# Patient Record
Sex: MALE | Race: BLACK OR AFRICAN AMERICAN | NOT HISPANIC OR LATINO | Employment: UNEMPLOYED | URBAN - METROPOLITAN AREA
[De-identification: names, ages, dates, MRNs, and addresses within clinical notes are randomized per-mention and may not be internally consistent; named-entity substitution may affect disease eponyms.]

---

## 2020-10-14 ENCOUNTER — HISTORICAL (OUTPATIENT)
Dept: ADMINISTRATIVE | Facility: HOSPITAL | Age: 35
End: 2020-10-14

## 2020-10-25 ENCOUNTER — HOSPITAL ENCOUNTER (INPATIENT)
Facility: HOSPITAL | Age: 35
LOS: 5 days | Discharge: HOME | DRG: 291 | End: 2020-10-30
Attending: EMERGENCY MEDICINE | Admitting: HOSPITALIST
Payer: MEDICAID

## 2020-10-25 ENCOUNTER — HOSPITAL ENCOUNTER (OUTPATIENT)
Dept: MEDSURG UNIT | Facility: HOSPITAL | Age: 35
End: 2020-10-25
Attending: INTERNAL MEDICINE | Admitting: INTERNAL MEDICINE

## 2020-10-25 DIAGNOSIS — F41.1 GENERALIZED ANXIETY DISORDER: ICD-10-CM

## 2020-10-25 DIAGNOSIS — D63.8 ANEMIA OF CHRONIC DISEASE: ICD-10-CM

## 2020-10-25 DIAGNOSIS — F12.20 CANNABIS USE DISORDER, SEVERE, DEPENDENCE: Chronic | ICD-10-CM

## 2020-10-25 DIAGNOSIS — Z01.818 ENCOUNTER FOR PRE-TRANSPLANT EVALUATION FOR HEART TRANSPLANT: ICD-10-CM

## 2020-10-25 DIAGNOSIS — R07.9 CHEST PAIN: ICD-10-CM

## 2020-10-25 DIAGNOSIS — R74.01 TRANSAMINITIS: ICD-10-CM

## 2020-10-25 DIAGNOSIS — F16.10 ECSTASY ABUSE: ICD-10-CM

## 2020-10-25 DIAGNOSIS — R06.02 SHORTNESS OF BREATH: ICD-10-CM

## 2020-10-25 DIAGNOSIS — I42.9 CARDIOMYOPATHY OF UNDETERMINED TYPE: ICD-10-CM

## 2020-10-25 DIAGNOSIS — N17.9 AKI (ACUTE KIDNEY INJURY): ICD-10-CM

## 2020-10-25 DIAGNOSIS — I42.9 CARDIOMYOPATHY: ICD-10-CM

## 2020-10-25 DIAGNOSIS — Z95.810 USES LIFEVEST DEFIBRILLATOR: ICD-10-CM

## 2020-10-25 DIAGNOSIS — I50.23 ACUTE ON CHRONIC SYSTOLIC CONGESTIVE HEART FAILURE: ICD-10-CM

## 2020-10-25 DIAGNOSIS — I50.9 ACUTE CONGESTIVE HEART FAILURE, UNSPECIFIED HEART FAILURE TYPE: Primary | ICD-10-CM

## 2020-10-25 PROBLEM — I50.21 ACUTE SYSTOLIC CONGESTIVE HEART FAILURE: Status: ACTIVE | Noted: 2020-10-25

## 2020-10-25 PROBLEM — F41.9 ANXIETY: Status: ACTIVE | Noted: 2020-10-25

## 2020-10-25 LAB
ABS NEUT (OLG): 6.85 X10(3)/MCL (ref 2.1–9.2)
ALBUMIN SERPL BCP-MCNC: 3.3 G/DL (ref 3.5–5.2)
ALBUMIN SERPL-MCNC: 3.4 GM/DL (ref 3.5–5)
ALBUMIN/GLOB SERPL: 1 RATIO (ref 1.1–2)
ALP SERPL-CCNC: 53 U/L (ref 55–135)
ALP SERPL-CCNC: 55 UNIT/L (ref 40–150)
ALT SERPL W/O P-5'-P-CCNC: 67 U/L (ref 10–44)
ALT SERPL-CCNC: 64 UNIT/L (ref 0–55)
AMPHET+METHAMPHET UR QL: NEGATIVE
ANION GAP SERPL CALC-SCNC: 12 MMOL/L (ref 8–16)
APTT PPP: 25.5 SECOND(S) (ref 23.2–33.7)
AST SERPL-CCNC: 52 U/L (ref 10–40)
AST SERPL-CCNC: 56 UNIT/L (ref 5–34)
B-OH-BUTYR BLD STRIP-SCNC: 0 MMOL/L (ref 0–0.5)
BARBITURATES UR QL SCN>200 NG/ML: NEGATIVE
BASOPHILS # BLD AUTO: 0 X10(3)/MCL (ref 0–0.2)
BASOPHILS # BLD AUTO: 0.03 K/UL (ref 0–0.2)
BASOPHILS NFR BLD AUTO: 0 %
BASOPHILS NFR BLD: 0.3 % (ref 0–1.9)
BENZODIAZ UR QL SCN>200 NG/ML: NORMAL
BILIRUB SERPL-MCNC: 0.4 MG/DL
BILIRUB SERPL-MCNC: 0.7 MG/DL (ref 0.1–1)
BILIRUBIN DIRECT+TOT PNL SERPL-MCNC: 0.2 MG/DL (ref 0–0.5)
BILIRUBIN DIRECT+TOT PNL SERPL-MCNC: 0.2 MG/DL (ref 0–0.8)
BNP BLD-MCNC: 2474 PG/ML (ref 0–100)
BNP SERPL-MCNC: 1278 PG/ML (ref 0–99)
BUN SERPL-MCNC: 20.8 MG/DL (ref 8.9–20.6)
BUN SERPL-MCNC: 23 MG/DL (ref 6–20)
BZE UR QL SCN: NEGATIVE
CALCIUM SERPL-MCNC: 9 MG/DL (ref 8.4–10.2)
CALCIUM SERPL-MCNC: 9.5 MG/DL (ref 8.7–10.5)
CANNABINOIDS UR QL SCN: NORMAL
CHLORIDE SERPL-SCNC: 108 MMOL/L (ref 95–110)
CHLORIDE SERPL-SCNC: 109 MMOL/L (ref 98–107)
CO2 SERPL-SCNC: 18 MMOL/L (ref 23–29)
CO2 SERPL-SCNC: 21 MMOL/L (ref 22–29)
CREAT SERPL-MCNC: 1.05 MG/DL (ref 0.73–1.18)
CREAT SERPL-MCNC: 1.3 MG/DL (ref 0.5–1.4)
CREAT UR-MCNC: 72 MG/DL (ref 23–375)
CTP QC/QA: YES
DIFFERENTIAL METHOD: ABNORMAL
EOSINOPHIL # BLD AUTO: 0.1 K/UL (ref 0–0.5)
EOSINOPHIL # BLD AUTO: 0.1 X10(3)/MCL (ref 0–0.9)
EOSINOPHIL NFR BLD AUTO: 1 %
EOSINOPHIL NFR BLD: 0.7 % (ref 0–8)
ERYTHROCYTE [DISTWIDTH] IN BLOOD BY AUTOMATED COUNT: 17.3 % (ref 11.5–14.5)
ERYTHROCYTE [DISTWIDTH] IN BLOOD BY AUTOMATED COUNT: 17.4 % (ref 11.5–17)
EST. GFR  (AFRICAN AMERICAN): >60 ML/MIN/1.73 M^2
EST. GFR  (NON AFRICAN AMERICAN): >60 ML/MIN/1.73 M^2
ETHANOL UR-MCNC: <10 MG/DL
GLOBULIN SER-MCNC: 3.3 GM/DL (ref 2.4–3.5)
GLUCOSE SERPL-MCNC: 114 MG/DL (ref 74–100)
GLUCOSE SERPL-MCNC: 118 MG/DL (ref 70–110)
H PYLORI AB SER IA-ACNC: NEGATIVE
HCT VFR BLD AUTO: 34.2 % (ref 42–52)
HCT VFR BLD AUTO: 39 % (ref 40–54)
HGB BLD-MCNC: 10.9 GM/DL (ref 14–18)
HGB BLD-MCNC: 12.3 G/DL (ref 14–18)
IMM GRANULOCYTES # BLD AUTO: 0.02 K/UL (ref 0–0.04)
IMM GRANULOCYTES NFR BLD AUTO: 0.2 % (ref 0–0.5)
INR PPP: 0.9 (ref 0–1.3)
LACTATE SERPL-SCNC: 2 MMOL/L (ref 0.5–2.2)
LIPASE SERPL-CCNC: 24 U/L
LIPASE SERPL-CCNC: 8 U/L (ref 4–60)
LYMPHOCYTES # BLD AUTO: 3.1 K/UL (ref 1–4.8)
LYMPHOCYTES # BLD AUTO: 3.4 X10(3)/MCL (ref 0.6–4.6)
LYMPHOCYTES NFR BLD AUTO: 30 %
LYMPHOCYTES NFR BLD: 28.2 % (ref 18–48)
MAGNESIUM SERPL-MCNC: 1.9 MG/DL (ref 1.6–2.6)
MCH RBC QN AUTO: 28.4 PG (ref 27–31)
MCH RBC QN AUTO: 28.5 PG (ref 27–31)
MCHC RBC AUTO-ENTMCNC: 31.5 G/DL (ref 32–36)
MCHC RBC AUTO-ENTMCNC: 31.9 GM/DL (ref 33–36)
MCV RBC AUTO: 89.1 FL (ref 80–94)
MCV RBC AUTO: 91 FL (ref 82–98)
METHADONE UR QL SCN>300 NG/ML: NEGATIVE
MONOCYTES # BLD AUTO: 0.7 K/UL (ref 0.3–1)
MONOCYTES # BLD AUTO: 0.7 X10(3)/MCL (ref 0.1–1.3)
MONOCYTES NFR BLD AUTO: 7 %
MONOCYTES NFR BLD: 6.6 % (ref 4–15)
NEUTROPHILS # BLD AUTO: 6.85 X10(3)/MCL (ref 2.1–9.2)
NEUTROPHILS # BLD AUTO: 7 K/UL (ref 1.8–7.7)
NEUTROPHILS NFR BLD AUTO: 61 %
NEUTROPHILS NFR BLD: 64 % (ref 38–73)
NRBC BLD-RTO: 0 /100 WBC
OPIATES UR QL SCN: NEGATIVE
PCP UR QL SCN>25 NG/ML: NEGATIVE
PLATELET # BLD AUTO: 274 K/UL (ref 150–350)
PLATELET # BLD AUTO: 289 X10(3)/MCL (ref 130–400)
PLATELET BLD QL SMEAR: ABNORMAL
PMV BLD AUTO: 10.5 FL (ref 9.2–12.9)
PMV BLD AUTO: 9.9 FL (ref 9.4–12.4)
POTASSIUM SERPL-SCNC: 4.2 MMOL/L (ref 3.5–5.1)
POTASSIUM SERPL-SCNC: 4.9 MMOL/L (ref 3.5–5.1)
PROT SERPL-MCNC: 6.7 GM/DL (ref 6.4–8.3)
PROT SERPL-MCNC: 6.9 G/DL (ref 6–8.4)
PROTHROMBIN TIME: 11.9 SECOND(S) (ref 11.1–13.7)
RBC # BLD AUTO: 3.84 X10(6)/MCL (ref 4.7–6.1)
RBC # BLD AUTO: 4.31 M/UL (ref 4.6–6.2)
SARS-COV-2 RDRP RESP QL NAA+PROBE: NEGATIVE
SODIUM SERPL-SCNC: 138 MMOL/L (ref 136–145)
SODIUM SERPL-SCNC: 141 MMOL/L (ref 136–145)
TOXICOLOGY INFORMATION: NORMAL
TROPONIN I SERPL DL<=0.01 NG/ML-MCNC: 0.13 NG/ML (ref 0–0.03)
TROPONIN I SERPL-MCNC: 0.15 NG/ML (ref 0–0.04)
TROPONIN I SERPL-MCNC: 0.16 NG/ML (ref 0–0.04)
WBC # BLD AUTO: 10.91 K/UL (ref 3.9–12.7)
WBC # SPEC AUTO: 11.1 X10(3)/MCL (ref 4.5–11.5)
WBC TOXIC VACUOLES BLD QL SMEAR: PRESENT

## 2020-10-25 PROCEDURE — 99285 PR EMERGENCY DEPT VISIT,LEVEL V: ICD-10-PCS | Mod: ,,, | Performed by: EMERGENCY MEDICINE

## 2020-10-25 PROCEDURE — U0002 COVID-19 LAB TEST NON-CDC: HCPCS | Performed by: EMERGENCY MEDICINE

## 2020-10-25 PROCEDURE — 93005 ELECTROCARDIOGRAM TRACING: CPT

## 2020-10-25 PROCEDURE — 96375 TX/PRO/DX INJ NEW DRUG ADDON: CPT

## 2020-10-25 PROCEDURE — 83605 ASSAY OF LACTIC ACID: CPT

## 2020-10-25 PROCEDURE — 93010 EKG 12-LEAD: ICD-10-PCS | Mod: ,,, | Performed by: INTERNAL MEDICINE

## 2020-10-25 PROCEDURE — 83735 ASSAY OF MAGNESIUM: CPT

## 2020-10-25 PROCEDURE — 83690 ASSAY OF LIPASE: CPT

## 2020-10-25 PROCEDURE — 99285 EMERGENCY DEPT VISIT HI MDM: CPT | Mod: ,,, | Performed by: EMERGENCY MEDICINE

## 2020-10-25 PROCEDURE — 83880 ASSAY OF NATRIURETIC PEPTIDE: CPT

## 2020-10-25 PROCEDURE — 84484 ASSAY OF TROPONIN QUANT: CPT

## 2020-10-25 PROCEDURE — 82010 KETONE BODYS QUAN: CPT

## 2020-10-25 PROCEDURE — 80307 DRUG TEST PRSMV CHEM ANLYZR: CPT

## 2020-10-25 PROCEDURE — 11000001 HC ACUTE MED/SURG PRIVATE ROOM

## 2020-10-25 PROCEDURE — 85025 COMPLETE CBC W/AUTO DIFF WBC: CPT

## 2020-10-25 PROCEDURE — 96374 THER/PROPH/DIAG INJ IV PUSH: CPT

## 2020-10-25 PROCEDURE — 63600175 PHARM REV CODE 636 W HCPCS: Performed by: EMERGENCY MEDICINE

## 2020-10-25 PROCEDURE — 99285 EMERGENCY DEPT VISIT HI MDM: CPT | Mod: 25

## 2020-10-25 PROCEDURE — 93010 ELECTROCARDIOGRAM REPORT: CPT | Mod: ,,, | Performed by: INTERNAL MEDICINE

## 2020-10-25 PROCEDURE — 80053 COMPREHEN METABOLIC PANEL: CPT

## 2020-10-25 RX ORDER — FUROSEMIDE 10 MG/ML
60 INJECTION INTRAMUSCULAR; INTRAVENOUS
Status: COMPLETED | OUTPATIENT
Start: 2020-10-25 | End: 2020-10-25

## 2020-10-25 RX ORDER — ONDANSETRON 2 MG/ML
4 INJECTION INTRAMUSCULAR; INTRAVENOUS
Status: COMPLETED | OUTPATIENT
Start: 2020-10-25 | End: 2020-10-25

## 2020-10-25 RX ORDER — ONDANSETRON 8 MG/1
8 TABLET, ORALLY DISINTEGRATING ORAL EVERY 8 HOURS PRN
Status: DISCONTINUED | OUTPATIENT
Start: 2020-10-25 | End: 2020-10-30 | Stop reason: HOSPADM

## 2020-10-25 RX ORDER — ENOXAPARIN SODIUM 100 MG/ML
40 INJECTION SUBCUTANEOUS EVERY 24 HOURS
Status: DISCONTINUED | OUTPATIENT
Start: 2020-10-25 | End: 2020-10-30 | Stop reason: HOSPADM

## 2020-10-25 RX ORDER — ENOXAPARIN SODIUM 100 MG/ML
30 INJECTION SUBCUTANEOUS EVERY 24 HOURS
Status: DISCONTINUED | OUTPATIENT
Start: 2020-10-25 | End: 2020-10-25

## 2020-10-25 RX ORDER — ALPRAZOLAM 0.25 MG/1
0.25 TABLET ORAL NIGHTLY
Status: ON HOLD | COMMUNITY
End: 2020-10-30 | Stop reason: HOSPADM

## 2020-10-25 RX ORDER — POLYETHYLENE GLYCOL 3350 17 G/17G
17 POWDER, FOR SOLUTION ORAL 2 TIMES DAILY PRN
Status: DISCONTINUED | OUTPATIENT
Start: 2020-10-25 | End: 2020-10-30 | Stop reason: HOSPADM

## 2020-10-25 RX ORDER — CARVEDILOL 3.12 MG/1
3.12 TABLET ORAL 2 TIMES DAILY
Status: ON HOLD | COMMUNITY
End: 2020-10-30 | Stop reason: HOSPADM

## 2020-10-25 RX ORDER — PROCHLORPERAZINE EDISYLATE 5 MG/ML
5 INJECTION INTRAMUSCULAR; INTRAVENOUS EVERY 6 HOURS PRN
Status: DISCONTINUED | OUTPATIENT
Start: 2020-10-25 | End: 2020-10-30 | Stop reason: HOSPADM

## 2020-10-25 RX ORDER — ASPIRIN 325 MG
325 TABLET, DELAYED RELEASE (ENTERIC COATED) ORAL DAILY
Status: ON HOLD | COMMUNITY
End: 2020-10-30 | Stop reason: HOSPADM

## 2020-10-25 RX ORDER — SODIUM CHLORIDE 0.9 % (FLUSH) 0.9 %
10 SYRINGE (ML) INJECTION
Status: DISCONTINUED | OUTPATIENT
Start: 2020-10-25 | End: 2020-10-30 | Stop reason: HOSPADM

## 2020-10-25 RX ORDER — ACETAMINOPHEN 325 MG/1
650 TABLET ORAL EVERY 4 HOURS PRN
Status: DISCONTINUED | OUTPATIENT
Start: 2020-10-25 | End: 2020-10-30 | Stop reason: HOSPADM

## 2020-10-25 RX ORDER — FUROSEMIDE 20 MG/1
20 TABLET ORAL DAILY
Status: ON HOLD | COMMUNITY
End: 2020-10-30 | Stop reason: SDUPTHER

## 2020-10-25 RX ORDER — ALPRAZOLAM 0.25 MG/1
0.25 TABLET ORAL 2 TIMES DAILY PRN
Status: DISCONTINUED | OUTPATIENT
Start: 2020-10-25 | End: 2020-10-26

## 2020-10-25 RX ORDER — FUROSEMIDE 10 MG/ML
80 INJECTION INTRAMUSCULAR; INTRAVENOUS
Status: DISCONTINUED | OUTPATIENT
Start: 2020-10-25 | End: 2020-10-26

## 2020-10-25 RX ORDER — METOPROLOL TARTRATE 25 MG/1
25 TABLET, FILM COATED ORAL 2 TIMES DAILY
Status: DISCONTINUED | OUTPATIENT
Start: 2020-10-25 | End: 2020-10-26

## 2020-10-25 RX ADMIN — FUROSEMIDE 60 MG: 10 INJECTION, SOLUTION INTRAMUSCULAR; INTRAVENOUS at 08:10

## 2020-10-25 RX ADMIN — ONDANSETRON 4 MG: 2 INJECTION INTRAMUSCULAR; INTRAVENOUS at 07:10

## 2020-10-25 NOTE — PROVIDER PROGRESS NOTES - EMERGENCY DEPT.
Encounter Date: 10/25/2020    ED Physician Progress Notes         EKG - STEMI Decision  Initial Reading: No STEMI present (Bring back, no previous ).  Response: patient moved to monitored bed.

## 2020-10-25 NOTE — ED PROVIDER NOTES
Encounter Date: 10/25/2020       History     Chief Complaint   Patient presents with    Chest Pain     has lifevest on , chest pain vomiting, aicd     35M with no known PMH presenting for 3 wk of SOB. Pt reports 3wk of fatigue,RAMIREZ, and chest pressure and orthopnea. He was recently seen at OSH where he was diagnosed with heart failure with EF10%, started on metoprolol and Lasix. He reports his LE edema improved, but continues to feel very symptomatic and SOB, much worse in past 2 days. Denies F/C,cough,congestion. 8 py (recently quit) smoker, no drug use aside fromMJ, no ETOh use. Father had heart txp by Dr. Crum.  Patient reports multiple episodes of vomiting today, accompanied by diffuse upper abdominal pain, and several days of muscle cramping.  Denies changes in stooling or urination.        Review of patient's allergies indicates:  No Known Allergies  Past Medical History:   Diagnosis Date    CHF (congestive heart failure) 10/01/2020     History reviewed. No pertinent surgical history.  Family History   Problem Relation Age of Onset    Heart failure Father     Diverticulosis Brother     Heart attack Maternal Grandmother     Heart attack Maternal Grandfather      Social History     Tobacco Use    Smoking status: Former Smoker     Packs/day: 0.50     Years: 16.00     Pack years: 8.00     Start date: 10/1/2004     Quit date: 10/1/2020     Years since quittin.0   Substance Use Topics    Alcohol use: Not Currently    Drug use: Not Currently     Types: Marijuana, MDMA (Ecstacy)     Review of Systems   Constitutional: Negative for chills and fever.   HENT: Negative for congestion.    Eyes: Negative for visual disturbance.        Neg vision changes   Respiratory: Positive for chest tightness and shortness of breath. Negative for cough.    Cardiovascular: Positive for leg swelling. Negative for chest pain.   Gastrointestinal: Positive for abdominal pain. Negative for nausea and vomiting.        Neg changes  in stool   Genitourinary: Negative for difficulty urinating, flank pain, penile swelling and scrotal swelling.        Neg changes in urination   Musculoskeletal: Negative for arthralgias and joint swelling.   Skin: Negative for rash.   Allergic/Immunologic: Negative for immunocompromised state.   Neurological: Negative for dizziness, syncope, weakness, light-headedness and numbness.   Hematological: Negative for adenopathy. Does not bruise/bleed easily.       Physical Exam     Initial Vitals [10/25/20 1820]   BP Pulse Resp Temp SpO2   (!) 141/74 106 20 97.9 °F (36.6 °C) 100 %      MAP       --         Physical Exam    Nursing note and vitals reviewed.  Constitutional: He appears well-developed and well-nourished. He is not diaphoretic. No distress.   Appears fatigued   HENT:   Head: Normocephalic and atraumatic.   Nose: Nose normal.   Eyes: Conjunctivae and EOM are normal. Pupils are equal, round, and reactive to light. No scleral icterus.   Neck: Normal range of motion. Neck supple. JVD present.   Cardiovascular: Normal rate, regular rhythm and intact distal pulses.   Pulmonary/Chest: No respiratory distress. He has no wheezes. He has no rhonchi. He has rales (Diffuse). He exhibits no tenderness.   Abdominal: Soft. Bowel sounds are normal. He exhibits no distension. There is abdominal tenderness (Diffuse moderate, worse in upper quadrants). There is no rebound and no guarding.   Musculoskeletal: Normal range of motion. No tenderness or edema.   Neurological: He is alert and oriented to person, place, and time. He has normal strength. No cranial nerve deficit or sensory deficit.   Skin: Skin is warm and dry. Capillary refill takes less than 2 seconds. No rash noted. No pallor.   Mildly tender over right groin cath insertion but no evidence of infection   Psychiatric: He has a normal mood and affect. His behavior is normal. Judgment and thought content normal.         ED Course   Procedures  Labs Reviewed   CBC W/  AUTO DIFFERENTIAL - Abnormal; Notable for the following components:       Result Value    RBC 4.31 (*)     Hemoglobin 12.3 (*)     Hematocrit 39.0 (*)     MCHC 31.5 (*)     RDW 17.3 (*)     All other components within normal limits   COMPREHENSIVE METABOLIC PANEL - Abnormal; Notable for the following components:    CO2 18 (*)     Glucose 118 (*)     BUN 23 (*)     Albumin 3.3 (*)     Alkaline Phosphatase 53 (*)     AST 52 (*)     ALT 67 (*)     All other components within normal limits   TROPONIN I - Abnormal; Notable for the following components:    Troponin I 0.132 (*)     All other components within normal limits   B-TYPE NATRIURETIC PEPTIDE - Abnormal; Notable for the following components:    BNP 1,278 (*)     All other components within normal limits   MAGNESIUM   BETA - HYDROXYBUTYRATE, SERUM   LIPASE   LACTIC ACID, PLASMA   SARS-COV-2 RDRP GENE        ECG Results          EKG 12-lead (Final result)  Result time 10/26/20 08:13:37    Final result by Interface, Lab In [facility] (10/26/20 08:13:37)                 Narrative:    Test Reason : R07.9,    Vent. Rate : 105 BPM     Atrial Rate : 105 BPM     P-R Int : 130 ms          QRS Dur : 096 ms      QT Int : 376 ms       P-R-T Axes : 071 -39 -07 degrees     QTc Int : 496 ms    Sinus tachycardia withshort pr interval  Biatrial enlargement  Left axis deviation  Nonspecific T wave abnormality  Abnormal ECG  No previous ECGs available  Confirmed by LILLIE PAINTER MD ([id number]) on 10/26/2020 8:13:28 AM    Referred By: [email or web address]   SELF           Confirmed By:LILLIE PAINTER MD                            Imaging Results          X-Ray Chest AP Portable (Final result)  Result time 10/25/20 20:33:43    Final result by Ricardo Alvarez MD (10/25/20 20:33:43)                 Impression:      No acute findings in the chest.    Cardiomegaly.      Electronically signed by: Ricardo Alvarez MD  Date:    10/25/2020  Time:    20:33             Narrative:    EXAMINATION:  XR CHEST AP  "PORTABLE    CLINICAL HISTORY:  Shortness of breath    TECHNIQUE:  Single frontal view of the chest was performed.    COMPARISON:  None    FINDINGS:  Telemetry wires overlie the chest.    No consolidation, pleural effusion or pneumothorax.    Cardiomediastinal silhouette is enlarged.                                 Medical Decision Making:   History:   I obtained history from: someone other than patient.       <> Summary of History: Mother at bedside states  who had heart txp had rheumatic fever causing MV damage, but she is concerned there may be genetic component to cardiomyopathy.   Old Medical Records: I decided to obtain old medical records.  Old Records Summarized: records from previous admission(s), records from another hospital and records from clinic visits.       <> Summary of Records: [facility] [place] [facility], where he eloped from ED: "36 y/o black male with no pertinent medical history. Presents to the ER with complaints of nonproductive cough, shortness of breath, and chest tightness that began 6 weeks ago. He states that he is gradually felt worse and was seen twice at [facility] for these complaints and diagnosed with bronchitis and then pneumonia 10 days ago. He states that he was negative for COVID-19 when tested at that time. He is a daily smoker. He denies any fever, chills, or body aches. No nausea vomiting. Patient is awake, alert, and oriented x4 and in no acute distress at this time.  CXR: Bilateral upper lobe predominant hazy pulmonary opacities (left greater than right), concerning for airspace disease."  Initial Assessment:   Patient does not appear to be in respiratory distress although appears very fatigued, diffuse rales bilaterally, abdomen soft and nondistended but diffusely tender to palpation and worse in the upper quadrants, no lower extremity edema  Differential Diagnosis:   AoCHF volume overload, CAD, myocarditis, cardiomyopathy, pancreatitis, " ischemia, less likely cardiogenic shock as skin well-perfused  Independently Interpreted Test(s):   I have ordered and independently interpreted X-rays - see summary below.       <> Summary of X-Ray Reading(s): Cardiomegaly, faint pulm edema but no effusions  I have ordered and independently interpreted EKG Reading(s) - see summary below       <> Summary of EKG Reading(s): Sinus tachycardic, no STEMI  Clinical Tests:   Lab Tests: Ordered and Reviewed  Radiological Study: Ordered and Reviewed  Medical Tests: Ordered and Reviewed  ED Management:  Patient desaturates to 85% while sleeping but quickly increases to 99% while awake.  Will place on nasal cannula.  Chest x-ray from outside facility shows pulmonary edema, BNP elevated to 1300, will give Lasix 60 mg IV.  With abdominal pain and lactate 2.0, patient may have insufficient perfusion 2/2 cardiogenic, no focal TTP concerning for surgical pathology.  Other:   I have discussed this case with another health care provider.       <> Summary of the Discussion: Discussed with Cardiology who will admit patient.                    ED Course as of Oct 27 1647   Sun Oct 25, 2020   1953 Troponin I(!): 0.132 [JR]   1954 BNP(!): 1,278 [JR]      ED Course User Index  [JR] Violetta Perry MD            Clinical Impression:       ICD-10-CM ICD-9-CM   1. Acute congestive heart failure, unspecified heart failure type  I50.9 428.0   2. Chest pain  R07.9 786.50   3. Shortness of breath  R06.02 786.05   4. Cardiomyopathy  I42.9 425.4   5. Anemia of chronic disease  D63.8 285.29   6. Acute on chronic systolic congestive heart failure  I50.23 428.23                    Disposition:   Disposition: Admitted  Condition: Serious     ED Disposition Condition    Admit                             Violetta Perry MD  10/27/20 [id number]

## 2020-10-26 PROBLEM — Z95.810 USES LIFEVEST DEFIBRILLATOR: Status: ACTIVE | Noted: 2020-10-26

## 2020-10-26 PROBLEM — N17.9 AKI (ACUTE KIDNEY INJURY): Status: ACTIVE | Noted: 2020-10-26

## 2020-10-26 PROBLEM — R74.01 TRANSAMINITIS: Status: ACTIVE | Noted: 2020-10-26

## 2020-10-26 PROBLEM — I42.9 CARDIOMYOPATHY OF UNDETERMINED TYPE: Status: ACTIVE | Noted: 2020-10-26

## 2020-10-26 PROBLEM — F41.1 GENERALIZED ANXIETY DISORDER: Status: ACTIVE | Noted: 2020-10-25

## 2020-10-26 PROBLEM — F16.10 ECSTASY ABUSE: Status: ACTIVE | Noted: 2020-10-26

## 2020-10-26 PROBLEM — D63.8 ANEMIA OF CHRONIC DISEASE: Status: ACTIVE | Noted: 2020-10-26

## 2020-10-26 PROBLEM — I50.23 ACUTE ON CHRONIC SYSTOLIC CONGESTIVE HEART FAILURE: Status: ACTIVE | Noted: 2020-10-25

## 2020-10-26 LAB
ALBUMIN SERPL BCP-MCNC: 3.1 G/DL (ref 3.5–5.2)
ALP SERPL-CCNC: 56 U/L (ref 55–135)
ALT SERPL W/O P-5'-P-CCNC: 63 U/L (ref 10–44)
ANION GAP SERPL CALC-SCNC: 12 MMOL/L (ref 8–16)
ASCENDING AORTA: 2.34 CM
AST SERPL-CCNC: 50 U/L (ref 10–40)
AV INDEX (PROSTH): 0.61
AV MEAN GRADIENT: 1 MMHG
AV PEAK GRADIENT: 2 MMHG
AV VALVE AREA: 1.85 CM2
AV VELOCITY RATIO: 0.55
BASOPHILS # BLD AUTO: 0.03 K/UL (ref 0–0.2)
BASOPHILS NFR BLD: 0.2 % (ref 0–1.9)
BILIRUB SERPL-MCNC: 0.9 MG/DL (ref 0.1–1)
BSA FOR ECHO PROCEDURE: 2.04 M2
BUN SERPL-MCNC: 27 MG/DL (ref 6–20)
CALCIUM SERPL-MCNC: 9.4 MG/DL (ref 8.7–10.5)
CHLORIDE SERPL-SCNC: 104 MMOL/L (ref 95–110)
CO2 SERPL-SCNC: 24 MMOL/L (ref 23–29)
CREAT SERPL-MCNC: 1.6 MG/DL (ref 0.5–1.4)
CV ECHO LV RWT: 0.25 CM
DIFFERENTIAL METHOD: ABNORMAL
DOP CALC AO PEAK VEL: 0.73 M/S
DOP CALC AO VTI: 9.12 CM
DOP CALC LVOT AREA: 3 CM2
DOP CALC LVOT DIAMETER: 1.96 CM
DOP CALC LVOT PEAK VEL: 0.4 M/S
DOP CALC LVOT STROKE VOLUME: 16.86 CM3
DOP CALCLVOT PEAK VEL VTI: 5.59 CM
E WAVE DECELERATION TIME: 103.79 MSEC
E/A RATIO: 1.67
E/E' RATIO: 9.67 M/S
ECHO LV POSTERIOR WALL: 0.91 CM (ref 0.6–1.1)
EOSINOPHIL # BLD AUTO: 0.1 K/UL (ref 0–0.5)
EOSINOPHIL NFR BLD: 0.7 % (ref 0–8)
ERYTHROCYTE [DISTWIDTH] IN BLOOD BY AUTOMATED COUNT: 17.6 % (ref 11.5–14.5)
EST. GFR  (AFRICAN AMERICAN): >60 ML/MIN/1.73 M^2
EST. GFR  (NON AFRICAN AMERICAN): 55 ML/MIN/1.73 M^2
FOLATE SERPL-MCNC: 9.4 NG/ML (ref 4–24)
FRACTIONAL SHORTENING: 5 % (ref 28–44)
GLUCOSE SERPL-MCNC: 111 MG/DL (ref 70–110)
HCT VFR BLD AUTO: 36.3 % (ref 40–54)
HGB BLD-MCNC: 11.3 G/DL (ref 14–18)
IMM GRANULOCYTES # BLD AUTO: 0.03 K/UL (ref 0–0.04)
IMM GRANULOCYTES NFR BLD AUTO: 0.2 % (ref 0–0.5)
INTERVENTRICULAR SEPTUM: 0.85 CM (ref 0.6–1.1)
IVRT: 106.57 MSEC
LA MAJOR: 6.03 CM
LA MINOR: 5.97 CM
LA WIDTH: 5.77 CM
LEFT ATRIUM SIZE: 3.87 CM
LEFT ATRIUM VOLUME INDEX: 55.2 ML/M2
LEFT ATRIUM VOLUME: 113.88 CM3
LEFT INTERNAL DIMENSION IN SYSTOLE: 6.81 CM (ref 2.1–4)
LEFT VENTRICLE DIASTOLIC VOLUME INDEX: 131.1 ML/M2
LEFT VENTRICLE DIASTOLIC VOLUME: 270.38 ML
LEFT VENTRICLE MASS INDEX: 139 G/M2
LEFT VENTRICLE SYSTOLIC VOLUME INDEX: 116.4 ML/M2
LEFT VENTRICLE SYSTOLIC VOLUME: 240.07 ML
LEFT VENTRICULAR INTERNAL DIMENSION IN DIASTOLE: 7.18 CM (ref 3.5–6)
LEFT VENTRICULAR MASS: 287.12 G
LV LATERAL E/E' RATIO: 5.8 M/S
LV SEPTAL E/E' RATIO: 29 M/S
LYMPHOCYTES # BLD AUTO: 5.4 K/UL (ref 1–4.8)
LYMPHOCYTES NFR BLD: 40 % (ref 18–48)
MAGNESIUM SERPL-MCNC: 1.8 MG/DL (ref 1.6–2.6)
MCH RBC QN AUTO: 28.3 PG (ref 27–31)
MCHC RBC AUTO-ENTMCNC: 31.1 G/DL (ref 32–36)
MCV RBC AUTO: 91 FL (ref 82–98)
MONOCYTES # BLD AUTO: 0.9 K/UL (ref 0.3–1)
MONOCYTES NFR BLD: 6.6 % (ref 4–15)
MV PEAK A VEL: 0.52 M/S
MV PEAK E VEL: 0.87 M/S
MV STENOSIS PRESSURE HALF TIME: 30.1 MS
MV VALVE AREA P 1/2 METHOD: 7.31 CM2
NEUTROPHILS # BLD AUTO: 7 K/UL (ref 1.8–7.7)
NEUTROPHILS NFR BLD: 52.3 % (ref 38–73)
NRBC BLD-RTO: 0 /100 WBC
PHOSPHATE SERPL-MCNC: 4.6 MG/DL (ref 2.7–4.5)
PISA TR MAX VEL: 3.02 M/S
PLATELET # BLD AUTO: 290 K/UL (ref 150–350)
PMV BLD AUTO: 10.5 FL (ref 9.2–12.9)
POTASSIUM SERPL-SCNC: 4.4 MMOL/L (ref 3.5–5.1)
PROT SERPL-MCNC: 6.6 G/DL (ref 6–8.4)
RA MAJOR: 4.86 CM
RA PRESSURE: 8 MMHG
RA WIDTH: 4.3 CM
RBC # BLD AUTO: 4 M/UL (ref 4.6–6.2)
RIGHT VENTRICULAR END-DIASTOLIC DIMENSION: 4.58 CM
RV TISSUE DOPPLER FREE WALL SYSTOLIC VELOCITY 1 (APICAL 4 CHAMBER VIEW): 5.2 CM/S
SINUS: 2.7 CM
SODIUM SERPL-SCNC: 140 MMOL/L (ref 136–145)
STJ: 2.22 CM
TDI LATERAL: 0.15 M/S
TDI SEPTAL: 0.03 M/S
TDI: 0.09 M/S
TR MAX PG: 36 MMHG
TV REST PULMONARY ARTERY PRESSURE: 44 MMHG
VIT B12 SERPL-MCNC: 630 PG/ML (ref 210–950)
WBC # BLD AUTO: 13.44 K/UL (ref 3.9–12.7)

## 2020-10-26 PROCEDURE — 99223 PR INITIAL HOSPITAL CARE,LEVL III: ICD-10-PCS | Mod: ,,, | Performed by: INTERNAL MEDICINE

## 2020-10-26 PROCEDURE — 84100 ASSAY OF PHOSPHORUS: CPT

## 2020-10-26 PROCEDURE — 80053 COMPREHEN METABOLIC PANEL: CPT

## 2020-10-26 PROCEDURE — 63600175 PHARM REV CODE 636 W HCPCS: Performed by: STUDENT IN AN ORGANIZED HEALTH CARE EDUCATION/TRAINING PROGRAM

## 2020-10-26 PROCEDURE — 84207 ASSAY OF VITAMIN B-6: CPT

## 2020-10-26 PROCEDURE — 63600175 PHARM REV CODE 636 W HCPCS: Performed by: INTERNAL MEDICINE

## 2020-10-26 PROCEDURE — 99223 1ST HOSP IP/OBS HIGH 75: CPT | Mod: ,,, | Performed by: INTERNAL MEDICINE

## 2020-10-26 PROCEDURE — 25000003 PHARM REV CODE 250: Performed by: INTERNAL MEDICINE

## 2020-10-26 PROCEDURE — 11000001 HC ACUTE MED/SURG PRIVATE ROOM

## 2020-10-26 PROCEDURE — 83735 ASSAY OF MAGNESIUM: CPT

## 2020-10-26 PROCEDURE — 36415 COLL VENOUS BLD VENIPUNCTURE: CPT

## 2020-10-26 PROCEDURE — 82607 VITAMIN B-12: CPT

## 2020-10-26 PROCEDURE — 85025 COMPLETE CBC W/AUTO DIFF WBC: CPT

## 2020-10-26 PROCEDURE — 82746 ASSAY OF FOLIC ACID SERUM: CPT

## 2020-10-26 PROCEDURE — 25000003 PHARM REV CODE 250: Performed by: STUDENT IN AN ORGANIZED HEALTH CARE EDUCATION/TRAINING PROGRAM

## 2020-10-26 RX ORDER — SPIRONOLACTONE 25 MG/1
25 TABLET ORAL DAILY
Status: DISCONTINUED | OUTPATIENT
Start: 2020-10-26 | End: 2020-10-29

## 2020-10-26 RX ORDER — HYDROXYZINE HYDROCHLORIDE 25 MG/1
50 TABLET, FILM COATED ORAL 3 TIMES DAILY PRN
Status: DISCONTINUED | OUTPATIENT
Start: 2020-10-26 | End: 2020-10-30 | Stop reason: HOSPADM

## 2020-10-26 RX ORDER — FUROSEMIDE 10 MG/ML
40 INJECTION INTRAMUSCULAR; INTRAVENOUS
Status: DISCONTINUED | OUTPATIENT
Start: 2020-10-27 | End: 2020-10-26

## 2020-10-26 RX ORDER — FUROSEMIDE 10 MG/ML
80 INJECTION INTRAMUSCULAR; INTRAVENOUS ONCE
Status: COMPLETED | OUTPATIENT
Start: 2020-10-26 | End: 2020-10-26

## 2020-10-26 RX ORDER — FUROSEMIDE 10 MG/ML
80 INJECTION INTRAMUSCULAR; INTRAVENOUS
Status: DISCONTINUED | OUTPATIENT
Start: 2020-10-27 | End: 2020-10-26

## 2020-10-26 RX ORDER — ALPRAZOLAM 0.25 MG/1
0.25 TABLET ORAL NIGHTLY PRN
Status: DISCONTINUED | OUTPATIENT
Start: 2020-10-26 | End: 2020-10-30 | Stop reason: HOSPADM

## 2020-10-26 RX ADMIN — ENOXAPARIN SODIUM 40 MG: 40 INJECTION SUBCUTANEOUS at 06:10

## 2020-10-26 RX ADMIN — FUROSEMIDE 10 MG/HR: 10 INJECTION, SOLUTION INTRAMUSCULAR; INTRAVENOUS at 03:10

## 2020-10-26 RX ADMIN — METOPROLOL SUCCINATE 12.5 MG: 25 TABLET, EXTENDED RELEASE ORAL at 11:10

## 2020-10-26 RX ADMIN — FUROSEMIDE 80 MG: 10 INJECTION, SOLUTION INTRAMUSCULAR; INTRAVENOUS at 03:10

## 2020-10-26 RX ADMIN — HUMAN ALBUMIN MICROSPHERES AND PERFLUTREN 0.66 MG: 10; .22 INJECTION, SOLUTION INTRAVENOUS at 04:10

## 2020-10-26 RX ADMIN — ENOXAPARIN SODIUM 40 MG: 40 INJECTION SUBCUTANEOUS at 12:10

## 2020-10-26 RX ADMIN — HYDROXYZINE HYDROCHLORIDE 50 MG: 25 TABLET, FILM COATED ORAL at 03:10

## 2020-10-26 RX ADMIN — METOPROLOL TARTRATE 25 MG: 25 TABLET, FILM COATED ORAL at 01:10

## 2020-10-26 RX ADMIN — SPIRONOLACTONE 25 MG: 25 TABLET ORAL at 11:10

## 2020-10-26 RX ADMIN — ALPRAZOLAM 0.25 MG: 0.25 TABLET ORAL at 01:10

## 2020-10-26 NOTE — ASSESSMENT & PLAN NOTE
Nonischemic cardiomyopathy with EF of 10-15%.  Pending echocardiogram look at the LV wall size  He looks warm and wet  Left heart catheterization showed clean coronaries according to the patient.  Please request for left heart catheterization from [facility]  He is volume up.  His JVP is at 18 cm of water. Recommend diuresing with Lasix at 20 mg/hr after IV push Lasix 80 mg  Please check for ferritin, TSH.   GDMT- Recommend starting on hydralazine/Isordil 10 mg TID.  As creatinine starts to improve recommends adding lisinopril.   Currently He will not be candidate for any advance options because of his polysubstance abuse

## 2020-10-26 NOTE — PLAN OF CARE
"Per MD hold lasix d/t low BP. PRN xanax given and atarax. Safety maintained. Pt showered indep. New PIV placed. Pt stated his life vest was "dying" so he took it off. Remains on tele. Been NPO since MN. On 3 LNC. Admission completed.     BP (!) 91/59   Pulse 97   Temp 98 °F (36.7 °C)   Resp 20   Ht 6' 3" (1.905 m)   Wt 78.4 kg (172 lb 13.5 oz)   SpO2 (!) 91%   BMI 21.60 kg/m²     "

## 2020-10-26 NOTE — PLAN OF CARE
CM faxed request for medical records from [facility] to [phone], will cont to follow.    Rajani Crawford, MSN  Case Management  Ext [phone]

## 2020-10-26 NOTE — NURSING NOTE
optison given for sub optimal imaging by *jolly hammond [facility]**

## 2020-10-26 NOTE — SUBJECTIVE & OBJECTIVE
Past Medical History:   Diagnosis Date    CHF (congestive heart failure) 10/01/2020       History reviewed. No pertinent surgical history.    Review of patient's allergies indicates:  No Known Allergies    Current Facility-Administered Medications   Medication    acetaminophen tablet 650 mg    ALPRAZolam tablet 0.25 mg    enoxaparin injection 40 mg    [START ON 10/27/2020] furosemide injection 40 mg    hydrOXYzine HCL tablet 50 mg    metoprolol succinate (TOPROL-XL) 24 hr split tablet 12.5 mg    ondansetron disintegrating tablet 8 mg    polyethylene glycol packet 17 g    prochlorperazine injection Soln 5 mg    sodium chloride 0.9% flush 10 mL    spironolactone tablet 25 mg     Family History     Problem Relation (Age of Onset)    Diverticulosis Brother    Heart attack Maternal Grandmother, Maternal Grandfather    Heart failure Father        Tobacco Use    Smoking status: Former Smoker     Packs/day: 0.50     Years: 16.00     Pack years: 8.00     Start date: 10/1/2004     Quit date: 10/1/2020     Years since quittin.0   Substance and Sexual Activity    Alcohol use: Not Currently    Drug use: Not Currently     Types: Marijuana, MDMA (Ecstacy)    Sexual activity: Yes     Partners: Female     Birth control/protection: None     Review of Systems   Constitutional: Positive for fatigue.   HENT: Negative.    Eyes: Negative.    Respiratory: Positive for shortness of breath.    Cardiovascular: Negative.    Gastrointestinal: Positive for abdominal distention and abdominal pain.   Endocrine: Negative.    Genitourinary: Negative.    Musculoskeletal: Negative.    Neurological: Negative.    Hematological: Negative.      Objective:     Vital Signs (Most Recent):  Temp: 98.6 °F (37 °C) (10/26/20 0756)  Pulse: 102 (10/26/20 0756)  Resp: 18 (10/26/20 0756)  BP: 97/68 (10/26/20 0756)  SpO2: 98 % (10/26/20 0756) Vital Signs (24h Range):  Temp:  [97.9 °F (36.6 °C)-98.6 °F (37 °C)] 98.6 °F (37 °C)  Pulse:  []  102  Resp:  [18-22] 18  SpO2:  [90 %-100 %] 98 %  BP: ()/(59-75) 97/68     Patient Vitals for the past 72 hrs (Last 3 readings):   Weight   10/26/20 0400 78.4 kg (172 lb 13.5 oz)   10/25/20 2101 78.4 kg (172 lb 13.5 oz)   10/25/20 1820 73 kg (161 lb)     Body mass index is 21.6 kg/m².      Intake/Output Summary (Last 24 hours) at 10/26/2020 0918  Last data filed at 10/26/2020 0000  Gross per 24 hour   Intake 240 ml   Output 1000 ml   Net -760 ml       Physical Exam  Constitutional:       Appearance: Normal appearance.   HENT:      Head: Normocephalic and atraumatic.      Nose: Nose normal.      Mouth/Throat:      Mouth: Mucous membranes are moist.   Eyes:      Extraocular Movements: Extraocular movements intact.      Pupils: Pupils are equal, round, and reactive to light.   Neck:      Musculoskeletal: Normal range of motion and neck supple.      Comments: JVP at 18 cm water  Cardiovascular:      Rate and Rhythm: Regular rhythm. Tachycardia present.      Heart sounds: Gallop present.    Pulmonary:      Effort: Pulmonary effort is normal.      Breath sounds: Wheezing present.   Abdominal:      General: Abdomen is flat. Bowel sounds are normal. There is distension.      Palpations: Abdomen is soft.      Tenderness: There is abdominal tenderness.      Comments: Tender hepatomegaly present   Liver span 16 cm   Musculoskeletal: Normal range of motion.   Skin:     General: Skin is warm.      Capillary Refill: Capillary refill takes less than 2 seconds.   Neurological:      General: No focal deficit present.      Mental Status: He is alert and oriented to person, place, and time.         Significant Labs:  CBC:  Recent Labs   Lab 10/25/20  1900 10/26/20  0426   WBC 10.91 13.44*   RBC 4.31* 4.00*   HGB 12.3* 11.3*   HCT 39.0* 36.3*    290   MCV 91 91   MCH 28.5 28.3   MCHC 31.5* 31.1*     BNP:  Recent Labs   Lab 10/25/20  1900   BNP 1,278*     CMP:  Recent Labs   Lab 10/25/20  1900 10/26/20  0426   * 111*    CALCIUM 9.5 9.4   ALBUMIN 3.3* 3.1*   PROT 6.9 6.6    140   K 4.9 4.4   CO2 18* 24    104   BUN 23* 27*   CREATININE 1.3 1.6*   ALKPHOS 53* 56   ALT 67* 63*   AST 52* 50*   BILITOT 0.7 0.9      Coagulation:   No results for input(s): PT, INR, APTT in the last 168 hours.  LDH:  No results for input(s): LDH in the last 72 hours.  Microbiology:  Microbiology Results (last 7 days)     ** No results found for the last 168 hours. **          I have reviewed all pertinent labs within the past 24 hours.    Diagnostic Results:  I have reviewed and interpreted all pertinent imaging results/findings within the past 24 hours.

## 2020-10-26 NOTE — SUBJECTIVE & OBJECTIVE
Past Medical History:   Diagnosis Date    CHF (congestive heart failure) 10/01/2020       History reviewed. No pertinent surgical history.    Review of patient's allergies indicates:  No Known Allergies    No current facility-administered medications on file prior to encounter.      Current Outpatient Medications on File Prior to Encounter   Medication Sig    ALPRAZolam (XANAX) 0.25 MG tablet Take by mouth 2 (two) times daily as needed for Anxiety.    aspirin (ECOTRIN) 325 MG EC tablet Take 325 mg by mouth once daily.    carvediloL (COREG) 3.125 MG tablet Take 3.125 mg by mouth 2 (two) times daily.    furosemide (LASIX) 20 MG tablet Take 20 mg by mouth once daily.     Family History     Problem Relation (Age of Onset)    Diverticulosis Brother    Heart attack Maternal Grandmother, Maternal Grandfather    Heart failure Father        Tobacco Use    Smoking status: Former Smoker     Packs/day: 0.50     Years: 16.00     Pack years: 8.00     Start date: 10/1/2004     Quit date: 10/1/2020     Years since quittin.0   Substance and Sexual Activity    Alcohol use: Not Currently    Drug use: Not Currently     Types: Marijuana, MDMA (Ecstacy)    Sexual activity: Yes     Partners: Female     Birth control/protection: None     Review of Systems   Constitutional: Negative for activity change, chills, fatigue and fever.   HENT: Negative for congestion, rhinorrhea, sore throat and trouble swallowing.    Eyes: Negative for photophobia and visual disturbance.   Respiratory: Positive for shortness of breath. Negative for cough, chest tightness and wheezing.    Cardiovascular: Positive for palpitations. Negative for chest pain and leg swelling.   Gastrointestinal: Positive for abdominal distention, nausea and vomiting. Negative for diarrhea.   Genitourinary: Negative for difficulty urinating, dysuria, frequency and hematuria.   Musculoskeletal: Negative for arthralgias and myalgias.   Skin: Negative for rash and wound.    Neurological: Negative for dizziness, syncope, weakness and light-headedness.   Psychiatric/Behavioral: Negative for agitation, behavioral problems and confusion. The patient is nervous/anxious.      Objective:     Vital Signs (Most Recent):  Temp: 97.9 °F (36.6 °C) (10/25/20 1820)  Pulse: 109 (10/25/20 2101)  Resp: (!) 22 (10/25/20 1930)  BP: 96/66 (10/25/20 2101)  SpO2: 100 % (10/25/20 2101) Vital Signs (24h Range):  Temp:  [97.9 °F (36.6 °C)] 97.9 °F (36.6 °C)  Pulse:  [106-109] 109  Resp:  [20-22] 22  SpO2:  [90 %-100 %] 100 %  BP: ()/(66-75) 96/66     Weight: 73 kg (161 lb)  Body mass index is 20.12 kg/m².    Physical Exam  Vitals signs reviewed.   Constitutional:       General: He is not in acute distress.     Appearance: Normal appearance. He is normal weight.   HENT:      Head: Normocephalic and atraumatic.      Nose: Nose normal.      Mouth/Throat:      Mouth: Mucous membranes are dry.      Pharynx: Oropharynx is clear.   Eyes:      General: No scleral icterus.     Extraocular Movements: Extraocular movements intact.      Conjunctiva/sclera: Conjunctivae normal.   Neck:      Musculoskeletal: Normal range of motion and neck supple.      Vascular: JVD present.   Cardiovascular:      Rate and Rhythm: Regular rhythm. Tachycardia present.      Pulses: Normal pulses.      Heart sounds: S1 normal and S2 normal. No gallop.    Pulmonary:      Effort: Pulmonary effort is normal.      Breath sounds: Rales present.   Abdominal:      General: Bowel sounds are normal. There is distension.      Palpations: Abdomen is soft.      Tenderness: There is no abdominal tenderness.   Musculoskeletal: Normal range of motion.      Right lower leg: No edema.      Left lower leg: No edema.   Skin:     General: Skin is warm and dry.   Neurological:      General: No focal deficit present.      Mental Status: He is alert and oriented to person, place, and time. Mental status is at baseline.   Psychiatric:         Mood and Affect:  Mood normal.         Behavior: Behavior normal.         Thought Content: Thought content normal.         Judgment: Judgment normal.             Significant Labs:   CBC:   Recent Labs   Lab 10/25/20  1900   WBC 10.91   HGB 12.3*   HCT 39.0*        CMP:   Recent Labs   Lab 10/25/20  1900      K 4.9      CO2 18*   *   BUN 23*   CREATININE 1.3   CALCIUM 9.5   PROT 6.9   ALBUMIN 3.3*   BILITOT 0.7   ALKPHOS 53*   AST 52*   ALT 67*   ANIONGAP 12   EGFRNONAA >60.0     Cardiac Markers:   Recent Labs   Lab 10/25/20  1900   BNP 1,278*     Troponin:   Recent Labs   Lab 10/25/20  1900   TROPONINI 0.132*     All pertinent labs within the past 24 hours have been reviewed.    Significant Imaging:   X-Ray Chest AP Portable   Final Result      No acute findings in the chest.      Cardiomegaly.         Electronically signed by: Ricardo Alvarez MD   Date:    10/25/2020   Time:    20:33        I have reviewed all pertinent imaging results/findings within the past 24 hours.

## 2020-10-26 NOTE — ASSESSMENT & PLAN NOTE
"Patient with newly diagnosed cardiomyopathy and HFpEF(EF 10%; wearing life vest). Presenting with Acute Heart Failure Exacerbation, c/o abd pain and SOB. On initial exam patient with 's, SBP 90s, and SpO2 100% when O2 turned off. Patient had JVD present and bibasilar rales. Peripheral edema was NOT present in his LEs. BNP 1278, Trop 0.132, AST/ALT 52/67, ALP 53. CXR w/ "no acute findings"    - Consult Heart Transplant Service; Appreciate Assistance   - obtain TTE   - Diurese w/ Lasix 80 BID   - 2L fluid restriction   - Stirct I/Os  - Daily Standing weights   - Daily CMP/Mg  - Metoprolol 25 mg BID; Holding coreg 2/2 SBP in the 90s at time of exam   - Will defer ACE/ARB/ARNI selection to HTS/Cardiology     "

## 2020-10-26 NOTE — PLAN OF CARE
CM received medical records from [facility], will place in chart. Medical team made aware via secure chat, will cont to follow.    Rajani Crawford, MSN  Case Management  Ext [phone]

## 2020-10-26 NOTE — PHARMACY MED REC
"Admission Medication Reconciliation - Pharmacy Consult Note    The home medication history was taken by Faby Villaseñor Pharmacy Tech.     Based on information gathered and subsequent review by the clinical pharmacist, the items below may need attention.    You may go to "Admission" then "Reconcile Home Medications" tabs to review and/or act upon these items.        No issues noted with the medication reconciliation.        Potential issues to be addressed PRIOR TO DISCHARGE  o Unclear why patient on aspirin (unless cardiomyopathy is ischemic in nature).  Primary prevention likely not needed given age and lack of risk factors.  Consider decrease home dose to 81 mg or discontinue  o Optimize GDMT per cardiology recs    Please address this information as you see fit.  Feel free to contact us if you have any questions or require assistance.    Marco Olmstead, Pharm.D., BCPS  [phone]                  .    .          "

## 2020-10-26 NOTE — ED TRIAGE NOTES
Pt here from home with worsening SOB and Chest pain, also having abdominal pain/distention that is tender with palpation, state this is normally where he holds his fluid from his CHF. Pt was recently diagnose with HF with an EF 10%, pt on life vest    Patient Identifiers for Kevan uQeen checked and correct  LOC: The patient is awake, alert and aware of environment with an appropriate affect, the patient is oriented x 3 and speaking appropriate.  APPEARANCE: Patient resting comfortably and in no acute distress, patient is clean and well groomed, patient's clothing is properly fastened.  SKIN: The skin is warm and dry, patient has normal skin turgor and moist mucus membranes,no rashes or lesions.Skin Intact , No Breakdown Noted  Musculoskeletal :  Normal range of motion noted. Moves all extremeties well, No swelling or tenderness noted  RESPIRATORY: Airway is open and patent, respirations are spontaneous, patient has a normal effort and rate.  CARDIAC: Patient has a normal rate and rhythm, no periphreal edema noted, capillary refill < 3 seconds.   ABDOMEN: Soft and tender to palpation, no distention noted.   PULSES: 2+  And symmetrical in all extremeties  NEUROLOGIC: PERRL. facial expression is symmetrical, patient moving all extremities, normal sensation in all extremities when touched with a finger.The patient is awake, alert and cooperative with a calm affect, patient is aware of environment.    Will continue to monitor

## 2020-10-26 NOTE — H&P
"[facility] Hospital Medicine  History & Physical    Patient Name: Kevan Queen  MRN: [id number]  Admission Date: 10/25/2020  Attending Physician: Otoniel Waller MD   Primary Care Provider: To Obtain Unable    Hospital Medicine Team: [facility] 2 Naya Rosales MD     Patient information was obtained from patient, relative(s) and ER records.     Subjective:     Principal Problem:Acute systolic congestive heart failure    Chief Complaint:   Chief Complaint   Patient presents with    Chest Pain     has lifevest on , chest pain vomiting, aicd        HPI: Patient is a 35 y.o. man with recent diagnosis of Cardiomyopathy and HFpEF(EF 10%). He was recently discharged from [facility] two days prior to presenting [place] [facility] [place]. He first noticed difficulty breathing September of 2020. When he first presented to the [facility] he was diagnosed with bronchitis, and then two weeks later with CAP. It was during this most recent presentation with SOB at rest that he was diagnosed with cardiomyopathy and HFpEF. Prior to presenting to the OSH he was experiencing SOB at rest, PND, orthopnea, and peripheral edema to his knees. He was admitted at  for two weeks were he reports 10L of diuresis. Prior to discharge he was fitted with a life vest. Additionally he was discharged on coreg 3.125 BID and lasix 20 daily. He reports that he was compliant with his medication and denies any dietary indiscretion. He does report "feeling off" and uncomfortable when taking coreg. He returned to the ED today complaining of abd pain, N/V, SOB, and palpitations.   He denies fevers, chills, diarrhea, chest pain, peripheral edema, or coughing. Of note, the patient's father also had cardiomyopathy and received a heart transplant. He passed away a year ago due to complications from his heart failure and transplant. Patient denies alcohol, cocaine, or methamphetamine use.  In the ED labs were concerning with BNP 1278, Trop " "0.132, AST/ALT 52/67, ALP 53. His CXR demonstrated "no acute findings." He was given 60 mg of IV lasix and admitted to Hospital Medicine for Acute on Chronic Heart Failure Exacerbation.     Past Medical History:   Diagnosis Date    CHF (congestive heart failure) 10/01/2020       History reviewed. No pertinent surgical history.    Review of patient's allergies indicates:  No Known Allergies    No current facility-administered medications on file prior to encounter.      Current Outpatient Medications on File Prior to Encounter   Medication Sig    ALPRAZolam (XANAX) 0.25 MG tablet Take by mouth 2 (two) times daily as needed for Anxiety.    aspirin (ECOTRIN) 325 MG EC tablet Take 325 mg by mouth once daily.    carvediloL (COREG) 3.125 MG tablet Take 3.125 mg by mouth 2 (two) times daily.    furosemide (LASIX) 20 MG tablet Take 20 mg by mouth once daily.     Family History     Problem Relation (Age of Onset)    Diverticulosis Brother    Heart attack Maternal Grandmother, Maternal Grandfather    Heart failure Father        Tobacco Use    Smoking status: Former Smoker     Packs/day: 0.50     Years: 16.00     Pack years: 8.00     Start date: 10/1/2004     Quit date: 10/1/2020     Years since quittin.0   Substance and Sexual Activity    Alcohol use: Not Currently    Drug use: Not Currently     Types: Marijuana, MDMA (Ecstacy)    Sexual activity: Yes     Partners: Female     Birth control/protection: None     Review of Systems   Constitutional: Negative for activity change, chills, fatigue and fever.   HENT: Negative for congestion, rhinorrhea, sore throat and trouble swallowing.    Eyes: Negative for photophobia and visual disturbance.   Respiratory: Positive for shortness of breath. Negative for cough, chest tightness and wheezing.    Cardiovascular: Positive for palpitations. Negative for chest pain and leg swelling.   Gastrointestinal: Positive for abdominal distention, nausea and vomiting. Negative for " diarrhea.   Genitourinary: Negative for difficulty urinating, dysuria, frequency and hematuria.   Musculoskeletal: Negative for arthralgias and myalgias.   Skin: Negative for rash and wound.   Neurological: Negative for dizziness, syncope, weakness and light-headedness.   Psychiatric/Behavioral: Negative for agitation, behavioral problems and confusion. The patient is nervous/anxious.      Objective:     Vital Signs (Most Recent):  Temp: 97.9 °F (36.6 °C) (10/25/20 1820)  Pulse: 109 (10/25/20 2101)  Resp: (!) 22 (10/25/20 1930)  BP: 96/66 (10/25/20 2101)  SpO2: 100 % (10/25/20 2101) Vital Signs (24h Range):  Temp:  [97.9 °F (36.6 °C)] 97.9 °F (36.6 °C)  Pulse:  [106-109] 109  Resp:  [20-22] 22  SpO2:  [90 %-100 %] 100 %  BP: ()/(66-75) 96/66     Weight: 73 kg (161 lb)  Body mass index is 20.12 kg/m².    Physical Exam  Vitals signs reviewed.   Constitutional:       General: He is not in acute distress.     Appearance: Normal appearance. He is normal weight.   HENT:      Head: Normocephalic and atraumatic.      Nose: Nose normal.      Mouth/Throat:      Mouth: Mucous membranes are dry.      Pharynx: Oropharynx is clear.   Eyes:      General: No scleral icterus.     Extraocular Movements: Extraocular movements intact.      Conjunctiva/sclera: Conjunctivae normal.   Neck:      Musculoskeletal: Normal range of motion and neck supple.      Vascular: JVD present.   Cardiovascular:      Rate and Rhythm: Regular rhythm. Tachycardia present.      Pulses: Normal pulses.      Heart sounds: S1 normal and S2 normal. No gallop.    Pulmonary:      Effort: Pulmonary effort is normal.      Breath sounds: Rales present.   Abdominal:      General: Bowel sounds are normal. There is distension.      Palpations: Abdomen is soft.      Tenderness: There is no abdominal tenderness.   Musculoskeletal: Normal range of motion.      Right lower leg: No edema.      Left lower leg: No edema.   Skin:     General: Skin is warm and dry.  "  Neurological:      General: No focal deficit present.      Mental Status: He is alert and oriented to person, place, and time. Mental status is at baseline.   Psychiatric:         Mood and Affect: Mood normal.         Behavior: Behavior normal.         Thought Content: Thought content normal.         Judgment: Judgment normal.             Significant Labs:   CBC:   Recent Labs   Lab 10/25/20  1900   WBC 10.91   HGB 12.3*   HCT 39.0*        CMP:   Recent Labs   Lab 10/25/20  1900      K 4.9      CO2 18*   *   BUN 23*   CREATININE 1.3   CALCIUM 9.5   PROT 6.9   ALBUMIN 3.3*   BILITOT 0.7   ALKPHOS 53*   AST 52*   ALT 67*   ANIONGAP 12   EGFRNONAA >60.0     Cardiac Markers:   Recent Labs   Lab 10/25/20  1900   BNP 1,278*     Troponin:   Recent Labs   Lab 10/25/20  1900   TROPONINI 0.132*     All pertinent labs within the past 24 hours have been reviewed.    Significant Imaging:   X-Ray Chest AP Portable   Final Result      No acute findings in the chest.      Cardiomegaly.         Electronically signed by: Ricardo Alvarez MD   Date:    10/25/2020   Time:    20:33        I have reviewed all pertinent imaging results/findings within the past 24 hours.    Assessment/Plan:     Anxiety  - continue home Xanax 0.25 mg BID       Acute systolic congestive heart failure  Patient with newly diagnosed cardiomyopathy and HFpEF(EF 10%; wearing life vest). Presenting with Acute Heart Failure Exacerbation, c/o abd pain and SOB. On initial exam patient with 's, SBP 90s, and SpO2 100% when O2 turned off. Patient had JVD present and bibasilar rales. Peripheral edema was NOT present in his LEs. BNP 1278, Trop 0.132, AST/ALT 52/67, ALP 53. CXR w/ "no acute findings"    - Consult Heart Transplant Service; Appreciate Assistance   - obtain TTE   - Diurese w/ Lasix 80 BID   - 2L fluid restriction   - Stirct I/Os  - Daily Standing weights   - Daily CMP/Mg  - Metoprolol 25 mg BID; Holding coreg 2/2 SBP in the " 90s at time of exam   - Will defer ACE/ARB/ARNI selection to HTS/Cardiology         VTE Risk Mitigation (From admission, onward)         Ordered     enoxaparin injection 40 mg  Every 24 hours      10/25/20 2229     IP VTE HIGH RISK PATIENT  Once      10/25/20 2218     Place sequential compression device  Until discontinued      10/25/20 2218                   Naya Rosales MD  Department of Hospital Medicine   [facility]

## 2020-10-26 NOTE — PLAN OF CARE
CM assessment complete, patient AAOX4 and participated in interview at bedside. Pt was transferred from [facility]. He lives at home with mom and has a life ves at bedside. He states he does not use home O2 at this time. He states he ambulates freely without DME equipment. Will cont to follow.       10/26/20 1592   Discharge Assessment   Assessment Type Discharge Planning Assessment   Confirmed/corrected address and phone number on facesheet? Yes   Assessment information obtained from? Patient   Expected Length of Stay (days) 4   Communicated expected length of stay with patient/caregiver yes   Prior to hospitilization cognitive status: Alert/Oriented   Prior to hospitalization functional status: Independent   Current cognitive status: Alert/Oriented   Current Functional Status: Independent   Facility Arrived From: [facility]   Lives With parent(s)   Able to Return to Prior Arrangements yes   Is patient able to care for self after discharge? Yes   Who are your caregiver(s) and their phone number(s)? Malou Tim (mom) [phone]   Patient's perception of discharge disposition home or selfcare   Readmission Within the Last 30 Days no previous admission in last 30 days   Patient currently being followed by outpatient case management? No   Patient currently receives any other outside agency services? No   Equipment Currently Used at Home other (see comments)   Do you have any problems affording any of your prescribed medications? No   Is the patient taking medications as prescribed? yes   Does the patient have transportation home? Yes   Transportation Anticipated family or friend will provide   Does the patient receive services at the Coumadin Clinic? No   Discharge Plan A Home   Discharge Plan B Home   DME Needed Upon Discharge  none   Patient/Family in Agreement with Plan yes     Admit Dx: Shortness of breath [R06.02]  Chest pain [R07.9]  Cardiomyopathy [I42.9]  Acute congestive heart  failure, unspecified heart failure type [I50.9]    Patient Care Team:  To Obtain Unable as PCP - General    Payor: MEDICAID / Plan: [place] / Product Type: Managed Medicaid /     No Pharmacies Listed  Rajani Crawford, MSN  Case Management  Ext [phone]

## 2020-10-26 NOTE — HPI
35-year-old male with past medical history of polysubstance abuse, CHF with EF of 10-15% presented with shortness of breath and epigastric abdominal pain.  He was discharged from [facility] yesterday and drove straight back here.  Going back he presented to [facility]   3 months back for shortness of breath and epigastric abdominal pain and was diagnosed with pneumonia.  He had 1 more visit where they diagnosed him with bronchitis.  He went to [facility] 2 weeks back problems and he was diagnosed with CHF with EF of 10-15% .  he also underwent left heart catheterization that showed clean coronaries.  He has history of polysubstance abuse where he takes marijuana and ecstasy daily.  Also smokes 1 pack /day until 3 months back and he has 16 pack-year smoking history . He smoked marijuana yesterday after he was discharged from [facility].  Currently complains of having orthopnea, epigastric abdominal pain.  Also complains of having shortness of breath whenever he exerts himself.  Denies having any nausea vomiting chest pain, palpitations.  His father also had history of cardiomyopathy and underwent heart transplant and he  last year due to heart failure.

## 2020-10-26 NOTE — HPI
"Patient is a 35 y.o. man with recent diagnosis of Cardiomyopathy and HFrEF(EF 10%). He was recently discharged from [facility]() two days prior to presenting to [facility] ED. He first noticed difficulty breathing September of 2020. When he first presented to the OSH he was diagnosed with bronchitis, and then two weeks later with CAP. It was during this most recent presentation with SOB at rest that he was diagnosed with cardiomyopathy and HFrEF. Prior to presenting to the OSH he was experiencing SOB at rest, PND, orthopnea, and peripheral edema to his knees. He was admitted at  for two weeks were he reports 10L of diuresis. Prior to discharge he was fitted with a life vest. Additionally he was discharged on coreg 3.125 BID and lasix 20 daily. He reports that he was compliant with his medication and denies any dietary indiscretion. He does report "feeling off" and uncomfortable when taking coreg. He returned to the ED today complaining of abd pain, N/V, SOB, and palpitations.   He denies fevers, chills, diarrhea, chest pain, peripheral edema, or coughing. Of note, the patient's father also had cardiomyopathy and received a heart transplant. He passed away a year ago due to complications from his heart failure and transplant. Patient denies alcohol, cocaine, or methamphetamine use.  In the ED labs were concerning with BNP 1278, Trop 0.132, AST/ALT 52/67, ALP 53. His CXR demonstrated "no acute findings." He was given 60 mg of IV lasix and admitted to Hospital Medicine for Acute on Chronic Heart Failure Exacerbation.   "

## 2020-10-26 NOTE — MEDICAL/APP STUDENT
Progress Note  Hospital Medicine    [place] [facility] [place] [place] 1  Admit Date: 10/25/2020   Length of Stay:  LOS: 1 day   SUBJECTIVE:   Reason for Admission:  Acute on chronic systolic congestive heart failure    HPI/Interval history:    34 yo man recently dx with cardiomyopathy and HFrEF (EF 10%), PMHx anxiety. First noticed difficulty breathing in Sept 2020, went to [facility] and was diagnosed with bronchitis then, two weeks later, with CAP. He presented with SOB at rest, PND, orthopnea, peripheral edema to knees during his most recent hospitalization at [facility], and was diagnosed with cardiomyopathy and HFpEF. At  he diuresed 10L and was discharged with a cardiac LifeVest. DCd with Lasix 20qd, carvedilol 3.125BID.     Presented to ED last night with abdo pain, N/V, SOB, and palpitations. No fevers, chills, diarrhea, chest pain, peripheral edema, coughing. No hx of alcohol, cocaine, meth use. Reported using 10-20 ecstasy tabs per day for the past 10 years. Had used ecstasy since age 18, but ceased while incarcerated from age [age]. 8PY smoking history with 0.5ppd.  His father had a hx of cardiomyopathy and heart transplant before passing last year due to complications from HF and transplant.     ED: BNP 1278, Trop WNL 0.132, AST/ALT 52/67, ALP 53. Presumptive positive THC and benzodiazepines. EKG No acute findings on CXR, but noticeable cardiomegaly, given 60mg IV lasix and admitted for acute/chronic HF exacerbation.     VINNYON. Patient reports that prn Xanax did not help him sleep last night. Abdominal pain continues and patient is keen to eat given his NPO status.     Review of Systems:  Constitutional: no fever or chills  Respiratory: no cough or shortness of breath  Cardiovascular: no chest pain or palpitations  Gastrointestinal: positive for abdominal pain  Neurological: no seizures or tremors  Behavioral/Psych: no auditory or visual hallucinations     OBJECTIVE:     Temp:   [97.4 °F (36.3 °C)-98.6 °F (37 °C)]   Pulse:  []   Resp:  [16-22]   BP: ()/(59-75)   SpO2:  [90 %-100 %]  Body mass index is 21.6 kg/m².  Intake/Outake:  This Shift:  No intake/output data recorded.    Net I/O past 24h:     Intake/Output Summary (Last 24 hours) at 10/26/2020 1227  Last data filed at 10/26/2020 0000  Gross per 24 hour   Intake 240 ml   Output 1000 ml   Net -760 ml             Physical Exam:  General: well developed, appears older than stated age, no distress  Lungs: clear to auscultation bilaterally and normal respiratory effort  Cardiovascular: Tachycardic, S1, S2 normal, no murmur, click, rub or gallop. JVD to 10cm.  Extremities: Homans sign is negative, no sign of DVT.  Abdomen/Rectal: Abdomen: Bilateral upper quadrant tenderness to palpation. Normal BS, no distension, no masses.  Psych/Behavioral:  Alert and oriented, appropriate affect.    Laboratory:  CBC/Anemia Labs: Coags:    Recent Labs   Lab 10/25/20  1900 10/26/20  0426 10/26/20  0811   WBC 10.91 13.44*  --    HGB 12.3* 11.3*  --    HCT 39.0* 36.3*  --     290  --    MCV 91 91  --    RDW 17.3* 17.6*  --    FOLATE  --   --  9.4   [id number]  --   --  630    No results for input(s): PT, INR, APTT in the last 168 hours.     Chemistries:   Recent Labs   Lab 10/25/20  1900 10/26/20  0426    140   K 4.9 4.4    104   CO2 18* 24   BUN 23* 27*   CREATININE 1.3 1.6*   CALCIUM 9.5 9.4   PROT 6.9 6.6   BILITOT 0.7 0.9   ALKPHOS 53* 56   ALT 67* 63*   AST 52* 50*   MG 1.9 1.8   PHOS  --  4.6*        Medications:  Scheduled Meds:   enoxaparin  40 mg Subcutaneous Q24H    [START ON 10/27/2020] furosemide (LASIX) IV  40 mg Intravenous Q12H    metoprolol succinate  12.5 mg Oral Daily    spironolactone  25 mg Oral Daily                             Continuous Infusions:  PRN Meds:.acetaminophen, ALPRAZolam, hydrOXYzine HCL, ondansetron, polyethylene glycol, prochlorperazine, sodium chloride 0.9%     ASSESSMENT/PLAN:     The  patient is a 36yo man with acute on chronic systolic CHF exacerbation, ROC, Generalized anxiety disorder, anemia of chronic disease, transaminitis, and use of a LifeVest defibrillator.    Acute on chronic systolic CHF exacerbation  Acute systolic CHF exacerbation possibly 2/2 genetic cardiomyopathy or drug-induced  -Lasix 80 IV bid, Lasix 80tid if not sufficient diuresis in 24h  -TTE scheduled  -Metoprolol tartrate (Lopressor) 25mg po bid, switch to 12.5 if continued hypotension  -Consult HF/CardiacTransplant service--awaiting recs on ace/arbs/spironolactone   -Cardiac monitoring  -PT/OT  -O2 prn    ROC  -Monitor CMP  -Diuresis (see acute on chronic systolic CHF exacerbation)    Generalized Anxiety Disorder  -alprazolam 0.25mg po bid prn  -hydroxyzine 50mg po tid prn     Anemia of chronic disease  -Monitor daily CBC    Transaminitis  -Monitor with daily CMP    Uses LifeVest defibrillator  -Patient says that battery is running low and that his mother was going to return with a replacement battery.     Active Hospital Problems    Diagnosis  POA    *Acute on chronic systolic congestive heart failure [I50.23]  Yes    ROC (acute kidney injury) [N17.9]  Yes    Cardiomyopathy of undetermined type [I42.9]  Yes    Anemia of chronic disease [D63.8]  Yes    Uses LifeVest defibrillator [Z95.810]  Yes    Transaminitis [R74.01]  Unknown    Generalized anxiety disorder [F41.1]  Yes      Resolved Hospital Problems   No resolved problems to display.     DVT ppx- Enoxaparin 40mg subQ qd  CODE Status- Full    Dispo-DC pending consult by cardiac transplant service    Aron Alvarez - MS4  Hospital Medicine Team 2

## 2020-10-26 NOTE — PROGRESS NOTES
Pharmacist Renal Dose Adjustment Note    Kevan Queen is a 35 y.o. male being treated with enoxaparin for DVT prophylaxis.     Patient Data:    Vital Signs (Most Recent):  Temp: 97.9 °F (36.6 °C) (10/25/20 1820)  Pulse: 109 (10/25/20 2101)  Resp: (!) 22 (10/25/20 1930)  BP: 96/66 (10/25/20 2101)  SpO2: 100 % (10/25/20 2101)   Vital Signs (72h Range):  Temp:  [97.9 °F (36.6 °C)]   Pulse:  [106-109]   Resp:  [20-22]   BP: ()/(66-75)   SpO2:  [90 %-100 %]      Recent Labs   Lab 10/25/20  1900   CREATININE 1.3     Serum creatinine: 1.3 mg/dL 10/25/20 1900  Estimated creatinine clearance: 81.9 mL/min    Enoxaparin 30 mg subcutaneous every 24 hours will be changed to enoxaparin 40 mg subcutaneous every 24 hours    Pharmacist's Name:  Yeny Hayward  Pharmacist's Extension:  [phone]

## 2020-10-26 NOTE — CONSULTS
[facility]  Heart Transplant  Consult Note    Patient Name: Kevan Queen  MRN: [id number]  Admission Date: 10/25/2020  Hospital Length of Stay: 1 days  Attending Physician: Otoniel Waller MD  Primary Care Provider: To Obtain Unable   Principal Problem:Acute on chronic systolic congestive heart failure    Consults  Subjective:     History of Present Illness:  35-year-old male with past medical history of polysubstance abuse, CHF with EF of 10-15% presented with shortness of breath and epigastric abdominal pain.  He was discharged from [facility] yesterday and drove straight back here.  Going back he presented to [facility]   3 months back for shortness of breath and epigastric abdominal pain and was diagnosed with pneumonia.  He had 1 more visit where they diagnosed him with bronchitis.  He went to [facility] 2 weeks back problems and he was diagnosed with CHF with EF of 10-15% .  he also underwent left heart catheterization that showed clean coronaries.  He has history of polysubstance abuse where he takes marijuana and ecstasy daily.  Also smokes 1 pack /day until 3 months back and he has 16 pack-year smoking history . He smoked marijuana yesterday after he was discharged from [facility].  Currently complains of having orthopnea, epigastric abdominal pain.  Also complains of having shortness of breath whenever he exerts himself.  Denies having any nausea vomiting chest pain, palpitations.  His father also had history of cardiomyopathy and underwent heart transplant and he  last year due to heart failure.     Past Medical History:   Diagnosis Date    CHF (congestive heart failure) 10/01/2020       History reviewed. No pertinent surgical history.    Review of patient's allergies indicates:  No Known Allergies    Current Facility-Administered Medications   Medication    acetaminophen tablet 650 mg    ALPRAZolam tablet 0.25 mg    enoxaparin injection 40 mg     [START ON 10/27/2020] furosemide injection 40 mg    hydrOXYzine HCL tablet 50 mg    metoprolol succinate (TOPROL-XL) 24 hr split tablet 12.5 mg    ondansetron disintegrating tablet 8 mg    polyethylene glycol packet 17 g    prochlorperazine injection Soln 5 mg    sodium chloride 0.9% flush 10 mL    spironolactone tablet 25 mg     Family History     Problem Relation (Age of Onset)    Diverticulosis Brother    Heart attack Maternal Grandmother, Maternal Grandfather    Heart failure Father        Tobacco Use    Smoking status: Former Smoker     Packs/day: 0.50     Years: 16.00     Pack years: 8.00     Start date: 10/1/2004     Quit date: 10/1/2020     Years since quittin.0   Substance and Sexual Activity    Alcohol use: Not Currently    Drug use: Not Currently     Types: Marijuana, MDMA (Ecstacy)    Sexual activity: Yes     Partners: Female     Birth control/protection: None     Review of Systems   Constitutional: Positive for fatigue.   HENT: Negative.    Eyes: Negative.    Respiratory: Positive for shortness of breath.    Cardiovascular: Negative.    Gastrointestinal: Positive for abdominal distention and abdominal pain.   Endocrine: Negative.    Genitourinary: Negative.    Musculoskeletal: Negative.    Neurological: Negative.    Hematological: Negative.      Objective:     Vital Signs (Most Recent):  Temp: 98.6 °F (37 °C) (10/26/20 0756)  Pulse: 102 (10/26/20 0756)  Resp: 18 (10/26/20 075)  BP: 97/68 (10/26/20 0756)  SpO2: 98 % (10/26/20 0756) Vital Signs (24h Range):  Temp:  [97.9 °F (36.6 °C)-98.6 °F (37 °C)] 98.6 °F (37 °C)  Pulse:  [] 102  Resp:  [18-22] 18  SpO2:  [90 %-100 %] 98 %  BP: ()/(59-75) 97/68     Patient Vitals for the past 72 hrs (Last 3 readings):   Weight   10/26/20 0400 78.4 kg (172 lb 13.5 oz)   10/25/20 2101 78.4 kg (172 lb 13.5 oz)   10/25/20 1820 73 kg (161 lb)     Body mass index is 21.6 kg/m².      Intake/Output Summary (Last 24 hours) at 10/26/2020  0918  Last data filed at 10/26/2020 0000  Gross per 24 hour   Intake 240 ml   Output 1000 ml   Net -760 ml       Physical Exam  Constitutional:       Appearance: Normal appearance.   HENT:      Head: Normocephalic and atraumatic.      Nose: Nose normal.      Mouth/Throat:      Mouth: Mucous membranes are moist.   Eyes:      Extraocular Movements: Extraocular movements intact.      Pupils: Pupils are equal, round, and reactive to light.   Neck:      Musculoskeletal: Normal range of motion and neck supple.      Comments: JVP at 18 cm water  Cardiovascular:      Rate and Rhythm: Regular rhythm. Tachycardia present.      Heart sounds: Gallop present.    Pulmonary:      Effort: Pulmonary effort is normal.      Breath sounds: Wheezing present.   Abdominal:      General: Abdomen is flat. Bowel sounds are normal. There is distension.      Palpations: Abdomen is soft.      Tenderness: There is abdominal tenderness.      Comments: Tender hepatomegaly present   Liver span 16 cm   Musculoskeletal: Normal range of motion.   Skin:     General: Skin is warm.      Capillary Refill: Capillary refill takes less than 2 seconds.   Neurological:      General: No focal deficit present.      Mental Status: He is alert and oriented to person, place, and time.         Significant Labs:  CBC:  Recent Labs   Lab 10/25/20  1900 10/26/20  0426   WBC 10.91 13.44*   RBC 4.31* 4.00*   HGB 12.3* 11.3*   HCT 39.0* 36.3*    290   MCV 91 91   MCH 28.5 28.3   MCHC 31.5* 31.1*     BNP:  Recent Labs   Lab 10/25/20  1900   BNP 1,278*     CMP:  Recent Labs   Lab 10/25/20  1900 10/26/20  0426   * 111*   CALCIUM 9.5 9.4   ALBUMIN 3.3* 3.1*   PROT 6.9 6.6    140   K 4.9 4.4   CO2 18* 24    104   BUN 23* 27*   CREATININE 1.3 1.6*   ALKPHOS 53* 56   ALT 67* 63*   AST 52* 50*   BILITOT 0.7 0.9      Coagulation:   No results for input(s): PT, INR, APTT in the last 168 hours.  LDH:  No results for input(s): LDH in the last 72  hours.  Microbiology:  Microbiology Results (last 7 days)     ** No results found for the last 168 hours. **          I have reviewed all pertinent labs within the past 24 hours.    Diagnostic Results:  I have reviewed and interpreted all pertinent imaging results/findings within the past 24 hours.    Assessment/Plan:     * Acute on chronic systolic congestive heart failure  Nonischemic cardiomyopathy with EF of 10-15%.  Pending echocardiogram look at the LV wall size  He looks warm and wet  Left heart catheterization showed clean coronaries according to the patient.  Please request for left heart catheterization from [facility]  He is volume up.  His JVP is at 18 cm of water. Recommend diuresing with Lasix at 20 mg/hr after IV push Lasix 80 mg  Please check for ferritin, TSH.   GDMT- Recommend starting on hydralazine/Isordil 10 mg TID .   As creatinine starts to improve recommends adding lisinopril.   Currently He will not be candidate for any advance options because of his polysubstance abuse    Transaminitis  Elevated transaminitis due to congestive hepatopathy.   Given history of polysubstance abuse please check his hepatitis panel, HIV.   Continue to monitor with diuresis    Anemia of chronic disease  Normocytic normochromic anemia .   Please check vitamin B12, folate , Ferritin Levels     ROC (acute kidney injury)  Cardiorenal.  Continue to monitor with diuresis.        Thank you for your consult. I will follow-up with patient. Please contact us if you have any additional questions.    Joaquín Guajardo MD  Heart Transplant  [facility]

## 2020-10-26 NOTE — PLAN OF CARE
10/26/20 0854   Post-Acute Status   Post-Acute Authorization Other   Other Status No Post-Acute Service Needs

## 2020-10-27 LAB
ALBUMIN SERPL BCP-MCNC: 3.3 G/DL (ref 3.5–5.2)
ALP SERPL-CCNC: 61 U/L (ref 55–135)
ALT SERPL W/O P-5'-P-CCNC: 76 U/L (ref 10–44)
ANION GAP SERPL CALC-SCNC: 14 MMOL/L (ref 8–16)
AST SERPL-CCNC: 60 U/L (ref 10–40)
BASOPHILS # BLD AUTO: 0.03 K/UL (ref 0–0.2)
BASOPHILS NFR BLD: 0.3 % (ref 0–1.9)
BILIRUB SERPL-MCNC: 1 MG/DL (ref 0.1–1)
BUN SERPL-MCNC: 25 MG/DL (ref 6–20)
CALCIUM SERPL-MCNC: 9.5 MG/DL (ref 8.7–10.5)
CHLORIDE SERPL-SCNC: 99 MMOL/L (ref 95–110)
CO2 SERPL-SCNC: 29 MMOL/L (ref 23–29)
CREAT SERPL-MCNC: 1.4 MG/DL (ref 0.5–1.4)
DIFFERENTIAL METHOD: ABNORMAL
EOSINOPHIL # BLD AUTO: 0.1 K/UL (ref 0–0.5)
EOSINOPHIL NFR BLD: 0.8 % (ref 0–8)
ERYTHROCYTE [DISTWIDTH] IN BLOOD BY AUTOMATED COUNT: 16.8 % (ref 11.5–14.5)
EST. GFR  (AFRICAN AMERICAN): >60 ML/MIN/1.73 M^2
EST. GFR  (NON AFRICAN AMERICAN): >60 ML/MIN/1.73 M^2
FERRITIN SERPL-MCNC: 215 NG/ML (ref 20–300)
GLUCOSE SERPL-MCNC: 115 MG/DL (ref 70–110)
HAV IGM SERPL QL IA: NEGATIVE
HBV CORE IGM SERPL QL IA: NEGATIVE
HBV SURFACE AG SERPL QL IA: NEGATIVE
HCT VFR BLD AUTO: 34.4 % (ref 40–54)
HCV AB SERPL QL IA: NEGATIVE
HGB BLD-MCNC: 11.6 G/DL (ref 14–18)
HIV 1+2 AB+HIV1 P24 AG SERPL QL IA: NEGATIVE
IMM GRANULOCYTES # BLD AUTO: 0.02 K/UL (ref 0–0.04)
IMM GRANULOCYTES NFR BLD AUTO: 0.2 % (ref 0–0.5)
IRON SERPL-MCNC: 60 UG/DL (ref 45–160)
LYMPHOCYTES # BLD AUTO: 4.2 K/UL (ref 1–4.8)
LYMPHOCYTES NFR BLD: 40.8 % (ref 18–48)
MAGNESIUM SERPL-MCNC: 1.6 MG/DL (ref 1.6–2.6)
MCH RBC QN AUTO: 29.1 PG (ref 27–31)
MCHC RBC AUTO-ENTMCNC: 33.7 G/DL (ref 32–36)
MCV RBC AUTO: 86 FL (ref 82–98)
MONOCYTES # BLD AUTO: 0.8 K/UL (ref 0.3–1)
MONOCYTES NFR BLD: 7.5 % (ref 4–15)
NEUTROPHILS # BLD AUTO: 5.2 K/UL (ref 1.8–7.7)
NEUTROPHILS NFR BLD: 50.4 % (ref 38–73)
NRBC BLD-RTO: 0 /100 WBC
PHOSPHATE SERPL-MCNC: 4.2 MG/DL (ref 2.7–4.5)
PLATELET # BLD AUTO: 278 K/UL (ref 150–350)
PMV BLD AUTO: 10.3 FL (ref 9.2–12.9)
POTASSIUM SERPL-SCNC: 3.5 MMOL/L (ref 3.5–5.1)
PROT SERPL-MCNC: 7 G/DL (ref 6–8.4)
RBC # BLD AUTO: 3.99 M/UL (ref 4.6–6.2)
SATURATED IRON: 11 % (ref 20–50)
SODIUM SERPL-SCNC: 142 MMOL/L (ref 136–145)
T4 FREE SERPL-MCNC: 1.04 NG/DL (ref 0.71–1.51)
TOTAL IRON BINDING CAPACITY: 530 UG/DL (ref 250–450)
TRANSFERRIN SERPL-MCNC: 358 MG/DL (ref 200–375)
TSH SERPL DL<=0.005 MIU/L-ACNC: 4.09 UIU/ML (ref 0.4–4)
WBC # BLD AUTO: 10.34 K/UL (ref 3.9–12.7)

## 2020-10-27 PROCEDURE — 99233 PR SUBSEQUENT HOSPITAL CARE,LEVL III: ICD-10-PCS | Mod: ,,, | Performed by: INTERNAL MEDICINE

## 2020-10-27 PROCEDURE — 25000003 PHARM REV CODE 250: Performed by: INTERNAL MEDICINE

## 2020-10-27 PROCEDURE — 85025 COMPLETE CBC W/AUTO DIFF WBC: CPT

## 2020-10-27 PROCEDURE — 83540 ASSAY OF IRON: CPT

## 2020-10-27 PROCEDURE — 25000003 PHARM REV CODE 250: Performed by: STUDENT IN AN ORGANIZED HEALTH CARE EDUCATION/TRAINING PROGRAM

## 2020-10-27 PROCEDURE — 63600175 PHARM REV CODE 636 W HCPCS: Performed by: STUDENT IN AN ORGANIZED HEALTH CARE EDUCATION/TRAINING PROGRAM

## 2020-10-27 PROCEDURE — 80074 ACUTE HEPATITIS PANEL: CPT

## 2020-10-27 PROCEDURE — 97161 PT EVAL LOW COMPLEX 20 MIN: CPT

## 2020-10-27 PROCEDURE — 80053 COMPREHEN METABOLIC PANEL: CPT

## 2020-10-27 PROCEDURE — 86703 HIV-1/HIV-2 1 RESULT ANTBDY: CPT

## 2020-10-27 PROCEDURE — 84439 ASSAY OF FREE THYROXINE: CPT

## 2020-10-27 PROCEDURE — 84100 ASSAY OF PHOSPHORUS: CPT

## 2020-10-27 PROCEDURE — 99233 SBSQ HOSP IP/OBS HIGH 50: CPT | Mod: ,,, | Performed by: INTERNAL MEDICINE

## 2020-10-27 PROCEDURE — 83735 ASSAY OF MAGNESIUM: CPT

## 2020-10-27 PROCEDURE — 36415 COLL VENOUS BLD VENIPUNCTURE: CPT

## 2020-10-27 PROCEDURE — 97165 OT EVAL LOW COMPLEX 30 MIN: CPT

## 2020-10-27 PROCEDURE — 84425 ASSAY OF VITAMIN B-1: CPT

## 2020-10-27 PROCEDURE — 11000001 HC ACUTE MED/SURG PRIVATE ROOM

## 2020-10-27 PROCEDURE — 97110 THERAPEUTIC EXERCISES: CPT

## 2020-10-27 PROCEDURE — 84443 ASSAY THYROID STIM HORMONE: CPT

## 2020-10-27 PROCEDURE — 82728 ASSAY OF FERRITIN: CPT

## 2020-10-27 RX ORDER — CYCLOBENZAPRINE HCL 5 MG
5 TABLET ORAL 3 TIMES DAILY PRN
Status: DISCONTINUED | OUTPATIENT
Start: 2020-10-27 | End: 2020-10-30 | Stop reason: HOSPADM

## 2020-10-27 RX ORDER — METOPROLOL SUCCINATE 25 MG/1
25 TABLET, EXTENDED RELEASE ORAL DAILY
Status: DISCONTINUED | OUTPATIENT
Start: 2020-10-28 | End: 2020-10-29

## 2020-10-27 RX ORDER — MAGNESIUM SULFATE HEPTAHYDRATE 40 MG/ML
2 INJECTION, SOLUTION INTRAVENOUS
Status: DISPENSED | OUTPATIENT
Start: 2020-10-27 | End: 2020-10-27

## 2020-10-27 RX ORDER — POTASSIUM CHLORIDE 20 MEQ/1
20 TABLET, EXTENDED RELEASE ORAL 2 TIMES DAILY
Status: DISCONTINUED | OUTPATIENT
Start: 2020-10-28 | End: 2020-10-28

## 2020-10-27 RX ORDER — POTASSIUM CHLORIDE 20 MEQ/1
40 TABLET, EXTENDED RELEASE ORAL ONCE
Status: COMPLETED | OUTPATIENT
Start: 2020-10-27 | End: 2020-10-27

## 2020-10-27 RX ORDER — LISINOPRIL 2.5 MG/1
2.5 TABLET ORAL 2 TIMES DAILY
Status: DISCONTINUED | OUTPATIENT
Start: 2020-10-27 | End: 2020-10-28

## 2020-10-27 RX ADMIN — LISINOPRIL 2.5 MG: 2.5 TABLET ORAL at 08:10

## 2020-10-27 RX ADMIN — CYCLOBENZAPRINE HYDROCHLORIDE 5 MG: 5 TABLET, FILM COATED ORAL at 12:10

## 2020-10-27 RX ADMIN — SPIRONOLACTONE 25 MG: 25 TABLET ORAL at 08:10

## 2020-10-27 RX ADMIN — LISINOPRIL 2.5 MG: 2.5 TABLET ORAL at 03:10

## 2020-10-27 RX ADMIN — ACETAMINOPHEN 650 MG: 325 TABLET ORAL at 05:10

## 2020-10-27 RX ADMIN — MAGNESIUM SULFATE IN WATER 2 G: 40 INJECTION, SOLUTION INTRAVENOUS at 01:10

## 2020-10-27 RX ADMIN — POTASSIUM CHLORIDE 40 MEQ: 1500 TABLET, EXTENDED RELEASE ORAL at 08:10

## 2020-10-27 RX ADMIN — FUROSEMIDE 10 MG/HR: 10 INJECTION, SOLUTION INTRAMUSCULAR; INTRAVENOUS at 03:10

## 2020-10-27 RX ADMIN — ENOXAPARIN SODIUM 40 MG: 40 INJECTION SUBCUTANEOUS at 05:10

## 2020-10-27 RX ADMIN — METOPROLOL SUCCINATE 12.5 MG: 25 TABLET, EXTENDED RELEASE ORAL at 08:10

## 2020-10-27 NOTE — HOSPITAL COURSE
Patient was admitted to hospital medicine for evaluation of advanced heart failure. On admission patient had signs of acute fluid overload and was given one time 80 mg of lasix with BNP 1278 with stable vital signs. Patient was sign by advanced heart failure/heart transplant who have given updated recommendations as well as evaluation for possible transplant. Patient currently not candidate due to polysubstance abuse. TTE performed on 10/26 showed severe left atrial enlargement, severe left ventricular eccentric hypertrophy. The left ventricle is severely enlarged with severely decreased systolic function. The estimated ejection fraction is 10%. There is severe left ventricular global hypokinesis. Grade II diastolic dysfunction. Mild right ventricular enlargement.Moderately reduced right ventricular systolic function.Moderate-to-severe mitral regurgitation. Patient started on toprol 25mg, lisinopril 2.5 mg BID, and IV diuresis. Patient discontinued off lasix gtt to lasix 40 BID on 10/28 with diuresis of 10 L net and 10 kg weight change since admission. Patient has maintained low BP with SBP in 80-90 range that has been sustained since 10/28. Patient stable with good perfusion and peripheral pulses on multiple examination and shows no signs of cardiogenic shock despite hypotension. HTS recommends adjustment to metoprolol 12.5 daily, lisinopril 2.5 daily first to progression to BID and discontinue spironolactone.

## 2020-10-27 NOTE — PROGRESS NOTES
"[facility] Hospital Medicine  Progress Note    Patient Name: Kevan Queen  MRN: [id number]  Patient Class: IP- Inpatient   Admission Date: 10/25/2020  Length of Stay: 2 days  Attending Physician: Otoniel Waller MD  Primary Care Provider: To Obtain Unable    Hospital Medicine Team: [facility] HEART TRANSPLANT TEAM 1 Aris Ureña MD    Subjective:     Principal Problem:Acute on chronic systolic congestive heart failure        HPI:  Patient is a 35 y.o. man with recent diagnosis of Cardiomyopathy and HFrEF(EF 10%). He was recently discharged from [facility]) two days prior to presenting to [facility] ED. He first noticed difficulty breathing September of 2020. When he first presented to the OSH he was diagnosed with bronchitis, and then two weeks later with CAP. It was during this most recent presentation with SOB at rest that he was diagnosed with cardiomyopathy and HFrEF. Prior to presenting to the OSH he was experiencing SOB at rest, PND, orthopnea, and peripheral edema to his knees. He was admitted at  for two weeks were he reports 10L of diuresis. Prior to discharge he was fitted with a life vest. Additionally he was discharged on coreg 3.125 BID and lasix 20 daily. He reports that he was compliant with his medication and denies any dietary indiscretion. He does report "feeling off" and uncomfortable when taking coreg. He returned to the ED today complaining of abd pain, N/V, SOB, and palpitations.   He denies fevers, chills, diarrhea, chest pain, peripheral edema, or coughing. Of note, the patient's father also had cardiomyopathy and received a heart transplant. He passed away a year ago due to complications from his heart failure and transplant. Patient denies alcohol, cocaine, or methamphetamine use.  In the ED labs were concerning with BNP 1278, Trop 0.132, AST/ALT 52/67, ALP 53. His CXR demonstrated "no acute findings." He was given 60 mg of IV lasix and admitted to Hospital Medicine for " Acute on Chronic Heart Failure Exacerbation.     Overview/Hospital Course:  Patient was admitted to hospital medicine for evaluation of advanced heart failure. On admission patient had signs of acute fluid overload and was given one time 80 mg of lasix with BNP 1278 with stable vital signs. Patient was sign by advanced heart failure/heart transplant who have given updated recommendations as well as evaluation for possible transplant. Patient currently not candidate due to polysubstance abuse. TTE performed on 10/26 showed severe left atrial enlargement, severe left ventricular eccentric hypertrophy. The left ventricle is severely enlarged with severely decreased systolic function. The estimated ejection fraction is 10%. There is severe left ventricular global hypokinesis. Grade II diastolic dysfunction. Mild right ventricular enlargement.Moderately reduced right ventricular systolic function.Moderate-to-severe mitral regurgitation. Patient started on toprol 25mg, lisinopril 2.5 mg BID, and IV diuresis.     Interval History: NAEON. Patient reports feeling better since diuresis but is complaining of associated generalized muscle cramps. Patient denies any constitutional, cardiac, respiratory, GI,  complaints currently.      Review of Systems   Constitutional: Negative for activity change, chills, fatigue and fever.   HENT: Negative for congestion, rhinorrhea, sore throat and trouble swallowing.    Eyes: Negative for photophobia and visual disturbance.   Respiratory: Negative for cough, chest tightness, shortness of breath and wheezing.    Cardiovascular: Negative for chest pain, palpitations and leg swelling.   Gastrointestinal: Negative for abdominal distention, diarrhea, nausea and vomiting.   Genitourinary: Negative for difficulty urinating, dysuria, frequency and hematuria.   Musculoskeletal: Positive for myalgias. Negative for arthralgias.   Skin: Negative for rash and wound.   Neurological: Negative for  dizziness, syncope, weakness and light-headedness.   Psychiatric/Behavioral: Negative for agitation, behavioral problems and confusion. The patient is not nervous/anxious.      Objective:     Vital Signs (Most Recent):  Temp: 98.4 °F (36.9 °C) (10/27/20 1232)  Pulse: 106 (10/27/20 1232)  Resp: 20 (10/27/20 1232)  BP: 110/66 (10/27/20 1232)  SpO2: 97 % (10/27/20 1232) Vital Signs (24h Range):  Temp:  [97.7 °F (36.5 °C)-98.4 °F (36.9 °C)] 98.4 °F (36.9 °C)  Pulse:  [] 106  Resp:  [18-20] 20  SpO2:  [93 %-99 %] 97 %  BP: (104-116)/(66-72) 110/66     Weight: 78.4 kg (172 lb 13.5 oz)  Body mass index is 21.6 kg/m².    Intake/Output Summary (Last 24 hours) at 10/27/2020 1410  Last data filed at 10/27/2020 0800  Gross per 24 hour   Intake 540 ml   Output 1310 ml   Net -770 ml      Physical Exam  Vitals signs and nursing note reviewed.   Constitutional:       Appearance: Normal appearance.   HENT:      Head: Normocephalic and atraumatic.      Nose: Nose normal.      Mouth/Throat:      Mouth: Mucous membranes are moist.   Eyes:      Extraocular Movements: Extraocular movements intact.      Pupils: Pupils are equal, round, and reactive to light.   Neck:      Musculoskeletal: Normal range of motion and neck supple.      Comments: JVP mildly elevated  Cardiovascular:      Rate and Rhythm: Regular rhythm. Tachycardia present.      Pulses: Normal pulses.      Heart sounds: No murmur. Gallop present.    Pulmonary:      Effort: Pulmonary effort is normal. No respiratory distress.      Breath sounds: No wheezing.   Abdominal:      General: Abdomen is flat. Bowel sounds are normal. There is no distension.      Palpations: Abdomen is soft.      Tenderness: There is no abdominal tenderness.   Musculoskeletal: Normal range of motion.   Skin:     General: Skin is warm.      Capillary Refill: Capillary refill takes less than 2 seconds.   Neurological:      General: No focal deficit present.      Mental Status: He is alert and  "oriented to person, place, and time.   Psychiatric:         Mood and Affect: Mood normal.         Behavior: Behavior normal.         Significant Labs:   CBC:   Recent Labs   Lab 10/25/20  1900 10/26/20  0426 10/27/20  0410   WBC 10.91 13.44* 10.34   HGB 12.3* 11.3* 11.6*   HCT 39.0* 36.3* 34.4*    290 278     CMP:   Recent Labs   Lab 10/25/20  1900 10/26/20  0426 10/27/20  0410    140 142   K 4.9 4.4 3.5    104 99   CO2 18* 24 29   * 111* 115*   BUN 23* 27* 25*   CREATININE 1.3 1.6* 1.4   CALCIUM 9.5 9.4 9.5   PROT 6.9 6.6 7.0   ALBUMIN 3.3* 3.1* 3.3*   BILITOT 0.7 0.9 1.0   ALKPHOS 53* 56 61   AST 52* 50* 60*   ALT 67* 63* 76*   ANIONGAP 12 12 14   EGFRNONAA >60.0 55.0* >60.0     Magnesium:   Recent Labs   Lab 10/25/20  1900 10/26/20  0426 10/27/20  0410   MG 1.9 1.8 1.6     TSH:   Recent Labs   Lab 10/27/20  0410   TSH 4.092*       Significant Imaging: I have reviewed all pertinent imaging results/findings within the past 24 hours.      Assessment/Plan:      * Acute on chronic systolic congestive heart failure  Patient with newly diagnosed cardiomyopathy and HFpEF(EF 10%; wearing life vest). Presenting with Acute Heart Failure Exacerbation, c/o abd pain and SOB. On initial exam patient with 's, SBP 90s, and SpO2 100% when O2 turned off. Patient had JVD present and bibasilar rales. Peripheral edema was NOT present in his LEs. BNP 1278, Trop 0.132, AST/ALT 52/67, ALP 53. CXR w/ "no acute findings." Bibasilar rales has resolved and still no signs of peripheral edema. Patient improving with aggressive diuresis as patient has UOP of 9L since admission. TTE shows severe left atrial enlargement, moderate to severe MR, grade 2 diastolic dysfunction and EF 10%. Encourage patient to wear lifevest and avoid polysubstance abuse.     Plan  - Consult Heart Transplant Service; Appreciate Assistance   - Diurese w/ Lasix 80 BID   - 2L fluid restriction   - Stirct I/Os  - Daily Standing weights   - " Daily CMP/Mg and phos   - Possible daily potassium replacement  - Metoprolol 25 mg daily, lisinopril 2.5 BID, spironolactone 25 mg daily    - Holding coreg 2/2 SBP in the 90s at time of exam         Transaminitis  Patient continues to have elevated with AST/ALT 60/76 and is likely associated with congestive hepatopathy. Patient has some tender abdominal exam.    Plan  - Continue IV diuresis       ROC (acute kidney injury)  ROC is likely related to cardiorenal syndrome and is responsive to IV diuresis. Patient Cr has been resolving with 1.4 from 1.6.     Plan  - Continue IV diuresis      Generalized anxiety disorder  - continue home Xanax 0.25 mg BID         VTE Risk Mitigation (From admission, onward)         Ordered     enoxaparin injection 40 mg  Every 24 hours      10/25/20 2229     IP VTE HIGH RISK PATIENT  Once      10/25/20 2218     Place sequential compression device  Until discontinued      10/25/20 2218                Discharge Planning   PACO: 10/29/2020     Code Status: Full Code   Is the patient medically ready for discharge?:     Reason for patient still in hospital (select all that apply): Patient trending condition, Laboratory test, Treatment and Consult recommendations  Discharge Plan A: Home                  Aris Ureña MD  Department of Hospital Medicine   [facility] [address] [facility]

## 2020-10-27 NOTE — PLAN OF CARE
10/27/20 0856   Post-Acute Status   Post-Acute Authorization Other   Other Status No Post-Acute Service Needs

## 2020-10-27 NOTE — PLAN OF CARE
10/27/20 1333   Post-Acute Status   Post-Acute Authorization Other   Other Status No Post-Acute Service Needs

## 2020-10-27 NOTE — ASSESSMENT & PLAN NOTE
Patient continues to have elevated with AST/ALT 60/76 and is likely associated with congestive hepatopathy. Patient has some tender abdominal exam.    Plan  - Continue IV diuresis

## 2020-10-27 NOTE — PROGRESS NOTES
[facility]  Heart Transplant  Progress Note    Patient Name: Kevan Queen  MRN: [id number]  Admission Date: 10/25/2020  Hospital Length of Stay: 2 days  Attending Physician: Otoniel Waller MD  Primary Care Provider: To Obtain Unable  Principal Problem:Acute on chronic systolic congestive heart failure    Subjective:     Interval History:  No events overnight.  He is net negative 750 mL.  Still has volume on exam.  .    Continuous Infusions:   furosemide (LASIX) 10 mg/mL infusion (non-titrating) 10 mg/hr (10/26/20 1545)     Scheduled Meds:   enoxaparin  40 mg Subcutaneous Q24H    metoprolol succinate  12.5 mg Oral Daily    spironolactone  25 mg Oral Daily     PRN Meds:acetaminophen, ALPRAZolam, hydrOXYzine HCL, ondansetron, polyethylene glycol, prochlorperazine, sodium chloride 0.9%    Review of patient's allergies indicates:  No Known Allergies  Objective:     Vital Signs (Most Recent):  Temp: 98.2 °F (36.8 °C) (10/27/20 0816)  Pulse: 102 (10/27/20 0816)  Resp: 18 (10/27/20 0816)  BP: 116/70 (10/27/20 0816)  SpO2: 97 % (10/27/20 0816) Vital Signs (24h Range):  Temp:  [97.4 °F (36.3 °C)-98.4 °F (36.9 °C)] 98.2 °F (36.8 °C)  Pulse:  [] 102  Resp:  [16-20] 18  SpO2:  [93 %-99 %] 97 %  BP: (104-116)/(68-72) 116/70     Patient Vitals for the past 72 hrs (Last 3 readings):   Weight   10/26/20 0400 78.4 kg (172 lb 13.5 oz)   10/25/20 2101 78.4 kg (172 lb 13.5 oz)   10/25/20 1820 73 kg (161 lb)     Body mass index is 21.6 kg/m².      Intake/Output Summary (Last 24 hours) at 10/27/2020 0925  Last data filed at 10/27/2020 0800  Gross per 24 hour   Intake 900 ml   Output 1310 ml   Net -410 ml       Hemodynamic Parameters:       Telemetry: ST    Physical Exam  Constitutional:       Appearance: Normal appearance.   HENT:      Head: Normocephalic and atraumatic.      Nose: Nose normal.      Mouth/Throat:      Mouth: Mucous membranes are moist.   Eyes:      Extraocular Movements: Extraocular movements  intact.      Pupils: Pupils are equal, round, and reactive to light.   Neck:      Musculoskeletal: Normal range of motion and neck supple.      Comments: JVP at 16 cm water  Cardiovascular:      Rate and Rhythm: Regular rhythm. Tachycardia present.      Heart sounds: Gallop present.    Pulmonary:      Effort: Pulmonary effort is normal.      Breath sounds: Wheezing present.   Abdominal:      General: Abdomen is flat. Bowel sounds are normal. There is distension.      Palpations: Abdomen is soft.      Tenderness: There is abdominal tenderness.      Comments: Tender hepatomegaly present     Musculoskeletal: Normal range of motion.   Skin:     General: Skin is warm.      Capillary Refill: Capillary refill takes less than 2 seconds.   Neurological:      General: No focal deficit present.      Mental Status: He is alert and oriented to person, place, and time.         Significant Labs:  CBC:  Recent Labs   Lab 10/25/20  1900 10/26/20  0426 10/27/20  0410   WBC 10.91 13.44* 10.34   RBC 4.31* 4.00* 3.99*   HGB 12.3* 11.3* 11.6*   HCT 39.0* 36.3* 34.4*    290 278   MCV 91 91 86   MCH 28.5 28.3 29.1   MCHC 31.5* 31.1* 33.7     BNP:  Recent Labs   Lab 10/25/20  1900   BNP 1,278*     CMP:  Recent Labs   Lab 10/25/20  1900 10/26/20  0426 10/27/20  0410   * 111* 115*   CALCIUM 9.5 9.4 9.5   ALBUMIN 3.3* 3.1* 3.3*   PROT 6.9 6.6 7.0    140 142   K 4.9 4.4 3.5   CO2 18* 24 29    104 99   BUN 23* 27* 25*   CREATININE 1.3 1.6* 1.4   ALKPHOS 53* 56 61   ALT 67* 63* 76*   AST 52* 50* 60*   BILITOT 0.7 0.9 1.0      Coagulation:   No results for input(s): PT, INR, APTT in the last 168 hours.  LDH:  No results for input(s): LDH in the last 72 hours.  Microbiology:  Microbiology Results (last 7 days)     ** No results found for the last 168 hours. **          I have reviewed all pertinent labs within the past 24 hours.    Estimated Creatinine Clearance: 81.7 mL/min (based on SCr of 1.4 mg/dL).    Diagnostic  Results:  I have reviewed and interpreted all pertinent imaging results/findings within the past 24 hours.    Assessment and Plan:     35-year-old male with past medical history of polysubstance abuse, CHF with EF of 10-15% presented with shortness of breath and epigastric abdominal pain.  He was discharged from [facility] yesterday and drove straight back here.  Going back he presented to [facility]   3 months back for shortness of breath and epigastric abdominal pain and was diagnosed with pneumonia.  He had 1 more visit where they diagnosed him with bronchitis.  He went to [facility] 2 weeks back problems and he was diagnosed with CHF with EF of 10-15% .  he also underwent left heart catheterization that showed clean coronaries.  He has history of polysubstance abuse where he takes marijuana and ecstasy daily.  Also smokes 1 pack /day until 3 months back and he has 16 pack-year smoking history . He smoked marijuana yesterday after he was discharged from [facility].  Currently complains of having orthopnea, epigastric abdominal pain.  Also complains of having shortness of breath whenever he exerts himself.  Denies having any nausea vomiting chest pain, palpitations.  His father also had history of cardiomyopathy and underwent heart transplant and he  last year due to heart failure.     * Acute on chronic systolic congestive heart failure  Nonischemic cardiomyopathy with EF of 10-15%.   Echo showed   He looks warm and wet  Left heart catheterization showed clean coronaries according to the patient.  Please request for left heart catheterization from [facility]  He is still volume up on exam. His JVP is at 16 cm of water. Recommend diuresing with Lasix at 20 mg/hr instead of 10 mg /hr. Target Net negative 2 lit     ferritin, TSH WNL . Pendign HIV and Hepatitis panel .   GDMT-  On Toprol and aldactone. Recommend starting patient on  Lisinopril 5 mg daily    Currently He will not be candidate for any advance options because of his polysubstance abuse    Transaminitis  Elevated transaminitis due to congestive hepatopathy.  Continue to monitor with diuresis    Anemia of chronic disease  Normocytic normochromic anemia .    vitamin B12, folate , Ferritin Levels WNL    ROC (acute kidney injury)  Cardiorenal.  Continue to monitor with diuresis.        Joaquín Guajardo MD  Heart Transplant  [facility]

## 2020-10-27 NOTE — ASSESSMENT & PLAN NOTE
ROC is likely related to cardiorenal syndrome and is responsive to IV diuresis. Patient Cr has been resolving with 1.4 from 1.6.     Plan  - Continue IV diuresis

## 2020-10-27 NOTE — ASSESSMENT & PLAN NOTE
"Patient with newly diagnosed cardiomyopathy and HFpEF(EF 10%; wearing life vest). Presenting with Acute Heart Failure Exacerbation, c/o abd pain and SOB. On initial exam patient with 's, SBP 90s, and SpO2 100% when O2 turned off. Patient had JVD present and bibasilar rales. Peripheral edema was NOT present in his LEs. BNP 1278, Trop 0.132, AST/ALT 52/67, ALP 53. CXR w/ "no acute findings." Bibasilar rales has resolved and still no signs of peripheral edema. Patient improving with aggressive diuresis as patient has UOP of 9L since admission. TTE shows severe left atrial enlargement, moderate to severe MR, grade 2 diastolic dysfunction and EF 10%. Encourage patient to wear lifevest and avoid polysubstance abuse.     Plan  - Consult Heart Transplant Service; Appreciate Assistance   - Dimoisése w/ Lasix 80 BID   - 2L fluid restriction   - Stirct I/Os  - Daily Standing weights   - Daily CMP/Mg and phos   - Possible daily potassium replacement  - Metoprolol 25 mg daily, lisinopril 2.5 BID, spironolactone 25 mg daily    - Holding coreg 2/2 SBP in the 90s at time of exam       "

## 2020-10-27 NOTE — PLAN OF CARE
AAOx4. No falls today. VS stable. No complaints of pain. Pt has a lasix drip going at 1 mL/hr. Will continue to monitor.

## 2020-10-27 NOTE — SUBJECTIVE & OBJECTIVE
Interval History: NAEON. Patient reports feeling better since diuresis but is complaining of associated generalized muscle cramps. Patient denies any constitutional, cardiac, respiratory, GI,  complaints currently.      Review of Systems   Constitutional: Negative for activity change, chills, fatigue and fever.   HENT: Negative for congestion, rhinorrhea, sore throat and trouble swallowing.    Eyes: Negative for photophobia and visual disturbance.   Respiratory: Negative for cough, chest tightness, shortness of breath and wheezing.    Cardiovascular: Negative for chest pain, palpitations and leg swelling.   Gastrointestinal: Negative for abdominal distention, diarrhea, nausea and vomiting.   Genitourinary: Negative for difficulty urinating, dysuria, frequency and hematuria.   Musculoskeletal: Positive for myalgias. Negative for arthralgias.   Skin: Negative for rash and wound.   Neurological: Negative for dizziness, syncope, weakness and light-headedness.   Psychiatric/Behavioral: Negative for agitation, behavioral problems and confusion. The patient is not nervous/anxious.      Objective:     Vital Signs (Most Recent):  Temp: 98.4 °F (36.9 °C) (10/27/20 1232)  Pulse: 106 (10/27/20 1232)  Resp: 20 (10/27/20 1232)  BP: 110/66 (10/27/20 1232)  SpO2: 97 % (10/27/20 1232) Vital Signs (24h Range):  Temp:  [97.7 °F (36.5 °C)-98.4 °F (36.9 °C)] 98.4 °F (36.9 °C)  Pulse:  [] 106  Resp:  [18-20] 20  SpO2:  [93 %-99 %] 97 %  BP: (104-116)/(66-72) 110/66     Weight: 78.4 kg (172 lb 13.5 oz)  Body mass index is 21.6 kg/m².    Intake/Output Summary (Last 24 hours) at 10/27/2020 1410  Last data filed at 10/27/2020 0800  Gross per 24 hour   Intake 540 ml   Output 1310 ml   Net -770 ml      Physical Exam  Vitals signs and nursing note reviewed.   Constitutional:       Appearance: Normal appearance.   HENT:      Head: Normocephalic and atraumatic.      Nose: Nose normal.      Mouth/Throat:      Mouth: Mucous membranes are  moist.   Eyes:      Extraocular Movements: Extraocular movements intact.      Pupils: Pupils are equal, round, and reactive to light.   Neck:      Musculoskeletal: Normal range of motion and neck supple.      Comments: JVP mildly elevated  Cardiovascular:      Rate and Rhythm: Regular rhythm. Tachycardia present.      Pulses: Normal pulses.      Heart sounds: No murmur. Gallop present.    Pulmonary:      Effort: Pulmonary effort is normal. No respiratory distress.      Breath sounds: No wheezing.   Abdominal:      General: Abdomen is flat. Bowel sounds are normal. There is no distension.      Palpations: Abdomen is soft.      Tenderness: There is no abdominal tenderness.   Musculoskeletal: Normal range of motion.   Skin:     General: Skin is warm.      Capillary Refill: Capillary refill takes less than 2 seconds.   Neurological:      General: No focal deficit present.      Mental Status: He is alert and oriented to person, place, and time.   Psychiatric:         Mood and Affect: Mood normal.         Behavior: Behavior normal.         Significant Labs:   CBC:   Recent Labs   Lab 10/25/20  1900 10/26/20  0426 10/27/20  0410   WBC 10.91 13.44* 10.34   HGB 12.3* 11.3* 11.6*   HCT 39.0* 36.3* 34.4*    290 278     CMP:   Recent Labs   Lab 10/25/20  1900 10/26/20  0426 10/27/20  0410    140 142   K 4.9 4.4 3.5    104 99   CO2 18* 24 29   * 111* 115*   BUN 23* 27* 25*   CREATININE 1.3 1.6* 1.4   CALCIUM 9.5 9.4 9.5   PROT 6.9 6.6 7.0   ALBUMIN 3.3* 3.1* 3.3*   BILITOT 0.7 0.9 1.0   ALKPHOS 53* 56 61   AST 52* 50* 60*   ALT 67* 63* 76*   ANIONGAP 12 12 14   EGFRNONAA >60.0 55.0* >60.0     Magnesium:   Recent Labs   Lab 10/25/20  1900 10/26/20  0426 10/27/20  0410   MG 1.9 1.8 1.6     TSH:   Recent Labs   Lab 10/27/20  0410   TSH 4.092*       Significant Imaging: I have reviewed all pertinent imaging results/findings within the past 24 hours.

## 2020-10-27 NOTE — PT/OT/SLP EVAL
"Occupational Therapy   Evaluation and Discharge Note    Name: Kevan Queen  MRN: [id number]  Admitting Diagnosis:  Acute on chronic systolic congestive heart failure      Recommendations:     Discharge Recommendations: home  Discharge Equipment Recommendations:  none  Barriers to discharge:  None    Assessment:     Kevan Queen is a 35 y.o. male with a medical diagnosis of Acute on chronic systolic congestive heart failure. At this time, patient is functioning at their prior level of function and does not require further acute OT services.     Plan:     During this hospitalization, patient does not require further acute OT services.  Please re-consult if situation changes.    · Plan of Care Reviewed with: patient, spouse, mother    Subjective     Chief Complaint: None stated. Patient dining vigorously w/ Spouse at bedside and Mother present  Patient/Family Comments/goals:Home w/ Family  Occupational Profile:  Living Environment: (I) ADLs, , drives, trained Certified Nurses Assistant. Patient states currently unemployed however.   Previous level of function: Patient drove self to hospital 2* S.O.B.   Equipment Used at home:  none  Assistance upon Discharge: Mother, Spouse.     Pain/Comfort:  · Pain Rating 1: 0/10    Patients cultural, spiritual, Voodoo conflicts given the current situation:  No    Objective:     Communicated with:RN prior to session.  Patient found Seated upright casually dialoging w/ Mother and Spouse upon OT entry to room.    General Precautions: Standard, fall   Orthopedic Precautions: NA   Braces: N/A     Occupational Performance:    Bed Mobility:  (I)   ·     Functional Mobility/Transfers: Patient accessing in room ADL environment w/ distant (S) of Spouse and Mother. Mother stating pleasantly "I am here for him if he needs anything."   · Functional Mobility: Mod (I)    Activities of Daily Living:  · Feeding:  independence    · Grooming: independence in stand at basin  · Upper Body " "Dressing: modified independence 2* gown vs street clothing  · Lower Body Dressing: modified independence seated OOB 2* hospital attire vs street clothing  · Toileting: Patient politely declned offer, assist or obs stating "I just went. I've been getting up and moving all over. I am taking care of things myself. I don't need any help.'  unable to formally assess. Patient poitely declining services at present. 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 Able to make complex needs known.       Physical Exam: Appears well, cheerful, no postural abnormalities, BUE AROM WNL, BUE MMT 4/5 w/ inclusion of gross .  BUE gross/fine motor coordination WNL      AMPAC 6 Click ADL:  AMPAC Total Score: 24    Treatment & Education:  SKILLED EDUCATION WAS PROVIDED TO PATIENT FOR:    *Patient education provided re: role of OT, and OTreatment rationales / protocols  *Importance of OOB activities daily in conjunction w/ edu related to importance of performing BUE AROM exercises even while in bed.  *Patient cheerfully agreeable to therapy session only.   *Lights turned on / shade open for session   *Basic self-care tasks and rudimentary functional mobility undertaken -- assist levels noted above.  *General in room safety and (S)'d mobility advised, Call button use reviewed  *Communication board up to date, questions/concerns within OT scope addressed  *Patient further engaged in there ex to BUE / BLE  x 1 set 10 reps while seated for AROM seated all joints/all planes as indicated for recovery of effective respiration in functional tasks, and to bolster proper circulation.    Education:    Patient left seated fully upright unsupported in bed (long sit) to complete dining.  with all lines intact, call button in reach, RN notified and Mother and Spouse  present    GOALS:   Multidisciplinary Problems     Occupational Therapy Goals     Not on file                History:     Past Medical " History:   Diagnosis Date    CHF (congestive heart failure) 10/01/2020       History reviewed. No pertinent surgical history.    Time Tracking:     OT Date of Treatment: 10/27/20  OT Start Time: 0935  OT Stop Time: 1001  OT Total Time (min): 26 min    Billable Minutes:Evaluation 16  Therapeutic Exercise 10    SALAZAR Levy  10/27/2020

## 2020-10-27 NOTE — PLAN OF CARE
Problem: Adjustment to Illness (Heart Failure)  Goal: Optimal Coping  Outcome: Ongoing, Progressing     Problem: Arrhythmia/Dysrhythmia (Heart Failure)  Goal: Stable Heart Rate and Rhythm  Outcome: Ongoing, Progressing     Problem: Cardiac Output Decreased (Heart Failure)  Goal: Optimal Cardiac Output  Outcome: Ongoing, Progressing   Pt has been kept free from falls and injury, bed in the low and locked position. Call light kept in easy reach, reminded pt of the fluid restriction and he stated his understanding.

## 2020-10-27 NOTE — SUBJECTIVE & OBJECTIVE
Interval History:  No events overnight.  He is net negative 750 mL.  Still has volume on exam.  .    Continuous Infusions:   furosemide (LASIX) 10 mg/mL infusion (non-titrating) 10 mg/hr (10/26/20 1545)     Scheduled Meds:   enoxaparin  40 mg Subcutaneous Q24H    metoprolol succinate  12.5 mg Oral Daily    spironolactone  25 mg Oral Daily     PRN Meds:acetaminophen, ALPRAZolam, hydrOXYzine HCL, ondansetron, polyethylene glycol, prochlorperazine, sodium chloride 0.9%    Review of patient's allergies indicates:  No Known Allergies  Objective:     Vital Signs (Most Recent):  Temp: 98.2 °F (36.8 °C) (10/27/20 0816)  Pulse: 102 (10/27/20 0816)  Resp: 18 (10/27/20 0816)  BP: 116/70 (10/27/20 0816)  SpO2: 97 % (10/27/20 0816) Vital Signs (24h Range):  Temp:  [97.4 °F (36.3 °C)-98.4 °F (36.9 °C)] 98.2 °F (36.8 °C)  Pulse:  [] 102  Resp:  [16-20] 18  SpO2:  [93 %-99 %] 97 %  BP: (104-116)/(68-72) 116/70     Patient Vitals for the past 72 hrs (Last 3 readings):   Weight   10/26/20 0400 78.4 kg (172 lb 13.5 oz)   10/25/20 2101 78.4 kg (172 lb 13.5 oz)   10/25/20 1820 73 kg (161 lb)     Body mass index is 21.6 kg/m².      Intake/Output Summary (Last 24 hours) at 10/27/2020 0925  Last data filed at 10/27/2020 0800  Gross per 24 hour   Intake 900 ml   Output 1310 ml   Net -410 ml       Hemodynamic Parameters:       Telemetry: ST    Physical Exam  Constitutional:       Appearance: Normal appearance.   HENT:      Head: Normocephalic and atraumatic.      Nose: Nose normal.      Mouth/Throat:      Mouth: Mucous membranes are moist.   Eyes:      Extraocular Movements: Extraocular movements intact.      Pupils: Pupils are equal, round, and reactive to light.   Neck:      Musculoskeletal: Normal range of motion and neck supple.      Comments: JVP at 16 cm water  Cardiovascular:      Rate and Rhythm: Regular rhythm. Tachycardia present.      Heart sounds: Gallop present.    Pulmonary:      Effort: Pulmonary effort is normal.       Breath sounds: Wheezing present.   Abdominal:      General: Abdomen is flat. Bowel sounds are normal. There is distension.      Palpations: Abdomen is soft.      Tenderness: There is abdominal tenderness.      Comments: Tender hepatomegaly present     Musculoskeletal: Normal range of motion.   Skin:     General: Skin is warm.      Capillary Refill: Capillary refill takes less than 2 seconds.   Neurological:      General: No focal deficit present.      Mental Status: He is alert and oriented to person, place, and time.         Significant Labs:  CBC:  Recent Labs   Lab 10/25/20  1900 10/26/20  0426 10/27/20  0410   WBC 10.91 13.44* 10.34   RBC 4.31* 4.00* 3.99*   HGB 12.3* 11.3* 11.6*   HCT 39.0* 36.3* 34.4*    290 278   MCV 91 91 86   MCH 28.5 28.3 29.1   MCHC 31.5* 31.1* 33.7     BNP:  Recent Labs   Lab 10/25/20  1900   BNP 1,278*     CMP:  Recent Labs   Lab 10/25/20  1900 10/26/20  0426 10/27/20  0410   * 111* 115*   CALCIUM 9.5 9.4 9.5   ALBUMIN 3.3* 3.1* 3.3*   PROT 6.9 6.6 7.0    140 142   K 4.9 4.4 3.5   CO2 18* 24 29    104 99   BUN 23* 27* 25*   CREATININE 1.3 1.6* 1.4   ALKPHOS 53* 56 61   ALT 67* 63* 76*   AST 52* 50* 60*   BILITOT 0.7 0.9 1.0      Coagulation:   No results for input(s): PT, INR, APTT in the last 168 hours.  LDH:  No results for input(s): LDH in the last 72 hours.  Microbiology:  Microbiology Results (last 7 days)     ** No results found for the last 168 hours. **          I have reviewed all pertinent labs within the past 24 hours.    Estimated Creatinine Clearance: 81.7 mL/min (based on SCr of 1.4 mg/dL).    Diagnostic Results:  I have reviewed and interpreted all pertinent imaging results/findings within the past 24 hours.

## 2020-10-27 NOTE — NURSING
Patient states that he emptied 4 urinals full of urine. That equals 4000 mL of urine. Urine output is not documented in note. Will continue to monitor patient.

## 2020-10-27 NOTE — ASSESSMENT & PLAN NOTE
Nonischemic cardiomyopathy with EF of 10-15%.   Echo showed   He looks warm and wet  Left heart catheterization showed clean coronaries according to the patient.  Please request for left heart catheterization from [facility]  He is still volume up on exam. His JVP is at 16 cm of water. Recommend diuresing with Lasix at 20 mg/hr instead of 10 mg /hr. Target Net negative 2 lit     ferritin, TSH WNL . Pendign HIV and Hepatitis panel .   GDMT-  On Toprol and aldactone. Recommend starting patient on  Lisinopril 5 mg daily   Currently He will not be candidate for any advance options because of his polysubstance abuse

## 2020-10-27 NOTE — PT/OT/SLP EVAL
Physical Therapy Evaluation and Discharge Note    Patient Name:  Kevan Queen   MRN:  [id number]    Recommendations:     Discharge Recommendations:  home   Discharge Equipment Recommendations: none   Barriers to discharge: None    Assessment:     Kevan Queen is a 35 y.o. male admitted with a medical diagnosis of Acute on chronic systolic congestive heart failure. Pt reports he is at his functional baseline. Pt denies the need of any further PT services or equipment for DC home. Pt denies any concerns regarding returning home. At this time, patient is functioning at their prior level of function and does not require further acute PT services.     Recent Surgery: * No surgery found *      Plan:     During this hospitalization, patient does not require further acute PT services.  Please re-consult if situation changes.      Subjective     Chief Complaint: none verbalized  Patient/Family Comments/goals: pt's travisancee reported that patient needed some assistance with showering PTA due to CHF exacerbation and being SOB, but is normally independent. Pt confirmed he showered independently at hospital.   Pain/Comfort:  · Pain Rating 1: 0/10  · Pain Rating Post-Intervention 1: 0/10    Patients cultural, spiritual, Episcopal conflicts given the current situation: no    Living Environment:  Pt lives with his mother and boris in a 2SH with all his needs on the first floor. 0STE the home and there is a tub/shower.  Prior to admission, patients level of function was Independent with ADLs and functional mobility.  Equipment used at home: none.  DME owned (not currently used): none.  Upon discharge, patient will have assistance from mother and boris.    Objective:     Communicated with RN prior to session.  Patient found HOB elevated with peripheral IV(live vest) upon PT entry to room.    General Precautions: Standard, fall   Orthopedic Precautions:N/A   Braces: N/A     Exams:  · Cognitive Exam:  Patient is oriented to Person,  Place, Time and Situation  · Fine Motor Coordination:  BLE, heel/shin, WFL  · Gross Motor Coordination:  WFL  · Postural Exam:  Patient presented with the following abnormalities:    · -       Rounded shoulders  · Sensation:  BLE, LT, WFL  · RLE ROM: WFL  · RLE Strength: WNL  · LLE ROM: WFL  · LLE Strength: WNL    Functional Mobility:  · Bed Mobility:     · Scooting: independence  · Supine to Sit: independence  · Sit to Supine: independence  · Transfers:     · Sit to Stand:  independence with no AD  · Gait: 40 ft, [place], no AD  · No LOBs or unsteadiness noted, no SOB noted  · Balance: [place] for all functional mobility    AM-PAC 6 CLICK MOBILITY  Total Score:24       Therapeutic Activities and Exercises:  Patient educated on role of therapy, goals of session, and benefits of mobilizing.   Discussed PT plan during hospitalization.   Patient educated on calling for assistance.   Patient educated on how their diagnosis impacts their mobility within PT scope of practice.   All questions answered within PT scope of practice.    Pt safe for mobilizing in room with nursing staff assistance for line management.      AM-PAC 6 CLICK MOBILITY  Total Score:24     Patient left HOB elevated with all lines intact, call button in reach, RN notified and pt's mother and pt's fiancee present.    GOALS:   Multidisciplinary Problems     Physical Therapy Goals     Not on file          Multidisciplinary Problems (Resolved)        Problem: Physical Therapy Goal    Goal Priority Disciplines Outcome Goal Variances Interventions   Physical Therapy Goal   (Resolved)     PT, PT/OT Met                     History:     Past Medical History:   Diagnosis Date    CHF (congestive heart failure) 10/01/2020       History reviewed. No pertinent surgical history.    Time Tracking:     PT Received On: 10/27/20  PT Start Time: 1549     PT Stop Time: 1554  PT Total Time (min): 5 min     Billable Minutes: Evaluation 5      Alma Vergara  PT  10/27/2020

## 2020-10-27 NOTE — PLAN OF CARE
PT eval complete. Pt independent and at functional baseline. No further PT needs at this time.    Alma Vergara, PT, DPT  10/27/2020      Problem: Physical Therapy Goal  Goal: Physical Therapy Goal  Outcome: Met

## 2020-10-28 PROBLEM — F12.20 CANNABIS USE DISORDER, SEVERE, DEPENDENCE: Chronic | Status: ACTIVE | Noted: 2020-10-28

## 2020-10-28 PROBLEM — Z01.818 ENCOUNTER FOR PRE-TRANSPLANT EVALUATION FOR HEART TRANSPLANT: Status: ACTIVE | Noted: 2020-10-28

## 2020-10-28 LAB
ALBUMIN SERPL BCP-MCNC: 3.3 G/DL (ref 3.5–5.2)
ALP SERPL-CCNC: 63 U/L (ref 55–135)
ALT SERPL W/O P-5'-P-CCNC: 74 U/L (ref 10–44)
ANION GAP SERPL CALC-SCNC: 15 MMOL/L (ref 8–16)
ANISOCYTOSIS BLD QL SMEAR: ABNORMAL
AST SERPL-CCNC: 50 U/L (ref 10–40)
BASO STIPL BLD QL SMEAR: ABNORMAL
BASOPHILS # BLD AUTO: 0.04 K/UL (ref 0–0.2)
BASOPHILS NFR BLD: 0.4 % (ref 0–1.9)
BILIRUB SERPL-MCNC: 0.6 MG/DL (ref 0.1–1)
BUN SERPL-MCNC: 31 MG/DL (ref 6–20)
BURR CELLS BLD QL SMEAR: ABNORMAL
CALCIUM SERPL-MCNC: 9.8 MG/DL (ref 8.7–10.5)
CHLORIDE SERPL-SCNC: 97 MMOL/L (ref 95–110)
CO2 SERPL-SCNC: 29 MMOL/L (ref 23–29)
CREAT SERPL-MCNC: 1.4 MG/DL (ref 0.5–1.4)
DIFFERENTIAL METHOD: ABNORMAL
EOSINOPHIL # BLD AUTO: 0.2 K/UL (ref 0–0.5)
EOSINOPHIL NFR BLD: 1.7 % (ref 0–8)
ERYTHROCYTE [DISTWIDTH] IN BLOOD BY AUTOMATED COUNT: 17.3 % (ref 11.5–14.5)
EST. GFR  (AFRICAN AMERICAN): >60 ML/MIN/1.73 M^2
EST. GFR  (NON AFRICAN AMERICAN): >60 ML/MIN/1.73 M^2
GIANT PLATELETS BLD QL SMEAR: PRESENT
GLUCOSE SERPL-MCNC: 117 MG/DL (ref 70–110)
HCT VFR BLD AUTO: 39.8 % (ref 40–54)
HGB BLD-MCNC: 13.1 G/DL (ref 14–18)
HYPOCHROMIA BLD QL SMEAR: ABNORMAL
IMM GRANULOCYTES # BLD AUTO: 0.05 K/UL (ref 0–0.04)
IMM GRANULOCYTES NFR BLD AUTO: 0.5 % (ref 0–0.5)
LYMPHOCYTES # BLD AUTO: 4.1 K/UL (ref 1–4.8)
LYMPHOCYTES NFR BLD: 37.5 % (ref 18–48)
MAGNESIUM SERPL-MCNC: 1.7 MG/DL (ref 1.6–2.6)
MCH RBC QN AUTO: 28.7 PG (ref 27–31)
MCHC RBC AUTO-ENTMCNC: 32.9 G/DL (ref 32–36)
MCV RBC AUTO: 87 FL (ref 82–98)
MONOCYTES # BLD AUTO: 1.1 K/UL (ref 0.3–1)
MONOCYTES NFR BLD: 9.9 % (ref 4–15)
NEUTROPHILS # BLD AUTO: 5.4 K/UL (ref 1.8–7.7)
NEUTROPHILS NFR BLD: 50 % (ref 38–73)
NRBC BLD-RTO: 0 /100 WBC
OVALOCYTES BLD QL SMEAR: ABNORMAL
PHOSPHATE SERPL-MCNC: 5.4 MG/DL (ref 2.7–4.5)
PLATELET # BLD AUTO: 276 K/UL (ref 150–350)
PLATELET BLD QL SMEAR: ABNORMAL
PMV BLD AUTO: 9.9 FL (ref 9.2–12.9)
POIKILOCYTOSIS BLD QL SMEAR: SLIGHT
POLYCHROMASIA BLD QL SMEAR: ABNORMAL
POTASSIUM SERPL-SCNC: 3.8 MMOL/L (ref 3.5–5.1)
PROT SERPL-MCNC: 7.4 G/DL (ref 6–8.4)
RBC # BLD AUTO: 4.57 M/UL (ref 4.6–6.2)
SODIUM SERPL-SCNC: 141 MMOL/L (ref 136–145)
TARGETS BLD QL SMEAR: ABNORMAL
WBC # BLD AUTO: 10.87 K/UL (ref 3.9–12.7)

## 2020-10-28 PROCEDURE — 25000003 PHARM REV CODE 250: Performed by: STUDENT IN AN ORGANIZED HEALTH CARE EDUCATION/TRAINING PROGRAM

## 2020-10-28 PROCEDURE — 99223 PR INITIAL HOSPITAL CARE,LEVL III: ICD-10-PCS | Mod: AF,HB,, | Performed by: PSYCHIATRY & NEUROLOGY

## 2020-10-28 PROCEDURE — 63600175 PHARM REV CODE 636 W HCPCS: Performed by: STUDENT IN AN ORGANIZED HEALTH CARE EDUCATION/TRAINING PROGRAM

## 2020-10-28 PROCEDURE — 99233 PR SUBSEQUENT HOSPITAL CARE,LEVL III: ICD-10-PCS | Mod: ,,, | Performed by: INTERNAL MEDICINE

## 2020-10-28 PROCEDURE — 99223 1ST HOSP IP/OBS HIGH 75: CPT | Mod: AF,HB,, | Performed by: PSYCHIATRY & NEUROLOGY

## 2020-10-28 PROCEDURE — 85025 COMPLETE CBC W/AUTO DIFF WBC: CPT

## 2020-10-28 PROCEDURE — 25000003 PHARM REV CODE 250: Performed by: INTERNAL MEDICINE

## 2020-10-28 PROCEDURE — 36415 COLL VENOUS BLD VENIPUNCTURE: CPT

## 2020-10-28 PROCEDURE — 83735 ASSAY OF MAGNESIUM: CPT

## 2020-10-28 PROCEDURE — 11000001 HC ACUTE MED/SURG PRIVATE ROOM

## 2020-10-28 PROCEDURE — 80053 COMPREHEN METABOLIC PANEL: CPT

## 2020-10-28 PROCEDURE — 99233 SBSQ HOSP IP/OBS HIGH 50: CPT | Mod: ,,, | Performed by: INTERNAL MEDICINE

## 2020-10-28 PROCEDURE — 84100 ASSAY OF PHOSPHORUS: CPT

## 2020-10-28 RX ORDER — POTASSIUM CHLORIDE 20 MEQ/1
40 TABLET, EXTENDED RELEASE ORAL ONCE
Status: COMPLETED | OUTPATIENT
Start: 2020-10-28 | End: 2020-10-28

## 2020-10-28 RX ORDER — HALOPERIDOL 5 MG/ML
INJECTION INTRAMUSCULAR
Status: DISPENSED
Start: 2020-10-28 | End: 2020-10-28

## 2020-10-28 RX ORDER — FUROSEMIDE 40 MG/1
40 TABLET ORAL 2 TIMES DAILY
Status: DISCONTINUED | OUTPATIENT
Start: 2020-10-28 | End: 2020-10-30 | Stop reason: HOSPADM

## 2020-10-28 RX ORDER — FERROUS SULFATE 325(65) MG
325 TABLET, DELAYED RELEASE (ENTERIC COATED) ORAL DAILY
Status: DISCONTINUED | OUTPATIENT
Start: 2020-10-28 | End: 2020-10-30 | Stop reason: HOSPADM

## 2020-10-28 RX ORDER — FUROSEMIDE 40 MG/1
40 TABLET ORAL 3 TIMES DAILY
Status: DISCONTINUED | OUTPATIENT
Start: 2020-10-28 | End: 2020-10-28

## 2020-10-28 RX ORDER — MAGNESIUM SULFATE HEPTAHYDRATE 40 MG/ML
2 INJECTION, SOLUTION INTRAVENOUS ONCE
Status: COMPLETED | OUTPATIENT
Start: 2020-10-28 | End: 2020-10-28

## 2020-10-28 RX ADMIN — MAGNESIUM SULFATE IN WATER 2 G: 40 INJECTION, SOLUTION INTRAVENOUS at 06:10

## 2020-10-28 RX ADMIN — CYCLOBENZAPRINE HYDROCHLORIDE 5 MG: 5 TABLET, FILM COATED ORAL at 05:10

## 2020-10-28 RX ADMIN — FUROSEMIDE 40 MG: 40 TABLET ORAL at 12:10

## 2020-10-28 RX ADMIN — POTASSIUM CHLORIDE 40 MEQ: 1500 TABLET, EXTENDED RELEASE ORAL at 08:10

## 2020-10-28 RX ADMIN — SPIRONOLACTONE 25 MG: 25 TABLET ORAL at 08:10

## 2020-10-28 RX ADMIN — ENOXAPARIN SODIUM 40 MG: 40 INJECTION SUBCUTANEOUS at 05:10

## 2020-10-28 RX ADMIN — METOPROLOL SUCCINATE 25 MG: 25 TABLET, EXTENDED RELEASE ORAL at 08:10

## 2020-10-28 RX ADMIN — FERROUS SULFATE TAB EC 325 MG (65 MG FE EQUIVALENT) 325 MG: 325 (65 FE) TABLET DELAYED RESPONSE at 08:10
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
## 2020-10-28 NOTE — MEDICAL/APP STUDENT
Progress Note  Hospital Medicine    Primary Team: [facility] [place] [facility]OSP MED 2  Admit Date: 10/25/2020   Length of Stay:  LOS: 3 days   SUBJECTIVE:   Reason for Admission:  Acute on chronic systolic congestive heart failure    HPI/Interval history:    34 yo man recently dx with cardiomyopathy and HFrEF (EF 10%), PMHx anxiety. First noticed difficulty breathing in Sept 2020, went to [facility] and was diagnosed with bronchitis then, two weeks later, with CAP. He presented with SOB at rest, PND, orthopnea, peripheral edema to knees during his most recent hospitalization at [facility], and was diagnosed with cardiomyopathy and HFpEF. At  he diuresed 10L and was discharged with a cardiac LifeVest. DCd with Lasix 20qd, carvedilol 3.125BID.      Presented to ED last night with abdo pain, N/V, SOB, and palpitations. No fevers, chills, diarrhea, chest pain, peripheral edema, coughing. No hx of alcohol, cocaine, meth use. Reported using 10-20 ecstasy tabs per day for the past 10 years. Had used ecstasy since age 18, but ceased while incarcerated from age [age]. 8PY smoking history with 0.5ppd.  His father had a hx of cardiomyopathy and heart transplant before passing last year due to complications from HF and transplant.      ED: BNP 1278, Trop WNL 0.132, AST/ALT 52/67, ALP 53. Presumptive positive THC and benzodiazepines. EKG No acute findings on CXR, but noticeable cardiomegaly, given 60mg IV lasix and admitted for acute/chronic HF exacerbation.      ON patient was hypotensive to 88-90 systolic from 4:30-5:30a. Toprol held after 3 doses and lasix held as well. Holding AM lisinopril dose.     Review of Systems:  Constitutional: no fever or chills  Respiratory: no cough or shortness of breath  Cardiovascular: no chest pain or palpitations  Gastrointestinal: positive for abdominal pain, no change in bowel habits.  Behavioral/Psych: no auditory or visual hallucinations     OBJECTIVE:     Temp:  [96.4 °F (35.8 °C)-98.4 °F  (36.9 °C)]   Pulse:  []   Resp:  [18-20]   BP: ()/(55-71)   SpO2:  [95 %-99 %]  Body mass index is 18.79 kg/m².  Intake/Outake:  This Shift:  No intake/output data recorded.    Net I/O past 24h:     Intake/Output Summary (Last 24 hours) at 10/28/2020 1135  Last data filed at 10/28/2020 0429  Gross per 24 hour   Intake 400 ml   Output 2925 ml   Net -2525 ml             Physical Exam:  General: lying in bed, conversational, appears older than stated age, no distress  Lungs: clear to auscultation bilaterally and normal respiratory effort  Cardiovascular: Tachycardic, S1, S2 normal, no murmur, click, rub or gallop. No JVD observed.  Extremities: Homans sign is negative, no sign of DVT.  Abdomen/Rectal: Abdomen: No tenderness to palpation. BS present and normal, no distension, no masses.  Psych/Behavioral:  Alert and oriented, appropriate affect.    Laboratory:  CBC/Anemia Labs: Coags:    Recent Labs   Lab 10/26/20  0426 10/26/20  0811 10/27/20  0410 10/28/20  0241   WBC 13.44*  --  10.34 10.87   HGB 11.3*  --  11.6* 13.1*   HCT 36.3*  --  34.4* 39.8*     --  278 276   MCV 91  --  86 87   RDW 17.6*  --  16.8* 17.3*   IRON  --   --  60  --    FERRITIN  --   --  215  --    FOLATE  --  9.4  --   --    [id number]  --  630  --   --     No results for input(s): PT, INR, APTT in the last 168 hours.     Chemistries:   Recent Labs   Lab 10/26/20  0426 10/27/20  0410 10/28/20  0241    142 141   K 4.4 3.5 3.8    99 97   CO2 24 29 29   BUN 27* 25* 31*   CREATININE 1.6* 1.4 1.4   CALCIUM 9.4 9.5 9.8   PROT 6.6 7.0 7.4   BILITOT 0.9 1.0 0.6   ALKPHOS 56 61 63   ALT 63* 76* 74*   AST 50* 60* 50*   MG 1.8 1.6 1.7   PHOS 4.6* 4.2 5.4*        Medications:  Scheduled Meds:   enoxaparin  40 mg Subcutaneous Q24H    ferrous sulfate  325 mg Oral Daily    furosemide  40 mg Oral TID    haloperidol lactate        metoprolol succinate  25 mg Oral Daily    spironolactone  25 mg Oral Daily                              Continuous Infusions:  PRN Meds:.acetaminophen, ALPRAZolam, cyclobenzaprine, hydrOXYzine HCL, ondansetron, pneumococcal vaccine, polyethylene glycol, prochlorperazine, sodium chloride 0.9%     ASSESSMENT/PLAN:     The patient is a 34yo man with acute on chronic systolic CHF exacerbation, ROC, Generalized anxiety disorder, anemia of chronic disease, transaminitis, and use of a LifeVest defibrillator.     Acute on chronic systolic CHF exacerbation  Acute systolic CHF exacerbation possibly 2/2 genetic cardiomyopathy or drug-induced  -Patient transitioned to Lasix 40mg po tid from Lasix 80 IV bid  -Metoprolol succinate (Toprol-XL) 25mg po qd  -Spironolactone 25mg po qd per transplant cardiology recs  -Lisinopril 2.5 mg po bid started per transplant cardiology recs. DC'd at this time due to hypotension  -Cardiac monitoring  -K replenished due to K of 3.8 today. 40mEq po  -Mg replenished due to Mg 1.7 today. 2g IV  -PT/OT  -O2 prn     ROC  -Monitor CMP  -Cardiorenal etiology  -Diuresis (see acute on chronic systolic CHF exacerbation)    Cardiomyopathy of undetermined type  -L heart cath showed clean coronary arteries  -Currently not a candidate for advance options due to polysubstance abuse     Generalized Anxiety Disorder  -alprazolam 0.25mg po bid prn  -hydroxyzine 50mg po tid prn      Anemia of chronic disease  -Monitor daily CBC  -Normocytic normochromic anemia     Transaminitis  -Monitor with daily CMP  -2/2 congestive hepatopathy  -Hep A,B,C negative     Uses LifeVest defibrillator  -Patient says battery is depleted. Patient's mother was going to return with a replacement battery this morning.    Ecstasy abuse  -Patient last used prior to admission to [facility]  -States that he has moved back to [place] in his mother's home, away from friends who use.  -Aware that his ecstasy could be contributing to cardiomyopathy, but feels that his ecstasy use helps raise his heart rate and alleviates his CHF  symptoms. Wants to stop using for transplant consideration and to live for his seven children.    Active Hospital Problems    Diagnosis  POA    *Acute on chronic systolic congestive heart failure [I50.23]  Yes    ROC (acute kidney injury) [N17.9]  Yes    Cardiomyopathy of undetermined type [I42.9]  Yes    Anemia of chronic disease [D63.8]  Yes    Uses LifeVest defibrillator [Z95.810]  Yes    Transaminitis [R74.01]  Yes    Ecstasy abuse [F16.10]  Yes    Generalized anxiety disorder [F41.1]  Yes      Resolved Hospital Problems   No resolved problems to display.     DVT ppx- Enoxaparin 40mg subQ q24h  CODE Status- Full    Dispo-Discharge 1 day pending continued CHF symptom improvement with diuresis    Aron Alvarez, MS-4  Hospital Medicine Team 2

## 2020-10-28 NOTE — ASSESSMENT & PLAN NOTE
Patient found to have hgb of 13 on this admission and has been stable since admission but is likely 2/2 advanced heart failure and possible cardiorenal association.    Plan  - Continue to monitor CBC   - Transfuse as necessary

## 2020-10-28 NOTE — PLAN OF CARE
Pt remains free from falls and injury. Pt makes no statement of pain. Ambulates within room. Pt with low BP this AM, Notified team, NNO's. Bed low and locked, Call light within reach.

## 2020-10-28 NOTE — SUBJECTIVE & OBJECTIVE
Interval History: NAEON. Patient seen this AM for BP reading of 88/60 which improved to 100/60 with no inventions. Patient was D/C off lasix gtt and morning lisinopril dose was held. Patient denies any constitutional, cardiac, respiratory, GI or  complaints at this time. Plan is for patient to continue PO lasix and assess weight/ UOP and continuing all other medications.     Review of Systems   Constitutional: Negative for activity change, chills, fatigue and fever.   HENT: Negative for congestion, rhinorrhea, sore throat and trouble swallowing.    Eyes: Negative for photophobia and visual disturbance.   Respiratory: Negative for cough, chest tightness, shortness of breath and wheezing.    Cardiovascular: Negative for chest pain, palpitations and leg swelling.   Gastrointestinal: Negative for abdominal distention, diarrhea, nausea and vomiting.   Genitourinary: Negative for difficulty urinating, dysuria, frequency and hematuria.   Musculoskeletal: Negative for arthralgias and myalgias.   Skin: Negative for rash and wound.   Neurological: Negative for dizziness, syncope, weakness and light-headedness.   Psychiatric/Behavioral: Negative for agitation, behavioral problems and confusion. The patient is not nervous/anxious.      Objective:     Vital Signs (Most Recent):  Temp: 97.8 °F (36.6 °C) (10/28/20 0710)  Pulse: 100 (10/28/20 1151)  Resp: 18 (10/28/20 0710)  BP: 105/69 (10/28/20 0710)  SpO2: 98 % (10/28/20 0710) Vital Signs (24h Range):  Temp:  [96.4 °F (35.8 °C)-98.2 °F (36.8 °C)] 97.8 °F (36.6 °C)  Pulse:  [] 100  Resp:  [18-20] 18  SpO2:  [95 %-99 %] 98 %  BP: ()/(55-71) 105/69     Weight: 68.2 kg (150 lb 5.7 oz)  Body mass index is 18.79 kg/m².    Intake/Output Summary (Last 24 hours) at 10/28/2020 1235  Last data filed at 10/28/2020 042  Gross per 24 hour   Intake 400 ml   Output 2925 ml   Net -2525 ml      Physical Exam  Vitals signs and nursing note reviewed.   Constitutional:        Appearance: Normal appearance.   HENT:      Head: Normocephalic and atraumatic.      Nose: Nose normal.      Mouth/Throat:      Mouth: Mucous membranes are moist.   Eyes:      Extraocular Movements: Extraocular movements intact.      Pupils: Pupils are equal, round, and reactive to light.   Neck:      Musculoskeletal: Normal range of motion and neck supple.      Comments: No JVD  Cardiovascular:      Rate and Rhythm: Regular rhythm. Tachycardia present.      Pulses: Normal pulses.      Heart sounds: No murmur. Gallop present.    Pulmonary:      Effort: Pulmonary effort is normal. No respiratory distress.      Breath sounds: No wheezing.   Abdominal:      General: Abdomen is flat. Bowel sounds are normal. There is no distension.      Palpations: Abdomen is soft.      Tenderness: There is no abdominal tenderness.   Musculoskeletal: Normal range of motion.   Skin:     General: Skin is warm and dry.      Capillary Refill: Capillary refill takes less than 2 seconds.   Neurological:      General: No focal deficit present.      Mental Status: He is alert and oriented to person, place, and time.   Psychiatric:         Mood and Affect: Mood normal.         Behavior: Behavior normal.         Significant Labs:   CBC:   Recent Labs   Lab 10/27/20  0410 10/28/20  0241   WBC 10.34 10.87   HGB 11.6* 13.1*   HCT 34.4* 39.8*    276     CMP:   Recent Labs   Lab 10/27/20  0410 10/28/20  0241    141   K 3.5 3.8   CL 99 97   CO2 29 29   * 117*   BUN 25* 31*   CREATININE 1.4 1.4   CALCIUM 9.5 9.8   PROT 7.0 7.4   ALBUMIN 3.3* 3.3*   BILITOT 1.0 0.6   ALKPHOS 61 63   AST 60* 50*   ALT 76* 74*   ANIONGAP 14 15   EGFRNONAA >60.0 >60.0       Significant Imaging: I have reviewed all pertinent imaging results/findings within the past 24 hours.

## 2020-10-28 NOTE — ASSESSMENT & PLAN NOTE
Patient reports 10 year history of using 10-20 ecstasy pills daily for which he states helps his heart failure symptoms. Patient reports recent lifestyle changes attributed to worsening symptoms and change in environment recently. Patient denies any hospitalizations due to ecstasy and reports little desire to restart use since diagnosis.    Plan  - Consult to addiction psych with appreciated recommendations

## 2020-10-28 NOTE — ASSESSMENT & PLAN NOTE
"Patient with newly diagnosed cardiomyopathy and HFpEF(EF 10%; wearing life vest). Presenting with Acute Heart Failure Exacerbation, c/o abd pain and SOB. On initial exam patient with 's, SBP 90s, and SpO2 100% when O2 turned off. Patient had JVD present and bibasilar rales. Peripheral edema was NOT present in his LEs. BNP 1278, Trop 0.132, AST/ALT 52/67, ALP 53. CXR w/ "no acute findings." Bibasilar rales has resolved and still no signs of peripheral edema. Patient improving with aggressive diuresis as patient has UOP of 9L since admission. TTE shows severe left atrial enlargement, moderate to severe MR, grade 2 diastolic dysfunction and EF 10%. Encourage patient to wear lifevest and avoid polysubstance abuse. Patient has had net output of 10L and down 10 kg in weight since admission.     Plan  - Consult Heart Transplant Service; Appreciate Assistance   - DC lasix gtt  - Diurese w/ Lasix 80 BID   - 2L fluid restriction   - Stirct I/Os  - Daily Standing weights   - Daily CMP/Mg and phos   - Possible daily potassium replacement  - Metoprolol 25 mg daily, lisinopril 2.5 BID, spironolactone 25 mg daily    - Holding coreg 2/2 SBP in the 90s at time of exam       "

## 2020-10-28 NOTE — ASSESSMENT & PLAN NOTE
ASSESSMENT:     DIAGNOSES & PROBLEMS    Ecstasy use disorder  Cannabis use disorder      STRENGTHS AND LIABILITIES   Strength: Patient accepts guidance/feedback, Strength: Patient is expressive/articulate., Strength: Patient is intelligent., Strength: Patient is motivated for change., Strength: Patient has positive support network., Strength: Patient has reasonable judgment., Liability: Patient has poor health.      MOTIVATION TO PURSUE RECOVERY: high    ACCEPTANCE OF ADDICTION: good    ABILITY TO ADHERE TO TREATMENT PLAN: moderate      PLAN:       TRANSPLANT SUITABILITY  From a psychiatric perspective, this patient presents as a High risk for transplant. Although patient has ceased use of ecstasy and expresses high motivation for maintaining sobreity, he is only 2 weeks removed from last use and thus still poses as High risk for relapse at this time. This risk may be modified by attending Residential or IOP rehab program along with AA/NA programs. Patient open to doing so, resources and counseling provided. Patient instructed to contact available facilities.      MANAGEMENT PLAN, TREATMENT GOALS, THERAPEUTIC TECHNIQUES/APPROACHES & CLINICAL REASONING      Patient reports having difficulty sleeping due to HF symptoms. Also reports Trial of xanax has not been very effective. Would prefer alternative to benzodiazepine to be used for assisting with sleep due to potential respiratory effects of sedative class.     - Recommend melatonin nightly as well as hydroxyzine 25-50mg nightly PRN insomnia.    No other recommendations at this time.           · Patient counseled on abstinence from alcohol and substances of abuse (illicit and prescription).  · Additional workup planned to address substance use disorder, in order to guide and refine ongoing management options, includes serial alcohol and drug laboratory testing (e.g. PETH, urine toxicology).  · Relapse prevention and motivational interviewing provided.  · Education  provided on 12 step recovery programs.      PRESCRIPTION DRUG MANAGEMENT  - The risks and benefits of medication were discussed with this patient.  - Possible expectable adverse effects of any current or proposed individual psychotropic agents were discussed with this patient.  - Counseling was provided on the importance of full compliance with medication regimens.      In cases of emergency, daily coverage provided by Acute/ER Psych MD, NP, or SW, with contact numbers located in [facility] On Call Schedule    Case discussed with staff addiction psychiatrist: LILLIE GANDHI MD    Thank you for allowing us to participate in this patient's care. Will sign off. Please contact us if you have any questions.     Nader Martinez MD  [facility], PGY-2  10/28/2020          Labs/Imaging/Studies:   Recent Results (from the past 24 hour(s))   CBC auto differential    Collection Time: 10/28/20  2:41 AM   Result Value Ref Range    WBC 10.87 3.90 - 12.70 K/uL    RBC 4.57 (L) 4.60 - 6.20 M/uL    Hemoglobin 13.1 (L) 14.0 - 18.0 g/dL    Hematocrit 39.8 (L) 40.0 - 54.0 %    MCV 87 82 - 98 fL    MCH 28.7 27.0 - 31.0 pg    MCHC 32.9 32.0 - 36.0 g/dL    RDW 17.3 (H) 11.5 - 14.5 %    Platelets 276 150 - 350 K/uL    MPV 9.9 9.2 - 12.9 fL    Immature Granulocytes 0.5 0.0 - 0.5 %    Gran # (ANC) 5.4 1.8 - 7.7 K/uL    Immature Grans (Abs) 0.05 (H) 0.00 - 0.04 K/uL    Lymph # 4.1 1.0 - 4.8 K/uL    Mono # 1.1 (H) 0.3 - 1.0 K/uL    Eos # 0.2 0.0 - 0.5 K/uL    Baso # 0.04 0.00 - 0.20 K/uL    nRBC 0 0 /100 WBC    Gran % 50.0 38.0 - 73.0 %    Lymph % 37.5 18.0 - 48.0 %    Mono % 9.9 4.0 - 15.0 %    Eosinophil % 1.7 0.0 - 8.0 %    Basophil % 0.4 0.0 - 1.9 %    Platelet Estimate Appears normal     Aniso Moderate     Poik Slight     Poly Occasional     Hypo Occasional     Ovalocytes Occasional     Target Cells Occasional     [place] Cells Occasional     Basophilic Stippling Occasional     Large/Giant Platelets Present      Differential Method Automated    Comprehensive metabolic panel - if not done in ED    Collection Time: 10/28/20  2:41 AM   Result Value Ref Range    Sodium 141 136 - 145 mmol/L    Potassium 3.8 3.5 - 5.1 mmol/L    Chloride 97 95 - 110 mmol/L    CO2 29 23 - 29 mmol/L    Glucose 117 (H) 70 - 110 mg/dL    BUN 31 (H) 6 - 20 mg/dL    Creatinine 1.4 0.5 - 1.4 mg/dL    Calcium 9.8 8.7 - 10.5 mg/dL    Total Protein 7.4 6.0 - 8.4 g/dL    Albumin 3.3 (L) 3.5 - 5.2 g/dL    Total Bilirubin 0.6 0.1 - 1.0 mg/dL    Alkaline Phosphatase 63 55 - 135 U/L    AST 50 (H) 10 - 40 U/L    ALT 74 (H) 10 - 44 U/L    Anion Gap 15 8 - 16 mmol/L    eGFR if African American >60.0 >60 mL/min/1.73 m^2    eGFR if non African American >60.0 >60 mL/min/1.73 m^2   Magnesium - if not done in ED    Collection Time: 10/28/20  2:41 AM   Result Value Ref Range    Magnesium 1.7 1.6 - 2.6 mg/dL   Phosphorus - if not done in ED    Collection Time: 10/28/20  2:41 AM   Result Value Ref Range    Phosphorus 5.4 (H) 2.7 - 4.5 mg/dL      Imaging Results          X-Ray Chest AP Portable (Final result)  Result time 10/25/20 20:33:43    Final result by Ricardo Alvarez MD (10/25/20 20:33:43)                 Impression:      No acute findings in the chest.    Cardiomegaly.      Electronically signed by: Ricardo Alvarez MD  Date:    10/25/2020  Time:    20:33             Narrative:    EXAMINATION:  XR CHEST AP PORTABLE    CLINICAL HISTORY:  Shortness of breath    TECHNIQUE:  Single frontal view of the chest was performed.    COMPARISON:  None    FINDINGS:  Telemetry wires overlie the chest.    No consolidation, pleural effusion or pneumothorax.    Cardiomediastinal silhouette is enlarged.

## 2020-10-28 NOTE — PROGRESS NOTES
"[facility] Hospital Medicine  Progress Note    Patient Name: Kevan Queen  MRN: [id number]  Patient Class: IP- Inpatient   Admission Date: 10/25/2020  Length of Stay: 3 days  Attending Physician: Otoniel Waller MD  Primary Care Provider: To Obtain Unable    [place]ospital Medicine [place] [facility] [place] Aris Ureña MD    Subjective:     Principal Problem:Acute on chronic systolic congestive heart failure        HPI:  Patient is a 35 y.o. man with recent diagnosis of Cardiomyopathy and HFrEF(EF 10%). He was recently discharged from [facility] two days prior to [place] [facility] [place]. He first noticed difficulty breathing September of 2020. When he first presented to the OSH he was diagnosed with bronchitis, and then two weeks later with CAP. It was during this most recent presentation with SOB at rest that he was diagnosed with cardiomyopathy and HFrEF. Prior to presenting to the OSH he was experiencing SOB at rest, PND, orthopnea, and peripheral edema to his knees. He was admitted at  for two weeks were he reports 10L of diuresis. Prior to discharge he was fitted with a life vest. Additionally he was discharged on coreg 3.125 BID and lasix 20 daily. He reports that he was compliant with his medication and denies any dietary indiscretion. He does report "feeling off" and uncomfortable when taking coreg. He returned to the ED today complaining of abd pain, N/V, SOB, and palpitations.   He denies fevers, chills, diarrhea, chest pain, peripheral edema, or coughing. Of note, the patient's father also had cardiomyopathy and received a heart transplant. He passed away a year ago due to complications from his heart failure and transplant. Patient denies alcohol, cocaine, or methamphetamine use.  In the ED labs were concerning with BNP 1278, Trop 0.132, AST/ALT 52/67, ALP 53. His CXR demonstrated "no acute findings." He was given 60 mg of IV lasix and admitted to Hospital Medicine for Acute on " Chronic Heart Failure Exacerbation.     Overview/Hospital Course:  Patient was admitted to hospital medicine for evaluation of advanced heart failure. On admission patient had signs of acute fluid overload and was given one time 80 mg of lasix with BNP 1278 with stable vital signs. Patient was sign by advanced heart failure/heart transplant who have given updated recommendations as well as evaluation for possible transplant. Patient currently not candidate due to polysubstance abuse. TTE performed on 10/26 showed severe left atrial enlargement, severe left ventricular eccentric hypertrophy. The left ventricle is severely enlarged with severely decreased systolic function. The estimated ejection fraction is 10%. There is severe left ventricular global hypokinesis. Grade II diastolic dysfunction. Mild right ventricular enlargement.Moderately reduced right ventricular systolic function.Moderate-to-severe mitral regurgitation. Patient started on toprol 25mg, lisinopril 2.5 mg BID, and IV diuresis.     Interval History: NAEON. Patient seen this AM for BP reading of 88/60 which improved to 100/60 with no inventions. Patient was D/C off lasix gtt and morning lisinopril dose was held. Patient denies any constitutional, cardiac, respiratory, GI or  complaints at this time. Plan is for patient to continue PO lasix and assess weight/ UOP and continuing all other medications.     Review of Systems   Constitutional: Negative for activity change, chills, fatigue and fever.   HENT: Negative for congestion, rhinorrhea, sore throat and trouble swallowing.    Eyes: Negative for photophobia and visual disturbance.   Respiratory: Negative for cough, chest tightness, shortness of breath and wheezing.    Cardiovascular: Negative for chest pain, palpitations and leg swelling.   Gastrointestinal: Negative for abdominal distention, diarrhea, nausea and vomiting.   Genitourinary: Negative for difficulty urinating, dysuria, frequency and  hematuria.   Musculoskeletal: Negative for arthralgias and myalgias.   Skin: Negative for rash and wound.   Neurological: Negative for dizziness, syncope, weakness and light-headedness.   Psychiatric/Behavioral: Negative for agitation, behavioral problems and confusion. The patient is not nervous/anxious.      Objective:     Vital Signs (Most Recent):  Temp: 97.8 °F (36.6 °C) (10/28/20 0710)  Pulse: 100 (10/28/20 1151)  Resp: 18 (10/28/20 0710)  BP: 105/69 (10/28/20 0710)  SpO2: 98 % (10/28/20 0710) Vital Signs (24h Range):  Temp:  [96.4 °F (35.8 °C)-98.2 °F (36.8 °C)] 97.8 °F (36.6 °C)  Pulse:  [] 100  Resp:  [18-20] 18  SpO2:  [95 %-99 %] 98 %  BP: ()/(55-71) 105/69     Weight: 68.2 kg (150 lb 5.7 oz)  Body mass index is 18.79 kg/m².    Intake/Output Summary (Last 24 hours) at 10/28/2020 1235  Last data filed at 10/28/2020 0429  Gross per 24 hour   Intake 400 ml   Output 2925 ml   Net -2525 ml      Physical Exam  Vitals signs and nursing note reviewed.   Constitutional:       Appearance: Normal appearance.   HENT:      Head: Normocephalic and atraumatic.      Nose: Nose normal.      Mouth/Throat:      Mouth: Mucous membranes are moist.   Eyes:      Extraocular Movements: Extraocular movements intact.      Pupils: Pupils are equal, round, and reactive to light.   Neck:      Musculoskeletal: Normal range of motion and neck supple.      Comments: No JVD  Cardiovascular:      Rate and Rhythm: Regular rhythm. Tachycardia present.      Pulses: Normal pulses.      Heart sounds: No murmur. Gallop present.    Pulmonary:      Effort: Pulmonary effort is normal. No respiratory distress.      Breath sounds: No wheezing.   Abdominal:      General: Abdomen is flat. Bowel sounds are normal. There is no distension.      Palpations: Abdomen is soft.      Tenderness: There is no abdominal tenderness.   Musculoskeletal: Normal range of motion.   Skin:     General: Skin is warm and dry.      Capillary Refill: Capillary  "refill takes less than 2 seconds.   Neurological:      General: No focal deficit present.      Mental Status: He is alert and oriented to person, place, and time.   Psychiatric:         Mood and Affect: Mood normal.         Behavior: Behavior normal.         Significant Labs:   CBC:   Recent Labs   Lab 10/27/20  0410 10/28/20  0241   WBC 10.34 10.87   HGB 11.6* 13.1*   HCT 34.4* 39.8*    276     CMP:   Recent Labs   Lab 10/27/20  0410 10/28/20  0241    141   K 3.5 3.8   CL 99 97   CO2 29 29   * 117*   BUN 25* 31*   CREATININE 1.4 1.4   CALCIUM 9.5 9.8   PROT 7.0 7.4   ALBUMIN 3.3* 3.3*   BILITOT 1.0 0.6   ALKPHOS 61 63   AST 60* 50*   ALT 76* 74*   ANIONGAP 14 15   EGFRNONAA >60.0 >60.0       Significant Imaging: I have reviewed all pertinent imaging results/findings within the past 24 hours.      Assessment/Plan:      * Acute on chronic systolic congestive heart failure  Patient with newly diagnosed cardiomyopathy and HFpEF(EF 10%; wearing life vest). Presenting with Acute Heart Failure Exacerbation, c/o abd pain and SOB. On initial exam patient with 's, SBP 90s, and SpO2 100% when O2 turned off. Patient had JVD present and bibasilar rales. Peripheral edema was NOT present in his LEs. BNP 1278, Trop 0.132, AST/ALT 52/67, ALP 53. CXR w/ "no acute findings." Bibasilar rales has resolved and still no signs of peripheral edema. Patient improving with aggressive diuresis as patient has UOP of 9L since admission. TTE shows severe left atrial enlargement, moderate to severe MR, grade 2 diastolic dysfunction and EF 10%. Encourage patient to wear lifevest and avoid polysubstance abuse. Patient has had net output of 10L and down 10 kg in weight since admission.     Plan  - Consult Heart Transplant Service; Appreciate Assistance   - DC lasix gtt  - Diurese w/ Lasix 80 BID   - 2L fluid restriction   - Stirct I/Os  - Daily Standing weights   - Daily CMP/Mg and phos   - Possible daily potassium " replacement  - Metoprolol 25 mg daily, lisinopril 2.5 BID, spironolactone 25 mg daily    - Holding coreg 2/2 SBP in the 90s at time of exam         Ecstasy abuse  Patient reports 10 year history of using 10-20 ecstasy pills daily for which he states helps his heart failure symptoms. Patient reports recent lifestyle changes attributed to worsening symptoms and change in environment recently. Patient denies any hospitalizations due to ecstasy and reports little desire to restart use since diagnosis.    Plan  - Consult to addiction psych with appreciated recommendations      Transaminitis  Patient continues to have elevated with AST/ALT 60/76 and is likely associated with congestive hepatopathy. Patient has some tender abdominal exam.    Plan  - Continue IV diuresis       Uses LifeVest defibrillator  Patient reports intermittent use of LifeVest defibrillator since receiving it on discharge from . Patient was encouraged to wear vest regularly.      Anemia of chronic disease  Patient found to have hgb of 13 on this admission and has been stable since admission but is likely 2/2 advanced heart failure and possible cardiorenal association.    Plan  - Continue to monitor CBC   - Transfuse as necessary     ROC (acute kidney injury)  ROC is likely related to cardiorenal syndrome and is responsive to IV diuresis. Patient Cr has been resolving with 1.4 from 1.6.     Plan  - Continue IV diuresis      Generalized anxiety disorder  - continue home Xanax 0.25 mg BID         VTE Risk Mitigation (From admission, onward)         Ordered     enoxaparin injection 40 mg  Every 24 hours      10/25/20 2229     IP VTE HIGH RISK PATIENT  Once      10/25/20 2218     Place sequential compression device  Until discontinued      10/25/20 2218                Discharge Planning   PACO: 10/29/2020     Code Status: Full Code   Is the patient medically ready for discharge?:     Reason for patient still in hospital (select all that apply): Patient  trending condition, Consult recommendations and Pending disposition  Discharge Plan A: Home                  Aris Ureña MD  Department of Hospital Medicine   [facility]

## 2020-10-28 NOTE — PLAN OF CARE
10/28/20 0843   Post-Acute Status   Post-Acute Authorization Other   Other Status No Post-Acute Service Needs

## 2020-10-28 NOTE — ASSESSMENT & PLAN NOTE
Patient reports intermittent use of LifeVest defibrillator since receiving it on discharge from . Patient was encouraged to wear vest regularly.
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
## 2020-10-29 LAB
ALBUMIN SERPL BCP-MCNC: 3.2 G/DL (ref 3.5–5.2)
ALP SERPL-CCNC: 59 U/L (ref 55–135)
ALT SERPL W/O P-5'-P-CCNC: 56 U/L (ref 10–44)
ANION GAP SERPL CALC-SCNC: 11 MMOL/L (ref 8–16)
AST SERPL-CCNC: 32 U/L (ref 10–40)
BASOPHILS # BLD AUTO: 0.04 K/UL (ref 0–0.2)
BASOPHILS NFR BLD: 0.4 % (ref 0–1.9)
BILIRUB SERPL-MCNC: 0.5 MG/DL (ref 0.1–1)
BUN SERPL-MCNC: 39 MG/DL (ref 6–20)
CALCIUM SERPL-MCNC: 9.6 MG/DL (ref 8.7–10.5)
CHLORIDE SERPL-SCNC: 100 MMOL/L (ref 95–110)
CO2 SERPL-SCNC: 28 MMOL/L (ref 23–29)
CREAT SERPL-MCNC: 1.5 MG/DL (ref 0.5–1.4)
DIFFERENTIAL METHOD: ABNORMAL
EOSINOPHIL # BLD AUTO: 0.2 K/UL (ref 0–0.5)
EOSINOPHIL NFR BLD: 1.5 % (ref 0–8)
ERYTHROCYTE [DISTWIDTH] IN BLOOD BY AUTOMATED COUNT: 17.6 % (ref 11.5–14.5)
EST. GFR  (AFRICAN AMERICAN): >60 ML/MIN/1.73 M^2
EST. GFR  (NON AFRICAN AMERICAN): 59.5 ML/MIN/1.73 M^2
GLUCOSE SERPL-MCNC: 136 MG/DL (ref 70–110)
HCT VFR BLD AUTO: 38.5 % (ref 40–54)
HGB BLD-MCNC: 12.5 G/DL (ref 14–18)
IMM GRANULOCYTES # BLD AUTO: 0.02 K/UL (ref 0–0.04)
IMM GRANULOCYTES NFR BLD AUTO: 0.2 % (ref 0–0.5)
LYMPHOCYTES # BLD AUTO: 3.9 K/UL (ref 1–4.8)
LYMPHOCYTES NFR BLD: 37.5 % (ref 18–48)
MAGNESIUM SERPL-MCNC: 2.2 MG/DL (ref 1.6–2.6)
MCH RBC QN AUTO: 28 PG (ref 27–31)
MCHC RBC AUTO-ENTMCNC: 32.5 G/DL (ref 32–36)
MCV RBC AUTO: 86 FL (ref 82–98)
MONOCYTES # BLD AUTO: 0.7 K/UL (ref 0.3–1)
MONOCYTES NFR BLD: 6.9 % (ref 4–15)
NEUTROPHILS # BLD AUTO: 5.6 K/UL (ref 1.8–7.7)
NEUTROPHILS NFR BLD: 53.5 % (ref 38–73)
NRBC BLD-RTO: 0 /100 WBC
PHOSPHATE SERPL-MCNC: 4.8 MG/DL (ref 2.7–4.5)
PLATELET # BLD AUTO: 339 K/UL (ref 150–350)
PMV BLD AUTO: 10.2 FL (ref 9.2–12.9)
POTASSIUM SERPL-SCNC: 4.3 MMOL/L (ref 3.5–5.1)
PROT SERPL-MCNC: 7 G/DL (ref 6–8.4)
PYRIDOXAL SERPL-MCNC: 14 UG/L (ref 5–50)
RBC # BLD AUTO: 4.46 M/UL (ref 4.6–6.2)
SODIUM SERPL-SCNC: 139 MMOL/L (ref 136–145)
WBC # BLD AUTO: 10.42 K/UL (ref 3.9–12.7)

## 2020-10-29 PROCEDURE — 99232 PR SUBSEQUENT HOSPITAL CARE,LEVL II: ICD-10-PCS | Mod: ,,, | Performed by: INTERNAL MEDICINE

## 2020-10-29 PROCEDURE — 84100 ASSAY OF PHOSPHORUS: CPT

## 2020-10-29 PROCEDURE — 25000003 PHARM REV CODE 250: Performed by: INTERNAL MEDICINE

## 2020-10-29 PROCEDURE — 63600175 PHARM REV CODE 636 W HCPCS: Performed by: STUDENT IN AN ORGANIZED HEALTH CARE EDUCATION/TRAINING PROGRAM

## 2020-10-29 PROCEDURE — 83735 ASSAY OF MAGNESIUM: CPT

## 2020-10-29 PROCEDURE — 11000001 HC ACUTE MED/SURG PRIVATE ROOM

## 2020-10-29 PROCEDURE — 25000003 PHARM REV CODE 250: Performed by: STUDENT IN AN ORGANIZED HEALTH CARE EDUCATION/TRAINING PROGRAM

## 2020-10-29 PROCEDURE — 85025 COMPLETE CBC W/AUTO DIFF WBC: CPT

## 2020-10-29 PROCEDURE — 36415 COLL VENOUS BLD VENIPUNCTURE: CPT

## 2020-10-29 PROCEDURE — 80053 COMPREHEN METABOLIC PANEL: CPT

## 2020-10-29 PROCEDURE — 99232 SBSQ HOSP IP/OBS MODERATE 35: CPT | Mod: ,,, | Performed by: INTERNAL MEDICINE

## 2020-10-29 RX ORDER — LISINOPRIL 2.5 MG/1
2.5 TABLET ORAL DAILY
Qty: 90 TABLET | Refills: 3 | Status: CANCELLED | OUTPATIENT
Start: 2020-10-29 | End: 2021-10-29

## 2020-10-29 RX ORDER — ALPRAZOLAM 0.25 MG/1
0.25 TABLET ORAL NIGHTLY PRN
Qty: 30 TABLET | Refills: 0 | Status: CANCELLED | OUTPATIENT
Start: 2020-10-29 | End: 2020-11-28

## 2020-10-29 RX ORDER — LISINOPRIL 2.5 MG/1
2.5 TABLET ORAL DAILY
Status: DISCONTINUED | OUTPATIENT
Start: 2020-10-29 | End: 2020-10-30 | Stop reason: HOSPADM

## 2020-10-29 RX ADMIN — SPIRONOLACTONE 25 MG: 25 TABLET ORAL at 09:10

## 2020-10-29 RX ADMIN — FERROUS SULFATE TAB EC 325 MG (65 MG FE EQUIVALENT) 325 MG: 325 (65 FE) TABLET DELAYED RESPONSE at 09:10

## 2020-10-29 RX ADMIN — LISINOPRIL 2.5 MG: 2.5 TABLET ORAL at 05:10

## 2020-10-29 RX ADMIN — FUROSEMIDE 40 MG: 40 TABLET ORAL at 05:10

## 2020-10-29 RX ADMIN — FUROSEMIDE 40 MG: 40 TABLET ORAL at 09:10

## 2020-10-29 RX ADMIN — ENOXAPARIN SODIUM 40 MG: 40 INJECTION SUBCUTANEOUS at 05:10

## 2020-10-29 RX ADMIN — METOPROLOL SUCCINATE 12.5 MG: 25 TABLET, EXTENDED RELEASE ORAL at 01:10

## 2020-10-29 NOTE — ASSESSMENT & PLAN NOTE
ROC is likely related to cardiorenal syndrome and is responsive to IV diuresis. Patient Cr has been resolving with 1.4 from 1.6. Patient has been diuresed and is now has increase in Cr to 1.5. Diuresed net 10 L since admission.    Plan  - Discontinue Lasix gtt and start 40 BID lasix

## 2020-10-29 NOTE — ASSESSMENT & PLAN NOTE
"Patient with newly diagnosed cardiomyopathy and HFpEF(EF 10%; wearing life vest). Presenting with Acute Heart Failure Exacerbation, c/o abd pain and SOB. On initial exam patient with 's, SBP 90s, and SpO2 100% when O2 turned off. Patient had JVD present and bibasilar rales. Peripheral edema was NOT present in his LEs. BNP 1278, Trop 0.132, AST/ALT 52/67, ALP 53. CXR w/ "no acute findings." Bibasilar rales has resolved and still no signs of peripheral edema. Patient improving with aggressive diuresis as patient has UOP of 9L since admission. TTE shows severe left atrial enlargement, moderate to severe MR, grade 2 diastolic dysfunction and EF 10%. Encourage patient to wear lifevest and avoid polysubstance abuse. Patient has had net output of 10L and down 10 kg in weight since admission.     Plan  - Consult Heart Transplant Service; Appreciate Assistance   - DC lasix gtt  - Diurese w/ Lasix 40 BID   - 2L fluid restriction   - Stirct I/Os  - Daily Standing weights   - Daily CMP/Mg and phos   - Possible daily potassium replacement  - Metoprolol 12.5 mg daily, lisinopril 2.5 BID if tolerated and discontinue spironolactone   - Coreg was previously held due to hypotension      "

## 2020-10-29 NOTE — CARE UPDATE
Spoke to patient's mother concerning possible discharge, and discussion was had concerning medications patient is taking, changes in status of patient including blood pressure readings and plan moving forward. Discussion was also had concerning importance of LifeVest. All questions were answered with plan for mother to be present at bedside in AM.

## 2020-10-29 NOTE — PROGRESS NOTES
"[facility] Hospital Medicine  Progress Note    Patient Name: Kevan Queen  MRN: [id number]  Patient Class: IP- Inpatient   Admission Date: 10/25/2020  Length of Stay: 4 days  Attending Physician: Kailyn Christensen MD  Primary Care Provider: To Obtain Unable    Hospital Medicine Team: [facility] HOSP MED 2 Aris Ureña MD    Subjective:     Principal Problem:Acute on chronic systolic congestive heart failure        HPI:  Patient is a 35 y.o. man with recent diagnosis of Cardiomyopathy and HFrEF(EF 10%). He was recently discharged from [facility]) two days prior to presenting to [facility] ED. He first noticed difficulty breathing September of 2020. When he first presented to the OSH he was diagnosed with bronchitis, and then two weeks later with CAP. It was during this most recent presentation with SOB at rest that he was diagnosed with cardiomyopathy and HFrEF. Prior to presenting to the OSH he was experiencing SOB at rest, PND, orthopnea, and peripheral edema to his knees. He was admitted at  for two weeks were he reports 10L of diuresis. Prior to discharge he was fitted with a life vest. Additionally he was discharged on coreg 3.125 BID and lasix 20 daily. He reports that he was compliant with his medication and denies any dietary indiscretion. He does report "feeling off" and uncomfortable when taking coreg. He returned to the ED today complaining of abd pain, N/V, SOB, and palpitations.   He denies fevers, chills, diarrhea, chest pain, peripheral edema, or coughing. Of note, the patient's father also had cardiomyopathy and received a heart transplant. He passed away a year ago due to complications from his heart failure and transplant. Patient denies alcohol, cocaine, or methamphetamine use.  In the ED labs were concerning with BNP 1278, Trop 0.132, AST/ALT 52/67, ALP 53. His CXR demonstrated "no acute findings." He was given 60 mg of IV lasix and admitted to Hospital Medicine for Acute on " Chronic Heart Failure Exacerbation.     Overview/Hospital Course:  Patient was admitted to hospital medicine for evaluation of advanced heart failure. On admission patient had signs of acute fluid overload and was given one time 80 mg of lasix with BNP 1278 with stable vital signs. Patient was sign by advanced heart failure/heart transplant who have given updated recommendations as well as evaluation for possible transplant. Patient currently not candidate due to polysubstance abuse. TTE performed on 10/26 showed severe left atrial enlargement, severe left ventricular eccentric hypertrophy. The left ventricle is severely enlarged with severely decreased systolic function. The estimated ejection fraction is 10%. There is severe left ventricular global hypokinesis. Grade II diastolic dysfunction. Mild right ventricular enlargement.Moderately reduced right ventricular systolic function.Moderate-to-severe mitral regurgitation. Patient started on toprol 25mg, lisinopril 2.5 mg BID, and IV diuresis. Patient discontinued off lasix gtt to lasix 40 BID on 10/28 with diuresis of 10 L net and 10 kg weight change since admission. Patient has maintained low BP with SBP in 80-90 range that has been sustained since 10/28. Patient stable with good perfusion and peripheral pulses on multiple examination and shows no signs of cardiogenic shock despite hypotension. HTS recommends adjustment to metoprolol 12.5 daily, lisinopril 2.5 daily first to progression to BID and discontinue spironolactone.     Interval History: NAEON. Patient continues to voice no complaints and has been compliant with wearing LifeVest and is agreeable with plan moving forward. Patient denies any constitutional, cardiac, respiratory, GI or  complaints.     Review of Systems   Constitutional: Negative for activity change, chills, fatigue and fever.   HENT: Negative for congestion, rhinorrhea, sore throat and trouble swallowing.    Eyes: Negative for photophobia  and visual disturbance.   Respiratory: Negative for cough, chest tightness, shortness of breath and wheezing.    Cardiovascular: Negative for chest pain, palpitations and leg swelling.   Gastrointestinal: Negative for abdominal distention, diarrhea, nausea and vomiting.   Genitourinary: Negative for difficulty urinating, dysuria, frequency and hematuria.   Musculoskeletal: Negative for arthralgias and myalgias.   Skin: Negative for rash and wound.   Neurological: Negative for dizziness, syncope, weakness and light-headedness.   Psychiatric/Behavioral: Negative for agitation, behavioral problems and confusion. The patient is not nervous/anxious.      Objective:     Vital Signs (Most Recent):  Temp: 96.4 °F (35.8 °C) (10/29/20 0919)  Pulse: 110 (10/29/20 1300)  Resp: 18 (10/29/20 0919)  BP: 111/83 (10/29/20 1300)  SpO2: 95 % (10/29/20 0919) Vital Signs (24h Range):  Temp:  [96.4 °F (35.8 °C)-97.5 °F (36.4 °C)] 96.4 °F (35.8 °C)  Pulse:  [] 110  Resp:  [16-18] 18  SpO2:  [91 %-96 %] 95 %  BP: ()/(57-83) 111/83     Weight: 68.2 kg (150 lb 5.7 oz)  Body mass index is 18.79 kg/m².    Intake/Output Summary (Last 24 hours) at 10/29/2020 1342  Last data filed at 10/29/2020 1000  Gross per 24 hour   Intake 400 ml   Output 1300 ml   Net -900 ml      Physical Exam  Vitals signs and nursing note reviewed.   Constitutional:       Appearance: Normal appearance.   HENT:      Head: Normocephalic and atraumatic.      Nose: Nose normal.      Mouth/Throat:      Mouth: Mucous membranes are moist.   Eyes:      Extraocular Movements: Extraocular movements intact.      Pupils: Pupils are equal, round, and reactive to light.   Neck:      Musculoskeletal: Normal range of motion and neck supple.      Comments: No JVD  Cardiovascular:      Rate and Rhythm: Regular rhythm. Tachycardia present.      Pulses: Normal pulses.      Heart sounds: No murmur. No gallop.    Pulmonary:      Effort: Pulmonary effort is normal. No respiratory  "distress.      Breath sounds: No wheezing.   Abdominal:      General: Abdomen is flat. Bowel sounds are normal. There is no distension.      Palpations: Abdomen is soft.      Tenderness: There is no abdominal tenderness.   Musculoskeletal: Normal range of motion.   Skin:     General: Skin is warm and dry.      Capillary Refill: Capillary refill takes less than 2 seconds.   Neurological:      General: No focal deficit present.      Mental Status: He is alert and oriented to person, place, and time.   Psychiatric:         Mood and Affect: Mood normal.         Behavior: Behavior normal.         Significant Labs:   CBC:   Recent Labs   Lab 10/28/20  0241 10/29/20  0601   WBC 10.87 10.42   HGB 13.1* 12.5*   HCT 39.8* 38.5*    339     CMP:   Recent Labs   Lab 10/28/20  0241 10/29/20  0601    139   K 3.8 4.3   CL 97 100   CO2 29 28   * 136*   BUN 31* 39*   CREATININE 1.4 1.5*   CALCIUM 9.8 9.6   PROT 7.4 7.0   ALBUMIN 3.3* 3.2*   BILITOT 0.6 0.5   ALKPHOS 63 59   AST 50* 32   ALT 74* 56*   ANIONGAP 15 11   EGFRNONAA >60.0 59.5*     Magnesium:   Recent Labs   Lab 10/28/20  0241 10/29/20  0601   MG 1.7 2.2       Significant Imaging: I have reviewed all pertinent imaging results/findings within the past 24 hours.      Assessment/Plan:      * Acute on chronic systolic congestive heart failure  Patient with newly diagnosed cardiomyopathy and HFpEF(EF 10%; wearing life vest). Presenting with Acute Heart Failure Exacerbation, c/o abd pain and SOB. On initial exam patient with 's, SBP 90s, and SpO2 100% when O2 turned off. Patient had JVD present and bibasilar rales. Peripheral edema was NOT present in his LEs. BNP 1278, Trop 0.132, AST/ALT 52/67, ALP 53. CXR w/ "no acute findings." Bibasilar rales has resolved and still no signs of peripheral edema. Patient improving with aggressive diuresis as patient has UOP of 9L since admission. TTE shows severe left atrial enlargement, moderate to severe MR, grade 2 " diastolic dysfunction and EF 10%. Encourage patient to wear lifevest and avoid polysubstance abuse. Patient has had net output of 10L and down 10 kg in weight since admission.     Plan  - Consult Heart Transplant Service; Appreciate Assistance   - DC lasix gtt  - Diurese w/ Lasix 40 BID   - 2L fluid restriction   - Stirct I/Os  - Daily Standing weights   - Daily CMP/Mg and phos   - Possible daily potassium replacement  - Metoprolol 12.5 mg daily, lisinopril 2.5 BID if tolerated and discontinue spironolactone   - Coreg was previously held due to hypotension        Cannabis use disorder, severe, dependence        Ecstasy abuse  Patient reports 10 year history of using 10-20 ecstasy pills daily for which he states helps his heart failure symptoms. Patient reports recent lifestyle changes attributed to worsening symptoms and change in environment recently. Patient denies any hospitalizations due to ecstasy and reports little desire to restart use since diagnosis.    Plan  - Consult to addiction psych with appreciated recommendations      Transaminitis  Patient continues to have elevated with AST/ALT 60/76 and is likely associated with congestive hepatopathy. Patient has some tender abdominal exam.    Plan  - Continue PO diuresis       Uses LifeVest defibrillator  Patient reports intermittent use of LifeVest defibrillator since receiving it on discharge from . Patient was encouraged to wear vest regularly.      Anemia of chronic disease  Patient found to have hgb of 13 on this admission and has been stable since admission but is likely 2/2 advanced heart failure and possible cardiorenal association.    Plan  - Continue to monitor CBC   - Transfuse as necessary     ROC (acute kidney injury)  ROC is likely related to cardiorenal syndrome and is responsive to IV diuresis. Patient Cr has been resolving with 1.4 from 1.6. Patient has been diuresed and is now has increase in Cr to 1.5. Diuresed net 10 L since  admission.    Plan  - Discontinue Lasix gtt and start 40 BID lasix       Generalized anxiety disorder  - continue home Xanax 0.25 mg nightly PRN       VTE Risk Mitigation (From admission, onward)         Ordered     enoxaparin injection 40 mg  Every 24 hours      10/25/20 2229     IP VTE HIGH RISK PATIENT  Once      10/25/20 2218     Place sequential compression device  Until discontinued      10/25/20 2218                Discharge Planning   PACO: 11/2/2020     Code Status: Full Code   Is the patient medically ready for discharge?:     Reason for patient still in hospital (select all that apply): Patient trending condition and Pending disposition  Discharge Plan A: Home                  Aris Ureña MD  Department of Hospital Medicine   [facility]

## 2020-10-29 NOTE — PLAN OF CARE
Recommend patent discharging patient on Toprol 12. 5 mg daily, lisinopril 2.5 mg BID. He  Can  follow-up in Heart failure Clinic in 2- 3  weeks

## 2020-10-29 NOTE — PLAN OF CARE
Pt remains free from falls and injury. Pt makes statement of no pain. Bed low and locked, Call light within reach.

## 2020-10-29 NOTE — NURSING
Patients B/P was 84/72 manually. MD Fermin notified. Stated to hold the metoprolol and Lasix. Will continue to monitor patient.

## 2020-10-29 NOTE — ASSESSMENT & PLAN NOTE
Patient continues to have elevated with AST/ALT 60/76 and is likely associated with congestive hepatopathy. Patient has some tender abdominal exam.    Plan  - Continue PO diuresis

## 2020-10-29 NOTE — PLAN OF CARE
10/29/20 0949   Post-Acute Status   Post-Acute Authorization Other   Other Status No Post-Acute Service Needs

## 2020-10-29 NOTE — PLAN OF CARE
Problem: Adult Inpatient Plan of Care  Goal: Plan of Care Review  Outcome: Ongoing, Progressing  Goal: Patient-Specific Goal (Individualization)  Outcome: Ongoing, Progressing  Goal: Absence of Hospital-Acquired Illness or Injury  Outcome: Ongoing, Progressing  Goal: Optimal Comfort and Wellbeing  Outcome: Ongoing, Progressing  Goal: Readiness for Transition of Care  Outcome: Ongoing, Progressing  Goal: Rounds/Family Conference  Outcome: Ongoing, Progressing     Problem: Fall Injury Risk  Goal: Absence of Fall and Fall-Related Injury  Outcome: Ongoing, Progressing     Problem: Adjustment to Illness (Heart Failure)  Goal: Optimal Coping  Outcome: Ongoing, Progressing     Problem: Arrhythmia/Dysrhythmia (Heart Failure)  Goal: Stable Heart Rate and Rhythm  Outcome: Ongoing, Progressing     Problem: Cardiac Output Decreased (Heart Failure)  Goal: Optimal Cardiac Output  Outcome: Ongoing, Progressing     Problem: Fluid Imbalance (Heart Failure)  Goal: Fluid Balance  Outcome: Ongoing, Progressing     Problem: Functional Ability Impaired (Heart Failure)  Goal: Optimal Functional Ability  Outcome: Ongoing, Progressing     Problem: Oral Intake Inadequate (Heart Failure)  Goal: Optimal Nutrition Intake  Outcome: Ongoing, Progressing     Problem: Respiratory Compromise (Heart Failure)  Goal: Effective Oxygenation and Ventilation  Outcome: Ongoing, Progressing     Problem: Sleep Disordered Breathing (Heart Failure)  Goal: Effective Breathing Pattern During Sleep  Outcome: Ongoing, Progressing     Problem: Electrolyte Imbalance (Acute Kidney Injury/Impairment)  Goal: Serum Electrolyte Balance  Outcome: Ongoing, Progressing     Problem: Fluid Imbalance (Acute Kidney Injury/Impairment)  Goal: Optimal Fluid Balance  Outcome: Ongoing, Progressing     Problem: Hematologic Alteration (Acute Kidney Injury/Impairment)  Goal: Hemoglobin, Hematocrit and Platelets Within Normal Range  Outcome: Ongoing, Progressing     Problem: Oral  Intake Inadequate (Acute Kidney Injury/Impairment)  Goal: Optimal Nutrition Intake  Outcome: Ongoing, Progressing     Problem: Renal Function Impairment (Acute Kidney Injury/Impairment)  Goal: Effective Renal Function  Outcome: Ongoing, Progressing

## 2020-10-29 NOTE — SUBJECTIVE & OBJECTIVE
Interval History: NAEON. Patient continues to voice no complaints and has been compliant with wearing LifeVest and is agreeable with plan moving forward. Patient denies any constitutional, cardiac, respiratory, GI or  complaints.     Review of Systems   Constitutional: Negative for activity change, chills, fatigue and fever.   HENT: Negative for congestion, rhinorrhea, sore throat and trouble swallowing.    Eyes: Negative for photophobia and visual disturbance.   Respiratory: Negative for cough, chest tightness, shortness of breath and wheezing.    Cardiovascular: Negative for chest pain, palpitations and leg swelling.   Gastrointestinal: Negative for abdominal distention, diarrhea, nausea and vomiting.   Genitourinary: Negative for difficulty urinating, dysuria, frequency and hematuria.   Musculoskeletal: Negative for arthralgias and myalgias.   Skin: Negative for rash and wound.   Neurological: Negative for dizziness, syncope, weakness and light-headedness.   Psychiatric/Behavioral: Negative for agitation, behavioral problems and confusion. The patient is not nervous/anxious.      Objective:     Vital Signs (Most Recent):  Temp: 96.4 °F (35.8 °C) (10/29/20 0919)  Pulse: 110 (10/29/20 1300)  Resp: 18 (10/29/20 0919)  BP: 111/83 (10/29/20 1300)  SpO2: 95 % (10/29/20 0919) Vital Signs (24h Range):  Temp:  [96.4 °F (35.8 °C)-97.5 °F (36.4 °C)] 96.4 °F (35.8 °C)  Pulse:  [] 110  Resp:  [16-18] 18  SpO2:  [91 %-96 %] 95 %  BP: ()/(57-83) 111/83     Weight: 68.2 kg (150 lb 5.7 oz)  Body mass index is 18.79 kg/m².    Intake/Output Summary (Last 24 hours) at 10/29/2020 1342  Last data filed at 10/29/2020 1000  Gross per 24 hour   Intake 400 ml   Output 1300 ml   Net -900 ml      Physical Exam  Vitals signs and nursing note reviewed.   Constitutional:       Appearance: Normal appearance.   HENT:      Head: Normocephalic and atraumatic.      Nose: Nose normal.      Mouth/Throat:      Mouth: Mucous membranes are  moist.   Eyes:      Extraocular Movements: Extraocular movements intact.      Pupils: Pupils are equal, round, and reactive to light.   Neck:      Musculoskeletal: Normal range of motion and neck supple.      Comments: No JVD  Cardiovascular:      Rate and Rhythm: Regular rhythm. Tachycardia present.      Pulses: Normal pulses.      Heart sounds: No murmur. No gallop.    Pulmonary:      Effort: Pulmonary effort is normal. No respiratory distress.      Breath sounds: No wheezing.   Abdominal:      General: Abdomen is flat. Bowel sounds are normal. There is no distension.      Palpations: Abdomen is soft.      Tenderness: There is no abdominal tenderness.   Musculoskeletal: Normal range of motion.   Skin:     General: Skin is warm and dry.      Capillary Refill: Capillary refill takes less than 2 seconds.   Neurological:      General: No focal deficit present.      Mental Status: He is alert and oriented to person, place, and time.   Psychiatric:         Mood and Affect: Mood normal.         Behavior: Behavior normal.         Significant Labs:   CBC:   Recent Labs   Lab 10/28/20  0241 10/29/20  0601   WBC 10.87 10.42   HGB 13.1* 12.5*   HCT 39.8* 38.5*    339     CMP:   Recent Labs   Lab 10/28/20  0241 10/29/20  0601    139   K 3.8 4.3   CL 97 100   CO2 29 28   * 136*   BUN 31* 39*   CREATININE 1.4 1.5*   CALCIUM 9.8 9.6   PROT 7.4 7.0   ALBUMIN 3.3* 3.2*   BILITOT 0.6 0.5   ALKPHOS 63 59   AST 50* 32   ALT 74* 56*   ANIONGAP 15 11   EGFRNONAA >60.0 59.5*     Magnesium:   Recent Labs   Lab 10/28/20  0241 10/29/20  0601   MG 1.7 2.2       Significant Imaging: I have reviewed all pertinent imaging results/findings within the past 24 hours.

## 2020-10-29 NOTE — MEDICAL/APP STUDENT
Progress Note  Hospital Medicine    Primary Team: [facility]  Admit Date: 10/25/2020   Length of Stay:  LOS: 4 days   SUBJECTIVE:   Reason for Admission:  Acute on chronic systolic congestive heart failure    HPI/Interval history:    36 yo man recently dx with cardiomyopathy and HFrEF (EF 10%), PMHx anxiety. First noticed difficulty breathing in Sept 2020, went to [facility] and was diagnosed with bronchitis then, two weeks later, with CAP. He presented with SOB at rest, PND, orthopnea, peripheral edema to knees during his most recent hospitalization at [facility], and was diagnosed with cardiomyopathy and HFpEF. At  he diuresed 10L and was discharged with a cardiac LifeVest. DCd with Lasix 20qd, carvedilol 3.125BID.      Presented to ED last night with abdo pain, N/V, SOB, and palpitations. No fevers, chills, diarrhea, chest pain, peripheral edema, coughing. No hx of alcohol, cocaine, meth use. Reported using 10-20 ecstasy tabs per day for the past 10 years. Had used ecstasy since age 18, but ceased while incarcerated from age [age]. 8PY smoking history with 0.5ppd.  His father had a hx of cardiomyopathy and heart transplant before passing last year due to complications from HF and transplant.      ED: BNP 1278, Trop WNL 0.132, AST/ALT 52/67, ALP 53. Presumptive positive THC and benzodiazepines. EKG No acute findings on CXR, but noticeable cardiomegaly, given 60mg IV lasix and admitted for acute/chronic HF exacerbation.      Interval: Patient hypotensive to 84 systolic, morning Lasix and spironolactone administered. Lisinopril and Toprol held.     Review of Systems:  Constitutional: no fever or chills  Respiratory: no cough or shortness of breath  Cardiovascular: no chest pain or palpitations  Gastrointestinal: no nausea or vomiting, no abdominal pain or change in bowel habits  Behavioral/Psych: no auditory or visual hallucinations     OBJECTIVE:     Temp:  [96.4 °F (35.8 °C)-97.5 °F (36.4 °C)]    Pulse:  []   Resp:  [16-18]   BP: ()/(57-83)   SpO2:  [91 %-96 %]  Body mass index is 18.79 kg/m².  Intake/Outake:  This Shift:  I/O this shift:  In: -   Out: 600 [Urine:600]    Net I/O past 24h:     Intake/Output Summary (Last 24 hours) at 10/29/2020 1325  Last data filed at 10/29/2020 1000  Gross per 24 hour   Intake 400 ml   Output 1300 ml   Net -900 ml             Physical Exam:  General: well developed, appears older than stated age, no distress  Lungs:  clear to auscultation bilaterally and normal respiratory effort  Cardiovascular: Heart: tachycardic, S1, S2 normal, no murmur, click, rub or gallop. No JVD appreciated.  Extremities: Homans sign is negative, no sign of DVT.   Pulses: 2+ and symmetric.  Abdomen/Rectal: Abdomen: soft, non-tender non-distented; bowel sounds normal; no masses,  no organomegaly.  Psych/Behavioral:  Alert and oriented, appropriate affect.  Lines: 1 PIV R antecubital, 2 days    Laboratory:  CBC/Anemia Labs: Coags:    Recent Labs   Lab 10/26/20  0811 10/27/20  0410 10/28/20  0241 10/29/20  0601   WBC  --  10.34 10.87 10.42   HGB  --  11.6* 13.1* 12.5*   HCT  --  34.4* 39.8* 38.5*   PLT  --  278 276 339   MCV  --  86 87 86   RDW  --  16.8* 17.3* 17.6*   IRON  --  60  --   --    FERRITIN  --  215  --   --    FOLATE 9.4  --   --   --    [id number] 630  --   --   --     No results for input(s): PT, INR, APTT in the last 168 hours.     Chemistries:   Recent Labs   Lab 10/27/20  0410 10/28/20  0241 10/29/20  0601    141 139   K 3.5 3.8 4.3   CL 99 97 100   CO2 29 29 28   BUN 25* 31* 39*   CREATININE 1.4 1.4 1.5*   CALCIUM 9.5 9.8 9.6   PROT 7.0 7.4 7.0   BILITOT 1.0 0.6 0.5   ALKPHOS 61 63 59   ALT 76* 74* 56*   AST 60* 50* 32   MG 1.6 1.7 2.2   PHOS 4.2 5.4* 4.8*        Medications:  Scheduled Meds:   enoxaparin  40 mg Subcutaneous Q24H    ferrous sulfate  325 mg Oral Daily    furosemide  40 mg Oral BID    metoprolol succinate  12.5 mg Oral Daily                       "       Continuous Infusions:  PRN Meds:.acetaminophen, ALPRAZolam, cyclobenzaprine, hydrOXYzine HCL, ondansetron, pneumococcal vaccine, polyethylene glycol, prochlorperazine, sodium chloride 0.9%     ASSESSMENT/PLAN:     The patient is a 36yo man with acute on chronic systolic CHF exacerbation, ROC, Generalized anxiety disorder, anemia of chronic disease, transaminitis, and use of a LifeVest defibrillator.     Acute on chronic systolic CHF exacerbation  Acute systolic CHF exacerbation possibly 2/2 genetic cardiomyopathy or drug-induced  -Patient transitioned to Lasix 40mg po tid from Lasix 80 IV bid  -Metoprolol succinate (Toprol-XL) 25mg po qd  -DC spironolactone  -Lisinopril 2.5 mg po bid to be started tomorrow AM  -Cardiac monitoring  -Monitor K and Mg   -PT/OT  -O2 prn     ROC  -Monitor CMP  -Cardiorenal etiology  -Diuresis (see acute on chronic systolic CHF exacerbation)     Cardiomyopathy of undetermined type  -L heart cath showed clean coronary arteries  -Currently not a candidate for advance options due to polysubstance abuse     Generalized Anxiety Disorder  -alprazolam 0.25mg po bid prn. Patient prescribed this medication during last admission to [facility], but does not take at home because he says it "does nothing for him".  -hydroxyzine 50mg po tid prn      Anemia of chronic disease  -Monitor daily CBC  -Normocytic normochromic anemia     Transaminitis  -Monitor with daily CMP  -2/2 congestive hepatopathy  -Hep A,B,C negative     Uses LifeVest defibrillator  -Patient says battery is depleted. Patient's mother was going to return with a replacement battery this morning.     Ecstasy abuse  -Patient last used prior to last admission to [facility]  -Seen by addiction psychiatry team for pre-transplant evaluation. Determined to be a high risk for transplant. Patient interested in starting rehab for polysubstance use (marijuana and ecstasy)  -States that he has moved back to [place] in his " mother's home, away from friends who use.  -Aware that his ecstasy could be contributing to cardiomyopathy, but feels that his ecstasy use helps raise his heart rate and alleviates his CHF symptoms. Wants to stop using for transplant consideration and to live for his seven children.    Active Hospital Problems    Diagnosis  POA    *Acute on chronic systolic congestive heart failure [I50.23]  Yes    Encounter for pre-transplant evaluation for heart transplant [Z01.818]  Not Applicable    Cannabis use disorder, severe, dependence [F12.20]  Yes     Chronic    Acute congestive heart failure [I50.9]  Yes    ROC (acute kidney injury) [N17.9]  Yes    Cardiomyopathy of undetermined type [I42.9]  Yes    Anemia of chronic disease [D63.8]  Yes    Uses LifeVest defibrillator [Z95.810]  Yes    Transaminitis [R74.01]  Yes    Ecstasy abuse [F16.10]  Yes    Generalized anxiety disorder [F41.1]  Yes      Resolved Hospital Problems   No resolved problems to display.       DVT ppx- Enoxaparin 40mg subQ q24h  CODE Status-Full    Dispo-DC 1 day    Aron Alvarez, MS-4  Hospital Medicine Team 2

## 2020-10-29 NOTE — PROGRESS NOTES
Rapid Response Nurse Chart Check     Chart check completed, abnormal VS noted. Bedside LPN, Shena contacted. No concerns verbalized at this time. Instructed to call [phone] for further concerns or assistance.

## 2020-10-30 VITALS
BODY MASS INDEX: 20.01 KG/M2 | HEIGHT: 75 IN | TEMPERATURE: 97 F | DIASTOLIC BLOOD PRESSURE: 58 MMHG | WEIGHT: 160.94 LBS | SYSTOLIC BLOOD PRESSURE: 95 MMHG | RESPIRATION RATE: 18 BRPM | HEART RATE: 115 BPM | OXYGEN SATURATION: 98 %

## 2020-10-30 DIAGNOSIS — I50.23 ACUTE ON CHRONIC SYSTOLIC CONGESTIVE HEART FAILURE: Primary | ICD-10-CM

## 2020-10-30 LAB
ALBUMIN SERPL BCP-MCNC: 3.1 G/DL (ref 3.5–5.2)
ALP SERPL-CCNC: 61 U/L (ref 55–135)
ALT SERPL W/O P-5'-P-CCNC: 47 U/L (ref 10–44)
ANION GAP SERPL CALC-SCNC: 13 MMOL/L (ref 8–16)
AST SERPL-CCNC: 34 U/L (ref 10–40)
BASOPHILS # BLD AUTO: 0.05 K/UL (ref 0–0.2)
BASOPHILS NFR BLD: 0.6 % (ref 0–1.9)
BILIRUB SERPL-MCNC: 0.4 MG/DL (ref 0.1–1)
BUN SERPL-MCNC: 38 MG/DL (ref 6–20)
CALCIUM SERPL-MCNC: 9.4 MG/DL (ref 8.7–10.5)
CHLORIDE SERPL-SCNC: 102 MMOL/L (ref 95–110)
CO2 SERPL-SCNC: 23 MMOL/L (ref 23–29)
CREAT SERPL-MCNC: 1.4 MG/DL (ref 0.5–1.4)
DIFFERENTIAL METHOD: ABNORMAL
EOSINOPHIL # BLD AUTO: 0.2 K/UL (ref 0–0.5)
EOSINOPHIL NFR BLD: 2.3 % (ref 0–8)
ERYTHROCYTE [DISTWIDTH] IN BLOOD BY AUTOMATED COUNT: 17.5 % (ref 11.5–14.5)
EST. GFR  (AFRICAN AMERICAN): >60 ML/MIN/1.73 M^2
EST. GFR  (NON AFRICAN AMERICAN): >60 ML/MIN/1.73 M^2
GLUCOSE SERPL-MCNC: 133 MG/DL (ref 70–110)
HCT VFR BLD AUTO: 36.8 % (ref 40–54)
HGB BLD-MCNC: 11.8 G/DL (ref 14–18)
IMM GRANULOCYTES # BLD AUTO: 0.02 K/UL (ref 0–0.04)
IMM GRANULOCYTES NFR BLD AUTO: 0.2 % (ref 0–0.5)
LYMPHOCYTES # BLD AUTO: 4.3 K/UL (ref 1–4.8)
LYMPHOCYTES NFR BLD: 48.9 % (ref 18–48)
MAGNESIUM SERPL-MCNC: 2 MG/DL (ref 1.6–2.6)
MCH RBC QN AUTO: 28.4 PG (ref 27–31)
MCHC RBC AUTO-ENTMCNC: 32.1 G/DL (ref 32–36)
MCV RBC AUTO: 89 FL (ref 82–98)
MONOCYTES # BLD AUTO: 0.8 K/UL (ref 0.3–1)
MONOCYTES NFR BLD: 9.2 % (ref 4–15)
NEUTROPHILS # BLD AUTO: 3.4 K/UL (ref 1.8–7.7)
NEUTROPHILS NFR BLD: 38.8 % (ref 38–73)
NRBC BLD-RTO: 0 /100 WBC
PHOSPHATE SERPL-MCNC: 4.9 MG/DL (ref 2.7–4.5)
PLATELET # BLD AUTO: 321 K/UL (ref 150–350)
PMV BLD AUTO: 10.7 FL (ref 9.2–12.9)
POTASSIUM SERPL-SCNC: 3.8 MMOL/L (ref 3.5–5.1)
PROT SERPL-MCNC: 6.6 G/DL (ref 6–8.4)
RBC # BLD AUTO: 4.15 M/UL (ref 4.6–6.2)
SODIUM SERPL-SCNC: 138 MMOL/L (ref 136–145)
VIT B1 BLD-MCNC: 58 UG/L (ref 38–122)
WBC # BLD AUTO: 8.74 K/UL (ref 3.9–12.7)

## 2020-10-30 PROCEDURE — 85025 COMPLETE CBC W/AUTO DIFF WBC: CPT

## 2020-10-30 PROCEDURE — 99239 HOSP IP/OBS DSCHRG MGMT >30: CPT | Mod: ,,, | Performed by: INTERNAL MEDICINE

## 2020-10-30 PROCEDURE — 84100 ASSAY OF PHOSPHORUS: CPT

## 2020-10-30 PROCEDURE — 83735 ASSAY OF MAGNESIUM: CPT

## 2020-10-30 PROCEDURE — 80053 COMPREHEN METABOLIC PANEL: CPT

## 2020-10-30 PROCEDURE — 25000003 PHARM REV CODE 250: Performed by: STUDENT IN AN ORGANIZED HEALTH CARE EDUCATION/TRAINING PROGRAM

## 2020-10-30 PROCEDURE — 99239 PR HOSPITAL DISCHARGE DAY,>30 MIN: ICD-10-PCS | Mod: ,,, | Performed by: INTERNAL MEDICINE

## 2020-10-30 PROCEDURE — 36415 COLL VENOUS BLD VENIPUNCTURE: CPT

## 2020-10-30 RX ORDER — METOPROLOL SUCCINATE 25 MG/1
12.5 TABLET, EXTENDED RELEASE ORAL DAILY
Qty: 15 TABLET | Refills: 11 | Status: SHIPPED | OUTPATIENT
Start: 2020-10-30 | End: 2020-11-05 | Stop reason: SDUPTHER

## 2020-10-30 RX ORDER — FUROSEMIDE 20 MG/1
20 TABLET ORAL 2 TIMES DAILY PRN
Qty: 60 TABLET | Refills: 0 | Status: SHIPPED | OUTPATIENT
Start: 2020-10-30 | End: 2020-11-05 | Stop reason: SDUPTHER

## 2020-10-30 RX ORDER — LISINOPRIL 2.5 MG/1
2.5 TABLET ORAL DAILY
Qty: 90 TABLET | Refills: 3 | Status: SHIPPED | OUTPATIENT
Start: 2020-10-30 | End: 2020-11-05 | Stop reason: ALTCHOICE

## 2020-10-30 RX ORDER — NAPROXEN SODIUM 220 MG/1
81 TABLET, FILM COATED ORAL DAILY
Status: CANCELLED | OUTPATIENT
Start: 2020-10-30

## 2020-10-30 RX ORDER — METOPROLOL SUCCINATE 25 MG/1
12.5 TABLET, EXTENDED RELEASE ORAL DAILY
Qty: 45 TABLET | Refills: 2 | Status: CANCELLED | OUTPATIENT
Start: 2020-10-31

## 2020-10-30 RX ORDER — LISINOPRIL 2.5 MG/1
2.5 TABLET ORAL DAILY
Qty: 90 TABLET | Refills: 2 | Status: CANCELLED | OUTPATIENT
Start: 2020-10-31

## 2020-10-30 RX ORDER — FERROUS SULFATE 325(65) MG
325 TABLET, DELAYED RELEASE (ENTERIC COATED) ORAL DAILY
Qty: 30 TABLET | Refills: 2 | Status: CANCELLED | OUTPATIENT
Start: 2020-10-31

## 2020-10-30 RX ORDER — FERROUS SULFATE 325(65) MG
325 TABLET, DELAYED RELEASE (ENTERIC COATED) ORAL DAILY
Qty: 90 TABLET | Refills: 0 | Status: ON HOLD | OUTPATIENT
Start: 2020-10-30 | End: 2022-05-27 | Stop reason: HOSPADM

## 2020-10-30 RX ADMIN — FERROUS SULFATE TAB EC 325 MG (65 MG FE EQUIVALENT) 325 MG: 325 (65 FE) TABLET DELAYED RESPONSE at 08:10

## 2020-10-30 RX ADMIN — METOPROLOL SUCCINATE 12.5 MG: 25 TABLET, EXTENDED RELEASE ORAL at 08:10

## 2020-10-30 RX ADMIN — LISINOPRIL 2.5 MG: 2.5 TABLET ORAL at 08:10

## 2020-10-30 RX ADMIN — FUROSEMIDE 40 MG: 40 TABLET ORAL at 08:10

## 2020-10-30 NOTE — PLAN OF CARE
10/30/20 0835   Post-Acute Status   Post-Acute Authorization Other   Other Status No Post-Acute Service Needs

## 2020-10-30 NOTE — DISCHARGE INSTRUCTIONS
You were admitted for exacerbation of heart failure. You were seen by HTS who will continue to evaluate you in clinic for possible transplant. You were also seen by addiction psych who also set up a treatment plan for continued follow up which is also key for transplant evaluation. You should continue to weigh yourself daily and avoid excess salt and water intake daily. You were prescribed as needed lasix and you will need to follow up with PCP as well.

## 2020-10-30 NOTE — ASSESSMENT & PLAN NOTE
ROC is likely related to cardiorenal syndrome and is responsive to IV diuresis. Patient Cr has been resolving with 1.4 from 1.6. Patient has been diuresed and is now has increase in Cr to 1.5. Diuresed net 10 L since admission.    Plan  - Resolved to baseline of 1.4   - Follow up with HTS and PCP in 2 weeks

## 2020-10-30 NOTE — PLAN OF CARE
Cm called in for cardiology-transplant appt, office will call pt with appt date and time. CM  made pt aware , pt states he will make his own PCP appt.       10/30/20 1228   Final Note   Assessment Type Final Discharge Note   Anticipated Discharge Disposition Home   Hospital Follow Up  Appt(s) scheduled? Yes   Post-Acute Status   Post-Acute Authorization HME   Discharge Delays None known at this time   Rajani Crawford, MSN  Case Management  Ext [phone]

## 2020-10-30 NOTE — PLAN OF CARE
Patient is ready for discharge. Patient stable alert and oriented. IVs removed. No complaints of pain. Discussed discharge plan. Reviewed medications and side effects, appointments, and answered questions with patient and family.     Problem: Adult Inpatient Plan of Care  Goal: Plan of Care Review  Outcome: Met  Goal: Patient-Specific Goal (Individualization)  Outcome: Met  Goal: Absence of Hospital-Acquired Illness or Injury  Outcome: Met  Goal: Optimal Comfort and Wellbeing  Outcome: Met  Goal: Readiness for Transition of Care  Outcome: Met  Goal: Rounds/Family Conference  Outcome: Met     Problem: Fall Injury Risk  Goal: Absence of Fall and Fall-Related Injury  Outcome: Met     Problem: Adjustment to Illness (Heart Failure)  Goal: Optimal Coping  Outcome: Met

## 2020-10-30 NOTE — ASSESSMENT & PLAN NOTE
Patient evaluated by addiction psychiatry and has plan for telephone follow up with discharge. Discussion with patient was had concerning importance for adherence in the setting of transplant evaluation.

## 2020-10-30 NOTE — ASSESSMENT & PLAN NOTE
Patient reports 10 year history of using 10-20 ecstasy pills daily for which he states helps his heart failure symptoms. Patient reports recent lifestyle changes attributed to worsening symptoms and change in environment recently. Patient denies any hospitalizations due to ecstasy and reports little desire to restart use since diagnosis.    Plan  - Consult to addiction psych with appreciated recommendations  - Follow up with psych outpatient via telephone

## 2020-10-30 NOTE — DISCHARGE SUMMARY
"[facility] Hospital Medicine  Discharge Summary      Patient Name: Kevan Queen  MRN: [id number]  Admission Date: 10/25/2020  Hospital Length of Stay: 5 days  Discharge Date and Time:  10/30/2020 4:13 PM  Attending Physician: No att. providers found   Discharging Provider: Aris Ureña MD  Primary Care Provider: To Obtain Unable  Hospital Medicine Team: [facility] 2 Aris Ureña MD    HPI:   Patient is a 35 y.o. man with recent diagnosis of Cardiomyopathy and HFrEF(EF 10%). He was recently discharged from [facility]) two days prior to presenting to [facility] ED. He first noticed difficulty breathing September of 2020. When he first presented to the OSH he was diagnosed with bronchitis, and then two weeks later with CAP. It was during this most recent presentation with SOB at rest that he was diagnosed with cardiomyopathy and HFrEF. Prior to presenting to the OSH he was experiencing SOB at rest, PND, orthopnea, and peripheral edema to his knees. He was admitted at  for two weeks were he reports 10L of diuresis. Prior to discharge he was fitted with a life vest. Additionally he was discharged on coreg 3.125 BID and lasix 20 daily. He reports that he was compliant with his medication and denies any dietary indiscretion. He does report "feeling off" and uncomfortable when taking coreg. He returned to the ED today complaining of abd pain, N/V, SOB, and palpitations.   He denies fevers, chills, diarrhea, chest pain, peripheral edema, or coughing. Of note, the patient's father also had cardiomyopathy and received a heart transplant. He passed away a year ago due to complications from his heart failure and transplant. Patient denies alcohol, cocaine, or methamphetamine use.  In the ED labs were concerning with BNP 1278, Trop 0.132, AST/ALT 52/67, ALP 53. His CXR demonstrated "no acute findings." He was given 60 mg of IV lasix and admitted to Hospital Medicine for Acute on Chronic Heart " Failure Exacerbation.     * No surgery found *      Hospital Course:   Patient was admitted to hospital medicine for evaluation of advanced heart failure. On admission patient had signs of acute fluid overload and was given one time 80 mg of lasix with BNP 1278 with stable vital signs. Patient was sign by advanced heart failure/heart transplant who have given updated recommendations as well as evaluation for possible transplant. Patient currently not candidate due to polysubstance abuse. TTE performed on 10/26 showed severe left atrial enlargement, severe left ventricular eccentric hypertrophy. The left ventricle is severely enlarged with severely decreased systolic function. The estimated ejection fraction is 10%. There is severe left ventricular global hypokinesis. Grade II diastolic dysfunction. Mild right ventricular enlargement.Moderately reduced right ventricular systolic function.Moderate-to-severe mitral regurgitation. Patient started on toprol 25mg, lisinopril 2.5 mg BID, and IV diuresis. Patient discontinued off lasix gtt to lasix 40 BID on 10/28 with diuresis of 10 L net and 10 kg weight change since admission. Patient has maintained low BP with SBP in 80-90 range that has been sustained since 10/28. Patient stable with good perfusion and peripheral pulses on multiple examination and shows no signs of cardiogenic shock despite hypotension. HTS recommends adjustment to metoprolol 12.5 daily, lisinopril 2.5 daily first to progression to BID and discontinue spironolactone.      Consults:   Consults (From admission, onward)        Status Ordering Provider     Inpatient consult to Psychiatry  Once     Provider:  (Not yet assigned)    Completed YOLIS REY          * Acute on chronic systolic congestive heart failure  Patient with newly diagnosed cardiomyopathy and HFpEF(EF 10%; wearing life vest). Presenting with Acute Heart Failure Exacerbation, c/o abd pain and SOB. On initial exam patient with 's, SBP  "90s, and SpO2 100% when O2 turned off. Patient had JVD present and bibasilar rales. Peripheral edema was NOT present in his LEs. BNP 1278, Trop 0.132, AST/ALT 52/67, ALP 53. CXR w/ "no acute findings." Bibasilar rales has resolved and still no signs of peripheral edema. Patient improving with aggressive diuresis as patient has UOP of 9L since admission. TTE shows severe left atrial enlargement, moderate to severe MR, grade 2 diastolic dysfunction and EF 10%. Encourage patient to wear lifevest and avoid polysubstance abuse. Patient has had net output of 10L and down 10 kg in weight since admission.     Plan  - Metoprolol 12.5 mg daily, lisinopril 2.5 daily with up titration to BID  - Outpatient heart failure/transplant evaluation in 2 weeks  - lasix PRN  - 2L fluid restriction   - Stirct I/Os  - Daily Standing weights           Cannabis use disorder, severe, dependence  Patient evaluated by addiction psychiatry and has plan for telephone follow up with discharge. Discussion with patient was had concerning importance for adherence in the setting of transplant evaluation.      Ecstasy abuse  Patient reports 10 year history of using 10-20 ecstasy pills daily for which he states helps his heart failure symptoms. Patient reports recent lifestyle changes attributed to worsening symptoms and change in environment recently. Patient denies any hospitalizations due to ecstasy and reports little desire to restart use since diagnosis.    Plan  - Consult to addiction psych with appreciated recommendations  - Follow up with psych outpatient via telephone      Transaminitis  Patient continues to have elevated with AST/ALT 60/76 and is likely associated with congestive hepatopathy. Patient has some tender abdominal exam.    Plan  - Resolved      Uses LifeVest defibrillator  Patient reports intermittent use of LifeVest defibrillator since receiving it on discharge from . Patient was encouraged to wear vest regularly.      Anemia of " chronic disease  Patient found to have hgb of 13 on this admission and has been stable since admission but is likely 2/2 advanced heart failure and possible cardiorenal association.    Plan  - Resolved   - Follow up with PCP    ROC (acute kidney injury)  ROC is likely related to cardiorenal syndrome and is responsive to IV diuresis. Patient Cr has been resolving with 1.4 from 1.6. Patient has been diuresed and is now has increase in Cr to 1.5. Diuresed net 10 L since admission.    Plan  - Resolved to baseline of 1.4   - Follow up with HTS and PCP in 2 weeks      Generalized anxiety disorder  - continue home Xanax 0.25 mg nightly PRN and has been resolved since admission        Final Active Diagnoses:    Diagnosis Date Noted POA    PRINCIPAL PROBLEM:  Acute on chronic systolic congestive heart failure [I50.23] 10/25/2020 Yes    Encounter for pre-transplant evaluation for heart transplant [Z01.818] 10/28/2020 Not Applicable    Cannabis use disorder, severe, dependence [F12.20] 10/28/2020 Yes     Chronic    Acute congestive heart failure [I50.9]  Yes    ROC (acute kidney injury) [N17.9] 10/26/2020 Yes    Cardiomyopathy of undetermined type [I42.9] 10/26/2020 Yes    Anemia of chronic disease [D63.8] 10/26/2020 Yes    Uses LifeVest defibrillator [Z95.810] 10/26/2020 Yes    Transaminitis [R74.01] 10/26/2020 Yes    Ecstasy abuse [F16.10] 10/26/2020 Yes    Generalized anxiety disorder [F41.1] 10/25/2020 Yes      Problems Resolved During this Admission:       Discharged Condition: fair    Disposition: Home or Self Care    Follow Up:  Follow-up Information     HEART, FAILURE CLINIC In 3 weeks.    Specialty: Transplant  Why: GDMT eval and continued transplant evaluation, pt will receive call from clinic for appt           To Obtain Unable.    Why: Pt states he will set up this appointment               Patient Instructions:   No discharge procedures on file.    Significant Diagnostic Studies: Labs:   CMP   Recent  Labs   Lab 10/29/20  0601 10/30/20  0253    138   K 4.3 3.8    102   CO2 28 23   * 133*   BUN 39* 38*   CREATININE 1.5* 1.4   CALCIUM 9.6 9.4   PROT 7.0 6.6   ALBUMIN 3.2* 3.1*   BILITOT 0.5 0.4   ALKPHOS 59 61   AST 32 34   ALT 56* 47*   ANIONGAP 11 13   ESTGFRAFRICA >60.0 >60.0   EGFRNONAA 59.5* >60.0    and CBC   Recent Labs   Lab 10/29/20  0601 10/30/20  0253   WBC 10.42 8.74   HGB 12.5* 11.8*   HCT 38.5* 36.8*    321       Pending Diagnostic Studies:     None         Medications:  Reconciled Home Medications:      Medication List      START taking these medications    ferrous sulfate 325 (65 FE) MG EC tablet  Take 1 tablet (325 mg total) by mouth once daily.     lisinopriL 2.5 MG tablet  Commonly known as: PRINIVIL,ZESTRIL  Take 1 tablet (2.5 mg total) by mouth once daily.     metoprolol succinate 25 MG 24 hr tablet  Commonly known as: TOPROL-XL  Take 0.5 tablets (12.5 mg total) by mouth once daily.        CHANGE how you take these medications    furosemide 20 MG tablet  Commonly known as: LASIX  Take 1 tablet (20 mg total) by mouth 2 (two) times daily as needed.  What changed:   · when to take this  · reasons to take this        STOP taking these medications    ALPRAZolam 0.25 MG tablet  Commonly known as: XANAX     aspirin 325 MG EC tablet  Commonly known as: ECOTRIN     carvediloL 3.125 MG tablet  Commonly known as: COREG            Indwelling Lines/Drains at time of discharge:   Lines/Drains/Airways     None                 Time spent on the discharge of patient: 40 minutes  Patient was seen and examined on the date of discharge and determined to be suitable for discharge.         Aris Ureña MD  Department of Hospital Medicine  [facility]

## 2020-10-30 NOTE — ASSESSMENT & PLAN NOTE
Patient found to have hgb of 13 on this admission and has been stable since admission but is likely 2/2 advanced heart failure and possible cardiorenal association.    Plan  - Resolved   - Follow up with PCP

## 2020-11-02 ENCOUNTER — PATIENT OUTREACH (OUTPATIENT)
Dept: ADMINISTRATIVE | Facility: CLINIC | Age: 35
End: 2020-11-02

## 2020-11-02 NOTE — PATIENT INSTRUCTIONS
Heart Failure: Making Changes to Your Diet  You have a condition called heart failure. It is also known as congestive heart failure, or CHF. When you have heart failure, excess fluid is more likely to build up in your body. This makes the heart work harder to pump blood. Fluid buildup causes symptoms such as shortness of breath and edema (swelling). Controlling the amount of salt (sodium) you eat may help stop fluid from building up. Your doctor may also tell you to reduce the amount of fluid you drink.  Reading Food Labels  Your healthcare provider will tell you how much sodium you can eat each day. Read food labels to keep track. Keep in mind that certain foods are high in salt. These include canned, frozen, and processed foods. Check the amount of sodium in each serving. Watch out for high-sodium ingredients. These include MSG (monosodium glutamate), baking soda, and sodium phosphate.   Eating Less Salt  Give yourself time to get used to eating less salt. It may take a little while. Here are some tips to help:   Take the saltshaker off the table. Replace it with salt-free herb mixes and spices.   Eat fresh or plain frozen vegetables. These have much less salt than canned vegetables.   Choose low-sodium snacks like sodium-free pretzels, crackers, or air-popped popcorn.   Dont add salt to your food when youre cooking. Instead, season your foods with pepper, lemon, garlic, or onion.   When you eat out, ask that your food be cooked without added salt.   If Youre Told to Limit Fluid  You may need to limit fluid intake to help prevent edema. This includes anything that is liquid at room temperature, such as ice cream and soup. If your doctor tells you to limit fluid, try these tips:   Measure drinks in a measuring cup before you drink them. This will help you meet daily goals.   Chill drinks to make them more refreshing.   Suck on frozen lemon wedges to quench thirst.   Only drink when youre  thirsty.   Chew sugarless gum or suck on hard candy to keep your mouth moist.   When to Call Your Doctor  Call your doctor right away if you have any symptoms of worsening heart failure. These can include:   Sudden weight gain   Increased swelling of your legs or ankles   Trouble breathing when youre resting or at night  © 9113-8920 [employer], [address] [place] [address], [place] [address]. All rights reserved. This information is not intended as a substitute for professional medical care. Always follow your healthcare professional's instructions.

## 2020-11-05 ENCOUNTER — OFFICE VISIT (OUTPATIENT)
Dept: TRANSPLANT | Facility: CLINIC | Age: 35
End: 2020-11-05
Attending: INTERNAL MEDICINE
Payer: MEDICAID

## 2020-11-05 VITALS
OXYGEN SATURATION: 100 % | DIASTOLIC BLOOD PRESSURE: 58 MMHG | WEIGHT: 171.5 LBS | HEART RATE: 107 BPM | SYSTOLIC BLOOD PRESSURE: 106 MMHG | BODY MASS INDEX: 21.32 KG/M2 | HEIGHT: 75 IN

## 2020-11-05 DIAGNOSIS — Z95.810 USES LIFEVEST DEFIBRILLATOR: ICD-10-CM

## 2020-11-05 DIAGNOSIS — F16.10 ECSTASY ABUSE: ICD-10-CM

## 2020-11-05 DIAGNOSIS — I42.0 DILATED CARDIOMYOPATHY: ICD-10-CM

## 2020-11-05 DIAGNOSIS — I50.42 CHRONIC COMBINED SYSTOLIC AND DIASTOLIC CONGESTIVE HEART FAILURE: Primary | ICD-10-CM

## 2020-11-05 DIAGNOSIS — F12.20 CANNABIS USE DISORDER, SEVERE, DEPENDENCE: Chronic | ICD-10-CM

## 2020-11-05 PROBLEM — Z01.818 ENCOUNTER FOR PRE-TRANSPLANT EVALUATION FOR HEART TRANSPLANT: Status: RESOLVED | Noted: 2020-10-28 | Resolved: 2020-11-05

## 2020-11-05 PROBLEM — I50.23 ACUTE ON CHRONIC SYSTOLIC CONGESTIVE HEART FAILURE: Status: RESOLVED | Noted: 2020-10-25 | Resolved: 2020-11-05

## 2020-11-05 PROBLEM — R74.01 TRANSAMINITIS: Status: RESOLVED | Noted: 2020-10-26 | Resolved: 2020-11-05

## 2020-11-05 PROBLEM — N17.9 AKI (ACUTE KIDNEY INJURY): Status: RESOLVED | Noted: 2020-10-26 | Resolved: 2020-11-05

## 2020-11-05 PROCEDURE — 99204 OFFICE O/P NEW MOD 45 MIN: CPT | Mod: S$PBB,,, | Performed by: INTERNAL MEDICINE

## 2020-11-05 PROCEDURE — 99999 PR PBB SHADOW E&M-EST. PATIENT-LVL III: ICD-10-PCS | Mod: PBBFAC,,, | Performed by: INTERNAL MEDICINE

## 2020-11-05 PROCEDURE — 99204 PR OFFICE/OUTPT VISIT, NEW, LEVL IV, 45-59 MIN: ICD-10-PCS | Mod: S$PBB,,, | Performed by: INTERNAL MEDICINE

## 2020-11-05 PROCEDURE — 99999 PR PBB SHADOW E&M-EST. PATIENT-LVL III: CPT | Mod: PBBFAC,,, | Performed by: INTERNAL MEDICINE

## 2020-11-05 PROCEDURE — 99213 OFFICE O/P EST LOW 20 MIN: CPT | Mod: PBBFAC | Performed by: INTERNAL MEDICINE

## 2020-11-05 RX ORDER — SACUBITRIL AND VALSARTAN 24; 26 MG/1; MG/1
1 TABLET, FILM COATED ORAL 2 TIMES DAILY
Qty: 60 TABLET | Refills: 0 | Status: SHIPPED | OUTPATIENT
Start: 2020-11-05 | End: 2020-12-01

## 2020-11-05 RX ORDER — FUROSEMIDE 20 MG/1
20 TABLET ORAL DAILY
Qty: 60 TABLET | Refills: 5 | Status: SHIPPED | OUTPATIENT
Start: 2020-11-05 | End: 2020-12-01 | Stop reason: ALTCHOICE

## 2020-11-05 RX ORDER — METOPROLOL SUCCINATE 25 MG/1
25 TABLET, EXTENDED RELEASE ORAL DAILY
Qty: 30 TABLET | Refills: 1 | Status: SHIPPED | OUTPATIENT
Start: 2020-11-05 | End: 2021-01-25 | Stop reason: SDUPTHER

## 2020-11-05 NOTE — PATIENT INSTRUCTIONS
Stop lisinopril  Since your last dose of lisinopril was this morning, wait until tomorrow night to start entresto 24-26 mg twice a day  If you mix lisinopril and entresto serious life-threatening reaction might occur so for safety please discard any lisinopril you have and be certain no one refills  Reduce furosemide to once a day with evening dose only if needed  In 1 week increase metoprolol succinate from half tablet to full tablet a day  Obtain BMP (lab) at home in 2 weeks--HF clinic will track result so if you do not hear anything from us within 24 hrs please call HF clinic so they can track lab result  See me in about 4 weeks to up-titrate medication

## 2020-11-05 NOTE — PROGRESS NOTES
Subjective:     Patient ID:  Kevan Queen is a 35 y.o. male who presents for follow-up of Congestive Heart Failure and Consult    HPI: 34 yo BM accompanied by Mother (her  had tx here) he has given up ecstasy in all drugs with the exception of marijuana since his hospital stay last month.  He has been feeling well except reports that he ran out of furosemide as he was using an outside prescription; I confirmed with [facility] that the Lasix 20 mg strength and prescription given at hospital discharge was not dispensed.  His last dose of Lasix was day before yesterday.  He has been taking it twice daily.    He does still have some dyspnea on exertion but reports being able to sleep flat without PND.  He and his mother report mild dyspnea walking from the garage to the elevator which included walking up the ramp in the lobby.  No edema.  He has a life vest that has not alarmed or fired.    Past medical history:  Cardiomyopathy not certain if familial drug abuse or other cause; congestive heart failure    Psychiatry consulted during the hospital regarding substance abuse and the consult is available in the medical record.    Operations:  None    Social history:  Never smoke cigarettes, no significant alcohol intake, was using ecstasy up to the last admission at the end of October 2020, continues to use marijuana    Review of Systems   Constitution: Positive for malaise/fatigue. Negative for chills, fever, night sweats, weight gain and weight loss.   Cardiovascular: Positive for dyspnea on exertion. Negative for chest pain, irregular heartbeat, leg swelling, near-syncope, orthopnea, palpitations, paroxysmal nocturnal dyspnea and syncope.   Respiratory: Negative for cough, sputum production and wheezing.    Hematologic/Lymphatic: Does not bruise/bleed easily.   Musculoskeletal: Negative for arthritis, joint pain and stiffness.   Gastrointestinal: Negative for hematochezia and melena.   Genitourinary:  "Negative for hematuria.   Neurological: Negative for brief paralysis, focal weakness, seizures and weakness.   Psychiatric/Behavioral: Positive for substance abuse.     Objective:   Physical Exam   Constitutional: He is oriented to person, place, and time. He appears well-developed and well-nourished.   BP (!) 106/58 (BP Location: Right arm, Patient Position: Sitting, BP Method: Medium (Automatic))   Pulse 107   Ht 6' 3" (1.905 m)   Wt 77.8 kg (171 lb 8.3 oz)   SpO2 100%   BMI 21.44 kg/m²   Thin BM in NAD   HENT:   Head: Normocephalic and atraumatic.   Eyes: Conjunctivae are normal. Right eye exhibits no discharge. Left eye exhibits no discharge. No scleral icterus.   Neck: No JVD present. No thyromegaly present.   Cardiovascular: Normal rate and regular rhythm. Exam reveals gallop (Soft S3 at apex).   Murmur (Grade 2/6 systolic murmur lower left sternal border and apex consistent with mitral regurgitation) heard.  Pulmonary/Chest: Effort normal and breath sounds normal.   Abdominal: Soft. Bowel sounds are normal. He exhibits no distension (Liver span 15 cm by percussion) and no mass. There is no abdominal tenderness. There is no rebound and no guarding.   Musculoskeletal:         General: No tenderness or edema.   Neurological: He is alert and oriented to person, place, and time.   Skin: Skin is warm and dry.   Psychiatric: He has a normal mood and affect. His behavior is normal. Judgment and thought content normal.      Lab Results   Component Value Date     (H) 11/05/2020    BNP 1,278 (H) 10/25/2020     Lab Results   Component Value Date     11/05/2020    K 4.1 11/05/2020    MG 2.4 11/05/2020     11/05/2020    CO2 26 11/05/2020    BUN 45 (H) 11/05/2020    CREATININE 1.6 (H) 11/05/2020    GLU 79 11/05/2020    CALCIUM 9.5 11/05/2020    AST 34 10/30/2020    ALT 47 (H) 10/30/2020    ALBUMIN 3.1 (L) 10/30/2020    PROT 6.6 10/30/2020    BILITOT 0.4 10/30/2020     10/26/2020 Conclusion  · Severe " left atrial enlargement.  · There is severe left ventricular eccentric hypertrophy.  · The left ventricle is severely enlarged with severely decreased systolic function. The estimated ejection fraction is 10%.  · There is severe left ventricular global hypokinesis.  · Grade II diastolic dysfunction.  · Mild right ventricular enlargement.  · Moderately reduced right ventricular systolic function.  · Moderate-to-severe mitral regurgitation.  · Mild to moderate tricuspid regurgitation.  · Intermediate central venous pressure (8 mmHg).  · There is pulmonary hypertension.  · The estimated PA systolic pressure is 44 mmHg.  · Trivial posterior pericardial effusion.     Assessment:     1. Chronic combined systolic and diastolic congestive heart failure    2. Dilated cardiomyopathy    3. Uses LifeVest defibrillator    4. Ecstasy abuse    5. Cannabis use disorder, severe, dependence      Plan:   He is not a suitable transplant candidate due to his drug abuse at this point so will change to [facility] failure [facility] for management and keep track of him in the high risk pool  I have given him prescription for furosemide today 20 mg every day and 20 mg in evening only if needed  Will stop lisinopril and replace with Entresto 24-26 mg twice daily.  His last dose of lisinopril was this morning so he will start Entresto tomorrow night.  In 1 week increase metoprolol succinate from 12.5 mg to 25 mg daily  Obtain BMP in 2 weeks [facility] failure [facility] to track from local lab  Return to clinic in 4 weeks to up titrate medications and obtain BMP and BNP that day    Total duration of face to face visit time 30 minutes.  Total time spent counseling greater than fifty percent of total visit time.  Counseling included discussion regarding imaging findings, diagnosis, possibilities, treatment options, risks and benefits.  The patient had many questions regarding the options and long-term effects.

## 2020-11-06 ENCOUNTER — NURSE TRIAGE (OUTPATIENT)
Dept: ADMINISTRATIVE | Facility: CLINIC | Age: 35
End: 2020-11-06

## 2020-11-06 NOTE — TELEPHONE ENCOUNTER
Speaking to Darlyn on behalf of pt (simeon)     6/10 intermittent burning sensation in abd starting last night with nausea and HA. Pain is above umbilicus. Caller agrees she will take pt to [facility] due to PMH of HF and current symptoms.     Reason for Disposition   Pain is mainly in upper abdomen (if needed ask: 'is it mainly above the belly button?')   Pain lasting > 10 minutes and over 35 years old and at least one cardiac risk factor    Additional Information   Negative: Passed out (i.e., fainted, collapsed and was not responding)   Negative: Shock suspected (e.g., cold/pale/clammy skin, too weak to stand, low BP, rapid pulse)   Negative: Sounds like a life-threatening emergency to the triager   Negative: Passed out (i.e., fainted, collapsed and was not responding)   Negative: Shock suspected (e.g., cold/pale/clammy skin, too weak to stand, low BP, rapid pulse)   Negative: Visible sweat on face or sweat is dripping down   Negative: SEVERE abdominal pain (e.g., excruciating)   Negative: Pain lasting > 10 minutes and over 50 years old   Negative: Pain lasting > 10 minutes and over 40 years old and associated chest, arm, neck, upper back, or jaw pain    Protocols used: ABDOMINAL PAIN - MALE-A-OH, ABDOMINAL PAIN - UPPER-A-OH

## 2020-11-16 ENCOUNTER — HOSPITAL ENCOUNTER (OUTPATIENT)
Dept: MEDSURG UNIT | Facility: HOSPITAL | Age: 35
End: 2020-11-17
Attending: INTERNAL MEDICINE | Admitting: INTERNAL MEDICINE

## 2020-11-16 LAB
ABS NEUT (OLG): 5.51 X10(3)/MCL (ref 2.1–9.2)
ALBUMIN SERPL-MCNC: 3.9 GM/DL (ref 3.5–5)
ALBUMIN/GLOB SERPL: 1.3 RATIO (ref 1.1–2)
ALP SERPL-CCNC: 53 UNIT/L (ref 40–150)
ALT SERPL-CCNC: 28 UNIT/L (ref 0–55)
AST SERPL-CCNC: 33 UNIT/L (ref 5–34)
BASOPHILS # BLD AUTO: 0 X10(3)/MCL (ref 0–0.2)
BASOPHILS NFR BLD AUTO: 0 %
BILIRUB SERPL-MCNC: 1.2 MG/DL
BILIRUBIN DIRECT+TOT PNL SERPL-MCNC: 0.5 MG/DL (ref 0–0.5)
BILIRUBIN DIRECT+TOT PNL SERPL-MCNC: 0.7 MG/DL (ref 0–0.8)
BNP BLD-MCNC: 2553 PG/ML (ref 0–100)
BUN SERPL-MCNC: 18.4 MG/DL (ref 8.9–20.6)
CALCIUM SERPL-MCNC: 8.8 MG/DL (ref 8.4–10.2)
CHLORIDE SERPL-SCNC: 103 MMOL/L (ref 98–107)
CO2 SERPL-SCNC: 27 MMOL/L (ref 22–29)
CREAT SERPL-MCNC: 1.72 MG/DL (ref 0.73–1.18)
EOSINOPHIL # BLD AUTO: 0 X10(3)/MCL (ref 0–0.9)
EOSINOPHIL NFR BLD AUTO: 0 %
ERYTHROCYTE [DISTWIDTH] IN BLOOD BY AUTOMATED COUNT: 19.1 % (ref 11.5–17)
GLOBULIN SER-MCNC: 3 GM/DL (ref 2.4–3.5)
GLUCOSE SERPL-MCNC: 158 MG/DL (ref 74–100)
HCT VFR BLD AUTO: 37.4 % (ref 42–52)
HGB BLD-MCNC: 12.1 GM/DL (ref 14–18)
LIPASE SERPL-CCNC: 13 U/L
LYMPHOCYTES # BLD AUTO: 3.2 X10(3)/MCL (ref 0.6–4.6)
LYMPHOCYTES NFR BLD AUTO: 34 %
MCH RBC QN AUTO: 28.7 PG (ref 27–31)
MCHC RBC AUTO-ENTMCNC: 32.4 GM/DL (ref 33–36)
MCV RBC AUTO: 88.6 FL (ref 80–94)
MONOCYTES # BLD AUTO: 0.6 X10(3)/MCL (ref 0.1–1.3)
MONOCYTES NFR BLD AUTO: 6 %
NEUTROPHILS # BLD AUTO: 5.51 X10(3)/MCL (ref 2.1–9.2)
NEUTROPHILS NFR BLD AUTO: 59 %
PLATELET # BLD AUTO: 207 X10(3)/MCL (ref 130–400)
PMV BLD AUTO: 10.1 FL (ref 9.4–12.4)
POTASSIUM SERPL-SCNC: 3.7 MMOL/L (ref 3.5–5.1)
PROT SERPL-MCNC: 6.9 GM/DL (ref 6.4–8.3)
RBC # BLD AUTO: 4.22 X10(6)/MCL (ref 4.7–6.1)
SODIUM SERPL-SCNC: 140 MMOL/L (ref 136–145)
TROPONIN I SERPL-MCNC: 0.13 NG/ML (ref 0–0.04)
WBC # SPEC AUTO: 9.4 X10(3)/MCL (ref 4.5–11.5)

## 2020-11-17 LAB
BUN SERPL-MCNC: 19.2 MG/DL (ref 8.9–20.6)
CALCIUM SERPL-MCNC: 8.9 MG/DL (ref 8.4–10.2)
CHLORIDE SERPL-SCNC: 102 MMOL/L (ref 98–107)
CO2 SERPL-SCNC: 29 MMOL/L (ref 22–29)
CREAT SERPL-MCNC: 1.66 MG/DL (ref 0.73–1.18)
CREAT/UREA NIT SERPL: 12
GLUCOSE SERPL-MCNC: 102 MG/DL (ref 74–100)
MAGNESIUM SERPL-MCNC: 2 MG/DL (ref 1.6–2.6)
POTASSIUM SERPL-SCNC: 3.8 MMOL/L (ref 3.5–5.1)
SODIUM SERPL-SCNC: 142 MMOL/L (ref 136–145)

## 2020-12-01 ENCOUNTER — LAB VISIT (OUTPATIENT)
Dept: LAB | Facility: HOSPITAL | Age: 35
End: 2020-12-01
Attending: INTERNAL MEDICINE
Payer: MEDICAID

## 2020-12-01 ENCOUNTER — OFFICE VISIT (OUTPATIENT)
Dept: TRANSPLANT | Facility: CLINIC | Age: 35
End: 2020-12-01
Attending: INTERNAL MEDICINE
Payer: MEDICAID

## 2020-12-01 VITALS
HEIGHT: 75 IN | OXYGEN SATURATION: 99 % | DIASTOLIC BLOOD PRESSURE: 71 MMHG | HEART RATE: 107 BPM | SYSTOLIC BLOOD PRESSURE: 115 MMHG | WEIGHT: 184.75 LBS | BODY MASS INDEX: 22.97 KG/M2

## 2020-12-01 DIAGNOSIS — I50.42 CHRONIC COMBINED SYSTOLIC AND DIASTOLIC CONGESTIVE HEART FAILURE: ICD-10-CM

## 2020-12-01 DIAGNOSIS — I42.0 DILATED CARDIOMYOPATHY: ICD-10-CM

## 2020-12-01 DIAGNOSIS — F16.10 ECSTASY ABUSE: ICD-10-CM

## 2020-12-01 DIAGNOSIS — F12.20 CANNABIS USE DISORDER, SEVERE, DEPENDENCE: Chronic | ICD-10-CM

## 2020-12-01 DIAGNOSIS — Z95.810 USES LIFEVEST DEFIBRILLATOR: ICD-10-CM

## 2020-12-01 DIAGNOSIS — I50.42 CHRONIC COMBINED SYSTOLIC AND DIASTOLIC CONGESTIVE HEART FAILURE: Primary | ICD-10-CM

## 2020-12-01 DIAGNOSIS — N28.9 ACUTE ON CHRONIC RENAL INSUFFICIENCY: ICD-10-CM

## 2020-12-01 DIAGNOSIS — N18.9 ACUTE ON CHRONIC RENAL INSUFFICIENCY: ICD-10-CM

## 2020-12-01 LAB
ANION GAP SERPL CALC-SCNC: 9 MMOL/L (ref 8–16)
BNP SERPL-MCNC: 1970 PG/ML (ref 0–99)
BUN SERPL-MCNC: 26 MG/DL (ref 6–20)
CALCIUM SERPL-MCNC: 8.9 MG/DL (ref 8.7–10.5)
CHLORIDE SERPL-SCNC: 105 MMOL/L (ref 95–110)
CO2 SERPL-SCNC: 28 MMOL/L (ref 23–29)
CREAT SERPL-MCNC: 1.7 MG/DL (ref 0.5–1.4)
EST. GFR  (AFRICAN AMERICAN): 59.1 ML/MIN/1.73 M^2
EST. GFR  (NON AFRICAN AMERICAN): 51.1 ML/MIN/1.73 M^2
GLUCOSE SERPL-MCNC: 145 MG/DL (ref 70–110)
POTASSIUM SERPL-SCNC: 4.1 MMOL/L (ref 3.5–5.1)
SODIUM SERPL-SCNC: 142 MMOL/L (ref 136–145)

## 2020-12-01 PROCEDURE — 80048 BASIC METABOLIC PNL TOTAL CA: CPT

## 2020-12-01 PROCEDURE — 36415 COLL VENOUS BLD VENIPUNCTURE: CPT

## 2020-12-01 PROCEDURE — 99999 PR PBB SHADOW E&M-EST. PATIENT-LVL IV: ICD-10-PCS | Mod: PBBFAC,,, | Performed by: INTERNAL MEDICINE

## 2020-12-01 PROCEDURE — 83880 ASSAY OF NATRIURETIC PEPTIDE: CPT

## 2020-12-01 PROCEDURE — 99214 PR OFFICE/OUTPT VISIT, EST, LEVL IV, 30-39 MIN: ICD-10-PCS | Mod: S$PBB,,, | Performed by: INTERNAL MEDICINE

## 2020-12-01 PROCEDURE — 99999 PR PBB SHADOW E&M-EST. PATIENT-LVL IV: CPT | Mod: PBBFAC,,, | Performed by: INTERNAL MEDICINE

## 2020-12-01 PROCEDURE — 99214 OFFICE O/P EST MOD 30 MIN: CPT | Mod: PBBFAC | Performed by: INTERNAL MEDICINE

## 2020-12-01 PROCEDURE — 99214 OFFICE O/P EST MOD 30 MIN: CPT | Mod: S$PBB,,, | Performed by: INTERNAL MEDICINE

## 2020-12-01 RX ORDER — BUMETANIDE 2 MG/1
2 TABLET ORAL 2 TIMES DAILY
Qty: 60 TABLET | Refills: 3 | Status: SHIPPED | OUTPATIENT
Start: 2020-12-01 | End: 2022-01-03

## 2020-12-01 RX ORDER — PANTOPRAZOLE SODIUM 40 MG/1
TABLET, DELAYED RELEASE ORAL
Status: ON HOLD | COMMUNITY
Start: 2020-11-24 | End: 2022-04-25 | Stop reason: HOSPADM

## 2020-12-01 RX ORDER — DICYCLOMINE HYDROCHLORIDE 10 MG/1
CAPSULE ORAL
COMMUNITY
Start: 2020-11-24 | End: 2022-01-03

## 2020-12-01 RX ORDER — ALBUTEROL SULFATE 90 UG/1
AEROSOL, METERED RESPIRATORY (INHALATION)
Status: ON HOLD | COMMUNITY
Start: 2020-11-24 | End: 2022-12-16 | Stop reason: HOSPADM

## 2020-12-01 RX ORDER — SACUBITRIL AND VALSARTAN 49; 51 MG/1; MG/1
1 TABLET, FILM COATED ORAL 2 TIMES DAILY
Qty: 60 TABLET | Refills: 1 | Status: SHIPPED | OUTPATIENT
Start: 2020-12-01 | End: 2021-02-05 | Stop reason: SDUPTHER

## 2020-12-01 NOTE — PATIENT INSTRUCTIONS
Change furosemide to bumetanide.  Try 1 tablet twice a day but if not much response take 2 tablets once a day.  Get lab checked [facility] early Thursday morning    When able to obtain entresto 49-51 mg tablets change to this dose twice a day

## 2020-12-01 NOTE — PROGRESS NOTES
Subjective:     Patient ID:  Kevan Queen is a 35 y.o. male who presents for follow-up of Congestive Heart Failure and Shortness of Breath    HPI: 34 yo BM accompanied by Mother (her  had tx here) reports that he has given up ecstasy and all drugs with the exception of marijuana since his hospital stay  September 2020.   When seen at last visit he had not yet entered rehab but says he did in the interim.  He has only had 1 or 2 interactions with them over the Internet.  He is miss turning in his urine samples as he always seems to feel bad that day or has trouble with transportation.    He and his mother report 2 trips to the emergency room [place] since his last visit.  One he received Lasix in the emergency room and sent home on prior dose the other he was admitted overnight but still sent home on prior medications and that occurred 3 days ago.    Patient reports furosemide 20 mg does not do much on his own he has increased up to 60 mg without affect. He also reports his weight has been up as much as 9 lb over his baseline but does not have a weight log with him.    He continues with dyspnea on exertion, sleeping 2 pillows without PND.  No edema.  He has a life vest that has not alarmed or fired.    Past medical history:  Cardiomyopathy not certain if familial drug abuse or other cause; congestive heart failure    Psychiatry consulted during the hospital regarding substance abuse and the consult is available in the medical record.    Operations:  None    Social history:  Never smoke cigarettes, no significant alcohol intake, was using ecstasy up to the last admission at the end of October 2020, continues to use marijuana    Review of Systems   Constitution: Positive for malaise/fatigue. Negative for chills, fever, night sweats, weight gain and weight loss.   Cardiovascular: Positive for dyspnea on exertion and orthopnea. Negative for chest pain, irregular heartbeat, leg swelling, near-syncope,  "palpitations, paroxysmal nocturnal dyspnea and syncope.   Respiratory: Negative for cough, sputum production and wheezing.    Hematologic/Lymphatic: Does not bruise/bleed easily.   Musculoskeletal: Negative for arthritis, joint pain and stiffness.   Gastrointestinal: Negative for hematochezia and melena.   Genitourinary: Negative for hematuria.   Neurological: Positive for tremors. Negative for brief paralysis, dizziness, focal weakness, light-headedness, seizures and weakness.   Psychiatric/Behavioral: Positive for substance abuse.     Objective:   Physical Exam   Constitutional: He is oriented to person, place, and time. He appears well-developed and well-nourished.   BP (!) 106/58 (BP Location: Right arm, Patient Position: Sitting, BP Method: Medium (Automatic))   Pulse 107   Ht 6' 3" (1.905 m)   Wt 77.8 kg (171 lb 8.3 oz)   SpO2 100%   BMI 21.44 kg/m²   Thin BM in NAD   HENT:   Head: Normocephalic and atraumatic.   Eyes: Conjunctivae are normal. Right eye exhibits no discharge. Left eye exhibits no discharge. No scleral icterus.   Neck: JVD ( 16 cmIn the sitting position) present. No thyromegaly present.   Cardiovascular: Normal rate and regular rhythm. Exam reveals gallop (Soft S3 at apex).   Murmur (Grade 2/6 systolic murmur lower left sternal border and apex consistent with mitral regurgitation) heard.  Pulmonary/Chest: Effort normal and breath sounds normal.   Abdominal: Soft. Bowel sounds are normal. He exhibits no distension (Liver span 15-16 cm by percussion ) and no mass. There is no abdominal tenderness. There is no rebound and no guarding.   Musculoskeletal:         General: No tenderness or edema.   Neurological: He is alert and oriented to person, place, and time.   Skin: Skin is warm and dry.   Psychiatric: He has a normal mood and affect. His behavior is normal. Judgment and thought content normal.      Lab Results   Component Value Date    BNP 1,970 (H) 12/01/2020     (H) 11/05/2020    " BNP 1,278 (H) 10/25/2020     Lab Results   Component Value Date     12/01/2020    K 4.1 12/01/2020    MG 2.4 11/05/2020     12/01/2020    CO2 28 12/01/2020    BUN 26 (H) 12/01/2020    CREATININE 1.7 (H) 12/01/2020     (H) 12/01/2020    CALCIUM 8.9 12/01/2020    AST 34 10/30/2020    ALT 47 (H) 10/30/2020    ALBUMIN 3.1 (L) 10/30/2020    PROT 6.6 10/30/2020    BILITOT 0.4 10/30/2020     10/26/2020 Conclusion  · Severe left atrial enlargement.  · There is severe left ventricular eccentric hypertrophy.  · The left ventricle is severely enlarged with severely decreased systolic function. The estimated ejection fraction is 10%.  · There is severe left ventricular global hypokinesis.  · Grade II diastolic dysfunction.  · Mild right ventricular enlargement.  · Moderately reduced right ventricular systolic function.  · Moderate-to-severe mitral regurgitation.  · Mild to moderate tricuspid regurgitation.  · Intermediate central venous pressure (8 mmHg).  · There is pulmonary hypertension.  · The estimated PA systolic pressure is 44 mmHg.  · Trivial posterior pericardial effusion.     Assessment:     1. Chronic combined systolic and diastolic congestive heart failure    2. Acute on chronic renal insufficiency    3. Dilated cardiomyopathy    4. Uses LifeVest defibrillator    5. Ecstasy abuse    6. Cannabis use disorder, severe, dependence      Plan:   He is not a suitable transplant candidate due to his drug abuse so he is being followed by Heart failure Clinic for management in the high risk pool.   A I stressed to the mother and patient my disappointment that he is not undergone screening with the rehab program.  The 6 month time frame for which she must be drug free will begin with the 1st negative urine test in rehab.  If he misses appointments to turn in urine 1 has to assume that he still has drugs in his system hence it is critical that he go as instructed by the rehab program.    Today I changed  furosemide to bumetanide 2 mg twice daily and told him if he does not respond to the 2 mg dose today to start 4 mg once daily tonight. He will obtain BMP at [facility] on Thursday and will check on his response to therapy.  Increase Entresto 49-51 mg twice daily when insurance allows refill.   He needs to follow-up with a local cardiologist in [place]  As he clearly is not a candidate for advanced options at this time and is demonstrated unstable pattern that requires a local cardiologist direct his care as he would not be accepted in transfer here for acute decompensated heart failure.  Thus all care will be administered locally.     I am happy to see him in 4 weeks to up titrate medications and obtain BMP and BNP that day.   However recognizing the distance involved they prefer to stay local and have his cardiologist at home make these adjustments that is fine as well.

## 2020-12-04 ENCOUNTER — HISTORICAL (OUTPATIENT)
Dept: ADMINISTRATIVE | Facility: HOSPITAL | Age: 35
End: 2020-12-04

## 2020-12-04 LAB
BUN SERPL-MCNC: 28 MG/DL (ref 8.9–20.6)
CALCIUM SERPL-MCNC: 8.6 MG/DL (ref 8.4–10.2)
CHLORIDE SERPL-SCNC: 99 MMOL/L (ref 98–107)
CO2 SERPL-SCNC: 29 MMOL/L (ref 22–29)
CREAT SERPL-MCNC: 1.59 MG/DL (ref 0.73–1.18)
CREAT/UREA NIT SERPL: 18
GLUCOSE SERPL-MCNC: 109 MG/DL (ref 74–100)
POTASSIUM SERPL-SCNC: 3.6 MMOL/L (ref 3.5–5.1)
SODIUM SERPL-SCNC: 141 MMOL/L (ref 136–145)

## 2020-12-07 ENCOUNTER — TELEPHONE (OUTPATIENT)
Dept: TRANSPLANT | Facility: CLINIC | Age: 35
End: 2020-12-07

## 2020-12-07 LAB
EXT BUN: 28
EXT CALCIUM: 8.6
EXT CHLORIDE: 99
EXT CREATININE: 1.59 MG/DL
EXT GLUCOSE: 109
EXT POTASSIUM: 3.6
EXT SODIUM: 141 MMOL/L

## 2021-01-25 DIAGNOSIS — I50.42 CHRONIC COMBINED SYSTOLIC AND DIASTOLIC CONGESTIVE HEART FAILURE: ICD-10-CM

## 2021-01-25 RX ORDER — METOPROLOL SUCCINATE 25 MG/1
25 TABLET, EXTENDED RELEASE ORAL DAILY
Qty: 30 TABLET | Refills: 1 | Status: SHIPPED | OUTPATIENT
Start: 2021-01-25 | End: 2021-03-31 | Stop reason: SDUPTHER

## 2021-01-27 ENCOUNTER — HISTORICAL (OUTPATIENT)
Dept: CARDIOLOGY | Facility: HOSPITAL | Age: 36
End: 2021-01-27

## 2021-02-05 DIAGNOSIS — I50.42 CHRONIC COMBINED SYSTOLIC AND DIASTOLIC CONGESTIVE HEART FAILURE: ICD-10-CM

## 2021-02-05 RX ORDER — SACUBITRIL AND VALSARTAN 49; 51 MG/1; MG/1
1 TABLET, FILM COATED ORAL 2 TIMES DAILY
Qty: 60 TABLET | Refills: 2 | Status: SHIPPED | OUTPATIENT
Start: 2021-02-05 | End: 2021-05-14 | Stop reason: SDUPTHER

## 2021-03-31 DIAGNOSIS — I50.42 CHRONIC COMBINED SYSTOLIC AND DIASTOLIC CONGESTIVE HEART FAILURE: ICD-10-CM

## 2021-04-01 RX ORDER — METOPROLOL SUCCINATE 25 MG/1
25 TABLET, EXTENDED RELEASE ORAL DAILY
Qty: 30 TABLET | Refills: 0 | Status: SHIPPED | OUTPATIENT
Start: 2021-04-01 | End: 2021-05-19

## 2021-04-12 ENCOUNTER — HISTORICAL (OUTPATIENT)
Dept: ADMINISTRATIVE | Facility: HOSPITAL | Age: 36
End: 2021-04-12

## 2021-04-12 LAB
APPEARANCE, UA: ABNORMAL
BACTERIA #/AREA URNS AUTO: ABNORMAL /HPF
BILIRUB UR QL STRIP: NEGATIVE
COLOR UR: YELLOW
EST. AVERAGE GLUCOSE BLD GHB EST-MCNC: 148.5 MG/DL
GLUCOSE (UA): NEGATIVE
HAV IGM SERPL QL IA: NONREACTIVE
HBA1C MFR BLD: 6.8 %
HBV CORE IGM SERPL QL IA: NONREACTIVE
HBV SURFACE AG SERPL QL IA: NONREACTIVE
HCV AB SERPL QL IA: NONREACTIVE
HGB UR QL STRIP: 0.06 MG/DL
HIV 1+2 AB+HIV1 P24 AG SERPL QL IA: NONREACTIVE
HYALINE CASTS #/AREA URNS LPF: ABNORMAL /LPF
KETONES UR QL STRIP: NEGATIVE
LEUKOCYTE ESTERASE UR QL STRIP: 500 LEU/UL
NITRITE UR QL STRIP: ABNORMAL
PH UR STRIP: 6 [PH] (ref 4.5–8)
PROT UR QL STRIP: 50 MG/DL
PSA SERPL-MCNC: 6.89 NG/ML
RBC #/AREA URNS AUTO: ABNORMAL /HPF
SP GR UR STRIP: 1.01 (ref 1–1.03)
SQUAMOUS #/AREA URNS LPF: ABNORMAL /LPF
T PALLIDUM AB SER QL: NONREACTIVE
T4 FREE SERPL-MCNC: 0.95 NG/DL (ref 0.7–1.48)
TSH SERPL-ACNC: 0.69 UIU/ML (ref 0.35–4.94)
UROBILINOGEN UR STRIP-ACNC: NORMAL
WBC #/AREA URNS AUTO: ABNORMAL /HPF

## 2021-05-12 ENCOUNTER — PATIENT MESSAGE (OUTPATIENT)
Dept: RESEARCH | Facility: HOSPITAL | Age: 36
End: 2021-05-12

## 2021-06-18 ENCOUNTER — HISTORICAL (OUTPATIENT)
Dept: NEPHROLOGY | Facility: CLINIC | Age: 36
End: 2021-06-18

## 2021-06-18 LAB
ABS NEUT (OLG): 5.41 X10(3)/MCL (ref 2.1–9.2)
ALBUMIN SERPL-MCNC: 4.1 GM/DL (ref 3.5–5)
ALBUMIN/GLOB SERPL: 1.1 RATIO (ref 1.1–2)
ALP SERPL-CCNC: 87 UNIT/L (ref 40–150)
ALT SERPL-CCNC: 22 UNIT/L (ref 0–55)
APPEARANCE, UA: CLEAR
AST SERPL-CCNC: 24 UNIT/L (ref 5–34)
BACTERIA #/AREA URNS AUTO: ABNORMAL /HPF
BASOPHILS # BLD AUTO: 0 X10(3)/MCL (ref 0–0.2)
BASOPHILS NFR BLD AUTO: 0 %
BILIRUB SERPL-MCNC: 1 MG/DL
BILIRUB UR QL STRIP: NEGATIVE
BILIRUBIN DIRECT+TOT PNL SERPL-MCNC: 0.5 MG/DL (ref 0–0.5)
BILIRUBIN DIRECT+TOT PNL SERPL-MCNC: 0.5 MG/DL (ref 0–0.8)
BUN SERPL-MCNC: 29.9 MG/DL (ref 8.9–20.6)
CALCIUM SERPL-MCNC: 9.9 MG/DL (ref 8.4–10.2)
CHLORIDE SERPL-SCNC: 101 MMOL/L (ref 98–107)
CO2 SERPL-SCNC: 32 MMOL/L (ref 22–29)
COLOR UR: YELLOW
CREAT SERPL-MCNC: 2.06 MG/DL (ref 0.73–1.18)
CREAT UR-MCNC: 162 MG/DL (ref 58–161)
DEPRECATED CALCIDIOL+CALCIFEROL SERPL-MC: 17.8 NG/ML (ref 30–80)
EOSINOPHIL # BLD AUTO: 0.1 X10(3)/MCL (ref 0–0.9)
EOSINOPHIL NFR BLD AUTO: 1 %
ERYTHROCYTE [DISTWIDTH] IN BLOOD BY AUTOMATED COUNT: 16.5 % (ref 11.5–14.5)
GLOBULIN SER-MCNC: 3.8 GM/DL (ref 2.4–3.5)
GLUCOSE (UA): NEGATIVE
GLUCOSE SERPL-MCNC: 93 MG/DL (ref 74–100)
HCT VFR BLD AUTO: 44.1 % (ref 40–51)
HGB BLD-MCNC: 14.8 GM/DL (ref 13.5–17.5)
HGB UR QL STRIP: NEGATIVE
HYALINE CASTS #/AREA URNS LPF: ABNORMAL /LPF
IMM GRANULOCYTES # BLD AUTO: 0.02 10*3/UL
IMM GRANULOCYTES NFR BLD AUTO: 0 %
KETONES UR QL STRIP: NEGATIVE
LEUKOCYTE ESTERASE UR QL STRIP: 75 LEU/UL
LYMPHOCYTES # BLD AUTO: 2.4 X10(3)/MCL (ref 0.6–4.6)
LYMPHOCYTES NFR BLD AUTO: 28 %
MCH RBC QN AUTO: 29.9 PG (ref 26–34)
MCHC RBC AUTO-ENTMCNC: 33.6 GM/DL (ref 31–37)
MCV RBC AUTO: 89.1 FL (ref 80–100)
MONOCYTES # BLD AUTO: 0.7 X10(3)/MCL (ref 0.1–1.3)
MONOCYTES NFR BLD AUTO: 8 %
NEUTROPHILS # BLD AUTO: 5.41 X10(3)/MCL (ref 2.1–9.2)
NEUTROPHILS NFR BLD AUTO: 63 %
NITRITE UR QL STRIP: NEGATIVE
NRBC BLD AUTO-RTO: 0 % (ref 0–0.2)
PH UR STRIP: 5.5 [PH] (ref 4.5–8)
PLATELET # BLD AUTO: 255 X10(3)/MCL (ref 130–400)
PMV BLD AUTO: 9.6 FL (ref 7.4–10.4)
POTASSIUM SERPL-SCNC: 3.1 MMOL/L (ref 3.5–5.1)
PROT SERPL-MCNC: 7.9 GM/DL (ref 6.4–8.3)
PROT UR QL STRIP: 30 MG/DL
PROT UR STRIP-MCNC: 36 MG/DL
PROT/CREAT UR-RTO: 222.2 MG/GM CR
PTH-INTACT SERPL-MCNC: 186.5 PG/ML (ref 8.7–77)
RBC # BLD AUTO: 4.95 X10(6)/MCL (ref 4.5–5.9)
RBC #/AREA URNS AUTO: ABNORMAL /HPF
SODIUM SERPL-SCNC: 143 MMOL/L (ref 136–145)
SP GR UR STRIP: 1.01 (ref 1–1.03)
SQUAMOUS #/AREA URNS LPF: ABNORMAL /LPF
URATE SERPL-MCNC: 9.9 MG/DL (ref 3.5–7.2)
UROBILINOGEN UR STRIP-ACNC: NORMAL
WBC # SPEC AUTO: 8.6 X10(3)/MCL (ref 4.5–11)
WBC #/AREA URNS AUTO: ABNORMAL /HPF

## 2021-06-20 LAB — FINAL CULTURE: NO GROWTH

## 2021-08-30 ENCOUNTER — HOSPITAL ENCOUNTER (OUTPATIENT)
Dept: EMERGENCY MEDICINE | Facility: HOSPITAL | Age: 36
End: 2021-08-31
Attending: STUDENT IN AN ORGANIZED HEALTH CARE EDUCATION/TRAINING PROGRAM | Admitting: STUDENT IN AN ORGANIZED HEALTH CARE EDUCATION/TRAINING PROGRAM

## 2021-08-30 LAB
ABS NEUT (OLG): 5.24 X10(3)/MCL (ref 2.1–9.2)
ALBUMIN SERPL-MCNC: 3.9 GM/DL (ref 3.5–5)
ALBUMIN/GLOB SERPL: 1.1 RATIO (ref 1.1–2)
ALP SERPL-CCNC: 86 UNIT/L (ref 40–150)
ALT SERPL-CCNC: 27 UNIT/L (ref 0–55)
APPEARANCE, UA: CLEAR
AST SERPL-CCNC: 36 UNIT/L (ref 5–34)
BACTERIA #/AREA URNS AUTO: ABNORMAL /HPF
BASOPHILS # BLD AUTO: 0 X10(3)/MCL (ref 0–0.2)
BASOPHILS NFR BLD AUTO: 0 %
BILIRUB SERPL-MCNC: 0.9 MG/DL
BILIRUB UR QL STRIP: NEGATIVE
BILIRUBIN DIRECT+TOT PNL SERPL-MCNC: 0.4 MG/DL (ref 0–0.5)
BILIRUBIN DIRECT+TOT PNL SERPL-MCNC: 0.5 MG/DL (ref 0–0.8)
BNP BLD-MCNC: 3522.8 PG/ML
BUN SERPL-MCNC: 30.9 MG/DL (ref 8.9–20.6)
CALCIUM SERPL-MCNC: 9.8 MG/DL (ref 8.4–10.2)
CHLORIDE SERPL-SCNC: 101 MMOL/L (ref 98–107)
CK MB SERPL-MCNC: 1.2 NG/ML
CK SERPL-CCNC: 234 U/L (ref 30–200)
CO2 SERPL-SCNC: 28 MMOL/L (ref 22–29)
COLOR UR: ABNORMAL
CREAT SERPL-MCNC: 3.04 MG/DL (ref 0.73–1.18)
EOSINOPHIL # BLD AUTO: 0.1 X10(3)/MCL (ref 0–0.9)
EOSINOPHIL NFR BLD AUTO: 1 %
ERYTHROCYTE [DISTWIDTH] IN BLOOD BY AUTOMATED COUNT: 15.2 % (ref 11.5–14.5)
GLOBULIN SER-MCNC: 3.7 GM/DL (ref 2.4–3.5)
GLUCOSE (UA): NEGATIVE
GLUCOSE SERPL-MCNC: 93 MG/DL (ref 74–100)
HCT VFR BLD AUTO: 39.2 % (ref 40–51)
HGB BLD-MCNC: 13.6 GM/DL (ref 13.5–17.5)
HGB UR QL STRIP: NEGATIVE
HYALINE CASTS #/AREA URNS LPF: ABNORMAL /LPF
IMM GRANULOCYTES # BLD AUTO: 0.02 10*3/UL
IMM GRANULOCYTES NFR BLD AUTO: 0 %
KETONES UR QL STRIP: NEGATIVE
LEUKOCYTE ESTERASE UR QL STRIP: NEGATIVE
LYMPHOCYTES # BLD AUTO: 2.4 X10(3)/MCL (ref 0.6–4.6)
LYMPHOCYTES NFR BLD AUTO: 28 %
MCH RBC QN AUTO: 29.8 PG (ref 26–34)
MCHC RBC AUTO-ENTMCNC: 34.7 GM/DL (ref 31–37)
MCV RBC AUTO: 85.8 FL (ref 80–100)
MONOCYTES # BLD AUTO: 0.7 X10(3)/MCL (ref 0.1–1.3)
MONOCYTES NFR BLD AUTO: 8 %
NEUTROPHILS # BLD AUTO: 5.24 X10(3)/MCL (ref 2.1–9.2)
NEUTROPHILS NFR BLD AUTO: 62 %
NITRITE UR QL STRIP: NEGATIVE
NRBC BLD AUTO-RTO: 0 % (ref 0–0.2)
PH UR STRIP: 5.5 [PH] (ref 4.5–8)
PLATELET # BLD AUTO: 180 X10(3)/MCL (ref 130–400)
PMV BLD AUTO: 10.5 FL (ref 7.4–10.4)
POTASSIUM SERPL-SCNC: 3.9 MMOL/L (ref 3.5–5.1)
PROT SERPL-MCNC: 7.6 GM/DL (ref 6.4–8.3)
PROT UR QL STRIP: NEGATIVE
RBC # BLD AUTO: 4.57 X10(6)/MCL (ref 4.5–5.9)
RBC #/AREA URNS AUTO: ABNORMAL /HPF
SARS-COV-2 AG RESP QL IA.RAPID: NEGATIVE
SODIUM SERPL-SCNC: 139 MMOL/L (ref 136–145)
SP GR UR STRIP: 1 (ref 1–1.03)
SQUAMOUS #/AREA URNS LPF: ABNORMAL /LPF
TROPONIN I SERPL-MCNC: 0.07 NG/ML (ref 0–0.04)
UROBILINOGEN UR STRIP-ACNC: NORMAL
WBC # SPEC AUTO: 8.5 X10(3)/MCL (ref 4.5–11)
WBC #/AREA URNS AUTO: ABNORMAL /HPF

## 2021-08-31 LAB
ABS NEUT (OLG): 4.68 X10(3)/MCL (ref 2.1–9.2)
ALBUMIN SERPL-MCNC: 3.6 GM/DL (ref 3.5–5)
ALBUMIN/GLOB SERPL: 1 RATIO (ref 1.1–2)
ALP SERPL-CCNC: 82 UNIT/L (ref 40–150)
ALT SERPL-CCNC: 24 UNIT/L (ref 0–55)
AMPHET UR QL SCN: NEGATIVE
AST SERPL-CCNC: 27 UNIT/L (ref 5–34)
BARBITURATE SCN PRESENT UR: NEGATIVE
BASOPHILS # BLD AUTO: 0 X10(3)/MCL (ref 0–0.2)
BASOPHILS NFR BLD AUTO: 0 %
BENZODIAZ UR QL SCN: NEGATIVE
BILIRUB SERPL-MCNC: 1 MG/DL
BILIRUBIN DIRECT+TOT PNL SERPL-MCNC: 0.4 MG/DL (ref 0–0.5)
BILIRUBIN DIRECT+TOT PNL SERPL-MCNC: 0.6 MG/DL (ref 0–0.8)
BUN SERPL-MCNC: 31.1 MG/DL (ref 8.9–20.6)
CALCIUM SERPL-MCNC: 9.1 MG/DL (ref 8.4–10.2)
CANNABINOIDS UR QL SCN: POSITIVE
CHLORIDE SERPL-SCNC: 100 MMOL/L (ref 98–107)
CO2 SERPL-SCNC: 30 MMOL/L (ref 22–29)
COCAINE UR QL SCN: POSITIVE
CREAT SERPL-MCNC: 2.6 MG/DL (ref 0.73–1.18)
EOSINOPHIL # BLD AUTO: 0.1 X10(3)/MCL (ref 0–0.9)
EOSINOPHIL NFR BLD AUTO: 1 %
ERYTHROCYTE [DISTWIDTH] IN BLOOD BY AUTOMATED COUNT: 15.1 % (ref 11.5–14.5)
FERRITIN SERPL-MCNC: 119.39 NG/ML (ref 21.81–274.66)
GLOBULIN SER-MCNC: 3.6 GM/DL (ref 2.4–3.5)
GLUCOSE SERPL-MCNC: 126 MG/DL (ref 74–100)
HCT VFR BLD AUTO: 37.2 % (ref 40–51)
HGB BLD-MCNC: 12.9 GM/DL (ref 13.5–17.5)
IMM GRANULOCYTES # BLD AUTO: 0.02 10*3/UL
IMM GRANULOCYTES NFR BLD AUTO: 0 %
IRON SATN MFR SERPL: 28 % (ref 20–50)
IRON SERPL-MCNC: 101 UG/DL (ref 65–175)
LIPASE SERPL-CCNC: 28 U/L
LYMPHOCYTES # BLD AUTO: 2.8 X10(3)/MCL (ref 0.6–4.6)
LYMPHOCYTES NFR BLD AUTO: 34 %
MAGNESIUM SERPL-MCNC: 1.6 MG/DL (ref 1.6–2.6)
MAGNESIUM SERPL-MCNC: 1.8 MG/DL (ref 1.6–2.6)
MCH RBC QN AUTO: 29.7 PG (ref 26–34)
MCHC RBC AUTO-ENTMCNC: 34.7 GM/DL (ref 31–37)
MCV RBC AUTO: 85.5 FL (ref 80–100)
MONOCYTES # BLD AUTO: 0.5 X10(3)/MCL (ref 0.1–1.3)
MONOCYTES NFR BLD AUTO: 6 %
NEUTROPHILS # BLD AUTO: 4.68 X10(3)/MCL (ref 2.1–9.2)
NEUTROPHILS NFR BLD AUTO: 58 %
NRBC BLD AUTO-RTO: 0 % (ref 0–0.2)
OPIATES UR QL SCN: NEGATIVE
PCP UR QL: NEGATIVE
PH UR STRIP.AUTO: 5.5 [PH] (ref 3–11)
PHOSPHATE SERPL-MCNC: 4.2 MG/DL (ref 2.3–4.7)
PLATELET # BLD AUTO: 157 X10(3)/MCL (ref 130–400)
PMV BLD AUTO: 9.9 FL (ref 7.4–10.4)
POTASSIUM SERPL-SCNC: 3.6 MMOL/L (ref 3.5–5.1)
PROT SERPL-MCNC: 7.2 GM/DL (ref 6.4–8.3)
RBC # BLD AUTO: 4.35 X10(6)/MCL (ref 4.5–5.9)
SODIUM SERPL-SCNC: 141 MMOL/L (ref 136–145)
TIBC SERPL-MCNC: 264 UG/DL (ref 69–240)
TIBC SERPL-MCNC: 365 UG/DL (ref 250–450)
TRANSFERRIN SERPL-MCNC: 324 MG/DL (ref 174–364)
TROPONIN I SERPL-MCNC: 0.06 NG/ML (ref 0–0.04)
TROPONIN I SERPL-MCNC: 0.06 NG/ML (ref 0–0.04)
TSH SERPL-ACNC: 3.38 UIU/ML (ref 0.35–4.94)
WBC # SPEC AUTO: 8 X10(3)/MCL (ref 4.5–11)

## 2021-09-26 ENCOUNTER — HOSPITAL ENCOUNTER (OUTPATIENT)
Dept: EMERGENCY MEDICINE | Facility: HOSPITAL | Age: 36
End: 2021-09-27
Attending: INTERNAL MEDICINE | Admitting: INTERNAL MEDICINE

## 2021-09-26 LAB
ABS NEUT (OLG): 5.71 X10(3)/MCL (ref 2.1–9.2)
ALBUMIN SERPL-MCNC: 3.7 GM/DL (ref 3.5–5)
ALBUMIN/GLOB SERPL: 1.2 RATIO (ref 1.1–2)
ALP SERPL-CCNC: 77 UNIT/L (ref 40–150)
ALT SERPL-CCNC: 24 UNIT/L (ref 0–55)
AST SERPL-CCNC: 48 UNIT/L (ref 5–34)
BASOPHILS # BLD AUTO: 0 X10(3)/MCL (ref 0–0.2)
BASOPHILS NFR BLD AUTO: 0 %
BILIRUB SERPL-MCNC: 2.4 MG/DL
BILIRUBIN DIRECT+TOT PNL SERPL-MCNC: 1 MG/DL (ref 0–0.8)
BILIRUBIN DIRECT+TOT PNL SERPL-MCNC: 1.4 MG/DL (ref 0–0.5)
BNP BLD-MCNC: >5000 PG/ML (ref 0–100)
BUN SERPL-MCNC: 32.8 MG/DL (ref 8.9–20.6)
CALCIUM SERPL-MCNC: 9.4 MG/DL (ref 8.4–10.2)
CHLORIDE SERPL-SCNC: 100 MMOL/L (ref 98–107)
CO2 SERPL-SCNC: 19 MMOL/L (ref 22–29)
CREAT SERPL-MCNC: 2.3 MG/DL (ref 0.73–1.18)
EOSINOPHIL # BLD AUTO: 0 X10(3)/MCL (ref 0–0.9)
EOSINOPHIL NFR BLD AUTO: 0 %
ERYTHROCYTE [DISTWIDTH] IN BLOOD BY AUTOMATED COUNT: 18.3 % (ref 11.5–17)
GLOBULIN SER-MCNC: 3.2 GM/DL (ref 2.4–3.5)
GLUCOSE SERPL-MCNC: 149 MG/DL (ref 74–100)
HCT VFR BLD AUTO: 36.6 % (ref 42–52)
HGB BLD-MCNC: 11.7 GM/DL (ref 14–18)
LIPASE SERPL-CCNC: 16 U/L
LYMPHOCYTES # BLD AUTO: 1.3 X10(3)/MCL (ref 0.6–4.6)
LYMPHOCYTES NFR BLD AUTO: 17 %
MCH RBC QN AUTO: 29 PG (ref 27–31)
MCHC RBC AUTO-ENTMCNC: 32 GM/DL (ref 33–36)
MCV RBC AUTO: 90.8 FL (ref 80–94)
MONOCYTES # BLD AUTO: 0.6 X10(3)/MCL (ref 0.1–1.3)
MONOCYTES NFR BLD AUTO: 8 %
NEUTROPHILS # BLD AUTO: 5.71 X10(3)/MCL (ref 2.1–9.2)
NEUTROPHILS NFR BLD AUTO: 75 %
PLATELET # BLD AUTO: 260 X10(3)/MCL (ref 130–400)
PMV BLD AUTO: 10.8 FL (ref 9.4–12.4)
POC TROPONIN: 0.02 NG/ML (ref 0–0.08)
POTASSIUM SERPL-SCNC: 4.1 MMOL/L (ref 3.5–5.1)
PROT SERPL-MCNC: 6.9 GM/DL (ref 6.4–8.3)
RBC # BLD AUTO: 4.03 X10(6)/MCL (ref 4.7–6.1)
SARS-COV-2 AG RESP QL IA.RAPID: NEGATIVE
SODIUM SERPL-SCNC: 136 MMOL/L (ref 136–145)
TROPONIN I SERPL-MCNC: 0.06 NG/ML (ref 0–0.04)
WBC # SPEC AUTO: 7.6 X10(3)/MCL (ref 4.5–11.5)

## 2021-09-27 LAB
ABS NEUT (OLG): 4.58 X10(3)/MCL (ref 2.1–9.2)
ALBUMIN SERPL-MCNC: 3.6 GM/DL (ref 3.5–5)
ALBUMIN/GLOB SERPL: 1 RATIO (ref 1.1–2)
ALP SERPL-CCNC: 71 UNIT/L (ref 40–150)
ALT SERPL-CCNC: 25 UNIT/L (ref 0–55)
AMPHET UR QL SCN: NEGATIVE
AST SERPL-CCNC: 45 UNIT/L (ref 5–34)
BARBITURATE SCN PRESENT UR: NEGATIVE
BASOPHILS # BLD AUTO: 0 X10(3)/MCL (ref 0–0.2)
BASOPHILS NFR BLD AUTO: 1 %
BENZODIAZ UR QL SCN: NEGATIVE
BILIRUB SERPL-MCNC: 2.3 MG/DL
BILIRUBIN DIRECT+TOT PNL SERPL-MCNC: 0.9 MG/DL (ref 0–0.8)
BILIRUBIN DIRECT+TOT PNL SERPL-MCNC: 1.4 MG/DL (ref 0–0.5)
BUN SERPL-MCNC: 32.9 MG/DL (ref 8.9–20.6)
CALCIUM SERPL-MCNC: 9.5 MG/DL (ref 8.4–10.2)
CANNABINOIDS UR QL SCN: POSITIVE
CHLORIDE SERPL-SCNC: 99 MMOL/L (ref 98–107)
CO2 SERPL-SCNC: 23 MMOL/L (ref 22–29)
COCAINE UR QL SCN: NEGATIVE
CREAT SERPL-MCNC: 2.29 MG/DL (ref 0.73–1.18)
EOSINOPHIL # BLD AUTO: 0 X10(3)/MCL (ref 0–0.9)
EOSINOPHIL NFR BLD AUTO: 1 %
ERYTHROCYTE [DISTWIDTH] IN BLOOD BY AUTOMATED COUNT: 18.6 % (ref 11.5–17)
GLOBULIN SER-MCNC: 3.5 GM/DL (ref 2.4–3.5)
GLUCOSE SERPL-MCNC: 118 MG/DL (ref 74–100)
HCT VFR BLD AUTO: 35.9 % (ref 42–52)
HGB BLD-MCNC: 11.9 GM/DL (ref 14–18)
LYMPHOCYTES # BLD AUTO: 2.8 X10(3)/MCL (ref 0.6–4.6)
LYMPHOCYTES NFR BLD AUTO: 33 %
MCH RBC QN AUTO: 29.2 PG (ref 27–31)
MCHC RBC AUTO-ENTMCNC: 33.1 GM/DL (ref 33–36)
MCV RBC AUTO: 88 FL (ref 80–94)
MONOCYTES # BLD AUTO: 1.1 X10(3)/MCL (ref 0.1–1.3)
MONOCYTES NFR BLD AUTO: 12 %
NEUTROPHILS # BLD AUTO: 4.58 X10(3)/MCL (ref 2.1–9.2)
NEUTROPHILS NFR BLD AUTO: 53 %
OPIATES UR QL SCN: POSITIVE
PCP UR QL: NEGATIVE
PH UR STRIP.AUTO: 6 [PH] (ref 5–7.5)
PLATELET # BLD AUTO: 245 X10(3)/MCL (ref 130–400)
PMV BLD AUTO: 10.2 FL (ref 9.4–12.4)
POTASSIUM SERPL-SCNC: 3.5 MMOL/L (ref 3.5–5.1)
PROT SERPL-MCNC: 7.1 GM/DL (ref 6.4–8.3)
RBC # BLD AUTO: 4.08 X10(6)/MCL (ref 4.7–6.1)
SODIUM SERPL-SCNC: 134 MMOL/L (ref 136–145)
SP GR FLD REFRACTOMETRY: 1.02 (ref 1–1.03)
WBC # SPEC AUTO: 8.6 X10(3)/MCL (ref 4.5–11.5)

## 2021-10-18 ENCOUNTER — PATIENT MESSAGE (OUTPATIENT)
Dept: TRANSPLANT | Facility: CLINIC | Age: 36
End: 2021-10-18
Payer: MEDICAID

## 2021-11-11 NOTE — ASSESSMENT & PLAN NOTE
"Patient with newly diagnosed cardiomyopathy and HFpEF(EF 10%; wearing life vest). Presenting with Acute Heart Failure Exacerbation, c/o abd pain and SOB. On initial exam patient with 's, SBP 90s, and SpO2 100% when O2 turned off. Patient had JVD present and bibasilar rales. Peripheral edema was NOT present in his LEs. BNP 1278, Trop 0.132, AST/ALT 52/67, ALP 53. CXR w/ "no acute findings." Bibasilar rales has resolved and still no signs of peripheral edema. Patient improving with aggressive diuresis as patient has UOP of 9L since admission. TTE shows severe left atrial enlargement, moderate to severe MR, grade 2 diastolic dysfunction and EF 10%. Encourage patient to wear lifevest and avoid polysubstance abuse. Patient has had net output of 10L and down 10 kg in weight since admission.     Plan  - Metoprolol 12.5 mg daily, lisinopril 2.5 daily with up titration to BID  - Outpatient heart failure/transplant evaluation in 2 weeks  - lasix PRN  - 2L fluid restriction   - Stirct I/Os  - Daily Standing weights         " Spoke with patient and scheduled [facility] FU for 11/15

## 2021-12-02 ENCOUNTER — HISTORICAL (OUTPATIENT)
Dept: ADMINISTRATIVE | Facility: HOSPITAL | Age: 36
End: 2021-12-02

## 2021-12-02 LAB
ABS NEUT (OLG): 2.96 X10(3)/MCL (ref 2.1–9.2)
ALBUMIN SERPL-MCNC: 3.3 GM/DL (ref 3.5–5)
ALBUMIN/GLOB SERPL: 0.9 RATIO (ref 1.1–2)
ALP SERPL-CCNC: 116 UNIT/L (ref 40–150)
ALT SERPL-CCNC: 13 UNIT/L (ref 0–55)
AST SERPL-CCNC: 21 UNIT/L (ref 5–34)
BASOPHILS # BLD AUTO: 0 X10(3)/MCL (ref 0–0.2)
BASOPHILS NFR BLD AUTO: 1 %
BILIRUB SERPL-MCNC: 1.8 MG/DL
BILIRUBIN DIRECT+TOT PNL SERPL-MCNC: 0.7 MG/DL (ref 0–0.8)
BILIRUBIN DIRECT+TOT PNL SERPL-MCNC: 1.1 MG/DL (ref 0–0.5)
BNP BLD-MCNC: 1287.6 PG/ML
BUN SERPL-MCNC: 29.6 MG/DL (ref 8.9–20.6)
CALCIUM SERPL-MCNC: 9.3 MG/DL (ref 8.7–10.5)
CHLORIDE SERPL-SCNC: 95 MMOL/L (ref 98–107)
CO2 SERPL-SCNC: 36 MMOL/L (ref 22–29)
CREAT SERPL-MCNC: 2.04 MG/DL (ref 0.73–1.18)
EOSINOPHIL # BLD AUTO: 0.1 X10(3)/MCL (ref 0–0.9)
EOSINOPHIL NFR BLD AUTO: 2 %
ERYTHROCYTE [DISTWIDTH] IN BLOOD BY AUTOMATED COUNT: 20.7 % (ref 11.5–17)
GLOBULIN SER-MCNC: 3.6 GM/DL (ref 2.4–3.5)
GLUCOSE SERPL-MCNC: 109 MG/DL (ref 74–100)
HCT VFR BLD AUTO: 44 % (ref 42–52)
HGB BLD-MCNC: 15 GM/DL (ref 14–18)
LYMPHOCYTES # BLD AUTO: 2.6 X10(3)/MCL (ref 0.6–4.6)
LYMPHOCYTES NFR BLD AUTO: 41 %
MAGNESIUM SERPL-MCNC: 1.8 MG/DL (ref 1.6–2.6)
MCH RBC QN AUTO: 28.7 PG (ref 27–31)
MCHC RBC AUTO-ENTMCNC: 34.1 GM/DL (ref 33–36)
MCV RBC AUTO: 84.3 FL (ref 80–94)
MONOCYTES # BLD AUTO: 0.7 X10(3)/MCL (ref 0.1–1.3)
MONOCYTES NFR BLD AUTO: 11 %
NEUTROPHILS # BLD AUTO: 2.96 X10(3)/MCL (ref 2.1–9.2)
NEUTROPHILS NFR BLD AUTO: 46 %
PLATELET # BLD AUTO: 236 X10(3)/MCL (ref 130–400)
PMV BLD AUTO: 9.6 FL (ref 9.4–12.4)
POTASSIUM SERPL-SCNC: 3.4 MMOL/L (ref 3.5–5.1)
PROT SERPL-MCNC: 6.9 GM/DL (ref 6.4–8.3)
RBC # BLD AUTO: 5.22 X10(6)/MCL (ref 4.7–6.1)
SODIUM SERPL-SCNC: 142 MMOL/L (ref 136–145)
WBC # SPEC AUTO: 6.4 X10(3)/MCL (ref 4.5–11.5)

## 2022-01-03 ENCOUNTER — OFFICE VISIT (OUTPATIENT)
Dept: TRANSPLANT | Facility: CLINIC | Age: 37
End: 2022-01-03
Payer: MEDICAID

## 2022-01-03 VITALS
HEIGHT: 78 IN | HEART RATE: 98 BPM | SYSTOLIC BLOOD PRESSURE: 99 MMHG | WEIGHT: 177.25 LBS | DIASTOLIC BLOOD PRESSURE: 58 MMHG | BODY MASS INDEX: 20.51 KG/M2

## 2022-01-03 DIAGNOSIS — I42.0 DILATED CARDIOMYOPATHY: ICD-10-CM

## 2022-01-03 DIAGNOSIS — I50.42 CHRONIC COMBINED SYSTOLIC AND DIASTOLIC CONGESTIVE HEART FAILURE: ICD-10-CM

## 2022-01-03 DIAGNOSIS — F41.1 GENERALIZED ANXIETY DISORDER: Primary | ICD-10-CM

## 2022-01-03 PROCEDURE — 3078F DIAST BP <80 MM HG: CPT | Mod: CPTII,,, | Performed by: NURSE PRACTITIONER

## 2022-01-03 PROCEDURE — 3074F SYST BP LT 130 MM HG: CPT | Mod: CPTII,,, | Performed by: NURSE PRACTITIONER

## 2022-01-03 PROCEDURE — 3008F BODY MASS INDEX DOCD: CPT | Mod: CPTII,,, | Performed by: NURSE PRACTITIONER

## 2022-01-03 PROCEDURE — 3074F PR MOST RECENT SYSTOLIC BLOOD PRESSURE < 130 MM HG: ICD-10-PCS | Mod: CPTII,,, | Performed by: NURSE PRACTITIONER

## 2022-01-03 PROCEDURE — 4010F PR ACE/ARB THEARPY RXD/TAKEN: ICD-10-PCS | Mod: CPTII,,, | Performed by: NURSE PRACTITIONER

## 2022-01-03 PROCEDURE — 99213 PR OFFICE/OUTPT VISIT, EST, LEVL III, 20-29 MIN: ICD-10-PCS | Mod: S$PBB,,, | Performed by: NURSE PRACTITIONER

## 2022-01-03 PROCEDURE — 1159F MED LIST DOCD IN RCRD: CPT | Mod: CPTII,,, | Performed by: NURSE PRACTITIONER

## 2022-01-03 PROCEDURE — 4010F ACE/ARB THERAPY RXD/TAKEN: CPT | Mod: CPTII,,, | Performed by: NURSE PRACTITIONER

## 2022-01-03 PROCEDURE — 99999 PR PBB SHADOW E&M-EST. PATIENT-LVL III: ICD-10-PCS | Mod: PBBFAC,,, | Performed by: NURSE PRACTITIONER

## 2022-01-03 PROCEDURE — 3078F PR MOST RECENT DIASTOLIC BLOOD PRESSURE < 80 MM HG: ICD-10-PCS | Mod: CPTII,,, | Performed by: NURSE PRACTITIONER

## 2022-01-03 PROCEDURE — 99999 PR PBB SHADOW E&M-EST. PATIENT-LVL III: CPT | Mod: PBBFAC,,, | Performed by: NURSE PRACTITIONER

## 2022-01-03 PROCEDURE — 99213 OFFICE O/P EST LOW 20 MIN: CPT | Mod: PBBFAC | Performed by: NURSE PRACTITIONER

## 2022-01-03 PROCEDURE — 1159F PR MEDICATION LIST DOCUMENTED IN MEDICAL RECORD: ICD-10-PCS | Mod: CPTII,,, | Performed by: NURSE PRACTITIONER

## 2022-01-03 PROCEDURE — 3008F PR BODY MASS INDEX (BMI) DOCUMENTED: ICD-10-PCS | Mod: CPTII,,, | Performed by: NURSE PRACTITIONER

## 2022-01-03 PROCEDURE — 99213 OFFICE O/P EST LOW 20 MIN: CPT | Mod: S$PBB,,, | Performed by: NURSE PRACTITIONER

## 2022-01-03 RX ORDER — MILRINONE LACTATE 1 MG/ML
INJECTION INTRAVENOUS
Status: ON HOLD | COMMUNITY
Start: 2021-12-30 | End: 2022-04-25 | Stop reason: HOSPADM

## 2022-01-03 RX ORDER — SPIRONOLACTONE 25 MG/1
12.5 TABLET ORAL DAILY
Status: ON HOLD | COMMUNITY
Start: 2021-12-28 | End: 2022-04-25 | Stop reason: HOSPADM

## 2022-01-03 RX ORDER — MIRTAZAPINE 15 MG/1
15 TABLET, FILM COATED ORAL NIGHTLY
COMMUNITY
Start: 2021-12-09 | End: 2022-08-04 | Stop reason: SDUPTHER

## 2022-01-03 RX ORDER — PROCHLORPERAZINE MALEATE 5 MG
5 TABLET ORAL EVERY 6 HOURS PRN
Status: ON HOLD | COMMUNITY
Start: 2021-12-06 | End: 2022-05-27 | Stop reason: HOSPADM

## 2022-01-03 RX ORDER — TRAMADOL HYDROCHLORIDE 50 MG/1
50 TABLET ORAL EVERY 6 HOURS PRN
Status: ON HOLD | COMMUNITY
Start: 2021-12-28 | End: 2022-05-27 | Stop reason: HOSPADM

## 2022-01-03 RX ORDER — METOLAZONE 2.5 MG/1
2.5 TABLET ORAL EVERY OTHER DAY
COMMUNITY
Start: 2021-11-25 | End: 2022-04-25

## 2022-01-03 RX ORDER — SODIUM CHLORIDE 0.9 % (FLUSH) 0.9 %
SYRINGE (ML) INJECTION
Status: ON HOLD | COMMUNITY
Start: 2021-11-25 | End: 2022-04-25 | Stop reason: HOSPADM

## 2022-01-03 RX ORDER — NAPROXEN SODIUM 220 MG/1
81 TABLET, FILM COATED ORAL DAILY
Status: ON HOLD | COMMUNITY
Start: 2021-12-07 | End: 2022-06-21 | Stop reason: ALTCHOICE

## 2022-01-03 RX ORDER — FUROSEMIDE 80 MG/1
80 TABLET ORAL 2 TIMES DAILY
Status: ON HOLD | COMMUNITY
Start: 2021-11-05 | End: 2022-07-27 | Stop reason: HOSPADM

## 2022-01-03 NOTE — PROGRESS NOTES
Subjective:    Patient ID:  Kevan Queen is a 36 y.o. male who presents for follow-up of Congestive Heart Failure    HPI 37 yo BM with NICM, on home milrinone, hx of substance abuse who comes in for f/u. States that he has been doing well since last visit with Dr Lopez.Sleeps on 2 pillows. No PND anymore. Volume/weight has been stable. Great appetite. He did have to have his PICC line replaced as it got pulled out during sleep. He was also transitioned to Furosemide from bumex(he said he developed a rash). He is scheduled to have ICD placed later this month in [place]. Lots of good questions today regarding transplant/LVAD candidacy. He states that he has remained sober and plans to f/u with psy next week. Let him know that once he demonstrates his commitment to his health and continues to f/u regularly that we can consider working him up.     Review of Systems   Constitutional: Negative.   HENT: Negative.    Eyes: Negative.    Cardiovascular: Negative.    Respiratory: Negative.    Endocrine: Negative.    Hematologic/Lymphatic: Negative.    Skin: Negative.    Musculoskeletal: Negative.         Objective:    Physical Exam  Vitals and nursing note reviewed.   Constitutional:       Appearance: He is well-developed and well-nourished.   HENT:      Head: Normocephalic.   Eyes:      Conjunctiva/sclera: Conjunctivae normal.      Pupils: Pupils are equal, round, and reactive to light.   Neck:      Vascular: No JVD.   Cardiovascular:      Rate and Rhythm: Normal rate and regular rhythm.      Heart sounds: Murmur heard.       Pulmonary:      Effort: Pulmonary effort is normal.      Breath sounds: Normal breath sounds.   Abdominal:      General: Bowel sounds are normal.      Palpations: Abdomen is soft.   Musculoskeletal:         General: Normal range of motion.      Cervical back: Normal range of motion and neck supple.      Comments: PICC CDI   Skin:     General: Skin is warm and dry.   Neurological:      Mental  Status: He is alert and oriented to person, place, and time.   Psychiatric:         Mood and Affect: Mood and affect normal.           Assessment:       1. Generalized anxiety disorder    2. Dilated cardiomyopathy    3. Chronic combined systolic and diastolic congestive heart failure         Plan:       Continue current HF regimen. Pt also follows with Dr Ruggiero in [place].   Let him know that he will need to f/u with PSY as planned.  Can consider moving PICC to tunneled position if it continues to give him issues.   Recommend 2 gram sodium restriction and 1500cc fluid restriction.   Encourage physical activity with graded exercise program.   Requested patient to weigh themselves daily, and to notify us if their weight increases by more than 3 lbs in 1 day or 5 lbs in 1 week.   RTC in ~1mo

## 2022-02-04 ENCOUNTER — LAB VISIT (OUTPATIENT)
Dept: LAB | Facility: HOSPITAL | Age: 37
End: 2022-02-04
Attending: INTERNAL MEDICINE
Payer: MEDICAID

## 2022-02-04 ENCOUNTER — SOCIAL WORK (OUTPATIENT)
Dept: TRANSPLANT | Facility: CLINIC | Age: 37
End: 2022-02-04

## 2022-02-04 ENCOUNTER — OFFICE VISIT (OUTPATIENT)
Dept: TRANSPLANT | Facility: CLINIC | Age: 37
End: 2022-02-04
Payer: MEDICAID

## 2022-02-04 VITALS
HEART RATE: 94 BPM | BODY MASS INDEX: 23.24 KG/M2 | DIASTOLIC BLOOD PRESSURE: 52 MMHG | HEIGHT: 75 IN | SYSTOLIC BLOOD PRESSURE: 95 MMHG | WEIGHT: 186.94 LBS

## 2022-02-04 DIAGNOSIS — I50.42 CHRONIC COMBINED SYSTOLIC AND DIASTOLIC CONGESTIVE HEART FAILURE: Primary | ICD-10-CM

## 2022-02-04 DIAGNOSIS — D63.8 ANEMIA OF CHRONIC DISEASE: ICD-10-CM

## 2022-02-04 DIAGNOSIS — R06.02 SHORTNESS OF BREATH: ICD-10-CM

## 2022-02-04 DIAGNOSIS — I42.0 DILATED CARDIOMYOPATHY: ICD-10-CM

## 2022-02-04 DIAGNOSIS — I50.42 CHRONIC COMBINED SYSTOLIC AND DIASTOLIC CONGESTIVE HEART FAILURE: ICD-10-CM

## 2022-02-04 PROBLEM — Z95.810 USES LIFEVEST DEFIBRILLATOR: Status: RESOLVED | Noted: 2020-10-26 | Resolved: 2022-02-04

## 2022-02-04 LAB
ALBUMIN SERPL BCP-MCNC: 4.2 G/DL (ref 3.5–5.2)
ALP SERPL-CCNC: 115 U/L (ref 55–135)
ALT SERPL W/O P-5'-P-CCNC: 37 U/L (ref 10–44)
ANION GAP SERPL CALC-SCNC: 11 MMOL/L (ref 8–16)
AST SERPL-CCNC: 42 U/L (ref 10–40)
BASOPHILS # BLD AUTO: 0.04 K/UL (ref 0–0.2)
BASOPHILS NFR BLD: 0.5 % (ref 0–1.9)
BILIRUB SERPL-MCNC: 0.9 MG/DL (ref 0.1–1)
BUN SERPL-MCNC: 50 MG/DL (ref 6–20)
CALCIUM SERPL-MCNC: 10.5 MG/DL (ref 8.7–10.5)
CHLORIDE SERPL-SCNC: 95 MMOL/L (ref 95–110)
CO2 SERPL-SCNC: 28 MMOL/L (ref 23–29)
CREAT SERPL-MCNC: 2.2 MG/DL (ref 0.5–1.4)
DIFFERENTIAL METHOD: ABNORMAL
EOSINOPHIL # BLD AUTO: 0.1 K/UL (ref 0–0.5)
EOSINOPHIL NFR BLD: 1.4 % (ref 0–8)
ERYTHROCYTE [DISTWIDTH] IN BLOOD BY AUTOMATED COUNT: 18 % (ref 11.5–14.5)
EST. GFR  (AFRICAN AMERICAN): 43 ML/MIN/1.73 M^2
EST. GFR  (NON AFRICAN AMERICAN): 37.2 ML/MIN/1.73 M^2
GLUCOSE SERPL-MCNC: 131 MG/DL (ref 70–110)
HCT VFR BLD AUTO: 37.9 % (ref 40–54)
HGB BLD-MCNC: 12.7 G/DL (ref 14–18)
IMM GRANULOCYTES # BLD AUTO: 0.01 K/UL (ref 0–0.04)
IMM GRANULOCYTES NFR BLD AUTO: 0.1 % (ref 0–0.5)
LYMPHOCYTES # BLD AUTO: 1.9 K/UL (ref 1–4.8)
LYMPHOCYTES NFR BLD: 24.6 % (ref 18–48)
MCH RBC QN AUTO: 28.5 PG (ref 27–31)
MCHC RBC AUTO-ENTMCNC: 33.5 G/DL (ref 32–36)
MCV RBC AUTO: 85 FL (ref 82–98)
MONOCYTES # BLD AUTO: 0.9 K/UL (ref 0.3–1)
MONOCYTES NFR BLD: 11.1 % (ref 4–15)
NEUTROPHILS # BLD AUTO: 4.9 K/UL (ref 1.8–7.7)
NEUTROPHILS NFR BLD: 62.3 % (ref 38–73)
NRBC BLD-RTO: 0 /100 WBC
PLATELET # BLD AUTO: 293 K/UL (ref 150–450)
PMV BLD AUTO: 9.8 FL (ref 9.2–12.9)
POTASSIUM SERPL-SCNC: 3.6 MMOL/L (ref 3.5–5.1)
PROT SERPL-MCNC: 8.4 G/DL (ref 6–8.4)
RBC # BLD AUTO: 4.46 M/UL (ref 4.6–6.2)
SODIUM SERPL-SCNC: 134 MMOL/L (ref 136–145)
WBC # BLD AUTO: 7.85 K/UL (ref 3.9–12.7)

## 2022-02-04 PROCEDURE — 99999 PR PBB SHADOW E&M-EST. PATIENT-LVL IV: ICD-10-PCS | Mod: PBBFAC,,, | Performed by: INTERNAL MEDICINE

## 2022-02-04 PROCEDURE — 4010F PR ACE/ARB THEARPY RXD/TAKEN: ICD-10-PCS | Mod: CPTII,,, | Performed by: INTERNAL MEDICINE

## 2022-02-04 PROCEDURE — 83880 ASSAY OF NATRIURETIC PEPTIDE: CPT | Performed by: INTERNAL MEDICINE

## 2022-02-04 PROCEDURE — 99214 OFFICE O/P EST MOD 30 MIN: CPT | Mod: PBBFAC | Performed by: INTERNAL MEDICINE

## 2022-02-04 PROCEDURE — 99999 PR PBB SHADOW E&M-EST. PATIENT-LVL IV: CPT | Mod: PBBFAC,,, | Performed by: INTERNAL MEDICINE

## 2022-02-04 PROCEDURE — 4010F ACE/ARB THERAPY RXD/TAKEN: CPT | Mod: CPTII,,, | Performed by: INTERNAL MEDICINE

## 2022-02-04 PROCEDURE — 1159F PR MEDICATION LIST DOCUMENTED IN MEDICAL RECORD: ICD-10-PCS | Mod: CPTII,,, | Performed by: INTERNAL MEDICINE

## 2022-02-04 PROCEDURE — 3008F BODY MASS INDEX DOCD: CPT | Mod: CPTII,,, | Performed by: INTERNAL MEDICINE

## 2022-02-04 PROCEDURE — 85025 COMPLETE CBC W/AUTO DIFF WBC: CPT | Performed by: INTERNAL MEDICINE

## 2022-02-04 PROCEDURE — 99214 PR OFFICE/OUTPT VISIT, EST, LEVL IV, 30-39 MIN: ICD-10-PCS | Mod: S$PBB,,, | Performed by: INTERNAL MEDICINE

## 2022-02-04 PROCEDURE — 1159F MED LIST DOCD IN RCRD: CPT | Mod: CPTII,,, | Performed by: INTERNAL MEDICINE

## 2022-02-04 PROCEDURE — 99214 OFFICE O/P EST MOD 30 MIN: CPT | Mod: S$PBB,,, | Performed by: INTERNAL MEDICINE

## 2022-02-04 PROCEDURE — 3008F PR BODY MASS INDEX (BMI) DOCUMENTED: ICD-10-PCS | Mod: CPTII,,, | Performed by: INTERNAL MEDICINE

## 2022-02-04 PROCEDURE — 36415 COLL VENOUS BLD VENIPUNCTURE: CPT | Performed by: INTERNAL MEDICINE

## 2022-02-04 PROCEDURE — 3074F PR MOST RECENT SYSTOLIC BLOOD PRESSURE < 130 MM HG: ICD-10-PCS | Mod: CPTII,,, | Performed by: INTERNAL MEDICINE

## 2022-02-04 PROCEDURE — 3074F SYST BP LT 130 MM HG: CPT | Mod: CPTII,,, | Performed by: INTERNAL MEDICINE

## 2022-02-04 PROCEDURE — 80053 COMPREHEN METABOLIC PANEL: CPT | Performed by: INTERNAL MEDICINE

## 2022-02-04 PROCEDURE — 3078F DIAST BP <80 MM HG: CPT | Mod: CPTII,,, | Performed by: INTERNAL MEDICINE

## 2022-02-04 PROCEDURE — 3078F PR MOST RECENT DIASTOLIC BLOOD PRESSURE < 80 MM HG: ICD-10-PCS | Mod: CPTII,,, | Performed by: INTERNAL MEDICINE

## 2022-02-04 PROCEDURE — 80307 DRUG TEST PRSMV CHEM ANLYZR: CPT | Performed by: INTERNAL MEDICINE

## 2022-02-04 PROCEDURE — 80323 ALKALOIDS NOS: CPT | Performed by: INTERNAL MEDICINE

## 2022-02-04 RX ORDER — SACUBITRIL AND VALSARTAN 24; 26 MG/1; MG/1
1 TABLET, FILM COATED ORAL 2 TIMES DAILY
Qty: 60 TABLET | Refills: 5 | Status: ON HOLD | OUTPATIENT
Start: 2022-02-04 | End: 2022-04-25 | Stop reason: HOSPADM

## 2022-02-04 RX ORDER — SUCRALFATE 1 G/1
1 TABLET ORAL NIGHTLY
Status: ON HOLD | COMMUNITY
Start: 2022-01-11 | End: 2022-07-27 | Stop reason: HOSPADM

## 2022-02-04 RX ORDER — POTASSIUM CHLORIDE 1500 MG/1
20 TABLET, EXTENDED RELEASE ORAL DAILY
Status: ON HOLD | COMMUNITY
Start: 2022-01-26 | End: 2022-04-25 | Stop reason: HOSPADM

## 2022-02-04 RX ORDER — PROMETHAZINE HYDROCHLORIDE 12.5 MG/1
12.5 TABLET ORAL EVERY 6 HOURS PRN
COMMUNITY
Start: 2022-01-11 | End: 2022-05-31 | Stop reason: SDUPTHER

## 2022-02-04 NOTE — PROGRESS NOTES
Advanced Heart Failure and Transplantation Clinic Follow up.        Attending Physician: Josh Pulido MD.  The patient's last visit with me was on Visit date not found.         HPI.   Patient ID:  Kevan Queen is a 36 y.o. male who presents for follow-up of Congestive Heart Failure     HPI 37 yo BM with NICM, on home milrinone, hx of substance abuse who comes in for f/u. States that he has been doing well since last visit with Dr Lopez.Sleeps on 2 pillows. No PND anymore. Volume/weight has been stable. Great appetite. He did have to have his PICC line replaced as it got pulled out during sleep. He got his ICD placed last month in [place].     He c/o orthostatic dizziness. No congestive symptoms.    He claims being clean of any drugs, alcoho or tobacco for a long time. He came with his fiance that he has been living with for the last 8 years. They have 2 children in common. He has a total of 9 kids.        Review of Systems   Constitutional: Negative for activity change, appetite change, chills, diaphoresis and fever.   HENT: Negative for nasal congestion, rhinorrhea and sore throat.    Eyes: Negative for visual disturbance.   Respiratory: Negative for cough, chest tightness and shortness of breath.    Cardiovascular: Negative for chest pain, palpitations and leg swelling.   Gastrointestinal: Negative for abdominal pain, diarrhea, nausea and vomiting.   Genitourinary: Negative for difficulty urinating, dysuria and hematuria.   Integumentary:  Negative for rash.   Neurological: Negative for seizures, syncope and light-headedness.   Psychiatric/Behavioral: Negative for agitation and hallucinations.        Past Medical History:   Diagnosis Date    Cardiomyopathy     CHF (congestive heart failure) 10/01/2020    Dilated cardiomyopathy 10/26/2020    Drug abuse 10/2020        No past surgical history on file.     Family  History   Problem Relation Age of Onset    Heart failure Father     Diverticulosis Brother     Heart attack Maternal Grandmother     Heart attack Maternal Grandfather         Review of patient's allergies indicates:  No Known Allergies     Current Outpatient Medications   Medication Instructions    albuterol (PROVENTIL/VENTOLIN HFA) 90 mcg/actuation inhaler INHALE 2 PUFFS BY MOUTH EVERY 4 HOURS AS NEEDED FOR COUGH OR WHEEZING    aspirin 81 mg, Oral, Daily    ENTRESTO 49-51 mg per tablet TAKE 1 TABLET BY MOUTH TWICE DAILY    ferrous sulfate 325 mg, Oral, Daily    furosemide (LASIX) 80 mg, Oral, 2 times daily    metOLazone (ZAROXOLYN) 2.5 mg, Oral, Every other day    metoprolol succinate (TOPROL-XL) 25 MG 24 hr tablet TAKE 1 TABLET(25 MG) BY MOUTH EVERY DAY    milrinone (PRIMACOR) 1 mg/mL injection Intravenous    mirtazapine (REMERON) 15 mg, Oral, Nightly    pantoprazole (PROTONIX) 40 MG tablet TK 1 T PO D  ON AN OES    potassium chloride (K-TAB) 20 mEq 20 mEq, Oral, Daily    prochlorperazine (COMPAZINE) 5 mg, Oral, Every 6 hours PRN    promethazine (PHENERGAN) 12.5 mg, Oral, Every 6 hours PRN    sodium chloride 0.9% (NORMAL SALINE FLUSH) injection No dose, route, or frequency recorded.    spironolactone (ALDACTONE) 12.5 mg, Oral, Daily    sucralfate (CARAFATE) 1 g, Oral, Nightly    traMADoL (ULTRAM) 50 mg, Oral, Every 6 hours PRN        Vitals:    02/04/22 1428   BP: (!) 95/52   Pulse: 94        Wt Readings from Last 3 Encounters:   02/04/22 84.8 kg (186 lb 15.2 oz)   01/03/22 80.4 kg (177 lb 4 oz)   12/01/20 83.8 kg (184 lb 11.9 oz)     Temp Readings from Last 3 Encounters:   10/30/20 97.4 °F (36.3 °C) (Oral)     BP Readings from Last 3 Encounters:   02/04/22 (!) 95/52   01/03/22 (!) 99/58   12/01/20 115/71     Pulse Readings from Last 3 Encounters:   02/04/22 94   01/03/22 98   12/01/20 107        Body mass index is 23.37 kg/m². Estimated body surface area is 2.12 meters squared as calculated  "from the following:    Height as of this encounter: 6' 3" (1.905 m).    Weight as of this encounter: 84.8 kg (186 lb 15.2 oz).     Physical Exam  Constitutional:       Appearance: He is well-developed and well-nourished.   HENT:      Head: Normocephalic and atraumatic.      Right Ear: External ear normal.      Left Ear: External ear normal.   Eyes:      Extraocular Movements: EOM normal.      Conjunctiva/sclera: Conjunctivae normal.      Pupils: Pupils are equal, round, and reactive to light.   Neck:      Vascular: No JVD.   Cardiovascular:      Rate and Rhythm: Normal rate and regular rhythm.      Pulses: Intact distal pulses.      Heart sounds: S1 normal and S2 normal. No murmur heard.  No friction rub. No gallop.    Pulmonary:      Effort: Pulmonary effort is normal.      Breath sounds: Normal breath sounds.   Abdominal:      General: Bowel sounds are normal. There is no distension.      Palpations: Abdomen is soft.      Tenderness: There is no abdominal tenderness. There is no guarding or rebound.   Musculoskeletal:         General: No edema.      Cervical back: Normal range of motion and neck supple.      Right lower leg: No edema.      Left lower leg: No edema.   Neurological:      Mental Status: He is alert and oriented to person, place, and time.      Deep Tendon Reflexes: Strength normal.          Lab Results   Component Value Date    BNP 1,970 (H) 12/01/2020     12/01/2020    K 4.1 12/01/2020    MG 2.4 11/05/2020     12/01/2020    CO2 28 12/01/2020    BUN 26 (H) 12/01/2020    CREATININE 1.7 (H) 12/01/2020     (H) 12/01/2020    AST 34 10/30/2020    ALT 47 (H) 10/30/2020    ALBUMIN 3.1 (L) 10/30/2020    PROT 6.6 10/30/2020    BILITOT 0.4 10/30/2020    WBC 8.74 10/30/2020    HGB 11.8 (L) 10/30/2020    HCT 36.8 (L) 10/30/2020     10/30/2020    TSH 4.092 (H) 10/27/2020    FREET4 1.04 10/27/2020              Results for orders placed during the hospital encounter of 10/25/20    Echo " Color Flow Doppler? Yes    Interpretation Summary  · Severe left atrial enlargement.  · There is severe left ventricular eccentric hypertrophy.  · The left ventricle is severely enlarged with severely decreased systolic function. The estimated ejection fraction is 10%.  · There is severe left ventricular global hypokinesis.  · Grade II diastolic dysfunction.  · Mild right ventricular enlargement.  · Moderately reduced right ventricular systolic function.  · Moderate-to-severe mitral regurgitation.  · Mild to moderate tricuspid regurgitation.  · Intermediate central venous pressure (8 mmHg).  · There is pulmonary hypertension.  · The estimated PA systolic pressure is 44 mmHg.  · Trivial posterior pericardial effusion.        No results found for this or any previous visit.         Assessment and Plan:  There are no diagnoses linked to this encounter.      1. Chronic systolic HF, NYHA class III, stage D. On home milrinone.  Etiology: NICM, drug induced? Family trait? Hypertensive heart disease?  Devices: ICD placed last month at OSH.  Medications:  Medium dose sacubitril-valsartan, metoprolol succinate, spironolactone.  Hemodynamic status: warm, normotensive, euvolemic.  Clinical course: last hospitalization November 2021 (at [place], [place])  Plan:  -patient to adhere to a fluid restriction of NMT 1.5 liters/24h.  -patient to adhere to  low sodium diet, NMT 1.5 grams of sodium in a day.  -due to symptomatic hypotension, decreasing dose of entresto.    Plan for triage with ECG, echo, RHC. Ordering drug screen and baseline labs today.  Also, I asked SW to meet with patient to determine social/caregiver/financial situation for advanced therapies.    F/u in 4-6 weeks.

## 2022-02-04 NOTE — PROGRESS NOTES
LCSW was asked by Dr. Griffin Pulido MD to address suitability for advanced work up options. LCSW met with Pt and his Pradeep, Niharika Wright, [phone] who present as AAO x4, calm, cooperative, and asking and answering questions appropriately. Pt identified two potential caregivers Primary caregiver, Niharika Wright (travisancee), they have been together for 8 years, and live together. Secondary caregiver, Malou Dumont (mother)  [phone]. Both caregivers drive and have access to reliable transportation.  Pt reports the following substance abuse history: Marijuana last used 2021, Ecstasy last used Sep 2020, and Cocaine last used 2021. Pt reports that he has been to assisted/prision three times with his longest stent in [facility] for 5 years. Pt states he has no pending legal issues at this time.  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 Pt states he and Niharika are financially stable noting that when his father , his family was left large sum of money and have no financial needs at this time. According to Pt, Dr. Griffin Pulido would like Pt to see psychiatry, Pt states he is willing and states that he has been through a lot and has a lot of anger that have built up over the years. Pt states he will call to schedule with a psychiatrist in [place], [place]. LCSW offered support. Pt denied any SI/HI/AVH during meeting. If workup is pursued, referrals for addiction psychiatry, psychiatry, and counseling are recommended.

## 2022-02-06 LAB
COTININE SERPL-MCNC: 242 NG/ML
NICOTINE SERPL-MCNC: 7.1 NG/ML
NT-PROBNP SERPL-MCNC: 729 PG/ML

## 2022-02-07 LAB
AMPHETAMINES SERPL QL: NEGATIVE
BARBITURATES SERPL QL SCN: NEGATIVE
BENZODIAZ SERPL QL SCN: NEGATIVE
BZE SERPL QL: NEGATIVE
CARBOXYTHC SERPL QL SCN: NEGATIVE
ETHANOL SERPL QL SCN: NEGATIVE
METHADONE SERPL QL SCN: NEGATIVE
OPIATES SERPL QL SCN: NEGATIVE
PCP SERPL QL SCN: NEGATIVE
PROPOXYPH SERPL QL: NEGATIVE

## 2022-02-16 ENCOUNTER — EPISODE CHANGES (OUTPATIENT)
Dept: TRANSPLANT | Facility: CLINIC | Age: 37
End: 2022-02-16

## 2022-03-15 ENCOUNTER — TELEPHONE (OUTPATIENT)
Dept: TRANSPLANT | Facility: CLINIC | Age: 37
End: 2022-03-15
Payer: MEDICAID

## 2022-03-24 ENCOUNTER — HISTORICAL (OUTPATIENT)
Dept: ADMINISTRATIVE | Facility: HOSPITAL | Age: 37
End: 2022-03-24

## 2022-04-07 DIAGNOSIS — I50.42 CHRONIC COMBINED SYSTOLIC AND DIASTOLIC CONGESTIVE HEART FAILURE: Primary | ICD-10-CM

## 2022-04-08 ENCOUNTER — HOSPITAL ENCOUNTER (OUTPATIENT)
Facility: HOSPITAL | Age: 37
Discharge: HOME | End: 2022-04-08
Attending: INTERNAL MEDICINE | Admitting: INTERNAL MEDICINE
Payer: MEDICAID

## 2022-04-08 ENCOUNTER — HOSPITAL ENCOUNTER (OUTPATIENT)
Dept: CARDIOLOGY | Facility: HOSPITAL | Age: 37
Discharge: HOME | End: 2022-04-08
Attending: INTERNAL MEDICINE
Payer: MEDICAID

## 2022-04-08 ENCOUNTER — HOSPITAL ENCOUNTER (OUTPATIENT)
Dept: CARDIOLOGY | Facility: CLINIC | Age: 37
Discharge: HOME | End: 2022-04-08
Payer: MEDICAID

## 2022-04-08 VITALS
WEIGHT: 186 LBS | DIASTOLIC BLOOD PRESSURE: 76 MMHG | BODY MASS INDEX: 23.13 KG/M2 | HEART RATE: 101 BPM | HEIGHT: 75 IN | SYSTOLIC BLOOD PRESSURE: 94 MMHG

## 2022-04-08 VITALS
OXYGEN SATURATION: 97 % | SYSTOLIC BLOOD PRESSURE: 112 MMHG | HEART RATE: 106 BPM | RESPIRATION RATE: 17 BRPM | DIASTOLIC BLOOD PRESSURE: 66 MMHG | WEIGHT: 207 LBS | BODY MASS INDEX: 25.74 KG/M2 | HEIGHT: 75 IN | TEMPERATURE: 98 F

## 2022-04-08 DIAGNOSIS — I50.42 CHRONIC COMBINED SYSTOLIC AND DIASTOLIC CONGESTIVE HEART FAILURE: ICD-10-CM

## 2022-04-08 LAB
ASCENDING AORTA: 2.37 CM
AV INDEX (PROSTH): 0.63
AV MEAN GRADIENT: 2 MMHG
AV PEAK GRADIENT: 4 MMHG
AV VALVE AREA: 2.11 CM2
AV VELOCITY RATIO: 0.62
BSA FOR ECHO PROCEDURE: 2.11 M2
CV ECHO LV RWT: 0.2 CM
DOP CALC AO PEAK VEL: 0.94 M/S
DOP CALC AO VTI: 13.6 CM
DOP CALC LVOT AREA: 3.3 CM2
DOP CALC LVOT DIAMETER: 2.06 CM
DOP CALC LVOT PEAK VEL: 0.58 M/S
DOP CALC LVOT STROKE VOLUME: 28.75 CM3
DOP CALC MV VTI: 9.83 CM
DOP CALCLVOT PEAK VEL VTI: 8.63 CM
E WAVE DECELERATION TIME: 105.51 MSEC
E/A RATIO: 2.46
E/E' RATIO: 20.22 M/S
ECHO LV POSTERIOR WALL: 0.69 CM (ref 0.6–1.1)
EJECTION FRACTION: 10 %
FRACTIONAL SHORTENING: 5 % (ref 28–44)
INTERVENTRICULAR SEPTUM: 0.7 CM (ref 0.6–1.1)
IVRT: 37.11 MSEC
LA MAJOR: 6.49 CM
LA MINOR: 6.46 CM
LA WIDTH: 4.88 CM
LEFT ATRIUM SIZE: 4.51 CM
LEFT ATRIUM VOLUME INDEX MOD: 49.3 ML/M2
LEFT ATRIUM VOLUME INDEX: 56.9 ML/M2
LEFT ATRIUM VOLUME MOD: 105 CM3
LEFT ATRIUM VOLUME: 121.13 CM3
LEFT INTERNAL DIMENSION IN SYSTOLE: 6.67 CM (ref 2.1–4)
LEFT VENTRICLE DIASTOLIC VOLUME INDEX: 119.46 ML/M2
LEFT VENTRICLE DIASTOLIC VOLUME: 254.45 ML
LEFT VENTRICLE MASS INDEX: 97 G/M2
LEFT VENTRICLE SYSTOLIC VOLUME INDEX: 107.5 ML/M2
LEFT VENTRICLE SYSTOLIC VOLUME: 228.9 ML
LEFT VENTRICULAR INTERNAL DIMENSION IN DIASTOLE: 7 CM (ref 3.5–6)
LEFT VENTRICULAR MASS: 206.59 G
LV LATERAL E/E' RATIO: 18.2 M/S
LV SEPTAL E/E' RATIO: 22.75 M/S
MV A" WAVE DURATION": 6.09 MSEC
MV MEAN GRADIENT: 0 MMHG
MV PEAK A VEL: 0.37 M/S
MV PEAK E VEL: 0.91 M/S
MV PEAK GRADIENT: 1 MMHG
MV STENOSIS PRESSURE HALF TIME: 50.28 MS
MV VALVE AREA BY CONTINUITY EQUATION: 2.92 CM2
MV VALVE AREA P 1/2 METHOD: 4.38 CM2
PISA MRMAX VEL: 0.04 M/S
PISA TR MAX VEL: 3.55 M/S
PULM VEIN S/D RATIO: 0.39
PV PEAK D VEL: 0.89 M/S
PV PEAK S VEL: 0.35 M/S
RA MAJOR: 5.86 CM
RA PRESSURE: 8 MMHG
RA WIDTH: 4.06 CM
RIGHT VENTRICULAR END-DIASTOLIC DIMENSION: 5.2 CM
RV TISSUE DOPPLER FREE WALL SYSTOLIC VELOCITY 1 (APICAL 4 CHAMBER VIEW): 9.91 CM/S
SINUS: 2.62 CM
STJ: 2.09 CM
TDI LATERAL: 0.05 M/S
TDI SEPTAL: 0.04 M/S
TDI: 0.05 M/S
TR MAX PG: 50 MMHG
TRICUSPID ANNULAR PLANE SYSTOLIC EXCURSION: 1.38 CM
TV REST PULMONARY ARTERY PRESSURE: 58 MMHG

## 2022-04-08 PROCEDURE — 93005 ELECTROCARDIOGRAM TRACING: CPT | Mod: PBBFAC,59 | Performed by: INTERNAL MEDICINE

## 2022-04-08 PROCEDURE — C8929 TTE W OR WO FOL WCON,DOPPLER: HCPCS

## 2022-04-08 PROCEDURE — 93010 ELECTROCARDIOGRAM REPORT: CPT | Mod: S$PBB,,, | Performed by: INTERNAL MEDICINE

## 2022-04-08 PROCEDURE — C1894 INTRO/SHEATH, NON-LASER: HCPCS | Performed by: INTERNAL MEDICINE

## 2022-04-08 PROCEDURE — 25000003 PHARM REV CODE 250: Performed by: INTERNAL MEDICINE

## 2022-04-08 PROCEDURE — 93306 ECHO (CUPID ONLY): ICD-10-PCS | Mod: 26,,, | Performed by: INTERNAL MEDICINE

## 2022-04-08 PROCEDURE — 93306 TTE W/DOPPLER COMPLETE: CPT | Mod: 26,,, | Performed by: INTERNAL MEDICINE

## 2022-04-08 PROCEDURE — 93451 PR RIGHT HEART CATH O2 SATURATION & CARDIAC OUTPUT: ICD-10-PCS | Mod: 26,,, | Performed by: INTERNAL MEDICINE

## 2022-04-08 PROCEDURE — 93451 RIGHT HEART CATH: CPT | Mod: 26,,, | Performed by: INTERNAL MEDICINE

## 2022-04-08 PROCEDURE — 93010 EKG 12-LEAD: ICD-10-PCS | Mod: S$PBB,,, | Performed by: INTERNAL MEDICINE

## 2022-04-08 PROCEDURE — C1751 CATH, INF, PER/CENT/MIDLINE: HCPCS | Performed by: INTERNAL MEDICINE

## 2022-04-08 PROCEDURE — 93451 RIGHT HEART CATH: CPT | Performed by: INTERNAL MEDICINE

## 2022-04-08 PROCEDURE — 25500020 PHARM REV CODE 255: Performed by: INTERNAL MEDICINE

## 2022-04-08 RX ORDER — SODIUM CHLORIDE 0.9 G/100ML
IRRIGANT IRRIGATION
Status: DISCONTINUED | OUTPATIENT
Start: 2022-04-08 | End: 2022-04-08 | Stop reason: HOSPADM

## 2022-04-08 RX ORDER — BUSPIRONE HYDROCHLORIDE 7.5 MG/1
7.5 TABLET ORAL EVERY 12 HOURS
Status: ON HOLD | COMMUNITY
End: 2022-07-27 | Stop reason: HOSPADM

## 2022-04-08 RX ORDER — LIDOCAINE HYDROCHLORIDE 20 MG/ML
INJECTION, SOLUTION EPIDURAL; INFILTRATION; INTRACAUDAL; PERINEURAL
Status: DISCONTINUED | OUTPATIENT
Start: 2022-04-08 | End: 2022-04-08 | Stop reason: HOSPADM

## 2022-04-08 RX ORDER — NITROGLYCERIN 400 UG/1
SPRAY ORAL
Status: DISCONTINUED | OUTPATIENT
Start: 2022-04-08 | End: 2022-04-08 | Stop reason: HOSPADM

## 2022-04-08 RX ADMIN — HUMAN ALBUMIN MICROSPHERES AND PERFLUTREN 0.66 MG: 10; .22 INJECTION, SOLUTION INTRAVENOUS at 11:04

## 2022-04-08 NOTE — PLAN OF CARE
Pt arrived to unit accompanied by family.  Pre op orders and assessment initiated.  Primacor infusing to RAC per home infusion pump.  Pt in no acute distress and verbalizes no complaints.  Will continue to monitor.  Call bell within reach.

## 2022-04-08 NOTE — HPI
"36 y/o M here for RHC as part of advanced options evaluation.     From Dr Griffin Pulido's clinic note: "38 yo BM with NICM, on home milrinone, hx of substance abuse who comes in for f/u. States that he has been doing well since last visit with Dr Lopez.Sleeps on 2 pillows. No PND anymore. Volume/weight has been stable. Great appetite. He did have to have his PICC line replaced as it got pulled out during sleep. He got his ICD placed last month in [place].      He c/o orthostatic dizziness. No congestive symptoms.     He claims being clean of any drugs, alcoho or tobacco for a long time. He came with his fiance that he has been living with for the last 8 years. They have 2 children in common. He has a total of 9 kids."   "

## 2022-04-08 NOTE — Clinical Note
The PA catheter was repositioned to the main pulmonary artery. Hemodynamics were performed. Cardiac output was obtained at 4 L/min. O2 saturation was measured at 50%. (POST-NITRO)  C.O= 3.64  C.I = 1.63

## 2022-04-08 NOTE — DISCHARGE INSTRUCTIONS

## 2022-04-08 NOTE — Clinical Note
The PA catheter was repositioned to the main pulmonary artery. Hemodynamics were performed. Cardiac output was obtained at 3 L/min. O2 saturation was measured at 46%.  C.O =3.27   C.I =1.47

## 2022-04-08 NOTE — PLAN OF CARE
Patient received s/p RHC. Right neck with dry gauze/tegaderm dressing intact. No bleeding, no hematoma. Vss. Aao x4. Home care instructions reviewed with patient. Dressing to be removed in 24 hours.  mother and wife at bedside. harleen PICC line intact with home  Primacor infusion intact. No distress noted. AVS printed. Ambulatory for Discharge.

## 2022-04-08 NOTE — PATIENT INSTRUCTIONS
AFTER THE PROCEDURE:   -DO NOT DRINK OR EAT ANYTHING UNTIL NO LONGER HOARSE   -You may remove the bandage in 24 hours and wash with soap and water.   -You may shower, but do not soak in a tub for three days.   PRECAUTIONS FOR THE NEXT 24 HOURS:   -If you need to cough, sneeze, have a bowel movement, or bear down, hold pressure over your bandage.   -Do not  anything heavier than a gallon of milk(about 5 pounds)   -Avoid excessive bending over.   SYMPTOMS TO WATCH FOR AND REPORT TO YOUR DOCTOR:   -BLEEDING: hold pressure over the site until bleeding stops. Proceed to Emergency Room by ambulance (do not drive yourself) if unable to stop bleeding. Notify your doctor.   -HEMATOMA(hard bruise under the skin): Krunal around the bruise if one develops. Call your doctor if it increases in size or if you have difficulty talking, swallowing, breathing or anything unusual.   SIGNS OF INFECTION:Fever (temperature over 100.5 F), pus or redness   -RASH   -CHEST PAIN OR SHORTNESS OF BREATH   You may call you coordinator in the Heart Failure/Heart Transplant/Pulmonary Hypertension Clinic at [phone] during normal business hours(Monday through Friday from 8 A.M. to 5 P.M.) After hours, call the Heart Transplant Service doctor on call at [phone].

## 2022-04-08 NOTE — Clinical Note
The right neck was prepped. The site was prepped with ChloraPrep. The patient was draped. The patient was positioned supine. The patient was secured with a wedge under the patient.

## 2022-04-08 NOTE — ASSESSMENT & PLAN NOTE
Patient seen and examined. No interval change since last H&P. Here for a RHC to assess hemodynamics.   Access via the right IJ  7 Fr venous sheath  Risks and benefits of the procedure were explained to the patient. All questions were answered.  Consents were signed and in the chart.

## 2022-04-08 NOTE — SUBJECTIVE & OBJECTIVE
Past Medical History:   Diagnosis Date    Arthritis     Cardiomyopathy     CHF (congestive heart failure) 10/01/2020    Dilated cardiomyopathy 10/26/2020    Drug abuse 10/2020    Renal disorder        Past Surgical History:   Procedure Laterality Date    CARDIAC DEFIBRILLATOR PLACEMENT         Review of patient's allergies indicates:   Allergen Reactions    Bumex [bumetanide] Hives    Torsemide Hives       No current facility-administered medications on file prior to encounter.     Current Outpatient Medications on File Prior to Encounter   Medication Sig    albuterol (PROVENTIL/VENTOLIN HFA) 90 mcg/actuation inhaler INHALE 2 PUFFS BY MOUTH EVERY 4 HOURS AS NEEDED FOR COUGH OR WHEEZING    aspirin 81 MG Chew Take 81 mg by mouth once daily.    busPIRone (BUSPAR) 7.5 MG tablet Take 7.5 mg by mouth every 12 (twelve) hours.    furosemide (LASIX) 80 MG tablet Take 80 mg by mouth 2 (two) times daily.    metoprolol succinate (TOPROL-XL) 25 MG 24 hr tablet TAKE 1 TABLET(25 MG) BY MOUTH EVERY DAY    milrinone (PRIMACOR) 1 mg/mL injection Inject into the vein.    mirtazapine (REMERON) 15 MG tablet Take 15 mg by mouth nightly.    pantoprazole (PROTONIX) 40 MG tablet TK 1 T PO D  ON AN OES    potassium chloride (K-TAB) 20 mEq Take 20 mEq by mouth once daily.    sacubitriL-valsartan (ENTRESTO) 24-26 mg per tablet Take 1 tablet by mouth 2 (two) times daily.    spironolactone (ALDACTONE) 25 MG tablet Take 12.5 mg by mouth once daily.    sucralfate (CARAFATE) 1 gram tablet Take 1 g by mouth nightly.    traMADoL (ULTRAM) 50 mg tablet Take 50 mg by mouth every 6 (six) hours as needed.    ferrous sulfate 325 (65 FE) MG EC tablet Take 1 tablet (325 mg total) by mouth once daily.    metOLazone (ZAROXOLYN) 2.5 MG tablet Take 2.5 mg by mouth every other day.    prochlorperazine (COMPAZINE) 5 MG tablet Take 5 mg by mouth every 6 (six) hours as needed.    promethazine (PHENERGAN) 12.5 MG Tab Take 12.5 mg by mouth every 6 (six) hours as  needed.    sodium chloride 0.9% (NORMAL SALINE FLUSH) injection      Family History       Problem Relation (Age of Onset)    Diverticulosis Brother    Heart attack Maternal Grandmother, Maternal Grandfather    Heart failure Father          Tobacco Use    Smoking status: Former Smoker     Packs/day: 0.50     Years: 16.00     Pack years: 8.00     Start date: 10/1/2004     Quit date: 10/1/2020     Years since quittin.5    Smokeless tobacco: Never Used   Substance and Sexual Activity    Alcohol use: Not Currently    Drug use: Not Currently     Types: Marijuana, MDMA (Ecstacy)    Sexual activity: Yes     Partners: Female     Birth control/protection: None     Review of Systems   Constitutional: Negative for chills and fever.   Cardiovascular:  Negative for chest pain, dyspnea on exertion, palpitations and syncope.   Respiratory:  Negative for cough and sleep disturbances due to breathing.    Musculoskeletal:  Negative for back pain.   Gastrointestinal:  Negative for abdominal pain, nausea and vomiting.   Neurological:  Negative for dizziness and focal weakness.   Psychiatric/Behavioral:  Negative for altered mental status.    Objective:     Vital Signs (Most Recent):  Temp: 97.9 °F (36.6 °C) (22 1244)  Pulse: 103 (22 1244)  Resp: 18 (22 1244)  BP: 104/64 (22 1244)  SpO2: 99 % (22 1244) Vital Signs (24h Range):  Temp:  [97.9 °F (36.6 °C)] 97.9 °F (36.6 °C)  Pulse:  [101-103] 103  Resp:  [18] 18  SpO2:  [99 %] 99 %  BP: ()/(64-76) 104/64     Weight: 93.9 kg (207 lb 0.2 oz)  Body mass index is 25.87 kg/m².    SpO2: 99 %  O2 Device (Oxygen Therapy): room air    No intake or output data in the 24 hours ending 22 1456    Lines/Drains/Airways       Peripherally Inserted Central Catheter Line  Duration             PICC Double Lumen right brachial -- days              Peripheral Intravenous Line  Duration                  Peripheral IV - Single Lumen 10/29/20 1630 22 G  Anterior;Proximal;Right Forearm 525 days                    Physical Exam  Constitutional:       General: He is not in acute distress.     Appearance: He is well-developed. He is not diaphoretic.   HENT:      Head: Normocephalic and atraumatic.   Eyes:      Conjunctiva/sclera: Conjunctivae normal.   Neck:      Trachea: No tracheal deviation.   Cardiovascular:      Rate and Rhythm: Normal rate and regular rhythm.      Heart sounds: Normal heart sounds. No murmur heard.  Pulmonary:      Effort: Pulmonary effort is normal. No respiratory distress.      Breath sounds: Normal breath sounds. No wheezing.   Abdominal:      General: Bowel sounds are normal. There is no distension.      Palpations: Abdomen is soft.      Tenderness: There is no abdominal tenderness.   Musculoskeletal:         General: Normal range of motion.      Cervical back: Normal range of motion.   Neurological:      Mental Status: He is alert and oriented to person, place, and time.       Significant Labs: All pertinent lab results from the last 24 hours have been reviewed.    Significant Imaging: All pertinent images from the last 24h have been reviewed.

## 2022-04-08 NOTE — NURSING
Patient identified by 2 identifiers. Denies previous reactions to blood transfusions, allergies reviewed & procedure explained.  Double lumen PICC IV in place to Rt brachial, flushed w/ 10cc NS pre & post contrast administration.  3cc Optison administered, echo images obtained.  Pt tolerated procedure well.

## 2022-04-08 NOTE — H&P
"[facility] - Short Stay Cardiac Unit  Cardiology  History and Physical     Patient Name: Kevan Queen  MRN: [id number]  Admission Date: 4/8/2022  Code Status: Prior   Attending Provider: Luca Lopez Jr.,*   Primary Care Physician: ORALIA Cline  Principal Problem:Chronic combined systolic and diastolic congestive heart failure    Patient information was obtained from patient, past medical records and ER records.     Subjective:     Chief Complaint:  Advanced options eval     HPI:  36 y/o M here for RHC as part of advanced options evaluation.     From Dr Griffin Pulido's clinic note: "36 yo BM with NICM, on home milrinone, hx of substance abuse who comes in for f/u. States that he has been doing well since last visit with Dr Lopez.Sleeps on 2 pillows. No PND anymore. Volume/weight has been stable. Great appetite. He did have to have his PICC line replaced as it got pulled out during sleep. He got his ICD placed last month in [place].      He c/o orthostatic dizziness. No congestive symptoms.     He claims being clean of any drugs, alcoho or tobacco for a long time. He came with his fiance that he has been living with for the last 8 years. They have 2 children in common. He has a total of 9 kids."       Past Medical History:   Diagnosis Date    Arthritis     Cardiomyopathy     CHF (congestive heart failure) 10/01/2020    Dilated cardiomyopathy 10/26/2020    Drug abuse 10/2020    Renal disorder        Past Surgical History:   Procedure Laterality Date    CARDIAC DEFIBRILLATOR PLACEMENT         Review of patient's allergies indicates:   Allergen Reactions    Bumex [bumetanide] Hives    Torsemide Hives       No current facility-administered medications on file prior to encounter.     Current Outpatient Medications on File Prior to Encounter   Medication Sig    albuterol (PROVENTIL/VENTOLIN HFA) 90 mcg/actuation inhaler INHALE 2 PUFFS BY MOUTH EVERY 4 HOURS AS NEEDED FOR COUGH OR WHEEZING    " aspirin 81 MG Chew Take 81 mg by mouth once daily.    busPIRone (BUSPAR) 7.5 MG tablet Take 7.5 mg by mouth every 12 (twelve) hours.    furosemide (LASIX) 80 MG tablet Take 80 mg by mouth 2 (two) times daily.    metoprolol succinate (TOPROL-XL) 25 MG 24 hr tablet TAKE 1 TABLET(25 MG) BY MOUTH EVERY DAY    milrinone (PRIMACOR) 1 mg/mL injection Inject into the vein.    mirtazapine (REMERON) 15 MG tablet Take 15 mg by mouth nightly.    pantoprazole (PROTONIX) 40 MG tablet TK 1 T PO D  ON AN OES    potassium chloride (K-TAB) 20 mEq Take 20 mEq by mouth once daily.    sacubitriL-valsartan (ENTRESTO) 24-26 mg per tablet Take 1 tablet by mouth 2 (two) times daily.    spironolactone (ALDACTONE) 25 MG tablet Take 12.5 mg by mouth once daily.    sucralfate (CARAFATE) 1 gram tablet Take 1 g by mouth nightly.    traMADoL (ULTRAM) 50 mg tablet Take 50 mg by mouth every 6 (six) hours as needed.    ferrous sulfate 325 (65 FE) MG EC tablet Take 1 tablet (325 mg total) by mouth once daily.    metOLazone (ZAROXOLYN) 2.5 MG tablet Take 2.5 mg by mouth every other day.    prochlorperazine (COMPAZINE) 5 MG tablet Take 5 mg by mouth every 6 (six) hours as needed.    promethazine (PHENERGAN) 12.5 MG Tab Take 12.5 mg by mouth every 6 (six) hours as needed.    sodium chloride 0.9% (NORMAL SALINE FLUSH) injection      Family History       Problem Relation (Age of Onset)    Diverticulosis Brother    Heart attack Maternal Grandmother, Maternal Grandfather    Heart failure Father          Tobacco Use    Smoking status: Former Smoker     Packs/day: 0.50     Years: 16.00     Pack years: 8.00     Start date: 10/1/2004     Quit date: 10/1/2020     Years since quittin.5    Smokeless tobacco: Never Used   Substance and Sexual Activity    Alcohol use: Not Currently    Drug use: Not Currently     Types: Marijuana, MDMA (Ecstacy)    Sexual activity: Yes     Partners: Female     Birth control/protection: None     Review of  Systems   Constitutional: Negative for chills and fever.   Cardiovascular:  Negative for chest pain, dyspnea on exertion, palpitations and syncope.   Respiratory:  Negative for cough and sleep disturbances due to breathing.    Musculoskeletal:  Negative for back pain.   Gastrointestinal:  Negative for abdominal pain, nausea and vomiting.   Neurological:  Negative for dizziness and focal weakness.   Psychiatric/Behavioral:  Negative for altered mental status.    Objective:     Vital Signs (Most Recent):  Temp: 97.9 °F (36.6 °C) (04/08/22 1244)  Pulse: 103 (04/08/22 1244)  Resp: 18 (04/08/22 1244)  BP: 104/64 (04/08/22 1244)  SpO2: 99 % (04/08/22 1244) Vital Signs (24h Range):  Temp:  [97.9 °F (36.6 °C)] 97.9 °F (36.6 °C)  Pulse:  [101-103] 103  Resp:  [18] 18  SpO2:  [99 %] 99 %  BP: ()/(64-76) 104/64     Weight: 93.9 kg (207 lb 0.2 oz)  Body mass index is 25.87 kg/m².    SpO2: 99 %  O2 Device (Oxygen Therapy): room air    No intake or output data in the 24 hours ending 04/08/22 1456    Lines/Drains/Airways       Peripherally Inserted Central Catheter Line  Duration             PICC Double Lumen right brachial -- days              Peripheral Intravenous Line  Duration                  Peripheral IV - Single Lumen 10/29/20 1630 22 G Anterior;Proximal;Right Forearm 525 days                    Physical Exam  Constitutional:       General: He is not in acute distress.     Appearance: He is well-developed. He is not diaphoretic.   HENT:      Head: Normocephalic and atraumatic.   Eyes:      Conjunctiva/sclera: Conjunctivae normal.   Neck:      Trachea: No tracheal deviation.   Cardiovascular:      Rate and Rhythm: Normal rate and regular rhythm.      Heart sounds: Normal heart sounds. No murmur heard.  Pulmonary:      Effort: Pulmonary effort is normal. No respiratory distress.      Breath sounds: Normal breath sounds. No wheezing.   Abdominal:      General: Bowel sounds are normal. There is no distension.       Palpations: Abdomen is soft.      Tenderness: There is no abdominal tenderness.   Musculoskeletal:         General: Normal range of motion.      Cervical back: Normal range of motion.   Neurological:      Mental Status: He is alert and oriented to person, place, and time.       Significant Labs: All pertinent lab results from the last 24 hours have been reviewed.    Significant Imaging: All pertinent images from the last 24h have been reviewed.      Assessment and Plan:     * Chronic combined systolic and diastolic congestive heart failure  Patient seen and examined. No interval change since last H&P. Here for a RHC to assess hemodynamics.   Access via the right IJ  7 Fr venous sheath  Risks and benefits of the procedure were explained to the patient. All questions were answered.  Consents were signed and in the chart.              VTE Risk Mitigation (From admission, onward)    None          Franco Alvarado MD  Cardiology   [facility] - Short Stay Cardiac Unit

## 2022-04-10 ENCOUNTER — HISTORICAL (OUTPATIENT)
Dept: ADMINISTRATIVE | Facility: HOSPITAL | Age: 37
End: 2022-04-10
Payer: MEDICAID

## 2022-04-11 ENCOUNTER — TELEPHONE (OUTPATIENT)
Dept: TRANSPLANT | Facility: CLINIC | Age: 37
End: 2022-04-11
Payer: MEDICAID

## 2022-04-11 NOTE — TELEPHONE ENCOUNTER
Per Dr Susan Pulido, opened an episode for OLAYINKA and requested financial clearance for consult.

## 2022-04-12 ENCOUNTER — HOSPITAL ENCOUNTER (INPATIENT)
Facility: HOSPITAL | Age: 37
LOS: 13 days | Discharge: HOME HEALTH | DRG: 286 | End: 2022-04-25
Attending: INTERNAL MEDICINE | Admitting: INTERNAL MEDICINE
Payer: MEDICAID

## 2022-04-12 DIAGNOSIS — N17.9 AKI (ACUTE KIDNEY INJURY): ICD-10-CM

## 2022-04-12 DIAGNOSIS — F41.1 GENERALIZED ANXIETY DISORDER: ICD-10-CM

## 2022-04-12 DIAGNOSIS — I47.20 VENTRICULAR TACHYCARDIA: ICD-10-CM

## 2022-04-12 DIAGNOSIS — R57.0 CARDIOGENIC SHOCK: ICD-10-CM

## 2022-04-12 DIAGNOSIS — I42.0 DILATED CARDIOMYOPATHY: ICD-10-CM

## 2022-04-12 DIAGNOSIS — I50.43 ACUTE ON CHRONIC COMBINED SYSTOLIC (CONGESTIVE) AND DIASTOLIC (CONGESTIVE) HEART FAILURE: ICD-10-CM

## 2022-04-12 DIAGNOSIS — I50.23 ACUTE ON CHRONIC SYSTOLIC CONGESTIVE HEART FAILURE: ICD-10-CM

## 2022-04-12 DIAGNOSIS — F12.20 CANNABIS USE DISORDER, SEVERE, DEPENDENCE: Chronic | ICD-10-CM

## 2022-04-12 DIAGNOSIS — I50.43 ACUTE ON CHRONIC COMBINED SYSTOLIC AND DIASTOLIC HEART FAILURE, NYHA CLASS 4: Primary | ICD-10-CM

## 2022-04-12 DIAGNOSIS — Z95.810 ICD (IMPLANTABLE CARDIOVERTER-DEFIBRILLATOR) IN PLACE: ICD-10-CM

## 2022-04-12 DIAGNOSIS — I50.9 ACUTE EXACERBATION OF CHF (CONGESTIVE HEART FAILURE): ICD-10-CM

## 2022-04-12 DIAGNOSIS — Z72.0 TOBACCO USE: ICD-10-CM

## 2022-04-12 DIAGNOSIS — I50.42 CHRONIC COMBINED SYSTOLIC AND DIASTOLIC CONGESTIVE HEART FAILURE: ICD-10-CM

## 2022-04-12 DIAGNOSIS — I50.43 ACUTE ON CHRONIC COMBINED SYSTOLIC AND DIASTOLIC CONGESTIVE HEART FAILURE, NYHA CLASS 4: ICD-10-CM

## 2022-04-12 LAB
ABO + RH BLD: NORMAL
ALBUMIN SERPL BCP-MCNC: 4.6 G/DL (ref 3.5–5.2)
ALP SERPL-CCNC: 86 U/L (ref 55–135)
ALT SERPL W/O P-5'-P-CCNC: 31 U/L (ref 10–44)
ANION GAP SERPL CALC-SCNC: 10 MMOL/L (ref 8–16)
ANION GAP SERPL CALC-SCNC: 15 MMOL/L (ref 8–16)
AST SERPL-CCNC: 34 U/L (ref 10–40)
BASOPHILS # BLD AUTO: 0.04 K/UL (ref 0–0.2)
BASOPHILS NFR BLD: 0.4 % (ref 0–1.9)
BILIRUB SERPL-MCNC: 1.2 MG/DL (ref 0.1–1)
BLD GP AB SCN CELLS X3 SERPL QL: NORMAL
BNP SERPL-MCNC: 798 PG/ML (ref 0–99)
BUN SERPL-MCNC: 17 MG/DL (ref 6–20)
BUN SERPL-MCNC: 18 MG/DL (ref 6–20)
CALCIUM SERPL-MCNC: 10.1 MG/DL (ref 8.7–10.5)
CALCIUM SERPL-MCNC: 8.6 MG/DL (ref 8.7–10.5)
CHLORIDE SERPL-SCNC: 105 MMOL/L (ref 95–110)
CHLORIDE SERPL-SCNC: 105 MMOL/L (ref 95–110)
CO2 SERPL-SCNC: 21 MMOL/L (ref 23–29)
CO2 SERPL-SCNC: 24 MMOL/L (ref 23–29)
CREAT SERPL-MCNC: 1.5 MG/DL (ref 0.5–1.4)
CREAT SERPL-MCNC: 2.1 MG/DL (ref 0.5–1.4)
DIFFERENTIAL METHOD: ABNORMAL
EOSINOPHIL # BLD AUTO: 0.1 K/UL (ref 0–0.5)
EOSINOPHIL NFR BLD: 0.6 % (ref 0–8)
ERYTHROCYTE [DISTWIDTH] IN BLOOD BY AUTOMATED COUNT: 16.3 % (ref 11.5–14.5)
EST. GFR  (AFRICAN AMERICAN): 45.1 ML/MIN/1.73 M^2
EST. GFR  (AFRICAN AMERICAN): >60 ML/MIN/1.73 M^2
EST. GFR  (NON AFRICAN AMERICAN): 39 ML/MIN/1.73 M^2
EST. GFR  (NON AFRICAN AMERICAN): 58.6 ML/MIN/1.73 M^2
GLUCOSE SERPL-MCNC: 122 MG/DL (ref 70–110)
GLUCOSE SERPL-MCNC: 210 MG/DL (ref 70–110)
HCT VFR BLD AUTO: 40.2 % (ref 40–54)
HGB BLD-MCNC: 13.4 G/DL (ref 14–18)
IMM GRANULOCYTES # BLD AUTO: 0.02 K/UL (ref 0–0.04)
IMM GRANULOCYTES NFR BLD AUTO: 0.2 % (ref 0–0.5)
INR PPP: 1.1 (ref 0.8–1.2)
LYMPHOCYTES # BLD AUTO: 2.7 K/UL (ref 1–4.8)
LYMPHOCYTES NFR BLD: 25.6 % (ref 18–48)
MAGNESIUM SERPL-MCNC: 1.8 MG/DL (ref 1.6–2.6)
MCH RBC QN AUTO: 31.2 PG (ref 27–31)
MCHC RBC AUTO-ENTMCNC: 33.3 G/DL (ref 32–36)
MCV RBC AUTO: 94 FL (ref 82–98)
MONOCYTES # BLD AUTO: 0.8 K/UL (ref 0.3–1)
MONOCYTES NFR BLD: 7.1 % (ref 4–15)
NEUTROPHILS # BLD AUTO: 7 K/UL (ref 1.8–7.7)
NEUTROPHILS NFR BLD: 66.1 % (ref 38–73)
NRBC BLD-RTO: 0 /100 WBC
PLATELET # BLD AUTO: 241 K/UL (ref 150–450)
PMV BLD AUTO: 9.5 FL (ref 9.2–12.9)
POTASSIUM SERPL-SCNC: 3.4 MMOL/L (ref 3.5–5.1)
POTASSIUM SERPL-SCNC: 3.7 MMOL/L (ref 3.5–5.1)
PROT SERPL-MCNC: 8.3 G/DL (ref 6–8.4)
PROTHROMBIN TIME: 11.2 SEC (ref 9–12.5)
RBC # BLD AUTO: 4.29 M/UL (ref 4.6–6.2)
SODIUM SERPL-SCNC: 139 MMOL/L (ref 136–145)
SODIUM SERPL-SCNC: 141 MMOL/L (ref 136–145)
WBC # BLD AUTO: 10.6 K/UL (ref 3.9–12.7)

## 2022-04-12 PROCEDURE — 80053 COMPREHEN METABOLIC PANEL: CPT | Mod: NTX | Performed by: NURSE PRACTITIONER

## 2022-04-12 PROCEDURE — 93005 ELECTROCARDIOGRAM TRACING: CPT | Mod: NTX

## 2022-04-12 PROCEDURE — 25000003 PHARM REV CODE 250: Mod: NTX | Performed by: NURSE PRACTITIONER

## 2022-04-12 PROCEDURE — 63600175 PHARM REV CODE 636 W HCPCS: Mod: NTX | Performed by: NURSE PRACTITIONER

## 2022-04-12 PROCEDURE — 25000003 PHARM REV CODE 250: Mod: NTX | Performed by: STUDENT IN AN ORGANIZED HEALTH CARE EDUCATION/TRAINING PROGRAM

## 2022-04-12 PROCEDURE — 99900035 HC TECH TIME PER 15 MIN (STAT): Mod: NTX

## 2022-04-12 PROCEDURE — 94761 N-INVAS EAR/PLS OXIMETRY MLT: CPT | Mod: NTX

## 2022-04-12 PROCEDURE — 85610 PROTHROMBIN TIME: CPT | Mod: NTX | Performed by: NURSE PRACTITIONER

## 2022-04-12 PROCEDURE — 36415 COLL VENOUS BLD VENIPUNCTURE: CPT | Mod: NTX | Performed by: NURSE PRACTITIONER

## 2022-04-12 PROCEDURE — 85025 COMPLETE CBC W/AUTO DIFF WBC: CPT | Mod: NTX | Performed by: NURSE PRACTITIONER

## 2022-04-12 PROCEDURE — 86850 RBC ANTIBODY SCREEN: CPT | Mod: NTX | Performed by: NURSE PRACTITIONER

## 2022-04-12 PROCEDURE — 83880 ASSAY OF NATRIURETIC PEPTIDE: CPT | Mod: NTX | Performed by: NURSE PRACTITIONER

## 2022-04-12 PROCEDURE — 83735 ASSAY OF MAGNESIUM: CPT | Mod: NTX | Performed by: NURSE PRACTITIONER

## 2022-04-12 PROCEDURE — 93010 ELECTROCARDIOGRAM REPORT: CPT | Mod: NTX,,, | Performed by: INTERNAL MEDICINE

## 2022-04-12 PROCEDURE — 25000003 PHARM REV CODE 250: Mod: NTX | Performed by: INTERNAL MEDICINE

## 2022-04-12 PROCEDURE — 20600001 HC STEP DOWN PRIVATE ROOM: Mod: NTX

## 2022-04-12 PROCEDURE — 80323 ALKALOIDS NOS: CPT | Mod: NTX | Performed by: NURSE PRACTITIONER

## 2022-04-12 PROCEDURE — 93010 EKG 12-LEAD: ICD-10-PCS | Mod: NTX,,, | Performed by: INTERNAL MEDICINE

## 2022-04-12 PROCEDURE — 80048 BASIC METABOLIC PNL TOTAL CA: CPT | Mod: NTX,XB | Performed by: NURSE PRACTITIONER

## 2022-04-12 RX ORDER — MILRINONE LACTATE 0.2 MG/ML
0.38 INJECTION, SOLUTION INTRAVENOUS CONTINUOUS
Status: DISCONTINUED | OUTPATIENT
Start: 2022-04-12 | End: 2022-04-25 | Stop reason: HOSPADM

## 2022-04-12 RX ORDER — MUPIROCIN 20 MG/G
OINTMENT TOPICAL 2 TIMES DAILY
Status: DISPENSED | OUTPATIENT
Start: 2022-04-12 | End: 2022-04-17

## 2022-04-12 RX ORDER — FUROSEMIDE 10 MG/ML
80 INJECTION INTRAMUSCULAR; INTRAVENOUS
Status: DISCONTINUED | OUTPATIENT
Start: 2022-04-12 | End: 2022-04-12

## 2022-04-12 RX ORDER — SODIUM CHLORIDE 0.9 % (FLUSH) 0.9 %
10 SYRINGE (ML) INJECTION
Status: DISCONTINUED | OUTPATIENT
Start: 2022-04-12 | End: 2022-04-25 | Stop reason: HOSPADM

## 2022-04-12 RX ORDER — HEPARIN SODIUM 5000 [USP'U]/ML
5000 INJECTION, SOLUTION INTRAVENOUS; SUBCUTANEOUS EVERY 8 HOURS
Status: DISPENSED | OUTPATIENT
Start: 2022-04-12 | End: 2022-04-23

## 2022-04-12 RX ORDER — LANOLIN ALCOHOL/MO/W.PET/CERES
400 CREAM (GRAM) TOPICAL 2 TIMES DAILY
Status: DISCONTINUED | OUTPATIENT
Start: 2022-04-12 | End: 2022-04-12

## 2022-04-12 RX ORDER — POTASSIUM CHLORIDE 20 MEQ/1
40 TABLET, EXTENDED RELEASE ORAL 2 TIMES DAILY
Status: DISCONTINUED | OUTPATIENT
Start: 2022-04-12 | End: 2022-04-12

## 2022-04-12 RX ORDER — NAPROXEN SODIUM 220 MG/1
81 TABLET, FILM COATED ORAL DAILY
Status: DISCONTINUED | OUTPATIENT
Start: 2022-04-13 | End: 2022-04-25 | Stop reason: HOSPADM

## 2022-04-12 RX ORDER — MIRTAZAPINE 15 MG/1
15 TABLET, FILM COATED ORAL NIGHTLY
Status: DISCONTINUED | OUTPATIENT
Start: 2022-04-12 | End: 2022-04-25 | Stop reason: HOSPADM

## 2022-04-12 RX ORDER — SUCRALFATE 1 G/1
1 TABLET ORAL NIGHTLY
Status: DISCONTINUED | OUTPATIENT
Start: 2022-04-12 | End: 2022-04-25 | Stop reason: HOSPADM

## 2022-04-12 RX ORDER — PANTOPRAZOLE SODIUM 40 MG/1
40 TABLET, DELAYED RELEASE ORAL DAILY
Status: DISCONTINUED | OUTPATIENT
Start: 2022-04-13 | End: 2022-04-25 | Stop reason: HOSPADM

## 2022-04-12 RX ORDER — ACETAMINOPHEN 325 MG/1
650 TABLET ORAL EVERY 6 HOURS PRN
Status: DISCONTINUED | OUTPATIENT
Start: 2022-04-12 | End: 2022-04-25 | Stop reason: HOSPADM

## 2022-04-12 RX ORDER — LANOLIN ALCOHOL/MO/W.PET/CERES
1 CREAM (GRAM) TOPICAL DAILY
Refills: 0 | Status: DISCONTINUED | OUTPATIENT
Start: 2022-04-13 | End: 2022-04-25 | Stop reason: HOSPADM

## 2022-04-12 RX ORDER — POTASSIUM CHLORIDE 20 MEQ/1
60 TABLET, EXTENDED RELEASE ORAL ONCE
Status: COMPLETED | OUTPATIENT
Start: 2022-04-12 | End: 2022-04-12

## 2022-04-12 RX ADMIN — HEPARIN SODIUM 5000 UNITS: 5000 INJECTION INTRAVENOUS; SUBCUTANEOUS at 02:04

## 2022-04-12 RX ADMIN — FUROSEMIDE 80 MG: 10 INJECTION, SOLUTION INTRAMUSCULAR; INTRAVENOUS at 02:04

## 2022-04-12 RX ADMIN — MUPIROCIN: 20 OINTMENT TOPICAL at 09:04

## 2022-04-12 RX ADMIN — MILRINONE LACTATE IN DEXTROSE 0.38 MCG/KG/MIN: 200 INJECTION, SOLUTION INTRAVENOUS at 03:04

## 2022-04-12 RX ADMIN — POTASSIUM CHLORIDE 60 MEQ: 20 TABLET, EXTENDED RELEASE ORAL at 02:04

## 2022-04-12 RX ADMIN — ACETAMINOPHEN 650 MG: 325 TABLET ORAL at 05:04

## 2022-04-12 RX ADMIN — BUSPIRONE HYDROCHLORIDE 7.5 MG: 5 TABLET ORAL at 09:04

## 2022-04-12 RX ADMIN — SACUBITRIL AND VALSARTAN 1 TABLET: 24; 26 TABLET, FILM COATED ORAL at 09:04

## 2022-04-12 RX ADMIN — Medication 400 MG: at 02:04

## 2022-04-12 RX ADMIN — ALTEPLASE 2 MG: 2.2 INJECTION, POWDER, LYOPHILIZED, FOR SOLUTION INTRAVENOUS at 03:04

## 2022-04-12 RX ADMIN — MIRTAZAPINE 15 MG: 15 TABLET, FILM COATED ORAL at 09:04

## 2022-04-12 RX ADMIN — HEPARIN SODIUM 5000 UNITS: 5000 INJECTION INTRAVENOUS; SUBCUTANEOUS at 09:04

## 2022-04-12 RX ADMIN — SUCRALFATE 1 G: 1 TABLET ORAL at 09:04

## 2022-04-12 NOTE — ASSESSMENT & PLAN NOTE
NIDCM , on home Milrinone 0.25 mcg/kg/min, S/P ICD, h/o polysubstance abuse, tox screen 4/8 -ve, admitted with ADHF and for consideration for advanced options w/u:    -RHC on Milrinone 0.25 mcg on 4/8: RA 15, PA 59/35 (43), W 33 and CO/CI 3.27/1.47  -TTE on Milrinone 0.25 mcg on 4/8: LVEDD 7, LVEF 10%, grade 3 diastolic dysfunction, RV severely dilated, mod to severely depressed, mod-severe MR, mild TR, PAS > 50 and IVC 8  - Admit labs show sCr 1.7 (baseline of ~ 1.6)  - Increase Milrinone to 0.375 mcg/kg/min  - Diurese with Lasix 80 mg IV bid  - Serial labs, keep K+ > 4.0 and Mg+ > 2.0  - GDMT; Hold Toprol given decompensation. Continue Entresto and Aldactone  - Step 1 of VAD only pathway started

## 2022-04-12 NOTE — PROGRESS NOTES
[facility] - Cardiology Stepdown  Heart Transplant  Progress Note    Patient Name: Kevan Queen  MRN: [id number]  Admission Date: 4/12/2022  Hospital Length of Stay: 0 days  Attending Physician: Angela Yang DO  Primary Care Provider: ORALIA Cline  Principal Problem:Acute on chronic systolic congestive heart failure    Subjective:     Past Medical History:   Diagnosis Date    Arthritis     Cardiomyopathy     CHF (congestive heart failure) 10/01/2020    Dilated cardiomyopathy 10/26/2020    Drug abuse 10/2020    Renal disorder        Past Surgical History:   Procedure Laterality Date    CARDIAC DEFIBRILLATOR PLACEMENT      RIGHT HEART CATHETERIZATION Right 4/8/2022    Procedure: INSERTION, CATHETER, RIGHT HEART;  Surgeon: Luca Lopez Jr., MD;  Location: [facility] CATH LAB;  Service: Cardiology;  Laterality: Right;       Review of patient's allergies indicates:   Allergen Reactions    Bumex [bumetanide] Hives    Lactose Diarrhea    Torsemide Hives       Current Facility-Administered Medications   Medication    alteplase injection 2 mg    [START ON 4/13/2022] aspirin chewable tablet 81 mg    busPIRone split tablet 7.5 mg    [START ON 4/13/2022] ferrous sulfate tablet 1 each    furosemide injection 80 mg    heparin (porcine) injection 5,000 Units    magnesium oxide tablet 400 mg    milrinone 20mg in D5W 100 mL infusion    mirtazapine tablet 15 mg    mupirocin 2 % ointment    [START ON 4/13/2022] pantoprazole EC tablet 40 mg    potassium chloride SA CR tablet 40 mEq    sacubitriL-valsartan 24-26 mg per tablet 1 tablet    sodium chloride 0.9% flush 10 mL    [START ON 4/13/2022] spironolactone split tablet 12.5 mg    sucralfate tablet 1 g     Family History       Problem Relation (Age of Onset)    Diverticulosis Brother    Heart attack Maternal Grandmother, Maternal Grandfather    Heart failure Father          Tobacco Use    Smoking status: Former Smoker     Packs/day: 0.50      Years: 16.00     Pack years: 8.00     Start date: 10/1/2004     Quit date: 10/1/2020     Years since quittin.5    Smokeless tobacco: Never Used   Substance and Sexual Activity    Alcohol use: Not Currently    Drug use: Not Currently     Types: Marijuana, MDMA (Ecstacy)    Sexual activity: Yes     Partners: Female     Birth control/protection: None     Review of Systems   Constitutional:  Positive for fatigue.   HENT: Negative.     Eyes: Negative.    Respiratory:  Positive for shortness of breath.    Cardiovascular: Negative.    Gastrointestinal:  Positive for abdominal distention.   Endocrine: Negative.    Genitourinary: Negative.    Musculoskeletal: Negative.    Skin: Negative.    Allergic/Immunologic: Negative.    Neurological: Negative.    Hematological: Negative.    Psychiatric/Behavioral: Negative.     Objective:     Vital Signs (Most Recent):  Temp: 98.1 °F (36.7 °C) (22 1205)  Pulse: 73 (22 1205)  Resp: 16 (22 1205)  BP: 111/69 (22 1511)  SpO2: 99 % (22 1205) Vital Signs (24h Range):  Temp:  [98.1 °F (36.7 °C)] 98.1 °F (36.7 °C)  Pulse:  [73] 73  Resp:  [16] 16  SpO2:  [99 %] 99 %  BP: (111-112)/(67-69) 111/69     Patient Vitals for the past 72 hrs (Last 3 readings):   Weight   22 1205 93.6 kg (206 lb 5.6 oz)     Body mass index is 25.79 kg/m².    No intake or output data in the 24 hours ending 22 1532    Physical Exam  Constitutional:       Appearance: Normal appearance.   HENT:      Head: Normocephalic and atraumatic.   Eyes:      Conjunctiva/sclera: Conjunctivae normal.      Pupils: Pupils are equal, round, and reactive to light.   Neck:      Comments: I don't appreciate elevated neck veins sitting on edge of bed with legs dangling  Cardiovascular:      Rate and Rhythm: Normal rate and regular rhythm.      Comments: Grade II/VI systolic murmur apex, + S3  Pulmonary:      Effort: Pulmonary effort is normal.      Breath sounds: Normal breath sounds.    Abdominal:      General: Bowel sounds are normal.      Comments: RUQ fullness   Musculoskeletal:         General: No swelling. Normal range of motion.      Cervical back: Normal range of motion and neck supple.   Skin:     General: Skin is warm and dry.      Capillary Refill: Capillary refill takes 2 to 3 seconds.   Neurological:      General: No focal deficit present.      Mental Status: He is alert and oriented to person, place, and time.   Psychiatric:         Mood and Affect: Mood normal.         Behavior: Behavior normal.         Thought Content: Thought content normal.         Judgment: Judgment normal.       Significant Labs:  CBC:  Recent Labs   Lab 04/08/22  1157 04/12/22  1304   WBC 11.08 10.60   RBC 4.39* 4.29*   HGB 13.6* 13.4*   HCT 41.5 40.2    241   MCV 95 94   MCH 31.0 31.2*   MCHC 32.8 33.3     BNP:  Recent Labs   Lab 04/12/22  1304   *     CMP:  Recent Labs   Lab 04/08/22  1157 04/12/22  1304   * 122*   CALCIUM 10.3 10.1   ALBUMIN 4.2 4.6   PROT 8.0 8.3    141   K 3.7 3.4*   CO2 26 21*    105   BUN 20 18   CREATININE 1.7* 1.5*   ALKPHOS 82 86   ALT 24 31   AST 24 34   BILITOT 0.8 1.2*      Coagulation:   Recent Labs   Lab 04/12/22  1304   INR 1.1     LDH:  No results for input(s): LDH in the last 72 hours.  Microbiology:  Microbiology Results (last 7 days)       ** No results found for the last 168 hours. **            I have reviewed all pertinent labs within the past 24 hours.    Diagnostic Results:  I have reviewed and interpreted all pertinent imaging results/findings within the past 24 hours.    Assessment and Plan:     36 yo BM with NICM, on home milrinone, hx of substance abuse who comes in for f/u. States that he has been doing well since last visit with Dr Lopez.Sleeps on 2 pillows. No PND anymore. Volume/weight has been stable. Great appetite. He did have to have his PICC line replaced as it got pulled out during sleep. He got his ICD placed last  month in [place].      He claims being clean of any drugs, alcoho or tobacco for a long time. He came with his fiance that he has been living with for the last 8 years. They have 2 children in common. He has a total of 9 kids. Tox screen done 2/4/22 was -ve, but nicotine/cotinine screen was +ve same day. He admits to occasional cigarette smoking.      Underwent risk assessment with an echo and RHC on 4/8, both done on Milrinone 0.25 mcg/kg/min:    TTE: LVEDD 7, L:VEF 10%, grade 3 diastolic dysfunction, RV severely enlarge and mod to severely depressed, mod to severe MR, mild TR, PAS > 50 and IVC 8    RHC: RA 15, PAP 59/35 (43), W 33 and CO/CI 3.27/1.47.    Current GDMT: Toprol 25 mg qd, Entresto 24-26 bid, Aldactone 12.5 mg qd. Takes Lasix 80 mg bid plus Metolazone 2.5 mg qod    He presents as a direct admit with AHD and for advanced options consideration            * Acute on chronic systolic congestive heart failure  NID , on home Milrinone 0.25 mcg/kg/min, S/P ICD, h/o polysubstance abuse, tox screen 4/8 -ve, admitted with ADHF and for consideration for advanced options w/u:    -RHC on Milrinone 0.25 mcg on 4/8: RA 15, PA 59/35 (43), W 33 and CO/CI 3.27/1.47  -TTE on Milrinone 0.25 mcg on 4/8: LVEDD 7, LVEF 10%, grade 3 diastolic dysfunction, RV severely dilated, mod to severely depressed, mod-severe MR, mild TR, PAS > 50 and IVC 8  - Admit labs show sCr 1.7 (baseline of ~ 1.6)  - Increase Milrinone to 0.375 mcg/kg/min  - Diurese with Lasix 80 mg IV bid  - Serial labs, keep K+ > 4.0 and Mg+ > 2.0  - GDMT; Hold Toprol given decompensation. Continue Entresto and Aldactone  - Step 1 of VAD only pathway started      Tobacco use  - Continues to smoke occasionally. WIll re check nicotine[place] [id number]  Heart Transplant  [facility] - Cardiology Stepdown   Notification Instructions: Patient will be notified of biopsy results. However, patient instructed to call the office if not contacted within 2 weeks.

## 2022-04-12 NOTE — Clinical Note
The PA catheter was repositioned to the main pulmonary artery. Hemodynamics were performed. Cardiac output was obtained at 4 L/min. O2 saturation was measured at 56%.  CO = 3.73   CI = 1.68

## 2022-04-12 NOTE — PLAN OF CARE
Erica had a chest xray, CT abd/pelvic, CT head completed today. CTS has been consulted for advanced options workup. K replaced (60 meq PO). Patient started on lasix 80mg IVP [id number]. VSS. Primacor gtt going at 0.375 mcg/kg/min x 93kg, 10.5ml/hr. Patient educated on fluid restriction and told he is almost over his fluid restriction. Patient verbalized understanding. VSS. Bed in lowest position. Call light in reach. Patient ambulating independently. Plan of care discussed with patient. Patient is free of fall/trauma/injury. Denies CP, SOB, . All questions addressed. Will continue to monitor  Patient is receiving tylenol q6hrs PRN. Last dose was  given at 1745. Next dose is available at  2345.

## 2022-04-12 NOTE — Clinical Note
The PA catheter was repositioned to the pulmonary wedge. Hemodynamics were performed. O2 saturation was measured at 71%.

## 2022-04-12 NOTE — PROGRESS NOTES
Patient admitted from clinic. Admit completed. Assessment completed. VSS. Tele box placed on patient. Patient currently on home infusion pump with primacor running. PICC line flushes but does not draw back. PICC line dressing change is due today. LUCÍA Cain notified.

## 2022-04-12 NOTE — Clinical Note
The right neck was prepped. The site was prepped with ChloraPrep. The patient was draped. The patient was positioned supine.

## 2022-04-12 NOTE — HPI
36 yo BM with NICM, on home milrinone, hx of substance abuse who comes in for f/u. States that he has been doing well since last visit with Dr Lopez.Sleeps on 2 pillows. No PND anymore. Volume/weight has been stable. Great appetite. He did have to have his PICC line replaced as it got pulled out during sleep. He got his ICD placed last month in [place].      He claims being clean of any drugs, alcoho or tobacco for a long time. He came with his fiance that he has been living with for the last 8 years. They have 2 children in common. He has a total of 9 kids. Tox screen done 2/4/22 was -ve, but nicotine/cotinine screen was +ve same day. He admits to occasional cigarette smoking.      Underwent risk assessment with an echo and RHC on 4/8, both done on Milrinone 0.25 mcg/kg/min:    TTE: LVEDD 7, L:VEF 10%, grade 3 diastolic dysfunction, RV severely enlarge and mod to severely depressed, mod to severe MR, mild TR, PAS > 50 and IVC 8    RHC: RA 15, PAP 59/35 (43), W 33 and CO/CI 3.27/1.47.    Current GDMT: Toprol 25 mg qd, Entresto 24-26 bid, Aldactone 12.5 mg qd. Takes Lasix 80 mg bid plus Metolazone 2.5 mg qod    He presents as a direct admit with AHD and for advanced options consideration

## 2022-04-12 NOTE — SUBJECTIVE & OBJECTIVE
Past Medical History:   Diagnosis Date    Arthritis     Cardiomyopathy     CHF (congestive heart failure) 10/01/2020    Dilated cardiomyopathy 10/26/2020    Drug abuse 10/2020    Renal disorder        Past Surgical History:   Procedure Laterality Date    CARDIAC DEFIBRILLATOR PLACEMENT      RIGHT HEART CATHETERIZATION Right 2022    Procedure: INSERTION, CATHETER, RIGHT HEART;  Surgeon: Luca Lopez Jr., MD;  Location: [facility] CATH LAB;  Service: Cardiology;  Laterality: Right;       Review of patient's allergies indicates:   Allergen Reactions    Bumex [bumetanide] Hives    Lactose Diarrhea    Torsemide Hives       Current Facility-Administered Medications   Medication    alteplase injection 2 mg    [START ON 2022] aspirin chewable tablet 81 mg    busPIRone split tablet 7.5 mg    [START ON 2022] ferrous sulfate tablet 1 each    furosemide injection 80 mg    heparin (porcine) injection 5,000 Units    magnesium oxide tablet 400 mg    milrinone 20mg in D5W 100 mL infusion    mirtazapine tablet 15 mg    mupirocin 2 % ointment    [START ON 2022] pantoprazole EC tablet 40 mg    potassium chloride SA CR tablet 40 mEq    sacubitriL-valsartan 24-26 mg per tablet 1 tablet    sodium chloride 0.9% flush 10 mL    [START ON 2022] spironolactone split tablet 12.5 mg    sucralfate tablet 1 g     Family History       Problem Relation (Age of Onset)    Diverticulosis Brother    Heart attack Maternal Grandmother, Maternal Grandfather    Heart failure Father          Tobacco Use    Smoking status: Former Smoker     Packs/day: 0.50     Years: 16.00     Pack years: 8.00     Start date: 10/1/2004     Quit date: 10/1/2020     Years since quittin.5    Smokeless tobacco: Never Used   Substance and Sexual Activity    Alcohol use: Not Currently    Drug use: Not Currently     Types: Marijuana, MDMA (Ecstacy)    Sexual activity: Yes     Partners: Female     Birth control/protection: None     Review of Systems    Constitutional:  Positive for fatigue.   HENT: Negative.     Eyes: Negative.    Respiratory:  Positive for shortness of breath.    Cardiovascular: Negative.    Gastrointestinal:  Positive for abdominal distention.   Endocrine: Negative.    Genitourinary: Negative.    Musculoskeletal: Negative.    Skin: Negative.    Allergic/Immunologic: Negative.    Neurological: Negative.    Hematological: Negative.    Psychiatric/Behavioral: Negative.     Objective:     Vital Signs (Most Recent):  Temp: 98.1 °F (36.7 °C) (04/12/22 1205)  Pulse: 73 (04/12/22 1205)  Resp: 16 (04/12/22 1205)  BP: 111/69 (04/12/22 1511)  SpO2: 99 % (04/12/22 1205) Vital Signs (24h Range):  Temp:  [98.1 °F (36.7 °C)] 98.1 °F (36.7 °C)  Pulse:  [73] 73  Resp:  [16] 16  SpO2:  [99 %] 99 %  BP: (111-112)/(67-69) 111/69     Patient Vitals for the past 72 hrs (Last 3 readings):   Weight   04/12/22 1205 93.6 kg (206 lb 5.6 oz)     Body mass index is 25.79 kg/m².    No intake or output data in the 24 hours ending 04/12/22 1532    Physical Exam  Constitutional:       Appearance: Normal appearance.   HENT:      Head: Normocephalic and atraumatic.   Eyes:      Conjunctiva/sclera: Conjunctivae normal.      Pupils: Pupils are equal, round, and reactive to light.   Neck:      Comments: I don't appreciate elevated neck veins sitting on edge of bed with legs dangling  Cardiovascular:      Rate and Rhythm: Normal rate and regular rhythm.      Comments: Grade II/VI systolic murmur apex, + S3  Pulmonary:      Effort: Pulmonary effort is normal.      Breath sounds: Normal breath sounds.   Abdominal:      General: Bowel sounds are normal.      Comments: RUQ fullness   Musculoskeletal:         General: No swelling. Normal range of motion.      Cervical back: Normal range of motion and neck supple.   Skin:     General: Skin is warm and dry.      Capillary Refill: Capillary refill takes 2 to 3 seconds.   Neurological:      General: No focal deficit present.      Mental  Status: He is alert and oriented to person, place, and time.   Psychiatric:         Mood and Affect: Mood normal.         Behavior: Behavior normal.         Thought Content: Thought content normal.         Judgment: Judgment normal.       Significant Labs:  CBC:  Recent Labs   Lab 04/08/22  1157 04/12/22  1304   WBC 11.08 10.60   RBC 4.39* 4.29*   HGB 13.6* 13.4*   HCT 41.5 40.2    241   MCV 95 94   MCH 31.0 31.2*   MCHC 32.8 33.3     BNP:  Recent Labs   Lab 04/12/22  1304   *     CMP:  Recent Labs   Lab 04/08/22  1157 04/12/22  1304   * 122*   CALCIUM 10.3 10.1   ALBUMIN 4.2 4.6   PROT 8.0 8.3    141   K 3.7 3.4*   CO2 26 21*    105   BUN 20 18   CREATININE 1.7* 1.5*   ALKPHOS 82 86   ALT 24 31   AST 24 34   BILITOT 0.8 1.2*      Coagulation:   Recent Labs   Lab 04/12/22  1304   INR 1.1     LDH:  No results for input(s): LDH in the last 72 hours.  Microbiology:  Microbiology Results (last 7 days)       ** No results found for the last 168 hours. **            I have reviewed all pertinent labs within the past 24 hours.    Diagnostic Results:  I have reviewed and interpreted all pertinent imaging results/findings within the past 24 hours.

## 2022-04-13 ENCOUNTER — PATIENT MESSAGE (OUTPATIENT)
Dept: TRANSPLANT | Facility: CLINIC | Age: 37
End: 2022-04-13
Payer: MEDICAID

## 2022-04-13 ENCOUNTER — TELEPHONE (OUTPATIENT)
Dept: TRANSPLANT | Facility: CLINIC | Age: 37
End: 2022-04-13
Payer: MEDICAID

## 2022-04-13 ENCOUNTER — SOCIAL WORK (OUTPATIENT)
Dept: TRANSPLANT | Facility: HOSPITAL | Age: 37
End: 2022-04-13
Payer: MEDICAID

## 2022-04-13 PROBLEM — I50.43 ACUTE ON CHRONIC COMBINED SYSTOLIC AND DIASTOLIC HEART FAILURE, NYHA CLASS 4: Status: ACTIVE | Noted: 2020-10-25

## 2022-04-13 LAB
ALBUMIN SERPL BCP-MCNC: 3.8 G/DL (ref 3.5–5.2)
ALLENS TEST: ABNORMAL
ALP SERPL-CCNC: 80 U/L (ref 55–135)
ALT SERPL W/O P-5'-P-CCNC: 28 U/L (ref 10–44)
AMPHET+METHAMPHET UR QL: NEGATIVE
ANION GAP SERPL CALC-SCNC: 11 MMOL/L (ref 8–16)
AST SERPL-CCNC: 32 U/L (ref 10–40)
BARBITURATES UR QL SCN>200 NG/ML: NEGATIVE
BASOPHILS # BLD AUTO: 0.04 K/UL (ref 0–0.2)
BASOPHILS NFR BLD: 0.5 % (ref 0–1.9)
BENZODIAZ UR QL SCN>200 NG/ML: NEGATIVE
BILIRUB SERPL-MCNC: 0.8 MG/DL (ref 0.1–1)
BUN SERPL-MCNC: 20 MG/DL (ref 6–20)
BUN SERPL-MCNC: 22 MG/DL (ref 6–20)
BUN SERPL-MCNC: 22 MG/DL (ref 6–20)
BZE UR QL SCN: NEGATIVE
CALCIUM SERPL-MCNC: 9.5 MG/DL (ref 8.7–10.5)
CALCIUM SERPL-MCNC: 9.6 MG/DL (ref 8.7–10.5)
CALCIUM SERPL-MCNC: 9.6 MG/DL (ref 8.7–10.5)
CANNABINOIDS UR QL SCN: NEGATIVE
CHLORIDE SERPL-SCNC: 103 MMOL/L (ref 95–110)
CHLORIDE SERPL-SCNC: 104 MMOL/L (ref 95–110)
CHLORIDE SERPL-SCNC: 104 MMOL/L (ref 95–110)
CO2 SERPL-SCNC: 23 MMOL/L (ref 23–29)
CO2 SERPL-SCNC: 23 MMOL/L (ref 23–29)
CO2 SERPL-SCNC: 24 MMOL/L (ref 23–29)
CREAT SERPL-MCNC: 1.6 MG/DL (ref 0.5–1.4)
CREAT SERPL-MCNC: 1.7 MG/DL (ref 0.5–1.4)
CREAT SERPL-MCNC: 1.7 MG/DL (ref 0.5–1.4)
CREAT UR-MCNC: 208 MG/DL (ref 23–375)
DIFFERENTIAL METHOD: ABNORMAL
EOSINOPHIL # BLD AUTO: 0.1 K/UL (ref 0–0.5)
EOSINOPHIL NFR BLD: 1.8 % (ref 0–8)
ERYTHROCYTE [DISTWIDTH] IN BLOOD BY AUTOMATED COUNT: 16.2 % (ref 11.5–14.5)
EST. GFR  (AFRICAN AMERICAN): 58.3 ML/MIN/1.73 M^2
EST. GFR  (AFRICAN AMERICAN): 58.3 ML/MIN/1.73 M^2
EST. GFR  (AFRICAN AMERICAN): >60 ML/MIN/1.73 M^2
EST. GFR  (NON AFRICAN AMERICAN): 50.4 ML/MIN/1.73 M^2
EST. GFR  (NON AFRICAN AMERICAN): 50.4 ML/MIN/1.73 M^2
EST. GFR  (NON AFRICAN AMERICAN): 54.2 ML/MIN/1.73 M^2
ETHANOL UR-MCNC: <10 MG/DL
GLUCOSE SERPL-MCNC: 155 MG/DL (ref 70–110)
GLUCOSE SERPL-MCNC: 155 MG/DL (ref 70–110)
GLUCOSE SERPL-MCNC: 225 MG/DL (ref 70–110)
HCO3 UR-SCNC: 24.3 MMOL/L (ref 24–28)
HCO3 UR-SCNC: 25.6 MMOL/L (ref 24–28)
HCO3 UR-SCNC: 26.3 MMOL/L (ref 24–28)
HCT VFR BLD AUTO: 38.5 % (ref 40–54)
HGB BLD-MCNC: 12.4 G/DL (ref 14–18)
IMM GRANULOCYTES # BLD AUTO: 0.02 K/UL (ref 0–0.04)
IMM GRANULOCYTES NFR BLD AUTO: 0.3 % (ref 0–0.5)
LACTATE SERPL-SCNC: 1.4 MMOL/L (ref 0.5–2.2)
LYMPHOCYTES # BLD AUTO: 1.9 K/UL (ref 1–4.8)
LYMPHOCYTES NFR BLD: 24.9 % (ref 18–48)
MAGNESIUM SERPL-MCNC: 2.1 MG/DL (ref 1.6–2.6)
MAGNESIUM SERPL-MCNC: 2.2 MG/DL (ref 1.6–2.6)
MCH RBC QN AUTO: 30.9 PG (ref 27–31)
MCHC RBC AUTO-ENTMCNC: 32.2 G/DL (ref 32–36)
MCV RBC AUTO: 96 FL (ref 82–98)
METHADONE UR QL SCN>300 NG/ML: NEGATIVE
MONOCYTES # BLD AUTO: 0.6 K/UL (ref 0.3–1)
MONOCYTES NFR BLD: 8.3 % (ref 4–15)
NEUTROPHILS # BLD AUTO: 5 K/UL (ref 1.8–7.7)
NEUTROPHILS NFR BLD: 64.2 % (ref 38–73)
NRBC BLD-RTO: 0 /100 WBC
OPIATES UR QL SCN: NEGATIVE
PCO2 BLDA: 49 MMHG (ref 35–45)
PCO2 BLDA: 49.2 MMHG (ref 35–45)
PCO2 BLDA: 49.8 MMHG (ref 35–45)
PCP UR QL SCN>25 NG/ML: NEGATIVE
PH SMN: 7.3 [PH] (ref 7.35–7.45)
PH SMN: 7.32 [PH] (ref 7.35–7.45)
PH SMN: 7.33 [PH] (ref 7.35–7.45)
PLATELET # BLD AUTO: 239 K/UL (ref 150–450)
PMV BLD AUTO: 9.9 FL (ref 9.2–12.9)
PO2 BLDA: 27 MMHG (ref 40–60)
PO2 BLDA: 28 MMHG (ref 40–60)
PO2 BLDA: 36 MMHG (ref 40–60)
POC BE: -2 MMOL/L
POC BE: 0 MMOL/L
POC BE: 0 MMOL/L
POC SATURATED O2: 45 % (ref 95–100)
POC SATURATED O2: 48 % (ref 95–100)
POC SATURATED O2: 63 % (ref 95–100)
POC TCO2: 26 MMOL/L (ref 24–29)
POC TCO2: 27 MMOL/L (ref 24–29)
POC TCO2: 28 MMOL/L (ref 24–29)
POCT GLUCOSE: 161 MG/DL (ref 70–110)
POTASSIUM SERPL-SCNC: 3.6 MMOL/L (ref 3.5–5.1)
POTASSIUM SERPL-SCNC: 3.6 MMOL/L (ref 3.5–5.1)
POTASSIUM SERPL-SCNC: 4 MMOL/L (ref 3.5–5.1)
PROT SERPL-MCNC: 7.3 G/DL (ref 6–8.4)
RBC # BLD AUTO: 4.01 M/UL (ref 4.6–6.2)
SAMPLE: ABNORMAL
SITE: ABNORMAL
SODIUM SERPL-SCNC: 138 MMOL/L (ref 136–145)
T4 FREE SERPL-MCNC: 0.96 NG/DL (ref 0.71–1.51)
TOXICOLOGY INFORMATION: NORMAL
TSH SERPL DL<=0.005 MIU/L-ACNC: 1.55 UIU/ML (ref 0.4–4)
WBC # BLD AUTO: 7.71 K/UL (ref 3.9–12.7)

## 2022-04-13 PROCEDURE — 99900035 HC TECH TIME PER 15 MIN (STAT): Mod: NTX

## 2022-04-13 PROCEDURE — 97165 OT EVAL LOW COMPLEX 30 MIN: CPT | Mod: NTX

## 2022-04-13 PROCEDURE — 80307 DRUG TEST PRSMV CHEM ANLYZR: CPT | Mod: NTX | Performed by: NURSE PRACTITIONER

## 2022-04-13 PROCEDURE — 85025 COMPLETE CBC W/AUTO DIFF WBC: CPT | Mod: NTX | Performed by: NURSE PRACTITIONER

## 2022-04-13 PROCEDURE — 83735 ASSAY OF MAGNESIUM: CPT | Mod: NTX | Performed by: NURSE PRACTITIONER

## 2022-04-13 PROCEDURE — 63600175 PHARM REV CODE 636 W HCPCS: Mod: NTX | Performed by: NURSE PRACTITIONER

## 2022-04-13 PROCEDURE — 94761 N-INVAS EAR/PLS OXIMETRY MLT: CPT | Mod: NTX

## 2022-04-13 PROCEDURE — 99233 PR SUBSEQUENT HOSPITAL CARE,LEVL III: ICD-10-PCS | Mod: NTX,,, | Performed by: THORACIC SURGERY (CARDIOTHORACIC VASCULAR SURGERY)

## 2022-04-13 PROCEDURE — 82803 BLOOD GASES ANY COMBINATION: CPT | Mod: NTX

## 2022-04-13 PROCEDURE — 99233 SBSQ HOSP IP/OBS HIGH 50: CPT | Mod: NTX,,, | Performed by: THORACIC SURGERY (CARDIOTHORACIC VASCULAR SURGERY)

## 2022-04-13 PROCEDURE — 80053 COMPREHEN METABOLIC PANEL: CPT | Mod: NTX | Performed by: NURSE PRACTITIONER

## 2022-04-13 PROCEDURE — 20600001 HC STEP DOWN PRIVATE ROOM: Mod: NTX

## 2022-04-13 PROCEDURE — 97161 PT EVAL LOW COMPLEX 20 MIN: CPT | Mod: NTX

## 2022-04-13 PROCEDURE — 83605 ASSAY OF LACTIC ACID: CPT | Mod: NTX | Performed by: NURSE PRACTITIONER

## 2022-04-13 PROCEDURE — 25000003 PHARM REV CODE 250: Mod: NTX | Performed by: NURSE PRACTITIONER

## 2022-04-13 PROCEDURE — 84439 ASSAY OF FREE THYROXINE: CPT | Mod: NTX | Performed by: NURSE PRACTITIONER

## 2022-04-13 PROCEDURE — 84443 ASSAY THYROID STIM HORMONE: CPT | Mod: NTX | Performed by: NURSE PRACTITIONER

## 2022-04-13 PROCEDURE — 99233 SBSQ HOSP IP/OBS HIGH 50: CPT | Mod: NTX,,, | Performed by: NURSE PRACTITIONER

## 2022-04-13 PROCEDURE — 99233 PR SUBSEQUENT HOSPITAL CARE,LEVL III: ICD-10-PCS | Mod: NTX,,, | Performed by: NURSE PRACTITIONER

## 2022-04-13 PROCEDURE — 80048 BASIC METABOLIC PNL TOTAL CA: CPT | Mod: NTX,XB | Performed by: NURSE PRACTITIONER

## 2022-04-13 RX ORDER — INSULIN ASPART 100 [IU]/ML
0-5 INJECTION, SOLUTION INTRAVENOUS; SUBCUTANEOUS
Status: DISCONTINUED | OUTPATIENT
Start: 2022-04-13 | End: 2022-04-25 | Stop reason: HOSPADM

## 2022-04-13 RX ORDER — IBUPROFEN 200 MG
24 TABLET ORAL
Status: DISCONTINUED | OUTPATIENT
Start: 2022-04-13 | End: 2022-04-25 | Stop reason: HOSPADM

## 2022-04-13 RX ORDER — POTASSIUM CHLORIDE 20 MEQ/1
40 TABLET, EXTENDED RELEASE ORAL ONCE
Status: COMPLETED | OUTPATIENT
Start: 2022-04-13 | End: 2022-04-13

## 2022-04-13 RX ORDER — IBUPROFEN 200 MG
16 TABLET ORAL
Status: DISCONTINUED | OUTPATIENT
Start: 2022-04-13 | End: 2022-04-25 | Stop reason: HOSPADM

## 2022-04-13 RX ORDER — GLUCAGON 1 MG
1 KIT INJECTION
Status: DISCONTINUED | OUTPATIENT
Start: 2022-04-13 | End: 2022-04-25 | Stop reason: HOSPADM

## 2022-04-13 RX ADMIN — SACUBITRIL AND VALSARTAN 1 TABLET: 24; 26 TABLET, FILM COATED ORAL at 09:04

## 2022-04-13 RX ADMIN — SUCRALFATE 1 G: 1 TABLET ORAL at 10:04

## 2022-04-13 RX ADMIN — MILRINONE LACTATE IN DEXTROSE 0.38 MCG/KG/MIN: 200 INJECTION, SOLUTION INTRAVENOUS at 07:04

## 2022-04-13 RX ADMIN — MUPIROCIN: 20 OINTMENT TOPICAL at 10:04

## 2022-04-13 RX ADMIN — SPIRONOLACTONE 12.5 MG: 25 TABLET, FILM COATED ORAL at 09:04

## 2022-04-13 RX ADMIN — BUSPIRONE HYDROCHLORIDE 7.5 MG: 5 TABLET ORAL at 09:04

## 2022-04-13 RX ADMIN — BUSPIRONE HYDROCHLORIDE 7.5 MG: 5 TABLET ORAL at 10:04

## 2022-04-13 RX ADMIN — FERROUS SULFATE TAB 325 MG (65 MG ELEMENTAL FE) 1 EACH: 325 (65 FE) TAB at 09:04

## 2022-04-13 RX ADMIN — PANTOPRAZOLE SODIUM 40 MG: 40 TABLET, DELAYED RELEASE ORAL at 09:04

## 2022-04-13 RX ADMIN — POTASSIUM CHLORIDE 40 MEQ: 20 TABLET, EXTENDED RELEASE ORAL at 09:04

## 2022-04-13 RX ADMIN — ASPIRIN 81 MG CHEWABLE TABLET 81 MG: 81 TABLET CHEWABLE at 09:04

## 2022-04-13 RX ADMIN — SACUBITRIL AND VALSARTAN 1 TABLET: 24; 26 TABLET, FILM COATED ORAL at 10:04

## 2022-04-13 RX ADMIN — MILRINONE LACTATE IN DEXTROSE 0.38 MCG/KG/MIN: 200 INJECTION, SOLUTION INTRAVENOUS at 10:04

## 2022-04-13 RX ADMIN — HEPARIN SODIUM 5000 UNITS: 5000 INJECTION INTRAVENOUS; SUBCUTANEOUS at 10:04

## 2022-04-13 RX ADMIN — HEPARIN SODIUM 5000 UNITS: 5000 INJECTION INTRAVENOUS; SUBCUTANEOUS at 01:04

## 2022-04-13 RX ADMIN — HEPARIN SODIUM 5000 UNITS: 5000 INJECTION INTRAVENOUS; SUBCUTANEOUS at 06:04

## 2022-04-13 RX ADMIN — MILRINONE LACTATE IN DEXTROSE 0.38 MCG/KG/MIN: 200 INJECTION, SOLUTION INTRAVENOUS at 02:04

## 2022-04-13 RX ADMIN — MIRTAZAPINE 15 MG: 15 TABLET, FILM COATED ORAL at 10:04

## 2022-04-13 NOTE — TELEPHONE ENCOUNTER
Pt on Step 1 on inpatient eval for adv heart options.  Requested financial clearance for inpt OHT and VAD

## 2022-04-13 NOTE — PLAN OF CARE
K replaced (40meq). Urine tox collected. Awaiting results. Primacor gtt continued. CVP 10, SVO2 63. Lactate 1.4. Patient is a daily CVP and SVO2. Patient educated on diet and fluid restriction multiple times. Patient verbalizes understanding, but this educating needs to be keep being reinforced. Patient goes to [employer] to get food and the vending machine for drinks. VSS. Bed in lowest position. Call light in reach. Patient ambulating independently. . Plan of care discussed with patient. Patient is free of fall/trauma/injury. Denies CP, SOB, or pain/discomfort. All questions addressed. Will continue to monitor

## 2022-04-13 NOTE — ASSESSMENT & PLAN NOTE
NIDCM , on home Milrinone 0.25 mcg/kg/min, S/P ICD, h/o polysubstance abuse, tox screen 4/8 -ve, admitted with ADHF and for consideration for advanced options w/u:    -RHC on Milrinone 0.25 mcg on 4/8: RA 15, PA 59/35 (43), W 33 and CO/CI 3.27/1.47  -TTE on Milrinone 0.25 mcg on 4/8: LVEDD 7, LVEF 10%, grade 3 diastolic dysfunction, RV severely dilated, mod to severely depressed, mod-severe MR, mild TR, PAS > 50 and IVC 8  - Admit labs showed sCr 1.7 (baseline of ~ 1.6)  - Increased Milrinone to 0.375 mcg/kg/min on admit. Awaiting sVO2  - IVP Lasix D/C'd last night 2/2 bump in sCr to 2.1, now back down to 1.7  - Serial labs, keep K+ > 4.0 and Mg+ > 2.0  - GDMT; Hold Toprol given decompensation. Continue Entresto and Aldactone  - Step 1 of VAD only pathway started (still smokes)

## 2022-04-13 NOTE — PT/OT/SLP EVAL
Occupational Therapy   Evaluation and Discharge Note    Name: Kevan Queen  MRN: [id number]  Admitting Diagnosis:  Acute on chronic combined systolic and diastolic heart failure, NYHA class 4   Recent Surgery: * No surgery found *      Recommendations:     Discharge Recommendations: home  Discharge Equipment Recommendations:  none  Barriers to discharge:       Assessment:     Kevan Queen is a 37 y.o. male with a medical diagnosis of Acute on chronic combined systolic and diastolic heart failure, NYHA class 4. At this time, patient is functioning at their prior level of function and does not require further acute OT services.     Plan:     During this hospitalization, patient does not require further acute OT services.  Please re-consult if situation changes.    · Plan of Care Reviewed with: patient    Subjective     Occupational Profile:  Living Environment: Pt lives with his fiance/kids/mother in a 2SH with B/B downstairs; T/S.  Previous level of function: Independent  Equipment Used at home:  none  Assistance upon Discharge: family    Pain/Comfort:  · Pain Rating 1: 0/10    Patients cultural, spiritual, Mosque conflicts given the current situation:      Objective:     Communicated with: rn prior to session.  Patient found EOB with peripheral IV upon OT entry to room.    General Precautions: Standard,     Orthopedic Precautions:    Braces:    Respiratory Status: Room air     Occupational Performance:    Bed Mobility:     Independent    Functional Mobility/Transfers:   Independent    Activities of Daily Living:   Independent    Physical Exam:  BUE AROM/MMT: WNL    AMPAC 6 Click ADL:  AMPAC Total Score: 24    Treatment & Education:  UE ROM/MMT  Bed mobility training / assessment  Functional mobility assessment  Sit/standing balance assessment  Educated on importance of sitting OOB in bedside chair to promote increased strength, endurance & breathing.  Discussed D/C of OT  Education:    Patient left supine with  all lines intact and call button in reach    GOALS:   Multidisciplinary Problems     Occupational Therapy Goals     Not on file                History:     Past Medical History:   Diagnosis Date    Arthritis     Cardiomyopathy     CHF (congestive heart failure) 10/01/2020    Dilated cardiomyopathy 10/26/2020    Drug abuse 10/2020    Renal disorder        Past Surgical History:   Procedure Laterality Date    CARDIAC DEFIBRILLATOR PLACEMENT      RIGHT HEART CATHETERIZATION Right 4/8/2022    Procedure: INSERTION, CATHETER, RIGHT HEART;  Surgeon: Luca Lopez Jr., MD;  Location: [facility] CATH LAB;  Service: Cardiology;  Laterality: Right;       Time Tracking:     OT Date of Treatment: 04/13/22  OT Start Time: 0839  OT Stop Time: 0844  OT Total Time (min): 5 min    Billable Minutes:Evaluation 5    4/13/2022

## 2022-04-13 NOTE — H&P
[facility] - Cardiology Stepdown  Heart Transplant  H&P    Patient Name: Kevan Queen  MRN: [id number]  Admission Date: 4/12/2022  Attending Physician: Angela Yang DO  Primary Care Provider: ORALIA Cline  Principal Problem:Acute on chronic combined systolic and diastolic heart failure, NYHA class 4    Subjective:     History of Present Illness:  36 yo BM with NICM, on home milrinone, hx of substance abuse who comes in for f/u. States that he has been doing well since last visit with Dr Lopez.Sleeps on 2 pillows. No PND anymore. Volume/weight has been stable. Great appetite. He did have to have his PICC line replaced as it got pulled out during sleep. He got his ICD placed last month in [place].      He claims being clean of any drugs, alcoho or tobacco for a long time. He came with his fiance that he has been living with for the last 8 years. They have 2 children in common. He has a total of 9 kids. Tox screen done 2/4/22 was -ve, but nicotine/cotinine screen was +ve same day. He admits to occasional cigarette smoking.      Underwent risk assessment with an echo and RHC on 4/8, both done on Milrinone 0.25 mcg/kg/min:    TTE: LVEDD 7, L:VEF 10%, grade 3 diastolic dysfunction, RV severely enlarge and mod to severely depressed, mod to severe MR, mild TR, PAS > 50 and IVC 8    RHC: RA 15, PAP 59/35 (43), W 33 and CO/CI 3.27/1.47.    Current GDMT: Toprol 25 mg qd, Entresto 24-26 bid, Aldactone 12.5 mg qd. Takes Lasix 80 mg bid plus Metolazone 2.5 mg qod    He presents as a direct admit with AHD and for advanced options consideration            Past Medical History:   Diagnosis Date    Arthritis     Cardiomyopathy     CHF (congestive heart failure) 10/01/2020    Dilated cardiomyopathy 10/26/2020    Drug abuse 10/2020    Renal disorder        Past Surgical History:   Procedure Laterality Date    CARDIAC DEFIBRILLATOR PLACEMENT      RIGHT HEART CATHETERIZATION Right 4/8/2022    Procedure:  INSERTION, CATHETER, RIGHT HEART;  Surgeon: Luca Lopez Jr., MD;  Location: [facility] CATH LAB;  Service: Cardiology;  Laterality: Right;       Review of patient's allergies indicates:   Allergen Reactions    Bumex [bumetanide] Hives    Lactose Diarrhea    Torsemide Hives       Current Facility-Administered Medications   Medication    acetaminophen tablet 650 mg    aspirin chewable tablet 81 mg    busPIRone split tablet 7.5 mg    dextrose 10% bolus 125 mL    dextrose 10% bolus 250 mL    ferrous sulfate tablet 1 each    glucagon (human recombinant) injection 1 mg    glucose chewable tablet 16 g    glucose chewable tablet 24 g    heparin (porcine) injection 5,000 Units    insulin aspart U-100 pen 0-5 Units    milrinone 20mg in D5W 100 mL infusion    mirtazapine tablet 15 mg    mupirocin 2 % ointment    pantoprazole EC tablet 40 mg    sacubitriL-valsartan 24-26 mg per tablet 1 tablet    sodium chloride 0.9% flush 10 mL    spironolactone split tablet 12.5 mg    sucralfate tablet 1 g     Family History       Problem Relation (Age of Onset)    Diverticulosis Brother    Heart attack Maternal Grandmother, Maternal Grandfather    Heart failure Father          Tobacco Use    Smoking status: Former Smoker     Packs/day: 0.50     Years: 16.00     Pack years: 8.00     Start date: 10/1/2004     Quit date: 10/1/2020     Years since quittin.5    Smokeless tobacco: Never Used   Substance and Sexual Activity    Alcohol use: Not Currently    Drug use: Not Currently     Types: Marijuana, MDMA (Ecstacy)    Sexual activity: Yes     Partners: Female     Birth control/protection: None     Review of Systems   Constitutional:  Positive for fatigue.   HENT: Negative.     Eyes: Negative.    Respiratory:  Positive for shortness of breath.    Cardiovascular: Negative.    Gastrointestinal:  Positive for abdominal distention.   Endocrine: Negative.    Genitourinary: Negative.    Musculoskeletal: Negative.     Allergic/Immunologic: Negative.    Neurological: Negative.    Hematological: Negative.    Psychiatric/Behavioral: Negative.     Objective:     Vital Signs (Most Recent):  Temp: 97.7 °F (36.5 °C) (04/13/22 0752)  Pulse: 100 (04/13/22 0752)  Resp: 18 (04/13/22 0752)  BP: 109/65 (04/13/22 0752)  SpO2: 95 % (04/13/22 0752) Vital Signs (24h Range):  Temp:  [97 °F (36.1 °C)-98.1 °F (36.7 °C)] 97.7 °F (36.5 °C)  Pulse:  [] 100  Resp:  [16-18] 18  SpO2:  [93 %-99 %] 95 %  BP: ()/(48-75) 109/65     Patient Vitals for the past 72 hrs (Last 3 readings):   Weight   04/13/22 0800 97.5 kg (214 lb 15.2 oz)   04/12/22 1205 93.6 kg (206 lb 5.6 oz)     Body mass index is 26.87 kg/m².      Intake/Output Summary (Last 24 hours) at 4/13/2022 1110  Last data filed at 4/13/2022 0600  Gross per 24 hour   Intake 1832.75 ml   Output 2350 ml   Net -517.25 ml       Physical Exam  Constitutional:       Appearance: Normal appearance.   HENT:      Head: Normocephalic and atraumatic.   Eyes:      Conjunctiva/sclera: Conjunctivae normal.      Pupils: Pupils are equal, round, and reactive to light.   Neck:      Comments: I don't appreciate elevated neck veins sitting on edge of bed with legs dangling  Cardiovascular:      Rate and Rhythm: Normal rate and regular rhythm.      Comments: Grade II/VI systolic murmur apex, +S3  Pulmonary:      Effort: Pulmonary effort is normal.      Breath sounds: Normal breath sounds.   Abdominal:      General: Bowel sounds are normal.      Comments: RUQ fullness   Musculoskeletal:         General: No swelling. Normal range of motion.   Skin:     General: Skin is warm and dry.      Capillary Refill: Capillary refill takes 2 to 3 seconds.   Neurological:      General: No focal deficit present.      Mental Status: He is alert and oriented to person, place, and time.   Psychiatric:         Mood and Affect: Mood normal.         Behavior: Behavior normal.         Thought Content: Thought content normal.          Judgment: Judgment normal.       Significant Labs:  CBC:  Recent Labs   Lab 04/08/22  1157 04/12/22  1304 04/13/22  0444   WBC 11.08 10.60 7.71   RBC 4.39* 4.29* 4.01*   HGB 13.6* 13.4* 12.4*   HCT 41.5 40.2 38.5*    241 239   MCV 95 94 96   MCH 31.0 31.2* 30.9   MCHC 32.8 33.3 32.2     BNP:  Recent Labs   Lab 04/12/22  1304   *     CMP:  Recent Labs   Lab 04/08/22  1157 04/12/22  1304 04/12/22  1754 04/13/22  0444   * 122* 210* 155*  155*   CALCIUM 10.3 10.1 8.6* 9.6  9.6   ALBUMIN 4.2 4.6  --  3.8   PROT 8.0 8.3  --  7.3    141 139 138  138   K 3.7 3.4* 3.7 3.6  3.6   CO2 26 21* 24 23  23    105 105 104  104   BUN 20 18 17 22*  22*   CREATININE 1.7* 1.5* 2.1* 1.7*  1.7*   ALKPHOS 82 86  --  80   ALT 24 31  --  28   AST 24 34  --  32   BILITOT 0.8 1.2*  --  0.8      Coagulation:   Recent Labs   Lab 04/12/22  1304   INR 1.1     LDH:  No results for input(s): LDH in the last 72 hours.  Microbiology:  Microbiology Results (last 7 days)       ** No results found for the last 168 hours. **            I have reviewed all pertinent labs within the past 24 hours.    Diagnostic Results:  I have reviewed all pertinent imaging results/findings within the past 24 hours.    Assessment/Plan:     * Acute on chronic combined systolic and diastolic heart failure, NYHA class 4  NIDCM , on home Milrinone 0.25 mcg/kg/min, S/P ICD, h/o polysubstance abuse, tox screen 4/8 -ve, admitted with ADHF and for consideration for advanced options w/u:    -RHC on Milrinone 0.25 mcg on 4/8: RA 15, PA 59/35 (43), W 33 and CO/CI 3.27/1.47  -TTE on Milrinone 0.25 mcg on 4/8: LVEDD 7, LVEF 10%, grade 3 diastolic dysfunction, RV severely dilated, mod to severely depressed, mod-severe MR, mild TR, PAS > 50 and IVC 8  - Admit labs showed sCr 1.7 (baseline of ~ 1.6)  - Increased Milrinone to 0.375 mcg/kg/min on admit. Awaiting sVO2  - IVP Lasix D/C'd last night 2/2 bump in sCr to 2.1, now back down to 1.7  -  Serial labs, keep K+ > 4.0 and Mg+ > 2.0  - GDMT; Hold Toprol given decompensation. Continue Entresto and Aldactone  - Step 1 of VAD only pathway started (still smokes)      Tobacco use  - Continues to smoke occasionally. WIll re check nicotine/cotinine      Alana Cain, LUCÍA  Heart Transplant  [facility] - Cardiology Stepdown

## 2022-04-13 NOTE — HPI
Mr. Kevan Queen is a 37 year old male with a medical histroy significnat for NICM and former polysubstance abuse. He is followed by the heart failure team.  He arrived for a RHC and was admitted for evaluation of advanced options for heart failure.  CTS is being consulted for surgical portion of the advanced options evaluation.

## 2022-04-13 NOTE — ASSESSMENT & PLAN NOTE
Mr. Kevan Queen is a 37 year old male with a medical histroy significnat for NICM and former polysubstance abuse. He is followed by the heart failure team.  He arrived for a RHC and was admitted for evaluation of advanced options for heart failure.  CTS is being consulted for surgical portion of the advanced options evaluation.  LVIDD 7, TASPE 1.38, blood group A+.  CT scan reviewed and does not preclude patient from advanced options.  Okay to continue eval for LVADA/OHT but given favorable blood group, would continue consider OHT.

## 2022-04-13 NOTE — PT/OT/SLP EVAL
"Physical Therapy Evaluation and Discharge Note  Co-evaluation with OT due to acuity of condition, level of skilled assist needed for assessment of safety with mobility.   Patient Name:  Kevan Queen   MRN:  [id number]    Recommendations:     Discharge Recommendations:  home   Discharge Equipment Recommendations: none   Barriers to discharge: None    Assessment:     Kevan Queen is a 37 y.o. male admitted with a medical diagnosis of Acute on chronic combined systolic and diastolic heart failure, NYHA class 4. .  At this time, patient is functioning at their prior level of function and does not require further acute PT services. He is independent with gait 300', no AD used.   Recent Surgery: * No surgery found *      Plan:     During this hospitalization, patient does not require further acute PT services.  Please re-consult if situation changes.      Subjective     Chief Complaint: "I'm doing fine, I just get out of breath sometimes"  Patient/Family Comments/goals: motivated to maintain independence with mobility  Pain/Comfort:  · Pain Rating 1: 0/10    Patients cultural, spiritual, Adventist conflicts given the current situation: no    Living Environment:  The patient lives with his fiancee and kids, in a 2SH (bedroom on the first floor) with 0 MILY. Tub-shower. Drives, does not work.   Prior to admission, patients level of function was independent.  Equipment used at home: none.  DME owned (not currently used): none.  Upon discharge, patient will have assistance from family.    Objective:     Communicated with RN prior to session.  Patient found sitting edge of bed with peripheral IV, telemetry upon PT entry to room.    General Precautions: Standard, fall   Orthopedic Precautions:N/A   Braces: N/A   Respiratory Status: Room air    Exams:    Cognitive Exam  Patient is A&O x4 and follows 100% of one -step commands    Fine Motor Coordination   -       WNL     Postural Exam Patient presented with the following " abnormalities:    -       Rounded shoulders  -       Forward head   Sensation    -       Light touch intact REECE LE   Skin Integrity/Edema     -       Skin integrity: visibly intact  -       Edema: 1+ REECE lower legs, ankles, feet   R LE ROM WNL   R LE Strength WNL   L LE ROM WNL   L LE Strength  WNL       Balance   Static Sitting independent    Dynamic Sitting independent    Static Standing independent    Dynamic Standing       independent           Functional Mobility:    Bed Mobility  Deferred, found sitting edge of bed   Transfers Sit to Stand:  independent    Gait  Gait Distance: 300 ft with no Ad  Assistance Level: independent  Description: reciprocal strides, good gait speed          AM-PAC 6 CLICK MOBILITY  Total Score:24       Therapeutic Activities and Exercises:   Patient educated on role of therapy, goals of session, benefits of out of bed mobility. Patient agreeable to mobilize with therapy.  Discussed PT plan of care during hospitalization. Patient educated that they need to call for assistance to mobilize out of bed. Whiteboard updated as appropriate. Patient educated on how their diagnosis impacts their mobility within PT scope of practice.     Patient educated on PT schedule.  Encouraged patient to ambulate, sit up in chair 3x/day to prevent deconditioning during hospitalization. Patient verbalized understanding and agreement not to mobilize without RN assist. Patient in agreement with PT POC, Home no therapy needs, discharge acute PT. Patient aware to alert MD/RN to decline in functional mobility to request PT re-evaluation.     Patient is safe to ambulate with RN supervision.       AM-PAC 6 CLICK MOBILITY  Total Score:24     Patient left sitting edge of bed with all lines intact and call button in reach.    GOALS:   Multidisciplinary Problems     Physical Therapy Goals     Not on file          Multidisciplinary Problems (Resolved)        Problem: Physical Therapy    Goal Priority Disciplines Outcome  Goal Variances Interventions   Physical Therapy Goal   (Resolved)     PT, PT/OT Met     Description: Patient is near his functional baseline. He is independent with gait 300', no AD used. He is safe to return home with no therapy needs. Discharge acute PT. Patient is aware to alert MD/RN to decline in mobility for PT reassessment. He is safe to ambulate with RN supervision.                    History:     Past Medical History:   Diagnosis Date    Arthritis     Cardiomyopathy     CHF (congestive heart failure) 10/01/2020    Dilated cardiomyopathy 10/26/2020    Drug abuse 10/2020    Renal disorder        Past Surgical History:   Procedure Laterality Date    CARDIAC DEFIBRILLATOR PLACEMENT      RIGHT HEART CATHETERIZATION Right 4/8/2022    Procedure: INSERTION, CATHETER, RIGHT HEART;  Surgeon: Luca Lopez Jr., MD;  Location: [facility] CATH LAB;  Service: Cardiology;  Laterality: Right;       Time Tracking:     PT Received On: 04/13/22  PT Start Time: 0839     PT Stop Time: 0846  PT Total Time (min): 7 min     Billable Minutes: Evaluation 7      04/13/2022

## 2022-04-13 NOTE — SUBJECTIVE & OBJECTIVE
Past Medical History:   Diagnosis Date    Arthritis     Cardiomyopathy     CHF (congestive heart failure) 10/01/2020    Dilated cardiomyopathy 10/26/2020    Drug abuse 10/2020    Renal disorder        Past Surgical History:   Procedure Laterality Date    CARDIAC DEFIBRILLATOR PLACEMENT      RIGHT HEART CATHETERIZATION Right 2022    Procedure: INSERTION, CATHETER, RIGHT HEART;  Surgeon: Luca Lopez Jr., MD;  Location: [facility] CATH LAB;  Service: Cardiology;  Laterality: Right;       Review of patient's allergies indicates:   Allergen Reactions    Bumex [bumetanide] Hives    Lactose Diarrhea    Torsemide Hives       Current Facility-Administered Medications   Medication    acetaminophen tablet 650 mg    aspirin chewable tablet 81 mg    busPIRone split tablet 7.5 mg    dextrose 10% bolus 125 mL    dextrose 10% bolus 250 mL    ferrous sulfate tablet 1 each    glucagon (human recombinant) injection 1 mg    glucose chewable tablet 16 g    glucose chewable tablet 24 g    heparin (porcine) injection 5,000 Units    insulin aspart U-100 pen 0-5 Units    milrinone 20mg in D5W 100 mL infusion    mirtazapine tablet 15 mg    mupirocin 2 % ointment    pantoprazole EC tablet 40 mg    sacubitriL-valsartan 24-26 mg per tablet 1 tablet    sodium chloride 0.9% flush 10 mL    spironolactone split tablet 12.5 mg    sucralfate tablet 1 g     Family History       Problem Relation (Age of Onset)    Diverticulosis Brother    Heart attack Maternal Grandmother, Maternal Grandfather    Heart failure Father          Tobacco Use    Smoking status: Former Smoker     Packs/day: 0.50     Years: 16.00     Pack years: 8.00     Start date: 10/1/2004     Quit date: 10/1/2020     Years since quittin.5    Smokeless tobacco: Never Used   Substance and Sexual Activity    Alcohol use: Not Currently    Drug use: Not Currently     Types: Marijuana, MDMA (Ecstacy)    Sexual activity: Yes     Partners: Female     Birth control/protection: None      Review of Systems   Constitutional:  Positive for fatigue.   HENT: Negative.     Eyes: Negative.    Respiratory:  Positive for shortness of breath.    Cardiovascular: Negative.    Gastrointestinal:  Positive for abdominal distention.   Endocrine: Negative.    Genitourinary: Negative.    Musculoskeletal: Negative.    Allergic/Immunologic: Negative.    Neurological: Negative.    Hematological: Negative.    Psychiatric/Behavioral: Negative.     Objective:     Vital Signs (Most Recent):  Temp: 97.7 °F (36.5 °C) (04/13/22 0752)  Pulse: 100 (04/13/22 0752)  Resp: 18 (04/13/22 0752)  BP: 109/65 (04/13/22 0752)  SpO2: 95 % (04/13/22 0752) Vital Signs (24h Range):  Temp:  [97 °F (36.1 °C)-98.1 °F (36.7 °C)] 97.7 °F (36.5 °C)  Pulse:  [] 100  Resp:  [16-18] 18  SpO2:  [93 %-99 %] 95 %  BP: ()/(48-75) 109/65     Patient Vitals for the past 72 hrs (Last 3 readings):   Weight   04/13/22 0800 97.5 kg (214 lb 15.2 oz)   04/12/22 1205 93.6 kg (206 lb 5.6 oz)     Body mass index is 26.87 kg/m².      Intake/Output Summary (Last 24 hours) at 4/13/2022 1110  Last data filed at 4/13/2022 0600  Gross per 24 hour   Intake 1832.75 ml   Output 2350 ml   Net -517.25 ml       Physical Exam  Constitutional:       Appearance: Normal appearance.   HENT:      Head: Normocephalic and atraumatic.   Eyes:      Conjunctiva/sclera: Conjunctivae normal.      Pupils: Pupils are equal, round, and reactive to light.   Neck:      Comments: I don't appreciate elevated neck veins sitting on edge of bed with legs dangling  Cardiovascular:      Rate and Rhythm: Normal rate and regular rhythm.      Comments: Grade II/VI systolic murmur apex, +S3  Pulmonary:      Effort: Pulmonary effort is normal.      Breath sounds: Normal breath sounds.   Abdominal:      General: Bowel sounds are normal.      Comments: RUQ fullness   Musculoskeletal:         General: No swelling. Normal range of motion.   Skin:     General: Skin is warm and dry.      Capillary  Refill: Capillary refill takes 2 to 3 seconds.   Neurological:      General: No focal deficit present.      Mental Status: He is alert and oriented to person, place, and time.   Psychiatric:         Mood and Affect: Mood normal.         Behavior: Behavior normal.         Thought Content: Thought content normal.         Judgment: Judgment normal.       Significant Labs:  CBC:  Recent Labs   Lab 04/08/22  1157 04/12/22  1304 04/13/22  0444   WBC 11.08 10.60 7.71   RBC 4.39* 4.29* 4.01*   HGB 13.6* 13.4* 12.4*   HCT 41.5 40.2 38.5*    241 239   MCV 95 94 96   MCH 31.0 31.2* 30.9   MCHC 32.8 33.3 32.2     BNP:  Recent Labs   Lab 04/12/22  1304   *     CMP:  Recent Labs   Lab 04/08/22  1157 04/12/22  1304 04/12/22  1754 04/13/22  0444   * 122* 210* 155*  155*   CALCIUM 10.3 10.1 8.6* 9.6  9.6   ALBUMIN 4.2 4.6  --  3.8   PROT 8.0 8.3  --  7.3    141 139 138  138   K 3.7 3.4* 3.7 3.6  3.6   CO2 26 21* 24 23  23    105 105 104  104   BUN 20 18 17 22*  22*   CREATININE 1.7* 1.5* 2.1* 1.7*  1.7*   ALKPHOS 82 86  --  80   ALT 24 31  --  28   AST 24 34  --  32   BILITOT 0.8 1.2*  --  0.8      Coagulation:   Recent Labs   Lab 04/12/22  1304   INR 1.1     LDH:  No results for input(s): LDH in the last 72 hours.  Microbiology:  Microbiology Results (last 7 days)       ** No results found for the last 168 hours. **            I have reviewed all pertinent labs within the past 24 hours.    Diagnostic Results:  I have reviewed all pertinent imaging results/findings within the past 24 hours.

## 2022-04-13 NOTE — CONSULTS
[facility] - Cardiology Stepdown  Cardiothoracic Surgery  Consult Note    Patient Name: Kevan Queen  MRN: [id number]  Admission Date: 4/12/2022  Attending Physician: Angela Yang DO  Referring Provider: Angela Yang DO    Patient information was obtained from patient, past medical records and ER records.     Inpatient consult to Cardiothoracic Surgery  Consult performed by: Vandana Coleman NP  Consult ordered by: Alana Cain NP  Reason for consult: advanced otpions        Subjective:     Principal Problem: Acute on chronic combined systolic and diastolic heart failure, NYHA class 4    History of Present Illness: Mr. Kevan Queen is a 37 year old male with a medical histroy significnat for NICM and former polysubstance abuse. He is followed by the heart failure team.  He arrived for a RHC and was admitted for evaluation of advanced options for heart failure.  CTS is being consulted for surgical portion of the advanced options evaluation.        No new subjective & objective note has been filed under this hospital service since the last note was generated.    Assessment/Plan:     NYHA Score: NYHA III: marked limitation of physical activity, comfortable at rest    * Acute on chronic combined systolic and diastolic heart failure, NYHA class 4  Mr. Kevan Queen is a 37 year old male with a medical histroy significnat for NICM and former polysubstance abuse. He is followed by the heart failure team.  He arrived for a RHC and was admitted for evaluation of advanced options for heart failure.  CTS is being consulted for surgical portion of the advanced options evaluation.  LVIDD 7, TASPE 1.38, blood group A+.  CT scan reviewed and does not preclude patient from advanced options.  Okay to continue eval for LVADA/OHT but given favorable blood group, would continue consider OHT.         Thank you for your consult. I will follow-up with patient. Please contact us if you have any additional  questions.    Vandana Coleman NP  Cardiothoracic Surgery  [facility] - Cardiology Stepdown    Patient seen and pertinent studies were reviewed.  Patient seems to be a candidate for advanced options.  Will recommend repeating 2D echo once optimal fluid status has been achieved to reassess RV function and LV size.  We will discuss candidacy at the multidisciplinary selection meeting.

## 2022-04-13 NOTE — PROGRESS NOTES
[facility] - Cardiology Stepdown  Heart Transplant  Progress Note    Patient Name: Kevan Queen  MRN: [id number]  Admission Date: 4/12/2022  Hospital Length of Stay: 1 days  Attending Physician: Angela Yang DO  Primary Care Provider: ORALIA Cline  Principal Problem:Acute on chronic combined systolic and diastolic heart failure, NYHA class 4    Subjective:     **Interval History: No complaints. IVP Lasix D/C'd last night 2/2 bump in sCr to 2,1 - back down to 1.7 this morning.    Continuous Infusions:   milrinone 20mg/100ml D5W (200mcg/ml) 0.375 mcg/kg/min (04/13/22 0238)     Scheduled Meds:   aspirin  81 mg Oral Daily    busPIRone  7.5 mg Oral Q12H    ferrous sulfate  1 tablet Oral Daily    heparin (porcine)  5,000 Units Subcutaneous Q8H    mirtazapine  15 mg Oral Nightly    mupirocin   Nasal BID    pantoprazole  40 mg Oral Daily    potassium chloride SA  40 mEq Oral Once    sacubitriL-valsartan  1 tablet Oral BID    spironolactone  12.5 mg Oral Daily    sucralfate  1 g Oral Nightly     PRN Meds:acetaminophen, dextrose 10%, dextrose 10%, glucagon (human recombinant), glucose, glucose, insulin aspart U-100, sodium chloride 0.9%    Review of patient's allergies indicates:   Allergen Reactions    Bumex [bumetanide] Hives    Lactose Diarrhea    Torsemide Hives     Objective:     Vital Signs (Most Recent):  Temp: 97.7 °F (36.5 °C) (04/13/22 0752)  Pulse: 100 (04/13/22 0752)  Resp: 18 (04/13/22 0752)  BP: 109/65 (04/13/22 0752)  SpO2: 95 % (04/13/22 0752) Vital Signs (24h Range):  Temp:  [97 °F (36.1 °C)-98.1 °F (36.7 °C)] 97.7 °F (36.5 °C)  Pulse:  [] 100  Resp:  [16-18] 18  SpO2:  [93 %-99 %] 95 %  BP: ()/(48-75) 109/65     Patient Vitals for the past 72 hrs (Last 3 readings):   Weight   04/13/22 0800 97.5 kg (214 lb 15.2 oz)   04/12/22 1205 93.6 kg (206 lb 5.6 oz)     Body mass index is 26.87 kg/m².      Intake/Output Summary (Last 24 hours) at 4/13/2022 0936  Last data filed at  4/13/2022 0600  Gross per 24 hour   Intake 1832.75 ml   Output 2350 ml   Net -517.25 ml       Hemodynamic Parameters:  CVP:  [10 mmHg] 10 mmHg    Telemetry: ST    Physical Exam  Constitutional:       Appearance: Normal appearance.   HENT:      Head: Normocephalic and atraumatic.   Eyes:      Conjunctiva/sclera: Conjunctivae normal.      Pupils: Pupils are equal, round, and reactive to light.   Neck:      Comments: I don't appreciate elevated neck veins sitting on edge of bed with legs dangling  Cardiovascular:      Rate and Rhythm: Normal rate and regular rhythm.      Comments: Grade II/VI systolic murmur apex, + S3  Pulmonary:      Effort: Pulmonary effort is normal.      Breath sounds: Normal breath sounds.   Abdominal:      General: Bowel sounds are normal.      Comments: RUQ fullness   Musculoskeletal:         General: No swelling. Normal range of motion.      Cervical back: Normal range of motion and neck supple.   Skin:     General: Skin is warm and dry.      Capillary Refill: Capillary refill takes 2 to 3 seconds.   Neurological:      General: No focal deficit present.      Mental Status: He is alert and oriented to person, place, and time.   Psychiatric:         Mood and Affect: Mood normal.         Behavior: Behavior normal.         Thought Content: Thought content normal.         Judgment: Judgment normal.       Significant Labs:  CBC:  Recent Labs   Lab 04/08/22  1157 04/12/22  1304 04/13/22  0444   WBC 11.08 10.60 7.71   RBC 4.39* 4.29* 4.01*   HGB 13.6* 13.4* 12.4*   HCT 41.5 40.2 38.5*    241 239   MCV 95 94 96   MCH 31.0 31.2* 30.9   MCHC 32.8 33.3 32.2     BNP:  Recent Labs   Lab 04/12/22  1304   *     CMP:  Recent Labs   Lab 04/08/22  1157 04/12/22  1304 04/12/22  1754 04/13/22  0444   * 122* 210* 155*  155*   CALCIUM 10.3 10.1 8.6* 9.6  9.6   ALBUMIN 4.2 4.6  --  3.8   PROT 8.0 8.3  --  7.3    141 139 138  138   K 3.7 3.4* 3.7 3.6  3.6   CO2 26 21* 24 23  23     105 105 104  104   BUN 20 18 17 22*  22*   CREATININE 1.7* 1.5* 2.1* 1.7*  1.7*   ALKPHOS 82 86  --  80   ALT 24 31  --  28   AST 24 34  --  32   BILITOT 0.8 1.2*  --  0.8      Coagulation:   Recent Labs   Lab 04/12/22  1304   INR 1.1     LDH:  No results for input(s): LDH in the last 72 hours.  Microbiology:  Microbiology Results (last 7 days)       ** No results found for the last 168 hours. **            I have reviewed all pertinent labs within the past 24 hours.    Estimated Creatinine Clearance: 71.1 mL/min (A) (based on SCr of 1.7 mg/dL (H)).    Diagnostic Results:  I have reviewed and interpreted all pertinent imaging results/findings within the past 24 hours.    Assessment and Plan:     36 yo BM with NICM, on home milrinone, hx of substance abuse who comes in for f/u. States that he has been doing well since last visit with Dr Lopez.Sleeps on 2 pillows. No PND anymore. Volume/weight has been stable. Great appetite. He did have to have his PICC line replaced as it got pulled out during sleep. He got his ICD placed last month in [place].      He claims being clean of any drugs, alcoho or tobacco for a long time. He came with his fiance that he has been living with for the last 8 years. They have 2 children in common. He has a total of 9 kids. Tox screen done 2/4/22 was -ve, but nicotine/cotinine screen was +ve same day. He admits to occasional cigarette smoking.      Underwent risk assessment with an echo and RHC on 4/8, both done on Milrinone 0.25 mcg/kg/min:    TTE: LVEDD 7, L:VEF 10%, grade 3 diastolic dysfunction, RV severely enlarge and mod to severely depressed, mod to severe MR, mild TR, PAS > 50 and IVC 8    RHC: RA 15, PAP 59/35 (43), W 33 and CO/CI 3.27/1.47.    Current GDMT: Toprol 25 mg qd, Entresto 24-26 bid, Aldactone 12.5 mg qd. Takes Lasix 80 mg bid plus Metolazone 2.5 mg qod    He presents as a direct admit with AHD and for advanced options consideration            * Acute on chronic  combined systolic and diastolic heart failure, NYHA class 4  NIDCM , on home Milrinone 0.25 mcg/kg/min, S/P ICD, h/o polysubstance abuse, tox screen 4/8 -ve, admitted with ADHF and for consideration for advanced options w/u:    -RHC on Milrinone 0.25 mcg on 4/8: RA 15, PA 59/35 (43), W 33 and CO/CI 3.27/1.47  -TTE on Milrinone 0.25 mcg on 4/8: LVEDD 7, LVEF 10%, grade 3 diastolic dysfunction, RV severely dilated, mod to severely depressed, mod-severe MR, mild TR, PAS > 50 and IVC 8  - Admit labs showed sCr 1.7 (baseline of ~ 1.6)  - Increased Milrinone to 0.375 mcg/kg/min on admit. Awaiting sVO2  - IVP Lasix D/C'd last night 2/2 bump in sCr to 2.1, now back down to 1.7  - Serial labs, keep K+ > 4.0 and Mg+ > 2.0  - GDMT; Hold Toprol given decompensation. Continue Entresto and Aldactone  - Step 1 of VAD only pathway started (still smokes)      Tobacco use  - Continues to smoke occasionally. WIll re check nicotine/cotinine        Alana Cain NP [id number]  Heart Transplant  [facility] - Cardiology Stepdown

## 2022-04-13 NOTE — PLAN OF CARE
Problem: Physical Therapy  Goal: Physical Therapy Goal  Description: Patient is near his functional baseline. He is independent with gait 300', no AD used. He is safe to return home with no therapy needs. Discharge acute PT. Patient is aware to alert MD/RN to decline in mobility for PT reassessment. He is safe to ambulate with RN supervision.   Outcome: Met   Digna Dyer, PT  4/13/2022

## 2022-04-13 NOTE — PROGRESS NOTES
Admit Note     Met with patient and boris Bundy to assess needs. Patient is a 37 y.o. single male, admitted for Heart failure, unspecified [I50.9]  Acute exacerbation of CHF (congestive heart failure) [I50.9].      Patient admitted from home on 4/12/2022 .  At this time, patient presents as alert and oriented x 4, pleasant, good eye contact, well groomed, recall good, concentration/judgement good, average intelligence, calm, communicative, cooperative and asking and answering questions appropriately.  At this time, patients caregiver presents as alert and oriented x 4, pleasant, good eye contact, well groomed, recall good, concentration/judgement good, average intelligence, calm, communicative, cooperative and asking and answering questions appropriately.     Household/Family Systems     Patient resides with patient's patient, significant other and child(jatin), age(s) 4 and 2, at [address] [place] [address].  Support system includes 7 children, mother and s/o.  Patient does have dependents that are in need of being cared for. Patient's children ages 4/2  are being cared for by by pt's mother [phone].     Patients primary caregiver is Niharika Wright, annemarie escalante, phone number [phone].  Confirmed patients contact information is [phone] (home);   Telephone Information:   Mobile [phone]   Mobile [phone]   .    During admission, patient's caregiver plans to stay in patient's room.  Confirmed patient and patients caregivers do have access to reliable transportation.    Cognitive Status/Learning     Patient reports reading ability as college and states patient does not have difficulty with reading, writing, seeing, hearing, comprehension, learning and memory.  Patient reports patient learns best by multisensory instruction .   Needed: No.   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  Working for Income: No  If no, reason not working: Disability, however, pt has been denied by [facility] twice.  strongly encouraged pt to apply for benefits again.   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     Adherence     Patient reports a high level of adherence to patients health care regimen.  Adherence counseling and education provided. Patient verbalizes understanding.    Substance Use    Patient reports the following substance usage.    Tobacco: none, patient denies any use.  Alcohol: none, patient denies any use.  Illicit Drugs/Non-prescribed Medications: pt has a history of marijuana,cocain and ecstasy drug use. Pt reports he has abstained from all drug use since 2021.  Patient states clear understanding of the potential impact of substance use.  Substance abstinence/cessation counseling, education and resources provided and reviewed.     Services Utilizing/ADLS    Infusion Service: Prior to admission, patient utilizing? no  Home Health: Prior to admission, patient utilizing? no  DME: Prior to admission, no  Pulmonary/Cardiac Rehab: Prior to admission, no  Dialysis:  Prior to admission, no  Transplant Specialty Pharmacy:  Prior to admission, no. Pt reports willingness to utilize [facility] post transplant if necessary.     Prior to admission, patient reports patient was independent with ADLS and was driving.  Patient reports patient is not able to care for self at this time due to compromised medical condition (as documented in medical record) and physical weakness..  Patient indicates a willingness to care for self once medically cleared to do so.    Insurance/Medications    Insured by   Payer/Plan Subscr  Sex Relation Sub. Ins. ID Effective Group Num   1. MEDICAID - HE* JOSE J DAMICO 1985 Male Self [id number] 3/1/20 [id number]                                   [address] [address]      Primary  "Insurance (for UNOS reporting): Public Insurance - Medicaid  Secondary Insurance (for UNOS reporting): None    Patient reports patient is able to obtain and afford medications at this time and at time of discharge.    Living Will/Healthcare Power of     Patient states patient does not have a LW and/or HCPA.   provided education regarding LW and HCPA and the completion of forms.    Coping/Mental Health    Patient is coping adequately with the aid of  family members and friends. SW inquired if pt has been able to follow through with previous mental health recommendation. Pt reports he established an appt with a psychiatrist at [facility] [place] [facility] but he requested appt be rescheduled. Pt reports he has not received any follow up. SW strongly encouraged pt to follow up and pt/fiance verbalized agreement.      However, pt did express having anger with medical condition and how it effects his family and distrust in current relationship. Pt reports fear of sleeping due to "I am just not sure I will wake up,it scares me. So Page stays awake with me. I just want to be around for my family.I know I am not depressed but I get sad sometimes and anxious because of the health stuff". SW provided acknowledgement, validation, normalization and inquired about symptoms of depression. Pt denied symptoms. Pt's simeone reports "I just want to be there for him but sometimes he won't let me. I know it's because of past issues but the family just wants to support him". Pt interrupted and explained the distrust in current relationship steams from infidelity in the relationship.      SW inquired if caregiver plan will be interrupted due to relationship issues. Pt and simeone reports recovery plan will not be disrupted due to current relationship issues. SW also confirmed pt's mother will assist with caregiver duties as well. SW provided extensive acknowledgement, active listening, validation, normalization, " support, encouragement, recommendations and STRONGLY recommended couples counseling.    Discharge Planning    At time of discharge, patient plans check into the [facility] apartments (unless pt receives an LVAD.SW will f/u with heart team) under the care of Niharika.  Patients significant other will transport patient.  Per rounds today, expected discharge date has not been medically determined at this time. Patient and patients caregiver  verbalize understanding and are involved in treatment planning and discharge process.    Additional Concerns    Patient's caretaker denies additional needs and/or concerns at this time.  providing ongoing psychosocial support, education, resources and d/c planning as needed.  SW remains available. Patient's caregiver verbalizes understanding and agreement with information reviewed,  availability and how to access available resources as needed. Patient verbalizes understanding and agreement with information reviewed, social work availability, and how to access available resources as needed.

## 2022-04-13 NOTE — PLAN OF CARE
Patient is AOX4 . Fall precautions in place, call light in reach , bed in lowest position . Patient remained free from fall /injuries/trauma overnight.   No chest pain, SOB  EARL . Milrinone gtt cont at 34.875 mcg/min . Plan of care reviewed with the patient . All questions addressed . No further concerns at this time.

## 2022-04-13 NOTE — SUBJECTIVE & OBJECTIVE
**Interval History: No complaints. IVP Lasix D/C'd last night 2/2 bump in sCr to 2,1 - back down to 1.7 this morning.    Continuous Infusions:   milrinone 20mg/100ml D5W (200mcg/ml) 0.375 mcg/kg/min (04/13/22 0238)     Scheduled Meds:   aspirin  81 mg Oral Daily    busPIRone  7.5 mg Oral Q12H    ferrous sulfate  1 tablet Oral Daily    heparin (porcine)  5,000 Units Subcutaneous Q8H    mirtazapine  15 mg Oral Nightly    mupirocin   Nasal BID    pantoprazole  40 mg Oral Daily    potassium chloride SA  40 mEq Oral Once    sacubitriL-valsartan  1 tablet Oral BID    spironolactone  12.5 mg Oral Daily    sucralfate  1 g Oral Nightly     PRN Meds:acetaminophen, dextrose 10%, dextrose 10%, glucagon (human recombinant), glucose, glucose, insulin aspart U-100, sodium chloride 0.9%    Review of patient's allergies indicates:   Allergen Reactions    Bumex [bumetanide] Hives    Lactose Diarrhea    Torsemide Hives     Objective:     Vital Signs (Most Recent):  Temp: 97.7 °F (36.5 °C) (04/13/22 0752)  Pulse: 100 (04/13/22 0752)  Resp: 18 (04/13/22 0752)  BP: 109/65 (04/13/22 0752)  SpO2: 95 % (04/13/22 0752) Vital Signs (24h Range):  Temp:  [97 °F (36.1 °C)-98.1 °F (36.7 °C)] 97.7 °F (36.5 °C)  Pulse:  [] 100  Resp:  [16-18] 18  SpO2:  [93 %-99 %] 95 %  BP: ()/(48-75) 109/65     Patient Vitals for the past 72 hrs (Last 3 readings):   Weight   04/13/22 0800 97.5 kg (214 lb 15.2 oz)   04/12/22 1205 93.6 kg (206 lb 5.6 oz)     Body mass index is 26.87 kg/m².      Intake/Output Summary (Last 24 hours) at 4/13/2022 0925  Last data filed at 4/13/2022 0600  Gross per 24 hour   Intake 1832.75 ml   Output 2350 ml   Net -517.25 ml       Hemodynamic Parameters:  CVP:  [10 mmHg] 10 mmHg    Telemetry: ST    Physical Exam  Constitutional:       Appearance: Normal appearance.   HENT:      Head: Normocephalic and atraumatic.   Eyes:      Conjunctiva/sclera: Conjunctivae normal.      Pupils: Pupils are equal, round, and reactive to  light.   Neck:      Comments: I don't appreciate elevated neck veins sitting on edge of bed with legs dangling  Cardiovascular:      Rate and Rhythm: Normal rate and regular rhythm.      Comments: Grade II/VI systolic murmur apex, + S3  Pulmonary:      Effort: Pulmonary effort is normal.      Breath sounds: Normal breath sounds.   Abdominal:      General: Bowel sounds are normal.      Comments: RUQ fullness   Musculoskeletal:         General: No swelling. Normal range of motion.      Cervical back: Normal range of motion and neck supple.   Skin:     General: Skin is warm and dry.      Capillary Refill: Capillary refill takes 2 to 3 seconds.   Neurological:      General: No focal deficit present.      Mental Status: He is alert and oriented to person, place, and time.   Psychiatric:         Mood and Affect: Mood normal.         Behavior: Behavior normal.         Thought Content: Thought content normal.         Judgment: Judgment normal.       Significant Labs:  CBC:  Recent Labs   Lab 04/08/22  1157 04/12/22  1304 04/13/22  0444   WBC 11.08 10.60 7.71   RBC 4.39* 4.29* 4.01*   HGB 13.6* 13.4* 12.4*   HCT 41.5 40.2 38.5*    241 239   MCV 95 94 96   MCH 31.0 31.2* 30.9   MCHC 32.8 33.3 32.2     BNP:  Recent Labs   Lab 04/12/22  1304   *     CMP:  Recent Labs   Lab 04/08/22  1157 04/12/22  1304 04/12/22  1754 04/13/22  0444   * 122* 210* 155*  155*   CALCIUM 10.3 10.1 8.6* 9.6  9.6   ALBUMIN 4.2 4.6  --  3.8   PROT 8.0 8.3  --  7.3    141 139 138  138   K 3.7 3.4* 3.7 3.6  3.6   CO2 26 21* 24 23  23    105 105 104  104   BUN 20 18 17 22*  22*   CREATININE 1.7* 1.5* 2.1* 1.7*  1.7*   ALKPHOS 82 86  --  80   ALT 24 31  --  28   AST 24 34  --  32   BILITOT 0.8 1.2*  --  0.8      Coagulation:   Recent Labs   Lab 04/12/22  1304   INR 1.1     LDH:  No results for input(s): LDH in the last 72 hours.  Microbiology:  Microbiology Results (last 7 days)       ** No results found for the  last 168 hours. **            I have reviewed all pertinent labs within the past 24 hours.    Estimated Creatinine Clearance: 71.1 mL/min (A) (based on SCr of 1.7 mg/dL (H)).    Diagnostic Results:  I have reviewed and interpreted all pertinent imaging results/findings within the past 24 hours.

## 2022-04-14 LAB
ALBUMIN SERPL BCP-MCNC: 4.3 G/DL (ref 3.5–5.2)
ALLENS TEST: ABNORMAL
ALLENS TEST: ABNORMAL
ALP SERPL-CCNC: 82 U/L (ref 55–135)
ALT SERPL W/O P-5'-P-CCNC: 31 U/L (ref 10–44)
ANION GAP SERPL CALC-SCNC: 12 MMOL/L (ref 8–16)
AST SERPL-CCNC: 30 U/L (ref 10–40)
BASOPHILS # BLD AUTO: 0.03 K/UL (ref 0–0.2)
BASOPHILS NFR BLD: 0.4 % (ref 0–1.9)
BILIRUB SERPL-MCNC: 1.2 MG/DL (ref 0.1–1)
BUN SERPL-MCNC: 18 MG/DL (ref 6–20)
BUN SERPL-MCNC: 19 MG/DL (ref 6–20)
BUN SERPL-MCNC: 19 MG/DL (ref 6–20)
CALCIUM SERPL-MCNC: 10.3 MG/DL (ref 8.7–10.5)
CALCIUM SERPL-MCNC: 10.3 MG/DL (ref 8.7–10.5)
CALCIUM SERPL-MCNC: 10.5 MG/DL (ref 8.7–10.5)
CHLORIDE SERPL-SCNC: 102 MMOL/L (ref 95–110)
CHLORIDE SERPL-SCNC: 102 MMOL/L (ref 95–110)
CHLORIDE SERPL-SCNC: 98 MMOL/L (ref 95–110)
CO2 SERPL-SCNC: 26 MMOL/L (ref 23–29)
CO2 SERPL-SCNC: 26 MMOL/L (ref 23–29)
CO2 SERPL-SCNC: 29 MMOL/L (ref 23–29)
COTININE SERPL-MCNC: 206 NG/ML
CREAT SERPL-MCNC: 1.4 MG/DL (ref 0.5–1.4)
CREAT SERPL-MCNC: 1.4 MG/DL (ref 0.5–1.4)
CREAT SERPL-MCNC: 1.5 MG/DL (ref 0.5–1.4)
DELSYS: ABNORMAL
DIFFERENTIAL METHOD: ABNORMAL
EOSINOPHIL # BLD AUTO: 0.1 K/UL (ref 0–0.5)
EOSINOPHIL NFR BLD: 1.4 % (ref 0–8)
ERYTHROCYTE [DISTWIDTH] IN BLOOD BY AUTOMATED COUNT: 16.5 % (ref 11.5–14.5)
EST. GFR  (AFRICAN AMERICAN): >60 ML/MIN/1.73 M^2
EST. GFR  (NON AFRICAN AMERICAN): 58.6 ML/MIN/1.73 M^2
EST. GFR  (NON AFRICAN AMERICAN): >60 ML/MIN/1.73 M^2
EST. GFR  (NON AFRICAN AMERICAN): >60 ML/MIN/1.73 M^2
GLUCOSE SERPL-MCNC: 117 MG/DL (ref 70–110)
GLUCOSE SERPL-MCNC: 117 MG/DL (ref 70–110)
GLUCOSE SERPL-MCNC: 185 MG/DL (ref 70–110)
HCO3 UR-SCNC: 23.2 MMOL/L (ref 24–28)
HCO3 UR-SCNC: 29.1 MMOL/L (ref 24–28)
HCT VFR BLD AUTO: 39.5 % (ref 40–54)
HGB BLD-MCNC: 12.5 G/DL (ref 14–18)
IMM GRANULOCYTES # BLD AUTO: 0.02 K/UL (ref 0–0.04)
IMM GRANULOCYTES NFR BLD AUTO: 0.2 % (ref 0–0.5)
LACTATE SERPL-SCNC: 1.4 MMOL/L (ref 0.5–2.2)
LYMPHOCYTES # BLD AUTO: 1.9 K/UL (ref 1–4.8)
LYMPHOCYTES NFR BLD: 22.6 % (ref 18–48)
MAGNESIUM SERPL-MCNC: 2 MG/DL (ref 1.6–2.6)
MAGNESIUM SERPL-MCNC: 2 MG/DL (ref 1.6–2.6)
MCH RBC QN AUTO: 31.2 PG (ref 27–31)
MCHC RBC AUTO-ENTMCNC: 31.6 G/DL (ref 32–36)
MCV RBC AUTO: 99 FL (ref 82–98)
MONOCYTES # BLD AUTO: 0.7 K/UL (ref 0.3–1)
MONOCYTES NFR BLD: 7.8 % (ref 4–15)
NEUTROPHILS # BLD AUTO: 5.7 K/UL (ref 1.8–7.7)
NEUTROPHILS NFR BLD: 67.6 % (ref 38–73)
NICOTINE SERPL-MCNC: 18 NG/ML
NRBC BLD-RTO: 0 /100 WBC
PCO2 BLDA: 44.9 MMHG (ref 35–45)
PCO2 BLDA: 47.9 MMHG (ref 35–45)
PH SMN: 7.32 [PH] (ref 7.35–7.45)
PH SMN: 7.39 [PH] (ref 7.35–7.45)
PLATELET # BLD AUTO: 235 K/UL (ref 150–450)
PMV BLD AUTO: 10.4 FL (ref 9.2–12.9)
PO2 BLDA: 29 MMHG (ref 40–60)
PO2 BLDA: 31 MMHG (ref 40–60)
POC BE: -3 MMOL/L
POC BE: 4 MMOL/L
POC SATURATED O2: 50 % (ref 95–100)
POC SATURATED O2: 57 % (ref 95–100)
POC SVO2: 57 %
POC TCO2: 25 MMOL/L (ref 24–29)
POC TCO2: 31 MMOL/L (ref 24–29)
POCT GLUCOSE: 123 MG/DL (ref 70–110)
POCT GLUCOSE: 161 MG/DL (ref 70–110)
POCT GLUCOSE: 168 MG/DL (ref 70–110)
POCT GLUCOSE: 186 MG/DL (ref 70–110)
POCT GLUCOSE: 196 MG/DL (ref 70–110)
POTASSIUM SERPL-SCNC: 3.8 MMOL/L (ref 3.5–5.1)
POTASSIUM SERPL-SCNC: 4 MMOL/L (ref 3.5–5.1)
POTASSIUM SERPL-SCNC: 4 MMOL/L (ref 3.5–5.1)
PROT SERPL-MCNC: 7.9 G/DL (ref 6–8.4)
RBC # BLD AUTO: 4.01 M/UL (ref 4.6–6.2)
SAMPLE: ABNORMAL
SAMPLE: ABNORMAL
SITE: ABNORMAL
SITE: ABNORMAL
SODIUM SERPL-SCNC: 139 MMOL/L (ref 136–145)
SODIUM SERPL-SCNC: 140 MMOL/L (ref 136–145)
SODIUM SERPL-SCNC: 140 MMOL/L (ref 136–145)
WBC # BLD AUTO: 8.49 K/UL (ref 3.9–12.7)

## 2022-04-14 PROCEDURE — 99233 SBSQ HOSP IP/OBS HIGH 50: CPT | Mod: NTX,,, | Performed by: NURSE PRACTITIONER

## 2022-04-14 PROCEDURE — 20600001 HC STEP DOWN PRIVATE ROOM: Mod: NTX

## 2022-04-14 PROCEDURE — 83735 ASSAY OF MAGNESIUM: CPT | Mod: 91,NTX | Performed by: NURSE PRACTITIONER

## 2022-04-14 PROCEDURE — 94761 N-INVAS EAR/PLS OXIMETRY MLT: CPT | Mod: NTX

## 2022-04-14 PROCEDURE — 25000003 PHARM REV CODE 250: Mod: NTX | Performed by: STUDENT IN AN ORGANIZED HEALTH CARE EDUCATION/TRAINING PROGRAM

## 2022-04-14 PROCEDURE — 80053 COMPREHEN METABOLIC PANEL: CPT | Mod: NTX | Performed by: NURSE PRACTITIONER

## 2022-04-14 PROCEDURE — 63600175 PHARM REV CODE 636 W HCPCS: Mod: NTX | Performed by: NURSE PRACTITIONER

## 2022-04-14 PROCEDURE — 63600175 PHARM REV CODE 636 W HCPCS: Mod: NTX | Performed by: STUDENT IN AN ORGANIZED HEALTH CARE EDUCATION/TRAINING PROGRAM

## 2022-04-14 PROCEDURE — 99900035 HC TECH TIME PER 15 MIN (STAT): Mod: NTX

## 2022-04-14 PROCEDURE — 99233 PR SUBSEQUENT HOSPITAL CARE,LEVL III: ICD-10-PCS | Mod: NTX,,, | Performed by: NURSE PRACTITIONER

## 2022-04-14 PROCEDURE — 85025 COMPLETE CBC W/AUTO DIFF WBC: CPT | Mod: NTX | Performed by: NURSE PRACTITIONER

## 2022-04-14 PROCEDURE — 25000003 PHARM REV CODE 250: Mod: NTX | Performed by: NURSE PRACTITIONER

## 2022-04-14 PROCEDURE — 80048 BASIC METABOLIC PNL TOTAL CA: CPT | Mod: NTX,XB | Performed by: NURSE PRACTITIONER

## 2022-04-14 PROCEDURE — 83605 ASSAY OF LACTIC ACID: CPT | Mod: NTX | Performed by: STUDENT IN AN ORGANIZED HEALTH CARE EDUCATION/TRAINING PROGRAM

## 2022-04-14 PROCEDURE — 82803 BLOOD GASES ANY COMBINATION: CPT | Mod: NTX

## 2022-04-14 RX ORDER — FUROSEMIDE 10 MG/ML
80 INJECTION INTRAMUSCULAR; INTRAVENOUS ONCE
Status: COMPLETED | OUTPATIENT
Start: 2022-04-14 | End: 2022-04-14

## 2022-04-14 RX ORDER — POTASSIUM CHLORIDE 20 MEQ/1
20 TABLET, EXTENDED RELEASE ORAL ONCE
Status: COMPLETED | OUTPATIENT
Start: 2022-04-14 | End: 2022-04-14

## 2022-04-14 RX ADMIN — POTASSIUM CHLORIDE 20 MEQ: 20 TABLET, EXTENDED RELEASE ORAL at 04:04

## 2022-04-14 RX ADMIN — MILRINONE LACTATE IN DEXTROSE 0.5 MCG/KG/MIN: 200 INJECTION, SOLUTION INTRAVENOUS at 02:04

## 2022-04-14 RX ADMIN — PANTOPRAZOLE SODIUM 40 MG: 40 TABLET, DELAYED RELEASE ORAL at 08:04

## 2022-04-14 RX ADMIN — BUSPIRONE HYDROCHLORIDE 7.5 MG: 5 TABLET ORAL at 08:04

## 2022-04-14 RX ADMIN — ASPIRIN 81 MG CHEWABLE TABLET 81 MG: 81 TABLET CHEWABLE at 08:04

## 2022-04-14 RX ADMIN — SUCRALFATE 1 G: 1 TABLET ORAL at 08:04

## 2022-04-14 RX ADMIN — FERROUS SULFATE TAB 325 MG (65 MG ELEMENTAL FE) 1 EACH: 325 (65 FE) TAB at 08:04

## 2022-04-14 RX ADMIN — FUROSEMIDE 20 MG/HR: 10 INJECTION, SOLUTION INTRAMUSCULAR; INTRAVENOUS at 02:04

## 2022-04-14 RX ADMIN — SACUBITRIL AND VALSARTAN 1 TABLET: 24; 26 TABLET, FILM COATED ORAL at 08:04

## 2022-04-14 RX ADMIN — HEPARIN SODIUM 5000 UNITS: 5000 INJECTION INTRAVENOUS; SUBCUTANEOUS at 05:04

## 2022-04-14 RX ADMIN — FUROSEMIDE 20 MG/HR: 10 INJECTION, SOLUTION INTRAMUSCULAR; INTRAVENOUS at 01:04

## 2022-04-14 RX ADMIN — ACETAMINOPHEN 650 MG: 325 TABLET ORAL at 08:04

## 2022-04-14 RX ADMIN — MIRTAZAPINE 15 MG: 15 TABLET, FILM COATED ORAL at 08:04

## 2022-04-14 RX ADMIN — FUROSEMIDE 80 MG: 10 INJECTION, SOLUTION INTRAMUSCULAR; INTRAVENOUS at 01:04

## 2022-04-14 RX ADMIN — HEPARIN SODIUM 5000 UNITS: 5000 INJECTION INTRAVENOUS; SUBCUTANEOUS at 01:04

## 2022-04-14 RX ADMIN — MILRINONE LACTATE IN DEXTROSE 0.5 MCG/KG/MIN: 200 INJECTION, SOLUTION INTRAVENOUS at 06:04

## 2022-04-14 RX ADMIN — MUPIROCIN: 20 OINTMENT TOPICAL at 08:04

## 2022-04-14 RX ADMIN — SPIRONOLACTONE 12.5 MG: 25 TABLET, FILM COATED ORAL at 08:04

## 2022-04-14 RX ADMIN — HEPARIN SODIUM 5000 UNITS: 5000 INJECTION INTRAVENOUS; SUBCUTANEOUS at 08:04

## 2022-04-14 RX ADMIN — MILRINONE LACTATE IN DEXTROSE 0.5 MCG/KG/MIN: 200 INJECTION, SOLUTION INTRAVENOUS at 09:04

## 2022-04-14 NOTE — OP NOTE
[facility] Lab Post Procedure Note    Patient tolerated the procedure well.   There were no complications.   Please see full report in EPIC for details.

## 2022-04-14 NOTE — DISCHARGE SUMMARY
[facility] - Short Stay Cardiac Unit  Discharge Note  Short Stay    Procedure(s) (LRB):  INSERTION, CATHETER, RIGHT HEART (Right)    OUTCOME: Patient tolerated treatment/procedure well without complication and is now ready for discharge.    DISPOSITION: Home or Self Care  He was unable to be admitted today but will make arrangements to admit next week for optimization and accelerated evaluation for transplant/LVAD.    FINAL DIAGNOSIS:  Chronic combined systolic and diastolic congestive heart failure    FOLLOWUP: In clinic    DISCHARGE INSTRUCTIONS:    AFTER THE PROCEDURE:   -DO NOT DRINK OR EAT ANYTHING UNTIL NO LONGER HOARSE   -You may remove the bandage in 24 hours and wash with soap and water.   -You may shower, but do not soak in a tub for three days.   PRECAUTIONS FOR THE NEXT 24 HOURS:   -If you need to cough, sneeze, have a bowel movement, or bear down, hold pressure over your bandage.   -Do not  anything heavier than a gallon of milk(about 5 pounds)   -Avoid excessive bending over.   SYMPTOMS TO WATCH FOR AND REPORT TO YOUR DOCTOR:   -BLEEDING: hold pressure over the site until bleeding stops. Proceed to Emergency Room by ambulance (do not drive yourself) if unable to stop bleeding. Notify your doctor.   -HEMATOMA(hard bruise under the skin): Krunal around the bruise if one develops. Call your doctor if it increases in size or if you have difficulty talking, swallowing, breathing or anything unusual.   SIGNS OF INFECTION:Fever (temperature over 100.5 F), pus or redness   -RASH   -CHEST PAIN OR SHORTNESS OF BREATH   You may call you coordinator in the Heart Failure/Heart Transplant/Pulmonary Hypertension Clinic at [phone] during normal business hours(Monday through Friday from 8 A.M. to 5 P.M.) After hours, call the Heart Transplant Service doctor on call at [phone].    TIME SPENT ON DISCHARGE: 10 minutes

## 2022-04-14 NOTE — PROGRESS NOTES
Interdisciplinary Rounds Report:   Attended interdisciplinary rounds with the [facility]/CTS services including the LVAD Coordinators, social workers, cardiologists, surgeons,  PT/OT/Speech, dietician, and unit charge nurses. Discussed patient status, plan of care, goals of care, including DC date, and post discharge needs. Plan of care will be discussed with the patient and/or family per the physician while rounding on the floor. This is a recurring meeting that is medically and socially necessary to collaborate with the interdisciplinary team to assist patient needs and safe discharge.

## 2022-04-14 NOTE — PROGRESS NOTES
At the request of Dr. Yang, I have been asked to meet patient and provide VAD education. Introduced self and reason for visit. Pt AAAO.  Provided phase 1 written VAD education. Included in Phase 1 folder is the following:     Evaluation Eval for [facility]  VAD support flyer  Maximus: Living a more active life  Pictures of examples of VADs     Explained the work up process including possible outcomes.     Explained to look over the entire contents and read Evaluation Eval for [facility] acknowledgement form.  Also explained that they should bring this folder with them to all clinic visits and if they are admitted to the hospital so that we can continue education as needed. Should there be any questions, please write them down and bring with you or feel free to call and we can talk on the phone. All questions answered to allie's satisfaction as evidence by verbal acknowledgement.

## 2022-04-14 NOTE — PLAN OF CARE
Brief Interval Note    Hemodynamics:  CVP 10 --> 14   SVO2 63 --> 50  /75 MAP 83    UOP over past 8 hours is 225ml and patient is net positive +1.2L over past 24 hours      Plan:  - will give lasix bolus 80mg IVP and start lasix drip at rate of 20mg/hr  - will increase milrinone to 0.5mcg/kg/min      Bonilla Mohan MD  HTS  SpectraLink: [id number]    Plan discussed with HTS Attending

## 2022-04-14 NOTE — PLAN OF CARE
Plan of care discussed with patient.  Patient remains free of falls and trauma. Patient ambulating independently in hallway. Milrinone drip and Lasix drip infusing. Educated pt on importance of strict intake/output monitoring. CVP 8 this AM and SVO2 57. [facility] provided patient with education on LVAD. Patient has no questions at this time.

## 2022-04-14 NOTE — PLAN OF CARE
Patient is AOX4 . Fall precautions in place, call light in reach , bed in lowest position . Patient remained free from fall /injuries/trauma overnight. NSR /ST on telemetry  . V/S Within normal limits . Milrinone gtt cont at 0.5 mcg/kg/min and lasix gtt cont at 20mg/hr . No chest pain, SOB  EARL .Plan of care reviewed with the patient . All questions addressed . No further concerns at this time.

## 2022-04-14 NOTE — PROGRESS NOTES
[facility] - Cardiology Stepdown  Heart Transplant  Progress Note    Patient Name: Kevan Queen  MRN: [id number]  Admission Date: 4/12/2022  Hospital Length of Stay: 2 days  Attending Physician: Angela Yang DO  Primary Care Provider: ORALIA Cline  Principal Problem:Acute on chronic combined systolic and diastolic heart failure, NYHA class 4    Subjective:     **Interval History: No complaints this morning. Overnight Milrinone was increased to 0.5 mcg/kg/min and Lasix resumed at 20 mg/hr 2/2 drop in sVO2, increase in CNP and drop in UOP. He is now net -ve 2.4L, CVP down to 8 and sVO2 back up to 57 with CO/CI 5.05/2.23 and SVR 1124. sCr has trended down to 1.4. Tox screen remains -ve. On step 2 of VAD only w/u despite favorable blood type (A) 2/2 tobacco use    Continuous Infusions:   furosemide (LASIX) 10 mg/mL infusion (non-titrating) 20 mg/hr (04/14/22 0144)    milrinone 20mg/100ml D5W (200mcg/ml) 0.5 mcg/kg/min (04/14/22 0644)     Scheduled Meds:   aspirin  81 mg Oral Daily    busPIRone  7.5 mg Oral Q12H    ferrous sulfate  1 tablet Oral Daily    heparin (porcine)  5,000 Units Subcutaneous Q8H    mirtazapine  15 mg Oral Nightly    mupirocin   Nasal BID    pantoprazole  40 mg Oral Daily    sacubitriL-valsartan  1 tablet Oral BID    spironolactone  12.5 mg Oral Daily    sucralfate  1 g Oral Nightly     PRN Meds:acetaminophen, dextrose 10%, dextrose 10%, glucagon (human recombinant), glucose, glucose, insulin aspart U-100, sodium chloride 0.9%    Review of patient's allergies indicates:   Allergen Reactions    Bumex [bumetanide] Hives    Lactose Diarrhea    Torsemide Hives     Objective:     Vital Signs (Most Recent):  Temp: 98.3 °F (36.8 °C) (04/14/22 1139)  Pulse: (!) 113 (04/14/22 1200)  Resp: 18 (04/14/22 1139)  BP: (!) 100/55 (04/14/22 1139)  SpO2: 95 % (04/14/22 1139)   Vital Signs (24h Range):  Temp:  [98 °F (36.7 °C)-98.3 °F (36.8 °C)] 98.3 °F (36.8 °C)  Pulse:  [] 113  Resp:   [18-20] 18  SpO2:  [92 %-99 %] 95 %  BP: ()/(55-92) 100/55     Patient Vitals for the past 72 hrs (Last 3 readings):   Weight   04/14/22 0822 92.4 kg (203 lb 11.3 oz)   04/13/22 0800 97.5 kg (214 lb 15.2 oz)   04/12/22 1205 93.6 kg (206 lb 5.6 oz)     Body mass index is 25.46 kg/m².      Intake/Output Summary (Last 24 hours) at 4/14/2022 1246  Last data filed at 4/14/2022 1200  Gross per 24 hour   Intake 2684.16 ml   Output 7055 ml   Net -4370.84 ml       Hemodynamic Parameters:  CVP:  [8 mmHg-14 mmHg] 8 mmHg    Telemetry: ST    Physical Exam  Constitutional:       Appearance: Normal appearance.   HENT:      Head: Normocephalic and atraumatic.   Eyes:      Conjunctiva/sclera: Conjunctivae normal.      Pupils: Pupils are equal, round, and reactive to light.   Neck:      Comments: Neck veins are not elevated  Cardiovascular:      Rate and Rhythm: Normal rate and regular rhythm.      Comments: Grade II/VI systolic murmur apex, +S3  Pulmonary:      Effort: Pulmonary effort is normal.      Breath sounds: Normal breath sounds.   Abdominal:      General: Bowel sounds are normal.      Palpations: Abdomen is soft.   Musculoskeletal:         General: No swelling. Normal range of motion.      Cervical back: Normal range of motion and neck supple.   Skin:     General: Skin is warm and dry.      Capillary Refill: Capillary refill takes 2 to 3 seconds.   Neurological:      General: No focal deficit present.      Mental Status: He is alert and oriented to person, place, and time.   Psychiatric:         Mood and Affect: Mood normal.         Behavior: Behavior normal.         Thought Content: Thought content normal.         Judgment: Judgment normal.       Significant Labs:  CBC:  Recent Labs   Lab 04/12/22  1304 04/13/22  0444 04/14/22  0420   WBC 10.60 7.71 8.49   RBC 4.29* 4.01* 4.01*   HGB 13.4* 12.4* 12.5*   HCT 40.2 38.5* 39.5*    239 235   MCV 94 96 99*   MCH 31.2* 30.9 31.2*   MCHC 33.3 32.2 31.6*      BNP:  Recent Labs   Lab 04/12/22  1304   *     CMP:  Recent Labs   Lab 04/12/22  1304 04/12/22  1754 04/13/22  0444 04/13/22  1604 04/14/22  0420   *   < > 155*  155* 225* 117*  117*   CALCIUM 10.1   < > 9.6  9.6 9.5 10.3  10.3   ALBUMIN 4.6  --  3.8  --  4.3   PROT 8.3  --  7.3  --  7.9      < > 138  138 138 140  140   K 3.4*   < > 3.6  3.6 4.0 4.0  4.0   CO2 21*   < > 23  23 24 26  26      < > 104  104 103 102  102   BUN 18   < > 22*  22* 20 19  19   CREATININE 1.5*   < > 1.7*  1.7* 1.6* 1.4  1.4   ALKPHOS 86  --  80  --  82   ALT 31  --  28  --  31   AST 34  --  32  --  30   BILITOT 1.2*  --  0.8  --  1.2*    < > = values in this interval not displayed.      Coagulation:   Recent Labs   Lab 04/12/22  1304   INR 1.1     LDH:  No results for input(s): LDH in the last 72 hours.  Microbiology:  Microbiology Results (last 7 days)       ** No results found for the last 168 hours. **            I have reviewed all pertinent labs within the past 24 hours.    Estimated Creatinine Clearance: 86.3 mL/min (based on SCr of 1.4 mg/dL).    Diagnostic Results:  I have reviewed and interpreted all pertinent imaging results/findings within the past 24 hours.    Assessment and Plan:     36 yo BM with NICM, on home milrinone, hx of substance abuse who comes in for f/u. States that he has been doing well since last visit with Dr Lopez.Sleeps on 2 pillows. No PND anymore. Volume/weight has been stable. Great appetite. He did have to have his PICC line replaced as it got pulled out during sleep. He got his ICD placed last month in [place].      He claims being clean of any drugs, alcoho or tobacco for a long time. He came with his fiance that he has been living with for the last 8 years. They have 2 children in common. He has a total of 9 kids. Tox screen done 2/4/22 was -ve, but nicotine/cotinine screen was +ve same day. He admits to occasional cigarette smoking.      Underwent risk  assessment with an echo and RHC on 4/8, both done on Milrinone 0.25 mcg/kg/min:    TTE: LVEDD 7, L:VEF 10%, grade 3 diastolic dysfunction, RV severely enlarge and mod to severely depressed, mod to severe MR, mild TR, PAS > 50 and IVC 8    RHC: RA 15, PAP 59/35 (43), W 33 and CO/CI 3.27/1.47.    Current GDMT: Toprol 25 mg qd, Entresto 24-26 bid, Aldactone 12.5 mg qd. Takes Lasix 80 mg bid plus Metolazone 2.5 mg qod    He presents as a direct admit with AHD and for advanced options consideration            * Acute on chronic combined systolic and diastolic heart failure, NYHA class 4  NIDCM , on home Milrinone 0.25 mcg/kg/min, S/P ICD, h/o polysubstance abuse, tox screen 4/8 -ve, admitted with ADHF and for consideration for advanced options w/u:    -RHC on Milrinone 0.25 mcg on 4/8: RA 15, PA 59/35 (43), W 33 and CO/CI 3.27/1.47  -TTE on Milrinone 0.25 mcg on 4/8: LVEDD 7, LVEF 10%, grade 3 diastolic dysfunction, RV severely dilated, mod to severely depressed, mod-severe MR, mild TR, PAS > 50 and IVC 8  - Admit labs showed sCr 1.7 (baseline of ~ 1.6)  - Increased Milrinone to 0.375 mcg/kg/min on admit. Milrinone then increased to 0.5 mcg/kg/min overnight 2/2 drop in sVO2. sVO2 has since improved to 57 with CO/CI 5.05/2.23 and SVR 1124  - Lasix resumed at 20 mg/hr overnight with decrease in CVP to 8 and increase in UOP. Net -ve 2.4L past 24 hours and sCr has trended down to 1.4  - Serial labs, keep K+ > 4.0 and Mg+ > 2.0  - GDMT; Hold Toprol given decompensation. Continue Entresto and Aldactone  - Step 2 of VAD only pathway despite favorable blood type (A) (still smokes)      Tobacco use  - Continues to smoke occasionally. WIll re check nicotine/[place] [id number]  Heart Transplant  [facility] - Cardiology Stepdown

## 2022-04-14 NOTE — SUBJECTIVE & OBJECTIVE
**Interval History: No complaints this morning. Overnight Milrinone was increased to 0.5 mcg/kg/min and Lasix resumed at 20 mg/hr 2/2 drop in sVO2, increase in CNP and drop in UOP. He is now net -ve 2.4L, CVP down to 8 and sVO2 back up to 57 with CO/CI 5.05/2.23 and SVR 1124. sCr has trended down to 1.4. Tox screen remains -ve. On step 2 of VAD only w/u despite favorable blood type (A) 2/2 tobacco use    Continuous Infusions:   furosemide (LASIX) 10 mg/mL infusion (non-titrating) 20 mg/hr (04/14/22 0144)    milrinone 20mg/100ml D5W (200mcg/ml) 0.5 mcg/kg/min (04/14/22 0644)     Scheduled Meds:   aspirin  81 mg Oral Daily    busPIRone  7.5 mg Oral Q12H    ferrous sulfate  1 tablet Oral Daily    heparin (porcine)  5,000 Units Subcutaneous Q8H    mirtazapine  15 mg Oral Nightly    mupirocin   Nasal BID    pantoprazole  40 mg Oral Daily    sacubitriL-valsartan  1 tablet Oral BID    spironolactone  12.5 mg Oral Daily    sucralfate  1 g Oral Nightly     PRN Meds:acetaminophen, dextrose 10%, dextrose 10%, glucagon (human recombinant), glucose, glucose, insulin aspart U-100, sodium chloride 0.9%    Review of patient's allergies indicates:   Allergen Reactions    Bumex [bumetanide] Hives    Lactose Diarrhea    Torsemide Hives     Objective:     Vital Signs (Most Recent):  Temp: 98.3 °F (36.8 °C) (04/14/22 1139)  Pulse: (!) 113 (04/14/22 1200)  Resp: 18 (04/14/22 1139)  BP: (!) 100/55 (04/14/22 1139)  SpO2: 95 % (04/14/22 1139)   Vital Signs (24h Range):  Temp:  [98 °F (36.7 °C)-98.3 °F (36.8 °C)] 98.3 °F (36.8 °C)  Pulse:  [] 113  Resp:  [18-20] 18  SpO2:  [92 %-99 %] 95 %  BP: ()/(55-92) 100/55     Patient Vitals for the past 72 hrs (Last 3 readings):   Weight   04/14/22 0822 92.4 kg (203 lb 11.3 oz)   04/13/22 0800 97.5 kg (214 lb 15.2 oz)   04/12/22 1205 93.6 kg (206 lb 5.6 oz)     Body mass index is 25.46 kg/m².      Intake/Output Summary (Last 24 hours) at 4/14/2022 1246  Last data filed at 4/14/2022  1200  Gross per 24 hour   Intake 2684.16 ml   Output 7055 ml   Net -4370.84 ml       Hemodynamic Parameters:  CVP:  [8 mmHg-14 mmHg] 8 mmHg    Telemetry: ST    Physical Exam  Constitutional:       Appearance: Normal appearance.   HENT:      Head: Normocephalic and atraumatic.   Eyes:      Conjunctiva/sclera: Conjunctivae normal.      Pupils: Pupils are equal, round, and reactive to light.   Neck:      Comments: Neck veins are not elevated  Cardiovascular:      Rate and Rhythm: Normal rate and regular rhythm.      Comments: Grade II/VI systolic murmur apex, +S3  Pulmonary:      Effort: Pulmonary effort is normal.      Breath sounds: Normal breath sounds.   Abdominal:      General: Bowel sounds are normal.      Palpations: Abdomen is soft.   Musculoskeletal:         General: No swelling. Normal range of motion.      Cervical back: Normal range of motion and neck supple.   Skin:     General: Skin is warm and dry.      Capillary Refill: Capillary refill takes 2 to 3 seconds.   Neurological:      General: No focal deficit present.      Mental Status: He is alert and oriented to person, place, and time.   Psychiatric:         Mood and Affect: Mood normal.         Behavior: Behavior normal.         Thought Content: Thought content normal.         Judgment: Judgment normal.       Significant Labs:  CBC:  Recent Labs   Lab 04/12/22  1304 04/13/22  0444 04/14/22  0420   WBC 10.60 7.71 8.49   RBC 4.29* 4.01* 4.01*   HGB 13.4* 12.4* 12.5*   HCT 40.2 38.5* 39.5*    239 235   MCV 94 96 99*   MCH 31.2* 30.9 31.2*   MCHC 33.3 32.2 31.6*     BNP:  Recent Labs   Lab 04/12/22  1304   *     CMP:  Recent Labs   Lab 04/12/22  1304 04/12/22  1754 04/13/22  0444 04/13/22  1604 04/14/22  0420   *   < > 155*  155* 225* 117*  117*   CALCIUM 10.1   < > 9.6  9.6 9.5 10.3  10.3   ALBUMIN 4.6  --  3.8  --  4.3   PROT 8.3  --  7.3  --  7.9      < > 138  138 138 140  140   K 3.4*   < > 3.6  3.6 4.0 4.0  4.0   CO2  21*   < > 23  23 24 26  26      < > 104  104 103 102  102   BUN 18   < > 22*  22* 20 19  19   CREATININE 1.5*   < > 1.7*  1.7* 1.6* 1.4  1.4   ALKPHOS 86  --  80  --  82   ALT 31  --  28  --  31   AST 34  --  32  --  30   BILITOT 1.2*  --  0.8  --  1.2*    < > = values in this interval not displayed.      Coagulation:   Recent Labs   Lab 04/12/22  1304   INR 1.1     LDH:  No results for input(s): LDH in the last 72 hours.  Microbiology:  Microbiology Results (last 7 days)       ** No results found for the last 168 hours. **            I have reviewed all pertinent labs within the past 24 hours.    Estimated Creatinine Clearance: 86.3 mL/min (based on SCr of 1.4 mg/dL).    Diagnostic Results:  I have reviewed and interpreted all pertinent imaging results/findings within the past 24 hours.

## 2022-04-14 NOTE — PROGRESS NOTES
Patient output 200 cc throughout the day , also has mild SOB . Notified MD Mohan , ordered bladder scan  . Bladder scan was 0-39 cc . Also MD ordered to continue to monitor for now as team wants to give a break on lasix for  today . Might start him on lasix on 4/14 .    Will cont to monitor .

## 2022-04-14 NOTE — NURSING
Increased milrinone to 0.5 mcg/kg/min, lasix 80 mg bolus given and started on lasix cont infusion at 20 mg/hr . Will cont to monitor .

## 2022-04-15 LAB
ALBUMIN SERPL BCP-MCNC: 4.3 G/DL (ref 3.5–5.2)
ALLENS TEST: ABNORMAL
ALLENS TEST: ABNORMAL
ALP SERPL-CCNC: 90 U/L (ref 55–135)
ALT SERPL W/O P-5'-P-CCNC: 28 U/L (ref 10–44)
ANION GAP SERPL CALC-SCNC: 14 MMOL/L (ref 8–16)
ANION GAP SERPL CALC-SCNC: 14 MMOL/L (ref 8–16)
AST SERPL-CCNC: 24 U/L (ref 10–40)
BASOPHILS # BLD AUTO: 0.04 K/UL (ref 0–0.2)
BASOPHILS NFR BLD: 0.5 % (ref 0–1.9)
BILIRUB SERPL-MCNC: 1.4 MG/DL (ref 0.1–1)
BUN SERPL-MCNC: 24 MG/DL (ref 6–20)
BUN SERPL-MCNC: 24 MG/DL (ref 6–20)
CALCIUM SERPL-MCNC: 10.6 MG/DL (ref 8.7–10.5)
CALCIUM SERPL-MCNC: 10.6 MG/DL (ref 8.7–10.5)
CHLORIDE SERPL-SCNC: 96 MMOL/L (ref 95–110)
CHLORIDE SERPL-SCNC: 96 MMOL/L (ref 95–110)
CO2 SERPL-SCNC: 27 MMOL/L (ref 23–29)
CO2 SERPL-SCNC: 27 MMOL/L (ref 23–29)
CREAT SERPL-MCNC: 1.5 MG/DL (ref 0.5–1.4)
CREAT SERPL-MCNC: 1.5 MG/DL (ref 0.5–1.4)
DELSYS: ABNORMAL
DIFFERENTIAL METHOD: ABNORMAL
EOSINOPHIL # BLD AUTO: 0.2 K/UL (ref 0–0.5)
EOSINOPHIL NFR BLD: 2.1 % (ref 0–8)
ERYTHROCYTE [DISTWIDTH] IN BLOOD BY AUTOMATED COUNT: 15.4 % (ref 11.5–14.5)
EST. GFR  (AFRICAN AMERICAN): >60 ML/MIN/1.73 M^2
EST. GFR  (AFRICAN AMERICAN): >60 ML/MIN/1.73 M^2
EST. GFR  (NON AFRICAN AMERICAN): 58.6 ML/MIN/1.73 M^2
EST. GFR  (NON AFRICAN AMERICAN): 58.6 ML/MIN/1.73 M^2
GLUCOSE SERPL-MCNC: 171 MG/DL (ref 70–110)
GLUCOSE SERPL-MCNC: 171 MG/DL (ref 70–110)
HCO3 UR-SCNC: 30.5 MMOL/L (ref 24–28)
HCO3 UR-SCNC: 31.3 MMOL/L (ref 24–28)
HCT VFR BLD AUTO: 44.9 % (ref 40–54)
HGB BLD-MCNC: 14.8 G/DL (ref 14–18)
IMM GRANULOCYTES # BLD AUTO: 0.01 K/UL (ref 0–0.04)
IMM GRANULOCYTES NFR BLD AUTO: 0.1 % (ref 0–0.5)
LYMPHOCYTES # BLD AUTO: 1.6 K/UL (ref 1–4.8)
LYMPHOCYTES NFR BLD: 21.8 % (ref 18–48)
MAGNESIUM SERPL-MCNC: 1.9 MG/DL (ref 1.6–2.6)
MCH RBC QN AUTO: 31.1 PG (ref 27–31)
MCHC RBC AUTO-ENTMCNC: 33 G/DL (ref 32–36)
MCV RBC AUTO: 94 FL (ref 82–98)
MONOCYTES # BLD AUTO: 0.7 K/UL (ref 0.3–1)
MONOCYTES NFR BLD: 9.2 % (ref 4–15)
NEUTROPHILS # BLD AUTO: 4.8 K/UL (ref 1.8–7.7)
NEUTROPHILS NFR BLD: 66.3 % (ref 38–73)
NRBC BLD-RTO: 0 /100 WBC
PCO2 BLDA: 52.5 MMHG (ref 35–45)
PCO2 BLDA: 54.9 MMHG (ref 35–45)
PH SMN: 7.36 [PH] (ref 7.35–7.45)
PH SMN: 7.37 [PH] (ref 7.35–7.45)
PLATELET # BLD AUTO: 247 K/UL (ref 150–450)
PMV BLD AUTO: 9.5 FL (ref 9.2–12.9)
PO2 BLDA: 30 MMHG (ref 40–60)
PO2 BLDA: 34 MMHG (ref 40–60)
POC BE: 5 MMOL/L
POC BE: 6 MMOL/L
POC SATURATED O2: 53 % (ref 95–100)
POC SATURATED O2: 61 % (ref 95–100)
POC TCO2: 32 MMOL/L (ref 24–29)
POC TCO2: 33 MMOL/L (ref 24–29)
POCT GLUCOSE: 141 MG/DL (ref 70–110)
POCT GLUCOSE: 161 MG/DL (ref 70–110)
POCT GLUCOSE: 182 MG/DL (ref 70–110)
POCT GLUCOSE: 210 MG/DL (ref 70–110)
POTASSIUM SERPL-SCNC: 3.6 MMOL/L (ref 3.5–5.1)
POTASSIUM SERPL-SCNC: 3.6 MMOL/L (ref 3.5–5.1)
PROT SERPL-MCNC: 8.3 G/DL (ref 6–8.4)
RBC # BLD AUTO: 4.76 M/UL (ref 4.6–6.2)
SAMPLE: ABNORMAL
SAMPLE: ABNORMAL
SITE: ABNORMAL
SITE: ABNORMAL
SODIUM SERPL-SCNC: 137 MMOL/L (ref 136–145)
SODIUM SERPL-SCNC: 137 MMOL/L (ref 136–145)
WBC # BLD AUTO: 7.3 K/UL (ref 3.9–12.7)

## 2022-04-15 PROCEDURE — 63600175 PHARM REV CODE 636 W HCPCS: Mod: NTX | Performed by: NURSE PRACTITIONER

## 2022-04-15 PROCEDURE — 99900035 HC TECH TIME PER 15 MIN (STAT): Mod: NTX

## 2022-04-15 PROCEDURE — 80053 COMPREHEN METABOLIC PANEL: CPT | Mod: NTX | Performed by: NURSE PRACTITIONER

## 2022-04-15 PROCEDURE — 83735 ASSAY OF MAGNESIUM: CPT | Mod: NTX | Performed by: NURSE PRACTITIONER

## 2022-04-15 PROCEDURE — 25000003 PHARM REV CODE 250: Mod: NTX | Performed by: NURSE PRACTITIONER

## 2022-04-15 PROCEDURE — 85025 COMPLETE CBC W/AUTO DIFF WBC: CPT | Mod: NTX | Performed by: NURSE PRACTITIONER

## 2022-04-15 PROCEDURE — 63600175 PHARM REV CODE 636 W HCPCS: Mod: NTX | Performed by: STUDENT IN AN ORGANIZED HEALTH CARE EDUCATION/TRAINING PROGRAM

## 2022-04-15 PROCEDURE — 94761 N-INVAS EAR/PLS OXIMETRY MLT: CPT | Mod: NTX

## 2022-04-15 PROCEDURE — 82803 BLOOD GASES ANY COMBINATION: CPT | Mod: NTX

## 2022-04-15 PROCEDURE — 20600001 HC STEP DOWN PRIVATE ROOM: Mod: NTX

## 2022-04-15 RX ADMIN — INSULIN ASPART 1 UNITS: 100 INJECTION, SOLUTION INTRAVENOUS; SUBCUTANEOUS at 08:04

## 2022-04-15 RX ADMIN — BUSPIRONE HYDROCHLORIDE 7.5 MG: 5 TABLET ORAL at 08:04

## 2022-04-15 RX ADMIN — MILRINONE LACTATE IN DEXTROSE 0.5 MCG/KG/MIN: 200 INJECTION, SOLUTION INTRAVENOUS at 02:04

## 2022-04-15 RX ADMIN — FUROSEMIDE 20 MG/HR: 10 INJECTION, SOLUTION INTRAMUSCULAR; INTRAVENOUS at 09:04

## 2022-04-15 RX ADMIN — SPIRONOLACTONE 12.5 MG: 25 TABLET, FILM COATED ORAL at 08:04

## 2022-04-15 RX ADMIN — SACUBITRIL AND VALSARTAN 1 TABLET: 24; 26 TABLET, FILM COATED ORAL at 08:04

## 2022-04-15 RX ADMIN — SUCRALFATE 1 G: 1 TABLET ORAL at 08:04

## 2022-04-15 RX ADMIN — HEPARIN SODIUM 5000 UNITS: 5000 INJECTION INTRAVENOUS; SUBCUTANEOUS at 05:04

## 2022-04-15 RX ADMIN — MILRINONE LACTATE IN DEXTROSE 0.5 MCG/KG/MIN: 200 INJECTION, SOLUTION INTRAVENOUS at 10:04

## 2022-04-15 RX ADMIN — MILRINONE LACTATE IN DEXTROSE 0.5 MCG/KG/MIN: 200 INJECTION, SOLUTION INTRAVENOUS at 05:04

## 2022-04-15 RX ADMIN — MIRTAZAPINE 15 MG: 15 TABLET, FILM COATED ORAL at 08:04

## 2022-04-15 RX ADMIN — FERROUS SULFATE TAB 325 MG (65 MG ELEMENTAL FE) 1 EACH: 325 (65 FE) TAB at 08:04

## 2022-04-15 RX ADMIN — MUPIROCIN: 20 OINTMENT TOPICAL at 10:04

## 2022-04-15 RX ADMIN — ASPIRIN 81 MG CHEWABLE TABLET 81 MG: 81 TABLET CHEWABLE at 08:04

## 2022-04-15 RX ADMIN — PANTOPRAZOLE SODIUM 40 MG: 40 TABLET, DELAYED RELEASE ORAL at 08:04

## 2022-04-15 RX ADMIN — HEPARIN SODIUM 5000 UNITS: 5000 INJECTION INTRAVENOUS; SUBCUTANEOUS at 01:04

## 2022-04-15 NOTE — PROGRESS NOTES
04/15/22 0545   Invasive Hemodynamic Monitoring   CVP (mmHg) 5 mmHg          Latest Reference Range & Units 04/15/22 05:09   POC SATURATED O2 95 - 100 % 53 (L)   (L): Data is abnormally low

## 2022-04-15 NOTE — PROGRESS NOTES
04/15/22 1645   Invasive Hemodynamic Monitoring   Hemodynamic Monitoring Additional Assessment No   CVP (mmHg) 6 mmHg   SVO2 (%) 61 %

## 2022-04-15 NOTE — NURSING
"Plan of care reviewed with patient and family at bedside, Pt alert x4, ambulates independently. Milrinone gtt infusing at 14ml/hr, along with  Lasix 2ml/hr. Strict I /O's .vitally stable.     /65 (BP Location: Left arm, Patient Position: Sitting)   Pulse (!) 117   Temp 97.4 °F (36.3 °C) (Oral)   Resp 18   Ht 6' 3" (1.905 m)   Wt 92.4 kg (203 lb 11.3 oz)   SpO2 (!) 94%   BMI 25.46 kg/m²     Will continue to monitor.    "

## 2022-04-15 NOTE — PROGRESS NOTES
Heart Failure Progress Note      Subjective:   Overnight events: none, reports much less bloated and walking around with out SOB    Medications:   Continuous Infusions:   furosemide (LASIX) 10 mg/mL infusion (non-titrating) 20 mg/hr (04/15/22 0954)    milrinone 20mg/100ml D5W (200mcg/ml) 0.5 mcg/kg/min (04/15/22 0530)       Scheduled Meds:   aspirin  81 mg Oral Daily    busPIRone  7.5 mg Oral Q12H    ferrous sulfate  1 tablet Oral Daily    heparin (porcine)  5,000 Units Subcutaneous Q8H    mirtazapine  15 mg Oral Nightly    mupirocin   Nasal BID    pantoprazole  40 mg Oral Daily    sacubitriL-valsartan  1 tablet Oral BID    spironolactone  12.5 mg Oral Daily    sucralfate  1 g Oral Nightly     PRN Meds:acetaminophen, dextrose 10%, dextrose 10%, glucagon (human recombinant), glucose, glucose, insulin aspart U-100, sodium chloride 0.9%  Objective:     Vitals:  Temp:  [97.4 °F (36.3 °C)-98.6 °F (37 °C)]   Pulse:  []   Resp:  [16-18]   BP: (105-122)/(61-70)   SpO2:  [94 %-100 %]  onRA  I/O's:    Intake/Output Summary (Last 24 hours) at 4/15/2022 1147  Last data filed at 4/15/2022 0900  Gross per 24 hour   Intake 1482 ml   Output 4375 ml   Net -2893 ml      HDs 4/15/22 at 8AM  SVO2: 53  CVP: 10--> 5  CO: 3.67  CI: 1.74 on milrinone .5  SVR: 1631  UOP ; 5.9L for shift, net negative 4.2 L on lasix gtt at 20  (calculated using oxygen consumption constant of 3.5)      Constitutional: No distress, obese, conversant  HEENT: Sclera anicteric, PERRLA, EOMI  Neck: No JVD, no masses, good movement  CV: RRR, S1 and S2 normal, no additional heart sounds or murmurs. Pulses 2+ and equal bilaterally in radial arteries, Johnathan's normal on right. Distal pulses are 2+ and equal in the femoral, DP and PT areas bilaterally  Pulm: Clear to auscultation bilaterally with symmetrical expansion. Chest wall palpated for reproduction of pain symptoms, and no pain was able to be produced on palpation or resistance  exercises  GI: Abdomen soft, non-tender, good bowel sounds  Extremities: Both extremities intact and grossly normal, skin is warm, PICC in right UE   Psych: AOx3, appropriate affect  Neuro: CNII-XII intact, no focal deficits        Labs:   No results found for: HGBA1C  Lab Results   Component Value Date     (H) 04/12/2022    BNP 1,970 (H) 12/01/2020     (H) 11/05/2020     No results found for: CHOL, HDL, LDLCALC, TRIG      Micro:   Blood Cultures  No results found for: LABBLOO  Urine Cultures  No results found for: LABURIN  Sputum Cultures    Imaging:     EF   Date Value Ref Range Status   04/08/2022 10 % Final       TTE: {Echo:   EF   Date Value Ref Range Status   04/08/2022 10 % Final           Assessment/Plan       38 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          # Acute decompensated HFrEF EF10%, ACC/AHA D, NYHA IIIb, [place] C    # NIDCM LVEDD 70 mm    # Cardiogenic shock, SCAI stage C    # Prior history of polysubstance abuse  # Tobacco use    - continue Milrinone increased to 0.5 mcg/kg/min   - consider decrease Lasix gtt 10 mg/hr, PICC in place  - Trend BMP, Mg BID while diuresing. Trend lactate, ionized calcium daily    - States has allergy to Bumex and torsemide with hives as his reaction    - He understands as we workup for VAD, he will need to stay off of any drugs, quit smoking            Signed:  Nita Sullivan M.D.  Cardiology Fellow  [facility]

## 2022-04-15 NOTE — PLAN OF CARE
Plan of care discussed with patient.  Patient remains free of falls and trauma. Patient ambulating independently, fall precautions in place. Patient has no complaints of pain. Patient has Milrinone infusing to [facility] PICC at 46.5 mcg/min (14 ml) and Furosemide currently infusing at 10mg/hr (1 ml).   Discussed medications and care. Patient has no questions at this time. Will continue to monitor.       Problem: Adult Inpatient Plan of Care  Goal: Plan of Care Review  Outcome: Ongoing, Progressing  Goal: Patient-Specific Goal (Individualized)  Outcome: Ongoing, Progressing  Goal: Absence of Hospital-Acquired Illness or Injury  Outcome: Ongoing, Progressing  Goal: Optimal Comfort and Wellbeing  Outcome: Ongoing, Progressing  Goal: Readiness for Transition of Care  Outcome: Ongoing, Progressing     Problem: Infection  Goal: Absence of Infection Signs and Symptoms  Outcome: Ongoing, Progressing     Problem: Adjustment to Illness (Heart Failure)  Goal: Optimal Coping  Outcome: Ongoing, Progressing     Problem: Cardiac Output Decreased (Heart Failure)  Goal: Optimal Cardiac Output  Outcome: Ongoing, Progressing     Problem: Dysrhythmia (Heart Failure)  Goal: Stable Heart Rate and Rhythm  Outcome: Ongoing, Progressing     Problem: Fluid Imbalance (Heart Failure)  Goal: Fluid Balance  Outcome: Ongoing, Progressing     Problem: Functional Ability Impaired (Heart Failure)  Goal: Optimal Functional Ability  Outcome: Ongoing, Progressing     Problem: Oral Intake Inadequate (Heart Failure)  Goal: Optimal Nutrition Intake  Outcome: Ongoing, Progressing     Problem: Respiratory Compromise (Heart Failure)  Goal: Effective Oxygenation and Ventilation  Outcome: Ongoing, Progressing     Problem: Sleep Disordered Breathing (Heart Failure)  Goal: Effective Breathing Pattern During Sleep  Outcome: Ongoing, Progressing     Problem: Cardiac Output Decreased  Goal: Effective Cardiac Output  Outcome: Ongoing, Progressing     Problem: Fluid and  Electrolyte Imbalance (Acute Kidney Injury/Impairment)  Goal: Fluid and Electrolyte Balance  Outcome: Ongoing, Progressing     Problem: Oral Intake Inadequate (Acute Kidney Injury/Impairment)  Goal: Optimal Nutrition Intake  Outcome: Ongoing, Progressing     Problem: Renal Function Impairment (Acute Kidney Injury/Impairment)  Goal: Effective Renal Function  Outcome: Ongoing, Progressing

## 2022-04-16 LAB
ALBUMIN SERPL BCP-MCNC: 4.1 G/DL (ref 3.5–5.2)
ALP SERPL-CCNC: 86 U/L (ref 55–135)
ALT SERPL W/O P-5'-P-CCNC: 28 U/L (ref 10–44)
ANION GAP SERPL CALC-SCNC: 15 MMOL/L (ref 8–16)
ANION GAP SERPL CALC-SCNC: 15 MMOL/L (ref 8–16)
AST SERPL-CCNC: 30 U/L (ref 10–40)
BASOPHILS # BLD AUTO: 0.05 K/UL (ref 0–0.2)
BASOPHILS NFR BLD: 0.6 % (ref 0–1.9)
BILIRUB SERPL-MCNC: 0.8 MG/DL (ref 0.1–1)
BUN SERPL-MCNC: 29 MG/DL (ref 6–20)
BUN SERPL-MCNC: 29 MG/DL (ref 6–20)
CALCIUM SERPL-MCNC: 10.2 MG/DL (ref 8.7–10.5)
CALCIUM SERPL-MCNC: 10.2 MG/DL (ref 8.7–10.5)
CHLORIDE SERPL-SCNC: 97 MMOL/L (ref 95–110)
CHLORIDE SERPL-SCNC: 97 MMOL/L (ref 95–110)
CO2 SERPL-SCNC: 26 MMOL/L (ref 23–29)
CO2 SERPL-SCNC: 26 MMOL/L (ref 23–29)
CREAT SERPL-MCNC: 1.6 MG/DL (ref 0.5–1.4)
CREAT SERPL-MCNC: 1.6 MG/DL (ref 0.5–1.4)
DIFFERENTIAL METHOD: ABNORMAL
EOSINOPHIL # BLD AUTO: 0.1 K/UL (ref 0–0.5)
EOSINOPHIL NFR BLD: 1.5 % (ref 0–8)
ERYTHROCYTE [DISTWIDTH] IN BLOOD BY AUTOMATED COUNT: 15.5 % (ref 11.5–14.5)
EST. GFR  (AFRICAN AMERICAN): >60 ML/MIN/1.73 M^2
EST. GFR  (AFRICAN AMERICAN): >60 ML/MIN/1.73 M^2
EST. GFR  (NON AFRICAN AMERICAN): 54.2 ML/MIN/1.73 M^2
EST. GFR  (NON AFRICAN AMERICAN): 54.2 ML/MIN/1.73 M^2
GLUCOSE SERPL-MCNC: 172 MG/DL (ref 70–110)
GLUCOSE SERPL-MCNC: 172 MG/DL (ref 70–110)
HCT VFR BLD AUTO: 44.3 % (ref 40–54)
HGB BLD-MCNC: 14.5 G/DL (ref 14–18)
IMM GRANULOCYTES # BLD AUTO: 0.02 K/UL (ref 0–0.04)
IMM GRANULOCYTES NFR BLD AUTO: 0.2 % (ref 0–0.5)
LYMPHOCYTES # BLD AUTO: 1.7 K/UL (ref 1–4.8)
LYMPHOCYTES NFR BLD: 20.1 % (ref 18–48)
MAGNESIUM SERPL-MCNC: 2.2 MG/DL (ref 1.6–2.6)
MCH RBC QN AUTO: 31.3 PG (ref 27–31)
MCHC RBC AUTO-ENTMCNC: 32.7 G/DL (ref 32–36)
MCV RBC AUTO: 96 FL (ref 82–98)
MONOCYTES # BLD AUTO: 0.9 K/UL (ref 0.3–1)
MONOCYTES NFR BLD: 10.9 % (ref 4–15)
NEUTROPHILS # BLD AUTO: 5.7 K/UL (ref 1.8–7.7)
NEUTROPHILS NFR BLD: 66.7 % (ref 38–73)
NRBC BLD-RTO: 0 /100 WBC
PLATELET # BLD AUTO: 258 K/UL (ref 150–450)
PMV BLD AUTO: 9.7 FL (ref 9.2–12.9)
POCT GLUCOSE: 151 MG/DL (ref 70–110)
POCT GLUCOSE: 190 MG/DL (ref 70–110)
POCT GLUCOSE: 197 MG/DL (ref 70–110)
POTASSIUM SERPL-SCNC: 4 MMOL/L (ref 3.5–5.1)
POTASSIUM SERPL-SCNC: 4 MMOL/L (ref 3.5–5.1)
PROT SERPL-MCNC: 8.1 G/DL (ref 6–8.4)
RBC # BLD AUTO: 4.63 M/UL (ref 4.6–6.2)
SODIUM SERPL-SCNC: 138 MMOL/L (ref 136–145)
SODIUM SERPL-SCNC: 138 MMOL/L (ref 136–145)
WBC # BLD AUTO: 8.51 K/UL (ref 3.9–12.7)

## 2022-04-16 PROCEDURE — 85025 COMPLETE CBC W/AUTO DIFF WBC: CPT | Mod: NTX | Performed by: INTERNAL MEDICINE

## 2022-04-16 PROCEDURE — 20600001 HC STEP DOWN PRIVATE ROOM: Mod: NTX

## 2022-04-16 PROCEDURE — 25000003 PHARM REV CODE 250: Mod: NTX | Performed by: NURSE PRACTITIONER

## 2022-04-16 PROCEDURE — 63600175 PHARM REV CODE 636 W HCPCS: Mod: NTX | Performed by: NURSE PRACTITIONER

## 2022-04-16 PROCEDURE — 80053 COMPREHEN METABOLIC PANEL: CPT | Mod: NTX | Performed by: NURSE PRACTITIONER

## 2022-04-16 PROCEDURE — 63600175 PHARM REV CODE 636 W HCPCS: Mod: NTX | Performed by: STUDENT IN AN ORGANIZED HEALTH CARE EDUCATION/TRAINING PROGRAM

## 2022-04-16 PROCEDURE — 63600175 PHARM REV CODE 636 W HCPCS: Mod: NTX | Performed by: INTERNAL MEDICINE

## 2022-04-16 PROCEDURE — 83735 ASSAY OF MAGNESIUM: CPT | Mod: NTX | Performed by: NURSE PRACTITIONER

## 2022-04-16 RX ORDER — SPIRONOLACTONE 25 MG/1
25 TABLET ORAL DAILY
Status: DISCONTINUED | OUTPATIENT
Start: 2022-04-17 | End: 2022-04-25 | Stop reason: HOSPADM

## 2022-04-16 RX ADMIN — FERROUS SULFATE TAB 325 MG (65 MG ELEMENTAL FE) 1 EACH: 325 (65 FE) TAB at 09:04

## 2022-04-16 RX ADMIN — MILRINONE LACTATE IN DEXTROSE 0.5 MCG/KG/MIN: 200 INJECTION, SOLUTION INTRAVENOUS at 05:04

## 2022-04-16 RX ADMIN — FUROSEMIDE 5 MG/HR: 10 INJECTION, SOLUTION INTRAMUSCULAR; INTRAVENOUS at 09:04

## 2022-04-16 RX ADMIN — INSULIN ASPART 1 UNITS: 100 INJECTION, SOLUTION INTRAVENOUS; SUBCUTANEOUS at 08:04

## 2022-04-16 RX ADMIN — PANTOPRAZOLE SODIUM 40 MG: 40 TABLET, DELAYED RELEASE ORAL at 09:04

## 2022-04-16 RX ADMIN — MIRTAZAPINE 15 MG: 15 TABLET, FILM COATED ORAL at 08:04

## 2022-04-16 RX ADMIN — BUSPIRONE HYDROCHLORIDE 7.5 MG: 5 TABLET ORAL at 09:04

## 2022-04-16 RX ADMIN — SACUBITRIL AND VALSARTAN 1 TABLET: 24; 26 TABLET, FILM COATED ORAL at 08:04

## 2022-04-16 RX ADMIN — ASPIRIN 81 MG CHEWABLE TABLET 81 MG: 81 TABLET CHEWABLE at 09:04

## 2022-04-16 RX ADMIN — MILRINONE LACTATE IN DEXTROSE 0.5 MCG/KG/MIN: 200 INJECTION, SOLUTION INTRAVENOUS at 12:04

## 2022-04-16 RX ADMIN — BUSPIRONE HYDROCHLORIDE 7.5 MG: 5 TABLET ORAL at 08:04

## 2022-04-16 RX ADMIN — MILRINONE LACTATE IN DEXTROSE 0.5 MCG/KG/MIN: 200 INJECTION, SOLUTION INTRAVENOUS at 09:04

## 2022-04-16 RX ADMIN — HEPARIN SODIUM 5000 UNITS: 5000 INJECTION INTRAVENOUS; SUBCUTANEOUS at 02:04

## 2022-04-16 RX ADMIN — SPIRONOLACTONE 12.5 MG: 25 TABLET, FILM COATED ORAL at 09:04

## 2022-04-16 RX ADMIN — SUCRALFATE 1 G: 1 TABLET ORAL at 08:04

## 2022-04-16 RX ADMIN — SACUBITRIL AND VALSARTAN 1 TABLET: 24; 26 TABLET, FILM COATED ORAL at 09:04

## 2022-04-16 NOTE — PROGRESS NOTES
Heart Failure Progress Note      Subjective:     No events overnight. Feels well this AM.     Medications:   Continuous Infusions:   furosemide (LASIX) 10 mg/mL infusion (non-titrating) 10 mg/hr (04/15/22 1404)    milrinone 20mg/100ml D5W (200mcg/ml) 0.5 mcg/kg/min (04/16/22 0530)       Scheduled Meds:   aspirin  81 mg Oral Daily    busPIRone  7.5 mg Oral Q12H    ferrous sulfate  1 tablet Oral Daily    heparin (porcine)  5,000 Units Subcutaneous Q8H    mirtazapine  15 mg Oral Nightly    mupirocin   Nasal BID    pantoprazole  40 mg Oral Daily    sacubitriL-valsartan  1 tablet Oral BID    spironolactone  12.5 mg Oral Daily    sucralfate  1 g Oral Nightly     PRN Meds:acetaminophen, dextrose 10%, dextrose 10%, glucagon (human recombinant), glucose, glucose, insulin aspart U-100, sodium chloride 0.9%  Objective:     Vitals:  Temp:  [97.5 °F (36.4 °C)-98.2 °F (36.8 °C)]   Pulse:  []   Resp:  [16-20]   BP: (100-126)/(64-76)   SpO2:  [95 %-97 %]  onRA  I/O's:    Intake/Output Summary (Last 24 hours) at 4/16/2022 0951  Last data filed at 4/16/2022 0600  Gross per 24 hour   Intake 1124 ml   Output 3200 ml   Net -2076 ml        Constitutional: No distress, obese, conversant  HEENT: Sclera anicteric, PERRLA, EOMI  Neck: JVP at the clavicle, no masses, good movement  CV: RRR, S1 and S2 normal, no additional heart sounds or murmurs. Pulses 2+ and equal bilaterally in radial arteries, Johnathan's normal on right. Distal pulses are 2+ and equal in the femoral, DP and PT areas bilaterally  Pulm: Clear to auscultation bilaterally with symmetrical expansion. Chest wall palpated for reproduction of pain symptoms, and no pain was able to be produced on palpation or resistance exercises  GI: Abdomen soft, non-tender, good bowel sounds  Extremities: Both extremities intact and grossly normal, skin is warm, PICC in right UE   Psych: AOx3, appropriate affect  Neuro: CNII-XII intact, no focal deficits        Labs:   No  results found for: HGBA1C  Lab Results   Component Value Date     (H) 04/12/2022    BNP 1,970 (H) 12/01/2020     (H) 11/05/2020     No results found for: CHOL, HDL, LDLCALC, TRIG      Imaging:     TTE 4/8/22  · The left ventricle is severely enlarged with severely decreased systolic function.  · The estimated ejection fraction is 10%. There is severe left ventricular global hypokinesis.  · Grade III left ventricular diastolic dysfunction.  · Moderate-to-severe mitral regurgitation.  · Severe right ventricular enlargement with moderately to severely reduced right ventricular systolic function.  · Mild tricuspid regurgitation.  · The estimated PA systolic pressure is 58 mmHg.  · Intermediate central venous pressure (8 mmHg).  · There is pulmonary hypertension.  · Severe left atrial enlargement.         Assessment/Plan       38 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          # Acute decompensated HFrEF EF10%, ACC/AHA D, NYHA IIIb, Roper C    # NIDCM LVEDD 70 mm    # Cardiogenic shock, SCAI stage C    # Prior history of polysubstance abuse  # Tobacco use    - continue Milrinone 0.5 mcg/kg/min (home dose is 0.25)   - Decrease furosemide to 5 mg/hr  - Increase spironolactone to 25 mg daily  - Continue Entresto 24-26 mg BID  - RHC possibly on Monday  - He understands as we workup for VAD, he will need to stay off of any drugs, quit smoking        Franco Alvarado MD  Cardiology Fellow, PGY6

## 2022-04-16 NOTE — PLAN OF CARE
Problem: Adult Inpatient Plan of Care  Goal: Plan of Care Review  Outcome: Ongoing, Progressing  Goal: Patient-Specific Goal (Individualized)  Outcome: Ongoing, Progressing  Goal: Absence of Hospital-Acquired Illness or Injury  Outcome: Ongoing, Progressing  Goal: Optimal Comfort and Wellbeing  Outcome: Ongoing, Progressing  Goal: Readiness for Transition of Care  Outcome: Ongoing, Progressing     Problem: Infection  Goal: Absence of Infection Signs and Symptoms  Outcome: Ongoing, Progressing     Problem: Adjustment to Illness (Heart Failure)  Goal: Optimal Coping  Outcome: Ongoing, Progressing     Problem: Cardiac Output Decreased (Heart Failure)  Goal: Optimal Cardiac Output  Outcome: Ongoing, Progressing     Problem: Dysrhythmia (Heart Failure)  Goal: Stable Heart Rate and Rhythm  Outcome: Ongoing, Progressing     Problem: Fluid Imbalance (Heart Failure)  Goal: Fluid Balance  Outcome: Ongoing, Progressing     Problem: Functional Ability Impaired (Heart Failure)  Goal: Optimal Functional Ability  Outcome: Ongoing, Progressing     Problem: Oral Intake Inadequate (Heart Failure)  Goal: Optimal Nutrition Intake  Outcome: Ongoing, Progressing     Problem: Respiratory Compromise (Heart Failure)  Goal: Effective Oxygenation and Ventilation  Outcome: Ongoing, Progressing     Problem: Sleep Disordered Breathing (Heart Failure)  Goal: Effective Breathing Pattern During Sleep  Outcome: Ongoing, Progressing     Problem: Cardiac Output Decreased  Goal: Effective Cardiac Output  Outcome: Ongoing, Progressing     Problem: Fluid and Electrolyte Imbalance (Acute Kidney Injury/Impairment)  Goal: Fluid and Electrolyte Balance  Outcome: Ongoing, Progressing     Problem: Oral Intake Inadequate (Acute Kidney Injury/Impairment)  Goal: Optimal Nutrition Intake  Outcome: Ongoing, Progressing     Problem: Renal Function Impairment (Acute Kidney Injury/Impairment)  Goal: Effective Renal Function  Outcome: Ongoing, Progressing

## 2022-04-16 NOTE — NURSING
"Plan of care reviewed with patient and family at bedside, Pt alert x4, ambulates independently. Milrinone gtt infusing at 14ml/hr, along with  Lasix 1ml/hr. Strict I /O's .vitally stable. Running sinus tach on monitor. Pt refused heparin shot, ambulated halls. Education provided on the importance. No complaints throughout the night. Will continue monitor.     /66 (BP Location: Left arm, Patient Position: Sitting)   Pulse 101   Temp 98.2 °F (36.8 °C) (Oral)   Resp 20   Ht 6' 3" (1.905 m)   Wt 90.9 kg (200 lb 6.4 oz)   SpO2 96%   BMI 25.05 kg/m²     "

## 2022-04-17 LAB
ALBUMIN SERPL BCP-MCNC: 4 G/DL (ref 3.5–5.2)
ALP SERPL-CCNC: 82 U/L (ref 55–135)
ALT SERPL W/O P-5'-P-CCNC: 27 U/L (ref 10–44)
ANION GAP SERPL CALC-SCNC: 12 MMOL/L (ref 8–16)
ANION GAP SERPL CALC-SCNC: 12 MMOL/L (ref 8–16)
AST SERPL-CCNC: 33 U/L (ref 10–40)
BASOPHILS # BLD AUTO: 0.03 K/UL (ref 0–0.2)
BASOPHILS NFR BLD: 0.4 % (ref 0–1.9)
BILIRUB SERPL-MCNC: 1 MG/DL (ref 0.1–1)
BUN SERPL-MCNC: 31 MG/DL (ref 6–20)
BUN SERPL-MCNC: 31 MG/DL (ref 6–20)
CALCIUM SERPL-MCNC: 10.2 MG/DL (ref 8.7–10.5)
CALCIUM SERPL-MCNC: 10.2 MG/DL (ref 8.7–10.5)
CHLORIDE SERPL-SCNC: 94 MMOL/L (ref 95–110)
CHLORIDE SERPL-SCNC: 94 MMOL/L (ref 95–110)
CO2 SERPL-SCNC: 28 MMOL/L (ref 23–29)
CO2 SERPL-SCNC: 28 MMOL/L (ref 23–29)
CREAT SERPL-MCNC: 1.7 MG/DL (ref 0.5–1.4)
CREAT SERPL-MCNC: 1.7 MG/DL (ref 0.5–1.4)
DIFFERENTIAL METHOD: ABNORMAL
EOSINOPHIL # BLD AUTO: 0.2 K/UL (ref 0–0.5)
EOSINOPHIL NFR BLD: 2.7 % (ref 0–8)
ERYTHROCYTE [DISTWIDTH] IN BLOOD BY AUTOMATED COUNT: 15.1 % (ref 11.5–14.5)
EST. GFR  (AFRICAN AMERICAN): 58.3 ML/MIN/1.73 M^2
EST. GFR  (AFRICAN AMERICAN): 58.3 ML/MIN/1.73 M^2
EST. GFR  (NON AFRICAN AMERICAN): 50.4 ML/MIN/1.73 M^2
EST. GFR  (NON AFRICAN AMERICAN): 50.4 ML/MIN/1.73 M^2
GLUCOSE SERPL-MCNC: 151 MG/DL (ref 70–110)
GLUCOSE SERPL-MCNC: 151 MG/DL (ref 70–110)
HCT VFR BLD AUTO: 43.4 % (ref 40–54)
HGB BLD-MCNC: 14.2 G/DL (ref 14–18)
IMM GRANULOCYTES # BLD AUTO: 0.01 K/UL (ref 0–0.04)
IMM GRANULOCYTES NFR BLD AUTO: 0.1 % (ref 0–0.5)
LYMPHOCYTES # BLD AUTO: 1.5 K/UL (ref 1–4.8)
LYMPHOCYTES NFR BLD: 22.2 % (ref 18–48)
MAGNESIUM SERPL-MCNC: 1.9 MG/DL (ref 1.6–2.6)
MCH RBC QN AUTO: 30.6 PG (ref 27–31)
MCHC RBC AUTO-ENTMCNC: 32.7 G/DL (ref 32–36)
MCV RBC AUTO: 94 FL (ref 82–98)
MONOCYTES # BLD AUTO: 0.8 K/UL (ref 0.3–1)
MONOCYTES NFR BLD: 11.1 % (ref 4–15)
NEUTROPHILS # BLD AUTO: 4.4 K/UL (ref 1.8–7.7)
NEUTROPHILS NFR BLD: 63.5 % (ref 38–73)
NRBC BLD-RTO: 0 /100 WBC
PLATELET # BLD AUTO: 266 K/UL (ref 150–450)
PMV BLD AUTO: 9.4 FL (ref 9.2–12.9)
POCT GLUCOSE: 136 MG/DL (ref 70–110)
POCT GLUCOSE: 162 MG/DL (ref 70–110)
POCT GLUCOSE: 202 MG/DL (ref 70–110)
POCT GLUCOSE: 228 MG/DL (ref 70–110)
POTASSIUM SERPL-SCNC: 3.9 MMOL/L (ref 3.5–5.1)
POTASSIUM SERPL-SCNC: 3.9 MMOL/L (ref 3.5–5.1)
PROT SERPL-MCNC: 7.6 G/DL (ref 6–8.4)
RBC # BLD AUTO: 4.64 M/UL (ref 4.6–6.2)
SODIUM SERPL-SCNC: 134 MMOL/L (ref 136–145)
SODIUM SERPL-SCNC: 134 MMOL/L (ref 136–145)
WBC # BLD AUTO: 6.94 K/UL (ref 3.9–12.7)

## 2022-04-17 PROCEDURE — 80053 COMPREHEN METABOLIC PANEL: CPT | Mod: NTX | Performed by: NURSE PRACTITIONER

## 2022-04-17 PROCEDURE — 83735 ASSAY OF MAGNESIUM: CPT | Mod: NTX | Performed by: NURSE PRACTITIONER

## 2022-04-17 PROCEDURE — 20600001 HC STEP DOWN PRIVATE ROOM: Mod: NTX

## 2022-04-17 PROCEDURE — 63600175 PHARM REV CODE 636 W HCPCS: Mod: NTX | Performed by: INTERNAL MEDICINE

## 2022-04-17 PROCEDURE — 63600175 PHARM REV CODE 636 W HCPCS: Mod: NTX | Performed by: STUDENT IN AN ORGANIZED HEALTH CARE EDUCATION/TRAINING PROGRAM

## 2022-04-17 PROCEDURE — 85025 COMPLETE CBC W/AUTO DIFF WBC: CPT | Mod: NTX | Performed by: NURSE PRACTITIONER

## 2022-04-17 PROCEDURE — 63600175 PHARM REV CODE 636 W HCPCS: Mod: NTX | Performed by: NURSE PRACTITIONER

## 2022-04-17 PROCEDURE — 25000003 PHARM REV CODE 250: Mod: NTX | Performed by: INTERNAL MEDICINE

## 2022-04-17 PROCEDURE — 25000003 PHARM REV CODE 250: Mod: NTX | Performed by: NURSE PRACTITIONER

## 2022-04-17 RX ORDER — FUROSEMIDE 80 MG/1
80 TABLET ORAL 2 TIMES DAILY
Status: DISCONTINUED | OUTPATIENT
Start: 2022-04-17 | End: 2022-04-19

## 2022-04-17 RX ORDER — POTASSIUM CHLORIDE 20 MEQ/1
20 TABLET, EXTENDED RELEASE ORAL ONCE
Status: COMPLETED | OUTPATIENT
Start: 2022-04-17 | End: 2022-04-17

## 2022-04-17 RX ORDER — MAGNESIUM SULFATE 1 G/100ML
1 INJECTION INTRAVENOUS ONCE
Status: COMPLETED | OUTPATIENT
Start: 2022-04-17 | End: 2022-04-17

## 2022-04-17 RX ADMIN — SACUBITRIL AND VALSARTAN 1 TABLET: 24; 26 TABLET, FILM COATED ORAL at 08:04

## 2022-04-17 RX ADMIN — MIRTAZAPINE 15 MG: 15 TABLET, FILM COATED ORAL at 08:04

## 2022-04-17 RX ADMIN — MILRINONE LACTATE IN DEXTROSE 0.5 MCG/KG/MIN: 200 INJECTION, SOLUTION INTRAVENOUS at 06:04

## 2022-04-17 RX ADMIN — HEPARIN SODIUM 5000 UNITS: 5000 INJECTION INTRAVENOUS; SUBCUTANEOUS at 08:04

## 2022-04-17 RX ADMIN — MILRINONE LACTATE IN DEXTROSE 0.5 MCG/KG/MIN: 200 INJECTION, SOLUTION INTRAVENOUS at 01:04

## 2022-04-17 RX ADMIN — SACUBITRIL AND VALSARTAN 1 TABLET: 24; 26 TABLET, FILM COATED ORAL at 09:04

## 2022-04-17 RX ADMIN — BUSPIRONE HYDROCHLORIDE 7.5 MG: 5 TABLET ORAL at 09:04

## 2022-04-17 RX ADMIN — FUROSEMIDE 80 MG: 80 TABLET ORAL at 05:04

## 2022-04-17 RX ADMIN — PANTOPRAZOLE SODIUM 40 MG: 40 TABLET, DELAYED RELEASE ORAL at 09:04

## 2022-04-17 RX ADMIN — MILRINONE LACTATE IN DEXTROSE 0.5 MCG/KG/MIN: 200 INJECTION, SOLUTION INTRAVENOUS at 09:04

## 2022-04-17 RX ADMIN — HEPARIN SODIUM 5000 UNITS: 5000 INJECTION INTRAVENOUS; SUBCUTANEOUS at 01:04

## 2022-04-17 RX ADMIN — SUCRALFATE 1 G: 1 TABLET ORAL at 08:04

## 2022-04-17 RX ADMIN — SPIRONOLACTONE 25 MG: 25 TABLET, FILM COATED ORAL at 09:04

## 2022-04-17 RX ADMIN — FERROUS SULFATE TAB 325 MG (65 MG ELEMENTAL FE) 1 EACH: 325 (65 FE) TAB at 09:04

## 2022-04-17 RX ADMIN — POTASSIUM CHLORIDE 20 MEQ: 20 TABLET, EXTENDED RELEASE ORAL at 09:04

## 2022-04-17 RX ADMIN — BUSPIRONE HYDROCHLORIDE 7.5 MG: 5 TABLET ORAL at 08:04

## 2022-04-17 RX ADMIN — ASPIRIN 81 MG CHEWABLE TABLET 81 MG: 81 TABLET CHEWABLE at 09:04

## 2022-04-17 RX ADMIN — MAGNESIUM SULFATE HEPTAHYDRATE 1 G: 500 INJECTION, SOLUTION INTRAMUSCULAR; INTRAVENOUS at 09:04

## 2022-04-17 NOTE — NURSING
"No acute changes throughout the night, patient remains alert x 4, ambulatory, Vitally stable. Running between sinus tach and Normal sinus, Milrinone and lasix infusing at 14ml and 0.5ml respectively, No complaints, fall precautions maintained. Strict I/os. Blood sugar covered appropriately  Will continue to monitor.      BP (!) 121/57 (BP Location: Left arm, Patient Position: Lying)   Pulse 109   Temp 97.9 °F (36.6 °C) (Oral)   Resp 18   Ht 6' 3" (1.905 m)   Wt 90.9 kg (200 lb 6.4 oz)   SpO2 96%   BMI 25.05 kg/m²     "

## 2022-04-17 NOTE — PROGRESS NOTES
Heart Failure Progress Note      Subjective:     Feels well this morning, walking around the unit. No issues overnight.     Medications:   Continuous Infusions:   furosemide (LASIX) 10 mg/mL infusion (non-titrating) 5 mg/hr (04/16/22 2116)    milrinone 20mg/100ml D5W (200mcg/ml) 0.5 mcg/kg/min (04/17/22 0619)       Scheduled Meds:   aspirin  81 mg Oral Daily    busPIRone  7.5 mg Oral Q12H    ferrous sulfate  1 tablet Oral Daily    heparin (porcine)  5,000 Units Subcutaneous Q8H    mirtazapine  15 mg Oral Nightly    mupirocin   Nasal BID    pantoprazole  40 mg Oral Daily    sacubitriL-valsartan  1 tablet Oral BID    spironolactone  25 mg Oral Daily    sucralfate  1 g Oral Nightly     PRN Meds:acetaminophen, dextrose 10%, dextrose 10%, glucagon (human recombinant), glucose, glucose, insulin aspart U-100, sodium chloride 0.9%  Objective:     Vitals:  Temp:  [97.1 °F (36.2 °C)-97.9 °F (36.6 °C)]   Pulse:  []   Resp:  [18-20]   BP: (103-121)/(55-84)   SpO2:  [96 %-98 %]  onRA  I/O's:    Intake/Output Summary (Last 24 hours) at 4/17/2022 1144  Last data filed at 4/16/2022 1800  Gross per 24 hour   Intake 1060 ml   Output 1800 ml   Net -740 ml        Constitutional: No distress, obese, conversant  HEENT: Sclera anicteric, PERRLA, EOMI  Neck: JVP at the clavicle, no masses, good movement  CV: RRR, S1 and S2 normal, no additional heart sounds or murmurs. Pulses 2+ and equal bilaterally in radial arteries, Johnathan's normal on right. Distal pulses are 2+ and equal in the femoral, DP and PT areas bilaterally  Pulm: Clear to auscultation bilaterally with symmetrical expansion. Chest wall palpated for reproduction of pain symptoms, and no pain was able to be produced on palpation or resistance exercises  GI: Abdomen soft, non-tender, good bowel sounds  Extremities: Both extremities intact and grossly normal, skin is warm, PICC in right UE   Psych: AOx3, appropriate affect  Neuro: CNII-XII intact, no focal  deficits        Labs:   No results found for: HGBA1C  Lab Results   Component Value Date     (H) 04/12/2022    BNP 1,970 (H) 12/01/2020     (H) 11/05/2020     No results found for: CHOL, HDL, LDLCALC, TRIG      Imaging:     TTE 4/8/22  · The left ventricle is severely enlarged with severely decreased systolic function.  · The estimated ejection fraction is 10%. There is severe left ventricular global hypokinesis.  · Grade III left ventricular diastolic dysfunction.  · Moderate-to-severe mitral regurgitation.  · Severe right ventricular enlargement with moderately to severely reduced right ventricular systolic function.  · Mild tricuspid regurgitation.  · The estimated PA systolic pressure is 58 mmHg.  · Intermediate central venous pressure (8 mmHg).  · There is pulmonary hypertension.  · Severe left atrial enlargement.         Assessment/Plan       36 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          # Acute decompensated HFrEF EF10%, ACC/AHA D, NYHA IIIb, Roper C    # NIDCM LVEDD 70 mm    # Cardiogenic shock, SCAI stage C    # Prior history of polysubstance abuse  # Tobacco use    - continue Milrinone 0.5 mcg/kg/min (home dose is 0.25), need to arrange new dose with   - Switch to frusemide 80 mg PO starting tonight  - Continue spironolactone to 25 mg daily  - Continue Entresto 24-26 mg BID  - Plan for RHC tomorrow or Tuesday  - He understands as we workup for VAD, he will need to stay off of any drugs, quit smoking        Franco Alvarado MD  Cardiology Fellow, PGY6

## 2022-04-17 NOTE — NURSING
Plan of care discussed with patient.  Patient remains free of falls and trauma. Patient ambulating independently. Patient voices no complaints of pain. Milrinone continues to infuse to MELISSA PICC at 46.5 mcg/min (14 ml).  Furosemide gtt discontinued, patient currently on lasix po, first dose administered this evening, see MAR. Discussed medications and care. Patient has no questions at this time. Will continue to monitor.

## 2022-04-18 ENCOUNTER — TELEPHONE (OUTPATIENT)
Dept: ADMINISTRATIVE | Facility: HOSPITAL | Age: 37
End: 2022-04-18
Payer: MEDICAID

## 2022-04-18 LAB
ALBUMIN SERPL BCP-MCNC: 3.8 G/DL (ref 3.5–5.2)
ALLENS TEST: ABNORMAL
ALP SERPL-CCNC: 80 U/L (ref 55–135)
ALT SERPL W/O P-5'-P-CCNC: 30 U/L (ref 10–44)
ANION GAP SERPL CALC-SCNC: 11 MMOL/L (ref 8–16)
ANION GAP SERPL CALC-SCNC: 11 MMOL/L (ref 8–16)
AST SERPL-CCNC: 31 U/L (ref 10–40)
BASOPHILS # BLD AUTO: 0.05 K/UL (ref 0–0.2)
BASOPHILS NFR BLD: 0.7 % (ref 0–1.9)
BILIRUB DIRECT SERPL-MCNC: 0.2 MG/DL (ref 0.1–0.3)
BILIRUB SERPL-MCNC: 0.7 MG/DL (ref 0.1–1)
BILIRUB UR QL STRIP: NEGATIVE
BNP SERPL-MCNC: 145 PG/ML (ref 0–99)
BUN SERPL-MCNC: 33 MG/DL (ref 6–20)
BUN SERPL-MCNC: 33 MG/DL (ref 6–20)
CALCIUM SERPL-MCNC: 10.1 MG/DL (ref 8.7–10.5)
CALCIUM SERPL-MCNC: 10.1 MG/DL (ref 8.7–10.5)
CHLORIDE SERPL-SCNC: 98 MMOL/L (ref 95–110)
CHLORIDE SERPL-SCNC: 98 MMOL/L (ref 95–110)
CHOLEST SERPL-MCNC: 261 MG/DL (ref 120–199)
CHOLEST/HDLC SERPL: 4.8 {RATIO} (ref 2–5)
CLARITY UR REFRACT.AUTO: CLEAR
CO2 SERPL-SCNC: 27 MMOL/L (ref 23–29)
CO2 SERPL-SCNC: 27 MMOL/L (ref 23–29)
COLOR UR AUTO: ABNORMAL
CREAT SERPL-MCNC: 1.6 MG/DL (ref 0.5–1.4)
CREAT SERPL-MCNC: 1.6 MG/DL (ref 0.5–1.4)
DELSYS: ABNORMAL
DIFFERENTIAL METHOD: ABNORMAL
EOSINOPHIL # BLD AUTO: 0.3 K/UL (ref 0–0.5)
EOSINOPHIL NFR BLD: 3.4 % (ref 0–8)
ERYTHROCYTE [DISTWIDTH] IN BLOOD BY AUTOMATED COUNT: 14.9 % (ref 11.5–14.5)
EST. GFR  (AFRICAN AMERICAN): >60 ML/MIN/1.73 M^2
EST. GFR  (AFRICAN AMERICAN): >60 ML/MIN/1.73 M^2
EST. GFR  (NON AFRICAN AMERICAN): 54.2 ML/MIN/1.73 M^2
EST. GFR  (NON AFRICAN AMERICAN): 54.2 ML/MIN/1.73 M^2
ESTIMATED AVG GLUCOSE: 131 MG/DL (ref 68–131)
FERRITIN SERPL-MCNC: 150 NG/ML (ref 20–300)
FIO2: 21
GLUCOSE SERPL-MCNC: 157 MG/DL (ref 70–110)
GLUCOSE SERPL-MCNC: 157 MG/DL (ref 70–110)
GLUCOSE UR QL STRIP: NEGATIVE
HBA1C MFR BLD: 6.2 % (ref 4–5.6)
HCO3 UR-SCNC: 31.5 MMOL/L (ref 24–28)
HCT VFR BLD AUTO: 41.1 % (ref 40–54)
HDLC SERPL-MCNC: 54 MG/DL (ref 40–75)
HDLC SERPL: 20.7 % (ref 20–50)
HGB BLD-MCNC: 13.5 G/DL (ref 14–18)
HGB UR QL STRIP: ABNORMAL
IMM GRANULOCYTES # BLD AUTO: 0.01 K/UL (ref 0–0.04)
IMM GRANULOCYTES NFR BLD AUTO: 0.1 % (ref 0–0.5)
IRON SERPL-MCNC: 243 UG/DL (ref 45–160)
KETONES UR QL STRIP: NEGATIVE
LDH SERPL L TO P-CCNC: 250 U/L (ref 110–260)
LDLC SERPL CALC-MCNC: ABNORMAL MG/DL (ref 63–159)
LEUKOCYTE ESTERASE UR QL STRIP: NEGATIVE
LYMPHOCYTES # BLD AUTO: 1.8 K/UL (ref 1–4.8)
LYMPHOCYTES NFR BLD: 24.1 % (ref 18–48)
MAGNESIUM SERPL-MCNC: 2.2 MG/DL (ref 1.6–2.6)
MAGNESIUM SERPL-MCNC: 2.3 MG/DL (ref 1.6–2.6)
MCH RBC QN AUTO: 31.3 PG (ref 27–31)
MCHC RBC AUTO-ENTMCNC: 32.8 G/DL (ref 32–36)
MCV RBC AUTO: 95 FL (ref 82–98)
MICROSCOPIC COMMENT: ABNORMAL
MODE: ABNORMAL
MONOCYTES # BLD AUTO: 0.8 K/UL (ref 0.3–1)
MONOCYTES NFR BLD: 11.2 % (ref 4–15)
NEUTROPHILS # BLD AUTO: 4.4 K/UL (ref 1.8–7.7)
NEUTROPHILS NFR BLD: 60.5 % (ref 38–73)
NITRITE UR QL STRIP: NEGATIVE
NONHDLC SERPL-MCNC: 207 MG/DL
NRBC BLD-RTO: 0 /100 WBC
PCO2 BLDA: 53.6 MMHG (ref 35–45)
PH SMN: 7.38 [PH] (ref 7.35–7.45)
PH UR STRIP: 7 [PH] (ref 5–8)
PHOSPHATE SERPL-MCNC: 3.5 MG/DL (ref 2.7–4.5)
PLATELET # BLD AUTO: 224 K/UL (ref 150–450)
PMV BLD AUTO: 9.5 FL (ref 9.2–12.9)
PO2 BLDA: 32 MMHG (ref 40–60)
POC BE: 6 MMOL/L
POC SATURATED O2: 58 % (ref 95–100)
POC TCO2: 33 MMOL/L (ref 24–29)
POCT GLUCOSE: 119 MG/DL (ref 70–110)
POCT GLUCOSE: 138 MG/DL (ref 70–110)
POCT GLUCOSE: 156 MG/DL (ref 70–110)
POCT GLUCOSE: 198 MG/DL (ref 70–110)
POCT GLUCOSE: 243 MG/DL (ref 70–110)
POTASSIUM SERPL-SCNC: 4.1 MMOL/L (ref 3.5–5.1)
POTASSIUM SERPL-SCNC: 4.1 MMOL/L (ref 3.5–5.1)
PREALB SERPL-MCNC: 36 MG/DL (ref 20–43)
PROT SERPL-MCNC: 7.3 G/DL (ref 6–8.4)
PROT UR QL STRIP: NEGATIVE
RBC # BLD AUTO: 4.31 M/UL (ref 4.6–6.2)
RBC #/AREA URNS AUTO: 5 /HPF (ref 0–4)
SAMPLE: ABNORMAL
SITE: ABNORMAL
SODIUM SERPL-SCNC: 136 MMOL/L (ref 136–145)
SODIUM SERPL-SCNC: 136 MMOL/L (ref 136–145)
SP GR UR STRIP: 1.01 (ref 1–1.03)
TRANSFERRIN SERPL-MCNC: 334 MG/DL (ref 200–375)
TRIGL SERPL-MCNC: 496 MG/DL (ref 30–150)
URATE SERPL-MCNC: 13.6 MG/DL (ref 3.4–7)
URN SPEC COLLECT METH UR: ABNORMAL
WBC # BLD AUTO: 7.34 K/UL (ref 3.9–12.7)
WBC #/AREA URNS AUTO: 0 /HPF (ref 0–5)

## 2022-04-18 PROCEDURE — 81001 URINALYSIS AUTO W/SCOPE: CPT | Mod: NTX | Performed by: PHYSICIAN ASSISTANT

## 2022-04-18 PROCEDURE — 83735 ASSAY OF MAGNESIUM: CPT | Mod: 91,NTX | Performed by: PHYSICIAN ASSISTANT

## 2022-04-18 PROCEDURE — 84466 ASSAY OF TRANSFERRIN: CPT | Mod: NTX | Performed by: PHYSICIAN ASSISTANT

## 2022-04-18 PROCEDURE — 93010 EKG 12-LEAD: ICD-10-PCS | Mod: NTX,,, | Performed by: INTERNAL MEDICINE

## 2022-04-18 PROCEDURE — 80323 ALKALOIDS NOS: CPT | Mod: NTX | Performed by: PHYSICIAN ASSISTANT

## 2022-04-18 PROCEDURE — 99233 SBSQ HOSP IP/OBS HIGH 50: CPT | Mod: NTX,,, | Performed by: INTERNAL MEDICINE

## 2022-04-18 PROCEDURE — 83615 LACTATE (LD) (LDH) ENZYME: CPT | Mod: NTX | Performed by: PHYSICIAN ASSISTANT

## 2022-04-18 PROCEDURE — 94761 N-INVAS EAR/PLS OXIMETRY MLT: CPT | Mod: NTX

## 2022-04-18 PROCEDURE — 83735 ASSAY OF MAGNESIUM: CPT | Mod: NTX | Performed by: NURSE PRACTITIONER

## 2022-04-18 PROCEDURE — 93005 ELECTROCARDIOGRAM TRACING: CPT | Mod: NTX

## 2022-04-18 PROCEDURE — 82728 ASSAY OF FERRITIN: CPT | Mod: NTX | Performed by: PHYSICIAN ASSISTANT

## 2022-04-18 PROCEDURE — 85025 COMPLETE CBC W/AUTO DIFF WBC: CPT | Mod: NTX | Performed by: NURSE PRACTITIONER

## 2022-04-18 PROCEDURE — 83036 HEMOGLOBIN GLYCOSYLATED A1C: CPT | Mod: NTX | Performed by: PHYSICIAN ASSISTANT

## 2022-04-18 PROCEDURE — 99900035 HC TECH TIME PER 15 MIN (STAT): Mod: NTX

## 2022-04-18 PROCEDURE — 84100 ASSAY OF PHOSPHORUS: CPT | Mod: NTX | Performed by: PHYSICIAN ASSISTANT

## 2022-04-18 PROCEDURE — 82803 BLOOD GASES ANY COMBINATION: CPT | Mod: NTX

## 2022-04-18 PROCEDURE — 99233 PR SUBSEQUENT HOSPITAL CARE,LEVL III: ICD-10-PCS | Mod: NTX,,, | Performed by: INTERNAL MEDICINE

## 2022-04-18 PROCEDURE — 86803 HEPATITIS C AB TEST: CPT | Mod: NTX | Performed by: PHYSICIAN ASSISTANT

## 2022-04-18 PROCEDURE — 84550 ASSAY OF BLOOD/URIC ACID: CPT | Mod: NTX | Performed by: PHYSICIAN ASSISTANT

## 2022-04-18 PROCEDURE — 80061 LIPID PANEL: CPT | Mod: NTX | Performed by: PHYSICIAN ASSISTANT

## 2022-04-18 PROCEDURE — 25000003 PHARM REV CODE 250: Mod: NTX | Performed by: INTERNAL MEDICINE

## 2022-04-18 PROCEDURE — 25000003 PHARM REV CODE 250: Mod: NTX | Performed by: NURSE PRACTITIONER

## 2022-04-18 PROCEDURE — 83880 ASSAY OF NATRIURETIC PEPTIDE: CPT | Mod: NTX | Performed by: PHYSICIAN ASSISTANT

## 2022-04-18 PROCEDURE — 87340 HEPATITIS B SURFACE AG IA: CPT | Mod: NTX | Performed by: PHYSICIAN ASSISTANT

## 2022-04-18 PROCEDURE — 63600175 PHARM REV CODE 636 W HCPCS: Mod: NTX | Performed by: NURSE PRACTITIONER

## 2022-04-18 PROCEDURE — 84134 ASSAY OF PREALBUMIN: CPT | Mod: NTX | Performed by: PHYSICIAN ASSISTANT

## 2022-04-18 PROCEDURE — 80307 DRUG TEST PRSMV CHEM ANLYZR: CPT | Mod: NTX | Performed by: PHYSICIAN ASSISTANT

## 2022-04-18 PROCEDURE — 83540 ASSAY OF IRON: CPT | Mod: NTX | Performed by: PHYSICIAN ASSISTANT

## 2022-04-18 PROCEDURE — 20600001 HC STEP DOWN PRIVATE ROOM: Mod: NTX

## 2022-04-18 PROCEDURE — 82248 BILIRUBIN DIRECT: CPT | Mod: NTX | Performed by: PHYSICIAN ASSISTANT

## 2022-04-18 PROCEDURE — 86022 PLATELET ANTIBODIES: CPT | Mod: NTX | Performed by: PHYSICIAN ASSISTANT

## 2022-04-18 PROCEDURE — 87389 HIV-1 AG W/HIV-1&-2 AB AG IA: CPT | Mod: NTX | Performed by: PHYSICIAN ASSISTANT

## 2022-04-18 PROCEDURE — 63600175 PHARM REV CODE 636 W HCPCS: Mod: NTX | Performed by: STUDENT IN AN ORGANIZED HEALTH CARE EDUCATION/TRAINING PROGRAM

## 2022-04-18 PROCEDURE — 93010 ELECTROCARDIOGRAM REPORT: CPT | Mod: NTX,,, | Performed by: INTERNAL MEDICINE

## 2022-04-18 PROCEDURE — 80053 COMPREHEN METABOLIC PANEL: CPT | Mod: NTX | Performed by: NURSE PRACTITIONER

## 2022-04-18 RX ADMIN — MILRINONE LACTATE IN DEXTROSE 0.5 MCG/KG/MIN: 200 INJECTION, SOLUTION INTRAVENOUS at 09:04

## 2022-04-18 RX ADMIN — SACUBITRIL AND VALSARTAN 1 TABLET: 24; 26 TABLET, FILM COATED ORAL at 09:04

## 2022-04-18 RX ADMIN — HEPARIN SODIUM 5000 UNITS: 5000 INJECTION INTRAVENOUS; SUBCUTANEOUS at 08:04

## 2022-04-18 RX ADMIN — MILRINONE LACTATE IN DEXTROSE 0.5 MCG/KG/MIN: 200 INJECTION, SOLUTION INTRAVENOUS at 02:04

## 2022-04-18 RX ADMIN — SACUBITRIL AND VALSARTAN 1 TABLET: 24; 26 TABLET, FILM COATED ORAL at 08:04

## 2022-04-18 RX ADMIN — MILRINONE LACTATE IN DEXTROSE 0.5 MCG/KG/MIN: 200 INJECTION, SOLUTION INTRAVENOUS at 06:04

## 2022-04-18 RX ADMIN — FUROSEMIDE 80 MG: 80 TABLET ORAL at 09:04

## 2022-04-18 RX ADMIN — FERROUS SULFATE TAB 325 MG (65 MG ELEMENTAL FE) 1 EACH: 325 (65 FE) TAB at 09:04

## 2022-04-18 RX ADMIN — MIRTAZAPINE 15 MG: 15 TABLET, FILM COATED ORAL at 08:04

## 2022-04-18 RX ADMIN — HEPARIN SODIUM 5000 UNITS: 5000 INJECTION INTRAVENOUS; SUBCUTANEOUS at 02:04

## 2022-04-18 RX ADMIN — SPIRONOLACTONE 25 MG: 25 TABLET, FILM COATED ORAL at 09:04

## 2022-04-18 RX ADMIN — FUROSEMIDE 80 MG: 80 TABLET ORAL at 05:04

## 2022-04-18 RX ADMIN — ASPIRIN 81 MG CHEWABLE TABLET 81 MG: 81 TABLET CHEWABLE at 09:04

## 2022-04-18 RX ADMIN — INSULIN ASPART 2 UNITS: 100 INJECTION, SOLUTION INTRAVENOUS; SUBCUTANEOUS at 08:04

## 2022-04-18 RX ADMIN — BUSPIRONE HYDROCHLORIDE 7.5 MG: 5 TABLET ORAL at 08:04

## 2022-04-18 RX ADMIN — SUCRALFATE 1 G: 1 TABLET ORAL at 08:04

## 2022-04-18 RX ADMIN — BUSPIRONE HYDROCHLORIDE 7.5 MG: 5 TABLET ORAL at 09:04

## 2022-04-18 RX ADMIN — PANTOPRAZOLE SODIUM 40 MG: 40 TABLET, DELAYED RELEASE ORAL at 09:04

## 2022-04-18 NOTE — ASSESSMENT & PLAN NOTE
NIDCM , on home Milrinone, S/P ICD, h/o polysubstance abuse, tox screen 4/8 -ve, admitted with ADHF and for consideration for advanced options w/u:    -RHC on Milrinone 0.25 mcg on 4/8: RA 15, PA 59/35 (43), W 33 and CO/CI 3.27/1.47  -TTE on Milrinone 0.25 mcg on 4/8: LVEDD 7, LVEF 10%, grade 3 diastolic dysfunction, RV severely dilated, mod to severely depressed, mod-severe MR, mild TR, PAS > 50 and IVC 8  - Admit labs showed sCr 1.7 (baseline of ~ 1.6)  - Increased Milrinone to 0.375 mcg/kg/min on admit. Milrinone then increased to 0.5 mcg/kg/min overnight 2/2 drop in sVO2. sVO2 has since improved to 57 with CO/CI 5.05/2.23 and SVR 1124  - on PO Lasix 80 bid currently   - Serial labs, keep K+ > 4.0 and Mg+ > 2.0  - GDMT; Hold Toprol given decompensation. Continue Entresto and Aldactone  - Step 3 of VAD only pathway despite favorable blood type (A) (still smokes)

## 2022-04-18 NOTE — CARE UPDATE
RAPID RESPONSE NURSE ROUND       Rounding completed with charge RN, Marissa. No concerns verbalized at this time. Instructed to call [phone] for further concerns or assistance.

## 2022-04-18 NOTE — PROGRESS NOTES
[facility] - Cardiology Stepdown  Heart Transplant  Progress Note    Patient Name: Kevan Queen  MRN: [id number]  Admission Date: 4/12/2022  Hospital Length of Stay: 6 days  Attending Physician: Luca Lopez Jr.,*  Primary Care Provider: ORALIA Cline  Principal Problem:Acute on chronic combined systolic and diastolic heart failure, NYHA class 4    Subjective:     Interval History:   -doing well subjectively, no symptoms of congestion anymore  -advancing to step 3 of VAD pathway today  -JVD 10 cmH2O on exam, also has a PICC line with which CVP was 8 this morning by nursing, I'll repeat it in the afternoon, he is on lasix po 80 bid currently with entresto/aldactone  -RHC to be done tomorrow as planned by previous week's team    Continuous Infusions:   milrinone 20mg/100ml D5W (200mcg/ml) 0.5 mcg/kg/min (04/18/22 0604)     Scheduled Meds:   aspirin  81 mg Oral Daily    busPIRone  7.5 mg Oral Q12H    ferrous sulfate  1 tablet Oral Daily    furosemide  80 mg Oral BID    heparin (porcine)  5,000 Units Subcutaneous Q8H    mirtazapine  15 mg Oral Nightly    pantoprazole  40 mg Oral Daily    sacubitriL-valsartan  1 tablet Oral BID    spironolactone  25 mg Oral Daily    sucralfate  1 g Oral Nightly     PRN Meds:acetaminophen, dextrose 10%, dextrose 10%, glucagon (human recombinant), glucose, glucose, insulin aspart U-100, sodium chloride 0.9%    Review of patient's allergies indicates:   Allergen Reactions    Bumex [bumetanide] Hives    Lactose Diarrhea    Torsemide Hives     Objective:     Vital Signs (Most Recent):  Temp: 97.5 °F (36.4 °C) (04/18/22 0742)  Pulse: (!) 112 (04/18/22 0742)  Resp: 18 (04/18/22 0742)  BP: (!) 99/55 (04/18/22 0742)  SpO2: 96 % (04/18/22 0742)   Vital Signs (24h Range):  Temp:  [97.5 °F (36.4 °C)-98.2 °F (36.8 °C)] 97.5 °F (36.4 °C)  Pulse:  [103-119] 112  Resp:  [18] 18  SpO2:  [93 %-99 %] 96 %  BP: ()/(52-66) 99/55     Patient Vitals for the past 72 hrs (Last 3  readings):   Weight   04/18/22 0742 93.9 kg (207 lb 0.2 oz)   04/18/22 0709 94 kg (207 lb 3.7 oz)   04/17/22 0926 93.6 kg (206 lb 5.6 oz)     Body mass index is 25.87 kg/m².      Intake/Output Summary (Last 24 hours) at 4/18/2022 1054  Last data filed at 4/18/2022 1000  Gross per 24 hour   Intake 760 ml   Output 3000 ml   Net -2240 ml       Hemodynamic Parameters:  CVP:  [8 mmHg] 8 mmHg    Telemetry: rev'd    Physical Exam  Constitutional: No distress, obese, conversant  HEENT: Sclera anicteric, PERRLA, EOMI  Neck: JVP at the clavicle, no masses, good movement  CV: RRR, S1 and S2 normal, no additional heart sounds or murmurs. Pulses 2+ and equal bilaterally in radial arteries, Johnathan's normal on right. Distal pulses are 2+ and equal in the femoral, DP and PT areas bilaterally  Pulm: Clear to auscultation bilaterally with symmetrical expansion. Chest wall palpated for reproduction of pain symptoms, and no pain was able to be produced on palpation or resistance exercises  GI: Abdomen soft, non-tender, good bowel sounds  Extremities: Both extremities intact and grossly normal, skin is warm, PICC in right UE   Psych: AOx3, appropriate affect  Neuro: CNII-XII intact, no focal deficits  Significant Labs:  CBC:  Recent Labs   Lab 04/16/22  0536 04/17/22  0450 04/18/22  0407   WBC 8.51 6.94 7.34   RBC 4.63 4.64 4.31*   HGB 14.5 14.2 13.5*   HCT 44.3 43.4 41.1    266 224   MCV 96 94 95   MCH 31.3* 30.6 31.3*   MCHC 32.7 32.7 32.8     BNP:  Recent Labs   Lab 04/12/22  1304   *     CMP:  Recent Labs   Lab 04/16/22  0441 04/17/22  0450 04/18/22  0407   *  172* 151*  151* 157*  157*   CALCIUM 10.2  10.2 10.2  10.2 10.1  10.1   ALBUMIN 4.1 4.0 3.8   PROT 8.1 7.6 7.3     138 134*  134* 136  136   K 4.0  4.0 3.9  3.9 4.1  4.1   CO2 26  26 28  28 27  27   CL 97  97 94*  94* 98  98   BUN 29*  29* 31*  31* 33*  33*   CREATININE 1.6*  1.6* 1.7*  1.7* 1.6*  1.6*   ALKPHOS 86 82 80   ALT  28 27 30   AST 30 33 31   BILITOT 0.8 1.0 0.7      Coagulation:   Recent Labs   Lab 04/12/22  1304   INR 1.1     LDH:  No results for input(s): LDH in the last 72 hours.  Microbiology:  Microbiology Results (last 7 days)       ** No results found for the last 168 hours. **            I have reviewed all pertinent labs within the past 24 hours.    Estimated Creatinine Clearance: 75.6 mL/min (A) (based on SCr of 1.6 mg/dL (H)).    Diagnostic Results:  I have reviewed all pertinent imaging results/findings within the past 24 hours.    Assessment and Plan:     38 yo BM with NICM, on home milrinone, hx of substance abuse who comes in for f/u. States that he has been doing well since last visit with Dr Lopez.Sleeps on 2 pillows. No PND anymore. Volume/weight has been stable. Great appetite. He did have to have his PICC line replaced as it got pulled out during sleep. He got his ICD placed last month in [place].      He claims being clean of any drugs, alcoho or tobacco for a long time. He came with his fiance that he has been living with for the last 8 years. They have 2 children in common. He has a total of 9 kids. Tox screen done 2/4/22 was -ve, but nicotine/cotinine screen was +ve same day. He admits to occasional cigarette smoking.      Underwent risk assessment with an echo and RHC on 4/8, both done on Milrinone 0.25 mcg/kg/min:    TTE: LVEDD 7, L:VEF 10%, grade 3 diastolic dysfunction, RV severely enlarge and mod to severely depressed, mod to severe MR, mild TR, PAS > 50 and IVC 8    RHC: RA 15, PAP 59/35 (43), W 33 and CO/CI 3.27/1.47.    Current GDMT: Toprol 25 mg qd, Entresto 24-26 bid, Aldactone 12.5 mg qd. Takes Lasix 80 mg bid plus Metolazone 2.5 mg qod    He presents as a direct admit with AHD and for advanced options consideration            * Acute on chronic combined systolic and diastolic heart failure, NYHA class 4  NIDCM , on home Milrinone, S/P ICD, h/o polysubstance abuse, tox screen 4/8 -ve,  admitted with ADHF and for consideration for advanced options w/u:    -RHC on Milrinone 0.25 mcg on 4/8: RA 15, PA 59/35 (43), W 33 and CO/CI 3.27/1.47  -TTE on Milrinone 0.25 mcg on 4/8: LVEDD 7, LVEF 10%, grade 3 diastolic dysfunction, RV severely dilated, mod to severely depressed, mod-severe MR, mild TR, PAS > 50 and IVC 8  - Admit labs showed sCr 1.7 (baseline of ~ 1.6)  - Increased Milrinone to 0.375 mcg/kg/min on admit. Milrinone then increased to 0.5 mcg/kg/min overnight 2/2 drop in sVO2. sVO2 has since improved to 57 with CO/CI 5.05/2.23 and SVR 1124  - on PO Lasix 80 bid currently   - Serial labs, keep K+ > 4.0 and Mg+ > 2.0  - GDMT; Hold Toprol given decompensation. Continue Entresto and Aldactone  - Step 3 of VAD only pathway despite favorable blood type (A) (still smokes)    ROC (acute kidney injury)  Cr 1.6 currently, baseline around 1.4  Continue volume management per hemodynamcis        Jan Pruitt MD  Heart Transplant  [facility] - Cardiology Stepdown

## 2022-04-18 NOTE — SUBJECTIVE & OBJECTIVE
Interval History:   -doing well subjectively, no symptoms of congestion anymore  -advancing to step 3 of VAD pathway today  -JVD 10 cmH2O on exam, also has a PICC line with which CVP was 8 this morning by nursing, I'll repeat it in the afternoon, he is on lasix po 80 bid currently with entresto/aldactone  -RHC to be done tomorrow as planned by previous week's team    Continuous Infusions:   milrinone 20mg/100ml D5W (200mcg/ml) 0.5 mcg/kg/min (04/18/22 0604)     Scheduled Meds:   aspirin  81 mg Oral Daily    busPIRone  7.5 mg Oral Q12H    ferrous sulfate  1 tablet Oral Daily    furosemide  80 mg Oral BID    heparin (porcine)  5,000 Units Subcutaneous Q8H    mirtazapine  15 mg Oral Nightly    pantoprazole  40 mg Oral Daily    sacubitriL-valsartan  1 tablet Oral BID    spironolactone  25 mg Oral Daily    sucralfate  1 g Oral Nightly     PRN Meds:acetaminophen, dextrose 10%, dextrose 10%, glucagon (human recombinant), glucose, glucose, insulin aspart U-100, sodium chloride 0.9%    Review of patient's allergies indicates:   Allergen Reactions    Bumex [bumetanide] Hives    Lactose Diarrhea    Torsemide Hives     Objective:     Vital Signs (Most Recent):  Temp: 97.5 °F (36.4 °C) (04/18/22 0742)  Pulse: (!) 112 (04/18/22 0742)  Resp: 18 (04/18/22 0742)  BP: (!) 99/55 (04/18/22 0742)  SpO2: 96 % (04/18/22 0742)   Vital Signs (24h Range):  Temp:  [97.5 °F (36.4 °C)-98.2 °F (36.8 °C)] 97.5 °F (36.4 °C)  Pulse:  [103-119] 112  Resp:  [18] 18  SpO2:  [93 %-99 %] 96 %  BP: ()/(52-66) 99/55     Patient Vitals for the past 72 hrs (Last 3 readings):   Weight   04/18/22 0742 93.9 kg (207 lb 0.2 oz)   04/18/22 0709 94 kg (207 lb 3.7 oz)   04/17/22 0926 93.6 kg (206 lb 5.6 oz)     Body mass index is 25.87 kg/m².      Intake/Output Summary (Last 24 hours) at 4/18/2022 1054  Last data filed at 4/18/2022 1000  Gross per 24 hour   Intake 760 ml   Output 3000 ml   Net -2240 ml       Hemodynamic Parameters:  CVP:  [8 mmHg] 8  mmHg    Telemetry: rev'd    Physical Exam  Constitutional: No distress, obese, conversant  HEENT: Sclera anicteric, PERRLA, EOMI  Neck: JVP at the clavicle, no masses, good movement  CV: RRR, S1 and S2 normal, no additional heart sounds or murmurs. Pulses 2+ and equal bilaterally in radial arteries, Johnathan's normal on right. Distal pulses are 2+ and equal in the femoral, DP and PT areas bilaterally  Pulm: Clear to auscultation bilaterally with symmetrical expansion. Chest wall palpated for reproduction of pain symptoms, and no pain was able to be produced on palpation or resistance exercises  GI: Abdomen soft, non-tender, good bowel sounds  Extremities: Both extremities intact and grossly normal, skin is warm, PICC in right UE   Psych: AOx3, appropriate affect  Neuro: CNII-XII intact, no focal deficits  Significant Labs:  CBC:  Recent Labs   Lab 04/16/22  0536 04/17/22  0450 04/18/22  0407   WBC 8.51 6.94 7.34   RBC 4.63 4.64 4.31*   HGB 14.5 14.2 13.5*   HCT 44.3 43.4 41.1    266 224   MCV 96 94 95   MCH 31.3* 30.6 31.3*   MCHC 32.7 32.7 32.8     BNP:  Recent Labs   Lab 04/12/22  1304   *     CMP:  Recent Labs   Lab 04/16/22  0441 04/17/22  0450 04/18/22  0407   *  172* 151*  151* 157*  157*   CALCIUM 10.2  10.2 10.2  10.2 10.1  10.1   ALBUMIN 4.1 4.0 3.8   PROT 8.1 7.6 7.3     138 134*  134* 136  136   K 4.0  4.0 3.9  3.9 4.1  4.1   CO2 26  26 28  28 27  27   CL 97  97 94*  94* 98  98   BUN 29*  29* 31*  31* 33*  33*   CREATININE 1.6*  1.6* 1.7*  1.7* 1.6*  1.6*   ALKPHOS 86 82 80   ALT 28 27 30   AST 30 33 31   BILITOT 0.8 1.0 0.7      Coagulation:   Recent Labs   Lab 04/12/22  1304   INR 1.1     LDH:  No results for input(s): LDH in the last 72 hours.  Microbiology:  Microbiology Results (last 7 days)       ** No results found for the last 168 hours. **            I have reviewed all pertinent labs within the past 24 hours.    Estimated Creatinine Clearance: 75.6  mL/min (A) (based on SCr of 1.6 mg/dL (H)).    Diagnostic Results:  I have reviewed all pertinent imaging results/findings within the past 24 hours.

## 2022-04-18 NOTE — CONSULTS
RD consulted for heart transplant/LVAD. Spoke w/ pt at bedside, reports a good appetite currently and PTA. Per RN documentation pt is consuming 100% meal consumption. Low sodium/cardiac education provided 4/18/22 pt v/u understanding. Handouts provided. No issues chewing/swallowing at this time. No n/v/d/c. NFPE completed 4/18/22 no s/s of malnutrition at this time.

## 2022-04-18 NOTE — NURSING
"Plan of care reviewed with patient, no acute changes throughout the night. No complaint of pain. Denies N.V.D. Milirone gtt infusing at 14ml. Sinus tach on monitor.  Vitally stable.    will continue to monitor.   Blood pressure (!) 103/53, pulse 104, temperature 97.8 °F (36.6 °C), temperature source Oral, resp. rate 18, height 6' 3" (1.905 m), weight 93.6 kg (206 lb 5.6 oz), SpO2 (!) 93 %.      "

## 2022-04-19 ENCOUNTER — SOCIAL WORK (OUTPATIENT)
Dept: TRANSPLANT | Facility: CLINIC | Age: 37
End: 2022-04-19
Payer: MEDICAID

## 2022-04-19 LAB
ALBUMIN SERPL BCP-MCNC: 4.1 G/DL (ref 3.5–5.2)
ALLENS TEST: ABNORMAL
ALP SERPL-CCNC: 85 U/L (ref 55–135)
ALT SERPL W/O P-5'-P-CCNC: 37 U/L (ref 10–44)
ANION GAP SERPL CALC-SCNC: 15 MMOL/L (ref 8–16)
ANION GAP SERPL CALC-SCNC: 15 MMOL/L (ref 8–16)
APTT BLDCRRT: 27.6 SEC (ref 21–32)
ASCENDING AORTA: 2.62 CM
AST SERPL-CCNC: 38 U/L (ref 10–40)
AV INDEX (PROSTH): 0.52
AV MEAN GRADIENT: 2 MMHG
AV PEAK GRADIENT: 4 MMHG
AV VALVE AREA: 1.68 CM2
AV VELOCITY RATIO: 0.57
BASOPHILS # BLD AUTO: 0.03 K/UL (ref 0–0.2)
BASOPHILS NFR BLD: 0.4 % (ref 0–1.9)
BILIRUB SERPL-MCNC: 0.6 MG/DL (ref 0.1–1)
BSA FOR ECHO PROCEDURE: 2.23 M2
BUN SERPL-MCNC: 31 MG/DL (ref 6–20)
BUN SERPL-MCNC: 31 MG/DL (ref 6–20)
CALCIUM SERPL-MCNC: 10 MG/DL (ref 8.7–10.5)
CALCIUM SERPL-MCNC: 10 MG/DL (ref 8.7–10.5)
CHLORIDE SERPL-SCNC: 95 MMOL/L (ref 95–110)
CHLORIDE SERPL-SCNC: 95 MMOL/L (ref 95–110)
CLOSURE TME COLL+ADP BLD: >300 SECS (ref 59–120)
CO2 SERPL-SCNC: 24 MMOL/L (ref 23–29)
CO2 SERPL-SCNC: 24 MMOL/L (ref 23–29)
CREAT SERPL-MCNC: 1.8 MG/DL (ref 0.5–1.4)
CREAT SERPL-MCNC: 1.8 MG/DL (ref 0.5–1.4)
CRP SERPL-MCNC: 1.08 MG/L (ref 0–3.19)
CV ECHO LV RWT: 0.18 CM
DELSYS: ABNORMAL
DIFFERENTIAL METHOD: ABNORMAL
DOP CALC AO PEAK VEL: 1.05 M/S
DOP CALC AO VTI: 17.01 CM
DOP CALC LVOT AREA: 3.2 CM2
DOP CALC LVOT DIAMETER: 2.03 CM
DOP CALC LVOT PEAK VEL: 0.6 M/S
DOP CALC LVOT STROKE VOLUME: 28.53 CM3
DOP CALCLVOT PEAK VEL VTI: 8.82 CM
E/E' RATIO: 13.75 M/S
ECHO LV POSTERIOR WALL: 0.67 CM (ref 0.6–1.1)
EJECTION FRACTION: 15 %
EOSINOPHIL # BLD AUTO: 0.2 K/UL (ref 0–0.5)
EOSINOPHIL NFR BLD: 3.5 % (ref 0–8)
ERYTHROCYTE [DISTWIDTH] IN BLOOD BY AUTOMATED COUNT: 15.1 % (ref 11.5–14.5)
EST. GFR  (AFRICAN AMERICAN): 54.4 ML/MIN/1.73 M^2
EST. GFR  (AFRICAN AMERICAN): 54.4 ML/MIN/1.73 M^2
EST. GFR  (NON AFRICAN AMERICAN): 47 ML/MIN/1.73 M^2
EST. GFR  (NON AFRICAN AMERICAN): 47 ML/MIN/1.73 M^2
FACT VIII ACT/NOR PPP: 233 % (ref 60–170)
FIBRINOGEN PPP-MCNC: 253 MG/DL (ref 182–400)
FIO2: 21
FRACTIONAL SHORTENING: 8 % (ref 28–44)
GLUCOSE SERPL-MCNC: 237 MG/DL (ref 70–110)
GLUCOSE SERPL-MCNC: 237 MG/DL (ref 70–110)
HBV SURFACE AG SERPL QL IA: NEGATIVE
HCO3 UR-SCNC: 29.9 MMOL/L (ref 24–28)
HCT VFR BLD AUTO: 41.1 % (ref 40–54)
HCV AB SERPL QL IA: NEGATIVE
HGB BLD-MCNC: 13.9 G/DL (ref 14–18)
HIV 1+2 AB+HIV1 P24 AG SERPL QL IA: NEGATIVE
IMM GRANULOCYTES # BLD AUTO: 0.01 K/UL (ref 0–0.04)
IMM GRANULOCYTES NFR BLD AUTO: 0.1 % (ref 0–0.5)
INR PPP: 1.1 (ref 0.8–1.2)
INTERVENTRICULAR SEPTUM: 0.61 CM (ref 0.6–1.1)
LA MAJOR: 6.05 CM
LA MINOR: 5.03 CM
LA WIDTH: 4.8 CM
LEFT ATRIUM SIZE: 4.07 CM
LEFT ATRIUM VOLUME INDEX MOD: 37.8 ML/M2
LEFT ATRIUM VOLUME INDEX: 40.9 ML/M2
LEFT ATRIUM VOLUME MOD: 84.34 CM3
LEFT ATRIUM VOLUME: 91.22 CM3
LEFT INTERNAL DIMENSION IN SYSTOLE: 6.74 CM (ref 2.1–4)
LEFT VENTRICLE DIASTOLIC VOLUME INDEX: 125.92 ML/M2
LEFT VENTRICLE DIASTOLIC VOLUME: 280.81 ML
LEFT VENTRICLE MASS INDEX: 91 G/M2
LEFT VENTRICLE SYSTOLIC VOLUME INDEX: 105.3 ML/M2
LEFT VENTRICLE SYSTOLIC VOLUME: 234.79 ML
LEFT VENTRICULAR INTERNAL DIMENSION IN DIASTOLE: 7.3 CM (ref 3.5–6)
LEFT VENTRICULAR MASS: 202.45 G
LV LATERAL E/E' RATIO: 11 M/S
LV SEPTAL E/E' RATIO: 18.33 M/S
LYMPHOCYTES # BLD AUTO: 1.9 K/UL (ref 1–4.8)
LYMPHOCYTES NFR BLD: 27.5 % (ref 18–48)
MAGNESIUM SERPL-MCNC: 2.1 MG/DL (ref 1.6–2.6)
MCH RBC QN AUTO: 31.7 PG (ref 27–31)
MCHC RBC AUTO-ENTMCNC: 33.8 G/DL (ref 32–36)
MCV RBC AUTO: 94 FL (ref 82–98)
MODE: ABNORMAL
MONOCYTES # BLD AUTO: 0.7 K/UL (ref 0.3–1)
MONOCYTES NFR BLD: 9.4 % (ref 4–15)
MV PEAK E VEL: 1.1 M/S
NEUTROPHILS # BLD AUTO: 4.1 K/UL (ref 1.8–7.7)
NEUTROPHILS NFR BLD: 59.1 % (ref 38–73)
NRBC BLD-RTO: 0 /100 WBC
PCO2 BLDA: 54.8 MMHG (ref 35–45)
PH SMN: 7.34 [PH] (ref 7.35–7.45)
PISA MRMAX VEL: 0.04 M/S
PISA TR MAX VEL: 2.8 M/S
PLATELET # BLD AUTO: 248 K/UL (ref 150–450)
PLATELET FUNCTION ASSAY - EPINEPHRINE: >300 SECS (ref 76–199)
PMV BLD AUTO: 9.7 FL (ref 9.2–12.9)
PO2 BLDA: 28 MMHG (ref 40–60)
POC BE: 4 MMOL/L
POC SATURATED O2: 47 % (ref 95–100)
POC TCO2: 32 MMOL/L (ref 24–29)
POCT GLUCOSE: 149 MG/DL (ref 70–110)
POCT GLUCOSE: 191 MG/DL (ref 70–110)
POCT GLUCOSE: 235 MG/DL (ref 70–110)
POTASSIUM SERPL-SCNC: 4 MMOL/L (ref 3.5–5.1)
POTASSIUM SERPL-SCNC: 4 MMOL/L (ref 3.5–5.1)
PROT SERPL-MCNC: 7.8 G/DL (ref 6–8.4)
PROTHROMBIN TIME: 10.9 SEC (ref 9–12.5)
RA MAJOR: 6.04 CM
RA PRESSURE: 3 MMHG
RA WIDTH: 2.63 CM
RBC # BLD AUTO: 4.39 M/UL (ref 4.6–6.2)
RIGHT VENTRICULAR END-DIASTOLIC DIMENSION: 5.6 CM
RV TISSUE DOPPLER FREE WALL SYSTOLIC VELOCITY 1 (APICAL 4 CHAMBER VIEW): 8.43 CM/S
SAMPLE: ABNORMAL
SINUS: 3.01 CM
SITE: ABNORMAL
SODIUM SERPL-SCNC: 134 MMOL/L (ref 136–145)
SODIUM SERPL-SCNC: 134 MMOL/L (ref 136–145)
STJ: 2.15 CM
TDI LATERAL: 0.1 M/S
TDI SEPTAL: 0.06 M/S
TDI: 0.08 M/S
TR MAX PG: 31 MMHG
TRICUSPID ANNULAR PLANE SYSTOLIC EXCURSION: 2.22 CM
TSH SERPL DL<=0.005 MIU/L-ACNC: 2.08 UIU/ML (ref 0.4–4)
TV REST PULMONARY ARTERY PRESSURE: 34 MMHG
WBC # BLD AUTO: 6.92 K/UL (ref 3.9–12.7)

## 2022-04-19 PROCEDURE — 86141 C-REACTIVE PROTEIN HS: CPT | Mod: NTX | Performed by: INTERNAL MEDICINE

## 2022-04-19 PROCEDURE — 84443 ASSAY THYROID STIM HORMONE: CPT | Mod: NTX | Performed by: INTERNAL MEDICINE

## 2022-04-19 PROCEDURE — C1894 INTRO/SHEATH, NON-LASER: HCPCS | Mod: NTX | Performed by: INTERNAL MEDICINE

## 2022-04-19 PROCEDURE — 81240 F2 GENE: CPT | Mod: NTX | Performed by: PHYSICIAN ASSISTANT

## 2022-04-19 PROCEDURE — 63600175 PHARM REV CODE 636 W HCPCS: Mod: NTX | Performed by: STUDENT IN AN ORGANIZED HEALTH CARE EDUCATION/TRAINING PROGRAM

## 2022-04-19 PROCEDURE — 82803 BLOOD GASES ANY COMBINATION: CPT | Mod: NTX

## 2022-04-19 PROCEDURE — 85246 CLOT FACTOR VIII VW ANTIGEN: CPT | Mod: NTX | Performed by: PHYSICIAN ASSISTANT

## 2022-04-19 PROCEDURE — 25500020 PHARM REV CODE 255: Mod: NTX | Performed by: INTERNAL MEDICINE

## 2022-04-19 PROCEDURE — 99233 SBSQ HOSP IP/OBS HIGH 50: CPT | Mod: 25,NTX,, | Performed by: INTERNAL MEDICINE

## 2022-04-19 PROCEDURE — 85301 ANTITHROMBIN III ANTIGEN: CPT | Mod: 91,NTX | Performed by: PHYSICIAN ASSISTANT

## 2022-04-19 PROCEDURE — 99900035 HC TECH TIME PER 15 MIN (STAT): Mod: NTX

## 2022-04-19 PROCEDURE — 36415 COLL VENOUS BLD VENIPUNCTURE: CPT | Mod: NTX | Performed by: INTERNAL MEDICINE

## 2022-04-19 PROCEDURE — 25000003 PHARM REV CODE 250: Mod: NTX | Performed by: NURSE PRACTITIONER

## 2022-04-19 PROCEDURE — 85302 CLOT INHIBIT PROT C ANTIGEN: CPT | Mod: 91,NTX | Performed by: PHYSICIAN ASSISTANT

## 2022-04-19 PROCEDURE — 85240 CLOT FACTOR VIII AHG 1 STAGE: CPT | Mod: NTX | Performed by: INTERNAL MEDICINE

## 2022-04-19 PROCEDURE — 85305 CLOT INHIBIT PROT S TOTAL: CPT | Mod: NTX | Performed by: INTERNAL MEDICINE

## 2022-04-19 PROCEDURE — 93451 RIGHT HEART CATH: CPT | Mod: 26,NTX,, | Performed by: INTERNAL MEDICINE

## 2022-04-19 PROCEDURE — 85306 CLOT INHIBIT PROT S FREE: CPT | Mod: NTX | Performed by: INTERNAL MEDICINE

## 2022-04-19 PROCEDURE — 85247 CLOT FACTOR VIII MULTIMETRIC: CPT | Mod: NTX | Performed by: PHYSICIAN ASSISTANT

## 2022-04-19 PROCEDURE — 85301 ANTITHROMBIN III ANTIGEN: CPT | Mod: NTX | Performed by: INTERNAL MEDICINE

## 2022-04-19 PROCEDURE — C1751 CATH, INF, PER/CENT/MIDLINE: HCPCS | Mod: NTX | Performed by: INTERNAL MEDICINE

## 2022-04-19 PROCEDURE — 85576 BLOOD PLATELET AGGREGATION: CPT | Mod: 91,NTX | Performed by: INTERNAL MEDICINE

## 2022-04-19 PROCEDURE — 99233 PR SUBSEQUENT HOSPITAL CARE,LEVL III: ICD-10-PCS | Mod: 25,NTX,, | Performed by: INTERNAL MEDICINE

## 2022-04-19 PROCEDURE — 93451 RIGHT HEART CATH: CPT | Mod: NTX | Performed by: INTERNAL MEDICINE

## 2022-04-19 PROCEDURE — 85302 CLOT INHIBIT PROT C ANTIGEN: CPT | Mod: NTX | Performed by: INTERNAL MEDICINE

## 2022-04-19 PROCEDURE — 85730 THROMBOPLASTIN TIME PARTIAL: CPT | Mod: 91,NTX | Performed by: INTERNAL MEDICINE

## 2022-04-19 PROCEDURE — 82955 ASSAY OF G6PD ENZYME: CPT | Mod: 91,NTX | Performed by: PHYSICIAN ASSISTANT

## 2022-04-19 PROCEDURE — 85610 PROTHROMBIN TIME: CPT | Mod: NTX | Performed by: INTERNAL MEDICINE

## 2022-04-19 PROCEDURE — 85384 FIBRINOGEN ACTIVITY: CPT | Mod: NTX | Performed by: INTERNAL MEDICINE

## 2022-04-19 PROCEDURE — 25000003 PHARM REV CODE 250: Mod: NTX | Performed by: INTERNAL MEDICINE

## 2022-04-19 PROCEDURE — 81241 F5 GENE: CPT | Mod: NTX | Performed by: PHYSICIAN ASSISTANT

## 2022-04-19 PROCEDURE — 83090 ASSAY OF HOMOCYSTEINE: CPT | Mod: NTX | Performed by: INTERNAL MEDICINE

## 2022-04-19 PROCEDURE — 63600175 PHARM REV CODE 636 W HCPCS: Mod: NTX | Performed by: INTERNAL MEDICINE

## 2022-04-19 PROCEDURE — 82955 ASSAY OF G6PD ENZYME: CPT | Mod: NTX | Performed by: INTERNAL MEDICINE

## 2022-04-19 PROCEDURE — 85397 CLOTTING FUNCT ACTIVITY: CPT | Mod: NTX | Performed by: PHYSICIAN ASSISTANT

## 2022-04-19 PROCEDURE — 85025 COMPLETE CBC W/AUTO DIFF WBC: CPT | Mod: NTX | Performed by: INTERNAL MEDICINE

## 2022-04-19 PROCEDURE — 85610 PROTHROMBIN TIME: CPT | Mod: 91,NTX | Performed by: INTERNAL MEDICINE

## 2022-04-19 PROCEDURE — 20600001 HC STEP DOWN PRIVATE ROOM: Mod: NTX

## 2022-04-19 PROCEDURE — 86147 CARDIOLIPIN ANTIBODY EA IG: CPT | Mod: NTX | Performed by: PHYSICIAN ASSISTANT

## 2022-04-19 PROCEDURE — 85576 BLOOD PLATELET AGGREGATION: CPT | Mod: NTX | Performed by: INTERNAL MEDICINE

## 2022-04-19 PROCEDURE — 83735 ASSAY OF MAGNESIUM: CPT | Mod: NTX | Performed by: INTERNAL MEDICINE

## 2022-04-19 PROCEDURE — 63600175 PHARM REV CODE 636 W HCPCS: Mod: NTX | Performed by: NURSE PRACTITIONER

## 2022-04-19 PROCEDURE — 80053 COMPREHEN METABOLIC PANEL: CPT | Mod: NTX | Performed by: INTERNAL MEDICINE

## 2022-04-19 PROCEDURE — 93451 PR RIGHT HEART CATH O2 SATURATION & CARDIAC OUTPUT: ICD-10-PCS | Mod: 26,NTX,, | Performed by: INTERNAL MEDICINE

## 2022-04-19 PROCEDURE — 25000003 PHARM REV CODE 250: Mod: NTX | Performed by: STUDENT IN AN ORGANIZED HEALTH CARE EDUCATION/TRAINING PROGRAM

## 2022-04-19 RX ORDER — DIGOXIN 125 MCG
0.12 TABLET ORAL DAILY
Status: DISCONTINUED | OUTPATIENT
Start: 2022-04-20 | End: 2022-04-25 | Stop reason: HOSPADM

## 2022-04-19 RX ORDER — DIGOXIN 0.25 MG/ML
500 INJECTION INTRAMUSCULAR; INTRAVENOUS ONCE
Status: COMPLETED | OUTPATIENT
Start: 2022-04-19 | End: 2022-04-19

## 2022-04-19 RX ORDER — FUROSEMIDE 10 MG/ML
80 INJECTION INTRAMUSCULAR; INTRAVENOUS ONCE
Status: COMPLETED | OUTPATIENT
Start: 2022-04-19 | End: 2022-04-19

## 2022-04-19 RX ORDER — LIDOCAINE HYDROCHLORIDE AND EPINEPHRINE 10; 10 MG/ML; UG/ML
INJECTION, SOLUTION INFILTRATION; PERINEURAL
Status: DISCONTINUED | OUTPATIENT
Start: 2022-04-19 | End: 2022-04-25 | Stop reason: HOSPADM

## 2022-04-19 RX ADMIN — SPIRONOLACTONE 25 MG: 25 TABLET, FILM COATED ORAL at 09:04

## 2022-04-19 RX ADMIN — FERROUS SULFATE TAB 325 MG (65 MG ELEMENTAL FE) 1 EACH: 325 (65 FE) TAB at 09:04

## 2022-04-19 RX ADMIN — HEPARIN SODIUM 5000 UNITS: 5000 INJECTION INTRAVENOUS; SUBCUTANEOUS at 05:04

## 2022-04-19 RX ADMIN — BUSPIRONE HYDROCHLORIDE 7.5 MG: 5 TABLET ORAL at 09:04

## 2022-04-19 RX ADMIN — MILRINONE LACTATE IN DEXTROSE 0.5 MCG/KG/MIN: 200 INJECTION, SOLUTION INTRAVENOUS at 11:04

## 2022-04-19 RX ADMIN — MILRINONE LACTATE IN DEXTROSE 0.5 MCG/KG/MIN: 200 INJECTION, SOLUTION INTRAVENOUS at 04:04

## 2022-04-19 RX ADMIN — SACUBITRIL AND VALSARTAN 1 TABLET: 24; 26 TABLET, FILM COATED ORAL at 08:04

## 2022-04-19 RX ADMIN — MIRTAZAPINE 15 MG: 15 TABLET, FILM COATED ORAL at 08:04

## 2022-04-19 RX ADMIN — MILRINONE LACTATE IN DEXTROSE 0.5 MCG/KG/MIN: 200 INJECTION, SOLUTION INTRAVENOUS at 06:04

## 2022-04-19 RX ADMIN — HUMAN ALBUMIN MICROSPHERES AND PERFLUTREN 0.66 MG: 10; .22 INJECTION, SOLUTION INTRAVENOUS at 03:04

## 2022-04-19 RX ADMIN — INSULIN ASPART 2 UNITS: 100 INJECTION, SOLUTION INTRAVENOUS; SUBCUTANEOUS at 11:04

## 2022-04-19 RX ADMIN — FUROSEMIDE 20 MG/HR: 10 INJECTION, SOLUTION INTRAMUSCULAR; INTRAVENOUS at 10:04

## 2022-04-19 RX ADMIN — BUSPIRONE HYDROCHLORIDE 7.5 MG: 5 TABLET ORAL at 08:04

## 2022-04-19 RX ADMIN — FUROSEMIDE 80 MG: 80 TABLET ORAL at 09:04

## 2022-04-19 RX ADMIN — PANTOPRAZOLE SODIUM 40 MG: 40 TABLET, DELAYED RELEASE ORAL at 09:04

## 2022-04-19 RX ADMIN — FUROSEMIDE 80 MG: 10 INJECTION, SOLUTION INTRAMUSCULAR; INTRAVENOUS at 10:04

## 2022-04-19 RX ADMIN — SUCRALFATE 1 G: 1 TABLET ORAL at 08:04

## 2022-04-19 RX ADMIN — ACETAMINOPHEN 650 MG: 325 TABLET ORAL at 02:04

## 2022-04-19 RX ADMIN — DIGOXIN 500 MCG: 0.25 INJECTION INTRAMUSCULAR; INTRAVENOUS at 11:04

## 2022-04-19 RX ADMIN — HEPARIN SODIUM 5000 UNITS: 5000 INJECTION INTRAVENOUS; SUBCUTANEOUS at 02:04

## 2022-04-19 RX ADMIN — ASPIRIN 81 MG CHEWABLE TABLET 81 MG: 81 TABLET CHEWABLE at 09:04

## 2022-04-19 RX ADMIN — SACUBITRIL AND VALSARTAN 1 TABLET: 24; 26 TABLET, FILM COATED ORAL at 09:04

## 2022-04-19 NOTE — DISCHARGE SUMMARY
[facility] - Cath Lab  Discharge Note      Procedure(s) (LRB):  INSERTION, CATHETER, RIGHT HEART (Right)    OUTCOME: Patient tolerated treatment/procedure well without complication and is now ready for discharge.    DISPOSITION: Admitted as an Inpatient    FINAL DIAGNOSIS:  Acute on chronic combined systolic and diastolic heart failure, NYHA class 4    FOLLOWUP: With primary care provider    DISCHARGE INSTRUCTIONS:  No discharge procedures on file.     TIME SPENT ON DISCHARGE: 20 minutes

## 2022-04-19 NOTE — ASSESSMENT & PLAN NOTE
NIDCM , on home Milrinone, S/P ICD, h/o polysubstance abuse, tox screen 4/8 -ve, admitted with ADHF and for consideration for advanced options w/u:    -RHC on Milrinone 0.25 mcg on 4/8: RA 15, PA 59/35 (43), W 33 and CO/CI 3.27/1.47  -TTE on Milrinone 0.25 mcg on 4/8: LVEDD 7, LVEF 10%, grade 3 diastolic dysfunction, RV severely dilated, mod to severely depressed, mod-severe MR, mild TR, PAS > 50 and IVC 8  - Admit labs showed sCr 1.7 (baseline of ~ 1.6)  - Increased Milrinone to 0.375 mcg/kg/min on admit. Milrinone then increased to 0.5 mcg/kg/min overnight 2/2 drop in sVO2. sVO2 has since improved to 57 with CO/CI 5.05/2.23 and SVR 1124  - GDMT; Hold Toprol given decompensation. Continue Entresto and Aldactone  - Step 3 of VAD only pathway despite favorable blood type (A) (still smokes)    RHC 4/19 on 0.5 gtt milrinone   · RA 11 PA 50/27 (35) PCWP 27 CO 3.7 l/min CI 1.7 l/min/m2.  · PVR 2.2 CHRISTENSEN    Plan  -started IV diuresis on 4/19, keep milrinone 0.5, need to decrease filling pressures with diuresis to improve output  -K>4, Mg>2, tele

## 2022-04-19 NOTE — INTERVAL H&P NOTE
The patient has been examined and the H&P has been reviewed:    I concur with the findings and no changes have occurred since H&P was written.    Procedure risks, benefits and alternative options discussed and understood by patient/family.          Active Hospital Problems    Diagnosis  POA    *Acute on chronic combined systolic and diastolic heart failure, NYHA class 4 [I50.43]  Yes    Cardiogenic shock [R57.0]  Unknown    Tobacco use [Z72.0]  Unknown    Dilated cardiomyopathy [I42.0]  Yes     At this time not clear if familial but highly suspicious for this entity with father having had a heart transplant here; he was using ecstasy prior to this diagnosis        ICD (implantable cardioverter-defibrillator) in place [Z95.810]  Unknown    ROC (acute kidney injury) [N17.9]  Unknown      Resolved Hospital Problems   No resolved problems to display.

## 2022-04-19 NOTE — PLAN OF CARE
Plan of care reviewed with patient. Patient ambulating independently, VSS, no complaints of pain. Milrinone and Lasix drip infusing as ordered. Patient and family member educated on importance of strict intake/output record and following diet/fluid restriction closely. Right heart cath completed this AM and echo completed at bedside. Patient aware he will have meeting with the care team tomorrow to discuss next steps for LVAD. No questions at this time.

## 2022-04-19 NOTE — NURSING
Plan of care discussed with patient.  Patient remains free of falls and trauma. Patient ambulating independently. Patient voices no complaints of pain. Milrinone continues to infuse to MELISSA PICC at 46.5 mcg/min (14 ml).  Plan is for right heart cath tomorrow. Discussed medications and care. Patient has no questions at this time. Will continue to monitor.
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
## 2022-04-19 NOTE — SUBJECTIVE & OBJECTIVE
Interval History:   -RHC today on 0.5 gtt milrinone with severe elevated L sided filling pressures and low index, stopped oral lasix and started lasix gtt 20 after IVP 80  -Cr increased to 1.8 from 1.6 yesterday   -step 3 of pathway, to be presented tomorrow    RHC today   RA 11 PA 50/27 (35) PCWP 27 CO 3.7 l/min CI 1.7 l/min/m2.  PVR 2.2 CHRISTENSEN      Continuous Infusions:   furosemide (LASIX) 10 mg/mL infusion (non-titrating)      milrinone 20mg/100ml D5W (200mcg/ml) 0.5 mcg/kg/min (04/19/22 0428)     Scheduled Meds:   aspirin  81 mg Oral Daily    busPIRone  7.5 mg Oral Q12H    ferrous sulfate  1 tablet Oral Daily    furosemide (LASIX) injection  80 mg Intravenous Once    heparin (porcine)  5,000 Units Subcutaneous Q8H    mirtazapine  15 mg Oral Nightly    pantoprazole  40 mg Oral Daily    sacubitriL-valsartan  1 tablet Oral BID    spironolactone  25 mg Oral Daily    sucralfate  1 g Oral Nightly     PRN Meds:acetaminophen, dextrose 10%, dextrose 10%, glucagon (human recombinant), glucose, glucose, insulin aspart U-100, LIDOcaine-EPINEPHrine 1%-1:100,000, sodium chloride 0.9%    Review of patient's allergies indicates:   Allergen Reactions    Bumex [bumetanide] Hives    Lactose Diarrhea    Torsemide Hives     Objective:     Vital Signs (Most Recent):  Temp: 97.8 °F (36.6 °C) (04/19/22 0746)  Pulse: 104 (04/19/22 0908)  Resp: 14 (04/19/22 0908)  BP: 114/76 (04/19/22 0908)  SpO2: 99 % (04/19/22 0908) Vital Signs (24h Range):  Temp:  [97.7 °F (36.5 °C)-98.3 °F (36.8 °C)] 97.8 °F (36.6 °C)  Pulse:  [] 104  Resp:  [14-18] 14  SpO2:  [96 %-100 %] 99 %  BP: ()/(53-76) 114/76     Patient Vitals for the past 72 hrs (Last 3 readings):   Weight   04/18/22 0742 93.9 kg (207 lb 0.2 oz)   04/18/22 0709 94 kg (207 lb 3.7 oz)   04/17/22 0926 93.6 kg (206 lb 5.6 oz)     Body mass index is 25.87 kg/m².      Intake/Output Summary (Last 24 hours) at 4/19/2022 1013  Last data filed at 4/19/2022 0900  Gross per 24 hour   Intake  962 ml   Output 1900 ml   Net -938 ml       Hemodynamic Parameters:  CVP:  [8 mmHg] 8 mmHg    Telemetry: rev'd    Physical Exam  Constitutional: No distress, obese, conversant  HEENT: Sclera anicteric, PERRLA, EOMI  Neck: JVP at the clavicle, no masses, good movement  CV: RRR, S1 and S2 normal, no additional heart sounds or murmurs. Pulses 2+ and equal bilaterally in radial arteries, Johnathan's normal on right. Distal pulses are 2+ and equal in the femoral, DP and PT areas bilaterally  Pulm: Clear to auscultation bilaterally with symmetrical expansion. Chest wall palpated for reproduction of pain symptoms, and no pain was able to be produced on palpation or resistance exercises  GI: Abdomen soft, non-tender, good bowel sounds  Extremities: Both extremities intact and grossly normal, skin is warm, PICC in right UE   Psych: AOx3, appropriate affect  Neuro: CNII-XII intact, no focal deficits  Significant Labs:  CBC:  Recent Labs   Lab 04/17/22  0450 04/18/22  0407 04/19/22  0430   WBC 6.94 7.34 6.92   RBC 4.64 4.31* 4.39*   HGB 14.2 13.5* 13.9*   HCT 43.4 41.1 41.1    224 248   MCV 94 95 94   MCH 30.6 31.3* 31.7*   MCHC 32.7 32.8 33.8     BNP:  Recent Labs   Lab 04/12/22  1304 04/18/22  1403   * 145*     CMP:  Recent Labs   Lab 04/17/22  0450 04/18/22  0407 04/19/22  0430   *  151* 157*  157* 237*  237*   CALCIUM 10.2  10.2 10.1  10.1 10.0  10.0   ALBUMIN 4.0 3.8 4.1   PROT 7.6 7.3 7.8   *  134* 136  136 134*  134*   K 3.9  3.9 4.1  4.1 4.0  4.0   CO2 28  28 27  27 24  24   CL 94*  94* 98  98 95  95   BUN 31*  31* 33*  33* 31*  31*   CREATININE 1.7*  1.7* 1.6*  1.6* 1.8*  1.8*   ALKPHOS 82 80 85   ALT 27 30 37   AST 33 31 38   BILITOT 1.0 0.7 0.6      Coagulation:   Recent Labs   Lab 04/12/22  1304 04/19/22  0430   INR 1.1 1.1   APTT  --  27.6     LDH:  Recent Labs   Lab 04/18/22  1403        Microbiology:  Microbiology Results (last 7 days)       ** No results  found for the last 168 hours. **            I have reviewed all pertinent labs within the past 24 hours.    Estimated Creatinine Clearance: 67.2 mL/min (A) (based on SCr of 1.8 mg/dL (H)).    Diagnostic Results:  I have reviewed all pertinent imaging results/findings within the past 24 hours.

## 2022-04-19 NOTE — PROGRESS NOTES
Interdisciplinary Rounds Report:   Attended interdisciplinary rounds with the [facility]/CTS services including the LVAD Coordinators, social workers, cardiologists, surgeons,  PT/OT/Speech, dietician, and unit charge nurses. Discussed patient status, plan of care, goals of care, including DC date, and post discharge needs. Plan of care will be discussed with the patient and/or family per the physician while rounding on the floor. This is a recurring meeting that is medically and socially necessary to collaborate with the interdisciplinary team to assist patient needs and safe discharge.

## 2022-04-19 NOTE — PROGRESS NOTES
[facility] - Cardiology Stepdown  Heart Transplant  Progress Note    Patient Name: Kevan Queen  MRN: [id number]  Admission Date: 4/12/2022  Hospital Length of Stay: 7 days  Attending Physician: Luca Lopez Jr.,*  Primary Care Provider: ORALIA Cline  Principal Problem:Acute on chronic combined systolic and diastolic heart failure, NYHA class 4    Subjective:     Interval History:   -RHC today on 0.5 gtt milrinone with severe elevated L sided filling pressures and low index, stopped oral lasix and started lasix gtt 20 after IVP 80  -Cr increased to 1.8 from 1.6 yesterday   -step 3 of pathway, to be presented tomorrow    RHC today   · RA 11 PA 50/27 (35) PCWP 27 CO 3.7 l/min CI 1.7 l/min/m2.  · PVR 2.2 CHRISTENSEN      Continuous Infusions:   furosemide (LASIX) 10 mg/mL infusion (non-titrating)      milrinone 20mg/100ml D5W (200mcg/ml) 0.5 mcg/kg/min (04/19/22 0428)     Scheduled Meds:   aspirin  81 mg Oral Daily    busPIRone  7.5 mg Oral Q12H    ferrous sulfate  1 tablet Oral Daily    furosemide (LASIX) injection  80 mg Intravenous Once    heparin (porcine)  5,000 Units Subcutaneous Q8H    mirtazapine  15 mg Oral Nightly    pantoprazole  40 mg Oral Daily    sacubitriL-valsartan  1 tablet Oral BID    spironolactone  25 mg Oral Daily    sucralfate  1 g Oral Nightly     PRN Meds:acetaminophen, dextrose 10%, dextrose 10%, glucagon (human recombinant), glucose, glucose, insulin aspart U-100, LIDOcaine-EPINEPHrine 1%-1:100,000, sodium chloride 0.9%    Review of patient's allergies indicates:   Allergen Reactions    Bumex [bumetanide] Hives    Lactose Diarrhea    Torsemide Hives     Objective:     Vital Signs (Most Recent):  Temp: 97.8 °F (36.6 °C) (04/19/22 0746)  Pulse: 104 (04/19/22 0908)  Resp: 14 (04/19/22 0908)  BP: 114/76 (04/19/22 0908)  SpO2: 99 % (04/19/22 0908) Vital Signs (24h Range):  Temp:  [97.7 °F (36.5 °C)-98.3 °F (36.8 °C)] 97.8 °F (36.6 °C)  Pulse:  [] 104  Resp:  [14-18] 14  SpO2:   [96 %-100 %] 99 %  BP: ()/(53-76) 114/76     Patient Vitals for the past 72 hrs (Last 3 readings):   Weight   04/18/22 0742 93.9 kg (207 lb 0.2 oz)   04/18/22 0709 94 kg (207 lb 3.7 oz)   04/17/22 0926 93.6 kg (206 lb 5.6 oz)     Body mass index is 25.87 kg/m².      Intake/Output Summary (Last 24 hours) at 4/19/2022 1013  Last data filed at 4/19/2022 0900  Gross per 24 hour   Intake 962 ml   Output 1900 ml   Net -938 ml       Hemodynamic Parameters:  CVP:  [8 mmHg] 8 mmHg    Telemetry: rev'd    Physical Exam  Constitutional: No distress, obese, conversant  HEENT: Sclera anicteric, PERRLA, EOMI  Neck: JVP at the clavicle, no masses, good movement  CV: RRR, S1 and S2 normal, no additional heart sounds or murmurs. Pulses 2+ and equal bilaterally in radial arteries, Johnathan's normal on right. Distal pulses are 2+ and equal in the femoral, DP and PT areas bilaterally  Pulm: Clear to auscultation bilaterally with symmetrical expansion. Chest wall palpated for reproduction of pain symptoms, and no pain was able to be produced on palpation or resistance exercises  GI: Abdomen soft, non-tender, good bowel sounds  Extremities: Both extremities intact and grossly normal, skin is warm, PICC in right UE   Psych: AOx3, appropriate affect  Neuro: CNII-XII intact, no focal deficits  Significant Labs:  CBC:  Recent Labs   Lab 04/17/22  0450 04/18/22  0407 04/19/22  0430   WBC 6.94 7.34 6.92   RBC 4.64 4.31* 4.39*   HGB 14.2 13.5* 13.9*   HCT 43.4 41.1 41.1    224 248   MCV 94 95 94   MCH 30.6 31.3* 31.7*   MCHC 32.7 32.8 33.8     BNP:  Recent Labs   Lab 04/12/22  1304 04/18/22  1403   * 145*     CMP:  Recent Labs   Lab 04/17/22  0450 04/18/22  0407 04/19/22  0430   *  151* 157*  157* 237*  237*   CALCIUM 10.2  10.2 10.1  10.1 10.0  10.0   ALBUMIN 4.0 3.8 4.1   PROT 7.6 7.3 7.8   *  134* 136  136 134*  134*   K 3.9  3.9 4.1  4.1 4.0  4.0   CO2 28  28 27  27 24  24   CL 94*  94* 98  98  95  95   BUN 31*  31* 33*  33* 31*  31*   CREATININE 1.7*  1.7* 1.6*  1.6* 1.8*  1.8*   ALKPHOS 82 80 85   ALT 27 30 37   AST 33 31 38   BILITOT 1.0 0.7 0.6      Coagulation:   Recent Labs   Lab 04/12/22  1304 04/19/22  0430   INR 1.1 1.1   APTT  --  27.6     LDH:  Recent Labs   Lab 04/18/22  1403        Microbiology:  Microbiology Results (last 7 days)       ** No results found for the last 168 hours. **            I have reviewed all pertinent labs within the past 24 hours.    Estimated Creatinine Clearance: 67.2 mL/min (A) (based on SCr of 1.8 mg/dL (H)).    Diagnostic Results:  I have reviewed all pertinent imaging results/findings within the past 24 hours.    Assessment and Plan:     36 yo BM with NICM, on home milrinone, hx of substance abuse who comes in for f/u. States that he has been doing well since last visit with Dr Lopez.Sleeps on 2 pillows. No PND anymore. Volume/weight has been stable. Great appetite. He did have to have his PICC line replaced as it got pulled out during sleep. He got his ICD placed last month in [place].      He claims being clean of any drugs, alcoho or tobacco for a long time. He came with his fiance that he has been living with for the last 8 years. They have 2 children in common. He has a total of 9 kids. Tox screen done 2/4/22 was -ve, but nicotine/cotinine screen was +ve same day. He admits to occasional cigarette smoking.      Underwent risk assessment with an echo and RHC on 4/8, both done on Milrinone 0.25 mcg/kg/min:    TTE: LVEDD 7, L:VEF 10%, grade 3 diastolic dysfunction, RV severely enlarge and mod to severely depressed, mod to severe MR, mild TR, PAS > 50 and IVC 8    RHC: RA 15, PAP 59/35 (43), W 33 and CO/CI 3.27/1.47.    Current GDMT: Toprol 25 mg qd, Entresto 24-26 bid, Aldactone 12.5 mg qd. Takes Lasix 80 mg bid plus Metolazone 2.5 mg qod    He presents as a direct admit with AHD and for advanced options consideration            * Acute on  chronic combined systolic and diastolic heart failure, NYHA class 4  NIDCM , on home Milrinone, S/P ICD, h/o polysubstance abuse, tox screen 4/8 -ve, admitted with ADHF and for consideration for advanced options w/u:    -RHC on Milrinone 0.25 mcg on 4/8: RA 15, PA 59/35 (43), W 33 and CO/CI 3.27/1.47  -TTE on Milrinone 0.25 mcg on 4/8: LVEDD 7, LVEF 10%, grade 3 diastolic dysfunction, RV severely dilated, mod to severely depressed, mod-severe MR, mild TR, PAS > 50 and IVC 8  - Admit labs showed sCr 1.7 (baseline of ~ 1.6)  - Increased Milrinone to 0.375 mcg/kg/min on admit. Milrinone then increased to 0.5 mcg/kg/min overnight 2/2 drop in sVO2. sVO2 has since improved to 57 with CO/CI 5.05/2.23 and SVR 1124  - GDMT; Hold Toprol given decompensation. Continue Entresto and Aldactone  - Step 3 of VAD only pathway despite favorable blood type (A) (still smokes)    RHC 4/19 on 0.5 gtt milrinone   · RA 11 PA 50/27 (35) PCWP 27 CO 3.7 l/min CI 1.7 l/min/m2.  · PVR 2.2 CHRISTENSEN    Plan  -started IV diuresis on 4/19, keep milrinone 0.5, need to decrease filling pressures with diuresis to improve output  -K>4, Mg>2, tele          Tobacco use  - Continues to smoke occasionally. WIll re check nicotine/cotinine    ROC (acute kidney injury)  Cr 1.8 currently, baseline around 1.4  Continue volume management per hemodynamcis        Jan Pruitt MD  Heart Transplant  [facility] - Cardiology Stepdown

## 2022-04-19 NOTE — PROGRESS NOTES
Update    Pt and Pradeep Wright presents as AAO x4, calm, cooperative, and asking and answering questions appropriately. LCSW went over caregiver education for LVAD. LCSW answered additional questions. Psychosocial to follow. SW providing ongoing psychosocial, counseling, & emotional support, education, resources, assistance, and discharge planning as indicated.  SW to continue to follow.

## 2022-04-19 NOTE — PROGRESS NOTES
LEFT VENTRICULAR ASSIST DEVICE (LVAD) PSYCHOSOCIAL ASSESSMENT    Encounter Date: 4/19/2022  Kevan Queen  [address] [address]  Telephone Information:   Mobile [phone]   Mobile [phone]   Home  [phone] (home)  Work  There is no work phone number on file.  E-mail  [email or web address]    Sex: male  YOB: 1985  Age: 37 y.o.    [place] Citizen: yes  Primary Language: English   Needed: no    Emergency Contact:  Name: Niharika Wright   Relationship: significant other  Address: [address] [place] [address]  Phone Numbers:  [phone]    Family/Social Support:   Number of dependents/: Two daughters   Marital history: Engaged to Niharika, they have been together over 8 years.   Other family dynamics: Pt, Niharika, and young daughters lives with Pt's mother, Malou.     Household Composition:  Name: Malou Dumont   Relationship: mother  Does person drive? yes  [phone].    Name: Niharika Wright   Relationship: significant other, Pradeep   Does person drive? yes   [phone]      Do you and your caregivers have access to reliable transportation? yes     PRIMARY CAREGIVER: Niharika Wright, will be primary caregiver, phone number [phone].      provided in-depth information to patient and caregiver regarding pre- and post-transplant caregiver role.  SSocial worker strongly encourages patient and caregiver to have concrete plan regarding post-LVAD care, including back-up caregiver(s) to ensure care giving needs are met as needed.    Patient and Caregiver states understanding all aspects of caregiver role/commitment and is able/willing/committed to being caregiver to the fullest extent necessary.    Patient and Caregiver verbalizes understanding of the education provided today and caregiver responsibilities.         remains available. Patient and Caregiver agree to contact  in a timely manner if concerns arise.      Able to  take time off work without financial concerns: yes.     Additional Significant Others who will Assist with LVAD:  Name: Malou Dumont   City/State: [place], [place]   Relationship: mother  Does person drive? yes  Phone: [phone]      Living Will: no  Healthcare Power of : no, States his mother his is HPOA. LCSW provided education that his simeone Niharika would not be consulted on making medical decisions on Pt's behalf unless she was added to the HPOA. Pt will think on this and follow up with LCSW in the next 1-3 days.     Advance Directives on file: <<no information> per medical record.  Verbally reviewed LW/HCPA information.   provided patient with copy of LW/HCPA documents and provided education on completion of forms    Living Donors: No.    Highest Education Level: Attended College/Technical School, Pt is an CNA   Reading Ability: college  Reports difficulty with: N/A  Learns Best By:  [employer] ways of learning.      Status: no  VA Benefits: no     Working for Income: No  If no, reason not working: Demands of Treatment, Pt was working as a CNA and states due to weakness could not continue working.   Spouse/Significant Other Employment: Niharika is a homemaker at this time and also works in music.     Disabled: no, Pt states he applied twice and got denied.     Monthly Income:  Other: $7500.00 - Pt states his mother receives $7500 per month from his father's inheritance and pays all the families bills.    Able to afford all costs now and if receives LVAD, including medications: yes  Pt reports secure power source? yes  Pt reports ability to afford monthly electric bill? yes  Pt reports ability to afford LVAD dressing supplies? NA     Patient and Caregiver verbalizes understanding of personal responsibilities related to LVAD costs and the importance of having a financial plan to ensure that patients LVAD costs are fully covered.       provided fundraising information/education.  Patient and Caregiver verbalizes understanding.   remains available.    Insurance:   Payer/Plan Subscr  Sex Relation Sub. Ins. ID Effective Group Num   1. MEDICAID - HE* JOSE J DAMICO 1985 Male Self [id number] 3/1/20 [id number]                                   [address] [address]     Primary Insurance (for UNOS reporting): Public Insurance - Medicaid  Secondary Insurance (for UNOS reporting): None    Patient and Caregiver verbalizes clear understanding that patient may experience difficulty obtaining and/or be denied insurance coverage post-surgery. This includes and is not limited to disability insurance, life insurance, health insurance, burial insurance, long term care insurance, and other insurances.      Patient and Caregiver also reports understanding that future health concerns related to or unrelated to LVAD may not be covered by patient's insurance.  Resources and information provided and reviewed.      Patient and Caregiver provides verbal permission to release any necessary information to outside resources for patient care and discharge planning.  Resources and information provided are reviewed.      Infusion Service: patient utilizing? yes - [facility] for IV Milrinone with access to the infusion suite.   Home Health: patient utilizing? no  DME: no  Pulmonary/Cardiac Rehab: NA   ADLS:  Often feels weak when doing ADLs.     Adherence:   Pt reports a high level of adherence.  Adherence education and counseling provided.     Per History Section:  Past Medical History:   Diagnosis Date    Arthritis     Cardiomyopathy     CHF (congestive heart failure) 10/01/2020    Dilated cardiomyopathy 10/26/2020    Drug abuse 10/2020    Renal disorder      Social History     Tobacco Use    Smoking status: Former Smoker     Packs/day: 0.50     Years: 16.00     Pack years: 8.00     Start date: 10/1/2004     Quit date: 10/1/2020     Years since quittin.5    Smokeless tobacco: Never Used   Substance  Use Topics    Alcohol use: Not Currently     Social History     Substance and Sexual Activity   Drug Use Not Currently    Types: Marijuana, MDMA (Ecstacy)     Social History     Substance and Sexual Activity   Sexual Activity Yes    Partners: Female    Birth control/protection: None       Per Today's Psychosocial:  Tobacco: none, patient denies any use.  Alcohol: none, patient denies any use.  Illicit Drugs/Non-prescribed Medications: none, patient denies any use.  Pt states he stopped using Marijuana in 11/2021, Cocaine in 7/2021 and Ecstasy in 9/2020.     Patient and Caregiver states clear understanding of the potential impact of substance use as it relates to LVAD candidacy and is aware of possible random substance screening.  Substance abstinence/cessation counseling, education and resources provided and reviewed.     Arrests/DWI/Treatment/Rehab: patient denies - Pt reports that he has no pending legal issues at this time but reports being incarcerated three times with his longest stent being in long-term for 5 years.     Psychiatric History:    Mental Health: depression, anxiety and Anger -  LCSW consulted with Jan Pruitt MD to see if we can get a psych consult while Pt is in the hospital. Pt agreed to psych consult and states his anxiety has been out of control. Pt currently on Buspar 7.5mg once per day. Pt denies any current thoughts of SI/HI/AVH during today's meeting.   Psychiatrist/Counselor: Pt had intake appt scheduled at [facility] in [place] during time of hospitalization.   Medications:  Buspar 7.5mg   Suicide/Homicide Issues: Pt denies any SI/HI/AVH during visit today.   Safety at home: Pt states he feels safe at home.     Knowledge: Patient and Caregiver states having clear understanding and realistic expectations regarding the potential risks and potential benefits LVAD implantation and agrees to discuss with health care team members and support system members, as well as to  utilize available resources and express questions and/or concerns in order to further facilitate the pt informed decision-making.  Resources and information provided and reviewed.     Patient and Caregiver is aware of [facility]'s affiliation and/or partnership with agencies in home health care, LTAC, SNF, [facility] and other hospitals and clinics.    Understanding: Patient and Caregiver reports having a clear understanding of the many lifetime commitments involved with being an LVAD recipient, including costs, compliance, medications, lab work, procedures, appointments, concrete and financial planning, preparedness, timely and appropriate communication of concerns, abstinence (ETOH, tobacco, illicit non-prescribed drugs), adherence to all health care team recommendations, support system and caregiver involvement, appropriate and timely resource utilization and follow-through, mental health counseling as needed/recommended, and patient and caregiver responsibilities.  Social Service Handbook, resources and detailed educational information provided and reviewed.  Educational information provided.    Patient and Caregiver also reports current and expected compliance with health care regime and states having a clear understanding of the importance of compliance.      Patient and Caregiver reports a clear understanding that risks and benefits may be involved with LVAD heart failure treatment.    Patient and Caregiver also reports clear understanding that psychosocial risk factors may affect patient, and include but are not limited to feelings of depression, generalized anxiety, anxiety regarding dependence on others, post traumatic stress disorder, feelings of guilt and other emotional and/or mental concerns, and/or exacerbation of existing mental health concerns.  Detailed resources provided and discussed.    Patient and Caregiver agrees to access appropriate resources in a timely manner as needed and/or as recommended, and to  communicate concerns appropriately.  Patient and Caregiver also reports a clear understanding of treatment options available.      Feelings or Concerns: Pt states he is willing to work with psychiatry during this hospitalization. LCSW informed Jan Pruitt MD. Pt states he would rather have the LVAD implanted during this hospital stay if possible.     Coping: Identify Patient & Caregiver Strategies to [place]:   1. Currently & Pre-transplant - music, talking, playing his play station.    2. At the time of surgery - Same    3. During post-Transplant & Recovery Period - Same     Goals: Pt states he would like live as long as possible and eventually become a nurse.     Interview Behavior: Patient and Caregiver presents as alert and oriented x 4, pleasant, recall good, concentration/judgement good, calm, communicative, cooperative and asking and answering questions appropriately.      Suitability for LVAD: Patient presents as a suitable candidate for LVAD and transplant at this time. Based on psychosocial risk factors, patient presents as high risk, due to personal income, mental health concerns and past substance use within the last year.  Pt strengths include adequate health insurance, supportive family members, no legal concerns.    Recommendations/Additional Comments:     Patient should follow up with mental health resources, in order to alleviate anxiety and identify good coping skills prior to transplant. LCSW asked Jan Pruitt MD to place psych consult.      Encouraged Pt & caregiver(s) to consider fundraising for OHT. Education provided on discussing setting up fundraising account with bank to protect gifts/donations.      Transplant committee to determine final decision regarding transplant eligibility.    Michael Pedraza, DARLENE-BACS

## 2022-04-20 ENCOUNTER — TELEPHONE (OUTPATIENT)
Dept: TRANSPLANT | Facility: CLINIC | Age: 37
End: 2022-04-20
Payer: MEDICAID

## 2022-04-20 ENCOUNTER — COMMITTEE REVIEW (OUTPATIENT)
Dept: TRANSPLANT | Facility: CLINIC | Age: 37
End: 2022-04-20
Payer: MEDICAID

## 2022-04-20 DIAGNOSIS — I50.42 CHRONIC COMBINED SYSTOLIC AND DIASTOLIC CONGESTIVE HEART FAILURE: Primary | ICD-10-CM

## 2022-04-20 LAB
ALBUMIN SERPL BCP-MCNC: 4.5 G/DL (ref 3.5–5.2)
ALP SERPL-CCNC: 97 U/L (ref 55–135)
ALT SERPL W/O P-5'-P-CCNC: 39 U/L (ref 10–44)
ANION GAP SERPL CALC-SCNC: 14 MMOL/L (ref 8–16)
ANION GAP SERPL CALC-SCNC: 14 MMOL/L (ref 8–16)
AST SERPL-CCNC: 40 U/L (ref 10–40)
AT III AG ACT/NOR PPP IA: 84 % (ref 80–120)
BASOPHILS # BLD AUTO: 0.04 K/UL (ref 0–0.2)
BASOPHILS NFR BLD: 0.5 % (ref 0–1.9)
BILIRUB SERPL-MCNC: 0.7 MG/DL (ref 0.1–1)
BUN SERPL-MCNC: 33 MG/DL (ref 6–20)
BUN SERPL-MCNC: 33 MG/DL (ref 6–20)
CALCIUM SERPL-MCNC: 10.3 MG/DL (ref 8.7–10.5)
CALCIUM SERPL-MCNC: 10.3 MG/DL (ref 8.7–10.5)
CHLORIDE SERPL-SCNC: 95 MMOL/L (ref 95–110)
CHLORIDE SERPL-SCNC: 95 MMOL/L (ref 95–110)
CO2 SERPL-SCNC: 26 MMOL/L (ref 23–29)
CO2 SERPL-SCNC: 26 MMOL/L (ref 23–29)
CREAT SERPL-MCNC: 1.9 MG/DL (ref 0.5–1.4)
CREAT SERPL-MCNC: 1.9 MG/DL (ref 0.5–1.4)
DIFFERENTIAL METHOD: ABNORMAL
EOSINOPHIL # BLD AUTO: 0.3 K/UL (ref 0–0.5)
EOSINOPHIL NFR BLD: 3.3 % (ref 0–8)
ERYTHROCYTE [DISTWIDTH] IN BLOOD BY AUTOMATED COUNT: 14.4 % (ref 11.5–14.5)
EST. GFR  (AFRICAN AMERICAN): 50.9 ML/MIN/1.73 M^2
EST. GFR  (AFRICAN AMERICAN): 50.9 ML/MIN/1.73 M^2
EST. GFR  (NON AFRICAN AMERICAN): 44.1 ML/MIN/1.73 M^2
EST. GFR  (NON AFRICAN AMERICAN): 44.1 ML/MIN/1.73 M^2
G6PD RBC-CCNT: 12.1 U/G HB (ref 8–11.9)
GLUCOSE SERPL-MCNC: 138 MG/DL (ref 70–110)
GLUCOSE SERPL-MCNC: 138 MG/DL (ref 70–110)
HCT VFR BLD AUTO: 42.9 % (ref 40–54)
HCYS SERPL-SCNC: 15.7 UMOL/L (ref 4–16.5)
HCYS SERPL-SCNC: 18.4 UMOL/L (ref 4–16.5)
HGB BLD-MCNC: 14.5 G/DL (ref 14–18)
IMM GRANULOCYTES # BLD AUTO: 0.01 K/UL (ref 0–0.04)
IMM GRANULOCYTES NFR BLD AUTO: 0.1 % (ref 0–0.5)
LYMPHOCYTES # BLD AUTO: 2.1 K/UL (ref 1–4.8)
LYMPHOCYTES NFR BLD: 27 % (ref 18–48)
MAGNESIUM SERPL-MCNC: 2.3 MG/DL (ref 1.6–2.6)
MCH RBC QN AUTO: 32.2 PG (ref 27–31)
MCHC RBC AUTO-ENTMCNC: 33.8 G/DL (ref 32–36)
MCV RBC AUTO: 95 FL (ref 82–98)
MONOCYTES # BLD AUTO: 0.8 K/UL (ref 0.3–1)
MONOCYTES NFR BLD: 10.2 % (ref 4–15)
NEUTROPHILS # BLD AUTO: 4.7 K/UL (ref 1.8–7.7)
NEUTROPHILS NFR BLD: 58.9 % (ref 38–73)
NRBC BLD-RTO: 0 /100 WBC
PLATELET # BLD AUTO: 263 K/UL (ref 150–450)
PMV BLD AUTO: 10 FL (ref 9.2–12.9)
POCT GLUCOSE: 200 MG/DL (ref 70–110)
POCT GLUCOSE: 219 MG/DL (ref 70–110)
POCT GLUCOSE: 259 MG/DL (ref 70–110)
POCT GLUCOSE: 325 MG/DL (ref 70–110)
POTASSIUM SERPL-SCNC: 4.1 MMOL/L (ref 3.5–5.1)
POTASSIUM SERPL-SCNC: 4.1 MMOL/L (ref 3.5–5.1)
PROT SERPL-MCNC: 8.8 G/DL (ref 6–8.4)
RBC # BLD AUTO: 4.51 M/UL (ref 4.6–6.2)
SODIUM SERPL-SCNC: 135 MMOL/L (ref 136–145)
SODIUM SERPL-SCNC: 135 MMOL/L (ref 136–145)
WBC # BLD AUTO: 7.92 K/UL (ref 3.9–12.7)

## 2022-04-20 PROCEDURE — 25000003 PHARM REV CODE 250: Mod: NTX | Performed by: NURSE PRACTITIONER

## 2022-04-20 PROCEDURE — 63600175 PHARM REV CODE 636 W HCPCS: Mod: NTX | Performed by: INTERNAL MEDICINE

## 2022-04-20 PROCEDURE — 63600175 PHARM REV CODE 636 W HCPCS: Mod: NTX | Performed by: STUDENT IN AN ORGANIZED HEALTH CARE EDUCATION/TRAINING PROGRAM

## 2022-04-20 PROCEDURE — 85025 COMPLETE CBC W/AUTO DIFF WBC: CPT | Mod: NTX | Performed by: NURSE PRACTITIONER

## 2022-04-20 PROCEDURE — 99233 SBSQ HOSP IP/OBS HIGH 50: CPT | Mod: NTX,,, | Performed by: INTERNAL MEDICINE

## 2022-04-20 PROCEDURE — 99233 PR SUBSEQUENT HOSPITAL CARE,LEVL III: ICD-10-PCS | Mod: NTX,,, | Performed by: INTERNAL MEDICINE

## 2022-04-20 PROCEDURE — 25000003 PHARM REV CODE 250: Mod: NTX | Performed by: INTERNAL MEDICINE

## 2022-04-20 PROCEDURE — 94729 DIFFUSING CAPACITY: CPT | Mod: NTX | Performed by: INTERNAL MEDICINE

## 2022-04-20 PROCEDURE — 80053 COMPREHEN METABOLIC PANEL: CPT | Mod: NTX | Performed by: NURSE PRACTITIONER

## 2022-04-20 PROCEDURE — 99222 1ST HOSP IP/OBS MODERATE 55: CPT | Mod: AF,HB,NTX, | Performed by: PSYCHIATRY & NEUROLOGY

## 2022-04-20 PROCEDURE — 94010 BREATHING CAPACITY TEST: CPT | Mod: NTX | Performed by: INTERNAL MEDICINE

## 2022-04-20 PROCEDURE — 83090 ASSAY OF HOMOCYSTEINE: CPT | Mod: NTX | Performed by: PHYSICIAN ASSISTANT

## 2022-04-20 PROCEDURE — 20600001 HC STEP DOWN PRIVATE ROOM: Mod: NTX

## 2022-04-20 PROCEDURE — 99222 PR INITIAL HOSPITAL CARE,LEVL II: ICD-10-PCS | Mod: AF,HB,NTX, | Performed by: PSYCHIATRY & NEUROLOGY

## 2022-04-20 PROCEDURE — 36415 COLL VENOUS BLD VENIPUNCTURE: CPT | Mod: NTX | Performed by: NURSE PRACTITIONER

## 2022-04-20 PROCEDURE — 94727 GAS DIL/WSHOT DETER LNG VOL: CPT | Mod: NTX | Performed by: INTERNAL MEDICINE

## 2022-04-20 PROCEDURE — 94761 N-INVAS EAR/PLS OXIMETRY MLT: CPT | Mod: NTX

## 2022-04-20 PROCEDURE — 83735 ASSAY OF MAGNESIUM: CPT | Mod: NTX | Performed by: NURSE PRACTITIONER

## 2022-04-20 PROCEDURE — 63600175 PHARM REV CODE 636 W HCPCS: Mod: NTX | Performed by: NURSE PRACTITIONER

## 2022-04-20 RX ORDER — METHOCARBAMOL 500 MG/1
500 TABLET, FILM COATED ORAL 2 TIMES DAILY PRN
Status: DISCONTINUED | OUTPATIENT
Start: 2022-04-20 | End: 2022-04-25 | Stop reason: HOSPADM

## 2022-04-20 RX ORDER — CYCLOBENZAPRINE HCL 5 MG
5 TABLET ORAL 3 TIMES DAILY PRN
Status: DISCONTINUED | OUTPATIENT
Start: 2022-04-20 | End: 2022-04-20

## 2022-04-20 RX ORDER — ESCITALOPRAM OXALATE 5 MG/1
5 TABLET ORAL DAILY
Status: DISCONTINUED | OUTPATIENT
Start: 2022-04-20 | End: 2022-04-25 | Stop reason: HOSPADM

## 2022-04-20 RX ADMIN — SACUBITRIL AND VALSARTAN 1 TABLET: 24; 26 TABLET, FILM COATED ORAL at 08:04

## 2022-04-20 RX ADMIN — PANTOPRAZOLE SODIUM 40 MG: 40 TABLET, DELAYED RELEASE ORAL at 08:04

## 2022-04-20 RX ADMIN — MILRINONE LACTATE IN DEXTROSE 0.5 MCG/KG/MIN: 200 INJECTION, SOLUTION INTRAVENOUS at 11:04

## 2022-04-20 RX ADMIN — MILRINONE LACTATE IN DEXTROSE 0.5 MCG/KG/MIN: 200 INJECTION, SOLUTION INTRAVENOUS at 01:04

## 2022-04-20 RX ADMIN — SPIRONOLACTONE 25 MG: 25 TABLET, FILM COATED ORAL at 08:04

## 2022-04-20 RX ADMIN — HEPARIN SODIUM 5000 UNITS: 5000 INJECTION INTRAVENOUS; SUBCUTANEOUS at 02:04

## 2022-04-20 RX ADMIN — ESCITALOPRAM OXALATE 5 MG: 5 TABLET, FILM COATED ORAL at 04:04

## 2022-04-20 RX ADMIN — FUROSEMIDE 20 MG/HR: 10 INJECTION, SOLUTION INTRAMUSCULAR; INTRAVENOUS at 12:04

## 2022-04-20 RX ADMIN — FERROUS SULFATE TAB 325 MG (65 MG ELEMENTAL FE) 1 EACH: 325 (65 FE) TAB at 08:04

## 2022-04-20 RX ADMIN — INSULIN ASPART 2 UNITS: 100 INJECTION, SOLUTION INTRAVENOUS; SUBCUTANEOUS at 08:04

## 2022-04-20 RX ADMIN — MILRINONE LACTATE IN DEXTROSE 0.5 MCG/KG/MIN: 200 INJECTION, SOLUTION INTRAVENOUS at 04:04

## 2022-04-20 RX ADMIN — METHOCARBAMOL TABLETS 500 MG: 500 TABLET, COATED ORAL at 12:04

## 2022-04-20 RX ADMIN — MILRINONE LACTATE IN DEXTROSE 0.5 MCG/KG/MIN: 200 INJECTION, SOLUTION INTRAVENOUS at 08:04

## 2022-04-20 RX ADMIN — DIGOXIN 0.12 MG: 125 TABLET ORAL at 08:04

## 2022-04-20 RX ADMIN — BUSPIRONE HYDROCHLORIDE 7.5 MG: 5 TABLET ORAL at 08:04

## 2022-04-20 RX ADMIN — HEPARIN SODIUM 5000 UNITS: 5000 INJECTION INTRAVENOUS; SUBCUTANEOUS at 10:04

## 2022-04-20 RX ADMIN — INSULIN ASPART 2 UNITS: 100 INJECTION, SOLUTION INTRAVENOUS; SUBCUTANEOUS at 12:04

## 2022-04-20 RX ADMIN — INSULIN ASPART 3 UNITS: 100 INJECTION, SOLUTION INTRAVENOUS; SUBCUTANEOUS at 05:04

## 2022-04-20 RX ADMIN — SUCRALFATE 1 G: 1 TABLET ORAL at 08:04

## 2022-04-20 RX ADMIN — MIRTAZAPINE 15 MG: 15 TABLET, FILM COATED ORAL at 08:04

## 2022-04-20 RX ADMIN — ASPIRIN 81 MG CHEWABLE TABLET 81 MG: 81 TABLET CHEWABLE at 08:04

## 2022-04-20 NOTE — ASSESSMENT & PLAN NOTE
ASSESSMENT     Kevan Queen is a 37 y.o. male with a past psychiatric history of anxiety, depression, currently presenting with acute on chronic combined systolic and diastolic heart failure, NYHA class 4.  Psychiatry was originally consulted to address the patient's symptoms of anxiety and depression, currently being treated with remeron 15 mg nightly and buspar 7.5 mg BID. No prior psychiatric treatment. Pt will require referral to mental health services outpatient upon discharge.     IMPRESSION  Unspecified Anxiety Disorder  Unspecified Depressive Disorder  R/o PTSD    RECOMMENDATION(S)      1. Scheduled Medication(s):  Continue Remeron 15 mg nightly for depression/insomnia  - Discontinue buspar 7.5 mg BID  - Initiate Lexapro 5 mg daily for anxiety/depression    2. PRN Medication(s):  None    3. Legal Status/Precaution(s):  Patient does not meet criteria for PEC or inpatient psychiatric admission at this time. Patient is not currently an imminent danger to self or others and is not gravely disabled due to a psychiatric illness.    4. Other:   -Pt would benefit from referral to outpatient psychiatry and therapy upon discharge. Referral may be placed, or pt may call  at [phone] closer to discharge to schedule an appointment.     Will continue to follow. Please contact on-call [facility] psychiatry provider for any acute questions.

## 2022-04-20 NOTE — ASSESSMENT & PLAN NOTE
NIDCM , on home Milrinone, S/P ICD, h/o polysubstance abuse, tox screen 4/8 -ve, admitted with ADHF and for consideration for advanced options w/u:    -RHC on Milrinone 0.25 mcg on 4/8: RA 15, PA 59/35 (43), W 33 and CO/CI 3.27/1.47  -TTE on Milrinone 0.25 mcg on 4/8: LVEDD 7, LVEF 10%, grade 3 diastolic dysfunction, RV severely dilated, mod to severely depressed, mod-severe MR, mild TR, PAS > 50 and IVC 8  - Admit labs showed sCr 1.7 (baseline of ~ 1.6)  - Increased Milrinone to 0.375 mcg/kg/min on admit. Milrinone then increased to 0.5 mcg/kg/min overnight 2/2 drop in sVO2. sVO2 has since improved to 57 with CO/CI 5.05/2.23 and SVR 1124  - GDMT; Hold Toprol given decompensation. Continue Entresto and Aldactone  - Step 3 of VAD only pathway despite favorable blood type (A) (still smokes)    RHC 4/19 on 0.5 gtt milrinone   · RA 11 PA 50/27 (35) PCWP 27 CO 3.7 l/min CI 1.7 l/min/m2.  · PVR 2.2 CHRISTENSEN    Plan  -started IV diuresis on 4/19, keep milrinone 0.5, need to decrease filling pressures with diuresis to improve output  -K>4, Mg>2, tele  -on entresto 24/26 bid for unloading
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
## 2022-04-20 NOTE — SUBJECTIVE & OBJECTIVE
Past Medical History:   Diagnosis Date    Arthritis     Cardiomyopathy     CHF (congestive heart failure) 10/01/2020    Dilated cardiomyopathy 10/26/2020    Drug abuse 10/2020    Renal disorder      Past Surgical History:   Procedure Laterality Date    CARDIAC DEFIBRILLATOR PLACEMENT      RIGHT HEART CATHETERIZATION Right 2022    Procedure: INSERTION, CATHETER, RIGHT HEART;  Surgeon: Luca Lopez Jr., MD;  Location: [facility] CATH LAB;  Service: Cardiology;  Laterality: Right;    RIGHT HEART CATHETERIZATION Right 2022    Procedure: INSERTION, CATHETER, RIGHT HEART;  Surgeon: Josh Pulido MD;  Location: [facility] CATH LAB;  Service: Cardiology;  Laterality: Right;     Family History       Problem Relation (Age of Onset)    Diverticulosis Brother    Heart attack Maternal Grandmother, Maternal Grandfather    Heart failure Father          Tobacco Use    Smoking status: Former Smoker     Packs/day: 0.50     Years: 16.00     Pack years: 8.00     Start date: 10/1/2004     Quit date: 10/1/2020     Years since quittin.5    Smokeless tobacco: Never Used   Substance and Sexual Activity    Alcohol use: Not Currently    Drug use: Not Currently     Types: Marijuana, MDMA (Ecstacy)    Sexual activity: Yes     Partners: Female     Birth control/protection: None     Review of patient's allergies indicates:   Allergen Reactions    Bumex [bumetanide] Hives    Lactose Diarrhea    Torsemide Hives       No current facility-administered medications on file prior to encounter.     Current Outpatient Medications on File Prior to Encounter   Medication Sig    aspirin 81 MG Chew Take 81 mg by mouth once daily.    busPIRone (BUSPAR) 7.5 MG tablet Take 7.5 mg by mouth every 12 (twelve) hours.    furosemide (LASIX) 80 MG tablet Take 80 mg by mouth 2 (two) times daily.    metoprolol succinate (TOPROL-XL) 25 MG 24 hr tablet TAKE 1 TABLET(25 MG) BY MOUTH EVERY DAY    milrinone (PRIMACOR) 1 mg/mL injection Inject into the vein.     mirtazapine (REMERON) 15 MG tablet Take 15 mg by mouth nightly.    pantoprazole (PROTONIX) 40 MG tablet TK 1 T PO D  ON AN OES    potassium chloride (K-TAB) 20 mEq Take 20 mEq by mouth once daily.    prochlorperazine (COMPAZINE) 5 MG tablet Take 5 mg by mouth every 6 (six) hours as needed.    promethazine (PHENERGAN) 12.5 MG Tab Take 12.5 mg by mouth every 6 (six) hours as needed.    sacubitriL-valsartan (ENTRESTO) 24-26 mg per tablet Take 1 tablet by mouth 2 (two) times daily.    spironolactone (ALDACTONE) 25 MG tablet Take 12.5 mg by mouth once daily.    sucralfate (CARAFATE) 1 gram tablet Take 1 g by mouth nightly.    traMADoL (ULTRAM) 50 mg tablet Take 50 mg by mouth every 6 (six) hours as needed.    albuterol (PROVENTIL/VENTOLIN HFA) 90 mcg/actuation inhaler INHALE 2 PUFFS BY MOUTH EVERY 4 HOURS AS NEEDED FOR COUGH OR WHEEZING    ferrous sulfate 325 (65 FE) MG EC tablet Take 1 tablet (325 mg total) by mouth once daily.    metOLazone (ZAROXOLYN) 2.5 MG tablet Take 2.5 mg by mouth every other day.    sodium chloride 0.9% (NORMAL SALINE FLUSH) injection      Psychotherapeutics (From admission, onward)                Start     Stop Route Frequency Ordered    04/12/22 2100  busPIRone split tablet 7.5 mg         -- Oral Every 12 hours 04/12/22 1244    04/12/22 2100  mirtazapine tablet 15 mg         -- Oral Nightly 04/12/22 1244          Complete review of systems performed covering Constitutional, Eyes, ENT, Cardiovascular, Respiratory, Gastrointestinal, Musculoskeletal, Skin, Neurologic, and Psychiatric.      Complete review of systems was negative with the exception of the following positive symptoms: anxiety, racing thoughts, RAMIREZ    Strengths and Liabilities: Strength: Patient accepts guidance/feedback, Strength: Patient is expressive/articulate., Strength: Patient is intelligent., Strength: Patient is motivated for change., Strength: Patient has positive support network.    Objective:     Vital Signs (Most  "Recent):  Temp: 97.1 °F (36.2 °C) (04/20/22 0719)  Pulse: 105 (04/20/22 0732)  Resp: 20 (04/20/22 0719)  BP: (!) 107/53 (04/20/22 0719)  SpO2: 97 % (04/20/22 0719)   Vital Signs (24h Range):  Temp:  [97.1 °F (36.2 °C)-98 °F (36.7 °C)] 97.1 °F (36.2 °C)  Pulse:  [] 105  Resp:  [16-20] 20  SpO2:  [94 %-99 %] 97 %  BP: (104-127)/(53-76) 107/53     Height: 6' 3" (190.5 cm)  Weight: 93.9 kg (207 lb)  Body mass index is 25.87 kg/m².      Intake/Output Summary (Last 24 hours) at 4/20/2022 0949  Last data filed at 4/20/2022 0700  Gross per 24 hour   Intake 660 ml   Output 4600 ml   Net -3940 ml     Mental Status Exam:  Appearance: age appropriate, casually dressed, neatly groomed  Behavior/Cooperation: friendly and cooperative  Speech: normal tone, normal rate, normal pitch, normal volume  Mood:  "happy, but mad at the situation  Affect:  reactive, appropriate, mood congruent  Thought Process: goal-directed, logical  Thought Content: no suicidality, no homicidality  Perceptions: No AVH observed or reported  Orientation: person, place, situation, time/date  Level of Consciousness: Alert, Awake  Memory:  ntact to recent and remote events  Attention/concentration: able to spell WORLD forwards and backwards  Abstract reasoning: intact, interpreted "don't  a book by its cover"  Insight: good  Judgment: good  MSK: gait and station normal      Significant Labs: Last 24 Hours:   Recent Lab Results  (Last 5 results in the past 24 hours)        04/20/22  0720   04/20/22  0325   04/19/22 2026 04/19/22  1559   04/19/22  1126        Albumin   4.5             Alkaline Phosphatase   97             ALT   39             Anion Gap   14                14             Ascending aorta       2.62         Ao peak krystina       1.05         Ao VTI       17.01         AST   40             AV valve area       1.68         AV mean gradient       2         AV index (prosthetic)       0.52         AV peak gradient       4         AV Velocity " Ratio       0.57         Baso #   0.04             Basophil %   0.5             BILIRUBIN TOTAL   0.7  Comment: For infants and newborns, interpretation of results should be based  on gestational age, weight and in agreement with clinical  observations.    Premature Infant recommended reference ranges:  Up to 24 hours.............<8.0 mg/dL  Up to 48 hours............<12.0 mg/dL  3-5 days..................<15.0 mg/dL  6-29 days.................<15.0 mg/dL               BSA       2.23         BUN   33                33             Calcium   10.3                10.3             Chloride   95                95             CO2   26                26             Creatinine   1.9                1.9             Left Ventricle Relative Wall Thickness       0.18         Differential Method   Automated             E/E' ratio       13.75         eGFR if    50.9                50.9             eGFR if non    44.1  Comment: Calculation used to obtain the estimated glomerular filtration  rate (eGFR) is the CKD-EPI equation.                   44.1  Comment: Calculation used to obtain the estimated glomerular filtration  rate (eGFR) is the CKD-EPI equation.                EF       15         Eos #   0.3             Eosinophil %   3.3             FS       8         Glucose   138                138             Gran # (ANC)   4.7             Gran %   58.9             Hematocrit   42.9             Hemoglobin   14.5             Immature Grans (Abs)   0.01  Comment: Mild elevation in immature granulocytes is non specific and   can be seen in a variety of conditions including stress response,   acute inflammation, trauma and pregnancy. Correlation with other   laboratory and clinical findings is essential.               Immature Granulocytes   0.1             IVS       0.61         LA WIDTH       4.80         Left Atrium Major Axis       6.05         Left Atrium Minor Axis       5.03         LA size        4.07         LA volume       91.22                84.34         LA Volume Index       40.9         LA Volume Index (Mod)       37.8         LVOT area       3.2         LV LATERAL E/E' RATIO       11.00         LV SEPTAL E/E' RATIO       18.33         LV Diastolic Volume       280.81         LV Diastolic Volume Index       125.92         LVIDd       7.30         LVIDs       6.74         LV mass       202.45         LV Mass Index       91         LVOT diameter       2.03         LVOT peak carloz       0.60         LVOT stroke volume       28.53         LVOT peak VTI       8.82         LV Systolic Volume       234.79         LV Systolic Volume Index       105.3         Lymph #   2.1             Lymph %   27.0             Magnesium   2.3             MCH   32.2             MCHC   33.8             MCV   95             Mean e'       0.08         Mono #   0.8             Mono %   10.2             MPV   10.0             Mr max carloz       0.04         MV Peak E Carloz       1.10         nRBC   0             Platelets   263             POCT Glucose 200     191     235       Potassium   4.1                4.1             PROTEIN TOTAL   8.8             Posterior Wall       0.67         Right Atrial Pressure (from IVC)       3         RA Major Axis       6.04         RA Width       2.63         RBC   4.51             RDW   14.4             RV S'       8.43         RVDD       5.60         Sinus       3.01         Sodium   135                135             STJ       2.15         TAPSE       2.22         TDI SEPTAL       0.06         TDI LATERAL       0.10         Triscuspid Valve Regurgitation Peak Gradient       31         TR Max Carloz       2.80         TV rest pulmonary artery pressure       34         WBC   7.92                                    Significant Imaging: I have reviewed all pertinent imaging results/findings within the past 24 hours.

## 2022-04-20 NOTE — COMMITTEE REVIEW
Native Organ Dx: NICM    Presented Mr. Queen at Heart Transplant/VAD Selection Committee for consideration of LVAD placement.  Mr Queen was not evaluated for OHT as he has recently quit smoking in April 2022.    Mr Queen was approved for VAD, plan to begin OHT evaluation in upcoming months if Mr Queen is stable and suitable for OHT eval.      Note was written by Daniela Guy RN.    =================================================    I was present at the meeting and attest to the decision of the committee.    Josh Pulido.

## 2022-04-20 NOTE — PLAN OF CARE
Plan of care reviewed with patient. Lasix and Milrinone drip infusing as ordered. Patient ambulating in hallways. Patient expressing anxiety over health status. Psychiatry following and adjusting medications. Patient educated on importance of compliance and strict intake/output. No questions at this time.

## 2022-04-20 NOTE — PROGRESS NOTES
[facility] - Cardiology Stepdown  Heart Transplant  Progress Note    Patient Name: Kevan Queen  MRN: [id number]  Admission Date: 4/12/2022  Hospital Length of Stay: 8 days  Attending Physician: Luca Lopez Jr.,*  Primary Care Provider: ORALIA Cline  Principal Problem:Acute on chronic combined systolic and diastolic heart failure, NYHA class 4    Subjective:     Interval History:   -RHC yesterday with W 27 and CI 1.7 on 0.5 gtt milrinone, put him on IV lasix gtt, however, Cr has been trending up for the past few days, up at 1.9 from 1.4 last week  -will de escalate lasix gtt from 20 to 10 today due to rising Cr, will try to up-titrate entresto tomorrow  -RA to W ratio was 0.4 yesterday  -reports severe muscle cramps with normal K and Mg, will order prn low dose robaxin  -reports severe anxiety and fear of dying, has h/o polysubstance abuse, already on remeron, buspar, consulted psych to [facility] with [facility] medication [facility]  -being presented in the selection meeting today for VAD only   -NSVT runs x 2 at 3 am and 8 am, has AICD, will watch w/o AAT for now and keep K>4, Mg>2    Continuous Infusions:   furosemide (LASIX) 10 mg/mL infusion (non-titrating) 20 mg/hr (04/20/22 0034)    milrinone 20mg/100ml D5W (200mcg/ml) 0.5 mcg/kg/min (04/20/22 0844)     Scheduled Meds:   aspirin  81 mg Oral Daily    busPIRone  7.5 mg Oral Q12H    digoxin  0.125 mg Oral Daily    ferrous sulfate  1 tablet Oral Daily    heparin (porcine)  5,000 Units Subcutaneous Q8H    mirtazapine  15 mg Oral Nightly    pantoprazole  40 mg Oral Daily    sacubitriL-valsartan  1 tablet Oral BID    spironolactone  25 mg Oral Daily    sucralfate  1 g Oral Nightly     PRN Meds:acetaminophen, dextrose 10%, dextrose 10%, glucagon (human recombinant), glucose, glucose, insulin aspart U-100, LIDOcaine-EPINEPHrine 1%-1:100,000, methocarbamoL, sodium chloride 0.9%    Review of patient's allergies indicates:   Allergen Reactions    Bumex  [bumetanide] Hives    Lactose Diarrhea    Torsemide Hives     Objective:     Vital Signs (Most Recent):  Temp: 97.1 °F (36.2 °C) (04/20/22 0719)  Pulse: 105 (04/20/22 0732)  Resp: 20 (04/20/22 0719)  BP: (!) 107/53 (04/20/22 0719)  SpO2: 97 % (04/20/22 0719)   Vital Signs (24h Range):  Temp:  [97.1 °F (36.2 °C)-98 °F (36.7 °C)] 97.1 °F (36.2 °C)  Pulse:  [] 105  Resp:  [16-20] 20  SpO2:  [94 %-99 %] 97 %  BP: (104-127)/(53-76) 107/53     Patient Vitals for the past 72 hrs (Last 3 readings):   Weight   04/19/22 1551 93.9 kg (207 lb)   04/18/22 0742 93.9 kg (207 lb 0.2 oz)   04/18/22 0709 94 kg (207 lb 3.7 oz)     Body mass index is 25.87 kg/m².      Intake/Output Summary (Last 24 hours) at 4/20/2022 0930  Last data filed at 4/20/2022 0700  Gross per 24 hour   Intake 660 ml   Output 4600 ml   Net -3940 ml       Hemodynamic Parameters:  CVP:  [8 mmHg] 8 mmHg    Telemetry: rev'd    Physical Exam  Constitutional: No distress, obese, conversant  HEENT: Sclera anicteric, PERRLA, EOMI  Neck: JVP at the clavicle, no masses, good movement  CV: RRR, S1 and S2 normal, no additional heart sounds or murmurs. Pulses 2+ and equal bilaterally in radial arteries, Johnathan's normal on right. Distal pulses are 2+ and equal in the femoral, DP and PT areas bilaterally  JVD 11 cmH2O  Pulm: Clear to auscultation bilaterally with symmetrical expansion. Chest wall palpated for reproduction of pain symptoms, and no pain was able to be produced on palpation or resistance exercises  GI: Abdomen soft, non-tender, good bowel sounds  Extremities: Both extremities intact and grossly normal, skin is warm, PICC in right UE   Psych: AOx3, appropriate affect  Neuro: CNII-XII intact, no focal deficits  Significant Labs:  CBC:  Recent Labs   Lab 04/18/22  0407 04/19/22  0430 04/20/22  0325   WBC 7.34 6.92 7.92   RBC 4.31* 4.39* 4.51*   HGB 13.5* 13.9* 14.5   HCT 41.1 41.1 42.9    248 263   MCV 95 94 95   MCH 31.3* 31.7* 32.2*   MCHC 32.8 33.8  33.8     BNP:  Recent Labs   Lab 04/18/22  1403   *     CMP:  Recent Labs   Lab 04/18/22  0407 04/19/22  0430 04/20/22  0325   *  157* 237*  237* 138*  138*   CALCIUM 10.1  10.1 10.0  10.0 10.3  10.3   ALBUMIN 3.8 4.1 4.5   PROT 7.3 7.8 8.8*     136 134*  134* 135*  135*   K 4.1  4.1 4.0  4.0 4.1  4.1   CO2 27  27 24  24 26  26   CL 98  98 95  95 95  95   BUN 33*  33* 31*  31* 33*  33*   CREATININE 1.6*  1.6* 1.8*  1.8* 1.9*  1.9*   ALKPHOS 80 85 97   ALT 30 37 39   AST 31 38 40   BILITOT 0.7 0.6 0.7      Coagulation:   Recent Labs   Lab 04/19/22  0430   INR 1.1   APTT 27.6     LDH:  Recent Labs   Lab 04/18/22  1403        Microbiology:  Microbiology Results (last 7 days)       ** No results found for the last 168 hours. **            I have reviewed all pertinent labs within the past 24 hours.    Estimated Creatinine Clearance: 63.6 mL/min (A) (based on SCr of 1.9 mg/dL (H)).    Diagnostic Results:  I have reviewed all pertinent imaging results/findings within the past 24 hours.    Assessment and Plan:     36 yo BM with NICM, on home milrinone, hx of substance abuse who comes in for f/u. States that he has been doing well since last visit with Dr Lopez.Sleeps on 2 pillows. No PND anymore. Volume/weight has been stable. Great appetite. He did have to have his PICC line replaced as it got pulled out during sleep. He got his ICD placed last month in [place].      He claims being clean of any drugs, alcoho or tobacco for a long time. He came with his fiance that he has been living with for the last 8 years. They have 2 children in common. He has a total of 9 kids. Tox screen done 2/4/22 was -ve, but nicotine/cotinine screen was +ve same day. He admits to occasional cigarette smoking.      Underwent risk assessment with an echo and RHC on 4/8, both done on Milrinone 0.25 mcg/kg/min:    TTE: LVEDD 7, L:VEF 10%, grade 3 diastolic dysfunction, RV severely enlarge and  mod to severely depressed, mod to severe MR, mild TR, PAS > 50 and IVC 8    RHC: RA 15, PAP 59/35 (43), W 33 and CO/CI 3.27/1.47.    Current GDMT: Toprol 25 mg qd, Entresto 24-26 bid, Aldactone 12.5 mg qd. Takes Lasix 80 mg bid plus Metolazone 2.5 mg qod    He presents as a direct admit with AHD and for advanced options consideration            * Acute on chronic combined systolic and diastolic heart failure, NYHA class 4  NIDCM , on home Milrinone, S/P ICD, h/o polysubstance abuse, tox screen 4/8 -ve, admitted with ADHF and for consideration for advanced options w/u:    -RHC on Milrinone 0.25 mcg on 4/8: RA 15, PA 59/35 (43), W 33 and CO/CI 3.27/1.47  -TTE on Milrinone 0.25 mcg on 4/8: LVEDD 7, LVEF 10%, grade 3 diastolic dysfunction, RV severely dilated, mod to severely depressed, mod-severe MR, mild TR, PAS > 50 and IVC 8  - Admit labs showed sCr 1.7 (baseline of ~ 1.6)  - Increased Milrinone to 0.375 mcg/kg/min on admit. Milrinone then increased to 0.5 mcg/kg/min overnight 2/2 drop in sVO2. sVO2 has since improved to 57 with CO/CI 5.05/2.23 and SVR 1124  - GDMT; Hold Toprol given decompensation. Continue Entresto and Aldactone  - Step 3 of VAD only pathway despite favorable blood type (A) (still smokes)    RHC 4/19 on 0.5 gtt milrinone   · RA 11 PA 50/27 (35) PCWP 27 CO 3.7 l/min CI 1.7 l/min/m2.  · PVR 2.2 CHRISTENSEN    Plan  -started IV diuresis on 4/19, keep milrinone 0.5, need to decrease filling pressures with diuresis to improve output  -K>4, Mg>2, tele  -on entresto 24/26 bid for unloading           Tobacco use  - Continues to smoke occasionally. WIll re check nicotine/cotinine    ROC (acute kidney injury)  Cr 1.9 currently, baseline around 1.4  Continue volume management per hemodynamcis        Jan Pruitt MD  Heart Transplant  [facility] - Cardiology Stepdown

## 2022-04-20 NOTE — TELEPHONE ENCOUNTER
I spoke with Mr Bernice and reviewed the decision of the transplant and VAD selelction meeting: Approved for vad, but due to favorable blood type and age, preference is to see if he is suitable for OHT listing in a few months.  Advised that he is not a candidate oht currently due to smoking, but will plan on following closely and if he meets criteria will aim to start OHT evaluation.  Verbalized understanding of instructions and plan of care.

## 2022-04-20 NOTE — SUBJECTIVE & OBJECTIVE
Interval History:   -RHC yesterday with W 27 and CI 1.7 on 0.5 gtt milrinone, put him on IV lasix gtt, however, Cr has been trending up for the past few days, up at 1.9 from 1.4 last week  -RA to W ratio was 0.4 yesterday  -reports severe muscle cramps with normal K and Mg, will order prn low dose robaxin  -reports severe anxiety and fear of dying, has h/o polysubstance abuse, already on remeron, buspar, consulted psych to help with pscyh medication titration  -being presented in the selection meeting today for VAD only     Continuous Infusions:   furosemide (LASIX) 10 mg/mL infusion (non-titrating) 20 mg/hr (04/20/22 0034)    milrinone 20mg/100ml D5W (200mcg/ml) 0.5 mcg/kg/min (04/20/22 0844)     Scheduled Meds:   aspirin  81 mg Oral Daily    busPIRone  7.5 mg Oral Q12H    digoxin  0.125 mg Oral Daily    ferrous sulfate  1 tablet Oral Daily    heparin (porcine)  5,000 Units Subcutaneous Q8H    mirtazapine  15 mg Oral Nightly    pantoprazole  40 mg Oral Daily    sacubitriL-valsartan  1 tablet Oral BID    spironolactone  25 mg Oral Daily    sucralfate  1 g Oral Nightly     PRN Meds:acetaminophen, dextrose 10%, dextrose 10%, glucagon (human recombinant), glucose, glucose, insulin aspart U-100, LIDOcaine-EPINEPHrine 1%-1:100,000, methocarbamoL, sodium chloride 0.9%    Review of patient's allergies indicates:   Allergen Reactions    Bumex [bumetanide] Hives    Lactose Diarrhea    Torsemide Hives     Objective:     Vital Signs (Most Recent):  Temp: 97.1 °F (36.2 °C) (04/20/22 0719)  Pulse: 105 (04/20/22 0732)  Resp: 20 (04/20/22 0719)  BP: (!) 107/53 (04/20/22 0719)  SpO2: 97 % (04/20/22 0719)   Vital Signs (24h Range):  Temp:  [97.1 °F (36.2 °C)-98 °F (36.7 °C)] 97.1 °F (36.2 °C)  Pulse:  [] 105  Resp:  [16-20] 20  SpO2:  [94 %-99 %] 97 %  BP: (104-127)/(53-76) 107/53     Patient Vitals for the past 72 hrs (Last 3 readings):   Weight   04/19/22 1551 93.9 kg (207 lb)   04/18/22 0742 93.9 kg (207 lb 0.2 oz)    04/18/22 0709 94 kg (207 lb 3.7 oz)     Body mass index is 25.87 kg/m².      Intake/Output Summary (Last 24 hours) at 4/20/2022 0930  Last data filed at 4/20/2022 0700  Gross per 24 hour   Intake 660 ml   Output 4600 ml   Net -3940 ml       Hemodynamic Parameters:  CVP:  [8 mmHg] 8 mmHg    Telemetry: rev'd    Physical Exam  Constitutional: No distress, obese, conversant  HEENT: Sclera anicteric, PERRLA, EOMI  Neck: JVP at the clavicle, no masses, good movement  CV: RRR, S1 and S2 normal, no additional heart sounds or murmurs. Pulses 2+ and equal bilaterally in radial arteries, Johnathan's normal on right. Distal pulses are 2+ and equal in the femoral, DP and PT areas bilaterally  JVD 11 cmH2O  Pulm: Clear to auscultation bilaterally with symmetrical expansion. Chest wall palpated for reproduction of pain symptoms, and no pain was able to be produced on palpation or resistance exercises  GI: Abdomen soft, non-tender, good bowel sounds  Extremities: Both extremities intact and grossly normal, skin is warm, PICC in right UE   Psych: AOx3, appropriate affect  Neuro: CNII-XII intact, no focal deficits  Significant Labs:  CBC:  Recent Labs   Lab 04/18/22 0407 04/19/22  0430 04/20/22  0325   WBC 7.34 6.92 7.92   RBC 4.31* 4.39* 4.51*   HGB 13.5* 13.9* 14.5   HCT 41.1 41.1 42.9    248 263   MCV 95 94 95   MCH 31.3* 31.7* 32.2*   MCHC 32.8 33.8 33.8     BNP:  Recent Labs   Lab 04/18/22  1403   *     CMP:  Recent Labs   Lab 04/18/22  0407 04/19/22  0430 04/20/22  0325   *  157* 237*  237* 138*  138*   CALCIUM 10.1  10.1 10.0  10.0 10.3  10.3   ALBUMIN 3.8 4.1 4.5   PROT 7.3 7.8 8.8*     136 134*  134* 135*  135*   K 4.1  4.1 4.0  4.0 4.1  4.1   CO2 27  27 24  24 26  26   CL 98  98 95  95 95  95   BUN 33*  33* 31*  31* 33*  33*   CREATININE 1.6*  1.6* 1.8*  1.8* 1.9*  1.9*   ALKPHOS 80 85 97   ALT 30 37 39   AST 31 38 40   BILITOT 0.7 0.6 0.7      Coagulation:   Recent Labs    Lab 04/19/22  0430   INR 1.1   APTT 27.6     LDH:  Recent Labs   Lab 04/18/22  1403        Microbiology:  Microbiology Results (last 7 days)       ** No results found for the last 168 hours. **            I have reviewed all pertinent labs within the past 24 hours.    Estimated Creatinine Clearance: 63.6 mL/min (A) (based on SCr of 1.9 mg/dL (H)).    Diagnostic Results:  I have reviewed all pertinent imaging results/findings within the past 24 hours.

## 2022-04-21 LAB
ALBUMIN SERPL BCP-MCNC: 4.2 G/DL (ref 3.5–5.2)
ALP SERPL-CCNC: 107 U/L (ref 55–135)
ALT SERPL W/O P-5'-P-CCNC: 45 U/L (ref 10–44)
AMPHETAMINES SERPL QL: NEGATIVE
ANION GAP SERPL CALC-SCNC: 13 MMOL/L (ref 8–16)
AST SERPL-CCNC: 42 U/L (ref 10–40)
BARBITURATES SERPL QL SCN: NEGATIVE
BASOPHILS # BLD AUTO: 0.04 K/UL (ref 0–0.2)
BASOPHILS NFR BLD: 0.4 % (ref 0–1.9)
BENZODIAZ SERPL QL SCN: NEGATIVE
BILIRUB SERPL-MCNC: 0.6 MG/DL (ref 0.1–1)
BUN SERPL-MCNC: 34 MG/DL (ref 6–20)
BZE SERPL QL: NEGATIVE
CALCIUM SERPL-MCNC: 10.1 MG/DL (ref 8.7–10.5)
CARBOXYTHC SERPL QL SCN: NEGATIVE
CARDIOLIPIN IGG SER IA-ACNC: <9.4 GPL (ref 0–14.99)
CARDIOLIPIN IGM SER IA-ACNC: <9.4 MPL (ref 0–12.49)
CHLORIDE SERPL-SCNC: 95 MMOL/L (ref 95–110)
CO2 SERPL-SCNC: 27 MMOL/L (ref 23–29)
CREAT SERPL-MCNC: 1.8 MG/DL (ref 0.5–1.4)
DEPRECATED SRA 0.1U/ML PORCINE HEPARIN S: NORMAL %
DEPRECATED SRA 100U/ML PORCINE HEPARIN S: NORMAL %
DIFFERENTIAL METHOD: ABNORMAL
EOSINOPHIL # BLD AUTO: 0.3 K/UL (ref 0–0.5)
EOSINOPHIL NFR BLD: 2.2 % (ref 0–8)
ERYTHROCYTE [DISTWIDTH] IN BLOOD BY AUTOMATED COUNT: 14.5 % (ref 11.5–14.5)
EST. GFR  (AFRICAN AMERICAN): 54.4 ML/MIN/1.73 M^2
EST. GFR  (NON AFRICAN AMERICAN): 47 ML/MIN/1.73 M^2
ETHANOL SERPL QL SCN: NEGATIVE
GLUCOSE SERPL-MCNC: 225 MG/DL (ref 70–110)
HCT VFR BLD AUTO: 42.1 % (ref 40–54)
HGB BLD-MCNC: 14.1 G/DL (ref 14–18)
IMM GRANULOCYTES # BLD AUTO: 0.03 K/UL (ref 0–0.04)
IMM GRANULOCYTES NFR BLD AUTO: 0.3 % (ref 0–0.5)
LYMPHOCYTES # BLD AUTO: 1.9 K/UL (ref 1–4.8)
LYMPHOCYTES NFR BLD: 16.6 % (ref 18–48)
MAGNESIUM SERPL-MCNC: 2 MG/DL (ref 1.6–2.6)
MCH RBC QN AUTO: 31.1 PG (ref 27–31)
MCHC RBC AUTO-ENTMCNC: 33.5 G/DL (ref 32–36)
MCV RBC AUTO: 93 FL (ref 82–98)
METHADONE SERPL QL SCN: NEGATIVE
MONOCYTES # BLD AUTO: 1.1 K/UL (ref 0.3–1)
MONOCYTES NFR BLD: 10.2 % (ref 4–15)
NEUTROPHILS # BLD AUTO: 7.9 K/UL (ref 1.8–7.7)
NEUTROPHILS NFR BLD: 70.3 % (ref 38–73)
NRBC BLD-RTO: 0 /100 WBC
OPIATES SERPL QL SCN: NEGATIVE
PCP SERPL QL SCN: NEGATIVE
PLATELET # BLD AUTO: 269 K/UL (ref 150–450)
PMV BLD AUTO: 9.9 FL (ref 9.2–12.9)
POCT GLUCOSE: 142 MG/DL (ref 70–110)
POCT GLUCOSE: 176 MG/DL (ref 70–110)
POCT GLUCOSE: 202 MG/DL (ref 70–110)
POCT GLUCOSE: 203 MG/DL (ref 70–110)
POCT GLUCOSE: 255 MG/DL (ref 70–110)
POTASSIUM SERPL-SCNC: 4.1 MMOL/L (ref 3.5–5.1)
PROPOXYPH SERPL QL: NEGATIVE
PROT S AG ACT/NOR PPP IA: 93 % (ref 50–140)
PROT SERPL-MCNC: 8.4 G/DL (ref 6–8.4)
RBC # BLD AUTO: 4.53 M/UL (ref 4.6–6.2)
SODIUM SERPL-SCNC: 135 MMOL/L (ref 136–145)
SRA PORCINE INTERP SER-IMP: NORMAL
VWF AG ACT/NOR PPP IA: 199 % (ref 55–200)
VWF:AC ACT/NOR PPP IA: 176 % (ref 55–200)
WBC # BLD AUTO: 11.19 K/UL (ref 3.9–12.7)

## 2022-04-21 PROCEDURE — 20600001 HC STEP DOWN PRIVATE ROOM: Mod: NTX

## 2022-04-21 PROCEDURE — 25000003 PHARM REV CODE 250: Mod: NTX | Performed by: INTERNAL MEDICINE

## 2022-04-21 PROCEDURE — 63600175 PHARM REV CODE 636 W HCPCS: Mod: NTX | Performed by: STUDENT IN AN ORGANIZED HEALTH CARE EDUCATION/TRAINING PROGRAM

## 2022-04-21 PROCEDURE — 25000003 PHARM REV CODE 250: Mod: NTX | Performed by: NURSE PRACTITIONER

## 2022-04-21 PROCEDURE — 99233 SBSQ HOSP IP/OBS HIGH 50: CPT | Mod: NTX,,, | Performed by: INTERNAL MEDICINE

## 2022-04-21 PROCEDURE — 36415 COLL VENOUS BLD VENIPUNCTURE: CPT | Mod: NTX | Performed by: NURSE PRACTITIONER

## 2022-04-21 PROCEDURE — 99231 PR SUBSEQUENT HOSPITAL CARE,LEVL I: ICD-10-PCS | Mod: AF,HB,NTX, | Performed by: PSYCHIATRY & NEUROLOGY

## 2022-04-21 PROCEDURE — 63600175 PHARM REV CODE 636 W HCPCS: Mod: NTX | Performed by: NURSE PRACTITIONER

## 2022-04-21 PROCEDURE — C9399 UNCLASSIFIED DRUGS OR BIOLOG: HCPCS | Mod: NTX | Performed by: INTERNAL MEDICINE

## 2022-04-21 PROCEDURE — 99231 SBSQ HOSP IP/OBS SF/LOW 25: CPT | Mod: AF,HB,NTX, | Performed by: PSYCHIATRY & NEUROLOGY

## 2022-04-21 PROCEDURE — 63600175 PHARM REV CODE 636 W HCPCS: Mod: NTX | Performed by: INTERNAL MEDICINE

## 2022-04-21 PROCEDURE — 99233 PR SUBSEQUENT HOSPITAL CARE,LEVL III: ICD-10-PCS | Mod: NTX,,, | Performed by: INTERNAL MEDICINE

## 2022-04-21 PROCEDURE — 80053 COMPREHEN METABOLIC PANEL: CPT | Mod: NTX | Performed by: NURSE PRACTITIONER

## 2022-04-21 PROCEDURE — 83735 ASSAY OF MAGNESIUM: CPT | Mod: NTX | Performed by: NURSE PRACTITIONER

## 2022-04-21 PROCEDURE — 85025 COMPLETE CBC W/AUTO DIFF WBC: CPT | Mod: NTX | Performed by: NURSE PRACTITIONER

## 2022-04-21 RX ORDER — HYDROXYZINE PAMOATE 25 MG/1
25 CAPSULE ORAL NIGHTLY PRN
Status: DISCONTINUED | OUTPATIENT
Start: 2022-04-21 | End: 2022-04-25 | Stop reason: HOSPADM

## 2022-04-21 RX ADMIN — MILRINONE LACTATE IN DEXTROSE 0.5 MCG/KG/MIN: 200 INJECTION, SOLUTION INTRAVENOUS at 02:04

## 2022-04-21 RX ADMIN — MIRTAZAPINE 15 MG: 15 TABLET, FILM COATED ORAL at 09:04

## 2022-04-21 RX ADMIN — ASPIRIN 81 MG CHEWABLE TABLET 81 MG: 81 TABLET CHEWABLE at 08:04

## 2022-04-21 RX ADMIN — MILRINONE LACTATE IN DEXTROSE 0.5 MCG/KG/MIN: 200 INJECTION, SOLUTION INTRAVENOUS at 10:04

## 2022-04-21 RX ADMIN — SACUBITRIL AND VALSARTAN 1 TABLET: 49; 51 TABLET, FILM COATED ORAL at 09:04

## 2022-04-21 RX ADMIN — SPIRONOLACTONE 25 MG: 25 TABLET, FILM COATED ORAL at 08:04

## 2022-04-21 RX ADMIN — HEPARIN SODIUM 5000 UNITS: 5000 INJECTION INTRAVENOUS; SUBCUTANEOUS at 10:04

## 2022-04-21 RX ADMIN — INSULIN ASPART 2 UNITS: 100 INJECTION, SOLUTION INTRAVENOUS; SUBCUTANEOUS at 08:04

## 2022-04-21 RX ADMIN — INSULIN ASPART 2 UNITS: 100 INJECTION, SOLUTION INTRAVENOUS; SUBCUTANEOUS at 04:04

## 2022-04-21 RX ADMIN — FUROSEMIDE 10 MG/HR: 10 INJECTION, SOLUTION INTRAMUSCULAR; INTRAVENOUS at 01:04

## 2022-04-21 RX ADMIN — HEPARIN SODIUM 5000 UNITS: 5000 INJECTION INTRAVENOUS; SUBCUTANEOUS at 02:04

## 2022-04-21 RX ADMIN — SACUBITRIL AND VALSARTAN 1 TABLET: 24; 26 TABLET, FILM COATED ORAL at 08:04

## 2022-04-21 RX ADMIN — SUCRALFATE 1 G: 1 TABLET ORAL at 09:04

## 2022-04-21 RX ADMIN — MILRINONE LACTATE IN DEXTROSE 0.5 MCG/KG/MIN: 200 INJECTION, SOLUTION INTRAVENOUS at 07:04

## 2022-04-21 RX ADMIN — ESCITALOPRAM OXALATE 5 MG: 5 TABLET, FILM COATED ORAL at 08:04

## 2022-04-21 RX ADMIN — PANTOPRAZOLE SODIUM 40 MG: 40 TABLET, DELAYED RELEASE ORAL at 08:04

## 2022-04-21 RX ADMIN — INSULIN ASPART 3 UNITS: 100 INJECTION, SOLUTION INTRAVENOUS; SUBCUTANEOUS at 12:04

## 2022-04-21 RX ADMIN — INSULIN DETEMIR 5 UNITS: 100 INJECTION, SOLUTION SUBCUTANEOUS at 08:04

## 2022-04-21 RX ADMIN — DIGOXIN 0.12 MG: 125 TABLET ORAL at 08:04

## 2022-04-21 RX ADMIN — FERROUS SULFATE TAB 325 MG (65 MG ELEMENTAL FE) 1 EACH: 325 (65 FE) TAB at 08:04

## 2022-04-21 NOTE — NURSING
MD at bedside. MD aware of runs of VTACH overnight. Patient states he does not feel when his heart goes into irregular rhythm. MD measuring CVP at bedside.  No new orders at this time.

## 2022-04-21 NOTE — HOSPITAL COURSE
4/21/2022  Pt interviewed with simeon at bedside. Per chart review, pt had run of Vtach last night, pt reports he did not feel symptomatic at the time. He does report having increased anxiety last night, unable to fall asleep until after midnight. Once he fell asleep, though, he stayed asleep. Denies HA/N/V since initiating lexapro yesterday. Discussed that he will not see full effects for 4-6 weeks, pt expressed understanding. Otherwise mood is good this morning. Denies SI/HI.

## 2022-04-21 NOTE — NURSING
Pt ao x4, denies pain. Verbalizes hopefulness with good news of being placed on transplant list. Pt blood glucose 325, 2u given. milrinone and lasix replaced. Will continue to monitor.

## 2022-04-21 NOTE — NURSING
Telemetry called reporting pt v tach with  and runs. Assessed pt, pt is lying in bed, denies chest pain, sob, reports feeling fine. Vs are as follows, 91/58 hr 102; pt states this is his norm at home. Will continue to monitor. All pt needs are met, pt in no apparent distress.
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
## 2022-04-21 NOTE — CARE UPDATE
RAPID RESPONSE NURSE ROUND       Rounding completed with charge RN, Marissa. No concerns verbalized at this time. Instructed to call [phone] for further concerns or assistance.

## 2022-04-21 NOTE — SUBJECTIVE & OBJECTIVE
"    Family History       Problem Relation (Age of Onset)    Diverticulosis Brother    Heart attack Maternal Grandmother, Maternal Grandfather    Heart failure Father          Tobacco Use    Smoking status: Former Smoker     Packs/day: 0.50     Years: 16.00     Pack years: 8.00     Start date: 10/1/2004     Quit date: 10/1/2020     Years since quittin.5    Smokeless tobacco: Never Used   Substance and Sexual Activity    Alcohol use: Not Currently    Drug use: Not Currently     Types: Marijuana, MDMA (Ecstacy)    Sexual activity: Yes     Partners: Female     Birth control/protection: None     Psychotherapeutics (From admission, onward)                Start     Stop Route Frequency Ordered    22 1544  EScitalopram oxalate tablet 5 mg         -- Oral Daily 22 1544    22 2100  mirtazapine tablet 15 mg         -- Oral Nightly 22 1244               Objective:     Vital Signs (Most Recent):  Temp: 96.6 °F (35.9 °C) (22)  Pulse: 104 (22)  Resp: 18 (22)  BP: (!) 114/58 (22)  SpO2: 99 % (22)   Vital Signs (24h Range):  Temp:  [96.1 °F (35.6 °C)-97.7 °F (36.5 °C)] 96.6 °F (35.9 °C)  Pulse:  [] 104  Resp:  [16-20] 18  SpO2:  [95 %-99 %] 99 %  BP: ()/(58-89) 114/58     Height: 6' 3" (190.5 cm)  Weight: 93.9 kg (207 lb)  Body mass index is 25.87 kg/m².      Intake/Output Summary (Last 24 hours) at 2022 0915  Last data filed at 2022 0900  Gross per 24 hour   Intake 3241.24 ml   Output 3265 ml   Net -23.76 ml     Mental Status Exam:  Appearance: age appropriate, casually dressed  Behavior/Cooperation: friendly and cooperative  Speech: normal tone, normal rate, normal pitch, normal volume  Mood:  "good"  Affect:  reactive, appropriate, mood congruent  Thought Process: goal-directed, logical  Thought Content: no suicidality, no homicidality  Perceptions: No AVH observed or reported  Orientation: person, place, situation  Level of " Consciousness: Alert, awake  Memory: intact to recent and remote events  Attention/concentration: able to attend to interview  Abstract reasoning: intact  Insight: good  Judgment: good  MSK: gait and station not observed      Significant Labs: Last 24 Hours:   Recent Lab Results  (Last 5 results in the past 24 hours)        04/21/22  0737   04/21/22  0402   04/20/22  1936   04/20/22  1542   04/20/22  1127        Albumin   4.2             Alkaline Phosphatase   107             ALT   45             Anion Gap   13             AST   42             Baso #   0.04             Basophil %   0.4             BILIRUBIN TOTAL   0.6  Comment: For infants and newborns, interpretation of results should be based  on gestational age, weight and in agreement with clinical  observations.    Premature Infant recommended reference ranges:  Up to 24 hours.............<8.0 mg/dL  Up to 48 hours............<12.0 mg/dL  3-5 days..................<15.0 mg/dL  6-29 days.................<15.0 mg/dL               BUN   34             Calcium   10.1             Chloride   95             CO2   27             Creatinine   1.8             Differential Method   Automated             eGFR if    54.4             eGFR if non    47.0  Comment: Calculation used to obtain the estimated glomerular filtration  rate (eGFR) is the CKD-EPI equation.                Eos #   0.3             Eosinophil %   2.2             Glucose   225             Gran # (ANC)   7.9             Gran %   70.3             Hematocrit   42.1             Hemoglobin   14.1             Immature Grans (Abs)   0.03  Comment: Mild elevation in immature granulocytes is non specific and   can be seen in a variety of conditions including stress response,   acute inflammation, trauma and pregnancy. Correlation with other   laboratory and clinical findings is essential.               Immature Granulocytes   0.3             Lymph #   1.9             Lymph %   16.6              Magnesium   2.0             MCH   31.1             MCHC   33.5             MCV   93             Mono #   1.1             Mono %   10.2             MPV   9.9             nRBC   0             Platelets   269             POCT Glucose 203     325   259   219       Potassium   4.1             PROTEIN TOTAL   8.4             RBC   4.53             RDW   14.5             Sodium   135             WBC   11.19                                    Significant Imaging: I have reviewed all pertinent imaging results/findings within the past 24 hours.

## 2022-04-21 NOTE — PROGRESS NOTES
[facility] - Cardiology Stepdown  Heart Transplant  Progress Note    Patient Name: Kevan Queen  MRN: [id number]  Admission Date: 4/12/2022  Hospital Length of Stay: 9 days  Attending Physician: Luca Lopez Jr.,*  Primary Care Provider: ORALIA Cline  Principal Problem:Acute on chronic combined systolic and diastolic heart failure, NYHA class 4    Subjective:     Interval History:   -VT, monomorphic x 25 seconds at 5:19 am, will interrogate with AICD, also had VT events yesterday  -psych is following, added prn vistaril for sleep  -Cr improved to 1.8 from 1.9, CVP 5 on PICC today (PICC underestimated CVP than RAP on RHC and wedge was 2.4x the RAP), will keep lasix gtt 10 and increase entresto to 49/51 today    Continuous Infusions:   furosemide (LASIX) 10 mg/mL infusion (non-titrating) 10 mg/hr (04/21/22 0113)    milrinone 20mg/100ml D5W (200mcg/ml) 0.5 mcg/kg/min (04/21/22 0744)     Scheduled Meds:   aspirin  81 mg Oral Daily    digoxin  0.125 mg Oral Daily    EScitalopram oxalate  5 mg Oral Daily    ferrous sulfate  1 tablet Oral Daily    heparin (porcine)  5,000 Units Subcutaneous Q8H    insulin detemir U-100  5 Units Subcutaneous Daily    mirtazapine  15 mg Oral Nightly    pantoprazole  40 mg Oral Daily    sacubitriL-valsartan  1 tablet Oral BID    spironolactone  25 mg Oral Daily    sucralfate  1 g Oral Nightly     PRN Meds:acetaminophen, dextrose 10%, dextrose 10%, glucagon (human recombinant), glucose, glucose, hydrOXYzine pamoate, insulin aspart U-100, LIDOcaine-EPINEPHrine 1%-1:100,000, methocarbamoL, sodium chloride 0.9%    Review of patient's allergies indicates:   Allergen Reactions    Bumex [bumetanide] Hives    Lactose Diarrhea    Torsemide Hives     Objective:     Vital Signs (Most Recent):  Temp: 96.6 °F (35.9 °C) (04/21/22 0737)  Pulse: (!) 118 (04/21/22 1146)  Resp: 18 (04/21/22 0737)  BP: (!) 114/58 (04/21/22 0737)  SpO2: 99 % (04/21/22 0737)   Vital Signs (24h  Range):  Temp:  [96.1 °F (35.6 °C)-97.7 °F (36.5 °C)] 96.6 °F (35.9 °C)  Pulse:  [] 118  Resp:  [16-20] 18  SpO2:  [95 %-99 %] 99 %  BP: ()/(58-77) 114/58     Patient Vitals for the past 72 hrs (Last 3 readings):   Weight   04/19/22 1551 93.9 kg (207 lb)     Body mass index is 25.87 kg/m².      Intake/Output Summary (Last 24 hours) at 4/21/2022 1312  Last data filed at 4/21/2022 0900  Gross per 24 hour   Intake 3021.24 ml   Output 3040 ml   Net -18.76 ml       Hemodynamic Parameters:       Telemetry: rev'd, ST with VT events    Physical Exam  HEENT: Sclera anicteric, PERRLA, EOMI  Neck: JVP at the clavicle, no masses, good movement  CV: RRR, S1 and S2 normal, no additional heart sounds or murmurs. Pulses 2+ and equal bilaterally in radial arteries, Johnathan's normal on right. Distal pulses are 2+ and equal in the femoral, DP and PT areas bilaterally  CVP 5 mmHg via PICC  Pulm: Clear to auscultation bilaterally with symmetrical expansion. Chest wall palpated for reproduction of pain symptoms, and no pain was able to be produced on palpation or resistance exercises  GI: Abdomen soft, non-tender, good bowel sounds  Extremities: Both extremities intact and grossly normal, skin is warm, PICC in right UE   Psych: AOx3, appropriate affect  Neuro: CNII-XII intact, no focal deficits  Significant Labs:  CBC:  Recent Labs   Lab 04/19/22  0430 04/20/22  0325 04/21/22  0402   WBC 6.92 7.92 11.19   RBC 4.39* 4.51* 4.53*   HGB 13.9* 14.5 14.1   HCT 41.1 42.9 42.1    263 269   MCV 94 95 93   MCH 31.7* 32.2* 31.1*   MCHC 33.8 33.8 33.5     BNP:  Recent Labs   Lab 04/18/22  1403   *     CMP:  Recent Labs   Lab 04/19/22  0430 04/20/22  0325 04/21/22  0402   *  237* 138*  138* 225*   CALCIUM 10.0  10.0 10.3  10.3 10.1   ALBUMIN 4.1 4.5 4.2   PROT 7.8 8.8* 8.4   *  134* 135*  135* 135*   K 4.0  4.0 4.1  4.1 4.1   CO2 24  24 26  26 27   CL 95  95 95  95 95   BUN 31*  31* 33*  33* 34*    CREATININE 1.8*  1.8* 1.9*  1.9* 1.8*   ALKPHOS 85 97 107   ALT 37 39 45*   AST 38 40 42*   BILITOT 0.6 0.7 0.6      Coagulation:   Recent Labs   Lab 04/19/22  0430   INR 1.1   APTT 27.6     LDH:  Recent Labs   Lab 04/18/22  1403        Microbiology:  Microbiology Results (last 7 days)       ** No results found for the last 168 hours. **            I have reviewed all pertinent labs within the past 24 hours.    Estimated Creatinine Clearance: 67.2 mL/min (A) (based on SCr of 1.8 mg/dL (H)).    Diagnostic Results:  I have reviewed and interpreted all pertinent imaging results/findings within the past 24 hours.    Assessment and Plan:     38 yo BM with NICM, on home milrinone, hx of substance abuse who comes in for f/u. States that he has been doing well since last visit with Dr Lopez.Sleeps on 2 pillows. No PND anymore. Volume/weight has been stable. Great appetite. He did have to have his PICC line replaced as it got pulled out during sleep. He got his ICD placed last month in [place].      He claims being clean of any drugs, alcoho or tobacco for a long time. He came with his fiance that he has been living with for the last 8 years. They have 2 children in common. He has a total of 9 kids. Tox screen done 2/4/22 was -ve, but nicotine/cotinine screen was +ve same day. He admits to occasional cigarette smoking.      Underwent risk assessment with an echo and RHC on 4/8, both done on Milrinone 0.25 mcg/kg/min:    TTE: LVEDD 7, L:VEF 10%, grade 3 diastolic dysfunction, RV severely enlarge and mod to severely depressed, mod to severe MR, mild TR, PAS > 50 and IVC 8    RHC: RA 15, PAP 59/35 (43), W 33 and CO/CI 3.27/1.47.    Current GDMT: Toprol 25 mg qd, Entresto 24-26 bid, Aldactone 12.5 mg qd. Takes Lasix 80 mg bid plus Metolazone 2.5 mg qod    He presents as a direct admit with AHD and for advanced options consideration            * Acute on chronic combined systolic and diastolic heart failure, NYHA  class 4  NIDCM , on home Milrinone, S/P ICD, h/o polysubstance abuse, tox screen 4/8 -ve, admitted with ADHF and for consideration for advanced options w/u:    -RHC on Milrinone 0.25 mcg on 4/8: RA 15, PA 59/35 (43), W 33 and CO/CI 3.27/1.47  -TTE on Milrinone 0.25 mcg on 4/8: LVEDD 7, LVEF 10%, grade 3 diastolic dysfunction, RV severely dilated, mod to severely depressed, mod-severe MR, mild TR, PAS > 50 and IVC 8  - Admit labs showed sCr 1.7 (baseline of ~ 1.6)  - Increased Milrinone to 0.375 mcg/kg/min on admit. Milrinone then increased to 0.5 mcg/kg/min overnight 2/2 drop in sVO2. sVO2 has since improved to 57 with CO/CI 5.05/2.23 and SVR 1124  - GDMT; Hold Toprol given decompensation. Continue Entresto and Aldactone  - Step 3 of VAD only pathway despite favorable blood type (A) (still smokes)    RHC 4/19 on 0.5 gtt milrinone   · RA 11 PA 50/27 (35) PCWP 27 CO 3.7 l/min CI 1.7 l/min/m2.  · PVR 2.2 CHRISTENSEN    Plan  -started IV diuresis on 4/19 after RHC, on IV lasix gtt 10, keep milrinone gtt 0.5, need to decrease filling pressures with diuresis to improve output  -K>4, Mg>2, tele  -on entresto 24/26 bid for unloading, increased to 49/51 4/21           Tobacco use  - Continues to smoke occasionally.     ROC (acute kidney injury)  Cr 1.8 currently, baseline around 1.4  Continue volume management per hemodynamcis    Anxiety disorder, unspecified  Psych consulted 4/20  Started lexapro 5 daily and prn vistarel per psych  Keep Remeron 15 daily         Jan Pruitt MD  Heart Transplant  [facility] - Cardiology Stepdown

## 2022-04-21 NOTE — SUBJECTIVE & OBJECTIVE
Interval History:   -VT, monomorphic x 25 seconds at 5:19 am, will interrogate with AICD, also had VT events yesterday  -psych is following, added prn vistaril for sleep  -Cr improved to 1.8 from 1.9, CVP 5 on PICC today (PICC underestimated CVP than RAP on RHC and wedge was 2.4x the RAP), will keep lasix gtt 10 and increase entresto to 49/51 today    Continuous Infusions:   furosemide (LASIX) 10 mg/mL infusion (non-titrating) 10 mg/hr (04/21/22 0113)    milrinone 20mg/100ml D5W (200mcg/ml) 0.5 mcg/kg/min (04/21/22 0744)     Scheduled Meds:   aspirin  81 mg Oral Daily    digoxin  0.125 mg Oral Daily    EScitalopram oxalate  5 mg Oral Daily    ferrous sulfate  1 tablet Oral Daily    heparin (porcine)  5,000 Units Subcutaneous Q8H    insulin detemir U-100  5 Units Subcutaneous Daily    mirtazapine  15 mg Oral Nightly    pantoprazole  40 mg Oral Daily    sacubitriL-valsartan  1 tablet Oral BID    spironolactone  25 mg Oral Daily    sucralfate  1 g Oral Nightly     PRN Meds:acetaminophen, dextrose 10%, dextrose 10%, glucagon (human recombinant), glucose, glucose, hydrOXYzine pamoate, insulin aspart U-100, LIDOcaine-EPINEPHrine 1%-1:100,000, methocarbamoL, sodium chloride 0.9%    Review of patient's allergies indicates:   Allergen Reactions    Bumex [bumetanide] Hives    Lactose Diarrhea    Torsemide Hives     Objective:     Vital Signs (Most Recent):  Temp: 96.6 °F (35.9 °C) (04/21/22 0737)  Pulse: (!) 118 (04/21/22 1146)  Resp: 18 (04/21/22 0737)  BP: (!) 114/58 (04/21/22 0737)  SpO2: 99 % (04/21/22 0737)   Vital Signs (24h Range):  Temp:  [96.1 °F (35.6 °C)-97.7 °F (36.5 °C)] 96.6 °F (35.9 °C)  Pulse:  [] 118  Resp:  [16-20] 18  SpO2:  [95 %-99 %] 99 %  BP: ()/(58-77) 114/58     Patient Vitals for the past 72 hrs (Last 3 readings):   Weight   04/19/22 1551 93.9 kg (207 lb)     Body mass index is 25.87 kg/m².      Intake/Output Summary (Last 24 hours) at 4/21/2022 1312  Last data filed at 4/21/2022  0900  Gross per 24 hour   Intake 3021.24 ml   Output 3040 ml   Net -18.76 ml       Hemodynamic Parameters:       Telemetry: rev'd, ST with VT events    Physical Exam  HEENT: Sclera anicteric, PERRLA, EOMI  Neck: JVP at the clavicle, no masses, good movement  CV: RRR, S1 and S2 normal, no additional heart sounds or murmurs. Pulses 2+ and equal bilaterally in radial arteries, Johnathan's normal on right. Distal pulses are 2+ and equal in the femoral, DP and PT areas bilaterally  CVP 5 mmHg via PICC  Pulm: Clear to auscultation bilaterally with symmetrical expansion. Chest wall palpated for reproduction of pain symptoms, and no pain was able to be produced on palpation or resistance exercises  GI: Abdomen soft, non-tender, good bowel sounds  Extremities: Both extremities intact and grossly normal, skin is warm, PICC in right UE   Psych: AOx3, appropriate affect  Neuro: CNII-XII intact, no focal deficits  Significant Labs:  CBC:  Recent Labs   Lab 04/19/22  0430 04/20/22  0325 04/21/22  0402   WBC 6.92 7.92 11.19   RBC 4.39* 4.51* 4.53*   HGB 13.9* 14.5 14.1   HCT 41.1 42.9 42.1    263 269   MCV 94 95 93   MCH 31.7* 32.2* 31.1*   MCHC 33.8 33.8 33.5     BNP:  Recent Labs   Lab 04/18/22  1403   *     CMP:  Recent Labs   Lab 04/19/22  0430 04/20/22  0325 04/21/22  0402   *  237* 138*  138* 225*   CALCIUM 10.0  10.0 10.3  10.3 10.1   ALBUMIN 4.1 4.5 4.2   PROT 7.8 8.8* 8.4   *  134* 135*  135* 135*   K 4.0  4.0 4.1  4.1 4.1   CO2 24  24 26  26 27   CL 95  95 95  95 95   BUN 31*  31* 33*  33* 34*   CREATININE 1.8*  1.8* 1.9*  1.9* 1.8*   ALKPHOS 85 97 107   ALT 37 39 45*   AST 38 40 42*   BILITOT 0.6 0.7 0.6      Coagulation:   Recent Labs   Lab 04/19/22  0430   INR 1.1   APTT 27.6     LDH:  Recent Labs   Lab 04/18/22  1403        Microbiology:  Microbiology Results (last 7 days)       ** No results found for the last 168 hours. **            I have reviewed all pertinent labs  within the past 24 hours.    Estimated Creatinine Clearance: 67.2 mL/min (A) (based on SCr of 1.8 mg/dL (H)).    Diagnostic Results:  I have reviewed and interpreted all pertinent imaging results/findings within the past 24 hours.

## 2022-04-21 NOTE — PROGRESS NOTES
Interdisciplinary Rounds Report:   Attended interdisciplinary rounds with the [facility]/CTS services including the LVAD Coordinators, social workers, cardiologists, surgeons,  PT/OT/Speech, dietician, and unit charge nurses. Discussed patient status, plan of care, goals of care, including DC date, and post discharge needs. Plan of care will be discussed with the patient and/or family per the physician while rounding on the floor. This is a recurring meeting that is medically and socially necessary to collaborate with the interdisciplinary team to assist patient needs and safe discharge.

## 2022-04-21 NOTE — PROGRESS NOTES
Attempted to visit patient at bedside. Patient and girlfriend ERIC for a walk. Spoke with bedside RN, if patient has any questions, informed RN to to either call me or inform the patient I will come back at another time to visit. She verbalized understanding.

## 2022-04-21 NOTE — PLAN OF CARE
Plan of care reviewed with patient and significant other at bedside.  Patient ambulating independently, VSS. Patient does report some anxiety and difficulty sleeping, psych following. Pt had a few episodes of VT, providers aware, ICD interrogated at bedside. Milrinone and Lasix drips infusing as ordered. Strict intake/output record. Questions encouraged and answered.  Problem: Adult Inpatient Plan of Care  Goal: Plan of Care Review  Outcome: Ongoing, Progressing  Goal: Patient-Specific Goal (Individualized)  Outcome: Ongoing, Progressing  Goal: Absence of Hospital-Acquired Illness or Injury  Outcome: Ongoing, Progressing  Goal: Optimal Comfort and Wellbeing  Outcome: Ongoing, Progressing  Goal: Readiness for Transition of Care  Outcome: Ongoing, Progressing     Problem: Infection  Goal: Absence of Infection Signs and Symptoms  Outcome: Ongoing, Progressing     Problem: Adjustment to Illness (Heart Failure)  Goal: Optimal Coping  Outcome: Ongoing, Progressing     Problem: Cardiac Output Decreased (Heart Failure)  Goal: Optimal Cardiac Output  Outcome: Ongoing, Progressing     Problem: Dysrhythmia (Heart Failure)  Goal: Stable Heart Rate and Rhythm  Outcome: Ongoing, Progressing     Problem: Fluid Imbalance (Heart Failure)  Goal: Fluid Balance  Outcome: Ongoing, Progressing     Problem: Functional Ability Impaired (Heart Failure)  Goal: Optimal Functional Ability  Outcome: Ongoing, Progressing     Problem: Oral Intake Inadequate (Heart Failure)  Goal: Optimal Nutrition Intake  Outcome: Ongoing, Progressing     Problem: Respiratory Compromise (Heart Failure)  Goal: Effective Oxygenation and Ventilation  Outcome: Ongoing, Progressing     Problem: Sleep Disordered Breathing (Heart Failure)  Goal: Effective Breathing Pattern During Sleep  Outcome: Ongoing, Progressing     Problem: Cardiac Output Decreased  Goal: Effective Cardiac Output  Outcome: Ongoing, Progressing     Problem: Fluid and Electrolyte Imbalance (Acute  Kidney Injury/Impairment)  Goal: Fluid and Electrolyte Balance  Outcome: Ongoing, Progressing     Problem: Oral Intake Inadequate (Acute Kidney Injury/Impairment)  Goal: Optimal Nutrition Intake  Outcome: Ongoing, Progressing     Problem: Renal Function Impairment (Acute Kidney Injury/Impairment)  Goal: Effective Renal Function  Outcome: Ongoing, Progressing     Problem: Fall Injury Risk  Goal: Absence of Fall and Fall-Related Injury  Outcome: Ongoing, Progressing

## 2022-04-21 NOTE — CARE UPDATE
EP Update:    Contacted by [facility] to review patient's telemetry for possible VT. Pt admitted for ADHF/cardiogenic shock and being worked up for LVAD.    Review of telemetry shows a 30-40 sec episode of a wide complex tachycardia very similar to the same rate of the patient's sinus tachycardia. This is more than likely SVT with aberrancy rather than VT (does not meet Brugada criteria for VT). BS single chamber ICD interrogated and unable to detect episode as well. Current settings are VVI 40 bpm,  bpm and  bpm.     Would not recommend dropping monitoring zone and would keep current settings. Dropping monitor zone would capture patient's sinus tachycardia and would not provide any additional benefit. Would also not adjust patient's discriminators for the same reasons. Patient has ICD in place if develops VT. Keep K>4, Mag>2 and would try to obtain 12 lead EKG if this arrhythmia recurs.     Favian Hart MD PGY4  Cardiovascular Medicine Fellow  [facility]  Pager: [phone]

## 2022-04-21 NOTE — ASSESSMENT & PLAN NOTE
ASSESSMENT     Kevan Queen is a 37 y.o. male with a past psychiatric history of anxiety, depression, currently presenting with acute on chronic combined systolic and diastolic heart failure, NYHA class 4.  Psychiatry was originally consulted to address the patient's symptoms of anxiety and depression, currently being treated with remeron 15 mg nightly and buspar 7.5 mg BID. No prior psychiatric treatment. Pt will require referral to mental health services outpatient upon discharge.     IMPRESSION  Unspecified Anxiety Disorder  Unspecified Depressive Disorder  R/o PTSD    RECOMMENDATION(S)      1. Scheduled Medication(s):  - Continue Remeron 15 mg nightly for depression/insomnia  - Continue Lexapro 5 mg daily for anxiety/depression  - Discontinue buspar 7.5 mg BID    2. PRN Medication(s):  - Initiate PRN vistaril 25 mg qhs for insomnia    3. Legal Status/Precaution(s):  Patient does not meet criteria for PEC or inpatient psychiatric admission at this time. Patient is not currently an imminent danger to self or others and is not gravely disabled due to a psychiatric illness.    4. Other:   -Pt would benefit from referral to outpatient psychiatry and therapy upon discharge. Referral may be placed, or pt may call  at [phone] closer to discharge to schedule an appointment.   - Pt would also benefit from psychology services while in the hospital, consult placed.     Psychiatry will sign off at this time. Please contact on-call [facility] psychiatry provider for any acute questions.

## 2022-04-21 NOTE — ASSESSMENT & PLAN NOTE
NIDCM , on home Milrinone, S/P ICD, h/o polysubstance abuse, tox screen 4/8 -ve, admitted with ADHF and for consideration for advanced options w/u:    -RHC on Milrinone 0.25 mcg on 4/8: RA 15, PA 59/35 (43), W 33 and CO/CI 3.27/1.47  -TTE on Milrinone 0.25 mcg on 4/8: LVEDD 7, LVEF 10%, grade 3 diastolic dysfunction, RV severely dilated, mod to severely depressed, mod-severe MR, mild TR, PAS > 50 and IVC 8  - Admit labs showed sCr 1.7 (baseline of ~ 1.6)  - Increased Milrinone to 0.375 mcg/kg/min on admit. Milrinone then increased to 0.5 mcg/kg/min overnight 2/2 drop in sVO2. sVO2 has since improved to 57 with CO/CI 5.05/2.23 and SVR 1124  - GDMT; Hold Toprol given decompensation. Continue Entresto and Aldactone  - Step 3 of VAD only pathway despite favorable blood type (A) (still smokes)    RHC 4/19 on 0.5 gtt milrinone   · RA 11 PA 50/27 (35) PCWP 27 CO 3.7 l/min CI 1.7 l/min/m2.  · PVR 2.2 CHRISTENSEN    Plan  -started IV diuresis on 4/19 after RHC, on IV lasix gtt 10, keep milrinone gtt 0.5, need to decrease filling pressures with diuresis to improve output  -K>4, Mg>2, tele  -on entresto 24/26 bid for unloading, increased to 49/51 4/21

## 2022-04-22 LAB
ALBUMIN SERPL BCP-MCNC: 4.3 G/DL (ref 3.5–5.2)
ALBUMIN SERPL BCP-MCNC: 4.4 G/DL (ref 3.5–5.2)
ALP SERPL-CCNC: 92 U/L (ref 55–135)
ALP SERPL-CCNC: 94 U/L (ref 55–135)
ALT SERPL W/O P-5'-P-CCNC: 47 U/L (ref 10–44)
ALT SERPL W/O P-5'-P-CCNC: 47 U/L (ref 10–44)
ANION GAP SERPL CALC-SCNC: 10 MMOL/L (ref 8–16)
ANION GAP SERPL CALC-SCNC: 14 MMOL/L (ref 8–16)
APTT IMM NP PPP: ABNORMAL SEC (ref 32–48)
APTT P HEP NEUT PPP: ABNORMAL SEC (ref 32–48)
AST SERPL-CCNC: 43 U/L (ref 10–40)
AST SERPL-CCNC: 45 U/L (ref 10–40)
BASOPHILS # BLD AUTO: 0.03 K/UL (ref 0–0.2)
BASOPHILS NFR BLD: 0.4 % (ref 0–1.9)
BILIRUB SERPL-MCNC: 0.6 MG/DL (ref 0.1–1)
BILIRUB SERPL-MCNC: 0.6 MG/DL (ref 0.1–1)
BUN SERPL-MCNC: 34 MG/DL (ref 6–20)
BUN SERPL-MCNC: 34 MG/DL (ref 6–20)
CALCIUM SERPL-MCNC: 10.2 MG/DL (ref 8.7–10.5)
CALCIUM SERPL-MCNC: 10.4 MG/DL (ref 8.7–10.5)
CHLORIDE SERPL-SCNC: 95 MMOL/L (ref 95–110)
CHLORIDE SERPL-SCNC: 96 MMOL/L (ref 95–110)
CO2 SERPL-SCNC: 25 MMOL/L (ref 23–29)
CO2 SERPL-SCNC: 28 MMOL/L (ref 23–29)
CONFIRM APTT STACLOT: ABNORMAL
COTININE SERPL-MCNC: 6.3 NG/ML
CREAT SERPL-MCNC: 1.6 MG/DL (ref 0.5–1.4)
CREAT SERPL-MCNC: 1.7 MG/DL (ref 0.5–1.4)
DIFFERENTIAL METHOD: NORMAL
DRVVT SCREEN TO CONFIRM RATIO: ABNORMAL RATIO
EOSINOPHIL # BLD AUTO: 0.2 K/UL (ref 0–0.5)
EOSINOPHIL NFR BLD: 2.2 % (ref 0–8)
ERYTHROCYTE [DISTWIDTH] IN BLOOD BY AUTOMATED COUNT: 14.1 % (ref 11.5–14.5)
EST. GFR  (AFRICAN AMERICAN): 58.3 ML/MIN/1.73 M^2
EST. GFR  (AFRICAN AMERICAN): >60 ML/MIN/1.73 M^2
EST. GFR  (NON AFRICAN AMERICAN): 50.4 ML/MIN/1.73 M^2
EST. GFR  (NON AFRICAN AMERICAN): 54.2 ML/MIN/1.73 M^2
F2 C.20210G>A GENO BLD/T: NEGATIVE
F5 P.R506Q BLD/T QL: NEGATIVE
GLUCOSE SERPL-MCNC: 167 MG/DL (ref 70–110)
GLUCOSE SERPL-MCNC: 172 MG/DL (ref 70–110)
HCT VFR BLD AUTO: 44.1 % (ref 40–54)
HGB BLD-MCNC: 14.6 G/DL (ref 14–18)
IMM GRANULOCYTES # BLD AUTO: 0.02 K/UL (ref 0–0.04)
IMM GRANULOCYTES NFR BLD AUTO: 0.2 % (ref 0–0.5)
LA 3 SCREEN W REFLEX-IMP: ABNORMAL
LA NT DPL PPP QL: ABNORMAL
LYMPHOCYTES # BLD AUTO: 2.3 K/UL (ref 1–4.8)
LYMPHOCYTES NFR BLD: 28.4 % (ref 18–48)
MAGNESIUM SERPL-MCNC: 2.2 MG/DL (ref 1.6–2.6)
MAGNESIUM SERPL-MCNC: 2.2 MG/DL (ref 1.6–2.6)
MCH RBC QN AUTO: 30.9 PG (ref 27–31)
MCHC RBC AUTO-ENTMCNC: 33.1 G/DL (ref 32–36)
MCV RBC AUTO: 93 FL (ref 82–98)
MIXING DRVVT: ABNORMAL SEC (ref 33–44)
MONOCYTES # BLD AUTO: 1 K/UL (ref 0.3–1)
MONOCYTES NFR BLD: 12 % (ref 4–15)
NEUTROPHILS # BLD AUTO: 4.6 K/UL (ref 1.8–7.7)
NEUTROPHILS NFR BLD: 56.8 % (ref 38–73)
NICOTINE SERPL-MCNC: <3 NG/ML
NRBC BLD-RTO: 0 /100 WBC
PHOSPHATE SERPL-MCNC: 3.6 MG/DL (ref 2.7–4.5)
PLATELET # BLD AUTO: 267 K/UL (ref 150–450)
PMV BLD AUTO: 10.1 FL (ref 9.2–12.9)
POCT GLUCOSE: 136 MG/DL (ref 70–110)
POCT GLUCOSE: 142 MG/DL (ref 70–110)
POCT GLUCOSE: 189 MG/DL (ref 70–110)
POCT GLUCOSE: 211 MG/DL (ref 70–110)
POTASSIUM SERPL-SCNC: 4 MMOL/L (ref 3.5–5.1)
POTASSIUM SERPL-SCNC: 4.1 MMOL/L (ref 3.5–5.1)
PROT C AG ACT/NOR PPP IA: >95 % (ref 63–153)
PROT S FREE AG ACT/NOR PPP IA: 105 % (ref 57–171)
PROT SERPL-MCNC: 8.4 G/DL (ref 6–8.4)
PROT SERPL-MCNC: 8.5 G/DL (ref 6–8.4)
PROTHROMBIN TIME: 12.4 SEC (ref 12–15.5)
RBC # BLD AUTO: 4.72 M/UL (ref 4.6–6.2)
REPTILASE TIME: ABNORMAL SEC
SCREEN APTT: 36 SEC (ref 32–48)
SCREEN DRVVT: 29 SEC (ref 33–44)
SODIUM SERPL-SCNC: 134 MMOL/L (ref 136–145)
SODIUM SERPL-SCNC: 134 MMOL/L (ref 136–145)
THROMBIN TIME: ABNORMAL SEC (ref 14.7–19.5)
WBC # BLD AUTO: 8.17 K/UL (ref 3.9–12.7)

## 2022-04-22 PROCEDURE — 99233 PR SUBSEQUENT HOSPITAL CARE,LEVL III: ICD-10-PCS | Mod: NTX,,, | Performed by: INTERNAL MEDICINE

## 2022-04-22 PROCEDURE — 63600175 PHARM REV CODE 636 W HCPCS: Mod: NTX | Performed by: INTERNAL MEDICINE

## 2022-04-22 PROCEDURE — 25000003 PHARM REV CODE 250: Mod: NTX | Performed by: INTERNAL MEDICINE

## 2022-04-22 PROCEDURE — 99233 SBSQ HOSP IP/OBS HIGH 50: CPT | Mod: NTX,,, | Performed by: INTERNAL MEDICINE

## 2022-04-22 PROCEDURE — 84100 ASSAY OF PHOSPHORUS: CPT | Mod: NTX | Performed by: INTERNAL MEDICINE

## 2022-04-22 PROCEDURE — 63600175 PHARM REV CODE 636 W HCPCS: Mod: NTX | Performed by: STUDENT IN AN ORGANIZED HEALTH CARE EDUCATION/TRAINING PROGRAM

## 2022-04-22 PROCEDURE — 36415 COLL VENOUS BLD VENIPUNCTURE: CPT | Mod: NTX | Performed by: INTERNAL MEDICINE

## 2022-04-22 PROCEDURE — 80053 COMPREHEN METABOLIC PANEL: CPT | Mod: NTX | Performed by: INTERNAL MEDICINE

## 2022-04-22 PROCEDURE — 90832 PSYTX W PT 30 MINUTES: CPT | Mod: AF,HB,NTX, | Performed by: STUDENT IN AN ORGANIZED HEALTH CARE EDUCATION/TRAINING PROGRAM

## 2022-04-22 PROCEDURE — 80053 COMPREHEN METABOLIC PANEL: CPT | Mod: 91,NTX | Performed by: NURSE PRACTITIONER

## 2022-04-22 PROCEDURE — 94761 N-INVAS EAR/PLS OXIMETRY MLT: CPT | Mod: NTX

## 2022-04-22 PROCEDURE — 83735 ASSAY OF MAGNESIUM: CPT | Mod: 91,NTX | Performed by: NURSE PRACTITIONER

## 2022-04-22 PROCEDURE — 25000003 PHARM REV CODE 250: Mod: NTX | Performed by: NURSE PRACTITIONER

## 2022-04-22 PROCEDURE — 85025 COMPLETE CBC W/AUTO DIFF WBC: CPT | Mod: NTX | Performed by: NURSE PRACTITIONER

## 2022-04-22 PROCEDURE — 83735 ASSAY OF MAGNESIUM: CPT | Mod: NTX | Performed by: INTERNAL MEDICINE

## 2022-04-22 PROCEDURE — 20600001 HC STEP DOWN PRIVATE ROOM: Mod: NTX

## 2022-04-22 PROCEDURE — 63600175 PHARM REV CODE 636 W HCPCS: Mod: NTX | Performed by: NURSE PRACTITIONER

## 2022-04-22 PROCEDURE — 90832 PR PSYCHOTHERAPY W/PATIENT, 30 MIN: ICD-10-PCS | Mod: AF,HB,NTX, | Performed by: STUDENT IN AN ORGANIZED HEALTH CARE EDUCATION/TRAINING PROGRAM

## 2022-04-22 PROCEDURE — C9399 UNCLASSIFIED DRUGS OR BIOLOG: HCPCS | Mod: NTX | Performed by: INTERNAL MEDICINE

## 2022-04-22 RX ORDER — ENOXAPARIN SODIUM 100 MG/ML
40 INJECTION SUBCUTANEOUS EVERY 24 HOURS
Status: DISCONTINUED | OUTPATIENT
Start: 2022-04-23 | End: 2022-04-25 | Stop reason: HOSPADM

## 2022-04-22 RX ADMIN — DIGOXIN 0.12 MG: 125 TABLET ORAL at 09:04

## 2022-04-22 RX ADMIN — FUROSEMIDE 10 MG/HR: 10 INJECTION, SOLUTION INTRAMUSCULAR; INTRAVENOUS at 02:04

## 2022-04-22 RX ADMIN — HEPARIN SODIUM 5000 UNITS: 5000 INJECTION INTRAVENOUS; SUBCUTANEOUS at 10:04

## 2022-04-22 RX ADMIN — PANTOPRAZOLE SODIUM 40 MG: 40 TABLET, DELAYED RELEASE ORAL at 09:04

## 2022-04-22 RX ADMIN — FERROUS SULFATE TAB 325 MG (65 MG ELEMENTAL FE) 1 EACH: 325 (65 FE) TAB at 09:04

## 2022-04-22 RX ADMIN — MILRINONE LACTATE IN DEXTROSE 0.5 MCG/KG/MIN: 200 INJECTION, SOLUTION INTRAVENOUS at 07:04

## 2022-04-22 RX ADMIN — INSULIN DETEMIR 5 UNITS: 100 INJECTION, SOLUTION SUBCUTANEOUS at 09:04

## 2022-04-22 RX ADMIN — MILRINONE LACTATE IN DEXTROSE 0.5 MCG/KG/MIN: 200 INJECTION, SOLUTION INTRAVENOUS at 02:04

## 2022-04-22 RX ADMIN — HEPARIN SODIUM 5000 UNITS: 5000 INJECTION INTRAVENOUS; SUBCUTANEOUS at 01:04

## 2022-04-22 RX ADMIN — SACUBITRIL AND VALSARTAN 1 TABLET: 49; 51 TABLET, FILM COATED ORAL at 09:04

## 2022-04-22 RX ADMIN — ESCITALOPRAM OXALATE 5 MG: 5 TABLET, FILM COATED ORAL at 09:04

## 2022-04-22 RX ADMIN — MILRINONE LACTATE IN DEXTROSE 0.5 MCG/KG/MIN: 200 INJECTION, SOLUTION INTRAVENOUS at 08:04

## 2022-04-22 RX ADMIN — HEPARIN SODIUM 5000 UNITS: 5000 INJECTION INTRAVENOUS; SUBCUTANEOUS at 06:04

## 2022-04-22 RX ADMIN — INSULIN ASPART 2 UNITS: 100 INJECTION, SOLUTION INTRAVENOUS; SUBCUTANEOUS at 04:04

## 2022-04-22 RX ADMIN — MIRTAZAPINE 15 MG: 15 TABLET, FILM COATED ORAL at 08:04

## 2022-04-22 RX ADMIN — ASPIRIN 81 MG CHEWABLE TABLET 81 MG: 81 TABLET CHEWABLE at 09:04

## 2022-04-22 RX ADMIN — SACUBITRIL AND VALSARTAN 1 TABLET: 49; 51 TABLET, FILM COATED ORAL at 08:04

## 2022-04-22 RX ADMIN — SUCRALFATE 1 G: 1 TABLET ORAL at 08:04

## 2022-04-22 RX ADMIN — SPIRONOLACTONE 25 MG: 25 TABLET, FILM COATED ORAL at 09:04

## 2022-04-22 NOTE — PROGRESS NOTES
[facility] - Cardiology Stepdown  Heart Transplant  Progress Note    Patient Name: Kevan Queen  MRN: [id number]  Admission Date: 4/12/2022  Hospital Length of Stay: 10 days  Attending Physician: Luca Lopez Jr.,*  Primary Care Provider: ORALIA Cline  Principal Problem:Acute on chronic combined systolic and diastolic heart failure, NYHA class 4    Subjective:     Interval History:   -CVP 5 via PICC, JVP 8 of water on exam, continue lasix gtt 10 and milrinone gtt 0.5  -entresto was upped to 49/51 yesterday and Cr improved today  -he is doing well with no complaints  -ancticipate d/c next week on home inotropes, already approved for BTT VAD pending his staying away from smoking (quit 4/2022)    Continuous Infusions:   furosemide (LASIX) 10 mg/mL infusion (non-titrating) 10 mg/hr (04/22/22 1402)    milrinone 20mg/100ml D5W (200mcg/ml) 0.5 mcg/kg/min (04/22/22 1400)     Scheduled Meds:   aspirin  81 mg Oral Daily    digoxin  0.125 mg Oral Daily    EScitalopram oxalate  5 mg Oral Daily    ferrous sulfate  1 tablet Oral Daily    heparin (porcine)  5,000 Units Subcutaneous Q8H    insulin detemir U-100  5 Units Subcutaneous Daily    mirtazapine  15 mg Oral Nightly    pantoprazole  40 mg Oral Daily    sacubitriL-valsartan  1 tablet Oral BID    spironolactone  25 mg Oral Daily    sucralfate  1 g Oral Nightly     PRN Meds:acetaminophen, dextrose 10%, dextrose 10%, glucagon (human recombinant), glucose, glucose, hydrOXYzine pamoate, insulin aspart U-100, LIDOcaine-EPINEPHrine 1%-1:100,000, methocarbamoL, sodium chloride 0.9%    Review of patient's allergies indicates:   Allergen Reactions    Bumex [bumetanide] Hives    Lactose Diarrhea    Torsemide Hives     Objective:     Vital Signs (Most Recent):  Temp: 97 °F (36.1 °C) (04/22/22 1135)  Pulse: 105 (04/22/22 1135)  Resp: 20 (04/22/22 1135)  BP: 105/61 (04/22/22 1135)  SpO2: 97 % (04/22/22 1135) Vital Signs (24h Range):  Temp:  [96.6 °F (35.9  °C)-98.3 °F (36.8 °C)] 97 °F (36.1 °C)  Pulse:  [] 105  Resp:  [16-20] 20  SpO2:  [96 %-100 %] 97 %  BP: ()/(50-66) 105/61     Patient Vitals for the past 72 hrs (Last 3 readings):   Weight   04/22/22 0805 93 kg (205 lb 0.4 oz)   04/19/22 1551 93.9 kg (207 lb)     Body mass index is 25.63 kg/m².      Intake/Output Summary (Last 24 hours) at 4/22/2022 1413  Last data filed at 4/22/2022 1400  Gross per 24 hour   Intake 904.21 ml   Output 3025 ml   Net -2120.79 ml       Hemodynamic Parameters:  CVP:  [5 mmHg] 5 mmHg    Telemetry: rev'd, ST     Physical Exam  HEENT: Sclera anicteric, PERRLA, EOMI  Neck: JVP at the clavicle, no masses, good movement  CV: RRR, S1 and S2 normal, no additional heart sounds or murmurs. Pulses 2+ and equal bilaterally in radial arteries, Johnathan's normal on right. Distal pulses are 2+ and equal in the femoral, DP and PT areas bilaterally  CVP 5 mmHg via PICC  Pulm: Clear to auscultation bilaterally with symmetrical expansion. Chest wall palpated for reproduction of pain symptoms, and no pain was able to be produced on palpation or resistance exercises  GI: Abdomen soft, non-tender, good bowel sounds  Extremities: Both extremities intact and grossly normal, skin is warm, PICC in right UE   Psych: AOx3, appropriate affect  Neuro: CNII-XII intact, no focal deficits  Significant Labs:  CBC:  Recent Labs   Lab 04/20/22  0325 04/21/22  0402 04/22/22  0355   WBC 7.92 11.19 8.17   RBC 4.51* 4.53* 4.72   HGB 14.5 14.1 14.6   HCT 42.9 42.1 44.1    269 267   MCV 95 93 93   MCH 32.2* 31.1* 30.9   MCHC 33.8 33.5 33.1     BNP:  Recent Labs   Lab 04/18/22  1403   *     CMP:  Recent Labs   Lab 04/20/22  0325 04/21/22  0402 04/22/22  0355   *  138* 225* 172*  167*   CALCIUM 10.3  10.3 10.1 10.2  10.4   ALBUMIN 4.5 4.2 4.4  4.3   PROT 8.8* 8.4 8.5*  8.4   *  135* 135* 134*  134*   K 4.1  4.1 4.1 4.0  4.1   CO2 26  26 27 28  25   CL 95  95 95 96  95   BUN 33*   33* 34* 34*  34*   CREATININE 1.9*  1.9* 1.8* 1.7*  1.6*   ALKPHOS 97 107 94  92   ALT 39 45* 47*  47*   AST 40 42* 45*  43*   BILITOT 0.7 0.6 0.6  0.6      Coagulation:   Recent Labs   Lab 04/19/22  0430   INR 1.1   APTT 27.6     LDH:  No results for input(s): LDH in the last 72 hours.  Microbiology:  Microbiology Results (last 7 days)       ** No results found for the last 168 hours. **            I have reviewed all pertinent labs within the past 24 hours.    Estimated Creatinine Clearance: 75.6 mL/min (A) (based on SCr of 1.6 mg/dL (H)).    Diagnostic Results:  I have reviewed all pertinent imaging results/findings within the past 24 hours.    Assessment and Plan:     36 yo BM with NICM, on home milrinone, hx of substance abuse who comes in for f/u. States that he has been doing well since last visit with Dr Lopez.Sleeps on 2 pillows. No PND anymore. Volume/weight has been stable. Great appetite. He did have to have his PICC line replaced as it got pulled out during sleep. He got his ICD placed last month in [place].      He claims being clean of any drugs, alcoho or tobacco for a long time. He came with his fiance that he has been living with for the last 8 years. They have 2 children in common. He has a total of 9 kids. Tox screen done 2/4/22 was -ve, but nicotine/cotinine screen was +ve same day. He admits to occasional cigarette smoking.      Underwent risk assessment with an echo and RHC on 4/8, both done on Milrinone 0.25 mcg/kg/min:    TTE: LVEDD 7, L:VEF 10%, grade 3 diastolic dysfunction, RV severely enlarge and mod to severely depressed, mod to severe MR, mild TR, PAS > 50 and IVC 8    RHC: RA 15, PAP 59/35 (43), W 33 and CO/CI 3.27/1.47.    Current GDMT: Toprol 25 mg qd, Entresto 24-26 bid, Aldactone 12.5 mg qd. Takes Lasix 80 mg bid plus Metolazone 2.5 mg qod    He presents as a direct admit with AHD and for advanced options consideration            * Acute on chronic combined systolic  and diastolic heart failure, NYHA class 4  [facility] , on home Milrinone, S/P ICD, h/o polysubstance abuse, tox screen 4/8 -ve, admitted with ADHF and for consideration for advanced options w/u:    -RHC on Milrinone 0.25 mcg on 4/8: RA 15, PA 59/35 (43), W 33 and CO/CI 3.27/1.47  -TTE on Milrinone 0.25 mcg on 4/8: LVEDD 7, LVEF 10%, grade 3 diastolic dysfunction, RV severely dilated, mod to severely depressed, mod-severe MR, mild TR, PAS > 50 and IVC 8  - Admit labs showed sCr 1.7 (baseline of ~ 1.6)  - Increased Milrinone to 0.375 mcg/kg/min on admit. Milrinone then increased to 0.5 mcg/kg/min overnight 2/2 drop in sVO2. sVO2 has since improved to 57 with CO/CI 5.05/2.23 and SVR 1124  - GDMT; Hold Toprol given decompensation. Continue Entresto and Aldactone  - Step 3 of VAD only pathway despite favorable blood type (A) (still smokes)    RHC 4/19 on 0.5 gtt milrinone   · RA 11 PA 50/27 (35) PCWP 27 CO 3.7 l/min CI 1.7 l/min/m2.  · PVR 2.2 CHRISTENSEN    Plan  -started IV diuresis on 4/19 after RHC, on IV lasix gtt 10, keep milrinone gtt 0.5, need to decrease filling pressures with diuresis to improve output  -K>4, Mg>2, tele  -on entresto 24/26 bid for unloading, increased to 49/51 4/21           Tobacco use  - Continues to smoke occasionally.     ROC (acute kidney injury)  Cr 1.6 currently and improving, baseline around 1.4  Continue volume management per hemodynamcis    Anxiety disorder, unspecified  Psych consulted 4/20  Started lexapro 5 daily and prn vistarel per psych  Keep Remeron 15 daily         Jan Pruitt MD  Heart Transplant  [facility] - Cardiology Stepdown

## 2022-04-22 NOTE — NURSING
0220 tele vtach 10 seconds, md contacted, stat labs drawn CMP, MG, PHOS; wnl. Vs taken 112/50, hr 97. Pt denies chest pain or discomfort. All pt needs are met, pt in no apparent distress

## 2022-04-22 NOTE — CONSULTS
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 History of anxiety.    Symptoms  · Mood: Occasional periods of low mood. Denied anhedonia. Denied suicidal ideation.  · Anxiety: Severe anxiety related to worries about health. PTSD symptoms contribute to anxiety as well as regular periods of anger and irritability.  · Pain: Rated current pain as 6/10. Denied concerns regarding pain management.  · Appetite: Noted low appetite today; clarified that appetite has been okay prior to today.  · Sleep: Moderate impairment involving delayed onset. Stated that some nights he is unable to fall asleep until sunrise. Anxiety interferes with sleep.    Psychotherapeutics (From admission, onward)            Start     Stop Route Frequency Ordered    04/20/22 1544  EScitalopram oxalate tablet 5 mg         -- Oral Daily 04/20/22 1544    04/12/22 2100  mirtazapine tablet 15 mg         -- Oral Nightly 04/12/22 1244        Intervention: Provided brief CBT intervention focused on anxiety. Reviewed relationships between thoughts, emotions, and behaviors. Reviewed function of avoidance. Avoidance/distraction is primary coping strategy (e.g., Playstation, music, phone). Guided patient in process of evaluating, challenging, and adjusting catastrophic thinking (i.e., I'm going to die in my sleep tonight.). Patient was engaged and participated well.     Mental Status Exam  General Appearance:  unremarkable, age appropriate, good grooming and hygiene,  dressed in unruly-shirt   Speech: Normal rate, volume, and prosody      Level of Cooperation: cooperative      Thought Processes: normal and logical   Mood: anxious      Thought Content: normal, no suicidality, no homicidality, delusions, or paranoia   Affect: congruent and appropriate   Orientation: Oriented x 4   Memory: No deficits noted.   Attention & Concentration: intact   Fund of General Knowledge: intact and appropriate to age and level of education   Abstract Reasoning: No deficits noted.   Judgment & Insight: good   Language: intact   Behavioral Observations: Sitting upright in bed. Good eye contact. No signs of psychomotor agitation or retardation.     Diagnostic Impression - Plan:     Active Diagnoses:    Diagnosis Date Noted POA    PRINCIPAL PROBLEM:  Acute on chronic combined systolic and diastolic heart failure, NYHA class 4 [I50.43] 10/25/2020 Yes    Cardiogenic shock [R57.0]  Unknown    Tobacco use [Z72.0] 04/12/2022 Unknown    Dilated cardiomyopathy [I42.0] 10/26/2020 Yes    ICD (implantable cardioverter-defibrillator) in place [Z95.810] 10/26/2020 Unknown    ROC (acute kidney injury) [N17.9] 10/26/2020 Unknown    Anxiety disorder, unspecified [F41.9] 10/25/2020 Yes      Problems Resolved During this Admission:     Impression: 36 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 History of anxiety. Patient endorsed severe anxiety. Current medical stress has exacerbated baseline anxiety. Social support is good. Coping skills are adequate. Patient would benefit from outpatient psychotherapy.    Plan: Psychology will continue to follow.    Recommendations: Outpatient psychotherapy for PTSD and anxiety      Thank you for the opportunity to participate in this patient's care.    Length of Service: 31 minutes    Filemon Reed, PhD  Psychology  [facility] - [facility]

## 2022-04-22 NOTE — PLAN OF CARE
Problem: Adult Inpatient Plan of Care  Goal: Plan of Care Review  Outcome: Ongoing, Progressing  Goal: Patient-Specific Goal (Individualized)  Outcome: Ongoing, Progressing  Goal: Absence of Hospital-Acquired Illness or Injury  Outcome: Ongoing, Progressing  Goal: Optimal Comfort and Wellbeing  Outcome: Ongoing, Progressing  Goal: Readiness for Transition of Care  Outcome: Ongoing, Progressing     Problem: Infection  Goal: Absence of Infection Signs and Symptoms  Outcome: Ongoing, Progressing     Problem: Adjustment to Illness (Heart Failure)  Goal: Optimal Coping  Outcome: Ongoing, Progressing     Problem: Cardiac Output Decreased (Heart Failure)  Goal: Optimal Cardiac Output  Outcome: Ongoing, Progressing     Problem: Dysrhythmia (Heart Failure)  Goal: Stable Heart Rate and Rhythm  Outcome: Ongoing, Progressing     Problem: Fluid Imbalance (Heart Failure)  Goal: Fluid Balance  Outcome: Ongoing, Progressing     Problem: Functional Ability Impaired (Heart Failure)  Goal: Optimal Functional Ability  Outcome: Ongoing, Progressing     Problem: Oral Intake Inadequate (Heart Failure)  Goal: Optimal Nutrition Intake  Outcome: Ongoing, Progressing     Problem: Respiratory Compromise (Heart Failure)  Goal: Effective Oxygenation and Ventilation  Outcome: Ongoing, Progressing     Problem: Sleep Disordered Breathing (Heart Failure)  Goal: Effective Breathing Pattern During Sleep  Outcome: Ongoing, Progressing     Problem: Cardiac Output Decreased  Goal: Effective Cardiac Output  Outcome: Ongoing, Progressing     Problem: Fluid and Electrolyte Imbalance (Acute Kidney Injury/Impairment)  Goal: Fluid and Electrolyte Balance  Outcome: Ongoing, Progressing     Problem: Oral Intake Inadequate (Acute Kidney Injury/Impairment)  Goal: Optimal Nutrition Intake  Outcome: Ongoing, Progressing     Problem: Renal Function Impairment (Acute Kidney Injury/Impairment)  Goal: Effective Renal Function  Outcome: Ongoing, Progressing      Problem: Fall Injury Risk  Goal: Absence of Fall and Fall-Related Injury  Outcome: Ongoing, Progressing

## 2022-04-22 NOTE — SUBJECTIVE & OBJECTIVE
Interval History:   -CVP 5 via PICC, JVP 8 of water on exam, continue lasix gtt 10 and milrinone gtt 0.5  -entresto was upped to 49/51 yesterday and Cr improved today  -he is doing well with no complaints  -ancticipate d/c next week on home inotropes, already approved for BTT VAD pending his staying away from smoking (quit 4/2022)    Continuous Infusions:   furosemide (LASIX) 10 mg/mL infusion (non-titrating) 10 mg/hr (04/22/22 1402)    milrinone 20mg/100ml D5W (200mcg/ml) 0.5 mcg/kg/min (04/22/22 1400)     Scheduled Meds:   aspirin  81 mg Oral Daily    digoxin  0.125 mg Oral Daily    EScitalopram oxalate  5 mg Oral Daily    ferrous sulfate  1 tablet Oral Daily    heparin (porcine)  5,000 Units Subcutaneous Q8H    insulin detemir U-100  5 Units Subcutaneous Daily    mirtazapine  15 mg Oral Nightly    pantoprazole  40 mg Oral Daily    sacubitriL-valsartan  1 tablet Oral BID    spironolactone  25 mg Oral Daily    sucralfate  1 g Oral Nightly     PRN Meds:acetaminophen, dextrose 10%, dextrose 10%, glucagon (human recombinant), glucose, glucose, hydrOXYzine pamoate, insulin aspart U-100, LIDOcaine-EPINEPHrine 1%-1:100,000, methocarbamoL, sodium chloride 0.9%    Review of patient's allergies indicates:   Allergen Reactions    Bumex [bumetanide] Hives    Lactose Diarrhea    Torsemide Hives     Objective:     Vital Signs (Most Recent):  Temp: 97 °F (36.1 °C) (04/22/22 1135)  Pulse: 105 (04/22/22 1135)  Resp: 20 (04/22/22 1135)  BP: 105/61 (04/22/22 1135)  SpO2: 97 % (04/22/22 1135) Vital Signs (24h Range):  Temp:  [96.6 °F (35.9 °C)-98.3 °F (36.8 °C)] 97 °F (36.1 °C)  Pulse:  [] 105  Resp:  [16-20] 20  SpO2:  [96 %-100 %] 97 %  BP: ()/(50-66) 105/61     Patient Vitals for the past 72 hrs (Last 3 readings):   Weight   04/22/22 0805 93 kg (205 lb 0.4 oz)   04/19/22 1551 93.9 kg (207 lb)     Body mass index is 25.63 kg/m².      Intake/Output Summary (Last 24 hours) at 4/22/2022 1413  Last data filed at 4/22/2022  1400  Gross per 24 hour   Intake 904.21 ml   Output 3025 ml   Net -2120.79 ml       Hemodynamic Parameters:  CVP:  [5 mmHg] 5 mmHg    Telemetry: rev'd, ST     Physical Exam  HEENT: Sclera anicteric, PERRLA, EOMI  Neck: JVP at the clavicle, no masses, good movement  CV: RRR, S1 and S2 normal, no additional heart sounds or murmurs. Pulses 2+ and equal bilaterally in radial arteries, Johnathan's normal on right. Distal pulses are 2+ and equal in the femoral, DP and PT areas bilaterally  CVP 5 mmHg via PICC  Pulm: Clear to auscultation bilaterally with symmetrical expansion. Chest wall palpated for reproduction of pain symptoms, and no pain was able to be produced on palpation or resistance exercises  GI: Abdomen soft, non-tender, good bowel sounds  Extremities: Both extremities intact and grossly normal, skin is warm, PICC in right UE   Psych: AOx3, appropriate affect  Neuro: CNII-XII intact, no focal deficits  Significant Labs:  CBC:  Recent Labs   Lab 04/20/22 0325 04/21/22  0402 04/22/22  0355   WBC 7.92 11.19 8.17   RBC 4.51* 4.53* 4.72   HGB 14.5 14.1 14.6   HCT 42.9 42.1 44.1    269 267   MCV 95 93 93   MCH 32.2* 31.1* 30.9   MCHC 33.8 33.5 33.1     BNP:  Recent Labs   Lab 04/18/22  1403   *     CMP:  Recent Labs   Lab 04/20/22  0325 04/21/22  0402 04/22/22  0355   *  138* 225* 172*  167*   CALCIUM 10.3  10.3 10.1 10.2  10.4   ALBUMIN 4.5 4.2 4.4  4.3   PROT 8.8* 8.4 8.5*  8.4   *  135* 135* 134*  134*   K 4.1  4.1 4.1 4.0  4.1   CO2 26  26 27 28  25   CL 95  95 95 96  95   BUN 33*  33* 34* 34*  34*   CREATININE 1.9*  1.9* 1.8* 1.7*  1.6*   ALKPHOS 97 107 94  92   ALT 39 45* 47*  47*   AST 40 42* 45*  43*   BILITOT 0.7 0.6 0.6  0.6      Coagulation:   Recent Labs   Lab 04/19/22  0430   INR 1.1   APTT 27.6     LDH:  No results for input(s): LDH in the last 72 hours.  Microbiology:  Microbiology Results (last 7 days)       ** No results found for the last 168 hours. **             I have reviewed all pertinent labs within the past 24 hours.    Estimated Creatinine Clearance: 75.6 mL/min (A) (based on SCr of 1.6 mg/dL (H)).    Diagnostic Results:  I have reviewed all pertinent imaging results/findings within the past 24 hours.

## 2022-04-23 LAB
ALBUMIN SERPL BCP-MCNC: 4.3 G/DL (ref 3.5–5.2)
ALP SERPL-CCNC: 95 U/L (ref 55–135)
ALT SERPL W/O P-5'-P-CCNC: 48 U/L (ref 10–44)
ANION GAP SERPL CALC-SCNC: 11 MMOL/L (ref 8–16)
ANION GAP SERPL CALC-SCNC: 13 MMOL/L (ref 8–16)
AST SERPL-CCNC: 41 U/L (ref 10–40)
BASOPHILS # BLD AUTO: 0.04 K/UL (ref 0–0.2)
BASOPHILS NFR BLD: 0.5 % (ref 0–1.9)
BILIRUB SERPL-MCNC: 0.5 MG/DL (ref 0.1–1)
BUN SERPL-MCNC: 29 MG/DL (ref 6–20)
BUN SERPL-MCNC: 32 MG/DL (ref 6–20)
CALCIUM SERPL-MCNC: 10 MG/DL (ref 8.7–10.5)
CALCIUM SERPL-MCNC: 9.7 MG/DL (ref 8.7–10.5)
CHLORIDE SERPL-SCNC: 95 MMOL/L (ref 95–110)
CHLORIDE SERPL-SCNC: 96 MMOL/L (ref 95–110)
CO2 SERPL-SCNC: 27 MMOL/L (ref 23–29)
CO2 SERPL-SCNC: 28 MMOL/L (ref 23–29)
CREAT SERPL-MCNC: 1.6 MG/DL (ref 0.5–1.4)
CREAT SERPL-MCNC: 1.9 MG/DL (ref 0.5–1.4)
DIFFERENTIAL METHOD: ABNORMAL
EOSINOPHIL # BLD AUTO: 0.2 K/UL (ref 0–0.5)
EOSINOPHIL NFR BLD: 2.7 % (ref 0–8)
ERYTHROCYTE [DISTWIDTH] IN BLOOD BY AUTOMATED COUNT: 13.9 % (ref 11.5–14.5)
EST. GFR  (AFRICAN AMERICAN): 50.9 ML/MIN/1.73 M^2
EST. GFR  (AFRICAN AMERICAN): >60 ML/MIN/1.73 M^2
EST. GFR  (NON AFRICAN AMERICAN): 44.1 ML/MIN/1.73 M^2
EST. GFR  (NON AFRICAN AMERICAN): 54.2 ML/MIN/1.73 M^2
GLUCOSE SERPL-MCNC: 204 MG/DL (ref 70–110)
GLUCOSE SERPL-MCNC: 309 MG/DL (ref 70–110)
HCT VFR BLD AUTO: 43 % (ref 40–54)
HGB BLD-MCNC: 14.4 G/DL (ref 14–18)
IMM GRANULOCYTES # BLD AUTO: 0.02 K/UL (ref 0–0.04)
IMM GRANULOCYTES NFR BLD AUTO: 0.3 % (ref 0–0.5)
LYMPHOCYTES # BLD AUTO: 2.1 K/UL (ref 1–4.8)
LYMPHOCYTES NFR BLD: 27.9 % (ref 18–48)
MAGNESIUM SERPL-MCNC: 2 MG/DL (ref 1.6–2.6)
MAGNESIUM SERPL-MCNC: 2.2 MG/DL (ref 1.6–2.6)
MCH RBC QN AUTO: 31.4 PG (ref 27–31)
MCHC RBC AUTO-ENTMCNC: 33.5 G/DL (ref 32–36)
MCV RBC AUTO: 94 FL (ref 82–98)
MONOCYTES # BLD AUTO: 0.9 K/UL (ref 0.3–1)
MONOCYTES NFR BLD: 11.8 % (ref 4–15)
NEUTROPHILS # BLD AUTO: 4.2 K/UL (ref 1.8–7.7)
NEUTROPHILS NFR BLD: 56.8 % (ref 38–73)
NRBC BLD-RTO: 0 /100 WBC
PLATELET # BLD AUTO: 268 K/UL (ref 150–450)
PMV BLD AUTO: 10 FL (ref 9.2–12.9)
POCT GLUCOSE: 127 MG/DL (ref 70–110)
POCT GLUCOSE: 216 MG/DL (ref 70–110)
POTASSIUM SERPL-SCNC: 4.2 MMOL/L (ref 3.5–5.1)
POTASSIUM SERPL-SCNC: 4.8 MMOL/L (ref 3.5–5.1)
PROT SERPL-MCNC: 8 G/DL (ref 6–8.4)
RBC # BLD AUTO: 4.58 M/UL (ref 4.6–6.2)
SODIUM SERPL-SCNC: 135 MMOL/L (ref 136–145)
SODIUM SERPL-SCNC: 135 MMOL/L (ref 136–145)
WBC # BLD AUTO: 7.35 K/UL (ref 3.9–12.7)

## 2022-04-23 PROCEDURE — 80048 BASIC METABOLIC PNL TOTAL CA: CPT | Mod: NTX,XB | Performed by: PHYSICIAN ASSISTANT

## 2022-04-23 PROCEDURE — 20600001 HC STEP DOWN PRIVATE ROOM: Mod: NTX

## 2022-04-23 PROCEDURE — 85025 COMPLETE CBC W/AUTO DIFF WBC: CPT | Mod: NTX | Performed by: NURSE PRACTITIONER

## 2022-04-23 PROCEDURE — 83735 ASSAY OF MAGNESIUM: CPT | Mod: NTX | Performed by: NURSE PRACTITIONER

## 2022-04-23 PROCEDURE — 25000003 PHARM REV CODE 250: Mod: NTX | Performed by: INTERNAL MEDICINE

## 2022-04-23 PROCEDURE — 99233 SBSQ HOSP IP/OBS HIGH 50: CPT | Mod: NTX,,, | Performed by: INTERNAL MEDICINE

## 2022-04-23 PROCEDURE — 83735 ASSAY OF MAGNESIUM: CPT | Mod: 91,NTX | Performed by: PHYSICIAN ASSISTANT

## 2022-04-23 PROCEDURE — 93010 ELECTROCARDIOGRAM REPORT: CPT | Mod: NTX,,, | Performed by: INTERNAL MEDICINE

## 2022-04-23 PROCEDURE — 94761 N-INVAS EAR/PLS OXIMETRY MLT: CPT | Mod: NTX

## 2022-04-23 PROCEDURE — 99233 PR SUBSEQUENT HOSPITAL CARE,LEVL III: ICD-10-PCS | Mod: NTX,,, | Performed by: INTERNAL MEDICINE

## 2022-04-23 PROCEDURE — 36415 COLL VENOUS BLD VENIPUNCTURE: CPT | Mod: NTX | Performed by: NURSE PRACTITIONER

## 2022-04-23 PROCEDURE — 36415 COLL VENOUS BLD VENIPUNCTURE: CPT | Mod: NTX | Performed by: PHYSICIAN ASSISTANT

## 2022-04-23 PROCEDURE — 93005 ELECTROCARDIOGRAM TRACING: CPT | Mod: NTX

## 2022-04-23 PROCEDURE — 99900035 HC TECH TIME PER 15 MIN (STAT): Mod: NTX

## 2022-04-23 PROCEDURE — C9399 UNCLASSIFIED DRUGS OR BIOLOG: HCPCS | Mod: NTX | Performed by: INTERNAL MEDICINE

## 2022-04-23 PROCEDURE — 63600175 PHARM REV CODE 636 W HCPCS: Mod: NTX | Performed by: STUDENT IN AN ORGANIZED HEALTH CARE EDUCATION/TRAINING PROGRAM

## 2022-04-23 PROCEDURE — 63600175 PHARM REV CODE 636 W HCPCS: Mod: NTX | Performed by: INTERNAL MEDICINE

## 2022-04-23 PROCEDURE — 25000003 PHARM REV CODE 250: Mod: NTX | Performed by: NURSE PRACTITIONER

## 2022-04-23 PROCEDURE — 93010 EKG 12-LEAD: ICD-10-PCS | Mod: NTX,,, | Performed by: INTERNAL MEDICINE

## 2022-04-23 PROCEDURE — 80053 COMPREHEN METABOLIC PANEL: CPT | Mod: NTX | Performed by: NURSE PRACTITIONER

## 2022-04-23 RX ORDER — FUROSEMIDE 80 MG/1
80 TABLET ORAL 2 TIMES DAILY
Status: DISCONTINUED | OUTPATIENT
Start: 2022-04-23 | End: 2022-04-25 | Stop reason: HOSPADM

## 2022-04-23 RX ADMIN — MILRINONE LACTATE IN DEXTROSE 0.5 MCG/KG/MIN: 200 INJECTION, SOLUTION INTRAVENOUS at 05:04

## 2022-04-23 RX ADMIN — PANTOPRAZOLE SODIUM 40 MG: 40 TABLET, DELAYED RELEASE ORAL at 08:04

## 2022-04-23 RX ADMIN — ESCITALOPRAM OXALATE 5 MG: 5 TABLET, FILM COATED ORAL at 08:04

## 2022-04-23 RX ADMIN — MILRINONE LACTATE IN DEXTROSE 0.5 MCG/KG/MIN: 200 INJECTION, SOLUTION INTRAVENOUS at 12:04

## 2022-04-23 RX ADMIN — SUCRALFATE 1 G: 1 TABLET ORAL at 09:04

## 2022-04-23 RX ADMIN — MIRTAZAPINE 15 MG: 15 TABLET, FILM COATED ORAL at 09:04

## 2022-04-23 RX ADMIN — ASPIRIN 81 MG CHEWABLE TABLET 81 MG: 81 TABLET CHEWABLE at 08:04

## 2022-04-23 RX ADMIN — FERROUS SULFATE TAB 325 MG (65 MG ELEMENTAL FE) 1 EACH: 325 (65 FE) TAB at 08:04

## 2022-04-23 RX ADMIN — ENOXAPARIN SODIUM 40 MG: 100 INJECTION SUBCUTANEOUS at 05:04

## 2022-04-23 RX ADMIN — INSULIN DETEMIR 5 UNITS: 100 INJECTION, SOLUTION SUBCUTANEOUS at 08:04

## 2022-04-23 RX ADMIN — SACUBITRIL AND VALSARTAN 1 TABLET: 49; 51 TABLET, FILM COATED ORAL at 09:04

## 2022-04-23 RX ADMIN — MILRINONE LACTATE IN DEXTROSE 0.5 MCG/KG/MIN: 200 INJECTION, SOLUTION INTRAVENOUS at 08:04

## 2022-04-23 RX ADMIN — INSULIN ASPART 2 UNITS: 100 INJECTION, SOLUTION INTRAVENOUS; SUBCUTANEOUS at 12:04

## 2022-04-23 RX ADMIN — SACUBITRIL AND VALSARTAN 1 TABLET: 49; 51 TABLET, FILM COATED ORAL at 08:04

## 2022-04-23 RX ADMIN — SPIRONOLACTONE 25 MG: 25 TABLET, FILM COATED ORAL at 08:04

## 2022-04-23 RX ADMIN — DIGOXIN 0.12 MG: 125 TABLET ORAL at 08:04

## 2022-04-23 RX ADMIN — FUROSEMIDE 80 MG: 80 TABLET ORAL at 01:04

## 2022-04-23 NOTE — ASSESSMENT & PLAN NOTE
NIDCM , on home Milrinone, S/P ICD, h/o polysubstance abuse, tox screen 4/8 -ve, admitted with ADHF and for consideration for advanced options w/u:    -RHC on Milrinone 0.25 mcg on 4/8: RA 15, PA 59/35 (43), W 33 and CO/CI 3.27/1.47  -TTE on Milrinone 0.25 mcg on 4/8: LVEDD 7, LVEF 10%, grade 3 diastolic dysfunction, RV severely dilated, mod to severely depressed, mod-severe MR, mild TR, PAS > 50 and IVC 8  - Admit labs showed sCr 1.7 (baseline of ~ 1.6)  - Increased Milrinone to 0.375 mcg/kg/min on admit. Milrinone then increased to 0.5 mcg/kg/min overnight 2/2 drop in sVO2. sVO2 has since improved to 57 with CO/CI 5.05/2.23 and SVR 1124  - GDMT; Hold Toprol given decompensation. Continue Entresto 49/51, Dig and Aldactone  - approved for VAD 4/20, will have to stay smoking free x 6 months to be considered for OHT, quit in 4/2020    RHC 4/19 on 0.5 gtt milrinone   · RA 11 PA 50/27 (35) PCWP 27 CO 3.7 l/min CI 1.7 l/min/m2.  · PVR 2.2 CHRISTENSEN    Plan  -stopped lasix gtt at midnight, will start po lasix today  -milrinone 0.5 gtt  -K>4, Mg>2, tele  -on entresto 24/26 bid for unloading, increased to 49/51 4/21   -on dig and aldactone 25 daily

## 2022-04-23 NOTE — PLAN OF CARE
Problem: Adult Inpatient Plan of Care  Goal: Patient-Specific Goal (Individualized)  Outcome: Ongoing, Progressing  Flowsheets (Taken 4/23/2022 3555)  Anxieties, Fears or Concerns: None voiced  Individualized Care Needs: LVAD WORKUP. Milrinone gtt. BG protocol. Monitor HR  Patient-Specific Goals (Include Timeframe): Pt will be free of falls and injuires throughout shift.   As per nursing communication CVP and SVO2 to be done 4/24 @ 0500. Will relay message to night shift RN as per orders.

## 2022-04-23 NOTE — PLAN OF CARE
Pt is AAOx4.  POC reviewed at bedside with pt and spouse. VS stable, afebrile throughout shift.  Independent in room. Pt denies any chest pain or palpitation, no s/s of respiratory distress.  Pt has remained free from injury during this shift. Pt bed in low locked position. Call light and personal belongings within reach. Side rails up x2.  Pt was advice to call nurse with any questions or concerns. Continue on Milirone 0.5 gtt. Will continue to monitor.

## 2022-04-23 NOTE — SUBJECTIVE & OBJECTIVE
Interval History:   -lasix gtt stopped overnight, JVP 6 cm H2O on exam, just 1 cm above clavicle at 45 angle, will start PO lasix today  -on milrinone 0.5 gtt.  -psychology is following, will need outpt f/up with them for therapy for PTSD and anxiety    Continuous Infusions:   milrinone 20mg/100ml D5W (200mcg/ml) 0.5 mcg/kg/min (04/23/22 0524)     Scheduled Meds:   aspirin  81 mg Oral Daily    digoxin  0.125 mg Oral Daily    enoxaparin  40 mg Subcutaneous Daily    EScitalopram oxalate  5 mg Oral Daily    ferrous sulfate  1 tablet Oral Daily    insulin detemir U-100  5 Units Subcutaneous Daily    mirtazapine  15 mg Oral Nightly    pantoprazole  40 mg Oral Daily    sacubitriL-valsartan  1 tablet Oral BID    spironolactone  25 mg Oral Daily    sucralfate  1 g Oral Nightly     PRN Meds:acetaminophen, dextrose 10%, dextrose 10%, glucagon (human recombinant), glucose, glucose, hydrOXYzine pamoate, insulin aspart U-100, LIDOcaine-EPINEPHrine 1%-1:100,000, methocarbamoL, sodium chloride 0.9%    Review of patient's allergies indicates:   Allergen Reactions    Bumex [bumetanide] Hives    Lactose Diarrhea    Torsemide Hives     Objective:     Vital Signs (Most Recent):  Temp: 97.5 °F (36.4 °C) (04/23/22 0708)  Pulse: 99 (04/23/22 0708)  Resp: 18 (04/23/22 0708)  BP: (!) 95/57 (04/23/22 0708)  SpO2: 98 % (04/23/22 0708)   Vital Signs (24h Range):  Temp:  [96.3 °F (35.7 °C)-98.8 °F (37.1 °C)] 97.5 °F (36.4 °C)  Pulse:  [] 99  Resp:  [18-20] 18  SpO2:  [97 %-99 %] 98 %  BP: ()/(55-65) 95/57     Patient Vitals for the past 72 hrs (Last 3 readings):   Weight   04/23/22 0759 93.4 kg (205 lb 14.6 oz)   04/22/22 0805 93 kg (205 lb 0.4 oz)     Body mass index is 25.74 kg/m².      Intake/Output Summary (Last 24 hours) at 4/23/2022 0926  Last data filed at 4/23/2022 0524  Gross per 24 hour   Intake 1422 ml   Output 4525 ml   Net -3103 ml       Hemodynamic Parameters:  CVP:  [5 mmHg] 5 mmHg    Telemetry: rev'd    Physical  Exam  HEENT: Sclera anicteric, PERRLA, EOMI  Neck: JVP at the clavicle, no masses, good movement  CV: RRR, S1 and S2 normal, no additional heart sounds or murmurs. Pulses 2+ and equal bilaterally in radial arteries, Johnathan's normal on right. Distal pulses are 2+ and equal in the femoral, DP and PT areas bilaterally  JVD 6 cmH2O at 45 degrees  Pulm: Clear to auscultation bilaterally with symmetrical expansion. Chest wall palpated for reproduction of pain symptoms, and no pain was able to be produced on palpation or resistance exercises  GI: Abdomen soft, non-tender, good bowel sounds  Extremities: Both extremities intact and grossly normal, skin is warm, PICC in right UE   Psych: AOx3, appropriate affect  Neuro: CNII-XII intact, no focal deficits  Significant Labs:  CBC:  Recent Labs   Lab 04/21/22  0402 04/22/22  0355 04/23/22  0435   WBC 11.19 8.17 7.35   RBC 4.53* 4.72 4.58*   HGB 14.1 14.6 14.4   HCT 42.1 44.1 43.0    267 268   MCV 93 93 94   MCH 31.1* 30.9 31.4*   MCHC 33.5 33.1 33.5     BNP:  Recent Labs   Lab 04/18/22  1403   *     CMP:  Recent Labs   Lab 04/21/22  0402 04/22/22  0355 04/23/22  0435   * 172*  167* 204*   CALCIUM 10.1 10.2  10.4 10.0   ALBUMIN 4.2 4.4  4.3 4.3   PROT 8.4 8.5*  8.4 8.0   * 134*  134* 135*   K 4.1 4.0  4.1 4.2   CO2 27 28  25 28   CL 95 96  95 96   BUN 34* 34*  34* 32*   CREATININE 1.8* 1.7*  1.6* 1.6*   ALKPHOS 107 94  92 95   ALT 45* 47*  47* 48*   AST 42* 45*  43* 41*   BILITOT 0.6 0.6  0.6 0.5      Coagulation:   Recent Labs   Lab 04/19/22  0430   INR 1.1   APTT 27.6     LDH:  No results for input(s): LDH in the last 72 hours.  Microbiology:  Microbiology Results (last 7 days)       ** No results found for the last 168 hours. **            I have reviewed all pertinent labs within the past 24 hours.    Estimated Creatinine Clearance: 75.6 mL/min (A) (based on SCr of 1.6 mg/dL (H)).    Diagnostic Results:  I have reviewed all pertinent  imaging results/findings within the past 24 hours.

## 2022-04-23 NOTE — PROGRESS NOTES
[facility]  Heart Transplant  Progress Note    Patient Name: Kevan Queen  MRN: [id number]  Admission Date: 4/12/2022  Hospital Length of Stay: 11 days  Attending Physician: Luca Lopez Jr.,*  Primary Care Provider: ORALIA Cline  Principal Problem:Acute on chronic combined systolic and diastolic heart failure, NYHA class 4    Subjective:     Interval History:   -lasix gtt stopped overnight, JVP 6 cm H2O on exam, just 1 cm above clavicle at 45 angle, will start PO lasix today  -on milrinone 0.5 gtt.  -psychology is following, will need outpt f/up with them for therapy for PTSD and anxiety    Continuous Infusions:   milrinone 20mg/100ml D5W (200mcg/ml) 0.5 mcg/kg/min (04/23/22 0524)     Scheduled Meds:   aspirin  81 mg Oral Daily    digoxin  0.125 mg Oral Daily    enoxaparin  40 mg Subcutaneous Daily    EScitalopram oxalate  5 mg Oral Daily    ferrous sulfate  1 tablet Oral Daily    insulin detemir U-100  5 Units Subcutaneous Daily    mirtazapine  15 mg Oral Nightly    pantoprazole  40 mg Oral Daily    sacubitriL-valsartan  1 tablet Oral BID    spironolactone  25 mg Oral Daily    sucralfate  1 g Oral Nightly     PRN Meds:acetaminophen, dextrose 10%, dextrose 10%, glucagon (human recombinant), glucose, glucose, hydrOXYzine pamoate, insulin aspart U-100, LIDOcaine-EPINEPHrine 1%-1:100,000, methocarbamoL, sodium chloride 0.9%    Review of patient's allergies indicates:   Allergen Reactions    Bumex [bumetanide] Hives    Lactose Diarrhea    Torsemide Hives     Objective:     Vital Signs (Most Recent):  Temp: 97.5 °F (36.4 °C) (04/23/22 0708)  Pulse: 99 (04/23/22 0708)  Resp: 18 (04/23/22 0708)  BP: (!) 95/57 (04/23/22 0708)  SpO2: 98 % (04/23/22 0708)   Vital Signs (24h Range):  Temp:  [96.3 °F (35.7 °C)-98.8 °F (37.1 °C)] 97.5 °F (36.4 °C)  Pulse:  [] 99  Resp:  [18-20] 18  SpO2:  [97 %-99 %] 98 %  BP: ()/(55-65) 95/57     Patient Vitals for the past 72 hrs  (Last 3 readings):   Weight   04/23/22 0759 93.4 kg (205 lb 14.6 oz)   04/22/22 0805 93 kg (205 lb 0.4 oz)     Body mass index is 25.74 kg/m².      Intake/Output Summary (Last 24 hours) at 4/23/2022 0926  Last data filed at 4/23/2022 0524  Gross per 24 hour   Intake 1422 ml   Output 4525 ml   Net -3103 ml       Hemodynamic Parameters:  CVP:  [5 mmHg] 5 mmHg    Telemetry: rev'd    Physical Exam  HEENT: Sclera anicteric, PERRLA, EOMI  Neck: JVP at the clavicle, no masses, good movement  CV: RRR, S1 and S2 normal, no additional heart sounds or murmurs. Pulses 2+ and equal bilaterally in radial arteries, Johnathan's normal on right. Distal pulses are 2+ and equal in the femoral, DP and PT areas bilaterally  JVD 6 cmH2O at 45 degrees  Pulm: Clear to auscultation bilaterally with symmetrical expansion. Chest wall palpated for reproduction of pain symptoms, and no pain was able to be produced on palpation or resistance exercises  GI: Abdomen soft, non-tender, good bowel sounds  Extremities: Both extremities intact and grossly normal, skin is warm, PICC in right UE   Psych: AOx3, appropriate affect  Neuro: CNII-XII intact, no focal deficits  Significant Labs:  CBC:  Recent Labs   Lab 04/21/22 0402 04/22/22  0355 04/23/22  0435   WBC 11.19 8.17 7.35   RBC 4.53* 4.72 4.58*   HGB 14.1 14.6 14.4   HCT 42.1 44.1 43.0    267 268   MCV 93 93 94   MCH 31.1* 30.9 31.4*   MCHC 33.5 33.1 33.5     BNP:  Recent Labs   Lab 04/18/22  1403   *     CMP:  Recent Labs   Lab 04/21/22 0402 04/22/22  0355 04/23/22  0435   * 172*  167* 204*   CALCIUM 10.1 10.2  10.4 10.0   ALBUMIN 4.2 4.4  4.3 4.3   PROT 8.4 8.5*  8.4 8.0   * 134*  134* 135*   K 4.1 4.0  4.1 4.2   CO2 27 28  25 28   CL 95 96  95 96   BUN 34* 34*  34* 32*   CREATININE 1.8* 1.7*  1.6* 1.6*   ALKPHOS 107 94  92 95   ALT 45* 47*  47* 48*   AST 42* 45*  43* 41*   BILITOT 0.6 0.6  0.6 0.5      Coagulation:   Recent Labs   Lab 04/19/22  0430   INR  1.1   APTT 27.6     LDH:  No results for input(s): LDH in the last 72 hours.  Microbiology:  Microbiology Results (last 7 days)       ** No results found for the last 168 hours. **            I have reviewed all pertinent labs within the past 24 hours.    Estimated Creatinine Clearance: 75.6 mL/min (A) (based on SCr of 1.6 mg/dL (H)).    Diagnostic Results:  I have reviewed all pertinent imaging results/findings within the past 24 hours.    Assessment and Plan:     36 yo BM with NICM, on home milrinone, hx of substance abuse who comes in for f/u. States that he has been doing well since last visit with Dr Lopez.Sleeps on 2 pillows. No PND anymore. Volume/weight has been stable. Great appetite. He did have to have his PICC line replaced as it got pulled out during sleep. He got his ICD placed last month in [place].      He claims being clean of any drugs, alcoho or tobacco for a long time. He came with his fiance that he has been living with for the last 8 years. They have 2 children in common. He has a total of 9 kids. Tox screen done 2/4/22 was -ve, but nicotine/cotinine screen was +ve same day. He admits to occasional cigarette smoking.      Underwent risk assessment with an echo and RHC on 4/8, both done on Milrinone 0.25 mcg/kg/min:    TTE: LVEDD 7, L:VEF 10%, grade 3 diastolic dysfunction, RV severely enlarge and mod to severely depressed, mod to severe MR, mild TR, PAS > 50 and IVC 8    RHC: RA 15, PAP 59/35 (43), W 33 and CO/CI 3.27/1.47.    Current GDMT: Toprol 25 mg qd, Entresto 24-26 bid, Aldactone 12.5 mg qd. Takes Lasix 80 mg bid plus Metolazone 2.5 mg qod    He presents as a direct admit with AHD and for advanced options consideration            * Acute on chronic combined systolic and diastolic heart failure, NYHA class 4  NIDCM , on home Milrinone, S/P ICD, h/o polysubstance abuse, tox screen 4/8 -ve, admitted with ADHF and for consideration for advanced options w/u:    -RHC on Milrinone 0.25 mcg  on 4/8: RA 15, PA 59/35 (43), W 33 and CO/CI 3.27/1.47  -TTE on Milrinone 0.25 mcg on 4/8: LVEDD 7, LVEF 10%, grade 3 diastolic dysfunction, RV severely dilated, mod to severely depressed, mod-severe MR, mild TR, PAS > 50 and IVC 8  - Admit labs showed sCr 1.7 (baseline of ~ 1.6)  - Increased Milrinone to 0.375 mcg/kg/min on admit. Milrinone then increased to 0.5 mcg/kg/min overnight 2/2 drop in sVO2. sVO2 has since improved to 57 with CO/CI 5.05/2.23 and SVR 1124  - GDMT; Hold Toprol given decompensation. Continue Entresto 49/51, Dig and Aldactone  - approved for VAD 4/20, will have to stay smoking free x 6 months to be considered for OHT, quit in 4/2020    [facility] 4/19 on 0.5 gtt milrinone   · RA 11 PA 50/27 (35) PCWP 27 CO 3.7 l/min CI 1.7 l/min/m2.  · PVR 2.2 CHRISTENSEN    Plan  -stopped lasix gtt at midnight, will start po lasix today  -milrinone 0.5 gtt  -K>4, Mg>2, tele  -on entresto 24/26 bid for unloading, increased to 49/51 4/21   -on dig and aldactone 25 daily           Tobacco use  - Continued to smoke until 4/2020  - will have to stay smoking free x 6 months to be considered for OHT    ROC (acute kidney injury)  Cr 1.6 currently and improving, baseline around 1.4  Continue volume management per hemodynamcis    Anxiety disorder, unspecified  Psych consulted 4/20  Started lexapro 5 daily and prn vistarel per psych  Keep Remeron 15 daily   Psychology is also following and will need outpt psychology f/up as well for PTSD      Jan Pruitt MD  Heart Transplant  [facility] - [facility]

## 2022-04-23 NOTE — CARE UPDATE
-resume maintenance diuretic with po lasix 80 bid today since we achieved euvolemia    -will obtain CVP/SVO2 both in the morning tomorrow to see if milrinone gtt could be weaned down

## 2022-04-24 LAB
ALBUMIN SERPL BCP-MCNC: 4 G/DL (ref 3.5–5.2)
ALLENS TEST: ABNORMAL
ALP SERPL-CCNC: 84 U/L (ref 55–135)
ALT SERPL W/O P-5'-P-CCNC: 53 U/L (ref 10–44)
ANION GAP SERPL CALC-SCNC: 13 MMOL/L (ref 8–16)
AST SERPL-CCNC: 44 U/L (ref 10–40)
BASOPHILS # BLD AUTO: 0.02 K/UL (ref 0–0.2)
BASOPHILS NFR BLD: 0.3 % (ref 0–1.9)
BILIRUB SERPL-MCNC: 0.5 MG/DL (ref 0.1–1)
BUN SERPL-MCNC: 28 MG/DL (ref 6–20)
CALCIUM SERPL-MCNC: 10 MG/DL (ref 8.7–10.5)
CHLORIDE SERPL-SCNC: 97 MMOL/L (ref 95–110)
CO2 SERPL-SCNC: 26 MMOL/L (ref 23–29)
CREAT SERPL-MCNC: 1.7 MG/DL (ref 0.5–1.4)
DIFFERENTIAL METHOD: ABNORMAL
EOSINOPHIL # BLD AUTO: 0.2 K/UL (ref 0–0.5)
EOSINOPHIL NFR BLD: 2.7 % (ref 0–8)
ERYTHROCYTE [DISTWIDTH] IN BLOOD BY AUTOMATED COUNT: 13.5 % (ref 11.5–14.5)
EST. GFR  (AFRICAN AMERICAN): 58.3 ML/MIN/1.73 M^2
EST. GFR  (NON AFRICAN AMERICAN): 50.4 ML/MIN/1.73 M^2
GLUCOSE SERPL-MCNC: 152 MG/DL (ref 70–110)
HCO3 UR-SCNC: 31.4 MMOL/L (ref 24–28)
HCT VFR BLD AUTO: 41.4 % (ref 40–54)
HGB BLD-MCNC: 13.9 G/DL (ref 14–18)
IMM GRANULOCYTES # BLD AUTO: 0.01 K/UL (ref 0–0.04)
IMM GRANULOCYTES NFR BLD AUTO: 0.2 % (ref 0–0.5)
LYMPHOCYTES # BLD AUTO: 1.9 K/UL (ref 1–4.8)
LYMPHOCYTES NFR BLD: 29.3 % (ref 18–48)
MAGNESIUM SERPL-MCNC: 2.1 MG/DL (ref 1.6–2.6)
MCH RBC QN AUTO: 31.5 PG (ref 27–31)
MCHC RBC AUTO-ENTMCNC: 33.6 G/DL (ref 32–36)
MCV RBC AUTO: 94 FL (ref 82–98)
MONOCYTES # BLD AUTO: 0.8 K/UL (ref 0.3–1)
MONOCYTES NFR BLD: 12 % (ref 4–15)
NEUTROPHILS # BLD AUTO: 3.7 K/UL (ref 1.8–7.7)
NEUTROPHILS NFR BLD: 55.5 % (ref 38–73)
NRBC BLD-RTO: 0 /100 WBC
PCO2 BLDA: 59.2 MMHG (ref 35–45)
PH SMN: 7.33 [PH] (ref 7.35–7.45)
PLATELET # BLD AUTO: 263 K/UL (ref 150–450)
PMV BLD AUTO: 10.1 FL (ref 9.2–12.9)
PO2 BLDA: 39 MMHG (ref 40–60)
POC BE: 5 MMOL/L
POC SATURATED O2: 68 % (ref 95–100)
POC TCO2: 33 MMOL/L (ref 24–29)
POCT GLUCOSE: 138 MG/DL (ref 70–110)
POCT GLUCOSE: 166 MG/DL (ref 70–110)
POCT GLUCOSE: 170 MG/DL (ref 70–110)
POCT GLUCOSE: 173 MG/DL (ref 70–110)
POCT GLUCOSE: 181 MG/DL (ref 70–110)
POCT GLUCOSE: 375 MG/DL (ref 70–110)
POCT GLUCOSE: 407 MG/DL (ref 70–110)
POTASSIUM SERPL-SCNC: 4.1 MMOL/L (ref 3.5–5.1)
PROT SERPL-MCNC: 7.6 G/DL (ref 6–8.4)
RBC # BLD AUTO: 4.41 M/UL (ref 4.6–6.2)
SAMPLE: ABNORMAL
SITE: ABNORMAL
SODIUM SERPL-SCNC: 136 MMOL/L (ref 136–145)
WBC # BLD AUTO: 6.59 K/UL (ref 3.9–12.7)

## 2022-04-24 PROCEDURE — 25000003 PHARM REV CODE 250: Mod: NTX | Performed by: NURSE PRACTITIONER

## 2022-04-24 PROCEDURE — 63600175 PHARM REV CODE 636 W HCPCS: Mod: NTX | Performed by: INTERNAL MEDICINE

## 2022-04-24 PROCEDURE — 63600175 PHARM REV CODE 636 W HCPCS: Mod: NTX | Performed by: STUDENT IN AN ORGANIZED HEALTH CARE EDUCATION/TRAINING PROGRAM

## 2022-04-24 PROCEDURE — 20600001 HC STEP DOWN PRIVATE ROOM: Mod: NTX

## 2022-04-24 PROCEDURE — 99900035 HC TECH TIME PER 15 MIN (STAT): Mod: NTX

## 2022-04-24 PROCEDURE — 25000003 PHARM REV CODE 250: Mod: NTX | Performed by: INTERNAL MEDICINE

## 2022-04-24 PROCEDURE — 36415 COLL VENOUS BLD VENIPUNCTURE: CPT | Mod: NTX | Performed by: NURSE PRACTITIONER

## 2022-04-24 PROCEDURE — 99233 PR SUBSEQUENT HOSPITAL CARE,LEVL III: ICD-10-PCS | Mod: NTX,,, | Performed by: INTERNAL MEDICINE

## 2022-04-24 PROCEDURE — 83735 ASSAY OF MAGNESIUM: CPT | Mod: NTX | Performed by: NURSE PRACTITIONER

## 2022-04-24 PROCEDURE — 85025 COMPLETE CBC W/AUTO DIFF WBC: CPT | Mod: NTX | Performed by: NURSE PRACTITIONER

## 2022-04-24 PROCEDURE — 80053 COMPREHEN METABOLIC PANEL: CPT | Mod: NTX | Performed by: NURSE PRACTITIONER

## 2022-04-24 PROCEDURE — 82803 BLOOD GASES ANY COMBINATION: CPT | Mod: NTX

## 2022-04-24 PROCEDURE — 94761 N-INVAS EAR/PLS OXIMETRY MLT: CPT | Mod: NTX

## 2022-04-24 PROCEDURE — 99233 SBSQ HOSP IP/OBS HIGH 50: CPT | Mod: NTX,,, | Performed by: INTERNAL MEDICINE

## 2022-04-24 RX ADMIN — SUCRALFATE 1 G: 1 TABLET ORAL at 09:04

## 2022-04-24 RX ADMIN — INSULIN DETEMIR 5 UNITS: 100 INJECTION, SOLUTION SUBCUTANEOUS at 08:04

## 2022-04-24 RX ADMIN — ENOXAPARIN SODIUM 40 MG: 100 INJECTION SUBCUTANEOUS at 05:04

## 2022-04-24 RX ADMIN — MIRTAZAPINE 15 MG: 15 TABLET, FILM COATED ORAL at 09:04

## 2022-04-24 RX ADMIN — MILRINONE LACTATE IN DEXTROSE 0.5 MCG/KG/MIN: 200 INJECTION, SOLUTION INTRAVENOUS at 06:04

## 2022-04-24 RX ADMIN — SACUBITRIL AND VALSARTAN 1 TABLET: 49; 51 TABLET, FILM COATED ORAL at 09:04

## 2022-04-24 RX ADMIN — DIGOXIN 0.12 MG: 125 TABLET ORAL at 08:04

## 2022-04-24 RX ADMIN — FERROUS SULFATE TAB 325 MG (65 MG ELEMENTAL FE) 1 EACH: 325 (65 FE) TAB at 08:04

## 2022-04-24 RX ADMIN — SACUBITRIL AND VALSARTAN 1 TABLET: 49; 51 TABLET, FILM COATED ORAL at 08:04

## 2022-04-24 RX ADMIN — FUROSEMIDE 80 MG: 80 TABLET ORAL at 08:04

## 2022-04-24 RX ADMIN — MILRINONE LACTATE IN DEXTROSE 0.5 MCG/KG/MIN: 200 INJECTION, SOLUTION INTRAVENOUS at 11:04

## 2022-04-24 RX ADMIN — SPIRONOLACTONE 25 MG: 25 TABLET, FILM COATED ORAL at 08:04

## 2022-04-24 RX ADMIN — FUROSEMIDE 80 MG: 80 TABLET ORAL at 05:04

## 2022-04-24 RX ADMIN — ASPIRIN 81 MG CHEWABLE TABLET 81 MG: 81 TABLET CHEWABLE at 08:04

## 2022-04-24 RX ADMIN — MILRINONE LACTATE IN DEXTROSE 0.38 MCG/KG/MIN: 200 INJECTION, SOLUTION INTRAVENOUS at 09:04

## 2022-04-24 RX ADMIN — PANTOPRAZOLE SODIUM 40 MG: 40 TABLET, DELAYED RELEASE ORAL at 08:04

## 2022-04-24 RX ADMIN — ESCITALOPRAM OXALATE 5 MG: 5 TABLET, FILM COATED ORAL at 08:04

## 2022-04-24 NOTE — SUBJECTIVE & OBJECTIVE
Interval History:   -will wean milrinone gtt from 0.5 to 0.375 today for CI 2.7 and volume status, also getting frequent wide complex complex tachycardia runs which are SVT with aberrancy per EP  -daily hemodynamics   -will need to start Farxiga on discharge    AM hemodynamics  CVP 5 via picc and 7 of water on exam  SVO2 68  CI/CO 2.7/6.0      Continuous Infusions:   milrinone 20mg/100ml D5W (200mcg/ml) 0.375 mcg/kg/min (04/24/22 1120)     Scheduled Meds:   aspirin  81 mg Oral Daily    digoxin  0.125 mg Oral Daily    enoxaparin  40 mg Subcutaneous Daily    EScitalopram oxalate  5 mg Oral Daily    ferrous sulfate  1 tablet Oral Daily    furosemide  80 mg Oral BID    insulin detemir U-100  5 Units Subcutaneous Daily    mirtazapine  15 mg Oral Nightly    pantoprazole  40 mg Oral Daily    sacubitriL-valsartan  1 tablet Oral BID    spironolactone  25 mg Oral Daily    sucralfate  1 g Oral Nightly     PRN Meds:acetaminophen, dextrose 10%, dextrose 10%, glucagon (human recombinant), glucose, glucose, hydrOXYzine pamoate, insulin aspart U-100, LIDOcaine-EPINEPHrine 1%-1:100,000, methocarbamoL, sodium chloride 0.9%    Review of patient's allergies indicates:   Allergen Reactions    Bumex [bumetanide] Hives    Lactose Diarrhea    Torsemide Hives     Objective:     Vital Signs (Most Recent):  Temp: 97.9 °F (36.6 °C) (04/24/22 1100)  Pulse: (!) 112 (Simultaneous filing. User may not have seen previous data.) (04/24/22 1100)  Resp: 18 (04/24/22 1100)  BP: 99/66 (04/24/22 1100)  SpO2: 96 % (04/24/22 1100)   Vital Signs (24h Range):  Temp:  [96.5 °F (35.8 °C)-98.4 °F (36.9 °C)] 97.9 °F (36.6 °C)  Pulse:  [] 112  Resp:  [18-20] 18  SpO2:  [96 %-98 %] 96 %  BP: ()/(59-67) 99/66     Patient Vitals for the past 72 hrs (Last 3 readings):   Weight   04/24/22 0800 94.3 kg (207 lb 14.3 oz)   04/23/22 0759 93.4 kg (205 lb 14.6 oz)   04/22/22 0805 93 kg (205 lb 0.4 oz)     Body mass index is 25.98 kg/m².      Intake/Output  Summary (Last 24 hours) at 4/24/2022 1158  Last data filed at 4/24/2022 1021  Gross per 24 hour   Intake 1524 ml   Output 3800 ml   Net -2276 ml       Hemodynamic Parameters:  CVP:  [6 mmHg] 6 mmHg    Telemetry:   rev'd    Physical Exam  HEENT: Sclera anicteric, PERRLA, EOMI  Neck: JVP at the clavicle, no masses, good movement  CV: RRR, S1 and S2 normal, no additional heart sounds or murmurs. Pulses 2+ and equal bilaterally in radial arteries, Johnathan's normal on right. Distal pulses are 2+ and equal in the femoral, DP and PT areas bilaterally  JVD 7 cmH2O at 45 degrees  Pulm: Clear to auscultation bilaterally with symmetrical expansion. Chest wall palpated for reproduction of pain symptoms, and no pain was able to be produced on palpation or resistance exercises  GI: Abdomen soft, non-tender, good bowel sounds  Extremities: Both extremities intact and grossly normal, skin is warm, PICC in right UE   Psych: AOx3, appropriate affect  Neuro: CNII-XII intact, no focal deficits  Significant Labs:  CBC:  Recent Labs   Lab 04/22/22  0355 04/23/22 0435 04/24/22  0448   WBC 8.17 7.35 6.59   RBC 4.72 4.58* 4.41*   HGB 14.6 14.4 13.9*   HCT 44.1 43.0 41.4    268 263   MCV 93 94 94   MCH 30.9 31.4* 31.5*   MCHC 33.1 33.5 33.6     BNP:  Recent Labs   Lab 04/18/22  1403   *     CMP:  Recent Labs   Lab 04/22/22  0355 04/23/22  0435 04/23/22 2113 04/24/22  0448   *  167* 204* 309* 152*   CALCIUM 10.2  10.4 10.0 9.7 10.0   ALBUMIN 4.4  4.3 4.3  --  4.0   PROT 8.5*  8.4 8.0  --  7.6   *  134* 135* 135* 136   K 4.0  4.1 4.2 4.8 4.1   CO2 28  25 28 27 26   CL 96  95 96 95 97   BUN 34*  34* 32* 29* 28*   CREATININE 1.7*  1.6* 1.6* 1.9* 1.7*   ALKPHOS 94  92 95  --  84   ALT 47*  47* 48*  --  53*   AST 45*  43* 41*  --  44*   BILITOT 0.6  0.6 0.5  --  0.5      Coagulation:   Recent Labs   Lab 04/19/22  0430   INR 1.1   APTT 27.6     LDH:  No results for input(s): LDH in the last 72  hours.  Microbiology:  Microbiology Results (last 7 days)       ** No results found for the last 168 hours. **            I have reviewed all pertinent labs within the past 24 hours.    Estimated Creatinine Clearance: 71.1 mL/min (A) (based on SCr of 1.7 mg/dL (H)).    Diagnostic Results:  I have reviewed all pertinent imaging results/findings within the past 24 hours.

## 2022-04-24 NOTE — ASSESSMENT & PLAN NOTE
NIDCM , on home Milrinone, S/P ICD, h/o polysubstance abuse, tox screen 4/8 -ve, admitted with ADHF and for consideration for advanced options w/u:    -RHC on Milrinone 0.25 mcg on 4/8: RA 15, PA 59/35 (43), W 33 and CO/CI 3.27/1.47  -TTE on Milrinone 0.25 mcg on 4/8: LVEDD 7, LVEF 10%, grade 3 diastolic dysfunction, RV severely dilated, mod to severely depressed, mod-severe MR, mild TR, PAS > 50 and IVC 8  - Admit labs showed sCr 1.7 (baseline of ~ 1.6)  - Increased Milrinone to 0.375 mcg/kg/min on admit. Milrinone then increased to 0.5 mcg/kg/min overnight 2/2 drop in sVO2. sVO2 has since improved to 57 with CO/CI 5.05/2.23 and SVR 1124  - GDMT; Hold Toprol given decompensation. Continue Entresto 49/51, Dig and Aldactone  - approved for VAD 4/20, will have to stay smoking free x 6 months to be considered for OHT, quit in 4/2020    RHC 4/19 on 0.5 gtt milrinone   · RA 11 PA 50/27 (35) PCWP 27 CO 3.7 l/min CI 1.7 l/min/m2.  · PVR 2.2 CHRISTENSEN    Plan  -80 po lasix bid (transitioned from IV diuresis on 4/23)  -milrinone 0.5 gtt, weaned to 0.375 on 4/23  -K>4, Mg>2, tele  -on entresto 24/26 bid for unloading, increased to 49/51 4/21   -on dig and aldactone 25 daily

## 2022-04-24 NOTE — PLAN OF CARE
Problem: Adult Inpatient Plan of Care  Goal: Patient-Specific Goal (Individualized)  Outcome: Ongoing, Progressing  Flowsheets (Taken 4/24/2022 8200)  Anxieties, Fears or Concerns: None voiced  Individualized Care Needs: LVAD workup. Milrinone gtt. Bg protocol maintained. Monitor HR  Patient-Specific Goals (Include Timeframe): Pt will be free of falls and injuries throughout the shift.

## 2022-04-24 NOTE — PLAN OF CARE
Problem: Adult Inpatient Plan of Care  Goal: Plan of Care Review  Outcome: Ongoing, Progressing  Goal: Patient-Specific Goal (Individualized)  Outcome: Ongoing, Progressing  Goal: Absence of Hospital-Acquired Illness or Injury  Outcome: Ongoing, Progressing  Goal: Optimal Comfort and Wellbeing  Outcome: Ongoing, Progressing  Goal: Readiness for Transition of Care  Outcome: Ongoing, Progressing     Problem: Infection  Goal: Absence of Infection Signs and Symptoms  Outcome: Ongoing, Progressing     Problem: Adjustment to Illness (Heart Failure)  Goal: Optimal Coping  Outcome: Ongoing, Progressing

## 2022-04-24 NOTE — PROGRESS NOTES
[facility]  Heart Transplant  Progress Note    Patient Name: Kevan Queen  MRN: [id number]  Admission Date: 4/12/2022  Hospital Length of Stay: 12 days  Attending Physician: Luca Lopez Jr.,*  Primary Care Provider: ORALIA Cline  Principal Problem:Acute on chronic combined systolic and diastolic heart failure, NYHA class 4    Subjective:     Interval History:   -will wean milrinone gtt from 0.5 to 0.375 today with CI 2.7 and good volume status with CVP 5 mmHg, also getting frequent wide complex complex tachycardia runs which are SVT with aberrancy per EP  -daily hemodynamics   -will need to start Farxiga on discharge  -already switched to PO diuretics    AM hemodynamics  CVP 5 via picc and 7 of water on exam  SVO2 68  CI/CO 2.7/6.0      Continuous Infusions:   milrinone 20mg/100ml D5W (200mcg/ml) 0.375 mcg/kg/min (04/24/22 1120)     Scheduled Meds:   aspirin  81 mg Oral Daily    digoxin  0.125 mg Oral Daily    enoxaparin  40 mg Subcutaneous Daily    EScitalopram oxalate  5 mg Oral Daily    ferrous sulfate  1 tablet Oral Daily    furosemide  80 mg Oral BID    insulin detemir U-100  5 Units Subcutaneous Daily    mirtazapine  15 mg Oral Nightly    pantoprazole  40 mg Oral Daily    sacubitriL-valsartan  1 tablet Oral BID    spironolactone  25 mg Oral Daily    sucralfate  1 g Oral Nightly     PRN Meds:acetaminophen, dextrose 10%, dextrose 10%, glucagon (human recombinant), glucose, glucose, hydrOXYzine pamoate, insulin aspart U-100, LIDOcaine-EPINEPHrine 1%-1:100,000, methocarbamoL, sodium chloride 0.9%    Review of patient's allergies indicates:   Allergen Reactions    Bumex [bumetanide] Hives    Lactose Diarrhea    Torsemide Hives     Objective:     Vital Signs (Most Recent):  Temp: 97.9 °F (36.6 °C) (04/24/22 1100)  Pulse: (!) 112 (Simultaneous filing. User may not have seen previous data.) (04/24/22 1100)  Resp: 18 (04/24/22 1100)  BP: 99/66 (04/24/22  1100)  SpO2: 96 % (04/24/22 1100)   Vital Signs (24h Range):  Temp:  [96.5 °F (35.8 °C)-98.4 °F (36.9 °C)] 97.9 °F (36.6 °C)  Pulse:  [] 112  Resp:  [18-20] 18  SpO2:  [96 %-98 %] 96 %  BP: ()/(59-67) 99/66     Patient Vitals for the past 72 hrs (Last 3 readings):   Weight   04/24/22 0800 94.3 kg (207 lb 14.3 oz)   04/23/22 0759 93.4 kg (205 lb 14.6 oz)   04/22/22 0805 93 kg (205 lb 0.4 oz)     Body mass index is 25.98 kg/m².      Intake/Output Summary (Last 24 hours) at 4/24/2022 1158  Last data filed at 4/24/2022 1021  Gross per 24 hour   Intake 1524 ml   Output 3800 ml   Net -2276 ml       Hemodynamic Parameters:  CVP:  [6 mmHg] 6 mmHg    Telemetry:   rev'd    Physical Exam  HEENT: Sclera anicteric, PERRLA, EOMI  Neck: JVP at the clavicle, no masses, good movement  CV: RRR, S1 and S2 normal, no additional heart sounds or murmurs. Pulses 2+ and equal bilaterally in radial arteries, Johnathan's normal on right. Distal pulses are 2+ and equal in the femoral, DP and PT areas bilaterally  JVD 7 cmH2O at 45 degrees  Pulm: Clear to auscultation bilaterally with symmetrical expansion. Chest wall palpated for reproduction of pain symptoms, and no pain was able to be produced on palpation or resistance exercises  GI: Abdomen soft, non-tender, good bowel sounds  Extremities: Both extremities intact and grossly normal, skin is warm, PICC in right UE   Psych: AOx3, appropriate affect  Neuro: CNII-XII intact, no focal deficits  Significant Labs:  CBC:  Recent Labs   Lab 04/22/22  0355 04/23/22  0435 04/24/22  0448   WBC 8.17 7.35 6.59   RBC 4.72 4.58* 4.41*   HGB 14.6 14.4 13.9*   HCT 44.1 43.0 41.4    268 263   MCV 93 94 94   MCH 30.9 31.4* 31.5*   MCHC 33.1 33.5 33.6     BNP:  Recent Labs   Lab 04/18/22  1403   *     CMP:  Recent Labs   Lab 04/22/22  0355 04/23/22  0435 04/23/22  2113 04/24/22  0448   *  167* 204* 309* 152*   CALCIUM 10.2  10.4 10.0 9.7 10.0   ALBUMIN 4.4  4.3 4.3  --  4.0    PROT 8.5*  8.4 8.0  --  7.6   *  134* 135* 135* 136   K 4.0  4.1 4.2 4.8 4.1   CO2 28  25 28 27 26   CL 96  95 96 95 97   BUN 34*  34* 32* 29* 28*   CREATININE 1.7*  1.6* 1.6* 1.9* 1.7*   ALKPHOS 94  92 95  --  84   ALT 47*  47* 48*  --  53*   AST 45*  43* 41*  --  44*   BILITOT 0.6  0.6 0.5  --  0.5      Coagulation:   Recent Labs   Lab 04/19/22  0430   INR 1.1   APTT 27.6     LDH:  No results for input(s): LDH in the last 72 hours.  Microbiology:  Microbiology Results (last 7 days)       ** No results found for the last 168 hours. **            I have reviewed all pertinent labs within the past 24 hours.    Estimated Creatinine Clearance: 71.1 mL/min (A) (based on SCr of 1.7 mg/dL (H)).    Diagnostic Results:  I have reviewed all pertinent imaging results/findings within the past 24 hours.    Assessment and Plan:     38 yo BM with NICM, on home milrinone, hx of substance abuse who comes in for f/u. States that he has been doing well since last visit with Dr Lopez.Sleeps on 2 pillows. No PND anymore. Volume/weight has been stable. Great appetite. He did have to have his PICC line replaced as it got pulled out during sleep. He got his ICD placed last month in [place].      He claims being clean of any drugs, alcoho or tobacco for a long time. He came with his fiance that he has been living with for the last 8 years. They have 2 children in common. He has a total of 9 kids. Tox screen done 2/4/22 was -ve, but nicotine/cotinine screen was +ve same day. He admits to occasional cigarette smoking.      Underwent risk assessment with an echo and RHC on 4/8, both done on Milrinone 0.25 mcg/kg/min:    TTE: LVEDD 7, L:VEF 10%, grade 3 diastolic dysfunction, RV severely enlarge and mod to severely depressed, mod to severe MR, mild TR, PAS > 50 and IVC 8    RHC: RA 15, PAP 59/35 (43), W 33 and CO/CI 3.27/1.47.    Current GDMT: Toprol 25 mg qd, Entresto 24-26 bid, Aldactone 12.5 mg qd. Takes Lasix 80 mg  bid plus Metolazone 2.5 mg qod    He presents as a direct admit with AHD and for advanced options consideration            * Acute on chronic combined systolic and diastolic heart failure, NYHA class 4  NIDCM , on home Milrinone, S/P ICD, h/o polysubstance abuse, tox screen 4/8 -ve, admitted with ADHF and for consideration for advanced options w/u:    -RHC on Milrinone 0.25 mcg on 4/8: RA 15, PA 59/35 (43), W 33 and CO/CI 3.27/1.47  -TTE on Milrinone 0.25 mcg on 4/8: LVEDD 7, LVEF 10%, grade 3 diastolic dysfunction, RV severely dilated, mod to severely depressed, mod-severe MR, mild TR, PAS > 50 and IVC 8  - Admit labs showed sCr 1.7 (baseline of ~ 1.6)  - Increased Milrinone to 0.375 mcg/kg/min on admit. Milrinone then increased to 0.5 mcg/kg/min overnight 2/2 drop in sVO2. sVO2 has since improved to 57 with CO/CI 5.05/2.23 and SVR 1124  - GDMT; Hold Toprol given decompensation. Continue Entresto 49/51, Dig and Aldactone  - approved for VAD 4/20, will have to stay smoking free x 6 months to be considered for OHT, quit in 4/2020    RHC 4/19 on 0.5 gtt milrinone   · RA 11 PA 50/27 (35) PCWP 27 CO 3.7 l/min CI 1.7 l/min/m2.  · PVR 2.2 CHRISTENSEN    Plan  -80 po lasix bid (transitioned from IV diuresis on 4/23)  -milrinone 0.5 gtt, weaned to 0.375 on 4/23  -K>4, Mg>2, tele  -on entresto 24/26 bid for unloading, increased to 49/51 4/21   -on dig and aldactone 25 daily           Tobacco use  - quit smoking 4/24    ROC (acute kidney injury)  Cr 1.7 currently and improving, baseline around 1.4  Continue volume management per hemodynamcis    Anxiety disorder, unspecified  Psych consulted 4/20  Started lexapro 5 daily and prn vistarel per psych  Keep Remeron 15 daily         Montoya Sejal Pruitt MD  Heart Transplant  [facility]

## 2022-04-25 VITALS
HEART RATE: 95 BPM | OXYGEN SATURATION: 97 % | HEIGHT: 75 IN | RESPIRATION RATE: 8 BRPM | WEIGHT: 207.88 LBS | SYSTOLIC BLOOD PRESSURE: 97 MMHG | DIASTOLIC BLOOD PRESSURE: 63 MMHG | BODY MASS INDEX: 25.85 KG/M2 | TEMPERATURE: 98 F

## 2022-04-25 VITALS
WEIGHT: 173.75 LBS | DIASTOLIC BLOOD PRESSURE: 75 MMHG | SYSTOLIC BLOOD PRESSURE: 106 MMHG | BODY MASS INDEX: 23.03 KG/M2 | OXYGEN SATURATION: 100 % | HEIGHT: 73 IN

## 2022-04-25 LAB
ALBUMIN SERPL BCP-MCNC: 3.9 G/DL (ref 3.5–5.2)
ALLENS TEST: ABNORMAL
ALP SERPL-CCNC: 77 U/L (ref 55–135)
ALT SERPL W/O P-5'-P-CCNC: 52 U/L (ref 10–44)
ANION GAP SERPL CALC-SCNC: 12 MMOL/L (ref 8–16)
AST SERPL-CCNC: 43 U/L (ref 10–40)
BASOPHILS # BLD AUTO: 0.02 K/UL (ref 0–0.2)
BASOPHILS NFR BLD: 0.3 % (ref 0–1.9)
BILIRUB SERPL-MCNC: 0.4 MG/DL (ref 0.1–1)
BUN SERPL-MCNC: 29 MG/DL (ref 6–20)
CALCIUM SERPL-MCNC: 10.1 MG/DL (ref 8.7–10.5)
CHLORIDE SERPL-SCNC: 98 MMOL/L (ref 95–110)
CO2 SERPL-SCNC: 27 MMOL/L (ref 23–29)
CREAT SERPL-MCNC: 1.7 MG/DL (ref 0.5–1.4)
DELSYS: ABNORMAL
DIFFERENTIAL METHOD: ABNORMAL
EOSINOPHIL # BLD AUTO: 0.1 K/UL (ref 0–0.5)
EOSINOPHIL NFR BLD: 1.8 % (ref 0–8)
ERYTHROCYTE [DISTWIDTH] IN BLOOD BY AUTOMATED COUNT: 13.5 % (ref 11.5–14.5)
EST. GFR  (AFRICAN AMERICAN): 58.3 ML/MIN/1.73 M^2
EST. GFR  (NON AFRICAN AMERICAN): 50.4 ML/MIN/1.73 M^2
GLUCOSE SERPL-MCNC: 232 MG/DL (ref 70–110)
HCO3 UR-SCNC: 33.4 MMOL/L (ref 24–28)
HCT VFR BLD AUTO: 40.2 % (ref 40–54)
HGB BLD-MCNC: 13.3 G/DL (ref 14–18)
IMM GRANULOCYTES # BLD AUTO: 0.02 K/UL (ref 0–0.04)
IMM GRANULOCYTES NFR BLD AUTO: 0.3 % (ref 0–0.5)
LYMPHOCYTES # BLD AUTO: 1.8 K/UL (ref 1–4.8)
LYMPHOCYTES NFR BLD: 23.2 % (ref 18–48)
MAGNESIUM SERPL-MCNC: 2.2 MG/DL (ref 1.6–2.6)
MCH RBC QN AUTO: 31.2 PG (ref 27–31)
MCHC RBC AUTO-ENTMCNC: 33.1 G/DL (ref 32–36)
MCV RBC AUTO: 94 FL (ref 82–98)
MONOCYTES # BLD AUTO: 0.9 K/UL (ref 0.3–1)
MONOCYTES NFR BLD: 12.1 % (ref 4–15)
NEUTROPHILS # BLD AUTO: 4.7 K/UL (ref 1.8–7.7)
NEUTROPHILS NFR BLD: 62.3 % (ref 38–73)
NRBC BLD-RTO: 0 /100 WBC
PCO2 BLDA: 61.4 MMHG (ref 35–45)
PH SMN: 7.34 [PH] (ref 7.35–7.45)
PLATELET # BLD AUTO: 251 K/UL (ref 150–450)
PMV BLD AUTO: 9.9 FL (ref 9.2–12.9)
PO2 BLDA: 37 MMHG (ref 40–60)
POC BE: 8 MMOL/L
POC SATURATED O2: 66 % (ref 95–100)
POC TCO2: 35 MMOL/L (ref 24–29)
POCT GLUCOSE: 131 MG/DL (ref 70–110)
POCT GLUCOSE: 151 MG/DL (ref 70–110)
POCT GLUCOSE: 176 MG/DL (ref 70–110)
POTASSIUM SERPL-SCNC: 4.3 MMOL/L (ref 3.5–5.1)
PROT SERPL-MCNC: 7.6 G/DL (ref 6–8.4)
RBC # BLD AUTO: 4.26 M/UL (ref 4.6–6.2)
SAMPLE: ABNORMAL
SITE: ABNORMAL
SODIUM SERPL-SCNC: 137 MMOL/L (ref 136–145)
WBC # BLD AUTO: 7.58 K/UL (ref 3.9–12.7)

## 2022-04-25 PROCEDURE — 36415 COLL VENOUS BLD VENIPUNCTURE: CPT | Mod: NTX | Performed by: NURSE PRACTITIONER

## 2022-04-25 PROCEDURE — 99233 PR SUBSEQUENT HOSPITAL CARE,LEVL III: ICD-10-PCS | Mod: NTX,,, | Performed by: PHYSICIAN ASSISTANT

## 2022-04-25 PROCEDURE — 99233 SBSQ HOSP IP/OBS HIGH 50: CPT | Mod: NTX,,, | Performed by: PHYSICIAN ASSISTANT

## 2022-04-25 PROCEDURE — 85025 COMPLETE CBC W/AUTO DIFF WBC: CPT | Mod: NTX | Performed by: NURSE PRACTITIONER

## 2022-04-25 PROCEDURE — 25000003 PHARM REV CODE 250: Mod: NTX | Performed by: INTERNAL MEDICINE

## 2022-04-25 PROCEDURE — 94761 N-INVAS EAR/PLS OXIMETRY MLT: CPT | Mod: NTX

## 2022-04-25 PROCEDURE — 83735 ASSAY OF MAGNESIUM: CPT | Mod: NTX | Performed by: NURSE PRACTITIONER

## 2022-04-25 PROCEDURE — 80053 COMPREHEN METABOLIC PANEL: CPT | Mod: NTX | Performed by: NURSE PRACTITIONER

## 2022-04-25 PROCEDURE — 63600175 PHARM REV CODE 636 W HCPCS: Mod: NTX | Performed by: INTERNAL MEDICINE

## 2022-04-25 PROCEDURE — 25000003 PHARM REV CODE 250: Mod: NTX | Performed by: NURSE PRACTITIONER

## 2022-04-25 RX ORDER — PANTOPRAZOLE SODIUM 40 MG/1
40 TABLET, DELAYED RELEASE ORAL DAILY
Qty: 30 TABLET | Refills: 11 | Status: ON HOLD | OUTPATIENT
Start: 2022-04-26 | End: 2022-05-27 | Stop reason: HOSPADM

## 2022-04-25 RX ORDER — ESCITALOPRAM OXALATE 5 MG/1
5 TABLET ORAL DAILY
Qty: 30 TABLET | Refills: 11 | Status: ON HOLD | OUTPATIENT
Start: 2022-04-26 | End: 2022-05-27 | Stop reason: SDUPTHER

## 2022-04-25 RX ORDER — SPIRONOLACTONE 25 MG/1
25 TABLET ORAL DAILY
Qty: 30 TABLET | Refills: 11 | Status: SHIPPED | OUTPATIENT
Start: 2022-04-26 | End: 2022-05-27

## 2022-04-25 RX ORDER — DIGOXIN 125 MCG
0.12 TABLET ORAL DAILY
Qty: 30 TABLET | Refills: 11 | Status: SHIPPED | OUTPATIENT
Start: 2022-04-26 | End: 2022-05-27

## 2022-04-25 RX ORDER — MILRINONE LACTATE 0.2 MG/ML
0.38 INJECTION, SOLUTION INTRAVENOUS CONTINUOUS
Status: ON HOLD
Start: 2022-04-25 | End: 2023-09-11 | Stop reason: HOSPADM

## 2022-04-25 RX ADMIN — FERROUS SULFATE TAB 325 MG (65 MG ELEMENTAL FE) 1 EACH: 325 (65 FE) TAB at 08:04

## 2022-04-25 RX ADMIN — SPIRONOLACTONE 25 MG: 25 TABLET, FILM COATED ORAL at 08:04

## 2022-04-25 RX ADMIN — FUROSEMIDE 80 MG: 80 TABLET ORAL at 08:04

## 2022-04-25 RX ADMIN — PANTOPRAZOLE SODIUM 40 MG: 40 TABLET, DELAYED RELEASE ORAL at 08:04

## 2022-04-25 RX ADMIN — SACUBITRIL AND VALSARTAN 1 TABLET: 49; 51 TABLET, FILM COATED ORAL at 08:04

## 2022-04-25 RX ADMIN — INSULIN DETEMIR 5 UNITS: 100 INJECTION, SOLUTION SUBCUTANEOUS at 08:04

## 2022-04-25 RX ADMIN — ASPIRIN 81 MG CHEWABLE TABLET 81 MG: 81 TABLET CHEWABLE at 08:04

## 2022-04-25 RX ADMIN — ESCITALOPRAM OXALATE 5 MG: 5 TABLET, FILM COATED ORAL at 08:04

## 2022-04-25 RX ADMIN — DIGOXIN 0.12 MG: 125 TABLET ORAL at 08:04

## 2022-04-25 RX ADMIN — MILRINONE LACTATE IN DEXTROSE 0.38 MCG/KG/MIN: 200 INJECTION, SOLUTION INTRAVENOUS at 08:04

## 2022-04-25 NOTE — PROGRESS NOTES
DISCHARGE    SW to pt's room for discharge today.  Pt and significant other Page presents as AAO x4, calm, cooperative, and asking and answering questions appropriately.  Pt verbalizes understanding and agreement with plan for d/c to home today with HH and Milrinone.  Pt reports Page will transport her home today.  LCSW dicussed with Pt that team is also wanting to work Pt up for heart transplant and HTS SWs will need to meet a primary caregiver, Pt states that will be his mother and he will bring her to clinic within the next month.  Pt reports coping adequately at this time, and denies any needs or concerns to SW.    SW notified Zulma @ [facility] ([phone]; fax [phone]) of d/c today and confirmed they have Epic access and will need access to the local infusion suite. LCSW spoke to Zulma around 3pm who states the Milrinone is being delivered from [facility] due to cost center allocations. Zulma states he spoke to Pt about issue, LCSW informed bedside RN.  SW providing ongoing psychosocial and counseling support, education, resources, assistance, and discharge planning as indicated.  SW remains available.    [facility]   [address]  [place], [place] [address]  Phone: [phone]

## 2022-04-25 NOTE — ASSESSMENT & PLAN NOTE
NIDCM, on home Milrinone, S/P ICD, h/o polysubstance abuse, tox screen 4/8 -ve, admitted with ADHF and for consideration for advanced options w/u:    -RHC on Milrinone 0.25 mcg on 4/8: RA 15, PA 59/35 (43), W 33 and CO/CI 3.27/1.47  -TTE on Milrinone 0.25 mcg on 4/8: LVEDD 7, LVEF 10%, grade 3 diastolic dysfunction, RV severely dilated, mod to severely depressed, mod-severe MR, mild TR, PAS > 50 and IVC 8  - Admit labs showed sCr 1.7 (baseline of ~ 1.6)  - Increased Milrinone to 0.375 mcg/kg/min on admit. Milrinone then increased to 0.5 mcg/kg/min overnight 2/2 drop in sVO2. sVO2 has since improved to 57 with CO/CI 5.05/2.23 and SVR 1124  - GDMT; Hold Toprol given decompensation. Continue Entresto 49/51, Dig and Aldactone  - approved for VAD 4/20, will have to stay smoking free x 6 months to be considered for OHT, quit in 4/2020    RHC 4/19 on 0.5 gtt milrinone   · RA 11 PA 50/27 (35) PCWP 27 CO 3.7 l/min CI 1.7 l/min/m2.  · PVR 2.2 CHRISTENSEN    Plan  -Continue 80 po lasix bid (transitioned from IV diuresis on 4/23)  -milrinone 0.5 gtt, weaned to 0.375 on 4/23  -K>4, Mg>2, tele  -on entresto 24/26 bid for unloading, increased to 49/51 4/21   -on dig and aldactone 25 daily   - Will need metoprolol re-added in clinic and SGLT inhibitor.

## 2022-04-25 NOTE — PLAN OF CARE
Problem: Adult Inpatient Plan of Care  Goal: Plan of Care Review  Outcome: Ongoing, Progressing  Goal: Patient-Specific Goal (Individualized)  Outcome: Ongoing, Progressing  Goal: Absence of Hospital-Acquired Illness or Injury  Outcome: Ongoing, Progressing  Goal: Optimal Comfort and Wellbeing  Outcome: Ongoing, Progressing  Goal: Readiness for Transition of Care  Outcome: Ongoing, Progressing     Problem: Infection  Goal: Absence of Infection Signs and Symptoms  Outcome: Ongoing, Progressing     Problem: Adjustment to Illness (Heart Failure)  Goal: Optimal Coping  Outcome: Ongoing, Progressing     Problem: Cardiac Output Decreased (Heart Failure)  Goal: Optimal Cardiac Output  Outcome: Ongoing, Progressing     Problem: Dysrhythmia (Heart Failure)  Goal: Stable Heart Rate and Rhythm  Outcome: Ongoing, Progressing     Problem: Fluid Imbalance (Heart Failure)  Goal: Fluid Balance  Outcome: Ongoing, Progressing

## 2022-04-25 NOTE — SUBJECTIVE & OBJECTIVE
Interval History:   -Hemodynamically stable with dropping of milrinone from .5 to .375. Creatinine stable at 1.7. Transitioned from IV lasix to PO 80 mg BID and has adequate UOP (net negative 1.6L yesterday.)     AM hemodynamics  CVP 6 via picc   SVO2 66%  CI/CO 2.6/5.7      Continuous Infusions:   milrinone 20mg/100ml D5W (200mcg/ml) 0.375 mcg/kg/min (04/25/22 0830)     Scheduled Meds:   aspirin  81 mg Oral Daily    digoxin  0.125 mg Oral Daily    enoxaparin  40 mg Subcutaneous Daily    EScitalopram oxalate  5 mg Oral Daily    ferrous sulfate  1 tablet Oral Daily    furosemide  80 mg Oral BID    insulin detemir U-100  5 Units Subcutaneous Daily    mirtazapine  15 mg Oral Nightly    pantoprazole  40 mg Oral Daily    sacubitriL-valsartan  1 tablet Oral BID    spironolactone  25 mg Oral Daily    sucralfate  1 g Oral Nightly     PRN Meds:acetaminophen, dextrose 10%, dextrose 10%, glucagon (human recombinant), glucose, glucose, hydrOXYzine pamoate, insulin aspart U-100, LIDOcaine-EPINEPHrine 1%-1:100,000, methocarbamoL, sodium chloride 0.9%    Review of patient's allergies indicates:   Allergen Reactions    Bumex [bumetanide] Hives    Lactose Diarrhea    Torsemide Hives     Objective:     Vital Signs (Most Recent):  Temp: 97.3 °F (36.3 °C) (04/25/22 0818)  Pulse: 92 (04/25/22 1123)  Resp: 11 (04/25/22 0800)  BP: 107/69 (04/25/22 0800)  SpO2: (!) 94 % (04/25/22 0800)   Vital Signs (24h Range):  Temp:  [96.5 °F (35.8 °C)-98.8 °F (37.1 °C)] 97.3 °F (36.3 °C)  Pulse:  [] 92  Resp:  [11-20] 11  SpO2:  [94 %-98 %] 94 %  BP: ()/(57-70) 107/69     Patient Vitals for the past 72 hrs (Last 3 readings):   Weight   04/24/22 0800 94.3 kg (207 lb 14.3 oz)   04/23/22 0759 93.4 kg (205 lb 14.6 oz)       Body mass index is 25.98 kg/m².      Intake/Output Summary (Last 24 hours) at 4/25/2022 1125  Last data filed at 4/24/2022 2000  Gross per 24 hour   Intake 222 ml   Output 1500 ml   Net -1278 ml         Hemodynamic  Parameters:  CVP:  [6 mmHg] 6 mmHg    Telemetry:   rev'd    Physical Exam  Constitutional:       Appearance: Normal appearance.   HENT:      Head: Normocephalic and atraumatic.   Neck:      Vascular: No JVD.      Comments: Necks veins not elevated.   Cardiovascular:      Rate and Rhythm: Normal rate.      Pulses: Normal pulses.      Heart sounds: Normal heart sounds.   Pulmonary:      Effort: Pulmonary effort is normal. No respiratory distress.      Breath sounds: Normal breath sounds. No wheezing or rales.   Abdominal:      General: Bowel sounds are normal. There is no distension.      Palpations: Abdomen is soft.      Tenderness: There is no abdominal tenderness.   Musculoskeletal:         General: Normal range of motion.      Cervical back: Normal range of motion and neck supple.      Right lower leg: No edema.      Left lower leg: No edema.   Skin:     General: Skin is warm and dry.      Capillary Refill: Capillary refill takes less than 2 seconds.   Neurological:      General: No focal deficit present.      Mental Status: He is alert and oriented to person, place, and time.   Psychiatric:         Mood and Affect: Mood normal.         Behavior: Behavior normal.         CBC:  Recent Labs   Lab 04/23/22  0435 04/24/22  0448 04/25/22  0357   WBC 7.35 6.59 7.58   RBC 4.58* 4.41* 4.26*   HGB 14.4 13.9* 13.3*   HCT 43.0 41.4 40.2    263 251   MCV 94 94 94   MCH 31.4* 31.5* 31.2*   MCHC 33.5 33.6 33.1       BNP:  Recent Labs   Lab 04/18/22  1403   *       CMP:  Recent Labs   Lab 04/23/22  0435 04/23/22  2113 04/24/22  0448 04/25/22  0357   * 309* 152* 232*   CALCIUM 10.0 9.7 10.0 10.1   ALBUMIN 4.3  --  4.0 3.9   PROT 8.0  --  7.6 7.6   * 135* 136 137   K 4.2 4.8 4.1 4.3   CO2 28 27 26 27   CL 96 95 97 98   BUN 32* 29* 28* 29*   CREATININE 1.6* 1.9* 1.7* 1.7*   ALKPHOS 95  --  84 77   ALT 48*  --  53* 52*   AST 41*  --  44* 43*   BILITOT 0.5  --  0.5 0.4        Coagulation:   Recent Labs    Lab 04/19/22  0430   INR 1.1   APTT 27.6       LDH:  No results for input(s): LDH in the last 72 hours.  Microbiology:  Microbiology Results (last 7 days)       ** No results found for the last 168 hours. **            I have reviewed all pertinent labs within the past 24 hours.    Estimated Creatinine Clearance: 71.1 mL/min (A) (based on SCr of 1.7 mg/dL (H)).    Diagnostic Results:  I have reviewed all pertinent imaging results/findings within the past 24 hours.

## 2022-04-25 NOTE — HOSPITAL COURSE
Mr. Queen was admitted to [facility] on 4/12 for treatment for his acute decompensated heart failure and for consideration for advanced therapies (VAD/OHTx) for Tx of his end stage OHTx. He was diuresed w/ IV lasix to a current dry weight of 94.3 kg. He underwent workup for OHTx/VAD and was approved on 4/20 for LVAD only (smoking until April precludes OHTx currently). He is being discharged on 4/25/22 on current GDMT of entresto 49-51 BID, aldactone 25 daily, and digoxin 0.125 mg daily. Going home on milrinone at 0.375 mcg/kg/min and 80 mg PO lasix BID. Will need to be restarted on Toprol and started on Farxiga as outpatient. Will need [facility] clinic f/u in 1-2 weeks.

## 2022-04-25 NOTE — DISCHARGE SUMMARY
[facility] - Cardiology Stepdown  Heart Transplant  Discharge Summary      Patient Name: Kevan Queen  MRN: [id number]  Admission Date: 4/12/2022  Hospital Length of Stay: 13 days  Discharge Date and Time: 04/25/2022 10:56 AM  Attending Physician: Luca Lopez Jr.,*   Discharging Provider: Jeff Spencer PA-C  Primary Care Provider: ORALIA Cline     HPI: 38 yo BM with NICM, on home milrinone, hx of substance abuse who comes in for f/u. States that he has been doing well since last visit with Dr Lopez.Sleeps on 2 pillows. No PND anymore. Volume/weight has been stable. Great appetite. He did have to have his PICC line replaced as it got pulled out during sleep. He got his ICD placed last month in [place].      He claims being clean of any drugs, alcoho or tobacco for a long time. He came with his fiance that he has been living with for the last 8 years. They have 2 children in common. He has a total of 9 kids. Tox screen done 2/4/22 was -ve, but nicotine/cotinine screen was +ve same day. He admits to occasional cigarette smoking.      Underwent risk assessment with an echo and RHC on 4/8, both done on Milrinone 0.25 mcg/kg/min:    TTE: LVEDD 7, L:VEF 10%, grade 3 diastolic dysfunction, RV severely enlarge and mod to severely depressed, mod to severe MR, mild TR, PAS > 50 and IVC 8    RHC: RA 15, PAP 59/35 (43), W 33 and CO/CI 3.27/1.47.    Current GDMT: Toprol 25 mg qd, Entresto 24-26 bid, Aldactone 12.5 mg qd. Takes Lasix 80 mg bid plus Metolazone 2.5 mg qod    He presents as a direct admit with AHD and for advanced options consideration            Procedure(s) (LRB):  INSERTION, CATHETER, RIGHT HEART (Right)     Hospital Course: Mr. Queen was admitted to [facility] on 4/12 for treatment for his acute decompensated heart failure and for consideration for advanced therapies (VAD/OHTx) for Tx of his end stage OHTx. He was diuresed w/ IV lasix to a current dry weight of 94.3 kg. He underwent workup for OHTx/VAD  and was approved on 4/20 for LVAD only (smoking until April precludes OHTx currently). He is being discharged on 4/25/22 on current GDMT of entresto 49-51 BID, aldactone 25 daily, and digoxin 0.125 mg daily. Going home on milrinone at 0.375 mcg/kg/min and 80 mg PO lasix BID. Will need to be restarted on Toprol and started on Farxiga as outpatient. Will need [facility] clinic f/u in 1-2 weeks.       Goals of Care Treatment Preferences:  Code Status: Full Code      Consults (From admission, onward)        Status Ordering Provider     Inpatient consult to Psychology  Once        Provider:  Filemon Reed, PhD    Completed SRINI MEDELLIN     Inpatient consult to Psychiatry  Once        Provider:  (Not yet assigned)    Completed GISELE VICKERS     Inpatient consult to Registered Dietitian/Nutritionist  Once        Provider:  (Not yet assigned)    Completed BEN ROBERT     Inpatient Consult to Transplant Coordinator  Once        Provider:  (Not yet assigned)    Acknowledged LAI OLMOS     Inpatient Consult to Pre-VAD Coordinator  Once        Provider:  (Not yet assigned)    Completed LAI OLMOS     Inpatient consult to Cardiothoracic Surgery  Once        Provider:  (Not yet assigned)    Completed LAI OLMOS          Significant Diagnostic Studies: Labs: All labs within the past 24 hours have been reviewed    Pending Diagnostic Studies:     Procedure Component Value Units Date/Time    APTT [[id number]] Collected: 04/19/22 0428    Order Status: Sent Lab Status: In process Updated: 04/19/22 0430    Specimen: Blood     Basic metabolic panel  [[id number]] Collected: 04/19/22 0429    Order Status: Sent Lab Status: In process Updated: 04/19/22 0430    Specimen: Blood     C-reactive protein [[id number]] Collected: 04/19/22 0429    Order Status: Sent Lab Status: In process Updated: 04/19/22 0430    Specimen: Blood     CBC auto differential [[id number]] Collected: 04/19/22 0428    Order Status: Sent  Lab Status: In process Updated: 04/19/22 0430    Specimen: Blood     Cardiolipin antibody [[id number]] Collected: 04/19/22 0429    Order Status: Sent Lab Status: In process Updated: 04/19/22 0430    Specimen: Blood     Comprehensive metabolic panel - if not done in ED [[id number]] Collected: 04/19/22 0429    Order Status: Sent Lab Status: In process Updated: 04/19/22 0430    Specimen: Blood     DRVVT [[id number]] Collected: 04/19/22 0428    Order Status: Sent Lab Status: In process Updated: 04/19/22 0430    Specimen: Blood     Factor 8 assay [[id number]] Collected: 04/19/22 0428    Order Status: Sent Lab Status: In process Updated: 04/19/22 0430    Specimen: Blood     Fibrinogen [[id number]] Collected: 04/19/22 0428    Order Status: Sent Lab Status: In process Updated: 04/19/22 0430    Specimen: Blood     G6PD,Quantitative [[id number]] Collected: 04/19/22 0429    Order Status: Sent Lab Status: In process Updated: 04/19/22 0553    Specimen: Blood     Magnesium - if not done in ED [[id number]] Collected: 04/19/22 0429    Order Status: Sent Lab Status: In process Updated: 04/19/22 0430    Specimen: Blood     Platelet Function Assay-EPI [[id number]] Collected: 04/19/22 0428    Order Status: Sent Lab Status: In process Updated: 04/19/22 0430    Specimen: Blood     Protein S antigen, free [[id number]] Collected: 04/19/22 0428    Order Status: Sent Lab Status: In process Updated: 04/19/22 0430    Specimen: Blood     Protein S antigen, total [[id number]] Collected: 04/19/22 0428    Order Status: Sent Lab Status: In process Updated: 04/19/22 0430    Specimen: Blood     Prothrombin gene mutation [[id number]] Collected: 04/19/22 0429    Order Status: Sent Lab Status: In process Updated: 04/19/22 0430    Specimen: Blood     Protime-INR [[id number]] Collected: 04/19/22 0428    Order Status: Sent Lab Status: In process Updated: 04/19/22 0430    Specimen: Blood     TSH [[id number]] Collected: 04/19/22 0429    Order Status: Sent Lab Status: In  process Updated: 04/19/22 0430    Specimen: Blood     Von Willebrand antigen [[id number]] Collected: 04/19/22 0429    Order Status: Sent Lab Status: In process Updated: 04/19/22 0430    Specimen: Blood     Von Willebrand multimeric [[id number]] Collected: 04/19/22 0428    Order Status: Sent Lab Status: In process Updated: 04/19/22 0808    Specimen: Blood         Final Active Diagnoses:    Diagnosis Date Noted POA    PRINCIPAL PROBLEM:  Acute on chronic combined systolic and diastolic heart failure, NYHA class 4 [I50.43] 10/25/2020 Yes    Cardiogenic shock [R57.0]  Unknown    Tobacco use [Z72.0] 04/12/2022 Unknown    Dilated cardiomyopathy [I42.0] 10/26/2020 Yes    ICD (implantable cardioverter-defibrillator) in place [Z95.810] 10/26/2020 Unknown    ROC (acute kidney injury) [N17.9] 10/26/2020 Unknown    Anxiety disorder, unspecified [F41.9] 10/25/2020 Yes      Problems Resolved During this Admission:      Discharged Condition: stable    Disposition: Home or Self Care    Follow Up:    Patient Instructions:   No discharge procedures on file.  Medications:  Reconciled Home Medications:      Medication List      START taking these medications    digoxin 125 mcg tablet  Commonly known as: LANOXIN  Take 1 tablet (0.125 mg total) by mouth once daily.  Start taking on: April 26, 2022     EScitalopram oxalate 5 MG Tab  Commonly known as: LEXAPRO  Take 1 tablet (5 mg total) by mouth once daily.  Start taking on: April 26, 2022     milrinone 20mg/100ml D5W (200mcg/ml) 20 mg/100 mL (200 mcg/mL) infusion  Commonly known as: PRIMACOR  Inject 34.875 mcg/min into the vein continuous.     sacubitriL-valsartan 49-51 mg per tablet  Commonly known as: ENTRESTO  Take 1 tablet by mouth 2 (two) times daily.  Replaces: ENTRESTO 24-26 mg per tablet        CHANGE how you take these medications    pantoprazole 40 MG tablet  Commonly known as: PROTONIX  Take 1 tablet (40 mg total) by mouth once daily.  Start taking on: April 26,  2022  What changed: See the new instructions.     spironolactone 25 MG tablet  Commonly known as: ALDACTONE  Take 1 tablet (25 mg total) by mouth once daily.  Start taking on: April 26, 2022  What changed: how much to take        CONTINUE taking these medications    albuterol 90 mcg/actuation inhaler  Commonly known as: PROVENTIL/VENTOLIN HFA  INHALE 2 PUFFS BY MOUTH EVERY 4 HOURS AS NEEDED FOR COUGH OR WHEEZING     aspirin 81 MG Chew  Take 81 mg by mouth once daily.     busPIRone 7.5 MG tablet  Commonly known as: BUSPAR  Take 7.5 mg by mouth every 12 (twelve) hours.     ferrous sulfate 325 (65 FE) MG EC tablet  Take 1 tablet (325 mg total) by mouth once daily.     furosemide 80 MG tablet  Commonly known as: LASIX  Take 80 mg by mouth 2 (two) times daily.     mirtazapine 15 MG tablet  Commonly known as: REMERON  Take 15 mg by mouth nightly.     prochlorperazine 5 MG tablet  Commonly known as: COMPAZINE  Take 5 mg by mouth every 6 (six) hours as needed.     promethazine 12.5 MG Tab  Commonly known as: PHENERGAN  Take 12.5 mg by mouth every 6 (six) hours as needed.     sucralfate 1 gram tablet  Commonly known as: CARAFATE  Take 1 g by mouth nightly.     traMADoL 50 mg tablet  Commonly known as: ULTRAM  Take 50 mg by mouth every 6 (six) hours as needed.        STOP taking these medications    ENTRESTO 24-26 mg per tablet  Generic drug: sacubitriL-valsartan  Replaced by: sacubitriL-valsartan 49-51 mg per tablet     metOLazone 2.5 MG tablet  Commonly known as: ZAROXOLYN     metoprolol succinate 25 MG 24 hr tablet  Commonly known as: TOPROL-XL     milrinone 1 mg/mL injection  Commonly known as: PRIMACOR     potassium chloride 20 mEq  Commonly known as: K-TAB     sodium chloride 0.9% injection  Commonly known as: NORMAL SALINE FLUSH            Jeff Spencer PA-C  Heart Transplant  [facility] - Cardiology Stepdown

## 2022-04-25 NOTE — PROGRESS NOTES
[facility] - Cardiology Stepdown  Heart Transplant  Progress Note    Patient Name: Kevan Queen  MRN: [id number]  Admission Date: 4/12/2022  Hospital Length of Stay: 13 days  Attending Physician: Luca Lopez Jr.,*  Primary Care Provider: ORALIA Cline  Principal Problem:Acute on chronic combined systolic and diastolic heart failure, NYHA class 4    Subjective:     Interval History:   -Hemodynamically stable with dropping of milrinone from .5 to .375. Creatinine stable at 1.7. Transitioned from IV lasix to PO 80 mg BID and has adequate UOP (net negative 1.6L yesterday.)     AM hemodynamics  CVP 6 via picc   SVO2 66%  CI/CO 2.6/5.7      Continuous Infusions:   milrinone 20mg/100ml D5W (200mcg/ml) 0.375 mcg/kg/min (04/25/22 0830)     Scheduled Meds:   aspirin  81 mg Oral Daily    digoxin  0.125 mg Oral Daily    enoxaparin  40 mg Subcutaneous Daily    EScitalopram oxalate  5 mg Oral Daily    ferrous sulfate  1 tablet Oral Daily    furosemide  80 mg Oral BID    insulin detemir U-100  5 Units Subcutaneous Daily    mirtazapine  15 mg Oral Nightly    pantoprazole  40 mg Oral Daily    sacubitriL-valsartan  1 tablet Oral BID    spironolactone  25 mg Oral Daily    sucralfate  1 g Oral Nightly     PRN Meds:acetaminophen, dextrose 10%, dextrose 10%, glucagon (human recombinant), glucose, glucose, hydrOXYzine pamoate, insulin aspart U-100, LIDOcaine-EPINEPHrine 1%-1:100,000, methocarbamoL, sodium chloride 0.9%    Review of patient's allergies indicates:   Allergen Reactions    Bumex [bumetanide] Hives    Lactose Diarrhea    Torsemide Hives     Objective:     Vital Signs (Most Recent):  Temp: 97.3 °F (36.3 °C) (04/25/22 0818)  Pulse: 92 (04/25/22 1123)  Resp: 11 (04/25/22 0800)  BP: 107/69 (04/25/22 0800)  SpO2: (!) 94 % (04/25/22 0800)   Vital Signs (24h Range):  Temp:  [96.5 °F (35.8 °C)-98.8 °F (37.1 °C)] 97.3 °F (36.3 °C)  Pulse:  [] 92  Resp:  [11-20] 11  SpO2:  [94 %-98 %] 94 %  BP:  ()/(57-70) 107/69     Patient Vitals for the past 72 hrs (Last 3 readings):   Weight   04/24/22 0800 94.3 kg (207 lb 14.3 oz)   04/23/22 0759 93.4 kg (205 lb 14.6 oz)       Body mass index is 25.98 kg/m².      Intake/Output Summary (Last 24 hours) at 4/25/2022 1125  Last data filed at 4/24/2022 2000  Gross per 24 hour   Intake 222 ml   Output 1500 ml   Net -1278 ml         Hemodynamic Parameters:  CVP:  [6 mmHg] 6 mmHg    Telemetry:   rev'd    Physical Exam  Constitutional:       Appearance: Normal appearance.   HENT:      Head: Normocephalic and atraumatic.   Neck:      Vascular: No JVD.      Comments: Necks veins not elevated.   Cardiovascular:      Rate and Rhythm: Normal rate.      Pulses: Normal pulses.      Heart sounds: Normal heart sounds.   Pulmonary:      Effort: Pulmonary effort is normal. No respiratory distress.      Breath sounds: Normal breath sounds. No wheezing or rales.   Abdominal:      General: Bowel sounds are normal. There is no distension.      Palpations: Abdomen is soft.      Tenderness: There is no abdominal tenderness.   Musculoskeletal:         General: Normal range of motion.      Cervical back: Normal range of motion and neck supple.      Right lower leg: No edema.      Left lower leg: No edema.   Skin:     General: Skin is warm and dry.      Capillary Refill: Capillary refill takes less than 2 seconds.   Neurological:      General: No focal deficit present.      Mental Status: He is alert and oriented to person, place, and time.   Psychiatric:         Mood and Affect: Mood normal.         Behavior: Behavior normal.         CBC:  Recent Labs   Lab 04/23/22  0435 04/24/22  0448 04/25/22  0357   WBC 7.35 6.59 7.58   RBC 4.58* 4.41* 4.26*   HGB 14.4 13.9* 13.3*   HCT 43.0 41.4 40.2    263 251   MCV 94 94 94   MCH 31.4* 31.5* 31.2*   MCHC 33.5 33.6 33.1       BNP:  Recent Labs   Lab 04/18/22  1403   *       CMP:  Recent Labs   Lab 04/23/22  0435 04/23/22 2113  04/24/22  0448 04/25/22  0357   * 309* 152* 232*   CALCIUM 10.0 9.7 10.0 10.1   ALBUMIN 4.3  --  4.0 3.9   PROT 8.0  --  7.6 7.6   * 135* 136 137   K 4.2 4.8 4.1 4.3   CO2 28 27 26 27   CL 96 95 97 98   BUN 32* 29* 28* 29*   CREATININE 1.6* 1.9* 1.7* 1.7*   ALKPHOS 95  --  84 77   ALT 48*  --  53* 52*   AST 41*  --  44* 43*   BILITOT 0.5  --  0.5 0.4        Coagulation:   Recent Labs   Lab 04/19/22  0430   INR 1.1   APTT 27.6       LDH:  No results for input(s): LDH in the last 72 hours.  Microbiology:  Microbiology Results (last 7 days)       ** No results found for the last 168 hours. **            I have reviewed all pertinent labs within the past 24 hours.    Estimated Creatinine Clearance: 71.1 mL/min (A) (based on SCr of 1.7 mg/dL (H)).    Diagnostic Results:  I have reviewed all pertinent imaging results/findings within the past 24 hours.    Assessment and Plan:     38 yo BM with NICM, on home milrinone, hx of substance abuse who comes in for f/u. States that he has been doing well since last visit with Dr Lopez.Sleeps on 2 pillows. No PND anymore. Volume/weight has been stable. Great appetite. He did have to have his PICC line replaced as it got pulled out during sleep. He got his ICD placed last month in [place].      He claims being clean of any drugs, alcoho or tobacco for a long time. He came with his fiance that he has been living with for the last 8 years. They have 2 children in common. He has a total of 9 kids. Tox screen done 2/4/22 was -ve, but nicotine/cotinine screen was +ve same day. He admits to occasional cigarette smoking.      Underwent risk assessment with an echo and RHC on 4/8, both done on Milrinone 0.25 mcg/kg/min:    TTE: LVEDD 7, L:VEF 10%, grade 3 diastolic dysfunction, RV severely enlarge and mod to severely depressed, mod to severe MR, mild TR, PAS > 50 and IVC 8    RHC: RA 15, PAP 59/35 (43), W 33 and CO/CI 3.27/1.47.    Current GDMT: Toprol 25 mg qd, Entresto  24-26 bid, Aldactone 12.5 mg qd. Takes Lasix 80 mg bid plus Metolazone 2.5 mg qod    He presents as a direct admit with AHD and for advanced options consideration            * Acute on chronic combined systolic and diastolic heart failure, NYHA class 4  NIDCM, on home Milrinone, S/P ICD, h/o polysubstance abuse, tox screen 4/8 -ve, admitted with ADHF and for consideration for advanced options w/u:    -RHC on Milrinone 0.25 mcg on 4/8: RA 15, PA 59/35 (43), W 33 and CO/CI 3.27/1.47  -TTE on Milrinone 0.25 mcg on 4/8: LVEDD 7, LVEF 10%, grade 3 diastolic dysfunction, RV severely dilated, mod to severely depressed, mod-severe MR, mild TR, PAS > 50 and IVC 8  - Admit labs showed sCr 1.7 (baseline of ~ 1.6)  - Increased Milrinone to 0.375 mcg/kg/min on admit. Milrinone then increased to 0.5 mcg/kg/min overnight 2/2 drop in sVO2. sVO2 has since improved to 57 with CO/CI 5.05/2.23 and SVR 1124  - GDMT; Hold Toprol given decompensation. Continue Entresto 49/51, Dig and Aldactone  - approved for VAD 4/20, will have to stay smoking free x 6 months to be considered for OHT, quit in 4/2020    RHC 4/19 on 0.5 gtt milrinone   · RA 11 PA 50/27 (35) PCWP 27 CO 3.7 l/min CI 1.7 l/min/m2.  · PVR 2.2 CHRISTENSEN    Plan  -Continue 80 po lasix bid (transitioned from IV diuresis on 4/23)  -milrinone 0.5 gtt, weaned to 0.375 on 4/23  -K>4, Mg>2, tele  -on entresto 24/26 bid for unloading, increased to 49/51 4/21   -on dig and aldactone 25 daily   - Will need metoprolol re-added in clinic and SGLT inhibitor.           Tobacco use  - Continues to smoke occasionally.     ROC (acute kidney injury)  Cr 1.7 currently and improving, baseline around 1.4  Continue volume management per hemodynamcis    Anxiety disorder, unspecified  Psych consulted 4/20  Started lexapro 5 daily and prn vistarel per psych  Keep Remeron 15 daily         Jeff Spencer PA-C  Heart Transplant  [facility] - Cardiology Stepdown

## 2022-04-25 NOTE — PLAN OF CARE
[facility]   Heart Transplant Clinic  [address] [place] [address]   [phone] after hours        HOME  HEALTH ORDERS      Admit to Home Health    Diagnosis:   Patient Active Problem List   Diagnosis    Acute on chronic combined systolic and diastolic heart failure, NYHA class 4    Anxiety disorder, unspecified    ROC (acute kidney injury)    Dilated cardiomyopathy    Anemia of chronic disease    ICD (implantable cardioverter-defibrillator) in place    Ecstasy abuse    Cannabis use disorder, severe, dependence    Acute exacerbation of CHF (congestive heart failure)    Chronic combined systolic and diastolic congestive heart failure    Tobacco use    Cardiogenic shock       Patient is homebound due to:   Diet: Low Sodium  Acitivities: As Tolerated    Nursing:   SN to complete comprehensive assessment including routine vital signs. Instruct on disease process and s/s of complications to report to MD. Review/verify medication list sent home with the patient at time of discharge  and instruct patient/caregiver as needed. Frequency may be adjusted depending on start of care date.    Notify MD if SBP > 160 or < 90; DBP > 90 or < 50; HR > 120 or < 50; Temp > 101; Weight gain >3lbs in 1 day or 5lbs in 1 week.    LABS:  SN to perform labs: Weekly (on mondays) CBC, CMP, Magnesium.    HOME INFUSION THERAPY:    SN to perform Infusion Therapy/Central Line Care.  Review Central Line Care & Central Line Flush with patient.    Administer (drug and dose): Milrinone 0.375 mcg/kg/min x 93 kg, intravenous, continuous    **For questions or concerns, please call [phone] and ask for Pre-Heart transplant clinic, M-F 8-5. After hours, weekends, call [phone] and ask for the Heart Transplant Cardiologist on call.**    Central line care:  Scrub the Hub: Prior to accessing the line, always perform a 30 second alcohol scrub  Each lumen of the central line is to be  flushed at least daily with 10 mL Normal Saline and 3 mL Heparin flush (100 units/mL)  Skilled Nurse (SN) may draw blood from IV access  Blood Draw Procedure:   - Aspirate at least 5 mL of blood   - Discard   - Obtain specimen   - Change posiflow cap   - Flush with 20 mL Normal Saline followed by a                 3-5 mL Heparin flush (100 units/mL)  Central :   - Sterile dressing changes are done weekly and as needed.   - Use chlor-hexadine scrub to cleanse site, apply Biopatch to insertion site,       apply securement device dressing   - Posi-flow caps are changed weekly and after EVERY lab draw.   - If sterile gauze is under dressing to control oozing,                 dressing change must be performed every 24 hours until gauze is not needed.    CONSULTS:        Send initial Home Health orders to [facility] attending physician on call.  Send follow up questions to [phone] or fax:                Pre Transplant:   [phone]

## 2022-04-26 NOTE — NURSING
Patient received discharge paperwork. Out patient pharmacy is providing him with milrinon drip to go home with. Leaving with picc line. Wife is at the bedside.

## 2022-04-27 ENCOUNTER — TELEPHONE (OUTPATIENT)
Dept: TRANSPLANT | Facility: CLINIC | Age: 37
End: 2022-04-27
Payer: MEDICAID

## 2022-04-27 NOTE — TELEPHONE ENCOUNTER
----- Message from Melanie Tam MA sent at 4/27/2022 10:16 AM CDT -----  The patient is returning the nurse Daniela phone called please call [phone]. Thank you.

## 2022-04-28 LAB
VON WILLEBRAND EVAL PPP-IMP: NORMAL
VWF MULTIMERS PPP QL: NORMAL

## 2022-04-29 ENCOUNTER — OFFICE VISIT (OUTPATIENT)
Dept: TRANSPLANT | Facility: CLINIC | Age: 37
End: 2022-04-29
Payer: MEDICAID

## 2022-04-29 ENCOUNTER — PATIENT MESSAGE (OUTPATIENT)
Dept: PSYCHIATRY | Facility: CLINIC | Age: 37
End: 2022-04-29
Payer: MEDICAID

## 2022-04-29 VITALS
HEIGHT: 75 IN | SYSTOLIC BLOOD PRESSURE: 100 MMHG | WEIGHT: 210 LBS | HEART RATE: 108 BPM | DIASTOLIC BLOOD PRESSURE: 68 MMHG | BODY MASS INDEX: 26.11 KG/M2

## 2022-04-29 DIAGNOSIS — Z95.810 ICD (IMPLANTABLE CARDIOVERTER-DEFIBRILLATOR) IN PLACE: ICD-10-CM

## 2022-04-29 DIAGNOSIS — Z72.0 TOBACCO USE: ICD-10-CM

## 2022-04-29 DIAGNOSIS — I50.42 CHRONIC COMBINED SYSTOLIC AND DIASTOLIC CONGESTIVE HEART FAILURE: ICD-10-CM

## 2022-04-29 DIAGNOSIS — N18.31 STAGE 3A CHRONIC KIDNEY DISEASE: ICD-10-CM

## 2022-04-29 DIAGNOSIS — I42.0 DILATED CARDIOMYOPATHY: Primary | ICD-10-CM

## 2022-04-29 DIAGNOSIS — D63.8 ANEMIA OF CHRONIC DISEASE: ICD-10-CM

## 2022-04-29 PROBLEM — N17.9 AKI (ACUTE KIDNEY INJURY): Status: RESOLVED | Noted: 2020-10-26 | Resolved: 2022-04-29

## 2022-04-29 PROBLEM — N18.30 CKD (CHRONIC KIDNEY DISEASE) STAGE 3, GFR 30-59 ML/MIN: Status: ACTIVE | Noted: 2022-04-29

## 2022-04-29 PROBLEM — I50.43 ACUTE ON CHRONIC COMBINED SYSTOLIC AND DIASTOLIC HEART FAILURE, NYHA CLASS 4: Status: RESOLVED | Noted: 2020-10-25 | Resolved: 2022-04-29

## 2022-04-29 PROCEDURE — 3044F PR MOST RECENT HEMOGLOBIN A1C LEVEL <7.0%: ICD-10-PCS | Mod: CPTII,95,NTX, | Performed by: INTERNAL MEDICINE

## 2022-04-29 PROCEDURE — 3074F PR MOST RECENT SYSTOLIC BLOOD PRESSURE < 130 MM HG: ICD-10-PCS | Mod: CPTII,95,NTX, | Performed by: INTERNAL MEDICINE

## 2022-04-29 PROCEDURE — 4010F PR ACE/ARB THEARPY RXD/TAKEN: ICD-10-PCS | Mod: CPTII,95,NTX, | Performed by: INTERNAL MEDICINE

## 2022-04-29 PROCEDURE — 3074F SYST BP LT 130 MM HG: CPT | Mod: CPTII,95,NTX, | Performed by: INTERNAL MEDICINE

## 2022-04-29 PROCEDURE — 1159F PR MEDICATION LIST DOCUMENTED IN MEDICAL RECORD: ICD-10-PCS | Mod: CPTII,95,NTX, | Performed by: INTERNAL MEDICINE

## 2022-04-29 PROCEDURE — 3008F PR BODY MASS INDEX (BMI) DOCUMENTED: ICD-10-PCS | Mod: CPTII,95,NTX, | Performed by: INTERNAL MEDICINE

## 2022-04-29 PROCEDURE — 1111F DSCHRG MED/CURRENT MED MERGE: CPT | Mod: CPTII,95,NTX, | Performed by: INTERNAL MEDICINE

## 2022-04-29 PROCEDURE — 3008F BODY MASS INDEX DOCD: CPT | Mod: CPTII,95,NTX, | Performed by: INTERNAL MEDICINE

## 2022-04-29 PROCEDURE — 1111F PR DISCHARGE MEDS RECONCILED W/ CURRENT OUTPATIENT MED LIST: ICD-10-PCS | Mod: CPTII,95,NTX, | Performed by: INTERNAL MEDICINE

## 2022-04-29 PROCEDURE — 99214 OFFICE O/P EST MOD 30 MIN: CPT | Mod: 95,NTX,, | Performed by: INTERNAL MEDICINE

## 2022-04-29 PROCEDURE — 4010F ACE/ARB THERAPY RXD/TAKEN: CPT | Mod: CPTII,95,NTX, | Performed by: INTERNAL MEDICINE

## 2022-04-29 PROCEDURE — 1159F MED LIST DOCD IN RCRD: CPT | Mod: CPTII,95,NTX, | Performed by: INTERNAL MEDICINE

## 2022-04-29 PROCEDURE — 3044F HG A1C LEVEL LT 7.0%: CPT | Mod: CPTII,95,NTX, | Performed by: INTERNAL MEDICINE

## 2022-04-29 PROCEDURE — 3078F PR MOST RECENT DIASTOLIC BLOOD PRESSURE < 80 MM HG: ICD-10-PCS | Mod: CPTII,95,NTX, | Performed by: INTERNAL MEDICINE

## 2022-04-29 PROCEDURE — 3078F DIAST BP <80 MM HG: CPT | Mod: CPTII,95,NTX, | Performed by: INTERNAL MEDICINE

## 2022-04-29 PROCEDURE — 99214 PR OFFICE/OUTPT VISIT, EST, LEVL IV, 30-39 MIN: ICD-10-PCS | Mod: 95,NTX,, | Performed by: INTERNAL MEDICINE

## 2022-04-29 NOTE — LETTER
April 29, 2022        Inderjit Hendricks  [address]  [address]  [facility] [address]  Phone: [phone]  Fax: [phone]     Josh Pulido  [address] [address]  Phone: [phone]  Fax: [phone]             [id number]  [address]  [place] [address]  Phone: [phone]   Patient: Kevan Queen   MR Number: [id number]   YOB: 1985   Date of Visit: 4/29/2022       Dear Dr. Inderjit Hendricks, Josh Pulido    Thank you for referring Kevan Queen to me for evaluation. Attached you will find relevant portions of my assessment and plan of care.    If you have questions, please do not hesitate to call me. I look forward to following Kevan Queen along with you.    Sincerely,    Josh Pulido MD    Enclosure    If you would like to receive this communication electronically, please contact [email or web address] or [phone] to request [facility] access.    [employer] is a tool which provides read-only access to select patient information with whom you have a relationship. Its easy to use and provides real time access to review your patients record including encounter summaries, notes, results, and demographic information.    If you feel you have received this communication in error or would no longer like to receive these types of communications, please e-mail [email or web address]

## 2022-04-29 NOTE — PROGRESS NOTES
Advanced Heart Failure and Transplantation Clinic Follow up.         Attending Physician: Josh Pulido MD.  The patient's last visit with me was on 2/4/2022.     Virtual visit only.    Patient ID:  Kevan Queen is a 36 y.o. male who presents for follow-up of Congestive Heart Failure     HPI 36 yo BM with NICM, on home milrinone, hx of substance abuse who attends this virtual f/u visit.     He claims being clean of any drugs, alcoho or tobacco for a long time. He has a fiance that he has been living with for the last 8 years. They have 2 children in common. He has a total of 9 kids.       Review of Systems   Constitutional: Negative for activity change, appetite change, chills, diaphoresis and fever.   HENT: Negative for nasal congestion, rhinorrhea and sore throat.    Eyes: Negative for visual disturbance.   Respiratory: Negative for cough and shortness of breath.    Cardiovascular: Negative for chest pain.   Gastrointestinal: Negative for abdominal pain, diarrhea, nausea and vomiting.   Genitourinary: Negative for difficulty urinating, dysuria and hematuria.   Integumentary:  Negative for rash.   Neurological: Negative for seizures, syncope and light-headedness.   Psychiatric/Behavioral: Negative for agitation and hallucinations.        Past Medical History:   Diagnosis Date    Arthritis     Cardiomyopathy     CHF (congestive heart failure) 10/01/2020    Dilated cardiomyopathy 10/26/2020    Drug abuse 10/2020    Renal disorder         Past Surgical History:   Procedure Laterality Date    CARDIAC DEFIBRILLATOR PLACEMENT      RIGHT HEART CATHETERIZATION Right 4/8/2022    Procedure: INSERTION, CATHETER, RIGHT HEART;  Surgeon: Luca Lopez Jr., MD;  Location: [facility] CATH LAB;  Service: Cardiology;  Laterality: Right;    RIGHT HEART CATHETERIZATION Right 4/19/2022    Procedure: INSERTION, CATHETER, RIGHT HEART;   Surgeon: Josh Pulido MD;  Location: [facility] CATH LAB;  Service: Cardiology;  Laterality: Right;        Family History   Problem Relation Age of Onset    Heart failure Father     Diverticulosis Brother     Heart attack Maternal Grandmother     Heart attack Maternal Grandfather         Review of patient's allergies indicates:   Allergen Reactions    Bumex [bumetanide] Hives    Lactose Diarrhea     Other reaction(s): Abdominal distension, gaseous    Torsemide Hives        Current Outpatient Medications   Medication Instructions    albuterol (PROVENTIL/VENTOLIN HFA) 90 mcg/actuation inhaler INHALE 2 PUFFS BY MOUTH EVERY 4 HOURS AS NEEDED FOR COUGH OR WHEEZING    aspirin 81 mg, Oral, Daily    busPIRone (BUSPAR) 7.5 mg, Oral, Every 12 hours    digoxin (LANOXIN) 0.125 mg, Oral, Daily    EScitalopram oxalate (LEXAPRO) 5 mg, Oral, Daily    ferrous sulfate 325 mg, Oral, Daily    furosemide (LASIX) 80 mg, Oral, 2 times daily    milrinone 20mg/100ml D5W, 200mcg/ml, (PRIMACOR) 20 mg/100 mL (200 mcg/mL) infusion 0.375 mcg/kg/min, Intravenous, Continuous    mirtazapine (REMERON) 15 mg, Oral, Nightly    pantoprazole (PROTONIX) 40 mg, Oral, Daily    prochlorperazine (COMPAZINE) 5 mg, Oral, Every 6 hours PRN    promethazine (PHENERGAN) 12.5 mg, Oral, Every 6 hours PRN    sacubitriL-valsartan (ENTRESTO) 49-51 mg per tablet 1 tablet, Oral, 2 times daily    spironolactone (ALDACTONE) 25 mg, Oral, Daily    sucralfate (CARAFATE) 1 g, Oral, Nightly    traMADoL (ULTRAM) 50 mg, Oral, Every 6 hours PRN        Vitals:    04/29/22 1535   BP: 100/68   Pulse: 108        Wt Readings from Last 3 Encounters:   04/29/22 95.3 kg (210 lb)   04/24/22 94.3 kg (207 lb 14.3 oz)   10/15/21 78.8 kg (173 lb 11.6 oz)     Temp Readings from Last 3 Encounters:   04/25/22 98 °F (36.7 °C)   04/08/22 97.9 °F (36.6 °C) (Temporal)   10/30/20 97.4 °F (36.3 °C) (Oral)     BP Readings from Last 3 Encounters:   04/29/22 100/68   04/25/22  "97/63   10/15/21 106/75     Pulse Readings from Last 3 Encounters:   04/29/22 108   04/25/22 95   04/08/22 106        Body mass index is 26.25 kg/m². Estimated body surface area is 2.25 meters squared as calculated from the following:    Height as of this encounter: 6' 3" (1.905 m).    Weight as of this encounter: 95.3 kg (210 lb).     Physical Exam     Lab Results   Component Value Date     (H) 04/18/2022     04/25/2022    K 4.3 04/25/2022    MG 2.2 04/25/2022    CL 98 04/25/2022    CO2 27 04/25/2022    BUN 29 (H) 04/25/2022    CREATININE 1.7 (H) 04/25/2022     (H) 04/25/2022    HGBA1C 6.2 (H) 04/18/2022    AST 43 (H) 04/25/2022    ALT 52 (H) 04/25/2022    ALBUMIN 3.9 04/25/2022    PROT 7.6 04/25/2022    BILITOT 0.4 04/25/2022    WBC 7.58 04/25/2022    HGB 13.3 (L) 04/25/2022    HCT 40.2 04/25/2022     04/25/2022    INR 1.1 04/19/2022     04/18/2022    TSH 2.080 04/19/2022    CHOL 261 (H) 04/18/2022    HDL 54 04/18/2022    LDLCALC Invalid, Trig>400.0 04/18/2022    TRIG 496 (H) 04/18/2022    FREET4 0.96 04/13/2022         Results for orders placed during the hospital encounter of 04/12/22    Echo    Interpretation Summary  · The left ventricle is severely enlarged with severely decreased systolic function. The estimated ejection fraction is 15%.  · Severe right ventricular enlargement with moderately reduced right ventricular systolic function.  · Left ventricular diastolic dysfunction.  · Mild left atrial enlargement.  · Moderate mitral regurgitation.  · Mild tricuspid regurgitation.  · The estimated PA systolic pressure is 34 mmHg.  · Normal central venous pressure (3 mmHg).        Results for orders placed during the hospital encounter of 04/12/22    Cardiac catheterization    Conclusion  · The estimated blood loss was none.  · The filling pressures on the left were severely elevated. Pulmonary hypertension was moderate.  · RA 11 PA 50/27 (35) PCWP 27 CO 3.7 l/min CI 1.7 " l/min/m2.  · PVR 2.2 CHRISTENSEN  · SVR 1535 dynes/cm5.s  · Hemodynamic values obtained while on milrinone 0.5 mcg/kg/min  · Post capillary PH.    The procedure log was documented by Documenter: Wilmer Khan and verified by Josh Pulido MD.    Date: 4/19/2022  Time: 9:03 AM         Assessment and Plan:  Kevan was seen today for heart transplant pre-evaluation.    Diagnoses and all orders for this visit:    Dilated cardiomyopathy    ICD (implantable cardioverter-defibrillator) in place: no shocks    Chronic combined systolic and diastolic congestive heart failure    Anemia of chronic disease    Tobacco use: denies recent use    Stage 3a chronic kidney disease: stable in labs obtained 4 days ago.    1. Chronic systolic HF, NYHA class III, stage D. On home milrinone.  Etiology: NICM, drug abuse related.  Patient approved for LVAD.   No changes on medications today.

## 2022-04-30 NOTE — ED PROVIDER NOTES
Patient:   Kevan Queen             MRN: [id number]            FIN: [id number]               Age:   35 years     Sex:  Male     :  1985   Associated Diagnoses:   Shortness of breath at rest; Non-ST elevation MI (NSTEMI); Acute chest pain; Acute on chronic systolic CHF (congestive heart failure), NYHA class 4   Author:   Danitza ROSEN, Yany PAUL      Basic Information   Time seen: Date & time 2020 15:46:00.   History source: Patient.   Arrival mode: Private vehicle.      History of Present Illness   The patient presents with 36 y/o male who presents with cp and sob that started today about 1 hour ago. has life vest on for chf, cardiomyopathy. sees cardiology at [facility].  moriah nunez.  The onset was 1  hours ago.  The course/duration of symptoms is constant.  Degree at onset moderate.  Degree at present moderate.  The Exacerbating factors is none.  The Relieving factors is none.  Risk factors consist of congestive heart failure.  Prior episodes: multiple ED visits.  Therapy today: none.  Associated symptoms: chest pain.     I, Dr. Bosch, assumed care of patient at 1710  35-year-old male with a history of nonischemic cardiomyopathy with LifeVest in place presents ED complaining of orthopnea, dyspnea at rest and midline chest pain.  Shortness of breath and orthopnea began 1 to 2 days ago when chest pain began 1 hour prior to arrival.  Currently being evaluated for heart transplant in [place].  Does not take aspirin daily and has had normal angiogram 2 months ago.  Also complaining of some epigastric abdominal pain that has been ongoing for months without much change as well as some constipation due to iron supplementation.  No coughing fever chills           Review of Systems   Constitutional symptoms:  No fever, no chills, no sweats.    Skin symptoms:  No rash, no petechiae.    Eye symptoms:  Vision unchanged, no pain, no discharge, no icterus, no diplopia, no blurred vision, no blindness.     ENMT symptoms:  No ear pain, no sore throat, no nasal congestion, no sinus pain.    Respiratory symptoms:  Shortness of breath, no orthopnea, no cough, no hemoptysis, no stridor, no wheezing.    Cardiovascular symptoms:  Chest pain, no palpitations, no syncope, no diaphoresis, no peripheral edema.    Gastrointestinal symptoms:  Abdominal pain, no nausea, no vomiting, no diarrhea, no constipation, no rectal bleeding, no rectal pain.    Genitourinary symptoms:  No dysuria, no hematuria.    Musculoskeletal symptoms:  No back pain, no Muscle pain.    Neurologic symptoms:  No headache, no dizziness, no altered level of consciousness, no numbness, no tingling.    Psychiatric symptoms:  Negative except as documented in HPI.   Endocrine symptoms:  Negative except as documented in HPI.   Hematologic/Lymphatic symptoms:  Negative except as documented in HPI.             Additional review of systems information: All other systems reviewed and otherwise negative.      Health Status   Allergies:    Allergic Reactions (Selected)  Severity Not Documented  Lactose- Abdominal distension, gaseous.,    Allergies (1) Active Reaction  Lactose Abdominal distension, gaseous  .   Medications:  (Selected)   Prescriptions  Prescribed  Coreg 3.125 mg oral tablet: 6.25 mg = 2 tab(s), Oral, BID, # 120 tab(s), 0 Refill(s)  Lasix 20 mg oral tablet: 20 mg = 1 tab(s), Oral, Daily, # 30 tab(s), 2 Refill(s)  aspirin 325 mg oral Delayed Release (EC) tablet: 325 mg = 1 tab(s), Oral, Daily, may get otc, # 90 tab(s), 0 Refill(s).      Past Medical/ Family/ Social History   Medical history:    No active or resolved past medical history items have been selected or recorded..   Surgical history:    No active procedure history items have been selected or recorded..   Family history:    No family history items have been selected or recorded..   Problem list:    Active Problems (2)  Tobacco user   Tobacco user   .      Physical Examination                Vital Signs      No qualifying data available.   General:  Alert, no acute distress.    Skin:  Warm, dry, intact, normal for ethnicity.    Head:  Normocephalic, atraumatic.    Neck:  Trachea midline, no JVD.    Eye:  Pupils are equal, round and reactive to light, extraocular movements are intact, normal conjunctiva, vision unchanged.    Ears, nose, mouth and throat:  Oral mucosa moist, no pharyngeal erythema or exudate.    Cardiovascular:  Regular rate and rhythm, No murmur, Normal peripheral perfusion, No edema.    Respiratory:  Lungs are clear to auscultation, respirations are non-labored, breath sounds are equal, Symmetrical chest wall expansion.    Chest wall:  No tenderness, No deformity.    Back:  Normal range of motion.   Musculoskeletal:  Normal ROM, normal strength.    Gastrointestinal:  Soft, Nontender, Non distended, Normal bowel sounds.    Neurological:  Alert and oriented to person, place, time, and situation, No focal neurological deficit observed, CN II-XII intact.    Psychiatric:  Cooperative, appropriate mood & affect.       Medical Decision Making   Differential Diagnosis:  Pneumonia, bronchitis, congestive heart failure, pulmonary edema, pleural effusion, acute myocardial infarction.    Rationale:  pt with acute on chronic chf with type 2 nstemi, clean coronaries within 2 months, asa, nitro, lasix, admit for diuresis and trending enzymes.   Documents reviewed:  Emergency department nurses' notes, prior records.    Orders  Launch Orders   Laboratory:  POC BNP iSTAT request (Order): Blood, Stat collect, 11/16/2020 15:47 CST by Yumiko Lovelace, Lab Collect, Print Label By Order Location  Troponin-I (Order): Stat collect, 11/16/2020 15:46 CST, Blood, Lab Collect, Print Label By Order Location, 11/16/2020 15:46 CST  CMP (Order): Stat collect, 11/16/2020 15:46 CST, Blood, Lab Collect, Print Label By Order Location, 11/16/2020 15:46 CST  CBC w/ Auto Diff (Order): Now collect, 11/16/2020 15:46 CST,  Blood, Lab Collect, Print Label By Order Location, 11/16/2020 15:46 CST  Radiology:  XR Chest 2 Views (Order): Stat, 11/16/2020 15:47 CST, Cough, None, Stretcher, Rad Type, Not Scheduled  Cardiology:  EKG (Order): 11/16/2020 15:47 CST, NOW, -1, -1, 11/16/2020 15:47 CST.   Electrocardiogram:  Time 11/16/2020 19:23:00, rate 107, sinus tachycardia with pvcs, lad, lvh, nonspecific st and t wave abnormality.    Results review:  Lab results : Lab View   11/16/2020 17:53 CST     Lipase Lvl                13 U/L    11/16/2020 17:07 CST     Est Creat Clearance Ser   71.26 mL/min    11/16/2020 16:30 CST     POC BNP iSTAT             2,553 pg/mL  HI    11/16/2020 16:03 CST     Sodium Lvl                140 mmol/L                             Potassium Lvl             3.7 mmol/L                             Chloride                  103 mmol/L                             CO2                       27 mmol/L                             Calcium Lvl               8.8 mg/dL                             Glucose Lvl               158 mg/dL  HI                             BUN                       18.4 mg/dL                             Creatinine                1.72 mg/dL  HI                             eGFR-AA                   58  NA                             eGFR-MARY                  48 mL/min/1.73 m2  NA                             Bili Total                1.2 mg/dL                             Bili Direct               0.5 mg/dL                             Bili Indirect             0.70 mg/dL                             AST                       33 unit/L                             ALT                       28 unit/L                             Alk Phos                  53 unit/L                             Total Protein             6.9 gm/dL                             Albumin Lvl               3.9 gm/dL                             Globulin                  3.0 gm/dL                             A/G Ratio                 1.3 ratio                              Troponin-I                0.129 ng/mL  HI                             WBC                       9.4 x10(3)/mcL                             RBC                       4.22 x10(6)/mcL  LOW                             Hgb                       12.1 gm/dL  LOW                             Hct                       37.4 %  LOW                             Platelet                  207 x10(3)/mcL                             MCV                       88.6 fL                             MCH                       28.7 pg                             MCHC                      32.4 gm/dL  LOW                             RDW                       19.1 %  HI                             MPV                       10.1 fL                             Abs Neut                  5.51 x10(3)/mcL                             Neutro Auto               59 %  NA                             Lymph Auto                34 %  NA                             Mono Auto                 6 %  NA                             Eos Auto                  0 %  NA                             Abs Eos                   0.0 x10(3)/mcL                             Basophil Auto             0 %  NA                             Abs Neutro                5.51 x10(3)/mcL                             Abs Lymph                 3.2 x10(3)/mcL                             Abs Mono                  0.6 x10(3)/mcL                             Abs Baso                  0.0 x10(3)/mcL    ,    No qualifying data available.    Radiology results:  Rad Results (ST)  < 12 hrs   Accession: [id number]  Order: XR Chest 2 Views  Report Dt/Tm: 11/16/2020 16:00  Report:   Clinical History  Cough     Technique  2 views of the chest.     Comparison  10/25/2020     Findings  Cardiomegaly is unchanged with no focal opacification. Electronic  device overlies the lower thorax. No acute osseous abnormality.     Impression  No acute cardiopulmonary process. Cardiomegaly  remains.        .      Reexamination/ Reevaluation   Notes: agreeable with plan.      Impression and Plan   Diagnosis   Shortness of breath at rest (QDA33-AV R06.02)   Non-ST elevation MI (NSTEMI) (JPQ59-HS I21.4)   Acute chest pain (OSI38-XD R07.9)   Acute on chronic systolic CHF (congestive heart failure), NYHA class 4 (QVF45-ND I50.23)      Calls-Consults   -  11/16/2020 18:06:00 , cis, recommends notified of pt being in ed with post nstemi, admit, durese, trend enzymes.    Plan   Condition: Improved.    Disposition: Admit time  11/16/2020 18:06:00, Place in Observation Telemetry Unit.    Counseled: Patient, Family, Regarding diagnosis, Regarding diagnostic results, Regarding treatment plan, Patient indicated understanding of instructions.

## 2022-04-30 NOTE — DISCHARGE SUMMARY
Patient:   Kevan Queen             MRN: [id number]            FIN: [id number]               Age:   35 years     Sex:  Male     :  1985   Associated Diagnoses:   None   Author:   Tyree Almonte      Please see H/P From 20 as DC Summary.    Thanks.

## 2022-04-30 NOTE — H&P
Patient:   Kevan Queen             MRN: [id number]            FIN: [id number]               Age:   36 years     Sex:  Male     :  1985   Associated Diagnoses:   None   Author:   Tha Treviño DO      Chief Complaint:     HPI: 36-year-old male with a history of CHF, CKD stage III, polysubstance abuse presented to ED with shortness of breath, urinary retention since this morning. He stated that he takes torsemide but wasn't able to urinate with it.  Patient stated having SOB more when he is lying flat. Patient was at the ED last week with heart palpitations and was discharged from the ED. He stated that he ate 1/2 of a full watermelon yesterday night. He had some episodes of gagging today as well. He also complained of having blood in his stool which is uncommon for him. Patient denied any chest pain, heart palpitations, vomiting, diarrhea or constipation at this time. Patient stated that he has appointment with cardiologist on sep 3rd and a nephrologist appointment for the first time on sep 22nd. At the ED , patient received lasix 40 mg once and he was able to urinate. Patietn is sating at 100% on RA. Patient was admitted due to ROC with BUN over creatinine of 30.9/3.04.  Patient also has a GFR of 30 and a BNP of 3522.8, CK of 234 and a troponin of 0.067.     PMHx: CHF, CKD 3, polysubstance abuse     PSx:  HEART TRANSPLANT LABS (EXTERNAL): 20  Basic metabolic panel (Calcium, total) This panel must include the following: Calcium, total (32149) Carbon dioxide (bicarbonate) (51253) Chloride (98669) Creatinine (92186) Glucose (15136) Potassium (05875) Sodium (33020) Urea nitrogen (BUN) (61184): 20  Natriuretic peptide: 20  Basic metabolic panel (Calcium, total) This panel must include the following: Calcium, total (66483) Carbon dioxide (bicarbonate) (55587) Chloride (92062) Creatinine (79707) Glucose (64521) Potassium (51964) Sodium (21425) Urea nitrogen (BUN) (67078):  11/05/20  Magnesium: 11/05/20  Natriuretic peptide: 11/05/20  Magnesium: 10/30/20  Comprehensive metabolic panel This panel must include the following: Albumin (47264) Bilirubin, total (25831) Calcium, total (36996) Carbon dioxide (bicarbonate) (47494) Chloride (19538) Creatinine (11952) Glucose (82768) Phosphatase, alkaline (97142) Pot: 10/30/20  Phosphorus inorganic (phosphate);: 10/30/20  COMPLETE BLOOD COUNT W AUTO DIFFERENTIAL PANEL:-:PT:BLD:QN:: 10/30/20  Comprehensive metabolic panel This panel must include the following: Albumin (85507) Bilirubin, total (01728) Calcium, total (41524) Carbon dioxide (bicarbonate) (39052) Chloride (83045) Creatinine (46091) Glucose (70628) Phosphatase, alkaline (63580) Pot: 10/29/20  Phosphorus inorganic (phosphate);: 10/29/20  Magnesium: 10/29/20  COMPLETE BLOOD COUNT W AUTO DIFFERENTIAL PANEL:-:PT:BLD:QN:: 10/29/20  COMPLETE BLOOD COUNT W AUTO DIFFERENTIAL PANEL:-:PT:BLD:QN:: 10/28/20  Phosphorus inorganic (phosphate);: 10/28/20  Comprehensive metabolic panel This panel must include the following: Albumin (72800) Bilirubin, total (39443) Calcium, total (31178) Carbon dioxide (bicarbonate) (92133) Chloride (43547) Creatinine (50868) Glucose (52384) Phosphatase, alkaline (86181) Pot: 10/28/20  Magnesium: 10/28/20  Ferritin: 10/27/20  Phosphorus inorganic (phosphate);: 10/27/20  Thiamine (Vitamin B-1): 10/27/20  Thyroxine; free: 10/27/20  Antibody; HIV-1 and HIV-2, single result: 10/27/20  Comprehensive metabolic panel This panel must include the following: Albumin (36143) Bilirubin, total (20566) Calcium, total (46987) Carbon dioxide (bicarbonate) (02389) Chloride (10877) Creatinine (27997) Glucose (91396) Phosphatase, alkaline (61663) Pot: 10/27/20  Thyroid stimulating hormone (TSH): 10/27/20  Acute hepatitis panel This panel must include the following: Hepatitis A antibody (HAAb), IgM antibody (07353) Hepatitis B core antibody (HBcAb), IgM antibody (56185) Hepatitis B  surface antigen (HBsAg) (91540) Hepatitis C antibody (73090): 10/27/20  COMPLETE BLOOD COUNT W AUTO DIFFERENTIAL PANEL:-:PT:BLD:QN:: 10/27/20  Magnesium: 10/27/20  Iron: 10/27/20  COMPLETE BLOOD COUNT W AUTO DIFFERENTIAL PANEL:-:PT:BLD:QN:: 10/26/20  Troponin, quantitative: 10/26/20  Pyridoxal phosphate (Vitamin B-6): 10/26/20  Lipase: 10/26/20  HC TTE W OR WO FOL WCON,DOPPLERTRANSTHORACIC ECHO (TTE) COMPLETE W/ CONTRAST: 10/26/20  Natriuretic peptide: 10/26/20  Ketone body(s) (eg, acetone, acetoacetic acid, beta-hydroxybutyrate); quantitative: 10/26/20  Folic acid; serum: 10/26/20  Radiologic examination, chest; single view: 10/26/20  Magnesium: 10/26/20  Lactate (lactic acid): 10/26/20  SARS CORONAVIRUS 2 RDRP GENE:PRTHR:PT:RESPIRATORY:ORD:PROBE.AMP.TAR: 10/26/20  Comprehensive metabolic panel This panel must include the following: Albumin (48167) Bilirubin, total (45403) Calcium, total (87186) Carbon dioxide (bicarbonate) (12508) Chloride (83651) Creatinine (58463) Glucose (03399) Phosphatase, alkaline (57382) Pot: 10/26/20  Cyanocobalamin (Vitamin B-12);: 10/26/20  Phosphorus inorganic (phosphate);: 10/26/20  Electrocardiogram, routine ECG with at least 12 leads; tracing only, without interpretation and report: 10/25/20  Radiologic examination, chest; single view: 10/15/20    FMHx:  Father: Diabetes mellitus type 2; Heart failure.; Hypertension.  Mother: Diabetes mellitus type 2; Heart murmur.; Hypertension.  Brother: Crohn's disease.; Heart murmur.; Hypertension.    Allergies:  No Known Medication Allergies; Lactose  , Torsemide   Medications:  Home Medications (7) Active  albuterol 90 mcg/inh inhalation aerosol 2 puff(s), PRN, INH, q4hr  ferrous sulfate 325 mg (65 mg elemental iron) oral delayed release tablet 325 mg = 1 tab(s), Oral, Daily  metoprolol succinate 25 mg oral capsule, extended release 25 mg = 1 cap(s), Oral, Daily  Potassium Chloride (Eqv-K-Tab) 20 mEq oral tablet, extended release 20 mEq = 1  tab(s), Oral, Daily  sacubitril-valsartan 49 mg-51 mg oral tablet 1 tab(s), Oral, BID  torsemide 20 mg oral tablet 30 mg = 1.5 tab(s), Oral, Daily  Vitamin D3 50,000 intl units (1250 mcg) oral capsule 50,000 IntUnit = 1 cap(s), Oral, qWeek    SHx  -Tobacco smokes marijuana since 12 years old  -ETOH occasionally  -Illicit drug use - ecstasy, last one on September 2020     ROS:  CONSTITUTIONAL:  NO weight loss/gain, anorexia,fever, night sweats,pain in jaw, lack of energy  CV: NO Palppitations, chest pain, pedal edema, claudication  RESP: +SOB, RAMIREZ, Cough, Wheezing, sputum production  GI: NO abdominal pain,+ Gagging  nausea, vomiting, diarrhea, GERD, dysphagia, melena, change in bowel habbits  : No dysuria, frequency, urgency, discharge, pruritus, polyuria, anuria, oliguria  MKS: NO Joint pain, back pain, swelling of joints, joint deformities.  HEM: bleeding in the stool        Physical Exam:  Vital Signs (last 24 hrs)_____  Last Charted___________  Temp Oral     36.8 DegC  (AUG 30 21:10)  Heart Rate Peripheral   72 bpm  (AUG 31 02:10)  Resp Rate         20 br/min  (AUG 31 02:10)  SBP      112 mmHg  (AUG 31 02:10)  DBP      H 92mmHg  (AUG 31 02:10)  SpO2      100 %  (AUG 31 02:10)  Weight      80.9 kg  (AUG 30 21:10)  Height      192 cm  (AUG 30 21:10)  BMI      21.95  (AUG 30 21:10)     GENERAL: NAD  SKIN: Warm, Dry, good turgor  NECK: Supple, no neck vein distention  LUNGS: CTABL, No R/R/W  CV: RRR. Normal S1,S2. No M/R/G. No S3,S4  ABDOMEN: Soft, NDNT,  BS present. + Epigastric tenderness on palpation   VASCULAR: Radial Pulses palpable +2 B/L UE. DP/PT pulses palpable +2 B/L LE  EXTREMITIES: No evidence of Wasting, no pedal edema, no cyanosis or clubbing       Labs:  Labs Last 24 Hours   Chemistry  Hematology/Coagulation    Sodium Lvl: 139 mmol/L (08/30/21 21:30:38) WBC: 8.5 x10(3)/mcL (08/30/21 21:30:38)   Potassium Lvl: 3.9 mmol/L (08/30/21 21:30:38) RBC: 4.57 x10(6)/mcL (08/30/21 21:30:38)   Chloride: 101  mmol/L (08/30/21 21:30:38) Hgb: 13.6 gm/dL (08/30/21 21:30:38)   CO2: 28 mmol/L (08/30/21 21:30:38) Hct: 39.2 % Low (08/30/21 21:30:38)   Calcium Lvl: 9.8 mg/dL (08/30/21 21:30:38) Platelet: 180 x10(3)/mcL (08/30/21 21:30:38)   Glucose Lvl: 93 mg/dL (08/30/21 21:30:38) MCV: 85.8 fL (08/30/21 21:30:38)   BUN: 30.9 mg/dL High (08/30/21 21:30:38) MCH: 29.8 pg (08/30/21 21:30:38)   Creatinine: 3.04 mg/dL High (08/30/21 21:30:38) MCHC: 34.7 gm/dL (08/30/21 21:30:38)   Est Creat Clearance Ser: 40.8 mL/min (08/30/21 21:55:55) RDW: 15.2 % High (08/30/21 21:30:38)   eGFR-AA: 30 Low (08/30/21 21:30:38) MPV: 10.5 fL High (08/30/21 21:30:38)   eGFR-MARY: 25 mL/min/1.73 m2 Low (08/30/21 21:30:38) Abs Neut: 5.24 x10(3)/mcL (08/30/21 21:30:38)   Bili Total: 0.9 mg/dL (08/30/21 21:30:38) Neutro Auto: 62 % (08/30/21 21:30:38)   Bili Direct: 0.4 mg/dL (08/30/21 21:30:38) Lymph Auto: 28 % (08/30/21 21:30:38)   Bili Indirect: 0.5 mg/dL (08/30/21 21:30:38) Mono Auto: 8 % (08/30/21 21:30:38)   AST: 36 unit/L High (08/30/21 21:30:38) Eos Auto: 1 % (08/30/21 21:30:38)   ALT: 27 unit/L (08/30/21 21:30:38) Abs Eos: 0.1 x10(3)/mcL (08/30/21 21:30:38)   Alk Phos: 86 unit/L (08/30/21 21:30:38) Basophil Auto: 0 % (08/30/21 21:30:38)   Total Protein: 7.6 gm/dL (08/30/21 21:30:38) Abs Neutro: 5.24 x10(3)/mcL (08/30/21 21:30:38)   Albumin Lvl: 3.9 gm/dL (08/30/21 21:30:38) Abs Lymph: 2.4 x10(3)/mcL (08/30/21 21:30:38)   Globulin: 3.7 gm/dL High (08/30/21 21:30:38) Abs Mono: 0.7 x10(3)/mcL (08/30/21 21:30:38)   A/G Ratio: 1.1 ratio (08/30/21 21:30:38) Abs Baso: 0 x10(3)/mcL (08/30/21 21:30:38)   BNP: 3522.8 pg/mL High (08/30/21 21:30:38) NRBC%: 0.0 (08/30/21 21:30:38)   Total CK: 234 U/L High (08/30/21 21:30:38) IG%: 0 % (08/30/21 21:30:38)   CK MB: 1.2 ng/mL (08/30/21 21:30:38) IG#: 0.02 (08/30/21 21:30:38)   Troponin-I: 0.067 ng/mL High (08/30/21 21:30:38)        Imaging:      Assessment/Plan:     CHF exacerbation     -Diagnosed in September  2020  - BNP is elevated with BNP of 3522.8  - Troponin is elevated, 0.067  -EKG, echo is pending  - Loading dose of aspirin and Plavix are ordered  - Holding off heparin  -Last echo in 2020 showed ejection fraction of 15 to 20%  -Lasix 40 mg BID DAILY   , patient was able to urinate with meds  -Patient has an appointment with the cardiologist on September 3  -Strict I and O's  - Continue monitoring labs   - NPO after midnight if consider to do lexiscan if EKG is abnormal or troponin trends  - UDS pending       CKD stage 3   - GFR of 30  - Urinary retention initially   - BUN/cr : 30.9/3.04, with CR clearance >40  - Patient stated that torsemide isn't working for him   - Lasix 40mg was given   - Nephrologist appointment on sep  22nd, Patient has never seen a nephrologist before     Epigastric pain/ GI bleed   - Severe abd pain on palpation;   - Patient stated having blood in his stool   - US of the pancreas is ordered   - lipase is ordered   - FOBT test is ordered   - NEEMA exam showed no signs of bleeding or any hemorrhoids   - Guaiac test was borderline positive   - ferritin, Iron profile is ordered  - stool culture is ordered   - mag and phosph pending   - GI prophy : Protonix 40  - DVT prophy : Levenox 40 . CrCl >40       Disposition: _ Monitor labs, EKG, ECHO, US Abd pending, Give lasix BID

## 2022-04-30 NOTE — H&P
Patient:   Kevan Queen             MRN: [id number]            FIN: [id number]               Age:   35 years     Sex:  Male     :  1985   Associated Diagnoses:   None   Author:   Emmanuel ROSEN, Yoni      Basic Information   Source of history:  Self.    Present at bedside:  Medical personnel.    Referral source:  Emergency department.    History limitation:  None.       Chief Complaint   10/25/2020 9:23 CDT      c/o sob, c/o upper ab pain x 2 days, denies CP, hx HF, defibrillator      History of Present Illness   patient left [facility]a before seen      Review of Systems   Constitutional:  Weakness.    Eye:  Negative.    Ear/Nose/Mouth/Throat:  Negative.    Respiratory:  Shortness of breath.    Cardiovascular:  Palpitations.    Gastrointestinal:  Negative.    Genitourinary:  Negative.    Hematology/Lymphatics:  Negative.    Endocrine:  Negative.    Immunologic:  Negative.    Musculoskeletal:  Neck pain, Decreased range of motion.    Integumentary:  Negative.    Neurologic:  Alert and oriented X4, Abnormal balance, Numbness, Tingling, Headache.    Psychiatric:  Negative.    All other systems are negative      Health Status   Allergies:    Allergic Reactions (Selected)  Severity Not Documented  Lactose- Abdominal distension, gaseous.,    Allergies (1) Active Reaction  Lactose Abdominal distension, gaseous     Current medications:  (Selected)   Inpatient Medications  Ordered  Zofran 2 mg/mL injectable solution: 4 mg, form: Injection, IV Push, q4hr PRN for nausea, first dose 10/25/20 10:10:00 CDT, STAT  Zofran 2 mg/mL injectable solution: 4 mg, form: Injection, IV Push, q4hr PRN for nausea, first dose 10/25/20 11:51:00 CDT, STAT  aspirin: 324 mg, form: Tab-Chew, Oral, Daily, first dose 10/25/20 11:51:00 CDT, STAT  morphine: 2 mg, form: Injection, IV Push, q5min PRN for chest pain, first dose 10/25/20 11:51:00 CDT, STAT, unrelieved by NTG for pain scale 1-5 or face scale 2-4. Notify physician if no relief after 5  doses.  Prescriptions  Prescribed  Coreg 3.125 mg oral tablet: 6.25 mg = 2 tab(s), Oral, BID, # 120 tab(s), 0 Refill(s)  Lasix 20 mg oral tablet: 20 mg = 1 tab(s), Oral, Daily, # 30 tab(s), 2 Refill(s)  Xanax 0.25 mg oral tablet: 0.25 mg = 1 tab(s), Oral, BID, PRN PRN anxiety, X 7 day(s), # 14 tab(s), 0 Refill(s)  aspirin 325 mg oral Delayed Release (EC) tablet: 325 mg = 1 tab(s), Oral, Daily, may get otc, # 90 tab(s), 0 Refill(s),    Medications (4) Active  Scheduled: (1)  aspirin 81 mg Chew tab  324 mg 4 tab(s), Oral, Daily  Continuous: (0)  PRN: (3)  morphine 4 mg/mL preservative-free Soln  2 mg 0.5 mL, IV Push, q5min  ondansetron 2 mg/mL inj - 2mL  4 mg 2 mL, IV Push, q4hr  ondansetron 2 mg/mL inj - 2mL  4 mg 2 mL, IV Push, q4hr     Problem list:    All Problems  Tobacco user / SNOMED CT 259000054 / Probable  Tobacco user / SNOMED CT 696331470 / Confirmed,    Active Problems (2)  Tobacco user   Tobacco user         Histories   Past Medical History:    No active or resolved past medical history items have been selected or recorded.   Family History:    No family history items have been selected or recorded.   Procedure history:    No active procedure history items have been selected or recorded.   Social History        Social & Psychosocial Habits    Alcohol  08/10/2017  Use: Never    Substance Use  08/10/2017  Use: Never    01/09/2020  Use: Current    Type: Marijuana    Tobacco  08/10/2017  Use: Current every day smoker    Type: Cigarettes    Tobacco use per day: 20    01/09/2020  Use: 10 or more cigarettes (1/    Patient Wants Consult For Cessation Counseling No    10/15/2020  Use: 10 or more cigarettes (1/    Patient Wants Consult For Cessation Counseling No    10/14/2020  Use: 10 or more cigarettes (1/    Patient Wants Consult For Cessation Counseling No    10/25/2020  Use: Smoker, current status un    Patient Wants Consult For Cessation Counseling N/A    Abuse/Neglect  10/15/2020  SHX Any signs of abuse or  neglect No    10/14/2020  SHX Any signs of abuse or neglect No    10/25/2020  SHX Any signs of abuse or neglect No  .        Physical Examination   General:  Alert and oriented.    Eye:  Pupils are equal, round and reactive to light, Extraocular movements are intact, Normal conjunctiva, Vision unchanged.    HENT:  Normocephalic, Normal hearing, Oral mucosa is moist, No pharyngeal erythema.    Neck:  Supple, Non-tender, No carotid bruit.    Respiratory:  Lungs are clear to auscultation, Respirations are non-labored, Breath sounds are equal, No chest wall tenderness.    Cardiovascular:  Normal rate, Regular rhythm, No murmur, No gallop.    Gastrointestinal:  Soft, Non-tender, Non-distended, Normal bowel sounds, No organomegaly.    Genitourinary:  No costovertebral angle tenderness, No inguinal tenderness, No urethral discharge.       Vital Signs (last 24 hrs)_____  Last Charted___________  Temp Oral     36.3 DegC  (OCT 25 15:27)  Heart Rate Peripheral   87 bpm  (OCT 25 15:27)  Resp Rate         20 br/min  (OCT 25 15:27)  SBP      107 mmHg  (OCT 25 15:27)  DBP      H 93mmHg  (OCT 25 15:27)  SpO2      100 %  (OCT 25 15:27)  Weight      72.6 kg  (OCT 25 15:11)  Height      190 cm  (OCT 25 15:11)  BMI      20.11  (OCT 25 15:11)     Lymphatics:  No lymphadenopathy neck, axilla, groin.    Musculoskeletal:  Normal range of motion, Normal strength.    Neurologic:  Alert, Oriented, Normal sensory, Normal motor function, No focal deficits, Cranial Nerves II-XII are grossly intact.    Psychiatric:  Cooperative, Appropriate mood & affect, Normal judgment, Non-suicidal.       Review / Management   Results review:     Labs (Last four charted values)  WBC                  11.1 (OCT 25)   Hgb                  L 10.9 (OCT 25)   Hct                  L 34.2 (OCT 25)   Plt                  289 (OCT 25)   Na                   141 (OCT 25)   K                    4.2 (OCT 25)   CO2                  L 21 (OCT 25)   Cl                   H  109 (OCT 25)   Cr                   1.05 (OCT 25)   BUN                  H 20.8 (OCT 25)   Glucose Random       H 114 (OCT 25)   PT                   11.9 (OCT 25)   INR                  0.9 (OCT 25)   PTT                  25.5 (OCT 25) .    Radiology results   Rad Results (ST)   Accession: [id number]  Order: XR Chest 1 View  Report Dt/Tm: 10/25/2020 10:17  Report:   EXAMINATION  XR Chest 1 View     INDICATION  Dyspnea     Comparison: 19 October, 2020     FINDINGS  Lines/tubes/devices:  None present.     Enlargement of cardiac silhouette and associated prominence of the  central vascular structures are similar in the interval.  The trachea is midline. There is improved aeration of both lungs, with  no new focal consolidation identified. No pleural effusion or  pneumothorax is evident.     There is no acute osseous or extrathoracic abnormality.     IMPRESSION  Similar enlargement of cardiac silhouette, with continued improvement  of lung field opacities.

## 2022-04-30 NOTE — ED PROVIDER NOTES
Patient:   Kevan Queen             MRN: [id number]            FIN: [id number]               Age:   36 years     Sex:  Male     :  1985   Associated Diagnoses:   ROC (acute kidney injury); CHF, acute on chronic   Author:   Hudson Parada MD      Basic Information   Time seen: Date & time 2021 21:22:00.   History source: Patient.   Arrival mode: Private vehicle.   History limitation: None.   Additional information: Chief Complaint from Nursing Triage Note : Chief Complaint   2021 21:10 CDT      Chief Complaint           Pt reports he is taking max amount of his Torsemide, not urinated today. co of SOB  .   Provider/Visit info:   Time Seen:  Hudson Parada MD / 2021 21:09  .   History of Present Illness   The patient presents with difficulty breathing.  The onset was 2  days ago.  The course/duration of symptoms is constant.  Degree at onset mild.  Degree at present moderate.  There are Exacerbating factorss including exertion and lying flat.  The Relieving factors is rest.  Risk factors consist of congestive heart failure.  Prior episodes: occasional.  Therapy today: diuretic.  Associated symptoms: denies fever, denies chills, denies cough, denies nausea and denies vomiting.        Review of Systems   Constitutional symptoms:  No fever, no chills.    Respiratory symptoms:  Shortness of breath, orthopnea, cough.    Cardiovascular symptoms:  No chest pain, no palpitations.    Gastrointestinal symptoms:  No abdominal pain, no nausea, no vomiting.    Neurologic symptoms:  No headache, no dizziness.              Additional review of systems information: All other systems reviewed and otherwise negative.      Health Status   Allergies:    Allergic Reactions (Selected)  Severity Not Documented  Lactose- Abdominal distension, gaseous.  No Known Medication Allergies,    Allergies (2) Active Reaction  Lactose Abdominal distension, gaseous  No Known Medication Allergies None  Documented  .   Medications:  (Selected)   Prescriptions  Prescribed  Potassium Chloride (Eqv-K-Tab) 20 mEq oral tablet, extended release: 20 mEq = 1 tab(s), Oral, Daily, # 30 tab(s), 1 Refill(s), Pharmacy: [facility] #[id number], 190.5, cm, Height/Length Dosing, 06/21/21 8:50:00 CDT, 78.4, kg, Weight Dosing, 06/21/21 8:50:00 CDT  Vitamin D3 50,000 intl units (1250 mcg) oral capsule: 50,000 IntUnit = 1 cap(s), Oral, qWeek, # 13 cap(s), 0 Refill(s), Pharmacy: [facility] #[id number], 190.5, cm, Height/Length Dosing, 06/21/21 8:50:00 CDT, 78.4, kg, Weight Dosing, 06/21/21 8:50:00 CDT  albuterol 90 mcg/inh inhalation aerosol: 2 puff(s), INH, q4hr, PRN PRN as needed for wheezing, for cough or wheezing, # 1 EA, 0 Refill(s), [id number], 190, cm, Height/Length Dosing, 11/16/20 20:54:00 CST, 81.6, kg, Weight Dosing, 11/16/20 20:54:00 CST  metoprolol succinate 25 mg oral capsule, extended release: 25 mg = 1 cap(s), Oral, Daily, # 90 cap(s), 1 Refill(s), [id number], 190.5, cm, Height/Length Dosing, 06/21/21 8:50:00 CDT, 78.4, kg, Weight Dosing, 06/21/21 8:50:00 CDT  sacubitril-valsartan 49 mg-51 mg oral tablet: 1 tab(s), Oral, BID, # 60 tab(s), 3 Refill(s), Pharmacy: [facility], 190, cm, Height/Length Dosing, 01/26/21 15:40:00 CST, 79.1, kg, Weight Dosing, 01/26/21 15:40:00 CST  torsemide 20 mg oral tablet: 30 mg = 1.5 tab(s), Oral, Daily, # 45 tab(s), 0 Refill(s), other reason (Rx)  Documented Medications  Documented  ferrous sulfate 325 mg (65 mg elemental iron) oral delayed release tablet: 325 mg = 1 tab(s), Oral, Daily, # 30 tab(s), 0 Refill(s).      Past Medical/ Family/ Social History   Medical history:    Active  Nonischemic congestive cardiomyopathy (1433611409)  Resolved  Anemia of chronic disease (073546247): Onset on 10/26/2020 at 35 years.  Resolved., Reviewed as documented in chart.   Surgical history:    HEART TRANSPLANT LABS  (EXTERNAL) on 12/4/2020 at 35 Years.  Basic metabolic panel This panel must include the following: Calcium (11919) Carbon dioxide (96087) Chloride (85374) Creatinine (47200) Glucose (51359) Potassium (45754) Sodium (57522) Urea nitrogen (BUN) (62513) (99085) on 12/1/2020 at 35 Years.  Natriuretic peptide (69149) on 12/1/2020 at 35 Years.  Basic metabolic panel This panel must include the following: Calcium (25529) Carbon dioxide (25592) Chloride (32576) Creatinine (91264) Glucose (59737) Potassium (04407) Sodium (07273) Urea nitrogen (BUN) (36672) (68818) on 11/5/2020 at 35 Years.  Magnesium (78802) on 11/5/2020 at 35 Years.  Natriuretic peptide (64743) on 11/5/2020 at 35 Years.  CBC W Auto Diff Bld (07195-0) on 10/30/2020 at 35 Years.  Comprehensive metabolic panel This panel must include the following: Albumin (98544) Bilirubin, total (00179) Calcium (35609) Carbon dioxide (bicarbonate) (87254) Chloride (26173) Creatinine (22560) Glucose (78750) Phosphatase, alkaline (35010) Potassium (29059) on 10/30/2020 at 35 Years.  Magnesium (08223) on 10/30/2020 at 35 Years.  Phosphorus inorganic (phosphate); (10595) on 10/30/2020 at 35 Years.  CBC W Auto Diff Bld (98122-3) on 10/29/2020 at 35 Years.  Comprehensive metabolic panel This panel must include the following: Albumin (35623) Bilirubin, total (97682) Calcium (33273) Carbon dioxide (bicarbonate) (61889) Chloride (12126) Creatinine (94978) Glucose (01787) Phosphatase, alkaline (06870) Potassium (51170) on 10/29/2020 at 35 Years.  Magnesium (62546) on 10/29/2020 at 35 Years.  Phosphorus inorganic (phosphate); (60647) on 10/29/2020 at 35 Years.  CBC W Auto Diff Bld (48069-1) on 10/28/2020 at 35 Years.  Comprehensive metabolic panel This panel must include the following: Albumin (63144) Bilirubin, total (96106) Calcium (90170) Carbon dioxide (bicarbonate) (42204) Chloride (63944) Creatinine (41300) Glucose (04747) Phosphatase, alkaline (23391) Potassium (03624) on  10/28/2020 at 35 Years.  Magnesium (76153) on 10/28/2020 at 35 Years.  Phosphorus inorganic (phosphate); (40358) on 10/28/2020 at 35 Years.  Acute hepatitis panel This panel must include the following: Hepatitis A antibody (HAAb), IgM antibody (62845) Hepatitis B core antibody (HBcAb), IgM antibody (70626) Hepatitis B surface antigen (HBsAg) (22225) Hepatitis C antibody (60335) (32169) on 10/27/2020 at 35 Years.  Antibody; HIV-1 and HIV-2, single assay (47511) on 10/27/2020 at 35 Years.  CBC W Auto Diff Bld (36374-8) on 10/27/2020 at 35 Years.  Comprehensive metabolic panel This panel must include the following: Albumin (38710) Bilirubin, total (45050) Calcium (39281) Carbon dioxide (bicarbonate) (65519) Chloride (52252) Creatinine (85097) Glucose (73344) Phosphatase, alkaline (46246) Potassium (18548) on 10/27/2020 at 35 Years.  Ferritin (69423) on 10/27/2020 at 35 Years.  Iron (03578) on 10/27/2020 at 35 Years.  Magnesium (92955) on 10/27/2020 at 35 Years.  Thiamine (Vitamin B-1) (80174) on 10/27/2020 at 35 Years.  Thyroxine; free (49696) on 10/27/2020 at 35 Years.  Phosphorus inorganic (phosphate); (50974) on 10/27/2020 at 35 Years.  Thyroid stimulating hormone (TSH) (12971) on 10/27/2020 at 35 Years.  Acetone or other ketone bodies, serum; quantitative (27559) on 10/26/2020 at 35 Years.  CBC W Auto Diff Bld (64138-8) on 10/26/2020 at 35 Years.  Comprehensive metabolic panel This panel must include the following: Albumin (00141) Bilirubin, total (12804) Calcium (07843) Carbon dioxide (bicarbonate) (64430) Chloride (19341) Creatinine (55815) Glucose (43124) Phosphatase, alkaline (87886) Potassium (31620) on 10/26/2020 at 35 Years.  Cyanocobalamin (Vitamin B-12); (41572) on 10/26/2020 at 35 Years.  Folic acid; serum (50926) on 10/26/2020 at 35 Years.  HC TTE W OR WO FOL WCON,DOPPLERTRANSTHORACIC ECHO (TTE) COMPLETE W/ CONTRAST on 10/26/2020 at 35 Years.  Troponin, quantitative (14058) on 10/26/2020 at 35  Years.  Radiologic examination, chest; single view (71066) on 10/26/2020 at 35 Years.  Pyridoxal phosphate (Vitamin B-6) (59645) on 10/26/2020 at 35 Years.  Phosphorus inorganic (phosphate); (28018) on 10/26/2020 at 35 Years.  Natriuretic peptide (55859) on 10/26/2020 at 35 Years.  Magnesium (24041) on 10/26/2020 at 35 Years.  Lactate (lactic acid) (93250) on 10/26/2020 at 35 Years.  SARS-CoV-2 RdRp Resp Ql MARY+probe (99682-5) on 10/26/2020 at 35 Years.  Lipase (32084) on 10/26/2020 at 35 Years.  Electrocardiogram, routine ECG with at least 12 leads; tracing only, without interpretation and report (24119) on 10/25/2020 at 35 Years.  Radiologic examination, chest; single view (91052) on 10/15/2020 at 35 Years., Reviewed as documented in chart.   Family history:    Diabetes mellitus type 2  Mother  Father  Heart failure.  Father  Hypertension.  Father  Brother  Mother  Crohn's disease.  Brother  Heart murmur.  Brother  Mother  , Reviewed as documented in chart.   Social history:    Social & Psychosocial Habits    Alcohol  08/22/2021  Use: Past    Has alcohol use interfered with work or home life? No    Has anyone been hurt or at risk by your drinking? No    Concerns about alcohol use in household: No    Employment/School  01/07/2021  Status: Disabled, Unemployed    Exercise  01/07/2021  Times per week: Daily    Exercise type: Walking    Home/Environment  01/07/2021  Lives with: Spouse    Living situation: Home/Independent    Nutrition/Health  01/07/2021  Home Diet Low cholesterol, Low fat, Low sodium, Regular    Appetite Good    Sexual  01/07/2021  Sexually active: Yes    Current partners: 1    Do you think of your sexual orientation as: Straight or heterosexual    What is your current gender identity? (Check all that apply) Identifies as male    Substance Use  06/21/2021  Use: Current    Type: Marijuana    Frequency: Daily    Has drug use interfered with your work or home life? No    Concerns about substance abuse in  household: No    Tobacco  08/22/2021  Use: 4 or less cigarettes(less    Type: Cigarettes    Patient Wants Consult For Cessation Counseling No    Concerns about tobacco use in household: No    08/30/2021  Use: 4 or less cigarettes(less    Patient Wants Consult For Cessation Counseling N/A    Abuse/Neglect  08/22/2021  SHX Any signs of abuse or neglect No    Feels unsafe at home: No    Safe place to go: Yes    08/30/2021  SHX Any signs of abuse or neglect No    Feels unsafe at home: No    Safe place to go: Yes    Spiritual/Cultural  01/07/2021  Sikhism Preference Non denomination    Financial/Legal Situation  06/21/2021  Financial Issues Low income    Sources of Income None      01/07/2021  Branch of  Never in   , Reviewed as documented in chart.   Problem list:    Active Problems (4)  Cannabis dependence   Nonischemic congestive cardiomyopathy   Tobacco user   Tobacco user   .      Physical Examination               Vital Signs   Vital Signs   8/30/2021 21:10 CDT      Temperature Oral          36.8 DegC                             Temperature Oral (calculated)             98.24 DegF                             Peripheral Pulse Rate     69 bpm                             Respiratory Rate          20 br/min                             SpO2                      96 %                             Oxygen Therapy            Room air                             Systolic Blood Pressure   106 mmHg                             Diastolic Blood Pressure  72 mmHg  .      Vital Signs (last 24 hrs)_____  Last Charted___________  Temp Oral     36.8 DegC  (AUG 30 21:10)  Heart Rate Peripheral   69 bpm  (AUG 30 21:10)  Resp Rate         20 br/min  (AUG 30 21:10)  SBP      106 mmHg  (AUG 30 21:10)  DBP      72 mmHg  (AUG 30 21:10)  SpO2      96 %  (AUG 30 21:10)  Weight      80.9 kg  (AUG 30 21:10)  Height      192 cm  (AUG 30 21:10)  BMI      21.95  (AUG 30 21:10)  .   Measurements   8/30/2021 21:10 CDT       Weight Dosing             80.9 kg                             Weight Measured           80.9 kg                             Weight Measured and Calculated in Lbs     178.35 lb                             Height/Length Dosing      192 cm                             Height/Length Measured    192 cm                             Body Mass Index Measured  21.95 kg/m2  .   Basic Oxygen Information   8/30/2021 21:10 CDT      SpO2                      96 %                             Oxygen Therapy            Room air  .   General:  Alert, no acute distress.    Skin:  Intact, moist.    Head:  Normocephalic, atraumatic.    Neck:  Supple, no tenderness, no JVD, no carotid bruit, Step-off: distended neck veins.    Eye:  Pupils are equal, round and reactive to light, extraocular movements are intact, normal conjunctiva.    Ears, nose, mouth and throat:  Tympanic membranes clear, oral mucosa moist, no pharyngeal erythema or exudate.    Cardiovascular:  Regular rate and rhythm, No murmur, Normal peripheral perfusion, Edema: Bilateral, lower extremity, pretibial, 1+.    Respiratory:  Lungs are clear to auscultation, respirations are non-labored, breath sounds are equal, Symmetrical chest wall expansion.    Gastrointestinal:  Soft, Nontender, Non distended, Normal bowel sounds.    Neurological:  Alert and oriented to person, place, time, and situation, No focal neurological deficit observed.    Psychiatric:  Cooperative, appropriate mood & affect.       Medical Decision Making   Results review:  Lab results : Lab View   8/30/2021 21:55 CDT      Est Creat Clearance Ser   40.80 mL/min    8/30/2021 21:30 CDT      Sodium Lvl                139 mmol/L                             Potassium Lvl             3.9 mmol/L                             Chloride                  101 mmol/L                             CO2                       28 mmol/L                             Calcium Lvl               9.8 mg/dL                              Glucose Lvl               93 mg/dL                             BUN                       30.9 mg/dL  HI                             Creatinine                3.04 mg/dL  HI                             eGFR-AA                   30  LOW                             eGFR-MARY                  25 mL/min/1.73 m2  LOW                             Bili Total                0.9 mg/dL                             Bili Direct               0.4 mg/dL                             Bili Indirect             0.50 mg/dL                             AST                       36 unit/L  HI                             ALT                       27 unit/L                             Alk Phos                  86 unit/L                             Total Protein             7.6 gm/dL                             Albumin Lvl               3.9 gm/dL                             Globulin                  3.7 gm/dL  HI                             A/G Ratio                 1.1 ratio                             BNP                       3,522.8 pg/mL  HI                             Total CK                  234 U/L  HI                             CK MB                     1.2 ng/mL                             Troponin-I                0.067 ng/mL  HI                             WBC                       8.5 x10(3)/mcL                             RBC                       4.57 x10(6)/mcL                             Hgb                       13.6 gm/dL                             Hct                       39.2 %  LOW                             Platelet                  180 x10(3)/mcL                             MCV                       85.8 fL                             MCH                       29.8 pg                             MCHC                      34.7 gm/dL                             RDW                       15.2 %  HI                             MPV                       10.5 fL  HI                             Abs Neut                   5.24 x10(3)/mcL                             Neutro Auto               62 %  NA                             Lymph Auto                28 %  NA                             Mono Auto                 8 %  NA                             Eos Auto                  1 %  NA                             Abs Eos                   0.1 x10(3)/mcL                             Basophil Auto             0 %  NA                             Abs Neutro                5.24 x10(3)/mcL                             Abs Lymph                 2.4 x10(3)/mcL                             Abs Mono                  0.7 x10(3)/mcL                             Abs Baso                  0.0 x10(3)/mcL                             NRBC%                     0.0 %                             IG%                       0 %  NA                             IG#                       0.020  NA  .   Chest X-Ray:  Time reported 8/30/2021 22:45:00, interpretation by Emergency Physician, Mild perihilar pulmonary vascular congestion.       Reexamination/ Reevaluation   Time: 8/30/2021 22:53:00 .   Interventions: PowerOrders   Consults:  [facility] ED Consult Internal Medicine (Order): 8/30/2021 22:53 CDT, ROC, CHF.   Notes: On review of labs today, patient has an ROC with an elevated creatinine over 3.0.  Patient's baseline creatinine is 1-2.  Patient also has an elevated BNP, likely falsely elevated due the worsening renal function.  Overall, patient appears non-toxic.  Patient does have some degree of pulmonary congestion on CXR and distended neck veins while lying supine.  IM has been consulted for ROC and CHF exacerbation..      Impression and Plan   Diagnosis   ROC (acute kidney injury) ([id number] N17.9)   CHF, acute on chronic (NBI64-CX I50.9)      Calls-Consults   -  8/30/2021 22:54:00 , Internal Medicine, consult.    Plan   Condition: Improved, Stable.    Disposition: Admit time  8/30/2021 23:00:00, Admit to Inpatient Telemetry Unit.    Counseled: Patient, Family,  Regarding diagnosis, Regarding diagnostic results, Regarding treatment plan, Patient indicated understanding of instructions.

## 2022-04-30 NOTE — ED PROVIDER NOTES
Patient:   Kevan Queen             MRN: [id number]            FIN: [id number]               Age:   35 years     Sex:  Male     :  1985   Associated Diagnoses:   NSTEMI (non-ST elevated myocardial infarction); Nonischemic cardiomyopathy; Congestive heart failure (CHF)   Author:   Natividad ROSEN, Otoniel ALFRED      Basic Information   Time seen: Date & time 10/25/2020 09:25:00.   History source: Patient, mother.   Arrival mode: Private vehicle.   History limitation: None.   Additional information: Patient's physician(s): Cardiologist at [facility]. PCP in [place]., I have assummed care of this patient . DWMMD  @ [id number].      History of Present Illness   The patient presents with difficulty breathing, 34 y/o male who presents with sob which worsened the day he was discharged. also c/o abdominal pain. he has new dx of chf, on life vest. recent admission here and discharged 2 days ago. moriah hernandez,     34 y/o black male with a recent cardiac hx and possible diagnosis of cardiomyopathy with a life vest presents to the ED with mother at bedside for SOB that started 2 days ago. Pt also reports upper abdominal pain and bloating with the same onset. Pt reports no abdominal pain when he was discharged however reports SOB has stayed the same. He reports lying down exacerbates his SOB however denies fever, vomiting, diarrhea, dysuria, BM problems, or productive cough. Pt states last PO intake around 0630 this morning and states he took his morning medication today.  and   Dx dilated NICMO EF 10-15%   clean heart cath last week .  The onset was 2  days ago.  The course/duration of symptoms is constant.  Degree at onset moderate.  Degree at present moderate.  The Exacerbating factors is lying flat.  The Relieving factors is none.  Risk factors consist of recent hospitalization.  Prior episodes: none.  Therapy today: none.  Associated symptoms: abdominal pain, denies chest pain, denies fever, denies chills, denies cough, denies  nausea, denies vomiting, denies back pain, denies weight gain, denies hemoptysis, denies headache, denies dizziness, denies diarrhea, denies fatigue, denies focal weakness, denies dysuria and denies blood in stool.  Additional history: none.        Review of Systems   Constitutional symptoms:  No fever,    Skin symptoms:  Negative except as documented in HPI.   Eye symptoms:  Negative except as documented in HPI.   ENMT symptoms:  Negative except as documented in HPI.   Respiratory symptoms:  Shortness of breath, orthopnea, no cough, no sputum production.    Cardiovascular symptoms:  Negative except as documented in HPI.   Gastrointestinal symptoms:  Abdominal pain, Abdominal bloating. Denies BM problems. , no vomiting, no diarrhea.    Genitourinary symptoms:  No dysuria,    Musculoskeletal symptoms:  Negative except as documented in HPI.   Neurologic symptoms:  Negative except as documented in HPI.   Psychiatric symptoms:  Negative except as documented in HPI.   Endocrine symptoms:  Negative except as documented in HPI.   Hematologic/Lymphatic symptoms:  Negative except as documented in HPI.   Allergy/immunologic symptoms:  Negative except as documented in HPI.             Additional review of systems information: All other systems reviewed and otherwise negative.      Health Status   Allergies:    Allergic Reactions (Selected)  Severity Not Documented  Lactose- Abdominal distension, gaseous..   Medications:  (Selected)   Prescriptions  Prescribed  Coreg 3.125 mg oral tablet: 6.25 mg = 2 tab(s), Oral, BID, # 120 tab(s), 0 Refill(s)  Lasix 20 mg oral tablet: 20 mg = 1 tab(s), Oral, Daily, # 30 tab(s), 2 Refill(s)  Xanax 0.25 mg oral tablet: 0.25 mg = 1 tab(s), Oral, BID, PRN PRN anxiety, X 7 day(s), # 14 tab(s), 0 Refill(s)  aspirin 325 mg oral Delayed Release (EC) tablet: 325 mg = 1 tab(s), Oral, Daily, may get otc, # 90 tab(s), 0 Refill(s).   Immunizations: Flu and Pneumonia UTD. , no tetanus up to date.      Past  Medical/ Family/ Social History   Medical history: Cardiac hx. Cardiomyopathy. .   Surgical history: Negative.   Family history: Father- , cardiac hx.   Mother- alive, no medical hx. .   Social history: Alcohol use: Denies, Tobacco use: Quit 1 week ago. , Drug use: Denies, Occupation: On disability, Family/social situation:  but . .      Physical Examination               Vital Signs   Vital Signs   10/25/2020 10:02 CDT     Peripheral Pulse Rate     116 bpm  HI                             Heart Rate Monitored      113 bpm  HI                             Respiratory Rate          27 br/min  HI                             SpO2                      99 %                             Oxygen Therapy            Room air                             Systolic Blood Pressure   113 mmHg                             Diastolic Blood Pressure  86 mmHg                             Mean Arterial Pressure, Cuff              95 mmHg    10/25/2020 9:23 CDT      Temperature Oral          36.8 DegC                             Temperature Oral (calculated)             98.24 DegF                             Peripheral Pulse Rate     114 bpm  HI                             Respiratory Rate          16 br/min                             SpO2                      99 %                             Oxygen Therapy            Room air                             Systolic Blood Pressure   99 mmHg                             Diastolic Blood Pressure  67 mmHg  .   Measurements   10/25/2020 9:23 CDT      Weight Dosing             75 kg                             Weight Measured and Calculated in Lbs     165.34 lb                             Weight Estimated          75 kg                             Height/Length Dosing      190 cm                             Height/Length Estimated   190 cm                             Body Mass Index Estimated 20.78 kg/m2  .   Basic Oxygen Information   10/25/2020 10:02 CDT     SpO2                       99 %                             Oxygen Therapy            Room air    10/25/2020 9:23 CDT      SpO2                      99 %                             Oxygen Therapy            Room air  .   General:  Alert, no acute distress, Young, muscular, black male. , not anxious, not ill-appearing.    Neurological:  Alert and oriented to person, place, time, and situation, No focal neurological deficit observed, CN II-XII intact, normal sensory observed, normal motor observed, normal speech observed, normal coordination observed.    Skin:  Warm, dry, no rash, normal for ethnicity   Head:  Normocephalic, atraumatic   Neck:  Supple, trachea midline, no tenderness, no JVD, no carotid bruit.    Eye:  Pupils are equal, round and reactive to light, extraocular movements are intact, normal conjunctiva   Ears, nose, mouth and throat:  Tympanic membranes clear, oral mucosa moist, no pharyngeal erythema or exudate.    Cardiovascular:  No murmur, Normal peripheral perfusion, Tachycardia   Respiratory:  Lungs are clear to auscultation, respirations are non-labored, breath sounds are equal   Chest wall:  No tenderness, No deformity.    Back:  Nontender, Normal range of motion, Normal alignment, no step-offs.    Musculoskeletal:  Normal ROM, normal strength, no tenderness, no swelling, no deformity.    Gastrointestinal:  Soft, Non distended, Normal bowel sounds, No organomegaly, Tenderness to upper half of abdominal wall with the slightest touch. No tenderness to lower half of abdomen. .    Genitourinary:  no CVA tenderness.   Lymphatics:  No lymphadenopathy.   Psychiatric:  Cooperative, appropriate mood & affect, normal judgment, non-suicidal.       Medical Decision Making   Differential Diagnosis:  Pneumonia, bronchitis, congestive heart failure, anxiety, pancreatitis.    Documents reviewed:  Emergency department nurses' notes.   Orders  Launch Order Profile (Selected)   Inpatient Orders  Ordered  30 Day Readmission: 10/25/20  "9:31:49 CDT, Stop date 10/25/20 9:31:49 CDT, "This patient has had an inpatient, observation, outpatient bedded or emergency visit within the last 30 days."  EKG: 10/25/20 10:08:00 CDT, Stat, 10/25/20 10:08:00 CDT, -1, -1, 10/25/20 10:08:00 CDT  HT Screening: 10/25/20 9:23:12 CDT  post xry please have patient put on life vest and face mask: 10/25/20 10:09:00 CDT, post xry please have patient put on life vest and face mask  Ordered (Collected)  Automated Diff: NOW collect, 10/25/20 9:45:00 CDT, Blood, Collected, Stop date 10/25/20 9:45:00 CDT, Lab Collect, Print Label By Order Location, 10/25/20 9:27:00 CDT  CBC w/ Auto Diff: NOW collect, 10/25/20 9:45:40 CDT, BLOOD, Collected, Stop date 10/25/20 9:45:00 CDT, Lab Collect  CMP: STAT collect, 10/25/20 9:45:40 CDT, BLOOD, Collected, Stop date 10/25/20 9:45:00 CDT, Lab Collect  Lipase Level: STAT collect, 10/25/20 9:45:40 CDT, BLOOD, Collected, Stop date 10/25/20 9:45:00 CDT, Lab Collect  POC BNP iSTAT request: BLOOD, STAT collect, Collected, 10/25/20 9:45:40 CDT, Stop date 10/25/20 9:45:00 CDT, Lab Collect, Print Label By Order Location  PT: STAT collect, 10/25/20 9:45:40 CDT, BLOOD, Collected, Stop date 10/25/20 9:45:00 CDT, Lab Collect  PTT: STAT collect, 10/25/20 9:45:40 CDT, BLOOD, Collected, Stop date 10/25/20 9:45:00 CDT, Lab Collect  Troponin-I: STAT collect, 10/25/20 9:45:40 CDT, BLOOD, Collected, Stop date 10/25/20 9:27:00 CDT, Lab Collect  Ordered (Dispatched)  H Pylori Antibody: Stat collect, 10/25/20 10:07:00 CDT, Blood, Stop date 10/25/20 10:07:00 CDT, Lab Collect, Print Label By Order Location, 10/25/20 10:07:00 CDT  Ordered (Exam Started)  XR Chest 1 View: Stat, 10/25/20 9:27:00 CDT, Dyspnea, None, Stretcher, Rad Type, Not Scheduled, 10/25/20 9:27:00 CDT.   Electrocardiogram:  Time 10/25/2020 09:26:00, rate 112, EP Interp, sinus Tachycardia biatrial enlargement nonspecific ST and T wave changes possibly rate dependent, Compared with 12-lead EKG 22 October " 2020 no acute changes noted.    Results review:  Lab results : Lab View   10/25/2020 10:00 CDT     POC BNP iSTAT             2,474 pg/mL  HI    10/25/2020 9:45 CDT      Sodium Lvl                141 mmol/L                             Potassium Lvl             4.2 mmol/L                             Chloride                  109 mmol/L  HI                             CO2                       21 mmol/L  LOW                             Calcium Lvl               9.0 mg/dL                             Glucose Lvl               114 mg/dL  HI                             BUN                       20.8 mg/dL  HI                             Creatinine                1.05 mg/dL                             eGFR-AA                   >60  NA                             eGFR-MARY                  >60  NA                             Bili Total                0.4 mg/dL                             Bili Direct               0.2 mg/dL                             Bili Indirect             0.20 mg/dL                             AST                       56 unit/L  HI                             ALT                       64 unit/L  HI                             Alk Phos                  55 unit/L                             Total Protein             6.7 gm/dL                             Albumin Lvl               3.4 gm/dL  LOW                             Globulin                  3.3 gm/dL                             A/G Ratio                 1.0 ratio  LOW                             Lipase Lvl                24 U/L                             Troponin-I                0.161 ng/mL  HI                             PT                        11.9 second(s)                             INR                       0.9                             PTT                       25.5 second(s)                             WBC                       11.1 x10(3)/mcL                             RBC                       3.84 x10(6)/mcL  LOW                              Hgb                       10.9 gm/dL  LOW                             Hct                       34.2 %  LOW                             Platelet                  289 x10(3)/mcL                             MCV                       89.1 fL                             MCH                       28.4 pg                             MCHC                      31.9 gm/dL  LOW                             RDW                       17.4 %  HI                             MPV                       9.9 fL                             Abs Neut                  6.85 x10(3)/mcL                             Neutro Auto               61 %  NA                             Lymph Auto                30 %  NA                             Mono Auto                 7 %  NA                             Eos Auto                  1 %  NA                             Abs Eos                   0.1 x10(3)/mcL                             Basophil Auto             0 %  NA                             Abs Neutro                6.85 x10(3)/mcL                             Abs Lymph                 3.4 x10(3)/mcL                             Abs Mono                  0.7 x10(3)/mcL                             Abs Baso                  0.0 x10(3)/mcL                             H pylori Ab               Negative  .   Chest X-Ray:  Time reported 10/25/2020 10:17:00, Report:   EXAMINATION  XR Chest 1 View     INDICATION  Dyspnea     Comparison: 19 October, 2020     FINDINGS  Lines/tubes/devices:  None present.     Enlargement of cardiac silhouette and associated prominence of the  central vascular structures are similar in the interval.  The trachea is midline. There is improved aeration of both lungs, with  no new focal consolidation identified. No pleural effusion or  pneumothorax is evident.     There is no acute osseous or extrathoracic abnormality.     IMPRESSION  Similar enlargement of cardiac silhouette, with continued improvement  of lung field  opacities.  .       Reexamination/ Reevaluation   Time: 10/25/2020 11:44:00 .   Vital signs   results included from flowsheet : Vital Signs   10/25/2020 11:00 CDT     Heart Rate Monitored      104 bpm  HI                             Respiratory Rate          12 br/min                             SpO2                      100 %                             Oxygen Therapy            Oxymask                             Oxygen Flow Rate          5 L/min                             Systolic Blood Pressure   110 mmHg                             Diastolic Blood Pressure  81 mmHg                             Mean Arterial Pressure, Cuff              91 mmHg     Interventions: consult has been obtained with Dr. Branham, Kylie   pt  is admitted to his service observation status with a consultation to cardiology.      Procedure   Critical care note   Total time: 30 minutes spent engaged in work directly related to patient care and/ or available for direct patient care.   Critical condition(s) addressed for impending deterioration include: respiratory.   Associated risk factors: dysrhythmia, nstemi? .   Management: bedside assessment, supervision of care, Interpretation (chest x-ray, electrocardiogram, blood pressure), Interventions (hemodynamic management, EF 10-15%    dilated NICMO ), Case review (medical specialist, nursing, family, MOTHER at bedside ), Alternate history family.   Performed by: self.      Impression and Plan   Diagnosis   NSTEMI (non-ST elevated myocardial infarction) (BSZ68-HR I21.4)   Nonischemic cardiomyopathy (GAI03-XC I42.8)   Congestive heart failure (CHF) (MKT26-GZ I50.9)      Calls-Consults   -  10/25/2020 11:47:00 , Emmanuel ROSEN, Brett Vallejo MD, Jd SCOTT NP with [facility] .    Plan   Disposition: Admit time  10/25/2020 11:47:00, Place in Observation Telemetry Unit.    Counseled: Patient, Family (Mother), Regarding diagnosis, Regarding diagnostic results, Regarding treatment plan, Patient indicated understanding  of instructions, Mother understood. .    Notes: I, Jameson Balderrama, acted solely as a scribe for and in the presence of Dr. Hensno who performed the service., I, Otoniel Henson MD, a physician licensed to practice in this state, have  performed the physical evaluation,  history gathering,  and medical decision making that is reflected in this record..   GOLDEN Henson MD.

## 2022-04-30 NOTE — ED PROVIDER NOTES
Patient:   Kevan Queen             MRN: [id number]            FIN: [id number]               Age:   36 years     Sex:  Male     :  1985   Associated Diagnoses:   CHF exacerbation; Acute epigastric pain; Abdominal pain   Author:   Femi ROSEN, Andres BARRETT      Basic Information   Time seen: Date & time 2021 17:05:00.   History source: Patient.   Arrival mode: Private vehicle, wheelchair.   History limitation: None.   Additional information: Patient's physician(s): Rosio Devlin      History of Present Illness   The patient presents with Patient states upper abdominal pain with nausea, vomiting, and SOB. states hx. of CHF (manjula, LUCÍA).  and     I, Farhad Murguia , acted solely as a scribe for and in the presence of Dr. Kahn who performed the service. 0700    37 y/o M with hx of CHF presents to ED with c/o upper abdominal pain, nausea, vomiting, and SOB. Pt reports vomiting all day and the abdominal pain feels like burning and crampy. Pt states having abnormal bowel movements; paste like consistency..  The onset was chronic.  The course/duration of symptoms is worsening.  The character of symptoms is crampy and pressure.  The degree at onset was moderate.  The Location of pain at onset was abdominal.  The degree at present is moderate.  The Location of pain at present is abdominal.  Radiating pain: none. The exacerbating factor is none.  The relieving factor is none.  Therapy today: none.  Risk factors consist of CHF.  Associated symptoms: chest pain, nausea, vomiting and shortness of breath.  Additional history: none.        Review of Systems   Constitutional symptoms:  No fever, no chills.    Skin symptoms   Respiratory symptoms:  Shortness of breath, No cough,    Cardiovascular symptoms:  Chest pain, No syncope,    Gastrointestinal symptoms:  Abdominal pain, vomiting.    Genitourinary symptoms   Musculoskeletal symptoms:  No Muscle pain,    Neurologic symptoms:  No headache, no altered level of  consciousness.              Additional review of systems information: All other systems reviewed and otherwise negative.      Health Status   Allergies:    Allergic Reactions (Selected)  Severity Not Documented  Bumex- Hives.  Lactose- Abdominal distension, gaseous..   Medications:  (Selected)   Prescriptions  Prescribed  Carafate 1 g oral tablet: 1 gm = 1 tab(s), Oral, AC & HS, # 28 tab(s), 0 Refill(s), Pharmacy: [facility] #[id number], 198.12, cm, Height/Length Dosing, 09/14/21 22:47:00 CDT, 84.1, kg, Weight Dosing, 09/14/21 22:47:00 CDT  Lasix 40 mg oral tablet: 40 mg = 1 tab(s), Oral, Daily, MONIOTR YOUR WEIGHT DAUILY, IF > 2 LBS WEIGHT GAIN OVER NIGHT, TAKDE AN EXTRA LASIX 40 MG THAT DAY, # 30 tab(s), 0 Refill(s), Pharmacy: [facility] #[id number], 198.12, cm, Height/Length Dosing, 09/14/21 22:47:00 CDT...  Pantoprazole 40 mg ORAL EC-Tablet: 40 mg = 1 tab(s), Oral, Daily, # 60 tab(s), 0 Refill(s), Pharmacy: [facility] #[id number], 198.12, cm, Height/Length Dosing, 09/14/21 22:47:00 CDT, 84.1, kg, Weight Dosing, 09/14/21 22:47:00 CDT  albuterol 90 mcg/inh inhalation aerosol: 2 puff(s), INH, q4hr, PRN PRN as needed for wheezing, for cough or wheezing, # 1 EA, 0 Refill(s), Pharmacy: [facility] #[id number], 190, cm, Height/Length Dosing, 11/16/20 20:54:00 CST, 81.6, kg, Weight Dosing, 11/16/20 20:54:00 CST  lactulose 10 g/15 mL oral syrup: See Instructions, TAKE 15 ML BY MOUTH DAILY FOR 5 DAYS, # 75 mL, 0 Refill(s), Pharmacy: [facility] #[id number], 190, cm, Height/Length Dosing, 09/23/21 22:01:00 CDT, 82.4, kg, Weight Dosing, 09/23/21 22:01:00 CDT  metoprolol succinate 50 mg oral tablet, extended release: 50 mg = 1 tab(s), Oral, Daily, Further refills from you primary cardiologist at [facility], # 30 tab(s), 1 Refill(s), Pharmacy: [facility] #[id number], 198.12, cm, Height/Length Dosing, 09/14/21 22:47:00 CDT, 84.1, kg, Weight Dosing, 09/14/21 22:47:00 CDT...  Documented  Medications  Documented  Colace 100 mg oral capsule: 100 mg = 1 cap(s), Oral, q12hr, 0 Refill(s)  Entresto 49 mg-51 mg oral tablet: 1 tab(s), Oral, BID  MiraLax: 17 gm, Oral, Daily, OVER THE COUNTER, 0 Refill(s)  capsaicin 0.075% topical cream: 1 skinny, TOP, TID, 0 Refill(s)  ferrous sulfate 325 mg (65 mg elemental iron) oral delayed release tablet: 325 mg = 1 tab(s), Oral, Daily, # 30 tab(s), 0 Refill(s).      Past Medical/ Family/ Social History   Medical history:    Active  Nonischemic congestive cardiomyopathy (9304431125)  Resolved  Anemia of chronic disease (110518320): Onset on 10/26/2020 at 35 years.  Resolved..   Surgical history:    Esophagogastroduodenoscopy on 9/16/2021 at 36 Years.  Comments:  9/16/2021 10:40 Suzan Wei  auto-populated from documented surgical case  Biopsy Gastrointestinal on 9/16/2021 at 36 Years.  Comments:  9/16/2021 10:40 Suzan Wei  auto-populated from documented surgical case  HEART TRANSPLANT LABS (EXTERNAL) on 12/4/2020 at 35 Years.  Basic metabolic panel This panel must include the following: Calcium (06630) Carbon dioxide (41073) Chloride (53726) Creatinine (63017) Glucose (98018) Potassium (97683) Sodium (35776) Urea nitrogen (BUN) (64134) (60433) on 12/1/2020 at 35 Years.  Natriuretic peptide (29760) on 12/1/2020 at 35 Years.  Basic metabolic panel This panel must include the following: Calcium (58896) Carbon dioxide (96284) Chloride (17175) Creatinine (90204) Glucose (22362) Potassium (93654) Sodium (20911) Urea nitrogen (BUN) (64327) (22781) on 11/5/2020 at 35 Years.  Magnesium (94888) on 11/5/2020 at 35 Years.  Natriuretic peptide (00277) on 11/5/2020 at 35 Years.  CBC W Auto Diff Bld (87555-8) on 10/30/2020 at 35 Years.  Comprehensive metabolic panel This panel must include the following: Albumin (11746) Bilirubin, total (56707) Calcium (54936) Carbon dioxide (bicarbonate) (47648) Chloride (85331) Creatinine (79286) Glucose (67135) Phosphatase, alkaline  (40622) Potassium (25805) on 10/30/2020 at 35 Years.  Magnesium (55696) on 10/30/2020 at 35 Years.  Phosphorus inorganic (phosphate); (64472) on 10/30/2020 at 35 Years.  CBC W Auto Diff Bld (85460-9) on 10/29/2020 at 35 Years.  Comprehensive metabolic panel This panel must include the following: Albumin (00011) Bilirubin, total (92293) Calcium (62166) Carbon dioxide (bicarbonate) (01878) Chloride (51876) Creatinine (35871) Glucose (18394) Phosphatase, alkaline (40943) Potassium (20613) on 10/29/2020 at 35 Years.  Magnesium (47169) on 10/29/2020 at 35 Years.  Phosphorus inorganic (phosphate); (21646) on 10/29/2020 at 35 Years.  CBC W Auto Diff Bld (20142-4) on 10/28/2020 at 35 Years.  Comprehensive metabolic panel This panel must include the following: Albumin (86926) Bilirubin, total (39853) Calcium (42488) Carbon dioxide (bicarbonate) (75264) Chloride (69258) Creatinine (19307) Glucose (65293) Phosphatase, alkaline (70274) Potassium (48651) on 10/28/2020 at 35 Years.  Magnesium (96200) on 10/28/2020 at 35 Years.  Phosphorus inorganic (phosphate); (60975) on 10/28/2020 at 35 Years.  Acute hepatitis panel This panel must include the following: Hepatitis A antibody (HAAb), IgM antibody (04856) Hepatitis B core antibody (HBcAb), IgM antibody (15027) Hepatitis B surface antigen (HBsAg) (07459) Hepatitis C antibody (41841) (03710) on 10/27/2020 at 35 Years.  Antibody; HIV-1 and HIV-2, single assay (90913) on 10/27/2020 at 35 Years.  CBC W Auto Diff Bld (78482-4) on 10/27/2020 at 35 Years.  Comprehensive metabolic panel This panel must include the following: Albumin (70734) Bilirubin, total (33856) Calcium (72299) Carbon dioxide (bicarbonate) (02861) Chloride (67613) Creatinine (90448) Glucose (74948) Phosphatase, alkaline (91088) Potassium (70625) on 10/27/2020 at 35 Years.  Ferritin (52251) on 10/27/2020 at 35 Years.  Iron (97754) on 10/27/2020 at 35 Years.  Magnesium (56801) on 10/27/2020 at 35 Years.  Thiamine  (Vitamin B-1) (22529) on 10/27/2020 at 35 Years.  Thyroxine; free (79875) on 10/27/2020 at 35 Years.  Phosphorus inorganic (phosphate); (88480) on 10/27/2020 at 35 Years.  Thyroid stimulating hormone (TSH) (44815) on 10/27/2020 at 35 Years.  Acetone or other ketone bodies, serum; quantitative (33348) on 10/26/2020 at 35 Years.  CBC W Auto Diff Bld (74056-1) on 10/26/2020 at 35 Years.  Comprehensive metabolic panel This panel must include the following: Albumin (00172) Bilirubin, total (74562) Calcium (82293) Carbon dioxide (bicarbonate) (55814) Chloride (88652) Creatinine (28877) Glucose (46052) Phosphatase, alkaline (44861) Potassium (82132) on 10/26/2020 at 35 Years.  Cyanocobalamin (Vitamin B-12); (62452) on 10/26/2020 at 35 Years.  Folic acid; serum (16234) on 10/26/2020 at 35 Years.  HC TTE W OR WO FOL WCON,DOPPLERTRANSTHORACIC ECHO (TTE) COMPLETE W/ CONTRAST on 10/26/2020 at 35 Years.  Troponin, quantitative (64477) on 10/26/2020 at 35 Years.  Radiologic examination, chest; single view (20840) on 10/26/2020 at 35 Years.  Pyridoxal phosphate (Vitamin B-6) (02750) on 10/26/2020 at 35 Years.  Phosphorus inorganic (phosphate); (85739) on 10/26/2020 at 35 Years.  Natriuretic peptide (34804) on 10/26/2020 at 35 Years.  Magnesium (66333) on 10/26/2020 at 35 Years.  Lactate (lactic acid) (87628) on 10/26/2020 at 35 Years.  SARS-CoV-2 RdRp Resp Ql MARY+probe (05749-1) on 10/26/2020 at 35 Years.  Lipase (35625) on 10/26/2020 at 35 Years.  Electrocardiogram, routine ECG with at least 12 leads; tracing only, without interpretation and report (60209) on 10/25/2020 at 35 Years.  Radiologic examination, chest; single view (33067) on 10/15/2020 at 35 Years..   Family history:    Diabetes mellitus type 2  Mother  Father  Heart failure.  Father  Hypertension.  Father  Brother  Mother  Crohn's disease.  Brother  Heart murmur.  Brother  Mother  .   Social history: Alcohol use: Denies, Tobacco use: former, more than 30  days ago,  Drug use: Marijuana.      Physical Examination               Vital Signs   Vital Signs   9/25/2021 11:39 CDT      Temperature Oral          36.8 DegC                             Temperature Oral (calculated)             98.24 DegF                             Peripheral Pulse Rate     93 bpm                             Heart Rate Monitored      87 bpm                             Respiratory Rate          20 br/min                             SpO2                      98 %                             Oxygen Therapy            Room air                             Systolic Blood Pressure   108 mmHg                             Diastolic Blood Pressure  78 mmHg                             Mean Arterial Pressure, Cuff              88 mmHg                             Blood Pressure Location   Left arm    9/25/2021 8:00 CDT       Heart Rate Monitored      90 bpm    9/25/2021 7:25 CDT       Temperature Oral          36.8 DegC                             Temperature Oral (calculated)             98.24 DegF                             Peripheral Pulse Rate     93 bpm                             Respiratory Rate          20 br/min                             SpO2                      98 %                             Systolic Blood Pressure   108 mmHg                             Diastolic Blood Pressure  78 mmHg                             Mean Arterial Pressure, Cuff              88 mmHg    9/25/2021 7:00 CDT       Oxygen Therapy            Room air                             Blood Pressure Location   Left arm    9/25/2021 6:11 CDT       Temperature Oral          36.7 DegC                             Temperature Oral (calculated)             98.06 DegF                             Peripheral Pulse Rate     105 bpm  HI                             SpO2                      92 %  LOW                             Systolic Blood Pressure   110 mmHg                             Diastolic Blood Pressure  79 mmHg                              Mean Arterial Pressure, Cuff              90 mmHg    9/25/2021 4:00 CDT       Heart Rate Monitored      87 bpm                             Oxygen Therapy            Room air    9/25/2021 3:31 CDT       Respiratory Rate          16 br/min                             Oxygen Therapy            Room air    9/25/2021 2:00 CDT       Respiratory Rate          18 br/min    9/25/2021 1:00 CDT       Temperature Oral          36.8 DegC                             Temperature Oral (calculated)             98.24 DegF                             Peripheral Pulse Rate     101 bpm  HI                             Respiratory Rate          18 br/min                             SpO2                      98 %                             Oxygen Therapy            Room air                             Systolic Blood Pressure   111 mmHg                             Diastolic Blood Pressure  74 mmHg    9/25/2021 0:00 CDT       Heart Rate Monitored      86 bpm                             Oxygen Therapy            Room air  .   Basic Oxygen Information   9/25/2021 11:39 CDT      SpO2                      98 %                             Oxygen Therapy            Room air    9/25/2021 7:25 CDT       SpO2                      98 %    9/25/2021 7:00 CDT       Oxygen Therapy            Room air    9/25/2021 6:11 CDT       SpO2                      92 %  LOW    9/25/2021 4:00 CDT       Oxygen Therapy            Room air    9/25/2021 3:31 CDT       Oxygen Therapy            Room air    9/25/2021 1:00 CDT       SpO2                      98 %                             Oxygen Therapy            Room air    9/25/2021 0:00 CDT       Oxygen Therapy            Room air  .   General:  Alert, mild distress.    Skin:  Warm, dry.    Head:  Normocephalic.   Eye   Cardiovascular:  Regular rate and rhythm, Normal peripheral perfusion.    Respiratory:  Respirations are non-labored, breath sounds are equal, Symmetrical chest wall expansion, Breath sounds:  "Crackles present, wheezes present mild.    Gastrointestinal:  Soft, Non distended, Normal bowel sounds, No organomegaly, Tenderness: Moderate, epigastric, Guarding: Voluntary.    Musculoskeletal:  No deformity.   Neurological:  No focal neurological deficit observed, normal speech observed.    Psychiatric:  Cooperative, Mood and affect: Anxious.       Medical Decision Making   Differential Diagnosis:  Abdominal pain, cholecystitis, gastroesophageal reflux disease, gastroenteritis, peptic ulcer disease, gastritis.    Rationale:  States his symptoms have worsened since he left the hospital yesterday.  Can only walk from his room to the bathroom without feeling very short of breath.  States today he actually fell like he might die due to the trouble breathing.  Having pain in his upper abdomen has a history of this likely peptic ulcer disease.  Was seen by GI when he was in the hospital.  BNP higher now than when he left.  I have asked the hospitalist to readmit him for further diuresis.  I counseled him on importance of wearing his LifeVest.   Documents reviewed:  Emergency department nurses' notes.   Orders  Launch Order Profile (Selected)   Inpatient Orders  Ordered  30 Day Readmission: 21 17:07:10 CDT, Stop date 21 17:07:10 CDT, "This patient has had an inpatient, observation, outpatient bedded or emergency visit within the last 30 days."  HT Screenin21 16:59:29 CDT  Possible Sepsis: 21 18:33:34 CDT, Once  Ordered (Collected)  POC BNP iSTAT request: BLOOD, ROUTINE collect, Collected, 21 17:14:24 CDT, Stop date 21 17:14:00 CDT, Lab Collect, Print Label By Order Location  POC iSTAT ER Troponin request: BLOOD, STAT collect, Collected, 21 17:14:24 CDT, Stop date 21 17:14:00 CDT, Lab Collect, Print Label By Order Location  Ordered (Exam Completed)  XR Chest 2 Views: Stat, 21 17:07:00 CDT, Chest Pain, None, Stretcher, Rad Type, Not Scheduled, 21 17:07:00 " CDT  Completed  Automated Diff: NOW collect, 21 17:14:00 CDT, Blood, Collected, Stop date 21 17:14:00 CDT, Lab Collect, Print Label By Order Location, 21 17:07:00 CDT  CBC w/ Auto Diff: NOW collect, 21 17:14:24 CDT, BLOOD, Collected, Stop date 21 17:14:00 CDT, Lab Collect  CMP: STAT collect, 21 17:14:24 CDT, BLOOD, Collected, Stop date 21 17:14:00 CDT, Lab Collect  EK21 17:07:00 CDT, 21 17:07:00 CDT, NOW, -1, -1, 21 17:07:00 CDT  Estimated Glomerular Filtration Rate: STAT collect, 21 17:14:00 CDT, Blood, Collected, Stop date 21 17:14:00 CDT, Lab Collect, Print Label By Order Location, 21 17:07:00 CDT  Lipase Level: STAT collect, 21 17:14:24 CDT, BLOOD, Collected, Stop date 21 17:14:00 CDT, Lab Collect  POC BNP iSTAT: Blood, Stat collect, Collected, 21 17:44:58 CDT  POC Istat ER Troponin: Blood, Stat collect, Collected, 21 17:45:42 CDT  Troponin-I: STAT collect, 21 17:14:24 CDT, BLOOD, Collected, Stop date 21 17:14:00 CDT, Lab Collect.   Results review:  Lab results : Lab View   2021 17:45 CDT      POC Troponin              0.02 ng/mL    2021 17:44 CDT      POC BNP iSTAT             >5000 pg/mL  HI    2021 17:14 CDT      Sodium Lvl                136 mmol/L                             Potassium Lvl             4.1 mmol/L                             Chloride                  100 mmol/L                             CO2                       19 mmol/L  LOW                             Calcium Lvl               9.4 mg/dL                             Glucose Lvl               149 mg/dL  HI                             BUN                       32.8 mg/dL  HI                             Creatinine                2.30 mg/dL  HI                             eGFR-AA                   42  NA                             eGFR-MARY                  34 mL/min/1.73 m2  NA                             Bili  Total                2.4 mg/dL  HI                             Bili Direct               1.4 mg/dL  HI                             Bili Indirect             1.00 mg/dL  HI                             AST                       48 unit/L  HI                             ALT                       24 unit/L                             Alk Phos                  77 unit/L                             Total Protein             6.9 gm/dL                             Albumin Lvl               3.7 gm/dL                             Globulin                  3.2 gm/dL                             A/G Ratio                 1.2 ratio                             Lipase Lvl                16 U/L                             Troponin-I                0.059 ng/mL  HI                             WBC                       7.6 x10(3)/mcL                             RBC                       4.03 x10(6)/mcL  LOW                             Hgb                       11.7 gm/dL  LOW                             Hct                       36.6 %  LOW                             Platelet                  260 x10(3)/mcL                             MCV                       90.8 fL                             MCH                       29.0 pg                             MCHC                      32.0 gm/dL  LOW                             RDW                       18.3 %  HI                             MPV                       10.8 fL                             Abs Neut                  5.71 x10(3)/mcL                             Neutro Auto               75 %  NA                             Lymph Auto                17 %  NA                             Mono Auto                 8 %  NA                             Eos Auto                  0 %  NA                             Abs Eos                   0.0 x10(3)/mcL                             Basophil Auto             0 %  NA                             Abs Neutro                5.71 x10(3)/mcL                              Abs Lymph                 1.3 x10(3)/mcL                             Abs Mono                  0.6 x10(3)/mcL                             Abs Baso                  0.0 x10(3)/mcL  .      Impression and Plan   Diagnosis   CHF exacerbation (ZDB38-IE I50.9)   Acute epigastric pain (BSE22-GI R10.13)   Abdominal pain (PNED [id number])      Calls-Consults   -  Digna Veliz.   Plan   Condition: Improved, Stable.    Disposition: Place in Observation Telemetry Unit.    Counseled: Patient, Regarding diagnosis, Regarding diagnostic results, Regarding treatment plan, Patient indicated understanding of instructions.    Notes:   I, Farhad Murguia, acted solely as a scribe for and in the presence of Dr. Kahn who performed the service., I, Dr Kahn have read note from scribe and I agree with history and physical except as amended by me.  All information was dictated from my history and my examination of patient..

## 2022-05-03 ENCOUNTER — TELEPHONE (OUTPATIENT)
Dept: TRANSPLANT | Facility: CLINIC | Age: 37
End: 2022-05-03
Payer: MEDICAID

## 2022-05-03 NOTE — HISTORICAL [facility]ER
This is a historical note converted from [place]. Formatting and pictures may have been removed.  Please reference [place] for original formatting and attached multimedia. Chief Complaint  pt reports pov c/o upper abd pain, double vision, and n/v x a while. pt also reports fluid in abd with SOB. hx CHF. seen 2 days ago, given meds with no relief.  History of Present Illness  Mr. Queen is a 37 yo AAM with pmhx??NICMO HFrEF?10 to 15%?on LifeVest, CKD Stage III, cocaine and?marijuana use,?6?hospitalizations?within the past month with similar presentation of abdominal pain/SOB. In fact, he was just discharged from our service yesterday 9/25 after being admitted for decompensated HF that improved with diuretics. He has chronic abdominal pain that has been worked up by GI and had a recent EGD on 9/16 that revealed edematous congestive mucosa with ulceration epigastric antrum, biopsy negative for gastritis or H. Pylori. He states he has been taking his furosemide as prescribed and even took an extra dose today but still was having SOB so he presented to the ED for evaluation, where is workup revealed a POC BNP >500o (its always high), CXR mild congestive changes. He was not hypoxia but due to his labored breathing the patient is being admitted to the Hospitalist Service for observation.  ?  ?   Pt seen and examined, he?sitting on the side of the bed playing on his XBOX?in no respiratory distress at all, O2 98% RA. Explained to him that we will give him 80mg IV Lasix tonight and he can be discharged home tomorrow. explained?to him that he will not receive pain medication for?his chronic abdominal pain. He voiced understanding.??He reports THC last night., denies cocaine use  ?  Review of Systems  All review of systems?obtained and negative except as stated above  Physical Exam  Vitals & Measurements  T:?36.5? ?C (Oral)? HR:?88(Peripheral)? HR:?88(Monitored)? RR:?13? BP:?111/78? SpO2:?100%?  General:?Awake, alert, oriented in  no acute distress  HEENT:?Normocephalic atraumatic,?pupils equal and reactive  Cardiovascular: Regular rate and rhythm, normal peripheral perfusion  Respiratory: rhonchi RLL,?no tachypnea or accessory muscle use  Abdomen:?Soft, nontender, nondistended?with positive bowel sounds  Extremities: Warm and well perfused, no clubbing or cyanosis  Neuro:?Grossly without focal deficits  Skin:?Warm, dry and intact  Psych:?Cooperative  ?  Assessment/Plan  Compensated Systolic HF (LVEF 15%) on LifeVest  ROC on CKD Stage III  Chronic Abdominal Pain  THC Use Disorder  Essential HTN  ?  PLAN:  - Lasix 80mg IVP NOW then continue 40mg IVP BID, will switch to oral upon discharge  - Resume home meds. Check UDS.  - Bentyl PRN abdominal pain  - spent 5 min on counseling on THC cessation, pt receptive but doubt he will change his ways.  - Plan for discharge home tomorrow.  ?  I, Digna Moreno, FNP-C have reviewed and discussed this case with Dr. De La Cruz  ?  DVT prophylaxis: Lovenox  CODE STATUS: Full  PCP:? Non-Staff  ?---------------------  Saúl De La Cruz MD  I personally performed a face-to-face evaluation of this patient?on [9/26/2021].?I?have reviewed?and agree?with the?nurse practitioners?history, physical exam and plan of care.  ?   Recurrent multiple admissions?this month?with the same reason?of abdominal pain,?nausea?and shortness of breath. ?Clinically does not appear in a decompensated heart failure. ?Chronic abdominal pain likely?due to combination of?cannabis hyperemesis as well as?CHF related mesenteric congestion. ?We will give him Lasix 80 mg IV?x1, limit narcotics,?heart failure education?especially diuretic use?according to weight, and?discharge home in a.m.?currently appear comfortable and playing on his N[employer].   Problem List/Past Medical History  NICM/Chronic HFrEF-?10-15% (ECHO 8/31/2021)  THC Use Disorder  Hx Cocaine Use  CKD Stage III  Procedure/Surgical History  Biopsy Gastrointestinal  (09/16/2021)  Esophagogastroduodenoscopy (09/16/2021)  Excision of Stomach, Pylorus, Via Natural or Artificial Opening Endoscopic, Diagnostic (09/16/2021)  HEART TRANSPLANT LABS (EXTERNAL) (12/04/2020)  Basic metabolic panel This panel must include the following: Calcium (38292) Carbon dioxide (67910) Chloride (12777) Creatinine (27630) Glucose (68259) Potassium (84459) Sodium (97607) Urea nitrogen (BUN) (19010) (12/01/2020)  Natriuretic peptide (12/01/2020)  Basic metabolic panel This panel must include the following: Calcium (86032) Carbon dioxide (63541) Chloride (48695) Creatinine (44299) Glucose (87367) Potassium (09376) Sodium (81037) Urea nitrogen (BUN) (42069) (11/05/2020)  Magnesium (11/05/2020)  Natriuretic peptide (11/05/2020)  CBC W Auto Diff Bld (10/30/2020)  Comprehensive metabolic panel This panel must include the following: Albumin (91104) Bilirubin, total (37007) Calcium (46945) Carbon dioxide (bicarbonate) (80128) Chloride (45406) Creatinine (62535) Glucose (56494) Phosphatase, alkaline (57950) Potassium (10/30/2020)  Magnesium (10/30/2020)  Phosphorus inorganic (phosphate); (10/30/2020)  CBC W Auto Diff Bld (10/29/2020)  Comprehensive metabolic panel This panel must include the following: Albumin (50735) Bilirubin, total (95128) Calcium (81030) Carbon dioxide (bicarbonate) (86675) Chloride (42669) Creatinine (15697) Glucose (78052) Phosphatase, alkaline (06805) Potassium (10/29/2020)  Magnesium (10/29/2020)  Phosphorus inorganic (phosphate); (10/29/2020)  CBC W Auto Diff Bld (10/28/2020)  Comprehensive metabolic panel This panel must include the following: Albumin (17189) Bilirubin, total (63599) Calcium (01555) Carbon dioxide (bicarbonate) (16794) Chloride (83338) Creatinine (86574) Glucose (40660) Phosphatase, alkaline (60332) Potassium (10/28/2020)  Magnesium (10/28/2020)  Phosphorus inorganic (phosphate); (10/28/2020)  Acute hepatitis panel This panel must include the following: Hepatitis A  antibody (HAAb), IgM antibody (97691) Hepatitis B core antibody (HBcAb), IgM antibody (70833) Hepatitis B surface antigen (HBsAg) (43014) Hepatitis C antibody (41323) (10/27/2020)  Antibody; HIV-1 and HIV-2, single assay (10/27/2020)  CBC W Auto Diff Bld (10/27/2020)  Comprehensive metabolic panel This panel must include the following: Albumin (45691) Bilirubin, total (35160) Calcium (97777) Carbon dioxide (bicarbonate) (13569) Chloride (51144) Creatinine (94217) Glucose (96166) Phosphatase, alkaline (60784) Potassium (10/27/2020)  Ferritin (10/27/2020)  Iron (10/27/2020)  Magnesium (10/27/2020)  Phosphorus inorganic (phosphate); (10/27/2020)  Thiamine (Vitamin B-1) (10/27/2020)  Thyroid stimulating hormone (TSH) (10/27/2020)  Thyroxine; free (10/27/2020)  CBC W Auto Diff Bld (10/26/2020)  Comprehensive metabolic panel This panel must include the following: Albumin (91098) Bilirubin, total (80383) Calcium (12177) Carbon dioxide (bicarbonate) (89374) Chloride (21734) Creatinine (34155) Glucose (23314) Phosphatase, alkaline (88346) Potassium (10/26/2020)  Cyanocobalamin (Vitamin B-12); (10/26/2020)  Folic acid; serum (10/26/2020)  HC TTE W OR WO FOL WCON,DOPPLERTRANSTHORACIC ECHO (TTE) COMPLETE W/ CONTRAST (10/26/2020)  Acetone or other ketone bodies, serum; quantitative (10/26/2020)  Lactate (lactic acid) (10/26/2020)  Lipase (10/26/2020)  Magnesium (10/26/2020)  Natriuretic peptide (10/26/2020)  Phosphorus inorganic (phosphate); (10/26/2020)  Pyridoxal phosphate (Vitamin B-6) (10/26/2020)  Radiologic examination, chest; single view (10/26/2020)  SARS-CoV-2 RdRp Resp Ql MARY+probe (10/26/2020)  Troponin, quantitative (10/26/2020)  Electrocardiogram, routine ECG with at least 12 leads; tracing only, without interpretation and report (10/25/2020)  Fluoroscopy of Left Heart using Low Osmolar Contrast (10/21/2020)  Fluoroscopy of Multiple Coronary Arteries using Low Osmolar Contrast (10/21/2020)  Fluoroscopy of Right  Lower Extremity Arteries using Low Osmolar Contrast (10/21/2020)  Fluoroscopy of Right Upper Extremity Arteries using Low Osmolar Contrast (10/21/2020)  Measurement of Cardiac Sampling and Pressure, Right Heart, Percutaneous Approach (10/21/2020)  Radiologic examination, chest; single view (10/15/2020)   Medications  Inpatient  No active inpatient medications  Home  albuterol 90 mcg/inh inhalation aerosol, 2 puff(s), INH, q4hr, PRN  capsaicin 0.075% topical cream, 1 skinny, TOP, TID  Carafate 1 g oral tablet, 1 gm= 1 tab(s), Oral, AC & HS  Colace 100 mg oral capsule, 100 mg= 1 cap(s), Oral, q12hr  Entresto 49 mg-51 mg oral tablet, 1 tab(s), Oral, BID  ferrous sulfate 325 mg (65 mg elemental iron) oral delayed release tablet, 325 mg= 1 tab(s), Oral, Daily  lactulose 10 g/15 mL oral syrup, See Instructions  Lasix 40 mg oral tablet, 40 mg= 1 tab(s), Oral, Daily  metoprolol succinate 50 mg oral tablet, extended release, 50 mg= 1 tab(s), Oral, Daily, 1 refills  MiraLax, 17 gm, Oral, Daily  Pantoprazole 40 mg ORAL EC-Tablet, 40 mg= 1 tab(s), Oral, Daily  Allergies  Bumex?(Hives)  Lactose?(Abdominal distension, gaseous)  Social History  Abuse/Neglect  No, 09/26/2021  No, 09/23/2021  No, No, Yes, 09/22/2021  Alcohol  Never, 09/23/2021  Past, Alcohol use interferes with work or home: No. Others hurt by drinking: No. Household alcohol concerns: No., 08/22/2021  Employment/School  Unemployed, Disabled, 01/07/2021  Exercise  Exercise frequency: Daily. Exercise type: Walking., 01/07/2021  Financial/Legal Situation  Low income, None, 06/21/2021  Home/Environment  Lives with Spouse. Living situation: Home/Independent., 01/07/2021    Never in , 01/07/2021  Nutrition/Health  Regular, Low cholesterol, Low fat, Low sodium, Good, 01/07/2021  Sexual  Sexually active: Yes. Number of current partners 1. Sexual orientation: Straight or heterosexual. Gender Identity Identifies as male., 01/07/2021  Spiritual/Cultural  Non  denomination, 01/07/2021  Substance Use  Current, Marijuana, Several times per day, 09/23/2021  Current, Marijuana, Daily, Drug use interferes with work/home: No. Household substance abuse concerns: No., 09/22/2021  Tobacco  Former smoker, quit more than 30 days ago, No, 09/26/2021  Former smoker, quit more than 30 days ago, No, 09/23/2021  Former smoker, quit more than 30 days ago, N/A, 09/22/2021  Family History  Crohns disease.: Brother.  Diabetes mellitus type 2: Mother and Father.  Heart failure.: Father.  Heart murmur.: Mother and Brother.  Hypertension.: Mother, Father and Brother.  Immunizations  Vaccine Date Status   influenza virus vaccine, inactivated 09/24/2021 Given   influenza virus vaccine, inactivated 10/16/2020 Given   poliovirus vaccine, live, trivalent 06/01/1987 Recorded   measles/mumps/rubella virus vaccine 06/01/1987 Recorded   poliovirus vaccine, live, trivalent 1985 Recorded   poliovirus vaccine, live, trivalent 1985 Recorded   Lab Results  Labs Last 24 Hours?  ?Chemistry? Hematology/Coagulation?   Sodium Lvl: 136 mmol/L (09/26/21 17:14:00) WBC: 7.6 x10(3)/mcL (09/26/21 17:14:00)   Potassium Lvl: 4.1 mmol/L (09/26/21 17:14:00) RBC:?4.03 x10(6)/mcL?Low (09/26/21 17:14:00)   Chloride: 100 mmol/L (09/26/21 17:14:00) Hgb:?11.7 gm/dL?Low (09/26/21 17:14:00)   CO2:?19 mmol/L?Low (09/26/21 17:14:00) Hct:?36.6 %?Low (09/26/21 17:14:00)   Calcium Lvl: 9.4 mg/dL (09/26/21 17:14:00) Platelet: 260 x10(3)/mcL (09/26/21 17:14:00)   Glucose Lvl:?149 mg/dL?High (09/26/21 17:14:00) MCV: 90.8 fL (09/26/21 17:14:00)   BUN:?32.8 mg/dL?High (09/26/21 17:14:00) MCH: 29 pg (09/26/21 17:14:00)   Creatinine:?2.3 mg/dL?High (09/26/21 17:14:00) MCHC:?32 gm/dL?Low (09/26/21 17:14:00)   eGFR-AA: 42 (09/26/21 17:14:00) RDW:?18.3 %?High (09/26/21 17:14:00)   eGFR-MARY: 34 mL/min/1.73 m2 (09/26/21 17:14:00) MPV: 10.8 fL (09/26/21 17:14:00)   Bili Total:?2.4 mg/dL?High (09/26/21 17:14:00) Abs Neut: 5.71 x10(3)/mcL  (09/26/21 17:14:00)   Bili Direct:?1.4 mg/dL?High (09/26/21 17:14:00) Neutro Auto: 75 % (09/26/21 17:14:00)   Bili Indirect:?1 mg/dL?High (09/26/21 17:14:00) Lymph Auto: 17 % (09/26/21 17:14:00)   AST:?48 unit/L?High (09/26/21 17:14:00) Mono Auto: 8 % (09/26/21 17:14:00)   ALT: 24 unit/L (09/26/21 17:14:00) Eos Auto: 0 % (09/26/21 17:14:00)   Alk Phos: 77 unit/L (09/26/21 17:14:00) Abs Eos: 0 x10(3)/mcL (09/26/21 17:14:00)   Total Protein: 6.9 gm/dL (09/26/21 17:14:00) Basophil Auto: 0 % (09/26/21 17:14:00)   Albumin Lvl: 3.7 gm/dL (09/26/21 17:14:00) Abs Neutro: 5.71 x10(3)/mcL (09/26/21 17:14:00)   Globulin: 3.2 gm/dL (09/26/21 17:14:00) Abs Lymph: 1.3 x10(3)/mcL (09/26/21 17:14:00)   A/G Ratio: 1.2 ratio (09/26/21 17:14:00) Abs Mono: 0.6 x10(3)/mcL (09/26/21 17:14:00)   POC BNP iSTAT:?>5000?High (09/26/21 17:44:00) Abs Baso: 0 x10(3)/mcL (09/26/21 17:14:00)   Lipase Lvl: 16 U/L (09/26/21 17:14:00)    Troponin-I:?0.059 ng/mL?High (09/26/21 17:14:00)    POC Troponin: 0.02 ng/mL (09/26/21 17:45:00)

## 2022-05-03 NOTE — HISTORICAL [facility] C[facility]
This is a historical note converted from [place]. Formatting and pictures may have been removed.  Please reference [place] for original formatting and attached multimedia. Chief Complaint  F/U  History of Present Illness  2021: Here today for follow-up.  -Cardiology stopped pt Vistaril due to oversedation and fatigue c/o. Switched Bumex to Torsemide due to itching. States since starting Torsemide he has burning with ejaculation. He stopped taking it and restarted the Bumex he was on and the Vistaril that was prescribed for Insomnia/Itching.  -Intermittent Testicular pain on left testicle-denies pain on urination, denies abdominal pain or inguinal pain. No groin swelling reported.? +painful with sex. denies discharge. pain is more localized under scrotum. reports itching inside penis, testicles which all started when began taking Torsemide. Denies trouble urinating.  ?  2021:? Complaints today:? Here to establish care with new PCP. Followed by Cardiology. Hospitalized and d/c before Thanksgiving. +life vest. Requesting Cardiology referral.  -NICMO-Walking about 200 feet before getting SOB. Life vest present. has noticed improvement in his condition. States he was addicted to Ecstacy and was using it?a lot.? Wife states they would get 100 at a time and in no time she would go through 50 of them and so would he.? He overdosed on this, which is what led to his hospitalization.? His father also  from HF. He tells me that he is going to get into rehab for his addiction, he is not currently using but he needs to do this in order to get on a list for a heart transplant.  -Insomnia-states is fully able to sleep during the day but is not able to sleep at night.? He will stay awake all night with inability to fall asleep.? Was given medications while in hospital including Xanax and Ativan and nothing helped.?  -Rash to abdomen has been there for over 1 week.? Was previously given antifungal creams to treat but it  has returned to abdomen.? He also has broken out with numerous bumps to entire back and forehead that he noticed once he filled his Bumetadine pills.? He states that he had previously filled in [place] and was taking a white pill that was not causing skin eruptions but when he started taking this pink 2mg pill, he noticed the bumps appearing.? Mild itching reported.?  -Epigastric pain-takes Protonix only when he needs it.?  -Testicular pain on left testicle-denies pain on urination, denies abdominal pain or inguinal pain. No groin swelling reported.? painful with sex. denies discharge. pain is more localized under scrotum.  ?   Cancer Screening:  Colonoscopy: never done  ?   Vaccines:  Tetanus/Tdap: UTD  Flu: 2020  Pna: 2020  Review of Systems  General: negative except as stated in hpi  Skin: negative except as stated in hpi  HEENT: negative except as stated in hpi  Respiratory: negative except as stated in hpi  CV: negative except as stated in hpi  GI: negative except as stated in hpi  : negative except as stated in hpi  MS: negative except as stated in hpi  Neuro: negative except as stated in hpi  Hematologic: negative except as stated in hpi  Endocrine: negative except as stated in hpi  Psych: negative except as stated in hpi  Physical Exam  Vitals & Measurements  T:?36.7? ?C (Oral)? HR:?86(Peripheral)? RR:?20? BP:?100/66? SpO2:?100%?  HT:?190.00?cm? WT:?75.000?kg? BMI:?20.78?  General: AAOx3; NADN.  Head: NC/AT  Eye: PERRLA;?EOMI; Sclera/conjunctiva non-icteric. No nystagmus, lid lag, exophthalmos.  Neck/Thyroid: Supple, Non-tender, No carotid bruit, No lymphadenopathy, FROM  Respiratory: Clear to auscultation bilaterally, Respirations non-labored, Breath sounds equal, Symmetrical chest wall expansion, No wheezes, rales or rhonchi.  Cardiovascular: Regular rate and rhythm, No murmurs, rubs or gallops. No peripheral edema.  Gastrointestinal: Soft, Non-tender, Non-distended, Normal bowel sounds. No  organomegaly.  Musculoskeletal: Normal range of motion.? No clubbing, cyanosis or edema. FROM. Strength upper and lower ext 5/5.?  Integumentary: Warm, Dry, Intact, No suspicious lesions or rashes.?  Neurologic: No focal deficits,?Motor strength normal upper and lower extremities, Sensory exam intact.  Lymph: no palpable nodes in groin  Assessment/Plan  1.?Pain with ejaculation?N53.12,?Pain with ejaculation?N53.12  Urine chlamydia, gonorrhea, trich, HIV, Syphilis, Hepatitis  UA with micro: LE+, nitrite+, blood+, wbc 51-99; culture pending  will treat for UTI once culture back with sensitivity  PSA=6.89  Will consider referral to urology once tx complete  Repeat PSA in?3 weeks  Ordered:  PATRICK Dolaurie, Trich vag [id number], Now collect, Urine, 04/12/21 15:19:00 CDT by CLIENT, Once, Stop date 04/12/21 15:19:00 CDT, Nurse collect, Micro Spec, Painful ejaculation, ~INHERIT, 04/12/21 14:45:00 CDT  Office/Outpatient Visit Level 4 Established 93490 PC, Anemia of chronic disease  Tobacco user  Nonischemic congestive cardiomyopathy  Stage 3 chronic kidney disease  Follow-up exam  Pain with ejaculation, [facility], 04/12/21 15:00:00 CDT  Prostate Specific Antigen, Routine collect, *Est. 05/04/21 3:00:00 CDT, Blood, Order for future visit, *Est. Stop date 05/04/21 3:00:00 CDT, Lab Collect, Pain with ejaculation, 04/13/21 9:35:00 CDT  Routine Spec Collect Venipuncture - Lab blood collect, 04/12/21 15:42:00 CDT, [facility], Routine, 04/12/21 15:42:00 CDT, Anemia of chronic disease  Tobacco user  Nonischemic congestive cardiomyopathy  Stage 3 chronic kidney disease  Follow-up exam  Pain with ejaculation  Urine Culture [id number], Stat collect, 04/12/21 15:31:00 CDT, Urine, Collected, Nurse collect, [id number], Stop date 04/12/21 15:31:00 CDT, Pain with ejaculation  ?  2.?Anemia of chronic disease?D64.9  Hgb 12 last blood draw. FLP ordered, not done yet  Pt prescribed iron by [facility] when  hospitalized.? instr to start taking it and inc iron in diet  Report any SOB symptoms, fatigue, CP  Ordered:  Office/Outpatient Visit Level 4 Established 95976 PC, Anemia of chronic disease  Tobacco user  Nonischemic congestive cardiomyopathy  Stage 3 chronic kidney disease  Follow-up exam  Pain with ejaculation, [facility], 04/12/21 15:00:00 CDT  Routine Spec Collect Venipuncture - Lab blood collect, 04/12/21 15:42:00 CDT, [facility], Routine, 04/12/21 15:42:00 CDT, Anemia of chronic disease  Tobacco user  Nonischemic congestive cardiomyopathy  Stage 3 chronic kidney disease  Follow-up exam  Pain with ejaculation  ?  3.?Tobacco user?Z72.0  Smoking cessation discussed.?  Pt not ready to quit.  Discussed benefits of quitting including improved health, decreased cardiac/vascular/pulmonary/stroke risks as well as saving money.  Ordered:  Office/Outpatient Visit Level 4 Established 81118 PC, Anemia of chronic disease  Tobacco user  Nonischemic congestive cardiomyopathy  Stage 3 chronic kidney disease  Follow-up exam  Pain with ejaculation, [facility], 04/12/21 15:00:00 CDT  Routine Spec Collect Venipuncture - Lab blood collect, 04/12/21 15:42:00 CDT, [facility], Routine, 04/12/21 15:42:00 CDT, Anemia of chronic disease  Tobacco user  Nonischemic congestive cardiomyopathy  Stage 3 chronic kidney disease  Follow-up exam  Pain with ejaculation  ?  4.?Nonischemic congestive cardiomyopathy?I42.0  Continue with Cardiology appointments  pt stopped Torsemide due to painful ejaculation issues and restarted himself on bumex and hydroxyzine for itching/rash  Ordered:  Office/Outpatient Visit Level 4 Established 87412 PC, Anemia of chronic disease  Tobacco user  Nonischemic congestive cardiomyopathy  Stage 3 chronic kidney disease  Follow-up exam  Pain with ejaculation, [facility], 04/12/21 15:00:00 CDT  Routine Spec Collect Venipuncture - Lab blood collect, 04/12/21 15:42:00 CDT, [facility]  C, Routine, 04/12/21 15:42:00 CDT, Anemia of chronic disease  Tobacco user  Nonischemic congestive cardiomyopathy  Stage 3 chronic kidney disease  Follow-up exam  Pain with ejaculation  ?  5.?Stage 3 chronic kidney disease?N18.30  Referral placed to nephrology, GFR 53, BUN/CR elevation; HgbA1c 6.8  GFR 53  Follow renoprotective measures including Renal Diet (reduce intake of nuts, peanut butter, milk, cheese, dried beans, peas), low protein,?and Low Sodium Diet (less than 2 grams per day).  Avoid NSAIDs (Aleve, Mobic, Celebrex, Ibuprofen, Advil, Toradol and Diclofenac). May take Tylenol as needed for headache/pain.  Control DM with goal A1C <7. BP goal <130/80. LDL goal < 100.  Stay well hydrated. Avoid alcohol and soda. Limit tea and coffee.  Smoking Cessation recommended.  Ordered:  Office/Outpatient Visit Level 4 Established 75635 PC, Anemia of chronic disease  Tobacco user  Nonischemic congestive cardiomyopathy  Stage 3 chronic kidney disease  Follow-up exam  Pain with ejaculation, [facility], 04/12/21 15:00:00 CDT  Routine Spec Collect Venipuncture - Lab blood collect, 04/12/21 15:42:00 CDT, [facility], Routine, 04/12/21 15:42:00 CDT, Anemia of chronic disease  Tobacco user  Nonischemic congestive cardiomyopathy  Stage 3 chronic kidney disease  Follow-up exam  Pain with ejaculation  ?  6.?Cannabis dependence?F12.20  Stop using marijuana  ?  Orders:  1036F Current tobacco non-user, 04/12/21 14:47:00 CDT  1126F Pain severity quantified, no pain present, 04/12/21 14:47:00 CDT  1160F- Medication reconciliation completed during visit, Follow-up exam, [facility], 04/12/21 14:48:00 CDT  3008F Body Mass Index (BMI), documented, 04/12/21 14:47:00 CDT  3074F Most recent systolic blood pressure, less than 130 mm Hg, 04/12/21 14:47:00 CDT  3078F Most recent diastolic blood pressure, less than 80 mm Hg, 04/12/21 14:47:00 CDT  3725F Screening for depression performed, 04/12/21 14:47:00 CDT  Clinic  Follow up, *Est. 07/12/21 14:20:00 CDT, Order for future visit, Follow-up exam, [facility]  US Scrotum, Routine, *Est. 04/12/21 3:00:00 CDT, Testicular Pain, Pain to left testicle, None, Ambulatory, Rad Type, Order for future visit, Testicular pain, left, Schedule this test, [facility], *Est. 04/12/21 3:00:00 CDT  Referrals  [facility] Internal Referral to Nephrology Clinic, Specialty: Nephrology, Start: 04/12/21 15:19:00 CDT  Clinic Follow up, *Est. 07/12/21 14:20:00 CDT, Order for future visit, Follow-up exam, [facility]   Problem List/Past Medical History  Ongoing  Anemia of chronic disease  Cannabis dependence  Nonischemic congestive cardiomyopathy  Tobacco user  Historical  No qualifying data  Procedure/Surgical History  HEART TRANSPLANT LABS (EXTERNAL) (12/04/2020)  Basic metabolic panel This panel must include the following: Calcium (09768) Carbon dioxide (86176) Chloride (08975) Creatinine (57964) Glucose (08899) Potassium (40750) Sodium (98758) Urea nitrogen (BUN) (83691) (12/01/2020)  Natriuretic peptide (12/01/2020)  Basic metabolic panel This panel must include the following: Calcium (63665) Carbon dioxide (00509) Chloride (15546) Creatinine (47781) Glucose (08026) Potassium (39150) Sodium (99053) Urea nitrogen (BUN) (33395) (11/05/2020)  Magnesium (11/05/2020)  Natriuretic peptide (11/05/2020)  CBC W Auto Diff Bld (10/30/2020)  Comprehensive metabolic panel This panel must include the following: Albumin (79900) Bilirubin, total (95476) Calcium (80481) Carbon dioxide (bicarbonate) (42158) Chloride (71538) Creatinine (15875) Glucose (80744) Phosphatase, alkaline (39138) Potassium (10/30/2020)  Magnesium (10/30/2020)  Phosphorus inorganic (phosphate); (10/30/2020)  CBC W Auto Diff Bld (10/29/2020)  Comprehensive metabolic panel This panel must include the following: Albumin (22142) Bilirubin, total (64634) Calcium (81277) Carbon dioxide (bicarbonate) (24413)  Chloride (53629) Creatinine (85966) Glucose (40716) Phosphatase, alkaline (26586) Potassium (10/29/2020)  Magnesium (10/29/2020)  Phosphorus inorganic (phosphate); (10/29/2020)  CBC W Auto Diff Bld (10/28/2020)  Comprehensive metabolic panel This panel must include the following: Albumin (05988) Bilirubin, total (63177) Calcium (94828) Carbon dioxide (bicarbonate) (44997) Chloride (65593) Creatinine (55631) Glucose (42337) Phosphatase, alkaline (28310) Potassium (10/28/2020)  Magnesium (10/28/2020)  Phosphorus inorganic (phosphate); (10/28/2020)  Acute hepatitis panel This panel must include the following: Hepatitis A antibody (HAAb), IgM antibody (47687) Hepatitis B core antibody (HBcAb), IgM antibody (91163) Hepatitis B surface antigen (HBsAg) (89793) Hepatitis C antibody (44628) (10/27/2020)  Antibody; HIV-1 and HIV-2, single assay (10/27/2020)  CBC W Auto Diff Bld (10/27/2020)  Comprehensive metabolic panel This panel must include the following: Albumin (81270) Bilirubin, total (54288) Calcium (62321) Carbon dioxide (bicarbonate) (75385) Chloride (23606) Creatinine (22937) Glucose (28801) Phosphatase, alkaline (60910) Potassium (10/27/2020)  Ferritin (10/27/2020)  Iron (10/27/2020)  Magnesium (10/27/2020)  Phosphorus inorganic (phosphate); (10/27/2020)  Thiamine (Vitamin B-1) (10/27/2020)  Thyroid stimulating hormone (TSH) (10/27/2020)  Thyroxine; free (10/27/2020)  CBC W Auto Diff Bld (10/26/2020)  Comprehensive metabolic panel This panel must include the following: Albumin (59553) Bilirubin, total (74727) Calcium (82074) Carbon dioxide (bicarbonate) (09945) Chloride (82109) Creatinine (15933) Glucose (16592) Phosphatase, alkaline (44476) Potassium (10/26/2020)  Cyanocobalamin (Vitamin B-12); (10/26/2020)  Folic acid; serum (10/26/2020)  HC TTE W OR WO FOL WCON,DOPPLERTRANSTHORACIC ECHO (TTE) COMPLETE W/ CONTRAST (10/26/2020)  Acetone or other ketone bodies, serum; quantitative (10/26/2020)  Lactate (lactic  acid) (10/26/2020)  Lipase (10/26/2020)  Magnesium (10/26/2020)  Natriuretic peptide (10/26/2020)  Phosphorus inorganic (phosphate); (10/26/2020)  Pyridoxal phosphate (Vitamin B-6) (10/26/2020)  Radiologic examination, chest; single view (10/26/2020)  SARS-CoV-2 RdRp Resp Ql MARY+probe (10/26/2020)  Troponin, quantitative (10/26/2020)  Electrocardiogram, routine ECG with at least 12 leads; tracing only, without interpretation and report (10/25/2020)  Fluoroscopy of Left Heart using Low Osmolar Contrast (10/21/2020)  Fluoroscopy of Multiple Coronary Arteries using Low Osmolar Contrast (10/21/2020)  Fluoroscopy of Right Lower Extremity Arteries using Low Osmolar Contrast (10/21/2020)  Fluoroscopy of Right Upper Extremity Arteries using Low Osmolar Contrast (10/21/2020)  Measurement of Cardiac Sampling and Pressure, Right Heart, Percutaneous Approach (10/21/2020)  Radiologic examination, chest; single view (10/15/2020)   Medications  albuterol 90 mcg/inh inhalation aerosol, 2 puff(s), INH, q4hr, PRN  ferrous sulfate 325 mg (65 mg elemental iron) oral delayed release tablet, 325 mg= 1 tab(s), Oral, Daily  metoprolol succinate 25 mg oral capsule, extended release, 25 mg= 1 cap(s), Oral, Daily, 3 refills  sacubitril-valsartan 49 mg-51 mg oral tablet, 1 tab(s), Oral, BID, 3 refills  Allergies  Lactose?(Abdominal distension, gaseous)  Social History  Abuse/Neglect  No, No, Yes, 04/12/2021  Alcohol  Never, 08/10/2017  Employment/School  Unemployed, Disabled, 01/07/2021  Exercise  Exercise frequency: Daily. Exercise type: Walking., 01/07/2021  Home/Environment  Lives with Spouse. Living situation: Home/Independent., 01/07/2021    Never in , 01/07/2021  Nutrition/Health  Regular, Low cholesterol, Low fat, Low sodium, Good, 01/07/2021  Sexual  Sexually active: Yes. Number of current partners 1. Sexual orientation: Straight or heterosexual. Gender Identity Identifies as male., 01/07/2021  Spiritual/Cultural  Non  denomination, 01/07/2021  Substance Use  Current, Marijuana, 01/09/2020  Never, 08/10/2017  Tobacco  Never (less than 100 in lifetime), N/A, 04/12/2021  Family History  Crohns disease.: Brother.  Diabetes mellitus type 2: Mother and Father.  Heart failure.: Father.  Heart murmur.: Mother and Brother.  Hypertension.: Mother, Father and Brother.  Immunizations  Vaccine Date Status   influenza virus vaccine, inactivated 10/16/2020 Given   Health Maintenance  Health Maintenance  ???Pending?(in the next year)  ??? ??Due?  ??? ? ? ?COPD Maintenance-Spirometry due??04/13/21??Unknown Frequency  ??? ??Postponed?  ??? ? ? ?Tetanus Vaccine due??07/06/21??and every 10??year(s)  ??? ??Refused?  ??? ? ? ?Smoking Cessation due??01/01/21??and every 1??year(s)  ??? ??Due In Future?  ??? ? ? ?Influenza Vaccine not due until??10/01/21??and every 1??day(s)  ??? ? ? ?Obesity Screening not due until??01/01/22??and every 1??year(s)  ??? ? ? ?Alcohol Misuse Screening not due until??01/02/22??and every 1??year(s)  ??? ? ? ?ADL Screening not due until??01/07/22??and every 1??year(s)  ??? ? ? ?Blood Pressure Screening not due until??04/12/22??and every 1??year(s)  ??? ? ? ?Body Mass Index Check not due until??04/12/22??and every 1??year(s)  ??? ? ? ?Depression Screening not due until??04/12/22??and every 1??year(s)  ???Satisfied?(in the past 1 year)  ??? ??Satisfied?  ??? ? ? ?ADL Screening on??01/07/21.??Satisfied by Krystle Butler LPN  ??? ? ? ?Alcohol Misuse Screening on??01/07/21.??Satisfied by Krystle Butler LPN  ??? ? ? ?Blood Pressure Screening on??04/12/21.??Satisfied by Krystle Butler LPN  ??? ? ? ?Body Mass Index Check on??04/12/21.??Satisfied by Krystle Butler LPN  ??? ? ? ?COPD Maintenance-Spirometry on??01/07/21.??Satisfied by Rosio Devlin  ??? ? ? ?Depression Screening on??04/12/21.??Satisfied by Krystle Butler LPN  ??? ? ? ?Influenza Vaccine on??01/07/21.??Satisfied by Krystle Butler LPN  ??? ? ? ?Lipid  Screening on??10/16/20.??Satisfied by Anjelica Harvey MT  ??? ? ? ?Obesity Screening on??04/12/21.??Satisfied by Krystle Butlre LPN  ??? ? ? ?Smoking Cessation on??11/17/20.??Satisfied by Nia Whipple RN  ??? ??Postponed?  ??? ? ? ?Tetanus Vaccine on??01/07/21.??Recorded by Krystle Butler LPN??Reason: Postpone due to refusal  ??? ??Refused?  ??? ? ? ?Smoking Cessation on??04/12/21.??Recorded by Krystle Butler LPN??Reason: Patient Refuses  ?

## 2022-05-03 NOTE — TELEPHONE ENCOUNTER
I called [facility] for lab results.  Pt has not been assigned; are looking into it and will call me back

## 2022-05-03 NOTE — HISTORICAL [facility] CERNER
This is a historical note converted from [place]. Formatting and pictures may have been removed.  Please reference [place] for original formatting and attached multimedia. Admit and Discharge Dates  Admit Date: 09/26/2021  Discharge Date: 09/27/2021  Physicians  Attending Physician - Holland ROSEN, Saul BHATT  Admitting Physician - Jono ROSEN, Saúl LOUIE  Primary Care Physician - Rosio Devlin  Discharge Diagnosis  Abdominal pain?[id number]  CHF exacerbation?I50.9  Chronic abdominal pain?R10.9  nicmo/acute on chronic ?Systolic HF (LVEF 15%) on LifeVest  ROC on CKD Stage III  Chronic Abdominal Pain  THC Use Disorder  Essential HTN  medical noncompliance  VHD - moderate to Severe MR/ Mod TR (Echo 8.31.21)  ?  CODE STATUS: Full  PCP:? Non-Staff  Admission Information  Mr. Queen is a 37 yo AAM with pmhx??NICMO HFrEF?10 to 15%?on LifeVest, CKD Stage III, cocaine and?marijuana use,?6?hospitalizations?within the past month with similar presentation of abdominal pain/SOB. In fact, he was just discharged from our service yesterday 9/25 after being admitted for decompensated HF that improved with diuretics. He has chronic abdominal pain that has been worked up by GI and had a recent EGD on 9/16 that revealed edematous congestive mucosa with ulceration epigastric antrum, biopsy negative for gastritis or H. Pylori. He states he has been taking his furosemide as prescribed and even took an extra dose today but still was having SOB so he presented to the ED for evaluation, where is workup revealed a POC BNP >500o , CXR mild congestive changes. He was no hypoxia but due to his labored breathing the patient is being admitted to the Hospitalist Service for observation.  ?  ?   Pt seen and examined, he is ?sitting on the side of the bed playing on his XBOX?in no respiratory distress at all, O2 98% RA. Explained to him that we will give him 80mg IV Lasix tonight and he can be discharged home tomorrow. explained?to him  that he will not receive pain medication for?his chronic abdominal pain. He voiced understanding.??He reports THC last night., denies cocaine use.  ?   Pt responded well to diuresis overnight.?All medications were?resumed.?? pt refers has appt w [facility] cardiology as well w cis given on previous admit. Agree with admitting team, appears well but BNP > 5000 not at baseline w mild congestive heart changes on chest x rays? and most likely mesenteric edema by egd and presentation ?. Given education about chf including knowing his dry wt/ daily wt /salt restriction/?needs to get more involve w tx and stop substance abuse. Not a candidate at this moment for heart transplant w ongoing substance abuse. refers taking  asa/entresto /metoprolol/aldactone/lasix. Pt appears improved from admission. Not in distress at all. will discharge w follow up w cardiology at [facility]/ cis .  Time Spent on discharge  Discharge summary greater than 35 minutes  Objective  Physical Exam  General:?Awake, alert, oriented in no acute distress  HEENT:?Normocephalic atraumatic,?pupils equal and reactive  Cardiovascular: Regular rate and rhythm, normal peripheral perfusion  Respiratory:?cta ,?no tachypnea or accessory muscle use  Abdomen:?Soft, nontender, nondistended?with positive bowel sounds  Extremities: Warm and well perfused, no clubbing or cyanosis  Neuro:?Grossly without focal deficits  Skin:?Warm, dry and intact  Psych:?Cooperative  Patient Discharge Condition  stable for discharge home  Discharge Disposition  Follow up with [facility] cardiology/already has appt   Discharge Medication Reconciliation  Prescribed  sacubitril-valsartan (Entresto 24 mg-26 mg oral tablet)?1 tab(s), Oral, BID  Continue  albuterol (albuterol 90 mcg/inh inhalation aerosol)?2 puff(s), INH, q4hr, PRN as needed for wheezing  aspirin (aspirin 81 mg oral tablet, CHEWABLE)?81 mg, Oral, Daily  capsaicin topical (capsaicin 0.075% topical cream)?1 skinny, TOP, TID  docusate (Colace 100 mg  oral capsule)?100 mg, Oral, q12hr  ferrous sulfate (ferrous sulfate 325 mg (65 mg elemental iron) oral delayed release tablet)?325 mg, Oral, Daily  furosemide (Lasix 80 mg oral tablet)?80 mg, Oral, BID  metoprolol (metoprolol succinate 25 mg oral capsule, extended release)?25 mg, Oral, Daily  mirtazapine (mirtazapine 15 mg oral tablet)?15 mg, Oral, Once a day (at bedtime)  pantoprazole (Pantoprazole 40 mg ORAL EC-Tablet)?40 mg, Oral, Daily  polyethylene glycol 3350 (MiraLax)?17 gm, Oral, Daily  sacubitril-valsartan (Entresto 49 mg-51 mg oral tablet)?1 tab(s), Oral, BID  spironolactone (Aldactone 25 mg oral tablet)?12.5 mg, Oral, Daily  sucralfate (Carafate 1 g oral tablet)?1 gm, Oral, AC & HS  Discontinue  losartan (losartan 25 mg oral tablet)?25 mg, Oral, Daily  Education and Orders Provided  Heart Failure Exacerbation  Discharge - 09/27/21 16:20:00 CDT, Home?  Follow up  Follow up with Cardiologist/cis at [facility]  Report to Emergency Department if symptoms return or worsen  Follow-up with PCP in 3 to 5 days  Follow up with primary care provider  [employer]  No Qualifying Data

## 2022-05-03 NOTE — HISTORICAL [facility] CERNER
This is a historical note converted from [facility]. Formatting and pictures may have been removed.  Please reference Ludin for original formatting and attached multimedia. Mr. Queen is a 36 Y M with PMH HFrEF 15%, CKD III, NSTEMI who presents with SOB progressing over the last week with significant worsening today. Hes been having increased frequency of soft stools and says he took several extra pills of his home diuretic without any urine output since the morning. Ussually drinks < 2L water but sometimes drinks more; and also yesterday he ate half of a large watermelon. Hasnt eaten much salt lately. Mentions occasional palpitations. No CP, diaphoresis, nausea. Has PND and also baseline orthopnea that hasnt worsened acutely. Overall?patient presenting with symptoms of CHF exacerbation but on physcial exam and CXR not showing signs of overload. Vitals dont warrant a CT PE. Patient has little elevated troponin; will start ASA, Plavix as SOB could be anginal equivalent. Keeping NPO. Will diureses patient with IV Lasix (he had adequate urine output in ED with IV diuresis. Also of note, patient mentoins red stool yesterday/today which he attributes to watermelon. Bedside guaiac test was borderline positive but not convincingly so. Given drop in Hg since last ED visit and epigastric tenderness,?ordered FOBT test, as acute drop in Hg could cause SOB. Also ordered lipase and pancreas US for epigastric pain.  ?  General - Appears comfortable, appropriately conversive  Mental Status - alert and oriented x 3, speaking in logical, relevant sentences  HEENT - No pre/post-auricular, anterior/posterior cervical, or supraclavicular?lymph nodes appreciated; no rhinorrhea  Cardiac - RRR, no murmurs, rubs, or gallops; no edema in LE  Respiratory - breathing comfortably; clear to ascultation bilaterally  Abdominal - nondistended,?soft, epigastric pain  Extremities - LE, UE, and joints are nonerythematous and non-swollen  Skin - no  rashes or bruises seen on skin  ?   Wilmer Leon PGY 2  ?   Faculty addendum:  I have reviewed the patients history, residents ?findings on physical examination, diagnosis and treatment plan. Care provided was reasonable and necessary.  ?  ?  Unable to addend history and physical?due to techincal?difficulties at this time, as it is still in use?by another provider  In any event, was unable to examine patient during a.m. rounds, as he eloped in the emergency room  ?? Was not seen

## 2022-05-04 NOTE — TELEPHONE ENCOUNTER
I have been calling  Agency, [facility], [facility] infusion [facility], and [facility] to get updates on this pt.  He has not had labs completed and evidently is not scheduled for same.  Is unable to have HH as insurance is not contracted with  agency.  Pharmacist at [facility] has been unable to reach the pt , and has been calling to arrange delivery of medication without success.      I called Mr Queen and was immediately disconnected.  I called again and spoke with his wife:  1) The pharmacist at [facility] has been trying to reach him since Friday to arrange delivery of medication  2) The  agency is unable to admit to  due to insurance restrictions.   3) Mr Queen needs to go to the outpt infusion center at [facility] to get weekly dressing changes and lab draws.   4) Call  and speak with INTAKE department to arrange for outpt draws and fill out paperwork for assistance if insurance will not pay for infusion suite.      Verbalized understanding of instructions.   I will let our SW team know of our concerns with this pt getting FU.

## 2022-05-05 ENCOUNTER — TELEPHONE (OUTPATIENT)
Dept: TRANSPLANT | Facility: CLINIC | Age: 37
End: 2022-05-05
Payer: MEDICAID

## 2022-05-05 NOTE — TELEPHONE ENCOUNTER
Received call from family member that pt will go to [email or web address] tomorrow for dressing change and bloodwork.  Primary coord notified.

## 2022-05-09 LAB
POCT GLUCOSE: 157 MG/DL (ref 70–110)
POCT GLUCOSE: 221 MG/DL (ref 70–110)

## 2022-05-11 ENCOUNTER — TELEPHONE (OUTPATIENT)
Dept: TRANSPLANT | Facility: CLINIC | Age: 37
End: 2022-05-11
Payer: MEDICAID

## 2022-05-11 LAB
EXT ALBUMIN: 4.6
EXT ALT: 42
EXT AST: 33
EXT BNP: 509
EXT BUN: 20
EXT CALCIUM: 9.7
EXT CHLORIDE: 100
EXT CREATININE: 1.61 MG/DL
EXT GLUCOSE: 174
EXT HEMATOCRIT: 40.3
EXT HEMOGLOBIN: 14.3
EXT MAGNESIUM: 1.8
EXT PLATELETS: 273
EXT POTASSIUM: 3.8
EXT PROTEIN TOTAL: 7.4
EXT SODIUM: 140 MMOL/L
EXT WBC: 8.2

## 2022-05-11 NOTE — TELEPHONE ENCOUNTER
Lab results reviewed and are WNL for this pt.  I spoke with his wife, Serene, who says he is feeling good and has no questions.  Reminded of upcoming appts.

## 2022-05-16 ENCOUNTER — TELEPHONE (OUTPATIENT)
Dept: TRANSPLANT | Facility: CLINIC | Age: 37
End: 2022-05-16
Payer: MEDICAID

## 2022-05-16 NOTE — TELEPHONE ENCOUNTER
I called to get labs sent over from last week.   Infusion company says their orders say every other week.  I resent the  orders and transferred to speak with the pharmacist.  Mr Thacker is next schedule Thursday at 130 pm.  I reiterated that we need labs q week. I gave Lara my fax and phone number.  Pt is scheduled to FU with Dr Yang on 5-24.

## 2022-05-20 ENCOUNTER — TELEPHONE (OUTPATIENT)
Dept: TRANSPLANT | Facility: CLINIC | Age: 37
End: 2022-05-20
Payer: MEDICAID

## 2022-05-20 ENCOUNTER — HOSPITAL ENCOUNTER (EMERGENCY)
Facility: HOSPITAL | Age: 37
Discharge: HOME | End: 2022-05-20
Attending: EMERGENCY MEDICINE
Payer: MEDICAID

## 2022-05-20 VITALS
HEART RATE: 82 BPM | WEIGHT: 210 LBS | HEIGHT: 75 IN | SYSTOLIC BLOOD PRESSURE: 106 MMHG | BODY MASS INDEX: 26.11 KG/M2 | DIASTOLIC BLOOD PRESSURE: 72 MMHG | OXYGEN SATURATION: 98 % | RESPIRATION RATE: 13 BRPM | TEMPERATURE: 99 F

## 2022-05-20 DIAGNOSIS — R73.9 HYPERGLYCEMIA: Primary | ICD-10-CM

## 2022-05-20 LAB
ALBUMIN SERPL-MCNC: 4.4 GM/DL (ref 3.5–5)
ALBUMIN/GLOB SERPL: 1.1 RATIO (ref 1.1–2)
ALP SERPL-CCNC: 103 UNIT/L (ref 40–150)
ALT SERPL-CCNC: 32 UNIT/L (ref 0–55)
APPEARANCE UR: CLEAR
AST SERPL-CCNC: 29 UNIT/L (ref 5–34)
BACTERIA #/AREA URNS AUTO: NORMAL /HPF
BASE EXCESS ARTERIAL: NORMAL
BASOPHILS # BLD AUTO: 0.03 X10(3)/MCL (ref 0–0.2)
BASOPHILS NFR BLD AUTO: 0.4 %
BILIRUB UR QL STRIP.AUTO: NEGATIVE MG/DL
BILIRUBIN DIRECT+TOT PNL SERPL-MCNC: 0.8 MG/DL
BUN SERPL-MCNC: 18.7 MG/DL (ref 8.9–20.6)
CALCIUM SERPL-MCNC: 10.7 MG/DL (ref 8.4–10.2)
CHLORIDE SERPL-SCNC: 92 MMOL/L (ref 98–107)
CO2 SERPL-SCNC: 28 MMOL/L (ref 22–29)
COLOR UR AUTO: YELLOW
CORRECTED TEMPERATURE (PCO2): 44 MMHG (ref 35–45)
CORRECTED TEMPERATURE (PH): 7.39 (ref 7.35–7.45)
CORRECTED TEMPERATURE (PO2): 92 MMHG (ref 80–100)
CREAT SERPL-MCNC: 2.02 MG/DL (ref 0.73–1.18)
EOSINOPHIL # BLD AUTO: 0.11 X10(3)/MCL (ref 0–0.9)
EOSINOPHIL NFR BLD AUTO: 1.5 %
ERYTHROCYTE [DISTWIDTH] IN BLOOD BY AUTOMATED COUNT: 13.2 % (ref 11.5–17)
EST. AVERAGE GLUCOSE BLD GHB EST-MCNC: 217.3 MG/DL
EXT ALBUMIN: 4.6
EXT ALT: 30
EXT AST: 28
EXT BUN: 21
EXT CALCIUM: 9.8
EXT CHLORIDE: 92
EXT CREATININE: 1.74 MG/DL
EXT GLUCOSE: 639
EXT HEMATOCRIT: 41
EXT HEMOGLOBIN: 13.5
EXT MAGNESIUM: 2.1
EXT PLATELETS: 253
EXT POTASSIUM: 4.2
EXT PROTEIN TOTAL: 7.7
EXT SODIUM: 133 MMOL/L
EXT WBC: 7.8
GLOBULIN SER-MCNC: 4 GM/DL (ref 2.4–3.5)
GLUCOSE SERPL-MCNC: 598 MG/DL (ref 74–100)
GLUCOSE UR QL STRIP.AUTO: ABNORMAL MG/DL
HBA1C MFR BLD: 9.2 %
HCO3 ARTERIAL: NORMAL
HCO3 UR-SCNC: 26.6 MMOL/L
HCT VFR BLD AUTO: 45.8 % (ref 42–52)
HGB BLD-MCNC: 15.3 G/DL (ref 12–16)
HGB BLD-MCNC: 15.3 GM/DL (ref 14–18)
HGB BLD-MCNC: NORMAL G/DL
IMM GRANULOCYTES # BLD AUTO: 0.01 X10(3)/MCL (ref 0–0.02)
IMM GRANULOCYTES NFR BLD AUTO: 0.1 % (ref 0–0.43)
KETONES UR QL STRIP.AUTO: NEGATIVE MG/DL
LEUKOCYTE ESTERASE UR QL STRIP.AUTO: NEGATIVE UNIT/L
LYMPHOCYTES # BLD AUTO: 1.73 X10(3)/MCL (ref 0.6–4.6)
LYMPHOCYTES NFR BLD AUTO: 23.2 %
MCH RBC QN AUTO: 30.2 PG (ref 27–31)
MCHC RBC AUTO-ENTMCNC: 33.4 MG/DL (ref 33–36)
MCV RBC AUTO: 90.5 FL (ref 80–94)
MONOCYTES # BLD AUTO: 0.67 X10(3)/MCL (ref 0.1–1.3)
MONOCYTES NFR BLD AUTO: 9 %
NEUTROPHILS # BLD AUTO: 4.9 X10(3)/MCL (ref 2.1–9.2)
NEUTROPHILS NFR BLD AUTO: 65.8 %
NITRITE UR QL STRIP.AUTO: NEGATIVE
NRBC BLD AUTO-RTO: 0 %
PCO2 BLDA: 44 MMHG (ref 35–45)
PCO2 BLDA: NORMAL MM[HG]
PCO2 BLDA: NORMAL MM[HG]
PH SMN: 7.39 [PH] (ref 7.35–7.45)
PH SMN: NORMAL [PH]
PH UR STRIP.AUTO: 7 [PH]
PLATELET # BLD AUTO: 290 X10(3)/MCL (ref 130–400)
PMV BLD AUTO: 10.2 FL (ref 9.4–12.4)
PO2 BLDA: 92 MMHG (ref 80–100)
PO2 BLDA: NORMAL MM[HG]
POC BASE DEFICIT: 1.2 MMOL/L (ref -2–2)
POC COHB: 2.1 %
POC COHB: NORMAL
POC IONIZED CALCIUM: 1.26 MMOL/L (ref 1.12–1.23)
POC IONIZED CALCIUM: NORMAL
POC METHB: 0.8 % (ref 0.4–2)
POC METHB: NORMAL
POC O2HB: 95.7 % (ref 94–97)
POC O2HB: NORMAL
POC SATURATED O2: 97.1 %
POC TEMPERATURE: 37 °C
POCT GLUCOSE: >500 MG/DL (ref 70–110)
POTASSIUM BLD-SCNC: 3.8 MMOL/L (ref 3.5–5)
POTASSIUM BLD-SCNC: NORMAL MMOL/L
POTASSIUM SERPL-SCNC: 4 MMOL/L (ref 3.5–5.1)
PROT SERPL-MCNC: 8.4 GM/DL (ref 6.4–8.3)
PROT UR QL STRIP.AUTO: NEGATIVE MG/DL
RBC # BLD AUTO: 5.06 X10(6)/MCL (ref 4.7–6.1)
RBC #/AREA URNS AUTO: <5 /HPF
RBC UR QL AUTO: NEGATIVE UNIT/L
SATURATED O2 ARTERIAL, I-STAT: NORMAL
SODIUM BLD-SCNC: 131 MMOL/L (ref 137–145)
SODIUM BLD-SCNC: NORMAL MMOL/L
SODIUM SERPL-SCNC: 132 MMOL/L (ref 136–145)
SP GR UR STRIP.AUTO: 1.02 (ref 1–1.03)
SPECIMEN SOURCE: ABNORMAL
SQUAMOUS #/AREA URNS AUTO: <4 /LPF
UROBILINOGEN UR STRIP-ACNC: 0.2 MG/DL
WBC # SPEC AUTO: 7.5 X10(3)/MCL (ref 4.5–11.5)
WBC #/AREA URNS AUTO: <5 /HPF

## 2022-05-20 PROCEDURE — 96361 HYDRATE IV INFUSION ADD-ON: CPT | Mod: NTX

## 2022-05-20 PROCEDURE — 99900035 HC TECH TIME PER 15 MIN (STAT): Mod: NTX

## 2022-05-20 PROCEDURE — 99284 EMERGENCY DEPT VISIT MOD MDM: CPT | Mod: 25,NTX

## 2022-05-20 PROCEDURE — 36415 COLL VENOUS BLD VENIPUNCTURE: CPT | Mod: NTX | Performed by: PHYSICIAN ASSISTANT

## 2022-05-20 PROCEDURE — 85025 COMPLETE CBC W/AUTO DIFF WBC: CPT | Mod: NTX | Performed by: PHYSICIAN ASSISTANT

## 2022-05-20 PROCEDURE — 83036 HEMOGLOBIN GLYCOSYLATED A1C: CPT | Mod: NTX | Performed by: EMERGENCY MEDICINE

## 2022-05-20 PROCEDURE — 80053 COMPREHEN METABOLIC PANEL: CPT | Mod: NTX | Performed by: PHYSICIAN ASSISTANT

## 2022-05-20 PROCEDURE — 36600 WITHDRAWAL OF ARTERIAL BLOOD: CPT | Mod: NTX

## 2022-05-20 PROCEDURE — 96372 THER/PROPH/DIAG INJ SC/IM: CPT | Mod: 59 | Performed by: EMERGENCY MEDICINE

## 2022-05-20 PROCEDURE — 82962 GLUCOSE BLOOD TEST: CPT | Mod: NTX

## 2022-05-20 PROCEDURE — 96360 HYDRATION IV INFUSION INIT: CPT | Mod: NTX

## 2022-05-20 PROCEDURE — 82803 BLOOD GASES ANY COMBINATION: CPT | Mod: NTX

## 2022-05-20 PROCEDURE — 63600175 PHARM REV CODE 636 W HCPCS: Mod: NTX | Performed by: EMERGENCY MEDICINE

## 2022-05-20 PROCEDURE — 81001 URINALYSIS AUTO W/SCOPE: CPT | Mod: NTX | Performed by: PHYSICIAN ASSISTANT

## 2022-05-20 PROCEDURE — 25000003 PHARM REV CODE 250: Mod: NTX | Performed by: PHYSICIAN ASSISTANT

## 2022-05-20 RX ORDER — INSULIN GLARGINE 100 [IU]/ML
10 INJECTION, SOLUTION SUBCUTANEOUS NIGHTLY
Qty: 3 ML | Refills: 11 | Status: SHIPPED | OUTPATIENT
Start: 2022-05-20 | End: 2022-05-27

## 2022-05-20 RX ADMIN — INSULIN HUMAN 16 UNITS: 100 INJECTION, SOLUTION PARENTERAL at 03:05

## 2022-05-20 RX ADMIN — SODIUM CHLORIDE 1000 ML: 9 INJECTION, SOLUTION INTRAVENOUS at 11:05

## 2022-05-20 RX ADMIN — INSULIN HUMAN 8 UNITS: 100 INJECTION, SOLUTION PARENTERAL at 06:05

## 2022-05-20 NOTE — TELEPHONE ENCOUNTER
Received a critical value Glucose level form outside lab- 639.  I called and spoke with Mrs Queen, advised that Mr Queen needs to go to the Emergency Room.  He is not diabetic, but has been diagnosed with pre-diabetes in the past.  Notified MD.  Instructed Mrs Queen to let me know once they get to the local ER.  Verbalized understanding of instructions.

## 2022-05-20 NOTE — FIRST PROVIDER EVALUATION
"Medical screening exam completed.  I have conducted a focused provider triage encounter, findings are as follows:    Brief history of present illness:  36 yo male presents to ED for evaluation of blood glucose of 600+. States called by heart transplant team and told to come to ED. Patient also complains of frozen shoulder. Denies any nausea, vomiting, or abdominal pain.     Vitals:    05/20/22 1111   BP: 112/66   BP Location: Left arm   Patient Position: Sitting   Pulse: 103   Resp: 20   Temp: 99 °F (37.2 °C)   TempSrc: Oral   SpO2: 97%   Weight: 95.3 kg (210 lb)   Height: 6' 3" (1.905 m)       Pertinent physical exam:  Alert, awake and oriented. Ambulated into triage. CBG over 500 in triage.    Brief workup plan:  Will get labs and start IV fluids.     Preliminary workup initiated; this workup will be continued and followed by the physician or advanced practice provider that is assigned to the patient when roomed.  "

## 2022-05-20 NOTE — ED PROVIDER NOTES
Encounter Date: 5/20/2022    SCRIBE #1 NOTE: I, Shena Ochoa, am scribing for, and in the presence of, Bautista Lynch MD. Other sections scribed: HPI, ROS, PE.       History     Chief Complaint   Patient presents with    Hyperglycemia     Sent by PCP due to cbg >600. Denies hx of DM.     Shoulder Pain     Also reports L shoulder pain.      37 year old male with heart failure presents to the ED with hyperglycemia.  Pt reports being told before that he was prediabetic, but says that in the past he has had one dose of insulin and been discharged.  Pt reports that he is under the care of a transplant team in [place].  Pt denies any fever, chills, SOB, CP, N/V/D.  He says he does not drink ETOH or follow a diabetic diet.     The history is provided by the patient.   Diabetes  This is a new problem. No MedicAlert identification noted. Pertinent negatives for diabetes include no chest pain, no fatigue and no weakness. Pertinent negatives for hypoglycemia include no dizziness, headaches or speech difficulty. Risk factors for coronary artery disease include male sex. When asked about current treatments, none were reported. His home blood glucose trend is increasing steadily.     Review of patient's allergies indicates:   Allergen Reactions    Bumex [bumetanide] Hives    Lactose Diarrhea     Other reaction(s): Abdominal distension, gaseous    Torsemide Hives     Past Medical History:   Diagnosis Date    Arthritis     Cardiomyopathy     CHF (congestive heart failure) 10/01/2020    Dilated cardiomyopathy 10/26/2020    Drug abuse 10/2020    Renal disorder      Past Surgical History:   Procedure Laterality Date    CARDIAC DEFIBRILLATOR PLACEMENT      RIGHT HEART CATHETERIZATION Right 4/8/2022    Procedure: INSERTION, CATHETER, RIGHT HEART;  Surgeon: Luca Lopez Jr., MD;  Location: [facility] CATH LAB;  Service: Cardiology;  Laterality: Right;    RIGHT HEART CATHETERIZATION Right 4/19/2022    Procedure:  INSERTION, CATHETER, RIGHT HEART;  Surgeon: Josh Pulido MD;  Location: [facility] CATH LAB;  Service: Cardiology;  Laterality: Right;     Family History   Problem Relation Age of Onset    Heart failure Father     Diverticulosis Brother     Heart attack Maternal Grandmother     Heart attack Maternal Grandfather      Social History     Tobacco Use    Smoking status: Former Smoker     Packs/day: 0.50     Years: 16.00     Pack years: 8.00     Start date: 10/1/2004     Quit date: 10/1/2020     Years since quittin.6    Smokeless tobacco: Never Used   Substance Use Topics    Alcohol use: Not Currently    Drug use: Not Currently     Types: Marijuana, MDMA (Ecstacy)     Review of Systems   Constitutional: Negative for chills, fatigue, fever and unexpected weight change.   HENT: Negative for congestion, ear discharge, ear pain, nosebleeds, rhinorrhea and sore throat.    Eyes: Negative for pain, redness and visual disturbance.   Respiratory: Negative for cough, chest tightness, shortness of breath and wheezing.    Cardiovascular: Negative for chest pain and leg swelling.   Gastrointestinal: Negative for abdominal pain, blood in stool, constipation, diarrhea, nausea and vomiting.   Genitourinary: Negative for dysuria and hematuria.   Musculoskeletal: Negative for arthralgias, back pain, joint swelling, myalgias and neck pain.   Skin: Negative for rash.   Allergic/Immunologic: Negative for environmental allergies, food allergies and immunocompromised state.   Neurological: Negative for dizziness, speech difficulty, weakness, light-headedness, numbness and headaches.   Hematological: Does not bruise/bleed easily.   Psychiatric/Behavioral: Negative for sleep disturbance and suicidal ideas.       Physical Exam     Initial Vitals [22 1111]   BP Pulse Resp Temp SpO2   112/66 103 20 99 °F (37.2 °C) 97 %      MAP       --         Physical Exam    Nursing note and vitals reviewed.  Constitutional: No distress.    HENT:   Head: Normocephalic and atraumatic.   Eyes: EOM are normal. Pupils are equal, round, and reactive to light.   Neck: Trachea normal.   Normal range of motion.  Cardiovascular: Normal rate and regular rhythm.   No murmur heard.  Pulmonary/Chest: Breath sounds normal. No respiratory distress.   Abdominal: Abdomen is soft. Bowel sounds are normal. He exhibits no distension. There is no abdominal tenderness.   Musculoskeletal:         General: Normal range of motion.      Cervical back: Normal range of motion.      Lumbar back: Normal.     Neurological: He is alert and oriented to person, place, and time. He has normal strength. No cranial nerve deficit.   Skin: Skin is warm and dry. No rash noted.   PICC line in place in left upper arm   Psychiatric: He has a normal mood and affect. Judgment normal.         ED Course   Procedures  Labs Reviewed   COMPREHENSIVE METABOLIC PANEL - Abnormal; Notable for the following components:       Result Value    Sodium Level 132 (*)     Chloride 92 (*)     Glucose Level 598 (*)     Creatinine 2.02 (*)     Calcium Level Total 10.7 (*)     Protein Total 8.4 (*)     Globulin 4.0 (*)     All other components within normal limits   URINALYSIS, REFLEX TO URINE CULTURE - Abnormal; Notable for the following components:    Glucose, UA 3+ (*)     All other components within normal limits   HEMOGLOBIN A1C - Abnormal; Notable for the following components:    Hemoglobin A1c 9.2 (*)     All other components within normal limits   POCT GLUCOSE - Abnormal; Notable for the following components:    POCT Glucose >500 (*)     All other components within normal limits   URINALYSIS, MICROSCOPIC - Normal   CBC W/ AUTO DIFFERENTIAL    Narrative:     The following orders were created for panel order CBC auto differential.  Procedure                               Abnormality         Status                     ---------                               -----------         ------                     CBC  with Differential[[id number]]                            Final result                 Please view results for these tests on the individual orders.   CBC WITH DIFFERENTIAL   POCT GLUCOSE, HAND-HELD DEVICE   POCT GLUCOSE, HAND-HELD DEVICE          Imaging Results    None          Medications   sodium chloride 0.9% bolus 1,000 mL (0 mLs Intravenous Stopped 5/20/22 2004)   insulin regular injection 16 Units (16 Units Subcutaneous Given 5/20/22 1530)   insulin regular injection 8 Units (8 Units Subcutaneous Given 5/20/22 1830)     Medical Decision Making:   Clinical Tests:   Lab Tests: Ordered and Reviewed  Radiological Study: Ordered and Reviewed            Attending Attestation:           Physician Attestation for Scribe:  Physician Attestation Statement for Scribe #1: I, Bautista Lynch MD, reviewed documentation, as scribed by Shena Ochoa in my presence, and it is both accurate and complete.                      Clinical Impression:   Final diagnoses:  [R73.9] Hyperglycemia (Primary)          ED Disposition Condition    Discharge Stable        ED Prescriptions     Medication Sig Dispense Start Date End Date Auth. Provider    insulin (LANTUS SOLOSTAR U-100 INSULIN) glargine 100 units/mL (3mL) SubQ pen Inject 10 Units into the skin every evening. 3 mL 5/20/2022 5/20/2023 Bautista Lynch III, MD        Follow-up Information     Follow up With Specialties Details Why Contact Info    ORALIA Cline Nurse Practitioner In 3 days  [address] [place] [id number] [phone]             Bautista Lynch III, MD  05/20/22 2048

## 2022-05-21 LAB
POCT GLUCOSE: 254 MG/DL (ref 70–110)
POCT GLUCOSE: 320 MG/DL (ref 70–110)
POCT GLUCOSE: 412 MG/DL (ref 70–110)
POCT GLUCOSE: 452 MG/DL (ref 70–110)

## 2022-05-21 NOTE — ED NOTES
8 u reg insulin given at this time - documented as 1830 - was pending pharmacy verification. CBG to be redrawn @ 2030.

## 2022-05-23 ENCOUNTER — TELEPHONE (OUTPATIENT)
Dept: TRANSPLANT | Facility: CLINIC | Age: 37
End: 2022-05-23
Payer: MEDICAID

## 2022-05-23 NOTE — TELEPHONE ENCOUNTER
Pt was DC from ER over the weekend.  I called him and let him know that he will need to get labs before his appointment with Dr Yang tomorrow.  He has not called ALDO Armendariz, his NP yet.  Instructed that it is very important to call her today and get in to see her so that she can manage his insulin and make sure dosing is accurate.  Verbalized understanding of instructions.

## 2022-05-24 ENCOUNTER — HOSPITAL ENCOUNTER (INPATIENT)
Facility: HOSPITAL | Age: 37
LOS: 3 days | Discharge: HOME | DRG: 637 | End: 2022-05-27
Attending: EMERGENCY MEDICINE | Admitting: INTERNAL MEDICINE
Payer: MEDICAID

## 2022-05-24 ENCOUNTER — DOCUMENTATION ONLY (OUTPATIENT)
Dept: CARDIOLOGY | Facility: HOSPITAL | Age: 37
End: 2022-05-24
Payer: MEDICAID

## 2022-05-24 ENCOUNTER — OFFICE VISIT (OUTPATIENT)
Dept: TRANSPLANT | Facility: CLINIC | Age: 37
DRG: 637 | End: 2022-05-24
Payer: MEDICAID

## 2022-05-24 ENCOUNTER — TELEPHONE (OUTPATIENT)
Dept: TRANSPLANT | Facility: CLINIC | Age: 37
End: 2022-05-24
Payer: MEDICAID

## 2022-05-24 ENCOUNTER — PATIENT OUTREACH (OUTPATIENT)
Dept: DIABETES | Facility: CLINIC | Age: 37
End: 2022-05-24
Payer: MEDICAID

## 2022-05-24 VITALS — BODY MASS INDEX: 26.13 KG/M2 | HEIGHT: 75 IN | WEIGHT: 210.13 LBS

## 2022-05-24 DIAGNOSIS — R53.83 FATIGUE: ICD-10-CM

## 2022-05-24 DIAGNOSIS — R73.9 HYPERGLYCEMIA: Primary | ICD-10-CM

## 2022-05-24 DIAGNOSIS — N18.31 STAGE 3A CHRONIC KIDNEY DISEASE: ICD-10-CM

## 2022-05-24 DIAGNOSIS — I95.9 HYPOTENSION, UNSPECIFIED HYPOTENSION TYPE: Primary | ICD-10-CM

## 2022-05-24 DIAGNOSIS — F41.9 ANXIETY DISORDER, UNSPECIFIED TYPE: ICD-10-CM

## 2022-05-24 DIAGNOSIS — I50.42 CHRONIC COMBINED SYSTOLIC AND DIASTOLIC CONGESTIVE HEART FAILURE: ICD-10-CM

## 2022-05-24 DIAGNOSIS — E11.00 HYPEROSMOLAR HYPERGLYCEMIC STATE (HHS): ICD-10-CM

## 2022-05-24 DIAGNOSIS — I50.20 HFREF (HEART FAILURE WITH REDUCED EJECTION FRACTION): ICD-10-CM

## 2022-05-24 DIAGNOSIS — I42.0 DILATED CARDIOMYOPATHY: ICD-10-CM

## 2022-05-24 DIAGNOSIS — E11.65 TYPE 2 DIABETES MELLITUS WITH HYPERGLYCEMIA, WITHOUT LONG-TERM CURRENT USE OF INSULIN: ICD-10-CM

## 2022-05-24 DIAGNOSIS — Z72.0 TOBACCO USE: ICD-10-CM

## 2022-05-24 DIAGNOSIS — M75.00 ADHESIVE CAPSULITIS OF SHOULDER, UNSPECIFIED LATERALITY: ICD-10-CM

## 2022-05-24 DIAGNOSIS — E11.9 NEWLY DIAGNOSED DIABETES: Primary | ICD-10-CM

## 2022-05-24 DIAGNOSIS — R42 LIGHTHEADEDNESS: ICD-10-CM

## 2022-05-24 DIAGNOSIS — E11.65 UNCONTROLLED TYPE 2 DIABETES MELLITUS WITH HYPERGLYCEMIA: ICD-10-CM

## 2022-05-24 DIAGNOSIS — Z95.810 ICD (IMPLANTABLE CARDIOVERTER-DEFIBRILLATOR) IN PLACE: ICD-10-CM

## 2022-05-24 DIAGNOSIS — D63.8 ANEMIA OF CHRONIC DISEASE: ICD-10-CM

## 2022-05-24 DIAGNOSIS — N17.9 AKI (ACUTE KIDNEY INJURY): ICD-10-CM

## 2022-05-24 PROBLEM — E11.10 DKA (DIABETIC KETOACIDOSIS): Status: ACTIVE | Noted: 2022-05-24

## 2022-05-24 LAB
ALBUMIN SERPL BCP-MCNC: 4.2 G/DL (ref 3.5–5.2)
ALLENS TEST: ABNORMAL
ALLENS TEST: ABNORMAL
ALP SERPL-CCNC: 110 U/L (ref 55–135)
ALT SERPL W/O P-5'-P-CCNC: 26 U/L (ref 10–44)
ANION GAP SERPL CALC-SCNC: 14 MMOL/L (ref 8–16)
ANION GAP SERPL CALC-SCNC: 16 MMOL/L (ref 8–16)
AST SERPL-CCNC: 20 U/L (ref 10–40)
B-OH-BUTYR BLD STRIP-SCNC: 0.2 MMOL/L (ref 0–0.5)
BASOPHILS # BLD AUTO: 0.02 K/UL (ref 0–0.2)
BASOPHILS NFR BLD: 0.2 % (ref 0–1.9)
BILIRUB SERPL-MCNC: 0.7 MG/DL (ref 0.1–1)
BUN SERPL-MCNC: 36 MG/DL (ref 6–20)
BUN SERPL-MCNC: 40 MG/DL (ref 6–20)
CALCIUM SERPL-MCNC: 10.1 MG/DL (ref 8.7–10.5)
CALCIUM SERPL-MCNC: 10.7 MG/DL (ref 8.7–10.5)
CHLORIDE SERPL-SCNC: 83 MMOL/L (ref 95–110)
CHLORIDE SERPL-SCNC: 92 MMOL/L (ref 95–110)
CO2 SERPL-SCNC: 23 MMOL/L (ref 23–29)
CO2 SERPL-SCNC: 27 MMOL/L (ref 23–29)
CREAT SERPL-MCNC: 2.6 MG/DL (ref 0.5–1.4)
CREAT SERPL-MCNC: 2.8 MG/DL (ref 0.5–1.4)
CTP QC/QA: YES
DELSYS: ABNORMAL
DIFFERENTIAL METHOD: NORMAL
EOSINOPHIL # BLD AUTO: 0.1 K/UL (ref 0–0.5)
EOSINOPHIL NFR BLD: 0.6 % (ref 0–8)
ERYTHROCYTE [DISTWIDTH] IN BLOOD BY AUTOMATED COUNT: 12 % (ref 11.5–14.5)
EST. GFR  (AFRICAN AMERICAN): 31.9 ML/MIN/1.73 M^2
EST. GFR  (AFRICAN AMERICAN): 34.9 ML/MIN/1.73 M^2
EST. GFR  (NON AFRICAN AMERICAN): 27.6 ML/MIN/1.73 M^2
EST. GFR  (NON AFRICAN AMERICAN): 30.2 ML/MIN/1.73 M^2
GLUCOSE SERPL-MCNC: 168 MG/DL (ref 70–110)
GLUCOSE SERPL-MCNC: 784 MG/DL (ref 70–110)
HCO3 UR-SCNC: 30.8 MMOL/L (ref 24–28)
HCO3 UR-SCNC: 31.4 MMOL/L (ref 24–28)
HCT VFR BLD AUTO: 41.1 % (ref 40–54)
HGB BLD-MCNC: 14.4 G/DL (ref 14–18)
IMM GRANULOCYTES # BLD AUTO: 0.02 K/UL (ref 0–0.04)
IMM GRANULOCYTES NFR BLD AUTO: 0.2 % (ref 0–0.5)
LYMPHOCYTES # BLD AUTO: 1.8 K/UL (ref 1–4.8)
LYMPHOCYTES NFR BLD: 21.1 % (ref 18–48)
MAGNESIUM SERPL-MCNC: 2.6 MG/DL (ref 1.6–2.6)
MCH RBC QN AUTO: 30.6 PG (ref 27–31)
MCHC RBC AUTO-ENTMCNC: 35 G/DL (ref 32–36)
MCV RBC AUTO: 87 FL (ref 82–98)
MONOCYTES # BLD AUTO: 0.6 K/UL (ref 0.3–1)
MONOCYTES NFR BLD: 7.5 % (ref 4–15)
NEUTROPHILS # BLD AUTO: 6 K/UL (ref 1.8–7.7)
NEUTROPHILS NFR BLD: 70.4 % (ref 38–73)
NRBC BLD-RTO: 0 /100 WBC
OSMOLALITY SERPL: 313 MOSM/KG (ref 280–300)
PCO2 BLDA: 58.4 MMHG (ref 35–45)
PCO2 BLDA: 59.4 MMHG (ref 35–45)
PH SMN: 7.32 [PH] (ref 7.35–7.45)
PH SMN: 7.34 [PH] (ref 7.35–7.45)
PLATELET # BLD AUTO: 305 K/UL (ref 150–450)
PMV BLD AUTO: 10.6 FL (ref 9.2–12.9)
PO2 BLDA: 29 MMHG (ref 40–60)
PO2 BLDA: 29 MMHG (ref 40–60)
POC BE: 5 MMOL/L
POC BE: 6 MMOL/L
POC PCO2 TEMP: 58.4 MMHG
POC PH TEMP: 7.34
POC PO2 TEMP: 29 MMHG
POC SATURATED O2: 48 % (ref 95–100)
POC SATURATED O2: 49 % (ref 95–100)
POC TCO2: 33 MMOL/L (ref 24–29)
POC TCO2: 33 MMOL/L (ref 24–29)
POC TEMPERATURE: ABNORMAL
POCT GLUCOSE: 119 MG/DL (ref 70–110)
POCT GLUCOSE: 133 MG/DL (ref 70–110)
POCT GLUCOSE: 144 MG/DL (ref 70–110)
POCT GLUCOSE: 223 MG/DL (ref 70–110)
POCT GLUCOSE: >500 MG/DL (ref 70–110)
POTASSIUM SERPL-SCNC: 3 MMOL/L (ref 3.5–5.1)
POTASSIUM SERPL-SCNC: 4.3 MMOL/L (ref 3.5–5.1)
PROT SERPL-MCNC: 8.5 G/DL (ref 6–8.4)
RBC # BLD AUTO: 4.71 M/UL (ref 4.6–6.2)
SAMPLE: ABNORMAL
SAMPLE: ABNORMAL
SARS-COV-2 RDRP RESP QL NAA+PROBE: NEGATIVE
SITE: ABNORMAL
SITE: ABNORMAL
SODIUM SERPL-SCNC: 120 MMOL/L (ref 136–145)
SODIUM SERPL-SCNC: 135 MMOL/L (ref 136–145)
TIME NOTIFIED: 2017
TROPONIN I SERPL DL<=0.01 NG/ML-MCNC: 0.08 NG/ML (ref 0–0.03)
WBC # BLD AUTO: 8.5 K/UL (ref 3.9–12.7)

## 2022-05-24 PROCEDURE — 99999 PR PBB SHADOW E&M-EST. PATIENT-LVL III: CPT | Mod: PBBFAC,TXP,, | Performed by: INTERNAL MEDICINE

## 2022-05-24 PROCEDURE — 63600175 PHARM REV CODE 636 W HCPCS: Mod: NTX | Performed by: STUDENT IN AN ORGANIZED HEALTH CARE EDUCATION/TRAINING PROGRAM

## 2022-05-24 PROCEDURE — 99285 EMERGENCY DEPT VISIT HI MDM: CPT | Mod: NTX,CS,, | Performed by: EMERGENCY MEDICINE

## 2022-05-24 PROCEDURE — 3008F PR BODY MASS INDEX (BMI) DOCUMENTED: ICD-10-PCS | Mod: CPTII,NTX,, | Performed by: INTERNAL MEDICINE

## 2022-05-24 PROCEDURE — 4010F PR ACE/ARB THEARPY RXD/TAKEN: ICD-10-PCS | Mod: CPTII,NTX,, | Performed by: INTERNAL MEDICINE

## 2022-05-24 PROCEDURE — 99999 PR PBB SHADOW E&M-EST. PATIENT-LVL III: ICD-10-PCS | Mod: PBBFAC,TXP,, | Performed by: INTERNAL MEDICINE

## 2022-05-24 PROCEDURE — 82010 KETONE BODYS QUAN: CPT | Mod: NTX | Performed by: EMERGENCY MEDICINE

## 2022-05-24 PROCEDURE — 4010F ACE/ARB THERAPY RXD/TAKEN: CPT | Mod: CPTII,NTX,, | Performed by: INTERNAL MEDICINE

## 2022-05-24 PROCEDURE — 99213 OFFICE O/P EST LOW 20 MIN: CPT | Mod: PBBFAC,25,TXP | Performed by: INTERNAL MEDICINE

## 2022-05-24 PROCEDURE — 1160F RVW MEDS BY RX/DR IN RCRD: CPT | Mod: CPTII,NTX,, | Performed by: INTERNAL MEDICINE

## 2022-05-24 PROCEDURE — 80048 BASIC METABOLIC PNL TOTAL CA: CPT | Mod: 91,NTX,XB | Performed by: INTERNAL MEDICINE

## 2022-05-24 PROCEDURE — 3044F PR MOST RECENT HEMOGLOBIN A1C LEVEL <7.0%: ICD-10-PCS | Mod: CPTII,NTX,, | Performed by: INTERNAL MEDICINE

## 2022-05-24 PROCEDURE — 1111F PR DISCHARGE MEDS RECONCILED W/ CURRENT OUTPATIENT MED LIST: ICD-10-PCS | Mod: CPTII,NTX,, | Performed by: INTERNAL MEDICINE

## 2022-05-24 PROCEDURE — 25000003 PHARM REV CODE 250: Mod: NTX | Performed by: STUDENT IN AN ORGANIZED HEALTH CARE EDUCATION/TRAINING PROGRAM

## 2022-05-24 PROCEDURE — 82800 BLOOD PH: CPT | Mod: NTX

## 2022-05-24 PROCEDURE — 1160F PR REVIEW ALL MEDS BY PRESCRIBER/CLIN PHARMACIST DOCUMENTED: ICD-10-PCS | Mod: CPTII,NTX,, | Performed by: INTERNAL MEDICINE

## 2022-05-24 PROCEDURE — 3008F BODY MASS INDEX DOCD: CPT | Mod: CPTII,NTX,, | Performed by: INTERNAL MEDICINE

## 2022-05-24 PROCEDURE — 99285 PR EMERGENCY DEPT VISIT,LEVEL V: ICD-10-PCS | Mod: NTX,CS,, | Performed by: EMERGENCY MEDICINE

## 2022-05-24 PROCEDURE — U0002 COVID-19 LAB TEST NON-CDC: HCPCS | Mod: NTX | Performed by: STUDENT IN AN ORGANIZED HEALTH CARE EDUCATION/TRAINING PROGRAM

## 2022-05-24 PROCEDURE — 93010 ELECTROCARDIOGRAM REPORT: CPT | Mod: NTX,,, | Performed by: INTERNAL MEDICINE

## 2022-05-24 PROCEDURE — 1111F DSCHRG MED/CURRENT MED MERGE: CPT | Mod: CPTII,NTX,, | Performed by: INTERNAL MEDICINE

## 2022-05-24 PROCEDURE — 84484 ASSAY OF TROPONIN QUANT: CPT | Mod: NTX | Performed by: EMERGENCY MEDICINE

## 2022-05-24 PROCEDURE — 1159F MED LIST DOCD IN RCRD: CPT | Mod: CPTII,NTX,, | Performed by: INTERNAL MEDICINE

## 2022-05-24 PROCEDURE — 99214 PR OFFICE/OUTPT VISIT, EST, LEVL IV, 30-39 MIN: ICD-10-PCS | Mod: S$PBB,NTX,, | Performed by: INTERNAL MEDICINE

## 2022-05-24 PROCEDURE — 85025 COMPLETE CBC W/AUTO DIFF WBC: CPT | Mod: NTX | Performed by: EMERGENCY MEDICINE

## 2022-05-24 PROCEDURE — 93010 EKG 12-LEAD: ICD-10-PCS | Mod: NTX,,, | Performed by: INTERNAL MEDICINE

## 2022-05-24 PROCEDURE — 25000003 PHARM REV CODE 250: Mod: NTX | Performed by: EMERGENCY MEDICINE

## 2022-05-24 PROCEDURE — 99285 EMERGENCY DEPT VISIT HI MDM: CPT | Mod: 25,27,NTX

## 2022-05-24 PROCEDURE — 80053 COMPREHEN METABOLIC PANEL: CPT | Mod: NTX | Performed by: EMERGENCY MEDICINE

## 2022-05-24 PROCEDURE — 20000000 HC ICU ROOM: Mod: NTX

## 2022-05-24 PROCEDURE — 82803 BLOOD GASES ANY COMBINATION: CPT | Mod: NTX

## 2022-05-24 PROCEDURE — 99900035 HC TECH TIME PER 15 MIN (STAT): Mod: NTX

## 2022-05-24 PROCEDURE — 82962 GLUCOSE BLOOD TEST: CPT | Mod: NTX

## 2022-05-24 PROCEDURE — 3044F HG A1C LEVEL LT 7.0%: CPT | Mod: CPTII,NTX,, | Performed by: INTERNAL MEDICINE

## 2022-05-24 PROCEDURE — 83930 ASSAY OF BLOOD OSMOLALITY: CPT | Mod: NTX | Performed by: EMERGENCY MEDICINE

## 2022-05-24 PROCEDURE — 99214 OFFICE O/P EST MOD 30 MIN: CPT | Mod: S$PBB,NTX,, | Performed by: INTERNAL MEDICINE

## 2022-05-24 PROCEDURE — 1159F PR MEDICATION LIST DOCUMENTED IN MEDICAL RECORD: ICD-10-PCS | Mod: CPTII,NTX,, | Performed by: INTERNAL MEDICINE

## 2022-05-24 PROCEDURE — 36600 WITHDRAWAL OF ARTERIAL BLOOD: CPT | Mod: NTX

## 2022-05-24 PROCEDURE — 93005 ELECTROCARDIOGRAM TRACING: CPT | Mod: NTX

## 2022-05-24 PROCEDURE — 83735 ASSAY OF MAGNESIUM: CPT | Mod: NTX | Performed by: EMERGENCY MEDICINE

## 2022-05-24 RX ORDER — SODIUM CHLORIDE 450 MG/100ML
INJECTION, SOLUTION INTRAVENOUS CONTINUOUS
Status: DISCONTINUED | OUTPATIENT
Start: 2022-05-24 | End: 2022-05-25

## 2022-05-24 RX ORDER — DEXTROSE MONOHYDRATE 100 MG/ML
INJECTION, SOLUTION INTRAVENOUS
Status: DISCONTINUED | OUTPATIENT
Start: 2022-05-24 | End: 2022-05-24

## 2022-05-24 RX ORDER — SODIUM CHLORIDE 0.9 % (FLUSH) 0.9 %
10 SYRINGE (ML) INJECTION
Status: DISCONTINUED | OUTPATIENT
Start: 2022-05-24 | End: 2022-05-27 | Stop reason: HOSPADM

## 2022-05-24 RX ORDER — DEXTROSE MONOHYDRATE 100 MG/ML
INJECTION, SOLUTION INTRAVENOUS
Status: DISCONTINUED | OUTPATIENT
Start: 2022-05-24 | End: 2022-05-27 | Stop reason: HOSPADM

## 2022-05-24 RX ORDER — DOBUTAMINE HYDROCHLORIDE 400 MG/100ML
2.5 INJECTION INTRAVENOUS CONTINUOUS
Status: DISCONTINUED | OUTPATIENT
Start: 2022-05-24 | End: 2022-05-26

## 2022-05-24 RX ORDER — ONDANSETRON 2 MG/ML
4 INJECTION INTRAMUSCULAR; INTRAVENOUS ONCE
Status: COMPLETED | OUTPATIENT
Start: 2022-05-24 | End: 2022-05-24

## 2022-05-24 RX ORDER — PEN NEEDLE, DIABETIC 32GX 5/32"
NEEDLE, DISPOSABLE MISCELLANEOUS
Status: ON HOLD | COMMUNITY
Start: 2022-05-23 | End: 2022-05-27 | Stop reason: SDUPTHER

## 2022-05-24 RX ORDER — MUPIROCIN 20 MG/G
OINTMENT TOPICAL 2 TIMES DAILY
Status: DISCONTINUED | OUTPATIENT
Start: 2022-05-25 | End: 2022-05-27 | Stop reason: HOSPADM

## 2022-05-24 RX ORDER — POTASSIUM CHLORIDE 29.8 MG/ML
40 INJECTION INTRAVENOUS ONCE
Status: COMPLETED | OUTPATIENT
Start: 2022-05-25 | End: 2022-05-24

## 2022-05-24 RX ADMIN — SODIUM CHLORIDE 500 ML: 0.9 INJECTION, SOLUTION INTRAVENOUS at 05:05

## 2022-05-24 RX ADMIN — ONDANSETRON 4 MG: 2 INJECTION INTRAMUSCULAR; INTRAVENOUS at 08:05

## 2022-05-24 RX ADMIN — INSULIN HUMAN 4 UNITS/HR: 1 INJECTION, SOLUTION INTRAVENOUS at 08:05

## 2022-05-24 RX ADMIN — DOBUTAMINE HYDROCHLORIDE 2.5 MCG/KG/MIN: 400 INJECTION INTRAVENOUS at 06:05

## 2022-05-24 RX ADMIN — INSULIN HUMAN 2 UNITS/HR: 1 INJECTION, SOLUTION INTRAVENOUS at 09:05

## 2022-05-24 RX ADMIN — POTASSIUM CHLORIDE 40 MEQ: 29.8 INJECTION, SOLUTION INTRAVENOUS at 11:05

## 2022-05-24 RX ADMIN — INSULIN HUMAN 6 UNITS/HR: 1 INJECTION, SOLUTION INTRAVENOUS at 05:05

## 2022-05-24 NOTE — ASSESSMENT & PLAN NOTE
On GDMT along with Milrinone. SBO above 110 in ER. He appears dry on Exam.  Milrinone held at 4.30  and start Dobutamine at 6

## 2022-05-24 NOTE — SUBJECTIVE & OBJECTIVE
"Past Medical History:   Diagnosis Date    Arthritis     Cardiomyopathy     CHF (congestive heart failure) 10/01/2020    Dilated cardiomyopathy 10/26/2020    Drug abuse 10/2020    Renal disorder        Past Surgical History:   Procedure Laterality Date    CARDIAC DEFIBRILLATOR PLACEMENT      RIGHT HEART CATHETERIZATION Right 4/8/2022    Procedure: INSERTION, CATHETER, RIGHT HEART;  Surgeon: Luca Lopez Jr., MD;  Location: [facility] CATH LAB;  Service: Cardiology;  Laterality: Right;    RIGHT HEART CATHETERIZATION Right 4/19/2022    Procedure: INSERTION, CATHETER, RIGHT HEART;  Surgeon: Josh Pulido MD;  Location: [facility] CATH LAB;  Service: Cardiology;  Laterality: Right;       Review of patient's allergies indicates:   Allergen Reactions    Bumex [bumetanide] Hives    Lactose Diarrhea     Other reaction(s): Abdominal distension, gaseous    Torsemide Hives       Current Facility-Administered Medications   Medication    dextrose 10 % infusion    dextrose 10 % infusion    dextrose 10% bolus 125 mL    dextrose 10% bolus 250 mL    DOBUtamine 1000 mg in D5W 250 mL infusion    insulin regular in 0.9 % NaCl 100 unit/100 mL (1 unit/mL) infusion    sodium chloride 0.9% bolus 500 mL    sodium chloride 0.9% flush 10 mL    sodium chloride 0.9% flush 10 mL     Current Outpatient Medications   Medication Sig    albuterol (PROVENTIL/VENTOLIN HFA) 90 mcg/actuation inhaler INHALE 2 PUFFS BY MOUTH EVERY 4 HOURS AS NEEDED FOR COUGH OR WHEEZING    aspirin 81 MG Chew Take 81 mg by mouth once daily.    BD GARTH 2ND GEN PEN NEEDLE 32 gauge x 5/32" Ndle     busPIRone (BUSPAR) 7.5 MG tablet Take 7.5 mg by mouth every 12 (twelve) hours.    digoxin (LANOXIN) 125 mcg tablet Take 1 tablet (0.125 mg total) by mouth once daily.    EScitalopram oxalate (LEXAPRO) 5 MG Tab Take 1 tablet (5 mg total) by mouth once daily.    ferrous sulfate 325 (65 FE) MG EC tablet Take 1 tablet (325 mg total) by mouth once daily. (Patient not taking: Reported " on 2022)    furosemide (LASIX) 80 MG tablet Take 80 mg by mouth 2 (two) times daily.    insulin (LANTUS SOLOSTAR U-100 INSULIN) glargine 100 units/mL (3mL) SubQ pen Inject 10 Units into the skin every evening. (Patient not taking: Reported on 2022)    milrinone 20mg/100ml D5W, 200mcg/ml, (PRIMACOR) 20 mg/100 mL (200 mcg/mL) infusion Inject 34.875 mcg/min into the vein continuous.    mirtazapine (REMERON) 15 MG tablet Take 15 mg by mouth nightly.    pantoprazole (PROTONIX) 40 MG tablet Take 1 tablet (40 mg total) by mouth once daily.    prochlorperazine (COMPAZINE) 5 MG tablet Take 5 mg by mouth every 6 (six) hours as needed.    promethazine (PHENERGAN) 12.5 MG Tab Take 12.5 mg by mouth every 6 (six) hours as needed.    sacubitriL-valsartan (ENTRESTO) 49-51 mg per tablet Take 1 tablet by mouth 2 (two) times daily.    spironolactone (ALDACTONE) 25 MG tablet Take 1 tablet (25 mg total) by mouth once daily.    sucralfate (CARAFATE) 1 gram tablet Take 1 g by mouth nightly.    traMADoL (ULTRAM) 50 mg tablet Take 50 mg by mouth every 6 (six) hours as needed.     Family History       Problem Relation (Age of Onset)    Diverticulosis Brother    Heart attack Maternal Grandmother, Maternal Grandfather    Heart failure Father          Tobacco Use    Smoking status: Former Smoker     Packs/day: 0.50     Years: 16.00     Pack years: 8.00     Start date: 10/1/2004     Quit date: 10/1/2020     Years since quittin.6    Smokeless tobacco: Never Used   Substance and Sexual Activity    Alcohol use: Not Currently    Drug use: Not Currently     Types: Marijuana, MDMA (Ecstacy)    Sexual activity: Yes     Partners: Female     Birth control/protection: None     Review of Systems   Constitutional:  Positive for fatigue.   HENT: Negative.     Eyes: Negative.    Respiratory: Negative.     Cardiovascular: Negative.    Gastrointestinal: Negative.    Endocrine: Negative.    Genitourinary: Negative.    Musculoskeletal: Negative.     Neurological:  Positive for weakness.   Hematological: Negative.    Psychiatric/Behavioral: Negative.     Objective:     Vital Signs (Most Recent):  Temp: 98.3 °F (36.8 °C) (05/24/22 1354)  Pulse: 92 (05/24/22 1539)  Resp: 10 (05/24/22 1539)  BP: 125/69 (05/24/22 1524)  SpO2: 97 % (05/24/22 1539) Vital Signs (24h Range):  Temp:  [98.3 °F (36.8 °C)] 98.3 °F (36.8 °C)  Pulse:  [] 92  Resp:  [10-18] 10  SpO2:  [97 %] 97 %  BP: ()/(60-69) 125/69     Patient Vitals for the past 72 hrs (Last 3 readings):   Weight   05/24/22 1354 95.3 kg (210 lb 1.6 oz)     Body mass index is 26.26 kg/m².    No intake or output data in the 24 hours ending 05/24/22 1728    Physical Exam  Constitutional:       Appearance: Normal appearance.   HENT:      Head: Normocephalic.      Mouth/Throat:      Mouth: Mucous membranes are dry.   Eyes:      Extraocular Movements: Extraocular movements intact.      Pupils: Pupils are equal, round, and reactive to light.   Cardiovascular:      Rate and Rhythm: Normal rate and regular rhythm.   Pulmonary:      Effort: Pulmonary effort is normal.      Breath sounds: Normal breath sounds.   Abdominal:      General: Abdomen is flat. Bowel sounds are normal.      Palpations: Abdomen is soft.   Musculoskeletal:         General: Normal range of motion.      Cervical back: Normal range of motion.   Skin:     General: Skin is warm and dry.      Capillary Refill: Capillary refill takes less than 2 seconds.   Neurological:      General: No focal deficit present.      Mental Status: He is alert.       Significant Labs:  CBC:  Recent Labs   Lab 05/20/22  1203 05/24/22  1237 05/24/22  1607   WBC 7.5 8.52 8.50   RBC 5.06 4.93 4.71   HGB 15.3 15.0 14.4   HCT 45.8 43.1 41.1    323 305   MCV 90.5 87 87   MCH 30.2 30.4 30.6   MCHC 33.4 34.8 35.0     BNP:  Recent Labs   Lab 05/24/22  1237   BNP 49     CMP:  Recent Labs   Lab 05/20/22  1203 05/24/22  1237 05/24/22  1607   GLU  --  853* 784*   CALCIUM 10.7*  9.9 10.1   ALBUMIN 4.4  --  4.2   PROT  --   --  8.5*   * 120* 120*   K 4.0 4.7 4.3   CO2 28 26 23   CL  --  81* 83*   BUN 18.7 37* 36*   CREATININE 2.02* 3.1* 2.8*   ALKPHOS 103  --  110   ALT 32  --  26   AST 29  --  20   BILITOT 0.8  --  0.7      Coagulation:   No results for input(s): PT, INR, APTT in the last 168 hours.  LDH:  No results for input(s): LDH in the last 72 hours.  Microbiology:  Microbiology Results (last 7 days)       ** No results found for the last 168 hours. **            I have reviewed all pertinent labs within the past 24 hours.    Diagnostic Results:  I have reviewed and interpreted all pertinent imaging results/findings within the past 24 hours.

## 2022-05-24 NOTE — ASSESSMENT & PLAN NOTE
-  Presents with DKA.   - Corrected Sodium is 135.  - Will use DKA protocol for repleting electrolytes (K and Phos)  -  gentle 0.9 NaCl bolus given his HFrEF   - Hold Entresto, Spironolactone and Dig given ROC  - BMP Q 6 hours while on insulin gtt for DKA    - Switch his Milirone to  at lower dose in one hour (to avoid accumulation of Milirone)   - Consult endocrine for further recommendation on Type I insulin   - Device check (ordered) Torito given his pain in the left shoulder

## 2022-05-24 NOTE — PROGRESS NOTES
Spoke with Chandrakant- pt needs DM education per request of . Referral placed for Diabetes education. Pt unable to make appt at this time,as he is being sent to ER from Cardiology clinic.    Heena Daniel RN, CCM

## 2022-05-24 NOTE — SUBJECTIVE & OBJECTIVE
"Past Medical History:   Diagnosis Date    Arthritis     Cardiomyopathy     CHF (congestive heart failure) 10/01/2020    Dilated cardiomyopathy 10/26/2020    Drug abuse 10/2020    Renal disorder        Past Surgical History:   Procedure Laterality Date    CARDIAC DEFIBRILLATOR PLACEMENT      RIGHT HEART CATHETERIZATION Right 4/8/2022    Procedure: INSERTION, CATHETER, RIGHT HEART;  Surgeon: Luca Lopez Jr., MD;  Location: [facility] CATH LAB;  Service: Cardiology;  Laterality: Right;    RIGHT HEART CATHETERIZATION Right 4/19/2022    Procedure: INSERTION, CATHETER, RIGHT HEART;  Surgeon: Josh Pulido MD;  Location: [facility] CATH LAB;  Service: Cardiology;  Laterality: Right;       Review of patient's allergies indicates:   Allergen Reactions    Bumex [bumetanide] Hives    Lactose Diarrhea     Other reaction(s): Abdominal distension, gaseous    Torsemide Hives       No current facility-administered medications on file prior to encounter.     Current Outpatient Medications on File Prior to Encounter   Medication Sig    albuterol (PROVENTIL/VENTOLIN HFA) 90 mcg/actuation inhaler INHALE 2 PUFFS BY MOUTH EVERY 4 HOURS AS NEEDED FOR COUGH OR WHEEZING    aspirin 81 MG Chew Take 81 mg by mouth once daily.    BD GARTH 2ND GEN PEN NEEDLE 32 gauge x 5/32" Ndle     busPIRone (BUSPAR) 7.5 MG tablet Take 7.5 mg by mouth every 12 (twelve) hours.    digoxin (LANOXIN) 125 mcg tablet Take 1 tablet (0.125 mg total) by mouth once daily.    EScitalopram oxalate (LEXAPRO) 5 MG Tab Take 1 tablet (5 mg total) by mouth once daily.    ferrous sulfate 325 (65 FE) MG EC tablet Take 1 tablet (325 mg total) by mouth once daily. (Patient not taking: Reported on 5/24/2022)    furosemide (LASIX) 80 MG tablet Take 80 mg by mouth 2 (two) times daily.    insulin (LANTUS SOLOSTAR U-100 INSULIN) glargine 100 units/mL (3mL) SubQ pen Inject 10 Units into the skin every evening. (Patient not taking: Reported on 5/24/2022)    milrinone 20mg/100ml D5W, " 200mcg/ml, (PRIMACOR) 20 mg/100 mL (200 mcg/mL) infusion Inject 34.875 mcg/min into the vein continuous.    mirtazapine (REMERON) 15 MG tablet Take 15 mg by mouth nightly.    pantoprazole (PROTONIX) 40 MG tablet Take 1 tablet (40 mg total) by mouth once daily.    prochlorperazine (COMPAZINE) 5 MG tablet Take 5 mg by mouth every 6 (six) hours as needed.    promethazine (PHENERGAN) 12.5 MG Tab Take 12.5 mg by mouth every 6 (six) hours as needed.    sacubitriL-valsartan (ENTRESTO) 49-51 mg per tablet Take 1 tablet by mouth 2 (two) times daily.    spironolactone (ALDACTONE) 25 MG tablet Take 1 tablet (25 mg total) by mouth once daily.    sucralfate (CARAFATE) 1 gram tablet Take 1 g by mouth nightly.    traMADoL (ULTRAM) 50 mg tablet Take 50 mg by mouth every 6 (six) hours as needed.    [DISCONTINUED] metOLazone (ZAROXOLYN) 2.5 MG tablet Take 2.5 mg by mouth every other day.    [DISCONTINUED] metoprolol succinate (TOPROL-XL) 25 MG 24 hr tablet TAKE 1 TABLET(25 MG) BY MOUTH EVERY DAY     Family History       Problem Relation (Age of Onset)    Diverticulosis Brother    Heart attack Maternal Grandmother, Maternal Grandfather    Heart failure Father          Tobacco Use    Smoking status: Former Smoker     Packs/day: 0.50     Years: 16.00     Pack years: 8.00     Start date: 10/1/2004     Quit date: 10/1/2020     Years since quittin.6    Smokeless tobacco: Never Used   Substance and Sexual Activity    Alcohol use: Not Currently    Drug use: Not Currently     Types: Marijuana, MDMA (Ecstacy)    Sexual activity: Yes     Partners: Female     Birth control/protection: None     Review of Systems   Constitutional: Positive for decreased appetite, diaphoresis and malaise/fatigue. Negative for chills.   HENT:  Negative for congestion.    Cardiovascular:  Negative for chest pain, irregular heartbeat and leg swelling.   Respiratory:  Negative for cough and shortness of breath.    Endocrine: Negative for cold intolerance and heat  intolerance.   Skin:  Negative for rash.   Musculoskeletal:  Negative for arthritis and back pain.   Gastrointestinal:  Positive for abdominal pain. Negative for constipation and diarrhea.   Genitourinary:  Negative for dysuria and hematuria.   Neurological:  Positive for difficulty with concentration, headaches and light-headedness. Negative for dizziness, loss of balance and sensory change.   Psychiatric/Behavioral:  Negative for altered mental status.    Objective:     Vital Signs (Most Recent):  Temp: 98.3 °F (36.8 °C) (05/24/22 1354)  Pulse: 92 (05/24/22 1539)  Resp: 10 (05/24/22 1539)  BP: 125/69 (05/24/22 1524)  SpO2: 97 % (05/24/22 1539) Vital Signs (24h Range):  Temp:  [98.3 °F (36.8 °C)] 98.3 °F (36.8 °C)  Pulse:  [] 92  Resp:  [10-18] 10  SpO2:  [97 %] 97 %  BP: ()/(60-69) 125/69     Weight: 95.3 kg (210 lb 1.6 oz)  Body mass index is 26.26 kg/m².    SpO2: 97 %       No intake or output data in the 24 hours ending 05/24/22 1621    Lines/Drains/Airways       Peripherally Inserted Central Catheter Line  Duration             PICC Double Lumen 11/01/21 right brachial 204 days                    Physical Exam  Vitals reviewed.   Constitutional:       General: He is not in acute distress.     Appearance: He is ill-appearing.   HENT:      Head: Normocephalic.   Eyes:      Pupils: Pupils are equal, round, and reactive to light.   Neck:      Thyroid: No thyromegaly.      Vascular: No JVD.   Cardiovascular:      Rate and Rhythm: Normal rate and regular rhythm.      Heart sounds: No murmur heard.    No friction rub.   Pulmonary:      Effort: Pulmonary effort is normal. No respiratory distress.      Breath sounds: Wheezing present. No rales.   Abdominal:      General: Abdomen is flat. Bowel sounds are normal. There is no distension.      Tenderness: There is no abdominal tenderness.   Skin:            Comments: ICD scar    Neurological:      Mental Status: He is alert and oriented to person, place, and  time.       Significant Labs: CMP   Recent Labs   Lab 05/24/22  1237   *   K 4.7   CL 81*   CO2 26   *   BUN 37*   CREATININE 3.1*   CALCIUM 9.9   ANIONGAP 13   ESTGFRAFRICA 28.2*   EGFRNONAA 24.4*   , CBC   Recent Labs   Lab 05/24/22  1237   WBC 8.52   HGB 15.0   HCT 43.1      , Troponin No results for input(s): TROPONINI in the last 48 hours., and All pertinent lab results from the last 24 hours have been reviewed.    Significant Imaging: Echocardiogram: 2D echo with color flow doppler: No results found for this or any previous visit. and Transthoracic echo (TTE) complete (Cupid Only):   Results for orders placed or performed during the hospital encounter of 04/12/22   Echo   Result Value Ref Range    BSA 2.23 m2    TDI SEPTAL 0.06 m/s    LV LATERAL E/E' RATIO 11.00 m/s    LV SEPTAL E/E' RATIO 18.33 m/s    LA WIDTH 4.80 cm    TDI LATERAL 0.10 m/s    LVIDd 7.30 (A) 3.5 - 6.0 cm    IVS 0.61 0.6 - 1.1 cm    Posterior Wall 0.67 0.6 - 1.1 cm    LVIDs 6.74 (A) 2.1 - 4.0 cm    FS 8 28 - 44 %    LA volume 91.22 cm3    Sinus 3.01 cm    STJ 2.15 cm    Ascending aorta 2.62 cm    LV mass 202.45 g    LA size 4.07 cm    RVDD 5.60 cm    TAPSE 2.22 cm    RV S' 8.43 cm/s    Left Ventricle Relative Wall Thickness 0.18 cm    AV mean gradient 2 mmHg    AV valve area 1.68 cm2    AV Velocity Ratio 0.57     AV index (prosthetic) 0.52     Mean e' 0.08 m/s    LVOT diameter 2.03 cm    LVOT area 3.2 cm2    LVOT peak carloz 0.60 m/s    LVOT peak VTI 8.82 cm    Ao peak carloz 1.05 m/s    Ao VTI 17.01 cm    Mr max carloz 0.04 m/s    LVOT stroke volume 28.53 cm3    AV peak gradient 4 mmHg    E/E' ratio 13.75 m/s    MV Peak E Carloz 1.10 m/s    TR Max Carloz 2.80 m/s    LV Systolic Volume 234.79 mL    LV Systolic Volume Index 105.3 mL/m2    LV Diastolic Volume 280.81 mL    LV Diastolic Volume Index 125.92 mL/m2    LA Volume Index 40.9 mL/m2    LV Mass Index 91 g/m2    RA Major Axis 6.04 cm    Left Atrium Minor Axis 5.03 cm    Left Atrium  Major Axis 6.05 cm    Triscuspid Valve Regurgitation Peak Gradient 31 mmHg    LA Volume Index (Mod) 37.8 mL/m2    LA volume (mod) 84.34 cm3    RA Width 2.63 cm    Right Atrial Pressure (from IVC) 3 mmHg    EF 15 %    TV rest pulmonary artery pressure 34 mmHg    Narrative    · The left ventricle is severely enlarged with severely decreased systolic   function. The estimated ejection fraction is 15%.  · Severe right ventricular enlargement with moderately reduced right   ventricular systolic function.  · Left ventricular diastolic dysfunction.  · Mild left atrial enlargement.  · Moderate mitral regurgitation.  · Mild tricuspid regurgitation.  · The estimated PA systolic pressure is 34 mmHg.  · Normal central venous pressure (3 mmHg).

## 2022-05-24 NOTE — HPI
Mr. Kevan Queen is a 37 y.o. athletic  male with a past medical history remarkable for recently diagnosed NICM admitted with ADHF/cardiogenic shock on home milrinone. He presented with symptoms of feeling weak and fatigued. He is not measuring his blood glucose at home (doesn't have glucometer at home). His vitals was low normal in his blood pressure. Of note, he presented to [place] where received IV diuretics. He denies chest pain, dyspnea on exertion or loss of consciousness. He is establish care with Endocrinology for better diabetes control given HbA1c of 9.2% 5/20/2022. His HF medication are Entresto 49-51 BID, Aldactone 25 daily, and digoxin 0.125 mg daily, milrinone at 0.375 mcg/kg/min. Per note, he was approved for VAD (not [facility] due to smoking until admission) 4/20/2022. He has issues with shoulder being frozen at the location of ICD placement.

## 2022-05-24 NOTE — ASSESSMENT & PLAN NOTE
ROC on CKD stage 3  On presentation. Baseline around 2. Presents with creatinine of 3. Rpt labs is 2.8   Will cont gentle hydration

## 2022-05-24 NOTE — H&P
[facility] - Emergency Dept  Heart Transplant  H&P    Patient Name: Kevan Queen  MRN: [id number]  Admission Date: 5/24/2022  Attending Physician: Yumiko Awad MD  Primary Care Provider: ORALIA Cline  Principal Problem:<principal problem not specified>    Subjective:     History of Present Illness:  37 y.o. athletic  male with a past medical history remarkable for recently diagnosed NICM admitted with ADHF/cardiogenic shock on home milrinone. He presented with symptoms of feeling weak and fatigued. He is not measuring his blood glucose at home (doesn't have glucometer at home). His vitals was low normal in his blood pressure. Of note, he presented to [place] where received IV diuretics. He denies chest pain, dyspnea on exertion or loss of consciousness. He is establish care with Endocrinology for better diabetes control given HbA1c of 9.2% 5/20/2022. His HF medication are Entresto 49-51 BID, Aldactone 25 daily, and digoxin 0.125 mg daily, milrinone at 0.375 mcg/kg/min. Per note, he was approved for VAD (not [facility] due to smoking until admission) 4/20/2022. He has issues with shoulder being frozen at the location of ICD placement.       Past Medical History:   Diagnosis Date    Arthritis     Cardiomyopathy     CHF (congestive heart failure) 10/01/2020    Dilated cardiomyopathy 10/26/2020    Drug abuse 10/2020    Renal disorder        Past Surgical History:   Procedure Laterality Date    CARDIAC DEFIBRILLATOR PLACEMENT      RIGHT HEART CATHETERIZATION Right 4/8/2022    Procedure: INSERTION, CATHETER, RIGHT HEART;  Surgeon: Luca Lopez Jr., MD;  Location: [facility] CATH LAB;  Service: Cardiology;  Laterality: Right;    RIGHT HEART CATHETERIZATION Right 4/19/2022    Procedure: INSERTION, CATHETER, RIGHT HEART;  Surgeon: Josh Pulido MD;  Location: [facility] CATH LAB;  Service: Cardiology;  Laterality: Right;       Review of patient's allergies indicates:   Allergen Reactions  "   Bumex [bumetanide] Hives    Lactose Diarrhea     Other reaction(s): Abdominal distension, gaseous    Torsemide Hives       Current Facility-Administered Medications   Medication    dextrose 10 % infusion    dextrose 10 % infusion    dextrose 10% bolus 125 mL    dextrose 10% bolus 250 mL    DOBUtamine 1000 mg in D5W 250 mL infusion    insulin regular in 0.9 % NaCl 100 unit/100 mL (1 unit/mL) infusion    sodium chloride 0.9% bolus 500 mL    sodium chloride 0.9% flush 10 mL    sodium chloride 0.9% flush 10 mL     Current Outpatient Medications   Medication Sig    albuterol (PROVENTIL/VENTOLIN HFA) 90 mcg/actuation inhaler INHALE 2 PUFFS BY MOUTH EVERY 4 HOURS AS NEEDED FOR COUGH OR WHEEZING    aspirin 81 MG Chew Take 81 mg by mouth once daily.    BD GARTH 2ND GEN PEN NEEDLE 32 gauge x 5/32" Ndle     busPIRone (BUSPAR) 7.5 MG tablet Take 7.5 mg by mouth every 12 (twelve) hours.    digoxin (LANOXIN) 125 mcg tablet Take 1 tablet (0.125 mg total) by mouth once daily.    EScitalopram oxalate (LEXAPRO) 5 MG Tab Take 1 tablet (5 mg total) by mouth once daily.    ferrous sulfate 325 (65 FE) MG EC tablet Take 1 tablet (325 mg total) by mouth once daily. (Patient not taking: Reported on 5/24/2022)    furosemide (LASIX) 80 MG tablet Take 80 mg by mouth 2 (two) times daily.    insulin (LANTUS SOLOSTAR U-100 INSULIN) glargine 100 units/mL (3mL) SubQ pen Inject 10 Units into the skin every evening. (Patient not taking: Reported on 5/24/2022)    milrinone 20mg/100ml D5W, 200mcg/ml, (PRIMACOR) 20 mg/100 mL (200 mcg/mL) infusion Inject 34.875 mcg/min into the vein continuous.    mirtazapine (REMERON) 15 MG tablet Take 15 mg by mouth nightly.    pantoprazole (PROTONIX) 40 MG tablet Take 1 tablet (40 mg total) by mouth once daily.    prochlorperazine (COMPAZINE) 5 MG tablet Take 5 mg by mouth every 6 (six) hours as needed.    promethazine (PHENERGAN) 12.5 MG Tab Take 12.5 mg by mouth every 6 (six) hours as " needed.    sacubitriL-valsartan (ENTRESTO) 49-51 mg per tablet Take 1 tablet by mouth 2 (two) times daily.    spironolactone (ALDACTONE) 25 MG tablet Take 1 tablet (25 mg total) by mouth once daily.    sucralfate (CARAFATE) 1 gram tablet Take 1 g by mouth nightly.    traMADoL (ULTRAM) 50 mg tablet Take 50 mg by mouth every 6 (six) hours as needed.     Family History       Problem Relation (Age of Onset)    Diverticulosis Brother    Heart attack Maternal Grandmother, Maternal Grandfather    Heart failure Father          Tobacco Use    Smoking status: Former Smoker     Packs/day: 0.50     Years: 16.00     Pack years: 8.00     Start date: 10/1/2004     Quit date: 10/1/2020     Years since quittin.6    Smokeless tobacco: Never Used   Substance and Sexual Activity    Alcohol use: Not Currently    Drug use: Not Currently     Types: Marijuana, MDMA (Ecstacy)    Sexual activity: Yes     Partners: Female     Birth control/protection: None     Review of Systems   Constitutional:  Positive for fatigue.   HENT: Negative.     Eyes: Negative.    Respiratory: Negative.     Cardiovascular: Negative.    Gastrointestinal: Negative.    Endocrine: Negative.    Genitourinary: Negative.    Musculoskeletal: Negative.    Neurological:  Positive for weakness.   Hematological: Negative.    Psychiatric/Behavioral: Negative.     Objective:     Vital Signs (Most Recent):  Temp: 98.3 °F (36.8 °C) (22 1354)  Pulse: 92 (22 1539)  Resp: 10 (22 1539)  BP: 125/69 (22 1524)  SpO2: 97 % (22 1539) Vital Signs (24h Range):  Temp:  [98.3 °F (36.8 °C)] 98.3 °F (36.8 °C)  Pulse:  [] 92  Resp:  [10-18] 10  SpO2:  [97 %] 97 %  BP: ()/(60-69) 125/69     Patient Vitals for the past 72 hrs (Last 3 readings):   Weight   22 1354 95.3 kg (210 lb 1.6 oz)     Body mass index is 26.26 kg/m².    No intake or output data in the 24 hours ending 22 1728    Physical Exam  Constitutional:       Appearance:  Normal appearance.   HENT:      Head: Normocephalic.      Mouth/Throat:      Mouth: Mucous membranes are dry.   Eyes:      Extraocular Movements: Extraocular movements intact.      Pupils: Pupils are equal, round, and reactive to light.   Cardiovascular:      Rate and Rhythm: Normal rate and regular rhythm.   Pulmonary:      Effort: Pulmonary effort is normal.      Breath sounds: Normal breath sounds.   Abdominal:      General: Abdomen is flat. Bowel sounds are normal.      Palpations: Abdomen is soft.   Musculoskeletal:         General: Normal range of motion.      Cervical back: Normal range of motion.   Skin:     General: Skin is warm and dry.      Capillary Refill: Capillary refill takes less than 2 seconds.   Neurological:      General: No focal deficit present.      Mental Status: He is alert.       Significant Labs:  CBC:  Recent Labs   Lab 05/20/22  1203 05/24/22  1237 05/24/22  1607   WBC 7.5 8.52 8.50   RBC 5.06 4.93 4.71   HGB 15.3 15.0 14.4   HCT 45.8 43.1 41.1    323 305   MCV 90.5 87 87   MCH 30.2 30.4 30.6   MCHC 33.4 34.8 35.0     BNP:  Recent Labs   Lab 05/24/22  1237   BNP 49     CMP:  Recent Labs   Lab 05/20/22  1203 05/24/22  1237 05/24/22  1607   GLU  --  853* 784*   CALCIUM 10.7* 9.9 10.1   ALBUMIN 4.4  --  4.2   PROT  --   --  8.5*   * 120* 120*   K 4.0 4.7 4.3   CO2 28 26 23   CL  --  81* 83*   BUN 18.7 37* 36*   CREATININE 2.02* 3.1* 2.8*   ALKPHOS 103  --  110   ALT 32  --  26   AST 29  --  20   BILITOT 0.8  --  0.7      Coagulation:   No results for input(s): PT, INR, APTT in the last 168 hours.  LDH:  No results for input(s): LDH in the last 72 hours.  Microbiology:  Microbiology Results (last 7 days)       ** No results found for the last 168 hours. **            I have reviewed all pertinent labs within the past 24 hours.    Diagnostic Results:  I have reviewed and interpreted all pertinent imaging results/findings within the past 24 hours.    Assessment/Plan:     [facility]   -   Presents with elevated sugars   - Corrected Sodium is 135.  - Will use DKA protocol for repleting electrolytes (K and Phos)  -  gentle 0.9 NaCl bolus given his HFrEF   - Hold Entresto, Spironolactone and Dig given ROC  - BMP Q 6 hours while on insulin gtt for DKA    - Switch his Milirone to  at lower dose in one hour (to avoid accumulation of Milirone)   - Consult endocrine for further recommendation on Type I insulin   - Device check (ordered) [place] given his pain in the left shoulder     Chronic combined systolic and diastolic congestive heart failure  On GDMT along with Milrinone. SBO above 110 in ER. He appears dry on Exam.  Milrinone held at 4.30  and start Dobutamine at 6 pm         Joaquín Guajardo MD  Heart Transplant  [facility] - Emergency Dept

## 2022-05-24 NOTE — CONSULTS
[facility] - Emergency Dept  Cardiology  Consult Note    Patient Name: Kevan Queen  MRN: [id number]  Admission Date: 5/24/2022  Hospital Length of Stay: 0 days  Code Status: Prior   Attending Provider: Yumiko Awad MD   Consulting Provider: Alan Lynn MD  Primary Care Physician: ORALIA Cline  Principal Problem:<principal problem not specified>    Patient information was obtained from patient and ER records.     Inpatient consult to Heart Transplant  Consult performed by: Alan Lynn MD  Consult ordered by: Yumiko Awad MD        Subjective:     Chief Complaint:  Dehydration     HPI:   Mr. Kevan Queen is a 37 y.o. athletic  male with a past medical history remarkable for recently diagnosed NICM admitted with ADHF/cardiogenic shock on home milrinone. He presented with symptoms of feeling weak and fatigued. He is not measuring his blood glucose at home (doesn't have glucometer at home). His vitals was low normal in his blood pressure. Of note, he presented to [facility] where received IV diuretics. He denies chest pain, dyspnea on exertion or loss of consciousness. He is establish care with Endocrinology for better diabetes control given HbA1c of 9.2% 5/20/2022. His HF medication are Entresto 49-51 BID, Aldactone 25 daily, and digoxin 0.125 mg daily, milrinone at 0.375 mcg/kg/min. Per note, he was approved for VAD (not Soren due to smoking until admission) 4/20/2022. He has issues with shoulder being frozen at the location of ICD placement.       Past Medical History:   Diagnosis Date    Arthritis     Cardiomyopathy     CHF (congestive heart failure) 10/01/2020    Dilated cardiomyopathy 10/26/2020    Drug abuse 10/2020    Renal disorder        Past Surgical History:   Procedure Laterality Date    CARDIAC DEFIBRILLATOR PLACEMENT      RIGHT HEART CATHETERIZATION Right 4/8/2022    Procedure: INSERTION, CATHETER, RIGHT HEART;  Surgeon: Luca Lopez Jr., MD;   "Location: [facility] CATH LAB;  Service: Cardiology;  Laterality: Right;    RIGHT HEART CATHETERIZATION Right 4/19/2022    Procedure: INSERTION, CATHETER, RIGHT HEART;  Surgeon: Josh Pulido MD;  Location: [facility] CATH LAB;  Service: Cardiology;  Laterality: Right;       Review of patient's allergies indicates:   Allergen Reactions    Bumex [bumetanide] Hives    Lactose Diarrhea     Other reaction(s): Abdominal distension, gaseous    Torsemide Hives       No current facility-administered medications on file prior to encounter.     Current Outpatient Medications on File Prior to Encounter   Medication Sig    albuterol (PROVENTIL/VENTOLIN HFA) 90 mcg/actuation inhaler INHALE 2 PUFFS BY MOUTH EVERY 4 HOURS AS NEEDED FOR COUGH OR WHEEZING    aspirin 81 MG Chew Take 81 mg by mouth once daily.    BD GARTH 2ND GEN PEN NEEDLE 32 gauge x 5/32" Ndle     busPIRone (BUSPAR) 7.5 MG tablet Take 7.5 mg by mouth every 12 (twelve) hours.    digoxin (LANOXIN) 125 mcg tablet Take 1 tablet (0.125 mg total) by mouth once daily.    EScitalopram oxalate (LEXAPRO) 5 MG Tab Take 1 tablet (5 mg total) by mouth once daily.    ferrous sulfate 325 (65 FE) MG EC tablet Take 1 tablet (325 mg total) by mouth once daily. (Patient not taking: Reported on 5/24/2022)    furosemide (LASIX) 80 MG tablet Take 80 mg by mouth 2 (two) times daily.    insulin (LANTUS SOLOSTAR U-100 INSULIN) glargine 100 units/mL (3mL) SubQ pen Inject 10 Units into the skin every evening. (Patient not taking: Reported on 5/24/2022)    milrinone 20mg/100ml D5W, 200mcg/ml, (PRIMACOR) 20 mg/100 mL (200 mcg/mL) infusion Inject 34.875 mcg/min into the vein continuous.    mirtazapine (REMERON) 15 MG tablet Take 15 mg by mouth nightly.    pantoprazole (PROTONIX) 40 MG tablet Take 1 tablet (40 mg total) by mouth once daily.    prochlorperazine (COMPAZINE) 5 MG tablet Take 5 mg by mouth every 6 (six) hours as needed.    promethazine (PHENERGAN) 12.5 MG Tab Take 12.5 " mg by mouth every 6 (six) hours as needed.    sacubitriL-valsartan (ENTRESTO) 49-51 mg per tablet Take 1 tablet by mouth 2 (two) times daily.    spironolactone (ALDACTONE) 25 MG tablet Take 1 tablet (25 mg total) by mouth once daily.    sucralfate (CARAFATE) 1 gram tablet Take 1 g by mouth nightly.    traMADoL (ULTRAM) 50 mg tablet Take 50 mg by mouth every 6 (six) hours as needed.    [DISCONTINUED] metOLazone (ZAROXOLYN) 2.5 MG tablet Take 2.5 mg by mouth every other day.    [DISCONTINUED] metoprolol succinate (TOPROL-XL) 25 MG 24 hr tablet TAKE 1 TABLET(25 MG) BY MOUTH EVERY DAY     Family History       Problem Relation (Age of Onset)    Diverticulosis Brother    Heart attack Maternal Grandmother, Maternal Grandfather    Heart failure Father          Tobacco Use    Smoking status: Former Smoker     Packs/day: 0.50     Years: 16.00     Pack years: 8.00     Start date: 10/1/2004     Quit date: 10/1/2020     Years since quittin.6    Smokeless tobacco: Never Used   Substance and Sexual Activity    Alcohol use: Not Currently    Drug use: Not Currently     Types: Marijuana, MDMA (Ecstacy)    Sexual activity: Yes     Partners: Female     Birth control/protection: None     Review of Systems   Constitutional: Positive for decreased appetite, diaphoresis and malaise/fatigue. Negative for chills.   HENT:  Negative for congestion.    Cardiovascular:  Negative for chest pain, irregular heartbeat and leg swelling.   Respiratory:  Negative for cough and shortness of breath.    Endocrine: Negative for cold intolerance and heat intolerance.   Skin:  Negative for rash.   Musculoskeletal:  Negative for arthritis and back pain.   Gastrointestinal:  Positive for abdominal pain. Negative for constipation and diarrhea.   Genitourinary:  Negative for dysuria and hematuria.   Neurological:  Positive for difficulty with concentration, headaches and light-headedness. Negative for dizziness, loss of balance and sensory change.    Psychiatric/Behavioral:  Negative for altered mental status.    Objective:     Vital Signs (Most Recent):  Temp: 98.3 °F (36.8 °C) (05/24/22 1354)  Pulse: 92 (05/24/22 1539)  Resp: 10 (05/24/22 1539)  BP: 125/69 (05/24/22 1524)  SpO2: 97 % (05/24/22 1539) Vital Signs (24h Range):  Temp:  [98.3 °F (36.8 °C)] 98.3 °F (36.8 °C)  Pulse:  [] 92  Resp:  [10-18] 10  SpO2:  [97 %] 97 %  BP: ()/(60-69) 125/69     Weight: 95.3 kg (210 lb 1.6 oz)  Body mass index is 26.26 kg/m².    SpO2: 97 %       No intake or output data in the 24 hours ending 05/24/22 1621    Lines/Drains/Airways       Peripherally Inserted Central Catheter Line  Duration             PICC Double Lumen 11/01/21 right brachial 204 days                    Physical Exam  Vitals reviewed.   Constitutional:       General: He is not in acute distress.     Appearance: He is ill-appearing.   HENT:      Head: Normocephalic.   Eyes:      Pupils: Pupils are equal, round, and reactive to light.   Neck:      Thyroid: No thyromegaly.      Vascular: No JVD.   Cardiovascular:      Rate and Rhythm: Normal rate and regular rhythm.      Heart sounds: No murmur heard.    No friction rub.   Pulmonary:      Effort: Pulmonary effort is normal. No respiratory distress.      Breath sounds: Wheezing present. No rales.   Abdominal:      General: Abdomen is flat. Bowel sounds are normal. There is no distension.      Tenderness: There is no abdominal tenderness.   Skin:            Comments: ICD scar    Neurological:      Mental Status: He is alert and oriented to person, place, and time.       Significant Labs: CMP   Recent Labs   Lab 05/24/22  1237   *   K 4.7   CL 81*   CO2 26   *   BUN 37*   CREATININE 3.1*   CALCIUM 9.9   ANIONGAP 13   ESTGFRAFRICA 28.2*   EGFRNONAA 24.4*   , CBC   Recent Labs   Lab 05/24/22  1237   WBC 8.52   HGB 15.0   HCT 43.1      , Troponin No results for input(s): TROPONINI in the last 48 hours., and All pertinent lab results  from the last 24 hours have been reviewed.    Significant Imaging: Echocardiogram: 2D echo with color flow doppler: No results found for this or any previous visit. and Transthoracic echo (TTE) complete (Cupid Only):   Results for orders placed or performed during the hospital encounter of 04/12/22   Echo   Result Value Ref Range    BSA 2.23 m2    TDI SEPTAL 0.06 m/s    LV LATERAL E/E' RATIO 11.00 m/s    LV SEPTAL E/E' RATIO 18.33 m/s    LA WIDTH 4.80 cm    TDI LATERAL 0.10 m/s    LVIDd 7.30 (A) 3.5 - 6.0 cm    IVS 0.61 0.6 - 1.1 cm    Posterior Wall 0.67 0.6 - 1.1 cm    LVIDs 6.74 (A) 2.1 - 4.0 cm    FS 8 28 - 44 %    LA volume 91.22 cm3    Sinus 3.01 cm    STJ 2.15 cm    Ascending aorta 2.62 cm    LV mass 202.45 g    LA size 4.07 cm    RVDD 5.60 cm    TAPSE 2.22 cm    RV S' 8.43 cm/s    Left Ventricle Relative Wall Thickness 0.18 cm    AV mean gradient 2 mmHg    AV valve area 1.68 cm2    AV Velocity Ratio 0.57     AV index (prosthetic) 0.52     Mean e' 0.08 m/s    LVOT diameter 2.03 cm    LVOT area 3.2 cm2    LVOT peak carloz 0.60 m/s    LVOT peak VTI 8.82 cm    Ao peak carloz 1.05 m/s    Ao VTI 17.01 cm    Mr max carloz 0.04 m/s    LVOT stroke volume 28.53 cm3    AV peak gradient 4 mmHg    E/E' ratio 13.75 m/s    MV Peak E Carloz 1.10 m/s    TR Max Carloz 2.80 m/s    LV Systolic Volume 234.79 mL    LV Systolic Volume Index 105.3 mL/m2    LV Diastolic Volume 280.81 mL    LV Diastolic Volume Index 125.92 mL/m2    LA Volume Index 40.9 mL/m2    LV Mass Index 91 g/m2    RA Major Axis 6.04 cm    Left Atrium Minor Axis 5.03 cm    Left Atrium Major Axis 6.05 cm    Triscuspid Valve Regurgitation Peak Gradient 31 mmHg    LA Volume Index (Mod) 37.8 mL/m2    LA volume (mod) 84.34 cm3    RA Width 2.63 cm    Right Atrial Pressure (from IVC) 3 mmHg    EF 15 %    TV rest pulmonary artery pressure 34 mmHg    Narrative    · The left ventricle is severely enlarged with severely decreased systolic   function. The estimated ejection fraction is  15%.  · Severe right ventricular enlargement with moderately reduced right   ventricular systolic function.  · Left ventricular diastolic dysfunction.  · Mild left atrial enlargement.  · Moderate mitral regurgitation.  · Mild tricuspid regurgitation.  · The estimated PA systolic pressure is 34 mmHg.  · Normal central venous pressure (3 mmHg).        Assessment and Plan:     DKA (diabetic ketoacidosis)   - Hyperosmolar state most liekly DKA given his Type I DM   - Hold diuretics and gentle 0.9 NaCl bolus given his HFrEF   - Hold Entresto, Spironolactone and Dig given ROC  - BMP Q 6 hours while on insulin gtt for DKA    - Switch his Milirone to  at lower dose in one hour (to avoid accumulation of Milirone)   - Consult endocrine for further recommendation on Type I insulin   - Device check (ordered) [employer] given his pain in the left shoulder     VTE Risk Mitigation (From admission, onward)         Ordered     IP VTE HIGH RISK PATIENT  Once         05/24/22 1619     Place sequential compression device  Until discontinued         05/24/22 1619     Place sequential compression device  Until discontinued         05/24/22 1551                Thank you for your consult. I will follow-up with patient. Please contact us if you have any additional questions.    Alan Lynn MD  Cardiology   [facility]

## 2022-05-24 NOTE — HPI
37 y.o. athletic  male with a past medical history remarkable for recently diagnosed NICM admitted with ADHF/cardiogenic shock on home milrinone. He presented with symptoms of feeling weak and fatigued. He is not measuring his blood glucose at home (doesn't have glucometer at home). His vitals was low normal in his blood pressure. Of note, he presented to [facility] where received IV diuretics. He denies chest pain, dyspnea on exertion or loss of consciousness. He is establish care with Endocrinology for better diabetes control given HbA1c of 9.2% 5/20/2022. His HF medication are Entresto 49-51 BID, Aldactone 25 daily, and digoxin 0.125 mg daily, milrinone at 0.375 mcg/kg/min. Per note, he was approved for VAD (not Soren due to smoking until admission) 4/20/2022. He has issues with shoulder being frozen at the location of ICD placement.    DISPLAY PLAN FREE TEXT

## 2022-05-24 NOTE — PROGRESS NOTES
ADVANCED HEART FAILURE AND TRANSPLANTATION CLINIC VISIT    CHIEF COMPLAINT:  Follow-up heart failure    HISTORY OF PRESENT ILLNESS:  Kevan Queen is a 37 y.o. athletic  male with a past medical history remarkable for recently diagnosed NICM admitted with ADHF/cardiogenic shock on home milrinone.    Co-morbidities include history of polysubstance abuse including recent smoking and cannabis use    He was approved for VAD (not Soren due to smoking until admission) 4/20/2022 and has followed in clinic during hospital admission for decompensated HF. He was discharged on Entresto 49-51 BID, Aldactone 25 daily, and digoxin 0.125 mg daily, milrinone at 0.375 mcg/kg/min and 80 mg PO lasix BID.  Last visit was a virtual visit 4/29 at which time no changes were made to medications.    Since then, he was ok but yesterday he felt unwell and he was given metolazone 2.5 x 1 at his local hospital two days ago and felt dehydrated and fatigued. Feels nauseated and also has a frozen shoulder with significant pain and at ER was told to worry about it after his VAD surgery. Has had issues with frozen shoulder since ICD placement after he restarted moving his arm after 1.5 months after placement with progressive pain.    5/20/2022 labs show glucose 598 with HbA1c 9.2% when he presented to the ER. He was told had prediabetes prior to this encounter. He was started on insulin and discharged.    BP at this visit difficult to obtain and faint but with manual was able to get 90/60 and orthostatics not done due to symptoms.    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 He states he felt he went down the wrong path and notes a history of cocaine use about once every 6 months for 2 years and quit in 2020, though admits he did have a positive cocaine screen since then once about a year ago. He primarily used ecstasy regularly and quit this in 2020. He does admit to smoking about a 1/2 PPD up until admission and his fiancee also smokes in the house. He continues to use marijuana, primarily for appetite, insomnia and pain. His appetite has however improved. He has a history of drug overdose x 3 without clear details but states he was back from the "brink of death". He also has a history of incarceration x 5 years.         He was diagnosed with HF in September/October 2020 which him and his fiance describe as something out of the blue. He had no functional limitations until he noted SOB/RAMIREZ about 1 month prior to diagnosis. He was initially seen and treated for bronchitis and pneumonia without improvement but after seen in [facility], diagnosed with HF and admitted in the setting of ROC, shock in the ICU but felt he was discharged too early that admit and 1 day after discharge came to [facility] and admitted for ADHF from 10/25-10/30/2020 at which time he was assessed by our HTS team.          He was been followed in clinic and underwent RHC 4/8/2022 with biventricular elevated filling pressures and echo showing EF 10%, LVEDD 70 mm, severe RVE and RVD with TAPSE 1.4, severe MR and subsequently admitted and discharged 4/25/2022 after aggressive diuresis. Advanced pathway was started    Cardiac Data:  Transthoracic Echo 4/19/2022: LVEDD 73 mm, TAPSE reported at 2.2, E/e' 13.8, LVOT TVI 8.8  · The left ventricle is severely enlarged with severely decreased systolic function. The estimated ejection fraction is 15%.  · Severe right " ventricular enlargement with moderately reduced right ventricular systolic function.  · Left ventricular diastolic dysfunction.  · Mild left atrial enlargement.  · Moderate mitral regurgitation.  · Mild tricuspid regurgitation.  · The estimated PA systolic pressure is 34 mmHg.  · Normal central venous pressure (3 mmHg).     ECG 2022: SR 93 bpm, LAD, LAE, QRS 96 ms    Left Heart Catheterization:     Devices: single chamber ICD 2022    PAST MEDICAL HISTORY:  Past Medical History:   Diagnosis Date    Arthritis     Cardiomyopathy     CHF (congestive heart failure) 10/01/2020    Dilated cardiomyopathy 10/26/2020    Drug abuse 10/2020    Renal disorder        PAST SURGICAL HISTORY:  Past Surgical History:   Procedure Laterality Date    CARDIAC DEFIBRILLATOR PLACEMENT      RIGHT HEART CATHETERIZATION Right 2022    Procedure: INSERTION, CATHETER, RIGHT HEART;  Surgeon: Luca Lopez Jr., MD;  Location: [facility] CATH LAB;  Service: Cardiology;  Laterality: Right;    RIGHT HEART CATHETERIZATION Right 2022    Procedure: INSERTION, CATHETER, RIGHT HEART;  Surgeon: Josh Pulido MD;  Location: [facility] CATH LAB;  Service: Cardiology;  Laterality: Right;       SOCIAL HISTORY  Social History     Socioeconomic History    Marital status: Legally    Tobacco Use    Smoking status: Former Smoker     Packs/day: 0.50     Years: 16.00     Pack years: 8.00     Start date: 10/1/2004     Quit date: 2021     Years since quittin.0    Smokeless tobacco: Never Used   Substance and Sexual Activity    Alcohol use: Not Currently    Drug use: Not Currently     Types: Marijuana, MDMA (Ecstacy)    Sexual activity: Yes     Partners: Female     Birth control/protection: None     FAMILY HISTORY  Family History   Problem Relation Age of Onset    Heart failure Father     Diverticulosis Brother     Heart attack Maternal Grandmother     Heart attack Maternal Grandfather          ALLERGIES:  Review  of patient's allergies indicates:   Allergen Reactions    Bumex [bumetanide] Hives    Lactose Diarrhea     Other reaction(s): Abdominal distension, gaseous    Torsemide Hives       MEDICATIONS  Current Outpatient Medications on File Prior to Visit   Medication Sig Dispense Refill    albuterol (PROVENTIL/VENTOLIN HFA) 90 mcg/actuation inhaler INHALE 2 PUFFS BY MOUTH EVERY 4 HOURS AS NEEDED FOR COUGH OR WHEEZING      aspirin 81 MG Chew Take 81 mg by mouth once daily.      busPIRone (BUSPAR) 7.5 MG tablet Take 7.5 mg by mouth every 12 (twelve) hours.      digoxin (LANOXIN) 125 mcg tablet Take 1 tablet (0.125 mg total) by mouth once daily. 30 tablet 11    EScitalopram oxalate (LEXAPRO) 5 MG Tab Take 1 tablet (5 mg total) by mouth once daily. 30 tablet 11    ferrous sulfate 325 (65 FE) MG EC tablet Take 1 tablet (325 mg total) by mouth once daily. (Patient not taking: Reported on 4/29/2022) 90 tablet 0    furosemide (LASIX) 80 MG tablet Take 80 mg by mouth 2 (two) times daily.      insulin (LANTUS SOLOSTAR U-100 INSULIN) glargine 100 units/mL (3mL) SubQ pen Inject 10 Units into the skin every evening. 3 mL 11    milrinone 20mg/100ml D5W, 200mcg/ml, (PRIMACOR) 20 mg/100 mL (200 mcg/mL) infusion Inject 34.875 mcg/min into the vein continuous.      mirtazapine (REMERON) 15 MG tablet Take 15 mg by mouth nightly.      pantoprazole (PROTONIX) 40 MG tablet Take 1 tablet (40 mg total) by mouth once daily. 30 tablet 11    prochlorperazine (COMPAZINE) 5 MG tablet Take 5 mg by mouth every 6 (six) hours as needed.      promethazine (PHENERGAN) 12.5 MG Tab Take 12.5 mg by mouth every 6 (six) hours as needed.      sacubitriL-valsartan (ENTRESTO) 49-51 mg per tablet Take 1 tablet by mouth 2 (two) times daily. 30 tablet 11    spironolactone (ALDACTONE) 25 MG tablet Take 1 tablet (25 mg total) by mouth once daily. 30 tablet 11    sucralfate (CARAFATE) 1 gram tablet Take 1 g by mouth nightly.      traMADoL (ULTRAM) 50  "mg tablet Take 50 mg by mouth every 6 (six) hours as needed.      [DISCONTINUED] metOLazone (ZAROXOLYN) 2.5 MG tablet Take 2.5 mg by mouth every other day.      [DISCONTINUED] metoprolol succinate (TOPROL-XL) 25 MG 24 hr tablet TAKE 1 TABLET(25 MG) BY MOUTH EVERY DAY 30 tablet 0     No current facility-administered medications on file prior to visit.       REVIEW OF SYSTEMS  Review of Systems   Constitutional: Positive for activity change, appetite change and fatigue.   Respiratory: Negative for chest tightness and shortness of breath.    Cardiovascular: Positive for palpitations. Negative for chest pain and leg swelling.   Gastrointestinal: Positive for nausea. Negative for blood in stool and vomiting.   Genitourinary: Negative for difficulty urinating and hematuria.   Neurological: Positive for light-headedness. Negative for syncope.   Hematological: Does not bruise/bleed easily.   All other systems reviewed and are negative.      PHYSICAL EXAM:  90/60 BP pulse difficult to   Vitals:    05/24/22 1252   Weight: 95.3 kg (210 lb 1.6 oz)   Height: 6' 3" (1.905 m)    Body mass index is 26.26 kg/m².    GENERAL: Alert, uncomfortable, diaphoretic  HEENT: Anicteric sclerae  NECK: JVP not visible above level of clavicle while sitting upright  CARDIOVASCULAR: Regular rate and rhythm. Normal S1, S2 no murmurs, rubs or gallops.  PULMONARY: Lungs clear to auscultation bilaterally  ABDOMEN: Soft, nontender, nondistended. No guarding, no rebound, no hepatomegaly  EXTREMITIES: Warm and well-perfused. No clubbing, cyanosis or edema. No pre-sacral edema  NEUROLOGIC: No focal deficits    LABS:    BMP  Lab Results   Component Value Date     (L) 05/20/2022    K 4.0 05/20/2022    CL 98 04/25/2022    CO2 28 05/20/2022    BUN 18.7 05/20/2022    CREATININE 2.02 (H) 05/20/2022    CALCIUM 10.7 (H) 05/20/2022    ANIONGAP 12 04/25/2022    ESTGFRAFRICA 58.3 (A) 04/25/2022    EGFRNONAA 50.4 (A) 04/25/2022       Magnesium   Date " Value Ref Range Status   04/25/2022 2.2 1.6 - 2.6 mg/dL Final       Lab Results   Component Value Date    WBC 7.5 05/20/2022    HGB 15.3 05/20/2022    HCT 45.8 05/20/2022    MCV 90.5 05/20/2022     05/20/2022       Lab Results   Component Value Date    INR 1.1 04/19/2022    INR 1.1 04/12/2022    INR 1.2 09/23/2021    PROTIME 14.6 (H) 09/23/2021    PROTIME 14.4 09/04/2021    PROTIME 11.9 10/25/2020       BNP   Date Value Ref Range Status   04/18/2022 145 (H) 0 - 99 pg/mL Final     Comment:     Values of less than 100 pg/ml are consistent with non-CHF populations.   04/12/2022 798 (H) 0 - 99 pg/mL Final     Comment:     Values of less than 100 pg/ml are consistent with non-CHF populations.   12/01/2020 1,970 (H) 0 - 99 pg/mL Final     Comment:     Values of less than 100 pg/ml are consistent with non-CHF populations.     Natriuretic Peptide   Date Value Ref Range Status   03/24/2022 1,257.1 <=100.0    12/02/2021 1,287.6 (H) <<=100.0 pg/mL Final   11/08/2021 2,882.5 (H) <<=100.0 pg/mL Final       LD   Date Value Ref Range Status   04/18/2022 250 110 - 260 U/L Final     Comment:     Results are increased in hemolyzed samples.       IMPRESSION:    NYHA Class III  ACC/AHA Stage D  Roper profile A    1. Hypotension, unspecified hypotension type    2. Chronic combined systolic and diastolic congestive heart failure    3. Dilated cardiomyopathy    4. ICD (implantable cardioverter-defibrillator) in place    5. Stage 3a chronic kidney disease    6. Tobacco use    7. Anemia of chronic disease    8. Anxiety disorder, unspecified type    9. Type 2 diabetes mellitus with hyperglycemia, without long-term current use of insulin    10. Adhesive capsulitis of shoulder, unspecified laterality        PLAN:    Diaphoretic and uncomfortable with difficult to obtain BP. Recently seen in ER for hyperglycemia with recent diagnosis of DM2 and does not know how to check blood sugars at home. Recommended to go to ER for inpatient  admission and further evaluation    Outpatient needs to see Ortho for left shoulder adhesive capsulitis symptoms following ICD placement in January.      Electronically signed by:   Angela Yang   05/24/2022 11:21 AM

## 2022-05-24 NOTE — LETTER
May 25, 2022        Inderjit Hendricks  [address]  [address]  [place] [address]  Phone: [phone]  Fax: [phone]     Josh Pulido  [address] [address]  Phone: [phone]  Fax: [phone]             [id number]  [address]  [place] [address]  Phone: [phone]   Patient: Kevan Queen   MR Number: [id number]   YOB: 1985   Date of Visit: 5/24/2022       Dear Dr. Inderjit Hendricks, Josh Pulido    Thank you for referring Kevan Queen to me for evaluation. Attached you will find relevant portions of my assessment and plan of care.    If you have questions, please do not hesitate to call me. I look forward to following Kevan Queen along with you.    Sincerely,    Angela Yang, DO    Enclosure    If you would like to receive this communication electronically, please contact [email or web address] or [phone] to request [employer] [facility] access.    [employer] [facility] is a tool which provides read-only access to select patient information with whom you have a relationship. Its easy to use and provides real time access to review your patients record including encounter summaries, notes, results, and demographic information.    If you feel you have received this communication in error or would no longer like to receive these types of communications, please e-mail [email or web address]

## 2022-05-24 NOTE — ASSESSMENT & PLAN NOTE
- Hyperosmolar state most liekly DKA given his Type I DM   - Hold diuretics and gentle bolus given his HFrEF   - Hold Entresto, Spironolactone and Dig given ROC  - BMP Q 6 hours while on insulin gtt for DKA    - Switch his Milirone to  at lower dose in one hour (to avoid accumulation of Milirone)   - Consult endocrine for further recommendation

## 2022-05-24 NOTE — ED PROVIDER NOTES
Encounter Date: 5/24/2022       History     Chief Complaint   Patient presents with    Hypotension     Sent from cards clinic for admission, symptomatic hypotension, on primacor drip     HPI     Is a 37-year-old man with recently diagnosed dilated cardiomyopathy on a continuous infusion of Milrinone, CKD, and recently diagnosed insulin-dependent diabetes, but has not yet been able to start insulin as he does not have needles at home or glucometer, who presents with elevated glucose.  He was seen in cardiology clinic today and was noted to have a lower blood pressure in the 80s, despite Milrinone.  Earlier this week he reports significantly increased urine output, with clear urine, filling up at least 2 gal urine each 24 hour period.  He had an additional dose of diuretics earlier this week as well, has been taking his Lasix as prescribed.  He adheres to a 1.5 L fluid restriction per day.  Review of patient's allergies indicates:   Allergen Reactions    Bumex [bumetanide] Hives    Lactose Diarrhea     Other reaction(s): Abdominal distension, gaseous    Torsemide Hives     Past Medical History:   Diagnosis Date    Arthritis     Cardiomyopathy     CHF (congestive heart failure) 10/01/2020    Dilated cardiomyopathy 10/26/2020    Drug abuse 10/2020    Renal disorder      Past Surgical History:   Procedure Laterality Date    CARDIAC DEFIBRILLATOR PLACEMENT      RIGHT HEART CATHETERIZATION Right 4/8/2022    Procedure: INSERTION, CATHETER, RIGHT HEART;  Surgeon: Luca Lopez Jr., MD;  Location: [facility] CATH LAB;  Service: Cardiology;  Laterality: Right;    RIGHT HEART CATHETERIZATION Right 4/19/2022    Procedure: INSERTION, CATHETER, RIGHT HEART;  Surgeon: Josh Pulido MD;  Location: [facility] CATH LAB;  Service: Cardiology;  Laterality: Right;     Family History   Problem Relation Age of Onset    Heart failure Father     Diverticulosis Brother     Heart attack Maternal Grandmother     Heart attack  Maternal Grandfather      Social History     Tobacco Use    Smoking status: Former Smoker     Packs/day: 0.50     Years: 16.00     Pack years: 8.00     Start date: 10/1/2004     Quit date: 10/1/2020     Years since quittin.6    Smokeless tobacco: Never Used   Substance Use Topics    Alcohol use: Not Currently    Drug use: Not Currently     Types: Marijuana, MDMA (Ecstacy)     Review of Systems   Constitutional: Positive for fatigue. Negative for chills, diaphoresis and fever.   HENT: Negative for congestion, rhinorrhea and sore throat.    Eyes: Negative for visual disturbance.   Respiratory: Negative for cough, chest tightness and shortness of breath.    Cardiovascular: Negative for chest pain.   Gastrointestinal: Negative for abdominal pain, blood in stool, constipation, diarrhea and vomiting.   Endocrine: Positive for polydipsia and polyuria.   Genitourinary: Negative for dysuria, hematuria and urgency.   Musculoskeletal: Negative for back pain.   Skin: Negative for rash.   Neurological: Positive for light-headedness. Negative for seizures and syncope.   Hematological: Does not bruise/bleed easily.   Psychiatric/Behavioral: Negative for agitation and hallucinations.       Physical Exam     Initial Vitals [22 1354]   BP Pulse Resp Temp SpO2   (!) 84/60 100 18 98.3 °F (36.8 °C) 97 %      MAP       --         Physical Exam  Gen: AxOx4, NAD, appears stated age, well appearing  Eye: EOMI, no scleral icterus, no periorbital edema or ecchymosis  Head: Normocephalic, atraumatic, no lesions, scalp grossly normal  ENT: neck supple, no stridor, no masses, no abnormal phonation or drooling  CVS: RUE PICC site c/d/i, warm and well perfused, cap refill <2 seconds, no extremity pallor  PULM: normal work of breathing, no stridor, equal chest rise, no peripheral cyanosis  ABD: scaphoid, nondistended  Ext: no rash, no deformities, moving all joints with normal ROM  Neuro: MARTIN, gait intact, face grossly  symmetric  Psych: well groomed, mood and affect congruent, makes good eye contact, cooperative    ED Course   Procedures  Labs Reviewed   COMPREHENSIVE METABOLIC PANEL - Abnormal; Notable for the following components:       Result Value    Sodium 120 (*)     Chloride 83 (*)     Glucose 784 (*)     BUN 36 (*)     Creatinine 2.8 (*)     Total Protein 8.5 (*)     eGFR if  31.9 (*)     eGFR if non  27.6 (*)     All other components within normal limits   TROPONIN I - Abnormal; Notable for the following components:    Troponin I 0.082 (*)     All other components within normal limits   OSMOLALITY, SERUM - Abnormal; Notable for the following components:    Osmolality 313 (*)     All other components within normal limits   ISTAT PROCEDURE - Abnormal; Notable for the following components:    POC PH 7.323 (*)     POC PCO2 59.4 (*)     POC PO2 29 (*)     POC HCO3 30.8 (*)     POC SATURATED O2 49 (*)     POC TCO2 33 (*)     All other components within normal limits   CBC W/ AUTO DIFFERENTIAL   MAGNESIUM   BETA - HYDROXYBUTYRATE, SERUM   BETA - HYDROXYBUTYRATE, SERUM   SARS-COV-2 RDRP GENE   POCT GLUCOSE MONITORING CONTINUOUS   POCT GLUCOSE MONITORING CONTINUOUS          Imaging Results          X-Ray Chest AP Portable (Final result)  Result time 05/24/22 16:37:01    Final result by Regina Haines MD (05/24/22 16:37:01)                 Impression:      No acute abnormality.    Electronically signed by resident: Yumiko Bryant  Date:    05/24/2022  Time:    16:14    Electronically signed by: Regina Haines  Date:    05/24/2022  Time:    16:37             Narrative:    EXAMINATION:  XR CHEST AP PORTABLE    CLINICAL HISTORY:  Other fatigue    TECHNIQUE:  Single frontal view of the chest was performed.    COMPARISON:  Chest x-ray 04/18/2022 and CT chest 04/12/2022.    FINDINGS:  Left chest wall AICD with stable position of lead.  Right PICC with tip projecting over the SVC.  Cardiac leads overlie the  chest.    The lungs are clear, with normal appearance of pulmonary vasculature and no pleural effusion or pneumothorax.    The cardiac silhouette is unchanged when compared to chest x-ray from 04/18/2022.  The hilar and mediastinal contours are unremarkable.    Bones are intact.                                 Medications   sodium chloride 0.9% flush 10 mL (has no administration in time range)   dextrose 10 % infusion (has no administration in time range)   dextrose 10 % infusion (has no administration in time range)   dextrose 10% bolus 125 mL (has no administration in time range)   dextrose 10% bolus 250 mL (has no administration in time range)   insulin regular in 0.9 % NaCl 100 unit/100 mL (1 unit/mL) infusion (has no administration in time range)   DOBUtamine 1000 mg in D5W 250 mL infusion (has no administration in time range)   sodium chloride 0.9% flush 10 mL (has no administration in time range)   sodium chloride 0.9% bolus 500 mL (has no administration in time range)     Medical Decision Making:   History:   Old Medical Records: I decided to obtain old medical records.  Old Records Summarized: records from clinic visits.  Initial Assessment:   This is a 37-year-old man with a history of newly diagnosed diastolic heart failure, newly diagnosed diabetes who presents with acute onset lightheadedness, orthostasis, and elevated blood sugar.  He was noted to be hypotensive in clinic and in triage, but on my evaluation in the room is improved to 125/69 without intervention.  He appears well perfused.  Given his history of diuresis over the we can and polyuria from hyperglycemia, I suspect he is intravascularly dry.  However given his heart failure on Milrinone, I do not think it is appropriate to bolus him with fluids.  We will initiate insulin therapy for his hyperglycemia.  His labs are not consistent with DKA, though beta hydroxybutyrate is pending.  I have sent a serum oz a med I think clinically his exam is  not suggestive of HHS.  I discussed the patient with Cardiology who would like to transition him to dobutamine, and will admit him to the CCU.                      Clinical Impression:   Final diagnoses:  [R53.83] Fatigue  [R73.9] Hyperglycemia (Primary)  [N17.9] ROC (acute kidney injury)  [R42] Lightheadedness          ED Disposition Condition    Admit               Yumiko Awad MD  05/24/22 1468

## 2022-05-25 ENCOUNTER — DOCUMENTATION ONLY (OUTPATIENT)
Dept: CARDIOLOGY | Facility: HOSPITAL | Age: 37
End: 2022-05-25
Payer: MEDICAID

## 2022-05-25 PROBLEM — E11.00 HYPEROSMOLAR HYPERGLYCEMIC STATE (HHS): Status: ACTIVE | Noted: 2022-05-25

## 2022-05-25 LAB
ALLENS TEST: NORMAL
ANION GAP SERPL CALC-SCNC: 11 MMOL/L (ref 8–16)
ANION GAP SERPL CALC-SCNC: 14 MMOL/L (ref 8–16)
ANISOCYTOSIS BLD QL SMEAR: SLIGHT
BASOPHILS # BLD AUTO: 0.02 K/UL (ref 0–0.2)
BASOPHILS NFR BLD: 0.2 % (ref 0–1.9)
BUN SERPL-MCNC: 36 MG/DL (ref 6–20)
BUN SERPL-MCNC: 40 MG/DL (ref 6–20)
CALCIUM SERPL-MCNC: 10.1 MG/DL (ref 8.7–10.5)
CALCIUM SERPL-MCNC: 9.8 MG/DL (ref 8.7–10.5)
CHLORIDE SERPL-SCNC: 92 MMOL/L (ref 95–110)
CHLORIDE SERPL-SCNC: 93 MMOL/L (ref 95–110)
CO2 SERPL-SCNC: 23 MMOL/L (ref 23–29)
CO2 SERPL-SCNC: 29 MMOL/L (ref 23–29)
CREAT SERPL-MCNC: 2.3 MG/DL (ref 0.5–1.4)
CREAT SERPL-MCNC: 2.5 MG/DL (ref 0.5–1.4)
DELSYS: NORMAL
DIFFERENTIAL METHOD: ABNORMAL
EOSINOPHIL # BLD AUTO: 0 K/UL (ref 0–0.5)
EOSINOPHIL NFR BLD: 0.1 % (ref 0–8)
ERYTHROCYTE [DISTWIDTH] IN BLOOD BY AUTOMATED COUNT: 12.1 % (ref 11.5–14.5)
EST. GFR  (AFRICAN AMERICAN): 36.5 ML/MIN/1.73 M^2
EST. GFR  (AFRICAN AMERICAN): 40.4 ML/MIN/1.73 M^2
EST. GFR  (NON AFRICAN AMERICAN): 31.6 ML/MIN/1.73 M^2
EST. GFR  (NON AFRICAN AMERICAN): 35 ML/MIN/1.73 M^2
GLUCOSE SERPL-MCNC: 264 MG/DL (ref 70–110)
GLUCOSE SERPL-MCNC: 357 MG/DL (ref 70–110)
HCT VFR BLD AUTO: 41.1 % (ref 40–54)
HGB BLD-MCNC: 14.8 G/DL (ref 14–18)
IMM GRANULOCYTES # BLD AUTO: 0.03 K/UL (ref 0–0.04)
IMM GRANULOCYTES NFR BLD AUTO: 0.3 % (ref 0–0.5)
LDH SERPL L TO P-CCNC: 1.03 MMOL/L (ref 0.36–1.25)
LYMPHOCYTES # BLD AUTO: 1.5 K/UL (ref 1–4.8)
LYMPHOCYTES NFR BLD: 14.2 % (ref 18–48)
MCH RBC QN AUTO: 30.3 PG (ref 27–31)
MCHC RBC AUTO-ENTMCNC: 36 G/DL (ref 32–36)
MCV RBC AUTO: 84 FL (ref 82–98)
MONOCYTES # BLD AUTO: 0.4 K/UL (ref 0.3–1)
MONOCYTES NFR BLD: 3.9 % (ref 4–15)
NEUTROPHILS # BLD AUTO: 8.4 K/UL (ref 1.8–7.7)
NEUTROPHILS NFR BLD: 81.3 % (ref 38–73)
NRBC BLD-RTO: 0 /100 WBC
PLATELET # BLD AUTO: 257 K/UL (ref 150–450)
PLATELET BLD QL SMEAR: ABNORMAL
PMV BLD AUTO: 10.3 FL (ref 9.2–12.9)
POC TEMPERATURE: NORMAL
POCT GLUCOSE: 169 MG/DL (ref 70–110)
POCT GLUCOSE: 205 MG/DL (ref 70–110)
POCT GLUCOSE: 272 MG/DL (ref 70–110)
POCT GLUCOSE: 276 MG/DL (ref 70–110)
POCT GLUCOSE: 276 MG/DL (ref 70–110)
POCT GLUCOSE: 289 MG/DL (ref 70–110)
POCT GLUCOSE: 310 MG/DL (ref 70–110)
POCT GLUCOSE: 337 MG/DL (ref 70–110)
POCT GLUCOSE: 345 MG/DL (ref 70–110)
POCT GLUCOSE: 387 MG/DL (ref 70–110)
POCT GLUCOSE: >500 MG/DL (ref 70–110)
POIKILOCYTOSIS BLD QL SMEAR: SLIGHT
POTASSIUM SERPL-SCNC: 3.8 MMOL/L (ref 3.5–5.1)
POTASSIUM SERPL-SCNC: 4.5 MMOL/L (ref 3.5–5.1)
RBC # BLD AUTO: 4.88 M/UL (ref 4.6–6.2)
SAMPLE: NORMAL
SITE: NORMAL
SODIUM SERPL-SCNC: 130 MMOL/L (ref 136–145)
SODIUM SERPL-SCNC: 132 MMOL/L (ref 136–145)
TIME NOTIFIED: 152
WBC # BLD AUTO: 10.27 K/UL (ref 3.9–12.7)

## 2022-05-25 PROCEDURE — 25000003 PHARM REV CODE 250: Mod: NTX | Performed by: INTERNAL MEDICINE

## 2022-05-25 PROCEDURE — 99223 PR INITIAL HOSPITAL CARE,LEVL III: ICD-10-PCS | Mod: NTX,,, | Performed by: INTERNAL MEDICINE

## 2022-05-25 PROCEDURE — 25000003 PHARM REV CODE 250: Mod: NTX | Performed by: STUDENT IN AN ORGANIZED HEALTH CARE EDUCATION/TRAINING PROGRAM

## 2022-05-25 PROCEDURE — 83605 ASSAY OF LACTIC ACID: CPT | Mod: NTX

## 2022-05-25 PROCEDURE — 99223 1ST HOSP IP/OBS HIGH 75: CPT | Mod: NTX,,, | Performed by: INTERNAL MEDICINE

## 2022-05-25 PROCEDURE — C9399 UNCLASSIFIED DRUGS OR BIOLOG: HCPCS | Mod: NTX | Performed by: STUDENT IN AN ORGANIZED HEALTH CARE EDUCATION/TRAINING PROGRAM

## 2022-05-25 PROCEDURE — 99222 1ST HOSP IP/OBS MODERATE 55: CPT | Mod: NTX,,, | Performed by: INTERNAL MEDICINE

## 2022-05-25 PROCEDURE — 85025 COMPLETE CBC W/AUTO DIFF WBC: CPT | Mod: NTX | Performed by: INTERNAL MEDICINE

## 2022-05-25 PROCEDURE — 86341 ISLET CELL ANTIBODY: CPT | Mod: NTX | Performed by: STUDENT IN AN ORGANIZED HEALTH CARE EDUCATION/TRAINING PROGRAM

## 2022-05-25 PROCEDURE — 82803 BLOOD GASES ANY COMBINATION: CPT | Mod: NTX

## 2022-05-25 PROCEDURE — 20600001 HC STEP DOWN PRIVATE ROOM: Mod: NTX

## 2022-05-25 PROCEDURE — 80048 BASIC METABOLIC PNL TOTAL CA: CPT | Mod: NTX | Performed by: INTERNAL MEDICINE

## 2022-05-25 PROCEDURE — 36600 WITHDRAWAL OF ARTERIAL BLOOD: CPT | Mod: NTX

## 2022-05-25 PROCEDURE — 99222 PR INITIAL HOSPITAL CARE,LEVL II: ICD-10-PCS | Mod: NTX,,, | Performed by: INTERNAL MEDICINE

## 2022-05-25 PROCEDURE — 99900035 HC TECH TIME PER 15 MIN (STAT): Mod: NTX

## 2022-05-25 PROCEDURE — 63600175 PHARM REV CODE 636 W HCPCS: Mod: NTX | Performed by: STUDENT IN AN ORGANIZED HEALTH CARE EDUCATION/TRAINING PROGRAM

## 2022-05-25 PROCEDURE — 63600175 PHARM REV CODE 636 W HCPCS: Mod: JG,NTX | Performed by: STUDENT IN AN ORGANIZED HEALTH CARE EDUCATION/TRAINING PROGRAM

## 2022-05-25 RX ORDER — INSULIN ASPART 100 [IU]/ML
4 INJECTION, SOLUTION INTRAVENOUS; SUBCUTANEOUS
Status: DISCONTINUED | OUTPATIENT
Start: 2022-05-25 | End: 2022-05-26

## 2022-05-25 RX ORDER — IBUPROFEN 200 MG
24 TABLET ORAL
Status: DISCONTINUED | OUTPATIENT
Start: 2022-05-25 | End: 2022-05-27 | Stop reason: HOSPADM

## 2022-05-25 RX ORDER — IBUPROFEN 200 MG
16 TABLET ORAL
Status: DISCONTINUED | OUTPATIENT
Start: 2022-05-25 | End: 2022-05-27 | Stop reason: HOSPADM

## 2022-05-25 RX ORDER — INSULIN ASPART 100 [IU]/ML
1-10 INJECTION, SOLUTION INTRAVENOUS; SUBCUTANEOUS
Status: DISCONTINUED | OUTPATIENT
Start: 2022-05-25 | End: 2022-05-27 | Stop reason: HOSPADM

## 2022-05-25 RX ORDER — INSULIN ASPART 100 [IU]/ML
0-5 INJECTION, SOLUTION INTRAVENOUS; SUBCUTANEOUS
Status: DISCONTINUED | OUTPATIENT
Start: 2022-05-25 | End: 2022-05-25

## 2022-05-25 RX ORDER — GLUCAGON 1 MG
1 KIT INJECTION
Status: DISCONTINUED | OUTPATIENT
Start: 2022-05-25 | End: 2022-05-27 | Stop reason: HOSPADM

## 2022-05-25 RX ADMIN — INSULIN ASPART 5 UNITS: 100 INJECTION, SOLUTION INTRAVENOUS; SUBCUTANEOUS at 08:05

## 2022-05-25 RX ADMIN — ALTEPLASE 2 MG: 2.2 INJECTION, POWDER, LYOPHILIZED, FOR SOLUTION INTRAVENOUS at 05:05

## 2022-05-25 RX ADMIN — INSULIN ASPART 4 UNITS: 100 INJECTION, SOLUTION INTRAVENOUS; SUBCUTANEOUS at 11:05

## 2022-05-25 RX ADMIN — INSULIN ASPART 4 UNITS: 100 INJECTION, SOLUTION INTRAVENOUS; SUBCUTANEOUS at 05:05

## 2022-05-25 RX ADMIN — INSULIN ASPART 2 UNITS: 100 INJECTION, SOLUTION INTRAVENOUS; SUBCUTANEOUS at 04:05

## 2022-05-25 RX ADMIN — MUPIROCIN: 20 OINTMENT TOPICAL at 11:05

## 2022-05-25 RX ADMIN — INSULIN ASPART 6 UNITS: 100 INJECTION, SOLUTION INTRAVENOUS; SUBCUTANEOUS at 05:05

## 2022-05-25 RX ADMIN — INSULIN DETEMIR 15 UNITS: 100 INJECTION, SOLUTION SUBCUTANEOUS at 11:05

## 2022-05-25 RX ADMIN — INSULIN ASPART 3 UNITS: 100 INJECTION, SOLUTION INTRAVENOUS; SUBCUTANEOUS at 07:05

## 2022-05-25 RX ADMIN — INSULIN ASPART 8 UNITS: 100 INJECTION, SOLUTION INTRAVENOUS; SUBCUTANEOUS at 11:05

## 2022-05-25 NOTE — CONSULTS
[facility] - Cardiac Intensive Care  Endocrinology  Diabetes Consult Note    Consult Requested by: Luca Lopez Jr.,*   Reason for admit: <principal problem not specified>    HISTORY OF PRESENT ILLNESS:  Reason for Consult: Management of T2DM, Hyperglycemia     Diabetes diagnosis year: 21 HbA1c 6.8; repeat A1c on  was 9.8    Home Diabetes Medications:  Prescribed lantus 10 units on  - not taking as patient did not have needles to administer insulin at home    How often checking glucose at home?  Patient has not been checking blood sugars at home as he did not have a glucose meter     BG readings on regimen: NA/unknown  Hypoglycemia on the regimen?  No  Missed doses on regimen?  No    Diabetes Complications include:     Hyperglycemia    Complicating diabetes co morbidities:   History of MI, CHF, and CKD      HPI:   Patient is a 37 y.o. male with a diagnosis of NICM, diastolic CHF (EF 15%), s/p ICD, CKD stage III admitted for acute decompensated CHF and found to be in HHS on admission. On admission, blood sugar elevated to 853 with no ketoacidosis, anion gap noted on labs.  Patient was started on insulin gtt with IV fluids. Patient being treated for CHF by CICU and currently on dobutamine gtt.     Chart review suggest diagnosis of diabetes in 2021 with a A1c 6.8, now presenting with A1c of 9.2  Positive family history of both parents  Symptomatic, endorses polyuria polydipsia, weight change, cramps progressive worsening.  Compensated over the past 1-2 weeks with water, soda, Gatorade    Endocrinology consulted regarding HHS and new onset diabetes management.         Interval HPI:     Overnight events: Patient had no acute events overnight. Remains on dobutamine drip for hypotension. Blood sugars down trending with insulin gtt, transitioned off drip overnight and started on subcutaneous insulin this morning.     Eatin%  Nausea: No  Hypoglycemia and intervention: No  Fever: No  TPN and/or TF:  No  If yes, type of TF/TPN and rate: na    PMH, PSH, FH, SH updated and reviewed     ROS:as above    Current Medications and/or Treatments Impacting Glycemic Control  Immunotherapy:    Immunosuppressants       None          Steroids:   Hormones (From admission, onward)                None          Pressors:    Autonomic Drugs (From admission, onward)                None          Hyperglycemia/Diabetes Medications:   Antihyperglycemics (From admission, onward)                Start     Stop Route Frequency Ordered    05/25/22 1111  insulin aspart U-100 pen 1-10 Units         -- SubQ Before meals & nightly PRN 05/25/22 1013    05/25/22 1015  insulin detemir U-100 pen 15 Units         -- SubQ Daily 05/25/22 1013    05/25/22 1015  insulin aspart U-100 pen 4 Units         -- SubQ 3 times daily with meals 05/25/22 1013             PHYSICAL EXAMINATION:  Vitals:    05/25/22 1000   BP: 112/70   Pulse: (!) 115   Resp: (!) 23   Temp:      Body mass index is 25.82 kg/m².    Physical Exam  Vitals reviewed.   Constitutional:       General: He is not in acute distress.     Appearance: Normal appearance. He is not ill-appearing.   HENT:      Head: Normocephalic and atraumatic.   Eyes:      Extraocular Movements: Extraocular movements intact.      Pupils: Pupils are equal, round, and reactive to light.   Cardiovascular:      Rate and Rhythm: Normal rate and regular rhythm.   Pulmonary:      Effort: Pulmonary effort is normal. No respiratory distress.      Breath sounds: Normal breath sounds.   Abdominal:      General: Bowel sounds are normal. There is no distension.      Palpations: Abdomen is soft.      Tenderness: There is no abdominal tenderness.   Musculoskeletal:         General: No swelling or tenderness. Normal range of motion.      Right lower leg: No edema.      Left lower leg: No edema.   Skin:     General: Skin is warm and dry.   Neurological:      General: No focal deficit present.      Mental Status: He is alert and  oriented to person, place, and time. Mental status is at baseline.   Psychiatric:         Mood and Affect: Mood normal.         Behavior: Behavior normal.         Labs Reviewed and Include   Recent Labs   Lab 05/24/22  1607 05/24/22 2023 05/25/22  0745   *   < > 264*   CALCIUM 10.1   < > 10.1   ALBUMIN 4.2  --   --    PROT 8.5*  --   --    *   < > 132*   K 4.3   < > 3.8   CO2 23   < > 29   CL 83*   < > 92*   BUN 36*   < > 36*   CREATININE 2.8*   < > 2.3*   ALKPHOS 110  --   --    ALT 26  --   --    AST 20  --   --    BILITOT 0.7  --   --     < > = values in this interval not displayed.     Lab Results   Component Value Date    WBC 10.27 05/25/2022    HGB 14.8 05/25/2022    HCT 41.1 05/25/2022    MCV 84 05/25/2022     05/25/2022     No results for input(s): TSH, FREET4 in the last 168 hours.  Lab Results   Component Value Date    HGBA1C 9.2 (H) 05/20/2022       Nutritional status:   Body mass index is 25.82 kg/m².  Lab Results   Component Value Date    ALBUMIN 4.2 05/24/2022    ALBUMIN 4.4 05/20/2022    ALBUMIN 3.9 04/25/2022     Lab Results   Component Value Date    PREALBUMIN 36 04/18/2022       Estimated Creatinine Clearance: 52.6 mL/min (A) (based on SCr of 2.3 mg/dL (H)).    Accu-Checks  Recent Labs     05/24/22  2112 05/24/22  2149 05/24/22  2242 05/24/22  2351 05/25/22  0125 05/25/22  0335 05/25/22  0448 05/25/22  0622 05/25/22  0745 05/25/22  1139   POCTGLUCOSE 119* 133* 144* 169* 205* 345* 337* 289* 276* 310*        ASSESSMENT and PLAN    Uncontrolled diabetes mellitus  Consulted for:   [facility]  DM type:  T2D diagnosed 4/2021 w/o h/o med use  A1C: 9.2  Hypoglycemia:  none  Steroids:   none  Diet/Tfs:    50%  Glucose Goals:  140-180 mg/dL    Glucose Trend x 24hr:    169-350    Home Regimen:    None at this time, was prescribed lantus 10 units daily 5/20  Total daily dose weight based approximately 46 units    --Anion gap 11, bicarbonate 29, A1c 9.2  --Creatinine 2.3  --Tolerating diet and off  of insulin drip since late yesterday evening  --Down trending sodium on half normal saline 75 cc in the setting of heart failure, now discontinued  --Starting MDI today     PLAN:  -  Pending QUINTIN  -  Detemir  15  units daily  -  Aspart   4  units TIDWM, increase today pending po intake  -  Mod dose correction insulin  -  Accucheck achs2      Hyperosmolar hyperglycemic state (HHS)  resolving  Patient was started on insulin gtt on admission, transitioned to subcutaneous insulin 5/25.    See Diabetes management      CKD (chronic kidney disease) stage 3, GFR 30-59 ml/min  Caution with aggressive insulin adjustments to reduce risk of hypoglycemia  Titrate insulin slowly to avoid hypoglycemia as the risk of hypoglycemia increases with decreased creatinine clearance.      Chronic combined systolic and diastolic congestive heart failure  Mr. Queen is a 37 year old man with history of NICM admitted with ADHF/cardiogenic shock on home milrinone. He presented with symptoms of feeling weak and fatigued. Most recent echo shows EF 15% with reduced systolic and diastolic dysfunction. Patient started on dobutamine drip per CICU.     Echo  · The left ventricle is severely enlarged with severely decreased systolic function. The estimated ejection fraction is 15%.  · Severe right ventricular enlargement with moderately reduced right ventricular systolic function.  Plan:  -manage per primary team (CICU)      Thong Griffith MD  Endocrinology  [facility] - Cardiac Intensive Care

## 2022-05-25 NOTE — SUBJECTIVE & OBJECTIVE
Interval History: No events overnight , JVP at the clavicle     Continuous Infusions:   DOBUTamine IV infusion (non-titrating) 5 mcg/kg/min (05/25/22 1501)     Scheduled Meds:   insulin aspart U-100  4 Units Subcutaneous TIDWM    insulin detemir U-100  15 Units Subcutaneous Daily    mupirocin   Nasal BID     PRN Meds:dextrose 10 % in water (D10W), dextrose 10 % in water (D10W), dextrose 10%, dextrose 10%, glucagon (human recombinant), glucose, glucose, insulin aspart U-100, sodium chloride 0.9%, sodium chloride 0.9%    Review of patient's allergies indicates:   Allergen Reactions    Bumex [bumetanide] Hives    Lactose Diarrhea     Other reaction(s): Abdominal distension, gaseous    Torsemide Hives     Objective:     Vital Signs (Most Recent):  Temp: 98.1 °F (36.7 °C) (05/25/22 1516)  Pulse: 86 (05/25/22 1600)  Resp: 18 (05/25/22 1516)  BP: 108/66 (05/25/22 1516)  SpO2: 96 % (05/25/22 1516) Vital Signs (24h Range):  Temp:  [98.1 °F (36.7 °C)-98.9 °F (37.2 °C)] 98.1 °F (36.7 °C)  Pulse:  [] 86  Resp:  [12-39] 18  SpO2:  [94 %-100 %] 96 %  BP: ()/(46-73) 108/66     Patient Vitals for the past 72 hrs (Last 3 readings):   Weight   05/25/22 0500 93.7 kg (206 lb 9.1 oz)   05/25/22 0000 93.7 kg (206 lb 9.1 oz)   05/24/22 1945 93.7 kg (206 lb 9.1 oz)     Body mass index is 25.82 kg/m².      Intake/Output Summary (Last 24 hours) at 5/25/2022 1656  Last data filed at 5/25/2022 1501  Gross per 24 hour   Intake 348.07 ml   Output 501 ml   Net -152.93 ml       Hemodynamic Parameters:       Telemetry: NSR     Physical Exam  Constitutional:       Appearance: Normal appearance.   HENT:      Head: Normocephalic.      Mouth/Throat:      Mouth: Mucous membranes are dry.   Eyes:      Extraocular Movements: Extraocular movements intact.      Pupils: Pupils are equal, round, and reactive to light.   Cardiovascular:      Rate and Rhythm: Normal rate and regular rhythm.   Pulmonary:      Effort: Pulmonary effort is normal.       Breath sounds: Normal breath sounds.   Abdominal:      General: Abdomen is flat. Bowel sounds are normal.      Palpations: Abdomen is soft.   Musculoskeletal:         General: Normal range of motion.      Cervical back: Normal range of motion.   Skin:     General: Skin is warm and dry.      Capillary Refill: Capillary refill takes less than 2 seconds.   Neurological:      General: No focal deficit present.      Mental Status: He is alert.         Significant Labs:  CBC:  Recent Labs   Lab 05/24/22  1237 05/24/22  1607 05/25/22  0336   WBC 8.52 8.50 10.27   RBC 4.93 4.71 4.88   HGB 15.0 14.4 14.8   HCT 43.1 41.1 41.1    305 257   MCV 87 87 84   MCH 30.4 30.6 30.3   MCHC 34.8 35.0 36.0     BNP:  Recent Labs   Lab 05/24/22  1237   BNP 49     CMP:  Recent Labs   Lab 05/20/22  1203 05/24/22  1237 05/24/22  1607 05/24/22 2023 05/25/22  0336 05/25/22  0745   GLU  --    < > 784* 168* 357* 264*   CALCIUM 10.7*   < > 10.1 10.7* 9.8 10.1   ALBUMIN 4.4  --  4.2  --   --   --    PROT  --   --  8.5*  --   --   --    *   < > 120* 135* 130* 132*   K 4.0   < > 4.3 3.0* 4.5 3.8   CO2 28   < > 23 27 23 29   CL  --    < > 83* 92* 93* 92*   BUN 18.7   < > 36* 40* 40* 36*   CREATININE 2.02*   < > 2.8* 2.6* 2.5* 2.3*   ALKPHOS 103  --  110  --   --   --    ALT 32  --  26  --   --   --    AST 29  --  20  --   --   --    BILITOT 0.8  --  0.7  --   --   --     < > = values in this interval not displayed.      Coagulation:   No results for input(s): PT, INR, APTT in the last 168 hours.  LDH:  No results for input(s): LDH in the last 72 hours.  Microbiology:  Microbiology Results (last 7 days)       ** No results found for the last 168 hours. **            I have reviewed all pertinent labs within the past 24 hours.    Estimated Creatinine Clearance: 52.6 mL/min (A) (based on SCr of 2.3 mg/dL (H)).    Diagnostic Results:  I have reviewed and interpreted all pertinent imaging results/findings within the past 24 hours.

## 2022-05-25 NOTE — NURSING
Patient arrived from ER. Patient having severe cramping of abdomen, legs and upper body. Patient also c/o nausea. Dr Hussein on floor gave a verbal order for zofran to be given

## 2022-05-25 NOTE — HPI
Reason for Consult: Management of T2DM, Hyperglycemia         Diabetes diagnosis year: 4/21/21 HbA1c 6.8; repeat A1c on 5/20 was 9.8    Home Diabetes Medications:  Prescribed lantus 10 units on 5/20 - not taking as patient did not have needles to administer insulin at home    How often checking glucose at home?  Patient has not been checking blood sugars at home as he did not have a glucose meter     BG readings on regimen: NA/unknown  Hypoglycemia on the regimen?  No  Missed doses on regimen?  No    Diabetes Complications include:     Hyperglycemia    Complicating diabetes co morbidities:   History of MI, CHF, and CKD      HPI:   Patient is a 37 y.o. male with a diagnosis of NICM, diastolic CHF (EF 15%), s/p ICD, CKD stage III admitted for acute decompensated CHF and found to be in HHS on admission. On admission, blood sugar elevated to 853 with no ketoacidosis, anion gap noted on labs. Patient noted significant polydipsia and polyuria prior to admission. Patient was started on insulin gtt with IV fluids. Patient being treated for CHF by CICU and currently on dobutamine gtt. Endocrinology consulted regarding HHS and new onset diabetes management.

## 2022-05-25 NOTE — SUBJECTIVE & OBJECTIVE
Interval History: No events overnight, Sugars better this morning. JVP at clavicle. Creatinine slowly coming down.     ROS  Objective:     Vital Signs (Most Recent):  Temp: 98.1 °F (36.7 °C) (05/25/22 1516)  Pulse: 86 (05/25/22 1600)  Resp: 18 (05/25/22 1516)  BP: 108/66 (05/25/22 1516)  SpO2: 96 % (05/25/22 1516) Vital Signs (24h Range):  Temp:  [98.1 °F (36.7 °C)-98.9 °F (37.2 °C)] 98.1 °F (36.7 °C)  Pulse:  [] 86  Resp:  [12-39] 18  SpO2:  [94 %-100 %] 96 %  BP: ()/(46-73) 108/66     Weight: 93.7 kg (206 lb 9.1 oz)  Body mass index is 25.82 kg/m².     SpO2: 96 %  O2 Device (Oxygen Therapy): room air    Physical Exam  Constitutional:       Appearance: Normal appearance.   HENT:      Head: Normocephalic.      Nose: Nose normal.   Eyes:      Extraocular Movements: Extraocular movements intact.      Pupils: Pupils are equal, round, and reactive to light.   Neck:      Comments: JVP at clavicle   Cardiovascular:      Rate and Rhythm: Normal rate and regular rhythm.   Pulmonary:      Effort: Pulmonary effort is normal.      Breath sounds: Normal breath sounds.   Abdominal:      General: Abdomen is flat. Bowel sounds are normal.      Palpations: Abdomen is soft.   Musculoskeletal:      Cervical back: Normal range of motion.   Skin:     General: Skin is warm.      Capillary Refill: Capillary refill takes less than 2 seconds.   Neurological:      General: No focal deficit present.      Mental Status: He is alert and oriented to person, place, and time.       Significant Labs: All pertinent lab results from the last 24 hours have been reviewed.

## 2022-05-25 NOTE — ASSESSMENT & PLAN NOTE
ROC on CKD stage 3  On presentation. Baseline around 2. Presents with creatinine of 3.  Creatinine trended down to 2.3

## 2022-05-25 NOTE — ASSESSMENT & PLAN NOTE
Mr. Queen is a 37 year old man with history of NICM admitted with ADHF/cardiogenic shock on home milrinone. He presented with symptoms of feeling weak and fatigued. Most recent echo shows EF 15% with reduced systolic and diastolic dysfunction. Patient started on dobutamine drip per CICU.     Echo    Interpretation Summary  · The left ventricle is severely enlarged with severely decreased systolic function. The estimated ejection fraction is 15%.  · Severe right ventricular enlargement with moderately reduced right ventricular systolic function.  · Left ventricular diastolic dysfunction.  · Mild left atrial enlargement.  · Moderate mitral regurgitation.  · Mild tricuspid regurgitation.  · The estimated PA systolic pressure is 34 mmHg.  · Normal central venous pressure (3 mmHg).    Plan:  -manage per primary team (CICU)

## 2022-05-25 NOTE — PROGRESS NOTES
Admit Note     Met with patient and significant other to assess needs. Patient is a 37 y.o. single male, admitted for ADHF/cardiogenic shock, chronic combined systolic and diastolic heart failure, diabetes.    Pt reports he has a frozen shoulder which is effecting his ADL's.    Patient admitted to [facility] on 5/24/2022 .  At this time, patient presents as alert and oriented x 4, good eye contact, calm and communicative.  At this time, patients caregiver presents as alert and oriented x 4, good eye contact, calm and communicative.    Household/Family Systems     The pt, his significant other and three minor children lives with the pt's mother at     [address] [address].    2 hours from [facility]    Support system includes significant other, mother and minor/adult  children.   The pt reports he has seven children, two with the pt's significant other.    One other pt's children from a previous relationship lives with the pt the other four live with their mothers.   Pt and significant other have been together for the last eight years.    Pt reports his significant other, Niharika will be the primary caregiver and the pt's mother is the secondary caregiver.  Pt reports he was told over the phone he is being evaluated for a heart transplant only.        Patients primary caregiver is self with daily support from his significant other Niharika.      Pt's cell; [phone]    Emergency contacts:   Niharika Wright(significant other, drives) [phone]----new number  Malou Dumont (mother, drives) [phone]    During admission, patient's caregiver plans to stay in patient's room.  Confirmed patient and patients caregivers do have access to reliable transportation. Pt and significant other drive    Cognitive Status/Learning     Patient reports reading ability as college and states patient does not have difficulty with reading, writing, hearing, comprehension, learning and memory.  Pt reports difficulty with eye  sight due to diabetic issues.    Patient reports patient learns best by multiple methods.      Needed: No.   Highest education level: Attended College/Technical School    Vocation/Disability   .  Working for Income: No  If no, reason not working: Demands of Treatment  Patient reports he has applied for disability and has a  assisting with same.   Pt used to work as a CNA.  The pt's spouse is a  Homemaker.  Pt's mother receives monthly income and assists pt with his financial needs.      Adherence     Patient reports a high level of adherence to patients health care regimen.  Adherence counseling and education provided. Patient verbalizes understanding.    Substance Use    Patient reports the following substance usage.    Tobacco: no current use. The pt started smoking at age 18 and quit on April 12th 2022.  Pt used to smoke from .5 to 1ppd.   Alcohol: none, patient denies any use.  Illicit Drugs/Non-prescribed Medications: no current use.  Pt reports he stopped using Marijuana 11/2021, cocaine 7/2021 and ecstasy 9/2020    Patient and significant other stats a clear understanding of the potential impact of substance use.  Substance abstinence/cessation counseling, education and resources provided and reviewed.     Services Utilizing/ADLS    Infusion Service: Prior to admission, patient utilizing? Yes, [facility] full service- pt reports his medication has been changed to . Pt would like to continue with the same IV company when discharged. [phone], [phone] fax  Home Health: Prior to admission, patient utilizing? No.    DME: Prior to admission, none.  Pt reports need of a glucometer prior to discharge.    Pulmonary/Cardiac Rehab: Prior to admission, no.  Pt reports he's being referred to out pt physical therapy due to his shoulder.   Dialysis:  Prior to admission, no  Transplant Specialty Pharmacy:  Prior to admission, no.    Prior to admission, patient reports patient was only  somewhat  independent with ADLS. Pt reports due to his shoulder he's not able to dress or bathe himself.  Pt does drive.  Patient reports patient is not able to care for self at this time due to compromised medical condition (as documented in medical record) and physical weakness..  Patient indicates a willingness to care for self once medically cleared to do so.    Insurance/Medications    Insured by   Payer/Plan Subscr  Sex Relation Sub. Ins. ID Effective Group Num   1. MEDICAID - HE* JOSE J DAMICO 1985 Male Self [id number] 3/1/20 [id number]                                   [address] [address]      Primary Insurance (for UNOS reporting): Public Insurance - Medicaid  Secondary Insurance (for UNOS reporting): None    Patient reports patient is able to obtain and afford medications at this time and at time of discharge.    Living Will/Healthcare Power of     Patient states patient does not have a LW and/or HCPA. Pt reports his mother is his HCPA, however pt has not completed HCPA.     provided education regarding LW and HCPA and the completion of forms.    Coping/Mental Health    Patient is coping adequately with the aid of  family members and friends.  Patient indicates mental health difficulties.   Pt reports difficulty with depression, anxiety, anger and is taking Buspar and Lexapro.  Pt reports he is not participating in out pt counseling.   Pt reports he utilizes music and nature to help him relax.    Worker provided support, education, and coping strategies.    Discharge Planning    At time of discharge, patient plans to return to patient's home under the care of self and significant other .  Patients significant other will transport patient.  Per rounds today, expected discharge date is possibly tomorrow. Patient and patients caregiver  verbalize understanding and are involved in treatment planning and discharge process.    Additional Concerns    Patient's caretaker denies additional  needs and/or concerns at this time. Patient is being followed for needs, education, resources, information, emotional support, supportive counseling, and for supportive and skilled discharge plan of care.  providing ongoing psychosocial support, education, resources and d/c planning as needed.  SW remains available. Patient's caregiver verbalizes understanding and agreement with information reviewed,  availability and how to access available resources as needed. Patient denies additional needs and/or concerns at this time. Patient verbalizes understanding and agreement with information reviewed, social work availability, and how to access available resources as needed.

## 2022-05-25 NOTE — ASSESSMENT & PLAN NOTE
Caution with aggressive insulin adjustments to reduce risk of hypoglycemia  Titrate insulin slowly to avoid hypoglycemia as the risk of hypoglycemia increases with decreased creatinine clearance.

## 2022-05-25 NOTE — CONSULTS
Nutrition-Related Diabetes Education      Time Spent: 15 minutes     Learners: Pt     Current HbA1c: 9.2 on 5/20/22    Is patient aware of their A1c and their goal A1c?     yes      Nutrition Education with handouts: RD discussed how counting carbohydrates can help control blood glucose levels, and which foods contain carbohydrates such as breads, tortillas, fruits, and what a starchy vegetable is. The importance of consuming fiber, protein, and healthy fat along with carbohydrates to help regulate blood sugar levels. Discussed label reading and what serving sizes to be mindful of. Discussed how to plan meals and how half the plate should consist of non-starchy vegetables such as salad green, broccoli and carrots along with pairing it with a protein source. Discussed which foods constitute healthy fat such as olive oil, nuts, and avocado.       Comments: Pt reports a good appetite currently and PTA. Pt very attentive during time of RD visit. RD reiterated monitoring carbohydrates and that pt needs to start taking insulin at home. Reports no issues chewing/swallowing at this time. Pt v/u understanding. Handouts provided.  No n/v/d/c. NFPE completed 5/25/22 no s/s of malnutrition at this time. LBM noted- 5/24/22      Barriers to Learning: None at this time.    Follow up: Yes     Please consult as needed.  Thank you!    By Samantha IVY

## 2022-05-25 NOTE — ED NOTES
"Pt presents to ED in personal vehicle w/ family member. Pt is AAOx4. RR is even, unlabored, and spontaneous. Skin is warm, dry and intact. Pt states he was sent as a referral to ED from his cardiology appt d/t low BP despite Milrinone pump. Pt also reports being recently diagnosed w/ insulin-dependent diabetes -- however, never acquired needles or a glucometer. Pt states he started to "feel weird" and had blurred vision. Pt placed on continuous cardiac monitor, BP cuff, and pulse ox. Bed low and locked; side rails up x2; call light w/in reach. Will continue to monitor.    Patient identifiers for Kevan Queen 37 y.o. male checked and correct.  Chief Complaint   Patient presents with    Hypotension     Sent from cards clinic for admission, symptomatic hypotension, on primacor drip     Past Medical History:   Diagnosis Date    Arthritis     Cardiomyopathy     CHF (congestive heart failure) 10/01/2020    Dilated cardiomyopathy 10/26/2020    Drug abuse 10/2020    Renal disorder      Allergies reported:   Review of patient's allergies indicates:   Allergen Reactions    Bumex [bumetanide] Hives    Lactose Diarrhea     Other reaction(s): Abdominal distension, gaseous    Torsemide Hives     "

## 2022-05-25 NOTE — ASSESSMENT & PLAN NOTE
Consulted for:   [facility]  DM type:  T2D diagnosed 4/2021 w/o h/o med use  A1C: 9.2  Hypoglycemia:  none  Steroids:   none  Diet/Tfs:    50%  Glucose Goals:  140-180 mg/dL    Glucose Trend x 24hr:    169-350    Home Regimen:    None at this time, was prescribed lantus 10 units daily 5/20  Total daily dose weight based approximately 46 units    --Anion gap 11, bicarbonate 29, A1c 9.2  --Creatinine 2.3  --Tolerating diet and off of insulin drip since late yesterday evening  --Down trending sodium on half normal saline 75 cc in the setting of heart failure, now discontinued  --Starting MDI today     PLAN:  -  Pending QUINTIN  -  Detemir  15  units daily  -  Aspart   4  units TIDWM, increase today pending po intake  -  Mod dose correction insulin  -  Accucheck achs2

## 2022-05-25 NOTE — PLAN OF CARE
Problem: Adult Inpatient Plan of Care  Goal: Patient-Specific Goal (Individualized)  Outcome: Ongoing, Progressing  Flowsheets (Taken 5/25/2022 4284)  Anxieties, Fears or Concerns: None voiced  Individualized Care Needs: Monitor electrolytes. Dobutamine gtt. Keep MAP above 60. Maintain BG protocol.  Patient-Specific Goals (Include Timeframe): Pt will be free of falls throughout shift.

## 2022-05-25 NOTE — PROGRESS NOTES
Cardiac device interrogation and/or reprogramming completed by industry representative, Awais COBB with [employer] on 5/24/22; please refer to report located in the Media tab.

## 2022-05-25 NOTE — ASSESSMENT & PLAN NOTE
-  Presents with DKA.   - Corrected Sodium is 135.  - Will use DKA protocol for repleting electrolytes (K and Phos)  -  gentle 0.9 NaCl bolus given his HFrEF    - Hold Entresto, Spironolactone and Dig given ROC  - BMP Q 6 hours while on insulin gtt for DKA    - Switch his Milirone to  at lower dose in one hour (to avoid accumulation of Milirone)   - Consult endocrine for further recommendation on Type I insulin   - Device check (ordered) [place] given his pain in the left shoulder     5/25  Endo on board. He is off insulin drip. On levemir and sliding scale.

## 2022-05-25 NOTE — PROGRESS NOTES
[facility] - Cardiology Stepdown  Heart Transplant  Progress Note    Patient Name: Kevan Queen  MRN: [id number]  Admission Date: 5/24/2022  Hospital Length of Stay: 1 days  Attending Physician: Luca Lopez Jr.,*  Primary Care Provider: ORALIA Cline  Principal Problem:<principal problem not specified>    Subjective:     Interval History: No events overnight , JVP at the clavicle     Continuous Infusions:   DOBUTamine IV infusion (non-titrating) 5 mcg/kg/min (05/25/22 1501)     Scheduled Meds:   insulin aspart U-100  4 Units Subcutaneous TIDWM    insulin detemir U-100  15 Units Subcutaneous Daily    mupirocin   Nasal BID     PRN Meds:dextrose 10 % in water (D10W), dextrose 10 % in water (D10W), dextrose 10%, dextrose 10%, glucagon (human recombinant), glucose, glucose, insulin aspart U-100, sodium chloride 0.9%, sodium chloride 0.9%    Review of patient's allergies indicates:   Allergen Reactions    Bumex [bumetanide] Hives    Lactose Diarrhea     Other reaction(s): Abdominal distension, gaseous    Torsemide Hives     Objective:     Vital Signs (Most Recent):  Temp: 98.1 °F (36.7 °C) (05/25/22 1516)  Pulse: 86 (05/25/22 1600)  Resp: 18 (05/25/22 1516)  BP: 108/66 (05/25/22 1516)  SpO2: 96 % (05/25/22 1516) Vital Signs (24h Range):  Temp:  [98.1 °F (36.7 °C)-98.9 °F (37.2 °C)] 98.1 °F (36.7 °C)  Pulse:  [] 86  Resp:  [12-39] 18  SpO2:  [94 %-100 %] 96 %  BP: ()/(46-73) 108/66     Patient Vitals for the past 72 hrs (Last 3 readings):   Weight   05/25/22 0500 93.7 kg (206 lb 9.1 oz)   05/25/22 0000 93.7 kg (206 lb 9.1 oz)   05/24/22 1945 93.7 kg (206 lb 9.1 oz)     Body mass index is 25.82 kg/m².      Intake/Output Summary (Last 24 hours) at 5/25/2022 1656  Last data filed at 5/25/2022 1501  Gross per 24 hour   Intake 348.07 ml   Output 501 ml   Net -152.93 ml       Hemodynamic Parameters:       Telemetry: NSR     Physical Exam  Constitutional:       Appearance: Normal appearance.   HENT:       Head: Normocephalic.      Mouth/Throat:      Mouth: Mucous membranes are dry.   Eyes:      Extraocular Movements: Extraocular movements intact.      Pupils: Pupils are equal, round, and reactive to light.   Cardiovascular:      Rate and Rhythm: Normal rate and regular rhythm.   Pulmonary:      Effort: Pulmonary effort is normal.      Breath sounds: Normal breath sounds.   Abdominal:      General: Abdomen is flat. Bowel sounds are normal.      Palpations: Abdomen is soft.   Musculoskeletal:         General: Normal range of motion.      Cervical back: Normal range of motion.   Skin:     General: Skin is warm and dry.      Capillary Refill: Capillary refill takes less than 2 seconds.   Neurological:      General: No focal deficit present.      Mental Status: He is alert.         Significant Labs:  CBC:  Recent Labs   Lab 05/24/22  1237 05/24/22  1607 05/25/22  0336   WBC 8.52 8.50 10.27   RBC 4.93 4.71 4.88   HGB 15.0 14.4 14.8   HCT 43.1 41.1 41.1    305 257   MCV 87 87 84   MCH 30.4 30.6 30.3   MCHC 34.8 35.0 36.0     BNP:  Recent Labs   Lab 05/24/22  1237   BNP 49     CMP:  Recent Labs   Lab 05/20/22  1203 05/24/22  1237 05/24/22  1607 05/24/22 2023 05/25/22  0336 05/25/22  0745   GLU  --    < > 784* 168* 357* 264*   CALCIUM 10.7*   < > 10.1 10.7* 9.8 10.1   ALBUMIN 4.4  --  4.2  --   --   --    PROT  --   --  8.5*  --   --   --    *   < > 120* 135* 130* 132*   K 4.0   < > 4.3 3.0* 4.5 3.8   CO2 28   < > 23 27 23 29   CL  --    < > 83* 92* 93* 92*   BUN 18.7   < > 36* 40* 40* 36*   CREATININE 2.02*   < > 2.8* 2.6* 2.5* 2.3*   ALKPHOS 103  --  110  --   --   --    ALT 32  --  26  --   --   --    AST 29  --  20  --   --   --    BILITOT 0.8  --  0.7  --   --   --     < > = values in this interval not displayed.      Coagulation:   No results for input(s): PT, INR, APTT in the last 168 hours.  LDH:  No results for input(s): LDH in the last 72 hours.  Microbiology:  Microbiology Results (last 7 days)        ** No results found for the last 168 hours. **            I have reviewed all pertinent labs within the past 24 hours.    Estimated Creatinine Clearance: 52.6 mL/min (A) (based on SCr of 2.3 mg/dL (H)).    Diagnostic Results:  I have reviewed and interpreted all pertinent imaging results/findings within the past 24 hours.    Assessment and Plan:     37 y.o. athletic  male with a past medical history remarkable for recently diagnosed NICM admitted with ADHF/cardiogenic shock on home milrinone. He presented with symptoms of feeling weak and fatigued. He is not measuring his blood glucose at home (doesn't have glucometer at home). His vitals was low normal in his blood pressure. Of note, he presented to [place] where received IV diuretics. He denies chest pain, dyspnea on exertion or loss of consciousness. He is establish care with Endocrinology for better diabetes control given HbA1c of 9.2% 5/20/2022. His HF medication are Entresto 49-51 BID, Aldactone 25 daily, and digoxin 0.125 mg daily, milrinone at 0.375 mcg/kg/min. Per note, he was approved for VAD (not [facility] due to smoking until admission) 4/20/2022. He has issues with shoulder being frozen at the location of ICD placement.       DKA (diabetic ketoacidosis)  -  Presents with DKA.   - Corrected Sodium is 135.  - Will use DKA protocol for repleting electrolytes (K and Phos)  -  gentle 0.9 NaCl bolus given his HFrEF    - Hold Entresto, Spironolactone and Dig given ROC  - BMP Q 6 hours while on insulin gtt for DKA    - Switch his Milirone to  at lower dose in one hour (to avoid accumulation of Milirone)   - Consult endocrine for further recommendation on Type I insulin   - Device check (ordered) [place] given his pain in the left shoulder     5/25  Endo on board. He is off insulin drip. On levemir and sliding scale.         CKD (chronic kidney disease) stage 3, GFR 30-59 ml/min  ROC on CKD stage 3  On presentation. Baseline around 2. Presents  with creatinine of 3.  Creatinine trended down to 2.3        Chronic combined systolic and diastolic congestive heart failure  On Dobutamine 5 mcg.  Will start entresto once his creatinine come below 2         Joaquín Guajardo MD  Heart Transplant  [facility] - Cardiology Stepdown

## 2022-05-25 NOTE — ASSESSMENT & PLAN NOTE
Patient presented on admission 5/24 with blood sugars elevated to 853. B-hydroxybutyrate negative, no anion gap, normal bicarbonate on labs. Prior to admission, patient states he had symptoms of polydipsia, polyuria for the past week. No signs or symptoms of infection noted.     He was recently seen in the ED on 5/20 for blood sugars >600. He was prescribed insulin Lantus 10 units on 5/20, however he did not receive full supplies with diabetic needles and glucose meter and has been unable to take insulin or check his blood sugar at home. He has a family history of diabetes (unknown type) with both parents. On review of labs, he had a hemoglobin A1c 6.8 in April 2021. He states he does not adhere to a diabetic diet, denies increased sugary drinks/sodas in the past few weeks.    Patient was started on insulin gtt on admission, transitioned to subcutaneous insulin 5/25.    See Diabetes management

## 2022-05-26 LAB
ALBUMIN SERPL BCP-MCNC: 3.9 G/DL (ref 3.5–5.2)
ALP SERPL-CCNC: 92 U/L (ref 55–135)
ALT SERPL W/O P-5'-P-CCNC: 28 U/L (ref 10–44)
AMPHET+METHAMPHET UR QL: NEGATIVE
ANION GAP SERPL CALC-SCNC: 11 MMOL/L (ref 8–16)
AST SERPL-CCNC: 31 U/L (ref 10–40)
BARBITURATES UR QL SCN>200 NG/ML: NEGATIVE
BASOPHILS # BLD AUTO: 0.04 K/UL (ref 0–0.2)
BASOPHILS NFR BLD: 0.5 % (ref 0–1.9)
BENZODIAZ UR QL SCN>200 NG/ML: NEGATIVE
BILIRUB SERPL-MCNC: 0.5 MG/DL (ref 0.1–1)
BUN SERPL-MCNC: 30 MG/DL (ref 6–20)
BZE UR QL SCN: NEGATIVE
CALCIUM SERPL-MCNC: 10.4 MG/DL (ref 8.7–10.5)
CANNABINOIDS UR QL SCN: NEGATIVE
CHLORIDE SERPL-SCNC: 97 MMOL/L (ref 95–110)
CO2 SERPL-SCNC: 26 MMOL/L (ref 23–29)
CREAT SERPL-MCNC: 1.9 MG/DL (ref 0.5–1.4)
CREAT UR-MCNC: 122 MG/DL (ref 23–375)
DIFFERENTIAL METHOD: ABNORMAL
EOSINOPHIL # BLD AUTO: 0.1 K/UL (ref 0–0.5)
EOSINOPHIL NFR BLD: 1.4 % (ref 0–8)
ERYTHROCYTE [DISTWIDTH] IN BLOOD BY AUTOMATED COUNT: 12.4 % (ref 11.5–14.5)
EST. GFR  (AFRICAN AMERICAN): 50.9 ML/MIN/1.73 M^2
EST. GFR  (NON AFRICAN AMERICAN): 44.1 ML/MIN/1.73 M^2
GLUCOSE SERPL-MCNC: 346 MG/DL (ref 70–110)
HCT VFR BLD AUTO: 39.8 % (ref 40–54)
HGB BLD-MCNC: 13.9 G/DL (ref 14–18)
IMM GRANULOCYTES # BLD AUTO: 0.02 K/UL (ref 0–0.04)
IMM GRANULOCYTES NFR BLD AUTO: 0.3 % (ref 0–0.5)
LYMPHOCYTES # BLD AUTO: 2.1 K/UL (ref 1–4.8)
LYMPHOCYTES NFR BLD: 28.9 % (ref 18–48)
MCH RBC QN AUTO: 31 PG (ref 27–31)
MCHC RBC AUTO-ENTMCNC: 34.9 G/DL (ref 32–36)
MCV RBC AUTO: 89 FL (ref 82–98)
METHADONE UR QL SCN>300 NG/ML: NEGATIVE
MONOCYTES # BLD AUTO: 0.7 K/UL (ref 0.3–1)
MONOCYTES NFR BLD: 9.5 % (ref 4–15)
NEUTROPHILS # BLD AUTO: 4.3 K/UL (ref 1.8–7.7)
NEUTROPHILS NFR BLD: 59.4 % (ref 38–73)
NRBC BLD-RTO: 0 /100 WBC
OPIATES UR QL SCN: NEGATIVE
PCP UR QL SCN>25 NG/ML: NEGATIVE
PLATELET # BLD AUTO: 282 K/UL (ref 150–450)
PMV BLD AUTO: 11 FL (ref 9.2–12.9)
POCT GLUCOSE: 319 MG/DL (ref 70–110)
POCT GLUCOSE: 357 MG/DL (ref 70–110)
POTASSIUM SERPL-SCNC: 4.3 MMOL/L (ref 3.5–5.1)
PROT SERPL-MCNC: 8 G/DL (ref 6–8.4)
RBC # BLD AUTO: 4.48 M/UL (ref 4.6–6.2)
SODIUM SERPL-SCNC: 134 MMOL/L (ref 136–145)
TOXICOLOGY INFORMATION: NORMAL
WBC # BLD AUTO: 7.3 K/UL (ref 3.9–12.7)

## 2022-05-26 PROCEDURE — 80323 ALKALOIDS NOS: CPT | Mod: NTX | Performed by: INTERNAL MEDICINE

## 2022-05-26 PROCEDURE — 63600175 PHARM REV CODE 636 W HCPCS: Mod: JG,NTX | Performed by: STUDENT IN AN ORGANIZED HEALTH CARE EDUCATION/TRAINING PROGRAM

## 2022-05-26 PROCEDURE — 63600175 PHARM REV CODE 636 W HCPCS: Mod: NTX | Performed by: HOSPITALIST

## 2022-05-26 PROCEDURE — 25000003 PHARM REV CODE 250: Mod: NTX | Performed by: STUDENT IN AN ORGANIZED HEALTH CARE EDUCATION/TRAINING PROGRAM

## 2022-05-26 PROCEDURE — 80053 COMPREHEN METABOLIC PANEL: CPT | Mod: NTX | Performed by: HOSPITALIST

## 2022-05-26 PROCEDURE — 80307 DRUG TEST PRSMV CHEM ANLYZR: CPT | Mod: NTX | Performed by: INTERNAL MEDICINE

## 2022-05-26 PROCEDURE — 99232 PR SUBSEQUENT HOSPITAL CARE,LEVL II: ICD-10-PCS | Mod: NTX,,, | Performed by: INTERNAL MEDICINE

## 2022-05-26 PROCEDURE — 99233 SBSQ HOSP IP/OBS HIGH 50: CPT | Mod: NTX,,, | Performed by: INTERNAL MEDICINE

## 2022-05-26 PROCEDURE — C9399 UNCLASSIFIED DRUGS OR BIOLOG: HCPCS | Mod: NTX | Performed by: STUDENT IN AN ORGANIZED HEALTH CARE EDUCATION/TRAINING PROGRAM

## 2022-05-26 PROCEDURE — 85025 COMPLETE CBC W/AUTO DIFF WBC: CPT | Mod: NTX | Performed by: INTERNAL MEDICINE

## 2022-05-26 PROCEDURE — 99233 PR SUBSEQUENT HOSPITAL CARE,LEVL III: ICD-10-PCS | Mod: NTX,,, | Performed by: INTERNAL MEDICINE

## 2022-05-26 PROCEDURE — 20600001 HC STEP DOWN PRIVATE ROOM: Mod: NTX

## 2022-05-26 PROCEDURE — 99232 SBSQ HOSP IP/OBS MODERATE 35: CPT | Mod: NTX,,, | Performed by: INTERNAL MEDICINE

## 2022-05-26 PROCEDURE — 63600175 PHARM REV CODE 636 W HCPCS: Mod: NTX | Performed by: STUDENT IN AN ORGANIZED HEALTH CARE EDUCATION/TRAINING PROGRAM

## 2022-05-26 PROCEDURE — 36415 COLL VENOUS BLD VENIPUNCTURE: CPT | Mod: NTX | Performed by: INTERNAL MEDICINE

## 2022-05-26 RX ORDER — MILRINONE LACTATE 0.2 MG/ML
0.38 INJECTION, SOLUTION INTRAVENOUS CONTINUOUS
Status: DISCONTINUED | OUTPATIENT
Start: 2022-05-26 | End: 2022-05-27 | Stop reason: HOSPADM

## 2022-05-26 RX ORDER — INSULIN ASPART 100 [IU]/ML
10 INJECTION, SOLUTION INTRAVENOUS; SUBCUTANEOUS
Status: DISCONTINUED | OUTPATIENT
Start: 2022-05-26 | End: 2022-05-26

## 2022-05-26 RX ORDER — INSULIN ASPART 100 [IU]/ML
12 INJECTION, SOLUTION INTRAVENOUS; SUBCUTANEOUS
Status: DISCONTINUED | OUTPATIENT
Start: 2022-05-27 | End: 2022-05-27 | Stop reason: HOSPADM

## 2022-05-26 RX ADMIN — INSULIN ASPART 10 UNITS: 100 INJECTION, SOLUTION INTRAVENOUS; SUBCUTANEOUS at 04:05

## 2022-05-26 RX ADMIN — INSULIN ASPART 10 UNITS: 100 INJECTION, SOLUTION INTRAVENOUS; SUBCUTANEOUS at 09:05

## 2022-05-26 RX ADMIN — ALTEPLASE 2 MG: 2.2 INJECTION, POWDER, LYOPHILIZED, FOR SOLUTION INTRAVENOUS at 07:05

## 2022-05-26 RX ADMIN — DOBUTAMINE HYDROCHLORIDE 5 MCG/KG/MIN: 400 INJECTION INTRAVENOUS at 02:05

## 2022-05-26 RX ADMIN — INSULIN ASPART 4 UNITS: 100 INJECTION, SOLUTION INTRAVENOUS; SUBCUTANEOUS at 04:05

## 2022-05-26 RX ADMIN — INSULIN DETEMIR 15 UNITS: 100 INJECTION, SOLUTION SUBCUTANEOUS at 08:05

## 2022-05-26 RX ADMIN — INSULIN ASPART 8 UNITS: 100 INJECTION, SOLUTION INTRAVENOUS; SUBCUTANEOUS at 08:05

## 2022-05-26 RX ADMIN — INSULIN DETEMIR 5 UNITS: 100 INJECTION, SOLUTION SUBCUTANEOUS at 08:05

## 2022-05-26 RX ADMIN — INSULIN ASPART 4 UNITS: 100 INJECTION, SOLUTION INTRAVENOUS; SUBCUTANEOUS at 08:05

## 2022-05-26 RX ADMIN — MILRINONE LACTATE IN DEXTROSE 0.38 MCG/KG/MIN: 200 INJECTION, SOLUTION INTRAVENOUS at 11:05

## 2022-05-26 RX ADMIN — MILRINONE LACTATE IN DEXTROSE 0.38 MCG/KG/MIN: 200 INJECTION, SOLUTION INTRAVENOUS at 06:05

## 2022-05-26 RX ADMIN — INSULIN ASPART 8 UNITS: 100 INJECTION, SOLUTION INTRAVENOUS; SUBCUTANEOUS at 11:05

## 2022-05-26 RX ADMIN — INSULIN ASPART 10 UNITS: 100 INJECTION, SOLUTION INTRAVENOUS; SUBCUTANEOUS at 11:05

## 2022-05-26 NOTE — PROGRESS NOTES
"[facility] - Cardiology Stepdown  Endocrinology  Progress Note    Admit Date: 2022     Reason for Consult: Management of T2DM, Hyperglycemia         Diabetes diagnosis year: 21 HbA1c 6.8; repeat A1c on  was 9.8    Home Diabetes Medications:  Prescribed lantus 10 units on  - not taking as patient did not have needles to administer insulin at home    How often checking glucose at home?  Patient has not been checking blood sugars at home as he did not have a glucose meter     BG readings on regimen: NA/unknown  Hypoglycemia on the regimen?  No  Missed doses on regimen?  No    Diabetes Complications include:     Hyperglycemia    Complicating diabetes co morbidities:   History of MI, CHF, and CKD      HPI:   Patient is a 37 y.o. male with a diagnosis of NICM, diastolic CHF (EF 15%), s/p ICD, CKD stage III admitted for acute decompensated CHF and found to be in HHS on admission. On admission, blood sugar elevated to 853 with no ketoacidosis, anion gap noted on labs. Patient noted significant polydipsia and polyuria prior to admission. Patient was started on insulin gtt with IV fluids. Patient being treated for CHF by CICU and currently on dobutamine gtt. Endocrinology consulted regarding HHS and new onset diabetes management.         Interval HPI:   Improving diet, 100%, increasing insulin requirements  Adjusting insulin accordingly for glucose trend 272-346   Overnight events: none  Eatin%  Nausea: No  Hypoglycemia and intervention: No  Fever: No  TPN and/or TF: No  If yes, type of TF/TPN and rate: na    BP (!) 98/56 (BP Location: Left arm, Patient Position: Lying)   Pulse 89   Temp 98.1 °F (36.7 °C) (Oral)   Resp 20   Ht 6' 3" (1.905 m)   Wt 93.7 kg (206 lb 9.1 oz)   SpO2 96%   BMI 25.82 kg/m²     Labs Reviewed and Include    Recent Labs   Lab 22  0823   *   CALCIUM 10.4   ALBUMIN 3.9   PROT 8.0   *   K 4.3   CO2 26   CL 97   BUN 30*   CREATININE 1.9*   ALKPHOS 92   ALT " 28   AST 31   BILITOT 0.5     Lab Results   Component Value Date    WBC 7.30 05/26/2022    HGB 13.9 (L) 05/26/2022    HCT 39.8 (L) 05/26/2022    MCV 89 05/26/2022     05/26/2022     No results for input(s): TSH, FREET4 in the last 168 hours.  Lab Results   Component Value Date    HGBA1C 9.2 (H) 05/20/2022       Nutritional status:   Body mass index is 25.82 kg/m².  Lab Results   Component Value Date    ALBUMIN 3.9 05/26/2022    ALBUMIN 4.2 05/24/2022    ALBUMIN 4.4 05/20/2022     Lab Results   Component Value Date    PREALBUMIN 36 04/18/2022       Estimated Creatinine Clearance: 63.6 mL/min (A) (based on SCr of 1.9 mg/dL (H)).    Accu-Checks  Recent Labs     05/24/22  2351 05/25/22  0125 05/25/22  0335 05/25/22  0448 05/25/22  0622 05/25/22  0745 05/25/22  1139 05/25/22  1517 05/25/22  1713 05/25/22 2001   POCTGLUCOSE 169* 205* 345* 337* 289* 276* 310* 276* 272* 387*       Current Medications and/or Treatments Impacting Glycemic Control  Immunotherapy:    Immunosuppressants       None          Steroids:   Hormones (From admission, onward)                None          Pressors:    Autonomic Drugs (From admission, onward)                None          Hyperglycemia/Diabetes Medications:   Antihyperglycemics (From admission, onward)                Start     Stop Route Frequency Ordered    05/27/22 0900  insulin detemir U-100 pen 20 Units         -- SubQ Daily 05/26/22 0832    05/26/22 1130  insulin aspart U-100 pen 10 Units         -- SubQ 3 times daily with meals 05/26/22 0832    05/25/22 1111  insulin aspart U-100 pen 1-10 Units         -- SubQ Before meals & nightly PRN 05/25/22 1013            ASSESSMENT and PLAN    Uncontrolled diabetes mellitus  Consulted for:   [facility]  DM type:  T2D diagnosed 4/2021 w/o h/o med use  A1C: 9.2  Hypoglycemia:  none  Steroids:   none  Diet/Tfs:    100%  Glucose Goals:  140-180 mg/dL    Glucose Trend x 24hr:    Above goal glucose trend 272-346    Home Regimen:    None at this  time, was prescribed lantus 10 units daily 5/20  Total daily dose weight based approximately 46 units    Increasing insulin requirements, improved appetite and PO intake    PLAN:  -  Pending QUINTIN  -  Increase Detemir  20  units daily  -  Increase Aspart   10  units TIDWM, increase today pending po intake  -  Mod dose correction insulin  -  Accucheck achs2      Hyperosmolar hyperglycemic state (HHS)  Resolving  Patient was started on insulin gtt on admission, transitioned to subcutaneous insulin 5/25.    See Diabetes management      CKD (chronic kidney disease) stage 3, GFR 30-59 ml/min  Caution with aggressive insulin adjustments to reduce risk of hypoglycemia  Titrate insulin slowly to avoid hypoglycemia as the risk of hypoglycemia increases with decreased creatinine clearance.      Chronic combined systolic and diastolic congestive heart failure  Mr. Queen is a 37 year old man with history of NICM admitted with ADHF/cardiogenic shock on home milrinone. He presented with symptoms of feeling weak and fatigued. Most recent echo shows EF 15% with reduced systolic and diastolic dysfunction. Patient started on dobutamine drip per CICU.     -manage per primary team (CICU)        Thong Griffith MD  Endocrinology  [facility] - [facility]

## 2022-05-26 NOTE — ASSESSMENT & PLAN NOTE
Consulted for:   [facility]  DM type:  T2D diagnosed 4/2021 w/o h/o med use  A1C: 9.2  Hypoglycemia:  none  Steroids:   none  Diet/Tfs:    100%  Glucose Goals:  140-180 mg/dL    Glucose Trend x 24hr:    Above goal glucose trend 272-346    Home Regimen:    None at this time, was prescribed lantus 10 units daily 5/20  Total daily dose weight based approximately 46 units    Increasing insulin requirements, improved appetite and PO intake    PLAN:  -  Pending QUINTIN  -  Increase Detemir  20  units daily  -  Increase Aspart   10  units TIDWM, increase today pending po intake  -  Mod dose correction insulin  -  Accyesenia achs2

## 2022-05-26 NOTE — PLAN OF CARE
[facility]   Heart Transplant Clinic  [address] [place] [address]   [phone] after hours        HOME  HEALTH ORDERS      Admit to Home Health    Diagnosis:   Patient Active Problem List   Diagnosis    Anxiety disorder, unspecified    Dilated cardiomyopathy    Anemia of chronic disease    ICD (implantable cardioverter-defibrillator) in place    Ecstasy abuse    Cannabis use disorder, severe, dependence    Chronic combined systolic and diastolic congestive heart failure    Tobacco use    CKD (chronic kidney disease) stage 3, GFR 30-59 ml/min    DKA (diabetic ketoacidosis)    Hyperosmolar hyperglycemic state (HHS)    Uncontrolled diabetes mellitus       Patient is homebound due to:   Diet: Low Sodium  Acitivities: As Tolerated    Nursing:   SN to complete comprehensive assessment including routine vital signs. Instruct on disease process and s/s of complications to report to MD. Review/verify medication list sent home with the patient at time of discharge  and instruct patient/caregiver as needed. Frequency may be adjusted depending on start of care date.    Notify MD if SBP > 160 or < 90; DBP > 90 or < 50; HR > 120 or < 50; Temp > 101; Weight gain >3lbs in 1 day or 5lbs in 1 week.    LABS:  SN to perform labs: on 5/30    HOME INFUSION THERAPY:   SN to perform Infusion Therapy/Central Line Care.  Review Central Line Care & Central Line Flush with patient.    Administer (drug and dose):  Milrinone 0.375 mcg/kg/min  Dosing weight 93.7 kg    **For questions or concerns, please call [phone] and ask for Pre-Heart transplant clinic, M-F 8-5. After hours, weekends, call [phone] and ask for the Heart Transplant Cardiologist on call.**    Central line care:  Scrub the Hub: Prior to accessing the line, always perform a 30 second alcohol scrub  Each lumen of the central line is to be flushed at least daily with 10 mL Normal Saline and 3 mL  T/c from pt  She was directed to either be seen asap or go to u/c because her BP is elevated to 180/100    Offered earlier appt but pt said she would go to u/c Heparin flush (100 units/mL)  Skilled Nurse (SN) may draw blood from IV access  Blood Draw Procedure:   - Aspirate at least 5 mL of blood   - Discard   - Obtain specimen   - Change posiflow cap   - Flush with 20 mL Normal Saline followed by a                 3-5 mL Heparin flush (100 units/mL)  Central :   - Sterile dressing changes are done weekly and as needed.   - Use chlor-hexadine scrub to cleanse site, apply Biopatch to insertion site,       apply securement device dressing   - Posi-flow caps are changed weekly and after EVERY lab draw.   - If sterile gauze is under dressing to control oozing,                 dressing change must be performed every 24 hours until gauze is not needed.                                      to evaluate for community resources/long-range planning.     Send initial Home Health orders to HTS attending physician on call.  Send follow up questions to [phone] or fax:               Heart Failure:      [phone]

## 2022-05-26 NOTE — ASSESSMENT & PLAN NOTE
Mr. Queen is a 37 year old man with history of NICM admitted with ADHF/cardiogenic shock on home milrinone. He presented with symptoms of feeling weak and fatigued. Most recent echo shows EF 15% with reduced systolic and diastolic dysfunction. Patient started on dobutamine drip per CICU.     -manage per primary team (CICU)

## 2022-05-26 NOTE — PROGRESS NOTES
Interdisciplinary Rounds Report:   Attended interdisciplinary rounds with the [facility]/CTS services including the LVAD Coordinators, social workers, cardiologists, surgeons,  PT/OT/Speech, dietician, and unit charge nurses. Discussed patient status, plan of care, goals of care, including DC date, and post discharge needs. Plan of care will be discussed with the patient and/or family per the physician while rounding on the floor. This is a recurring meeting that is medically and socially necessary to collaborate with the interdisciplinary team to assist patient needs and safe discharge.

## 2022-05-26 NOTE — PROGRESS NOTES
05/26/22 1618   Vital Signs   Pulse 89   Heart Rate Source Monitor   O2 Device (Oxygen Therapy) room air   BP (!) 87/50   MAP (mmHg) 61   BP Location Left arm   BP Method Automatic   Patient Position Lying        Cardiac/Telemetry Details / Alarms   Cardiac/Telemetry Monitor On Yes   Cardiac/Telemetry Audible Yes   Cardiac/Telemetry Alarms Set Yes   Assessments (Pre/Post)   Level of Consciousness (AVPU) alert     Provider with HTS notified. No symptoms. No further orders.

## 2022-05-26 NOTE — PLAN OF CARE
Problem: Adult Inpatient Plan of Care  Goal: Patient-Specific Goal (Individualized)  Outcome: Ongoing, Progressing  Flowsheets (Taken 5/26/2022 3544)  Anxieties, Fears or Concerns: None voiced  Individualized Care Needs: Monitor Electrolytes. Monitor milrinone gtt.PPt weaned off Dobutamine gtt today 05/26. Maintain BG protocol.  Patient-Specific Goals (Include Timeframe): Pt will be free of falls and injuries throughout shift.       Plan of care discussed with patient.  Patient ambulating independently, fall precautions in place.Patient has no complaints of pain. Discussed medications and care. Patient has no questions at this time. Will continue to monitor throughout shift.

## 2022-05-26 NOTE — PROGRESS NOTES
[facility]  Heart Transplant  Progress Note    Patient Name: Kevan Queen  MRN: [id number]  Admission Date: 5/24/2022  Hospital Length of Stay: 2 days  Attending Physician: Luca Lopez Jr.,*  Primary Care Provider: ORALIA Cline  Principal Problem:<principal problem not specified>    Subjective:     Interval History: No events overnight. Doing better thsi morning. Creatinine trending down and this morning it is 1.9     Continuous Infusions:   DOBUTamine IV infusion (non-titrating) 2.5 mcg/kg/min (05/26/22 1301)    milrinone 20mg/100ml D5W (200mcg/ml) 0.375 mcg/kg/min (05/26/22 1106)     Scheduled Meds:   insulin aspart U-100  10 Units Subcutaneous TIDWM    [START ON 5/27/2022] insulin detemir U-100  20 Units Subcutaneous Daily    mupirocin   Nasal BID     PRN Meds:dextrose 10 % in water (D10W), dextrose 10 % in water (D10W), dextrose 10%, dextrose 10%, glucagon (human recombinant), glucose, glucose, insulin aspart U-100, sodium chloride 0.9%, sodium chloride 0.9%    Review of patient's allergies indicates:   Allergen Reactions    Bumex [bumetanide] Hives    Lactose Diarrhea     Other reaction(s): Abdominal distension, gaseous    Torsemide Hives     Objective:     Vital Signs (Most Recent):  Temp: 97.4 °F (36.3 °C) (05/26/22 1121)  Pulse: 86 (05/26/22 1406)  Resp: 18 (05/26/22 1121)  BP: (!) 93/54 (05/26/22 1406)  SpO2: 99 % (05/26/22 1121)   Vital Signs (24h Range):  Temp:  [97.3 °F (36.3 °C)-98.3 °F (36.8 °C)] 97.4 °F (36.3 °C)  Pulse:  [] 86  Resp:  [1-20] 18  SpO2:  [96 %-99 %] 99 %  BP: ()/(54-73) 93/54     Patient Vitals for the past 72 hrs (Last 3 readings):   Weight   05/25/22 0500 93.7 kg (206 lb 9.1 oz)   05/25/22 0000 93.7 kg (206 lb 9.1 oz)   05/24/22 1945 93.7 kg (206 lb 9.1 oz)     Body mass index is 25.82 kg/m².      Intake/Output Summary (Last 24 hours) at 5/26/2022 1506  Last data filed at 5/26/2022 1245  Gross per 24 hour   Intake 702 ml   Output  2375 ml   Net -1673 ml       Hemodynamic Parameters:       Telemetry: NSR    Physical Exam  Constitutional:       Appearance: Normal appearance.   HENT:      Head: Normocephalic.      Mouth/Throat:      Mouth: Mucous membranes are dry.   Eyes:      Extraocular Movements: Extraocular movements intact.      Pupils: Pupils are equal, round, and reactive to light.   Cardiovascular:      Rate and Rhythm: Normal rate and regular rhythm.   Pulmonary:      Effort: Pulmonary effort is normal.      Breath sounds: Normal breath sounds.   Abdominal:      General: Abdomen is flat. Bowel sounds are normal.      Palpations: Abdomen is soft.   Musculoskeletal:         General: Normal range of motion.      Cervical back: Normal range of motion.   Skin:     General: Skin is warm and dry.      Capillary Refill: Capillary refill takes less than 2 seconds.   Neurological:      General: No focal deficit present.      Mental Status: He is alert.   Significant Labs:  CBC:  Recent Labs   Lab 05/24/22  1607 05/25/22  0336 05/26/22  0246   WBC 8.50 10.27 7.30   RBC 4.71 4.88 4.48*   HGB 14.4 14.8 13.9*   HCT 41.1 41.1 39.8*    257 282   MCV 87 84 89   MCH 30.6 30.3 31.0   MCHC 35.0 36.0 34.9     BNP:  Recent Labs   Lab 05/24/22  1237   BNP 49     CMP:  Recent Labs   Lab 05/20/22  1203 05/24/22  1237 05/24/22  1607 05/24/22  2023 05/25/22  0336 05/25/22  0745 05/26/22  0823   GLU  --    < > 784*   < > 357* 264* 346*   CALCIUM 10.7*   < > 10.1   < > 9.8 10.1 10.4   ALBUMIN 4.4  --  4.2  --   --   --  3.9   PROT  --   --  8.5*  --   --   --  8.0   *   < > 120*   < > 130* 132* 134*   K 4.0   < > 4.3   < > 4.5 3.8 4.3   CO2 28   < > 23   < > 23 29 26   CL  --    < > 83*   < > 93* 92* 97   BUN 18.7   < > 36*   < > 40* 36* 30*   CREATININE 2.02*   < > 2.8*   < > 2.5* 2.3* 1.9*   ALKPHOS 103  --  110  --   --   --  92   ALT 32  --  26  --   --   --  28   AST 29  --  20  --   --   --  31   BILITOT 0.8  --  0.7  --   --   --  0.5    < > =  values in this interval not displayed.      Coagulation:   No results for input(s): PT, INR, APTT in the last 168 hours.  LDH:  No results for input(s): LDH in the last 72 hours.  Microbiology:  Microbiology Results (last 7 days)       ** No results found for the last 168 hours. **            I have reviewed all pertinent labs within the past 24 hours.    Estimated Creatinine Clearance: 63.6 mL/min (A) (based on SCr of 1.9 mg/dL (H)).    Diagnostic Results:  I have reviewed and interpreted all pertinent imaging results/findings within the past 24 hours.    Assessment and Plan:     37 y.o. athletic  male with a past medical history remarkable for recently diagnosed NICM admitted with ADHF/cardiogenic shock on home milrinone. He presented with symptoms of feeling weak and fatigued. He is not measuring his blood glucose at home (doesn't have glucometer at home). His vitals was low normal in his blood pressure. Of note, he presented to [place] where received IV diuretics. He denies chest pain, dyspnea on exertion or loss of consciousness. He is establish care with Endocrinology for better diabetes control given HbA1c of 9.2% 5/20/2022. His HF medication are Entresto 49-51 BID, Aldactone 25 daily, and digoxin 0.125 mg daily, milrinone at 0.375 mcg/kg/min. Per note, he was approved for VAD (not [facility] due to smoking until admission) 4/20/2022. He has issues with shoulder being frozen at the location of ICD placement.       DKA (diabetic ketoacidosis)  -  Presents with DKA.   - Corrected Sodium is 135.  - Will use DKA protocol for repleting electrolytes (K and Phos)  -  gentle 0.9 NaCl bolus given his HFrEF    - Hold Entresto, Spironolactone and Dig given ROC  - BMP Q 6 hours while on insulin gtt for DKA    - Switch his Milirone to  at lower dose in one hour (to avoid accumulation of Milirone)   - Consult endocrine for further recommendation on Type I insulin   - Device check (ordered) [place] given his  pain in the left shoulder     5/25  Endo on board. He is off insulin drip. On levemir and sliding scale.         CKD (chronic kidney disease) stage 3, GFR 30-59 ml/min  ROC on CKD stage 3  On presentation. Baseline around 2. Presents with creatinine of 3.  Creatinine trended down to 2.3        Chronic combined systolic and diastolic congestive heart failure  On Dobutamine 5 mcg. Will switch him to Milrinone and DC dobutamine.Will start entresto tomorrow morning       Joaquín Guajardo MD  Heart Transplant  [facility] - [facility]

## 2022-05-26 NOTE — SUBJECTIVE & OBJECTIVE
"Interval HPI:   Improving diet, 100%, increasing insulin requirements  Adjusting insulin accordingly for glucose trend 272-346   Overnight events: none  Eatin%  Nausea: No  Hypoglycemia and intervention: No  Fever: No  TPN and/or TF: No  If yes, type of TF/TPN and rate: na    BP (!) 98/56 (BP Location: Left arm, Patient Position: Lying)   Pulse 89   Temp 98.1 °F (36.7 °C) (Oral)   Resp 20   Ht 6' 3" (1.905 m)   Wt 93.7 kg (206 lb 9.1 oz)   SpO2 96%   BMI 25.82 kg/m²     Labs Reviewed and Include    Recent Labs   Lab 22  0823   *   CALCIUM 10.4   ALBUMIN 3.9   PROT 8.0   *   K 4.3   CO2 26   CL 97   BUN 30*   CREATININE 1.9*   ALKPHOS 92   ALT 28   AST 31   BILITOT 0.5     Lab Results   Component Value Date    WBC 7.30 2022    HGB 13.9 (L) 2022    HCT 39.8 (L) 2022    MCV 89 2022     2022     No results for input(s): TSH, FREET4 in the last 168 hours.  Lab Results   Component Value Date    HGBA1C 9.2 (H) 2022       Nutritional status:   Body mass index is 25.82 kg/m².  Lab Results   Component Value Date    ALBUMIN 3.9 2022    ALBUMIN 4.2 2022    ALBUMIN 4.4 2022     Lab Results   Component Value Date    PREALBUMIN 36 2022       Estimated Creatinine Clearance: 63.6 mL/min (A) (based on SCr of 1.9 mg/dL (H)).    Accu-Checks  Recent Labs     22  2351 22  0125 22  0335 22  0448 22  0622 22  0745 22  1139 22  1517 22  1713 22   POCTGLUCOSE 169* 205* 345* 337* 289* 276* 310* 276* 272* 387*       Current Medications and/or Treatments Impacting Glycemic Control  Immunotherapy:    Immunosuppressants       None          Steroids:   Hormones (From admission, onward)                None          Pressors:    Autonomic Drugs (From admission, onward)                None          Hyperglycemia/Diabetes Medications:   Antihyperglycemics (From admission, onward)       "          Start     Stop Route Frequency Ordered    05/27/22 0900  insulin detemir U-100 pen 20 Units         -- SubQ Daily 05/26/22 0832    05/26/22 1130  insulin aspart U-100 pen 10 Units         -- SubQ 3 times daily with meals 05/26/22 0832    05/25/22 1111  insulin aspart U-100 pen 1-10 Units         -- SubQ Before meals & nightly PRN 05/25/22 1013

## 2022-05-26 NOTE — SUBJECTIVE & OBJECTIVE
Interval History: No events overnight. Doing better thsi morning. Creatinine trending down and this morning it is 1.9     Continuous Infusions:   DOBUTamine IV infusion (non-titrating) 2.5 mcg/kg/min (05/26/22 1301)    milrinone 20mg/100ml D5W (200mcg/ml) 0.375 mcg/kg/min (05/26/22 1106)     Scheduled Meds:   insulin aspart U-100  10 Units Subcutaneous TIDWM    [START ON 5/27/2022] insulin detemir U-100  20 Units Subcutaneous Daily    mupirocin   Nasal BID     PRN Meds:dextrose 10 % in water (D10W), dextrose 10 % in water (D10W), dextrose 10%, dextrose 10%, glucagon (human recombinant), glucose, glucose, insulin aspart U-100, sodium chloride 0.9%, sodium chloride 0.9%    Review of patient's allergies indicates:   Allergen Reactions    Bumex [bumetanide] Hives    Lactose Diarrhea     Other reaction(s): Abdominal distension, gaseous    Torsemide Hives     Objective:     Vital Signs (Most Recent):  Temp: 97.4 °F (36.3 °C) (05/26/22 1121)  Pulse: 86 (05/26/22 1406)  Resp: 18 (05/26/22 1121)  BP: (!) 93/54 (05/26/22 1406)  SpO2: 99 % (05/26/22 1121)   Vital Signs (24h Range):  Temp:  [97.3 °F (36.3 °C)-98.3 °F (36.8 °C)] 97.4 °F (36.3 °C)  Pulse:  [] 86  Resp:  [1-20] 18  SpO2:  [96 %-99 %] 99 %  BP: ()/(54-73) 93/54     Patient Vitals for the past 72 hrs (Last 3 readings):   Weight   05/25/22 0500 93.7 kg (206 lb 9.1 oz)   05/25/22 0000 93.7 kg (206 lb 9.1 oz)   05/24/22 1945 93.7 kg (206 lb 9.1 oz)     Body mass index is 25.82 kg/m².      Intake/Output Summary (Last 24 hours) at 5/26/2022 1506  Last data filed at 5/26/2022 1245  Gross per 24 hour   Intake 702 ml   Output 2375 ml   Net -1673 ml       Hemodynamic Parameters:       Telemetry: NSR    Physical Exam  Constitutional:       Appearance: Normal appearance.   HENT:      Head: Normocephalic.      Mouth/Throat:      Mouth: Mucous membranes are dry.   Eyes:      Extraocular Movements: Extraocular movements intact.      Pupils: Pupils are equal, round,  and reactive to light.   Cardiovascular:      Rate and Rhythm: Normal rate and regular rhythm.   Pulmonary:      Effort: Pulmonary effort is normal.      Breath sounds: Normal breath sounds.   Abdominal:      General: Abdomen is flat. Bowel sounds are normal.      Palpations: Abdomen is soft.   Musculoskeletal:         General: Normal range of motion.      Cervical back: Normal range of motion.   Skin:     General: Skin is warm and dry.      Capillary Refill: Capillary refill takes less than 2 seconds.   Neurological:      General: No focal deficit present.      Mental Status: He is alert.   Significant Labs:  CBC:  Recent Labs   Lab 05/24/22  1607 05/25/22  0336 05/26/22  0246   WBC 8.50 10.27 7.30   RBC 4.71 4.88 4.48*   HGB 14.4 14.8 13.9*   HCT 41.1 41.1 39.8*    257 282   MCV 87 84 89   MCH 30.6 30.3 31.0   MCHC 35.0 36.0 34.9     BNP:  Recent Labs   Lab 05/24/22  1237   BNP 49     CMP:  Recent Labs   Lab 05/20/22  1203 05/24/22  1237 05/24/22  1607 05/24/22  2023 05/25/22  0336 05/25/22  0745 05/26/22  0823   GLU  --    < > 784*   < > 357* 264* 346*   CALCIUM 10.7*   < > 10.1   < > 9.8 10.1 10.4   ALBUMIN 4.4  --  4.2  --   --   --  3.9   PROT  --   --  8.5*  --   --   --  8.0   *   < > 120*   < > 130* 132* 134*   K 4.0   < > 4.3   < > 4.5 3.8 4.3   CO2 28   < > 23   < > 23 29 26   CL  --    < > 83*   < > 93* 92* 97   BUN 18.7   < > 36*   < > 40* 36* 30*   CREATININE 2.02*   < > 2.8*   < > 2.5* 2.3* 1.9*   ALKPHOS 103  --  110  --   --   --  92   ALT 32  --  26  --   --   --  28   AST 29  --  20  --   --   --  31   BILITOT 0.8  --  0.7  --   --   --  0.5    < > = values in this interval not displayed.      Coagulation:   No results for input(s): PT, INR, APTT in the last 168 hours.  LDH:  No results for input(s): LDH in the last 72 hours.  Microbiology:  Microbiology Results (last 7 days)       ** No results found for the last 168 hours. **            I have reviewed all pertinent labs within the  past 24 hours.    Estimated Creatinine Clearance: 63.6 mL/min (A) (based on SCr of 1.9 mg/dL (H)).    Diagnostic Results:  I have reviewed and interpreted all pertinent imaging results/findings within the past 24 hours.

## 2022-05-26 NOTE — ASSESSMENT & PLAN NOTE
Resolving  Patient was started on insulin gtt on admission, transitioned to subcutaneous insulin 5/25.    See Diabetes management

## 2022-05-26 NOTE — NURSING
Pt is AAOx4. POC reviewed at bedside with pt and souse. VS stable, afebrile throughout shift.  Pt denies any chest pain or palpitation, no s/s of respiratory distress.  Pt has remained free from injury during this shift. Pt bed in low locked position. Independent in room. Call light and personal belongings within reach. Side rails up x2.  Pt was advice to call nurse with any questions or concerns. Pt on Dobutamine Drip. Will continue to monitor.

## 2022-05-27 VITALS
OXYGEN SATURATION: 97 % | DIASTOLIC BLOOD PRESSURE: 80 MMHG | SYSTOLIC BLOOD PRESSURE: 111 MMHG | HEIGHT: 75 IN | HEART RATE: 100 BPM | BODY MASS INDEX: 25.68 KG/M2 | WEIGHT: 206.56 LBS | RESPIRATION RATE: 19 BRPM | TEMPERATURE: 98 F

## 2022-05-27 LAB
ANION GAP SERPL CALC-SCNC: 11 MMOL/L (ref 8–16)
BASOPHILS # BLD AUTO: 0.04 K/UL (ref 0–0.2)
BASOPHILS NFR BLD: 0.5 % (ref 0–1.9)
BUN SERPL-MCNC: 26 MG/DL (ref 6–20)
CALCIUM SERPL-MCNC: 10 MG/DL (ref 8.7–10.5)
CHLORIDE SERPL-SCNC: 101 MMOL/L (ref 95–110)
CO2 SERPL-SCNC: 24 MMOL/L (ref 23–29)
CREAT SERPL-MCNC: 1.5 MG/DL (ref 0.5–1.4)
DIFFERENTIAL METHOD: ABNORMAL
EOSINOPHIL # BLD AUTO: 0.2 K/UL (ref 0–0.5)
EOSINOPHIL NFR BLD: 2.4 % (ref 0–8)
ERYTHROCYTE [DISTWIDTH] IN BLOOD BY AUTOMATED COUNT: 13 % (ref 11.5–14.5)
EST. GFR  (AFRICAN AMERICAN): >60 ML/MIN/1.73 M^2
EST. GFR  (NON AFRICAN AMERICAN): 58.6 ML/MIN/1.73 M^2
GLUCOSE SERPL-MCNC: 191 MG/DL (ref 70–110)
HCT VFR BLD AUTO: 39.8 % (ref 40–54)
HGB BLD-MCNC: 13.8 G/DL (ref 14–18)
IMM GRANULOCYTES # BLD AUTO: 0.01 K/UL (ref 0–0.04)
IMM GRANULOCYTES NFR BLD AUTO: 0.1 % (ref 0–0.5)
LYMPHOCYTES # BLD AUTO: 2.7 K/UL (ref 1–4.8)
LYMPHOCYTES NFR BLD: 35.7 % (ref 18–48)
MCH RBC QN AUTO: 30.5 PG (ref 27–31)
MCHC RBC AUTO-ENTMCNC: 34.7 G/DL (ref 32–36)
MCV RBC AUTO: 88 FL (ref 82–98)
MONOCYTES # BLD AUTO: 0.8 K/UL (ref 0.3–1)
MONOCYTES NFR BLD: 10.4 % (ref 4–15)
NEUTROPHILS # BLD AUTO: 3.8 K/UL (ref 1.8–7.7)
NEUTROPHILS NFR BLD: 50.9 % (ref 38–73)
NRBC BLD-RTO: 0 /100 WBC
PLATELET # BLD AUTO: 294 K/UL (ref 150–450)
PMV BLD AUTO: 11.2 FL (ref 9.2–12.9)
POCT GLUCOSE: 136 MG/DL (ref 70–110)
POCT GLUCOSE: 156 MG/DL (ref 70–110)
POCT GLUCOSE: 171 MG/DL (ref 70–110)
POCT GLUCOSE: 223 MG/DL (ref 70–110)
POCT GLUCOSE: 231 MG/DL (ref 70–110)
POCT GLUCOSE: 93 MG/DL (ref 70–110)
POTASSIUM SERPL-SCNC: 4.2 MMOL/L (ref 3.5–5.1)
RBC # BLD AUTO: 4.53 M/UL (ref 4.6–6.2)
SODIUM SERPL-SCNC: 136 MMOL/L (ref 136–145)
WBC # BLD AUTO: 7.5 K/UL (ref 3.9–12.7)

## 2022-05-27 PROCEDURE — 99239 HOSP IP/OBS DSCHRG MGMT >30: CPT | Mod: NTX,,, | Performed by: INTERNAL MEDICINE

## 2022-05-27 PROCEDURE — 63600175 PHARM REV CODE 636 W HCPCS: Mod: NTX | Performed by: HOSPITALIST

## 2022-05-27 PROCEDURE — 80048 BASIC METABOLIC PNL TOTAL CA: CPT | Mod: NTX | Performed by: HOSPITALIST

## 2022-05-27 PROCEDURE — 63600175 PHARM REV CODE 636 W HCPCS: Mod: NTX | Performed by: STUDENT IN AN ORGANIZED HEALTH CARE EDUCATION/TRAINING PROGRAM

## 2022-05-27 PROCEDURE — 99232 SBSQ HOSP IP/OBS MODERATE 35: CPT | Mod: NTX,,, | Performed by: INTERNAL MEDICINE

## 2022-05-27 PROCEDURE — 99239 PR HOSPITAL DISCHARGE DAY,>30 MIN: ICD-10-PCS | Mod: NTX,,, | Performed by: INTERNAL MEDICINE

## 2022-05-27 PROCEDURE — 99232 PR SUBSEQUENT HOSPITAL CARE,LEVL II: ICD-10-PCS | Mod: NTX,,, | Performed by: INTERNAL MEDICINE

## 2022-05-27 PROCEDURE — 85025 COMPLETE CBC W/AUTO DIFF WBC: CPT | Mod: NTX | Performed by: INTERNAL MEDICINE

## 2022-05-27 PROCEDURE — 36415 COLL VENOUS BLD VENIPUNCTURE: CPT | Mod: NTX | Performed by: INTERNAL MEDICINE

## 2022-05-27 RX ORDER — DEXTROSE 4 G
TABLET,CHEWABLE ORAL
Qty: 1 EACH | Refills: 0 | Status: SHIPPED | OUTPATIENT
Start: 2022-05-27

## 2022-05-27 RX ORDER — INSULIN ASPART 100 [IU]/ML
12 INJECTION, SOLUTION INTRAVENOUS; SUBCUTANEOUS 3 TIMES DAILY
Qty: 30 ML | Refills: 3 | Status: ON HOLD | OUTPATIENT
Start: 2022-05-27 | End: 2022-07-27 | Stop reason: SDUPTHER

## 2022-05-27 RX ORDER — ESCITALOPRAM OXALATE 5 MG/1
5 TABLET ORAL DAILY
Qty: 90 TABLET | Refills: 3 | Status: SHIPPED | OUTPATIENT
Start: 2022-05-27 | End: 2022-07-27

## 2022-05-27 RX ORDER — PANTOPRAZOLE SODIUM 40 MG/1
40 TABLET, DELAYED RELEASE ORAL DAILY
Qty: 30 TABLET | Refills: 11 | Status: SHIPPED | OUTPATIENT
Start: 2022-05-27 | End: 2022-07-27

## 2022-05-27 RX ORDER — PEN NEEDLE, DIABETIC 30 GX3/16"
NEEDLE, DISPOSABLE MISCELLANEOUS
Qty: 200 EACH | Refills: 3 | Status: SHIPPED | OUTPATIENT
Start: 2022-05-27 | End: 2022-05-27 | Stop reason: HOSPADM

## 2022-05-27 RX ORDER — LANCETS 33 GAUGE
EACH MISCELLANEOUS 4 TIMES DAILY
Qty: 200 EACH | Refills: 3 | Status: ON HOLD | OUTPATIENT
Start: 2022-05-27 | End: 2023-11-15 | Stop reason: SDUPTHER

## 2022-05-27 RX ORDER — PEN NEEDLE, DIABETIC 29 G X1/2"
1 NEEDLE, DISPOSABLE MISCELLANEOUS 4 TIMES DAILY
Qty: 180 EACH | Refills: 3 | Status: ON HOLD | OUTPATIENT
Start: 2022-05-27 | End: 2023-04-06 | Stop reason: HOSPADM

## 2022-05-27 RX ORDER — POTASSIUM CHLORIDE 20 MEQ/1
40 TABLET, EXTENDED RELEASE ORAL DAILY
Qty: 60 TABLET | Refills: 11 | Status: ON HOLD | OUTPATIENT
Start: 2022-05-27 | End: 2022-07-27 | Stop reason: HOSPADM

## 2022-05-27 RX ADMIN — INSULIN ASPART 12 UNITS: 100 INJECTION, SOLUTION INTRAVENOUS; SUBCUTANEOUS at 09:05

## 2022-05-27 RX ADMIN — MILRINONE LACTATE IN DEXTROSE 0.38 MCG/KG/MIN: 200 INJECTION, SOLUTION INTRAVENOUS at 02:05

## 2022-05-27 RX ADMIN — INSULIN ASPART 4 UNITS: 100 INJECTION, SOLUTION INTRAVENOUS; SUBCUTANEOUS at 02:05

## 2022-05-27 RX ADMIN — INSULIN ASPART 12 UNITS: 100 INJECTION, SOLUTION INTRAVENOUS; SUBCUTANEOUS at 01:05

## 2022-05-27 RX ADMIN — INSULIN ASPART 2 UNITS: 100 INJECTION, SOLUTION INTRAVENOUS; SUBCUTANEOUS at 05:05

## 2022-05-27 RX ADMIN — INSULIN ASPART 12 UNITS: 100 INJECTION, SOLUTION INTRAVENOUS; SUBCUTANEOUS at 05:05

## 2022-05-27 RX ADMIN — MILRINONE LACTATE IN DEXTROSE 0.38 MCG/KG/MIN: 200 INJECTION, SOLUTION INTRAVENOUS at 05:05

## 2022-05-27 RX ADMIN — INSULIN ASPART 2 UNITS: 100 INJECTION, SOLUTION INTRAVENOUS; SUBCUTANEOUS at 09:05

## 2022-05-27 NOTE — ASSESSMENT & PLAN NOTE
Consulted for:   [facility]  DM type:  T2D diagnosed 2021 w/o h/o med use  A1C: 9.2  Hypoglycemia:  none  Steroids:   none  Diet/Tfs:    100%  Glucose Goals:  140-180 mg/dL    Glucose Trend x 24hr:    Improving today 136-223    Home Regimen:    None at this time, was prescribed lantus 10 units daily 5/20  Total daily dose weight based approximately 46 units    Off  gtt on milrinone gtt  Required correction o/n    PLAN:  -  Grady still pending , if positive switch to Bid detemir  -  Increase Detemir  23  units daily  -  Increase Aspart   12  units TIDWM, increase today pending po intake  -  Mod dose correction insulin  -  Accucheck achs2

## 2022-05-27 NOTE — HOSPITAL COURSE
Patient was admitted for Hyperglycemic Hyperosmolar syndrome. Patient stated that he was not using insulin as he didn't get the needle supplies .  He has Randolph on presentation. His milrinone was changed to Dobutamine. Lasix , entresto and Aldlactone was held. Endocrine was consulted and his insulin was adjusted. Eventually his dobutamine was changed to milrinone after creatinine came to baseline. Also entresto was discontinued as systolic blood pressures have been between 90 to 100. He will follow up with clinic on June 8 . Will order a BMP for outpatient with in a week

## 2022-05-27 NOTE — PLAN OF CARE
Pt PICC purple lumen was flushed with NS and now has blood return due to previous administration of cath ej. Pt is AAOx4. POC reviewed at bedside with pt and spouse. VS stable, afebrile throughout shift.  Pt denies any chest pain or palpitation, no s/s of respiratory distress.  Pt has remained free from injury during this shift. Pt bed in low locked position. Independent in room. Call light and personal belongings within reach. Side rails up x2.  Pt was advice to call nurse with any questions or concerns. Pt is on Milrinone Drip. Will continue to monitor.

## 2022-05-27 NOTE — PROGRESS NOTES
DIscharge    Pt presents in room with spouse. Pt voices agreement with plan to discharge to home today with Biosc[employer]. Pt goes to their [facility] for dressing change and labs. Pt's spouse will transport. Pt reports coping well and denies further needs, questions, concerns at this time and none indicated. Providing psychosocial and counseling support, education, resources, assistance and discharge planning as indicated. SW remains available.

## 2022-05-27 NOTE — NURSING
CATH HARRISON was administered as PER MAR orders. Purple lumen was closed and taped over with blue coband. RN for night shift notified to continue care for pt. Pt was switched to lab collect and lab was notified of any pending collections.

## 2022-05-27 NOTE — PLAN OF CARE
Plan of care discussed with patient. Patient is free of fall/trauma/injury. Denies CP, SOB, or pain/discomfort. Vitals WNL, pt awake alert and oriented X 4. Ambulated in room and hallways today. Milrinone gtt maintained per orders. All questions addressed. Will continue to monitor

## 2022-05-27 NOTE — SUBJECTIVE & OBJECTIVE
"Interval HPI:   Glucose trend 136-223  Required correction overnight suspect inadequate basal which may require increased dose or b.i.d. dosing    Overnight events: none  Eatin%  Nausea: No  Hypoglycemia and intervention: No  Fever: No  TPN and/or TF: No  If yes, type of TF/TPN and rate: na    BP (!) 96/51 (Patient Position: Lying)   Pulse 95   Temp 97.9 °F (36.6 °C)   Resp 15   Ht 6' 3" (1.905 m)   Wt 93.7 kg (206 lb 9.1 oz)   SpO2 97%   BMI 25.82 kg/m²     Labs Reviewed and Include    Recent Labs   Lab 22  0915   *   CALCIUM 10.0      K 4.2   CO2 24      BUN 26*   CREATININE 1.5*     Lab Results   Component Value Date    WBC 7.50 2022    HGB 13.8 (L) 2022    HCT 39.8 (L) 2022    MCV 88 2022     2022     No results for input(s): TSH, FREET4 in the last 168 hours.  Lab Results   Component Value Date    HGBA1C 9.2 (H) 2022       Nutritional status:   Body mass index is 25.82 kg/m².  Lab Results   Component Value Date    ALBUMIN 3.9 2022    ALBUMIN 4.2 2022    ALBUMIN 4.4 2022     Lab Results   Component Value Date    PREALBUMIN 36 2022       Estimated Creatinine Clearance: 80.6 mL/min (A) (based on SCr of 1.5 mg/dL (H)).    Accu-Checks  Recent Labs     22  0745 22  1139 22  1517 22  1713 22  2001 22  1139 22  2121 22  0011 22  0206 22  0908   POCTGLUCOSE 276* 310* 276* 272* 387* 319* 357* 136* 223* 156*       Current Medications and/or Treatments Impacting Glycemic Control  Immunotherapy:    Immunosuppressants       None          Steroids:   Hormones (From admission, onward)                None          Pressors:    Autonomic Drugs (From admission, onward)                None          Hyperglycemia/Diabetes Medications:   Antihyperglycemics (From admission, onward)                Start     Stop Route Frequency Ordered    22 0900  insulin detemir " U-100 pen 22 Units         -- SubQ Daily 05/27/22 1039    05/27/22 0715  insulin aspart U-100 pen 12 Units         -- SubQ 3 times daily with meals 05/26/22 2019    05/25/22 1111  insulin aspart U-100 pen 1-10 Units         -- SubQ Before meals & nightly PRN 05/25/22 1013

## 2022-05-27 NOTE — DISCHARGE SUMMARY
[facility] - Cardiology Stepdown  Heart Transplant  Discharge Summary      Patient Name: Kevan Queen  MRN: [id number]  Admission Date: 5/24/2022  Hospital Length of Stay: 3 days  Discharge Date and Time: 05/27/2022 4:50 PM  Attending Physician: Luca Lopez Jr.,*   Discharging Provider: Joaquín Guajardo MD  Primary Care Provider: ORALIA Cline     HPI: 37 y.o. athletic  male with a past medical history remarkable for recently diagnosed NICM admitted with ADHF/cardiogenic shock on home milrinone. He presented with symptoms of feeling weak and fatigued. He is not measuring his blood glucose at home (doesn't have glucometer at home). His vitals was low normal in his blood pressure. Of note, he presented to [place] where received IV diuretics. He denies chest pain, dyspnea on exertion or loss of consciousness. He is establish care with Endocrinology for better diabetes control given HbA1c of 9.2% 5/20/2022. His HF medication are Entresto 49-51 BID, Aldactone 25 daily, and digoxin 0.125 mg daily, milrinone at 0.375 mcg/kg/min. Per note, he was approved for VAD (not Soren due to smoking until admission) 4/20/2022. He has issues with shoulder being frozen at the location of ICD placement.       * No surgery found *     Hospital Course: Patient was admitted for Hyperglycemic Hyperosmolar syndrome. Patient stated that he was not using insulin as he didn't get the needle supplies .  He has Randolph on presentation. His milrinone was changed to Dobutamine. Lasix , entresto and Aldlactone was held. Endocrine was consulted and his insulin was adjusted. Eventually his dobutamine was changed to milrinone after creatinine came to baseline. Also entresto was discontinued as systolic blood pressures have been between 90 to 100. He will follow up with clinic on June 8 . Will order a BMP for outpatient with in a week       Goals of Care Treatment Preferences:  Code Status: Full Code      Consults (From admission,  onward)        Status Ordering Provider     Inpatient consult to Registered Dietitian/Nutritionist  Once        Provider:  (Not yet assigned)    Completed REZA LOBO     Inpatient consult to Endocrinology  Once        Provider:  (Not yet assigned)    Completed SIVA MACKAY     Inpatient consult to Heart Transplant  Once        Provider:  (Not yet assigned)    Completed BRAIN FERRER          Significant Diagnostic Studies: Labs:   CMP   Recent Labs   Lab 05/26/22  0823 05/27/22  0915   * 136   K 4.3 4.2   CL 97 101   CO2 26 24   * 191*   BUN 30* 26*   CREATININE 1.9* 1.5*   CALCIUM 10.4 10.0   PROT 8.0  --    ALBUMIN 3.9  --    BILITOT 0.5  --    ALKPHOS 92  --    AST 31  --    ALT 28  --    ANIONGAP 11 11   ESTGFRAFRICA 50.9* >60.0   EGFRNONAA 44.1* 58.6*    and CBC   Recent Labs   Lab 05/26/22  0246 05/27/22  0522   WBC 7.30 7.50   HGB 13.9* 13.8*   HCT 39.8* 39.8*    294       Pending Diagnostic Studies:     Procedure Component Value Units Date/Time    Glutamic acid decarboxylase [[id number]] Collected: 05/25/22 1138    Order Status: Sent Lab Status: In process Updated: 05/25/22 1142    Specimen: Blood     Nicotine And Metabolites, Blood [[id number]] Collected: 05/26/22 1859    Order Status: Sent Lab Status: In process Updated: 05/26/22 1914    Specimen: Blood     Nicotine And Metabolites, Blood [[id number]] Collected: 05/26/22 1859    Order Status: Sent Lab Status: In process Updated: 05/26/22 1859    Specimen: Blood         Final Active Diagnoses:    Diagnosis Date Noted POA    PRINCIPAL PROBLEM:  Hyperosmolar hyperglycemic state (HHS) [E11.00, E11.65] 05/25/2022 Yes    Uncontrolled diabetes mellitus [E11.65] 05/25/2022 Yes    CKD (chronic kidney disease) stage 3, GFR 30-59 ml/min [N18.30] 04/29/2022 Yes    Chronic combined systolic and diastolic congestive heart failure [I50.42] 11/05/2020 Yes      Problems Resolved During this Admission:      Discharged Condition:  "good    Disposition: Home or Self Care    Follow Up:    Patient Instructions:      DIABETIC SUPPLIES FOR HOME USE     Order Specific Question Answer Comments   Height: 6' 3" (1.905 m)    Weight: 93.7 kg (206 lb 9.1 oz)    Does patient have medical equipment at home? none    Length of need (1-99 months): 3    Is the Patient insulin dependent? No    How many times each day does your Patient test his or her glucose level? BID    Do you follow the Patient at least every 6 months for Management of Diabetes? Yes    Home blood glucose monitor? Yes    Lancing device? Yes    Alcohol wipes? Yes      Basic metabolic panel   Standing Status: Future Standing Exp. Date: 07/26/23     Diet diabetic     Activity as tolerated     Medications:  Reconciled Home Medications:      Medication List      START taking these medications    insulin aspart U-100 100 unit/mL (3 mL) Inpn pen  Commonly known as: NovoLOG  Inject 12 Units into the skin 3 (three) times daily.     LEVEMIR FLEXTOUCH U-100 INSULN 100 unit/mL (3 mL) Inpn pen  Generic drug: insulin detemir U-100  Inject 23 Units into the skin once daily.  Start taking on: May 28, 2022     pen needle, diabetic 31 gauge x 1/4" Ndle  1 Device by Misc.(Non-Drug; Combo Route) route 4 (four) times daily.  Replaces: BD GARTH 2ND GEN PEN NEEDLE 32 gauge x 5/32" Ndle     potassium chloride SA 20 MEQ tablet  Commonly known as: K-DUR,KLOR-CON  Take 2 tablets (40 mEq total) by mouth once daily.     TRUE METRIX GLUCOSE METER Misc  Generic drug: blood-glucose meter  Use as instructed     TRUE METRIX GLUCOSE TEST STRIP Strp  Generic drug: blood sugar diagnostic  Use to test blood glucose 4 (four) times daily.     TRUEPLUS LANCETS 33 gauge Misc  Generic drug: lancets  Use to test blood glucose 4 (four) times daily.        CONTINUE taking these medications    albuterol 90 mcg/actuation inhaler  Commonly known as: PROVENTIL/VENTOLIN HFA  INHALE 2 PUFFS BY MOUTH EVERY 4 HOURS AS NEEDED FOR COUGH OR " "WHEEZING     aspirin 81 MG Chew  Take 81 mg by mouth once daily.     busPIRone 7.5 MG tablet  Commonly known as: BUSPAR  Take 7.5 mg by mouth every 12 (twelve) hours.     EScitalopram oxalate 5 MG Tab  Commonly known as: LEXAPRO  Take 1 tablet (5 mg total) by mouth once daily.     furosemide 80 MG tablet  Commonly known as: LASIX  Take 80 mg by mouth 2 (two) times daily.     milrinone 20mg/100ml D5W (200mcg/ml) 20 mg/100 mL (200 mcg/mL) infusion  Commonly known as: PRIMACOR  Inject 34.875 mcg/min into the vein continuous.     mirtazapine 15 MG tablet  Commonly known as: REMERON  Take 15 mg by mouth nightly.     pantoprazole 40 MG tablet  Commonly known as: PROTONIX  Take 1 tablet (40 mg total) by mouth once daily.     promethazine 12.5 MG Tab  Commonly known as: PHENERGAN  Take 12.5 mg by mouth every 6 (six) hours as needed.     sucralfate 1 gram tablet  Commonly known as: CARAFATE  Take 1 g by mouth nightly.        STOP taking these medications    BD GARTH 2ND GEN PEN NEEDLE 32 gauge x 5/32" Ndle  Generic drug: pen needle, diabetic  Replaced by: pen needle, diabetic 31 gauge x 1/4" Ndle     digoxin 125 mcg tablet  Commonly known as: LANOXIN     ENTRESTO 49-51 mg per tablet  Generic drug: sacubitriL-valsartan     ferrous sulfate 325 (65 FE) MG EC tablet     LANTUS SOLOSTAR U-100 INSULIN glargine 100 units/mL (3mL) SubQ pen  Generic drug: insulin     metOLazone 2.5 MG tablet  Commonly known as: ZAROXOLYN     metoprolol succinate 25 MG 24 hr tablet  Commonly known as: TOPROL-XL     prochlorperazine 5 MG tablet  Commonly known as: COMPAZINE     spironolactone 25 MG tablet  Commonly known as: ALDACTONE     traMADoL 50 mg tablet  Commonly known as: FRANCHESCA Guajardo MD  Heart Transplant  [facility] - Cardiology Stepdown  "

## 2022-05-27 NOTE — PROGRESS NOTES
"[facility] - Cardiology Stepdown  Endocrinology  Progress Note    Admit Date: 2022     Reason for Consult: Management of T2DM, Hyperglycemia         Diabetes diagnosis year: 21 HbA1c 6.8; repeat A1c on  was 9.8    Home Diabetes Medications:  Prescribed lantus 10 units on  - not taking as patient did not have needles to administer insulin at home    How often checking glucose at home?  Patient has not been checking blood sugars at home as he did not have a glucose meter     BG readings on regimen: NA/unknown  Hypoglycemia on the regimen?  No  Missed doses on regimen?  No    Diabetes Complications include:     Hyperglycemia    Complicating diabetes co morbidities:   History of MI, CHF, and CKD      HPI:   Patient is a 37 y.o. male with a diagnosis of NICM, diastolic CHF (EF 15%), s/p ICD, CKD stage III admitted for acute decompensated CHF and found to be in HHS on admission. On admission, blood sugar elevated to 853 with no ketoacidosis, anion gap noted on labs. Patient noted significant polydipsia and polyuria prior to admission. Patient was started on insulin gtt with IV fluids. Patient being treated for CHF by CICU and currently on dobutamine gtt. Endocrinology consulted regarding HHS and new onset diabetes management.         Interval HPI:   Glucose trend 136-223  Required correction overnight suspect inadequate basal which may require increased dose or b.i.d. dosing    Overnight events: none  Eatin%  Nausea: No  Hypoglycemia and intervention: No  Fever: No  TPN and/or TF: No  If yes, type of TF/TPN and rate: na    BP (!) 96/51 (Patient Position: Lying)   Pulse 95   Temp 97.9 °F (36.6 °C)   Resp 15   Ht 6' 3" (1.905 m)   Wt 93.7 kg (206 lb 9.1 oz)   SpO2 97%   BMI 25.82 kg/m²     Labs Reviewed and Include    Recent Labs   Lab 22  0915   *   CALCIUM 10.0      K 4.2   CO2 24      BUN 26*   CREATININE 1.5*     Lab Results   Component Value Date    WBC 7.50 " 05/27/2022    HGB 13.8 (L) 05/27/2022    HCT 39.8 (L) 05/27/2022    MCV 88 05/27/2022     05/27/2022     No results for input(s): TSH, FREET4 in the last 168 hours.  Lab Results   Component Value Date    HGBA1C 9.2 (H) 05/20/2022       Nutritional status:   Body mass index is 25.82 kg/m².  Lab Results   Component Value Date    ALBUMIN 3.9 05/26/2022    ALBUMIN 4.2 05/24/2022    ALBUMIN 4.4 05/20/2022     Lab Results   Component Value Date    PREALBUMIN 36 04/18/2022       Estimated Creatinine Clearance: 80.6 mL/min (A) (based on SCr of 1.5 mg/dL (H)).    Accu-Checks  Recent Labs     05/25/22  0745 05/25/22  1139 05/25/22  1517 05/25/22  1713 05/25/22 2001 05/26/22  1139 05/26/22  2121 05/27/22  0011 05/27/22  0206 05/27/22  0908   POCTGLUCOSE 276* 310* 276* 272* 387* 319* 357* 136* 223* 156*       Current Medications and/or Treatments Impacting Glycemic Control  Immunotherapy:    Immunosuppressants       None          Steroids:   Hormones (From admission, onward)                None          Pressors:    Autonomic Drugs (From admission, onward)                None          Hyperglycemia/Diabetes Medications:   Antihyperglycemics (From admission, onward)                Start     Stop Route Frequency Ordered    05/28/22 0900  insulin detemir U-100 pen 22 Units         -- SubQ Daily 05/27/22 1039    05/27/22 0715  insulin aspart U-100 pen 12 Units         -- SubQ 3 times daily with meals 05/26/22 2019    05/25/22 1111  insulin aspart U-100 pen 1-10 Units         -- SubQ Before meals & nightly PRN 05/25/22 1013            ASSESSMENT and PLAN    Uncontrolled diabetes mellitus  Consulted for:   [facility]  DM type:  T2D diagnosed 4/2021 w/o h/o med use  A1C: 9.2  Hypoglycemia:  none  Steroids:   none  Diet/Tfs:    100%  Glucose Goals:  140-180 mg/dL    Glucose Trend x 24hr:    Improving today 136-223    Home Regimen:    None at this time, was prescribed lantus 10 units daily 5/20  Total daily dose weight based  approximately 46 units    Off  gtt on milrinone gtt  Required correction o/n    PLAN:  -  Grady still pending , if positive switch to Bid detemir  -  Increase Detemir  23  units daily  -  Increase Aspart   12  units TIDWM, increase today pending po intake  -  Mod dose correction insulin  -  Accucheck achs2      Hyperosmolar hyperglycemic state (HHS)  Resolving  Patient was started on insulin gtt on admission, transitioned to subcutaneous insulin .    See Diabetes management      CKD (chronic kidney disease) stage 3, GFR 30-59 ml/min  Caution with aggressive insulin adjustments to reduce risk of hypoglycemia  Titrate insulin slowly to avoid hypoglycemia as the risk of hypoglycemia increases with decreased creatinine clearance.      Chronic combined systolic and diastolic congestive heart failure  Mr. Queen is a 37 year old man with history of NICM admitted with ADHF/cardiogenic shock on home milrinone. He presented with symptoms of feeling weak and fatigued. Most recent echo shows EF 15% with reduced systolic and diastolic dysfunction. Patient started on dobutamine drip per CICU.     -manage per primary team (CICU)        Thong Griffith MD  Endocrinology  [facility] - Cardiology Stepdown

## 2022-05-28 NOTE — NURSING
Pt is AAOx4. VS stable.  Pt denies any chest pain or palpitation, no s/s of respiratory distress.Pt was hookup to home pump with Milirone drip infusing, tolerated well. Discharge papers given and reviewed. Left with belongings.

## 2022-05-30 ENCOUNTER — PATIENT OUTREACH (OUTPATIENT)
Dept: ADMINISTRATIVE | Facility: CLINIC | Age: 37
End: 2022-05-30
Payer: MEDICAID

## 2022-05-30 PROBLEM — G47.00 INSOMNIA: Status: ACTIVE | Noted: 2022-05-30

## 2022-05-30 LAB
COTININE SERPL-MCNC: <3 NG/ML
GAD65 AB SER-SCNC: 0 NMOL/L
NICOTINE SERPL-MCNC: <3 NG/ML

## 2022-05-30 NOTE — PROGRESS NOTES
"Patient Name: Kevan Queen   : 1985  MRN: [id number]     SUBJECTIVE:  Kevan Queen is a 37 y.o. male     22:  Pt here today after inpt stay in [place] for heart failure, was referred for a heart transplant.  Pt was dx with DM while inpatient and is in need of Diabetic Education.  Patient is noncompliant with clinic visits. Referred for heart transplant 22.  Appt 22 for transplant.        "He was been followed in clinic and underwent RHC 2022 with biventricular elevated filling pressures and echo showing EF 10%, LVEDD 70 mm, severe RVE and RVD with TAPSE 1.4, severe MR and subsequently admitted and discharged 2022 after aggressive diuresis. Advanced pathway was started"     Last glucose  was 191.  Ferritin normal at 150.   Pending referrals to [place], Ortho, and Endocrine. Last labs showed hgb 13.8/hct 39.8. BUN 26/creat 1.5 on 22 labs. Glucose 171 on labs  Pt is c/o a pain in his throat deep in neck.  No redness or coughing or signs of strep noted.  Got tested for COVID prior to D/C from [place]. Denies any procedure being done there.  Wanted to take lozenges as wife just got over cold/sore throat but did not know if he could take them.   Pt is currently on Novolog 12 units TID, Detemir 23 units daily. He gets montly labs ordered.  QUINTIN 65 is pending.  Told he may have type 1 dm. Pending referral to Endocrine.  Left Shoulder pain secondary to device in hospital, pending referral to Ortho.  Anemia, stable on last draw. Iron stores were 243 in April this year.  Anxiety: pt has lots of anxiety related to health condition.  He was started on Lexapro but has not followed up with psych.  Remeron is helping for sleep at night.  He notes that he has issues and is requesting to be referred to someone to talk to, psychiatrist specifically.  He does not think Lexapro is helping anxiety and states Buspirone does not work either.      10/15/21: Pt presents to clinic today after not being " seen since April. Multiple no shows. Last note I had tried to contact pt to inform him of UTI and increased PSA but he never followed-up with us since. Wife states he did take abx for UTI. He is here today with complaints of continued abdominal pain and not able to sleep after numerous hospital admissions for n/v, abdominal pain, CP, SOB. Wears life vest. Most recent Echo showing EF 10-15%. Palliative care was consulted during last admission. Continued drug abuse noted. Was on transplant list but is supposed to go to drug rehab first, therefore is not a candidate for heart transplant. He is seeing renal with next appt being in November. He has CKD. Is seeing new cardiologist in November-Dr. Dempsey.  c/o ongoing Insomnia-states is fully able to sleep during the day but is not able to sleep at night. He will stay awake all night with inability to fall asleep. Was given medications while in hospital including Remeron which per the palliative care dr helped him somewhat.   c/o nausea-ongoing nausea with abdominal pain. scope in September showed chronic gastritis and cholecystitis from an US in which he is not a candidate for surgical intervention. pt states he does not know why he can't sleep but he can't and all he wants to do is go to sleep right now.  previous EKG in 10/21 showed prolonged /QTc 502    4/12/2021: Here today for follow-up.  -Cardiology stopped pt Vistaril due to oversedation and fatigue c/o. Switched Bumex to Torsemide due to itching. States since starting Torsemide he has burning with ejaculation. He stopped taking it and restarted the Bumex he was on and the Vistaril that was prescribed for Insomnia/Itching.  -Intermittent Testicular pain on left testicle-denies pain on urination, denies abdominal pain or inguinal pain. No groin swelling reported. +painful with sex. denies discharge. pain is more localized under scrotum. reports itching inside penis, testicles which all started when began taking  Torsemide. Denies trouble urinating.    2021: Complaints today: Here to establish care with new PCP. Followed by Cardiology. Hospitalized and d/c before Thanksgiving. +life vest. Requesting Cardiology referral.  -NICMO-Walking about 200 feet before getting SOB. Life vest present. has noticed improvement in his condition. States he was addicted to Ecstacy and was using it a lot. Wife states they would get 100 at a time and in no time she would go through 50 of them and so would he. He overdosed on this, which is what led to his hospitalization. His father also  from HF. He tells me that he is going to get into rehab for his addiction, he is not currently using but he needs to do this in order to get on a list for a heart transplant.  -Insomnia-states is fully able to sleep during the day but is not able to sleep at night. He will stay awake all night with inability to fall asleep. Was given medications while in hospital including Xanax and Ativan and nothing helped.   -Rash to abdomen has been there for over 1 week. Was previously given antifungal creams to treat but it has returned to abdomen. He also has broken out with numerous bumps to entire back and forehead that he noticed once he filled his Bumetadine pills. He states that he had previously filled in [place] and was taking a white pill that was not causing skin eruptions but when he started taking this pink 2mg pill, he noticed the bumps appearing. Mild itching reported.   -Epigastric pain-takes Protonix only when he needs it.   -Testicular pain on left testicle-denies pain on urination, denies abdominal pain or inguinal pain. No groin swelling reported. painful with sex. denies discharge. pain is more localized under scrotum.  Cancer Screening:  Colonoscopy: never done  Vaccines:  Tetanus/Tdap: UTD  Flu: 2020  Pna: 2020      The patient's allergies, medications, history, and problem list were updated as appropriate.    REVIEW OF SYSTEMS:  A  "comprehensive review of symptoms was completed and negative except as noted in the HPI.    OBJECTIVE:  Vital signs  Vitals:    05/31/22 0905   BP: 112/80   BP Location: Left arm   Patient Position: Sitting   BP Method: Medium (Automatic)   Pulse: 109   Resp: 20   Temp: 98.3 °F (36.8 °C)   SpO2: 100%   Weight: 98.5 kg (217 lb 1.6 oz)   Height: 6' 3" (1.905 m)        Physical Exam  Vitals and nursing note reviewed.   HENT:      Head: Normocephalic.      Right Ear: Tympanic membrane normal.      Left Ear: Tympanic membrane normal.      Nose: Nose normal.      Mouth/Throat:      Mouth: Mucous membranes are moist.      Pharynx: Oropharynx is clear.   Eyes:      Extraocular Movements: Extraocular movements intact.      Conjunctiva/sclera: Conjunctivae normal.      Pupils: Pupils are equal, round, and reactive to light.   Cardiovascular:      Rate and Rhythm: Normal rate and regular rhythm.      Pulses: Normal pulses.      Heart sounds: Normal heart sounds.   Pulmonary:      Effort: Pulmonary effort is normal.      Breath sounds: Normal breath sounds.   Abdominal:      General: Abdomen is flat. Bowel sounds are normal.      Palpations: Abdomen is soft.   Musculoskeletal:         General: Normal range of motion.      Cervical back: Normal range of motion and neck supple.      Right foot: Normal range of motion. No deformity or bunion.      Left foot: Normal range of motion. No deformity or bunion.   Feet:      Right foot:      Protective Sensation: 10 sites tested. 10 sites sensed.      Skin integrity: Skin integrity normal. No ulcer, blister or skin breakdown.      Toenail Condition: Right toenails are normal.      Left foot:      Protective Sensation: 10 sites tested. 10 sites sensed.      Skin integrity: Skin integrity normal. No ulcer, blister or skin breakdown.      Toenail Condition: Left toenails are normal.   Neurological:      General: No focal deficit present.      Mental Status: He is alert and oriented to " person, place, and time. Mental status is at baseline.   Psychiatric:         Mood and Affect: Mood normal.         Behavior: Behavior normal.         Thought Content: Thought content normal.         Judgment: Judgment normal.          ASSESSMENT/PLAN:  1. Ecstasy abuse  Overview:  Reports at visit 11/05/2020 no longer using      2. Insomnia, unspecified type  Assessment & Plan:  Continue Remeron  Avoid caffeine, alcohol and stimulants. Do not use illicit drugs.  Practice positive phrases and repeat throughout the day.  Try yoga, lavender scents or Chamomile tea to promote relaxation.  Set healthy boundaries, avoid people and conversations that increase stress.  Avoid caffeinated beverages after lunch  Avoid alcohol near bedtime (eg, late afternoon and evening)  Avoid smoking or other nicotine intake, particularly during the evening  Exercise regularly for at least 20 minutes, preferably more than four to five hours prior to bedtime  Avoid daytime naps, especially if they are longer than 20 to 30 minutes or occur late in the day  Resolve concerns or worries before bedtime  Try not to force sleep    Sleep Hygiene Techniques: Sleep hygiene refers to actions that tend to improve and maintain good sleep  Power down electronic devices at least one hour prior to bedtime.  Keep room dark; use eye mask or relaxation sound machine to promote rest.  Sleep as long as necessary to feel rested (usually seven to eight hours for adults) and then get out of bed  Maintain a regular sleep schedule, particularly a regular wake-up time in the morning        3. Cannabis use disorder, severe, dependence    4. Anxiety disorder, unspecified type  Assessment & Plan:  Continue Lexapro 5mg po daily  Practice deep breathing or abdominal breathing exercises when anxiety occurs.  Exercise daily. Get sunlight daily.  Avoid caffeine, alcohol and stimulants.  Practice positive phrases and repeat throughout the day, yoga, lavender scents or  Chamomile tea will help anxiety.  Set healthy boundaries, avoid people and conversations that increase stress.  Reports any symptoms of suicidal or homicidal ideations immediately, if clinic is closed go to nearest emergency room.        Orders:  -     Ambulatory referral/consult to Psychiatry    5. Stage 3a chronic kidney disease  Overview:  CMP  Sodium   Date Value Ref Range Status   05/27/2022 136 136 - 145 mmol/L Final     Potassium   Date Value Ref Range Status   05/27/2022 4.2 3.5 - 5.1 mmol/L Final     Chloride   Date Value Ref Range Status   05/27/2022 101 95 - 110 mmol/L Final     CO2   Date Value Ref Range Status   05/27/2022 24 23 - 29 mmol/L Final     Glucose   Date Value Ref Range Status   05/27/2022 191 (H) 70 - 110 mg/dL Final     BUN   Date Value Ref Range Status   05/27/2022 26 (H) 6 - 20 mg/dL Final     Creatinine   Date Value Ref Range Status   05/27/2022 1.5 (H) 0.5 - 1.4 mg/dL Final     Calcium   Date Value Ref Range Status   05/27/2022 10.0 8.7 - 10.5 mg/dL Final     Total Protein   Date Value Ref Range Status   05/26/2022 8.0 6.0 - 8.4 g/dL Final     Albumin   Date Value Ref Range Status   05/26/2022 3.9 3.5 - 5.2 g/dL Final     Total Bilirubin   Date Value Ref Range Status   05/26/2022 0.5 0.1 - 1.0 mg/dL Final     Comment:     For infants and newborns, interpretation of results should be based  on gestational age, weight and in agreement with clinical  observations.    Premature Infant recommended reference ranges:  Up to 24 hours.............<8.0 mg/dL  Up to 48 hours............<12.0 mg/dL  3-5 days..................<15.0 mg/dL  6-29 days.................<15.0 mg/dL       Alkaline Phosphatase   Date Value Ref Range Status   05/26/2022 92 55 - 135 U/L Final     AST   Date Value Ref Range Status   05/26/2022 31 10 - 40 U/L Final     ALT   Date Value Ref Range Status   05/26/2022 28 10 - 44 U/L Final     Anion Gap   Date Value Ref Range Status   05/27/2022 11 8 - 16 mmol/L Final     eGFR if     Date Value Ref Range Status   05/27/2022 >60.0 >60 mL/min/1.73 m^2 Final     eGFR if non    Date Value Ref Range Status   05/27/2022 58.6 (A) >60 mL/min/1.73 m^2 Final     Comment:     Calculation used to obtain the estimated glomerular filtration  rate (eGFR) is the CKD-EPI equation.            Assessment & Plan:  Established with Nephrology, call and schedule a FU appt.  GFR 58.6  Follow renoprotective measures including Renal Diet (reduce intake of nuts, peanut butter, milk, cheese, dried beans, peas) and Low Sodium Diet (less than 2 grams per day).  Avoid NSAIDs (Aleve, Mobic, Celebrex, Ibuprofen, Advil, Toradol and Diclofenac). May take Tylenol as needed for headache/pain.  Control DM with goal A1C <7. BP goal <130/80. LDL goal < 100.  Stay well hydrated. Avoid alcohol and soda. Limit tea and coffee.  Smoking Cessation recommended.          6. Anemia of chronic disease  Overview:  Lab Results   Component Value Date    HGB 13.8 (L) 05/27/2022       Lab Results   Component Value Date    HCT 39.8 (L) 05/27/2022       Lab Results   Component Value Date    IRON 243 (H) 04/18/2022    TIBC 365 08/31/2021    FERRITIN 150 04/18/2022         Assessment & Plan:  Add iron rich foods to diet such as liver, lean beef, eggs, dried fruit, dark green leafy vegetables.        7. Tobacco use    8. Uncontrolled other specified diabetes mellitus with hyperglycemia  Overview:  Lab Results   Component Value Date    HGBA1C 9.2 (H) 05/20/2022         Assessment & Plan:  A1C, CMP, Fundus, Microalbumin today  Continue Insulin as ordered  Follow ADA Diet. Avoid soda, simple sweets, and limit rice/pasta/breads/starches.  Maintain healthy weight with goal BMI <30. Exercise 5 times per week for 30 minutes per day.  Stressed importance of daily foot exams.  Stressed importance of annual dilated eye exam.      Orders:  -     Microalbumin/Creatinine Ratio, Urine  -     Diabetic Eye Screening Photo    9.  Nausea  -     promethazine (PHENERGAN) 12.5 MG Tab    10. Chronic left shoulder pain  Assessment & Plan:  Pending referral to ortho  Ultram filled today, usually gets from [facility].  Encouraged him to FU with them and obtain further meds from them.     Orders:  -     traMADoL (ULTRAM) 50 mg tablet     No results found for this or any previous visit (from the past 24 hour(s)).      Follow Up:  No follow-ups on file.     This note was created with the assistance of a voice recognition software or phone dictation. There may be transcription errors as a result of using this technology however minimal. Effort has been made to assure accuracy of transcription but any obvious errors or omissions should be clarified with the author of the document

## 2022-05-30 NOTE — PROGRESS NOTES
C3 nurse spoke with Kevan Queen  for a TCC post hospital discharge follow up call. The patient has a scheduled [facility] appointment with ORALIA Cline  on 5/31/22 @ 3529.

## 2022-05-31 ENCOUNTER — OFFICE VISIT (OUTPATIENT)
Dept: FAMILY MEDICINE | Facility: CLINIC | Age: 37
End: 2022-05-31
Payer: MEDICAID

## 2022-05-31 VITALS
BODY MASS INDEX: 27 KG/M2 | OXYGEN SATURATION: 100 % | HEIGHT: 75 IN | HEART RATE: 109 BPM | SYSTOLIC BLOOD PRESSURE: 112 MMHG | DIASTOLIC BLOOD PRESSURE: 80 MMHG | RESPIRATION RATE: 20 BRPM | WEIGHT: 217.13 LBS | TEMPERATURE: 98 F

## 2022-05-31 DIAGNOSIS — D63.8 ANEMIA OF CHRONIC DISEASE: ICD-10-CM

## 2022-05-31 DIAGNOSIS — F16.10 ECSTASY ABUSE: Primary | ICD-10-CM

## 2022-05-31 DIAGNOSIS — M25.512 CHRONIC LEFT SHOULDER PAIN: ICD-10-CM

## 2022-05-31 DIAGNOSIS — R11.0 NAUSEA: ICD-10-CM

## 2022-05-31 DIAGNOSIS — E13.65 UNCONTROLLED OTHER SPECIFIED DIABETES MELLITUS WITH HYPERGLYCEMIA: ICD-10-CM

## 2022-05-31 DIAGNOSIS — F12.20 CANNABIS USE DISORDER, SEVERE, DEPENDENCE: Chronic | ICD-10-CM

## 2022-05-31 DIAGNOSIS — N18.31 STAGE 3A CHRONIC KIDNEY DISEASE: ICD-10-CM

## 2022-05-31 DIAGNOSIS — Z72.0 TOBACCO USE: ICD-10-CM

## 2022-05-31 DIAGNOSIS — G47.00 INSOMNIA, UNSPECIFIED TYPE: ICD-10-CM

## 2022-05-31 DIAGNOSIS — G89.29 CHRONIC LEFT SHOULDER PAIN: ICD-10-CM

## 2022-05-31 DIAGNOSIS — F41.9 ANXIETY DISORDER, UNSPECIFIED TYPE: ICD-10-CM

## 2022-05-31 PROBLEM — E11.00 HYPEROSMOLAR HYPERGLYCEMIC STATE (HHS): Status: RESOLVED | Noted: 2022-05-25 | Resolved: 2022-05-31

## 2022-05-31 PROBLEM — Z95.810 ICD (IMPLANTABLE CARDIOVERTER-DEFIBRILLATOR) IN PLACE: Status: RESOLVED | Noted: 2020-10-26 | Resolved: 2022-05-31

## 2022-05-31 LAB
CREAT UR-MCNC: 118.8 MG/DL (ref 63–166)
MICROALBUMIN UR-MCNC: 108.4 UG/ML
MICROALBUMIN/CREAT RATIO PNL UR: 91.2 MG/GM CR (ref 0–30)

## 2022-05-31 PROCEDURE — 3044F PR MOST RECENT HEMOGLOBIN A1C LEVEL <7.0%: ICD-10-PCS | Mod: CPTII,,, | Performed by: NURSE PRACTITIONER

## 2022-05-31 PROCEDURE — 82043 UR ALBUMIN QUANTITATIVE: CPT | Mod: TXP | Performed by: NURSE PRACTITIONER

## 2022-05-31 PROCEDURE — 3079F DIAST BP 80-89 MM HG: CPT | Mod: CPTII,,, | Performed by: NURSE PRACTITIONER

## 2022-05-31 PROCEDURE — 1160F RVW MEDS BY RX/DR IN RCRD: CPT | Mod: CPTII,,, | Performed by: NURSE PRACTITIONER

## 2022-05-31 PROCEDURE — 99214 PR OFFICE/OUTPT VISIT, EST, LEVL IV, 30-39 MIN: ICD-10-PCS | Mod: S$PBB,,, | Performed by: NURSE PRACTITIONER

## 2022-05-31 PROCEDURE — 4010F PR ACE/ARB THEARPY RXD/TAKEN: ICD-10-PCS | Mod: CPTII,,, | Performed by: NURSE PRACTITIONER

## 2022-05-31 PROCEDURE — 99215 OFFICE O/P EST HI 40 MIN: CPT | Mod: PBBFAC,PN,NTX | Performed by: NURSE PRACTITIONER

## 2022-05-31 PROCEDURE — 1159F PR MEDICATION LIST DOCUMENTED IN MEDICAL RECORD: ICD-10-PCS | Mod: CPTII,,, | Performed by: NURSE PRACTITIONER

## 2022-05-31 PROCEDURE — 1111F DSCHRG MED/CURRENT MED MERGE: CPT | Mod: CPTII,,, | Performed by: NURSE PRACTITIONER

## 2022-05-31 PROCEDURE — 3074F SYST BP LT 130 MM HG: CPT | Mod: CPTII,,, | Performed by: NURSE PRACTITIONER

## 2022-05-31 PROCEDURE — 3008F PR BODY MASS INDEX (BMI) DOCUMENTED: ICD-10-PCS | Mod: CPTII,,, | Performed by: NURSE PRACTITIONER

## 2022-05-31 PROCEDURE — 99214 OFFICE O/P EST MOD 30 MIN: CPT | Mod: S$PBB,,, | Performed by: NURSE PRACTITIONER

## 2022-05-31 PROCEDURE — 3008F BODY MASS INDEX DOCD: CPT | Mod: CPTII,,, | Performed by: NURSE PRACTITIONER

## 2022-05-31 PROCEDURE — 3079F PR MOST RECENT DIASTOLIC BLOOD PRESSURE 80-89 MM HG: ICD-10-PCS | Mod: CPTII,,, | Performed by: NURSE PRACTITIONER

## 2022-05-31 PROCEDURE — 3044F HG A1C LEVEL LT 7.0%: CPT | Mod: CPTII,,, | Performed by: NURSE PRACTITIONER

## 2022-05-31 PROCEDURE — 1111F PR DISCHARGE MEDS RECONCILED W/ CURRENT OUTPATIENT MED LIST: ICD-10-PCS | Mod: CPTII,,, | Performed by: NURSE PRACTITIONER

## 2022-05-31 PROCEDURE — 4010F ACE/ARB THERAPY RXD/TAKEN: CPT | Mod: CPTII,,, | Performed by: NURSE PRACTITIONER

## 2022-05-31 PROCEDURE — 1159F MED LIST DOCD IN RCRD: CPT | Mod: CPTII,,, | Performed by: NURSE PRACTITIONER

## 2022-05-31 PROCEDURE — 3074F PR MOST RECENT SYSTOLIC BLOOD PRESSURE < 130 MM HG: ICD-10-PCS | Mod: CPTII,,, | Performed by: NURSE PRACTITIONER

## 2022-05-31 PROCEDURE — 1160F PR REVIEW ALL MEDS BY PRESCRIBER/CLIN PHARMACIST DOCUMENTED: ICD-10-PCS | Mod: CPTII,,, | Performed by: NURSE PRACTITIONER

## 2022-05-31 RX ORDER — MILRINONE LACTATE 1 MG/ML
INJECTION INTRAVENOUS
Status: ON HOLD | COMMUNITY
Start: 2022-05-27 | End: 2022-07-27 | Stop reason: HOSPADM

## 2022-05-31 RX ORDER — TRAMADOL HYDROCHLORIDE 50 MG/1
50 TABLET ORAL EVERY 6 HOURS PRN
Qty: 28 TABLET | Refills: 0 | Status: SHIPPED | OUTPATIENT
Start: 2022-05-31 | End: 2022-09-01

## 2022-05-31 RX ORDER — PROMETHAZINE HYDROCHLORIDE 12.5 MG/1
12.5 TABLET ORAL EVERY 6 HOURS PRN
Qty: 30 TABLET | Refills: 3 | Status: ON HOLD | OUTPATIENT
Start: 2022-05-31 | End: 2022-07-27 | Stop reason: HOSPADM

## 2022-05-31 NOTE — ASSESSMENT & PLAN NOTE
Continue Lexapro 5mg po daily  Practice deep breathing or abdominal breathing exercises when anxiety occurs.  Exercise daily. Get sunlight daily.  Avoid caffeine, alcohol and stimulants.  Practice positive phrases and repeat throughout the day, yoga, lavender scents or Chamomile tea will help anxiety.  Set healthy boundaries, avoid people and conversations that increase stress.  Reports any symptoms of suicidal or homicidal ideations immediately, if clinic is closed go to nearest emergency room.

## 2022-05-31 NOTE — ASSESSMENT & PLAN NOTE
Add iron rich foods to diet such as liver, lean beef, eggs, dried fruit, dark green leafy vegetables.

## 2022-05-31 NOTE — ASSESSMENT & PLAN NOTE
Established with Nephrology, call and schedule a FU appt.  GFR 58.6  Follow renoprotective measures including Renal Diet (reduce intake of nuts, peanut butter, milk, cheese, dried beans, peas) and Low Sodium Diet (less than 2 grams per day).  Avoid NSAIDs (Aleve, Mobic, Celebrex, Ibuprofen, Advil, Toradol and Diclofenac). May take Tylenol as needed for headache/pain.  Control DM with goal A1C <7. BP goal <130/80. LDL goal < 100.  Stay well hydrated. Avoid alcohol and soda. Limit tea and coffee.  Smoking Cessation recommended.

## 2022-05-31 NOTE — ASSESSMENT & PLAN NOTE
Continue Remeron  Avoid caffeine, alcohol and stimulants. Do not use illicit drugs.  Practice positive phrases and repeat throughout the day.  Try yoga, lavender scents or Chamomile tea to promote relaxation.  Set healthy boundaries, avoid people and conversations that increase stress.  Avoid caffeinated beverages after lunch  Avoid alcohol near bedtime (eg, late afternoon and evening)  Avoid smoking or other nicotine intake, particularly during the evening  Exercise regularly for at least 20 minutes, preferably more than four to five hours prior to bedtime  Avoid daytime naps, especially if they are longer than 20 to 30 minutes or occur late in the day  Resolve concerns or worries before bedtime  Try not to force sleep    Sleep Hygiene Techniques: Sleep hygiene refers to actions that tend to improve and maintain good sleep  Power down electronic devices at least one hour prior to bedtime.  Keep room dark; use eye mask or relaxation sound machine to promote rest.  Sleep as long as necessary to feel rested (usually seven to eight hours for adults) and then get out of bed  Maintain a regular sleep schedule, particularly a regular wake-up time in the morning

## 2022-05-31 NOTE — ASSESSMENT & PLAN NOTE
Pending referral to ortho  Ultram filled today, usually gets from [facility].  Encouraged him to FU with them and obtain further meds from them.

## 2022-06-02 NOTE — PHYSICIAN QUERY
PT Name: Kevan Queen  MR #: [id number]     DOCUMENTATION CLARIFICATION     CDS/: Maude Fontana RN              Contact information: [email or web address]  This form is a permanent document in the medical record.     Query Date: June 2, 2022    By submitting this query, we are merely seeking further clarification of documentation to reflect the severity of illness of your patient. Please utilize your independent clinical judgment when addressing the question(s) below.    The medical record reflects the following:     Indicators   Supporting Clinical Findings Location in Medical Record   x Diabetes  documented Patient was admitted with DKA          DKA (diabetic ketoacidosis)  -  Presents with DKA.  - Corrected Sodium is 135.  - Will use DKA protocol for repleting electrolytes (K and Phos)  -  gentle 0.9 NaCl bolus given his HFrEF   - Hold Entresto, Spironolactone and Dig given ROC  - BMP Q 6 hours while on insulin gtt for DKA    - Switch his Milirone to  at lower dose in one hour (to avoid accumulation of Milirone)  - Consult endocrine for further recommendation on Type I insulin   - Device check (ordered) [place] given his pain in the left shoulder      5/25  Endo on board. He is off insulin drip. On levemir and sliding scale.        On admission, blood sugar elevated to 853 with no ketoacidosis, anion gap noted  on labs.  Patient was started on insulin gtt with IV fluids      Uncontrolled diabetes mellitus  Consulted for:   [facility]  DM type:  T2D diagnosed 4/2021 w/o h/o med use  A1C: 9.2 H&P 5/24       Transplant Heart      Progress Note 5/25       Transplant Heart                                          Progress Note  5/27        Endocrinology         Progress Note  5/27        Endocrinology    x Lab Value(s), POCT glucose value(s) Glucose 853 (HH) 784 (HH) 168 (H) 357 (H) 264 (H)       Labs 5/24, 5/24, 5/24, 5/25, 5/25   x Beta-OHxy Butyric Acid levels Beta-Hydroxybutyrate 0.2      Lab 5/24   x Serum  Osmolarity Osmolality 313 (H)      Lab 5/24    pH     x Anion gap/ Bicarb levels Anion Gap 13 14      Las 5/24, 5/25   x Treatment/Medication Hyperosmolar hyperglycemic state (HHS)  Resolving  Patient was started on insulin gtt on admission, transitioned to subcutaneous insulin 5/25. Progress Note 5/27       Endocrinology    Other       Provider, please clarify the Diabetes diagnosis associated with the above indicators.    [   ] Diabetes mellitus Type 1 with DKA (Diabetic Ketoacidosis)     [   ] Diabetes mellitus Type 1 with HHS (Hyperosmolar Hyperglycemic State)     [   ] Diabetes mellitus Type 2 with DKA (Diabetic Ketoacidosis)     [xxx   ] Diabetes mellitus Type 2 with HHS (Hyperosmolar Hyperglycemic State)     [   ] Other (please specify): ____________     [   ]  Clinically Undetermined       Please document in your progress notes daily for the duration of treatment until resolved, and include in your discharge summary.

## 2022-06-02 NOTE — PHYSICIAN QUERY
PT Name: Kevan Queen  MR #: [id number]     DOCUMENTATION CLARIFICATION     CDS/: Maude Fontana RN             Contact information: [email or web address]  This form is a permanent document in the medical record.     Query Date: June 2, 2022    By submitting this query, we are merely seeking further clarification of documentation.  Please utilize your independent clinical judgment when addressing the question(s) below.    The Medical Record contains the following   Indicators Supporting Clinical Findings Location in Medical Record   x Heart Failure documented recently diagnosed NICM admitted with ADHF/cardiogenic shock on home milrinone.      Chronic combined systolic and diastolic congestive heart failure  On GDMT along with Milrinone. SBO above 110 in ER. He appears dry on Exam.  Milrinone held at 4.30  and start Dobutamine at 6 pm      Chronic combined systolic and diastolic congestive heart failure  On Dobutamine 5 mcg.  Will start entresto once his creatinine come below 2   H&P 5/24       Transplant Heart      H&P 5/24       Transplant Heart            Progress Note 5/25       Transplant Heart   x BNP BNP 49 H&P 5/24       Transplant Heart     x EF/Echo Most recent  echo shows EF 15% with reduced systolic and diastolic dysfunction. Consult 5/25       Endocrinology     x Radiology findings The lungs are clear, with normal appearance of pulmonary vasculature and no pleural effusion or pneumothorax.  CXR 5/24    Subjective/Objective Respiratory Conditions      Recent/Current MI      Heart Transplant, LVAD     x Edema, JVD  No JVD. Consult 5/24       Cardiology    Ascites     x Diuretics/Meds His milrinone was changed to Dobutamine. Lasix , entresto and Aldlactone was held. Endocrine was consulted and his insulin was adjusted. Eventually his dobutamine was changed to milrinone after creatinine came to baseline. Also entresto was discontinued  as systolic blood pressures have been between 90 to 100.  Discharge Summary 5/27       Transplant Heart    Other Treatment      Other       Heart failure is a clinical diagnosis which includes symptomatic fluid retention, elevated intracardiac pressures, and/or the inability of the heart to deliver adequate blood flow.     Heart Failure with reduced Ejection Fraction (HFrEF) or Systolic Heart Failure (loses ability to contract normally, EF is <40%)     Heart Failure with preserved Ejection Fraction (HFpEF) or Diastolic Heart Failure (stiff ventricles, does not relax properly, EF is >50%)      Heart Failure with Combined Systolic and Diastolic Failure (stiff ventricles, does not relax properly and EF is <50%)     Mid-range or mildly reduced ejection fraction (HFmrEF) is classified as systolic heart failure.   Common clues to acute exacerbation:  Rapidly progressive symptoms (w/in 2 weeks of presentation), using IV diuretics, using supplemental O2, pulmonary edema on Xray, new or worsening pleural effusion, +JVD or other signs of volume overload, MI w/in 4 weeks, and/or BNP >500  The clinical guidelines noted are only system guidelines, and do not replace the providers clinical judgment.    Provider, please clarify the conflicting documentation regarding the acuity of the Congestive Heart Failure diagnosis associated with the above clinical findings.    [   ]  Acute on Chronic Combined Systolic and Diastolic Heart Failure - worsening of CHF signs/symptoms in preexisting CHF     [ xx  ]  Chronic Combined Systolic and Diastolic Heart Failure - pre-existing and stable     [   ]  Other (please specify): ___________________________________     [   ]  Clinically Undetermined         Please document in your progress notes daily for the duration of treatment until resolved and include in your discharge summary.    References:  American Heart Association editorial staff. (2017, May). Ejection Fraction Heart Failure Measurement. American Heart Association.  https://www.heart.org/en/health-topics/heart-failure/diagnosing-heart-failure/ejection-fraction-heart-failure-measurement#:~:text=Ejection%20fraction%20(EF)%20is%20a,pushed%20out%20with%20each%20heartbeat  PHILIPP Jain (2020, December 15). Heart failure with preserved ejection fraction: Clinical manifestations and diagnosis. [employer]. https://www.[employer]/contents/heart-failure-with-preserved-ejection-fraction-clinical-manifestations-and-diagnosis.  ICD-10-CM/PCS Coding Clinic Third Quarter ICD-10, Effective with discharges: September 8, 2020 [facility] [facility] [employer] § Heart failure with mid-range or mildly reduced ejection fraction (2020).  Form No. 32504

## 2022-06-03 ENCOUNTER — TELEPHONE (OUTPATIENT)
Dept: TRANSPLANT | Facility: CLINIC | Age: 37
End: 2022-06-03
Payer: MEDICAID

## 2022-06-06 LAB
ALP ISOS SERPL LEV INH-CCNC: 98 U/L
BILIRUBIN TOTAL: 0.3
BNP: 898
EXT ALBUMIN: 4.4
EXT ALT: 31
EXT AST: 38
EXT BUN: 19
EXT CALCIUM: 9.7
EXT CHLORIDE: 101
EXT CREATININE: 1.64 MG/DL
EXT GLUCOSE: 148
EXT HEMATOCRIT: 36.8
EXT HEMOGLOBIN: 12.2
EXT MAGNESIUM: 1.8
EXT PLATELETS: 293
EXT POTASSIUM: 3.6
EXT PROTEIN TOTAL: 7.4
EXT SODIUM: 141 MMOL/L
EXT WBC: 11.4
POCT GLUCOSE: 304 MG/DL (ref 70–110)

## 2022-06-06 NOTE — PROGRESS NOTES
Outside labs reviewed and glucose is now much better at 148.  Creat 1.64, K 3.6.  No changes at this time.

## 2022-06-07 ENCOUNTER — PATIENT MESSAGE (OUTPATIENT)
Dept: TRANSPLANT | Facility: CLINIC | Age: 37
End: 2022-06-07
Payer: MEDICAID

## 2022-06-09 ENCOUNTER — TELEPHONE (OUTPATIENT)
Dept: TRANSPLANT | Facility: CLINIC | Age: 37
End: 2022-06-09
Payer: MEDICAID

## 2022-06-09 ENCOUNTER — PATIENT MESSAGE (OUTPATIENT)
Dept: TRANSPLANT | Facility: CLINIC | Age: 37
End: 2022-06-09
Payer: MEDICAID

## 2022-06-09 NOTE — TELEPHONE ENCOUNTER
Pt did not come to his appts yesterday.  I called both  and Mrs Queen, both phones are not accepting calls at this time.

## 2022-06-10 ENCOUNTER — TELEPHONE (OUTPATIENT)
Dept: TRANSPLANT | Facility: CLINIC | Age: 37
End: 2022-06-10
Payer: MEDICAID

## 2022-06-10 ENCOUNTER — HOSPITAL ENCOUNTER (EMERGENCY)
Facility: HOSPITAL | Age: 37
Discharge: LEFT BEFORE DISCHARGE | End: 2022-06-11
Payer: MEDICAID

## 2022-06-10 ENCOUNTER — PATIENT MESSAGE (OUTPATIENT)
Dept: TRANSPLANT | Facility: CLINIC | Age: 37
End: 2022-06-10
Payer: MEDICAID

## 2022-06-10 VITALS
RESPIRATION RATE: 18 BRPM | DIASTOLIC BLOOD PRESSURE: 73 MMHG | HEIGHT: 75 IN | SYSTOLIC BLOOD PRESSURE: 101 MMHG | HEART RATE: 75 BPM | WEIGHT: 213 LBS | TEMPERATURE: 98 F | BODY MASS INDEX: 26.49 KG/M2 | OXYGEN SATURATION: 97 %

## 2022-06-10 DIAGNOSIS — R60.9 SWELLING: ICD-10-CM

## 2022-06-10 PROCEDURE — 99283 EMERGENCY DEPT VISIT LOW MDM: CPT | Mod: 25,NTX

## 2022-06-10 NOTE — TELEPHONE ENCOUNTER
Mr Queen missed his appts, and did not call to reschedule.  His phone number was unreachable.  He responded to my message and said he was feeling sick.  I made a return appt on Monday June 13 with labs

## 2022-06-10 NOTE — TELEPHONE ENCOUNTER
I spoke with Lola at [facility]- pt did not show for labs or dressing change appt.  She was unable to reach him at all the numbers on file.

## 2022-06-13 ENCOUNTER — HOSPITAL ENCOUNTER (INPATIENT)
Facility: HOSPITAL | Age: 37
LOS: 45 days | Discharge: HOME | DRG: 001 | End: 2022-07-28
Attending: EMERGENCY MEDICINE | Admitting: INTERNAL MEDICINE
Payer: MEDICAID

## 2022-06-13 ENCOUNTER — TELEPHONE (OUTPATIENT)
Dept: TRANSPLANT | Facility: CLINIC | Age: 37
End: 2022-06-13
Payer: MEDICAID

## 2022-06-13 DIAGNOSIS — E11.65 UNCONTROLLED TYPE 2 DIABETES MELLITUS WITH HYPERGLYCEMIA: ICD-10-CM

## 2022-06-13 DIAGNOSIS — T14.8XXA SURGICAL WOUND PRESENT: ICD-10-CM

## 2022-06-13 DIAGNOSIS — R78.81 STAPHYLOCOCCUS EPIDERMIDIS BACTEREMIA: ICD-10-CM

## 2022-06-13 DIAGNOSIS — Z95.811 LVAD (LEFT VENTRICULAR ASSIST DEVICE) PRESENT: Primary | ICD-10-CM

## 2022-06-13 DIAGNOSIS — Z71.89 GOALS OF CARE, COUNSELING/DISCUSSION: ICD-10-CM

## 2022-06-13 DIAGNOSIS — N18.31 STAGE 3A CHRONIC KIDNEY DISEASE: ICD-10-CM

## 2022-06-13 DIAGNOSIS — R06.02 SOB (SHORTNESS OF BREATH): ICD-10-CM

## 2022-06-13 DIAGNOSIS — R56.9 SEIZURE-LIKE ACTIVITY: ICD-10-CM

## 2022-06-13 DIAGNOSIS — I42.0 DILATED CARDIOMYOPATHY: ICD-10-CM

## 2022-06-13 DIAGNOSIS — B95.7 STAPHYLOCOCCUS EPIDERMIDIS BACTEREMIA: ICD-10-CM

## 2022-06-13 DIAGNOSIS — E87.1 HYPONATREMIA: ICD-10-CM

## 2022-06-13 DIAGNOSIS — R00.0 TACHYCARDIA: ICD-10-CM

## 2022-06-13 DIAGNOSIS — I50.9 HEART FAILURE: ICD-10-CM

## 2022-06-13 DIAGNOSIS — R57.0 CARDIOGENIC SHOCK: ICD-10-CM

## 2022-06-13 DIAGNOSIS — Z71.89 ADVANCED CARE PLANNING/COUNSELING DISCUSSION: ICD-10-CM

## 2022-06-13 DIAGNOSIS — I50.43 ACUTE ON CHRONIC COMBINED SYSTOLIC AND DIASTOLIC HEART FAILURE, NYHA CLASS 4: ICD-10-CM

## 2022-06-13 DIAGNOSIS — Z99.11 ENCOUNTER FOR WEANING FROM VENTILATOR: ICD-10-CM

## 2022-06-13 DIAGNOSIS — Z00.6 EXAMINATION OF PARTICIPANT IN CLINICAL TRIAL: ICD-10-CM

## 2022-06-13 DIAGNOSIS — R07.9 CHEST PAIN: ICD-10-CM

## 2022-06-13 DIAGNOSIS — M79.89 LEFT UPPER EXTREMITY SWELLING: ICD-10-CM

## 2022-06-13 DIAGNOSIS — I20.0 UNSTABLE ANGINA PECTORIS: ICD-10-CM

## 2022-06-13 DIAGNOSIS — I50.9 HF (HEART FAILURE): ICD-10-CM

## 2022-06-13 DIAGNOSIS — R25.2 MUSCLE CRAMPING: ICD-10-CM

## 2022-06-13 DIAGNOSIS — I50.9 CHF (CONGESTIVE HEART FAILURE): ICD-10-CM

## 2022-06-13 DIAGNOSIS — R20.2 TINGLING IN EXTREMITIES: ICD-10-CM

## 2022-06-13 DIAGNOSIS — I50.9 CONGESTIVE HEART FAILURE, UNSPECIFIED HF CHRONICITY, UNSPECIFIED HEART FAILURE TYPE: ICD-10-CM

## 2022-06-13 DIAGNOSIS — I50.9 ACUTE DECOMPENSATED HEART FAILURE: ICD-10-CM

## 2022-06-13 DIAGNOSIS — R07.89 CHEST DISCOMFORT: ICD-10-CM

## 2022-06-13 DIAGNOSIS — I50.9 DECOMPENSATED HEART FAILURE: ICD-10-CM

## 2022-06-13 DIAGNOSIS — I42.8 NICM (NONISCHEMIC CARDIOMYOPATHY): ICD-10-CM

## 2022-06-13 DIAGNOSIS — Z51.5 PALLIATIVE CARE ENCOUNTER: ICD-10-CM

## 2022-06-13 DIAGNOSIS — I50.42 CHRONIC COMBINED SYSTOLIC AND DIASTOLIC CONGESTIVE HEART FAILURE: ICD-10-CM

## 2022-06-13 DIAGNOSIS — I50.43 ACUTE ON CHRONIC COMBINED SYSTOLIC AND DIASTOLIC CONGESTIVE HEART FAILURE, NYHA CLASS 4: ICD-10-CM

## 2022-06-13 DIAGNOSIS — N18.32 STAGE 3B CHRONIC KIDNEY DISEASE: ICD-10-CM

## 2022-06-13 LAB
ALBUMIN SERPL BCP-MCNC: 3.6 G/DL (ref 3.5–5.2)
ALLENS TEST: ABNORMAL
ALLENS TEST: NORMAL
ALLENS TEST: NORMAL
ALP SERPL-CCNC: 81 U/L (ref 55–135)
ALT SERPL W/O P-5'-P-CCNC: 19 U/L (ref 10–44)
ANION GAP SERPL CALC-SCNC: 13 MMOL/L (ref 8–16)
ANION GAP SERPL CALC-SCNC: 13 MMOL/L (ref 8–16)
ANION GAP SERPL CALC-SCNC: 14 MMOL/L (ref 8–16)
APTT BLDCRRT: 26.8 SEC (ref 21–32)
AST SERPL-CCNC: 33 U/L (ref 10–40)
BASOPHILS # BLD AUTO: 0.02 K/UL (ref 0–0.2)
BASOPHILS # BLD AUTO: 0.03 K/UL (ref 0–0.2)
BASOPHILS NFR BLD: 0.2 % (ref 0–1.9)
BASOPHILS NFR BLD: 0.4 % (ref 0–1.9)
BILIRUB SERPL-MCNC: 1.1 MG/DL (ref 0.1–1)
BNP SERPL-MCNC: 1701 PG/ML (ref 0–99)
BUN SERPL-MCNC: 18 MG/DL (ref 6–20)
BUN SERPL-MCNC: 18 MG/DL (ref 6–20)
BUN SERPL-MCNC: 19 MG/DL (ref 6–20)
CALCIUM SERPL-MCNC: 8.6 MG/DL (ref 8.7–10.5)
CALCIUM SERPL-MCNC: 8.6 MG/DL (ref 8.7–10.5)
CALCIUM SERPL-MCNC: 9.1 MG/DL (ref 8.7–10.5)
CHLORIDE SERPL-SCNC: 102 MMOL/L (ref 95–110)
CHLORIDE SERPL-SCNC: 102 MMOL/L (ref 95–110)
CHLORIDE SERPL-SCNC: 103 MMOL/L (ref 95–110)
CO2 SERPL-SCNC: 18 MMOL/L (ref 23–29)
CO2 SERPL-SCNC: 21 MMOL/L (ref 23–29)
CO2 SERPL-SCNC: 23 MMOL/L (ref 23–29)
CREAT SERPL-MCNC: 1.7 MG/DL (ref 0.5–1.4)
CREAT SERPL-MCNC: 1.8 MG/DL (ref 0.5–1.4)
CREAT SERPL-MCNC: 1.8 MG/DL (ref 0.5–1.4)
DELSYS: ABNORMAL
DELSYS: NORMAL
DELSYS: NORMAL
DIFFERENTIAL METHOD: ABNORMAL
DIFFERENTIAL METHOD: ABNORMAL
EOSINOPHIL # BLD AUTO: 0 K/UL (ref 0–0.5)
EOSINOPHIL # BLD AUTO: 0 K/UL (ref 0–0.5)
EOSINOPHIL NFR BLD: 0.3 % (ref 0–8)
EOSINOPHIL NFR BLD: 0.5 % (ref 0–8)
ERYTHROCYTE [DISTWIDTH] IN BLOOD BY AUTOMATED COUNT: 15.1 % (ref 11.5–14.5)
ERYTHROCYTE [DISTWIDTH] IN BLOOD BY AUTOMATED COUNT: 15.1 % (ref 11.5–14.5)
EST. GFR  (AFRICAN AMERICAN): 54.4 ML/MIN/1.73 M^2
EST. GFR  (AFRICAN AMERICAN): 54.4 ML/MIN/1.73 M^2
EST. GFR  (AFRICAN AMERICAN): 58.3 ML/MIN/1.73 M^2
EST. GFR  (NON AFRICAN AMERICAN): 47 ML/MIN/1.73 M^2
EST. GFR  (NON AFRICAN AMERICAN): 47 ML/MIN/1.73 M^2
EST. GFR  (NON AFRICAN AMERICAN): 50.4 ML/MIN/1.73 M^2
GLUCOSE SERPL-MCNC: 169 MG/DL (ref 70–110)
GLUCOSE SERPL-MCNC: 170 MG/DL (ref 70–110)
GLUCOSE SERPL-MCNC: 175 MG/DL (ref 70–110)
HCO3 UR-SCNC: 24 MMOL/L (ref 24–28)
HCO3 UR-SCNC: 25.5 MMOL/L (ref 24–28)
HCO3 UR-SCNC: 26 MMOL/L (ref 24–28)
HCT VFR BLD AUTO: 31 % (ref 40–54)
HCT VFR BLD AUTO: 32.9 % (ref 40–54)
HGB BLD-MCNC: 10.4 G/DL (ref 14–18)
HGB BLD-MCNC: 11 G/DL (ref 14–18)
IMM GRANULOCYTES # BLD AUTO: 0.02 K/UL (ref 0–0.04)
IMM GRANULOCYTES # BLD AUTO: 0.03 K/UL (ref 0–0.04)
IMM GRANULOCYTES NFR BLD AUTO: 0.2 % (ref 0–0.5)
IMM GRANULOCYTES NFR BLD AUTO: 0.4 % (ref 0–0.5)
INR PPP: 1.2 (ref 0.8–1.2)
LACTATE SERPL-SCNC: 1.3 MMOL/L (ref 0.5–2.2)
LDH SERPL L TO P-CCNC: 1.11 MMOL/L (ref 0.5–2.2)
LDH SERPL L TO P-CCNC: 1.13 MMOL/L (ref 0.5–2.2)
LYMPHOCYTES # BLD AUTO: 1.9 K/UL (ref 1–4.8)
LYMPHOCYTES # BLD AUTO: 2.4 K/UL (ref 1–4.8)
LYMPHOCYTES NFR BLD: 21.6 % (ref 18–48)
LYMPHOCYTES NFR BLD: 29.2 % (ref 18–48)
MAGNESIUM SERPL-MCNC: 1.6 MG/DL (ref 1.6–2.6)
MAGNESIUM SERPL-MCNC: 1.8 MG/DL (ref 1.6–2.6)
MCH RBC QN AUTO: 30.7 PG (ref 27–31)
MCH RBC QN AUTO: 30.8 PG (ref 27–31)
MCHC RBC AUTO-ENTMCNC: 33.4 G/DL (ref 32–36)
MCHC RBC AUTO-ENTMCNC: 33.5 G/DL (ref 32–36)
MCV RBC AUTO: 91 FL (ref 82–98)
MCV RBC AUTO: 92 FL (ref 82–98)
MONOCYTES # BLD AUTO: 0.6 K/UL (ref 0.3–1)
MONOCYTES # BLD AUTO: 0.7 K/UL (ref 0.3–1)
MONOCYTES NFR BLD: 7.4 % (ref 4–15)
MONOCYTES NFR BLD: 8.4 % (ref 4–15)
NEUTROPHILS # BLD AUTO: 5 K/UL (ref 1.8–7.7)
NEUTROPHILS # BLD AUTO: 6.1 K/UL (ref 1.8–7.7)
NEUTROPHILS NFR BLD: 61.1 % (ref 38–73)
NEUTROPHILS NFR BLD: 70.3 % (ref 38–73)
NRBC BLD-RTO: 0 /100 WBC
NRBC BLD-RTO: 0 /100 WBC
PCO2 BLDA: 41.6 MMHG (ref 35–45)
PCO2 BLDA: 44 MMHG (ref 35–45)
PCO2 BLDA: 44.7 MMHG (ref 35–45)
PH SMN: 7.37 [PH] (ref 7.35–7.45)
PHOSPHATE SERPL-MCNC: 2.7 MG/DL (ref 2.7–4.5)
PLATELET # BLD AUTO: 217 K/UL (ref 150–450)
PLATELET # BLD AUTO: 221 K/UL (ref 150–450)
PMV BLD AUTO: 10 FL (ref 9.2–12.9)
PMV BLD AUTO: 10 FL (ref 9.2–12.9)
PO2 BLDA: 19 MMHG (ref 40–60)
PO2 BLDA: 22 MMHG (ref 40–60)
PO2 BLDA: 23 MMHG (ref 40–60)
POC BE: -1 MMOL/L
POC BE: 0 MMOL/L
POC BE: 1 MMOL/L
POC SATURATED O2: 29 % (ref 95–100)
POC SATURATED O2: 37 % (ref 95–100)
POC SATURATED O2: 39 % (ref 95–100)
POC SVO2: 29 %
POC TCO2: 25 MMOL/L (ref 24–29)
POC TCO2: 27 MMOL/L (ref 24–29)
POC TCO2: 27 MMOL/L (ref 24–29)
POCT GLUCOSE: 129 MG/DL (ref 70–110)
POCT GLUCOSE: 184 MG/DL (ref 70–110)
POTASSIUM SERPL-SCNC: 3.5 MMOL/L (ref 3.5–5.1)
POTASSIUM SERPL-SCNC: 3.6 MMOL/L (ref 3.5–5.1)
POTASSIUM SERPL-SCNC: 3.9 MMOL/L (ref 3.5–5.1)
PROT SERPL-MCNC: 7.2 G/DL (ref 6–8.4)
PROTHROMBIN TIME: 12.7 SEC (ref 9–12.5)
RBC # BLD AUTO: 3.39 M/UL (ref 4.6–6.2)
RBC # BLD AUTO: 3.57 M/UL (ref 4.6–6.2)
SAMPLE: ABNORMAL
SAMPLE: NORMAL
SAMPLE: NORMAL
SARS-COV-2 RDRP RESP QL NAA+PROBE: NEGATIVE
SITE: ABNORMAL
SITE: NORMAL
SITE: NORMAL
SODIUM SERPL-SCNC: 135 MMOL/L (ref 136–145)
SODIUM SERPL-SCNC: 136 MMOL/L (ref 136–145)
SODIUM SERPL-SCNC: 138 MMOL/L (ref 136–145)
TROPONIN I SERPL DL<=0.01 NG/ML-MCNC: 0.24 NG/ML (ref 0–0.03)
TROPONIN I SERPL DL<=0.01 NG/ML-MCNC: 0.24 NG/ML (ref 0–0.03)
TROPONIN I SERPL DL<=0.01 NG/ML-MCNC: 0.25 NG/ML (ref 0–0.03)
TROPONIN I SERPL DL<=0.01 NG/ML-MCNC: 0.28 NG/ML (ref 0–0.03)
VERBAL RESULT READBACK PERFORMED: YES
WBC # BLD AUTO: 8.21 K/UL (ref 3.9–12.7)
WBC # BLD AUTO: 8.67 K/UL (ref 3.9–12.7)

## 2022-06-13 PROCEDURE — 63600175 PHARM REV CODE 636 W HCPCS: Mod: NTX | Performed by: INTERNAL MEDICINE

## 2022-06-13 PROCEDURE — 20600001 HC STEP DOWN PRIVATE ROOM: Mod: NTX

## 2022-06-13 PROCEDURE — 82803 BLOOD GASES ANY COMBINATION: CPT | Mod: NTX

## 2022-06-13 PROCEDURE — 27100094 HC IABP, SET-UP: Mod: NTX

## 2022-06-13 PROCEDURE — 83880 ASSAY OF NATRIURETIC PEPTIDE: CPT | Mod: NTX

## 2022-06-13 PROCEDURE — 84100 ASSAY OF PHOSPHORUS: CPT | Mod: NTX | Performed by: NURSE PRACTITIONER

## 2022-06-13 PROCEDURE — 99285 EMERGENCY DEPT VISIT HI MDM: CPT | Mod: 25,NTX

## 2022-06-13 PROCEDURE — 85025 COMPLETE CBC W/AUTO DIFF WBC: CPT | Mod: 91,NTX

## 2022-06-13 PROCEDURE — 99900035 HC TECH TIME PER 15 MIN (STAT): Mod: NTX

## 2022-06-13 PROCEDURE — 25000003 PHARM REV CODE 250: Mod: NTX | Performed by: INTERNAL MEDICINE

## 2022-06-13 PROCEDURE — 93010 EKG 12-LEAD: ICD-10-PCS | Mod: NTX,,, | Performed by: INTERNAL MEDICINE

## 2022-06-13 PROCEDURE — 83605 ASSAY OF LACTIC ACID: CPT | Mod: NTX

## 2022-06-13 PROCEDURE — 83735 ASSAY OF MAGNESIUM: CPT | Mod: 91,NTX | Performed by: INTERNAL MEDICINE

## 2022-06-13 PROCEDURE — 25000003 PHARM REV CODE 250: Mod: NTX

## 2022-06-13 PROCEDURE — 27200234 HC IABP BALLOON

## 2022-06-13 PROCEDURE — 83605 ASSAY OF LACTIC ACID: CPT | Mod: NTX | Performed by: NURSE PRACTITIONER

## 2022-06-13 PROCEDURE — 93010 ELECTROCARDIOGRAM REPORT: CPT | Mod: NTX,,, | Performed by: INTERNAL MEDICINE

## 2022-06-13 PROCEDURE — 84484 ASSAY OF TROPONIN QUANT: CPT | Mod: 91,NTX

## 2022-06-13 PROCEDURE — 20000000 HC ICU ROOM: Mod: NTX

## 2022-06-13 PROCEDURE — 80048 BASIC METABOLIC PNL TOTAL CA: CPT | Mod: NTX,XB | Performed by: INTERNAL MEDICINE

## 2022-06-13 PROCEDURE — 83735 ASSAY OF MAGNESIUM: CPT | Mod: NTX | Performed by: NURSE PRACTITIONER

## 2022-06-13 PROCEDURE — 93005 ELECTROCARDIOGRAM TRACING: CPT | Mod: NTX

## 2022-06-13 PROCEDURE — 85730 THROMBOPLASTIN TIME PARTIAL: CPT | Mod: NTX | Performed by: INTERNAL MEDICINE

## 2022-06-13 PROCEDURE — 80048 BASIC METABOLIC PNL TOTAL CA: CPT | Mod: 91,NTX,XB | Performed by: NURSE PRACTITIONER

## 2022-06-13 PROCEDURE — 99223 PR INITIAL HOSPITAL CARE,LEVL III: ICD-10-PCS | Mod: NTX,,, | Performed by: INTERNAL MEDICINE

## 2022-06-13 PROCEDURE — 63600175 PHARM REV CODE 636 W HCPCS: Mod: NTX

## 2022-06-13 PROCEDURE — 94761 N-INVAS EAR/PLS OXIMETRY MLT: CPT | Mod: NTX

## 2022-06-13 PROCEDURE — 63600175 PHARM REV CODE 636 W HCPCS: Mod: NTX | Performed by: NURSE PRACTITIONER

## 2022-06-13 PROCEDURE — 99285 EMERGENCY DEPT VISIT HI MDM: CPT | Mod: NTX,,, | Performed by: EMERGENCY MEDICINE

## 2022-06-13 PROCEDURE — 99223 1ST HOSP IP/OBS HIGH 75: CPT | Mod: NTX,,, | Performed by: INTERNAL MEDICINE

## 2022-06-13 PROCEDURE — 80053 COMPREHEN METABOLIC PANEL: CPT | Mod: NTX

## 2022-06-13 PROCEDURE — 85025 COMPLETE CBC W/AUTO DIFF WBC: CPT | Mod: NTX | Performed by: NURSE PRACTITIONER

## 2022-06-13 PROCEDURE — 25000003 PHARM REV CODE 250: Mod: NTX | Performed by: NURSE PRACTITIONER

## 2022-06-13 PROCEDURE — 99285 PR EMERGENCY DEPT VISIT,LEVEL V: ICD-10-PCS | Mod: NTX,,, | Performed by: EMERGENCY MEDICINE

## 2022-06-13 PROCEDURE — 85610 PROTHROMBIN TIME: CPT | Mod: NTX | Performed by: INTERNAL MEDICINE

## 2022-06-13 PROCEDURE — U0002 COVID-19 LAB TEST NON-CDC: HCPCS | Mod: NTX | Performed by: INTERNAL MEDICINE

## 2022-06-13 PROCEDURE — 96374 THER/PROPH/DIAG INJ IV PUSH: CPT | Mod: NTX

## 2022-06-13 RX ORDER — POTASSIUM CHLORIDE 20 MEQ/1
60 TABLET, EXTENDED RELEASE ORAL ONCE
Status: COMPLETED | OUTPATIENT
Start: 2022-06-13 | End: 2022-06-13

## 2022-06-13 RX ORDER — ESCITALOPRAM OXALATE 5 MG/1
5 TABLET ORAL ONCE
Status: COMPLETED | OUTPATIENT
Start: 2022-06-13 | End: 2022-06-13

## 2022-06-13 RX ORDER — FENTANYL CITRATE 50 UG/ML
12.5 INJECTION, SOLUTION INTRAMUSCULAR; INTRAVENOUS ONCE
Status: COMPLETED | OUTPATIENT
Start: 2022-06-13 | End: 2022-06-13

## 2022-06-13 RX ORDER — SODIUM CHLORIDE 0.9 % (FLUSH) 0.9 %
10 SYRINGE (ML) INJECTION
Status: DISCONTINUED | OUTPATIENT
Start: 2022-06-13 | End: 2022-06-29

## 2022-06-13 RX ORDER — GLUCAGON 1 MG
1 KIT INJECTION
Status: DISCONTINUED | OUTPATIENT
Start: 2022-06-13 | End: 2022-06-21

## 2022-06-13 RX ORDER — MIRTAZAPINE 15 MG/1
15 TABLET, FILM COATED ORAL NIGHTLY
Status: DISCONTINUED | OUTPATIENT
Start: 2022-06-13 | End: 2022-06-29

## 2022-06-13 RX ORDER — MILRINONE LACTATE 0.2 MG/ML
0.38 INJECTION, SOLUTION INTRAVENOUS CONTINUOUS
Status: DISCONTINUED | OUTPATIENT
Start: 2022-06-13 | End: 2022-06-28

## 2022-06-13 RX ORDER — LANOLIN ALCOHOL/MO/W.PET/CERES
400 CREAM (GRAM) TOPICAL 2 TIMES DAILY
Status: DISCONTINUED | OUTPATIENT
Start: 2022-06-13 | End: 2022-06-18

## 2022-06-13 RX ORDER — IBUPROFEN 200 MG
16 TABLET ORAL
Status: DISCONTINUED | OUTPATIENT
Start: 2022-06-13 | End: 2022-06-21

## 2022-06-13 RX ORDER — LIDOCAINE HYDROCHLORIDE 10 MG/ML
INJECTION, SOLUTION EPIDURAL; INFILTRATION; INTRACAUDAL; PERINEURAL
Status: COMPLETED
Start: 2022-06-13 | End: 2022-06-13

## 2022-06-13 RX ORDER — ENOXAPARIN SODIUM 100 MG/ML
40 INJECTION SUBCUTANEOUS EVERY 24 HOURS
Status: DISCONTINUED | OUTPATIENT
Start: 2022-06-13 | End: 2022-06-13

## 2022-06-13 RX ORDER — IBUPROFEN 200 MG
24 TABLET ORAL
Status: DISCONTINUED | OUTPATIENT
Start: 2022-06-13 | End: 2022-06-21

## 2022-06-13 RX ORDER — PANTOPRAZOLE SODIUM 40 MG/1
40 TABLET, DELAYED RELEASE ORAL DAILY
Status: DISCONTINUED | OUTPATIENT
Start: 2022-06-13 | End: 2022-06-29

## 2022-06-13 RX ORDER — FUROSEMIDE 10 MG/ML
80 INJECTION INTRAMUSCULAR; INTRAVENOUS
Status: COMPLETED | OUTPATIENT
Start: 2022-06-13 | End: 2022-06-13

## 2022-06-13 RX ORDER — LANOLIN ALCOHOL/MO/W.PET/CERES
800 CREAM (GRAM) TOPICAL ONCE
Status: COMPLETED | OUTPATIENT
Start: 2022-06-13 | End: 2022-06-13

## 2022-06-13 RX ORDER — SUCRALFATE 1 G/1
1 TABLET ORAL NIGHTLY
Status: DISCONTINUED | OUTPATIENT
Start: 2022-06-13 | End: 2022-06-29

## 2022-06-13 RX ORDER — FUROSEMIDE 10 MG/ML
80 INJECTION INTRAMUSCULAR; INTRAVENOUS ONCE
Status: COMPLETED | OUTPATIENT
Start: 2022-06-13 | End: 2022-06-13

## 2022-06-13 RX ORDER — TALC
800 POWDER (GRAM) TOPICAL ONCE
Status: DISCONTINUED | OUTPATIENT
Start: 2022-06-13 | End: 2022-06-13

## 2022-06-13 RX ORDER — HEPARIN SODIUM,PORCINE/D5W 25000/250
0-40 INTRAVENOUS SOLUTION INTRAVENOUS CONTINUOUS
Status: DISCONTINUED | OUTPATIENT
Start: 2022-06-13 | End: 2022-06-29

## 2022-06-13 RX ORDER — LIDOCAINE HYDROCHLORIDE 20 MG/ML
INJECTION INTRAVENOUS
Status: DISPENSED
Start: 2022-06-13 | End: 2022-06-14

## 2022-06-13 RX ORDER — NAPROXEN SODIUM 220 MG/1
81 TABLET, FILM COATED ORAL DAILY
Status: DISCONTINUED | OUTPATIENT
Start: 2022-06-13 | End: 2022-06-29

## 2022-06-13 RX ORDER — POTASSIUM CHLORIDE 20 MEQ/1
40 TABLET, EXTENDED RELEASE ORAL 2 TIMES DAILY
Status: DISCONTINUED | OUTPATIENT
Start: 2022-06-13 | End: 2022-06-18

## 2022-06-13 RX ORDER — ACETAMINOPHEN 325 MG/1
650 TABLET ORAL EVERY 6 HOURS PRN
Status: DISCONTINUED | OUTPATIENT
Start: 2022-06-13 | End: 2022-06-16

## 2022-06-13 RX ORDER — PROMETHAZINE HYDROCHLORIDE 12.5 MG/1
12.5 TABLET ORAL EVERY 6 HOURS PRN
Status: DISCONTINUED | OUTPATIENT
Start: 2022-06-13 | End: 2022-06-29

## 2022-06-13 RX ADMIN — FUROSEMIDE 40 MG/HR: 10 INJECTION, SOLUTION INTRAMUSCULAR; INTRAVENOUS at 10:06

## 2022-06-13 RX ADMIN — FUROSEMIDE 80 MG: 10 INJECTION, SOLUTION INTRAMUSCULAR; INTRAVENOUS at 09:06

## 2022-06-13 RX ADMIN — LIDOCAINE HYDROCHLORIDE 50 MG: 10 INJECTION, SOLUTION EPIDURAL; INFILTRATION; INTRACAUDAL at 10:06

## 2022-06-13 RX ADMIN — ENOXAPARIN SODIUM 40 MG: 100 INJECTION SUBCUTANEOUS at 04:06

## 2022-06-13 RX ADMIN — FENTANYL CITRATE 12.5 MCG: 50 INJECTION INTRAMUSCULAR; INTRAVENOUS at 11:06

## 2022-06-13 RX ADMIN — MILRINONE LACTATE IN DEXTROSE 0.38 MCG/KG/MIN: 200 INJECTION, SOLUTION INTRAVENOUS at 09:06

## 2022-06-13 RX ADMIN — POTASSIUM CHLORIDE 60 MEQ: 1500 TABLET, EXTENDED RELEASE ORAL at 01:06

## 2022-06-13 RX ADMIN — ASPIRIN 81 MG CHEWABLE TABLET 81 MG: 81 TABLET CHEWABLE at 01:06

## 2022-06-13 RX ADMIN — MILRINONE LACTATE IN DEXTROSE 0.38 MCG/KG/MIN: 200 INJECTION, SOLUTION INTRAVENOUS at 01:06

## 2022-06-13 RX ADMIN — PROMETHAZINE HYDROCHLORIDE 12.5 MG: 12.5 TABLET ORAL at 07:06

## 2022-06-13 RX ADMIN — ACETAMINOPHEN 650 MG: 325 TABLET ORAL at 04:06

## 2022-06-13 RX ADMIN — Medication 800 MG: at 05:06

## 2022-06-13 RX ADMIN — PANTOPRAZOLE SODIUM 40 MG: 40 TABLET, DELAYED RELEASE ORAL at 01:06

## 2022-06-13 RX ADMIN — FUROSEMIDE 20 MG/HR: 10 INJECTION, SOLUTION INTRAMUSCULAR; INTRAVENOUS at 01:06

## 2022-06-13 RX ADMIN — EPINEPHRINE 0.02 MCG/KG/MIN: 1 INJECTION INTRAMUSCULAR; INTRAVENOUS; SUBCUTANEOUS at 09:06

## 2022-06-13 RX ADMIN — ESCITALOPRAM OXALATE 5 MG: 5 TABLET, FILM COATED ORAL at 12:06

## 2022-06-13 RX ADMIN — FUROSEMIDE 80 MG: 10 INJECTION INTRAMUSCULAR; INTRAVENOUS at 08:06

## 2022-06-13 NOTE — Clinical Note
The PA catheter was repositioned to the main pulmonary artery. Hemodynamics were performed. O2 saturation was measured.  CO =    CI =

## 2022-06-13 NOTE — TELEPHONE ENCOUNTER
I spoke with Malou Dumont, who asked about her son.  She is very concerned about his care and is anxious that he be transplanted.  She offered to put down a deposit.  I let her know that I am unsure about his plan of care as he was just admitted.  I am working with our  to determine pt's HCPOA, and whether I can release information to his mother, Malou Dumont.

## 2022-06-13 NOTE — H&P
"[facility] - Cardiac Intensive Care  Heart Transplant  H&P    Patient Name: Kevan Queen  MRN: [id number]  Admission Date: 6/13/2022  Attending Physician: Natalya Villatoro MD  Primary Care Provider: ORALIA Cline  Principal Problem:Acute on chronic combined systolic and diastolic heart failure, NYHA class 4    Subjective:     History of Present Illness:  38 yo male with NIDCM with BiV systolic heart failure, on home Milrinone at 0.375 mcg/kg/min, presented at [facility] 4/2/22 ,was not evaluated for OHT as he has recently quit smoking in April 2022 but was approved for VAD with plan to begin OHT evaluation in upcoming months if Mr Queen is stable and suitable for OHT eval (blood group A), 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 Went to ED at [facility] 6/10 but left after waiting 4 hours. Had clinic appointment with us today, but arrived to [facility] early this morning and decided to go to the ED instead. Baseline Lasix dose is 80 mg bid. Reports taking 240 mg qd past 3 days with no improvement. BNP is 1701, up from 898 on 6/2 and 49 on 5/24. sCr is 1.8 with baseline ~ 1.5-1.7. sPO2 on RA is 93%. Wife at bedside    He has been given Lasix 80 mg IVP in the ED with plan to start Lasix gtt at 20 mg/hr           Past Medical History:   Diagnosis Date    Arthritis     Cardiomyopathy     CHF (congestive heart failure) 10/01/2020    Diabetes mellitus     Dilated cardiomyopathy 10/26/2020    Drug abuse 10/2020    Hyperosmolar hyperglycemic state (HHS) 5/25/2022    ICD (implantable cardioverter-defibrillator) in place 10/26/2020    Renal disorder        Past Surgical History:   Procedure " Laterality Date    CARDIAC DEFIBRILLATOR PLACEMENT      RIGHT HEART CATHETERIZATION Right 2022    Procedure: INSERTION, CATHETER, RIGHT HEART;  Surgeon: Luca Lopez Jr., MD;  Location: [facility] CATH LAB;  Service: Cardiology;  Laterality: Right;    RIGHT HEART CATHETERIZATION Right 2022    Procedure: INSERTION, CATHETER, RIGHT HEART;  Surgeon: Josh Pulido MD;  Location: [facility] CATH LAB;  Service: Cardiology;  Laterality: Right;       Review of patient's allergies indicates:   Allergen Reactions    Bumex [bumetanide] Hives    Lactose Diarrhea     Other reaction(s): Abdominal distension, gaseous    Torsemide Hives       Current Facility-Administered Medications   Medication    aspirin chewable tablet 81 mg    busPIRone split tablet 7.5 mg    dextrose 10% bolus 125 mL    dextrose 10% bolus 250 mL    enoxaparin injection 40 mg    furosemide (LASIX) 200 mg in dextrose 5 % 100 mL continuous infusion (conc: 2 mg/mL)    glucagon (human recombinant) injection 1 mg    glucose chewable tablet 16 g    glucose chewable tablet 24 g    milrinone 20mg in D5W 100 mL infusion    mirtazapine tablet 15 mg    pantoprazole EC tablet 40 mg    potassium chloride SA CR tablet 40 mEq    promethazine tablet 12.5 mg    sodium chloride 0.9% flush 10 mL    sucralfate tablet 1 g     Family History       Problem Relation (Age of Onset)    Diverticulosis Brother    Heart attack Maternal Grandmother, Maternal Grandfather    Heart failure Father          Tobacco Use    Smoking status: Former Smoker     Packs/day: 0.50     Years: 16.00     Pack years: 8.00     Start date: 10/1/2004     Quit date: 2021     Years since quittin.1    Smokeless tobacco: Never Used   Substance and Sexual Activity    Alcohol use: Not Currently    Drug use: Not Currently     Types: Marijuana, MDMA (Ecstacy)    Sexual activity: Yes     Partners: Female     Birth control/protection: None     Review of Systems    Constitutional:  Positive for activity change, fatigue and unexpected weight change.   HENT: Negative.     Eyes: Negative.    Respiratory:  Positive for shortness of breath.    Cardiovascular:  Positive for leg swelling.   Gastrointestinal:  Positive for abdominal distention.   Endocrine: Negative.    Genitourinary: Negative.    Musculoskeletal:  Positive for arthralgias.   Skin: Negative.    Allergic/Immunologic: Negative.    Neurological: Negative.    Hematological: Negative.    Psychiatric/Behavioral: Negative.     Objective:     Vital Signs (Most Recent):  Temp: 98.4 °F (36.9 °C) (06/13/22 1409)  Pulse: 110 (06/13/22 1409)  Resp: 16 (06/13/22 1409)  BP: 110/81 (06/13/22 1409)  SpO2: (!) 94 % (06/13/22 1409)   Vital Signs (24h Range):  Temp:  [98.4 °F (36.9 °C)-99.6 °F (37.6 °C)] 98.4 °F (36.9 °C)  Pulse:  [] 110  Resp:  [15-20] 16  SpO2:  [93 %-98 %] 94 %  BP: (101-147)/(62-81) 110/81     Patient Vitals for the past 72 hrs (Last 3 readings):   Weight   06/13/22 1409 102.4 kg (225 lb 12 oz)   06/13/22 0753 101.2 kg (223 lb)     Body mass index is 28.22 kg/m².      Intake/Output Summary (Last 24 hours) at 6/13/2022 1426  Last data filed at 6/13/2022 1048  Gross per 24 hour   Intake --   Output 825 ml   Net -825 ml       Physical Exam  Constitutional:       Appearance: Normal appearance.   HENT:      Head: Normocephalic and atraumatic.   Eyes:      Conjunctiva/sclera: Conjunctivae normal.      Pupils: Pupils are equal, round, and reactive to light.   Neck:      Comments: JVP difficult to see but appears to be at jaw  Cardiovascular:      Rate and Rhythm: Regular rhythm. Tachycardia present.      Comments: +S3  Pulmonary:      Breath sounds: Normal breath sounds.      Comments: SOB at rest  Abdominal:      General: Bowel sounds are normal. There is distension.   Musculoskeletal:         General: Normal range of motion.      Cervical back: Neck supple.      Comments: 1+ TREVER   Skin:     General: Skin is warm  and dry.      Capillary Refill: Capillary refill takes 2 to 3 seconds.   Neurological:      General: No focal deficit present.      Mental Status: He is alert and oriented to person, place, and time.   Psychiatric:         Mood and Affect: Mood normal.         Behavior: Behavior normal.         Thought Content: Thought content normal.         Judgment: Judgment normal.       Significant Labs:  CBC:  Recent Labs   Lab 06/13/22  0901   WBC 8.67   RBC 3.57*   HGB 11.0*   HCT 32.9*      MCV 92   MCH 30.8   MCHC 33.4     BNP:  Recent Labs   Lab 06/13/22  0901   BNP 1,701*     CMP:  Recent Labs   Lab 06/13/22  0901   *   CALCIUM 9.1   ALBUMIN 3.6   PROT 7.2   *   K 3.5   CO2 18*      BUN 19   CREATININE 1.8*   ALKPHOS 81   ALT 19   AST 33   BILITOT 1.1*      Coagulation:   No results for input(s): PT, INR, APTT in the last 168 hours.  LDH:  No results for input(s): LDH in the last 72 hours.  Microbiology:  Microbiology Results (last 7 days)       ** No results found for the last 168 hours. **            I have reviewed all pertinent labs within the past 24 hours.    Diagnostic Results:  I have reviewed and interpreted all pertinent imaging results/findings within the past 24 hours.    Assessment/Plan:     * Acute on chronic combined systolic and diastolic heart failure, NYHA class 4  -[facility]  -Approved for LVAD at [facility] on 4/2/22. Was not evaluated for OHTx as he quit smoking in April 2022  -TTE done 4/19/22: LVEDD 7.3, LVEF 15%, diastolic dysfunction, RV severely dilated and RV function mod depressed, mod MR, mild TR, PAS > 31 and IVC 3. Will repeat TTE  -RHC done 4/19/22 on Milrinone 0.5 mcg/kg/min: RA 11, PA 50/27 (35), W 27, CO/CI 3.7/1.7, PVR 2.2 and SVR 1535   -GDMT: Entresto and Aldactone on hold since admit 5/24-5/27 2/2 elevated sCr  -Baseline dose of Lasix is 80 mg bid. He has taken 240 mg qd for the past 3 days with no improvement in SOB at rest  -Reports dry weight is 217#, and  that he has gained 6# on his home scale  -Lasix 80 mg IVP given in the ED with plan to start Lasix 20 mg/hr  -Consider repeating RHC on Milrinone 0.375 mcg/kg/min once euvolemic      Uncontrolled diabetes mellitus  -Admitted 5/24-5/27 with hyperglycemia hyperosmolar syndrome  -Hgb A1C 9.2 on 5/20/22  -Glucose per labs today 169  -SSI for now    CKD (chronic kidney disease) stage 3, GFR 30-59 ml/min  -Admit sCr 1.8, baseline ~ 1.5-1.7  -Monitor [place] [id number]  Heart Transplant  [facility] - Cardiac Intensive [facility]

## 2022-06-13 NOTE — ASSESSMENT & PLAN NOTE
-[facility]  -Approved for LVAD at [facility] on 4/2/22. Was not evaluated for OHTx as he quit smoking in April 2022  -TTE done 4/19/22: LVEDD 7.3, LVEF 15%, diastolic dysfunction, RV severely dilated and RV function mod depressed, mod MR, mild TR, PAS > 31 and IVC 3. Will repeat TTE  -RHC done 4/19/22 on Milrinone 0.5 mcg/kg/min: RA 11, PA 50/27 (35), W 27, CO/CI 3.7/1.7, PVR 2.2 and SVR 1535   -GDMT: Entresto and Aldactone on hold since admit 5/24-5/27 2/2 elevated sCr  -Baseline dose of Lasix is 80 mg bid. He has taken 240 mg qd for the past 3 days with no improvement in SOB at rest  -Reports dry weight is 217#, and that he has gained 6# on his home scale  -Lasix 80 mg IVP given in the ED with plan to start Lasix 20 mg/hr  -Consider repeating RHC on Milrinone 0.375 mcg/kg/min once euvolemic

## 2022-06-13 NOTE — HPI
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 Went to ED at [facility] 6/10 but left after waiting 4 hours. Had clinic appointment with us today, but arrived to [facility] early this morning and decided to go to the ED instead. Baseline Lasix dose is 80 mg bid. Reports taking 240 mg qd past 3 days with no improvement. BNP is 1701, up from 898 on 6/2 and 49 on 5/24. sCr is 1.8 with baseline ~ 1.5-1.7. sPO2 on RA is 93%. Wife at bedside    He has been given Lasix 80 mg IVP in the ED with plan to start Lasix gtt at 20 mg/hr       "

## 2022-06-13 NOTE — ED PROVIDER NOTES
Encounter Date: 6/13/2022       History     Chief Complaint   Patient presents with    Chest Pain     Chf on primacor drip,      Mr Thacker is a 36yo gentleman with PMHx of HFrEF (15%), HTN, HLD, IDDM, and CKD-III who presented to the ED for increased swelling of the extremities and abdomen accompanied with chest pain. The swelling has been ongoing since 6/06/22 and is most prominent in the lower back and abdomen. Sharp central chest pain (9/10) has been periodic, occurring on exertion or at rest for periods up to an hour. It eventually goes away on its own. Pt reported that he usually sleeps inclined and has been frequently SOB on exertion. No endorsement of HA, dizziness, fever, chills, abdominal pain, nausea, emesis, or change in bladder/bowel habits. Pt acknowledged having non-radiating, right ankle pain at the medial and later malleolus, worse on inversion since 6/10/22. Prior right ankle X-ray revealed no acute osseous process. Pt is curently a heart transplant patient and is seen by Dr Guajardo.         Review of patient's allergies indicates:   Allergen Reactions    Bumex [bumetanide] Hives    Lactose Diarrhea     Other reaction(s): Abdominal distension, gaseous    Torsemide Hives     Past Medical History:   Diagnosis Date    Arthritis     Cardiomyopathy     CHF (congestive heart failure) 10/01/2020    Diabetes mellitus     Dilated cardiomyopathy 10/26/2020    Drug abuse 10/2020    Hyperosmolar hyperglycemic state (HHS) 5/25/2022    ICD (implantable cardioverter-defibrillator) in place 10/26/2020    Renal disorder      Past Surgical History:   Procedure Laterality Date    CARDIAC DEFIBRILLATOR PLACEMENT      RIGHT HEART CATHETERIZATION Right 4/8/2022    Procedure: INSERTION, CATHETER, RIGHT HEART;  Surgeon: Luca Lopez Jr., MD;  Location: [facility] CATH LAB;  Service: Cardiology;  Laterality: Right;    RIGHT HEART CATHETERIZATION Right 4/19/2022    Procedure: INSERTION, CATHETER, RIGHT  HEART;  Surgeon: Josh Pulido MD;  Location: [facility] CATH LAB;  Service: Cardiology;  Laterality: Right;     Family History   Problem Relation Age of Onset    Heart failure Father     Diverticulosis Brother     Heart attack Maternal Grandmother     Heart attack Maternal Grandfather      Social History     Tobacco Use    Smoking status: Former Smoker     Packs/day: 0.50     Years: 16.00     Pack years: 8.00     Start date: 10/1/2004     Quit date: 2021     Years since quittin.1    Smokeless tobacco: Never Used   Substance Use Topics    Alcohol use: Not Currently    Drug use: Not Currently     Types: Marijuana, MDMA (Ecstacy)     Review of Systems   Constitutional: Negative for activity change, appetite change, chills, fatigue and fever.   HENT: Negative for congestion, ear pain, postnasal drip, rhinorrhea and sore throat.    Eyes: Negative for visual disturbance.   Respiratory: Positive for shortness of breath. Negative for apnea, cough, choking, chest tightness and wheezing.    Cardiovascular: Positive for chest pain, palpitations and leg swelling.   Gastrointestinal: Positive for abdominal distention. Negative for abdominal pain, constipation, diarrhea, nausea and vomiting.   Endocrine: Negative for cold intolerance, heat intolerance and polyuria.   Genitourinary: Negative for difficulty urinating and flank pain.   Musculoskeletal: Positive for arthralgias. Negative for back pain and gait problem.   Allergic/Immunologic: Negative for immunocompromised state.   Neurological: Negative for dizziness and headaches.   Hematological: Negative for adenopathy. Does not bruise/bleed easily.   Psychiatric/Behavioral: Negative for agitation and behavioral problems.       Physical Exam     Initial Vitals [22 0753]   BP Pulse Resp Temp SpO2   (!) 147/63 (!) 125 20 99.6 °F (37.6 °C) 97 %      MAP       --         Physical Exam    Nursing note and vitals reviewed.  Constitutional: He appears  well-developed and well-nourished.   HENT:   Head: Normocephalic and atraumatic.   Right Ear: External ear normal.   Left Ear: External ear normal.   Nose: Nose normal.   Mouth/Throat: Oropharynx is clear and moist.   Eyes: Conjunctivae and EOM are normal. Pupils are equal, round, and reactive to light. No scleral icterus.   Neck: Hepatojugular reflux and JVD present.   Cardiovascular: Regular rhythm, S1 normal and S2 normal. Tachycardia present.  Exam reveals gallop and S3. Exam reveals no friction rub.    No murmur heard.  Pulses:       Radial pulses are 2+ on the right side and 2+ on the left side.        Popliteal pulses are 1+ on the right side and 1+ on the left side.   Pulmonary/Chest: No stridor. No respiratory distress. He has no wheezes. He has no rhonchi. He has rales. He exhibits no tenderness.   Abdominal: Abdomen is soft. Bowel sounds are normal. He exhibits distension. There is no abdominal tenderness. There is no rebound and no guarding.   Musculoskeletal:         General: Tenderness present. Normal range of motion.      Right lower leg: Tenderness present. 1+ Pitting Edema present.      Left lower le+ Pitting Edema present.      Comments: Sacral edema present     Lymphadenopathy:     He has no cervical adenopathy.   Neurological: He is alert and oriented to person, place, and time. He has normal strength and normal reflexes. No cranial nerve deficit. GCS score is 15. GCS eye subscore is 4. GCS verbal subscore is 5. GCS motor subscore is 6.   Skin: Skin is warm. Capillary refill takes less than 2 seconds. No erythema.   Psychiatric: He has a normal mood and affect. His behavior is normal. Judgment and thought content normal.         ED Course   Procedures  Labs Reviewed   CBC W/ AUTO DIFFERENTIAL - Abnormal; Notable for the following components:       Result Value    RBC 3.57 (*)     Hemoglobin 11.0 (*)     Hematocrit 32.9 (*)     RDW 15.1 (*)     All other components within normal limits    COMPREHENSIVE METABOLIC PANEL - Abnormal; Notable for the following components:    Sodium 135 (*)     CO2 18 (*)     Glucose 169 (*)     Creatinine 1.8 (*)     Total Bilirubin 1.1 (*)     eGFR if  54.4 (*)     eGFR if non  47.0 (*)     All other components within normal limits   TROPONIN I - Abnormal; Notable for the following components:    Troponin I 0.277 (*)     All other components within normal limits   B-TYPE NATRIURETIC PEPTIDE - Abnormal; Notable for the following components:    BNP 1,701 (*)     All other components within normal limits   TROPONIN I - Abnormal; Notable for the following components:    Troponin I 0.249 (*)     All other components within normal limits   SARS-COV-2 RDRP GENE   POCT GLUCOSE MONITORING CONTINUOUS        ECG Results          EKG 12-lead (Final result)  Result time 06/13/22 09:59:37    Final result by Interface, Lab In [facility] (06/13/22 09:59:37)                 Narrative:    Test Reason : R07.89,    Vent. Rate : 123 BPM     Atrial Rate : 123 BPM     P-R Int : 122 ms          QRS Dur : 088 ms      QT Int : 340 ms       P-R-T Axes : 060 -36 076 degrees     QTc Int : 486 ms    Sinus tachycardia  Biatrial enlargement  Left anterior fascicular block  Abnormal ECG  When compared with ECG of 24-MAY-2022 17:41,  Questionable change in The axis  Confirmed by Michael Ngo MD ([id number]) on 6/13/2022 9:59:29 AM    Referred By: System System           Confirmed By:Michael Ngo MD                            Imaging Results          X-Ray Chest AP Portable (Final result)  Result time 06/13/22 09:30:14    Final result by Casey Ferris MD (06/13/22 09:30:14)                 Impression:      Cardiomegaly and minimally increased pulmonary vascularity.  The heart size is larger and the pulmonary vascularity slightly more prominent than was the case on 05/24/2022.  No other detrimental interval change in the appearance of the chest since that time is  appreciated..      Electronically signed by: Casey Ferris MD  Date:    06/13/2022  Time:    09:30             Narrative:    EXAMINATION:  XR CHEST AP PORTABLE    COMPARISON:  Comparison is made to 05/24/2022.    FINDINGS:  Cardiac pacing device again observed, as is a vascular catheter entering from the right arm, its tip in the superior vena cava.  The heart is enlarged, and appears larger than was the case on the examination referenced above.  Pulmonary vascularity is also slightly more prominent than at that time.  The lung zones remain essentially clear, and are free of significant airspace consolidation or volume loss.  No pleural fluid.  No pneumothorax.                                 Medications   furosemide (LASIX) 200 mg in dextrose 5 % 100 mL continuous infusion (conc: 2 mg/mL) (20 mg/hr Intravenous New Bag 6/13/22 1351)   potassium chloride SA CR tablet 40 mEq (has no administration in time range)   glucose chewable tablet 16 g (has no administration in time range)   glucose chewable tablet 24 g (has no administration in time range)   glucagon (human recombinant) injection 1 mg (has no administration in time range)   dextrose 10% bolus 125 mL (has no administration in time range)   dextrose 10% bolus 250 mL (has no administration in time range)   aspirin chewable tablet 81 mg (81 mg Oral Given 6/13/22 1348)   busPIRone split tablet 7.5 mg (has no administration in time range)   milrinone 20mg in D5W 100 mL infusion (0.375 mcg/kg/min × 93.7 kg (Order-Specific) Intravenous New Bag 6/13/22 1341)   mirtazapine tablet 15 mg (has no administration in time range)   pantoprazole EC tablet 40 mg (40 mg Oral Given 6/13/22 1348)   promethazine tablet 12.5 mg (has no administration in time range)   sucralfate tablet 1 g (has no administration in time range)   sodium chloride 0.9% flush 10 mL (has no administration in time range)   enoxaparin injection 40 mg (40 mg Subcutaneous Given 6/13/22 1603)   acetaminophen  tablet 650 mg (650 mg Oral Given 6/13/22 1603)   magnesium oxide tablet 400 mg (has no administration in time range)   furosemide injection 80 mg (80 mg Intravenous Given 6/13/22 0901)   EScitalopram oxalate tablet 5 mg (5 mg Oral Given 6/13/22 1202)   potassium chloride SA CR tablet 60 mEq (60 mEq Oral Given 6/13/22 1348)   magnesium oxide tablet 800 mg (800 mg Oral Given 6/13/22 1748)     Medical Decision Making:   Initial Assessment:   Pt endorsing one week of peripheral edema (most prominent in the abdomen and lower back) and stable/unstable anginal symptoms Likely associated with CHF exacerbation. Pt reported 6lb weight increase over the past 2 weeks.  Differential Diagnosis:   Electrolyte abnormality: negative for acute electrolyte abnormalities concerning for presentation  Infection: Pt tachycardic, but no additional symptoms suggestive of septic workup. CXR negative for acute findings  Clinical Tests:   Lab Tests: Ordered and Reviewed  Radiological Study: Ordered and Reviewed  Medical Tests: Ordered and Reviewed  ED Management:  36yo gentleman with Hx of non-ischemic HFrEF(15%) currently on heart transplant list who presenting with increased sacral/abdominal edema and CP being worked up for CHF exacerbation.    Unstable angina in setting of CHF exacerbation:  Troponin elevated on admission (.27) & trended q4hr. Troponin value stable at .249. BNP elevated at 1,700. ECG significant for sinus tachycardia and bi-atrial enlargement. Pt diuresed on IV Lasix 80mg. Cardiology consulted for unstable angina in setting acute CHF exacerbation. Pt admitted to Cardiology inpatient care.             ED Course as of 06/13/22 1825 Mon Jun 13, 2022   1104 Spoke with cardiology  [DC]      ED Course User Index  [DC] Vladimir Araujo Jr., MD             Clinical Impression:   Final diagnoses:  [R07.89] Chest discomfort  [R07.9] Chest pain  [I20.0] Unstable angina pectoris (Primary)  [R06.02] SOB (shortness of  breath)  [M79.89] Left upper extremity swelling  [R00.0] Tachycardia  [I50.9] Congestive heart failure, unspecified HF chronicity, unspecified heart failure type  [I50.9] Acute decompensated heart failure          ED Disposition Condition    Admit               Jordan Schmidt MD  Resident  06/13/22 0555

## 2022-06-13 NOTE — PROGRESS NOTES
Notified LUCÍA Cain of CVP 20 SVO2 37. Pt awake alert and oriented X 4, not in distress. No complaints from pt. No new orders given at this time. WCTM

## 2022-06-13 NOTE — ED NOTES
"Pt is AAOx4. RR is even, unlabored, and spontaneous. Skin is warm, dry and intact. Pt reports "having lots of fluid" x1 week despite taking 80 mg x2 of Lasix a day. Pt reports his MD recommended taking an extra 80 mg/day to see if it would help but states there has been no relief. Pt reports upper abdominal pain and bladder fullness. Pt states he has had difficulty urinating though he "feels pressure in his bladder." Pt states that he did not eat for 3 days last week d/t abdominal + bladder fullness. Pt placed on continuous cardiac monitor, BP cuff, and pulse ox. Bed low and locked; side rails up x2; call light within reach. Family member at bedside. Will continue to monitor.    Patient identifiers for Kevan Queen 37 y.o. male checked and correct.  Chief Complaint   Patient presents with    Chest Pain     Chf on primacor drip,      Past Medical History:   Diagnosis Date    Arthritis     Cardiomyopathy     CHF (congestive heart failure) 10/01/2020    Diabetes mellitus     Dilated cardiomyopathy 10/26/2020    Drug abuse 10/2020    Hyperosmolar hyperglycemic state (HHS) 5/25/2022    ICD (implantable cardioverter-defibrillator) in place 10/26/2020    Renal disorder      Allergies reported:   Review of patient's allergies indicates:   Allergen Reactions    Bumex [bumetanide] Hives    Lactose Diarrhea     Other reaction(s): Abdominal distension, gaseous    Torsemide Hives     "

## 2022-06-13 NOTE — SUBJECTIVE & OBJECTIVE
Past Medical History:   Diagnosis Date    Arthritis     Cardiomyopathy     CHF (congestive heart failure) 10/01/2020    Diabetes mellitus     Dilated cardiomyopathy 10/26/2020    Drug abuse 10/2020    Hyperosmolar hyperglycemic state (HHS) 5/25/2022    ICD (implantable cardioverter-defibrillator) in place 10/26/2020    Renal disorder        Past Surgical History:   Procedure Laterality Date    CARDIAC DEFIBRILLATOR PLACEMENT      RIGHT HEART CATHETERIZATION Right 4/8/2022    Procedure: INSERTION, CATHETER, RIGHT HEART;  Surgeon: Luca Lopez Jr., MD;  Location: [facility] CATH LAB;  Service: Cardiology;  Laterality: Right;    RIGHT HEART CATHETERIZATION Right 4/19/2022    Procedure: INSERTION, CATHETER, RIGHT HEART;  Surgeon: Josh Pulido MD;  Location: [facility] CATH LAB;  Service: Cardiology;  Laterality: Right;       Review of patient's allergies indicates:   Allergen Reactions    Bumex [bumetanide] Hives    Lactose Diarrhea     Other reaction(s): Abdominal distension, gaseous    Torsemide Hives       Current Facility-Administered Medications   Medication    aspirin chewable tablet 81 mg    busPIRone split tablet 7.5 mg    dextrose 10% bolus 125 mL    dextrose 10% bolus 250 mL    enoxaparin injection 40 mg    furosemide (LASIX) 200 mg in dextrose 5 % 100 mL continuous infusion (conc: 2 mg/mL)    glucagon (human recombinant) injection 1 mg    glucose chewable tablet 16 g    glucose chewable tablet 24 g    milrinone 20mg in D5W 100 mL infusion    mirtazapine tablet 15 mg    pantoprazole EC tablet 40 mg    potassium chloride SA CR tablet 40 mEq    potassium chloride SA CR tablet 60 mEq    promethazine tablet 12.5 mg    sodium chloride 0.9% flush 10 mL    sucralfate tablet 1 g     Family History       Problem Relation (Age of Onset)    Diverticulosis Brother    Heart attack Maternal Grandmother, Maternal Grandfather    Heart failure Father          Tobacco Use    Smoking status: Former Smoker     Packs/day:  0.50     Years: 16.00     Pack years: 8.00     Start date: 10/1/2004     Quit date: 2021     Years since quittin.1    Smokeless tobacco: Never Used   Substance and Sexual Activity    Alcohol use: Not Currently    Drug use: Not Currently     Types: Marijuana, MDMA (Ecstacy)    Sexual activity: Yes     Partners: Female     Birth control/protection: None     Review of Systems   Constitutional:  Positive for activity change, appetite change, fatigue and unexpected weight change.   HENT: Negative.     Eyes: Negative.    Respiratory:  Positive for shortness of breath.    Cardiovascular:  Positive for leg swelling.   Gastrointestinal:  Positive for abdominal distention.   Endocrine: Negative.    Genitourinary: Negative.    Musculoskeletal:  Positive for arthralgias.   Skin: Negative.    Allergic/Immunologic: Negative.    Neurological: Negative.    Hematological: Negative.    Psychiatric/Behavioral: Negative.     Objective:     Vital Signs (Most Recent):  Temp: 98.4 °F (36.9 °C) (22 1245)  Pulse: 92 (22 1245)  Resp: 15 (22 1245)  BP: 110/81 (22 1245)  SpO2: (!) 93 % (22 1245)   Vital Signs (24h Range):  Temp:  [98.4 °F (36.9 °C)-99.6 °F (37.6 °C)] 98.4 °F (36.9 °C)  Pulse:  [] 92  Resp:  [15-20] 15  SpO2:  [93 %-98 %] 93 %  BP: (101-147)/(62-81) 110/81     Patient Vitals for the past 72 hrs (Last 3 readings):   Weight   22 0753 101.2 kg (223 lb)     Body mass index is 27.87 kg/m².      Intake/Output Summary (Last 24 hours) at 2022 1315  Last data filed at 2022 1048  Gross per 24 hour   Intake --   Output 825 ml   Net -825 ml       Physical Exam  Constitutional:       Appearance: Normal appearance.   HENT:      Head: Normocephalic and atraumatic.   Eyes:      Conjunctiva/sclera: Conjunctivae normal.      Pupils: Pupils are equal, round, and reactive to light.   Neck:      Comments: JVP difficult to see but appears to be a jaw  Cardiovascular:      Rate and Rhythm:  Regular rhythm. Tachycardia present.      Comments: +S3  Pulmonary:      Breath sounds: Normal breath sounds.      Comments: SOB at rest  Abdominal:      General: Bowel sounds are normal. There is distension.      Comments: RUQ fullness   Musculoskeletal:         General: Normal range of motion.      Cervical back: Normal range of motion and neck supple.      Comments: 1+ TREVER   Skin:     General: Skin is warm and dry.      Capillary Refill: Capillary refill takes 2 to 3 seconds.   Neurological:      General: No focal deficit present.      Mental Status: He is alert and oriented to person, place, and time.   Psychiatric:         Mood and Affect: Mood normal.         Behavior: Behavior normal.         Thought Content: Thought content normal.         Judgment: Judgment normal.       Significant Labs:  CBC:  Recent Labs   Lab 06/13/22  0901   WBC 8.67   RBC 3.57*   HGB 11.0*   HCT 32.9*      MCV 92   MCH 30.8   MCHC 33.4     BNP:  Recent Labs   Lab 06/13/22  0901   BNP 1,701*     CMP:  Recent Labs   Lab 06/13/22  0901   *   CALCIUM 9.1   ALBUMIN 3.6   PROT 7.2   *   K 3.5   CO2 18*      BUN 19   CREATININE 1.8*   ALKPHOS 81   ALT 19   AST 33   BILITOT 1.1*      Coagulation:   No results for input(s): PT, INR, APTT in the last 168 hours.  LDH:  No results for input(s): LDH in the last 72 hours.  Microbiology:  Microbiology Results (last 7 days)       ** No results found for the last 168 hours. **            I have reviewed all pertinent labs within the past 24 hours.    Diagnostic Results:  I have reviewed and interpreted all pertinent imaging results/findings within the past 24 hours.

## 2022-06-13 NOTE — ASSESSMENT & PLAN NOTE
-[facility]  -Approved for LVAD at [facility] on 4/2/22. Was not evaluated for OHTx as he quit smoking in April 2022  -TTE done 4/19/22: LVEDD 7.3, LVEF 15%, diastolic dysfunction, RV severely dilated and RV function mod depressed, mod MR, mild TR, PAS > 31 and IVC 3. Will repeat TTE  -RHC done 4/19/22 on Milrinone 0.5 mcg/kg/min: RA 11, PA 50/27 (35), W 27, CO/CI 3.7/1.7, PVR 2.2 and SVR 1535   -GDMT: Entresto and Aldactone on hold since admit 5/24-5/27 2/2 elevated sCr  -Baseline dose of Lasix is 80 mg bid. He has taken 240 mg qd for the past 3 days with no improvement in SOB at rest  -Reports dry weight is 217#, and that he has gained 6# on his home scale  -Lasix 80 mg IVP given in the ED with plan to start Lasix 20 mg/hr  -Consider repeating RHC on Milrinone 0.375 mcg/kg/min once euvolemic

## 2022-06-13 NOTE — ED NOTES
Pt awake and alert; resting quietly on stretcher.  Pt remains on continuous cardiac and pulse ox monitoring with non-invasive blood pressure to cycle every 30 minutes.  Pt denies pain at this time; no acute distress or discomfort reported or observed. Pt provided w/ bedside urinal. Bed locked in lowest position; side rails up and locked x 2; call light, bedside table, and personal belongings within reach. Pt denies needs or complaints at this time; will continue to monitor.

## 2022-06-13 NOTE — PLAN OF CARE
Plan of care discussed with patient. Patient is free of fall/trauma/injury. Denies CP, SOB, or pain/discomfort. Vitals WNL, pt awake alert and oriented X 4. Ambulated in room today. Lasix gtt and milrinone gtt maintained per orders. All questions addressed. Will continue to monitor. Pt NOT icu pt, only on this unit due to construction on stepdown CSU unit.

## 2022-06-13 NOTE — ASSESSMENT & PLAN NOTE
-Admitted 5/24-5/27 with hyperglycemia hyperosmolar syndrome  -Hgb A1C 9.2 on 5/20/22  -Glucose per labs today 169  -SSI for now

## 2022-06-13 NOTE — TELEPHONE ENCOUNTER
----- Message from Melanie Tam MA sent at 6/13/2022  1:07 PM CDT -----  The patient mother Malou need to talk to you about her son she said he is in the hospital please call [phone]. Thank you.

## 2022-06-13 NOTE — Clinical Note
The left neck was prepped. The site was prepped with ChloraPrep. The patient was draped. The patient was positioned supine.

## 2022-06-13 NOTE — PROGRESS NOTES
[facility] - Cardiac Intensive Care  Heart Transplant  Admit H&P    Patient Name: Kevan Queen  MRN: [id number]  Admission Date: 6/13/2022  Hospital Length of Stay: 0 days  Attending Physician: Natalya Villatoro MD  Primary Care Provider: ORALIA Cline  Principal Problem:Acute on chronic combined systolic and diastolic heart failure, NYHA class 4    Subjective:     Past Medical History:   Diagnosis Date    Arthritis     Cardiomyopathy     CHF (congestive heart failure) 10/01/2020    Diabetes mellitus     Dilated cardiomyopathy 10/26/2020    Drug abuse 10/2020    Hyperosmolar hyperglycemic state (HHS) 5/25/2022    ICD (implantable cardioverter-defibrillator) in place 10/26/2020    Renal disorder        Past Surgical History:   Procedure Laterality Date    CARDIAC DEFIBRILLATOR PLACEMENT      RIGHT HEART CATHETERIZATION Right 4/8/2022    Procedure: INSERTION, CATHETER, RIGHT HEART;  Surgeon: Luca Lopez Jr., MD;  Location: [facility] CATH LAB;  Service: Cardiology;  Laterality: Right;    RIGHT HEART CATHETERIZATION Right 4/19/2022    Procedure: INSERTION, CATHETER, RIGHT HEART;  Surgeon: Josh Pulido MD;  Location: [facility] CATH LAB;  Service: Cardiology;  Laterality: Right;       Review of patient's allergies indicates:   Allergen Reactions    Bumex [bumetanide] Hives    Lactose Diarrhea     Other reaction(s): Abdominal distension, gaseous    Torsemide Hives       Current Facility-Administered Medications   Medication    aspirin chewable tablet 81 mg    busPIRone split tablet 7.5 mg    dextrose 10% bolus 125 mL    dextrose 10% bolus 250 mL    enoxaparin injection 40 mg    furosemide (LASIX) 200 mg in dextrose 5 % 100 mL continuous infusion (conc: 2 mg/mL)    glucagon (human recombinant) injection 1 mg    glucose chewable tablet 16 g    glucose chewable tablet 24 g    milrinone 20mg in D5W 100 mL infusion    mirtazapine tablet 15 mg    pantoprazole EC tablet 40 mg    potassium  chloride SA CR tablet 40 mEq    potassium chloride SA CR tablet 60 mEq    promethazine tablet 12.5 mg    sodium chloride 0.9% flush 10 mL    sucralfate tablet 1 g     Family History       Problem Relation (Age of Onset)    Diverticulosis Brother    Heart attack Maternal Grandmother, Maternal Grandfather    Heart failure Father          Tobacco Use    Smoking status: Former Smoker     Packs/day: 0.50     Years: 16.00     Pack years: 8.00     Start date: 10/1/2004     Quit date: 2021     Years since quittin.1    Smokeless tobacco: Never Used   Substance and Sexual Activity    Alcohol use: Not Currently    Drug use: Not Currently     Types: Marijuana, MDMA (Ecstacy)    Sexual activity: Yes     Partners: Female     Birth control/protection: None     Review of Systems   Constitutional:  Positive for activity change, appetite change, fatigue and unexpected weight change.   HENT: Negative.     Eyes: Negative.    Respiratory:  Positive for shortness of breath.    Cardiovascular:  Positive for leg swelling.   Gastrointestinal:  Positive for abdominal distention.   Endocrine: Negative.    Genitourinary: Negative.    Musculoskeletal:  Positive for arthralgias.   Skin: Negative.    Allergic/Immunologic: Negative.    Neurological: Negative.    Hematological: Negative.    Psychiatric/Behavioral: Negative.     Objective:     Vital Signs (Most Recent):  Temp: 98.4 °F (36.9 °C) (22 1245)  Pulse: 92 (22 1245)  Resp: 15 (22 1245)  BP: 110/81 (22 1245)  SpO2: (!) 93 % (22 1245)   Vital Signs (24h Range):  Temp:  [98.4 °F (36.9 °C)-99.6 °F (37.6 °C)] 98.4 °F (36.9 °C)  Pulse:  [] 92  Resp:  [15-20] 15  SpO2:  [93 %-98 %] 93 %  BP: (101-147)/(62-81) 110/81     Patient Vitals for the past 72 hrs (Last 3 readings):   Weight   22 0753 101.2 kg (223 lb)     Body mass index is 27.87 kg/m².      Intake/Output Summary (Last 24 hours) at 2022 1315  Last data filed at 2022  1048  Gross per 24 hour   Intake --   Output 825 ml   Net -825 ml       Physical Exam  Constitutional:       Appearance: Normal appearance.   HENT:      Head: Normocephalic and atraumatic.   Eyes:      Conjunctiva/sclera: Conjunctivae normal.      Pupils: Pupils are equal, round, and reactive to light.   Neck:      Comments: JVP difficult to see but appears to be a jaw  Cardiovascular:      Rate and Rhythm: Regular rhythm. Tachycardia present.      Comments: +S3  Pulmonary:      Breath sounds: Normal breath sounds.      Comments: SOB at rest  Abdominal:      General: Bowel sounds are normal. There is distension.      Comments: RUQ fullness   Musculoskeletal:         General: Normal range of motion.      Cervical back: Normal range of motion and neck supple.      Comments: 1+ TREVER   Skin:     General: Skin is warm and dry.      Capillary Refill: Capillary refill takes 2 to 3 seconds.   Neurological:      General: No focal deficit present.      Mental Status: He is alert and oriented to person, place, and time.   Psychiatric:         Mood and Affect: Mood normal.         Behavior: Behavior normal.         Thought Content: Thought content normal.         Judgment: Judgment normal.       Significant Labs:  CBC:  Recent Labs   Lab 06/13/22  0901   WBC 8.67   RBC 3.57*   HGB 11.0*   HCT 32.9*      MCV 92   MCH 30.8   MCHC 33.4     BNP:  Recent Labs   Lab 06/13/22  0901   BNP 1,701*     CMP:  Recent Labs   Lab 06/13/22  0901   *   CALCIUM 9.1   ALBUMIN 3.6   PROT 7.2   *   K 3.5   CO2 18*      BUN 19   CREATININE 1.8*   ALKPHOS 81   ALT 19   AST 33   BILITOT 1.1*      Coagulation:   No results for input(s): PT, INR, APTT in the last 168 hours.  LDH:  No results for input(s): LDH in the last 72 hours.  Microbiology:  Microbiology Results (last 7 days)       ** No results found for the last 168 hours. **            I have reviewed all pertinent labs within the past 24 hours.    Diagnostic Results:  I  "have reviewed and interpreted all pertinent imaging results/findings within the past 24 hours.    Assessment and Plan:     36 yo male with NIDCM with BiV systolic heart failure, on home Milrinone at 0.375 mcg/kg/min, presented at [facility] 4/2/22 ,was not evaluated for OHT as he has recently quit smoking in April 2022 but was approved for VAD with plan to begin OHT evaluation in upcoming months if Mr Queen is stable and suitable for OHT eval (blood group A), 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 Went to ED at [facility] 6/10 but left after waiting 4 hours. Had clinic appointment with us today, but arrived to [facility] early this morning and decided to go to the ED instead. Baseline Lasix dose is 80 mg bid. Reports taking 240 mg qd past 3 days with no improvement. BNP is 1701, up from 898 on 6/2 and 49 on 5/24. sCr is 1.8 with baseline ~ 1.5-1.7. sPO2 on RA is 93%. Wife at bedside    He has been given Lasix 80 mg IVP in the ED with plan to start Lasix gtt at 20 mg/hr           * Acute on chronic combined systolic and diastolic heart failure, NYHA class 4  -NIDCM  -Approved for LVAD at [facility] on 4/2/22. Was not evaluated for OHTx as he quit smoking in April 2022  -TTE done 4/19/22: LVEDD 7.3, LVEF 15%, diastolic dysfunction, RV severely dilated and RV function mod depressed, mod MR, mild TR, PAS > 31 and IVC 3. Will repeat TTE  -RHC done 4/19/22 on Milrinone 0.5 mcg/kg/min: RA 11, PA 50/27 (35), W 27, CO/CI 3.7/1.7, PVR 2.2 and SVR 1535   -GDMT: Entresto and Aldactone on hold since admit 5/24-5/27 2/2 elevated sCr  -Baseline dose of Lasix is 80 mg bid. He has taken 240 mg qd for the past 3 " days with no improvement in SOB at rest  -Reports dry weight is 217#, and that he has gained 6# on his home scale  -Lasix 80 mg IVP given in the ED with plan to start Lasix 20 mg/hr  -Consider repeating RHC on Milrinone 0.375 mcg/kg/min once euvolemic      Uncontrolled diabetes mellitus  -Admitted 5/24-5/27 with hyperglycemia hyperosmolar syndrome  -Hgb A1C 9.2 on 5/20/22  -Glucose per labs today 169  -SSI for now    CKD (chronic kidney disease) stage 3, GFR 30-59 ml/min  -Admit sCr 1.8, baseline ~ 1.5-1.7  -Monitor [place] [id number]  Heart Transplant  [facility] - Cardiac Intensive Care

## 2022-06-13 NOTE — ED NOTES
Pt awake and alert; resting quietly on stretcher.  Pt remains on continuous cardiac and pulse ox monitoring with non-invasive blood pressure to cycle every 30 minutes.  Pt denies pain at this time; no acute distress or discomfort reported or observed . Bed locked in lowest position; side rails up and locked x 2; call light, bedside table, and personal belongings within reach.  Plan of care discussed. Pt denies needs or complaints at this time; will continue to monitor.

## 2022-06-13 NOTE — SUBJECTIVE & OBJECTIVE
Past Medical History:   Diagnosis Date    Arthritis     Cardiomyopathy     CHF (congestive heart failure) 10/01/2020    Diabetes mellitus     Dilated cardiomyopathy 10/26/2020    Drug abuse 10/2020    Hyperosmolar hyperglycemic state (HHS) 5/25/2022    ICD (implantable cardioverter-defibrillator) in place 10/26/2020    Renal disorder        Past Surgical History:   Procedure Laterality Date    CARDIAC DEFIBRILLATOR PLACEMENT      RIGHT HEART CATHETERIZATION Right 4/8/2022    Procedure: INSERTION, CATHETER, RIGHT HEART;  Surgeon: Luca Lopez Jr., MD;  Location: [facility] CATH LAB;  Service: Cardiology;  Laterality: Right;    RIGHT HEART CATHETERIZATION Right 4/19/2022    Procedure: INSERTION, CATHETER, RIGHT HEART;  Surgeon: Josh Pulido MD;  Location: [facility] CATH LAB;  Service: Cardiology;  Laterality: Right;       Review of patient's allergies indicates:   Allergen Reactions    Bumex [bumetanide] Hives    Lactose Diarrhea     Other reaction(s): Abdominal distension, gaseous    Torsemide Hives       Current Facility-Administered Medications   Medication    aspirin chewable tablet 81 mg    busPIRone split tablet 7.5 mg    dextrose 10% bolus 125 mL    dextrose 10% bolus 250 mL    enoxaparin injection 40 mg    furosemide (LASIX) 200 mg in dextrose 5 % 100 mL continuous infusion (conc: 2 mg/mL)    glucagon (human recombinant) injection 1 mg    glucose chewable tablet 16 g    glucose chewable tablet 24 g    milrinone 20mg in D5W 100 mL infusion    mirtazapine tablet 15 mg    pantoprazole EC tablet 40 mg    potassium chloride SA CR tablet 40 mEq    promethazine tablet 12.5 mg    sodium chloride 0.9% flush 10 mL    sucralfate tablet 1 g     Family History       Problem Relation (Age of Onset)    Diverticulosis Brother    Heart attack Maternal Grandmother, Maternal Grandfather    Heart failure Father          Tobacco Use    Smoking status: Former Smoker     Packs/day: 0.50     Years: 16.00     Pack years: 8.00      Start date: 10/1/2004     Quit date: 2021     Years since quittin.1    Smokeless tobacco: Never Used   Substance and Sexual Activity    Alcohol use: Not Currently    Drug use: Not Currently     Types: Marijuana, MDMA (Ecstacy)    Sexual activity: Yes     Partners: Female     Birth control/protection: None     Review of Systems   Constitutional:  Positive for activity change, fatigue and unexpected weight change.   HENT: Negative.     Eyes: Negative.    Respiratory:  Positive for shortness of breath.    Cardiovascular:  Positive for leg swelling.   Gastrointestinal:  Positive for abdominal distention.   Endocrine: Negative.    Genitourinary: Negative.    Musculoskeletal:  Positive for arthralgias.   Skin: Negative.    Allergic/Immunologic: Negative.    Neurological: Negative.    Hematological: Negative.    Psychiatric/Behavioral: Negative.     Objective:     Vital Signs (Most Recent):  Temp: 98.4 °F (36.9 °C) (22 1409)  Pulse: 110 (22 1409)  Resp: 16 (22 1409)  BP: 110/81 (22 1409)  SpO2: (!) 94 % (22 1409)   Vital Signs (24h Range):  Temp:  [98.4 °F (36.9 °C)-99.6 °F (37.6 °C)] 98.4 °F (36.9 °C)  Pulse:  [] 110  Resp:  [15-20] 16  SpO2:  [93 %-98 %] 94 %  BP: (101-147)/(62-81) 110/81     Patient Vitals for the past 72 hrs (Last 3 readings):   Weight   22 1409 102.4 kg (225 lb 12 oz)   22 0753 101.2 kg (223 lb)     Body mass index is 28.22 kg/m².      Intake/Output Summary (Last 24 hours) at 2022 1426  Last data filed at 2022 1048  Gross per 24 hour   Intake --   Output 825 ml   Net -825 ml       Physical Exam  Constitutional:       Appearance: Normal appearance.   HENT:      Head: Normocephalic and atraumatic.   Eyes:      Conjunctiva/sclera: Conjunctivae normal.      Pupils: Pupils are equal, round, and reactive to light.   Neck:      Comments: JVP difficult to see but appears to be at jaw  Cardiovascular:      Rate and Rhythm: Regular rhythm.  Tachycardia present.      Comments: +S3  Pulmonary:      Breath sounds: Normal breath sounds.      Comments: SOB at rest  Abdominal:      General: Bowel sounds are normal. There is distension.   Musculoskeletal:         General: Normal range of motion.      Cervical back: Neck supple.      Comments: 1+ TREVER   Skin:     General: Skin is warm and dry.      Capillary Refill: Capillary refill takes 2 to 3 seconds.   Neurological:      General: No focal deficit present.      Mental Status: He is alert and oriented to person, place, and time.   Psychiatric:         Mood and Affect: Mood normal.         Behavior: Behavior normal.         Thought Content: Thought content normal.         Judgment: Judgment normal.       Significant Labs:  CBC:  Recent Labs   Lab 06/13/22  0901   WBC 8.67   RBC 3.57*   HGB 11.0*   HCT 32.9*      MCV 92   MCH 30.8   MCHC 33.4     BNP:  Recent Labs   Lab 06/13/22  0901   BNP 1,701*     CMP:  Recent Labs   Lab 06/13/22  0901   *   CALCIUM 9.1   ALBUMIN 3.6   PROT 7.2   *   K 3.5   CO2 18*      BUN 19   CREATININE 1.8*   ALKPHOS 81   ALT 19   AST 33   BILITOT 1.1*      Coagulation:   No results for input(s): PT, INR, APTT in the last 168 hours.  LDH:  No results for input(s): LDH in the last 72 hours.  Microbiology:  Microbiology Results (last 7 days)       ** No results found for the last 168 hours. **            I have reviewed all pertinent labs within the past 24 hours.    Diagnostic Results:  I have reviewed and interpreted all pertinent imaging results/findings within the past 24 hours.

## 2022-06-14 ENCOUNTER — DOCUMENTATION ONLY (OUTPATIENT)
Dept: CARDIOLOGY | Facility: HOSPITAL | Age: 37
End: 2022-06-14
Payer: MEDICAID

## 2022-06-14 LAB
ALBUMIN SERPL BCP-MCNC: 3.5 G/DL (ref 3.5–5.2)
ALLENS TEST: ABNORMAL
ALP SERPL-CCNC: 79 U/L (ref 55–135)
ALT SERPL W/O P-5'-P-CCNC: 24 U/L (ref 10–44)
ANION GAP SERPL CALC-SCNC: 11 MMOL/L (ref 8–16)
ANION GAP SERPL CALC-SCNC: 14 MMOL/L (ref 8–16)
ANION GAP SERPL CALC-SCNC: 14 MMOL/L (ref 8–16)
APTT BLDCRRT: 29.1 SEC (ref 21–32)
APTT BLDCRRT: 30.8 SEC (ref 21–32)
ASCENDING AORTA: 2.4 CM
AST SERPL-CCNC: 31 U/L (ref 10–40)
AV INDEX (PROSTH): 0.49
AV MEAN GRADIENT: 2 MMHG
AV PEAK GRADIENT: 3 MMHG
AV VALVE AREA: 1.84 CM2
AV VELOCITY RATIO: 0.52
BASOPHILS # BLD AUTO: 0.02 K/UL (ref 0–0.2)
BASOPHILS NFR BLD: 0.3 % (ref 0–1.9)
BILIRUB SERPL-MCNC: 1.1 MG/DL (ref 0.1–1)
BSA FOR ECHO PROCEDURE: 2.31 M2
BUN SERPL-MCNC: 15 MG/DL (ref 6–20)
BUN SERPL-MCNC: 17 MG/DL (ref 6–20)
BUN SERPL-MCNC: 17 MG/DL (ref 6–20)
CALCIUM SERPL-MCNC: 8.4 MG/DL (ref 8.7–10.5)
CALCIUM SERPL-MCNC: 8.6 MG/DL (ref 8.7–10.5)
CALCIUM SERPL-MCNC: 8.6 MG/DL (ref 8.7–10.5)
CHLORIDE SERPL-SCNC: 94 MMOL/L (ref 95–110)
CHLORIDE SERPL-SCNC: 97 MMOL/L (ref 95–110)
CHLORIDE SERPL-SCNC: 98 MMOL/L (ref 95–110)
CO2 SERPL-SCNC: 24 MMOL/L (ref 23–29)
CO2 SERPL-SCNC: 25 MMOL/L (ref 23–29)
CO2 SERPL-SCNC: 25 MMOL/L (ref 23–29)
CREAT SERPL-MCNC: 1.5 MG/DL (ref 0.5–1.4)
CREAT SERPL-MCNC: 1.6 MG/DL (ref 0.5–1.4)
CREAT SERPL-MCNC: 1.8 MG/DL (ref 0.5–1.4)
CV ECHO LV RWT: 0.2 CM
DELSYS: ABNORMAL
DIFFERENTIAL METHOD: ABNORMAL
DOP CALC AO PEAK VEL: 0.88 M/S
DOP CALC AO VTI: 12.68 CM
DOP CALC LVOT AREA: 3.8 CM2
DOP CALC LVOT DIAMETER: 2.19 CM
DOP CALC LVOT PEAK VEL: 0.46 M/S
DOP CALC LVOT STROKE VOLUME: 23.38 CM3
DOP CALCLVOT PEAK VEL VTI: 6.21 CM
E WAVE DECELERATION TIME: 133.04 MSEC
E/A RATIO: 2.49
E/E' RATIO: 13.86 M/S
ECHO LV POSTERIOR WALL: 0.77 CM (ref 0.6–1.1)
EJECTION FRACTION: 10 %
EOSINOPHIL # BLD AUTO: 0 K/UL (ref 0–0.5)
EOSINOPHIL NFR BLD: 0 % (ref 0–8)
ERYTHROCYTE [DISTWIDTH] IN BLOOD BY AUTOMATED COUNT: 15.1 % (ref 11.5–14.5)
EST. GFR  (AFRICAN AMERICAN): 54.4 ML/MIN/1.73 M^2
EST. GFR  (AFRICAN AMERICAN): >60 ML/MIN/1.73 M^2
EST. GFR  (AFRICAN AMERICAN): >60 ML/MIN/1.73 M^2
EST. GFR  (NON AFRICAN AMERICAN): 47 ML/MIN/1.73 M^2
EST. GFR  (NON AFRICAN AMERICAN): 54.2 ML/MIN/1.73 M^2
EST. GFR  (NON AFRICAN AMERICAN): 58.6 ML/MIN/1.73 M^2
FRACTIONAL SHORTENING: 4 % (ref 28–44)
GLUCOSE SERPL-MCNC: 220 MG/DL (ref 70–110)
GLUCOSE SERPL-MCNC: 234 MG/DL (ref 70–110)
GLUCOSE SERPL-MCNC: 274 MG/DL (ref 70–110)
HCO3 UR-SCNC: 27.3 MMOL/L (ref 24–28)
HCO3 UR-SCNC: 30.6 MMOL/L (ref 24–28)
HCO3 UR-SCNC: 30.9 MMOL/L (ref 24–28)
HCT VFR BLD AUTO: 30.4 % (ref 40–54)
HGB BLD-MCNC: 10.1 G/DL (ref 14–18)
IMM GRANULOCYTES # BLD AUTO: 0.01 K/UL (ref 0–0.04)
IMM GRANULOCYTES NFR BLD AUTO: 0.1 % (ref 0–0.5)
INTERVENTRICULAR SEPTUM: 0.91 CM (ref 0.6–1.1)
LA MAJOR: 6.99 CM
LA MINOR: 7.33 CM
LA WIDTH: 5.19 CM
LEFT ATRIUM SIZE: 3.94 CM
LEFT ATRIUM VOLUME INDEX MOD: 59.4 ML/M2
LEFT ATRIUM VOLUME INDEX: 54.3 ML/M2
LEFT ATRIUM VOLUME MOD: 136.09 CM3
LEFT ATRIUM VOLUME: 124.38 CM3
LEFT INTERNAL DIMENSION IN SYSTOLE: 7.29 CM (ref 2.1–4)
LEFT VENTRICLE DIASTOLIC VOLUME INDEX: 133.8 ML/M2
LEFT VENTRICLE DIASTOLIC VOLUME: 306.41 ML
LEFT VENTRICLE MASS INDEX: 131 G/M2
LEFT VENTRICLE SYSTOLIC VOLUME INDEX: 122.2 ML/M2
LEFT VENTRICLE SYSTOLIC VOLUME: 279.92 ML
LEFT VENTRICULAR INTERNAL DIMENSION IN DIASTOLE: 7.59 CM (ref 3.5–6)
LEFT VENTRICULAR MASS: 299.58 G
LV LATERAL E/E' RATIO: 9.7 M/S
LV SEPTAL E/E' RATIO: 24.25 M/S
LYMPHOCYTES # BLD AUTO: 1.8 K/UL (ref 1–4.8)
LYMPHOCYTES NFR BLD: 25.3 % (ref 18–48)
MAGNESIUM SERPL-MCNC: 2.1 MG/DL (ref 1.6–2.6)
MAGNESIUM SERPL-MCNC: 2.2 MG/DL (ref 1.6–2.6)
MAGNESIUM SERPL-MCNC: 2.5 MG/DL (ref 1.6–2.6)
MCH RBC QN AUTO: 30.6 PG (ref 27–31)
MCHC RBC AUTO-ENTMCNC: 33.2 G/DL (ref 32–36)
MCV RBC AUTO: 92 FL (ref 82–98)
METHEMOGLOBIN: 0 % (ref 0–3)
MONOCYTES # BLD AUTO: 0.6 K/UL (ref 0.3–1)
MONOCYTES NFR BLD: 7.8 % (ref 4–15)
MV PEAK A VEL: 0.39 M/S
MV PEAK E VEL: 0.97 M/S
MV STENOSIS PRESSURE HALF TIME: 38.58 MS
MV VALVE AREA P 1/2 METHOD: 5.7 CM2
NEUTROPHILS # BLD AUTO: 4.7 K/UL (ref 1.8–7.7)
NEUTROPHILS NFR BLD: 66.5 % (ref 38–73)
NRBC BLD-RTO: 0 /100 WBC
PCO2 BLDA: 47.3 MMHG (ref 35–45)
PCO2 BLDA: 53.7 MMHG (ref 35–45)
PCO2 BLDA: 57.7 MMHG (ref 35–45)
PH SMN: 7.34 [PH] (ref 7.35–7.45)
PH SMN: 7.36 [PH] (ref 7.35–7.45)
PH SMN: 7.37 [PH] (ref 7.35–7.45)
PISA MRMAX VEL: 0.04 M/S
PISA TR MAX VEL: 3.19 M/S
PLATELET # BLD AUTO: 228 K/UL (ref 150–450)
PMV BLD AUTO: 10.2 FL (ref 9.2–12.9)
PO2 BLDA: 27 MMHG (ref 40–60)
PO2 BLDA: 28 MMHG (ref 40–60)
PO2 BLDA: 33 MMHG (ref 40–60)
POC BE: 2 MMOL/L
POC BE: 5 MMOL/L
POC BE: 5 MMOL/L
POC SATURATED O2: 46 % (ref 95–100)
POC SATURATED O2: 51 % (ref 95–100)
POC SATURATED O2: 58 % (ref 95–100)
POC TCO2: 29 MMOL/L (ref 24–29)
POC TCO2: 32 MMOL/L (ref 24–29)
POC TCO2: 33 MMOL/L (ref 24–29)
POCT GLUCOSE: 211 MG/DL (ref 70–110)
POCT GLUCOSE: 248 MG/DL (ref 70–110)
POCT GLUCOSE: 294 MG/DL (ref 70–110)
POCT GLUCOSE: 301 MG/DL (ref 70–110)
POCT GLUCOSE: 307 MG/DL (ref 70–110)
POTASSIUM SERPL-SCNC: 3.6 MMOL/L (ref 3.5–5.1)
POTASSIUM SERPL-SCNC: 3.9 MMOL/L (ref 3.5–5.1)
POTASSIUM SERPL-SCNC: 3.9 MMOL/L (ref 3.5–5.1)
PROT SERPL-MCNC: 6.5 G/DL (ref 6–8.4)
RA MAJOR: 6.31 CM
RA PRESSURE: 15 MMHG
RA WIDTH: 4.75 CM
RBC # BLD AUTO: 3.3 M/UL (ref 4.6–6.2)
RIGHT VENTRICULAR END-DIASTOLIC DIMENSION: 5.49 CM
SAMPLE: ABNORMAL
SINUS: 2.82 CM
SITE: ABNORMAL
SODIUM SERPL-SCNC: 133 MMOL/L (ref 136–145)
SODIUM SERPL-SCNC: 133 MMOL/L (ref 136–145)
SODIUM SERPL-SCNC: 136 MMOL/L (ref 136–145)
STJ: 2.45 CM
TDI LATERAL: 0.1 M/S
TDI SEPTAL: 0.04 M/S
TDI: 0.07 M/S
TR MAX PG: 41 MMHG
TRICUSPID ANNULAR PLANE SYSTOLIC EXCURSION: 1.29 CM
TV REST PULMONARY ARTERY PRESSURE: 56 MMHG
VERBAL RESULT READBACK PERFORMED: YES
WBC # BLD AUTO: 7.08 K/UL (ref 3.9–12.7)

## 2022-06-14 PROCEDURE — 99291 CRITICAL CARE FIRST HOUR: CPT | Mod: NTX,,, | Performed by: NURSE PRACTITIONER

## 2022-06-14 PROCEDURE — 99291 PR CRITICAL CARE, E/M 30-74 MINUTES: ICD-10-PCS | Mod: NTX,,, | Performed by: NURSE PRACTITIONER

## 2022-06-14 PROCEDURE — 83735 ASSAY OF MAGNESIUM: CPT | Mod: 91,NTX | Performed by: INTERNAL MEDICINE

## 2022-06-14 PROCEDURE — 63600175 PHARM REV CODE 636 W HCPCS: Mod: NTX | Performed by: INTERNAL MEDICINE

## 2022-06-14 PROCEDURE — 85730 THROMBOPLASTIN TIME PARTIAL: CPT | Mod: 91,NTX | Performed by: INTERNAL MEDICINE

## 2022-06-14 PROCEDURE — 25000003 PHARM REV CODE 250: Mod: NTX | Performed by: INTERNAL MEDICINE

## 2022-06-14 PROCEDURE — 80048 BASIC METABOLIC PNL TOTAL CA: CPT | Mod: 91,NTX,XB | Performed by: INTERNAL MEDICINE

## 2022-06-14 PROCEDURE — 80048 BASIC METABOLIC PNL TOTAL CA: CPT | Mod: NTX,XB | Performed by: INTERNAL MEDICINE

## 2022-06-14 PROCEDURE — 83050 HGB METHEMOGLOBIN QUAN: CPT | Mod: NTX

## 2022-06-14 PROCEDURE — C1751 CATH, INF, PER/CENT/MIDLINE: HCPCS | Mod: NTX

## 2022-06-14 PROCEDURE — 27000202 HC IABP, ADD'L DAY: Mod: NTX

## 2022-06-14 PROCEDURE — 36556 INSERT NON-TUNNEL CV CATH: CPT | Mod: NTX

## 2022-06-14 PROCEDURE — 27000221 HC OXYGEN, UP TO 24 HOURS: Mod: NTX

## 2022-06-14 PROCEDURE — 94761 N-INVAS EAR/PLS OXIMETRY MLT: CPT | Mod: NTX

## 2022-06-14 PROCEDURE — 36415 COLL VENOUS BLD VENIPUNCTURE: CPT | Mod: NTX | Performed by: INTERNAL MEDICINE

## 2022-06-14 PROCEDURE — 20000000 HC ICU ROOM: Mod: NTX

## 2022-06-14 PROCEDURE — 85730 THROMBOPLASTIN TIME PARTIAL: CPT | Mod: NTX | Performed by: INTERNAL MEDICINE

## 2022-06-14 PROCEDURE — 99233 SBSQ HOSP IP/OBS HIGH 50: CPT | Mod: NTX,,, | Performed by: INTERNAL MEDICINE

## 2022-06-14 PROCEDURE — 25500020 PHARM REV CODE 255: Mod: NTX | Performed by: INTERNAL MEDICINE

## 2022-06-14 PROCEDURE — 85025 COMPLETE CBC W/AUTO DIFF WBC: CPT | Mod: NTX | Performed by: INTERNAL MEDICINE

## 2022-06-14 PROCEDURE — 63600175 PHARM REV CODE 636 W HCPCS: Mod: NTX | Performed by: NURSE PRACTITIONER

## 2022-06-14 PROCEDURE — 83735 ASSAY OF MAGNESIUM: CPT | Mod: NTX | Performed by: NURSE PRACTITIONER

## 2022-06-14 PROCEDURE — 25000003 PHARM REV CODE 250: Mod: NTX | Performed by: NURSE PRACTITIONER

## 2022-06-14 PROCEDURE — 99900035 HC TECH TIME PER 15 MIN (STAT): Mod: NTX

## 2022-06-14 PROCEDURE — 99233 PR SUBSEQUENT HOSPITAL CARE,LEVL III: ICD-10-PCS | Mod: NTX,,, | Performed by: INTERNAL MEDICINE

## 2022-06-14 PROCEDURE — 80053 COMPREHEN METABOLIC PANEL: CPT | Mod: NTX | Performed by: NURSE PRACTITIONER

## 2022-06-14 PROCEDURE — 63600367 HC NITRIC OXIDE PER HOUR: Mod: NTX

## 2022-06-14 RX ORDER — LANOLIN ALCOHOL/MO/W.PET/CERES
800 CREAM (GRAM) TOPICAL
Status: DISCONTINUED | OUTPATIENT
Start: 2022-06-14 | End: 2022-06-29

## 2022-06-14 RX ORDER — METOLAZONE 2.5 MG/1
2.5 TABLET ORAL ONCE
Status: COMPLETED | OUTPATIENT
Start: 2022-06-14 | End: 2022-06-14

## 2022-06-14 RX ORDER — POLYETHYLENE GLYCOL 3350 17 G/17G
17 POWDER, FOR SOLUTION ORAL DAILY
Status: DISCONTINUED | OUTPATIENT
Start: 2022-06-15 | End: 2022-06-16

## 2022-06-14 RX ORDER — POTASSIUM CHLORIDE 29.8 MG/ML
40 INJECTION INTRAVENOUS ONCE
Status: COMPLETED | OUTPATIENT
Start: 2022-06-14 | End: 2022-06-14

## 2022-06-14 RX ORDER — MAGNESIUM SULFATE HEPTAHYDRATE 40 MG/ML
2 INJECTION, SOLUTION INTRAVENOUS ONCE
Status: COMPLETED | OUTPATIENT
Start: 2022-06-14 | End: 2022-06-14

## 2022-06-14 RX ORDER — SODIUM,POTASSIUM PHOSPHATES 280-250MG
2 POWDER IN PACKET (EA) ORAL
Status: DISCONTINUED | OUTPATIENT
Start: 2022-06-14 | End: 2022-06-29

## 2022-06-14 RX ORDER — MUPIROCIN 20 MG/G
OINTMENT TOPICAL 2 TIMES DAILY
Status: COMPLETED | OUTPATIENT
Start: 2022-06-14 | End: 2022-06-18

## 2022-06-14 RX ORDER — OXYCODONE AND ACETAMINOPHEN 5; 325 MG/1; MG/1
1 TABLET ORAL EVERY 4 HOURS PRN
Status: DISCONTINUED | OUTPATIENT
Start: 2022-06-14 | End: 2022-06-15

## 2022-06-14 RX ORDER — FUROSEMIDE 10 MG/ML
80 INJECTION INTRAMUSCULAR; INTRAVENOUS ONCE
Status: COMPLETED | OUTPATIENT
Start: 2022-06-14 | End: 2022-06-14

## 2022-06-14 RX ORDER — INSULIN ASPART 100 [IU]/ML
0-5 INJECTION, SOLUTION INTRAVENOUS; SUBCUTANEOUS
Status: DISCONTINUED | OUTPATIENT
Start: 2022-06-14 | End: 2022-06-21

## 2022-06-14 RX ORDER — AMOXICILLIN 250 MG
1 CAPSULE ORAL DAILY
Status: DISCONTINUED | OUTPATIENT
Start: 2022-06-14 | End: 2022-06-29

## 2022-06-14 RX ORDER — SODIUM CHLORIDE 9 MG/ML
INJECTION, SOLUTION INTRAVENOUS CONTINUOUS
Status: DISCONTINUED | OUTPATIENT
Start: 2022-06-14 | End: 2022-07-28 | Stop reason: HOSPADM

## 2022-06-14 RX ADMIN — Medication 400 MG: at 08:06

## 2022-06-14 RX ADMIN — OXYCODONE HYDROCHLORIDE AND ACETAMINOPHEN 1 TABLET: 5; 325 TABLET ORAL at 09:06

## 2022-06-14 RX ADMIN — OXYCODONE HYDROCHLORIDE AND ACETAMINOPHEN 1 TABLET: 5; 325 TABLET ORAL at 08:06

## 2022-06-14 RX ADMIN — PANTOPRAZOLE SODIUM 40 MG: 40 TABLET, DELAYED RELEASE ORAL at 08:06

## 2022-06-14 RX ADMIN — ASPIRIN 81 MG CHEWABLE TABLET 81 MG: 81 TABLET CHEWABLE at 08:06

## 2022-06-14 RX ADMIN — HEPARIN SODIUM 12 UNITS/KG/HR: 5000 INJECTION INTRAVENOUS; SUBCUTANEOUS at 12:06

## 2022-06-14 RX ADMIN — INSULIN ASPART 3 UNITS: 100 INJECTION, SOLUTION INTRAVENOUS; SUBCUTANEOUS at 06:06

## 2022-06-14 RX ADMIN — POTASSIUM CHLORIDE 40 MEQ: 1500 TABLET, EXTENDED RELEASE ORAL at 08:06

## 2022-06-14 RX ADMIN — POTASSIUM CHLORIDE 40 MEQ: 400 INJECTION, SOLUTION INTRAVENOUS at 01:06

## 2022-06-14 RX ADMIN — HUMAN ALBUMIN MICROSPHERES AND PERFLUTREN 0.66 MG: 10; .22 INJECTION, SOLUTION INTRAVENOUS at 10:06

## 2022-06-14 RX ADMIN — ACETAMINOPHEN 650 MG: 325 TABLET ORAL at 03:06

## 2022-06-14 RX ADMIN — OXYCODONE HYDROCHLORIDE AND ACETAMINOPHEN 1 TABLET: 5; 325 TABLET ORAL at 01:06

## 2022-06-14 RX ADMIN — MILRINONE LACTATE IN DEXTROSE 0.38 MCG/KG/MIN: 200 INJECTION, SOLUTION INTRAVENOUS at 01:06

## 2022-06-14 RX ADMIN — MUPIROCIN: 20 OINTMENT TOPICAL at 08:06

## 2022-06-14 RX ADMIN — POTASSIUM BICARBONATE 50 MEQ: 978 TABLET, EFFERVESCENT ORAL at 12:06

## 2022-06-14 RX ADMIN — FUROSEMIDE 80 MG: 10 INJECTION, SOLUTION INTRAMUSCULAR; INTRAVENOUS at 09:06

## 2022-06-14 RX ADMIN — SUCRALFATE 1 G: 1 TABLET ORAL at 08:06

## 2022-06-14 RX ADMIN — INSULIN ASPART 2 UNITS: 100 INJECTION, SOLUTION INTRAVENOUS; SUBCUTANEOUS at 12:06

## 2022-06-14 RX ADMIN — METOLAZONE 2.5 MG: 2.5 TABLET ORAL at 09:06

## 2022-06-14 RX ADMIN — BUSPIRONE HYDROCHLORIDE 7.5 MG: 5 TABLET ORAL at 11:06

## 2022-06-14 RX ADMIN — FUROSEMIDE 40 MG/HR: 10 INJECTION, SOLUTION INTRAMUSCULAR; INTRAVENOUS at 02:06

## 2022-06-14 RX ADMIN — SODIUM CHLORIDE: 9 INJECTION, SOLUTION INTRAVENOUS at 12:06

## 2022-06-14 RX ADMIN — POTASSIUM BICARBONATE 25 MEQ: 978 TABLET, EFFERVESCENT ORAL at 05:06

## 2022-06-14 RX ADMIN — FUROSEMIDE 40 MG/HR: 10 INJECTION, SOLUTION INTRAMUSCULAR; INTRAVENOUS at 07:06

## 2022-06-14 RX ADMIN — INSULIN ASPART 2 UNITS: 100 INJECTION, SOLUTION INTRAVENOUS; SUBCUTANEOUS at 10:06

## 2022-06-14 RX ADMIN — MAGNESIUM SULFATE HEPTAHYDRATE 2 G: 2 INJECTION, SOLUTION INTRAVENOUS at 12:06

## 2022-06-14 RX ADMIN — MIRTAZAPINE 15 MG: 15 TABLET, FILM COATED ORAL at 08:06

## 2022-06-14 RX ADMIN — BUSPIRONE HYDROCHLORIDE 7.5 MG: 5 TABLET ORAL at 09:06

## 2022-06-14 RX ADMIN — FUROSEMIDE 40 MG/HR: 10 INJECTION, SOLUTION INTRAMUSCULAR; INTRAVENOUS at 06:06

## 2022-06-14 RX ADMIN — INSULIN ASPART 2 UNITS: 100 INJECTION, SOLUTION INTRAVENOUS; SUBCUTANEOUS at 09:06

## 2022-06-14 RX ADMIN — OXYCODONE HYDROCHLORIDE AND ACETAMINOPHEN 1 TABLET: 5; 325 TABLET ORAL at 04:06

## 2022-06-14 RX ADMIN — POTASSIUM BICARBONATE 50 MEQ: 978 TABLET, EFFERVESCENT ORAL at 09:06

## 2022-06-14 RX ADMIN — FUROSEMIDE 40 MG/HR: 10 INJECTION, SOLUTION INTRAMUSCULAR; INTRAVENOUS at 10:06

## 2022-06-14 RX ADMIN — FUROSEMIDE 40 MG/HR: 10 INJECTION, SOLUTION INTRAMUSCULAR; INTRAVENOUS at 12:06

## 2022-06-14 RX ADMIN — SENNOSIDES AND DOCUSATE SODIUM 1 TABLET: 50; 8.6 TABLET ORAL at 08:06

## 2022-06-14 NOTE — PROCEDURES
"Kevan Queen is a 37 y.o. male patient.    Temp: 97.9 °F (36.6 °C) (06/13/22 1933)  Pulse: (!) 116 (06/13/22 2158)  Resp: 10 (06/13/22 2158)  BP: 104/65 (06/13/22 2115)  SpO2: 95 % (06/13/22 2158)  Weight: 102.4 kg (225 lb 12 oz) (06/13/22 1409)  Height: 6' 3" (190.5 cm) (06/13/22 1409)    Central Line    Date/Time: 6/13/2022 10:53 PM  Performed by: Rebel Sanderson MD  Authorized by: Rebel Sanderson MD     Location procedure was performed:  [facility] CARDIOLOGY STEPDOWN UNIT ([facility])  Pre-operative diagnosis:  Acute decompensated heart failure  Post-operative diagnosis:  Acute decompensated heart failure  Consent Done ?:  Yes  Time out complete?: Verified correct patient, procedure, equipment, staff, and site/side    Indications:  Med administration, hemodynamic monitoring and vascular access  Anesthesia:  Local infiltration  Local anesthetic:  Lidocaine 1% without epinephrine  Anesthetic total (ml):  2  Preparation:  Skin prepped with ChloraPrep  Skin prep agent dried: Skin prep agent completely dried prior to procedure    Sterile barriers: All five maximal sterile barriers used - gloves, gown, cap, mask and large sterile sheet    Hand hygiene: Hand hygiene performed immediately prior to central venous catheter insertion    Location:  Right internal jugular  Catheter type:  Triple lumen  Catheter size:  7 Fr  Inserted Catheter Length (cm):  16  Ultrasound guidance: Yes    Vessel Caliber:  Large   patent  Comprressibility:  Normal  Needle advanced into vessel with real time ultrasound guidance.    Guidewire confirmed in vessel.    Steril sheath on probe.    Sterile gel used.  Manometry: Yes    Number of attempts:  1  Securement:  Line sutured, chlorhexidine patch, sterile dressing applied and blood return through all ports  Estimated blood loss (mL):  5  XRay:  Placement verified by x-ray, no pneumothorax on x-ray, tip termination and successful placement  Adverse Events:  NoneTermination Site: superior vena cava      No " flowsheet data found.  Please call with questions or concerns.     Rebel Sanderson MD  Heart Transplant PGY4  [facility]  6/13/2022

## 2022-06-14 NOTE — PLAN OF CARE
Problem: Adult Inpatient Plan of Care  Goal: Plan of Care Review  Outcome: Ongoing, Progressing  Goal: Patient-Specific Goal (Individualized)  Outcome: Ongoing, Progressing  Goal: Absence of Hospital-Acquired Illness or Injury  Outcome: Ongoing, Progressing  Intervention: Prevent Infection  Flowsheets (Taken 6/14/2022 0300)  Infection Prevention:   cohorting utilized   environmental surveillance performed   rest/sleep promoted   single patient room provided   visitors restricted/screened   equipment surfaces disinfected   hand hygiene promoted   personal protective equipment utilized  Goal: Optimal Comfort and Wellbeing  Outcome: Ongoing, Progressing  Goal: Readiness for Transition of Care  Outcome: Ongoing, Progressing     Problem: Diabetes Comorbidity  Goal: Blood Glucose Level Within Targeted Range  Outcome: Ongoing, Progressing  Intervention: Monitor and Manage Glycemia  Flowsheets (Taken 6/14/2022 0300)  Glycemic Management: blood glucose monitored     Problem: Infection  Goal: Absence of Infection Signs and Symptoms  Outcome: Ongoing, Progressing     Problem: Fall Injury Risk  Goal: Absence of Fall and Fall-Related Injury  Outcome: Ongoing, Progressing     Problem: Skin Injury Risk Increased  Goal: Skin Health and Integrity  Outcome: Ongoing, Progressing

## 2022-06-14 NOTE — CARE UPDATE
Transplant Update Note    Patient was on the floor, asked to follow up on hemodynamics.  Hemodynamics assessed at 8 pm  CVP 27  SvO2 29  CO 3.90  CI 1.77  SVR 1088    Called staff immediately with update, patient hemodynamically stable on room air for the time being but urgent concerns for decompensation. Administered Lasix 80 mg IV push x 1 and increased gtt from 20 mg/hr to 40 mg/hr. Continue milrinone 0.375 mcg/kg/min. Start Marcella 10 ppm.  Bedside lactate 1.11 @ 9:15 pm, virtually unchanged from prior (1.13). No indication for shock team activation at this time but will swiftly move patient to ICU and obtain line access and bedside MCS.   Interventional Cardiology consulted for assistance with bedside IABP placement. Right IJ TLC placed, manometry at the time of placement showed CVP ~ 30.   Shortly thereafter, patient had IABP placed at bedside. Placed on 1:1 and started on minimal intensity heparin gtt with nomogram without bolus.     Discussed with [facility] staff.    Please call with questions or concerns.     Rebel Sanderson MD  Heart Transplant PGY4  [facility]

## 2022-06-14 NOTE — PROGRESS NOTES
"[facility] - Cardiac Intensive Care  Adult Nutrition  Progress Note    SUMMARY       Recommendations  1. Continue current cardiac/diabetic diet-fluid per MD   2. RD following    Goals: Meet % EEN/EPN by RD f/u date  Nutrition Goal Status: progressing towards goal  Communication of RD Recs: other (POC)    Assessment and Plan    Nutrition Problem  Increased protein needs     Related to (etiology):   Physiological values     Signs and Symptoms (as evidenced by):   HF     Interventions (treatment strategy):  Collaboration of nutrition care w/ other providers     Nutrition Diagnosis Status:   New      Reason for Assessment  Reason For Assessment: RD requested to see pt in rounds  Diagnosis: cardiac disease  Relevant Medical History: CHF, DM, cardiomyopathy  Interdisciplinary Rounds: attended  General Information Comments: Spoke w/ pt at bedside, reports a good appetite currently and PTA. Reports typical consumption of 2-3 meals PTA. Pt reports consuming all of breakfast provided. Reports UBW falls at 217 #. No issues chewing/swallowing at this time. No n/v/d/c. NFPE completed 6/14/22 no s/s of malnutrition at this time. Diabetic education provided during last pt's visit. RD reiterated for pt to follow a diabetic diet at home, pt v/u understanding, and handouts provided. LBM noted-6/13/22  Nutrition Discharge Planning: diabetic/cardiac diet    Nutrition Risk Screen    Nutrition Risk Screen: no indicators present    Anthropometrics    Temp: 96.2 °F (35.7 °C)  Height Method: Stated  Height: 6' 3" (190.5 cm)  Height (inches): 75 in  Weight Method: Bed Scale  Weight: 100.7 kg (222 lb)  Weight (lb): 222 lb  Ideal Body Weight (IBW), Male: 196 lb  % Ideal Body Weight, Male (lb): 113.27 %  BMI (Calculated): 27.7  BMI Grade: 25 - 29.9 - overweight     Lab/Procedures/Meds    Pertinent Labs Reviewed: reviewed  Pertinent Labs Comments: Sodium 133, Cr 1.8, GFR 54.4, Glucose 234, Calcium 8.6  Pertinent Medications Reviewed: " reviewed  Pertinent Medications Comments: senna docusate    Estimated/Assessed Needs    Weight Used For Calorie Calculations: 100.7 kg (222 lb 0.1 oz)  Energy Calorie Requirements (kcal): 2017  Energy Need Method: [place] Jeor (PAL 1.0)  Protein Requirements: 121 g (1.2 g/kg)  Weight Used For Protein Calculations: 100.7 kg (222 lb 0.1 oz)   RDA Method (mL): 2017  CHO Requirement: 252 g    Nutrition Prescription Ordered    Current Diet Order: Cardiac/diabetic diet    Evaluation of Received Nutrient/Fluid Intake    I/O: -5.3 L since admit  Energy Calories Required: meeting needs  Protein Required: meeting needs  Fluid Required: meeting needs  Total Fluid Intake (mL/kg): 1 ml or fluid per MD  Tolerance: tolerating  % Intake of Estimated Energy Needs: %  % Meal Intake: %    Nutrition Risk    Level of Risk/Frequency of Follow-up: low     Monitor and Evaluation    Food and Nutrient Intake: energy intake, food and beverage intake  Food and Nutrient Adminstration: diet order  Knowledge/Beliefs/Attitudes: food and nutrition knowledge/skill, beliefs and attitudes  Physical Activity and Function: nutrition-related ADLs and IADLs, factors affecting access to physical activity  Anthropometric Measurements: height/length, weight, weight change, body mass index, growth pattern indices/percentile ranks  Biochemical Data, Medical Tests and Procedures: electrolyte and renal panel, gastrointestinal profile, glucose/endocrine profile, inflammatory profile, lipid profile  Nutrition-Focused Physical Findings: overall appearance, extremities, muscles and bones, head and eyes, skin     Nutrition Follow-Up    RD Follow-up?: Yes   By Samantha IVY

## 2022-06-14 NOTE — PROGRESS NOTES
Interdisciplinary Rounds Report:   Attended interdisciplinary rounds with the [facility]/CTS services including the LVAD Coordinators, social workers, cardiologists, surgeons,  PT/OT/Speech, dietician, and unit charge nurses. Discussed patient status, plan of care, goals of care, including DC date, and post discharge needs. Plan of care will be discussed with the patient and/or family per the physician while rounding on the floor. This is a recurring meeting that is medically and socially necessary to collaborate with the interdisciplinary team to assist patient needs and safe discharge.

## 2022-06-14 NOTE — CARE UPDATE
Transplant Update Note    Hemodynamics at 8 pm    CVP 18  SvO2 46  CO 4.91  CI 2.23  SVR 1172    Urine output has been robust, at least 200 cc/hr; max concentrate the Lasix gtt (previously 2 mg/mL) to 10 mg/mL concentration with a Lasix 80 mg IV bolus and administer metolazone 2.5 mg PO x 1. Aggressive diuresis to achieve goal CVP 10 tonight.     Please call with questions or concerns. Discussed with staff.    Rebel Sanderson MD  Heart Transplant PGY4  [facility]    Update Note    Hemodynamics at 3:15 am    CVP 10  SvO2 68  CO 8.68  CI 3.95    Robust urine output (~300 cc/hr or greater)    No acute changes in management.     Please call with questions or concerns.     Rebel Sanderson MD  Heart Transplant PGY4  [facility]

## 2022-06-14 NOTE — PLAN OF CARE
Recommendations  1. Continue current cardiac/diabetic diet-fluid per MD   2. RD following     Goals: Meet % EEN/EPN by RD f/u date  Nutrition Goal Status: progressing towards goal  Communication of RD Recs: other (POC)    By Samantha IVY

## 2022-06-14 NOTE — BRIEF OP NOTE
Intra Aortic Balloon Pump Procedure Note  Interventional Cardiology Service      Physician: Angela Johnson MD    Procedure: Intra Aortic Balloon Pump Placement, Bedside  Date: 06/13/2022  Type of Device: Intra Aortic Balloon Pump  Size: 40 cc linear   Device SN or lot #: [id number]  Expiration date: 05-  Access: R CFA    Anesthesia: Local  Full Drape Used: Yes    Procedure: The R groin was prepped and draped in normal sterile fashion. An ultrasound was used for guidance, a micropuncture needle was used to access the R femoral artery. Then over the wire, the vessel was dilated and the micro puncture catheter was passed into the vessel via the Seldinger technique. A small knick was made in the skin and the micropuncture catheter was exchanged with an 8Fr sheath. The sheath was sutured in place. 40 cc linear balloon pump was placed in R femoral artery. Chest x-ray confirmed positioning post procedure. The device settings and alarms were verified to be accurate. Post procedure, R groin was without hematoma/excessive bleeding. Peripheral pulses were intact, pre and post procedure.     Tahir X-ray: Proper placement of IABP    Estimated Blood loss: <20 cc     Complications: None    Assessment/Recommendations:  - Monitor bilateral DP/PT pulses Q1H while IABP in place.  - Notify on-call Interventional Cardiology fellow if any change in exam.   - Strict bedrest.  - Daily chest x-rays.  - Consider starting heparin infusion while IABP in place if no contraindications exist. Defer initiation to primary team.      Staff: Dr Otoole

## 2022-06-14 NOTE — PROGRESS NOTES
Pt cvp 27, svo2 29   Dr Sanderson notified. Bolus 80mg ivp lasix given. Lasix gtt 40mg/hr. Line placement being done before transfer  Transfer orders placed. Sicu nurse given report.

## 2022-06-14 NOTE — PROGRESS NOTES
06/14/2022  Perfusionist:  John Alaniz CCP, LP  * Surgery not found *  Anesthesiologist:  Responsible provider cannot be found from this context.  Primary Perfusionist:: John Alaniz CCP, LP    Past Medical History:   Diagnosis Date    Arthritis     Cardiomyopathy     CHF (congestive heart failure) 10/01/2020    Diabetes mellitus     Dilated cardiomyopathy 10/26/2020    Drug abuse 10/2020    Hyperosmolar hyperglycemic state (HHS) 5/25/2022    ICD (implantable cardioverter-defibrillator) in place 10/26/2020    Renal disorder        Device implanted: IABP  Console SN:  12  If intra-aortic balloon pump - size: 7.5Fr 40cc      At the end of the procedure, the current device settings were verified to be accurate.  The patient was transported post operatively with back-up/emergency equipment that was delivered to the patient's room and verified by the bedside nurse, and report was given.  I was available by phone for management and troubleshooting concerns by the unit staff.  There were no issues.    3:46 PM

## 2022-06-14 NOTE — SUBJECTIVE & OBJECTIVE
**Interval History: Moved to ICU last night in the setting of significant volume overload and low output state. IABP placed, Lasix increased to 40 mg/hr and Epi and Marcella added to Milrinone. With IABP at 1:1, sVO2 has come up to 51 with CO/CI 5.29/2.31 and SVR is 922. Net -ve 5.2L since admit, CVP trending down at 18. sCr unchanged at 1.8. Feels much better    Continuous Infusions:   sodium chloride 0.9% Stopped (06/14/22 0710)    EPINEPHrine 0.02 mcg/kg/min (06/14/22 0905)    furosemide (LASIX) 2 mg/mL continuous infusion (non-titrating) 40 mg/hr (06/14/22 0905)    heparin (porcine) in D5W 14 Units/kg/hr (06/14/22 1036)    milrinone 20mg/100ml D5W (200mcg/ml) 0.375 mcg/kg/min (06/14/22 0905)    nitric oxide gas       Scheduled Meds:   aspirin  81 mg Oral Daily    busPIRone  7.5 mg Oral Q12H    magnesium oxide  400 mg Oral BID    mirtazapine  15 mg Oral Nightly    mupirocin   Nasal BID    pantoprazole  40 mg Oral Daily    potassium chloride SA  40 mEq Oral BID    senna-docusate 8.6-50 mg  1 tablet Oral Daily    sucralfate  1 g Oral Nightly     PRN Meds:acetaminophen, dextrose 10%, dextrose 10%, glucagon (human recombinant), glucose, glucose, insulin aspart U-100, magnesium oxide, magnesium oxide, oxyCODONE-acetaminophen, potassium bicarbonate, potassium bicarbonate, potassium bicarbonate, potassium, sodium phosphates, potassium, sodium phosphates, potassium, sodium phosphates, promethazine, sodium chloride 0.9%    Review of patient's allergies indicates:   Allergen Reactions    Bumex [bumetanide] Hives    Lactose Diarrhea     Other reaction(s): Abdominal distension, gaseous    Torsemide Hives     Objective:     Vital Signs (Most Recent):  Temp: 96.2 °F (35.7 °C) (06/14/22 1105)  Pulse: 105 (06/14/22 1105)  Resp: (!) 22 (06/14/22 1105)  BP: 109/66 (06/14/22 1105)  SpO2: 100 % (06/14/22 1105)   Vital Signs (24h Range):  Temp:  [96.2 °F (35.7 °C)-98.4 °F (36.9 °C)] 96.2 °F (35.7 °C)  Pulse:  [] 105  Resp:  [10-26]  22  SpO2:  [87 %-100 %] 100 %  BP: ()/(52-89) 109/66     Patient Vitals for the past 72 hrs (Last 3 readings):   Weight   06/14/22 0905 100.7 kg (222 lb)   06/14/22 0030 101.1 kg (222 lb 14.2 oz)   06/13/22 1409 102.4 kg (225 lb 12 oz)     Body mass index is 27.75 kg/m².      Intake/Output Summary (Last 24 hours) at 6/14/2022 1119  Last data filed at 6/14/2022 0905  Gross per 24 hour   Intake 1243.54 ml   Output 6150 ml   Net -4906.46 ml       Hemodynamic Parameters:  CVP:  [20 mmHg-27 mmHg] 27 mmHg    Telemetry: ST    Physical Exam  Constitutional:       Appearance: Normal appearance.   HENT:      Head: Normocephalic and atraumatic.   Eyes:      Conjunctiva/sclera: Conjunctivae normal.      Pupils: Pupils are equal, round, and reactive to light.   Neck:      Comments: RIJ TLC  Cardiovascular:      Rate and Rhythm: Regular rhythm. Tachycardia present.      Comments: +S3  Pulmonary:      Effort: Pulmonary effort is normal.      Breath sounds: Normal breath sounds.   Abdominal:      General: Bowel sounds are normal. There is distension.   Musculoskeletal:         General: Normal range of motion.      Cervical back: Neck supple.      Comments: Trace TREVER   Skin:     General: Skin is warm and dry.      Capillary Refill: Capillary refill takes 2 to 3 seconds.   Neurological:      General: No focal deficit present.      Mental Status: He is alert and oriented to person, place, and time.   Psychiatric:         Mood and Affect: Mood normal.         Behavior: Behavior normal.         Thought Content: Thought content normal.         Judgment: Judgment normal.       Significant Labs:  CBC:  Recent Labs   Lab 06/13/22  0901 06/13/22  1352 06/14/22  0348   WBC 8.67 8.21 7.08   RBC 3.57* 3.39* 3.30*   HGB 11.0* 10.4* 10.1*   HCT 32.9* 31.0* 30.4*    217 228   MCV 92 91 92   MCH 30.8 30.7 30.6   MCHC 33.4 33.5 33.2     BNP:  Recent Labs   Lab 06/13/22  0901   BNP 1,701*     CMP:  Recent Labs   Lab 06/13/22  0901  06/13/22  1748 06/13/22 2150 06/14/22  0348   * 175* 170* 234*   CALCIUM 9.1 8.6* 8.6* 8.6*   ALBUMIN 3.6  --   --  3.5   PROT 7.2  --   --  6.5   * 136 138 133*   K 3.5 3.9 3.6 3.9   CO2 18* 21* 23 24    102 102 98   BUN 19 18 18 17   CREATININE 1.8* 1.7* 1.8* 1.8*   ALKPHOS 81  --   --  79   ALT 19  --   --  24   AST 33  --   --  31   BILITOT 1.1*  --   --  1.1*      Coagulation:   Recent Labs   Lab 06/13/22 2150 06/14/22  0549   INR 1.2  --    APTT 26.8 29.1     LDH:  No results for input(s): LDH in the last 72 hours.  Microbiology:  Microbiology Results (last 7 days)       ** No results found for the last 168 hours. **            I have reviewed all pertinent labs within the past 24 hours.    Estimated Creatinine Clearance: 67.2 mL/min (A) (based on SCr of 1.8 mg/dL (H)).    Diagnostic Results:  I have reviewed and interpreted all pertinent imaging results/findings within the past 24 hours.

## 2022-06-14 NOTE — CARE UPDATE
Transplant Update Note    Hemodynamics at approximately 3:30 am    CVP 24  SvO2 51  CO 5.94  CI 2.70    Net - 4.5 L    IABP 1:1 on minimal intensity hep gtt (appreciate IC help), epi 0.02 mcg/kg/min, milrinone (home) 0.375 mcg/kg/min, Lasix gtt increased from 20 mg/hr to 40 mg/hr (w/ bolus of 80 mg IVP), and Marcella @ 10 ppm.  Significant improvement in hemodynamics as noted above, continue the same for now, monitor I&O. Patient stopped smoking cigarettes in Apr 2022 and stopped smoking marijuana last year per patient and wife.     Please call with questions or concerns.     Rebel aSnderson MD  Heart Transplant PGY4  [facility]

## 2022-06-14 NOTE — PROGRESS NOTES
06/14/2022  John Alaniz    Current provider:  Natalya Villatoro MD    Device interrogation:  No flowsheet data found.       Rounded on Kevan Bernice to ensure all mechanical assist device settings (IABP or VAD) were appropriate and all parameters were within limits.  I was able to ensure all back up equipment was present, the staff had no issues, and the Perfusion Department daily rounding was complete.      For implantable VADs: Interrogation of Ventricular assist device was performed with analysis of device parameters and review of device function. I have personally reviewed the interrogation findings and agree with findings as stated.     In emergency, the nursing units have been notified to contact the perfusion department either by:  Calling [phone] from 630am to 4pm Mon thru Fri, utilizing the On-Call Finder functionality of Epic and searching for Perfusion, or by contacting the hospital  from 4pm to 630am and on weekends and asking to speak with the perfusionist on call.    3:46 PM

## 2022-06-14 NOTE — PROGRESS NOTES
[facility] - Cardiac Intensive Care  Heart Transplant  Progress Note    Patient Name: Kevan Queen  MRN: [id number]  Admission Date: 6/13/2022  Hospital Length of Stay: 1 days  Attending Physician: Natalya Villatoro MD  Primary Care Provider: ORALIA Cline  Principal Problem:Acute on chronic combined systolic and diastolic heart failure, NYHA class 4    Subjective:     **Interval History: Moved to ICU last night in the setting of significant volume overload and low output state. IABP placed, Lasix increased to 40 mg/hr and Epi and Marcella added to Milrinone. With IABP at 1:1, sVO2 has come up to 51 with CO/CI 5.29/2.31 and SVR is 922. Net -ve 5.2L since admit, CVP trending down at 18. sCr unchanged at 1.8. Feels much better    Continuous Infusions:   sodium chloride 0.9% Stopped (06/14/22 0710)    EPINEPHrine 0.02 mcg/kg/min (06/14/22 0905)    furosemide (LASIX) 2 mg/mL continuous infusion (non-titrating) 40 mg/hr (06/14/22 0905)    heparin (porcine) in D5W 14 Units/kg/hr (06/14/22 1036)    milrinone 20mg/100ml D5W (200mcg/ml) 0.375 mcg/kg/min (06/14/22 0905)    nitric oxide gas       Scheduled Meds:   aspirin  81 mg Oral Daily    busPIRone  7.5 mg Oral Q12H    magnesium oxide  400 mg Oral BID    mirtazapine  15 mg Oral Nightly    mupirocin   Nasal BID    pantoprazole  40 mg Oral Daily    potassium chloride SA  40 mEq Oral BID    senna-docusate 8.6-50 mg  1 tablet Oral Daily    sucralfate  1 g Oral Nightly     PRN Meds:acetaminophen, dextrose 10%, dextrose 10%, glucagon (human recombinant), glucose, glucose, insulin aspart U-100, magnesium oxide, magnesium oxide, oxyCODONE-acetaminophen, potassium bicarbonate, potassium bicarbonate, potassium bicarbonate, potassium, sodium phosphates, potassium, sodium phosphates, potassium, sodium phosphates, promethazine, sodium chloride 0.9%    Review of patient's allergies indicates:   Allergen Reactions    Bumex [bumetanide] Hives    Lactose Diarrhea     Other  reaction(s): Abdominal distension, gaseous    Torsemide Hives     Objective:     Vital Signs (Most Recent):  Temp: 96.2 °F (35.7 °C) (06/14/22 1105)  Pulse: 105 (06/14/22 1105)  Resp: (!) 22 (06/14/22 1105)  BP: 109/66 (06/14/22 1105)  SpO2: 100 % (06/14/22 1105)   Vital Signs (24h Range):  Temp:  [96.2 °F (35.7 °C)-98.4 °F (36.9 °C)] 96.2 °F (35.7 °C)  Pulse:  [] 105  Resp:  [10-26] 22  SpO2:  [87 %-100 %] 100 %  BP: ()/(52-89) 109/66     Patient Vitals for the past 72 hrs (Last 3 readings):   Weight   06/14/22 0905 100.7 kg (222 lb)   06/14/22 0030 101.1 kg (222 lb 14.2 oz)   06/13/22 1409 102.4 kg (225 lb 12 oz)     Body mass index is 27.75 kg/m².      Intake/Output Summary (Last 24 hours) at 6/14/2022 1119  Last data filed at 6/14/2022 0905  Gross per 24 hour   Intake 1243.54 ml   Output 6150 ml   Net -4906.46 ml       Hemodynamic Parameters:  CVP:  [20 mmHg-27 mmHg] 27 mmHg    Telemetry: ST    Physical Exam  Constitutional:       Appearance: Normal appearance.   HENT:      Head: Normocephalic and atraumatic.   Eyes:      Conjunctiva/sclera: Conjunctivae normal.      Pupils: Pupils are equal, round, and reactive to light.   Neck:      Comments: RIJ TLC  Cardiovascular:      Rate and Rhythm: Regular rhythm. Tachycardia present.      Comments: +S3  Pulmonary:      Effort: Pulmonary effort is normal.      Breath sounds: Normal breath sounds.   Abdominal:      General: Bowel sounds are normal. There is distension.   Musculoskeletal:         General: Normal range of motion.      Cervical back: Neck supple.      Comments: Trace TREVER   Skin:     General: Skin is warm and dry.      Capillary Refill: Capillary refill takes 2 to 3 seconds.   Neurological:      General: No focal deficit present.      Mental Status: He is alert and oriented to person, place, and time.   Psychiatric:         Mood and Affect: Mood normal.         Behavior: Behavior normal.         Thought Content: Thought content normal.          "Judgment: Judgment normal.       Significant Labs:  CBC:  Recent Labs   Lab 06/13/22  0901 06/13/22  1352 06/14/22  0348   WBC 8.67 8.21 7.08   RBC 3.57* 3.39* 3.30*   HGB 11.0* 10.4* 10.1*   HCT 32.9* 31.0* 30.4*    217 228   MCV 92 91 92   MCH 30.8 30.7 30.6   MCHC 33.4 33.5 33.2     BNP:  Recent Labs   Lab 06/13/22  0901   BNP 1,701*     CMP:  Recent Labs   Lab 06/13/22  0901 06/13/22  1748 06/13/22 2150 06/14/22  0348   * 175* 170* 234*   CALCIUM 9.1 8.6* 8.6* 8.6*   ALBUMIN 3.6  --   --  3.5   PROT 7.2  --   --  6.5   * 136 138 133*   K 3.5 3.9 3.6 3.9   CO2 18* 21* 23 24    102 102 98   BUN 19 18 18 17   CREATININE 1.8* 1.7* 1.8* 1.8*   ALKPHOS 81  --   --  79   ALT 19  --   --  24   AST 33  --   --  31   BILITOT 1.1*  --   --  1.1*      Coagulation:   Recent Labs   Lab 06/13/22 2150 06/14/22  0549   INR 1.2  --    APTT 26.8 29.1     LDH:  No results for input(s): LDH in the last 72 hours.  Microbiology:  Microbiology Results (last 7 days)       ** No results found for the last 168 hours. **            I have reviewed all pertinent labs within the past 24 hours.    Estimated Creatinine Clearance: 67.2 mL/min (A) (based on SCr of 1.8 mg/dL (H)).    Diagnostic Results:  I have reviewed and interpreted all pertinent imaging results/findings within the past 24 hours.    Assessment and Plan:     38 yo male with NIDCM with BiV systolic heart failure, on home Milrinone at 0.375 mcg/kg/min, presented at [facility] 4/2/22 ,was not evaluated for OHT as he has recently quit smoking in April 2022 but was approved for VAD with plan to begin OHT evaluation in upcoming months if Mr Queen is stable and suitable for OHT eval (blood group A), issues with frozen shoulder following ICD implant in the past, had clinic appointment last week to f/u recent admit for hyperglycemic hyperosmolar syndrome but did not come as he was "feeling too bad" presents to our ED with SOB at rest for 1 week, 6# weight " gain (reports dry weight is 217#), inability to sleep past 3 nights 2/2 SOB (says he sleep on his side). Went to ED at [facility] 6/10 but left after waiting 4 hours. Had clinic appointment with us today, but arrived to [facility] early this morning and decided to go to the ED instead. Baseline Lasix dose is 80 mg bid. Reports taking 240 mg qd past 3 days with no improvement. BNP is 1701, up from 898 on 6/2 and 49 on 5/24. sCr is 1.8 with baseline ~ 1.5-1.7. sPO2 on RA is 93%. Wife at bedside    He has been given Lasix 80 mg IVP in the ED with plan to start Lasix gtt at 20 mg/hr           * Acute on chronic combined systolic and diastolic heart failure, NYHA class 4  -[facility]  -Approved for LVAD at [facility] on 4/2/22. Was not evaluated for OHTx as he quit smoking in April 2022  -Moved to ICU overnight in the setting of significant volume overloaded and low output state. IABP placed 6/13, Lasix increased to 40 mg/hr and Epi and Marcella added to Primacor. sVO2 has improved at 51 with CO/CI 5.29/2.31 and   -CVP trending down at 18, net -ve 5.2L since admit  -TTE done 4/19/22: LVEDD 7.3, LVEF 15%, diastolic dysfunction, RV severely dilated and RV function mod depressed, mod MR, mild TR, PAS > 31 and IVC 3. Will repeat TTE  -RHC done 4/19/22 on Milrinone 0.5 mcg/kg/min: RA 11, PA 50/27 (35), W 27, CO/CI 3.7/1.7, PVR 2.2 and SVR 1535   -GDMT: Entresto and Aldactone on hold since admit 5/24-5/27 2/2 elevated sCr  -Baseline dose of Lasix is 80 mg bid. He has taken 240 mg qd for the past 3 days with no improvement in SOB at rest  -Reports dry weight is 217#, and that he had gained 6# on his home scale  -May need LVAD this admit          Uncontrolled diabetes mellitus  -Admitted 5/24-5/27 with hyperglycemia hyperosmolar syndrome  -Hgb A1C 9.2 on 5/20/22  -Glucose per labs today 169  -SSI for now    CKD (chronic kidney disease) stage 3, GFR 30-59 ml/min  -Admit sCr 1.8, baseline ~ 1.5-1.7  -Monitor with  diuresis    Cardiogenic shock  -See A/C CHF      Uninterrupted Critical Care/Counseling Time (not including procedures): 60 minutes      Alana Cain NP [id number]  Heart Transplant  [facility] - Cardiac Intensive Care

## 2022-06-15 DIAGNOSIS — I42.0 DILATED CARDIOMYOPATHY: ICD-10-CM

## 2022-06-15 DIAGNOSIS — R57.0 CARDIOGENIC SHOCK: ICD-10-CM

## 2022-06-15 DIAGNOSIS — I50.43 ACUTE ON CHRONIC COMBINED SYSTOLIC AND DIASTOLIC HEART FAILURE, NYHA CLASS 4: Primary | ICD-10-CM

## 2022-06-15 PROBLEM — R25.2 MUSCLE CRAMPING: Status: ACTIVE | Noted: 2022-06-15

## 2022-06-15 LAB
ALBUMIN SERPL BCP-MCNC: 3.7 G/DL (ref 3.5–5.2)
ALLENS TEST: ABNORMAL
ALLENS TEST: NORMAL
ALP SERPL-CCNC: 81 U/L (ref 55–135)
ALT SERPL W/O P-5'-P-CCNC: 25 U/L (ref 10–44)
ANION GAP SERPL CALC-SCNC: 12 MMOL/L (ref 8–16)
APTT BLDCRRT: 30.2 SEC (ref 21–32)
APTT BLDCRRT: 30.3 SEC (ref 21–32)
APTT BLDCRRT: 38.5 SEC (ref 21–32)
AST SERPL-CCNC: 28 U/L (ref 10–40)
BASOPHILS # BLD AUTO: 0.01 K/UL (ref 0–0.2)
BASOPHILS NFR BLD: 0.1 % (ref 0–1.9)
BILIRUB SERPL-MCNC: 1.1 MG/DL (ref 0.1–1)
BUN SERPL-MCNC: 15 MG/DL (ref 6–20)
CALCIUM SERPL-MCNC: 9 MG/DL (ref 8.7–10.5)
CHLORIDE SERPL-SCNC: 94 MMOL/L (ref 95–110)
CO2 SERPL-SCNC: 28 MMOL/L (ref 23–29)
CREAT SERPL-MCNC: 1.4 MG/DL (ref 0.5–1.4)
DELSYS: ABNORMAL
DIFFERENTIAL METHOD: ABNORMAL
EOSINOPHIL # BLD AUTO: 0.1 K/UL (ref 0–0.5)
EOSINOPHIL NFR BLD: 0.6 % (ref 0–8)
ERYTHROCYTE [DISTWIDTH] IN BLOOD BY AUTOMATED COUNT: 14.6 % (ref 11.5–14.5)
EST. GFR  (AFRICAN AMERICAN): >60 ML/MIN/1.73 M^2
EST. GFR  (NON AFRICAN AMERICAN): >60 ML/MIN/1.73 M^2
GLUCOSE SERPL-MCNC: 191 MG/DL (ref 70–110)
HCO3 UR-SCNC: 33 MMOL/L (ref 24–28)
HCO3 UR-SCNC: 34.8 MMOL/L (ref 24–28)
HCO3 UR-SCNC: 35.2 MMOL/L (ref 24–28)
HCO3 UR-SCNC: 35.3 MMOL/L (ref 24–28)
HCO3 UR-SCNC: 37.1 MMOL/L (ref 24–28)
HCT VFR BLD AUTO: 31.5 % (ref 40–54)
HCT VFR BLD CALC: 36 %PCV (ref 36–54)
HCT VFR BLD CALC: 36 %PCV (ref 36–54)
HCT VFR BLD CALC: 37 %PCV (ref 36–54)
HGB BLD-MCNC: 10.4 G/DL (ref 14–18)
IMM GRANULOCYTES # BLD AUTO: 0.02 K/UL (ref 0–0.04)
IMM GRANULOCYTES NFR BLD AUTO: 0.2 % (ref 0–0.5)
LDH SERPL L TO P-CCNC: 1.67 MMOL/L (ref 0.5–2.2)
LYMPHOCYTES # BLD AUTO: 1.8 K/UL (ref 1–4.8)
LYMPHOCYTES NFR BLD: 19.3 % (ref 18–48)
MAGNESIUM SERPL-MCNC: 2.1 MG/DL (ref 1.6–2.6)
MCH RBC QN AUTO: 30.4 PG (ref 27–31)
MCHC RBC AUTO-ENTMCNC: 33 G/DL (ref 32–36)
MCV RBC AUTO: 92 FL (ref 82–98)
METHEMOGLOBIN: 0.9 % (ref 0–3)
MONOCYTES # BLD AUTO: 0.7 K/UL (ref 0.3–1)
MONOCYTES NFR BLD: 7.6 % (ref 4–15)
NEUTROPHILS # BLD AUTO: 6.7 K/UL (ref 1.8–7.7)
NEUTROPHILS NFR BLD: 72.2 % (ref 38–73)
NRBC BLD-RTO: 0 /100 WBC
PCO2 BLDA: 56.4 MMHG (ref 35–45)
PCO2 BLDA: 57.6 MMHG (ref 35–45)
PCO2 BLDA: 58.7 MMHG (ref 35–45)
PCO2 BLDA: 59 MMHG (ref 35–45)
PCO2 BLDA: 61.9 MMHG (ref 35–45)
PH SMN: 7.38 [PH] (ref 7.35–7.45)
PH SMN: 7.39 [PH] (ref 7.35–7.45)
PLATELET # BLD AUTO: 257 K/UL (ref 150–450)
PMV BLD AUTO: 10.4 FL (ref 9.2–12.9)
PO2 BLDA: 29 MMHG (ref 40–60)
PO2 BLDA: 29 MMHG (ref 40–60)
PO2 BLDA: 31 MMHG (ref 40–60)
PO2 BLDA: 33 MMHG (ref 40–60)
PO2 BLDA: 37 MMHG (ref 40–60)
POC BE: 10 MMOL/L
POC BE: 12 MMOL/L
POC BE: 8 MMOL/L
POC IONIZED CALCIUM: 1.21 MMOL/L (ref 1.06–1.42)
POC IONIZED CALCIUM: 1.24 MMOL/L (ref 1.06–1.42)
POC IONIZED CALCIUM: 1.25 MMOL/L (ref 1.06–1.42)
POC SATURATED O2: 51 % (ref 95–100)
POC SATURATED O2: 53 % (ref 95–100)
POC SATURATED O2: 57 % (ref 95–100)
POC SATURATED O2: 60 % (ref 95–100)
POC SATURATED O2: 68 % (ref 95–100)
POC TCO2: 35 MMOL/L (ref 24–29)
POC TCO2: 37 MMOL/L (ref 24–29)
POC TCO2: 39 MMOL/L (ref 24–29)
POCT GLUCOSE: 189 MG/DL (ref 70–110)
POCT GLUCOSE: 227 MG/DL (ref 70–110)
POCT GLUCOSE: 238 MG/DL (ref 70–110)
POCT GLUCOSE: 263 MG/DL (ref 70–110)
POTASSIUM BLD-SCNC: 3.4 MMOL/L (ref 3.5–5.1)
POTASSIUM BLD-SCNC: 3.8 MMOL/L (ref 3.5–5.1)
POTASSIUM BLD-SCNC: 4.1 MMOL/L (ref 3.5–5.1)
POTASSIUM SERPL-SCNC: 3.8 MMOL/L (ref 3.5–5.1)
PROT SERPL-MCNC: 7.1 G/DL (ref 6–8.4)
RBC # BLD AUTO: 3.42 M/UL (ref 4.6–6.2)
SAMPLE: ABNORMAL
SAMPLE: NORMAL
SITE: ABNORMAL
SITE: NORMAL
SODIUM BLD-SCNC: 132 MMOL/L (ref 136–145)
SODIUM BLD-SCNC: 134 MMOL/L (ref 136–145)
SODIUM BLD-SCNC: 135 MMOL/L (ref 136–145)
SODIUM SERPL-SCNC: 134 MMOL/L (ref 136–145)
WBC # BLD AUTO: 9.34 K/UL (ref 3.9–12.7)

## 2022-06-15 PROCEDURE — 93005 ELECTROCARDIOGRAM TRACING: CPT | Mod: NTX

## 2022-06-15 PROCEDURE — 85025 COMPLETE CBC W/AUTO DIFF WBC: CPT | Mod: NTX | Performed by: INTERNAL MEDICINE

## 2022-06-15 PROCEDURE — 85730 THROMBOPLASTIN TIME PARTIAL: CPT | Mod: 91,NTX | Performed by: INTERNAL MEDICINE

## 2022-06-15 PROCEDURE — 99292 PR CRITICAL CARE, ADDL 30 MIN: ICD-10-PCS | Mod: NTX,,, | Performed by: INTERNAL MEDICINE

## 2022-06-15 PROCEDURE — 94761 N-INVAS EAR/PLS OXIMETRY MLT: CPT | Mod: NTX

## 2022-06-15 PROCEDURE — 63600175 PHARM REV CODE 636 W HCPCS: Mod: NTX | Performed by: NURSE PRACTITIONER

## 2022-06-15 PROCEDURE — 63600175 PHARM REV CODE 636 W HCPCS: Mod: NTX | Performed by: PHYSICIAN ASSISTANT

## 2022-06-15 PROCEDURE — 63600367 HC NITRIC OXIDE PER HOUR: Mod: NTX

## 2022-06-15 PROCEDURE — 97162 PT EVAL MOD COMPLEX 30 MIN: CPT | Mod: NTX

## 2022-06-15 PROCEDURE — 85014 HEMATOCRIT: CPT | Mod: NTX

## 2022-06-15 PROCEDURE — 25000003 PHARM REV CODE 250: Mod: NTX | Performed by: INTERNAL MEDICINE

## 2022-06-15 PROCEDURE — 25000003 PHARM REV CODE 250: Mod: NTX | Performed by: NURSE PRACTITIONER

## 2022-06-15 PROCEDURE — 83050 HGB METHEMOGLOBIN QUAN: CPT | Mod: NTX

## 2022-06-15 PROCEDURE — 83605 ASSAY OF LACTIC ACID: CPT | Mod: NTX

## 2022-06-15 PROCEDURE — 82565 ASSAY OF CREATININE: CPT | Mod: NTX

## 2022-06-15 PROCEDURE — 80053 COMPREHEN METABOLIC PANEL: CPT | Mod: NTX | Performed by: NURSE PRACTITIONER

## 2022-06-15 PROCEDURE — 99900035 HC TECH TIME PER 15 MIN (STAT): Mod: NTX

## 2022-06-15 PROCEDURE — 93010 EKG 12-LEAD: ICD-10-PCS | Mod: NTX,,, | Performed by: INTERNAL MEDICINE

## 2022-06-15 PROCEDURE — 63600175 PHARM REV CODE 636 W HCPCS: Mod: NTX | Performed by: INTERNAL MEDICINE

## 2022-06-15 PROCEDURE — 84132 ASSAY OF SERUM POTASSIUM: CPT | Mod: NTX

## 2022-06-15 PROCEDURE — 25000003 PHARM REV CODE 250: Mod: NTX | Performed by: PHYSICIAN ASSISTANT

## 2022-06-15 PROCEDURE — 82330 ASSAY OF CALCIUM: CPT | Mod: NTX

## 2022-06-15 PROCEDURE — 27000202 HC IABP, ADD'L DAY: Mod: NTX

## 2022-06-15 PROCEDURE — 82803 BLOOD GASES ANY COMBINATION: CPT | Mod: NTX

## 2022-06-15 PROCEDURE — 83735 ASSAY OF MAGNESIUM: CPT | Mod: NTX | Performed by: INTERNAL MEDICINE

## 2022-06-15 PROCEDURE — 99292 CRITICAL CARE ADDL 30 MIN: CPT | Mod: NTX,,, | Performed by: INTERNAL MEDICINE

## 2022-06-15 PROCEDURE — 99291 CRITICAL CARE FIRST HOUR: CPT | Mod: NTX,,, | Performed by: INTERNAL MEDICINE

## 2022-06-15 PROCEDURE — 27000221 HC OXYGEN, UP TO 24 HOURS: Mod: NTX

## 2022-06-15 PROCEDURE — 99291 PR CRITICAL CARE, E/M 30-74 MINUTES: ICD-10-PCS | Mod: NTX,,, | Performed by: INTERNAL MEDICINE

## 2022-06-15 PROCEDURE — 85730 THROMBOPLASTIN TIME PARTIAL: CPT | Mod: NTX | Performed by: INTERNAL MEDICINE

## 2022-06-15 PROCEDURE — 20000000 HC ICU ROOM: Mod: NTX

## 2022-06-15 PROCEDURE — 93010 ELECTROCARDIOGRAM REPORT: CPT | Mod: NTX,,, | Performed by: INTERNAL MEDICINE

## 2022-06-15 PROCEDURE — 84295 ASSAY OF SERUM SODIUM: CPT | Mod: NTX

## 2022-06-15 RX ORDER — METOLAZONE 5 MG/1
5 TABLET ORAL ONCE
Status: COMPLETED | OUTPATIENT
Start: 2022-06-16 | End: 2022-06-16

## 2022-06-15 RX ORDER — POTASSIUM CHLORIDE 29.8 MG/ML
40 INJECTION INTRAVENOUS ONCE
Status: COMPLETED | OUTPATIENT
Start: 2022-06-15 | End: 2022-06-15

## 2022-06-15 RX ORDER — POTASSIUM CHLORIDE 20 MEQ/1
40 TABLET, EXTENDED RELEASE ORAL ONCE
Status: COMPLETED | OUTPATIENT
Start: 2022-06-15 | End: 2022-06-15

## 2022-06-15 RX ORDER — CYCLOBENZAPRINE HCL 5 MG
10 TABLET ORAL 3 TIMES DAILY PRN
Status: DISCONTINUED | OUTPATIENT
Start: 2022-06-15 | End: 2022-06-15

## 2022-06-15 RX ORDER — POTASSIUM CHLORIDE 750 MG/1
30 CAPSULE, EXTENDED RELEASE ORAL ONCE
Status: COMPLETED | OUTPATIENT
Start: 2022-06-15 | End: 2022-06-15

## 2022-06-15 RX ORDER — TRAMADOL HYDROCHLORIDE 50 MG/1
100 TABLET ORAL EVERY 6 HOURS PRN
Status: DISCONTINUED | OUTPATIENT
Start: 2022-06-15 | End: 2022-06-16

## 2022-06-15 RX ORDER — METHOCARBAMOL 750 MG/1
1500 TABLET, FILM COATED ORAL 3 TIMES DAILY
Status: DISCONTINUED | OUTPATIENT
Start: 2022-06-15 | End: 2022-06-15

## 2022-06-15 RX ORDER — CYCLOBENZAPRINE HCL 5 MG
5 TABLET ORAL 3 TIMES DAILY PRN
Status: DISCONTINUED | OUTPATIENT
Start: 2022-06-15 | End: 2022-06-16

## 2022-06-15 RX ADMIN — INSULIN ASPART 2 UNITS: 100 INJECTION, SOLUTION INTRAVENOUS; SUBCUTANEOUS at 08:06

## 2022-06-15 RX ADMIN — CYCLOBENZAPRINE HYDROCHLORIDE 5 MG: 5 TABLET, FILM COATED ORAL at 11:06

## 2022-06-15 RX ADMIN — CYCLOBENZAPRINE HYDROCHLORIDE 5 MG: 5 TABLET, FILM COATED ORAL at 10:06

## 2022-06-15 RX ADMIN — OXYCODONE HYDROCHLORIDE AND ACETAMINOPHEN 1 TABLET: 5; 325 TABLET ORAL at 01:06

## 2022-06-15 RX ADMIN — FUROSEMIDE 40 MG/HR: 10 INJECTION, SOLUTION INTRAMUSCULAR; INTRAVENOUS at 04:06

## 2022-06-15 RX ADMIN — MILRINONE LACTATE IN DEXTROSE 0.38 MCG/KG/MIN: 200 INJECTION, SOLUTION INTRAVENOUS at 12:06

## 2022-06-15 RX ADMIN — POTASSIUM CHLORIDE 30 MEQ: 10 CAPSULE, COATED, EXTENDED RELEASE ORAL at 04:06

## 2022-06-15 RX ADMIN — POTASSIUM CHLORIDE 40 MEQ: 1500 TABLET, EXTENDED RELEASE ORAL at 09:06

## 2022-06-15 RX ADMIN — PANTOPRAZOLE SODIUM 40 MG: 40 TABLET, DELAYED RELEASE ORAL at 08:06

## 2022-06-15 RX ADMIN — MUPIROCIN: 20 OINTMENT TOPICAL at 09:06

## 2022-06-15 RX ADMIN — MIRTAZAPINE 15 MG: 15 TABLET, FILM COATED ORAL at 09:06

## 2022-06-15 RX ADMIN — POTASSIUM CHLORIDE 40 MEQ: 1500 TABLET, EXTENDED RELEASE ORAL at 05:06

## 2022-06-15 RX ADMIN — HEPARIN SODIUM 21 UNITS/KG/HR: 5000 INJECTION INTRAVENOUS; SUBCUTANEOUS at 07:06

## 2022-06-15 RX ADMIN — METHOCARBAMOL 1500 MG: 750 TABLET ORAL at 09:06

## 2022-06-15 RX ADMIN — HEPARIN SODIUM 16 UNITS/KG/HR: 5000 INJECTION INTRAVENOUS; SUBCUTANEOUS at 01:06

## 2022-06-15 RX ADMIN — POTASSIUM CHLORIDE 40 MEQ: 400 INJECTION, SOLUTION INTRAVENOUS at 11:06

## 2022-06-15 RX ADMIN — ASPIRIN 81 MG CHEWABLE TABLET 81 MG: 81 TABLET CHEWABLE at 08:06

## 2022-06-15 RX ADMIN — OXYCODONE HYDROCHLORIDE AND ACETAMINOPHEN 1 TABLET: 5; 325 TABLET ORAL at 02:06

## 2022-06-15 RX ADMIN — BUSPIRONE HYDROCHLORIDE 7.5 MG: 5 TABLET ORAL at 09:06

## 2022-06-15 RX ADMIN — TRAMADOL HYDROCHLORIDE 100 MG: 50 TABLET, COATED ORAL at 04:06

## 2022-06-15 RX ADMIN — TRAMADOL HYDROCHLORIDE 100 MG: 50 TABLET, COATED ORAL at 10:06

## 2022-06-15 RX ADMIN — Medication 400 MG: at 08:06

## 2022-06-15 RX ADMIN — Medication 400 MG: at 09:06

## 2022-06-15 RX ADMIN — SUCRALFATE 1 G: 1 TABLET ORAL at 09:06

## 2022-06-15 RX ADMIN — OXYCODONE HYDROCHLORIDE AND ACETAMINOPHEN 1 TABLET: 5; 325 TABLET ORAL at 08:06

## 2022-06-15 RX ADMIN — OXYCODONE HYDROCHLORIDE AND ACETAMINOPHEN 1 TABLET: 5; 325 TABLET ORAL at 04:06

## 2022-06-15 RX ADMIN — CYCLOBENZAPRINE HYDROCHLORIDE 5 MG: 5 TABLET, FILM COATED ORAL at 06:06

## 2022-06-15 RX ADMIN — MILRINONE LACTATE IN DEXTROSE 0.38 MCG/KG/MIN: 200 INJECTION, SOLUTION INTRAVENOUS at 07:06

## 2022-06-15 RX ADMIN — EPINEPHRINE 0.02 MCG/KG/MIN: 1 INJECTION INTRAMUSCULAR; INTRAVENOUS; SUBCUTANEOUS at 04:06

## 2022-06-15 RX ADMIN — POTASSIUM CHLORIDE 40 MEQ: 1500 TABLET, EXTENDED RELEASE ORAL at 08:06

## 2022-06-15 RX ADMIN — INSULIN ASPART 2 UNITS: 100 INJECTION, SOLUTION INTRAVENOUS; SUBCUTANEOUS at 05:06

## 2022-06-15 RX ADMIN — INSULIN ASPART 3 UNITS: 100 INJECTION, SOLUTION INTRAVENOUS; SUBCUTANEOUS at 08:06

## 2022-06-15 NOTE — SUBJECTIVE & OBJECTIVE
Interval History: Given metolazone 2.5 mg overnight. CVP remains elevated at 19 this morning but is diuresing 300-600 cc/hr. Net -ve 5.7 L/24 hours. CVP 14 this afternoon at recheck. Continue Lasix gtt at 40 mg/hr. SVO2 57%, CO 6.1, CI 2.6,  with IABP 1:1, epi 0.02, milrinone 0.375, and Marcella 10 ppm. sCr downtrending to 1.4. Reports diffuse muscle cramping, will add muscle relaxant to Percocet.         Continuous Infusions:   sodium chloride 0.9% Stopped (06/15/22 1122)    EPINEPHrine 0.02 mcg/kg/min (06/15/22 1300)    furosemide (LASIX) 10 mg/mL infusion (non-titrating) 40 mg/hr (06/15/22 1300)    heparin (porcine) in D5W 20 Units/kg/hr (06/15/22 1300)    milrinone 20mg/100ml D5W (200mcg/ml) 0.375 mcg/kg/min (06/15/22 1300)    nitric oxide gas       Scheduled Meds:   aspirin  81 mg Oral Daily    busPIRone  7.5 mg Oral Q12H    magnesium oxide  400 mg Oral BID    mirtazapine  15 mg Oral Nightly    mupirocin   Nasal BID    pantoprazole  40 mg Oral Daily    polyethylene glycol  17 g Oral Daily    potassium chloride SA  40 mEq Oral BID    senna-docusate 8.6-50 mg  1 tablet Oral Daily    sucralfate  1 g Oral Nightly     PRN Meds:acetaminophen, cyclobenzaprine, dextrose 10%, dextrose 10%, glucagon (human recombinant), glucose, glucose, insulin aspart U-100, magnesium oxide, magnesium oxide, oxyCODONE-acetaminophen, potassium bicarbonate, potassium bicarbonate, potassium bicarbonate, potassium, sodium phosphates, potassium, sodium phosphates, potassium, sodium phosphates, promethazine, sodium chloride 0.9%    Review of patient's allergies indicates:   Allergen Reactions    Bumex [bumetanide] Hives    Lactose Diarrhea     Other reaction(s): Abdominal distension, gaseous    Torsemide Hives     Objective:     Vital Signs (Most Recent):  Temp: 98.1 °F (36.7 °C) (06/15/22 1104)  Pulse: 102 (06/15/22 1200)  Resp: 19 (06/15/22 1200)  BP: 123/84 (06/15/22 1200)  SpO2: 98 % (06/15/22 1200)   Vital Signs (24h Range):  Temp:   [97 °F (36.1 °C)-98.1 °F (36.7 °C)] 98.1 °F (36.7 °C)  Pulse:  [] 102  Resp:  [10-61] 19  SpO2:  [98 %-100 %] 98 %  BP: (109-148)/(63-88) 123/84     Patient Vitals for the past 72 hrs (Last 3 readings):   Weight   06/15/22 0300 102.5 kg (225 lb 15.5 oz)   06/14/22 0905 100.7 kg (222 lb)   06/14/22 0030 101.1 kg (222 lb 14.2 oz)       Body mass index is 28.24 kg/m².      Intake/Output Summary (Last 24 hours) at 6/15/2022 1310  Last data filed at 6/15/2022 1300  Gross per 24 hour   Intake 2715.8 ml   Output 36245 ml   Net -7604.2 ml         Hemodynamic Parameters:       Telemetry: ST    Physical Exam  Constitutional:       Appearance: Normal appearance.   HENT:      Head: Normocephalic and atraumatic.   Eyes:      Conjunctiva/sclera: Conjunctivae normal.      Pupils: Pupils are equal, round, and reactive to light.   Neck:      Comments: RIJ TLC  Cardiovascular:      Rate and Rhythm: Regular rhythm. Tachycardia present.      Comments: +S3  IABP to R fem  Pulmonary:      Effort: Pulmonary effort is normal.      Breath sounds: Normal breath sounds.   Abdominal:      General: Bowel sounds are normal. There is distension.   Musculoskeletal:         General: Normal range of motion.      Cervical back: Neck supple.      Comments: Trace TREVER   Skin:     General: Skin is warm and dry.      Capillary Refill: Capillary refill takes 2 to 3 seconds.   Neurological:      General: No focal deficit present.      Mental Status: He is alert and oriented to person, place, and time.   Psychiatric:         Mood and Affect: Mood normal.         Behavior: Behavior normal.         Thought Content: Thought content normal.         Judgment: Judgment normal.       Significant Labs:  CBC:  Recent Labs   Lab 06/13/22  1352 06/14/22  0348 06/15/22  0323 06/15/22  1104   WBC 8.21 7.08 9.34  --    RBC 3.39* 3.30* 3.42*  --    HGB 10.4* 10.1* 10.4*  --    HCT 31.0* 30.4* 31.5* 36    228 257  --    MCV 91 92 92  --    MCH 30.7 30.6 30.4   --    MCHC 33.5 33.2 33.0  --        BNP:  Recent Labs   Lab 06/13/22  0901   BNP 1,701*       CMP:  Recent Labs   Lab 06/13/22  0901 06/13/22  1748 06/14/22  0348 06/14/22  1213 06/14/22  2041 06/15/22  0323   *   < > 234* 220* 274* 191*   CALCIUM 9.1   < > 8.6* 8.6* 8.4* 9.0   ALBUMIN 3.6  --  3.5  --   --  3.7   PROT 7.2  --  6.5  --   --  7.1   *   < > 133* 136 133* 134*   K 3.5   < > 3.9 3.6 3.9 3.8   CO2 18*   < > 24 25 25 28      < > 98 97 94* 94*   BUN 19   < > 17 17 15 15   CREATININE 1.8*   < > 1.8* 1.5* 1.6* 1.4   ALKPHOS 81  --  79  --   --  81   ALT 19  --  24  --   --  25   AST 33  --  31  --   --  28   BILITOT 1.1*  --  1.1*  --   --  1.1*    < > = values in this interval not displayed.        Coagulation:   Recent Labs   Lab 06/13/22  2150 06/14/22  0549 06/14/22  1643 06/15/22  0132 06/15/22  0935   INR 1.2  --   --   --   --    APTT 26.8   < > 30.8 30.2 30.3    < > = values in this interval not displayed.       LDH:  No results for input(s): LDH in the last 72 hours.  Microbiology:  Microbiology Results (last 7 days)       ** No results found for the last 168 hours. **            I have reviewed all pertinent labs within the past 24 hours.    Estimated Creatinine Clearance: 93.7 mL/min (based on SCr of 1.4 mg/dL).    Diagnostic Results:  I have reviewed and interpreted all pertinent imaging results/findings within the past 24 hours.

## 2022-06-15 NOTE — PROGRESS NOTES
06/15/2022  John Alaniz    Current provider:  Josh Pulido MD    Device interrogation:  No flowsheet data found.       Rounded on Kevan Bernice to ensure all mechanical assist device settings (IABP or VAD) were appropriate and all parameters were within limits.  I was able to ensure all back up equipment was present, the staff had no issues, and the Perfusion Department daily rounding was complete.      For implantable VADs: Interrogation of Ventricular assist device was performed with analysis of device parameters and review of device function. I have personally reviewed the interrogation findings and agree with findings as stated.     In emergency, the nursing units have been notified to contact the perfusion department either by:  Calling [id number] from 630am to 4pm Mon thru Fri, utilizing the On-Call Finder functionality of Epic and searching for Perfusion, or by contacting the hospital  from 4pm to 630am and on weekends and asking to speak with the perfusionist on call.    4:43 PM

## 2022-06-15 NOTE — PROGRESS NOTES
Admit Note     Met with patient and significant other (sleeping)  to assess needs. Patient is a 37 y.o. single male, admitted for acute on chronic combined systolic and diastolic heart failure, NYHA class 4, uncontrolled diabetes mellitus and chronic kidney disease stage 3. Pt reports he hopes to receive an LVAD this admission.          Patient admitted to [facility]  on 6/13/2022 .  At this time, patient presents as alert and oriented x 4, good eye contact, calm and communicative.  At this time, patients caregiver presents as sleeping.  Pt reports his mother will visit today.     Household/Family Systems     Patient resides with his significant other and three minor children at the pt's mothers house located at:      [address] [place] [address].        Support system includes significant other, mother and minor children.  Pt's children in the home are cared for by family when pt/significant other are in the hospital.     The pt has seven children, two with the pt's significant other.   The pt's other children live with the their mothers.     Pt reports his significant other Niharika will be the primary caregiver for LVAD.      Patients primary caregiver is self with support of significant other.      Pt's cell: [phone]    Emergency contacts:   Niharika Wright (significant other, drives) [phone]  Malou Dumont (mother, drives) [phone]    During admission, patient's caregiver plans to stay in patient's room.  Confirmed patient and patients caregivers do have access to reliable transportation.  Pt and significant other drive.     Cognitive Status/Learning     Patient reports reading ability as college and states patient does not have difficulty with reading, writing, hearing, comprehension, learning and memory.   Pt does have difficulty with eyesight due to diabetic issues.    Patient reports patient learns best by multiple methods.     Needed: No.   Highest education level: Attended  College/Technical School    Vocation/Disability   .  Working for Income: No  If no, reason not working: Demands of Treatment  Patient has applied for disability and has a  assisting with same.   Pt used to work as a CNA.   Pt's spouse is a homemaker.   Pt's mother receives a monthly income and assists pt with financial needs.     Adherence     Patient reports a high level of adherence to patients health care regimen.  Adherence counseling and education provided. Patient verbalizes understanding.    Substance Use    Patient reports the following substance usage.    Tobacco: no current use.  Pt started smoking at age 18 and quit on April 12th 2022.   Pt used to smoke from .5 to 1ppd.   Alcohol: none, patient denies any use.  Illicit Drugs/Non-prescribed Medications: no current use.  Pt reports he stopped using Marijuana on 11/2021, cocaine 7/2021 and ecstasy on 9/2020.    Patient states clear understanding of the potential impact of substance use.  Substance abstinence/cessation counseling, education and resources provided and reviewed.     Services Utilizing/ADLS    Infusion Service: Prior to admission, patient utilizing? yes, [facility] full service.  Pt would like to use the same IV company if needed when discharged.  [phone], [phone]  Home Health: Prior to admission, patient utilizing? no  DME: Prior to admission, no  Pulmonary/Cardiac Rehab: Prior to admission, no  Dialysis:  Prior to admission, no  Transplant Specialty Pharmacy:  Prior to admission, no.    Prior to admission, patient reports patient was somewhat  independent with ADLS.  Pt reports due to his shoulder, he requires assistance for dressing and bathing.  Pt and significant other drive.   Patient reports patient is not able to care for self at this time due to compromised medical condition (as documented in medical record) and physical weakness..  Patient indicates a willingness to care for self once medically cleared to  do so.    Insurance/Medications    Insured by   Payer/Plan Subscr  Sex Relation Sub. Ins. ID Effective Group Num   1. MEDICAID - HE* JOSE J DAMICO 1985 Male Self [id number] 3/1/20 [id number]                                   [address] [address]      Primary Insurance (for UNOS reporting): Public Insurance - Medicaid  Secondary Insurance (for UNOS reporting): None    Patient reports patient is able to obtain and afford medications at this time and at time of discharge.    Living Will/Healthcare Power of     Patient states patient does not have a LW and/or HCPA. Pt reports he would like his mother to be his HCPA, worker encouraged pt to complete a HCPA.  provided education regarding LW and HCPA and the completion of forms.    Coping/Mental Health    Patient is coping adequately with the aid of  family members. Patient indicates mental health difficulties.   Pt reports a history of depression, anxiety and anger and is taking Buspar and Lexapro. Pt is not involved in out pt counseling.   Pt reports he utilizes music and nature to help him relax.   Pt reports this admission/hospital stay has been stressful due to his current medical status.  Pt reports he hopes to get an LVAD this admission.    Worker provided support.   No additional needs reported.       Discharge Planning    At time of discharge, patient plans to return to patient's home under the care of self, significant other and mother.  Patients family will transport patient.  Per rounds today, expected discharge date has not been medically determined at this time. Patient and patients caregiver  verbalize understanding and are involved in treatment planning and discharge process.    Additional Concerns    Patient is being followed for needs, education, resources, information, emotional support, supportive counseling, and for supportive and skilled discharge plan of care.  providing ongoing psychosocial support, education,  resources and d/c planning as needed.  SW remains available. Patient verbalizes understanding and agreement with information reviewed, social work availability, and how to access available resources as needed.

## 2022-06-15 NOTE — PROGRESS NOTES
[facility] - Cardiac Intensive Care  Heart Transplant  Progress Note    Patient Name: Kevan Queen  MRN: [id number]  Admission Date: 6/13/2022  Hospital Length of Stay: 2 days  Attending Physician: Josh Pulido MD  Primary Care Provider: ORALIA Cline  Principal Problem:Acute on chronic combined systolic and diastolic heart failure, NYHA class 4    Subjective:     Interval History: Given metolazone 2.5 mg overnight. CVP remains elevated at 19 this morning but is diuresing 300-600 cc/hr. Net -ve 5.7 L/24 hours. CVP 14 this afternoon at recheck. Continue Lasix gtt at 40 mg/hr. SVO2 57%, CO 6.1, CI 2.6,  with IABP 1:1, epi 0.02, milrinone 0.375, and Marcella 10 ppm. sCr downtrending to 1.4. Reports diffuse muscle cramping, will add muscle relaxant to Percocet.         Continuous Infusions:   sodium chloride 0.9% Stopped (06/15/22 1122)    EPINEPHrine 0.02 mcg/kg/min (06/15/22 1300)    furosemide (LASIX) 10 mg/mL infusion (non-titrating) 40 mg/hr (06/15/22 1300)    heparin (porcine) in D5W 20 Units/kg/hr (06/15/22 1300)    milrinone 20mg/100ml D5W (200mcg/ml) 0.375 mcg/kg/min (06/15/22 1300)    nitric oxide gas       Scheduled Meds:   aspirin  81 mg Oral Daily    busPIRone  7.5 mg Oral Q12H    magnesium oxide  400 mg Oral BID    mirtazapine  15 mg Oral Nightly    mupirocin   Nasal BID    pantoprazole  40 mg Oral Daily    polyethylene glycol  17 g Oral Daily    potassium chloride SA  40 mEq Oral BID    senna-docusate 8.6-50 mg  1 tablet Oral Daily    sucralfate  1 g Oral Nightly     PRN Meds:acetaminophen, cyclobenzaprine, dextrose 10%, dextrose 10%, glucagon (human recombinant), glucose, glucose, insulin aspart U-100, magnesium oxide, magnesium oxide, oxyCODONE-acetaminophen, potassium bicarbonate, potassium bicarbonate, potassium bicarbonate, potassium, sodium phosphates, potassium, sodium phosphates, potassium, sodium phosphates, promethazine, sodium chloride 0.9%    Review of patient's  allergies indicates:   Allergen Reactions    Bumex [bumetanide] Hives    Lactose Diarrhea     Other reaction(s): Abdominal distension, gaseous    Torsemide Hives     Objective:     Vital Signs (Most Recent):  Temp: 98.1 °F (36.7 °C) (06/15/22 1104)  Pulse: 102 (06/15/22 1200)  Resp: 19 (06/15/22 1200)  BP: 123/84 (06/15/22 1200)  SpO2: 98 % (06/15/22 1200)   Vital Signs (24h Range):  Temp:  [97 °F (36.1 °C)-98.1 °F (36.7 °C)] 98.1 °F (36.7 °C)  Pulse:  [] 102  Resp:  [10-61] 19  SpO2:  [98 %-100 %] 98 %  BP: (109-148)/(63-88) 123/84     Patient Vitals for the past 72 hrs (Last 3 readings):   Weight   06/15/22 0300 102.5 kg (225 lb 15.5 oz)   06/14/22 0905 100.7 kg (222 lb)   06/14/22 0030 101.1 kg (222 lb 14.2 oz)       Body mass index is 28.24 kg/m².      Intake/Output Summary (Last 24 hours) at 6/15/2022 1310  Last data filed at 6/15/2022 1300  Gross per 24 hour   Intake 2715.8 ml   Output 82467 ml   Net -7604.2 ml         Hemodynamic Parameters:       Telemetry: ST    Physical Exam  Constitutional:       Appearance: Normal appearance.   HENT:      Head: Normocephalic and atraumatic.   Eyes:      Conjunctiva/sclera: Conjunctivae normal.      Pupils: Pupils are equal, round, and reactive to light.   Neck:      Comments: RIJ TLC  Cardiovascular:      Rate and Rhythm: Regular rhythm. Tachycardia present.      Comments: +S3  IABP to R fem  Pulmonary:      Effort: Pulmonary effort is normal.      Breath sounds: Normal breath sounds.   Abdominal:      General: Bowel sounds are normal. There is distension.   Musculoskeletal:         General: Normal range of motion.      Cervical back: Neck supple.      Comments: Trace TREVER   Skin:     General: Skin is warm and dry.      Capillary Refill: Capillary refill takes 2 to 3 seconds.   Neurological:      General: No focal deficit present.      Mental Status: He is alert and oriented to person, place, and time.   Psychiatric:         Mood and Affect: Mood normal.          Behavior: Behavior normal.         Thought Content: Thought content normal.         Judgment: Judgment normal.       Significant Labs:  CBC:  Recent Labs   Lab 06/13/22  1352 06/14/22  0348 06/15/22  0323 06/15/22  1104   WBC 8.21 7.08 9.34  --    RBC 3.39* 3.30* 3.42*  --    HGB 10.4* 10.1* 10.4*  --    HCT 31.0* 30.4* 31.5* 36    228 257  --    MCV 91 92 92  --    MCH 30.7 30.6 30.4  --    MCHC 33.5 33.2 33.0  --        BNP:  Recent Labs   Lab 06/13/22  0901   BNP 1,701*       CMP:  Recent Labs   Lab 06/13/22  0901 06/13/22  1748 06/14/22  0348 06/14/22  1213 06/14/22  2041 06/15/22  0323   *   < > 234* 220* 274* 191*   CALCIUM 9.1   < > 8.6* 8.6* 8.4* 9.0   ALBUMIN 3.6  --  3.5  --   --  3.7   PROT 7.2  --  6.5  --   --  7.1   *   < > 133* 136 133* 134*   K 3.5   < > 3.9 3.6 3.9 3.8   CO2 18*   < > 24 25 25 28      < > 98 97 94* 94*   BUN 19   < > 17 17 15 15   CREATININE 1.8*   < > 1.8* 1.5* 1.6* 1.4   ALKPHOS 81  --  79  --   --  81   ALT 19  --  24  --   --  25   AST 33  --  31  --   --  28   BILITOT 1.1*  --  1.1*  --   --  1.1*    < > = values in this interval not displayed.        Coagulation:   Recent Labs   Lab 06/13/22  2150 06/14/22  0549 06/14/22  1643 06/15/22  0132 06/15/22  0935   INR 1.2  --   --   --   --    APTT 26.8   < > 30.8 30.2 30.3    < > = values in this interval not displayed.       LDH:  No results for input(s): LDH in the last 72 hours.  Microbiology:  Microbiology Results (last 7 days)       ** No results found for the last 168 hours. **            I have reviewed all pertinent labs within the past 24 hours.    Estimated Creatinine Clearance: 93.7 mL/min (based on SCr of 1.4 mg/dL).    Diagnostic Results:  I have reviewed and interpreted all pertinent imaging results/findings within the past 24 hours.    Assessment and Plan:     36 yo male with NIDCM with BiV systolic heart failure, on home Milrinone at 0.375 mcg/kg/min, presented at [facility] 4/2/22 ,was not  "evaluated for OHT as he has recently quit smoking in April 2022 but was approved for VAD with plan to begin OHT evaluation in upcoming months if Mr Queen is stable and suitable for OHT eval (blood group A), 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 Went to ED at [facility] 6/10 but left after waiting 4 hours. Had clinic appointment with us today, but arrived to [facility] early this morning and decided to go to the ED instead. Baseline Lasix dose is 80 mg bid. Reports taking 240 mg qd past 3 days with no improvement. BNP is 1701, up from 898 on 6/2 and 49 on 5/24. sCr is 1.8 with baseline ~ 1.5-1.7. sPO2 on RA is 93%. Wife at bedside    He has been given Lasix 80 mg IVP in the ED with plan to start Lasix gtt at 20 mg/hr           * Acute on chronic combined systolic and diastolic heart failure, NYHA class 4  -[facility]  -Approved for LVAD at [facility] on 4/2/22. Was not evaluated for OHTx as he quit smoking in April 2022  -Moved to ICU 6/13 in the setting of significant volume overloaded and low output state. IABP placed 6/13, Lasix increased to 40 mg/hr and Epi and Marcella added to Primacor. sVO2 has improved at 51 with CO/CI 5.29/2.31 and   -CVP trending down at 19, net -ve 5.7 L/24 hours after receiving metolazone 2.5 overnight in addition to IV Lasix 40 mg/hr. Repeat CVP 14 this afternoon, continue with IV lasix gtt.   -SVO2 57%, CO 6.1, CI 2.6,  with IABP 1:1, IV milrinone 0.375, epi 0.02, and Marcella 10 ppm.   -TTE 6/14: LVEF 10%, G3DD, mild-moderate reduced RVSF, moderate-severe MR, moderate-sever tr, CVP 15, PASP 56, moderate PH, LVEDD 7.59  -RHC done 4/19/22 on Milrinone 0.5 mcg/kg/min: " RA 11, PA 50/27 (35), W 27, CO/CI 3.7/1.7, PVR 2.2 and SVR 1535   -GDMT: Entresto and Aldactone on hold since admit 5/24-5/27 2/2 elevated sCr  -Reports dry weight is 217#, and that he had gained 6# on his home scale  -May need LVAD this admit. Unlikely OHTx candidate with smoking history (quit April 2022)           Muscle cramping  -Continue Percocet with muscle relaxant (EKG checked for QTC prolongation)   -Consider TCA if not improved    Uncontrolled diabetes mellitus  -Admitted 5/24-5/27 with hyperglycemia hyperosmolar syndrome  -Hgb A1C 9.2 on 5/20/22  -Glucose per labs today 169  -SSI for now    CKD (chronic kidney disease) stage 3, GFR 30-59 ml/min  -Admit sCr 1.8, baseline ~ 1.5-1.7  -Improved   -Monitor with diuresis    Cardiogenic shock  -See A/C CHF      Uninterrupted Critical Care/Counseling Time (not including procedures): 70 minutes      Denise Espinoza, PA-C  Heart Transplant  [facility] - Cardiac Intensive Care

## 2022-06-15 NOTE — PLAN OF CARE
Problem: Physical Therapy  Goal: Physical Therapy Goal  Description: Goals to be met by: 2022     Patient will increase functional independence with mobility by performin. Lower extremity exercise program without IABP 30 reps per handout, with independence  2.Upper extremity exercise program 30 reps per handout, with independence      Outcome: Ongoing, Progressing   Eval Completed. Goals Appropriate

## 2022-06-15 NOTE — PLAN OF CARE
CICU DAILY GOALS       A: Awake    RASS: Goal - RASS Goal: 0-->alert and calm  Actual - RASS (Diggs Agitation-Sedation Scale): 0-->alert and calm   Restraint necessity:    B: Breath   SBT: Not intubated   C: Coordinate A & B, analgesics/sedatives   Pain: managed    SAT: Not intubated  D: Delirium   CAM-ICU: Overall CAM-ICU: Negative  E: Early(intubated/ Progressive (non-intubated) Mobility   MOVE Screen: Fail   Activity: Activity Management: Ankle pumps - L1, Arm raise - L1  FAS: Feeding/Nutrition   Diet order: Diet/Nutrition Received: 2 gram sodium, low saturated fat/low cholesterol,   Fluid restriction: Fluid Requirement: 1500cc FR  T: Thrombus   DVT prophylaxis: VTE Required Core Measure: Pharmacological prophylaxis initiated/maintained  H: HOB Elevation   Head of Bed (HOB) Positioning: HOB flat  U: Ulcer Prophylaxis   GI: yes  G: Glucose control   managed Glycemic Management: blood glucose monitored  S: Skin   Bundle compliance: yes   Bathing/Skin Care: bath, complete, linen changed Date: 6/14/22  B: Bowel Function   no issues   I: Indwelling Catheters   Cartwright necessity:  No   CVC necessity: Yes   IPAD offered: Not appropriate  D: De-escalation Antibx   No  Plan for the day   Continue to monitor hemodynamics via RIJ TLC. IABP remains 1:1, tolerating well. CVP remains elevated, though improving; 18, 19, 17 today. Svo2 58. Wife remains at bedside, updated on POC.  Family/Goals of care/Code Status   Code Status: Full Code     No acute events throughout day, VS and assessment per flow sheet, patient progressing towards goals as tolerated, plan of care reviewed with Kevan Queen and family, all concerns addressed, will continue to monitor.

## 2022-06-15 NOTE — PT/OT/SLP PROGRESS
Occupational Therapy      Patient Name:  Kevan Queen   MRN:  [id number]    Pt not seen this date as he has femoral IABP. Pt on strict bed rest and appropriate for one disciple at this time. PT to follow at this time and notify OT when appropriate for progression with functional mobility/ADL skills.   6/15/2022

## 2022-06-15 NOTE — ASSESSMENT & PLAN NOTE
-[facility]  -Approved for LVAD at [facility] on 4/2/22. Was not evaluated for OHTx as he quit smoking in April 2022  -Moved to ICU 6/13 in the setting of significant volume overloaded and low output state. IABP placed 6/13, Lasix increased to 40 mg/hr and Epi and Marcella added to Primacor. sVO2 has improved at 51 with CO/CI 5.29/2.31 and   -CVP trending down at 19, net -ve 5.7 L/24 hours after receiving metolazone 2.5 overnight in addition to IV Lasix 40 mg/hr. Repeat CVP 14 this afternoon, continue with IV lasix gtt.   -SVO2 57%, CO 6.1, CI 2.6,  with IABP 1:1, IV milrinone 0.375, epi 0.02, and Marcella 10 ppm.   -TTE 6/14: LVEF 10%, G3DD, mild-moderate reduced RVSF, moderate-severe MR, moderate-sever tr, CVP 15, PASP 56, moderate PH, LVEDD 7.59  -RHC done 4/19/22 on Milrinone 0.5 mcg/kg/min: RA 11, PA 50/27 (35), W 27, CO/CI 3.7/1.7, PVR 2.2 and SVR 1535   -GDMT: Entresto and Aldactone on hold since admit 5/24-5/27 2/2 elevated sCr  -Reports dry weight is 217#, and that he had gained 6# on his home scale  -May need LVAD this admit. Unlikely OHTx candidate with smoking history (quit April 2022)

## 2022-06-15 NOTE — PT/OT/SLP EVAL
Physical Therapy Evaluation    Patient Name:  Kevan Queen   MRN:  [id number]  Admit Date: 6/13/2022  Admitting Diagnosis:  Acute on chronic combined systolic and diastolic heart failure, NYHA class 4  Length of Stay: 2 days  Recent Surgery: * No surgery found *      Recommendations:     Discharge Recommendations:  home   Discharge Equipment Recommendations: none   Barriers to discharge: None    Assessment:     Kevan Queen is a 37 y.o. male admitted with a medical diagnosis of Acute on chronic combined systolic and diastolic heart failure, NYHA class 4.  Patient found alert and cooperative with therapy. Patient's vitals remained stable throughout evaluation. Patient able to per from UE ROM and strength testing with deficits in ROM   Due to L frozen shoulder. Patient declined L LE ROM assessment. Patient's upper extremity strength is 5/5 and he is motivated to progress. Patient is appropriate to return home upon hospital discharge.     Problem List: impaired endurance, impaired self care skills, decreased upper extremity function, pain, impaired cardiopulmonary response to activity  Rehab Prognosis: Good; patient would benefit from acute skilled PT services to address these deficits and reach maximum level of function.      Plan:     During this hospitalization, patient to be seen 1 x/week to address the identified rehab impairments via gait training, therapeutic activities, therapeutic exercises, neuromuscular re-education and progress towards the established goals.    · Plan of Care Expires:  07/14/22    Subjective   Communicated with RN prior to session.  Patient found HOB elevated upon PT entry to room, agreeable to evaluation. Kevan Queen's wife present during session.    Chief Complaint: Chest Pain (Chf on primacor drip, )    Patient/Family Comments/goals: to return to PLOF   Pain/Comfort:  · Pain Rating 1: 10/10 (due to frozen shoulder)  · Location 1: arm  · Pain Addressed 1: Reposition, Distraction  · Pain  "Rating Post-Intervention 1: 10/10    Living Environment:  Patient lives with wife in a two story home with no MILY. Patient able to stay on first floor.     Prior Level of Function:   Patient reports being IND with mobility & with ADLs. Patient uses DME as follows: none.    Patient reports they will have assistance from wife and mother  upon discharge.    Objective:   Patient found with: PICC line, central line, peripheral IV, Condom Catheter, telemetry, blood pressure cuff, pulse ox (continuous)     General Precautions: Standard, Cardiac fall (R Femoral IABP)   Orthopedic Precautions:N/A   Braces: N/A   Oxygen Device: Nasal Cannula  Vitals: /84 (BP Location: Left arm, Patient Position: Lying)   Pulse 102   Temp 98.1 °F (36.7 °C) (Oral)   Resp 19   Ht 6' 3" (1.905 m)   Wt 102.5 kg (225 lb 15.5 oz)   SpO2 98%   BMI 28.24 kg/m²     Exams:  · Cognition:   · Alert and Cooperative  · AxOx4  · Command following: Follows multistep  commands  · Fluency: clear/fluent  · Hearing: Intact  · Vision:  Intact visual fields    · RUE ROM: WFL  · RUE Strength: grossly 5/5   · LUE ROM: WFL   · 120 degrees of shoulder flexion   · LUE Strength: grossly 5/5   · L Lower Extremity ROM and strength deferred due to patient declining        Outcome Measures:  AM-PAC 6 CLICK MOBILITY  Turning over in bed (including adjusting bedclothes, sheets and blankets)?: 1 (unable to to R Femoral IABP)  Sitting down on and standing up from a chair with arms (e.g., wheelchair, bedside commode, etc.): 1 (unable to to R Femoral IABP)  Moving from lying on back to sitting on the side of the bed?: 1 (unable to to R Femoral IABP)  Moving to and from a bed to a chair (including a wheelchair)?: 1 (unable to to R Femoral IABP)  Need to walk in hospital room?: 1 (unable to to R Femoral IABP)  Climbing 3-5 steps with a railing?: 1 (unable to to R Femoral IABP)  Basic Mobility Total Score: 6     Functional Mobility:  Additional staff present: Supervising " PT     Functional Mobility not performed due to femoral IABP.     Therapeutic Activities, Exercises, & Education:   Patient educated on PT role and POC.  Patient educated on importance of daily bed level exercises.   Patient educated on femoral IABP precautions.   Patient verbalized understanding.       Patient left HOB elevated with all lines intact, call button in reach and RN notified.    GOALS:   Multidisciplinary Problems     Physical Therapy Goals        Problem: Physical Therapy    Goal Priority Disciplines Outcome Goal Variances Interventions   Physical Therapy Goal     PT, PT/OT Ongoing, Progressing     Description: Goals to be met by: 6/15/2022     Patient will increase functional independence with mobility by performin. Lower extremity exercise program 30 reps per handout, with independence  2. Lower extremity exercise program 30 reps per handout, with independence                       History:     Past Medical History:   Diagnosis Date    Arthritis     Cardiomyopathy     CHF (congestive heart failure) 10/01/2020    Diabetes mellitus     Dilated cardiomyopathy 10/26/2020    Drug abuse 10/2020    Hyperosmolar hyperglycemic state (HHS) 2022    ICD (implantable cardioverter-defibrillator) in place 10/26/2020    Renal disorder        Past Surgical History:   Procedure Laterality Date    CARDIAC DEFIBRILLATOR PLACEMENT      RIGHT HEART CATHETERIZATION Right 2022    Procedure: INSERTION, CATHETER, RIGHT HEART;  Surgeon: Luca Lopez Jr., MD;  Location: [facility] CATH [facility];  Service: Cardiology;  Laterality: Right;    RIGHT HEART CATHETERIZATION Right 2022    Procedure: INSERTION, CATHETER, RIGHT HEART;  Surgeon: Josh Pulido MD;  Location: [facility] CATH [facility];  Service: Cardiology;  Laterality: Right;       Time Tracking:     PT Received On: 06/15/22  PT Start Time: 1040     PT Stop Time: 1050  PT Total Time (min): 10 min     Billable Minutes: Evaluation 10

## 2022-06-15 NOTE — ASSESSMENT & PLAN NOTE
-Continue Percocet with muscle relaxant (EKG checked for QTC prolongation)   -Consider TCA if not improved

## 2022-06-15 NOTE — PROGRESS NOTES
Patient AAO in bed on IABP support, spouse sleeping at bedside. Patient seen for pre-VAD education. States he already has a packet and signed education acknowledgment, not noted in file. Will review chart. Patient states his dad had a VAD and heart transplant so he is familiar with the process. Left education packet and asked patient to review. Informed patient we would consult palliative care, states he sees palliative care in [place] but does not remember the name of the doctor, open to seeing our team while admitted. No further questions at this time.

## 2022-06-16 DIAGNOSIS — I42.0 DILATED CARDIOMYOPATHY: ICD-10-CM

## 2022-06-16 DIAGNOSIS — R57.0 CARDIOGENIC SHOCK: ICD-10-CM

## 2022-06-16 DIAGNOSIS — I50.43 ACUTE ON CHRONIC COMBINED SYSTOLIC AND DIASTOLIC HEART FAILURE, NYHA CLASS 4: Primary | ICD-10-CM

## 2022-06-16 PROBLEM — Z51.5 PALLIATIVE CARE ENCOUNTER: Status: ACTIVE | Noted: 2022-06-16

## 2022-06-16 LAB
ALBUMIN SERPL BCP-MCNC: 3.6 G/DL (ref 3.5–5.2)
ALLENS TEST: ABNORMAL
ALP SERPL-CCNC: 94 U/L (ref 55–135)
ALT SERPL W/O P-5'-P-CCNC: 23 U/L (ref 10–44)
ANION GAP SERPL CALC-SCNC: 12 MMOL/L (ref 8–16)
ANION GAP SERPL CALC-SCNC: 16 MMOL/L (ref 8–16)
APTT BLDCRRT: 41.2 SEC (ref 21–32)
APTT BLDCRRT: 44 SEC (ref 21–32)
AST SERPL-CCNC: 25 U/L (ref 10–40)
BASOPHILS # BLD AUTO: 0.03 K/UL (ref 0–0.2)
BASOPHILS NFR BLD: 0.3 % (ref 0–1.9)
BILIRUB SERPL-MCNC: 1.1 MG/DL (ref 0.1–1)
BUN SERPL-MCNC: 20 MG/DL (ref 6–20)
BUN SERPL-MCNC: 26 MG/DL (ref 6–20)
CALCIUM SERPL-MCNC: 10.3 MG/DL (ref 8.7–10.5)
CALCIUM SERPL-MCNC: 10.3 MG/DL (ref 8.7–10.5)
CHLORIDE SERPL-SCNC: 86 MMOL/L (ref 95–110)
CHLORIDE SERPL-SCNC: 91 MMOL/L (ref 95–110)
CO2 SERPL-SCNC: 31 MMOL/L (ref 23–29)
CO2 SERPL-SCNC: 32 MMOL/L (ref 23–29)
CREAT SERPL-MCNC: 1.9 MG/DL (ref 0.5–1.4)
CREAT SERPL-MCNC: 1.9 MG/DL (ref 0.5–1.4)
DELSYS: ABNORMAL
DELSYS: ABNORMAL
DIFFERENTIAL METHOD: ABNORMAL
EOSINOPHIL # BLD AUTO: 0.1 K/UL (ref 0–0.5)
EOSINOPHIL NFR BLD: 1.5 % (ref 0–8)
ERYTHROCYTE [DISTWIDTH] IN BLOOD BY AUTOMATED COUNT: 14.2 % (ref 11.5–14.5)
EST. GFR  (AFRICAN AMERICAN): 50.9 ML/MIN/1.73 M^2
EST. GFR  (AFRICAN AMERICAN): 50.9 ML/MIN/1.73 M^2
EST. GFR  (NON AFRICAN AMERICAN): 44.1 ML/MIN/1.73 M^2
EST. GFR  (NON AFRICAN AMERICAN): 44.1 ML/MIN/1.73 M^2
GLUCOSE SERPL-MCNC: 190 MG/DL (ref 70–110)
GLUCOSE SERPL-MCNC: 197 MG/DL (ref 70–110)
HCO3 UR-SCNC: 37.2 MMOL/L (ref 24–28)
HCO3 UR-SCNC: 37.3 MMOL/L (ref 24–28)
HCO3 UR-SCNC: 41.8 MMOL/L (ref 24–28)
HCT VFR BLD AUTO: 33.4 % (ref 40–54)
HGB BLD-MCNC: 11.2 G/DL (ref 14–18)
IMM GRANULOCYTES # BLD AUTO: 0.02 K/UL (ref 0–0.04)
IMM GRANULOCYTES NFR BLD AUTO: 0.2 % (ref 0–0.5)
LACTATE SERPL-SCNC: 1.1 MMOL/L (ref 0.5–2.2)
LYMPHOCYTES # BLD AUTO: 1.6 K/UL (ref 1–4.8)
LYMPHOCYTES NFR BLD: 17 % (ref 18–48)
MAGNESIUM SERPL-MCNC: 2.2 MG/DL (ref 1.6–2.6)
MCH RBC QN AUTO: 30.3 PG (ref 27–31)
MCHC RBC AUTO-ENTMCNC: 33.5 G/DL (ref 32–36)
MCV RBC AUTO: 90 FL (ref 82–98)
METHEMOGLOBIN: 0.2 % (ref 0–3)
MONOCYTES # BLD AUTO: 0.7 K/UL (ref 0.3–1)
MONOCYTES NFR BLD: 7.6 % (ref 4–15)
NEUTROPHILS # BLD AUTO: 7.1 K/UL (ref 1.8–7.7)
NEUTROPHILS NFR BLD: 73.4 % (ref 38–73)
NRBC BLD-RTO: 0 /100 WBC
PCO2 BLDA: 62.2 MMHG (ref 35–45)
PCO2 BLDA: 64.1 MMHG (ref 35–45)
PCO2 BLDA: 66.4 MMHG (ref 35–45)
PH SMN: 7.37 [PH] (ref 7.35–7.45)
PH SMN: 7.38 [PH] (ref 7.35–7.45)
PH SMN: 7.41 [PH] (ref 7.35–7.45)
PLATELET # BLD AUTO: 295 K/UL (ref 150–450)
PMV BLD AUTO: 10.4 FL (ref 9.2–12.9)
PO2 BLDA: 26 MMHG (ref 40–60)
PO2 BLDA: 29 MMHG (ref 40–60)
PO2 BLDA: 30 MMHG (ref 40–60)
POC BE: 12 MMOL/L
POC BE: 12 MMOL/L
POC BE: 17 MMOL/L
POC SATURATED O2: 44 % (ref 95–100)
POC SATURATED O2: 50 % (ref 95–100)
POC SATURATED O2: 55 % (ref 95–100)
POC TCO2: 39 MMOL/L (ref 24–29)
POC TCO2: 39 MMOL/L (ref 24–29)
POC TCO2: 44 MMOL/L (ref 24–29)
POCT GLUCOSE: 160 MG/DL (ref 70–110)
POCT GLUCOSE: 171 MG/DL (ref 70–110)
POCT GLUCOSE: 172 MG/DL (ref 70–110)
POTASSIUM SERPL-SCNC: 4.1 MMOL/L (ref 3.5–5.1)
POTASSIUM SERPL-SCNC: 4.1 MMOL/L (ref 3.5–5.1)
PROT SERPL-MCNC: 7.9 G/DL (ref 6–8.4)
RBC # BLD AUTO: 3.7 M/UL (ref 4.6–6.2)
SAMPLE: ABNORMAL
SITE: ABNORMAL
SODIUM SERPL-SCNC: 134 MMOL/L (ref 136–145)
SODIUM SERPL-SCNC: 134 MMOL/L (ref 136–145)
WBC # BLD AUTO: 9.61 K/UL (ref 3.9–12.7)

## 2022-06-16 PROCEDURE — 63600367 HC NITRIC OXIDE PER HOUR: Mod: NTX

## 2022-06-16 PROCEDURE — 99292 CRITICAL CARE ADDL 30 MIN: CPT | Mod: NTX,,, | Performed by: PHYSICIAN ASSISTANT

## 2022-06-16 PROCEDURE — 25000003 PHARM REV CODE 250: Mod: NTX | Performed by: INTERNAL MEDICINE

## 2022-06-16 PROCEDURE — 20000000 HC ICU ROOM: Mod: NTX

## 2022-06-16 PROCEDURE — 27000221 HC OXYGEN, UP TO 24 HOURS: Mod: NTX

## 2022-06-16 PROCEDURE — 63600175 PHARM REV CODE 636 W HCPCS: Mod: NTX | Performed by: NURSE PRACTITIONER

## 2022-06-16 PROCEDURE — 99900035 HC TECH TIME PER 15 MIN (STAT): Mod: NTX

## 2022-06-16 PROCEDURE — 85025 COMPLETE CBC W/AUTO DIFF WBC: CPT | Mod: NTX | Performed by: INTERNAL MEDICINE

## 2022-06-16 PROCEDURE — 80053 COMPREHEN METABOLIC PANEL: CPT | Mod: NTX | Performed by: NURSE PRACTITIONER

## 2022-06-16 PROCEDURE — 99223 PR INITIAL HOSPITAL CARE,LEVL III: ICD-10-PCS | Mod: NTX,,, | Performed by: CLINICAL NURSE SPECIALIST

## 2022-06-16 PROCEDURE — 82803 BLOOD GASES ANY COMBINATION: CPT | Mod: NTX

## 2022-06-16 PROCEDURE — 99223 1ST HOSP IP/OBS HIGH 75: CPT | Mod: NTX,,, | Performed by: CLINICAL NURSE SPECIALIST

## 2022-06-16 PROCEDURE — 27000202 HC IABP, ADD'L DAY: Mod: NTX

## 2022-06-16 PROCEDURE — 83050 HGB METHEMOGLOBIN QUAN: CPT | Mod: NTX

## 2022-06-16 PROCEDURE — 99497 PR ADVNCD CARE PLAN 30 MIN: ICD-10-PCS | Mod: 25,,, | Performed by: CLINICAL NURSE SPECIALIST

## 2022-06-16 PROCEDURE — 99292 PR CRITICAL CARE, ADDL 30 MIN: ICD-10-PCS | Mod: NTX,,, | Performed by: PHYSICIAN ASSISTANT

## 2022-06-16 PROCEDURE — 85730 THROMBOPLASTIN TIME PARTIAL: CPT | Mod: NTX | Performed by: INTERNAL MEDICINE

## 2022-06-16 PROCEDURE — 83735 ASSAY OF MAGNESIUM: CPT | Mod: NTX | Performed by: INTERNAL MEDICINE

## 2022-06-16 PROCEDURE — 25000003 PHARM REV CODE 250: Mod: NTX | Performed by: NURSE PRACTITIONER

## 2022-06-16 PROCEDURE — 63600175 PHARM REV CODE 636 W HCPCS: Mod: NTX | Performed by: INTERNAL MEDICINE

## 2022-06-16 PROCEDURE — 85730 THROMBOPLASTIN TIME PARTIAL: CPT | Mod: 91,NTX | Performed by: INTERNAL MEDICINE

## 2022-06-16 PROCEDURE — 25000003 PHARM REV CODE 250: Mod: NTX | Performed by: PHYSICIAN ASSISTANT

## 2022-06-16 PROCEDURE — 80048 BASIC METABOLIC PNL TOTAL CA: CPT | Mod: NTX,XB | Performed by: INTERNAL MEDICINE

## 2022-06-16 PROCEDURE — 83605 ASSAY OF LACTIC ACID: CPT | Mod: NTX | Performed by: INTERNAL MEDICINE

## 2022-06-16 PROCEDURE — 99291 CRITICAL CARE FIRST HOUR: CPT | Mod: NTX,,, | Performed by: INTERNAL MEDICINE

## 2022-06-16 PROCEDURE — 99497 ADVNCD CARE PLAN 30 MIN: CPT | Mod: 25,,, | Performed by: CLINICAL NURSE SPECIALIST

## 2022-06-16 PROCEDURE — 94761 N-INVAS EAR/PLS OXIMETRY MLT: CPT | Mod: NTX

## 2022-06-16 PROCEDURE — 99291 PR CRITICAL CARE, E/M 30-74 MINUTES: ICD-10-PCS | Mod: NTX,,, | Performed by: INTERNAL MEDICINE

## 2022-06-16 RX ORDER — BISACODYL 10 MG
10 SUPPOSITORY, RECTAL RECTAL DAILY PRN
Status: DISCONTINUED | OUTPATIENT
Start: 2022-06-16 | End: 2022-06-29

## 2022-06-16 RX ORDER — CYCLOBENZAPRINE HCL 5 MG
5 TABLET ORAL ONCE
Status: COMPLETED | OUTPATIENT
Start: 2022-06-16 | End: 2022-06-16

## 2022-06-16 RX ORDER — POLYETHYLENE GLYCOL 3350 17 G/17G
17 POWDER, FOR SOLUTION ORAL 2 TIMES DAILY
Status: DISCONTINUED | OUTPATIENT
Start: 2022-06-16 | End: 2022-06-29

## 2022-06-16 RX ORDER — ACETAMINOPHEN 325 MG/1
650 TABLET ORAL EVERY 8 HOURS
Status: DISCONTINUED | OUTPATIENT
Start: 2022-06-16 | End: 2022-06-29

## 2022-06-16 RX ORDER — METHOCARBAMOL 500 MG/1
500 TABLET, FILM COATED ORAL 4 TIMES DAILY
Status: DISCONTINUED | OUTPATIENT
Start: 2022-06-16 | End: 2022-06-29

## 2022-06-16 RX ORDER — HYDROCODONE BITARTRATE AND ACETAMINOPHEN 5; 325 MG/1; MG/1
2 TABLET ORAL EVERY 8 HOURS
Status: DISCONTINUED | OUTPATIENT
Start: 2022-06-16 | End: 2022-06-19

## 2022-06-16 RX ORDER — OXYCODONE AND ACETAMINOPHEN 5; 325 MG/1; MG/1
1 TABLET ORAL ONCE
Status: COMPLETED | OUTPATIENT
Start: 2022-06-16 | End: 2022-06-16

## 2022-06-16 RX ADMIN — MILRINONE LACTATE IN DEXTROSE 0.38 MCG/KG/MIN: 200 INJECTION, SOLUTION INTRAVENOUS at 05:06

## 2022-06-16 RX ADMIN — POTASSIUM CHLORIDE 40 MEQ: 1500 TABLET, EXTENDED RELEASE ORAL at 08:06

## 2022-06-16 RX ADMIN — SUCRALFATE 1 G: 1 TABLET ORAL at 08:06

## 2022-06-16 RX ADMIN — MUPIROCIN: 20 OINTMENT TOPICAL at 08:06

## 2022-06-16 RX ADMIN — CYCLOBENZAPRINE HYDROCHLORIDE 5 MG: 5 TABLET, FILM COATED ORAL at 12:06

## 2022-06-16 RX ADMIN — MIRTAZAPINE 15 MG: 15 TABLET, FILM COATED ORAL at 08:06

## 2022-06-16 RX ADMIN — POLYETHYLENE GLYCOL 3350 17 G: 17 POWDER, FOR SOLUTION ORAL at 08:06

## 2022-06-16 RX ADMIN — ACETAMINOPHEN 650 MG: 325 TABLET ORAL at 09:06

## 2022-06-16 RX ADMIN — METHOCARBAMOL TABLETS 500 MG: 500 TABLET, COATED ORAL at 01:06

## 2022-06-16 RX ADMIN — FUROSEMIDE 40 MG/HR: 10 INJECTION, SOLUTION INTRAMUSCULAR; INTRAVENOUS at 04:06

## 2022-06-16 RX ADMIN — SODIUM CHLORIDE: 9 INJECTION, SOLUTION INTRAVENOUS at 12:06

## 2022-06-16 RX ADMIN — PANTOPRAZOLE SODIUM 40 MG: 40 TABLET, DELAYED RELEASE ORAL at 08:06

## 2022-06-16 RX ADMIN — MILRINONE LACTATE IN DEXTROSE 0.38 MCG/KG/MIN: 200 INJECTION, SOLUTION INTRAVENOUS at 02:06

## 2022-06-16 RX ADMIN — METHOCARBAMOL TABLETS 500 MG: 500 TABLET, COATED ORAL at 04:06

## 2022-06-16 RX ADMIN — Medication 400 MG: at 08:06

## 2022-06-16 RX ADMIN — METHOCARBAMOL TABLETS 500 MG: 500 TABLET, COATED ORAL at 08:06

## 2022-06-16 RX ADMIN — BUSPIRONE HYDROCHLORIDE 7.5 MG: 5 TABLET ORAL at 08:06

## 2022-06-16 RX ADMIN — METOLAZONE 5 MG: 5 TABLET ORAL at 12:06

## 2022-06-16 RX ADMIN — BUSPIRONE HYDROCHLORIDE 7.5 MG: 5 TABLET ORAL at 09:06

## 2022-06-16 RX ADMIN — HYDROCODONE BITARTRATE AND ACETAMINOPHEN 2 TABLET: 5; 325 TABLET ORAL at 01:06

## 2022-06-16 RX ADMIN — CYCLOBENZAPRINE HYDROCHLORIDE 5 MG: 5 TABLET, FILM COATED ORAL at 11:06

## 2022-06-16 RX ADMIN — MILRINONE LACTATE IN DEXTROSE 0.38 MCG/KG/MIN: 200 INJECTION, SOLUTION INTRAVENOUS at 11:06

## 2022-06-16 RX ADMIN — ASPIRIN 81 MG CHEWABLE TABLET 81 MG: 81 TABLET CHEWABLE at 08:06

## 2022-06-16 RX ADMIN — TRAMADOL HYDROCHLORIDE 100 MG: 50 TABLET, COATED ORAL at 04:06

## 2022-06-16 RX ADMIN — HYDROCODONE BITARTRATE AND ACETAMINOPHEN 2 TABLET: 5; 325 TABLET ORAL at 09:06

## 2022-06-16 RX ADMIN — HEPARIN SODIUM 21 UNITS/KG/HR: 5000 INJECTION INTRAVENOUS; SUBCUTANEOUS at 09:06

## 2022-06-16 RX ADMIN — ACETAMINOPHEN 650 MG: 325 TABLET ORAL at 01:06

## 2022-06-16 RX ADMIN — OXYCODONE HYDROCHLORIDE AND ACETAMINOPHEN 1 TABLET: 5; 325 TABLET ORAL at 12:06

## 2022-06-16 RX ADMIN — CYCLOBENZAPRINE HYDROCHLORIDE 5 MG: 5 TABLET, FILM COATED ORAL at 09:06

## 2022-06-16 RX ADMIN — EPINEPHRINE 0.02 MCG/KG/MIN: 1 INJECTION INTRAMUSCULAR; INTRAVENOUS; SUBCUTANEOUS at 06:06

## 2022-06-16 RX ADMIN — SENNOSIDES AND DOCUSATE SODIUM 1 TABLET: 50; 8.6 TABLET ORAL at 08:06

## 2022-06-16 NOTE — PROGRESS NOTES
Interdisciplinary Rounds Report:   Attended interdisciplinary rounds with the \[facility]\""/CTS services including the LVAD Coordinators, social workers, cardiologists, surgeons,  PT/OT/Speech, dietician, and unit charge nurses. Discussed patient status, plan of care, goals of care, including DC date, and post discharge needs. Plan of care will be discussed with the patient and/or family per the physician while rounding on the floor. This is a recurring meeting that is medically and socially necessary to collaborate with the interdisciplinary team to assist patient needs and safe discharge.

## 2022-06-16 NOTE — PLAN OF CARE
Cardiac ICU Care Plan    CVPs: 13, 11, 7  SVO2: 53 & 50  UOP overnight: 2500mL    POC reviewed with Kevan Queen and family. Questions and concerns addressed. No acute events overnight. Pt progressing toward goals. Will continue to monitor. See below and flowsheets for full assessment and VS info.       Neuro:  Candie Coma Scale  Best Eye Response: 4-->(E4) spontaneous  Best Motor Response: 6-->(M6) obeys commands  Best Verbal Response: 5-->(V5) oriented  Candie Coma Scale Score: 15  Assessment Qualifiers: patient not sedated/intubated  Pupil PERRLA: yes    24 hr Temp:  [96.9 °F (36.1 °C)-98.1 °F (36.7 °C)]      CV:  Rhythm: sinus tachycardia   DVT prophylaxis: VTE Required Core Measure: Pharmacological prophylaxis initiated/maintained    CVP (mean): 7 mmHg (06/16/22 0601)       SVO2 (%): 53 % (06/15/22 1901)    IABP Mode: Auto  IABP Rate: 1:1  IABP Trigger: ECG  IABP Augmented Diastolic Pressure: 91          Pulses  Right Radial Pulse: 2+ (normal)  Left Radial Pulse: 2+ (normal)  Right Dorsalis Pedis Pulse: 2+ (normal)  Left Dorsalis Pedis Pulse: 2+ (normal)  Right Posterior Tibial Pulse: 1+ (weak)  Left Posterior Tibial Pulse: 1+ (weak)    Resp:  O2 Device (Oxygen Therapy): nasal cannula w/ humidification  Flow (L/min): 4  Oxygen Concentration (%): 36    GI/:  GI prophylaxis: yes  Diet/Nutrition Received: consistent carb/diabetic diet, low saturated fat/low cholesterol, 2 gram sodium, restrict fluids  Last Bowel Movement: 06/13/22  Voiding Characteristics: external catheter, voids spontaneously without difficulty   Intake/Output Summary (Last 24 hours) at 6/16/2022 0724  Last data filed at 6/16/2022 0603  Gross per 24 hour   Intake 3011.04 ml   Output 6750 ml   Net -3738.96 ml          Labs/Accuchecks:  Recent Labs   Lab 06/14/22  0348 06/15/22  0323 06/15/22  1104 06/15/22  1610 06/15/22  2018 06/16/22  0326   WBC 7.08 9.34  --   --   --  9.61   RBC 3.30* 3.42*  --   --   --  3.70*   HGB 10.1* 10.4*  --   --    --  11.2*   HCT 30.4* 31.5*   < > 36 37 33.4*    257  --   --   --  295    < > = values in this interval not displayed.      Recent Labs   Lab 06/13/22  2150 06/14/22  0549 06/15/22  1708 06/16/22  0019 06/16/22  0609   INR 1.2  --   --   --   --    APTT 26.8   < > 38.5* 41.2* 44.0*    < > = values in this interval not displayed.      Recent Labs     06/16/22  0326   *   K 4.1   CO2 31*   CL 91*   BUN 20   CREATININE 1.9*   ALKPHOS 94   ALT 23   AST 25   BILITOT 1.1*       Recent Labs   Lab 06/13/22  1038 06/13/22  1352 06/13/22  1748   TROPONINI 0.249* 0.242* 0.241*      Recent Labs     06/15/22  2018 06/16/22  0425 06/16/22  0515   PH 7.389 7.385 7.374   PCO2 57.6* 62.2* 64.1*   PO2 29* 26* 29*   HCO3 34.8* 37.2* 37.3*   POCSATURATED 53* 44* 50*   BE 10 12 12       Electrolytes: N/A - electrolytes WDL  Accuchecks: ACHS    Gtts/LDAs:   sodium chloride 0.9% 5 mL/hr at 06/16/22 0501    EPINEPHrine 0.02 mcg/kg/min (06/16/22 0603)    furosemide (LASIX) 10 mg/mL infusion (non-titrating) 40 mg/hr (06/16/22 0501)    heparin (porcine) in D5W 21 Units/kg/hr (06/16/22 0501)    milrinone 20mg/100ml D5W (200mcg/ml) 0.375 mcg/kg/min (06/16/22 0514)    nitric oxide gas         Lines/Drains/Airways       Peripherally Inserted Central Catheter Line  Duration             PICC Double Lumen 11/01/21 right brachial 227 days              Central Venous Catheter Line  Duration             Percutaneous Central Line Insertion/Assessment - Triple Lumen  06/13/22 2145 right internal jugular 2 days              Drain  Duration             Male External Urinary Catheter 06/14/22 0000 2 days              Line  Duration                  IABP 06/13/22 2229 8.0 Fr. 40 mL 2 days                    Skin/Wounds  Bathing/Skin Care: patient refused (06/16/22 0307)  Wounds: No  Wound care consulted: No

## 2022-06-16 NOTE — PLAN OF CARE
CICU DAILY GOALS       A: Awake    RASS: Goal - RASS Goal: 0-->alert and calm  Actual - RASS (Diggs Agitation-Sedation Scale): 0-->alert and calm   Restraint necessity:    B: Breath   SBT: Not intubated   C: Coordinate A & B, analgesics/sedatives   Pain: managed    SAT: Not intubated  D: Delirium   CAM-ICU: Overall CAM-ICU: Negative  E: Early(intubated/ Progressive (non-intubated) Mobility   MOVE Screen: Pass   Activity: Activity Management: Arm raise - L1  FAS: Feeding/Nutrition   Diet order: Diet/Nutrition Received: consistent carb/diabetic diet,   Fluid restriction: Fluid Requirement: 1500cc FR  T: Thrombus   DVT prophylaxis: VTE Required Core Measure: Pharmacological prophylaxis initiated/maintained  H: HOB Elevation   Head of Bed (HOB) Positioning: HOB at 15 degrees  U: Ulcer Prophylaxis   GI: yes  G: Glucose control   managed Glycemic Management: blood glucose monitored  S: Skin   Bundle compliance: yes   Bathing/Skin Care: bath, complete, dressed/undressed, linen changed Date: 6/16/2022  B: Bowel Function   no issues   I: Indwelling Catheters   Cartwright necessity:     CVC necessity: Yes   IPAD offered: Not appropriate  D: De-escalation Antibx   No  Plan for the day   Pt remains with R fem IABP in place, 1:1 auto ECG trigger. Pt also remains on heparin, lasix, epi, and milrinone gtts. CVPs 5, 10, 11. No big issues today. DEYA Martinez updated on patient's status throughout shift.  Family/Goals of care/Code Status   Code Status: Full Code     No acute events throughout day, VS and assessment per flow sheet, patient progressing towards goals as tolerated, plan of care reviewed with Kevan Queen and family, all concerns addressed, will continue to monitor.

## 2022-06-16 NOTE — ASSESSMENT & PLAN NOTE
Pal med consulted for pre-VAD goals of care for Mr. Queen a 36 yo gentleman admitted with acute on chronic combined systolic and diastolic heart failure.    Undergoing evaluation of advanced therapy options - LVAD.  He is awake, alert and oriented to person, place, time and situation.  Currently requires pressor support with epinephrine, in addition to milrinone, lasix and heparin drips.  Inhaled nitric oxide 10 ppm and IABP at 1:2.  Reports no pain or discomforts     Advance Care Planning     - No ACP documents received   - Mr. Queen appears able to make medical decisions  - ACP education provided and Mr. Queen is considering completion of ACP documents   - Has stated his mother Malou Dumont [phone] is his surrogate decision maker and she will make decisions with his significant other Niharika Kyle [phone].  - full code per primary team  And has activated ICD    Goals of Care  - palliative medicine APRN met with patient and family - significnat other  at bedside.  Palliative medicine introduced.    - explained focus of pal med consult is to review patient's understanding of VAD and determine goals of care if there is an adverse event.   - further explained risk of a complication does not mean the patient will have a complication.     The need for preparedness planning was discussed with special attention and in reference to:  a) device failure b) suboptimal QOL post LVAD procedure, c) impact on co- morbid conditions, and d) catastrophic complications such as stroke, renal failure/hemodialysis or other chronic critical illness.   -  Discussion conducted with the pt who reported his goals for health care for getting an LVAD is to have a longer life with less heart failure symptoms.  Mr. Queen states at this time he does not have any concerns or worries about the LVAD. He states understanding he will have a long post op period and hospitalization.  Reports he has begun making plans for discharge -  "ensuring he has a continuous power source and severe weather preparedness.  Mother and significant other will be his primary care providers.    His goal of having the LVAD is to have an increased life time to spend with his children and family. To see them grow and to teach them about life.  Mr. Queen has 7 children with ages ranging from 2 - 16.    He would like to be strong enough to "shoot hoops again"  He is very aware of the limitations of having an LVAD as his father had one a few years ago.  He is aware of life style changes and limitations post VAD placement.  Again he is familiar with these and will do everything possible to stay within the plan of care.     Mr. Queen is aware of potential for adverse events. He also understands just because there are adverse effect it does not mean he will have any.  He is aware that any and all of these  adverse events could lead to the return of heart failure symptoms, debility and death.  In the event of device failure, stroke, sepsis, organ failure  his goals include receiving any and all interventions CPR, intubation, critical care interventions - pressors and HD  without time limitations to provide him with the best chance of recovery. He states accepting these interventions could negatively impact his quality of lfie, lead to pain and suffering.        Mr. Queen has not completed advanced care planning documents.  How to start the conversation guide was provided and he was encouraged to review and communicate his thoughts and wishes with his mother and his significant other.      During the discussion the Mr. Queen  demonstrated good insight/understanding concerning the risk vs benefits of obtaining an LVAD. His father has had a LVAD in the past and understands the lived experience of having a LVAD.    Plan/Recommendations  - continue current plan of care   - patient is considering completion of ACP documents  - patient and family would benefit from continued " information and education regarding LVAD placement and what to expect in the future.

## 2022-06-16 NOTE — CARE UPDATE
Transplant Update Note    Hemodynamics    CVP 13  SvO2 53  CO 5.72  CI 2.60     cc/hr    Administer metolazone 5 mg x1 to achieve goal urine rate of 400/hr +. Lytes okay from earlier today but still cramping. Give Percocet 5-325 x 1 and cyclobenzaprine 5 mg x 1  for cramping. Recheck lytes in morning and replete PRN.    Please call with questions or concerns.     Rebel Sanderson MD  Heart Transplant PGY4  [facility]

## 2022-06-16 NOTE — ASSESSMENT & PLAN NOTE
-[facility]  -Approved for LVAD at [facility] on 4/2/22. Was not evaluated for OHTx as he quit smoking in April 2022  -Moved to ICU 6/13 in the setting of significant volume overloaded and low output state. IABP placed 6/13, Lasix increased to 40 mg/hr and Epi and Marcella added to Primacor. sVO2 has improved at 51 with CO/CI 5.29/2.31 and   -on Lasix drip at 40mg/hr with prn Metolazone.  Given 5mg of Metolazone overnight and is net negative 3.7L in the last 24 hours  -CVP: 7, SVO2: 50, CO: 4.75, CI: 2.04, SVR: 1313.  Will conintue Lasix infusion and hold off on Metolazone today.  If CVP elevates overnight can re dose.   -IABP 1:1, IV milrinone 0.375, epi 0.02, and Marcella 10 ppm.   -TTE 6/14: LVEF 10%, G3DD, mild-moderate reduced RVSF, moderate-severe MR, moderate-sever tr, CVP 15, PASP 56, moderate PH, LVEDD 7.59  -RHC done 4/19/22 on Milrinone 0.5 mcg/kg/min: RA 11, PA 50/27 (35), W 27, CO/CI 3.7/1.7, PVR 2.2 and SVR 1535   -GDMT: Entresto and Aldactone on hold since admit 5/24-5/27 2/2 elevated sCr  -Reports dry weight is 217#, and that he had gained 6# on his home scale  -May need LVAD this admit. Unlikely OHTx candidate with smoking history (quit April 2022)

## 2022-06-16 NOTE — PROGRESS NOTES
Pt has two LVAD dates scheduled pending OR availability. LCSW sent two [facility] requests in for 6/23 and 6/29. [facility] LCSWs will cancel one requesting pending confirmation of surgery date. SW providing ongoing psychosocial, counseling, & emotional support, education, resources, assistance, and discharge planning as indicated.  SW to continue to follow.

## 2022-06-16 NOTE — PROGRESS NOTES
[facility] - Cardiac Intensive Care  Heart Transplant  Progress Note    Patient Name: Kevan Queen  MRN: [id number]  Admission Date: 6/13/2022  Hospital Length of Stay: 3 days  Attending Physician: Josh Pulido MD  Primary Care Provider: ORALIA Cline  Principal Problem:Acute on chronic combined systolic and diastolic heart failure, NYHA class 4    Subjective:     Interval History: Patient given 5mg of Metolazone overnight.  He is net negative 3.7L in the last 24 hours.  CVP: 7, SVO2: 50, CO: 4.75, CI: 2.04, SVR: 1313.  Creatinine is up to 1.9 today so holding further dose of Metolazone today.  Optimizing pain regimen by stopping Flexeril.  Will do scheduled tylenol and methocarbomal as well as hydrocodone-acetaminophen.  Will wean hydrocodone-acetaminophen tomorrow.  Increasing Miralax to BID and adding strawberry boost.  Ordering upper extremity arterial ultrasound today.          Continuous Infusions:   sodium chloride 0.9% 5 mL/hr at 06/16/22 1205    EPINEPHrine 0.02 mcg/kg/min (06/16/22 1205)    furosemide (LASIX) 10 mg/mL infusion (non-titrating) 40 mg/hr (06/16/22 1205)    heparin (porcine) in D5W 21 Units/kg/hr (06/16/22 1205)    milrinone 20mg/100ml D5W (200mcg/ml) 0.375 mcg/kg/min (06/16/22 1412)    nitric oxide gas       Scheduled Meds:   acetaminophen  650 mg Oral Q8H    aspirin  81 mg Oral Daily    busPIRone  7.5 mg Oral Q12H    HYDROcodone-acetaminophen  2 tablet Oral Q8H    magnesium oxide  400 mg Oral BID    methocarbamoL  500 mg Oral QID    mirtazapine  15 mg Oral Nightly    mupirocin   Nasal BID    pantoprazole  40 mg Oral Daily    polyethylene glycol  17 g Oral BID    potassium chloride SA  40 mEq Oral BID    senna-docusate 8.6-50 mg  1 tablet Oral Daily    sucralfate  1 g Oral Nightly     PRN Meds:dextrose 10%, dextrose 10%, glucagon (human recombinant), glucose, glucose, insulin aspart U-100, magnesium oxide, magnesium oxide, potassium bicarbonate, potassium  bicarbonate, potassium bicarbonate, potassium, sodium phosphates, potassium, sodium phosphates, potassium, sodium phosphates, promethazine, sodium chloride 0.9%    Review of patient's allergies indicates:   Allergen Reactions    Bumex [bumetanide] Hives    Lactose Diarrhea     Other reaction(s): Abdominal distension, gaseous    Torsemide Hives     Objective:     Vital Signs (Most Recent):  Temp: 98.8 °F (37.1 °C) (06/16/22 1105)  Pulse: 101 (06/16/22 1405)  Resp: 16 (06/16/22 1405)  BP: (!) 126/59 (06/16/22 1405)  SpO2: 97 % (06/16/22 1405)   Vital Signs (24h Range):  Temp:  [96.9 °F (36.1 °C)-98.8 °F (37.1 °C)] 98.8 °F (37.1 °C)  Pulse:  [101-120] 101  Resp:  [10-40] 16  SpO2:  [96 %-100 %] 97 %  BP: ()/(58-91) 126/59     Patient Vitals for the past 72 hrs (Last 3 readings):   Weight   06/15/22 0300 102.5 kg (225 lb 15.5 oz)   06/14/22 0905 100.7 kg (222 lb)   06/14/22 0030 101.1 kg (222 lb 14.2 oz)       Body mass index is 28.24 kg/m².      Intake/Output Summary (Last 24 hours) at 6/16/2022 1416  Last data filed at 6/16/2022 1405  Gross per 24 hour   Intake 2904.09 ml   Output 5385 ml   Net -2480.91 ml         Hemodynamic Parameters:       Telemetry: ST    Physical Exam  Constitutional:       Appearance: Normal appearance.   HENT:      Head: Normocephalic and atraumatic.   Eyes:      Conjunctiva/sclera: Conjunctivae normal.      Pupils: Pupils are equal, round, and reactive to light.   Neck:      Comments: RIJ TLC  Cardiovascular:      Rate and Rhythm: Regular rhythm. Tachycardia present.      Comments: +S3  IABP to R fem  Pulmonary:      Effort: Pulmonary effort is normal.      Breath sounds: Normal breath sounds.   Abdominal:      General: Bowel sounds are normal. There is distension.   Musculoskeletal:         General: Normal range of motion.      Cervical back: Neck supple.      Comments: Trace TREVER   Skin:     General: Skin is warm and dry.      Capillary Refill: Capillary refill takes 2 to 3 seconds.    Neurological:      General: No focal deficit present.      Mental Status: He is alert and oriented to person, place, and time.   Psychiatric:         Mood and Affect: Mood normal.         Behavior: Behavior normal.         Thought Content: Thought content normal.         Judgment: Judgment normal.       Significant Labs:  CBC:  Recent Labs   Lab 06/14/22  0348 06/15/22  0323 06/15/22  1104 06/15/22  1610 06/15/22  2018 06/16/22  0326   WBC 7.08 9.34  --   --   --  9.61   RBC 3.30* 3.42*  --   --   --  3.70*   HGB 10.1* 10.4*  --   --   --  11.2*   HCT 30.4* 31.5*   < > 36 37 33.4*    257  --   --   --  295   MCV 92 92  --   --   --  90   MCH 30.6 30.4  --   --   --  30.3   MCHC 33.2 33.0  --   --   --  33.5    < > = values in this interval not displayed.       BNP:  Recent Labs   Lab 06/13/22  0901   BNP 1,701*       CMP:  Recent Labs   Lab 06/14/22  0348 06/14/22  1213 06/14/22  2041 06/15/22  0323 06/16/22  0326   *   < > 274* 191* 190*   CALCIUM 8.6*   < > 8.4* 9.0 10.3   ALBUMIN 3.5  --   --  3.7 3.6   PROT 6.5  --   --  7.1 7.9   *   < > 133* 134* 134*   K 3.9   < > 3.9 3.8 4.1   CO2 24   < > 25 28 31*   CL 98   < > 94* 94* 91*   BUN 17   < > 15 15 20   CREATININE 1.8*   < > 1.6* 1.4 1.9*   ALKPHOS 79  --   --  81 94   ALT 24  --   --  25 23   AST 31  --   --  28 25   BILITOT 1.1*  --   --  1.1* 1.1*    < > = values in this interval not displayed.        Coagulation:   Recent Labs   Lab 06/13/22  2150 06/14/22  0549 06/15/22  1708 06/16/22  0019 06/16/22  0609   INR 1.2  --   --   --   --    APTT 26.8   < > 38.5* 41.2* 44.0*    < > = values in this interval not displayed.       LDH:  No results for input(s): LDH in the last 72 hours.  Microbiology:  Microbiology Results (last 7 days)       ** No results found for the last 168 hours. **            I have reviewed all pertinent labs within the past 24 hours.    Estimated Creatinine Clearance: 69 mL/min (A) (based on SCr of 1.9 mg/dL  "(H)).    Diagnostic Results:  I have reviewed and interpreted all pertinent imaging results/findings within the past 24 hours.    Assessment and Plan:     38 yo male with NIDCM with BiV systolic heart failure, on home Milrinone at 0.375 mcg/kg/min, presented at [facility] 4/2/22 ,was not evaluated for OHT as he has recently quit smoking in April 2022 but was approved for VAD with plan to begin OHT evaluation in upcoming months if Mr Queen is stable and suitable for OHT eval (blood group A), 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 Went to ED at [facility] 6/10 but left after waiting 4 hours. Had clinic appointment with us today, but arrived to [facility] early this morning and decided to go to the ED instead. Baseline Lasix dose is 80 mg bid. Reports taking 240 mg qd past 3 days with no improvement. BNP is 1701, up from 898 on 6/2 and 49 on 5/24. sCr is 1.8 with baseline ~ 1.5-1.7. sPO2 on RA is 93%. Wife at bedside    He has been given Lasix 80 mg IVP in the ED with plan to start Lasix gtt at 20 mg/hr           * Acute on chronic combined systolic and diastolic heart failure, NYHA class 4  -NIDCM  -Approved for LVAD at [facility] on 4/2/22. Was not evaluated for OHTx as he quit smoking in April 2022  -Moved to ICU 6/13 in the setting of significant volume overloaded and low output state. IABP placed 6/13, Lasix increased to 40 mg/hr and Epi and Marcella added to Primacor. sVO2 has improved at 51 with CO/CI 5.29/2.31 and   -on Lasix drip at 40mg/hr with prn Metolazone.  Given 5mg of Metolazone overnight and is net negative 3.7L in the last 24 hours  -CVP: 7, SVO2: 50, CO: 4.75, CI: 2.04, SVR: " 1313.  Will conintue Lasix infusion and hold off on Metolazone today.  If CVP elevates overnight can re dose.   -IABP 1:1, IV milrinone 0.375, epi 0.02, and Marcella 10 ppm.   -TTE 6/14: LVEF 10%, G3DD, mild-moderate reduced RVSF, moderate-severe MR, moderate-sever tr, CVP 15, PASP 56, moderate PH, LVEDD 7.59  -RHC done 4/19/22 on Milrinone 0.5 mcg/kg/min: RA 11, PA 50/27 (35), W 27, CO/CI 3.7/1.7, PVR 2.2 and SVR 1535   -GDMT: Entresto and Aldactone on hold since admit 5/24-5/27 2/2 elevated sCr  -Reports dry weight is 217#, and that he had gained 6# on his home scale  -May need LVAD this admit. Unlikely OHTx candidate with smoking history (quit April 2022)           Cardiogenic shock  -See A/C CHF    CKD (chronic kidney disease) stage 3, GFR 30-59 ml/min  -Admit sCr 1.8, baseline ~ 1.5-1.7  -Improved   -Monitor with diuresis    Muscle cramping  -Continue Percocet with muscle relaxant (EKG checked for QTC prolongation)   -Consider TCA if not improved    Uncontrolled diabetes mellitus  -Admitted 5/24-5/27 with hyperglycemia hyperosmolar syndrome  -Hgb A1C 9.2 on 5/20/22  -Glucose per labs today 169  -SSI for now      Uninterrupted Critical Care/Counseling Time (not including procedures): 60 minutes      DEYA Martinez  Heart Transplant  [facility] - Cardiac Int[facility]

## 2022-06-16 NOTE — CONSULTS
Palliative medicine consult for pre-VAD goals of care received.  As per notes the patient has received his education packet.      Pal med consult pending until completed education is documented.  Mr. Queen will remain on the pal med consult list.  Primary team notified per secure chat message.     Thank you for consult and opportunity to participate in Mr. Queen's care.

## 2022-06-16 NOTE — PROGRESS NOTES
Patient and caregiver have read this VAD education.  I have reviewed the contents of this in detail and all questions have been answered to patient and caregiver's satisfaction as evidence by verbal acknowledgement.     Verbal and written VAD Education:     Please carefully read and review the following statement, and, sign to indicate you have been fully informed about the candidacy and evaluation process for the mechanical circulatory support device (MCSD) also known as the Ventricular Assist Device (VAD).     Why a VAD Is Needed   Heart failure is defined as a condition in which your heart is unable to pump enough blood to support the basic needs of your body. This can make you feel tired, have abnormal rhythms, and shortness of breath. Heart failure can also lead to failure of other organs (e.g. liver or kidneys). You are being offered this treatment option because you have a marked increase risk of irreversible end-organ damage or death. For this reason, you are being considered for placement of a Left Ventricular Assist Device (VAD) at [facility]. The heart pump is designed to take over the pumping action of your heart.   Prior to undergoing this procedure, it is important that you and your family understand the options, benefits, risks, and expectations associated with having a VAD. It is required that you and your proposed caregiver(s) understand and agree with the treatment plan and are willing to participate in the guidelines outlined in the following pages.     Bridge to transplant (BTT) is when a VAD is used to help support heart function for someone waiting for a heart transplant. This treatment plan is subject to change pending the results from your evaluation and your physicians decision. If your medical condition worsens after you receive the VAD, you may not be a transplant candidate.   The information within this document pertains only to VAD therapy. You will receive information  regarding heart transplantation allocation, procedures, and risks from the Pre-Transplant when appropriate.   OR   Destination therapy is when a VAD is used as a long-term treatment for patients who are not candidates for transplant, such as those with end-stage congestive heart failure. In these patients, the pumps are placed permanently to help the heart work better. This is subject to change pending the results from your evaluation and your physicians decision.     Types of Ventricular Assist Devices:   There are several different types of mechanical circulatory support devices:   -Left ventricular assist devices (LVAD) help the left side of the heart pump blood to the largest artery of the body, the aorta.     Your VAD Team may also talk with you about:   -Right ventricular assist devices (RVAD) help the right side of the heart pump blood to the lungs.   -Total Artificial Heart (ANABELLE) that replaces the heart and pumps blood to the body.   A VAD is a long-term device utilized by patients for months to years. Some patients may receive another device prior to receiving a VAD depending on their treatment and health status.   When Is a VAD Used?   When medications can no longer help, and other surgical options have been exhausted, a physician may recommend a VAD. A VAD is used to assist the pumping action of a severely weakened heart. It works with your heart to improve and to increase blood flow; it does not replace your own heart. VADs are most often used for patients experiencing New York Heart Association (NYHA) Class III-IV heart failure symptoms.     Alternative Options:   If you are not found to be a candidate for VAD implantation or if you decide that a VAD is not the best option for you, you will continue to receive customary standard of care. You may also continue optimal medical management alone including the use of inotrope therapy. An inotrope is an IV medication that helps the strength of the hearts  contraction. However, the reason that you are being considered for VAD implantation is because optimal medical management has not been adequate and without a VAD, your condition is likely to deteriorate over time. While going straight to transplant may be a possibility, death is a possibility as well.   You May Not Be Eligible To Receive A VAD If You Are Found To Have Any Of The Following:      Any irreversible non-cardiac disease state with less than 2-year survival rate    Inadequate social support to be successful at home after surgery    Irreversible pulmonary disease or fixed pulmonary hypertension    Irreversible renal disease    Irreversible liver disease    Unresolved stroke or uncorrected cerebral vascular disease    A history of chronic noncompliance    Using illicit drugs or alcohol    Uncorrected thyroid disease    Significant right ventricular failure    Obstructive or restrictive cardiomyopathy    Amyloidosis    Active pericardial disease    Untreated aortic aneurysm    Irreversible cognitive dysfunction    History of psychiatric disease, uncontrolled affective disorder, or any cognitive dysfunction that may prevent you from managing self-care    Diabetes with severe retinopathy or peripheral neuropathy    Obesity with BMI (body mass index) greater than 35    Severe chronic malnutrition    Uncorrected blood disorders    Active uncontrolled infection    Pregnancy (positive pregnancy test)    HIV positive or immunosuppression that could result in device infection    Muscular dystrophy, MS or similar disease states    Active systemic infection    Cancer    Insufficient funding     Possible Benefits:   The overall goal is improved health and quality of life. In most cases, because circulation has been restored as a result of the VAD, you can expect to have more energy and also experience less heart failure symptoms. Since VADs help deliver more oxygen rich blood, you may feel  well enough to resume many of the usual activities and hobbies that you enjoy. In fact, many patients return to work and are no longer disabled, depending on the type of work they do. Be aware that you may lose your disability post VAD based on review of your records and disability benefits. It is important that you speak with a  so that you may plan for this should it occur.   The improved circulation may prolong life and may improve some organ damage caused by your heart failure. This is supported by some studies that have shown that VAD patients have a longer survival than patients treated with medications alone. Although the VAD can improve your chances for survival, the type and severity of your heart disease may outweigh any benefits from the device and you may still die.     Possible Surgical/ Anesthesia Risks:   As with any surgery or procedure, there are risks and the possibility of complications or death. There are also risks related to the operation itself and undergoing anesthesia, and risks related to the device itself. You will discuss the risks in detail with the Cardiac Surgeon who intends to perform your surgery. Your surgeon and anesthesia provider will review consent forms prior to surgery.     Operative Procedure Risks:   There are many risks with this operation including but not limited to: death, heart attack, stroke, nerve injury, blood clots, damage to arteries needing limb amputation, bleeding and hemorrhage, hemolysis, infection, development of new antibodies in your blood, mediastinitis, arrhythmia, right heart failure, heart block, or the need for pacemaker or ICD implantation. The need for re-operation for any reason may cause: renal, hepatic or pulmonary failure resulting in death or long-term need of ventilation or dialysis, and blood transfusion with its risk of HIV, and hepatitis. Studies have also shown that patients may have problems with memory, attention,  and speed of processing thoughts after a cardiac surgical procedure. Any of these complications will be explained to you in more detail if you desire. In addition to these potential complications, there may be other risks that are currently unknown.     Risks Related to VAD Therapy:   Include but not limited to: death, need for re-operation, device malfunction or device infection, renal failure requiring dialysis, blood clots, stroke, pain, or bleeding. These risks may lead to prolonged hospital stay or re-hospitalization. Pump exchange due to complications is a possibility. Patients may also experience a potential decrease in their quality of life including limitations of their normal activities. Patients may reach a point where quality of life is so impaired, that the decision to terminate VAD-support will need to be addressed. The longer you are on the device, the greater the chances that complications will develop.   Please see addendum for likelihood of these risks impacting you in your VAD therapy journey.     Evaluation Process:   There will be many people involved in the evaluation process to assure that this is the best choice for you. You will receive a number of tests and consultations. Some of the people that may help evaluate you include Heart Failure Physicians, Cardiac Surgeons, Social Workers and VAD Coordinators. During the evaluation process, you will be given education about the VAD and the care you would require. After the evaluation, the group will decide if you meet the criteria to have a VAD implanted. You may require or have already been implanted with a short-term VAD prior to surgery for a long-term VAD.     Care Team   Additionally, you will meet with a number of different team members to coordinate your care: financial counselor to discuss insurance and dressing supplies, research coordinators, anesthesiologist, psychiatrist/psychologist, physical therapist or occupational therapist to  discuss rehabilitation after VAD, and dietician to improve nutrition. Some patients may be referred to other services for consultation. These may include, but are not limited to: infectious disease doctor, gastroenterologist, nephrologist (kidney doctor), pulmonologist (lung doctor), hepatologist (liver doctor), or an ophthalmologist (eye doctor). It is recommended that you see your dentist to ensure no infection is present and all needed dental work is completed before surgery. Cavities and rotten teeth can cause an infection which can lead to death.   Testing is required to determine VAD candidacy. These include but are not limited to the following:   Laboratory studies of blood and urine, chest x-ray, abdominal ultrasound, CT scan, echocardiogram, cardiopulmonary treadmill, right heart catheterization, electrocardiogram, pulmonary function tests, colonoscopy or endoscopy, vascular studies, and pulmonary studies.     Device Choice:   VADs are currently approved by the Food and Drug Administration (FDA) to be used as Bridge to Transplantation (BTT) or Destination Therapy (DT). A full list will be provided for you and your family to review if desired. [facility] also participates in clinical trials with devices that are considered investigational, and are not yet FDA approved for BTT/DT. Your Surgeon and Cardiologist will discuss with you which device is the best option for you. VADs have four main parts: the implantable heart pump, a tube that passes through the skin of your abdomen (driveline), a controller (small computer) that controls the pump operation, and an external power source (batteries or power device). In addition, there are other VADs that are used temporarily when patients are in cardiogenic shock.   You have the right to refuse surgery at any time. This educational document consent will help you to make an informed decision about your VAD option. If you decide this is not the best option for  you , please make your physician aware. You and your family make the decision to proceed.   Pre-operative Care expectations:    Your surgeon will request a tentative operative date in your name in the event that VAD therapy is the right option defined by you, your family, and provider team. [facility] on [facility].    Informed surgical and anesthesia consents will be completed prior to the procedure.    You will be admitted to the hospital a few days before the day of surgery for optimization before surgery (unless already hospitalized).    Intra-aortic balloon pump or impella will be placed before surgery    If you are listed for transplant, once you go for the VAD your listing status will change. This will be discussed with you by your Transplant team.    If you have an ICD (defibrillator), it will be turned off prior to surgery, and then turned back on after surgery. It will NOT be removed.     Surgical Procedure:   The surgical procedure to implant the VAD will require open-heart surgery and can take on average between 6-12 hours. The surgeon will need to make an incision down the front of your chest to reach your heart. You will have a breathing tube and ventilator while under general anesthesia. The VAD is placed below the heart and the surgeon will connect the pump to your heart and secure it in place with sutures. Once the pump is in place, the VAD along with your natural heart will resume pumping blood through your body. After the surgery is completed, you will receive care in the ICU.     Post-Operative Care Expectations:   Upon arrival to the ICU, you will receive close monitoring and support from the following medical mechanisms:    Upon arrival to ICU, please adhere to our ICU (intensive care unit) visiting hours. You will need to rest and we ask that family not stay the night for a few days.    Heart monitor (telemetry) to monitor heart rate and rhythm.    A breathing tube  (endotracheal tube) and ventilator to assist with breathing and maintain and open airway.    An oral-gastric tube will be utilized to keep the stomach empty when connected to suction, as well as to give the nursing staff the capability to administer oral medications directly into the stomach.    A Cartwright catheter to measure urinary output.    A [place] Catheter to measure pressures within the heart and lungs.    An arterial line catheter in order to measure arterial blood pressure.    Chest tubes to collect and measure drainage from surgery.    A VAD driveline that exits the skin in the abdominal area and is connected to the VAD power source.    Your chest may be left open for 24 to 72 hours after implantation, after which you will be taken back to the operating room to close your chest.     You will receive medications for sedation and to control your pain in order to achieve a tolerable level of comfort. You will also be on IV medications until your blood pressure and fluid status are stable. Your home medications will be resumed as soon as possible if still medically relevant. In addition to your previous taken medications, patients with VADs are commonly prescribed medications for anticoagulation/anti-platelet, antibiotics, blood pressure, and vitamin/mineral supplements. Your length of stay in the ICU will depend on how fast you recover. Once you are more stable, breathing on your own with your lines and tubes out, you will be transferred to a general care unit where you can expect to stay for another 1-3 weeks. On average, your total length of stay will be about three or four weeks after your surgery. During this time, it is expected that you and your family will begin to learn to manage   the device and learn how to manage your care at home. Most patients are able to return home after VAD implantation, but this cannot be guaranteed. Complications may require a prolonged period of hospitalization, long  term acute care facility, or inpatient rehabilitation. Be aware, if you are unattended and the device fails, you may not be able to perform the emergency procedures yourself, which could result in death and/or blood clots in the device.     Education:   Verbal, written, and visual educational materials are provided throughout your hospitalization and are available to anyone involved in your care at home. You and your caregiver(s) will be trained by a VAD Coordinator on how to manage your care and device. Other staff such as your bedside Nurse, the Occupational and Physical Therapists will also provide training to you. You and your caregiver(s) must show ability to manage the device, understand how it operates, troubleshoot problems, and care for your driveline exit site. It is expected that a caregiver(s) will be present and available while you are in the hospital to learn how to manage the device and how to care for you when you are at home. The education will be an ongoing process while you are here at the hospital. Prior to your expected discharge, your family and/or caregivers will be required to show competency in the care and management of you and your VAD. In addition, a VAD Coordinator will ensure that your local fire department, emergency personnel, and any other community members will be given education materials and training as necessary. Your home must have consistent electricity and phone services; the outlets must be three pronged and grounded. Any additional safety needs are arranged during this time. You will need to have your glasses and hearing aids at the hospital in order to be educated, as soon as possible.     Caregiver Requirements:   VAD implantation is based on suitability, need, and a committed caregiver. The caregiver must agree to certain responsibilities for the VAD implant process to continue. This is not negotiable. The VAD patient may be completely dependent on the caregiver. While  the patient is in the hospital, the caregiver is expected to visit daily, preferably at a time between 8 am- 4 pm. The caregiver will need to learn the dressing change process by the nurses and consistently demonstrate the ability to perform VAD dressing change in addition to helping the patient learn about the VAD. If the patient is unable to meet education goals before discharge, they may need 24-hour care. 24-hour caregivers may experience increased stress in their day-to-day life resulting from caring for a loved one with a VAD. There are support groups available to help you through living with a VAD.   The patient will not be able to drive for several months. The caregiver will need to drive the patient to appointments, as frequently as two to three times weekly at first. The caregiver will need to assist the patient with medication management. The caregiver will need to encourage the patient to document VAD numbers daily and know when to call for a problem.   Education begins now and is on-going throughout the VAD patients life.   The VAD team recognizes that VAD patients have an increased satisfactory outcome with a dedicated and committed caregiver. The caregiver has a tremendous responsibility. It is essential for you, the caregiver, to understand what is expected prior to moving forward. Any concerns about being the caregiver should be discussed with the , the VAD coordinator, or the physician.     Discharge Process:   Your daily progress will be followed by a team of people involved in your care including your Surgeon, Cardiologist, VAD Coordinators, Staff Nurses, Nurse Practitioners, Physician Assistants, Physical/Occupational Therapy, Social Workers, and a Discharge Planner. They will monitor your recovery and help you to adjust to life with a VAD. You will need to demonstrate the ability to care for yourself, such as grooming and exercise. You will also need to demonstrate the ability to  care for your VAD, the equipment, and alarms. You must have a thermometer, weight scale, flashlight, and a blood pressure cuff at your home. Most patients return home however, some patients choose to live with a caregiver or need a rehabilitation facility for a short period before returning home. If insurance allows, a Home Health nurse will be recommended to come to your home and assist you in your care when you return home. The length of time that the Visiting Nurse will come to your home will depend on your overall recovery. It is recommended  after you return home that you enroll in a Cardiac Rehab program to continue to improve your physical health.     Follow up care:   After you are discharged, you will follow up with your Surgeon, your Heart Failure Cardiologist and your VAD coordinator. They will collaboratively care for you and make decisions about your treatment. Typically, your first visit will be 1 week after discharge. We will see you every week for 3-4 weeks, followed by every 2 weeks for 1 month, then monthly thereafter while you have the VAD. Once you are considered a stable established patient, your Physicians may decide that you can follow-up every 2-3 months. Along with seeing your Cardiologist and Surgeon, you will have laboratory testing, and other physiological testing done on a regular basis in order to monitor and maintain your progress and health. The types of testing that you may need and the frequency will be decided by your Physicians but can include blood tests, EKG, Echocardiogram, Right Heart Catheterization, V02 Treadmill Stress Test, and Implantable Cardioverter Defibrillator (ICD) device check. If you have received an investigational VAD, you will have other testing that will be required for the research study. You will be required to take anticoagulation medications, also known as blood thinning medications (coumadin, warfarin). You will   be required to have frequent blood  draws, up to 2-3 times a week, in order to monitor your blood count and blood thinning agents. You will also be in frequent contact with a VAD Coordinator who will make phone calls to assess how you are doing at home and assist you with any problems that may arise. A VAD Coordinator and a Heart Failure Cardiologist are also available 24 hours a day in the event that you have an emergency. On average, you can expect that within 12 weeks after surgery, you will be able to return to most activities, with the permission of your VAD Team.     Lifestyle Changes and Prohibited Activities:   You will have limitations and can resume most usual activities. Certain activities are hazardous or fatal after implant. Persons with implantable VADs must not allow their controller/computer and electrical equipment to submerge in water. Showering is possible with proper protective equipment. You may only resume showering once your driveline has healed and your VAD coordinator gives their permission. Swimming and baths are prohibited. You cannot sleep on your stomach or the side the driveline is exiting your abdomen. Contact sports, repetitive jumping, or impact with an airbag are examples of activities that may cause trauma to the pump attachments and must be avoided.   You may be sexually active but must care for your driveline. Female patients cannot get pregnant. Medical care after implant includes lifetime follow up to monitor device function and health status. You may not have a magnetic resonance imaging (MRI) test because of the magnetic fields. You may not vacuum, touch television or computer screens, or take hot clothes out of the dryer due to the static electricity. VAD therapy requires significant self-care responsibility and a willingness to participate with you VAD team. Driveline exit site dressings must be performed daily using sterile technique outlined in your care of driveline documentation or as directed by your VAD  team. Care of your driveline must be done daily by yourself or caregiver. Maintenance care of the device components, batteries, and driveline is necessary to prevent pump failure, infections, or other serious complications. You will continue to take medications for your heart failure although the dose and or medication name may change. You may continue to take your diuretic such as lasix or demedex. You may also continue to be on a fluid restriction. You may drive once approved by your VAD team. You may be able to travel with your VAD, depending on the situation.     VAD Equipment:   Along with the device that is implanted inside your body, you will have a number of other external pieces of equipment that will require care and maintenance. You will have a driveline that exits your body through your abdomen that will power a controller, which is the computer component that tells the heart pump how to perform. The controller will also tell you about alarms, sounds, and words, on how your pump is operating and if there are any problems. In order to power the device and the controller, you will have batteries and a battery charger and/or power device. The batteries allow you to be mobile and move freely without being attached to outlet power. The  or power device allows   you to be connected to power for long periods of time such as when you are sleeping. Different VADs have similar pieces of equipment, but will vary depending on the device you receive. You will receive education and teaching before you leave the hospital to make sure you understand clearly how to operate the equipment and troubleshoot problems that may occur.     Confidentiality:   Hospital personnel who are involved in the course of your care may review your medical record. Communication between you and [facility] remains confidential. If you do become a candidate for transplant, data about your case, which will include your identity, will be  sent to [employer]) and may be sent to other places involved in the transplant process as permitted by law. Data about your VAD, which will include your identity, will be shared with the  of the device. Device  may be involved in your care alongside your VAD Team in the hospital and/or clinic setting. Your information may also be entered into a registry for pump implants, known as INTERMACS. Your participation in this is voluntary, and will be discussed at greater length prior to implant.     End of Life:   If you are approaching the end of life, the VAD can be turned off. You may be in a hospital, home, or hospice setting. If you become very sick and do not have a chance to survive, we may talk about stopping the pump. The doctor would talk to you or your family about what is right for you. It is helpful to talk to your family about your goals before surgery so they know what your wishes are. A member of our Goals of Care team will visit you before surgery to help you learn your goals. You have the right to have the device turned off or to decline pump exchange if your pump fails or malfunctions.     Device Return:   At the time of either transplant or death, the surgeon may need to remove the device to return to the . You or a family member may need to sign a separate consent for removal of the device.   Questions   We encourage you to learn everything you can about the potential benefits and risks of having a VAD. If you or your family has any questions, you should feel free to contact your Transplant Coordinator or VAD Coordinator.   By signing this form, you understand and have reviewed the implant procedure as well as the potential benefits and risks involved with the getting a VAD. You also acknowledge and understand the care that will be required to maintain this device and yourself, including changes in your lifestyle, and impact  on your independence.

## 2022-06-16 NOTE — SUBJECTIVE & OBJECTIVE
nterval History:     Past Medical History:   Diagnosis Date    Arthritis     Cardiomyopathy     CHF (congestive heart failure) 10/01/2020    Diabetes mellitus     Dilated cardiomyopathy 10/26/2020    Drug abuse 10/2020    Hyperosmolar hyperglycemic state (HHS) 5/25/2022    ICD (implantable cardioverter-defibrillator) in place 10/26/2020    Renal disorder        Past Surgical History:   Procedure Laterality Date    CARDIAC DEFIBRILLATOR PLACEMENT      RIGHT HEART CATHETERIZATION Right 4/8/2022    Procedure: INSERTION, CATHETER, RIGHT HEART;  Surgeon: Luca Lopez Jr., MD;  Location: [facility] CATH LAB;  Service: Cardiology;  Laterality: Right;    RIGHT HEART CATHETERIZATION Right 4/19/2022    Procedure: INSERTION, CATHETER, RIGHT HEART;  Surgeon: Josh Pulido MD;  Location: [facility] CATH LAB;  Service: Cardiology;  Laterality: Right;       Review of patient's allergies indicates:   Allergen Reactions    Bumex [bumetanide] Hives    Lactose Diarrhea     Other reaction(s): Abdominal distension, gaseous    Torsemide Hives       Medications:  Continuous Infusions:   sodium chloride 0.9% 5 mL/hr at 06/16/22 1605    EPINEPHrine 0.02 mcg/kg/min (06/16/22 1605)    furosemide (LASIX) 10 mg/mL infusion (non-titrating) 40 mg/hr (06/16/22 1619)    heparin (porcine) in D5W 21 Units/kg/hr (06/16/22 1605)    milrinone 20mg/100ml D5W (200mcg/ml) 0.375 mcg/kg/min (06/16/22 1605)    nitric oxide gas       Scheduled Meds:   acetaminophen  650 mg Oral Q8H    aspirin  81 mg Oral Daily    busPIRone  7.5 mg Oral Q12H    HYDROcodone-acetaminophen  2 tablet Oral Q8H    magnesium oxide  400 mg Oral BID    methocarbamoL  500 mg Oral QID    mirtazapine  15 mg Oral Nightly    mupirocin   Nasal BID    pantoprazole  40 mg Oral Daily    polyethylene glycol  17 g Oral BID    potassium chloride SA  40 mEq Oral BID    senna-docusate 8.6-50 mg  1 tablet Oral Daily    sucralfate  1 g Oral Nightly     PRN  Meds:bisacodyL, dextrose 10%, dextrose 10%, glucagon (human recombinant), glucose, glucose, insulin aspart U-100, magnesium oxide, magnesium oxide, potassium bicarbonate, potassium bicarbonate, potassium bicarbonate, potassium, sodium phosphates, potassium, sodium phosphates, potassium, sodium phosphates, promethazine, sodium chloride 0.9%    Family History       Problem Relation (Age of Onset)    Diverticulosis Brother    Heart attack Maternal Grandmother, Maternal Grandfather    Heart failure Father          Tobacco Use    Smoking status: Former Smoker     Packs/day: 0.50     Years: 16.00     Pack years: 8.00     Start date: 10/1/2004     Quit date: 2021     Years since quittin.1    Smokeless tobacco: Never Used   Substance and Sexual Activity    Alcohol use: Not Currently    Drug use: Not Currently     Types: Marijuana, MDMA (Ecstacy)    Sexual activity: Yes     Partners: Female     Birth control/protection: None       Review of Systems   Constitutional:  Positive for activity change. Negative for appetite change and unexpected weight change.   Eyes: Negative.    Respiratory:  Positive for shortness of breath.    Cardiovascular:  Positive for leg swelling.   Gastrointestinal:  Positive for abdominal distention.   Endocrine: Negative.    Genitourinary: Negative.    Musculoskeletal: Negative.    Skin: Negative.    Allergic/Immunologic: Negative.    Neurological:  Positive for weakness.   Psychiatric/Behavioral:  Negative for confusion. The patient is not nervous/anxious.    Objective:     Vital Signs (Most Recent):  Temp: 98.3 °F (36.8 °C) (22 1505)  Pulse: 101 (22 1605)  Resp: 20 (22 1605)  BP: 106/62 (22 1605)  SpO2: 97 % (22 1605) Vital Signs (24h Range):  Temp:  [96.9 °F (36.1 °C)-98.8 °F (37.1 °C)] 98.3 °F (36.8 °C)  Pulse:  [101-120] 101  Resp:  [12-40] 20  SpO2:  [95 %-100 %] 97 %  BP: ()/(58-91) 106/62     Weight: 102.5 kg (225 lb 15.5 oz)  Body mass index  is 28.24 kg/m².    Physical Exam  Vitals and nursing note reviewed.   Constitutional:       Appearance: He is not toxic-appearing.   HENT:      Head: Normocephalic.      Right Ear: External ear normal.      Left Ear: External ear normal.      Nose: Nose normal.      Mouth/Throat:      Mouth: Mucous membranes are moist.   Eyes:      Conjunctiva/sclera: Conjunctivae normal.      Pupils: Pupils are equal, round, and reactive to light.   Cardiovascular:      Rate and Rhythm: Normal rate and regular rhythm.      Pulses: Normal pulses.   Pulmonary:      Effort: Pulmonary effort is normal.      Breath sounds: Normal breath sounds.   Abdominal:      General: Abdomen is flat. Bowel sounds are normal.   Musculoskeletal:      Cervical back: Normal range of motion and neck supple.      Right lower leg: Edema present.      Left lower leg: Edema present.      Comments: Left leg IABP   Skin:     General: Skin is warm and dry.   Neurological:      Mental Status: He is alert and oriented to person, place, and time.   Psychiatric:         Mood and Affect: Mood normal.         Behavior: Behavior normal.         Thought Content: Thought content normal.       Review of Symptoms      Symptom Assessment (ESAS 0-10 Scale)  Pain:  0  Dyspnea:  0  Anxiety:  0  Nausea:  0  Depression:  0  Anorexia:  0  Fatigue:  0  Insomnia:  0  Restlessness:  0  Agitation:  0     CAM / Delirium:  Negative  Constipation:  Negative  Diarrhea:  Negative    Pain Assessment:  OME in 24 hours:  0  Location(s):      Modified Ria Scale:  0    Living Arrangements:  Lives with family    Psychosocial/Cultural: Not , 7 children  ages range from 2 -16 yo,  different mothers, disabled , lives with significant other in mother's home     Spiritual:  F - Margie and Belief:  Did not identify   A - Address in Care:  Not amenable to  visits     Advance Care Planning   Advance Directives:   Living Will: No        Oral Declaration: No    LaPOST: No    Medical Power  of : No        Oral Declaration: No      Decision Making:  Family answered questions       Significant Labs: All pertinent labs within the past 24 hours have been reviewed.  CBC:   Recent Labs   Lab 06/16/22 0326   WBC 9.61   HGB 11.2*   HCT 33.4*   MCV 90        BMP:  Recent Labs   Lab 06/16/22  0326 06/16/22  1511   * 197*   * 134*   K 4.1 4.1   CL 91* 86*   CO2 31* 32*   BUN 20 26*   CREATININE 1.9* 1.9*   CALCIUM 10.3 10.3   MG 2.2  --      LFT:  Lab Results   Component Value Date    AST 25 06/16/2022    ALKPHOS 94 06/16/2022    BILITOT 1.1 (H) 06/16/2022     Albumin:   Albumin   Date Value Ref Range Status   06/16/2022 3.6 3.5 - 5.2 g/dL Final     Protein:   Total Protein   Date Value Ref Range Status   06/16/2022 7.9 6.0 - 8.4 g/dL Final     Lactic acid:   Lab Results   Component Value Date    LACTATE 1.1 06/16/2022    LACTATE 1.3 06/13/2022       Significant Imaging: I have reviewed all pertinent imaging results/findings within the past 24 hours.

## 2022-06-16 NOTE — PROGRESS NOTES
06/16/2022  Enrique NANDO Awan Jr    Current provider:  Josh Pulido MD    Device interrogation:  No flowsheet data found.       Rounded on Kevanmohit Queen to ensure all mechanical assist device settings (IABP or VAD) were appropriate and all parameters were within limits.  I was able to ensure all back up equipment was present, the staff had no issues, and the Perfusion Department daily rounding was complete.      For implantable VADs: Interrogation of Ventricular assist device was performed with analysis of device parameters and review of device function. I have personally reviewed the interrogation findings and agree with findings as stated.     In emergency, the nursing units have been notified to contact the perfusion department either by:  Calling [phone] from 630am to 4pm Mon thru Fri, utilizing the On-Call Finder functionality of Epic and searching for Perfusion, or by contacting the hospital  from 4pm to 630am and on weekends and asking to speak with the perfusionist on call.    6:17 AM

## 2022-06-16 NOTE — SUBJECTIVE & OBJECTIVE
Interval History: Patient given 5mg of Metolazone overnight.  He is net negative 3.7L in the last 24 hours.  CVP: 7, SVO2: 50, CO: 4.75, CI: 2.04, SVR: 1313.  Creatinine is up to 1.9 today so holding further dose of Metolazone today.  Optimizing pain regimen by stopping Flexeril.  Will do scheduled tylenol and methocarbomal as well as hydrocodone-acetaminophen.  Will wean hydrocodone-acetaminophen tomorrow.  Increasing Miralax to BID and adding strawberry boost.  Ordering upper extremity arterial ultrasound today.          Continuous Infusions:   sodium chloride 0.9% 5 mL/hr at 06/16/22 1205    EPINEPHrine 0.02 mcg/kg/min (06/16/22 1205)    furosemide (LASIX) 10 mg/mL infusion (non-titrating) 40 mg/hr (06/16/22 1205)    heparin (porcine) in D5W 21 Units/kg/hr (06/16/22 1205)    milrinone 20mg/100ml D5W (200mcg/ml) 0.375 mcg/kg/min (06/16/22 1412)    nitric oxide gas       Scheduled Meds:   acetaminophen  650 mg Oral Q8H    aspirin  81 mg Oral Daily    busPIRone  7.5 mg Oral Q12H    HYDROcodone-acetaminophen  2 tablet Oral Q8H    magnesium oxide  400 mg Oral BID    methocarbamoL  500 mg Oral QID    mirtazapine  15 mg Oral Nightly    mupirocin   Nasal BID    pantoprazole  40 mg Oral Daily    polyethylene glycol  17 g Oral BID    potassium chloride SA  40 mEq Oral BID    senna-docusate 8.6-50 mg  1 tablet Oral Daily    sucralfate  1 g Oral Nightly     PRN Meds:dextrose 10%, dextrose 10%, glucagon (human recombinant), glucose, glucose, insulin aspart U-100, magnesium oxide, magnesium oxide, potassium bicarbonate, potassium bicarbonate, potassium bicarbonate, potassium, sodium phosphates, potassium, sodium phosphates, potassium, sodium phosphates, promethazine, sodium chloride 0.9%    Review of patient's allergies indicates:   Allergen Reactions    Bumex [bumetanide] Hives    Lactose Diarrhea     Other reaction(s): Abdominal distension, gaseous    Torsemide Hives     Objective:     Vital Signs (Most Recent):  Temp: 98.8  °F (37.1 °C) (06/16/22 1105)  Pulse: 101 (06/16/22 1405)  Resp: 16 (06/16/22 1405)  BP: (!) 126/59 (06/16/22 1405)  SpO2: 97 % (06/16/22 1405)   Vital Signs (24h Range):  Temp:  [96.9 °F (36.1 °C)-98.8 °F (37.1 °C)] 98.8 °F (37.1 °C)  Pulse:  [101-120] 101  Resp:  [10-40] 16  SpO2:  [96 %-100 %] 97 %  BP: ()/(58-91) 126/59     Patient Vitals for the past 72 hrs (Last 3 readings):   Weight   06/15/22 0300 102.5 kg (225 lb 15.5 oz)   06/14/22 0905 100.7 kg (222 lb)   06/14/22 0030 101.1 kg (222 lb 14.2 oz)       Body mass index is 28.24 kg/m².      Intake/Output Summary (Last 24 hours) at 6/16/2022 1416  Last data filed at 6/16/2022 1405  Gross per 24 hour   Intake 2904.09 ml   Output 5385 ml   Net -2480.91 ml         Hemodynamic Parameters:       Telemetry: ST    Physical Exam  Constitutional:       Appearance: Normal appearance.   HENT:      Head: Normocephalic and atraumatic.   Eyes:      Conjunctiva/sclera: Conjunctivae normal.      Pupils: Pupils are equal, round, and reactive to light.   Neck:      Comments: RIJ TLC  Cardiovascular:      Rate and Rhythm: Regular rhythm. Tachycardia present.      Comments: +S3  IABP to R fem  Pulmonary:      Effort: Pulmonary effort is normal.      Breath sounds: Normal breath sounds.   Abdominal:      General: Bowel sounds are normal. There is distension.   Musculoskeletal:         General: Normal range of motion.      Cervical back: Neck supple.      Comments: Trace TREVER   Skin:     General: Skin is warm and dry.      Capillary Refill: Capillary refill takes 2 to 3 seconds.   Neurological:      General: No focal deficit present.      Mental Status: He is alert and oriented to person, place, and time.   Psychiatric:         Mood and Affect: Mood normal.         Behavior: Behavior normal.         Thought Content: Thought content normal.         Judgment: Judgment normal.       Significant Labs:  CBC:  Recent Labs   Lab 06/14/22  0348 06/15/22  0323 06/15/22  1104  06/15/22  1610 06/15/22  2018 06/16/22  0326   WBC 7.08 9.34  --   --   --  9.61   RBC 3.30* 3.42*  --   --   --  3.70*   HGB 10.1* 10.4*  --   --   --  11.2*   HCT 30.4* 31.5*   < > 36 37 33.4*    257  --   --   --  295   MCV 92 92  --   --   --  90   MCH 30.6 30.4  --   --   --  30.3   MCHC 33.2 33.0  --   --   --  33.5    < > = values in this interval not displayed.       BNP:  Recent Labs   Lab 06/13/22  0901   BNP 1,701*       CMP:  Recent Labs   Lab 06/14/22  0348 06/14/22  1213 06/14/22  2041 06/15/22  0323 06/16/22  0326   *   < > 274* 191* 190*   CALCIUM 8.6*   < > 8.4* 9.0 10.3   ALBUMIN 3.5  --   --  3.7 3.6   PROT 6.5  --   --  7.1 7.9   *   < > 133* 134* 134*   K 3.9   < > 3.9 3.8 4.1   CO2 24   < > 25 28 31*   CL 98   < > 94* 94* 91*   BUN 17   < > 15 15 20   CREATININE 1.8*   < > 1.6* 1.4 1.9*   ALKPHOS 79  --   --  81 94   ALT 24  --   --  25 23   AST 31  --   --  28 25   BILITOT 1.1*  --   --  1.1* 1.1*    < > = values in this interval not displayed.        Coagulation:   Recent Labs   Lab 06/13/22  2150 06/14/22  0549 06/15/22  1708 06/16/22  0019 06/16/22  0609   INR 1.2  --   --   --   --    APTT 26.8   < > 38.5* 41.2* 44.0*    < > = values in this interval not displayed.       LDH:  No results for input(s): LDH in the last 72 hours.  Microbiology:  Microbiology Results (last 7 days)       ** No results found for the last 168 hours. **            I have reviewed all pertinent labs within the past 24 hours.    Estimated Creatinine Clearance: 69 mL/min (A) (based on SCr of 1.9 mg/dL (H)).    Diagnostic Results:  I have reviewed and interpreted all pertinent imaging results/findings within the past 24 hours.

## 2022-06-16 NOTE — CONSULTS
[facility] - Cardiac Intensive Care  Palliative Medicine  Consult Note    Patient Name: Kevan Queen  MRN: [id number]  Admission Date: 6/13/2022  Hospital Length of Stay: 3 days  Code Status: Full Code   Attending Provider: Josh Pulido MD  Consulting Provider: DENZEL Coppola  Primary Care Physician: ORALIA Cline  Principal Problem:Acute on chronic combined systolic and diastolic heart failure, NYHA class 4    Patient information was obtained from patient, past medical records and primary team.      Consults  Assessment/Plan:     Palliative care encounter  Pal med consulted for pre-VAD goals of care for Mr. Queen a 36 yo gentleman admitted with acute on chronic combined systolic and diastolic heart failure.    Undergoing evaluation of advanced therapy options - LVAD.  He is awake, alert and oriented to person, place, time and situation.  Currently requires pressor support with epinephrine, in addition to milrinone, lasix and heparin drips.  Inhaled nitric oxide 10 ppm and IABP at 1:2.  Reports no pain or discomforts     Advance Care Planning     - No ACP documents received   - Mr. Queen appears able to make medical decisions  - ACP education provided and Mr. Queen is considering completion of ACP documents   - Has stated his mother Malou Dumont [phone] is his surrogate decision maker and she will make decisions with his significant other Page Kyle [phone].  - full code per primary team  And has activated ICD    Goals of Care  - palliative medicine APRN met with patient and family - significnat other  at bedside.  Palliative medicine introduced.    - explained focus of pal med consult is to review patient's understanding of VAD and determine goals of care if there is an adverse event.   - further explained risk of a complication does not mean the patient will have a complication.     The need for preparedness planning was discussed with special attention and in reference to:   "a) device failure b) suboptimal QOL post LVAD procedure, c) impact on co- morbid conditions, and d) catastrophic complications such as stroke, renal failure/hemodialysis or other chronic critical illness.   -  Discussion conducted with the pt who reported his goals for health care for getting an LVAD is to have a longer life with less heart failure symptoms.  Mr. Queen states at this time he does not have any concerns or worries about the LVAD. He states understanding he will have a long post op period and hospitalization.  Reports he has begun making plans for discharge - ensuring he has a continuous power source and severe weather preparedness.  Mother and significant other will be his primary care providers.    His goal of having the LVAD is to have an increased life time to spend with his children and family. To see them grow and to teach them about life.  Mr. Queen has 7 children with ages ranging from 2 - 16.    He would like to be strong enough to "shoot hoops again"  He is very aware of the limitations of having an LVAD as his father had one a few years ago.  He is aware of life style changes and limitations post VAD placement.  Again he is familiar with these and will do everything possible to stay within the plan of care.     Mr. Queen is aware of potential for adverse events. He also understands just because there are adverse effect it does not mean he will have any.  He is aware that any and all of these  adverse events could lead to the return of heart failure symptoms, debility and death.  In the event of device failure, stroke, sepsis, organ failure  his goals include receiving any and all interventions CPR, intubation, critical care interventions - pressors and HD  without time limitations to provide him with the best chance of recovery. He states accepting these interventions could negatively impact his quality of lfie, lead to pain and suffering.        Mr. Queen has not completed advanced care " planning documents.  How to start the conversation guide was provided and he was encouraged to review and communicate his thoughts and wishes with his mother and his significant other.      During the discussion the Mr. Queen  demonstrated good insight/understanding concerning the risk vs benefits of obtaining an LVAD. His father has had a LVAD in the past and understands the lived experience of having a LVAD.    Plan/Recommendations  - continue current plan of care   - patient is considering completion of ACP documents  - patient and family would benefit from continued information and education regarding LVAD placement and what to expect in the future.            Thank you for your consult. I will follow-up with patient. Please contact us if you have any additional questions.    Subjective:     HPI:   No notes on file    Hospital Course:  No notes on file    nterval History:     Past Medical History:   Diagnosis Date    Arthritis     Cardiomyopathy     CHF (congestive heart failure) 10/01/2020    Diabetes mellitus     Dilated cardiomyopathy 10/26/2020    Drug abuse 10/2020    Hyperosmolar hyperglycemic state (HHS) 5/25/2022    ICD (implantable cardioverter-defibrillator) in place 10/26/2020    Renal disorder        Past Surgical History:   Procedure Laterality Date    CARDIAC DEFIBRILLATOR PLACEMENT      RIGHT HEART CATHETERIZATION Right 4/8/2022    Procedure: INSERTION, CATHETER, RIGHT HEART;  Surgeon: Luca Lopez Jr., MD;  Location: [facility] CATH LAB;  Service: Cardiology;  Laterality: Right;    RIGHT HEART CATHETERIZATION Right 4/19/2022    Procedure: INSERTION, CATHETER, RIGHT HEART;  Surgeon: Josh Pulido MD;  Location: [facility] CATH LAB;  Service: Cardiology;  Laterality: Right;       Review of patient's allergies indicates:   Allergen Reactions    Bumex [bumetanide] Hives    Lactose Diarrhea     Other reaction(s): Abdominal distension, gaseous    Torsemide Hives        Medications:  Continuous Infusions:   sodium chloride 0.9% 5 mL/hr at 22 1605    EPINEPHrine 0.02 mcg/kg/min (22 1605)    furosemide (LASIX) 10 mg/mL infusion (non-titrating) 40 mg/hr (22 1619)    heparin (porcine) in D5W 21 Units/kg/hr (22 1605)    milrinone 20mg/100ml D5W (200mcg/ml) 0.375 mcg/kg/min (22 160)    nitric oxide gas       Scheduled Meds:   acetaminophen  650 mg Oral Q8H    aspirin  81 mg Oral Daily    busPIRone  7.5 mg Oral Q12H    HYDROcodone-acetaminophen  2 tablet Oral Q8H    magnesium oxide  400 mg Oral BID    methocarbamoL  500 mg Oral QID    mirtazapine  15 mg Oral Nightly    mupirocin   Nasal BID    pantoprazole  40 mg Oral Daily    polyethylene glycol  17 g Oral BID    potassium chloride SA  40 mEq Oral BID    senna-docusate 8.6-50 mg  1 tablet Oral Daily    sucralfate  1 g Oral Nightly     PRN Meds:bisacodyL, dextrose 10%, dextrose 10%, glucagon (human recombinant), glucose, glucose, insulin aspart U-100, magnesium oxide, magnesium oxide, potassium bicarbonate, potassium bicarbonate, potassium bicarbonate, potassium, sodium phosphates, potassium, sodium phosphates, potassium, sodium phosphates, promethazine, sodium chloride 0.9%    Family History       Problem Relation (Age of Onset)    Diverticulosis Brother    Heart attack Maternal Grandmother, Maternal Grandfather    Heart failure Father          Tobacco Use    Smoking status: Former Smoker     Packs/day: 0.50     Years: 16.00     Pack years: 8.00     Start date: 10/1/2004     Quit date: 2021     Years since quittin.1    Smokeless tobacco: Never Used   Substance and Sexual Activity    Alcohol use: Not Currently    Drug use: Not Currently     Types: Marijuana, MDMA (Ecstacy)    Sexual activity: Yes     Partners: Female     Birth control/protection: None       Review of Systems   Constitutional:  Positive for activity change. Negative for appetite change and unexpected  weight change.   Eyes: Negative.    Respiratory:  Positive for shortness of breath.    Cardiovascular:  Positive for leg swelling.   Gastrointestinal:  Positive for abdominal distention.   Endocrine: Negative.    Genitourinary: Negative.    Musculoskeletal: Negative.    Skin: Negative.    Allergic/Immunologic: Negative.    Neurological:  Positive for weakness.   Psychiatric/Behavioral:  Negative for confusion. The patient is not nervous/anxious.    Objective:     Vital Signs (Most Recent):  Temp: 98.3 °F (36.8 °C) (06/16/22 1505)  Pulse: 101 (06/16/22 1605)  Resp: 20 (06/16/22 1605)  BP: 106/62 (06/16/22 1605)  SpO2: 97 % (06/16/22 1605) Vital Signs (24h Range):  Temp:  [96.9 °F (36.1 °C)-98.8 °F (37.1 °C)] 98.3 °F (36.8 °C)  Pulse:  [101-120] 101  Resp:  [12-40] 20  SpO2:  [95 %-100 %] 97 %  BP: ()/(58-91) 106/62     Weight: 102.5 kg (225 lb 15.5 oz)  Body mass index is 28.24 kg/m².    Physical Exam  Vitals and nursing note reviewed.   Constitutional:       Appearance: He is not toxic-appearing.   HENT:      Head: Normocephalic.      Right Ear: External ear normal.      Left Ear: External ear normal.      Nose: Nose normal.      Mouth/Throat:      Mouth: Mucous membranes are moist.   Eyes:      Conjunctiva/sclera: Conjunctivae normal.      Pupils: Pupils are equal, round, and reactive to light.   Cardiovascular:      Rate and Rhythm: Normal rate and regular rhythm.      Pulses: Normal pulses.   Pulmonary:      Effort: Pulmonary effort is normal.      Breath sounds: Normal breath sounds.   Abdominal:      General: Abdomen is flat. Bowel sounds are normal.   Musculoskeletal:      Cervical back: Normal range of motion and neck supple.      Right lower leg: Edema present.      Left lower leg: Edema present.      Comments: Left leg IABP   Skin:     General: Skin is warm and dry.   Neurological:      Mental Status: He is alert and oriented to person, place, and time.   Psychiatric:         Mood and Affect: Mood  normal.         Behavior: Behavior normal.         Thought Content: Thought content normal.       Review of Symptoms      Symptom Assessment (ESAS 0-10 Scale)  Pain:  0  Dyspnea:  0  Anxiety:  0  Nausea:  0  Depression:  0  Anorexia:  0  Fatigue:  0  Insomnia:  0  Restlessness:  0  Agitation:  0     CAM / Delirium:  Negative  Constipation:  Negative  Diarrhea:  Negative    Pain Assessment:  OME in 24 hours:  0  Location(s):      Modified Ria Scale:  0    Living Arrangements:  Lives with family    Psychosocial/Cultural: Not , 7 children  ages range from 2 -18 yo,  different mothers, disabled , lives with significant other in mother's home     Spiritual:  F - Margie and Belief:  Did not identify   A - Address in Care:  Not amenable to  visits     Advance Care Planning   Advance Directives:   Living Will: No        Oral Declaration: No    LaPOST: No    Medical Power of : No        Oral Declaration: No      Decision Making:  Family answered questions       Significant Labs: All pertinent labs within the past 24 hours have been reviewed.  CBC:   Recent Labs   Lab 06/16/22  0326   WBC 9.61   HGB 11.2*   HCT 33.4*   MCV 90        BMP:  Recent Labs   Lab 06/16/22  0326 06/16/22  1511   * 197*   * 134*   K 4.1 4.1   CL 91* 86*   CO2 31* 32*   BUN 20 26*   CREATININE 1.9* 1.9*   CALCIUM 10.3 10.3   MG 2.2  --      LFT:  Lab Results   Component Value Date    AST 25 06/16/2022    ALKPHOS 94 06/16/2022    BILITOT 1.1 (H) 06/16/2022     Albumin:   Albumin   Date Value Ref Range Status   06/16/2022 3.6 3.5 - 5.2 g/dL Final     Protein:   Total Protein   Date Value Ref Range Status   06/16/2022 7.9 6.0 - 8.4 g/dL Final     Lactic acid:   Lab Results   Component Value Date    LACTATE 1.1 06/16/2022    LACTATE 1.3 06/13/2022       Significant Imaging: I have reviewed all pertinent imaging results/findings within the past 24 hours.        > 50% of 70 min visit spent in chart review, face to  face discussion of goals of care,  symptom assessment, coordination of care and emotional support.    Additional 20 mins spent in advanced care planning     ELIZABETH Gentile, APRN, ACNS-BC  Palliative Medicine  [facility] - Cardiac Intensive [facility]

## 2022-06-17 LAB
ALBUMIN SERPL BCP-MCNC: 4 G/DL (ref 3.5–5.2)
ALLENS TEST: ABNORMAL
ALLENS TEST: NORMAL
ALP SERPL-CCNC: 111 U/L (ref 55–135)
ALT SERPL W/O P-5'-P-CCNC: 22 U/L (ref 10–44)
ANION GAP SERPL CALC-SCNC: 17 MMOL/L (ref 8–16)
ANION GAP SERPL CALC-SCNC: 17 MMOL/L (ref 8–16)
ANION GAP SERPL CALC-SCNC: 18 MMOL/L (ref 8–16)
APTT BLDCRRT: 43.8 SEC (ref 21–32)
AST SERPL-CCNC: 29 U/L (ref 10–40)
BASOPHILS # BLD AUTO: 0.04 K/UL (ref 0–0.2)
BASOPHILS NFR BLD: 0.5 % (ref 0–1.9)
BILIRUB SERPL-MCNC: 1.3 MG/DL (ref 0.1–1)
BNP SERPL-MCNC: 318 PG/ML (ref 0–99)
BUN SERPL-MCNC: 32 MG/DL (ref 6–20)
BUN SERPL-MCNC: 35 MG/DL (ref 6–20)
BUN SERPL-MCNC: 38 MG/DL (ref 6–20)
CALCIUM SERPL-MCNC: 10.3 MG/DL (ref 8.7–10.5)
CALCIUM SERPL-MCNC: 11.1 MG/DL (ref 8.7–10.5)
CALCIUM SERPL-MCNC: 11.2 MG/DL (ref 8.7–10.5)
CHLORIDE SERPL-SCNC: 80 MMOL/L (ref 95–110)
CHLORIDE SERPL-SCNC: 82 MMOL/L (ref 95–110)
CHLORIDE SERPL-SCNC: 83 MMOL/L (ref 95–110)
CO2 SERPL-SCNC: 31 MMOL/L (ref 23–29)
CO2 SERPL-SCNC: 33 MMOL/L (ref 23–29)
CO2 SERPL-SCNC: 36 MMOL/L (ref 23–29)
CREAT SERPL-MCNC: 2 MG/DL (ref 0.5–1.4)
CREAT SERPL-MCNC: 2.1 MG/DL (ref 0.5–1.4)
CREAT SERPL-MCNC: 2.2 MG/DL (ref 0.5–1.4)
DELSYS: ABNORMAL
DELSYS: NORMAL
DIFFERENTIAL METHOD: ABNORMAL
EOSINOPHIL # BLD AUTO: 0.1 K/UL (ref 0–0.5)
EOSINOPHIL NFR BLD: 1.5 % (ref 0–8)
ERYTHROCYTE [DISTWIDTH] IN BLOOD BY AUTOMATED COUNT: 14.3 % (ref 11.5–14.5)
EST. GFR  (AFRICAN AMERICAN): 42.7 ML/MIN/1.73 M^2
EST. GFR  (AFRICAN AMERICAN): 45.1 ML/MIN/1.73 M^2
EST. GFR  (AFRICAN AMERICAN): 47.9 ML/MIN/1.73 M^2
EST. GFR  (NON AFRICAN AMERICAN): 36.9 ML/MIN/1.73 M^2
EST. GFR  (NON AFRICAN AMERICAN): 39 ML/MIN/1.73 M^2
EST. GFR  (NON AFRICAN AMERICAN): 41.4 ML/MIN/1.73 M^2
FLOW: 4
FLOW: 4
GLUCOSE SERPL-MCNC: 150 MG/DL (ref 70–110)
GLUCOSE SERPL-MCNC: 152 MG/DL (ref 70–110)
GLUCOSE SERPL-MCNC: 196 MG/DL (ref 70–110)
HCO3 UR-SCNC: 38.1 MMOL/L (ref 24–28)
HCO3 UR-SCNC: 39.7 MMOL/L (ref 24–28)
HCO3 UR-SCNC: 42.7 MMOL/L (ref 24–28)
HCT VFR BLD AUTO: 36.8 % (ref 40–54)
HGB BLD-MCNC: 12.6 G/DL (ref 14–18)
IMM GRANULOCYTES # BLD AUTO: 0.01 K/UL (ref 0–0.04)
IMM GRANULOCYTES NFR BLD AUTO: 0.1 % (ref 0–0.5)
LDH SERPL L TO P-CCNC: 0.91 MMOL/L (ref 0.5–2.2)
LEFT AXILLARY ARTERY: 0.56 CM/S
LEFT DIST SUBCLAVIAN ARTERY: 0.59 CM
LEFT MID SUBCLAVIAN ARTERY: 0.72 CM/S
LEFT PROX SUBCLAVIAN ARTERY: 0.78 CM/S
LYMPHOCYTES # BLD AUTO: 1.7 K/UL (ref 1–4.8)
LYMPHOCYTES NFR BLD: 21.5 % (ref 18–48)
MAGNESIUM SERPL-MCNC: 2.7 MG/DL (ref 1.6–2.6)
MCH RBC QN AUTO: 29.7 PG (ref 27–31)
MCHC RBC AUTO-ENTMCNC: 34.2 G/DL (ref 32–36)
MCV RBC AUTO: 87 FL (ref 82–98)
METHEMOGLOBIN: 0.7 % (ref 0–3)
MODE: ABNORMAL
MODE: NORMAL
MONOCYTES # BLD AUTO: 0.8 K/UL (ref 0.3–1)
MONOCYTES NFR BLD: 9.6 % (ref 4–15)
NEUTROPHILS # BLD AUTO: 5.4 K/UL (ref 1.8–7.7)
NEUTROPHILS NFR BLD: 66.8 % (ref 38–73)
NRBC BLD-RTO: 0 /100 WBC
PCO2 BLDA: 62.6 MMHG (ref 35–45)
PCO2 BLDA: 65 MMHG (ref 35–45)
PCO2 BLDA: 66 MMHG (ref 35–45)
PH SMN: 7.39 [PH] (ref 7.35–7.45)
PH SMN: 7.39 [PH] (ref 7.35–7.45)
PH SMN: 7.43 [PH] (ref 7.35–7.45)
PHOSPHATE SERPL-MCNC: 5.6 MG/DL (ref 2.7–4.5)
PLATELET # BLD AUTO: 338 K/UL (ref 150–450)
PMV BLD AUTO: 9.8 FL (ref 9.2–12.9)
PO2 BLDA: 37 MMHG (ref 40–60)
PO2 BLDA: 37 MMHG (ref 40–60)
PO2 BLDA: 44 MMHG (ref 40–60)
POC BE: 13 MMOL/L
POC BE: 15 MMOL/L
POC BE: 18 MMOL/L
POC SATURATED O2: 67 % (ref 95–100)
POC SATURATED O2: 70 % (ref 95–100)
POC SATURATED O2: 77 % (ref 95–100)
POC TCO2: 40 MMOL/L (ref 24–29)
POC TCO2: 42 MMOL/L (ref 24–29)
POC TCO2: 45 MMOL/L (ref 24–29)
POCT GLUCOSE: 153 MG/DL (ref 70–110)
POCT GLUCOSE: 170 MG/DL (ref 70–110)
POCT GLUCOSE: 179 MG/DL (ref 70–110)
POCT GLUCOSE: 219 MG/DL (ref 70–110)
POCT GLUCOSE: 234 MG/DL (ref 70–110)
POTASSIUM SERPL-SCNC: 3.7 MMOL/L (ref 3.5–5.1)
POTASSIUM SERPL-SCNC: 4 MMOL/L (ref 3.5–5.1)
POTASSIUM SERPL-SCNC: 4.5 MMOL/L (ref 3.5–5.1)
PROT SERPL-MCNC: 8.2 G/DL (ref 6–8.4)
RBC # BLD AUTO: 4.24 M/UL (ref 4.6–6.2)
RIGHT MID SUBCLAVIAN ARTERY: 0.67 CM
RIGHT PROX SUBCLAVIAN ARTERY: 0.68 CM
SAMPLE: ABNORMAL
SAMPLE: NORMAL
SITE: ABNORMAL
SITE: NORMAL
SODIUM SERPL-SCNC: 130 MMOL/L (ref 136–145)
SODIUM SERPL-SCNC: 131 MMOL/L (ref 136–145)
SODIUM SERPL-SCNC: 136 MMOL/L (ref 136–145)
UPPER ARTERIAL LEFT ARM AXILLARY SYS MAX: 51 CM/S
UPPER ARTERIAL LEFT ARM SUBCLAVIAN SYS MAX: 57 CM/S
UPPER ARTERIAL RIGHT ARM AXILLARY SYS MAX: 39 CM/S
UPPER ARTERIAL RIGHT ARM SUBCLAVIAN SYS MAX: 71 CM/S
WBC # BLD AUTO: 8.06 K/UL (ref 3.9–12.7)

## 2022-06-17 PROCEDURE — 80048 BASIC METABOLIC PNL TOTAL CA: CPT | Mod: NTX,XB | Performed by: PHYSICIAN ASSISTANT

## 2022-06-17 PROCEDURE — 25000003 PHARM REV CODE 250: Mod: NTX | Performed by: INTERNAL MEDICINE

## 2022-06-17 PROCEDURE — 27000202 HC IABP, ADD'L DAY: Mod: NTX

## 2022-06-17 PROCEDURE — 99497 PR ADVNCD CARE PLAN 30 MIN: ICD-10-PCS | Mod: NTX,,, | Performed by: CLINICAL NURSE SPECIALIST

## 2022-06-17 PROCEDURE — 63600175 PHARM REV CODE 636 W HCPCS: Mod: NTX | Performed by: INTERNAL MEDICINE

## 2022-06-17 PROCEDURE — 94761 N-INVAS EAR/PLS OXIMETRY MLT: CPT | Mod: NTX

## 2022-06-17 PROCEDURE — 83050 HGB METHEMOGLOBIN QUAN: CPT | Mod: NTX

## 2022-06-17 PROCEDURE — 85025 COMPLETE CBC W/AUTO DIFF WBC: CPT | Mod: NTX | Performed by: INTERNAL MEDICINE

## 2022-06-17 PROCEDURE — 80053 COMPREHEN METABOLIC PANEL: CPT | Mod: NTX | Performed by: NURSE PRACTITIONER

## 2022-06-17 PROCEDURE — 80048 BASIC METABOLIC PNL TOTAL CA: CPT | Mod: 91,NTX,XB | Performed by: INTERNAL MEDICINE

## 2022-06-17 PROCEDURE — 99292 CRITICAL CARE ADDL 30 MIN: CPT | Mod: NTX,,, | Performed by: PHYSICIAN ASSISTANT

## 2022-06-17 PROCEDURE — 63600175 PHARM REV CODE 636 W HCPCS: Mod: NTX | Performed by: NURSE PRACTITIONER

## 2022-06-17 PROCEDURE — 99291 CRITICAL CARE FIRST HOUR: CPT | Mod: NTX,,, | Performed by: INTERNAL MEDICINE

## 2022-06-17 PROCEDURE — 83880 ASSAY OF NATRIURETIC PEPTIDE: CPT | Mod: NTX | Performed by: INTERNAL MEDICINE

## 2022-06-17 PROCEDURE — 25000003 PHARM REV CODE 250: Mod: NTX | Performed by: NURSE PRACTITIONER

## 2022-06-17 PROCEDURE — 83735 ASSAY OF MAGNESIUM: CPT | Mod: NTX | Performed by: INTERNAL MEDICINE

## 2022-06-17 PROCEDURE — 85730 THROMBOPLASTIN TIME PARTIAL: CPT | Mod: NTX | Performed by: INTERNAL MEDICINE

## 2022-06-17 PROCEDURE — 63600367 HC NITRIC OXIDE PER HOUR: Mod: NTX

## 2022-06-17 PROCEDURE — 99900035 HC TECH TIME PER 15 MIN (STAT): Mod: NTX

## 2022-06-17 PROCEDURE — 99291 PR CRITICAL CARE, E/M 30-74 MINUTES: ICD-10-PCS | Mod: NTX,,, | Performed by: INTERNAL MEDICINE

## 2022-06-17 PROCEDURE — 99292 PR CRITICAL CARE, ADDL 30 MIN: ICD-10-PCS | Mod: NTX,,, | Performed by: PHYSICIAN ASSISTANT

## 2022-06-17 PROCEDURE — 20000000 HC ICU ROOM: Mod: NTX

## 2022-06-17 PROCEDURE — 82803 BLOOD GASES ANY COMBINATION: CPT | Mod: NTX

## 2022-06-17 PROCEDURE — 25000003 PHARM REV CODE 250: Mod: NTX | Performed by: PHYSICIAN ASSISTANT

## 2022-06-17 PROCEDURE — 84100 ASSAY OF PHOSPHORUS: CPT | Mod: NTX | Performed by: INTERNAL MEDICINE

## 2022-06-17 PROCEDURE — 99497 ADVNCD CARE PLAN 30 MIN: CPT | Mod: NTX,,, | Performed by: CLINICAL NURSE SPECIALIST

## 2022-06-17 PROCEDURE — 63600175 PHARM REV CODE 636 W HCPCS: Mod: NTX | Performed by: PHYSICIAN ASSISTANT

## 2022-06-17 PROCEDURE — 27000221 HC OXYGEN, UP TO 24 HOURS: Mod: NTX

## 2022-06-17 RX ORDER — MORPHINE SULFATE 2 MG/ML
1 INJECTION, SOLUTION INTRAMUSCULAR; INTRAVENOUS ONCE
Status: COMPLETED | OUTPATIENT
Start: 2022-06-17 | End: 2022-06-17

## 2022-06-17 RX ORDER — POTASSIUM CHLORIDE 750 MG/1
30 CAPSULE, EXTENDED RELEASE ORAL ONCE
Status: COMPLETED | OUTPATIENT
Start: 2022-06-17 | End: 2022-06-17

## 2022-06-17 RX ADMIN — METHOCARBAMOL TABLETS 500 MG: 500 TABLET, COATED ORAL at 08:06

## 2022-06-17 RX ADMIN — METHOCARBAMOL TABLETS 500 MG: 500 TABLET, COATED ORAL at 04:06

## 2022-06-17 RX ADMIN — POTASSIUM CHLORIDE 40 MEQ: 1500 TABLET, EXTENDED RELEASE ORAL at 08:06

## 2022-06-17 RX ADMIN — MILRINONE LACTATE IN DEXTROSE 0.38 MCG/KG/MIN: 200 INJECTION, SOLUTION INTRAVENOUS at 03:06

## 2022-06-17 RX ADMIN — SUCRALFATE 1 G: 1 TABLET ORAL at 09:06

## 2022-06-17 RX ADMIN — MUPIROCIN: 20 OINTMENT TOPICAL at 09:06

## 2022-06-17 RX ADMIN — SENNOSIDES AND DOCUSATE SODIUM 1 TABLET: 50; 8.6 TABLET ORAL at 08:06

## 2022-06-17 RX ADMIN — FUROSEMIDE 40 MG/HR: 10 INJECTION, SOLUTION INTRAMUSCULAR; INTRAVENOUS at 06:06

## 2022-06-17 RX ADMIN — Medication 400 MG: at 09:06

## 2022-06-17 RX ADMIN — INSULIN ASPART 1 UNITS: 100 INJECTION, SOLUTION INTRAVENOUS; SUBCUTANEOUS at 09:06

## 2022-06-17 RX ADMIN — POTASSIUM CHLORIDE 30 MEQ: 10 CAPSULE, COATED, EXTENDED RELEASE ORAL at 06:06

## 2022-06-17 RX ADMIN — BUSPIRONE HYDROCHLORIDE 7.5 MG: 5 TABLET ORAL at 09:06

## 2022-06-17 RX ADMIN — MILRINONE LACTATE IN DEXTROSE 0.38 MCG/KG/MIN: 200 INJECTION, SOLUTION INTRAVENOUS at 08:06

## 2022-06-17 RX ADMIN — SODIUM CHLORIDE: 9 INJECTION, SOLUTION INTRAVENOUS at 06:06

## 2022-06-17 RX ADMIN — METHOCARBAMOL TABLETS 500 MG: 500 TABLET, COATED ORAL at 09:06

## 2022-06-17 RX ADMIN — HEPARIN SODIUM 21 UNITS/KG/HR: 5000 INJECTION INTRAVENOUS; SUBCUTANEOUS at 08:06

## 2022-06-17 RX ADMIN — BISACODYL 10 MG: 10 SUPPOSITORY RECTAL at 10:06

## 2022-06-17 RX ADMIN — MUPIROCIN: 20 OINTMENT TOPICAL at 08:06

## 2022-06-17 RX ADMIN — PANTOPRAZOLE SODIUM 40 MG: 40 TABLET, DELAYED RELEASE ORAL at 08:06

## 2022-06-17 RX ADMIN — MIRTAZAPINE 15 MG: 15 TABLET, FILM COATED ORAL at 09:06

## 2022-06-17 RX ADMIN — EPINEPHRINE 0.02 MCG/KG/MIN: 1 INJECTION INTRAMUSCULAR; INTRAVENOUS; SUBCUTANEOUS at 06:06

## 2022-06-17 RX ADMIN — ASPIRIN 81 MG CHEWABLE TABLET 81 MG: 81 TABLET CHEWABLE at 08:06

## 2022-06-17 RX ADMIN — POLYETHYLENE GLYCOL 3350 17 G: 17 POWDER, FOR SOLUTION ORAL at 08:06

## 2022-06-17 RX ADMIN — MORPHINE SULFATE 1 MG: 2 INJECTION, SOLUTION INTRAMUSCULAR; INTRAVENOUS at 12:06

## 2022-06-17 RX ADMIN — POTASSIUM CHLORIDE 40 MEQ: 1500 TABLET, EXTENDED RELEASE ORAL at 09:06

## 2022-06-17 RX ADMIN — BUSPIRONE HYDROCHLORIDE 7.5 MG: 5 TABLET ORAL at 08:06

## 2022-06-17 RX ADMIN — HYDROCODONE BITARTRATE AND ACETAMINOPHEN 2 TABLET: 5; 325 TABLET ORAL at 09:06

## 2022-06-17 RX ADMIN — METHOCARBAMOL TABLETS 500 MG: 500 TABLET, COATED ORAL at 12:06

## 2022-06-17 RX ADMIN — ACETAMINOPHEN 650 MG: 325 TABLET ORAL at 02:06

## 2022-06-17 RX ADMIN — HYDROCODONE BITARTRATE AND ACETAMINOPHEN 2 TABLET: 5; 325 TABLET ORAL at 02:06

## 2022-06-17 NOTE — PROGRESS NOTES
06/17/2022  Casey Arizmendi    Current provider:  Josh Pulido MD    Device interrogation:  No flowsheet data found.       Rounded on Kevan Bernice to ensure all mechanical assist device settings (IABP or VAD) were appropriate and all parameters were within limits.  I was able to ensure all back up equipment was present, the staff had no issues, and the Perfusion Department daily rounding was complete.      For implantable VADs: Interrogation of Ventricular assist device was performed with analysis of device parameters and review of device function. I have personally reviewed the interrogation findings and agree with findings as stated.     In emergency, the nursing units have been notified to contact the perfusion department either by:  Calling [id number] from 630am to 4pm Mon thru Fri, utilizing the On-Call Finder functionality of Epic and searching for Perfusion, or by contacting the hospital  from 4pm to 630am and on weekends and asking to speak with the perfusionist on call.    1:42 PM

## 2022-06-17 NOTE — PLAN OF CARE
CICU DAILY GOALS       A: Awake    RASS: Goal - RASS Goal: 0-->alert and calm  Actual - RASS (Diggs Agitation-Sedation Scale): 0-->alert and calm   Restraint necessity:  NA  B: Breath   SBT: Not intubated   C: Coordinate A & B, analgesics/sedatives   Pain: managed    SAT: Not intubated  D: Delirium   CAM-ICU: Overall CAM-ICU: Negative  E: Early(intubated/ Progressive (non-intubated) Mobility   MOVE Screen: Pass   Activity: Activity Management: Rolling - L1  FAS: Feeding/Nutrition   Diet order: Diet/Nutrition Received: consistent carb/diabetic diet,   Fluid restriction: Fluid Requirement: 1500cc FR  T: Thrombus   DVT prophylaxis: VTE Required Core Measure: Pharmacological prophylaxis initiated/maintained  H: HOB Elevation   Head of Bed (HOB) Positioning: HOB at 20 degrees  U: Ulcer Prophylaxis   GI: yes  G: Glucose control   managed Glycemic Management: blood glucose monitored  S: Skin   Bundle compliance: yes   Bathing/Skin Care: bath, complete Date: 06/16/2022  B: Bowel Function   no issues   I: Indwelling Catheters   Cartwright necessity:     CVC necessity: No3   IPAD offered: No  D: De-escalation Antibx   NO  Plan for the day   CVP 12,7,7   SVO2 55,70   IABP in place. Pedal pulses present and palpable. One time does of muscle relaxant administered for muscle cramps.    Family/Goals of care/Code Status   Code Status: Full Code     No acute events throughout day, VS and assessment per flow sheet, patient progressing towards goals as tolerated, plan of care reviewed with Kevan Queen and family, all concerns addressed, will continue to monitor.

## 2022-06-17 NOTE — PLAN OF CARE
Advance Care Planning   [facility] - [facility]  Palliative Care       Patient Name: Kevan Queen  MRN: [id number]  Admission Date: 6/13/2022  Hospital Length of Stay: 4 days  Code Status: Full Code   Attending Provider: Josh Pulido MD  Palliative Care Provider: ELIZABETH Gentile, APRN, ACNS-BC  Primary Care Physician: ORALIA Cline  Principal Problem:Acute on chronic combined systolic and diastolic heart failure, NYHA class 4     Asked by [facility] APRN to assist with completing MPOA form.      Power of   We initiated the process of advance care planning today and explained the importance of this process to the patient.  We re-introduced the concept of advance directives to the patient, as well. Then the patient received detailed information about the importance of designating a Health Care Power of  (HCPOA). He was also instructed to communicate with this person about their wishes for future healthcare, should he become sick and lose decision-making capacity. The patient has not previously appointed a HCPOA. After our discussion, the patient has decided to complete a HCPOA and has appointed his mother, health care agent: Malou Tim & health care agent number: [phone] I spent a total time of  minutes discussing this issue with the patient.     2nd POA/SO: Darlyn (Page) Kyle: [phone]    Forms scanned in chart and copies given to pt/SO.    Liliana Sutherland, DARLENE, [facility]-

## 2022-06-17 NOTE — SUBJECTIVE & OBJECTIVE
Interval History: Patient reports no new complaints this morning.  He is net negative 4L in the last 24 hours on Lasix at 40mg/hr.  CVP: 5, SVO2: 70, CO: 7.54, CI: 3.24, SVR: 880.  Creatinine is up to 2.0 this morning.  Will hold Lasix drip this am and monitor response.  Will re check bmp around lunch.       Continuous Infusions:   sodium chloride 0.9% 5 mL/hr at 06/17/22 0905    EPINEPHrine 0.02 mcg/kg/min (06/17/22 0905)    furosemide (LASIX) 10 mg/mL infusion (non-titrating) Stopped (06/17/22 0904)    heparin (porcine) in D5W 21 Units/kg/hr (06/17/22 0905)    milrinone 20mg/100ml D5W (200mcg/ml) 0.375 mcg/kg/min (06/17/22 0905)    nitric oxide gas       Scheduled Meds:   acetaminophen  650 mg Oral Q8H    aspirin  81 mg Oral Daily    busPIRone  7.5 mg Oral Q12H    HYDROcodone-acetaminophen  2 tablet Oral Q8H    magnesium oxide  400 mg Oral BID    methocarbamoL  500 mg Oral QID    mirtazapine  15 mg Oral Nightly    mupirocin   Nasal BID    pantoprazole  40 mg Oral Daily    polyethylene glycol  17 g Oral BID    potassium chloride SA  40 mEq Oral BID    senna-docusate 8.6-50 mg  1 tablet Oral Daily    sucralfate  1 g Oral Nightly     PRN Meds:bisacodyL, dextrose 10%, dextrose 10%, glucagon (human recombinant), glucose, glucose, insulin aspart U-100, magnesium oxide, magnesium oxide, potassium bicarbonate, potassium bicarbonate, potassium bicarbonate, potassium, sodium phosphates, potassium, sodium phosphates, potassium, sodium phosphates, promethazine, sodium chloride 0.9%    Review of patient's allergies indicates:   Allergen Reactions    Bumex [bumetanide] Hives    Lactose Diarrhea     Other reaction(s): Abdominal distension, gaseous    Torsemide Hives     Objective:     Vital Signs (Most Recent):  Temp: 98.4 °F (36.9 °C) (06/17/22 0805)  Pulse: 107 (06/17/22 0905)  Resp: 18 (06/17/22 0905)  BP: 122/68 (06/17/22 0905)  SpO2: 100 % (06/17/22 0905)   Vital Signs (24h Range):  Temp:  [98 °F (36.7 °C)-98.8 °F (37.1  °C)] 98.4 °F (36.9 °C)  Pulse:  [100-115] 107  Resp:  [14-25] 18  SpO2:  [94 %-100 %] 100 %  BP: (106-147)/(59-79) 122/68     Patient Vitals for the past 72 hrs (Last 3 readings):   Weight   06/15/22 0300 102.5 kg (225 lb 15.5 oz)       Body mass index is 28.24 kg/m².      Intake/Output Summary (Last 24 hours) at 6/17/2022 1027  Last data filed at 6/17/2022 0905  Gross per 24 hour   Intake 1862.45 ml   Output 6400 ml   Net -4537.55 ml         Hemodynamic Parameters:       Telemetry: ST    Physical Exam  Constitutional:       Appearance: Normal appearance.   HENT:      Head: Normocephalic and atraumatic.   Eyes:      Conjunctiva/sclera: Conjunctivae normal.      Pupils: Pupils are equal, round, and reactive to light.   Neck:      Comments: RIJ TLC  Cardiovascular:      Rate and Rhythm: Regular rhythm. Tachycardia present.      Comments: +S3  IABP to R fem  Pulmonary:      Effort: Pulmonary effort is normal.      Breath sounds: Normal breath sounds.   Abdominal:      General: Bowel sounds are normal. There is distension.   Musculoskeletal:         General: Normal range of motion.      Cervical back: Neck supple.      Comments: Trace TREVER   Skin:     General: Skin is warm and dry.      Capillary Refill: Capillary refill takes 2 to 3 seconds.   Neurological:      General: No focal deficit present.      Mental Status: He is alert and oriented to person, place, and time.   Psychiatric:         Mood and Affect: Mood normal.         Behavior: Behavior normal.         Thought Content: Thought content normal.         Judgment: Judgment normal.       Significant Labs:  CBC:  Recent Labs   Lab 06/15/22  0323 06/15/22  1104 06/15/22  2018 06/16/22  0326 06/17/22  0332   WBC 9.34  --   --  9.61 8.06   RBC 3.42*  --   --  3.70* 4.24*   HGB 10.4*  --   --  11.2* 12.6*   HCT 31.5*   < > 37 33.4* 36.8*     --   --  295 338   MCV 92  --   --  90 87   MCH 30.4  --   --  30.3 29.7   MCHC 33.0  --   --  33.5 34.2    < > = values in  this interval not displayed.       BNP:  Recent Labs   Lab 06/13/22  0901 06/17/22  0332   BNP 1,701* 318*       CMP:  Recent Labs   Lab 06/15/22  0323 06/16/22  0326 06/16/22  1511 06/17/22  0332   * 190* 197* 152*   CALCIUM 9.0 10.3 10.3 10.3   ALBUMIN 3.7 3.6  --  4.0   PROT 7.1 7.9  --  8.2   * 134* 134* 136   K 3.8 4.1 4.1 3.7   CO2 28 31* 32* 36*   CL 94* 91* 86* 83*   BUN 15 20 26* 32*   CREATININE 1.4 1.9* 1.9* 2.0*   ALKPHOS 81 94  --  111   ALT 25 23  --  22   AST 28 25  --  29   BILITOT 1.1* 1.1*  --  1.3*        Coagulation:   Recent Labs   Lab 06/13/22  2150 06/14/22  0549 06/16/22  0019 06/16/22  0609 06/17/22  0332   INR 1.2  --   --   --   --    APTT 26.8   < > 41.2* 44.0* 43.8*    < > = values in this interval not displayed.       LDH:  No results for input(s): LDH in the last 72 hours.  Microbiology:  Microbiology Results (last 7 days)       ** No results found for the last 168 hours. **            I have reviewed all pertinent labs within the past 24 hours.    Estimated Creatinine Clearance: 65.6 mL/min (A) (based on SCr of 2 mg/dL (H)).    Diagnostic Results:  I have reviewed and interpreted all pertinent imaging results/findings within the past 24 hours.

## 2022-06-17 NOTE — PLAN OF CARE
Cardiac ICU Care Plan    POC reviewed with Kevan Bernice and family. Questions and concerns addressed. Lasix gtt d/c'd. Pt awaiting LVAD. Pt progressing toward goals. Will continue to monitor. See below and flowsheets for full assessment and VS info.       Neuro:  [place] Coma Scale  Best Eye Response: 4-->(E4) spontaneous  Best Motor Response: 6-->(M6) obeys commands  Best Verbal Response: 5-->(V5) oriented  [place] Coma Scale Score: 15  Assessment Qualifiers: patient not sedated/intubated  Pupil PERRLA: yes    24 hr Temp:  [98 °F (36.7 °C)-98.4 °F (36.9 °C)]      CV:  Rhythm: sinus tachycardia   DVT prophylaxis: VTE Required Core Measure: Pharmacological prophylaxis initiated/maintained    CVP (mean): (S) 4 mmHg (06/17/22 1605)       SVO2 (%): 55 % (06/16/22 1901)    IABP Mode: Auto  IABP Rate: 1:1  IABP Trigger: ECG  IABP Augmented Diastolic Pressure: 102          Pulses  Right Radial Pulse: 2+ (normal)  Left Radial Pulse: 2+ (normal)  Right Dorsalis Pedis Pulse: 2+ (normal)  Left Dorsalis Pedis Pulse: 2+ (normal)  Right Posterior Tibial Pulse: 1+ (weak)  Left Posterior Tibial Pulse: 1+ (weak)    Resp:  O2 Device (Oxygen Therapy): nasal cannula w/ humidification  Flow (L/min): 4  Oxygen Concentration (%): 36    GI/:  GI prophylaxis: yes  Diet/Nutrition Received: consistent carb/diabetic diet  Last Bowel Movement: 06/17/22  Voiding Characteristics: external catheter   Intake/Output Summary (Last 24 hours) at 6/17/2022 1826  Last data filed at 6/17/2022 1805  Gross per 24 hour   Intake 2092.75 ml   Output 3875 ml   Net -1782.25 ml          Labs/Accuchecks:  Recent Labs   Lab 06/15/22  0323 06/15/22  1104 06/15/22  2018 06/16/22  0326 06/17/22  0332   WBC 9.34  --   --  9.61 8.06   RBC 3.42*  --   --  3.70* 4.24*   HGB 10.4*  --   --  11.2* 12.6*   HCT 31.5*   < > 37 33.4* 36.8*     --   --  295 338    < > = values in this interval not displayed.      Recent Labs   Lab 06/13/22  2150 06/14/22  0551  06/16/22  0019 06/16/22  0609 06/17/22  0332   INR 1.2  --   --   --   --    APTT 26.8   < > 41.2* 44.0* 43.8*    < > = values in this interval not displayed.      Recent Labs     06/17/22  0332 06/17/22  1233 06/17/22  1632      < > 130*   K 3.7   < > 4.5   CO2 36*   < > 31*   CL 83*   < > 82*   BUN 32*   < > 38*   CREATININE 2.0*   < > 2.2*   ALKPHOS 111  --   --    ALT 22  --   --    AST 29  --   --    BILITOT 1.3*  --   --     < > = values in this interval not displayed.       Recent Labs   Lab 06/13/22  1038 06/13/22  1352 06/13/22  1748   TROPONINI 0.249* 0.242* 0.241*      Recent Labs     06/16/22  2000 06/17/22  0430 06/17/22  1625   PH 7.407 7.426 7.387   PCO2 66.4* 65.0* 66.0*   PO2 30* 37* 37*   HCO3 41.8* 42.7* 39.7*   POCSATURATED 55* 70* 67*   BE 17 18 15       Electrolytes: Electrolytes replaced  Accuchecks: ACHS    Gtts/LDAs:   sodium chloride 0.9% 5 mL/hr at 06/17/22 1805    EPINEPHrine 0.02 mcg/kg/min (06/17/22 1805)    heparin (porcine) in D5W 21 Units/kg/hr (06/17/22 1805)    milrinone 20mg/100ml D5W (200mcg/ml) 0.375 mcg/kg/min (06/17/22 1805)    nitric oxide gas         Lines/Drains/Airways       Peripherally Inserted Central Catheter Line  Duration             PICC Double Lumen 11/01/21 right brachial 228 days              Central Venous Catheter Line  Duration             Percutaneous Central Line Insertion/Assessment - Triple Lumen  06/13/22 2145 right internal jugular 3 days              Drain  Duration             Male External Urinary Catheter 06/14/22 0000 3 days              Line  Duration                  IABP 06/13/22 2229 8.0 Fr. 40 mL 3 days                    Skin/Wounds  Bathing/Skin Care: bath, complete (06/17/22 1030)  Wounds: No

## 2022-06-17 NOTE — CARE UPDATE
Transplant Update Note    Hemodynamics at 8 pm    CVP 12  SvO2 55  CO 5.56  CI 2.53      No changes in management for now.     Please call with questions or concerns. Discussed with staff.     Rebel Sanderson MD  Heart Transplant PGY4  [facility]

## 2022-06-17 NOTE — ASSESSMENT & PLAN NOTE
-[facility]  -Approved for LVAD at [facility] on 4/2/22. Was not evaluated for OHTx as he quit smoking in April 2022  -Moved to ICU 6/13 in the setting of significant volume overloaded and low output state. IABP placed 6/13, Lasix increased to 40 mg/hr and Epi and Marcella added to Primacor. sVO2 has improved at 51 with CO/CI 5.29/2.31 and   -on Lasix drip at 40mg/hr with prn Metolazone.  Patient is net negative 4.1L in the last 24 hours  -CVP: 5, SVO2: 70, CO:7.54, CI:3.24, SVR: 880.  Will hold Lasix drip this monring and monitor response.   -IABP 1:1, IV milrinone 0.375, epi 0.02, and Marcella 10 ppm.   -TTE 6/14: LVEF 10%, G3DD, mild-moderate reduced RVSF, moderate-severe MR, moderate-sever tr, CVP 15, PASP 56, moderate PH, LVEDD 7.59  -RHC done 4/19/22 on Milrinone 0.5 mcg/kg/min: RA 11, PA 50/27 (35), W 27, CO/CI 3.7/1.7, PVR 2.2 and SVR 1535   -GDMT: Entresto and Aldactone on hold since admit 5/24-5/27 2/2 elevated sCr  -Reports dry weight is 217#, and that he had gained 6# on his home scale  -May need LVAD this admit. Unlikely OHTx candidate with smoking history (quit April 2022)

## 2022-06-17 NOTE — PROGRESS NOTES
Patient seen for f/u of pre-VAD education. Pt is in bed, on IABP support, AAO, s/o Page is at bedside. No further questions at this time. Will continue to follow.

## 2022-06-17 NOTE — PROGRESS NOTES
[facility] - Cardiac Intensive Care  Palliative Medicine  Progress Note    Patient Name: Kevan Queen  MRN: [id number]  Admission Date: 6/13/2022  Hospital Length of Stay: 4 days  Code Status: Full Code   Attending Provider: Josh Pulido MD  Consulting Provider: DENZEL Coppola  Primary Care Physician: ORALIA Cline  Principal Problem:Acute on chronic combined systolic and diastolic heart failure, NYHA class 4    Patient information was obtained from patient and primary team.      Assessment/Plan:     Palliative care encounter  Pal med consulted for pre-VAD goals of care for Mr. Queen a 38 yo gentleman admitted with acute on chronic combined systolic and diastolic heart failure.    Undergoing evaluation of advanced therapy options - LVAD.  He is awake, alert and oriented to person, place, time and situation.  Currently requires pressor support with epinephrine, in addition to milrinone, lasix and heparin drips.  Inhaled nitric oxide 10 ppm and IABP at 1:2.  Reports no pain or discomforts     Advance Care Planning     -   Power of     Pal Med APRN and LCSW returned to bedside to follow up on previous conversations regarding surrogate decision maker.  We explained the importance of this process to Mr. Gaxiola and significant other Niharika Wright. Discussed detailed information about the importance of designating a Health Care Power of  (HCPOA). Mr. Queen  was also instructed to communicate with this person about their wishes for future healthcare, should he become sick and lose decision-making capacity. After our discussion, the patient completed a HCPOA indcating his mother Janeth Dumont  [phone].   20 minutes spent in advanced care planning and completion of HPOA documents. Original returned to patient and copy obtained for EMR.                 I will sign off. Please contact us if you have any additional questions.    Subjective:     Chief Complaint:   Chief Complaint    Patient presents with    Chest Pain     Chf on primacor drip,        HPI:   HPI obtained from chart review:            Hospital Course:  No notes on file    No new subjective & objective note has been filed under this hospital service since the last note was generated.      Noreen Frey, CNS  Palliative Medicine  [facility] - Cardiac Intensive Care

## 2022-06-17 NOTE — ASSESSMENT & PLAN NOTE
Pal med consulted for pre-VAD goals of care for Mr. Queen a 38 yo gentleman admitted with acute on chronic combined systolic and diastolic heart failure.    Undergoing evaluation of advanced therapy options - LVAD.  He is awake, alert and oriented to person, place, time and situation.  Currently requires pressor support with epinephrine, in addition to milrinone, lasix and heparin drips.  Inhaled nitric oxide 10 ppm and IABP at 1:2.  Reports no pain or discomforts     Advance Care Planning     -   Power of     Jerome Bagley APRN and LCSW returned to bedside to follow up on previous conversations regarding surrogate decision maker.  We explained the importance of this process to Mr. Gaxiola and significant other Niharika Kyle. Discussed detailed information about the importance of designating a Health Care Power of  (HCPOA). Mr. Queen  was also instructed to communicate with this person about their wishes for future healthcare, should he become sick and lose decision-making capacity. After our discussion, the patient completed a HCPOA indcating his mother Janeth Dumont  [phone].   20 minutes spent in advanced care planning and completion of HPOA documents. Original returned to patient and copy obtained for EMR.

## 2022-06-17 NOTE — PROGRESS NOTES
[facility] - Cardiac Intensive Care  Heart Transplant  Progress Note    Patient Name: Kevan Queen  MRN: [id number]  Admission Date: 6/13/2022  Hospital Length of Stay: 4 days  Attending Physician: Josh Pulido MD  Primary Care Provider: ORALIA Cline  Principal Problem:Acute on chronic combined systolic and diastolic heart failure, NYHA class 4    Subjective:     Interval History: Patient reports no new complaints this morning.  He is net negative 4L in the last 24 hours on Lasix at 40mg/hr.  CVP: 5, SVO2: 70, CO: 7.54, CI: 3.24, SVR: 880.  Creatinine is up to 2.0 this morning.  Will hold Lasix drip this am and monitor response.  Will re check bmp around lunch.       Continuous Infusions:   sodium chloride 0.9% 5 mL/hr at 06/17/22 0905    EPINEPHrine 0.02 mcg/kg/min (06/17/22 0905)    furosemide (LASIX) 10 mg/mL infusion (non-titrating) Stopped (06/17/22 0904)    heparin (porcine) in D5W 21 Units/kg/hr (06/17/22 0905)    milrinone 20mg/100ml D5W (200mcg/ml) 0.375 mcg/kg/min (06/17/22 0905)    nitric oxide gas       Scheduled Meds:   acetaminophen  650 mg Oral Q8H    aspirin  81 mg Oral Daily    busPIRone  7.5 mg Oral Q12H    HYDROcodone-acetaminophen  2 tablet Oral Q8H    magnesium oxide  400 mg Oral BID    methocarbamoL  500 mg Oral QID    mirtazapine  15 mg Oral Nightly    mupirocin   Nasal BID    pantoprazole  40 mg Oral Daily    polyethylene glycol  17 g Oral BID    potassium chloride SA  40 mEq Oral BID    senna-docusate 8.6-50 mg  1 tablet Oral Daily    sucralfate  1 g Oral Nightly     PRN Meds:bisacodyL, dextrose 10%, dextrose 10%, glucagon (human recombinant), glucose, glucose, insulin aspart U-100, magnesium oxide, magnesium oxide, potassium bicarbonate, potassium bicarbonate, potassium bicarbonate, potassium, sodium phosphates, potassium, sodium phosphates, potassium, sodium phosphates, promethazine, sodium chloride 0.9%    Review of patient's allergies indicates:    Allergen Reactions    Bumex [bumetanide] Hives    Lactose Diarrhea     Other reaction(s): Abdominal distension, gaseous    Torsemide Hives     Objective:     Vital Signs (Most Recent):  Temp: 98.4 °F (36.9 °C) (06/17/22 0805)  Pulse: 107 (06/17/22 0905)  Resp: 18 (06/17/22 0905)  BP: 122/68 (06/17/22 0905)  SpO2: 100 % (06/17/22 0905)   Vital Signs (24h Range):  Temp:  [98 °F (36.7 °C)-98.8 °F (37.1 °C)] 98.4 °F (36.9 °C)  Pulse:  [100-115] 107  Resp:  [14-25] 18  SpO2:  [94 %-100 %] 100 %  BP: (106-147)/(59-79) 122/68     Patient Vitals for the past 72 hrs (Last 3 readings):   Weight   06/15/22 0300 102.5 kg (225 lb 15.5 oz)       Body mass index is 28.24 kg/m².      Intake/Output Summary (Last 24 hours) at 6/17/2022 1027  Last data filed at 6/17/2022 0905  Gross per 24 hour   Intake 1862.45 ml   Output 6400 ml   Net -4537.55 ml         Hemodynamic Parameters:       Telemetry: ST    Physical Exam  Constitutional:       Appearance: Normal appearance.   HENT:      Head: Normocephalic and atraumatic.   Eyes:      Conjunctiva/sclera: Conjunctivae normal.      Pupils: Pupils are equal, round, and reactive to light.   Neck:      Comments: RIJ TLC  Cardiovascular:      Rate and Rhythm: Regular rhythm. Tachycardia present.      Comments: +S3  IABP to R fem  Pulmonary:      Effort: Pulmonary effort is normal.      Breath sounds: Normal breath sounds.   Abdominal:      General: Bowel sounds are normal. There is distension.   Musculoskeletal:         General: Normal range of motion.      Cervical back: Neck supple.      Comments: Trace TERVER   Skin:     General: Skin is warm and dry.      Capillary Refill: Capillary refill takes 2 to 3 seconds.   Neurological:      General: No focal deficit present.      Mental Status: He is alert and oriented to person, place, and time.   Psychiatric:         Mood and Affect: Mood normal.         Behavior: Behavior normal.         Thought Content: Thought content normal.         Judgment:  Judgment normal.       Significant Labs:  CBC:  Recent Labs   Lab 06/15/22  0323 06/15/22  1104 06/15/22  2018 06/16/22 0326 06/17/22  0332   WBC 9.34  --   --  9.61 8.06   RBC 3.42*  --   --  3.70* 4.24*   HGB 10.4*  --   --  11.2* 12.6*   HCT 31.5*   < > 37 33.4* 36.8*     --   --  295 338   MCV 92  --   --  90 87   MCH 30.4  --   --  30.3 29.7   MCHC 33.0  --   --  33.5 34.2    < > = values in this interval not displayed.       BNP:  Recent Labs   Lab 06/13/22  0901 06/17/22  0332   BNP 1,701* 318*       CMP:  Recent Labs   Lab 06/15/22  0323 06/16/22  0326 06/16/22  1511 06/17/22  0332   * 190* 197* 152*   CALCIUM 9.0 10.3 10.3 10.3   ALBUMIN 3.7 3.6  --  4.0   PROT 7.1 7.9  --  8.2   * 134* 134* 136   K 3.8 4.1 4.1 3.7   CO2 28 31* 32* 36*   CL 94* 91* 86* 83*   BUN 15 20 26* 32*   CREATININE 1.4 1.9* 1.9* 2.0*   ALKPHOS 81 94  --  111   ALT 25 23  --  22   AST 28 25  --  29   BILITOT 1.1* 1.1*  --  1.3*        Coagulation:   Recent Labs   Lab 06/13/22  2150 06/14/22  0549 06/16/22  0019 06/16/22  0609 06/17/22  0332   INR 1.2  --   --   --   --    APTT 26.8   < > 41.2* 44.0* 43.8*    < > = values in this interval not displayed.       LDH:  No results for input(s): LDH in the last 72 hours.  Microbiology:  Microbiology Results (last 7 days)       ** No results found for the last 168 hours. **            I have reviewed all pertinent labs within the past 24 hours.    Estimated Creatinine Clearance: 65.6 mL/min (A) (based on SCr of 2 mg/dL (H)).    Diagnostic Results:  I have reviewed and interpreted all pertinent imaging results/findings within the past 24 hours.    Assessment and Plan:     38 yo male with NIDCM with BiV systolic heart failure, on home Milrinone at 0.375 mcg/kg/min, presented at [facility] 4/2/22 ,was not evaluated for OHT as he has recently quit smoking in April 2022 but was approved for VAD with plan to begin OHT evaluation in upcoming months if Mr Queen is stable and  "suitable for OHT eval (blood group A), 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 Went to ED at [facility] 6/10 but left after waiting 4 hours. Had clinic appointment with us today, but arrived to [facility] early this morning and decided to go to the ED instead. Baseline Lasix dose is 80 mg bid. Reports taking 240 mg qd past 3 days with no improvement. BNP is 1701, up from 898 on 6/2 and 49 on 5/24. sCr is 1.8 with baseline ~ 1.5-1.7. sPO2 on RA is 93%. Wife at bedside    He has been given Lasix 80 mg IVP in the ED with plan to start Lasix gtt at 20 mg/hr           * Acute on chronic combined systolic and diastolic heart failure, NYHA class 4  -NID  -Approved for LVAD at [facility] on 4/2/22. Was not evaluated for OHTx as he quit smoking in April 2022  -Moved to ICU 6/13 in the setting of significant volume overloaded and low output state. IABP placed 6/13, Lasix increased to 40 mg/hr and Epi and Marcella added to Primacor. sVO2 has improved at 51 with CO/CI 5.29/2.31 and   -on Lasix drip at 40mg/hr with prn Metolazone.  Patient is net negative 4.1L in the last 24 hours  -CVP: 5, SVO2: 70, CO:7.54, CI:3.24, SVR: 880.  Will hold Lasix drip this monring and monitor response.   -IABP 1:1, IV milrinone 0.375, epi 0.02, and Marcella 10 ppm.   -TTE 6/14: LVEF 10%, G3DD, mild-moderate reduced RVSF, moderate-severe MR, moderate-sever tr, CVP 15, PASP 56, moderate PH, LVEDD 7.59  -RHC done 4/19/22 on Milrinone 0.5 mcg/kg/min: RA 11, PA 50/27 (35), W 27, CO/CI 3.7/1.7, PVR 2.2 and SVR 1535   -GDMT: Entresto and Aldactone on hold since admit 5/24-5/27 2/2 elevated sCr  -Reports dry weight is 217#, and that he " had gained 6# on his home scale  -Plan for LVAD this admit. Unlikely OHTx candidate with smoking history (quit April 2022).  Awaiting tentative date from [facility]          Cardiogenic shock  -See A/C CHF    CKD (chronic kidney disease) stage 3, GFR 30-59 ml/min  -Admit sCr 1.8, baseline ~ 1.5-1.7  -Improved   -Monitor with diuresis    Muscle cramping  -Continue Percocet with muscle relaxant (EKG checked for QTC prolongation)   -Consider TCA if not improved    Uncontrolled diabetes mellitus  -Admitted 5/24-5/27 with hyperglycemia hyperosmolar syndrome  -Hgb A1C 9.2 on 5/20/22  -Glucose per labs today 169  -SSI for now    Uninterrupted Critical Care/Counseling Time (not including procedures): 60 minutes       DEYA Martinez  Heart Transplant  [facility] - Cardiac Intensive Care

## 2022-06-18 LAB
ALBUMIN SERPL BCP-MCNC: 3.5 G/DL (ref 3.5–5.2)
ALLENS TEST: ABNORMAL
ALP SERPL-CCNC: 114 U/L (ref 55–135)
ALT SERPL W/O P-5'-P-CCNC: 17 U/L (ref 10–44)
ANION GAP SERPL CALC-SCNC: 11 MMOL/L (ref 8–16)
ANION GAP SERPL CALC-SCNC: 9 MMOL/L (ref 8–16)
APTT BLDCRRT: 37.2 SEC (ref 21–32)
APTT BLDCRRT: 37.4 SEC (ref 21–32)
APTT BLDCRRT: 38.1 SEC (ref 21–32)
AST SERPL-CCNC: 27 U/L (ref 10–40)
BASOPHILS # BLD AUTO: 0.04 K/UL (ref 0–0.2)
BASOPHILS NFR BLD: 0.4 % (ref 0–1.9)
BILIRUB SERPL-MCNC: 0.9 MG/DL (ref 0.1–1)
BUN SERPL-MCNC: 38 MG/DL (ref 6–20)
BUN SERPL-MCNC: 40 MG/DL (ref 6–20)
CALCIUM SERPL-MCNC: 10.3 MG/DL (ref 8.7–10.5)
CALCIUM SERPL-MCNC: 10.3 MG/DL (ref 8.7–10.5)
CHLORIDE SERPL-SCNC: 85 MMOL/L (ref 95–110)
CHLORIDE SERPL-SCNC: 90 MMOL/L (ref 95–110)
CO2 SERPL-SCNC: 32 MMOL/L (ref 23–29)
CO2 SERPL-SCNC: 36 MMOL/L (ref 23–29)
CREAT SERPL-MCNC: 1.8 MG/DL (ref 0.5–1.4)
CREAT SERPL-MCNC: 2 MG/DL (ref 0.5–1.4)
DELSYS: ABNORMAL
DIFFERENTIAL METHOD: ABNORMAL
EOSINOPHIL # BLD AUTO: 0.1 K/UL (ref 0–0.5)
EOSINOPHIL NFR BLD: 1.1 % (ref 0–8)
ERYTHROCYTE [DISTWIDTH] IN BLOOD BY AUTOMATED COUNT: 14.2 % (ref 11.5–14.5)
EST. GFR  (AFRICAN AMERICAN): 47.9 ML/MIN/1.73 M^2
EST. GFR  (AFRICAN AMERICAN): 54.4 ML/MIN/1.73 M^2
EST. GFR  (NON AFRICAN AMERICAN): 41.4 ML/MIN/1.73 M^2
EST. GFR  (NON AFRICAN AMERICAN): 47 ML/MIN/1.73 M^2
FLOW: 4
GLUCOSE SERPL-MCNC: 220 MG/DL (ref 70–110)
GLUCOSE SERPL-MCNC: 265 MG/DL (ref 70–110)
HCO3 UR-SCNC: 36.8 MMOL/L (ref 24–28)
HCO3 UR-SCNC: 36.9 MMOL/L (ref 24–28)
HCO3 UR-SCNC: 37.4 MMOL/L (ref 24–28)
HCT VFR BLD AUTO: 37 % (ref 40–54)
HGB BLD-MCNC: 12.5 G/DL (ref 14–18)
IMM GRANULOCYTES # BLD AUTO: 0.03 K/UL (ref 0–0.04)
IMM GRANULOCYTES NFR BLD AUTO: 0.3 % (ref 0–0.5)
LACTATE SERPL-SCNC: 1.3 MMOL/L (ref 0.5–2.2)
LYMPHOCYTES # BLD AUTO: 1.3 K/UL (ref 1–4.8)
LYMPHOCYTES NFR BLD: 12.7 % (ref 18–48)
MAGNESIUM SERPL-MCNC: 2.7 MG/DL (ref 1.6–2.6)
MCH RBC QN AUTO: 29.8 PG (ref 27–31)
MCHC RBC AUTO-ENTMCNC: 33.8 G/DL (ref 32–36)
MCV RBC AUTO: 88 FL (ref 82–98)
METHEMOGLOBIN: 0.5 % (ref 0–3)
MODE: ABNORMAL
MONOCYTES # BLD AUTO: 1 K/UL (ref 0.3–1)
MONOCYTES NFR BLD: 9.8 % (ref 4–15)
NEUTROPHILS # BLD AUTO: 7.8 K/UL (ref 1.8–7.7)
NEUTROPHILS NFR BLD: 75.7 % (ref 38–73)
NRBC BLD-RTO: 0 /100 WBC
PCO2 BLDA: 64.6 MMHG (ref 35–45)
PCO2 BLDA: 64.7 MMHG (ref 35–45)
PCO2 BLDA: 65.6 MMHG (ref 35–45)
PH SMN: 7.36 [PH] (ref 7.35–7.45)
PH SMN: 7.36 [PH] (ref 7.35–7.45)
PH SMN: 7.37 [PH] (ref 7.35–7.45)
PLATELET # BLD AUTO: 363 K/UL (ref 150–450)
PMV BLD AUTO: 9.7 FL (ref 9.2–12.9)
PO2 BLDA: 31 MMHG (ref 40–60)
PO2 BLDA: 34 MMHG (ref 40–60)
PO2 BLDA: 39 MMHG (ref 40–60)
POC BE: 11 MMOL/L
POC BE: 11 MMOL/L
POC BE: 12 MMOL/L
POC SATURATED O2: 55 % (ref 95–100)
POC SATURATED O2: 61 % (ref 95–100)
POC SATURATED O2: 69 % (ref 95–100)
POC TCO2: 39 MMOL/L (ref 24–29)
POCT GLUCOSE: 221 MG/DL (ref 70–110)
POCT GLUCOSE: 277 MG/DL (ref 70–110)
POTASSIUM SERPL-SCNC: 3.8 MMOL/L (ref 3.5–5.1)
POTASSIUM SERPL-SCNC: 4.4 MMOL/L (ref 3.5–5.1)
PROT SERPL-MCNC: 8.1 G/DL (ref 6–8.4)
RBC # BLD AUTO: 4.19 M/UL (ref 4.6–6.2)
SAMPLE: ABNORMAL
SITE: ABNORMAL
SODIUM SERPL-SCNC: 131 MMOL/L (ref 136–145)
SODIUM SERPL-SCNC: 132 MMOL/L (ref 136–145)
WBC # BLD AUTO: 10.26 K/UL (ref 3.9–12.7)

## 2022-06-18 PROCEDURE — 25000003 PHARM REV CODE 250: Mod: NTX | Performed by: INTERNAL MEDICINE

## 2022-06-18 PROCEDURE — 99292 PR CRITICAL CARE, ADDL 30 MIN: ICD-10-PCS | Mod: NTX,,, | Performed by: INTERNAL MEDICINE

## 2022-06-18 PROCEDURE — 63600367 HC NITRIC OXIDE PER HOUR: Mod: NTX

## 2022-06-18 PROCEDURE — 83605 ASSAY OF LACTIC ACID: CPT | Mod: NTX | Performed by: PHYSICIAN ASSISTANT

## 2022-06-18 PROCEDURE — 20000000 HC ICU ROOM: Mod: NTX

## 2022-06-18 PROCEDURE — 27000221 HC OXYGEN, UP TO 24 HOURS: Mod: NTX

## 2022-06-18 PROCEDURE — 80048 BASIC METABOLIC PNL TOTAL CA: CPT | Mod: NTX,XB | Performed by: INTERNAL MEDICINE

## 2022-06-18 PROCEDURE — 85730 THROMBOPLASTIN TIME PARTIAL: CPT | Mod: 91,NTX | Performed by: INTERNAL MEDICINE

## 2022-06-18 PROCEDURE — 99291 CRITICAL CARE FIRST HOUR: CPT | Mod: NTX,,, | Performed by: NURSE PRACTITIONER

## 2022-06-18 PROCEDURE — 85025 COMPLETE CBC W/AUTO DIFF WBC: CPT | Mod: NTX | Performed by: INTERNAL MEDICINE

## 2022-06-18 PROCEDURE — 27000202 HC IABP, ADD'L DAY: Mod: NTX

## 2022-06-18 PROCEDURE — 83050 HGB METHEMOGLOBIN QUAN: CPT | Mod: NTX

## 2022-06-18 PROCEDURE — 80053 COMPREHEN METABOLIC PANEL: CPT | Mod: NTX | Performed by: NURSE PRACTITIONER

## 2022-06-18 PROCEDURE — 82803 BLOOD GASES ANY COMBINATION: CPT | Mod: NTX

## 2022-06-18 PROCEDURE — 63600175 PHARM REV CODE 636 W HCPCS: Mod: NTX | Performed by: NURSE PRACTITIONER

## 2022-06-18 PROCEDURE — 99292 CRITICAL CARE ADDL 30 MIN: CPT | Mod: NTX,,, | Performed by: INTERNAL MEDICINE

## 2022-06-18 PROCEDURE — 99291 PR CRITICAL CARE, E/M 30-74 MINUTES: ICD-10-PCS | Mod: NTX,,, | Performed by: NURSE PRACTITIONER

## 2022-06-18 PROCEDURE — 85730 THROMBOPLASTIN TIME PARTIAL: CPT | Mod: NTX | Performed by: INTERNAL MEDICINE

## 2022-06-18 PROCEDURE — 25000003 PHARM REV CODE 250: Mod: NTX | Performed by: NURSE PRACTITIONER

## 2022-06-18 PROCEDURE — 99900035 HC TECH TIME PER 15 MIN (STAT): Mod: NTX

## 2022-06-18 PROCEDURE — 83735 ASSAY OF MAGNESIUM: CPT | Mod: NTX | Performed by: INTERNAL MEDICINE

## 2022-06-18 PROCEDURE — 94761 N-INVAS EAR/PLS OXIMETRY MLT: CPT | Mod: NTX

## 2022-06-18 PROCEDURE — 63600175 PHARM REV CODE 636 W HCPCS: Mod: NTX | Performed by: INTERNAL MEDICINE

## 2022-06-18 RX ORDER — POTASSIUM CHLORIDE 750 MG/1
30 CAPSULE, EXTENDED RELEASE ORAL 2 TIMES DAILY
Status: DISCONTINUED | OUTPATIENT
Start: 2022-06-18 | End: 2022-06-29

## 2022-06-18 RX ORDER — SODIUM CHLORIDE 9 MG/ML
INJECTION, SOLUTION INTRAVENOUS CONTINUOUS
Status: DISCONTINUED | OUTPATIENT
Start: 2022-06-18 | End: 2022-06-18

## 2022-06-18 RX ORDER — POTASSIUM CHLORIDE 20 MEQ/1
20 TABLET, EXTENDED RELEASE ORAL ONCE
Status: COMPLETED | OUTPATIENT
Start: 2022-06-18 | End: 2022-06-18

## 2022-06-18 RX ORDER — LANOLIN ALCOHOL/MO/W.PET/CERES
400 CREAM (GRAM) TOPICAL DAILY
Status: DISCONTINUED | OUTPATIENT
Start: 2022-06-18 | End: 2022-06-18

## 2022-06-18 RX ORDER — SODIUM CHLORIDE 9 MG/ML
INJECTION, SOLUTION INTRAVENOUS CONTINUOUS
Status: ACTIVE | OUTPATIENT
Start: 2022-06-18 | End: 2022-06-18

## 2022-06-18 RX ADMIN — MIRTAZAPINE 15 MG: 15 TABLET, FILM COATED ORAL at 08:06

## 2022-06-18 RX ADMIN — INSULIN ASPART 1 UNITS: 100 INJECTION, SOLUTION INTRAVENOUS; SUBCUTANEOUS at 09:06

## 2022-06-18 RX ADMIN — SODIUM CHLORIDE: 9 INJECTION, SOLUTION INTRAVENOUS at 06:06

## 2022-06-18 RX ADMIN — POTASSIUM CHLORIDE 20 MEQ: 1500 TABLET, EXTENDED RELEASE ORAL at 05:06

## 2022-06-18 RX ADMIN — BUSPIRONE HYDROCHLORIDE 7.5 MG: 5 TABLET ORAL at 08:06

## 2022-06-18 RX ADMIN — MILRINONE LACTATE IN DEXTROSE 0.38 MCG/KG/MIN: 200 INJECTION, SOLUTION INTRAVENOUS at 12:06

## 2022-06-18 RX ADMIN — SUCRALFATE 1 G: 1 TABLET ORAL at 08:06

## 2022-06-18 RX ADMIN — METHOCARBAMOL TABLETS 500 MG: 500 TABLET, COATED ORAL at 02:06

## 2022-06-18 RX ADMIN — MILRINONE LACTATE IN DEXTROSE 0.38 MCG/KG/MIN: 200 INJECTION, SOLUTION INTRAVENOUS at 08:06

## 2022-06-18 RX ADMIN — HYDROCODONE BITARTRATE AND ACETAMINOPHEN 2 TABLET: 5; 325 TABLET ORAL at 05:06

## 2022-06-18 RX ADMIN — ACETAMINOPHEN 650 MG: 325 TABLET ORAL at 02:06

## 2022-06-18 RX ADMIN — MUPIROCIN: 20 OINTMENT TOPICAL at 08:06

## 2022-06-18 RX ADMIN — HEPARIN SODIUM 21 UNITS/KG/HR: 5000 INJECTION INTRAVENOUS; SUBCUTANEOUS at 12:06

## 2022-06-18 RX ADMIN — INSULIN ASPART 2 UNITS: 100 INJECTION, SOLUTION INTRAVENOUS; SUBCUTANEOUS at 04:06

## 2022-06-18 RX ADMIN — POTASSIUM CHLORIDE 30 MEQ: 10 CAPSULE, COATED, EXTENDED RELEASE ORAL at 08:06

## 2022-06-18 RX ADMIN — ASPIRIN 81 MG CHEWABLE TABLET 81 MG: 81 TABLET CHEWABLE at 08:06

## 2022-06-18 RX ADMIN — HYDROCODONE BITARTRATE AND ACETAMINOPHEN 2 TABLET: 5; 325 TABLET ORAL at 10:06

## 2022-06-18 RX ADMIN — METHOCARBAMOL TABLETS 500 MG: 500 TABLET, COATED ORAL at 08:06

## 2022-06-18 RX ADMIN — HYDROCODONE BITARTRATE AND ACETAMINOPHEN 2 TABLET: 5; 325 TABLET ORAL at 02:06

## 2022-06-18 RX ADMIN — MILRINONE LACTATE IN DEXTROSE 0.38 MCG/KG/MIN: 200 INJECTION, SOLUTION INTRAVENOUS at 06:06

## 2022-06-18 RX ADMIN — PANTOPRAZOLE SODIUM 40 MG: 40 TABLET, DELAYED RELEASE ORAL at 08:06

## 2022-06-18 RX ADMIN — METHOCARBAMOL TABLETS 500 MG: 500 TABLET, COATED ORAL at 05:06

## 2022-06-18 RX ADMIN — HEPARIN SODIUM 23 UNITS/KG/HR: 5000 INJECTION INTRAVENOUS; SUBCUTANEOUS at 05:06

## 2022-06-18 NOTE — CARE UPDATE
Transplant Update Note    Hemodynamics at 8 pm    CVP 6  SvO2 79  CO 10.12  CI 4.60    Cr 2.0 --> 2.1 --> 2.2    Lasix gtt was discontinued during day shift, keep off Lasix for now.     Please call with questions or concerns. Discussed with staff.    Rebel Sanderson MD  Heart Transplant PGY4  [facility]

## 2022-06-18 NOTE — SUBJECTIVE & OBJECTIVE
**Interval History: Remains net -ve 400 cc past 24 hours despite stopping Lasix gtt yesterday 2/2 rising sCr. sCr still 2.0 today, CVP 7 with goal of 8. Giving 2500 cc IV NS over 2 hours and will re check labs at 3 PM. Has tentative VAD date of 6/23    Continuous Infusions:   sodium chloride 0.9% Stopped (06/18/22 0017)    EPINEPHrine 0.02 mcg/kg/min (06/18/22 0600)    heparin (porcine) in D5W 22 Units/kg/hr (06/18/22 0600)    milrinone 20mg/100ml D5W (200mcg/ml) 0.375 mcg/kg/min (06/18/22 0600)    nitric oxide gas       Scheduled Meds:   acetaminophen  650 mg Oral Q8H    aspirin  81 mg Oral Daily    busPIRone  7.5 mg Oral Q12H    HYDROcodone-acetaminophen  2 tablet Oral Q8H    methocarbamoL  500 mg Oral QID    mirtazapine  15 mg Oral Nightly    mupirocin   Nasal BID    pantoprazole  40 mg Oral Daily    polyethylene glycol  17 g Oral BID    potassium chloride  30 mEq Oral BID    senna-docusate 8.6-50 mg  1 tablet Oral Daily    sucralfate  1 g Oral Nightly     PRN Meds:bisacodyL, dextrose 10%, dextrose 10%, glucagon (human recombinant), glucose, glucose, insulin aspart U-100, magnesium oxide, magnesium oxide, potassium bicarbonate, potassium bicarbonate, potassium bicarbonate, potassium, sodium phosphates, potassium, sodium phosphates, potassium, sodium phosphates, promethazine, sodium chloride 0.9%    Review of patient's allergies indicates:   Allergen Reactions    Bumex [bumetanide] Hives    Lactose Diarrhea     Other reaction(s): Abdominal distension, gaseous    Torsemide Hives     Objective:     Vital Signs (Most Recent):  Temp: 98.1 °F (36.7 °C) (06/18/22 0400)  Pulse: 102 (06/18/22 0712)  Resp: 10 (06/18/22 0712)  BP: 113/67 (06/18/22 0600)  SpO2: (!) 92 % (06/18/22 0712)   Vital Signs (24h Range):  Temp:  [98 °F (36.7 °C)-98.4 °F (36.9 °C)] 98.1 °F (36.7 °C)  Pulse:  [] 102  Resp:  [10-26] 10  SpO2:  [92 %-100 %] 92 %  BP: (108-140)/(59-83) 113/67     Patient Vitals for the past 72 hrs (Last 3  readings):   Weight   06/18/22 0600 94.2 kg (207 lb 10.8 oz)     Body mass index is 25.96 kg/m².      Intake/Output Summary (Last 24 hours) at 6/18/2022 0831  Last data filed at 6/18/2022 0600  Gross per 24 hour   Intake 1504.16 ml   Output 1990 ml   Net -485.84 ml       Hemodynamic Parameters:       Telemetry: ST    Physical Exam  Constitutional:       Appearance: Normal appearance.   HENT:      Head: Normocephalic and atraumatic.   Eyes:      Conjunctiva/sclera: Conjunctivae normal.      Pupils: Pupils are equal, round, and reactive to light.   Neck:      Comments: RIJ TLC  Cardiovascular:      Rate and Rhythm: Regular rhythm. Tachycardia present.      Comments: +S3  Pulmonary:      Effort: Pulmonary effort is normal.      Breath sounds: Normal breath sounds.   Abdominal:      General: Bowel sounds are normal. There is distension.   Musculoskeletal:         General: Normal range of motion.      Cervical back: Neck supple.      Comments: Trace TREVER   Skin:     General: Skin is warm and dry.      Capillary Refill: Capillary refill takes 2 to 3 seconds.   Neurological:      General: No focal deficit present.      Mental Status: He is alert and oriented to person, place, and time.   Psychiatric:         Mood and Affect: Mood normal.         Behavior: Behavior normal.         Thought Content: Thought content normal.         Judgment: Judgment normal.       Significant Labs:  CBC:  Recent Labs   Lab 06/16/22  0326 06/17/22  0332 06/18/22  0325   WBC 9.61 8.06 10.26   RBC 3.70* 4.24* 4.19*   HGB 11.2* 12.6* 12.5*   HCT 33.4* 36.8* 37.0*    338 363   MCV 90 87 88   MCH 30.3 29.7 29.8   MCHC 33.5 34.2 33.8     BNP:  Recent Labs   Lab 06/13/22  0901 06/17/22  0332   BNP 1,701* 318*     CMP:  Recent Labs   Lab 06/16/22  0326 06/16/22  1511 06/17/22  0332 06/17/22  1233 06/17/22  1632 06/18/22  0325   *   < > 152* 150* 196* 265*   CALCIUM 10.3   < > 10.3 11.1* 11.2* 10.3   ALBUMIN 3.6  --  4.0  --   --  3.5    PROT 7.9  --  8.2  --   --  8.1   *   < > 136 131* 130* 132*   K 4.1   < > 3.7 4.0 4.5 3.8   CO2 31*   < > 36* 33* 31* 36*   CL 91*   < > 83* 80* 82* 85*   BUN 20   < > 32* 35* 38* 40*   CREATININE 1.9*   < > 2.0* 2.1* 2.2* 2.0*   ALKPHOS 94  --  111  --   --  114   ALT 23  --  22  --   --  17   AST 25  --  29  --   --  27   BILITOT 1.1*  --  1.3*  --   --  0.9    < > = values in this interval not displayed.      Coagulation:   Recent Labs   Lab 06/13/22  2150 06/14/22  0549 06/16/22  0609 06/17/22  0332 06/18/22  0325   INR 1.2  --   --   --   --    APTT 26.8   < > 44.0* 43.8* 37.2*    < > = values in this interval not displayed.     LDH:  No results for input(s): LDH in the last 72 hours.  Microbiology:  Microbiology Results (last 7 days)       ** No results found for the last 168 hours. **            I have reviewed all pertinent labs within the past 24 hours.    Estimated Creatinine Clearance: 60.4 mL/min (A) (based on SCr of 2 mg/dL (H)).    Diagnostic Results:  I have reviewed and interpreted all pertinent imaging results/findings within the past 24 hours.

## 2022-06-18 NOTE — PROGRESS NOTES
[facility] - Cardiac Intensive Care  Heart Transplant  Progress Note    Patient Name: Kevan Queen  MRN: [id number]  Admission Date: 6/13/2022  Hospital Length of Stay: 5 days  Attending Physician: Josh Pulido MD  Primary Care Provider: ORALIA Cline  Principal Problem:Acute on chronic combined systolic and diastolic heart failure, NYHA class 4    Subjective:     **Interval History: Remains net -ve 400 cc past 24 hours despite stopping Lasix gtt yesterday 2/2 rising sCr. sCr still 2.0 today, CVP 7 with goal of 8. Giving 2500 cc IV NS over 2 hours and will re check labs at 3 PM. Has tentative VAD date of 6/23    Continuous Infusions:   sodium chloride 0.9% Stopped (06/18/22 0017)    EPINEPHrine 0.02 mcg/kg/min (06/18/22 0600)    heparin (porcine) in D5W 22 Units/kg/hr (06/18/22 0600)    milrinone 20mg/100ml D5W (200mcg/ml) 0.375 mcg/kg/min (06/18/22 0600)    nitric oxide gas       Scheduled Meds:   acetaminophen  650 mg Oral Q8H    aspirin  81 mg Oral Daily    busPIRone  7.5 mg Oral Q12H    HYDROcodone-acetaminophen  2 tablet Oral Q8H    methocarbamoL  500 mg Oral QID    mirtazapine  15 mg Oral Nightly    mupirocin   Nasal BID    pantoprazole  40 mg Oral Daily    polyethylene glycol  17 g Oral BID    potassium chloride  30 mEq Oral BID    senna-docusate 8.6-50 mg  1 tablet Oral Daily    sucralfate  1 g Oral Nightly     PRN Meds:bisacodyL, dextrose 10%, dextrose 10%, glucagon (human recombinant), glucose, glucose, insulin aspart U-100, magnesium oxide, magnesium oxide, potassium bicarbonate, potassium bicarbonate, potassium bicarbonate, potassium, sodium phosphates, potassium, sodium phosphates, potassium, sodium phosphates, promethazine, sodium chloride 0.9%    Review of patient's allergies indicates:   Allergen Reactions    Bumex [bumetanide] Hives    Lactose Diarrhea     Other reaction(s): Abdominal distension, gaseous    Torsemide Hives     Objective:     Vital Signs (Most  Recent):  Temp: 98.1 °F (36.7 °C) (06/18/22 0400)  Pulse: 102 (06/18/22 0712)  Resp: 10 (06/18/22 0712)  BP: 113/67 (06/18/22 0600)  SpO2: (!) 92 % (06/18/22 0712)   Vital Signs (24h Range):  Temp:  [98 °F (36.7 °C)-98.4 °F (36.9 °C)] 98.1 °F (36.7 °C)  Pulse:  [] 102  Resp:  [10-26] 10  SpO2:  [92 %-100 %] 92 %  BP: (108-140)/(59-83) 113/67     Patient Vitals for the past 72 hrs (Last 3 readings):   Weight   06/18/22 0600 94.2 kg (207 lb 10.8 oz)     Body mass index is 25.96 kg/m².      Intake/Output Summary (Last 24 hours) at 6/18/2022 0831  Last data filed at 6/18/2022 0600  Gross per 24 hour   Intake 1504.16 ml   Output 1990 ml   Net -485.84 ml       Hemodynamic Parameters:       Telemetry: ST    Physical Exam  Constitutional:       Appearance: Normal appearance.   HENT:      Head: Normocephalic and atraumatic.   Eyes:      Conjunctiva/sclera: Conjunctivae normal.      Pupils: Pupils are equal, round, and reactive to light.   Neck:      Comments: RIJ TLC  Cardiovascular:      Rate and Rhythm: Regular rhythm. Tachycardia present.      Comments: +S3  Pulmonary:      Effort: Pulmonary effort is normal.      Breath sounds: Normal breath sounds.   Abdominal:      General: Bowel sounds are normal. There is distension.   Musculoskeletal:         General: Normal range of motion.      Cervical back: Neck supple.      Comments: Trace TREVER   Skin:     General: Skin is warm and dry.      Capillary Refill: Capillary refill takes 2 to 3 seconds.   Neurological:      General: No focal deficit present.      Mental Status: He is alert and oriented to person, place, and time.   Psychiatric:         Mood and Affect: Mood normal.         Behavior: Behavior normal.         Thought Content: Thought content normal.         Judgment: Judgment normal.       Significant Labs:  CBC:  Recent Labs   Lab 06/16/22  0326 06/17/22  0332 06/18/22  0325   WBC 9.61 8.06 10.26   RBC 3.70* 4.24* 4.19*   HGB 11.2* 12.6* 12.5*   HCT 33.4* 36.8*  37.0*    338 363   MCV 90 87 88   MCH 30.3 29.7 29.8   MCHC 33.5 34.2 33.8     BNP:  Recent Labs   Lab 06/13/22  0901 06/17/22  0332   BNP 1,701* 318*     CMP:  Recent Labs   Lab 06/16/22  0326 06/16/22  1511 06/17/22  0332 06/17/22  1233 06/17/22  1632 06/18/22  0325   *   < > 152* 150* 196* 265*   CALCIUM 10.3   < > 10.3 11.1* 11.2* 10.3   ALBUMIN 3.6  --  4.0  --   --  3.5   PROT 7.9  --  8.2  --   --  8.1   *   < > 136 131* 130* 132*   K 4.1   < > 3.7 4.0 4.5 3.8   CO2 31*   < > 36* 33* 31* 36*   CL 91*   < > 83* 80* 82* 85*   BUN 20   < > 32* 35* 38* 40*   CREATININE 1.9*   < > 2.0* 2.1* 2.2* 2.0*   ALKPHOS 94  --  111  --   --  114   ALT 23  --  22  --   --  17   AST 25  --  29  --   --  27   BILITOT 1.1*  --  1.3*  --   --  0.9    < > = values in this interval not displayed.      Coagulation:   Recent Labs   Lab 06/13/22  2150 06/14/22  0549 06/16/22  0609 06/17/22  0332 06/18/22  0325   INR 1.2  --   --   --   --    APTT 26.8   < > 44.0* 43.8* 37.2*    < > = values in this interval not displayed.     LDH:  No results for input(s): LDH in the last 72 hours.  Microbiology:  Microbiology Results (last 7 days)       ** No results found for the last 168 hours. **            I have reviewed all pertinent labs within the past 24 hours.    Estimated Creatinine Clearance: 60.4 mL/min (A) (based on SCr of 2 mg/dL (H)).    Diagnostic Results:  I have reviewed and interpreted all pertinent imaging results/findings within the past 24 hours.    Assessment and Plan:     36 yo male with NIDCM with BiV systolic heart failure, on home Milrinone at 0.375 mcg/kg/min, presented at [facility] 4/2/22 ,was not evaluated for OHT as he has recently quit smoking in April 2022 but was approved for VAD with plan to begin OHT evaluation in upcoming months if Mr Queen is stable and suitable for OHT eval (blood group A), issues with frozen shoulder following ICD implant in the past, had clinic appointment last week to  "f/u recent admit for hyperglycemic hyperosmolar syndrome but did not come as he was "feeling too bad" presents to our ED with SOB at rest for 1 week, 6# weight gain (reports dry weight is 217#), inability to sleep past 3 nights 2/2 SOB (says he sleep on his side). Went to ED at [facility] 6/10 but left after waiting 4 hours. Had clinic appointment with us today, but arrived to [facility] early this morning and decided to go to the ED instead. Baseline Lasix dose is 80 mg bid. Reports taking 240 mg qd past 3 days with no improvement. BNP is 1701, up from 898 on 6/2 and 49 on 5/24. sCr is 1.8 with baseline ~ 1.5-1.7. sPO2 on RA is 93%. Wife at bedside    He has been given Lasix 80 mg IVP in the ED with plan to start Lasix gtt at 20 mg/hr           * Acute on chronic combined systolic and diastolic heart failure, NYHA class 4  -[facility]  -Approved for LVAD at [facility] on 4/2/22. Was not evaluated for OHTx as he quit smoking in April 2022  -Moved to ICU 6/13 in the setting of significant volume overloaded and low output state. IABP placed 6/13, Lasix increased to 40 mg/hr and Epi and Marcella added to Primacor. sVO2 has improved at 51 with CO/CI 5.29/2.31 and   -on Lasix drip at 40mg/hr with prn Metolazone.  Patient is net negative 4.1L in the last 24 hours  -CVP: 5, SVO2: 70, CO:7.54, CI:3.24, SVR: 880.  Will hold Lasix drip this monring and monitor response.   -IABP 1:1, IV milrinone 0.375, epi 0.02, and Marcella 10 ppm.   -TTE 6/14: LVEF 10%, G3DD, mild-moderate reduced RVSF, moderate-severe MR, moderate-sever tr, CVP 15, PASP 56, moderate PH, LVEDD 7.59  -RHC done 4/19/22 on Milrinone 0.5 mcg/kg/min: RA 11, PA 50/27 (35), W 27, CO/CI 3.7/1.7, PVR 2.2 and SVR 1535   -GDMT: Entresto and Aldactone on hold since admit 5/24-5/27 2/2 elevated sCr  -Reports dry weight is 217#, and that he had gained 6# on his home scale  -May need LVAD this admit. Unlikely OHTx candidate with smoking history (quit April 2022)           Muscle " cramping  -Continue Percocet with muscle relaxant (EKG checked for QTC prolongation)   -Consider TCA if not improved    Uncontrolled diabetes mellitus  -Admitted 5/24-5/27 with hyperglycemia hyperosmolar syndrome  -Hgb A1C 9.2 on 5/20/22  -Glucose per labs today 169  -SSI for now    CKD (chronic kidney disease) stage 3, GFR 30-59 ml/min  -Admit sCr 1.8, baseline ~ 1.5-1.7  -Improved   -Monitor with diuresis    Cardiogenic shock  -See A/C CHF      Uninterrupted Critical Care/Counseling Time (not including procedures): [place] [id number]  Heart Transplant  [facility] - Cardiac Intensive Care

## 2022-06-19 LAB
ALBUMIN SERPL BCP-MCNC: 3.3 G/DL (ref 3.5–5.2)
ALLENS TEST: ABNORMAL
ALLENS TEST: ABNORMAL
ALP SERPL-CCNC: 101 U/L (ref 55–135)
ALT SERPL W/O P-5'-P-CCNC: 17 U/L (ref 10–44)
ANION GAP SERPL CALC-SCNC: 11 MMOL/L (ref 8–16)
ANION GAP SERPL CALC-SCNC: 12 MMOL/L (ref 8–16)
APTT BLDCRRT: 37.6 SEC (ref 21–32)
APTT BLDCRRT: 37.9 SEC (ref 21–32)
APTT BLDCRRT: 50.1 SEC (ref 21–32)
AST SERPL-CCNC: 23 U/L (ref 10–40)
BASOPHILS # BLD AUTO: 0.03 K/UL (ref 0–0.2)
BASOPHILS NFR BLD: 0.3 % (ref 0–1.9)
BILIRUB SERPL-MCNC: 0.6 MG/DL (ref 0.1–1)
BUN SERPL-MCNC: 26 MG/DL (ref 6–20)
BUN SERPL-MCNC: 34 MG/DL (ref 6–20)
CALCIUM SERPL-MCNC: 10.1 MG/DL (ref 8.7–10.5)
CALCIUM SERPL-MCNC: 9.8 MG/DL (ref 8.7–10.5)
CHLORIDE SERPL-SCNC: 92 MMOL/L (ref 95–110)
CHLORIDE SERPL-SCNC: 95 MMOL/L (ref 95–110)
CO2 SERPL-SCNC: 27 MMOL/L (ref 23–29)
CO2 SERPL-SCNC: 29 MMOL/L (ref 23–29)
CREAT SERPL-MCNC: 1.4 MG/DL (ref 0.5–1.4)
CREAT SERPL-MCNC: 1.5 MG/DL (ref 0.5–1.4)
DELSYS: ABNORMAL
DIFFERENTIAL METHOD: ABNORMAL
EOSINOPHIL # BLD AUTO: 0.1 K/UL (ref 0–0.5)
EOSINOPHIL NFR BLD: 1.3 % (ref 0–8)
ERYTHROCYTE [DISTWIDTH] IN BLOOD BY AUTOMATED COUNT: 14.3 % (ref 11.5–14.5)
EST. GFR  (AFRICAN AMERICAN): >60 ML/MIN/1.73 M^2
EST. GFR  (AFRICAN AMERICAN): >60 ML/MIN/1.73 M^2
EST. GFR  (NON AFRICAN AMERICAN): 58.6 ML/MIN/1.73 M^2
EST. GFR  (NON AFRICAN AMERICAN): >60 ML/MIN/1.73 M^2
GLUCOSE SERPL-MCNC: 222 MG/DL (ref 70–110)
GLUCOSE SERPL-MCNC: 248 MG/DL (ref 70–110)
HCO3 UR-SCNC: 32.6 MMOL/L (ref 24–28)
HCO3 UR-SCNC: 34.3 MMOL/L (ref 24–28)
HCT VFR BLD AUTO: 33.7 % (ref 40–54)
HGB BLD-MCNC: 11.2 G/DL (ref 14–18)
IMM GRANULOCYTES # BLD AUTO: 0.01 K/UL (ref 0–0.04)
IMM GRANULOCYTES NFR BLD AUTO: 0.1 % (ref 0–0.5)
LACTATE SERPL-SCNC: 1 MMOL/L (ref 0.5–2.2)
LYMPHOCYTES # BLD AUTO: 1.4 K/UL (ref 1–4.8)
LYMPHOCYTES NFR BLD: 15.7 % (ref 18–48)
MAGNESIUM SERPL-MCNC: 2.4 MG/DL (ref 1.6–2.6)
MCH RBC QN AUTO: 29.9 PG (ref 27–31)
MCHC RBC AUTO-ENTMCNC: 33.2 G/DL (ref 32–36)
MCV RBC AUTO: 90 FL (ref 82–98)
METHEMOGLOBIN: 1.1 % (ref 0–3)
MONOCYTES # BLD AUTO: 0.7 K/UL (ref 0.3–1)
MONOCYTES NFR BLD: 7.8 % (ref 4–15)
NEUTROPHILS # BLD AUTO: 6.9 K/UL (ref 1.8–7.7)
NEUTROPHILS NFR BLD: 74.8 % (ref 38–73)
NRBC BLD-RTO: 0 /100 WBC
PCO2 BLDA: 59.7 MMHG (ref 35–45)
PCO2 BLDA: 62.1 MMHG (ref 35–45)
PH SMN: 7.34 [PH] (ref 7.35–7.45)
PH SMN: 7.35 [PH] (ref 7.35–7.45)
PLATELET # BLD AUTO: 328 K/UL (ref 150–450)
PMV BLD AUTO: 9.6 FL (ref 9.2–12.9)
PO2 BLDA: 26 MMHG (ref 40–60)
PO2 BLDA: 30 MMHG (ref 40–60)
POC BE: 7 MMOL/L
POC BE: 9 MMOL/L
POC SATURATED O2: 42 % (ref 95–100)
POC SATURATED O2: 52 % (ref 95–100)
POC TCO2: 34 MMOL/L (ref 24–29)
POC TCO2: 36 MMOL/L (ref 24–29)
POCT GLUCOSE: 251 MG/DL (ref 70–110)
POCT GLUCOSE: 259 MG/DL (ref 70–110)
POCT GLUCOSE: 285 MG/DL (ref 70–110)
POTASSIUM SERPL-SCNC: 4 MMOL/L (ref 3.5–5.1)
POTASSIUM SERPL-SCNC: 4.1 MMOL/L (ref 3.5–5.1)
PROT SERPL-MCNC: 7.4 G/DL (ref 6–8.4)
RBC # BLD AUTO: 3.75 M/UL (ref 4.6–6.2)
SAMPLE: ABNORMAL
SAMPLE: ABNORMAL
SITE: ABNORMAL
SITE: ABNORMAL
SODIUM SERPL-SCNC: 132 MMOL/L (ref 136–145)
SODIUM SERPL-SCNC: 134 MMOL/L (ref 136–145)
WBC # BLD AUTO: 9.16 K/UL (ref 3.9–12.7)

## 2022-06-19 PROCEDURE — 85730 THROMBOPLASTIN TIME PARTIAL: CPT | Mod: 91,NTX | Performed by: INTERNAL MEDICINE

## 2022-06-19 PROCEDURE — 99222 1ST HOSP IP/OBS MODERATE 55: CPT | Mod: NTX,,, | Performed by: NURSE PRACTITIONER

## 2022-06-19 PROCEDURE — 63600175 PHARM REV CODE 636 W HCPCS: Mod: NTX | Performed by: NURSE PRACTITIONER

## 2022-06-19 PROCEDURE — 80048 BASIC METABOLIC PNL TOTAL CA: CPT | Mod: NTX,XB | Performed by: INTERNAL MEDICINE

## 2022-06-19 PROCEDURE — 25000003 PHARM REV CODE 250: Mod: NTX | Performed by: INTERNAL MEDICINE

## 2022-06-19 PROCEDURE — 83735 ASSAY OF MAGNESIUM: CPT | Mod: NTX | Performed by: INTERNAL MEDICINE

## 2022-06-19 PROCEDURE — 99291 PR CRITICAL CARE, E/M 30-74 MINUTES: ICD-10-PCS | Mod: NTX,,, | Performed by: NURSE PRACTITIONER

## 2022-06-19 PROCEDURE — 27000221 HC OXYGEN, UP TO 24 HOURS: Mod: NTX

## 2022-06-19 PROCEDURE — 20000000 HC ICU ROOM: Mod: NTX

## 2022-06-19 PROCEDURE — C9399 UNCLASSIFIED DRUGS OR BIOLOG: HCPCS | Mod: NTX | Performed by: NURSE PRACTITIONER

## 2022-06-19 PROCEDURE — 25000003 PHARM REV CODE 250: Mod: NTX | Performed by: NURSE PRACTITIONER

## 2022-06-19 PROCEDURE — 80053 COMPREHEN METABOLIC PANEL: CPT | Mod: NTX | Performed by: NURSE PRACTITIONER

## 2022-06-19 PROCEDURE — 99222 PR INITIAL HOSPITAL CARE,LEVL II: ICD-10-PCS | Mod: NTX,,, | Performed by: NURSE PRACTITIONER

## 2022-06-19 PROCEDURE — 63600367 HC NITRIC OXIDE PER HOUR: Mod: NTX

## 2022-06-19 PROCEDURE — 25000003 PHARM REV CODE 250: Mod: NTX | Performed by: PHYSICIAN ASSISTANT

## 2022-06-19 PROCEDURE — 99292 CRITICAL CARE ADDL 30 MIN: CPT | Mod: NTX,,, | Performed by: INTERNAL MEDICINE

## 2022-06-19 PROCEDURE — 85025 COMPLETE CBC W/AUTO DIFF WBC: CPT | Mod: NTX | Performed by: INTERNAL MEDICINE

## 2022-06-19 PROCEDURE — 83050 HGB METHEMOGLOBIN QUAN: CPT | Mod: NTX

## 2022-06-19 PROCEDURE — 94761 N-INVAS EAR/PLS OXIMETRY MLT: CPT | Mod: NTX

## 2022-06-19 PROCEDURE — 63600175 PHARM REV CODE 636 W HCPCS: Mod: NTX | Performed by: INTERNAL MEDICINE

## 2022-06-19 PROCEDURE — 27000202 HC IABP, ADD'L DAY: Mod: NTX

## 2022-06-19 PROCEDURE — 83605 ASSAY OF LACTIC ACID: CPT | Mod: NTX | Performed by: PHYSICIAN ASSISTANT

## 2022-06-19 PROCEDURE — 99900035 HC TECH TIME PER 15 MIN (STAT): Mod: NTX

## 2022-06-19 PROCEDURE — 99291 CRITICAL CARE FIRST HOUR: CPT | Mod: NTX,,, | Performed by: NURSE PRACTITIONER

## 2022-06-19 PROCEDURE — 85730 THROMBOPLASTIN TIME PARTIAL: CPT | Mod: NTX | Performed by: INTERNAL MEDICINE

## 2022-06-19 PROCEDURE — 99292 PR CRITICAL CARE, ADDL 30 MIN: ICD-10-PCS | Mod: NTX,,, | Performed by: INTERNAL MEDICINE

## 2022-06-19 PROCEDURE — 82803 BLOOD GASES ANY COMBINATION: CPT | Mod: NTX

## 2022-06-19 RX ORDER — GABAPENTIN 300 MG/1
300 CAPSULE ORAL 3 TIMES DAILY
Status: DISCONTINUED | OUTPATIENT
Start: 2022-06-19 | End: 2022-06-29

## 2022-06-19 RX ORDER — INSULIN ASPART 100 [IU]/ML
8 INJECTION, SOLUTION INTRAVENOUS; SUBCUTANEOUS
Status: DISCONTINUED | OUTPATIENT
Start: 2022-06-19 | End: 2022-06-20

## 2022-06-19 RX ORDER — HYDROCODONE BITARTRATE AND ACETAMINOPHEN 5; 325 MG/1; MG/1
1 TABLET ORAL EVERY 8 HOURS
Status: DISCONTINUED | OUTPATIENT
Start: 2022-06-19 | End: 2022-06-29

## 2022-06-19 RX ADMIN — INSULIN ASPART 3 UNITS: 100 INJECTION, SOLUTION INTRAVENOUS; SUBCUTANEOUS at 11:06

## 2022-06-19 RX ADMIN — INSULIN ASPART 8 UNITS: 100 INJECTION, SOLUTION INTRAVENOUS; SUBCUTANEOUS at 11:06

## 2022-06-19 RX ADMIN — PANTOPRAZOLE SODIUM 40 MG: 40 TABLET, DELAYED RELEASE ORAL at 09:06

## 2022-06-19 RX ADMIN — INSULIN ASPART 8 UNITS: 100 INJECTION, SOLUTION INTRAVENOUS; SUBCUTANEOUS at 05:06

## 2022-06-19 RX ADMIN — ACETAMINOPHEN 650 MG: 325 TABLET ORAL at 01:06

## 2022-06-19 RX ADMIN — ASPIRIN 81 MG CHEWABLE TABLET 81 MG: 81 TABLET CHEWABLE at 09:06

## 2022-06-19 RX ADMIN — BUSPIRONE HYDROCHLORIDE 7.5 MG: 5 TABLET ORAL at 09:06

## 2022-06-19 RX ADMIN — MIRTAZAPINE 15 MG: 15 TABLET, FILM COATED ORAL at 09:06

## 2022-06-19 RX ADMIN — SUCRALFATE 1 G: 1 TABLET ORAL at 09:06

## 2022-06-19 RX ADMIN — METHOCARBAMOL TABLETS 500 MG: 500 TABLET, COATED ORAL at 09:06

## 2022-06-19 RX ADMIN — EPINEPHRINE 0.02 MCG/KG/MIN: 1 INJECTION INTRAMUSCULAR; INTRAVENOUS; SUBCUTANEOUS at 02:06

## 2022-06-19 RX ADMIN — GABAPENTIN 300 MG: 300 CAPSULE ORAL at 09:06

## 2022-06-19 RX ADMIN — HEPARIN SODIUM 25 UNITS/KG/HR: 5000 INJECTION INTRAVENOUS; SUBCUTANEOUS at 08:06

## 2022-06-19 RX ADMIN — POTASSIUM CHLORIDE 30 MEQ: 10 CAPSULE, COATED, EXTENDED RELEASE ORAL at 09:06

## 2022-06-19 RX ADMIN — MILRINONE LACTATE IN DEXTROSE 0.38 MCG/KG/MIN: 200 INJECTION, SOLUTION INTRAVENOUS at 05:06

## 2022-06-19 RX ADMIN — GABAPENTIN 300 MG: 300 CAPSULE ORAL at 02:06

## 2022-06-19 RX ADMIN — INSULIN DETEMIR 20 UNITS: 100 INJECTION, SOLUTION SUBCUTANEOUS at 09:06

## 2022-06-19 RX ADMIN — POLYETHYLENE GLYCOL 3350 17 G: 17 POWDER, FOR SOLUTION ORAL at 09:06

## 2022-06-19 RX ADMIN — INSULIN ASPART 1 UNITS: 100 INJECTION, SOLUTION INTRAVENOUS; SUBCUTANEOUS at 10:06

## 2022-06-19 RX ADMIN — HYDROCODONE BITARTRATE AND ACETAMINOPHEN 1 TABLET: 5; 325 TABLET ORAL at 09:06

## 2022-06-19 RX ADMIN — METHOCARBAMOL TABLETS 500 MG: 500 TABLET, COATED ORAL at 01:06

## 2022-06-19 RX ADMIN — ACETAMINOPHEN 650 MG: 325 TABLET ORAL at 09:06

## 2022-06-19 RX ADMIN — METHOCARBAMOL TABLETS 500 MG: 500 TABLET, COATED ORAL at 05:06

## 2022-06-19 RX ADMIN — HEPARIN SODIUM 26 UNITS/KG/HR: 5000 INJECTION INTRAVENOUS; SUBCUTANEOUS at 10:06

## 2022-06-19 RX ADMIN — MILRINONE LACTATE IN DEXTROSE 0.38 MCG/KG/MIN: 200 INJECTION, SOLUTION INTRAVENOUS at 02:06

## 2022-06-19 RX ADMIN — HYDROCODONE BITARTRATE AND ACETAMINOPHEN 1 TABLET: 5; 325 TABLET ORAL at 01:06

## 2022-06-19 RX ADMIN — SENNOSIDES AND DOCUSATE SODIUM 1 TABLET: 50; 8.6 TABLET ORAL at 09:06

## 2022-06-19 RX ADMIN — INSULIN ASPART 3 UNITS: 100 INJECTION, SOLUTION INTRAVENOUS; SUBCUTANEOUS at 05:06

## 2022-06-19 NOTE — PROGRESS NOTES
06/19/2022  John Alaniz    Current provider:  Josh Pulido MD    Device interrogation:  No flowsheet data found.       Rounded on Kevan Bernice to ensure all mechanical assist device settings (IABP or VAD) were appropriate and all parameters were within limits.  I was able to ensure all back up equipment was present, the staff had no issues, and the Perfusion Department daily rounding was complete.      For implantable VADs: Interrogation of Ventricular assist device was performed with analysis of device parameters and review of device function. I have personally reviewed the interrogation findings and agree with findings as stated.     In emergency, the nursing units have been notified to contact the perfusion department either by:  Calling [phone] from 630am to 4pm Mon thru Fri, utilizing the On-Call Finder functionality of Epic and searching for Perfusion, or by contacting the hospital  from 4pm to 630am and on weekends and asking to speak with the perfusionist on call.    5:36 PM

## 2022-06-19 NOTE — SUBJECTIVE & OBJECTIVE
Interval HPI:   Overnight events: Admitted to CICU. IABP. BG above goal with prn correction scale. Creatinine 1.5.   Eating:  Diet diabetic [facility]; 2000 Calorie; Cardiac (Low Na/Chol); Isolation Tray - Regular [place]; Fluid - 1500mL   100%  Nausea: No  Hypoglycemia and intervention: No  Fever: No  TPN and/or TF: No    PMH, PSH, FH, SH  reviewed       Review of Systems  Constitutional: Negative for weight changes.  Eyes: Negative for visual disturbance.  Respiratory: +for cough.   Cardiovascular: Negative for chest pain.  Gastrointestinal: Negative for nausea.  Endocrine: Negative for polyuria, polydipsia.  Musculoskeletal: Negative for back pain.  Skin: Negative for rash.  Neurological: Negative for syncope.  Psychiatric/Behavioral: Negative for depression.      Current Medications and/or Treatments Impacting Glycemic Control  Immunotherapy:    Immunosuppressants       None          Steroids:   Hormones (From admission, onward)                None          Pressors:    Autonomic Drugs (From admission, onward)                Start     Stop Route Frequency Ordered    06/13/22 2130  EPINEPHrine (ADRENALIN) 5 mg in dextrose 5 % 250 mL infusion         -- IV Continuous 06/13/22 2024          Hyperglycemia/Diabetes Medications:   Antihyperglycemics (From admission, onward)                Start     Stop Route Frequency Ordered    06/14/22 0953  insulin aspart U-100 pen 0-5 Units         -- SubQ Before meals & nightly PRN 06/14/22 0854             PHYSICAL EXAMINATION:  Vitals:    06/19/22 0840   BP:    Pulse: 109   Resp: 12   Temp:      Body mass index is 26.23 kg/m².    Physical Exam  Constitutional: Well developed, well nourished, NAD.  ENT: External ears no masses with nose patent; normal hearing.  Neck: Supple; trachea midline  Cardiovascular: Normal heart sounds, no LE edema. DP doppler. IABP  Lungs: Normal effort; lungs anterior bilaterally clear to auscultation.  Abdomen: Soft, no masses, no hernias.  MS:  No clubbing or cyanosis of nails noted;  unable to assess gait.  Skin: No rashes, lesions, or ulcers; no nodules. Injection sites are ok. No lipo hypertropthy or atrophy.  Psychiatric: Good judgement and insight; normal mood and affect.  Neurological: Cranial nerves are grossly intact.   Foot: nails in good condition, no amputations noted.

## 2022-06-19 NOTE — PROGRESS NOTES
06/18/2022  John Alaniz    Current provider:  Josh Pulido MD    Device interrogation:  No flowsheet data found.       Rounded on Kevan Bernice to ensure all mechanical assist device settings (IABP or VAD) were appropriate and all parameters were within limits.  I was able to ensure all back up equipment was present, the staff had no issues, and the Perfusion Department daily rounding was complete.      For implantable VADs: Interrogation of Ventricular assist device was performed with analysis of device parameters and review of device function. I have personally reviewed the interrogation findings and agree with findings as stated.     In emergency, the nursing units have been notified to contact the perfusion department either by:  Calling [phone] from 630am to 4pm Mon thru Fri, utilizing the On-Call Finder functionality of Epic and searching for Perfusion, or by contacting the hospital  from 4pm to 630am and on weekends and asking to speak with the perfusionist on call.    9:30 PM

## 2022-06-19 NOTE — PLAN OF CARE
PM Hemodynamics:     MAP 70  CVP 7  SvO2 61  SaO2 98    CO 5.3  CI 2.4      No changes to current care plan.     Roland Aguilar MD  [facility]  Cardiovascular Disease, PGY-IV

## 2022-06-19 NOTE — ASSESSMENT & PLAN NOTE
"-[facility]  -Approved for LVAD at [facility] on 4/2/22. Was not evaluated for OHTx as he quit smoking in April 2022  -Moved to ICU 6/13 in the setting of significant volume overloaded and low output state. IABP placed 6/13 and Epi and Marcella added for RV support  -Lasix gtt D/C"d 6/17 2/2 increase in sCr. Given 250 cc IV NS yesterday and sCr is back down to 1.5 from 2.0  -CVP: 10, SVO2: 62, CO:6.23, CI:2.79, SVR: 821   -IABP 1:1, IV milrinone 0.375, epi 0.02, and Marcella 10 ppm.   -TTE 6/14: LVEF 10%, G3DD, mild-moderate reduced RVSF, moderate-severe MR, moderate-sever tr, CVP 15, PASP 56, moderate PH, LVEDD 7.59  -RHC done 4/19/22 on Milrinone 0.5 mcg/kg/min: RA 11, PA 50/27 (35), W 27, CO/CI 3.7/1.7, PVR 2.2 and SVR 1535   -GDMT: Entresto and Aldactone on hold since admit 5/24-5/27 2/2 elevated sCr  -Reports dry weight is 217#, and that he had gained 6# on his home scale PTA  -Tentative VAD date 6/23. Unlikely OHTx candidate with smoking history (quit April 2022)         "

## 2022-06-19 NOTE — HPI
"Reason for Consult: Management of  type 2 DM, Hyperglycemia     Surgical Procedure and Date: none    Diabetes diagnosis year: 4/21/21    Home Diabetes Medications:  Detemir  23  units daily and Aspart   12  units TIDWM and  Mod dose correction insulin    Lab Results   Component Value Date    HGBA1C 9.2 (H) 05/20/2022           How often checking glucose at home? 1-3 x day   BG readings on regimen: 180- up to 300 once  Hypoglycemia on the regimen?  yes once- related to not really eating after taking aspart   Missed doses on regimen?  no    Diabetes Complications include:     Hyperglycemia    Complicating diabetes co morbidities:   History of MI, CHF, and CKD      HPI:   Patient is a 37 y.o. male with a diagnosis of type 2 DM and NIDCM with BiV systolic heart failure. Patient was presented at [facility] 4/2/22  and was  approved for VAD. Also recently admitted with [facility]; he had clinic appointment last week to f/u recent admit for hyperglycemic hyperosmolar syndrome but did not come as he was "feeling too bad" presents to  ED with SOB. Endocrinology consulted for BG/ DM management.            "

## 2022-06-19 NOTE — ASSESSMENT & PLAN NOTE
-Admitted 5/24-5/27 with hyperglycemia hyperosmolar syndrome  -Hgb A1C 9.2 on 5/20/22  -Glucose per labs today 248  -SSI for now  -Consult Endocrine for better control

## 2022-06-19 NOTE — PROGRESS NOTES
[facility] - Cardiac Intensive Care  Heart Transplant  Progress Note    Patient Name: Kevan Queen  MRN: [id number]  Admission Date: 6/13/2022  Hospital Length of Stay: 6 days  Attending Physician: Josh Pulido MD  Primary Care Provider: ORALIA Cline  Principal Problem:Acute on chronic combined systolic and diastolic heart failure, NYHA class 4    Subjective:     **Interval History: C/O ongoing discomfort RFA IABP site. No palpable hematoma, no drainage. Distal pulses intact. Will try decreasing scheduled Hydrocodone and adding Gabapentin. sCr has improved to 1.5 from 2.0 after getting 250 cc IV NS yesterday. CVP 10. sVO2 with IABP 1:1, Milrinone 0.375, Epi 0.02 and Marcella 62 with CO/CI 6.23/2.79 and       Continuous Infusions:   sodium chloride 0.9% Stopped (06/18/22 0017)    EPINEPHrine 0.02 mcg/kg/min (06/19/22 0300)    heparin (porcine) in D5W 25 Units/kg/hr (06/19/22 0631)    milrinone 20mg/100ml D5W (200mcg/ml) 0.375 mcg/kg/min (06/19/22 0501)    nitric oxide gas       Scheduled Meds:   acetaminophen  650 mg Oral Q8H    aspirin  81 mg Oral Daily    busPIRone  7.5 mg Oral Q12H    gabapentin  300 mg Oral TID    HYDROcodone-acetaminophen  1 tablet Oral Q8H    methocarbamoL  500 mg Oral QID    mirtazapine  15 mg Oral Nightly    pantoprazole  40 mg Oral Daily    polyethylene glycol  17 g Oral BID    potassium chloride  30 mEq Oral BID    senna-docusate 8.6-50 mg  1 tablet Oral Daily    sucralfate  1 g Oral Nightly     PRN Meds:bisacodyL, dextrose 10%, dextrose 10%, glucagon (human recombinant), glucose, glucose, insulin aspart U-100, magnesium oxide, magnesium oxide, potassium bicarbonate, potassium bicarbonate, potassium bicarbonate, potassium, sodium phosphates, potassium, sodium phosphates, potassium, sodium phosphates, promethazine, sodium chloride 0.9%    Review of patient's allergies indicates:   Allergen Reactions    Bumex [bumetanide] Hives    Lactose Diarrhea     Other  reaction(s): Abdominal distension, gaseous    Torsemide Hives     Objective:     Vital Signs (Most Recent):  Temp: 98.1 °F (36.7 °C) (06/19/22 0300)  Pulse: 110 (06/19/22 0500)  Resp: (!) 39 (06/19/22 0500)  BP: 102/64 (06/19/22 0500)  SpO2: 97 % (06/19/22 0500)   Vital Signs (24h Range):  Temp:  [97.9 °F (36.6 °C)-98.2 °F (36.8 °C)] 98.1 °F (36.7 °C)  Pulse:  [] 110  Resp:  [10-39] 39  SpO2:  [92 %-99 %] 97 %  BP: ()/(56-88) 102/64     Patient Vitals for the past 72 hrs (Last 3 readings):   Weight   06/19/22 0300 95.2 kg (209 lb 14.1 oz)   06/18/22 0600 94.2 kg (207 lb 10.8 oz)     Body mass index is 26.23 kg/m².      Intake/Output Summary (Last 24 hours) at 6/19/2022 0709  Last data filed at 6/19/2022 0500  Gross per 24 hour   Intake 2374.12 ml   Output 3525 ml   Net -1150.88 ml       Hemodynamic Parameters:       Telemetry: ST    Physical Exam  Constitutional:       Appearance: Normal appearance.   HENT:      Head: Normocephalic and atraumatic.   Eyes:      Conjunctiva/sclera: Conjunctivae normal.      Pupils: Pupils are equal, round, and reactive to light.   Neck:      Comments: RIJ TLC  Cardiovascular:      Rate and Rhythm: Regular rhythm. Tachycardia present.      Comments: +S3  Pulmonary:      Effort: Pulmonary effort is normal.      Breath sounds: Normal breath sounds.   Abdominal:      General: Bowel sounds are normal. There is distension.   Musculoskeletal:         General: No swelling. Normal range of motion.      Cervical back: Neck supple.   Skin:     General: Skin is warm and dry.      Capillary Refill: Capillary refill takes 2 to 3 seconds.   Neurological:      General: No focal deficit present.      Mental Status: He is alert and oriented to person, place, and time.   Psychiatric:         Mood and Affect: Mood normal.         Behavior: Behavior normal.         Thought Content: Thought content normal.         Judgment: Judgment normal.       Significant Labs:  CBC:  Recent Labs   Lab  06/17/22  0332 06/18/22  0325 06/19/22  0411   WBC 8.06 10.26 9.16   RBC 4.24* 4.19* 3.75*   HGB 12.6* 12.5* 11.2*   HCT 36.8* 37.0* 33.7*    363 328   MCV 87 88 90   MCH 29.7 29.8 29.9   MCHC 34.2 33.8 33.2     BNP:  Recent Labs   Lab 06/13/22  0901 06/17/22 0332   BNP 1,701* 318*     CMP:  Recent Labs   Lab 06/17/22  0332 06/17/22  1233 06/18/22  0325 06/18/22  1543 06/19/22  0411   *   < > 265* 220* 248*   CALCIUM 10.3   < > 10.3 10.3 10.1   ALBUMIN 4.0  --  3.5  --  3.3*   PROT 8.2  --  8.1  --  7.4      < > 132* 131* 132*   K 3.7   < > 3.8 4.4 4.0   CO2 36*   < > 36* 32* 29   CL 83*   < > 85* 90* 92*   BUN 32*   < > 40* 38* 34*   CREATININE 2.0*   < > 2.0* 1.8* 1.5*   ALKPHOS 111  --  114  --  101   ALT 22  --  17  --  17   AST 29  --  27  --  23   BILITOT 1.3*  --  0.9  --  0.6    < > = values in this interval not displayed.      Coagulation:   Recent Labs   Lab 06/13/22 2150 06/14/22  0549 06/18/22  1243 06/18/22  2205 06/19/22  0552   INR 1.2  --   --   --   --    APTT 26.8   < > 37.4* 38.1* 37.6*    < > = values in this interval not displayed.     LDH:  No results for input(s): LDH in the last 72 hours.  Microbiology:  Microbiology Results (last 7 days)       ** No results found for the last 168 hours. **            I have reviewed all pertinent labs within the past 24 hours.    Estimated Creatinine Clearance: 80.6 mL/min (A) (based on SCr of 1.5 mg/dL (H)).    Diagnostic Results:  I have reviewed and interpreted all pertinent imaging results/findings within the past 24 hours.    Assessment and Plan:     36 yo male with NIDCM with BiV systolic heart failure, on home Milrinone at 0.375 mcg/kg/min, presented at [facility] 4/2/22 ,was not evaluated for OHT as he has recently quit smoking in April 2022 but was approved for VAD with plan to begin OHT evaluation in upcoming months if Mr Queen is stable and suitable for OHT eval (blood group A), issues with frozen shoulder following ICD  "implant in the past, had clinic appointment last week to f/u recent admit for hyperglycemic hyperosmolar syndrome but did not come as he was "feeling too bad" presents to our ED with SOB at rest for 1 week, 6# weight gain (reports dry weight is 217#), inability to sleep past 3 nights 2/2 SOB (says he sleep on his side). Went to ED at [facility] 6/10 but left after waiting 4 hours. Had clinic appointment with us today, but arrived to [facility] early this morning and decided to go to the ED instead. Baseline Lasix dose is 80 mg bid. Reports taking 240 mg qd past 3 days with no improvement. BNP is 1701, up from 898 on 6/2 and 49 on 5/24. sCr is 1.8 with baseline ~ 1.5-1.7. sPO2 on RA is 93%. Wife at bedside    He has been given Lasix 80 mg IVP in the ED with plan to start Lasix gtt at 20 mg/hr           * Acute on chronic combined systolic and diastolic heart failure, NYHA class 4  -[facility]  -Approved for LVAD at [facility] on 4/2/22. Was not evaluated for OHTx as he quit smoking in April 2022  -Moved to ICU 6/13 in the setting of significant volume overloaded and low output state. IABP placed 6/13 and Epi and Marcella added for RV support  -Lasix gtt D/C"d 6/17 2/2 increase in sCr. Given 250 cc IV NS yesterday and sCr is back down to 1.5 from 2.0  -CVP: 10, SVO2: 62, CO:6.23, CI:2.79, SVR: 821   -IABP 1:1, IV milrinone 0.375, epi 0.02, and Marcella 10 ppm.   -TTE 6/14: LVEF 10%, G3DD, mild-moderate reduced RVSF, moderate-severe MR, moderate-sever tr, CVP 15, PASP 56, moderate PH, LVEDD 7.59  -RHC done 4/19/22 on Milrinone 0.5 mcg/kg/min: RA 11, PA 50/27 (35), W 27, CO/CI 3.7/1.7, PVR 2.2 and SVR 1535   -GDMT: Entresto and Aldactone on hold since admit 5/24-5/27 2/2 elevated sCr  -Reports dry weight is 217#, and that he had gained 6# on his home scale PTA  -Tentative VAD date 6/23. Unlikely OHTx candidate with smoking history (quit April 2022)           Muscle cramping  -Will try decreasing scheduled Percocet and starting " Gabapentin    Uncontrolled diabetes mellitus  -Admitted 5/24-5/27 with hyperglycemia hyperosmolar syndrome  -Hgb A1C 9.2 on 5/20/22  -Glucose per labs today 248  -SSI for now  -Consult Endocrine for better control    CKD (chronic kidney disease) stage 3, GFR 30-59 ml/min  -Admit sCr 1.8, baseline ~ 1.5-1.7  -Improved from 2.0 yesterday to 1.5 today since Lasix D/C'd Friday and given 250 cc IV NS yesterday  -Monitor     Cardiogenic shock  -See A/C CHF      Uninterrupted Critical Care/Counseling Time (not including procedures): 60 minutes      Alana Cain NP [id number]  Heart Transplant  [facility] - Cardiac Int[facility]

## 2022-06-19 NOTE — ASSESSMENT & PLAN NOTE
BG goal 140-180    Start levemir 20 units daily.   Start novolog 8 units with meals.   BG monitoring ac/hs and low dose correction scale.

## 2022-06-19 NOTE — ASSESSMENT & PLAN NOTE
Titrate insulin slowly to avoid hypoglycemia as the risk of hypoglycemia increases with decreased creatinine clearance.    Estimated Creatinine Clearance: 80.6 mL/min (A) (based on SCr of 1.5 mg/dL (H)).

## 2022-06-19 NOTE — CONSULTS
"[facility] - Cardiac Intensive Care  Endocrinology  Diabetes Consult Note    Consult Requested by: Josh Pulido MD   Reason for admit: Acute on chronic combined systolic and diastolic heart failure, NYHA class 4    HISTORY OF PRESENT ILLNESS:  Reason for Consult: Management of  type 2 DM, Hyperglycemia     Surgical Procedure and Date: none    Diabetes diagnosis year: 4/21/21    Home Diabetes Medications:  Detemir  23  units daily and Aspart   12  units TIDWM and  Mod dose correction insulin    Lab Results   Component Value Date    HGBA1C 9.2 (H) 05/20/2022           How often checking glucose at home? 1-3 x day   BG readings on regimen: 180- up to 300 once  Hypoglycemia on the regimen?  yes once- related to not really eating after taking aspart   Missed doses on regimen?  no    Diabetes Complications include:     Hyperglycemia    Complicating diabetes co morbidities:   History of MI, CHF, and CKD      HPI:   Patient is a 37 y.o. male with a diagnosis of type 2 DM and NIDCM with BiV systolic heart failure. Patient was presented at [facility] 4/2/22  and was  approved for VAD. Also recently admitted with [facility]; he had clinic appointment last week to f/u recent admit for hyperglycemic hyperosmolar syndrome but did not come as he was "feeling too bad" presents to  ED with SOB. Endocrinology consulted for BG/ DM management.                Interval HPI:   Overnight events: Admitted to CICU. IABP. BG above goal with prn correction scale. Creatinine 1.5.   Eating:  Diet diabetic [facility]; 2000 Calorie; Cardiac (Low Na/Chol); Isolation Tray - Regular [place]; Fluid - 1500mL   100%  Nausea: No  Hypoglycemia and intervention: No  Fever: No  TPN and/or TF: No    PMH, PSH, FH, SH  reviewed       Review of Systems  Constitutional: Negative for weight changes.  Eyes: Negative for visual disturbance.  Respiratory: +for cough.   Cardiovascular: Negative for chest pain.  Gastrointestinal: Negative for nausea.  Endocrine: " Negative for polyuria, polydipsia.  Musculoskeletal: Negative for back pain.  Skin: Negative for rash.  Neurological: Negative for syncope.  Psychiatric/Behavioral: Negative for depression.      Current Medications and/or Treatments Impacting Glycemic Control  Immunotherapy:    Immunosuppressants       None          Steroids:   Hormones (From admission, onward)                None          Pressors:    Autonomic Drugs (From admission, onward)                Start     Stop Route Frequency Ordered    06/13/22 2130  EPINEPHrine (ADRENALIN) 5 mg in dextrose 5 % 250 mL infusion         -- IV Continuous 06/13/22 2024          Hyperglycemia/Diabetes Medications:   Antihyperglycemics (From admission, onward)                Start     Stop Route Frequency Ordered    06/14/22 0953  insulin aspart U-100 pen 0-5 Units         -- SubQ Before meals & nightly PRN 06/14/22 0854             PHYSICAL EXAMINATION:  Vitals:    06/19/22 0840   BP:    Pulse: 109   Resp: 12   Temp:      Body mass index is 26.23 kg/m².    Physical Exam  Constitutional: Well developed, well nourished, NAD.  ENT: External ears no masses with nose patent; normal hearing.  Neck: Supple; trachea midline  Cardiovascular: Normal heart sounds, no LE edema. DP doppler. IABP  Lungs: Normal effort; lungs anterior bilaterally clear to auscultation.  Abdomen: Soft, no masses, no hernias.  MS: No clubbing or cyanosis of nails noted;  unable to assess gait.  Skin: No rashes, lesions, or ulcers; no nodules. Injection sites are ok. No lipo hypertropthy or atrophy.  Psychiatric: Good judgement and insight; normal mood and affect.  Neurological: Cranial nerves are grossly intact.   Foot: nails in good condition, no amputations noted.        Labs Reviewed and Include   Recent Labs   Lab 06/19/22  0411   *   CALCIUM 10.1   ALBUMIN 3.3*   PROT 7.4   *   K 4.0   CO2 29   CL 92*   BUN 34*   CREATININE 1.5*   ALKPHOS 101   ALT 17   AST 23   BILITOT 0.6     Lab Results    Component Value Date    WBC 9.16 06/19/2022    HGB 11.2 (L) 06/19/2022    HCT 33.7 (L) 06/19/2022    MCV 90 06/19/2022     06/19/2022     No results for input(s): TSH, FREET4 in the last 168 hours.  Lab Results   Component Value Date    HGBA1C 9.2 (H) 05/20/2022       Nutritional status:   Body mass index is 26.23 kg/m².  Lab Results   Component Value Date    ALBUMIN 3.3 (L) 06/19/2022    ALBUMIN 3.5 06/18/2022    ALBUMIN 4.0 06/17/2022     Lab Results   Component Value Date    PREALBUMIN 36 04/18/2022       Estimated Creatinine Clearance: 80.6 mL/min (A) (based on SCr of 1.5 mg/dL (H)).    Accu-Checks  Recent Labs     06/16/22  1157 06/16/22  2148 06/17/22  0757 06/17/22  1228 06/17/22  1628 06/17/22  1631 06/17/22  2140 06/18/22  1649 06/18/22  2159   POCTGLUCOSE 172* 171* 153* 170* 234* 179* 219* 221* 277*        ASSESSMENT and PLAN    * Acute on chronic combined systolic and diastolic heart failure, NYHA class 4  Optimize glucose control and  avoid hypoglycemia    Managed per primary.       Uncontrolled diabetes mellitus  BG goal 140-180    Start levemir 20 units daily.   Start novolog 8 units with meals.   BG monitoring ac/hs and low dose correction scale.       CKD (chronic kidney disease) stage 3, GFR 30-59 ml/min    Titrate insulin slowly to avoid hypoglycemia as the risk of hypoglycemia increases with decreased creatinine clearance.    Estimated Creatinine Clearance: 80.6 mL/min (A) (based on SCr of 1.5 mg/dL (H)).          Plan discussed with patient, family, and RN at bedside.     Yumiko Fowler NP  Endocrinology  [facility] - Cardiac Intensive Care

## 2022-06-19 NOTE — SUBJECTIVE & OBJECTIVE
**Interval History: C/O ongoing discomfort RFA IABP site. No palpable hematoma, no drainage. Distal pulses intact. Will try decreasing scheduled Hydrocodone and adding Gabapentin. sCr has improved to 1.5 from 2.0 after getting 250 cc IV NS yesterday. CVP 10. sVO2 with IABP 1:1, Milrinone 0.375, Epi 0.02 and Marcella 62 with CO/CI 6.23/2.79 and       Continuous Infusions:   sodium chloride 0.9% Stopped (06/18/22 0017)    EPINEPHrine 0.02 mcg/kg/min (06/19/22 0300)    heparin (porcine) in D5W 25 Units/kg/hr (06/19/22 0631)    milrinone 20mg/100ml D5W (200mcg/ml) 0.375 mcg/kg/min (06/19/22 0501)    nitric oxide gas       Scheduled Meds:   acetaminophen  650 mg Oral Q8H    aspirin  81 mg Oral Daily    busPIRone  7.5 mg Oral Q12H    gabapentin  300 mg Oral TID    HYDROcodone-acetaminophen  1 tablet Oral Q8H    methocarbamoL  500 mg Oral QID    mirtazapine  15 mg Oral Nightly    pantoprazole  40 mg Oral Daily    polyethylene glycol  17 g Oral BID    potassium chloride  30 mEq Oral BID    senna-docusate 8.6-50 mg  1 tablet Oral Daily    sucralfate  1 g Oral Nightly     PRN Meds:bisacodyL, dextrose 10%, dextrose 10%, glucagon (human recombinant), glucose, glucose, insulin aspart U-100, magnesium oxide, magnesium oxide, potassium bicarbonate, potassium bicarbonate, potassium bicarbonate, potassium, sodium phosphates, potassium, sodium phosphates, potassium, sodium phosphates, promethazine, sodium chloride 0.9%    Review of patient's allergies indicates:   Allergen Reactions    Bumex [bumetanide] Hives    Lactose Diarrhea     Other reaction(s): Abdominal distension, gaseous    Torsemide Hives     Objective:     Vital Signs (Most Recent):  Temp: 98.1 °F (36.7 °C) (06/19/22 0300)  Pulse: 110 (06/19/22 0500)  Resp: (!) 39 (06/19/22 0500)  BP: 102/64 (06/19/22 0500)  SpO2: 97 % (06/19/22 0500)   Vital Signs (24h Range):  Temp:  [97.9 °F (36.6 °C)-98.2 °F (36.8 °C)] 98.1 °F (36.7 °C)  Pulse:  [] 110  Resp:  [10-39]  39  SpO2:  [92 %-99 %] 97 %  BP: ()/(56-88) 102/64     Patient Vitals for the past 72 hrs (Last 3 readings):   Weight   06/19/22 0300 95.2 kg (209 lb 14.1 oz)   06/18/22 0600 94.2 kg (207 lb 10.8 oz)     Body mass index is 26.23 kg/m².      Intake/Output Summary (Last 24 hours) at 6/19/2022 0709  Last data filed at 6/19/2022 0500  Gross per 24 hour   Intake 2374.12 ml   Output 3525 ml   Net -1150.88 ml       Hemodynamic Parameters:       Telemetry: ST    Physical Exam  Constitutional:       Appearance: Normal appearance.   HENT:      Head: Normocephalic and atraumatic.   Eyes:      Conjunctiva/sclera: Conjunctivae normal.      Pupils: Pupils are equal, round, and reactive to light.   Neck:      Comments: RIJ TLC  Cardiovascular:      Rate and Rhythm: Regular rhythm. Tachycardia present.      Comments: +S3  Pulmonary:      Effort: Pulmonary effort is normal.      Breath sounds: Normal breath sounds.   Abdominal:      General: Bowel sounds are normal. There is distension.   Musculoskeletal:         General: No swelling. Normal range of motion.      Cervical back: Neck supple.   Skin:     General: Skin is warm and dry.      Capillary Refill: Capillary refill takes 2 to 3 seconds.   Neurological:      General: No focal deficit present.      Mental Status: He is alert and oriented to person, place, and time.   Psychiatric:         Mood and Affect: Mood normal.         Behavior: Behavior normal.         Thought Content: Thought content normal.         Judgment: Judgment normal.       Significant Labs:  CBC:  Recent Labs   Lab 06/17/22  0332 06/18/22  0325 06/19/22  0411   WBC 8.06 10.26 9.16   RBC 4.24* 4.19* 3.75*   HGB 12.6* 12.5* 11.2*   HCT 36.8* 37.0* 33.7*    363 328   MCV 87 88 90   MCH 29.7 29.8 29.9   MCHC 34.2 33.8 33.2     BNP:  Recent Labs   Lab 06/13/22  0901 06/17/22  0332   BNP 1,701* 318*     CMP:  Recent Labs   Lab 06/17/22  0332 06/17/22  1233 06/18/22  0325 06/18/22  1543 06/19/22  0411    *   < > 265* 220* 248*   CALCIUM 10.3   < > 10.3 10.3 10.1   ALBUMIN 4.0  --  3.5  --  3.3*   PROT 8.2  --  8.1  --  7.4      < > 132* 131* 132*   K 3.7   < > 3.8 4.4 4.0   CO2 36*   < > 36* 32* 29   CL 83*   < > 85* 90* 92*   BUN 32*   < > 40* 38* 34*   CREATININE 2.0*   < > 2.0* 1.8* 1.5*   ALKPHOS 111  --  114  --  101   ALT 22  --  17  --  17   AST 29  --  27  --  23   BILITOT 1.3*  --  0.9  --  0.6    < > = values in this interval not displayed.      Coagulation:   Recent Labs   Lab 06/13/22  2150 06/14/22  0549 06/18/22  1243 06/18/22  2205 06/19/22  0552   INR 1.2  --   --   --   --    APTT 26.8   < > 37.4* 38.1* 37.6*    < > = values in this interval not displayed.     LDH:  No results for input(s): LDH in the last 72 hours.  Microbiology:  Microbiology Results (last 7 days)       ** No results found for the last 168 hours. **            I have reviewed all pertinent labs within the past 24 hours.    Estimated Creatinine Clearance: 80.6 mL/min (A) (based on SCr of 1.5 mg/dL (H)).    Diagnostic Results:  I have reviewed and interpreted all pertinent imaging results/findings within the past 24 hours.

## 2022-06-20 ENCOUNTER — DOCUMENTATION ONLY (OUTPATIENT)
Dept: CARDIOTHORACIC SURGERY | Facility: CLINIC | Age: 37
End: 2022-06-20
Payer: MEDICAID

## 2022-06-20 LAB
ABO + RH BLD: NORMAL
ALBUMIN SERPL BCP-MCNC: 3.2 G/DL (ref 3.5–5.2)
ALLENS TEST: ABNORMAL
ALLENS TEST: ABNORMAL
ALP SERPL-CCNC: 102 U/L (ref 55–135)
ALT SERPL W/O P-5'-P-CCNC: 14 U/L (ref 10–44)
ANION GAP SERPL CALC-SCNC: 7 MMOL/L (ref 8–16)
ANION GAP SERPL CALC-SCNC: 9 MMOL/L (ref 8–16)
APTT BLDCRRT: 49.2 SEC (ref 21–32)
AST SERPL-CCNC: 22 U/L (ref 10–40)
BASOPHILS # BLD AUTO: 0.04 K/UL (ref 0–0.2)
BASOPHILS NFR BLD: 0.4 % (ref 0–1.9)
BILIRUB SERPL-MCNC: 0.6 MG/DL (ref 0.1–1)
BLD GP AB SCN CELLS X3 SERPL QL: NORMAL
BUN SERPL-MCNC: 23 MG/DL (ref 6–20)
BUN SERPL-MCNC: 24 MG/DL (ref 6–20)
CALCIUM SERPL-MCNC: 10.2 MG/DL (ref 8.7–10.5)
CALCIUM SERPL-MCNC: 9.9 MG/DL (ref 8.7–10.5)
CHLORIDE SERPL-SCNC: 95 MMOL/L (ref 95–110)
CHLORIDE SERPL-SCNC: 97 MMOL/L (ref 95–110)
CO2 SERPL-SCNC: 29 MMOL/L (ref 23–29)
CO2 SERPL-SCNC: 30 MMOL/L (ref 23–29)
CREAT SERPL-MCNC: 1.3 MG/DL (ref 0.5–1.4)
CREAT SERPL-MCNC: 1.5 MG/DL (ref 0.5–1.4)
DELSYS: ABNORMAL
DELSYS: ABNORMAL
DIFFERENTIAL METHOD: ABNORMAL
EOSINOPHIL # BLD AUTO: 0.1 K/UL (ref 0–0.5)
EOSINOPHIL NFR BLD: 0.9 % (ref 0–8)
ERYTHROCYTE [DISTWIDTH] IN BLOOD BY AUTOMATED COUNT: 14.4 % (ref 11.5–14.5)
EST. GFR  (AFRICAN AMERICAN): >60 ML/MIN/1.73 M^2
EST. GFR  (AFRICAN AMERICAN): >60 ML/MIN/1.73 M^2
EST. GFR  (NON AFRICAN AMERICAN): 58.6 ML/MIN/1.73 M^2
EST. GFR  (NON AFRICAN AMERICAN): >60 ML/MIN/1.73 M^2
GLUCOSE SERPL-MCNC: 169 MG/DL (ref 70–110)
GLUCOSE SERPL-MCNC: 265 MG/DL (ref 70–110)
HCO3 UR-SCNC: 30.8 MMOL/L (ref 24–28)
HCO3 UR-SCNC: 30.9 MMOL/L (ref 24–28)
HCT VFR BLD AUTO: 31.2 % (ref 40–54)
HGB BLD-MCNC: 10.5 G/DL (ref 14–18)
IMM GRANULOCYTES # BLD AUTO: 0.02 K/UL (ref 0–0.04)
IMM GRANULOCYTES NFR BLD AUTO: 0.2 % (ref 0–0.5)
INR PPP: 1 (ref 0.8–1.2)
LACTATE SERPL-SCNC: 0.9 MMOL/L (ref 0.5–2.2)
LYMPHOCYTES # BLD AUTO: 1.7 K/UL (ref 1–4.8)
LYMPHOCYTES NFR BLD: 18.1 % (ref 18–48)
MAGNESIUM SERPL-MCNC: 2.1 MG/DL (ref 1.6–2.6)
MCH RBC QN AUTO: 30.3 PG (ref 27–31)
MCHC RBC AUTO-ENTMCNC: 33.7 G/DL (ref 32–36)
MCV RBC AUTO: 90 FL (ref 82–98)
METHEMOGLOBIN: 0.2 % (ref 0–3)
MONOCYTES # BLD AUTO: 0.8 K/UL (ref 0.3–1)
MONOCYTES NFR BLD: 7.9 % (ref 4–15)
NEUTROPHILS # BLD AUTO: 7 K/UL (ref 1.8–7.7)
NEUTROPHILS NFR BLD: 72.5 % (ref 38–73)
NRBC BLD-RTO: 0 /100 WBC
PCO2 BLDA: 51.9 MMHG (ref 35–45)
PCO2 BLDA: 56.8 MMHG (ref 35–45)
PH SMN: 7.34 [PH] (ref 7.35–7.45)
PH SMN: 7.38 [PH] (ref 7.35–7.45)
PLATELET # BLD AUTO: 300 K/UL (ref 150–450)
PMV BLD AUTO: 9.7 FL (ref 9.2–12.9)
PO2 BLDA: 25 MMHG (ref 40–60)
PO2 BLDA: 28 MMHG (ref 40–60)
POC BE: 5 MMOL/L
POC BE: 6 MMOL/L
POC SATURATED O2: 41 % (ref 95–100)
POC SATURATED O2: 49 % (ref 95–100)
POC TCO2: 32 MMOL/L (ref 24–29)
POC TCO2: 33 MMOL/L (ref 24–29)
POCT GLUCOSE: 166 MG/DL (ref 70–110)
POCT GLUCOSE: 232 MG/DL (ref 70–110)
POCT GLUCOSE: 259 MG/DL (ref 70–110)
POCT GLUCOSE: 260 MG/DL (ref 70–110)
POTASSIUM SERPL-SCNC: 3.9 MMOL/L (ref 3.5–5.1)
POTASSIUM SERPL-SCNC: 4.5 MMOL/L (ref 3.5–5.1)
PROT SERPL-MCNC: 7.4 G/DL (ref 6–8.4)
PROTHROMBIN TIME: 10.8 SEC (ref 9–12.5)
RBC # BLD AUTO: 3.46 M/UL (ref 4.6–6.2)
SAMPLE: ABNORMAL
SAMPLE: ABNORMAL
SITE: ABNORMAL
SITE: ABNORMAL
SODIUM SERPL-SCNC: 133 MMOL/L (ref 136–145)
SODIUM SERPL-SCNC: 134 MMOL/L (ref 136–145)
WBC # BLD AUTO: 9.6 K/UL (ref 3.9–12.7)

## 2022-06-20 PROCEDURE — 99900035 HC TECH TIME PER 15 MIN (STAT): Mod: NTX

## 2022-06-20 PROCEDURE — 27000221 HC OXYGEN, UP TO 24 HOURS: Mod: NTX

## 2022-06-20 PROCEDURE — 86920 COMPATIBILITY TEST SPIN: CPT | Mod: NTX | Performed by: PHYSICIAN ASSISTANT

## 2022-06-20 PROCEDURE — 27000202 HC IABP, ADD'L DAY: Mod: NTX

## 2022-06-20 PROCEDURE — 85730 THROMBOPLASTIN TIME PARTIAL: CPT | Mod: NTX | Performed by: INTERNAL MEDICINE

## 2022-06-20 PROCEDURE — 63600367 HC NITRIC OXIDE PER HOUR: Mod: NTX

## 2022-06-20 PROCEDURE — 86850 RBC ANTIBODY SCREEN: CPT | Mod: NTX | Performed by: INTERNAL MEDICINE

## 2022-06-20 PROCEDURE — 99292 CRITICAL CARE ADDL 30 MIN: CPT | Mod: NTX,,, | Performed by: INTERNAL MEDICINE

## 2022-06-20 PROCEDURE — 25000003 PHARM REV CODE 250: Mod: NTX | Performed by: PHYSICIAN ASSISTANT

## 2022-06-20 PROCEDURE — 25000003 PHARM REV CODE 250: Mod: NTX | Performed by: INTERNAL MEDICINE

## 2022-06-20 PROCEDURE — 85025 COMPLETE CBC W/AUTO DIFF WBC: CPT | Mod: NTX | Performed by: INTERNAL MEDICINE

## 2022-06-20 PROCEDURE — 87040 BLOOD CULTURE FOR BACTERIA: CPT | Mod: NTX | Performed by: PHYSICIAN ASSISTANT

## 2022-06-20 PROCEDURE — 87077 CULTURE AEROBIC IDENTIFY: CPT | Mod: NTX | Performed by: PHYSICIAN ASSISTANT

## 2022-06-20 PROCEDURE — 25000003 PHARM REV CODE 250: Mod: NTX | Performed by: NURSE PRACTITIONER

## 2022-06-20 PROCEDURE — 82803 BLOOD GASES ANY COMBINATION: CPT | Mod: NTX

## 2022-06-20 PROCEDURE — 99291 CRITICAL CARE FIRST HOUR: CPT | Mod: NTX,,, | Performed by: PHYSICIAN ASSISTANT

## 2022-06-20 PROCEDURE — 80048 BASIC METABOLIC PNL TOTAL CA: CPT | Mod: NTX,XB | Performed by: PHYSICIAN ASSISTANT

## 2022-06-20 PROCEDURE — 80053 COMPREHEN METABOLIC PANEL: CPT | Mod: NTX | Performed by: NURSE PRACTITIONER

## 2022-06-20 PROCEDURE — 94761 N-INVAS EAR/PLS OXIMETRY MLT: CPT | Mod: NTX

## 2022-06-20 PROCEDURE — 20000000 HC ICU ROOM: Mod: NTX

## 2022-06-20 PROCEDURE — 63600175 PHARM REV CODE 636 W HCPCS: Mod: NTX | Performed by: PHYSICIAN ASSISTANT

## 2022-06-20 PROCEDURE — 87186 SC STD MICRODIL/AGAR DIL: CPT | Mod: NTX | Performed by: PHYSICIAN ASSISTANT

## 2022-06-20 PROCEDURE — 83605 ASSAY OF LACTIC ACID: CPT | Mod: NTX | Performed by: PHYSICIAN ASSISTANT

## 2022-06-20 PROCEDURE — 83050 HGB METHEMOGLOBIN QUAN: CPT | Mod: NTX

## 2022-06-20 PROCEDURE — 63600175 PHARM REV CODE 636 W HCPCS: Mod: NTX | Performed by: INTERNAL MEDICINE

## 2022-06-20 PROCEDURE — 83735 ASSAY OF MAGNESIUM: CPT | Mod: NTX | Performed by: INTERNAL MEDICINE

## 2022-06-20 PROCEDURE — 85610 PROTHROMBIN TIME: CPT | Mod: NTX | Performed by: PHYSICIAN ASSISTANT

## 2022-06-20 PROCEDURE — 99291 PR CRITICAL CARE, E/M 30-74 MINUTES: ICD-10-PCS | Mod: NTX,,, | Performed by: PHYSICIAN ASSISTANT

## 2022-06-20 PROCEDURE — 63600175 PHARM REV CODE 636 W HCPCS: Mod: NTX | Performed by: NURSE PRACTITIONER

## 2022-06-20 PROCEDURE — 99292 PR CRITICAL CARE, ADDL 30 MIN: ICD-10-PCS | Mod: NTX,,, | Performed by: INTERNAL MEDICINE

## 2022-06-20 RX ORDER — INSULIN ASPART 100 [IU]/ML
12 INJECTION, SOLUTION INTRAVENOUS; SUBCUTANEOUS
Status: DISCONTINUED | OUTPATIENT
Start: 2022-06-20 | End: 2022-06-21

## 2022-06-20 RX ORDER — FUROSEMIDE 10 MG/ML
80 INJECTION INTRAMUSCULAR; INTRAVENOUS ONCE
Status: COMPLETED | OUTPATIENT
Start: 2022-06-20 | End: 2022-06-20

## 2022-06-20 RX ADMIN — HEPARIN SODIUM 26 UNITS/KG/HR: 5000 INJECTION INTRAVENOUS; SUBCUTANEOUS at 12:06

## 2022-06-20 RX ADMIN — HYDROCODONE BITARTRATE AND ACETAMINOPHEN 1 TABLET: 5; 325 TABLET ORAL at 09:06

## 2022-06-20 RX ADMIN — METHOCARBAMOL TABLETS 500 MG: 500 TABLET, COATED ORAL at 09:06

## 2022-06-20 RX ADMIN — FUROSEMIDE 80 MG: 10 INJECTION, SOLUTION INTRAMUSCULAR; INTRAVENOUS at 04:06

## 2022-06-20 RX ADMIN — PANTOPRAZOLE SODIUM 40 MG: 40 TABLET, DELAYED RELEASE ORAL at 08:06

## 2022-06-20 RX ADMIN — METHOCARBAMOL TABLETS 500 MG: 500 TABLET, COATED ORAL at 08:06

## 2022-06-20 RX ADMIN — INSULIN ASPART 12 UNITS: 100 INJECTION, SOLUTION INTRAVENOUS; SUBCUTANEOUS at 05:06

## 2022-06-20 RX ADMIN — INSULIN ASPART 8 UNITS: 100 INJECTION, SOLUTION INTRAVENOUS; SUBCUTANEOUS at 08:06

## 2022-06-20 RX ADMIN — POTASSIUM CHLORIDE 30 MEQ: 10 CAPSULE, COATED, EXTENDED RELEASE ORAL at 09:06

## 2022-06-20 RX ADMIN — POTASSIUM CHLORIDE 30 MEQ: 10 CAPSULE, COATED, EXTENDED RELEASE ORAL at 08:06

## 2022-06-20 RX ADMIN — POLYETHYLENE GLYCOL 3350 17 G: 17 POWDER, FOR SOLUTION ORAL at 08:06

## 2022-06-20 RX ADMIN — METHOCARBAMOL TABLETS 500 MG: 500 TABLET, COATED ORAL at 04:06

## 2022-06-20 RX ADMIN — MILRINONE LACTATE IN DEXTROSE 0.38 MCG/KG/MIN: 200 INJECTION, SOLUTION INTRAVENOUS at 11:06

## 2022-06-20 RX ADMIN — INSULIN DETEMIR 20 UNITS: 100 INJECTION, SOLUTION SUBCUTANEOUS at 08:06

## 2022-06-20 RX ADMIN — BUSPIRONE HYDROCHLORIDE 7.5 MG: 5 TABLET ORAL at 09:06

## 2022-06-20 RX ADMIN — MIRTAZAPINE 15 MG: 15 TABLET, FILM COATED ORAL at 09:06

## 2022-06-20 RX ADMIN — INSULIN ASPART 3 UNITS: 100 INJECTION, SOLUTION INTRAVENOUS; SUBCUTANEOUS at 08:06

## 2022-06-20 RX ADMIN — BUSPIRONE HYDROCHLORIDE 7.5 MG: 5 TABLET ORAL at 08:06

## 2022-06-20 RX ADMIN — EPINEPHRINE 0.02 MCG/KG/MIN: 1 INJECTION INTRAMUSCULAR; INTRAVENOUS; SUBCUTANEOUS at 03:06

## 2022-06-20 RX ADMIN — MILRINONE LACTATE IN DEXTROSE 0.38 MCG/KG/MIN: 200 INJECTION, SOLUTION INTRAVENOUS at 12:06

## 2022-06-20 RX ADMIN — SUCRALFATE 1 G: 1 TABLET ORAL at 09:06

## 2022-06-20 RX ADMIN — PHYTONADIONE 10 MG: 10 INJECTION, EMULSION INTRAMUSCULAR; INTRAVENOUS; SUBCUTANEOUS at 04:06

## 2022-06-20 RX ADMIN — ACETAMINOPHEN 650 MG: 325 TABLET ORAL at 02:06

## 2022-06-20 RX ADMIN — HEPARIN SODIUM 26 UNITS/KG/HR: 5000 INJECTION INTRAVENOUS; SUBCUTANEOUS at 03:06

## 2022-06-20 RX ADMIN — INSULIN ASPART 2 UNITS: 100 INJECTION, SOLUTION INTRAVENOUS; SUBCUTANEOUS at 11:06

## 2022-06-20 RX ADMIN — INSULIN ASPART 1 UNITS: 100 INJECTION, SOLUTION INTRAVENOUS; SUBCUTANEOUS at 09:06

## 2022-06-20 RX ADMIN — ACETAMINOPHEN 650 MG: 325 TABLET ORAL at 06:06

## 2022-06-20 RX ADMIN — ACETAMINOPHEN 650 MG: 325 TABLET ORAL at 09:06

## 2022-06-20 RX ADMIN — INSULIN ASPART 12 UNITS: 100 INJECTION, SOLUTION INTRAVENOUS; SUBCUTANEOUS at 11:06

## 2022-06-20 RX ADMIN — GABAPENTIN 300 MG: 300 CAPSULE ORAL at 04:06

## 2022-06-20 RX ADMIN — HYDROCODONE BITARTRATE AND ACETAMINOPHEN 1 TABLET: 5; 325 TABLET ORAL at 06:06

## 2022-06-20 RX ADMIN — METHOCARBAMOL TABLETS 500 MG: 500 TABLET, COATED ORAL at 12:06

## 2022-06-20 RX ADMIN — MILRINONE LACTATE IN DEXTROSE 0.38 MCG/KG/MIN: 200 INJECTION, SOLUTION INTRAVENOUS at 03:06

## 2022-06-20 RX ADMIN — FUROSEMIDE 80 MG: 10 INJECTION, SOLUTION INTRAMUSCULAR; INTRAVENOUS at 11:06

## 2022-06-20 RX ADMIN — EPINEPHRINE 0.02 MCG/KG/MIN: 1 INJECTION INTRAMUSCULAR; INTRAVENOUS; SUBCUTANEOUS at 06:06

## 2022-06-20 RX ADMIN — MILRINONE LACTATE IN DEXTROSE 0.38 MCG/KG/MIN: 200 INJECTION, SOLUTION INTRAVENOUS at 10:06

## 2022-06-20 RX ADMIN — GABAPENTIN 300 MG: 300 CAPSULE ORAL at 09:06

## 2022-06-20 RX ADMIN — SENNOSIDES AND DOCUSATE SODIUM 1 TABLET: 50; 8.6 TABLET ORAL at 08:06

## 2022-06-20 RX ADMIN — HYDROCODONE BITARTRATE AND ACETAMINOPHEN 1 TABLET: 5; 325 TABLET ORAL at 02:06

## 2022-06-20 RX ADMIN — ASPIRIN 81 MG CHEWABLE TABLET 81 MG: 81 TABLET CHEWABLE at 08:06

## 2022-06-20 RX ADMIN — GABAPENTIN 300 MG: 300 CAPSULE ORAL at 08:06

## 2022-06-20 NOTE — ASSESSMENT & PLAN NOTE
-Admitted 5/24-5/27 with hyperglycemia hyperosmolar syndrome  -Hgb A1C 9.2 on 5/20/22  -Glucose per labs today 265  -Endocrine consulted for better control

## 2022-06-20 NOTE — EICU
Intervention Initiated From:  Bedside    [facility] Communicated with Bedside Nurse regarding:  Time-Out    Nurse Notified:  Yes    Doctor Notified:  Yes    Comments: 1432 called into room for timeout for Dr Sanderson, privileges checked, for L IJ TLC, consent per MD, timeout complete, allergies and labs reviewed with bedside, Manometry used portable chest xray ordered, line sutured in place,Covered with Biopatch and Tegaderm,sterilization maintained, tolerated well, VSS

## 2022-06-20 NOTE — PROGRESS NOTES
06/20/2022  Casey Arizmendi    Current provider:  Josh Pulido MD    Device interrogation:  No flowsheet data found.       Rounded on Kevan Bernice to ensure all mechanical assist device settings (IABP or VAD) were appropriate and all parameters were within limits.  I was able to ensure all back up equipment was present, the staff had no issues, and the Perfusion Department daily rounding was complete.      For implantable VADs: Interrogation of Ventricular assist device was performed with analysis of device parameters and review of device function. I have personally reviewed the interrogation findings and agree with findings as stated.     In emergency, the nursing units have been notified to contact the perfusion department either by:  Calling [phone] from 630am to 4pm Mon thru Fri, utilizing the On-Call Finder functionality of Epic and searching for Perfusion, or by contacting the hospital  from 4pm to 630am and on weekends and asking to speak with the perfusionist on call.    2:18 PM

## 2022-06-20 NOTE — PROGRESS NOTES
Pt currently admitted in the hospital.  I met with him at the bedside to discuss participation in the [employer] registry, as pt is scheduled to receive an LVAD tomorrow. Pt verbalized consent and willingness to participate with the [employer] registry.      Patient completed the KCCQ, EuroQol EQ-5D, and the Trail Making neurocognitive test in 75 seconds.

## 2022-06-20 NOTE — ASSESSMENT & PLAN NOTE
Endocrinology consulted for BG management.   BG goal 140-180    - Levemir Flex Pen 24 units daily  - Novolog (aspart) insulin 12 Units SQ TIDWM and prn for BG excursions MDC SSI (150/25).   - BG checks q4hr  - Hypoglycemia protocol in place      ** Please notify Endocrine for any change and/or advance in diet**  ** Please call Endocrine for any BG related issues **    Discharge Planning:   TBD. Please notify endocrinology prior to discharge.

## 2022-06-20 NOTE — PROCEDURES
"Kevan Queen is a 37 y.o. male patient.    Temp: 98.6 °F (37 °C) (06/20/22 1100)  Pulse: (!) 119 (06/20/22 1400)  Resp: 18 (06/20/22 1420)  BP: 124/72 (06/20/22 1400)  SpO2: 96 % (06/20/22 1400)  Weight: 100.2 kg (220 lb 14.4 oz) (06/20/22 0600)  Height: 6' 3" (190.5 cm) (06/14/22 1105)    Central Line    Date/Time: 6/20/2022 3:27 PM  Performed by: Rebel Sanderson MD  Authorized by: Rebel Sanderson MD     Location procedure was performed:  [facility]  Consent Done ?:  Yes  Time out complete?: Verified correct patient, procedure, equipment, staff, and site/side    Indications:  Med administration, hemodynamic monitoring and vascular access  Anesthesia:  Local infiltration  Local anesthetic:  Lidocaine 1% without epinephrine  Anesthetic total (ml):  3  Preparation:  Skin prepped with ChloraPrep  Location:  Left internal jugular  Catheter size:  7 Fr  Inserted Catheter Length (cm):  20  Ultrasound guidance: Yes    Vessel Caliber:  Medium   patent  Comprressibility:  Normal  Needle advanced into vessel with real time ultrasound guidance.    Guidewire confirmed in vessel.    Steril sheath on probe.    Sterile gel used.  Manometry: Yes    Number of attempts:  1  Securement:  Chlorhexidine patch, line sutured, sterile dressing applied and blood return through all ports  Complications: No    Estimated blood loss (mL):  5  Specimens: No    Implants: No    XRay:  Placement verified by x-ray, no pneumothorax on x-ray, tip termination and successful placement  Adverse Events:  None  Other Complications:  CVP approximately 14 on manometry   CVP approximately 14 on manometryTermination Site: superior vena cava      No flowsheet data found.  Please call with questions or concerns.    Rebel Sanderson MD  Heart Transplant PGY4  [facility]  6/20/2022  "

## 2022-06-20 NOTE — ASSESSMENT & PLAN NOTE
"-[facility]  -Approved for LVAD at [facility] on 4/2/22. Was not evaluated for OHTx as he quit smoking in April 2022  -Moved to ICU 6/13 in the setting of significant volume overloaded and low output state. IABP placed 6/13 and Epi and Marcella added for RV support  -Lasix gtt D/C"d 6/17 2/2 increase in sCr. Given 250 cc IV NS and sCr improved  -CVP: 11, SVO2:41, CO: 4.44, CI: 1.93, SVR: 571.  Repeat CVP on rounds was 19 so giving dose of IV Lasix this morning and will consider pm dose pending UOP and CVP  -IABP 1:1, IV milrinone 0.375, epi 0.02, and Marcella 10 ppm.   -TTE 6/14: LVEF 10%, G3DD, mild-moderate reduced RVSF, moderate-severe MR, moderate-sever tr, CVP 15, PASP 56, moderate PH, LVEDD 7.59  -RHC done 4/19/22 on Milrinone 0.5 mcg/kg/min: RA 11, PA 50/27 (35), W 27, CO/CI 3.7/1.7, PVR 2.2 and SVR 1535   -GDMT: Entresto and Aldactone on hold since admit 5/24-5/27 2/2 elevated sCr  -Reports dry weight is 217#, and that he had gained 6# on his home scale PTA  -Tentative VAD date 6/23. Unlikely OHTx candidate with smoking history (quit April 2022)         "

## 2022-06-20 NOTE — CARE UPDATE
BG goal 140-180    -A1C   Lab Results   Component Value Date    HGBA1C 9.2 (H) 05/20/2022         -HOME REGIMEN: Detemir  23  units daily and Aspart   12  units TIDWM and  Mod dose correction insulin    -INPATIENT REGIMEN: levemir 20 units daily and novolog 8 units with meals.     -GLUCOSE TREND FOR THE PAST 24HRS: 200s     -NO HYPOGYCEMIAS NOTED     -TOLERATING 100% OF PO DIET     -Diet: Diet diabetic [facility]; 2000 Calorie; Cardiac (Low Na/Chol); Isolation Tray - Regular [place]; Fluid - 1500mL        Remains in CICU, NAEON. Creatinin 1.5. BG above goal with scheduled insulin and prn correction scale. Tentative surgery 6/23/22      Plan:  Increase to levemir 24 units daily.   Increase to novolog 12 units with meals.   BG monitoring ac/hs and moderate dose correction scale.     Discharge planning:tbd     Endocrine to continue to follow    ** Please call Endocrine for any BG related issues **

## 2022-06-20 NOTE — PROGRESS NOTES
[facility] - Cardiac Intensive Care  Heart Transplant  Progress Note    Patient Name: eKvan Queen  MRN: [id number]  Admission Date: 6/13/2022  Hospital Length of Stay: 7 days  Attending Physician: Josh Pulido MD  Primary Care Provider: ORALIA Cline  Principal Problem:Acute on chronic combined systolic and diastolic heart failure, NYHA class 4    Subjective:     **Interval History: Patient with no new complaints this morning.  He had a bowel movement this am.  Appreciate endocrine assistance with hyperglycemia.  Patient is +672cc over the last 24 hours.  CVP: 11, SVO2: 41, CO: 4.44, CI: 1.93, SVR: 571.  Repeat CVP on rounds was 19 so Lasix 80mg IV ordered this morning.  Plan to monitor UOP and CVP and consider another dose this afternoon if needed.  Right IJ 6/13/22 to be replaced today.  PICC line 11/1/21 will be removed today as well.       Continuous Infusions:   sodium chloride 0.9% Stopped (06/18/22 0017)    EPINEPHrine 0.02 mcg/kg/min (06/20/22 1000)    heparin (porcine) in D5W 26 Units/kg/hr (06/20/22 1000)    milrinone 20mg/100ml D5W (200mcg/ml) 0.375 mcg/kg/min (06/20/22 1000)    nitric oxide gas       Scheduled Meds:   acetaminophen  650 mg Oral Q8H    aspirin  81 mg Oral Daily    busPIRone  7.5 mg Oral Q12H    furosemide (LASIX) injection  80 mg Intravenous Once    gabapentin  300 mg Oral TID    HYDROcodone-acetaminophen  1 tablet Oral Q8H    insulin aspart U-100  12 Units Subcutaneous TIDWM    [START ON 6/21/2022] insulin detemir U-100  24 Units Subcutaneous Daily    methocarbamoL  500 mg Oral QID    mirtazapine  15 mg Oral Nightly    pantoprazole  40 mg Oral Daily    polyethylene glycol  17 g Oral BID    potassium chloride  30 mEq Oral BID    senna-docusate 8.6-50 mg  1 tablet Oral Daily    sucralfate  1 g Oral Nightly     PRN Meds:bisacodyL, dextrose 10%, dextrose 10%, glucagon (human recombinant), glucose, glucose, insulin aspart U-100, magnesium oxide, magnesium  oxide, potassium bicarbonate, potassium bicarbonate, potassium bicarbonate, potassium, sodium phosphates, potassium, sodium phosphates, potassium, sodium phosphates, promethazine, sodium chloride 0.9%    Review of patient's allergies indicates:   Allergen Reactions    Bumex [bumetanide] Hives    Lactose Diarrhea     Other reaction(s): Abdominal distension, gaseous    Torsemide Hives     Objective:     Vital Signs (Most Recent):  Temp: 98.2 °F (36.8 °C) (06/20/22 0400)  Pulse: (!) 118 (06/20/22 1000)  Resp: 18 (06/20/22 1000)  BP: 125/86 (06/20/22 1000)  SpO2: 96 % (06/20/22 1000)   Vital Signs (24h Range):  Temp:  [98.2 °F (36.8 °C)-98.6 °F (37 °C)] 98.2 °F (36.8 °C)  Pulse:  [103-129] 118  Resp:  [11-37] 18  SpO2:  [92 %-99 %] 96 %  BP: (102-137)/(55-91) 125/86     Patient Vitals for the past 72 hrs (Last 3 readings):   Weight   06/20/22 0600 100.2 kg (220 lb 14.4 oz)   06/19/22 0300 95.2 kg (209 lb 14.1 oz)   06/18/22 0600 94.2 kg (207 lb 10.8 oz)       Body mass index is 27.61 kg/m².      Intake/Output Summary (Last 24 hours) at 6/20/2022 1023  Last data filed at 6/20/2022 1000  Gross per 24 hour   Intake 3280.93 ml   Output 2320 ml   Net 960.93 ml         Hemodynamic Parameters:       Telemetry: ST    Physical Exam  Constitutional:       Appearance: Normal appearance.   HENT:      Head: Normocephalic and atraumatic.   Eyes:      Conjunctiva/sclera: Conjunctivae normal.      Pupils: Pupils are equal, round, and reactive to light.   Neck:      Comments: RIJ TLC  Cardiovascular:      Rate and Rhythm: Regular rhythm. Tachycardia present.      Comments: +S3  Pulmonary:      Effort: Pulmonary effort is normal.      Breath sounds: Normal breath sounds.   Abdominal:      General: Bowel sounds are normal. There is distension.   Musculoskeletal:         General: No swelling. Normal range of motion.      Cervical back: Neck supple.   Skin:     General: Skin is warm and dry.      Capillary Refill: Capillary refill takes  2 to 3 seconds.   Neurological:      General: No focal deficit present.      Mental Status: He is alert and oriented to person, place, and time.   Psychiatric:         Mood and Affect: Mood normal.         Behavior: Behavior normal.         Thought Content: Thought content normal.         Judgment: Judgment normal.       Significant Labs:  CBC:  Recent Labs   Lab 06/18/22 0325 06/19/22  0411 06/20/22  0352   WBC 10.26 9.16 9.60   RBC 4.19* 3.75* 3.46*   HGB 12.5* 11.2* 10.5*   HCT 37.0* 33.7* 31.2*    328 300   MCV 88 90 90   MCH 29.8 29.9 30.3   MCHC 33.8 33.2 33.7       BNP:  Recent Labs   Lab 06/17/22  0332   *       CMP:  Recent Labs   Lab 06/18/22 0325 06/18/22  1543 06/19/22  0411 06/19/22  1511 06/20/22  0352   *   < > 248* 222* 265*   CALCIUM 10.3   < > 10.1 9.8 9.9   ALBUMIN 3.5  --  3.3*  --  3.2*   PROT 8.1  --  7.4  --  7.4   *   < > 132* 134* 133*   K 3.8   < > 4.0 4.1 4.5   CO2 36*   < > 29 27 29   CL 85*   < > 92* 95 97   BUN 40*   < > 34* 26* 24*   CREATININE 2.0*   < > 1.5* 1.4 1.5*   ALKPHOS 114  --  101  --  102   ALT 17  --  17  --  14   AST 27  --  23  --  22   BILITOT 0.9  --  0.6  --  0.6    < > = values in this interval not displayed.        Coagulation:   Recent Labs   Lab 06/13/22  2150 06/14/22  0549 06/19/22  1152 06/19/22 1959 06/20/22  0352   INR 1.2  --   --   --   --    APTT 26.8   < > 37.9* 50.1* 49.2*    < > = values in this interval not displayed.       LDH:  No results for input(s): LDH in the last 72 hours.  Microbiology:  Microbiology Results (last 7 days)       ** No results found for the last 168 hours. **            I have reviewed all pertinent labs within the past 24 hours.    Estimated Creatinine Clearance: 80.6 mL/min (A) (based on SCr of 1.5 mg/dL (H)).    Diagnostic Results:  I have reviewed and interpreted all pertinent imaging results/findings within the past 24 hours.    Assessment and Plan:     38 yo male with NIDCM with BiV systolic  "heart failure, on home Milrinone at 0.375 mcg/kg/min, presented at [facility] 4/2/22 ,was not evaluated for OHT as he has recently quit smoking in April 2022 but was approved for VAD with plan to begin OHT evaluation in upcoming months if Mr Queen is stable and suitable for OHT eval (blood group A), 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 Went to ED at [facility] 6/10 but left after waiting 4 hours. Had clinic appointment with us today, but arrived to [facility] early this morning and decided to go to the ED instead. Baseline Lasix dose is 80 mg bid. Reports taking 240 mg qd past 3 days with no improvement. BNP is 1701, up from 898 on 6/2 and 49 on 5/24. sCr is 1.8 with baseline ~ 1.5-1.7. sPO2 on RA is 93%. Wife at bedside    He has been given Lasix 80 mg IVP in the ED with plan to start Lasix gtt at 20 mg/hr           * Acute on chronic combined systolic and diastolic heart failure, NYHA class 4  -[facility]  -Approved for LVAD at [facility] on 4/2/22. Was not evaluated for OHTx as he quit smoking in April 2022  -Moved to ICU 6/13 in the setting of significant volume overloaded and low output state. IABP placed 6/13 and Epi and Marcella added for RV support  -Lasix gtt D/C"d 6/17 2/2 increase in sCr. Given 250 cc IV NS and sCr improved  -CVP: 11, SVO2:41, CO: 4.44, CI: 1.93, SVR: 571.  Repeat CVP on rounds was 19 so giving dose of IV Lasix this morning and will consider pm dose pending UOP and CVP  -IABP 1:1, IV milrinone 0.375, epi 0.02, and Marcella 10 ppm.   -TTE 6/14: LVEF 10%, G3DD, mild-moderate reduced RVSF, moderate-severe MR, moderate-sever tr, CVP 15, PASP 56, moderate PH, LVEDD 7.59  -RHC done " 4/19/22 on Milrinone 0.5 mcg/kg/min: RA 11, PA 50/27 (35), W 27, CO/CI 3.7/1.7, PVR 2.2 and SVR 1535   -GDMT: Entresto and Aldactone on hold since admit 5/24-5/27 2/2 elevated sCr  -Reports dry weight is 217#, and that he had gained 6# on his home scale PTA  -Tentative VAD date 6/23. Unlikely OHTx candidate with smoking history (quit April 2022)           Cardiogenic shock  -See A/C CHF    CKD (chronic kidney disease) stage 3, GFR 30-59 ml/min  -Admit sCr 1.8, baseline ~ 1.5-1.7  -creatinine elevated to 2.0 but improved to 1.5 today after fluid.    -Giving dose of IVP Lasix this morning  -Will monitor response    Muscle cramping  -decreased scheduled Percocet and starting Gabapentin    Uncontrolled diabetes mellitus  -Admitted 5/24-5/27 with hyperglycemia hyperosmolar syndrome  -Hgb A1C 9.2 on 5/20/22  -Glucose per labs today 265  -Endocrine consulted for better control    Uninterrupted Critical Care/Counseling Time (not including procedures): 60 minutes      DEYA Martinez  Heart Transplant  [facility] - Cardiac Int[facility]

## 2022-06-20 NOTE — PROGRESS NOTES
Patient seen prior to VAD implant tomorrow. Pt is lying in bed on IABP support, Niharika hendrix, at bedside, both AAO. Answered all questions to patient and caregiver's satisfaction. Reminded patient and caregiver that the home inspection checklist and community contact forms need to be completed and returned to me by tomorrow. Verbalized understanding.

## 2022-06-20 NOTE — PROGRESS NOTES
Update    Pt presents in room aaox4 with pleasant affect. No caregivers present at time of visit. Pt reports VAD surgery date is Thursday 6/23 and states his spouse and mother will be coming on Wednesday, 6/22. Pt aware that  reservation is made for 6/23. Pt reports coping well and denies further needs, questions, concerns at this time and none indicated. Providing ongoing psychosocial and counseling support, education, resources, assistance and discharge planning as indicated. Following and available.

## 2022-06-20 NOTE — SUBJECTIVE & OBJECTIVE
**Interval History: Patient with no new complaints this morning.  He had a bowel movement this am.  Appreciate endocrine assistance with hyperglycemia.  Patient is +672cc over the last 24 hours.  CVP: 11, SVO2: 41, CO: 4.44, CI: 1.93, SVR: 571.  Repeat CVP on rounds was 19 so Lasix 80mg IV ordered this morning.  Plan to monitor UOP and CVP and consider another dose this afternoon if needed.  Right IJ 6/13/22 to be replaced today.  PICC line 11/1/21 will be removed today as well.       Continuous Infusions:   sodium chloride 0.9% Stopped (06/18/22 0017)    EPINEPHrine 0.02 mcg/kg/min (06/20/22 1000)    heparin (porcine) in D5W 26 Units/kg/hr (06/20/22 1000)    milrinone 20mg/100ml D5W (200mcg/ml) 0.375 mcg/kg/min (06/20/22 1000)    nitric oxide gas       Scheduled Meds:   acetaminophen  650 mg Oral Q8H    aspirin  81 mg Oral Daily    busPIRone  7.5 mg Oral Q12H    furosemide (LASIX) injection  80 mg Intravenous Once    gabapentin  300 mg Oral TID    HYDROcodone-acetaminophen  1 tablet Oral Q8H    insulin aspart U-100  12 Units Subcutaneous TIDWM    [START ON 6/21/2022] insulin detemir U-100  24 Units Subcutaneous Daily    methocarbamoL  500 mg Oral QID    mirtazapine  15 mg Oral Nightly    pantoprazole  40 mg Oral Daily    polyethylene glycol  17 g Oral BID    potassium chloride  30 mEq Oral BID    senna-docusate 8.6-50 mg  1 tablet Oral Daily    sucralfate  1 g Oral Nightly     PRN Meds:bisacodyL, dextrose 10%, dextrose 10%, glucagon (human recombinant), glucose, glucose, insulin aspart U-100, magnesium oxide, magnesium oxide, potassium bicarbonate, potassium bicarbonate, potassium bicarbonate, potassium, sodium phosphates, potassium, sodium phosphates, potassium, sodium phosphates, promethazine, sodium chloride 0.9%    Review of patient's allergies indicates:   Allergen Reactions    Bumex [bumetanide] Hives    Lactose Diarrhea     Other reaction(s): Abdominal distension, gaseous    Torsemide Hives     Objective:      Vital Signs (Most Recent):  Temp: 98.2 °F (36.8 °C) (06/20/22 0400)  Pulse: (!) 118 (06/20/22 1000)  Resp: 18 (06/20/22 1000)  BP: 125/86 (06/20/22 1000)  SpO2: 96 % (06/20/22 1000)   Vital Signs (24h Range):  Temp:  [98.2 °F (36.8 °C)-98.6 °F (37 °C)] 98.2 °F (36.8 °C)  Pulse:  [103-129] 118  Resp:  [11-37] 18  SpO2:  [92 %-99 %] 96 %  BP: (102-137)/(55-91) 125/86     Patient Vitals for the past 72 hrs (Last 3 readings):   Weight   06/20/22 0600 100.2 kg (220 lb 14.4 oz)   06/19/22 0300 95.2 kg (209 lb 14.1 oz)   06/18/22 0600 94.2 kg (207 lb 10.8 oz)       Body mass index is 27.61 kg/m².      Intake/Output Summary (Last 24 hours) at 6/20/2022 1023  Last data filed at 6/20/2022 1000  Gross per 24 hour   Intake 3280.93 ml   Output 2320 ml   Net 960.93 ml         Hemodynamic Parameters:       Telemetry: ST    Physical Exam  Constitutional:       Appearance: Normal appearance.   HENT:      Head: Normocephalic and atraumatic.   Eyes:      Conjunctiva/sclera: Conjunctivae normal.      Pupils: Pupils are equal, round, and reactive to light.   Neck:      Comments: RIJ TLC  Cardiovascular:      Rate and Rhythm: Regular rhythm. Tachycardia present.      Comments: +S3  Pulmonary:      Effort: Pulmonary effort is normal.      Breath sounds: Normal breath sounds.   Abdominal:      General: Bowel sounds are normal. There is distension.   Musculoskeletal:         General: No swelling. Normal range of motion.      Cervical back: Neck supple.   Skin:     General: Skin is warm and dry.      Capillary Refill: Capillary refill takes 2 to 3 seconds.   Neurological:      General: No focal deficit present.      Mental Status: He is alert and oriented to person, place, and time.   Psychiatric:         Mood and Affect: Mood normal.         Behavior: Behavior normal.         Thought Content: Thought content normal.         Judgment: Judgment normal.       Significant Labs:  CBC:  Recent Labs   Lab 06/18/22  0325 06/19/22  2278  06/20/22  0352   WBC 10.26 9.16 9.60   RBC 4.19* 3.75* 3.46*   HGB 12.5* 11.2* 10.5*   HCT 37.0* 33.7* 31.2*    328 300   MCV 88 90 90   MCH 29.8 29.9 30.3   MCHC 33.8 33.2 33.7       BNP:  Recent Labs   Lab 06/17/22  0332   *       CMP:  Recent Labs   Lab 06/18/22  0325 06/18/22  1543 06/19/22  0411 06/19/22  1511 06/20/22  0352   *   < > 248* 222* 265*   CALCIUM 10.3   < > 10.1 9.8 9.9   ALBUMIN 3.5  --  3.3*  --  3.2*   PROT 8.1  --  7.4  --  7.4   *   < > 132* 134* 133*   K 3.8   < > 4.0 4.1 4.5   CO2 36*   < > 29 27 29   CL 85*   < > 92* 95 97   BUN 40*   < > 34* 26* 24*   CREATININE 2.0*   < > 1.5* 1.4 1.5*   ALKPHOS 114  --  101  --  102   ALT 17  --  17  --  14   AST 27  --  23  --  22   BILITOT 0.9  --  0.6  --  0.6    < > = values in this interval not displayed.        Coagulation:   Recent Labs   Lab 06/13/22  2150 06/14/22  0549 06/19/22  1152 06/19/22 1959 06/20/22  0352   INR 1.2  --   --   --   --    APTT 26.8   < > 37.9* 50.1* 49.2*    < > = values in this interval not displayed.       LDH:  No results for input(s): LDH in the last 72 hours.  Microbiology:  Microbiology Results (last 7 days)       ** No results found for the last 168 hours. **            I have reviewed all pertinent labs within the past 24 hours.    Estimated Creatinine Clearance: 80.6 mL/min (A) (based on SCr of 1.5 mg/dL (H)).    Diagnostic Results:  I have reviewed and interpreted all pertinent imaging results/findings within the past 24 hours.

## 2022-06-20 NOTE — ASSESSMENT & PLAN NOTE
-Admit sCr 1.8, baseline ~ 1.5-1.7  -creatinine elevated to 2.0 but improved to 1.5 today after fluid.    -Giving dose of IVP Lasix this morning  -Will monitor response

## 2022-06-21 PROBLEM — I42.8 NICM (NONISCHEMIC CARDIOMYOPATHY): Status: ACTIVE | Noted: 2020-10-26

## 2022-06-21 LAB
ALBUMIN SERPL BCP-MCNC: 3.3 G/DL (ref 3.5–5.2)
ALLENS TEST: ABNORMAL
ALP SERPL-CCNC: 106 U/L (ref 55–135)
ALT SERPL W/O P-5'-P-CCNC: 22 U/L (ref 10–44)
ANION GAP SERPL CALC-SCNC: 11 MMOL/L (ref 8–16)
ANION GAP SERPL CALC-SCNC: 11 MMOL/L (ref 8–16)
ANION GAP SERPL CALC-SCNC: 13 MMOL/L (ref 8–16)
APTT BLDCRRT: 46.9 SEC (ref 21–32)
APTT BLDCRRT: 54.7 SEC (ref 21–32)
APTT BLDCRRT: 59.1 SEC (ref 21–32)
ASCENDING AORTA: 2.56 CM
AST SERPL-CCNC: 26 U/L (ref 10–40)
AV INDEX (PROSTH): 0.5
AV MEAN GRADIENT: 2 MMHG
AV PEAK GRADIENT: 4 MMHG
AV VALVE AREA: 2.04 CM2
AV VELOCITY RATIO: 0.47
BASOPHILS # BLD AUTO: 0.05 K/UL (ref 0–0.2)
BASOPHILS NFR BLD: 0.4 % (ref 0–1.9)
BILIRUB SERPL-MCNC: 0.8 MG/DL (ref 0.1–1)
BSA FOR ECHO PROCEDURE: 2.27 M2
BUN SERPL-MCNC: 25 MG/DL (ref 6–20)
BUN SERPL-MCNC: 30 MG/DL (ref 6–20)
BUN SERPL-MCNC: 30 MG/DL (ref 6–20)
CALCIUM SERPL-MCNC: 9.4 MG/DL (ref 8.7–10.5)
CALCIUM SERPL-MCNC: 9.5 MG/DL (ref 8.7–10.5)
CALCIUM SERPL-MCNC: 9.7 MG/DL (ref 8.7–10.5)
CHLORIDE SERPL-SCNC: 90 MMOL/L (ref 95–110)
CHLORIDE SERPL-SCNC: 92 MMOL/L (ref 95–110)
CHLORIDE SERPL-SCNC: 94 MMOL/L (ref 95–110)
CO2 SERPL-SCNC: 29 MMOL/L (ref 23–29)
CREAT SERPL-MCNC: 1.5 MG/DL (ref 0.5–1.4)
CREAT SERPL-MCNC: 1.5 MG/DL (ref 0.5–1.4)
CREAT SERPL-MCNC: 1.7 MG/DL (ref 0.5–1.4)
CV ECHO LV RWT: 0.25 CM
DELSYS: ABNORMAL
DIFFERENTIAL METHOD: ABNORMAL
DOP CALC AO PEAK VEL: 0.95 M/S
DOP CALC AO VTI: 8.82 CM
DOP CALC LVOT AREA: 4.1 CM2
DOP CALC LVOT DIAMETER: 2.28 CM
DOP CALC LVOT PEAK VEL: 0.45 M/S
DOP CALC LVOT STROKE VOLUME: 18 CM3
DOP CALCLVOT PEAK VEL VTI: 4.41 CM
E WAVE DECELERATION TIME: 115.69 MSEC
E/A RATIO: 1.61
E/E' RATIO: 22.89 M/S
ECHO LV POSTERIOR WALL: 0.9 CM (ref 0.6–1.1)
EJECTION FRACTION: 15 %
EOSINOPHIL # BLD AUTO: 0.1 K/UL (ref 0–0.5)
EOSINOPHIL NFR BLD: 0.4 % (ref 0–8)
ERYTHROCYTE [DISTWIDTH] IN BLOOD BY AUTOMATED COUNT: 14.2 % (ref 11.5–14.5)
EST. GFR  (AFRICAN AMERICAN): 58.3 ML/MIN/1.73 M^2
EST. GFR  (AFRICAN AMERICAN): >60 ML/MIN/1.73 M^2
EST. GFR  (AFRICAN AMERICAN): >60 ML/MIN/1.73 M^2
EST. GFR  (NON AFRICAN AMERICAN): 50.4 ML/MIN/1.73 M^2
EST. GFR  (NON AFRICAN AMERICAN): 58.6 ML/MIN/1.73 M^2
EST. GFR  (NON AFRICAN AMERICAN): 58.6 ML/MIN/1.73 M^2
FRACTIONAL SHORTENING: 9 % (ref 28–44)
GLUCOSE SERPL-MCNC: 203 MG/DL (ref 70–110)
GLUCOSE SERPL-MCNC: 208 MG/DL (ref 70–110)
GLUCOSE SERPL-MCNC: 237 MG/DL (ref 70–110)
HCO3 UR-SCNC: 31.8 MMOL/L (ref 24–28)
HCO3 UR-SCNC: 32.8 MMOL/L (ref 24–28)
HCO3 UR-SCNC: 34 MMOL/L (ref 24–28)
HCT VFR BLD AUTO: 30.8 % (ref 40–54)
HGB BLD-MCNC: 10 G/DL (ref 14–18)
IMM GRANULOCYTES # BLD AUTO: 0.04 K/UL (ref 0–0.04)
IMM GRANULOCYTES NFR BLD AUTO: 0.4 % (ref 0–0.5)
INTERVENTRICULAR SEPTUM: 0.8 CM (ref 0.6–1.1)
LA MAJOR: 6.97 CM
LA MINOR: 7.43 CM
LA WIDTH: 5.35 CM
LACTATE SERPL-SCNC: 1.1 MMOL/L (ref 0.5–2.2)
LEFT ATRIUM SIZE: 4.52 CM
LEFT ATRIUM VOLUME INDEX MOD: 67 ML/M2
LEFT ATRIUM VOLUME INDEX: 65.4 ML/M2
LEFT ATRIUM VOLUME MOD: 151.43 CM3
LEFT ATRIUM VOLUME: 147.84 CM3
LEFT INTERNAL DIMENSION IN SYSTOLE: 6.53 CM (ref 2.1–4)
LEFT VENTRICLE DIASTOLIC VOLUME INDEX: 118.31 ML/M2
LEFT VENTRICLE DIASTOLIC VOLUME: 267.37 ML
LEFT VENTRICLE MASS INDEX: 121 G/M2
LEFT VENTRICLE SYSTOLIC VOLUME INDEX: 96.5 ML/M2
LEFT VENTRICLE SYSTOLIC VOLUME: 218 ML
LEFT VENTRICULAR INTERNAL DIMENSION IN DIASTOLE: 7.14 CM (ref 3.5–6)
LEFT VENTRICULAR MASS: 272.51 G
LV LATERAL E/E' RATIO: 25.75 M/S
LV SEPTAL E/E' RATIO: 20.6 M/S
LYMPHOCYTES # BLD AUTO: 2.2 K/UL (ref 1–4.8)
LYMPHOCYTES NFR BLD: 19.5 % (ref 18–48)
MAGNESIUM SERPL-MCNC: 1.9 MG/DL (ref 1.6–2.6)
MAGNESIUM SERPL-MCNC: 2 MG/DL (ref 1.6–2.6)
MCH RBC QN AUTO: 28.8 PG (ref 27–31)
MCHC RBC AUTO-ENTMCNC: 32.5 G/DL (ref 32–36)
MCV RBC AUTO: 89 FL (ref 82–98)
METHEMOGLOBIN: 0.7 % (ref 0–3)
MONOCYTES # BLD AUTO: 0.8 K/UL (ref 0.3–1)
MONOCYTES NFR BLD: 7.5 % (ref 4–15)
MV PEAK A VEL: 0.64 M/S
MV PEAK E VEL: 1.03 M/S
MV STENOSIS PRESSURE HALF TIME: 33.55 MS
MV VALVE AREA P 1/2 METHOD: 6.56 CM2
NEUTROPHILS # BLD AUTO: 8.1 K/UL (ref 1.8–7.7)
NEUTROPHILS NFR BLD: 71.8 % (ref 38–73)
NRBC BLD-RTO: 0 /100 WBC
PCO2 BLDA: 49.3 MMHG (ref 35–45)
PCO2 BLDA: 50.7 MMHG (ref 35–45)
PCO2 BLDA: 53.6 MMHG (ref 35–45)
PH SMN: 7.41 [PH] (ref 7.35–7.45)
PH SMN: 7.42 [PH] (ref 7.35–7.45)
PH SMN: 7.42 [PH] (ref 7.35–7.45)
PISA MRMAX VEL: 0.04 M/S
PISA TR MAX VEL: 3.45 M/S
PLATELET # BLD AUTO: 323 K/UL (ref 150–450)
PMV BLD AUTO: 9.9 FL (ref 9.2–12.9)
PO2 BLDA: 21 MMHG (ref 40–60)
PO2 BLDA: 22 MMHG (ref 40–60)
PO2 BLDA: 26 MMHG (ref 40–60)
POC BE: 7 MMOL/L
POC BE: 8 MMOL/L
POC BE: 9 MMOL/L
POC SATURATED O2: 34 % (ref 95–100)
POC SATURATED O2: 37 % (ref 95–100)
POC SATURATED O2: 46 % (ref 95–100)
POC TCO2: 33 MMOL/L (ref 24–29)
POC TCO2: 34 MMOL/L (ref 24–29)
POC TCO2: 36 MMOL/L (ref 24–29)
POCT GLUCOSE: 182 MG/DL (ref 70–110)
POCT GLUCOSE: 201 MG/DL (ref 70–110)
POCT GLUCOSE: 227 MG/DL (ref 70–110)
POCT GLUCOSE: 295 MG/DL (ref 70–110)
POTASSIUM SERPL-SCNC: 3.9 MMOL/L (ref 3.5–5.1)
POTASSIUM SERPL-SCNC: 4 MMOL/L (ref 3.5–5.1)
POTASSIUM SERPL-SCNC: 4.2 MMOL/L (ref 3.5–5.1)
PROT SERPL-MCNC: 7.7 G/DL (ref 6–8.4)
QEF: 13 %
RA MAJOR: 5.97 CM
RA PRESSURE: 15 MMHG
RA WIDTH: 4.31 CM
RBC # BLD AUTO: 3.47 M/UL (ref 4.6–6.2)
RIGHT VENTRICULAR END-DIASTOLIC DIMENSION: 4.71 CM
SAMPLE: ABNORMAL
SARS-COV-2 RDRP RESP QL NAA+PROBE: NEGATIVE
SINUS: 2.61 CM
SITE: ABNORMAL
SODIUM SERPL-SCNC: 132 MMOL/L (ref 136–145)
SODIUM SERPL-SCNC: 132 MMOL/L (ref 136–145)
SODIUM SERPL-SCNC: 134 MMOL/L (ref 136–145)
STJ: 2.39 CM
TDI LATERAL: 0.04 M/S
TDI SEPTAL: 0.05 M/S
TDI: 0.05 M/S
TR MAX PG: 48 MMHG
TRICUSPID ANNULAR PLANE SYSTOLIC EXCURSION: 1.29 CM
TV REST PULMONARY ARTERY PRESSURE: 63 MMHG
WBC # BLD AUTO: 11.25 K/UL (ref 3.9–12.7)

## 2022-06-21 PROCEDURE — 93005 ELECTROCARDIOGRAM TRACING: CPT | Mod: NTX

## 2022-06-21 PROCEDURE — 63600175 PHARM REV CODE 636 W HCPCS: Mod: NTX | Performed by: PHYSICIAN ASSISTANT

## 2022-06-21 PROCEDURE — 83605 ASSAY OF LACTIC ACID: CPT | Mod: NTX | Performed by: PHYSICIAN ASSISTANT

## 2022-06-21 PROCEDURE — 27000221 HC OXYGEN, UP TO 24 HOURS: Mod: NTX

## 2022-06-21 PROCEDURE — 27000202 HC IABP, ADD'L DAY: Mod: NTX

## 2022-06-21 PROCEDURE — 99291 CRITICAL CARE FIRST HOUR: CPT | Mod: NTX,,, | Performed by: PHYSICIAN ASSISTANT

## 2022-06-21 PROCEDURE — 99232 PR SUBSEQUENT HOSPITAL CARE,LEVL II: ICD-10-PCS | Mod: NTX,,, | Performed by: NURSE PRACTITIONER

## 2022-06-21 PROCEDURE — 99291 PR CRITICAL CARE, E/M 30-74 MINUTES: ICD-10-PCS | Mod: NTX,,, | Performed by: PHYSICIAN ASSISTANT

## 2022-06-21 PROCEDURE — 63600175 PHARM REV CODE 636 W HCPCS: Mod: NTX | Performed by: NURSE PRACTITIONER

## 2022-06-21 PROCEDURE — 25000003 PHARM REV CODE 250: Mod: NTX | Performed by: PHYSICIAN ASSISTANT

## 2022-06-21 PROCEDURE — 99232 SBSQ HOSP IP/OBS MODERATE 35: CPT | Mod: NTX,,, | Performed by: NURSE PRACTITIONER

## 2022-06-21 PROCEDURE — 83735 ASSAY OF MAGNESIUM: CPT | Mod: 91,NTX | Performed by: PHYSICIAN ASSISTANT

## 2022-06-21 PROCEDURE — 99292 PR CRITICAL CARE, ADDL 30 MIN: ICD-10-PCS | Mod: NTX,,, | Performed by: INTERNAL MEDICINE

## 2022-06-21 PROCEDURE — 93010 ELECTROCARDIOGRAM REPORT: CPT | Mod: NTX,,, | Performed by: INTERNAL MEDICINE

## 2022-06-21 PROCEDURE — 87186 SC STD MICRODIL/AGAR DIL: CPT | Mod: 59,NTX | Performed by: STUDENT IN AN ORGANIZED HEALTH CARE EDUCATION/TRAINING PROGRAM

## 2022-06-21 PROCEDURE — 25000003 PHARM REV CODE 250: Mod: NTX | Performed by: INTERNAL MEDICINE

## 2022-06-21 PROCEDURE — 87077 CULTURE AEROBIC IDENTIFY: CPT | Mod: NTX | Performed by: STUDENT IN AN ORGANIZED HEALTH CARE EDUCATION/TRAINING PROGRAM

## 2022-06-21 PROCEDURE — 25500020 PHARM REV CODE 255: Mod: NTX | Performed by: INTERNAL MEDICINE

## 2022-06-21 PROCEDURE — 63600367 HC NITRIC OXIDE PER HOUR: Mod: NTX

## 2022-06-21 PROCEDURE — 63600175 PHARM REV CODE 636 W HCPCS: Mod: NTX | Performed by: INTERNAL MEDICINE

## 2022-06-21 PROCEDURE — 99292 CRITICAL CARE ADDL 30 MIN: CPT | Mod: NTX,,, | Performed by: INTERNAL MEDICINE

## 2022-06-21 PROCEDURE — 83735 ASSAY OF MAGNESIUM: CPT | Mod: NTX | Performed by: INTERNAL MEDICINE

## 2022-06-21 PROCEDURE — 80048 BASIC METABOLIC PNL TOTAL CA: CPT | Mod: 91,NTX,XB | Performed by: PHYSICIAN ASSISTANT

## 2022-06-21 PROCEDURE — 87040 BLOOD CULTURE FOR BACTERIA: CPT | Mod: NTX | Performed by: STUDENT IN AN ORGANIZED HEALTH CARE EDUCATION/TRAINING PROGRAM

## 2022-06-21 PROCEDURE — 85025 COMPLETE CBC W/AUTO DIFF WBC: CPT | Mod: NTX | Performed by: INTERNAL MEDICINE

## 2022-06-21 PROCEDURE — 99900035 HC TECH TIME PER 15 MIN (STAT): Mod: NTX

## 2022-06-21 PROCEDURE — 94761 N-INVAS EAR/PLS OXIMETRY MLT: CPT | Mod: NTX

## 2022-06-21 PROCEDURE — 20000000 HC ICU ROOM: Mod: NTX

## 2022-06-21 PROCEDURE — 80053 COMPREHEN METABOLIC PANEL: CPT | Mod: NTX | Performed by: NURSE PRACTITIONER

## 2022-06-21 PROCEDURE — 85730 THROMBOPLASTIN TIME PARTIAL: CPT | Mod: 91,NTX | Performed by: INTERNAL MEDICINE

## 2022-06-21 PROCEDURE — 82803 BLOOD GASES ANY COMBINATION: CPT | Mod: NTX

## 2022-06-21 PROCEDURE — 25000003 PHARM REV CODE 250: Mod: NTX | Performed by: NURSE PRACTITIONER

## 2022-06-21 PROCEDURE — U0002 COVID-19 LAB TEST NON-CDC: HCPCS | Mod: NTX | Performed by: INTERNAL MEDICINE

## 2022-06-21 PROCEDURE — 63600175 PHARM REV CODE 636 W HCPCS: Mod: NTX | Performed by: STUDENT IN AN ORGANIZED HEALTH CARE EDUCATION/TRAINING PROGRAM

## 2022-06-21 PROCEDURE — 85730 THROMBOPLASTIN TIME PARTIAL: CPT | Mod: NTX | Performed by: INTERNAL MEDICINE

## 2022-06-21 PROCEDURE — 93010 EKG 12-LEAD: ICD-10-PCS | Mod: NTX,,, | Performed by: INTERNAL MEDICINE

## 2022-06-21 RX ORDER — MUPIROCIN 20 MG/G
1 OINTMENT TOPICAL 2 TIMES DAILY
Status: COMPLETED | OUTPATIENT
Start: 2022-06-21 | End: 2022-06-21

## 2022-06-21 RX ORDER — INSULIN ASPART 100 [IU]/ML
1-10 INJECTION, SOLUTION INTRAVENOUS; SUBCUTANEOUS
Status: DISCONTINUED | OUTPATIENT
Start: 2022-06-21 | End: 2022-06-21

## 2022-06-21 RX ORDER — CEFEPIME HYDROCHLORIDE 1 G/50ML
2 INJECTION, SOLUTION INTRAVENOUS
Status: DISPENSED | OUTPATIENT
Start: 2022-06-21 | End: 2022-06-21

## 2022-06-21 RX ORDER — LANOLIN ALCOHOL/MO/W.PET/CERES
400 CREAM (GRAM) TOPICAL 2 TIMES DAILY
Status: COMPLETED | OUTPATIENT
Start: 2022-06-21 | End: 2022-06-24

## 2022-06-21 RX ORDER — HYDROCODONE BITARTRATE AND ACETAMINOPHEN 500; 5 MG/1; MG/1
TABLET ORAL
Status: DISCONTINUED | OUTPATIENT
Start: 2022-06-21 | End: 2022-06-29

## 2022-06-21 RX ORDER — INSULIN ASPART 100 [IU]/ML
1-10 INJECTION, SOLUTION INTRAVENOUS; SUBCUTANEOUS EVERY 4 HOURS PRN
Status: DISCONTINUED | OUTPATIENT
Start: 2022-06-21 | End: 2022-06-24

## 2022-06-21 RX ORDER — ALPRAZOLAM 0.25 MG/1
0.25 TABLET ORAL ONCE
Status: COMPLETED | OUTPATIENT
Start: 2022-06-21 | End: 2022-06-22

## 2022-06-21 RX ORDER — FUROSEMIDE 10 MG/ML
120 INJECTION INTRAMUSCULAR; INTRAVENOUS ONCE
Status: COMPLETED | OUTPATIENT
Start: 2022-06-21 | End: 2022-06-21

## 2022-06-21 RX ORDER — CYCLOBENZAPRINE HCL 5 MG
5 TABLET ORAL ONCE
Status: COMPLETED | OUTPATIENT
Start: 2022-06-21 | End: 2022-06-21

## 2022-06-21 RX ORDER — MUPIROCIN 20 MG/G
OINTMENT TOPICAL
Status: DISCONTINUED | OUTPATIENT
Start: 2022-06-21 | End: 2022-06-29

## 2022-06-21 RX ORDER — GLUCAGON 1 MG
1 KIT INJECTION
Status: DISCONTINUED | OUTPATIENT
Start: 2022-06-21 | End: 2022-06-24

## 2022-06-21 RX ORDER — INSULIN ASPART 100 [IU]/ML
1-10 INJECTION, SOLUTION INTRAVENOUS; SUBCUTANEOUS EVERY 4 HOURS PRN
Status: DISCONTINUED | OUTPATIENT
Start: 2022-06-21 | End: 2022-06-21

## 2022-06-21 RX ORDER — FUROSEMIDE 10 MG/ML
80 INJECTION INTRAMUSCULAR; INTRAVENOUS ONCE
Status: COMPLETED | OUTPATIENT
Start: 2022-06-21 | End: 2022-06-21

## 2022-06-21 RX ADMIN — INSULIN ASPART 4 UNITS: 100 INJECTION, SOLUTION INTRAVENOUS; SUBCUTANEOUS at 10:06

## 2022-06-21 RX ADMIN — METHOCARBAMOL TABLETS 500 MG: 500 TABLET, COATED ORAL at 12:06

## 2022-06-21 RX ADMIN — HEPARIN SODIUM 24 UNITS/KG/HR: 5000 INJECTION INTRAVENOUS; SUBCUTANEOUS at 01:06

## 2022-06-21 RX ADMIN — METHOCARBAMOL TABLETS 500 MG: 500 TABLET, COATED ORAL at 09:06

## 2022-06-21 RX ADMIN — MUPIROCIN 1 G: 20 OINTMENT TOPICAL at 10:06

## 2022-06-21 RX ADMIN — HYDROCODONE BITARTRATE AND ACETAMINOPHEN 1 TABLET: 5; 325 TABLET ORAL at 07:06

## 2022-06-21 RX ADMIN — CYCLOBENZAPRINE HYDROCHLORIDE 5 MG: 5 TABLET, FILM COATED ORAL at 05:06

## 2022-06-21 RX ADMIN — INSULIN ASPART 2 UNITS: 100 INJECTION, SOLUTION INTRAVENOUS; SUBCUTANEOUS at 07:06

## 2022-06-21 RX ADMIN — POTASSIUM CHLORIDE 30 MEQ: 10 CAPSULE, COATED, EXTENDED RELEASE ORAL at 09:06

## 2022-06-21 RX ADMIN — PHYTONADIONE 10 MG: 10 INJECTION, EMULSION INTRAMUSCULAR; INTRAVENOUS; SUBCUTANEOUS at 12:06

## 2022-06-21 RX ADMIN — HYDROCODONE BITARTRATE AND ACETAMINOPHEN 1 TABLET: 5; 325 TABLET ORAL at 02:06

## 2022-06-21 RX ADMIN — SUCRALFATE 1 G: 1 TABLET ORAL at 09:06

## 2022-06-21 RX ADMIN — ACETAMINOPHEN 650 MG: 325 TABLET ORAL at 09:06

## 2022-06-21 RX ADMIN — EPINEPHRINE 0.02 MCG/KG/MIN: 1 INJECTION INTRAMUSCULAR; INTRAVENOUS; SUBCUTANEOUS at 06:06

## 2022-06-21 RX ADMIN — BUSPIRONE HYDROCHLORIDE 7.5 MG: 5 TABLET ORAL at 09:06

## 2022-06-21 RX ADMIN — ASPIRIN 81 MG CHEWABLE TABLET 81 MG: 81 TABLET CHEWABLE at 09:06

## 2022-06-21 RX ADMIN — ACETAMINOPHEN 650 MG: 325 TABLET ORAL at 07:06

## 2022-06-21 RX ADMIN — FUROSEMIDE 30 MG/HR: 10 INJECTION, SOLUTION INTRAMUSCULAR; INTRAVENOUS at 11:06

## 2022-06-21 RX ADMIN — HEPARIN SODIUM 26 UNITS/KG/HR: 5000 INJECTION INTRAVENOUS; SUBCUTANEOUS at 12:06

## 2022-06-21 RX ADMIN — GABAPENTIN 300 MG: 300 CAPSULE ORAL at 09:06

## 2022-06-21 RX ADMIN — MIRTAZAPINE 15 MG: 15 TABLET, FILM COATED ORAL at 09:06

## 2022-06-21 RX ADMIN — METHOCARBAMOL TABLETS 500 MG: 500 TABLET, COATED ORAL at 04:06

## 2022-06-21 RX ADMIN — MILRINONE LACTATE IN DEXTROSE 0.38 MCG/KG/MIN: 200 INJECTION, SOLUTION INTRAVENOUS at 07:06

## 2022-06-21 RX ADMIN — MUPIROCIN 1 G: 20 OINTMENT TOPICAL at 09:06

## 2022-06-21 RX ADMIN — HYDROCODONE BITARTRATE AND ACETAMINOPHEN 1 TABLET: 5; 325 TABLET ORAL at 09:06

## 2022-06-21 RX ADMIN — FUROSEMIDE 120 MG: 10 INJECTION, SOLUTION INTRAMUSCULAR; INTRAVENOUS at 09:06

## 2022-06-21 RX ADMIN — Medication 400 MG: at 09:06

## 2022-06-21 RX ADMIN — MILRINONE LACTATE IN DEXTROSE 0.38 MCG/KG/MIN: 200 INJECTION, SOLUTION INTRAVENOUS at 09:06

## 2022-06-21 RX ADMIN — GABAPENTIN 300 MG: 300 CAPSULE ORAL at 02:06

## 2022-06-21 RX ADMIN — FUROSEMIDE 80 MG: 10 INJECTION, SOLUTION INTRAMUSCULAR; INTRAVENOUS at 09:06

## 2022-06-21 RX ADMIN — FUROSEMIDE 40 MG/HR: 10 INJECTION, SOLUTION INTRAMUSCULAR; INTRAVENOUS at 10:06

## 2022-06-21 RX ADMIN — HUMAN ALBUMIN MICROSPHERES AND PERFLUTREN 0.66 MG: 10; .22 INJECTION, SOLUTION INTRAVENOUS at 10:06

## 2022-06-21 RX ADMIN — PANTOPRAZOLE SODIUM 40 MG: 40 TABLET, DELAYED RELEASE ORAL at 09:06

## 2022-06-21 RX ADMIN — INSULIN ASPART 6 UNITS: 100 INJECTION, SOLUTION INTRAVENOUS; SUBCUTANEOUS at 04:06

## 2022-06-21 RX ADMIN — INSULIN ASPART 2 UNITS: 100 INJECTION, SOLUTION INTRAVENOUS; SUBCUTANEOUS at 09:06

## 2022-06-21 NOTE — PROGRESS NOTES
"[facility] - Cardiac Intensive Care  Endocrinology  Progress Note    Admit Date: 6/13/2022     Reason for Consult: Management of  type 2 DM, Hyperglycemia     Surgical Procedure and Date: none    Diabetes diagnosis year: 4/21/21    Home Diabetes Medications:  Detemir  23  units daily and Aspart   12  units TIDWM and  Mod dose correction insulin    Lab Results   Component Value Date    HGBA1C 9.2 (H) 05/20/2022           How often checking glucose at home? 1-3 x day   BG readings on regimen: 180- up to 300 once  Hypoglycemia on the regimen?  yes once- related to not really eating after taking aspart   Missed doses on regimen?  no    Diabetes Complications include:     Hyperglycemia    Complicating diabetes co morbidities:   History of MI, CHF, and CKD      HPI:   Patient is a 37 y.o. male with a diagnosis of type 2 DM and NIDCM with BiV systolic heart failure. Patient was presented at [facility] 4/2/22  and was  approved for VAD. Also recently admitted with [facility]; he had clinic appointment last week to f/u recent admit for hyperglycemic hyperosmolar syndrome but did not come as he was "feeling too bad" presents to  ED with SOB. Endocrinology consulted for BG/ DM management.                Interval HPI:   Overnight events: No acute events overnight. Patient on the CICU in room [id number] A. Blood glucose stable. BG at and above goal on current insulin regimen (SSI, prandial, and basal insulin ). Steroid use- None. Day of Surgery  Renal function- Abnormal - Creatinine 1.5   Vasopressors-  Epinephrine       Diet NPO  Diet NPO     Eating:   NPO  Nausea: No  Hypoglycemia and intervention: No  Fever: No  TPN and/or TF: No    BP (!) 136/98   Pulse (!) 122   Temp 98.6 °F (37 °C) (Oral)   Resp (!) 32   Ht 6' 3" (1.905 m)   Wt 97.5 kg (215 lb)   SpO2 (!) 94%   BMI 26.87 kg/m²     Labs Reviewed and Include    Recent Labs   Lab 06/21/22  0418   *   CALCIUM 9.7   ALBUMIN 3.3*   PROT 7.7   *   K 3.9   CO2 29 "   CL 94*   BUN 25*   CREATININE 1.5*   ALKPHOS 106   ALT 22   AST 26   BILITOT 0.8     Lab Results   Component Value Date    WBC 11.25 06/21/2022    HGB 10.0 (L) 06/21/2022    HCT 30.8 (L) 06/21/2022    MCV 89 06/21/2022     06/21/2022     No results for input(s): TSH, FREET4 in the last 168 hours.  Lab Results   Component Value Date    HGBA1C 9.2 (H) 05/20/2022       Nutritional status:   Body mass index is 26.87 kg/m².  Lab Results   Component Value Date    ALBUMIN 3.3 (L) 06/21/2022    ALBUMIN 3.2 (L) 06/20/2022    ALBUMIN 3.3 (L) 06/19/2022     Lab Results   Component Value Date    PREALBUMIN 36 04/18/2022       Estimated Creatinine Clearance: 80.6 mL/min (A) (based on SCr of 1.5 mg/dL (H)).    Accu-Checks  Recent Labs     06/18/22  1649 06/18/22  2159 06/19/22  1150 06/19/22  1721 06/19/22  2231 06/20/22  0816 06/20/22  1104 06/20/22  1702 06/20/22  2144 06/21/22  0744   POCTGLUCOSE 221* 277* 259* 251* 285* 260* 232* 166* 259* 227*       Current Medications and/or Treatments Impacting Glycemic Control  Immunotherapy:    Immunosuppressants       None          Steroids:   Hormones (From admission, onward)                None          Pressors:    Autonomic Drugs (From admission, onward)                Start     Stop Route Frequency Ordered    06/13/22 2130  EPINEPHrine (ADRENALIN) 5 mg in dextrose 5 % 250 mL infusion         -- IV Continuous 06/13/22 2024          Hyperglycemia/Diabetes Medications:   Antihyperglycemics (From admission, onward)                Start     Stop Route Frequency Ordered    06/21/22 1005  insulin aspart U-100 pen 1-10 Units         -- SubQ Every 4 hours PRN 06/21/22 0905    06/21/22 0900  insulin detemir U-100 pen 24 Units         -- SubQ Daily 06/20/22 0943    06/20/22 1130  insulin aspart U-100 pen 12 Units         -- SubQ 3 times daily with meals 06/20/22 9127            ASSESSMENT and PLAN    * Acute on chronic combined systolic and diastolic heart failure, NYHA class  4  Optimize glucose control and  avoid hypoglycemia    Managed per primary.       Uncontrolled diabetes mellitus  Endocrinology consulted for BG management.   BG goal 140-180    - Levemir Flex Pen 20 units daily  - Novolog (aspart) insulin prn for BG excursions [facility] SSI (150/25). (Changed from [facility] to [facility] due to BG excursions. Patient NPO for LVAD).   - BG checks q4hr  - Hypoglycemia protocol in place    - Likely will require IIP s/p LVAD      ** Please notify Endocrine for any change and/or advance in diet**  ** Please call Endocrine for any BG related issues **    Discharge Planning:   TBD. Please notify endocrinology prior to discharge.        CKD (chronic kidney disease) stage 3, GFR 30-59 ml/min    Titrate insulin slowly to avoid hypoglycemia as the risk of hypoglycemia increases with decreased creatinine clearance.    Estimated Creatinine Clearance: 80.6 mL/min (A) (based on SCr of 1.5 mg/dL (H)).           Michael Wyman, DNP, FNP  Endocrinology  [facility] - Cardiac Intensive Care

## 2022-06-21 NOTE — PROGRESS NOTES
Interdisciplinary Rounds Report:   Attended interdisciplinary rounds with the [facility]/CTS services including the LVAD Coordinators, social workers, cardiologists, surgeons,  PT/OT/Speech, dietician, and unit charge nurses. Discussed patient status, plan of care, goals of care, including DC date, and post discharge needs. Plan of care will be discussed with the patient and/or family per the physician while rounding on the floor. This is a recurring meeting that is medically and socially necessary to collaborate with the interdisciplinary team to assist patient needs and safe discharge.

## 2022-06-21 NOTE — PROGRESS NOTES
Patient seen prior to VAD placement today. No questions at this time. Home inspection checklist and community contact forms completed and reviewed. No further needs at this time.   Complex Repair And W Plasty Text: The defect edges were debeveled with a #15 scalpel blade.  The primary defect was closed partially with a complex linear closure.  Given the location of the remaining defect, shape of the defect and the proximity to free margins a W plasty was deemed most appropriate for complete closure of the defect.  Using a sterile surgical marker, an appropriate advancement flap was drawn incorporating the defect and placing the expected incisions within the relaxed skin tension lines where possible.    The area thus outlined was incised deep to adipose tissue with a #15 scalpel blade.  The skin margins were undermined to an appropriate distance in all directions utilizing iris scissors.

## 2022-06-21 NOTE — RESEARCH
Study Title:  Antiplatelet Removal and HemocompatIbility EventS with the HeartMate 3 Pump   Protocol: [id number]  IRB #/Approval Date: [id number]  Sponsor: [employer]  : Dr. Luis F Paige   Others present: wife    Mr. Thacker was met in CICU room 3088 and confirmed identity by  2 unique patient identifiers. I asked if there were any preliminary questions.   The Informed Consent was presented in English and reviewed with the subject page by page. The patient verbalized understanding.   The potential subject and I discussed the trial in full detail, including but not limited to: the purpose of the study and the inclusions for participation, the voluntary nature of participation, the study procedures and expectations, the potential adverse events, the risks and benefits, the alternatives if subject decides not to participate, our obligation of protection of privacy and the nature of sharing information with outside agencies.  The subject was given ample time to review the IC and ask questions  I answered the following questions: How will his safety monitored and The purpose of the research were answered to wife and subject's satisfaction  The subject willingly and freely signed the Informed Consent and copy of  fully executed document is attached to this admission and sent to subject's home  My contact information was given to patient.  Only after these items were completed did subject participate in any research trial activities.

## 2022-06-21 NOTE — CARE UPDATE
Hemodynamics Care Update Note    SVO2: 46  CVP: 16  CO: 5.08   CI: 2.28   SVR: 1163   I: 1.69 O: 3.8   (calculated using oxygen consumption constant of 3.5)       Plan:  Pt with good UOP after Lasix 80 IV push. Will continue to monitor CVP and UOP     Case discussed with on call HTS attending.    Minor Silva, PGY-4  Cardiovascular Disease  [facility]

## 2022-06-21 NOTE — PROGRESS NOTES
06/21/2022  Casey Arizmendi    Current provider:  Luca Lopez Jr.,*    Device interrogation:  No flowsheet data found.       Rounded on Kevan Queen to ensure all mechanical assist device settings (IABP or VAD) were appropriate and all parameters were within limits.  I was able to ensure all back up equipment was present, the staff had no issues, and the Perfusion Department daily rounding was complete.      For implantable VADs: Interrogation of Ventricular assist device was performed with analysis of device parameters and review of device function. I have personally reviewed the interrogation findings and agree with findings as stated.     In emergency, the nursing units have been notified to contact the perfusion department either by:  Calling [phone] from 630am to 4pm Mon thru Fri, utilizing the On-Call Finder functionality of Epic and searching for Perfusion, or by contacting the hospital  from 4pm to 630am and on weekends and asking to speak with the perfusionist on call.    9:25 AM

## 2022-06-21 NOTE — ASSESSMENT & PLAN NOTE
-Admit sCr 1.8, baseline ~ 1.5-1.7  -creatinine elevated to 2.0 but improved after fluid.    -creatinine has been stable and is 1.5 today  -Will monitor response

## 2022-06-21 NOTE — SUBJECTIVE & OBJECTIVE
**Interval History: Right IJ removed and changed to L IJ yesterday.  PICC line removed. Patient given 2 doses of 80mg IV Lasix yesterday with good response.  He is net negative 2.3L in the last 24 hours.  This morning: CVP: 12, SVO2: 34, CO :4.02, CI: 1.77, SVR: 1631.  On rounds; repeat CVP was 18 so gave 120mg IVP Lasix followed by continuous infusion at 40mg/hr.  Plan for LVAD placement today.     Lines:  IABP 6/16  Left IJ: 6/20    Continuous Infusions:   sodium chloride 0.9% Stopped (06/18/22 0017)    EPINEPHrine 0.02 mcg/kg/min (06/21/22 1100)    furosemide (LASIX) 10 mg/mL infusion (non-titrating) 40 mg/hr (06/21/22 1100)    heparin (porcine) in D5W 24 Units/kg/hr (06/21/22 1100)    milrinone 20mg/100ml D5W (200mcg/ml) 0.375 mcg/kg/min (06/21/22 1100)    nitric oxide gas       Scheduled Meds:   acetaminophen  650 mg Oral Q8H    aspirin  81 mg Oral Daily    busPIRone  7.5 mg Oral Q12H    ceFEPime (MAXIPIME) IVPB  2 g Intravenous Once Pre-Op    gabapentin  300 mg Oral TID    HYDROcodone-acetaminophen  1 tablet Oral Q8H    insulin aspart U-100  12 Units Subcutaneous TIDWM    insulin detemir U-100  24 Units Subcutaneous Daily    methocarbamoL  500 mg Oral QID    mirtazapine  15 mg Oral Nightly    mupirocin  1 g Nasal BID    pantoprazole  40 mg Oral Daily    polyethylene glycol  17 g Oral BID    potassium chloride  30 mEq Oral BID    senna-docusate 8.6-50 mg  1 tablet Oral Daily    sucralfate  1 g Oral Nightly    vancomycin (VANCOCIN) IVPB  1,250 mg Intravenous Once Pre-Op     PRN Meds:sodium chloride, sodium chloride, sodium chloride, bisacodyL, dextrose 10%, dextrose 10%, glucagon (human recombinant), glucose, glucose, insulin aspart U-100, magnesium oxide, magnesium oxide, mupirocin, potassium bicarbonate, potassium bicarbonate, potassium bicarbonate, potassium, sodium phosphates, potassium, sodium phosphates, potassium, sodium phosphates, promethazine, sodium chloride 0.9%    Review of patient's allergies  indicates:   Allergen Reactions    Bumex [bumetanide] Hives    Lactose Diarrhea     Other reaction(s): Abdominal distension, gaseous    Torsemide Hives     Objective:     Vital Signs (Most Recent):  Temp: 98.3 °F (36.8 °C) (06/21/22 1100)  Pulse: (!) 125 (06/21/22 1100)  Resp: (!) 43 (06/21/22 1100)  BP: 137/79 (06/21/22 1100)  SpO2: 99 % (06/21/22 1100)   Vital Signs (24h Range):  Temp:  [97.2 °F (36.2 °C)-98.8 °F (37.1 °C)] 98.3 °F (36.8 °C)  Pulse:  [105-126] 125  Resp:  [13-43] 43  SpO2:  [90 %-100 %] 99 %  BP: (114-145)/(56-98) 137/79     Patient Vitals for the past 72 hrs (Last 3 readings):   Weight   06/21/22 0900 97.5 kg (215 lb)   06/21/22 0612 97.7 kg (215 lb 6.2 oz)   06/20/22 0600 100.2 kg (220 lb 14.4 oz)       Body mass index is 26.87 kg/m².      Intake/Output Summary (Last 24 hours) at 6/21/2022 1134  Last data filed at 6/21/2022 1100  Gross per 24 hour   Intake 2096.73 ml   Output 4420 ml   Net -2323.27 ml         Hemodynamic Parameters:       Telemetry: ST    Physical Exam  Constitutional:       Appearance: Normal appearance.   HENT:      Head: Normocephalic and atraumatic.   Eyes:      Conjunctiva/sclera: Conjunctivae normal.      Pupils: Pupils are equal, round, and reactive to light.   Neck:      Comments: RIJ TLC  Cardiovascular:      Rate and Rhythm: Regular rhythm. Tachycardia present.      Comments: +S3  Pulmonary:      Effort: Pulmonary effort is normal.      Breath sounds: Normal breath sounds.   Abdominal:      General: Bowel sounds are normal. There is distension.   Musculoskeletal:         General: No swelling. Normal range of motion.      Cervical back: Neck supple.   Skin:     General: Skin is warm and dry.      Capillary Refill: Capillary refill takes 2 to 3 seconds.   Neurological:      General: No focal deficit present.      Mental Status: He is alert and oriented to person, place, and time.   Psychiatric:         Mood and Affect: Mood normal.         Behavior: Behavior normal.          Thought Content: Thought content normal.         Judgment: Judgment normal.       Significant Labs:  CBC:  Recent Labs   Lab 06/19/22 0411 06/20/22  0352 06/21/22 0418   WBC 9.16 9.60 11.25   RBC 3.75* 3.46* 3.47*   HGB 11.2* 10.5* 10.0*   HCT 33.7* 31.2* 30.8*    300 323   MCV 90 90 89   MCH 29.9 30.3 28.8   MCHC 33.2 33.7 32.5       BNP:  Recent Labs   Lab 06/17/22  0332   *       CMP:  Recent Labs   Lab 06/19/22  0411 06/19/22  1511 06/20/22  0352 06/20/22  1428 06/21/22 0418   *   < > 265* 169* 208*   CALCIUM 10.1   < > 9.9 10.2 9.7   ALBUMIN 3.3*  --  3.2*  --  3.3*   PROT 7.4  --  7.4  --  7.7   *   < > 133* 134* 134*   K 4.0   < > 4.5 3.9 3.9   CO2 29   < > 29 30* 29   CL 92*   < > 97 95 94*   BUN 34*   < > 24* 23* 25*   CREATININE 1.5*   < > 1.5* 1.3 1.5*   ALKPHOS 101  --  102  --  106   ALT 17  --  14  --  22   AST 23  --  22  --  26   BILITOT 0.6  --  0.6  --  0.8    < > = values in this interval not displayed.        Coagulation:   Recent Labs   Lab 06/19/22 1959 06/20/22 0352 06/20/22  1607 06/21/22 0418   INR  --   --  1.0  --    APTT 50.1* 49.2*  --  59.1*       LDH:  No results for input(s): LDH in the last 72 hours.  Microbiology:  Microbiology Results (last 7 days)       Procedure Component Value Units Date/Time    Blood culture [[id number]] Collected: 06/20/22 1651    Order Status: Completed Specimen: Blood from Peripheral, Hand, Left Updated: 06/21/22 0115     Blood Culture, Routine No Growth to date            I have reviewed all pertinent labs within the past 24 hours.    Estimated Creatinine Clearance: 80.6 mL/min (A) (based on SCr of 1.5 mg/dL (H)).    Diagnostic Results:  I have reviewed and interpreted all pertinent imaging results/findings within the past 24 hours.

## 2022-06-21 NOTE — CARE UPDATE
"Transplant Update Note    Went to bedside immediately to evaluate patient "right chest pain". Patient in significant distress from cramping. CXR ordered to evaluate IABP placement, metallic tip at level of maynor, appropriate level. Radiology comments reviewed. Lytes reviewed, magnesium at 1.9, replete with Mg Ox 400 mg PO BID x 6 doses. Patient relieved with cyclobenzaprine 5 mg PO x 1.     No other changes at this time.     Please call with questions or concerns.     Rebel Sanderson MD  Heart Transplant PGY4  [facility]  "

## 2022-06-21 NOTE — PROGRESS NOTES
VAD surgery has been rescheduled for Thursday 6/23. Pt's mother requesting assistance with a place to stay until surgery. States she does not have enough money to pay for gas to drive to [place] and come back for the surgery. Discussed with SW, provided with list of local hotels. Family is not able to afford this. Discussed w/ charge nurse possibility of both fiance and mother staying in patients room.

## 2022-06-21 NOTE — PROGRESS NOTES
[facility] - Cardiac Intensive Care  Heart Transplant  Progress Note    Patient Name: Kevan Queen  MRN: [id number]  Admission Date: 6/13/2022  Hospital Length of Stay: 8 days  Attending Physician: Luca Lopez Jr.,*  Primary Care Provider: ORALIA Cline  Principal Problem:Acute on chronic combined systolic and diastolic heart failure, NYHA class 4    Subjective:     **Interval History: Right IJ removed and changed to L IJ yesterday.  PICC line removed. Patient given 2 doses of 80mg IV Lasix yesterday with good response.  He is net negative 2.3L in the last 24 hours.  This morning: CVP: 12, SVO2: 34, CO :4.02, CI: 1.77, SVR: 1631.  On rounds; repeat CVP was 18 so gave 120mg IVP Lasix followed by continuous infusion at 40mg/hr.  Plan for LVAD placement today.     Lines:  IABP 6/16  Left IJ: 6/20    Continuous Infusions:   sodium chloride 0.9% Stopped (06/18/22 0017)    EPINEPHrine 0.02 mcg/kg/min (06/21/22 1100)    furosemide (LASIX) 10 mg/mL infusion (non-titrating) 40 mg/hr (06/21/22 1100)    heparin (porcine) in D5W 24 Units/kg/hr (06/21/22 1100)    milrinone 20mg/100ml D5W (200mcg/ml) 0.375 mcg/kg/min (06/21/22 1100)    nitric oxide gas       Scheduled Meds:   acetaminophen  650 mg Oral Q8H    aspirin  81 mg Oral Daily    busPIRone  7.5 mg Oral Q12H    ceFEPime (MAXIPIME) IVPB  2 g Intravenous Once Pre-Op    gabapentin  300 mg Oral TID    HYDROcodone-acetaminophen  1 tablet Oral Q8H    insulin aspart U-100  12 Units Subcutaneous TIDWM    insulin detemir U-100  24 Units Subcutaneous Daily    methocarbamoL  500 mg Oral QID    mirtazapine  15 mg Oral Nightly    mupirocin  1 g Nasal BID    pantoprazole  40 mg Oral Daily    polyethylene glycol  17 g Oral BID    potassium chloride  30 mEq Oral BID    senna-docusate 8.6-50 mg  1 tablet Oral Daily    sucralfate  1 g Oral Nightly    vancomycin (VANCOCIN) IVPB  1,250 mg Intravenous Once Pre-Op     PRN Meds:sodium chloride, sodium chloride,  sodium chloride, bisacodyL, dextrose 10%, dextrose 10%, glucagon (human recombinant), glucose, glucose, insulin aspart U-100, magnesium oxide, magnesium oxide, mupirocin, potassium bicarbonate, potassium bicarbonate, potassium bicarbonate, potassium, sodium phosphates, potassium, sodium phosphates, potassium, sodium phosphates, promethazine, sodium chloride 0.9%    Review of patient's allergies indicates:   Allergen Reactions    Bumex [bumetanide] Hives    Lactose Diarrhea     Other reaction(s): Abdominal distension, gaseous    Torsemide Hives     Objective:     Vital Signs (Most Recent):  Temp: 98.3 °F (36.8 °C) (06/21/22 1100)  Pulse: (!) 125 (06/21/22 1100)  Resp: (!) 43 (06/21/22 1100)  BP: 137/79 (06/21/22 1100)  SpO2: 99 % (06/21/22 1100)   Vital Signs (24h Range):  Temp:  [97.2 °F (36.2 °C)-98.8 °F (37.1 °C)] 98.3 °F (36.8 °C)  Pulse:  [105-126] 125  Resp:  [13-43] 43  SpO2:  [90 %-100 %] 99 %  BP: (114-145)/(56-98) 137/79     Patient Vitals for the past 72 hrs (Last 3 readings):   Weight   06/21/22 0900 97.5 kg (215 lb)   06/21/22 0612 97.7 kg (215 lb 6.2 oz)   06/20/22 0600 100.2 kg (220 lb 14.4 oz)       Body mass index is 26.87 kg/m².      Intake/Output Summary (Last 24 hours) at 6/21/2022 1134  Last data filed at 6/21/2022 1100  Gross per 24 hour   Intake 2096.73 ml   Output 4420 ml   Net -2323.27 ml         Hemodynamic Parameters:       Telemetry: ST    Physical Exam  Constitutional:       Appearance: Normal appearance.   HENT:      Head: Normocephalic and atraumatic.   Eyes:      Conjunctiva/sclera: Conjunctivae normal.      Pupils: Pupils are equal, round, and reactive to light.   Neck:      Comments: RIJ TLC  Cardiovascular:      Rate and Rhythm: Regular rhythm. Tachycardia present.      Comments: +S3  Pulmonary:      Effort: Pulmonary effort is normal.      Breath sounds: Normal breath sounds.   Abdominal:      General: Bowel sounds are normal. There is distension.   Musculoskeletal:          General: No swelling. Normal range of motion.      Cervical back: Neck supple.   Skin:     General: Skin is warm and dry.      Capillary Refill: Capillary refill takes 2 to 3 seconds.   Neurological:      General: No focal deficit present.      Mental Status: He is alert and oriented to person, place, and time.   Psychiatric:         Mood and Affect: Mood normal.         Behavior: Behavior normal.         Thought Content: Thought content normal.         Judgment: Judgment normal.       Significant Labs:  CBC:  Recent Labs   Lab 06/19/22 0411 06/20/22 0352 06/21/22 0418   WBC 9.16 9.60 11.25   RBC 3.75* 3.46* 3.47*   HGB 11.2* 10.5* 10.0*   HCT 33.7* 31.2* 30.8*    300 323   MCV 90 90 89   MCH 29.9 30.3 28.8   MCHC 33.2 33.7 32.5       BNP:  Recent Labs   Lab 06/17/22  0332   *       CMP:  Recent Labs   Lab 06/19/22 0411 06/19/22  1511 06/20/22 0352 06/20/22  1428 06/21/22 0418   *   < > 265* 169* 208*   CALCIUM 10.1   < > 9.9 10.2 9.7   ALBUMIN 3.3*  --  3.2*  --  3.3*   PROT 7.4  --  7.4  --  7.7   *   < > 133* 134* 134*   K 4.0   < > 4.5 3.9 3.9   CO2 29   < > 29 30* 29   CL 92*   < > 97 95 94*   BUN 34*   < > 24* 23* 25*   CREATININE 1.5*   < > 1.5* 1.3 1.5*   ALKPHOS 101  --  102  --  106   ALT 17  --  14  --  22   AST 23  --  22  --  26   BILITOT 0.6  --  0.6  --  0.8    < > = values in this interval not displayed.        Coagulation:   Recent Labs   Lab 06/19/22 1959 06/20/22 0352 06/20/22  1607 06/21/22 0418   INR  --   --  1.0  --    APTT 50.1* 49.2*  --  59.1*       LDH:  No results for input(s): LDH in the last 72 hours.  Microbiology:  Microbiology Results (last 7 days)       Procedure Component Value Units Date/Time    Blood culture [[id number]] Collected: 06/20/22 1651    Order Status: Completed Specimen: Blood from Peripheral, Hand, Left Updated: 06/21/22 0115     Blood Culture, Routine No Growth to date            I have reviewed all pertinent labs within the past 24  "hours.    Estimated Creatinine Clearance: 80.6 mL/min (A) (based on SCr of 1.5 mg/dL (H)).    Diagnostic Results:  I have reviewed and interpreted all pertinent imaging results/findings within the past 24 hours.    Assessment and Plan:     36 yo male with NIDCM with BiV systolic heart failure, on home Milrinone at 0.375 mcg/kg/min, presented at [facility] 4/2/22 ,was not evaluated for OHT as he has recently quit smoking in April 2022 but was approved for VAD with plan to begin OHT evaluation in upcoming months if Mr Queen is stable and suitable for OHT eval (blood group A), 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 Went to ED at [facility] 6/10 but left after waiting 4 hours. Had clinic appointment with us today, but arrived to [facility] early this morning and decided to go to the ED instead. Baseline Lasix dose is 80 mg bid. Reports taking 240 mg qd past 3 days with no improvement. BNP is 1701, up from 898 on 6/2 and 49 on 5/24. sCr is 1.8 with baseline ~ 1.5-1.7. sPO2 on RA is 93%. Wife at bedside    He has been given Lasix 80 mg IVP in the ED with plan to start Lasix gtt at 20 mg/hr           * Acute on chronic combined systolic and diastolic heart failure, NYHA class 4  -[facility]  -Approved for LVAD at [facility] on 4/2/22. Was not evaluated for OHTx as he quit smoking in April 2022  -Moved to ICU 6/13 in the setting of significant volume overloaded and low output state. IABP placed 6/13 and Epi and Marcella added for RV support  -Lasix gtt D/C"d 6/17 2/2 increase in sCr. Given 250 cc IV NS and sCr improved  -CVP: 12, SVO2:34, CO: 4.02, CI: 1.77, SVR:1631.  Repeat CVP on rounds was 18 so gave 120mg IVP " of lasix and restarted continuous infusion at 40mg/hr   -IABP 1:1, IV milrinone 0.375, epi 0.02, and Marcella 10 ppm.   -TTE 6/14: LVEF 10%, G3DD, mild-moderate reduced RVSF, moderate-severe MR, moderate-sever tr, CVP 15, PASP 56, moderate PH, LVEDD 7.59  -RHC done 4/19/22 on Milrinone 0.5 mcg/kg/min: RA 11, PA 50/27 (35), W 27, CO/CI 3.7/1.7, PVR 2.2 and SVR 1535   -GDMT: Entresto and Aldactone on hold since admit 5/24-5/27 2/2 elevated sCr  -Reports dry weight is 217#, and that he had gained 6# on his home scale PTA  - VAD implant today. Unlikely OHTx candidate with smoking history (quit April 2022)           Cardiogenic shock  -See A/C CHF    CKD (chronic kidney disease) stage 3, GFR 30-59 ml/min  -Admit sCr 1.8, baseline ~ 1.5-1.7  -creatinine elevated to 2.0 but improved after fluid.    -creatinine has been stable and is 1.5 today  -Will monitor response    Muscle cramping  -decreased scheduled Percocet and starting Gabapentin    Uncontrolled diabetes mellitus  -Admitted 5/24-5/27 with hyperglycemia hyperosmolar syndrome  -Hgb A1C 9.2 on 5/20/22  -Glucose per labs today 265  -Endocrine consulted for better control      Uninterrupted Critical Care/Counseling Time (not including procedures): 60 minutes      DEYA Martinez  Heart Transplant  [facility] - Cardiac Intensive Care

## 2022-06-21 NOTE — ASSESSMENT & PLAN NOTE
"-[facility]  -Approved for LVAD at [facility] on 4/2/22. Was not evaluated for OHTx as he quit smoking in April 2022  -Moved to ICU 6/13 in the setting of significant volume overloaded and low output state. IABP placed 6/13 and Epi and Marcella added for RV support  -Lasix gtt D/C"d 6/17 2/2 increase in sCr. Given 250 cc IV NS and sCr improved  -CVP: 12, SVO2:34, CO: 4.02, CI: 1.77, SVR:1631.  Repeat CVP on rounds was 18 so gave 120mg IVP of lasix and restarted continuous infusion at 40mg/hr   -IABP 1:1, IV milrinone 0.375, epi 0.02, and Marcella 10 ppm.   -TTE 6/14: LVEF 10%, G3DD, mild-moderate reduced RVSF, moderate-severe MR, moderate-sever tr, CVP 15, PASP 56, moderate PH, LVEDD 7.59  -RHC done 4/19/22 on Milrinone 0.5 mcg/kg/min: RA 11, PA 50/27 (35), W 27, CO/CI 3.7/1.7, PVR 2.2 and SVR 1535   -GDMT: Entresto and Aldactone on hold since admit 5/24-5/27 2/2 elevated sCr  -Reports dry weight is 217#, and that he had gained 6# on his home scale PTA  - VAD implant today. Unlikely OHTx candidate with smoking history (quit April 2022)         "

## 2022-06-21 NOTE — SUBJECTIVE & OBJECTIVE
"Interval HPI:   Overnight events: No acute events overnight. Patient on the CICU in room [id number] A. Blood glucose stable. BG at and above goal on current insulin regimen (SSI, prandial, and basal insulin ). Steroid use- None. Day of Surgery  Renal function- Abnormal - Creatinine 1.5   Vasopressors-  Epinephrine       Diet NPO  Diet NPO     Eating:   NPO  Nausea: No  Hypoglycemia and intervention: No  Fever: No  TPN and/or TF: No    BP (!) 136/98   Pulse (!) 122   Temp 98.6 °F (37 °C) (Oral)   Resp (!) 32   Ht 6' 3" (1.905 m)   Wt 97.5 kg (215 lb)   SpO2 (!) 94%   BMI 26.87 kg/m²     Labs Reviewed and Include    Recent Labs   Lab 06/21/22  0418   *   CALCIUM 9.7   ALBUMIN 3.3*   PROT 7.7   *   K 3.9   CO2 29   CL 94*   BUN 25*   CREATININE 1.5*   ALKPHOS 106   ALT 22   AST 26   BILITOT 0.8     Lab Results   Component Value Date    WBC 11.25 06/21/2022    HGB 10.0 (L) 06/21/2022    HCT 30.8 (L) 06/21/2022    MCV 89 06/21/2022     06/21/2022     No results for input(s): TSH, FREET4 in the last 168 hours.  Lab Results   Component Value Date    HGBA1C 9.2 (H) 05/20/2022       Nutritional status:   Body mass index is 26.87 kg/m².  Lab Results   Component Value Date    ALBUMIN 3.3 (L) 06/21/2022    ALBUMIN 3.2 (L) 06/20/2022    ALBUMIN 3.3 (L) 06/19/2022     Lab Results   Component Value Date    PREALBUMIN 36 04/18/2022       Estimated Creatinine Clearance: 80.6 mL/min (A) (based on SCr of 1.5 mg/dL (H)).    Accu-Checks  Recent Labs     06/18/22  1649 06/18/22  2159 06/19/22  1150 06/19/22  1721 06/19/22  2231 06/20/22  0816 06/20/22  1104 06/20/22  1702 06/20/22  2144 06/21/22  0744   POCTGLUCOSE 221* 277* 259* 251* 285* 260* 232* 166* 259* 227*       Current Medications and/or Treatments Impacting Glycemic Control  Immunotherapy:    Immunosuppressants       None          Steroids:   Hormones (From admission, onward)                None          Pressors:    Autonomic Drugs (From " admission, onward)                Start     Stop Route Frequency Ordered    06/13/22 2130  EPINEPHrine (ADRENALIN) 5 mg in dextrose 5 % 250 mL infusion         -- IV Continuous 06/13/22 2024          Hyperglycemia/Diabetes Medications:   Antihyperglycemics (From admission, onward)                Start     Stop Route Frequency Ordered    06/21/22 1005  insulin aspart U-100 pen 1-10 Units         -- SubQ Every 4 hours PRN 06/21/22 0905    06/21/22 0900  insulin detemir U-100 pen 24 Units         -- SubQ Daily 06/20/22 0943    06/20/22 1130  insulin aspart U-100 pen 12 Units         -- SubQ 3 times daily with meals 06/20/22 0943

## 2022-06-22 PROBLEM — R78.81 POSITIVE BLOOD CULTURE: Status: ACTIVE | Noted: 2022-06-22

## 2022-06-22 LAB
ALBUMIN SERPL BCP-MCNC: 3.4 G/DL (ref 3.5–5.2)
ALLENS TEST: ABNORMAL
ALLENS TEST: NORMAL
ALP SERPL-CCNC: 113 U/L (ref 55–135)
ALT SERPL W/O P-5'-P-CCNC: 18 U/L (ref 10–44)
ANION GAP SERPL CALC-SCNC: 10 MMOL/L (ref 8–16)
ANION GAP SERPL CALC-SCNC: 13 MMOL/L (ref 8–16)
APTT BLDCRRT: 46.1 SEC (ref 21–32)
APTT BLDCRRT: 49.5 SEC (ref 21–32)
AST SERPL-CCNC: 24 U/L (ref 10–40)
BASOPHILS # BLD AUTO: 0.03 K/UL (ref 0–0.2)
BASOPHILS # BLD AUTO: 0.04 K/UL (ref 0–0.2)
BASOPHILS NFR BLD: 0.2 % (ref 0–1.9)
BASOPHILS NFR BLD: 0.3 % (ref 0–1.9)
BILIRUB SERPL-MCNC: 1.3 MG/DL (ref 0.1–1)
BUN SERPL-MCNC: 32 MG/DL (ref 6–20)
BUN SERPL-MCNC: 37 MG/DL (ref 6–20)
CALCIUM SERPL-MCNC: 9.7 MG/DL (ref 8.7–10.5)
CALCIUM SERPL-MCNC: 9.8 MG/DL (ref 8.7–10.5)
CHLORIDE SERPL-SCNC: 87 MMOL/L (ref 95–110)
CHLORIDE SERPL-SCNC: 89 MMOL/L (ref 95–110)
CO2 SERPL-SCNC: 30 MMOL/L (ref 23–29)
CO2 SERPL-SCNC: 33 MMOL/L (ref 23–29)
CREAT SERPL-MCNC: 1.8 MG/DL (ref 0.5–1.4)
CREAT SERPL-MCNC: 1.8 MG/DL (ref 0.5–1.4)
DELSYS: ABNORMAL
DELSYS: NORMAL
DIFFERENTIAL METHOD: ABNORMAL
DIFFERENTIAL METHOD: ABNORMAL
EOSINOPHIL # BLD AUTO: 0 K/UL (ref 0–0.5)
EOSINOPHIL # BLD AUTO: 0.1 K/UL (ref 0–0.5)
EOSINOPHIL NFR BLD: 0.2 % (ref 0–8)
EOSINOPHIL NFR BLD: 0.5 % (ref 0–8)
ERYTHROCYTE [DISTWIDTH] IN BLOOD BY AUTOMATED COUNT: 14.3 % (ref 11.5–14.5)
ERYTHROCYTE [DISTWIDTH] IN BLOOD BY AUTOMATED COUNT: 14.4 % (ref 11.5–14.5)
EST. GFR  (AFRICAN AMERICAN): 54.4 ML/MIN/1.73 M^2
EST. GFR  (AFRICAN AMERICAN): 54.4 ML/MIN/1.73 M^2
EST. GFR  (NON AFRICAN AMERICAN): 47 ML/MIN/1.73 M^2
EST. GFR  (NON AFRICAN AMERICAN): 47 ML/MIN/1.73 M^2
GLUCOSE SERPL-MCNC: 210 MG/DL (ref 70–110)
GLUCOSE SERPL-MCNC: 331 MG/DL (ref 70–110)
HCO3 UR-SCNC: 33.1 MMOL/L (ref 24–28)
HCO3 UR-SCNC: 34.7 MMOL/L (ref 24–28)
HCO3 UR-SCNC: 34.8 MMOL/L (ref 24–28)
HCT VFR BLD AUTO: 29.6 % (ref 40–54)
HCT VFR BLD AUTO: 30.3 % (ref 40–54)
HGB BLD-MCNC: 10 G/DL (ref 14–18)
HGB BLD-MCNC: 10.1 G/DL (ref 14–18)
IMM GRANULOCYTES # BLD AUTO: 0.04 K/UL (ref 0–0.04)
IMM GRANULOCYTES # BLD AUTO: 0.11 K/UL (ref 0–0.04)
IMM GRANULOCYTES NFR BLD AUTO: 0.3 % (ref 0–0.5)
IMM GRANULOCYTES NFR BLD AUTO: 0.9 % (ref 0–0.5)
LACTATE SERPL-SCNC: 1.6 MMOL/L (ref 0.5–2.2)
LDH SERPL L TO P-CCNC: 1.02 MMOL/L (ref 0.5–2.2)
LYMPHOCYTES # BLD AUTO: 1.4 K/UL (ref 1–4.8)
LYMPHOCYTES # BLD AUTO: 1.9 K/UL (ref 1–4.8)
LYMPHOCYTES NFR BLD: 10.5 % (ref 18–48)
LYMPHOCYTES NFR BLD: 15.8 % (ref 18–48)
MAGNESIUM SERPL-MCNC: 1.8 MG/DL (ref 1.6–2.6)
MCH RBC QN AUTO: 29.2 PG (ref 27–31)
MCH RBC QN AUTO: 29.2 PG (ref 27–31)
MCHC RBC AUTO-ENTMCNC: 33.3 G/DL (ref 32–36)
MCHC RBC AUTO-ENTMCNC: 33.8 G/DL (ref 32–36)
MCV RBC AUTO: 87 FL (ref 82–98)
MCV RBC AUTO: 88 FL (ref 82–98)
METHEMOGLOBIN: 0.5 % (ref 0–3)
MONOCYTES # BLD AUTO: 0.8 K/UL (ref 0.3–1)
MONOCYTES # BLD AUTO: 1 K/UL (ref 0.3–1)
MONOCYTES NFR BLD: 6.4 % (ref 4–15)
MONOCYTES NFR BLD: 8 % (ref 4–15)
NEUTROPHILS # BLD AUTO: 10.4 K/UL (ref 1.8–7.7)
NEUTROPHILS # BLD AUTO: 9.2 K/UL (ref 1.8–7.7)
NEUTROPHILS NFR BLD: 76.2 % (ref 38–73)
NEUTROPHILS NFR BLD: 80.7 % (ref 38–73)
NRBC BLD-RTO: 0 /100 WBC
NRBC BLD-RTO: 0 /100 WBC
PCO2 BLDA: 52.3 MMHG (ref 35–45)
PCO2 BLDA: 53.7 MMHG (ref 35–45)
PCO2 BLDA: 60 MMHG (ref 35–45)
PH SMN: 7.37 [PH] (ref 7.35–7.45)
PH SMN: 7.41 [PH] (ref 7.35–7.45)
PH SMN: 7.42 [PH] (ref 7.35–7.45)
PLATELET # BLD AUTO: 350 K/UL (ref 150–450)
PLATELET # BLD AUTO: 376 K/UL (ref 150–450)
PMV BLD AUTO: 10.2 FL (ref 9.2–12.9)
PMV BLD AUTO: 9.9 FL (ref 9.2–12.9)
PO2 BLDA: 25 MMHG (ref 40–60)
PO2 BLDA: 25 MMHG (ref 40–60)
PO2 BLDA: 27 MMHG (ref 40–60)
POC BE: 10 MMOL/L
POC BE: 8 MMOL/L
POC BE: 9 MMOL/L
POC SATURATED O2: 40 % (ref 95–100)
POC SATURATED O2: 46 % (ref 95–100)
POC SATURATED O2: 50 % (ref 95–100)
POC TCO2: 35 MMOL/L (ref 24–29)
POC TCO2: 36 MMOL/L (ref 24–29)
POC TCO2: 37 MMOL/L (ref 24–29)
POCT GLUCOSE: 165 MG/DL (ref 70–110)
POCT GLUCOSE: 236 MG/DL (ref 70–110)
POCT GLUCOSE: 285 MG/DL (ref 70–110)
POTASSIUM SERPL-SCNC: 3.6 MMOL/L (ref 3.5–5.1)
POTASSIUM SERPL-SCNC: 3.6 MMOL/L (ref 3.5–5.1)
PROT SERPL-MCNC: 8 G/DL (ref 6–8.4)
RBC # BLD AUTO: 3.42 M/UL (ref 4.6–6.2)
RBC # BLD AUTO: 3.46 M/UL (ref 4.6–6.2)
SAMPLE: ABNORMAL
SAMPLE: NORMAL
SITE: ABNORMAL
SITE: NORMAL
SODIUM SERPL-SCNC: 130 MMOL/L (ref 136–145)
SODIUM SERPL-SCNC: 132 MMOL/L (ref 136–145)
WBC # BLD AUTO: 12.11 K/UL (ref 3.9–12.7)
WBC # BLD AUTO: 12.93 K/UL (ref 3.9–12.7)

## 2022-06-22 PROCEDURE — 25000003 PHARM REV CODE 250: Mod: NTX | Performed by: NURSE PRACTITIONER

## 2022-06-22 PROCEDURE — 99292 CRITICAL CARE ADDL 30 MIN: CPT | Mod: NTX,,, | Performed by: INTERNAL MEDICINE

## 2022-06-22 PROCEDURE — 27000202 HC IABP, ADD'L DAY: Mod: NTX

## 2022-06-22 PROCEDURE — 83735 ASSAY OF MAGNESIUM: CPT | Mod: NTX | Performed by: INTERNAL MEDICINE

## 2022-06-22 PROCEDURE — 85730 THROMBOPLASTIN TIME PARTIAL: CPT | Mod: NTX | Performed by: INTERNAL MEDICINE

## 2022-06-22 PROCEDURE — 83605 ASSAY OF LACTIC ACID: CPT | Mod: NTX

## 2022-06-22 PROCEDURE — 99292 PR CRITICAL CARE, ADDL 30 MIN: ICD-10-PCS | Mod: NTX,,, | Performed by: INTERNAL MEDICINE

## 2022-06-22 PROCEDURE — 63600175 PHARM REV CODE 636 W HCPCS: Mod: NTX | Performed by: INTERNAL MEDICINE

## 2022-06-22 PROCEDURE — 85025 COMPLETE CBC W/AUTO DIFF WBC: CPT | Mod: NTX | Performed by: INTERNAL MEDICINE

## 2022-06-22 PROCEDURE — 99291 PR CRITICAL CARE, E/M 30-74 MINUTES: ICD-10-PCS | Mod: NTX,,, | Performed by: PHYSICIAN ASSISTANT

## 2022-06-22 PROCEDURE — 99223 PR INITIAL HOSPITAL CARE,LEVL III: ICD-10-PCS | Mod: NTX,,, | Performed by: INTERNAL MEDICINE

## 2022-06-22 PROCEDURE — 99232 PR SUBSEQUENT HOSPITAL CARE,LEVL II: ICD-10-PCS | Mod: NTX,,, | Performed by: NURSE PRACTITIONER

## 2022-06-22 PROCEDURE — 25000003 PHARM REV CODE 250: Mod: NTX | Performed by: INTERNAL MEDICINE

## 2022-06-22 PROCEDURE — 63600175 PHARM REV CODE 636 W HCPCS: Mod: NTX | Performed by: NURSE PRACTITIONER

## 2022-06-22 PROCEDURE — 99900035 HC TECH TIME PER 15 MIN (STAT): Mod: NTX

## 2022-06-22 PROCEDURE — 94761 N-INVAS EAR/PLS OXIMETRY MLT: CPT | Mod: NTX

## 2022-06-22 PROCEDURE — 85730 THROMBOPLASTIN TIME PARTIAL: CPT | Mod: 91,NTX | Performed by: INTERNAL MEDICINE

## 2022-06-22 PROCEDURE — C9399 UNCLASSIFIED DRUGS OR BIOLOG: HCPCS | Mod: NTX | Performed by: NURSE PRACTITIONER

## 2022-06-22 PROCEDURE — 80053 COMPREHEN METABOLIC PANEL: CPT | Mod: NTX | Performed by: NURSE PRACTITIONER

## 2022-06-22 PROCEDURE — 80048 BASIC METABOLIC PNL TOTAL CA: CPT | Mod: NTX,XB | Performed by: PHYSICIAN ASSISTANT

## 2022-06-22 PROCEDURE — 63600175 PHARM REV CODE 636 W HCPCS: Mod: NTX | Performed by: STUDENT IN AN ORGANIZED HEALTH CARE EDUCATION/TRAINING PROGRAM

## 2022-06-22 PROCEDURE — 83605 ASSAY OF LACTIC ACID: CPT | Mod: NTX | Performed by: PHYSICIAN ASSISTANT

## 2022-06-22 PROCEDURE — 63600367 HC NITRIC OXIDE PER HOUR: Mod: NTX

## 2022-06-22 PROCEDURE — 20000000 HC ICU ROOM: Mod: NTX

## 2022-06-22 PROCEDURE — 25000003 PHARM REV CODE 250: Mod: NTX | Performed by: PHYSICIAN ASSISTANT

## 2022-06-22 PROCEDURE — 99291 CRITICAL CARE FIRST HOUR: CPT | Mod: NTX,,, | Performed by: PHYSICIAN ASSISTANT

## 2022-06-22 PROCEDURE — 82803 BLOOD GASES ANY COMBINATION: CPT | Mod: NTX

## 2022-06-22 PROCEDURE — 27000221 HC OXYGEN, UP TO 24 HOURS: Mod: NTX

## 2022-06-22 PROCEDURE — 85025 COMPLETE CBC W/AUTO DIFF WBC: CPT | Mod: 91,NTX | Performed by: PHYSICIAN ASSISTANT

## 2022-06-22 PROCEDURE — 99223 1ST HOSP IP/OBS HIGH 75: CPT | Mod: NTX,,, | Performed by: INTERNAL MEDICINE

## 2022-06-22 PROCEDURE — 99232 SBSQ HOSP IP/OBS MODERATE 35: CPT | Mod: NTX,,, | Performed by: NURSE PRACTITIONER

## 2022-06-22 RX ORDER — VANCOMYCIN HCL IN 5 % DEXTROSE 1G/250ML
1000 PLASTIC BAG, INJECTION (ML) INTRAVENOUS
Status: DISCONTINUED | OUTPATIENT
Start: 2022-06-22 | End: 2022-06-22

## 2022-06-22 RX ORDER — POTASSIUM CHLORIDE 750 MG/1
50 CAPSULE, EXTENDED RELEASE ORAL ONCE
Status: COMPLETED | OUTPATIENT
Start: 2022-06-22 | End: 2022-06-22

## 2022-06-22 RX ORDER — ALPRAZOLAM 0.25 MG/1
0.25 TABLET ORAL 2 TIMES DAILY PRN
Status: DISCONTINUED | OUTPATIENT
Start: 2022-06-22 | End: 2022-06-29

## 2022-06-22 RX ORDER — VANCOMYCIN HCL IN 5 % DEXTROSE 1G/250ML
1000 PLASTIC BAG, INJECTION (ML) INTRAVENOUS
Status: DISCONTINUED | OUTPATIENT
Start: 2022-06-23 | End: 2022-06-24

## 2022-06-22 RX ADMIN — METHOCARBAMOL TABLETS 500 MG: 500 TABLET, COATED ORAL at 08:06

## 2022-06-22 RX ADMIN — VANCOMYCIN HYDROCHLORIDE 1500 MG: 1.5 INJECTION, POWDER, LYOPHILIZED, FOR SOLUTION INTRAVENOUS at 02:06

## 2022-06-22 RX ADMIN — MILRINONE LACTATE IN DEXTROSE 0.38 MCG/KG/MIN: 200 INJECTION, SOLUTION INTRAVENOUS at 03:06

## 2022-06-22 RX ADMIN — GABAPENTIN 300 MG: 300 CAPSULE ORAL at 03:06

## 2022-06-22 RX ADMIN — INSULIN ASPART 4 UNITS: 100 INJECTION, SOLUTION INTRAVENOUS; SUBCUTANEOUS at 05:06

## 2022-06-22 RX ADMIN — GABAPENTIN 300 MG: 300 CAPSULE ORAL at 08:06

## 2022-06-22 RX ADMIN — HEPARIN SODIUM 24 UNITS/KG/HR: 5000 INJECTION INTRAVENOUS; SUBCUTANEOUS at 01:06

## 2022-06-22 RX ADMIN — HYDROCODONE BITARTRATE AND ACETAMINOPHEN 1 TABLET: 5; 325 TABLET ORAL at 10:06

## 2022-06-22 RX ADMIN — METHOCARBAMOL TABLETS 500 MG: 500 TABLET, COATED ORAL at 01:06

## 2022-06-22 RX ADMIN — POTASSIUM CHLORIDE 50 MEQ: 10 CAPSULE, COATED, EXTENDED RELEASE ORAL at 05:06

## 2022-06-22 RX ADMIN — INSULIN ASPART 2 UNITS: 100 INJECTION, SOLUTION INTRAVENOUS; SUBCUTANEOUS at 08:06

## 2022-06-22 RX ADMIN — ACETAMINOPHEN 650 MG: 325 TABLET ORAL at 06:06

## 2022-06-22 RX ADMIN — ACETAMINOPHEN 650 MG: 325 TABLET ORAL at 01:06

## 2022-06-22 RX ADMIN — PANTOPRAZOLE SODIUM 40 MG: 40 TABLET, DELAYED RELEASE ORAL at 08:06

## 2022-06-22 RX ADMIN — GABAPENTIN 300 MG: 300 CAPSULE ORAL at 09:06

## 2022-06-22 RX ADMIN — MIRTAZAPINE 15 MG: 15 TABLET, FILM COATED ORAL at 09:06

## 2022-06-22 RX ADMIN — ALPRAZOLAM 0.25 MG: 0.25 TABLET ORAL at 03:06

## 2022-06-22 RX ADMIN — Medication 800 MG: at 06:06

## 2022-06-22 RX ADMIN — INSULIN ASPART 6 UNITS: 100 INJECTION, SOLUTION INTRAVENOUS; SUBCUTANEOUS at 09:06

## 2022-06-22 RX ADMIN — Medication 400 MG: at 08:06

## 2022-06-22 RX ADMIN — FUROSEMIDE 30 MG/HR: 10 INJECTION, SOLUTION INTRAMUSCULAR; INTRAVENOUS at 09:06

## 2022-06-22 RX ADMIN — SENNOSIDES AND DOCUSATE SODIUM 1 TABLET: 50; 8.6 TABLET ORAL at 08:06

## 2022-06-22 RX ADMIN — METHOCARBAMOL TABLETS 500 MG: 500 TABLET, COATED ORAL at 05:06

## 2022-06-22 RX ADMIN — EPINEPHRINE 0.02 MCG/KG/MIN: 1 INJECTION INTRAMUSCULAR; INTRAVENOUS; SUBCUTANEOUS at 06:06

## 2022-06-22 RX ADMIN — POLYETHYLENE GLYCOL 3350 17 G: 17 POWDER, FOR SOLUTION ORAL at 09:06

## 2022-06-22 RX ADMIN — ALPRAZOLAM 0.25 MG: 0.25 TABLET ORAL at 09:06

## 2022-06-22 RX ADMIN — SUCRALFATE 1 G: 1 TABLET ORAL at 09:06

## 2022-06-22 RX ADMIN — METHOCARBAMOL TABLETS 500 MG: 500 TABLET, COATED ORAL at 09:06

## 2022-06-22 RX ADMIN — BUSPIRONE HYDROCHLORIDE 7.5 MG: 5 TABLET ORAL at 09:06

## 2022-06-22 RX ADMIN — HYDROCODONE BITARTRATE AND ACETAMINOPHEN 1 TABLET: 5; 325 TABLET ORAL at 01:06

## 2022-06-22 RX ADMIN — POTASSIUM BICARBONATE 50 MEQ: 978 TABLET, EFFERVESCENT ORAL at 06:06

## 2022-06-22 RX ADMIN — BUSPIRONE HYDROCHLORIDE 7.5 MG: 5 TABLET ORAL at 08:06

## 2022-06-22 RX ADMIN — POTASSIUM CHLORIDE 30 MEQ: 10 CAPSULE, COATED, EXTENDED RELEASE ORAL at 08:06

## 2022-06-22 RX ADMIN — INSULIN DETEMIR 20 UNITS: 100 INJECTION, SOLUTION SUBCUTANEOUS at 08:06

## 2022-06-22 RX ADMIN — POTASSIUM CHLORIDE 30 MEQ: 10 CAPSULE, COATED, EXTENDED RELEASE ORAL at 09:06

## 2022-06-22 RX ADMIN — HYDROCODONE BITARTRATE AND ACETAMINOPHEN 1 TABLET: 5; 325 TABLET ORAL at 06:06

## 2022-06-22 RX ADMIN — ASPIRIN 81 MG CHEWABLE TABLET 81 MG: 81 TABLET CHEWABLE at 08:06

## 2022-06-22 RX ADMIN — POLYETHYLENE GLYCOL 3350 17 G: 17 POWDER, FOR SOLUTION ORAL at 08:06

## 2022-06-22 RX ADMIN — Medication 400 MG: at 09:06

## 2022-06-22 RX ADMIN — MILRINONE LACTATE IN DEXTROSE 0.38 MCG/KG/MIN: 200 INJECTION, SOLUTION INTRAVENOUS at 09:06

## 2022-06-22 NOTE — ASSESSMENT & PLAN NOTE
Endocrinology consulted for BG management.   BG goal 140-180    - Levemir Flex Pen 20 units daily  - Novolog (aspart) insulin prn for BG excursions MDC SSI (150/25).   - BG checks q4hr  - Hypoglycemia protocol in place    - Will likely require IIP s/p LVAD  ** Please notify Endocrine for any change and/or advance in diet**  ** Please call Endocrine for any BG related issues **    Discharge Planning:   TBD. Please notify endocrinology prior to discharge.

## 2022-06-22 NOTE — SUBJECTIVE & OBJECTIVE
"Interval HPI:   Overnight events: No acute events overnight. Of note, BG checks q4hr and SSI not done overnight, Patient on the CICU in room [id number]/[id number] A. Blood glucose stable. BG at and above goal on current insulin regimen (SSI + basal). Steroid use- None. * Surgery Date in Future *  Renal function- Abnormal - Creatinine 1.8   Vasopressors-  Epinephrine       Diet NPO     Eating:   NPO  Nausea: No  Hypoglycemia and intervention: No  Fever: No  TPN and/or TF: No    /75 (BP Location: Left arm, Patient Position: Lying)   Pulse 107   Temp 98.6 °F (37 °C) (Oral)   Resp 19   Ht 6' 3" (1.905 m)   Wt 96.2 kg (212 lb 1.3 oz)   SpO2 96%   BMI 26.51 kg/m²     Labs Reviewed and Include    Recent Labs   Lab 06/22/22  0344   *   CALCIUM 9.8   ALBUMIN 3.4*   PROT 8.0   *   K 3.6   CO2 30*   CL 89*   BUN 32*   CREATININE 1.8*   ALKPHOS 113   ALT 18   AST 24   BILITOT 1.3*     Lab Results   Component Value Date    WBC 12.93 (H) 06/22/2022    HGB 10.1 (L) 06/22/2022    HCT 30.3 (L) 06/22/2022    MCV 88 06/22/2022     06/22/2022     No results for input(s): TSH, FREET4 in the last 168 hours.  Lab Results   Component Value Date    HGBA1C 9.2 (H) 05/20/2022       Nutritional status:   Body mass index is 26.51 kg/m².  Lab Results   Component Value Date    ALBUMIN 3.4 (L) 06/22/2022    ALBUMIN 3.3 (L) 06/21/2022    ALBUMIN 3.2 (L) 06/20/2022     Lab Results   Component Value Date    PREALBUMIN 36 04/18/2022       Estimated Creatinine Clearance: 67.2 mL/min (A) (based on SCr of 1.8 mg/dL (H)).    Accu-Checks  Recent Labs     06/19/22  1721 06/19/22  2231 06/20/22  0816 06/20/22  1104 06/20/22  1702 06/20/22  2144 06/21/22  0744 06/21/22  1057 06/21/22  1636 06/21/22  2155   POCTGLUCOSE 251* 285* 260* 232* 166* 259* 227* 201* 295* 182*       Current Medications and/or Treatments Impacting Glycemic Control  Immunotherapy:    Immunosuppressants       None          Steroids:   Hormones (From admission, " onward)                None          Pressors:    Autonomic Drugs (From admission, onward)                Start     Stop Route Frequency Ordered    06/13/22 2130  EPINEPHrine (ADRENALIN) 5 mg in dextrose 5 % 250 mL infusion         -- IV Continuous 06/13/22 2024          Hyperglycemia/Diabetes Medications:   Antihyperglycemics (From admission, onward)                Start     Stop Route Frequency Ordered    06/22/22 0900  insulin detemir U-100 pen 20 Units         -- SubQ Daily 06/21/22 1317    06/21/22 1416  insulin aspart U-100 pen 1-10 Units         -- SubQ Every 4 hours PRN 06/21/22 1317

## 2022-06-22 NOTE — ASSESSMENT & PLAN NOTE
Titrate insulin slowly to avoid hypoglycemia as the risk of hypoglycemia increases with decreased creatinine clearance.    Estimated Creatinine Clearance: 67.2 mL/min (A) (based on SCr of 1.8 mg/dL (H)).

## 2022-06-22 NOTE — NURSING
2214- Hermes from lab called with Gram positive cocci in cluster 1 out of 1 blood cultures. Notified Dr. Silva while at bedside with patient @ 6020. New orders obtained and implemented.

## 2022-06-22 NOTE — ASSESSMENT & PLAN NOTE
-Admit sCr 1.8, baseline ~ 1.5-1.7  -creatinine elevated to 2.0 but improved after fluid.    -creatinine has been stable and is 1.8 today  -Will monitor response

## 2022-06-22 NOTE — PT/OT/SLP PROGRESS
Physical Therapy      Patient Name:  Kevan Queen   MRN:  [id number]    Patient not seen today secondary to  (pt not seen but can meet plan of care with subsequent PT visits.). Will follow-up at a later date.    6/22/2022  .

## 2022-06-22 NOTE — CARE UPDATE
Hemodynamics Care Update Note    SVO2: 46  CVP: 14  CO: 5.17  CI: 2.27  SVR: 866  I: 1.38 O: 2.37  (calculated using oxygen consumption constant of 3.5)    Plan:  Will bolus with Lasix 80 IV once, and increase Lasix gtt to 30 mg/hr      Case discussed with on call HTS attending.    Minor Silva, PGY-4  Cardiovascular Disease  [facility]

## 2022-06-22 NOTE — SUBJECTIVE & OBJECTIVE
Past Medical History:   Diagnosis Date    Arthritis     Cardiomyopathy     CHF (congestive heart failure) 10/01/2020    Diabetes mellitus     Dilated cardiomyopathy 10/26/2020    Drug abuse 10/2020    Hyperosmolar hyperglycemic state (HHS) 5/25/2022    ICD (implantable cardioverter-defibrillator) in place 10/26/2020    Renal disorder        Past Surgical History:   Procedure Laterality Date    CARDIAC DEFIBRILLATOR PLACEMENT      RIGHT HEART CATHETERIZATION Right 4/8/2022    Procedure: INSERTION, CATHETER, RIGHT HEART;  Surgeon: Luca Lopez Jr., MD;  Location: [facility] CATH LAB;  Service: Cardiology;  Laterality: Right;    RIGHT HEART CATHETERIZATION Right 4/19/2022    Procedure: INSERTION, CATHETER, RIGHT HEART;  Surgeon: Josh Pulido MD;  Location: [facility] CATH LAB;  Service: Cardiology;  Laterality: Right;       Review of patient's allergies indicates:   Allergen Reactions    Aspirin Other (See Comments)     Mr. Thacker is enrolled in Dr. Paige's Laon Trial and cannot have any aspirin and/or aspirin-containing products. DO NOT cancel any orders for INV Aspirin 100 mg/Placebo. If you have questions, please contact Isabel @ [phone], [phone],[email or web address], secure chat or MS Teams message.    Bumex [bumetanide] Hives    Lactose Diarrhea     Other reaction(s): Abdominal distension, gaseous    Torsemide Hives       Medications:  Medications Prior to Admission   Medication Sig    busPIRone (BUSPAR) 7.5 MG tablet Take 7.5 mg by mouth every 12 (twelve) hours.    EScitalopram oxalate (LEXAPRO) 5 MG Tab Take 1 tablet (5 mg total) by mouth once daily.    furosemide (LASIX) 80 MG tablet Take 80 mg by mouth 2 (two) times daily.    insulin aspart U-100 (NOVOLOG) 100 unit/mL (3 mL) InPn pen Inject 12 Units into the skin 3 (three) times daily.    insulin detemir U-100 (LEVEMIR FLEXTOUCH) 100 unit/mL (3 mL) SubQ InPn pen Inject 23 Units into the skin once daily.    mirtazapine (REMERON) 15 MG tablet Take  "15 mg by mouth nightly.    pantoprazole (PROTONIX) 40 MG tablet Take 1 tablet (40 mg total) by mouth once daily.    potassium chloride SA (K-DUR,KLOR-CON) 20 MEQ tablet Take 2 tablets (40 mEq total) by mouth once daily.    sucralfate (CARAFATE) 1 gram tablet Take 1 g by mouth nightly.    albuterol (PROVENTIL/VENTOLIN HFA) 90 mcg/actuation inhaler INHALE 2 PUFFS BY MOUTH EVERY 4 HOURS AS NEEDED FOR COUGH OR WHEEZING    blood sugar diagnostic Strp Use to test blood glucose 4 (four) times daily.    blood-glucose meter Misc Use as instructed    lancets 33 gauge Misc Use to test blood glucose 4 (four) times daily.    milrinone (PRIMACOR) 1 mg/mL injection Inject into the vein.    milrinone 20mg/100ml D5W, 200mcg/ml, (PRIMACOR) 20 mg/100 mL (200 mcg/mL) infusion Inject 34.875 mcg/min into the vein continuous.    pen needle, diabetic 31 gauge x 1/4" Ndle 1 Device by Misc.(Non-Drug; Combo Route) route 4 (four) times daily.    promethazine (PHENERGAN) 12.5 MG Tab Take 1 tablet (12.5 mg total) by mouth every 6 (six) hours as needed (nausea).    traMADoL (ULTRAM) 50 mg tablet Take 1 tablet (50 mg total) by mouth every 6 (six) hours as needed for Pain.     Antibiotics (From admission, onward)                Start     Stop Route Frequency Ordered    06/21/22 0445  vancomycin 1.25 g in dextrose 5% 250 mL IVPB (ready to mix)  (vancomycin IVPB)         06/21 1644 IV Once pre-op 06/21/22 0436    06/21/22 0445  cefepime in dextrose 5 % IVPB 2 g         06/21 1644 IV Once pre-op 06/21/22 0436    06/21/22 0435  mupirocin 2 % ointment         -- [facility] On Call Procedure 06/21/22 0436          Antifungals (From admission, onward)                None          Antivirals (From admission, onward)      None             There is no immunization history for the selected administration types on file for this patient.    Family History       Problem Relation (Age of Onset)    Diverticulosis Brother    Heart attack Maternal Grandmother, Maternal " Grandfather    Heart failure Father          Social History     Socioeconomic History    Marital status: Legally    Tobacco Use    Smoking status: Former Smoker     Packs/day: 0.50     Years: 16.00     Pack years: 8.00     Start date: 10/1/2004     Quit date: 2021     Years since quittin.1    Smokeless tobacco: Never Used   Substance and Sexual Activity    Alcohol use: Not Currently    Drug use: Not Currently     Types: Marijuana, MDMA (Ecstacy)    Sexual activity: Yes     Partners: Female     Birth control/protection: None     Review of Systems   Constitutional:  Negative for chills, diaphoresis and fever.   HENT:  Negative for rhinorrhea and sore throat.    Respiratory:  Negative for cough and shortness of breath.    Cardiovascular:  Negative for chest pain and leg swelling.   Gastrointestinal:  Negative for abdominal pain, diarrhea, nausea and vomiting.   Genitourinary:  Negative for dysuria and hematuria.   Musculoskeletal:  Negative for arthralgias and myalgias.   Skin:  Negative for rash.        Tenderness around right femoral line   Neurological:  Negative for headaches.   Objective:     Vital Signs (Most Recent):  Temp: 98.1 °F (36.7 °C) (22 1100)  Pulse: 108 (22 1000)  Resp: 18 (22 1000)  BP: 103/72 (22 1000)  SpO2: 99 % (22 1000) Vital Signs (24h Range):  Temp:  [98 °F (36.7 °C)-98.6 °F (37 °C)] 98.1 °F (36.7 °C)  Pulse:  [105-125] 108  Resp:  [14-34] 18  SpO2:  [92 %-99 %] 99 %  BP: ()/(56-81) 103/72     Weight: 96.2 kg (212 lb 1.3 oz)  Body mass index is 26.51 kg/m².    Estimated Creatinine Clearance: 67.2 mL/min (A) (based on SCr of 1.8 mg/dL (H)).    Physical Exam  Vitals reviewed.   Constitutional:       General: He is not in acute distress.     Appearance: He is well-developed. He is not diaphoretic.   HENT:      Head: Normocephalic and atraumatic.   Eyes:      Conjunctiva/sclera: Conjunctivae normal.   Neck:      Comments: LIJ line  Pulmonary:       Effort: Pulmonary effort is normal. No respiratory distress.   Abdominal:      General: There is no distension.      Palpations: Abdomen is soft.   Musculoskeletal:         General: Normal range of motion.   Skin:     General: Skin is warm and dry.      Findings: No erythema or rash.      Comments: Right fem line site, tenderness to palpation, but no redness, swelling, warmth, drainage     Neurological:      Mental Status: He is alert and oriented to person, place, and time.   Psychiatric:         Behavior: Behavior normal.       Significant Labs: All pertinent labs within the past 24 hours have been reviewed.    Significant Imaging: I have reviewed all pertinent imaging results/findings within the past 24 hours.

## 2022-06-22 NOTE — ASSESSMENT & PLAN NOTE
-Blood cultures drawn 6/20 positive for gram + cocci  -PICC line removed and IJ exchanged on 6/20  -Repeat cultures from 6/21 negative thus far  -WBC mildly elevated today at 12.93  -patient has been afebrile  -ID consulted given upcoming planned LVAD implant tomorrow

## 2022-06-22 NOTE — SUBJECTIVE & OBJECTIVE
**Interval History: Lasix drip decreased to 20mg/hr yesterday afternoon but was increased back to 30mg/hr overnight.  Blood cultures from 6/20 (one bottle) positive for gram + cocci.  Repeats from 6/21 negative thus far.  However, WBC mildly elevated at 12.93 today.  Stat ID consult for clearance given anticipated LVAD implant tomorrow 6/23.  Patient is negative 2.4L in the last 24 hours.  CVP: 14, SVO2: 50, CO: 5.37, CI: 2.37, SVR: 1162.  Creatinine is 1.8 today up from 1.5 yesterday.  Will continue current dose of Lasix and get repeat labs at 1300.     Lines:  IABP 6/16  Left IJ: 6/20    Continuous Infusions:   sodium chloride 0.9% Stopped (06/18/22 0017)    EPINEPHrine 0.02 mcg/kg/min (06/22/22 1000)    furosemide (LASIX) 10 mg/mL infusion (non-titrating) 30 mg/hr (06/22/22 1000)    heparin (porcine) in D5W Stopped (06/22/22 0340)    milrinone 20mg/100ml D5W (200mcg/ml) 0.375 mcg/kg/min (06/22/22 1000)    nitric oxide gas       Scheduled Meds:   acetaminophen  650 mg Oral Q8H    aspirin  81 mg Oral Daily    busPIRone  7.5 mg Oral Q12H    gabapentin  300 mg Oral TID    HYDROcodone-acetaminophen  1 tablet Oral Q8H    insulin detemir U-100  20 Units Subcutaneous Daily    magnesium oxide  400 mg Oral BID    methocarbamoL  500 mg Oral QID    mirtazapine  15 mg Oral Nightly    pantoprazole  40 mg Oral Daily    polyethylene glycol  17 g Oral BID    potassium chloride  30 mEq Oral BID    senna-docusate 8.6-50 mg  1 tablet Oral Daily    sucralfate  1 g Oral Nightly     PRN Meds:sodium chloride, sodium chloride, sodium chloride, ALPRAZolam, bisacodyL, dextrose 10%, dextrose 10%, glucagon (human recombinant), insulin aspart U-100, magnesium oxide, magnesium oxide, mupirocin, potassium bicarbonate, potassium bicarbonate, potassium bicarbonate, potassium, sodium phosphates, potassium, sodium phosphates, potassium, sodium phosphates, promethazine, sodium chloride 0.9%    Review of patient's allergies indicates:   Allergen  Reactions    Aspirin Other (See Comments)     Mr. Thacker is enrolled in Dr. Paige's Alon Trial and cannot have any aspirin and/or aspirin-containing products. DO NOT cancel any orders for INV Aspirin 100 mg/Placebo. If you have questions, please contact Isabel @ [phone], [phone],[email or web address], secure chat or MS Teams message.    Bumex [bumetanide] Hives    Lactose Diarrhea     Other reaction(s): Abdominal distension, gaseous    Torsemide Hives     Objective:     Vital Signs (Most Recent):  Temp: 98.1 °F (36.7 °C) (06/22/22 0715)  Pulse: 108 (06/22/22 1000)  Resp: 18 (06/22/22 1000)  BP: 103/72 (06/22/22 1000)  SpO2: 99 % (06/22/22 1000)   Vital Signs (24h Range):  Temp:  [98 °F (36.7 °C)-98.6 °F (37 °C)] 98.1 °F (36.7 °C)  Pulse:  [105-125] 108  Resp:  [14-47] 18  SpO2:  [92 %-100 %] 99 %  BP: ()/(56-81) 103/72     Patient Vitals for the past 72 hrs (Last 3 readings):   Weight   06/22/22 0300 96.2 kg (212 lb 1.3 oz)   06/21/22 0900 97.5 kg (215 lb)   06/21/22 0612 97.7 kg (215 lb 6.2 oz)       Body mass index is 26.51 kg/m².      Intake/Output Summary (Last 24 hours) at 6/22/2022 1110  Last data filed at 6/22/2022 1000  Gross per 24 hour   Intake 2253.21 ml   Output 5380 ml   Net -3126.79 ml         Hemodynamic Parameters:       Telemetry: ST    Physical Exam  Constitutional:       Appearance: Normal appearance.   HENT:      Head: Normocephalic and atraumatic.   Eyes:      Conjunctiva/sclera: Conjunctivae normal.      Pupils: Pupils are equal, round, and reactive to light.   Neck:      Comments: LIJ TLC  Cardiovascular:      Rate and Rhythm: Regular rhythm. Tachycardia present.      Comments: +S3  Pulmonary:      Effort: Pulmonary effort is normal.      Breath sounds: Normal breath sounds.   Abdominal:      General: Bowel sounds are normal. There is distension.   Musculoskeletal:         General: No swelling. Normal range of motion.      Cervical back: Neck supple.   Skin:     General: Skin is  warm and dry.      Capillary Refill: Capillary refill takes 2 to 3 seconds.   Neurological:      General: No focal deficit present.      Mental Status: He is alert and oriented to person, place, and time.   Psychiatric:         Mood and Affect: Mood normal.         Behavior: Behavior normal.         Thought Content: Thought content normal.         Judgment: Judgment normal.       Significant Labs:  CBC:  Recent Labs   Lab 06/20/22  0352 06/21/22  0418 06/22/22  0344   WBC 9.60 11.25 12.93*   RBC 3.46* 3.47* 3.46*   HGB 10.5* 10.0* 10.1*   HCT 31.2* 30.8* 30.3*    323 350   MCV 90 89 88   MCH 30.3 28.8 29.2   MCHC 33.7 32.5 33.3       BNP:  Recent Labs   Lab 06/17/22  0332   *       CMP:  Recent Labs   Lab 06/20/22  0352 06/20/22  1428 06/21/22  0418 06/21/22  1459 06/21/22  1903 06/22/22  0344   *   < > 208* 237* 203* 210*   CALCIUM 9.9   < > 9.7 9.5 9.4 9.8   ALBUMIN 3.2*  --  3.3*  --   --  3.4*   PROT 7.4  --  7.7  --   --  8.0   *   < > 134* 132* 132* 132*   K 4.5   < > 3.9 4.2 4.0 3.6   CO2 29   < > 29 29 29 30*   CL 97   < > 94* 92* 90* 89*   BUN 24*   < > 25* 30* 30* 32*   CREATININE 1.5*   < > 1.5* 1.7* 1.5* 1.8*   ALKPHOS 102  --  106  --   --  113   ALT 14  --  22  --   --  18   AST 22  --  26  --   --  24   BILITOT 0.6  --  0.8  --   --  1.3*    < > = values in this interval not displayed.        Coagulation:   Recent Labs   Lab 06/20/22  1607 06/21/22  0418 06/21/22  1154 06/21/22  1903 06/22/22  0344   INR 1.0  --   --   --   --    APTT  --    < > 54.7* 46.9* 46.1*    < > = values in this interval not displayed.       LDH:  No results for input(s): LDH in the last 72 hours.  Microbiology:  Microbiology Results (last 7 days)       Procedure Component Value Units Date/Time    Blood culture [[id number]] Collected: 06/20/22 1651    Order Status: Completed Specimen: Blood from Peripheral, Hand, Left Updated: 06/22/22 0951     Blood Culture, Routine Gram stain dudley bottle: Gram  positive cocci in clusters resembling Staph      Results called to and read back by:Tara López RN 06/21/2022  22:16    Blood culture [[id number]] Collected: 06/21/22 2250    Order Status: Completed Specimen: Blood from Peripheral, Wrist, Left Updated: 06/22/22 0715     Blood Culture, Routine No Growth to date    Blood culture [[id number]] Collected: 06/21/22 2250    Order Status: Completed Specimen: Blood from Peripheral, Wrist, Left Updated: 06/22/22 0715     Blood Culture, Routine No Growth to date            I have reviewed all pertinent labs within the past 24 hours.    Estimated Creatinine Clearance: 67.2 mL/min (A) (based on SCr of 1.8 mg/dL (H)).    Diagnostic Results:  I have reviewed and interpreted all pertinent imaging results/findings within the past 24 hours.

## 2022-06-22 NOTE — HPI
37-year-old male with history of NIDCM with BiV systolic heart failure on home milrinone presented 6/13/2022 for ADHF. Patient not current candidate for OHT as recently quit smoking 4/2022. IABP placed 6/13/2022, central line exchanged 6/20/2022. Blood culture x1 drawn on 6/20/2022, returned with GPC. Repeat blood cultures drawn 6/21/2022 x2. Patient reports tenderness around right femoral IABP - started once IABP was placed. Denied any worsening pain around right femoral line.    Called back 7/19 - LVAD placed 6/29, pt developed chills after unplugging his LVAD. Denies any abd pain. Family endorses some serosanguinous drainage from prior chest tube site. No drainage from DLES per patient. Denies fevers, and states he feels fine today. Empirically started on vanc and cefepime.

## 2022-06-22 NOTE — PROGRESS NOTES
"[facility] - Cardiac Intensive Care  Adult Nutrition  Progress Note    SUMMARY       Recommendations  1. Continue current diabetic diet-fluid per MD   2. When medically feasible, continue Boost glucose control TID   3. RD following    Goals: Continue to meet % EEN/EPN by RD f/u  Nutrition Goal Status: progressing towards goal  Communication of RD Recs: other (POC)    Assessment and Plan    Nutrition Problem  Increased protein needs      Related to (etiology):   Physiological values      Signs and Symptoms (as evidenced by):   LVAD w/u     Interventions (treatment strategy):  Collaboration of nutrition care w/ other providers      Nutrition Diagnosis Status:   New    Reason for Assessment    Reason For Assessment: RD follow-up  Diagnosis: HF  Relevant Medical History: CHF, DM, cardiomyopathy  Interdisciplinary Rounds: attended  General Information Comments: Spoke w/ pt at bedside. Pt reports a good appetite currently and consuming all of the food provided on his plate. Pt denies any issues chewing/swallowing at this time. No n/v/d/c. NFPE completed, no s/s of malnutrition at this time. LBM noted-6/20/22  Nutrition Discharge Planning: diabetic diet    Nutrition Risk Screen    Nutrition Risk Screen: no indicators present    Nutrition/Diet History    Spiritual, Cultural Beliefs, Baptist Practices, Values that Affect Care: no    Anthropometrics    Temp: 98.1 °F (36.7 °C)  Height Method: Stated  Height: 6' 3" (190.5 cm)  Height (inches): 75 in  Weight Method: Bed Scale  Weight: 96.2 kg (212 lb 1.3 oz)  Weight (lb): 212.08 lb  Ideal Body Weight (IBW), Male: 196 lb  % Ideal Body Weight, Male (lb): 109.69 %  BMI (Calculated): 26.5  BMI Grade: 25 - 29.9 - overweight       Lab/Procedures/Meds    Pertinent Labs Reviewed: reviewed  Pertinent Labs Comments: Sodium 132, BUN 32,  GFR 54.4, Glucose 210  Pertinent Medications Reviewed: reviewed  Pertinent Medications Comments: insulin, mirtazipine, senna docusate, " lasix    Estimated/Assessed Needs    Weight Used For Calorie Calculations: 96.2 kg (212 lb 1.3 oz)  Energy Calorie Requirements (kcal): 2169  Energy Need Method: Adrian-St Jeor (PAL 1.1)  Protein Requirements: 125 g (1.3 g/kg)  Weight Used For Protein Calculations: 96.2 kg (212 lb 1.3 oz)   RDA Method (mL): 2169  CHO Requirement: 271    Nutrition Prescription Ordered    Current Diet Order: Diabetic diet    Evaluation of Received Nutrient/Fluid Intake    I/O: -25.1 L since admit  Energy Calories Required: meeting needs  Protein Required: meeting needs  Fluid Required: meeting needs  Total Fluid Intake (mL/kg): 1 ml or fluid per MD  Tolerance: tolerating  % Intake of Estimated Energy Needs: %  % Meal Intake: %    Nutrition Risk    Level of Risk/Frequency of Follow-up: low (( 1x/week))     Monitor and Evaluation    Food and Nutrient Intake: energy intake, food and beverage intake  Food and Nutrient Adminstration: diet order  Knowledge/Beliefs/Attitudes: beliefs and attitudes, food and nutrition knowledge/skill  Physical Activity and Function: nutrition-related ADLs and IADLs, factors affecting access to physical activity  Anthropometric Measurements: height/length, weight, weight change, body mass index, growth pattern indices/percentile ranks  Biochemical Data, Medical Tests and Procedures: electrolyte and renal panel, gastrointestinal profile, glucose/endocrine profile, inflammatory profile, lipid profile  Nutrition-Focused Physical Findings: overall appearance, extremities, muscles and bones, head and eyes, skin     Nutrition Follow-Up    RD Follow-up?: Yes   By Samantha IVY

## 2022-06-22 NOTE — NURSING
Progress Note  Critical Care    Admit Date: 6/13/2022   LOS: 9 days      -Vanc started today  -CVPs 14, 13, 13  -SVO2-50  -K returned at 3.6. Per Dr. Sanderson, verbal order for 50meq potassium PO entered  -Lab called to report positive blood cultures  6/21/22 at 2250 gram positive cocci      Continuous Infusions:   sodium chloride 0.9% Stopped (06/18/22 0017)    EPINEPHrine 0.02 mcg/kg/min (06/22/22 1400)    furosemide (LASIX) 10 mg/mL infusion (non-titrating) 30 mg/hr (06/22/22 1400)    heparin (porcine) in D5W 24 Units/kg/hr (06/22/22 1400)    milrinone 20mg/100ml D5W (200mcg/ml) 0.375 mcg/kg/min (06/22/22 1400)    nitric oxide gas       Scheduled Meds:   acetaminophen  650 mg Oral Q8H    aspirin  81 mg Oral Daily    busPIRone  7.5 mg Oral Q12H    gabapentin  300 mg Oral TID    HYDROcodone-acetaminophen  1 tablet Oral Q8H    insulin detemir U-100  20 Units Subcutaneous Daily    magnesium oxide  400 mg Oral BID    methocarbamoL  500 mg Oral QID    mirtazapine  15 mg Oral Nightly    pantoprazole  40 mg Oral Daily    polyethylene glycol  17 g Oral BID    potassium chloride  30 mEq Oral BID    senna-docusate 8.6-50 mg  1 tablet Oral Daily    sucralfate  1 g Oral Nightly    [START ON 6/23/2022] vancomycin (VANCOCIN) IVPB  1,000 mg Intravenous Q12H     PRN Meds:sodium chloride, sodium chloride, sodium chloride, ALPRAZolam, bisacodyL, dextrose 10%, dextrose 10%, glucagon (human recombinant), insulin aspart U-100, magnesium oxide, magnesium oxide, mupirocin, potassium bicarbonate, potassium bicarbonate, potassium bicarbonate, potassium, sodium phosphates, potassium, sodium phosphates, potassium, sodium phosphates, promethazine, sodium chloride 0.9%, Pharmacy to dose Vancomycin consult **AND** vancomycin - pharmacy to dose    Review of patient's allergies indicates:   Allergen Reactions    Aspirin Other (See Comments)     Mr. Thacker is enrolled in Dr. Paige's Alon Trial and cannot have any aspirin  and/or aspirin-containing products. DO NOT cancel any orders for INV Aspirin 100 mg/Placebo. If you have questions, please contact Isabel @ [phone], [phone],[email or web address], secure chat or MS Teams message.    Bumex [bumetanide] Hives    Lactose Diarrhea     Other reaction(s): Abdominal distension, gaseous    Torsemide Hives       OBJECTIVE:     Vital Signs (Most Recent)  Temp: 98.1 °F (36.7 °C) (06/22/22 1100)  Pulse: 109 (06/22/22 1400)  Resp: 18 (06/22/22 1400)  BP: 125/76 (06/22/22 1400)  SpO2: 98 % (06/22/22 1400)    Vital Signs Range (Last 24H):  Temp:  [98 °F (36.7 °C)-98.6 °F (37 °C)]   Pulse:  [105-122]   Resp:  [14-32]   BP: ()/(56-86)   SpO2:  [92 %-99 %]     I & O (Last 24H):    Intake/Output Summary (Last 24 hours) at 6/22/2022 1707  Last data filed at 6/22/2022 1400  Gross per 24 hour   Intake 1465.88 ml   Output 4880 ml   Net -3414.12 ml     Ventilator Data (Last 24H):     Resp Rate Total:  [19 br/min-21 br/min] 21 br/min    Physical Exam:  See flowsheet    Lines/Drains:  Percutaneous Central Line Insertion/Assessment - Triple Lumen  06/20/22 1458 left internal jugular (Active)   Line Necessity Review Frequent Blood Draws;Hemodynamic instability;Incompatible infusions;Large/frequent boluses;Longterm central access required;Medication caustic to vasculature;Poor venous access 06/20/22 2000   Verification by X-ray Yes 06/22/22 0400   Site Assessment No drainage;No redness;No swelling;No warmth 06/22/22 0400   Line Securement Device Secured with sutures 06/22/22 0400   Dressing Type Biopatch in place;Central line dressing;Transparent (Tegaderm) 06/22/22 0400   Dressing Status Clean;Dry;Intact 06/22/22 0400   Dressing Intervention Integrity maintained 06/22/22 0400   Date on Dressing 06/20/22 06/22/22 0400   Dressing Due to be Changed 06/27/22 06/22/22 0400   Distal Patency/Care flushed w/o difficulty;blood return present 06/22/22 0400   Medial 1 Patency/Care infusing 06/22/22 0400    Proximal 1 Patency/Care infusing 06/22/22 0400   Waveform Normal 06/22/22 0400   Number of days: 2            IABP 06/13/22 2229 8.0 Fr. 40 mL (Active)   Site Assessment Clean;Dry;Intact 06/22/22 1400   Helium Tubing Clear 06/22/22 1400   Arterial Line Status Arterial fluids per protocol 06/22/22 1400   Arterial Line Interventions Zeroed and calibrated 06/22/22 1400   Positioning HOB up 15 degrees 06/22/22 1400   Dressing Occlusive 06/22/22 1400   Dressing Status Clean;Dry;Intact 06/22/22 1400   Dressing Intervention Integrity maintained 06/22/22 1400   Dressing Change Due 06/28/22 06/22/22 1400   Color/Movement/Sensation Capillary refill less than 3 sec 06/22/22 1400   Number of days: 8       Male External Urinary Catheter 06/14/22 0000 (Active)   Collection Container Urimeter 06/22/22 0400   Securement Method secured to top of thigh w/ adhesive device 06/22/22 0400   Skin no redness;no breakdown 06/22/22 0400   Tolerance no signs/symptoms of discomfort 06/22/22 0400   Output (mL) 250 mL 06/22/22 1400   Catheter Change Date 06/21/22 06/22/22 0400   Catheter Change Time 2000 06/22/22 0400   Number of days: 8       Laboratory (Last 24H):  CBC:  Recent Labs   Lab 06/22/22  1347   WBC 12.11   HGB 10.0*   HCT 29.6*        CMP:  Recent Labs   Lab 06/22/22  0344 06/22/22  1347   CALCIUM 9.8 9.7   ALBUMIN 3.4*  --    PROT 8.0  --    * 130*   K 3.6 3.6   CO2 30* 33*   CL 89* 87*   BUN 32* 37*   CREATININE 1.8* 1.8*   ALKPHOS 113  --    ALT 18  --    AST 24  --    BILITOT 1.3*  --            Arnulfo Chou RN  6/22/2022  5:07 PM

## 2022-06-22 NOTE — PROGRESS NOTES
[facility] - Cardiac Intensive Care  Heart Transplant  Progress Note    Patient Name: Kevan Queen  MRN: [id number]  Admission Date: 6/13/2022  Hospital Length of Stay: 9 days  Attending Physician: Luca Lopez Jr.,*  Primary Care Provider: ORALIA Cline  Principal Problem:Acute on chronic combined systolic and diastolic heart failure, NYHA class 4    Subjective:     **Interval History: Lasix drip decreased to 20mg/hr yesterday afternoon but was increased back to 30mg/hr overnight.  Blood cultures from 6/20 (one bottle) positive for gram + cocci.  Repeats from 6/21 negative thus far.  However, WBC mildly elevated at 12.93 today.  Stat ID consult for clearance given anticipated LVAD implant tomorrow 6/23.  Patient is negative 2.4L in the last 24 hours.  CVP: 14, SVO2: 50, CO: 5.37, CI: 2.37, SVR: 1162.  Creatinine is 1.8 today up from 1.5 yesterday.  Will continue current dose of Lasix and get repeat labs at 1300.     Lines:  IABP 6/16  Left IJ: 6/20    Continuous Infusions:   sodium chloride 0.9% Stopped (06/18/22 0017)    EPINEPHrine 0.02 mcg/kg/min (06/22/22 1000)    furosemide (LASIX) 10 mg/mL infusion (non-titrating) 30 mg/hr (06/22/22 1000)    heparin (porcine) in D5W Stopped (06/22/22 0340)    milrinone 20mg/100ml D5W (200mcg/ml) 0.375 mcg/kg/min (06/22/22 1000)    nitric oxide gas       Scheduled Meds:   acetaminophen  650 mg Oral Q8H    aspirin  81 mg Oral Daily    busPIRone  7.5 mg Oral Q12H    gabapentin  300 mg Oral TID    HYDROcodone-acetaminophen  1 tablet Oral Q8H    insulin detemir U-100  20 Units Subcutaneous Daily    magnesium oxide  400 mg Oral BID    methocarbamoL  500 mg Oral QID    mirtazapine  15 mg Oral Nightly    pantoprazole  40 mg Oral Daily    polyethylene glycol  17 g Oral BID    potassium chloride  30 mEq Oral BID    senna-docusate 8.6-50 mg  1 tablet Oral Daily    sucralfate  1 g Oral Nightly     PRN Meds:sodium chloride, sodium chloride, sodium chloride,  ALPRAZolam, bisacodyL, dextrose 10%, dextrose 10%, glucagon (human recombinant), insulin aspart U-100, magnesium oxide, magnesium oxide, mupirocin, potassium bicarbonate, potassium bicarbonate, potassium bicarbonate, potassium, sodium phosphates, potassium, sodium phosphates, potassium, sodium phosphates, promethazine, sodium chloride 0.9%    Review of patient's allergies indicates:   Allergen Reactions    Aspirin Other (See Comments)     Mr. Thacker is enrolled in Dr. Paige's Alon Trial and cannot have any aspirin and/or aspirin-containing products. DO NOT cancel any orders for INV Aspirin 100 mg/Placebo. If you have questions, please contact Isabel @ [phone], [phone],[email or web address], secure chat or MS Teams message.    Bumex [bumetanide] Hives    Lactose Diarrhea     Other reaction(s): Abdominal distension, gaseous    Torsemide Hives     Objective:     Vital Signs (Most Recent):  Temp: 98.1 °F (36.7 °C) (06/22/22 0715)  Pulse: 108 (06/22/22 1000)  Resp: 18 (06/22/22 1000)  BP: 103/72 (06/22/22 1000)  SpO2: 99 % (06/22/22 1000)   Vital Signs (24h Range):  Temp:  [98 °F (36.7 °C)-98.6 °F (37 °C)] 98.1 °F (36.7 °C)  Pulse:  [105-125] 108  Resp:  [14-47] 18  SpO2:  [92 %-100 %] 99 %  BP: ()/(56-81) 103/72     Patient Vitals for the past 72 hrs (Last 3 readings):   Weight   06/22/22 0300 96.2 kg (212 lb 1.3 oz)   06/21/22 0900 97.5 kg (215 lb)   06/21/22 0612 97.7 kg (215 lb 6.2 oz)       Body mass index is 26.51 kg/m².      Intake/Output Summary (Last 24 hours) at 6/22/2022 1110  Last data filed at 6/22/2022 1000  Gross per 24 hour   Intake 2253.21 ml   Output 5380 ml   Net -3126.79 ml         Hemodynamic Parameters:       Telemetry: ST    Physical Exam  Constitutional:       Appearance: Normal appearance.   HENT:      Head: Normocephalic and atraumatic.   Eyes:      Conjunctiva/sclera: Conjunctivae normal.      Pupils: Pupils are equal, round, and reactive to light.   Neck:      Comments: APOORVA  TLC  Cardiovascular:      Rate and Rhythm: Regular rhythm. Tachycardia present.      Comments: +S3  Pulmonary:      Effort: Pulmonary effort is normal.      Breath sounds: Normal breath sounds.   Abdominal:      General: Bowel sounds are normal. There is distension.   Musculoskeletal:         General: No swelling. Normal range of motion.      Cervical back: Neck supple.   Skin:     General: Skin is warm and dry.      Capillary Refill: Capillary refill takes 2 to 3 seconds.   Neurological:      General: No focal deficit present.      Mental Status: He is alert and oriented to person, place, and time.   Psychiatric:         Mood and Affect: Mood normal.         Behavior: Behavior normal.         Thought Content: Thought content normal.         Judgment: Judgment normal.       Significant Labs:  CBC:  Recent Labs   Lab 06/20/22  0352 06/21/22  0418 06/22/22  0344   WBC 9.60 11.25 12.93*   RBC 3.46* 3.47* 3.46*   HGB 10.5* 10.0* 10.1*   HCT 31.2* 30.8* 30.3*    323 350   MCV 90 89 88   MCH 30.3 28.8 29.2   MCHC 33.7 32.5 33.3       BNP:  Recent Labs   Lab 06/17/22  0332   *       CMP:  Recent Labs   Lab 06/20/22  0352 06/20/22  1428 06/21/22  0418 06/21/22  1459 06/21/22  1903 06/22/22  0344   *   < > 208* 237* 203* 210*   CALCIUM 9.9   < > 9.7 9.5 9.4 9.8   ALBUMIN 3.2*  --  3.3*  --   --  3.4*   PROT 7.4  --  7.7  --   --  8.0   *   < > 134* 132* 132* 132*   K 4.5   < > 3.9 4.2 4.0 3.6   CO2 29   < > 29 29 29 30*   CL 97   < > 94* 92* 90* 89*   BUN 24*   < > 25* 30* 30* 32*   CREATININE 1.5*   < > 1.5* 1.7* 1.5* 1.8*   ALKPHOS 102  --  106  --   --  113   ALT 14  --  22  --   --  18   AST 22  --  26  --   --  24   BILITOT 0.6  --  0.8  --   --  1.3*    < > = values in this interval not displayed.        Coagulation:   Recent Labs   Lab 06/20/22  1607 06/21/22  0418 06/21/22  1154 06/21/22  1903 06/22/22  0344   INR 1.0  --   --   --   --    APTT  --    < > 54.7* 46.9* 46.1*    < > = values  "in this interval not displayed.       LDH:  No results for input(s): LDH in the last 72 hours.  Microbiology:  Microbiology Results (last 7 days)       Procedure Component Value Units Date/Time    Blood culture [[id number]] Collected: 06/20/22 1651    Order Status: Completed Specimen: Blood from Peripheral, Hand, Left Updated: 06/22/22 0951     Blood Culture, Routine Gram stain dudley bottle: Gram positive cocci in clusters resembling Staph      Results called to and read back by:Tara López RN 06/21/2022  22:16    Blood culture [[id number]] Collected: 06/21/22 2250    Order Status: Completed Specimen: Blood from Peripheral, Wrist, Left Updated: 06/22/22 0715     Blood Culture, Routine No Growth to date    Blood culture [[id number]] Collected: 06/21/22 2250    Order Status: Completed Specimen: Blood from Peripheral, Wrist, Left Updated: 06/22/22 0715     Blood Culture, Routine No Growth to date            I have reviewed all pertinent labs within the past 24 hours.    Estimated Creatinine Clearance: 67.2 mL/min (A) (based on SCr of 1.8 mg/dL (H)).    Diagnostic Results:  I have reviewed and interpreted all pertinent imaging results/findings within the past 24 hours.    Assessment and Plan:     36 yo male with NIDCM with BiV systolic heart failure, on home Milrinone at 0.375 mcg/kg/min, presented at [facility] 4/2/22 ,was not evaluated for OHT as he has recently quit smoking in April 2022 but was approved for VAD with plan to begin OHT evaluation in upcoming months if Mr Queen is stable and suitable for OHT eval (blood group A), 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 Went to ED at [facility] " "6/10 but left after waiting 4 hours. Had clinic appointment with us today, but arrived to [facility] early this morning and decided to go to the ED instead. Baseline Lasix dose is 80 mg bid. Reports taking 240 mg qd past 3 days with no improvement. BNP is 1701, up from 898 on 6/2 and 49 on 5/24. sCr is 1.8 with baseline ~ 1.5-1.7. sPO2 on RA is 93%. Wife at bedside    He has been given Lasix 80 mg IVP in the ED with plan to start Lasix gtt at 20 mg/hr           * Acute on chronic combined systolic and diastolic heart failure, NYHA class 4  -[facility]  -Approved for LVAD at [facility] on 4/2/22. Was not evaluated for OHTx as he quit smoking in April 2022  -Moved to ICU 6/13 in the setting of significant volume overloaded and low output state. IABP placed 6/13 and Epi and Marcella added for RV support  -Lasix gtt D/C"d 6/17 2/2 increase in sCr. Given 250 cc IV NS and sCr improved  -lasix drip restarted 6/23 at 40 mg/hr and now at 30mg/hr. CVP: 14, SVO 50, CO: 5.37, CI:2.37, SVR: 1162.  Will continue current regimen and repeat labs at 1300  -IABP 1:1, IV milrinone 0.375, epi 0.02, and Marcella 10 ppm.   -TTE 6/14: LVEF 10%, G3DD, mild-moderate reduced RVSF, moderate-severe MR, moderate-sever tr, CVP 15, PASP 56, moderate PH, LVEDD 7.59  -Repeat Echo 6/21 EF: 15%, LVEDD: 7.14, TAPSE: 1.29 severe MR, mod TR. Mild RVE with mod reduced RV failure   -RHC done 4/19/22 on Milrinone 0.5 mcg/kg/min: RA 11, PA 50/27 (35), W 27, CO/CI 3.7/1.7, PVR 2.2 and SVR 1535   -GDMT: Entresto and Aldactone on hold since admit 5/24-5/27 2/2 elevated sCr  -Reports dry weight is 217#, and that he had gained 6# on his home scale PTA  - VAD implant today. Unlikely OHTx candidate with smoking history (quit April 2022)           Positive blood culture  -Blood cultures drawn 6/20 positive for gram + cocci  -PICC line removed and IJ exchanged on 6/20  -Repeat cultures from 6/21 negative thus far  -WBC mildly elevated today at 12.93  -patient has been afebrile  -ID " consulted given upcoming planned LVAD implant tomorrow     Cardiogenic shock  -See A/C CHF    CKD (chronic kidney disease) stage 3, GFR 30-59 ml/min  -Admit sCr 1.8, baseline ~ 1.5-1.7  -creatinine elevated to 2.0 but improved after fluid.    -creatinine has been stable and is 1.8 today  -Will monitor response    Muscle cramping  -decreased scheduled Percocet and starting Gabapentin    Uncontrolled diabetes mellitus  -Admitted 5/24-5/27 with hyperglycemia hyperosmolar syndrome  -Hgb A1C 9.2 on 5/20/22  -Glucose per labs today 265  -Endocrine consulted for better control    Uninterrupted Critical Care/Counseling Time (not including procedures): 60 minutes      DEYA Martinez  Heart Transplant  [facility] - Cardiac Intensive Care

## 2022-06-22 NOTE — CONSULTS
[facility]  Infectious Disease  Consult Note    Patient Name: Kevan Queen  MRN: [id number]  Admission Date: 6/13/2022  Hospital Length of Stay: 9 days  Attending Physician: Luca Lopez Jr.,*  Primary Care Provider: ORALIA Cline     Isolation Status: No active isolations    Patient information was obtained from patient and past medical records.      Inpatient consult to Infectious Diseases  Consult performed by: Kavya Jeffrey MD  Consult ordered by: DEYA Martinez        Assessment/Plan:     Positive blood culture  37-year-old male with history of NIDCM with BiV systolic heart failure on home milrinone presented 6/13/2022 for ADHF, IABP placed 6/13/2022, now with 1/1 set with GPC, repeat blood cultures x2 thus far with NGTD.    Recommendations:  - Start vancomycin IV, goal 15-20  - Follow-up GPC ID and sensitivity  - Follow-up repeat blood cultures from 6/22/2022   - Consider exchanging IABP  - Ideally hold off LVAD until 6/22/2022 cultures with no growth for 72 hours        Thank you for your consult. I will follow-up with patient. Please contact us if you have any additional questions.    Kavya Jeffrey MD  Infectious Disease  [facility]    Subjective:     Principal Problem: Acute on chronic combined systolic and diastolic heart failure, NYHA class 4    HPI: 37-year-old male with history of NIDCM with BiV systolic heart failure on home milrinone presented 6/13/2022 for ADHF. Patient not current candidate for OHT as recently quit smoking 4/2022. IABP placed 6/13/2022, central line exchanged 6/20/2022. Blood culture x1 drawn on 6/20/2022, returned with GPC. Repeat blood cultures drawn 6/21/2022 x2. Patient reports tenderness around right femoral IABP - started once IABP was placed. Denied any worsening pain around right femoral line.      Past Medical History:   Diagnosis Date    Arthritis     Cardiomyopathy     CHF (congestive heart failure)  10/01/2020    Diabetes mellitus     Dilated cardiomyopathy 10/26/2020    Drug abuse 10/2020    Hyperosmolar hyperglycemic state (HHS) 5/25/2022    ICD (implantable cardioverter-defibrillator) in place 10/26/2020    Renal disorder        Past Surgical History:   Procedure Laterality Date    CARDIAC DEFIBRILLATOR PLACEMENT      RIGHT HEART CATHETERIZATION Right 4/8/2022    Procedure: INSERTION, CATHETER, RIGHT HEART;  Surgeon: Luca Lopez Jr., MD;  Location: [facility] CATH LAB;  Service: Cardiology;  Laterality: Right;    RIGHT HEART CATHETERIZATION Right 4/19/2022    Procedure: INSERTION, CATHETER, RIGHT HEART;  Surgeon: Josh Pulido MD;  Location: [facility] CATH LAB;  Service: Cardiology;  Laterality: Right;       Review of patient's allergies indicates:   Allergen Reactions    Aspirin Other (See Comments)     Mr. Thacker is enrolled in Dr. Paige's Alon Trial and cannot have any aspirin and/or aspirin-containing products. DO NOT cancel any orders for INV Aspirin 100 mg/Placebo. If you have questions, please contact Isabel @ [phone], [phone],[email or web address], secure chat or MS Teams message.    Bumex [bumetanide] Hives    Lactose Diarrhea     Other reaction(s): Abdominal distension, gaseous    Torsemide Hives       Medications:  Medications Prior to Admission   Medication Sig    busPIRone (BUSPAR) 7.5 MG tablet Take 7.5 mg by mouth every 12 (twelve) hours.    EScitalopram oxalate (LEXAPRO) 5 MG Tab Take 1 tablet (5 mg total) by mouth once daily.    furosemide (LASIX) 80 MG tablet Take 80 mg by mouth 2 (two) times daily.    insulin aspart U-100 (NOVOLOG) 100 unit/mL (3 mL) InPn pen Inject 12 Units into the skin 3 (three) times daily.    insulin detemir U-100 (LEVEMIR FLEXTOUCH) 100 unit/mL (3 mL) SubQ InPn pen Inject 23 Units into the skin once daily.    mirtazapine (REMERON) 15 MG tablet Take 15 mg by mouth nightly.    pantoprazole (PROTONIX) 40 MG tablet Take 1 tablet (40 mg  "total) by mouth once daily.    potassium chloride SA (K-DUR,KLOR-CON) 20 MEQ tablet Take 2 tablets (40 mEq total) by mouth once daily.    sucralfate (CARAFATE) 1 gram tablet Take 1 g by mouth nightly.    albuterol (PROVENTIL/VENTOLIN HFA) 90 mcg/actuation inhaler INHALE 2 PUFFS BY MOUTH EVERY 4 HOURS AS NEEDED FOR COUGH OR WHEEZING    blood sugar diagnostic Strp Use to test blood glucose 4 (four) times daily.    blood-glucose meter Misc Use as instructed    lancets 33 gauge Misc Use to test blood glucose 4 (four) times daily.    milrinone (PRIMACOR) 1 mg/mL injection Inject into the vein.    milrinone 20mg/100ml D5W, 200mcg/ml, (PRIMACOR) 20 mg/100 mL (200 mcg/mL) infusion Inject 34.875 mcg/min into the vein continuous.    pen needle, diabetic 31 gauge x 1/4" Ndle 1 Device by Misc.(Non-Drug; Combo Route) route 4 (four) times daily.    promethazine (PHENERGAN) 12.5 MG Tab Take 1 tablet (12.5 mg total) by mouth every 6 (six) hours as needed (nausea).    traMADoL (ULTRAM) 50 mg tablet Take 1 tablet (50 mg total) by mouth every 6 (six) hours as needed for Pain.     Antibiotics (From admission, onward)                Start     Stop Route Frequency Ordered    06/21/22 0445  vancomycin 1.25 g in dextrose 5% 250 mL IVPB (ready to mix)  (vancomycin IVPB)         06/21 1644 IV Once pre-op 06/21/22 0436    06/21/22 0445  cefepime in dextrose 5 % IVPB 2 g         06/21 1644 IV Once pre-op 06/21/22 0436    06/21/22 0435  mupirocin 2 % ointment         -- [facility] On Call Procedure 06/21/22 0436          Antifungals (From admission, onward)                None          Antivirals (From admission, onward)      None             There is no immunization history for the selected administration types on file for this patient.    Family History       Problem Relation (Age of Onset)    Diverticulosis Brother    Heart attack Maternal Grandmother, Maternal Grandfather    Heart failure Father          Social History "     Socioeconomic History    Marital status: Legally    Tobacco Use    Smoking status: Former Smoker     Packs/day: 0.50     Years: 16.00     Pack years: 8.00     Start date: 10/1/2004     Quit date: 2021     Years since quittin.1    Smokeless tobacco: Never Used   Substance and Sexual Activity    Alcohol use: Not Currently    Drug use: Not Currently     Types: Marijuana, MDMA (Ecstacy)    Sexual activity: Yes     Partners: Female     Birth control/protection: None     Review of Systems   Constitutional:  Negative for chills, diaphoresis and fever.   HENT:  Negative for rhinorrhea and sore throat.    Respiratory:  Negative for cough and shortness of breath.    Cardiovascular:  Negative for chest pain and leg swelling.   Gastrointestinal:  Negative for abdominal pain, diarrhea, nausea and vomiting.   Genitourinary:  Negative for dysuria and hematuria.   Musculoskeletal:  Negative for arthralgias and myalgias.   Skin:  Negative for rash.        Tenderness around right femoral line   Neurological:  Negative for headaches.   Objective:     Vital Signs (Most Recent):  Temp: 98.1 °F (36.7 °C) (22 1100)  Pulse: 108 (22 1000)  Resp: 18 (22 1000)  BP: 103/72 (22 1000)  SpO2: 99 % (22 1000) Vital Signs (24h Range):  Temp:  [98 °F (36.7 °C)-98.6 °F (37 °C)] 98.1 °F (36.7 °C)  Pulse:  [105-125] 108  Resp:  [14-34] 18  SpO2:  [92 %-99 %] 99 %  BP: ()/(56-81) 103/72     Weight: 96.2 kg (212 lb 1.3 oz)  Body mass index is 26.51 kg/m².    Estimated Creatinine Clearance: 67.2 mL/min (A) (based on SCr of 1.8 mg/dL (H)).    Physical Exam  Vitals reviewed.   Constitutional:       General: He is not in acute distress.     Appearance: He is well-developed. He is not diaphoretic.   HENT:      Head: Normocephalic and atraumatic.   Eyes:      Conjunctiva/sclera: Conjunctivae normal.   Neck:      Comments: LIJ line  Pulmonary:      Effort: Pulmonary effort is normal. No respiratory  distress.   Abdominal:      General: There is no distension.      Palpations: Abdomen is soft.   Musculoskeletal:         General: Normal range of motion.   Skin:     General: Skin is warm and dry.      Findings: No erythema or rash.      Comments: Right fem line site, tenderness to palpation, but no redness, swelling, warmth, drainage     Neurological:      Mental Status: He is alert and oriented to person, place, and time.   Psychiatric:         Behavior: Behavior normal.       Significant Labs: All pertinent labs within the past 24 hours have been reviewed.    Significant Imaging: I have reviewed all pertinent imaging results/findings within the past 24 hours.

## 2022-06-22 NOTE — PROGRESS NOTES
"[facility] - [facility]  Endocrinology  Progress Note    Admit Date: 6/13/2022     Reason for Consult: Management of  type 2 DM, Hyperglycemia     Surgical Procedure and Date: none    Diabetes diagnosis year: 4/21/21    Home Diabetes Medications:  Detemir  23  units daily and Aspart   12  units TIDWM and  Mod dose correction insulin    Lab Results   Component Value Date    HGBA1C 9.2 (H) 05/20/2022           How often checking glucose at home? 1-3 x day   BG readings on regimen: 180- up to 300 once  Hypoglycemia on the regimen?  yes once- related to not really eating after taking aspart   Missed doses on regimen?  no    Diabetes Complications include:     Hyperglycemia    Complicating diabetes co morbidities:   History of MI, CHF, and CKD      HPI:   Patient is a 37 y.o. male with a diagnosis of type 2 DM and NIDCM with BiV systolic heart failure. Patient was presented at [facility] 4/2/22  and was  approved for VAD. Also recently admitted with [facility]; he had clinic appointment last week to f/u recent admit for hyperglycemic hyperosmolar syndrome but did not come as he was "feeling too bad" presents to  ED with SOB. Endocrinology consulted for BG/ DM management.                Interval HPI:   Overnight events: No acute events overnight. Of note, BG checks q4hr and SSI not done overnight, Patient on the CICU in room [id number] A. Blood glucose stable. BG at and above goal on current insulin regimen (SSI + basal). Steroid use- None. * Surgery Date in Future *  Renal function- Abnormal - Creatinine 1.8   Vasopressors-  Epinephrine       Diet NPO     Eating:   NPO  Nausea: No  Hypoglycemia and intervention: No  Fever: No  TPN and/or TF: No    /75 (BP Location: Left arm, Patient Position: Lying)   Pulse 107   Temp 98.6 °F (37 °C) (Oral)   Resp 19   Ht 6' 3" (1.905 m)   Wt 96.2 kg (212 lb 1.3 oz)   SpO2 96%   BMI 26.51 kg/m²     Labs Reviewed and Include    Recent Labs   Lab 06/22/22  0344   GLU " 210*   CALCIUM 9.8   ALBUMIN 3.4*   PROT 8.0   *   K 3.6   CO2 30*   CL 89*   BUN 32*   CREATININE 1.8*   ALKPHOS 113   ALT 18   AST 24   BILITOT 1.3*     Lab Results   Component Value Date    WBC 12.93 (H) 06/22/2022    HGB 10.1 (L) 06/22/2022    HCT 30.3 (L) 06/22/2022    MCV 88 06/22/2022     06/22/2022     No results for input(s): TSH, FREET4 in the last 168 hours.  Lab Results   Component Value Date    HGBA1C 9.2 (H) 05/20/2022       Nutritional status:   Body mass index is 26.51 kg/m².  Lab Results   Component Value Date    ALBUMIN 3.4 (L) 06/22/2022    ALBUMIN 3.3 (L) 06/21/2022    ALBUMIN 3.2 (L) 06/20/2022     Lab Results   Component Value Date    PREALBUMIN 36 04/18/2022       Estimated Creatinine Clearance: 67.2 mL/min (A) (based on SCr of 1.8 mg/dL (H)).    Accu-Checks  Recent Labs     06/19/22  1721 06/19/22  2231 06/20/22  0816 06/20/22  1104 06/20/22  1702 06/20/22  2144 06/21/22  0744 06/21/22  1057 06/21/22  1636 06/21/22  2155   POCTGLUCOSE 251* 285* 260* 232* 166* 259* 227* 201* 295* 182*       Current Medications and/or Treatments Impacting Glycemic Control  Immunotherapy:    Immunosuppressants       None          Steroids:   Hormones (From admission, onward)                None          Pressors:    Autonomic Drugs (From admission, onward)                Start     Stop Route Frequency Ordered    06/13/22 2130  EPINEPHrine (ADRENALIN) 5 mg in dextrose 5 % 250 mL infusion         -- IV Continuous 06/13/22 2024          Hyperglycemia/Diabetes Medications:   Antihyperglycemics (From admission, onward)                Start     Stop Route Frequency Ordered    06/22/22 0900  insulin detemir U-100 pen 20 Units         -- SubQ Daily 06/21/22 1317    06/21/22 1416  insulin aspart U-100 pen 1-10 Units         -- SubQ Every 4 hours PRN 06/21/22 1032            ASSESSMENT and PLAN    * Acute on chronic combined systolic and diastolic heart failure, NYHA class 4  Optimize glucose control and   avoid hypoglycemia    Managed per primary.       Uncontrolled diabetes mellitus  Endocrinology consulted for BG management.   BG goal 140-180    - Levemir Flex Pen 20 units daily  - Novolog (aspart) insulin prn for BG excursions MDC SSI (150/25).   - BG checks q4hr  - Hypoglycemia protocol in place    - Will likely require IIP s/p LVAD  ** Please notify Endocrine for any change and/or advance in diet**  ** Please call Endocrine for any BG related issues **    Discharge Planning:   TBD. Please notify endocrinology prior to discharge.        CKD (chronic kidney disease) stage 3, GFR 30-59 ml/min    Titrate insulin slowly to avoid hypoglycemia as the risk of hypoglycemia increases with decreased creatinine clearance.    Estimated Creatinine Clearance: 67.2 mL/min (A) (based on SCr of 1.8 mg/dL (H)).      Advanced care planning/counseling discussion             Michael Wyman, DNP, FNP  Endocrinology  [facility] - [facility]

## 2022-06-22 NOTE — ASSESSMENT & PLAN NOTE
37-year-old male with history of NIDCM with BiV systolic heart failure on home milrinone presented 6/13/2022 for ADHF, IABP placed 6/13/2022, now with 1/1 set with GPC, repeat blood cultures x2 thus far with NGTD.    Recommendations:  - Start vancomycin IV, goal 15-20  - Follow-up GPC ID and sensitivity  - Follow-up repeat blood cultures from 6/22/2022   - Consider exchanging IABP  - Ideally hold off LVAD until 6/22/2022 cultures with no growth for 72 hours

## 2022-06-22 NOTE — PROGRESS NOTES
Pharmacokinetic Initial Assessment: IV Vancomycin    Assessment/Plan:    Initiate intravenous vancomycin with loading dose of 1500mg once followed by a maintenance dose of vancomycin 1000mg IV every 12 hours  Desired empiric serum trough concentration is 15 to 20 mcg/mL  Draw vancomycin trough level 60 min prior to fourth dose on 6/24 at approximately 0100  Pharmacy will continue to follow and monitor vancomycin.      Please contact pharmacy at extension [phone]   with any questions regarding this assessment.     Thank you for the consult,   Moy Gonzalez       Patient brief summary:  Kevan Queen is a 37 y.o. male initiated on antimicrobial therapy with IV Vancomycin for treatment of suspected bacteremia    Drug Allergies:   Review of patient's allergies indicates:   Allergen Reactions    Aspirin Other (See Comments)     Mr. Thacker is enrolled in Dr. Paige's Alon Trial and cannot have any aspirin and/or aspirin-containing products. DO NOT cancel any orders for INV Aspirin 100 mg/Placebo. If you have questions, please contact Isabel @ [phone], [phone],[email or web address] game [email or web address], secure chat or MS Teams message.    Bumex [bumetanide] Hives    Lactose Diarrhea     Other reaction(s): Abdominal distension, gaseous    Torsemide Hives       Actual Body Weight:   96.2 kg    Renal Function:   Estimated Creatinine Clearance: 67.2 mL/min (A) (based on SCr of 1.8 mg/dL (H)).,     Dialysis Method (if applicable):  N/A    CBC (last 72 hours):  Recent Labs   Lab Result Units 06/20/22  0352 06/21/22  0418 06/22/22  0344   WBC K/uL 9.60 11.25 12.93*   Hemoglobin g/dL 10.5* 10.0* 10.1*   Hematocrit % 31.2* 30.8* 30.3*   Platelets K/uL 300 323 350   Gran % % 72.5 71.8 80.7*   Lymph % % 18.1 19.5 10.5*   Mono % % 7.9 7.5 8.0   Eosinophil % % 0.9 0.4 0.2   Basophil % % 0.4 0.4 0.3   Differential Method  Automated Automated Automated       Metabolic Panel (last 72 hours):  Recent Labs   Lab Result Units 06/19/22  1511 06/20/22  0352  06/20/22  1428 06/21/22  0418 06/21/22  1459 06/21/22  1903 06/22/22  0344   Sodium mmol/L 134* 133* 134* 134* 132* 132* 132*   Potassium mmol/L 4.1 4.5 3.9 3.9 4.2 4.0 3.6   Chloride mmol/L 95 97 95 94* 92* 90* 89*   CO2 mmol/L 27 29 30* 29 29 29 30*   Glucose mg/dL 222* 265* 169* 208* 237* 203* 210*   BUN mg/dL 26* 24* 23* 25* 30* 30* 32*   Creatinine mg/dL 1.4 1.5* 1.3 1.5* 1.7* 1.5* 1.8*   Albumin g/dL  --  3.2*  --  3.3*  --   --  3.4*   Total Bilirubin mg/dL  --  0.6  --  0.8  --   --  1.3*   Alkaline Phosphatase U/L  --  102  --  106  --   --  113   AST U/L  --  22  --  26  --   --  24   ALT U/L  --  14  --  22  --   --  18   Magnesium mg/dL  --  2.1  --  2.0 1.9  --  1.8       Drug levels (last 3 results):  No results for input(s): VANCOMYCINRA, VANCORANDOM, VANCOMYCINPE, VANCOPEAK, VANCOMYCINTR, VANCOTROUGH in the last 72 hours.    Microbiologic Results:  Microbiology Results (last 7 days)     Procedure Component Value Units Date/Time    Blood culture [[id number]] Collected: 06/20/22 1651    Order Status: Completed Specimen: Blood from Peripheral, Hand, Left Updated: 06/22/22 0951     Blood Culture, Routine Gram stain dudley bottle: Gram positive cocci in clusters resembling Staph      Results called to and read back by:Tara López RN 06/21/2022  22:16    Blood culture [[id number]] Collected: 06/21/22 2250    Order Status: Completed Specimen: Blood from Peripheral, Wrist, Left Updated: 06/22/22 0715     Blood Culture, Routine No Growth to date    Blood culture [[id number]] Collected: 06/21/22 2250    Order Status: Completed Specimen: Blood from Peripheral, Wrist, Left Updated: 06/22/22 0715     Blood Culture, Routine No Growth to date

## 2022-06-22 NOTE — PLAN OF CARE
Recommendations  1. Continue current diabetic diet-fluid per MD   2. When medically feasible, continue Boost glucose control TID   3. RD following    Goals: Continue to meet % EEN/EPN by RD f/u  Nutrition Goal Status: progressing towards goal  Communication of RD Recs: other (POC)

## 2022-06-22 NOTE — ASSESSMENT & PLAN NOTE
"-[facility]  -Approved for LVAD at [facility] on 4/2/22. Was not evaluated for OHTx as he quit smoking in April 2022  -Moved to ICU 6/13 in the setting of significant volume overloaded and low output state. IABP placed 6/13 and Epi and Marcella added for RV support  -Lasix gtt D/C"d 6/17 2/2 increase in sCr. Given 250 cc IV NS and sCr improved  -lasix drip restarted 6/23 at 40 mg/hr and now at 30mg/hr. CVP: 14, SVO 50, CO: 5.37, CI:2.37, SVR: 1162.  Will continue current regimen and repeat labs at 1300  -IABP 1:1, IV milrinone 0.375, epi 0.02, and Marcella 10 ppm.   -TTE 6/14: LVEF 10%, G3DD, mild-moderate reduced RVSF, moderate-severe MR, moderate-sever tr, CVP 15, PASP 56, moderate PH, LVEDD 7.59  -Repeat Echo 6/21 EF: 15%, LVEDD: 7.14, TAPSE: 1.29 severe MR, mod TR. Mild RVE with mod reduced RV failure   -RHC done 4/19/22 on Milrinone 0.5 mcg/kg/min: RA 11, PA 50/27 (35), W 27, CO/CI 3.7/1.7, PVR 2.2 and SVR 1535   -GDMT: Entresto and Aldactone on hold since admit 5/24-5/27 2/2 elevated sCr  -Reports dry weight is 217#, and that he had gained 6# on his home scale PTA  - VAD implant today. Unlikely OHTx candidate with smoking history (quit April 2022)         "

## 2022-06-23 PROBLEM — B95.7 STAPHYLOCOCCUS EPIDERMIDIS BACTEREMIA: Status: ACTIVE | Noted: 2022-06-22

## 2022-06-23 PROBLEM — R78.81 POSITIVE BLOOD CULTURE: Status: RESOLVED | Noted: 2022-06-22 | Resolved: 2022-06-23

## 2022-06-23 LAB
ALBUMIN SERPL BCP-MCNC: 3.4 G/DL (ref 3.5–5.2)
ALLENS TEST: ABNORMAL
ALP SERPL-CCNC: 117 U/L (ref 55–135)
ALT SERPL W/O P-5'-P-CCNC: 21 U/L (ref 10–44)
ANION GAP SERPL CALC-SCNC: 11 MMOL/L (ref 8–16)
ANION GAP SERPL CALC-SCNC: 13 MMOL/L (ref 8–16)
APTT BLDCRRT: 49 SEC (ref 21–32)
AST SERPL-CCNC: 23 U/L (ref 10–40)
BASOPHILS # BLD AUTO: 0.03 K/UL (ref 0–0.2)
BASOPHILS NFR BLD: 0.3 % (ref 0–1.9)
BILIRUB SERPL-MCNC: 1 MG/DL (ref 0.1–1)
BUN SERPL-MCNC: 36 MG/DL (ref 6–20)
BUN SERPL-MCNC: 39 MG/DL (ref 6–20)
CALCIUM SERPL-MCNC: 9.5 MG/DL (ref 8.7–10.5)
CALCIUM SERPL-MCNC: 9.8 MG/DL (ref 8.7–10.5)
CHLORIDE SERPL-SCNC: 87 MMOL/L (ref 95–110)
CHLORIDE SERPL-SCNC: 88 MMOL/L (ref 95–110)
CO2 SERPL-SCNC: 28 MMOL/L (ref 23–29)
CO2 SERPL-SCNC: 31 MMOL/L (ref 23–29)
CREAT SERPL-MCNC: 1.7 MG/DL (ref 0.5–1.4)
CREAT SERPL-MCNC: 1.9 MG/DL (ref 0.5–1.4)
DELSYS: ABNORMAL
DIFFERENTIAL METHOD: ABNORMAL
EOSINOPHIL # BLD AUTO: 0 K/UL (ref 0–0.5)
EOSINOPHIL NFR BLD: 0.4 % (ref 0–8)
ERYTHROCYTE [DISTWIDTH] IN BLOOD BY AUTOMATED COUNT: 14.3 % (ref 11.5–14.5)
EST. GFR  (AFRICAN AMERICAN): 50.9 ML/MIN/1.73 M^2
EST. GFR  (AFRICAN AMERICAN): 58.3 ML/MIN/1.73 M^2
EST. GFR  (NON AFRICAN AMERICAN): 44.1 ML/MIN/1.73 M^2
EST. GFR  (NON AFRICAN AMERICAN): 50.4 ML/MIN/1.73 M^2
GLUCOSE SERPL-MCNC: 347 MG/DL (ref 70–110)
GLUCOSE SERPL-MCNC: 352 MG/DL (ref 70–110)
HCO3 UR-SCNC: 29.8 MMOL/L (ref 24–28)
HCO3 UR-SCNC: 32.8 MMOL/L (ref 24–28)
HCO3 UR-SCNC: 33.6 MMOL/L (ref 24–28)
HCO3 UR-SCNC: 33.9 MMOL/L (ref 24–28)
HCT VFR BLD AUTO: 29.7 % (ref 40–54)
HGB BLD-MCNC: 9.9 G/DL (ref 14–18)
IMM GRANULOCYTES # BLD AUTO: 0.05 K/UL (ref 0–0.04)
IMM GRANULOCYTES NFR BLD AUTO: 0.4 % (ref 0–0.5)
LACTATE SERPL-SCNC: 1.2 MMOL/L (ref 0.5–2.2)
LDH SERPL L TO P-CCNC: 350 U/L (ref 110–260)
LYMPHOCYTES # BLD AUTO: 1.3 K/UL (ref 1–4.8)
LYMPHOCYTES NFR BLD: 11.7 % (ref 18–48)
MAGNESIUM SERPL-MCNC: 2.1 MG/DL (ref 1.6–2.6)
MCH RBC QN AUTO: 29.4 PG (ref 27–31)
MCHC RBC AUTO-ENTMCNC: 33.3 G/DL (ref 32–36)
MCV RBC AUTO: 88 FL (ref 82–98)
MONOCYTES # BLD AUTO: 0.8 K/UL (ref 0.3–1)
MONOCYTES NFR BLD: 6.8 % (ref 4–15)
NEUTROPHILS # BLD AUTO: 9.1 K/UL (ref 1.8–7.7)
NEUTROPHILS NFR BLD: 80.4 % (ref 38–73)
NRBC BLD-RTO: 0 /100 WBC
PCO2 BLDA: 50.6 MMHG (ref 35–45)
PCO2 BLDA: 54 MMHG (ref 35–45)
PCO2 BLDA: 54.5 MMHG (ref 35–45)
PCO2 BLDA: 57.9 MMHG (ref 35–45)
PH SMN: 7.37 [PH] (ref 7.35–7.45)
PH SMN: 7.38 [PH] (ref 7.35–7.45)
PH SMN: 7.39 [PH] (ref 7.35–7.45)
PH SMN: 7.41 [PH] (ref 7.35–7.45)
PLATELET # BLD AUTO: 383 K/UL (ref 150–450)
PMV BLD AUTO: 10.3 FL (ref 9.2–12.9)
PO2 BLDA: 24 MMHG (ref 40–60)
PO2 BLDA: 28 MMHG (ref 40–60)
PO2 BLDA: 30 MMHG (ref 40–60)
PO2 BLDA: 31 MMHG (ref 40–60)
POC BE: 5 MMOL/L
POC BE: 8 MMOL/L
POC BE: 8 MMOL/L
POC BE: 9 MMOL/L
POC SATURATED O2: 40 % (ref 95–100)
POC SATURATED O2: 51 % (ref 95–100)
POC SATURATED O2: 54 % (ref 95–100)
POC SATURATED O2: 58 % (ref 95–100)
POC SVO2: 51 %
POC TCO2: 31 MMOL/L (ref 24–29)
POC TCO2: 34 MMOL/L (ref 24–29)
POC TCO2: 35 MMOL/L (ref 24–29)
POC TCO2: 36 MMOL/L (ref 24–29)
POCT GLUCOSE: 273 MG/DL (ref 70–110)
POCT GLUCOSE: 331 MG/DL (ref 70–110)
POCT GLUCOSE: 336 MG/DL (ref 70–110)
POCT GLUCOSE: 347 MG/DL (ref 70–110)
POCT GLUCOSE: 349 MG/DL (ref 70–110)
POCT GLUCOSE: 357 MG/DL (ref 70–110)
POTASSIUM SERPL-SCNC: 3.8 MMOL/L (ref 3.5–5.1)
POTASSIUM SERPL-SCNC: 3.8 MMOL/L (ref 3.5–5.1)
PROT SERPL-MCNC: 7.9 G/DL (ref 6–8.4)
RBC # BLD AUTO: 3.37 M/UL (ref 4.6–6.2)
SAMPLE: ABNORMAL
SITE: ABNORMAL
SODIUM SERPL-SCNC: 129 MMOL/L (ref 136–145)
SODIUM SERPL-SCNC: 129 MMOL/L (ref 136–145)
WBC # BLD AUTO: 11.31 K/UL (ref 3.9–12.7)

## 2022-06-23 PROCEDURE — 25000003 PHARM REV CODE 250: Mod: NTX

## 2022-06-23 PROCEDURE — 20000000 HC ICU ROOM: Mod: NTX

## 2022-06-23 PROCEDURE — 63600175 PHARM REV CODE 636 W HCPCS: Mod: NTX | Performed by: INTERNAL MEDICINE

## 2022-06-23 PROCEDURE — 99233 SBSQ HOSP IP/OBS HIGH 50: CPT | Mod: NTX,,, | Performed by: INTERNAL MEDICINE

## 2022-06-23 PROCEDURE — 82803 BLOOD GASES ANY COMBINATION: CPT | Mod: NTX

## 2022-06-23 PROCEDURE — 99291 PR CRITICAL CARE, E/M 30-74 MINUTES: ICD-10-PCS | Mod: NTX,,, | Performed by: PHYSICIAN ASSISTANT

## 2022-06-23 PROCEDURE — 80048 BASIC METABOLIC PNL TOTAL CA: CPT | Mod: NTX,XB | Performed by: INTERNAL MEDICINE

## 2022-06-23 PROCEDURE — 99292 CRITICAL CARE ADDL 30 MIN: CPT | Mod: NTX,,, | Performed by: INTERNAL MEDICINE

## 2022-06-23 PROCEDURE — 27100094 HC IABP, SET-UP: Mod: NTX

## 2022-06-23 PROCEDURE — 63600175 PHARM REV CODE 636 W HCPCS: Mod: NTX | Performed by: NURSE PRACTITIONER

## 2022-06-23 PROCEDURE — 63600175 PHARM REV CODE 636 W HCPCS: Mod: NTX | Performed by: STUDENT IN AN ORGANIZED HEALTH CARE EDUCATION/TRAINING PROGRAM

## 2022-06-23 PROCEDURE — 25000003 PHARM REV CODE 250: Mod: NTX | Performed by: INTERNAL MEDICINE

## 2022-06-23 PROCEDURE — 36556 INSERT NON-TUNNEL CV CATH: CPT | Mod: NTX,,, | Performed by: ANESTHESIOLOGY

## 2022-06-23 PROCEDURE — C1751 CATH, INF, PER/CENT/MIDLINE: HCPCS | Mod: NTX

## 2022-06-23 PROCEDURE — 99499 UNLISTED E&M SERVICE: CPT | Mod: ,,, | Performed by: THORACIC SURGERY (CARDIOTHORACIC VASCULAR SURGERY)

## 2022-06-23 PROCEDURE — 87040 BLOOD CULTURE FOR BACTERIA: CPT | Mod: NTX | Performed by: INTERNAL MEDICINE

## 2022-06-23 PROCEDURE — 25000003 PHARM REV CODE 250: Mod: NTX | Performed by: STUDENT IN AN ORGANIZED HEALTH CARE EDUCATION/TRAINING PROGRAM

## 2022-06-23 PROCEDURE — 63600367 HC NITRIC OXIDE PER HOUR: Mod: NTX

## 2022-06-23 PROCEDURE — 99233 PR SUBSEQUENT HOSPITAL CARE,LEVL III: ICD-10-PCS | Mod: NTX,,, | Performed by: INTERNAL MEDICINE

## 2022-06-23 PROCEDURE — 85025 COMPLETE CBC W/AUTO DIFF WBC: CPT | Mod: NTX | Performed by: INTERNAL MEDICINE

## 2022-06-23 PROCEDURE — 83615 LACTATE (LD) (LDH) ENZYME: CPT | Mod: NTX | Performed by: INTERNAL MEDICINE

## 2022-06-23 PROCEDURE — 63600175 PHARM REV CODE 636 W HCPCS: Mod: NTX

## 2022-06-23 PROCEDURE — 27200234 HC IABP BALLOON

## 2022-06-23 PROCEDURE — 25000003 PHARM REV CODE 250: Mod: NTX | Performed by: NURSE PRACTITIONER

## 2022-06-23 PROCEDURE — 99900035 HC TECH TIME PER 15 MIN (STAT): Mod: NTX

## 2022-06-23 PROCEDURE — 80053 COMPREHEN METABOLIC PANEL: CPT | Mod: NTX | Performed by: NURSE PRACTITIONER

## 2022-06-23 PROCEDURE — 33967 INSERT I-AORT PERCUT DEVICE: CPT | Mod: NTX

## 2022-06-23 PROCEDURE — 27000202 HC IABP, ADD'L DAY: Mod: NTX

## 2022-06-23 PROCEDURE — 99499 NO LOS: ICD-10-PCS | Mod: ,,, | Performed by: THORACIC SURGERY (CARDIOTHORACIC VASCULAR SURGERY)

## 2022-06-23 PROCEDURE — 87077 CULTURE AEROBIC IDENTIFY: CPT | Mod: NTX | Performed by: INTERNAL MEDICINE

## 2022-06-23 PROCEDURE — 36556: ICD-10-PCS | Mod: NTX,,, | Performed by: ANESTHESIOLOGY

## 2022-06-23 PROCEDURE — 99292 PR CRITICAL CARE, ADDL 30 MIN: ICD-10-PCS | Mod: NTX,,, | Performed by: INTERNAL MEDICINE

## 2022-06-23 PROCEDURE — 87186 SC STD MICRODIL/AGAR DIL: CPT | Mod: NTX | Performed by: INTERNAL MEDICINE

## 2022-06-23 PROCEDURE — 85730 THROMBOPLASTIN TIME PARTIAL: CPT | Mod: NTX | Performed by: INTERNAL MEDICINE

## 2022-06-23 PROCEDURE — 94761 N-INVAS EAR/PLS OXIMETRY MLT: CPT | Mod: NTX

## 2022-06-23 PROCEDURE — 83735 ASSAY OF MAGNESIUM: CPT | Mod: NTX | Performed by: INTERNAL MEDICINE

## 2022-06-23 PROCEDURE — 83050 HGB METHEMOGLOBIN QUAN: CPT | Mod: NTX

## 2022-06-23 PROCEDURE — 27000221 HC OXYGEN, UP TO 24 HOURS: Mod: NTX

## 2022-06-23 PROCEDURE — 83605 ASSAY OF LACTIC ACID: CPT | Mod: NTX | Performed by: PHYSICIAN ASSISTANT

## 2022-06-23 PROCEDURE — 36556 INSERT NON-TUNNEL CV CATH: CPT | Mod: NTX

## 2022-06-23 PROCEDURE — 99291 CRITICAL CARE FIRST HOUR: CPT | Mod: NTX,,, | Performed by: PHYSICIAN ASSISTANT

## 2022-06-23 RX ORDER — LORAZEPAM 2 MG/ML
1 INJECTION INTRAMUSCULAR ONCE
Status: COMPLETED | OUTPATIENT
Start: 2022-06-23 | End: 2022-06-23

## 2022-06-23 RX ORDER — DIPHENHYDRAMINE HCL 25 MG
50 CAPSULE ORAL
Status: DISCONTINUED | OUTPATIENT
Start: 2022-06-23 | End: 2022-06-29 | Stop reason: HOSPADM

## 2022-06-23 RX ORDER — LIDOCAINE HYDROCHLORIDE 10 MG/ML
INJECTION, SOLUTION EPIDURAL; INFILTRATION; INTRACAUDAL; PERINEURAL
Status: COMPLETED
Start: 2022-06-23 | End: 2022-06-23

## 2022-06-23 RX ORDER — HYDROMORPHONE HYDROCHLORIDE 1 MG/ML
0.5 INJECTION, SOLUTION INTRAMUSCULAR; INTRAVENOUS; SUBCUTANEOUS ONCE
Status: COMPLETED | OUTPATIENT
Start: 2022-06-23 | End: 2022-06-23

## 2022-06-23 RX ORDER — LIDOCAINE HYDROCHLORIDE 20 MG/ML
20 INJECTION, SOLUTION INFILTRATION; PERINEURAL ONCE
Status: DISCONTINUED | OUTPATIENT
Start: 2022-06-23 | End: 2022-06-29

## 2022-06-23 RX ORDER — LIDOCAINE HYDROCHLORIDE 20 MG/ML
INJECTION INTRAVENOUS
Status: COMPLETED
Start: 2022-06-23 | End: 2022-06-23

## 2022-06-23 RX ORDER — METHOCARBAMOL 500 MG/1
500 TABLET, FILM COATED ORAL ONCE
Status: COMPLETED | OUTPATIENT
Start: 2022-06-23 | End: 2022-06-23

## 2022-06-23 RX ORDER — POTASSIUM CHLORIDE 20 MEQ/1
20 TABLET, EXTENDED RELEASE ORAL ONCE
Status: COMPLETED | OUTPATIENT
Start: 2022-06-23 | End: 2022-06-23

## 2022-06-23 RX ORDER — FENTANYL CITRATE 50 UG/ML
50 INJECTION, SOLUTION INTRAMUSCULAR; INTRAVENOUS ONCE
Status: COMPLETED | OUTPATIENT
Start: 2022-06-23 | End: 2022-06-23

## 2022-06-23 RX ORDER — LORAZEPAM 2 MG/ML
INJECTION INTRAMUSCULAR
Status: COMPLETED
Start: 2022-06-23 | End: 2022-06-23

## 2022-06-23 RX ORDER — SODIUM CHLORIDE 0.9 % (FLUSH) 0.9 %
10 SYRINGE (ML) INJECTION
Status: DISCONTINUED | OUTPATIENT
Start: 2022-06-23 | End: 2022-06-29

## 2022-06-23 RX ADMIN — PANTOPRAZOLE SODIUM 40 MG: 40 TABLET, DELAYED RELEASE ORAL at 08:06

## 2022-06-23 RX ADMIN — HEPARIN SODIUM 24 UNITS/KG/HR: 5000 INJECTION INTRAVENOUS; SUBCUTANEOUS at 01:06

## 2022-06-23 RX ADMIN — FUROSEMIDE 30 MG/HR: 10 INJECTION, SOLUTION INTRAMUSCULAR; INTRAVENOUS at 10:06

## 2022-06-23 RX ADMIN — HYDROCODONE BITARTRATE AND ACETAMINOPHEN 1 TABLET: 5; 325 TABLET ORAL at 01:06

## 2022-06-23 RX ADMIN — MILRINONE LACTATE IN DEXTROSE 0.38 MCG/KG/MIN: 200 INJECTION, SOLUTION INTRAVENOUS at 10:06

## 2022-06-23 RX ADMIN — INSULIN ASPART 8 UNITS: 100 INJECTION, SOLUTION INTRAVENOUS; SUBCUTANEOUS at 12:06

## 2022-06-23 RX ADMIN — METHOCARBAMOL 500 MG: 500 TABLET ORAL at 03:06

## 2022-06-23 RX ADMIN — LIDOCAINE HYDROCHLORIDE: 20 INJECTION INTRAVENOUS at 09:06

## 2022-06-23 RX ADMIN — BUSPIRONE HYDROCHLORIDE 7.5 MG: 5 TABLET ORAL at 08:06

## 2022-06-23 RX ADMIN — POTASSIUM CHLORIDE 30 MEQ: 10 CAPSULE, COATED, EXTENDED RELEASE ORAL at 08:06

## 2022-06-23 RX ADMIN — GABAPENTIN 300 MG: 300 CAPSULE ORAL at 03:06

## 2022-06-23 RX ADMIN — FUROSEMIDE 30 MG/HR: 10 INJECTION, SOLUTION INTRAMUSCULAR; INTRAVENOUS at 12:06

## 2022-06-23 RX ADMIN — FUROSEMIDE 30 MG/HR: 10 INJECTION, SOLUTION INTRAMUSCULAR; INTRAVENOUS at 04:06

## 2022-06-23 RX ADMIN — LIDOCAINE HYDROCHLORIDE: 10 INJECTION, SOLUTION EPIDURAL; INFILTRATION; INTRACAUDAL at 09:06

## 2022-06-23 RX ADMIN — METHOCARBAMOL TABLETS 500 MG: 500 TABLET, COATED ORAL at 08:06

## 2022-06-23 RX ADMIN — ACETAMINOPHEN 650 MG: 325 TABLET ORAL at 01:06

## 2022-06-23 RX ADMIN — SODIUM CHLORIDE: 9 INJECTION, SOLUTION INTRAVENOUS at 01:06

## 2022-06-23 RX ADMIN — METHOCARBAMOL TABLETS 500 MG: 500 TABLET, COATED ORAL at 01:06

## 2022-06-23 RX ADMIN — HYDROMORPHONE HYDROCHLORIDE 0.5 MG: 1 INJECTION, SOLUTION INTRAMUSCULAR; INTRAVENOUS; SUBCUTANEOUS at 09:06

## 2022-06-23 RX ADMIN — SENNOSIDES AND DOCUSATE SODIUM 1 TABLET: 50; 8.6 TABLET ORAL at 08:06

## 2022-06-23 RX ADMIN — MILRINONE LACTATE IN DEXTROSE 0.38 MCG/KG/MIN: 200 INJECTION, SOLUTION INTRAVENOUS at 07:06

## 2022-06-23 RX ADMIN — INSULIN ASPART 6 UNITS: 100 INJECTION, SOLUTION INTRAVENOUS; SUBCUTANEOUS at 08:06

## 2022-06-23 RX ADMIN — INSULIN ASPART 10 UNITS: 100 INJECTION, SOLUTION INTRAVENOUS; SUBCUTANEOUS at 11:06

## 2022-06-23 RX ADMIN — POTASSIUM CHLORIDE 20 MEQ: 1500 TABLET, EXTENDED RELEASE ORAL at 05:06

## 2022-06-23 RX ADMIN — Medication 400 MG: at 08:06

## 2022-06-23 RX ADMIN — INSULIN DETEMIR 20 UNITS: 100 INJECTION, SOLUTION SUBCUTANEOUS at 08:06

## 2022-06-23 RX ADMIN — ASPIRIN 81 MG CHEWABLE TABLET 81 MG: 81 TABLET CHEWABLE at 08:06

## 2022-06-23 RX ADMIN — LORAZEPAM 1 MG: 2 INJECTION INTRAMUSCULAR; INTRAVENOUS at 05:06

## 2022-06-23 RX ADMIN — VANCOMYCIN HYDROCHLORIDE 1000 MG: 1 INJECTION, POWDER, LYOPHILIZED, FOR SOLUTION INTRAVENOUS at 01:06

## 2022-06-23 RX ADMIN — Medication 400 MG: at 09:06

## 2022-06-23 RX ADMIN — POTASSIUM CHLORIDE 30 MEQ: 10 CAPSULE, COATED, EXTENDED RELEASE ORAL at 09:06

## 2022-06-23 RX ADMIN — MIRTAZAPINE 15 MG: 15 TABLET, FILM COATED ORAL at 08:06

## 2022-06-23 RX ADMIN — GABAPENTIN 300 MG: 300 CAPSULE ORAL at 08:06

## 2022-06-23 RX ADMIN — MILRINONE LACTATE IN DEXTROSE 0.38 MCG/KG/MIN: 200 INJECTION, SOLUTION INTRAVENOUS at 05:06

## 2022-06-23 RX ADMIN — POTASSIUM BICARBONATE 50 MEQ: 978 TABLET, EFFERVESCENT ORAL at 09:06

## 2022-06-23 RX ADMIN — ALPRAZOLAM 0.25 MG: 0.25 TABLET ORAL at 02:06

## 2022-06-23 RX ADMIN — INSULIN ASPART 8 UNITS: 100 INJECTION, SOLUTION INTRAVENOUS; SUBCUTANEOUS at 05:06

## 2022-06-23 RX ADMIN — HYDROCODONE BITARTRATE AND ACETAMINOPHEN 1 TABLET: 5; 325 TABLET ORAL at 08:06

## 2022-06-23 RX ADMIN — EPINEPHRINE 0.04 MCG/KG/MIN: 1 INJECTION INTRAMUSCULAR; INTRAVENOUS; SUBCUTANEOUS at 10:06

## 2022-06-23 RX ADMIN — LORAZEPAM 1 MG: 2 INJECTION INTRAMUSCULAR at 05:06

## 2022-06-23 RX ADMIN — METHOCARBAMOL TABLETS 500 MG: 500 TABLET, COATED ORAL at 04:06

## 2022-06-23 RX ADMIN — EPINEPHRINE 0.04 MCG/KG/MIN: 1 INJECTION INTRAMUSCULAR; INTRAVENOUS; SUBCUTANEOUS at 12:06

## 2022-06-23 RX ADMIN — HEPARIN SODIUM 24 UNITS/KG/HR: 5000 INJECTION INTRAVENOUS; SUBCUTANEOUS at 09:06

## 2022-06-23 RX ADMIN — FENTANYL CITRATE 50 MCG: 50 INJECTION INTRAMUSCULAR; INTRAVENOUS at 09:06

## 2022-06-23 RX ADMIN — SUCRALFATE 1 G: 1 TABLET ORAL at 09:06

## 2022-06-23 RX ADMIN — INSULIN ASPART 8 UNITS: 100 INJECTION, SOLUTION INTRAVENOUS; SUBCUTANEOUS at 06:06

## 2022-06-23 RX ADMIN — PROMETHAZINE HYDROCHLORIDE 6.25 MG: 25 INJECTION INTRAMUSCULAR; INTRAVENOUS at 09:06

## 2022-06-23 RX ADMIN — ALPRAZOLAM 0.25 MG: 0.25 TABLET ORAL at 08:06

## 2022-06-23 RX ADMIN — ACETAMINOPHEN 650 MG: 325 TABLET ORAL at 09:06

## 2022-06-23 RX ADMIN — HYDROCODONE BITARTRATE AND ACETAMINOPHEN 1 TABLET: 5; 325 TABLET ORAL at 05:06

## 2022-06-23 NOTE — PROGRESS NOTES
Interdisciplinary Rounds Report:   Attended interdisciplinary rounds with the [facility]/CTS services including the LVAD Coordinators, social workers, cardiologists, surgeons,  PT/OT/Speech, dietician, and unit charge nurses. Discussed patient status, plan of care, goals of care, including DC date, and post discharge needs. Plan of care will be discussed with the patient and/or family per the physician while rounding on the floor. This is a recurring meeting that is medically and socially necessary to collaborate with the interdisciplinary team to assist patient needs and safe discharge.

## 2022-06-23 NOTE — PT/OT/SLP PROGRESS
Occupational Therapy      Patient Name:  Kevan Queen   MRN:  [id number]    Patient not seen today secondary to Femoral IABP remains in place, thus pt appropriate for one discipline for bed level activity. PT following at this time  . Will follow-up at a later date.    6/23/2022

## 2022-06-23 NOTE — PLAN OF CARE
CICU DAILY GOALS       A: Awake    RASS: Goal - RASS Goal: 0-->alert and calm  Actual - RASS (Diggs Agitation-Sedation Scale): 0-->alert and calm   Restraint necessity:    B: Breath   SBT: Not intubated   C: Coordinate A & B, analgesics/sedatives   Pain: managed    SAT: Not intubated  D: Delirium   CAM-ICU: Overall CAM-ICU: Negative  E: Early(intubated/ Progressive (non-intubated) Mobility   MOVE Screen: Fail   Activity: Activity Management: Rolling - L1  FAS: Feeding/Nutrition   Diet order: Diet/Nutrition Received: low saturated fat/low cholesterol, 2 gram sodium,   Fluid restriction: Fluid Requirement: 1500 fr  T: Thrombus   DVT prophylaxis: VTE Required Core Measure: Pharmacological prophylaxis initiated/maintained  H: HOB Elevation   Head of Bed (HOB) Positioning: HOB at 15 degrees  U: Ulcer Prophylaxis   GI: yes  G: Glucose control   uncontrolled Glycemic Management: blood glucose monitored, supplemental insulin given  S: Skin   Bundle compliance: yes   Bathing/Skin Care: back care, bath, complete, bath, partial, dressed/undressed, electrode patches/site rotation Date: AM bath  B: Bowel Function   constipation   I: Indwelling Catheters   Cartwright necessity:     CVC necessity: Yes   IPAD offered: No  D: De-escalation Antibx   No  Plan for the day   Monitor VS, monitor CVP 15, 11, 15, SVO2 58 this AM, POCT BG elevated in 300's, covered with sliding scale.   Family/Goals of care/Code Status   Code Status: Full Code     No acute events throughout day, VS and assessment per flow sheet, patient progressing towards goals as tolerated, plan of care reviewed with Kevan Queen and family, all concerns addressed, will continue to monitor.

## 2022-06-23 NOTE — ASSESSMENT & PLAN NOTE
37-year-old male with history of NIDCM with BiV systolic heart failure on home milrinone presented 6/13/2022 for ADHF, IABP placed 6/13/2022, now with Staph epi bacteremia, vancomycin initiated 6/22/2022. IABP and IJ line exchanged on 6/23/2022.    Recommendations:  - Continue vancomycin IV, goal 15-20  - Follow-up blood cultures ID and susceptibilities  - Follow-up repeat blood cultures from 6/23/2022   - If blood cultures remain positive, consider GUILLERMO to evaluate for ICD endocarditis

## 2022-06-23 NOTE — NURSING
Micro notified RN for positive blood cultures: gram positive cocci in clusters for anaerobic blood culture collected on 6/21/2022 @2250, MD Silva notified, no new orders.

## 2022-06-23 NOTE — BRIEF OP NOTE
Intra Aortic Balloon Pump Procedure Note  Interventional Cardiology Service      Physician: Angela Johnson MD    Procedure: Intra Aortic Balloon Pump Placement, Bedside  Date: 06/23/2022  Type of Device: Intra Aortic Balloon Pump  Size: 40 cc linear  Device SN or lot #: [id number]  Expiration date: 2023-03-15  Access: L CFA    Anesthesia: Local  Full Drape Used: Yes    Procedure: The L groin was prepped and draped in normal sterile fashion. An ultrasound was used for guidance, a micropuncture needle was used to access the L femoral artery. Then over the wire, the vessel was dilated and the micro puncture catheter was passed into the vessel via the Seldinger technique. A small knick was made in the skin and the micropuncture catheter was exchanged with an 8Fr sheath. The sheath was sutured in place. 40 cc linear balloon pump was placed in L  femoral artery. Chest x-ray confirmed positioning post procedure. The device settings and alarms were verified to be accurate. Post procedure, L groin was without hematoma/excessive bleeding. Peripheral pulses were intact, pre and post procedure.     Tahir X-ray: Proper placement of IABP    Estimated Blood loss: <20 cc     Complications: None    Assessment/Recommendations:  - Monitor bilateral DP/PT pulses Q1H while IABP in place.  - Notify on-call Interventional Cardiology fellow if any change in exam.   - Strict bedrest.  - Daily chest x-rays.  - Consider starting heparin infusion while IABP in place if no contraindications exist. Defer initiation to primary team.      Staff: Dr Daniel

## 2022-06-23 NOTE — EICU
Rounding (Video Assessment):  Yes    Intervention Initiated From:  Bedside    Thomas Communicated with Bedside Nurse regarding:  Time-Out    Nurse Notified:  Yes    Doctor Notified:  Yes    Comments: Privileges verified on Dr. Angela Johnson for insertion of left Femoral artery IABP. Time out completed. Patient has been prepped and draped. Time out completed with Hever Mera RN. Note Heparin drip is off.   0950 8 [place] shealth inserted followed by IABP 40 cc balloon, stat CXR ordered  1009 Portable CXR done  1012 IABP on right side on standby VSS  1015 Connected to IABP line calibrated  1016 repeat CXR done  1021 Stat locks applied at the top near insertion & base of IAB. Biopatch applied then covered with Tegaderm. Patient tolerated well.   1024 Aspirated blood from right shealth. Sutures removed.   1024 8 [place] shealth removed. Pressure held.   1038 Pedal pulses palpable per RN Site is soft no hematoma. Instructions given to patient per MD. Not to move right leg, if coughing hold pressure to right groin, if warm sensation, call nurse to   1041 Pressure released, tega derm applied. Patient tolrated well.

## 2022-06-23 NOTE — PROGRESS NOTES
Rounded on patient, resting in bed on IABP support, AAO, no family/caregiver present. VAD implant rescheduled d/t +ve blood cultures. Patient remains in good spirits, no questions at this time. VAD coordinator remains available for any questions/concerns.

## 2022-06-23 NOTE — ASSESSMENT & PLAN NOTE
Plan for IABP exchange     - Access: Right Femoral artery  - Creatinine/CrCl: 1.7  - Allergies: No shellfish / Iodine allergy  - Pre-Op Med: Bendaryl 50mg pO   - All patient's questions were answered.  -The risks, benefits and alternatives of the procedure were explained to the patient.   -The risks of IABP include but are not limited to: bleeding, infection,, kidney injury   -The risks of moderate sedation include hypotension, respiratory depression, arrhythmias, bronchospasm, and death.   - Informed consent was obtained and the  patient is agreeable to proceed with the procedure.

## 2022-06-23 NOTE — PROGRESS NOTES
[facility] - Cardiac Intensive Care  Heart Transplant  Progress Note    Patient Name: Kevan Queen  MRN: [id number]  Admission Date: 6/13/2022  Hospital Length of Stay: 10 days  Attending Physician: Luca Lopez Jr.,*  Primary Care Provider: ORALIA Cline  Principal Problem:Acute on chronic combined systolic and diastolic heart failure, NYHA class 4    Subjective:     **Interval History: CI dropped overnight and Epi increased to 0.04.  Started on empiric Vanc yesterday for positive blood culture on 6/20.  Repeat cultures on 6/21 positive.  ID consulted and following.  IABP exchanged at bedside today.  Will likely exchange central line as well.   He is net negative 3.8L in the last 24 hours.  CVP: 15, SVO2: 58, CO: 6.47, CI: 2.86, SVR: 1013.  Creatinine is stable at 1.7 (down from 1.8 yesterday).  Will continue current regimen for now. Will await further recommendations from ID     Lines:  IABP 6/23  Left IJ: 6/20    Continuous Infusions:   sodium chloride 0.9% Stopped (06/23/22 0358)    EPINEPHrine 0.04 mcg/kg/min (06/23/22 0900)    furosemide (LASIX) 10 mg/mL infusion (non-titrating) 30 mg/hr (06/23/22 0900)    heparin (porcine) in D5W Stopped (06/23/22 0834)    milrinone 20mg/100ml D5W (200mcg/ml) 0.375 mcg/kg/min (06/23/22 0900)    nitric oxide gas       Scheduled Meds:   acetaminophen  650 mg Oral Q8H    aspirin  81 mg Oral Daily    busPIRone  7.5 mg Oral Q12H    gabapentin  300 mg Oral TID    HYDROcodone-acetaminophen  1 tablet Oral Q8H    insulin detemir U-100  20 Units Subcutaneous Daily    LIDOcaine HCL 20 mg/ml (2%)  20 mL Other Once    magnesium oxide  400 mg Oral BID    methocarbamoL  500 mg Oral QID    mirtazapine  15 mg Oral Nightly    pantoprazole  40 mg Oral Daily    polyethylene glycol  17 g Oral BID    potassium chloride  30 mEq Oral BID    senna-docusate 8.6-50 mg  1 tablet Oral Daily    sucralfate  1 g Oral Nightly    vancomycin (VANCOCIN) IVPB  1,000 mg Intravenous  Q12H     PRN Meds:sodium chloride, sodium chloride, sodium chloride, ALPRAZolam, bisacodyL, dextrose 10%, dextrose 10%, diphenhydrAMINE, glucagon (human recombinant), insulin aspart U-100, magnesium oxide, magnesium oxide, mupirocin, potassium bicarbonate, potassium bicarbonate, potassium bicarbonate, potassium, sodium phosphates, potassium, sodium phosphates, potassium, sodium phosphates, promethazine, sodium chloride 0.9%, sodium chloride 0.9%, Pharmacy to dose Vancomycin consult **AND** vancomycin - pharmacy to dose    Review of patient's allergies indicates:   Allergen Reactions    Aspirin Other (See Comments)     Mr. Thacker is enrolled in Dr. Paige's Alon Trial and cannot have any aspirin and/or aspirin-containing products. DO NOT cancel any orders for INV Aspirin 100 mg/Placebo. If you have questions, please contact Isabel @ [phone], [phone],[email or web address], secure chat or MS Teams message.    Bumex [bumetanide] Hives    Lactose Diarrhea     Other reaction(s): Abdominal distension, gaseous    Torsemide Hives     Objective:     Vital Signs (Most Recent):  Temp: 98.5 °F (36.9 °C) (06/23/22 0700)  Pulse: (!) 111 (06/23/22 0900)  Resp: 18 (06/23/22 0944)  BP: 127/62 (06/23/22 0900)  SpO2: 99 % (06/23/22 0900)   Vital Signs (24h Range):  Temp:  [98 °F (36.7 °C)-98.7 °F (37.1 °C)] 98.5 °F (36.9 °C)  Pulse:  [100-116] 111  Resp:  [11-25] 18  SpO2:  [95 %-100 %] 99 %  BP: (108-142)/(62-86) 127/62     Patient Vitals for the past 72 hrs (Last 3 readings):   Weight   06/22/22 0300 96.2 kg (212 lb 1.3 oz)   06/21/22 0900 97.5 kg (215 lb)   06/21/22 0612 97.7 kg (215 lb 6.2 oz)       Body mass index is 26.51 kg/m².      Intake/Output Summary (Last 24 hours) at 6/23/2022 1138  Last data filed at 6/23/2022 0900  Gross per 24 hour   Intake 1757.45 ml   Output 6145 ml   Net -4387.55 ml         Hemodynamic Parameters:       Telemetry: ST    Physical Exam  Constitutional:       Appearance: Normal appearance.    HENT:      Head: Normocephalic and atraumatic.   Eyes:      Conjunctiva/sclera: Conjunctivae normal.      Pupils: Pupils are equal, round, and reactive to light.   Neck:      Comments: [facility] TLC  Cardiovascular:      Rate and Rhythm: Regular rhythm. Tachycardia present.      Comments: +S3  Pulmonary:      Effort: Pulmonary effort is normal.      Breath sounds: Normal breath sounds.   Abdominal:      General: Bowel sounds are normal. There is distension.   Musculoskeletal:         General: No swelling. Normal range of motion.      Cervical back: Neck supple.   Skin:     General: Skin is warm and dry.      Capillary Refill: Capillary refill takes 2 to 3 seconds.   Neurological:      General: No focal deficit present.      Mental Status: He is alert and oriented to person, place, and time.   Psychiatric:         Mood and Affect: Mood normal.         Behavior: Behavior normal.         Thought Content: Thought content normal.         Judgment: Judgment normal.       Significant Labs:  CBC:  Recent Labs   Lab 06/22/22  0344 06/22/22  1347 06/23/22  0246   WBC 12.93* 12.11 11.31   RBC 3.46* 3.42* 3.37*   HGB 10.1* 10.0* 9.9*   HCT 30.3* 29.6* 29.7*    376 383   MCV 88 87 88   MCH 29.2 29.2 29.4   MCHC 33.3 33.8 33.3       BNP:  Recent Labs   Lab 06/17/22  0332   *       CMP:  Recent Labs   Lab 06/21/22  0418 06/21/22  1459 06/22/22  0344 06/22/22  1347 06/23/22  0246   *   < > 210* 331* 352*   CALCIUM 9.7   < > 9.8 9.7 9.5   ALBUMIN 3.3*  --  3.4*  --  3.4*   PROT 7.7  --  8.0  --  7.9   *   < > 132* 130* 129*   K 3.9   < > 3.6 3.6 3.8   CO2 29   < > 30* 33* 31*   CL 94*   < > 89* 87* 87*   BUN 25*   < > 32* 37* 39*   CREATININE 1.5*   < > 1.8* 1.8* 1.7*   ALKPHOS 106  --  113  --  117   ALT 22  --  18  --  21   AST 26  --  24  --  23   BILITOT 0.8  --  1.3*  --  1.0    < > = values in this interval not displayed.        Coagulation:   Recent Labs   Lab 06/20/22  6291 06/21/22  7186  06/22/22  0344 06/22/22  1347 06/23/22  0246   INR 1.0  --   --   --   --    APTT  --    < > 46.1* 49.5* 49.0*    < > = values in this interval not displayed.       LDH:  Recent Labs   Lab 06/23/22  0246   *     Microbiology:  Microbiology Results (last 7 days)       Procedure Component Value Units Date/Time    Blood culture [[id number]]  (Abnormal) Collected: 06/21/22 2250    Order Status: Completed Specimen: Blood from Peripheral, Wrist, Left Updated: 06/23/22 1036     Blood Culture, Routine Gram stain dudley bottle: Gram positive cocci      Results called to and read back by: Arnulfo Herrera RN. 06/22/2022  17:46      Gram stain aer bottle: Gram positive cocci in clusters resembling Staph       Positive results previously called 06/23/2022  00:11      STAPHYLOCOCCUS SPECIES  Identification and susceptibility pending      Blood culture [[id number]] Collected: 06/23/22 0108    Order Status: Completed Specimen: Blood from Peripheral, Hand, Right Updated: 06/23/22 0945     Blood Culture, Routine No Growth to date    Blood culture [[id number]] Collected: 06/23/22 0105    Order Status: Completed Specimen: Blood from Peripheral, Antecubital, Right Updated: 06/23/22 0945     Blood Culture, Routine No Growth to date    Blood culture [[id number]]  (Abnormal) Collected: 06/20/22 1651    Order Status: Completed Specimen: Blood from Peripheral, Hand, Left Updated: 06/23/22 0750     Blood Culture, Routine Gram stain dudley bottle: Gram positive cocci in clusters resembling Staph      Results called to and read back by:Tara López RN 06/21/2022  22:16      STAPHYLOCOCCUS SPECIES  Identification and susceptibility pending      Blood culture [[id number]] Collected: 06/21/22 2250    Order Status: Completed Specimen: Blood from Peripheral, Wrist, Left Updated: 06/23/22 0433     Blood Culture, Routine Gram stain dudley bottle: Gram positive cocci in clusters resembling Staph      Results called to and read back by: Rosa Pardo RN.  "06/22/2022  23:29      Gram stain aer bottle: Gram positive cocci in clusters resembling Staph       Positive results previously called 06/23/2022  04:33    Blood culture [[id number]]     Order Status: Canceled Specimen: Blood             I have reviewed all pertinent labs within the past 24 hours.    Estimated Creatinine Clearance: 71.1 mL/min (A) (based on SCr of 1.7 mg/dL (H)).    Diagnostic Results:  I have reviewed and interpreted all pertinent imaging results/findings within the past 24 hours.    Assessment and Plan:     36 yo male with NIDCM with BiV systolic heart failure, on home Milrinone at 0.375 mcg/kg/min, presented at [facility] 4/2/22 ,was not evaluated for OHT as he has recently quit smoking in April 2022 but was approved for VAD with plan to begin OHT evaluation in upcoming months if Mr Queen is stable and suitable for OHT eval (blood group A), 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 Went to ED at [facility] 6/10 but left after waiting 4 hours. Had clinic appointment with us today, but arrived to [facility] early this morning and decided to go to the ED instead. Baseline Lasix dose is 80 mg bid. Reports taking 240 mg qd past 3 days with no improvement. BNP is 1701, up from 898 on 6/2 and 49 on 5/24. sCr is 1.8 with baseline ~ 1.5-1.7. sPO2 on RA is 93%. Wife at bedside    He has been given Lasix 80 mg IVP in the ED with plan to start Lasix gtt at 20 mg/hr           * Acute on chronic combined systolic and diastolic heart failure, NYHA class 4  -NIDCM  -Approved for LVAD at [facility] on 4/2/22. Was not evaluated for OHTx as he quit smoking in April 2022  -Moved to ICU 6/13 in the " "setting of significant volume overloaded and low output state. IABP placed 6/13 and Epi and Marcella added for RV support  -Lasix gtt D/C"d 6/17 2/2 increase in sCr. Given 250 cc IV NS and sCr improved  -lasix drip restarted 6/23 at 40 mg/hr and now at 30mg/hr. Epi increased overnight to 0.04.  CVP: 15, SVO 58, CO: 6.47, CI:2.86, SVR: 1013. -IABP 1:1, IV milrinone 0.375, epi 0.04, and Marcella 10 ppm.   -TTE 6/14: LVEF 10%, G3DD, mild-moderate reduced RVSF, moderate-severe MR, moderate-sever tr, CVP 15, PASP 56, moderate PH, LVEDD 7.59  -Repeat Echo 6/21 EF: 15%, LVEDD: 7.14, TAPSE: 1.29 severe MR, mod TR. Mild RVE with mod reduced RV failure   -RHC done 4/19/22 on Milrinone 0.5 mcg/kg/min: RA 11, PA 50/27 (35), W 27, CO/CI 3.7/1.7, PVR 2.2 and SVR 1535   -GDMT: Entresto and Aldactone on hold since admit 5/24-5/27 2/2 elevated sCr  -Reports dry weight is 217#, and that he had gained 6# on his home scale PTA  - VAD implant today. Unlikely OHTx candidate with smoking history (quit April 2022)           Positive blood culture  -Blood cultures drawn 6/20 positive for gram + cocci  -PICC line removed and IJ exchanged on 6/20  -Started on Empiric Vanc 6/22  -Repeat cultures from 6/21 now also positive  -patient has been afebrile  -ID consulted given upcoming planned LVAD   -IABP exchanged at bedside 6/23 and will plan to exchange line placed on 6/20    Cardiogenic shock  -See A/C CHF    CKD (chronic kidney disease) stage 3, GFR 30-59 ml/min  -Admit sCr 1.8, baseline ~ 1.5-1.7  -creatinine elevated to 2.0 but improved after fluid.    -creatinine has been stable and is 1.7 today  -Will monitor response    Muscle cramping  -decreased scheduled Percocet and starting Gabapentin    Uncontrolled diabetes mellitus  -Admitted 5/24-5/27 with hyperglycemia hyperosmolar syndrome  -Hgb A1C 9.2 on 5/20/22  -Glucose per labs today 265  -Endocrine consulted for better control      Uninterrupted Critical Care/Counseling Time (not including " procedures): 60 minutes      DEYA Martinez  Heart Transplant  [facility] - Cardiac Intensive Care

## 2022-06-23 NOTE — SUBJECTIVE & OBJECTIVE
**Interval History: CI dropped overnight and Epi increased to 0.04.  Started on empiric Vanc yesterday for positive blood culture on 6/20.  Repeat cultures on 6/21 positive.  ID consulted and following.  IABP exchanged at bedside today.  Will likely exchange central line as well.   He is net negative 3.8L in the last 24 hours.  CVP: 15, SVO2: 58, CO: 6.47, CI: 2.86, SVR: 1013.  Creatinine is stable at 1.7 (down from 1.8 yesterday).  Will continue current regimen for now. Will await further recommendations from ID     Lines:  IABP 6/23  Left IJ: 6/20    Continuous Infusions:   sodium chloride 0.9% Stopped (06/23/22 0358)    EPINEPHrine 0.04 mcg/kg/min (06/23/22 0900)    furosemide (LASIX) 10 mg/mL infusion (non-titrating) 30 mg/hr (06/23/22 0900)    heparin (porcine) in D5W Stopped (06/23/22 0834)    milrinone 20mg/100ml D5W (200mcg/ml) 0.375 mcg/kg/min (06/23/22 0900)    nitric oxide gas       Scheduled Meds:   acetaminophen  650 mg Oral Q8H    aspirin  81 mg Oral Daily    busPIRone  7.5 mg Oral Q12H    gabapentin  300 mg Oral TID    HYDROcodone-acetaminophen  1 tablet Oral Q8H    insulin detemir U-100  20 Units Subcutaneous Daily    LIDOcaine HCL 20 mg/ml (2%)  20 mL Other Once    magnesium oxide  400 mg Oral BID    methocarbamoL  500 mg Oral QID    mirtazapine  15 mg Oral Nightly    pantoprazole  40 mg Oral Daily    polyethylene glycol  17 g Oral BID    potassium chloride  30 mEq Oral BID    senna-docusate 8.6-50 mg  1 tablet Oral Daily    sucralfate  1 g Oral Nightly    vancomycin (VANCOCIN) IVPB  1,000 mg Intravenous Q12H     PRN Meds:sodium chloride, sodium chloride, sodium chloride, ALPRAZolam, bisacodyL, dextrose 10%, dextrose 10%, diphenhydrAMINE, glucagon (human recombinant), insulin aspart U-100, magnesium oxide, magnesium oxide, mupirocin, potassium bicarbonate, potassium bicarbonate, potassium bicarbonate, potassium, sodium phosphates, potassium, sodium phosphates, potassium, sodium phosphates,  promethazine, sodium chloride 0.9%, sodium chloride 0.9%, Pharmacy to dose Vancomycin consult **AND** vancomycin - pharmacy to dose    Review of patient's allergies indicates:   Allergen Reactions    Aspirin Other (See Comments)     Mr. Thacker is enrolled in Dr. Paige's Alon Trial and cannot have any aspirin and/or aspirin-containing products. DO NOT cancel any orders for INV Aspirin 100 mg/Placebo. If you have questions, please contact Isabel @ [phone], [phone],[email or web address], secure chat or MS Teams message.    Bumex [bumetanide] Hives    Lactose Diarrhea     Other reaction(s): Abdominal distension, gaseous    Torsemide Hives     Objective:     Vital Signs (Most Recent):  Temp: 98.5 °F (36.9 °C) (06/23/22 0700)  Pulse: (!) 111 (06/23/22 0900)  Resp: 18 (06/23/22 0944)  BP: 127/62 (06/23/22 0900)  SpO2: 99 % (06/23/22 0900)   Vital Signs (24h Range):  Temp:  [98 °F (36.7 °C)-98.7 °F (37.1 °C)] 98.5 °F (36.9 °C)  Pulse:  [100-116] 111  Resp:  [11-25] 18  SpO2:  [95 %-100 %] 99 %  BP: (108-142)/(62-86) 127/62     Patient Vitals for the past 72 hrs (Last 3 readings):   Weight   06/22/22 0300 96.2 kg (212 lb 1.3 oz)   06/21/22 0900 97.5 kg (215 lb)   06/21/22 0612 97.7 kg (215 lb 6.2 oz)       Body mass index is 26.51 kg/m².      Intake/Output Summary (Last 24 hours) at 6/23/2022 1138  Last data filed at 6/23/2022 0900  Gross per 24 hour   Intake 1757.45 ml   Output 6145 ml   Net -4387.55 ml         Hemodynamic Parameters:       Telemetry: ST    Physical Exam  Constitutional:       Appearance: Normal appearance.   HENT:      Head: Normocephalic and atraumatic.   Eyes:      Conjunctiva/sclera: Conjunctivae normal.      Pupils: Pupils are equal, round, and reactive to light.   Neck:      Comments: [facility] TLC  Cardiovascular:      Rate and Rhythm: Regular rhythm. Tachycardia present.      Comments: +S3  Pulmonary:      Effort: Pulmonary effort is normal.      Breath sounds: Normal breath sounds.   Abdominal:       General: Bowel sounds are normal. There is distension.   Musculoskeletal:         General: No swelling. Normal range of motion.      Cervical back: Neck supple.   Skin:     General: Skin is warm and dry.      Capillary Refill: Capillary refill takes 2 to 3 seconds.   Neurological:      General: No focal deficit present.      Mental Status: He is alert and oriented to person, place, and time.   Psychiatric:         Mood and Affect: Mood normal.         Behavior: Behavior normal.         Thought Content: Thought content normal.         Judgment: Judgment normal.       Significant Labs:  CBC:  Recent Labs   Lab 06/22/22  0344 06/22/22  1347 06/23/22  0246   WBC 12.93* 12.11 11.31   RBC 3.46* 3.42* 3.37*   HGB 10.1* 10.0* 9.9*   HCT 30.3* 29.6* 29.7*    376 383   MCV 88 87 88   MCH 29.2 29.2 29.4   MCHC 33.3 33.8 33.3       BNP:  Recent Labs   Lab 06/17/22  0332   *       CMP:  Recent Labs   Lab 06/21/22  0418 06/21/22  1459 06/22/22  0344 06/22/22  1347 06/23/22  0246   *   < > 210* 331* 352*   CALCIUM 9.7   < > 9.8 9.7 9.5   ALBUMIN 3.3*  --  3.4*  --  3.4*   PROT 7.7  --  8.0  --  7.9   *   < > 132* 130* 129*   K 3.9   < > 3.6 3.6 3.8   CO2 29   < > 30* 33* 31*   CL 94*   < > 89* 87* 87*   BUN 25*   < > 32* 37* 39*   CREATININE 1.5*   < > 1.8* 1.8* 1.7*   ALKPHOS 106  --  113  --  117   ALT 22  --  18  --  21   AST 26  --  24  --  23   BILITOT 0.8  --  1.3*  --  1.0    < > = values in this interval not displayed.        Coagulation:   Recent Labs   Lab 06/20/22  1607 06/21/22  0418 06/22/22  0344 06/22/22  1347 06/23/22  0246   INR 1.0  --   --   --   --    APTT  --    < > 46.1* 49.5* 49.0*    < > = values in this interval not displayed.       LDH:  Recent Labs   Lab 06/23/22  0246   *     Microbiology:  Microbiology Results (last 7 days)       Procedure Component Value Units Date/Time    Blood culture [[id number]]  (Abnormal) Collected: 06/21/22 7370    Order Status: Completed  Specimen: Blood from Peripheral, Wrist, Left Updated: 06/23/22 1036     Blood Culture, Routine Gram stain dudley bottle: Gram positive cocci      Results called to and read back by: Arnulfo Herrera RN. 06/22/2022  17:46      Gram stain aer bottle: Gram positive cocci in clusters resembling Staph       Positive results previously called 06/23/2022  00:11      STAPHYLOCOCCUS SPECIES  Identification and susceptibility pending      Blood culture [[id number]] Collected: 06/23/22 0108    Order Status: Completed Specimen: Blood from Peripheral, Hand, Right Updated: 06/23/22 0945     Blood Culture, Routine No Growth to date    Blood culture [[id number]] Collected: 06/23/22 0105    Order Status: Completed Specimen: Blood from Peripheral, Antecubital, Right Updated: 06/23/22 0945     Blood Culture, Routine No Growth to date    Blood culture [[id number]]  (Abnormal) Collected: 06/20/22 1651    Order Status: Completed Specimen: Blood from Peripheral, Hand, Left Updated: 06/23/22 0750     Blood Culture, Routine Gram stain dudley bottle: Gram positive cocci in clusters resembling Staph      Results called to and read back by:Tara López RN 06/21/2022  22:16      STAPHYLOCOCCUS SPECIES  Identification and susceptibility pending      Blood culture [[id number]] Collected: 06/21/22 2250    Order Status: Completed Specimen: Blood from Peripheral, Wrist, Left Updated: 06/23/22 0433     Blood Culture, Routine Gram stain dudley bottle: Gram positive cocci in clusters resembling Staph      Results called to and read back by: Rosa Pardo RN. 06/22/2022  23:29      Gram stain aer bottle: Gram positive cocci in clusters resembling Staph       Positive results previously called 06/23/2022  04:33    Blood culture [[id number]]     Order Status: Canceled Specimen: Blood             I have reviewed all pertinent labs within the past 24 hours.    Estimated Creatinine Clearance: 71.1 mL/min (A) (based on SCr of 1.7 mg/dL (H)).    Diagnostic Results:  I have  reviewed and interpreted all pertinent imaging results/findings within the past 24 hours.

## 2022-06-23 NOTE — NURSING
Pt c/o muscle cramps, requesting additional dose of muscle relaxer. MD Silva notified. OK for additional dose of methocarbamol 500 mg. AM labs sent early. GLENNY.

## 2022-06-23 NOTE — HPI
"36 yo male with NIDCM with BiV systolic heart failure, on home Milrinone at 0.375 mcg/kg/min, presented at [facility] 4/2/22 ,was not evaluated for OHT as he has recently quit smoking in April 2022 but was approved for VAD with plan to begin OHT evaluation in upcoming months if Mr Queen is stable and suitable for OHT eval (blood group A), 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 Went to ED at [facility] 6/10 but left after waiting 4 hours. Had clinic appointment with us today, but arrived to [facility] early this morning and decided to go to the ED instead. Baseline Lasix dose is 80 mg bid. Reports taking 240 mg qd past 3 days with no improvement. BNP is 1701, up from 898 on 6/2 and 49 on 5/24. sCr is 1.8 with baseline ~ 1.5-1.7. sPO2 on RA is 93%.   "

## 2022-06-23 NOTE — CONSULTS
"[facility] - Cardiac Intensive Care  Interventional Cardiology  Consult Note    Patient Name: Kevan Queen  MRN: [id number]  Admission Date: 6/13/2022  Hospital Length of Stay: 10 days  Code Status: Full Code   Attending Provider: Luca Lopez Jr.,*  Consulting Provider: Anita Ovalles MD  Primary Care Physician: ORALIA Cline  Principal Problem:Acute on chronic combined systolic and diastolic heart failure, NYHA class 4    Patient information was obtained from patient, past medical records and ER records.     Inpatient consult to Interventional Cardiology  Consult performed by: Anita Ovalles MD  Consult ordered by: DEYA Martinez  Reason for consult: IABP exchange        Subjective:     Chief Complaint:       HPI:  38 yo male with NIDCM with BiV systolic heart failure, on home Milrinone at 0.375 mcg/kg/min, presented at [facility] 4/2/22 ,was not evaluated for OHT as he has recently quit smoking in April 2022 but was approved for VAD with plan to begin OHT evaluation in upcoming months if Mr Queen is stable and suitable for OHT eval (blood group A), 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 Went to ED at [facility] 6/10 but left after waiting 4 hours. Had clinic appointment with us today, but arrived to [facility] early this morning and decided to go to the ED instead. Baseline Lasix dose is 80 mg bid. Reports taking 240 mg qd past 3 days with no improvement. BNP is 1701, up from 898 on 6/2 and 49 on 5/24. sCr is 1.8 with baseline ~ 1.5-1.7. sPO2 on RA is 93%.       Past Medical History:   Diagnosis Date    Arthritis     Cardiomyopathy     CHF (congestive heart " failure) 10/01/2020    Diabetes mellitus     Dilated cardiomyopathy 10/26/2020    Drug abuse 10/2020    Hyperosmolar hyperglycemic state ([facility]) 5/25/2022    ICD (implantable cardioverter-defibrillator) in place 10/26/2020    Renal disorder        Past Surgical History:   Procedure Laterality Date    CARDIAC DEFIBRILLATOR PLACEMENT      RIGHT HEART CATHETERIZATION Right 4/8/2022    Procedure: INSERTION, CATHETER, RIGHT HEART;  Surgeon: Luca Lopez Jr., MD;  Location: [facility] CATH LAB;  Service: Cardiology;  Laterality: Right;    RIGHT HEART CATHETERIZATION Right 4/19/2022    Procedure: INSERTION, CATHETER, RIGHT HEART;  Surgeon: Josh Pulido MD;  Location: [facility] CATH LAB;  Service: Cardiology;  Laterality: Right;       Review of patient's allergies indicates:   Allergen Reactions    Aspirin Other (See Comments)     Mr. Thacker is enrolled in Dr. Paige's Alon Trial and cannot have any aspirin and/or aspirin-containing products. DO NOT cancel any orders for INV Aspirin 100 mg/Placebo. If you have questions, please contact Isabel @ [phone], [phone],[email or web address], secure chat or MS Teams message.    Bumex [bumetanide] Hives    Lactose Diarrhea     Other reaction(s): Abdominal distension, gaseous    Torsemide Hives       PTA Medications   Medication Sig    busPIRone (BUSPAR) 7.5 MG tablet Take 7.5 mg by mouth every 12 (twelve) hours.    EScitalopram oxalate (LEXAPRO) 5 MG Tab Take 1 tablet (5 mg total) by mouth once daily.    furosemide (LASIX) 80 MG tablet Take 80 mg by mouth 2 (two) times daily.    insulin aspart U-100 (NOVOLOG) 100 unit/mL (3 mL) InPn pen Inject 12 Units into the skin 3 (three) times daily.    insulin detemir U-100 (LEVEMIR FLEXTOUCH) 100 unit/mL (3 mL) SubQ InPn pen Inject 23 Units into the skin once daily.    mirtazapine (REMERON) 15 MG tablet Take 15 mg by mouth nightly.    pantoprazole (PROTONIX) 40 MG tablet Take 1 tablet (40 mg total) by mouth once  "daily.    potassium chloride SA (K-DUR,KLOR-CON) 20 MEQ tablet Take 2 tablets (40 mEq total) by mouth once daily.    sucralfate (CARAFATE) 1 gram tablet Take 1 g by mouth nightly.    albuterol (PROVENTIL/VENTOLIN HFA) 90 mcg/actuation inhaler INHALE 2 PUFFS BY MOUTH EVERY 4 HOURS AS NEEDED FOR COUGH OR WHEEZING    blood sugar diagnostic Strp Use to test blood glucose 4 (four) times daily.    blood-glucose meter Misc Use as instructed    lancets 33 gauge Misc Use to test blood glucose 4 (four) times daily.    milrinone (PRIMACOR) 1 mg/mL injection Inject into the vein.    milrinone 20mg/100ml D5W, 200mcg/ml, (PRIMACOR) 20 mg/100 mL (200 mcg/mL) infusion Inject 34.875 mcg/min into the vein continuous.    pen needle, diabetic 31 gauge x 1/4" Ndle 1 Device by Misc.(Non-Drug; Combo Route) route 4 (four) times daily.    promethazine (PHENERGAN) 12.5 MG Tab Take 1 tablet (12.5 mg total) by mouth every 6 (six) hours as needed (nausea).    traMADoL (ULTRAM) 50 mg tablet Take 1 tablet (50 mg total) by mouth every 6 (six) hours as needed for Pain.     Family History       Problem Relation (Age of Onset)    Diverticulosis Brother    Heart attack Maternal Grandmother, Maternal Grandfather    Heart failure Father          Tobacco Use    Smoking status: Former Smoker     Packs/day: 0.50     Years: 16.00     Pack years: 8.00     Start date: 10/1/2004     Quit date: 2021     Years since quittin.1    Smokeless tobacco: Never Used   Substance and Sexual Activity    Alcohol use: Not Currently    Drug use: Not Currently     Types: Marijuana, MDMA (Ecstacy)    Sexual activity: Yes     Partners: Female     Birth control/protection: None     Review of Systems   All other systems reviewed and are negative.  Objective:     Vital Signs (Most Recent):  Temp: 98 °F (36.7 °C) (22 0302)  Pulse: (!) 112 (22 06)  Resp: (!) 23 (22)  BP: 122/71 (22)  SpO2: 97 % (22)   Vital Signs " (24h Range):  Temp:  [98 °F (36.7 °C)-98.7 °F (37.1 °C)] 98 °F (36.7 °C)  Pulse:  [100-119] 112  Resp:  [11-25] 23  SpO2:  [95 %-100 %] 97 %  BP: (103-142)/(62-86) 122/71     Weight: 96.2 kg (212 lb 1.3 oz)  Body mass index is 26.51 kg/m².    SpO2: 97 %  O2 Device (Oxygen Therapy): nasal cannula w/ humidification      Intake/Output Summary (Last 24 hours) at 6/23/2022 0704  Last data filed at 6/23/2022 0502  Gross per 24 hour   Intake 1702.28 ml   Output 5625 ml   Net -3922.72 ml       Lines/Drains/Airways       Central Venous Catheter Line  Duration             Percutaneous Central Line Insertion/Assessment - Triple Lumen  06/20/22 1458 left internal jugular 2 days              Drain  Duration             Male External Urinary Catheter 06/14/22 0000 9 days              Line  Duration                  IABP 06/13/22 2229 8.0 Fr. 40 mL 9 days                    Physical Exam  Constitutional:       Appearance: Normal appearance.   HENT:      Head: Normocephalic.      Nose: Nose normal.   Eyes:      Extraocular Movements: Extraocular movements intact.      Pupils: Pupils are equal, round, and reactive to light.   Neck:      Comments: JVP at clavicle   Cardiovascular:      Rate and Rhythm: Normal rate and regular rhythm.   Pulmonary:      Effort: Pulmonary effort is normal.      Breath sounds: Normal breath sounds.   Abdominal:      General: Abdomen is flat. Bowel sounds are normal.      Palpations: Abdomen is soft.   Musculoskeletal:      Cervical back: Normal range of motion.   Skin:     General: Skin is warm.      Capillary Refill: Capillary refill takes less than 2 seconds.   Neurological:      General: No focal deficit present.      Mental Status: He is alert and oriented to person, place, and time.       Significant Labs: All pertinent lab results from the last 24 hours have been reviewed.        Assessment and Plan:     Cardiogenic shock  Plan for IABP exchange     - Access: Right Femoral artery  -  Creatinine/CrCl: 1.7  - Allergies: No shellfish / Iodine allergy  - Pre-Op Med: Bendaryl 50mg pO   - All patient's questions were answered.  -The risks, benefits and alternatives of the procedure were explained to the patient.   -The risks of IABP include but are not limited to: bleeding, infection,, kidney injury   -The risks of moderate sedation include hypotension, respiratory depression, arrhythmias, bronchospasm, and death.   - Informed consent was obtained and the  patient is agreeable to proceed with the procedure.        VTE Risk Mitigation (From admission, onward)         Ordered     heparin 25,000 units in dextrose 5% 250 ml (100 units/mL) infusion MINIMAL INTENSITY nomogram - [facility]  Continuous        Question Answer Comment   Heparin Infusion Adjustment (DO NOT MODIFY ANSWER) [email or web address] [email or web address]  INTENSITY nomogram for [facility] [id number]    Begin at (in units/kg/hr) 12        06/13/22 2252     Place JUDE hose  Until discontinued         06/13/22 1301     IP VTE HIGH RISK PATIENT  Once         06/13/22 1301     Place sequential compression device  Until discontinued         06/13/22 1301                Thank you for your consult. I will follow-up with patient. Please contact us if you have any additional questions.    Anita Ovalles MD  Interventional Cardiology   [facility] - Cardiac Intensive [facility]

## 2022-06-23 NOTE — PT/OT/SLP PROGRESS
Physical Therapy      Patient Name:  Kevan Queen   MRN:  [id number]    Patient not seen today secondary to  (pt. on bedrest due to femoral balloon pump removed). Will follow-up tomorrow.    Otoniel Daniel, PT  6/23/2022

## 2022-06-23 NOTE — CARE UPDATE
Care Update:     No acute events overnight. Patient on the CICU in room [id number] A. Blood glucose improving. BG at and above goal on current insulin regimen (SSI + basal). Endocrine not notified that diet was resumed when surgery was delayed; therefore patient did not receive prandial insulin with SSI. Patient NPO today for surgery, if surgery is delayed and diet is resumed please notify endocrine. Steroid use- None. Day of Surgery  Renal function- Abnormal - Creatinine 1.7   Vasopressors-  Epinephrine       Diet NPO  Diet NPO     POCT Glucose   Date Value Ref Range Status   06/23/2022 273 (H) 70 - 110 mg/dL Final   06/23/2022 336 (H) 70 - 110 mg/dL Final   06/23/2022 331 (H) 70 - 110 mg/dL Final   06/23/2022 349 (H) 70 - 110 mg/dL Final   06/22/2022 285 (H) 70 - 110 mg/dL Final   06/22/2022 236 (H) 70 - 110 mg/dL Final   06/22/2022 165 (H) 70 - 110 mg/dL Final   06/21/2022 182 (H) 70 - 110 mg/dL Final   06/21/2022 295 (H) 70 - 110 mg/dL Final   06/21/2022 201 (H) 70 - 110 mg/dL Final   06/21/2022 227 (H) 70 - 110 mg/dL Final   06/20/2022 259 (H) 70 - 110 mg/dL Final   06/20/2022 166 (H) 70 - 110 mg/dL Final   06/20/2022 232 (H) 70 - 110 mg/dL Final     Lab Results   Component Value Date    HGBA1C 9.2 (H) 05/20/2022     Endocrinology consulted for BG management.   BG goal 140-180     - Levemir Flex Pen 20 units daily  - Novolog (aspart) insulin prn for BG excursions MDC SSI (150/25).   - BG checks q4hr  - Hypoglycemia protocol in place     - Will likely require IIP s/p LVAD  ** Please notify Endocrine for any change and/or advance in diet**  ** Please call Endocrine for any BG related issues **     Discharge Planning:   TBD. Please notify endocrinology prior to discharge.         Michael Wyman DNP, FNP-C  Department of Endocrinology  Inpatient Glycemic Management

## 2022-06-23 NOTE — ASSESSMENT & PLAN NOTE
"-[facility]  -Approved for LVAD at [facility] on 4/2/22. Was not evaluated for OHTx as he quit smoking in April 2022  -Moved to ICU 6/13 in the setting of significant volume overloaded and low output state. IABP placed 6/13 and Epi and Marcella added for RV support  -Lasix gtt D/C"d 6/17 2/2 increase in sCr. Given 250 cc IV NS and sCr improved  -lasix drip restarted 6/23 at 40 mg/hr and now at 30mg/hr. Epi increased overnight to 0.04.  CVP: 15, SVO 58, CO: 6.47, CI:2.86, SVR: 1013. -IABP 1:1, IV milrinone 0.375, epi 0.04, and Marcella 10 ppm.   -TTE 6/14: LVEF 10%, G3DD, mild-moderate reduced RVSF, moderate-severe MR, moderate-sever tr, CVP 15, PASP 56, moderate PH, LVEDD 7.59  -Repeat Echo 6/21 EF: 15%, LVEDD: 7.14, TAPSE: 1.29 severe MR, mod TR. Mild RVE with mod reduced RV failure   -RHC done 4/19/22 on Milrinone 0.5 mcg/kg/min: RA 11, PA 50/27 (35), W 27, CO/CI 3.7/1.7, PVR 2.2 and SVR 1535   -GDMT: Entresto and Aldactone on hold since admit 5/24-5/27 2/2 elevated sCr  -Reports dry weight is 217#, and that he had gained 6# on his home scale PTA  - VAD implant today. Unlikely OHTx candidate with smoking history (quit April 2022)         "

## 2022-06-23 NOTE — SUBJECTIVE & OBJECTIVE
Interval History:  Repeat blood cultures positive for Staph epi  IABP exchanged to left fem site  Patient reports tenderness in right fem resolved, now with tenderness in left fem    Review of Systems   Constitutional:  Negative for chills, diaphoresis and fever.   HENT:  Negative for rhinorrhea and sore throat.    Respiratory:  Negative for cough and shortness of breath.    Cardiovascular:  Negative for chest pain and leg swelling.   Gastrointestinal:  Negative for abdominal pain, diarrhea, nausea and vomiting.   Genitourinary:  Negative for dysuria and hematuria.   Musculoskeletal:  Negative for arthralgias and myalgias.   Skin:  Negative for rash.   Neurological:  Negative for headaches.   Objective:     Vital Signs (Most Recent):  Temp: 98.7 °F (37.1 °C) (06/23/22 1500)  Pulse: 104 (06/23/22 1600)  Resp: 16 (06/23/22 1600)  BP: 112/70 (06/23/22 1600)  SpO2: 99 % (06/23/22 1600) Vital Signs (24h Range):  Temp:  [98 °F (36.7 °C)-98.7 °F (37.1 °C)] 98.7 °F (37.1 °C)  Pulse:  [100-121] 104  Resp:  [11-24] 16  SpO2:  [93 %-100 %] 99 %  BP: (107-142)/(56-82) 112/70     Weight: 96.2 kg (212 lb 1.3 oz)  Body mass index is 26.51 kg/m².    Estimated Creatinine Clearance: 63.6 mL/min (A) (based on SCr of 1.9 mg/dL (H)).    Physical Exam  Vitals reviewed.   Constitutional:       General: He is not in acute distress.     Appearance: He is well-developed. He is not diaphoretic.   HENT:      Head: Normocephalic and atraumatic.   Eyes:      Conjunctiva/sclera: Conjunctivae normal.   Neck:      Comments: LIJ line  Pulmonary:      Effort: Pulmonary effort is normal. No respiratory distress.   Abdominal:      General: There is no distension.      Palpations: Abdomen is soft.   Musculoskeletal:         General: Normal range of motion.   Skin:     General: Skin is warm and dry.      Findings: No erythema or rash.      Comments: Prior right fem line without any tenderness. Left fem line in place   Neurological:      Mental Status: He  is alert and oriented to person, place, and time.   Psychiatric:         Behavior: Behavior normal.       Significant Labs: All pertinent labs within the past 24 hours have been reviewed.    Significant Imaging: I have reviewed all pertinent imaging results/findings within the past 24 hours.

## 2022-06-23 NOTE — CARE UPDATE
Hemodynamics Care Update Note    SVO2:  46  CVP: 15  CO:  4.8  CI: 2.1  SVR: 1177   I: 1.03 O: 3.6   (calculated using oxygen consumption constant of 3.5)       Plan:  - Will increase Epi to .04   - POC lactate ordered     Case discussed with on call HTS attending.    Minor Silva, PGY-4  Cardiovascular Disease  [facility]

## 2022-06-23 NOTE — PROGRESS NOTES
[facility]  Infectious Disease  Progress Note    Patient Name: Kevan Queen  MRN: [id number]  Admission Date: 6/13/2022  Length of Stay: 10 days  Attending Physician: Luca Lopez Jr.,*  Primary Care Provider: ORALIA Cline    Isolation Status: No active isolations  Assessment/Plan:      Staphylococcus epidermidis bacteremia  37-year-old male with history of NIDCM with BiV systolic heart failure on home milrinone presented 6/13/2022 for ADHF, IABP placed 6/13/2022, now with Staph epi bacteremia, vancomycin initiated 6/22/2022. IABP and IJ line exchanged on 6/23/2022.    Recommendations:  - Continue vancomycin IV, goal 15-20  - Follow-up blood cultures ID and susceptibilities  - Follow-up repeat blood cultures from 6/23/2022   - If blood cultures remain positive, consider GUILLERMO to evaluate for ICD endocarditis          Thank you for your consult. I will follow-up with patient. Please contact us if you have any additional questions.    Kavya Jeffrey MD  Infectious Disease  [facility] - [facility] Int[facility]    Subjective:     Principal Problem:Acute on chronic combined systolic and diastolic heart failure, NYHA class 4    HPI: 37-year-old male with history of NIDCM with BiV systolic heart failure on home milrinone presented 6/13/2022 for ADHF. Patient not current candidate for OHT as recently quit smoking 4/2022. IABP placed 6/13/2022, central line exchanged 6/20/2022. Blood culture x1 drawn on 6/20/2022, returned with GPC. Repeat blood cultures drawn 6/21/2022 x2. Patient reports tenderness around right femoral IABP - started once IABP was placed. Denied any worsening pain around right femoral line.    Interval History:  Repeat blood cultures positive for Staph epi  IABP exchanged to left fem site  Patient reports tenderness in right fem resolved, now with tenderness in left fem    Review of Systems   Constitutional:  Negative for chills, diaphoresis and fever.   HENT:  Negative for  rhinorrhea and sore throat.    Respiratory:  Negative for cough and shortness of breath.    Cardiovascular:  Negative for chest pain and leg swelling.   Gastrointestinal:  Negative for abdominal pain, diarrhea, nausea and vomiting.   Genitourinary:  Negative for dysuria and hematuria.   Musculoskeletal:  Negative for arthralgias and myalgias.   Skin:  Negative for rash.   Neurological:  Negative for headaches.   Objective:     Vital Signs (Most Recent):  Temp: 98.7 °F (37.1 °C) (06/23/22 1500)  Pulse: 104 (06/23/22 1600)  Resp: 16 (06/23/22 1600)  BP: 112/70 (06/23/22 1600)  SpO2: 99 % (06/23/22 1600) Vital Signs (24h Range):  Temp:  [98 °F (36.7 °C)-98.7 °F (37.1 °C)] 98.7 °F (37.1 °C)  Pulse:  [100-121] 104  Resp:  [11-24] 16  SpO2:  [93 %-100 %] 99 %  BP: (107-142)/(56-82) 112/70     Weight: 96.2 kg (212 lb 1.3 oz)  Body mass index is 26.51 kg/m².    Estimated Creatinine Clearance: 63.6 mL/min (A) (based on SCr of 1.9 mg/dL (H)).    Physical Exam  Vitals reviewed.   Constitutional:       General: He is not in acute distress.     Appearance: He is well-developed. He is not diaphoretic.   HENT:      Head: Normocephalic and atraumatic.   Eyes:      Conjunctiva/sclera: Conjunctivae normal.   Neck:      Comments: LIJ line  Pulmonary:      Effort: Pulmonary effort is normal. No respiratory distress.   Abdominal:      General: There is no distension.      Palpations: Abdomen is soft.   Musculoskeletal:         General: Normal range of motion.   Skin:     General: Skin is warm and dry.      Findings: No erythema or rash.      Comments: Prior right fem line without any tenderness. Left fem line in place   Neurological:      Mental Status: He is alert and oriented to person, place, and time.   Psychiatric:         Behavior: Behavior normal.       Significant Labs: All pertinent labs within the past 24 hours have been reviewed.    Significant Imaging: I have reviewed all pertinent imaging results/findings within the past 24  hours.

## 2022-06-23 NOTE — ASSESSMENT & PLAN NOTE
-Admit sCr 1.8, baseline ~ 1.5-1.7  -creatinine elevated to 2.0 but improved after fluid.    -creatinine has been stable and is 1.7 today  -Will monitor response

## 2022-06-23 NOTE — SUBJECTIVE & OBJECTIVE
Past Medical History:   Diagnosis Date    Arthritis     Cardiomyopathy     CHF (congestive heart failure) 10/01/2020    Diabetes mellitus     Dilated cardiomyopathy 10/26/2020    Drug abuse 10/2020    Hyperosmolar hyperglycemic state (HHS) 5/25/2022    ICD (implantable cardioverter-defibrillator) in place 10/26/2020    Renal disorder        Past Surgical History:   Procedure Laterality Date    CARDIAC DEFIBRILLATOR PLACEMENT      RIGHT HEART CATHETERIZATION Right 4/8/2022    Procedure: INSERTION, CATHETER, RIGHT HEART;  Surgeon: Luca Lopez Jr., MD;  Location: [facility] CATH LAB;  Service: Cardiology;  Laterality: Right;    RIGHT HEART CATHETERIZATION Right 4/19/2022    Procedure: INSERTION, CATHETER, RIGHT HEART;  Surgeon: Josh Pulido MD;  Location: [facility] CATH LAB;  Service: Cardiology;  Laterality: Right;       Review of patient's allergies indicates:   Allergen Reactions    Aspirin Other (See Comments)     Mr. Thacker is enrolled in Dr. Paige's Alon Trial and cannot have any aspirin and/or aspirin-containing products. DO NOT cancel any orders for INV Aspirin 100 mg/Placebo. If you have questions, please contact Isabel @ [phone], [phone],[email or web address], secure chat or MS Teams message.    Bumex [bumetanide] Hives    Lactose Diarrhea     Other reaction(s): Abdominal distension, gaseous    Torsemide Hives       PTA Medications   Medication Sig    busPIRone (BUSPAR) 7.5 MG tablet Take 7.5 mg by mouth every 12 (twelve) hours.    EScitalopram oxalate (LEXAPRO) 5 MG Tab Take 1 tablet (5 mg total) by mouth once daily.    furosemide (LASIX) 80 MG tablet Take 80 mg by mouth 2 (two) times daily.    insulin aspart U-100 (NOVOLOG) 100 unit/mL (3 mL) InPn pen Inject 12 Units into the skin 3 (three) times daily.    insulin detemir U-100 (LEVEMIR FLEXTOUCH) 100 unit/mL (3 mL) SubQ InPn pen Inject 23 Units into the skin once daily.    mirtazapine (REMERON) 15 MG tablet Take 15 mg by mouth nightly.     "pantoprazole (PROTONIX) 40 MG tablet Take 1 tablet (40 mg total) by mouth once daily.    potassium chloride SA (K-DUR,KLOR-CON) 20 MEQ tablet Take 2 tablets (40 mEq total) by mouth once daily.    sucralfate (CARAFATE) 1 gram tablet Take 1 g by mouth nightly.    albuterol (PROVENTIL/VENTOLIN HFA) 90 mcg/actuation inhaler INHALE 2 PUFFS BY MOUTH EVERY 4 HOURS AS NEEDED FOR COUGH OR WHEEZING    blood sugar diagnostic Strp Use to test blood glucose 4 (four) times daily.    blood-glucose meter Misc Use as instructed    lancets 33 gauge Misc Use to test blood glucose 4 (four) times daily.    milrinone (PRIMACOR) 1 mg/mL injection Inject into the vein.    milrinone 20mg/100ml D5W, 200mcg/ml, (PRIMACOR) 20 mg/100 mL (200 mcg/mL) infusion Inject 34.875 mcg/min into the vein continuous.    pen needle, diabetic 31 gauge x 1/4" Ndle 1 Device by Misc.(Non-Drug; Combo Route) route 4 (four) times daily.    promethazine (PHENERGAN) 12.5 MG Tab Take 1 tablet (12.5 mg total) by mouth every 6 (six) hours as needed (nausea).    traMADoL (ULTRAM) 50 mg tablet Take 1 tablet (50 mg total) by mouth every 6 (six) hours as needed for Pain.     Family History       Problem Relation (Age of Onset)    Diverticulosis Brother    Heart attack Maternal Grandmother, Maternal Grandfather    Heart failure Father          Tobacco Use    Smoking status: Former Smoker     Packs/day: 0.50     Years: 16.00     Pack years: 8.00     Start date: 10/1/2004     Quit date: 2021     Years since quittin.1    Smokeless tobacco: Never Used   Substance and Sexual Activity    Alcohol use: Not Currently    Drug use: Not Currently     Types: Marijuana, MDMA (Ecstacy)    Sexual activity: Yes     Partners: Female     Birth control/protection: None     Review of Systems   All other systems reviewed and are negative.  Objective:     Vital Signs (Most Recent):  Temp: 98 °F (36.7 °C) (22 0302)  Pulse: (!) 112 (22 0602)  Resp: (!) 23 (22 0602)  BP: " 122/71 (06/23/22 0602)  SpO2: 97 % (06/23/22 0602)   Vital Signs (24h Range):  Temp:  [98 °F (36.7 °C)-98.7 °F (37.1 °C)] 98 °F (36.7 °C)  Pulse:  [100-119] 112  Resp:  [11-25] 23  SpO2:  [95 %-100 %] 97 %  BP: (103-142)/(62-86) 122/71     Weight: 96.2 kg (212 lb 1.3 oz)  Body mass index is 26.51 kg/m².    SpO2: 97 %  O2 Device (Oxygen Therapy): nasal cannula w/ humidification      Intake/Output Summary (Last 24 hours) at 6/23/2022 0704  Last data filed at 6/23/2022 0502  Gross per 24 hour   Intake 1702.28 ml   Output 5625 ml   Net -3922.72 ml       Lines/Drains/Airways       Central Venous Catheter Line  Duration             Percutaneous Central Line Insertion/Assessment - Triple Lumen  06/20/22 1458 left internal jugular 2 days              Drain  Duration             Male External Urinary Catheter 06/14/22 0000 9 days              Line  Duration                  IABP 06/13/22 2229 8.0 Fr. 40 mL 9 days                    Physical Exam  Constitutional:       Appearance: Normal appearance.   HENT:      Head: Normocephalic.      Nose: Nose normal.   Eyes:      Extraocular Movements: Extraocular movements intact.      Pupils: Pupils are equal, round, and reactive to light.   Neck:      Comments: JVP at clavicle   Cardiovascular:      Rate and Rhythm: Normal rate and regular rhythm.   Pulmonary:      Effort: Pulmonary effort is normal.      Breath sounds: Normal breath sounds.   Abdominal:      General: Abdomen is flat. Bowel sounds are normal.      Palpations: Abdomen is soft.   Musculoskeletal:      Cervical back: Normal range of motion.   Skin:     General: Skin is warm.      Capillary Refill: Capillary refill takes less than 2 seconds.   Neurological:      General: No focal deficit present.      Mental Status: He is alert and oriented to person, place, and time.       Significant Labs: All pertinent lab results from the last 24 hours have been reviewed.

## 2022-06-23 NOTE — ASSESSMENT & PLAN NOTE
-Blood cultures drawn 6/20 positive for gram + cocci  -PICC line removed and IJ exchanged on 6/20  -Started on Empiric Vanc 6/22  -Repeat cultures from 6/21 now also positive  -patient has been afebrile  -ID consulted given upcoming planned LVAD   -IABP exchanged at bedside 6/23 and will plan to exchange line placed on 6/20

## 2022-06-23 NOTE — PROGRESS NOTES
06/23/2022  Enrique NANDO Awan Jr    Current provider:  Luca Lopez Jr.,*    Device interrogation:  No flowsheet data found.       Rounded on Kevanmohit Queen to ensure all mechanical assist device settings (IABP or VAD) were appropriate and all parameters were within limits.  I was able to ensure all back up equipment was present, the staff had no issues, and the Perfusion Department daily rounding was complete.  IABP catheter exchanged this morning.    For implantable VADs: Interrogation of Ventricular assist device was performed with analysis of device parameters and review of device function. I have personally reviewed the interrogation findings and agree with findings as stated.     In emergency, the nursing units have been notified to contact the perfusion department either by:  Calling [phone] from 630am to 4pm Mon thru Fri, utilizing the On-Call Finder functionality of Epic and searching for Perfusion, or by contacting the hospital  from 4pm to 630am and on weekends and asking to speak with the perfusionist on call.    11:48 AM

## 2022-06-24 LAB
ABO + RH BLD: NORMAL
ALBUMIN SERPL BCP-MCNC: 3.4 G/DL (ref 3.5–5.2)
ALLENS TEST: ABNORMAL
ALP SERPL-CCNC: 131 U/L (ref 55–135)
ALT SERPL W/O P-5'-P-CCNC: 19 U/L (ref 10–44)
ANION GAP SERPL CALC-SCNC: 12 MMOL/L (ref 8–16)
ANION GAP SERPL CALC-SCNC: 13 MMOL/L (ref 8–16)
APTT BLDCRRT: 40.2 SEC (ref 21–32)
AST SERPL-CCNC: 22 U/L (ref 10–40)
BASOPHILS # BLD AUTO: 0.06 K/UL (ref 0–0.2)
BASOPHILS NFR BLD: 0.6 % (ref 0–1.9)
BILIRUB SERPL-MCNC: 1 MG/DL (ref 0.1–1)
BLD GP AB SCN CELLS X3 SERPL QL: NORMAL
BLD PROD TYP BPU: NORMAL
BLOOD UNIT EXPIRATION DATE: NORMAL
BLOOD UNIT TYPE CODE: 600
BLOOD UNIT TYPE CODE: 600
BLOOD UNIT TYPE CODE: 6200
BLOOD UNIT TYPE: NORMAL
BUN SERPL-MCNC: 30 MG/DL (ref 6–20)
BUN SERPL-MCNC: 38 MG/DL (ref 6–20)
CALCIUM SERPL-MCNC: 10.2 MG/DL (ref 8.7–10.5)
CALCIUM SERPL-MCNC: 9.2 MG/DL (ref 8.7–10.5)
CHLORIDE SERPL-SCNC: 87 MMOL/L (ref 95–110)
CHLORIDE SERPL-SCNC: 89 MMOL/L (ref 95–110)
CO2 SERPL-SCNC: 30 MMOL/L (ref 23–29)
CO2 SERPL-SCNC: 30 MMOL/L (ref 23–29)
CODING SYSTEM: NORMAL
CREAT SERPL-MCNC: 1.6 MG/DL (ref 0.5–1.4)
CREAT SERPL-MCNC: 1.9 MG/DL (ref 0.5–1.4)
DELSYS: ABNORMAL
DIFFERENTIAL METHOD: ABNORMAL
DISPENSE STATUS: NORMAL
EOSINOPHIL # BLD AUTO: 0.1 K/UL (ref 0–0.5)
EOSINOPHIL NFR BLD: 1 % (ref 0–8)
ERYTHROCYTE [DISTWIDTH] IN BLOOD BY AUTOMATED COUNT: 14.5 % (ref 11.5–14.5)
EST. GFR  (AFRICAN AMERICAN): 50.9 ML/MIN/1.73 M^2
EST. GFR  (AFRICAN AMERICAN): >60 ML/MIN/1.73 M^2
EST. GFR  (NON AFRICAN AMERICAN): 44.1 ML/MIN/1.73 M^2
EST. GFR  (NON AFRICAN AMERICAN): 54.2 ML/MIN/1.73 M^2
FLOW: 4
GLUCOSE SERPL-MCNC: 241 MG/DL (ref 70–110)
GLUCOSE SERPL-MCNC: 349 MG/DL (ref 70–110)
HCO3 UR-SCNC: 32.1 MMOL/L (ref 24–28)
HCO3 UR-SCNC: 33.5 MMOL/L (ref 24–28)
HCO3 UR-SCNC: 33.5 MMOL/L (ref 24–28)
HCO3 UR-SCNC: 35.7 MMOL/L (ref 24–28)
HCT VFR BLD AUTO: 29.5 % (ref 40–54)
HGB BLD-MCNC: 9.6 G/DL (ref 14–18)
IMM GRANULOCYTES # BLD AUTO: 0.05 K/UL (ref 0–0.04)
IMM GRANULOCYTES NFR BLD AUTO: 0.5 % (ref 0–0.5)
LACTATE SERPL-SCNC: 1.3 MMOL/L (ref 0.5–2.2)
LDH SERPL L TO P-CCNC: 395 U/L (ref 110–260)
LYMPHOCYTES # BLD AUTO: 1.9 K/UL (ref 1–4.8)
LYMPHOCYTES NFR BLD: 18.6 % (ref 18–48)
MAGNESIUM SERPL-MCNC: 2.2 MG/DL (ref 1.6–2.6)
MCH RBC QN AUTO: 29.4 PG (ref 27–31)
MCHC RBC AUTO-ENTMCNC: 32.5 G/DL (ref 32–36)
MCV RBC AUTO: 90 FL (ref 82–98)
METHEMOGLOBIN: 1.1 % (ref 0–3)
MODE: ABNORMAL
MONOCYTES # BLD AUTO: 1 K/UL (ref 0.3–1)
MONOCYTES NFR BLD: 9.4 % (ref 4–15)
NEUTROPHILS # BLD AUTO: 7.2 K/UL (ref 1.8–7.7)
NEUTROPHILS NFR BLD: 69.9 % (ref 38–73)
NRBC BLD-RTO: 0 /100 WBC
NUM UNITS TRANS FFP: NORMAL
PCO2 BLDA: 53.9 MMHG (ref 35–45)
PCO2 BLDA: 55.8 MMHG (ref 35–45)
PCO2 BLDA: 58.4 MMHG (ref 35–45)
PCO2 BLDA: 59.6 MMHG (ref 35–45)
PH SMN: 7.36 [PH] (ref 7.35–7.45)
PH SMN: 7.37 [PH] (ref 7.35–7.45)
PH SMN: 7.39 [PH] (ref 7.35–7.45)
PH SMN: 7.4 [PH] (ref 7.35–7.45)
PLATELET # BLD AUTO: 411 K/UL (ref 150–450)
PMV BLD AUTO: 10.3 FL (ref 9.2–12.9)
PO2 BLDA: 25 MMHG (ref 40–60)
PO2 BLDA: 25 MMHG (ref 40–60)
PO2 BLDA: 26 MMHG (ref 40–60)
PO2 BLDA: 26 MMHG (ref 40–60)
POC BE: 11 MMOL/L
POC BE: 7 MMOL/L
POC BE: 8 MMOL/L
POC BE: 9 MMOL/L
POC SATURATED O2: 40 % (ref 95–100)
POC SATURATED O2: 43 % (ref 95–100)
POC SATURATED O2: 44 % (ref 95–100)
POC SATURATED O2: 46 % (ref 95–100)
POC TCO2: 34 MMOL/L (ref 24–29)
POC TCO2: 35 MMOL/L (ref 24–29)
POC TCO2: 35 MMOL/L (ref 24–29)
POC TCO2: 37 MMOL/L (ref 24–29)
POCT GLUCOSE: 230 MG/DL (ref 70–110)
POCT GLUCOSE: 341 MG/DL (ref 70–110)
POCT GLUCOSE: 345 MG/DL (ref 70–110)
POCT GLUCOSE: 397 MG/DL (ref 70–110)
POTASSIUM SERPL-SCNC: 3.7 MMOL/L (ref 3.5–5.1)
POTASSIUM SERPL-SCNC: 4.1 MMOL/L (ref 3.5–5.1)
PROT SERPL-MCNC: 7.4 G/DL (ref 6–8.4)
RBC # BLD AUTO: 3.27 M/UL (ref 4.6–6.2)
SAMPLE: ABNORMAL
SITE: ABNORMAL
SODIUM SERPL-SCNC: 128 MMOL/L (ref 136–145)
SODIUM SERPL-SCNC: 129 MMOL/L (ref 136–145)
SODIUM SERPL-SCNC: 130 MMOL/L (ref 136–145)
SODIUM SERPL-SCNC: 132 MMOL/L (ref 136–145)
TRANS ERYTHROCYTES VOL PATIENT: NORMAL ML
VANCOMYCIN TROUGH SERPL-MCNC: 14.4 UG/ML (ref 10–22)
WBC # BLD AUTO: 10.24 K/UL (ref 3.9–12.7)

## 2022-06-24 PROCEDURE — 99900035 HC TECH TIME PER 15 MIN (STAT): Mod: NTX

## 2022-06-24 PROCEDURE — 80053 COMPREHEN METABOLIC PANEL: CPT | Mod: NTX | Performed by: NURSE PRACTITIONER

## 2022-06-24 PROCEDURE — 83615 LACTATE (LD) (LDH) ENZYME: CPT | Mod: NTX | Performed by: PHYSICIAN ASSISTANT

## 2022-06-24 PROCEDURE — 25000003 PHARM REV CODE 250: Mod: NTX | Performed by: STUDENT IN AN ORGANIZED HEALTH CARE EDUCATION/TRAINING PROGRAM

## 2022-06-24 PROCEDURE — 25000003 PHARM REV CODE 250: Mod: NTX | Performed by: INTERNAL MEDICINE

## 2022-06-24 PROCEDURE — 20000000 HC ICU ROOM: Mod: NTX

## 2022-06-24 PROCEDURE — 99233 SBSQ HOSP IP/OBS HIGH 50: CPT | Mod: NTX,,, | Performed by: INTERNAL MEDICINE

## 2022-06-24 PROCEDURE — 83735 ASSAY OF MAGNESIUM: CPT | Mod: NTX | Performed by: INTERNAL MEDICINE

## 2022-06-24 PROCEDURE — 25000003 PHARM REV CODE 250: Mod: NTX | Performed by: PHYSICIAN ASSISTANT

## 2022-06-24 PROCEDURE — 99291 PR CRITICAL CARE, E/M 30-74 MINUTES: ICD-10-PCS | Mod: NTX,,, | Performed by: PHYSICIAN ASSISTANT

## 2022-06-24 PROCEDURE — 85730 THROMBOPLASTIN TIME PARTIAL: CPT | Mod: NTX | Performed by: INTERNAL MEDICINE

## 2022-06-24 PROCEDURE — 86901 BLOOD TYPING SEROLOGIC RH(D): CPT | Mod: NTX | Performed by: PHYSICIAN ASSISTANT

## 2022-06-24 PROCEDURE — 84295 ASSAY OF SERUM SODIUM: CPT | Mod: 91,NTX | Performed by: PHYSICIAN ASSISTANT

## 2022-06-24 PROCEDURE — 99232 SBSQ HOSP IP/OBS MODERATE 35: CPT | Mod: NTX,,, | Performed by: NURSE PRACTITIONER

## 2022-06-24 PROCEDURE — 82803 BLOOD GASES ANY COMBINATION: CPT | Mod: NTX

## 2022-06-24 PROCEDURE — 99291 CRITICAL CARE FIRST HOUR: CPT | Mod: NTX,,, | Performed by: PHYSICIAN ASSISTANT

## 2022-06-24 PROCEDURE — 63600175 PHARM REV CODE 636 W HCPCS: Mod: NTX | Performed by: NURSE PRACTITIONER

## 2022-06-24 PROCEDURE — 27000221 HC OXYGEN, UP TO 24 HOURS: Mod: NTX

## 2022-06-24 PROCEDURE — 94761 N-INVAS EAR/PLS OXIMETRY MLT: CPT | Mod: NTX

## 2022-06-24 PROCEDURE — 63600175 PHARM REV CODE 636 W HCPCS: Mod: NTX | Performed by: INTERNAL MEDICINE

## 2022-06-24 PROCEDURE — 80048 BASIC METABOLIC PNL TOTAL CA: CPT | Mod: NTX,XB | Performed by: INTERNAL MEDICINE

## 2022-06-24 PROCEDURE — 83050 HGB METHEMOGLOBIN QUAN: CPT | Mod: NTX

## 2022-06-24 PROCEDURE — 99233 PR SUBSEQUENT HOSPITAL CARE,LEVL III: ICD-10-PCS | Mod: NTX,,, | Performed by: INTERNAL MEDICINE

## 2022-06-24 PROCEDURE — 25000003 PHARM REV CODE 250: Mod: NTX | Performed by: NURSE PRACTITIONER

## 2022-06-24 PROCEDURE — 85025 COMPLETE CBC W/AUTO DIFF WBC: CPT | Mod: NTX | Performed by: INTERNAL MEDICINE

## 2022-06-24 PROCEDURE — 63600367 HC NITRIC OXIDE PER HOUR: Mod: NTX

## 2022-06-24 PROCEDURE — 80202 ASSAY OF VANCOMYCIN: CPT | Mod: NTX | Performed by: INTERNAL MEDICINE

## 2022-06-24 PROCEDURE — 97110 THERAPEUTIC EXERCISES: CPT | Mod: NTX

## 2022-06-24 PROCEDURE — 63600175 PHARM REV CODE 636 W HCPCS: Mod: NTX | Performed by: STUDENT IN AN ORGANIZED HEALTH CARE EDUCATION/TRAINING PROGRAM

## 2022-06-24 PROCEDURE — 99232 PR SUBSEQUENT HOSPITAL CARE,LEVL II: ICD-10-PCS | Mod: NTX,,, | Performed by: NURSE PRACTITIONER

## 2022-06-24 PROCEDURE — 27000202 HC IABP, ADD'L DAY: Mod: NTX

## 2022-06-24 PROCEDURE — C9399 UNCLASSIFIED DRUGS OR BIOLOG: HCPCS | Mod: NTX | Performed by: NURSE PRACTITIONER

## 2022-06-24 PROCEDURE — 99292 PR CRITICAL CARE, ADDL 30 MIN: ICD-10-PCS | Mod: NTX,,, | Performed by: INTERNAL MEDICINE

## 2022-06-24 PROCEDURE — 83605 ASSAY OF LACTIC ACID: CPT | Mod: NTX | Performed by: PHYSICIAN ASSISTANT

## 2022-06-24 PROCEDURE — 99292 CRITICAL CARE ADDL 30 MIN: CPT | Mod: NTX,,, | Performed by: INTERNAL MEDICINE

## 2022-06-24 RX ORDER — VANCOMYCIN HCL IN 5 % DEXTROSE 1G/250ML
1000 PLASTIC BAG, INJECTION (ML) INTRAVENOUS
Status: DISCONTINUED | OUTPATIENT
Start: 2022-06-24 | End: 2022-06-25

## 2022-06-24 RX ORDER — GLUCAGON 1 MG
1 KIT INJECTION
Status: DISCONTINUED | OUTPATIENT
Start: 2022-06-24 | End: 2022-06-25

## 2022-06-24 RX ORDER — IBUPROFEN 200 MG
16 TABLET ORAL
Status: DISCONTINUED | OUTPATIENT
Start: 2022-06-24 | End: 2022-06-25

## 2022-06-24 RX ORDER — INSULIN ASPART 100 [IU]/ML
8 INJECTION, SOLUTION INTRAVENOUS; SUBCUTANEOUS
Status: DISCONTINUED | OUTPATIENT
Start: 2022-06-24 | End: 2022-06-25

## 2022-06-24 RX ORDER — IBUPROFEN 200 MG
24 TABLET ORAL
Status: DISCONTINUED | OUTPATIENT
Start: 2022-06-24 | End: 2022-06-25

## 2022-06-24 RX ORDER — INSULIN ASPART 100 [IU]/ML
1-10 INJECTION, SOLUTION INTRAVENOUS; SUBCUTANEOUS
Status: DISCONTINUED | OUTPATIENT
Start: 2022-06-24 | End: 2022-06-25

## 2022-06-24 RX ORDER — TOLVAPTAN 15 MG/1
15 TABLET ORAL ONCE
Status: COMPLETED | OUTPATIENT
Start: 2022-06-24 | End: 2022-06-24

## 2022-06-24 RX ADMIN — INSULIN DETEMIR 20 UNITS: 100 INJECTION, SOLUTION SUBCUTANEOUS at 07:06

## 2022-06-24 RX ADMIN — TOLVAPTAN 15 MG: 15 TABLET ORAL at 11:06

## 2022-06-24 RX ADMIN — METHOCARBAMOL TABLETS 500 MG: 500 TABLET, COATED ORAL at 04:06

## 2022-06-24 RX ADMIN — POTASSIUM CHLORIDE 30 MEQ: 10 CAPSULE, COATED, EXTENDED RELEASE ORAL at 09:06

## 2022-06-24 RX ADMIN — ACETAMINOPHEN 650 MG: 325 TABLET ORAL at 03:06

## 2022-06-24 RX ADMIN — HYDROCODONE BITARTRATE AND ACETAMINOPHEN 1 TABLET: 5; 325 TABLET ORAL at 03:06

## 2022-06-24 RX ADMIN — BUSPIRONE HYDROCHLORIDE 7.5 MG: 5 TABLET ORAL at 09:06

## 2022-06-24 RX ADMIN — INSULIN ASPART 8 UNITS: 100 INJECTION, SOLUTION INTRAVENOUS; SUBCUTANEOUS at 07:06

## 2022-06-24 RX ADMIN — METHOCARBAMOL TABLETS 500 MG: 500 TABLET, COATED ORAL at 12:06

## 2022-06-24 RX ADMIN — POLYETHYLENE GLYCOL 3350 17 G: 17 POWDER, FOR SOLUTION ORAL at 09:06

## 2022-06-24 RX ADMIN — ASPIRIN 81 MG CHEWABLE TABLET 81 MG: 81 TABLET CHEWABLE at 09:06

## 2022-06-24 RX ADMIN — MIRTAZAPINE 15 MG: 15 TABLET, FILM COATED ORAL at 09:06

## 2022-06-24 RX ADMIN — GABAPENTIN 300 MG: 300 CAPSULE ORAL at 09:06

## 2022-06-24 RX ADMIN — FUROSEMIDE 30 MG/HR: 10 INJECTION, SOLUTION INTRAMUSCULAR; INTRAVENOUS at 07:06

## 2022-06-24 RX ADMIN — METHOCARBAMOL TABLETS 500 MG: 500 TABLET, COATED ORAL at 09:06

## 2022-06-24 RX ADMIN — Medication 400 MG: at 09:06

## 2022-06-24 RX ADMIN — SUCRALFATE 1 G: 1 TABLET ORAL at 09:06

## 2022-06-24 RX ADMIN — VANCOMYCIN HYDROCHLORIDE 1000 MG: 1 INJECTION, POWDER, LYOPHILIZED, FOR SOLUTION INTRAVENOUS at 03:06

## 2022-06-24 RX ADMIN — HEPARIN SODIUM 24 UNITS/KG/HR: 5000 INJECTION INTRAVENOUS; SUBCUTANEOUS at 11:06

## 2022-06-24 RX ADMIN — EPINEPHRINE 0.04 MCG/KG/MIN: 1 INJECTION INTRAMUSCULAR; INTRAVENOUS; SUBCUTANEOUS at 05:06

## 2022-06-24 RX ADMIN — HYDROCODONE BITARTRATE AND ACETAMINOPHEN 1 TABLET: 5; 325 TABLET ORAL at 05:06

## 2022-06-24 RX ADMIN — GABAPENTIN 300 MG: 300 CAPSULE ORAL at 03:06

## 2022-06-24 RX ADMIN — INSULIN ASPART 8 UNITS: 100 INJECTION, SOLUTION INTRAVENOUS; SUBCUTANEOUS at 11:06

## 2022-06-24 RX ADMIN — VANCOMYCIN HYDROCHLORIDE 1000 MG: 1 INJECTION, POWDER, LYOPHILIZED, FOR SOLUTION INTRAVENOUS at 02:06

## 2022-06-24 RX ADMIN — PANTOPRAZOLE SODIUM 40 MG: 40 TABLET, DELAYED RELEASE ORAL at 09:06

## 2022-06-24 RX ADMIN — MILRINONE LACTATE IN DEXTROSE 0.38 MCG/KG/MIN: 200 INJECTION, SOLUTION INTRAVENOUS at 05:06

## 2022-06-24 RX ADMIN — INSULIN ASPART 8 UNITS: 100 INJECTION, SOLUTION INTRAVENOUS; SUBCUTANEOUS at 04:06

## 2022-06-24 RX ADMIN — HEPARIN SODIUM 24 UNITS/KG/HR: 5000 INJECTION INTRAVENOUS; SUBCUTANEOUS at 10:06

## 2022-06-24 RX ADMIN — ACETAMINOPHEN 650 MG: 325 TABLET ORAL at 05:06

## 2022-06-24 RX ADMIN — INSULIN ASPART 2 UNITS: 100 INJECTION, SOLUTION INTRAVENOUS; SUBCUTANEOUS at 10:06

## 2022-06-24 RX ADMIN — SENNOSIDES AND DOCUSATE SODIUM 1 TABLET: 50; 8.6 TABLET ORAL at 09:06

## 2022-06-24 RX ADMIN — INSULIN ASPART 10 UNITS: 100 INJECTION, SOLUTION INTRAVENOUS; SUBCUTANEOUS at 11:06

## 2022-06-24 RX ADMIN — FUROSEMIDE 30 MG/HR: 10 INJECTION, SOLUTION INTRAMUSCULAR; INTRAVENOUS at 10:06

## 2022-06-24 NOTE — PROGRESS NOTES
Specimen Collected: 06/23/22 01:08 Last Resulted: 06/24/22 14:37     Gram stain dudley bottle: Gram positive cocci in clusters resembling Staph      MD on call notified, patient already on Vanc

## 2022-06-24 NOTE — ASSESSMENT & PLAN NOTE
37-year-old male with history of NIDCM with BiV systolic heart failure on home milrinone presented 6/13/2022 for ADHF, IABP placed 6/13/2022, now with Staph epi bacteremia, vancomycin initiated 6/22/2022. IABP and IJ line exchanged on 6/23/2022. Repeat blood cultures from 6/23/2022 positive - but this was prior to IABL and LIJ exchange.    Recommendations:  - Continue vancomycin IV, goal 15-20  - Follow-up staph epi susceptibilities  - Repeat blood cultures 6/24/2022  - If blood cultures remain positive persistently, consider GUILLERMO to evaluate for ICD endocarditis  - Hold off on LVAD implantation at this time

## 2022-06-24 NOTE — PROGRESS NOTES
[facility] - Cardiac Intensive Care  Heart Transplant  Progress Note    Patient Name: Kevan Queen  MRN: [id number]  Admission Date: 6/13/2022  Hospital Length of Stay: 11 days  Attending Physician: Luca Lopez Jr.,*  Primary Care Provider: ORALIA Cline  Principal Problem:Acute on chronic combined systolic and diastolic heart failure, NYHA class 4    Subjective:     Interval History: No complaints. IABP and central line exchanged yesterday. Blood cultures 6/21 +ve Staph epi, repeat blood cutlures from yesterday NGTD. ID recommended continuing IV Vanc, will plan for GUILLERMO if cultures remain positive. CVP 15, SVO2 40%, CO 4.9, CI 2.2, SVR 1322 with IABP 1:1, epi 0.04, milrinone 0.375, Marcella 10 ppm, and IV Lasix gtt at 30 mg/hr. Na 129 today, will give 15 mg tolvaptan.       Lines:  IABP 6/23  Right IJ 6/23    Continuous Infusions:   sodium chloride 0.9% Stopped (06/23/22 0358)    EPINEPHrine 0.04 mcg/kg/min (06/24/22 1101)    furosemide (LASIX) 10 mg/mL infusion (non-titrating) 30 mg/hr (06/24/22 1101)    heparin (porcine) in D5W 24 Units/kg/hr (06/24/22 1138)    milrinone 20mg/100ml D5W (200mcg/ml) 0.375 mcg/kg/min (06/24/22 1101)    nitric oxide gas       Scheduled Meds:   acetaminophen  650 mg Oral Q8H    aspirin  81 mg Oral Daily    busPIRone  7.5 mg Oral Q12H    gabapentin  300 mg Oral TID    HYDROcodone-acetaminophen  1 tablet Oral Q8H    insulin aspart U-100  8 Units Subcutaneous TIDWM    [START ON 6/25/2022] insulin detemir U-100  24 Units Subcutaneous Daily    LIDOcaine HCL 20 mg/ml (2%)  20 mL Other Once    methocarbamoL  500 mg Oral QID    mirtazapine  15 mg Oral Nightly    pantoprazole  40 mg Oral Daily    polyethylene glycol  17 g Oral BID    potassium chloride  30 mEq Oral BID    senna-docusate 8.6-50 mg  1 tablet Oral Daily    sucralfate  1 g Oral Nightly    tolvaptan  15 mg Oral Once    vancomycin (VANCOCIN) IVPB  1,000 mg Intravenous Q12H     PRN Meds:sodium chloride,  sodium chloride, sodium chloride, ALPRAZolam, bisacodyL, dextrose 10%, dextrose 10%, diphenhydrAMINE, glucagon (human recombinant), glucose, glucose, insulin aspart U-100, magnesium oxide, magnesium oxide, mupirocin, potassium bicarbonate, potassium bicarbonate, potassium bicarbonate, potassium, sodium phosphates, potassium, sodium phosphates, potassium, sodium phosphates, promethazine, sodium chloride 0.9%, sodium chloride 0.9%, Pharmacy to dose Vancomycin consult **AND** vancomycin - pharmacy to dose    Review of patient's allergies indicates:   Allergen Reactions    Aspirin Other (See Comments)     Mr. Thacker is enrolled in Dr. Paige's Alon Trial and cannot have any aspirin and/or aspirin-containing products. DO NOT cancel any orders for INV Aspirin 100 mg/Placebo. If you have questions, please contact Isabel @ [phone], [phone],[email or web address], secure chat or MS Teams message.    Bumex [bumetanide] Hives    Lactose Diarrhea     Other reaction(s): Abdominal distension, gaseous    Torsemide Hives     Objective:     Vital Signs (Most Recent):  Temp: 98.3 °F (36.8 °C) (06/24/22 0701)  Pulse: 104 (06/24/22 1100)  Resp: 18 (06/24/22 1100)  BP: 112/64 (06/24/22 1100)  SpO2: 100 % (06/24/22 1100)   Vital Signs (24h Range):  Temp:  [98.3 °F (36.8 °C)-98.7 °F (37.1 °C)] 98.3 °F (36.8 °C)  Pulse:  [102-118] 104  Resp:  [13-23] 18  SpO2:  [93 %-100 %] 100 %  BP: ()/(59-90) 112/64     Patient Vitals for the past 72 hrs (Last 3 readings):   Weight   06/22/22 0300 96.2 kg (212 lb 1.3 oz)       Body mass index is 26.51 kg/m².      Intake/Output Summary (Last 24 hours) at 6/24/2022 1141  Last data filed at 6/24/2022 1101  Gross per 24 hour   Intake 2451.07 ml   Output 5785 ml   Net -3333.93 ml         Hemodynamic Parameters:       Telemetry: ST    Physical Exam  Constitutional:       Appearance: Normal appearance.   HENT:      Head: Normocephalic and atraumatic.   Eyes:      Conjunctiva/sclera:  Conjunctivae normal.      Pupils: Pupils are equal, round, and reactive to light.   Neck:      Comments: RIJ TLC  Cardiovascular:      Rate and Rhythm: Regular rhythm. Tachycardia present.      Comments: +S3  Pulmonary:      Effort: Pulmonary effort is normal.      Breath sounds: Normal breath sounds.   Abdominal:      General: Bowel sounds are normal. There is distension.   Musculoskeletal:         General: No swelling. Normal range of motion.      Cervical back: Neck supple.   Skin:     General: Skin is warm and dry.      Capillary Refill: Capillary refill takes 2 to 3 seconds.   Neurological:      General: No focal deficit present.      Mental Status: He is alert and oriented to person, place, and time.   Psychiatric:         Mood and Affect: Mood normal.         Behavior: Behavior normal.         Thought Content: Thought content normal.         Judgment: Judgment normal.       Significant Labs:  CBC:  Recent Labs   Lab 06/22/22  1347 06/23/22  0246 06/24/22  0330   WBC 12.11 11.31 10.24   RBC 3.42* 3.37* 3.27*   HGB 10.0* 9.9* 9.6*   HCT 29.6* 29.7* 29.5*    383 411   MCV 87 88 90   MCH 29.2 29.4 29.4   MCHC 33.8 33.3 32.5       BNP:  No results for input(s): BNP in the last 168 hours.    Invalid input(s): BNPTRIAGELBLO    CMP:  Recent Labs   Lab 06/22/22  0344 06/22/22  1347 06/23/22  0246 06/23/22  1519 06/24/22  0330   *   < > 352* 347* 349*   CALCIUM 9.8   < > 9.5 9.8 9.2   ALBUMIN 3.4*  --  3.4*  --  3.4*   PROT 8.0  --  7.9  --  7.4   *   < > 129* 129* 129*   K 3.6   < > 3.8 3.8 4.1   CO2 30*   < > 31* 28 30*   CL 89*   < > 87* 88* 87*   BUN 32*   < > 39* 36* 38*   CREATININE 1.8*   < > 1.7* 1.9* 1.9*   ALKPHOS 113  --  117  --  131   ALT 18  --  21  --  19   AST 24  --  23  --  22   BILITOT 1.3*  --  1.0  --  1.0    < > = values in this interval not displayed.        Coagulation:   Recent Labs   Lab 06/20/22  1607 06/21/22  0418 06/22/22  1347 06/23/22  0246 06/24/22  0330   INR 1.0   --   --   --   --    APTT  --    < > 49.5* 49.0* 40.2*    < > = values in this interval not displayed.       LDH:  Recent Labs   Lab 06/23/22  0246   *       Microbiology:  Microbiology Results (last 7 days)       Procedure Component Value Units Date/Time    Blood culture [[id number]]  (Abnormal) Collected: 06/21/22 2250    Order Status: Completed Specimen: Blood from Peripheral, Wrist, Left Updated: 06/24/22 0911     Blood Culture, Routine Gram stain dudley bottle: Gram positive cocci in clusters resembling Staph      Results called to and read back by: Rosa Pardo RN. 06/22/2022  23:29      Gram stain aer bottle: Gram positive cocci in clusters resembling Staph       Positive results previously called 06/23/2022  04:33      STAPHYLOCOCCUS SPECIES  Identification and susceptibility pending      Blood culture [[id number]]  (Abnormal) Collected: 06/21/22 2250    Order Status: Completed Specimen: Blood from Peripheral, Wrist, Left Updated: 06/24/22 0751     Blood Culture, Routine Gram stain dudley bottle: Gram positive cocci      Results called to and read back by: Arnulfo Herrera RN. 06/22/2022  17:46      Gram stain aer bottle: Gram positive cocci in clusters resembling Staph       Positive results previously called 06/23/2022  00:11      STAPHYLOCOCCUS EPIDERMIDIS  Susceptibility pending      Blood culture [[id number]]  (Abnormal) Collected: 06/20/22 1651    Order Status: Completed Specimen: Blood from Peripheral, Hand, Left Updated: 06/24/22 0718     Blood Culture, Routine Gram stain dudley bottle: Gram positive cocci in clusters resembling Staph      Results called to and read back by:Tara López RN 06/21/2022  22:16      STAPHYLOCOCCUS EPIDERMIDIS  Susceptibility pending      Blood culture [[id number]] Collected: 06/23/22 0105    Order Status: Completed Specimen: Blood from Peripheral, Antecubital, Right Updated: 06/24/22 0613     Blood Culture, Routine No Growth to date      No Growth to date    Blood culture  "[[id number]] Collected: 06/23/22 0108    Order Status: Completed Specimen: Blood from Peripheral, Hand, Right Updated: 06/24/22 0613     Blood Culture, Routine No Growth to date      No Growth to date    Blood culture [[id number]]     Order Status: Canceled Specimen: Blood             I have reviewed all pertinent labs within the past 24 hours.    Estimated Creatinine Clearance: 63.6 mL/min (A) (based on SCr of 1.9 mg/dL (H)).    Diagnostic Results:  I have reviewed and interpreted all pertinent imaging results/findings within the past 24 hours.    Assessment and Plan:     36 yo male with NIDCM with BiV systolic heart failure, on home Milrinone at 0.375 mcg/kg/min, presented at [facility] 4/2/22 ,was not evaluated for OHT as he has recently quit smoking in April 2022 but was approved for VAD with plan to begin OHT evaluation in upcoming months if Mr Queen is stable and suitable for OHT eval (blood group A), 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 Went to ED at [facility] 6/10 but left after waiting 4 hours. Had clinic appointment with us today, but arrived to [facility] early this morning and decided to go to the ED instead. Baseline Lasix dose is 80 mg bid. Reports taking 240 mg qd past 3 days with no improvement. BNP is 1701, up from 898 on 6/2 and 49 on 5/24. sCr is 1.8 with baseline ~ 1.5-1.7. sPO2 on RA is 93%. Wife at bedside    He has been given Lasix 80 mg IVP in the ED with plan to start Lasix gtt at 20 mg/hr           * Acute on chronic combined systolic and diastolic heart failure, NYHA class 4  - NIDCM  - Approved for LVAD at [facility] on 4/2/22. Was not evaluated for OHTx as he quit " smoking in April 2022  - Moved to ICU 6/13 in the setting of significant volume overloaded and low output state. IABP placed 6/13 and Epi and Marcella added for RV support.   - IABP exchanged 6/23  - Lasix gtt D/C'd 6/17 secondary to increase in sCr. Given IV NS and sCr improved  - Lasix drip restarted 6/23 at 40 mg/hr and now at 30mg/hr. Na 129, will give 15 mg tolvaptan   - IABP 1:1, IV milrinone 0.375, epi 0.04, and Marcella 10 ppm  - CVP: 15, SVO 40, CO: 4.9, CI: 2.2, SVR: 1322  - TTE 6/21: LVEF 15%, LVEDD: 7.14, TAPSE: 1.29 severe MR, mod TR. Mild RVE with mod reduced RV failure   - RHC done 4/19/22 on Milrinone 0.5 mcg/kg/min: RA 11, PA 50/27 (35), W 27, CO/CI 3.7/1.7, PVR 2.2 and SVR 1535   - GDMT: Entresto and Aldactone on hold since admit 5/24-5/27 2/2 elevated sCr  - Reports dry weight is 217#, and that he had gained 6# on his home scale PTA  - Tentative VAD implant 6/29. Unlikely OHTx candidate with smoking history (quit April 2022)     Staphylococcus epidermidis bacteremia  -Blood cultures 6/20 and repeat 6/21 positive for Staph Epi. Most recent blood cultures 6/23 NGTD   -IJ exchanged on 6/23, IABP exchanged 6/23  -Started on Empiric Vanc 6/22  -ID consulted given upcoming planned LVAD, continue Vanc, recommend GUILLERMO if cultures remain positive.     Muscle cramping  -decreased scheduled Percocet and starting Gabapentin    Uncontrolled diabetes mellitus  -Admitted 5/24-5/27 with hyperglycemia hyperosmolar syndrome  -Hgb A1C 9.2 on 5/20/22  -Glucose per labs today 265  -Endocrine consulted for better control    CKD (chronic kidney disease) stage 3, GFR 30-59 ml/min  - Admit sCr 1.8, baseline ~ 1.5-1.7  - Creatinine elevated to 2.0 but improved after fluid.    - Creatinine has been stable, but increased to 1.9 today   - Will monitor response    Cardiogenic shock  -See A/C CHF    Uninterrupted Critical Care/Counseling Time (not including procedures): 65 minutes      Denise Espinoza PA-C  Heart Transplant  [facility] -  Cardiac Intensive Care

## 2022-06-24 NOTE — PROGRESS NOTES
Visited patient at the bedside. Patient is in good spirits. No questions at this time. Patient is doing well. Will continue to round on patient.

## 2022-06-24 NOTE — PLAN OF CARE
Problem: Physical Therapy  Goal: Physical Therapy Goal  Description:     Problem: Physical Therapy  Goal: Physical Therapy Goal  Description: Goals to be met by: 2022     Patient will increase functional independence with mobility by performin. Lower extremity exercise program without IABP 30 reps per handout, with independence  2.Upper extremity exercise program 30 reps per handout, with independence        Outcome: Met   Pt was able to move BUE and RLE for exercise program. 2022     Date of Service: 2020    The patient wanted to do like an evaluation of her medical problems and health maintenance issues  over the phone, and discuss osteoporosis.  She said that she hard last year.  She got diagnosed with breast carcinoma, follows with Dr. Callahan.  She underwent bilateral mastectomy and sentinel lymph node biopsy, that was in 2019.  Patient has been following on a regular basis with Dr. Callahan.  Her pathology showed multifocal poorly differentiated, ER negative, FL negative and HER-2 negative carcinoma of the left breast, stage cT1c cN0 cM0, stage IB with MRI suggesting cT3.   Lymph node biopsy was negative and tumor measurement was between 5.4x2.6x4.4 cm.  The breast surgeon was Dr. Wells.  The patient underwent neoadjuvant chemotherapy.  Plan 4 cycles of Carboplatin/Taxol, followed by 4 cycles of Cyclophosphamide and Doxorubicin recommended.  Now at present she continues chemo.  She has been now on Xeloda from Dr. Eboni Callahan, her oncologist.  She says that she tolerates the medication well.  She had a little bit of bone pain, but not at present.  For her anxiety, she has been on Lexapro 10 mg daily, seems to tolerate that well.  Also, last year she lost her  to a sudden heart attack, who  at the end of the year, and also later on she lost her father.  That was a lot of things that happened.  Her primary support is her mother.  She has been still working.  She works full-time from home.  Her anxiety is under control.  She tries to be active as much as possible and, like I said, supportive family.  The patient had bone density done last year and that was in 2019, that showed signs of osteoporosis with a T-score -2.7 in her L-spine and T-score in her left femur of -1.7.  Her vitamin D level was checked last year and was in the normal range of 49 and that was in March last year.  She has been taking 2000 units of Vitamin D and she is taking Calcium supplement 600 mg daily  OTC.  Tolerates them well.    REVIEW OF SYSTEMS:  As above.  CONSTITUTIONAL:  No weight changes.  No fever.  No weakness.  No fatigue.  No changes in appetite.  No night sweats.  HEENT:  No head trauma.  No pain.  No changes in vision.  No eye discharge, redness, or pain.  No nasal discharge.  No epistaxis.  No congestion.  No soreness in the mouth cavity.  No tongue soreness.  No dry mouth.  No sore throat.  No problems to speak or swallow.  CARDIOVASCULAR:  No chest pains.  No palpitations.  No shortness of breath.  No PND.  No orthopnea.  No peripheral edema.  No dizziness.  No fainting.  No claudication.  RESPIRATORY:  No shortness of breath.  No cough.  No phlegm production.  No hemoptysis.  No wheezing.  GASTROINTESTINAL:  No nausea, vomiting, diarrhea or constipation.  No hematochezia or melena.  No hematemesis.  No dysphagia.  GENITOURINARY:  No urgency or frequency on urination.  No pyuria.  No hematuria.  No incontinence.  MUSCULOSKELETAL:  No joint pains.  No swelling.  No stiffness in the joints.  No traumas to the joints.  No falls.  SKIN:  Normal integument.  No rashes.  No lesions.  No excessive skin dryness.  No color changes.  No new or changed moles.  No excessive hair loss.  No excessive hair growth.  No changes of the nails.  NEUROLOGIC:  No weakness or numbness of the extremities or any side of the body.  No changes in coordination.  No headaches.  No vertigo.  No seizures.  No memory loss.  No falls.  No problems with balance.  No muscular atrophy.  PSYCHIATRIC:  No mood changes.  No depression or anxiety.  No emotional lability.  No suicidal ideation.  No hallucinations.  No problems with insomnia.  ENDOCRINE:  No heat or cold intolerance.  No excessive thirst.  No excessive urination.  No changes in appetite.  No changes in skin color or hair growth.  HEMATOPOIETIC AND LYMPHATIC:  No easy bruising.  No abnormal bleeding.  No excessive fatigue.  No dyspnea.  No tender nodules on palpation or  any noticeable nodules.  No focal swelling noted.  ACCIDENT HISTORY:  There are no falls.  No trauma.  No violence.  No abuse.    ASSESSMENT AND PLAN:  1.  For her osteoporosis, we discussed different medication with bisphosphonate p.o. that she declined.  She would rather see endocrinologist for possible Reclast infusion, so order was placed.  Vitamin D is going to be rechecked again.  For now, continue with Vitamin D 2000 units daily and Calcium daily.  She drinks milk, she eats cheese, and she overall is trying to be active.  2.  For her anxiety she says that she is doing well on Lexapro 10 mg daily, continue.  3.  For her history of breast carcinoma, status post bilateral mastectomy  after chemoradiation and now on Zoloft, doing well.    See me back, usually in a year.  She was advised about all the vaccinations.  She is going to need Pneumovax 23 in the fall, flu shot this fall, then she can update with DTaP.   For shingles, we are going to hold on that for now.    Advised about wearing a mask when she goes outside with the COVID-19 situation and still do the social distancing that she has been doing.    When the weather gets better, advised about going for walks outside.    She was advised for anxiety, continue same medication, support from her mother, doing well.      Otherwise, she is up-to-date with her colonoscopy.  Mammogram does not need anymore because of bilateral mastectomy.  The patient is going to see me back in a year.    Total time of the visit was 23 minutes.      Dictated By: Lashanda Enriquez MD  Signing Provider: MD MARGARITO Acuna/parker ([id number])  DD: 04/24/2020 10:41:37 TD: 04/27/2020 06:29:50    Copy Sent To:

## 2022-06-24 NOTE — PT/OT/SLP PROGRESS
Physical Therapy Treatment/Discharge    Patient Name:  Kevan Queen   MRN:  [id number]    Recommendations:     Discharge Recommendations:   (home no needs)   Discharge Equipment Recommendations: none   Barriers to discharge: None    Assessment:     Kevan Queen is a 37 y.o. male admitted with a medical diagnosis of Acute on chronic combined systolic and diastolic heart failure, NYHA class 4.  He presents with the following impairments/functional limitations:   (pt has limited functional mobility due to femoral balloon pump in place.) pt tolerated treatment well and is Independent with there exer program. Pt will be discharged from PT and will resume treatment when pt able to advance mobility.     Rehab Prognosis: Good; patient would benefit from acute skilled PT services to address these deficits and reach maximum level of function.    Recent Surgery: Procedure(s) (LRB):  INSERTION-LEFT VENTRICULAR ASSIST DEVICE (Left) 1 Day Post-Op    Plan:     During this hospitalization, patient to be seen  (pt is being discharged from PT) to address the identified rehab impairments via  (PT will resume when pt is able to advance mobility.) and progress toward the following goals:    · Plan of Care Expires:  06/24/22    Subjective     Chief Complaint: pt c/o L shoulder pain during treatment.   Patient/Family Comments/goals: to get better  Pain/Comfort:  · Pain Rating 1: 10/10 (L shoulder)  · Pain Addressed 1: Cessation of Activity  · Pain Rating Post-Intervention 1: 10/10 (L shoulder)      Objective:     Communicated with nurse prior to session.  Patient found supine with telemetry, pulse ox (continuous), blood pressure cuff, central line (L femoral balloon pump, nitric oxide) upon PT entry to room.     General Precautions: Standard, fall (L femoral balloon pump)   Orthopedic Precautions:N/A   Braces:    Respiratory Status: nitric oxide     Functional Mobility:  · Not performed due to pt being on bed rest with Femoral balloon  pump.      AM-PAC 6 CLICK MOBILITY  Turning over in bed (including adjusting bedclothes, sheets and blankets)?: 1  Sitting down on and standing up from a chair with arms (e.g., wheelchair, bedside commode, etc.): 1  Moving from lying on back to sitting on the side of the bed?: 1  Moving to and from a bed to a chair (including a wheelchair)?: 1  Need to walk in hospital room?: 1  Climbing 3-5 steps with a railing?: 1  Basic Mobility Total Score: 6       Therapeutic Activities and Exercises:   pt is Independent with there exer program. Pt received verbal instructions in PT POC and verbally expressed understanding of such.     Patient left supine with all lines intact, call button in reach, RN notified and wife present..    GOALS:   Multidisciplinary Problems     Physical Therapy Goals     Not on file          Multidisciplinary Problems (Resolved)        Problem: Physical Therapy    Goal Priority Disciplines Outcome Goal Variances Interventions   Physical Therapy Goal   (Resolved)     PT, PT/OT Met     Description:     Problem: Physical Therapy  Goal: Physical Therapy Goal  Description: Goals to be met by: 2022     Patient will increase functional independence with mobility by performin. Lower extremity exercise program without IABP 30 reps per handout, with independence  2.Upper extremity exercise program 30 reps per handout, with independence                         Time Tracking:     PT Received On: 22  PT Start Time: 1317     PT Stop Time: 1325  PT Total Time (min): 8 min     Billable Minutes: Therapeutic Exercise 8 min    Treatment Type: Treatment (Discharge)  PT/PTA: PT     PTA Visit Number: 0     2022

## 2022-06-24 NOTE — SUBJECTIVE & OBJECTIVE
Interval History: No complaints. IABP and central line exchanged yesterday. Blood cultures 6/21 +ve Staph epi, repeat blood cutlures from yesterday NGTD. ID recommended continuing IV Vanc, will plan for GUILLERMO if cultures remain positive. CVP 15, SVO2 40%, CO 4.9, CI 2.2, SVR 1322 with IABP 1:1, epi 0.04, milrinone 0.375, Marcella 10 ppm, and IV Lasix gtt at 30 mg/hr. Na 129 today, will give 15 mg tolvaptan.       Lines:  IABP 6/23  Right IJ 6/23    Continuous Infusions:   sodium chloride 0.9% Stopped (06/23/22 0358)    EPINEPHrine 0.04 mcg/kg/min (06/24/22 1101)    furosemide (LASIX) 10 mg/mL infusion (non-titrating) 30 mg/hr (06/24/22 1101)    heparin (porcine) in D5W 24 Units/kg/hr (06/24/22 1138)    milrinone 20mg/100ml D5W (200mcg/ml) 0.375 mcg/kg/min (06/24/22 1101)    nitric oxide gas       Scheduled Meds:   acetaminophen  650 mg Oral Q8H    aspirin  81 mg Oral Daily    busPIRone  7.5 mg Oral Q12H    gabapentin  300 mg Oral TID    HYDROcodone-acetaminophen  1 tablet Oral Q8H    insulin aspart U-100  8 Units Subcutaneous TIDWM    [START ON 6/25/2022] insulin detemir U-100  24 Units Subcutaneous Daily    LIDOcaine HCL 20 mg/ml (2%)  20 mL Other Once    methocarbamoL  500 mg Oral QID    mirtazapine  15 mg Oral Nightly    pantoprazole  40 mg Oral Daily    polyethylene glycol  17 g Oral BID    potassium chloride  30 mEq Oral BID    senna-docusate 8.6-50 mg  1 tablet Oral Daily    sucralfate  1 g Oral Nightly    tolvaptan  15 mg Oral Once    vancomycin (VANCOCIN) IVPB  1,000 mg Intravenous Q12H     PRN Meds:sodium chloride, sodium chloride, sodium chloride, ALPRAZolam, bisacodyL, dextrose 10%, dextrose 10%, diphenhydrAMINE, glucagon (human recombinant), glucose, glucose, insulin aspart U-100, magnesium oxide, magnesium oxide, mupirocin, potassium bicarbonate, potassium bicarbonate, potassium bicarbonate, potassium, sodium phosphates, potassium, sodium phosphates, potassium, sodium phosphates, promethazine, sodium chloride  0.9%, sodium chloride 0.9%, Pharmacy to dose Vancomycin consult **AND** vancomycin - pharmacy to dose    Review of patient's allergies indicates:   Allergen Reactions    Aspirin Other (See Comments)     Mr. Thacker is enrolled in Dr. Paige's Alon Trial and cannot have any aspirin and/or aspirin-containing products. DO NOT cancel any orders for INV Aspirin 100 mg/Placebo. If you have questions, please contact Isabel @ [phone], [phone], secure chat or MS Teams message.    Bumex [bumetanide] Hives    Lactose Diarrhea     Other reaction(s): Abdominal distension, gaseous    Torsemide Hives     Objective:     Vital Signs (Most Recent):  Temp: 98.3 °F (36.8 °C) (06/24/22 0701)  Pulse: 104 (06/24/22 1100)  Resp: 18 (06/24/22 1100)  BP: 112/64 (06/24/22 1100)  SpO2: 100 % (06/24/22 1100)   Vital Signs (24h Range):  Temp:  [98.3 °F (36.8 °C)-98.7 °F (37.1 °C)] 98.3 °F (36.8 °C)  Pulse:  [102-118] 104  Resp:  [13-23] 18  SpO2:  [93 %-100 %] 100 %  BP: ()/(59-90) 112/64     Patient Vitals for the past 72 hrs (Last 3 readings):   Weight   06/22/22 0300 96.2 kg (212 lb 1.3 oz)       Body mass index is 26.51 kg/m².      Intake/Output Summary (Last 24 hours) at 6/24/2022 1141  Last data filed at 6/24/2022 1101  Gross per 24 hour   Intake 2451.07 ml   Output 5785 ml   Net -3333.93 ml         Hemodynamic Parameters:       Telemetry: ST    Physical Exam  Constitutional:       Appearance: Normal appearance.   HENT:      Head: Normocephalic and atraumatic.   Eyes:      Conjunctiva/sclera: Conjunctivae normal.      Pupils: Pupils are equal, round, and reactive to light.   Neck:      Comments: RIJ TLC  Cardiovascular:      Rate and Rhythm: Regular rhythm. Tachycardia present.      Comments: +S3  Pulmonary:      Effort: Pulmonary effort is normal.      Breath sounds: Normal breath sounds.   Abdominal:      General: Bowel sounds are normal. There is distension.   Musculoskeletal:         General: No  swelling. Normal range of motion.      Cervical back: Neck supple.   Skin:     General: Skin is warm and dry.      Capillary Refill: Capillary refill takes 2 to 3 seconds.   Neurological:      General: No focal deficit present.      Mental Status: He is alert and oriented to person, place, and time.   Psychiatric:         Mood and Affect: Mood normal.         Behavior: Behavior normal.         Thought Content: Thought content normal.         Judgment: Judgment normal.       Significant Labs:  CBC:  Recent Labs   Lab 06/22/22  1347 06/23/22  0246 06/24/22  0330   WBC 12.11 11.31 10.24   RBC 3.42* 3.37* 3.27*   HGB 10.0* 9.9* 9.6*   HCT 29.6* 29.7* 29.5*    383 411   MCV 87 88 90   MCH 29.2 29.4 29.4   MCHC 33.8 33.3 32.5       BNP:  No results for input(s): BNP in the last 168 hours.    Invalid input(s): BNPTRIAGELBLO    CMP:  Recent Labs   Lab 06/22/22  0344 06/22/22  1347 06/23/22  0246 06/23/22  1519 06/24/22  0330   *   < > 352* 347* 349*   CALCIUM 9.8   < > 9.5 9.8 9.2   ALBUMIN 3.4*  --  3.4*  --  3.4*   PROT 8.0  --  7.9  --  7.4   *   < > 129* 129* 129*   K 3.6   < > 3.8 3.8 4.1   CO2 30*   < > 31* 28 30*   CL 89*   < > 87* 88* 87*   BUN 32*   < > 39* 36* 38*   CREATININE 1.8*   < > 1.7* 1.9* 1.9*   ALKPHOS 113  --  117  --  131   ALT 18  --  21  --  19   AST 24  --  23  --  22   BILITOT 1.3*  --  1.0  --  1.0    < > = values in this interval not displayed.        Coagulation:   Recent Labs   Lab 06/20/22  1607 06/21/22  0418 06/22/22  1347 06/23/22  0246 06/24/22  0330   INR 1.0  --   --   --   --    APTT  --    < > 49.5* 49.0* 40.2*    < > = values in this interval not displayed.       LDH:  Recent Labs   Lab 06/23/22  0246   *       Microbiology:  Microbiology Results (last 7 days)       Procedure Component Value Units Date/Time    Blood culture [[id number]]  (Abnormal) Collected: 06/21/22 7903    Order Status: Completed Specimen: Blood from Peripheral, Wrist, Left Updated:  06/24/22 0911     Blood Culture, Routine Gram stain dudley bottle: Gram positive cocci in clusters resembling Staph      Results called to and read back by: Rosa Pardo RN. 06/22/2022  23:29      Gram stain aer bottle: Gram positive cocci in clusters resembling Staph       Positive results previously called 06/23/2022  04:33      STAPHYLOCOCCUS SPECIES  Identification and susceptibility pending      Blood culture [[id number]]  (Abnormal) Collected: 06/21/22 2250    Order Status: Completed Specimen: Blood from Peripheral, Wrist, Left Updated: 06/24/22 0751     Blood Culture, Routine Gram stain dudley bottle: Gram positive cocci      Results called to and read back by: Arnulfo Herrera RN. 06/22/2022  17:46      Gram stain aer bottle: Gram positive cocci in clusters resembling Staph       Positive results previously called 06/23/2022  00:11      STAPHYLOCOCCUS EPIDERMIDIS  Susceptibility pending      Blood culture [[id number]]  (Abnormal) Collected: 06/20/22 1651    Order Status: Completed Specimen: Blood from Peripheral, Hand, Left Updated: 06/24/22 0718     Blood Culture, Routine Gram stain dudley bottle: Gram positive cocci in clusters resembling Staph      Results called to and read back by:Tara López RN 06/21/2022  22:16      STAPHYLOCOCCUS EPIDERMIDIS  Susceptibility pending      Blood culture [[id number]] Collected: 06/23/22 0105    Order Status: Completed Specimen: Blood from Peripheral, Antecubital, Right Updated: 06/24/22 0613     Blood Culture, Routine No Growth to date      No Growth to date    Blood culture [[id number]] Collected: 06/23/22 0108    Order Status: Completed Specimen: Blood from Peripheral, Hand, Right Updated: 06/24/22 0613     Blood Culture, Routine No Growth to date      No Growth to date    Blood culture [[id number]]     Order Status: Canceled Specimen: Blood             I have reviewed all pertinent labs within the past 24 hours.    Estimated Creatinine Clearance: 63.6 mL/min (A) (based on SCr of  1.9 mg/dL (H)).    Diagnostic Results:  I have reviewed and interpreted all pertinent imaging results/findings within the past 24 hours.

## 2022-06-24 NOTE — SUBJECTIVE & OBJECTIVE
"Interval HPI:   Overnight events: No acute events overnight. Of note, LVAD delayed endocrine not notified to resume prandial insulin.  Patient on the CICU in room [id number] A. Blood glucose stable. BG at and above goal on current insulin regimen (SSI + basal). Steroid use- None. 1 Day Post-Op  Renal function- Abnormal - Creatinine 1.8   Vasopressors-  Epinephrine       Diet Cardiac     Eating:   NPO  Nausea: No  Hypoglycemia and intervention: No  Fever: No  TPN and/or TF: No    /83 (BP Location: Right arm, Patient Position: Lying)   Pulse 107   Temp 97.9 °F (36.6 °C) (Oral)   Resp 20   Ht 6' 3" (1.905 m)   Wt 96.2 kg (212 lb 1.3 oz)   SpO2 99%   BMI 26.51 kg/m²     Labs Reviewed and Include    Recent Labs   Lab 06/24/22  0330 06/24/22  1150   *  --    CALCIUM 9.2  --    ALBUMIN 3.4*  --    PROT 7.4  --    * 130*   K 4.1  --    CO2 30*  --    CL 87*  --    BUN 38*  --    CREATININE 1.9*  --    ALKPHOS 131  --    ALT 19  --    AST 22  --    BILITOT 1.0  --      Lab Results   Component Value Date    WBC 10.24 06/24/2022    HGB 9.6 (L) 06/24/2022    HCT 29.5 (L) 06/24/2022    MCV 90 06/24/2022     06/24/2022     No results for input(s): TSH, FREET4 in the last 168 hours.  Lab Results   Component Value Date    HGBA1C 9.2 (H) 05/20/2022       Nutritional status:   Body mass index is 26.51 kg/m².  Lab Results   Component Value Date    ALBUMIN 3.4 (L) 06/24/2022    ALBUMIN 3.4 (L) 06/23/2022    ALBUMIN 3.4 (L) 06/22/2022     Lab Results   Component Value Date    PREALBUMIN 36 04/18/2022       Estimated Creatinine Clearance: 63.6 mL/min (A) (based on SCr of 1.9 mg/dL (H)).    Accu-Checks  Recent Labs     06/22/22  1751 06/22/22  2140 06/23/22  0051 06/23/22  0521 06/23/22  0523 06/23/22  0842 06/23/22  1843 06/23/22  2319 06/24/22  0709 06/24/22  1145   POCTGLUCOSE 236* 285* 349* 331* 336* 273* 347* 357* 345* 397*       Current Medications and/or Treatments Impacting Glycemic " Control  Immunotherapy:    Immunosuppressants       None          Steroids:   Hormones (From admission, onward)                None          Pressors:    Autonomic Drugs (From admission, onward)                Start     Stop Route Frequency Ordered    06/13/22 2130  EPINEPHrine (ADRENALIN) 5 mg in dextrose 5 % 250 mL infusion         -- IV Continuous 06/13/22 2024          Hyperglycemia/Diabetes Medications:   Antihyperglycemics (From admission, onward)                Start     Stop Route Frequency Ordered    06/25/22 0900  insulin detemir U-100 pen 24 Units         -- SubQ Daily 06/24/22 1117    06/24/22 1230  insulin aspart U-100 pen 8 Units         -- SubQ 3 times daily with meals 06/24/22 1115    06/24/22 1215  insulin aspart U-100 pen 1-10 Units         -- SubQ Before meals & nightly PRN 06/24/22 1115

## 2022-06-24 NOTE — ASSESSMENT & PLAN NOTE
Titrate insulin slowly to avoid hypoglycemia as the risk of hypoglycemia increases with decreased creatinine clearance.    Estimated Creatinine Clearance: 63.6 mL/min (A) (based on SCr of 1.9 mg/dL (H)).

## 2022-06-24 NOTE — ASSESSMENT & PLAN NOTE
- Admit sCr 1.8, baseline ~ 1.5-1.7  - Creatinine elevated to 2.0 but improved after fluid.    - Creatinine has been stable, but increased to 1.9 today   - Will monitor response

## 2022-06-24 NOTE — PROGRESS NOTES
Pharmacokinetic Assessment Follow Up: IV Vancomycin    Vancomycin serum concentration assessment(s):    Dose held erroneously. Continue current dose - suspect accumulation will enable therapeutic goal of 15-20 for GP bacteremia. Renal function elevated, but stable. Repeat trough prior to the fifth dose on 6/26 @ 1:00 (or earlier if RF deteriorates)    Drug levels (last 3 results):  Recent Labs   Lab Result Units 06/24/22  0111   Vancomycin-Trough ug/mL 14.4       Pharmacy will continue to follow and monitor vancomycin.    Please contact pharmacy at [phone] for questions regarding this assessment.    Thank you for the consult,   Alex Torres         CBC (last 72 hours):  Recent Labs   Lab Result Units 06/22/22  0344 06/22/22  1347 06/23/22  0246 06/24/22  0330   WBC K/uL 12.93* 12.11 11.31 10.24   Hemoglobin g/dL 10.1* 10.0* 9.9* 9.6*   Hematocrit % 30.3* 29.6* 29.7* 29.5*   Platelets K/uL 350 376 383 411   Gran % % 80.7* 76.2* 80.4* 69.9   Lymph % % 10.5* 15.8* 11.7* 18.6   Mono % % 8.0 6.4 6.8 9.4   Eosinophil % % 0.2 0.5 0.4 1.0   Basophil % % 0.3 0.2 0.3 0.6   Differential Method  Automated Automated Automated Automated       Metabolic Panel (last 72 hours):  Recent Labs   Lab Result Units 06/21/22  1459 06/21/22  1903 06/22/22  0344 06/22/22  1347 06/23/22  0246 06/23/22  1519 06/24/22  0330   Sodium mmol/L 132* 132* 132* 130* 129* 129* 129*   Potassium mmol/L 4.2 4.0 3.6 3.6 3.8 3.8 4.1   Chloride mmol/L 92* 90* 89* 87* 87* 88* 87*   CO2 mmol/L 29 29 30* 33* 31* 28 30*   Glucose mg/dL 237* 203* 210* 331* 352* 347* 349*   BUN mg/dL 30* 30* 32* 37* 39* 36* 38*   Creatinine mg/dL 1.7* 1.5* 1.8* 1.8* 1.7* 1.9* 1.9*   Albumin g/dL  --   --  3.4*  --  3.4*  --  3.4*   Total Bilirubin mg/dL  --   --  1.3*  --  1.0  --  1.0   Alkaline Phosphatase U/L  --   --  113  --  117  --  131   AST U/L  --   --  24  --  23  --  22   ALT U/L  --   --  18  --  21  --  19   Magnesium mg/dL 1.9  --  1.8  --  2.1  --  2.2        Vancomycin Administrations:  vancomycin given in the last 96 hours                   vancomycin in dextrose 5 % 1 gram/250 mL IVPB 1,000 mg (mg) 1,000 mg New Bag 06/24/22 0212     1,000 mg New Bag 06/23/22 1318     1,000 mg New Bag  0114    vancomycin 1.5 g in dextrose 5 % 250 mL IVPB (ready to mix) (mg) 1,500 mg New Bag 06/22/22 1409                Microbiologic Results:  Microbiology Results (last 7 days)     Procedure Component Value Units Date/Time    Blood culture [[id number]]  (Abnormal) Collected: 06/21/22 2250    Order Status: Completed Specimen: Blood from Peripheral, Wrist, Left Updated: 06/24/22 0751     Blood Culture, Routine Gram stain dudley bottle: Gram positive cocci      Results called to and read back by: Arnulfo Herrera RN. 06/22/2022  17:46      Gram stain aer bottle: Gram positive cocci in clusters resembling Staph       Positive results previously called 06/23/2022  00:11      STAPHYLOCOCCUS EPIDERMIDIS  Susceptibility pending      Blood culture [[id number]]  (Abnormal) Collected: 06/20/22 1651    Order Status: Completed Specimen: Blood from Peripheral, Hand, Left Updated: 06/24/22 0718     Blood Culture, Routine Gram stain dudley bottle: Gram positive cocci in clusters resembling Staph      Results called to and read back by:Tara López RN 06/21/2022  22:16      STAPHYLOCOCCUS EPIDERMIDIS  Susceptibility pending      Blood culture [[id number]] Collected: 06/23/22 0105    Order Status: Completed Specimen: Blood from Peripheral, Antecubital, Right Updated: 06/24/22 0613     Blood Culture, Routine No Growth to date      No Growth to date    Blood culture [[id number]] Collected: 06/23/22 0108    Order Status: Completed Specimen: Blood from Peripheral, Hand, Right Updated: 06/24/22 0613     Blood Culture, Routine No Growth to date      No Growth to date    Blood culture [[id number]] Collected: 06/21/22 2250    Order Status: Completed Specimen: Blood from Peripheral, Wrist, Left Updated: 06/23/22 0433      Blood Culture, Routine Gram stain dudley bottle: Gram positive cocci in clusters resembling Staph      Results called to and read back by: Rosa Pardo RN. 06/22/2022  23:29      Gram stain aer bottle: Gram positive cocci in clusters resembling Staph       Positive results previously called 06/23/2022  04:33    Blood culture [[id number]]     Order Status: Canceled Specimen: Blood

## 2022-06-24 NOTE — EICU
Intervention Initiated From:  Bedside    Thomas Communicated with Bedside Nurse regarding:  Time-Out      Comments: Called to the room to assist Dr. Silva with a timeout for RIJTLC placement.

## 2022-06-24 NOTE — PROGRESS NOTES
[facility] - [facility] Int[facility]  Infectious Disease  Progress Note    Patient Name: Kevan Queen  MRN: [id number]  Admission Date: 6/13/2022  Length of Stay: 11 days  Attending Physician: Luca Lopez Jr.,*  Primary Care Provider: ORALIA Cline    Isolation Status: No active isolations  Assessment/Plan:      Staphylococcus epidermidis bacteremia  37-year-old male with history of NIDCM with BiV systolic heart failure on home milrinone presented 6/13/2022 for ADHF, IABP placed 6/13/2022, now with Staph epi bacteremia, vancomycin initiated 6/22/2022. IABP and IJ line exchanged on 6/23/2022. Repeat blood cultures from 6/23/2022 positive - but this was prior to IABL and LIJ exchange.    Recommendations:  - Continue vancomycin IV, goal 15-20  - Follow-up staph epi susceptibilities  - Repeat blood cultures 6/24/2022  - If blood cultures remain positive persistently, consider GUILLERMO to evaluate for ICD endocarditis  - Hold off on LVAD implantation at this time            Thank you for your consult. Dr. Melly Horta will follow-up with patient. Please contact us if you have any additional questions.    Kavya Jeffrey MD  Infectious Disease  [facility] - [facility]    Subjective:     Principal Problem:Acute on chronic combined systolic and diastolic heart failure, NYHA class 4    HPI: 37-year-old male with history of NIDCM with BiV systolic heart failure on home milrinone presented 6/13/2022 for ADHF. Patient not current candidate for OHT as recently quit smoking 4/2022. IABP placed 6/13/2022, central line exchanged 6/20/2022. Blood culture x1 drawn on 6/20/2022, returned with GPC. Repeat blood cultures drawn 6/21/2022 x2. Patient reports tenderness around right femoral IABP - started once IABP was placed. Denied any worsening pain around right femoral line.    Interval History:  LIJ moved to [facility]  No fevers overnight    Review of Systems   Constitutional:  Negative for chills, diaphoresis and fever.    HENT:  Negative for rhinorrhea and sore throat.    Respiratory:  Negative for cough and shortness of breath.    Cardiovascular:  Negative for chest pain and leg swelling.   Gastrointestinal:  Negative for abdominal pain, diarrhea, nausea and vomiting.   Genitourinary:  Negative for dysuria and hematuria.   Musculoskeletal:  Negative for arthralgias and myalgias.   Skin:  Negative for rash.   Neurological:  Negative for headaches.   Objective:     Vital Signs (Most Recent):  Temp: 97.9 °F (36.6 °C) (06/24/22 1500)  Pulse: 107 (06/24/22 1514)  Resp: 18 (06/24/22 1500)  BP: 124/72 (06/24/22 1500)  SpO2: 99 % (06/24/22 1500) Vital Signs (24h Range):  Temp:  [97.9 °F (36.6 °C)-98.7 °F (37.1 °C)] 97.9 °F (36.6 °C)  Pulse:  [102-118] 107  Resp:  [13-23] 18  SpO2:  [93 %-100 %] 99 %  BP: ()/(59-90) 124/72     Weight: 96.2 kg (212 lb 1.3 oz)  Body mass index is 26.51 kg/m².    Estimated Creatinine Clearance: 75.6 mL/min (A) (based on SCr of 1.6 mg/dL (H)).    Physical Exam  Vitals reviewed.   Constitutional:       General: He is not in acute distress.     Appearance: He is well-developed. He is not diaphoretic.   HENT:      Head: Normocephalic and atraumatic.   Eyes:      Conjunctiva/sclera: Conjunctivae normal.   Neck:      Comments: RIJ line  Pulmonary:      Effort: Pulmonary effort is normal. No respiratory distress.   Abdominal:      General: There is no distension.      Palpations: Abdomen is soft.   Musculoskeletal:         General: Normal range of motion.   Skin:     General: Skin is warm and dry.      Findings: No erythema or rash.      Comments: Left fem line in place   Neurological:      Mental Status: He is alert.       Significant Labs: All pertinent labs within the past 24 hours have been reviewed.    Significant Imaging: I have reviewed all pertinent imaging results/findings within the past 24 hours.

## 2022-06-24 NOTE — CARE UPDATE
Hemodynamics Care Update Note    SVO2: 40  CVP: 15  CO: 4.3  CI: 1.9  SVR: 1209  I: 1.3 O: 2.5   (calculated using oxygen consumption constant of 3.5)       Plan:  R IJ placed. Will remove L IJ and R subclavian   No further changes at this time      Case discussed with on call HTS attending.    Minor Silva, PGY-4  Cardiovascular Disease  [facility]

## 2022-06-24 NOTE — ASSESSMENT & PLAN NOTE
- [facility]  - Approved for LVAD at [facility] on 4/2/22. Was not evaluated for OHTx as he quit smoking in April 2022  - Moved to ICU 6/13 in the setting of significant volume overloaded and low output state. IABP placed 6/13 and Epi and Marcella added for RV support.   - IABP exchanged 6/23  - Lasix gtt D/C'd 6/17 secondary to increase in sCr. Given IV NS and sCr improved  - Lasix drip restarted 6/23 at 40 mg/hr and now at 30mg/hr. Na 129, will give 15 mg tolvaptan   - IABP 1:1, IV milrinone 0.375, epi 0.04, and Marcella 10 ppm  - CVP: 15, SVO 40, CO: 4.9, CI: 2.2, SVR: 1322  - TTE 6/21: LVEF 15%, LVEDD: 7.14, TAPSE: 1.29 severe MR, mod TR. Mild RVE with mod reduced RV failure   - RHC done 4/19/22 on Milrinone 0.5 mcg/kg/min: RA 11, PA 50/27 (35), W 27, CO/CI 3.7/1.7, PVR 2.2 and SVR 1535   - GDMT: Entresto and Aldactone on hold since admit 5/24-5/27 2/2 elevated sCr  - Reports dry weight is 217#, and that he had gained 6# on his home scale PTA  - Tentative VAD implant 6/29. Unlikely OHTx candidate with smoking history (quit April 2022)

## 2022-06-24 NOTE — PROGRESS NOTES
06/24/2022  Casey Arizmendi    Current provider:  Luca Lopez Jr.,*    Device interrogation:  No flowsheet data found.       Rounded on Kevan Queen to ensure all mechanical assist device settings (IABP or VAD) were appropriate and all parameters were within limits.  I was able to ensure all back up equipment was present, the staff had no issues, and the Perfusion Department daily rounding was complete.      For implantable VADs: Interrogation of Ventricular assist device was performed with analysis of device parameters and review of device function. I have personally reviewed the interrogation findings and agree with findings as stated.     In emergency, the nursing units have been notified to contact the perfusion department either by:  Calling [id number] from 630am to 4pm Mon thru Fri, utilizing the On-Call Finder functionality of Epic and searching for Perfusion, or by contacting the hospital  from 4pm to 630am and on weekends and asking to speak with the perfusionist on call.    7:53 AM

## 2022-06-24 NOTE — ASSESSMENT & PLAN NOTE
-Blood cultures 6/20 and repeat 6/21 positive for Staph Epi. Most recent blood cultures 6/23 NGTD   -IJ exchanged on 6/23, IABP exchanged 6/23  -Started on Empiric Vanc 6/22  -ID consulted given upcoming planned LVAD, continue Vanc, recommend GUILLERMO if cultures remain positive.

## 2022-06-24 NOTE — SUBJECTIVE & OBJECTIVE
Interval History:  LIJ moved to [facility]  No fevers overnight    Review of Systems   Constitutional:  Negative for chills, diaphoresis and fever.   HENT:  Negative for rhinorrhea and sore throat.    Respiratory:  Negative for cough and shortness of breath.    Cardiovascular:  Negative for chest pain and leg swelling.   Gastrointestinal:  Negative for abdominal pain, diarrhea, nausea and vomiting.   Genitourinary:  Negative for dysuria and hematuria.   Musculoskeletal:  Negative for arthralgias and myalgias.   Skin:  Negative for rash.   Neurological:  Negative for headaches.   Objective:     Vital Signs (Most Recent):  Temp: 97.9 °F (36.6 °C) (06/24/22 1500)  Pulse: 107 (06/24/22 1514)  Resp: 18 (06/24/22 1500)  BP: 124/72 (06/24/22 1500)  SpO2: 99 % (06/24/22 1500) Vital Signs (24h Range):  Temp:  [97.9 °F (36.6 °C)-98.7 °F (37.1 °C)] 97.9 °F (36.6 °C)  Pulse:  [102-118] 107  Resp:  [13-23] 18  SpO2:  [93 %-100 %] 99 %  BP: ()/(59-90) 124/72     Weight: 96.2 kg (212 lb 1.3 oz)  Body mass index is 26.51 kg/m².    Estimated Creatinine Clearance: 75.6 mL/min (A) (based on SCr of 1.6 mg/dL (H)).    Physical Exam  Vitals reviewed.   Constitutional:       General: He is not in acute distress.     Appearance: He is well-developed. He is not diaphoretic.   HENT:      Head: Normocephalic and atraumatic.   Eyes:      Conjunctiva/sclera: Conjunctivae normal.   Neck:      Comments: RIJ line  Pulmonary:      Effort: Pulmonary effort is normal. No respiratory distress.   Abdominal:      General: There is no distension.      Palpations: Abdomen is soft.   Musculoskeletal:         General: Normal range of motion.   Skin:     General: Skin is warm and dry.      Findings: No erythema or rash.      Comments: Left fem line in place   Neurological:      Mental Status: He is alert.       Significant Labs: All pertinent labs within the past 24 hours have been reviewed.    Significant Imaging: I have reviewed all pertinent imaging  results/findings within the past 24 hours.

## 2022-06-24 NOTE — PROGRESS NOTES
"[facility] - Cardiac Intensive Care  Endocrinology  Progress Note    Admit Date: 6/13/2022     Reason for Consult: Management of  type 2 DM, Hyperglycemia     Surgical Procedure and Date: none    Diabetes diagnosis year: 4/21/21    Home Diabetes Medications:  Detemir  23  units daily and Aspart   12  units TIDWM and  Mod dose correction insulin    Lab Results   Component Value Date    HGBA1C 9.2 (H) 05/20/2022           How often checking glucose at home? 1-3 x day   BG readings on regimen: 180- up to 300 once  Hypoglycemia on the regimen?  yes once- related to not really eating after taking aspart   Missed doses on regimen?  no    Diabetes Complications include:     Hyperglycemia    Complicating diabetes co morbidities:   History of MI, CHF, and CKD      HPI:   Patient is a 37 y.o. male with a diagnosis of type 2 DM and NIDCM with BiV systolic heart failure. Patient was presented at [facility] 4/2/22  and was  approved for VAD. Also recently admitted with [facility]; he had clinic appointment last week to f/u recent admit for hyperglycemic hyperosmolar syndrome but did not come as he was "feeling too bad" presents to  ED with SOB. Endocrinology consulted for BG/ DM management.                Interval HPI:   Overnight events: No acute events overnight. Of note, LVAD delayed endocrine not notified to resume prandial insulin.  Patient on the CICU in room [id number] A. Blood glucose stable. BG at and above goal on current insulin regimen (SSI + basal). Steroid use- None. 1 Day Post-Op  Renal function- Abnormal - Creatinine 1.8   Vasopressors-  Epinephrine       Diet Cardiac     Eating:   NPO  Nausea: No  Hypoglycemia and intervention: No  Fever: No  TPN and/or TF: No    /83 (BP Location: Right arm, Patient Position: Lying)   Pulse 107   Temp 97.9 °F (36.6 °C) (Oral)   Resp 20   Ht 6' 3" (1.905 m)   Wt 96.2 kg (212 lb 1.3 oz)   SpO2 99%   BMI 26.51 kg/m²     Labs Reviewed and Include    Recent Labs   Lab " 06/24/22  0330 06/24/22  1150   *  --    CALCIUM 9.2  --    ALBUMIN 3.4*  --    PROT 7.4  --    * 130*   K 4.1  --    CO2 30*  --    CL 87*  --    BUN 38*  --    CREATININE 1.9*  --    ALKPHOS 131  --    ALT 19  --    AST 22  --    BILITOT 1.0  --      Lab Results   Component Value Date    WBC 10.24 06/24/2022    HGB 9.6 (L) 06/24/2022    HCT 29.5 (L) 06/24/2022    MCV 90 06/24/2022     06/24/2022     No results for input(s): TSH, FREET4 in the last 168 hours.  Lab Results   Component Value Date    HGBA1C 9.2 (H) 05/20/2022       Nutritional status:   Body mass index is 26.51 kg/m².  Lab Results   Component Value Date    ALBUMIN 3.4 (L) 06/24/2022    ALBUMIN 3.4 (L) 06/23/2022    ALBUMIN 3.4 (L) 06/22/2022     Lab Results   Component Value Date    PREALBUMIN 36 04/18/2022       Estimated Creatinine Clearance: 63.6 mL/min (A) (based on SCr of 1.9 mg/dL (H)).    Accu-Checks  Recent Labs     06/22/22  1751 06/22/22  2140 06/23/22  0051 06/23/22  0521 06/23/22  0523 06/23/22  0842 06/23/22  1843 06/23/22  2319 06/24/22  0709 06/24/22  1145   POCTGLUCOSE 236* 285* 349* 331* 336* 273* 347* 357* 345* 397*       Current Medications and/or Treatments Impacting Glycemic Control  Immunotherapy:    Immunosuppressants       None          Steroids:   Hormones (From admission, onward)                None          Pressors:    Autonomic Drugs (From admission, onward)                Start     Stop Route Frequency Ordered    06/13/22 2130  EPINEPHrine (ADRENALIN) 5 mg in dextrose 5 % 250 mL infusion         -- IV Continuous 06/13/22 2024          Hyperglycemia/Diabetes Medications:   Antihyperglycemics (From admission, onward)                Start     Stop Route Frequency Ordered    06/25/22 0900  insulin detemir U-100 pen 24 Units         -- SubQ Daily 06/24/22 1117    06/24/22 1230  insulin aspart U-100 pen 8 Units         -- SubQ 3 times daily with meals 06/24/22 1115    06/24/22 1215  insulin aspart U-100 pen  1-10 Units         -- SubQ Before meals & nightly PRN 06/24/22 1115            ASSESSMENT and PLAN    * Acute on chronic combined systolic and diastolic heart failure, NYHA class 4  Optimize glucose control and  avoid hypoglycemia    Managed per primary.       Uncontrolled diabetes mellitus  Endocrinology consulted for BG management.   BG goal 140-180    - Levemir Flex Pen 24 units daily (20% increase due to FBG above goal)   - Resume Novolog 8 units TIDWM   - Novolog (aspart) insulin prn for BG excursions MDC SSI (150/25).   - BG checks q4hr  - Hypoglycemia protocol in place    - Will likely require IIP s/p LVAD    ** Please notify Endocrine for any change and/or advance in diet**  ** Please call Endocrine for any BG related issues **    Discharge Planning:   TBD. Please notify endocrinology prior to discharge.        CKD (chronic kidney disease) stage 3, GFR 30-59 ml/min    Titrate insulin slowly to avoid hypoglycemia as the risk of hypoglycemia increases with decreased creatinine clearance.    Estimated Creatinine Clearance: 63.6 mL/min (A) (based on SCr of 1.9 mg/dL (H)).           Michael Wyman, DNP, FNP  Endocrinology  [facility] - Cardiac Intensive Care

## 2022-06-24 NOTE — ASSESSMENT & PLAN NOTE
Endocrinology consulted for BG management.   BG goal 140-180    - Levemir Flex Pen 24 units daily (20% increase due to FBG above goal)   - Resume Novolog 8 units TIDWM   - Novolog (aspart) insulin prn for BG excursions MDC SSI (150/25).   - BG checks q4hr  - Hypoglycemia protocol in place    - Will likely require IIP s/p LVAD    ** Please notify Endocrine for any change and/or advance in diet**  ** Please call Endocrine for any BG related issues **    Discharge Planning:   TBD. Please notify endocrinology prior to discharge.

## 2022-06-24 NOTE — PROCEDURE NOTE ADDENDUM
06/23/2022  9:39 PM    Procedure:  Central line  Indication:  Venous access    Risks, benefits, and indications for the procedure were reviewed with patient. Consent was signed and placed in chart. Pt prepped and draped in sterile fashion. The following sterile precautions were followed: cap and mask, hand hygiene, sterile gown and sterile gloves, large sterile sheet and sterile field and 2% Chlorhexidine for cutaneous antisepsis. Time out was performed with nursing staff. Lidocaine 1% injected at site. Ultrasound guidance utilized to visualize anatomy R IJ. Access obtained without difficulty and blood flow was non-pulsatile. Manometry showed estimated CVP of 15. Wire threaded smoothly and US confirmed appropriate venous placement of wire. Catheter advanced without resistance. Pt tolerated procedure well. No evidence of hematoma at vascular access site. CXR has been ordered to confirm appropriate placement.    Minor Silva   Department of Cardiovascular Disease, PGY-4   [facility]

## 2022-06-24 NOTE — PROGRESS NOTES
Noted to team (dr Lopez and Denise ) that  Patients SVO2 was 40 this am.  Stated that 40 was ok with with them.  Plan to give tolvaptam as sodium 129 and recheck hemodynamics at 3pm

## 2022-06-24 NOTE — PROGRESS NOTES
Update    Pt presents in room aaox4 with pleasant affect. Pt's SO Page is present in room. Pt reports coping well with changes in surgery dates and denies questions/concerns at this time. Pt and SO aware of BH reservation for night of surgery and that SW will adjust depending on date of surgery. Providing ongoing psychosocial and counseling support, education, resources, assistance and discharge planning as indicated. Following and available.

## 2022-06-24 NOTE — PROGRESS NOTES
Pharmacokinetic Assessment Follow Up: IV Vancomycin    Vancomycin serum concentration assessment(s):    The trough level was drawn correctly and can be used to guide therapy at this time. The measurement is below the desired definitive target range of 15 to 20 mcg/mL.    Vancomycin Regimen Plan:    Discontinue the scheduled vancomycin regimen and re-dose when the random level is less than 20 mcg/mL, next level to be drawn at 1400 on 6/24.    Drug levels (last 3 results):  Recent Labs   Lab Result Units 06/24/22  0111   Vancomycin-Trough ug/mL 14.4       Pharmacy will continue to follow and monitor vancomycin.    Please contact pharmacy at extension [phone] for questions regarding this assessment.    Thank you for the consult,   Casey Woodall       Patient brief summary:  Kevan Queen is a 37 y.o. male initiated on antimicrobial therapy with IV Vancomycin for treatment of bacteremia    The patient's current regimen is on hold pending new labs for today    Drug Allergies:   Review of patient's allergies indicates:   Allergen Reactions    Aspirin Other (See Comments)     Mr. Thacker is enrolled in Dr. Paige's Alon Trial and cannot have any aspirin and/or aspirin-containing products. DO NOT cancel any orders for INV Aspirin 100 mg/Placebo. If you have questions, please contact Isabel @ [phone], [phone],[email or web address], secure chat or MS Teams message.    Bumex [bumetanide] Hives    Lactose Diarrhea     Other reaction(s): Abdominal distension, gaseous    Torsemide Hives       Actual Body Weight:   96.2kg    Renal Function:   Estimated Creatinine Clearance: 63.6 mL/min (A) (based on SCr of 1.9 mg/dL (H)).,     Dialysis Method (if applicable):  N/A    CBC (last 72 hours):  Recent Labs   Lab Result Units 06/22/22  0344 06/22/22  1347 06/23/22  0246 06/24/22  0330   WBC K/uL 12.93* 12.11 11.31 10.24   Hemoglobin g/dL 10.1* 10.0* 9.9* 9.6*   Hematocrit % 30.3* 29.6* 29.7* 29.5*   Platelets K/uL 350 376 383  411   Gran % % 80.7* 76.2* 80.4* 69.9   Lymph % % 10.5* 15.8* 11.7* 18.6   Mono % % 8.0 6.4 6.8 9.4   Eosinophil % % 0.2 0.5 0.4 1.0   Basophil % % 0.3 0.2 0.3 0.6   Differential Method  Automated Automated Automated Automated       Metabolic Panel (last 72 hours):  Recent Labs   Lab Result Units 06/21/22  1459 06/21/22  1903 06/22/22  0344 06/22/22  1347 06/23/22  0246 06/23/22  1519 06/24/22  0330   Sodium mmol/L 132* 132* 132* 130* 129* 129* 129*   Potassium mmol/L 4.2 4.0 3.6 3.6 3.8 3.8 4.1   Chloride mmol/L 92* 90* 89* 87* 87* 88* 87*   CO2 mmol/L 29 29 30* 33* 31* 28 30*   Glucose mg/dL 237* 203* 210* 331* 352* 347* 349*   BUN mg/dL 30* 30* 32* 37* 39* 36* 38*   Creatinine mg/dL 1.7* 1.5* 1.8* 1.8* 1.7* 1.9* 1.9*   Albumin g/dL  --   --  3.4*  --  3.4*  --  3.4*   Total Bilirubin mg/dL  --   --  1.3*  --  1.0  --  1.0   Alkaline Phosphatase U/L  --   --  113  --  117  --  131   AST U/L  --   --  24  --  23  --  22   ALT U/L  --   --  18  --  21  --  19   Magnesium mg/dL 1.9  --  1.8  --  2.1  --  2.2       Vancomycin Administrations:  vancomycin given in the last 96 hours                   vancomycin in dextrose 5 % 1 gram/250 mL IVPB 1,000 mg (mg) 1,000 mg New Bag 06/24/22 0212     1,000 mg New Bag 06/23/22 1318     1,000 mg New Bag  0114    vancomycin 1.5 g in dextrose 5 % 250 mL IVPB (ready to mix) (mg) 1,500 mg New Bag 06/22/22 1409                Microbiologic Results:  Microbiology Results (last 7 days)     Procedure Component Value Units Date/Time    Blood culture [[id number]]  (Abnormal) Collected: 06/21/22 2250    Order Status: Completed Specimen: Blood from Peripheral, Wrist, Left Updated: 06/23/22 1432     Blood Culture, Routine Gram stain dudley bottle: Gram positive cocci      Results called to and read back by: Arnulfo Herrera RN. 06/22/2022  17:46      Gram stain aer bottle: Gram positive cocci in clusters resembling Staph       Positive results previously called 06/23/2022  00:11      STAPHYLOCOCCUS  EPIDERMIDIS  Susceptibility pending      Blood culture [[id number]]  (Abnormal) Collected: 06/20/22 1651    Order Status: Completed Specimen: Blood from Peripheral, Hand, Left Updated: 06/23/22 1258     Blood Culture, Routine Gram stain dudley bottle: Gram positive cocci in clusters resembling Staph      Results called to and read back by:Tara López RN 06/21/2022  22:16      STAPHYLOCOCCUS EPIDERMIDIS  Susceptibility pending      Blood culture [[id number]] Collected: 06/23/22 0108    Order Status: Completed Specimen: Blood from Peripheral, Hand, Right Updated: 06/23/22 0945     Blood Culture, Routine No Growth to date    Blood culture [[id number]] Collected: 06/23/22 0105    Order Status: Completed Specimen: Blood from Peripheral, Antecubital, Right Updated: 06/23/22 0945     Blood Culture, Routine No Growth to date    Blood culture [[id number]] Collected: 06/21/22 2250    Order Status: Completed Specimen: Blood from Peripheral, Wrist, Left Updated: 06/23/22 0433     Blood Culture, Routine Gram stain dudley bottle: Gram positive cocci in clusters resembling Staph      Results called to and read back by: Rosa Pardo RN. 06/22/2022  23:29      Gram stain aer bottle: Gram positive cocci in clusters resembling Staph       Positive results previously called 06/23/2022  04:33    Blood culture [[id number]]     Order Status: Canceled Specimen: Blood

## 2022-06-24 NOTE — CARE UPDATE
2100- R subclavian TLC pulled per Dr Silva. Pressure held for 10 minutes, no complications    2130- new R IJ TLC placed by Dr Silva at bedside. Time-out completed with EICU.     2215- Line confirmed with xray, received order to pull L IJ TLC/  All continuous gtts switched to R IJ with new lines and medication bags.    2300 LIJ TLC pulled. Pressure held for 10 minutes, no complications

## 2022-06-25 LAB
ALBUMIN SERPL BCP-MCNC: 3.6 G/DL (ref 3.5–5.2)
ALLENS TEST: ABNORMAL
ALP SERPL-CCNC: 156 U/L (ref 55–135)
ALT SERPL W/O P-5'-P-CCNC: 30 U/L (ref 10–44)
ANION GAP SERPL CALC-SCNC: 12 MMOL/L (ref 8–16)
ANION GAP SERPL CALC-SCNC: 16 MMOL/L (ref 8–16)
APTT BLDCRRT: 44.7 SEC (ref 21–32)
AST SERPL-CCNC: 34 U/L (ref 10–40)
BACTERIA BLD CULT: ABNORMAL
BASOPHILS # BLD AUTO: 0.05 K/UL (ref 0–0.2)
BASOPHILS NFR BLD: 0.4 % (ref 0–1.9)
BILIRUB SERPL-MCNC: 1 MG/DL (ref 0.1–1)
BUN SERPL-MCNC: 35 MG/DL (ref 6–20)
BUN SERPL-MCNC: 35 MG/DL (ref 6–20)
CA-I BLDV-SCNC: 1.17 MMOL/L (ref 1.06–1.42)
CALCIUM SERPL-MCNC: 10.4 MG/DL (ref 8.7–10.5)
CALCIUM SERPL-MCNC: 10.7 MG/DL (ref 8.7–10.5)
CHLORIDE SERPL-SCNC: 91 MMOL/L (ref 95–110)
CHLORIDE SERPL-SCNC: 92 MMOL/L (ref 95–110)
CO2 SERPL-SCNC: 27 MMOL/L (ref 23–29)
CO2 SERPL-SCNC: 31 MMOL/L (ref 23–29)
CREAT SERPL-MCNC: 1.8 MG/DL (ref 0.5–1.4)
CREAT SERPL-MCNC: 1.8 MG/DL (ref 0.5–1.4)
DELSYS: ABNORMAL
DIFFERENTIAL METHOD: ABNORMAL
EOSINOPHIL # BLD AUTO: 0.1 K/UL (ref 0–0.5)
EOSINOPHIL NFR BLD: 0.9 % (ref 0–8)
ERYTHROCYTE [DISTWIDTH] IN BLOOD BY AUTOMATED COUNT: 14.6 % (ref 11.5–14.5)
EST. GFR  (AFRICAN AMERICAN): 54.4 ML/MIN/1.73 M^2
EST. GFR  (AFRICAN AMERICAN): 54.4 ML/MIN/1.73 M^2
EST. GFR  (NON AFRICAN AMERICAN): 47 ML/MIN/1.73 M^2
EST. GFR  (NON AFRICAN AMERICAN): 47 ML/MIN/1.73 M^2
FLOW: 4
GLUCOSE SERPL-MCNC: 133 MG/DL (ref 70–110)
GLUCOSE SERPL-MCNC: 263 MG/DL (ref 70–110)
HCO3 UR-SCNC: 34 MMOL/L (ref 24–28)
HCO3 UR-SCNC: 34.4 MMOL/L (ref 24–28)
HCO3 UR-SCNC: 34.7 MMOL/L (ref 24–28)
HCO3 UR-SCNC: 34.7 MMOL/L (ref 24–28)
HCT VFR BLD AUTO: 32.5 % (ref 40–54)
HGB BLD-MCNC: 10.5 G/DL (ref 14–18)
IMM GRANULOCYTES # BLD AUTO: 0.06 K/UL (ref 0–0.04)
IMM GRANULOCYTES NFR BLD AUTO: 0.5 % (ref 0–0.5)
LACTATE SERPL-SCNC: 1.3 MMOL/L (ref 0.5–2.2)
LDH SERPL L TO P-CCNC: 379 U/L (ref 110–260)
LYMPHOCYTES # BLD AUTO: 2 K/UL (ref 1–4.8)
LYMPHOCYTES NFR BLD: 15.9 % (ref 18–48)
MAGNESIUM SERPL-MCNC: 2.3 MG/DL (ref 1.6–2.6)
MCH RBC QN AUTO: 28.7 PG (ref 27–31)
MCHC RBC AUTO-ENTMCNC: 32.3 G/DL (ref 32–36)
MCV RBC AUTO: 89 FL (ref 82–98)
METHEMOGLOBIN: 0.3 % (ref 0–3)
MODE: ABNORMAL
MONOCYTES # BLD AUTO: 1 K/UL (ref 0.3–1)
MONOCYTES NFR BLD: 7.7 % (ref 4–15)
NEUTROPHILS # BLD AUTO: 9.3 K/UL (ref 1.8–7.7)
NEUTROPHILS NFR BLD: 74.6 % (ref 38–73)
NRBC BLD-RTO: 0 /100 WBC
PCO2 BLDA: 53.7 MMHG (ref 35–45)
PCO2 BLDA: 56.5 MMHG (ref 35–45)
PCO2 BLDA: 56.9 MMHG (ref 35–45)
PCO2 BLDA: 57.6 MMHG (ref 35–45)
PH SMN: 7.39 [PH] (ref 7.35–7.45)
PH SMN: 7.42 [PH] (ref 7.35–7.45)
PLATELET # BLD AUTO: 445 K/UL (ref 150–450)
PMV BLD AUTO: 10 FL (ref 9.2–12.9)
PO2 BLDA: 24 MMHG (ref 40–60)
PO2 BLDA: 25 MMHG (ref 40–60)
PO2 BLDA: 27 MMHG (ref 40–60)
PO2 BLDA: 29 MMHG (ref 40–60)
POC BE: 10 MMOL/L
POC BE: 10 MMOL/L
POC BE: 9 MMOL/L
POC BE: 9 MMOL/L
POC SATURATED O2: 42 % (ref 95–100)
POC SATURATED O2: 43 % (ref 95–100)
POC SATURATED O2: 47 % (ref 95–100)
POC SATURATED O2: 53 % (ref 95–100)
POC SVO2: 43 %
POC TCO2: 36 MMOL/L (ref 24–29)
POCT GLUCOSE: 136 MG/DL (ref 70–110)
POCT GLUCOSE: 162 MG/DL (ref 70–110)
POCT GLUCOSE: 234 MG/DL (ref 70–110)
POCT GLUCOSE: 294 MG/DL (ref 70–110)
POCT GLUCOSE: 309 MG/DL (ref 70–110)
POCT GLUCOSE: 408 MG/DL (ref 70–110)
POCT GLUCOSE: 459 MG/DL (ref 70–110)
POTASSIUM SERPL-SCNC: 3.9 MMOL/L (ref 3.5–5.1)
POTASSIUM SERPL-SCNC: 4 MMOL/L (ref 3.5–5.1)
PROT SERPL-MCNC: 8.6 G/DL (ref 6–8.4)
RBC # BLD AUTO: 3.66 M/UL (ref 4.6–6.2)
SAMPLE: ABNORMAL
SITE: ABNORMAL
SODIUM SERPL-SCNC: 131 MMOL/L (ref 136–145)
SODIUM SERPL-SCNC: 133 MMOL/L (ref 136–145)
SODIUM SERPL-SCNC: 134 MMOL/L (ref 136–145)
SODIUM SERPL-SCNC: 135 MMOL/L (ref 136–145)
SP02: 100
WBC # BLD AUTO: 12.39 K/UL (ref 3.9–12.7)

## 2022-06-25 PROCEDURE — 83615 LACTATE (LD) (LDH) ENZYME: CPT | Mod: NTX | Performed by: PHYSICIAN ASSISTANT

## 2022-06-25 PROCEDURE — 99291 CRITICAL CARE FIRST HOUR: CPT | Mod: NTX,,, | Performed by: PHYSICIAN ASSISTANT

## 2022-06-25 PROCEDURE — C9399 UNCLASSIFIED DRUGS OR BIOLOG: HCPCS | Mod: NTX | Performed by: NURSE PRACTITIONER

## 2022-06-25 PROCEDURE — 99232 PR SUBSEQUENT HOSPITAL CARE,LEVL II: ICD-10-PCS | Mod: NTX,,, | Performed by: INTERNAL MEDICINE

## 2022-06-25 PROCEDURE — 27000202 HC IABP, ADD'L DAY: Mod: NTX

## 2022-06-25 PROCEDURE — 20000000 HC ICU ROOM: Mod: NTX

## 2022-06-25 PROCEDURE — 99292 CRITICAL CARE ADDL 30 MIN: CPT | Mod: NTX,,, | Performed by: INTERNAL MEDICINE

## 2022-06-25 PROCEDURE — 83050 HGB METHEMOGLOBIN QUAN: CPT | Mod: NTX

## 2022-06-25 PROCEDURE — 25000003 PHARM REV CODE 250: Mod: NTX | Performed by: NURSE PRACTITIONER

## 2022-06-25 PROCEDURE — 63600175 PHARM REV CODE 636 W HCPCS: Mod: NTX | Performed by: INTERNAL MEDICINE

## 2022-06-25 PROCEDURE — 63600175 PHARM REV CODE 636 W HCPCS: Mod: NTX | Performed by: STUDENT IN AN ORGANIZED HEALTH CARE EDUCATION/TRAINING PROGRAM

## 2022-06-25 PROCEDURE — 63600175 PHARM REV CODE 636 W HCPCS: Mod: NTX | Performed by: NURSE PRACTITIONER

## 2022-06-25 PROCEDURE — 99232 PR SUBSEQUENT HOSPITAL CARE,LEVL II: ICD-10-PCS | Mod: NTX,,, | Performed by: NURSE PRACTITIONER

## 2022-06-25 PROCEDURE — 82330 ASSAY OF CALCIUM: CPT | Mod: NTX | Performed by: PHYSICIAN ASSISTANT

## 2022-06-25 PROCEDURE — 82803 BLOOD GASES ANY COMBINATION: CPT | Mod: NTX

## 2022-06-25 PROCEDURE — 99900035 HC TECH TIME PER 15 MIN (STAT): Mod: NTX

## 2022-06-25 PROCEDURE — 99292 PR CRITICAL CARE, ADDL 30 MIN: ICD-10-PCS | Mod: NTX,,, | Performed by: INTERNAL MEDICINE

## 2022-06-25 PROCEDURE — 85025 COMPLETE CBC W/AUTO DIFF WBC: CPT | Mod: NTX | Performed by: INTERNAL MEDICINE

## 2022-06-25 PROCEDURE — 63600367 HC NITRIC OXIDE PER HOUR: Mod: NTX

## 2022-06-25 PROCEDURE — 25000003 PHARM REV CODE 250: Mod: NTX | Performed by: INTERNAL MEDICINE

## 2022-06-25 PROCEDURE — 25000003 PHARM REV CODE 250: Mod: NTX | Performed by: PHYSICIAN ASSISTANT

## 2022-06-25 PROCEDURE — 83735 ASSAY OF MAGNESIUM: CPT | Mod: NTX | Performed by: INTERNAL MEDICINE

## 2022-06-25 PROCEDURE — 80053 COMPREHEN METABOLIC PANEL: CPT | Mod: NTX | Performed by: NURSE PRACTITIONER

## 2022-06-25 PROCEDURE — 83605 ASSAY OF LACTIC ACID: CPT | Mod: NTX | Performed by: PHYSICIAN ASSISTANT

## 2022-06-25 PROCEDURE — 94761 N-INVAS EAR/PLS OXIMETRY MLT: CPT | Mod: NTX

## 2022-06-25 PROCEDURE — 99232 SBSQ HOSP IP/OBS MODERATE 35: CPT | Mod: NTX,,, | Performed by: NURSE PRACTITIONER

## 2022-06-25 PROCEDURE — 27000221 HC OXYGEN, UP TO 24 HOURS: Mod: NTX

## 2022-06-25 PROCEDURE — 85730 THROMBOPLASTIN TIME PARTIAL: CPT | Mod: NTX | Performed by: INTERNAL MEDICINE

## 2022-06-25 PROCEDURE — 99232 SBSQ HOSP IP/OBS MODERATE 35: CPT | Mod: NTX,,, | Performed by: INTERNAL MEDICINE

## 2022-06-25 PROCEDURE — 80048 BASIC METABOLIC PNL TOTAL CA: CPT | Mod: NTX,XB | Performed by: INTERNAL MEDICINE

## 2022-06-25 PROCEDURE — 84295 ASSAY OF SERUM SODIUM: CPT | Mod: NTX | Performed by: PHYSICIAN ASSISTANT

## 2022-06-25 PROCEDURE — 99291 PR CRITICAL CARE, E/M 30-74 MINUTES: ICD-10-PCS | Mod: NTX,,, | Performed by: PHYSICIAN ASSISTANT

## 2022-06-25 PROCEDURE — 87040 BLOOD CULTURE FOR BACTERIA: CPT | Mod: NTX | Performed by: INTERNAL MEDICINE

## 2022-06-25 RX ORDER — INSULIN ASPART 100 [IU]/ML
0-10 INJECTION, SOLUTION INTRAVENOUS; SUBCUTANEOUS
Status: DISCONTINUED | OUTPATIENT
Start: 2022-06-25 | End: 2022-06-29

## 2022-06-25 RX ORDER — IBUPROFEN 200 MG
24 TABLET ORAL
Status: DISCONTINUED | OUTPATIENT
Start: 2022-06-25 | End: 2022-06-29

## 2022-06-25 RX ORDER — VANCOMYCIN HCL IN 5 % DEXTROSE 1G/250ML
1000 PLASTIC BAG, INJECTION (ML) INTRAVENOUS
Status: DISCONTINUED | OUTPATIENT
Start: 2022-06-25 | End: 2022-06-28

## 2022-06-25 RX ORDER — INSULIN ASPART 100 [IU]/ML
6-12 INJECTION, SOLUTION INTRAVENOUS; SUBCUTANEOUS
Status: DISCONTINUED | OUTPATIENT
Start: 2022-06-25 | End: 2022-06-26

## 2022-06-25 RX ORDER — GLUCAGON 1 MG
1 KIT INJECTION
Status: DISCONTINUED | OUTPATIENT
Start: 2022-06-25 | End: 2022-06-29

## 2022-06-25 RX ORDER — IBUPROFEN 200 MG
16 TABLET ORAL
Status: DISCONTINUED | OUTPATIENT
Start: 2022-06-25 | End: 2022-06-29

## 2022-06-25 RX ADMIN — SUCRALFATE 1 G: 1 TABLET ORAL at 09:06

## 2022-06-25 RX ADMIN — INSULIN ASPART 12 UNITS: 100 INJECTION, SOLUTION INTRAVENOUS; SUBCUTANEOUS at 04:06

## 2022-06-25 RX ADMIN — ALPRAZOLAM 0.25 MG: 0.25 TABLET ORAL at 05:06

## 2022-06-25 RX ADMIN — INSULIN ASPART 6 UNITS: 100 INJECTION, SOLUTION INTRAVENOUS; SUBCUTANEOUS at 12:06

## 2022-06-25 RX ADMIN — INSULIN ASPART 2 UNITS: 100 INJECTION, SOLUTION INTRAVENOUS; SUBCUTANEOUS at 09:06

## 2022-06-25 RX ADMIN — METHOCARBAMOL TABLETS 500 MG: 500 TABLET, COATED ORAL at 12:06

## 2022-06-25 RX ADMIN — GABAPENTIN 300 MG: 300 CAPSULE ORAL at 03:06

## 2022-06-25 RX ADMIN — METHOCARBAMOL TABLETS 500 MG: 500 TABLET, COATED ORAL at 05:06

## 2022-06-25 RX ADMIN — BUSPIRONE HYDROCHLORIDE 7.5 MG: 5 TABLET ORAL at 08:06

## 2022-06-25 RX ADMIN — FUROSEMIDE 30 MG/HR: 10 INJECTION, SOLUTION INTRAMUSCULAR; INTRAVENOUS at 08:06

## 2022-06-25 RX ADMIN — GABAPENTIN 300 MG: 300 CAPSULE ORAL at 08:06

## 2022-06-25 RX ADMIN — VANCOMYCIN HYDROCHLORIDE 1000 MG: 1 INJECTION, POWDER, LYOPHILIZED, FOR SOLUTION INTRAVENOUS at 03:06

## 2022-06-25 RX ADMIN — MIRTAZAPINE 15 MG: 15 TABLET, FILM COATED ORAL at 09:06

## 2022-06-25 RX ADMIN — INSULIN ASPART 8 UNITS: 100 INJECTION, SOLUTION INTRAVENOUS; SUBCUTANEOUS at 08:06

## 2022-06-25 RX ADMIN — FUROSEMIDE 30 MG/HR: 10 INJECTION, SOLUTION INTRAMUSCULAR; INTRAVENOUS at 01:06

## 2022-06-25 RX ADMIN — GABAPENTIN 300 MG: 300 CAPSULE ORAL at 09:06

## 2022-06-25 RX ADMIN — POTASSIUM CHLORIDE 30 MEQ: 10 CAPSULE, COATED, EXTENDED RELEASE ORAL at 08:06

## 2022-06-25 RX ADMIN — BUSPIRONE HYDROCHLORIDE 7.5 MG: 5 TABLET ORAL at 09:06

## 2022-06-25 RX ADMIN — HYDROCODONE BITARTRATE AND ACETAMINOPHEN 1 TABLET: 5; 325 TABLET ORAL at 02:06

## 2022-06-25 RX ADMIN — ACETAMINOPHEN 650 MG: 325 TABLET ORAL at 09:06

## 2022-06-25 RX ADMIN — HYDROCODONE BITARTRATE AND ACETAMINOPHEN 1 TABLET: 5; 325 TABLET ORAL at 09:06

## 2022-06-25 RX ADMIN — INSULIN ASPART 10 UNITS: 100 INJECTION, SOLUTION INTRAVENOUS; SUBCUTANEOUS at 08:06

## 2022-06-25 RX ADMIN — VANCOMYCIN HYDROCHLORIDE 1000 MG: 1 INJECTION, POWDER, LYOPHILIZED, FOR SOLUTION INTRAVENOUS at 09:06

## 2022-06-25 RX ADMIN — ASPIRIN 81 MG CHEWABLE TABLET 81 MG: 81 TABLET CHEWABLE at 08:06

## 2022-06-25 RX ADMIN — METHOCARBAMOL TABLETS 500 MG: 500 TABLET, COATED ORAL at 08:06

## 2022-06-25 RX ADMIN — PANTOPRAZOLE SODIUM 40 MG: 40 TABLET, DELAYED RELEASE ORAL at 08:06

## 2022-06-25 RX ADMIN — INSULIN DETEMIR 24 UNITS: 100 INJECTION, SOLUTION SUBCUTANEOUS at 08:06

## 2022-06-25 RX ADMIN — ACETAMINOPHEN 650 MG: 325 TABLET ORAL at 03:06

## 2022-06-25 RX ADMIN — METHOCARBAMOL TABLETS 500 MG: 500 TABLET, COATED ORAL at 09:06

## 2022-06-25 RX ADMIN — MILRINONE LACTATE IN DEXTROSE 0.38 MCG/KG/MIN: 200 INJECTION, SOLUTION INTRAVENOUS at 08:06

## 2022-06-25 RX ADMIN — HEPARIN SODIUM 24 UNITS/KG/HR: 5000 INJECTION INTRAVENOUS; SUBCUTANEOUS at 11:06

## 2022-06-25 RX ADMIN — MILRINONE LACTATE IN DEXTROSE 0.38 MCG/KG/MIN: 200 INJECTION, SOLUTION INTRAVENOUS at 12:06

## 2022-06-25 RX ADMIN — POTASSIUM CHLORIDE 30 MEQ: 10 CAPSULE, COATED, EXTENDED RELEASE ORAL at 09:06

## 2022-06-25 RX ADMIN — INSULIN HUMAN 1.6 UNITS/HR: 1 INJECTION, SOLUTION INTRAVENOUS at 12:06

## 2022-06-25 NOTE — ASSESSMENT & PLAN NOTE
-Blood cultures 6/20 and repeat 6/21 positive for Staph Epi. One of two blood cultures from 6/23 positive for Staph. Repeat cultures sent today.   -IJ exchanged on 6/23, IABP exchanged 6/23  -Continue Empiric Vanc 6/22  -ID consulted given upcoming planned LVAD, continue Vanc, recommend GUILLERMO if cultures remain positive

## 2022-06-25 NOTE — PROGRESS NOTES
"[facility] - Cardiac Intensive Care  Endocrinology  Progress Note    Admit Date: 2022     Reason for Consult: Management of  type 2 DM, Hyperglycemia     Surgical Procedure and Date: none    Diabetes diagnosis year: 21    Home Diabetes Medications:  Detemir  23  units daily and Aspart   12  units TIDWM and  Mod dose correction insulin    Lab Results   Component Value Date    HGBA1C 9.2 (H) 2022           How often checking glucose at home? 1-3 x day   BG readings on regimen: 180- up to 300 once  Hypoglycemia on the regimen?  yes once- related to not really eating after taking aspart   Missed doses on regimen?  no    Diabetes Complications include:     Hyperglycemia    Complicating diabetes co morbidities:   History of MI, CHF, and CKD      HPI:   Patient is a 37 y.o. male with a diagnosis of type 2 DM and NIDCM with BiV systolic heart failure. [facility]  and was  approved for [facility]. Also recently admitted with [facility]; he had clinic appointment last week to f/u recent admit for hyperglycemic hyperosmolar syndrome but did not come as he was "feeling too bad" presents to  ED with SOB. Endocrinology consulted for BG/ DM management.                Interval HPI:   Overnight events: No acute events overnight. Patient on the CICU in room [id number] A. Blood glucose worsening. BG above goal on current insulin regimen (SSI, prandial, and basal insulin ). Steroid use- None. 2 Days Post-Op  Renal function- Abnormal - Creatinine 1.8   Vasopressors-  None       Diet diabetic [facility]; 2800 Calorie; Cardiac (Low Na/Chol); Isolation Tray - Regular [place]     Eatin%  Nausea: No  Hypoglycemia and intervention: No  Fever: No  TPN and/or TF: No      /78 (BP Location: Right arm, Patient Position: Sitting)   Pulse 106   Temp 97.9 °F (36.6 °C) (Oral)   Resp (!) 8   Ht 6' 3" (1.905 m)   Wt 95.3 kg (210 lb 1.6 oz)   SpO2 100%   BMI 26.26 kg/m²     Labs Reviewed and " Include    Recent Labs   Lab 06/25/22  0248 06/25/22  0615   *  --    CALCIUM 10.4  --    ALBUMIN 3.6  --    PROT 8.6*  --    * 131*   K 4.0  --    CO2 31*  --    CL 91*  --    BUN 35*  --    CREATININE 1.8*  --    ALKPHOS 156*  --    ALT 30  --    AST 34  --    BILITOT 1.0  --      Lab Results   Component Value Date    WBC 12.39 06/25/2022    HGB 10.5 (L) 06/25/2022    HCT 32.5 (L) 06/25/2022    MCV 89 06/25/2022     06/25/2022     No results for input(s): TSH, FREET4 in the last 168 hours.  Lab Results   Component Value Date    HGBA1C 9.2 (H) 05/20/2022       Nutritional status:   Body mass index is 26.26 kg/m².  Lab Results   Component Value Date    ALBUMIN 3.6 06/25/2022    ALBUMIN 3.4 (L) 06/24/2022    ALBUMIN 3.4 (L) 06/23/2022     Lab Results   Component Value Date    PREALBUMIN 36 04/18/2022       Estimated Creatinine Clearance: 67.2 mL/min (A) (based on SCr of 1.8 mg/dL (H)).    Accu-Checks  Recent Labs     06/23/22  0523 06/23/22  0842 06/23/22  1843 06/23/22  2319 06/24/22  0709 06/24/22  1145 06/24/22  1655 06/24/22  2201 06/25/22  0752 06/25/22  0753   POCTGLUCOSE 336* 273* 347* 357* 345* 397* 341* 230* 408* 459*       Current Medications and/or Treatments Impacting Glycemic Control  Immunotherapy:    Immunosuppressants       None          Steroids:   Hormones (From admission, onward)                None          Pressors:    Autonomic Drugs (From admission, onward)                Start     Stop Route Frequency Ordered    06/13/22 2130  EPINEPHrine (ADRENALIN) 5 mg in dextrose 5 % 250 mL infusion         -- IV Continuous 06/13/22 2024          Hyperglycemia/Diabetes Medications:   Antihyperglycemics (From admission, onward)                Start     Stop Route Frequency Ordered    06/25/22 1130  insulin regular in 0.9 % NaCl 100 unit/100 mL (1 unit/mL) infusion        Question:  Enter initial dose (Units/hr):  Answer:  1.6    -- IV Continuous 06/25/22 1028    06/25/22 1130  insulin  aspart U-100 pen 6-12 Units         -- SubQ 3 times daily with meals 06/25/22 1028    06/25/22 1127  insulin aspart U-100 pen 0-10 Units         -- SubQ As needed (PRN) 06/25/22 1028            ASSESSMENT and PLAN    * Acute on chronic combined systolic and diastolic heart failure, NYHA class 4  Optimize glucose control and  avoid hypoglycemia    Managed per primary.       Uncontrolled diabetes mellitus  Endocrinology consulted for BG management.   BG goal 140-180    - D/C Levemir  - Transition drip at 1.6 units/hr with step-down parameters (Weight-based at 0.8 units/kg)  - Novolog 12 units TIDWM (basal/prandial matching)  - Novolog (aspart) insulin prn for BG excursions MDC SSI (150/25).   - BG checks q4hr  - Hypoglycemia protocol in place  - If blood glucose greater than 300, please ask patient not to eat food or drink anything other than water until correctional insulin has brought it back below 250    - Will likely require IIP s/p LVAD    - Provided bedside diabetes education.    ** Please notify Endocrine for any change and/or advance in diet**  ** Please call Endocrine for any BG related issues **    Discharge Planning:   TBD. Please notify endocrinology prior to discharge.        CKD (chronic kidney disease) stage 3, GFR 30-59 ml/min    Titrate insulin slowly to avoid hypoglycemia as the risk of hypoglycemia increases with decreased creatinine clearance.    Estimated Creatinine Clearance: 67.2 mL/min (A) (based on SCr of 1.8 mg/dL (H)).           Michael Wyman, DNP, FNP  Endocrinology  [facility] - [facility]

## 2022-06-25 NOTE — PROGRESS NOTES
[facility] - Cardiac Intensive Care  Heart Transplant  Progress Note    Patient Name: Kevan Queen  MRN: [id number]  Admission Date: 6/13/2022  Hospital Length of Stay: 12 days  Attending Physician: Luca Lopez Jr.,*  Primary Care Provider: ORALIA Cline  Principal Problem:Acute on chronic combined systolic and diastolic heart failure, NYHA class 4    Subjective:     Interval History: No complaints. One of two blood cultures 6/23 +ve Staph epi, (taken prior to line and IABP exchange), repeat blood cultures sent today. ID following, recommend continuing IV Vanc and GUILLERMO if cultures remain positive. CVP 11, SVO2 43%, CO 4.2, CI 1.9, SVR 1700 with IABP 1:1, epi 0.04, milrinone 0.375, Marcella 10 ppm, and IV Lasix gtt at 30 mg/hr. Na improved to 131, will hold off on redosing tolvaptan. Endocrinology to adjust insulin orders with persistent hyperglycemia- appreciate their assistance.     Lines:  IABP 6/23  Right IJ 6/23    Continuous Infusions:   sodium chloride 0.9% Stopped (06/23/22 0358)    EPINEPHrine 0.04 mcg/kg/min (06/25/22 1000)    furosemide (LASIX) 10 mg/mL infusion (non-titrating) 30 mg/hr (06/25/22 1000)    heparin (porcine) in D5W 24 Units/kg/hr (06/25/22 1000)    insulin regular 1 units/mL infusion orderable (TRANSFER)      milrinone 20mg/100ml D5W (200mcg/ml) 0.375 mcg/kg/min (06/25/22 1000)    nitric oxide gas       Scheduled Meds:   acetaminophen  650 mg Oral Q8H    aspirin  81 mg Oral Daily    busPIRone  7.5 mg Oral Q12H    gabapentin  300 mg Oral TID    HYDROcodone-acetaminophen  1 tablet Oral Q8H    insulin aspart U-100  6-12 Units Subcutaneous TIDWM    LIDOcaine HCL 20 mg/ml (2%)  20 mL Other Once    methocarbamoL  500 mg Oral QID    mirtazapine  15 mg Oral Nightly    pantoprazole  40 mg Oral Daily    polyethylene glycol  17 g Oral BID    potassium chloride  30 mEq Oral BID    senna-docusate 8.6-50 mg  1 tablet Oral Daily    sucralfate  1 g Oral Nightly    vancomycin  (VANCOCIN) IVPB  1,000 mg Intravenous Q12H     PRN Meds:sodium chloride, sodium chloride, sodium chloride, ALPRAZolam, bisacodyL, dextrose 10%, dextrose 10%, diphenhydrAMINE, glucagon (human recombinant), glucose, glucose, insulin aspart U-100, magnesium oxide, magnesium oxide, mupirocin, potassium bicarbonate, potassium bicarbonate, potassium bicarbonate, potassium, sodium phosphates, potassium, sodium phosphates, potassium, sodium phosphates, promethazine, sodium chloride 0.9%, sodium chloride 0.9%, Pharmacy to dose Vancomycin consult **AND** vancomycin - pharmacy to dose    Review of patient's allergies indicates:   Allergen Reactions    Aspirin Other (See Comments)     Mr. Thacker is enrolled in Dr. Paige's Alon Trial and cannot have any aspirin and/or aspirin-containing products. DO NOT cancel any orders for INV Aspirin 100 mg/Placebo. If you have questions, please contact Isabel @ [phone], [phone],[email or web address], secure chat or MS Teams message.    Bumex [bumetanide] Hives    Lactose Diarrhea     Other reaction(s): Abdominal distension, gaseous    Torsemide Hives     Objective:     Vital Signs (Most Recent):  Temp: 97.9 °F (36.6 °C) (06/25/22 0701)  Pulse: 107 (06/25/22 1000)  Resp: (!) 41 (06/25/22 1000)  BP: (!) 133/96 (06/25/22 1000)  SpO2: 99 % (06/25/22 1000)   Vital Signs (24h Range):  Temp:  [97.9 °F (36.6 °C)-98.4 °F (36.9 °C)] 97.9 °F (36.6 °C)  Pulse:  [100-123] 107  Resp:  [8-41] 41  SpO2:  [82 %-100 %] 99 %  BP: (112-141)/(63-96) 133/96     Patient Vitals for the past 72 hrs (Last 3 readings):   Weight   06/25/22 0300 95.3 kg (210 lb 1.6 oz)       Body mass index is 26.26 kg/m².      Intake/Output Summary (Last 24 hours) at 6/25/2022 1050  Last data filed at 6/25/2022 1000  Gross per 24 hour   Intake 3327.61 ml   Output 8525 ml   Net -5197.39 ml         Hemodynamic Parameters:       Telemetry: ST    Physical Exam  Constitutional:       Appearance: Normal appearance.   HENT:       Head: Normocephalic and atraumatic.   Eyes:      Conjunctiva/sclera: Conjunctivae normal.      Pupils: Pupils are equal, round, and reactive to light.   Neck:      Comments: RIJ TLC  Cardiovascular:      Rate and Rhythm: Regular rhythm. Tachycardia present.      Comments: +S3  Pulmonary:      Effort: Pulmonary effort is normal.      Breath sounds: Normal breath sounds.   Abdominal:      General: Bowel sounds are normal. There is distension.   Musculoskeletal:         General: No swelling. Normal range of motion.      Cervical back: Neck supple.   Skin:     General: Skin is warm and dry.      Capillary Refill: Capillary refill takes 2 to 3 seconds.   Neurological:      General: No focal deficit present.      Mental Status: He is alert and oriented to person, place, and time.   Psychiatric:         Mood and Affect: Mood normal.         Behavior: Behavior normal.         Thought Content: Thought content normal.         Judgment: Judgment normal.       Significant Labs:  CBC:  Recent Labs   Lab 06/23/22  0246 06/24/22  0330 06/25/22  0248   WBC 11.31 10.24 12.39   RBC 3.37* 3.27* 3.66*   HGB 9.9* 9.6* 10.5*   HCT 29.7* 29.5* 32.5*    411 445   MCV 88 90 89   MCH 29.4 29.4 28.7   MCHC 33.3 32.5 32.3       BNP:  No results for input(s): BNP in the last 168 hours.    Invalid input(s): BNPTRIAGELBLO    CMP:  Recent Labs   Lab 06/23/22  0246 06/23/22  1519 06/24/22  0330 06/24/22  1150 06/24/22  1518 06/24/22  1703 06/24/22  2329 06/25/22  0248 06/25/22  0615   *   < > 349*  --  241*  --   --  263*  --    CALCIUM 9.5   < > 9.2  --  10.2  --   --  10.4  --    ALBUMIN 3.4*  --  3.4*  --   --   --   --  3.6  --    PROT 7.9  --  7.4  --   --   --   --  8.6*  --    *   < > 129*   < > 132*   < > 133* 134* 131*   K 3.8   < > 4.1  --  3.7  --   --  4.0  --    CO2 31*   < > 30*  --  30*  --   --  31*  --    CL 87*   < > 87*  --  89*  --   --  91*  --    BUN 39*   < > 38*  --  30*  --   --  35*  --    CREATININE  1.7*   < > 1.9*  --  1.6*  --   --  1.8*  --    ALKPHOS 117  --  131  --   --   --   --  156*  --    ALT 21  --  19  --   --   --   --  30  --    AST 23  --  22  --   --   --   --  34  --    BILITOT 1.0  --  1.0  --   --   --   --  1.0  --     < > = values in this interval not displayed.        Coagulation:   Recent Labs   Lab 06/20/22  1607 06/21/22  0418 06/23/22  0246 06/24/22  0330 06/25/22  0248   INR 1.0  --   --   --   --    APTT  --    < > 49.0* 40.2* 44.7*    < > = values in this interval not displayed.       LDH:  Recent Labs   Lab 06/23/22  0246 06/24/22  1150 06/25/22  0248   * 395* 379*       Microbiology:  Microbiology Results (last 7 days)       Procedure Component Value Units Date/Time    Blood culture [[id number]]  (Abnormal)  (Susceptibility) Collected: 06/21/22 2250    Order Status: Completed Specimen: Blood from Peripheral, Wrist, Left Updated: 06/25/22 1029     Blood Culture, Routine Gram stain dudley bottle: Gram positive cocci      Results called to and read back by: Arnulfo Herrera RN. 06/22/2022  17:46      Gram stain aer bottle: Gram positive cocci in clusters resembling Staph       Positive results previously called 06/23/2022  00:11      STAPHYLOCOCCUS EPIDERMIDIS    Blood culture [[id number]]  (Abnormal)  (Susceptibility) Collected: 06/20/22 1651    Order Status: Completed Specimen: Blood from Peripheral, Hand, Left Updated: 06/25/22 1023     Blood Culture, Routine Gram stain dudley bottle: Gram positive cocci in clusters resembling Staph      Results called to and read back by:Tara López RN 06/21/2022  22:16      STAPHYLOCOCCUS EPIDERMIDIS    Blood culture [[id number]]  (Abnormal) Collected: 06/23/22 0108    Order Status: Completed Specimen: Blood from Peripheral, Hand, Right Updated: 06/25/22 0918     Blood Culture, Routine Gram stain dudley bottle: Gram positive cocci in clusters resembling Staph       Results called to and read back by: Mark Pickett RN  06/24/2022        14:36       STAPHYLOCOCCUS SPECIES  Identification and susceptibility pending      Blood culture [[id number]] Collected: 06/25/22 0633    Order Status: Sent Specimen: Blood from Peripheral, Antecubital, Right Updated: 06/25/22 0644    Blood culture [[id number]] Collected: 06/25/22 0634    Order Status: Sent Specimen: Blood from Peripheral, Wrist, Left Updated: 06/25/22 0644    Blood culture [[id number]] Collected: 06/23/22 0105    Order Status: Completed Specimen: Blood from Peripheral, Antecubital, Right Updated: 06/25/22 0612     Blood Culture, Routine No Growth to date      No Growth to date      No Growth to date    Blood culture [[id number]]  (Abnormal) Collected: 06/21/22 2250    Order Status: Completed Specimen: Blood from Peripheral, Wrist, Left Updated: 06/24/22 1440     Blood Culture, Routine Gram stain dudley bottle: Gram positive cocci in clusters resembling Staph      Results called to and read back by: Rosa Pardo RN. 06/22/2022  23:29      Gram stain aer bottle: Gram positive cocci in clusters resembling Staph       Positive results previously called 06/23/2022  04:33      STAPHYLOCOCCUS EPIDERMIDIS  Susceptibility pending      Blood culture [[id number]]     Order Status: Canceled Specimen: Blood             I have reviewed all pertinent labs within the past 24 hours.    Estimated Creatinine Clearance: 67.2 mL/min (A) (based on SCr of 1.8 mg/dL (H)).    Diagnostic Results:  I have reviewed and interpreted all pertinent imaging results/findings within the past 24 hours.    Assessment and Plan:     38 yo male with NIDCM with BiV systolic heart failure, on home Milrinone at 0.375 mcg/kg/min, presented at [facility] 4/2/22 ,was not evaluated for OHT as he has recently quit smoking in April 2022 but was approved for VAD with plan to begin OHT evaluation in upcoming months if Mr Queen is stable and suitable for OHT eval (blood group A), issues with frozen shoulder following ICD implant in the past, had clinic appointment  "last week to f/u recent admit for hyperglycemic hyperosmolar syndrome but did not come as he was "feeling too bad" presents to our ED with SOB at rest for 1 week, 6# weight gain (reports dry weight is 217#), inability to sleep past 3 nights 2/2 SOB (says he sleep on his side). Went to ED at [facility] 6/10 but left after waiting 4 hours. Had clinic appointment with us today, but arrived to [facility] early this morning and decided to go to the ED instead. Baseline Lasix dose is 80 mg bid. Reports taking 240 mg qd past 3 days with no improvement. BNP is 1701, up from 898 on 6/2 and 49 on 5/24. sCr is 1.8 with baseline ~ 1.5-1.7. sPO2 on RA is 93%. Wife at bedside    He has been given Lasix 80 mg IVP in the ED with plan to start Lasix gtt at 20 mg/hr           * Acute on chronic combined systolic and diastolic heart failure, NYHA class 4  - [facility]  - Approved for LVAD at [facility] on 4/2/22. Was not evaluated for OHTx as he quit smoking in April 2022  - Moved to ICU 6/13 in the setting of significant volume overloaded and low output state. IABP placed 6/13 and Epi and Marcella added for RV support.   - IABP exchanged 6/23  - Lasix gtt D/C'd 6/17 secondary to increase in sCr. Given IV NS and sCr improved  - Lasix drip restarted 6/23 at 40 mg/hr and now at 30mg/hr. Given tolvaptan yesterday with hyponatremia, which has since improved   - IABP 1:1, IV milrinone 0.375, epi 0.04, and Marcella 10 ppm  - CVP: 11, SVO 43, CO: 4.2, CI: 1.9, SVR: 1700  - TTE 6/21: LVEF 15%, LVEDD: 7.14, TAPSE: 1.29 severe MR, mod TR. Mild RVE with mod reduced RV failure   - RHC done 4/19/22 on Milrinone 0.5 mcg/kg/min: RA 11, PA 50/27 (35), W 27, CO/CI 3.7/1.7, PVR 2.2 and SVR 1535   - GDMT: Entresto and Aldactone on hold since admit 5/24-5/27 2/2 elevated sCr  - Reports dry weight is 217#, and that he had gained 6# on his home scale PTA  - Tentative VAD implant 6/29. Unlikely OHTx candidate with smoking history (quit April 2022)     Staphylococcus " epidermidis bacteremia  -Blood cultures 6/20 and repeat 6/21 positive for Staph Epi. One of two blood cultures from 6/23 positive for Staph. Repeat cultures sent today.   -IJ exchanged on 6/23, IABP exchanged 6/23  -Continue Empiric Vanc 6/22  -ID consulted given upcoming planned LVAD, continue Vanc, recommend GUILLERMO if cultures remain positive    Muscle cramping  -decreased scheduled Percocet and starting Gabapentin    Uncontrolled diabetes mellitus  -Admitted 5/24-5/27 with hyperglycemia hyperosmolar syndrome  -Hgb A1C 9.2 on 5/20/22  -Endocrine consulted, plan to adjust insulin orders and provide some DM education     CKD (chronic kidney disease) stage 3, GFR 30-59 ml/min  - Admit sCr 1.8, baseline ~ 1.5-1.7  - Creatinine elevated to 2.0 but improved after fluid.    - Creatinine has been stable, but increased to 1.9 today   - Will monitor response    Cardiogenic shock  -See A/C CHF      Uninterrupted Critical Care/Counseling Time (not including procedures): 65 minutes      Denise Espinoza PA-C  Heart Transplant  [facility] - Cardiac Intensive Care

## 2022-06-25 NOTE — PLAN OF CARE
CICU DAILY GOALS       A: Awake    RASS: Goal - RASS Goal: 0-->alert and calm  Actual - RASS (Diggs Agitation-Sedation Scale): 0-->alert and calm   Restraint necessity:    B: Breath   SBT: Not intubated   C: Coordinate A & B, analgesics/sedatives   Pain: managed    SAT: Not intubated  D: Delirium   CAM-ICU: Overall CAM-ICU: Negative  E: Early(intubated/ Progressive (non-intubated) Mobility   MOVE Screen: Fail   Activity: Activity Management: Ankle pumps - L1, Arm raise - L1, Rolling - L1  FAS: Feeding/Nutrition   Diet order: Diet/Nutrition Received: low saturated fat/low cholesterol, 2 gram sodium,   Fluid restriction: Fluid Requirement: 1500  T: Thrombus   DVT prophylaxis: VTE Required Core Measure: Pharmacological prophylaxis initiated/maintained  H: HOB Elevation   Head of Bed (HOB) Positioning: HOB at 15 degrees  U: Ulcer Prophylaxis   GI: yes  G: Glucose control   managed Glycemic Management: blood glucose monitored  S: Skin   Bundle compliance: yes   Bathing/Skin Care: back care, bath, complete, dressed/undressed, linen changed Date: PM bath  B: Bowel Function   no issues   I: Indwelling Catheters   Cartwright necessity:     CVC necessity: Yes   IPAD offered: No  D: De-escalation Antibx   No  Plan for the day   Monitor CVPs (13, 10, 13), monitor SVO2 (43), monitor IABP site, IABP remains on 1:1, pulses palpable  Family/Goals of care/Code Status   Code Status: Full Code     No acute events throughout day, VS and assessment per flow sheet, patient progressing towards goals as tolerated, plan of care reviewed with Kevan Queen and family, all concerns addressed, will continue to monitor       Problem: Adult Inpatient Plan of Care  Goal: Plan of Care Review  Outcome: Ongoing, Progressing  Goal: Absence of Hospital-Acquired Illness or Injury  Outcome: Ongoing, Progressing     Problem: Diabetes Comorbidity  Goal: Blood Glucose Level Within Targeted Range  Outcome: Ongoing, Not Progressing

## 2022-06-25 NOTE — SUBJECTIVE & OBJECTIVE
"Interval HPI:   Overnight events: No acute events overnight. Patient on the CICU in room [id number] A. Blood glucose worsening. BG above goal on current insulin regimen (SSI, prandial, and basal insulin ). Steroid use- None. 2 Days Post-Op  Renal function- Abnormal - Creatinine 1.8   Vasopressors-  None       Diet diabetic [facility]; 2800 Calorie; Cardiac (Low Na/Chol); Isolation Tray - Regular [place]     Eatin%  Nausea: No  Hypoglycemia and intervention: No  Fever: No  TPN and/or TF: No      /78 (BP Location: Right arm, Patient Position: Sitting)   Pulse 106   Temp 97.9 °F (36.6 °C) (Oral)   Resp (!) 8   Ht 6' 3" (1.905 m)   Wt 95.3 kg (210 lb 1.6 oz)   SpO2 100%   BMI 26.26 kg/m²     Labs Reviewed and Include    Recent Labs   Lab 228 22   *  --    CALCIUM 10.4  --    ALBUMIN 3.6  --    PROT 8.6*  --    * 131*   K 4.0  --    CO2 31*  --    CL 91*  --    BUN 35*  --    CREATININE 1.8*  --    ALKPHOS 156*  --    ALT 30  --    AST 34  --    BILITOT 1.0  --      Lab Results   Component Value Date    WBC 12.39 2022    HGB 10.5 (L) 2022    HCT 32.5 (L) 2022    MCV 89 2022     2022     No results for input(s): TSH, FREET4 in the last 168 hours.  Lab Results   Component Value Date    HGBA1C 9.2 (H) 2022       Nutritional status:   Body mass index is 26.26 kg/m².  Lab Results   Component Value Date    ALBUMIN 3.6 2022    ALBUMIN 3.4 (L) 2022    ALBUMIN 3.4 (L) 2022     Lab Results   Component Value Date    PREALBUMIN 36 2022       Estimated Creatinine Clearance: 67.2 mL/min (A) (based on SCr of 1.8 mg/dL (H)).    Accu-Checks  Recent Labs     22  0523 22  0842 22  1843 22  2319 22  0709 22  1145 22  1655 22  2201 22  0752 22  0753   POCTGLUCOSE 336* 273* 347* 357* 345* 397* 341* 230* 408* 459*       Current Medications and/or " Treatments Impacting Glycemic Control  Immunotherapy:    Immunosuppressants       None          Steroids:   Hormones (From admission, onward)                None          Pressors:    Autonomic Drugs (From admission, onward)                Start     Stop Route Frequency Ordered    06/13/22 2130  EPINEPHrine (ADRENALIN) 5 mg in dextrose 5 % 250 mL infusion         -- IV Continuous 06/13/22 2024          Hyperglycemia/Diabetes Medications:   Antihyperglycemics (From admission, onward)                Start     Stop Route Frequency Ordered    06/25/22 1130  insulin regular in 0.9 % NaCl 100 unit/100 mL (1 unit/mL) infusion        Question:  Enter initial dose (Units/hr):  Answer:  1.6    -- IV Continuous 06/25/22 1028    06/25/22 1130  insulin aspart U-100 pen 6-12 Units         -- SubQ 3 times daily with meals 06/25/22 1028    06/25/22 1127  insulin aspart U-100 pen 0-10 Units         -- SubQ As needed (PRN) 06/25/22 1028

## 2022-06-25 NOTE — ASSESSMENT & PLAN NOTE
- [facility]  - Approved for LVAD at [facility] on 4/2/22. Was not evaluated for OHTx as he quit smoking in April 2022  - Moved to ICU 6/13 in the setting of significant volume overloaded and low output state. IABP placed 6/13 and Epi and Marcella added for RV support.   - IABP exchanged 6/23  - Lasix gtt D/C'd 6/17 secondary to increase in sCr. Given IV NS and sCr improved  - Lasix drip restarted 6/23 at 40 mg/hr and now at 30mg/hr. Given tolvaptan yesterday with hyponatremia, which has since improved   - IABP 1:1, IV milrinone 0.375, epi 0.04, and Marcella 10 ppm  - CVP: 11, SVO 43, CO: 4.2, CI: 1.9, SVR: 1700  - TTE 6/21: LVEF 15%, LVEDD: 7.14, TAPSE: 1.29 severe MR, mod TR. Mild RVE with mod reduced RV failure   - RHC done 4/19/22 on Milrinone 0.5 mcg/kg/min: RA 11, PA 50/27 (35), W 27, CO/CI 3.7/1.7, PVR 2.2 and SVR 1535   - GDMT: Entresto and Aldactone on hold since admit 5/24-5/27 2/2 elevated sCr  - Reports dry weight is 217#, and that he had gained 6# on his home scale PTA  - Tentative VAD implant 6/29. Unlikely OHTx candidate with smoking history (quit April 2022)

## 2022-06-25 NOTE — PROGRESS NOTES
"[facility] - [facility]  Infectious Disease  Progress Note    Patient Name: Kevan Queen  MRN: [id number]  Admission Date: 6/13/2022  Length of Stay: 12 days  Attending Physician: Luca Lopez Jr.,*  Primary Care Provider: ORALIA Cline    Isolation Status: No active isolations  Assessment/Plan:      Staphylococcus epidermidis bacteremia  A 37-year-old male with history of NIDCM with BiV systolic heart failure on home milrinone presented 6/13/2022 for ADHF, IABP placed 6/13/2022, now with Staph epi bacteremia, vancomycin initiated 6/22/2022. IABP and IJ line exchanged on 6/23/2022. Repeat blood cultures from 6/23/2022 positive - but this was prior to IABL and LIJ exchange.    Afebrile and without leukocytosis. Repeat blood cultures 6/25 remain NGTD.    Recommendations:  - Continue vancomycin IV, goal 15-20  - Will follow up cultures from 6/25.  - If blood cultures 6/25 become positive then consider GUILLERMO to evaluate for ICD endocarditis  - Hold off on LVAD implantation at this time      Anticipated Disposition: per primary    Thank you for your consult. I will follow-up with patient. Please contact us if you have any additional questions.    Melly Horta MD  Infectious Disease  [facility]    Subjective:     Principal Problem:Acute on chronic combined systolic and diastolic heart failure, NYHA class 4    HPI: 37-year-old male with history of NIDCM with BiV systolic heart failure on home milrinone presented 6/13/2022 for ADHF. Patient not current candidate for OHT as recently quit smoking 4/2022. IABP placed 6/13/2022, central line exchanged 6/20/2022. Blood culture x1 drawn on 6/20/2022, returned with GPC. Repeat blood cultures drawn 6/21/2022 x2. Patient reports tenderness around right femoral IABP - started once IABP was placed. Denied any worsening pain around right femoral line.    Interval History: "OK". No acute events overnight. Cultures 6/25 remain " NGTD.    Review of Systems   Constitutional:  Negative for chills, fatigue and fever.   HENT:  Negative for ear pain, mouth sores, nosebleeds, postnasal drip, rhinorrhea, sinus pressure, sore throat, tinnitus, trouble swallowing and voice change.    Eyes:  Negative for photophobia, pain, redness and visual disturbance.   Respiratory:  Negative for apnea, cough, chest tightness, shortness of breath and wheezing.    Cardiovascular:  Negative for chest pain, palpitations and leg swelling.   Gastrointestinal:  Negative for abdominal pain, blood in stool, constipation, diarrhea, nausea and vomiting.   Endocrine: Negative for cold intolerance, heat intolerance, polydipsia and polyuria.   Genitourinary:  Negative for decreased urine volume, difficulty urinating, dysuria, flank pain, frequency, genital sores, hematuria, penile discharge, penile pain, penile swelling, scrotal swelling, testicular pain and urgency.   Musculoskeletal:  Negative for arthralgias, back pain, joint swelling, myalgias and neck pain.   Skin:  Negative for color change and rash.   Allergic/Immunologic: Negative for environmental allergies and food allergies.   Neurological:  Negative for dizziness, seizures, syncope, weakness, light-headedness, numbness and headaches.   Hematological:  Negative for adenopathy. Does not bruise/bleed easily.   Psychiatric/Behavioral:  Negative for agitation, confusion, decreased concentration, hallucinations, self-injury, sleep disturbance and suicidal ideas. The patient is not nervous/anxious.    Objective:     Vital Signs (Most Recent):  Temp: 98 °F (36.7 °C) (06/25/22 1500)  Pulse: (!) 117 (06/25/22 1950)  Resp: (!) 45 (06/25/22 1950)  BP: 131/68 (06/25/22 1800)  SpO2: 100 % (06/25/22 1950)   Vital Signs (24h Range):  Temp:  [97.9 °F (36.6 °C)-98.4 °F (36.9 °C)] 98 °F (36.7 °C)  Pulse:  [100-117] 117  Resp:  [8-45] 45  SpO2:  [82 %-100 %] 100 %  BP: (102-141)/(68-96) 131/68     Weight: 95.3 kg (210 lb 1.6 oz)  Body  mass index is 26.26 kg/m².    Estimated Creatinine Clearance: 67.2 mL/min (A) (based on SCr of 1.8 mg/dL (H)).    Physical Exam  Vitals reviewed.   Constitutional:       Appearance: He is well-developed.   HENT:      Head: Normocephalic and atraumatic.      Right Ear: External ear normal.      Left Ear: External ear normal.   Eyes:      Conjunctiva/sclera: Conjunctivae normal.   Neck:      Thyroid: No thyromegaly.   Cardiovascular:      Rate and Rhythm: Normal rate and regular rhythm.      Heart sounds: Normal heart sounds. No murmur heard.     Comments: Balloon pump sounds.   Pulmonary:      Effort: Pulmonary effort is normal.      Breath sounds: Normal breath sounds. No wheezing or rales.   Abdominal:      General: Bowel sounds are normal.      Palpations: Abdomen is soft. There is no mass.      Tenderness: There is no abdominal tenderness. There is no rebound.   Musculoskeletal:         General: Normal range of motion.      Cervical back: Normal range of motion and neck supple.   Lymphadenopathy:      Cervical: No cervical adenopathy.   Skin:     General: Skin is warm and dry.   Neurological:      Mental Status: He is alert and oriented to person, place, and time.   Psychiatric:         Behavior: Behavior normal.       Significant Labs: Blood Culture:   Recent Labs   Lab 06/21/22  2250 06/23/22  0105 06/23/22  0108 06/25/22  0633 06/25/22  0634   LABBLOO Gram stain dudley bottle: Gram positive cocci in clusters resembling Staph  Results called to and read back by: Rosa Pardo RN. 06/22/2022  23:29  Gram stain aer bottle: Gram positive cocci in clusters resembling Staph   Positive results previously called 06/23/2022  04:33  STAPHYLOCOCCUS EPIDERMIDIS  Susceptibility pending  *  Gram stain dudley bottle: Gram positive cocci  Results called to and read back by: Arnulfo Herrera RN. 06/22/2022  17:46  Gram stain aer bottle: Gram positive cocci in clusters resembling Staph   Positive results previously called  06/23/2022  00:11  STAPHYLOCOCCUS EPIDERMIDIS* No Growth to date  No Growth to date  No Growth to date Gram stain dudley bottle: Gram positive cocci in clusters resembling Staph   Results called to and read back by: Mark Pickett RN  06/24/2022    14:36  STAPHYLOCOCCUS EPIDERMIDIS  Susceptibility pending  * No Growth to date No Growth to date     BMP:   Recent Labs   Lab 06/25/22  0248 06/25/22  0615 06/25/22  1620   *  --  133*   *   < > 135*   K 4.0  --  3.9   CL 91*  --  92*   CO2 31*  --  27   BUN 35*  --  35*   CREATININE 1.8*  --  1.8*   CALCIUM 10.4  --  10.7*   MG 2.3  --   --     < > = values in this interval not displayed.     CBC:   Recent Labs   Lab 06/24/22  0330 06/25/22  0248   WBC 10.24 12.39   HGB 9.6* 10.5*   HCT 29.5* 32.5*    445     Urine Culture: No results for input(s): LABURIN in the last 4320 hours.  Urine Studies:   Recent Labs   Lab 04/18/22  1424 05/20/22  1229   COLORU Straw  --    APPEARANCEUA Clear Clear   PHUR 7.0  --    SPECGRAV 1.010  --    PROTEINUA Negative Negative   GLUCUA Negative  --    KETONESU Negative  --    BILIRUBINUA Negative Negative   OCCULTUA 1+*  --    NITRITE Negative  --    UROBILINOGEN  --  0.2   LEUKOCYTESUR Negative Negative   RBCUA 5* <5   WBCUA 0 <5   BACTERIA  --  None Seen     Wound Culture: No results for input(s): LABAERO in the last 4320 hours.    Significant Imaging: I have reviewed all pertinent imaging results/findings within the past 24 hours.

## 2022-06-25 NOTE — CARE UPDATE
Hemodynamics Care Update Note    SVO2: 46  CVP: 13  CO: 4.8  CI: 2.1  SVR: 1323  I: 1.9 O: 5.06  (calculated using oxygen consumption constant of 3.5)    Plan:  No changes at this time      Case discussed with on call HTS attending.    Minor Silva, PGY-4  Cardiovascular Disease  [facility]

## 2022-06-25 NOTE — ASSESSMENT & PLAN NOTE
Endocrinology consulted for BG management.   BG goal 140-180    - D/C Levemir  - Transition drip at 1.6 units/hr with step-down parameters (Weight-based at 0.8 units/kg)  - Novolog 12 units TIDWM (basal/prandial matching)  - Novolog (aspart) insulin prn for BG excursions [facility] SSI (150/25).   - BG checks q4hr  - Hypoglycemia protocol in place  - If blood glucose greater than 300, please ask patient not to eat food or drink anything other than water until correctional insulin has brought it back below 250    - Will likely require IIP s/p LVAD    - Provided bedside diabetes education.    ** Please notify Endocrine for any change and/or advance in diet**  ** Please call Endocrine for any BG related issues **    Discharge Planning:   TBD. Please notify endocrinology prior to discharge.

## 2022-06-25 NOTE — ASSESSMENT & PLAN NOTE
-Admitted 5/24-5/27 with hyperglycemia hyperosmolar syndrome  -Hgb A1C 9.2 on 5/20/22  -Endocrine consulted, plan to adjust insulin orders and provide some DM education

## 2022-06-25 NOTE — SUBJECTIVE & OBJECTIVE
Interval History: No complaints. One of two blood cultures 6/23 +ve Staph epi, (taken prior to line and IABP exchange), repeat blood cultures sent today. ID following, recommend continuing IV Vanc and GUILLERMO if cultures remain positive. CVP 11, SVO2 43%, CO 4.2, CI 1.9, SVR 1700 with IABP 1:1, epi 0.04, milrinone 0.375, Marcella 10 ppm, and IV Lasix gtt at 30 mg/hr. Na improved to 131, will hold off on redosing tolvaptan. Endocrinology to adjust insulin orders with persistent hyperglycemia- appreciate their assistance.     Lines:  IABP 6/23  Right IJ 6/23    Continuous Infusions:   sodium chloride 0.9% Stopped (06/23/22 0358)    EPINEPHrine 0.04 mcg/kg/min (06/25/22 1000)    furosemide (LASIX) 10 mg/mL infusion (non-titrating) 30 mg/hr (06/25/22 1000)    heparin (porcine) in D5W 24 Units/kg/hr (06/25/22 1000)    insulin regular 1 units/mL infusion orderable (TRANSFER)      milrinone 20mg/100ml D5W (200mcg/ml) 0.375 mcg/kg/min (06/25/22 1000)    nitric oxide gas       Scheduled Meds:   acetaminophen  650 mg Oral Q8H    aspirin  81 mg Oral Daily    busPIRone  7.5 mg Oral Q12H    gabapentin  300 mg Oral TID    HYDROcodone-acetaminophen  1 tablet Oral Q8H    insulin aspart U-100  6-12 Units Subcutaneous TIDWM    LIDOcaine HCL 20 mg/ml (2%)  20 mL Other Once    methocarbamoL  500 mg Oral QID    mirtazapine  15 mg Oral Nightly    pantoprazole  40 mg Oral Daily    polyethylene glycol  17 g Oral BID    potassium chloride  30 mEq Oral BID    senna-docusate 8.6-50 mg  1 tablet Oral Daily    sucralfate  1 g Oral Nightly    vancomycin (VANCOCIN) IVPB  1,000 mg Intravenous Q12H     PRN Meds:sodium chloride, sodium chloride, sodium chloride, ALPRAZolam, bisacodyL, dextrose 10%, dextrose 10%, diphenhydrAMINE, glucagon (human recombinant), glucose, glucose, insulin aspart U-100, magnesium oxide, magnesium oxide, mupirocin, potassium bicarbonate, potassium bicarbonate, potassium bicarbonate, potassium, sodium phosphates, potassium, sodium  phosphates, potassium, sodium phosphates, promethazine, sodium chloride 0.9%, sodium chloride 0.9%, Pharmacy to dose Vancomycin consult **AND** vancomycin - pharmacy to dose    Review of patient's allergies indicates:   Allergen Reactions    Aspirin Other (See Comments)     Mr. Thacker is enrolled in Dr. Paige's Alon Trial and cannot have any aspirin and/or aspirin-containing products. DO NOT cancel any orders for INV Aspirin 100 mg/Placebo. If you have questions, please contact Isabel @ [phone], [phone],[email or web address], secure chat or MS Teams message.    Bumex [bumetanide] Hives    Lactose Diarrhea     Other reaction(s): Abdominal distension, gaseous    Torsemide Hives     Objective:     Vital Signs (Most Recent):  Temp: 97.9 °F (36.6 °C) (06/25/22 0701)  Pulse: 107 (06/25/22 1000)  Resp: (!) 41 (06/25/22 1000)  BP: (!) 133/96 (06/25/22 1000)  SpO2: 99 % (06/25/22 1000)   Vital Signs (24h Range):  Temp:  [97.9 °F (36.6 °C)-98.4 °F (36.9 °C)] 97.9 °F (36.6 °C)  Pulse:  [100-123] 107  Resp:  [8-41] 41  SpO2:  [82 %-100 %] 99 %  BP: (112-141)/(63-96) 133/96     Patient Vitals for the past 72 hrs (Last 3 readings):   Weight   06/25/22 0300 95.3 kg (210 lb 1.6 oz)       Body mass index is 26.26 kg/m².      Intake/Output Summary (Last 24 hours) at 6/25/2022 1050  Last data filed at 6/25/2022 1000  Gross per 24 hour   Intake 3327.61 ml   Output 8525 ml   Net -5197.39 ml         Hemodynamic Parameters:       Telemetry: ST    Physical Exam  Constitutional:       Appearance: Normal appearance.   HENT:      Head: Normocephalic and atraumatic.   Eyes:      Conjunctiva/sclera: Conjunctivae normal.      Pupils: Pupils are equal, round, and reactive to light.   Neck:      Comments: RIJ TLC  Cardiovascular:      Rate and Rhythm: Regular rhythm. Tachycardia present.      Comments: +S3  Pulmonary:      Effort: Pulmonary effort is normal.      Breath sounds: Normal breath sounds.   Abdominal:      General: Bowel sounds  are normal. There is distension.   Musculoskeletal:         General: No swelling. Normal range of motion.      Cervical back: Neck supple.   Skin:     General: Skin is warm and dry.      Capillary Refill: Capillary refill takes 2 to 3 seconds.   Neurological:      General: No focal deficit present.      Mental Status: He is alert and oriented to person, place, and time.   Psychiatric:         Mood and Affect: Mood normal.         Behavior: Behavior normal.         Thought Content: Thought content normal.         Judgment: Judgment normal.       Significant Labs:  CBC:  Recent Labs   Lab 06/23/22  0246 06/24/22  0330 06/25/22  0248   WBC 11.31 10.24 12.39   RBC 3.37* 3.27* 3.66*   HGB 9.9* 9.6* 10.5*   HCT 29.7* 29.5* 32.5*    411 445   MCV 88 90 89   MCH 29.4 29.4 28.7   MCHC 33.3 32.5 32.3       BNP:  No results for input(s): BNP in the last 168 hours.    Invalid input(s): BNPTRIAGELBLO    CMP:  Recent Labs   Lab 06/23/22  0246 06/23/22  1519 06/24/22  0330 06/24/22  1150 06/24/22  1518 06/24/22  1703 06/24/22  2329 06/25/22  0248 06/25/22  0615   *   < > 349*  --  241*  --   --  263*  --    CALCIUM 9.5   < > 9.2  --  10.2  --   --  10.4  --    ALBUMIN 3.4*  --  3.4*  --   --   --   --  3.6  --    PROT 7.9  --  7.4  --   --   --   --  8.6*  --    *   < > 129*   < > 132*   < > 133* 134* 131*   K 3.8   < > 4.1  --  3.7  --   --  4.0  --    CO2 31*   < > 30*  --  30*  --   --  31*  --    CL 87*   < > 87*  --  89*  --   --  91*  --    BUN 39*   < > 38*  --  30*  --   --  35*  --    CREATININE 1.7*   < > 1.9*  --  1.6*  --   --  1.8*  --    ALKPHOS 117  --  131  --   --   --   --  156*  --    ALT 21  --  19  --   --   --   --  30  --    AST 23  --  22  --   --   --   --  34  --    BILITOT 1.0  --  1.0  --   --   --   --  1.0  --     < > = values in this interval not displayed.        Coagulation:   Recent Labs   Lab 06/20/22  1607 06/21/22  0418 06/23/22  0246 06/24/22  0330 06/25/22  0248   INR 1.0   --   --   --   --    APTT  --    < > 49.0* 40.2* 44.7*    < > = values in this interval not displayed.       LDH:  Recent Labs   Lab 06/23/22  0246 06/24/22  1150 06/25/22  0248   * 395* 379*       Microbiology:  Microbiology Results (last 7 days)       Procedure Component Value Units Date/Time    Blood culture [[id number]]  (Abnormal)  (Susceptibility) Collected: 06/21/22 2250    Order Status: Completed Specimen: Blood from Peripheral, Wrist, Left Updated: 06/25/22 1029     Blood Culture, Routine Gram stain dudley bottle: Gram positive cocci      Results called to and read back by: Arnulfo Herrera RN. 06/22/2022  17:46      Gram stain aer bottle: Gram positive cocci in clusters resembling Staph       Positive results previously called 06/23/2022  00:11      STAPHYLOCOCCUS EPIDERMIDIS    Blood culture [[id number]]  (Abnormal)  (Susceptibility) Collected: 06/20/22 1651    Order Status: Completed Specimen: Blood from Peripheral, Hand, Left Updated: 06/25/22 1023     Blood Culture, Routine Gram stain dudley bottle: Gram positive cocci in clusters resembling Staph      Results called to and read back by:Tara López RN 06/21/2022  22:16      STAPHYLOCOCCUS EPIDERMIDIS    Blood culture [[id number]]  (Abnormal) Collected: 06/23/22 0108    Order Status: Completed Specimen: Blood from Peripheral, Hand, Right Updated: 06/25/22 0918     Blood Culture, Routine Gram stain dudley bottle: Gram positive cocci in clusters resembling Staph       Results called to and read back by: Mark Pickett RN  06/24/2022        14:36      STAPHYLOCOCCUS SPECIES  Identification and susceptibility pending      Blood culture [[id number]] Collected: 06/25/22 0633    Order Status: Sent Specimen: Blood from Peripheral, Antecubital, Right Updated: 06/25/22 0644    Blood culture [[id number]] Collected: 06/25/22 0634    Order Status: Sent Specimen: Blood from Peripheral, Wrist, Left Updated: 06/25/22 0644    Blood culture [[id number]] Collected: 06/23/22 0105     Order Status: Completed Specimen: Blood from Peripheral, Antecubital, Right Updated: 06/25/22 0612     Blood Culture, Routine No Growth to date      No Growth to date      No Growth to date    Blood culture [[id number]]  (Abnormal) Collected: 06/21/22 2250    Order Status: Completed Specimen: Blood from Peripheral, Wrist, Left Updated: 06/24/22 1440     Blood Culture, Routine Gram stain dudley bottle: Gram positive cocci in clusters resembling Staph      Results called to and read back by: Rosa Pardo RN. 06/22/2022  23:29      Gram stain aer bottle: Gram positive cocci in clusters resembling Staph       Positive results previously called 06/23/2022  04:33      STAPHYLOCOCCUS EPIDERMIDIS  Susceptibility pending      Blood culture [[id number]]     Order Status: Canceled Specimen: Blood             I have reviewed all pertinent labs within the past 24 hours.    Estimated Creatinine Clearance: 67.2 mL/min (A) (based on SCr of 1.8 mg/dL (H)).    Diagnostic Results:  I have reviewed and interpreted all pertinent imaging results/findings within the past 24 hours.

## 2022-06-25 NOTE — PROGRESS NOTES
0705 notified Denise Espinoza, endocrinology and LVAD coordinator that patient's knowledge base for diabetic intake is very poor. He and his partner Have never had formal diabetic teaching.    Upon entering the room today patient had drank a 1/2 gallon of sweet tea, and was eating corn chips. As such, his morning glucose was 409. Talked with patient in depth about the foods and drinks at his bedside and that his LVAD surgery could possibly canceled related to his high glucose levels.      0930 continued to educate patient and partner at bedside  Importance of keeping up with fluid intake.  Partner continues to bring in fluids and foods from outside    1205 Michael Wyman, YANET, FNP in patients room to do diabetic teaching

## 2022-06-25 NOTE — PROGRESS NOTES
06/25/2022  Ruma Li    Current provider:  Luca Lopez Jr.,*    Device interrogation:  No flowsheet data found.       Rounded on Kevan Queen to ensure all mechanical assist device settings (IABP or VAD) were appropriate and all parameters were within limits.  I was able to ensure all back up equipment was present, the staff had no issues, and the Perfusion Department daily rounding was complete.      For implantable VADs: Interrogation of Ventricular assist device was performed with analysis of device parameters and review of device function. I have personally reviewed the interrogation findings and agree with findings as stated.     In emergency, the nursing units have been notified to contact the perfusion department either by:  Calling [id number] from 630am to 4pm Mon thru Fri, utilizing the On-Call Finder functionality of Epic and searching for Perfusion, or by contacting the hospital  from 4pm to 630am and on weekends and asking to speak with the perfusionist on call.    10:56 AM

## 2022-06-26 LAB
ALBUMIN SERPL BCP-MCNC: 3.4 G/DL (ref 3.5–5.2)
ALLENS TEST: ABNORMAL
ALP SERPL-CCNC: 155 U/L (ref 55–135)
ALT SERPL W/O P-5'-P-CCNC: 29 U/L (ref 10–44)
ANION GAP SERPL CALC-SCNC: 13 MMOL/L (ref 8–16)
ANION GAP SERPL CALC-SCNC: 16 MMOL/L (ref 8–16)
APTT BLDCRRT: 49.3 SEC (ref 21–32)
AST SERPL-CCNC: 30 U/L (ref 10–40)
BACTERIA BLD CULT: ABNORMAL
BASOPHILS # BLD AUTO: 0.06 K/UL (ref 0–0.2)
BASOPHILS NFR BLD: 0.4 % (ref 0–1.9)
BILIRUB SERPL-MCNC: 1.1 MG/DL (ref 0.1–1)
BUN SERPL-MCNC: 40 MG/DL (ref 6–20)
BUN SERPL-MCNC: 43 MG/DL (ref 6–20)
CA-I BLDV-SCNC: 1.21 MMOL/L (ref 1.06–1.42)
CALCIUM SERPL-MCNC: 10.3 MG/DL (ref 8.7–10.5)
CALCIUM SERPL-MCNC: 10.6 MG/DL (ref 8.7–10.5)
CHLORIDE SERPL-SCNC: 91 MMOL/L (ref 95–110)
CHLORIDE SERPL-SCNC: 92 MMOL/L (ref 95–110)
CO2 SERPL-SCNC: 27 MMOL/L (ref 23–29)
CO2 SERPL-SCNC: 28 MMOL/L (ref 23–29)
CREAT SERPL-MCNC: 1.8 MG/DL (ref 0.5–1.4)
CREAT SERPL-MCNC: 1.9 MG/DL (ref 0.5–1.4)
DELSYS: ABNORMAL
DIFFERENTIAL METHOD: ABNORMAL
EOSINOPHIL # BLD AUTO: 0.1 K/UL (ref 0–0.5)
EOSINOPHIL NFR BLD: 0.9 % (ref 0–8)
ERYTHROCYTE [DISTWIDTH] IN BLOOD BY AUTOMATED COUNT: 14.2 % (ref 11.5–14.5)
EST. GFR  (AFRICAN AMERICAN): 50.9 ML/MIN/1.73 M^2
EST. GFR  (AFRICAN AMERICAN): 54.4 ML/MIN/1.73 M^2
EST. GFR  (NON AFRICAN AMERICAN): 44.1 ML/MIN/1.73 M^2
EST. GFR  (NON AFRICAN AMERICAN): 47 ML/MIN/1.73 M^2
FLOW: 4
FLOW: 4
GLUCOSE SERPL-MCNC: 102 MG/DL (ref 70–110)
GLUCOSE SERPL-MCNC: 197 MG/DL (ref 70–110)
HCO3 UR-SCNC: 30.1 MMOL/L (ref 24–28)
HCO3 UR-SCNC: 31.2 MMOL/L (ref 24–28)
HCO3 UR-SCNC: 32.1 MMOL/L (ref 24–28)
HCO3 UR-SCNC: 34.5 MMOL/L (ref 24–28)
HCT VFR BLD AUTO: 31.1 % (ref 40–54)
HGB BLD-MCNC: 10.5 G/DL (ref 14–18)
IMM GRANULOCYTES # BLD AUTO: 0.06 K/UL (ref 0–0.04)
IMM GRANULOCYTES NFR BLD AUTO: 0.4 % (ref 0–0.5)
LACTATE SERPL-SCNC: 1.1 MMOL/L (ref 0.5–2.2)
LDH SERPL L TO P-CCNC: 388 U/L (ref 110–260)
LYMPHOCYTES # BLD AUTO: 2.6 K/UL (ref 1–4.8)
LYMPHOCYTES NFR BLD: 17.2 % (ref 18–48)
MAGNESIUM SERPL-MCNC: 2.3 MG/DL (ref 1.6–2.6)
MCH RBC QN AUTO: 29.5 PG (ref 27–31)
MCHC RBC AUTO-ENTMCNC: 33.8 G/DL (ref 32–36)
MCV RBC AUTO: 87 FL (ref 82–98)
METHEMOGLOBIN: 0 % (ref 0–3)
MODE: ABNORMAL
MODE: ABNORMAL
MONOCYTES # BLD AUTO: 1 K/UL (ref 0.3–1)
MONOCYTES NFR BLD: 6.8 % (ref 4–15)
NEUTROPHILS # BLD AUTO: 11.2 K/UL (ref 1.8–7.7)
NEUTROPHILS NFR BLD: 74.3 % (ref 38–73)
NRBC BLD-RTO: 0 /100 WBC
PCO2 BLDA: 52.3 MMHG (ref 35–45)
PCO2 BLDA: 53.9 MMHG (ref 35–45)
PCO2 BLDA: 54.6 MMHG (ref 35–45)
PCO2 BLDA: 55.4 MMHG (ref 35–45)
PH SMN: 7.37 [PH] (ref 7.35–7.45)
PH SMN: 7.41 [PH] (ref 7.35–7.45)
PLATELET # BLD AUTO: 449 K/UL (ref 150–450)
PMV BLD AUTO: 9.9 FL (ref 9.2–12.9)
PO2 BLDA: 25 MMHG (ref 40–60)
PO2 BLDA: 25 MMHG (ref 40–60)
PO2 BLDA: 29 MMHG (ref 40–60)
PO2 BLDA: 29 MMHG (ref 40–60)
POC BE: 10 MMOL/L
POC BE: 5 MMOL/L
POC BE: 6 MMOL/L
POC BE: 7 MMOL/L
POC SATURATED O2: 42 % (ref 95–100)
POC SATURATED O2: 43 % (ref 95–100)
POC SATURATED O2: 52 % (ref 95–100)
POC SATURATED O2: 53 % (ref 95–100)
POC TCO2: 32 MMOL/L (ref 24–29)
POC TCO2: 33 MMOL/L (ref 24–29)
POC TCO2: 34 MMOL/L (ref 24–29)
POC TCO2: 36 MMOL/L (ref 24–29)
POCT GLUCOSE: 110 MG/DL (ref 70–110)
POCT GLUCOSE: 136 MG/DL (ref 70–110)
POCT GLUCOSE: 226 MG/DL (ref 70–110)
POCT GLUCOSE: 238 MG/DL (ref 70–110)
POCT GLUCOSE: 258 MG/DL (ref 70–110)
POCT GLUCOSE: 263 MG/DL (ref 70–110)
POCT GLUCOSE: 272 MG/DL (ref 70–110)
POTASSIUM SERPL-SCNC: 3.5 MMOL/L (ref 3.5–5.1)
POTASSIUM SERPL-SCNC: 4.5 MMOL/L (ref 3.5–5.1)
PROT SERPL-MCNC: 8.2 G/DL (ref 6–8.4)
RBC # BLD AUTO: 3.56 M/UL (ref 4.6–6.2)
SAMPLE: ABNORMAL
SITE: ABNORMAL
SODIUM SERPL-SCNC: 133 MMOL/L (ref 136–145)
SODIUM SERPL-SCNC: 134 MMOL/L (ref 136–145)
SP02: 100
SP02: 100
VANCOMYCIN TROUGH SERPL-MCNC: 17.7 UG/ML (ref 10–22)
WBC # BLD AUTO: 15.11 K/UL (ref 3.9–12.7)

## 2022-06-26 PROCEDURE — 25000003 PHARM REV CODE 250: Mod: NTX | Performed by: PHYSICIAN ASSISTANT

## 2022-06-26 PROCEDURE — 63600175 PHARM REV CODE 636 W HCPCS: Mod: NTX | Performed by: NURSE PRACTITIONER

## 2022-06-26 PROCEDURE — 80048 BASIC METABOLIC PNL TOTAL CA: CPT | Mod: NTX,XB | Performed by: INTERNAL MEDICINE

## 2022-06-26 PROCEDURE — 80053 COMPREHEN METABOLIC PANEL: CPT | Mod: NTX | Performed by: NURSE PRACTITIONER

## 2022-06-26 PROCEDURE — 94761 N-INVAS EAR/PLS OXIMETRY MLT: CPT | Mod: NTX

## 2022-06-26 PROCEDURE — 63600175 PHARM REV CODE 636 W HCPCS: Mod: NTX | Performed by: STUDENT IN AN ORGANIZED HEALTH CARE EDUCATION/TRAINING PROGRAM

## 2022-06-26 PROCEDURE — 80202 ASSAY OF VANCOMYCIN: CPT | Mod: NTX | Performed by: INTERNAL MEDICINE

## 2022-06-26 PROCEDURE — 99292 CRITICAL CARE ADDL 30 MIN: CPT | Mod: NTX,,, | Performed by: INTERNAL MEDICINE

## 2022-06-26 PROCEDURE — 83050 HGB METHEMOGLOBIN QUAN: CPT | Mod: NTX

## 2022-06-26 PROCEDURE — 25000003 PHARM REV CODE 250: Mod: NTX | Performed by: INTERNAL MEDICINE

## 2022-06-26 PROCEDURE — 99232 PR SUBSEQUENT HOSPITAL CARE,LEVL II: ICD-10-PCS | Mod: NTX,,, | Performed by: NURSE PRACTITIONER

## 2022-06-26 PROCEDURE — 25000003 PHARM REV CODE 250: Mod: NTX | Performed by: NURSE PRACTITIONER

## 2022-06-26 PROCEDURE — 83735 ASSAY OF MAGNESIUM: CPT | Mod: NTX | Performed by: INTERNAL MEDICINE

## 2022-06-26 PROCEDURE — 20000000 HC ICU ROOM: Mod: NTX

## 2022-06-26 PROCEDURE — 99232 SBSQ HOSP IP/OBS MODERATE 35: CPT | Mod: NTX,,, | Performed by: NURSE PRACTITIONER

## 2022-06-26 PROCEDURE — 82330 ASSAY OF CALCIUM: CPT | Mod: NTX | Performed by: PHYSICIAN ASSISTANT

## 2022-06-26 PROCEDURE — 63600175 PHARM REV CODE 636 W HCPCS: Mod: NTX | Performed by: INTERNAL MEDICINE

## 2022-06-26 PROCEDURE — 63600367 HC NITRIC OXIDE PER HOUR: Mod: NTX

## 2022-06-26 PROCEDURE — 99900035 HC TECH TIME PER 15 MIN (STAT): Mod: NTX

## 2022-06-26 PROCEDURE — 99292 PR CRITICAL CARE, ADDL 30 MIN: ICD-10-PCS | Mod: NTX,,, | Performed by: INTERNAL MEDICINE

## 2022-06-26 PROCEDURE — 27000221 HC OXYGEN, UP TO 24 HOURS: Mod: NTX

## 2022-06-26 PROCEDURE — 25000003 PHARM REV CODE 250: Mod: NTX | Performed by: STUDENT IN AN ORGANIZED HEALTH CARE EDUCATION/TRAINING PROGRAM

## 2022-06-26 PROCEDURE — 82803 BLOOD GASES ANY COMBINATION: CPT | Mod: NTX

## 2022-06-26 PROCEDURE — 83605 ASSAY OF LACTIC ACID: CPT | Mod: NTX | Performed by: PHYSICIAN ASSISTANT

## 2022-06-26 PROCEDURE — 83615 LACTATE (LD) (LDH) ENZYME: CPT | Mod: NTX | Performed by: PHYSICIAN ASSISTANT

## 2022-06-26 PROCEDURE — 99291 PR CRITICAL CARE, E/M 30-74 MINUTES: ICD-10-PCS | Mod: NTX,,, | Performed by: PHYSICIAN ASSISTANT

## 2022-06-26 PROCEDURE — 27000202 HC IABP, ADD'L DAY: Mod: NTX

## 2022-06-26 PROCEDURE — 85025 COMPLETE CBC W/AUTO DIFF WBC: CPT | Mod: NTX | Performed by: INTERNAL MEDICINE

## 2022-06-26 PROCEDURE — 99291 CRITICAL CARE FIRST HOUR: CPT | Mod: NTX,,, | Performed by: PHYSICIAN ASSISTANT

## 2022-06-26 PROCEDURE — 85730 THROMBOPLASTIN TIME PARTIAL: CPT | Mod: NTX | Performed by: INTERNAL MEDICINE

## 2022-06-26 RX ORDER — POTASSIUM CHLORIDE 750 MG/1
30 CAPSULE, EXTENDED RELEASE ORAL ONCE
Status: COMPLETED | OUTPATIENT
Start: 2022-06-26 | End: 2022-06-26

## 2022-06-26 RX ORDER — GLYCERIN 1 G/1
1 SUPPOSITORY RECTAL ONCE
Status: DISCONTINUED | OUTPATIENT
Start: 2022-06-26 | End: 2022-06-26

## 2022-06-26 RX ORDER — POTASSIUM CHLORIDE 750 MG/1
10 CAPSULE, EXTENDED RELEASE ORAL ONCE
Status: COMPLETED | OUTPATIENT
Start: 2022-06-26 | End: 2022-06-26

## 2022-06-26 RX ORDER — POTASSIUM CHLORIDE 14.9 MG/ML
20 INJECTION INTRAVENOUS ONCE
Status: COMPLETED | OUTPATIENT
Start: 2022-06-26 | End: 2022-06-26

## 2022-06-26 RX ORDER — INSULIN ASPART 100 [IU]/ML
8-12 INJECTION, SOLUTION INTRAVENOUS; SUBCUTANEOUS
Status: DISCONTINUED | OUTPATIENT
Start: 2022-06-26 | End: 2022-06-29

## 2022-06-26 RX ORDER — SPIRONOLACTONE 25 MG/1
25 TABLET ORAL DAILY
Status: DISCONTINUED | OUTPATIENT
Start: 2022-06-26 | End: 2022-06-28

## 2022-06-26 RX ADMIN — ASPIRIN 81 MG CHEWABLE TABLET 81 MG: 81 TABLET CHEWABLE at 08:06

## 2022-06-26 RX ADMIN — MIRTAZAPINE 15 MG: 15 TABLET, FILM COATED ORAL at 08:06

## 2022-06-26 RX ADMIN — GABAPENTIN 300 MG: 300 CAPSULE ORAL at 03:06

## 2022-06-26 RX ADMIN — INSULIN ASPART 6 UNITS: 100 INJECTION, SOLUTION INTRAVENOUS; SUBCUTANEOUS at 10:06

## 2022-06-26 RX ADMIN — BUSPIRONE HYDROCHLORIDE 7.5 MG: 5 TABLET ORAL at 08:06

## 2022-06-26 RX ADMIN — INSULIN ASPART 6 UNITS: 100 INJECTION, SOLUTION INTRAVENOUS; SUBCUTANEOUS at 04:06

## 2022-06-26 RX ADMIN — POTASSIUM CHLORIDE 30 MEQ: 10 CAPSULE, COATED, EXTENDED RELEASE ORAL at 08:06

## 2022-06-26 RX ADMIN — HYDROCODONE BITARTRATE AND ACETAMINOPHEN 1 TABLET: 5; 325 TABLET ORAL at 09:06

## 2022-06-26 RX ADMIN — PANTOPRAZOLE SODIUM 40 MG: 40 TABLET, DELAYED RELEASE ORAL at 08:06

## 2022-06-26 RX ADMIN — HEPARIN SODIUM 24 UNITS/KG/HR: 5000 INJECTION INTRAVENOUS; SUBCUTANEOUS at 11:06

## 2022-06-26 RX ADMIN — VANCOMYCIN HYDROCHLORIDE 1000 MG: 1 INJECTION, POWDER, LYOPHILIZED, FOR SOLUTION INTRAVENOUS at 08:06

## 2022-06-26 RX ADMIN — METHOCARBAMOL TABLETS 500 MG: 500 TABLET, COATED ORAL at 08:06

## 2022-06-26 RX ADMIN — GABAPENTIN 300 MG: 300 CAPSULE ORAL at 08:06

## 2022-06-26 RX ADMIN — SPIRONOLACTONE 25 MG: 25 TABLET, FILM COATED ORAL at 10:06

## 2022-06-26 RX ADMIN — METHOCARBAMOL TABLETS 500 MG: 500 TABLET, COATED ORAL at 05:06

## 2022-06-26 RX ADMIN — POTASSIUM CHLORIDE 30 MEQ: 10 CAPSULE, COATED, EXTENDED RELEASE ORAL at 06:06

## 2022-06-26 RX ADMIN — MILRINONE LACTATE IN DEXTROSE 0.38 MCG/KG/MIN: 200 INJECTION, SOLUTION INTRAVENOUS at 02:06

## 2022-06-26 RX ADMIN — VANCOMYCIN HYDROCHLORIDE 1000 MG: 1 INJECTION, POWDER, LYOPHILIZED, FOR SOLUTION INTRAVENOUS at 09:06

## 2022-06-26 RX ADMIN — FUROSEMIDE 20 MG/HR: 10 INJECTION, SOLUTION INTRAMUSCULAR; INTRAVENOUS at 09:06

## 2022-06-26 RX ADMIN — INSULIN ASPART 4 UNITS: 100 INJECTION, SOLUTION INTRAVENOUS; SUBCUTANEOUS at 12:06

## 2022-06-26 RX ADMIN — HYDROCODONE BITARTRATE AND ACETAMINOPHEN 1 TABLET: 5; 325 TABLET ORAL at 06:06

## 2022-06-26 RX ADMIN — MILRINONE LACTATE IN DEXTROSE 0.38 MCG/KG/MIN: 200 INJECTION, SOLUTION INTRAVENOUS at 05:06

## 2022-06-26 RX ADMIN — ALPRAZOLAM 0.25 MG: 0.25 TABLET ORAL at 08:06

## 2022-06-26 RX ADMIN — ACETAMINOPHEN 650 MG: 325 TABLET ORAL at 06:06

## 2022-06-26 RX ADMIN — SUCRALFATE 1 G: 1 TABLET ORAL at 08:06

## 2022-06-26 RX ADMIN — INSULIN ASPART 12 UNITS: 100 INJECTION, SOLUTION INTRAVENOUS; SUBCUTANEOUS at 12:06

## 2022-06-26 RX ADMIN — POTASSIUM CHLORIDE 10 MEQ: 750 CAPSULE, EXTENDED RELEASE ORAL at 05:06

## 2022-06-26 RX ADMIN — POTASSIUM CHLORIDE 20 MEQ: 14.9 INJECTION, SOLUTION INTRAVENOUS at 08:06

## 2022-06-26 RX ADMIN — METHOCARBAMOL TABLETS 500 MG: 500 TABLET, COATED ORAL at 12:06

## 2022-06-26 RX ADMIN — HYDROCODONE BITARTRATE AND ACETAMINOPHEN 1 TABLET: 5; 325 TABLET ORAL at 02:06

## 2022-06-26 RX ADMIN — HEPARIN SODIUM 24 UNITS/KG/HR: 5000 INJECTION INTRAVENOUS; SUBCUTANEOUS at 12:06

## 2022-06-26 RX ADMIN — INSULIN ASPART 12 UNITS: 100 INJECTION, SOLUTION INTRAVENOUS; SUBCUTANEOUS at 04:06

## 2022-06-26 RX ADMIN — EPINEPHRINE 0.04 MCG/KG/MIN: 1 INJECTION INTRAMUSCULAR; INTRAVENOUS; SUBCUTANEOUS at 09:06

## 2022-06-26 RX ADMIN — SENNOSIDES AND DOCUSATE SODIUM 1 TABLET: 50; 8.6 TABLET ORAL at 08:06

## 2022-06-26 NOTE — NURSING
MD Ricardo and MD Elizabeth at bedside to advance IABP. CXR at bedside on standby. IABP advanced 4cm into proper position, confirmed by CXR. Site redressed under sterile technique. Pt tolerated well. Will continue to monitor.

## 2022-06-26 NOTE — NURSING
Pt sat up in bed for a brief moment, no IABP alarms, site CDI, no bleeding or hematoma. MD Hart notified, STAT chest xray ordered. WCTM. Pt educated on need to continue to lay flat.

## 2022-06-26 NOTE — PROGRESS NOTES
[facility] - Cardiac Intensive Care  Heart Transplant  Progress Note    Patient Name: Kevan Queen  MRN: [id number]  Admission Date: 6/13/2022  Hospital Length of Stay: 13 days  Attending Physician: Luca Lopez Jr.,*  Primary Care Provider: ORALIA Cline  Principal Problem:Acute on chronic combined systolic and diastolic heart failure, NYHA class 4    Subjective:     Interval History: No complaints. IV Lasix gtt decreased from 30 to 20 mg/hr overnight. CVP 9, SVO2 53%, CO 5.0, CI 2.2, SVR 1700 with IABP 1:1, epi 0.04, milrinone 0.375, Marcella 10 ppm. Added spironolactone 25 mg QD. One of two blood cultures 6/23 +ve Staph epi, (taken prior to line and IABP exchange), repeat blood cultures 6/25 NGTD. WBC mildly elevated today at 15. ID following, recommend continuing IV Vanc and GUILLERMO if cultures remain positive. Sat up in bed overnight, will have Int Cards review IABP position (possibly low on CXR this morning).     Lines:  IABP 6/23  Right IJ 6/23    Continuous Infusions:   sodium chloride 0.9% Stopped (06/23/22 0358)    EPINEPHrine 0.04 mcg/kg/min (06/26/22 0923)    furosemide (LASIX) 10 mg/mL infusion (non-titrating) 20 mg/hr (06/26/22 0925)    heparin (porcine) in D5W 24 Units/kg/hr (06/26/22 0900)    insulin regular 1 units/mL infusion orderable (TRANSFER) 1.3 Units/hr (06/26/22 0900)    milrinone 20mg/100ml D5W (200mcg/ml) 0.375 mcg/kg/min (06/26/22 0900)    nitric oxide gas       Scheduled Meds:   acetaminophen  650 mg Oral Q8H    aspirin  81 mg Oral Daily    busPIRone  7.5 mg Oral Q12H    gabapentin  300 mg Oral TID    HYDROcodone-acetaminophen  1 tablet Oral Q8H    insulin aspart U-100  6-12 Units Subcutaneous TIDWM    LIDOcaine HCL 20 mg/ml (2%)  20 mL Other Once    methocarbamoL  500 mg Oral QID    mirtazapine  15 mg Oral Nightly    pantoprazole  40 mg Oral Daily    polyethylene glycol  17 g Oral BID    potassium chloride  30 mEq Oral BID    senna-docusate 8.6-50 mg  1 tablet Oral  Daily    spironolactone  25 mg Oral Daily    sucralfate  1 g Oral Nightly    vancomycin (VANCOCIN) IVPB  1,000 mg Intravenous Q12H     PRN Meds:sodium chloride, sodium chloride, sodium chloride, ALPRAZolam, bisacodyL, dextrose 10%, dextrose 10%, diphenhydrAMINE, glucagon (human recombinant), glucose, glucose, insulin aspart U-100, magnesium oxide, magnesium oxide, mupirocin, potassium bicarbonate, potassium bicarbonate, potassium bicarbonate, potassium, sodium phosphates, potassium, sodium phosphates, potassium, sodium phosphates, promethazine, sodium chloride 0.9%, sodium chloride 0.9%, Pharmacy to dose Vancomycin consult **AND** vancomycin - pharmacy to dose    Review of patient's allergies indicates:   Allergen Reactions    Aspirin Other (See Comments)     Mr. Thacker is enrolled in Dr. Paige's Alon Trial and cannot have any aspirin and/or aspirin-containing products. DO NOT cancel any orders for INV Aspirin 100 mg/Placebo. If you have questions, please contact Isabel @ [phone], [phone],[email or web address], secure chat or MS Teams message.    Bumex [bumetanide] Hives    Lactose Diarrhea     Other reaction(s): Abdominal distension, gaseous    Torsemide Hives     Objective:     Vital Signs (Most Recent):  Temp: 98.9 °F (37.2 °C) (06/26/22 0715)  Pulse: (!) 111 (06/26/22 0900)  Resp: (!) 35 (06/26/22 0900)  BP: 127/84 (06/26/22 0900)  SpO2: 100 % (06/26/22 0900)   Vital Signs (24h Range):  Temp:  [97.2 °F (36.2 °C)-99.1 °F (37.3 °C)] 98.9 °F (37.2 °C)  Pulse:  [105-120] 111  Resp:  [11-45] 35  SpO2:  [97 %-100 %] 100 %  BP: ()/(50-88) 127/84     Patient Vitals for the past 72 hrs (Last 3 readings):   Weight   06/26/22 0300 96.7 kg (213 lb 3 oz)   06/25/22 0300 95.3 kg (210 lb 1.6 oz)       Body mass index is 26.65 kg/m².      Intake/Output Summary (Last 24 hours) at 6/26/2022 1010  Last data filed at 6/26/2022 0900  Gross per 24 hour   Intake 2343.51 ml   Output 4800 ml   Net -2456.49 ml          Hemodynamic Parameters:       Telemetry: ST    Physical Exam  Constitutional:       Appearance: Normal appearance.   HENT:      Head: Normocephalic and atraumatic.   Eyes:      Conjunctiva/sclera: Conjunctivae normal.      Pupils: Pupils are equal, round, and reactive to light.   Neck:      Comments: RIJ TLC  Cardiovascular:      Rate and Rhythm: Regular rhythm. Tachycardia present.      Comments: +S3  Pulmonary:      Effort: Pulmonary effort is normal.      Breath sounds: Normal breath sounds.   Abdominal:      General: Bowel sounds are normal. There is distension.   Musculoskeletal:         General: No swelling. Normal range of motion.      Cervical back: Neck supple.   Skin:     General: Skin is warm and dry.      Capillary Refill: Capillary refill takes 2 to 3 seconds.   Neurological:      General: No focal deficit present.      Mental Status: He is alert and oriented to person, place, and time.   Psychiatric:         Mood and Affect: Mood normal.         Behavior: Behavior normal.         Thought Content: Thought content normal.         Judgment: Judgment normal.       Significant Labs:  CBC:  Recent Labs   Lab 06/24/22  0330 06/25/22  0248 06/26/22  0303   WBC 10.24 12.39 15.11*   RBC 3.27* 3.66* 3.56*   HGB 9.6* 10.5* 10.5*   HCT 29.5* 32.5* 31.1*    445 449   MCV 90 89 87   MCH 29.4 28.7 29.5   MCHC 32.5 32.3 33.8       BNP:  No results for input(s): BNP in the last 168 hours.    Invalid input(s): BNPTRIAGELBLO    CMP:  Recent Labs   Lab 06/24/22  0330 06/24/22  1150 06/25/22  0248 06/25/22  0615 06/25/22  1620 06/26/22  0303   *   < > 263*  --  133* 197*   CALCIUM 9.2   < > 10.4  --  10.7* 10.3   ALBUMIN 3.4*  --  3.6  --   --  3.4*   PROT 7.4  --  8.6*  --   --  8.2   *   < > 134* 131* 135* 134*   K 4.1   < > 4.0  --  3.9 3.5   CO2 30*   < > 31*  --  27 27   CL 87*   < > 91*  --  92* 91*   BUN 38*   < > 35*  --  35* 40*   CREATININE 1.9*   < > 1.8*  --  1.8* 1.8*   ALKPHOS 131   --  156*  --   --  155*   ALT 19  --  30  --   --  29   AST 22  --  34  --   --  30   BILITOT 1.0  --  1.0  --   --  1.1*    < > = values in this interval not displayed.        Coagulation:   Recent Labs   Lab 06/20/22  1607 06/21/22  0418 06/24/22  0330 06/25/22  0248 06/26/22  0303   INR 1.0  --   --   --   --    APTT  --    < > 40.2* 44.7* 49.3*    < > = values in this interval not displayed.       LDH:  Recent Labs   Lab 06/24/22  1150 06/25/22  0248 06/26/22  0303   * 379* 388*       Microbiology:  Microbiology Results (last 7 days)       Procedure Component Value Units Date/Time    Blood culture [[id number]] Collected: 06/25/22 0634    Order Status: Completed Specimen: Blood from Peripheral, Wrist, Left Updated: 06/26/22 0812     Blood Culture, Routine No Growth to date      No Growth to date    Blood culture [[id number]] Collected: 06/25/22 0633    Order Status: Completed Specimen: Blood from Peripheral, Antecubital, Right Updated: 06/26/22 0812     Blood Culture, Routine No Growth to date      No Growth to date    Blood culture [[id number]] Collected: 06/23/22 0105    Order Status: Completed Specimen: Blood from Peripheral, Antecubital, Right Updated: 06/26/22 0612     Blood Culture, Routine No Growth to date      No Growth to date      No Growth to date      No Growth to date    Blood culture [[id number]]  (Abnormal) Collected: 06/23/22 0108    Order Status: Completed Specimen: Blood from Peripheral, Hand, Right Updated: 06/25/22 1408     Blood Culture, Routine Gram stain dudley bottle: Gram positive cocci in clusters resembling Staph       Results called to and read back by: Mark Pickett RN  06/24/2022        14:36      STAPHYLOCOCCUS EPIDERMIDIS  Susceptibility pending      Blood culture [[id number]]  (Abnormal) Collected: 06/21/22 2250    Order Status: Completed Specimen: Blood from Peripheral, Wrist, Left Updated: 06/25/22 1051     Blood Culture, Routine Gram stain dudley bottle: Gram positive cocci in  clusters resembling Staph      Results called to and read back by: Rosa Pardo RN. 06/22/2022  23:29      Gram stain aer bottle: Gram positive cocci in clusters resembling Staph       Positive results previously called 06/23/2022  04:33      STAPHYLOCOCCUS EPIDERMIDIS  Susceptibility pending      Blood culture [[id number]]  (Abnormal)  (Susceptibility) Collected: 06/21/22 2250    Order Status: Completed Specimen: Blood from Peripheral, Wrist, Left Updated: 06/25/22 1029     Blood Culture, Routine Gram stain dudley bottle: Gram positive cocci      Results called to and read back by: Arnulfo Herrera RN. 06/22/2022  17:46      Gram stain aer bottle: Gram positive cocci in clusters resembling Staph       Positive results previously called 06/23/2022  00:11      STAPHYLOCOCCUS EPIDERMIDIS    Blood culture [[id number]]  (Abnormal)  (Susceptibility) Collected: 06/20/22 1651    Order Status: Completed Specimen: Blood from Peripheral, Hand, Left Updated: 06/25/22 1023     Blood Culture, Routine Gram stain dudley bottle: Gram positive cocci in clusters resembling Staph      Results called to and read back by:Tara López RN 06/21/2022  22:16      STAPHYLOCOCCUS EPIDERMIDIS    Blood culture [[id number]]     Order Status: Canceled Specimen: Blood             I have reviewed all pertinent labs within the past 24 hours.    Estimated Creatinine Clearance: 67.2 mL/min (A) (based on SCr of 1.8 mg/dL (H)).    Diagnostic Results:  I have reviewed and interpreted all pertinent imaging results/findings within the past 24 hours.    Assessment and Plan:     36 yo male with NIDCM with BiV systolic heart failure, on home Milrinone at 0.375 mcg/kg/min, presented at [facility] 4/2/22 ,was not evaluated for OHT as he has recently quit smoking in April 2022 but was approved for VAD with plan to begin OHT evaluation in upcoming months if Mr Queen is stable and suitable for OHT eval (blood group A), issues with frozen shoulder following ICD implant in  "the past, had clinic appointment last week to f/u recent admit for hyperglycemic hyperosmolar syndrome but did not come as he was "feeling too bad" presents to our ED with SOB at rest for 1 week, 6# weight gain (reports dry weight is 217#), inability to sleep past 3 nights 2/2 SOB (says he sleep on his side). Went to ED at [facility] 6/10 but left after waiting 4 hours. Had clinic appointment with us today, but arrived to [facility] early this morning and decided to go to the ED instead. Baseline Lasix dose is 80 mg bid. Reports taking 240 mg qd past 3 days with no improvement. BNP is 1701, up from 898 on 6/2 and 49 on 5/24. sCr is 1.8 with baseline ~ 1.5-1.7. sPO2 on RA is 93%. Wife at bedside    He has been given Lasix 80 mg IVP in the ED with plan to start Lasix gtt at 20 mg/hr           * Acute on chronic combined systolic and diastolic heart failure, NYHA class 4  - NID  - Approved for LVAD at [facility] on 4/2/22. Was not evaluated for OHTx as he quit smoking in April 2022  - Moved to ICU 6/13 in the setting of significant volume overloaded and low output state. IABP placed 6/13 and Epi and Marcella added for RV support.   - IABP exchanged 6/23  - Lasix gtt D/C'd 6/17 secondary to increase in sCr. Given IV NS and sCr improved  - Lasix drip restarted 6/23 at 40 mg/hr, now decreased to 20 mg/hr overnight. Given tolvaptan 6/24 with hyponatremia, which has since improved  - IABP 1:1, IV milrinone 0.375, epi 0.04, and Marcella 10 ppm  - CVP:9, SVO 53, CO: 5.0, CI: 2.2, SVR: 1700  - TTE 6/21: LVEF 15%, LVEDD: 7.14, TAPSE: 1.29 severe MR, mod TR. Mild RVE with mod reduced RV failure   - RHC done 4/19/22 on Milrinone 0.5 mcg/kg/min: RA 11, PA 50/27 (35), W 27, CO/CI 3.7/1.7, PVR 2.2 and SVR 1535   - GDMT: Entresto and Aldactone on hold since admit 5/24-5/27 2/2 elevated sCr. Resuming aldactone 25 mg QD today   - Reports dry weight is 217#, and that he had gained 6# on his home scale PTA  - Tentative VAD implant 6/29. Unlikely " OHTx candidate with smoking history (quit April 2022)     Staphylococcus epidermidis bacteremia  -Blood cultures 6/20 and repeat 6/21 positive for Staph Epi. One of two blood cultures from 6/23 positive for Staph. Repeat cultures sent 6/25 NGTD.   -IJ exchanged on 6/23, IABP exchanged 6/23  -Continue Empiric Vanc 6/22  -ID consulted given upcoming planned LVAD, continue Vanc, recommend GUILLERMO if cultures remain positive    Muscle cramping  -decreased scheduled Percocet and starting Gabapentin    Uncontrolled diabetes mellitus  -Admitted 5/24-5/27 with hyperglycemia hyperosmolar syndrome  -Hgb A1C 9.2 on 5/20/22  -Endocrine consulted, adjusedt insulin orders and provided DM education     CKD (chronic kidney disease) stage 3, GFR 30-59 ml/min  - Admit sCr 1.8, baseline ~ 1.5-1.7  - Creatinine elevated to 2.0 but improved after fluid.    - Creatinine has been stable, but increased to 1.8 today   - Will monitor response    Cardiogenic shock  -See A/C CHF    Uninterrupted Critical Care/Counseling Time (not including procedures): 65 minutes      Denise Espinoza PA-C  Heart Transplant  [facility] - Cardiac Int[facility]

## 2022-06-26 NOTE — SUBJECTIVE & OBJECTIVE
Interval History: No complaints. IV Lasix gtt decreased from 30 to 20 mg/hr overnight. CVP 9, SVO2 53%, CO 5.0, CI 2.2, SVR 1700 with IABP 1:1, epi 0.04, milrinone 0.375, Marcella 10 ppm. Added spironolactone 25 mg QD. One of two blood cultures 6/23 +ve Staph epi, (taken prior to line and IABP exchange), repeat blood cultures 6/25 NGTD. WBC mildly elevated today at 15. ID following, recommend continuing IV Vanc and GUILLERMO if cultures remain positive. Sat up in bed overnight, will have Int Cards review IABP position (possibly low on CXR this morning).     Lines:  IABP 6/23  Right IJ 6/23    Continuous Infusions:   sodium chloride 0.9% Stopped (06/23/22 0358)    EPINEPHrine 0.04 mcg/kg/min (06/26/22 0923)    furosemide (LASIX) 10 mg/mL infusion (non-titrating) 20 mg/hr (06/26/22 0925)    heparin (porcine) in D5W 24 Units/kg/hr (06/26/22 0900)    insulin regular 1 units/mL infusion orderable (TRANSFER) 1.3 Units/hr (06/26/22 0900)    milrinone 20mg/100ml D5W (200mcg/ml) 0.375 mcg/kg/min (06/26/22 0900)    nitric oxide gas       Scheduled Meds:   acetaminophen  650 mg Oral Q8H    aspirin  81 mg Oral Daily    busPIRone  7.5 mg Oral Q12H    gabapentin  300 mg Oral TID    HYDROcodone-acetaminophen  1 tablet Oral Q8H    insulin aspart U-100  6-12 Units Subcutaneous TIDWM    LIDOcaine HCL 20 mg/ml (2%)  20 mL Other Once    methocarbamoL  500 mg Oral QID    mirtazapine  15 mg Oral Nightly    pantoprazole  40 mg Oral Daily    polyethylene glycol  17 g Oral BID    potassium chloride  30 mEq Oral BID    senna-docusate 8.6-50 mg  1 tablet Oral Daily    spironolactone  25 mg Oral Daily    sucralfate  1 g Oral Nightly    vancomycin (VANCOCIN) IVPB  1,000 mg Intravenous Q12H     PRN Meds:sodium chloride, sodium chloride, sodium chloride, ALPRAZolam, bisacodyL, dextrose 10%, dextrose 10%, diphenhydrAMINE, glucagon (human recombinant), glucose, glucose, insulin aspart U-100, magnesium oxide, magnesium oxide, mupirocin, potassium bicarbonate,  potassium bicarbonate, potassium bicarbonate, potassium, sodium phosphates, potassium, sodium phosphates, potassium, sodium phosphates, promethazine, sodium chloride 0.9%, sodium chloride 0.9%, Pharmacy to dose Vancomycin consult **AND** vancomycin - pharmacy to dose    Review of patient's allergies indicates:   Allergen Reactions    Aspirin Other (See Comments)     Mr. Thacker is enrolled in Dr. Paige's Alon Trial and cannot have any aspirin and/or aspirin-containing products. DO NOT cancel any orders for INV Aspirin 100 mg/Placebo. If you have questions, please contact Isabel @ [phone], [phone],[email or web address], secure chat or [employer] message.    Bumex [bumetanide] Hives    Lactose Diarrhea     Other reaction(s): Abdominal distension, gaseous    Torsemide Hives     Objective:     Vital Signs (Most Recent):  Temp: 98.9 °F (37.2 °C) (06/26/22 0715)  Pulse: (!) 111 (06/26/22 0900)  Resp: (!) 35 (06/26/22 0900)  BP: 127/84 (06/26/22 0900)  SpO2: 100 % (06/26/22 0900)   Vital Signs (24h Range):  Temp:  [97.2 °F (36.2 °C)-99.1 °F (37.3 °C)] 98.9 °F (37.2 °C)  Pulse:  [105-120] 111  Resp:  [11-45] 35  SpO2:  [97 %-100 %] 100 %  BP: ()/(50-88) 127/84     Patient Vitals for the past 72 hrs (Last 3 readings):   Weight   06/26/22 0300 96.7 kg (213 lb 3 oz)   06/25/22 0300 95.3 kg (210 lb 1.6 oz)       Body mass index is 26.65 kg/m².      Intake/Output Summary (Last 24 hours) at 6/26/2022 1010  Last data filed at 6/26/2022 0900  Gross per 24 hour   Intake 2343.51 ml   Output 4800 ml   Net -2456.49 ml         Hemodynamic Parameters:       Telemetry: ST    Physical Exam  Constitutional:       Appearance: Normal appearance.   HENT:      Head: Normocephalic and atraumatic.   Eyes:      Conjunctiva/sclera: Conjunctivae normal.      Pupils: Pupils are equal, round, and reactive to light.   Neck:      Comments: RIJ TLC  Cardiovascular:      Rate and Rhythm: Regular rhythm. Tachycardia present.      Comments:  +S3  Pulmonary:      Effort: Pulmonary effort is normal.      Breath sounds: Normal breath sounds.   Abdominal:      General: Bowel sounds are normal. There is distension.   Musculoskeletal:         General: No swelling. Normal range of motion.      Cervical back: Neck supple.   Skin:     General: Skin is warm and dry.      Capillary Refill: Capillary refill takes 2 to 3 seconds.   Neurological:      General: No focal deficit present.      Mental Status: He is alert and oriented to person, place, and time.   Psychiatric:         Mood and Affect: Mood normal.         Behavior: Behavior normal.         Thought Content: Thought content normal.         Judgment: Judgment normal.       Significant Labs:  CBC:  Recent Labs   Lab 06/24/22  0330 06/25/22  0248 06/26/22  0303   WBC 10.24 12.39 15.11*   RBC 3.27* 3.66* 3.56*   HGB 9.6* 10.5* 10.5*   HCT 29.5* 32.5* 31.1*    445 449   MCV 90 89 87   MCH 29.4 28.7 29.5   MCHC 32.5 32.3 33.8       BNP:  No results for input(s): BNP in the last 168 hours.    Invalid input(s): BNPTRIAGELBLO    CMP:  Recent Labs   Lab 06/24/22  0330 06/24/22  1150 06/25/22  0248 06/25/22  0615 06/25/22  1620 06/26/22  0303   *   < > 263*  --  133* 197*   CALCIUM 9.2   < > 10.4  --  10.7* 10.3   ALBUMIN 3.4*  --  3.6  --   --  3.4*   PROT 7.4  --  8.6*  --   --  8.2   *   < > 134* 131* 135* 134*   K 4.1   < > 4.0  --  3.9 3.5   CO2 30*   < > 31*  --  27 27   CL 87*   < > 91*  --  92* 91*   BUN 38*   < > 35*  --  35* 40*   CREATININE 1.9*   < > 1.8*  --  1.8* 1.8*   ALKPHOS 131  --  156*  --   --  155*   ALT 19  --  30  --   --  29   AST 22  --  34  --   --  30   BILITOT 1.0  --  1.0  --   --  1.1*    < > = values in this interval not displayed.        Coagulation:   Recent Labs   Lab 06/20/22  1607 06/21/22  0418 06/24/22  0330 06/25/22  0248 06/26/22  0303   INR 1.0  --   --   --   --    APTT  --    < > 40.2* 44.7* 49.3*    < > = values in this interval not displayed.        LDH:  Recent Labs   Lab 06/24/22  1150 06/25/22  0248 06/26/22  0303   * 379* 388*       Microbiology:  Microbiology Results (last 7 days)       Procedure Component Value Units Date/Time    Blood culture [[id number]] Collected: 06/25/22 0634    Order Status: Completed Specimen: Blood from Peripheral, Wrist, Left Updated: 06/26/22 0812     Blood Culture, Routine No Growth to date      No Growth to date    Blood culture [[id number]] Collected: 06/25/22 0633    Order Status: Completed Specimen: Blood from Peripheral, Antecubital, Right Updated: 06/26/22 0812     Blood Culture, Routine No Growth to date      No Growth to date    Blood culture [[id number]] Collected: 06/23/22 0105    Order Status: Completed Specimen: Blood from Peripheral, Antecubital, Right Updated: 06/26/22 0612     Blood Culture, Routine No Growth to date      No Growth to date      No Growth to date      No Growth to date    Blood culture [[id number]]  (Abnormal) Collected: 06/23/22 0108    Order Status: Completed Specimen: Blood from Peripheral, Hand, Right Updated: 06/25/22 1408     Blood Culture, Routine Gram stain dudley bottle: Gram positive cocci in clusters resembling Staph       Results called to and read back by: Mark Pickett RN  06/24/2022        14:36      STAPHYLOCOCCUS EPIDERMIDIS  Susceptibility pending      Blood culture [[id number]]  (Abnormal) Collected: 06/21/22 2250    Order Status: Completed Specimen: Blood from Peripheral, Wrist, Left Updated: 06/25/22 1051     Blood Culture, Routine Gram stain dudley bottle: Gram positive cocci in clusters resembling Staph      Results called to and read back by: Rosa Pardo RN. 06/22/2022  23:29      Gram stain aer bottle: Gram positive cocci in clusters resembling Staph       Positive results previously called 06/23/2022  04:33      STAPHYLOCOCCUS EPIDERMIDIS  Susceptibility pending      Blood culture [[id number]]  (Abnormal)  (Susceptibility) Collected: 06/21/22 2250    Order Status:  Completed Specimen: Blood from Peripheral, Wrist, Left Updated: 06/25/22 1029     Blood Culture, Routine Gram stain dudley bottle: Gram positive cocci      Results called to and read back by: Arnulfo Herrera RN. 06/22/2022  17:46      Gram stain aer bottle: Gram positive cocci in clusters resembling Staph       Positive results previously called 06/23/2022  00:11      STAPHYLOCOCCUS EPIDERMIDIS    Blood culture [[id number]]  (Abnormal)  (Susceptibility) Collected: 06/20/22 1651    Order Status: Completed Specimen: Blood from Peripheral, Hand, Left Updated: 06/25/22 1023     Blood Culture, Routine Gram stain dudley bottle: Gram positive cocci in clusters resembling Staph      Results called to and read back by:Tara López RN 06/21/2022  22:16      STAPHYLOCOCCUS EPIDERMIDIS    Blood culture [[id number]]     Order Status: Canceled Specimen: Blood             I have reviewed all pertinent labs within the past 24 hours.    Estimated Creatinine Clearance: 67.2 mL/min (A) (based on SCr of 1.8 mg/dL (H)).    Diagnostic Results:  I have reviewed and interpreted all pertinent imaging results/findings within the past 24 hours.

## 2022-06-26 NOTE — ASSESSMENT & PLAN NOTE
-Admitted 5/24-5/27 with hyperglycemia hyperosmolar syndrome  -Hgb A1C 9.2 on 5/20/22  -Endocrine consulted, adjusedt insulin orders and provided DM education

## 2022-06-26 NOTE — PLAN OF CARE
CICU DAILY GOALS       A: Awake    RASS: Goal - RASS Goal: 0-->alert and calm  Actual - RASS (Diggs Agitation-Sedation Scale): 0-->alert and calm   Restraint necessity:    B: Breath   SBT: Not intubated   C: Coordinate A & B, analgesics/sedatives   Pain: managed    SAT: Not intubated  D: Delirium   CAM-ICU: Overall CAM-ICU: Negative  E: Early(intubated/ Progressive (non-intubated) Mobility   MOVE Screen: Fail   Activity: Activity Management: Ankle pumps - L1, Arm raise - L1, Rolling - L1  FAS: Feeding/Nutrition   Diet order: Diet/Nutrition Received: 2 gram sodium, low saturated fat/low cholesterol,   Fluid restriction: Fluid Requirement: 1500  T: Thrombus   DVT prophylaxis: VTE Required Core Measure:  (heparin gtt)  H: HOB Elevation   Head of Bed (HOB) Positioning: HOB at 15 degrees  U: Ulcer Prophylaxis   GI: yes  G: Glucose control   uncontrolled Glycemic Management: blood glucose monitored  S: Skin   Bundle compliance: yes   Bathing/Skin Care: linen changed Date: AM bath  B: Bowel Function   no issues   I: Indwelling Catheters   Cartwright necessity:     CVC necessity: Yes   IPAD offered: No  D: De-escalation Antibx   No  Plan for the day   Monitor CVP 9, 10, 10, decreased Lasix gtt to 20 mg/hr, SVO2 this AM 43, IABP site CDI, no hematoma, pulses palpable, RIJ dressing change this shift, POC BG remains elevated despite insulin gtt.  Family/Goals of care/Code Status   Code Status: Full Code     No acute events throughout day, VS and assessment per flow sheet, patient progressing towards goals as tolerated, plan of care reviewed with Kevan Queen and family, all concerns addressed, will continue to monitor

## 2022-06-26 NOTE — NURSING
"Pt refused breakfast tray, only interested in boost. Stated significant other is bringing him breakfast, states she is bringing him "chicken". Food that was brought to him by sig other was 2 pieces of fried chicken. Education provided to pt about heart healthy & diabetic friendly diet choices, pt verbalized understanding. Endocrine, HTS updated.    " Cardiac, Vascular and Thoracic Surgeons  Progress Note    1/12/2020 7:22 PM  Surgeon: General Erazo POD# 4  S/P : Coronary artery bypass mitral valve repair atrial appendage closure PFO closure    Chief complaint:  No major complaint or event overnight kidney function improved  Subjective:  Ms. Eliot Black   Complain:   Event:   Pain:   Bowel movements:     Vital Signs: /66   Pulse 103   Temp 99 °F (37.2 °C) (Oral)   Resp 16   Ht 5' 1.5\" (1.562 m)   Wt 140 lb 3.4 oz (63.6 kg)   SpO2 97%   BMI 26.06 kg/m²  O2 Flow Rate (L/min): 1 L/min     I/O:      Intake/Output Summary (Last 24 hours) at 1/12/2020 1922  Last data filed at 1/12/2020 1830  Gross per 24 hour   Intake 1738 ml   Output 3300 ml   Net -1562 ml       Exam:   Cardiovascular: S1 plus S2 +0 no additional sound  Pulmonary: Sound  Incision: Dry and clean    CLabs:   CBC:   Recent Labs     01/10/20  0510 01/11/20  0730 01/12/20  0345   WBC 6.7 8.7 7.3   HGB 10.6* 11.2* 11.2*   HCT 31.5* 33.1* 33.2*   MCV 93.2 93.5 92.8   PLT 69* 84* 126*     BMP:   Recent Labs     01/10/20  0510 01/11/20  0730 01/12/20  0345    136 138   K 4.2  4.2 3.8  3.8 4.1  4.1    100 100   CO2 21 23 24   BUN 23* 29* 41*   CREATININE 1.7* 1.2 1.3*     PT/INR: No results for input(s): PROTIME, INR in the last 72 hours. APTT: No results for input(s): APTT in the last 72 hours.     Scheduled Meds:    metFORMIN  500 mg Oral BID WC    lisinopril  2.5 mg Oral Daily    polyethylene glycol  17 g Oral Daily    cefTRIAXone (ROCEPHIN) IV  1 g Intravenous Q24H    furosemide  20 mg Intravenous BID    sodium chloride flush  10 mL Intravenous 2 times per day    docusate sodium  100 mg Oral BID    famotidine  20 mg Oral Daily    metoprolol tartrate  12.5 mg Oral BID    chlorhexidine  15 mL Mouth/Throat BID    mupirocin   Nasal BID    atorvastatin  20 mg Oral Nightly    aspirin  81 mg Oral Daily    clopidogrel  75 mg Oral Daily    albuterol  2.5 mg Nebulization Q4H WA   

## 2022-06-26 NOTE — PROGRESS NOTES
06/26/2022  Ruma Li    Current provider:  Luca Lopez Jr.,*    Device interrogation:  No flowsheet data found.       Rounded on Kevan Queen to ensure all mechanical assist device settings (IABP or VAD) were appropriate and all parameters were within limits.  I was able to ensure all back up equipment was present, the staff had no issues, and the Perfusion Department daily rounding was complete.      For implantable VADs: Interrogation of Ventricular assist device was performed with analysis of device parameters and review of device function. I have personally reviewed the interrogation findings and agree with findings as stated.     In emergency, the nursing units have been notified to contact the perfusion department either by:  Calling [phone] from 630am to 4pm Mon thru Fri, utilizing the On-Call Finder functionality of Epic and searching for Perfusion, or by contacting the hospital  from 4pm to 630am and on weekends and asking to speak with the perfusionist on call.    11:51 AM

## 2022-06-26 NOTE — SUBJECTIVE & OBJECTIVE
"Interval HPI:   Overnight events: No acute events overnight. Patient on the CICU in room [id number] A. Blood glucose improving. BG above goal on current insulin regimen (SSI, prandial, and basal insulin ). Steroid use- None. 3 Days Post-Op  Renal function- Abnormal - Creatinine 1.8   Vasopressors-  None       Diet diabetic [facility];  Calorie; Cardiac (Low Na/Chol); Isolation Tray - Regular [place]     Eatin%  Nausea: No  Hypoglycemia and intervention: No  Fever: No  TPN and/or TF: No      /84 (BP Location: Left arm, Patient Position: Lying)   Pulse (!) 111   Temp 98.9 °F (37.2 °C) (Oral)   Resp (!) 35   Ht 6' 3" (1.905 m)   Wt 96.7 kg (213 lb 3 oz)   SpO2 100%   BMI 26.65 kg/m²     Labs Reviewed and Include    Recent Labs   Lab 22  0303   *   CALCIUM 10.3   ALBUMIN 3.4*   PROT 8.2   *   K 3.5   CO2 27   CL 91*   BUN 40*   CREATININE 1.8*   ALKPHOS 155*   ALT 29   AST 30   BILITOT 1.1*     Lab Results   Component Value Date    WBC 15.11 (H) 2022    HGB 10.5 (L) 2022    HCT 31.1 (L) 2022    MCV 87 2022     2022     No results for input(s): TSH, FREET4 in the last 168 hours.  Lab Results   Component Value Date    HGBA1C 9.2 (H) 2022       Nutritional status:   Body mass index is 26.65 kg/m².  Lab Results   Component Value Date    ALBUMIN 3.4 (L) 2022    ALBUMIN 3.6 2022    ALBUMIN 3.4 (L) 2022     Lab Results   Component Value Date    PREALBUMIN 36 2022       Estimated Creatinine Clearance: 67.2 mL/min (A) (based on SCr of 1.8 mg/dL (H)).    Accu-Checks  Recent Labs     22  0753 22  1126 22  1313 22  1403 22  1622 22  2118 22  2359 22  0429 22  0431 22  0819   POCTGLUCOSE 459* 294* 309* 234* 136* 162* 226* 263* 258* 272*       Current Medications and/or Treatments Impacting Glycemic Control  Immunotherapy:    Immunosuppressants       None "          Steroids:   Hormones (From admission, onward)                None          Pressors:    Autonomic Drugs (From admission, onward)                Start     Stop Route Frequency Ordered    06/13/22 2130  EPINEPHrine (ADRENALIN) 5 mg in dextrose 5 % 250 mL infusion         -- IV Continuous 06/13/22 2024          Hyperglycemia/Diabetes Medications:   Antihyperglycemics (From admission, onward)                Start     Stop Route Frequency Ordered    06/25/22 1130  insulin regular in 0.9 % NaCl 100 unit/100 mL (1 unit/mL) infusion        Question:  Enter initial dose (Units/hr):  Answer:  1.6    -- IV Continuous 06/25/22 1028    06/25/22 1130  insulin aspart U-100 pen 6-12 Units         -- SubQ 3 times daily with meals 06/25/22 1028    06/25/22 1127  insulin aspart U-100 pen 0-10 Units         -- SubQ As needed (PRN) 06/25/22 1028

## 2022-06-26 NOTE — NURSING
Endocrine at bedside. Lunch . Pt ate apple after breakfast (ate @ 1030) and before lunch CBG check. Pt states ready to eat lunch now. Per Garo, NP: ordered to administer 12 units with lunch as ordered, hold SSI at this time. Will implement. Per NP, mealtime insulin increasing to 8-12 units with meals for future meals. Will continue to monitor.

## 2022-06-26 NOTE — CARE UPDATE
Hemodynamics Care Update Note    On Epi 0.04, Milrinone 0.375, Lasix 30, Marcella 10, IABP 1:1.  SVO2: 47  CVP: 9  CO: 4.5  CI: 2  SVR: 1280     MAP 81  -225 cc/hr  (calculated using oxygen consumption constant of 3.5)       Plan:  - Reduce Lasix gtt to 20 mg/hr    Case discussed with Dr Angela Yang DO.    Favian Hart MD PGY4  Cardiovascular Medicine Fellow  [facility]  Pager: [phone]

## 2022-06-26 NOTE — ASSESSMENT & PLAN NOTE
Endocrinology consulted for BG management.   BG goal 140-180    - Transition drip at 1.6 units/hr with step-down parameters (Weight-based at 0.8 units/kg)  - Novolog 6-12 units TIDWM (basal/prandial matching)  - Novolog (aspart) insulin prn for BG excursions [facility] SSI (150/25).   - BG checks q4hr  - Hypoglycemia protocol in place  - If blood glucose greater than 300, please ask patient not to eat food or drink anything other than water until correctional insulin has brought it back below 250    - Will likely require IIP s/p LVAD    - Provided bedside diabetes education.    ** Please notify Endocrine for any change and/or advance in diet**  ** Please call Endocrine for any BG related issues **    Discharge Planning:   TBD. Please notify endocrinology prior to discharge.

## 2022-06-26 NOTE — PROGRESS NOTES
"[facility] - Cardiac Intensive Care  Endocrinology  Progress Note    Admit Date: 2022     Reason for Consult: Management of  type 2 DM, Hyperglycemia     Surgical Procedure and Date: none    Diabetes diagnosis year: 21    Home Diabetes Medications:  Detemir  23  units daily and Aspart   12  units TIDWM and  Mod dose correction insulin    Lab Results   Component Value Date    HGBA1C 9.2 (H) 2022           How often checking glucose at home? 1-3 x day   BG readings on regimen: 180- up to 300 once  Hypoglycemia on the regimen?  yes once- related to not really eating after taking aspart   Missed doses on regimen?  no    Diabetes Complications include:     Hyperglycemia    Complicating diabetes co morbidities:   History of MI, CHF, and CKD      HPI:   Patient is a 37 y.o. male with a diagnosis of type 2 DM and NIDCM with BiV systolic heart failure. Patient was presented at [facility]  and was  approved for VAD. Also recently admitted with [facility]; he had clinic appointment last week to f/u recent admit for hyperglycemic hyperosmolar syndrome but did not come as he was "feeling too bad" presents to  ED with SOB. Endocrinology consulted for BG/ DM management.                Interval HPI:   Overnight events: No acute events overnight. Patient on the CICU in room [id number] A. Blood glucose improving. BG above goal on current insulin regimen (SSI, prandial, and basal insulin ). Steroid use- None. 3 Days Post-Op  Renal function- Abnormal - Creatinine 1.8   Vasopressors-  None       Diet diabetic [facility]; 2000 Calorie; Cardiac (Low Na/Chol); Isolation Tray - Regular [place]     Eatin%  Nausea: No  Hypoglycemia and intervention: No  Fever: No  TPN and/or TF: No      /84 (BP Location: Left arm, Patient Position: Lying)   Pulse (!) 111   Temp 98.9 °F (37.2 °C) (Oral)   Resp (!) 35   Ht 6' 3" (1.905 m)   Wt 96.7 kg (213 lb 3 oz)   SpO2 100%   BMI 26.65 kg/m²     Labs Reviewed and " Include    Recent Labs   Lab 06/26/22  0303   *   CALCIUM 10.3   ALBUMIN 3.4*   PROT 8.2   *   K 3.5   CO2 27   CL 91*   BUN 40*   CREATININE 1.8*   ALKPHOS 155*   ALT 29   AST 30   BILITOT 1.1*     Lab Results   Component Value Date    WBC 15.11 (H) 06/26/2022    HGB 10.5 (L) 06/26/2022    HCT 31.1 (L) 06/26/2022    MCV 87 06/26/2022     06/26/2022     No results for input(s): TSH, FREET4 in the last 168 hours.  Lab Results   Component Value Date    HGBA1C 9.2 (H) 05/20/2022       Nutritional status:   Body mass index is 26.65 kg/m².  Lab Results   Component Value Date    ALBUMIN 3.4 (L) 06/26/2022    ALBUMIN 3.6 06/25/2022    ALBUMIN 3.4 (L) 06/24/2022     Lab Results   Component Value Date    PREALBUMIN 36 04/18/2022       Estimated Creatinine Clearance: 67.2 mL/min (A) (based on SCr of 1.8 mg/dL (H)).    Accu-Checks  Recent Labs     06/25/22  0753 06/25/22  1126 06/25/22  1313 06/25/22  1403 06/25/22  1622 06/25/22  2118 06/25/22  2359 06/26/22  0429 06/26/22  0431 06/26/22  0819   POCTGLUCOSE 459* 294* 309* 234* 136* 162* 226* 263* 258* 272*       Current Medications and/or Treatments Impacting Glycemic Control  Immunotherapy:    Immunosuppressants       None          Steroids:   Hormones (From admission, onward)                None          Pressors:    Autonomic Drugs (From admission, onward)                Start     Stop Route Frequency Ordered    06/13/22 2130  EPINEPHrine (ADRENALIN) 5 mg in dextrose 5 % 250 mL infusion         -- IV Continuous 06/13/22 2024          Hyperglycemia/Diabetes Medications:   Antihyperglycemics (From admission, onward)                Start     Stop Route Frequency Ordered    06/25/22 1130  insulin regular in 0.9 % NaCl 100 unit/100 mL (1 unit/mL) infusion        Question:  Enter initial dose (Units/hr):  Answer:  1.6    -- IV Continuous 06/25/22 1028    06/25/22 1130  insulin aspart U-100 pen 6-12 Units         -- SubQ 3 times daily with meals 06/25/22 1028     06/25/22 1127  insulin aspart U-100 pen 0-10 Units         -- SubQ As needed (PRN) 06/25/22 1028            ASSESSMENT and PLAN    * Acute on chronic combined systolic and diastolic heart failure, NYHA class 4  Optimize glucose control and  avoid hypoglycemia    Managed per primary.       Uncontrolled diabetes mellitus  Endocrinology consulted for BG management.   BG goal 140-180    - Transition drip at 1.6 units/hr with step-down parameters (Weight-based at 0.8 units/kg)  - Novolog 6-12 units TIDWM (basal/prandial matching)  - Novolog (aspart) insulin prn for BG excursions [facility] (150/25).   - BG checks q4hr  - Hypoglycemia protocol in place  - If blood glucose greater than 300, please ask patient not to eat food or drink anything other than water until correctional insulin has brought it back below 250    - Will likely require IIP s/p LVAD    - Provided bedside diabetes education.    ** Please notify Endocrine for any change and/or advance in diet**  ** Please call Endocrine for any BG related issues **    Discharge Planning:   TBD. Please notify endocrinology prior to discharge.        CKD (chronic kidney disease) stage 3, GFR 30-59 ml/min    Titrate insulin slowly to avoid hypoglycemia as the risk of hypoglycemia increases with decreased creatinine clearance.    Estimated Creatinine Clearance: 67.2 mL/min (A) (based on SCr of 1.8 mg/dL (H)).           Michael Wyman, DNP, FNP  Endocrinology  [facility] - Cardiac Intensive Care

## 2022-06-26 NOTE — PLAN OF CARE
Infectious Disease Note      Chart has been reviewed. Continue antibiotics. Will follow up cultures. Will follow up tomorrow. Please call if questions arise.     Melly Horta MD  Infectious Diseases

## 2022-06-26 NOTE — ASSESSMENT & PLAN NOTE
- [facility]  - Approved for LVAD at [facility] on 4/2/22. Was not evaluated for OHTx as he quit smoking in April 2022  - Moved to ICU 6/13 in the setting of significant volume overloaded and low output state. IABP placed 6/13 and Epi and Marcella added for RV support.   - IABP exchanged 6/23  - Lasix gtt D/C'd 6/17 secondary to increase in sCr. Given IV NS and sCr improved  - Lasix drip restarted 6/23 at 40 mg/hr, now decreased to 20 mg/hr overnight. Given tolvaptan 6/24 with hyponatremia, which has since improved  - IABP 1:1, IV milrinone 0.375, epi 0.04, and Marcella 10 ppm  - CVP:9, SVO 53, CO: 5.0, CI: 2.2, SVR: 1700  - TTE 6/21: LVEF 15%, LVEDD: 7.14, TAPSE: 1.29 severe MR, mod TR. Mild RVE with mod reduced RV failure   - RHC done 4/19/22 on Milrinone 0.5 mcg/kg/min: RA 11, PA 50/27 (35), W 27, CO/CI 3.7/1.7, PVR 2.2 and SVR 1535   - GDMT: Entresto and Aldactone on hold since admit 5/24-5/27 2/2 elevated sCr. Resuming aldactone 25 mg QD today   - Reports dry weight is 217#, and that he had gained 6# on his home scale PTA  - Tentative VAD implant 6/29. Unlikely OHTx candidate with smoking history (quit April 2022)

## 2022-06-26 NOTE — ASSESSMENT & PLAN NOTE
-Blood cultures 6/20 and repeat 6/21 positive for Staph Epi. One of two blood cultures from 6/23 positive for Staph. Repeat cultures sent 6/25 NGTD.   -IJ exchanged on 6/23, IABP exchanged 6/23  -Continue Empiric Vanc 6/22  -ID consulted given upcoming planned LVAD, continue Vanc, recommend GUILLERMO if cultures remain positive

## 2022-06-26 NOTE — ASSESSMENT & PLAN NOTE
- Admit sCr 1.8, baseline ~ 1.5-1.7  - Creatinine elevated to 2.0 but improved after fluid.    - Creatinine has been stable, but increased to 1.8 today   - Will monitor response

## 2022-06-26 NOTE — PROGRESS NOTES
Pharmacokinetic Assessment Follow Up: IV Vancomycin    Vancomycin serum concentration assessment(s):    · The trough level was drawn correctly and can be used to guide therapy at this time. The measurement is within the desired definitive target range of 15 to 20 mcg/mL.  · Yesterday's dose was given late (20:00h instead of 15:00h), but the trough is reflective of 12 hour post dose level).   · SCr remains elevated at 1.8mg/dL.     Vancomycin Regimen Plan:    · Continue vancomycin 1000mg IV q12h. With elevated SCr and the off timed dose repeat level tomorrow at 20:00h 60 minutes prior to the evening dose to ensure within therapeutic range. Repeat sooner if renal function significantly changes.     Drug levels (last 3 results):  Recent Labs   Lab Result Units 06/24/22  0111 06/26/22  0820   Vancomycin-Trough ug/mL 14.4 17.7       Pharmacy will continue to follow and monitor vancomycin.    Please contact pharmacy at extension [phone] for questions regarding this assessment.    Thank you for the consult,   Angela Sanchez, PharmD, BCPS, [facility] Clinical Pharmacy Specialist  EXT [phone]       Patient brief summary:  Kevan Queen is a 37 y.o. male initiated on antimicrobial therapy with IV Vancomycin for treatment of bacteremia    The patient's current regimen is vancomycin 1000mg IV q12h     Drug Allergies:   Review of patient's allergies indicates:   Allergen Reactions    Aspirin Other (See Comments)     Mr. Thacker is enrolled in Dr. Paige's Alon Trial and cannot have any aspirin and/or aspirin-containing products. DO NOT cancel any orders for INV Aspirin 100 mg/Placebo. If you have questions, please contact Isabel @ [phone], [phone],[email or web address], secure chat or MS Teams message.    Bumex [bumetanide] Hives    Lactose Diarrhea     Other reaction(s): Abdominal distension, gaseous    Torsemide Hives       Actual Body Weight:   96.7kg    Renal Function:   Estimated Creatinine  Clearance: 67.2 mL/min (A) (based on SCr of 1.8 mg/dL (H)).,     Dialysis Method (if applicable):  N/A    CBC (last 72 hours):  Recent Labs   Lab Result Units 06/24/22  0330 06/25/22  0248 06/26/22  0303   WBC K/uL 10.24 12.39 15.11*   Hemoglobin g/dL 9.6* 10.5* 10.5*   Hematocrit % 29.5* 32.5* 31.1*   Platelets K/uL 411 445 449   Gran % % 69.9 74.6* 74.3*   Lymph % % 18.6 15.9* 17.2*   Mono % % 9.4 7.7 6.8   Eosinophil % % 1.0 0.9 0.9   Basophil % % 0.6 0.4 0.4   Differential Method  Automated Automated Automated       Metabolic Panel (last 72 hours):  Recent Labs   Lab Result Units 06/23/22  1519 06/24/22  0330 06/24/22  1150 06/24/22  1518 06/24/22  1703 06/24/22  2329 06/25/22  0248 06/25/22  0615 06/25/22  1620 06/26/22  0303   Sodium mmol/L 129* 129* 130* 132* 128* 133* 134* 131* 135* 134*   Potassium mmol/L 3.8 4.1  --  3.7  --   --  4.0  --  3.9 3.5   Chloride mmol/L 88* 87*  --  89*  --   --  91*  --  92* 91*   CO2 mmol/L 28 30*  --  30*  --   --  31*  --  27 27   Glucose mg/dL 347* 349*  --  241*  --   --  263*  --  133* 197*   BUN mg/dL 36* 38*  --  30*  --   --  35*  --  35* 40*   Creatinine mg/dL 1.9* 1.9*  --  1.6*  --   --  1.8*  --  1.8* 1.8*   Albumin g/dL  --  3.4*  --   --   --   --  3.6  --   --  3.4*   Total Bilirubin mg/dL  --  1.0  --   --   --   --  1.0  --   --  1.1*   Alkaline Phosphatase U/L  --  131  --   --   --   --  156*  --   --  155*   AST U/L  --  22  --   --   --   --  34  --   --  30   ALT U/L  --  19  --   --   --   --  30  --   --  29   Magnesium mg/dL  --  2.2  --   --   --   --  2.3  --   --  2.3       Vancomycin Administrations:  vancomycin given in the last 96 hours                   vancomycin in dextrose 5 % 1 gram/250 mL IVPB 1,000 mg (mg) 1,000 mg New Bag 06/26/22 0857     1,000 mg New Bag 06/25/22 2116    vancomycin in dextrose 5 % 1 gram/250 mL IVPB 1,000 mg (mg) 1,000 mg New Bag 06/25/22 0306     1,000 mg New Bag 06/24/22 1500    vancomycin in dextrose 5 % 1 gram/250  mL IVPB 1,000 mg (mg) 1,000 mg New Bag 06/24/22 0212     1,000 mg New Bag 06/23/22 1318     1,000 mg New Bag  0114    vancomycin 1.5 g in dextrose 5 % 250 mL IVPB (ready to mix) (mg) 1,500 mg New Bag 06/22/22 1409                Microbiologic Results:  Microbiology Results (last 7 days)     Procedure Component Value Units Date/Time    Blood culture [[id number]]  (Abnormal) Collected: 06/23/22 0108    Order Status: Completed Specimen: Blood from Peripheral, Hand, Right Updated: 06/26/22 1013     Blood Culture, Routine Gram stain dudley bottle: Gram positive cocci in clusters resembling Staph       Results called to and read back by: Mark Pickett RN  06/24/2022        14:36      STAPHYLOCOCCUS EPIDERMIDIS  Susceptibility pending      Blood culture [[id number]] Collected: 06/25/22 0634    Order Status: Completed Specimen: Blood from Peripheral, Wrist, Left Updated: 06/26/22 0812     Blood Culture, Routine No Growth to date      No Growth to date    Blood culture [[id number]] Collected: 06/25/22 0633    Order Status: Completed Specimen: Blood from Peripheral, Antecubital, Right Updated: 06/26/22 0812     Blood Culture, Routine No Growth to date      No Growth to date    Blood culture [[id number]] Collected: 06/23/22 0105    Order Status: Completed Specimen: Blood from Peripheral, Antecubital, Right Updated: 06/26/22 0612     Blood Culture, Routine No Growth to date      No Growth to date      No Growth to date      No Growth to date    Blood culture [[id number]]  (Abnormal) Collected: 06/21/22 2250    Order Status: Completed Specimen: Blood from Peripheral, Wrist, Left Updated: 06/25/22 1051     Blood Culture, Routine Gram stain dudley bottle: Gram positive cocci in clusters resembling Staph      Results called to and read back by: Rosa Pardo RN. 06/22/2022  23:29      Gram stain aer bottle: Gram positive cocci in clusters resembling Staph       Positive results previously called 06/23/2022  04:33      STAPHYLOCOCCUS  EPIDERMIDIS  Susceptibility pending      Blood culture [[id number]]  (Abnormal)  (Susceptibility) Collected: 06/21/22 2250    Order Status: Completed Specimen: Blood from Peripheral, Wrist, Left Updated: 06/25/22 1029     Blood Culture, Routine Gram stain dudley bottle: Gram positive cocci      Results called to and read back by: Anrulfo Herrera RN. 06/22/2022  17:46      Gram stain aer bottle: Gram positive cocci in clusters resembling Staph       Positive results previously called 06/23/2022  00:11      STAPHYLOCOCCUS EPIDERMIDIS    Blood culture [[id number]]  (Abnormal)  (Susceptibility) Collected: 06/20/22 1651    Order Status: Completed Specimen: Blood from Peripheral, Hand, Left Updated: 06/25/22 1023     Blood Culture, Routine Gram stain dudley bottle: Gram positive cocci in clusters resembling Staph      Results called to and read back by:Tara López RN 06/21/2022  22:16      STAPHYLOCOCCUS EPIDERMIDIS    Blood culture [[id number]]     Order Status: Canceled Specimen: Blood

## 2022-06-26 NOTE — ASSESSMENT & PLAN NOTE
A 37-year-old male with history of NIDCM with BiV systolic heart failure on home milrinone presented 6/13/2022 for ADHF, IABP placed 6/13/2022, now with Staph epi bacteremia, vancomycin initiated 6/22/2022. IABP and IJ line exchanged on 6/23/2022. Repeat blood cultures from 6/23/2022 positive - but this was prior to IABL and LIJ exchange.    Afebrile and without leukocytosis. Repeat blood cultures 6/25 remain NGTD.    Recommendations:  - Continue vancomycin IV, goal 15-20  - Will follow up cultures from 6/25.  - If blood cultures 6/25 become positive then consider GUILLERMO to evaluate for ICD endocarditis  - Hold off on LVAD implantation at this time

## 2022-06-26 NOTE — SUBJECTIVE & OBJECTIVE
"Interval History: "OK". No acute events overnight. Cultures 6/25 remain NGTD.    Review of Systems   Constitutional:  Negative for chills, fatigue and fever.   HENT:  Negative for ear pain, mouth sores, nosebleeds, postnasal drip, rhinorrhea, sinus pressure, sore throat, tinnitus, trouble swallowing and voice change.    Eyes:  Negative for photophobia, pain, redness and visual disturbance.   Respiratory:  Negative for apnea, cough, chest tightness, shortness of breath and wheezing.    Cardiovascular:  Negative for chest pain, palpitations and leg swelling.   Gastrointestinal:  Negative for abdominal pain, blood in stool, constipation, diarrhea, nausea and vomiting.   Endocrine: Negative for cold intolerance, heat intolerance, polydipsia and polyuria.   Genitourinary:  Negative for decreased urine volume, difficulty urinating, dysuria, flank pain, frequency, genital sores, hematuria, penile discharge, penile pain, penile swelling, scrotal swelling, testicular pain and urgency.   Musculoskeletal:  Negative for arthralgias, back pain, joint swelling, myalgias and neck pain.   Skin:  Negative for color change and rash.   Allergic/Immunologic: Negative for environmental allergies and food allergies.   Neurological:  Negative for dizziness, seizures, syncope, weakness, light-headedness, numbness and headaches.   Hematological:  Negative for adenopathy. Does not bruise/bleed easily.   Psychiatric/Behavioral:  Negative for agitation, confusion, decreased concentration, hallucinations, self-injury, sleep disturbance and suicidal ideas. The patient is not nervous/anxious.    Objective:     Vital Signs (Most Recent):  Temp: 98 °F (36.7 °C) (06/25/22 1500)  Pulse: (!) 117 (06/25/22 1950)  Resp: (!) 45 (06/25/22 1950)  BP: 131/68 (06/25/22 1800)  SpO2: 100 % (06/25/22 1950)   Vital Signs (24h Range):  Temp:  [97.9 °F (36.6 °C)-98.4 °F (36.9 °C)] 98 °F (36.7 °C)  Pulse:  [100-117] 117  Resp:  [8-45] 45  SpO2:  [82 %-100 %] 100 " %  BP: (102-141)/(68-96) 131/68     Weight: 95.3 kg (210 lb 1.6 oz)  Body mass index is 26.26 kg/m².    Estimated Creatinine Clearance: 67.2 mL/min (A) (based on SCr of 1.8 mg/dL (H)).    Physical Exam  Vitals reviewed.   Constitutional:       Appearance: He is well-developed.   HENT:      Head: Normocephalic and atraumatic.      Right Ear: External ear normal.      Left Ear: External ear normal.   Eyes:      Conjunctiva/sclera: Conjunctivae normal.   Neck:      Thyroid: No thyromegaly.   Cardiovascular:      Rate and Rhythm: Normal rate and regular rhythm.      Heart sounds: Normal heart sounds. No murmur heard.     Comments: Balloon pump sounds.   Pulmonary:      Effort: Pulmonary effort is normal.      Breath sounds: Normal breath sounds. No wheezing or rales.   Abdominal:      General: Bowel sounds are normal.      Palpations: Abdomen is soft. There is no mass.      Tenderness: There is no abdominal tenderness. There is no rebound.   Musculoskeletal:         General: Normal range of motion.      Cervical back: Normal range of motion and neck supple.   Lymphadenopathy:      Cervical: No cervical adenopathy.   Skin:     General: Skin is warm and dry.   Neurological:      Mental Status: He is alert and oriented to person, place, and time.   Psychiatric:         Behavior: Behavior normal.       Significant Labs: Blood Culture:   Recent Labs   Lab 06/21/22  2250 06/23/22  0105 06/23/22  0108 06/25/22  0633 06/25/22  0634   LABBLOO Gram stain dudley bottle: Gram positive cocci in clusters resembling Staph  Results called to and read back by: Rosa Pardo RN. 06/22/2022  23:29  Gram stain aer bottle: Gram positive cocci in clusters resembling Staph   Positive results previously called 06/23/2022  04:33  STAPHYLOCOCCUS EPIDERMIDIS  Susceptibility pending  *  Gram stain dudley bottle: Gram positive cocci  Results called to and read back by: Arnulfo Herrera RN. 06/22/2022  17:46  Gram stain aer bottle: Gram positive cocci  in clusters resembling Staph   Positive results previously called 06/23/2022  00:11  STAPHYLOCOCCUS EPIDERMIDIS* No Growth to date  No Growth to date  No Growth to date Gram stain dudley bottle: Gram positive cocci in clusters resembling Staph   Results called to and read back by: Mark Pickett RN  06/24/2022    14:36  STAPHYLOCOCCUS EPIDERMIDIS  Susceptibility pending  * No Growth to date No Growth to date     BMP:   Recent Labs   Lab 06/25/22  0248 06/25/22  0615 06/25/22  1620   *  --  133*   *   < > 135*   K 4.0  --  3.9   CL 91*  --  92*   CO2 31*  --  27   BUN 35*  --  35*   CREATININE 1.8*  --  1.8*   CALCIUM 10.4  --  10.7*   MG 2.3  --   --     < > = values in this interval not displayed.     CBC:   Recent Labs   Lab 06/24/22  0330 06/25/22  0248   WBC 10.24 12.39   HGB 9.6* 10.5*   HCT 29.5* 32.5*    445     Urine Culture: No results for input(s): LABURIN in the last 4320 hours.  Urine Studies:   Recent Labs   Lab 04/18/22  1424 05/20/22  1229   COLORU Straw  --    APPEARANCEUA Clear Clear   PHUR 7.0  --    SPECGRAV 1.010  --    PROTEINUA Negative Negative   GLUCUA Negative  --    KETONESU Negative  --    BILIRUBINUA Negative Negative   OCCULTUA 1+*  --    NITRITE Negative  --    UROBILINOGEN  --  0.2   LEUKOCYTESUR Negative Negative   RBCUA 5* <5   WBCUA 0 <5   BACTERIA  --  None Seen     Wound Culture: No results for input(s): LABAERO in the last 4320 hours.    Significant Imaging: I have reviewed all pertinent imaging results/findings within the past 24 hours.

## 2022-06-27 ENCOUNTER — ANESTHESIA (OUTPATIENT)
Dept: INTENSIVE CARE | Facility: HOSPITAL | Age: 37
DRG: 001 | End: 2022-06-27
Payer: MEDICAID

## 2022-06-27 ENCOUNTER — ANESTHESIA EVENT (OUTPATIENT)
Dept: INTENSIVE CARE | Facility: HOSPITAL | Age: 37
DRG: 001 | End: 2022-06-27
Payer: MEDICAID

## 2022-06-27 LAB
ALBUMIN SERPL BCP-MCNC: 3.6 G/DL (ref 3.5–5.2)
ALLENS TEST: ABNORMAL
ALLENS TEST: NORMAL
ALP SERPL-CCNC: 151 U/L (ref 55–135)
ALT SERPL W/O P-5'-P-CCNC: 27 U/L (ref 10–44)
ANION GAP SERPL CALC-SCNC: 14 MMOL/L (ref 8–16)
ANION GAP SERPL CALC-SCNC: 20 MMOL/L (ref 8–16)
APTT BLDCRRT: 48 SEC (ref 21–32)
APTT BLDCRRT: 49.1 SEC (ref 21–32)
APTT BLDCRRT: 56.3 SEC (ref 21–32)
AST SERPL-CCNC: 28 U/L (ref 10–40)
BASOPHILS # BLD AUTO: 0.07 K/UL (ref 0–0.2)
BASOPHILS NFR BLD: 0.5 % (ref 0–1.9)
BILIRUB SERPL-MCNC: 0.9 MG/DL (ref 0.1–1)
BUN SERPL-MCNC: 51 MG/DL (ref 6–20)
BUN SERPL-MCNC: 55 MG/DL (ref 6–20)
CA-I BLDV-SCNC: 1.23 MMOL/L (ref 1.06–1.42)
CALCIUM SERPL-MCNC: 10.1 MG/DL (ref 8.7–10.5)
CALCIUM SERPL-MCNC: 10.4 MG/DL (ref 8.7–10.5)
CHLORIDE SERPL-SCNC: 89 MMOL/L (ref 95–110)
CHLORIDE SERPL-SCNC: 92 MMOL/L (ref 95–110)
CO2 SERPL-SCNC: 21 MMOL/L (ref 23–29)
CO2 SERPL-SCNC: 27 MMOL/L (ref 23–29)
CREAT SERPL-MCNC: 1.9 MG/DL (ref 0.5–1.4)
CREAT SERPL-MCNC: 2.1 MG/DL (ref 0.5–1.4)
DELSYS: ABNORMAL
DIFFERENTIAL METHOD: ABNORMAL
EOSINOPHIL # BLD AUTO: 0.1 K/UL (ref 0–0.5)
EOSINOPHIL NFR BLD: 0.9 % (ref 0–8)
ERYTHROCYTE [DISTWIDTH] IN BLOOD BY AUTOMATED COUNT: 14.4 % (ref 11.5–14.5)
EST. GFR  (AFRICAN AMERICAN): 45.1 ML/MIN/1.73 M^2
EST. GFR  (AFRICAN AMERICAN): 50.9 ML/MIN/1.73 M^2
EST. GFR  (NON AFRICAN AMERICAN): 39 ML/MIN/1.73 M^2
EST. GFR  (NON AFRICAN AMERICAN): 44.1 ML/MIN/1.73 M^2
FLOW: 4
GLUCOSE SERPL-MCNC: 142 MG/DL (ref 70–110)
GLUCOSE SERPL-MCNC: 255 MG/DL (ref 70–110)
HCO3 UR-SCNC: 26.5 MMOL/L (ref 24–28)
HCO3 UR-SCNC: 30.3 MMOL/L (ref 24–28)
HCO3 UR-SCNC: 30.7 MMOL/L (ref 24–28)
HCO3 UR-SCNC: 31 MMOL/L (ref 24–28)
HCO3 UR-SCNC: 31.2 MMOL/L (ref 24–28)
HCO3 UR-SCNC: 31.4 MMOL/L (ref 24–28)
HCO3 UR-SCNC: 32.1 MMOL/L (ref 24–28)
HCT VFR BLD AUTO: 30.5 % (ref 40–54)
HGB BLD-MCNC: 10.3 G/DL (ref 14–18)
IMM GRANULOCYTES # BLD AUTO: 0.07 K/UL (ref 0–0.04)
IMM GRANULOCYTES NFR BLD AUTO: 0.5 % (ref 0–0.5)
LACTATE SERPL-SCNC: 0.9 MMOL/L (ref 0.5–2.2)
LDH SERPL L TO P-CCNC: 1.09 MMOL/L (ref 0.36–1.25)
LDH SERPL L TO P-CCNC: 350 U/L (ref 110–260)
LYMPHOCYTES # BLD AUTO: 2.7 K/UL (ref 1–4.8)
LYMPHOCYTES NFR BLD: 19.4 % (ref 18–48)
MAGNESIUM SERPL-MCNC: 2.4 MG/DL (ref 1.6–2.6)
MCH RBC QN AUTO: 29.9 PG (ref 27–31)
MCHC RBC AUTO-ENTMCNC: 33.8 G/DL (ref 32–36)
MCV RBC AUTO: 88 FL (ref 82–98)
METHEMOGLOBIN: 0.6 % (ref 0–3)
MODE: ABNORMAL
MONOCYTES # BLD AUTO: 0.8 K/UL (ref 0.3–1)
MONOCYTES NFR BLD: 5.7 % (ref 4–15)
NEUTROPHILS # BLD AUTO: 10.3 K/UL (ref 1.8–7.7)
NEUTROPHILS NFR BLD: 73 % (ref 38–73)
NRBC BLD-RTO: 0 /100 WBC
PCO2 BLDA: 42.7 MMHG (ref 35–45)
PCO2 BLDA: 51.5 MMHG (ref 35–45)
PCO2 BLDA: 52.8 MMHG (ref 35–45)
PCO2 BLDA: 53.6 MMHG (ref 35–45)
PCO2 BLDA: 54.8 MMHG (ref 35–45)
PCO2 BLDA: 56.8 MMHG (ref 35–45)
PCO2 BLDA: 56.8 MMHG (ref 35–45)
PH SMN: 7.35 [PH] (ref 7.35–7.45)
PH SMN: 7.36 [PH] (ref 7.35–7.45)
PH SMN: 7.36 [PH] (ref 7.35–7.45)
PH SMN: 7.37 [PH] (ref 7.35–7.45)
PH SMN: 7.37 [PH] (ref 7.35–7.45)
PH SMN: 7.39 [PH] (ref 7.35–7.45)
PH SMN: 7.4 [PH] (ref 7.35–7.45)
PLATELET # BLD AUTO: 451 K/UL (ref 150–450)
PMV BLD AUTO: 9.8 FL (ref 9.2–12.9)
PO2 BLDA: 110 MMHG (ref 80–100)
PO2 BLDA: 20 MMHG (ref 40–60)
PO2 BLDA: 21 MMHG (ref 40–60)
PO2 BLDA: 24 MMHG (ref 40–60)
PO2 BLDA: 29 MMHG (ref 40–60)
PO2 BLDA: 32 MMHG (ref 40–60)
PO2 BLDA: 34 MMHG (ref 40–60)
POC BE: 2 MMOL/L
POC BE: 5 MMOL/L
POC BE: 5 MMOL/L
POC BE: 6 MMOL/L
POC BE: 7 MMOL/L
POC SATURATED O2: 28 % (ref 95–100)
POC SATURATED O2: 30 % (ref 95–100)
POC SATURATED O2: 38 % (ref 95–100)
POC SATURATED O2: 51 % (ref 95–100)
POC SATURATED O2: 60 % (ref 95–100)
POC SATURATED O2: 62 % (ref 95–100)
POC SATURATED O2: 98 % (ref 95–100)
POC TCO2: 28 MMOL/L (ref 23–27)
POC TCO2: 32 MMOL/L (ref 24–29)
POC TCO2: 32 MMOL/L (ref 24–29)
POC TCO2: 33 MMOL/L (ref 24–29)
POC TCO2: 34 MMOL/L (ref 24–29)
POCT GLUCOSE: 168 MG/DL (ref 70–110)
POCT GLUCOSE: 179 MG/DL (ref 70–110)
POCT GLUCOSE: 189 MG/DL (ref 70–110)
POCT GLUCOSE: 195 MG/DL (ref 70–110)
POCT GLUCOSE: 207 MG/DL (ref 70–110)
POCT GLUCOSE: 222 MG/DL (ref 70–110)
POCT GLUCOSE: 286 MG/DL (ref 70–110)
POTASSIUM SERPL-SCNC: 3.6 MMOL/L (ref 3.5–5.1)
POTASSIUM SERPL-SCNC: 4.5 MMOL/L (ref 3.5–5.1)
PROT SERPL-MCNC: 8.6 G/DL (ref 6–8.4)
RBC # BLD AUTO: 3.45 M/UL (ref 4.6–6.2)
SAMPLE: ABNORMAL
SAMPLE: NORMAL
SITE: ABNORMAL
SITE: NORMAL
SODIUM SERPL-SCNC: 130 MMOL/L (ref 136–145)
SODIUM SERPL-SCNC: 133 MMOL/L (ref 136–145)
SP02: 100
SP02: 94
SP02: 95
VANCOMYCIN TROUGH SERPL-MCNC: 21.3 UG/ML (ref 10–22)
WBC # BLD AUTO: 14.14 K/UL (ref 3.9–12.7)

## 2022-06-27 PROCEDURE — 25000003 PHARM REV CODE 250: Mod: NTX | Performed by: HOSPITALIST

## 2022-06-27 PROCEDURE — 99292 PR CRITICAL CARE, ADDL 30 MIN: ICD-10-PCS | Mod: NTX,,, | Performed by: INTERNAL MEDICINE

## 2022-06-27 PROCEDURE — 99232 PR SUBSEQUENT HOSPITAL CARE,LEVL II: ICD-10-PCS | Mod: NTX,,, | Performed by: NURSE PRACTITIONER

## 2022-06-27 PROCEDURE — 99291 PR CRITICAL CARE, E/M 30-74 MINUTES: ICD-10-PCS | Mod: NTX,,, | Performed by: PHYSICIAN ASSISTANT

## 2022-06-27 PROCEDURE — 82803 BLOOD GASES ANY COMBINATION: CPT | Mod: NTX

## 2022-06-27 PROCEDURE — 99900035 HC TECH TIME PER 15 MIN (STAT): Mod: NTX

## 2022-06-27 PROCEDURE — 85730 THROMBOPLASTIN TIME PARTIAL: CPT | Mod: 91,NTX | Performed by: INTERNAL MEDICINE

## 2022-06-27 PROCEDURE — 25000003 PHARM REV CODE 250: Mod: NTX | Performed by: INTERNAL MEDICINE

## 2022-06-27 PROCEDURE — 99232 SBSQ HOSP IP/OBS MODERATE 35: CPT | Mod: NTX,,, | Performed by: INTERNAL MEDICINE

## 2022-06-27 PROCEDURE — 27000221 HC OXYGEN, UP TO 24 HOURS: Mod: NTX

## 2022-06-27 PROCEDURE — 63600367 HC NITRIC OXIDE PER HOUR: Mod: NTX

## 2022-06-27 PROCEDURE — 82330 ASSAY OF CALCIUM: CPT | Mod: NTX | Performed by: PHYSICIAN ASSISTANT

## 2022-06-27 PROCEDURE — 99292 CRITICAL CARE ADDL 30 MIN: CPT | Mod: NTX,,, | Performed by: INTERNAL MEDICINE

## 2022-06-27 PROCEDURE — 99291 CRITICAL CARE FIRST HOUR: CPT | Mod: NTX,,, | Performed by: PHYSICIAN ASSISTANT

## 2022-06-27 PROCEDURE — 83605 ASSAY OF LACTIC ACID: CPT | Mod: NTX

## 2022-06-27 PROCEDURE — 63600175 PHARM REV CODE 636 W HCPCS: Mod: NTX | Performed by: NURSE PRACTITIONER

## 2022-06-27 PROCEDURE — 27000202 HC IABP, ADD'L DAY: Mod: NTX

## 2022-06-27 PROCEDURE — 85025 COMPLETE CBC W/AUTO DIFF WBC: CPT | Mod: NTX | Performed by: INTERNAL MEDICINE

## 2022-06-27 PROCEDURE — 94761 N-INVAS EAR/PLS OXIMETRY MLT: CPT | Mod: NTX

## 2022-06-27 PROCEDURE — 63600175 PHARM REV CODE 636 W HCPCS: Mod: NTX | Performed by: PHYSICIAN ASSISTANT

## 2022-06-27 PROCEDURE — 80048 BASIC METABOLIC PNL TOTAL CA: CPT | Mod: NTX,XB | Performed by: INTERNAL MEDICINE

## 2022-06-27 PROCEDURE — 85730 THROMBOPLASTIN TIME PARTIAL: CPT | Mod: NTX | Performed by: INTERNAL MEDICINE

## 2022-06-27 PROCEDURE — 99232 SBSQ HOSP IP/OBS MODERATE 35: CPT | Mod: NTX,,, | Performed by: NURSE PRACTITIONER

## 2022-06-27 PROCEDURE — 63600175 PHARM REV CODE 636 W HCPCS: Mod: NTX | Performed by: STUDENT IN AN ORGANIZED HEALTH CARE EDUCATION/TRAINING PROGRAM

## 2022-06-27 PROCEDURE — 80202 ASSAY OF VANCOMYCIN: CPT | Mod: NTX | Performed by: INTERNAL MEDICINE

## 2022-06-27 PROCEDURE — 20000000 HC ICU ROOM: Mod: NTX

## 2022-06-27 PROCEDURE — 25000003 PHARM REV CODE 250: Mod: NTX | Performed by: NURSE PRACTITIONER

## 2022-06-27 PROCEDURE — 63600175 PHARM REV CODE 636 W HCPCS: Mod: NTX | Performed by: INTERNAL MEDICINE

## 2022-06-27 PROCEDURE — 83615 LACTATE (LD) (LDH) ENZYME: CPT | Mod: NTX | Performed by: PHYSICIAN ASSISTANT

## 2022-06-27 PROCEDURE — 25000003 PHARM REV CODE 250: Mod: NTX | Performed by: PHYSICIAN ASSISTANT

## 2022-06-27 PROCEDURE — 80053 COMPREHEN METABOLIC PANEL: CPT | Mod: NTX | Performed by: NURSE PRACTITIONER

## 2022-06-27 PROCEDURE — 36600 WITHDRAWAL OF ARTERIAL BLOOD: CPT | Mod: NTX

## 2022-06-27 PROCEDURE — 99232 PR SUBSEQUENT HOSPITAL CARE,LEVL II: ICD-10-PCS | Mod: NTX,,, | Performed by: INTERNAL MEDICINE

## 2022-06-27 PROCEDURE — 25000003 PHARM REV CODE 250: Mod: NTX | Performed by: STUDENT IN AN ORGANIZED HEALTH CARE EDUCATION/TRAINING PROGRAM

## 2022-06-27 PROCEDURE — 83605 ASSAY OF LACTIC ACID: CPT | Mod: NTX | Performed by: PHYSICIAN ASSISTANT

## 2022-06-27 PROCEDURE — 83050 HGB METHEMOGLOBIN QUAN: CPT | Mod: NTX

## 2022-06-27 PROCEDURE — 83735 ASSAY OF MAGNESIUM: CPT | Mod: NTX | Performed by: INTERNAL MEDICINE

## 2022-06-27 RX ORDER — POTASSIUM CHLORIDE 750 MG/1
30 CAPSULE, EXTENDED RELEASE ORAL ONCE
Status: COMPLETED | OUTPATIENT
Start: 2022-06-27 | End: 2022-06-27

## 2022-06-27 RX ORDER — POTASSIUM CHLORIDE 750 MG/1
10 CAPSULE, EXTENDED RELEASE ORAL ONCE
Status: COMPLETED | OUTPATIENT
Start: 2022-06-27 | End: 2022-06-27

## 2022-06-27 RX ADMIN — INSULIN ASPART 4 UNITS: 100 INJECTION, SOLUTION INTRAVENOUS; SUBCUTANEOUS at 08:06

## 2022-06-27 RX ADMIN — INSULIN HUMAN 1.5 UNITS/HR: 1 INJECTION, SOLUTION INTRAVENOUS at 11:06

## 2022-06-27 RX ADMIN — HEPARIN SODIUM 22 UNITS/KG/HR: 5000 INJECTION INTRAVENOUS; SUBCUTANEOUS at 01:06

## 2022-06-27 RX ADMIN — INSULIN ASPART 12 UNITS: 100 INJECTION, SOLUTION INTRAVENOUS; SUBCUTANEOUS at 11:06

## 2022-06-27 RX ADMIN — HYDROCODONE BITARTRATE AND ACETAMINOPHEN 1 TABLET: 5; 325 TABLET ORAL at 04:06

## 2022-06-27 RX ADMIN — POTASSIUM CHLORIDE 30 MEQ: 10 CAPSULE, COATED, EXTENDED RELEASE ORAL at 09:06

## 2022-06-27 RX ADMIN — POTASSIUM CHLORIDE 30 MEQ: 10 CAPSULE, COATED, EXTENDED RELEASE ORAL at 06:06

## 2022-06-27 RX ADMIN — INSULIN ASPART 2 UNITS: 100 INJECTION, SOLUTION INTRAVENOUS; SUBCUTANEOUS at 11:06

## 2022-06-27 RX ADMIN — HYDROCODONE BITARTRATE AND ACETAMINOPHEN 1 TABLET: 5; 325 TABLET ORAL at 06:06

## 2022-06-27 RX ADMIN — ACETAMINOPHEN 650 MG: 325 TABLET ORAL at 10:06

## 2022-06-27 RX ADMIN — FUROSEMIDE 20 MG/HR: 10 INJECTION, SOLUTION INTRAMUSCULAR; INTRAVENOUS at 08:06

## 2022-06-27 RX ADMIN — VANCOMYCIN HYDROCHLORIDE 1000 MG: 1 INJECTION, POWDER, LYOPHILIZED, FOR SOLUTION INTRAVENOUS at 10:06

## 2022-06-27 RX ADMIN — PANTOPRAZOLE SODIUM 40 MG: 40 TABLET, DELAYED RELEASE ORAL at 09:06

## 2022-06-27 RX ADMIN — INSULIN ASPART 12 UNITS: 100 INJECTION, SOLUTION INTRAVENOUS; SUBCUTANEOUS at 09:06

## 2022-06-27 RX ADMIN — BUSPIRONE HYDROCHLORIDE 7.5 MG: 5 TABLET ORAL at 09:06

## 2022-06-27 RX ADMIN — POLYETHYLENE GLYCOL 3350 17 G: 17 POWDER, FOR SOLUTION ORAL at 09:06

## 2022-06-27 RX ADMIN — POTASSIUM CHLORIDE 30 MEQ: 10 CAPSULE, COATED, EXTENDED RELEASE ORAL at 08:06

## 2022-06-27 RX ADMIN — ACETAMINOPHEN 650 MG: 325 TABLET ORAL at 04:06

## 2022-06-27 RX ADMIN — MILRINONE LACTATE IN DEXTROSE 0.38 MCG/KG/MIN: 200 INJECTION, SOLUTION INTRAVENOUS at 08:06

## 2022-06-27 RX ADMIN — MIRTAZAPINE 15 MG: 15 TABLET, FILM COATED ORAL at 08:06

## 2022-06-27 RX ADMIN — METHOCARBAMOL TABLETS 500 MG: 500 TABLET, COATED ORAL at 08:06

## 2022-06-27 RX ADMIN — SUCRALFATE 1 G: 1 TABLET ORAL at 08:06

## 2022-06-27 RX ADMIN — EPINEPHRINE 0.04 MCG/KG/MIN: 1 INJECTION INTRAMUSCULAR; INTRAVENOUS; SUBCUTANEOUS at 03:06

## 2022-06-27 RX ADMIN — GABAPENTIN 300 MG: 300 CAPSULE ORAL at 04:06

## 2022-06-27 RX ADMIN — GABAPENTIN 300 MG: 300 CAPSULE ORAL at 08:06

## 2022-06-27 RX ADMIN — HEPARIN SODIUM 24 UNITS/KG/HR: 5000 INJECTION INTRAVENOUS; SUBCUTANEOUS at 12:06

## 2022-06-27 RX ADMIN — INSULIN ASPART 12 UNITS: 100 INJECTION, SOLUTION INTRAVENOUS; SUBCUTANEOUS at 04:06

## 2022-06-27 RX ADMIN — BISACODYL 10 MG: 10 SUPPOSITORY RECTAL at 03:06

## 2022-06-27 RX ADMIN — METHOCARBAMOL TABLETS 500 MG: 500 TABLET, COATED ORAL at 09:06

## 2022-06-27 RX ADMIN — POTASSIUM CHLORIDE 10 MEQ: 10 CAPSULE, COATED, EXTENDED RELEASE ORAL at 06:06

## 2022-06-27 RX ADMIN — METHOCARBAMOL TABLETS 500 MG: 500 TABLET, COATED ORAL at 04:06

## 2022-06-27 RX ADMIN — HYDROCODONE BITARTRATE AND ACETAMINOPHEN 1 TABLET: 5; 325 TABLET ORAL at 10:06

## 2022-06-27 RX ADMIN — MILRINONE LACTATE IN DEXTROSE 0.38 MCG/KG/MIN: 200 INJECTION, SOLUTION INTRAVENOUS at 12:06

## 2022-06-27 RX ADMIN — ASPIRIN 81 MG CHEWABLE TABLET 81 MG: 81 TABLET CHEWABLE at 09:06

## 2022-06-27 RX ADMIN — INSULIN ASPART 2 UNITS: 100 INJECTION, SOLUTION INTRAVENOUS; SUBCUTANEOUS at 09:06

## 2022-06-27 RX ADMIN — SODIUM CHLORIDE: 9 INJECTION, SOLUTION INTRAVENOUS at 12:06

## 2022-06-27 RX ADMIN — ALPRAZOLAM 0.25 MG: 0.25 TABLET ORAL at 12:06

## 2022-06-27 RX ADMIN — INSULIN ASPART 2 UNITS: 100 INJECTION, SOLUTION INTRAVENOUS; SUBCUTANEOUS at 04:06

## 2022-06-27 RX ADMIN — GABAPENTIN 300 MG: 300 CAPSULE ORAL at 09:06

## 2022-06-27 RX ADMIN — SPIRONOLACTONE 25 MG: 25 TABLET, FILM COATED ORAL at 09:06

## 2022-06-27 RX ADMIN — SENNOSIDES AND DOCUSATE SODIUM 1 TABLET: 50; 8.6 TABLET ORAL at 09:06

## 2022-06-27 RX ADMIN — BUSPIRONE HYDROCHLORIDE 7.5 MG: 5 TABLET ORAL at 08:06

## 2022-06-27 RX ADMIN — INSULIN ASPART 6 UNITS: 100 INJECTION, SOLUTION INTRAVENOUS; SUBCUTANEOUS at 04:06

## 2022-06-27 RX ADMIN — INSULIN ASPART 4 UNITS: 100 INJECTION, SOLUTION INTRAVENOUS; SUBCUTANEOUS at 01:06

## 2022-06-27 RX ADMIN — METHOCARBAMOL TABLETS 500 MG: 500 TABLET, COATED ORAL at 12:06

## 2022-06-27 RX ADMIN — ALPRAZOLAM 0.25 MG: 0.25 TABLET ORAL at 10:06

## 2022-06-27 NOTE — PROGRESS NOTES
Diabetic education provided 6/27/22    Visual aids reflecting meal planning using the plate method were provided highlighting carbohydrates servings for meal planning. Pt was asked to list 3 foods that contain carbohydrates, pt answered question correctly. RD explained importance of following a diabetic diet and if not followed can lead to additional co-morbidities and delay/cancellation of VAD implant date. Handouts provided. All questions answered.    RD will f/u  to make sure any additional questions regarding diabetic diet are addressed.    Please consult as needed.    Thanks!

## 2022-06-27 NOTE — ASSESSMENT & PLAN NOTE
-Blood cultures 6/20 and repeat 6/21 positive for Staph Epi. One of two blood cultures from 6/23 positive for Staph (taken prior to line exchange). Repeat cultures sent 6/25 NGTD.   -IJ exchanged on 6/23, IABP exchanged 6/23  -Continue Empiric Vanc 6/22  -ID consulted given upcoming planned LVAD, continue Vanc. Will need ID clearance prior to LVAD implant

## 2022-06-27 NOTE — ASSESSMENT & PLAN NOTE
Endocrinology consulted for BG management.   BG goal 140-180    - Transition drip at 1.5 units/hr with step-down parameters   - Novolog 8-12 units TIDWM (basal/prandial matching)  - Novolog (aspart) insulin prn for BG excursions [facility] (150/25).   - BG checks q4hr  - Hypoglycemia protocol in place  - If blood glucose greater than 300, please ask patient not to eat food or drink anything other than water until correctional insulin has brought it back below 250    - Will likely require IIP s/p LVAD    - Provided bedside diabetes education.    ** Please notify Endocrine for any change and/or advance in diet**  ** Please call Endocrine for any BG related issues **    Discharge Planning:   TBD. Please notify endocrinology prior to discharge.

## 2022-06-27 NOTE — SUBJECTIVE & OBJECTIVE
Interval History: No complaints. IABP placement adjusted yesterday with Interventional Cardiology. CVP 9, SVO2 51%, CO 5.0, CI 2.2, SVR 1302 with IABP 1:1, epi 0.04, milrinone 0.375, Marcella 10 ppm. Cr bumped from 1.8 to 2.1. Decreased IV Lasix gtt from 20 to 10 mg/hr. Continue aldactone 25 QD, may consider discontinuing tomorrow if Cr remains elevated. Blood cultures 6/25 NGTD. Will need ID clearance for LVAD implant on 6/29/22.         Lines:  IABP 6/23  Right IJ 6/23    Continuous Infusions:   sodium chloride 0.9% 5 mL/hr at 06/27/22 0055    EPINEPHrine 0.04 mcg/kg/min (06/27/22 1100)    furosemide (LASIX) 10 mg/mL infusion (non-titrating) 10 mg/hr (06/27/22 1100)    heparin (porcine) in D5W 22 Units/kg/hr (06/27/22 1100)    insulin regular 1 units/mL infusion orderable (TRANSFER) 1.2 Units/hr (06/27/22 1100)    milrinone 20mg/100ml D5W (200mcg/ml) 0.375 mcg/kg/min (06/27/22 1100)    nitric oxide gas       Scheduled Meds:   acetaminophen  650 mg Oral Q8H    aspirin  81 mg Oral Daily    busPIRone  7.5 mg Oral Q12H    gabapentin  300 mg Oral TID    HYDROcodone-acetaminophen  1 tablet Oral Q8H    insulin aspart U-100  8-12 Units Subcutaneous TIDWM    LIDOcaine HCL 20 mg/ml (2%)  20 mL Other Once    methocarbamoL  500 mg Oral QID    mirtazapine  15 mg Oral Nightly    pantoprazole  40 mg Oral Daily    polyethylene glycol  17 g Oral BID    potassium chloride  30 mEq Oral BID    senna-docusate 8.6-50 mg  1 tablet Oral Daily    spironolactone  25 mg Oral Daily    sucralfate  1 g Oral Nightly    vancomycin (VANCOCIN) IVPB  1,000 mg Intravenous Q12H     PRN Meds:sodium chloride, sodium chloride, sodium chloride, ALPRAZolam, bisacodyL, dextrose 10%, dextrose 10%, diphenhydrAMINE, glucagon (human recombinant), glucose, glucose, insulin aspart U-100, magnesium oxide, magnesium oxide, mupirocin, potassium bicarbonate, potassium bicarbonate, potassium bicarbonate, potassium, sodium phosphates, potassium, sodium phosphates,  potassium, sodium phosphates, promethazine, sodium chloride 0.9%, sodium chloride 0.9%, Pharmacy to dose Vancomycin consult **AND** vancomycin - pharmacy to dose    Review of patient's allergies indicates:   Allergen Reactions    Aspirin Other (See Comments)     Mr. Thacker is enrolled in Dr. Paige's Alon Trial and cannot have any aspirin and/or aspirin-containing products. DO NOT cancel any orders for INV Aspirin 100 mg/Placebo. If you have questions, please contact Isabel @ [phone], [phone],[email or web address], secure chat or MS Teams message.    Bumex [bumetanide] Hives    Lactose Diarrhea     Other reaction(s): Abdominal distension, gaseous    Torsemide Hives     Objective:     Vital Signs (Most Recent):  Temp: 98.3 °F (36.8 °C) (06/27/22 1100)  Pulse: (!) 111 (06/27/22 1100)  Resp: (!) 24 (06/27/22 1100)  BP: 137/82 (06/27/22 1100)  SpO2: (!) 92 % (06/27/22 1100)   Vital Signs (24h Range):  Temp:  [97.2 °F (36.2 °C)-98.6 °F (37 °C)] 98.3 °F (36.8 °C)  Pulse:  [] 111  Resp:  [11-62] 24  SpO2:  [91 %-100 %] 92 %  BP: (103-156)/(63-88) 137/82     Patient Vitals for the past 72 hrs (Last 3 readings):   Weight   06/27/22 0300 93.6 kg (206 lb 5.6 oz)   06/26/22 0300 96.7 kg (213 lb 3 oz)   06/25/22 0300 95.3 kg (210 lb 1.6 oz)       Body mass index is 25.79 kg/m².      Intake/Output Summary (Last 24 hours) at 6/27/2022 1132  Last data filed at 6/27/2022 1100  Gross per 24 hour   Intake 2659.29 ml   Output 4445 ml   Net -1785.71 ml         Hemodynamic Parameters:       Telemetry: ST    Physical Exam  Constitutional:       Appearance: Normal appearance.   HENT:      Head: Normocephalic and atraumatic.   Eyes:      Conjunctiva/sclera: Conjunctivae normal.      Pupils: Pupils are equal, round, and reactive to light.   Neck:      Comments: RIJ TLC  Cardiovascular:      Rate and Rhythm: Regular rhythm. Tachycardia present.      Comments: +S3  Pulmonary:      Effort: Pulmonary effort is normal.      Breath  sounds: Normal breath sounds.   Abdominal:      General: Bowel sounds are normal. There is distension.   Musculoskeletal:         General: No swelling. Normal range of motion.      Cervical back: Neck supple.   Skin:     General: Skin is warm and dry.      Capillary Refill: Capillary refill takes 2 to 3 seconds.   Neurological:      General: No focal deficit present.      Mental Status: He is alert and oriented to person, place, and time.   Psychiatric:         Mood and Affect: Mood normal.         Behavior: Behavior normal.         Thought Content: Thought content normal.         Judgment: Judgment normal.       Significant Labs:  CBC:  Recent Labs   Lab 06/25/22  0248 06/26/22  0303 06/27/22  0308   WBC 12.39 15.11* 14.14*   RBC 3.66* 3.56* 3.45*   HGB 10.5* 10.5* 10.3*   HCT 32.5* 31.1* 30.5*    449 451*   MCV 89 87 88   MCH 28.7 29.5 29.9   MCHC 32.3 33.8 33.8       BNP:  No results for input(s): BNP in the last 168 hours.    Invalid input(s): BNPTRIAGELBLO    CMP:  Recent Labs   Lab 06/25/22  0248 06/25/22  0615 06/26/22  0303 06/26/22  1420 06/27/22  0308   *   < > 197* 102 255*   CALCIUM 10.4   < > 10.3 10.6* 10.4   ALBUMIN 3.6  --  3.4*  --  3.6   PROT 8.6*  --  8.2  --  8.6*   *   < > 134* 133* 130*   K 4.0   < > 3.5 4.5 3.6   CO2 31*   < > 27 28 27   CL 91*   < > 91* 92* 89*   BUN 35*   < > 40* 43* 51*   CREATININE 1.8*   < > 1.8* 1.9* 2.1*   ALKPHOS 156*  --  155*  --  151*   ALT 30  --  29  --  27   AST 34  --  30  --  28   BILITOT 1.0  --  1.1*  --  0.9    < > = values in this interval not displayed.        Coagulation:   Recent Labs   Lab 06/20/22  1607 06/21/22  0418 06/25/22  0248 06/26/22  0303 06/27/22  0308   INR 1.0  --   --   --   --    APTT  --    < > 44.7* 49.3* 56.3*    < > = values in this interval not displayed.       LDH:  Recent Labs   Lab 06/24/22  1150 06/25/22  0248 06/26/22  0303 06/27/22  0308   * 379* 388* 350*       Microbiology:  Microbiology Results  (last 7 days)       Procedure Component Value Units Date/Time    Blood culture [[id number]]  (Abnormal)  (Susceptibility) Collected: 06/23/22 0108    Order Status: Completed Specimen: Blood from Peripheral, Hand, Right Updated: 06/27/22 1116     Blood Culture, Routine Gram stain dudley bottle: Gram positive cocci in clusters resembling Staph       Results called to and read back by: Mark Pickett RN  06/24/2022        14:36      STAPHYLOCOCCUS EPIDERMIDIS    Blood culture [[id number]] Collected: 06/25/22 0634    Order Status: Completed Specimen: Blood from Peripheral, Wrist, Left Updated: 06/27/22 0812     Blood Culture, Routine No Growth to date      No Growth to date      No Growth to date    Blood culture [[id number]] Collected: 06/25/22 0633    Order Status: Completed Specimen: Blood from Peripheral, Antecubital, Right Updated: 06/27/22 0812     Blood Culture, Routine No Growth to date      No Growth to date      No Growth to date    Blood culture [[id number]] Collected: 06/23/22 0105    Order Status: Completed Specimen: Blood from Peripheral, Antecubital, Right Updated: 06/27/22 0612     Blood Culture, Routine No Growth to date      No Growth to date      No Growth to date      No Growth to date      No Growth to date    Blood culture [[id number]]  (Abnormal)  (Susceptibility) Collected: 06/21/22 2250    Order Status: Completed Specimen: Blood from Peripheral, Wrist, Left Updated: 06/26/22 1059     Blood Culture, Routine Gram stain dudley bottle: Gram positive cocci in clusters resembling Staph      Results called to and read back by: Rosa Pardo RN. 06/22/2022  23:29      Gram stain aer bottle: Gram positive cocci in clusters resembling Staph       Positive results previously called 06/23/2022  04:33      STAPHYLOCOCCUS EPIDERMIDIS    Blood culture [[id number]]  (Abnormal)  (Susceptibility) Collected: 06/21/22 2250    Order Status: Completed Specimen: Blood from Peripheral, Wrist, Left Updated: 06/25/22 1029      Blood Culture, Routine Gram stain dudley bottle: Gram positive cocci      Results called to and read back by: Arnulfo Herrera RN. 06/22/2022  17:46      Gram stain aer bottle: Gram positive cocci in clusters resembling Staph       Positive results previously called 06/23/2022  00:11      STAPHYLOCOCCUS EPIDERMIDIS    Blood culture [[id number]]  (Abnormal)  (Susceptibility) Collected: 06/20/22 1651    Order Status: Completed Specimen: Blood from Peripheral, Hand, Left Updated: 06/25/22 1023     Blood Culture, Routine Gram stain dudley bottle: Gram positive cocci in clusters resembling Staph      Results called to and read back by:Tara López RN 06/21/2022  22:16      STAPHYLOCOCCUS EPIDERMIDIS    Blood culture [[id number]]     Order Status: Canceled Specimen: Blood             I have reviewed all pertinent labs within the past 24 hours.    Estimated Creatinine Clearance: 57.6 mL/min (A) (based on SCr of 2.1 mg/dL (H)).    Diagnostic Results:  I have reviewed and interpreted all pertinent imaging results/findings within the past 24 hours.

## 2022-06-27 NOTE — ASSESSMENT & PLAN NOTE
- [facility]  - Approved for LVAD at [facility] on 4/2/22. Was not evaluated for OHTx as he quit smoking in April 2022  - Moved to ICU 6/13 in the setting of significant volume overloaded and low output state. IABP placed 6/13 and Epi and Marcella added for RV support.   - IABP exchanged 6/23  - Lasix gtt D/C'd 6/17 secondary to increase in sCr, but then was restarted 6/23 at 40 mg/hr, now decreased to 10 mg/hr overnight. Given tolvaptan 6/24 with hyponatremia, which has since improved  - IABP 1:1, IV milrinone 0.375, epi 0.04, and Marcella 10 ppm  - CVP: 9, SVO 51, CO: 5.0 , CI: 2.2, SVR: 1700  - TTE 6/21: LVEF 15%, LVEDD: 7.14, TAPSE: 1.29 severe MR, mod TR. Mild RVE with mod reduced RV failure   - RHC done 4/19/22 on Milrinone 0.5 mcg/kg/min: RA 11, PA 50/27 (35), W 27, CO/CI 3.7/1.7, PVR 2.2 and SVR 1535   - GDMT: Entresto and Aldactone on hold since admit 5/24-5/27 2/2 elevated sCr. Resumed aldactone 25 mg QD 6/26  - Reports dry weight is 217#, and that he had gained 6# on his home scale PTA  - Tentative VAD implant 6/29. Unlikely OHTx candidate with smoking history (quit April 2022)

## 2022-06-27 NOTE — PROGRESS NOTES
06/27/2022  Casey Arizmendi    Current provider:  Luca Lopez Jr.,*    Device interrogation:  No flowsheet data found.       Rounded on Kevan Queen to ensure all mechanical assist device settings (IABP or VAD) were appropriate and all parameters were within limits.  I was able to ensure all back up equipment was present, the staff had no issues, and the Perfusion Department daily rounding was complete.      For implantable VADs: Interrogation of Ventricular assist device was performed with analysis of device parameters and review of device function. I have personally reviewed the interrogation findings and agree with findings as stated.     In emergency, the nursing units have been notified to contact the perfusion department either by:  Calling [id number] from 630am to 4pm Mon thru Fri, utilizing the On-Call Finder functionality of Epic and searching for Perfusion, or by contacting the hospital  from 4pm to 630am and on weekends and asking to speak with the perfusionist on call.    7:00 AM

## 2022-06-27 NOTE — SUBJECTIVE & OBJECTIVE
"Interval History: "OK". No acute events overnight. Cultures 6/25 remain NGTD. Potential VAD implant on 6/29.    Review of Systems   Constitutional:  Negative for chills, fatigue and fever.   HENT:  Negative for ear pain, mouth sores, nosebleeds, postnasal drip, rhinorrhea, sinus pressure, sore throat, tinnitus, trouble swallowing and voice change.    Eyes:  Negative for photophobia, pain, redness and visual disturbance.   Respiratory:  Negative for apnea, cough, chest tightness, shortness of breath and wheezing.    Cardiovascular:  Negative for chest pain, palpitations and leg swelling.   Gastrointestinal:  Negative for abdominal pain, blood in stool, constipation, diarrhea, nausea and vomiting.   Endocrine: Negative for cold intolerance, heat intolerance, polydipsia and polyuria.   Genitourinary:  Negative for decreased urine volume, difficulty urinating, dysuria, flank pain, frequency, genital sores, hematuria, penile discharge, penile pain, penile swelling, scrotal swelling, testicular pain and urgency.   Musculoskeletal:  Negative for arthralgias, back pain, joint swelling, myalgias and neck pain.   Skin:  Negative for color change and rash.   Allergic/Immunologic: Negative for environmental allergies and food allergies.   Neurological:  Negative for dizziness, seizures, syncope, weakness, light-headedness, numbness and headaches.   Hematological:  Negative for adenopathy. Does not bruise/bleed easily.   Psychiatric/Behavioral:  Negative for agitation, confusion, decreased concentration, hallucinations, self-injury, sleep disturbance and suicidal ideas. The patient is not nervous/anxious.    Objective:     Vital Signs (Most Recent):  Temp: 98.3 °F (36.8 °C) (06/27/22 1100)  Pulse: (!) 111 (06/27/22 1100)  Resp: (!) 24 (06/27/22 1100)  BP: 137/82 (06/27/22 1100)  SpO2: (!) 92 % (06/27/22 1100)   Vital Signs (24h Range):  Temp:  [97.2 °F (36.2 °C)-98.6 °F (37 °C)] 98.3 °F (36.8 °C)  Pulse:  [] 111  Resp:  " [11-62] 24  SpO2:  [91 %-100 %] 92 %  BP: (103-156)/(63-88) 137/82     Weight: 93.6 kg (206 lb 5.6 oz)  Body mass index is 25.79 kg/m².    Estimated Creatinine Clearance: 57.6 mL/min (A) (based on SCr of 2.1 mg/dL (H)).    Physical Exam  Vitals reviewed.   Constitutional:       Appearance: He is well-developed.   HENT:      Head: Normocephalic and atraumatic.      Right Ear: External ear normal.      Left Ear: External ear normal.   Eyes:      Conjunctiva/sclera: Conjunctivae normal.   Neck:      Thyroid: No thyromegaly.   Cardiovascular:      Rate and Rhythm: Normal rate and regular rhythm.      Heart sounds: Normal heart sounds. No murmur heard.     Comments: Balloon pump sounds.   Pulmonary:      Effort: Pulmonary effort is normal.      Breath sounds: Normal breath sounds. No wheezing or rales.   Abdominal:      General: Bowel sounds are normal.      Palpations: Abdomen is soft. There is no mass.      Tenderness: There is no abdominal tenderness. There is no rebound.   Musculoskeletal:         General: Normal range of motion.      Cervical back: Normal range of motion and neck supple.   Lymphadenopathy:      Cervical: No cervical adenopathy.   Skin:     General: Skin is warm and dry.   Neurological:      Mental Status: He is alert and oriented to person, place, and time.   Psychiatric:         Behavior: Behavior normal.       Significant Labs: Blood Culture:   Recent Labs   Lab 06/21/22  2250 06/23/22  0105 06/23/22  0108 06/25/22  0633 06/25/22  0634   LABBLOO Gram stain dudley bottle: Gram positive cocci in clusters resembling Staph  Results called to and read back by: Rosa Pardo RN. 06/22/2022  23:29  Gram stain aer bottle: Gram positive cocci in clusters resembling Staph   Positive results previously called 06/23/2022  04:33  STAPHYLOCOCCUS EPIDERMIDIS*  Gram stain dudley bottle: Gram positive cocci  Results called to and read back by: Arnulfo Herrera RN. 06/22/2022  17:46  Gram stain aer bottle: Gram  positive cocci in clusters resembling Staph   Positive results previously called 06/23/2022  00:11  STAPHYLOCOCCUS EPIDERMIDIS* No Growth to date  No Growth to date  No Growth to date  No Growth to date  No Growth to date Gram stain dudley bottle: Gram positive cocci in clusters resembling Staph   Results called to and read back by: Mark Pickett RN  06/24/2022    14:36  STAPHYLOCOCCUS EPIDERMIDIS* No Growth to date  No Growth to date  No Growth to date No Growth to date  No Growth to date  No Growth to date       BMP:   Recent Labs   Lab 06/27/22  0308   *   *   K 3.6   CL 89*   CO2 27   BUN 51*   CREATININE 2.1*   CALCIUM 10.4   MG 2.4       CBC:   Recent Labs   Lab 06/26/22  0303 06/27/22  0308   WBC 15.11* 14.14*   HGB 10.5* 10.3*   HCT 31.1* 30.5*    451*       Urine Culture: No results for input(s): LABURIN in the last 4320 hours.  Urine Studies:   Recent Labs   Lab 04/18/22  1424 05/20/22  1229   COLORU Straw  --    APPEARANCEUA Clear Clear   PHUR 7.0  --    SPECGRAV 1.010  --    PROTEINUA Negative Negative   GLUCUA Negative  --    KETONESU Negative  --    BILIRUBINUA Negative Negative   OCCULTUA 1+*  --    NITRITE Negative  --    UROBILINOGEN  --  0.2   LEUKOCYTESUR Negative Negative   RBCUA 5* <5   WBCUA 0 <5   BACTERIA  --  None Seen       Wound Culture: No results for input(s): LABAERO in the last 4320 hours.    Significant Imaging: I have reviewed all pertinent imaging results/findings within the past 24 hours.

## 2022-06-27 NOTE — SUBJECTIVE & OBJECTIVE
"Interval HPI:   Overnight events: No acute events overnight. Patient on the CICU in room [id number] A. Blood glucose improving. BG at and above goal on current insulin regimen (SSI, prandial, and basal insulin ). Steroid use- None. 4 Days Post-Op  Renal function- Abnormal - Creatinine 2.1   Vasopressors-  None       Diet diabetic [facility];  Calorie; Cardiac (Low Na/Chol); Isolation Tray - Regular [place]; Fluid - 1500mL     Eatin%  Nausea: No  Hypoglycemia and intervention: No  Fever: No  TPN and/or TF: No      /82   Pulse (!) 111   Temp 98.3 °F (36.8 °C) (Oral)   Resp (!) 24   Ht 6' 3" (1.905 m)   Wt 93.6 kg (206 lb 5.6 oz)   SpO2 (!) 92%   BMI 25.79 kg/m²     Labs Reviewed and Include    Recent Labs   Lab 22  0308   *   CALCIUM 10.4   ALBUMIN 3.6   PROT 8.6*   *   K 3.6   CO2 27   CL 89*   BUN 51*   CREATININE 2.1*   ALKPHOS 151*   ALT 27   AST 28   BILITOT 0.9     Lab Results   Component Value Date    WBC 14.14 (H) 2022    HGB 10.3 (L) 2022    HCT 30.5 (L) 2022    MCV 88 2022     (H) 2022     No results for input(s): TSH, FREET4 in the last 168 hours.  Lab Results   Component Value Date    HGBA1C 9.2 (H) 2022       Nutritional status:   Body mass index is 25.79 kg/m².  Lab Results   Component Value Date    ALBUMIN 3.6 2022    ALBUMIN 3.4 (L) 2022    ALBUMIN 3.6 2022     Lab Results   Component Value Date    PREALBUMIN 36 2022       Estimated Creatinine Clearance: 57.6 mL/min (A) (based on SCr of 2.1 mg/dL (H)).    Accu-Checks  Recent Labs     22  0429 22  0431 22  0819 22  1135 22  1619 22  0105 22  0423 22  0827 22  1119   POCTGLUCOSE 263* 258* 272* 238* 136* 110 222* 286* 179* 189*       Current Medications and/or Treatments Impacting Glycemic Control  Immunotherapy:    Immunosuppressants       None          Steroids: "   Hormones (From admission, onward)                None          Pressors:    Autonomic Drugs (From admission, onward)                Start     Stop Route Frequency Ordered    06/13/22 2130  EPINEPHrine (ADRENALIN) 5 mg in dextrose 5 % 250 mL infusion         -- IV Continuous 06/13/22 2024          Hyperglycemia/Diabetes Medications:   Antihyperglycemics (From admission, onward)                Start     Stop Route Frequency Ordered    06/27/22 1230  insulin regular in 0.9 % NaCl 100 unit/100 mL (1 unit/mL) infusion        Question:  Enter initial dose (Units/hr):  Answer:  1.5    -- IV Continuous 06/27/22 1227    06/26/22 1645  insulin aspart U-100 pen 8-12 Units         -- SubQ 3 times daily with meals 06/26/22 1154    06/25/22 1127  insulin aspart U-100 pen 0-10 Units         -- SubQ As needed (PRN) 06/25/22 1028

## 2022-06-27 NOTE — PROGRESS NOTES
"[facility] - Cardiac Intensive Care  Endocrinology  Progress Note    Admit Date: 2022     Reason for Consult: Management of  type 2 DM, Hyperglycemia     Surgical Procedure and Date: none    Diabetes diagnosis year: 21    Home Diabetes Medications:  Detemir  23  units daily and Aspart   12  units TIDWM and  Mod dose correction insulin    Lab Results   Component Value Date    HGBA1C 9.2 (H) 2022           How often checking glucose at home? 1-3 x day   BG readings on regimen: 180- up to 300 once  Hypoglycemia on the regimen?  yes once- related to not really eating after taking aspart   Missed doses on regimen?  no    Diabetes Complications include:     Hyperglycemia    Complicating diabetes co morbidities:   History of MI, CHF, and CKD      HPI:   Patient is a 37 y.o. male with a diagnosis of type 2 DM and NIDCM with BiV systolic heart failure. Patient was presented at [facility] 22  and was  approved for VAD. Also recently admitted with [facility]; he had clinic appointment last week to f/u recent admit for hyperglycemic hyperosmolar syndrome but did not come as he was "feeling too bad" presents to  ED with SOB. Endocrinology consulted for BG/ DM management.                Interval HPI:   Overnight events: No acute events overnight. Patient on the CICU in room [id number] A. Blood glucose improving. BG at and above goal on current insulin regimen (SSI, prandial, and basal insulin ). Steroid use- None. 4 Days Post-Op  Renal function- Abnormal - Creatinine 2.1   Vasopressors-  None       Diet diabetic [facility]; 2000 Calorie; Cardiac (Low Na/Chol); Isolation Tray - Regular [place]; Fluid - 1500mL     Eatin%  Nausea: No  Hypoglycemia and intervention: No  Fever: No  TPN and/or TF: No      /82   Pulse (!) 111   Temp 98.3 °F (36.8 °C) (Oral)   Resp (!) 24   Ht 6' 3" (1.905 m)   Wt 93.6 kg (206 lb 5.6 oz)   SpO2 (!) 92%   BMI 25.79 kg/m²     Labs Reviewed and Include    Recent Labs "   Lab 06/27/22  0308   *   CALCIUM 10.4   ALBUMIN 3.6   PROT 8.6*   *   K 3.6   CO2 27   CL 89*   BUN 51*   CREATININE 2.1*   ALKPHOS 151*   ALT 27   AST 28   BILITOT 0.9     Lab Results   Component Value Date    WBC 14.14 (H) 06/27/2022    HGB 10.3 (L) 06/27/2022    HCT 30.5 (L) 06/27/2022    MCV 88 06/27/2022     (H) 06/27/2022     No results for input(s): TSH, FREET4 in the last 168 hours.  Lab Results   Component Value Date    HGBA1C 9.2 (H) 05/20/2022       Nutritional status:   Body mass index is 25.79 kg/m².  Lab Results   Component Value Date    ALBUMIN 3.6 06/27/2022    ALBUMIN 3.4 (L) 06/26/2022    ALBUMIN 3.6 06/25/2022     Lab Results   Component Value Date    PREALBUMIN 36 04/18/2022       Estimated Creatinine Clearance: 57.6 mL/min (A) (based on SCr of 2.1 mg/dL (H)).    Accu-Checks  Recent Labs     06/26/22  0429 06/26/22  0431 06/26/22  0819 06/26/22  1135 06/26/22  1619 06/26/22  2000 06/27/22  0105 06/27/22  0423 06/27/22  0827 06/27/22  1119   POCTGLUCOSE 263* 258* 272* 238* 136* 110 222* 286* 179* 189*       Current Medications and/or Treatments Impacting Glycemic Control  Immunotherapy:    Immunosuppressants       None          Steroids:   Hormones (From admission, onward)                None          Pressors:    Autonomic Drugs (From admission, onward)                Start     Stop Route Frequency Ordered    06/13/22 2130  EPINEPHrine (ADRENALIN) 5 mg in dextrose 5 % 250 mL infusion         -- IV Continuous 06/13/22 2024          Hyperglycemia/Diabetes Medications:   Antihyperglycemics (From admission, onward)                Start     Stop Route Frequency Ordered    06/27/22 1230  insulin regular in 0.9 % NaCl 100 unit/100 mL (1 unit/mL) infusion        Question:  Enter initial dose (Units/hr):  Answer:  1.5    -- IV Continuous 06/27/22 1227    06/26/22 1645  insulin aspart U-100 pen 8-12 Units         -- SubQ 3 times daily with meals 06/26/22 1154    06/25/22 1127  insulin  aspart U-100 pen 0-10 Units         -- SubQ As needed (PRN) 06/25/22 1028            ASSESSMENT and PLAN    * Acute on chronic combined systolic and diastolic heart failure, NYHA class 4  Optimize glucose control and  avoid hypoglycemia    Managed per primary.       Uncontrolled diabetes mellitus  Endocrinology consulted for BG management.   BG goal 140-180    - Transition drip at 1.5 units/hr with step-down parameters   - Novolog 8-12 units TIDWM (basal/prandial matching)  - Novolog (aspart) insulin prn for BG excursions [facility] (150/25).   - BG checks q4hr  - Hypoglycemia protocol in place  - If blood glucose greater than 300, please ask patient not to eat food or drink anything other than water until correctional insulin has brought it back below 250    - Will likely require IIP s/p LVAD    - Provided bedside diabetes education.    ** Please notify Endocrine for any change and/or advance in diet**  ** Please call Endocrine for any BG related issues **    Discharge Planning:   TBD. Please notify endocrinology prior to discharge.        CKD (chronic kidney disease) stage 3, GFR 30-59 ml/min    Titrate insulin slowly to avoid hypoglycemia as the risk of hypoglycemia increases with decreased creatinine clearance.    Estimated Creatinine Clearance: 57.6 mL/min (A) (based on SCr of 2.1 mg/dL (H)).           Michael Wyman, DNP, FNP  Endocrinology  [facility] - Cardiac Intensive [facility]

## 2022-06-27 NOTE — PT/OT/SLP DISCHARGE
Occupational Therapy Discharge Summary    Kevan Queen  MRN: [id number]   Principal Problem: Acute on chronic combined systolic and diastolic heart failure, NYHA class 4      Patient Discharged from acute Occupational Therapy on 6/27 2* pt independent in bed level excersises. Pt not able to participate in additional OOB activity/ADLs 2* femoral IABP in place. Please reconsult when pt medically stable to advance in therapies.     Maryse Kaur, LOTR  06/27/2022

## 2022-06-27 NOTE — ASSESSMENT & PLAN NOTE
- Admit sCr 1.8, baseline ~ 1.5-1.7  - Creatinine has been stable, but increased to 2.1 today   - Decrease diuretics and consider hold aldactone tomorrow if Cr does not improve  - Will monitor response

## 2022-06-27 NOTE — PROGRESS NOTES
[facility] - Cardiac Intensive [facility]  Infectious Disease  Progress Note    Patient Name: Kevan Queen  MRN: [id number]  Admission Date: 6/13/2022  Length of Stay: 14 days  Attending Physician: Angela Yang DO  Primary Care Provider: ORALIA Cline    Isolation Status: No active isolations  Assessment/Plan:      Staphylococcus epidermidis bacteremia  A 37-year-old male with history of NIDCM with BiV systolic heart failure on home milrinone presented 6/13/2022 for ADHF, IABP placed 6/13/2022, now with Staph epi bacteremia, vancomycin initiated 6/22/2022. IABP and IJ line exchanged on 6/23/2022. Repeat blood cultures from 6/23/2022 positive - but this was prior to IABL and LIJ exchange.    Afebrile and wit leukocytosis which suspect may be a leukemoid reaction. Repeat blood cultures 6/25 remain NGTD. Hyponatremia and elevated creatinine noted.     Recommendations:  · Continue vancomycin IV, goal 15-20  · Recommend a 14 day course of vancomycin from VAD implantation on 6/29. End date: 7/12/22  · If blood cultures 6/25 become positive then consider GUILLERMO to evaluate for ICD endocarditis.  · No absolute contraindications from ID standpoint to proceed with VAD implant on 6/29 if blood cultures remain no growth.   · Discussed with the patient and primary service.         Anticipated Disposition: per primary    Thank you for your consult. I will sign off. Please contact us if you have any additional questions.    Melly Horta MD  Infectious Disease  [facility] - [facility]    Subjective:     Principal Problem:Acute on chronic combined systolic and diastolic heart failure, NYHA class 4    HPI: 37-year-old male with history of NIDCM with BiV systolic heart failure on home milrinone presented 6/13/2022 for ADHF. Patient not current candidate for OHT as recently quit smoking 4/2022. IABP placed 6/13/2022, central line exchanged 6/20/2022. Blood culture x1 drawn on 6/20/2022, returned with GPC. Repeat  "blood cultures drawn 6/21/2022 x2. Patient reports tenderness around right femoral IABP - started once IABP was placed. Denied any worsening pain around right femoral line.    Interval History: "OK". No acute events overnight. Cultures 6/25 remain NGTD. Potential VAD implant on 6/29.    Review of Systems   Constitutional:  Negative for chills, fatigue and fever.   HENT:  Negative for ear pain, mouth sores, nosebleeds, postnasal drip, rhinorrhea, sinus pressure, sore throat, tinnitus, trouble swallowing and voice change.    Eyes:  Negative for photophobia, pain, redness and visual disturbance.   Respiratory:  Negative for apnea, cough, chest tightness, shortness of breath and wheezing.    Cardiovascular:  Negative for chest pain, palpitations and leg swelling.   Gastrointestinal:  Negative for abdominal pain, blood in stool, constipation, diarrhea, nausea and vomiting.   Endocrine: Negative for cold intolerance, heat intolerance, polydipsia and polyuria.   Genitourinary:  Negative for decreased urine volume, difficulty urinating, dysuria, flank pain, frequency, genital sores, hematuria, penile discharge, penile pain, penile swelling, scrotal swelling, testicular pain and urgency.   Musculoskeletal:  Negative for arthralgias, back pain, joint swelling, myalgias and neck pain.   Skin:  Negative for color change and rash.   Allergic/Immunologic: Negative for environmental allergies and food allergies.   Neurological:  Negative for dizziness, seizures, syncope, weakness, light-headedness, numbness and headaches.   Hematological:  Negative for adenopathy. Does not bruise/bleed easily.   Psychiatric/Behavioral:  Negative for agitation, confusion, decreased concentration, hallucinations, self-injury, sleep disturbance and suicidal ideas. The patient is not nervous/anxious.    Objective:     Vital Signs (Most Recent):  Temp: 98.3 °F (36.8 °C) (06/27/22 1100)  Pulse: (!) 111 (06/27/22 1100)  Resp: (!) 24 (06/27/22 1100)  BP: " 137/82 (06/27/22 1100)  SpO2: (!) 92 % (06/27/22 1100)   Vital Signs (24h Range):  Temp:  [97.2 °F (36.2 °C)-98.6 °F (37 °C)] 98.3 °F (36.8 °C)  Pulse:  [] 111  Resp:  [11-62] 24  SpO2:  [91 %-100 %] 92 %  BP: (103-156)/(63-88) 137/82     Weight: 93.6 kg (206 lb 5.6 oz)  Body mass index is 25.79 kg/m².    Estimated Creatinine Clearance: 57.6 mL/min (A) (based on SCr of 2.1 mg/dL (H)).    Physical Exam  Vitals reviewed.   Constitutional:       Appearance: He is well-developed.   HENT:      Head: Normocephalic and atraumatic.      Right Ear: External ear normal.      Left Ear: External ear normal.   Eyes:      Conjunctiva/sclera: Conjunctivae normal.   Neck:      Thyroid: No thyromegaly.   Cardiovascular:      Rate and Rhythm: Normal rate and regular rhythm.      Heart sounds: Normal heart sounds. No murmur heard.     Comments: Balloon pump sounds.   Pulmonary:      Effort: Pulmonary effort is normal.      Breath sounds: Normal breath sounds. No wheezing or rales.   Abdominal:      General: Bowel sounds are normal.      Palpations: Abdomen is soft. There is no mass.      Tenderness: There is no abdominal tenderness. There is no rebound.   Musculoskeletal:         General: Normal range of motion.      Cervical back: Normal range of motion and neck supple.   Lymphadenopathy:      Cervical: No cervical adenopathy.   Skin:     General: Skin is warm and dry.   Neurological:      Mental Status: He is alert and oriented to person, place, and time.   Psychiatric:         Behavior: Behavior normal.       Significant Labs: Blood Culture:   Recent Labs   Lab 06/21/22  2250 06/23/22  0105 06/23/22  0108 06/25/22  0633 06/25/22  0634   LABBLOO Gram stain dudley bottle: Gram positive cocci in clusters resembling Staph  Results called to and read back by: Rosa Parod RN. 06/22/2022  23:29  Gram stain aer bottle: Gram positive cocci in clusters resembling Staph   Positive results previously called 06/23/2022  04:33   STAPHYLOCOCCUS EPIDERMIDIS*  Gram stain dudley bottle: Gram positive cocci  Results called to and read back by: Arnulfo Herrera RN. 06/22/2022  17:46  Gram stain aer bottle: Gram positive cocci in clusters resembling Staph   Positive results previously called 06/23/2022  00:11  STAPHYLOCOCCUS EPIDERMIDIS* No Growth to date  No Growth to date  No Growth to date  No Growth to date  No Growth to date Gram stain dudley bottle: Gram positive cocci in clusters resembling Staph   Results called to and read back by: Mark Pickett RN  06/24/2022    14:36  STAPHYLOCOCCUS EPIDERMIDIS* No Growth to date  No Growth to date  No Growth to date No Growth to date  No Growth to date  No Growth to date       BMP:   Recent Labs   Lab 06/27/22  0308   *   *   K 3.6   CL 89*   CO2 27   BUN 51*   CREATININE 2.1*   CALCIUM 10.4   MG 2.4       CBC:   Recent Labs   Lab 06/26/22  0303 06/27/22  0308   WBC 15.11* 14.14*   HGB 10.5* 10.3*   HCT 31.1* 30.5*    451*       Urine Culture: No results for input(s): LABURIN in the last 4320 hours.  Urine Studies:   Recent Labs   Lab 04/18/22  1424 05/20/22  1229   COLORU Straw  --    APPEARANCEUA Clear Clear   PHUR 7.0  --    SPECGRAV 1.010  --    PROTEINUA Negative Negative   GLUCUA Negative  --    KETONESU Negative  --    BILIRUBINUA Negative Negative   OCCULTUA 1+*  --    NITRITE Negative  --    UROBILINOGEN  --  0.2   LEUKOCYTESUR Negative Negative   RBCUA 5* <5   WBCUA 0 <5   BACTERIA  --  None Seen       Wound Culture: No results for input(s): LABAERO in the last 4320 hours.    Significant Imaging: I have reviewed all pertinent imaging results/findings within the past 24 hours.

## 2022-06-27 NOTE — ASSESSMENT & PLAN NOTE
Titrate insulin slowly to avoid hypoglycemia as the risk of hypoglycemia increases with decreased creatinine clearance.    Estimated Creatinine Clearance: 57.6 mL/min (A) (based on SCr of 2.1 mg/dL (H)).

## 2022-06-27 NOTE — PROGRESS NOTES
[facility] - Cardiac Intensive Care  Heart Transplant  Progress Note    Patient Name: Kevan Queen  MRN: [id number]  Admission Date: 6/13/2022  Hospital Length of Stay: 14 days  Attending Physician: Luca Lopez Jr.,*  Primary Care Provider: ORALIA Cline  Principal Problem:Acute on chronic combined systolic and diastolic heart failure, NYHA class 4    Subjective:     Interval History: No complaints. IABP placement adjusted yesterday with Interventional Cardiology. CVP 9, SVO2 51%, CO 5.0, CI 2.2, SVR 1302 with IABP 1:1, epi 0.04, milrinone 0.375, Marcella 10 ppm. Cr bumped from 1.8 to 2.1. Decreased IV Lasix gtt from 20 to 10 mg/hr. Continue aldactone 25 QD, may consider discontinuing tomorrow if Cr remains elevated. Blood cultures 6/25 NGTD. Will need ID clearance prior to LVAD implant on 6/29/22.         Lines:  IABP 6/23  Right IJ 6/23    Continuous Infusions:   sodium chloride 0.9% 5 mL/hr at 06/27/22 0055    EPINEPHrine 0.04 mcg/kg/min (06/27/22 1100)    furosemide (LASIX) 10 mg/mL infusion (non-titrating) 10 mg/hr (06/27/22 1100)    heparin (porcine) in D5W 22 Units/kg/hr (06/27/22 1100)    insulin regular 1 units/mL infusion orderable (TRANSFER) 1.2 Units/hr (06/27/22 1100)    milrinone 20mg/100ml D5W (200mcg/ml) 0.375 mcg/kg/min (06/27/22 1100)    nitric oxide gas       Scheduled Meds:   acetaminophen  650 mg Oral Q8H    aspirin  81 mg Oral Daily    busPIRone  7.5 mg Oral Q12H    gabapentin  300 mg Oral TID    HYDROcodone-acetaminophen  1 tablet Oral Q8H    insulin aspart U-100  8-12 Units Subcutaneous TIDWM    LIDOcaine HCL 20 mg/ml (2%)  20 mL Other Once    methocarbamoL  500 mg Oral QID    mirtazapine  15 mg Oral Nightly    pantoprazole  40 mg Oral Daily    polyethylene glycol  17 g Oral BID    potassium chloride  30 mEq Oral BID    senna-docusate 8.6-50 mg  1 tablet Oral Daily    spironolactone  25 mg Oral Daily    sucralfate  1 g Oral Nightly    vancomycin (VANCOCIN) IVPB   1,000 mg Intravenous Q12H     PRN Meds:sodium chloride, sodium chloride, sodium chloride, ALPRAZolam, bisacodyL, dextrose 10%, dextrose 10%, diphenhydrAMINE, glucagon (human recombinant), glucose, glucose, insulin aspart U-100, magnesium oxide, magnesium oxide, mupirocin, potassium bicarbonate, potassium bicarbonate, potassium bicarbonate, potassium, sodium phosphates, potassium, sodium phosphates, potassium, sodium phosphates, promethazine, sodium chloride 0.9%, sodium chloride 0.9%, Pharmacy to dose Vancomycin consult **AND** vancomycin - pharmacy to dose    Review of patient's allergies indicates:   Allergen Reactions    Aspirin Other (See Comments)     Mr. Thacker is enrolled in Dr. Paige's Alon Trial and cannot have any aspirin and/or aspirin-containing products. DO NOT cancel any orders for INV Aspirin 100 mg/Placebo. If you have questions, please contact Isabel @ [phone], [phone],[email or web address], secure chat or MS Teams message.    Bumex [bumetanide] Hives    Lactose Diarrhea     Other reaction(s): Abdominal distension, gaseous    Torsemide Hives     Objective:     Vital Signs (Most Recent):  Temp: 98.3 °F (36.8 °C) (06/27/22 1100)  Pulse: (!) 111 (06/27/22 1100)  Resp: (!) 24 (06/27/22 1100)  BP: 137/82 (06/27/22 1100)  SpO2: (!) 92 % (06/27/22 1100)   Vital Signs (24h Range):  Temp:  [97.2 °F (36.2 °C)-98.6 °F (37 °C)] 98.3 °F (36.8 °C)  Pulse:  [] 111  Resp:  [11-62] 24  SpO2:  [91 %-100 %] 92 %  BP: (103-156)/(63-88) 137/82     Patient Vitals for the past 72 hrs (Last 3 readings):   Weight   06/27/22 0300 93.6 kg (206 lb 5.6 oz)   06/26/22 0300 96.7 kg (213 lb 3 oz)   06/25/22 0300 95.3 kg (210 lb 1.6 oz)       Body mass index is 25.79 kg/m².      Intake/Output Summary (Last 24 hours) at 6/27/2022 1132  Last data filed at 6/27/2022 1100  Gross per 24 hour   Intake 2659.29 ml   Output 4445 ml   Net -1785.71 ml         Hemodynamic Parameters:       Telemetry: ST    Physical  Exam  Constitutional:       Appearance: Normal appearance.   HENT:      Head: Normocephalic and atraumatic.   Eyes:      Conjunctiva/sclera: Conjunctivae normal.      Pupils: Pupils are equal, round, and reactive to light.   Neck:      Comments: RIJ TLC  Cardiovascular:      Rate and Rhythm: Regular rhythm. Tachycardia present.      Comments: +S3  Pulmonary:      Effort: Pulmonary effort is normal.      Breath sounds: Normal breath sounds.   Abdominal:      General: Bowel sounds are normal. There is distension.   Musculoskeletal:         General: No swelling. Normal range of motion.      Cervical back: Neck supple.   Skin:     General: Skin is warm and dry.      Capillary Refill: Capillary refill takes 2 to 3 seconds.   Neurological:      General: No focal deficit present.      Mental Status: He is alert and oriented to person, place, and time.   Psychiatric:         Mood and Affect: Mood normal.         Behavior: Behavior normal.         Thought Content: Thought content normal.         Judgment: Judgment normal.       Significant Labs:  CBC:  Recent Labs   Lab 06/25/22  0248 06/26/22  0303 06/27/22  0308   WBC 12.39 15.11* 14.14*   RBC 3.66* 3.56* 3.45*   HGB 10.5* 10.5* 10.3*   HCT 32.5* 31.1* 30.5*    449 451*   MCV 89 87 88   MCH 28.7 29.5 29.9   MCHC 32.3 33.8 33.8       BNP:  No results for input(s): BNP in the last 168 hours.    Invalid input(s): BNPTRIAGELBLO    CMP:  Recent Labs   Lab 06/25/22  0248 06/25/22  0615 06/26/22  0303 06/26/22  1420 06/27/22  0308   *   < > 197* 102 255*   CALCIUM 10.4   < > 10.3 10.6* 10.4   ALBUMIN 3.6  --  3.4*  --  3.6   PROT 8.6*  --  8.2  --  8.6*   *   < > 134* 133* 130*   K 4.0   < > 3.5 4.5 3.6   CO2 31*   < > 27 28 27   CL 91*   < > 91* 92* 89*   BUN 35*   < > 40* 43* 51*   CREATININE 1.8*   < > 1.8* 1.9* 2.1*   ALKPHOS 156*  --  155*  --  151*   ALT 30  --  29  --  27   AST 34  --  30  --  28   BILITOT 1.0  --  1.1*  --  0.9    < > = values in this  interval not displayed.        Coagulation:   Recent Labs   Lab 06/20/22  1607 06/21/22  0418 06/25/22  0248 06/26/22  0303 06/27/22  0308   INR 1.0  --   --   --   --    APTT  --    < > 44.7* 49.3* 56.3*    < > = values in this interval not displayed.       LDH:  Recent Labs   Lab 06/24/22  1150 06/25/22  0248 06/26/22  0303 06/27/22  0308   * 379* 388* 350*       Microbiology:  Microbiology Results (last 7 days)       Procedure Component Value Units Date/Time    Blood culture [[id number]]  (Abnormal)  (Susceptibility) Collected: 06/23/22 0108    Order Status: Completed Specimen: Blood from Peripheral, Hand, Right Updated: 06/27/22 1116     Blood Culture, Routine Gram stain dudley bottle: Gram positive cocci in clusters resembling Staph       Results called to and read back by: Mark Pickett RN  06/24/2022        14:36      STAPHYLOCOCCUS EPIDERMIDIS    Blood culture [[id number]] Collected: 06/25/22 0634    Order Status: Completed Specimen: Blood from Peripheral, Wrist, Left Updated: 06/27/22 0812     Blood Culture, Routine No Growth to date      No Growth to date      No Growth to date    Blood culture [[id number]] Collected: 06/25/22 0633    Order Status: Completed Specimen: Blood from Peripheral, Antecubital, Right Updated: 06/27/22 0812     Blood Culture, Routine No Growth to date      No Growth to date      No Growth to date    Blood culture [[id number]] Collected: 06/23/22 0105    Order Status: Completed Specimen: Blood from Peripheral, Antecubital, Right Updated: 06/27/22 0612     Blood Culture, Routine No Growth to date      No Growth to date      No Growth to date      No Growth to date      No Growth to date    Blood culture [[id number]]  (Abnormal)  (Susceptibility) Collected: 06/21/22 2250    Order Status: Completed Specimen: Blood from Peripheral, Wrist, Left Updated: 06/26/22 1059     Blood Culture, Routine Gram stain dudley bottle: Gram positive cocci in clusters resembling Staph      Results called to  and read back by: Rosa Pardo RN. 06/22/2022  23:29      Gram stain aer bottle: Gram positive cocci in clusters resembling Staph       Positive results previously called 06/23/2022  04:33      STAPHYLOCOCCUS EPIDERMIDIS    Blood culture [[id number]]  (Abnormal)  (Susceptibility) Collected: 06/21/22 2250    Order Status: Completed Specimen: Blood from Peripheral, Wrist, Left Updated: 06/25/22 1029     Blood Culture, Routine Gram stain dudley bottle: Gram positive cocci      Results called to and read back by: Arnulfo Herrera RN. 06/22/2022  17:46      Gram stain aer bottle: Gram positive cocci in clusters resembling Staph       Positive results previously called 06/23/2022  00:11      STAPHYLOCOCCUS EPIDERMIDIS    Blood culture [[id number]]  (Abnormal)  (Susceptibility) Collected: 06/20/22 1651    Order Status: Completed Specimen: Blood from Peripheral, Hand, Left Updated: 06/25/22 1023     Blood Culture, Routine Gram stain dudley bottle: Gram positive cocci in clusters resembling Staph      Results called to and read back by:Tara López RN 06/21/2022  22:16      STAPHYLOCOCCUS EPIDERMIDIS    Blood culture [[id number]]     Order Status: Canceled Specimen: Blood             I have reviewed all pertinent labs within the past 24 hours.    Estimated Creatinine Clearance: 57.6 mL/min (A) (based on SCr of 2.1 mg/dL (H)).    Diagnostic Results:  I have reviewed and interpreted all pertinent imaging results/findings within the past 24 hours.    Assessment and Plan:     36 yo male with NIDCM with BiV systolic heart failure, on home Milrinone at 0.375 mcg/kg/min, presented at [facility] 4/2/22 ,was not evaluated for OHT as he has recently quit smoking in April 2022 but was approved for VAD with plan to begin OHT evaluation in upcoming months if Mr Queen is stable and suitable for OHT eval (blood group A), issues with frozen shoulder following ICD implant in the past, had clinic appointment last week to f/u recent admit for  "hyperglycemic hyperosmolar syndrome but did not come as he was "feeling too bad" presents to our ED with SOB at rest for 1 week, 6# weight gain (reports dry weight is 217#), inability to sleep past 3 nights 2/2 SOB (says he sleep on his side). Went to ED at [facility] 6/10 but left after waiting 4 hours. Had clinic appointment with us today, but arrived to [facility] early this morning and decided to go to the ED instead. Baseline Lasix dose is 80 mg bid. Reports taking 240 mg qd past 3 days with no improvement. BNP is 1701, up from 898 on 6/2 and 49 on 5/24. sCr is 1.8 with baseline ~ 1.5-1.7. sPO2 on RA is 93%. Wife at bedside    He has been given Lasix 80 mg IVP in the ED with plan to start Lasix gtt at 20 mg/hr           * Acute on chronic combined systolic and diastolic heart failure, NYHA class 4  - [facility]  - Approved for LVAD at [facility] on 4/2/22. Was not evaluated for OHTx as he quit smoking in April 2022  - Moved to ICU 6/13 in the setting of significant volume overloaded and low output state. IABP placed 6/13 and Epi and Marcella added for RV support.   - IABP exchanged 6/23  - Lasix gtt D/C'd 6/17 secondary to increase in sCr, but then was restarted 6/23 at 40 mg/hr, now decreased to 10 mg/hr overnight. Given tolvaptan 6/24 with hyponatremia, which has since improved  - IABP 1:1, IV milrinone 0.375, epi 0.04, and Marcella 10 ppm  - CVP: 9, SVO 51, CO: 5.0 , CI: 2.2, SVR: 1700  - TTE 6/21: LVEF 15%, LVEDD: 7.14, TAPSE: 1.29 severe MR, mod TR. Mild RVE with mod reduced RV failure   - RHC done 4/19/22 on Milrinone 0.5 mcg/kg/min: RA 11, PA 50/27 (35), W 27, CO/CI 3.7/1.7, PVR 2.2 and SVR 1535   - GDMT: Entresto and Aldactone on hold since admit 5/24-5/27 2/2 elevated sCr. Resumed aldactone 25 mg QD 6/26  - Reports dry weight is 217#, and that he had gained 6# on his home scale PTA  - Tentative VAD implant 6/29. Unlikely OHTx candidate with smoking history (quit April 2022)     Staphylococcus epidermidis " bacteremia  -Blood cultures 6/20 and repeat 6/21 positive for Staph Epi. One of two blood cultures from 6/23 positive for Staph (taken prior to line exchange). Repeat cultures sent 6/25 NGTD.   -IJ exchanged on 6/23, IABP exchanged 6/23  -Continue Empiric Vanc 6/22  -ID consulted given upcoming planned LVAD, continue Vanc. Will need ID clearance prior to LVAD implant    Muscle cramping  -decreased scheduled Percocet and starting Gabapentin    Uncontrolled diabetes mellitus  -Admitted 5/24-5/27 with hyperglycemia hyperosmolar syndrome  -Hgb A1C 9.2 on 5/20/22  -Endocrine consulted, adjusedt insulin orders and provided DM education     CKD (chronic kidney disease) stage 3, GFR 30-59 ml/min  - Admit sCr 1.8, baseline ~ 1.5-1.7  - Creatinine has been stable, but increased to 2.1 today   - Decrease diuretics and consider hold aldactone tomorrow if Cr does not improve  - Will monitor response    Cardiogenic shock  -See A/C CHF    Uninterrupted Critical Care/Counseling Time (not including procedures): 65 minutes      Denise Espinoza PA-C  Heart Transplant  [facility] - Cardiac Int[facility]

## 2022-06-27 NOTE — PLAN OF CARE
CICU DAILY GOALS       A: Awake    RASS: Goal - RASS Goal: 0-->alert and calm  Actual - RASS (Diggs Agitation-Sedation Scale): 0-->alert and calm   Restraint necessity:    B: Breath   SBT: Not intubated   C: Coordinate A & B, analgesics/sedatives   Pain: managed    SAT: Not intubated  D: Delirium   CAM-ICU: Overall CAM-ICU: Negative  E: Early(intubated/ Progressive (non-intubated) Mobility   MOVE Screen: Fail   Activity: Activity Management: Rolling - L1  FAS: Feeding/Nutrition   Diet order: Diet/Nutrition Received: 2 gram sodium, low saturated fat/low cholesterol,   Fluid restriction: Fluid Requirement: 1500  T: Thrombus   DVT prophylaxis: VTE Required Core Measure: Pharmacological prophylaxis initiated/maintained  H: HOB Elevation   Head of Bed (HOB) Positioning: HOB at 15 degrees  U: Ulcer Prophylaxis   GI: yes  G: Glucose control   uncontrolled Glycemic Management: blood glucose monitored  S: Skin   Bundle compliance: yes   Bathing/Skin Care: bath, complete, linen changed, electrode patches/site rotation Date: AM bath  B: Bowel Function   no issues   I: Indwelling Catheters   Cartwright necessity:     CVC necessity: Yes   IPAD offered: No  D: De-escalation Antibx   No  Plan for the day   Monitor CVPs 13, 10, 9, SVO2 62, monitor VS, IABP remains 1:1, IABP site CDI, pulses palpable.  Family/Goals of care/Code Status   Code Status: Full Code     No acute events throughout day, VS and assessment per flow sheet, patient progressing towards goals as tolerated, plan of care reviewed with Kevan Queen and family, all concerns addressed, will continue to monitor       Problem: Adult Inpatient Plan of Care  Goal: Plan of Care Review  Outcome: Ongoing, Progressing  Goal: Patient-Specific Goal (Individualized)  Outcome: Ongoing, Progressing  Goal: Absence of Hospital-Acquired Illness or Injury  Outcome: Ongoing, Progressing     Problem: Diabetes Comorbidity  Goal: Blood Glucose Level Within Targeted Range  Outcome: Ongoing,  Not Progressing

## 2022-06-27 NOTE — ASSESSMENT & PLAN NOTE
A 37-year-old male with history of NIDCM with BiV systolic heart failure on home milrinone presented 6/13/2022 for ADHF, IABP placed 6/13/2022, now with Staph epi bacteremia, vancomycin initiated 6/22/2022. IABP and IJ line exchanged on 6/23/2022. Repeat blood cultures from 6/23/2022 positive - but this was prior to IABL and LIJ exchange.    Afebrile and wit leukocytosis which suspect may be a leukemoid reaction. Repeat blood cultures 6/25 remain NGTD. Hyponatremia and elevated creatinine noted.     Recommendations:  · Continue vancomycin IV, goal 15-20  · Recommend a 14 day course of vancomycin from VAD implantation on 6/29. End date: 7/12/22  · If blood cultures 6/25 become positive then consider GUILLERMO to evaluate for ICD endocarditis.  · No absolute contraindications from ID standpoint to proceed with VAD implant on 6/29 if blood cultures remain no growth.   · Discussed with the patient and primary service.

## 2022-06-28 ENCOUNTER — ANESTHESIA EVENT (OUTPATIENT)
Dept: SURGERY | Facility: HOSPITAL | Age: 37
DRG: 001 | End: 2022-06-28
Payer: MEDICAID

## 2022-06-28 LAB
ABO + RH BLD: NORMAL
ALBUMIN SERPL BCP-MCNC: 3.5 G/DL (ref 3.5–5.2)
ALLENS TEST: ABNORMAL
ALP SERPL-CCNC: 153 U/L (ref 55–135)
ALT SERPL W/O P-5'-P-CCNC: 27 U/L (ref 10–44)
ANION GAP SERPL CALC-SCNC: 14 MMOL/L (ref 8–16)
ANION GAP SERPL CALC-SCNC: 17 MMOL/L (ref 8–16)
APTT BLDCRRT: 50.7 SEC (ref 21–32)
ASCENDING AORTA: 2.68 CM
AST SERPL-CCNC: 35 U/L (ref 10–40)
AV INDEX (PROSTH): 0.56
AV MEAN GRADIENT: 2 MMHG
AV PEAK GRADIENT: 4 MMHG
AV VALVE AREA: 1.91 CM2
AV VELOCITY RATIO: 0.51
BACTERIA BLD CULT: NORMAL
BASOPHILS # BLD AUTO: 0.06 K/UL (ref 0–0.2)
BASOPHILS NFR BLD: 0.4 % (ref 0–1.9)
BILIRUB SERPL-MCNC: 0.8 MG/DL (ref 0.1–1)
BLD GP AB SCN CELLS X3 SERPL QL: NORMAL
BNP SERPL-MCNC: 398 PG/ML (ref 0–99)
BSA FOR ECHO PROCEDURE: 2.22 M2
BUN SERPL-MCNC: 54 MG/DL (ref 6–20)
BUN SERPL-MCNC: 57 MG/DL (ref 6–20)
CA-I BLDV-SCNC: 1.08 MMOL/L (ref 1.06–1.42)
CALCIUM SERPL-MCNC: 10.1 MG/DL (ref 8.7–10.5)
CALCIUM SERPL-MCNC: 9.9 MG/DL (ref 8.7–10.5)
CHLORIDE SERPL-SCNC: 93 MMOL/L (ref 95–110)
CHLORIDE SERPL-SCNC: 94 MMOL/L (ref 95–110)
CO2 SERPL-SCNC: 20 MMOL/L (ref 23–29)
CO2 SERPL-SCNC: 25 MMOL/L (ref 23–29)
CREAT SERPL-MCNC: 2.1 MG/DL (ref 0.5–1.4)
CREAT SERPL-MCNC: 2.1 MG/DL (ref 0.5–1.4)
CV ECHO LV RWT: 0.22 CM
DELSYS: ABNORMAL
DIFFERENTIAL METHOD: ABNORMAL
DOP CALC AO PEAK VEL: 1.02 M/S
DOP CALC AO VTI: 12.66 CM
DOP CALC LVOT AREA: 3.4 CM2
DOP CALC LVOT DIAMETER: 2.09 CM
DOP CALC LVOT PEAK VEL: 0.52 M/S
DOP CALC LVOT STROKE VOLUME: 24.24 CM3
DOP CALCLVOT PEAK VEL VTI: 7.07 CM
E WAVE DECELERATION TIME: 100.07 MSEC
E/A RATIO: 3.71
E/E' RATIO: 14.86 M/S
ECHO LV POSTERIOR WALL: 0.82 CM (ref 0.6–1.1)
EJECTION FRACTION: 18 %
EOSINOPHIL # BLD AUTO: 0.1 K/UL (ref 0–0.5)
EOSINOPHIL NFR BLD: 0.9 % (ref 0–8)
ERYTHROCYTE [DISTWIDTH] IN BLOOD BY AUTOMATED COUNT: 14.8 % (ref 11.5–14.5)
EST. GFR  (AFRICAN AMERICAN): 45.1 ML/MIN/1.73 M^2
EST. GFR  (AFRICAN AMERICAN): 45.1 ML/MIN/1.73 M^2
EST. GFR  (NON AFRICAN AMERICAN): 39 ML/MIN/1.73 M^2
EST. GFR  (NON AFRICAN AMERICAN): 39 ML/MIN/1.73 M^2
FIO2: 36
FLOW: 4
FRACTIONAL SHORTENING: 5 % (ref 28–44)
GLUCOSE SERPL-MCNC: 167 MG/DL (ref 70–110)
GLUCOSE SERPL-MCNC: 167 MG/DL (ref 70–110)
HCO3 UR-SCNC: 28.6 MMOL/L (ref 24–28)
HCO3 UR-SCNC: 28.9 MMOL/L (ref 24–28)
HCO3 UR-SCNC: 29.6 MMOL/L (ref 24–28)
HCO3 UR-SCNC: 30.8 MMOL/L (ref 24–28)
HCO3 UR-SCNC: 31.6 MMOL/L (ref 24–28)
HCT VFR BLD AUTO: 29.6 % (ref 40–54)
HGB BLD-MCNC: 9.9 G/DL (ref 14–18)
IMM GRANULOCYTES # BLD AUTO: 0.07 K/UL (ref 0–0.04)
IMM GRANULOCYTES NFR BLD AUTO: 0.5 % (ref 0–0.5)
INTERVENTRICULAR SEPTUM: 0.71 CM (ref 0.6–1.1)
LA MAJOR: 7.08 CM
LA MINOR: 6.79 CM
LA WIDTH: 6.23 CM
LACTATE SERPL-SCNC: 1 MMOL/L (ref 0.5–2.2)
LACTATE SERPL-SCNC: 1 MMOL/L (ref 0.5–2.2)
LDH SERPL L TO P-CCNC: 526 U/L (ref 110–260)
LEFT ATRIUM SIZE: 3.7 CM
LEFT ATRIUM VOLUME INDEX MOD: 75.9 ML/M2
LEFT ATRIUM VOLUME INDEX: 61.2 ML/M2
LEFT ATRIUM VOLUME MOD: 168.4 CM3
LEFT ATRIUM VOLUME: 135.82 CM3
LEFT INTERNAL DIMENSION IN SYSTOLE: 7.07 CM (ref 2.1–4)
LEFT VENTRICLE DIASTOLIC VOLUME INDEX: 132.62 ML/M2
LEFT VENTRICLE DIASTOLIC VOLUME: 294.41 ML
LEFT VENTRICLE MASS INDEX: 117 G/M2
LEFT VENTRICLE SYSTOLIC VOLUME INDEX: 117.7 ML/M2
LEFT VENTRICLE SYSTOLIC VOLUME: 261.27 ML
LEFT VENTRICULAR INTERNAL DIMENSION IN DIASTOLE: 7.46 CM (ref 3.5–6)
LEFT VENTRICULAR MASS: 259.69 G
LV LATERAL E/E' RATIO: 11.56 M/S
LV SEPTAL E/E' RATIO: 20.8 M/S
LYMPHOCYTES # BLD AUTO: 2.9 K/UL (ref 1–4.8)
LYMPHOCYTES NFR BLD: 21.2 % (ref 18–48)
MAGNESIUM SERPL-MCNC: 2.5 MG/DL (ref 1.6–2.6)
MCH RBC QN AUTO: 29.8 PG (ref 27–31)
MCHC RBC AUTO-ENTMCNC: 33.4 G/DL (ref 32–36)
MCV RBC AUTO: 89 FL (ref 82–98)
METHEMOGLOBIN: 0.1 % (ref 0–3)
MODE: ABNORMAL
MONOCYTES # BLD AUTO: 0.9 K/UL (ref 0.3–1)
MONOCYTES NFR BLD: 6.5 % (ref 4–15)
MV PEAK A VEL: 0.28 M/S
MV PEAK E VEL: 1.04 M/S
MV STENOSIS PRESSURE HALF TIME: 29.02 MS
MV VALVE AREA P 1/2 METHOD: 7.58 CM2
NEUTROPHILS # BLD AUTO: 9.7 K/UL (ref 1.8–7.7)
NEUTROPHILS NFR BLD: 70.5 % (ref 38–73)
NRBC BLD-RTO: 0 /100 WBC
PCO2 BLDA: 51 MMHG (ref 35–45)
PCO2 BLDA: 51.8 MMHG (ref 35–45)
PCO2 BLDA: 52 MMHG (ref 35–45)
PCO2 BLDA: 52.2 MMHG (ref 35–45)
PCO2 BLDA: 57 MMHG (ref 35–45)
PH SMN: 7.35 [PH] (ref 7.35–7.45)
PH SMN: 7.37 [PH] (ref 7.35–7.45)
PH SMN: 7.38 [PH] (ref 7.35–7.45)
PISA MRMAX VEL: 0.04 M/S
PISA TR MAX VEL: 3.84 M/S
PLATELET # BLD AUTO: 427 K/UL (ref 150–450)
PMV BLD AUTO: 9.7 FL (ref 9.2–12.9)
PO2 BLDA: 21 MMHG (ref 40–60)
PO2 BLDA: 24 MMHG (ref 40–60)
PO2 BLDA: 26 MMHG (ref 40–60)
PO2 BLDA: 26 MMHG (ref 40–60)
PO2 BLDA: 30 MMHG (ref 40–60)
POC BE: 3 MMOL/L
POC BE: 3 MMOL/L
POC BE: 4 MMOL/L
POC BE: 6 MMOL/L
POC BE: 6 MMOL/L
POC SATURATED O2: 31 % (ref 95–100)
POC SATURATED O2: 40 % (ref 95–100)
POC SATURATED O2: 44 % (ref 95–100)
POC SATURATED O2: 46 % (ref 95–100)
POC SATURATED O2: 53 % (ref 95–100)
POC TCO2: 30 MMOL/L (ref 24–29)
POC TCO2: 30 MMOL/L (ref 24–29)
POC TCO2: 31 MMOL/L (ref 24–29)
POC TCO2: 32 MMOL/L (ref 24–29)
POC TCO2: 33 MMOL/L (ref 24–29)
POCT GLUCOSE: 175 MG/DL (ref 70–110)
POCT GLUCOSE: 186 MG/DL (ref 70–110)
POCT GLUCOSE: 194 MG/DL (ref 70–110)
POCT GLUCOSE: 215 MG/DL (ref 70–110)
POCT GLUCOSE: 245 MG/DL (ref 70–110)
POCT GLUCOSE: 293 MG/DL (ref 70–110)
POCT GLUCOSE: 329 MG/DL (ref 70–110)
POCT GLUCOSE: 339 MG/DL (ref 70–110)
POCT GLUCOSE: 339 MG/DL (ref 70–110)
POCT GLUCOSE: 448 MG/DL (ref 70–110)
POTASSIUM SERPL-SCNC: 4.1 MMOL/L (ref 3.5–5.1)
POTASSIUM SERPL-SCNC: 4.2 MMOL/L (ref 3.5–5.1)
PROT SERPL-MCNC: 8.2 G/DL (ref 6–8.4)
RA MAJOR: 7.28 CM
RA PRESSURE: 15 MMHG
RA WIDTH: 5.24 CM
RBC # BLD AUTO: 3.32 M/UL (ref 4.6–6.2)
RIGHT VENTRICULAR END-DIASTOLIC DIMENSION: 6.7 CM
SAMPLE: ABNORMAL
SINUS: 2.6 CM
SITE: ABNORMAL
SODIUM SERPL-SCNC: 131 MMOL/L (ref 136–145)
SODIUM SERPL-SCNC: 132 MMOL/L (ref 136–145)
SP02: 100
SP02: 98
STJ: 2.1 CM
TDI LATERAL: 0.09 M/S
TDI SEPTAL: 0.05 M/S
TDI: 0.07 M/S
TR MAX PG: 59 MMHG
TRICUSPID ANNULAR PLANE SYSTOLIC EXCURSION: 0.99 CM
TV REST PULMONARY ARTERY PRESSURE: 74 MMHG
VANCOMYCIN SERPL-MCNC: 15.9 UG/ML
WBC # BLD AUTO: 13.76 K/UL (ref 3.9–12.7)

## 2022-06-28 PROCEDURE — 82330 ASSAY OF CALCIUM: CPT | Mod: NTX | Performed by: PHYSICIAN ASSISTANT

## 2022-06-28 PROCEDURE — 25000003 PHARM REV CODE 250: Mod: NTX | Performed by: NURSE PRACTITIONER

## 2022-06-28 PROCEDURE — 99292 PR CRITICAL CARE, ADDL 30 MIN: ICD-10-PCS | Mod: NTX,,, | Performed by: INTERNAL MEDICINE

## 2022-06-28 PROCEDURE — 99292 CRITICAL CARE ADDL 30 MIN: CPT | Mod: NTX,,, | Performed by: INTERNAL MEDICINE

## 2022-06-28 PROCEDURE — 80048 BASIC METABOLIC PNL TOTAL CA: CPT | Mod: XB | Performed by: INTERNAL MEDICINE

## 2022-06-28 PROCEDURE — 63600175 PHARM REV CODE 636 W HCPCS: Mod: NTX | Performed by: STUDENT IN AN ORGANIZED HEALTH CARE EDUCATION/TRAINING PROGRAM

## 2022-06-28 PROCEDURE — 25000003 PHARM REV CODE 250: Mod: NTX | Performed by: STUDENT IN AN ORGANIZED HEALTH CARE EDUCATION/TRAINING PROGRAM

## 2022-06-28 PROCEDURE — 25000003 PHARM REV CODE 250: Mod: NTX | Performed by: INTERNAL MEDICINE

## 2022-06-28 PROCEDURE — 63600175 PHARM REV CODE 636 W HCPCS: Mod: NTX | Performed by: NURSE PRACTITIONER

## 2022-06-28 PROCEDURE — 27000221 HC OXYGEN, UP TO 24 HOURS: Mod: NTX

## 2022-06-28 PROCEDURE — 80053 COMPREHEN METABOLIC PANEL: CPT | Mod: NTX | Performed by: NURSE PRACTITIONER

## 2022-06-28 PROCEDURE — 63600175 PHARM REV CODE 636 W HCPCS: Mod: NTX | Performed by: INTERNAL MEDICINE

## 2022-06-28 PROCEDURE — 86850 RBC ANTIBODY SCREEN: CPT | Performed by: INTERNAL MEDICINE

## 2022-06-28 PROCEDURE — 86900 BLOOD TYPING SEROLOGIC ABO: CPT | Performed by: INTERNAL MEDICINE

## 2022-06-28 PROCEDURE — 25000003 PHARM REV CODE 250: Mod: NTX | Performed by: PHYSICIAN ASSISTANT

## 2022-06-28 PROCEDURE — 99232 SBSQ HOSP IP/OBS MODERATE 35: CPT | Mod: NTX,,, | Performed by: NURSE PRACTITIONER

## 2022-06-28 PROCEDURE — 25500020 PHARM REV CODE 255: Mod: NTX | Performed by: INTERNAL MEDICINE

## 2022-06-28 PROCEDURE — 99900035 HC TECH TIME PER 15 MIN (STAT): Mod: NTX

## 2022-06-28 PROCEDURE — 85730 THROMBOPLASTIN TIME PARTIAL: CPT | Mod: NTX | Performed by: INTERNAL MEDICINE

## 2022-06-28 PROCEDURE — 80202 ASSAY OF VANCOMYCIN: CPT | Performed by: INTERNAL MEDICINE

## 2022-06-28 PROCEDURE — 99232 PR SUBSEQUENT HOSPITAL CARE,LEVL II: ICD-10-PCS | Mod: NTX,,, | Performed by: NURSE PRACTITIONER

## 2022-06-28 PROCEDURE — 83735 ASSAY OF MAGNESIUM: CPT | Mod: NTX | Performed by: INTERNAL MEDICINE

## 2022-06-28 PROCEDURE — 86920 COMPATIBILITY TEST SPIN: CPT | Performed by: NURSE PRACTITIONER

## 2022-06-28 PROCEDURE — 63600367 HC NITRIC OXIDE PER HOUR: Mod: NTX

## 2022-06-28 PROCEDURE — 99291 PR CRITICAL CARE, E/M 30-74 MINUTES: ICD-10-PCS | Mod: NTX,,, | Performed by: NURSE PRACTITIONER

## 2022-06-28 PROCEDURE — 82803 BLOOD GASES ANY COMBINATION: CPT | Mod: NTX

## 2022-06-28 PROCEDURE — 99291 CRITICAL CARE FIRST HOUR: CPT | Mod: NTX,,, | Performed by: NURSE PRACTITIONER

## 2022-06-28 PROCEDURE — 63600175 PHARM REV CODE 636 W HCPCS: Mod: NTX | Performed by: PHYSICIAN ASSISTANT

## 2022-06-28 PROCEDURE — 83605 ASSAY OF LACTIC ACID: CPT | Mod: NTX | Performed by: PHYSICIAN ASSISTANT

## 2022-06-28 PROCEDURE — 20000000 HC ICU ROOM: Mod: NTX

## 2022-06-28 PROCEDURE — 83615 LACTATE (LD) (LDH) ENZYME: CPT | Mod: NTX | Performed by: PHYSICIAN ASSISTANT

## 2022-06-28 PROCEDURE — 85025 COMPLETE CBC W/AUTO DIFF WBC: CPT | Mod: NTX | Performed by: INTERNAL MEDICINE

## 2022-06-28 PROCEDURE — 83050 HGB METHEMOGLOBIN QUAN: CPT | Mod: NTX

## 2022-06-28 PROCEDURE — 94761 N-INVAS EAR/PLS OXIMETRY MLT: CPT | Mod: NTX

## 2022-06-28 PROCEDURE — 86920 COMPATIBILITY TEST SPIN: CPT | Performed by: THORACIC SURGERY (CARDIOTHORACIC VASCULAR SURGERY)

## 2022-06-28 PROCEDURE — 83880 ASSAY OF NATRIURETIC PEPTIDE: CPT | Mod: NTX | Performed by: NURSE PRACTITIONER

## 2022-06-28 PROCEDURE — 27000248 HC VAD-ADDITIONAL DAY: Mod: NTX

## 2022-06-28 RX ORDER — INSULIN ASPART 100 [IU]/ML
5 INJECTION, SOLUTION INTRAVENOUS; SUBCUTANEOUS ONCE
Status: COMPLETED | OUTPATIENT
Start: 2022-06-28 | End: 2022-06-28

## 2022-06-28 RX ORDER — DOBUTAMINE HYDROCHLORIDE 400 MG/100ML
4 INJECTION INTRAVENOUS CONTINUOUS
Status: DISCONTINUED | OUTPATIENT
Start: 2022-06-28 | End: 2022-07-06

## 2022-06-28 RX ORDER — MILRINONE LACTATE 0.2 MG/ML
INJECTION, SOLUTION INTRAVENOUS
Status: DISPENSED
Start: 2022-06-28 | End: 2022-06-29

## 2022-06-28 RX ORDER — FUROSEMIDE 10 MG/ML
80 INJECTION INTRAMUSCULAR; INTRAVENOUS ONCE
Status: COMPLETED | OUTPATIENT
Start: 2022-06-28 | End: 2022-06-28

## 2022-06-28 RX ADMIN — ALPRAZOLAM 0.25 MG: 0.25 TABLET ORAL at 12:06

## 2022-06-28 RX ADMIN — EPINEPHRINE 0.04 MCG/KG/MIN: 1 INJECTION INTRAMUSCULAR; INTRAVENOUS; SUBCUTANEOUS at 08:06

## 2022-06-28 RX ADMIN — POTASSIUM CHLORIDE 30 MEQ: 10 CAPSULE, COATED, EXTENDED RELEASE ORAL at 09:06

## 2022-06-28 RX ADMIN — INSULIN ASPART 12 UNITS: 100 INJECTION, SOLUTION INTRAVENOUS; SUBCUTANEOUS at 06:06

## 2022-06-28 RX ADMIN — METHOCARBAMOL TABLETS 500 MG: 500 TABLET, COATED ORAL at 09:06

## 2022-06-28 RX ADMIN — PANTOPRAZOLE SODIUM 40 MG: 40 TABLET, DELAYED RELEASE ORAL at 09:06

## 2022-06-28 RX ADMIN — FUROSEMIDE 80 MG: 10 INJECTION, SOLUTION INTRAMUSCULAR; INTRAVENOUS at 08:06

## 2022-06-28 RX ADMIN — MILRINONE LACTATE IN DEXTROSE 0.38 MCG/KG/MIN: 200 INJECTION, SOLUTION INTRAVENOUS at 05:06

## 2022-06-28 RX ADMIN — MILRINONE LACTATE IN DEXTROSE 0.38 MCG/KG/MIN: 200 INJECTION, SOLUTION INTRAVENOUS at 02:06

## 2022-06-28 RX ADMIN — GABAPENTIN 300 MG: 300 CAPSULE ORAL at 09:06

## 2022-06-28 RX ADMIN — ACETAMINOPHEN 650 MG: 325 TABLET ORAL at 03:06

## 2022-06-28 RX ADMIN — ACETAMINOPHEN 650 MG: 325 TABLET ORAL at 09:06

## 2022-06-28 RX ADMIN — INSULIN ASPART 4 UNITS: 100 INJECTION, SOLUTION INTRAVENOUS; SUBCUTANEOUS at 05:06

## 2022-06-28 RX ADMIN — METHOCARBAMOL TABLETS 500 MG: 500 TABLET, COATED ORAL at 12:06

## 2022-06-28 RX ADMIN — BUSPIRONE HYDROCHLORIDE 7.5 MG: 5 TABLET ORAL at 08:06

## 2022-06-28 RX ADMIN — SUCRALFATE 1 G: 1 TABLET ORAL at 08:06

## 2022-06-28 RX ADMIN — INSULIN ASPART 2 UNITS: 100 INJECTION, SOLUTION INTRAVENOUS; SUBCUTANEOUS at 12:06

## 2022-06-28 RX ADMIN — METHOCARBAMOL TABLETS 500 MG: 500 TABLET, COATED ORAL at 08:06

## 2022-06-28 RX ADMIN — ACETAMINOPHEN 650 MG: 325 TABLET ORAL at 10:06

## 2022-06-28 RX ADMIN — HUMAN ALBUMIN MICROSPHERES AND PERFLUTREN 0.66 MG: 10; .22 INJECTION, SOLUTION INTRAVENOUS at 09:06

## 2022-06-28 RX ADMIN — HYDROCODONE BITARTRATE AND ACETAMINOPHEN 1 TABLET: 5; 325 TABLET ORAL at 10:06

## 2022-06-28 RX ADMIN — GABAPENTIN 300 MG: 300 CAPSULE ORAL at 03:06

## 2022-06-28 RX ADMIN — INSULIN ASPART 5 UNITS: 100 INJECTION, SOLUTION INTRAVENOUS; SUBCUTANEOUS at 08:06

## 2022-06-28 RX ADMIN — MIRTAZAPINE 15 MG: 15 TABLET, FILM COATED ORAL at 08:06

## 2022-06-28 RX ADMIN — INSULIN ASPART 2 UNITS: 100 INJECTION, SOLUTION INTRAVENOUS; SUBCUTANEOUS at 06:06

## 2022-06-28 RX ADMIN — HYDROCODONE BITARTRATE AND ACETAMINOPHEN 1 TABLET: 5; 325 TABLET ORAL at 03:06

## 2022-06-28 RX ADMIN — POTASSIUM CHLORIDE 30 MEQ: 10 CAPSULE, COATED, EXTENDED RELEASE ORAL at 08:06

## 2022-06-28 RX ADMIN — INSULIN ASPART 4 UNITS: 100 INJECTION, SOLUTION INTRAVENOUS; SUBCUTANEOUS at 09:06

## 2022-06-28 RX ADMIN — VANCOMYCIN HYDROCHLORIDE 1250 MG: 1.25 INJECTION, POWDER, LYOPHILIZED, FOR SOLUTION INTRAVENOUS at 09:06

## 2022-06-28 RX ADMIN — ASPIRIN 81 MG CHEWABLE TABLET 81 MG: 81 TABLET CHEWABLE at 09:06

## 2022-06-28 RX ADMIN — GABAPENTIN 300 MG: 300 CAPSULE ORAL at 08:06

## 2022-06-28 RX ADMIN — FUROSEMIDE 10 MG/HR: 10 INJECTION, SOLUTION INTRAMUSCULAR; INTRAVENOUS at 09:06

## 2022-06-28 RX ADMIN — INSULIN ASPART 5 UNITS: 100 INJECTION, SOLUTION INTRAVENOUS; SUBCUTANEOUS at 09:06

## 2022-06-28 RX ADMIN — POLYETHYLENE GLYCOL 3350 17 G: 17 POWDER, FOR SOLUTION ORAL at 09:06

## 2022-06-28 RX ADMIN — INSULIN ASPART 12 UNITS: 100 INJECTION, SOLUTION INTRAVENOUS; SUBCUTANEOUS at 12:06

## 2022-06-28 RX ADMIN — PHYTONADIONE 10 MG: 10 INJECTION, EMULSION INTRAMUSCULAR; INTRAVENOUS; SUBCUTANEOUS at 06:06

## 2022-06-28 RX ADMIN — INSULIN HUMAN 1.5 UNITS/HR: 1 INJECTION, SOLUTION INTRAVENOUS at 09:06

## 2022-06-28 RX ADMIN — INSULIN ASPART 8 UNITS: 100 INJECTION, SOLUTION INTRAVENOUS; SUBCUTANEOUS at 07:06

## 2022-06-28 RX ADMIN — ALPRAZOLAM 0.25 MG: 0.25 TABLET ORAL at 08:06

## 2022-06-28 RX ADMIN — INSULIN ASPART 12 UNITS: 100 INJECTION, SOLUTION INTRAVENOUS; SUBCUTANEOUS at 09:06

## 2022-06-28 RX ADMIN — HYDROCODONE BITARTRATE AND ACETAMINOPHEN 1 TABLET: 5; 325 TABLET ORAL at 09:06

## 2022-06-28 RX ADMIN — HEPARIN SODIUM 22 UNITS/KG/HR: 5000 INJECTION INTRAVENOUS; SUBCUTANEOUS at 03:06

## 2022-06-28 RX ADMIN — METHOCARBAMOL TABLETS 500 MG: 500 TABLET, COATED ORAL at 06:06

## 2022-06-28 RX ADMIN — BUSPIRONE HYDROCHLORIDE 7.5 MG: 5 TABLET ORAL at 09:06

## 2022-06-28 RX ADMIN — SENNOSIDES AND DOCUSATE SODIUM 1 TABLET: 50; 8.6 TABLET ORAL at 09:06

## 2022-06-28 RX ADMIN — HEPARIN SODIUM 22 UNITS/KG/HR: 5000 INJECTION INTRAVENOUS; SUBCUTANEOUS at 04:06

## 2022-06-28 RX ADMIN — PHYTONADIONE 10 MG: 10 INJECTION, EMULSION INTRAMUSCULAR; INTRAVENOUS; SUBCUTANEOUS at 11:06

## 2022-06-28 RX ADMIN — EPINEPHRINE 0.04 MCG/KG/MIN: 1 INJECTION INTRAMUSCULAR; INTRAVENOUS; SUBCUTANEOUS at 12:06

## 2022-06-28 RX ADMIN — DOBUTAMINE 5 MCG/KG/MIN: 12.5 INJECTION, SOLUTION, CONCENTRATE INTRAVENOUS at 03:06

## 2022-06-28 NOTE — ASSESSMENT & PLAN NOTE
-Admitted 5/24-5/27 with hyperglycemia hyperosmolar syndrome  -Hgb A1C 9.2 on 5/20/22  -Endocrine following, on insulin gtt

## 2022-06-28 NOTE — NURSING
Patient's spouse at bedside and silencing alarms. According to charge nurse multiple nurses have attempted to speak with the spouse and patient about the behavior displayed with no resolution. After primary RN witnessed spouse silencing alarm she went and spoke with wife who immediately became angry and offensive with language. Shortly after the patient wanted to use urinal at which point the spouse completely closed the curtain in the room. Primary nurse entered room and asked patient and spouse to leave a space in between the curtain and the wall so nurse can see the monitors. At this point the patient became irate, sitting up in bed, yelling, and cursing at nurse. Security was notified and charge RN at bedside to speak with patient.

## 2022-06-28 NOTE — SUBJECTIVE & OBJECTIVE
**Interval History: sVO2 was in the 30's overnight. sVO2 44 this morning with CO/CI 4.37/1.97 and SVR 1720 - increased Marcella to 20 ppm. AG 17 but serum lactic acid 1.0. CVP 9. sCr still 2.1 and net -ve 1L past 24 hours since decreasing Lasix to 10 mg/hr yesterday - D/C Aldactone. CXR relatively clear, BNP stable at 398. TTE this morning shows IVC still 15. Plan to repeat labs and HD at noon.He has no complaints, eating well and had BM overnight.     Continuous Infusions:   sodium chloride 0.9% 5 mL/hr at 06/27/22 0055    EPINEPHrine 0.04 mcg/kg/min (06/28/22 0900)    furosemide (LASIX) 10 mg/mL infusion (non-titrating) 10 mg/hr (06/28/22 0900)    heparin (porcine) in D5W 22 Units/kg/hr (06/28/22 0900)    insulin regular 1 units/mL infusion orderable (TRANSFER) 1.5 Units/hr (06/28/22 0900)    milrinone 20mg/100ml D5W (200mcg/ml) 0.375 mcg/kg/min (06/28/22 0900)    nitric oxide gas       Scheduled Meds:   acetaminophen  650 mg Oral Q8H    aspirin  81 mg Oral Daily    busPIRone  7.5 mg Oral Q12H    gabapentin  300 mg Oral TID    HYDROcodone-acetaminophen  1 tablet Oral Q8H    insulin aspart U-100  8-12 Units Subcutaneous TIDWM    LIDOcaine HCL 20 mg/ml (2%)  20 mL Other Once    methocarbamoL  500 mg Oral QID    mirtazapine  15 mg Oral Nightly    pantoprazole  40 mg Oral Daily    polyethylene glycol  17 g Oral BID    potassium chloride  30 mEq Oral BID    senna-docusate 8.6-50 mg  1 tablet Oral Daily    sucralfate  1 g Oral Nightly     PRN Meds:sodium chloride, sodium chloride, sodium chloride, ALPRAZolam, bisacodyL, dextrose 10%, dextrose 10%, diphenhydrAMINE, glucagon (human recombinant), glucose, glucose, insulin aspart U-100, magnesium oxide, magnesium oxide, mupirocin, potassium bicarbonate, potassium bicarbonate, potassium bicarbonate, potassium, sodium phosphates, potassium, sodium phosphates, potassium, sodium phosphates, promethazine, sodium chloride 0.9%, sodium chloride 0.9%, Pharmacy to dose Vancomycin consult  **AND** vancomycin - pharmacy to dose    Review of patient's allergies indicates:   Allergen Reactions    Aspirin Other (See Comments)     Mr. Thacker is enrolled in Dr. Paige's Alon Trial and cannot have any aspirin and/or aspirin-containing products. DO NOT cancel any orders for INV Aspirin 100 mg/Placebo. If you have questions, please contact Isabel @ [phone], [phone],[email or web address], secure chat or MS Teams message.    Bumex [bumetanide] Hives    Lactose Diarrhea     Other reaction(s): Abdominal distension, gaseous    Torsemide Hives     Objective:     Vital Signs (Most Recent):  Temp: 98.1 °F (36.7 °C) (06/28/22 0700)  Pulse: 105 (06/28/22 0900)  Resp: 18 (06/28/22 0924)  BP: 129/69 (06/28/22 0900)  SpO2: 100 % (06/28/22 0900) Vital Signs (24h Range):  Temp:  [97.8 °F (36.6 °C)-98.2 °F (36.8 °C)] 98.1 °F (36.7 °C)  Pulse:  [] 105  Resp:  [12-41] 18  SpO2:  [94 %-100 %] 100 %  BP: (103-151)/(60-91) 129/69     Patient Vitals for the past 72 hrs (Last 3 readings):   Weight   06/28/22 0756 93.4 kg (206 lb)   06/27/22 0300 93.6 kg (206 lb 5.6 oz)   06/26/22 0300 96.7 kg (213 lb 3 oz)     Body mass index is 25.75 kg/m².      Intake/Output Summary (Last 24 hours) at 6/28/2022 1111  Last data filed at 6/28/2022 1000  Gross per 24 hour   Intake 2047.43 ml   Output 3896 ml   Net -1848.57 ml       Hemodynamic Parameters:       Telemetry: ST    Physical Exam  Constitutional:       Appearance: Normal appearance.   HENT:      Head: Normocephalic and atraumatic.   Eyes:      Conjunctiva/sclera: Conjunctivae normal.      Pupils: Pupils are equal, round, and reactive to light.   Neck:      Comments: RIJ TLC  Cardiovascular:      Rate and Rhythm: Regular rhythm. Tachycardia present.      Comments: +S3, grade III/VI systolic murmur at apex  Pulmonary:      Effort: Pulmonary effort is normal.      Breath sounds: Normal breath sounds.   Abdominal:      General: Bowel sounds are normal.      Palpations: Abdomen  is soft.   Musculoskeletal:         General: No swelling. Normal range of motion.      Cervical back: Normal range of motion and neck supple.   Skin:     General: Skin is warm and dry.      Capillary Refill: Capillary refill takes 2 to 3 seconds.   Neurological:      General: No focal deficit present.      Mental Status: He is alert and oriented to person, place, and time.   Psychiatric:         Mood and Affect: Mood normal.         Behavior: Behavior normal.         Thought Content: Thought content normal.         Judgment: Judgment normal.       Significant Labs:  CBC:  Recent Labs   Lab 06/26/22  0303 06/27/22  0308 06/28/22  0329   WBC 15.11* 14.14* 13.76*   RBC 3.56* 3.45* 3.32*   HGB 10.5* 10.3* 9.9*   HCT 31.1* 30.5* 29.6*    451* 427   MCV 87 88 89   MCH 29.5 29.9 29.8   MCHC 33.8 33.8 33.4     BNP:  Recent Labs   Lab 06/28/22  0921   *     CMP:  Recent Labs   Lab 06/26/22  0303 06/26/22  1420 06/27/22  0308 06/27/22  1510 06/28/22  0329   *   < > 255* 142* 167*   CALCIUM 10.3   < > 10.4 10.1 10.1   ALBUMIN 3.4*  --  3.6  --  3.5   PROT 8.2  --  8.6*  --  8.2   *   < > 130* 133* 131*   K 3.5   < > 3.6 4.5 4.1   CO2 27   < > 27 21* 20*   CL 91*   < > 89* 92* 94*   BUN 40*   < > 51* 55* 57*   CREATININE 1.8*   < > 2.1* 1.9* 2.1*   ALKPHOS 155*  --  151*  --  153*   ALT 29  --  27  --  27   AST 30  --  28  --  35   BILITOT 1.1*  --  0.9  --  0.8    < > = values in this interval not displayed.      Coagulation:   Recent Labs   Lab 06/27/22  1118 06/27/22  1716 06/28/22  0329   APTT 49.1* 48.0* 50.7*     LDH:  Recent Labs   Lab 06/26/22  0303 06/27/22  0308 06/28/22  0329   * 350* 526*     Microbiology:  Microbiology Results (last 7 days)       Procedure Component Value Units Date/Time    Blood culture [[id number]] Collected: 06/25/22 0634    Order Status: Completed Specimen: Blood from Peripheral, Wrist, Left Updated: 06/28/22 0812     Blood Culture, Routine No Growth to date       No Growth to date      No Growth to date      No Growth to date    Blood culture [[id number]] Collected: 06/25/22 0633    Order Status: Completed Specimen: Blood from Peripheral, Antecubital, Right Updated: 06/28/22 0812     Blood Culture, Routine No Growth to date      No Growth to date      No Growth to date      No Growth to date    Blood culture [[id number]] Collected: 06/23/22 0105    Order Status: Completed Specimen: Blood from Peripheral, Antecubital, Right Updated: 06/28/22 0612     Blood Culture, Routine No growth after 5 days.    Blood culture [[id number]]  (Abnormal)  (Susceptibility) Collected: 06/23/22 0108    Order Status: Completed Specimen: Blood from Peripheral, Hand, Right Updated: 06/27/22 1116     Blood Culture, Routine Gram stain dudley bottle: Gram positive cocci in clusters resembling Staph       Results called to and read back by: Mark Pickett RN  06/24/2022        14:36      STAPHYLOCOCCUS EPIDERMIDIS    Blood culture [[id number]]  (Abnormal)  (Susceptibility) Collected: 06/21/22 2250    Order Status: Completed Specimen: Blood from Peripheral, Wrist, Left Updated: 06/26/22 1059     Blood Culture, Routine Gram stain dudley bottle: Gram positive cocci in clusters resembling Staph      Results called to and read back by: Rosa Pardo RN. 06/22/2022  23:29      Gram stain aer bottle: Gram positive cocci in clusters resembling Staph       Positive results previously called 06/23/2022  04:33      STAPHYLOCOCCUS EPIDERMIDIS    Blood culture [[id number]]  (Abnormal)  (Susceptibility) Collected: 06/21/22 2250    Order Status: Completed Specimen: Blood from Peripheral, Wrist, Left Updated: 06/25/22 1029     Blood Culture, Routine Gram stain dudley bottle: Gram positive cocci      Results called to and read back by: Arnulfo Herrera RN. 06/22/2022  17:46      Gram stain aer bottle: Gram positive cocci in clusters resembling Staph       Positive results previously called 06/23/2022  00:11      STAPHYLOCOCCUS  EPIDERMIDIS    Blood culture [[id number]]  (Abnormal)  (Susceptibility) Collected: 06/20/22 1651    Order Status: Completed Specimen: Blood from Peripheral, Hand, Left Updated: 06/25/22 1023     Blood Culture, Routine Gram stain dudley bottle: Gram positive cocci in clusters resembling Staph      Results called to and read back by:Tara López RN 06/21/2022  22:16      STAPHYLOCOCCUS EPIDERMIDIS    Blood culture [[id number]]     Order Status: Canceled Specimen: Blood             I have reviewed all pertinent labs within the past 24 hours.    Estimated Creatinine Clearance: 57.6 mL/min (A) (based on SCr of 2.1 mg/dL (H)).    Diagnostic Results:  I have reviewed and interpreted all pertinent imaging results/findings within the past 24 hours.

## 2022-06-28 NOTE — PROGRESS NOTES
Patient scheduled for HeartMate 3 VAD placement tomorrow. Resting in bed on IABP support, simeon ROMAN sleeping at bedside. Patient states he is getting restless and is ready to get the surgery over with. No questions at this time. Will continue to follow.

## 2022-06-28 NOTE — PROGRESS NOTES
[facility] - Card[facility]  Heart Transplant  Progress Note    Patient Name: Kevan Queen  MRN: [id number]  Admission Date: 6/13/2022  Hospital Length of Stay: 15 days  Attending Physician: Angela Yang DO  Primary Care Provider: ORALIA Cline  Principal Problem:Acute on chronic combined systolic and diastolic heart failure, NYHA class 4    Subjective:     **Interval History: sVO2 was in the 30's overnight. sVO2 44 this morning with CO/CI 4.37/1.97 and SVR 1720 - increased Marcella to 20 ppm. AG 17 but serum lactic acid 1.0. CVP 9. sCr still 2.1 and net -ve 1L past 24 hours since decreasing Lasix to 10 mg/hr yesterday - D/C Aldactone. CXR relatively clear, BNP stable at 398. TTE this morning shows IVC still 15. Plan to repeat labs and HD at noon.He has no complaints, eating well and had BM overnight.     Continuous Infusions:   sodium chloride 0.9% 5 mL/hr at 06/27/22 0055    EPINEPHrine 0.04 mcg/kg/min (06/28/22 0900)    furosemide (LASIX) 10 mg/mL infusion (non-titrating) 10 mg/hr (06/28/22 0900)    heparin (porcine) in D5W 22 Units/kg/hr (06/28/22 0900)    insulin regular 1 units/mL infusion orderable (TRANSFER) 1.5 Units/hr (06/28/22 0900)    milrinone 20mg/100ml D5W (200mcg/ml) 0.375 mcg/kg/min (06/28/22 0900)    nitric oxide gas       Scheduled Meds:   acetaminophen  650 mg Oral Q8H    aspirin  81 mg Oral Daily    busPIRone  7.5 mg Oral Q12H    gabapentin  300 mg Oral TID    HYDROcodone-acetaminophen  1 tablet Oral Q8H    insulin aspart U-100  8-12 Units Subcutaneous TIDWM    LIDOcaine HCL 20 mg/ml (2%)  20 mL Other Once    methocarbamoL  500 mg Oral QID    mirtazapine  15 mg Oral Nightly    pantoprazole  40 mg Oral Daily    polyethylene glycol  17 g Oral BID    potassium chloride  30 mEq Oral BID    senna-docusate 8.6-50 mg  1 tablet Oral Daily    sucralfate  1 g Oral Nightly     PRN Meds:sodium chloride, sodium chloride, sodium chloride, ALPRAZolam, bisacodyL, dextrose  10%, dextrose 10%, diphenhydrAMINE, glucagon (human recombinant), glucose, glucose, insulin aspart U-100, magnesium oxide, magnesium oxide, mupirocin, potassium bicarbonate, potassium bicarbonate, potassium bicarbonate, potassium, sodium phosphates, potassium, sodium phosphates, potassium, sodium phosphates, promethazine, sodium chloride 0.9%, sodium chloride 0.9%, Pharmacy to dose Vancomycin consult **AND** vancomycin - pharmacy to dose    Review of patient's allergies indicates:   Allergen Reactions    Aspirin Other (See Comments)     Mr. Thacker is enrolled in Dr. Paige's Alon Trial and cannot have any aspirin and/or aspirin-containing products. DO NOT cancel any orders for INV Aspirin 100 mg/Placebo. If you have questions, please contact Isabel @ [phone], [phone],[email or web address], secure chat or MS Teams message.    Bumex [bumetanide] Hives    Lactose Diarrhea     Other reaction(s): Abdominal distension, gaseous    Torsemide Hives     Objective:     Vital Signs (Most Recent):  Temp: 98.1 °F (36.7 °C) (06/28/22 0700)  Pulse: 105 (06/28/22 0900)  Resp: 18 (06/28/22 0924)  BP: 129/69 (06/28/22 0900)  SpO2: 100 % (06/28/22 0900) Vital Signs (24h Range):  Temp:  [97.8 °F (36.6 °C)-98.2 °F (36.8 °C)] 98.1 °F (36.7 °C)  Pulse:  [] 105  Resp:  [12-41] 18  SpO2:  [94 %-100 %] 100 %  BP: (103-151)/(60-91) 129/69     Patient Vitals for the past 72 hrs (Last 3 readings):   Weight   06/28/22 0756 93.4 kg (206 lb)   06/27/22 0300 93.6 kg (206 lb 5.6 oz)   06/26/22 0300 96.7 kg (213 lb 3 oz)     Body mass index is 25.75 kg/m².      Intake/Output Summary (Last 24 hours) at 6/28/2022 1111  Last data filed at 6/28/2022 1000  Gross per 24 hour   Intake 2047.43 ml   Output 3896 ml   Net -1848.57 ml       Hemodynamic Parameters:       Telemetry: ST    Physical Exam  Constitutional:       Appearance: Normal appearance.   HENT:      Head: Normocephalic and atraumatic.   Eyes:      Conjunctiva/sclera:  Conjunctivae normal.      Pupils: Pupils are equal, round, and reactive to light.   Neck:      Comments: RIJ TLC  Cardiovascular:      Rate and Rhythm: Regular rhythm. Tachycardia present.      Comments: +S3, grade III/VI systolic murmur at apex  Pulmonary:      Effort: Pulmonary effort is normal.      Breath sounds: Normal breath sounds.   Abdominal:      General: Bowel sounds are normal.      Palpations: Abdomen is soft.   Musculoskeletal:         General: No swelling. Normal range of motion.      Cervical back: Normal range of motion and neck supple.   Skin:     General: Skin is warm and dry.      Capillary Refill: Capillary refill takes 2 to 3 seconds.   Neurological:      General: No focal deficit present.      Mental Status: He is alert and oriented to person, place, and time.   Psychiatric:         Mood and Affect: Mood normal.         Behavior: Behavior normal.         Thought Content: Thought content normal.         Judgment: Judgment normal.       Significant Labs:  CBC:  Recent Labs   Lab 06/26/22  0303 06/27/22  0308 06/28/22  0329   WBC 15.11* 14.14* 13.76*   RBC 3.56* 3.45* 3.32*   HGB 10.5* 10.3* 9.9*   HCT 31.1* 30.5* 29.6*    451* 427   MCV 87 88 89   MCH 29.5 29.9 29.8   MCHC 33.8 33.8 33.4     BNP:  Recent Labs   Lab 06/28/22  0921   *     CMP:  Recent Labs   Lab 06/26/22  0303 06/26/22  1420 06/27/22  0308 06/27/22  1510 06/28/22  0329   *   < > 255* 142* 167*   CALCIUM 10.3   < > 10.4 10.1 10.1   ALBUMIN 3.4*  --  3.6  --  3.5   PROT 8.2  --  8.6*  --  8.2   *   < > 130* 133* 131*   K 3.5   < > 3.6 4.5 4.1   CO2 27   < > 27 21* 20*   CL 91*   < > 89* 92* 94*   BUN 40*   < > 51* 55* 57*   CREATININE 1.8*   < > 2.1* 1.9* 2.1*   ALKPHOS 155*  --  151*  --  153*   ALT 29  --  27  --  27   AST 30  --  28  --  35   BILITOT 1.1*  --  0.9  --  0.8    < > = values in this interval not displayed.      Coagulation:   Recent Labs   Lab 06/27/22  1118 06/27/22  2746 06/28/22  1580    APTT 49.1* 48.0* 50.7*     LDH:  Recent Labs   Lab 06/26/22  0303 06/27/22  0308 06/28/22  0329   * 350* 526*     Microbiology:  Microbiology Results (last 7 days)       Procedure Component Value Units Date/Time    Blood culture [[id number]] Collected: 06/25/22 0634    Order Status: Completed Specimen: Blood from Peripheral, Wrist, Left Updated: 06/28/22 0812     Blood Culture, Routine No Growth to date      No Growth to date      No Growth to date      No Growth to date    Blood culture [[id number]] Collected: 06/25/22 0633    Order Status: Completed Specimen: Blood from Peripheral, Antecubital, Right Updated: 06/28/22 0812     Blood Culture, Routine No Growth to date      No Growth to date      No Growth to date      No Growth to date    Blood culture [[id number]] Collected: 06/23/22 0105    Order Status: Completed Specimen: Blood from Peripheral, Antecubital, Right Updated: 06/28/22 0612     Blood Culture, Routine No growth after 5 days.    Blood culture [[id number]]  (Abnormal)  (Susceptibility) Collected: 06/23/22 0108    Order Status: Completed Specimen: Blood from Peripheral, Hand, Right Updated: 06/27/22 1116     Blood Culture, Routine Gram stain dudley bottle: Gram positive cocci in clusters resembling Staph       Results called to and read back by: Mark Pickett RN  06/24/2022        14:36      STAPHYLOCOCCUS EPIDERMIDIS    Blood culture [[id number]]  (Abnormal)  (Susceptibility) Collected: 06/21/22 2250    Order Status: Completed Specimen: Blood from Peripheral, Wrist, Left Updated: 06/26/22 1059     Blood Culture, Routine Gram stain dudley bottle: Gram positive cocci in clusters resembling Staph      Results called to and read back by: Rosa Pardo RN. 06/22/2022  23:29      Gram stain aer bottle: Gram positive cocci in clusters resembling Staph       Positive results previously called 06/23/2022  04:33      STAPHYLOCOCCUS EPIDERMIDIS    Blood culture [[id number]]  (Abnormal)  (Susceptibility)  "Collected: 06/21/22 2250    Order Status: Completed Specimen: Blood from Peripheral, Wrist, Left Updated: 06/25/22 1029     Blood Culture, Routine Gram stain dudley bottle: Gram positive cocci      Results called to and read back by: Arnulfo Herrera RN. 06/22/2022  17:46      Gram stain aer bottle: Gram positive cocci in clusters resembling Staph       Positive results previously called 06/23/2022  00:11      STAPHYLOCOCCUS EPIDERMIDIS    Blood culture [[id number]]  (Abnormal)  (Susceptibility) Collected: 06/20/22 1651    Order Status: Completed Specimen: Blood from Peripheral, Hand, Left Updated: 06/25/22 1023     Blood Culture, Routine Gram stain dudley bottle: Gram positive cocci in clusters resembling Staph      Results called to and read back by:Tara López RN 06/21/2022  22:16      STAPHYLOCOCCUS EPIDERMIDIS    Blood culture [[id number]]     Order Status: Canceled Specimen: Blood             I have reviewed all pertinent labs within the past 24 hours.    Estimated Creatinine Clearance: 57.6 mL/min (A) (based on SCr of 2.1 mg/dL (H)).    Diagnostic Results:  I have reviewed and interpreted all pertinent imaging results/findings within the past 24 hours.    Assessment and Plan:     38 yo male with NIDCM with BiV systolic heart failure, on home Milrinone at 0.375 mcg/kg/min, presented [facility] ,was not evaluated for OHT as he has recently quit smoking in April 2022 but was approved for VAD with plan to begin OHT evaluation in upcoming months if Mr Queen is stable and suitable for OHT eval (blood group A), 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 Went to ED at [facility] 6/10 but left " after waiting 4 hours. Had clinic appointment with us today, but arrived to [facility] early this morning and decided to go to the ED instead. Baseline Lasix dose is 80 mg bid. Reports taking 240 mg qd past 3 days with no improvement. BNP is 1701, up from 898 on 6/2 and 49 on 5/24. sCr is 1.8 with baseline ~ 1.5-1.7. sPO2 on RA is 93%. Wife at bedside    He has been given Lasix 80 mg IVP in the ED with plan to start Lasix gtt at 20 mg/hr           * Acute on chronic combined systolic and diastolic heart failure, NYHA class 4  - [facility]  - Approved for LVAD at [facility] on 4/2/22. Was not evaluated for OHTx as he quit smoking in April 2022  - Moved to ICU 6/13 in the setting of significant volume overloaded and low output state. IABP placed 6/13 and Epi and Marcella added for RV support.   - IABP exchanged 6/23  - Lasix gtt D/C'd 6/17 secondary to increase in sCr, but then was restarted 6/23 at 40 mg/hr, now decreased to 10 mg/hr 6/27. CVP 9 and net -ve 1L past 24 hours. sCr still 2.1. CXR essentially clear, BNP stable at 388.   - sVO2 has been up and down, but was in the 30's overnight and 44 this morning with CO/CI 4.37/1.97 and SVR 1720 with IABP 1:1, IV milrinone 0.375, epi 0.04, and Marcella 10 ppm. Marcella increased to 20 PPM, plan repeat labs and HD at noon  - TTE today: LVEF 18%, LVEDD: 7.46, TAPSE: 0.99, mod to severe MR, mod TR. mod RVE with mod reduced RV failure, PAS > 59 and IVC 15. Pending noon labs and HD, may increase Lasix gtt back to 20 mg/hr  - RHC done 4/19/22 on Milrinone 0.5 mcg/kg/min: RA 11, PA 50/27 (35), W 27, CO/CI 3.7/1.7, PVR 2.2 and SVR 1535   - GDMT: Entresto and Aldactone on hold since admit 5/24-5/27 2/2 elevated sCr. Resumed aldactone 25 mg QD 6/26 but D/C'd today in the setting of sCr 2.1  - Reports dry weight is 217#, and that he had gained 6# on his home scale PTA  - Tentative VAD implant 6/29. Unlikely OHTx candidate with smoking history (quit April 2022)     Staphylococcus epidermidis  bacteremia  -Blood cultures 6/20 and repeat 6/21 positive for Staph Epi. One of two blood cultures from 6/23 positive for Staph (taken prior to line exchange). Repeat cultures sent 6/25 NGTD.   -IJ exchanged on 6/23, IABP exchanged 6/23  -Appreciate ID's help again:   · Continue vancomycin IV, goal 15-20  · Recommend a 14 day course of vancomycin from VAD implantation on 6/29. End date: 7/12/22  · If blood cultures 6/25 become positive then consider GUILLERMO to evaluate for ICD endocarditis.  · No absolute contraindications from ID standpoint to proceed with VAD implant on 6/29 if blood cultures remain no growth.            Muscle cramping  -Continue decreased scheduled Percocet and continue Gabapentin    Uncontrolled diabetes mellitus  -Admitted 5/24-5/27 with hyperglycemia hyperosmolar syndrome  -Hgb A1C 9.2 on 5/20/22  -Endocrine following, on insulin gtt    CKD (chronic kidney disease) stage 3, GFR 30-59 ml/min  - Admit sCr 1.8, baseline ~ 1.5-1.7  - Creatinine still 2.1 despite decreasing Lasix to 10 mg/hr yesterday - D/C'd Aldactone  - Repeating labs at noon  - Will monitor response    Cardiogenic shock  -See A/C CHF      Uninterrupted Critical Care/Counseling Time (not including procedures): 60 minutes      [place] [id number]  Heart Transplant  [facility] - Cardiac Intensive [facility]

## 2022-06-28 NOTE — PROGRESS NOTES
Pharmacokinetic Assessment Follow Up: IV Vancomycin    Vancomycin serum concentration assessment(s):    The random level was drawn correctly and can be used to guide therapy at this time. The measurement is above the desired definitive target range of 15 to 20 mcg/mL.    Vancomycin Regimen Plan:    Discontinue the scheduled vancomycin regimen and re-dose when the random level is less than 20 mcg/mL, next level to be drawn at 1700 on 6/28.    Drug levels (last 3 results):  Recent Labs   Lab Result Units 06/26/22  0820 06/27/22  2258   Vancomycin-Trough ug/mL 17.7 21.3       Pharmacy will continue to follow and monitor vancomycin.    Please contact pharmacy at extension [phone] for questions regarding this assessment.    Thank you for the consult,   Casey Woodall       Patient brief summary:  Kevan Queen is a 37 y.o. male initiated on antimicrobial therapy with IV Vancomycin for treatment of bacteremia    The patient's current regimen is suspended until vancomycin level returns to therapeutic range    Drug Allergies:   Review of patient's allergies indicates:   Allergen Reactions    Aspirin Other (See Comments)     Mr. Thacker is enrolled in Dr. Paige's Alon Trial and cannot have any aspirin and/or aspirin-containing products. DO NOT cancel any orders for INV Aspirin 100 mg/Placebo. If you have questions, please contact Isabel @ [phone], [phone], secure chat or MS Teams message.    Bumex [bumetanide] Hives    Lactose Diarrhea     Other reaction(s): Abdominal distension, gaseous    Torsemide Hives       Actual Body Weight:   93.6kg    Renal Function:   Estimated Creatinine Clearance: 63.6 mL/min (A) (based on SCr of 1.9 mg/dL (H)).,     Dialysis Method (if applicable):  N/A    CBC (last 72 hours):  Recent Labs   Lab Result Units 06/26/22  0303 06/27/22  0308   WBC K/uL 15.11* 14.14*   Hemoglobin g/dL 10.5* 10.3*   Hematocrit % 31.1* 30.5*   Platelets K/uL 449 451*   Gran % % 74.3*  73.0   Lymph % % 17.2* 19.4   Mono % % 6.8 5.7   Eosinophil % % 0.9 0.9   Basophil % % 0.4 0.5   Differential Method  Automated Automated       Metabolic Panel (last 72 hours):  Recent Labs   Lab Result Units 06/25/22  0615 06/25/22  1620 06/26/22  0303 06/26/22  1420 06/27/22  0308 06/27/22  1510   Sodium mmol/L 131* 135* 134* 133* 130* 133*   Potassium mmol/L  --  3.9 3.5 4.5 3.6 4.5   Chloride mmol/L  --  92* 91* 92* 89* 92*   CO2 mmol/L  --  27 27 28 27 21*   Glucose mg/dL  --  133* 197* 102 255* 142*   BUN mg/dL  --  35* 40* 43* 51* 55*   Creatinine mg/dL  --  1.8* 1.8* 1.9* 2.1* 1.9*   Albumin g/dL  --   --  3.4*  --  3.6  --    Total Bilirubin mg/dL  --   --  1.1*  --  0.9  --    Alkaline Phosphatase U/L  --   --  155*  --  151*  --    AST U/L  --   --  30  --  28  --    ALT U/L  --   --  29  --  27  --    Magnesium mg/dL  --   --  2.3  --  2.4  --        Vancomycin Administrations:  vancomycin given in the last 96 hours                   vancomycin in dextrose 5 % 1 gram/250 mL IVPB 1,000 mg (mg) 1,000 mg New Bag 06/27/22 2259      Restarted  1117     1,000 mg New Bag  1050     1,000 mg New Bag 06/26/22 2153     1,000 mg New Bag  0857     1,000 mg New Bag 06/25/22 2116    vancomycin in dextrose 5 % 1 gram/250 mL IVPB 1,000 mg (mg) 1,000 mg New Bag 06/25/22 0306     1,000 mg New Bag 06/24/22 1500                Microbiologic Results:  Microbiology Results (last 7 days)     Procedure Component Value Units Date/Time    Blood culture [[id number]]  (Abnormal)  (Susceptibility) Collected: 06/23/22 0108    Order Status: Completed Specimen: Blood from Peripheral, Hand, Right Updated: 06/27/22 1116     Blood Culture, Routine Gram stain dudley bottle: Gram positive cocci in clusters resembling Staph       Results called to and read back by: Mark Pickett RN  06/24/2022        14:36      STAPHYLOCOCCUS EPIDERMIDIS    Blood culture [[id number]] Collected: 06/25/22 0634    Order Status: Completed Specimen: Blood from  Peripheral, Wrist, Left Updated: 06/27/22 0812     Blood Culture, Routine No Growth to date      No Growth to date      No Growth to date    Blood culture [[id number]] Collected: 06/25/22 0633    Order Status: Completed Specimen: Blood from Peripheral, Antecubital, Right Updated: 06/27/22 0812     Blood Culture, Routine No Growth to date      No Growth to date      No Growth to date    Blood culture [[id number]] Collected: 06/23/22 0105    Order Status: Completed Specimen: Blood from Peripheral, Antecubital, Right Updated: 06/27/22 0612     Blood Culture, Routine No Growth to date      No Growth to date      No Growth to date      No Growth to date      No Growth to date    Blood culture [[id number]]  (Abnormal)  (Susceptibility) Collected: 06/21/22 2250    Order Status: Completed Specimen: Blood from Peripheral, Wrist, Left Updated: 06/26/22 1059     Blood Culture, Routine Gram stain dudley bottle: Gram positive cocci in clusters resembling Staph      Results called to and read back by: Rosa Pardo RN. 06/22/2022  23:29      Gram stain aer bottle: Gram positive cocci in clusters resembling Staph       Positive results previously called 06/23/2022  04:33      STAPHYLOCOCCUS EPIDERMIDIS    Blood culture [[id number]]  (Abnormal)  (Susceptibility) Collected: 06/21/22 2250    Order Status: Completed Specimen: Blood from Peripheral, Wrist, Left Updated: 06/25/22 1029     Blood Culture, Routine Gram stain dudley bottle: Gram positive cocci      Results called to and read back by: Arnulfo Herrera RN. 06/22/2022  17:46      Gram stain aer bottle: Gram positive cocci in clusters resembling Staph       Positive results previously called 06/23/2022  00:11      STAPHYLOCOCCUS EPIDERMIDIS    Blood culture [[id number]]  (Abnormal)  (Susceptibility) Collected: 06/20/22 1651    Order Status: Completed Specimen: Blood from Peripheral, Hand, Left Updated: 06/25/22 1023     Blood Culture, Routine Gram stain dudley bottle: Gram positive cocci in  clusters resembling Staph      Results called to and read back by:Tara López RN 06/21/2022  22:16      STAPHYLOCOCCUS EPIDERMIDIS    Blood culture [[id number]]     Order Status: Canceled Specimen: Blood

## 2022-06-28 NOTE — ASSESSMENT & PLAN NOTE
- Admit sCr 1.8, baseline ~ 1.5-1.7  - Creatinine still 2.1 despite decreasing Lasix to 10 mg/hr yesterday - D/C'd Aldactone  - Repeating labs at noon  - Will monitor response

## 2022-06-28 NOTE — ASSESSMENT & PLAN NOTE
Endocrinology consulted for BG management.   BG goal 140-180    - Transition drip at 1.5 units/hr with step-down parameters   - Novolog 8-12 units TIDWM (basal/prandial matching)  - Novolog (aspart) insulin prn for BG excursions [facility] (150/25).   - BG checks q4hr  - Hypoglycemia protocol in place  - If blood glucose greater than 300, please ask patient not to eat food or drink anything other than water until correctional insulin has brought it back below 250    - Will likely require IIP s/p LVAD    - Provided bedside diabetes education.    ** Please notify Endocrine for any change and/or advance in diet**  ** Please call Endocrine for any BG related issues **    Discharge Planning:   TBD. Please notify endocrinology prior to discharge.

## 2022-06-28 NOTE — PROGRESS NOTES
Update    Pt presents in room with SO Page who is sleeping on couch. Pt aaox4 with pleasant affect. Pt voices understanding of plan of care. Pt reports he is ready to be up and moving again post surgery. Pt to receive LVAD implant tomorrow. Provided check-in information for pt to give to SO for BH reservation tomorrow. Pt reports coping well and denies further needs, questions, concerns at this time and none indicated. Providing ongoing psychosocial and counseling support, education, resources, assistance and discharge planning as indicated. Following and available.

## 2022-06-28 NOTE — NURSING
"2040: Notified Dr. Sanderson of patients SVO2 31%, 30%, 38%. Patient in no distress. Denies SOB, chest pain, states "he feels normal". VSS     2050: Dr. Sanderson to bedside to assess patient. No new orders at this time.    "

## 2022-06-28 NOTE — ANESTHESIA PREPROCEDURE EVALUATION
[facility]  Anesthesia Pre-Operative Evaluation         Patient Name/: Kevan Queen, 1985  MRN: [id number]    SUBJECTIVE:     Pre-operative evaluation for Procedure(s) (LRB):  INSERTION-LEFT VENTRICULAR ASSIST DEVICE (Left)     2022    Paper consent signed and witnessed in chart.     Kevan Queen is a 37 y.o. male w/ a significant PMHx of former polysubstance abuse, CKD 3, uncontrolled T2DM (recently admitted for [facility]), anxiety, NICM with BiV systolic heart failure, on home Milrinone at 0.375 mcg/kg/min who presented with ADHF and required IABP support.    Epi 0.04, milrinone 0.375, Lasix gtt 10 mg/hr, Marcella 10 PPM, heparin with IABP 1:1. Insulin gtt. 4L NC.    Patient now presents for the above procedure(s).    ECHO:  Results for orders placed during the hospital encounter of 22    Echo    Interpretation Summary  · The left ventricle is severely enlarged with eccentric hypertrophy and severely decreased systolic function.  · The estimated ejection fraction is 10%.  · There is severe left ventricular global hypokinesis.  · Grade III left ventricular diastolic dysfunction.  · Severe left atrial enlargement.  · Moderate right ventricular enlargement with mildly to moderately reduced right ventricular systolic function.  · Moderate right atrial enlargement.  · Moderate-to-severe mitral regurgitation.  · Moderate to severe tricuspid regurgitation.  · Elevated central venous pressure (15 mmHg).  · The estimated PA systolic pressure is 56 mmHg.  · There is moderate pulmonary hypertension.    Prev airway: None documented.    LDA:   Percutaneous Central Line Insertion/Assessment - Triple Lumen  22 1685 right internal jugular (Active)   Line Necessity Review Frequent Blood Draws;Hemodynamic instability;Incompatible infusions;Longterm central access required;Medication caustic to vasculature;Poor venous access 22 1100   $ Central Line Charges (Upon insertion) Bedside Insertion  Performed Pt > 5 Years Old;Catheter - Triple Lumen (Supply) 06/14/22 0000   Verification by X-ray Yes 06/20/22 1100   Site Assessment No drainage;No redness;No swelling;No warmth 06/20/22 1100   Line Securement Device Secured with sutures 06/20/22 1100   Dressing Type Biopatch in place;Central line dressing;Transparent (Tegaderm) 06/20/22 1100   Dressing Status Clean;Dry;Intact 06/20/22 1100   Dressing Intervention Integrity maintained 06/20/22 1100   Date on Dressing 06/18/22 06/20/22 1100   Dressing Due to be Changed 06/25/22 06/20/22 1100   Distal Patency/Care flushed w/o difficulty;blood return present 06/20/22 0400   Medial 1 Patency/Care infusing 06/20/22 0400   Proximal 1 Patency/Care infusing 06/20/22 0400   Waveform Normal 06/20/22 1100   Number of days: 6       Percutaneous Central Line Insertion/Assessment - Triple Lumen  06/20/22 1458 left internal jugular (Active)   Number of days: 0            IABP 06/13/22 2229 8.0 Fr. 40 mL (Active)   Site Assessment Clean;Dry;Intact;No redness;No swelling;No warmth;No drainage 06/20/22 1400   Helium Tubing Clear 06/20/22 1400   Arterial Line Status Arterial fluids per protocol;Intact and in place 06/20/22 1400   Arterial Line Interventions Zeroed and calibrated;Leveled;Connections checked and tightened;Flushed per protocol 06/20/22 1400   Positioning HOB up 15 degrees 06/20/22 1400   Dressing Occlusive 06/20/22 1400   Dressing Status Clean;Dry;Intact 06/20/22 1400   Dressing Intervention Integrity maintained 06/20/22 1400   Dressing Change Due 06/20/22 06/20/22 1400   Color/Movement/Sensation Capillary refill less than 3 sec 06/20/22 1400   Number of days: 6       Male External Urinary Catheter 06/14/22 0000 (Active)   Collection Container Urimeter 06/20/22 1100   Securement Method secured to top of thigh w/ adhesive device 06/20/22 1100   Skin no redness;no breakdown 06/20/22 1100   Tolerance no signs/symptoms of discomfort 06/20/22 1100   Output (mL) 310 mL  06/20/22 1400   Catheter Change Date 06/20/22 06/20/22 1100   Catheter Change Time 0400 06/20/22 1100   Number of days: 6       Drips:       Patient Active Problem List   Diagnosis    Acute on chronic combined systolic and diastolic heart failure, NYHA class 4    Anxiety disorder, unspecified    NICM (nonischemic cardiomyopathy)    Anemia of chronic disease    Ecstasy abuse    Cannabis use disorder, severe, dependence    Congestive heart failure    Chronic combined systolic and diastolic congestive heart failure    Tobacco use    Cardiogenic shock    CKD (chronic kidney disease) stage 3, GFR 30-59 ml/min    Uncontrolled diabetes mellitus    Insomnia    Left shoulder pain    SOB (shortness of breath)    Tachycardia    Muscle cramping    Palliative care encounter    Goals of care, counseling/discussion    Advanced care planning/counseling discussion    Staphylococcus epidermidis bacteremia    Dilated cardiomyopathy       Review of patient's allergies indicates:   Allergen Reactions    Aspirin Other (See Comments)     Mr. Thacker is enrolled in Dr. Paige's Alon Trial and cannot have any aspirin and/or aspirin-containing products. DO NOT cancel any orders for INV Aspirin 100 mg/Placebo. If you have questions, please contact Isabel @ [phone], [phone],[email or web address], secure chat or MS Teams message.    Bumex [bumetanide] Hives    Lactose Diarrhea     Other reaction(s): Abdominal distension, gaseous    Torsemide Hives       Current Inpatient Medications:       Current Facility-Administered Medications on File Prior to Visit   Medication Dose Route Frequency Provider Last Rate Last Admin    0.9%  NaCl infusion (for blood administration)   Intravenous Q24H PRN Rajani Jackson PA-C        0.9%  NaCl infusion (for blood administration)   Intravenous Q24H PRN Rajani Jackson PA-C        0.9%  NaCl infusion (for blood administration)   Intravenous Q24H PRN Rajani ASENCIO  BHASKAR Jackson        0.9%  NaCl infusion   Intravenous Continuous Natalya Villatoro MD 5 mL/hr at 06/27/22 0055 New Bag at 06/27/22 0055    acetaminophen tablet 650 mg  650 mg Oral Q8H Josh Pulido MD   650 mg at 06/27/22 2202    ALPRAZolam tablet 0.25 mg  0.25 mg Oral BID PRN Luca Lopez Jr., MD   0.25 mg at 06/27/22 2202    aspirin chewable tablet 81 mg  81 mg Oral Daily Alana Cain NP   81 mg at 06/27/22 0949    bisacodyL suppository 10 mg  10 mg Rectal Daily PRN DEYA Martinez   10 mg at 06/27/22 1517    busPIRone split tablet 7.5 mg  7.5 mg Oral Q12H Alana Cain NP   7.5 mg at 06/27/22 2052    dextrose 10% bolus 125 mL  12.5 g Intravenous PRN Michael Wyman DNP, FNP        dextrose 10% bolus 250 mL  25 g Intravenous PRN Michael Wyman DNP, FNP        diphenhydrAMINE capsule 50 mg  50 mg Oral On Call Procedure Anita Ovalles MD        EPINEPHrine (ADRENALIN) 5 mg in dextrose 5 % 250 mL infusion  0.04 mcg/kg/min Intravenous Continuous Minor Silva MD 12.3 mL/hr at 06/28/22 0007 0.04 mcg/kg/min at 06/28/22 0007    furosemide (LASIX) 500 mg infusion (conc: 10 mg/mL)  10 mg/hr Intravenous Continuous Denise Espinoza PA-C 1 mL/hr at 06/27/22 1800 10 mg/hr at 06/27/22 1800    gabapentin capsule 300 mg  300 mg Oral TID Alana Cain NP   300 mg at 06/27/22 2053    glucagon (human recombinant) injection 1 mg  1 mg Intramuscular PRN Michael Wyman DNP, FNP        glucose chewable tablet 16 g  16 g Oral PRN Michael Wyman DNP, FNP        glucose chewable tablet 24 g  24 g Oral PRN Michael Wyman DNP, FNP        heparin 25,000 units in dextrose 5% 250 ml (100 units/mL) infusion MINIMAL INTENSITY nomogram - [facility]  0-40 Units/kg/hr (Adjusted) Intravenous Continuous Rebel Sanderson MD 20.2 mL/hr at 06/28/22 0331 22 Units/kg/hr at 06/28/22 0331    HYDROcodone-acetaminophen 5-325 mg per tablet 1 tablet  1 tablet Oral Q8H Alana Cain NP   1 tablet  at 06/27/22 2202    insulin aspart U-100 pen 0-10 Units  0-10 Units Subcutaneous PRN Michael Wyman DNP, FNP   4 Units at 06/28/22 0506    insulin aspart U-100 pen 8-12 Units  8-12 Units Subcutaneous TIDWM Michael Wyman DNP, FNP   12 Units at 06/27/22 1626    insulin regular in 0.9 % NaCl 100 unit/100 mL (1 unit/mL) infusion  1.5 Units/hr Intravenous Continuous Michael Wyman DNP, FNP 1.5 mL/hr at 06/27/22 2308 1.5 Units/hr at 06/27/22 2308    LIDOcaine HCL 20 mg/ml (2%) injection 20 mL  20 mL Other Once Luca Lopez Jr., MD        magnesium oxide tablet 800 mg  800 mg Oral PRN Rebel Sanderson MD   800 mg at 06/22/22 0609    magnesium oxide tablet 800 mg  800 mg Oral PRN Rebel Sanderson MD        methocarbamoL tablet 500 mg  500 mg Oral QID Josh Pulido MD   500 mg at 06/27/22 2052    milrinone 20mg in D5W 100 mL infusion  0.375 mcg/kg/min (Order-Specific) Intravenous Continuous Alana Cain NP 10.5 mL/hr at 06/28/22 0508 0.375 mcg/kg/min at 06/28/22 0508    mirtazapine tablet 15 mg  15 mg Oral Nightly Alana Cain NP   15 mg at 06/27/22 2053    mupirocin 2 % ointment   Nasal On Call Procedure Rajani Jackson PA-C        nitric oxide gas Gas 20 ppm  20 ppm Inhalation Continuous Alana Cain NP   20 ppm at 06/28/22 0756    pantoprazole EC tablet 40 mg  40 mg Oral Daily Alana Cain NP   40 mg at 06/27/22 0949    polyethylene glycol packet 17 g  17 g Oral BID DEYA Martinez   17 g at 06/27/22 0948    potassium bicarbonate disintegrating tablet 35 mEq  35 mEq Oral PRN Rebel Sanderson MD        potassium bicarbonate disintegrating tablet 50 mEq  50 mEq Oral PRN Rebel Sanderson MD   50 mEq at 06/23/22 2157    potassium bicarbonate disintegrating tablet 60 mEq  60 mEq Oral PRN Rebel Sanderson MD        potassium chloride CR capsule 30 mEq  30 mEq Oral BID Josh Pulido MD   30 mEq at 06/27/22 2052    potassium, sodium phosphates 280-160-250 mg  packet 2 packet  2 packet Oral PRN Rebel Sanderson MD        potassium, sodium phosphates 280-160-250 mg packet 2 packet  2 packet Oral PRN Rebel Sanderson MD        potassium, sodium phosphates 280-160-250 mg packet 2 packet  2 packet Oral PRN Rebel Sanderson MD        promethazine tablet 12.5 mg  12.5 mg Oral Q6H PRN Alana Pinedaill, NP   12.5 mg at 06/13/22 1941    senna-docusate 8.6-50 mg per tablet 1 tablet  1 tablet Oral Daily Rebel Sanderson MD   1 tablet at 06/27/22 0949    sodium chloride 0.9% flush 10 mL  10 mL Intravenous PRN Alana Pinedaill, NP        sodium chloride 0.9% flush 10 mL  10 mL Intravenous PRN Anita Ovalles MD        sucralfate tablet 1 g  1 g Oral Nightly Alana Payton, NP   1 g at 06/27/22 2053    vancomycin - pharmacy to dose   Intravenous pharmacy to manage frequency DEYA Martinez         Current Outpatient Medications on File Prior to Visit   Medication Sig Dispense Refill    albuterol (PROVENTIL/VENTOLIN HFA) 90 mcg/actuation inhaler INHALE 2 PUFFS BY MOUTH EVERY 4 HOURS AS NEEDED FOR COUGH OR WHEEZING      blood sugar diagnostic Strp Use to test blood glucose 4 (four) times daily. 200 each 3    blood-glucose meter Misc Use as instructed 1 each 0    busPIRone (BUSPAR) 7.5 MG tablet Take 7.5 mg by mouth every 12 (twelve) hours.      EScitalopram oxalate (LEXAPRO) 5 MG Tab Take 1 tablet (5 mg total) by mouth once daily. 90 tablet 3    furosemide (LASIX) 80 MG tablet Take 80 mg by mouth 2 (two) times daily.      insulin aspart U-100 (NOVOLOG) 100 unit/mL (3 mL) InPn pen Inject 12 Units into the skin 3 (three) times daily. 30 mL 3    insulin detemir U-100 (LEVEMIR FLEXTOUCH) 100 unit/mL (3 mL) SubQ InPn pen Inject 23 Units into the skin once daily. 18 mL 3    lancets 33 gauge Misc Use to test blood glucose 4 (four) times daily. 200 each 3    milrinone (PRIMACOR) 1 mg/mL injection Inject into the vein.      milrinone 20mg/100ml D5W, 200mcg/ml, (PRIMACOR) 20 mg/100  "mL (200 mcg/mL) infusion Inject 34.875 mcg/min into the vein continuous.      mirtazapine (REMERON) 15 MG tablet Take 15 mg by mouth nightly.      pantoprazole (PROTONIX) 40 MG tablet Take 1 tablet (40 mg total) by mouth once daily. 30 tablet 11    pen needle, diabetic 31 gauge x 1/4" Ndle 1 Device by Misc.(Non-Drug; Combo Route) route 4 (four) times daily. 180 each 3    potassium chloride SA (K-DUR,KLOR-CON) 20 MEQ tablet Take 2 tablets (40 mEq total) by mouth once daily. 60 tablet 11    promethazine (PHENERGAN) 12.5 MG Tab Take 1 tablet (12.5 mg total) by mouth every 6 (six) hours as needed (nausea). 30 tablet 3    sucralfate (CARAFATE) 1 gram tablet Take 1 g by mouth nightly.      traMADoL (ULTRAM) 50 mg tablet Take 1 tablet (50 mg total) by mouth every 6 (six) hours as needed for Pain. 28 tablet 0    [DISCONTINUED] digoxin (LANOXIN) 125 mcg tablet Take 1 tablet (0.125 mg total) by mouth once daily. 30 tablet 11    [DISCONTINUED] insulin (LANTUS SOLOSTAR U-100 INSULIN) glargine 100 units/mL (3mL) SubQ pen Inject 10 Units into the skin every evening. (Patient not taking: Reported on 5/24/2022) 3 mL 11    [DISCONTINUED] metOLazone (ZAROXOLYN) 2.5 MG tablet Take 2.5 mg by mouth every other day.      [DISCONTINUED] metoprolol succinate (TOPROL-XL) 25 MG 24 hr tablet TAKE 1 TABLET(25 MG) BY MOUTH EVERY DAY 30 tablet 0    [DISCONTINUED] spironolactone (ALDACTONE) 25 MG tablet Take 1 tablet (25 mg total) by mouth once daily. 30 tablet 11       Past Surgical History:   Procedure Laterality Date    CARDIAC DEFIBRILLATOR PLACEMENT      LEFT VENTRICULAR ASSIST DEVICE Left 6/23/2022    Procedure: INSERTION-LEFT VENTRICULAR ASSIST DEVICE;  Surgeon: Luis F Paige MD;  Location: [facility] OR [address] [place] [address];  Service: Cardiovascular;  Laterality: Left;    RIGHT HEART CATHETERIZATION Right 4/8/2022    Procedure: INSERTION, CATHETER, RIGHT HEART;  Surgeon: Luca Lopez Jr., MD;  Location: [facility] CATH LAB;  Service: " Cardiology;  Laterality: Right;    RIGHT HEART CATHETERIZATION Right 4/19/2022    Procedure: INSERTION, CATHETER, RIGHT HEART;  Surgeon: Josh Pulido MD;  Location: [facility] CATH LAB;  Service: Cardiology;  Laterality: Right;       Social History:  Tobacco Use: Medium Risk    Smoking Tobacco Use: Former Smoker    Smokeless Tobacco Use: Never Used       Alcohol Use: Not on file       OBJECTIVE:     Vital Signs Range:  BMI Readings from Last 1 Encounters:   06/27/22 25.79 kg/m²       Temp:  [36.6 °C (97.8 °F)]   Pulse:  [105-116]   Resp:  [14-26]   BP: (117-151)/(64-90)   SpO2:  [96 %-100 %]        Significant Labs:        Component Value Date/Time    WBC 13.76 (H) 06/28/2022 0329    HGB 9.9 (L) 06/28/2022 0329    HCT 29.6 (L) 06/28/2022 0329    HCT 37 06/15/2022 2018     06/28/2022 0329     (L) 06/28/2022 0329    K 4.1 06/28/2022 0329    CL 94 (L) 06/28/2022 0329    CO2 20 (L) 06/28/2022 0329     (H) 06/28/2022 0329    BUN 57 (H) 06/28/2022 0329    CREATININE 2.1 (H) 06/28/2022 0329    MG 2.5 06/28/2022 0329    PHOS 5.6 (H) 06/17/2022 0332    CALCIUM 10.1 06/28/2022 0329    ALBUMIN 3.5 06/28/2022 0329    PROT 8.2 06/28/2022 0329    ALKPHOS 153 (H) 06/28/2022 0329    BILITOT 0.8 06/28/2022 0329    AST 35 06/28/2022 0329    ALT 27 06/28/2022 0329    INR 1.0 06/20/2022 1607    HGBA1C 9.2 (H) 05/20/2022 1203    HGBA1C 6.2 (H) 04/18/2022 1407        Please see Results Review for additional labs.     Diagnostic Studies: No relevant studies.    EKG:   Results for orders placed or performed during the hospital encounter of 06/13/22   EKG 12-lead    Collection Time: 06/21/22 11:50 AM    Narrative    Test Reason : I50.9,    Vent. Rate : 117 BPM     Atrial Rate : 117 BPM     P-R Int : 126 ms          QRS Dur : 086 ms      QT Int : 342 ms       P-R-T Axes : 061 -48 029 degrees     QTc Int : 477 ms    Sinus tachycardia  Biatrial enlargement  Left axis deviation  Nonspecific ST and T wave  abnormality  Abnormal ECG  When compared with ECG of 16-JUN-2022 03:35,  The axis Shifted left  ST now depressed in Inferior leads  Nonspecific T wave abnormality no longer evident in Inferior leads  T wave inversion now evident in Lateral leads  Confirmed by TRAVIS HILL MD ([id number]) on 6/21/2022 1:40:14 PM    Referred By: DAVE   SELF           Confirmed By:TRAVIS HILL MD         ASSESSMENT/PLAN:         Pre-op Assessment    I have reviewed the Patient Summary Reports.     I have reviewed the Nursing Notes.    I have reviewed the Medications.     Review of Systems  Anesthesia Hx:   Denies Personal Hx of Anesthesia complications.   Social:  Former Smoker    Cardiovascular:   Pacemaker CHF    Pulmonary:   Shortness of breath    Renal/:   Chronic Renal Disease, CRI    Endocrine:   Diabetes, poorly controlled, type 2    Psych:   Psychiatric History          Physical Exam  General: Well nourished, Cooperative and Alert    Airway:  Mouth Opening: Normal  TM Distance: Normal  Tongue: Normal  Neck ROM: Normal ROM    Dental:  Intact    Chest/Lungs:  Normal Respiratory Rate    Heart:  Rate: Normal  Rhythm: Regular Rhythm        Anesthesia Plan  Type of Anesthesia, risks & benefits discussed:    Anesthesia Type: Gen ETT  Intra-op Monitoring Plan: Standard ASA Monitors, Art Line, Central Line, GUILLERMO, PA and CO  Post Op Pain Control Plan: multimodal analgesia and IV/PO Opioids PRN  Induction:  IV  Airway Plan: Direct, Post-Induction  Informed Consent: Informed consent signed with the Patient and all parties understand the risks and agree with anesthesia plan.  All questions answered.   ASA Score: 4  Day of Surgery Review of History & Physical: H&P Update referred to the surgeon/provider.    Ready For Surgery From Anesthesia Perspective.     .

## 2022-06-28 NOTE — SUBJECTIVE & OBJECTIVE
"Interval HPI:   Overnight events: Remains in [id number] ADALID VILLALOBOS. BG at or above goal with IV insulin infusion and scheduled novolog. 5 Days Post-Op.   Eating:   Diet diabetic [facility]; 2000 Calorie; Cardiac (Low Na/Chol); Isolation Tray - Regular [place]; Fluid - 1500mL  50%  Nausea: No  Hypoglycemia and intervention: No  Fever: No  TPN and/or TF: No    /63   Pulse 104   Temp 98.1 °F (36.7 °C) (Oral)   Resp 20   Ht 6' 3" (1.905 m)   Wt 93.4 kg (206 lb)   SpO2 100%   BMI 25.75 kg/m²     Labs Reviewed and Include    Recent Labs   Lab 06/28/22  0329   *   CALCIUM 10.1   ALBUMIN 3.5   PROT 8.2   *   K 4.1   CO2 20*   CL 94*   BUN 57*   CREATININE 2.1*   ALKPHOS 153*   ALT 27   AST 35   BILITOT 0.8     Lab Results   Component Value Date    WBC 13.76 (H) 06/28/2022    HGB 9.9 (L) 06/28/2022    HCT 29.6 (L) 06/28/2022    MCV 89 06/28/2022     06/28/2022     No results for input(s): TSH, FREET4 in the last 168 hours.  Lab Results   Component Value Date    HGBA1C 9.2 (H) 05/20/2022       Nutritional status:   Body mass index is 25.75 kg/m².  Lab Results   Component Value Date    ALBUMIN 3.5 06/28/2022    ALBUMIN 3.6 06/27/2022    ALBUMIN 3.4 (L) 06/26/2022     Lab Results   Component Value Date    PREALBUMIN 36 04/18/2022       Estimated Creatinine Clearance: 57.6 mL/min (A) (based on SCr of 2.1 mg/dL (H)).    Accu-Checks  Recent Labs     06/27/22  0105 06/27/22  0423 06/27/22  0827 06/27/22  1119 06/27/22  1624 06/27/22 2016 06/27/22  2024 06/28/22  0502 06/28/22  0934 06/28/22  1204   POCTGLUCOSE 222* 286* 179* 189* 168* 207* 195* 215* 245* 175*       Current Medications and/or Treatments Impacting Glycemic Control  Immunotherapy:    Immunosuppressants       None          Steroids:   Hormones (From admission, onward)                None          Pressors:    Autonomic Drugs (From admission, onward)                Start     Stop Route Frequency Ordered    06/13/22 2130  " EPINEPHrine (ADRENALIN) 5 mg in dextrose 5 % 250 mL infusion         -- IV Continuous 06/13/22 2024          Hyperglycemia/Diabetes Medications:   Antihyperglycemics (From admission, onward)                Start     Stop Route Frequency Ordered    06/27/22 1230  insulin regular in 0.9 % NaCl 100 unit/100 mL (1 unit/mL) infusion        Question:  Enter initial dose (Units/hr):  Answer:  1.5    -- IV Continuous 06/27/22 1227    06/26/22 1645  insulin aspart U-100 pen 8-12 Units         -- SubQ 3 times daily with meals 06/26/22 1154    06/25/22 1127  insulin aspart U-100 pen 0-10 Units         -- SubQ As needed (PRN) 06/25/22 1028

## 2022-06-28 NOTE — PROGRESS NOTES
06/28/2022  Casey Arizmendi    Current provider:  Angela Yang DO    Device interrogation:  No flowsheet data found.       Rounded on Kevan Queen to ensure all mechanical assist device settings (IABP or VAD) were appropriate and all parameters were within limits.  I was able to ensure all back up equipment was present, the staff had no issues, and the Perfusion Department daily rounding was complete.      For implantable VADs: Interrogation of Ventricular assist device was performed with analysis of device parameters and review of device function. I have personally reviewed the interrogation findings and agree with findings as stated.     In emergency, the nursing units have been notified to contact the perfusion department either by:  Calling [phone] from 630am to 4pm Mon thru Fri, utilizing the On-Call Finder functionality of Epic and searching for Perfusion, or by contacting the hospital  from 4pm to 630am and on weekends and asking to speak with the perfusionist on call.    7:01 AM

## 2022-06-28 NOTE — PROGRESS NOTES
"[facility] - Cardiac Intensive Care  Endocrinology  Progress Note    Admit Date: 6/13/2022     Reason for Consult: Management of  type 2 DM, Hyperglycemia     Surgical Procedure and Date: none    Diabetes diagnosis year: 4/21/21    Home Diabetes Medications:  Detemir  23  units daily and Aspart   12  units TIDWM and  Mod dose correction insulin    Lab Results   Component Value Date    HGBA1C 9.2 (H) 05/20/2022           How often checking glucose at home? 1-3 x day   BG readings on regimen: 180- up to 300 once  Hypoglycemia on the regimen?  yes once- related to not really eating after taking aspart   Missed doses on regimen?  no    Diabetes Complications include:     Hyperglycemia    Complicating diabetes co morbidities:   History of MI, CHF, and CKD      HPI:   Patient is a 37 y.o. male with a diagnosis of type 2 DM and NIDCM with BiV systolic heart failure. Patient was presented at [facility] 4/2/22  and was  approved for VAD. Also recently admitted with [facility]; he had clinic appointment last week to f/u recent admit for hyperglycemic hyperosmolar syndrome but did not come as he was "feeling too bad" presents to  ED with SOB. Endocrinology consulted for BG/ DM management.                Interval HPI:   Overnight events: Remains in [id number] [facility]. BG at or above goal with IV insulin infusion and scheduled novolog. 5 Days Post-Op.   Eating:   Diet diabetic [facility]; 2000 Calorie; Cardiac (Low Na/Chol); Isolation Tray - Regular [place]; Fluid - 1500mL  50%  Nausea: No  Hypoglycemia and intervention: No  Fever: No  TPN and/or TF: No    /63   Pulse 104   Temp 98.1 °F (36.7 °C) (Oral)   Resp 20   Ht 6' 3" (1.905 m)   Wt 93.4 kg (206 lb)   SpO2 100%   BMI 25.75 kg/m²     Labs Reviewed and Include    Recent Labs   Lab 06/28/22  0329   *   CALCIUM 10.1   ALBUMIN 3.5   PROT 8.2   *   K 4.1   CO2 20*   CL 94*   BUN 57*   CREATININE 2.1*   ALKPHOS 153*   ALT 27   AST 35   BILITOT 0.8 "     Lab Results   Component Value Date    WBC 13.76 (H) 06/28/2022    HGB 9.9 (L) 06/28/2022    HCT 29.6 (L) 06/28/2022    MCV 89 06/28/2022     06/28/2022     No results for input(s): TSH, FREET4 in the last 168 hours.  Lab Results   Component Value Date    HGBA1C 9.2 (H) 05/20/2022       Nutritional status:   Body mass index is 25.75 kg/m².  Lab Results   Component Value Date    ALBUMIN 3.5 06/28/2022    ALBUMIN 3.6 06/27/2022    ALBUMIN 3.4 (L) 06/26/2022     Lab Results   Component Value Date    PREALBUMIN 36 04/18/2022       Estimated Creatinine Clearance: 57.6 mL/min (A) (based on SCr of 2.1 mg/dL (H)).    Accu-Checks  Recent Labs     06/27/22  0105 06/27/22  0423 06/27/22  0827 06/27/22  1119 06/27/22  1624 06/27/22  2016 06/27/22  2024 06/28/22  0502 06/28/22  0934 06/28/22  1204   POCTGLUCOSE 222* 286* 179* 189* 168* 207* 195* 215* 245* 175*       Current Medications and/or Treatments Impacting Glycemic Control  Immunotherapy:    Immunosuppressants       None          Steroids:   Hormones (From admission, onward)                None          Pressors:    Autonomic Drugs (From admission, onward)                Start     Stop Route Frequency Ordered    06/13/22 2130  EPINEPHrine (ADRENALIN) 5 mg in dextrose 5 % 250 mL infusion         -- IV Continuous 06/13/22 2024          Hyperglycemia/Diabetes Medications:   Antihyperglycemics (From admission, onward)                Start     Stop Route Frequency Ordered    06/27/22 1230  insulin regular in 0.9 % NaCl 100 unit/100 mL (1 unit/mL) infusion        Question:  Enter initial dose (Units/hr):  Answer:  1.5    -- IV Continuous 06/27/22 1227    06/26/22 1645  insulin aspart U-100 pen 8-12 Units         -- SubQ 3 times daily with meals 06/26/22 1154    06/25/22 1127  insulin aspart U-100 pen 0-10 Units         -- SubQ As needed (PRN) 06/25/22 1024            ASSESSMENT and PLAN    * Acute on chronic combined systolic and diastolic heart failure, NYHA class  4  Optimize glucose control and  avoid hypoglycemia    Managed per primary.       Uncontrolled diabetes mellitus  Endocrinology consulted for BG management.   BG goal 140-180    - Transition drip at 1.5 units/hr with step-down parameters   - Novolog 8-12 units TIDWM (basal/prandial matching)  - Novolog (aspart) insulin prn for BG excursions [facility] (150/25).   - BG checks q4hr  - Hypoglycemia protocol in place  - If blood glucose greater than 300, please ask patient not to eat food or drink anything other than water until correctional insulin has brought it back below 250    - Will likely require IIP s/p LVAD    - Provided bedside diabetes education.    ** Please notify Endocrine for any change and/or advance in diet**  ** Please call Endocrine for any BG related issues **    Discharge Planning:   TBD. Please notify endocrinology prior to discharge.        CKD (chronic kidney disease) stage 3, GFR 30-59 ml/min    Titrate insulin slowly to avoid hypoglycemia as the risk of hypoglycemia increases with decreased creatinine clearance.    Estimated Creatinine Clearance: 57.6 mL/min (A) (based on SCr of 2.1 mg/dL (H)).            Chris Gomes, NP  Endocrinology  [facility] - Cardiac Intensive [facility]

## 2022-06-28 NOTE — PLAN OF CARE
Milrinone 0.375 mcg/kg/min changed to  5 mcg/kg/min per Dr. Paige. Will move repeat labs and HD to 3 PM

## 2022-06-28 NOTE — CARE UPDATE
Transplant Update Note    Hemodynamics    CVP 9  SvO2 30, 31, 38  CO 4.24 (5 earlier today)  CI 2.01 (2.3 earlier today)  SVR 1416   cc / 2.5 hrs  ABG obtained for elevated CO2 on VBG in comparison to previous trends.     pH 7.40, pCO2 42.7, pO2 110, HCO3 26.5 , SaO2 98%, POC lactate 1.09    Continue current management for now as urine output appropriate and repeat hemodynamics in the AM. If low SvO2 persists, could consider increasing epi to 0.06 mcg/kg/min keeping in mind risk of further ectopy with increased inotropic support.     Please call with questions or concerns.     Rebel Sanderson MD  Heart Transplant PGY4  [facility]

## 2022-06-29 ENCOUNTER — TELEPHONE (OUTPATIENT)
Dept: TRANSPLANT | Facility: CLINIC | Age: 37
End: 2022-06-29
Payer: MEDICAID

## 2022-06-29 ENCOUNTER — ANESTHESIA (OUTPATIENT)
Dept: SURGERY | Facility: HOSPITAL | Age: 37
DRG: 001 | End: 2022-06-29
Payer: MEDICAID

## 2022-06-29 ENCOUNTER — DOCUMENTATION ONLY (OUTPATIENT)
Dept: CARDIOLOGY | Facility: HOSPITAL | Age: 37
End: 2022-06-29
Payer: MEDICAID

## 2022-06-29 ENCOUNTER — ANESTHESIA EVENT (OUTPATIENT)
Dept: SURGERY | Facility: HOSPITAL | Age: 37
DRG: 001 | End: 2022-06-29
Payer: MEDICAID

## 2022-06-29 PROBLEM — T14.8XXA SURGICAL WOUND PRESENT: Status: ACTIVE | Noted: 2022-06-29

## 2022-06-29 LAB
ALBUMIN SERPL BCP-MCNC: 3.5 G/DL (ref 3.5–5.2)
ALLENS TEST: ABNORMAL
ALP SERPL-CCNC: 154 U/L (ref 55–135)
ALT SERPL W/O P-5'-P-CCNC: 27 U/L (ref 10–44)
ANION GAP SERPL CALC-SCNC: 11 MMOL/L (ref 8–16)
ANION GAP SERPL CALC-SCNC: 12 MMOL/L (ref 8–16)
ANION GAP SERPL CALC-SCNC: 15 MMOL/L (ref 8–16)
ANION GAP SERPL CALC-SCNC: 15 MMOL/L (ref 8–16)
ANISOCYTOSIS BLD QL SMEAR: SLIGHT
APTT BLDCRRT: 23.6 SEC (ref 21–32)
APTT BLDCRRT: 37.3 SEC (ref 21–32)
APTT BLDCRRT: 49.8 SEC (ref 21–32)
APTT BLDCRRT: 52.5 SEC (ref 21–32)
APTT BLDCRRT: >150 SEC (ref 21–32)
AST SERPL-CCNC: 32 U/L (ref 10–40)
BASOPHILS # BLD AUTO: 0.04 K/UL (ref 0–0.2)
BASOPHILS # BLD AUTO: 0.05 K/UL (ref 0–0.2)
BASOPHILS # BLD AUTO: 0.06 K/UL (ref 0–0.2)
BASOPHILS # BLD AUTO: 0.07 K/UL (ref 0–0.2)
BASOPHILS NFR BLD: 0.2 % (ref 0–1.9)
BASOPHILS NFR BLD: 0.4 % (ref 0–1.9)
BILIRUB SERPL-MCNC: 0.8 MG/DL (ref 0.1–1)
BLD PROD TYP BPU: NORMAL
BLOOD UNIT EXPIRATION DATE: NORMAL
BLOOD UNIT TYPE CODE: 6200
BLOOD UNIT TYPE: NORMAL
BUN SERPL-MCNC: 47 MG/DL (ref 6–20)
BUN SERPL-MCNC: 48 MG/DL (ref 6–20)
BUN SERPL-MCNC: 49 MG/DL (ref 6–20)
BUN SERPL-MCNC: 51 MG/DL (ref 6–20)
CA-I BLDV-SCNC: 1.06 MMOL/L (ref 1.06–1.42)
CALCIUM SERPL-MCNC: 10 MG/DL (ref 8.7–10.5)
CALCIUM SERPL-MCNC: 9.6 MG/DL (ref 8.7–10.5)
CALCIUM SERPL-MCNC: 9.6 MG/DL (ref 8.7–10.5)
CALCIUM SERPL-MCNC: 9.9 MG/DL (ref 8.7–10.5)
CHLORIDE SERPL-SCNC: 91 MMOL/L (ref 95–110)
CHLORIDE SERPL-SCNC: 97 MMOL/L (ref 95–110)
CHLORIDE SERPL-SCNC: 98 MMOL/L (ref 95–110)
CHLORIDE SERPL-SCNC: 98 MMOL/L (ref 95–110)
CO2 SERPL-SCNC: 21 MMOL/L (ref 23–29)
CO2 SERPL-SCNC: 22 MMOL/L (ref 23–29)
CO2 SERPL-SCNC: 23 MMOL/L (ref 23–29)
CO2 SERPL-SCNC: 28 MMOL/L (ref 23–29)
CODING SYSTEM: NORMAL
CREAT SERPL-MCNC: 2 MG/DL (ref 0.5–1.4)
DELSYS: ABNORMAL
DIFFERENTIAL METHOD: ABNORMAL
DISPENSE STATUS: NORMAL
DOHLE BOD BLD QL SMEAR: PRESENT
EOSINOPHIL # BLD AUTO: 0 K/UL (ref 0–0.5)
EOSINOPHIL # BLD AUTO: 0.1 K/UL (ref 0–0.5)
EOSINOPHIL NFR BLD: 0 % (ref 0–8)
EOSINOPHIL NFR BLD: 0 % (ref 0–8)
EOSINOPHIL NFR BLD: 0.1 % (ref 0–8)
EOSINOPHIL NFR BLD: 0.7 % (ref 0–8)
ERYTHROCYTE [DISTWIDTH] IN BLOOD BY AUTOMATED COUNT: 14.8 % (ref 11.5–14.5)
ERYTHROCYTE [DISTWIDTH] IN BLOOD BY AUTOMATED COUNT: 14.8 % (ref 11.5–14.5)
ERYTHROCYTE [DISTWIDTH] IN BLOOD BY AUTOMATED COUNT: 14.9 % (ref 11.5–14.5)
ERYTHROCYTE [DISTWIDTH] IN BLOOD BY AUTOMATED COUNT: 14.9 % (ref 11.5–14.5)
ERYTHROCYTE [SEDIMENTATION RATE] IN BLOOD BY WESTERGREN METHOD: 18 MM/H
ERYTHROCYTE [SEDIMENTATION RATE] IN BLOOD BY WESTERGREN METHOD: 18 MM/H
ERYTHROCYTE [SEDIMENTATION RATE] IN BLOOD BY WESTERGREN METHOD: 22 MM/H
ERYTHROCYTE [SEDIMENTATION RATE] IN BLOOD BY WESTERGREN METHOD: 22 MM/H
EST. GFR  (AFRICAN AMERICAN): 47.9 ML/MIN/1.73 M^2
EST. GFR  (NON AFRICAN AMERICAN): 41.4 ML/MIN/1.73 M^2
FIBRINOGEN PPP-MCNC: 447 MG/DL (ref 182–400)
FIBRINOGEN PPP-MCNC: 492 MG/DL (ref 182–400)
FIO2: 100
FIO2: 36
FIO2: 50
FIO2: 65
FIO2: 70
FLOW: 4
GLUCOSE SERPL-MCNC: 152 MG/DL (ref 70–110)
GLUCOSE SERPL-MCNC: 169 MG/DL (ref 70–110)
GLUCOSE SERPL-MCNC: 172 MG/DL (ref 70–110)
GLUCOSE SERPL-MCNC: 174 MG/DL (ref 70–110)
GLUCOSE SERPL-MCNC: 183 MG/DL (ref 70–110)
GLUCOSE SERPL-MCNC: 189 MG/DL (ref 70–110)
GLUCOSE SERPL-MCNC: 190 MG/DL (ref 70–110)
GLUCOSE SERPL-MCNC: 191 MG/DL (ref 70–110)
GLUCOSE SERPL-MCNC: 201 MG/DL (ref 70–110)
GLUCOSE SERPL-MCNC: 204 MG/DL (ref 70–110)
GLUCOSE SERPL-MCNC: 216 MG/DL (ref 70–110)
GLUCOSE SERPL-MCNC: 234 MG/DL (ref 70–110)
GLUCOSE SERPL-MCNC: 236 MG/DL (ref 70–110)
HCO3 UR-SCNC: 20.5 MMOL/L (ref 24–28)
HCO3 UR-SCNC: 22.4 MMOL/L (ref 24–28)
HCO3 UR-SCNC: 24 MMOL/L (ref 24–28)
HCO3 UR-SCNC: 24.4 MMOL/L (ref 24–28)
HCO3 UR-SCNC: 24.4 MMOL/L (ref 24–28)
HCO3 UR-SCNC: 25.7 MMOL/L (ref 24–28)
HCO3 UR-SCNC: 26.3 MMOL/L (ref 24–28)
HCO3 UR-SCNC: 26.7 MMOL/L (ref 24–28)
HCO3 UR-SCNC: 27.1 MMOL/L (ref 24–28)
HCO3 UR-SCNC: 27.7 MMOL/L (ref 24–28)
HCO3 UR-SCNC: 27.9 MMOL/L (ref 24–28)
HCO3 UR-SCNC: 28 MMOL/L (ref 24–28)
HCO3 UR-SCNC: 28.6 MMOL/L (ref 24–28)
HCO3 UR-SCNC: 29.6 MMOL/L (ref 24–28)
HCT VFR BLD AUTO: 24.5 % (ref 40–54)
HCT VFR BLD AUTO: 24.5 % (ref 40–54)
HCT VFR BLD AUTO: 24.8 % (ref 40–54)
HCT VFR BLD AUTO: 29.3 % (ref 40–54)
HCT VFR BLD CALC: 25 %PCV (ref 36–54)
HCT VFR BLD CALC: 26 %PCV (ref 36–54)
HCT VFR BLD CALC: 30 %PCV (ref 36–54)
HCT VFR BLD CALC: 31 %PCV (ref 36–54)
HCT VFR BLD CALC: 32 %PCV (ref 36–54)
HGB BLD-MCNC: 8.2 G/DL (ref 14–18)
HGB BLD-MCNC: 8.3 G/DL (ref 14–18)
HGB BLD-MCNC: 8.3 G/DL (ref 14–18)
HGB BLD-MCNC: 9.6 G/DL (ref 14–18)
HYPOCHROMIA BLD QL SMEAR: ABNORMAL
IMM GRANULOCYTES # BLD AUTO: 0.09 K/UL (ref 0–0.04)
IMM GRANULOCYTES # BLD AUTO: 0.16 K/UL (ref 0–0.04)
IMM GRANULOCYTES # BLD AUTO: 0.34 K/UL (ref 0–0.04)
IMM GRANULOCYTES # BLD AUTO: 0.47 K/UL (ref 0–0.04)
IMM GRANULOCYTES NFR BLD AUTO: 0.6 % (ref 0–0.5)
IMM GRANULOCYTES NFR BLD AUTO: 0.8 % (ref 0–0.5)
IMM GRANULOCYTES NFR BLD AUTO: 1.4 % (ref 0–0.5)
IMM GRANULOCYTES NFR BLD AUTO: 1.5 % (ref 0–0.5)
INR PPP: 1.2 (ref 0.8–1.2)
INR PPP: 3 (ref 0.8–1.2)
LACTATE SERPL-SCNC: 0.9 MMOL/L (ref 0.5–2.2)
LDH SERPL L TO P-CCNC: 1.61 MMOL/L (ref 0.36–1.25)
LDH SERPL L TO P-CCNC: 2.55 MMOL/L (ref 0.36–1.25)
LDH SERPL L TO P-CCNC: 2.56 MMOL/L (ref 0.36–1.25)
LDH SERPL L TO P-CCNC: 2.59 MMOL/L (ref 0.36–1.25)
LDH SERPL L TO P-CCNC: 3.5 MMOL/L (ref 0.36–1.25)
LDH SERPL L TO P-CCNC: 338 U/L (ref 110–260)
LDH SERPL L TO P-CCNC: 4.92 MMOL/L (ref 0.36–1.25)
LDH SERPL L TO P-CCNC: 789 U/L (ref 110–260)
LYMPHOCYTES # BLD AUTO: 1.5 K/UL (ref 1–4.8)
LYMPHOCYTES # BLD AUTO: 1.6 K/UL (ref 1–4.8)
LYMPHOCYTES # BLD AUTO: 1.8 K/UL (ref 1–4.8)
LYMPHOCYTES # BLD AUTO: 2.1 K/UL (ref 1–4.8)
LYMPHOCYTES NFR BLD: 15 % (ref 18–48)
LYMPHOCYTES NFR BLD: 5.8 % (ref 18–48)
LYMPHOCYTES NFR BLD: 6.2 % (ref 18–48)
LYMPHOCYTES NFR BLD: 7.5 % (ref 18–48)
MAGNESIUM SERPL-MCNC: 2.5 MG/DL (ref 1.6–2.6)
MAGNESIUM SERPL-MCNC: 2.5 MG/DL (ref 1.6–2.6)
MAGNESIUM SERPL-MCNC: 2.8 MG/DL (ref 1.6–2.6)
MCH RBC QN AUTO: 28.7 PG (ref 27–31)
MCH RBC QN AUTO: 29.1 PG (ref 27–31)
MCHC RBC AUTO-ENTMCNC: 32.8 G/DL (ref 32–36)
MCHC RBC AUTO-ENTMCNC: 33.5 G/DL (ref 32–36)
MCHC RBC AUTO-ENTMCNC: 33.5 G/DL (ref 32–36)
MCHC RBC AUTO-ENTMCNC: 33.9 G/DL (ref 32–36)
MCV RBC AUTO: 85 FL (ref 82–98)
MCV RBC AUTO: 86 FL (ref 82–98)
MCV RBC AUTO: 86 FL (ref 82–98)
MCV RBC AUTO: 89 FL (ref 82–98)
METHEMOGLOBIN: 1.2 % (ref 0–3)
MIN VOL: 12
MIN VOL: 12
MODE: ABNORMAL
MONOCYTES # BLD AUTO: 0.9 K/UL (ref 0.3–1)
MONOCYTES # BLD AUTO: 1.3 K/UL (ref 0.3–1)
MONOCYTES # BLD AUTO: 1.5 K/UL (ref 0.3–1)
MONOCYTES # BLD AUTO: 2.2 K/UL (ref 0.3–1)
MONOCYTES NFR BLD: 6 % (ref 4–15)
MONOCYTES NFR BLD: 6.2 % (ref 4–15)
MONOCYTES NFR BLD: 6.2 % (ref 4–15)
MONOCYTES NFR BLD: 7.3 % (ref 4–15)
NEUTROPHILS # BLD AUTO: 10.8 K/UL (ref 1.8–7.7)
NEUTROPHILS # BLD AUTO: 17.7 K/UL (ref 1.8–7.7)
NEUTROPHILS # BLD AUTO: 21.3 K/UL (ref 1.8–7.7)
NEUTROPHILS # BLD AUTO: 25.9 K/UL (ref 1.8–7.7)
NEUTROPHILS NFR BLD: 77.1 % (ref 38–73)
NEUTROPHILS NFR BLD: 85.1 % (ref 38–73)
NEUTROPHILS NFR BLD: 85.3 % (ref 38–73)
NEUTROPHILS NFR BLD: 86.2 % (ref 38–73)
NRBC BLD-RTO: 0 /100 WBC
NUM UNITS TRANS FFP: NORMAL
OVALOCYTES BLD QL SMEAR: ABNORMAL
PCO2 BLDA: 32.2 MMHG (ref 35–45)
PCO2 BLDA: 34.3 MMHG (ref 35–45)
PCO2 BLDA: 35.3 MMHG (ref 35–45)
PCO2 BLDA: 37.4 MMHG (ref 35–45)
PCO2 BLDA: 37.6 MMHG (ref 35–45)
PCO2 BLDA: 38 MMHG (ref 35–45)
PCO2 BLDA: 40.8 MMHG (ref 35–45)
PCO2 BLDA: 42.5 MMHG (ref 35–45)
PCO2 BLDA: 42.7 MMHG (ref 35–45)
PCO2 BLDA: 44.5 MMHG (ref 35–45)
PCO2 BLDA: 44.5 MMHG (ref 35–45)
PCO2 BLDA: 44.9 MMHG (ref 35–45)
PCO2 BLDA: 45.3 MMHG (ref 35–45)
PCO2 BLDA: 51.1 MMHG (ref 35–45)
PEEP: 5
PH SMN: 7.27 [PH] (ref 7.35–7.45)
PH SMN: 7.31 [PH] (ref 7.35–7.45)
PH SMN: 7.36 [PH] (ref 7.35–7.45)
PH SMN: 7.37 [PH] (ref 7.35–7.45)
PH SMN: 7.37 [PH] (ref 7.35–7.45)
PH SMN: 7.41 [PH] (ref 7.35–7.45)
PH SMN: 7.42 [PH] (ref 7.35–7.45)
PH SMN: 7.44 [PH] (ref 7.35–7.45)
PH SMN: 7.46 [PH] (ref 7.35–7.45)
PH SMN: 7.51 [PH] (ref 7.35–7.45)
PH SMN: 7.51 [PH] (ref 7.35–7.45)
PH SMN: 7.52 [PH] (ref 7.35–7.45)
PHOSPHATE SERPL-MCNC: 3.3 MG/DL (ref 2.7–4.5)
PHOSPHATE SERPL-MCNC: 3.9 MG/DL (ref 2.7–4.5)
PHOSPHATE SERPL-MCNC: 5.1 MG/DL (ref 2.7–4.5)
PIP: 22
PIP: 23
PLATELET # BLD AUTO: 297 K/UL (ref 150–450)
PLATELET # BLD AUTO: 305 K/UL (ref 150–450)
PLATELET # BLD AUTO: 306 K/UL (ref 150–450)
PLATELET # BLD AUTO: 368 K/UL (ref 150–450)
PLATELET BLD QL SMEAR: ABNORMAL
PMV BLD AUTO: 9.3 FL (ref 9.2–12.9)
PMV BLD AUTO: 9.6 FL (ref 9.2–12.9)
PMV BLD AUTO: 9.6 FL (ref 9.2–12.9)
PMV BLD AUTO: 9.8 FL (ref 9.2–12.9)
PO2 BLDA: 135 MMHG (ref 80–100)
PO2 BLDA: 135 MMHG (ref 80–100)
PO2 BLDA: 199 MMHG (ref 80–100)
PO2 BLDA: 212 MMHG (ref 80–100)
PO2 BLDA: 218 MMHG (ref 80–100)
PO2 BLDA: 223 MMHG (ref 80–100)
PO2 BLDA: 25 MMHG (ref 40–60)
PO2 BLDA: 289 MMHG (ref 80–100)
PO2 BLDA: 320 MMHG (ref 80–100)
PO2 BLDA: 322 MMHG (ref 80–100)
PO2 BLDA: 329 MMHG (ref 80–100)
PO2 BLDA: 332 MMHG (ref 80–100)
PO2 BLDA: 42 MMHG (ref 40–60)
PO2 BLDA: 456 MMHG (ref 80–100)
POC BE: -2 MMOL/L
POC BE: -4 MMOL/L
POC BE: -6 MMOL/L
POC BE: 0 MMOL/L
POC BE: 3 MMOL/L
POC BE: 3 MMOL/L
POC BE: 4 MMOL/L
POC BE: 5 MMOL/L
POC IONIZED CALCIUM: 1.05 MMOL/L (ref 1.06–1.42)
POC IONIZED CALCIUM: 1.09 MMOL/L (ref 1.06–1.42)
POC IONIZED CALCIUM: 1.09 MMOL/L (ref 1.06–1.42)
POC IONIZED CALCIUM: 1.13 MMOL/L (ref 1.06–1.42)
POC IONIZED CALCIUM: 1.18 MMOL/L (ref 1.06–1.42)
POC IONIZED CALCIUM: 1.23 MMOL/L (ref 1.06–1.42)
POC IONIZED CALCIUM: 1.24 MMOL/L (ref 1.06–1.42)
POC IONIZED CALCIUM: 1.26 MMOL/L (ref 1.06–1.42)
POC IONIZED CALCIUM: 1.27 MMOL/L (ref 1.06–1.42)
POC IONIZED CALCIUM: 1.3 MMOL/L (ref 1.06–1.42)
POC IONIZED CALCIUM: 1.37 MMOL/L (ref 1.06–1.42)
POC IONIZED CALCIUM: 1.42 MMOL/L (ref 1.06–1.42)
POC IONIZED CALCIUM: 1.51 MMOL/L (ref 1.06–1.42)
POC SATURATED O2: 100 % (ref 95–100)
POC SATURATED O2: 42 % (ref 95–100)
POC SATURATED O2: 78 % (ref 95–100)
POC SATURATED O2: 99 % (ref 95–100)
POC SATURATED O2: 99 % (ref 95–100)
POC SVO2: 42 %
POC TCO2: 22 MMOL/L (ref 23–27)
POC TCO2: 24 MMOL/L (ref 23–27)
POC TCO2: 25 MMOL/L (ref 23–27)
POC TCO2: 25 MMOL/L (ref 23–27)
POC TCO2: 26 MMOL/L (ref 23–27)
POC TCO2: 27 MMOL/L (ref 23–27)
POC TCO2: 27 MMOL/L (ref 23–27)
POC TCO2: 28 MMOL/L (ref 23–27)
POC TCO2: 28 MMOL/L (ref 23–27)
POC TCO2: 29 MMOL/L (ref 23–27)
POC TCO2: 29 MMOL/L (ref 23–27)
POC TCO2: 29 MMOL/L (ref 24–29)
POC TCO2: 30 MMOL/L (ref 23–27)
POC TCO2: 31 MMOL/L (ref 24–29)
POCT GLUCOSE: 131 MG/DL (ref 70–110)
POCT GLUCOSE: 146 MG/DL (ref 70–110)
POCT GLUCOSE: 170 MG/DL (ref 70–110)
POCT GLUCOSE: 180 MG/DL (ref 70–110)
POCT GLUCOSE: 181 MG/DL (ref 70–110)
POCT GLUCOSE: 182 MG/DL (ref 70–110)
POCT GLUCOSE: 192 MG/DL (ref 70–110)
POCT GLUCOSE: 195 MG/DL (ref 70–110)
POCT GLUCOSE: 198 MG/DL (ref 70–110)
POCT GLUCOSE: 202 MG/DL (ref 70–110)
POCT GLUCOSE: 211 MG/DL (ref 70–110)
POIKILOCYTOSIS BLD QL SMEAR: SLIGHT
POLYCHROMASIA BLD QL SMEAR: ABNORMAL
POTASSIUM BLD-SCNC: 4.2 MMOL/L (ref 3.5–5.1)
POTASSIUM BLD-SCNC: 4.4 MMOL/L (ref 3.5–5.1)
POTASSIUM BLD-SCNC: 4.5 MMOL/L (ref 3.5–5.1)
POTASSIUM BLD-SCNC: 4.5 MMOL/L (ref 3.5–5.1)
POTASSIUM BLD-SCNC: 4.6 MMOL/L (ref 3.5–5.1)
POTASSIUM BLD-SCNC: 4.6 MMOL/L (ref 3.5–5.1)
POTASSIUM BLD-SCNC: 4.7 MMOL/L (ref 3.5–5.1)
POTASSIUM BLD-SCNC: 4.8 MMOL/L (ref 3.5–5.1)
POTASSIUM BLD-SCNC: 4.8 MMOL/L (ref 3.5–5.1)
POTASSIUM BLD-SCNC: 4.9 MMOL/L (ref 3.5–5.1)
POTASSIUM BLD-SCNC: 5 MMOL/L (ref 3.5–5.1)
POTASSIUM BLD-SCNC: 5.3 MMOL/L (ref 3.5–5.1)
POTASSIUM BLD-SCNC: 5.7 MMOL/L (ref 3.5–5.1)
POTASSIUM SERPL-SCNC: 3.8 MMOL/L (ref 3.5–5.1)
POTASSIUM SERPL-SCNC: 5.1 MMOL/L (ref 3.5–5.1)
POTASSIUM SERPL-SCNC: 5.2 MMOL/L (ref 3.5–5.1)
POTASSIUM SERPL-SCNC: 5.3 MMOL/L (ref 3.5–5.1)
PROT SERPL-MCNC: 8.2 G/DL (ref 6–8.4)
PROTHROMBIN TIME: 11.9 SEC (ref 9–12.5)
PROTHROMBIN TIME: 12.1 SEC (ref 9–12.5)
PROTHROMBIN TIME: 12.4 SEC (ref 9–12.5)
PROTHROMBIN TIME: 28.9 SEC (ref 9–12.5)
RBC # BLD AUTO: 2.86 M/UL (ref 4.6–6.2)
RBC # BLD AUTO: 2.89 M/UL (ref 4.6–6.2)
RBC # BLD AUTO: 2.89 M/UL (ref 4.6–6.2)
RBC # BLD AUTO: 3.3 M/UL (ref 4.6–6.2)
SAMPLE: ABNORMAL
SITE: ABNORMAL
SODIUM BLD-SCNC: 129 MMOL/L (ref 136–145)
SODIUM BLD-SCNC: 130 MMOL/L (ref 136–145)
SODIUM BLD-SCNC: 131 MMOL/L (ref 136–145)
SODIUM BLD-SCNC: 131 MMOL/L (ref 136–145)
SODIUM BLD-SCNC: 132 MMOL/L (ref 136–145)
SODIUM BLD-SCNC: 132 MMOL/L (ref 136–145)
SODIUM BLD-SCNC: 133 MMOL/L (ref 136–145)
SODIUM BLD-SCNC: 134 MMOL/L (ref 136–145)
SODIUM BLD-SCNC: 135 MMOL/L (ref 136–145)
SODIUM SERPL-SCNC: 130 MMOL/L (ref 136–145)
SODIUM SERPL-SCNC: 133 MMOL/L (ref 136–145)
SODIUM SERPL-SCNC: 133 MMOL/L (ref 136–145)
SODIUM SERPL-SCNC: 135 MMOL/L (ref 136–145)
TARGETS BLD QL SMEAR: ABNORMAL
TOXIC GRANULES BLD QL SMEAR: PRESENT
VT: 580
WBC # BLD AUTO: 13.96 K/UL (ref 3.9–12.7)
WBC # BLD AUTO: 20.81 K/UL (ref 3.9–12.7)
WBC # BLD AUTO: 24.75 K/UL (ref 3.9–12.7)
WBC # BLD AUTO: 30.41 K/UL (ref 3.9–12.7)

## 2022-06-29 PROCEDURE — 36000713 HC OR TIME LEV V EA ADD 15 MIN: Performed by: THORACIC SURGERY (CARDIOTHORACIC VASCULAR SURGERY)

## 2022-06-29 PROCEDURE — 83615 LACTATE (LD) (LDH) ENZYME: CPT | Performed by: PHYSICIAN ASSISTANT

## 2022-06-29 PROCEDURE — 99900035 HC TECH TIME PER 15 MIN (STAT): Mod: NTX

## 2022-06-29 PROCEDURE — 82803 BLOOD GASES ANY COMBINATION: CPT | Mod: NTX

## 2022-06-29 PROCEDURE — 37799 UNLISTED PX VASCULAR SURGERY: CPT

## 2022-06-29 PROCEDURE — 93750 INTERROGATION VAD IN PERSON: CPT | Mod: ,,, | Performed by: INTERNAL MEDICINE

## 2022-06-29 PROCEDURE — 84132 ASSAY OF SERUM POTASSIUM: CPT

## 2022-06-29 PROCEDURE — 37799 UNLISTED PX VASCULAR SURGERY: CPT | Mod: NTX

## 2022-06-29 PROCEDURE — 83605 ASSAY OF LACTIC ACID: CPT

## 2022-06-29 PROCEDURE — 84295 ASSAY OF SERUM SODIUM: CPT

## 2022-06-29 PROCEDURE — 25000003 PHARM REV CODE 250: Mod: NTX | Performed by: NURSE PRACTITIONER

## 2022-06-29 PROCEDURE — 36430 TRANSFUSION BLD/BLD COMPNT: CPT

## 2022-06-29 PROCEDURE — 85610 PROTHROMBIN TIME: CPT | Performed by: ANESTHESIOLOGY

## 2022-06-29 PROCEDURE — P9045 ALBUMIN (HUMAN), 5%, 250 ML: HCPCS | Mod: JG

## 2022-06-29 PROCEDURE — 93320 DOPPLER ECHO COMPLETE: CPT | Mod: 26,59,, | Performed by: ANESTHESIOLOGY

## 2022-06-29 PROCEDURE — 99291 CRITICAL CARE FIRST HOUR: CPT | Mod: ,,, | Performed by: ANESTHESIOLOGY

## 2022-06-29 PROCEDURE — 84100 ASSAY OF PHOSPHORUS: CPT | Mod: 91 | Performed by: STUDENT IN AN ORGANIZED HEALTH CARE EDUCATION/TRAINING PROGRAM

## 2022-06-29 PROCEDURE — 27000221 HC OXYGEN, UP TO 24 HOURS: Mod: NTX

## 2022-06-29 PROCEDURE — 33979 INSERT INTRACORPOREAL DEVICE: CPT | Mod: 51,,, | Performed by: THORACIC SURGERY (CARDIOTHORACIC VASCULAR SURGERY)

## 2022-06-29 PROCEDURE — 85025 COMPLETE CBC W/AUTO DIFF WBC: CPT | Mod: 91 | Performed by: STUDENT IN AN ORGANIZED HEALTH CARE EDUCATION/TRAINING PROGRAM

## 2022-06-29 PROCEDURE — 36000712 HC OR TIME LEV V 1ST 15 MIN: Performed by: THORACIC SURGERY (CARDIOTHORACIC VASCULAR SURGERY)

## 2022-06-29 PROCEDURE — 25000003 PHARM REV CODE 250: Performed by: THORACIC SURGERY (CARDIOTHORACIC VASCULAR SURGERY)

## 2022-06-29 PROCEDURE — 27201015 HC HEMO-CONCENTRATOR: Mod: NTX

## 2022-06-29 PROCEDURE — 63600367 HC NITRIC OXIDE PER HOUR

## 2022-06-29 PROCEDURE — 25000003 PHARM REV CODE 250: Mod: NTX

## 2022-06-29 PROCEDURE — 83735 ASSAY OF MAGNESIUM: CPT | Mod: 91 | Performed by: STUDENT IN AN ORGANIZED HEALTH CARE EDUCATION/TRAINING PROGRAM

## 2022-06-29 PROCEDURE — 82330 ASSAY OF CALCIUM: CPT

## 2022-06-29 PROCEDURE — 93325 DOPPLER ECHO COLOR FLOW MAPG: CPT | Mod: 26,59,, | Performed by: ANESTHESIOLOGY

## 2022-06-29 PROCEDURE — 36556 INSERT NON-TUNNEL CV CATH: CPT | Mod: 59,NTX,, | Performed by: ANESTHESIOLOGY

## 2022-06-29 PROCEDURE — 76937 PR  US GUIDE, VASCULAR ACCESS: ICD-10-PCS | Mod: 26,NTX,, | Performed by: ANESTHESIOLOGY

## 2022-06-29 PROCEDURE — 83050 HGB METHEMOGLOBIN QUAN: CPT | Mod: NTX

## 2022-06-29 PROCEDURE — 63600175 PHARM REV CODE 636 W HCPCS: Mod: NTX

## 2022-06-29 PROCEDURE — 27200953 HC CARDIOPLEGIA SYSTEM: Mod: NTX

## 2022-06-29 PROCEDURE — 27100026 HC SHUNT SENSOR, TERUMO: Mod: NTX

## 2022-06-29 PROCEDURE — 93312 ECHO TRANSESOPHAGEAL: CPT | Mod: 26,59,NTX, | Performed by: ANESTHESIOLOGY

## 2022-06-29 PROCEDURE — 27000443 HC TANDEM HEART SETUP: Mod: NTX

## 2022-06-29 PROCEDURE — 93750 PR INTERROGATE VENT ASSIST DEV, IN PERSON, W PHYSICIAN ANALYSIS: ICD-10-PCS | Mod: ,,, | Performed by: INTERNAL MEDICINE

## 2022-06-29 PROCEDURE — 85384 FIBRINOGEN ACTIVITY: CPT | Mod: 91 | Performed by: INTERNAL MEDICINE

## 2022-06-29 PROCEDURE — 25000003 PHARM REV CODE 250: Mod: NTX | Performed by: INTERNAL MEDICINE

## 2022-06-29 PROCEDURE — 88305 TISSUE EXAM BY PATHOLOGIST: CPT | Mod: 26,,, | Performed by: PATHOLOGY

## 2022-06-29 PROCEDURE — 27000202 HC IABP, ADD'L DAY: Mod: NTX

## 2022-06-29 PROCEDURE — 93325 PR DOPPLER COLOR FLOW VELOCITY MAP: ICD-10-PCS | Mod: 26,59,, | Performed by: ANESTHESIOLOGY

## 2022-06-29 PROCEDURE — P9017 PLASMA 1 DONOR FRZ W/IN 8 HR: HCPCS | Performed by: THORACIC SURGERY (CARDIOTHORACIC VASCULAR SURGERY)

## 2022-06-29 PROCEDURE — 82330 ASSAY OF CALCIUM: CPT | Performed by: PHYSICIAN ASSISTANT

## 2022-06-29 PROCEDURE — D9220A PRA ANESTHESIA: ICD-10-PCS | Mod: NTX,,, | Performed by: ANESTHESIOLOGY

## 2022-06-29 PROCEDURE — 36620 INSERTION CATHETER ARTERY: CPT | Mod: 59,NTX,, | Performed by: ANESTHESIOLOGY

## 2022-06-29 PROCEDURE — 83605 ASSAY OF LACTIC ACID: CPT | Performed by: PHYSICIAN ASSISTANT

## 2022-06-29 PROCEDURE — 20000000 HC ICU ROOM

## 2022-06-29 PROCEDURE — P9016 RBC LEUKOCYTES REDUCED: HCPCS | Performed by: THORACIC SURGERY (CARDIOTHORACIC VASCULAR SURGERY)

## 2022-06-29 PROCEDURE — 27201037 HC PRESSURE MONITORING SET UP: Mod: NTX

## 2022-06-29 PROCEDURE — 27202058

## 2022-06-29 PROCEDURE — 85520 HEPARIN ASSAY: CPT | Mod: NTX

## 2022-06-29 PROCEDURE — 27801475 HC HEARTMATE III IMPLANT KIT: Mod: NTX

## 2022-06-29 PROCEDURE — 99291 PR CRITICAL CARE, E/M 30-74 MINUTES: ICD-10-PCS | Mod: ,,, | Performed by: ANESTHESIOLOGY

## 2022-06-29 PROCEDURE — 37000009 HC ANESTHESIA EA ADD 15 MINS: Performed by: THORACIC SURGERY (CARDIOTHORACIC VASCULAR SURGERY)

## 2022-06-29 PROCEDURE — C1729 CATH, DRAINAGE: HCPCS | Performed by: THORACIC SURGERY (CARDIOTHORACIC VASCULAR SURGERY)

## 2022-06-29 PROCEDURE — 99900035 HC TECH TIME PER 15 MIN (STAT)

## 2022-06-29 PROCEDURE — 27201041 HC RESERVOIR, CARDIOTOMY: Mod: NTX

## 2022-06-29 PROCEDURE — 85025 COMPLETE CBC W/AUTO DIFF WBC: CPT | Mod: 91 | Performed by: INTERNAL MEDICINE

## 2022-06-29 PROCEDURE — 27201423 OPTIME MED/SURG SUP & DEVICES STERILE SUPPLY: Performed by: THORACIC SURGERY (CARDIOTHORACIC VASCULAR SURGERY)

## 2022-06-29 PROCEDURE — 25000003 PHARM REV CODE 250: Mod: NTX | Performed by: THORACIC SURGERY (CARDIOTHORACIC VASCULAR SURGERY)

## 2022-06-29 PROCEDURE — 85384 FIBRINOGEN ACTIVITY: CPT | Performed by: ANESTHESIOLOGY

## 2022-06-29 PROCEDURE — 80048 BASIC METABOLIC PNL TOTAL CA: CPT | Mod: 91,XB | Performed by: STUDENT IN AN ORGANIZED HEALTH CARE EDUCATION/TRAINING PROGRAM

## 2022-06-29 PROCEDURE — 93312 PR ECHO HEART,TRANSESOPHAGEAL: ICD-10-PCS | Mod: 26,59,NTX, | Performed by: ANESTHESIOLOGY

## 2022-06-29 PROCEDURE — 27202608 HC CANNULA, MISC: Mod: NTX

## 2022-06-29 PROCEDURE — 83735 ASSAY OF MAGNESIUM: CPT | Performed by: INTERNAL MEDICINE

## 2022-06-29 PROCEDURE — 36556 PR INSERT NON-TUNNEL CV CATH 5+ YRS OLD: ICD-10-PCS | Mod: 59,NTX,, | Performed by: ANESTHESIOLOGY

## 2022-06-29 PROCEDURE — 88305 TISSUE EXAM BY PATHOLOGIST: CPT | Performed by: PATHOLOGY

## 2022-06-29 PROCEDURE — 63600175 PHARM REV CODE 636 W HCPCS: Mod: JG

## 2022-06-29 PROCEDURE — 33425 PR MITRALPLASTY W CP BYPASS: ICD-10-PCS | Mod: 22,,, | Performed by: THORACIC SURGERY (CARDIOTHORACIC VASCULAR SURGERY)

## 2022-06-29 PROCEDURE — C1768 GRAFT, VASCULAR: HCPCS | Performed by: THORACIC SURGERY (CARDIOTHORACIC VASCULAR SURGERY)

## 2022-06-29 PROCEDURE — 63600175 PHARM REV CODE 636 W HCPCS: Performed by: STUDENT IN AN ORGANIZED HEALTH CARE EDUCATION/TRAINING PROGRAM

## 2022-06-29 PROCEDURE — 99233 PR SUBSEQUENT HOSPITAL CARE,LEVL III: ICD-10-PCS | Mod: NTX,,, | Performed by: NURSE PRACTITIONER

## 2022-06-29 PROCEDURE — 80048 BASIC METABOLIC PNL TOTAL CA: CPT | Mod: XB | Performed by: INTERNAL MEDICINE

## 2022-06-29 PROCEDURE — 85025 COMPLETE CBC W/AUTO DIFF WBC: CPT | Performed by: INTERNAL MEDICINE

## 2022-06-29 PROCEDURE — 88305 TISSUE EXAM BY PATHOLOGIST: ICD-10-PCS | Mod: 26,,, | Performed by: PATHOLOGY

## 2022-06-29 PROCEDURE — 63600531 PHARM REV CODE 636 NO ALT 250 W HCPCS: Mod: JG,NTX

## 2022-06-29 PROCEDURE — 25000003 PHARM REV CODE 250: Performed by: STUDENT IN AN ORGANIZED HEALTH CARE EDUCATION/TRAINING PROGRAM

## 2022-06-29 PROCEDURE — 85610 PROTHROMBIN TIME: CPT | Mod: 91 | Performed by: INTERNAL MEDICINE

## 2022-06-29 PROCEDURE — 36620 ARTERIAL: ICD-10-PCS | Mod: 59,NTX,, | Performed by: ANESTHESIOLOGY

## 2022-06-29 PROCEDURE — 94761 N-INVAS EAR/PLS OXIMETRY MLT: CPT | Mod: NTX

## 2022-06-29 PROCEDURE — 85014 HEMATOCRIT: CPT

## 2022-06-29 PROCEDURE — 85730 THROMBOPLASTIN TIME PARTIAL: CPT | Performed by: INTERNAL MEDICINE

## 2022-06-29 PROCEDURE — 25500020 PHARM REV CODE 255: Mod: NTX | Performed by: THORACIC SURGERY (CARDIOTHORACIC VASCULAR SURGERY)

## 2022-06-29 PROCEDURE — 94002 VENT MGMT INPAT INIT DAY: CPT | Mod: NTX

## 2022-06-29 PROCEDURE — 27000191 HC C-V MONITORING: Mod: NTX

## 2022-06-29 PROCEDURE — 36600 WITHDRAWAL OF ARTERIAL BLOOD: CPT | Mod: NTX

## 2022-06-29 PROCEDURE — 85730 THROMBOPLASTIN TIME PARTIAL: CPT | Mod: 91 | Performed by: STUDENT IN AN ORGANIZED HEALTH CARE EDUCATION/TRAINING PROGRAM

## 2022-06-29 PROCEDURE — 93320 PR DOPPLER ECHO HEART,COMPLETE: ICD-10-PCS | Mod: 26,59,, | Performed by: ANESTHESIOLOGY

## 2022-06-29 PROCEDURE — C1769 GUIDE WIRE: HCPCS | Mod: NTX

## 2022-06-29 PROCEDURE — 63600175 PHARM REV CODE 636 W HCPCS: Performed by: THORACIC SURGERY (CARDIOTHORACIC VASCULAR SURGERY)

## 2022-06-29 PROCEDURE — 84100 ASSAY OF PHOSPHORUS: CPT | Performed by: INTERNAL MEDICINE

## 2022-06-29 PROCEDURE — 63600175 PHARM REV CODE 636 W HCPCS: Mod: NTX | Performed by: NURSE PRACTITIONER

## 2022-06-29 PROCEDURE — C1769 GUIDE WIRE: HCPCS | Performed by: THORACIC SURGERY (CARDIOTHORACIC VASCULAR SURGERY)

## 2022-06-29 PROCEDURE — 63600367 HC NITRIC OXIDE PER HOUR: Mod: NTX

## 2022-06-29 PROCEDURE — 99233 SBSQ HOSP IP/OBS HIGH 50: CPT | Mod: NTX,,, | Performed by: NURSE PRACTITIONER

## 2022-06-29 PROCEDURE — 33425 REPAIR OF MITRAL VALVE: CPT | Mod: 22,,, | Performed by: THORACIC SURGERY (CARDIOTHORACIC VASCULAR SURGERY)

## 2022-06-29 PROCEDURE — 83605 ASSAY OF LACTIC ACID: CPT | Mod: NTX

## 2022-06-29 PROCEDURE — 76937 US GUIDE VASCULAR ACCESS: CPT | Mod: 26,NTX,, | Performed by: ANESTHESIOLOGY

## 2022-06-29 PROCEDURE — 37000008 HC ANESTHESIA 1ST 15 MINUTES: Performed by: THORACIC SURGERY (CARDIOTHORACIC VASCULAR SURGERY)

## 2022-06-29 PROCEDURE — 85730 THROMBOPLASTIN TIME PARTIAL: CPT | Mod: 91 | Performed by: ANESTHESIOLOGY

## 2022-06-29 PROCEDURE — 36592 COLLECT BLOOD FROM PICC: CPT | Mod: NTX

## 2022-06-29 PROCEDURE — 83615 LACTATE (LD) (LDH) ENZYME: CPT | Mod: 91 | Performed by: STUDENT IN AN ORGANIZED HEALTH CARE EDUCATION/TRAINING PROGRAM

## 2022-06-29 PROCEDURE — C1760 CLOSURE DEV, VASC: HCPCS | Performed by: THORACIC SURGERY (CARDIOTHORACIC VASCULAR SURGERY)

## 2022-06-29 PROCEDURE — 85610 PROTHROMBIN TIME: CPT | Mod: 91 | Performed by: STUDENT IN AN ORGANIZED HEALTH CARE EDUCATION/TRAINING PROGRAM

## 2022-06-29 PROCEDURE — D9220A PRA ANESTHESIA: Mod: NTX,,, | Performed by: ANESTHESIOLOGY

## 2022-06-29 PROCEDURE — 27000175 HC ADULT BYPASS PUMP: Mod: NTX

## 2022-06-29 PROCEDURE — 80053 COMPREHEN METABOLIC PANEL: CPT | Performed by: NURSE PRACTITIONER

## 2022-06-29 PROCEDURE — 33979 PR INSERT VENT ASST DEV,IMPLANT,SINGLE VENT: ICD-10-PCS | Mod: 51,,, | Performed by: THORACIC SURGERY (CARDIOTHORACIC VASCULAR SURGERY)

## 2022-06-29 PROCEDURE — 85730 THROMBOPLASTIN TIME PARTIAL: CPT | Mod: 91 | Performed by: INTERNAL MEDICINE

## 2022-06-29 DEVICE — FELT TEFLON 1INX6IN: Type: IMPLANTABLE DEVICE | Site: HEART | Status: FUNCTIONAL

## 2022-06-29 RX ORDER — SODIUM CHLORIDE 9 MG/ML
INJECTION, SOLUTION INTRAVENOUS CONTINUOUS
Status: DISCONTINUED | OUTPATIENT
Start: 2022-06-29 | End: 2022-07-28 | Stop reason: HOSPADM

## 2022-06-29 RX ORDER — MUPIROCIN 20 MG/G
OINTMENT TOPICAL
Status: DISCONTINUED | OUTPATIENT
Start: 2022-06-29 | End: 2022-06-29 | Stop reason: HOSPADM

## 2022-06-29 RX ORDER — HYDROCODONE BITARTRATE AND ACETAMINOPHEN 500; 5 MG/1; MG/1
TABLET ORAL
Status: DISCONTINUED | OUTPATIENT
Start: 2022-06-29 | End: 2022-07-04

## 2022-06-29 RX ORDER — BISACODYL 10 MG
10 SUPPOSITORY, RECTAL RECTAL DAILY PRN
Status: DISCONTINUED | OUTPATIENT
Start: 2022-06-29 | End: 2022-07-28 | Stop reason: HOSPADM

## 2022-06-29 RX ORDER — FAMOTIDINE 10 MG/ML
20 INJECTION INTRAVENOUS 2 TIMES DAILY
Status: DISCONTINUED | OUTPATIENT
Start: 2022-06-29 | End: 2022-06-29

## 2022-06-29 RX ORDER — ALBUMIN HUMAN 50 G/1000ML
25 SOLUTION INTRAVENOUS ONCE
Status: COMPLETED | OUTPATIENT
Start: 2022-06-29 | End: 2022-06-29

## 2022-06-29 RX ORDER — FENTANYL CITRATE 50 UG/ML
50 INJECTION, SOLUTION INTRAMUSCULAR; INTRAVENOUS
Status: DISCONTINUED | OUTPATIENT
Start: 2022-06-29 | End: 2022-06-29

## 2022-06-29 RX ORDER — OXYCODONE HYDROCHLORIDE 10 MG/1
10 TABLET ORAL EVERY 4 HOURS PRN
Status: DISCONTINUED | OUTPATIENT
Start: 2022-06-29 | End: 2022-07-01

## 2022-06-29 RX ORDER — NICARDIPINE HYDROCHLORIDE 0.2 MG/ML
INJECTION INTRAVENOUS
Status: COMPLETED
Start: 2022-06-29 | End: 2022-06-29

## 2022-06-29 RX ORDER — ALBUTEROL SULFATE 2.5 MG/.5ML
2.5 SOLUTION RESPIRATORY (INHALATION) EVERY 4 HOURS PRN
Status: DISCONTINUED | OUTPATIENT
Start: 2022-06-29 | End: 2022-06-29

## 2022-06-29 RX ORDER — PROPOFOL 10 MG/ML
VIAL (ML) INTRAVENOUS CONTINUOUS PRN
Status: DISCONTINUED | OUTPATIENT
Start: 2022-06-29 | End: 2022-06-29

## 2022-06-29 RX ORDER — FUROSEMIDE 10 MG/ML
INJECTION INTRAMUSCULAR; INTRAVENOUS
Status: DISCONTINUED | OUTPATIENT
Start: 2022-06-29 | End: 2022-06-29

## 2022-06-29 RX ORDER — CEFEPIME HYDROCHLORIDE 1 G/50ML
2 INJECTION, SOLUTION INTRAVENOUS
Status: DISCONTINUED | OUTPATIENT
Start: 2022-06-29 | End: 2022-07-01

## 2022-06-29 RX ORDER — MAGNESIUM SULFATE HEPTAHYDRATE 40 MG/ML
INJECTION, SOLUTION INTRAVENOUS
Status: DISCONTINUED | OUTPATIENT
Start: 2022-06-29 | End: 2022-06-29

## 2022-06-29 RX ORDER — ROCURONIUM BROMIDE 10 MG/ML
INJECTION, SOLUTION INTRAVENOUS
Status: DISCONTINUED | OUTPATIENT
Start: 2022-06-29 | End: 2022-06-29

## 2022-06-29 RX ORDER — FERROUS GLUCONATE 324(37.5)
324 TABLET ORAL
Status: DISCONTINUED | OUTPATIENT
Start: 2022-06-30 | End: 2022-07-28 | Stop reason: HOSPADM

## 2022-06-29 RX ORDER — TRANEXAMIC ACID 100 MG/ML
INJECTION, SOLUTION INTRAVENOUS
Status: DISCONTINUED | OUTPATIENT
Start: 2022-06-29 | End: 2022-06-29

## 2022-06-29 RX ORDER — FENTANYL CITRATE 50 UG/ML
INJECTION, SOLUTION INTRAMUSCULAR; INTRAVENOUS
Status: DISCONTINUED | OUTPATIENT
Start: 2022-06-29 | End: 2022-06-29

## 2022-06-29 RX ORDER — ETOMIDATE 2 MG/ML
INJECTION INTRAVENOUS
Status: DISCONTINUED | OUTPATIENT
Start: 2022-06-29 | End: 2022-06-29

## 2022-06-29 RX ORDER — VANCOMYCIN HYDROCHLORIDE 1 G/20ML
INJECTION, POWDER, LYOPHILIZED, FOR SOLUTION INTRAVENOUS
Status: DISCONTINUED | OUTPATIENT
Start: 2022-06-29 | End: 2022-06-29

## 2022-06-29 RX ORDER — CEFEPIME HYDROCHLORIDE 1 G/50ML
2 INJECTION, SOLUTION INTRAVENOUS
Status: COMPLETED | OUTPATIENT
Start: 2022-06-29 | End: 2022-06-29

## 2022-06-29 RX ORDER — VASOPRESSIN 20 [USP'U]/ML
INJECTION, SOLUTION INTRAMUSCULAR; SUBCUTANEOUS
Status: DISCONTINUED | OUTPATIENT
Start: 2022-06-29 | End: 2022-06-29

## 2022-06-29 RX ORDER — PROPOFOL 10 MG/ML
INJECTION, EMULSION INTRAVENOUS
Status: DISPENSED
Start: 2022-06-29 | End: 2022-06-29

## 2022-06-29 RX ORDER — MUPIROCIN 20 MG/G
1 OINTMENT TOPICAL 2 TIMES DAILY
Status: DISCONTINUED | OUTPATIENT
Start: 2022-06-29 | End: 2022-06-29 | Stop reason: HOSPADM

## 2022-06-29 RX ORDER — ALBUMIN HUMAN 50 G/1000ML
SOLUTION INTRAVENOUS
Status: COMPLETED
Start: 2022-06-29 | End: 2022-06-29

## 2022-06-29 RX ORDER — ADHESIVE BANDAGE
30 BANDAGE TOPICAL DAILY PRN
Status: DISCONTINUED | OUTPATIENT
Start: 2022-06-29 | End: 2022-07-12

## 2022-06-29 RX ORDER — SODIUM CHLORIDE 0.9 % (FLUSH) 0.9 %
10 SYRINGE (ML) INJECTION
Status: DISCONTINUED | OUTPATIENT
Start: 2022-06-29 | End: 2022-07-28 | Stop reason: HOSPADM

## 2022-06-29 RX ORDER — POTASSIUM CHLORIDE 14.9 MG/ML
INJECTION INTRAVENOUS CONTINUOUS PRN
Status: DISCONTINUED | OUTPATIENT
Start: 2022-06-29 | End: 2022-06-29

## 2022-06-29 RX ORDER — NICARDIPINE HYDROCHLORIDE 0.2 MG/ML
5 INJECTION INTRAVENOUS CONTINUOUS
Status: DISCONTINUED | OUTPATIENT
Start: 2022-06-29 | End: 2022-07-12

## 2022-06-29 RX ORDER — OXYCODONE HYDROCHLORIDE 5 MG/1
5 TABLET ORAL EVERY 4 HOURS PRN
Status: DISCONTINUED | OUTPATIENT
Start: 2022-06-29 | End: 2022-07-01

## 2022-06-29 RX ORDER — SODIUM CHLORIDE 0.9 % (FLUSH) 0.9 %
.1-1 SYRINGE (ML) INJECTION
Status: DISCONTINUED | OUTPATIENT
Start: 2022-06-29 | End: 2022-07-28 | Stop reason: HOSPADM

## 2022-06-29 RX ORDER — MIDAZOLAM HYDROCHLORIDE 1 MG/ML
INJECTION INTRAMUSCULAR; INTRAVENOUS
Status: DISCONTINUED | OUTPATIENT
Start: 2022-06-29 | End: 2022-06-29

## 2022-06-29 RX ORDER — NITROGLYCERIN 5 MG/ML
INJECTION, SOLUTION INTRAVENOUS
Status: DISCONTINUED | OUTPATIENT
Start: 2022-06-29 | End: 2022-06-29

## 2022-06-29 RX ORDER — HYDROXYZINE HYDROCHLORIDE 10 MG/1
10 TABLET, FILM COATED ORAL ONCE
Status: COMPLETED | OUTPATIENT
Start: 2022-06-29 | End: 2022-06-29

## 2022-06-29 RX ORDER — NOREPINEPHRINE BITARTRATE 1 MG/ML
INJECTION, SOLUTION INTRAVENOUS
Status: DISCONTINUED | OUTPATIENT
Start: 2022-06-29 | End: 2022-06-29

## 2022-06-29 RX ORDER — DOCUSATE SODIUM 100 MG/1
200 CAPSULE, LIQUID FILLED ORAL NIGHTLY
Status: DISCONTINUED | OUTPATIENT
Start: 2022-06-29 | End: 2022-06-30

## 2022-06-29 RX ORDER — MAGNESIUM SULFATE HEPTAHYDRATE 40 MG/ML
2 INJECTION, SOLUTION INTRAVENOUS
Status: DISCONTINUED | OUTPATIENT
Start: 2022-06-29 | End: 2022-07-28 | Stop reason: HOSPADM

## 2022-06-29 RX ORDER — HEPARIN SODIUM 1000 [USP'U]/ML
INJECTION, SOLUTION INTRAVENOUS; SUBCUTANEOUS
Status: DISCONTINUED | OUTPATIENT
Start: 2022-06-29 | End: 2022-06-29

## 2022-06-29 RX ORDER — FENTANYL CITRATE 50 UG/ML
25 INJECTION, SOLUTION INTRAMUSCULAR; INTRAVENOUS EVERY 4 HOURS PRN
Status: DISCONTINUED | OUTPATIENT
Start: 2022-06-29 | End: 2022-07-01

## 2022-06-29 RX ORDER — ALBUTEROL SULFATE 2.5 MG/.5ML
2.5 SOLUTION RESPIRATORY (INHALATION) EVERY 4 HOURS PRN
Status: DISCONTINUED | OUTPATIENT
Start: 2022-06-29 | End: 2022-07-28 | Stop reason: HOSPADM

## 2022-06-29 RX ORDER — ALBUMIN HUMAN 50 G/1000ML
25 SOLUTION INTRAVENOUS
Status: DISCONTINUED | OUTPATIENT
Start: 2022-06-29 | End: 2022-07-12

## 2022-06-29 RX ORDER — PROPOFOL 10 MG/ML
0-50 INJECTION, EMULSION INTRAVENOUS CONTINUOUS
Status: DISCONTINUED | OUTPATIENT
Start: 2022-06-29 | End: 2022-07-03

## 2022-06-29 RX ORDER — MUPIROCIN 20 MG/G
OINTMENT TOPICAL 2 TIMES DAILY
Status: COMPLETED | OUTPATIENT
Start: 2022-06-29 | End: 2022-07-04

## 2022-06-29 RX ORDER — FENTANYL CITRATE 50 UG/ML
25 INJECTION, SOLUTION INTRAMUSCULAR; INTRAVENOUS
Status: DISCONTINUED | OUTPATIENT
Start: 2022-06-29 | End: 2022-06-29

## 2022-06-29 RX ORDER — PROPOFOL 10 MG/ML
VIAL (ML) INTRAVENOUS
Status: DISCONTINUED | OUTPATIENT
Start: 2022-06-29 | End: 2022-06-29

## 2022-06-29 RX ORDER — TRANEXAMIC ACID 100 MG/ML
INJECTION, SOLUTION INTRAVENOUS CONTINUOUS PRN
Status: DISCONTINUED | OUTPATIENT
Start: 2022-06-29 | End: 2022-06-29

## 2022-06-29 RX ORDER — LIDOCAINE HYDROCHLORIDE 20 MG/ML
INJECTION, SOLUTION EPIDURAL; INFILTRATION; INTRACAUDAL; PERINEURAL
Status: DISCONTINUED | OUTPATIENT
Start: 2022-06-29 | End: 2022-06-29

## 2022-06-29 RX ORDER — EPINEPHRINE 0.1 MG/ML
INJECTION INTRAVENOUS
Status: DISCONTINUED | OUTPATIENT
Start: 2022-06-29 | End: 2022-06-29

## 2022-06-29 RX ORDER — POTASSIUM CHLORIDE 14.9 MG/ML
40 INJECTION INTRAVENOUS
Status: DISCONTINUED | OUTPATIENT
Start: 2022-06-29 | End: 2022-07-04

## 2022-06-29 RX ORDER — ONDANSETRON 2 MG/ML
INJECTION INTRAMUSCULAR; INTRAVENOUS
Status: DISCONTINUED | OUTPATIENT
Start: 2022-06-29 | End: 2022-06-29

## 2022-06-29 RX ORDER — FAMOTIDINE 10 MG/ML
20 INJECTION INTRAVENOUS 2 TIMES DAILY
Status: DISCONTINUED | OUTPATIENT
Start: 2022-06-29 | End: 2022-07-04

## 2022-06-29 RX ORDER — PROTAMINE SULFATE 10 MG/ML
INJECTION, SOLUTION INTRAVENOUS
Status: DISCONTINUED | OUTPATIENT
Start: 2022-06-29 | End: 2022-06-29

## 2022-06-29 RX ORDER — KETAMINE HCL IN 0.9 % NACL 50 MG/5 ML
SYRINGE (ML) INTRAVENOUS
Status: DISCONTINUED | OUTPATIENT
Start: 2022-06-29 | End: 2022-06-29

## 2022-06-29 RX ORDER — RIFAMPIN 600 MG/10ML
INJECTION, POWDER, LYOPHILIZED, FOR SOLUTION INTRAVENOUS
Status: DISCONTINUED | OUTPATIENT
Start: 2022-06-29 | End: 2022-06-29 | Stop reason: HOSPADM

## 2022-06-29 RX ORDER — SODIUM CHLORIDE 9 MG/ML
INJECTION, SOLUTION INTRAVENOUS CONTINUOUS PRN
Status: DISCONTINUED | OUTPATIENT
Start: 2022-06-29 | End: 2022-06-29

## 2022-06-29 RX ADMIN — NOREPINEPHRINE BITARTRATE 32 MCG: 1 INJECTION, SOLUTION, CONCENTRATE INTRAVENOUS at 11:06

## 2022-06-29 RX ADMIN — NITROGLYCERIN 25 MCG: 5 INJECTION, SOLUTION INTRAVENOUS at 11:06

## 2022-06-29 RX ADMIN — EPINEPHRINE 10 MCG: 0.1 INJECTION, SOLUTION ENDOTRACHEAL; INTRACARDIAC; INTRAVENOUS at 11:06

## 2022-06-29 RX ADMIN — NITROGLYCERIN 25 MCG: 5 INJECTION, SOLUTION INTRAVENOUS at 12:06

## 2022-06-29 RX ADMIN — EPINEPHRINE 0.08 MCG/KG/MIN: 1 INJECTION INTRAMUSCULAR; INTRAVENOUS; SUBCUTANEOUS at 07:06

## 2022-06-29 RX ADMIN — FENTANYL CITRATE 100 MCG: 50 INJECTION, SOLUTION INTRAMUSCULAR; INTRAVENOUS at 08:06

## 2022-06-29 RX ADMIN — LIDOCAINE HYDROCHLORIDE 100 MG: 20 INJECTION, SOLUTION EPIDURAL; INFILTRATION; INTRACAUDAL; PERINEURAL at 11:06

## 2022-06-29 RX ADMIN — VASOPRESSIN 10 UNITS: 20 INJECTION, SOLUTION INTRAMUSCULAR; SUBCUTANEOUS at 12:06

## 2022-06-29 RX ADMIN — ONDANSETRON 1 G: 2 INJECTION INTRAMUSCULAR; INTRAVENOUS at 12:06

## 2022-06-29 RX ADMIN — EPINEPHRINE 20 MCG: 0.1 INJECTION, SOLUTION ENDOTRACHEAL; INTRACARDIAC; INTRAVENOUS at 11:06

## 2022-06-29 RX ADMIN — NOREPINEPHRINE BITARTRATE 16 MCG: 1 INJECTION, SOLUTION, CONCENTRATE INTRAVENOUS at 12:06

## 2022-06-29 RX ADMIN — DEXTROSE 500 MG: 50 INJECTION, SOLUTION INTRAVENOUS at 08:06

## 2022-06-29 RX ADMIN — VANCOMYCIN HYDROCHLORIDE 1.25 G: 1 INJECTION, POWDER, LYOPHILIZED, FOR SOLUTION INTRAVENOUS at 08:06

## 2022-06-29 RX ADMIN — VASOPRESSIN 5 UNITS: 20 INJECTION, SOLUTION INTRAMUSCULAR; SUBCUTANEOUS at 12:06

## 2022-06-29 RX ADMIN — PROPOFOL 50 MCG/KG/MIN: 10 INJECTION, EMULSION INTRAVENOUS at 09:06

## 2022-06-29 RX ADMIN — VASOPRESSIN 3 UNITS: 20 INJECTION, SOLUTION INTRAMUSCULAR; SUBCUTANEOUS at 02:06

## 2022-06-29 RX ADMIN — VASOPRESSIN 2 UNITS: 20 INJECTION, SOLUTION INTRAMUSCULAR; SUBCUTANEOUS at 12:06

## 2022-06-29 RX ADMIN — EPINEPHRINE 0.04 MCG/KG/MIN: 1 INJECTION INTRAMUSCULAR; INTRAVENOUS; SUBCUTANEOUS at 08:06

## 2022-06-29 RX ADMIN — MUPIROCIN: 20 OINTMENT TOPICAL at 08:06

## 2022-06-29 RX ADMIN — FAMOTIDINE 20 MG: 10 INJECTION, SOLUTION INTRAVENOUS at 08:06

## 2022-06-29 RX ADMIN — CEFEPIME HYDROCHLORIDE 2 G: 2 INJECTION, SOLUTION INTRAVENOUS at 05:06

## 2022-06-29 RX ADMIN — NOREPINEPHRINE BITARTRATE 16 MCG: 1 INJECTION, SOLUTION, CONCENTRATE INTRAVENOUS at 02:06

## 2022-06-29 RX ADMIN — EPINEPHRINE 10 MCG: 0.1 INJECTION, SOLUTION ENDOTRACHEAL; INTRACARDIAC; INTRAVENOUS at 12:06

## 2022-06-29 RX ADMIN — ROCURONIUM BROMIDE 20 MG: 10 INJECTION INTRAVENOUS at 12:06

## 2022-06-29 RX ADMIN — NOREPINEPHRINE BITARTRATE 16 MCG: 1 INJECTION, SOLUTION, CONCENTRATE INTRAVENOUS at 10:06

## 2022-06-29 RX ADMIN — MIDAZOLAM HYDROCHLORIDE 4 MG: 1 INJECTION, SOLUTION INTRAMUSCULAR; INTRAVENOUS at 08:06

## 2022-06-29 RX ADMIN — NOREPINEPHRINE BITARTRATE 32 MCG: 1 INJECTION, SOLUTION, CONCENTRATE INTRAVENOUS at 12:06

## 2022-06-29 RX ADMIN — EPINEPHRINE 20 MCG: 0.1 INJECTION, SOLUTION ENDOTRACHEAL; INTRACARDIAC; INTRAVENOUS at 12:06

## 2022-06-29 RX ADMIN — FUROSEMIDE 40 MG: 10 INJECTION, SOLUTION INTRAMUSCULAR; INTRAVENOUS at 01:06

## 2022-06-29 RX ADMIN — ACETAMINOPHEN 650 MG: 325 TABLET ORAL at 06:06

## 2022-06-29 RX ADMIN — SODIUM CHLORIDE 1 MG/KG/HR: 9 INJECTION, SOLUTION INTRAVENOUS at 08:06

## 2022-06-29 RX ADMIN — MAGNESIUM SULFATE 2 G: 2 INJECTION INTRAVENOUS at 12:06

## 2022-06-29 RX ADMIN — FENTANYL CITRATE 50 MCG: 50 INJECTION, SOLUTION INTRAMUSCULAR; INTRAVENOUS at 02:06

## 2022-06-29 RX ADMIN — FUROSEMIDE 40 MG: 10 INJECTION, SOLUTION INTRAMUSCULAR; INTRAVENOUS at 11:06

## 2022-06-29 RX ADMIN — LIDOCAINE HYDROCHLORIDE 100 MG: 20 INJECTION, SOLUTION EPIDURAL; INFILTRATION; INTRACAUDAL; PERINEURAL at 01:06

## 2022-06-29 RX ADMIN — NOREPINEPHRINE BITARTRATE 16 MCG: 1 INJECTION, SOLUTION, CONCENTRATE INTRAVENOUS at 11:06

## 2022-06-29 RX ADMIN — PROPOFOL 50 MCG/KG/MIN: 10 INJECTION, EMULSION INTRAVENOUS at 02:06

## 2022-06-29 RX ADMIN — HEPARIN SODIUM 5000 UNITS: 1000 INJECTION, SOLUTION INTRAVENOUS; SUBCUTANEOUS at 02:06

## 2022-06-29 RX ADMIN — TRANEXAMIC ACID 1000 MG: 100 INJECTION, SOLUTION INTRAVENOUS at 09:06

## 2022-06-29 RX ADMIN — VASOPRESSIN 3 UNITS: 20 INJECTION, SOLUTION INTRAMUSCULAR; SUBCUTANEOUS at 11:06

## 2022-06-29 RX ADMIN — Medication 20 MG: at 11:06

## 2022-06-29 RX ADMIN — ROCURONIUM BROMIDE 100 MG: 10 INJECTION INTRAVENOUS at 08:06

## 2022-06-29 RX ADMIN — CEFEPIME 2 G: 2 INJECTION, POWDER, FOR SOLUTION INTRAVENOUS at 01:06

## 2022-06-29 RX ADMIN — VASOPRESSIN 2 UNITS: 20 INJECTION, SOLUTION INTRAMUSCULAR; SUBCUTANEOUS at 11:06

## 2022-06-29 RX ADMIN — ALBUMIN (HUMAN) 25 G: 12.5 SOLUTION INTRAVENOUS at 08:06

## 2022-06-29 RX ADMIN — NOREPINEPHRINE BITARTRATE 0.02 MCG/KG/MIN: 1 INJECTION, SOLUTION, CONCENTRATE INTRAVENOUS at 09:06

## 2022-06-29 RX ADMIN — HYDROXYZINE HYDROCHLORIDE 10 MG: 10 TABLET ORAL at 03:06

## 2022-06-29 RX ADMIN — ROCURONIUM BROMIDE 30 MG: 10 INJECTION INTRAVENOUS at 11:06

## 2022-06-29 RX ADMIN — PROPOFOL 50 MCG/KG/MIN: 10 INJECTION, EMULSION INTRAVENOUS at 06:06

## 2022-06-29 RX ADMIN — PROPOFOL 20 MG: 10 INJECTION, EMULSION INTRAVENOUS at 08:06

## 2022-06-29 RX ADMIN — ONDANSETRON 1000 MG: 2 INJECTION INTRAMUSCULAR; INTRAVENOUS at 11:06

## 2022-06-29 RX ADMIN — SODIUM CHLORIDE: 0.9 INJECTION, SOLUTION INTRAVENOUS at 05:06

## 2022-06-29 RX ADMIN — HEPARIN SODIUM 45000 UNITS: 1000 INJECTION, SOLUTION INTRAVENOUS; SUBCUTANEOUS at 10:06

## 2022-06-29 RX ADMIN — ROCURONIUM BROMIDE 20 MG: 10 INJECTION INTRAVENOUS at 01:06

## 2022-06-29 RX ADMIN — VASOPRESSIN 3 UNITS: 20 INJECTION, SOLUTION INTRAMUSCULAR; SUBCUTANEOUS at 12:06

## 2022-06-29 RX ADMIN — ALBUMIN HUMAN 25 G: 50 SOLUTION INTRAVENOUS at 08:06

## 2022-06-29 RX ADMIN — PROPOFOL 30 MG: 10 INJECTION, EMULSION INTRAVENOUS at 08:06

## 2022-06-29 RX ADMIN — POTASSIUM CHLORIDE: 200 INJECTION, SOLUTION INTRAVENOUS at 12:06

## 2022-06-29 RX ADMIN — VASOPRESSIN 2 UNITS: 20 INJECTION, SOLUTION INTRAMUSCULAR; SUBCUTANEOUS at 01:06

## 2022-06-29 RX ADMIN — VASOPRESSIN 4 UNITS: 20 INJECTION, SOLUTION INTRAMUSCULAR; SUBCUTANEOUS at 02:06

## 2022-06-29 RX ADMIN — HEPARIN SODIUM 5000 UNITS: 1000 INJECTION, SOLUTION INTRAVENOUS; SUBCUTANEOUS at 10:06

## 2022-06-29 RX ADMIN — FENTANYL CITRATE 25 MCG: 50 INJECTION INTRAMUSCULAR; INTRAVENOUS at 05:06

## 2022-06-29 RX ADMIN — NOREPINEPHRINE BITARTRATE 8 MCG: 1 INJECTION, SOLUTION, CONCENTRATE INTRAVENOUS at 10:06

## 2022-06-29 RX ADMIN — LIDOCAINE HYDROCHLORIDE 100 MG: 20 INJECTION, SOLUTION EPIDURAL; INFILTRATION; INTRACAUDAL; PERINEURAL at 08:06

## 2022-06-29 RX ADMIN — HYDROCODONE BITARTRATE AND ACETAMINOPHEN 1 TABLET: 5; 325 TABLET ORAL at 06:06

## 2022-06-29 RX ADMIN — ANGIOTENSIN II 20 NG/KG/MIN: 2.5 INJECTION INTRAVENOUS at 12:06

## 2022-06-29 RX ADMIN — VASOPRESSIN 0.04 UNITS/MIN: 20 INJECTION INTRAVENOUS at 08:06

## 2022-06-29 RX ADMIN — Medication 618 UNITS: at 10:06

## 2022-06-29 RX ADMIN — Medication 30 MG: at 09:06

## 2022-06-29 RX ADMIN — ROCURONIUM BROMIDE 30 MG: 10 INJECTION INTRAVENOUS at 09:06

## 2022-06-29 RX ADMIN — ETOMIDATE 10 MG: 2 INJECTION, SOLUTION INTRAVENOUS at 08:06

## 2022-06-29 RX ADMIN — FENTANYL CITRATE 50 MCG: 50 INJECTION, SOLUTION INTRAMUSCULAR; INTRAVENOUS at 11:06

## 2022-06-29 RX ADMIN — PROTAMINE SULFATE 250 MG: 10 INJECTION, SOLUTION INTRAVENOUS at 11:06

## 2022-06-29 RX ADMIN — CEFEPIME 2 G: 2 INJECTION, POWDER, FOR SOLUTION INTRAVENOUS at 09:06

## 2022-06-29 RX ADMIN — NICARDIPINE HYDROCHLORIDE 2.5 MG/HR: 0.2 INJECTION, SOLUTION INTRAVENOUS at 03:06

## 2022-06-29 RX ADMIN — INSULIN ASPART 2 UNITS: 100 INJECTION, SOLUTION INTRAVENOUS; SUBCUTANEOUS at 07:06

## 2022-06-29 RX ADMIN — SODIUM CHLORIDE, SODIUM GLUCONATE, SODIUM ACETATE, POTASSIUM CHLORIDE, MAGNESIUM CHLORIDE, SODIUM PHOSPHATE, DIBASIC, AND POTASSIUM PHOSPHATE: .53; .5; .37; .037; .03; .012; .00082 INJECTION, SOLUTION INTRAVENOUS at 08:06

## 2022-06-29 RX ADMIN — NICARDIPINE HYDROCHLORIDE 2.5 MG/HR: 0.2 INJECTION INTRAVENOUS at 03:06

## 2022-06-29 NOTE — NURSING
Blood glucose 339. Correctional dose given. Recheck remains at 339. Called Dr. Sanderson to update. Called overnight endocrinologist, Dr. Shearer. Additional insulin ordered.

## 2022-06-29 NOTE — PROGRESS NOTES
Pt out of Surgery.    Cardiac device reprogramming for ICD    ICD detection and tachy therapies enabled at bedside.    Pt's nurse notified of changes

## 2022-06-29 NOTE — PROGRESS NOTES
06/29/2022  Perfusionist:  Romain Dyer CCP, LP  Surgeon(s) and Role:     * Luis F Paige MD - Primary     * Michael Coronado MD - Resident - Assisting  Anesthesiologist:  Josiah Currie MD  Primary Perfusionist:: Romain Dyer CCP, LP    Past Medical History:   Diagnosis Date    Arthritis     Cardiomyopathy     CHF (congestive heart failure) 10/01/2020    Diabetes mellitus     Dilated cardiomyopathy 10/26/2020    Drug abuse 10/2020    Hyperosmolar hyperglycemic state (HHS) 5/25/2022    ICD (implantable cardioverter-defibrillator) in place 10/26/2020    Renal disorder        For implantable VADs  Pump assembled and wet-tested by:ROMAIN DYER CCP, LP  Pump SN: [id number]  Expiration date: 3-  Primary controller: [id number]  Backup controller: [id number]    For Heartmate3 devices: The VAD was started in extended alarm silence at 3000 RPMs per  recommendation.  Initially at 3000 RPMs, the PI was 2.1. and the power was 3.0 while flow did not register.  Note the VAD was started while the patient was still on full cardiopulmonary bypass.  As the bypass machine was weaned by the primary perfusionist, the VAD speed was changed multiple times by Romain Dyer under the direction of Surgeon(s) and Role:     * Luis F Paige MD - Primary     * Michael Coronado MD - Resident - Assisting and Josiah Currie MD utilizing GUILLERMO guidance to assess LV and/or RV function, as well as ventricular filling/emptying.  Those changes occurred rapidly over the first few minutes of weaning from bypass and conversion to full VAD support, and due to the extremely fluid nature of this process, those rapid changes are not documented here.  The initial VAD parameters once stable are listed in the table below:    Initial VAD parameters at OR exit:  Fixed speed (RPMs) Low speed (RPMs) Flow (LPMs) PI Power (w) Extended alarm silence cancelled?   5000 4600 3.4 3.3 5.8 [x]       At the end of  the procedure, the current device settings were verified to be accurate.  The patient was transported post operatively with back-up/emergency equipment that was delivered to the patient's room and verified by the bedside nurse, and report was given.  I was available by phone for management and troubleshooting concerns by the unit staff.  There were no issues.    2:43 PM

## 2022-06-29 NOTE — ASSESSMENT & PLAN NOTE
Endocrinology consulted for BG management.   BG goal 140-180      Continue IV insulin infusion protocol  Requires intensive BG monitoring while on protocol     ** Please notify Endocrine for any change and/or advance in diet**  ** Please call Endocrine for any BG related issues **    Discharge Planning:   TBD. Please notify endocrinology prior to discharge.

## 2022-06-29 NOTE — PLAN OF CARE
Pt remains alert and oriented x4. Complaints of headache, scheduled pain medication given and one time dose of hydroxine ordered. Dr. Sanderson aware. Pupils are equal, round, and reactive. Afebrile. Sinus tachy on monitor. IABP continues 1:1 with EKG trigger. Pulses palpable.  Normotensive.  Dobutamine and epi infusions continue. Additional lasix push given. Lasix infusion continues. Marcella at 20ppm with 4L via nasal cannula. Blood glucose elevated overnight. Called endo. Additional insulin given per order. Glucose level improved. NPO since 0000 for VAD today. Vitamin K given and ordered. Plan to turn off heparin at OR call per CTS.     Problem: Adult Inpatient Plan of Care  Goal: Plan of Care Review  Outcome: Ongoing, Progressing  Goal: Absence of Hospital-Acquired Illness or Injury  Outcome: Ongoing, Progressing  Goal: Optimal Comfort and Wellbeing  Outcome: Ongoing, Progressing     Problem: Diabetes Comorbidity  Goal: Blood Glucose Level Within Targeted Range  Outcome: Ongoing, Progressing     Problem: Skin Injury Risk Increased  Goal: Skin Health and Integrity  Outcome: Ongoing, Progressing     Problem: Cardiac Output Decreased (Heart Failure)  Goal: Optimal Cardiac Output  Outcome: Ongoing, Progressing     Problem: Fluid Imbalance (Heart Failure)  Goal: Fluid Balance  Outcome: Ongoing, Progressing

## 2022-06-29 NOTE — CARE UPDATE
Transplant Update Note    Tachytherapies turned off as patient is scheduled for OR shortly for LVAD placement.    Media Information        File Link    Scan on 6/29/2022  6:09 AM by Rebel Sanderson MD: Electrocautery protection enabled; device changed from VVI back up 40 bpm to VOO back up 60 bpm        Key Information    Document ID File Type Document Type Description   [id number] Image Photographs Electrocautery protection enabled; device changed from VVI back up 40 bpm to VOO back up 60 bpm       Import Information    Attached At Date Time User Dept   Encounter Level 6/29/2022  6:09 AM Rebel Sanderson MD [facility] Cardiac Icu       Encounter    Hospital Encounter on 6/13/22       Media Audit Information    Cardiac Device Report was moved from this patient by Alphonso Gonzalez on 5/25/2022 at 8:58 AM (DCS ID: [id number], File: [id number])       Patient will need to be changed back from VOO 60 bpm to VVI 40 bpm with re-enabling off tachycatherapies post VAD. Please call device clinic [phone] to change settings back post procedure.    Please call with questions or concerns.     Rebel Sanderson MD  Heart Transplant PGY4  [facility]

## 2022-06-29 NOTE — RESPIRATORY THERAPY
Received patient from the OR. Placed on ventilator with documented setting. ABG done. Ambu and mask at bedside. Will continue to monitor.

## 2022-06-29 NOTE — HPI
Mr. Kevan Queen is a 37 year old male with a medical histroy significnat for NICM and former polysubstance abuse. He is followed by the heart failure team.  He arrived for a RHC and was admitted for evaluation of advanced options for heart failure.  CTS is being consulted for surgical portion of the advanced options evaluation.  LVIDD 7, TASPE 1.38, blood group A+.  CT scan reviewed and does not preclude patient from advanced options.  Okay to continue eval for LVADA/OHT but given favorable blood group, would continue consider OHT.    The patient was taken to the OR for LVAD, RVAD, MVr on 6/29/22. The case was complicated by right ventricular failure upon coming off of pump-- interventional cardiology was consulted intra-operatively for RVAD insertion. Intra-operatively, the patient received 3L crystalloid, 2u autologous. He was given 40mg IV lasix and remained on a lasix gtt throughout the case, resulting in 2L UOP intra-operatively. Post-operative GUILLERMO demonstrated better RV and LV function with assist devices in place. He arrives to the unit intubated, sedated with an open chest on 0.08 epi, 2.5 dobutamine. Chest tubes in place with sanguinous output. He is admitted to the SICU for post-operative care.

## 2022-06-29 NOTE — ANESTHESIA PREPROCEDURE EVALUATION
[facility]  Anesthesia Pre-Operative Evaluation         Patient Name/: Kevan Queen, 1985  MRN: [id number]    SUBJECTIVE:     Pre-operative evaluation for Procedure(s) (LRB):  CLOSURE, WOUND, STERNUM (N/A)  INSERTION-RIGHT VENTRICULAR ASSIST DEVICE (Right)     2022    Kevan Queen is a 37 y.o. male w/ a significant PMHx of former polysubstance abuse, CKD 3, uncontrolled T2DM (recently admitted for [facility]), anxiety, NICM with BiV systolic heart failure, on home Milrinone at 0.375 mcg/kg/min who presented with ADHF and required IABP support. Now s/p LVAD 22..    Patient now presents for the above procedure(s).      Prev airway:    Induction:  Intravenous    Intubated:  Postinduction    Mask Ventilation:  Easy with oral airway    Attempts:  1    Attempted By:  Staff anesthesiologist    Method of Intubation:  Direct    Blade:  Mehul 3    Laryngeal View Grade: Grade I - full view of cords      Difficult Airway Encountered?: No      Complications:  None    Airway Device:  Oral endotracheal tube    Airway Device Size:  7.5    Style/Cuff Inflation:  Cuffed (inflated to minimal occlusive pressure)    Inflation Amount (mL):  8    Tube secured:  22    Secured at:  The teeth    Placement Verified By:  Capnometry    Complicating Factors:  None    LDA:  Introducer 22 1123 (Active)   Number of days: 0        Introducer with Double Lumen 22 1123 (Active)   Number of days: 0       Percutaneous Central Line Insertion/Assessment - Triple Lumen  22 right internal jugular (Active)   Line Necessity Review Hemodynamic instability 22 1902   $ Central Line Charges (Upon insertion) Bedside Insertion Performed Pt > 5 Years Old;Catheter - Triple Lumen (Supply) 22 220   Verification by X-ray Yes 22 190   Site Assessment No drainage;No redness;No swelling;No warmth 22 0301   Line Securement Device Secured with sutures 22 190   Dressing Type Biopatch  in place;Central line dressing 06/29/22 0301   Dressing Status Clean;Dry;Intact 06/29/22 0301   Dressing Intervention Integrity maintained 06/29/22 0301   Date on Dressing 06/25/22 06/28/22 1901   Dressing Due to be Changed 07/02/22 06/28/22 1901   Distal Patency/Care infusing 06/29/22 0301   Medial 1 Patency/Care infusing 06/29/22 0301   Proximal 1 Patency/Care infusing 06/29/22 0301   Waveform Normal 06/29/22 0301   Number of days: 5            Peripheral IV - Single Lumen 06/28/22 1900 20 G Anterior;Proximal;Right Forearm (Active)   Site Assessment Clean;Dry;Intact 06/29/22 0301   Extremity Assessment Distal to IV No abnormal discoloration;No redness;No swelling;No warmth 06/29/22 0301   Line Status Infusing 06/29/22 0301   Dressing Status Clean;Dry;Intact 06/29/22 0301   Dressing Intervention Integrity maintained 06/29/22 0301   Dressing Change Due 07/02/22 06/29/22 0301   Site Change Due 07/02/22 06/28/22 1901   Reason Not Rotated Not due 06/29/22 0301   Number of days: 0       Arterial Line 06/29/22 0832 Left Other (Comment) (Active)   Number of days: 0            IABP 06/23/22 0950 8.0 Fr. 40 mL (Active)   $ IABP Charges (Upon insertion) IABP Bedside Insertion Performed 06/23/22 0950   Site Assessment Dry;Intact;No redness;No swelling 06/29/22 0800   Helium Tubing Clear 06/29/22 0800   Arterial Line Status Arterial fluids per protocol 06/29/22 0800   Arterial Line Interventions Zeroed and calibrated;Leveled 06/29/22 0800   Positioning HOB up 15 degrees 06/29/22 0800   Dressing Occlusive 06/29/22 0800   Dressing Status Clean;Dry;Intact 06/29/22 0800   Dressing Intervention Integrity maintained 06/29/22 0800   Dressing Change Due 06/30/22 06/29/22 0800   Color/Movement/Sensation Capillary refill less than 3 sec 06/29/22 0800   Interventions X-ray 06/25/22 0701   Number of days: 6            VAD 06/29/22 1115 Left ventricular assist device HeartMate 3 (Active)   Dressing Status Clean;Dry;Intact 06/29/22 0785    Number of days: 0            VAD 06/29/22 1415 Right ventricular assist device (Active)   Number of days: 0            Chest Tube 06/29/22 1425 1 Left 32 Fr. (Active)   Output (mL) 10 mL 06/29/22 1540   Number of days: 0            Chest Tube 06/29/22 1425 2 Right Mediastinal 32 Fr. (Active)   Output (mL) 0 mL 06/29/22 1540   Number of days: 0            Urethral Catheter 06/29/22 0840 Straight-tip;Temperature probe;Non-latex 14 Fr. (Active)   Output (mL) 100 mL 06/29/22 1540   Number of days: 0       Drips:    sodium chloride 0.9% 5 mL/hr at 06/27/22 0055    DOBUTamine IV infusion (non-titrating) 2.5 mcg/kg/min (06/29/22 1202)    EPINEPHrine 0.04 mcg/kg/min (06/29/22 0800)    furosemide (LASIX) 10 mg/mL infusion (non-titrating) 20 mg/hr (06/29/22 1314)    insulin regular 1 units/mL infusion orderable (DKA)      nitric oxide gas      propofoL         Patient Active Problem List   Diagnosis    Acute on chronic combined systolic and diastolic heart failure, NYHA class 4    Anxiety disorder, unspecified    NICM (nonischemic cardiomyopathy)    Anemia of chronic disease    Ecstasy abuse    Cannabis use disorder, severe, dependence    Congestive heart failure    Chronic combined systolic and diastolic congestive heart failure    Tobacco use    Cardiogenic shock    CKD (chronic kidney disease) stage 3, GFR 30-59 ml/min    Uncontrolled diabetes mellitus    Insomnia    Left shoulder pain    SOB (shortness of breath)    Tachycardia    Muscle cramping    Palliative care encounter    Goals of care, counseling/discussion    Advanced care planning/counseling discussion    Staphylococcus epidermidis bacteremia    Dilated cardiomyopathy    Surgical wound present       Review of patient's allergies indicates:   Allergen Reactions    Aspirin Other (See Comments)     Mr. Thacker is enrolled in Dr. Paige's Alon Trial and cannot have any aspirin and/or aspirin-containing products. DO NOT cancel any  orders for INV Aspirin 100 mg/Placebo. If you have questions, please contact Isabel @ [phone], [phone],[email or web address], secure chat or MS Teams message.    Bumex [bumetanide] Hives    Lactose Diarrhea     Other reaction(s): Abdominal distension, gaseous    Torsemide Hives       Current Inpatient Medications:    albumin human 5%        ceFEPime (MAXIPIME) IVPB  2 g Intravenous Q12H    famotidine (PF)  20 mg Intravenous BID    niCARdipine        propofoL           No current facility-administered medications on file prior to encounter.     Current Outpatient Medications on File Prior to Encounter   Medication Sig Dispense Refill    busPIRone (BUSPAR) 7.5 MG tablet Take 7.5 mg by mouth every 12 (twelve) hours.      EScitalopram oxalate (LEXAPRO) 5 MG Tab Take 1 tablet (5 mg total) by mouth once daily. 90 tablet 3    furosemide (LASIX) 80 MG tablet Take 80 mg by mouth 2 (two) times daily.      insulin aspart U-100 (NOVOLOG) 100 unit/mL (3 mL) InPn pen Inject 12 Units into the skin 3 (three) times daily. 30 mL 3    insulin detemir U-100 (LEVEMIR FLEXTOUCH) 100 unit/mL (3 mL) SubQ InPn pen Inject 23 Units into the skin once daily. 18 mL 3    mirtazapine (REMERON) 15 MG tablet Take 15 mg by mouth nightly.      pantoprazole (PROTONIX) 40 MG tablet Take 1 tablet (40 mg total) by mouth once daily. 30 tablet 11    potassium chloride SA (K-DUR,KLOR-CON) 20 MEQ tablet Take 2 tablets (40 mEq total) by mouth once daily. 60 tablet 11    sucralfate (CARAFATE) 1 gram tablet Take 1 g by mouth nightly.      albuterol (PROVENTIL/VENTOLIN HFA) 90 mcg/actuation inhaler INHALE 2 PUFFS BY MOUTH EVERY 4 HOURS AS NEEDED FOR COUGH OR WHEEZING      blood sugar diagnostic Strp Use to test blood glucose 4 (four) times daily. 200 each 3    blood-glucose meter Misc Use as instructed 1 each 0    lancets 33 gauge Misc Use to test blood glucose 4 (four) times daily. 200 each 3    milrinone (PRIMACOR) 1 mg/mL injection  "Inject into the vein.      milrinone 20mg/100ml D5W, 200mcg/ml, (PRIMACOR) 20 mg/100 mL (200 mcg/mL) infusion Inject 34.875 mcg/min into the vein continuous.      pen needle, diabetic 31 gauge x 1/4" Ndle 1 Device by Misc.(Non-Drug; Combo Route) route 4 (four) times daily. 180 each 3    promethazine (PHENERGAN) 12.5 MG Tab Take 1 tablet (12.5 mg total) by mouth every 6 (six) hours as needed (nausea). 30 tablet 3    traMADoL (ULTRAM) 50 mg tablet Take 1 tablet (50 mg total) by mouth every 6 (six) hours as needed for Pain. 28 tablet 0    [DISCONTINUED] digoxin (LANOXIN) 125 mcg tablet Take 1 tablet (0.125 mg total) by mouth once daily. 30 tablet 11    [DISCONTINUED] insulin (LANTUS SOLOSTAR U-100 INSULIN) glargine 100 units/mL (3mL) SubQ pen Inject 10 Units into the skin every evening. (Patient not taking: Reported on 5/24/2022) 3 mL 11    [DISCONTINUED] metOLazone (ZAROXOLYN) 2.5 MG tablet Take 2.5 mg by mouth every other day.      [DISCONTINUED] metoprolol succinate (TOPROL-XL) 25 MG 24 hr tablet TAKE 1 TABLET(25 MG) BY MOUTH EVERY DAY 30 tablet 0    [DISCONTINUED] spironolactone (ALDACTONE) 25 MG tablet Take 1 tablet (25 mg total) by mouth once daily. 30 tablet 11       Past Surgical History:   Procedure Laterality Date    CARDIAC DEFIBRILLATOR PLACEMENT      LEFT VENTRICULAR ASSIST DEVICE Left 6/23/2022    Procedure: INSERTION-LEFT VENTRICULAR ASSIST DEVICE;  Surgeon: Luis F Paige MD;  Location: [facility] OR [address] [place] [address];  Service: Cardiovascular;  Laterality: Left;    RIGHT HEART CATHETERIZATION Right 4/8/2022    Procedure: INSERTION, CATHETER, RIGHT HEART;  Surgeon: Luca Lopez Jr., MD;  Location: [facility] CATH LAB;  Service: Cardiology;  Laterality: Right;    RIGHT HEART CATHETERIZATION Right 4/19/2022    Procedure: INSERTION, CATHETER, RIGHT HEART;  Surgeon: Josh Pulido MD;  Location: [facility] CATH LAB;  Service: Cardiology;  Laterality: Right;       Social History:  Tobacco Use: Medium Risk "    Smoking Tobacco Use: Former Smoker    Smokeless Tobacco Use: Never Used       Alcohol Use: Not on file       OBJECTIVE:     Vital Signs Range:  BMI Readings from Last 1 Encounters:   06/29/22 25.82 kg/m²       Temp:  [36.7 °C (98.1 °F)-36.9 °C (98.4 °F)]   Pulse:  [108-118]   Resp:  [17-28]   BP: (109-149)/(61-79)   SpO2:  [96 %-100 %]        Significant Labs:        Component Value Date/Time    WBC 13.96 (H) 06/29/2022 0214    HGB 9.6 (L) 06/29/2022 0214    HCT 26 (L) 06/29/2022 1539     06/29/2022 0214     (L) 06/29/2022 0214    K 3.8 06/29/2022 0214    CL 91 (L) 06/29/2022 0214    CO2 28 06/29/2022 0214     (H) 06/29/2022 0214    BUN 51 (H) 06/29/2022 0214    CREATININE 2.0 (H) 06/29/2022 0214    MG 2.5 06/28/2022 0329    PHOS 5.6 (H) 06/17/2022 0332    CALCIUM 10.0 06/29/2022 0214    ALBUMIN 3.5 06/29/2022 0214    PROT 8.2 06/29/2022 0214    ALKPHOS 154 (H) 06/29/2022 0214    BILITOT 0.8 06/29/2022 0214    AST 32 06/29/2022 0214    ALT 27 06/29/2022 0214    INR 3.0 (H) 06/29/2022 1032    HGBA1C 9.2 (H) 05/20/2022 1203    HGBA1C 6.2 (H) 04/18/2022 1407        Please see Results Review for additional labs.     Diagnostic Studies: No relevant studies.    EKG:   Results for orders placed or performed during the hospital encounter of 06/13/22   EKG 12-lead    Collection Time: 06/21/22 11:50 AM    Narrative    Test Reason : I50.9,    Vent. Rate : 117 BPM     Atrial Rate : 117 BPM     P-R Int : 126 ms          QRS Dur : 086 ms      QT Int : 342 ms       P-R-T Axes : 061 -48 029 degrees     QTc Int : 477 ms    Sinus tachycardia  Biatrial enlargement  Left axis deviation  Nonspecific ST and T wave abnormality  Abnormal ECG  When compared with ECG of 16-JUN-2022 03:35,  The axis Shifted left  ST now depressed in Inferior leads  Nonspecific T wave abnormality no longer evident in Inferior leads  T wave inversion now evident in Lateral leads  Confirmed by TRAVIS HILL MD ([id number]) on 6/21/2022  1:40:14 PM    Referred By: DAVE   SELF           Confirmed By:TRAVIS HILL MD       ECHO:  Results for orders placed during the hospital encounter of 06/13/22    Echo    Interpretation Summary  · The left ventricle is severely enlarged with mild eccentric hypertrophy and severely decreased systolic function.  · The estimated ejection fraction is 18%.  · There is severe left ventricular global hypokinesis.  · Grade III left ventricular diastolic dysfunction.  · Severe left atrial enlargement.  · Moderate right ventricular enlargement with moderately reduced right ventricular systolic function.  · Severe right atrial enlargement.  · Moderate-to-severe mitral regurgitation.  · Moderate tricuspid regurgitation.  · Elevated central venous pressure (15 mmHg).  · The estimated PA systolic pressure is 74 mmHg.  · There is severe pulmonary hypertension.        ASSESSMENT/PLAN:                                                                                                             Pre-op Assessment    I have reviewed the Patient Summary Reports.     I have reviewed the Nursing Notes.    I have reviewed the Medications.     Review of Systems  Anesthesia Hx:   Denies Personal Hx of Anesthesia complications.   Social:  Former Smoker    Cardiovascular:   Pacemaker CHF    Pulmonary:   Shortness of breath    Renal/:   Chronic Renal Disease, CRI    Endocrine:   Diabetes, poorly controlled, type 2    Psych:   Psychiatric History          Physical Exam  General: Intubated, sedated   Airway:  Pre-Existing Airway: Oral Endotracheal tube    Dental:  Intact    Chest/Lungs:  Clear to auscultation    Heart:  Rate: Normal  LVAD HUM      Anesthesia Plan  Type of Anesthesia, risks & benefits discussed:    Anesthesia Type: Gen ETT  Intra-op Monitoring Plan: Standard ASA Monitors  Post Op Pain Control Plan: multimodal analgesia and IV/PO Opioids PRN  Induction:  IV  Airway Plan: Direct, Post-Induction  Informed Consent: Informed  consent signed with the Patient and all parties understand the risks and agree with anesthesia plan.  All questions answered.   ASA Score: 4  Day of Surgery Review of History & Physical: H&P Update referred to the surgeon/provider.    Ready For Surgery From Anesthesia Perspective.     .

## 2022-06-29 NOTE — ANESTHESIA PROCEDURE NOTES
GUILLERMO    Diagnosis: Acute on chronic combined systolic and diastolic heart failure, NYHA class 4 [I50.43]  Patient location during procedure: OR  Exam type: Baseline    Staffing  Performed: anesthesiologist     Anesthesiologist: Josiah Currie MD        Anesthesiologist Present  Yes      Setup & Induction  Patient preparation: bite block inserted  Probe Insertion: easy  Exam: completeDoppler Echo: 2D, color flow mapping, pulse wave Doppler and continuous wave Doppler.  Exam     Left Heart  Left Atruim: dilated    Left Ventricle: cm, severely abnormal (men >/= 6.8; women>/= 6.1)    LVAD:no  Estimated Ejection Fraction: < 25% severe            Right Heart  Right Ventricle: dilated  Right Ventricle Function: moderately decreased    Intra Atrial Septum  PFO: no shunt by color flow doppler  no IAS aneurysm  no lipomatous hypertrophy  no Atrial Septal Defect (Asd)    Right Ventricle  Size: dilated    Aortic Valve:  Morphology: trileaflet    Regurgitation: Trace      Mitral Valve:   Jet Description: severe    Tricuspid Valve:  Regurgitation: severe    Pulmonic Valve:  Regurgitation(color flow): mild    Great Vessels  IABP: yes  Aorta    Descending aorta IABP: yes    Effusions none    SummaryFindings discussed with surgeon.    Other Findings   Severely decreased LV systolic function  Moderately decreased RV systolic function with a severely dilated RV  Severe mitral regurgitation due to restricted leaflet movement and dilated mitral annulus  Severe Tricuspid regurgitation  IABP present approx 4cm distal to the left subclavian artery  No significant effusions  No PFO

## 2022-06-29 NOTE — PROGRESS NOTES
"[facility] - Surgical Intensive Care  Endocrinology  Progress Note    Admit Date: 6/13/2022     Reason for Consult: Management of  type 2 DM, Hyperglycemia     Surgical Procedure and Date: none    Diabetes diagnosis year: 4/21/21    Home Diabetes Medications:  Detemir  23  units daily and Aspart   12  units TIDWM and  Mod dose correction insulin    Lab Results   Component Value Date    HGBA1C 9.2 (H) 05/20/2022           How often checking glucose at home? 1-3 x day   BG readings on regimen: 180- up to 300 once  Hypoglycemia on the regimen?  yes once- related to not really eating after taking aspart   Missed doses on regimen?  no    Diabetes Complications include:     Hyperglycemia    Complicating diabetes co morbidities:   History of MI, CHF, and CKD      HPI:   Patient is a 37 y.o. male with a diagnosis of type 2 DM and NIDCM with BiV systolic heart failure. Patient was presented at [facility] 4/2/22  and was  approved for VAD. Also recently admitted with [facility]; he had clinic appointment last week to f/u recent admit for hyperglycemic hyperosmolar syndrome but did not come as he was "feeling too bad" presents to  ED with SOB. Endocrinology consulted for BG/ DM management.                Interval HPI:   Overnight events: NAEON. OR today for LVAD. Received in SICU post-operatively. Intubated. BG at or above goal; insulin rate up to 6 u/hr. Day of Surgery  Eating: Diet NPO  Nausea: No  Hypoglycemia and intervention: No  Fever: No  TPN and/or TF: No      /61   Pulse 109   Temp 98.4 °F (36.9 °C)   Resp 18   Ht 6' 3" (1.905 m)   Wt 93.7 kg (206 lb 9.1 oz)   SpO2 99%   BMI 25.82 kg/m²     Labs Reviewed and Include    Recent Labs   Lab 06/29/22  0214   *   CALCIUM 10.0   ALBUMIN 3.5   PROT 8.2   *   K 3.8   CO2 28   CL 91*   BUN 51*   CREATININE 2.0*   ALKPHOS 154*   ALT 27   AST 32   BILITOT 0.8     Lab Results   Component Value Date    WBC 13.96 (H) 06/29/2022    HGB 9.6 (L) 06/29/2022    HCT 26 (L) " 06/29/2022    MCV 89 06/29/2022     06/29/2022     No results for input(s): TSH, FREET4 in the last 168 hours.  Lab Results   Component Value Date    HGBA1C 9.2 (H) 05/20/2022       Nutritional status:   Body mass index is 25.82 kg/m².  Lab Results   Component Value Date    ALBUMIN 3.5 06/29/2022    ALBUMIN 3.5 06/28/2022    ALBUMIN 3.6 06/27/2022     Lab Results   Component Value Date    PREALBUMIN 36 04/18/2022       Estimated Creatinine Clearance: 60.4 mL/min (A) (based on SCr of 2 mg/dL (H)).    Accu-Checks  Recent Labs     06/28/22  1204 06/28/22  1801 06/28/22  1940 06/28/22  1942 06/28/22 2021 06/28/22  2134 06/28/22  2310 06/28/22  2352 06/29/22  0403 06/29/22  0754   POCTGLUCOSE 175* 186* 339* 329* 339* 293* 448* 194* 211* 181*       Current Medications and/or Treatments Impacting Glycemic Control  Immunotherapy:    Immunosuppressants       None          Steroids:   Hormones (From admission, onward)                Start     Stop Route Frequency Ordered    06/29/22 1206  angiotensin II (GIAPREZA) 2,500,000 ng in sodium chloride 0.9% 250 mL infusion        Question Answer Comment   Begin at (in ng/kg/min) 20    Titrate by: (in ng/kg/min) 5 ng/kg/min    Titrate interval: (in minutes) 5 minutes    Titrate to maintain: (MAP or SBP) MAP    Greater than: (in mmHg) 80    Maximum dose: (in ng/kg/min) 80    Name of Attending Provider giving approval: Josiah Currie    Is the patient currently receiving norepinephrine >0.2 mcg/kg/min (or equivalent) AND vasopressin 0.04 units/min? Yes    Angiotensin II Infusion Adjustment (DO NOT MODIFY ANSWER) [email or web address]\Angiotensin II Titration [email or web address]        -- IV Continuous 06/29/22 1206          Pressors:    Autonomic Drugs (From admission, onward)                Start     Stop Route Frequency Ordered    06/13/22 2130  EPINEPHrine (ADRENALIN) 5 mg in dextrose 5 % 250 mL infusion         -- IV Continuous 06/13/22 2024          Hyperglycemia/Diabetes  Medications:   Antihyperglycemics (From admission, onward)                Start     Stop Route Frequency Ordered    06/27/22 1230  insulin regular in 0.9 % NaCl 100 unit/100 mL (1 unit/mL) infusion        Question:  Enter initial dose (Units/hr):  Answer:  1.5    -- IV Continuous 06/27/22 1227    06/26/22 1645  insulin aspart U-100 pen 8-12 Units         -- SubQ 3 times daily with meals 06/26/22 1154    06/25/22 1127  insulin aspart U-100 pen 0-10 Units         -- SubQ As needed (PRN) 06/25/22 1028            ASSESSMENT and PLAN    * Acute on chronic combined systolic and diastolic heart failure, NYHA class 4  Optimize glucose control and  avoid hypoglycemia    Managed per primary.       Uncontrolled diabetes mellitus  Endocrinology consulted for BG management.   BG goal 140-180      Continue IV insulin infusion protocol  Requires intensive BG monitoring while on protocol     ** Please notify Endocrine for any change and/or advance in diet**  ** Please call Endocrine for any BG related issues **    Discharge Planning:   TBD. Please notify endocrinology prior to discharge.        Surgical wound present  Optimize BG for surgical wound healing.         CKD (chronic kidney disease) stage 3, GFR 30-59 ml/min    Titrate insulin slowly to avoid hypoglycemia as the risk of hypoglycemia increases with decreased creatinine clearance.    Estimated Creatinine Clearance: 60.4 mL/min (A) (based on SCr of 2 mg/dL (H)).          Yumiko Fowler NP  Endocrinology  [facility] - Surgical Intensive Care

## 2022-06-29 NOTE — ANESTHESIA PROCEDURE NOTES
Arterial    Diagnosis: Heart failure    Patient location during procedure: done in OR  Procedure start time: 6/29/2022 8:32 AM  Timeout: 6/29/2022 8:32 AM  Procedure end time: 6/29/2022 8:37 AM    Staffing  Authorizing Provider: Josiah Currie MD  Performing Provider: Young Waller MD    Anesthesiologist was present at the time of the procedure.    Preanesthetic Checklist  Completed: patient identified, IV checked, site marked, risks and benefits discussed, surgical consent, monitors and equipment checked, pre-op evaluation, timeout performed and anesthesia consent givenArterial  Skin Prep: chlorhexidine gluconate  Local Infiltration: none  Orientation: left  Location: brachial    Catheter Size: 20 G  Catheter placement by Ultrasound guidance. Heme positive aspiration all ports.   Vessel Caliber: medium, patent, compressibility normal  Needle advanced into vessel with real time Ultrasound guidance.  Sterile sheath used.Insertion Attempts: 1  Assessment  Dressing: secured with tape and tegaderm and sutured in place and taped  Patient: Tolerated well

## 2022-06-29 NOTE — NURSING
Recheck glucose, 293. Pt wants to eat snack as he'll be NPO after midnight. Dr. Shearer ordered to give 5units of insulin with meal. Insulin delivered and patient currently eating

## 2022-06-29 NOTE — PROGRESS NOTES
Pharmacokinetic Assessment Follow Up: IV Vancomycin    Vancomycin serum concentration assessment(s):    The random level was drawn correctly and can be used to guide therapy at this time. The measurement is within the desired definitive target range of 15 to 20 mcg/mL.   - The random level was drawn 19 hours after previous dose and resulted at 15.9.    Vancomycin Regimen Plan:    Will re-dose once now with vanc 1250 mg once.  - Re-dose when the random level is less than 20 mcg/mL, next level to be drawn at 2100 on 6/29.  - Scr 2.1. Baseline ~1.5-1.7. Will pulse dose for now in the setting of elevated Scr.     Drug levels (last 3 results):  Recent Labs   Lab Result Units 06/26/22  0820 06/27/22  2258 06/28/22  1803   Vancomycin, Random ug/mL  --   --  15.9   Vancomycin-Trough ug/mL 17.7 21.3  --        Pharmacy will continue to follow and monitor vancomycin.    Please contact pharmacy at extension [phone] for questions regarding this assessment.    Thank you for the consult,   Suzan Fischer       Patient brief summary:  Kevan Queen is a 37 y.o. male initiated on antimicrobial therapy with IV Vancomycin for treatment of bacteremia    The patient's current regimen is pulse dosing in the setting of elevated Scr.     Actual Body Weight:   93.4 kg    Renal Function:   Estimated Creatinine Clearance: 57.6 mL/min (A) (based on SCr of 2.1 mg/dL (H)).     Dialysis Method (if applicable):  N/A

## 2022-06-29 NOTE — ASSESSMENT & PLAN NOTE
Kevan Queen is a 37yoM with a history of polysubstance abuse who presented to the hospital with decompensated heart failure. He underwent LVAD, RVAD and MVr on 6/29/22. He is admitted to the ICU post-operatively.       Neuro/Psych:   -- Sedation: Propofol.   -- wean sedation for neurologic exam  -- Pain: PRN fentanyl             Cards:   -- s/p LVAD, RVAD insertion and MVr   -- arrives on epi 0.08, dobutamine 2.5 for vasopressor support  -- OPEN CHEST   -- MAP goal 75-85  -- Cardene for hypertension      Pulm:   -- Goal O2 sat > 90%  -- ABG Q4hr  -- keep intubated  -- weaned to minimal ventilatory support  -- iNO2 @ 10ppm  -- daily CXR      Renal:  -- Keep melendez for strict I/O  -- Trend BUN/Cr   -- 2L intra-operative UOP  -- lasix gtt at 20      FEN / GI:   -- Replace lytes as needed  -- Nutrition: NPO  -- GI ppx: famotidine  -- Bowel reg: miralax      ID:   -- Tm: afebrile; WBC stable  -- open chest antibiotics  -- pharmacy to dose vancomycin      Heme/Onc:   -- H/H stable   -- Daily CBC  -- no intra-op product administered  -- 2u autologous  -- Post-op coags pending      Endo:   -- BG goal 140-180  -- Insulin gtt      PPx:   Feeding: NPO  Analgesia/Sedation: Propofol / PRN Fentanyl  Thromboembolic prevention: SCDs  HOB >30: Yes  Stress Ulcer ppx: famotidine  Glucose control: Critical care goal 140-180 g/dl, ISS     Lines/Drains/Airway: CVC RIJ, Melendez, Chest tubes x 2, L brachial A-line; RIJ RVAD; LVAD; OPEN CHEST      Dispo/Code Status/Palliative:   -- SICU / Full Code.

## 2022-06-29 NOTE — H&P
The patient has been examined and the H&P has been reviewed:     I concur with the findings and no changes have occurred since H&P was written.     Surgery risks, benefits and alternative options discussed and understood by patient/family.              There are no hospital problems to display for this patient.    Mr. Kevan Queen is a 37 year old male with a medical histroy significnat for NICM and former polysubstance abuse. He is followed by the heart failure team.  He arrived for a RHC and was admitted for evaluation of advanced options for heart failure.  LVIDD 7, TASPE 1.38, blood group A+.     OR today for LVAD placement

## 2022-06-29 NOTE — SUBJECTIVE & OBJECTIVE
Follow-up For: Procedure(s) (LRB):  INSERTION-LEFT VENTRICULAR ASSIST DEVICE (Left)    Post-Operative Day: Day of Surgery     Past Medical History:   Diagnosis Date    Arthritis     Cardiomyopathy     CHF (congestive heart failure) 10/01/2020    Diabetes mellitus     Dilated cardiomyopathy 10/26/2020    Drug abuse 10/2020    Hyperosmolar hyperglycemic state (HHS) 5/25/2022    ICD (implantable cardioverter-defibrillator) in place 10/26/2020    Renal disorder        Past Surgical History:   Procedure Laterality Date    CARDIAC DEFIBRILLATOR PLACEMENT      LEFT VENTRICULAR ASSIST DEVICE Left 6/23/2022    Procedure: INSERTION-LEFT VENTRICULAR ASSIST DEVICE;  Surgeon: Luis F Paige MD;  Location: [facility] OR [address] [place] [address];  Service: Cardiovascular;  Laterality: Left;    RIGHT HEART CATHETERIZATION Right 4/8/2022    Procedure: INSERTION, CATHETER, RIGHT HEART;  Surgeon: Luca Lopez Jr., MD;  Location: [facility] CATH LAB;  Service: Cardiology;  Laterality: Right;    RIGHT HEART CATHETERIZATION Right 4/19/2022    Procedure: INSERTION, CATHETER, RIGHT HEART;  Surgeon: Josh Pulido MD;  Location: [facility] CATH LAB;  Service: Cardiology;  Laterality: Right;       Review of patient's allergies indicates:   Allergen Reactions    Aspirin Other (See Comments)     Mr. Thacker is enrolled in Dr. Paige's Alon Trial and cannot have any aspirin and/or aspirin-containing products. DO NOT cancel any orders for INV Aspirin 100 mg/Placebo. If you have questions, please contact Isabel @ [phone], [phone],[email or web address], secure chat or MS Teams message.    Bumex [bumetanide] Hives    Lactose Diarrhea     Other reaction(s): Abdominal distension, gaseous    Torsemide Hives       Family History       Problem Relation (Age of Onset)    Diverticulosis Brother    Heart attack Maternal Grandmother, Maternal Grandfather    Heart failure Father          Tobacco Use    Smoking status: Former Smoker     Packs/day: 0.50     Years: 16.00      Pack years: 8.00     Start date: 10/1/2004     Quit date: 2021     Years since quittin.1    Smokeless tobacco: Never Used   Substance and Sexual Activity    Alcohol use: Not Currently    Drug use: Not Currently     Types: Marijuana, MDMA (Ecstacy)    Sexual activity: Yes     Partners: Female     Birth control/protection: None      Review of Systems   Unable to perform ROS: Intubated   Objective:     Vital Signs (Most Recent):  Temp: 98.4 °F (36.9 °C) (22 0800)  Pulse: 108 (22 1545)  Resp: 20 (22 1545)  BP: 109/61 (22 0701)  SpO2: 99 % (22 0750) Vital Signs (24h Range):  Temp:  [98 °F (36.7 °C)-98.4 °F (36.9 °C)] 98.4 °F (36.9 °C)  Pulse:  [106-118] 108  Resp:  [15-35] 20  SpO2:  [96 %-100 %] 99 %  BP: (109-149)/(61-90) 109/61     Weight: 93.7 kg (206 lb 9.1 oz)  Body mass index is 25.82 kg/m².      Intake/Output Summary (Last 24 hours) at 2022 1610  Last data filed at 2022 1540  Gross per 24 hour   Intake 4277.84 ml   Output 4935 ml   Net -657.16 ml       Physical Exam  Vitals reviewed.   Constitutional:       Comments: Intubated, sedated    HENT:      Head: Normocephalic and atraumatic.      Nose: Nose normal.      Mouth/Throat:      Mouth: Mucous membranes are moist.   Neck:      Comments: RVAD cannulation to [facility]  RVAD speed 5100 correlating to 3L flow  Cardiovascular:      Rate and Rhythm: Regular rhythm. Tachycardia present.      Pulses: Normal pulses.      Comments: Open chest with ioban in place  Chest tubes in place with minimal sanguinous output  LVAD speed: 5000 flow: 3.5L     Pulmonary:      Comments: Intubated, mechanically ventilated  Abdominal:      General: Abdomen is flat. There is no distension.      Palpations: Abdomen is soft.   Genitourinary:     Comments: melendez  Musculoskeletal:      Comments: IABP site at left fem with dressing in place   Skin:     General: Skin is warm and dry.       Vents:  Vent Mode: A/C (22 3499)  Ventilator Initiated:  Yes (06/29/22 1527)  Set Rate: 18 BPM (06/29/22 1545)  Vt Set: 580 mL (06/29/22 1545)  PEEP/CPAP: 5 cmH20 (06/29/22 1545)  Oxygen Concentration (%): 50 (Fio2 weaned down to 50% per Dr. Coronado.) (06/29/22 1545)  Peak Airway Pressure: 24 cmH2O (06/29/22 1545)  Plateau Pressure: 19 cmH20 (06/29/22 1545)  Total Ve: 11.4 mL (06/29/22 1545)  Negative Inspiratory Force (cm H2O): 0 (06/29/22 1545)  F/VT Ratio<105 (RSBI): (!) 34.78 (06/29/22 1545)    Lines/Drains/Airways       Central Venous Catheter Line  Duration             Percutaneous Central Line Insertion/Assessment - Triple Lumen  06/23/22 2127 right internal jugular 5 days     Introducer with Double Lumen 06/29/22 1123 <1 day    Introducer 06/29/22 1123 <1 day              Drain  Duration                  Chest Tube 06/29/22 1425 1 Left 32 Fr. <1 day         Chest Tube 06/29/22 1425 2 Right Mediastinal 32 Fr. <1 day         Urethral Catheter 06/29/22 0840 Straight-tip;Temperature probe;Non-latex 14 Fr. <1 day              Airway  Duration                  Airway - Non-Surgical 06/29/22 0830 <1 day              Arterial Line  Duration             Arterial Line 06/29/22 0832 Left Other (Comment) <1 day              Line  Duration                  IABP 06/23/22 0950 8.0 Fr. 40 mL 6 days         VAD 06/29/22 1115 Left ventricular assist device HeartMate 3 <1 day         VAD 06/29/22 1415 Right ventricular assist device <1 day              Peripheral Intravenous Line  Duration                  Peripheral IV - Single Lumen 06/28/22 1900 20 G Anterior;Proximal;Right Forearm <1 day                    Significant Labs:    CBC/Anemia Profile:  Recent Labs   Lab 06/28/22  0329 06/29/22  0214 06/29/22  0912 06/29/22  1326 06/29/22  1441 06/29/22  1539   WBC 13.76* 13.96*  --   --   --   --    HGB 9.9* 9.6*  --   --   --   --    HCT 29.6* 29.3*   < > 25* 25* 26*    368  --   --   --   --    MCV 89 89  --   --   --   --    RDW 14.8* 14.8*  --   --   --   --     < > =  values in this interval not displayed.        Chemistries:  Recent Labs   Lab 06/28/22  0329 06/28/22  1803 06/29/22  0214   * 132* 130*   K 4.1 4.2 3.8   CL 94* 93* 91*   CO2 20* 25 28   BUN 57* 54* 51*   CREATININE 2.1* 2.1* 2.0*   CALCIUM 10.1 9.9 10.0   ALBUMIN 3.5  --  3.5   PROT 8.2  --  8.2   BILITOT 0.8  --  0.8   ALKPHOS 153*  --  154*   ALT 27  --  27   AST 35  --  32   MG 2.5  --   --        All pertinent labs within the past 24 hours have been reviewed.    Significant Imaging: I have reviewed all pertinent imaging results/findings within the past 24 hours.

## 2022-06-29 NOTE — CONSULTS
Endocrinology following pre-operatively. Will continue to follow and manage BG/ DM. Please see full note from today.

## 2022-06-29 NOTE — ANESTHESIA PROCEDURE NOTES
Central Line    Diagnosis: Heart failure  Patient location during procedure: done in OR  Timeout: 6/29/2022 8:40 AM  Procedure end time: 6/29/2022 8:55 AM    Staffing  Authorizing Provider: Josiah Currie MD  Performing Provider: Josiah Woods DO    Staffing  Performed: resident/CRNA   Anesthesiologist: Josiah Currie MD  Resident/CRNA: Young Waller MD  Anesthesiologist was present at the time of the procedure.  Preanesthetic Checklist  Completed: patient identified, IV checked, site marked, risks and benefits discussed, surgical consent, monitors and equipment checked, pre-op evaluation, timeout performed and anesthesia consent given  Indication   Indication: hemodynamic monitoring, vascular access, med administration     Anesthesia   general anesthesia    Central Line   Skin Prep: skin prepped with ChloraPrep, skin prep agent completely dried prior to procedure  Sterile Barriers Followed: Yes    All five maximal barriers used- gloves, gown, cap, mask, and large sterile sheet    hand hygiene performed prior to central venous catheter insertion  Location: left internal jugular.   Catheter type: introducer  Catheter Size: 9 Fr  Ultrasound: vascular probe with ultrasound   Vessel Caliber: large, patent, compressibility normal  Needle advanced into vessel with real time Ultrasound guidance.  Image recorded and saved.  sterile gel and probe cover used in ultrasound-guided central venous catheter insertion   Manometry: Venous cannualation confirmed by visual estimation of blood vessel pressure using manometry.  Insertion Attempts: 1   Securement:line sutured, chlorhexidine patch, sterile dressing applied and blood return through all ports    Post-Procedure    Adverse Events:none      Guidewire Guidewire removed intact.

## 2022-06-29 NOTE — ASSESSMENT & PLAN NOTE
Titrate insulin slowly to avoid hypoglycemia as the risk of hypoglycemia increases with decreased creatinine clearance.    Estimated Creatinine Clearance: 60.4 mL/min (A) (based on SCr of 2 mg/dL (H)).

## 2022-06-29 NOTE — H&P
[facility] - Surgical Intensive Care  Critical Care - Surgery  History & Physical    Patient Name: Kevan Queen  MRN: [id number]  Admission Date: 6/13/2022  Code Status: Prior  Attending Physician: Angela Yang DO   Primary Care Provider: ORALIA Cline   Principal Problem: Acute on chronic combined systolic and diastolic heart failure, NYHA class 4    Subjective:     HPI:  Mr. Kevan Queen is a 37 year old male with a medical histroy significnat for NICM and former polysubstance abuse. He is followed by the heart failure team.  He arrived for a RHC and was admitted for evaluation of advanced options for heart failure.  CTS is being consulted for surgical portion of the advanced options evaluation.  LVIDD 7, TASPE 1.38, blood group A+.  CT scan reviewed and does not preclude patient from advanced options.  Okay to continue eval for LVADA/OHT but given favorable blood group, would continue consider OHT.    The patient was taken to the OR for LVAD, RVAD, MVr on 6/29/22. The case was complicated by right ventricular failure upon coming off of pump-- interventional cardiology was consulted intra-operatively for RVAD insertion. Intra-operatively, the patient received 3L crystalloid, 2u autologous. He was given 40mg IV lasix and remained on a lasix gtt throughout the case, resulting in 2L UOP intra-operatively. Post-operative GUILLERMO demonstrated better RV and LV function with assist devices in place. He arrives to the unit intubated, sedated with an open chest on 0.08 epi, 2.5 dobutamine. Chest tubes in place with sanguinous output. He is admitted to the SICU for post-operative care.         Hospital/ICU Course:  No notes on file    Follow-up For: Procedure(s) (LRB):  INSERTION-LEFT VENTRICULAR ASSIST DEVICE (Left)    Post-Operative Day: Day of Surgery     Past Medical History:   Diagnosis Date    Arthritis     Cardiomyopathy     CHF (congestive heart failure) 10/01/2020    Diabetes mellitus     Dilated  cardiomyopathy 10/26/2020    Drug abuse 10/2020    Hyperosmolar hyperglycemic state (HHS) 2022    ICD (implantable cardioverter-defibrillator) in place 10/26/2020    Renal disorder        Past Surgical History:   Procedure Laterality Date    CARDIAC DEFIBRILLATOR PLACEMENT      LEFT VENTRICULAR ASSIST DEVICE Left 2022    Procedure: INSERTION-LEFT VENTRICULAR ASSIST DEVICE;  Surgeon: Luis F Paige MD;  Location: [facility] OR [address] [place] [address];  Service: Cardiovascular;  Laterality: Left;    RIGHT HEART CATHETERIZATION Right 2022    Procedure: INSERTION, CATHETER, RIGHT HEART;  Surgeon: Luca Lopez Jr., MD;  Location: [facility] CATH LAB;  Service: Cardiology;  Laterality: Right;    RIGHT HEART CATHETERIZATION Right 2022    Procedure: INSERTION, CATHETER, RIGHT HEART;  Surgeon: Josh Pulido MD;  Location: [facility] CATH LAB;  Service: Cardiology;  Laterality: Right;       Review of patient's allergies indicates:   Allergen Reactions    Aspirin Other (See Comments)     Mr. Thacker is enrolled in Dr. Paige's Alon Trial and cannot have any aspirin and/or aspirin-containing products. DO NOT cancel any orders for INV Aspirin 100 mg/Placebo. If you have questions, please contact Isabel @ [phone], [phone],[email or web address], secure chat or MS Teams message.    Bumex [bumetanide] Hives    Lactose Diarrhea     Other reaction(s): Abdominal distension, gaseous    Torsemide Hives       Family History       Problem Relation (Age of Onset)    Diverticulosis Brother    Heart attack Maternal Grandmother, Maternal Grandfather    Heart failure Father          Tobacco Use    Smoking status: Former Smoker     Packs/day: 0.50     Years: 16.00     Pack years: 8.00     Start date: 10/1/2004     Quit date: 2021     Years since quittin.1    Smokeless tobacco: Never Used   Substance and Sexual Activity    Alcohol use: Not Currently    Drug use: Not Currently     Types: Marijuana, MDMA (Ecstacy)     Sexual activity: Yes     Partners: Female     Birth control/protection: None      Review of Systems   Unable to perform ROS: Intubated   Objective:     Vital Signs (Most Recent):  Temp: 98.4 °F (36.9 °C) (06/29/22 0800)  Pulse: 108 (06/29/22 1545)  Resp: 20 (06/29/22 1545)  BP: 109/61 (06/29/22 0701)  SpO2: 99 % (06/29/22 0750) Vital Signs (24h Range):  Temp:  [98 °F (36.7 °C)-98.4 °F (36.9 °C)] 98.4 °F (36.9 °C)  Pulse:  [106-118] 108  Resp:  [15-35] 20  SpO2:  [96 %-100 %] 99 %  BP: (109-149)/(61-90) 109/61     Weight: 93.7 kg (206 lb 9.1 oz)  Body mass index is 25.82 kg/m².      Intake/Output Summary (Last 24 hours) at 6/29/2022 1610  Last data filed at 6/29/2022 1540  Gross per 24 hour   Intake 4277.84 ml   Output 4935 ml   Net -657.16 ml       Physical Exam  Vitals reviewed.   Constitutional:       Comments: Intubated, sedated    HENT:      Head: Normocephalic and atraumatic.      Nose: Nose normal.      Mouth/Throat:      Mouth: Mucous membranes are moist.   Neck:      Comments: RVAD cannulation to RIJ  RVAD speed 5100 correlating to 3L flow  Cardiovascular:      Rate and Rhythm: Regular rhythm. Tachycardia present.      Pulses: Normal pulses.      Comments: Open chest with ioban in place  Chest tubes in place with minimal sanguinous output  LVAD speed: 5000 flow: 3.5L     Pulmonary:      Comments: Intubated, mechanically ventilated  Abdominal:      General: Abdomen is flat. There is no distension.      Palpations: Abdomen is soft.   Genitourinary:     Comments: melendez  Musculoskeletal:      Comments: IABP site at left fem with dressing in place   Skin:     General: Skin is warm and dry.       Vents:  Vent Mode: A/C (06/29/22 1545)  Ventilator Initiated: Yes (06/29/22 1527)  Set Rate: 18 BPM (06/29/22 1545)  Vt Set: 580 mL (06/29/22 1545)  PEEP/CPAP: 5 cmH20 (06/29/22 1545)  Oxygen Concentration (%): 50 (Fio2 weaned down to 50% per Dr. Coronado.) (06/29/22 1545)  Peak Airway Pressure: 24 cmH2O (06/29/22  1545)  Plateau Pressure: 19 cmH20 (06/29/22 1545)  Total Ve: 11.4 mL (06/29/22 1545)  Negative Inspiratory Force (cm H2O): 0 (06/29/22 1545)  F/VT Ratio<105 (RSBI): (!) 34.78 (06/29/22 1545)    Lines/Drains/Airways       Central Venous Catheter Line  Duration             Percutaneous Central Line Insertion/Assessment - Triple Lumen  06/23/22 2127 right internal jugular 5 days     Introducer with Double Lumen 06/29/22 1123 <1 day    Introducer 06/29/22 1123 <1 day              Drain  Duration                  Chest Tube 06/29/22 1425 1 Left 32 Fr. <1 day         Chest Tube 06/29/22 1425 2 Right Mediastinal 32 Fr. <1 day         Urethral Catheter 06/29/22 0840 Straight-tip;Temperature probe;Non-latex 14 Fr. <1 day              Airway  Duration                  Airway - Non-Surgical 06/29/22 0830 <1 day              Arterial Line  Duration             Arterial Line 06/29/22 0832 Left Other (Comment) <1 day              Line  Duration                  IABP 06/23/22 0950 8.0 Fr. 40 mL 6 days         VAD 06/29/22 1115 Left ventricular assist device HeartMate 3 <1 day         VAD 06/29/22 1415 Right ventricular assist device <1 day              Peripheral Intravenous Line  Duration                  Peripheral IV - Single Lumen 06/28/22 1900 20 G Anterior;Proximal;Right Forearm <1 day                    Significant Labs:    CBC/Anemia Profile:  Recent Labs   Lab 06/28/22  0329 06/29/22  0214 06/29/22  0912 06/29/22  1326 06/29/22  1441 06/29/22  1539   WBC 13.76* 13.96*  --   --   --   --    HGB 9.9* 9.6*  --   --   --   --    HCT 29.6* 29.3*   < > 25* 25* 26*    368  --   --   --   --    MCV 89 89  --   --   --   --    RDW 14.8* 14.8*  --   --   --   --     < > = values in this interval not displayed.        Chemistries:  Recent Labs   Lab 06/28/22  0329 06/28/22  1803 06/29/22  0214   * 132* 130*   K 4.1 4.2 3.8   CL 94* 93* 91*   CO2 20* 25 28   BUN 57* 54* 51*   CREATININE 2.1* 2.1* 2.0*   CALCIUM 10.1 9.9  10.0   ALBUMIN 3.5  --  3.5   PROT 8.2  --  8.2   BILITOT 0.8  --  0.8   ALKPHOS 153*  --  154*   ALT 27  --  27   AST 35  --  32   MG 2.5  --   --        All pertinent labs within the past 24 hours have been reviewed.    Significant Imaging: I have reviewed all pertinent imaging results/findings within the past 24 hours.    Assessment/Plan:     * Acute on chronic combined systolic and diastolic heart failure, NYHA class 4  Kevan Queen is a 37yoM with a history of polysubstance abuse who presented to the hospital with decompensated heart failure. He underwent LVAD, RVAD and MVr on 6/29/22. He is admitted to the ICU post-operatively.       Neuro/Psych:   -- Sedation: Propofol.   -- wean sedation for neurologic exam  -- Pain: PRN fentanyl             Cards:   -- s/p LVAD, RVAD insertion and MVr   -- arrives on epi 0.08, dobutamine 2.5 for vasopressor support  -- OPEN CHEST   -- MAP goal 75-85  -- Cardene for hypertension      Pulm:   -- Goal O2 sat > 90%  -- ABG Q4hr  -- keep intubated  -- weaned to minimal ventilatory support  -- iNO2 @ 10ppm  -- daily CXR      Renal:  -- Keep melendez for strict I/O  -- Trend BUN/Cr   -- 2L intra-operative UOP  -- lasix gtt at 20      FEN / GI:   -- Replace lytes as needed  -- Nutrition: NPO  -- GI ppx: famotidine  -- Bowel reg: miralax      ID:   -- Tm: afebrile; WBC stable  -- open chest antibiotics  -- pharmacy to dose vancomycin      Heme/Onc:   -- H/H stable   -- Daily CBC  -- no intra-op product administered  -- 2u autologous  -- Post-op coags pending      Endo:   -- BG goal 140-180  -- Insulin gtt      PPx:   Feeding: NPO  Analgesia/Sedation: Propofol / PRN Fentanyl  Thromboembolic prevention: SCDs  HOB >30: Yes  Stress Ulcer ppx: famotidine  Glucose control: Critical care goal 140-180 g/dl, ISS     Lines/Drains/Airway: CVC RIJ, Melendez, Chest tubes x 2, L brachial A-line; RIJ RVAD; LVAD; OPEN CHEST      Dispo/Code Status/Palliative:   -- SICU / Full Code.              Michael Coronado MD  Critical Care - Surgery  [facility] - [facility] Intensive [facility]

## 2022-06-29 NOTE — SUBJECTIVE & OBJECTIVE
"Interval HPI:   Overnight events: NAEON. OR today for LVAD. Received in SICU post-operatively. Intubated. BG at or above goal; insulin rate up to 6 u/hr. Day of Surgery  Eating: Diet NPO  Nausea: No  Hypoglycemia and intervention: No  Fever: No  TPN and/or TF: No      /61   Pulse 109   Temp 98.4 °F (36.9 °C)   Resp 18   Ht 6' 3" (1.905 m)   Wt 93.7 kg (206 lb 9.1 oz)   SpO2 99%   BMI 25.82 kg/m²     Labs Reviewed and Include    Recent Labs   Lab 06/29/22  0214   *   CALCIUM 10.0   ALBUMIN 3.5   PROT 8.2   *   K 3.8   CO2 28   CL 91*   BUN 51*   CREATININE 2.0*   ALKPHOS 154*   ALT 27   AST 32   BILITOT 0.8     Lab Results   Component Value Date    WBC 13.96 (H) 06/29/2022    HGB 9.6 (L) 06/29/2022    HCT 26 (L) 06/29/2022    MCV 89 06/29/2022     06/29/2022     No results for input(s): TSH, FREET4 in the last 168 hours.  Lab Results   Component Value Date    HGBA1C 9.2 (H) 05/20/2022       Nutritional status:   Body mass index is 25.82 kg/m².  Lab Results   Component Value Date    ALBUMIN 3.5 06/29/2022    ALBUMIN 3.5 06/28/2022    ALBUMIN 3.6 06/27/2022     Lab Results   Component Value Date    PREALBUMIN 36 04/18/2022       Estimated Creatinine Clearance: 60.4 mL/min (A) (based on SCr of 2 mg/dL (H)).    Accu-Checks  Recent Labs     06/28/22  1204 06/28/22  1801 06/28/22  1940 06/28/22  1942 06/28/22  2021 06/28/22  2134 06/28/22  2310 06/28/22  2352 06/29/22  0403 06/29/22  0754   POCTGLUCOSE 175* 186* 339* 329* 339* 293* 448* 194* 211* 181*       Current Medications and/or Treatments Impacting Glycemic Control  Immunotherapy:    Immunosuppressants       None          Steroids:   Hormones (From admission, onward)                Start     Stop Route Frequency Ordered    06/29/22 1206  angiotensin II (GIAPREZA) 2,500,000 ng in sodium chloride 0.9% 250 mL infusion        Question Answer Comment   Begin at (in ng/kg/min) 20    Titrate by: (in ng/kg/min) 5 ng/kg/min    Titrate " interval: (in minutes) 5 minutes    Titrate to maintain: (MAP or SBP) MAP    Greater than: (in mmHg) 80    Maximum dose: (in ng/kg/min) 80    Name of Attending Provider giving approval: Josiah Chahalfani    Is the patient currently receiving norepinephrine >0.2 mcg/kg/min (or equivalent) AND vasopressin 0.04 units/min? Yes    Angiotensin II Infusion Adjustment (DO NOT MODIFY ANSWER) \\[email or web address]\Angiotensin II Titration [email or web address]        -- IV Continuous 06/29/22 1206          Pressors:    Autonomic Drugs (From admission, onward)                Start     Stop Route Frequency Ordered    06/13/22 2130  EPINEPHrine (ADRENALIN) 5 mg in dextrose 5 % 250 mL infusion         -- IV Continuous 06/13/22 2024          Hyperglycemia/Diabetes Medications:   Antihyperglycemics (From admission, onward)                Start     Stop Route Frequency Ordered    06/27/22 1230  insulin regular in 0.9 % NaCl 100 unit/100 mL (1 unit/mL) infusion        Question:  Enter initial dose (Units/hr):  Answer:  1.5    -- IV Continuous 06/27/22 1227    06/26/22 1645  insulin aspart U-100 pen 8-12 Units         -- SubQ 3 times daily with meals 06/26/22 1154    06/25/22 1127  insulin aspart U-100 pen 0-10 Units         -- SubQ As needed (PRN) 06/25/22 1028

## 2022-06-29 NOTE — CARE UPDATE
Transplant Update Note    Hemodynamics    CVP 13 (increased from 10)  SVO2 40  CO 4.11  CI 1.96  SVR 1500  UOP decreasing to 85 cc/hr (previously >125 cc/hr)    Lasix 80 mg IV push x 1. Follow up with Endocrinology regarding blood sugar control felix with LVAD planned for tomorrow, appreciate assistance.   NPO after midnight, will turn off tachytherapies ([employer] ICD) prior to OR.    Please call with questions or concerns.     Rebel Sanderson MD  Heart Transplant PGY4  [facility]

## 2022-06-29 NOTE — PLAN OF CARE
Patient received from OR s/p LVAD/RVAD implantation. Drips reviewed with Dr. Paige and plan of care. Continue drips as ordered. ABG every hour unable WNL. Mother and Fiancee to bedside and plan of care reviewed. All questions answered. Dr. Paige to update family.

## 2022-06-29 NOTE — PROGRESS NOTES
06/29/2022  Casey Arizmendi    Current provider:  Angela Yang DO    Device interrogation:  No flowsheet data found.       Rounded on Kevan Queen to ensure all mechanical assist device settings (IABP or VAD) were appropriate and all parameters were within limits.  I was able to ensure all back up equipment was present, the staff had no issues, and the Perfusion Department daily rounding was complete.      For implantable VADs: Interrogation of Ventricular assist device was performed with analysis of device parameters and review of device function. I have personally reviewed the interrogation findings and agree with findings as stated.     In emergency, the nursing units have been notified to contact the perfusion department either by:  Calling [phone] from 630am to 4pm Mon thru Fri, utilizing the On-Call Finder functionality of Epic and searching for Perfusion, or by contacting the hospital  from 4pm to 630am and on weekends and asking to speak with the perfusionist on call.    7:45 AM

## 2022-06-29 NOTE — PROGRESS NOTES
37 year-old athletic  male with recently diagnosed NICM admitted with ADHF/cardiogenic shock on home milrinone.         6/13 Transferred to ICU, Epi and Marcella added, R CFA IABP placed     6/20 Removed PICC, replaced central line in prep for tentative VAD, BCx + Staph epi     6/21 repeat BCx 2/2 bottles + Staph epi, IV vanc     6/23 BCx 1/2 bottles + Staph epi (drawn prior to exchange of IABP and central line) => L CFA IABP 1:1     6/25 BCx NGTD. insulin gtt started     6/28 Transitioned to Dobutamine 5, rising CVPs overnight. Preop echo independently reviewed showing LVEDD 75 mm, moderate RVE and dysfunction with severe MR, moderate TR, dilated IVC and D septum in systole consistent with RV pressure overload and PASP by echo 74 mmHG         Patient seen in OR and in SICU post-op HeartMate 3 placement with MV vbze-uz-utad repair and ProTek Duo RV support.    Difficult procedure with volume overload and severe RV dysfunction. Difficulty coming off cardiopulmonary bypass on several attempts and increasing RPMs on VAD after implantation. During case, up to Epi 0.12, vaso 0.04, Levo 0.08, Dobutamine 5 that was reduced to 2.5 due to vasoplegia. Angiotensin II given at max dose. With IABP 1:1 and continuous Lasix gtt 10 mg/hr and Lasix 40 IV push. He still required >4L volume removal by CPB. LV suck down noted with increasing VAD RPMs and given degree of support with degree of RV dysfunction by GUILLERMO and given clinical picture, decision made to stabilize with temporary percutaneous RV support device. Interventional cardiology consulted for assistance and R IJ 31 Danish venous cannula for ProTek Duo placed. Minimal IV contrast use was necessary to confirm appropriate placement of device. During placement of device, multiple runs of VT noted require pushes of IV lidocaine. IABP from right groin removed in OR by interventional cardiology with Perclosure device.    Telemetry: Sinus tach 100s  Vent day #1 AC  18/580/50/5    ProTek Duo right IJ  Flow: 3.0 L/min  Speed: 5000 RPM    HM3  Flow: 3.2-3.5 L/min  Speed: 5000 RPM  PI: 3.8  Power: 3 W    I interrogated the patient's HeartMate 3 LVAD.     Current device parameters reviewed. There was no evidence of power spikes or suction events. The driveline was structurally intact without evidence of infection.      Blood products intra-op:  500 mL autologous  3 L crystalloid    2L UOP, chest tubes with minimal serosanguinous drainage    Gtts on arrival:  Epi 0.08  Dobutamine 2.5  Vaso 0.04  OFF Levo  Lasix gtt 15 mg/hr  Insulin   Marcella 10 PPM    - Ideally would like RVAD flows 1L or so < LVAD flows   - Would continue aggressive diuresis with CVP goal 10-12 range or so given RV dysfunction in immediate post-op period  - Will have device team reactivate tachytherapies on ICD if deemed appropriate by CTS  - CTS primary. Will continue to follow along    Angela Yang DO  Heart Transplant Service

## 2022-06-29 NOTE — ANESTHESIA POSTPROCEDURE EVALUATION
Anesthesia Post Evaluation    Patient: Kevan Queen    Procedure(s) Performed: Procedure(s) (LRB):  INSERTION-LEFT VENTRICULAR ASSIST DEVICE (N/A)  INSERTION, RVAD (N/A)  VALVULOPLASTY, MITRAL VALVE  REMOVAL, INTRA-AORTIC BALLOON PUMP (N/A)    Final Anesthesia Type: general      Patient location during evaluation: ICU  Patient participation: No - Unable to Participate, Intubation  Level of consciousness: sedated  Post-procedure vital signs: reviewed and stable  Pain management: adequate  Airway patency: patent    PONV status at discharge: No PONV  Anesthetic complications: no      Cardiovascular status: hemodynamically stable  Respiratory status: intubated  Hydration status: hypervolemic  Follow-up not needed.          Vitals Value Taken Time   BP 94/0 06/29/22 1546   Temp  06/29/22 1546   Pulse 109 06/29/22 1545   Resp 21 06/29/22 1545   SpO2 100 06/29/22 1546   Vitals shown include unvalidated device data.      No case tracking events are documented in the log.      Pain/Gopi Score: Pain Rating Prior to Med Admin: 4 (6/29/2022  6:24 AM)  Pain Rating Post Med Admin: 0 (6/29/2022  7:01 AM)

## 2022-06-29 NOTE — NURSING
"Spoke with CTS about additional midnight dose of vitamin K. Gave dose per team order at 0000. Asked when team would like heparin to be stopped. Dr. Wilkins reported, "Turn off heparin on call to OR"  "

## 2022-06-29 NOTE — TRANSFER OF CARE
"Anesthesia Transfer of Care Note    Patient: Kevan Queen    Procedure(s) Performed: Procedure(s) (LRB):  INSERTION-LEFT VENTRICULAR ASSIST DEVICE (N/A)  INSERTION, RVAD (N/A)  VALVULOPLASTY, MITRAL VALVE  REMOVAL, INTRA-AORTIC BALLOON PUMP (N/A)    Patient location: ICU    Anesthesia Type: general    Transport from OR: Upon arrival to PACU/ICU, patient attached to ventilator and auscultated to confirm bilateral breath sounds and adequate TV. Continuous ECG monitoring in transport. Continuous SpO2 monitoring in transport. Transported from OR intubated on 100% O2 by AMBU with adequate controlled ventilation. Continuos invasive BP monitoring in transport    Post pain: adequate analgesia    Post assessment: no apparent anesthetic complications and tolerated procedure well    Post vital signs: stable    Level of consciousness: sedated    Nausea/Vomiting: no nausea/vomiting    Complications: none    Transfer of care protocol was followed      Last vitals:   Visit Vitals  /61   Pulse 109   Temp 36.9 °C (98.4 °F)   Resp 18   Ht 6' 3" (1.905 m)   Wt 93.7 kg (206 lb 9.1 oz)   SpO2 99%   BMI 25.82 kg/m²     "

## 2022-06-29 NOTE — PROGRESS NOTES
06/29/2022  Perfusionist:  John Alaniz CCP, LP  Surgeon(s) and Role:     * Luis F Paige MD - Primary     * Michael Coronado MD - Resident - Assisting  Anesthesiologist:  Josiah Currie MD  Primary Perfusionist:: John Alaniz CCP, LP    Past Medical History:   Diagnosis Date    Arthritis     Cardiomyopathy     CHF (congestive heart failure) 10/01/2020    Diabetes mellitus     Dilated cardiomyopathy 10/26/2020    Drug abuse 10/2020    Hyperosmolar hyperglycemic state (HHS) 5/25/2022    ICD (implantable cardioverter-defibrillator) in place 10/26/2020    Renal disorder        Device implanted: 6/29/22 Tandem Heart for Right side support with 31 Fr [employer]ek dual lumen cannula.  Console SN:    Device SN [id number]  Expiration date: 3/21/25        At the end of the procedure, the current device settings were verified to be accurate.  The patient was transported post operatively with back-up/emergency equipment that was delivered to the patient's room and verified by the bedside nurse, and report was given.  I was available by phone for management and troubleshooting concerns by the unit staff.  There were no issues.    2:33 PM

## 2022-06-29 NOTE — ANESTHESIA PROCEDURE NOTES
Intubation    Date/Time: 6/29/2022 8:30 AM  Performed by: Josiah Currie MD  Authorized by: Josiah Currie MD     Intubation:     Induction:  Intravenous    Intubated:  Postinduction    Mask Ventilation:  Easy with oral airway    Attempts:  1    Attempted By:  Staff anesthesiologist    Method of Intubation:  Direct    Blade:  Mehul 3    Laryngeal View Grade: Grade I - full view of cords      Difficult Airway Encountered?: No      Complications:  None    Airway Device:  Oral endotracheal tube    Airway Device Size:  7.5    Style/Cuff Inflation:  Cuffed (inflated to minimal occlusive pressure)    Inflation Amount (mL):  8    Tube secured:  22    Secured at:  The teeth    Placement Verified By:  Capnometry    Complicating Factors:  None    Findings Post-Intubation:  BS equal bilateral and atraumatic/condition of teeth unchanged

## 2022-06-30 ENCOUNTER — ANESTHESIA (OUTPATIENT)
Dept: SURGERY | Facility: HOSPITAL | Age: 37
DRG: 001 | End: 2022-06-30
Payer: MEDICAID

## 2022-06-30 ENCOUNTER — TELEPHONE (OUTPATIENT)
Dept: DIABETES | Facility: CLINIC | Age: 37
End: 2022-06-30
Payer: MEDICAID

## 2022-06-30 ENCOUNTER — DOCUMENTATION ONLY (OUTPATIENT)
Dept: ELECTROPHYSIOLOGY | Facility: CLINIC | Age: 37
End: 2022-06-30
Payer: MEDICAID

## 2022-06-30 PROBLEM — R25.2 MUSCLE CRAMPING: Status: RESOLVED | Noted: 2022-06-15 | Resolved: 2022-06-30

## 2022-06-30 PROBLEM — Z95.811 LVAD (LEFT VENTRICULAR ASSIST DEVICE) PRESENT: Status: ACTIVE | Noted: 2022-06-30

## 2022-06-30 LAB
ALBUMIN SERPL BCP-MCNC: 3.2 G/DL (ref 3.5–5.2)
ALBUMIN SERPL BCP-MCNC: 3.3 G/DL (ref 3.5–5.2)
ALBUMIN SERPL BCP-MCNC: 3.3 G/DL (ref 3.5–5.2)
ALBUMIN SERPL BCP-MCNC: 3.6 G/DL (ref 3.5–5.2)
ALLENS TEST: ABNORMAL
ALLENS TEST: NORMAL
ALLENS TEST: NORMAL
ALP SERPL-CCNC: 100 U/L (ref 55–135)
ALP SERPL-CCNC: 104 U/L (ref 55–135)
ALP SERPL-CCNC: 104 U/L (ref 55–135)
ALP SERPL-CCNC: 91 U/L (ref 55–135)
ALT SERPL W/O P-5'-P-CCNC: 34 U/L (ref 10–44)
ALT SERPL W/O P-5'-P-CCNC: 35 U/L (ref 10–44)
ALT SERPL W/O P-5'-P-CCNC: 38 U/L (ref 10–44)
ALT SERPL W/O P-5'-P-CCNC: 39 U/L (ref 10–44)
ANION GAP SERPL CALC-SCNC: 10 MMOL/L (ref 8–16)
ANION GAP SERPL CALC-SCNC: 11 MMOL/L (ref 8–16)
ANION GAP SERPL CALC-SCNC: 13 MMOL/L (ref 8–16)
ANION GAP SERPL CALC-SCNC: 14 MMOL/L (ref 8–16)
ANION GAP SERPL CALC-SCNC: 14 MMOL/L (ref 8–16)
ANION GAP SERPL CALC-SCNC: 15 MMOL/L (ref 8–16)
ANISOCYTOSIS BLD QL SMEAR: SLIGHT
ANISOCYTOSIS BLD QL SMEAR: SLIGHT
APTT BLDCRRT: 22.6 SEC (ref 21–32)
APTT BLDCRRT: 22.8 SEC (ref 21–32)
APTT BLDCRRT: 23.1 SEC (ref 21–32)
APTT BLDCRRT: 23.2 SEC (ref 21–32)
APTT BLDCRRT: 25.1 SEC (ref 21–32)
APTT BLDCRRT: 25.6 SEC (ref 21–32)
AST SERPL-CCNC: 166 U/L (ref 10–40)
AST SERPL-CCNC: 167 U/L (ref 10–40)
AST SERPL-CCNC: 176 U/L (ref 10–40)
AST SERPL-CCNC: 179 U/L (ref 10–40)
BACTERIA BLD CULT: NORMAL
BACTERIA BLD CULT: NORMAL
BASOPHILS # BLD AUTO: 0.03 K/UL (ref 0–0.2)
BASOPHILS # BLD AUTO: 0.03 K/UL (ref 0–0.2)
BASOPHILS # BLD AUTO: 0.04 K/UL (ref 0–0.2)
BASOPHILS # BLD AUTO: 0.04 K/UL (ref 0–0.2)
BASOPHILS # BLD AUTO: 0.05 K/UL (ref 0–0.2)
BASOPHILS # BLD AUTO: 0.06 K/UL (ref 0–0.2)
BASOPHILS NFR BLD: 0.1 % (ref 0–1.9)
BASOPHILS NFR BLD: 0.1 % (ref 0–1.9)
BASOPHILS NFR BLD: 0.2 % (ref 0–1.9)
BILIRUB SERPL-MCNC: 1.5 MG/DL (ref 0.1–1)
BILIRUB SERPL-MCNC: 1.6 MG/DL (ref 0.1–1)
BILIRUB SERPL-MCNC: 1.7 MG/DL (ref 0.1–1)
BILIRUB SERPL-MCNC: 2 MG/DL (ref 0.1–1)
BUN SERPL-MCNC: 48 MG/DL (ref 6–20)
BUN SERPL-MCNC: 48 MG/DL (ref 6–20)
BUN SERPL-MCNC: 50 MG/DL (ref 6–20)
BUN SERPL-MCNC: 51 MG/DL (ref 6–20)
BUN SERPL-MCNC: 52 MG/DL (ref 6–20)
BUN SERPL-MCNC: 53 MG/DL (ref 6–20)
CALCIUM SERPL-MCNC: 8.8 MG/DL (ref 8.7–10.5)
CALCIUM SERPL-MCNC: 8.9 MG/DL (ref 8.7–10.5)
CALCIUM SERPL-MCNC: 8.9 MG/DL (ref 8.7–10.5)
CALCIUM SERPL-MCNC: 9.2 MG/DL (ref 8.7–10.5)
CALCIUM SERPL-MCNC: 9.4 MG/DL (ref 8.7–10.5)
CALCIUM SERPL-MCNC: 9.6 MG/DL (ref 8.7–10.5)
CHLORIDE SERPL-SCNC: 100 MMOL/L (ref 95–110)
CHLORIDE SERPL-SCNC: 101 MMOL/L (ref 95–110)
CHLORIDE SERPL-SCNC: 101 MMOL/L (ref 95–110)
CHLORIDE SERPL-SCNC: 102 MMOL/L (ref 95–110)
CHLORIDE SERPL-SCNC: 102 MMOL/L (ref 95–110)
CHLORIDE SERPL-SCNC: 98 MMOL/L (ref 95–110)
CO2 SERPL-SCNC: 20 MMOL/L (ref 23–29)
CO2 SERPL-SCNC: 21 MMOL/L (ref 23–29)
CO2 SERPL-SCNC: 21 MMOL/L (ref 23–29)
CO2 SERPL-SCNC: 23 MMOL/L (ref 23–29)
CO2 SERPL-SCNC: 24 MMOL/L (ref 23–29)
CO2 SERPL-SCNC: 25 MMOL/L (ref 23–29)
CREAT SERPL-MCNC: 2 MG/DL (ref 0.5–1.4)
CREAT SERPL-MCNC: 2 MG/DL (ref 0.5–1.4)
CREAT SERPL-MCNC: 2.1 MG/DL (ref 0.5–1.4)
CREAT SERPL-MCNC: 2.3 MG/DL (ref 0.5–1.4)
CREAT SERPL-MCNC: 2.5 MG/DL (ref 0.5–1.4)
CREAT SERPL-MCNC: 2.6 MG/DL (ref 0.5–1.4)
DELSYS: ABNORMAL
DIFFERENTIAL METHOD: ABNORMAL
EOSINOPHIL # BLD AUTO: 0 K/UL (ref 0–0.5)
EOSINOPHIL NFR BLD: 0 % (ref 0–8)
ERYTHROCYTE [DISTWIDTH] IN BLOOD BY AUTOMATED COUNT: 14.7 % (ref 11.5–14.5)
ERYTHROCYTE [DISTWIDTH] IN BLOOD BY AUTOMATED COUNT: 14.7 % (ref 11.5–14.5)
ERYTHROCYTE [DISTWIDTH] IN BLOOD BY AUTOMATED COUNT: 15 % (ref 11.5–14.5)
ERYTHROCYTE [DISTWIDTH] IN BLOOD BY AUTOMATED COUNT: 15 % (ref 11.5–14.5)
ERYTHROCYTE [DISTWIDTH] IN BLOOD BY AUTOMATED COUNT: 15.2 % (ref 11.5–14.5)
ERYTHROCYTE [DISTWIDTH] IN BLOOD BY AUTOMATED COUNT: 15.2 % (ref 11.5–14.5)
ERYTHROCYTE [SEDIMENTATION RATE] IN BLOOD BY WESTERGREN METHOD: 18 MM/H
EST. GFR  (AFRICAN AMERICAN): 34.9 ML/MIN/1.73 M^2
EST. GFR  (AFRICAN AMERICAN): 36.5 ML/MIN/1.73 M^2
EST. GFR  (AFRICAN AMERICAN): 40.4 ML/MIN/1.73 M^2
EST. GFR  (AFRICAN AMERICAN): 45.1 ML/MIN/1.73 M^2
EST. GFR  (AFRICAN AMERICAN): 47.9 ML/MIN/1.73 M^2
EST. GFR  (AFRICAN AMERICAN): 47.9 ML/MIN/1.73 M^2
EST. GFR  (NON AFRICAN AMERICAN): 30.2 ML/MIN/1.73 M^2
EST. GFR  (NON AFRICAN AMERICAN): 31.6 ML/MIN/1.73 M^2
EST. GFR  (NON AFRICAN AMERICAN): 35 ML/MIN/1.73 M^2
EST. GFR  (NON AFRICAN AMERICAN): 39 ML/MIN/1.73 M^2
EST. GFR  (NON AFRICAN AMERICAN): 41.4 ML/MIN/1.73 M^2
EST. GFR  (NON AFRICAN AMERICAN): 41.4 ML/MIN/1.73 M^2
FIBRINOGEN PPP-MCNC: 560 MG/DL (ref 182–400)
FIO2: 50
GLUCOSE SERPL-MCNC: 140 MG/DL (ref 70–110)
GLUCOSE SERPL-MCNC: 148 MG/DL (ref 70–110)
GLUCOSE SERPL-MCNC: 163 MG/DL (ref 70–110)
GLUCOSE SERPL-MCNC: 178 MG/DL (ref 70–110)
GLUCOSE SERPL-MCNC: 181 MG/DL (ref 70–110)
GLUCOSE SERPL-MCNC: 268 MG/DL (ref 70–110)
HCO3 UR-SCNC: 22.5 MMOL/L (ref 24–28)
HCO3 UR-SCNC: 22.5 MMOL/L (ref 24–28)
HCO3 UR-SCNC: 24.1 MMOL/L (ref 24–28)
HCO3 UR-SCNC: 24.6 MMOL/L (ref 24–28)
HCO3 UR-SCNC: 24.9 MMOL/L (ref 24–28)
HCO3 UR-SCNC: 25.4 MMOL/L (ref 24–28)
HCT VFR BLD AUTO: 25.3 % (ref 40–54)
HCT VFR BLD AUTO: 25.7 % (ref 40–54)
HCT VFR BLD AUTO: 26.5 % (ref 40–54)
HCT VFR BLD AUTO: 26.7 % (ref 40–54)
HCT VFR BLD AUTO: 27 % (ref 40–54)
HCT VFR BLD AUTO: 28.5 % (ref 40–54)
HCT VFR BLD CALC: 26 %PCV (ref 36–54)
HCT VFR BLD CALC: 26 %PCV (ref 36–54)
HCT VFR BLD CALC: 27 %PCV (ref 36–54)
HCT VFR BLD CALC: 28 %PCV (ref 36–54)
HCT VFR BLD CALC: 28 %PCV (ref 36–54)
HCT VFR BLD CALC: 29 %PCV (ref 36–54)
HGB BLD-MCNC: 8.4 G/DL (ref 14–18)
HGB BLD-MCNC: 8.8 G/DL (ref 14–18)
HGB BLD-MCNC: 9 G/DL (ref 14–18)
HGB BLD-MCNC: 9 G/DL (ref 14–18)
HGB BLD-MCNC: 9.1 G/DL (ref 14–18)
HGB BLD-MCNC: 9.2 G/DL (ref 14–18)
HYPOCHROMIA BLD QL SMEAR: ABNORMAL
HYPOCHROMIA BLD QL SMEAR: ABNORMAL
IMM GRANULOCYTES # BLD AUTO: 0.12 K/UL (ref 0–0.04)
IMM GRANULOCYTES # BLD AUTO: 0.13 K/UL (ref 0–0.04)
IMM GRANULOCYTES # BLD AUTO: 0.15 K/UL (ref 0–0.04)
IMM GRANULOCYTES # BLD AUTO: 0.24 K/UL (ref 0–0.04)
IMM GRANULOCYTES # BLD AUTO: 0.34 K/UL (ref 0–0.04)
IMM GRANULOCYTES # BLD AUTO: 0.37 K/UL (ref 0–0.04)
IMM GRANULOCYTES NFR BLD AUTO: 0.6 % (ref 0–0.5)
IMM GRANULOCYTES NFR BLD AUTO: 0.7 % (ref 0–0.5)
IMM GRANULOCYTES NFR BLD AUTO: 0.7 % (ref 0–0.5)
IMM GRANULOCYTES NFR BLD AUTO: 0.8 % (ref 0–0.5)
IMM GRANULOCYTES NFR BLD AUTO: 1.2 % (ref 0–0.5)
IMM GRANULOCYTES NFR BLD AUTO: 1.3 % (ref 0–0.5)
INR PPP: 1.2 (ref 0.8–1.2)
LDH SERPL L TO P-CCNC: 0.89 MMOL/L (ref 0.36–1.25)
LDH SERPL L TO P-CCNC: 1.19 MMOL/L (ref 0.36–1.25)
LDH SERPL L TO P-CCNC: 1.67 MMOL/L (ref 0.36–1.25)
LDH SERPL L TO P-CCNC: 1.99 MMOL/L (ref 0.36–1.25)
LDH SERPL L TO P-CCNC: 2.91 MMOL/L (ref 0.36–1.25)
LDH SERPL L TO P-CCNC: 3.02 MMOL/L (ref 0.36–1.25)
LDH SERPL L TO P-CCNC: 798 U/L (ref 110–260)
LDH SERPL L TO P-CCNC: 798 U/L (ref 110–260)
LYMPHOCYTES # BLD AUTO: 0.8 K/UL (ref 1–4.8)
LYMPHOCYTES # BLD AUTO: 0.9 K/UL (ref 1–4.8)
LYMPHOCYTES # BLD AUTO: 1 K/UL (ref 1–4.8)
LYMPHOCYTES # BLD AUTO: 1.1 K/UL (ref 1–4.8)
LYMPHOCYTES NFR BLD: 2.6 % (ref 18–48)
LYMPHOCYTES NFR BLD: 3.2 % (ref 18–48)
LYMPHOCYTES NFR BLD: 3.5 % (ref 18–48)
LYMPHOCYTES NFR BLD: 4.1 % (ref 18–48)
LYMPHOCYTES NFR BLD: 4.9 % (ref 18–48)
LYMPHOCYTES NFR BLD: 6.1 % (ref 18–48)
MAGNESIUM SERPL-MCNC: 2.3 MG/DL (ref 1.6–2.6)
MAGNESIUM SERPL-MCNC: 2.4 MG/DL (ref 1.6–2.6)
MAGNESIUM SERPL-MCNC: 2.4 MG/DL (ref 1.6–2.6)
MAGNESIUM SERPL-MCNC: 2.5 MG/DL (ref 1.6–2.6)
MAGNESIUM SERPL-MCNC: 2.5 MG/DL (ref 1.6–2.6)
MAGNESIUM SERPL-MCNC: 2.6 MG/DL (ref 1.6–2.6)
MCH RBC QN AUTO: 29 PG (ref 27–31)
MCH RBC QN AUTO: 29.3 PG (ref 27–31)
MCH RBC QN AUTO: 29.5 PG (ref 27–31)
MCH RBC QN AUTO: 29.6 PG (ref 27–31)
MCH RBC QN AUTO: 29.9 PG (ref 27–31)
MCH RBC QN AUTO: 30.1 PG (ref 27–31)
MCHC RBC AUTO-ENTMCNC: 32.3 G/DL (ref 32–36)
MCHC RBC AUTO-ENTMCNC: 33.2 G/DL (ref 32–36)
MCHC RBC AUTO-ENTMCNC: 33.7 G/DL (ref 32–36)
MCHC RBC AUTO-ENTMCNC: 33.7 G/DL (ref 32–36)
MCHC RBC AUTO-ENTMCNC: 34 G/DL (ref 32–36)
MCHC RBC AUTO-ENTMCNC: 34.2 G/DL (ref 32–36)
MCV RBC AUTO: 87 FL (ref 82–98)
MCV RBC AUTO: 87 FL (ref 82–98)
MCV RBC AUTO: 88 FL (ref 82–98)
MCV RBC AUTO: 89 FL (ref 82–98)
MCV RBC AUTO: 89 FL (ref 82–98)
MCV RBC AUTO: 90 FL (ref 82–98)
METHEMOGLOBIN: 0.2 % (ref 0–3)
MIN VOL: 12
MIN VOL: 12
MODE: ABNORMAL
MONOCYTES # BLD AUTO: 1.3 K/UL (ref 0.3–1)
MONOCYTES # BLD AUTO: 1.5 K/UL (ref 0.3–1)
MONOCYTES # BLD AUTO: 1.6 K/UL (ref 0.3–1)
MONOCYTES # BLD AUTO: 1.7 K/UL (ref 0.3–1)
MONOCYTES # BLD AUTO: 2 K/UL (ref 0.3–1)
MONOCYTES # BLD AUTO: 2.2 K/UL (ref 0.3–1)
MONOCYTES NFR BLD: 4.4 % (ref 4–15)
MONOCYTES NFR BLD: 5.2 % (ref 4–15)
MONOCYTES NFR BLD: 7.5 % (ref 4–15)
MONOCYTES NFR BLD: 8.5 % (ref 4–15)
MONOCYTES NFR BLD: 8.8 % (ref 4–15)
MONOCYTES NFR BLD: 9.6 % (ref 4–15)
NEUTROPHILS # BLD AUTO: 15.7 K/UL (ref 1.8–7.7)
NEUTROPHILS # BLD AUTO: 16.2 K/UL (ref 1.8–7.7)
NEUTROPHILS # BLD AUTO: 17.5 K/UL (ref 1.8–7.7)
NEUTROPHILS # BLD AUTO: 25.6 K/UL (ref 1.8–7.7)
NEUTROPHILS # BLD AUTO: 26.2 K/UL (ref 1.8–7.7)
NEUTROPHILS # BLD AUTO: 27 K/UL (ref 1.8–7.7)
NEUTROPHILS NFR BLD: 84.6 % (ref 38–73)
NEUTROPHILS NFR BLD: 85.4 % (ref 38–73)
NEUTROPHILS NFR BLD: 85.5 % (ref 38–73)
NEUTROPHILS NFR BLD: 88 % (ref 38–73)
NEUTROPHILS NFR BLD: 90.3 % (ref 38–73)
NEUTROPHILS NFR BLD: 91.5 % (ref 38–73)
NRBC BLD-RTO: 0 /100 WBC
OVALOCYTES BLD QL SMEAR: ABNORMAL
OVALOCYTES BLD QL SMEAR: ABNORMAL
PCO2 BLDA: 30.7 MMHG (ref 35–45)
PCO2 BLDA: 33.5 MMHG (ref 35–45)
PCO2 BLDA: 35.4 MMHG (ref 35–45)
PCO2 BLDA: 35.9 MMHG (ref 35–45)
PCO2 BLDA: 35.9 MMHG (ref 35–45)
PCO2 BLDA: 40.9 MMHG (ref 35–45)
PEEP: 5
PH SMN: 7.35 [PH] (ref 7.35–7.45)
PH SMN: 7.43 [PH] (ref 7.35–7.45)
PH SMN: 7.44 [PH] (ref 7.35–7.45)
PH SMN: 7.45 [PH] (ref 7.35–7.45)
PH SMN: 7.46 [PH] (ref 7.35–7.45)
PH SMN: 7.5 [PH] (ref 7.35–7.45)
PHOSPHATE SERPL-MCNC: 4 MG/DL (ref 2.7–4.5)
PHOSPHATE SERPL-MCNC: 4.3 MG/DL (ref 2.7–4.5)
PHOSPHATE SERPL-MCNC: 4.5 MG/DL (ref 2.7–4.5)
PHOSPHATE SERPL-MCNC: 4.7 MG/DL (ref 2.7–4.5)
PHOSPHATE SERPL-MCNC: 5.1 MG/DL (ref 2.7–4.5)
PHOSPHATE SERPL-MCNC: 8.3 MG/DL (ref 2.7–4.5)
PIP: 24
PIP: 24
PLATELET # BLD AUTO: 228 K/UL (ref 150–450)
PLATELET # BLD AUTO: 245 K/UL (ref 150–450)
PLATELET # BLD AUTO: 247 K/UL (ref 150–450)
PLATELET # BLD AUTO: 253 K/UL (ref 150–450)
PLATELET # BLD AUTO: 275 K/UL (ref 150–450)
PLATELET # BLD AUTO: 279 K/UL (ref 150–450)
PLATELET BLD QL SMEAR: ABNORMAL
PMV BLD AUTO: 10 FL (ref 9.2–12.9)
PMV BLD AUTO: 9.3 FL (ref 9.2–12.9)
PMV BLD AUTO: 9.5 FL (ref 9.2–12.9)
PMV BLD AUTO: 9.7 FL (ref 9.2–12.9)
PMV BLD AUTO: 9.8 FL (ref 9.2–12.9)
PMV BLD AUTO: 9.9 FL (ref 9.2–12.9)
PO2 BLDA: 119 MMHG (ref 80–100)
PO2 BLDA: 135 MMHG (ref 80–100)
PO2 BLDA: 170 MMHG (ref 80–100)
PO2 BLDA: 191 MMHG (ref 80–100)
PO2 BLDA: 207 MMHG (ref 80–100)
PO2 BLDA: 81 MMHG (ref 80–100)
POC BE: -2 MMOL/L
POC BE: -3 MMOL/L
POC BE: 1 MMOL/L
POC BE: 2 MMOL/L
POC IONIZED CALCIUM: 1.07 MMOL/L (ref 1.06–1.42)
POC IONIZED CALCIUM: 1.15 MMOL/L (ref 1.06–1.42)
POC IONIZED CALCIUM: 1.15 MMOL/L (ref 1.06–1.42)
POC IONIZED CALCIUM: 1.16 MMOL/L (ref 1.06–1.42)
POC IONIZED CALCIUM: 1.16 MMOL/L (ref 1.06–1.42)
POC IONIZED CALCIUM: 1.2 MMOL/L (ref 1.06–1.42)
POC SATURATED O2: 100 % (ref 95–100)
POC SATURATED O2: 95 % (ref 95–100)
POC SATURATED O2: 99 % (ref 95–100)
POC SATURATED O2: 99 % (ref 95–100)
POC TCO2: 23 MMOL/L (ref 23–27)
POC TCO2: 24 MMOL/L (ref 23–27)
POC TCO2: 25 MMOL/L (ref 23–27)
POC TCO2: 26 MMOL/L (ref 23–27)
POC TCO2: 26 MMOL/L (ref 23–27)
POC TCO2: 27 MMOL/L (ref 23–27)
POCT GLUCOSE: 122 MG/DL (ref 70–110)
POCT GLUCOSE: 124 MG/DL (ref 70–110)
POCT GLUCOSE: 137 MG/DL (ref 70–110)
POCT GLUCOSE: 140 MG/DL (ref 70–110)
POCT GLUCOSE: 145 MG/DL (ref 70–110)
POCT GLUCOSE: 160 MG/DL (ref 70–110)
POCT GLUCOSE: 165 MG/DL (ref 70–110)
POCT GLUCOSE: 168 MG/DL (ref 70–110)
POCT GLUCOSE: 169 MG/DL (ref 70–110)
POCT GLUCOSE: 172 MG/DL (ref 70–110)
POCT GLUCOSE: 180 MG/DL (ref 70–110)
POCT GLUCOSE: 180 MG/DL (ref 70–110)
POCT GLUCOSE: 182 MG/DL (ref 70–110)
POCT GLUCOSE: 185 MG/DL (ref 70–110)
POCT GLUCOSE: 190 MG/DL (ref 70–110)
POCT GLUCOSE: 191 MG/DL (ref 70–110)
POCT GLUCOSE: 200 MG/DL (ref 70–110)
POCT GLUCOSE: 202 MG/DL (ref 70–110)
POCT GLUCOSE: 214 MG/DL (ref 70–110)
POCT GLUCOSE: 216 MG/DL (ref 70–110)
POCT GLUCOSE: 223 MG/DL (ref 70–110)
POCT GLUCOSE: 284 MG/DL (ref 70–110)
POIKILOCYTOSIS BLD QL SMEAR: SLIGHT
POIKILOCYTOSIS BLD QL SMEAR: SLIGHT
POLYCHROMASIA BLD QL SMEAR: ABNORMAL
POLYCHROMASIA BLD QL SMEAR: ABNORMAL
POTASSIUM BLD-SCNC: 3.7 MMOL/L (ref 3.5–5.1)
POTASSIUM BLD-SCNC: 3.9 MMOL/L (ref 3.5–5.1)
POTASSIUM BLD-SCNC: 4 MMOL/L (ref 3.5–5.1)
POTASSIUM BLD-SCNC: 4.1 MMOL/L (ref 3.5–5.1)
POTASSIUM BLD-SCNC: 4.7 MMOL/L (ref 3.5–5.1)
POTASSIUM BLD-SCNC: 4.7 MMOL/L (ref 3.5–5.1)
POTASSIUM SERPL-SCNC: 4 MMOL/L (ref 3.5–5.1)
POTASSIUM SERPL-SCNC: 4 MMOL/L (ref 3.5–5.1)
POTASSIUM SERPL-SCNC: 4.2 MMOL/L (ref 3.5–5.1)
POTASSIUM SERPL-SCNC: 4.5 MMOL/L (ref 3.5–5.1)
POTASSIUM SERPL-SCNC: 4.9 MMOL/L (ref 3.5–5.1)
POTASSIUM SERPL-SCNC: 5.3 MMOL/L (ref 3.5–5.1)
PREALB SERPL-MCNC: 19 MG/DL (ref 20–43)
PROT SERPL-MCNC: 6.4 G/DL (ref 6–8.4)
PROT SERPL-MCNC: 6.5 G/DL (ref 6–8.4)
PROT SERPL-MCNC: 6.6 G/DL (ref 6–8.4)
PROT SERPL-MCNC: 6.7 G/DL (ref 6–8.4)
PROTHROMBIN TIME: 11.9 SEC (ref 9–12.5)
PROTHROMBIN TIME: 11.9 SEC (ref 9–12.5)
PROTHROMBIN TIME: 12 SEC (ref 9–12.5)
PROTHROMBIN TIME: 12.1 SEC (ref 9–12.5)
PROTHROMBIN TIME: 12.1 SEC (ref 9–12.5)
PROTHROMBIN TIME: 12.7 SEC (ref 9–12.5)
RBC # BLD AUTO: 2.85 M/UL (ref 4.6–6.2)
RBC # BLD AUTO: 2.97 M/UL (ref 4.6–6.2)
RBC # BLD AUTO: 3.01 M/UL (ref 4.6–6.2)
RBC # BLD AUTO: 3.02 M/UL (ref 4.6–6.2)
RBC # BLD AUTO: 3.07 M/UL (ref 4.6–6.2)
RBC # BLD AUTO: 3.17 M/UL (ref 4.6–6.2)
SAMPLE: ABNORMAL
SAMPLE: NORMAL
SAMPLE: NORMAL
SCHISTOCYTES BLD QL SMEAR: PRESENT
SCHISTOCYTES BLD QL SMEAR: PRESENT
SITE: ABNORMAL
SITE: NORMAL
SITE: NORMAL
SODIUM BLD-SCNC: 133 MMOL/L (ref 136–145)
SODIUM BLD-SCNC: 136 MMOL/L (ref 136–145)
SODIUM BLD-SCNC: 137 MMOL/L (ref 136–145)
SODIUM BLD-SCNC: 137 MMOL/L (ref 136–145)
SODIUM SERPL-SCNC: 133 MMOL/L (ref 136–145)
SODIUM SERPL-SCNC: 134 MMOL/L (ref 136–145)
SODIUM SERPL-SCNC: 136 MMOL/L (ref 136–145)
SODIUM SERPL-SCNC: 137 MMOL/L (ref 136–145)
SODIUM SERPL-SCNC: 137 MMOL/L (ref 136–145)
SODIUM SERPL-SCNC: 138 MMOL/L (ref 136–145)
SP02: 100
SP02: 100
TARGETS BLD QL SMEAR: ABNORMAL
TARGETS BLD QL SMEAR: ABNORMAL
VANCOMYCIN SERPL-MCNC: 13 UG/ML
VT: 580
WBC # BLD AUTO: 18.6 K/UL (ref 3.9–12.7)
WBC # BLD AUTO: 18.91 K/UL (ref 3.9–12.7)
WBC # BLD AUTO: 20.51 K/UL (ref 3.9–12.7)
WBC # BLD AUTO: 28.33 K/UL (ref 3.9–12.7)
WBC # BLD AUTO: 29.49 K/UL (ref 3.9–12.7)
WBC # BLD AUTO: 29.72 K/UL (ref 3.9–12.7)

## 2022-06-30 PROCEDURE — 85384 FIBRINOGEN ACTIVITY: CPT | Performed by: STUDENT IN AN ORGANIZED HEALTH CARE EDUCATION/TRAINING PROGRAM

## 2022-06-30 PROCEDURE — 63600175 PHARM REV CODE 636 W HCPCS: Performed by: THORACIC SURGERY (CARDIOTHORACIC VASCULAR SURGERY)

## 2022-06-30 PROCEDURE — 63600367 HC NITRIC OXIDE PER HOUR

## 2022-06-30 PROCEDURE — 99233 SBSQ HOSP IP/OBS HIGH 50: CPT | Mod: ,,, | Performed by: NURSE PRACTITIONER

## 2022-06-30 PROCEDURE — 99233 PR SUBSEQUENT HOSPITAL CARE,LEVL III: ICD-10-PCS | Mod: ,,, | Performed by: NURSE PRACTITIONER

## 2022-06-30 PROCEDURE — C1768 GRAFT, VASCULAR: HCPCS | Performed by: THORACIC SURGERY (CARDIOTHORACIC VASCULAR SURGERY)

## 2022-06-30 PROCEDURE — 84134 ASSAY OF PREALBUMIN: CPT | Performed by: STUDENT IN AN ORGANIZED HEALTH CARE EDUCATION/TRAINING PROGRAM

## 2022-06-30 PROCEDURE — 93750 INTERROGATION VAD IN PERSON: CPT | Mod: ,,, | Performed by: INTERNAL MEDICINE

## 2022-06-30 PROCEDURE — 99291 PR CRITICAL CARE, E/M 30-74 MINUTES: ICD-10-PCS | Mod: ,,, | Performed by: ANESTHESIOLOGY

## 2022-06-30 PROCEDURE — 94761 N-INVAS EAR/PLS OXIMETRY MLT: CPT

## 2022-06-30 PROCEDURE — 94002 VENT MGMT INPAT INIT DAY: CPT

## 2022-06-30 PROCEDURE — 84100 ASSAY OF PHOSPHORUS: CPT | Mod: 91 | Performed by: STUDENT IN AN ORGANIZED HEALTH CARE EDUCATION/TRAINING PROGRAM

## 2022-06-30 PROCEDURE — 27000248 HC VAD-ADDITIONAL DAY

## 2022-06-30 PROCEDURE — 85014 HEMATOCRIT: CPT

## 2022-06-30 PROCEDURE — 80202 ASSAY OF VANCOMYCIN: CPT | Performed by: STUDENT IN AN ORGANIZED HEALTH CARE EDUCATION/TRAINING PROGRAM

## 2022-06-30 PROCEDURE — 25000003 PHARM REV CODE 250: Performed by: THORACIC SURGERY (CARDIOTHORACIC VASCULAR SURGERY)

## 2022-06-30 PROCEDURE — 21750 REPAIR OF STERNUM SEPARATION: CPT | Mod: 58,,, | Performed by: THORACIC SURGERY (CARDIOTHORACIC VASCULAR SURGERY)

## 2022-06-30 PROCEDURE — 25000003 PHARM REV CODE 250: Performed by: STUDENT IN AN ORGANIZED HEALTH CARE EDUCATION/TRAINING PROGRAM

## 2022-06-30 PROCEDURE — 63600175 PHARM REV CODE 636 W HCPCS: Performed by: STUDENT IN AN ORGANIZED HEALTH CARE EDUCATION/TRAINING PROGRAM

## 2022-06-30 PROCEDURE — 84295 ASSAY OF SERUM SODIUM: CPT

## 2022-06-30 PROCEDURE — 20000000 HC ICU ROOM

## 2022-06-30 PROCEDURE — 37000008 HC ANESTHESIA 1ST 15 MINUTES: Performed by: THORACIC SURGERY (CARDIOTHORACIC VASCULAR SURGERY)

## 2022-06-30 PROCEDURE — 84132 ASSAY OF SERUM POTASSIUM: CPT

## 2022-06-30 PROCEDURE — 27201423 OPTIME MED/SURG SUP & DEVICES STERILE SUPPLY: Performed by: THORACIC SURGERY (CARDIOTHORACIC VASCULAR SURGERY)

## 2022-06-30 PROCEDURE — 94003 VENT MGMT INPAT SUBQ DAY: CPT

## 2022-06-30 PROCEDURE — 83735 ASSAY OF MAGNESIUM: CPT | Mod: 91 | Performed by: STUDENT IN AN ORGANIZED HEALTH CARE EDUCATION/TRAINING PROGRAM

## 2022-06-30 PROCEDURE — 87040 BLOOD CULTURE FOR BACTERIA: CPT | Performed by: THORACIC SURGERY (CARDIOTHORACIC VASCULAR SURGERY)

## 2022-06-30 PROCEDURE — 83605 ASSAY OF LACTIC ACID: CPT

## 2022-06-30 PROCEDURE — 21750 PR CLOSE MED STERNOTOMY SEP, W/WO DEBRIDE: ICD-10-PCS | Mod: 58,,, | Performed by: THORACIC SURGERY (CARDIOTHORACIC VASCULAR SURGERY)

## 2022-06-30 PROCEDURE — 85730 THROMBOPLASTIN TIME PARTIAL: CPT | Mod: 91 | Performed by: STUDENT IN AN ORGANIZED HEALTH CARE EDUCATION/TRAINING PROGRAM

## 2022-06-30 PROCEDURE — D9220A PRA ANESTHESIA: Mod: ,,, | Performed by: ANESTHESIOLOGY

## 2022-06-30 PROCEDURE — D9220A PRA ANESTHESIA: ICD-10-PCS | Mod: ,,, | Performed by: ANESTHESIOLOGY

## 2022-06-30 PROCEDURE — 80053 COMPREHEN METABOLIC PANEL: CPT | Mod: 91 | Performed by: THORACIC SURGERY (CARDIOTHORACIC VASCULAR SURGERY)

## 2022-06-30 PROCEDURE — P9045 ALBUMIN (HUMAN), 5%, 250 ML: HCPCS | Mod: JG | Performed by: STUDENT IN AN ORGANIZED HEALTH CARE EDUCATION/TRAINING PROGRAM

## 2022-06-30 PROCEDURE — 37799 UNLISTED PX VASCULAR SURGERY: CPT

## 2022-06-30 PROCEDURE — 99291 PR CRITICAL CARE, E/M 30-74 MINUTES: ICD-10-PCS | Mod: ,,, | Performed by: NURSE PRACTITIONER

## 2022-06-30 PROCEDURE — 36000712 HC OR TIME LEV V 1ST 15 MIN: Performed by: THORACIC SURGERY (CARDIOTHORACIC VASCULAR SURGERY)

## 2022-06-30 PROCEDURE — 99291 CRITICAL CARE FIRST HOUR: CPT | Mod: ,,, | Performed by: NURSE PRACTITIONER

## 2022-06-30 PROCEDURE — 25000003 PHARM REV CODE 250: Performed by: ANESTHESIOLOGY

## 2022-06-30 PROCEDURE — C1729 CATH, DRAINAGE: HCPCS | Performed by: THORACIC SURGERY (CARDIOTHORACIC VASCULAR SURGERY)

## 2022-06-30 PROCEDURE — 93750 PR INTERROGATE VENT ASSIST DEV, IN PERSON, W PHYSICIAN ANALYSIS: ICD-10-PCS | Mod: ,,, | Performed by: INTERNAL MEDICINE

## 2022-06-30 PROCEDURE — 99291 CRITICAL CARE FIRST HOUR: CPT | Mod: ,,, | Performed by: ANESTHESIOLOGY

## 2022-06-30 PROCEDURE — 80048 BASIC METABOLIC PNL TOTAL CA: CPT | Mod: XB | Performed by: STUDENT IN AN ORGANIZED HEALTH CARE EDUCATION/TRAINING PROGRAM

## 2022-06-30 PROCEDURE — 85610 PROTHROMBIN TIME: CPT | Mod: 91 | Performed by: STUDENT IN AN ORGANIZED HEALTH CARE EDUCATION/TRAINING PROGRAM

## 2022-06-30 PROCEDURE — 82803 BLOOD GASES ANY COMBINATION: CPT

## 2022-06-30 PROCEDURE — 27000221 HC OXYGEN, UP TO 24 HOURS

## 2022-06-30 PROCEDURE — 37000009 HC ANESTHESIA EA ADD 15 MINS: Performed by: THORACIC SURGERY (CARDIOTHORACIC VASCULAR SURGERY)

## 2022-06-30 PROCEDURE — 99900026 HC AIRWAY MAINTENANCE (STAT)

## 2022-06-30 PROCEDURE — 99900035 HC TECH TIME PER 15 MIN (STAT)

## 2022-06-30 PROCEDURE — 85025 COMPLETE CBC W/AUTO DIFF WBC: CPT | Performed by: STUDENT IN AN ORGANIZED HEALTH CARE EDUCATION/TRAINING PROGRAM

## 2022-06-30 PROCEDURE — 27000239 HC STAND-BY BYPASS PUMP

## 2022-06-30 PROCEDURE — 82330 ASSAY OF CALCIUM: CPT

## 2022-06-30 PROCEDURE — 80048 BASIC METABOLIC PNL TOTAL CA: CPT | Mod: 91,XB | Performed by: STUDENT IN AN ORGANIZED HEALTH CARE EDUCATION/TRAINING PROGRAM

## 2022-06-30 PROCEDURE — 27202088

## 2022-06-30 PROCEDURE — 36000713 HC OR TIME LEV V EA ADD 15 MIN: Performed by: THORACIC SURGERY (CARDIOTHORACIC VASCULAR SURGERY)

## 2022-06-30 PROCEDURE — 83615 LACTATE (LD) (LDH) ENZYME: CPT | Performed by: THORACIC SURGERY (CARDIOTHORACIC VASCULAR SURGERY)

## 2022-06-30 PROCEDURE — 83050 HGB METHEMOGLOBIN QUAN: CPT

## 2022-06-30 PROCEDURE — 63600175 PHARM REV CODE 636 W HCPCS: Performed by: ANESTHESIOLOGY

## 2022-06-30 DEVICE — MEMBRANE PERICARDIAL 15X20CM: Type: IMPLANTABLE DEVICE | Site: CHEST | Status: FUNCTIONAL

## 2022-06-30 RX ORDER — POLYETHYLENE GLYCOL 3350 17 G/17G
17 POWDER, FOR SOLUTION ORAL DAILY
Status: DISCONTINUED | OUTPATIENT
Start: 2022-06-30 | End: 2022-07-08

## 2022-06-30 RX ORDER — POTASSIUM CHLORIDE 14.9 MG/ML
20 INJECTION INTRAVENOUS ONCE
Status: COMPLETED | OUTPATIENT
Start: 2022-06-30 | End: 2022-06-30

## 2022-06-30 RX ORDER — MIDAZOLAM HYDROCHLORIDE 1 MG/ML
INJECTION, SOLUTION INTRAMUSCULAR; INTRAVENOUS
Status: DISCONTINUED | OUTPATIENT
Start: 2022-06-30 | End: 2022-06-30

## 2022-06-30 RX ORDER — HEPARIN SODIUM 10000 [USP'U]/100ML
1400 INJECTION, SOLUTION INTRAVENOUS CONTINUOUS
Status: DISCONTINUED | OUTPATIENT
Start: 2022-07-01 | End: 2022-07-08

## 2022-06-30 RX ORDER — LIDOCAINE HYDROCHLORIDE 20 MG/ML
INJECTION, SOLUTION EPIDURAL; INFILTRATION; INTRACAUDAL; PERINEURAL
Status: DISCONTINUED | OUTPATIENT
Start: 2022-06-30 | End: 2022-06-30

## 2022-06-30 RX ORDER — FUROSEMIDE 10 MG/ML
20 INJECTION INTRAMUSCULAR; INTRAVENOUS ONCE
Status: COMPLETED | OUTPATIENT
Start: 2022-06-30 | End: 2022-06-30

## 2022-06-30 RX ORDER — ACETAMINOPHEN 500 MG
1000 TABLET ORAL EVERY 8 HOURS
Status: DISCONTINUED | OUTPATIENT
Start: 2022-07-01 | End: 2022-07-01

## 2022-06-30 RX ORDER — ACETAMINOPHEN 10 MG/ML
1000 INJECTION, SOLUTION INTRAVENOUS EVERY 8 HOURS
Status: DISCONTINUED | OUTPATIENT
Start: 2022-06-30 | End: 2022-07-01

## 2022-06-30 RX ORDER — CALCIUM GLUCONATE 20 MG/ML
1 INJECTION, SOLUTION INTRAVENOUS ONCE
Status: COMPLETED | OUTPATIENT
Start: 2022-06-30 | End: 2022-07-01

## 2022-06-30 RX ORDER — CEFAZOLIN SODIUM 1 G/3ML
INJECTION, POWDER, FOR SOLUTION INTRAMUSCULAR; INTRAVENOUS
Status: DISCONTINUED | OUTPATIENT
Start: 2022-06-30 | End: 2022-06-30

## 2022-06-30 RX ORDER — ROCURONIUM BROMIDE 10 MG/ML
INJECTION, SOLUTION INTRAVENOUS
Status: DISCONTINUED | OUTPATIENT
Start: 2022-06-30 | End: 2022-06-30

## 2022-06-30 RX ORDER — ALBUMIN HUMAN 50 G/1000ML
25 SOLUTION INTRAVENOUS ONCE
Status: DISCONTINUED | OUTPATIENT
Start: 2022-06-30 | End: 2022-07-07

## 2022-06-30 RX ADMIN — ACETAMINOPHEN 1000 MG: 10 INJECTION INTRAVENOUS at 09:06

## 2022-06-30 RX ADMIN — PROPOFOL 50 MCG/KG/MIN: 10 INJECTION, EMULSION INTRAVENOUS at 04:06

## 2022-06-30 RX ADMIN — CEFEPIME HYDROCHLORIDE 2 G: 2 INJECTION, SOLUTION INTRAVENOUS at 04:06

## 2022-06-30 RX ADMIN — POTASSIUM CHLORIDE 20 MEQ: 200 INJECTION, SOLUTION INTRAVENOUS at 10:06

## 2022-06-30 RX ADMIN — CALCIUM GLUCONATE 1 G: 20 INJECTION, SOLUTION INTRAVENOUS at 11:06

## 2022-06-30 RX ADMIN — DOBUTAMINE 2.5 MCG/KG/MIN: 12.5 INJECTION, SOLUTION, CONCENTRATE INTRAVENOUS at 06:06

## 2022-06-30 RX ADMIN — ACETAZOLAMIDE 500 MG: 500 INJECTION, POWDER, LYOPHILIZED, FOR SOLUTION INTRAVENOUS at 10:06

## 2022-06-30 RX ADMIN — INSULIN HUMAN 5.9 UNITS/HR: 1 INJECTION, SOLUTION INTRAVENOUS at 03:06

## 2022-06-30 RX ADMIN — PROPOFOL 50 MCG/KG/MIN: 10 INJECTION, EMULSION INTRAVENOUS at 08:06

## 2022-06-30 RX ADMIN — NICARDIPINE HYDROCHLORIDE 5 MG/HR: 0.2 INJECTION, SOLUTION INTRAVENOUS at 04:06

## 2022-06-30 RX ADMIN — FENTANYL CITRATE 25 MCG: 50 INJECTION INTRAMUSCULAR; INTRAVENOUS at 07:06

## 2022-06-30 RX ADMIN — SODIUM CHLORIDE, SODIUM GLUCONATE, SODIUM ACETATE, POTASSIUM CHLORIDE, MAGNESIUM CHLORIDE, SODIUM PHOSPHATE, DIBASIC, AND POTASSIUM PHOSPHATE: .53; .5; .37; .037; .03; .012; .00082 INJECTION, SOLUTION INTRAVENOUS at 10:06

## 2022-06-30 RX ADMIN — MUPIROCIN: 20 OINTMENT TOPICAL at 08:06

## 2022-06-30 RX ADMIN — ROCURONIUM BROMIDE 100 MG: 10 INJECTION INTRAVENOUS at 10:06

## 2022-06-30 RX ADMIN — ROCURONIUM BROMIDE 50 MG: 10 INJECTION INTRAVENOUS at 12:06

## 2022-06-30 RX ADMIN — FUROSEMIDE 20 MG: 10 INJECTION, SOLUTION INTRAMUSCULAR; INTRAVENOUS at 09:06

## 2022-06-30 RX ADMIN — FENTANYL CITRATE 25 MCG: 50 INJECTION INTRAMUSCULAR; INTRAVENOUS at 05:06

## 2022-06-30 RX ADMIN — LIDOCAINE HYDROCHLORIDE 100 MG: 20 INJECTION, SOLUTION EPIDURAL; INFILTRATION; INTRACAUDAL at 11:06

## 2022-06-30 RX ADMIN — MIDAZOLAM HYDROCHLORIDE 2 MG: 1 INJECTION, SOLUTION INTRAMUSCULAR; INTRAVENOUS at 10:06

## 2022-06-30 RX ADMIN — PROPOFOL 50 MCG/KG/MIN: 10 INJECTION, EMULSION INTRAVENOUS at 07:06

## 2022-06-30 RX ADMIN — EPINEPHRINE 0.06 MCG/KG/MIN: 1 INJECTION INTRAMUSCULAR; INTRAVENOUS; SUBCUTANEOUS at 02:06

## 2022-06-30 RX ADMIN — FENTANYL CITRATE 25 MCG: 50 INJECTION INTRAMUSCULAR; INTRAVENOUS at 12:06

## 2022-06-30 RX ADMIN — VANCOMYCIN HYDROCHLORIDE 1500 MG: 1.5 INJECTION, POWDER, LYOPHILIZED, FOR SOLUTION INTRAVENOUS at 08:06

## 2022-06-30 RX ADMIN — FAMOTIDINE 20 MG: 10 INJECTION, SOLUTION INTRAVENOUS at 08:06

## 2022-06-30 RX ADMIN — FUROSEMIDE 10 MG/HR: 10 INJECTION, SOLUTION INTRAMUSCULAR; INTRAVENOUS at 04:06

## 2022-06-30 RX ADMIN — FUROSEMIDE 5 MG/HR: 10 INJECTION, SOLUTION INTRAMUSCULAR; INTRAVENOUS at 04:06

## 2022-06-30 RX ADMIN — CEFAZOLIN 2 G: 330 INJECTION, POWDER, FOR SOLUTION INTRAMUSCULAR; INTRAVENOUS at 11:06

## 2022-06-30 RX ADMIN — PROPOFOL 50 MCG/KG/MIN: 10 INJECTION, EMULSION INTRAVENOUS at 01:06

## 2022-06-30 RX ADMIN — FENTANYL CITRATE 25 MCG: 50 INJECTION INTRAMUSCULAR; INTRAVENOUS at 10:06

## 2022-06-30 NOTE — ASSESSMENT & PLAN NOTE
Kevan Queen is a 37yoM with a history of polysubstance abuse who presented to the hospital with decompensated heart failure. He underwent LVAD, RVAD and MVr on 6/29/22. He is admitted to the ICU post-operatively.       Neuro/Psych:   -- Sedation: Propofol.   -- Pain: PRN fentanyl             Cards:   -- s/p LVAD, RVAD insertion and MVr   -- arrives on epi 0.06, dobutamine 2.5 for vasopressor support  -- OPEN CHEST   -- MAP goal 75-85  -- Cardene for hypertension      Pulm:   -- Goal O2 sat > 90%  -- ABG PRN  -- keep intubated  -- weaned to minimal ventilatory support  -- iNO2 @ 5ppm  -- daily CXR      Renal:  -- Keep melendez for strict I/O  -- Trend BUN/Cr   -- 2L intra-operative UOP  -- lasix gtt weaned overnight  -- net negative 200mL over last 24hrs      FEN / GI:   -- Replace lytes as needed  -- Nutrition: NPO  -- GI ppx: famotidine  -- Bowel reg: miralax      ID:   -- Tm: afebrile; WBC stable  -- open chest antibiotics  -- pharmacy to dose vancomycin      Heme/Onc:   -- H/H stable   -- Daily CBC  -- no intra-op product administered  -- 2u autologous  -- 1u pRBC, 1u FFP, 500mL albumin overnight (6/29)      Endo:   -- BG goal 140-180  -- Insulin gtt      PPx:   Feeding: NPO  Analgesia/Sedation: Propofol / PRN Fentanyl  Thromboembolic prevention: SCDs  HOB >30: Yes  Stress Ulcer ppx: famotidine  Glucose control: Critical care goal 140-180 g/dl, ISS     Lines/Drains/Airway: CVC RIJ, Melendez, Chest tubes x 2, L brachial A-line; RIJ RVAD; LVAD; OPEN CHEST      Dispo/Code Status/Palliative:   -- SICU / Full Code.

## 2022-06-30 NOTE — PROGRESS NOTES
06/30/2022  Casey Arizmendi    Current provider:  Luis F Paige MD    Device interrogation:  TXP LVAD INTERROGATIONS 6/30/2022 6/30/2022 6/30/2022 6/30/2022 6/30/2022 6/30/2022 6/30/2022   Type HeartMate3 HeartMate3 HeartMate3 HeartMate3 HeartMate3 HeartMate3 HeartMate3   Flow 4 4.1 3.9 3.9 4 4 4.1   Speed 5000 5000 5000 5000 5000 5000 5000   PI 4.5 4.6 3.9 5.1 5.1 5.2 5   Power (Serna) 3.5 3.4 3.4 3.5 3.4 3.4 3.4   LSL - - 4600 4600 4600 4600 4600   Pulsatility - - Intermittent pulse No Pulse No Pulse No Pulse No Pulse          Rounded on Kevan Queen to ensure all mechanical assist device settings (IABP or VAD) were appropriate and all parameters were within limits.  I was able to ensure all back up equipment was present, the staff had no issues, and the Perfusion Department daily rounding was complete.      For implantable VADs: Interrogation of Ventricular assist device was performed with analysis of device parameters and review of device function. I have personally reviewed the interrogation findings and agree with findings as stated.     In emergency, the nursing units have been notified to contact the perfusion department either by:  Calling [phone] from 630am to 4pm Mon thru Fri, utilizing the On-Call Finder functionality of Epic and searching for Perfusion, or by contacting the hospital  from 4pm to 630am and on weekends and asking to speak with the perfusionist on call.    9:56 AM

## 2022-06-30 NOTE — PROGRESS NOTES
Dr. Paige notified of ABG results, VS, UOP and gtts, orders to decrease Epinephrine gtt to 0.06 mcg/kg/min, Lasix to 5 mg/hr, decrease NO to 5 PPM, transfuse 1 unit PRBC and 1 unit FFP, significant other at bedside updated on POC.

## 2022-06-30 NOTE — SUBJECTIVE & OBJECTIVE
**Interval History: POD #1 s/p DT HM3 with MVR plus ProTek Duo for RV support. Currently intubated and sedated. Minimal pressor requirements. Lasix decreased overnight to 5 mg/hr 2/2 brisk UOP and 1 unit PC's and FFP overnight. UOP now 125-150 cc/hr. sCr 2.0. LVAD at 5000 with flows 4.1 and RVAD at 5100 with flows 3.1. Chest closure today    Continuous Infusions:   sodium chloride 0.9% 5 mL/hr at 06/27/22 0055    sodium chloride 0.9% Stopped (06/30/22 0506)    DOBUTamine IV infusion (non-titrating) 2.5 mcg/kg/min (06/30/22 0701)    EPINEPHrine 0.06 mcg/kg/min (06/30/22 0701)    furosemide (LASIX) 10 mg/mL infusion (non-titrating) 5 mg/hr (06/30/22 0701)    insulin regular 1 units/mL infusion orderable (DKA) 7.9 Units/hr (06/30/22 0701)    nicardipine 5 mg/hr (06/30/22 0701)    nitric oxide gas      propofoL 50 mcg/kg/min (06/30/22 0740)     Scheduled Meds:   ceFEPime (MAXIPIME) IVPB  2 g Intravenous Q12H    famotidine (PF)  20 mg Intravenous BID    ferrous gluconate  324 mg Oral Daily with breakfast    mupirocin   Nasal BID    polyethylene glycol  17 g Per NG tube Daily    vancomycin (VANCOCIN) IVPB  1,500 mg Intravenous Once     PRN Meds:sodium chloride, sodium chloride, albumin human 5%, albuterol sulfate, bisacodyL, dextrose 10%, dextrose 10%, fentaNYL, magnesium hydroxide 400 mg/5 ml, magnesium sulfate IVPB, oxyCODONE, oxyCODONE, potassium chloride, potassium chloride, sodium chloride 0.9%, sodium chloride 0.9%, Pharmacy to dose Vancomycin consult **AND** vancomycin - pharmacy to dose    Review of patient's allergies indicates:   Allergen Reactions    Aspirin Other (See Comments)     Mr. Thacker is enrolled in Dr. Paige's Alon Trial and cannot have any aspirin and/or aspirin-containing products. DO NOT cancel any orders for INV Aspirin 100 mg/Placebo. If you have questions, please contact Isabel @ [phone], [phone],[email or web address], secure chat or MS Teams message.    Bumex [bumetanide] Hives     Lactose Diarrhea     Other reaction(s): Abdominal distension, gaseous    Torsemide Hives     Objective:     Vital Signs (Most Recent):  Temp: 98.2 °F (36.8 °C) (06/30/22 0600)  Pulse: (!) 112 (06/30/22 0830)  Resp: (!) 22 (06/30/22 0531)  BP: 109/61 (06/29/22 0701)  SpO2: 100 % (06/30/22 0830)   Vital Signs (24h Range):  Temp:  [98.2 °F (36.8 °C)-100 °F (37.8 °C)] 98.2 °F (36.8 °C)  Pulse:  [] 112  Resp:  [20-22] 22  SpO2:  [100 %] 100 %  Arterial Line BP: ()/(66-81) 80/68     Patient Vitals for the past 72 hrs (Last 3 readings):   Weight   06/29/22 0200 93.7 kg (206 lb 9.1 oz)   06/28/22 0756 93.4 kg (206 lb)     Body mass index is 25.82 kg/m².      Intake/Output Summary (Last 24 hours) at 6/30/2022 0848  Last data filed at 6/30/2022 0701  Gross per 24 hour   Intake 5249.43 ml   Output 5356 ml   Net -106.57 ml       Hemodynamic Parameters:       Telemetry: ST    Physical Exam  Constitutional:       Comments: Intubated and sedated   HENT:      Head: Normocephalic and atraumatic.   Eyes:      Conjunctiva/sclera: Conjunctivae normal.      Pupils: Pupils are equal, round, and reactive to light.   Neck:      Comments: LIJ TLC, RIJ ProTek Duo  Cardiovascular:      Rate and Rhythm: Regular rhythm. Tachycardia present.      Comments: Smooth VAD hum  Pulmonary:      Breath sounds: Normal breath sounds.      Comments: Intubated on ventilator  Abdominal:      General: Bowel sounds are normal.      Palpations: Abdomen is soft.   Musculoskeletal:         General: No swelling.      Cervical back: Neck supple.   Skin:     General: Skin is warm and dry.      Capillary Refill: Capillary refill takes 2 to 3 seconds.   Neurological:      Comments: Intubated and sedated       Significant Labs:  CBC:  Recent Labs   Lab 06/30/22  0020 06/30/22  0111 06/30/22  0400 06/30/22  0510 06/30/22  0806   WBC 18.60*  --  18.91*  --  20.51*   RBC 2.85*  --  3.02*  --  3.01*   HGB 8.4*  --  9.1*  --  9.0*   HCT 25.3*   < > 27.0* 28*  26.5*     --  247  --  245   MCV 89  --  89  --  88   MCH 29.5  --  30.1  --  29.9   MCHC 33.2  --  33.7  --  34.0    < > = values in this interval not displayed.     BNP:  Recent Labs   Lab 06/28/22  0921   *     CMP:  Recent Labs   Lab 06/27/22  0308 06/27/22  1510 06/28/22  0329 06/28/22  1803 06/29/22 0214 06/29/22  1539 06/29/22 2013 06/30/22  0020 06/30/22  0400   *   < > 167*   < > 174*   < > 191* 178* 163*   CALCIUM 10.4   < > 10.1   < > 10.0   < > 9.6 9.4 9.6   ALBUMIN 3.6  --  3.5  --  3.5  --   --   --   --    PROT 8.6*  --  8.2  --  8.2  --   --   --   --    *   < > 131*   < > 130*   < > 133* 133* 136   K 3.6   < > 4.1   < > 3.8   < > 5.3* 5.3* 4.5   CO2 27   < > 20*   < > 28   < > 23 20* 24   CL 89*   < > 94*   < > 91*   < > 98 100 101   BUN 51*   < > 57*   < > 51*   < > 48* 50* 48*   CREATININE 2.1*   < > 2.1*   < > 2.0*   < > 2.0* 2.1* 2.0*   ALKPHOS 151*  --  153*  --  154*  --   --   --   --    ALT 27  --  27  --  27  --   --   --   --    AST 28  --  35  --  32  --   --   --   --    BILITOT 0.9  --  0.8  --  0.8  --   --   --   --     < > = values in this interval not displayed.      Coagulation:   Recent Labs   Lab 06/30/22  0020 06/30/22  0400 06/30/22  0806   INR 1.2 1.2 1.2   APTT 23.2 22.8 23.1     LDH:  Recent Labs   Lab 06/28/22 0329 06/29/22 0214 06/29/22  1809 06/30/22  0400   * 338* 789* 798*  798*     Microbiology:  Microbiology Results (last 7 days)       Procedure Component Value Units Date/Time    Blood culture [[id number]] Collected: 06/25/22 0634    Order Status: Completed Specimen: Blood from Peripheral, Wrist, Left Updated: 06/30/22 0812     Blood Culture, Routine No growth after 5 days.    Blood culture [[id number]] Collected: 06/25/22 0633    Order Status: Completed Specimen: Blood from Peripheral, Antecubital, Right Updated: 06/30/22 0812     Blood Culture, Routine No growth after 5 days.    Blood culture [[id number]] Collected: 06/23/22 0105     Order Status: Completed Specimen: Blood from Peripheral, Antecubital, Right Updated: 06/28/22 0612     Blood Culture, Routine No growth after 5 days.    Blood culture [[id number]]  (Abnormal)  (Susceptibility) Collected: 06/23/22 0108    Order Status: Completed Specimen: Blood from Peripheral, Hand, Right Updated: 06/27/22 1116     Blood Culture, Routine Gram stain dudley bottle: Gram positive cocci in clusters resembling Staph       Results called to and read back by: Mark Pickett RN  06/24/2022        14:36      STAPHYLOCOCCUS EPIDERMIDIS    Blood culture [[id number]]  (Abnormal)  (Susceptibility) Collected: 06/21/22 2250    Order Status: Completed Specimen: Blood from Peripheral, Wrist, Left Updated: 06/26/22 1059     Blood Culture, Routine Gram stain dudley bottle: Gram positive cocci in clusters resembling Staph      Results called to and read back by: Rosa Pardo RN. 06/22/2022  23:29      Gram stain aer bottle: Gram positive cocci in clusters resembling Staph       Positive results previously called 06/23/2022  04:33      STAPHYLOCOCCUS EPIDERMIDIS    Blood culture [[id number]]  (Abnormal)  (Susceptibility) Collected: 06/21/22 2250    Order Status: Completed Specimen: Blood from Peripheral, Wrist, Left Updated: 06/25/22 1029     Blood Culture, Routine Gram stain dudley bottle: Gram positive cocci      Results called to and read back by: Arnulfo Herrera RN. 06/22/2022  17:46      Gram stain aer bottle: Gram positive cocci in clusters resembling Staph       Positive results previously called 06/23/2022  00:11      STAPHYLOCOCCUS EPIDERMIDIS    Blood culture [[id number]]  (Abnormal)  (Susceptibility) Collected: 06/20/22 1651    Order Status: Completed Specimen: Blood from Peripheral, Hand, Left Updated: 06/25/22 1023     Blood Culture, Routine Gram stain dudley bottle: Gram positive cocci in clusters resembling Staph      Results called to and read back by:Tara López RN 06/21/2022  22:16      STAPHYLOCOCCUS EPIDERMIDIS             I have reviewed all pertinent labs within the past 24 hours.    Estimated Creatinine Clearance: 60.4 mL/min (A) (based on SCr of 2 mg/dL (H)).    Diagnostic Results:  I have reviewed and interpreted all pertinent imaging results/findings within the past 24 hours.

## 2022-06-30 NOTE — ASSESSMENT & PLAN NOTE
-S/P DT HM3 with MVR plus Protek Duo for RV support 6/29 (Grecia)  -CTS primary  -LVAD speed 5000 with flows 4.1, RVAD speed 5100 with flows 3.1  -Requiring minimal Pressor support with Epi 0.06. On  2.5 and Marcella 5 ppm. Cardene prn to keep MAP 75-80  -Post op antibiotic per CTS (h/o Staph epi bacteremia, cleareed by ID for surgery with rec to continue antibiotic coverage for 2 weeks post op)  -Chest closure today    Procedure: Device Interrogation Including analysis of device parameters  Current Settings: Ventricular Assist Device  Review of device function is stable    TXP LVAD INTERROGATIONS 6/30/2022 6/30/2022 6/30/2022 6/30/2022 6/30/2022 6/30/2022 6/30/2022   Type HeartMate3 HeartMate3 HeartMate3 HeartMate3 HeartMate3 HeartMate3 HeartMate3   Flow 4.1 3.9 3.9 4 4 4.1 3.8   Speed 5000 5000 5000 5000 5000 5000 5000   PI 4.6 3.9 5.1 5.1 5.2 5 5.7   Power (Serna) 3.4 3.4 3.5 3.4 3.4 3.4 3.4   LSL - 4600 4600 4600 4600 4600 4600   Pulsatility - Intermittent pulse No Pulse No Pulse No Pulse No Pulse No Pulse

## 2022-06-30 NOTE — PROGRESS NOTES
[place] [facility] AICD tachy therapies ENABLED post op.  Changed to Monitor + Therapy.  Base rate reprogrammed to 40 bpm as pre VAD.

## 2022-06-30 NOTE — ASSESSMENT & PLAN NOTE
-Blood cultures 6/20 and repeat 6/21 positive for Staph Epi. One of two blood cultures from 6/23 positive for Staph (taken prior to line exchange). Repeat cultures sent 6/25 NGTD.   -IJ exchanged on 6/23, IABP exchanged 6/23  -Appreciate ID's help again:   · Continue vancomycin IV, goal 15-20  · Recommend a 14 day course of vancomycin from VAD implantation on 6/29. End date: 7/12/22  · If blood cultures 6/25 become positive then consider GUILLERMO to evaluate for ICD endocarditis.  · No absolute contraindications from ID standpoint to proceed with VAD implant on 6/29 if blood cultures remain no growth.

## 2022-06-30 NOTE — PT/OT/SLP DISCHARGE
Occupational Therapy Discharge Summary    Kevan Queen  MRN: [id number]   Principal Problem: Acute on chronic combined systolic and diastolic heart failure, NYHA class 4      Patient Discharged from acute Occupational Therapy on 6/30 2* LVAD placement,  chest remains open and pt is intubated and sedated.  Remains eval when pt is medically stable and new orders are placed.     Assessment:      Patient was discharged unexpectedly.  Information required to complete an accurate discharge summary is unknown.  Refer to therapy initial evaluation and last progress note for initial and most recent functional status and goal achievement.  Recommendations made may be found in medical record.    Objective:     GOALS:   Multidisciplinary Problems     Occupational Therapy Goals     Not on file                Reasons for Discontinuation of Therapy Services  Transfer to alternate level of care.      Plan:     Patient Discharged to: ICU    6/30/2022

## 2022-06-30 NOTE — PROGRESS NOTES
[facility] - Surgical Intensive Care  Critical Care - Surgery  Progress Note    Patient Name: Kevan Queen  MRN: [id number]  Admission Date: 6/13/2022  Hospital Length of Stay: 17 days  Code Status: Prior  Attending Provider: Luis F Paige MD  Primary Care Provider: ORALIA Cline   Principal Problem: Acute on chronic combined systolic and diastolic heart failure, NYHA class 4    Subjective:     Hospital/ICU Course:  No notes on file    Interval History/Significant Events: Patient seen and examined this morning. Remains intubated, sedated, open chest. RVAD, LVAD speeds, flows stable. Remains on epi 0.06, dobutamine 2.5, lasix gtt. Received 1u pRBC, 1u FFP, and 500cc albumin overnight.     Follow-up For: Procedure(s) (LRB):  INSERTION-LEFT VENTRICULAR ASSIST DEVICE (N/A)  INSERTION, RVAD (N/A)  VALVULOPLASTY, MITRAL VALVE  REMOVAL, INTRA-AORTIC BALLOON PUMP (N/A)    Post-Operative Day: 1 Day Post-Op    Objective:     Vital Signs (Most Recent):  Temp: 98.2 °F (36.8 °C) (06/30/22 0600)  Pulse: 109 (06/30/22 0630)  Resp: (!) 22 (06/30/22 0531)  BP: 109/61 (06/29/22 0701)  SpO2: 100 % (06/30/22 0630)   Vital Signs (24h Range):  Temp:  [98.2 °F (36.8 °C)-100 °F (37.8 °C)] 98.2 °F (36.8 °C)  Pulse:  [] 109  Resp:  [18-22] 22  SpO2:  [99 %-100 %] 100 %  Arterial Line BP: ()/(66-81) 84/71     Weight: 93.7 kg (206 lb 9.1 oz)  Body mass index is 25.82 kg/m².      Intake/Output Summary (Last 24 hours) at 6/30/2022 0717  Last data filed at 6/30/2022 0600  Gross per 24 hour   Intake 5194.86 ml   Output 5391 ml   Net -196.14 ml       Physical Exam  Vitals reviewed.   Constitutional:       Comments: Intubated, sedated    HENT:      Head: Normocephalic and atraumatic.      Nose: Nose normal.      Mouth/Throat:      Mouth: Mucous membranes are moist.   Neck:      Comments: RVAD cannulation to [facility]  RVAD speed 5100 correlating to 3L flow  Cardiovascular:      Rate and Rhythm: Regular rhythm. Tachycardia present.       Pulses: Normal pulses.      Comments: Open chest with ioban in place  Chest tubes in place with minimal sanguinous output  LVAD speed: 5000 flow: 4L     Pulmonary:      Comments: Intubated, mechanically ventilated  Abdominal:      General: Abdomen is flat. There is no distension.      Palpations: Abdomen is soft.   Genitourinary:     Comments: melendez  Musculoskeletal:      Comments: IABP site at left fem with dressing in place   Skin:     General: Skin is warm and dry.       Vents:  Vent Mode: A/C (06/30/22 0513)  Ventilator Initiated: Yes (06/29/22 1527)  Set Rate: 18 BPM (06/30/22 0513)  Vt Set: 580 mL (06/30/22 0513)  PEEP/CPAP: 5 cmH20 (06/30/22 0513)  Oxygen Concentration (%): 50 (06/30/22 0630)  Peak Airway Pressure: 24 cmH2O (06/30/22 0513)  Plateau Pressure: 19 cmH20 (06/30/22 0513)  Total Ve: 11.9 mL (06/30/22 0513)  Negative Inspiratory Force (cm H2O): 0 (06/30/22 0513)  F/VT Ratio<105 (RSBI): (!) 34.78 (06/29/22 1545)    Lines/Drains/Airways       Central Venous Catheter Line  Duration              Introducer with Double Lumen 06/29/22 1123 <1 day    Percutaneous Central Line Insertion/Assessment - Triple Lumen  06/29/22 1200 left internal jugular <1 day              Drain  Duration                  Chest Tube 06/29/22 1425 1 Left Pleural 32 Fr. <1 day         Chest Tube 06/29/22 1425 2 Right Pleural 32 Fr. <1 day         NG/OG Tube 06/29/22 1630 orogastric 18 Fr. Center mouth <1 day         Urethral Catheter 06/29/22 0840 Straight-tip;Temperature probe;Non-latex 14 Fr. <1 day              Airway  Duration                  Airway - Non-Surgical 06/29/22 0830 <1 day              Arterial Line  Duration             Arterial Line 06/29/22 0832 Left Brachial <1 day              Line  Duration                  VAD 06/29/22 1115 Left ventricular assist device HeartMate 3 <1 day         VAD 06/29/22 1415 Right ventricular assist device <1 day              Peripheral Intravenous Line  Duration                   Peripheral IV - Single Lumen 06/29/22 1330 18 G Right Antecubital <1 day         Peripheral IV - Single Lumen 06/29/22 2200 20 G Left;Posterior Hand <1 day         Peripheral IV - Single Lumen 06/29/22 2220 20 G Posterior;Right Hand <1 day                    Significant Labs:    CBC/Anemia Profile:  Recent Labs   Lab 06/29/22 2013 06/29/22  2110 06/30/22  0020 06/30/22  0111 06/30/22  0400 06/30/22  0510   WBC 20.81*  --  18.60*  --  18.91*  --    HGB 8.2*  --  8.4*  --  9.1*  --    HCT 24.5*   < > 25.3* 26* 27.0* 28*     --  275  --  247  --    MCV 86  --  89  --  89  --    RDW 14.9*  --  15.0*  --  14.7*  --     < > = values in this interval not displayed.        Chemistries:  Recent Labs   Lab 06/29/22  0214 06/29/22  1539 06/29/22 2013 06/30/22  0020 06/30/22  0400   *   < > 133* 133* 136   K 3.8   < > 5.3* 5.3* 4.5   CL 91*   < > 98 100 101   CO2 28   < > 23 20* 24   BUN 51*   < > 48* 50* 48*   CREATININE 2.0*   < > 2.0* 2.1* 2.0*   CALCIUM 10.0   < > 9.6 9.4 9.6   ALBUMIN 3.5  --   --   --   --    PROT 8.2  --   --   --   --    BILITOT 0.8  --   --   --   --    ALKPHOS 154*  --   --   --   --    ALT 27  --   --   --   --    AST 32  --   --   --   --    MG  --    < > 2.5 2.3 2.4   PHOS  --    < > 3.3 4.3 4.5    < > = values in this interval not displayed.       All pertinent labs within the past 24 hours have been reviewed.    Significant Imaging:  I have reviewed all pertinent imaging results/findings within the past 24 hours.    Assessment/Plan:     * Acute on chronic combined systolic and diastolic heart failure, NYHA class 4  Kevan Queen is a 37yoM with a history of polysubstance abuse who presented to the hospital with decompensated heart failure. He underwent LVAD, RVAD and MVr on 6/29/22. He is admitted to the ICU post-operatively.       Neuro/Psych:   -- Sedation: Propofol.   -- Pain: PRN fentanyl             Cards:   -- s/p LVAD, RVAD insertion and MVr   -- arrives on epi 0.06,  dobutamine 2.5 for vasopressor support  -- OPEN CHEST   -- MAP goal 75-85  -- Cardene for hypertension      Pulm:   -- Goal O2 sat > 90%  -- ABG PRN  -- keep intubated  -- weaned to minimal ventilatory support  -- iNO2 @ 5ppm  -- daily CXR      Renal:  -- Keep melendez for strict I/O  -- Trend BUN/Cr   -- 2L intra-operative UOP  -- lasix gtt weaned overnight  -- net negative 200mL over last 24hrs      FEN / GI:   -- Replace lytes as needed  -- Nutrition: NPO  -- GI ppx: famotidine  -- Bowel reg: miralax      ID:   -- Tm: afebrile; WBC stable  -- open chest antibiotics  -- pharmacy to dose vancomycin      Heme/Onc:   -- H/H stable   -- Daily CBC  -- no intra-op product administered  -- 2u autologous  -- 1u pRBC, 1u FFP, 500mL albumin overnight (6/29)      Endo:   -- BG goal 140-180  -- Insulin gtt      PPx:   Feeding: NPO  Analgesia/Sedation: Propofol / PRN Fentanyl  Thromboembolic prevention: SCDs  HOB >30: Yes  Stress Ulcer ppx: famotidine  Glucose control: Critical care goal 140-180 g/dl, ISS     Lines/Drains/Airway: CVC RIJ, Melendez, Chest tubes x 2, L brachial A-line; RIJ RVAD; LVAD; OPEN CHEST      Dispo/Code Status/Palliative:   -- SICU / Full Code.            Michael Coronado MD  Critical Care - Surgery  [facility] - [facility] Intensive [facility]

## 2022-06-30 NOTE — TRANSFER OF CARE
"Anesthesia Transfer of Care Note    Patient: Kevan Queen    Procedure(s) Performed: Procedure(s) (LRB):  CLOSURE, WOUND, STERNUM (N/A)  INSERTION-RIGHT VENTRICULAR ASSIST DEVICE (Right)  APPLICATION, WOUND VAC (N/A)  IRRIGATION, MEDIASTINUM    Patient location: ICU    Anesthesia Type: general    Transport from OR: Transported from OR intubated on 100% O2 by AMBU with assisted ventilation    Post pain: adequate analgesia    Post assessment: no apparent anesthetic complications and tolerated procedure well    Post vital signs: stable    Level of consciousness: sedated    Nausea/Vomiting: no nausea/vomiting    Complications: none    Transfer of care protocol was followed      Last vitals:   Visit Vitals  /61   Pulse (!) 126   Temp (!) 38.2 °C (100.7 °F) (Oral)   Resp (!) 22   Ht 6' 3" (1.905 m)   Wt 93.7 kg (206 lb 9.1 oz)   SpO2 100%   BMI 25.82 kg/m²     "

## 2022-06-30 NOTE — SUBJECTIVE & OBJECTIVE
Interval History/Significant Events: Patient seen and examined this morning. Remains intubated, sedated, open chest. RVAD, LVAD speeds, flows stable. Remains on epi 0.06, dobutamine 2.5, lasix gtt. Received 1u pRBC, 1u FFP, and 500cc albumin overnight.     Follow-up For: Procedure(s) (LRB):  INSERTION-LEFT VENTRICULAR ASSIST DEVICE (N/A)  INSERTION, RVAD (N/A)  VALVULOPLASTY, MITRAL VALVE  REMOVAL, INTRA-AORTIC BALLOON PUMP (N/A)    Post-Operative Day: 1 Day Post-Op    Objective:     Vital Signs (Most Recent):  Temp: 98.2 °F (36.8 °C) (06/30/22 0600)  Pulse: 109 (06/30/22 0630)  Resp: (!) 22 (06/30/22 0531)  BP: 109/61 (06/29/22 0701)  SpO2: 100 % (06/30/22 0630)   Vital Signs (24h Range):  Temp:  [98.2 °F (36.8 °C)-100 °F (37.8 °C)] 98.2 °F (36.8 °C)  Pulse:  [] 109  Resp:  [18-22] 22  SpO2:  [99 %-100 %] 100 %  Arterial Line BP: ()/(66-81) 84/71     Weight: 93.7 kg (206 lb 9.1 oz)  Body mass index is 25.82 kg/m².      Intake/Output Summary (Last 24 hours) at 6/30/2022 0717  Last data filed at 6/30/2022 0600  Gross per 24 hour   Intake 5194.86 ml   Output 5391 ml   Net -196.14 ml       Physical Exam  Vitals reviewed.   Constitutional:       Comments: Intubated, sedated    HENT:      Head: Normocephalic and atraumatic.      Nose: Nose normal.      Mouth/Throat:      Mouth: Mucous membranes are moist.   Neck:      Comments: RVAD cannulation to RIJ  RVAD speed 5100 correlating to 3L flow  Cardiovascular:      Rate and Rhythm: Regular rhythm. Tachycardia present.      Pulses: Normal pulses.      Comments: Open chest with ioban in place  Chest tubes in place with minimal sanguinous output  LVAD speed: 5000 flow: 4L     Pulmonary:      Comments: Intubated, mechanically ventilated  Abdominal:      General: Abdomen is flat. There is no distension.      Palpations: Abdomen is soft.   Genitourinary:     Comments: melendez  Musculoskeletal:      Comments: IABP site at left fem with dressing in place   Skin:      General: Skin is warm and dry.       Vents:  Vent Mode: A/C (06/30/22 0513)  Ventilator Initiated: Yes (06/29/22 1527)  Set Rate: 18 BPM (06/30/22 0513)  Vt Set: 580 mL (06/30/22 0513)  PEEP/CPAP: 5 cmH20 (06/30/22 0513)  Oxygen Concentration (%): 50 (06/30/22 0630)  Peak Airway Pressure: 24 cmH2O (06/30/22 0513)  Plateau Pressure: 19 cmH20 (06/30/22 0513)  Total Ve: 11.9 mL (06/30/22 0513)  Negative Inspiratory Force (cm H2O): 0 (06/30/22 0513)  F/VT Ratio<105 (RSBI): (!) 34.78 (06/29/22 1545)    Lines/Drains/Airways       Central Venous Catheter Line  Duration              Introducer with Double Lumen 06/29/22 1123 <1 day    Percutaneous Central Line Insertion/Assessment - Triple Lumen  06/29/22 1200 left internal jugular <1 day              Drain  Duration                  Chest Tube 06/29/22 1425 1 Left Pleural 32 Fr. <1 day         Chest Tube 06/29/22 1425 2 Right Pleural 32 Fr. <1 day         NG/OG Tube 06/29/22 1630 orogastric 18 Fr. Center mouth <1 day         Urethral Catheter 06/29/22 0840 Straight-tip;Temperature probe;Non-latex 14 Fr. <1 day              Airway  Duration                  Airway - Non-Surgical 06/29/22 0830 <1 day              Arterial Line  Duration             Arterial Line 06/29/22 0832 Left Brachial <1 day              Line  Duration                  VAD 06/29/22 1115 Left ventricular assist device HeartMate 3 <1 day         VAD 06/29/22 1415 Right ventricular assist device <1 day              Peripheral Intravenous Line  Duration                  Peripheral IV - Single Lumen 06/29/22 1330 18 G Right Antecubital <1 day         Peripheral IV - Single Lumen 06/29/22 2200 20 G Left;Posterior Hand <1 day         Peripheral IV - Single Lumen 06/29/22 2220 20 G Posterior;Right Hand <1 day                    Significant Labs:    CBC/Anemia Profile:  Recent Labs   Lab 06/29/22  2013 06/29/22  2110 06/30/22  0020 06/30/22  0111 06/30/22  0400 06/30/22  0510   WBC 20.81*  --  18.60*  --  18.91*   --    HGB 8.2*  --  8.4*  --  9.1*  --    HCT 24.5*   < > 25.3* 26* 27.0* 28*     --  275  --  247  --    MCV 86  --  89  --  89  --    RDW 14.9*  --  15.0*  --  14.7*  --     < > = values in this interval not displayed.        Chemistries:  Recent Labs   Lab 06/29/22  0214 06/29/22  1539 06/29/22 2013 06/30/22  0020 06/30/22  0400   *   < > 133* 133* 136   K 3.8   < > 5.3* 5.3* 4.5   CL 91*   < > 98 100 101   CO2 28   < > 23 20* 24   BUN 51*   < > 48* 50* 48*   CREATININE 2.0*   < > 2.0* 2.1* 2.0*   CALCIUM 10.0   < > 9.6 9.4 9.6   ALBUMIN 3.5  --   --   --   --    PROT 8.2  --   --   --   --    BILITOT 0.8  --   --   --   --    ALKPHOS 154*  --   --   --   --    ALT 27  --   --   --   --    AST 32  --   --   --   --    MG  --    < > 2.5 2.3 2.4   PHOS  --    < > 3.3 4.3 4.5    < > = values in this interval not displayed.       All pertinent labs within the past 24 hours have been reviewed.    Significant Imaging:  I have reviewed all pertinent imaging results/findings within the past 24 hours.

## 2022-06-30 NOTE — CARE UPDATE
Mr. Queen returns to the SICU from the OR following sternal closure. In the OR he received 200mL of crystalloid and no other blood product. Urine output during the case recorded as 150mL.  Post-operative echo showed No change from previous exam. He arrives intubated and under mechanical ventilation. (2) mediastinal chest tubes to suction. Medications on admission include Epi @ .06, Dobutamine at 2.5 and an insulin gtt.               Upon admission labs were immediately drawn and sent off including CBC, CMP, Mg, Phos, and coags. An arterial blood gas was drawn showing satisfactory oxygenation and ventilation values. His ventilator was weaned accordingly.    Goals for POD 0 are to monitor respiratory status w/ possible extubation tomorrow. Maintain ionotropic support. ABG PRN. MAP > 65, Syst < 140. Main RVAD/LVAD settings.

## 2022-06-30 NOTE — PROGRESS NOTES
Interdisciplinary Rounds Report:   Attended interdisciplinary rounds with the [facility]/CTS services including the LVAD Coordinators, social workers, cardiologists, surgeons,  PT/OT/Speech, dietician, and unit charge nurses. Discussed patient status, plan of care, goals of care, including DC date, and post discharge needs. Plan of care will be discussed with the patient and/or family per the physician while rounding on the floor. This is a recurring meeting that is medically and socially necessary to collaborate with the interdisciplinary team to assist patient needs and safe discharge.

## 2022-06-30 NOTE — CARE UPDATE
Pt brought back to [id number] post-op and placed back on ventilator on documented settings. Pt to remain intubated. AMBU bag at bedside.  RN at bedside.

## 2022-06-30 NOTE — PT/OT/SLP PROGRESS
Physical Therapy      Patient Name:  Kevan Queen   MRN:  [id number]    Patient not seen today secondary to  (pt not seen due to chest being open. pt is intubated and sedated.). Will follow-up at a later date.    6/30/2022  .

## 2022-06-30 NOTE — PROGRESS NOTES
Perfusion Standby Note:      06/30/2022  Perfusionist:  John Alaniz  [facility]  Surgeon(s) and Role:     * Luis F Paige MD - Primary  Anesthesiologist:  No responsible provider has been recorded for the case.    Past Medical History:   Diagnosis Date    Arthritis     Cardiomyopathy     CHF (congestive heart failure) 10/01/2020    Diabetes mellitus     Dilated cardiomyopathy 10/26/2020    Drug abuse 10/2020    Hyperosmolar hyperglycemic state (HHS) 5/25/2022    ICD (implantable cardioverter-defibrillator) in place 10/26/2020    Muscle cramping 6/15/2022    Renal disorder          Perfusion department standby was requested for this case, utilizing either a full cardiopulmonary machine or ECMO pump.      All pre-op checklists were completed, all equipment was available, as were cannulae and other disposables.  A TAVR instrument tray was also verified as available, per the checklist, for any case requiring ECMO standby.    I fully participated in the briefing and time-out for this procedure.  I was present in the room for all critical portions of this case during which mechanical circulatory support could be required.  Please see cardiopulmonary bypass record for details.  There were no complications.    1:52 PM

## 2022-06-30 NOTE — PROGRESS NOTES
Therapy with Vancomycin complete and/or consult discontinued by provider. Pharmacy will sign off, please re-consult as needed.          Thank you,  Aundrea Daniel, PharmD.

## 2022-06-30 NOTE — PROGRESS NOTES
[facility] - Surgical Intensive [facility]  Heart Transplant  Progress Note    Patient Name: Kevan Queen  MRN: [id number]  Admission Date: 6/13/2022  Hospital Length of Stay: 17 days  Attending Physician: Luis F Paige MD  Primary Care Provider: ORALIA Cline  Principal Problem:Acute on chronic combined systolic and diastolic heart failure, NYHA class 4    Subjective:     **Interval History: POD #1 s/p DT HM3 with MVR plus ProTek Duo for RV support. Currently intubated and sedated. Minimal pressor requirements. Lasix decreased overnight to 5 mg/hr 2/2 brisk UOP and 1 unit PC's and FFP overnight. UOP now 125-150 cc/hr. sCr 2.0. LVAD at 5000 with flows 4.1 and RVAD at 5100 with flows 3.1. Chest closure today    Continuous Infusions:   sodium chloride 0.9% 5 mL/hr at 06/27/22 0055    sodium chloride 0.9% Stopped (06/30/22 0506)    DOBUTamine IV infusion (non-titrating) 2.5 mcg/kg/min (06/30/22 0701)    EPINEPHrine 0.06 mcg/kg/min (06/30/22 0701)    furosemide (LASIX) 10 mg/mL infusion (non-titrating) 5 mg/hr (06/30/22 0701)    insulin regular 1 units/mL infusion orderable (DKA) 7.9 Units/hr (06/30/22 0701)    nicardipine 5 mg/hr (06/30/22 0701)    nitric oxide gas      propofoL 50 mcg/kg/min (06/30/22 0740)     Scheduled Meds:   ceFEPime (MAXIPIME) IVPB  2 g Intravenous Q12H    famotidine (PF)  20 mg Intravenous BID    ferrous gluconate  324 mg Oral Daily with breakfast    mupirocin   Nasal BID    polyethylene glycol  17 g Per NG tube Daily    vancomycin (VANCOCIN) IVPB  1,500 mg Intravenous Once     PRN Meds:sodium chloride, sodium chloride, albumin human 5%, albuterol sulfate, bisacodyL, dextrose 10%, dextrose 10%, fentaNYL, magnesium hydroxide 400 mg/5 ml, magnesium sulfate IVPB, oxyCODONE, oxyCODONE, potassium chloride, potassium chloride, sodium chloride 0.9%, sodium chloride 0.9%, Pharmacy to dose Vancomycin consult **AND** vancomycin - pharmacy to dose    Review of patient's allergies indicates:    Allergen Reactions    Aspirin Other (See Comments)     Mr. Thacker is enrolled in Dr. Paige's Alon Trial and cannot have any aspirin and/or aspirin-containing products. DO NOT cancel any orders for INV Aspirin 100 mg/Placebo. If you have questions, please contact Isabel @ [phone], [phone],[email or web address], secure chat or MS Teams message.    Bumex [bumetanide] Hives    Lactose Diarrhea     Other reaction(s): Abdominal distension, gaseous    Torsemide Hives     Objective:     Vital Signs (Most Recent):  Temp: 98.2 °F (36.8 °C) (06/30/22 0600)  Pulse: (!) 112 (06/30/22 0830)  Resp: (!) 22 (06/30/22 0531)  BP: 109/61 (06/29/22 0701)  SpO2: 100 % (06/30/22 0830)   Vital Signs (24h Range):  Temp:  [98.2 °F (36.8 °C)-100 °F (37.8 °C)] 98.2 °F (36.8 °C)  Pulse:  [] 112  Resp:  [20-22] 22  SpO2:  [100 %] 100 %  Arterial Line BP: ()/(66-81) 80/68     Patient Vitals for the past 72 hrs (Last 3 readings):   Weight   06/29/22 0200 93.7 kg (206 lb 9.1 oz)   06/28/22 0756 93.4 kg (206 lb)     Body mass index is 25.82 kg/m².      Intake/Output Summary (Last 24 hours) at 6/30/2022 0848  Last data filed at 6/30/2022 0701  Gross per 24 hour   Intake 5249.43 ml   Output 5356 ml   Net -106.57 ml       Hemodynamic Parameters:       Telemetry: ST    Physical Exam  Constitutional:       Comments: Intubated and sedated   HENT:      Head: Normocephalic and atraumatic.   Eyes:      Conjunctiva/sclera: Conjunctivae normal.      Pupils: Pupils are equal, round, and reactive to light.   Neck:      Comments: LIJ TLC, RIJ ProTek Duo  Cardiovascular:      Rate and Rhythm: Regular rhythm. Tachycardia present.      Comments: Smooth VAD hum  Pulmonary:      Breath sounds: Normal breath sounds.      Comments: Intubated on ventilator  Abdominal:      General: Bowel sounds are normal.      Palpations: Abdomen is soft.   Musculoskeletal:         General: No swelling.      Cervical back: Neck supple.   Skin:     General:  Skin is warm and dry.      Capillary Refill: Capillary refill takes 2 to 3 seconds.   Neurological:      Comments: Intubated and sedated       Significant Labs:  CBC:  Recent Labs   Lab 06/30/22  0020 06/30/22  0111 06/30/22  0400 06/30/22  0510 06/30/22  0806   WBC 18.60*  --  18.91*  --  20.51*   RBC 2.85*  --  3.02*  --  3.01*   HGB 8.4*  --  9.1*  --  9.0*   HCT 25.3*   < > 27.0* 28* 26.5*     --  247  --  245   MCV 89  --  89  --  88   MCH 29.5  --  30.1  --  29.9   MCHC 33.2  --  33.7  --  34.0    < > = values in this interval not displayed.     BNP:  Recent Labs   Lab 06/28/22  0921   *     CMP:  Recent Labs   Lab 06/27/22  0308 06/27/22  1510 06/28/22  0329 06/28/22  1803 06/29/22  0214 06/29/22  1539 06/29/22  2013 06/30/22  0020 06/30/22  0400   *   < > 167*   < > 174*   < > 191* 178* 163*   CALCIUM 10.4   < > 10.1   < > 10.0   < > 9.6 9.4 9.6   ALBUMIN 3.6  --  3.5  --  3.5  --   --   --   --    PROT 8.6*  --  8.2  --  8.2  --   --   --   --    *   < > 131*   < > 130*   < > 133* 133* 136   K 3.6   < > 4.1   < > 3.8   < > 5.3* 5.3* 4.5   CO2 27   < > 20*   < > 28   < > 23 20* 24   CL 89*   < > 94*   < > 91*   < > 98 100 101   BUN 51*   < > 57*   < > 51*   < > 48* 50* 48*   CREATININE 2.1*   < > 2.1*   < > 2.0*   < > 2.0* 2.1* 2.0*   ALKPHOS 151*  --  153*  --  154*  --   --   --   --    ALT 27  --  27  --  27  --   --   --   --    AST 28  --  35  --  32  --   --   --   --    BILITOT 0.9  --  0.8  --  0.8  --   --   --   --     < > = values in this interval not displayed.      Coagulation:   Recent Labs   Lab 06/30/22  0020 06/30/22  0400 06/30/22  0806   INR 1.2 1.2 1.2   APTT 23.2 22.8 23.1     LDH:  Recent Labs   Lab 06/28/22  0329 06/29/22  0214 06/29/22  1809 06/30/22  0400   * 338* 789* 798*  798*     Microbiology:  Microbiology Results (last 7 days)       Procedure Component Value Units Date/Time    Blood culture [[id number]] Collected: 06/25/22 0634    Order Status:  Completed Specimen: Blood from Peripheral, Wrist, Left Updated: 06/30/22 0812     Blood Culture, Routine No growth after 5 days.    Blood culture [[id number]] Collected: 06/25/22 0633    Order Status: Completed Specimen: Blood from Peripheral, Antecubital, Right Updated: 06/30/22 0812     Blood Culture, Routine No growth after 5 days.    Blood culture [[id number]] Collected: 06/23/22 0105    Order Status: Completed Specimen: Blood from Peripheral, Antecubital, Right Updated: 06/28/22 0612     Blood Culture, Routine No growth after 5 days.    Blood culture [[id number]]  (Abnormal)  (Susceptibility) Collected: 06/23/22 0108    Order Status: Completed Specimen: Blood from Peripheral, Hand, Right Updated: 06/27/22 1116     Blood Culture, Routine Gram stain dudley bottle: Gram positive cocci in clusters resembling Staph       Results called to and read back by: Mark Pickett RN  06/24/2022        14:36      STAPHYLOCOCCUS EPIDERMIDIS    Blood culture [[id number]]  (Abnormal)  (Susceptibility) Collected: 06/21/22 2250    Order Status: Completed Specimen: Blood from Peripheral, Wrist, Left Updated: 06/26/22 1059     Blood Culture, Routine Gram stain dudley bottle: Gram positive cocci in clusters resembling Staph      Results called to and read back by: Rosa Pardo RN. 06/22/2022  23:29      Gram stain aer bottle: Gram positive cocci in clusters resembling Staph       Positive results previously called 06/23/2022  04:33      STAPHYLOCOCCUS EPIDERMIDIS    Blood culture [[id number]]  (Abnormal)  (Susceptibility) Collected: 06/21/22 2250    Order Status: Completed Specimen: Blood from Peripheral, Wrist, Left Updated: 06/25/22 1029     Blood Culture, Routine Gram stain dudley bottle: Gram positive cocci      Results called to and read back by: Arnulfo Herrera RN. 06/22/2022  17:46      Gram stain aer bottle: Gram positive cocci in clusters resembling Staph       Positive results previously called 06/23/2022  00:11      STAPHYLOCOCCUS  "EPIDERMIDIS    Blood culture [[id number]]  (Abnormal)  (Susceptibility) Collected: 06/20/22 1651    Order Status: Completed Specimen: Blood from Peripheral, Hand, Left Updated: 06/25/22 1023     Blood Culture, Routine Gram stain dudley bottle: Gram positive cocci in clusters resembling Staph      Results called to and read back by:Tara López RN 06/21/2022  22:16      STAPHYLOCOCCUS EPIDERMIDIS            I have reviewed all pertinent labs within the past 24 hours.    Estimated Creatinine Clearance: 60.4 mL/min (A) (based on SCr of 2 mg/dL (H)).    Diagnostic Results:  I have reviewed and interpreted all pertinent imaging results/findings within the past 24 hours.    Assessment and Plan:     38 yo male with NIDCM with BiV systolic heart failure, on home Milrinone at 0.375 mcg/kg/min, presented at [facility] 4/2/22 ,was not evaluated for OHT as he has recently quit smoking in April 2022 but was approved for VAD with plan to begin OHT evaluation in upcoming months if Mr Queen is stable and suitable for OHT eval (blood group A), 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 Went to ED at [facility] 6/10 but left after waiting 4 hours. Had clinic appointment with us today, but arrived to [facility] early this morning and decided to go to the ED instead. Baseline Lasix dose is 80 mg bid. Reports taking 240 mg qd past 3 days with no improvement. BNP is 1701, up from 898 on 6/2 and 49 on 5/24. sCr is 1.8 with baseline ~ 1.5-1.7. sPO2 on RA is 93%. Wife at bedside    He has been given Lasix 80 mg IVP in the ED with plan to start Lasix gtt at 20 mg/hr           * Acute on chronic combined systolic and diastolic " heart failure, NYHA class 4  - [facility]  - Approved for LVAD at [facility] on 4/2/22. Was not evaluated for OHTx as he quit smoking in April 2022  - See LVAD    Unlikely OHTx candidate with smoking history (quit April 2022)     LVAD (left ventricular assist device) present  -S/P DT HM3 with MVR plus Protek Duo for RV support 6/29 (Grecia)  -CTS primary  -LVAD speed 5000 with flows 4.1, RVAD speed 5100 with flows 3.1  -Requiring minimal Pressor support with Epi 0.06. On  2.5 and Marcella 5 ppm. Cardene prn to keep MAP 75-80  -Post op antibiotic per CTS (h/o Staph epi bacteremia, cleareed by ID for surgery with rec to continue antibiotic coverage for 2 weeks post op)  -Chest closure today    Procedure: Device Interrogation Including analysis of device parameters  Current Settings: Ventricular Assist Device  Review of device function is stable    TXP LVAD INTERROGATIONS 6/30/2022 6/30/2022 6/30/2022 6/30/2022 6/30/2022 6/30/2022 6/30/2022   Type HeartMate3 HeartMate3 HeartMate3 HeartMate3 HeartMate3 HeartMate3 HeartMate3   Flow 4.1 3.9 3.9 4 4 4.1 3.8   Speed 5000 5000 5000 5000 5000 5000 5000   PI 4.6 3.9 5.1 5.1 5.2 5 5.7   Power (Serna) 3.4 3.4 3.5 3.4 3.4 3.4 3.4   LSL - 4600 4600 4600 4600 4600 4600   Pulsatility - Intermittent pulse No Pulse No Pulse No Pulse No Pulse No Pulse       Staphylococcus epidermidis bacteremia  -Blood cultures 6/20 and repeat 6/21 positive for Staph Epi. One of two blood cultures from 6/23 positive for Staph (taken prior to line exchange). Repeat cultures sent 6/25 TD.   -IJ exchanged on 6/23, IABP exchanged 6/23  -Appreciate ID's help again:   · Continue vancomycin IV, goal 15-20  · Recommend a 14 day course of vancomycin from VAD implantation on 6/29. End date: 7/12/22  · If blood cultures 6/25 become positive then consider GUILLERMO to evaluate for ICD endocarditis.  · No absolute contraindications from ID standpoint to proceed with VAD implant on 6/29 if blood cultures remain no growth.             Uncontrolled diabetes mellitus  -Admitted 5/24-5/27 with hyperglycemia hyperosmolar syndrome  -Hgb A1C 9.2 on 5/20/22  -Endocrine following, on insulin gtt    CKD (chronic kidney disease) stage 3, GFR 30-59 ml/min  - Admit sCr 1.8, baseline ~ 1.5-1.7  - POD #1 sCr 2.0  - -150 cc/hr on Lasix 5 mg/hr    Cardiogenic shock  -See LVAD      Uninterrupted Critical Care/Counseling Time (not including procedures): 60 minutes      Alana Cain, LUCÍA  Heart Transplant  [facility] - Surgical Intensive Care

## 2022-06-30 NOTE — ASSESSMENT & PLAN NOTE
- [facility]  - Approved for LVAD at [facility] on 4/2/22. Was not evaluated for OHTx as he quit smoking in April 2022  - See LVAD    Unlikely OHTx candidate with smoking history (quit April 2022)

## 2022-06-30 NOTE — PROGRESS NOTES
Reprogrammed patient's Mayvenn AICD tachy mode to MONITOR ONLY for chest closure this morning.  Please notify the device clinic upon patient's return to his room post op to enable therapies.

## 2022-06-30 NOTE — PROGRESS NOTES
Pharmacokinetic Assessment Follow Up: IV Vancomycin    Vancomycin Regimen Assessment/Plan:    - Random vancomycin level resulted below goal as 13 mg/dl, goal 15-20 mg/dl.    - Patient with ROC on CKD, SCr 2 mg/dl. Baseline ~1.5-1.7. Will pulse dose for now in the setting of elevated SCr.   - Administer vancomycin 1500 mg x 1 dose.   - A random level is ordered with AM labs tomorrow to assess clearance. Pharmacy to re-dose depending on level and renal function.     Drug levels (last 3 results):  Recent Labs   Lab Result Units 06/27/22  2258 06/28/22  1803 06/30/22  0400   Vancomycin, Random ug/mL  --  15.9 13.0   Vancomycin-Trough ug/mL 21.3  --   --        Pharmacy will continue to follow and monitor vancomycin.    Please contact pharmacy at extension [phone] for questions regarding this assessment.    Thank you for the consult,   Layla Rich, PharmD, BCCCP       Patient brief summary:  Kevan Queen is a 37 y.o. male initiated on antimicrobial therapy with IV Vancomycin for treatment of bacteremia    The patient's current regimen is pulse dosing in the setting of elevated SCr.     Actual Body Weight:   93.7 kg    Renal Function:   Estimated Creatinine Clearance: 60.4 mL/min (A) (based on SCr of 2 mg/dL (H)).     Dialysis Method (if applicable):  N/A

## 2022-06-30 NOTE — PROGRESS NOTES
Update    Pt out of room for washout and closure. Family members not in room or waiting area. Per VAD coordinator PHILIPP Turpin, family requesting food assistance and additional night(s) in . Per RN pt's SO and pt's mother are both present at hospital at this time and only one person can stay in pt's room. Provided RN with 3 food vouchers for family and SW card if family has questions. Explained to RN that additional  nights are not possible. Providing ongoing psychosocial and counseling support, education, resources, assistance and discharge planning as indicated. Following and available.

## 2022-06-30 NOTE — PLAN OF CARE
[facility] - [facility] Intensive [facility]  Initial Discharge Assessment         Being followed by [facility]/CM/TREY

## 2022-06-30 NOTE — PROGRESS NOTES
Dr. Paige informed pt CVP 15, UOP 125mL/hr. Order received for lasix IVP push 20mg and increase gtt to 10mg/hr.

## 2022-07-01 LAB
ALBUMIN SERPL BCP-MCNC: 3.4 G/DL (ref 3.5–5.2)
ALBUMIN SERPL BCP-MCNC: 3.5 G/DL (ref 3.5–5.2)
ALBUMIN SERPL BCP-MCNC: 3.6 G/DL (ref 3.5–5.2)
ALBUMIN SERPL BCP-MCNC: 3.7 G/DL (ref 3.5–5.2)
ALLENS TEST: ABNORMAL
ALP SERPL-CCNC: 82 U/L (ref 55–135)
ALP SERPL-CCNC: 86 U/L (ref 55–135)
ALP SERPL-CCNC: 86 U/L (ref 55–135)
ALP SERPL-CCNC: 87 U/L (ref 55–135)
ALP SERPL-CCNC: 90 U/L (ref 55–135)
ALP SERPL-CCNC: 92 U/L (ref 55–135)
ALT SERPL W/O P-5'-P-CCNC: 121 U/L (ref 10–44)
ALT SERPL W/O P-5'-P-CCNC: 290 U/L (ref 10–44)
ALT SERPL W/O P-5'-P-CCNC: 42 U/L (ref 10–44)
ALT SERPL W/O P-5'-P-CCNC: 469 U/L (ref 10–44)
ALT SERPL W/O P-5'-P-CCNC: 58 U/L (ref 10–44)
ALT SERPL W/O P-5'-P-CCNC: 58 U/L (ref 10–44)
ALT SERPL W/O P-5'-P-CCNC: 621 U/L (ref 10–44)
ALT SERPL W/O P-5'-P-CCNC: 86 U/L (ref 10–44)
ANION GAP SERPL CALC-SCNC: 11 MMOL/L (ref 8–16)
ANION GAP SERPL CALC-SCNC: 12 MMOL/L (ref 8–16)
ANION GAP SERPL CALC-SCNC: 13 MMOL/L (ref 8–16)
ANION GAP SERPL CALC-SCNC: 13 MMOL/L (ref 8–16)
ANION GAP SERPL CALC-SCNC: 14 MMOL/L (ref 8–16)
ANISOCYTOSIS BLD QL SMEAR: SLIGHT
ANISOCYTOSIS BLD QL SMEAR: SLIGHT
APTT BLDCRRT: 24.5 SEC (ref 21–32)
APTT BLDCRRT: 24.9 SEC (ref 21–32)
APTT BLDCRRT: 24.9 SEC (ref 21–32)
APTT BLDCRRT: 27.4 SEC (ref 21–32)
APTT BLDCRRT: 27.9 SEC (ref 21–32)
APTT BLDCRRT: 28.5 SEC (ref 21–32)
AST SERPL-CCNC: 1126 U/L (ref 10–40)
AST SERPL-CCNC: 1469 U/L (ref 10–40)
AST SERPL-CCNC: 165 U/L (ref 10–40)
AST SERPL-CCNC: 185 U/L (ref 10–40)
AST SERPL-CCNC: 185 U/L (ref 10–40)
AST SERPL-CCNC: 237 U/L (ref 10–40)
AST SERPL-CCNC: 303 U/L (ref 10–40)
AST SERPL-CCNC: 703 U/L (ref 10–40)
BACTERIA BLD CULT: ABNORMAL
BASOPHILS # BLD AUTO: 0.04 K/UL (ref 0–0.2)
BASOPHILS # BLD AUTO: 0.05 K/UL (ref 0–0.2)
BASOPHILS NFR BLD: 0 % (ref 0–1.9)
BASOPHILS NFR BLD: 0.1 % (ref 0–1.9)
BASOPHILS NFR BLD: 0.2 % (ref 0–1.9)
BASOPHILS NFR BLD: 0.2 % (ref 0–1.9)
BILIRUB DIRECT SERPL-MCNC: 1.4 MG/DL (ref 0.1–0.3)
BILIRUB SERPL-MCNC: 2 MG/DL (ref 0.1–1)
BILIRUB SERPL-MCNC: 2.1 MG/DL (ref 0.1–1)
BILIRUB SERPL-MCNC: 2.3 MG/DL (ref 0.1–1)
BILIRUB SERPL-MCNC: 2.4 MG/DL (ref 0.1–1)
BILIRUB SERPL-MCNC: 2.4 MG/DL (ref 0.1–1)
BILIRUB SERPL-MCNC: 2.5 MG/DL (ref 0.1–1)
BLD PROD TYP BPU: NORMAL
BLOOD UNIT EXPIRATION DATE: NORMAL
BLOOD UNIT TYPE CODE: 6200
BLOOD UNIT TYPE: NORMAL
BNP SERPL-MCNC: 520 PG/ML (ref 0–99)
BUN SERPL-MCNC: 53 MG/DL (ref 6–20)
BUN SERPL-MCNC: 54 MG/DL (ref 6–20)
BUN SERPL-MCNC: 55 MG/DL (ref 6–20)
BUN SERPL-MCNC: 57 MG/DL (ref 6–20)
BUN SERPL-MCNC: 59 MG/DL (ref 6–20)
BUN SERPL-MCNC: 60 MG/DL (ref 6–20)
BUN SERPL-MCNC: 62 MG/DL (ref 6–20)
CALCIUM SERPL-MCNC: 8.8 MG/DL (ref 8.7–10.5)
CALCIUM SERPL-MCNC: 8.9 MG/DL (ref 8.7–10.5)
CALCIUM SERPL-MCNC: 8.9 MG/DL (ref 8.7–10.5)
CALCIUM SERPL-MCNC: 9 MG/DL (ref 8.7–10.5)
CALCIUM SERPL-MCNC: 9.1 MG/DL (ref 8.7–10.5)
CHLORIDE SERPL-SCNC: 102 MMOL/L (ref 95–110)
CHLORIDE SERPL-SCNC: 102 MMOL/L (ref 95–110)
CHLORIDE SERPL-SCNC: 103 MMOL/L (ref 95–110)
CHLORIDE SERPL-SCNC: 104 MMOL/L (ref 95–110)
CHLORIDE SERPL-SCNC: 104 MMOL/L (ref 95–110)
CO2 SERPL-SCNC: 19 MMOL/L (ref 23–29)
CO2 SERPL-SCNC: 19 MMOL/L (ref 23–29)
CO2 SERPL-SCNC: 21 MMOL/L (ref 23–29)
CO2 SERPL-SCNC: 22 MMOL/L (ref 23–29)
CO2 SERPL-SCNC: 22 MMOL/L (ref 23–29)
CO2 SERPL-SCNC: 23 MMOL/L (ref 23–29)
CO2 SERPL-SCNC: 23 MMOL/L (ref 23–29)
CODING SYSTEM: NORMAL
CREAT SERPL-MCNC: 2.1 MG/DL (ref 0.5–1.4)
CREAT SERPL-MCNC: 2.1 MG/DL (ref 0.5–1.4)
CREAT SERPL-MCNC: 2.2 MG/DL (ref 0.5–1.4)
CREAT SERPL-MCNC: 2.2 MG/DL (ref 0.5–1.4)
CREAT SERPL-MCNC: 2.3 MG/DL (ref 0.5–1.4)
CREAT SERPL-MCNC: 2.3 MG/DL (ref 0.5–1.4)
CREAT SERPL-MCNC: 2.4 MG/DL (ref 0.5–1.4)
CRP SERPL-MCNC: 264.6 MG/L (ref 0–8.2)
DACRYOCYTES BLD QL SMEAR: ABNORMAL
DELSYS: ABNORMAL
DIFFERENTIAL METHOD: ABNORMAL
DISPENSE STATUS: NORMAL
EOSINOPHIL # BLD AUTO: 0 K/UL (ref 0–0.5)
EOSINOPHIL NFR BLD: 0 % (ref 0–8)
EOSINOPHIL NFR BLD: 0.1 % (ref 0–8)
ERYTHROCYTE [DISTWIDTH] IN BLOOD BY AUTOMATED COUNT: 14.9 % (ref 11.5–14.5)
ERYTHROCYTE [DISTWIDTH] IN BLOOD BY AUTOMATED COUNT: 15.1 % (ref 11.5–14.5)
ERYTHROCYTE [DISTWIDTH] IN BLOOD BY AUTOMATED COUNT: 15.2 % (ref 11.5–14.5)
ERYTHROCYTE [DISTWIDTH] IN BLOOD BY AUTOMATED COUNT: 15.3 % (ref 11.5–14.5)
ERYTHROCYTE [DISTWIDTH] IN BLOOD BY AUTOMATED COUNT: 15.4 % (ref 11.5–14.5)
ERYTHROCYTE [SEDIMENTATION RATE] IN BLOOD BY WESTERGREN METHOD: 18 MM/H
EST. GFR  (AFRICAN AMERICAN): 38.4 ML/MIN/1.73 M^2
EST. GFR  (AFRICAN AMERICAN): 40.4 ML/MIN/1.73 M^2
EST. GFR  (AFRICAN AMERICAN): 40.4 ML/MIN/1.73 M^2
EST. GFR  (AFRICAN AMERICAN): 42.7 ML/MIN/1.73 M^2
EST. GFR  (AFRICAN AMERICAN): 42.7 ML/MIN/1.73 M^2
EST. GFR  (AFRICAN AMERICAN): 45.1 ML/MIN/1.73 M^2
EST. GFR  (AFRICAN AMERICAN): 45.1 ML/MIN/1.73 M^2
EST. GFR  (NON AFRICAN AMERICAN): 33.2 ML/MIN/1.73 M^2
EST. GFR  (NON AFRICAN AMERICAN): 35 ML/MIN/1.73 M^2
EST. GFR  (NON AFRICAN AMERICAN): 35 ML/MIN/1.73 M^2
EST. GFR  (NON AFRICAN AMERICAN): 36.9 ML/MIN/1.73 M^2
EST. GFR  (NON AFRICAN AMERICAN): 36.9 ML/MIN/1.73 M^2
EST. GFR  (NON AFRICAN AMERICAN): 39 ML/MIN/1.73 M^2
EST. GFR  (NON AFRICAN AMERICAN): 39 ML/MIN/1.73 M^2
FIBRINOGEN PPP-MCNC: 772 MG/DL (ref 182–400)
FIO2: 40
FIO2: 40
FIO2: 50
FLOW: 4
GLUCOSE SERPL-MCNC: 115 MG/DL (ref 70–110)
GLUCOSE SERPL-MCNC: 166 MG/DL (ref 70–110)
GLUCOSE SERPL-MCNC: 168 MG/DL (ref 70–110)
GLUCOSE SERPL-MCNC: 169 MG/DL (ref 70–110)
GLUCOSE SERPL-MCNC: 196 MG/DL (ref 70–110)
GLUCOSE SERPL-MCNC: 204 MG/DL (ref 70–110)
GLUCOSE SERPL-MCNC: 223 MG/DL (ref 70–110)
HCO3 UR-SCNC: 19.6 MMOL/L (ref 24–28)
HCO3 UR-SCNC: 21 MMOL/L (ref 24–28)
HCO3 UR-SCNC: 22.3 MMOL/L (ref 24–28)
HCO3 UR-SCNC: 22.8 MMOL/L (ref 24–28)
HCO3 UR-SCNC: 22.9 MMOL/L (ref 24–28)
HCO3 UR-SCNC: 24.2 MMOL/L (ref 24–28)
HCT VFR BLD AUTO: 23.4 % (ref 40–54)
HCT VFR BLD AUTO: 23.9 % (ref 40–54)
HCT VFR BLD AUTO: 24 % (ref 40–54)
HCT VFR BLD AUTO: 24.5 % (ref 40–54)
HCT VFR BLD AUTO: 25.1 % (ref 40–54)
HCT VFR BLD AUTO: 25.9 % (ref 40–54)
HCT VFR BLD AUTO: 26.4 % (ref 40–54)
HCT VFR BLD CALC: 24 %PCV (ref 36–54)
HCT VFR BLD CALC: 25 %PCV (ref 36–54)
HCT VFR BLD CALC: 26 %PCV (ref 36–54)
HCT VFR BLD CALC: 26 %PCV (ref 36–54)
HCT VFR BLD CALC: 27 %PCV (ref 36–54)
HGB BLD-MCNC: 7.7 G/DL (ref 14–18)
HGB BLD-MCNC: 8 G/DL (ref 14–18)
HGB BLD-MCNC: 8 G/DL (ref 14–18)
HGB BLD-MCNC: 8.2 G/DL (ref 14–18)
HGB BLD-MCNC: 8.5 G/DL (ref 14–18)
HGB BLD-MCNC: 8.7 G/DL (ref 14–18)
HGB BLD-MCNC: 8.7 G/DL (ref 14–18)
HYPOCHROMIA BLD QL SMEAR: ABNORMAL
HYPOCHROMIA BLD QL SMEAR: ABNORMAL
IMM GRANULOCYTES # BLD AUTO: 0.21 K/UL (ref 0–0.04)
IMM GRANULOCYTES # BLD AUTO: 0.22 K/UL (ref 0–0.04)
IMM GRANULOCYTES # BLD AUTO: 0.26 K/UL (ref 0–0.04)
IMM GRANULOCYTES # BLD AUTO: 0.33 K/UL (ref 0–0.04)
IMM GRANULOCYTES # BLD AUTO: ABNORMAL K/UL (ref 0–0.04)
IMM GRANULOCYTES NFR BLD AUTO: 0.7 % (ref 0–0.5)
IMM GRANULOCYTES NFR BLD AUTO: 0.8 % (ref 0–0.5)
IMM GRANULOCYTES NFR BLD AUTO: 0.9 % (ref 0–0.5)
IMM GRANULOCYTES NFR BLD AUTO: 1.2 % (ref 0–0.5)
IMM GRANULOCYTES NFR BLD AUTO: ABNORMAL % (ref 0–0.5)
INR PPP: 1.1 (ref 0.8–1.2)
INR PPP: 1.2 (ref 0.8–1.2)
LACTATE SERPL-SCNC: 2.2 MMOL/L (ref 0.5–2.2)
LDH SERPL L TO P-CCNC: 1.71 MMOL/L (ref 0.36–1.25)
LDH SERPL L TO P-CCNC: 1.91 MMOL/L (ref 0.36–1.25)
LDH SERPL L TO P-CCNC: 1040 U/L (ref 110–260)
LDH SERPL L TO P-CCNC: 2.04 MMOL/L (ref 0.36–1.25)
LDH SERPL L TO P-CCNC: 2.16 MMOL/L (ref 0.36–1.25)
LDH SERPL L TO P-CCNC: 2023 U/L (ref 110–260)
LYMPHOCYTES # BLD AUTO: 0.8 K/UL (ref 1–4.8)
LYMPHOCYTES # BLD AUTO: 0.8 K/UL (ref 1–4.8)
LYMPHOCYTES # BLD AUTO: 0.9 K/UL (ref 1–4.8)
LYMPHOCYTES # BLD AUTO: 1 K/UL (ref 1–4.8)
LYMPHOCYTES # BLD AUTO: 1 K/UL (ref 1–4.8)
LYMPHOCYTES # BLD AUTO: 1.1 K/UL (ref 1–4.8)
LYMPHOCYTES NFR BLD: 2.8 % (ref 18–48)
LYMPHOCYTES NFR BLD: 3 % (ref 18–48)
LYMPHOCYTES NFR BLD: 3.1 % (ref 18–48)
LYMPHOCYTES NFR BLD: 3.1 % (ref 18–48)
LYMPHOCYTES NFR BLD: 3.5 % (ref 18–48)
LYMPHOCYTES NFR BLD: 3.7 % (ref 18–48)
LYMPHOCYTES NFR BLD: 4 % (ref 18–48)
MAGNESIUM SERPL-MCNC: 2.4 MG/DL (ref 1.6–2.6)
MAGNESIUM SERPL-MCNC: 2.6 MG/DL (ref 1.6–2.6)
MAGNESIUM SERPL-MCNC: 2.8 MG/DL (ref 1.6–2.6)
MAGNESIUM SERPL-MCNC: 3 MG/DL (ref 1.6–2.6)
MAP: 9
MCH RBC QN AUTO: 28.8 PG (ref 27–31)
MCH RBC QN AUTO: 29 PG (ref 27–31)
MCH RBC QN AUTO: 29.2 PG (ref 27–31)
MCH RBC QN AUTO: 29.3 PG (ref 27–31)
MCH RBC QN AUTO: 29.5 PG (ref 27–31)
MCH RBC QN AUTO: 29.7 PG (ref 27–31)
MCH RBC QN AUTO: 30 PG (ref 27–31)
MCHC RBC AUTO-ENTMCNC: 32.9 G/DL (ref 32–36)
MCHC RBC AUTO-ENTMCNC: 33 G/DL (ref 32–36)
MCHC RBC AUTO-ENTMCNC: 33.3 G/DL (ref 32–36)
MCHC RBC AUTO-ENTMCNC: 33.5 G/DL (ref 32–36)
MCHC RBC AUTO-ENTMCNC: 33.5 G/DL (ref 32–36)
MCHC RBC AUTO-ENTMCNC: 33.6 G/DL (ref 32–36)
MCHC RBC AUTO-ENTMCNC: 33.9 G/DL (ref 32–36)
MCV RBC AUTO: 87 FL (ref 82–98)
MCV RBC AUTO: 87 FL (ref 82–98)
MCV RBC AUTO: 88 FL (ref 82–98)
MCV RBC AUTO: 89 FL (ref 82–98)
MCV RBC AUTO: 90 FL (ref 82–98)
METHEMOGLOBIN: 0.7 % (ref 0–3)
MIN VOL: 16.3
MODE: ABNORMAL
MONOCYTES # BLD AUTO: 1.5 K/UL (ref 0.3–1)
MONOCYTES # BLD AUTO: 1.5 K/UL (ref 0.3–1)
MONOCYTES # BLD AUTO: 1.7 K/UL (ref 0.3–1)
MONOCYTES # BLD AUTO: 1.8 K/UL (ref 0.3–1)
MONOCYTES NFR BLD: 2 % (ref 4–15)
MONOCYTES NFR BLD: 5.3 % (ref 4–15)
MONOCYTES NFR BLD: 5.3 % (ref 4–15)
MONOCYTES NFR BLD: 6.1 % (ref 4–15)
MONOCYTES NFR BLD: 6.1 % (ref 4–15)
MONOCYTES NFR BLD: 6.5 % (ref 4–15)
MONOCYTES NFR BLD: 6.5 % (ref 4–15)
NEUTROPHILS # BLD AUTO: 23.5 K/UL (ref 1.8–7.7)
NEUTROPHILS # BLD AUTO: 23.8 K/UL (ref 1.8–7.7)
NEUTROPHILS # BLD AUTO: 24.8 K/UL (ref 1.8–7.7)
NEUTROPHILS # BLD AUTO: 25.3 K/UL (ref 1.8–7.7)
NEUTROPHILS # BLD AUTO: 25.7 K/UL (ref 1.8–7.7)
NEUTROPHILS # BLD AUTO: 26.4 K/UL (ref 1.8–7.7)
NEUTROPHILS NFR BLD: 88.5 % (ref 38–73)
NEUTROPHILS NFR BLD: 88.9 % (ref 38–73)
NEUTROPHILS NFR BLD: 89.4 % (ref 38–73)
NEUTROPHILS NFR BLD: 90 % (ref 38–73)
NEUTROPHILS NFR BLD: 90.1 % (ref 38–73)
NEUTROPHILS NFR BLD: 91 % (ref 38–73)
NEUTROPHILS NFR BLD: 91 % (ref 38–73)
NEUTS BAND NFR BLD MANUAL: 4 %
NRBC BLD-RTO: 0 /100 WBC
NRBC BLD-RTO: 1 /100 WBC
NRBC BLD-RTO: 1 /100 WBC
NUM UNITS TRANS PACKED RBC: NORMAL
OVALOCYTES BLD QL SMEAR: ABNORMAL
OVALOCYTES BLD QL SMEAR: ABNORMAL
PCO2 BLDA: 30.5 MMHG (ref 35–45)
PCO2 BLDA: 30.6 MMHG (ref 35–45)
PCO2 BLDA: 32.3 MMHG (ref 35–45)
PCO2 BLDA: 33.3 MMHG (ref 35–45)
PCO2 BLDA: 33.3 MMHG (ref 35–45)
PCO2 BLDA: 35.5 MMHG (ref 35–45)
PEEP: 5
PH SMN: 7.41 [PH] (ref 7.35–7.45)
PH SMN: 7.42 [PH] (ref 7.35–7.45)
PH SMN: 7.42 [PH] (ref 7.35–7.45)
PH SMN: 7.43 [PH] (ref 7.35–7.45)
PH SMN: 7.48 [PH] (ref 7.35–7.45)
PH SMN: 7.48 [PH] (ref 7.35–7.45)
PHOSPHATE SERPL-MCNC: 4.6 MG/DL (ref 2.7–4.5)
PHOSPHATE SERPL-MCNC: 4.8 MG/DL (ref 2.7–4.5)
PHOSPHATE SERPL-MCNC: 4.9 MG/DL (ref 2.7–4.5)
PHOSPHATE SERPL-MCNC: 5.3 MG/DL (ref 2.7–4.5)
PHOSPHATE SERPL-MCNC: 5.4 MG/DL (ref 2.7–4.5)
PLATELET # BLD AUTO: 187 K/UL (ref 150–450)
PLATELET # BLD AUTO: 200 K/UL (ref 150–450)
PLATELET # BLD AUTO: 202 K/UL (ref 150–450)
PLATELET # BLD AUTO: 206 K/UL (ref 150–450)
PLATELET # BLD AUTO: 216 K/UL (ref 150–450)
PLATELET # BLD AUTO: 222 K/UL (ref 150–450)
PLATELET # BLD AUTO: 224 K/UL (ref 150–450)
PMV BLD AUTO: 10.1 FL (ref 9.2–12.9)
PMV BLD AUTO: 10.3 FL (ref 9.2–12.9)
PMV BLD AUTO: 10.4 FL (ref 9.2–12.9)
PMV BLD AUTO: 9.8 FL (ref 9.2–12.9)
PMV BLD AUTO: 9.9 FL (ref 9.2–12.9)
PO2 BLDA: 114 MMHG (ref 80–100)
PO2 BLDA: 147 MMHG (ref 80–100)
PO2 BLDA: 194 MMHG (ref 80–100)
PO2 BLDA: 202 MMHG (ref 80–100)
PO2 BLDA: 97 MMHG (ref 80–100)
PO2 BLDA: 99 MMHG (ref 80–100)
POC BE: -1 MMOL/L
POC BE: -2 MMOL/L
POC BE: -2 MMOL/L
POC BE: -4 MMOL/L
POC BE: -5 MMOL/L
POC BE: 1 MMOL/L
POC IONIZED CALCIUM: 1.06 MMOL/L (ref 1.06–1.42)
POC IONIZED CALCIUM: 1.09 MMOL/L (ref 1.06–1.42)
POC IONIZED CALCIUM: 1.11 MMOL/L (ref 1.06–1.42)
POC IONIZED CALCIUM: 1.13 MMOL/L (ref 1.06–1.42)
POC IONIZED CALCIUM: 1.14 MMOL/L (ref 1.06–1.42)
POC SATURATED O2: 100 % (ref 95–100)
POC SATURATED O2: 100 % (ref 95–100)
POC SATURATED O2: 98 % (ref 95–100)
POC SATURATED O2: 98 % (ref 95–100)
POC SATURATED O2: 99 % (ref 95–100)
POC SATURATED O2: 99 % (ref 95–100)
POC TCO2: 20 MMOL/L (ref 23–27)
POC TCO2: 22 MMOL/L (ref 23–27)
POC TCO2: 23 MMOL/L (ref 23–27)
POC TCO2: 24 MMOL/L (ref 23–27)
POC TCO2: 24 MMOL/L (ref 23–27)
POC TCO2: 25 MMOL/L (ref 23–27)
POCT GLUCOSE: 113 MG/DL (ref 70–110)
POCT GLUCOSE: 120 MG/DL (ref 70–110)
POCT GLUCOSE: 129 MG/DL (ref 70–110)
POCT GLUCOSE: 133 MG/DL (ref 70–110)
POCT GLUCOSE: 161 MG/DL (ref 70–110)
POCT GLUCOSE: 165 MG/DL (ref 70–110)
POCT GLUCOSE: 173 MG/DL (ref 70–110)
POCT GLUCOSE: 176 MG/DL (ref 70–110)
POCT GLUCOSE: 179 MG/DL (ref 70–110)
POCT GLUCOSE: 191 MG/DL (ref 70–110)
POCT GLUCOSE: 192 MG/DL (ref 70–110)
POCT GLUCOSE: 193 MG/DL (ref 70–110)
POCT GLUCOSE: 193 MG/DL (ref 70–110)
POCT GLUCOSE: 195 MG/DL (ref 70–110)
POCT GLUCOSE: 204 MG/DL (ref 70–110)
POCT GLUCOSE: 213 MG/DL (ref 70–110)
POCT GLUCOSE: 214 MG/DL (ref 70–110)
POCT GLUCOSE: 235 MG/DL (ref 70–110)
POIKILOCYTOSIS BLD QL SMEAR: SLIGHT
POIKILOCYTOSIS BLD QL SMEAR: SLIGHT
POLYCHROMASIA BLD QL SMEAR: ABNORMAL
POLYCHROMASIA BLD QL SMEAR: ABNORMAL
POTASSIUM BLD-SCNC: 3.6 MMOL/L (ref 3.5–5.1)
POTASSIUM BLD-SCNC: 3.7 MMOL/L (ref 3.5–5.1)
POTASSIUM BLD-SCNC: 3.7 MMOL/L (ref 3.5–5.1)
POTASSIUM BLD-SCNC: 4.2 MMOL/L (ref 3.5–5.1)
POTASSIUM BLD-SCNC: 4.2 MMOL/L (ref 3.5–5.1)
POTASSIUM SERPL-SCNC: 3.9 MMOL/L (ref 3.5–5.1)
POTASSIUM SERPL-SCNC: 3.9 MMOL/L (ref 3.5–5.1)
POTASSIUM SERPL-SCNC: 4.1 MMOL/L (ref 3.5–5.1)
POTASSIUM SERPL-SCNC: 4.1 MMOL/L (ref 3.5–5.1)
POTASSIUM SERPL-SCNC: 4.2 MMOL/L (ref 3.5–5.1)
POTASSIUM SERPL-SCNC: 4.3 MMOL/L (ref 3.5–5.1)
POTASSIUM SERPL-SCNC: 4.4 MMOL/L (ref 3.5–5.1)
PROT SERPL-MCNC: 6.5 G/DL (ref 6–8.4)
PROT SERPL-MCNC: 6.7 G/DL (ref 6–8.4)
PROT SERPL-MCNC: 6.7 G/DL (ref 6–8.4)
PROT SERPL-MCNC: 6.8 G/DL (ref 6–8.4)
PROTHROMBIN TIME: 11.8 SEC (ref 9–12.5)
PROTHROMBIN TIME: 12 SEC (ref 9–12.5)
PROTHROMBIN TIME: 12.4 SEC (ref 9–12.5)
PROTHROMBIN TIME: 12.5 SEC (ref 9–12.5)
PS: 8
RBC # BLD AUTO: 2.67 M/UL (ref 4.6–6.2)
RBC # BLD AUTO: 2.73 M/UL (ref 4.6–6.2)
RBC # BLD AUTO: 2.74 M/UL (ref 4.6–6.2)
RBC # BLD AUTO: 2.83 M/UL (ref 4.6–6.2)
RBC # BLD AUTO: 2.83 M/UL (ref 4.6–6.2)
RBC # BLD AUTO: 2.93 M/UL (ref 4.6–6.2)
RBC # BLD AUTO: 2.95 M/UL (ref 4.6–6.2)
SAMPLE: ABNORMAL
SITE: ABNORMAL
SODIUM BLD-SCNC: 137 MMOL/L (ref 136–145)
SODIUM BLD-SCNC: 137 MMOL/L (ref 136–145)
SODIUM BLD-SCNC: 138 MMOL/L (ref 136–145)
SODIUM SERPL-SCNC: 135 MMOL/L (ref 136–145)
SODIUM SERPL-SCNC: 136 MMOL/L (ref 136–145)
SODIUM SERPL-SCNC: 137 MMOL/L (ref 136–145)
SODIUM SERPL-SCNC: 137 MMOL/L (ref 136–145)
SODIUM SERPL-SCNC: 138 MMOL/L (ref 136–145)
SODIUM SERPL-SCNC: 138 MMOL/L (ref 136–145)
SODIUM SERPL-SCNC: 140 MMOL/L (ref 136–145)
SP02: 100
SP02: 100
SPHEROCYTES BLD QL SMEAR: ABNORMAL
SPONT RATE: 38
VANCOMYCIN SERPL-MCNC: 14.4 UG/ML
VT: 580
WBC # BLD AUTO: 26.47 K/UL (ref 3.9–12.7)
WBC # BLD AUTO: 26.94 K/UL (ref 3.9–12.7)
WBC # BLD AUTO: 27.16 K/UL (ref 3.9–12.7)
WBC # BLD AUTO: 27.53 K/UL (ref 3.9–12.7)
WBC # BLD AUTO: 28.29 K/UL (ref 3.9–12.7)
WBC # BLD AUTO: 28.35 K/UL (ref 3.9–12.7)
WBC # BLD AUTO: 29.37 K/UL (ref 3.9–12.7)

## 2022-07-01 PROCEDURE — 99233 SBSQ HOSP IP/OBS HIGH 50: CPT | Mod: ,,, | Performed by: NURSE PRACTITIONER

## 2022-07-01 PROCEDURE — 83615 LACTATE (LD) (LDH) ENZYME: CPT | Performed by: THORACIC SURGERY (CARDIOTHORACIC VASCULAR SURGERY)

## 2022-07-01 PROCEDURE — 84100 ASSAY OF PHOSPHORUS: CPT | Mod: 91 | Performed by: THORACIC SURGERY (CARDIOTHORACIC VASCULAR SURGERY)

## 2022-07-01 PROCEDURE — 84132 ASSAY OF SERUM POTASSIUM: CPT

## 2022-07-01 PROCEDURE — 99291 PR CRITICAL CARE, E/M 30-74 MINUTES: ICD-10-PCS | Mod: ,,, | Performed by: NURSE PRACTITIONER

## 2022-07-01 PROCEDURE — 63600175 PHARM REV CODE 636 W HCPCS: Performed by: STUDENT IN AN ORGANIZED HEALTH CARE EDUCATION/TRAINING PROGRAM

## 2022-07-01 PROCEDURE — 85610 PROTHROMBIN TIME: CPT | Mod: 91 | Performed by: STUDENT IN AN ORGANIZED HEALTH CARE EDUCATION/TRAINING PROGRAM

## 2022-07-01 PROCEDURE — 82330 ASSAY OF CALCIUM: CPT

## 2022-07-01 PROCEDURE — 97535 SELF CARE MNGMENT TRAINING: CPT

## 2022-07-01 PROCEDURE — 63600175 PHARM REV CODE 636 W HCPCS: Performed by: THORACIC SURGERY (CARDIOTHORACIC VASCULAR SURGERY)

## 2022-07-01 PROCEDURE — 27000221 HC OXYGEN, UP TO 24 HOURS

## 2022-07-01 PROCEDURE — 85027 COMPLETE CBC AUTOMATED: CPT | Performed by: STUDENT IN AN ORGANIZED HEALTH CARE EDUCATION/TRAINING PROGRAM

## 2022-07-01 PROCEDURE — 63600367 HC NITRIC OXIDE PER HOUR

## 2022-07-01 PROCEDURE — 80076 HEPATIC FUNCTION PANEL: CPT | Performed by: STUDENT IN AN ORGANIZED HEALTH CARE EDUCATION/TRAINING PROGRAM

## 2022-07-01 PROCEDURE — 84100 ASSAY OF PHOSPHORUS: CPT | Mod: 91 | Performed by: STUDENT IN AN ORGANIZED HEALTH CARE EDUCATION/TRAINING PROGRAM

## 2022-07-01 PROCEDURE — 25000003 PHARM REV CODE 250: Performed by: STUDENT IN AN ORGANIZED HEALTH CARE EDUCATION/TRAINING PROGRAM

## 2022-07-01 PROCEDURE — 99291 CRITICAL CARE FIRST HOUR: CPT | Mod: ,,, | Performed by: NURSE PRACTITIONER

## 2022-07-01 PROCEDURE — 80053 COMPREHEN METABOLIC PANEL: CPT | Performed by: THORACIC SURGERY (CARDIOTHORACIC VASCULAR SURGERY)

## 2022-07-01 PROCEDURE — 85610 PROTHROMBIN TIME: CPT | Performed by: STUDENT IN AN ORGANIZED HEALTH CARE EDUCATION/TRAINING PROGRAM

## 2022-07-01 PROCEDURE — 25000003 PHARM REV CODE 250

## 2022-07-01 PROCEDURE — 99291 CRITICAL CARE FIRST HOUR: CPT | Mod: ,,, | Performed by: ANESTHESIOLOGY

## 2022-07-01 PROCEDURE — 84295 ASSAY OF SERUM SODIUM: CPT

## 2022-07-01 PROCEDURE — 80202 ASSAY OF VANCOMYCIN: CPT | Performed by: THORACIC SURGERY (CARDIOTHORACIC VASCULAR SURGERY)

## 2022-07-01 PROCEDURE — 92610 EVALUATE SWALLOWING FUNCTION: CPT

## 2022-07-01 PROCEDURE — 25000003 PHARM REV CODE 250: Performed by: INTERNAL MEDICINE

## 2022-07-01 PROCEDURE — 80053 COMPREHEN METABOLIC PANEL: CPT | Mod: 91 | Performed by: THORACIC SURGERY (CARDIOTHORACIC VASCULAR SURGERY)

## 2022-07-01 PROCEDURE — 25000003 PHARM REV CODE 250: Performed by: NURSE PRACTITIONER

## 2022-07-01 PROCEDURE — 85384 FIBRINOGEN ACTIVITY: CPT | Performed by: STUDENT IN AN ORGANIZED HEALTH CARE EDUCATION/TRAINING PROGRAM

## 2022-07-01 PROCEDURE — 85730 THROMBOPLASTIN TIME PARTIAL: CPT | Mod: 91 | Performed by: STUDENT IN AN ORGANIZED HEALTH CARE EDUCATION/TRAINING PROGRAM

## 2022-07-01 PROCEDURE — 94003 VENT MGMT INPAT SUBQ DAY: CPT

## 2022-07-01 PROCEDURE — 85014 HEMATOCRIT: CPT

## 2022-07-01 PROCEDURE — 25000003 PHARM REV CODE 250: Performed by: THORACIC SURGERY (CARDIOTHORACIC VASCULAR SURGERY)

## 2022-07-01 PROCEDURE — 94761 N-INVAS EAR/PLS OXIMETRY MLT: CPT

## 2022-07-01 PROCEDURE — 36430 TRANSFUSION BLD/BLD COMPNT: CPT

## 2022-07-01 PROCEDURE — 85007 BL SMEAR W/DIFF WBC COUNT: CPT | Performed by: STUDENT IN AN ORGANIZED HEALTH CARE EDUCATION/TRAINING PROGRAM

## 2022-07-01 PROCEDURE — 99292 PR CRITICAL CARE, ADDL 30 MIN: ICD-10-PCS | Mod: ,,, | Performed by: INTERNAL MEDICINE

## 2022-07-01 PROCEDURE — 83735 ASSAY OF MAGNESIUM: CPT | Mod: 91 | Performed by: STUDENT IN AN ORGANIZED HEALTH CARE EDUCATION/TRAINING PROGRAM

## 2022-07-01 PROCEDURE — P9016 RBC LEUKOCYTES REDUCED: HCPCS | Performed by: THORACIC SURGERY (CARDIOTHORACIC VASCULAR SURGERY)

## 2022-07-01 PROCEDURE — 99900026 HC AIRWAY MAINTENANCE (STAT)

## 2022-07-01 PROCEDURE — 27000248 HC VAD-ADDITIONAL DAY

## 2022-07-01 PROCEDURE — 83605 ASSAY OF LACTIC ACID: CPT

## 2022-07-01 PROCEDURE — 20000000 HC ICU ROOM

## 2022-07-01 PROCEDURE — 63600175 PHARM REV CODE 636 W HCPCS

## 2022-07-01 PROCEDURE — 85025 COMPLETE CBC W/AUTO DIFF WBC: CPT | Mod: 91 | Performed by: THORACIC SURGERY (CARDIOTHORACIC VASCULAR SURGERY)

## 2022-07-01 PROCEDURE — 85025 COMPLETE CBC W/AUTO DIFF WBC: CPT | Mod: 91 | Performed by: STUDENT IN AN ORGANIZED HEALTH CARE EDUCATION/TRAINING PROGRAM

## 2022-07-01 PROCEDURE — 99292 CRITICAL CARE ADDL 30 MIN: CPT | Mod: ,,, | Performed by: INTERNAL MEDICINE

## 2022-07-01 PROCEDURE — 83880 ASSAY OF NATRIURETIC PEPTIDE: CPT | Performed by: STUDENT IN AN ORGANIZED HEALTH CARE EDUCATION/TRAINING PROGRAM

## 2022-07-01 PROCEDURE — 63600175 PHARM REV CODE 636 W HCPCS: Performed by: INTERNAL MEDICINE

## 2022-07-01 PROCEDURE — 93750 PR INTERROGATE VENT ASSIST DEV, IN PERSON, W PHYSICIAN ANALYSIS: ICD-10-PCS | Mod: ,,, | Performed by: INTERNAL MEDICINE

## 2022-07-01 PROCEDURE — 99900035 HC TECH TIME PER 15 MIN (STAT)

## 2022-07-01 PROCEDURE — 27000203 HC IMPELLA ADD'L DAY (CL)

## 2022-07-01 PROCEDURE — 27202088

## 2022-07-01 PROCEDURE — 83735 ASSAY OF MAGNESIUM: CPT | Performed by: STUDENT IN AN ORGANIZED HEALTH CARE EDUCATION/TRAINING PROGRAM

## 2022-07-01 PROCEDURE — 83735 ASSAY OF MAGNESIUM: CPT | Mod: 91 | Performed by: THORACIC SURGERY (CARDIOTHORACIC VASCULAR SURGERY)

## 2022-07-01 PROCEDURE — 86140 C-REACTIVE PROTEIN: CPT | Performed by: STUDENT IN AN ORGANIZED HEALTH CARE EDUCATION/TRAINING PROGRAM

## 2022-07-01 PROCEDURE — 99291 PR CRITICAL CARE, E/M 30-74 MINUTES: ICD-10-PCS | Mod: ,,, | Performed by: ANESTHESIOLOGY

## 2022-07-01 PROCEDURE — 99233 PR SUBSEQUENT HOSPITAL CARE,LEVL III: ICD-10-PCS | Mod: ,,, | Performed by: NURSE PRACTITIONER

## 2022-07-01 PROCEDURE — 84100 ASSAY OF PHOSPHORUS: CPT | Performed by: STUDENT IN AN ORGANIZED HEALTH CARE EDUCATION/TRAINING PROGRAM

## 2022-07-01 PROCEDURE — 82803 BLOOD GASES ANY COMBINATION: CPT

## 2022-07-01 PROCEDURE — P9045 ALBUMIN (HUMAN), 5%, 250 ML: HCPCS | Mod: JG | Performed by: THORACIC SURGERY (CARDIOTHORACIC VASCULAR SURGERY)

## 2022-07-01 PROCEDURE — 85730 THROMBOPLASTIN TIME PARTIAL: CPT | Performed by: STUDENT IN AN ORGANIZED HEALTH CARE EDUCATION/TRAINING PROGRAM

## 2022-07-01 PROCEDURE — 83050 HGB METHEMOGLOBIN QUAN: CPT

## 2022-07-01 PROCEDURE — 37799 UNLISTED PX VASCULAR SURGERY: CPT

## 2022-07-01 PROCEDURE — 93750 INTERROGATION VAD IN PERSON: CPT | Mod: ,,, | Performed by: INTERNAL MEDICINE

## 2022-07-01 RX ORDER — LIDOCAINE HYDROCHLORIDE 10 MG/ML
10 INJECTION INFILTRATION; PERINEURAL ONCE
Status: COMPLETED | OUTPATIENT
Start: 2022-07-01 | End: 2022-07-01

## 2022-07-01 RX ORDER — WARFARIN 2 MG/1
2 TABLET ORAL DAILY
Status: DISCONTINUED | OUTPATIENT
Start: 2022-07-01 | End: 2022-07-03

## 2022-07-01 RX ORDER — ALBUMIN HUMAN 50 G/1000ML
25 SOLUTION INTRAVENOUS ONCE
Status: COMPLETED | OUTPATIENT
Start: 2022-07-01 | End: 2022-07-01

## 2022-07-01 RX ORDER — IBUPROFEN 200 MG
24 TABLET ORAL
Status: DISCONTINUED | OUTPATIENT
Start: 2022-07-01 | End: 2022-07-10

## 2022-07-01 RX ORDER — INDOMETHACIN 25 MG/1
50 CAPSULE ORAL ONCE
Status: COMPLETED | OUTPATIENT
Start: 2022-07-01 | End: 2022-07-01

## 2022-07-01 RX ORDER — VASOPRESSIN 20 [USP'U]/ML
INJECTION, SOLUTION INTRAMUSCULAR; SUBCUTANEOUS
Status: COMPLETED
Start: 2022-07-01 | End: 2022-07-01

## 2022-07-01 RX ORDER — ALBUMIN HUMAN 50 G/1000ML
25 SOLUTION INTRAVENOUS ONCE
Status: COMPLETED | OUTPATIENT
Start: 2022-07-01 | End: 2022-07-02

## 2022-07-01 RX ORDER — GLUCAGON 1 MG
1 KIT INJECTION
Status: DISCONTINUED | OUTPATIENT
Start: 2022-07-01 | End: 2022-07-10

## 2022-07-01 RX ORDER — INSULIN ASPART 100 [IU]/ML
0-10 INJECTION, SOLUTION INTRAVENOUS; SUBCUTANEOUS
Status: DISCONTINUED | OUTPATIENT
Start: 2022-07-01 | End: 2022-07-10

## 2022-07-01 RX ORDER — HYDROMORPHONE HYDROCHLORIDE 1 MG/ML
0.5 INJECTION, SOLUTION INTRAMUSCULAR; INTRAVENOUS; SUBCUTANEOUS EVERY 4 HOURS PRN
Status: DISCONTINUED | OUTPATIENT
Start: 2022-07-01 | End: 2022-07-01

## 2022-07-01 RX ORDER — HYDROCODONE BITARTRATE AND ACETAMINOPHEN 500; 5 MG/1; MG/1
TABLET ORAL
Status: DISCONTINUED | OUTPATIENT
Start: 2022-07-01 | End: 2022-07-05

## 2022-07-01 RX ORDER — IBUPROFEN 200 MG
16 TABLET ORAL
Status: DISCONTINUED | OUTPATIENT
Start: 2022-07-01 | End: 2022-07-10

## 2022-07-01 RX ORDER — FENTANYL CITRATE 50 UG/ML
25 INJECTION, SOLUTION INTRAMUSCULAR; INTRAVENOUS EVERY 4 HOURS PRN
Status: DISCONTINUED | OUTPATIENT
Start: 2022-07-01 | End: 2022-07-04

## 2022-07-01 RX ADMIN — WARFARIN SODIUM 2 MG: 2 TABLET ORAL at 04:07

## 2022-07-01 RX ADMIN — DAPTOMYCIN 750 MG: 350 INJECTION, POWDER, LYOPHILIZED, FOR SOLUTION INTRAVENOUS at 02:07

## 2022-07-01 RX ADMIN — FUROSEMIDE 7.5 MG/HR: 10 INJECTION, SOLUTION INTRAMUSCULAR; INTRAVENOUS at 01:07

## 2022-07-01 RX ADMIN — POTASSIUM CHLORIDE 20 MEQ: 200 INJECTION, SOLUTION INTRAVENOUS at 05:07

## 2022-07-01 RX ADMIN — HEPARIN SODIUM 200 UNITS/HR: 5000 INJECTION INTRAVENOUS; SUBCUTANEOUS at 12:07

## 2022-07-01 RX ADMIN — PROPOFOL 50 MCG/KG/MIN: 10 INJECTION, EMULSION INTRAVENOUS at 12:07

## 2022-07-01 RX ADMIN — FAMOTIDINE 20 MG: 10 INJECTION, SOLUTION INTRAVENOUS at 08:07

## 2022-07-01 RX ADMIN — INSULIN HUMAN 3 UNITS/HR: 1 INJECTION, SOLUTION INTRAVENOUS at 06:07

## 2022-07-01 RX ADMIN — POTASSIUM CHLORIDE 40 MEQ: 200 INJECTION, SOLUTION INTRAVENOUS at 12:07

## 2022-07-01 RX ADMIN — FENTANYL CITRATE 25 MCG: 50 INJECTION INTRAMUSCULAR; INTRAVENOUS at 04:07

## 2022-07-01 RX ADMIN — CEFEPIME HYDROCHLORIDE 2 G: 2 INJECTION, SOLUTION INTRAVENOUS at 04:07

## 2022-07-01 RX ADMIN — ALBUMIN (HUMAN) 25 G: 12.5 SOLUTION INTRAVENOUS at 06:07

## 2022-07-01 RX ADMIN — SODIUM BICARBONATE 25 MEQ: 84 INJECTION, SOLUTION INTRAVENOUS at 10:07

## 2022-07-01 RX ADMIN — EPINEPHRINE 0.06 MCG/KG/MIN: 1 INJECTION INTRAMUSCULAR; INTRAVENOUS; SUBCUTANEOUS at 02:07

## 2022-07-01 RX ADMIN — VASOPRESSIN: 20 INJECTION INTRAVENOUS at 07:07

## 2022-07-01 RX ADMIN — ACETAMINOPHEN 1000 MG: 10 INJECTION INTRAVENOUS at 05:07

## 2022-07-01 RX ADMIN — CHLOROTHIAZIDE SODIUM 500 MG: 500 INJECTION, POWDER, LYOPHILIZED, FOR SOLUTION INTRAVENOUS at 10:07

## 2022-07-01 RX ADMIN — MUPIROCIN: 20 OINTMENT TOPICAL at 08:07

## 2022-07-01 RX ADMIN — INSULIN HUMAN 4.7 UNITS/HR: 1 INJECTION, SOLUTION INTRAVENOUS at 12:07

## 2022-07-01 RX ADMIN — POLYETHYLENE GLYCOL 3350 17 G: 17 POWDER, FOR SOLUTION ORAL at 09:07

## 2022-07-01 RX ADMIN — OXYCODONE 5 MG: 5 TABLET ORAL at 03:07

## 2022-07-01 RX ADMIN — ALBUMIN (HUMAN) 25 G: 12.5 SOLUTION INTRAVENOUS at 10:07

## 2022-07-01 RX ADMIN — LIDOCAINE HYDROCHLORIDE 10 ML: 10 INJECTION, SOLUTION INFILTRATION; PERINEURAL at 07:07

## 2022-07-01 RX ADMIN — PROPOFOL 50 MCG/KG/MIN: 10 INJECTION, EMULSION INTRAVENOUS at 05:07

## 2022-07-01 RX ADMIN — HYDROMORPHONE HYDROCHLORIDE 0.5 MG: 1 INJECTION, SOLUTION INTRAMUSCULAR; INTRAVENOUS; SUBCUTANEOUS at 07:07

## 2022-07-01 RX ADMIN — HYDROMORPHONE HYDROCHLORIDE 0.5 MG: 1 INJECTION, SOLUTION INTRAMUSCULAR; INTRAVENOUS; SUBCUTANEOUS at 12:07

## 2022-07-01 NOTE — PROGRESS NOTES
Dr. Paige updated this AM  on all VS, gtts, UOP, and lab results, orders received and implemented.

## 2022-07-01 NOTE — PROGRESS NOTES
Update:  SW spoke with pt's mom who reported that she has checked out of BH and will be staying in the waiting area. Pt's fiance is at bedside.  Mom reported that she did receive food vouchers and was appreciative.  No other needs indicated at this time.  Support given.

## 2022-07-01 NOTE — ASSESSMENT & PLAN NOTE
Endocrinology consulted for BG management.   BG goal 140-180      Change to transition insulin infusion at 3 u/hr  BG monitoring ac/hs/0200 and moderate dose correction scale.     ** Please notify Endocrine for any change and/or advance in diet**  ** Please call Endocrine for any BG related issues **    Discharge Planning:   TBD. Please notify endocrinology prior to discharge.

## 2022-07-01 NOTE — ANESTHESIA POSTPROCEDURE EVALUATION
Anesthesia Post Evaluation    Patient: Kevan Queen    Procedure(s) Performed: Procedure(s) (LRB):  CLOSURE, WOUND, STERNUM (N/A)  INSERTION-RIGHT VENTRICULAR ASSIST DEVICE (Right)  APPLICATION, WOUND VAC (N/A)  IRRIGATION, MEDIASTINUM    Final Anesthesia Type: general      Patient location during evaluation: ICU  Patient participation: No - Unable to Participate, Intubation  Level of consciousness: sedated  Post-procedure vital signs: reviewed and stable  Pain management: adequate  Airway patency: patent  MORELIA mitigation strategies: Intraoperative administration of CPAP, nasopharyngeal airway, or oral appliance during sedation and Multimodal analgesia  PONV status at discharge: No PONV  Anesthetic complications: no      Cardiovascular status: hemodynamically stable (requiring inotropic and mechanical support )  Respiratory status: ETT and ventilator  Hydration status: euvolemic  Follow-up not needed.          Vitals Value Taken Time   BP 78/0 07/01/22 0738   Temp 37.8 °C (100.04 °F) 07/01/22 0801   Pulse 109 07/01/22 0801   Resp 23 07/01/22 0547   SpO2 100 % 07/01/22 0801   Vitals shown include unvalidated device data.      No case tracking events are documented in the log.      Pain/Gopi Score: Pain Rating Prior to Med Admin: 0 (7/1/2022  5:16 AM)

## 2022-07-01 NOTE — PROGRESS NOTES
07/01/2022  Enrique NANDO Awan Jr    Current provider:  Luis F Paige MD    Device interrogation:  TXP LVAD INTERROGATIONS 7/1/2022 7/1/2022 7/1/2022 7/1/2022 7/1/2022 7/1/2022 7/1/2022   Type HeartMate3 HeartMate3 HeartMate3 HeartMate3 HeartMate3 HeartMate3 HeartMate3   Flow 3.8 3.7 3.8 3.8 3.9 3.8 3.9   Speed 5000 5000 5000 5000 5000 5000 5000   PI 5.7 5.5 5.6 5.7 5.2 5.6 5.5   Power (Serna) 3.4 3.5 3.4 3.4 3.4 3.3 3.4   LSL - - 4600 4600 4600 4600 4600   Pulsatility - - Pulse Pulse Pulse Pulse Pulse          Rounded on Kevan Queen to ensure all mechanical assist device settings (IABP or VAD) were appropriate and all parameters were within limits.  I was able to ensure all back up equipment was present, the staff had no issues, and the Perfusion Department daily rounding was complete.      For implantable VADs: Interrogation of Ventricular assist device was performed with analysis of device parameters and review of device function. I have personally reviewed the interrogation findings and agree with findings as stated.     In emergency, the nursing units have been notified to contact the perfusion department either by:  Calling [phone] from 630am to 4pm Mon thru Fri, utilizing the On-Call Finder functionality of Epic and searching for Perfusion, or by contacting the hospital  from 4pm to 630am and on weekends and asking to speak with the perfusionist on call.    9:10 AM

## 2022-07-01 NOTE — ASSESSMENT & PLAN NOTE
-Blood cultures 6/20 and repeat 6/21 positive for Staph Epi. One of two blood cultures from 6/23 positive for Staph (taken prior to line exchange). Repeat cultures sent 6/25 NGTD.   -IJ exchanged on 6/23, IABP exchanged 6/23  -Appreciate ID's help again:   · Currently on IV Cefepime per CTS  · ID recommend a 14 day course of antibiotics from VAD implantation on 6/29. End date: 7/12/22  · Blood cxs 6/26 -ve. Blood cxs repeated yesterday 2/2 temp spike, climbing WCC  · No absolute contraindications from ID standpoint to proceed with VAD implant on 6/29 if blood cultures remain no growth.

## 2022-07-01 NOTE — SUBJECTIVE & OBJECTIVE
**Interval History: POD # 2 DT HM3 with MVR and RV support with ProTek Duo, POD #1 chest closure. Intubated and sedated. Follows commands with sedation breaks but becomes agitated and coughs which has led to loss of pulsatility and drop in BP. LVAD still at 5100 with flows 3.7-3.8, RVAD at 5000 with flows 3.2-3.4 - Dr.. Paige aware.  continues at 2.5 mcg, Epi at 0.06 mcg, Lasix decreased to 7.5 mg/hr with CVP now 8-9 - getting Albumin, Marcella 5 PPM, off and on Cardene and now on fixed dosed Heparin gtt. sCr climbing at 2.4, LFT's climbing. LDH also climbing at 1040. T max 102.7, Blood cxs repeated yesterday with NGTD, Cefepime continues    Continuous Infusions:   sodium chloride 0.9% 5 mL/hr at 06/27/22 0055    sodium chloride 0.9% Stopped (07/01/22 0606)    DOBUTamine IV infusion (non-titrating) 2.5 mcg/kg/min (07/01/22 0845)    EPINEPHrine 0.06 mcg/kg/min (07/01/22 0845)    furosemide (LASIX) 2 mg/mL continuous infusion (non-titrating) 7.5 mg/hr (07/01/22 0845)    heparin (porcine) in 5 % dex 400 Units/hr (07/01/22 0845)    insulin regular 1 units/mL infusion orderable (DKA) 2.7 Units/hr (07/01/22 0845)    nicardipine 3 mg/hr (07/01/22 0845)    nitric oxide gas      propofoL 25 mcg/kg/min (07/01/22 0845)     Scheduled Meds:   albumin human 5%  25 g Intravenous Once    albumin human 5%  25 g Intravenous Once    famotidine (PF)  20 mg Intravenous BID    ferrous gluconate  324 mg Oral Daily with breakfast    mupirocin   Nasal BID    polyethylene glycol  17 g Per NG tube Daily     PRN Meds:sodium chloride, sodium chloride, albumin human 5%, albuterol sulfate, bisacodyL, dextrose 10%, dextrose 10%, fentaNYL, magnesium hydroxide 400 mg/5 ml, magnesium sulfate IVPB, oxyCODONE, oxyCODONE, potassium chloride, potassium chloride, sodium chloride 0.9%, sodium chloride 0.9%    Review of patient's allergies indicates:   Allergen Reactions    Aspirin Other (See Comments)     Mr. Thacker is enrolled in Dr. Paige's [facility] Trial  and cannot have any aspirin and/or aspirin-containing products. DO NOT cancel any orders for INV Aspirin 100 mg/Placebo. If you have questions, please contact Isabel @ [phone], [phone], secure chat or MS Teams message.    Bumex [bumetanide] Hives    Lactose Diarrhea     Other reaction(s): Abdominal distension, gaseous    Torsemide Hives     Objective:     Vital Signs (Most Recent):  Temp: 100.04 °F (37.8 °C) (07/01/22 0815)  Pulse: (!) 111 (07/01/22 0815)  Resp: (!) 23 (07/01/22 0547)  BP: (!) 78/0 (07/01/22 0738)  SpO2: 100 % (07/01/22 0815)   Vital Signs (24h Range):  Temp:  [99.86 °F (37.7 °C)-102.74 °F (39.3 °C)] 100.04 °F (37.8 °C)  Pulse:  [106-129] 111  Resp:  [22-27] 23  SpO2:  [94 %-100 %] 100 %  BP: (78)/(0) 78/0  Arterial Line BP: ()/(28-84) 82/69     Patient Vitals for the past 72 hrs (Last 3 readings):   Weight   06/29/22 0200 93.7 kg (206 lb 9.1 oz)     Body mass index is 25.82 kg/m².      Intake/Output Summary (Last 24 hours) at 7/1/2022 0848  Last data filed at 7/1/2022 0845  Gross per 24 hour   Intake 4024.62 ml   Output 4135 ml   Net -110.38 ml       Hemodynamic Parameters:       Telemetry: ST    Physical Exam  Constitutional:       Comments: Intubated and sedated   HENT:      Head: Normocephalic and atraumatic.   Eyes:      Conjunctiva/sclera: Conjunctivae normal.      Pupils: Pupils are equal, round, and reactive to light.   Neck:      Comments: LIJ TLC, RIJ ProTek Duo  Cardiovascular:      Rate and Rhythm: Regular rhythm. Tachycardia present.      Comments: Smooth VAD hum  Pulmonary:      Breath sounds: Normal breath sounds.      Comments: Intubated on ventilator  Abdominal:      Palpations: Abdomen is soft.      Comments: Hypoactive bowel sounds   Musculoskeletal:         General: No swelling.      Cervical back: Neck supple.   Skin:     General: Skin is dry.      Capillary Refill: Capillary refill takes 2 to 3 seconds.      Comments: Extremities cool to touch  with fans   Neurological:      Comments: Intubated and sedated       Significant Labs:  CBC:  Recent Labs   Lab 06/30/22 2015 06/30/22 2022 07/01/22  0000 07/01/22  0009 07/01/22  0354 07/01/22  0411   WBC 29.72*  --  26.94*  --   --  27.53*   RBC 2.97*  --  2.83*  --   --  2.93*   HGB 8.8*  --  8.2*  --   --  8.7*   HCT 25.7*   < > 24.5* 26* 26* 25.9*     --  224  --   --  216   MCV 87  --  87  --   --  88   MCH 29.6  --  29.0  --   --  29.7   MCHC 34.2  --  33.5  --   --  33.6    < > = values in this interval not displayed.     BNP:  Recent Labs   Lab 06/28/22  0921 07/01/22  0411   * 520*     CMP:  Recent Labs   Lab 07/01/22  0000 07/01/22  0412 07/01/22  0802   * 196* 115*   CALCIUM 8.9 9.0 9.0   ALBUMIN 3.4* 3.5  3.5 3.5   PROT 6.5 6.8  6.8 6.8    137 137   K 3.9 4.1 4.2   CO2 22* 21* 22*    103 104   BUN 53* 54* 55*   CREATININE 2.2* 2.4* 2.3*   ALKPHOS 92 87  87 90   ALT 42 58*  58* 86*   * 185*  185* 237*   BILITOT 2.1* 2.1*  2.1* 2.0*      Coagulation:   Recent Labs   Lab 07/01/22  0000 07/01/22  0412 07/01/22  0802   INR 1.2 1.2 1.2   APTT 24.5 24.9 24.9     LDH:  Recent Labs   Lab 06/29/22  0214 06/29/22  1809 06/30/22 0400 07/01/22 0412   * 789* 798*  798* 1,040*     Microbiology:  Microbiology Results (last 7 days)       Procedure Component Value Units Date/Time    Blood culture [[id number]] Collected: 06/30/22 1836    Order Status: Completed Specimen: Blood from Peripheral, Wrist, Left Updated: 07/01/22 0145     Blood Culture, Routine No Growth to date    Blood culture [[id number]] Collected: 06/30/22 1833    Order Status: Completed Specimen: Blood from Peripheral, Forearm, Right Updated: 07/01/22 0145     Blood Culture, Routine No Growth to date    Blood culture [[id number]] Collected: 06/25/22 0634    Order Status: Completed Specimen: Blood from Peripheral, Wrist, Left Updated: 06/30/22 0812     Blood Culture, Routine No growth after 5 days.     Blood culture [[id number]] Collected: 06/25/22 0633    Order Status: Completed Specimen: Blood from Peripheral, Antecubital, Right Updated: 06/30/22 0812     Blood Culture, Routine No growth after 5 days.    Blood culture [[id number]] Collected: 06/23/22 0105    Order Status: Completed Specimen: Blood from Peripheral, Antecubital, Right Updated: 06/28/22 0612     Blood Culture, Routine No growth after 5 days.    Blood culture [[id number]]  (Abnormal)  (Susceptibility) Collected: 06/23/22 0108    Order Status: Completed Specimen: Blood from Peripheral, Hand, Right Updated: 06/27/22 1116     Blood Culture, Routine Gram stain dudley bottle: Gram positive cocci in clusters resembling Staph       Results called to and read back by: Mark Pickett RN  06/24/2022        14:36      STAPHYLOCOCCUS EPIDERMIDIS    Blood culture [[id number]]  (Abnormal)  (Susceptibility) Collected: 06/21/22 2250    Order Status: Completed Specimen: Blood from Peripheral, Wrist, Left Updated: 06/26/22 1059     Blood Culture, Routine Gram stain dudley bottle: Gram positive cocci in clusters resembling Staph      Results called to and read back by: Rosa Pardo RN. 06/22/2022  23:29      Gram stain aer bottle: Gram positive cocci in clusters resembling Staph       Positive results previously called 06/23/2022  04:33      STAPHYLOCOCCUS EPIDERMIDIS    Blood culture [[id number]]  (Abnormal)  (Susceptibility) Collected: 06/21/22 2250    Order Status: Completed Specimen: Blood from Peripheral, Wrist, Left Updated: 06/25/22 1029     Blood Culture, Routine Gram stain dudley bottle: Gram positive cocci      Results called to and read back by: Arnulfo Herrera RN. 06/22/2022  17:46      Gram stain aer bottle: Gram positive cocci in clusters resembling Staph       Positive results previously called 06/23/2022  00:11      STAPHYLOCOCCUS EPIDERMIDIS    Blood culture [[id number]]  (Abnormal)  (Susceptibility) Collected: 06/20/22 1651    Order Status: Completed Specimen:  Blood from Peripheral, Hand, Left Updated: 06/25/22 1023     Blood Culture, Routine Gram stain dudley bottle: Gram positive cocci in clusters resembling Staph      Results called to and read back by:Tara López RN 06/21/2022  22:16      STAPHYLOCOCCUS EPIDERMIDIS            I have reviewed all pertinent labs within the past 24 hours.    Estimated Creatinine Clearance: 52.6 mL/min (A) (based on SCr of 2.3 mg/dL (H)).    Diagnostic Results:  I have reviewed and interpreted all pertinent imaging results/findings within the past 24 hours.

## 2022-07-01 NOTE — SUBJECTIVE & OBJECTIVE
Interval History/Significant Events: No acute events overnight. RVAD and LVAD speeds and flows stable. Remains on 0.06 epi, dobutamine 2.5, lasix gtt. Intermittently requiring cardene. Received 500 mL of albumin overnight.     Follow-up For: Procedure(s) (LRB):  CLOSURE, WOUND, STERNUM (N/A)  INSERTION-RIGHT VENTRICULAR ASSIST DEVICE (Right)  APPLICATION, WOUND VAC (N/A)  IRRIGATION, MEDIASTINUM    Post-Operative Day: 1 Day Post-Op    Objective:     Vital Signs (Most Recent):  Temp: 99.68 °F (37.6 °C) (07/01/22 1030)  Pulse: 106 (07/01/22 1030)  Resp: (!) 23 (07/01/22 0547)  BP: (!) 78/0 (07/01/22 0738)  SpO2: 100 % (07/01/22 1030)   Vital Signs (24h Range):  Temp:  [99.68 °F (37.6 °C)-102.74 °F (39.3 °C)] 99.68 °F (37.6 °C)  Pulse:  [106-129] 106  Resp:  [23-27] 23  SpO2:  [94 %-100 %] 100 %  BP: (78)/(0) 78/0  Arterial Line BP: ()/(28-84) 78/65     Weight: 93.7 kg (206 lb 9.1 oz)  Body mass index is 25.82 kg/m².      Intake/Output Summary (Last 24 hours) at 7/1/2022 1058  Last data filed at 7/1/2022 1000  Gross per 24 hour   Intake 3644.73 ml   Output 4010 ml   Net -365.27 ml       Physical Exam  Vitals reviewed.   Constitutional:       Comments: Intubated, sedated    HENT:      Head: Normocephalic and atraumatic.      Nose: Nose normal.      Mouth/Throat:      Mouth: Mucous membranes are moist.   Neck:      Comments: RVAD cannulation to [facility]    Cardiovascular:      Rate and Rhythm: Regular rhythm. Tachycardia present.      Pulses: Normal pulses.      Comments: S/p chest closure. Incision c/d/I.  Chest tubes in place with minimal sanguinous output      Pulmonary:      Comments: Intubated, mechanically ventilated  Abdominal:      General: Abdomen is flat. There is no distension.      Palpations: Abdomen is soft.   Genitourinary:     Comments: [email or web address]  Musculoskeletal:      Comments: IABP site at left fem with dressing in place   Skin:     General: Skin is warm and dry.       Vents:  Vent Mode: A/C (07/01/22  0737)  Ventilator Initiated: Yes (06/29/22 1527)  Set Rate: 18 BPM (07/01/22 0737)  Vt Set: 580 mL (07/01/22 0737)  PEEP/CPAP: 5 cmH20 (07/01/22 0737)  Oxygen Concentration (%): 50 (07/01/22 1030)  Peak Airway Pressure: 26 cmH2O (07/01/22 0737)  Plateau Pressure: 21 cmH20 (07/01/22 0737)  Total Ve: 13.5 mL (07/01/22 0737)  Negative Inspiratory Force (cm H2O): 0 (07/01/22 0737)  F/VT Ratio<105 (RSBI): (!) 40.64 (07/01/22 0547)    Lines/Drains/Airways       Central Venous Catheter Line  Duration              Introducer with Double Lumen 06/29/22 1123 1 day    Percutaneous Central Line Insertion/Assessment - Triple Lumen  06/29/22 1200 left internal jugular 1 day              Drain  Duration                  Urethral Catheter 06/29/22 0840 Straight-tip;Temperature probe;Non-latex 14 Fr. 2 days         Chest Tube 06/29/22 1425 1 Left Pleural 32 Fr. 1 day         Chest Tube 06/29/22 1425 2 Right Pleural 32 Fr. 1 day         NG/OG Tube 06/29/22 1630 orogastric 18 Fr. Center mouth 1 day              Airway  Duration                  Airway - Non-Surgical 06/29/22 0830 2 days              Arterial Line  Duration             Arterial Line 06/29/22 0832 Left Brachial 2 days              Line  Duration                  VAD 06/29/22 1115 Left ventricular assist device HeartMate 3 1 day         VAD 06/29/22 1415 Right ventricular assist device 1 day              Peripheral Intravenous Line  Duration                  Peripheral IV - Single Lumen 06/29/22 1330 18 G Right Antecubital 1 day         Peripheral IV - Single Lumen 06/29/22 2200 20 G Left;Posterior Hand 1 day                    Significant Labs:    CBC/Anemia Profile:  Recent Labs   Lab 07/01/22  0000 07/01/22  0009 07/01/22  0354 07/01/22  0411 07/01/22  0802   WBC 26.94*  --   --  27.53* 27.16*   HGB 8.2*  --   --  8.7* 8.5*   HCT 24.5*   < > 26* 25.9* 25.1*     --   --  216 222   MCV 87  --   --  88 89   RDW 14.9*  --   --  15.2* 15.3*    < > = values in this  interval not displayed.        Chemistries:  Recent Labs   Lab 07/01/22  0000 07/01/22  0412 07/01/22  0802    137 137   K 3.9 4.1 4.2    103 104   CO2 22* 21* 22*   BUN 53* 54* 55*   CREATININE 2.2* 2.4* 2.3*   CALCIUM 8.9 9.0 9.0   ALBUMIN 3.4* 3.5  3.5 3.5   PROT 6.5 6.8  6.8 6.8   BILITOT 2.1* 2.1*  2.1* 2.0*   ALKPHOS 92 87  87 90   ALT 42 58*  58* 86*   * 185*  185* 237*   MG 2.4 2.6 2.8*   PHOS 4.8* 4.9* 4.6*       All pertinent labs within the past 24 hours have been reviewed.    Significant Imaging:  I have reviewed all pertinent imaging results/findings within the past 24 hours.

## 2022-07-01 NOTE — PLAN OF CARE
Infectious Disease Note      Chart has been reviewed. Patient is a 37-year-old male with history of NIDCM with BiV systolic heart failure on home milrinone presented 6/13/2022 for ADHF, IABP placed 6/13/2022, now with Staph epi bacteremia, vancomycin initiated 6/22/2022. IABP and IJ line exchanged on 6/23/2022. Repeat blood cultures from 6/23/2022 positive - but this was prior to IABL and LIJ exchange.     Afebrile and with leukocytosis which suspected to be a a leukemoid reaction post op. Repeat blood cultures 6/25 remain NGTD. He underwent VAD implant. Vancomycin was discontinued by primary service due to elevated creatinine level.     Do not suspect renal failure is secondary to vancomycin as levels were not above 20 prior to creatinine elevation. However, will switch to daptomycin 8 mg/kg per day for 7 days due to ease of dosing. Discussed with primary team. Monitor CK levels at least once while on therapy.     Infectious Diseases will sign off. Please call if questions arise.   Melly Horta MD  Infectious Diseases

## 2022-07-01 NOTE — ASSESSMENT & PLAN NOTE
- [facility]  - Approved for LVAD at [facility] on 4/2/22. Was not evaluated for OHTx as he quit smoking in April 2022  - See LVAD    Unlikely OHTx candidate with smoking history (quit April 2022)

## 2022-07-01 NOTE — PROGRESS NOTES
[facility] - Surgical Intensive Care  Critical Care - Surgery  Progress Note    Patient Name: Kevan Queen  MRN: [id number]  Admission Date: 6/13/2022  Hospital Length of Stay: 18 days  Code Status: Prior  Attending Provider: Luis F Paige MD  Primary Care Provider: ORALIA Cline   Principal Problem: Acute on chronic combined systolic and diastolic heart failure, NYHA class 4    Subjective:     Hospital/ICU Course:  No notes on file    Interval History/Significant Events: No acute events overnight. RVAD and LVAD speeds and flows stable. Remains on 0.06 epi, dobutamine 2.5, lasix gtt. Intermittently requiring cardene. Received 500 mL of albumin overnight.     Follow-up For: Procedure(s) (LRB):  CLOSURE, WOUND, STERNUM (N/A)  INSERTION-RIGHT VENTRICULAR ASSIST DEVICE (Right)  APPLICATION, WOUND VAC (N/A)  IRRIGATION, MEDIASTINUM    Post-Operative Day: 1 Day Post-Op    Objective:     Vital Signs (Most Recent):  Temp: 99.68 °F (37.6 °C) (07/01/22 1030)  Pulse: 106 (07/01/22 1030)  Resp: (!) 23 (07/01/22 0547)  BP: (!) 78/0 (07/01/22 0738)  SpO2: 100 % (07/01/22 1030)   Vital Signs (24h Range):  Temp:  [99.68 °F (37.6 °C)-102.74 °F (39.3 °C)] 99.68 °F (37.6 °C)  Pulse:  [106-129] 106  Resp:  [23-27] 23  SpO2:  [94 %-100 %] 100 %  BP: (78)/(0) 78/0  Arterial Line BP: ()/(28-84) 78/65     Weight: 93.7 kg (206 lb 9.1 oz)  Body mass index is 25.82 kg/m².      Intake/Output Summary (Last 24 hours) at 7/1/2022 1058  Last data filed at 7/1/2022 1000  Gross per 24 hour   Intake 3644.73 ml   Output 4010 ml   Net -365.27 ml       Physical Exam  Vitals reviewed.   Constitutional:       Comments: Intubated, sedated    HENT:      Head: Normocephalic and atraumatic.      Nose: Nose normal.      Mouth/Throat:      Mouth: Mucous membranes are moist.   Neck:      Comments: RVAD cannulation to [facility]    Cardiovascular:      Rate and Rhythm: Regular rhythm. Tachycardia present.      Pulses: Normal pulses.      Comments: S/p chest  closure. Incision c/d/I.  Chest tubes in place with minimal sanguinous output      Pulmonary:      Comments: Intubated, mechanically ventilated  Abdominal:      General: Abdomen is flat. There is no distension.      Palpations: Abdomen is soft.   Genitourinary:     Comments: al  Musculoskeletal:      Comments: IABP site at left fem with dressing in place   Skin:     General: Skin is warm and dry.       Vents:  Vent Mode: A/C (07/01/22 0737)  Ventilator Initiated: Yes (06/29/22 1527)  Set Rate: 18 BPM (07/01/22 0737)  Vt Set: 580 mL (07/01/22 0737)  PEEP/CPAP: 5 cmH20 (07/01/22 0737)  Oxygen Concentration (%): 50 (07/01/22 1030)  Peak Airway Pressure: 26 cmH2O (07/01/22 0737)  Plateau Pressure: 21 cmH20 (07/01/22 0737)  Total Ve: 13.5 mL (07/01/22 0737)  Negative Inspiratory Force (cm H2O): 0 (07/01/22 0737)  F/VT Ratio<105 (RSBI): (!) 40.64 (07/01/22 0547)    Lines/Drains/Airways       Central Venous Catheter Line  Duration              Introducer with Double Lumen 06/29/22 1123 1 day    Percutaneous Central Line Insertion/Assessment - Triple Lumen  06/29/22 1200 left internal jugular 1 day              Drain  Duration                  Urethral Catheter 06/29/22 0840 Straight-tip;Temperature probe;Non-latex 14 Fr. 2 days         Chest Tube 06/29/22 1425 1 Left Pleural 32 Fr. 1 day         Chest Tube 06/29/22 1425 2 Right Pleural 32 Fr. 1 day         NG/OG Tube 06/29/22 1630 orogastric 18 Fr. Center mouth 1 day              Airway  Duration                  Airway - Non-Surgical 06/29/22 0830 2 days              Arterial Line  Duration             Arterial Line 06/29/22 0832 Left Brachial 2 days              Line  Duration                  VAD 06/29/22 1115 Left ventricular assist device HeartMate 3 1 day         VAD 06/29/22 1415 Right ventricular assist device 1 day              Peripheral Intravenous Line  Duration                  Peripheral IV - Single Lumen 06/29/22 1330 18 G Right Antecubital 1 day          Peripheral IV - Single Lumen 06/29/22 2200 20 G Left;Posterior Hand 1 day                    Significant Labs:    CBC/Anemia Profile:  Recent Labs   Lab 07/01/22  0000 07/01/22  0009 07/01/22  0354 07/01/22  0411 07/01/22  0802   WBC 26.94*  --   --  27.53* 27.16*   HGB 8.2*  --   --  8.7* 8.5*   HCT 24.5*   < > 26* 25.9* 25.1*     --   --  216 222   MCV 87  --   --  88 89   RDW 14.9*  --   --  15.2* 15.3*    < > = values in this interval not displayed.        Chemistries:  Recent Labs   Lab 07/01/22  0000 07/01/22  0412 07/01/22  0802    137 137   K 3.9 4.1 4.2    103 104   CO2 22* 21* 22*   BUN 53* 54* 55*   CREATININE 2.2* 2.4* 2.3*   CALCIUM 8.9 9.0 9.0   ALBUMIN 3.4* 3.5  3.5 3.5   PROT 6.5 6.8  6.8 6.8   BILITOT 2.1* 2.1*  2.1* 2.0*   ALKPHOS 92 87  87 90   ALT 42 58*  58* 86*   * 185*  185* 237*   MG 2.4 2.6 2.8*   PHOS 4.8* 4.9* 4.6*       All pertinent labs within the past 24 hours have been reviewed.    Significant Imaging:  I have reviewed all pertinent imaging results/findings within the past 24 hours.    Assessment/Plan:     * Acute on chronic combined systolic and diastolic heart failure, NYHA class 4  Kevan Queen is a 37yoM with a history of polysubstance abuse who presented to the hospital with decompensated heart failure. He underwent LVAD, RVAD and MVr on 6/29/22. He is admitted to the ICU post-operatively.       Neuro/Psych:   -- Sedation: Propofol. Wean today.  -- Pain: PRN fentanyl             Cards:   -- s/p LVAD, RVAD insertion and MVr   -- arrives on epi 0.06, dobutamine 2.5 for vasopressor support  -- s/p chest closure 6/30   -- MAP goal 75-85  -- Cardene for hypertension      Pulm:   -- Goal O2 sat > 90%  -- ABG PRN  -- SBT with possible extubation today  -- weaned to minimal ventilatory support  -- iNO2 @ 5ppm  -- daily CXR      Renal:  -- Keep melendez for strict I/O  -- Trend BUN/Cr   -- 2L intra-operative UOP  -- lasix gtt   -- net negative 700mL over  last 24hrs      FEN / GI:   -- Replace lytes as needed  -- Nutrition: NPO  -- GI ppx: famotidine  -- Bowel reg: miralax      ID:   -- Tm: febrile to 102.7; WBC stable  -- will consult ID to discuss abx that are not nephrotoxic      Heme/Onc:   -- H/H stable   -- Daily CBC  -- no intra-op product administered  -- 2u autologous  -- 1u pRBC, 1u FFP, 500mL albumin overnight (6/29)  -- Heparin gtt      Endo:   -- BG goal 140-180  -- Insulin gtt      PPx:   Feeding: NPO  Analgesia/Sedation: Propofol / PRN Fentanyl  Thromboembolic prevention: SCDs  HOB >30: Yes  Stress Ulcer ppx: famotidine  Glucose control: Critical care goal 140-180 g/dl, ISS     Lines/Drains/Airway: CVC RIJ, [place], Chest tubes x 2, L brachial A-line; RIJ RVAD; LVAD      Dispo/Code Status/Palliative:   -- SICU / Full Code.            Rebeca Horvath MD  Critical Care - Surgery  [facility] - Surgical Intensive Care

## 2022-07-01 NOTE — SUBJECTIVE & OBJECTIVE
"Interval HPI:   Overnight events: Remains in ICU. Extubated. BG reasonably well controlled on IV insulin infusoin 1.7 - 4.7 units today. 1 Day Post-Op LVAD  Eating:  Diet full liquid Fluid - 1500mL    Nausea: No  Hypoglycemia and intervention: No  Fever: No  TPN and/or TF: No      BP (!) 78/0   Pulse (!) 119   Temp (!) 100.94 °F (38.3 °C)   Resp (!) 35   Ht 6' 3" (1.905 m)   Wt 93.7 kg (206 lb 9.1 oz)   SpO2 100%   BMI 25.82 kg/m²     Labs Reviewed and Include    Recent Labs   Lab 07/01/22  1639   *   CALCIUM 9.1   ALBUMIN 3.6   PROT 6.8      K 3.9   CO2 23      BUN 59*   CREATININE 2.1*   ALKPHOS 86   *   *   BILITOT 2.4*     Lab Results   Component Value Date    WBC 28.29 (H) 07/01/2022    HGB 8.0 (L) 07/01/2022    HCT 24 (L) 07/01/2022    MCV 87 07/01/2022     07/01/2022     No results for input(s): TSH, FREET4 in the last 168 hours.  Lab Results   Component Value Date    HGBA1C 9.2 (H) 05/20/2022       Nutritional status:   Body mass index is 25.82 kg/m².  Lab Results   Component Value Date    ALBUMIN 3.6 07/01/2022    ALBUMIN 3.7 07/01/2022    ALBUMIN 3.5 07/01/2022     Lab Results   Component Value Date    PREALBUMIN 19 (L) 06/30/2022    PREALBUMIN 36 04/18/2022       Estimated Creatinine Clearance: 57.6 mL/min (A) (based on SCr of 2.1 mg/dL (H)).    Accu-Checks  Recent Labs     07/01/22  0801 07/01/22  0900 07/01/22  1002 07/01/22  1102 07/01/22  1157 07/01/22  1331 07/01/22  1407 07/01/22  1503 07/01/22  1637 07/01/22  1711   POCTGLUCOSE 113* 129* 165* 179* 193* 192* 173* 195* 176* 161*       Current Medications and/or Treatments Impacting Glycemic Control  Immunotherapy:    Immunosuppressants       None          Steroids:   Hormones (From admission, onward)                None          Pressors:    Autonomic Drugs (From admission, onward)                Start     Stop Route Frequency Ordered    06/29/22 1915  EPINEPHrine (ADRENALIN) 5 mg in dextrose 5 % 250 mL " infusion         -- IV Continuous 06/29/22 1904          Hyperglycemia/Diabetes Medications:   Antihyperglycemics (From admission, onward)                Start     Stop Route Frequency Ordered    07/01/22 1845  insulin regular in 0.9 % NaCl 100 unit/100 mL (1 unit/mL) infusion        Question:  Enter initial dose (Units/hr):  Answer:  3    -- IV Continuous 07/01/22 1733 07/01/22 1833  insulin aspart U-100 pen 0-10 Units         -- SubQ As needed (PRN) 07/01/22 1733

## 2022-07-01 NOTE — PROGRESS NOTES
Dr. Paige and DR. Vaca notified of most recent ABG results, labs, VS and UOP, new orders implemented.

## 2022-07-01 NOTE — ASSESSMENT & PLAN NOTE
Endocrinology consulted for BG management.   BG goal 140-180      Continue IV insulin infusion protocol  Requires intensive BG monitoring while on protocol     Will likely transition in AM.     ** Please notify Endocrine for any change and/or advance in diet**  ** Please call Endocrine for any BG related issues **    Discharge Planning:   TBD. Please notify endocrinology prior to discharge.

## 2022-07-01 NOTE — ASSESSMENT & PLAN NOTE
Kevan Queen is a 37yoM with a history of polysubstance abuse who presented to the hospital with decompensated heart failure. He underwent LVAD, RVAD and MVr on 6/29/22. He is admitted to the ICU post-operatively.       Neuro/Psych:   -- Sedation: Propofol. Wean today.  -- Pain: PRN fentanyl             Cards:   -- s/p LVAD, RVAD insertion and MVr   -- arrives on epi 0.06, dobutamine 2.5 for vasopressor support  -- s/p chest closure 6/30   -- MAP goal 75-85  -- Cardene for hypertension      Pulm:   -- Goal O2 sat > 90%  -- ABG PRN  -- SBT with possible extubation today  -- weaned to minimal ventilatory support  -- iNO2 @ 5ppm  -- daily CXR      Renal:  -- Keep melendez for strict I/O  -- Trend BUN/Cr   -- 2L intra-operative UOP  -- lasix gtt   -- net negative 700mL over last 24hrs      FEN / GI:   -- Replace lytes as needed  -- Nutrition: NPO  -- GI ppx: famotidine  -- Bowel reg: miralax      ID:   -- Tm: febrile to 102.7; WBC stable  -- will consult ID to discuss abx that are not nephrotoxic      Heme/Onc:   -- H/H stable   -- Daily CBC  -- no intra-op product administered  -- 2u autologous  -- 1u pRBC, 1u FFP, 500mL albumin overnight (6/29)  -- Heparin gtt      Endo:   -- BG goal 140-180  -- Insulin gtt      PPx:   Feeding: NPO  Analgesia/Sedation: Propofol / PRN Fentanyl  Thromboembolic prevention: SCDs  HOB >30: Yes  Stress Ulcer ppx: famotidine  Glucose control: Critical care goal 140-180 g/dl, ISS     Lines/Drains/Airway: CVC RIJ, Melendez, Chest tubes x 2, L brachial A-line; RIJ RVAD; LVAD      Dispo/Code Status/Palliative:   -- SICU / Full Code.

## 2022-07-01 NOTE — PROGRESS NOTES
[facility] - [facility] Intensive [facility]  Heart Transplant  Progress Note    Patient Name: Kevan Queen  MRN: [id number]  Admission Date: 6/13/2022  Hospital Length of Stay: 18 days  Attending Physician: Luis F Paige MD  Primary Care Provider: ORALIA Cline  Principal Problem:Acute on chronic combined systolic and diastolic heart failure, NYHA class 4    Subjective:     **Interval History: POD # 2 DT HM3 with MVR and RV support with ProTek Duo, POD #1 chest closure. Intubated and sedated. Follows commands with sedation breaks but becomes agitated and coughs which has led to loss of pulsatility and drop in BP. LVAD still at 5100 with flows 3.7-3.8, RVAD at 5000 with flows 3.2-3.4 - Dr.. Paige aware.  continues at 2.5 mcg, Epi at 0.06 mcg, Lasix decreased to 7.5 mg/hr with CVP now 8-9 - getting Albumin, Marcella 5 PPM, off and on Cardene and now on fixed dosed Heparin gtt. sCr climbing at 2.4, LFT's climbing. LDH also climbing at 1040. T max 102.7, Blood cxs repeated yesterday with NGTD, Cefepime continues    Continuous Infusions:   sodium chloride 0.9% 5 mL/hr at 06/27/22 0055    sodium chloride 0.9% Stopped (07/01/22 0606)    DOBUTamine IV infusion (non-titrating) 2.5 mcg/kg/min (07/01/22 0845)    EPINEPHrine 0.06 mcg/kg/min (07/01/22 0845)    furosemide (LASIX) 2 mg/mL continuous infusion (non-titrating) 7.5 mg/hr (07/01/22 0845)    heparin (porcine) in 5 % dex 400 Units/hr (07/01/22 0845)    insulin regular 1 units/mL infusion orderable (DKA) 2.7 Units/hr (07/01/22 0845)    nicardipine 3 mg/hr (07/01/22 0845)    nitric oxide gas      propofoL 25 mcg/kg/min (07/01/22 0845)     Scheduled Meds:   albumin human 5%  25 g Intravenous Once    albumin human 5%  25 g Intravenous Once    famotidine (PF)  20 mg Intravenous BID    ferrous gluconate  324 mg Oral Daily with breakfast    mupirocin   Nasal BID    polyethylene glycol  17 g Per NG tube Daily     PRN Meds:sodium chloride, sodium chloride, albumin  human 5%, albuterol sulfate, bisacodyL, dextrose 10%, dextrose 10%, fentaNYL, magnesium hydroxide 400 mg/5 ml, magnesium sulfate IVPB, oxyCODONE, oxyCODONE, potassium chloride, potassium chloride, sodium chloride 0.9%, sodium chloride 0.9%    Review of patient's allergies indicates:   Allergen Reactions    Aspirin Other (See Comments)     Mr. Thacker is enrolled in Dr. Paige's Alon Trial and cannot have any aspirin and/or aspirin-containing products. DO NOT cancel any orders for INV Aspirin 100 mg/Placebo. If you have questions, please contact Isabel @ [phone], [phone],[email or web address], secure chat or MS Teams message.    Bumex [bumetanide] Hives    Lactose Diarrhea     Other reaction(s): Abdominal distension, gaseous    Torsemide Hives     Objective:     Vital Signs (Most Recent):  Temp: 100.04 °F (37.8 °C) (07/01/22 0815)  Pulse: (!) 111 (07/01/22 0815)  Resp: (!) 23 (07/01/22 0547)  BP: (!) 78/0 (07/01/22 0738)  SpO2: 100 % (07/01/22 0815)   Vital Signs (24h Range):  Temp:  [99.86 °F (37.7 °C)-102.74 °F (39.3 °C)] 100.04 °F (37.8 °C)  Pulse:  [106-129] 111  Resp:  [22-27] 23  SpO2:  [94 %-100 %] 100 %  BP: (78)/(0) 78/0  Arterial Line BP: ()/(28-84) 82/69     Patient Vitals for the past 72 hrs (Last 3 readings):   Weight   06/29/22 0200 93.7 kg (206 lb 9.1 oz)     Body mass index is 25.82 kg/m².      Intake/Output Summary (Last 24 hours) at 7/1/2022 0848  Last data filed at 7/1/2022 0845  Gross per 24 hour   Intake 4024.62 ml   Output 4135 ml   Net -110.38 ml       Hemodynamic Parameters:       Telemetry: ST    Physical Exam  Constitutional:       Comments: Intubated and sedated   HENT:      Head: Normocephalic and atraumatic.   Eyes:      Conjunctiva/sclera: Conjunctivae normal.      Pupils: Pupils are equal, round, and reactive to light.   Neck:      Comments: APOORVA TLC, LINDSAY Clay  Cardiovascular:      Rate and Rhythm: Regular rhythm. Tachycardia present.      Comments: Smooth VAD  hum  Pulmonary:      Breath sounds: Normal breath sounds.      Comments: Intubated on ventilator  Abdominal:      Palpations: Abdomen is soft.      Comments: Hypoactive bowel sounds   Musculoskeletal:         General: No swelling.      Cervical back: Neck supple.   Skin:     General: Skin is dry.      Capillary Refill: Capillary refill takes 2 to 3 seconds.      Comments: Extremities cool to touch with fans   Neurological:      Comments: Intubated and sedated       Significant Labs:  CBC:  Recent Labs   Lab 06/30/22 2015 06/30/22 2022 07/01/22  0000 07/01/22  0009 07/01/22  0354 07/01/22  0411   WBC 29.72*  --  26.94*  --   --  27.53*   RBC 2.97*  --  2.83*  --   --  2.93*   HGB 8.8*  --  8.2*  --   --  8.7*   HCT 25.7*   < > 24.5* 26* 26* 25.9*     --  224  --   --  216   MCV 87  --  87  --   --  88   MCH 29.6  --  29.0  --   --  29.7   MCHC 34.2  --  33.5  --   --  33.6    < > = values in this interval not displayed.     BNP:  Recent Labs   Lab 06/28/22  0921 07/01/22  0411   * 520*     CMP:  Recent Labs   Lab 07/01/22  0000 07/01/22  0412 07/01/22  0802   * 196* 115*   CALCIUM 8.9 9.0 9.0   ALBUMIN 3.4* 3.5  3.5 3.5   PROT 6.5 6.8  6.8 6.8    137 137   K 3.9 4.1 4.2   CO2 22* 21* 22*    103 104   BUN 53* 54* 55*   CREATININE 2.2* 2.4* 2.3*   ALKPHOS 92 87  87 90   ALT 42 58*  58* 86*   * 185*  185* 237*   BILITOT 2.1* 2.1*  2.1* 2.0*      Coagulation:   Recent Labs   Lab 07/01/22  0000 07/01/22  0412 07/01/22  0802   INR 1.2 1.2 1.2   APTT 24.5 24.9 24.9     LDH:  Recent Labs   Lab 06/29/22  0214 06/29/22  1809 06/30/22  0400 07/01/22  0412   * 789* 798*  798* 1,040*     Microbiology:  Microbiology Results (last 7 days)       Procedure Component Value Units Date/Time    Blood culture [[id number]] Collected: 06/30/22 1836    Order Status: Completed Specimen: Blood from Peripheral, Wrist, Left Updated: 07/01/22 0145     Blood Culture, Routine No Growth to date     Blood culture [[id number]] Collected: 06/30/22 1833    Order Status: Completed Specimen: Blood from Peripheral, Forearm, Right Updated: 07/01/22 0145     Blood Culture, Routine No Growth to date    Blood culture [[id number]] Collected: 06/25/22 0634    Order Status: Completed Specimen: Blood from Peripheral, Wrist, Left Updated: 06/30/22 0812     Blood Culture, Routine No growth after 5 days.    Blood culture [[id number]] Collected: 06/25/22 0633    Order Status: Completed Specimen: Blood from Peripheral, Antecubital, Right Updated: 06/30/22 0812     Blood Culture, Routine No growth after 5 days.    Blood culture [[id number]] Collected: 06/23/22 0105    Order Status: Completed Specimen: Blood from Peripheral, Antecubital, Right Updated: 06/28/22 0612     Blood Culture, Routine No growth after 5 days.    Blood culture [[id number]]  (Abnormal)  (Susceptibility) Collected: 06/23/22 0108    Order Status: Completed Specimen: Blood from Peripheral, Hand, Right Updated: 06/27/22 1116     Blood Culture, Routine Gram stain dudley bottle: Gram positive cocci in clusters resembling Staph       Results called to and read back by: Mark Pickett RN  06/24/2022        14:36      STAPHYLOCOCCUS EPIDERMIDIS    Blood culture [[id number]]  (Abnormal)  (Susceptibility) Collected: 06/21/22 2250    Order Status: Completed Specimen: Blood from Peripheral, Wrist, Left Updated: 06/26/22 1059     Blood Culture, Routine Gram stain dudley bottle: Gram positive cocci in clusters resembling Staph      Results called to and read back by: Rosa Pardo RN. 06/22/2022  23:29      Gram stain aer bottle: Gram positive cocci in clusters resembling Staph       Positive results previously called 06/23/2022  04:33      STAPHYLOCOCCUS EPIDERMIDIS    Blood culture [[id number]]  (Abnormal)  (Susceptibility) Collected: 06/21/22 2250    Order Status: Completed Specimen: Blood from Peripheral, Wrist, Left Updated: 06/25/22 1029     Blood Culture, Routine Gram stain  "dudley bottle: Gram positive cocci      Results called to and read back by: Arnulfo Herrera RN. 06/22/2022  17:46      Gram stain aer bottle: Gram positive cocci in clusters resembling Staph       Positive results previously called 06/23/2022  00:11      STAPHYLOCOCCUS EPIDERMIDIS    Blood culture [[id number]]  (Abnormal)  (Susceptibility) Collected: 06/20/22 1651    Order Status: Completed Specimen: Blood from Peripheral, Hand, Left Updated: 06/25/22 1023     Blood Culture, Routine Gram stain dudley bottle: Gram positive cocci in clusters resembling Staph      Results called to and read back by:Tara López RN 06/21/2022  22:16      STAPHYLOCOCCUS EPIDERMIDIS            I have reviewed all pertinent labs within the past 24 hours.    Estimated Creatinine Clearance: 52.6 mL/min (A) (based on SCr of 2.3 mg/dL (H)).    Diagnostic Results:  I have reviewed and interpreted all pertinent imaging results/findings within the past 24 hours.    Assessment and Plan:     36 yo male with NIDCM with BiV systolic heart failure, on home Milrinone at 0.375 mcg/kg/min, presented at [facility] 4/2/22 ,was not evaluated for OHT as he has recently quit smoking in April 2022 but was approved for VAD with plan to begin OHT evaluation in upcoming months if Mr Queen is stable and suitable for OHT eval (blood group A), 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 Went to ED at [facility] 6/10 but left after waiting 4 hours. Had clinic appointment with us today, but arrived to [facility] early this morning and decided to go to the ED instead. Baseline Lasix dose is 80 mg bid. Reports taking 240 mg qd past 3 days with no improvement. BNP " is 1701, up from 898 on 6/2 and 49 on 5/24. sCr is 1.8 with baseline ~ 1.5-1.7. sPO2 on RA is 93%. Wife at bedside    He has been given Lasix 80 mg IVP in the ED with plan to start Lasix gtt at 20 mg/hr           * Acute on chronic combined systolic and diastolic heart failure, NYHA class 4  - [facility]  - Approved for LVAD at [facility] on 4/2/22. Was not evaluated for OHTx as he quit smoking in April 2022  - See LVAD    Unlikely OHTx candidate with smoking history (quit April 2022)     LVAD (left ventricular assist device) present  -S/P DT HM3 with MVR plus Protek Duo for RV support 6/29 (Grecia)  -CTS primary  -LVAD speed 5000 with flows 3.7-3.8, RVAD speed 5100 with flows 3.2-3.4 - Grecia aware  -Requiring minimal Pressor support with Epi 0.06. On  2.5 and Marcella 5 ppm. Cardene prn to keep MAP 75-80  -CVP 8-9 - Lasix gtt decreased to 7.5 and getting Albumin  -Post op antibiotic per CTS (h/o Staph epi bacteremia, cleareed by ID for surgery with rec to continue antibiotic coverage for 2 weeks post op)  -AC per CTS      Procedure: Device Interrogation Including analysis of device parameters  Current Settings: Ventricular Assist Device  Review of device function is stable    TXP LVAD INTERROGATIONS 7/1/2022 7/1/2022 7/1/2022 7/1/2022 7/1/2022 7/1/2022 7/1/2022   Type HeartMate3 HeartMate3 HeartMate3 HeartMate3 HeartMate3 HeartMate3 HeartMate3   Flow 3.8 3.7 3.8 3.8 3.9 3.8 3.9   Speed 5000 5000 5000 5000 5000 5000 5000   PI 5.7 5.5 5.6 5.7 5.2 5.6 5.5   Power (Serna) 3.4 3.5 3.4 3.4 3.4 3.3 3.4   LSL - - 4600 4600 4600 4600 4600   Pulsatility - - Pulse Pulse Pulse Pulse Pulse       Staphylococcus epidermidis bacteremia  -Blood cultures 6/20 and repeat 6/21 positive for Staph Epi. One of two blood cultures from 6/23 positive for Staph (taken prior to line exchange). Repeat cultures sent 6/25 NGTD.   -IJ exchanged on 6/23, IABP exchanged 6/23  -Appreciate ID's help again:   · Currently on IV Cefepime per CTS  · ID  recommend a 14 day course of antibiotics from VAD implantation on 6/29. End date: 7/12/22  · Blood cxs 6/26 -ve. Blood cxs repeated yesterday 2/2 temp spike, climbing WCC  · No absolute contraindications from ID standpoint to proceed with VAD implant on 6/29 if blood cultures remain no growth.            Uncontrolled diabetes mellitus  -Admitted 5/24-5/27 with hyperglycemia hyperosmolar syndrome  -Hgb A1C 9.2 on 5/20/22  -Endocrine following, on insulin gtt    CKD (chronic kidney disease) stage 3, GFR 30-59 ml/min  - Admit sCr 1.8, baseline ~ 1.5-1.7  - POD #2 sCr 2.4  - -150 cc/hr on Lasix 7.5 mg/hr    Cardiogenic shock  -See LVAD      Uninterrupted Critical Care/Counseling Time (not including procedures): 60 [place] [id number]  Heart Transplant  [facility] - Surgical Intensive Care

## 2022-07-01 NOTE — PROGRESS NOTES
Pt in bed awake and alert on BiV support, recently extubated, fiance and mother at bedside.  LVAD history interrogated with few abnormal events noted, PI events w/ speed drops that bedside RN coincide with coughing spell while still intubated.  LVAD parameters currently WNL. Patient is not appropriate for VAD education at this time.  Encouraged pt and family to notify nurse if they have any questions, problems or concerns for LVAD coordinator.  Pt verbalized understanding and in agreement of plan. Will follow up with pt soon.

## 2022-07-01 NOTE — SUBJECTIVE & OBJECTIVE
"Interval HPI:   Overnight events: Remains in ICU. NAEON. LVAD. OR for closure today. BG at or above goal; insulin infusion up to 10.5 u/hr. Creatinine 2.5.Day of Surgery  Eating:  Diet NPO  Nausea: No  Hypoglycemia and intervention: No  Fever: No  TPN and/or TF: No    /61   Pulse (!) 123   Temp (!) 102.38 °F (39.1 °C)   Resp (!) 23   Ht 6' 3" (1.905 m)   Wt 93.7 kg (206 lb 9.1 oz)   SpO2 100%   BMI 25.82 kg/m²     Labs Reviewed and Include    Recent Labs   Lab 06/30/22 2015   *   CALCIUM 8.8   ALBUMIN 3.6   PROT 6.7      K 4.0   CO2 21*      BUN 53*   CREATININE 2.3*   ALKPHOS 91   ALT 38   *   BILITOT 2.0*     Lab Results   Component Value Date    WBC 29.72 (H) 06/30/2022    HGB 8.8 (L) 06/30/2022    HCT 27 (L) 06/30/2022    MCV 87 06/30/2022     06/30/2022     No results for input(s): TSH, FREET4 in the last 168 hours.  Lab Results   Component Value Date    HGBA1C 9.2 (H) 05/20/2022       Nutritional status:   Body mass index is 25.82 kg/m².  Lab Results   Component Value Date    ALBUMIN 3.6 06/30/2022    ALBUMIN 3.2 (L) 06/30/2022    ALBUMIN 3.3 (L) 06/30/2022     Lab Results   Component Value Date    PREALBUMIN 19 (L) 06/30/2022    PREALBUMIN 36 04/18/2022       Estimated Creatinine Clearance: 52.6 mL/min (A) (based on SCr of 2.3 mg/dL (H)).    Accu-Checks  Recent Labs     06/30/22  1003 06/30/22  1322 06/30/22  1413 06/30/22  1508 06/30/22  1607 06/30/22  1709 06/30/22  1800 06/30/22  1924 06/30/22  2018 06/30/22  2114   POCTGLUCOSE 214* 284* 223* 200* 145* 140* 124* 160* 180* 191*       Current Medications and/or Treatments Impacting Glycemic Control  Immunotherapy:    Immunosuppressants       None          Steroids:   Hormones (From admission, onward)                None          Pressors:    Autonomic Drugs (From admission, onward)                Start     Stop Route Frequency Ordered    06/29/22 1915  EPINEPHrine (ADRENALIN) 5 mg in dextrose 5 % 250 mL infusion "         -- IV Continuous 06/29/22 1904          Hyperglycemia/Diabetes Medications:   Antihyperglycemics (From admission, onward)                Start     Stop Route Frequency Ordered    06/29/22 1645  insulin regular in 0.9 % NaCl 100 unit/100 mL (1 unit/mL) infusion        Question:  Enter initial dose from Infusion Protocol Chart (Units/hr):  Answer:  1    -- IV Continuous 06/29/22 1543

## 2022-07-01 NOTE — PROGRESS NOTES
"[facility] - Surgical Intensive Care  Endocrinology  Progress Note    Admit Date: 6/13/2022     Reason for Consult: Management of  type 2 DM, Hyperglycemia     Surgical Procedure and Date: none    Diabetes diagnosis year: 4/21/21    Home Diabetes Medications:  Detemir  23  units daily and Aspart   12  units TIDWM and  Mod dose correction insulin    Lab Results   Component Value Date    HGBA1C 9.2 (H) 05/20/2022           How often checking glucose at home? 1-3 x day   BG readings on regimen: 180- up to 300 once  Hypoglycemia on the regimen?  yes once- related to not really eating after taking aspart   Missed doses on regimen?  no    Diabetes Complications include:     Hyperglycemia    Complicating diabetes co morbidities:   History of MI, CHF, and CKD      HPI:   Patient is a 37 y.o. male with a diagnosis of type 2 DM and NIDCM with BiV systolic heart failure. [facility]  and was  approved for VAD. Also recently admitted with [facility]; he had clinic appointment last week to f/u recent admit for hyperglycemic hyperosmolar syndrome but did not come as he was "feeling too bad" presents to  ED with SOB. Endocrinology consulted for BG/ DM management.                Interval HPI:   Overnight events: Remains in ICU. Extubated. BG reasonably well controlled on IV insulin infusoin 1.7 - 4.7 units today. 1 Day Post-Op LVAD  Eating:  Diet full liquid Fluid - 1500mL    Nausea: No  Hypoglycemia and intervention: No  Fever: No  TPN and/or TF: No      BP (!) 78/0   Pulse (!) 119   Temp (!) 100.94 °F (38.3 °C)   Resp (!) 35   Ht 6' 3" (1.905 m)   Wt 93.7 kg (206 lb 9.1 oz)   SpO2 100%   BMI 25.82 kg/m²     Labs Reviewed and Include    Recent Labs   Lab 07/01/22  1639   *   CALCIUM 9.1   ALBUMIN 3.6   PROT 6.8      K 3.9   CO2 23      BUN 59*   CREATININE 2.1*   ALKPHOS 86   *   *   BILITOT 2.4*     Lab Results   Component Value Date    WBC 28.29 (H) 07/01/2022    HGB " 8.0 (L) 07/01/2022    HCT 24 (L) 07/01/2022    MCV 87 07/01/2022     07/01/2022     No results for input(s): TSH, FREET4 in the last 168 hours.  Lab Results   Component Value Date    HGBA1C 9.2 (H) 05/20/2022       Nutritional status:   Body mass index is 25.82 kg/m².  Lab Results   Component Value Date    ALBUMIN 3.6 07/01/2022    ALBUMIN 3.7 07/01/2022    ALBUMIN 3.5 07/01/2022     Lab Results   Component Value Date    PREALBUMIN 19 (L) 06/30/2022    PREALBUMIN 36 04/18/2022       Estimated Creatinine Clearance: 57.6 mL/min (A) (based on SCr of 2.1 mg/dL (H)).    Accu-Checks  Recent Labs     07/01/22  0801 07/01/22  0900 07/01/22  1002 07/01/22  1102 07/01/22  1157 07/01/22  1331 07/01/22  1407 07/01/22  1503 07/01/22  1637 07/01/22  1711   POCTGLUCOSE 113* 129* 165* 179* 193* 192* 173* 195* 176* 161*       Current Medications and/or Treatments Impacting Glycemic Control  Immunotherapy:    Immunosuppressants       None          Steroids:   Hormones (From admission, onward)                None          Pressors:    Autonomic Drugs (From admission, onward)                Start     Stop Route Frequency Ordered    06/29/22 1915  EPINEPHrine (ADRENALIN) 5 mg in dextrose 5 % 250 mL infusion         -- IV Continuous 06/29/22 1904          Hyperglycemia/Diabetes Medications:   Antihyperglycemics (From admission, onward)                Start     Stop Route Frequency Ordered    07/01/22 1845  insulin regular in 0.9 % NaCl 100 unit/100 mL (1 unit/mL) infusion        Question:  Enter initial dose (Units/hr):  Answer:  3    -- IV Continuous 07/01/22 1733    07/01/22 1833  insulin aspart U-100 pen 0-10 Units         -- SubQ As needed (PRN) 07/01/22 1733            ASSESSMENT and PLAN    * Acute on chronic combined systolic and diastolic heart failure, NYHA class 4  Optimize glucose control and  avoid hypoglycemia    Managed per primary.       Uncontrolled diabetes mellitus  Endocrinology consulted for BG management.   BG  goal 140-180      Change to transition insulin infusion at 3 u/hr  BG monitoring ac/hs/0200 and moderate dose correction scale.     ** Please notify Endocrine for any change and/or advance in diet**  ** Please call Endocrine for any BG related issues **    Discharge Planning:   TBD. Please notify endocrinology prior to discharge.        Surgical wound present  Optimize BG for surgical wound healing.         CKD (chronic kidney disease) stage 3, GFR 30-59 ml/min    Titrate insulin slowly to avoid hypoglycemia as the risk of hypoglycemia increases with decreased creatinine clearance.    Estimated Creatinine Clearance: 57.6 mL/min (A) (based on SCr of 2.1 mg/dL (H)).          Yumiko Fowler NP  Endocrinology  [facility] - Surgical Intensive Care

## 2022-07-01 NOTE — CONSULTS
"[facility]  Adult Nutrition  Consult Note    SUMMARY     Recommendations    1. ADAT to diabetic diet - fluid per physician.     2. When medically feasible, continue Boost glucose control TID.     3. RD to monitor and f/u/     Goals: Continue to meet % EEN/EPN by RD f/u  Nutrition Goal Status: progressing towards goal  Communication of RD Recs: other (POC)    Assessment and Plan  Nutrition Problem  Increased protein needs      Related to (etiology):   Physiological values      Signs and Symptoms (as evidenced by):   LVAD w/u     Interventions (treatment strategy):  Collaboration of nutrition care w/ other providers      Nutrition Diagnosis Status:   Continues    Reason for Assessment    Reason For Assessment: RD follow-up, consult  Diagnosis: cardiac disease  Relevant Medical History: CHF, DM, cardiomyopathy  Interdisciplinary Rounds: attended    General Information Comments: Pt s/p LVAD placement 6/29 and s/p wound closure 7/01. Pt remains intubated/sedated, though noted possible extubation today. During previous visit, pt reports good appetite/PO intake, eating 100% of meals. NFPE completed 6/22, no s/s of malnutrition at this time. Of note - diabetic diet education provided 6/27. Will assess knowledge and answer and questions pt may have at f/u.     Nutrition Discharge Planning: diabetic diet    Nutrition Risk Screen    Nutrition Risk Screen: no indicators present    Nutrition/Diet History    Spiritual, Cultural Beliefs, Latter day Practices, Values that Affect Care: no  Food Allergies:  (lactose)    Anthropometrics    Temp: 99.68 °F (37.6 °C)  Height Method: Stated  Height: 6' 3" (190.5 cm)  Height (inches): 75 in  Weight Method:  (COLLIN, bed broken)  Weight: 93.7 kg (206 lb 9.1 oz)  Weight (lb): 206.57 lb  Ideal Body Weight (IBW), Male: 196 lb  % Ideal Body Weight, Male (lb): 105.1 %  BMI (Calculated): 25.8  BMI Grade: 25 - 29.9 - overweight    Lab/Procedures/Meds    Pertinent Labs " Reviewed: reviewed  Pertinent Labs Comments: BUN 23, Crea 2.3, GFR 40.4, Glu 115, Phos 4.6  Pertinent Medications Reviewed: reviewed  Pertinent Medications Comments: albumin human, ferrous gluconate, epinephrine, furosemide, insulin, propofol    Estimated/Assessed Needs    Weight Used For Calorie Calculations: 93.7 kg (206 lb 9.1 oz)  Energy Calorie Requirements (kcal): 9977-3528  Energy Need Method: Kcal/kg (25-30 kcal/kg)  Protein Requirements: 116g (1.2g/kg)  Weight Used For Protein Calculations: 96.2 kg (212 lb 1.3 oz)  RDA Method (mL): 2343  CHO Requirement: 271    Nutrition Prescription Ordered    Current Diet Order: NPO    Evaluation of Received Nutrient/Fluid Intake    I/O: -24.4L since 6/16  Energy Calories Required: not meeting needs  Protein Required: not meeting needs  Fluid Required: not meeting needs  Total Fluid Intake (mL/kg): 1 ml or fluid per MD  Comments: LBM 6/27  Tolerance: tolerating  % Intake of Estimated Energy Needs: 0 - 25 %  % Meal Intake: NPO    Nutrition Risk    Level of Risk/Frequency of Follow-up:  (1x/week)     Monitor and Evaluation    Food and Nutrient Intake: energy intake, food and beverage intake  Food and Nutrient Adminstration: diet order  Knowledge/Beliefs/Attitudes: beliefs and attitudes, food and nutrition knowledge/skill  Physical Activity and Function: nutrition-related ADLs and IADLs, factors affecting access to physical activity  Anthropometric Measurements: height/length, weight, weight change, body mass index, growth pattern indices/percentile ranks  Biochemical Data, Medical Tests and Procedures: electrolyte and renal panel, gastrointestinal profile, glucose/endocrine profile, inflammatory profile, lipid profile  Nutrition-Focused Physical Findings: overall appearance, extremities, muscles and bones, head and eyes, skin     Nutrition Follow-Up    RD Follow-up?: Yes

## 2022-07-01 NOTE — PROGRESS NOTES
Pt intubated and sedated, chest closed earlier today, Niharika herrera at bedside.  LVAD history interrogated with no abnormal events noted.  LVAD parameters WNL. Family denies any needs at this time. Encouraged family to notify nurse if they have any questions, problems or concerns for LVAD coordinator.  Verbalized understanding and in agreement of plan. Will follow up with pt soon.

## 2022-07-01 NOTE — PT/OT/SLP EVAL
Speech Language Pathology Evaluation  Bedside Swallow    Patient Name:  Kevan Queen   MRN:  [id number]  Admitting Diagnosis: Acute on chronic combined systolic and diastolic heart failure, NYHA class 4    Recommendations:                 General Recommendations:  Ongoing swallow assessment  Diet recommendations:   , Full liquids   Aspiration Precautions: Cue for slow rate of PO intake, 1 bite/sip at a time, Feed only when awake/alert, HOB to 90 degrees, Meds crushed in puree, Monitor for s/s of aspiration, Small bites/sips and Strict aspiration precautions   General Precautions: Standard, fall, aspiration, LVAD  Communication strategies:  go to room if call light pushed    History:     Past Medical History:   Diagnosis Date    Arthritis     Cardiomyopathy     CHF (congestive heart failure) 10/01/2020    Diabetes mellitus     Dilated cardiomyopathy 10/26/2020    Drug abuse 10/2020    Hyperosmolar hyperglycemic state (HHS) 5/25/2022    ICD (implantable cardioverter-defibrillator) in place 10/26/2020    Muscle cramping 6/15/2022    Renal disorder        Past Surgical History:   Procedure Laterality Date    APPLICATION OF WOUND VACUUM-ASSISTED CLOSURE DEVICE N/A 6/30/2022    Procedure: APPLICATION, WOUND VAC;  Surgeon: Luis F Paige MD;  Location: [facility] OR [address] [place] [address];  Service: Cardiovascular;  Laterality: N/A;  50 x 5 cm    CARDIAC DEFIBRILLATOR PLACEMENT      IMPLANTATION OF RIGHT VENTRICULAR ASSIST DEVICE (RVAD) N/A 6/29/2022    Procedure: INSERTION, RVAD;  Surgeon: Luis F Paige MD;  Location: [facility] OR [address] [place] [address];  Service: Cardiovascular;  Laterality: N/A;    INSERTION OF GRAFT TO PERICARDIUM Right 6/30/2022    Procedure: INSERTION-RIGHT VENTRICULAR ASSIST DEVICE;  Surgeon: Luis F Paige MD;  Location: [facility] OR [address] [place] [address];  Service: Cardiovascular;  Laterality: Right;    IRRIGATION OF MEDIASTINUM  6/30/2022    Procedure: IRRIGATION, MEDIASTINUM;  Surgeon: Luis F Paige MD;  Location: [facility] OR [address] [place] [address];   "Service: Cardiovascular;;    LEFT VENTRICULAR ASSIST DEVICE Left 6/23/2022    Procedure: INSERTION-LEFT VENTRICULAR ASSIST DEVICE;  Surgeon: Luis F Paige MD;  [facility];  Service: Cardiovascular;  Laterality: Left;    LEFT VENTRICULAR ASSIST DEVICE N/A 6/29/2022    Procedure: INSERTION-LEFT VENTRICULAR ASSIST DEVICE;  Surgeon: Luis F Paige MD;  Location: [facility] OR [facility] F[facility];  Service: Cardiovascular;  Laterality: N/A;    RIGHT HEART CATHETERIZATION Right 4/8/2022    Procedure: INSERTION, CATHETER, RIGHT HEART;  Surgeon: Luca Lopez Jr., MD;  Location: [facility] CATH LAB;  Service: Cardiology;  Laterality: Right;    RIGHT HEART CATHETERIZATION Right 4/19/2022    Procedure: INSERTION, CATHETER, RIGHT HEART;  Surgeon: Josh Pulido MD;  Location: [facility] CATH LAB;  Service: Cardiology;  Laterality: Right;    STERNAL WOUND CLOSURE N/A 6/30/2022    Procedure: CLOSURE, WOUND, STERNUM;  Surgeon: Luis F Paige MD;  [facility];  Service: Cardiovascular;  Laterality: N/A;     Prior Intubation HX:  6/29/22 - 7/1/22    Modified Barium Swallow: None on file    Chest X-Rays 6/30/22: No focal infiltrates, pleural fluid, or pneumothorax.    Prior diet: reg/thin    Subjective     Patient awake and a;lert  "I'm just so thirsty."  Communicated with nurse prior to session     Pain/Comfort:  · Pain Rating 1: 0/10  · Pain Rating Post-Intervention 1: 0/10    Respiratory Status: Nasal cannula, flow 4 L/min    Objective:     Oral Musculature Evaluation  · Oral Musculature: WFL  · Dentition: present and adequate  · Secretion Management: adequate  · Mucosal Quality: good  · Mandibular Strength and Mobility: WFL  · Oral Labial Strength and Mobility: WFL  · Lingual Strength and Mobility: WFL  · Volitional Cough: adequate  · Volitional Swallow: timely; good laryngeal elevation  · Voice Prior to PO Intake: mildly hoarse with reduced intensity    Bedside Swallow Eval:   Consistencies Assessed:  · Thin " liquids x8 (via cup-edge and straw)  · Puree x4 (tsp applesauce)     Oral Phase:   · WFL    Pharyngeal Phase:   · no overt clinical signs/symptoms of aspiration  · no overt clinical signs/symptoms of pharyngeal dysphagia  · Increased WOB as trials progressed, but O2 remained at 100%    Compensatory Strategies  · None    Treatment: Patient sitting up in bed with mother at bedside and fiance sleeping in room. Patient presented with baseline speech deficits. SLP educated patient and mother regarding recs. Patient left in bed with call light within reach. Recs communicated to RN.    Assessment:     Kevan Queen is a 37 y.o. male with an SLP diagnosis of Dysphonia. Patient presents with a safe oropharyngeal swallow on trials assessed, but he remains a significant aspiration risk 2' to weakness and impulsivity. ST to continue to follow.      Goals:   Multidisciplinary Problems     SLP Goals        Problem: SLP    Goal Priority Disciplines Outcome   SLP Goal     SLP    Description: Speech-Language Pathology Goals  Goals to be met by 7/8/22  1. Patient will participate in ongoing swallow assessment in order to determine safest least restrictive diet.                    Plan:     · Patient to be seen:  4 x/week   · Plan of Care expires:  07/31/22  · Plan of Care reviewed with:  patient, mother   · SLP Follow-Up:  Yes       Discharge recommendations:   (pending)   Barriers to Discharge:  Level of Skilled Assistance Needed     Time Tracking:     SLP Treatment Date:   07/01/22  Speech Start Time:  1453  Speech Stop Time:  1509     Speech Total Time (min):  16 min    Billable Minutes: Eval Swallow and Oral Function 8 and Self Care/Home Management Training 8    Prasad Mejía CCC-SLP  Speech-Language Pathology  Pager: [phone]   07/01/2022

## 2022-07-01 NOTE — ASSESSMENT & PLAN NOTE
Titrate insulin slowly to avoid hypoglycemia as the risk of hypoglycemia increases with decreased creatinine clearance.    Estimated Creatinine Clearance: 52.6 mL/min (A) (based on SCr of 2.3 mg/dL (H)).

## 2022-07-01 NOTE — ASSESSMENT & PLAN NOTE
-S/P DT HM3 with MVR plus Protek Duo for RV support 6/29 (Grecia)  -CTS primary  -LVAD speed 5000 with flows 3.7-3.8, RVAD speed 5100 with flows 3.2-3.4 - Grecia aware  -Requiring minimal Pressor support with Epi 0.06. On  2.5 and Marcella 5 ppm. Cardene prn to keep MAP 75-80  -CVP 8-9 - Lasix gtt decreased to 7.5 and getting Albumin  -Post op antibiotic per CTS (h/o Staph epi bacteremia, cleareed by ID for surgery with rec to continue antibiotic coverage for 2 weeks post op)  -AC per CTS      Procedure: Device Interrogation Including analysis of device parameters  Current Settings: Ventricular Assist Device  Review of device function is stable    TXP LVAD INTERROGATIONS 7/1/2022 7/1/2022 7/1/2022 7/1/2022 7/1/2022 7/1/2022 7/1/2022   Type HeartMate3 HeartMate3 HeartMate3 HeartMate3 HeartMate3 HeartMate3 HeartMate3   Flow 3.8 3.7 3.8 3.8 3.9 3.8 3.9   Speed 5000 5000 5000 5000 5000 5000 5000   PI 5.7 5.5 5.6 5.7 5.2 5.6 5.5   Power (Serna) 3.4 3.5 3.4 3.4 3.4 3.3 3.4   LSL - - 4600 4600 4600 4600 4600   Pulsatility - - Pulse Pulse Pulse Pulse Pulse

## 2022-07-02 LAB
ALBUMIN SERPL BCP-MCNC: 3.3 G/DL (ref 3.5–5.2)
ALBUMIN SERPL BCP-MCNC: 3.5 G/DL (ref 3.5–5.2)
ALBUMIN SERPL BCP-MCNC: 3.6 G/DL (ref 3.5–5.2)
ALLENS TEST: ABNORMAL
ALLENS TEST: ABNORMAL
ALP SERPL-CCNC: 111 U/L (ref 55–135)
ALP SERPL-CCNC: 115 U/L (ref 55–135)
ALP SERPL-CCNC: 87 U/L (ref 55–135)
ALP SERPL-CCNC: 88 U/L (ref 55–135)
ALP SERPL-CCNC: 90 U/L (ref 55–135)
ALP SERPL-CCNC: 91 U/L (ref 55–135)
ALT SERPL W/O P-5'-P-CCNC: 757 U/L (ref 10–44)
ALT SERPL W/O P-5'-P-CCNC: 853 U/L (ref 10–44)
ALT SERPL W/O P-5'-P-CCNC: 946 U/L (ref 10–44)
ALT SERPL W/O P-5'-P-CCNC: 948 U/L (ref 10–44)
ALT SERPL W/O P-5'-P-CCNC: 956 U/L (ref 10–44)
ALT SERPL W/O P-5'-P-CCNC: 988 U/L (ref 10–44)
ANION GAP SERPL CALC-SCNC: 13 MMOL/L (ref 8–16)
ANION GAP SERPL CALC-SCNC: 15 MMOL/L (ref 8–16)
ANISOCYTOSIS BLD QL SMEAR: SLIGHT
ANISOCYTOSIS BLD QL SMEAR: SLIGHT
APTT BLDCRRT: 25.1 SEC (ref 21–32)
APTT BLDCRRT: 26 SEC (ref 21–32)
APTT BLDCRRT: 27.1 SEC (ref 21–32)
APTT BLDCRRT: 27.7 SEC (ref 21–32)
APTT BLDCRRT: 28 SEC (ref 21–32)
APTT BLDCRRT: 29.1 SEC (ref 21–32)
AST SERPL-CCNC: 1025 U/L (ref 10–40)
AST SERPL-CCNC: 1246 U/L (ref 10–40)
AST SERPL-CCNC: 1293 U/L (ref 10–40)
AST SERPL-CCNC: 1507 U/L (ref 10–40)
AST SERPL-CCNC: 1700 U/L (ref 10–40)
AST SERPL-CCNC: 1715 U/L (ref 10–40)
BASOPHILS # BLD AUTO: 0.03 K/UL (ref 0–0.2)
BASOPHILS # BLD AUTO: 0.03 K/UL (ref 0–0.2)
BASOPHILS # BLD AUTO: 0.04 K/UL (ref 0–0.2)
BASOPHILS # BLD AUTO: 0.04 K/UL (ref 0–0.2)
BASOPHILS NFR BLD: 0.1 % (ref 0–1.9)
BASOPHILS NFR BLD: 0.2 % (ref 0–1.9)
BILIRUB SERPL-MCNC: 2 MG/DL (ref 0.1–1)
BILIRUB SERPL-MCNC: 2.2 MG/DL (ref 0.1–1)
BILIRUB SERPL-MCNC: 2.3 MG/DL (ref 0.1–1)
BILIRUB SERPL-MCNC: 2.4 MG/DL (ref 0.1–1)
BILIRUB SERPL-MCNC: 2.4 MG/DL (ref 0.1–1)
BILIRUB SERPL-MCNC: 2.5 MG/DL (ref 0.1–1)
BLD PROD TYP BPU: NORMAL
BLOOD UNIT EXPIRATION DATE: NORMAL
BLOOD UNIT TYPE CODE: 6200
BLOOD UNIT TYPE: NORMAL
BUN SERPL-MCNC: 62 MG/DL (ref 6–20)
BUN SERPL-MCNC: 63 MG/DL (ref 6–20)
BUN SERPL-MCNC: 64 MG/DL (ref 6–20)
BUN SERPL-MCNC: 64 MG/DL (ref 6–20)
BUN SERPL-MCNC: 67 MG/DL (ref 6–20)
BUN SERPL-MCNC: 67 MG/DL (ref 6–20)
CALCIUM SERPL-MCNC: 8.7 MG/DL (ref 8.7–10.5)
CALCIUM SERPL-MCNC: 8.8 MG/DL (ref 8.7–10.5)
CALCIUM SERPL-MCNC: 9 MG/DL (ref 8.7–10.5)
CALCIUM SERPL-MCNC: 9.1 MG/DL (ref 8.7–10.5)
CALCIUM SERPL-MCNC: 9.2 MG/DL (ref 8.7–10.5)
CALCIUM SERPL-MCNC: 9.3 MG/DL (ref 8.7–10.5)
CHLORIDE SERPL-SCNC: 101 MMOL/L (ref 95–110)
CHLORIDE SERPL-SCNC: 101 MMOL/L (ref 95–110)
CHLORIDE SERPL-SCNC: 102 MMOL/L (ref 95–110)
CHLORIDE SERPL-SCNC: 103 MMOL/L (ref 95–110)
CHLORIDE SERPL-SCNC: 99 MMOL/L (ref 95–110)
CHLORIDE SERPL-SCNC: 99 MMOL/L (ref 95–110)
CK SERPL-CCNC: 1995 U/L (ref 20–200)
CO2 SERPL-SCNC: 20 MMOL/L (ref 23–29)
CO2 SERPL-SCNC: 21 MMOL/L (ref 23–29)
CO2 SERPL-SCNC: 21 MMOL/L (ref 23–29)
CO2 SERPL-SCNC: 23 MMOL/L (ref 23–29)
CO2 SERPL-SCNC: 23 MMOL/L (ref 23–29)
CO2 SERPL-SCNC: 24 MMOL/L (ref 23–29)
CODING SYSTEM: NORMAL
CREAT SERPL-MCNC: 1.8 MG/DL (ref 0.5–1.4)
CREAT SERPL-MCNC: 2.1 MG/DL (ref 0.5–1.4)
DACRYOCYTES BLD QL SMEAR: ABNORMAL
DACRYOCYTES BLD QL SMEAR: ABNORMAL
DELSYS: ABNORMAL
DELSYS: ABNORMAL
DIFFERENTIAL METHOD: ABNORMAL
DISPENSE STATUS: NORMAL
EOSINOPHIL # BLD AUTO: 0 K/UL (ref 0–0.5)
EOSINOPHIL NFR BLD: 0 % (ref 0–8)
ERYTHROCYTE [DISTWIDTH] IN BLOOD BY AUTOMATED COUNT: 15.1 % (ref 11.5–14.5)
ERYTHROCYTE [DISTWIDTH] IN BLOOD BY AUTOMATED COUNT: 15.1 % (ref 11.5–14.5)
ERYTHROCYTE [DISTWIDTH] IN BLOOD BY AUTOMATED COUNT: 15.2 % (ref 11.5–14.5)
ERYTHROCYTE [DISTWIDTH] IN BLOOD BY AUTOMATED COUNT: 15.3 % (ref 11.5–14.5)
EST. GFR  (AFRICAN AMERICAN): 45.1 ML/MIN/1.73 M^2
EST. GFR  (AFRICAN AMERICAN): 54.4 ML/MIN/1.73 M^2
EST. GFR  (NON AFRICAN AMERICAN): 39 ML/MIN/1.73 M^2
EST. GFR  (NON AFRICAN AMERICAN): 47 ML/MIN/1.73 M^2
FIBRINOGEN PPP-MCNC: 772 MG/DL (ref 182–400)
FLOW: 4
FLOW: 4
GLUCOSE SERPL-MCNC: 182 MG/DL (ref 70–110)
GLUCOSE SERPL-MCNC: 201 MG/DL (ref 70–110)
GLUCOSE SERPL-MCNC: 219 MG/DL (ref 70–110)
GLUCOSE SERPL-MCNC: 228 MG/DL (ref 70–110)
GLUCOSE SERPL-MCNC: 275 MG/DL (ref 70–110)
GLUCOSE SERPL-MCNC: 280 MG/DL (ref 70–110)
GLUCOSE SERPL-MCNC: 344 MG/DL (ref 70–110)
HCO3 UR-SCNC: 23.5 MMOL/L (ref 24–28)
HCO3 UR-SCNC: 24.5 MMOL/L (ref 24–28)
HCT VFR BLD AUTO: 25.2 % (ref 40–54)
HCT VFR BLD AUTO: 25.3 % (ref 40–54)
HCT VFR BLD AUTO: 25.5 % (ref 40–54)
HCT VFR BLD AUTO: 25.7 % (ref 40–54)
HCT VFR BLD CALC: 27 %PCV (ref 36–54)
HCT VFR BLD CALC: 30 %PCV (ref 36–54)
HGB BLD-MCNC: 8.4 G/DL (ref 14–18)
HGB BLD-MCNC: 8.5 G/DL (ref 14–18)
HYPOCHROMIA BLD QL SMEAR: ABNORMAL
HYPOCHROMIA BLD QL SMEAR: ABNORMAL
IMM GRANULOCYTES # BLD AUTO: 0.19 K/UL (ref 0–0.04)
IMM GRANULOCYTES # BLD AUTO: 0.19 K/UL (ref 0–0.04)
IMM GRANULOCYTES # BLD AUTO: 0.23 K/UL (ref 0–0.04)
IMM GRANULOCYTES # BLD AUTO: 0.25 K/UL (ref 0–0.04)
IMM GRANULOCYTES NFR BLD AUTO: 0.7 % (ref 0–0.5)
IMM GRANULOCYTES NFR BLD AUTO: 0.8 % (ref 0–0.5)
IMM GRANULOCYTES NFR BLD AUTO: 0.9 % (ref 0–0.5)
IMM GRANULOCYTES NFR BLD AUTO: 1 % (ref 0–0.5)
INR PPP: 1.1 (ref 0.8–1.2)
LDH SERPL L TO P-CCNC: 1.62 MMOL/L (ref 0.36–1.25)
LDH SERPL L TO P-CCNC: 2470 U/L (ref 110–260)
LDH SERPL L TO P-CCNC: 2838 U/L (ref 110–260)
LYMPHOCYTES # BLD AUTO: 1 K/UL (ref 1–4.8)
LYMPHOCYTES # BLD AUTO: 1.1 K/UL (ref 1–4.8)
LYMPHOCYTES # BLD AUTO: 1.1 K/UL (ref 1–4.8)
LYMPHOCYTES # BLD AUTO: 1.2 K/UL (ref 1–4.8)
LYMPHOCYTES NFR BLD: 3.7 % (ref 18–48)
LYMPHOCYTES NFR BLD: 4.1 % (ref 18–48)
LYMPHOCYTES NFR BLD: 4.4 % (ref 18–48)
LYMPHOCYTES NFR BLD: 4.7 % (ref 18–48)
MAGNESIUM SERPL-MCNC: 2.6 MG/DL (ref 1.6–2.6)
MAGNESIUM SERPL-MCNC: 2.8 MG/DL (ref 1.6–2.6)
MAGNESIUM SERPL-MCNC: 2.8 MG/DL (ref 1.6–2.6)
MAGNESIUM SERPL-MCNC: 2.9 MG/DL (ref 1.6–2.6)
MAGNESIUM SERPL-MCNC: 2.9 MG/DL (ref 1.6–2.6)
MCH RBC QN AUTO: 28.9 PG (ref 27–31)
MCH RBC QN AUTO: 29.2 PG (ref 27–31)
MCHC RBC AUTO-ENTMCNC: 32.9 G/DL (ref 32–36)
MCHC RBC AUTO-ENTMCNC: 33.1 G/DL (ref 32–36)
MCHC RBC AUTO-ENTMCNC: 33.6 G/DL (ref 32–36)
MCHC RBC AUTO-ENTMCNC: 33.7 G/DL (ref 32–36)
MCV RBC AUTO: 86 FL (ref 82–98)
MCV RBC AUTO: 86 FL (ref 82–98)
MCV RBC AUTO: 88 FL (ref 82–98)
MCV RBC AUTO: 88 FL (ref 82–98)
METHEMOGLOBIN: 0.7 % (ref 0–3)
MODE: ABNORMAL
MODE: ABNORMAL
MONOCYTES # BLD AUTO: 1.2 K/UL (ref 0.3–1)
MONOCYTES # BLD AUTO: 1.3 K/UL (ref 0.3–1)
MONOCYTES # BLD AUTO: 1.4 K/UL (ref 0.3–1)
MONOCYTES # BLD AUTO: 1.5 K/UL (ref 0.3–1)
MONOCYTES NFR BLD: 4.8 % (ref 4–15)
MONOCYTES NFR BLD: 5 % (ref 4–15)
MONOCYTES NFR BLD: 5.3 % (ref 4–15)
MONOCYTES NFR BLD: 5.7 % (ref 4–15)
NEUTROPHILS # BLD AUTO: 22.3 K/UL (ref 1.8–7.7)
NEUTROPHILS # BLD AUTO: 22.4 K/UL (ref 1.8–7.7)
NEUTROPHILS # BLD AUTO: 23.8 K/UL (ref 1.8–7.7)
NEUTROPHILS # BLD AUTO: 23.9 K/UL (ref 1.8–7.7)
NEUTROPHILS NFR BLD: 89.5 % (ref 38–73)
NEUTROPHILS NFR BLD: 89.5 % (ref 38–73)
NEUTROPHILS NFR BLD: 89.6 % (ref 38–73)
NEUTROPHILS NFR BLD: 89.8 % (ref 38–73)
NRBC BLD-RTO: 1 /100 WBC
NRBC BLD-RTO: 2 /100 WBC
NUM UNITS TRANS PACKED RBC: NORMAL
OVALOCYTES BLD QL SMEAR: ABNORMAL
OVALOCYTES BLD QL SMEAR: ABNORMAL
PCO2 BLDA: 35.6 MMHG (ref 35–45)
PCO2 BLDA: 42.7 MMHG (ref 35–45)
PH SMN: 7.37 [PH] (ref 7.35–7.45)
PH SMN: 7.43 [PH] (ref 7.35–7.45)
PHOSPHATE SERPL-MCNC: 2.5 MG/DL (ref 2.7–4.5)
PHOSPHATE SERPL-MCNC: 4 MG/DL (ref 2.7–4.5)
PHOSPHATE SERPL-MCNC: 4.8 MG/DL (ref 2.7–4.5)
PHOSPHATE SERPL-MCNC: 4.9 MG/DL (ref 2.7–4.5)
PHOSPHATE SERPL-MCNC: 5 MG/DL (ref 2.7–4.5)
PLATELET # BLD AUTO: 181 K/UL (ref 150–450)
PLATELET # BLD AUTO: 186 K/UL (ref 150–450)
PLATELET # BLD AUTO: 195 K/UL (ref 150–450)
PLATELET # BLD AUTO: 198 K/UL (ref 150–450)
PLATELET BLD QL SMEAR: ABNORMAL
PLATELET BLD QL SMEAR: ABNORMAL
PMV BLD AUTO: 10.2 FL (ref 9.2–12.9)
PMV BLD AUTO: 10.3 FL (ref 9.2–12.9)
PMV BLD AUTO: 10.3 FL (ref 9.2–12.9)
PMV BLD AUTO: 10.5 FL (ref 9.2–12.9)
PO2 BLDA: 332 MMHG (ref 80–100)
PO2 BLDA: 94 MMHG (ref 80–100)
POC BE: -1 MMOL/L
POC BE: -1 MMOL/L
POC IONIZED CALCIUM: 1.14 MMOL/L (ref 1.06–1.42)
POC IONIZED CALCIUM: 1.17 MMOL/L (ref 1.06–1.42)
POC SATURATED O2: 100 % (ref 95–100)
POC SATURATED O2: 98 % (ref 95–100)
POC TCO2: 25 MMOL/L (ref 23–27)
POC TCO2: 26 MMOL/L (ref 23–27)
POCT GLUCOSE: 179 MG/DL (ref 70–110)
POCT GLUCOSE: 220 MG/DL (ref 70–110)
POCT GLUCOSE: 235 MG/DL (ref 70–110)
POCT GLUCOSE: 251 MG/DL (ref 70–110)
POCT GLUCOSE: 307 MG/DL (ref 70–110)
POCT GLUCOSE: 373 MG/DL (ref 70–110)
POCT GLUCOSE: 378 MG/DL (ref 70–110)
POCT GLUCOSE: 397 MG/DL (ref 70–110)
POIKILOCYTOSIS BLD QL SMEAR: SLIGHT
POIKILOCYTOSIS BLD QL SMEAR: SLIGHT
POLYCHROMASIA BLD QL SMEAR: ABNORMAL
POLYCHROMASIA BLD QL SMEAR: ABNORMAL
POTASSIUM BLD-SCNC: 3.4 MMOL/L (ref 3.5–5.1)
POTASSIUM BLD-SCNC: 4.7 MMOL/L (ref 3.5–5.1)
POTASSIUM SERPL-SCNC: 3.5 MMOL/L (ref 3.5–5.1)
POTASSIUM SERPL-SCNC: 3.7 MMOL/L (ref 3.5–5.1)
POTASSIUM SERPL-SCNC: 3.9 MMOL/L (ref 3.5–5.1)
POTASSIUM SERPL-SCNC: 4 MMOL/L (ref 3.5–5.1)
PROT SERPL-MCNC: 6.9 G/DL (ref 6–8.4)
PROT SERPL-MCNC: 6.9 G/DL (ref 6–8.4)
PROT SERPL-MCNC: 7 G/DL (ref 6–8.4)
PROT SERPL-MCNC: 7.1 G/DL (ref 6–8.4)
PROT SERPL-MCNC: 7.1 G/DL (ref 6–8.4)
PROT SERPL-MCNC: 7.2 G/DL (ref 6–8.4)
PROTHROMBIN TIME: 11.1 SEC (ref 9–12.5)
PROTHROMBIN TIME: 11.3 SEC (ref 9–12.5)
PROTHROMBIN TIME: 11.4 SEC (ref 9–12.5)
PROTHROMBIN TIME: 11.4 SEC (ref 9–12.5)
RBC # BLD AUTO: 2.91 M/UL (ref 4.6–6.2)
RBC # BLD AUTO: 2.91 M/UL (ref 4.6–6.2)
RBC # BLD AUTO: 2.94 M/UL (ref 4.6–6.2)
RBC # BLD AUTO: 2.94 M/UL (ref 4.6–6.2)
SAMPLE: ABNORMAL
SITE: ABNORMAL
SITE: ABNORMAL
SODIUM BLD-SCNC: 134 MMOL/L (ref 136–145)
SODIUM BLD-SCNC: 137 MMOL/L (ref 136–145)
SODIUM SERPL-SCNC: 136 MMOL/L (ref 136–145)
SODIUM SERPL-SCNC: 136 MMOL/L (ref 136–145)
SODIUM SERPL-SCNC: 137 MMOL/L (ref 136–145)
SODIUM SERPL-SCNC: 138 MMOL/L (ref 136–145)
VANCOMYCIN SERPL-MCNC: 9.2 UG/ML
WBC # BLD AUTO: 24.96 K/UL (ref 3.9–12.7)
WBC # BLD AUTO: 24.99 K/UL (ref 3.9–12.7)
WBC # BLD AUTO: 26.58 K/UL (ref 3.9–12.7)
WBC # BLD AUTO: 26.68 K/UL (ref 3.9–12.7)

## 2022-07-02 PROCEDURE — 99233 SBSQ HOSP IP/OBS HIGH 50: CPT | Mod: ,,, | Performed by: INTERNAL MEDICINE

## 2022-07-02 PROCEDURE — 97535 SELF CARE MNGMENT TRAINING: CPT

## 2022-07-02 PROCEDURE — 25000003 PHARM REV CODE 250: Performed by: INTERNAL MEDICINE

## 2022-07-02 PROCEDURE — 63600175 PHARM REV CODE 636 W HCPCS: Performed by: STUDENT IN AN ORGANIZED HEALTH CARE EDUCATION/TRAINING PROGRAM

## 2022-07-02 PROCEDURE — 99900035 HC TECH TIME PER 15 MIN (STAT)

## 2022-07-02 PROCEDURE — 99291 PR CRITICAL CARE, E/M 30-74 MINUTES: ICD-10-PCS | Mod: ,,, | Performed by: ANESTHESIOLOGY

## 2022-07-02 PROCEDURE — 25000003 PHARM REV CODE 250: Performed by: STUDENT IN AN ORGANIZED HEALTH CARE EDUCATION/TRAINING PROGRAM

## 2022-07-02 PROCEDURE — 85610 PROTHROMBIN TIME: CPT | Mod: 91 | Performed by: STUDENT IN AN ORGANIZED HEALTH CARE EDUCATION/TRAINING PROGRAM

## 2022-07-02 PROCEDURE — 25000003 PHARM REV CODE 250: Performed by: NURSE PRACTITIONER

## 2022-07-02 PROCEDURE — 63600175 PHARM REV CODE 636 W HCPCS

## 2022-07-02 PROCEDURE — 63600175 PHARM REV CODE 636 W HCPCS: Performed by: INTERNAL MEDICINE

## 2022-07-02 PROCEDURE — 99223 1ST HOSP IP/OBS HIGH 75: CPT | Mod: ,,, | Performed by: INTERNAL MEDICINE

## 2022-07-02 PROCEDURE — 99291 CRITICAL CARE FIRST HOUR: CPT | Mod: ,,, | Performed by: ANESTHESIOLOGY

## 2022-07-02 PROCEDURE — 99233 PR SUBSEQUENT HOSPITAL CARE,LEVL III: ICD-10-PCS | Mod: ,,, | Performed by: INTERNAL MEDICINE

## 2022-07-02 PROCEDURE — 83735 ASSAY OF MAGNESIUM: CPT | Mod: 91 | Performed by: STUDENT IN AN ORGANIZED HEALTH CARE EDUCATION/TRAINING PROGRAM

## 2022-07-02 PROCEDURE — 80053 COMPREHEN METABOLIC PANEL: CPT | Mod: 91 | Performed by: THORACIC SURGERY (CARDIOTHORACIC VASCULAR SURGERY)

## 2022-07-02 PROCEDURE — 97116 GAIT TRAINING THERAPY: CPT

## 2022-07-02 PROCEDURE — 20000000 HC ICU ROOM

## 2022-07-02 PROCEDURE — 85384 FIBRINOGEN ACTIVITY: CPT | Performed by: STUDENT IN AN ORGANIZED HEALTH CARE EDUCATION/TRAINING PROGRAM

## 2022-07-02 PROCEDURE — 84100 ASSAY OF PHOSPHORUS: CPT | Performed by: STUDENT IN AN ORGANIZED HEALTH CARE EDUCATION/TRAINING PROGRAM

## 2022-07-02 PROCEDURE — 84100 ASSAY OF PHOSPHORUS: CPT | Mod: 91 | Performed by: STUDENT IN AN ORGANIZED HEALTH CARE EDUCATION/TRAINING PROGRAM

## 2022-07-02 PROCEDURE — 97165 OT EVAL LOW COMPLEX 30 MIN: CPT

## 2022-07-02 PROCEDURE — 80202 ASSAY OF VANCOMYCIN: CPT

## 2022-07-02 PROCEDURE — 27000221 HC OXYGEN, UP TO 24 HOURS

## 2022-07-02 PROCEDURE — 63600175 PHARM REV CODE 636 W HCPCS: Performed by: THORACIC SURGERY (CARDIOTHORACIC VASCULAR SURGERY)

## 2022-07-02 PROCEDURE — 85025 COMPLETE CBC W/AUTO DIFF WBC: CPT | Mod: 91 | Performed by: STUDENT IN AN ORGANIZED HEALTH CARE EDUCATION/TRAINING PROGRAM

## 2022-07-02 PROCEDURE — 87040 BLOOD CULTURE FOR BACTERIA: CPT | Mod: 59 | Performed by: THORACIC SURGERY (CARDIOTHORACIC VASCULAR SURGERY)

## 2022-07-02 PROCEDURE — 97164 PT RE-EVAL EST PLAN CARE: CPT

## 2022-07-02 PROCEDURE — 93750 PR INTERROGATE VENT ASSIST DEV, IN PERSON, W PHYSICIAN ANALYSIS: ICD-10-PCS | Mod: ,,, | Performed by: INTERNAL MEDICINE

## 2022-07-02 PROCEDURE — 85730 THROMBOPLASTIN TIME PARTIAL: CPT | Mod: 91 | Performed by: STUDENT IN AN ORGANIZED HEALTH CARE EDUCATION/TRAINING PROGRAM

## 2022-07-02 PROCEDURE — 63600367 HC NITRIC OXIDE PER HOUR

## 2022-07-02 PROCEDURE — 99232 PR SUBSEQUENT HOSPITAL CARE,LEVL II: ICD-10-PCS | Mod: ,,, | Performed by: NURSE PRACTITIONER

## 2022-07-02 PROCEDURE — 94761 N-INVAS EAR/PLS OXIMETRY MLT: CPT

## 2022-07-02 PROCEDURE — 25000003 PHARM REV CODE 250

## 2022-07-02 PROCEDURE — 82550 ASSAY OF CK (CPK): CPT

## 2022-07-02 PROCEDURE — 85730 THROMBOPLASTIN TIME PARTIAL: CPT | Performed by: STUDENT IN AN ORGANIZED HEALTH CARE EDUCATION/TRAINING PROGRAM

## 2022-07-02 PROCEDURE — 93750 INTERROGATION VAD IN PERSON: CPT | Mod: ,,, | Performed by: INTERNAL MEDICINE

## 2022-07-02 PROCEDURE — 85730 THROMBOPLASTIN TIME PARTIAL: CPT | Mod: 91 | Performed by: THORACIC SURGERY (CARDIOTHORACIC VASCULAR SURGERY)

## 2022-07-02 PROCEDURE — 99223 PR INITIAL HOSPITAL CARE,LEVL III: ICD-10-PCS | Mod: ,,, | Performed by: INTERNAL MEDICINE

## 2022-07-02 PROCEDURE — 25000003 PHARM REV CODE 250: Performed by: THORACIC SURGERY (CARDIOTHORACIC VASCULAR SURGERY)

## 2022-07-02 PROCEDURE — 97530 THERAPEUTIC ACTIVITIES: CPT

## 2022-07-02 PROCEDURE — 63600175 PHARM REV CODE 636 W HCPCS: Performed by: NURSE PRACTITIONER

## 2022-07-02 PROCEDURE — 27000248 HC VAD-ADDITIONAL DAY

## 2022-07-02 PROCEDURE — 83615 LACTATE (LD) (LDH) ENZYME: CPT | Performed by: THORACIC SURGERY (CARDIOTHORACIC VASCULAR SURGERY)

## 2022-07-02 PROCEDURE — 99232 SBSQ HOSP IP/OBS MODERATE 35: CPT | Mod: ,,, | Performed by: NURSE PRACTITIONER

## 2022-07-02 RX ORDER — FUROSEMIDE 10 MG/ML
20 INJECTION INTRAMUSCULAR; INTRAVENOUS ONCE
Status: COMPLETED | OUTPATIENT
Start: 2022-07-02 | End: 2022-07-02

## 2022-07-02 RX ORDER — OXYCODONE HYDROCHLORIDE 5 MG/1
5 TABLET ORAL EVERY 4 HOURS PRN
Status: DISCONTINUED | OUTPATIENT
Start: 2022-07-02 | End: 2022-07-05

## 2022-07-02 RX ADMIN — ACETYLCYSTEINE 6.25 MG/KG/HR: 200 INJECTION, SOLUTION INTRAVENOUS at 12:07

## 2022-07-02 RX ADMIN — INSULIN ASPART 4 UNITS: 100 INJECTION, SOLUTION INTRAVENOUS; SUBCUTANEOUS at 08:07

## 2022-07-02 RX ADMIN — INSULIN ASPART 4 UNITS: 100 INJECTION, SOLUTION INTRAVENOUS; SUBCUTANEOUS at 12:07

## 2022-07-02 RX ADMIN — MUPIROCIN: 20 OINTMENT TOPICAL at 09:07

## 2022-07-02 RX ADMIN — FUROSEMIDE 15 MG/HR: 10 INJECTION, SOLUTION INTRAMUSCULAR; INTRAVENOUS at 03:07

## 2022-07-02 RX ADMIN — POTASSIUM CHLORIDE 40 MEQ: 200 INJECTION, SOLUTION INTRAVENOUS at 05:07

## 2022-07-02 RX ADMIN — NICARDIPINE HYDROCHLORIDE 7.5 MG/HR: 0.2 INJECTION, SOLUTION INTRAVENOUS at 02:07

## 2022-07-02 RX ADMIN — INSULIN ASPART 6 UNITS: 100 INJECTION, SOLUTION INTRAVENOUS; SUBCUTANEOUS at 06:07

## 2022-07-02 RX ADMIN — INSULIN ASPART 2 UNITS: 100 INJECTION, SOLUTION INTRAVENOUS; SUBCUTANEOUS at 03:07

## 2022-07-02 RX ADMIN — NICARDIPINE HYDROCHLORIDE 7.5 MG/HR: 0.2 INJECTION, SOLUTION INTRAVENOUS at 07:07

## 2022-07-02 RX ADMIN — FAMOTIDINE 20 MG: 10 INJECTION, SOLUTION INTRAVENOUS at 08:07

## 2022-07-02 RX ADMIN — INSULIN HUMAN 4 UNITS/HR: 1 INJECTION, SOLUTION INTRAVENOUS at 06:07

## 2022-07-02 RX ADMIN — HEPARIN SODIUM 900 UNITS/HR: 5000 INJECTION INTRAVENOUS; SUBCUTANEOUS at 11:07

## 2022-07-02 RX ADMIN — FENTANYL CITRATE 25 MCG: 50 INJECTION INTRAMUSCULAR; INTRAVENOUS at 02:07

## 2022-07-02 RX ADMIN — FUROSEMIDE 20 MG: 10 INJECTION, SOLUTION INTRAMUSCULAR; INTRAVENOUS at 01:07

## 2022-07-02 RX ADMIN — WARFARIN SODIUM 2 MG: 2 TABLET ORAL at 05:07

## 2022-07-02 RX ADMIN — NICARDIPINE HYDROCHLORIDE 2.5 MG/HR: 0.2 INJECTION, SOLUTION INTRAVENOUS at 09:07

## 2022-07-02 RX ADMIN — ACETYLCYSTEINE 6.25 MG/KG/HR: 200 INJECTION, SOLUTION INTRAVENOUS at 09:07

## 2022-07-02 RX ADMIN — EPINEPHRINE 0.06 MCG/KG/MIN: 1 INJECTION INTRAMUSCULAR; INTRAVENOUS; SUBCUTANEOUS at 09:07

## 2022-07-02 RX ADMIN — OXYCODONE 5 MG: 5 TABLET ORAL at 03:07

## 2022-07-02 RX ADMIN — ACETYLCYSTEINE 6.25 MG/KG/HR: 200 INJECTION, SOLUTION INTRAVENOUS at 11:07

## 2022-07-02 RX ADMIN — DOBUTAMINE 2.5 MCG/KG/MIN: 12.5 INJECTION, SOLUTION, CONCENTRATE INTRAVENOUS at 05:07

## 2022-07-02 RX ADMIN — FENTANYL CITRATE 25 MCG: 50 INJECTION INTRAMUSCULAR; INTRAVENOUS at 09:07

## 2022-07-02 RX ADMIN — OXYCODONE 5 MG: 5 TABLET ORAL at 07:07

## 2022-07-02 RX ADMIN — FAMOTIDINE 20 MG: 10 INJECTION, SOLUTION INTRAVENOUS at 09:07

## 2022-07-02 RX ADMIN — MUPIROCIN: 20 OINTMENT TOPICAL at 08:07

## 2022-07-02 RX ADMIN — POLYETHYLENE GLYCOL 3350 17 G: 17 POWDER, FOR SOLUTION ORAL at 09:07

## 2022-07-02 RX ADMIN — NICARDIPINE HYDROCHLORIDE 2.5 MG/HR: 0.2 INJECTION, SOLUTION INTRAVENOUS at 11:07

## 2022-07-02 RX ADMIN — POTASSIUM CHLORIDE 40 MEQ: 200 INJECTION, SOLUTION INTRAVENOUS at 03:07

## 2022-07-02 RX ADMIN — POTASSIUM CHLORIDE 40 MEQ: 200 INJECTION, SOLUTION INTRAVENOUS at 09:07

## 2022-07-02 RX ADMIN — INSULIN ASPART 10 UNITS: 100 INJECTION, SOLUTION INTRAVENOUS; SUBCUTANEOUS at 09:07

## 2022-07-02 RX ADMIN — POTASSIUM CHLORIDE 40 MEQ: 200 INJECTION, SOLUTION INTRAVENOUS at 01:07

## 2022-07-02 RX ADMIN — DAPTOMYCIN 750 MG: 350 INJECTION, POWDER, LYOPHILIZED, FOR SOLUTION INTRAVENOUS at 01:07

## 2022-07-02 RX ADMIN — FENTANYL CITRATE 25 MCG: 50 INJECTION INTRAMUSCULAR; INTRAVENOUS at 06:07

## 2022-07-02 RX ADMIN — POTASSIUM CHLORIDE 40 MEQ: 200 INJECTION, SOLUTION INTRAVENOUS at 08:07

## 2022-07-02 RX ADMIN — FENTANYL CITRATE 25 MCG: 50 INJECTION INTRAMUSCULAR; INTRAVENOUS at 01:07

## 2022-07-02 RX ADMIN — FENTANYL CITRATE 25 MCG: 50 INJECTION INTRAMUSCULAR; INTRAVENOUS at 05:07

## 2022-07-02 NOTE — PLAN OF CARE
POC reviewed with pt and family, more awake this AM, however expressive aphasia noted, somewhat improved throughout the night. Pt mendez NPO ex for ice chips for liver US, repeated education required about fluid restrictions. PRN Fentanyl 25 mcg given for pain with some relief. LVAD speed remains 5200, flow 3.9-4.1, PI 4.9, power 3.7. RVAD removed at 1900, suture to R IJ intact, no swelling or hematoma noted. Net negative 900 mL for this shift, CVP 12 this AM. 4L O2 with NO @ 20 PPM, RR 30-44. T max 102.2, fans applied, room cool. Dopplerable pulses, L DP remains weak, toes cooler than R foot. All AM lab results reviewed, discussed with MD.

## 2022-07-02 NOTE — CONSULTS
[facility]  Hepatology  Consult Note    Patient Name: Kevan Queen  MRN: [id number]  Admission Date: 6/13/2022  Hospital Length of Stay: 19 days  Attending Provider: Luis F Paige MD   Primary Care Physician: ORALIA Cline  Principal Problem:Acute on chronic combined systolic and diastolic congestive heart failure, NYHA class 4    Inpatient consult to Hepatology  Consult performed by: Marcos Bunch MD  Consult ordered by: Rebeca Horvath MD        Subjective:     Transplant status: No    HPI:  Mr. Kevan Queen is a 37-year-old male for whom hepatology is consulted with concern for acute liver failure. He has a PMH significant for non-ischemic cardiomyopathy with combined CHF (on home Milrinone and status post ICD placement), CKD III, and tobacco abuse.     Hospital Course: Patient was admitted to [facility] on 06/13/2022 for management of CHF exacerbation. He was admitted to [facility] and initiated on IV Lasix with continuation of home Milrinone. IABP placed on admission due to concern for cardiogenic shock. Hospital course associated with onset of S.epidermidis bacteremia on 06/20; ID consulted and patient initiated on Vancomycin with blood cultures remaining NGTD as of 06/25. He then underwent LVAD placement on 06/29. On post-op day #2, patient was noted to develop new transaminitis (AST 1469, ) with normal AlkP and bilirubin associated with onset of fever (Tmax 103). Sepsis work-up repeated and antibiotics broadened to Cefepime and Daptomycin. Hepatology consulted on 07/02 for comment on liver enzyme abnormality.     On initial bedside interview, patient reported fatigue and generalized chest and abdominal soreness since LVAD placement. He denies prior history of liver disease, family history of liver disease, or preceding ETOH usage before this hospitalization.       Review of Systems   Constitutional:  Positive for fatigue. Negative for appetite change, chills and fever.    HENT:  Negative for congestion, sore throat and trouble swallowing.    Eyes:  Negative for photophobia, pain and discharge.   Respiratory:  Positive for chest tightness. Negative for cough and shortness of breath.    Cardiovascular:  Negative for chest pain and palpitations.   Gastrointestinal:  Positive for abdominal pain. Negative for diarrhea, nausea and vomiting.   Genitourinary:  Negative for difficulty urinating.   Musculoskeletal:  Negative for back pain, myalgias and neck pain.   Skin:  Negative for pallor and rash.   Neurological:  Negative for light-headedness, numbness and headaches.     Past Medical History:   Diagnosis Date    Arthritis     Cardiomyopathy     CHF (congestive heart failure) 10/01/2020    Diabetes mellitus     Dilated cardiomyopathy 10/26/2020    Drug abuse 10/2020    Hyperosmolar hyperglycemic state (HHS) 5/25/2022    ICD (implantable cardioverter-defibrillator) in place 10/26/2020    Muscle cramping 6/15/2022    Renal disorder        Past Surgical History:   Procedure Laterality Date    APPLICATION OF WOUND VACUUM-ASSISTED CLOSURE DEVICE N/A 6/30/2022    Procedure: APPLICATION, WOUND VAC;  Surgeon: Luis F Paige MD;  Location: [facility] OR [address] [place] [address];  Service: Cardiovascular;  Laterality: N/A;  50 x 5 cm    CARDIAC DEFIBRILLATOR PLACEMENT      IMPLANTATION OF RIGHT VENTRICULAR ASSIST DEVICE (RVAD) N/A 6/29/2022    Procedure: INSERTION, RVAD;  Surgeon: Luis F Paige MD;  Location: [facility] OR [address] [place] [address];  Service: Cardiovascular;  Laterality: N/A;    INSERTION OF GRAFT TO PERICARDIUM Right 6/30/2022    Procedure: INSERTION-RIGHT VENTRICULAR ASSIST DEVICE;  Surgeon: Luis F Paige MD;  Location: [facility] OR [address] [place] [address];  Service: Cardiovascular;  Laterality: Right;    IRRIGATION OF MEDIASTINUM  6/30/2022    Procedure: IRRIGATION, MEDIASTINUM;  Surgeon: Luis F Paige MD;  Location: [facility] OR [address] [place] [address];  Service: Cardiovascular;;    LEFT VENTRICULAR ASSIST DEVICE Left 6/23/2022    Procedure:  INSERTION-LEFT VENTRICULAR ASSIST DEVICE;  Surgeon: Luis F Paige MD;  [facility];  Service: Cardiovascular;  Laterality: Left;    LEFT VENTRICULAR ASSIST DEVICE N/A 2022    Procedure: INSERTION-LEFT VENTRICULAR ASSIST DEVICE;  Surgeon: Luis F Paige MD;  Location: [facility] OR [facility];  Service: Cardiovascular;  Laterality: N/A;    RIGHT HEART CATHETERIZATION Right 2022    Procedure: INSERTION, CATHETER, RIGHT HEART;  Surgeon: Luca Lopez Jr., MD;  Location: [facility] CATH LAB;  Service: Cardiology;  Laterality: Right;    RIGHT HEART CATHETERIZATION Right 2022    Procedure: INSERTION, CATHETER, RIGHT HEART;  Surgeon: Josh Pulido MD;  Location: [facility] CATH LAB;  Service: Cardiology;  Laterality: Right;    STERNAL WOUND CLOSURE N/A 2022    Procedure: CLOSURE, WOUND, STERNUM;  Surgeon: Luis F Paige MD;  [facility];  Service: Cardiovascular;  Laterality: N/A;       Family history of liver disease: No    Review of patient's allergies indicates:   Allergen Reactions    Aspirin Other (See Comments)     Mr. Thacker is enrolled in Dr. Paige's Alon Trial and cannot have any aspirin and/or aspirin-containing products. DO NOT cancel any orders for INV Aspirin 100 mg/Placebo. If you have questions, please contact Isabel @ [phone], [phone],[email or web address], secure chat or MS Teams message.    Bumex [bumetanide] Hives    Lactose Diarrhea     Other reaction(s): Abdominal distension, gaseous    Torsemide Hives         Tobacco Use    Smoking status: Former Smoker     Packs/day: 0.50     Years: 16.00     Pack years: 8.00     Start date: 10/1/2004     Quit date: 2021     Years since quittin.1    Smokeless tobacco: Never Used   Substance and Sexual Activity    Alcohol use: Not Currently    Drug use: Not Currently     Types: Marijuana, MDMA (Ecstacy)    Sexual activity: Yes     Partners: Female     Birth control/protection: None  "      Medications Prior to Admission   Medication Sig Dispense Refill Last Dose    busPIRone (BUSPAR) 7.5 MG tablet Take 7.5 mg by mouth every 12 (twelve) hours.   6/12/2022 at Unknown time    EScitalopram oxalate (LEXAPRO) 5 MG Tab Take 1 tablet (5 mg total) by mouth once daily. 90 tablet 3 6/13/2022 at Unknown time    furosemide (LASIX) 80 MG tablet Take 80 mg by mouth 2 (two) times daily.   6/12/2022 at Unknown time    insulin aspart U-100 (NOVOLOG) 100 unit/mL (3 mL) InPn pen Inject 12 Units into the skin 3 (three) times daily. 30 mL 3 6/12/2022 at Unknown time    insulin detemir U-100 (LEVEMIR FLEXTOUCH) 100 unit/mL (3 mL) SubQ InPn pen Inject 23 Units into the skin once daily. 18 mL 3 6/12/2022 at Unknown time    mirtazapine (REMERON) 15 MG tablet Take 15 mg by mouth nightly.   6/12/2022 at Unknown time    pantoprazole (PROTONIX) 40 MG tablet Take 1 tablet (40 mg total) by mouth once daily. 30 tablet 11 6/12/2022 at Unknown time    potassium chloride SA (K-DUR,KLOR-CON) 20 MEQ tablet Take 2 tablets (40 mEq total) by mouth once daily. 60 tablet 11 6/12/2022 at Unknown time    sucralfate (CARAFATE) 1 gram tablet Take 1 g by mouth nightly.   6/12/2022 at Unknown time    albuterol (PROVENTIL/VENTOLIN HFA) 90 mcg/actuation inhaler INHALE 2 PUFFS BY MOUTH EVERY 4 HOURS AS NEEDED FOR COUGH OR WHEEZING       blood sugar diagnostic Strp Use to test blood glucose 4 (four) times daily. 200 each 3     blood-glucose meter Misc Use as instructed 1 each 0     lancets 33 gauge Misc Use to test blood glucose 4 (four) times daily. 200 each 3     milrinone (PRIMACOR) 1 mg/mL injection Inject into the vein.       milrinone 20mg/100ml D5W, 200mcg/ml, (PRIMACOR) 20 mg/100 mL (200 mcg/mL) infusion Inject 34.875 mcg/min into the vein continuous.       pen needle, diabetic 31 gauge x 1/4" Ndle 1 Device by Misc.(Non-Drug; Combo Route) route 4 (four) times daily. 180 each 3     promethazine (PHENERGAN) 12.5 MG Tab Take " 1 tablet (12.5 mg total) by mouth every 6 (six) hours as needed (nausea). 30 tablet 3     traMADoL (ULTRAM) 50 mg tablet Take 1 tablet (50 mg total) by mouth every 6 (six) hours as needed for Pain. 28 tablet 0        Objective:     Vital Signs (Most Recent):  Temp: (!) 100.58 °F (38.1 °C) (07/02/22 1200)  Pulse: (!) 117 (07/02/22 1200)  Resp: (!) 47 (07/02/22 1200)  BP: (!) 92/0 (07/02/22 0800)  SpO2: 96 % (07/02/22 1200)   Vital Signs (24h Range):  Temp:  [100.22 °F (37.9 °C)-102.2 °F (39 °C)] 100.58 °F (38.1 °C)  Pulse:  [109-121] 117  Resp:  [15-58] 47  SpO2:  [92 %-100 %] 96 %  BP: (92)/(0) 92/0  Arterial Line BP: ()/(64-85) 90/76     Weight: 93.7 kg (206 lb 9.1 oz) (06/29/22 0200)  Body mass index is 25.82 kg/m².    Physical Exam  Constitutional:       Appearance: Normal appearance.   Eyes:      General: No scleral icterus.     Conjunctiva/sclera: Conjunctivae normal.   Cardiovascular:      Rate and Rhythm: Regular rhythm. Tachycardia present.      Pulses: Normal pulses.      Heart sounds: Murmur heard.   Pulmonary:      Effort: Pulmonary effort is normal. No respiratory distress.      Breath sounds: Normal breath sounds.   Chest:      Comments: Dressing in place over sternum.   Abdominal:      General: Bowel sounds are normal. There is distension.      Palpations: Abdomen is soft.      Tenderness: There is no abdominal tenderness.      Comments: Large dressing placed over entire abdominal wall.    Skin:     General: Skin is warm and dry.      Findings: No bruising or rash.   Neurological:      Mental Status: He is alert and oriented to person, place, and time.      Comments: No asterixis.        MELD-Na score: 18 at 7/2/2022  8:05 AM  MELD score: 18 at 7/2/2022  8:05 AM  Calculated from:  Serum Creatinine: 2.1 mg/dL at 7/2/2022  8:05 AM  Serum Sodium: 137 mmol/L at 7/2/2022  8:05 AM  Total Bilirubin: 2.3 mg/dL at 7/2/2022  8:05 AM  INR(ratio): 1.1 at 7/2/2022  8:05 AM  Age: 37 years    Significant  Labs:  Labs within the past month have been reviewed.    Significant Imaging:  X-Ray: Reviewed  US: Reviewed    Assessment/Plan:     Transaminitis  This is a 37-year-old male with a PMH significant for non-ischemic cardiomyopathy with combined CHF (on home Milrinone and status post ICD placement), CKD III, and tobacco abuse who was admitted to [facility] on 06/13/2022 for management of cardiogenic shock requiring IABP placement. Hospital course associated with onset of sepsis secondary to S.epidermidis bacteremia on 06/20. He is now status post LVAD placement on 06/29 with post-op course notable for onset of sepsis and transaminitis in hepatocellular pattern. He currently does not meet criteria for acute liver failure given absence of encephalopathy and INR <1.4. Etiology of liver enzyme abnormality is likely secondary to low-flow state/ischemia in the setting of severe cardiomyopathy and critical illness.     Recommendations:     -Obtain serological work-up for infectious, auto-immune, or hereditary conditions that may be contributing (ordered).   -Minimize use of medications that may precipitate encephalopathy (e.g. opiates and benzo's).   -Continued work-up and management of sepsis.   -Maintain normal hemodynamics.   -Obtain CMP and INR daily.         Thank you for your consult. I will follow-up with patient. Please contact us if you have any additional questions.    Marcos Bunch MD  Hepatology  [facility]

## 2022-07-02 NOTE — PT/OT/SLP RE-EVAL
Physical Therapy Re-Assessment    Patient Name:  Kevan Queen   MRN:  [id number]  Admit Date: 6/13/2022  Admitting Diagnosis:  Acute on chronic combined systolic and diastolic congestive heart failure, NYHA class 4   Length of Stay: 19 days  Recent Surgery: Procedure(s) (LRB):  CLOSURE, WOUND, STERNUM (N/A)  INSERTION-RIGHT VENTRICULAR ASSIST DEVICE (Right)  APPLICATION, WOUND VAC (N/A)  IRRIGATION, MEDIASTINUM 2 Days Post-Op    Hospital Course:  6/15/2022: PT initial evaluation    Please refer to PT initial evaluation for PLOF and social history.  Recommendations:     Discharge Recommendations:  rehabilitation facility   Discharge Equipment Recommendations:  (TBD)   Barriers to discharge: None    Assessment:     Kevan Queen is a 37 y.o. male admitted with a medical diagnosis of Acute on chronic combined systolic and diastolic congestive heart failure, NYHA class 4. Patient found alert and cooperative with therapy.  Vitals remained stable. Patient able to take 3 side steps to head of bed with total assistance of 2 persons.   Patient has room to improve level of assistance with all functional mobility to return to OF. Progress next session to initiate forward steps to chair. Patient is appropriate for inpatient rehabilitation upon hospital discharge.       Problem List: weakness, impaired endurance, impaired self care skills, impaired functional mobilty, gait instability, impaired balance, decreased upper extremity function, pain, impaired cardiopulmonary response to activity  Rehab Prognosis: Good   Plan:     During this hospitalization, patient to be seen 6 x/week to address the above listed problems via gait training, therapeutic exercises, therapeutic activities, neuromuscular re-education  · Plan of Care Expires:  08/01/22   Plan of Care Reviewed with: spouse, patient    Subjective   Communicated with RN prior to session.  Patient found HOB elevated upon PT entry to room, agreeable to evaluation. Kevan  "Bernice's wife present during session.    Chief Complaint: "pain everywhere"   Patient/Family Comments/goals: to return to PLOF of independence   Pain/Comfort:  · Pain Rating 1: 10/10  · Location 1:  (everywhere per patient report)  · Pain Addressed 1: Reposition  · Pain Rating Post-Intervention 1: 10/10  · Pain Rating 2: other (see comments) (pain increased over 10/10 with movement)    Objective:   Patient found with: central line, peripheral IV, chest tube, melendez catheter, arterial line, LVAD, blood pressure cuff, telemetry, pulse ox (continuous), Nitric Oxide   General Precautions: Standard, fall, LVAD   Orthopedic Precautions:N/A   Braces: N/A   Body mass index is 25.82 kg/m².  Oxygen Device: Nasal Cannula and Nitric Oxide  Vitals: BP (!) 92/0 (BP Location: Right arm, Patient Position: Lying)   Pulse (!) 111   Temp 100.22 °F (37.9 °C)   Resp (!) 44   Ht 6' 3" (1.905 m)   Wt 93.7 kg (206 lb 9.1 oz)   SpO2 96%   BMI 25.82 kg/m²     Exams:  · Cognition:   · Alert and Cooperative  · AxOx 4  · Command following: Follows multistep  commands  · Fluency: clear/fluent  · Hearing: Intact    · RLE ROM: WFL  · RLE Strength: grossly 4/5  · LLE ROM: WFL  · LLE Strength: grossly 4/5     Outcome Measures:  AM-PAC 6 CLICK MOBILITY  Turning over in bed (including adjusting bedclothes, sheets and blankets)?: 2  Sitting down on and standing up from a chair with arms (e.g., wheelchair, bedside commode, etc.): 2  Moving from lying on back to sitting on the side of the bed?: 2  Moving to and from a bed to a chair (including a wheelchair)?: 2  Need to walk in hospital room?: 1  Climbing 3-5 steps with a railing?: 1  Basic Mobility Total Score: 10     Functional Mobility:  Additional staff present: OT and Supervising PT  Bed Mobility:   · Rolling/Turning to Left: maximal assistance and 2 persons  · Supine to Sit: maximal assistance and 2 persons   · Sit to supine: Total of 2   · Scooting anteriorly to EOB to have both feet planted " on floor: total assistance    Sitting Balance at Edge of Bed:   Assistance Level Required: fluctuating between Stand-by Assistance and Contact Guard Assistance   Time: 15 min    Postural deviations noted: forward head   Encouraged: verbal cues given to look up at therapist; patient corrected with verbal cues    Comments:   o Performed UE and ADL's with OT   o Performed LE testing with PT     Transfers:   · Sit <> Stand Transfer: maximal assistance and of 2 persons with no assistive device   · Patient unable to reach full hip extension with facilitation from PT an OT       Gait:  · Patient ambulated: 3 side steps to HOB    · Patient required: total assist and of 2 persons  · Patient used:  No Assistive Device  · Gait Deviation(s): unsteady gait, decreased step length, decreased weight shift, flexed posture, decreased william and hips still in flexion   · Impairments due to: pain, decreased strength, decreased endurance and and post surgical instability   · Comments:   · Max verbal cues given to step to HOB   · Patient shuffled bilateral LE to side due to decreased weight shift; weight shift facilitated bilaterally         Therapeutic Activities, Exercises, Education:   Patient educated on PT role and POC.  Patient educated on importance of daily mobility.   Patient educated on sternal precautions.   Patient verbalized understanding.         Patient left HOB elevated with all lines intact, call button in reach and RN notified..    GOALS:   Multidisciplinary Problems     Physical Therapy Goals        Problem: Physical Therapy    Goal Priority Disciplines Outcome Goal Variances Interventions   Physical Therapy Goal     PT, PT/OT Ongoing, Progressing     Description: Goals to be met by: 2022    Patient will increase functional independence with mobility by performin. Supine to sit with MInimal Assistance  3. Sit to stand transfer with Minimal Assistance  4. Gait  x 50 feet with Contact Guard Assistance  using No Assistive Device.                  Multidisciplinary Problems (Resolved)        Problem: Physical Therapy    Goal Priority Disciplines Outcome Goal Variances Interventions   Physical Therapy Goal   (Resolved)     PT, PT/OT Met     Description:     Problem: Physical Therapy  Goal: Physical Therapy Goal  Description: Goals to be met by: 2022     Patient will increase functional independence with mobility by performin. Lower extremity exercise program without IABP 30 reps per handout, with independence  2.Upper extremity exercise program 30 reps per handout, with independence                        Time Tracking:     PT Received On: 22  PT Start Time: 803     PT Stop Time: 08  PT Total Time (min): 43 min     Billable Minutes: Evaluation 10, Gait Training 10 and Therapeutic Activity 13

## 2022-07-02 NOTE — ASSESSMENT & PLAN NOTE
This is a 37-year-old male with a PMH significant for non-ischemic cardiomyopathy with combined CHF (on home Milrinone and status post ICD placement), CKD III, and tobacco abuse who was admitted to [facility] on 06/13/2022 for management of cardiogenic shock requiring IABP placement. Hospital course associated with onset of sepsis secondary to S.epidermidis bacteremia on 06/20. He is now status post LVAD placement on 06/29 with post-op course notable for onset of sepsis and transaminitis in hepatocellular pattern. He currently does not meet criteria for acute liver failure given absence of encephalopathy and INR <1.4. Etiology of liver enzyme abnormality is likely secondary to low-flow state/ischemia in the setting of severe cardiomyopathy and critical illness.     Recommendations:     -Obtain serological work-up for infectious, auto-immune, or hereditary conditions that may be contributing (ordered).   -Minimize use of medications that may precipitate encephalopathy (e.g. opiates and benzo's).   -Continued work-up and management of sepsis.   -Maintain normal hemodynamics.   -Obtain CMP and INR daily.

## 2022-07-02 NOTE — ASSESSMENT & PLAN NOTE
- Admit sCr 1.8, baseline ~ 1.5-1.7  - Creatinine currently 2.1, stable  - Lasix 7.5 mg/hr - robust UOP response after given diuril last night, making ~200 cc/hr

## 2022-07-02 NOTE — ASSESSMENT & PLAN NOTE
-S/P DT HM3 with MVR plus Protek Duo for RV support 6/29 (Grecia)  -CTS primary  -LVAD speed 5000 with flows 3.7-3.8, RVAD speed 5100 with flows 3.2-3.4 - Grecia aware  - Epi 0.06. On  2.5 and Marcella 5 ppm.  - on 7/2, rvad removed with concern for hemolysis, Marcella increased to 20, LVAD speed increased to 5200   Cardene prn to keep MAP 75-80  -Post op antibiotic per CTS (h/o Staph epi bacteremia, cleareed by ID for surgery with rec to continue antibiotic coverage for 2 weeks post op)  -AC per CTS      Procedure: Device Interrogation Including analysis of device parameters  Current Settings: Ventricular Assist Device  Review of device function is stable    TXP LVAD INTERROGATIONS 7/2/2022 7/2/2022 7/2/2022 7/2/2022 7/2/2022 7/2/2022 7/2/2022   Type HeartMate3 HeartMate3 HeartMate3 HeartMate3 HeartMate3 HeartMate3 HeartMate3   Flow 4.3 4.4 4.4 4.2 3.9 4.1 3.8   Speed 5200 5200 5200 5200 5200 5200 5200   PI 3.6 3.2 3.2 4.1 5.9 5 5.4   Power (Serna) 3.7 3.7 3.6 3.8 3.7 3.7 3.7   LSL 4800 4800 4800 4800 4800 4800 4800   Pulsatility - - - - - - -

## 2022-07-02 NOTE — PLAN OF CARE
Problem: Physical Therapy  Goal: Physical Therapy Goal  Description: Goals to be met by: 2022    Patient will increase functional independence with mobility by performin. Supine to sit with MInimal Assistance  3. Sit to stand transfer with Minimal Assistance  4. Gait  x 50 feet with Contact Guard Assistance using No Assistive Device.       Outcome: Ongoing, Progressing     Eval Completed. Goals Appropriate

## 2022-07-02 NOTE — PROGRESS NOTES
Dr. Horvath notified of AST @ 0000 1700, Mucomyst had not been started at the time labs were sent, will recheck @ 0300.

## 2022-07-02 NOTE — ASSESSMENT & PLAN NOTE
- NIDCM  - Was not evaluated for OHTx as he quit smoking in April 2022  - Received HM3 on 6/29  - See LVAD    Unlikely OHTx candidate with smoking history (quit April 2022)

## 2022-07-02 NOTE — PROGRESS NOTES
Dr. Paige updated on all VS, UOP and gtts, Vasopressin decreased to 0.02Units/min, STAT labs sent, 25 mEq of NaBicarb IVP to be given really slow, 500 mg Diuril and 500 mL Albumin to be given at 50 mL/hr.

## 2022-07-02 NOTE — PLAN OF CARE
Problem: Occupational Therapy  Goal: Occupational Therapy Goal  Description: Goals to be met by: 8/2/22     Patient will increase functional independence with ADLs by performing:    UE Dressing with Modified [place].  LE Dressing with Modified [place] and Assistive Devices as needed.  Grooming while standing at sink with Modified [place].  Toileting from toilet with Modified [place] for hygiene and clothing management.   Bathing from  shower chair/bench with Modified [place].  Toilet transfer to toilet with Modified [place].  Increased functional strength to WFL for B UE.  Upper extremity exercise program x15 reps per handout, with assistance as needed.  Pt will be I in all LVAD management.    Outcome: Ongoing, Progressing

## 2022-07-02 NOTE — PROGRESS NOTES
[facility] - Surgical Intensive Care  Critical Care - Surgery  Progress Note    Patient Name: Kevan Queen  MRN: [id number]  Admission Date: 6/13/2022  Hospital Length of Stay: 19 days  Code Status: Prior  Attending Provider: Luis F Paige MD  Primary Care Provider: ORALIA Cline   Principal Problem: Acute on chronic combined systolic and diastolic congestive heart failure, NYHA class 4    Subjective:     Hospital/ICU Course:  No notes on file    Interval History/Significant Events: RVAD clot overnight with subsequent removal at bedside. Increasing LFTs, LDH and CPK. Diurel and albumin given with increase in UOP. Successfully extubated yesterday.    Follow-up For: Procedure(s) (LRB):  CLOSURE, WOUND, STERNUM (N/A)  INSERTION-RIGHT VENTRICULAR ASSIST DEVICE (Right)  APPLICATION, WOUND VAC (N/A)  IRRIGATION, MEDIASTINUM    Post-Operative Day: 2 Days Post-Op    Objective:     Vital Signs (Most Recent):  Temp: (!) 100.58 °F (38.1 °C) (07/02/22 1130)  Pulse: (!) 113 (07/02/22 1130)  Resp: (!) 56 (07/02/22 1130)  BP: (!) 92/0 (07/02/22 0800)  SpO2: 97 % (07/02/22 1130)   Vital Signs (24h Range):  Temp:  [100.04 °F (37.8 °C)-102.2 °F (39 °C)] 100.58 °F (38.1 °C)  Pulse:  [109-121] 113  Resp:  [15-58] 56  SpO2:  [92 %-100 %] 97 %  BP: (92)/(0) 92/0  Arterial Line BP: ()/(64-85) 89/75     Weight: 93.7 kg (206 lb 9.1 oz)  Body mass index is 25.82 kg/m².      Intake/Output Summary (Last 24 hours) at 7/2/2022 1154  Last data filed at 7/2/2022 1119  Gross per 24 hour   Intake 3312.46 ml   Output 4880 ml   Net -1567.54 ml       Physical Exam  Vitals reviewed.   Constitutional:       General: He is not in acute distress.  HENT:      Head: Normocephalic and atraumatic.      Nose: Nose normal.      Mouth/Throat:      Mouth: Mucous membranes are moist.   Eyes:      Pupils: Pupils are equal, round, and reactive to light.   Neck:      Comments: RVAD cannulation to [facility]    Cardiovascular:      Rate and Rhythm: Regular rhythm.  Tachycardia present.      Pulses: Normal pulses.      Comments: S/p chest closure. Incision c/d/I.  Chest tubes in place with minimal sanguinous output      Pulmonary:      Effort: Pulmonary effort is normal. No respiratory distress.      Comments: Intubated, mechanically ventilated  Abdominal:      General: Abdomen is flat. There is no distension.      Palpations: Abdomen is soft.   Genitourinary:     Comments: melendez  Musculoskeletal:      Comments: IABP site at left fem with dressing in place   Skin:     General: Skin is warm and dry.      Capillary Refill: Capillary refill takes less than 2 seconds.   Neurological:      General: No focal deficit present.      Mental Status: He is alert and oriented to person, place, and time.       Vents:  Vent Mode: Spont (07/01/22 1205)  Ventilator Initiated: Yes (06/29/22 1527)  Set Rate: 18 BPM (07/01/22 0737)  Vt Set: 580 mL (07/01/22 0737)  Pressure Support: 8 cmH20 (07/01/22 1205)  PEEP/CPAP: 5 cmH20 (07/01/22 1205)  Oxygen Concentration (%): 50 (07/01/22 1205)  Peak Airway Pressure: 15 cmH2O (07/01/22 1205)  Plateau Pressure: 21 cmH20 (07/01/22 1205)  Total Ve: 17 mL (07/01/22 1205)  Negative Inspiratory Force (cm H2O): -13 (07/01/22 1205)  F/VT Ratio<105 (RSBI): (!) 70.48 (07/01/22 1205)    Lines/Drains/Airways       Central Venous Catheter Line  Duration              Introducer with Double Lumen 06/29/22 1123 3 days    Percutaneous Central Line Insertion/Assessment - Triple Lumen  06/29/22 1200 left internal jugular 2 days              Drain  Duration                  Urethral Catheter 06/29/22 0840 Straight-tip;Temperature probe;Non-latex 14 Fr. 3 days         Chest Tube 06/29/22 1425 1 Left Pleural 32 Fr. 2 days         Chest Tube 06/29/22 1425 2 Right Pleural 32 Fr. 2 days              Arterial Line  Duration             Arterial Line 06/29/22 0832 Left Brachial 3 days              Line  Duration                  VAD 06/29/22 1115 Left ventricular assist device  HeartMate 3 3 days              Peripheral Intravenous Line  Duration                  Peripheral IV - Single Lumen 06/29/22 1330 18 G Right Antecubital 2 days         Peripheral IV - Single Lumen 06/29/22 2200 20 G Left;Posterior Hand 2 days                    Significant Labs:    CBC/Anemia Profile:  Recent Labs   Lab 07/02/22  0000 07/02/22  0306 07/02/22  0309 07/02/22  0805   WBC 26.68*  --  26.58* 24.99*   HGB 8.5*  --  8.5* 8.4*   HCT 25.3* 27* 25.7* 25.5*     --  186 195   MCV 86  --  88 88   RDW 15.1*  --  15.2* 15.3*        Chemistries:  Recent Labs   Lab 07/02/22  0000 07/02/22  0309 07/02/22  0805    137 137   K 4.0 3.7 3.5    101 99   CO2 21* 21* 23   BUN 63* 64* 67*   CREATININE 2.1* 2.1* 2.1*   CALCIUM 9.1 9.2 9.0   ALBUMIN 3.5 3.5 3.6   PROT 6.9 6.9 7.1   BILITOT 2.5* 2.4* 2.3*   ALKPHOS 90 87 88   * 853* 948*   AST 1,700* 1,715* 1,507*   MG 2.8* 2.8* 2.9*   PHOS 5.0* 4.9* 4.8*       ABGs:   Recent Labs   Lab 07/02/22  0306   PH 7.428   PCO2 35.6   HCO3 23.5*   POCSATURATED 98   BE -1     Lactic Acid:   Recent Labs   Lab 07/01/22  1807   LACTATE 2.2     All pertinent labs within the past 24 hours have been reviewed.    Significant Imaging:  I have reviewed all pertinent imaging results/findings within the past 24 hours.    Assessment/Plan:     * Acute on chronic combined systolic and diastolic congestive heart failure, NYHA class 4  Kevan Queen is a 37yoM with a history of polysubstance abuse who presented to the hospital with decompensated heart failure. He underwent LVAD, RVAD and MVr on 6/29/22. He is admitted to the ICU post-operatively.       Neuro/Psych:   -- Sedation: none  -- Pain: PRN fentanyl             Cards:   -- s/p LVAD, RVAD insertion and MVr. RVAD removed 7/1  -- HDS on epi 0.07, dobutamine 2.5 for vasopressor support  -- s/p chest closure 6/30   -- MAP goal 75-85  -- Cardene for hypertension  -- Plan to remove left CT today      Pulm:   -- Goal O2 sat  > 90%  -- ABG PRN  -- SBT with possible extubation today  -- weaned to minimal ventilatory support  -- iNO2 @ 5ppm  -- daily CXR      Renal:  -- Keep melendez for strict I/O  -- Trend BUN/Cr   -- 2L intra-operative UOP  -- lasix gtt   -- net negative 700mL over last 24hrs      FEN / GI:   -- Replace lytes as needed  -- Nutrition: full liquid  -- GI ppx: famotidine  -- Bowel reg: miralax  -- Hepatology consult to acute liver failure  -- continue NAC      ID:   -- Tm: febrile to 102.2; WBC stable  -- will consult ID to discuss abx that are not nephrotoxic      Heme/Onc:   -- H/H stable   -- Daily CBC  -- no intra-op product administered  -- 2u autologous  -- 1u pRBC, 1u FFP, 500mL albumin overnight (6/29)  -- Heparin gtt, increased to 800 this morning      Endo:   -- BG goal 140-180  -- Insulin gtt      PPx:   Feeding: full liquid diet  Analgesia/Sedation:none / PRN Fentanyl  Thromboembolic prevention: SCDs, heparin gtt  HOB >30: Yes  Stress Ulcer ppx: famotidine  Glucose control: Critical care goal 140-180 g/dl, ISS     Lines/Drains/Airway: CVC RIJ, Melendez, Chest tubes x 2, L brachial A-line; LVAD      Dispo/Code Status/Palliative:   -- SICU / Full Code.            Rebeca Horvath MD  Critical Care - Surgery  [facility] - Surgical Intensive [facility]

## 2022-07-02 NOTE — PLAN OF CARE
Patient remains on 4 L NC with 20 ppm nitric oxide. Epinephrine weaned to 0.06 mcg/kg/min today. Heparin increased to 800 Units/hr, PTT goal 35-45. Lasix increased to 15 mg/hr, due to elevated CVP of 21. Lasix 20 mg IV push given, U/O remains 250-350cc/hr. Most recent CVP now 18. Cardene started today for MAPS >90. Cardene infusing at 7.5 mg/hr. MAP goal 75-85. Dobutamine remains at 2.5 mcg/kg/min. Insulin increased to 4 Units/hr, Accuchecks AC/HS, sliding scale insulin administered q4hr. LVAD speed at 5200, all VAD numbers stable. Patient temp increased throughout day, T Max 101.3, blood cultures repeated. Left pleural chest tube removed today. Patient worked with PT/OT. First LVAD dressing completed per SICU RN. Labs monitored and electrolytes replaced. Diet advanced to cardiac diet with 1500cc Fluid restriction. Patient educated on importance of fluid restriction. Plan of care reviewed with patient and significant other at bedside. All questions answered.

## 2022-07-02 NOTE — ASSESSMENT & PLAN NOTE
Kevan Queen is a 37yoM with a history of polysubstance abuse who presented to the hospital with decompensated heart failure. He underwent LVAD, RVAD and MVr on 6/29/22. He is admitted to the ICU post-operatively.       Neuro/Psych:   -- Sedation: none  -- Pain: PRN fentanyl             Cards:   -- s/p LVAD, RVAD insertion and MVr. RVAD removed 7/1  -- HDS on epi 0.07, dobutamine 2.5 for vasopressor support  -- s/p chest closure 6/30   -- MAP goal 75-85  -- Cardene for hypertension  -- Plan to remove left CT today      Pulm:   -- Goal O2 sat > 90%  -- ABG PRN  -- SBT with possible extubation today  -- weaned to minimal ventilatory support  -- iNO2 @ 5ppm  -- daily CXR      Renal:  -- Keep melendez for strict I/O  -- Trend BUN/Cr   -- 2L intra-operative UOP  -- lasix gtt   -- net negative 700mL over last 24hrs      FEN / GI:   -- Replace lytes as needed  -- Nutrition: full liquid  -- GI ppx: famotidine  -- Bowel reg: miralax  -- Hepatology consult to acute liver failure  -- continue NAC      ID:   -- Tm: febrile to 102.2; WBC stable  -- will consult ID to discuss abx that are not nephrotoxic      Heme/Onc:   -- H/H stable   -- Daily CBC  -- no intra-op product administered  -- 2u autologous  -- 1u pRBC, 1u FFP, 500mL albumin overnight (6/29)  -- Heparin gtt, increased to 800 this morning      Endo:   -- BG goal 140-180  -- Insulin gtt      PPx:   Feeding: full liquid diet  Analgesia/Sedation:none / PRN Fentanyl  Thromboembolic prevention: SCDs, heparin gtt  HOB >30: Yes  Stress Ulcer ppx: famotidine  Glucose control: Critical care goal 140-180 g/dl, ISS     Lines/Drains/Airway: CVC RIJ, Melendez, Chest tubes x 2, L brachial A-line; LVAD      Dispo/Code Status/Palliative:   -- SICU / Full Code.

## 2022-07-02 NOTE — SUBJECTIVE & OBJECTIVE
"Interval History:     Pt s/e this am. Pt's main complaint with diffuse pain "everywhere." No shortness of breath, light headedness. He had an eventful night. Rapid rise of his LDH and LFTs. His RVAD was removed night of 7/1. His nitric was increased to 20 ppm. He urine output began to drop, so he was given dose of diuril with robust response.     CVP trending up 12 > 14 > 15 but his urine output remains high. He remains on epi 0.06, Marcella 20 ppm, and lasix ggt 7.5/hr    Continuous Infusions:   sodium chloride 0.9% 5 mL/hr at 06/27/22 0055    sodium chloride 0.9% Stopped (07/02/22 0521)    acetylcysteine (ACETADOTE) infusion *optional fourth dose* 6.25 mg/kg/hr (07/02/22 1200)    DOBUTamine IV infusion (non-titrating) 2.5 mcg/kg/min (07/02/22 1200)    EPINEPHrine 0.06 mcg/kg/min (07/02/22 1200)    furosemide (LASIX) 2 mg/mL continuous infusion (non-titrating) 7.5 mg/hr (07/02/22 1200)    heparin (porcine) in 5 % dex Stopped (07/02/22 1056)    insulin regular 1 units/mL infusion orderable (TRANSFER) 3 Units/hr (07/02/22 1200)    nicardipine 7.5 mg/hr (07/02/22 1200)    nitric oxide gas      propofoL Stopped (07/01/22 1048)    vasopressin Stopped (07/02/22 0406)     Scheduled Meds:   albumin human 5%  25 g Intravenous Once    DAPTOmycin (CUBICIN)  IV  8 mg/kg Intravenous Q24H    famotidine (PF)  20 mg Intravenous BID    ferrous gluconate  324 mg Oral Daily with breakfast    mupirocin   Nasal BID    polyethylene glycol  17 g Per NG tube Daily    warfarin  2 mg Oral Daily     PRN Meds:sodium chloride, sodium chloride, sodium chloride, albumin human 5%, albuterol sulfate, bisacodyL, dextrose 10%, dextrose 10%, fentaNYL, glucagon (human recombinant), glucose, glucose, insulin aspart U-100, magnesium hydroxide 400 mg/5 ml, magnesium sulfate IVPB, potassium chloride, potassium chloride, sodium chloride 0.9%, sodium chloride 0.9%    Review of patient's allergies indicates:   Allergen Reactions    Aspirin Other (See Comments)    "  Mr. Thacker is enrolled in Dr. Paige's Alon Trial and cannot have any aspirin and/or aspirin-containing products. DO NOT cancel any orders for INV Aspirin 100 mg/Placebo. If you have questions, please contact Isabel @ [phone], [phone],[email or web address], secure chat or MS Teams message.    Bumex [bumetanide] Hives    Lactose Diarrhea     Other reaction(s): Abdominal distension, gaseous    Torsemide Hives     Objective:     Vital Signs (Most Recent):  Temp: (!) 100.58 °F (38.1 °C) (07/02/22 1200)  Pulse: (!) 117 (07/02/22 1200)  Resp: (!) 47 (07/02/22 1200)  BP: (!) 92/0 (07/02/22 0800)  SpO2: 96 % (07/02/22 1200)   Vital Signs (24h Range):  Temp:  [100.22 °F (37.9 °C)-102.2 °F (39 °C)] 100.58 °F (38.1 °C)  Pulse:  [109-121] 117  Resp:  [15-58] 47  SpO2:  [92 %-100 %] 96 %  BP: (92)/(0) 92/0  Arterial Line BP: ()/(65-85) 90/76     No data found.  Body mass index is 25.82 kg/m².      Intake/Output Summary (Last 24 hours) at 7/2/2022 1311  Last data filed at 7/2/2022 1200  Gross per 24 hour   Intake 3264.82 ml   Output 4885 ml   Net -1620.18 ml       Hemodynamic Parameters:         Physical Exam  Constitutional:       Comments: Intubated and sedated   HENT:      Head: Normocephalic and atraumatic.   Eyes:      Conjunctiva/sclera: Conjunctivae normal.      Pupils: Pupils are equal, round, and reactive to light.   Neck:      Comments: Left IJ TLC  Cardiovascular:      Comments: Smooth VAD hum  Pulmonary:      Breath sounds: Normal breath sounds.   Abdominal:      Palpations: Abdomen is soft.      Comments: Hypoactive bowel sounds   Musculoskeletal:         General: No swelling.      Cervical back: Neck supple.      Left lower leg: Edema present.   Skin:     General: Skin is dry.      Capillary Refill: Capillary refill takes 2 to 3 seconds.      Comments: Extremities cool to touch with fans   Neurological:      Comments: Pt noted to have difficulty finding words   Psychiatric:         Mood and Affect:  Mood normal.         Behavior: Behavior normal.       Significant Labs:  CBC:  Recent Labs   Lab 07/02/22  0309 07/02/22  0805 07/02/22  1200   WBC 26.58* 24.99* 24.96*   RBC 2.91* 2.91* 2.94*   HGB 8.5* 8.4* 8.5*   HCT 25.7* 25.5* 25.2*    195 198   MCV 88 88 86   MCH 29.2 28.9 28.9   MCHC 33.1 32.9 33.7     BNP:  Recent Labs   Lab 06/28/22  0921 07/01/22  0411   * 520*     CMP:  Recent Labs   Lab 07/02/22  0309 07/02/22  0805 07/02/22  1200   * 228* 219*   CALCIUM 9.2 9.0 9.3   ALBUMIN 3.5 3.6 3.5   PROT 6.9 7.1 7.2    137 136   K 3.7 3.5 3.9   CO2 21* 23 24    99 99   BUN 64* 67* 64*   CREATININE 2.1* 2.1* 1.8*   ALKPHOS 87 88 91   * 948* 956*   AST 1,715* 1,507* 1,293*   BILITOT 2.4* 2.3* 2.4*      Coagulation:   Recent Labs   Lab 07/02/22  0309 07/02/22  0805 07/02/22  1200   INR 1.1 1.1 1.1   APTT 28.0  27.7 27.1 26.0     LDH:  Recent Labs   Lab 06/29/22  1809 06/30/22  0400 07/01/22  0412 07/01/22 1807 07/02/22  0309 07/02/22  0855   * 798*  798* 1,040* 2,023* 2,838* 2,470*     Microbiology:  Microbiology Results (last 7 days)       Procedure Component Value Units Date/Time    Blood culture [[id number]] Collected: 06/30/22 1836    Order Status: Completed Specimen: Blood from Peripheral, Wrist, Left Updated: 07/01/22 2012     Blood Culture, Routine No Growth to date      No Growth to date    Blood culture [[id number]] Collected: 06/30/22 1833    Order Status: Completed Specimen: Blood from Peripheral, Forearm, Right Updated: 07/01/22 2012     Blood Culture, Routine No Growth to date      No Growth to date    Blood culture [[id number]]  (Abnormal)  (Susceptibility) Collected: 06/23/22 0108    Order Status: Completed Specimen: Blood from Peripheral, Hand, Right Updated: 07/01/22 1137     Blood Culture, Routine Gram stain dudley bottle: Gram positive cocci in clusters resembling Staph       Results called to and read back by: Mark Pickett RN  06/24/2022        14:36       STAPHYLOCOCCUS EPIDERMIDIS    Blood culture [[id number]] Collected: 06/25/22 0634    Order Status: Completed Specimen: Blood from Peripheral, Wrist, Left Updated: 06/30/22 0812     Blood Culture, Routine No growth after 5 days.    Blood culture [[id number]] Collected: 06/25/22 0633    Order Status: Completed Specimen: Blood from Peripheral, Antecubital, Right Updated: 06/30/22 0812     Blood Culture, Routine No growth after 5 days.    Blood culture [[id number]] Collected: 06/23/22 0105    Order Status: Completed Specimen: Blood from Peripheral, Antecubital, Right Updated: 06/28/22 0612     Blood Culture, Routine No growth after 5 days.    Blood culture [[id number]]  (Abnormal)  (Susceptibility) Collected: 06/21/22 2250    Order Status: Completed Specimen: Blood from Peripheral, Wrist, Left Updated: 06/26/22 1059     Blood Culture, Routine Gram stain dudley bottle: Gram positive cocci in clusters resembling Staph      Results called to and read back by: Rosa Pardo RN. 06/22/2022  23:29      Gram stain aer bottle: Gram positive cocci in clusters resembling Staph       Positive results previously called 06/23/2022  04:33      STAPHYLOCOCCUS EPIDERMIDIS

## 2022-07-02 NOTE — PROGRESS NOTES
Dr. Paige updated on patient VS, including MAPs 90s and CVP now 15. Cardene infusion started to maintain a MAP 75-85.  Notified MD of most recent lab results including AST/ALT. Patient plan discussed with MD.

## 2022-07-02 NOTE — ASSESSMENT & PLAN NOTE
Endocrinology consulted for BG management.   BG goal 140-180      Increase to transition insulin infusion at 4 u/hr  BG monitoring ac/hs/0200 and moderate dose correction scale.     ** Please notify Endocrine for any change and/or advance in diet**  ** Please call Endocrine for any BG related issues **    Discharge Planning:   TBD. Please notify endocrinology prior to discharge.

## 2022-07-02 NOTE — SUBJECTIVE & OBJECTIVE
"Interval HPI:   Overnight events:Remains in ICU. NAEON. Wound vac. Pressors. LVAD. BG above goal on IV insulin infusion at 3 u/hr with prn correction scale.   Eating:  Diet full liquid Fluid - 1500mL   50%  Nausea: No  Hypoglycemia and intervention: No  Fever: No  TPN and/or TF: No    BP (!) 90/0 (BP Location: Right arm, Patient Position: Lying)   Pulse (!) 121   Temp (!) 101.12 °F (38.4 °C)   Resp (!) 42   Ht 6' 3" (1.905 m)   Wt 93.7 kg (206 lb 9.1 oz)   SpO2 (!) 89%   BMI 25.82 kg/m²     Labs Reviewed and Include    Recent Labs   Lab 07/02/22  1200   *   CALCIUM 9.3   ALBUMIN 3.5   PROT 7.2      K 3.9   CO2 24   CL 99   BUN 64*   CREATININE 1.8*   ALKPHOS 91   *   AST 1,293*   BILITOT 2.4*     Lab Results   Component Value Date    WBC 24.96 (H) 07/02/2022    HGB 8.5 (L) 07/02/2022    HCT 25.2 (L) 07/02/2022    MCV 86 07/02/2022     07/02/2022     No results for input(s): TSH, FREET4 in the last 168 hours.  Lab Results   Component Value Date    HGBA1C 9.2 (H) 05/20/2022       Nutritional status:   Body mass index is 25.82 kg/m².  Lab Results   Component Value Date    ALBUMIN 3.5 07/02/2022    ALBUMIN 3.6 07/02/2022    ALBUMIN 3.5 07/02/2022     Lab Results   Component Value Date    PREALBUMIN 19 (L) 06/30/2022    PREALBUMIN 36 04/18/2022       Estimated Creatinine Clearance: 67.2 mL/min (A) (based on SCr of 1.8 mg/dL (H)).    Accu-Checks  Recent Labs     07/01/22  1157 07/01/22  1331 07/01/22  1407 07/01/22  1503 07/01/22  1637 07/01/22  1711 07/01/22  2133 07/02/22  0259 07/02/22  0804 07/02/22  1201   POCTGLUCOSE 193* 192* 173* 195* 176* 161* 179* 220* 235* 251*       Current Medications and/or Treatments Impacting Glycemic Control  Immunotherapy:    Immunosuppressants       None          Steroids:   Hormones (From admission, onward)                Start     Stop Route Frequency Ordered    07/01/22 2030  vasopressin (PITRESSIN) 0.2 Units/mL in dextrose 5 % 100 mL infusion      "    -- IV Continuous 07/01/22 2035          Pressors:    Autonomic Drugs (From admission, onward)                Start     Stop Route Frequency Ordered    06/29/22 1915  EPINEPHrine (ADRENALIN) 5 mg in dextrose 5 % 250 mL infusion         -- IV Continuous 06/29/22 1904          Hyperglycemia/Diabetes Medications:   Antihyperglycemics (From admission, onward)                Start     Stop Route Frequency Ordered    07/01/22 1845  insulin regular in 0.9 % NaCl 100 unit/100 mL (1 unit/mL) infusion        Question:  Enter initial dose (Units/hr):  Answer:  3    -- IV Continuous 07/01/22 1733 07/01/22 1833  insulin aspart U-100 pen 0-10 Units         -- SubQ As needed (PRN) 07/01/22 1733

## 2022-07-02 NOTE — PROGRESS NOTES
Dr. Paige and Dr. Horvath informed pt UOP 60mL, CVP 12, , , LDH 2023. Dr. Dowell called to bedside per Dr. Paige to remove RVAD. Nitric increased to 20ppm, epi gtt increased to 0.08mcg/kg/min, vaso gtt started @ 0.04units/hr. 1 unit PRBCs ordered to infuse while removing RVAD. Labs to be sent post rvad removal.

## 2022-07-02 NOTE — PROGRESS NOTES
Dr. Paige notified of pt drowsy, expressive aphasia, pupils equal and reactive, moves all extremities equally, received 0.5 mg Dilaudid @ 1910 prior to removing the RVAD. Per MD Oxycodone and Dilaudid DC/ed, ok to give 25 mcg Fentanyl Q4h PRN if pt able to state pain. Pt and significant other updated on POC.

## 2022-07-02 NOTE — SUBJECTIVE & OBJECTIVE
Review of Systems   Constitutional:  Positive for fatigue. Negative for appetite change, chills and fever.   HENT:  Negative for congestion, sore throat and trouble swallowing.    Eyes:  Negative for photophobia, pain and discharge.   Respiratory:  Positive for chest tightness. Negative for cough and shortness of breath.    Cardiovascular:  Negative for chest pain and palpitations.   Gastrointestinal:  Positive for abdominal pain. Negative for diarrhea, nausea and vomiting.   Genitourinary:  Negative for difficulty urinating.   Musculoskeletal:  Negative for back pain, myalgias and neck pain.   Skin:  Negative for pallor and rash.   Neurological:  Negative for light-headedness, numbness and headaches.     Past Medical History:   Diagnosis Date    Arthritis     Cardiomyopathy     CHF (congestive heart failure) 10/01/2020    Diabetes mellitus     Dilated cardiomyopathy 10/26/2020    Drug abuse 10/2020    Hyperosmolar hyperglycemic state (HHS) 5/25/2022    ICD (implantable cardioverter-defibrillator) in place 10/26/2020    Muscle cramping 6/15/2022    Renal disorder        Past Surgical History:   Procedure Laterality Date    APPLICATION OF WOUND VACUUM-ASSISTED CLOSURE DEVICE N/A 6/30/2022    Procedure: APPLICATION, WOUND VAC;  Surgeon: Luis F Paige MD;  Location: [facility] OR [address] [place] [address];  Service: Cardiovascular;  Laterality: N/A;  50 x 5 cm    CARDIAC DEFIBRILLATOR PLACEMENT      IMPLANTATION OF RIGHT VENTRICULAR ASSIST DEVICE (RVAD) N/A 6/29/2022    Procedure: INSERTION, RVAD;  Surgeon: Luis F Paige MD;  Location: [address];  Service: Cardiovascular;  Laterality: N/A;    INSERTION OF GRAFT TO PERICARDIUM Right 6/30/2022    Procedure: INSERTION-RIGHT VENTRICULAR ASSIST DEVICE;  Surgeon: Luis F Paige MD;  Location: [facility] OR [address] [place] [address];  Service: Cardiovascular;  Laterality: Right;    IRRIGATION OF MEDIASTINUM  6/30/2022    Procedure: IRRIGATION, MEDIASTINUM;  Surgeon: Luis F Paige MD;  Location: [facility] OR [address] [place] [address];   Service: Cardiovascular;;    LEFT VENTRICULAR ASSIST DEVICE Left 2022    Procedure: INSERTION-LEFT VENTRICULAR ASSIST DEVICE;  Surgeon: Luis F Paige MD;  [facility];  Service: Cardiovascular;  Laterality: Left;    LEFT VENTRICULAR ASSIST DEVICE N/A 2022    Procedure: INSERTION-LEFT VENTRICULAR ASSIST DEVICE;  Surgeon: Luis F Paige MD;  Location: [facility] OR [facility];  Service: Cardiovascular;  Laterality: N/A;    RIGHT HEART CATHETERIZATION Right 2022    Procedure: INSERTION, CATHETER, RIGHT HEART;  Surgeon: Luca Lopez Jr., MD;  Location: [facility] CATH LAB;  Service: Cardiology;  Laterality: Right;    RIGHT HEART CATHETERIZATION Right 2022    Procedure: INSERTION, CATHETER, RIGHT HEART;  Surgeon: Josh Pulido MD;  Location: [facility] CATH LAB;  Service: Cardiology;  Laterality: Right;    STERNAL WOUND CLOSURE N/A 2022    Procedure: CLOSURE, WOUND, STERNUM;  Surgeon: Luis F Paige MD;  [facility];  Service: Cardiovascular;  Laterality: N/A;       Family history of liver disease: No    Review of patient's allergies indicates:   Allergen Reactions    Aspirin Other (See Comments)     Mr. Thacker is enrolled in Dr. Paige's Alon Trial and cannot have any aspirin and/or aspirin-containing products. DO NOT cancel any orders for INV Aspirin 100 mg/Placebo. If you have questions, please contact Isabel @ [phone], [phone],[email or web address], secure chat or MS Teams message.    Bumex [bumetanide] Hives    Lactose Diarrhea     Other reaction(s): Abdominal distension, gaseous    Torsemide Hives         Tobacco Use    Smoking status: Former Smoker     Packs/day: 0.50     Years: 16.00     Pack years: 8.00     Start date: 10/1/2004     Quit date: 2021     Years since quittin.1    Smokeless tobacco: Never Used   Substance and Sexual Activity    Alcohol use: Not Currently    Drug use: Not Currently     Types: Marijuana, MDMA (Ecstacy)    Sexual  "activity: Yes     Partners: Female     Birth control/protection: None       Medications Prior to Admission   Medication Sig Dispense Refill Last Dose    busPIRone (BUSPAR) 7.5 MG tablet Take 7.5 mg by mouth every 12 (twelve) hours.   6/12/2022 at Unknown time    EScitalopram oxalate (LEXAPRO) 5 MG Tab Take 1 tablet (5 mg total) by mouth once daily. 90 tablet 3 6/13/2022 at Unknown time    furosemide (LASIX) 80 MG tablet Take 80 mg by mouth 2 (two) times daily.   6/12/2022 at Unknown time    insulin aspart U-100 (NOVOLOG) 100 unit/mL (3 mL) InPn pen Inject 12 Units into the skin 3 (three) times daily. 30 mL 3 6/12/2022 at Unknown time    insulin detemir U-100 (LEVEMIR FLEXTOUCH) 100 unit/mL (3 mL) SubQ InPn pen Inject 23 Units into the skin once daily. 18 mL 3 6/12/2022 at Unknown time    mirtazapine (REMERON) 15 MG tablet Take 15 mg by mouth nightly.   6/12/2022 at Unknown time    pantoprazole (PROTONIX) 40 MG tablet Take 1 tablet (40 mg total) by mouth once daily. 30 tablet 11 6/12/2022 at Unknown time    potassium chloride SA (K-DUR,KLOR-CON) 20 MEQ tablet Take 2 tablets (40 mEq total) by mouth once daily. 60 tablet 11 6/12/2022 at Unknown time    sucralfate (CARAFATE) 1 gram tablet Take 1 g by mouth nightly.   6/12/2022 at Unknown time    albuterol (PROVENTIL/VENTOLIN HFA) 90 mcg/actuation inhaler INHALE 2 PUFFS BY MOUTH EVERY 4 HOURS AS NEEDED FOR COUGH OR WHEEZING       blood sugar diagnostic Strp Use to test blood glucose 4 (four) times daily. 200 each 3     blood-glucose meter Misc Use as instructed 1 each 0     lancets 33 gauge Misc Use to test blood glucose 4 (four) times daily. 200 each 3     milrinone (PRIMACOR) 1 mg/mL injection Inject into the vein.       milrinone 20mg/100ml D5W, 200mcg/ml, (PRIMACOR) 20 mg/100 mL (200 mcg/mL) infusion Inject 34.875 mcg/min into the vein continuous.       pen needle, diabetic 31 gauge x 1/4" Ndle 1 Device by Misc.(Non-Drug; Combo Route) route 4 (four) times daily. 180 " each 3     promethazine (PHENERGAN) 12.5 MG Tab Take 1 tablet (12.5 mg total) by mouth every 6 (six) hours as needed (nausea). 30 tablet 3     traMADoL (ULTRAM) 50 mg tablet Take 1 tablet (50 mg total) by mouth every 6 (six) hours as needed for Pain. 28 tablet 0        Objective:     Vital Signs (Most Recent):  Temp: (!) 100.58 °F (38.1 °C) (07/02/22 1200)  Pulse: (!) 117 (07/02/22 1200)  Resp: (!) 47 (07/02/22 1200)  BP: (!) 92/0 (07/02/22 0800)  SpO2: 96 % (07/02/22 1200)   Vital Signs (24h Range):  Temp:  [100.22 °F (37.9 °C)-102.2 °F (39 °C)] 100.58 °F (38.1 °C)  Pulse:  [109-121] 117  Resp:  [15-58] 47  SpO2:  [92 %-100 %] 96 %  BP: (92)/(0) 92/0  Arterial Line BP: ()/(64-85) 90/76     Weight: 93.7 kg (206 lb 9.1 oz) (06/29/22 0200)  Body mass index is 25.82 kg/m².    Physical Exam  Constitutional:       Appearance: Normal appearance.   Eyes:      General: No scleral icterus.     Conjunctiva/sclera: Conjunctivae normal.   Cardiovascular:      Rate and Rhythm: Regular rhythm. Tachycardia present.      Pulses: Normal pulses.      Heart sounds: Murmur heard.   Pulmonary:      Effort: Pulmonary effort is normal. No respiratory distress.      Breath sounds: Normal breath sounds.   Chest:      Comments: Dressing in place over sternum.   Abdominal:      General: Bowel sounds are normal. There is distension.      Palpations: Abdomen is soft.      Tenderness: There is no abdominal tenderness.      Comments: Large dressing placed over entire abdominal wall.    Skin:     General: Skin is warm and dry.      Findings: No bruising or rash.   Neurological:      Mental Status: He is alert and oriented to person, place, and time.      Comments: No asterixis.        MELD-Na score: 18 at 7/2/2022  8:05 AM  MELD score: 18 at 7/2/2022  8:05 AM  Calculated from:  Serum Creatinine: 2.1 mg/dL at 7/2/2022  8:05 AM  Serum Sodium: 137 mmol/L at 7/2/2022  8:05 AM  Total Bilirubin: 2.3 mg/dL at 7/2/2022  8:05 AM  INR(ratio): 1.1 at  7/2/2022  8:05 AM  Age: 37 years    Significant Labs:  Labs within the past month have been reviewed.    Significant Imaging:  X-Ray: Reviewed  US: Reviewed

## 2022-07-02 NOTE — PROGRESS NOTES
"[facility]  Heart Transplant  Progress Note    Patient Name: Kevan Queen  MRN: [id number]  Admission Date: 6/13/2022  Hospital Length of Stay: 19 days  Attending Physician: Luis F Paige MD  Primary Care Provider: ORALIA Cline  Principal Problem:Acute on chronic combined systolic and diastolic congestive heart failure, NYHA class 4    Subjective:     Interval History:     Pt s/e this am. Pt's main complaint with diffuse pain "everywhere." No shortness of breath, light headedness. He had an eventful night. Rapid rise of his LDH and LFTs. His RVAD was removed night of 7/1. His nitric was increased to 20 ppm. He urine output began to drop, so he was given dose of diuril with robust response.     CVP trending up 12 > 14 > 15 but his urine output remains high. He remains on epi 0.06, Marcella 20 ppm, and lasix ggt 7.5/hr    CTS primary    Continuous Infusions:   sodium chloride 0.9% 5 mL/hr at 06/27/22 0055    sodium chloride 0.9% Stopped (07/02/22 0521)    acetylcysteine (ACETADOTE) infusion *optional fourth dose* 6.25 mg/kg/hr (07/02/22 1200)    DOBUTamine IV infusion (non-titrating) 2.5 mcg/kg/min (07/02/22 1200)    EPINEPHrine 0.06 mcg/kg/min (07/02/22 1200)    furosemide (LASIX) 2 mg/mL continuous infusion (non-titrating) 7.5 mg/hr (07/02/22 1200)    heparin (porcine) in 5 % dex Stopped (07/02/22 1056)    insulin regular 1 units/mL infusion orderable (TRANSFER) 3 Units/hr (07/02/22 1200)    nicardipine 7.5 mg/hr (07/02/22 1200)    nitric oxide gas      propofoL Stopped (07/01/22 1048)    vasopressin Stopped (07/02/22 0406)     Scheduled Meds:   albumin human 5%  25 g Intravenous Once    DAPTOmycin (CUBICIN)  IV  8 mg/kg Intravenous Q24H    famotidine (PF)  20 mg Intravenous BID    ferrous gluconate  324 mg Oral Daily with breakfast    mupirocin   Nasal BID    polyethylene glycol  17 g Per NG tube Daily    warfarin  2 mg Oral Daily     PRN Meds:sodium chloride, sodium " chloride, sodium chloride, albumin human 5%, albuterol sulfate, bisacodyL, dextrose 10%, dextrose 10%, fentaNYL, glucagon (human recombinant), glucose, glucose, insulin aspart U-100, magnesium hydroxide 400 mg/5 ml, magnesium sulfate IVPB, potassium chloride, potassium chloride, sodium chloride 0.9%, sodium chloride 0.9%    Review of patient's allergies indicates:   Allergen Reactions    Aspirin Other (See Comments)     Mr. Thacker is enrolled in Dr. Paige's Alon Trial and cannot have any aspirin and/or aspirin-containing products. DO NOT cancel any orders for INV Aspirin 100 mg/Placebo. If you have questions, please contact Isabel @ [phone], [phone],[email or web address], secure chat or MS Teams message.    Bumex [bumetanide] Hives    Lactose Diarrhea     Other reaction(s): Abdominal distension, gaseous    Torsemide Hives     Objective:     Vital Signs (Most Recent):  Temp: (!) 100.58 °F (38.1 °C) (07/02/22 1200)  Pulse: (!) 117 (07/02/22 1200)  Resp: (!) 47 (07/02/22 1200)  BP: (!) 92/0 (07/02/22 0800)  SpO2: 96 % (07/02/22 1200)   Vital Signs (24h Range):  Temp:  [100.22 °F (37.9 °C)-102.2 °F (39 °C)] 100.58 °F (38.1 °C)  Pulse:  [109-121] 117  Resp:  [15-58] 47  SpO2:  [92 %-100 %] 96 %  BP: (92)/(0) 92/0  Arterial Line BP: ()/(65-85) 90/76     No data found.  Body mass index is 25.82 kg/m².      Intake/Output Summary (Last 24 hours) at 7/2/2022 1311  Last data filed at 7/2/2022 1200  Gross per 24 hour   Intake 3264.82 ml   Output 4885 ml   Net -1620.18 ml       Hemodynamic Parameters:         Physical Exam  Constitutional:       Comments: Intubated and sedated   HENT:      Head: Normocephalic and atraumatic.   Eyes:      Conjunctiva/sclera: Conjunctivae normal.      Pupils: Pupils are equal, round, and reactive to light.   Neck:      Comments: Left IJ TLC  Cardiovascular:      Comments: Smooth VAD hum  Pulmonary:      Breath sounds: Normal breath sounds.   Abdominal:      Palpations: Abdomen  is soft.      Comments: Hypoactive bowel sounds   Musculoskeletal:         General: No swelling.      Cervical back: Neck supple.      Left lower leg: Edema present.   Skin:     General: Skin is dry.      Capillary Refill: Capillary refill takes 2 to 3 seconds.      Comments: Extremities cool to touch with fans   Neurological:      Comments: Pt noted to have difficulty finding words   Psychiatric:         Mood and Affect: Mood normal.         Behavior: Behavior normal.       Significant Labs:  CBC:  Recent Labs   Lab 07/02/22  0309 07/02/22  0805 07/02/22  1200   WBC 26.58* 24.99* 24.96*   RBC 2.91* 2.91* 2.94*   HGB 8.5* 8.4* 8.5*   HCT 25.7* 25.5* 25.2*    195 198   MCV 88 88 86   MCH 29.2 28.9 28.9   MCHC 33.1 32.9 33.7     BNP:  Recent Labs   Lab 06/28/22  0921 07/01/22  0411   * 520*     CMP:  Recent Labs   Lab 07/02/22  0309 07/02/22  0805 07/02/22  1200   * 228* 219*   CALCIUM 9.2 9.0 9.3   ALBUMIN 3.5 3.6 3.5   PROT 6.9 7.1 7.2    137 136   K 3.7 3.5 3.9   CO2 21* 23 24    99 99   BUN 64* 67* 64*   CREATININE 2.1* 2.1* 1.8*   ALKPHOS 87 88 91   * 948* 956*   AST 1,715* 1,507* 1,293*   BILITOT 2.4* 2.3* 2.4*      Coagulation:   Recent Labs   Lab 07/02/22  0309 07/02/22  0805 07/02/22  1200   INR 1.1 1.1 1.1   APTT 28.0  27.7 27.1 26.0     LDH:  Recent Labs   Lab 06/29/22  1809 06/30/22  0400 07/01/22  0412 07/01/22 1807 07/02/22  0309 07/02/22  0855   * 798*  798* 1,040* 2,023* 2,838* 2,470*     Microbiology:  Microbiology Results (last 7 days)       Procedure Component Value Units Date/Time    Blood culture [[id number]] Collected: 06/30/22 1836    Order Status: Completed Specimen: Blood from Peripheral, Wrist, Left Updated: 07/01/22 2012     Blood Culture, Routine No Growth to date      No Growth to date    Blood culture [[id number]] Collected: 06/30/22 1833    Order Status: Completed Specimen: Blood from Peripheral, Forearm, Right Updated: 07/01/22 2012      Blood Culture, Routine No Growth to date      No Growth to date    Blood culture [[id number]]  (Abnormal)  (Susceptibility) Collected: 06/23/22 0108    Order Status: Completed Specimen: Blood from Peripheral, Hand, Right Updated: 07/01/22 1137     Blood Culture, Routine Gram stain dudley bottle: Gram positive cocci in clusters resembling Staph       Results called to and read back by: Mark Pickett RN  06/24/2022        14:36      STAPHYLOCOCCUS EPIDERMIDIS    Blood culture [[id number]] Collected: 06/25/22 0634    Order Status: Completed Specimen: Blood from Peripheral, Wrist, Left Updated: 06/30/22 0812     Blood Culture, Routine No growth after 5 days.    Blood culture [[id number]] Collected: 06/25/22 0633    Order Status: Completed Specimen: Blood from Peripheral, Antecubital, Right Updated: 06/30/22 0812     Blood Culture, Routine No growth after 5 days.    Blood culture [[id number]] Collected: 06/23/22 0105    Order Status: Completed Specimen: Blood from Peripheral, Antecubital, Right Updated: 06/28/22 0612     Blood Culture, Routine No growth after 5 days.    Blood culture [[id number]]  (Abnormal)  (Susceptibility) Collected: 06/21/22 2250    Order Status: Completed Specimen: Blood from Peripheral, Wrist, Left Updated: 06/26/22 1059     Blood Culture, Routine Gram stain dudley bottle: Gram positive cocci in clusters resembling Staph      Results called to and read back by: Rosa Pardo RN. 06/22/2022  23:29      Gram stain aer bottle: Gram positive cocci in clusters resembling Staph       Positive results previously called 06/23/2022  04:33      STAPHYLOCOCCUS EPIDERMIDIS          Assessment and Plan:     38 yo male with NIDCM with BiV systolic heart failure, on home Milrinone at 0.375 mcg/kg/min, presented at [facility] 4/2/22 ,was not evaluated for OHT as he has recently quit smoking in April 2022 but was approved for VAD with plan to begin OHT evaluation in upcoming months if Mr Queen is stable and suitable for  "OHT felecia (blood group A), 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 Went to ED at [facility] 6/10 but left after waiting 4 hours. Had clinic appointment with us today, but arrived to [facility] early this morning and decided to go to the ED instead. Baseline Lasix dose is 80 mg bid. Reports taking 240 mg qd past 3 days with no improvement. BNP is 1701, up from 898 on 6/2 and 49 on 5/24. sCr is 1.8 with baseline ~ 1.5-1.7. sPO2 on RA is 93%. Wife at bedside    He has been given Lasix 80 mg IVP in the ED with plan to start Lasix gtt at 20 mg/hr           * Acute on chronic combined systolic and diastolic congestive heart failure, NYHA class 4  - NID  - Was not evaluated for OHTx as he quit smoking in April 2022  - Received HM3 on 6/29  - See LVAD    Unlikely OHTx candidate with smoking history (quit April 2022)     LVAD (left ventricular assist device) present  -S/P DT HM3 with MVR plus Protek Duo for RV support 6/29 (Grecia)  -CTS primary  -LVAD speed 5000 with flows 3.7-3.8, RVAD speed 5100 with flows 3.2-3.4 - Grecia aware  - Epi 0.06. On  2.5 and Marcella 5 ppm.  - on 7/2, rvad removed with concern for hemolysis, Marcella increased to 20, LVAD speed increased to 5200   Cardene prn to keep MAP 75-80  -Post op antibiotic per CTS (h/o Staph epi bacteremia, cleareed by ID for surgery with rec to continue antibiotic coverage for 2 weeks post op)  -AC per CTS      Procedure: Device Interrogation Including analysis of device parameters  Current Settings: Ventricular Assist Device  Review of device function is stable    TXP LVAD INTERROGATIONS 7/2/2022 7/2/2022 7/2/2022 7/2/2022 7/2/2022 7/2/2022 7/2/2022 "   Type HeartMate3 HeartMate3 HeartMate3 HeartMate3 HeartMate3 HeartMate3 HeartMate3   Flow 4.3 4.4 4.4 4.2 3.9 4.1 3.8   Speed 5200 5200 5200 5200 5200 5200 5200   PI 3.6 3.2 3.2 4.1 5.9 5 5.4   Power (Serna) 3.7 3.7 3.6 3.8 3.7 3.7 3.7   LSL 4800 4800 4800 4800 4800 4800 4800   Pulsatility - - - - - - -       Staphylococcus epidermidis bacteremia  -Blood cultures 6/20 and repeat 6/21 positive for Staph Epi. One of two blood cultures from 6/23 positive for Staph (taken prior to line exchange). Repeat cultures sent 6/25 NGTD.   -IJ exchanged on 6/23, IABP exchanged 6/23  -Appreciate ID's help again:   Currently on IV Dapto per ID recs         Uncontrolled diabetes mellitus  -Admitted 5/24-5/27 with hyperglycemia hyperosmolar syndrome  -Hgb A1C 9.2 on 5/20/22  -Endocrine following, on insulin gtt    CKD (chronic kidney disease) stage 3, GFR 30-59 ml/min  - Admit sCr 1.8, baseline ~ 1.5-1.7  - Creatinine currently 2.1, stable  - Lasix 7.5 mg/hr - robust UOP response after given diuril last night, making ~200 cc/hr    Cardiogenic shock  -See LVAD        René Barnes MD  Heart Transplant  [facility]

## 2022-07-02 NOTE — PROGRESS NOTES
Dr. Paige at bedside, updated on patient's current condition. Notified of all gtt rates, VS including CVP up to 14 and elevated MAPs 85-90, VAD numbers, output, recent lab results and AM CXR. Heparin infusion increased to 800 Units/hr, PTT goal 35-45 per Dr. Paige order. Albumin infusion stopped per Dr. Paige order. U/O remains 200-300 cc/hr. Labs pending at this time. Received order to repeat LDH. Plan is to remove L pleural chest tube today, will hold heparin 1 hour prior to removal. Plan of care reviewed with Dr. Paige. Family updated on plan.

## 2022-07-02 NOTE — HPI
Mr. Kevan Queen is a 37-year-old male for whom hepatology is consulted with concern for acute liver failure. He has a PMH significant for non-ischemic cardiomyopathy with combined CHF (on home Milrinone and status post ICD placement), CKD III, and tobacco abuse.     Hospital Course: Patient was admitted to [facility] on 06/13/2022 for management of CHF exacerbation. He was admitted to [facility] and initiated on IV Lasix with continuation of home Milrinone. IABP placed on admission due to concern for cardiogenic shock. Hospital course associated with onset of S.epidermidis bacteremia on 06/20; ID consulted and patient initiated on Vancomycin with blood cultures remaining NGTD as of 06/25. He then underwent LVAD placement on 06/29. On post-op day #2, patient was noted to develop new transaminitis (AST 1469, ) with normal AlkP and bilirubin associated with onset of fever (Tmax 103). Sepsis work-up repeated and antibiotics broadened to Cefepime and Daptomycin. Hepatology consulted on 07/02 for comment on liver enzyme abnormality.     On initial bedside interview, patient reported fatigue and generalized chest and abdominal soreness since LVAD placement. He denies prior history of liver disease, family history of liver disease, or preceding ETOH usage before this hospitalization.

## 2022-07-02 NOTE — SUBJECTIVE & OBJECTIVE
"Interval HPI:   Overnight events: Remains in ICU. NAEON. Wound vac. Pressors. LVAD. BG above goal on IV insulin infusion at 3 u/hr with prn correction scale. diet advancing. 3 Days Post-Op  Eating:  Diet [facility]; Isolation Tray - Regular [place]; Fluid - 1500mL   50%  Nausea: No  Hypoglycemia and intervention: No  Fever: No  TPN and/or TF: No    BP (!) 90/0 (BP Location: Right arm, Patient Position: Lying)   Pulse 107   Temp 98.78 °F (37.1 °C)   Resp 15   Ht 6' 3" (1.905 m)   Wt 93.7 kg (206 lb 9.1 oz)   SpO2 96%   BMI 25.82 kg/m²     Labs Reviewed and Include    Recent Labs   Lab 07/03/22  0848   *   CALCIUM 9.0   ALBUMIN 3.4*   PROT 7.3      K 3.6   CO2 24      BUN 56*   CREATININE 1.4   ALKPHOS 109   *   *   BILITOT 1.8*     Lab Results   Component Value Date    WBC 24.35 (H) 07/03/2022    HGB 8.1 (L) 07/03/2022    HCT 24.4 (L) 07/03/2022    MCV 85 07/03/2022     07/03/2022     No results for input(s): TSH, FREET4 in the last 168 hours.  Lab Results   Component Value Date    HGBA1C 9.2 (H) 05/20/2022       Nutritional status:   Body mass index is 25.82 kg/m².  Lab Results   Component Value Date    ALBUMIN 3.4 (L) 07/03/2022    ALBUMIN 3.2 (L) 07/03/2022    ALBUMIN 3.3 (L) 07/03/2022     Lab Results   Component Value Date    PREALBUMIN 19 (L) 06/30/2022    PREALBUMIN 36 04/18/2022       Estimated Creatinine Clearance: 86.3 mL/min (based on SCr of 1.4 mg/dL).    Accu-Checks  Recent Labs     07/01/22  2133 07/02/22  0259 07/02/22  0804 07/02/22  1201 07/02/22  1736 07/02/22 2014 07/02/22 2015 07/02/22  2139 07/03/22  0157 07/03/22  0856   POCTGLUCOSE 179* 220* 235* 251* 307* 373* 378* 397* 243* 213*       Current Medications and/or Treatments Impacting Glycemic Control  Immunotherapy:    Immunosuppressants       None          Steroids:   Hormones (From admission, onward)                Start     Stop Route Frequency Ordered    07/01/22 2030  vasopressin " (PITRESSIN) 0.2 Units/mL in dextrose 5 % 100 mL infusion         -- IV Continuous 07/01/22 2035          Pressors:    Autonomic Drugs (From admission, onward)                Start     Stop Route Frequency Ordered    06/29/22 1915  EPINEPHrine (ADRENALIN) 5 mg in dextrose 5 % 250 mL infusion         -- IV Continuous 06/29/22 1904          Hyperglycemia/Diabetes Medications:   Antihyperglycemics (From admission, onward)                Start     Stop Route Frequency Ordered    07/03/22 1130  insulin aspart U-100 pen 5-8 Units         -- SubQ 3 times daily with meals 07/03/22 0827    07/01/22 1845  insulin regular in 0.9 % NaCl 100 unit/100 mL (1 unit/mL) infusion        Question:  Enter initial dose (Units/hr):  Answer:  3    -- IV Continuous 07/01/22 1733 07/01/22 1833  insulin aspart U-100 pen 0-10 Units         -- SubQ As needed (PRN) 07/01/22 6703

## 2022-07-02 NOTE — PROGRESS NOTES
"[facility]  Endocrinology  Progress Note    Admit Date: 6/13/2022     Reason for Consult: Management of  type 2 DM, Hyperglycemia     Surgical Procedure and Date: none    Diabetes diagnosis year: 4/21/21    Home Diabetes Medications:  Detemir  23  units daily and Aspart   12  units TIDWM and  Mod dose correction insulin    Lab Results   Component Value Date    HGBA1C 9.2 (H) 05/20/2022           How often checking glucose at home? 1-3 x day   BG readings on regimen: 180- up to 300 once  Hypoglycemia on the regimen?  yes once- related to not really eating after taking aspart   Missed doses on regimen?  no    Diabetes Complications include:     Hyperglycemia    Complicating diabetes co morbidities:   History of MI, CHF, and CKD      HPI:   Patient is a 37 y.o. male with a diagnosis of type 2 DM and NIDCM with BiV systolic heart failure. Patient was presented at [facility] 4/2/22  and was  approved for VAD. Also recently admitted with [facility]; he had clinic appointment last week to f/u recent admit for hyperglycemic hyperosmolar syndrome but did not come as he was "feeling too bad" presents to  ED with SOB. Endocrinology consulted for BG/ DM management.                Interval HPI:   Overnight events:Remains in ICU. NAEON. Wound vac. Pressors. LVAD. BG above goal on IV insulin infusion at 3 u/hr with prn correction scale.   Eating:  Diet full liquid Fluid - 1500mL   50%  Nausea: No  Hypoglycemia and intervention: No  Fever: No  TPN and/or TF: No    BP (!) 90/0 (BP Location: Right arm, Patient Position: Lying)   Pulse (!) 121   Temp (!) 101.12 °F (38.4 °C)   Resp (!) 42   Ht 6' 3" (1.905 m)   Wt 93.7 kg (206 lb 9.1 oz)   SpO2 (!) 89%   BMI 25.82 kg/m²     Labs Reviewed and Include    Recent Labs   Lab 07/02/22  1200   *   CALCIUM 9.3   ALBUMIN 3.5   PROT 7.2      K 3.9   CO2 24   CL 99   BUN 64*   CREATININE 1.8*   ALKPHOS 91   *   AST 1,293*   BILITOT 2.4*     Lab Results "   Component Value Date    WBC 24.96 (H) 07/02/2022    HGB 8.5 (L) 07/02/2022    HCT 25.2 (L) 07/02/2022    MCV 86 07/02/2022     07/02/2022     No results for input(s): TSH, FREET4 in the last 168 hours.  Lab Results   Component Value Date    HGBA1C 9.2 (H) 05/20/2022       Nutritional status:   Body mass index is 25.82 kg/m².  Lab Results   Component Value Date    ALBUMIN 3.5 07/02/2022    ALBUMIN 3.6 07/02/2022    ALBUMIN 3.5 07/02/2022     Lab Results   Component Value Date    PREALBUMIN 19 (L) 06/30/2022    PREALBUMIN 36 04/18/2022       Estimated Creatinine Clearance: 67.2 mL/min (A) (based on SCr of 1.8 mg/dL (H)).    Accu-Checks  Recent Labs     07/01/22  1157 07/01/22  1331 07/01/22  1407 07/01/22  1503 07/01/22  1637 07/01/22  1711 07/01/22  2133 07/02/22  0259 07/02/22  0804 07/02/22  1201   POCTGLUCOSE 193* 192* 173* 195* 176* 161* 179* 220* 235* 251*       Current Medications and/or Treatments Impacting Glycemic Control  Immunotherapy:    Immunosuppressants       None          Steroids:   Hormones (From admission, onward)                Start     Stop Route Frequency Ordered    07/01/22 2030  vasopressin (PITRESSIN) 0.2 Units/mL in dextrose 5 % 100 mL infusion         -- IV Continuous 07/01/22 2035          Pressors:    Autonomic Drugs (From admission, onward)                Start     Stop Route Frequency Ordered    06/29/22 1915  EPINEPHrine (ADRENALIN) 5 mg in dextrose 5 % 250 mL infusion         -- IV Continuous 06/29/22 1904          Hyperglycemia/Diabetes Medications:   Antihyperglycemics (From admission, onward)                Start     Stop Route Frequency Ordered    07/01/22 1845  insulin regular in 0.9 % NaCl 100 unit/100 mL (1 unit/mL) infusion        Question:  Enter initial dose (Units/hr):  Answer:  3    -- IV Continuous 07/01/22 1733    07/01/22 1833  insulin aspart U-100 pen 0-10 Units         -- SubQ As needed (PRN) 07/01/22 8835            ASSESSMENT and PLAN    * Acute on chronic  combined systolic and diastolic congestive heart failure, NYHA class 4  Optimize glucose control and  avoid hypoglycemia    Managed per primary.       Uncontrolled diabetes mellitus  Endocrinology consulted for BG management.   BG goal 140-180      Increase to transition insulin infusion at 4 u/hr  BG monitoring ac/hs/0200 and moderate dose correction scale.     ** Please notify Endocrine for any change and/or advance in diet**  ** Please call Endocrine for any BG related issues **    Discharge Planning:   TBD. Please notify endocrinology prior to discharge.        Surgical wound present  Optimize BG for surgical wound healing.         CKD (chronic kidney disease) stage 3, GFR 30-59 ml/min    Titrate insulin slowly to avoid hypoglycemia as the risk of hypoglycemia increases with decreased creatinine clearance.    Estimated Creatinine Clearance: 67.2 mL/min (A) (based on SCr of 1.8 mg/dL (H)).          Yumiko Fowler NP  Endocrinology  [facility]

## 2022-07-02 NOTE — PROGRESS NOTES
Dr. Paige notified of most recent VS, flow 3.5-3.7, orders to decrease Epi to 0.07 mcg/kg/min and Vasopressin to 0.03 Units/min. Plan to repeat ABG @ 2249.

## 2022-07-02 NOTE — PROGRESS NOTES
Unable to assess weight, bed remote is not working, not able to use a standing scale at this time.

## 2022-07-02 NOTE — ASSESSMENT & PLAN NOTE
Endocrinology consulted for BG management.   BG goal 140-180      Increase to transition insulin infusion at 4.8 u/hr  Start novolog 5- 8 units with meals.   BG monitoring ac/hs/0200 and moderate dose correction scale.     ** Please notify Endocrine for any change and/or advance in diet**  ** Please call Endocrine for any BG related issues **    Discharge Planning:   TBD. Please notify endocrinology prior to discharge.

## 2022-07-02 NOTE — SUBJECTIVE & OBJECTIVE
Interval History/Significant Events: RVAD clot overnight with subsequent removal at bedside. Increasing LFTs, LDH and CPK. Diurel and albumin given with increase in UOP. Successfully extubated yesterday.    Follow-up For: Procedure(s) (LRB):  CLOSURE, WOUND, STERNUM (N/A)  INSERTION-RIGHT VENTRICULAR ASSIST DEVICE (Right)  APPLICATION, WOUND VAC (N/A)  IRRIGATION, MEDIASTINUM    Post-Operative Day: 2 Days Post-Op    Objective:     Vital Signs (Most Recent):  Temp: (!) 100.58 °F (38.1 °C) (07/02/22 1130)  Pulse: (!) 113 (07/02/22 1130)  Resp: (!) 56 (07/02/22 1130)  BP: (!) 92/0 (07/02/22 0800)  SpO2: 97 % (07/02/22 1130)   Vital Signs (24h Range):  Temp:  [100.04 °F (37.8 °C)-102.2 °F (39 °C)] 100.58 °F (38.1 °C)  Pulse:  [109-121] 113  Resp:  [15-58] 56  SpO2:  [92 %-100 %] 97 %  BP: (92)/(0) 92/0  Arterial Line BP: ()/(64-85) 89/75     Weight: 93.7 kg (206 lb 9.1 oz)  Body mass index is 25.82 kg/m².      Intake/Output Summary (Last 24 hours) at 7/2/2022 1154  Last data filed at 7/2/2022 1119  Gross per 24 hour   Intake 3312.46 ml   Output 4880 ml   Net -1567.54 ml       Physical Exam  Vitals reviewed.   Constitutional:       General: He is not in acute distress.  HENT:      Head: Normocephalic and atraumatic.      Nose: Nose normal.      Mouth/Throat:      Mouth: Mucous membranes are moist.   Eyes:      Pupils: Pupils are equal, round, and reactive to light.   Neck:      Comments: RVAD cannulation to [facility] [place]    Cardiovascular:      Rate and Rhythm: Regular rhythm. Tachycardia present.      Pulses: Normal pulses.      Comments: S/p chest closure. Incision c/d/I.  Chest tubes in place with minimal sanguinous output      Pulmonary:      Effort: Pulmonary effort is normal. No respiratory distress.      Comments: Intubated, mechanically ventilated  Abdominal:      General: Abdomen is flat. There is no distension.      Palpations: Abdomen is soft.   Genitourinary:     Comments: melendez  Musculoskeletal:      Comments:  IABP site at left fem with dressing in place   Skin:     General: Skin is warm and dry.      Capillary Refill: Capillary refill takes less than 2 seconds.   Neurological:      General: No focal deficit present.      Mental Status: He is alert and oriented to person, place, and time.       Vents:  Vent Mode: Spont (07/01/22 1205)  Ventilator Initiated: Yes (06/29/22 1527)  Set Rate: 18 BPM (07/01/22 0737)  Vt Set: 580 mL (07/01/22 0737)  Pressure Support: 8 cmH20 (07/01/22 1205)  PEEP/CPAP: 5 cmH20 (07/01/22 1205)  Oxygen Concentration (%): 50 (07/01/22 1205)  Peak Airway Pressure: 15 cmH2O (07/01/22 1205)  Plateau Pressure: 21 cmH20 (07/01/22 1205)  Total Ve: 17 mL (07/01/22 1205)  Negative Inspiratory Force (cm H2O): -13 (07/01/22 1205)  F/VT Ratio<105 (RSBI): (!) 70.48 (07/01/22 1205)    Lines/Drains/Airways       Central Venous Catheter Line  Duration              Introducer with Double Lumen 06/29/22 1123 3 days    Percutaneous Central Line Insertion/Assessment - Triple Lumen  06/29/22 1200 left internal jugular 2 days              Drain  Duration                  Urethral Catheter 06/29/22 0840 Straight-tip;Temperature probe;Non-latex 14 Fr. 3 days         Chest Tube 06/29/22 1425 1 Left Pleural 32 Fr. 2 days         Chest Tube 06/29/22 1425 2 Right Pleural 32 Fr. 2 days              Arterial Line  Duration             Arterial Line 06/29/22 0832 Left Brachial 3 days              Line  Duration                  VAD 06/29/22 1115 Left ventricular assist device HeartMate 3 3 days              Peripheral Intravenous Line  Duration                  Peripheral IV - Single Lumen 06/29/22 1330 18 G Right Antecubital 2 days         Peripheral IV - Single Lumen 06/29/22 2200 20 G Left;Posterior Hand 2 days                    Significant Labs:    CBC/Anemia Profile:  Recent Labs   Lab 07/02/22  0000 07/02/22  0306 07/02/22  0309 07/02/22  0805   WBC 26.68*  --  26.58* 24.99*   HGB 8.5*  --  8.5* 8.4*   HCT 25.3* 27*  25.7* 25.5*     --  186 195   MCV 86  --  88 88   RDW 15.1*  --  15.2* 15.3*        Chemistries:  Recent Labs   Lab 07/02/22  0000 07/02/22  0309 07/02/22  0805    137 137   K 4.0 3.7 3.5    101 99   CO2 21* 21* 23   BUN 63* 64* 67*   CREATININE 2.1* 2.1* 2.1*   CALCIUM 9.1 9.2 9.0   ALBUMIN 3.5 3.5 3.6   PROT 6.9 6.9 7.1   BILITOT 2.5* 2.4* 2.3*   ALKPHOS 90 87 88   * 853* 948*   AST 1,700* 1,715* 1,507*   MG 2.8* 2.8* 2.9*   PHOS 5.0* 4.9* 4.8*       ABGs:   Recent Labs   Lab 07/02/22  0306   PH 7.428   PCO2 35.6   HCO3 23.5*   POCSATURATED 98   BE -1     Lactic Acid:   Recent Labs   Lab 07/01/22  1807   LACTATE 2.2     All pertinent labs within the past 24 hours have been reviewed.    Significant Imaging:  I have reviewed all pertinent imaging results/findings within the past 24 hours.

## 2022-07-02 NOTE — ASSESSMENT & PLAN NOTE
-Blood cultures 6/20 and repeat 6/21 positive for Staph Epi. One of two blood cultures from 6/23 positive for Staph (taken prior to line exchange). Repeat cultures sent 6/25 NGTD.   -IJ exchanged on 6/23, IABP exchanged 6/23  -Appreciate ID's help again:   Currently on IV Dapto per ID recs

## 2022-07-02 NOTE — PROGRESS NOTES
07/02/2022  Casey Arizmendi    Current provider:  Luis F Paige MD    Device interrogation:  TXP LVAD INTERROGATIONS 7/2/2022 7/2/2022 7/2/2022 7/2/2022 7/2/2022 7/2/2022 7/2/2022   Type HeartMate3 HeartMate3 HeartMate3 HeartMate3 HeartMate3 HeartMate3 HeartMate3   Flow 4.3 4.3 4.2 4.3 4.4 4.4 4.2   Speed 5200 5200 5200 5200 5200 5200 5200   PI 3.6 3.6 3.8 3.6 3.2 3.2 4.1   Power (Serna) 3.8 3.8 3.7 3.7 3.7 3.6 3.8   LSL 4800 4800 4800 4800 4800 4800 4800   Pulsatility - - - - - - -          Rounded on Kevan Queen to ensure all mechanical assist device settings (IABP or VAD) were appropriate and all parameters were within limits.  I was able to ensure all back up equipment was present, the staff had no issues, and the Perfusion Department daily rounding was complete.      For implantable VADs: Interrogation of Ventricular assist device was performed with analysis of device parameters and review of device function. I have personally reviewed the interrogation findings and agree with findings as stated.     In emergency, the nursing units have been notified to contact the perfusion department either by:  Calling [phone] from 630am to 4pm Mon thru Fri, utilizing the On-Call Finder functionality of Epic and searching for Perfusion, or by contacting the hospital  from 4pm to 630am and on weekends and asking to speak with the perfusionist on call.    5:00 PM

## 2022-07-02 NOTE — PT/OT/SLP EVAL
Occupational Therapy   Co-Evaluation    Name: Kevan Queen  MRN: [id number]  Admitting Diagnosis:  Acute on chronic combined systolic and diastolic congestive heart failure, NYHA class 4  Recent Surgery: Procedure(s) (LRB):  CLOSURE, WOUND, STERNUM (N/A)  INSERTION-RIGHT VENTRICULAR ASSIST DEVICE (Right)  APPLICATION, WOUND VAC (N/A)  IRRIGATION, MEDIASTINUM 2 Days Post-Op    Recommendations:     Discharge Recommendations: rehabilitation facility  Discharge Equipment Recommendations:   (TBD)  Barriers to discharge:  None    Assessment:     Kevan Queen is a 37 y.o. male with a medical diagnosis of Acute on chronic combined systolic and diastolic congestive heart failure, NYHA class 4.  He presents with LVAD.  Was able to perform supine/sit T/F c max A x2 and sit/stand and take steps to HOB c max A x2-total assist.  Pt was lethargic throughout assessment and anxious about moving d/t potential pain.  Has noted weakness in L UE including L biceps and has a noted frozen shoulder at baseline.  Pt has fair STM at this time and was Ox3.  Educated pt on sternal precautions. Performance deficits affecting function: weakness, impaired endurance, impaired self care skills, impaired functional mobilty, impaired balance, decreased upper extremity function, decreased ROM, impaired coordination, abnormal tone, impaired fine motor.      Rehab Prognosis: Good; patient would benefit from acute skilled OT services to address these deficits and reach maximum level of function.       Plan:     Patient to be seen 6 x/week to address the above listed problems via self-care/home management, therapeutic activities, therapeutic exercises  · Plan of Care Expires: 08/02/22  · Plan of Care Reviewed with: patient, spouse   · Co-tx d/t pt medical complexity, decreased endurance, and to insure pt safety.    Subjective     Chief Complaint: LVAD  Patient/Family Comments/goals: I want to hold onto my cup    Occupational Profile:  Living Environment:  Pt lives in a 2 story house c no MILY and has 15 MILY on the inside of house.  Has a tub/shower combo.  Previous level of function: I PTA  Equipment Used at Home:  none  Assistance upon Discharge: Pt lives c his wife    Pain/Comfort:  · Pain Rating 1: 10/10    Patients cultural, spiritual, Yazidism conflicts given the current situation: no    Objective:     Communicated with: RN prior to session.  Patient found supine with central line, chest tube, melendez catheter, arterial line, blood pressure cuff, LVAD, oxygen, peripheral IV, pulse ox (continuous), telemetry, wound vac (Nitric) upon OT entry to room.    General Precautions: Standard, fall, LVAD, sternal   Orthopedic Precautions:N/A   Braces: N/A  Respiratory Status: Nasal cannula, flow 2 L/min    Occupational Performance:    Bed Mobility:    · Patient completed Supine to Sit with maximal assistance and 2 persons  · Patient completed Sit to Supine with maximal assistance and 2 persons    Functional Mobility/Transfers:  · Patient completed Sit <> Stand Transfer with maximal assistance and of 2 persons  with  no assistive device   · Functional Mobility: Pt was able to take 2-3 steps to HOB c total assist.  Has poor static/dynamic standing balance at this time.    Activities of Daily Living:  · Grooming: stand by assistance to wash off face while sitting EOB.  · Upper Body Dressing: total assistance to don LVAD controller using ICU pouch.    Cognitive/Visual Perceptual:  Cognitive/Psychosocial Skills:     -       Oriented to: Person, Place, Time and Situation   -       Follows Commands/attention:Easily distracted and Follows two-step commands  -       Communication: clear/fluent  -       Memory: Impaired STM  -       Safety awareness/insight to disability: intact   -       Mood/Affect/Coping skills/emotional control: Appropriate to situation    Physical Exam:  Upper Extremity Range of Motion:     -       Right Upper Extremity: WFL  -       Left Upper Extremity: Has  100* of AROM elbow flexion c gravity eliminated and was able to form a composite fist.  Upper Extremity Strength:    -       Right Upper Extremity: WFL  -       Left Upper Extremity: 2/5 throughout and has 3-/5  strength.  Pt's sensation is intact.    AMPAC 6 Click ADL:  AMPAC Total Score: 12  Education:    Patient left supine with all lines intact, call button in reach, RN notified and wife present    GOALS:   Multidisciplinary Problems     Occupational Therapy Goals        Problem: Occupational Therapy    Goal Priority Disciplines Outcome Interventions   Occupational Therapy Goal     OT, PT/OT Ongoing, Progressing    Description: Goals to be met by: 8/2/22     Patient will increase functional independence with ADLs by performing:    UE Dressing with Modified [place].  LE Dressing with Modified [place] and Assistive Devices as needed.  Grooming while standing at sink with Modified [place].  Toileting from toilet with Modified [place] for hygiene and clothing management.   Bathing from  shower chair/bench with Modified [place].  Toilet transfer to toilet with Modified [place].  Increased functional strength to WFL for B UE.  Upper extremity exercise program x15 reps per handout, with assistance as needed.  Pt will be I in all LVAD management.                     History:     Past Medical History:   Diagnosis Date    Arthritis     Cardiomyopathy     CHF (congestive heart failure) 10/01/2020    Diabetes mellitus     Dilated cardiomyopathy 10/26/2020    Drug abuse 10/2020    Hyperosmolar hyperglycemic state (HHS) 5/25/2022    ICD (implantable cardioverter-defibrillator) in place 10/26/2020    Muscle cramping 6/15/2022    Renal disorder        Past Surgical History:   Procedure Laterality Date    APPLICATION OF WOUND VACUUM-ASSISTED CLOSURE DEVICE N/A 6/30/2022    Procedure: APPLICATION, WOUND VAC;  Surgeon: Luis F Paige MD;  Location: [facility] OR [address] [place] [address];  Service:  Cardiovascular;  Laterality: N/A;  50 x 5 cm    CARDIAC DEFIBRILLATOR PLACEMENT      IMPLANTATION OF RIGHT VENTRICULAR ASSIST DEVICE (RVAD) N/A 6/29/2022    Procedure: INSERTION, RVAD;  Surgeon: Luis F Paige MD;  [facility];  Service: Cardiovascular;  Laterality: N/A;    INSERTION OF GRAFT TO PERICARDIUM Right 6/30/2022    Procedure: INSERTION-RIGHT VENTRICULAR ASSIST DEVICE;  Surgeon: Luis F Paige MD;  [address]  Service: Cardiovascular;  Laterality: Right;    IRRIGATION OF MEDIASTINUM  6/30/2022    Procedure: IRRIGATION, MEDIASTINUM;  Surgeon: Luis F Paige MD;  [facility];  Service: Cardiovascular;;    LEFT VENTRICULAR ASSIST DEVICE Left 6/23/2022    Procedure: INSERTION-LEFT VENTRICULAR ASSIST DEVICE;  Surgeon: Luis F Paige MD;  [facility];  Service: Cardiovascular;  Laterality: Left;    LEFT VENTRICULAR ASSIST DEVICE N/A 6/29/2022    Procedure: INSERTION-LEFT VENTRICULAR ASSIST DEVICE;  Surgeon: Luis F Paige MD;  [facility];  Service: Cardiovascular;  Laterality: N/A;    RIGHT HEART CATHETERIZATION Right 4/8/2022    Procedure: INSERTION, CATHETER, RIGHT HEART;  Surgeon: Luca Lopez Jr., MD;  Location: [facility] CATH LAB;  Service: Cardiology;  Laterality: Right;    RIGHT HEART CATHETERIZATION Right 4/19/2022    Procedure: INSERTION, CATHETER, RIGHT HEART;  Surgeon: Josh Pulido MD;  Location: [facility] CATH LAB;  Service: Cardiology;  Laterality: Right;    STERNAL WOUND CLOSURE N/A 6/30/2022    Procedure: CLOSURE, WOUND, STERNUM;  Surgeon: Luis F Paige MD;  [facility];  Service: Cardiovascular;  Laterality: N/A;       Time Tracking:     OT Date of Treatment: 07/02/22  OT Start Time: 0808  OT Stop Time: 0846  OT Total Time (min): 38 min    Billable Minutes:Evaluation 8  Self Care/Home Management 15  Therapeutic Activity 15    7/2/2022

## 2022-07-03 LAB
A1AT SERPL-MCNC: 408 MG/DL (ref 100–190)
AFP SERPL-MCNC: 4.4 NG/ML (ref 0–8.4)
ALBUMIN SERPL BCP-MCNC: 3.2 G/DL (ref 3.5–5.2)
ALBUMIN SERPL BCP-MCNC: 3.3 G/DL (ref 3.5–5.2)
ALBUMIN SERPL BCP-MCNC: 3.3 G/DL (ref 3.5–5.2)
ALBUMIN SERPL BCP-MCNC: 3.4 G/DL (ref 3.5–5.2)
ALP SERPL-CCNC: 106 U/L (ref 55–135)
ALP SERPL-CCNC: 109 U/L (ref 55–135)
ALP SERPL-CCNC: 109 U/L (ref 55–135)
ALP SERPL-CCNC: 110 U/L (ref 55–135)
ALP SERPL-CCNC: 112 U/L (ref 55–135)
ALP SERPL-CCNC: 112 U/L (ref 55–135)
ALT SERPL W/O P-5'-P-CCNC: 722 U/L (ref 10–44)
ALT SERPL W/O P-5'-P-CCNC: 766 U/L (ref 10–44)
ALT SERPL W/O P-5'-P-CCNC: 819 U/L (ref 10–44)
ALT SERPL W/O P-5'-P-CCNC: 866 U/L (ref 10–44)
ALT SERPL W/O P-5'-P-CCNC: 881 U/L (ref 10–44)
ALT SERPL W/O P-5'-P-CCNC: 920 U/L (ref 10–44)
ANION GAP SERPL CALC-SCNC: 11 MMOL/L (ref 8–16)
ANION GAP SERPL CALC-SCNC: 12 MMOL/L (ref 8–16)
ANION GAP SERPL CALC-SCNC: 12 MMOL/L (ref 8–16)
ANION GAP SERPL CALC-SCNC: 13 MMOL/L (ref 8–16)
ANION GAP SERPL CALC-SCNC: 13 MMOL/L (ref 8–16)
ANION GAP SERPL CALC-SCNC: 15 MMOL/L (ref 8–16)
ANISOCYTOSIS BLD QL SMEAR: SLIGHT
APTT BLDCRRT: 31.8 SEC (ref 21–32)
APTT BLDCRRT: 34.5 SEC (ref 21–32)
APTT BLDCRRT: 36.8 SEC (ref 21–32)
AST SERPL-CCNC: 386 U/L (ref 10–40)
AST SERPL-CCNC: 459 U/L (ref 10–40)
AST SERPL-CCNC: 529 U/L (ref 10–40)
AST SERPL-CCNC: 632 U/L (ref 10–40)
AST SERPL-CCNC: 714 U/L (ref 10–40)
AST SERPL-CCNC: 859 U/L (ref 10–40)
BASO STIPL BLD QL SMEAR: ABNORMAL
BASOPHILS # BLD AUTO: 0.04 K/UL (ref 0–0.2)
BASOPHILS NFR BLD: 0.2 % (ref 0–1.9)
BILIRUB SERPL-MCNC: 1.5 MG/DL (ref 0.1–1)
BILIRUB SERPL-MCNC: 1.6 MG/DL (ref 0.1–1)
BILIRUB SERPL-MCNC: 1.7 MG/DL (ref 0.1–1)
BILIRUB SERPL-MCNC: 1.8 MG/DL (ref 0.1–1)
BUN SERPL-MCNC: 50 MG/DL (ref 6–20)
BUN SERPL-MCNC: 52 MG/DL (ref 6–20)
BUN SERPL-MCNC: 53 MG/DL (ref 6–20)
BUN SERPL-MCNC: 55 MG/DL (ref 6–20)
BUN SERPL-MCNC: 56 MG/DL (ref 6–20)
BUN SERPL-MCNC: 56 MG/DL (ref 6–20)
CALCIUM SERPL-MCNC: 8.6 MG/DL (ref 8.7–10.5)
CALCIUM SERPL-MCNC: 8.8 MG/DL (ref 8.7–10.5)
CALCIUM SERPL-MCNC: 9 MG/DL (ref 8.7–10.5)
CALCIUM SERPL-MCNC: 9.4 MG/DL (ref 8.7–10.5)
CERULOPLASMIN SERPL-MCNC: 64 MG/DL (ref 15–45)
CHLORIDE SERPL-SCNC: 102 MMOL/L (ref 95–110)
CHLORIDE SERPL-SCNC: 103 MMOL/L (ref 95–110)
CHLORIDE SERPL-SCNC: 104 MMOL/L (ref 95–110)
CHLORIDE SERPL-SCNC: 98 MMOL/L (ref 95–110)
CO2 SERPL-SCNC: 23 MMOL/L (ref 23–29)
CO2 SERPL-SCNC: 24 MMOL/L (ref 23–29)
CREAT SERPL-MCNC: 1.4 MG/DL (ref 0.5–1.4)
CREAT SERPL-MCNC: 1.4 MG/DL (ref 0.5–1.4)
CREAT SERPL-MCNC: 1.5 MG/DL (ref 0.5–1.4)
CREAT SERPL-MCNC: 1.6 MG/DL (ref 0.5–1.4)
DIFFERENTIAL METHOD: ABNORMAL
EOSINOPHIL # BLD AUTO: 0 K/UL (ref 0–0.5)
EOSINOPHIL NFR BLD: 0 % (ref 0–8)
ERYTHROCYTE [DISTWIDTH] IN BLOOD BY AUTOMATED COUNT: 15.2 % (ref 11.5–14.5)
EST. GFR  (AFRICAN AMERICAN): >60 ML/MIN/1.73 M^2
EST. GFR  (NON AFRICAN AMERICAN): 54.2 ML/MIN/1.73 M^2
EST. GFR  (NON AFRICAN AMERICAN): 58.6 ML/MIN/1.73 M^2
EST. GFR  (NON AFRICAN AMERICAN): >60 ML/MIN/1.73 M^2
EST. GFR  (NON AFRICAN AMERICAN): >60 ML/MIN/1.73 M^2
FERRITIN SERPL-MCNC: 265 NG/ML (ref 20–300)
FIBRINOGEN PPP-MCNC: >800 MG/DL (ref 182–400)
GLUCOSE SERPL-MCNC: 182 MG/DL (ref 70–110)
GLUCOSE SERPL-MCNC: 189 MG/DL (ref 70–110)
GLUCOSE SERPL-MCNC: 203 MG/DL (ref 70–110)
GLUCOSE SERPL-MCNC: 238 MG/DL (ref 70–110)
GLUCOSE SERPL-MCNC: 259 MG/DL (ref 70–110)
GLUCOSE SERPL-MCNC: 310 MG/DL (ref 70–110)
HCT VFR BLD AUTO: 24.4 % (ref 40–54)
HGB BLD-MCNC: 8.1 G/DL (ref 14–18)
IMM GRANULOCYTES # BLD AUTO: 0.15 K/UL (ref 0–0.04)
IMM GRANULOCYTES NFR BLD AUTO: 0.6 % (ref 0–0.5)
INR PPP: 1.1 (ref 0.8–1.2)
IRON SERPL-MCNC: 16 UG/DL (ref 45–160)
LDH SERPL L TO P-CCNC: 1464 U/L (ref 110–260)
LYMPHOCYTES # BLD AUTO: 1.2 K/UL (ref 1–4.8)
LYMPHOCYTES NFR BLD: 4.9 % (ref 18–48)
MAGNESIUM SERPL-MCNC: 2.5 MG/DL (ref 1.6–2.6)
MAGNESIUM SERPL-MCNC: 2.6 MG/DL (ref 1.6–2.6)
MAGNESIUM SERPL-MCNC: 2.7 MG/DL (ref 1.6–2.6)
MCH RBC QN AUTO: 28.3 PG (ref 27–31)
MCHC RBC AUTO-ENTMCNC: 33.2 G/DL (ref 32–36)
MCV RBC AUTO: 85 FL (ref 82–98)
METHEMOGLOBIN: 0.9 % (ref 0–3)
MONOCYTES # BLD AUTO: 1.3 K/UL (ref 0.3–1)
MONOCYTES NFR BLD: 5.2 % (ref 4–15)
NEUTROPHILS # BLD AUTO: 21.7 K/UL (ref 1.8–7.7)
NEUTROPHILS NFR BLD: 89.1 % (ref 38–73)
NRBC BLD-RTO: 3 /100 WBC
PHOSPHATE SERPL-MCNC: 1.9 MG/DL (ref 2.7–4.5)
PHOSPHATE SERPL-MCNC: 2.1 MG/DL (ref 2.7–4.5)
PHOSPHATE SERPL-MCNC: 2.8 MG/DL (ref 2.7–4.5)
PLATELET # BLD AUTO: 200 K/UL (ref 150–450)
PLATELET BLD QL SMEAR: ABNORMAL
PMV BLD AUTO: 10.5 FL (ref 9.2–12.9)
POCT GLUCOSE: 196 MG/DL (ref 70–110)
POCT GLUCOSE: 209 MG/DL (ref 70–110)
POCT GLUCOSE: 213 MG/DL (ref 70–110)
POCT GLUCOSE: 238 MG/DL (ref 70–110)
POCT GLUCOSE: 243 MG/DL (ref 70–110)
POLYCHROMASIA BLD QL SMEAR: ABNORMAL
POTASSIUM SERPL-SCNC: 3.1 MMOL/L (ref 3.5–5.1)
POTASSIUM SERPL-SCNC: 3.3 MMOL/L (ref 3.5–5.1)
POTASSIUM SERPL-SCNC: 3.6 MMOL/L (ref 3.5–5.1)
POTASSIUM SERPL-SCNC: 3.8 MMOL/L (ref 3.5–5.1)
POTASSIUM SERPL-SCNC: 3.9 MMOL/L (ref 3.5–5.1)
POTASSIUM SERPL-SCNC: 4 MMOL/L (ref 3.5–5.1)
PROT SERPL-MCNC: 6.8 G/DL (ref 6–8.4)
PROT SERPL-MCNC: 6.8 G/DL (ref 6–8.4)
PROT SERPL-MCNC: 6.9 G/DL (ref 6–8.4)
PROT SERPL-MCNC: 7.1 G/DL (ref 6–8.4)
PROT SERPL-MCNC: 7.3 G/DL (ref 6–8.4)
PROT SERPL-MCNC: 7.7 G/DL (ref 6–8.4)
PROTHROMBIN TIME: 11 SEC (ref 9–12.5)
RBC # BLD AUTO: 2.86 M/UL (ref 4.6–6.2)
SATURATED IRON: 4 % (ref 20–50)
SODIUM SERPL-SCNC: 137 MMOL/L (ref 136–145)
SODIUM SERPL-SCNC: 138 MMOL/L (ref 136–145)
SODIUM SERPL-SCNC: 138 MMOL/L (ref 136–145)
SODIUM SERPL-SCNC: 139 MMOL/L (ref 136–145)
SODIUM SERPL-SCNC: 140 MMOL/L (ref 136–145)
SODIUM SERPL-SCNC: 140 MMOL/L (ref 136–145)
TOTAL IRON BINDING CAPACITY: 371 UG/DL (ref 250–450)
TRANSFERRIN SERPL-MCNC: 251 MG/DL (ref 200–375)
WBC # BLD AUTO: 24.35 K/UL (ref 3.9–12.7)

## 2022-07-03 PROCEDURE — 82390 ASSAY OF CERULOPLASMIN: CPT | Performed by: STUDENT IN AN ORGANIZED HEALTH CARE EDUCATION/TRAINING PROGRAM

## 2022-07-03 PROCEDURE — 84100 ASSAY OF PHOSPHORUS: CPT | Performed by: STUDENT IN AN ORGANIZED HEALTH CARE EDUCATION/TRAINING PROGRAM

## 2022-07-03 PROCEDURE — 85025 COMPLETE CBC W/AUTO DIFF WBC: CPT | Performed by: STUDENT IN AN ORGANIZED HEALTH CARE EDUCATION/TRAINING PROGRAM

## 2022-07-03 PROCEDURE — 85610 PROTHROMBIN TIME: CPT | Performed by: STUDENT IN AN ORGANIZED HEALTH CARE EDUCATION/TRAINING PROGRAM

## 2022-07-03 PROCEDURE — 63600175 PHARM REV CODE 636 W HCPCS: Performed by: THORACIC SURGERY (CARDIOTHORACIC VASCULAR SURGERY)

## 2022-07-03 PROCEDURE — 83735 ASSAY OF MAGNESIUM: CPT | Mod: 91 | Performed by: STUDENT IN AN ORGANIZED HEALTH CARE EDUCATION/TRAINING PROGRAM

## 2022-07-03 PROCEDURE — 99900035 HC TECH TIME PER 15 MIN (STAT)

## 2022-07-03 PROCEDURE — 63600175 PHARM REV CODE 636 W HCPCS: Performed by: NURSE PRACTITIONER

## 2022-07-03 PROCEDURE — 82728 ASSAY OF FERRITIN: CPT | Performed by: STUDENT IN AN ORGANIZED HEALTH CARE EDUCATION/TRAINING PROGRAM

## 2022-07-03 PROCEDURE — 27000248 HC VAD-ADDITIONAL DAY

## 2022-07-03 PROCEDURE — 25000003 PHARM REV CODE 250: Performed by: STUDENT IN AN ORGANIZED HEALTH CARE EDUCATION/TRAINING PROGRAM

## 2022-07-03 PROCEDURE — 85730 THROMBOPLASTIN TIME PARTIAL: CPT | Mod: 91 | Performed by: THORACIC SURGERY (CARDIOTHORACIC VASCULAR SURGERY)

## 2022-07-03 PROCEDURE — 85730 THROMBOPLASTIN TIME PARTIAL: CPT | Performed by: STUDENT IN AN ORGANIZED HEALTH CARE EDUCATION/TRAINING PROGRAM

## 2022-07-03 PROCEDURE — 63600175 PHARM REV CODE 636 W HCPCS: Performed by: INTERNAL MEDICINE

## 2022-07-03 PROCEDURE — 20000000 HC ICU ROOM

## 2022-07-03 PROCEDURE — 99232 SBSQ HOSP IP/OBS MODERATE 35: CPT | Mod: ,,, | Performed by: NURSE PRACTITIONER

## 2022-07-03 PROCEDURE — 27000221 HC OXYGEN, UP TO 24 HOURS

## 2022-07-03 PROCEDURE — 99291 CRITICAL CARE FIRST HOUR: CPT | Mod: ,,, | Performed by: ANESTHESIOLOGY

## 2022-07-03 PROCEDURE — 86256 FLUORESCENT ANTIBODY TITER: CPT | Mod: 91 | Performed by: STUDENT IN AN ORGANIZED HEALTH CARE EDUCATION/TRAINING PROGRAM

## 2022-07-03 PROCEDURE — 83615 LACTATE (LD) (LDH) ENZYME: CPT | Performed by: THORACIC SURGERY (CARDIOTHORACIC VASCULAR SURGERY)

## 2022-07-03 PROCEDURE — 25000003 PHARM REV CODE 250

## 2022-07-03 PROCEDURE — 82105 ALPHA-FETOPROTEIN SERUM: CPT | Performed by: STUDENT IN AN ORGANIZED HEALTH CARE EDUCATION/TRAINING PROGRAM

## 2022-07-03 PROCEDURE — 25000003 PHARM REV CODE 250: Performed by: INTERNAL MEDICINE

## 2022-07-03 PROCEDURE — 63600175 PHARM REV CODE 636 W HCPCS: Performed by: STUDENT IN AN ORGANIZED HEALTH CARE EDUCATION/TRAINING PROGRAM

## 2022-07-03 PROCEDURE — 84466 ASSAY OF TRANSFERRIN: CPT | Performed by: STUDENT IN AN ORGANIZED HEALTH CARE EDUCATION/TRAINING PROGRAM

## 2022-07-03 PROCEDURE — 99232 PR SUBSEQUENT HOSPITAL CARE,LEVL II: ICD-10-PCS | Mod: ,,, | Performed by: NURSE PRACTITIONER

## 2022-07-03 PROCEDURE — 84100 ASSAY OF PHOSPHORUS: CPT | Mod: 91 | Performed by: STUDENT IN AN ORGANIZED HEALTH CARE EDUCATION/TRAINING PROGRAM

## 2022-07-03 PROCEDURE — 87389 HIV-1 AG W/HIV-1&-2 AB AG IA: CPT | Performed by: STUDENT IN AN ORGANIZED HEALTH CARE EDUCATION/TRAINING PROGRAM

## 2022-07-03 PROCEDURE — 82103 ALPHA-1-ANTITRYPSIN TOTAL: CPT | Performed by: STUDENT IN AN ORGANIZED HEALTH CARE EDUCATION/TRAINING PROGRAM

## 2022-07-03 PROCEDURE — 83050 HGB METHEMOGLOBIN QUAN: CPT

## 2022-07-03 PROCEDURE — 94761 N-INVAS EAR/PLS OXIMETRY MLT: CPT

## 2022-07-03 PROCEDURE — 83735 ASSAY OF MAGNESIUM: CPT | Performed by: STUDENT IN AN ORGANIZED HEALTH CARE EDUCATION/TRAINING PROGRAM

## 2022-07-03 PROCEDURE — 63600367 HC NITRIC OXIDE PER HOUR

## 2022-07-03 PROCEDURE — 80053 COMPREHEN METABOLIC PANEL: CPT | Performed by: THORACIC SURGERY (CARDIOTHORACIC VASCULAR SURGERY)

## 2022-07-03 PROCEDURE — 99233 PR SUBSEQUENT HOSPITAL CARE,LEVL III: ICD-10-PCS | Mod: ,,, | Performed by: INTERNAL MEDICINE

## 2022-07-03 PROCEDURE — 80053 COMPREHEN METABOLIC PANEL: CPT | Mod: 91 | Performed by: THORACIC SURGERY (CARDIOTHORACIC VASCULAR SURGERY)

## 2022-07-03 PROCEDURE — 86038 ANTINUCLEAR ANTIBODIES: CPT | Performed by: STUDENT IN AN ORGANIZED HEALTH CARE EDUCATION/TRAINING PROGRAM

## 2022-07-03 PROCEDURE — 27100171 HC OXYGEN HIGH FLOW UP TO 24 HOURS

## 2022-07-03 PROCEDURE — 36415 COLL VENOUS BLD VENIPUNCTURE: CPT | Performed by: STUDENT IN AN ORGANIZED HEALTH CARE EDUCATION/TRAINING PROGRAM

## 2022-07-03 PROCEDURE — 37799 UNLISTED PX VASCULAR SURGERY: CPT

## 2022-07-03 PROCEDURE — 99233 SBSQ HOSP IP/OBS HIGH 50: CPT | Mod: ,,, | Performed by: INTERNAL MEDICINE

## 2022-07-03 PROCEDURE — 99291 PR CRITICAL CARE, E/M 30-74 MINUTES: ICD-10-PCS | Mod: ,,, | Performed by: ANESTHESIOLOGY

## 2022-07-03 PROCEDURE — 25000003 PHARM REV CODE 250: Performed by: THORACIC SURGERY (CARDIOTHORACIC VASCULAR SURGERY)

## 2022-07-03 PROCEDURE — 93750 INTERROGATION VAD IN PERSON: CPT | Mod: ,,, | Performed by: INTERNAL MEDICINE

## 2022-07-03 PROCEDURE — 93750 PR INTERROGATE VENT ASSIST DEV, IN PERSON, W PHYSICIAN ANALYSIS: ICD-10-PCS | Mod: ,,, | Performed by: INTERNAL MEDICINE

## 2022-07-03 PROCEDURE — 85730 THROMBOPLASTIN TIME PARTIAL: CPT | Mod: 91 | Performed by: STUDENT IN AN ORGANIZED HEALTH CARE EDUCATION/TRAINING PROGRAM

## 2022-07-03 PROCEDURE — 86235 NUCLEAR ANTIGEN ANTIBODY: CPT | Performed by: STUDENT IN AN ORGANIZED HEALTH CARE EDUCATION/TRAINING PROGRAM

## 2022-07-03 PROCEDURE — 80074 ACUTE HEPATITIS PANEL: CPT | Performed by: STUDENT IN AN ORGANIZED HEALTH CARE EDUCATION/TRAINING PROGRAM

## 2022-07-03 PROCEDURE — 85384 FIBRINOGEN ACTIVITY: CPT | Performed by: STUDENT IN AN ORGANIZED HEALTH CARE EDUCATION/TRAINING PROGRAM

## 2022-07-03 RX ORDER — HYDRALAZINE HYDROCHLORIDE 20 MG/ML
10 INJECTION INTRAMUSCULAR; INTRAVENOUS EVERY 6 HOURS PRN
Status: DISCONTINUED | OUTPATIENT
Start: 2022-07-03 | End: 2022-07-12

## 2022-07-03 RX ORDER — FUROSEMIDE 10 MG/ML
20 INJECTION INTRAMUSCULAR; INTRAVENOUS ONCE
Status: COMPLETED | OUTPATIENT
Start: 2022-07-03 | End: 2022-07-03

## 2022-07-03 RX ORDER — INSULIN ASPART 100 [IU]/ML
5-8 INJECTION, SOLUTION INTRAVENOUS; SUBCUTANEOUS
Status: DISCONTINUED | OUTPATIENT
Start: 2022-07-03 | End: 2022-07-04

## 2022-07-03 RX ORDER — AMLODIPINE BESYLATE 5 MG/1
5 TABLET ORAL DAILY
Status: DISCONTINUED | OUTPATIENT
Start: 2022-07-03 | End: 2022-07-03

## 2022-07-03 RX ORDER — POTASSIUM CHLORIDE 29.8 MG/ML
40 INJECTION INTRAVENOUS
Status: COMPLETED | OUTPATIENT
Start: 2022-07-03 | End: 2022-07-03

## 2022-07-03 RX ORDER — WARFARIN 4 MG/1
4 TABLET ORAL ONCE
Status: COMPLETED | OUTPATIENT
Start: 2022-07-03 | End: 2022-07-03

## 2022-07-03 RX ORDER — POTASSIUM CHLORIDE 29.8 MG/ML
80 INJECTION INTRAVENOUS ONCE
Status: COMPLETED | OUTPATIENT
Start: 2022-07-03 | End: 2022-07-03

## 2022-07-03 RX ORDER — AMLODIPINE BESYLATE 5 MG/1
5 TABLET ORAL ONCE
Status: COMPLETED | OUTPATIENT
Start: 2022-07-03 | End: 2022-07-03

## 2022-07-03 RX ORDER — AMLODIPINE BESYLATE 10 MG/1
10 TABLET ORAL DAILY
Status: DISCONTINUED | OUTPATIENT
Start: 2022-07-04 | End: 2022-07-11

## 2022-07-03 RX ADMIN — FUROSEMIDE 20 MG: 10 INJECTION, SOLUTION INTRAMUSCULAR; INTRAVENOUS at 08:07

## 2022-07-03 RX ADMIN — POTASSIUM CHLORIDE 80 MEQ: 29.8 INJECTION, SOLUTION INTRAVENOUS at 09:07

## 2022-07-03 RX ADMIN — FENTANYL CITRATE 25 MCG: 50 INJECTION INTRAMUSCULAR; INTRAVENOUS at 09:07

## 2022-07-03 RX ADMIN — FUROSEMIDE 20 MG: 10 INJECTION, SOLUTION INTRAMUSCULAR; INTRAVENOUS at 02:07

## 2022-07-03 RX ADMIN — OXYCODONE 5 MG: 5 TABLET ORAL at 11:07

## 2022-07-03 RX ADMIN — NICARDIPINE HYDROCHLORIDE 10 MG/HR: 0.2 INJECTION, SOLUTION INTRAVENOUS at 02:07

## 2022-07-03 RX ADMIN — INSULIN ASPART 2 UNITS: 100 INJECTION, SOLUTION INTRAVENOUS; SUBCUTANEOUS at 09:07

## 2022-07-03 RX ADMIN — WARFARIN SODIUM 4 MG: 4 TABLET ORAL at 05:07

## 2022-07-03 RX ADMIN — NICARDIPINE HYDROCHLORIDE 7.5 MG/HR: 0.2 INJECTION, SOLUTION INTRAVENOUS at 08:07

## 2022-07-03 RX ADMIN — POLYETHYLENE GLYCOL 3350 17 G: 17 POWDER, FOR SOLUTION ORAL at 08:07

## 2022-07-03 RX ADMIN — INSULIN ASPART 4 UNITS: 100 INJECTION, SOLUTION INTRAVENOUS; SUBCUTANEOUS at 06:07

## 2022-07-03 RX ADMIN — CHLOROTHIAZIDE SODIUM 500 MG: 500 INJECTION, POWDER, LYOPHILIZED, FOR SOLUTION INTRAVENOUS at 03:07

## 2022-07-03 RX ADMIN — Medication 324 MG: at 07:07

## 2022-07-03 RX ADMIN — EPINEPHRINE 0.06 MCG/KG/MIN: 1 INJECTION INTRAMUSCULAR; INTRAVENOUS; SUBCUTANEOUS at 02:07

## 2022-07-03 RX ADMIN — POTASSIUM BICARBONATE 40 MEQ: 391 TABLET, EFFERVESCENT ORAL at 02:07

## 2022-07-03 RX ADMIN — ACETYLCYSTEINE 6.25 MG/KG/HR: 200 INJECTION, SOLUTION INTRAVENOUS at 03:07

## 2022-07-03 RX ADMIN — FUROSEMIDE 20 MG/HR: 10 INJECTION, SOLUTION INTRAMUSCULAR; INTRAVENOUS at 03:07

## 2022-07-03 RX ADMIN — INSULIN ASPART 5 UNITS: 100 INJECTION, SOLUTION INTRAVENOUS; SUBCUTANEOUS at 06:07

## 2022-07-03 RX ADMIN — POTASSIUM CHLORIDE 40 MEQ: 200 INJECTION, SOLUTION INTRAVENOUS at 10:07

## 2022-07-03 RX ADMIN — MUPIROCIN: 20 OINTMENT TOPICAL at 08:07

## 2022-07-03 RX ADMIN — INSULIN ASPART 2 UNITS: 100 INJECTION, SOLUTION INTRAVENOUS; SUBCUTANEOUS at 08:07

## 2022-07-03 RX ADMIN — DAPTOMYCIN 750 MG: 350 INJECTION, POWDER, LYOPHILIZED, FOR SOLUTION INTRAVENOUS at 02:07

## 2022-07-03 RX ADMIN — OXYCODONE 5 MG: 5 TABLET ORAL at 03:07

## 2022-07-03 RX ADMIN — FENTANYL CITRATE 25 MCG: 50 INJECTION INTRAMUSCULAR; INTRAVENOUS at 02:07

## 2022-07-03 RX ADMIN — FENTANYL CITRATE 25 MCG: 50 INJECTION INTRAMUSCULAR; INTRAVENOUS at 10:07

## 2022-07-03 RX ADMIN — OXYCODONE 5 MG: 5 TABLET ORAL at 01:07

## 2022-07-03 RX ADMIN — FUROSEMIDE 15 MG/HR: 10 INJECTION, SOLUTION INTRAMUSCULAR; INTRAVENOUS at 04:07

## 2022-07-03 RX ADMIN — BISACODYL 10 MG: 10 SUPPOSITORY RECTAL at 03:07

## 2022-07-03 RX ADMIN — INSULIN ASPART 2 UNITS: 100 INJECTION, SOLUTION INTRAVENOUS; SUBCUTANEOUS at 02:07

## 2022-07-03 RX ADMIN — OXYCODONE 5 MG: 5 TABLET ORAL at 09:07

## 2022-07-03 RX ADMIN — POTASSIUM CHLORIDE 40 MEQ: 29.8 INJECTION, SOLUTION INTRAVENOUS at 04:07

## 2022-07-03 RX ADMIN — FAMOTIDINE 20 MG: 10 INJECTION, SOLUTION INTRAVENOUS at 08:07

## 2022-07-03 RX ADMIN — ACETYLCYSTEINE 6.25 MG/KG/HR: 200 INJECTION, SOLUTION INTRAVENOUS at 09:07

## 2022-07-03 RX ADMIN — AMLODIPINE BESYLATE 5 MG: 5 TABLET ORAL at 03:07

## 2022-07-03 RX ADMIN — NICARDIPINE HYDROCHLORIDE 2.5 MG/HR: 0.2 INJECTION, SOLUTION INTRAVENOUS at 03:07

## 2022-07-03 RX ADMIN — POTASSIUM BICARBONATE 20 MEQ: 391 TABLET, EFFERVESCENT ORAL at 02:07

## 2022-07-03 RX ADMIN — INSULIN ASPART 4 UNITS: 100 INJECTION, SOLUTION INTRAVENOUS; SUBCUTANEOUS at 01:07

## 2022-07-03 RX ADMIN — POTASSIUM BICARBONATE 20 MEQ: 391 TABLET, EFFERVESCENT ORAL at 05:07

## 2022-07-03 RX ADMIN — FENTANYL CITRATE 25 MCG: 50 INJECTION INTRAMUSCULAR; INTRAVENOUS at 01:07

## 2022-07-03 RX ADMIN — POTASSIUM BICARBONATE 40 MEQ: 391 TABLET, EFFERVESCENT ORAL at 09:07

## 2022-07-03 RX ADMIN — INSULIN HUMAN 4.8 UNITS/HR: 1 INJECTION, SOLUTION INTRAVENOUS at 05:07

## 2022-07-03 RX ADMIN — OXYCODONE 5 MG: 5 TABLET ORAL at 07:07

## 2022-07-03 RX ADMIN — NICARDIPINE HYDROCHLORIDE 10 MG/HR: 0.2 INJECTION, SOLUTION INTRAVENOUS at 10:07

## 2022-07-03 RX ADMIN — POTASSIUM CHLORIDE 40 MEQ: 29.8 INJECTION, SOLUTION INTRAVENOUS at 02:07

## 2022-07-03 RX ADMIN — AMLODIPINE BESYLATE 5 MG: 5 TABLET ORAL at 08:07

## 2022-07-03 RX ADMIN — FENTANYL CITRATE 25 MCG: 50 INJECTION INTRAMUSCULAR; INTRAVENOUS at 06:07

## 2022-07-03 NOTE — SUBJECTIVE & OBJECTIVE
Interval History/Significant Events: No acute events overnight. LVAD flows stable. Increased lasix drip to help with diuresis due to hypertension and increased CVP. Blood cultures drawn overnight due to fevers.     Follow-up For: Procedure(s) (LRB):  CLOSURE, WOUND, STERNUM (N/A)  INSERTION-RIGHT VENTRICULAR ASSIST DEVICE (Right)  APPLICATION, WOUND VAC (N/A)  IRRIGATION, MEDIASTINUM    Post-Operative Day: 3 Days Post-Op    Objective:     Vital Signs (Most Recent):  Temp: 98.6 °F (37 °C) (07/03/22 0900)  Pulse: 108 (07/03/22 0900)  Resp: 17 (07/03/22 0900)  BP: (!) 90/0 (07/03/22 0700)  SpO2: (!) 93 % (07/03/22 0900)   Vital Signs (24h Range):  Temp:  [98.6 °F (37 °C)-101.3 °F (38.5 °C)] 98.6 °F (37 °C)  Pulse:  [105-122] 108  Resp:  [14-57] 17  SpO2:  [77 %-100 %] 93 %  BP: ()/(0) 90/0  Arterial Line BP: ()/(65-84) 98/83     Weight: 93.7 kg (206 lb 9.1 oz)  Body mass index is 25.82 kg/m².      Intake/Output Summary (Last 24 hours) at 7/3/2022 0914  Last data filed at 7/3/2022 0900  Gross per 24 hour   Intake 4328.64 ml   Output 7270 ml   Net -2941.36 ml       Physical Exam  Vitals reviewed.   Constitutional:       General: He is not in acute distress.  HENT:      Head: Normocephalic and atraumatic.      Nose: Nose normal.      Mouth/Throat:      Mouth: Mucous membranes are moist.   Eyes:      Pupils: Pupils are equal, round, and reactive to light.   Neck:      Comments:     Cardiovascular:      Rate and Rhythm: Regular rhythm. Tachycardia present.      Pulses: Normal pulses.      Comments: S/p chest closure. Incision c/d/I.  Chest tubes in place with minimal sanguinous output      Pulmonary:      Effort: Pulmonary effort is normal. No respiratory distress.      Comments: On 4L NC  Abdominal:      General: Abdomen is flat. There is no distension.      Palpations: Abdomen is soft.   Genitourinary:     Comments: melendez  Musculoskeletal:      Comments: IABP site at left fem with dressing in place   Skin:      General: Skin is warm and dry.      Capillary Refill: Capillary refill takes less than 2 seconds.   Neurological:      General: No focal deficit present.      Mental Status: He is alert and oriented to person, place, and time.       Vents:  Vent Mode: Spont (07/01/22 1205)  Ventilator Initiated: Yes (06/29/22 1527)  Set Rate: 18 BPM (07/01/22 0737)  Vt Set: 580 mL (07/01/22 0737)  Pressure Support: 8 cmH20 (07/01/22 1205)  PEEP/CPAP: 5 cmH20 (07/01/22 1205)  Oxygen Concentration (%): 36 (07/03/22 0404)  Peak Airway Pressure: 15 cmH2O (07/01/22 1205)  Plateau Pressure: 21 cmH20 (07/01/22 1205)  Total Ve: 17 mL (07/01/22 1205)  Negative Inspiratory Force (cm H2O): -13 (07/01/22 1205)  F/VT Ratio<105 (RSBI): (!) 70.48 (07/01/22 1205)    Lines/Drains/Airways       Central Venous Catheter Line  Duration              Introducer with Double Lumen 06/29/22 1123 3 days    Percutaneous Central Line Insertion/Assessment - Triple Lumen  06/29/22 1200 left internal jugular 3 days              Drain  Duration                  Urethral Catheter 06/29/22 0840 Straight-tip;Temperature probe;Non-latex 14 Fr. 4 days         Chest Tube 06/29/22 1425 2 Right Pleural 32 Fr. 3 days              Arterial Line  Duration             Arterial Line 06/29/22 0832 Left Brachial 4 days              Line  Duration                  VAD 06/29/22 1115 Left ventricular assist device HeartMate 3 3 days              Peripheral Intravenous Line  Duration                  Peripheral IV - Single Lumen 06/29/22 1330 18 G Right Antecubital 3 days         Peripheral IV - Single Lumen 07/02/22 2226 20 G Anterior;Distal;Left Forearm <1 day                    Significant Labs:    CBC/Anemia Profile:  Recent Labs   Lab 07/02/22  0805 07/02/22  1200 07/03/22  0352   WBC 24.99* 24.96* 24.35*   HGB 8.4* 8.5* 8.1*   HCT 25.5* 25.2* 24.4*    198 200   MCV 88 86 85   RDW 15.3* 15.1* 15.2*   IRON  --   --  16*   FERRITIN  --   --  265        Chemistries:  Recent  Labs   Lab 07/02/22 2010 07/03/22  0000 07/03/22  0352    138 140   K 3.5 3.3* 4.0    103 104   CO2 20* 23 23   BUN 62* 56* 55*   CREATININE 1.8* 1.6* 1.5*   CALCIUM 8.8 8.6* 9.4   ALBUMIN 3.3* 3.3* 3.2*   PROT 7.0 6.8 7.7   BILITOT 2.0* 1.8* 1.8*   ALKPHOS 111 110 112   * 920* 866*   AST 1,025* 859* 714*   MG 2.6 2.6 2.7*   PHOS 2.5* 1.9* 2.1*       ABGs:   Recent Labs   Lab 07/02/22  0306   PH 7.428   PCO2 35.6   HCO3 23.5*   POCSATURATED 98   BE -1     Lactic Acid:   Recent Labs   Lab 07/01/22  1807   LACTATE 2.2     All pertinent labs within the past 24 hours have been reviewed.    Significant Imaging:  I have reviewed all pertinent imaging results/findings within the past 24 hours.

## 2022-07-03 NOTE — ASSESSMENT & PLAN NOTE
Kevan Queen is a 37yoM with a history of polysubstance abuse who presented to the hospital with decompensated heart failure. He underwent LVAD, RVAD and MVr on 6/29/22. He is admitted to the ICU post-operatively.       Neuro/Psych:   -- Sedation: none  -- Pain: PRN oxy, fentanyl             Cards:   -- s/p LVAD, RVAD insertion and MVr. RVAD removed 7/1  -- HDS on epi 0.05, dobutamine 2.5 for inotropic support  -- s/p chest closure 6/30   -- MAP goal 75-85  -- Cardene for hypertension, continue norvasc, wean cardene as able      Pulm:   -- Goal O2 sat > 90%  -- ABG PRN  -- Extubated 7/1  -- weaned to minimal ventilatory support  -- iNO2 @ 20ppm  -- daily CXR      Renal:  -- Keep melendez for strict I/O  -- Trend BUN/Cr   -- 2L intra-operative UOP  -- lasix gtt, reduce drip today  -- net negative 2.7L over last 24hrs      FEN / GI:   -- Replace lytes as needed  -- Nutrition: cardiac  -- GI ppx: famotidine  -- Bowel reg: miralax  -- Hepatology consult to acute liver failure, appreciate recs. Liver enzymes improving.  -- continue NAC      ID:   -- Tm: febrile to afebrile; WBC stable  -- Currently on dapto  -- Blood cultures sent 7/2, no growth to date      Heme/Onc:   -- H/H stable   -- Daily CBC  -- no intra-op product administered  -- 2u autologous  -- 1u pRBC, 1u FFP, 500mL albumin overnight (6/29)  -- Heparin gtt, increased to 1300 this morning  -- PTT goal of 45-54      Endo:   -- BG goal 140-180  -- Insulin gtt      PPx:   Feeding: cardiac  Analgesia/Sedation:none / PRN oxy,Fentanyl  Thromboembolic prevention: SCDs, heparin gtt  HOB >30: Yes  Stress Ulcer ppx: famotidine  Glucose control: Critical care goal 140-180 g/dl, ISS     Lines/Drains/Airway: CVC LINDSAY, Gabbie, L brachial A-line; LVAD      Dispo/Code Status/Palliative:   -- SICU / Full Code.

## 2022-07-03 NOTE — ASSESSMENT & PLAN NOTE
-S/P DT HM3 with MVR plus Protek Duo for RV support 6/29 (Grecia)  -CTS primary  -LVAD speed 5000 with flows 3.7-3.8, RVAD speed 5100, speed increased to 5200 on 7/1  - on 7/2, rvad removed with concern for hemolysis, Marcella increased to 20, LVAD speed increased to 5200  - remains on Epi 0.06. On  2.5 and Marcella 20 ppm.   Cardene prn to keep MAP 75-80  -Post op antibiotic per CTS (h/o Staph epi bacteremia, cleareed by ID for surgery with rec to continue antibiotic coverage for 2 weeks post op)  -AC per CTS      Procedure: Device Interrogation Including analysis of device parameters  Current Settings: Ventricular Assist Device  Review of device function is stable    TXP LVAD INTERROGATIONS 7/3/2022 7/3/2022 7/3/2022 7/3/2022 7/3/2022 7/3/2022 7/3/2022   Type HeartMate3 HeartMate3 HeartMate3 HeartMate3 HeartMate3 HeartMate3 HeartMate3   Flow 4.4 4.3 4.3 4.4 4.2 4.2 4.3   Speed 5200 5200 5250 5200 5200 5200 5200   PI 3.3 3.8 3.5 3.5 3.7 4.4 4   Power (Serna) 3.8 3.8 3.8 3.7 3.8 3.6 3.7   LSL 4800 4800 4800 4800 4800 4800 4800   Pulsatility - - - - - - -

## 2022-07-03 NOTE — PLAN OF CARE
Patient remains on 4 L NC with 20ppm nitric oxide. CVP remains elevated throughout day, CVP 20-21, Lasix infusion increased to 20 mg/hr, Lasix 20 IV push administered x1 dose, and 500 mg Diuril IVPB given. Good U/O. Mucomyst decreased to 15 ml/hr. Epinephrine weaned to 0.05 mcg/kg/min. Heparin infusion increased to 1200 Units/hr PTT goal 45-54. Cardene weaned to 5 mg/hr, Norvasc 5 mg x2 doses given today. MAP goal 75-85. Dobutamine remains at 2.5 mcg/kg/min. All VS stable. VAD speed at 5200, all VAD numbers stable. Beside echo ordered, waiting for it to be completed. Bowel regimen ordered, suppository administered, no bowel movement today. Labs monitored q4, electrolytes replaced PRN. Patient on Cardiac diet, minimal appetite today. Accuchecks monitored AC/HS, BS trending down slightly, Insulin infusion increased to 4.8 Units/hr. LVAD dressing completed per SICU RN, education provided to significant other at bedside.

## 2022-07-03 NOTE — ASSESSMENT & PLAN NOTE
Kevan Queen is a 37yoM with a history of polysubstance abuse who presented to the hospital with decompensated heart failure. He underwent LVAD, RVAD and MVr on 6/29/22. He is admitted to the ICU post-operatively.       Neuro/Psych:   -- Sedation: none  -- Pain: PRN oxy, fentanyl             Cards:   -- s/p LVAD, RVAD insertion and MVr. RVAD removed 7/1  -- HDS on epi 0.06, dobutamine 2.5 for inotropic support  -- s/p chest closure 6/30   -- MAP goal 75-85  -- Cardene for hypertension, starting norvasc today, wean cardene as able  -- Plan to remove remaining CT today      Pulm:   -- Goal O2 sat > 90%  -- ABG PRN  -- Extubated 7/1  -- weaned to minimal ventilatory support  -- iNO2 @ 20ppm  -- daily CXR      Renal:  -- Keep melendez for strict I/O  -- Trend BUN/Cr   -- 2L intra-operative UOP  -- lasix gtt, plan to continue aggressive diuresis today  -- net negative 3.1L over last 24hrs      FEN / GI:   -- Replace lytes as needed  -- Nutrition: full liquid  -- GI ppx: famotidine  -- Bowel reg: miralax  -- Hepatology consult to acute liver failure  -- continue NAC      ID:   -- Tm: febrile to 100.5; WBC stable  -- Currently on dapto  -- Blood cultures sent overnight      Heme/Onc:   -- H/H stable   -- Daily CBC  -- no intra-op product administered  -- 2u autologous  -- 1u pRBC, 1u FFP, 500mL albumin overnight (6/29)  -- Heparin gtt, increased to 1100 this morning  -- PTT goal of 45-54      Endo:   -- BG goal 140-180  -- Insulin gtt      PPx:   Feeding: full liquid diet  Analgesia/Sedation:none / PRN oxy,Fentanyl  Thromboembolic prevention: SCDs, heparin gtt  HOB >30: Yes  Stress Ulcer ppx: famotidine  Glucose control: Critical care goal 140-180 g/dl, ISS     Lines/Drains/Airway: CVC RIJ, Melendez, Chest tubes x 1, L brachial A-line; LVAD      Dispo/Code Status/Palliative:   -- SICU / Full Code.

## 2022-07-03 NOTE — PROGRESS NOTES
"[facility]  Endocrinology  Progress Note    Admit Date: 6/13/2022     Reason for Consult: Management of  type 2 DM, Hyperglycemia     Surgical Procedure and Date: none    Diabetes diagnosis year: 4/21/21    Home Diabetes Medications:  Detemir  23  units daily and Aspart   12  units TIDWM and  Mod dose correction insulin    Lab Results   Component Value Date    HGBA1C 9.2 (H) 05/20/2022           How often checking glucose at home? 1-3 x day   BG readings on regimen: 180- up to 300 once  Hypoglycemia on the regimen?  yes once- related to not really eating after taking aspart   Missed doses on regimen?  no    Diabetes Complications include:     Hyperglycemia    Complicating diabetes co morbidities:   History of MI, CHF, and CKD      HPI:   Patient is a 37 y.o. male with a diagnosis of type 2 DM and NIDCM with BiV systolic heart failure. Patient was presented at [facility] 4/2/22  and was  approved for VAD. Also recently admitted with [facility]; he had clinic appointment last week to f/u recent admit for hyperglycemic hyperosmolar syndrome but did not come as he was "feeling too bad" presents to  ED with SOB. Endocrinology consulted for BG/ DM management.                Interval HPI:   Overnight events: Remains in ICU. NAEON. Wound vac. Pressors. LVAD. BG above goal on IV insulin infusion at 3 u/hr with prn correction scale. diet advancing. 3 Days Post-Op  Eating:  Diet [facility]; Isolation Tray - Regular [place]; Fluid - 1500mL   25%  Nausea: No  Hypoglycemia and intervention: No  Fever: No  TPN and/or TF: No    BP (!) 90/0 (BP Location: Right arm, Patient Position: Lying)   Pulse 107   Temp 98.78 °F (37.1 °C)   Resp 15   Ht 6' 3" (1.905 m)   Wt 93.7 kg (206 lb 9.1 oz)   SpO2 96%   BMI 25.82 kg/m²     Labs Reviewed and Include    Recent Labs   Lab 07/03/22  0848   *   CALCIUM 9.0   ALBUMIN 3.4*   PROT 7.3      K 3.6   CO2 24      BUN 56*   CREATININE 1.4 "   ALKPHOS 109   *   *   BILITOT 1.8*     Lab Results   Component Value Date    WBC 24.35 (H) 07/03/2022    HGB 8.1 (L) 07/03/2022    HCT 24.4 (L) 07/03/2022    MCV 85 07/03/2022     07/03/2022     No results for input(s): TSH, FREET4 in the last 168 hours.  Lab Results   Component Value Date    HGBA1C 9.2 (H) 05/20/2022       Nutritional status:   Body mass index is 25.82 kg/m².  Lab Results   Component Value Date    ALBUMIN 3.4 (L) 07/03/2022    ALBUMIN 3.2 (L) 07/03/2022    ALBUMIN 3.3 (L) 07/03/2022     Lab Results   Component Value Date    PREALBUMIN 19 (L) 06/30/2022    PREALBUMIN 36 04/18/2022       Estimated Creatinine Clearance: 86.3 mL/min (based on SCr of 1.4 mg/dL).    Accu-Checks  Recent Labs     07/01/22  2133 07/02/22  0259 07/02/22  0804 07/02/22  1201 07/02/22  1736 07/02/22  2014 07/02/22  2015 07/02/22  2139 07/03/22  0157 07/03/22  0856   POCTGLUCOSE 179* 220* 235* 251* 307* 373* 378* 397* 243* 213*       Current Medications and/or Treatments Impacting Glycemic Control  Immunotherapy:    Immunosuppressants       None          Steroids:   Hormones (From admission, onward)                Start     Stop Route Frequency Ordered    07/01/22 2030  vasopressin (PITRESSIN) 0.2 Units/mL in dextrose 5 % 100 mL infusion         -- IV Continuous 07/01/22 2035          Pressors:    Autonomic Drugs (From admission, onward)                Start     Stop Route Frequency Ordered    06/29/22 1915  EPINEPHrine (ADRENALIN) 5 mg in dextrose 5 % 250 mL infusion         -- IV Continuous 06/29/22 1904          Hyperglycemia/Diabetes Medications:   Antihyperglycemics (From admission, onward)                Start     Stop Route Frequency Ordered    07/03/22 1130  insulin aspart U-100 pen 5-8 Units         -- SubQ 3 times daily with meals 07/03/22 0827    07/01/22 1845  insulin regular in 0.9 % NaCl 100 unit/100 mL (1 unit/mL) infusion        Question:  Enter initial dose (Units/hr):  Answer:  3    --  IV Continuous 07/01/22 1733    07/01/22 1833  insulin aspart U-100 pen 0-10 Units         -- SubQ As needed (PRN) 07/01/22 1733            ASSESSMENT and PLAN    * Acute on chronic combined systolic and diastolic heart failure, NYHA class 4  Optimize glucose control and  avoid hypoglycemia    Managed per primary.       Uncontrolled diabetes mellitus  Endocrinology consulted for BG management.   BG goal 140-180      Increase to transition insulin infusion at 4.8 u/hr  Start novolog 5- 8 units with meals.   BG monitoring ac/hs/0200 and moderate dose correction scale.     ** Please notify Endocrine for any change and/or advance in diet**  ** Please call Endocrine for any BG related issues **    Discharge Planning:   TBD. Please notify endocrinology prior to discharge.        Surgical wound present  Optimize BG for surgical wound healing.         CKD (chronic kidney disease) stage 3, GFR 30-59 ml/min    Titrate insulin slowly to avoid hypoglycemia as the risk of hypoglycemia increases with decreased creatinine clearance.    Estimated Creatinine Clearance: 67.2 mL/min (A) (based on SCr of 1.8 mg/dL (H)).          Yumiko Fowler, NP  Endocrinology  [facility]

## 2022-07-03 NOTE — PROGRESS NOTES
[facility]  Critical Care - Surgery  Progress Note    Patient Name: Kevan Queen  MRN: [id number]  Admission Date: 6/13/2022  Hospital Length of Stay: 20 days  Code Status: Prior  Attending Provider: Luis F Paige MD  Primary Care Provider: ORALIA Cline   Principal Problem: Acute on chronic combined systolic and diastolic heart failure, NYHA class 4    Subjective:     Hospital/ICU Course:  No notes on file    Interval History/Significant Events: No acute events overnight. LVAD flows stable. Increased lasix drip to help with diuresis due to hypertension and increased CVP. Blood cultures drawn overnight due to fevers.     Follow-up For: Procedure(s) (LRB):  CLOSURE, WOUND, STERNUM (N/A)  INSERTION-RIGHT VENTRICULAR ASSIST DEVICE (Right)  APPLICATION, WOUND VAC (N/A)  IRRIGATION, MEDIASTINUM    Post-Operative Day: 3 Days Post-Op    Objective:     Vital Signs (Most Recent):  Temp: 98.6 °F (37 °C) (07/03/22 0900)  Pulse: 108 (07/03/22 0900)  Resp: 17 (07/03/22 0900)  BP: (!) 90/0 (07/03/22 0700)  SpO2: (!) 93 % (07/03/22 0900)   Vital Signs (24h Range):  Temp:  [98.6 °F (37 °C)-101.3 °F (38.5 °C)] 98.6 °F (37 °C)  Pulse:  [105-122] 108  Resp:  [14-57] 17  SpO2:  [77 %-100 %] 93 %  BP: ()/(0) 90/0  Arterial Line BP: ()/(65-84) 98/83     Weight: 93.7 kg (206 lb 9.1 oz)  Body mass index is 25.82 kg/m².      Intake/Output Summary (Last 24 hours) at 7/3/2022 0914  Last data filed at 7/3/2022 0900  Gross per 24 hour   Intake 4328.64 ml   Output 7270 ml   Net -2941.36 ml       Physical Exam  Vitals reviewed.   Constitutional:       General: He is not in acute distress.  HENT:      Head: Normocephalic and atraumatic.      Nose: Nose normal.      Mouth/Throat:      Mouth: Mucous membranes are moist.   Eyes:      Pupils: Pupils are equal, round, and reactive to light.   Neck:      Comments:     Cardiovascular:      Rate and Rhythm: Regular rhythm. Tachycardia present.      Pulses: Normal  pulses.      Comments: S/p chest closure. Incision c/d/I.  Chest tubes in place with minimal sanguinous output      Pulmonary:      Effort: Pulmonary effort is normal. No respiratory distress.      Comments: On 4L NC  Abdominal:      General: Abdomen is flat. There is no distension.      Palpations: Abdomen is soft.   Genitourinary:     Comments: al  Musculoskeletal:      Comments: IABP site at left fem with dressing in place   Skin:     General: Skin is warm and dry.      Capillary Refill: Capillary refill takes less than 2 seconds.   Neurological:      General: No focal deficit present.      Mental Status: He is alert and oriented to person, place, and time.       Vents:  Vent Mode: Spont (07/01/22 1205)  Ventilator Initiated: Yes (06/29/22 1527)  Set Rate: 18 BPM (07/01/22 0737)  Vt Set: 580 mL (07/01/22 0737)  Pressure Support: 8 cmH20 (07/01/22 1205)  PEEP/CPAP: 5 cmH20 (07/01/22 1205)  Oxygen Concentration (%): 36 (07/03/22 0404)  Peak Airway Pressure: 15 cmH2O (07/01/22 1205)  Plateau Pressure: 21 cmH20 (07/01/22 1205)  Total Ve: 17 mL (07/01/22 1205)  Negative Inspiratory Force (cm H2O): -13 (07/01/22 1205)  F/VT Ratio<105 (RSBI): (!) 70.48 (07/01/22 1205)    Lines/Drains/Airways       Central Venous Catheter Line  Duration              Introducer with Double Lumen 06/29/22 1123 3 days    Percutaneous Central Line Insertion/Assessment - Triple Lumen  06/29/22 1200 left internal jugular 3 days              Drain  Duration                  Urethral Catheter 06/29/22 0840 Straight-tip;Temperature probe;Non-latex 14 Fr. 4 days         Chest Tube 06/29/22 1425 2 Right Pleural 32 Fr. 3 days              Arterial Line  Duration             Arterial Line 06/29/22 0832 Left Brachial 4 days              Line  Duration                  VAD 06/29/22 1115 Left ventricular assist device HeartMate 3 3 days              Peripheral Intravenous Line  Duration                  Peripheral IV - Single Lumen 06/29/22 1330 18 G  Right Antecubital 3 days         Peripheral IV - Single Lumen 07/02/22 2226 20 G Anterior;Distal;Left Forearm <1 day                    Significant Labs:    CBC/Anemia Profile:  Recent Labs   Lab 07/02/22  0805 07/02/22  1200 07/03/22  0352   WBC 24.99* 24.96* 24.35*   HGB 8.4* 8.5* 8.1*   HCT 25.5* 25.2* 24.4*    198 200   MCV 88 86 85   RDW 15.3* 15.1* 15.2*   IRON  --   --  16*   FERRITIN  --   --  265        Chemistries:  Recent Labs   Lab 07/02/22 2010 07/03/22  0000 07/03/22  0352    138 140   K 3.5 3.3* 4.0    103 104   CO2 20* 23 23   BUN 62* 56* 55*   CREATININE 1.8* 1.6* 1.5*   CALCIUM 8.8 8.6* 9.4   ALBUMIN 3.3* 3.3* 3.2*   PROT 7.0 6.8 7.7   BILITOT 2.0* 1.8* 1.8*   ALKPHOS 111 110 112   * 920* 866*   AST 1,025* 859* 714*   MG 2.6 2.6 2.7*   PHOS 2.5* 1.9* 2.1*       ABGs:   Recent Labs   Lab 07/02/22  0306   PH 7.428   PCO2 35.6   HCO3 23.5*   POCSATURATED 98   BE -1     Lactic Acid:   Recent Labs   Lab 07/01/22  1807   LACTATE 2.2     All pertinent labs within the past 24 hours have been reviewed.    Significant Imaging:  I have reviewed all pertinent imaging results/findings within the past 24 hours.    Assessment/Plan:     * Acute on chronic combined systolic and diastolic heart failure, NYHA class 4  Kevan Queen is a 37yoM with a history of polysubstance abuse who presented to the hospital with decompensated heart failure. He underwent LVAD, RVAD and MVr on 6/29/22. He is admitted to the ICU post-operatively.       Neuro/Psych:   -- Sedation: none  -- Pain: PRN oxy, fentanyl             Cards:   -- s/p LVAD, RVAD insertion and MVr. RVAD removed 7/1  -- HDS on epi 0.06, dobutamine 2.5 for inotropic support  -- s/p chest closure 6/30   -- MAP goal 75-85  -- Cardene for hypertension, starting norvasc today, wean cardene as able  -- Plan to remove remaining CT today      Pulm:   -- Goal O2 sat > 90%  -- ABG PRN  -- Extubated 7/1  -- weaned to minimal ventilatory  support  -- iNO2 @ 20ppm  -- daily CXR      Renal:  -- Keep melendez for strict I/O  -- Trend BUN/Cr   -- 2L intra-operative UOP  -- lasix gtt, plan to continue aggressive diuresis today  -- net negative 3.1L over last 24hrs      FEN / GI:   -- Replace lytes as needed  -- Nutrition: full liquid  -- GI ppx: famotidine  -- Bowel reg: miralax  -- Hepatology consult to acute liver failure  -- continue NAC      ID:   -- Tm: febrile to 100.5; WBC stable  -- Currently on dapto  -- Blood cultures sent overnight      Heme/Onc:   -- H/H stable   -- Daily CBC  -- no intra-op product administered  -- 2u autologous  -- 1u pRBC, 1u FFP, 500mL albumin overnight (6/29)  -- Heparin gtt, increased to 1100 this morning  -- PTT goal of 45-54      Endo:   -- BG goal 140-180  -- Insulin gtt      PPx:   Feeding: full liquid diet  Analgesia/Sedation:none / PRN oxy,Fentanyl  Thromboembolic prevention: SCDs, heparin gtt  HOB >30: Yes  Stress Ulcer ppx: famotidine  Glucose control: Critical care goal 140-180 g/dl, ISS     Lines/Drains/Airway: CVC RIJ, Melendez, Chest tubes x 1, L brachial A-line; LVAD      Dispo/Code Status/Palliative:   -- SICU / Full Code.            Rebeca Horvath MD  Critical Care - Surgery  [facility]

## 2022-07-03 NOTE — SUBJECTIVE & OBJECTIVE
Interval History:     Pt s/e this am. Pt seemed to be doing better this morning and did not voice complaints. No shortness of breath, light headedness, chest pain.     Yesterday, his lasix ggt was increased. He appears to have had a good response and was net negative 3150 cc yesterday. However, his cvp is elevated to 21 this am, checked by RN and myself.     He has been making 200+ urine cc/hr. Rneal function, LD, and liver enzymes all trending in the right direction.     He remains on cardene 7.5, epi 0.06, Marcella 20 ppm, and lasix ggt 15/hr    Echo ordered by CTS for further evaluation of RV function.    Continuous Infusions:   sodium chloride 0.9% 5 mL/hr at 06/27/22 0055    sodium chloride 0.9% Stopped (07/02/22 0521)    acetylcysteine (ACETADOTE) infusion *optional fourth dose* 6.25 mg/kg/hr (07/03/22 1000)    DOBUTamine IV infusion (non-titrating) 2.5 mcg/kg/min (07/03/22 1000)    EPINEPHrine 0.06 mcg/kg/min (07/03/22 1000)    furosemide (LASIX) 2 mg/mL continuous infusion (non-titrating) 15 mg/hr (07/03/22 1000)    heparin (porcine) in 5 % dex Stopped (07/03/22 0817)    insulin regular 1 units/mL infusion orderable (TRANSFER) 4 Units/hr (07/03/22 1000)    nicardipine 10 mg/hr (07/03/22 1008)    nitric oxide gas      propofoL Stopped (07/01/22 1048)    vasopressin Stopped (07/02/22 0406)     Scheduled Meds:   albumin human 5%  25 g Intravenous Once    amLODIPine  5 mg Oral Daily    DAPTOmycin (CUBICIN)  IV  8 mg/kg Intravenous Q24H    famotidine (PF)  20 mg Intravenous BID    ferrous gluconate  324 mg Oral Daily with breakfast    insulin aspart U-100  5-8 Units Subcutaneous TIDWM    mupirocin   Nasal BID    polyethylene glycol  17 g Per NG tube Daily    warfarin  4 mg Oral Once     PRN Meds:sodium chloride, sodium chloride, sodium chloride, albumin human 5%, albuterol sulfate, bisacodyL, dextrose 10%, dextrose 10%, fentaNYL, glucagon (human recombinant), glucose, glucose, insulin aspart U-100, magnesium hydroxide  400 mg/5 ml, magnesium sulfate IVPB, oxyCODONE, potassium chloride, potassium chloride, sodium chloride 0.9%, sodium chloride 0.9%    Review of patient's allergies indicates:   Allergen Reactions    Aspirin Other (See Comments)     Mr. Thacker is enrolled in Dr. Paige's Alon Trial and cannot have any aspirin and/or aspirin-containing products. DO NOT cancel any orders for INV Aspirin 100 mg/Placebo. If you have questions, please contact Isabel @ [phone], [email or web address], secure chat or MS Teams message.    Bumex [bumetanide] Hives    Lactose Diarrhea     Other reaction(s): Abdominal distension, gaseous    Torsemide Hives     Objective:     Vital Signs (Most Recent):  Temp: 98.78 °F (37.1 °C) (07/03/22 1000)  Pulse: 107 (07/03/22 1000)  Resp: 15 (07/03/22 1000)  BP: (!) 90/0 (07/03/22 0700)  SpO2: 96 % (07/03/22 1000)   Vital Signs (24h Range):  Temp:  [98.6 °F (37 °C)-101.3 °F (38.5 °C)] 98.78 °F (37.1 °C)  Pulse:  [105-122] 107  Resp:  [11-57] 15  SpO2:  [77 %-100 %] 96 %  BP: ()/(0) 90/0  Arterial Line BP: ()/(65-85) 97/81     No data found.  Body mass index is 25.82 kg/m².      Intake/Output Summary (Last 24 hours) at 7/3/2022 1026  Last data filed at 7/3/2022 1000  Gross per 24 hour   Intake 4246.57 ml   Output 7210 ml   Net -2963.43 ml         Hemodynamic Parameters:         Physical Exam  Constitutional:       Comments: Intubated and sedated   HENT:      Head: Normocephalic and atraumatic.   Eyes:      Conjunctiva/sclera: Conjunctivae normal.      Pupils: Pupils are equal, round, and reactive to light.   Neck:      Comments: Left IJ TLC  Cardiovascular:      Comments: Smooth VAD hum  Pulmonary:      Breath sounds: Normal breath sounds.   Abdominal:      Palpations: Abdomen is soft.      Comments: Hypoactive bowel sounds   Musculoskeletal:         General: No swelling.      Cervical back: Neck supple.   Skin:     General: Skin is dry.      Capillary Refill: Capillary refill  takes 2 to 3 seconds.      Comments: Extremities cool to touch with fans   Neurological:      Comments: Expressive aphagia seemed to be improved today   Psychiatric:         Mood and Affect: Mood normal.         Behavior: Behavior normal.       Significant Labs:  CBC:  Recent Labs   Lab 07/02/22  0805 07/02/22  1200 07/03/22  0352   WBC 24.99* 24.96* 24.35*   RBC 2.91* 2.94* 2.86*   HGB 8.4* 8.5* 8.1*   HCT 25.5* 25.2* 24.4*    198 200   MCV 88 86 85   MCH 28.9 28.9 28.3   MCHC 32.9 33.7 33.2       BNP:  Recent Labs   Lab 06/28/22  0921 07/01/22  0411   * 520*       CMP:  Recent Labs   Lab 07/03/22  0000 07/03/22  0352 07/03/22  0848   * 189* 203*   CALCIUM 8.6* 9.4 9.0   ALBUMIN 3.3* 3.2* 3.4*   PROT 6.8 7.7 7.3    140 140   K 3.3* 4.0 3.6   CO2 23 23 24    104 104   BUN 56* 55* 56*   CREATININE 1.6* 1.5* 1.4   ALKPHOS 110 112 109   * 866* 881*   * 714* 632*   BILITOT 1.8* 1.8* 1.8*        Coagulation:   Recent Labs   Lab 07/02/22  0805 07/02/22  1200 07/02/22  1932 07/03/22  0352   INR 1.1 1.1  --  1.1   APTT 27.1 26.0 29.1 31.8       LDH:  Recent Labs   Lab 07/01/22  0412 07/01/22  1807 07/02/22  0309 07/02/22  0855 07/03/22  0352   LDH 1,040* 2,023* 2,838* 2,470* 1,464*       Microbiology:  Microbiology Results (last 7 days)       Procedure Component Value Units Date/Time    Blood culture [[id number]] Collected: 07/02/22 1618    Order Status: Completed Specimen: Blood from Peripheral, Antecubital, Right Updated: 07/03/22 0115     Blood Culture, Routine No Growth to date    Blood culture [[id number]] Collected: 07/02/22 1629    Order Status: Completed Specimen: Blood from Peripheral, Hand, Right Updated: 07/03/22 0115     Blood Culture, Routine No Growth to date    Blood culture [[id number]] Collected: 06/30/22 1836    Order Status: Completed Specimen: Blood from Peripheral, Wrist, Left Updated: 07/02/22 2012     Blood Culture, Routine No Growth to date      No Growth  to date      No Growth to date    Blood culture [[id number]] Collected: 06/30/22 1833    Order Status: Completed Specimen: Blood from Peripheral, Forearm, Right Updated: 07/02/22 2012     Blood Culture, Routine No Growth to date      No Growth to date      No Growth to date    Blood culture [[id number]]  (Abnormal)  (Susceptibility) Collected: 06/23/22 0108    Order Status: Completed Specimen: Blood from Peripheral, Hand, Right Updated: 07/01/22 1137     Blood Culture, Routine Gram stain dudley bottle: Gram positive cocci in clusters resembling Staph       Results called to and read back by: Mark Pickett RN  06/24/2022        14:36      STAPHYLOCOCCUS EPIDERMIDIS    Blood culture [[id number]] Collected: 06/25/22 0634    Order Status: Completed Specimen: Blood from Peripheral, Wrist, Left Updated: 06/30/22 0812     Blood Culture, Routine No growth after 5 days.    Blood culture [[id number]] Collected: 06/25/22 0633    Order Status: Completed Specimen: Blood from Peripheral, Antecubital, Right Updated: 06/30/22 0812     Blood Culture, Routine No growth after 5 days.    Blood culture [[id number]] Collected: 06/23/22 0105    Order Status: Completed Specimen: Blood from Peripheral, Antecubital, Right Updated: 06/28/22 0612     Blood Culture, Routine No growth after 5 days.    Blood culture [[id number]]  (Abnormal)  (Susceptibility) Collected: 06/21/22 2250    Order Status: Completed Specimen: Blood from Peripheral, Wrist, Left Updated: 06/26/22 1059     Blood Culture, Routine Gram stain dudley bottle: Gram positive cocci in clusters resembling Staph      Results called to and read back by: Rosa Pardo RN. 06/22/2022  23:29      Gram stain aer bottle: Gram positive cocci in clusters resembling Staph       Positive results previously called 06/23/2022  04:33      STAPHYLOCOCCUS EPIDERMIDIS

## 2022-07-03 NOTE — PROGRESS NOTES
VSS throughout shift. Temp max 100.6F. Patient AAOx4; follows all commands and moves all extremities. On 4L NC with 20ppm Marcella; SATs >95% overnight. Sinus tach with -120s. MAPs 75-85. CVPs 18,16, and 17. See flowsheet for UOP and chest tube output overnight. Labs drawn per order. 80 meq IV K and 40 meq oral K administered. Cardiac diet with 1500ml FR. Importance of fluid restriction repeatedly discussed with patient; continues to need reinforcement. Plan of care discussed with Dr. Paige; communicated to patient and spouse at bedside. All questions and concerns addressed and answered. No breakdown noted to skin. Foams in place on pressure points. Bed plugged in and mattress inflated.    Gtts:  Epi 0.06 mcg/kg/min   2.5 mcg/kg/min  Cardene 7.5 mg/hr   Heparin 1000 units/hr  Lasix 15 mg/hr  Insulin 4 units/hr  Mucomyst 6.25 mg/kg/hr    HM3 VAD:  Speed 5200; LSL 4800  Flows 4.2-4.4  PI 3.3-4.4  Power 3.6-3.8

## 2022-07-03 NOTE — ASSESSMENT & PLAN NOTE
- Admit sCr 1.8, baseline ~ 1.5-1.7  - Lasix ggt increased from 7.5 mg/hr to 15 mg/hr   - creatinine improved from 2.1 to 1.5 today.

## 2022-07-03 NOTE — PROGRESS NOTES
[facility]  Heart Transplant  Progress Note    Patient Name: Kevan Queen  MRN: [id number]  Admission Date: 6/13/2022  Hospital Length of Stay: 20 days  Attending Physician: Luis F Paige MD  Primary Care Provider: ORALIA Cline  Principal Problem:Acute on chronic combined systolic and diastolic congestive heart failure, NYHA class 4    Subjective:     Interval History:     Pt s/e this am. Pt seemed to be doing better this morning and did not voice complaints. No shortness of breath, light headedness, chest pain.     Yesterday, his lasix ggt was increased. He appears to have had a good response and was net negative 3150 cc yesterday. However, his cvp is elevated to 21 this am, checked by RN and myself.     He has been making 200+ urine cc/hr. Rneal function, LD, and liver enzymes all trending in the right direction.     He remains on cardene 7.5, epi 0.06, Marcella 20 ppm, and lasix ggt 15/hr    Echo ordered by CTS for further evaluation of RV function.    Continuous Infusions:   sodium chloride 0.9% 5 mL/hr at 06/27/22 0055    sodium chloride 0.9% Stopped (07/02/22 0521)    acetylcysteine (ACETADOTE) infusion *optional fourth dose* 6.25 mg/kg/hr (07/03/22 1000)    DOBUTamine IV infusion (non-titrating) 2.5 mcg/kg/min (07/03/22 1000)    EPINEPHrine 0.06 mcg/kg/min (07/03/22 1000)    furosemide (LASIX) 2 mg/mL continuous infusion (non-titrating) 15 mg/hr (07/03/22 1000)    heparin (porcine) in 5 % dex Stopped (07/03/22 0817)    insulin regular 1 units/mL infusion orderable (TRANSFER) 4 Units/hr (07/03/22 1000)    nicardipine 10 mg/hr (07/03/22 1008)    nitric oxide gas      propofoL Stopped (07/01/22 1048)    vasopressin Stopped (07/02/22 0406)     Scheduled Meds:   albumin human 5%  25 g Intravenous Once    amLODIPine  5 mg Oral Daily    DAPTOmycin (CUBICIN)  IV  8 mg/kg Intravenous Q24H    famotidine (PF)  20 mg Intravenous BID    ferrous gluconate  324 mg Oral Daily with  breakfast    insulin aspart U-100  5-8 Units Subcutaneous TIDWM    mupirocin   Nasal BID    polyethylene glycol  17 g Per NG tube Daily    warfarin  4 mg Oral Once     PRN Meds:sodium chloride, sodium chloride, sodium chloride, albumin human 5%, albuterol sulfate, bisacodyL, dextrose 10%, dextrose 10%, fentaNYL, glucagon (human recombinant), glucose, glucose, insulin aspart U-100, magnesium hydroxide 400 mg/5 ml, magnesium sulfate IVPB, oxyCODONE, potassium chloride, potassium chloride, sodium chloride 0.9%, sodium chloride 0.9%    Review of patient's allergies indicates:   Allergen Reactions    Aspirin Other (See Comments)     Mr. Thacker is enrolled in Dr. Paige's Alon Trial and cannot have any aspirin and/or aspirin-containing products. DO NOT cancel any orders for INV Aspirin 100 mg/Placebo. If you have questions, please contact Isabel @ [phone], [phone],[email or web address], secure chat or MS Teams message.    Bumex [bumetanide] Hives    Lactose Diarrhea     Other reaction(s): Abdominal distension, gaseous    Torsemide Hives     Objective:     Vital Signs (Most Recent):  Temp: 98.78 °F (37.1 °C) (07/03/22 1000)  Pulse: 107 (07/03/22 1000)  Resp: 15 (07/03/22 1000)  BP: (!) 90/0 (07/03/22 0700)  SpO2: 96 % (07/03/22 1000)   Vital Signs (24h Range):  Temp:  [98.6 °F (37 °C)-101.3 °F (38.5 °C)] 98.78 °F (37.1 °C)  Pulse:  [105-122] 107  Resp:  [11-57] 15  SpO2:  [77 %-100 %] 96 %  BP: ()/(0) 90/0  Arterial Line BP: ()/(65-85) 97/81     No data found.  Body mass index is 25.82 kg/m².      Intake/Output Summary (Last 24 hours) at 7/3/2022 1026  Last data filed at 7/3/2022 1000  Gross per 24 hour   Intake 4246.57 ml   Output 7210 ml   Net -2963.43 ml         Hemodynamic Parameters:         Physical Exam  Constitutional:       Comments: Intubated and sedated   HENT:      Head: Normocephalic and atraumatic.   Eyes:      Conjunctiva/sclera: Conjunctivae normal.      Pupils: Pupils are equal,  round, and reactive to light.   Neck:      Comments: Left IJ TLC  Cardiovascular:      Comments: Smooth VAD hum  Pulmonary:      Breath sounds: Normal breath sounds.   Abdominal:      Palpations: Abdomen is soft.      Comments: Hypoactive bowel sounds   Musculoskeletal:         General: No swelling.      Cervical back: Neck supple.   Skin:     General: Skin is dry.      Capillary Refill: Capillary refill takes 2 to 3 seconds.      Comments: Extremities cool to touch with fans   Neurological:      Comments: Expressive aphagia seemed to be improved today   Psychiatric:         Mood and Affect: Mood normal.         Behavior: Behavior normal.       Significant Labs:  CBC:  Recent Labs   Lab 07/02/22  0805 07/02/22  1200 07/03/22  0352   WBC 24.99* 24.96* 24.35*   RBC 2.91* 2.94* 2.86*   HGB 8.4* 8.5* 8.1*   HCT 25.5* 25.2* 24.4*    198 200   MCV 88 86 85   MCH 28.9 28.9 28.3   MCHC 32.9 33.7 33.2       BNP:  Recent Labs   Lab 06/28/22  0921 07/01/22  0411   * 520*       CMP:  Recent Labs   Lab 07/03/22  0000 07/03/22  0352 07/03/22  0848   * 189* 203*   CALCIUM 8.6* 9.4 9.0   ALBUMIN 3.3* 3.2* 3.4*   PROT 6.8 7.7 7.3    140 140   K 3.3* 4.0 3.6   CO2 23 23 24    104 104   BUN 56* 55* 56*   CREATININE 1.6* 1.5* 1.4   ALKPHOS 110 112 109   * 866* 881*   * 714* 632*   BILITOT 1.8* 1.8* 1.8*        Coagulation:   Recent Labs   Lab 07/02/22  0805 07/02/22  1200 07/02/22  1932 07/03/22  0352   INR 1.1 1.1  --  1.1   APTT 27.1 26.0 29.1 31.8       LDH:  Recent Labs   Lab 07/01/22  0412 07/01/22  1807 07/02/22  0309 07/02/22  0855 07/03/22  0352   LDH 1,040* 2,023* 2,838* 2,470* 1,464*       Microbiology:  Microbiology Results (last 7 days)       Procedure Component Value Units Date/Time    Blood culture [[id number]] Collected: 07/02/22 1618    Order Status: Completed Specimen: Blood from Peripheral, Antecubital, Right Updated: 07/03/22 0115     Blood Culture, Routine No Growth to  date    Blood culture [[id number]] Collected: 07/02/22 1629    Order Status: Completed Specimen: Blood from Peripheral, Hand, Right Updated: 07/03/22 0115     Blood Culture, Routine No Growth to date    Blood culture [[id number]] Collected: 06/30/22 1836    Order Status: Completed Specimen: Blood from Peripheral, Wrist, Left Updated: 07/02/22 2012     Blood Culture, Routine No Growth to date      No Growth to date      No Growth to date    Blood culture [[id number]] Collected: 06/30/22 1833    Order Status: Completed Specimen: Blood from Peripheral, Forearm, Right Updated: 07/02/22 2012     Blood Culture, Routine No Growth to date      No Growth to date      No Growth to date    Blood culture [[id number]]  (Abnormal)  (Susceptibility) Collected: 06/23/22 0108    Order Status: Completed Specimen: Blood from Peripheral, Hand, Right Updated: 07/01/22 1137     Blood Culture, Routine Gram stain dudley bottle: Gram positive cocci in clusters resembling Staph       Results called to and read back by: Mark Pickett RN  06/24/2022        14:36      STAPHYLOCOCCUS EPIDERMIDIS    Blood culture [[id number]] Collected: 06/25/22 0634    Order Status: Completed Specimen: Blood from Peripheral, Wrist, Left Updated: 06/30/22 0812     Blood Culture, Routine No growth after 5 days.    Blood culture [[id number]] Collected: 06/25/22 0633    Order Status: Completed Specimen: Blood from Peripheral, Antecubital, Right Updated: 06/30/22 0812     Blood Culture, Routine No growth after 5 days.    Blood culture [[id number]] Collected: 06/23/22 0105    Order Status: Completed Specimen: Blood from Peripheral, Antecubital, Right Updated: 06/28/22 0612     Blood Culture, Routine No growth after 5 days.    Blood culture [[id number]]  (Abnormal)  (Susceptibility) Collected: 06/21/22 2250    Order Status: Completed Specimen: Blood from Peripheral, Wrist, Left Updated: 06/26/22 1059     Blood Culture, Routine Gram stain dudley bottle: Gram positive cocci in  "clusters resembling Staph      Results called to and read back by: Rosa Pardo RN. 06/22/2022  23:29      Gram stain aer bottle: Gram positive cocci in clusters resembling Staph       Positive results previously called 06/23/2022  04:33      STAPHYLOCOCCUS EPIDERMIDIS          Assessment and Plan:     38 yo male with NIDCM with BiV systolic heart failure, on home Milrinone at 0.375 mcg/kg/min, presented at [facility] 4/2/22 ,was not evaluated for OHT as he has recently quit smoking in April 2022 but was approved for VAD with plan to begin OHT evaluation in upcoming months if Mr Queen is stable and suitable for OHT eval (blood group A), 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 Went to ED at [facility] 6/10 but left after waiting 4 hours. Had clinic appointment with us today, but arrived to [facility] early this morning and decided to go to the ED instead. Baseline Lasix dose is 80 mg bid. Reports taking 240 mg qd past 3 days with no improvement. BNP is 1701, up from 898 on 6/2 and 49 on 5/24. sCr is 1.8 with baseline ~ 1.5-1.7. sPO2 on RA is 93%. Wife at bedside    He has been given Lasix 80 mg IVP in the ED with plan to start Lasix gtt at 20 mg/hr           * Acute on chronic combined systolic and diastolic congestive heart failure, NYHA class 4  - NIDCM  - Was not evaluated for OHTx as he quit smoking in April 2022  - Received HM3 on 6/29  - See LVAD    Unlikely OHTx candidate with smoking history (quit April 2022)     LVAD (left ventricular assist device) present  -S/P DT HM3 with MVR plus Protek Duo for RV support 6/29 (Grecia)  -CTS primary  -LVAD speed 5000 with flows 3.7-3.8, RVAD speed " 5100, speed increased to 5200 on 7/1  - on 7/2, rvad removed with concern for hemolysis, Marcella increased to 20, LVAD speed increased to 5200  - remains on Epi 0.06. On  2.5 and Marcella 20 ppm.   Cardene prn to keep MAP 75-80  -Post op antibiotic per CTS (h/o Staph epi bacteremia, cleareed by ID for surgery with rec to continue antibiotic coverage for 2 weeks post op)  -AC per CTS      Procedure: Device Interrogation Including analysis of device parameters  Current Settings: Ventricular Assist Device  Review of device function is stable    TXP LVAD INTERROGATIONS 7/3/2022 7/3/2022 7/3/2022 7/3/2022 7/3/2022 7/3/2022 7/3/2022   Type HeartMate3 HeartMate3 HeartMate3 HeartMate3 HeartMate3 HeartMate3 HeartMate3   Flow 4.4 4.3 4.3 4.4 4.2 4.2 4.3   Speed 5200 5200 5250 5200 5200 5200 5200   PI 3.3 3.8 3.5 3.5 3.7 4.4 4   Power (Serna) 3.8 3.8 3.8 3.7 3.8 3.6 3.7   LSL 4800 4800 4800 4800 4800 4800 4800   Pulsatility - - - - - - -       Staphylococcus epidermidis bacteremia  -Blood cultures 6/20 and repeat 6/21 positive for Staph Epi. One of two blood cultures from 6/23 positive for Staph (taken prior to line exchange). Repeat cultures sent 6/25 NGTD.   -IJ exchanged on 6/23, IABP exchanged 6/23  -Appreciate ID's help again:   Currently on IV Dapto per ID recs         Uncontrolled diabetes mellitus  -Admitted 5/24-5/27 with hyperglycemia hyperosmolar syndrome  -Hgb A1C 9.2 on 5/20/22  -Endocrine following, on insulin gtt    CKD (chronic kidney disease) stage 3, GFR 30-59 ml/min  - Admit sCr 1.8, baseline ~ 1.5-1.7  - Lasix ggt increased from 7.5 mg/hr to 15 mg/hr   - creatinine improved from 2.1 to 1.5 today.    Cardiogenic shock  -See LVAD        René Barnes MD  Heart Transplant  [facility]

## 2022-07-04 LAB
ALBUMIN SERPL BCP-MCNC: 2.9 G/DL (ref 3.5–5.2)
ALBUMIN SERPL BCP-MCNC: 3.1 G/DL (ref 3.5–5.2)
ALBUMIN SERPL BCP-MCNC: 3.1 G/DL (ref 3.5–5.2)
ALBUMIN SERPL BCP-MCNC: 3.2 G/DL (ref 3.5–5.2)
ALBUMIN SERPL BCP-MCNC: 3.3 G/DL (ref 3.5–5.2)
ALBUMIN SERPL BCP-MCNC: 3.4 G/DL (ref 3.5–5.2)
ALBUMIN SERPL BCP-MCNC: 3.4 G/DL (ref 3.5–5.2)
ALLENS TEST: ABNORMAL
ALLENS TEST: ABNORMAL
ALP SERPL-CCNC: 105 U/L (ref 55–135)
ALP SERPL-CCNC: 112 U/L (ref 55–135)
ALP SERPL-CCNC: 114 U/L (ref 55–135)
ALP SERPL-CCNC: 115 U/L (ref 55–135)
ALP SERPL-CCNC: 117 U/L (ref 55–135)
ALP SERPL-CCNC: 117 U/L (ref 55–135)
ALP SERPL-CCNC: 122 U/L (ref 55–135)
ALT SERPL W/O P-5'-P-CCNC: 483 U/L (ref 10–44)
ALT SERPL W/O P-5'-P-CCNC: 531 U/L (ref 10–44)
ALT SERPL W/O P-5'-P-CCNC: 533 U/L (ref 10–44)
ALT SERPL W/O P-5'-P-CCNC: 588 U/L (ref 10–44)
ALT SERPL W/O P-5'-P-CCNC: 658 U/L (ref 10–44)
ALT SERPL W/O P-5'-P-CCNC: 658 U/L (ref 10–44)
ALT SERPL W/O P-5'-P-CCNC: 740 U/L (ref 10–44)
ANION GAP SERPL CALC-SCNC: 14 MMOL/L (ref 8–16)
ANION GAP SERPL CALC-SCNC: 15 MMOL/L (ref 8–16)
ANION GAP SERPL CALC-SCNC: 15 MMOL/L (ref 8–16)
ANION GAP SERPL CALC-SCNC: 17 MMOL/L (ref 8–16)
ANISOCYTOSIS BLD QL SMEAR: SLIGHT
APTT BLDCRRT: 33.6 SEC (ref 21–32)
APTT BLDCRRT: 38.3 SEC (ref 21–32)
AST SERPL-CCNC: 156 U/L (ref 10–40)
AST SERPL-CCNC: 188 U/L (ref 10–40)
AST SERPL-CCNC: 203 U/L (ref 10–40)
AST SERPL-CCNC: 245 U/L (ref 10–40)
AST SERPL-CCNC: 310 U/L (ref 10–40)
AST SERPL-CCNC: 310 U/L (ref 10–40)
AST SERPL-CCNC: 369 U/L (ref 10–40)
BASO STIPL BLD QL SMEAR: ABNORMAL
BASOPHILS NFR BLD: 0 % (ref 0–1.9)
BILIRUB DIRECT SERPL-MCNC: 1.1 MG/DL (ref 0.1–0.3)
BILIRUB SERPL-MCNC: 1.5 MG/DL (ref 0.1–1)
BILIRUB SERPL-MCNC: 1.6 MG/DL (ref 0.1–1)
BILIRUB SERPL-MCNC: 1.8 MG/DL (ref 0.1–1)
BNP SERPL-MCNC: 776 PG/ML (ref 0–99)
BUN SERPL-MCNC: 44 MG/DL (ref 6–20)
BUN SERPL-MCNC: 47 MG/DL (ref 6–20)
BUN SERPL-MCNC: 47 MG/DL (ref 6–20)
BUN SERPL-MCNC: 49 MG/DL (ref 6–20)
BUN SERPL-MCNC: 51 MG/DL (ref 6–20)
BUN SERPL-MCNC: 51 MG/DL (ref 6–20)
CALCIUM SERPL-MCNC: 8.6 MG/DL (ref 8.7–10.5)
CALCIUM SERPL-MCNC: 8.9 MG/DL (ref 8.7–10.5)
CALCIUM SERPL-MCNC: 8.9 MG/DL (ref 8.7–10.5)
CALCIUM SERPL-MCNC: 9 MG/DL (ref 8.7–10.5)
CALCIUM SERPL-MCNC: 9.3 MG/DL (ref 8.7–10.5)
CALCIUM SERPL-MCNC: 9.5 MG/DL (ref 8.7–10.5)
CHLORIDE SERPL-SCNC: 89 MMOL/L (ref 95–110)
CHLORIDE SERPL-SCNC: 91 MMOL/L (ref 95–110)
CHLORIDE SERPL-SCNC: 94 MMOL/L (ref 95–110)
CHLORIDE SERPL-SCNC: 95 MMOL/L (ref 95–110)
CHLORIDE SERPL-SCNC: 95 MMOL/L (ref 95–110)
CHLORIDE SERPL-SCNC: 97 MMOL/L (ref 95–110)
CK SERPL-CCNC: 1973 U/L (ref 20–200)
CO2 SERPL-SCNC: 25 MMOL/L (ref 23–29)
CO2 SERPL-SCNC: 25 MMOL/L (ref 23–29)
CO2 SERPL-SCNC: 26 MMOL/L (ref 23–29)
CO2 SERPL-SCNC: 26 MMOL/L (ref 23–29)
CO2 SERPL-SCNC: 27 MMOL/L (ref 23–29)
CO2 SERPL-SCNC: 28 MMOL/L (ref 23–29)
CREAT SERPL-MCNC: 1.3 MG/DL (ref 0.5–1.4)
CREAT SERPL-MCNC: 1.4 MG/DL (ref 0.5–1.4)
CREAT SERPL-MCNC: 1.5 MG/DL (ref 0.5–1.4)
CRP SERPL-MCNC: 157.7 MG/L (ref 0–8.2)
DELSYS: ABNORMAL
DELSYS: ABNORMAL
DIFFERENTIAL METHOD: ABNORMAL
EOSINOPHIL NFR BLD: 0.5 % (ref 0–8)
ERYTHROCYTE [DISTWIDTH] IN BLOOD BY AUTOMATED COUNT: 15.3 % (ref 11.5–14.5)
EST. GFR  (AFRICAN AMERICAN): >60 ML/MIN/1.73 M^2
EST. GFR  (NON AFRICAN AMERICAN): 58.6 ML/MIN/1.73 M^2
EST. GFR  (NON AFRICAN AMERICAN): >60 ML/MIN/1.73 M^2
EST. GFR  (NON AFRICAN AMERICAN): >60 ML/MIN/1.73 M^2
FIBRINOGEN PPP-MCNC: 772 MG/DL (ref 182–400)
FIO2: 36
FLOW: 4
GLUCOSE SERPL-MCNC: 125 MG/DL (ref 70–110)
GLUCOSE SERPL-MCNC: 199 MG/DL (ref 70–110)
GLUCOSE SERPL-MCNC: 213 MG/DL (ref 70–110)
GLUCOSE SERPL-MCNC: 280 MG/DL (ref 70–110)
GLUCOSE SERPL-MCNC: 309 MG/DL (ref 70–110)
GLUCOSE SERPL-MCNC: 313 MG/DL (ref 70–110)
HCO3 UR-SCNC: 28.4 MMOL/L (ref 24–28)
HCO3 UR-SCNC: 32.1 MMOL/L (ref 24–28)
HCT VFR BLD AUTO: 24.4 % (ref 40–54)
HGB BLD-MCNC: 7.9 G/DL (ref 14–18)
HYPOCHROMIA BLD QL SMEAR: ABNORMAL
IMM GRANULOCYTES # BLD AUTO: ABNORMAL K/UL (ref 0–0.04)
IMM GRANULOCYTES NFR BLD AUTO: ABNORMAL % (ref 0–0.5)
INR PPP: 1.2 (ref 0.8–1.2)
LDH SERPL L TO P-CCNC: 894 U/L (ref 110–260)
LYMPHOCYTES NFR BLD: 7 % (ref 18–48)
MAGNESIUM SERPL-MCNC: 2.4 MG/DL (ref 1.6–2.6)
MAGNESIUM SERPL-MCNC: 2.4 MG/DL (ref 1.6–2.6)
MCH RBC QN AUTO: 28.3 PG (ref 27–31)
MCHC RBC AUTO-ENTMCNC: 32.4 G/DL (ref 32–36)
MCV RBC AUTO: 88 FL (ref 82–98)
METHEMOGLOBIN: 0.1 % (ref 0–3)
MODE: ABNORMAL
MONOCYTES NFR BLD: 4 % (ref 4–15)
MYELOCYTES NFR BLD MANUAL: 0.5 %
NEUTROPHILS NFR BLD: 87.5 % (ref 38–73)
NEUTS BAND NFR BLD MANUAL: 0.5 %
NRBC BLD-RTO: 4 /100 WBC
PCO2 BLDA: 37.8 MMHG (ref 35–45)
PCO2 BLDA: 48.8 MMHG (ref 35–45)
PH SMN: 7.43 [PH] (ref 7.35–7.45)
PH SMN: 7.48 [PH] (ref 7.35–7.45)
PHOSPHATE SERPL-MCNC: 2.8 MG/DL (ref 2.7–4.5)
PHOSPHATE SERPL-MCNC: 3.7 MG/DL (ref 2.7–4.5)
PLATELET # BLD AUTO: 214 K/UL (ref 150–450)
PLATELET BLD QL SMEAR: ABNORMAL
PMV BLD AUTO: 10.9 FL (ref 9.2–12.9)
PO2 BLDA: 29 MMHG (ref 40–60)
PO2 BLDA: 69 MMHG (ref 80–100)
POC BE: 5 MMOL/L
POC BE: 8 MMOL/L
POC PCO2 TEMP: 37.8 MMHG
POC PH TEMP: 7.48
POC PO2 TEMP: 69 MMHG
POC SATURATED O2: 56 % (ref 95–100)
POC SATURATED O2: 95 % (ref 95–100)
POC TCO2: 30 MMOL/L (ref 23–27)
POC TCO2: 34 MMOL/L (ref 24–29)
POC TEMPERATURE: ABNORMAL
POCT GLUCOSE: 131 MG/DL (ref 70–110)
POCT GLUCOSE: 308 MG/DL (ref 70–110)
POCT GLUCOSE: 310 MG/DL (ref 70–110)
POCT GLUCOSE: 330 MG/DL (ref 70–110)
POCT GLUCOSE: 343 MG/DL (ref 70–110)
POCT GLUCOSE: 354 MG/DL (ref 70–110)
POCT GLUCOSE: 86 MG/DL (ref 70–110)
POCT GLUCOSE: 95 MG/DL (ref 70–110)
POLYCHROMASIA BLD QL SMEAR: ABNORMAL
POTASSIUM SERPL-SCNC: 3.1 MMOL/L (ref 3.5–5.1)
POTASSIUM SERPL-SCNC: 3.5 MMOL/L (ref 3.5–5.1)
POTASSIUM SERPL-SCNC: 3.5 MMOL/L (ref 3.5–5.1)
POTASSIUM SERPL-SCNC: 3.7 MMOL/L (ref 3.5–5.1)
POTASSIUM SERPL-SCNC: 3.9 MMOL/L (ref 3.5–5.1)
POTASSIUM SERPL-SCNC: 4.2 MMOL/L (ref 3.5–5.1)
PROT SERPL-MCNC: 6.5 G/DL (ref 6–8.4)
PROT SERPL-MCNC: 6.8 G/DL (ref 6–8.4)
PROT SERPL-MCNC: 6.8 G/DL (ref 6–8.4)
PROT SERPL-MCNC: 7 G/DL (ref 6–8.4)
PROT SERPL-MCNC: 7.1 G/DL (ref 6–8.4)
PROT SERPL-MCNC: 7.1 G/DL (ref 6–8.4)
PROT SERPL-MCNC: 7.3 G/DL (ref 6–8.4)
PROTHROMBIN TIME: 11.9 SEC (ref 9–12.5)
RBC # BLD AUTO: 2.79 M/UL (ref 4.6–6.2)
SAMPLE: ABNORMAL
SAMPLE: ABNORMAL
SITE: ABNORMAL
SITE: ABNORMAL
SODIUM SERPL-SCNC: 131 MMOL/L (ref 136–145)
SODIUM SERPL-SCNC: 132 MMOL/L (ref 136–145)
SODIUM SERPL-SCNC: 135 MMOL/L (ref 136–145)
SODIUM SERPL-SCNC: 136 MMOL/L (ref 136–145)
SODIUM SERPL-SCNC: 136 MMOL/L (ref 136–145)
SODIUM SERPL-SCNC: 137 MMOL/L (ref 136–145)
TIME NOTIFIED: 328
WBC # BLD AUTO: 24.46 K/UL (ref 3.9–12.7)

## 2022-07-04 PROCEDURE — 25000003 PHARM REV CODE 250

## 2022-07-04 PROCEDURE — 99900035 HC TECH TIME PER 15 MIN (STAT)

## 2022-07-04 PROCEDURE — 83880 ASSAY OF NATRIURETIC PEPTIDE: CPT | Performed by: STUDENT IN AN ORGANIZED HEALTH CARE EDUCATION/TRAINING PROGRAM

## 2022-07-04 PROCEDURE — 25000003 PHARM REV CODE 250: Performed by: STUDENT IN AN ORGANIZED HEALTH CARE EDUCATION/TRAINING PROGRAM

## 2022-07-04 PROCEDURE — 99233 SBSQ HOSP IP/OBS HIGH 50: CPT | Mod: ,,, | Performed by: INTERNAL MEDICINE

## 2022-07-04 PROCEDURE — 63600175 PHARM REV CODE 636 W HCPCS: Performed by: INTERNAL MEDICINE

## 2022-07-04 PROCEDURE — 83615 LACTATE (LD) (LDH) ENZYME: CPT | Performed by: THORACIC SURGERY (CARDIOTHORACIC VASCULAR SURGERY)

## 2022-07-04 PROCEDURE — 99291 PR CRITICAL CARE, E/M 30-74 MINUTES: ICD-10-PCS | Mod: ,,, | Performed by: ANESTHESIOLOGY

## 2022-07-04 PROCEDURE — 63600175 PHARM REV CODE 636 W HCPCS

## 2022-07-04 PROCEDURE — 63600367 HC NITRIC OXIDE PER HOUR

## 2022-07-04 PROCEDURE — 82550 ASSAY OF CK (CPK): CPT | Performed by: STUDENT IN AN ORGANIZED HEALTH CARE EDUCATION/TRAINING PROGRAM

## 2022-07-04 PROCEDURE — 85730 THROMBOPLASTIN TIME PARTIAL: CPT | Mod: 91 | Performed by: THORACIC SURGERY (CARDIOTHORACIC VASCULAR SURGERY)

## 2022-07-04 PROCEDURE — 97116 GAIT TRAINING THERAPY: CPT

## 2022-07-04 PROCEDURE — 84100 ASSAY OF PHOSPHORUS: CPT | Mod: 91 | Performed by: THORACIC SURGERY (CARDIOTHORACIC VASCULAR SURGERY)

## 2022-07-04 PROCEDURE — 94761 N-INVAS EAR/PLS OXIMETRY MLT: CPT

## 2022-07-04 PROCEDURE — 25000003 PHARM REV CODE 250: Performed by: THORACIC SURGERY (CARDIOTHORACIC VASCULAR SURGERY)

## 2022-07-04 PROCEDURE — 85027 COMPLETE CBC AUTOMATED: CPT | Performed by: STUDENT IN AN ORGANIZED HEALTH CARE EDUCATION/TRAINING PROGRAM

## 2022-07-04 PROCEDURE — 93750 INTERROGATION VAD IN PERSON: CPT | Mod: ,,, | Performed by: INTERNAL MEDICINE

## 2022-07-04 PROCEDURE — 85610 PROTHROMBIN TIME: CPT | Performed by: THORACIC SURGERY (CARDIOTHORACIC VASCULAR SURGERY)

## 2022-07-04 PROCEDURE — 83735 ASSAY OF MAGNESIUM: CPT | Mod: 91 | Performed by: STUDENT IN AN ORGANIZED HEALTH CARE EDUCATION/TRAINING PROGRAM

## 2022-07-04 PROCEDURE — 20000000 HC ICU ROOM

## 2022-07-04 PROCEDURE — 63600175 PHARM REV CODE 636 W HCPCS: Performed by: THORACIC SURGERY (CARDIOTHORACIC VASCULAR SURGERY)

## 2022-07-04 PROCEDURE — 83050 HGB METHEMOGLOBIN QUAN: CPT

## 2022-07-04 PROCEDURE — 85730 THROMBOPLASTIN TIME PARTIAL: CPT | Performed by: STUDENT IN AN ORGANIZED HEALTH CARE EDUCATION/TRAINING PROGRAM

## 2022-07-04 PROCEDURE — 85384 FIBRINOGEN ACTIVITY: CPT | Performed by: STUDENT IN AN ORGANIZED HEALTH CARE EDUCATION/TRAINING PROGRAM

## 2022-07-04 PROCEDURE — 80076 HEPATIC FUNCTION PANEL: CPT | Performed by: STUDENT IN AN ORGANIZED HEALTH CARE EDUCATION/TRAINING PROGRAM

## 2022-07-04 PROCEDURE — 63600175 PHARM REV CODE 636 W HCPCS: Performed by: STUDENT IN AN ORGANIZED HEALTH CARE EDUCATION/TRAINING PROGRAM

## 2022-07-04 PROCEDURE — 82803 BLOOD GASES ANY COMBINATION: CPT

## 2022-07-04 PROCEDURE — 83735 ASSAY OF MAGNESIUM: CPT | Mod: 91 | Performed by: THORACIC SURGERY (CARDIOTHORACIC VASCULAR SURGERY)

## 2022-07-04 PROCEDURE — 97530 THERAPEUTIC ACTIVITIES: CPT

## 2022-07-04 PROCEDURE — 85007 BL SMEAR W/DIFF WBC COUNT: CPT | Performed by: STUDENT IN AN ORGANIZED HEALTH CARE EDUCATION/TRAINING PROGRAM

## 2022-07-04 PROCEDURE — 83735 ASSAY OF MAGNESIUM: CPT | Performed by: STUDENT IN AN ORGANIZED HEALTH CARE EDUCATION/TRAINING PROGRAM

## 2022-07-04 PROCEDURE — 93750 PR INTERROGATE VENT ASSIST DEV, IN PERSON, W PHYSICIAN ANALYSIS: ICD-10-PCS | Mod: ,,, | Performed by: INTERNAL MEDICINE

## 2022-07-04 PROCEDURE — 99291 CRITICAL CARE FIRST HOUR: CPT | Mod: ,,, | Performed by: ANESTHESIOLOGY

## 2022-07-04 PROCEDURE — 84100 ASSAY OF PHOSPHORUS: CPT | Performed by: STUDENT IN AN ORGANIZED HEALTH CARE EDUCATION/TRAINING PROGRAM

## 2022-07-04 PROCEDURE — 25000003 PHARM REV CODE 250: Performed by: INTERNAL MEDICINE

## 2022-07-04 PROCEDURE — 99233 PR SUBSEQUENT HOSPITAL CARE,LEVL III: ICD-10-PCS | Mod: ,,, | Performed by: INTERNAL MEDICINE

## 2022-07-04 PROCEDURE — 86140 C-REACTIVE PROTEIN: CPT | Performed by: STUDENT IN AN ORGANIZED HEALTH CARE EDUCATION/TRAINING PROGRAM

## 2022-07-04 PROCEDURE — 27000248 HC VAD-ADDITIONAL DAY

## 2022-07-04 PROCEDURE — 80053 COMPREHEN METABOLIC PANEL: CPT | Mod: 91 | Performed by: THORACIC SURGERY (CARDIOTHORACIC VASCULAR SURGERY)

## 2022-07-04 PROCEDURE — 37799 UNLISTED PX VASCULAR SURGERY: CPT

## 2022-07-04 PROCEDURE — 27000221 HC OXYGEN, UP TO 24 HOURS

## 2022-07-04 PROCEDURE — 80053 COMPREHEN METABOLIC PANEL: CPT | Mod: 91 | Performed by: STUDENT IN AN ORGANIZED HEALTH CARE EDUCATION/TRAINING PROGRAM

## 2022-07-04 RX ORDER — HYDROMORPHONE HYDROCHLORIDE 1 MG/ML
0.2 INJECTION, SOLUTION INTRAMUSCULAR; INTRAVENOUS; SUBCUTANEOUS EVERY 6 HOURS PRN
Status: DISCONTINUED | OUTPATIENT
Start: 2022-07-04 | End: 2022-07-12

## 2022-07-04 RX ORDER — INSULIN ASPART 100 [IU]/ML
10 INJECTION, SOLUTION INTRAVENOUS; SUBCUTANEOUS
Status: DISCONTINUED | OUTPATIENT
Start: 2022-07-05 | End: 2022-07-05

## 2022-07-04 RX ORDER — FAMOTIDINE 20 MG/1
20 TABLET, FILM COATED ORAL 2 TIMES DAILY
Status: DISCONTINUED | OUTPATIENT
Start: 2022-07-04 | End: 2022-07-04

## 2022-07-04 RX ORDER — OXYCODONE HYDROCHLORIDE 10 MG/1
10 TABLET ORAL EVERY 6 HOURS PRN
Status: DISCONTINUED | OUTPATIENT
Start: 2022-07-04 | End: 2022-07-05

## 2022-07-04 RX ORDER — POTASSIUM CHLORIDE 29.8 MG/ML
40 INJECTION INTRAVENOUS
Status: DISCONTINUED | OUTPATIENT
Start: 2022-07-04 | End: 2022-07-06

## 2022-07-04 RX ORDER — HYDROMORPHONE HYDROCHLORIDE 1 MG/ML
0.2 INJECTION, SOLUTION INTRAMUSCULAR; INTRAVENOUS; SUBCUTANEOUS ONCE
Status: COMPLETED | OUTPATIENT
Start: 2022-07-04 | End: 2022-07-04

## 2022-07-04 RX ORDER — INSULIN ASPART 100 [IU]/ML
7 INJECTION, SOLUTION INTRAVENOUS; SUBCUTANEOUS
Status: DISCONTINUED | OUTPATIENT
Start: 2022-07-04 | End: 2022-07-04

## 2022-07-04 RX ORDER — POTASSIUM CHLORIDE 14.9 MG/ML
20 INJECTION INTRAVENOUS
Status: DISCONTINUED | OUTPATIENT
Start: 2022-07-04 | End: 2022-07-06

## 2022-07-04 RX ADMIN — INSULIN ASPART 5 UNITS: 100 INJECTION, SOLUTION INTRAVENOUS; SUBCUTANEOUS at 07:07

## 2022-07-04 RX ADMIN — INSULIN ASPART 4 UNITS: 100 INJECTION, SOLUTION INTRAVENOUS; SUBCUTANEOUS at 08:07

## 2022-07-04 RX ADMIN — OXYCODONE 5 MG: 5 TABLET ORAL at 03:07

## 2022-07-04 RX ADMIN — POTASSIUM CHLORIDE 40 MEQ: 29.8 INJECTION, SOLUTION INTRAVENOUS at 06:07

## 2022-07-04 RX ADMIN — HYDROMORPHONE HYDROCHLORIDE 0.2 MG: 1 INJECTION, SOLUTION INTRAMUSCULAR; INTRAVENOUS; SUBCUTANEOUS at 01:07

## 2022-07-04 RX ADMIN — HEPARIN SODIUM 1600 UNITS/HR: 5000 INJECTION INTRAVENOUS; SUBCUTANEOUS at 06:07

## 2022-07-04 RX ADMIN — INSULIN ASPART 7 UNITS: 100 INJECTION, SOLUTION INTRAVENOUS; SUBCUTANEOUS at 03:07

## 2022-07-04 RX ADMIN — CHLOROTHIAZIDE SODIUM 250 MG: 500 INJECTION, POWDER, LYOPHILIZED, FOR SOLUTION INTRAVENOUS at 04:07

## 2022-07-04 RX ADMIN — OXYCODONE 5 MG: 5 TABLET ORAL at 07:07

## 2022-07-04 RX ADMIN — OXYCODONE 5 MG: 5 TABLET ORAL at 11:07

## 2022-07-04 RX ADMIN — FUROSEMIDE 20 MG/HR: 10 INJECTION, SOLUTION INTRAMUSCULAR; INTRAVENOUS at 01:07

## 2022-07-04 RX ADMIN — POTASSIUM BICARBONATE 40 MEQ: 391 TABLET, EFFERVESCENT ORAL at 08:07

## 2022-07-04 RX ADMIN — WARFARIN SODIUM 6 MG: 5 TABLET ORAL at 05:07

## 2022-07-04 RX ADMIN — Medication 324 MG: at 07:07

## 2022-07-04 RX ADMIN — HEPARIN SODIUM 1300 UNITS/HR: 5000 INJECTION INTRAVENOUS; SUBCUTANEOUS at 12:07

## 2022-07-04 RX ADMIN — INSULIN ASPART 6 UNITS: 100 INJECTION, SOLUTION INTRAVENOUS; SUBCUTANEOUS at 11:07

## 2022-07-04 RX ADMIN — NICARDIPINE HYDROCHLORIDE 5 MG/HR: 0.2 INJECTION, SOLUTION INTRAVENOUS at 07:07

## 2022-07-04 RX ADMIN — HYDROMORPHONE HYDROCHLORIDE 0.2 MG: 1 INJECTION, SOLUTION INTRAMUSCULAR; INTRAVENOUS; SUBCUTANEOUS at 10:07

## 2022-07-04 RX ADMIN — DAPTOMYCIN 750 MG: 350 INJECTION, POWDER, LYOPHILIZED, FOR SOLUTION INTRAVENOUS at 12:07

## 2022-07-04 RX ADMIN — INSULIN ASPART 6 UNITS: 100 INJECTION, SOLUTION INTRAVENOUS; SUBCUTANEOUS at 03:07

## 2022-07-04 RX ADMIN — FAMOTIDINE 20 MG: 20 TABLET ORAL at 08:07

## 2022-07-04 RX ADMIN — MUPIROCIN: 20 OINTMENT TOPICAL at 08:07

## 2022-07-04 RX ADMIN — EPINEPHRINE 0.05 MCG/KG/MIN: 1 INJECTION INTRAMUSCULAR; INTRAVENOUS; SUBCUTANEOUS at 08:07

## 2022-07-04 RX ADMIN — NICARDIPINE HYDROCHLORIDE 5 MG/HR: 0.2 INJECTION, SOLUTION INTRAVENOUS at 12:07

## 2022-07-04 RX ADMIN — DOBUTAMINE 2.5 MCG/KG/MIN: 12.5 INJECTION, SOLUTION, CONCENTRATE INTRAVENOUS at 03:07

## 2022-07-04 RX ADMIN — HYDROMORPHONE HYDROCHLORIDE 0.2 MG: 1 INJECTION, SOLUTION INTRAMUSCULAR; INTRAVENOUS; SUBCUTANEOUS at 03:07

## 2022-07-04 RX ADMIN — OXYCODONE HYDROCHLORIDE 10 MG: 10 TABLET ORAL at 06:07

## 2022-07-04 RX ADMIN — POTASSIUM BICARBONATE 40 MEQ: 391 TABLET, EFFERVESCENT ORAL at 04:07

## 2022-07-04 RX ADMIN — NICARDIPINE HYDROCHLORIDE 5 MG/HR: 0.2 INJECTION, SOLUTION INTRAVENOUS at 04:07

## 2022-07-04 RX ADMIN — FUROSEMIDE 15 MG/HR: 10 INJECTION, SOLUTION INTRAMUSCULAR; INTRAVENOUS at 11:07

## 2022-07-04 RX ADMIN — POLYETHYLENE GLYCOL 3350 17 G: 17 POWDER, FOR SOLUTION ORAL at 08:07

## 2022-07-04 RX ADMIN — AMLODIPINE BESYLATE 10 MG: 10 TABLET ORAL at 08:07

## 2022-07-04 RX ADMIN — INSULIN ASPART 8 UNITS: 100 INJECTION, SOLUTION INTRAVENOUS; SUBCUTANEOUS at 07:07

## 2022-07-04 RX ADMIN — INSULIN ASPART 5 UNITS: 100 INJECTION, SOLUTION INTRAVENOUS; SUBCUTANEOUS at 11:07

## 2022-07-04 NOTE — ASSESSMENT & PLAN NOTE
Endocrinology consulted for BG management.   BG goal 140-180    - Transition insulin infusion at 3.5 u/hr with step-down parameters.   - Novolog 12 units TIDWM and prn for BG excursions [facility] (150/25) SSI.   - BG monitoring ac/hs/0200 and moderate dose correction scale.   - Hypoglycemia protocol in place.     ** Please notify Endocrine for any change and/or advance in diet**  ** Please call Endocrine for any BG related issues **    Discharge Planning:   TBD. Please notify endocrinology prior to discharge.

## 2022-07-04 NOTE — PROGRESS NOTES
VSS throughout shift. Temp max 100.F. Patient AAOx4; follows all commands and moves all extremities. Remains on 4L NC with 20ppm Marcella; SATs >95% overnight. Sinus tach with -110s. MAPs 75-85. CVPs 24, 26, and 24 (updated Dr. Flores and Dr. Paige throughout shift). One time dose of 20mg lasix administered. Around 4L UOP overnight. Labs drawn per order. Potassium replaced with 80meq IV and 80meq oral. Accuchecks ACHS. Cardiac diet with 1500ml FR. Patient and spouse frequently reminded of importance of fluid restriction; verbalized understanding.Small bowel movement overnight. Plan of care reviewed with patient and spouse at bedside. All questions and concerns addressed and answered. No breakdown noted to skin. Foams in place on sacrum; patient refusing foams on heels. Bed plugged in and mattress inflated.     Gtts:  Epi 0.05 mcg/kg/min   2.5 mcg/kg/min  Cardene 5 mg/hr   Heparin 1300 units/hr  Lasix 20 mg/hr  Mucomyst 6.25 mg/kg/hr  Insulin titrated off this shift per order     HM3 VAD:  Speed 5200; LSL 4800  Flows 4.3-4.5  PI 3.1-3.7  Power 3.6-3.8

## 2022-07-04 NOTE — PROGRESS NOTES
07/04/2022  Casey Arizmendi    Current provider:  Luis F Paige MD    Device interrogation:  TXP LVAD INTERROGATIONS 7/4/2022 7/4/2022 7/4/2022 7/4/2022 7/4/2022 7/4/2022 7/4/2022   Type HeartMate3 HeartMate3 HeartMate3 HeartMate3 HeartMate3 HeartMate3 HeartMate3   Flow 4.5 4.4 4.5 4.4 4.3 4.5 4.4   Speed 5200 5200 5200 5200 5200 5200 5200   PI 3.3 3.3 3.1 3.2 3.2 3.3 3.3   Power (Serna) 3.7 3.7 3.7 3.7 3.7 3.7 3.8   LSL 4800 4800 4800 4800 4800 4800 4800   Pulsatility - Intermittent pulse - - Intermittent pulse - -          Rounded on Kevan Queen to ensure all mechanical assist device settings (IABP or VAD) were appropriate and all parameters were within limits.  I was able to ensure all back up equipment was present, the staff had no issues, and the Perfusion Department daily rounding was complete.      For implantable VADs: Interrogation of Ventricular assist device was performed with analysis of device parameters and review of device function. I have personally reviewed the interrogation findings and agree with findings as stated.     In emergency, the nursing units have been notified to contact the perfusion department either by:  Calling [phone] from 630am to 4pm Mon thru Fri, utilizing the On-Call Finder functionality of Epic and searching for Perfusion, or by contacting the hospital  from 4pm to 630am and on weekends and asking to speak with the perfusionist on call.    4:26 PM

## 2022-07-04 NOTE — PROGRESS NOTES
[facility]  Critical Care - Surgery  Progress Note    Patient Name: Kevan Queen  MRN: [id number]  Admission Date: 6/13/2022  Hospital Length of Stay: 21 days  Code Status: Prior  Attending Provider: Luis F Paige MD  Primary Care Provider: ORALIA Cline   Principal Problem: Acute on chronic combined systolic and diastolic congestive heart failure, NYHA class 4    Subjective:     Hospital/ICU Course:  No notes on file    Interval History/Significant Events: No acute events overnight. LVAD flows stable. CVPs elevated overnight. Lasix drip increased with good response. 6.8L of urine out in the last 24 hours. Satting well on 4L NC.    Follow-up For: Procedure(s) (LRB):  CLOSURE, WOUND, STERNUM (N/A)  INSERTION-RIGHT VENTRICULAR ASSIST DEVICE (Right)  APPLICATION, WOUND VAC (N/A)  IRRIGATION, MEDIASTINUM    Post-Operative Day: 4 Days Post-Op    Objective:     Vital Signs (Most Recent):  Temp: 99.68 °F (37.6 °C) (07/04/22 0730)  Pulse: (!) 113 (07/04/22 0730)  Resp: 13 (07/04/22 0741)  BP: (!) 88/0 (07/04/22 0730)  SpO2: (!) 93 % (07/04/22 0730)   Vital Signs (24h Range):  Temp:  [98.24 °F (36.8 °C)-100.04 °F (37.8 °C)] 99.68 °F (37.6 °C)  Pulse:  [103-117] 113  Resp:  [11-43] 13  SpO2:  [80 %-99 %] 93 %  BP: (88-98)/(0) 88/0  Arterial Line BP: (81-99)/(60-85) 90/77     Weight: 93.7 kg (206 lb 9.1 oz)  Body mass index is 25.82 kg/m².      Intake/Output Summary (Last 24 hours) at 7/4/2022 0813  Last data filed at 7/4/2022 0701  Gross per 24 hour   Intake 3948.4 ml   Output 6690 ml   Net -2741.6 ml       Physical Exam  Vitals reviewed.   Constitutional:       General: He is not in acute distress.  HENT:      Head: Normocephalic and atraumatic.      Nose: Nose normal.      Mouth/Throat:      Mouth: Mucous membranes are moist.   Eyes:      Pupils: Pupils are equal, round, and reactive to light.   Neck:      Comments:     Cardiovascular:      Rate and Rhythm: Regular rhythm. Tachycardia present.       Pulses: Normal pulses.      Comments: S/p chest closure. Incision c/d/I.        Pulmonary:      Effort: Pulmonary effort is normal. No respiratory distress.      Comments: On 4L NC  Abdominal:      General: Abdomen is flat. There is no distension.      Palpations: Abdomen is soft.   Genitourinary:     Comments: melendez  Musculoskeletal:      Comments: IABP site at left fem with dressing in place   Skin:     General: Skin is warm and dry.      Capillary Refill: Capillary refill takes less than 2 seconds.   Neurological:      General: No focal deficit present.      Mental Status: He is alert and oriented to person, place, and time.       Vents:  Vent Mode: Spont (07/01/22 1205)  Ventilator Initiated: Yes (06/29/22 1527)  Set Rate: 18 BPM (07/01/22 0737)  Vt Set: 580 mL (07/01/22 0737)  Pressure Support: 8 cmH20 (07/01/22 1205)  PEEP/CPAP: 5 cmH20 (07/01/22 1205)  Oxygen Concentration (%): 36 (07/04/22 0337)  Peak Airway Pressure: 15 cmH2O (07/01/22 1205)  Plateau Pressure: 21 cmH20 (07/01/22 1205)  Total Ve: 17 mL (07/01/22 1205)  Negative Inspiratory Force (cm H2O): -13 (07/01/22 1205)  F/VT Ratio<105 (RSBI): (!) 70.48 (07/01/22 1205)    Lines/Drains/Airways       Central Venous Catheter Line  Duration              Introducer with Double Lumen 06/29/22 1123 4 days    Percutaneous Central Line Insertion/Assessment - Triple Lumen  06/29/22 1200 left internal jugular 4 days              Drain  Duration                  Urethral Catheter 06/29/22 0840 Straight-tip;Temperature probe;Non-latex 14 Fr. 4 days              Arterial Line  Duration             Arterial Line 06/29/22 0832 Left Brachial 4 days              Line  Duration                  VAD 06/29/22 1115 Left ventricular assist device HeartMate 3 4 days              Peripheral Intravenous Line  Duration                  Peripheral IV - Single Lumen 07/02/22 2226 20 G Anterior;Distal;Left Forearm 1 day                    Significant Labs:    CBC/Anemia  Profile:  Recent Labs   Lab 07/02/22  1200 07/03/22  0352 07/04/22  0301   WBC 24.96* 24.35* 24.46*   HGB 8.5* 8.1* 7.9*   HCT 25.2* 24.4* 24.4*    200 214   MCV 86 85 88   RDW 15.1* 15.2* 15.3*   IRON  --  16*  --    FERRITIN  --  265  --         Chemistries:  Recent Labs   Lab 07/03/22  0352 07/03/22  0848 07/03/22  2040 07/03/22  2330 07/04/22  0301      < > 137 136 137   K 4.0   < > 3.1* 3.5 3.7      < > 98 95 94*   CO2 23   < > 24 27 26   BUN 55*   < > 50* 51* 51*   CREATININE 1.5*   < > 1.5* 1.5* 1.5*   CALCIUM 9.4   < > 9.0 9.5 9.3   ALBUMIN 3.2*   < > 3.2* 3.3* 3.4*  3.4*   PROT 7.7   < > 6.9 7.3 7.1  7.1   BILITOT 1.8*   < > 1.5* 1.6* 1.8*  1.8*   ALKPHOS 112   < > 112 114 117  117   *   < > 722* 740* 658*  658*   *   < > 386* 369* 310*  310*   MG 2.7*  --  2.5  --  2.4   PHOS 2.1*  --  2.8  --  3.7    < > = values in this interval not displayed.       All pertinent labs within the past 24 hours have been reviewed.    Significant Imaging:  I have reviewed all pertinent imaging results/findings within the past 24 hours.    Assessment/Plan:     * Acute on chronic combined systolic and diastolic congestive heart failure, NYHA class 4  Kevan Queen is a 37yoM with a history of polysubstance abuse who presented to the hospital with decompensated heart failure. He underwent LVAD, RVAD and MVr on 6/29/22. He is admitted to the ICU post-operatively.       Neuro/Psych:   -- Sedation: none  -- Pain: PRN oxy, fentanyl             Cards:   -- s/p LVAD, RVAD insertion and MVr. RVAD removed 7/1  -- HDS on epi 0.05, dobutamine 2.5 for inotropic support  -- s/p chest closure 6/30   -- MAP goal 75-85  -- Cardene for hypertension, continue norvasc, wean cardene as able  -- Echo      Pulm:   -- Goal O2 sat > 90%  -- ABG PRN  -- Extubated 7/1  -- weaned to minimal ventilatory support  -- iNO2 @ 20ppm  -- daily CXR      Renal:  -- Keep melendez for strict I/O  -- Trend BUN/Cr   -- 2L  intra-operative UOP  -- lasix gtt, reduce drip today  -- net negative 2.7L over last 24hrs      FEN / GI:   -- Replace lytes as needed  -- Nutrition: cardiac  -- GI ppx: famotidine  -- Bowel reg: miralax  -- Hepatology consult to acute liver failure, appreciate recs. Liver enzymes improving.  -- continue NAC      ID:   -- Tm: febrile to afebrile; WBC stable  -- Currently on dapto  -- Blood cultures sent 7/2, no growth to date      Heme/Onc:   -- H/H stable   -- Daily CBC  -- no intra-op product administered  -- 2u autologous  -- 1u pRBC, 1u FFP, 500mL albumin overnight (6/29)  -- Heparin gtt, increased to 1300 this morning  -- PTT goal of 45-54      Endo:   -- BG goal 140-180  -- Insulin gtt      PPx:   Feeding: cardiac  Analgesia/Sedation:none / PRN oxy,Fentanyl  Thromboembolic prevention: SCDs, heparin gtt  HOB >30: Yes  Stress Ulcer ppx: famotidine  Glucose control: Critical care goal 140-180 g/dl, ISS     Lines/Drains/Airway: CVC Gabbie MONTOYA L brachial A-line; LVAD      Dispo/Code Status/Palliative:   -- SICU / Full Code.          Rebeca Horvath MD  Critical Care - Surgery  [facility]

## 2022-07-04 NOTE — PLAN OF CARE
Significant events: Epinephrine decreased to 0.03 mcg/kg/min, Dobutamine increased to 4 mcg/kg/min, and lasix decreased to 15 mg/hr. 250 mg Diuril administered for UOP goal 1-2 L over 24 hrs.   BG in 300's despite SQ and continuous infusion. Infusion and SQ coverage adjusted.   OOBTC ~ 4 hrs with 2 person assist.   Pain not well controlled, PRN medications adjusted.    Vitals/Respiratory: 4L NC.    20 ppm Nitric Oxide.    O2: >95%.   HR: 100-110 sinus tachycardia.   MAP: 75-90.  CVP: 19-19-24.  Temperature max: 100 F.   Gtts:  Epinephrine at 0.03 mcg/kg/min, Dobutamine at 4 mcg/kg/min, Cardene at 5 mg/hr, Heparin, Lasix, Insulin, and Mucomyst.  UOP: 2.2 L/shift from melendez catheter.        Last Bowel Movement: 7/3/22.   LVAD: HM3. Speed 5200. Low speed 4800. Flow 4.3-4.5.   PI 3.1-3.4.   Power: 3.7-3.8.  Neuro: Pupils equal and reactive.  AAOx4, follows commands, and moves all extremities purposefully.    Diet:  Cardiac. 1.5 L Fluid restriction.   Labs/Accuchecks:  CMP Q4 hrs. ACHS.      Plan:   Continue diuresis. Continue ICU care. Plan of care reviewed with patient and significant other, all questions answered.     Skin:  Chest incision covered with prevena wound vac. Immerse mattress in use. OOBTC with chair cushion. Sacral foam in use. Weight shifting assistance provided. No new skin breakdown noted.

## 2022-07-04 NOTE — PT/OT/SLP PROGRESS
Physical Therapy  Co-treatment with OT    Patient Name:  Kevan Queen   MRN:  [id number]    Recommendations:     Discharge Recommendations:  rehabilitation facility   Discharge Equipment Recommendations:  (will determine DME needs closer to discharge)   Barriers to discharge: Decreased caregiver support family may not be able to assist at current functional level.     Assessment:     Kevan Queen is a 37 y.o. male admitted with a medical diagnosis of Acute on chronic combined systolic and diastolic congestive heart failure, NYHA class 4.  He presents with the following impairments/functional limitations:  weakness, impaired endurance, impaired functional mobilty, gait instability, impaired balance, decreased safety awareness, decreased lower extremity function  Pt tolerated treatment better being able to get into chair and being able to begin gait training. Pt will benefit from cont skilled PT 6x/wk to progress physically. Pt will need inpt rehab when medically stable to maximize rehab potential. Pt is s/p LVAD placement.     Rehab Prognosis: Good; patient would benefit from acute skilled PT services to address these deficits and reach maximum level of function.    Recent Surgery: Procedure(s) (LRB):  CLOSURE, WOUND, STERNUM (N/A)  INSERTION-RIGHT VENTRICULAR ASSIST DEVICE (Right)  APPLICATION, WOUND VAC (N/A)  IRRIGATION, MEDIASTINUM 4 Days Post-Op    Plan:     During this hospitalization, patient to be seen 6 x/week to address the identified rehab impairments via gait training, therapeutic activities, therapeutic exercises and progress toward the following goals:    · Plan of Care Expires:  08/01/22    Subjective     Chief Complaint: pt c/o pain during treatment.   Patient/Family Comments/goals: to be able to take care of himself  Pain/Comfort:  · Pain Rating 1: 10/10 (chest)  · Pain Addressed 1: Reposition, Distraction, Pre-medicate for activity  · Pain Rating Post-Intervention 1: 10/10 (chest)      Objective:      Communicated with nurse prior to session.  Patient found supine with telemetry, arterial line, pulse ox (continuous), central line, LVAD, wound vac (nitric oxide, CVP) upon PT entry to room.     General Precautions: Standard, fall, LVAD, sternal   Orthopedic Precautions:N/A   Braces:    Respiratory Status: Room air     Functional Mobility:  · Bed Mobility:   Pt needed verbal cues for functional mobility with sternal precautions.   · Rolling Left:  maximal assistance and of 2 persons  · Supine to Sit: maximal assistance and of 2 persons  ·   · Transfers:     · Sit to Stand:  moderate assistance with hand-held assist  ·   · Gait: pt received gait training ~ 4 side steps from bed to bedside chair with min assist x 2.     Due to pt complex medical condition, the skill of 2 licensed therapists is needed to maximize treatment session and progression towards goals        AM-PAC 6 CLICK MOBILITY  Turning over in bed (including adjusting bedclothes, sheets and blankets)?: 2  Sitting down on and standing up from a chair with arms (e.g., wheelchair, bedside commode, etc.): 2  Moving from lying on back to sitting on the side of the bed?: 2  Moving to and from a bed to a chair (including a wheelchair)?: 3  Need to walk in hospital room?: 2  Climbing 3-5 steps with a railing?: 1  Basic Mobility Total Score: 12       Therapeutic Activities and Exercises:   pt received verbal instructions in role of PT and POC. Pt verbally expressed understanding of such.     Patient left up in chair with all lines intact, call button in reach and RN notified..    GOALS:   Multidisciplinary Problems     Physical Therapy Goals        Problem: Physical Therapy    Goal Priority Disciplines Outcome Goal Variances Interventions   Physical Therapy Goal     PT, PT/OT Ongoing, Progressing     Description: Goals to be met by: 2022    Patient will increase functional independence with mobility by performin. Supine to sit with MInimal  Assistance  3. Sit to stand transfer with Minimal Assistance  4. Gait  x 50 feet with Contact Guard Assistance using No Assistive Device.                  Multidisciplinary Problems (Resolved)        Problem: Physical Therapy    Goal Priority Disciplines Outcome Goal Variances Interventions   Physical Therapy Goal   (Resolved)     PT, PT/OT Met     Description:     Problem: Physical Therapy  Goal: Physical Therapy Goal  Description: Goals to be met by: 2022     Patient will increase functional independence with mobility by performin. Lower extremity exercise program without IABP 30 reps per handout, with independence  2.Upper extremity exercise program 30 reps per handout, with independence                         Time Tracking:     PT Received On: 22  PT Start Time: 1348     PT Stop Time: 1404  PT Total Time (min): 16 min     Billable Minutes: Gait Training 16 min    Treatment Type: Treatment (Discharge)  PT/PTA: PT     PTA Visit Number: 0     2022

## 2022-07-04 NOTE — PROGRESS NOTES
Cardiac Surgery Progress Note    POD5 from LVAD and RVAD placement  POD4 from sternal closure    Progressing    Cardiac diet, 1500cc fluid restrict  Wean epi infusion 0.03 q12h  increase dobutamine infusion  Start Marcella wean this afternoon  Check echo  Continue heparin gtt, PTT goal 45-54  6mg coumadin today  Monitor speeds and flows  Cont ICU

## 2022-07-04 NOTE — SUBJECTIVE & OBJECTIVE
Interval History:   No shortness of breath, light headedness, chest pain. Continues to have an excellent response to Lasix. Net negative 2.7L in the last 24 hrs. CVP elevated but continues to improve.     He remains on Cardene, Epi, , , Marcella 20 ppm, lasix ggt     Continuous Infusions:   sodium chloride 0.9% 5 mL/hr at 06/27/22 0055    sodium chloride 0.9% Stopped (07/02/22 0521)    acetylcysteine (ACETADOTE) infusion *optional fourth dose* 6.25 mg/kg/hr (07/04/22 1000)    DOBUTamine IV infusion (non-titrating) 4 mcg/kg/min (07/04/22 1000)    EPINEPHrine 0.03 mcg/kg/min (07/04/22 1000)    furosemide (LASIX) 2 mg/mL continuous infusion (non-titrating) 15 mg/hr (07/04/22 1000)    heparin (porcine) in 5 % dex 1,500 Units/hr (07/04/22 1000)    insulin regular 1 units/mL infusion orderable (TRANSFER) Stopped (07/04/22 0123)    nicardipine 5 mg/hr (07/04/22 1000)    nitric oxide gas       Scheduled Meds:   albumin human 5%  25 g Intravenous Once    amLODIPine  10 mg Oral Daily    DAPTOmycin (CUBICIN)  IV  8 mg/kg Intravenous Q24H    ferrous gluconate  324 mg Oral Daily with breakfast    insulin aspart U-100  5-8 Units Subcutaneous TIDWM    polyethylene glycol  17 g Per NG tube Daily    potassium bicarbonate  40 mEq Oral BID    warfarin  6 mg Oral Daily     PRN Meds:sodium chloride, albumin human 5%, albuterol sulfate, bisacodyL, dextrose 10%, dextrose 10%, fentaNYL, glucagon (human recombinant), glucose, glucose, hydrALAZINE, insulin aspart U-100, magnesium hydroxide 400 mg/5 ml, magnesium sulfate IVPB, oxyCODONE, potassium chloride, potassium chloride, sodium chloride 0.9%, sodium chloride 0.9%    Review of patient's allergies indicates:   Allergen Reactions    Aspirin Other (See Comments)     Mr. Thacker is enrolled in Dr. Paige's Alon Trial and cannot have any aspirin and/or aspirin-containing products. DO NOT cancel any orders for INV Aspirin 100 mg/Placebo. If you have questions, please contact Isabel @ [phone],  Onset: 5/26/18  Location / description: pt having severe back pain in lower back. On Saturday had severe back spasms start that cause her to cry. Today about 7/10 more with movement and can't sit due to pain. Can lay down. Spasms are short in duration and heat helping.occuring on and off.  Ice to back also helping. Also doing stretches.     Asking if she can take more 400mg ibuprofen.   Advised able to take 600mg or 800mg if needed every 6-8 hours for pain. Advised to eat something prior to taking ibuprofen. States she has not had much of an appetite. Advised Could also take tylenol 1000mg every 8 hours and alternate them for greater pain relief.     Advise pt if gets too painful she should be seen in [facility] this weekend. Verbalizes understanding and asking for appt with PCP partner for Tuesday. Offered and accepted appt with Dr. Kincaid at 1140 on Tuesday 5/29/18. Will go to [facility] if worsens.     Precipitating Factors: na  Pain Scale (1 - 10), 10 highest: 1/10 now with standing  Associated Symptoms: see above  What improves/worsens symptoms: standing helps  Symptom specific medications: ibuprofen 400mg last at 0930  LMP : Patient's last menstrual period was 08/15/2012.  Are you pregnant or breast feeding: na  Recent Care:10/23/17        Reason for Disposition  • [1] MODERATE back pain (e.g., interferes with normal activities) AND [2] present > 3 days    Protocols used: BACK PAIN-A-       [email or web address], secure chat or MS Teams message.    Bumex [bumetanide] Hives    Lactose Diarrhea     Other reaction(s): Abdominal distension, gaseous    Torsemide Hives     Objective:     Vital Signs (Most Recent):  Temp: 99.5 °F (37.5 °C) (07/04/22 1000)  Pulse: (!) 111 (07/04/22 1000)  Resp: (!) 21 (07/04/22 1000)  BP: (!) 88/0 (07/04/22 0730)  SpO2: 97 % (07/04/22 1000)   Vital Signs (24h Range):  Temp:  [98.24 °F (36.8 °C)-100.04 °F (37.8 °C)] 99.5 °F (37.5 °C)  Pulse:  [103-117] 111  Resp:  [12-43] 21  SpO2:  [80 %-99 %] 97 %  BP: (88-98)/(0) 88/0  Arterial Line BP: (81-98)/(60-85) 89/78     No data found.  Body mass index is 25.82 kg/m².      Intake/Output Summary (Last 24 hours) at 7/4/2022 1038  Last data filed at 7/4/2022 1000  Gross per 24 hour   Intake 4568.69 ml   Output 6890 ml   Net -2321.31 ml         Hemodynamic Parameters:         Physical Exam  HENT:      Head: Normocephalic and atraumatic.   Eyes:      Conjunctiva/sclera: Conjunctivae normal.      Pupils: Pupils are equal, round, and reactive to light.   Neck:      Comments: Left IJ TLC  Cardiovascular:      Comments: Smooth VAD hum  Pulmonary:      Breath sounds: Normal breath sounds.   Abdominal:      Palpations: Abdomen is soft.   Musculoskeletal:         General: No swelling.      Cervical back: Neck supple.   Skin:     General: Skin is dry.      Capillary Refill: Capillary refill takes 2 to 3 seconds.   Psychiatric:         Mood and Affect: Mood normal.         Behavior: Behavior normal.       Significant Labs:  CBC:  Recent Labs   Lab 07/02/22  1200 07/03/22  0352 07/04/22  0301   WBC 24.96* 24.35* 24.46*   RBC 2.94* 2.86* 2.79*   HGB 8.5* 8.1* 7.9*   HCT 25.2* 24.4* 24.4*    200 214   MCV 86 85 88   MCH 28.9 28.3 28.3   MCHC 33.7 33.2 32.4       BNP:  Recent Labs   Lab 06/28/22  0921 07/01/22  0411 07/04/22  0301   * 520* 776*       CMP:  Recent Labs   Lab 07/03/22  2330 07/04/22  0301 07/04/22  0745    * 199* 313*   CALCIUM 9.5 9.3 9.0   ALBUMIN 3.3* 3.4*  3.4* 3.2*   PROT 7.3 7.1  7.1 7.0    137 135*   K 3.5 3.7 4.2   CO2 27 26 25   CL 95 94* 95   BUN 51* 51* 49*   CREATININE 1.5* 1.5* 1.5*   ALKPHOS 114 117  117 112   * 658*  658* 588*   * 310*  310* 245*   BILITOT 1.6* 1.8*  1.8* 1.8*        Coagulation:   Recent Labs   Lab 07/02/22  1200 07/02/22  1932 07/03/22  0352 07/03/22  1554 07/03/22  2330 07/04/22  0301 07/04/22  0643   INR 1.1  --  1.1  --   --  1.2  --    APTT 26.0   < > 31.8 34.5* 36.8*  --  33.6*    < > = values in this interval not displayed.       LDH:  Recent Labs   Lab 07/01/22  1807 07/02/22  0309 07/02/22  0855 07/03/22  0352 07/04/22  0301   LDH 2,023* 2,838* 2,470* 1,464* 894*       Microbiology:  Microbiology Results (last 7 days)       Procedure Component Value Units Date/Time    Blood culture [[id number]] Collected: 06/30/22 1836    Order Status: Completed Specimen: Blood from Peripheral, Wrist, Left Updated: 07/03/22 2012     Blood Culture, Routine No Growth to date      No Growth to date      No Growth to date      No Growth to date    Blood culture [[id number]] Collected: 06/30/22 1833    Order Status: Completed Specimen: Blood from Peripheral, Forearm, Right Updated: 07/03/22 2012     Blood Culture, Routine No Growth to date      No Growth to date      No Growth to date      No Growth to date    Blood culture [[id number]] Collected: 07/02/22 1618    Order Status: Completed Specimen: Blood from Peripheral, Antecubital, Right Updated: 07/03/22 1812     Blood Culture, Routine No Growth to date      No Growth to date    Blood culture [[id number]] Collected: 07/02/22 1629    Order Status: Completed Specimen: Blood from Peripheral, Hand, Right Updated: 07/03/22 1812     Blood Culture, Routine No Growth to date      No Growth to date    Blood culture [[id number]]  (Abnormal)  (Susceptibility) Collected: 06/23/22 0108    Order Status: Completed Specimen: Blood  from Peripheral, Hand, Right Updated: 07/01/22 1137     Blood Culture, Routine Gram stain dudley bottle: Gram positive cocci in clusters resembling Staph       Results called to and read back by: Mark Pickett RN  06/24/2022        14:36      STAPHYLOCOCCUS EPIDERMIDIS    Blood culture [[id number]] Collected: 06/25/22 0634    Order Status: Completed Specimen: Blood from Peripheral, Wrist, Left Updated: 06/30/22 0812     Blood Culture, Routine No growth after 5 days.    Blood culture [[id number]] Collected: 06/25/22 0633    Order Status: Completed Specimen: Blood from Peripheral, Antecubital, Right Updated: 06/30/22 0812     Blood Culture, Routine No growth after 5 days.    Blood culture [[id number]] Collected: 06/23/22 0105    Order Status: Completed Specimen: Blood from Peripheral, Antecubital, Right Updated: 06/28/22 0612     Blood Culture, Routine No growth after 5 days.

## 2022-07-04 NOTE — SUBJECTIVE & OBJECTIVE
"Interval HPI:   Overnight events: No acute events overnight. Patient on the SICU in room 25688 CVICU/49211 CVICU A. Blood glucose worsening. BG above goal on current insulin regimen (Transition Insulin Drip). Steroid use- None. 5 Days Post-Op  Renal function- Abnormal - Creatinine 1.5   Vasopressors-  Epinephrine     Eating: Diet Cardiac [facility]; Consistent Carbohydrate; Isolation Tray - Regular [place]; Fluid - 1500mL  100%  Nausea: No  Hypoglycemia and intervention: No  Fever: No  TPN and/or TF: No      BP 98/73   Pulse 104   Temp 98.4 °F (36.9 °C) (Oral)   Resp 11   Ht 6' 3" (1.905 m)   Wt 93.7 kg (206 lb 9.1 oz)   SpO2 99%   BMI 25.82 kg/m²     Labs Reviewed and Include    Recent Labs   Lab 07/05/22  0750 07/05/22  1450   *  --    CALCIUM 8.9  --    ALBUMIN 3.1*  --    PROT 6.8  --    *  --    K 3.9 3.9   CO2 27  --    CL 88*  --    BUN 41*  --    CREATININE 1.4  --    ALKPHOS 119  --    *  --    *  --    BILITOT 1.8*  --      Lab Results   Component Value Date    WBC 22.05 (H) 07/05/2022    HGB 7.8 (L) 07/05/2022    HCT 23.8 (L) 07/05/2022    MCV 88 07/05/2022     07/05/2022     No results for input(s): TSH, FREET4 in the last 168 hours.  Lab Results   Component Value Date    HGBA1C 9.2 (H) 05/20/2022       Nutritional status:   Body mass index is 25.82 kg/m².  Lab Results   Component Value Date    ALBUMIN 3.1 (L) 07/05/2022    ALBUMIN 3.1 (L) 07/05/2022    ALBUMIN 3.2 (L) 07/04/2022     Lab Results   Component Value Date    PREALBUMIN 19 (L) 06/30/2022    PREALBUMIN 36 04/18/2022       Estimated Creatinine Clearance: 86.3 mL/min (based on SCr of 1.4 mg/dL).    Accu-Checks  Recent Labs     07/04/22  0203 07/04/22  0750 07/04/22  1122 07/04/22  1513 07/04/22  1557 07/04/22  1715 07/04/22  2055 07/05/22  0314 07/05/22  0746 07/05/22  1137   POCTGLUCOSE 131* 354* 308* 330* 310* 343* 246* 201* 228* 201*       Current Medications and/or Treatments Impacting Glycemic " Control  Immunotherapy:    Immunosuppressants       None          Steroids:   Hormones (From admission, onward)                None          Pressors:    Autonomic Drugs (From admission, onward)                Start     Stop Route Frequency Ordered    06/29/22 1915  EPINEPHrine (ADRENALIN) 5 mg in dextrose 5 % 250 mL infusion         -- IV Continuous 06/29/22 1904          Hyperglycemia/Diabetes Medications:   Antihyperglycemics (From admission, onward)                Start     Stop Route Frequency Ordered    07/05/22 1645  insulin aspart U-100 pen 12 Units         -- SubQ 3 times daily with meals 07/05/22 1440    07/05/22 1445  insulin regular in 0.9 % NaCl 100 unit/100 mL (1 unit/mL) infusion        Question:  Enter initial dose (Units/hr):  Answer:  3.5    -- IV Continuous 07/05/22 1440    07/01/22 1833  insulin aspart U-100 pen 0-10 Units         -- SubQ As needed (PRN) 07/01/22 6578

## 2022-07-04 NOTE — ASSESSMENT & PLAN NOTE
Admitted 5/24-5/27 with hyperglycemia hyperosmolar syndrome  -Hgb A1C 9.2 on 5/20/22  -Endocrine following, on insulin gtt

## 2022-07-04 NOTE — ASSESSMENT & PLAN NOTE
S/P DT HM3 with MVR plus Protek Duo for RV support 6/29 (Grecia)  -Rvad removed with concern for hemolysis, Marcella increased to 20, LVAD speed increased to 5200 on 7/2  -Post op antibiotic per CTS (h/o Staph epi bacteremia, cleareed by ID for surgery with rec to continue antibiotic coverage for 2 weeks post op)  -AC per CTS    Procedure: Device Interrogation Including analysis of device parameters  Current Settings: Ventricular Assist Device  Review of device function is stable    TXP LVAD INTERROGATIONS 7/4/2022 7/4/2022 7/4/2022 7/4/2022 7/4/2022 7/4/2022 7/4/2022   Type HeartMate3 HeartMate3 HeartMate3 HeartMate3 HeartMate3 HeartMate3 HeartMate3   Flow 4.5 4.4 4.5 4.4 4.4 4.5 4.5   Speed 5200 5200 5200 5200 5200 5200 5200   PI 3.3 3.3 3.3 3.6 3.4 3.2 3.3   Power (Serna) 3.7 3.8 3.7 3.7 3.7 3.7 3.6   LSL 4800 4800 4800 4800 4800 4800 4800   Pulsatility - - - Intermittent pulse - - -

## 2022-07-04 NOTE — PROGRESS NOTES
[facility] - Surgical Intensive Care  Heart Transplant  Progress Note    Patient Name: Kevan Queen  MRN: [id number]  Admission Date: 6/13/2022  Hospital Length of Stay: 21 days  Attending Physician: Luis F Paige MD  Primary Care Provider: ORALIA Cline  Principal Problem:Acute on chronic combined systolic and diastolic congestive heart failure, NYHA class 4    Subjective:     Interval History:   No shortness of breath, light headedness, chest pain. Continues to have an excellent response to Lasix. Net negative 2.7L in the last 24 hrs. CVP elevated but continues to improve.     He is on Antibiotics for S.EPi bacteremia with end date per ID on 7/12.  He remains on Cardene, Epi, , , Marcella 20 ppm, lasix ggt     Continuous Infusions:   sodium chloride 0.9% 5 mL/hr at 06/27/22 0055    sodium chloride 0.9% Stopped (07/02/22 0521)    acetylcysteine (ACETADOTE) infusion *optional fourth dose* 6.25 mg/kg/hr (07/04/22 1000)    DOBUTamine IV infusion (non-titrating) 4 mcg/kg/min (07/04/22 1000)    EPINEPHrine 0.03 mcg/kg/min (07/04/22 1000)    furosemide (LASIX) 2 mg/mL continuous infusion (non-titrating) 15 mg/hr (07/04/22 1000)    heparin (porcine) in 5 % dex 1,500 Units/hr (07/04/22 1000)    insulin regular 1 units/mL infusion orderable (TRANSFER) Stopped (07/04/22 0123)    nicardipine 5 mg/hr (07/04/22 1000)    nitric oxide gas       Scheduled Meds:   albumin human 5%  25 g Intravenous Once    amLODIPine  10 mg Oral Daily    DAPTOmycin (CUBICIN)  IV  8 mg/kg Intravenous Q24H    ferrous gluconate  324 mg Oral Daily with breakfast    insulin aspart U-100  5-8 Units Subcutaneous TIDWM    polyethylene glycol  17 g Per NG tube Daily    potassium bicarbonate  40 mEq Oral BID    warfarin  6 mg Oral Daily     PRN Meds:sodium chloride, albumin human 5%, albuterol sulfate, bisacodyL, dextrose 10%, dextrose 10%, fentaNYL, glucagon (human recombinant), glucose, glucose, hydrALAZINE, insulin aspart U-100,  magnesium hydroxide 400 mg/5 ml, magnesium sulfate IVPB, oxyCODONE, potassium chloride, potassium chloride, sodium chloride 0.9%, sodium chloride 0.9%    Review of patient's allergies indicates:   Allergen Reactions    Aspirin Other (See Comments)     Mr. Thacker is enrolled in Dr. Paige's Alon Trial and cannot have any aspirin and/or aspirin-containing products. DO NOT cancel any orders for INV Aspirin 100 mg/Placebo. If you have questions, please contact Isabel @ [phone], [phone],[email or web address], secure chat or MS Teams message.    Bumex [bumetanide] Hives    Lactose Diarrhea     Other reaction(s): Abdominal distension, gaseous    Torsemide Hives     Objective:     Vital Signs (Most Recent):  Temp: 99.5 °F (37.5 °C) (07/04/22 1000)  Pulse: (!) 111 (07/04/22 1000)  Resp: (!) 21 (07/04/22 1000)  BP: (!) 88/0 (07/04/22 0730)  SpO2: 97 % (07/04/22 1000)   Vital Signs (24h Range):  Temp:  [98.24 °F (36.8 °C)-100.04 °F (37.8 °C)] 99.5 °F (37.5 °C)  Pulse:  [103-117] 111  Resp:  [12-43] 21  SpO2:  [80 %-99 %] 97 %  BP: (88-98)/(0) 88/0  Arterial Line BP: (81-98)/(60-85) 89/78     No data found.  Body mass index is 25.82 kg/m².      Intake/Output Summary (Last 24 hours) at 7/4/2022 1038  Last data filed at 7/4/2022 1000  Gross per 24 hour   Intake 4568.69 ml   Output 6890 ml   Net -2321.31 ml         Hemodynamic Parameters:         Physical Exam  HENT:      Head: Normocephalic and atraumatic.   Eyes:      Conjunctiva/sclera: Conjunctivae normal.      Pupils: Pupils are equal, round, and reactive to light.   Neck:      Comments: Left IJ TLC  Cardiovascular:      Comments: Smooth VAD hum  Pulmonary:      Breath sounds: Normal breath sounds.   Abdominal:      Palpations: Abdomen is soft.   Musculoskeletal:         General: No swelling.      Cervical back: Neck supple.   Skin:     General: Skin is dry.      Capillary Refill: Capillary refill takes 2 to 3 seconds.   Psychiatric:         Mood and Affect: Mood  normal.         Behavior: Behavior normal.       Significant Labs:  CBC:  Recent Labs   Lab 07/02/22  1200 07/03/22  0352 07/04/22  0301   WBC 24.96* 24.35* 24.46*   RBC 2.94* 2.86* 2.79*   HGB 8.5* 8.1* 7.9*   HCT 25.2* 24.4* 24.4*    200 214   MCV 86 85 88   MCH 28.9 28.3 28.3   MCHC 33.7 33.2 32.4       BNP:  Recent Labs   Lab 06/28/22  0921 07/01/22  0411 07/04/22  0301   * 520* 776*       CMP:  Recent Labs   Lab 07/03/22  2330 07/04/22  0301 07/04/22  0745   * 199* 313*   CALCIUM 9.5 9.3 9.0   ALBUMIN 3.3* 3.4*  3.4* 3.2*   PROT 7.3 7.1  7.1 7.0    137 135*   K 3.5 3.7 4.2   CO2 27 26 25   CL 95 94* 95   BUN 51* 51* 49*   CREATININE 1.5* 1.5* 1.5*   ALKPHOS 114 117  117 112   * 658*  658* 588*   * 310*  310* 245*   BILITOT 1.6* 1.8*  1.8* 1.8*        Coagulation:   Recent Labs   Lab 07/02/22  1200 07/02/22  1932 07/03/22  0352 07/03/22  1554 07/03/22  2330 07/04/22  0301 07/04/22  0643   INR 1.1  --  1.1  --   --  1.2  --    APTT 26.0   < > 31.8 34.5* 36.8*  --  33.6*    < > = values in this interval not displayed.       LDH:  Recent Labs   Lab 07/01/22  1807 07/02/22  0309 07/02/22  0855 07/03/22  0352 07/04/22  0301   LDH 2,023* 2,838* 2,470* 1,464* 894*       Microbiology:  Microbiology Results (last 7 days)       Procedure Component Value Units Date/Time    Blood culture [[id number]] Collected: 06/30/22 1836    Order Status: Completed Specimen: Blood from Peripheral, Wrist, Left Updated: 07/03/22 2012     Blood Culture, Routine No Growth to date      No Growth to date      No Growth to date      No Growth to date    Blood culture [[id number]] Collected: 06/30/22 1833    Order Status: Completed Specimen: Blood from Peripheral, Forearm, Right Updated: 07/03/22 2012     Blood Culture, Routine No Growth to date      No Growth to date      No Growth to date      No Growth to date    Blood culture [[id number]] Collected: 07/02/22 1618    Order Status: Completed  "Specimen: Blood from Peripheral, Antecubital, Right Updated: 07/03/22 1812     Blood Culture, Routine No Growth to date      No Growth to date    Blood culture [[id number]] Collected: 07/02/22 1629    Order Status: Completed Specimen: Blood from Peripheral, Hand, Right Updated: 07/03/22 1812     Blood Culture, Routine No Growth to date      No Growth to date    Blood culture [[id number]]  (Abnormal)  (Susceptibility) Collected: 06/23/22 0108    Order Status: Completed Specimen: Blood from Peripheral, Hand, Right Updated: 07/01/22 1137     Blood Culture, Routine Gram stain dudley bottle: Gram positive cocci in clusters resembling Staph       Results called to and read back by: Mark Pickett RN  06/24/2022        14:36      STAPHYLOCOCCUS EPIDERMIDIS    Blood culture [[id number]] Collected: 06/25/22 0634    Order Status: Completed Specimen: Blood from Peripheral, Wrist, Left Updated: 06/30/22 0812     Blood Culture, Routine No growth after 5 days.    Blood culture [[id number]] Collected: 06/25/22 0633    Order Status: Completed Specimen: Blood from Peripheral, Antecubital, Right Updated: 06/30/22 0812     Blood Culture, Routine No growth after 5 days.    Blood culture [[id number]] Collected: 06/23/22 0105    Order Status: Completed Specimen: Blood from Peripheral, Antecubital, Right Updated: 06/28/22 0612     Blood Culture, Routine No growth after 5 days.          Assessment and Plan:     38 yo male with NIDCM with BiV systolic heart failure, on home Milrinone at 0.375 mcg/kg/min, presented at [facility] 4/2/22 ,was not evaluated for OHT as he has recently quit smoking in April 2022 but was approved for VAD with plan to begin OHT evaluation in upcoming months if Mr Queen is stable and suitable for OHT eval (blood group A), issues with frozen shoulder following ICD implant in the past, had clinic appointment last week to f/u recent admit for hyperglycemic hyperosmolar syndrome but did not come as he was "feeling too bad" " presents to our ED with SOB at rest for 1 week, 6# weight gain (reports dry weight is 217#), inability to sleep past 3 nights 2/2 SOB (says he sleep on his side). Went to ED at [facility] 6/10 but left after waiting 4 hours. Had clinic appointment with us today, but arrived to [facility] early this morning and decided to go to the ED instead. Baseline Lasix dose is 80 mg bid. Reports taking 240 mg qd past 3 days with no improvement. BNP is 1701, up from 898 on 6/2 and 49 on 5/24. sCr is 1.8 with baseline ~ 1.5-1.7. sPO2 on RA is 93%.     * Acute on chronic combined systolic and diastolic congestive heart failure, NYHA class 4  - [facility]  - Was not evaluated for OHTx as he quit smoking in April 2022  - Received HM3 on 6/29  - See LVAD    Unlikely OHTx candidate with smoking history (quit April 2022)     LVAD (left ventricular assist device) present  S/P DT HM3 with MVR plus Protek Duo for RV support 6/29 (Grecia)  -Rvad removed with concern for hemolysis, Marcella increased to 20, LVAD speed increased to 5200 on 7/2  -Post op antibiotic per CTS (h/o Staph epi bacteremia, cleareed by ID for surgery with rec to continue antibiotic coverage for 2 weeks post op)  -AC per CTS    Procedure: Device Interrogation Including analysis of device parameters  Current Settings: Ventricular Assist Device  Review of device function is stable    TXP LVAD INTERROGATIONS 7/4/2022 7/4/2022 7/4/2022 7/4/2022 7/4/2022 7/4/2022 7/4/2022   Type HeartMate3 HeartMate3 HeartMate3 HeartMate3 HeartMate3 HeartMate3 HeartMate3   Flow 4.5 4.4 4.5 4.4 4.4 4.5 4.5   Speed 5200 5200 5200 5200 5200 5200 5200   PI 3.3 3.3 3.3 3.6 3.4 3.2 3.3   Power (Serna) 3.7 3.8 3.7 3.7 3.7 3.7 3.6   LSL 4800 4800 4800 4800 4800 4800 4800   Pulsatility - - - Intermittent pulse - - -       Staphylococcus epidermidis bacteremia  Blood cultures 6/20 and repeat 6/21 positive for Staph Epi. One of two blood cultures from 6/23 positive for Staph (taken prior to line exchange).  Repeat cultures sent 6/25 NGTD.   -IJ exchanged on 6/23, IABP exchanged 6/23  -14 day course of vancomycin from VAD implantation on 6/29. End date: 7/12/22        Uncontrolled diabetes mellitus  Admitted 5/24-5/27 with hyperglycemia hyperosmolar syndrome  -Hgb A1C 9.2 on 5/20/22  -Endocrine following, on insulin gtt    CKD (chronic kidney disease) stage 3, GFR 30-59 ml/min  SCr baseline ~ 1.5-1.7      Acute decompensated heart failure  -See LVAD        Kb Kwong MD  Heart Transplant  [facility] - [facility]

## 2022-07-04 NOTE — PLAN OF CARE
Problem: Physical Therapy  Goal: Physical Therapy Goal  Description: Goals to be met by: 2022    Patient will increase functional independence with mobility by performin. Supine to sit with MInimal Assistance  3. Sit to stand transfer with Minimal Assistance  4. Gait  x 50 feet with Contact Guard Assistance using No Assistive Device.       Outcome: Ongoing, Progressing   Goals remain appropriate.  2022

## 2022-07-04 NOTE — SUBJECTIVE & OBJECTIVE
Interval History/Significant Events: No acute events overnight. LVAD flows stable. CVPs elevated overnight. Lasix drip increased with good response. 6.8L of urine out in the last 24 hours. Satting well on 4L NC.    Follow-up For: Procedure(s) (LRB):  CLOSURE, WOUND, STERNUM (N/A)  INSERTION-RIGHT VENTRICULAR ASSIST DEVICE (Right)  APPLICATION, WOUND VAC (N/A)  IRRIGATION, MEDIASTINUM    Post-Operative Day: 4 Days Post-Op    Objective:     Vital Signs (Most Recent):  Temp: 99.68 °F (37.6 °C) (07/04/22 0730)  Pulse: (!) 113 (07/04/22 0730)  Resp: 13 (07/04/22 0741)  BP: (!) 88/0 (07/04/22 0730)  SpO2: (!) 93 % (07/04/22 0730)   Vital Signs (24h Range):  Temp:  [98.24 °F (36.8 °C)-100.04 °F (37.8 °C)] 99.68 °F (37.6 °C)  Pulse:  [103-117] 113  Resp:  [11-43] 13  SpO2:  [80 %-99 %] 93 %  BP: (88-98)/(0) 88/0  Arterial Line BP: (81-99)/(60-85) 90/77     Weight: 93.7 kg (206 lb 9.1 oz)  Body mass index is 25.82 kg/m².      Intake/Output Summary (Last 24 hours) at 7/4/2022 0813  Last data filed at 7/4/2022 0701  Gross per 24 hour   Intake 3948.4 ml   Output 6690 ml   Net -2741.6 ml       Physical Exam  Vitals reviewed.   Constitutional:       General: He is not in acute distress.  HENT:      Head: Normocephalic and atraumatic.      Nose: Nose normal.      Mouth/Throat:      Mouth: Mucous membranes are moist.   Eyes:      Pupils: Pupils are equal, round, and reactive to light.   Neck:      Comments:     Cardiovascular:      Rate and Rhythm: Regular rhythm. Tachycardia present.      Pulses: Normal pulses.      Comments: S/p chest closure. Incision c/d/I.        Pulmonary:      Effort: Pulmonary effort is normal. No respiratory distress.      Comments: On 4L NC  Abdominal:      General: Abdomen is flat. There is no distension.      Palpations: Abdomen is soft.   Genitourinary:     Comments: melendez  Musculoskeletal:      Comments: IABP site at left fem with dressing in place   Skin:     General: Skin is warm and dry.       Capillary Refill: Capillary refill takes less than 2 seconds.   Neurological:      General: No focal deficit present.      Mental Status: He is alert and oriented to person, place, and time.       Vents:  Vent Mode: Spont (07/01/22 1205)  Ventilator Initiated: Yes (06/29/22 1527)  Set Rate: 18 BPM (07/01/22 0737)  Vt Set: 580 mL (07/01/22 0737)  Pressure Support: 8 cmH20 (07/01/22 1205)  PEEP/CPAP: 5 cmH20 (07/01/22 1205)  Oxygen Concentration (%): 36 (07/04/22 0337)  Peak Airway Pressure: 15 cmH2O (07/01/22 1205)  Plateau Pressure: 21 cmH20 (07/01/22 1205)  Total Ve: 17 mL (07/01/22 1205)  Negative Inspiratory Force (cm H2O): -13 (07/01/22 1205)  F/VT Ratio<105 (RSBI): (!) 70.48 (07/01/22 1205)    Lines/Drains/Airways       Central Venous Catheter Line  Duration              Introducer with Double Lumen 06/29/22 1123 4 days    Percutaneous Central Line Insertion/Assessment - Triple Lumen  06/29/22 1200 left internal jugular 4 days              Drain  Duration                  Urethral Catheter 06/29/22 0840 Straight-tip;Temperature probe;Non-latex 14 Fr. 4 days              Arterial Line  Duration             Arterial Line 06/29/22 0832 Left Brachial 4 days              Line  Duration                  VAD 06/29/22 1115 Left ventricular assist device HeartMate 3 4 days              Peripheral Intravenous Line  Duration                  Peripheral IV - Single Lumen 07/02/22 2226 20 G Anterior;Distal;Left Forearm 1 day                    Significant Labs:    CBC/Anemia Profile:  Recent Labs   Lab 07/02/22  1200 07/03/22  0352 07/04/22  0301   WBC 24.96* 24.35* 24.46*   HGB 8.5* 8.1* 7.9*   HCT 25.2* 24.4* 24.4*    200 214   MCV 86 85 88   RDW 15.1* 15.2* 15.3*   IRON  --  16*  --    FERRITIN  --  265  --         Chemistries:  Recent Labs   Lab 07/03/22  0352 07/03/22  0848 07/03/22  2040 07/03/22  2330 07/04/22  0301      < > 137 136 137   K 4.0   < > 3.1* 3.5 3.7      < > 98 95 94*   CO2 23   < >  24 27 26   BUN 55*   < > 50* 51* 51*   CREATININE 1.5*   < > 1.5* 1.5* 1.5*   CALCIUM 9.4   < > 9.0 9.5 9.3   ALBUMIN 3.2*   < > 3.2* 3.3* 3.4*  3.4*   PROT 7.7   < > 6.9 7.3 7.1  7.1   BILITOT 1.8*   < > 1.5* 1.6* 1.8*  1.8*   ALKPHOS 112   < > 112 114 117  117   *   < > 722* 740* 658*  658*   *   < > 386* 369* 310*  310*   MG 2.7*  --  2.5  --  2.4   PHOS 2.1*  --  2.8  --  3.7    < > = values in this interval not displayed.       All pertinent labs within the past 24 hours have been reviewed.    Significant Imaging:  I have reviewed all pertinent imaging results/findings within the past 24 hours.

## 2022-07-04 NOTE — PT/OT/SLP PROGRESS
"Occupational Therapy   Treatment    Name: Kevan Queen  MRN: [id number]  Admitting Diagnosis:  Acute on chronic combined systolic and diastolic congestive heart failure, NYHA class 4  4 Days Post-Op    Recommendations:     Discharge Recommendations: rehabilitation facility      Assessment:     Kevan Queen is a 37 y.o. male with a medical diagnosis of Acute on chronic combined systolic and diastolic congestive heart failure, NYHA class 4.   Performance deficits affecting function are weakness, impaired endurance, impaired self care skills, impaired functional mobilty, gait instability, impaired balance. Pt tolerated session fairly well. Pt is very motivated despite c/o of pain throughout session.  Pt may benefit from post acute therapy pending progress with therapy.    Rehab Prognosis:  Good; patient would benefit from acute skilled OT services to address these deficits and reach maximum level of function.       Plan:     Patient to be seen 6 x/week to address the above listed problems via self-care/home management, therapeutic activities, therapeutic exercises  · Plan of Care Expires: 08/02/22  · Plan of Care Reviewed with: patient    Subjective   "It hurts, but I know I gotta do it" pt states.     Pain/Comfort:  · Pain Rating 1: 10/10  · Location - Orientation 1: medial  · Location 1: chest  · Pain Addressed 1: Reposition, Distraction    Objective:     Communicated with: nsg prior to session.  Patient found supine in bed with wound vac, tele, pulse ox, BP cuff, central line, A-line, melendez and LVAD to wall power.   Cotx completed this date to optimize functional performance and safety given impaired tolerance for activity in setting of ICU  General Precautions: Standard, fall, LVAD, sternal     Occupational Performance:     Bed Mobility:    Supine>sit with MAX A x 2     Functional Mobility/Transfers:  MOD A sit>stand.  MIN A stand pivot.   Few sides steps with MIN A x 2.     Activities of Daily Living:  · Pt " "reports assist needed this AM for oral care. Pt reports and demo impaired functional use of left dominant UE due to "frozen shoulder".   · UE/LE dressing: TOTAL A       AMPAC 6 Click ADL: 14    Treatment & Education:  Pt verb 2/3 sternal precautions. Full education provided re: sternal precautions and implications on functional activity.   Basic education provided re: LVAD item identification and importance of controller securement prior to mobility.   Pt demo Fair + sitting balance. Pt with flat affect and often closing eyes during activity. Pt indicates increased pain and denies dizziness/lightheadedness.   Pt able to stand and t/f to chair with vital signs stable and LVAD numbers stable.     Education provided re: OT POC and safety with functional mobility/ADL skills.     Patient left up in chair with all lines intact, call button in reach and nsg notifiedEducation:      GOALS:   Multidisciplinary Problems     Occupational Therapy Goals        Problem: Occupational Therapy    Goal Priority Disciplines Outcome Interventions   Occupational Therapy Goal     OT, PT/OT Ongoing, Progressing    Description: Goals to be met by: 8/2/22     Patient will increase functional independence with ADLs by performing:    UE Dressing with Modified [place].  LE Dressing with Modified [place] and Assistive Devices as needed.  Grooming while standing at sink with Modified [place].  Toileting from toilet with Modified [place] for hygiene and clothing management.   Bathing from  shower chair/bench with Modified [place].  Toilet transfer to toilet with Modified [place].  Increased functional strength to WFL for B UE.  Upper extremity exercise program x15 reps per handout, with assistance as needed.  Pt will be I in all LVAD management.                     Time Tracking:     OT Date of Treatment: 07/04/22  OT Start Time: 1348  OT Stop Time: 1408  OT Total Time (min): 20 min    Billable Minutes:Therapeutic " Activity 20    OT/MARTÍNEZ: OT          7/4/2022

## 2022-07-04 NOTE — PROGRESS NOTES
Dr. Flores and Dr. Paige notified of vitals, most recent CVP 24, gtt rates, labs and UOP. UOP adequate at this time and lasix to be continued at current rate per Dr. Paige. OK to replace potassium IV with 80 meq. Orders to give 40 meq K oral. Orders to decrease cardene to 5mg/hr and administer 10mg hydralazine if hypertensive.

## 2022-07-04 NOTE — TREATMENT PLAN
Hepatology Treatment Plan    Kevan Queen is a 37 y.o. male admitted to hospital 6/13/2022 (Hospital Day: 22) due to Acute on chronic combined systolic and diastolic congestive heart failure, NYHA class 4.     Interval History  Patient remains in SICU. Vital signs stable on nasal cannula and he is remains on Dobutamine and Epinephrine. Patient denies abdominal pain, nausea, or vomiting. Nursing team deny noticing confusion. Labs notable for on-going leukocytosis (24), stable normocytic anemia (Hgb 7.9), normal INR, and improving transaminitis (, ).     Objective  Temp:  [99.14 °F (37.3 °C)-100.04 °F (37.8 °C)] 99.86 °F (37.7 °C) (07/04 1100)  Pulse:  [103-117] 114 (07/04 1100)  BP: (88-98)/(0) 90/0 (07/04 1100)  Resp:  [12-43] 15 (07/04 1100)  SpO2:  [80 %-99 %] 96 % (07/04 1100)  Arterial Line BP: (81-98)/(60-85) 92/79 (07/04 1100)    General: Alert, Oriented x3, no distress  Neurologic: Asterixis absent  Abdomen: Large dressing in place over entire abdominal wall. Normoactive bowel sounds. Non-distended. Normal tympany. Soft. Non-tender. No peritoneal signs.    Laboratory    Lab Results   Component Value Date    WBC 24.46 (H) 07/04/2022    HGB 7.9 (L) 07/04/2022    HCT 24.4 (L) 07/04/2022    MCV 88 07/04/2022     07/04/2022       Lab Results   Component Value Date     (L) 07/04/2022    K 4.2 07/04/2022    CL 95 07/04/2022    CO2 25 07/04/2022    BUN 49 (H) 07/04/2022    CREATININE 1.5 (H) 07/04/2022    CALCIUM 9.0 07/04/2022       Lab Results   Component Value Date    ALBUMIN 3.2 (L) 07/04/2022     (H) 07/04/2022     (H) 07/04/2022    ALKPHOS 112 07/04/2022    BILITOT 1.8 (H) 07/04/2022       Lab Results   Component Value Date    INR 1.2 07/04/2022    INR 1.1 07/03/2022    INR 1.1 07/02/2022       MELD-Na score: 17 at 7/4/2022  7:45 AM  MELD score: 15 at 7/4/2022  7:45 AM  Calculated from:  Serum Creatinine: 1.5 mg/dL at 7/4/2022  7:45 AM  Serum Sodium: 135 mmol/L at  7/4/2022  7:45 AM  Total Bilirubin: 1.8 mg/dL at 7/4/2022  7:45 AM  INR(ratio): 1.2 at 7/4/2022  3:01 AM  Age: 37 years    Assessment  This is a 37-year-old male with a PMH significant for non-ischemic cardiomyopathy with combined CHF (on home Milrinone and status post ICD placement), CKD III, and tobacco abuse who was admitted to [facility] on 06/13/2022 for management of cardiogenic shock requiring IABP placement. Hospital course associated with onset of sepsis secondary to S.epidermidis bacteremia on 06/20. He is now status post LVAD placement on 06/29 with post-op course notable for onset of sepsis and transaminitis in hepatocellular pattern. He currently does not meet criteria for acute liver failure given absence of encephalopathy and INR <1.4. Work-up for underlying etiology is without evidence of Jose's disease, biliary or hepatic vasculature abnormalities. Etiology of liver enzyme abnormality is likely secondary to severe cardiomyopathy and critical illness.      Recommendations   - Follow-up hepatitis panel and auto-immune markers.   - Minimize use of medications that may precipitate encephalopathy (e.g. opiates and benzo's).   - Continued work-up and management of sepsis.   - Maintain normal hemodynamics.   - Okay to continue NAC, if desired.   - Obtain CMP and INR daily.     Thank you for involving us in the care of Kevan Queen. Please call with any additional concerns or questions.        Marcos Bunch MD, PGY-V  Gastroenterology Fellow  [facility]

## 2022-07-05 ENCOUNTER — RESEARCH ENCOUNTER (OUTPATIENT)
Dept: RESEARCH | Facility: HOSPITAL | Age: 37
End: 2022-07-05
Payer: MEDICAID

## 2022-07-05 ENCOUNTER — DOCUMENTATION ONLY (OUTPATIENT)
Dept: TRANSPLANT | Facility: CLINIC | Age: 37
End: 2022-07-05
Payer: MEDICAID

## 2022-07-05 LAB
ALBUMIN SERPL BCP-MCNC: 3.1 G/DL (ref 3.5–5.2)
ALBUMIN SERPL BCP-MCNC: 3.1 G/DL (ref 3.5–5.2)
ALBUMIN SERPL BCP-MCNC: 3.2 G/DL (ref 3.5–5.2)
ALLENS TEST: ABNORMAL
ALLENS TEST: ABNORMAL
ALP SERPL-CCNC: 119 U/L (ref 55–135)
ALP SERPL-CCNC: 122 U/L (ref 55–135)
ALP SERPL-CCNC: 125 U/L (ref 55–135)
ALT SERPL W/O P-5'-P-CCNC: 392 U/L (ref 10–44)
ALT SERPL W/O P-5'-P-CCNC: 439 U/L (ref 10–44)
ALT SERPL W/O P-5'-P-CCNC: 477 U/L (ref 10–44)
ANA SER QL IF: NORMAL
ANION GAP SERPL CALC-SCNC: 14 MMOL/L (ref 8–16)
ANION GAP SERPL CALC-SCNC: 15 MMOL/L (ref 8–16)
ANION GAP SERPL CALC-SCNC: 16 MMOL/L (ref 8–16)
APTT BLDCRRT: 40.2 SEC (ref 21–32)
APTT BLDCRRT: 45.9 SEC (ref 21–32)
APTT BLDCRRT: 46.7 SEC (ref 21–32)
APTT BLDCRRT: 59.2 SEC (ref 21–32)
ASCENDING AORTA: 3.25 CM
AST SERPL-CCNC: 114 U/L (ref 10–40)
AST SERPL-CCNC: 134 U/L (ref 10–40)
AST SERPL-CCNC: 154 U/L (ref 10–40)
BACTERIA BLD CULT: NORMAL
BACTERIA BLD CULT: NORMAL
BASOPHILS # BLD AUTO: 0.04 K/UL (ref 0–0.2)
BASOPHILS NFR BLD: 0.2 % (ref 0–1.9)
BILIRUB SERPL-MCNC: 1.7 MG/DL (ref 0.1–1)
BILIRUB SERPL-MCNC: 1.8 MG/DL (ref 0.1–1)
BILIRUB SERPL-MCNC: 1.8 MG/DL (ref 0.1–1)
BSA FOR ECHO PROCEDURE: 2.23 M2
BUN SERPL-MCNC: 41 MG/DL (ref 6–20)
BUN SERPL-MCNC: 42 MG/DL (ref 6–20)
BUN SERPL-MCNC: 43 MG/DL (ref 6–20)
CALCIUM SERPL-MCNC: 8.9 MG/DL (ref 8.7–10.5)
CALCIUM SERPL-MCNC: 9.2 MG/DL (ref 8.7–10.5)
CALCIUM SERPL-MCNC: 9.2 MG/DL (ref 8.7–10.5)
CHLORIDE SERPL-SCNC: 86 MMOL/L (ref 95–110)
CHLORIDE SERPL-SCNC: 87 MMOL/L (ref 95–110)
CHLORIDE SERPL-SCNC: 88 MMOL/L (ref 95–110)
CO2 SERPL-SCNC: 27 MMOL/L (ref 23–29)
CO2 SERPL-SCNC: 30 MMOL/L (ref 23–29)
CO2 SERPL-SCNC: 30 MMOL/L (ref 23–29)
CREAT SERPL-MCNC: 1.4 MG/DL (ref 0.5–1.4)
CREAT SERPL-MCNC: 1.5 MG/DL (ref 0.5–1.4)
CREAT SERPL-MCNC: 1.5 MG/DL (ref 0.5–1.4)
CV ECHO LV RWT: 0.21 CM
DELSYS: ABNORMAL
DELSYS: ABNORMAL
DIFFERENTIAL METHOD: ABNORMAL
DOP CALC LVOT AREA: 3.8 CM2
DOP CALC LVOT DIAMETER: 2.2 CM
DOP CALC MV VTI: 16.86 CM
E WAVE DECELERATION TIME: 161.25 MSEC
E/A RATIO: 1.16
E/E' RATIO: 16.25 M/S
ECHO LV POSTERIOR WALL: 0.75 CM (ref 0.6–1.1)
EJECTION FRACTION: 10 %
EOSINOPHIL # BLD AUTO: 0 K/UL (ref 0–0.5)
EOSINOPHIL NFR BLD: 0.2 % (ref 0–8)
ERYTHROCYTE [DISTWIDTH] IN BLOOD BY AUTOMATED COUNT: 15.4 % (ref 11.5–14.5)
EST. GFR  (AFRICAN AMERICAN): >60 ML/MIN/1.73 M^2
EST. GFR  (NON AFRICAN AMERICAN): 58.6 ML/MIN/1.73 M^2
EST. GFR  (NON AFRICAN AMERICAN): 58.6 ML/MIN/1.73 M^2
EST. GFR  (NON AFRICAN AMERICAN): >60 ML/MIN/1.73 M^2
FIBRINOGEN PPP-MCNC: 790 MG/DL (ref 182–400)
FIO2: 36
FLOW: 4
FLOW: 4
FRACTIONAL SHORTENING: 10 % (ref 28–44)
GLUCOSE SERPL-MCNC: 171 MG/DL (ref 70–110)
GLUCOSE SERPL-MCNC: 175 MG/DL (ref 70–110)
GLUCOSE SERPL-MCNC: 206 MG/DL (ref 70–110)
HAV IGM SERPL QL IA: NEGATIVE
HBV CORE IGM SERPL QL IA: NEGATIVE
HBV SURFACE AG SERPL QL IA: NEGATIVE
HCO3 UR-SCNC: 27.1 MMOL/L (ref 24–28)
HCO3 UR-SCNC: 35.6 MMOL/L (ref 24–28)
HCT VFR BLD AUTO: 23.8 % (ref 40–54)
HCV AB SERPL QL IA: NEGATIVE
HGB BLD-MCNC: 7.8 G/DL (ref 14–18)
HIV 1+2 AB+HIV1 P24 AG SERPL QL IA: NEGATIVE
IMM GRANULOCYTES # BLD AUTO: 0.16 K/UL (ref 0–0.04)
IMM GRANULOCYTES NFR BLD AUTO: 0.7 % (ref 0–0.5)
INR PPP: 1.4 (ref 0.8–1.2)
INTERVENTRICULAR SEPTUM: 0.85 CM (ref 0.6–1.1)
LDH SERPL L TO P-CCNC: 686 U/L (ref 110–260)
LEFT ATRIUM SIZE: 2.32 CM
LEFT INTERNAL DIMENSION IN SYSTOLE: 6.41 CM (ref 2.1–4)
LEFT VENTRICLE DIASTOLIC VOLUME INDEX: 119.95 ML/M2
LEFT VENTRICLE DIASTOLIC VOLUME: 266.3 ML
LEFT VENTRICLE MASS INDEX: 114 G/M2
LEFT VENTRICLE SYSTOLIC VOLUME INDEX: 94.2 ML/M2
LEFT VENTRICLE SYSTOLIC VOLUME: 209.2 ML
LEFT VENTRICULAR INTERNAL DIMENSION IN DIASTOLE: 7.13 CM (ref 3.5–6)
LEFT VENTRICULAR MASS: 252.59 G
LV LATERAL E/E' RATIO: 16.25 M/S
LV SEPTAL E/E' RATIO: 16.25 M/S
LYMPHOCYTES # BLD AUTO: 1.7 K/UL (ref 1–4.8)
LYMPHOCYTES NFR BLD: 7.7 % (ref 18–48)
MAGNESIUM SERPL-MCNC: 2.4 MG/DL (ref 1.6–2.6)
MAGNESIUM SERPL-MCNC: 2.7 MG/DL (ref 1.6–2.6)
MCH RBC QN AUTO: 28.7 PG (ref 27–31)
MCHC RBC AUTO-ENTMCNC: 32.8 G/DL (ref 32–36)
MCV RBC AUTO: 88 FL (ref 82–98)
METHEMOGLOBIN: 0.2 % (ref 0–3)
MITOCHONDRIA AB TITR SER IF: NORMAL {TITER}
MODE: ABNORMAL
MODE: ABNORMAL
MONOCYTES # BLD AUTO: 1.9 K/UL (ref 0.3–1)
MONOCYTES NFR BLD: 8.6 % (ref 4–15)
MV MEAN GRADIENT: 1 MMHG
MV PEAK A VEL: 0.56 M/S
MV PEAK E VEL: 0.65 M/S
MV PEAK GRADIENT: 3 MMHG
MV STENOSIS PRESSURE HALF TIME: 46.76 MS
MV VALVE AREA P 1/2 METHOD: 4.7 CM2
NEUTROPHILS # BLD AUTO: 18.2 K/UL (ref 1.8–7.7)
NEUTROPHILS NFR BLD: 82.6 % (ref 38–73)
NRBC BLD-RTO: 4 /100 WBC
PCO2 BLDA: 37.9 MMHG (ref 35–45)
PCO2 BLDA: 44.4 MMHG (ref 35–45)
PH SMN: 7.46 [PH] (ref 7.35–7.45)
PH SMN: 7.51 [PH] (ref 7.35–7.45)
PHOSPHATE SERPL-MCNC: 3.1 MG/DL (ref 2.7–4.5)
PISA TR MAX VEL: 2.2 M/S
PLATELET # BLD AUTO: 216 K/UL (ref 150–450)
PMV BLD AUTO: 10.7 FL (ref 9.2–12.9)
PO2 BLDA: 108 MMHG (ref 80–100)
PO2 BLDA: 77 MMHG (ref 80–100)
POC BE: 13 MMOL/L
POC BE: 3 MMOL/L
POC SATURATED O2: 96 % (ref 95–100)
POC SATURATED O2: 98 % (ref 95–100)
POC TCO2: 28 MMOL/L (ref 23–27)
POC TCO2: 37 MMOL/L (ref 23–27)
POCT GLUCOSE: 104 MG/DL (ref 70–110)
POCT GLUCOSE: 117 MG/DL (ref 70–110)
POCT GLUCOSE: 201 MG/DL (ref 70–110)
POCT GLUCOSE: 201 MG/DL (ref 70–110)
POCT GLUCOSE: 228 MG/DL (ref 70–110)
POCT GLUCOSE: 246 MG/DL (ref 70–110)
POCT GLUCOSE: 98 MG/DL (ref 70–110)
POTASSIUM SERPL-SCNC: 3.4 MMOL/L (ref 3.5–5.1)
POTASSIUM SERPL-SCNC: 3.6 MMOL/L (ref 3.5–5.1)
POTASSIUM SERPL-SCNC: 3.8 MMOL/L (ref 3.5–5.1)
POTASSIUM SERPL-SCNC: 3.9 MMOL/L (ref 3.5–5.1)
POTASSIUM SERPL-SCNC: 3.9 MMOL/L (ref 3.5–5.1)
PROT SERPL-MCNC: 6.8 G/DL (ref 6–8.4)
PROT SERPL-MCNC: 6.9 G/DL (ref 6–8.4)
PROT SERPL-MCNC: 7 G/DL (ref 6–8.4)
PROTHROMBIN TIME: 14 SEC (ref 9–12.5)
RBC # BLD AUTO: 2.72 M/UL (ref 4.6–6.2)
SAMPLE: ABNORMAL
SAMPLE: ABNORMAL
SINUS: 3.26 CM
SITE: ABNORMAL
SITE: ABNORMAL
SMOOTH MUSCLE AB TITR SER IF: NORMAL {TITER}
SODIUM SERPL-SCNC: 131 MMOL/L (ref 136–145)
STJ: 2.4 CM
TDI LATERAL: 0.04 M/S
TDI SEPTAL: 0.04 M/S
TDI: 0.04 M/S
TR MAX PG: 19 MMHG
WBC # BLD AUTO: 22.05 K/UL (ref 3.9–12.7)

## 2022-07-05 PROCEDURE — 84132 ASSAY OF SERUM POTASSIUM: CPT | Mod: 91

## 2022-07-05 PROCEDURE — 84100 ASSAY OF PHOSPHORUS: CPT | Performed by: STUDENT IN AN ORGANIZED HEALTH CARE EDUCATION/TRAINING PROGRAM

## 2022-07-05 PROCEDURE — 63600175 PHARM REV CODE 636 W HCPCS

## 2022-07-05 PROCEDURE — 93010 EKG 12-LEAD: ICD-10-PCS | Mod: ,,, | Performed by: INTERNAL MEDICINE

## 2022-07-05 PROCEDURE — 83050 HGB METHEMOGLOBIN QUAN: CPT

## 2022-07-05 PROCEDURE — 83735 ASSAY OF MAGNESIUM: CPT | Performed by: STUDENT IN AN ORGANIZED HEALTH CARE EDUCATION/TRAINING PROGRAM

## 2022-07-05 PROCEDURE — 25000003 PHARM REV CODE 250: Performed by: STUDENT IN AN ORGANIZED HEALTH CARE EDUCATION/TRAINING PROGRAM

## 2022-07-05 PROCEDURE — 63600175 PHARM REV CODE 636 W HCPCS: Performed by: STUDENT IN AN ORGANIZED HEALTH CARE EDUCATION/TRAINING PROGRAM

## 2022-07-05 PROCEDURE — 85025 COMPLETE CBC W/AUTO DIFF WBC: CPT | Performed by: STUDENT IN AN ORGANIZED HEALTH CARE EDUCATION/TRAINING PROGRAM

## 2022-07-05 PROCEDURE — 93010 ELECTROCARDIOGRAM REPORT: CPT | Mod: ,,, | Performed by: INTERNAL MEDICINE

## 2022-07-05 PROCEDURE — 82803 BLOOD GASES ANY COMBINATION: CPT

## 2022-07-05 PROCEDURE — 85384 FIBRINOGEN ACTIVITY: CPT | Performed by: STUDENT IN AN ORGANIZED HEALTH CARE EDUCATION/TRAINING PROGRAM

## 2022-07-05 PROCEDURE — 85730 THROMBOPLASTIN TIME PARTIAL: CPT | Mod: 91

## 2022-07-05 PROCEDURE — 99291 PR CRITICAL CARE, E/M 30-74 MINUTES: ICD-10-PCS | Mod: 25,,, | Performed by: INTERNAL MEDICINE

## 2022-07-05 PROCEDURE — 63600175 PHARM REV CODE 636 W HCPCS: Performed by: INTERNAL MEDICINE

## 2022-07-05 PROCEDURE — 83615 LACTATE (LD) (LDH) ENZYME: CPT | Performed by: THORACIC SURGERY (CARDIOTHORACIC VASCULAR SURGERY)

## 2022-07-05 PROCEDURE — 85730 THROMBOPLASTIN TIME PARTIAL: CPT | Performed by: STUDENT IN AN ORGANIZED HEALTH CARE EDUCATION/TRAINING PROGRAM

## 2022-07-05 PROCEDURE — 27000248 HC VAD-ADDITIONAL DAY

## 2022-07-05 PROCEDURE — 63600367 HC NITRIC OXIDE PER HOUR

## 2022-07-05 PROCEDURE — 25000003 PHARM REV CODE 250: Performed by: INTERNAL MEDICINE

## 2022-07-05 PROCEDURE — 99900035 HC TECH TIME PER 15 MIN (STAT)

## 2022-07-05 PROCEDURE — 83735 ASSAY OF MAGNESIUM: CPT | Mod: 91

## 2022-07-05 PROCEDURE — 99291 CRITICAL CARE FIRST HOUR: CPT | Mod: 25,,, | Performed by: INTERNAL MEDICINE

## 2022-07-05 PROCEDURE — 94799 UNLISTED PULMONARY SVC/PX: CPT

## 2022-07-05 PROCEDURE — 99232 SBSQ HOSP IP/OBS MODERATE 35: CPT | Mod: ,,, | Performed by: NURSE PRACTITIONER

## 2022-07-05 PROCEDURE — 85610 PROTHROMBIN TIME: CPT | Performed by: STUDENT IN AN ORGANIZED HEALTH CARE EDUCATION/TRAINING PROGRAM

## 2022-07-05 PROCEDURE — A4216 STERILE WATER/SALINE, 10 ML: HCPCS | Performed by: THORACIC SURGERY (CARDIOTHORACIC VASCULAR SURGERY)

## 2022-07-05 PROCEDURE — 25000003 PHARM REV CODE 250: Performed by: THORACIC SURGERY (CARDIOTHORACIC VASCULAR SURGERY)

## 2022-07-05 PROCEDURE — 25000003 PHARM REV CODE 250

## 2022-07-05 PROCEDURE — 76937 US GUIDE VASCULAR ACCESS: CPT

## 2022-07-05 PROCEDURE — 37799 UNLISTED PX VASCULAR SURGERY: CPT

## 2022-07-05 PROCEDURE — 27000221 HC OXYGEN, UP TO 24 HOURS

## 2022-07-05 PROCEDURE — 99232 PR SUBSEQUENT HOSPITAL CARE,LEVL II: ICD-10-PCS | Mod: ,,, | Performed by: NURSE PRACTITIONER

## 2022-07-05 PROCEDURE — 99291 CRITICAL CARE FIRST HOUR: CPT | Mod: ,,, | Performed by: ANESTHESIOLOGY

## 2022-07-05 PROCEDURE — 99292 PR CRITICAL CARE, ADDL 30 MIN: ICD-10-PCS | Mod: ,,, | Performed by: INTERNAL MEDICINE

## 2022-07-05 PROCEDURE — 25000003 PHARM REV CODE 250: Performed by: ANESTHESIOLOGY

## 2022-07-05 PROCEDURE — 92526 ORAL FUNCTION THERAPY: CPT

## 2022-07-05 PROCEDURE — 93750 PR INTERROGATE VENT ASSIST DEV, IN PERSON, W PHYSICIAN ANALYSIS: ICD-10-PCS | Mod: ,,, | Performed by: INTERNAL MEDICINE

## 2022-07-05 PROCEDURE — 94761 N-INVAS EAR/PLS OXIMETRY MLT: CPT

## 2022-07-05 PROCEDURE — 93750 INTERROGATION VAD IN PERSON: CPT | Mod: ,,, | Performed by: INTERNAL MEDICINE

## 2022-07-05 PROCEDURE — 99292 CRITICAL CARE ADDL 30 MIN: CPT | Mod: ,,, | Performed by: INTERNAL MEDICINE

## 2022-07-05 PROCEDURE — 80053 COMPREHEN METABOLIC PANEL: CPT | Mod: 91 | Performed by: THORACIC SURGERY (CARDIOTHORACIC VASCULAR SURGERY)

## 2022-07-05 PROCEDURE — 99291 PR CRITICAL CARE, E/M 30-74 MINUTES: ICD-10-PCS | Mod: ,,, | Performed by: ANESTHESIOLOGY

## 2022-07-05 PROCEDURE — 36573 INSJ PICC RS&I 5 YR+: CPT

## 2022-07-05 PROCEDURE — 20000000 HC ICU ROOM

## 2022-07-05 PROCEDURE — C1751 CATH, INF, PER/CENT/MIDLINE: HCPCS

## 2022-07-05 PROCEDURE — 93005 ELECTROCARDIOGRAM TRACING: CPT

## 2022-07-05 RX ORDER — ONDANSETRON 4 MG/1
4 TABLET, ORALLY DISINTEGRATING ORAL ONCE
Status: COMPLETED | OUTPATIENT
Start: 2022-07-05 | End: 2022-07-05

## 2022-07-05 RX ORDER — SODIUM CHLORIDE 0.9 % (FLUSH) 0.9 %
10 SYRINGE (ML) INJECTION
Status: DISCONTINUED | OUTPATIENT
Start: 2022-07-05 | End: 2022-07-28 | Stop reason: HOSPADM

## 2022-07-05 RX ORDER — SODIUM CHLORIDE 0.9 % (FLUSH) 0.9 %
10 SYRINGE (ML) INJECTION EVERY 6 HOURS
Status: DISCONTINUED | OUTPATIENT
Start: 2022-07-05 | End: 2022-07-28 | Stop reason: HOSPADM

## 2022-07-05 RX ORDER — SYRING-NEEDL,DISP,INSUL,0.3 ML 29 G X1/2"
148 SYRINGE, EMPTY DISPOSABLE MISCELLANEOUS ONCE
Status: COMPLETED | OUTPATIENT
Start: 2022-07-05 | End: 2022-07-05

## 2022-07-05 RX ORDER — TRAMADOL HYDROCHLORIDE 50 MG/1
50 TABLET ORAL EVERY 6 HOURS PRN
Status: DISCONTINUED | OUTPATIENT
Start: 2022-07-05 | End: 2022-07-12

## 2022-07-05 RX ORDER — ACETAZOLAMIDE 250 MG/1
250 TABLET ORAL ONCE
Status: COMPLETED | OUTPATIENT
Start: 2022-07-05 | End: 2022-07-05

## 2022-07-05 RX ORDER — INSULIN ASPART 100 [IU]/ML
12 INJECTION, SOLUTION INTRAVENOUS; SUBCUTANEOUS
Status: DISCONTINUED | OUTPATIENT
Start: 2022-07-05 | End: 2022-07-07

## 2022-07-05 RX ORDER — TIZANIDINE 2 MG/1
2 TABLET ORAL EVERY 8 HOURS
Status: DISCONTINUED | OUTPATIENT
Start: 2022-07-05 | End: 2022-07-28 | Stop reason: HOSPADM

## 2022-07-05 RX ORDER — LIDOCAINE 50 MG/G
1 PATCH TOPICAL
Status: DISCONTINUED | OUTPATIENT
Start: 2022-07-05 | End: 2022-07-17

## 2022-07-05 RX ORDER — FUROSEMIDE 10 MG/ML
40 INJECTION INTRAMUSCULAR; INTRAVENOUS ONCE
Status: COMPLETED | OUTPATIENT
Start: 2022-07-05 | End: 2022-07-05

## 2022-07-05 RX ADMIN — Medication 324 MG: at 07:07

## 2022-07-05 RX ADMIN — INSULIN ASPART 2 UNITS: 100 INJECTION, SOLUTION INTRAVENOUS; SUBCUTANEOUS at 03:07

## 2022-07-05 RX ADMIN — WARFARIN SODIUM 6 MG: 5 TABLET ORAL at 04:07

## 2022-07-05 RX ADMIN — FUROSEMIDE 40 MG: 10 INJECTION, SOLUTION INTRAMUSCULAR; INTRAVENOUS at 06:07

## 2022-07-05 RX ADMIN — TRAMADOL HYDROCHLORIDE 50 MG: 50 TABLET, COATED ORAL at 06:07

## 2022-07-05 RX ADMIN — TIZANIDINE 2 MG: 2 TABLET ORAL at 01:07

## 2022-07-05 RX ADMIN — POTASSIUM BICARBONATE 40 MEQ: 391 TABLET, EFFERVESCENT ORAL at 04:07

## 2022-07-05 RX ADMIN — INSULIN ASPART 2 UNITS: 100 INJECTION, SOLUTION INTRAVENOUS; SUBCUTANEOUS at 07:07

## 2022-07-05 RX ADMIN — DAPTOMYCIN 750 MG: 350 INJECTION, POWDER, LYOPHILIZED, FOR SOLUTION INTRAVENOUS at 11:07

## 2022-07-05 RX ADMIN — EPINEPHRINE 0.02 MCG/KG/MIN: 1 INJECTION INTRAMUSCULAR; INTRAVENOUS; SUBCUTANEOUS at 11:07

## 2022-07-05 RX ADMIN — HYDROMORPHONE HYDROCHLORIDE 0.2 MG: 1 INJECTION, SOLUTION INTRAMUSCULAR; INTRAVENOUS; SUBCUTANEOUS at 05:07

## 2022-07-05 RX ADMIN — ACETYLCYSTEINE 6.25 MG/KG/HR: 200 INJECTION, SOLUTION INTRAVENOUS at 07:07

## 2022-07-05 RX ADMIN — LIDOCAINE 1 PATCH: 50 PATCH CUTANEOUS at 10:07

## 2022-07-05 RX ADMIN — ACETAZOLAMIDE 250 MG: 250 TABLET ORAL at 04:07

## 2022-07-05 RX ADMIN — POTASSIUM CHLORIDE 40 MEQ: 29.8 INJECTION, SOLUTION INTRAVENOUS at 06:07

## 2022-07-05 RX ADMIN — MAGNESIUM CITRATE 148 ML: 1.75 LIQUID ORAL at 12:07

## 2022-07-05 RX ADMIN — INSULIN ASPART 10 UNITS: 100 INJECTION, SOLUTION INTRAVENOUS; SUBCUTANEOUS at 11:07

## 2022-07-05 RX ADMIN — INSULIN ASPART 10 UNITS: 100 INJECTION, SOLUTION INTRAVENOUS; SUBCUTANEOUS at 07:07

## 2022-07-05 RX ADMIN — POTASSIUM CHLORIDE 20 MEQ: 14.9 INJECTION, SOLUTION INTRAVENOUS at 10:07

## 2022-07-05 RX ADMIN — INSULIN ASPART 12 UNITS: 100 INJECTION, SOLUTION INTRAVENOUS; SUBCUTANEOUS at 05:07

## 2022-07-05 RX ADMIN — INSULIN ASPART 2 UNITS: 100 INJECTION, SOLUTION INTRAVENOUS; SUBCUTANEOUS at 11:07

## 2022-07-05 RX ADMIN — HEPARIN SODIUM 1700 UNITS/HR: 5000 INJECTION INTRAVENOUS; SUBCUTANEOUS at 11:07

## 2022-07-05 RX ADMIN — OXYCODONE HYDROCHLORIDE 10 MG: 10 TABLET ORAL at 07:07

## 2022-07-05 RX ADMIN — NICARDIPINE HYDROCHLORIDE 5 MG/HR: 0.2 INJECTION, SOLUTION INTRAVENOUS at 03:07

## 2022-07-05 RX ADMIN — HYDROMORPHONE HYDROCHLORIDE 0.2 MG: 1 INJECTION, SOLUTION INTRAMUSCULAR; INTRAVENOUS; SUBCUTANEOUS at 09:07

## 2022-07-05 RX ADMIN — FUROSEMIDE 15 MG/HR: 10 INJECTION, SOLUTION INTRAMUSCULAR; INTRAVENOUS at 02:07

## 2022-07-05 RX ADMIN — POTASSIUM BICARBONATE 40 MEQ: 391 TABLET, EFFERVESCENT ORAL at 12:07

## 2022-07-05 RX ADMIN — INSULIN HUMAN 3 UNITS/HR: 1 INJECTION, SOLUTION INTRAVENOUS at 03:07

## 2022-07-05 RX ADMIN — Medication 10 ML: at 06:07

## 2022-07-05 RX ADMIN — AMLODIPINE BESYLATE 10 MG: 10 TABLET ORAL at 08:07

## 2022-07-05 RX ADMIN — POTASSIUM BICARBONATE 40 MEQ: 391 TABLET, EFFERVESCENT ORAL at 08:07

## 2022-07-05 RX ADMIN — ONDANSETRON 4 MG: 4 TABLET, ORALLY DISINTEGRATING ORAL at 07:07

## 2022-07-05 RX ADMIN — TRAMADOL HYDROCHLORIDE 50 MG: 50 TABLET, COATED ORAL at 12:07

## 2022-07-05 RX ADMIN — POTASSIUM CHLORIDE 40 MEQ: 29.8 INJECTION, SOLUTION INTRAVENOUS at 10:07

## 2022-07-05 RX ADMIN — POLYETHYLENE GLYCOL 3350 17 G: 17 POWDER, FOR SOLUTION ORAL at 08:07

## 2022-07-05 RX ADMIN — DOBUTAMINE 4 MCG/KG/MIN: 12.5 INJECTION, SOLUTION, CONCENTRATE INTRAVENOUS at 10:07

## 2022-07-05 RX ADMIN — ACETAZOLAMIDE 250 MG: 250 TABLET ORAL at 09:07

## 2022-07-05 RX ADMIN — FUROSEMIDE 15 MG/HR: 10 INJECTION, SOLUTION INTRAMUSCULAR; INTRAVENOUS at 01:07

## 2022-07-05 RX ADMIN — POTASSIUM BICARBONATE 50 MEQ: 978 TABLET, EFFERVESCENT ORAL at 09:07

## 2022-07-05 RX ADMIN — HYDROMORPHONE HYDROCHLORIDE 0.2 MG: 1 INJECTION, SOLUTION INTRAMUSCULAR; INTRAVENOUS; SUBCUTANEOUS at 01:07

## 2022-07-05 RX ADMIN — TIZANIDINE 2 MG: 2 TABLET ORAL at 09:07

## 2022-07-05 RX ADMIN — OXYCODONE HYDROCHLORIDE 10 MG: 10 TABLET ORAL at 01:07

## 2022-07-05 NOTE — PROCEDURES
"Kevan Queen is a 37 y.o. male patient.    Temp: 98.4 °F (36.9 °C) (07/05/22 1130)  Pulse: 104 (07/05/22 1500)  Resp: 11 (07/05/22 1500)  BP: 98/73 (07/05/22 1358)  SpO2: 99 % (07/05/22 1215)  Weight: 93.7 kg (206 lb 9.1 oz) (07/05/22 1358)  Height: 6' 3" (190.5 cm) (07/05/22 1358)    PICC  Date/Time: 7/5/2022 3:55 PM  Performed by: Evy Tomlin RN  Assisting provider: Alex Boateng RN  Consent Done: Yes  Time out: Immediately prior to procedure a time out was called to verify the correct patient, procedure, equipment, support staff and site/side marked as required  Indications: med administration and vascular access  Anesthesia: local infiltration  Local anesthetic: lidocaine 1% without epinephrine  Anesthetic Total (mL): 3  Description of findings: PICC  Preparation: skin prepped with ChloraPrep  Skin prep agent dried: skin prep agent completely dried prior to procedure  Sterile barriers: all five maximum sterile barriers used - cap, mask, sterile gown, sterile gloves, and large sterile sheet  Hand hygiene: hand hygiene performed prior to central venous catheter insertion  Location details: right brachial  Catheter type: double lumen  Catheter size: 5 Fr  Catheter Length: 39cm    Ultrasound guidance: yes  Vessel Caliber: medium and patent, compressibility normal  Vascular Doppler: not done  Needle advanced into vessel with real time Ultrasound guidance.  Guidewire confirmed in vessel.  Image recorded and saved.  Sterile sheath used.  Number of attempts: 1  Post-procedure: blood return through all ports, chlorhexidine patch and sterile dressing applied  Technical procedures used: 3CG  Specimens: No  Implants: No  Assessment: placement verified by x-ray  Complications: none          Name   7/5/2022    "

## 2022-07-05 NOTE — PT/OT/SLP PROGRESS
Occupational Therapy      Patient Name:  Kevan Queen   MRN:  [id number]    Pt had been OOB to chair throughout morning and had just returned to bed upon OT arrival this afternoon. Pt reports increased fatigue and declined OOB activity. OT unable to return this date and will check status at later date for continued OT services.     7/5/2022

## 2022-07-05 NOTE — ASSESSMENT & PLAN NOTE
- NIDCM  - Was not evaluated for OHTx as he quit smoking in April 2022  - Received HM3 on 6/29  - See LVAD  -Unlikely OHTx candidate with smoking history (quit April 2022)

## 2022-07-05 NOTE — PROGRESS NOTES
[facility] - [facility]  Heart Transplant  Progress Note    Patient Name: Kevan Queen  MRN: [id number]  Admission Date: 6/13/2022  Hospital Length of Stay: 22 days  Attending Physician: Luis F Paige MD  Primary Care Provider: ORALIA Cline  Principal Problem:LVAD (left ventricular assist device) present    Subjective:     **Interval History: No acute events overnight.  Patient reports having pain uncontrolled by pain regimen, but seems lethargic this morning.  He is negative 1.5L in the last 24 hours.  CVP: 20.  Planning for echo today.  Would increased lasix drip as well as increase  to 5 to improve diuresis.     Continuous Infusions:   sodium chloride 0.9% 5 mL/hr at 06/27/22 0055    sodium chloride 0.9% 5 mL/hr at 07/05/22 1300    DOBUTamine IV infusion (non-titrating) 4 mcg/kg/min (07/05/22 1300)    EPINEPHrine 0.02 mcg/kg/min (07/05/22 1300)    furosemide (LASIX) 2 mg/mL continuous infusion (non-titrating) 15 mg/hr (07/05/22 1300)    heparin (porcine) in 5 % dex 1,700 Units/hr (07/05/22 1300)    insulin regular 1 units/mL infusion orderable (TRANSFER) 3 Units/hr (07/05/22 1300)    nicardipine Stopped (07/05/22 0548)    nitric oxide gas       Scheduled Meds:   acetaZOLAMIDE  250 mg Oral Once    albumin human 5%  25 g Intravenous Once    amLODIPine  10 mg Oral Daily    DAPTOmycin (CUBICIN)  IV  8 mg/kg Intravenous Q24H    ferrous gluconate  324 mg Oral Daily with breakfast    insulin aspart U-100  10 Units Subcutaneous TIDWM    INV aspirin/placebo  100 mg Oral Daily    LIDOcaine  1 patch Transdermal Q24H    polyethylene glycol  17 g Per NG tube Daily    potassium bicarbonate  50 mEq Oral BID    tiZANidine  2 mg Oral Q8H    warfarin  6 mg Oral Daily     PRN Meds:albumin human 5%, albuterol sulfate, bisacodyL, dextrose 10%, dextrose 10%, glucagon (human recombinant), glucose, glucose, hydrALAZINE, HYDROmorphone, insulin aspart U-100, magnesium hydroxide 400 mg/5 ml,  magnesium sulfate IVPB, potassium chloride, potassium chloride in water, sodium chloride 0.9%, sodium chloride 0.9%, traMADoL    Review of patient's allergies indicates:   Allergen Reactions    Aspirin Other (See Comments)     Mr. Thacker is enrolled in Dr. Paige's Alon Trial and cannot have any aspirin and/or aspirin-containing products. DO NOT cancel any orders for INV Aspirin 100 mg/Placebo. If you have questions, please contact Isabel @ [phone], [phone],[email or web address], secure chat or MS Teams message.    Bumex [bumetanide] Hives    Lactose Diarrhea     Other reaction(s): Abdominal distension, gaseous    Torsemide Hives     Objective:     Vital Signs (Most Recent):  Temp: 98.4 °F (36.9 °C) (07/05/22 1130)  Pulse: (!) 112 (07/05/22 1300)  Resp: (!) 30 (07/05/22 1309)  BP: (!) 84/0 (07/05/22 1130)  SpO2: 99 % (07/05/22 1215)   Vital Signs (24h Range):  Temp:  [98.4 °F (36.9 °C)-100.76 °F (38.2 °C)] 98.4 °F (36.9 °C)  Pulse:  [104-119] 112  Resp:  [11-42] 30  SpO2:  [84 %-100 %] 99 %  BP: (82-90)/(0) 84/0  Arterial Line BP: ()/(65-85) 87/74     No data found.    Body mass index is 25.82 kg/m².      Intake/Output Summary (Last 24 hours) at 7/5/2022 1330  Last data filed at 7/5/2022 1300  Gross per 24 hour   Intake 2720.95 ml   Output 3765 ml   Net -1044.05 ml         Hemodynamic Parameters:       Telemetry: ST    Physical Exam  Constitutional:       Comments: Sitting in chair NAD   HENT:      Head: Normocephalic and atraumatic.   Eyes:      Conjunctiva/sclera: Conjunctivae normal.      Pupils: Pupils are equal, round, and reactive to light.   Neck:      Comments: LIJ TLC,   Cardiovascular:      Rate and Rhythm: Regular rhythm. Tachycardia present.      Comments: Smooth VAD hum  Pulmonary:      Breath sounds: Normal breath sounds.   Abdominal:      Palpations: Abdomen is soft.      Comments: Hypoactive bowel sounds   Musculoskeletal:         General: No swelling.      Cervical back: Neck  supple.   Skin:     General: Skin is dry.      Capillary Refill: Capillary refill takes 2 to 3 seconds.      Comments: Extremities cool to touch with fans       Significant Labs:  CBC:  Recent Labs   Lab 07/03/22 0352 07/04/22 0301 07/05/22  0310   WBC 24.35* 24.46* 22.05*   RBC 2.86* 2.79* 2.72*   HGB 8.1* 7.9* 7.8*   HCT 24.4* 24.4* 23.8*    214 216   MCV 85 88 88   MCH 28.3 28.3 28.7   MCHC 33.2 32.4 32.8       BNP:  Recent Labs   Lab 07/01/22  0411 07/04/22  0301   * 776*       CMP:  Recent Labs   Lab 07/04/22 2318 07/05/22 0310 07/05/22  0750   * 175* 206*   CALCIUM 9.2 9.2 8.9   ALBUMIN 3.2* 3.1* 3.1*   PROT 7.0 6.9 6.8   * 131* 131*   K 3.4* 3.8 3.9   CO2 30* 30* 27   CL 86* 87* 88*   BUN 43* 42* 41*   CREATININE 1.5* 1.5* 1.4   ALKPHOS 125 122 119   * 439* 392*   * 134* 114*   BILITOT 1.7* 1.8* 1.8*        Coagulation:   Recent Labs   Lab 07/03/22 0352 07/03/22  1554 07/04/22 0301 07/04/22  0643 07/04/22  1513 07/04/22  2318 07/05/22  0310 07/05/22  0609   INR 1.1  --  1.2  --   --   --  1.4*  --    APTT 31.8   < >  --    < > 38.3* 40.2*  --  45.9*    < > = values in this interval not displayed.       LDH:  Recent Labs   Lab 07/03/22 0352 07/04/22 0301 07/05/22  0310   LDH 1,464* 894* 686*       Microbiology:  Microbiology Results (last 7 days)       Procedure Component Value Units Date/Time    Blood culture [[id number]] Collected: 06/30/22 1836    Order Status: Completed Specimen: Blood from Peripheral, Wrist, Left Updated: 07/04/22 2012     Blood Culture, Routine No Growth to date      No Growth to date      No Growth to date      No Growth to date      No Growth to date    Blood culture [[id number]] Collected: 06/30/22 1833    Order Status: Completed Specimen: Blood from Peripheral, Forearm, Right Updated: 07/04/22 2012     Blood Culture, Routine No Growth to date      No Growth to date      No Growth to date      No Growth to date      No Growth to date     Blood culture [[id number]] Collected: 07/02/22 1629    Order Status: Completed Specimen: Blood from Peripheral, Hand, Right Updated: 07/04/22 1812     Blood Culture, Routine No Growth to date      No Growth to date      No Growth to date    Blood culture [[id number]] Collected: 07/02/22 1618    Order Status: Completed Specimen: Blood from Peripheral, Antecubital, Right Updated: 07/04/22 1812     Blood Culture, Routine No Growth to date      No Growth to date      No Growth to date    Blood culture [[id number]]  (Abnormal)  (Susceptibility) Collected: 06/23/22 0108    Order Status: Completed Specimen: Blood from Peripheral, Hand, Right Updated: 07/01/22 1137     Blood Culture, Routine Gram stain dudley bottle: Gram positive cocci in clusters resembling Staph       Results called to and read back by: Mark Pickett RN  06/24/2022        14:36      STAPHYLOCOCCUS EPIDERMIDIS    Blood culture [[id number]] Collected: 06/25/22 0634    Order Status: Completed Specimen: Blood from Peripheral, Wrist, Left Updated: 06/30/22 0812     Blood Culture, Routine No growth after 5 days.    Blood culture [[id number]] Collected: 06/25/22 0633    Order Status: Completed Specimen: Blood from Peripheral, Antecubital, Right Updated: 06/30/22 0812     Blood Culture, Routine No growth after 5 days.            I have reviewed all pertinent labs within the past 24 hours.    Estimated Creatinine Clearance: 86.3 mL/min (based on SCr of 1.4 mg/dL).    Diagnostic Results:  I have reviewed and interpreted all pertinent imaging results/findings within the past 24 hours.    Assessment and Plan:     38 yo male with NIDCM with BiV systolic heart failure, on home Milrinone at 0.375 mcg/kg/min, presented at [facility] 4/2/22 ,was not evaluated for OHT as he has recently quit smoking in April 2022 but was approved for VAD with plan to begin OHT evaluation in upcoming months if Mr Queen is stable and suitable for OHT eval (blood group A), issues with frozen  "shoulder following ICD implant in the past, had clinic appointment last week to f/u recent admit for hyperglycemic hyperosmolar syndrome but did not come as he was "feeling too bad" presents to our ED with SOB at rest for 1 week, 6# weight gain (reports dry weight is 217#), inability to sleep past 3 nights 2/2 SOB (says he sleep on his side). Went to ED at [facility] 6/10 but left after waiting 4 hours. Had clinic appointment with us today, but arrived to [facility] early this morning and decided to go to the ED instead. Baseline Lasix dose is 80 mg bid. Reports taking 240 mg qd past 3 days with no improvement. BNP is 1701, up from 898 on 6/2 and 49 on 5/24. sCr is 1.8 with baseline ~ 1.5-1.7. sPO2 on RA is 93%. Wife at bedside    He has been given Lasix 80 mg IVP in the ED with plan to start Lasix gtt at 20 mg/hr           * LVAD (left ventricular assist device) present  S/P DT HM3 with MVR plus Protek Duo for RV support 6/29 (Grecia)  -Rvad removed with concern for hemolysis, Marcella increased to 20, LVAD speed increased to 5200 on 7/2  -Post op antibiotic per CTS (h/o Staph epi bacteremia, cleareed by ID for surgery with rec to continue antibiotic coverage for 2 weeks post op)  -AC per CTS  -Would increase lasix drip and     Procedure: Device Interrogation Including analysis of device parameters  Current Settings: Ventricular Assist Device  Review of device function is stable    TXP LVAD INTERROGATIONS 7/5/2022 7/5/2022 7/5/2022 7/5/2022 7/5/2022 7/5/2022 7/5/2022   Type HeartMate3 HeartMate3 HeartMate3 HeartMate3 HeartMate3 HeartMate3 HeartMate3   Flow 4.5 4.4 4.4 4.5 4.6 4.3 4.6   Speed 5200 5200 5200 5200 5200 5200 5200   PI 4.1 4.5 4 3.9 3.3 3.2 3.4   Power (Serna) 3.8 3.7 3.7 3.7 3.8 3.7 3.6   LSL 4800 4800 4800 4800 4800 4800 4800   Pulsatility Intermittent pulse Intermittent pulse Intermittent pulse - - - Intermittent pulse       Acute on chronic combined systolic and diastolic congestive heart failure, " NYHA class 4  - NIDCM  - Was not evaluated for OHTx as he quit smoking in April 2022  - Received HM3 on 6/29  - See LVAD  -Unlikely OHTx candidate with smoking history (quit April 2022)     Staphylococcus epidermidis bacteremia  Blood cultures 6/20 and repeat 6/21 positive for Staph Epi. One of two blood cultures from 6/23 positive for Staph (taken prior to line exchange). Repeat cultures sent 6/25 NGTD.   -IJ exchanged on 6/23, IABP exchanged 6/23  -14 day course of vancomycin from VAD implantation on 6/29. End date: 7/12/22        CKD (chronic kidney disease) stage 3, GFR 30-59 ml/min  SCr baseline ~ 1.5-1.7      Uncontrolled diabetes mellitus  Admitted 5/24-5/27 with hyperglycemia hyperosmolar syndrome  -Hgb A1C 9.2 on 5/20/22  -Endocrine following, on insulin gtt    Uninterrupted Critical Care/Counseling Time (not including procedures): 60 minutes      DEYA Martinez  Heart Transplant  [facility] - Surgical Intensive Care

## 2022-07-05 NOTE — PLAN OF CARE
Problem: SLP  Goal: SLP Goal  Description: Speech-Language Pathology Goals  Goals to be met by 7/8/22  1. Patient will participate in ongoing swallow assessment in order to determine safest least restrictive diet.   Outcome: Ongoing, Progressing   Continue regular diet/thin liquids.   7/5/2022

## 2022-07-05 NOTE — OP NOTE
Date of service: 6/30/2022    Preop diagnosis:    1. Status post left and Right ventricular assist device placement  2.  Severe RV dysfunction  3.  Diffuse coagulopathy.    Postop diagnosis:  Same      Operation:  1. Washout of the Mediastinum  2. Creation of Ed pericardium with Gerotex Membrane  3. Sternal Closure  4. Wound Vac 50 X 5 cm in size due to high risk for wound dehiscence and wound complications    Staff surgeon:  Luis F Paige  Anesthesia:  GTA  Estimated blood loss:  50 cc    Key findings of the operation:  1.  Significant improvement in diffuse coagulopathy      Indication of operation:  This is a 37-year-old male who underwent Biventricular assist device placement.  Postoperatively the chest was left open due to diffuse coagulopathy and RV failure.  Overnight patient did well and the decision was made to take the patient back for possible closure.  Risks benefits were discussed and informed consent obtained.    Operation:  Patient was brought to the operating room placed in a supine position after induction of anesthesia, area was prepped and draped in the usual sterile fashion.  Previously placed temporary dressing was taken down.  Chest retractor was placed.  Copious amount of irrigation was used to irrigate the chest cavity.  All the chest tubes were irrigated and repositioned.  All cannulation sites were checked for good hemostasis.  Creation of pneo-pericardium was done with the help of [place]-Jerry membrane to help in reentry at the time of transplant or LVAD exchange.  Sternum was then approximated with # 6 stainless steel wires.  Skin and subcutaneous tissues were closed in multiple layers. Due to patient body habitus and risk for wound and sternal complications retention stiches and wound vac was applied. A 50 X 5 cm wound vac was placed with good seal.  Patient was taken back to the intensive care unit in stable condition.    Medicare registration:  Due to unavailability of an adequately  trained cardiothoracic surgery resident performed all parts of the operation myself.

## 2022-07-05 NOTE — PROGRESS NOTES
[facility] - [facility]ensive [facility]  Critical Care - Surgery  Progress Note    Patient Name: Kevan Queen  MRN: [id number]  Admission Date: 6/13/2022  Hospital Length of Stay: 22 days  Code Status: Full Code  Attending Provider: Luis F Paige MD  Primary Care Provider: ORALIA Cline   Principal Problem: Acute on chronic combined systolic and diastolic congestive heart failure, NYHA class 4    Subjective:     Hospital/ICU Course:  No notes on file    Interval History/Significant Events: No acute events overnight. VAD flows stable. Net negative 1.5 L this am. Continues to have issues with pain control.     Follow-up For: Procedure(s) (LRB):  CLOSURE, WOUND, STERNUM (N/A)  INSERTION-RIGHT VENTRICULAR ASSIST DEVICE (Right)  APPLICATION, WOUND VAC (N/A)  IRRIGATION, MEDIASTINUM    Post-Operative Day: 5 Days Post-Op    Objective:     Vital Signs (Most Recent):  Temp: (!) 100.58 °F (38.1 °C) (07/05/22 0900)  Pulse: (!) 112 (07/05/22 0900)  Resp: 12 (07/05/22 0900)  BP: (!) 82/0 (07/05/22 0715)  SpO2: 97 % (07/05/22 0900)   Vital Signs (24h Range):  Temp:  [98.96 °F (37.2 °C)-100.76 °F (38.2 °C)] 100.58 °F (38.1 °C)  Pulse:  [104-119] 112  Resp:  [12-46] 12  SpO2:  [84 %-100 %] 97 %  BP: (82-90)/(0) 82/0  Arterial Line BP: ()/(65-85) 85/72     Weight: 93.7 kg (206 lb 9.1 oz)  Body mass index is 25.82 kg/m².      Intake/Output Summary (Last 24 hours) at 7/5/2022 1006  Last data filed at 7/5/2022 0900  Gross per 24 hour   Intake 2240.61 ml   Output 4010 ml   Net -1769.39 ml       Physical Exam  Vitals reviewed.   Constitutional:       General: He is not in acute distress.  HENT:      Head: Normocephalic and atraumatic.      Nose: Nose normal.      Mouth/Throat:      Mouth: Mucous membranes are moist.   Eyes:      Pupils: Pupils are equal, round, and reactive to light.   Neck:      Comments:     Cardiovascular:      Rate and Rhythm: Regular rhythm. Tachycardia present.      Pulses: Normal pulses.      Comments: S/p  chest closure. Incision c/d/I.        Pulmonary:      Effort: Pulmonary effort is normal. No respiratory distress.      Comments: On 4L NC  Abdominal:      General: Abdomen is flat. There is no distension.      Palpations: Abdomen is soft.   Genitourinary:     Comments: melendez  Skin:     General: Skin is warm and dry.      Capillary Refill: Capillary refill takes less than 2 seconds.   Neurological:      General: No focal deficit present.      Mental Status: He is alert and oriented to person, place, and time.       Lines/Drains/Airways       Central Venous Catheter Line  Duration              Introducer with Double Lumen 06/29/22 1123 5 days    Percutaneous Central Line Insertion/Assessment - Triple Lumen  06/29/22 1200 left internal jugular 5 days              Drain  Duration                  Urethral Catheter 06/29/22 0840 Straight-tip;Temperature probe;Non-latex 14 Fr. 6 days              Arterial Line  Duration             Arterial Line 06/29/22 0832 Left Brachial 6 days              Line  Duration                  VAD 06/29/22 1115 Left ventricular assist device HeartMate 3 5 days              Peripheral Intravenous Line  Duration                  Peripheral IV - Single Lumen 07/02/22 2226 20 G Anterior;Distal;Left Forearm 2 days                    Significant Labs:    CBC/Anemia Profile:  Recent Labs   Lab 07/04/22  0301 07/05/22  0310   WBC 24.46* 22.05*   HGB 7.9* 7.8*   HCT 24.4* 23.8*    216   MCV 88 88   RDW 15.3* 15.4*        Chemistries:  Recent Labs   Lab 07/04/22  0301 07/04/22  0745 07/04/22  1130 07/04/22  2318 07/05/22  0310 07/05/22  0750    135*   < > 131* 131* 131*   K 3.7 4.2   < > 3.4* 3.8 3.9   CL 94* 95   < > 86* 87* 88*   CO2 26 25   < > 30* 30* 27   BUN 51* 49*   < > 43* 42* 41*   CREATININE 1.5* 1.5*   < > 1.5* 1.5* 1.4   CALCIUM 9.3 9.0   < > 9.2 9.2 8.9   ALBUMIN 3.4*  3.4* 3.2*   < > 3.2* 3.1* 3.1*   PROT 7.1  7.1 7.0   < > 7.0 6.9 6.8   BILITOT 1.8*  1.8* 1.8*   < >  1.7* 1.8* 1.8*   ALKPHOS 117  117 112   < > 125 122 119   *  658* 588*   < > 477* 439* 392*   *  310* 245*   < > 154* 134* 114*   MG 2.4 2.4  --  2.4 2.4  --    PHOS 3.7 2.8  --   --  3.1  --     < > = values in this interval not displayed.       All pertinent labs within the past 24 hours have been reviewed.    Significant Imaging:  I have reviewed all pertinent imaging results/findings within the past 24 hours.    Assessment/Plan:     * Acute on chronic combined systolic and diastolic congestive heart failure, NYHA class 4  Kevan Queen is a 37yoM with a history of polysubstance abuse who presented to the hospital with decompensated heart failure. He underwent LVAD, RVAD and MVr on 6/29/22. He is admitted to the ICU post-operatively.       Neuro/Psych:   -- Sedation: none  -- Pain: Add scheduled tizanidine, lidocaine patch; PRN Tramadol and dilaudid for breakthough             Cards:   -- s/p LVAD, RVAD insertion and MVr. RVAD removed 7/1  -- HDS on epi 0.03, dobutamine 4 for inotropic support; Begin to wean epi by 0.01 Q12H  -- s/p chest closure 6/30   -- MAP goal 75-85  -- Off cardene, PRN hydral available, continue norvasc      Pulm:   -- Goal O2 sat > 90%  -- ABG PRN  -- Extubated 7/1  -- iNO2 @ 20ppm; wean as tolerated starting today with goal 5-10 ppm tomorrow morning  -- daily CXR      Renal:  -- DC melendez   -- Trend BUN/Cr   -- lasix gtt at 15, diamox 250 today  -- Goal net negative 1-2L, will redose diamox pending track this afternoon       FEN / GI:   -- Replace lytes as needed  -- Nutrition: cardiac  -- GI ppx: famotidine  -- Bowel reg: miralax; add 1/2 bottle mag citrate today  -- Hepatology consult to acute liver failure, appreciate recs. Liver enzymes improving.  -- discontinue NAC      ID:   -- Tm: febrile to afebrile; WBC stable  -- Currently on dapto  -- Blood Cx from 7/2, remain negative      Heme/Onc:   -- H/H stable   -- Daily CBC  -- no intra-op product administered  --  2u autologous  -- 1u pRBC, 1u FFP, 500mL albumin overnight (6/29)  -- Heparin gtt, increased to 1500; Warfarin 6 mg QD  -- PTT goal of 45-54      Endo:   -- BG goal 140-180  -- Insulin gtt      PPx:   Feeding: cardiac  Analgesia/Sedation:none / PRN oxy,Fentanyl  Thromboembolic prevention: SCDs, heparin gtt, warfarin   HOB >30: Yes  Stress Ulcer ppx: famotidine  Glucose control: Critical care goal 140-180 g/dl, ISS     Lines/Drains/Airway: CVC [facility] - will re-consult PICC team today, Cartwright, L brachial A-line; LVAD      Dispo/Code Status/Palliative:   -- SICU / Full Code.          Michael Quinn MD  Critical Care - Surgery  [facility] - [facility]

## 2022-07-05 NOTE — SUBJECTIVE & OBJECTIVE
Interval History/Significant Events: No acute events overnight. VAD flows stable. Net negative 1.5 L this am. Continues to have issues with pain control.     Follow-up For: Procedure(s) (LRB):  CLOSURE, WOUND, STERNUM (N/A)  INSERTION-RIGHT VENTRICULAR ASSIST DEVICE (Right)  APPLICATION, WOUND VAC (N/A)  IRRIGATION, MEDIASTINUM    Post-Operative Day: 5 Days Post-Op    Objective:     Vital Signs (Most Recent):  Temp: (!) 100.58 °F (38.1 °C) (07/05/22 0900)  Pulse: (!) 112 (07/05/22 0900)  Resp: 12 (07/05/22 0900)  BP: (!) 82/0 (07/05/22 0715)  SpO2: 97 % (07/05/22 0900)   Vital Signs (24h Range):  Temp:  [98.96 °F (37.2 °C)-100.76 °F (38.2 °C)] 100.58 °F (38.1 °C)  Pulse:  [104-119] 112  Resp:  [12-46] 12  SpO2:  [84 %-100 %] 97 %  BP: (82-90)/(0) 82/0  Arterial Line BP: ()/(65-85) 85/72     Weight: 93.7 kg (206 lb 9.1 oz)  Body mass index is 25.82 kg/m².      Intake/Output Summary (Last 24 hours) at 7/5/2022 1006  Last data filed at 7/5/2022 0900  Gross per 24 hour   Intake 2240.61 ml   Output 4010 ml   Net -1769.39 ml       Physical Exam  Vitals reviewed.   Constitutional:       General: He is not in acute distress.  HENT:      Head: Normocephalic and atraumatic.      Nose: Nose normal.      Mouth/Throat:      Mouth: Mucous membranes are moist.   Eyes:      Pupils: Pupils are equal, round, and reactive to light.   Neck:      Comments:     Cardiovascular:      Rate and Rhythm: Regular rhythm. Tachycardia present.      Pulses: Normal pulses.      Comments: S/p chest closure. Incision c/d/I.        Pulmonary:      Effort: Pulmonary effort is normal. No respiratory distress.      Comments: On 4L NC  Abdominal:      General: Abdomen is flat. There is no distension.      Palpations: Abdomen is soft.   Genitourinary:     Comments: melendez  Skin:     General: Skin is warm and dry.      Capillary Refill: Capillary refill takes less than 2 seconds.   Neurological:      General: No focal deficit present.      Mental  Status: He is alert and oriented to person, place, and time.       Lines/Drains/Airways       Central Venous Catheter Line  Duration              Introducer with Double Lumen 06/29/22 1123 5 days    Percutaneous Central Line Insertion/Assessment - Triple Lumen  06/29/22 1200 left internal jugular 5 days              Drain  Duration                  Urethral Catheter 06/29/22 0840 Straight-tip;Temperature probe;Non-latex 14 Fr. 6 days              Arterial Line  Duration             Arterial Line 06/29/22 0832 Left Brachial 6 days              Line  Duration                  VAD 06/29/22 1115 Left ventricular assist device HeartMate 3 5 days              Peripheral Intravenous Line  Duration                  Peripheral IV - Single Lumen 07/02/22 2226 20 G Anterior;Distal;Left Forearm 2 days                    Significant Labs:    CBC/Anemia Profile:  Recent Labs   Lab 07/04/22  0301 07/05/22  0310   WBC 24.46* 22.05*   HGB 7.9* 7.8*   HCT 24.4* 23.8*    216   MCV 88 88   RDW 15.3* 15.4*        Chemistries:  Recent Labs   Lab 07/04/22  0301 07/04/22  0745 07/04/22  1130 07/04/22  2318 07/05/22  0310 07/05/22  0750    135*   < > 131* 131* 131*   K 3.7 4.2   < > 3.4* 3.8 3.9   CL 94* 95   < > 86* 87* 88*   CO2 26 25   < > 30* 30* 27   BUN 51* 49*   < > 43* 42* 41*   CREATININE 1.5* 1.5*   < > 1.5* 1.5* 1.4   CALCIUM 9.3 9.0   < > 9.2 9.2 8.9   ALBUMIN 3.4*  3.4* 3.2*   < > 3.2* 3.1* 3.1*   PROT 7.1  7.1 7.0   < > 7.0 6.9 6.8   BILITOT 1.8*  1.8* 1.8*   < > 1.7* 1.8* 1.8*   ALKPHOS 117  117 112   < > 125 122 119   *  658* 588*   < > 477* 439* 392*   *  310* 245*   < > 154* 134* 114*   MG 2.4 2.4  --  2.4 2.4  --    PHOS 3.7 2.8  --   --  3.1  --     < > = values in this interval not displayed.       All pertinent labs within the past 24 hours have been reviewed.    Significant Imaging:  I have reviewed all pertinent imaging results/findings within the past 24 hours.

## 2022-07-05 NOTE — PROGRESS NOTES
"[facility] - [facility]  Endocrinology  Progress Note    Admit Date: 6/13/2022     Reason for Consult: Management of  type 2 DM, Hyperglycemia     Surgical Procedure and Date: none    Diabetes diagnosis year: 4/21/21    Home Diabetes Medications:  Detemir  23  units daily and Aspart   12  units TIDWM and  Mod dose correction insulin    Lab Results   Component Value Date    HGBA1C 9.2 (H) 05/20/2022           How often checking glucose at home? 1-3 x day   BG readings on regimen: 180- up to 300 once  Hypoglycemia on the regimen?  yes once- related to not really eating after taking aspart   Missed doses on regimen?  no    Diabetes Complications include:     Hyperglycemia    Complicating diabetes co morbidities:   History of MI, CHF, and CKD      HPI:   Patient is a 37 y.o. male with a diagnosis of type 2 DM and NIDCM with BiV systolic heart failure. Patient was presented at [facility] 4/2/22  and was  approved for VAD. Also recently admitted with [facility]; he had clinic appointment last week to f/u recent admit for hyperglycemic hyperosmolar syndrome but did not come as he was "feeling too bad" presents to  ED with SOB. Endocrinology consulted for BG/ DM management.                Interval HPI:   Overnight events: No acute events overnight. Patient on the SICU in room 28825 CVICU/02851 CVICU A. Blood glucose worsening. BG above goal on current insulin regimen (Transition Insulin Drip). Steroid use- None. 5 Days Post-Op  Renal function- Abnormal - Creatinine 1.5   Vasopressors-  Epinephrine     Eating: Diet Cardiac [facility]; Consistent Carbohydrate; Isolation Tray - Regular [place]; Fluid - 1500mL  100%  Nausea: No  Hypoglycemia and intervention: No  Fever: No  TPN and/or TF: No      BP 98/73   Pulse 104   Temp 98.4 °F (36.9 °C) (Oral)   Resp 11   Ht 6' 3" (1.905 m)   Wt 93.7 kg (206 lb 9.1 oz)   SpO2 99%   BMI 25.82 kg/m²     Labs Reviewed and Include    Recent Labs   Lab 07/05/22  0750 " 07/05/22  1450   *  --    CALCIUM 8.9  --    ALBUMIN 3.1*  --    PROT 6.8  --    *  --    K 3.9 3.9   CO2 27  --    CL 88*  --    BUN 41*  --    CREATININE 1.4  --    ALKPHOS 119  --    *  --    *  --    BILITOT 1.8*  --      Lab Results   Component Value Date    WBC 22.05 (H) 07/05/2022    HGB 7.8 (L) 07/05/2022    HCT 23.8 (L) 07/05/2022    MCV 88 07/05/2022     07/05/2022     No results for input(s): TSH, FREET4 in the last 168 hours.  Lab Results   Component Value Date    HGBA1C 9.2 (H) 05/20/2022       Nutritional status:   Body mass index is 25.82 kg/m².  Lab Results   Component Value Date    ALBUMIN 3.1 (L) 07/05/2022    ALBUMIN 3.1 (L) 07/05/2022    ALBUMIN 3.2 (L) 07/04/2022     Lab Results   Component Value Date    PREALBUMIN 19 (L) 06/30/2022    PREALBUMIN 36 04/18/2022       Estimated Creatinine Clearance: 86.3 mL/min (based on SCr of 1.4 mg/dL).    Accu-Checks  Recent Labs     07/04/22  0203 07/04/22  0750 07/04/22  1122 07/04/22  1513 07/04/22  1557 07/04/22  1715 07/04/22  2055 07/05/22  0314 07/05/22  0746 07/05/22  1137   POCTGLUCOSE 131* 354* 308* 330* 310* 343* 246* 201* 228* 201*       Current Medications and/or Treatments Impacting Glycemic Control  Immunotherapy:    Immunosuppressants       None          Steroids:   Hormones (From admission, onward)                None          Pressors:    Autonomic Drugs (From admission, onward)                Start     Stop Route Frequency Ordered    06/29/22 1915  EPINEPHrine (ADRENALIN) 5 mg in dextrose 5 % 250 mL infusion         -- IV Continuous 06/29/22 1904          Hyperglycemia/Diabetes Medications:   Antihyperglycemics (From admission, onward)                Start     Stop Route Frequency Ordered    07/05/22 1645  insulin aspart U-100 pen 12 Units         -- SubQ 3 times daily with meals 07/05/22 1440    07/05/22 1445  insulin regular in 0.9 % NaCl 100 unit/100 mL (1 unit/mL) infusion        Question:  Enter initial  dose (Units/hr):  Answer:  3.5    -- IV Continuous 07/05/22 1440    07/01/22 1833  insulin aspart U-100 pen 0-10 Units         -- SubQ As needed (PRN) 07/01/22 1733            ASSESSMENT and PLAN    * LVAD (left ventricular assist device) present  Managed per primary team  Avoid hypoglycemia        Uncontrolled diabetes mellitus  Endocrinology consulted for BG management.   BG goal 140-180    - Transition insulin infusion at 3.5 u/hr with step-down parameters.   - Novolog 12 units TIDWM and prn for BG excursions [facility] (150/25) SSI.   - BG monitoring ac/hs/0200 and moderate dose correction scale.   - Hypoglycemia protocol in place.     ** Please notify Endocrine for any change and/or advance in diet**  ** Please call Endocrine for any BG related issues **    Discharge Planning:   TBD. Please notify endocrinology prior to discharge.        Acute on chronic combined systolic and diastolic heart failure, NYHA class 4  Optimize glucose control and  avoid hypoglycemia    Managed per primary.       CKD (chronic kidney disease) stage 3, GFR 30-59 ml/min    Titrate insulin slowly to avoid hypoglycemia as the risk of hypoglycemia increases with decreased creatinine clearance.    Estimated Creatinine Clearance: 80.6 mL/min (A) (based on SCr of 1.5 mg/dL (H)).      Surgical wound present  Optimize BG for surgical wound healing.              Michael Wyman, DNP, FNP  Endocrinology  [facility]

## 2022-07-05 NOTE — OP NOTE
Date of service:  6/29/2022    Preoperative diagnosis:   1.  Acute on chronic systolic and diastolic heart failure, NYHA Class IV, INTERMACS 2  2.  Cardiogenic shock  3. Massive Volume Overload  4. Uncontrolled diabetes mellitus  5. Shortness of breath number  6. Transaminitis  7. Severe RV failure  8. Severe mitral regurgitation   9. Vasoplegia      Postop diagnosis:  Same    Operation:  1.  Mitral valve repair-Edge to edge repair-through the LV apex using Elyse stitch   2.  Implantation of left ventricular assist device-HeartMate 3  3. Placement of right ventricular assist device-Protek Duo through the right IJ  4. Removal of intra-aortic balloon pump   5.  Temporary closure of the chest  6. Significantly complex case due to severe RV failure as well as severe volume overload which increased the complexity as well as increased the operative time by more than 3 hours along with severe diffuse coagulopathy requiring transfusion of blood products and severe vasoplegia requiring initiation of Giapreza    Staff surgeon:  Luis F Paige  First assistant:  Thuan Silva  2nd assistant:  Chong Daniel  Anesthesia:  GTA  Estimated blood loss:  400 cc     Key findings of the operation:  1.  Diffuse coagulopathy  2.  Severe RV dysfunction   3.  Significant hemoconcentration carried out total of 2400 cc, 1600 cc of urine output and 400 cc of blood left in the reservoir due to severe volume overload  4.  Patient significantly fluid overload with a CVP of 25    Indication of operation:  This is a 37-year-old male who presented with acute on chronic systolic and diastolic heart failure with cardiogenic shock.  Patient was worked up and placed on multiple inotropes and nitric oxide . Patient was worked up and was found to be a candidate for a destination therapy LVAD.  Patient was offered different treatment options and different types of problems available.  Patient after understanding the risks and benefits and treatment options  wanted to proceed with implantation of a HeartMate 3.  An informed consent was obtained.    Operation:  Patient was brought to the operating room and placed in supine position.  After induction of anesthesia area was prepped and draped in the usual sterile fashion.  An upper midline incision was made which was carried all the way down to the sternum.  A median sternotomy was then performed.  Sternal edges were cauterized.  Chest retractor was placed.  Pericardium was then opened up.  Pericardial well was created.  Left hemidiaphragm was then taken down.  Systemic heparinization was carried out.  Preperitoneal space was dissected for LVAD pocket.  Cannulation stitches were then placed.  Arterial cannula placed in the ascending aorta.  Right atrial appendage was used for placement of venous cannula.  Patient was placed on full cardiopulmonary bypass after an ACT of 450 was achieved.  Carbon dioxide was used to flood the operative field continuously to reduce chances of any air embolism.  Heart was brought to the field by placing multiple lap sponges behind the heart.  True LV apex was identified on the examination in multiple views.  LV apex was cored and submitted for pathology.  Multiple 2-0 Ethibond pledgetted stitches were placed circumferentially.  This helped up in opening of the LV apex.  Malleable retractors were used for visualization of the mitral valve.  Subvalvular apparatus of the mitral valve appeared to be intact.  Posterior leaflet was found to be significantly tethered.  This was resulting in moderate to severe mitral regurgitation.  Analysis of A2 and P2 segment of the mitral valve was carried out.  Both these segments was found to be of good quality.  A decision was made to carry out a patient with repair of the mitral valve.  Using a 5-0 pledgetted Ethibond stitch A2 and P2 segments of the mitral valve were repaired.  Sutures were tied down.  Valve was then tested.  Significant reduction in mitral  regurgitation was noted.    Once this was done attention was then directed towards the LV apex.  All the sutures were then passed through the LV apex was now passed through the sewing ring.  Needles were cut and passed off the field and sutures were tied down.  Once that was done LVAD was brought to the field it was lowered into the sewing ring and secured with locking mechanism.  The LVAD was lowered back into the chest cavity after removing all the lap sponges and the LVAD pump positioned in the preperitoneal space.  Using a tunneling device the driveline was pulled out through the exit site and the level of the umbilicus in the right midclavicular line.  Outflow graft was brought to the field and measured for appropriate length.  It was beveled at 45 degree angle.  Side-biting clamp was applied on the ascending aorta.  Aortotomy was then carried out.  Using a 5-0 Prolene suture continuous running anastomosis was performed of the outflow graft to the ascending aorta.  Side-biting clamp was then removed.  De-airing of the outflow graft was carried out.  Wet to wet connection of the outflow graft to the LVAD was carried out.  Aortic root vent was placed in the ascending aorta.  The driveline was then connected to the console and started at 3000 RPMs.  Ventilation was resumed.  Electrolytes was found to be within normal limits.  On inotropics support patient was weaned off cardiopulmonary bypass after ensuring absence of any intracardiac air on GUILLERMO examination.  With volume loading the speed of the pump was gradually increased from 3000 RPMs all the way to 4500 RPMs.  Patient's RV function was significantly depressed and needed high inotropic support along with the high doses of nitric oxide.  At this stage decision was made to implant a right ventricular assist device.  The right IJ access was obtained.  Under fluoroscopy 018 wire was placed and positioned into the RPA.  This was changed over for the pigtail  catheter and 10 cc of dye was injected for visualization of the bifurcation of the pulmonary artery and the position of pulmonic valve for positioning of the arm and cannula.  Once this was done dilation of the track was carried out using serial dilators and a 31 German Protek Duo cannula was placed injuring the tip of the cannula was distal to the pulmonic valve and 2 cm proximal to the bifurcation.  That event connection of the cannula was carried out with the RVAD.  Once we secured the cannula to the neck the right ventricular assist device was initiated.  Gradually the speed of the device was increased to 5000 RPMs to provide 3.4 L of flow.  Simultaneously, the speed of the left was increased to 5000 RPMs to provide a 3.8 L of flow.  GUILLERMO examination revealed a balanced interventricular septum with adequate filling of the left ventricle.  Patient also was suffering from severe vasoplegia and diffuse coagulopathy.  Blood products were transfused to correct the diffuse coagulopathy and we initiated use of Giapreza to counteract loss of vascular tone.  Significant amount of time was spent to achieve hemostasis as well as improvement in blood pressure.  An additional 3 hours was spent to achieve the desired goals.  During this time patient had produced 1600 cc of fluid with expressive diuresis.  CVP of the patient was now 10 and the nitric oxide use was also decreased to 10 parts per million.  RVAD insertion was assisted with Dr. Daniel as the first assistant.  Dr. Daniel removed the intra-aortic balloon pump followed by Perclosure of the femoral artery. Removal of IABP was done by Dr Daniel in entirety,    Test dose of protamine was given followed by full dose of protamine to reverse the effects of systemic heparin.  Two drainage tubes were placed and brought through separate skin incision and connected to Pleur-Evac.  Temporary closure of the chest was carried out with Esmarch and Ioban to obtain an airtight  seal.    Terminal count of needles sponges instrument was found to be correct.  Diverting exit site was sutured with 2-0 Ethibond stitch.  Patient was taken back to the intensive care unit in a stable condition.    Medicare attestation:  Due to unavailability of an adequately trained cardiothoracic surgery resident I performed all parts of the operation myself.

## 2022-07-05 NOTE — SUBJECTIVE & OBJECTIVE
**Interval History: No acute events overnight.  Patient reports having pain uncontrolled by pain regimen, but seems lethargic this morning.  He is negative 1.5L in the last 24 hours.  CVP: 20.  Planning for echo today.  Would increased lasix drip as well as increase  to 5 to improve diuresis.     Continuous Infusions:   sodium chloride 0.9% 5 mL/hr at 06/27/22 0055    sodium chloride 0.9% 5 mL/hr at 07/05/22 1300    DOBUTamine IV infusion (non-titrating) 4 mcg/kg/min (07/05/22 1300)    EPINEPHrine 0.02 mcg/kg/min (07/05/22 1300)    furosemide (LASIX) 2 mg/mL continuous infusion (non-titrating) 15 mg/hr (07/05/22 1300)    heparin (porcine) in 5 % dex 1,700 Units/hr (07/05/22 1300)    insulin regular 1 units/mL infusion orderable (TRANSFER) 3 Units/hr (07/05/22 1300)    nicardipine Stopped (07/05/22 0548)    nitric oxide gas       Scheduled Meds:   acetaZOLAMIDE  250 mg Oral Once    albumin human 5%  25 g Intravenous Once    amLODIPine  10 mg Oral Daily    DAPTOmycin (CUBICIN)  IV  8 mg/kg Intravenous Q24H    ferrous gluconate  324 mg Oral Daily with breakfast    insulin aspart U-100  10 Units Subcutaneous TIDWM    INV aspirin/placebo  100 mg Oral Daily    LIDOcaine  1 patch Transdermal Q24H    polyethylene glycol  17 g Per NG tube Daily    potassium bicarbonate  50 mEq Oral BID    tiZANidine  2 mg Oral Q8H    warfarin  6 mg Oral Daily     PRN Meds:albumin human 5%, albuterol sulfate, bisacodyL, dextrose 10%, dextrose 10%, glucagon (human recombinant), glucose, glucose, hydrALAZINE, HYDROmorphone, insulin aspart U-100, magnesium hydroxide 400 mg/5 ml, magnesium sulfate IVPB, potassium chloride, potassium chloride in water, sodium chloride 0.9%, sodium chloride 0.9%, traMADoL    Review of patient's allergies indicates:   Allergen Reactions    Aspirin Other (See Comments)     Mr. Thacker is enrolled in Dr. Paige's Alon Trial and cannot have any aspirin and/or aspirin-containing products. DO NOT cancel any orders  for INV Aspirin 100 mg/Placebo. If you have questions, please contact Isabel @ [phone], [phone],[email or web address], secure chat or MS Teams message.    Bumex [bumetanide] Hives    Lactose Diarrhea     Other reaction(s): Abdominal distension, gaseous    Torsemide Hives     Objective:     Vital Signs (Most Recent):  Temp: 98.4 °F (36.9 °C) (07/05/22 1130)  Pulse: (!) 112 (07/05/22 1300)  Resp: (!) 30 (07/05/22 1309)  BP: (!) 84/0 (07/05/22 1130)  SpO2: 99 % (07/05/22 1215)   Vital Signs (24h Range):  Temp:  [98.4 °F (36.9 °C)-100.76 °F (38.2 °C)] 98.4 °F (36.9 °C)  Pulse:  [104-119] 112  Resp:  [11-42] 30  SpO2:  [84 %-100 %] 99 %  BP: (82-90)/(0) 84/0  Arterial Line BP: ()/(65-85) 87/74     No data found.    Body mass index is 25.82 kg/m².      Intake/Output Summary (Last 24 hours) at 7/5/2022 1330  Last data filed at 7/5/2022 1300  Gross per 24 hour   Intake 2720.95 ml   Output 3765 ml   Net -1044.05 ml         Hemodynamic Parameters:       Telemetry: ST    Physical Exam  Constitutional:       Comments: Sitting in chair NAD   HENT:      Head: Normocephalic and atraumatic.   Eyes:      Conjunctiva/sclera: Conjunctivae normal.      Pupils: Pupils are equal, round, and reactive to light.   Neck:      Comments: [facility] TLC,   Cardiovascular:      Rate and Rhythm: Regular rhythm. Tachycardia present.      Comments: Smooth VAD hum  Pulmonary:      Breath sounds: Normal breath sounds.   Abdominal:      Palpations: Abdomen is soft.      Comments: Hypoactive bowel sounds   Musculoskeletal:         General: No swelling.      Cervical back: Neck supple.   Skin:     General: Skin is dry.      Capillary Refill: Capillary refill takes 2 to 3 seconds.      Comments: Extremities cool to touch with fans       Significant Labs:  CBC:  Recent Labs   Lab 07/03/22  0352 07/04/22  0301 07/05/22  0310   WBC 24.35* 24.46* 22.05*   RBC 2.86* 2.79* 2.72*   HGB 8.1* 7.9* 7.8*   HCT 24.4* 24.4* 23.8*    214 216   MCV 85 88 88    MCH 28.3 28.3 28.7   MCHC 33.2 32.4 32.8       BNP:  Recent Labs   Lab 07/01/22  0411 07/04/22  0301   * 776*       CMP:  Recent Labs   Lab 07/04/22  2318 07/05/22  0310 07/05/22  0750   * 175* 206*   CALCIUM 9.2 9.2 8.9   ALBUMIN 3.2* 3.1* 3.1*   PROT 7.0 6.9 6.8   * 131* 131*   K 3.4* 3.8 3.9   CO2 30* 30* 27   CL 86* 87* 88*   BUN 43* 42* 41*   CREATININE 1.5* 1.5* 1.4   ALKPHOS 125 122 119   * 439* 392*   * 134* 114*   BILITOT 1.7* 1.8* 1.8*        Coagulation:   Recent Labs   Lab 07/03/22  0352 07/03/22  1554 07/04/22  0301 07/04/22  0643 07/04/22  1513 07/04/22  2318 07/05/22  0310 07/05/22  0609   INR 1.1  --  1.2  --   --   --  1.4*  --    APTT 31.8   < >  --    < > 38.3* 40.2*  --  45.9*    < > = values in this interval not displayed.       LDH:  Recent Labs   Lab 07/03/22  0352 07/04/22  0301 07/05/22  0310   LDH 1,464* 894* 686*       Microbiology:  Microbiology Results (last 7 days)       Procedure Component Value Units Date/Time    Blood culture [[id number]] Collected: 06/30/22 1836    Order Status: Completed Specimen: Blood from Peripheral, Wrist, Left Updated: 07/04/22 2012     Blood Culture, Routine No Growth to date      No Growth to date      No Growth to date      No Growth to date      No Growth to date    Blood culture [[id number]] Collected: 06/30/22 1833    Order Status: Completed Specimen: Blood from Peripheral, Forearm, Right Updated: 07/04/22 2012     Blood Culture, Routine No Growth to date      No Growth to date      No Growth to date      No Growth to date      No Growth to date    Blood culture [[id number]] Collected: 07/02/22 1629    Order Status: Completed Specimen: Blood from Peripheral, Hand, Right Updated: 07/04/22 1812     Blood Culture, Routine No Growth to date      No Growth to date      No Growth to date    Blood culture [[id number]] Collected: 07/02/22 1618    Order Status: Completed Specimen: Blood from Peripheral, Antecubital, Right Updated:  07/04/22 1812     Blood Culture, Routine No Growth to date      No Growth to date      No Growth to date    Blood culture [[id number]]  (Abnormal)  (Susceptibility) Collected: 06/23/22 0108    Order Status: Completed Specimen: Blood from Peripheral, Hand, Right Updated: 07/01/22 1137     Blood Culture, Routine Gram stain dudley bottle: Gram positive cocci in clusters resembling Staph       Results called to and read back by: Mark Pickett RN  06/24/2022        14:36      STAPHYLOCOCCUS EPIDERMIDIS    Blood culture [[id number]] Collected: 06/25/22 0634    Order Status: Completed Specimen: Blood from Peripheral, Wrist, Left Updated: 06/30/22 0812     Blood Culture, Routine No growth after 5 days.    Blood culture [[id number]] Collected: 06/25/22 0633    Order Status: Completed Specimen: Blood from Peripheral, Antecubital, Right Updated: 06/30/22 0812     Blood Culture, Routine No growth after 5 days.            I have reviewed all pertinent labs within the past 24 hours.    Estimated Creatinine Clearance: 86.3 mL/min (based on SCr of 1.4 mg/dL).    Diagnostic Results:  I have reviewed and interpreted all pertinent imaging results/findings within the past 24 hours.

## 2022-07-05 NOTE — ASSESSMENT & PLAN NOTE
Kevan Queen is a 37yoM with a history of polysubstance abuse who presented to the hospital with decompensated heart failure. He underwent LVAD, RVAD and MVr on 6/29/22. He is admitted to the ICU post-operatively.       Neuro/Psych:   -- Sedation: none  -- Pain: Add scheduled tizanidine, lidocaine patch; PRN Tramadol and dilaudid for breakthough             Cards:   -- s/p LVAD, RVAD insertion and MVr. RVAD removed 7/1  -- HDS on epi 0.03, dobutamine 4 for inotropic support; Begin to wean epi by 0.01 Q12H  -- s/p chest closure 6/30   -- MAP goal 75-85  -- Off cardene, PRN hydral available, continue norvasc      Pulm:   -- Goal O2 sat > 90%  -- ABG PRN  -- Extubated 7/1  -- iNO2 @ 20ppm; wean as tolerated starting today with goal 5-10 ppm tomorrow morning  -- daily CXR      Renal:  -- DC al   -- Trend BUN/Cr   -- lasix gtt at 15, diamox 250 today  -- Goal net negative 1-2L, will redose diamox pending track this afternoon       FEN / GI:   -- Replace lytes as needed  -- Nutrition: cardiac  -- GI ppx: famotidine  -- Bowel reg: miralax; add 1/2 bottle mag citrate today  -- Hepatology consult to acute liver failure, appreciate recs. Liver enzymes improving.  -- discontinue NAC      ID:   -- Tm: febrile to afebrile; WBC stable  -- Currently on dapto  -- Blood Cx from 7/2, remain negative      Heme/Onc:   -- H/H stable   -- Daily CBC  -- no intra-op product administered  -- 2u autologous  -- 1u pRBC, 1u FFP, 500mL albumin overnight (6/29)  -- Heparin gtt, increased to 1500; Warfarin 6 mg QD  -- PTT goal of 45-54      Endo:   -- BG goal 140-180  -- Insulin gtt      PPx:   Feeding: cardiac  Analgesia/Sedation:none / PRN oxy,Fentanyl  Thromboembolic prevention: SCDs, heparin gtt, warfarin   HOB >30: Yes  Stress Ulcer ppx: famotidine  Glucose control: Critical care goal 140-180 g/dl, ISS     Lines/Drains/Airway: CVC RIJ - will re-consult PICC team today, Cartwright, L brachial A-line; LVAD      Dispo/Code  Status/Palliative:   -- SICU / Full Code.

## 2022-07-05 NOTE — PT/OT/SLP PROGRESS
"Speech Language Pathology Treatment      Patient Name:  Kevan Queen   MRN:  [id number]  Admitting Diagnosis: LVAD (left ventricular assist device) present    Recommendations:                 General Recommendations:  Follow-up not indicated  Diet recommendations:  Regular, Liquid Diet Level: Thin   Aspiration Precautions: Feed only when awake/alert, HOB to 90 degrees, Small bites/sips and Standard aspiration precautions   General Precautions: Standard, fall, LVAD, sternal  Communication strategies:  none    Subjective     Awake/alert  "I just only like certain things."     Pain/Comfort:  · Pain Rating 1: 0/10  · Pain Rating Post-Intervention 1: 0/10    Respiratory Status: Room air    Objective:     Has the patient been evaluated by SLP for swallowing?   Yes  Keep patient NPO? No     Pt upright in bedside chair upon entry. Pt's voice was clear with adequate intensity noted throughout. He tolerated fruit x3 and pineapple esther x3 oz with cough noted x1 post large consecutive sips. No overt clinical s/s of airway compromise noted with smaller sips and solid trials. Recommend continue regular diet/thin liquids at this time. SLP provided ongoing education into nutritions role in recovery. He verbalized understanding.     Assessment:     Kevan Queen is a 37 y.o. male with an SLP diagnosis of Dysphagia.      Goals:   Multidisciplinary Problems     SLP Goals        Problem: SLP    Goal Priority Disciplines Outcome   SLP Goal     SLP Ongoing, Progressing   Description: Speech-Language Pathology Goals  Goals to be met by 7/8/22  1. Patient will participate in ongoing swallow assessment in order to determine safest least restrictive diet.                    Plan:     · Patient to be seen:  4 x/week   · Plan of Care expires:  07/31/22  · Plan of Care reviewed with:  patient, significant other   · SLP Follow-Up: Yes    Discharge recommendations:   (pending)       Time Tracking:     SLP Treatment Date:   07/05/22  Speech Start " Time:  0823  Speech Stop Time:  0831     Speech Total Time (min):  8 min    Billable Minutes: Treatment Swallowing Dysfunction 8    07/05/2022

## 2022-07-05 NOTE — ASSESSMENT & PLAN NOTE
S/P DT HM3 with MVR plus Protek Duo for RV support 6/29 (Grecia)  -Rvad removed with concern for hemolysis, Marcella increased to 20, LVAD speed increased to 5200 on 7/2  -Post op antibiotic per CTS (h/o Staph epi bacteremia, cleareed by ID for surgery with rec to continue antibiotic coverage for 2 weeks post op)  -AC per CTS  -Would increase lasix drip and     Procedure: Device Interrogation Including analysis of device parameters  Current Settings: Ventricular Assist Device  Review of device function is stable    TXP LVAD INTERROGATIONS 7/5/2022 7/5/2022 7/5/2022 7/5/2022 7/5/2022 7/5/2022 7/5/2022   Type HeartMate3 HeartMate3 HeartMate3 HeartMate3 HeartMate3 HeartMate3 HeartMate3   Flow 4.5 4.4 4.4 4.5 4.6 4.3 4.6   Speed 5200 5200 5200 5200 5200 5200 5200   PI 4.1 4.5 4 3.9 3.3 3.2 3.4   Power (Serna) 3.8 3.7 3.7 3.7 3.8 3.7 3.6   LSL 4800 4800 4800 4800 4800 4800 4800   Pulsatility Intermittent pulse Intermittent pulse Intermittent pulse - - - Intermittent pulse

## 2022-07-05 NOTE — PROGRESS NOTES
Kevan Queen was discussed at selection committee on 04/20/22. Patient presented for OHT and VAD. Declined for OHT due to:  Too ill, recent tobacco     Patient has an anticipated survival benefit. Patient has NYHA IV symptoms which have failed to respond to optimal medical management.     Decision made to proceed with DT VAD implantation. This has been discussed with the patient and family, including the reasons for why they are not a transplant candidate at this time.     Patient is NYHA FC IV heart failure and has an LVEF less than or equal 25%.     Patient has a cardiac index less than 2.2 off inotrope, and meets one of the following criteria:1) are on optimal medical management (OMM) based on current heart failure practice guidelines for at least 45 of the last 60 days and are failing to respondOR2) have advanced heart failure for at least 14 days and are dependent on an IABP or similar mechanical circulatory support for at least 7 days    Patient is INTERMACS class 2, ACC stage D.     Discussed with the patient and family [facility]cul[facility] program outcomes as reported in INTERMACS (Interagency Registry for Mechanically Assisted Circulatory Support):    1 year survival = 92.7%  2 year survival = 86.7%  3 year survival = 86.7%    Patient and family acknowledged receipt of this information and all questions were answered.

## 2022-07-05 NOTE — PLAN OF CARE
Significant events: Epinephrine decreased to 0.02 mcg/kg/min and lasix increased to 20 mg/hr. 500 PO Diamox administered. Cartwright D/C'd.   OOBTC ~ 7 hrs with 2 person assist.   Pain not well controlled, PRN medications adjusted.    Vitals/Respiratory: 4L NC.    15 ppm Nitric Oxide.    O2: >95%.   HR: 100-110 sinus tachycardia.   MAP: 7-85.  CVP: 24-22  Temperature max: 100.6 F.   Gtts:  Epinephrine at 0.02 mcg/kg/min, Dobutamine at 4 mcg/kg/min, Cardene at 2.5 mg/hr, Heparin, Lasix, and Insulin.  UOP: 1.1 L/shift. Now per urinal.        Last Bowel Movement: 7/5/22  x1.   LVAD: HM3. Speed 5200. Low speed 4800. Flow 4.3-4.7.   PI 3.1-4.5.   Power: 3.6-3.8.  Driveline exit site a 2 on assessment. Significant other, Niharika, educated on dressing change, practiced putting on sterile gloves.   Neuro: Pupils equal and reactive.  AAOx4, follows commands, and moves all extremities purposefully.    Diet:  Cardiac. 1.5 L Fluid restriction.   Labs/Accuchecks:  Potassium, Mag, and APTT Q6 hrs. ACHS.      Plan:   Continue diuresis. Continue ICU care. Plan of care reviewed with patient and significant other, all questions answered.      Skin:  Chest incision covered with prevena wound vac. Immerse mattress in use. OOBTC with chair cushion. Sacral foam in use. Weight shifting assistance provided. No new skin breakdown noted. Heels elevated with pillows.

## 2022-07-05 NOTE — TREATMENT PLAN
Hepatology Treatment Plan    Kevan Queen is a 37 y.o. male admitted to hospital 6/13/2022 (Hospital Day: 23) due to Acute on chronic combined systolic and diastolic congestive heart failure, NYHA class 4.     Interval History  Patient remains in SICU. Vital signs stable on nasal cannula and he is remains on Dobutamine and Epinephrine.Labs notable for on-going leukocytosis (22), stable normocytic anemia (Hgb 7.8), normal INR, and improving transaminitis (, ). Hyponatremia 131    Objective  Temp:  [98.4 °F (36.9 °C)-100.76 °F (38.2 °C)] 98.4 °F (36.9 °C) (07/05 1130)  Pulse:  [104-119] 112 (07/05 1300)  BP: (82-90)/(0) 84/0 (07/05 1130)  Resp:  [11-42] 27 (07/05 1300)  SpO2:  [84 %-100 %] 99 % (07/05 1215)  Arterial Line BP: ()/(65-85) 87/74 (07/05 1300)    Laboratory    Lab Results   Component Value Date    WBC 22.05 (H) 07/05/2022    HGB 7.8 (L) 07/05/2022    HCT 23.8 (L) 07/05/2022    MCV 88 07/05/2022     07/05/2022       Lab Results   Component Value Date     (L) 07/05/2022    K 3.9 07/05/2022    CL 88 (L) 07/05/2022    CO2 27 07/05/2022    BUN 41 (H) 07/05/2022    CREATININE 1.4 07/05/2022    CALCIUM 8.9 07/05/2022       Lab Results   Component Value Date    ALBUMIN 3.1 (L) 07/05/2022     (H) 07/05/2022     (H) 07/05/2022    ALKPHOS 119 07/05/2022    BILITOT 1.8 (H) 07/05/2022       Lab Results   Component Value Date    INR 1.4 (H) 07/05/2022    INR 1.2 07/04/2022    INR 1.1 07/03/2022       MELD-Na score: 21 at 7/5/2022  7:50 AM  MELD score: 16 at 7/5/2022  7:50 AM  Calculated from:  Serum Creatinine: 1.4 mg/dL at 7/5/2022  7:50 AM  Serum Sodium: 131 mmol/L at 7/5/2022  7:50 AM  Total Bilirubin: 1.8 mg/dL at 7/5/2022  7:50 AM  INR(ratio): 1.4 at 7/5/2022  3:10 AM  Age: 37 years    Assessment  This is a 37-year-old male with a PMH significant for non-ischemic cardiomyopathy with combined CHF (on home Milrinone and status post ICD placement), CKD III, and tobacco  abuse who was admitted to [facility] on 06/13/2022 for management of cardiogenic shock requiring IABP placement. Hospital course associated with onset of sepsis secondary to S.epidermidis bacteremia on 06/20. He is now status post LVAD placement on 06/29 with post-op course notable for onset of sepsis and transaminitis in hepatocellular pattern. He currently does not meet criteria for acute liver failure given absence of encephalopathy and INR <1.4. Work-up for underlying etiology is without evidence of Jose's disease, biliary or hepatic vasculature abnormalities. Etiology of liver enzyme abnormality is likely secondary to severe cardiomyopathy and critical illness.  AFP and A1 Antitrypsin AntiSmAb, AMA negative       Recommendations   - Follow-up hepatitis panel and auto-immune markers.   - Minimize use of medications that may precipitate encephalopathy (e.g. opiates and benzo's).   - Continued work-up and management of sepsis.   - Maintain normal hemodynamics.   - Recommend to discontinue NAC.   - Obtain CMP and INR daily.     Thank you for involving us in the care of Kevan Qeuen. Please call with any additional concerns or questions.        Marcos Bunch MD, PGY-V  Gastroenterology Fellow  [facility]

## 2022-07-05 NOTE — PROGRESS NOTES
07/05/2022  John Alaniz    Current provider:  Luis F Paige MD    Device interrogation:  TXP LVAD INTERROGATIONS 7/5/2022 7/5/2022 7/5/2022 7/5/2022 7/5/2022 7/5/2022 7/5/2022   Type HeartMate3 HeartMate3 HeartMate3 HeartMate3 HeartMate3 HeartMate3 HeartMate3   Flow 4.6 4.7 4.7 4.5 4.6 4.5 4.6   Speed 5200 5200 5200 5200 5200 5200 5200   PI 3.4 2.8 2.6 3.1 2.8 3.2 2.9   Power (Serna) 3.6 3.6 3.7 3.7 3.7 3.7 3.7   LSL 4800 4800 4800 4800 4800 4800 4800   Pulsatility Intermittent pulse - - - Intermittent pulse - -          Rounded on Kevan Bernice to ensure all mechanical assist device settings (IABP or VAD) were appropriate and all parameters were within limits.  I was able to ensure all back up equipment was present, the staff had no issues, and the Perfusion Department daily rounding was complete.      For implantable VADs: Interrogation of Ventricular assist device was performed with analysis of device parameters and review of device function. I have personally reviewed the interrogation findings and agree with findings as stated.     In emergency, the nursing units have been notified to contact the perfusion department either by:  Calling [phone] from 630am to 4pm Mon thru Fri, utilizing the On-Call Finder functionality of Epic and searching for Perfusion, or by contacting the hospital  from 4pm to 630am and on weekends and asking to speak with the perfusionist on call.    8:07 AM

## 2022-07-05 NOTE — PROGRESS NOTES
Pt lethargic while sitting up in chair with simeon at bedside.  LVAD history interrogated with no abnormal events noted.  LVAD numbers WNL. Approximately 20 minutes was spent with the patient providing education. Pt falling asleep during education, discussed with simeon and instructed to review with patient when more alert.    Patient and caregiver educated on below information:   VAD Binder given to patient to fill out. Reviewed with patient the workbook, encouraged patient to work through while learning. Also showed patient their Log Sheets, Educated on taking vital signs everyday and recording the results, recording the Coumadin level and dosages everyday, taking weight and monitoring daily, assisted patient in filling out today's information.    Caregiver verbalized understanding   Encouraged pt to notify nurse if they have any questions, problems or concerns for LVAD coordinator.  Pt verbalized understanding and in agreement of plan. Will follow up with pt soon.    PATIENT IS NOT CHECKED OFF ON ALARMS  CAREGIVER Page IS NOT CHECKED OFF ON DRESSING CHANGE

## 2022-07-05 NOTE — PROGRESS NOTES
VSS throughout shift. Temp max 100.8F on core bladder; 99.3F orally. Patient AAOx4; follows all commands and moves all extremities. Remains on 4L NC with 20ppm Marcella; SATs >95% overnight. Sinus tach with -110s. MAPs 75-85. CVPs 22, 20 and 20. UOP around 2L. Labs drawn per order. Potassium replaced per orders from Dr. Sanderson. Accuchecks ACHS; PRN insulin administered. Cardiac diet with 1500ml FR. Very minimal PO fluid intake overnight. Plan of care reviewed with patient and spouse at bedside. All questions and concerns addressed and answered. No breakdown noted to skin. prevena wound vac in place. Foam in place on sacrum. OOBTC this AM x2 person assist.     Gtts:  Epi 0.03 mcg/kg/min   4 mcg/kg/min  Cardene OFF since OOBTC   Heparin 1700 units/hr  Lasix 15 mg/hr  Mucomyst 6.25 mg/kg/hr  Insulin 3 units/hr     HM3 VAD:  Speed 5200; LSL 4800  Flows 4.5-4.7  PI 2.6-3.4  Power 3.6-3.8

## 2022-07-05 NOTE — CONSULTS
Triple lumen PICC to right brachial vein.  39 cm in length, 36 cm arm circumference and 0 cm exposed.   Lot # [id number].

## 2022-07-06 LAB
ALBUMIN SERPL BCP-MCNC: 3.2 G/DL (ref 3.5–5.2)
ALP SERPL-CCNC: 147 U/L (ref 55–135)
ALT SERPL W/O P-5'-P-CCNC: 306 U/L (ref 10–44)
ANION GAP SERPL CALC-SCNC: 14 MMOL/L (ref 8–16)
APTT BLDCRRT: 48.3 SEC (ref 21–32)
APTT BLDCRRT: 50.4 SEC (ref 21–32)
APTT BLDCRRT: 54.3 SEC (ref 21–32)
AST SERPL-CCNC: 104 U/L (ref 10–40)
BASOPHILS # BLD AUTO: 0.05 K/UL (ref 0–0.2)
BASOPHILS NFR BLD: 0.2 % (ref 0–1.9)
BILIRUB SERPL-MCNC: 1.9 MG/DL (ref 0.1–1)
BNP SERPL-MCNC: 955 PG/ML (ref 0–99)
BUN SERPL-MCNC: 40 MG/DL (ref 6–20)
CALCIUM SERPL-MCNC: 9 MG/DL (ref 8.7–10.5)
CHLORIDE SERPL-SCNC: 86 MMOL/L (ref 95–110)
CO2 SERPL-SCNC: 28 MMOL/L (ref 23–29)
CREAT SERPL-MCNC: 1.6 MG/DL (ref 0.5–1.4)
CRP SERPL-MCNC: 142.8 MG/L (ref 0–8.2)
DIFFERENTIAL METHOD: ABNORMAL
EOSINOPHIL # BLD AUTO: 0.1 K/UL (ref 0–0.5)
EOSINOPHIL NFR BLD: 0.4 % (ref 0–8)
ERYTHROCYTE [DISTWIDTH] IN BLOOD BY AUTOMATED COUNT: 16.1 % (ref 11.5–14.5)
EST. GFR  (AFRICAN AMERICAN): >60 ML/MIN/1.73 M^2
EST. GFR  (NON AFRICAN AMERICAN): 54.2 ML/MIN/1.73 M^2
FIBRINOGEN PPP-MCNC: 790 MG/DL (ref 182–400)
GLUCOSE SERPL-MCNC: 142 MG/DL (ref 70–110)
HCT VFR BLD AUTO: 22.6 % (ref 40–54)
HGB BLD-MCNC: 7.2 G/DL (ref 14–18)
IMM GRANULOCYTES # BLD AUTO: 0.21 K/UL (ref 0–0.04)
IMM GRANULOCYTES NFR BLD AUTO: 1 % (ref 0–0.5)
INR PPP: 1.8 (ref 0.8–1.2)
LDH SERPL L TO P-CCNC: 597 U/L (ref 110–260)
LYMPHOCYTES # BLD AUTO: 2.1 K/UL (ref 1–4.8)
LYMPHOCYTES NFR BLD: 9.9 % (ref 18–48)
MAGNESIUM SERPL-MCNC: 2.4 MG/DL (ref 1.6–2.6)
MAGNESIUM SERPL-MCNC: 2.4 MG/DL (ref 1.6–2.6)
MAGNESIUM SERPL-MCNC: 2.5 MG/DL (ref 1.6–2.6)
MAGNESIUM SERPL-MCNC: 2.5 MG/DL (ref 1.6–2.6)
MCH RBC QN AUTO: 28.1 PG (ref 27–31)
MCHC RBC AUTO-ENTMCNC: 31.9 G/DL (ref 32–36)
MCV RBC AUTO: 88 FL (ref 82–98)
METHEMOGLOBIN: 0.3 % (ref 0–3)
MONOCYTES # BLD AUTO: 1.7 K/UL (ref 0.3–1)
MONOCYTES NFR BLD: 7.7 % (ref 4–15)
NEUTROPHILS # BLD AUTO: 17.4 K/UL (ref 1.8–7.7)
NEUTROPHILS NFR BLD: 80.8 % (ref 38–73)
NRBC BLD-RTO: 2 /100 WBC
PHOSPHATE SERPL-MCNC: 3.9 MG/DL (ref 2.7–4.5)
PLATELET # BLD AUTO: 241 K/UL (ref 150–450)
PMV BLD AUTO: 10.8 FL (ref 9.2–12.9)
POCT GLUCOSE: 143 MG/DL (ref 70–110)
POCT GLUCOSE: 169 MG/DL (ref 70–110)
POCT GLUCOSE: 204 MG/DL (ref 70–110)
POCT GLUCOSE: 238 MG/DL (ref 70–110)
POCT GLUCOSE: 263 MG/DL (ref 70–110)
POTASSIUM SERPL-SCNC: 3.7 MMOL/L (ref 3.5–5.1)
POTASSIUM SERPL-SCNC: 4.2 MMOL/L (ref 3.5–5.1)
POTASSIUM SERPL-SCNC: 4.2 MMOL/L (ref 3.5–5.1)
POTASSIUM SERPL-SCNC: 4.8 MMOL/L (ref 3.5–5.1)
POTASSIUM SERPL-SCNC: 4.9 MMOL/L (ref 3.5–5.1)
PROT SERPL-MCNC: 6.9 G/DL (ref 6–8.4)
PROTHROMBIN TIME: 18.1 SEC (ref 9–12.5)
RBC # BLD AUTO: 2.56 M/UL (ref 4.6–6.2)
SODIUM SERPL-SCNC: 128 MMOL/L (ref 136–145)
WBC # BLD AUTO: 21.55 K/UL (ref 3.9–12.7)

## 2022-07-06 PROCEDURE — 99292 CRITICAL CARE ADDL 30 MIN: CPT | Mod: ,,, | Performed by: INTERNAL MEDICINE

## 2022-07-06 PROCEDURE — 25000003 PHARM REV CODE 250: Performed by: STUDENT IN AN ORGANIZED HEALTH CARE EDUCATION/TRAINING PROGRAM

## 2022-07-06 PROCEDURE — 25000003 PHARM REV CODE 250: Performed by: NURSE PRACTITIONER

## 2022-07-06 PROCEDURE — 99232 SBSQ HOSP IP/OBS MODERATE 35: CPT | Mod: ,,, | Performed by: NURSE PRACTITIONER

## 2022-07-06 PROCEDURE — 20000000 HC ICU ROOM

## 2022-07-06 PROCEDURE — 85025 COMPLETE CBC W/AUTO DIFF WBC: CPT | Performed by: STUDENT IN AN ORGANIZED HEALTH CARE EDUCATION/TRAINING PROGRAM

## 2022-07-06 PROCEDURE — 86140 C-REACTIVE PROTEIN: CPT | Performed by: STUDENT IN AN ORGANIZED HEALTH CARE EDUCATION/TRAINING PROGRAM

## 2022-07-06 PROCEDURE — 63600175 PHARM REV CODE 636 W HCPCS

## 2022-07-06 PROCEDURE — 84100 ASSAY OF PHOSPHORUS: CPT | Performed by: THORACIC SURGERY (CARDIOTHORACIC VASCULAR SURGERY)

## 2022-07-06 PROCEDURE — 85610 PROTHROMBIN TIME: CPT | Performed by: STUDENT IN AN ORGANIZED HEALTH CARE EDUCATION/TRAINING PROGRAM

## 2022-07-06 PROCEDURE — 99291 CRITICAL CARE FIRST HOUR: CPT | Mod: ,,, | Performed by: ANESTHESIOLOGY

## 2022-07-06 PROCEDURE — 83735 ASSAY OF MAGNESIUM: CPT | Mod: 91

## 2022-07-06 PROCEDURE — 99291 PR CRITICAL CARE, E/M 30-74 MINUTES: ICD-10-PCS | Mod: 25,,, | Performed by: INTERNAL MEDICINE

## 2022-07-06 PROCEDURE — 83050 HGB METHEMOGLOBIN QUAN: CPT

## 2022-07-06 PROCEDURE — 94799 UNLISTED PULMONARY SVC/PX: CPT

## 2022-07-06 PROCEDURE — 97110 THERAPEUTIC EXERCISES: CPT

## 2022-07-06 PROCEDURE — 63600367 HC NITRIC OXIDE PER HOUR

## 2022-07-06 PROCEDURE — 99292 CRITICAL CARE ADDL 30 MIN: CPT | Mod: ,,, | Performed by: ANESTHESIOLOGY

## 2022-07-06 PROCEDURE — 97535 SELF CARE MNGMENT TRAINING: CPT

## 2022-07-06 PROCEDURE — 25000003 PHARM REV CODE 250: Performed by: THORACIC SURGERY (CARDIOTHORACIC VASCULAR SURGERY)

## 2022-07-06 PROCEDURE — 83615 LACTATE (LD) (LDH) ENZYME: CPT | Performed by: THORACIC SURGERY (CARDIOTHORACIC VASCULAR SURGERY)

## 2022-07-06 PROCEDURE — 63600175 PHARM REV CODE 636 W HCPCS: Mod: JG

## 2022-07-06 PROCEDURE — 84132 ASSAY OF SERUM POTASSIUM: CPT | Mod: 91

## 2022-07-06 PROCEDURE — 63600175 PHARM REV CODE 636 W HCPCS: Performed by: STUDENT IN AN ORGANIZED HEALTH CARE EDUCATION/TRAINING PROGRAM

## 2022-07-06 PROCEDURE — 83735 ASSAY OF MAGNESIUM: CPT

## 2022-07-06 PROCEDURE — 99292 PR CRITICAL CARE, ADDL 30 MIN: ICD-10-PCS | Mod: ,,, | Performed by: ANESTHESIOLOGY

## 2022-07-06 PROCEDURE — 82803 BLOOD GASES ANY COMBINATION: CPT

## 2022-07-06 PROCEDURE — 80053 COMPREHEN METABOLIC PANEL: CPT

## 2022-07-06 PROCEDURE — 25000003 PHARM REV CODE 250

## 2022-07-06 PROCEDURE — 94761 N-INVAS EAR/PLS OXIMETRY MLT: CPT

## 2022-07-06 PROCEDURE — 25000003 PHARM REV CODE 250: Performed by: INTERNAL MEDICINE

## 2022-07-06 PROCEDURE — 99900035 HC TECH TIME PER 15 MIN (STAT)

## 2022-07-06 PROCEDURE — 99232 PR SUBSEQUENT HOSPITAL CARE,LEVL II: ICD-10-PCS | Mod: ,,, | Performed by: NURSE PRACTITIONER

## 2022-07-06 PROCEDURE — 85730 THROMBOPLASTIN TIME PARTIAL: CPT | Performed by: STUDENT IN AN ORGANIZED HEALTH CARE EDUCATION/TRAINING PROGRAM

## 2022-07-06 PROCEDURE — 83880 ASSAY OF NATRIURETIC PEPTIDE: CPT | Performed by: STUDENT IN AN ORGANIZED HEALTH CARE EDUCATION/TRAINING PROGRAM

## 2022-07-06 PROCEDURE — 99292 PR CRITICAL CARE, ADDL 30 MIN: ICD-10-PCS | Mod: ,,, | Performed by: INTERNAL MEDICINE

## 2022-07-06 PROCEDURE — 85730 THROMBOPLASTIN TIME PARTIAL: CPT | Mod: 91

## 2022-07-06 PROCEDURE — 27000248 HC VAD-ADDITIONAL DAY

## 2022-07-06 PROCEDURE — 99291 CRITICAL CARE FIRST HOUR: CPT | Mod: 25,,, | Performed by: INTERNAL MEDICINE

## 2022-07-06 PROCEDURE — 37799 UNLISTED PX VASCULAR SURGERY: CPT

## 2022-07-06 PROCEDURE — 85384 FIBRINOGEN ACTIVITY: CPT | Performed by: STUDENT IN AN ORGANIZED HEALTH CARE EDUCATION/TRAINING PROGRAM

## 2022-07-06 PROCEDURE — P9045 ALBUMIN (HUMAN), 5%, 250 ML: HCPCS | Mod: JG

## 2022-07-06 PROCEDURE — 63600175 PHARM REV CODE 636 W HCPCS: Performed by: INTERNAL MEDICINE

## 2022-07-06 PROCEDURE — A4216 STERILE WATER/SALINE, 10 ML: HCPCS | Performed by: THORACIC SURGERY (CARDIOTHORACIC VASCULAR SURGERY)

## 2022-07-06 PROCEDURE — 99291 PR CRITICAL CARE, E/M 30-74 MINUTES: ICD-10-PCS | Mod: ,,, | Performed by: ANESTHESIOLOGY

## 2022-07-06 PROCEDURE — 27000221 HC OXYGEN, UP TO 24 HOURS

## 2022-07-06 RX ORDER — ALBUMIN HUMAN 50 G/1000ML
12.5 SOLUTION INTRAVENOUS ONCE
Status: COMPLETED | OUTPATIENT
Start: 2022-07-06 | End: 2022-07-06

## 2022-07-06 RX ADMIN — HEPARIN SODIUM 1500 UNITS/HR: 5000 INJECTION INTRAVENOUS; SUBCUTANEOUS at 04:07

## 2022-07-06 RX ADMIN — FUROSEMIDE 20 MG/HR: 10 INJECTION, SOLUTION INTRAMUSCULAR; INTRAVENOUS at 01:07

## 2022-07-06 RX ADMIN — TIZANIDINE 2 MG: 2 TABLET ORAL at 02:07

## 2022-07-06 RX ADMIN — INSULIN ASPART 12 UNITS: 100 INJECTION, SOLUTION INTRAVENOUS; SUBCUTANEOUS at 11:07

## 2022-07-06 RX ADMIN — INSULIN ASPART 4 UNITS: 100 INJECTION, SOLUTION INTRAVENOUS; SUBCUTANEOUS at 08:07

## 2022-07-06 RX ADMIN — FUROSEMIDE 20 MG/HR: 10 INJECTION, SOLUTION INTRAMUSCULAR; INTRAVENOUS at 10:07

## 2022-07-06 RX ADMIN — AMLODIPINE BESYLATE 10 MG: 10 TABLET ORAL at 08:07

## 2022-07-06 RX ADMIN — TIZANIDINE 2 MG: 2 TABLET ORAL at 09:07

## 2022-07-06 RX ADMIN — POTASSIUM BICARBONATE 50 MEQ: 978 TABLET, EFFERVESCENT ORAL at 08:07

## 2022-07-06 RX ADMIN — FUROSEMIDE 20 MG/HR: 10 INJECTION, SOLUTION INTRAMUSCULAR; INTRAVENOUS at 06:07

## 2022-07-06 RX ADMIN — SODIUM CHLORIDE TAB 1 GM 1 G: 1 TAB at 08:07

## 2022-07-06 RX ADMIN — EPINEPHRINE 0.06 MCG/KG/MIN: 1 INJECTION INTRAMUSCULAR; INTRAVENOUS; SUBCUTANEOUS at 11:07

## 2022-07-06 RX ADMIN — Medication 10 ML: at 12:07

## 2022-07-06 RX ADMIN — ALBUMIN (HUMAN) 12.5 G: 12.5 SOLUTION INTRAVENOUS at 07:07

## 2022-07-06 RX ADMIN — INSULIN HUMAN 1.6 UNITS/HR: 1 INJECTION, SOLUTION INTRAVENOUS at 08:07

## 2022-07-06 RX ADMIN — HYDROMORPHONE HYDROCHLORIDE 0.2 MG: 1 INJECTION, SOLUTION INTRAMUSCULAR; INTRAVENOUS; SUBCUTANEOUS at 08:07

## 2022-07-06 RX ADMIN — POTASSIUM BICARBONATE 50 MEQ: 978 TABLET, EFFERVESCENT ORAL at 03:07

## 2022-07-06 RX ADMIN — HYDROMORPHONE HYDROCHLORIDE 0.2 MG: 1 INJECTION, SOLUTION INTRAMUSCULAR; INTRAVENOUS; SUBCUTANEOUS at 10:07

## 2022-07-06 RX ADMIN — INSULIN ASPART 12 UNITS: 100 INJECTION, SOLUTION INTRAVENOUS; SUBCUTANEOUS at 08:07

## 2022-07-06 RX ADMIN — INSULIN ASPART 2 UNITS: 100 INJECTION, SOLUTION INTRAVENOUS; SUBCUTANEOUS at 05:07

## 2022-07-06 RX ADMIN — HEPARIN SODIUM 1400 UNITS/HR: 5000 INJECTION INTRAVENOUS; SUBCUTANEOUS at 10:07

## 2022-07-06 RX ADMIN — POLYETHYLENE GLYCOL 3350 17 G: 17 POWDER, FOR SOLUTION ORAL at 08:07

## 2022-07-06 RX ADMIN — SODIUM CHLORIDE TAB 1 GM 1 G: 1 TAB at 09:07

## 2022-07-06 RX ADMIN — TRAMADOL HYDROCHLORIDE 50 MG: 50 TABLET, COATED ORAL at 12:07

## 2022-07-06 RX ADMIN — TIZANIDINE 2 MG: 2 TABLET ORAL at 06:07

## 2022-07-06 RX ADMIN — INSULIN ASPART 4 UNITS: 100 INJECTION, SOLUTION INTRAVENOUS; SUBCUTANEOUS at 11:07

## 2022-07-06 RX ADMIN — HYDROMORPHONE HYDROCHLORIDE 0.2 MG: 1 INJECTION, SOLUTION INTRAMUSCULAR; INTRAVENOUS; SUBCUTANEOUS at 04:07

## 2022-07-06 RX ADMIN — INSULIN ASPART 12 UNITS: 100 INJECTION, SOLUTION INTRAVENOUS; SUBCUTANEOUS at 05:07

## 2022-07-06 RX ADMIN — WARFARIN SODIUM 6 MG: 5 TABLET ORAL at 04:07

## 2022-07-06 RX ADMIN — DAPTOMYCIN 750 MG: 350 INJECTION, POWDER, LYOPHILIZED, FOR SOLUTION INTRAVENOUS at 12:07

## 2022-07-06 RX ADMIN — Medication 10 ML: at 05:07

## 2022-07-06 RX ADMIN — Medication 324 MG: at 08:07

## 2022-07-06 RX ADMIN — Medication 10 ML: at 06:07

## 2022-07-06 RX ADMIN — TRAMADOL HYDROCHLORIDE 50 MG: 50 TABLET, COATED ORAL at 06:07

## 2022-07-06 RX ADMIN — TRAMADOL HYDROCHLORIDE 50 MG: 50 TABLET, COATED ORAL at 07:07

## 2022-07-06 NOTE — SUBJECTIVE & OBJECTIVE
"Interval HPI:   Overnight events: No acute events overnight. Patient on the SICU in room 89695 CVICU/44509 CVICU A. Blood glucose improving. BG at and below goal on current insulin regimen (Transition Insulin Drip). Of note patient fell off tgtt drip protocol, resumed this monring due to FBG above goal. Steroid use- None. 6 Days Post-Op  Renal function- Abnormal - Creatinine 1.6   Vasopressors-  Epinephrine     Eating: Diet Cardiac [facility]; Consistent Carbohydrate; Isolation Tray - Regular [place]; Fluid - 1500mL  100%  Nausea: No  Hypoglycemia and intervention: No  Fever: No  TPN and/or TF: No      BP (!) 78/0 (BP Location: Left arm, Patient Position: Sitting)   Pulse 97   Temp 98.2 °F (36.8 °C) (Oral)   Resp 14   Ht 6' 3" (1.905 m)   Wt 93.7 kg (206 lb 9.1 oz)   SpO2 95%   BMI 25.82 kg/m²     Labs Reviewed and Include    Recent Labs   Lab 07/06/22  0402   *   CALCIUM 9.0   ALBUMIN 3.2*   PROT 6.9   *   K 4.9  4.8   CO2 28   CL 86*   BUN 40*   CREATININE 1.6*   ALKPHOS 147*   *   *   BILITOT 1.9*     Lab Results   Component Value Date    WBC 21.55 (H) 07/06/2022    HGB 7.2 (L) 07/06/2022    HCT 22.6 (L) 07/06/2022    MCV 88 07/06/2022     07/06/2022     No results for input(s): TSH, FREET4 in the last 168 hours.  Lab Results   Component Value Date    HGBA1C 9.2 (H) 05/20/2022       Nutritional status:   Body mass index is 25.82 kg/m².  Lab Results   Component Value Date    ALBUMIN 3.2 (L) 07/06/2022    ALBUMIN 3.1 (L) 07/05/2022    ALBUMIN 3.1 (L) 07/05/2022     Lab Results   Component Value Date    PREALBUMIN 19 (L) 06/30/2022    PREALBUMIN 36 04/18/2022       Estimated Creatinine Clearance: 75.6 mL/min (A) (based on SCr of 1.6 mg/dL (H)).    Accu-Checks  Recent Labs     07/04/22  1557 07/04/22  1715 07/04/22  2055 07/05/22  0314 07/05/22  0746 07/05/22  1137 07/05/22  1714 07/05/22 2053 07/05/22 2125 07/06/22  0400   POCTGLUCOSE 310* 343* 246* 201* 228* 201* 117* " 98 104 143*       Current Medications and/or Treatments Impacting Glycemic Control  Immunotherapy:    Immunosuppressants       None          Steroids:   Hormones (From admission, onward)                None          Pressors:    Autonomic Drugs (From admission, onward)                Start     Stop Route Frequency Ordered    06/29/22 1915  EPINEPHrine (ADRENALIN) 5 mg in dextrose 5 % 250 mL infusion         -- IV Continuous 06/29/22 1904          Hyperglycemia/Diabetes Medications:   Antihyperglycemics (From admission, onward)                Start     Stop Route Frequency Ordered    07/06/22 0745  insulin regular in 0.9 % NaCl 100 unit/100 mL (1 unit/mL) infusion        Question:  Enter initial dose (Units/hr):  Answer:  1.6    -- IV Continuous 07/06/22 0736    07/05/22 1645  insulin aspart U-100 pen 12 Units         -- SubQ 3 times daily with meals 07/05/22 1440    07/01/22 1833  insulin aspart U-100 pen 0-10 Units         -- SubQ As needed (PRN) 07/01/22 8462

## 2022-07-06 NOTE — PROGRESS NOTES
Pt awake but sleepy while sitting up in bed with fiance Page at bedside.  LVAD history interrogated with no abnormal events noted.  LVAD numbers WNL. Approximately 20 minutes was spent with the patient and caregiver. Pt is not appropriate for education at this time. Will f/u soon.     PATIENT IS NOT CHECKED OFF ON ALARMS  CAREGIVER Page IS NOT CHECKED OFF ON DRESSING CHANGE

## 2022-07-06 NOTE — ASSESSMENT & PLAN NOTE
Titrate insulin slowly to avoid hypoglycemia as the risk of hypoglycemia increases with decreased creatinine clearance.    Estimated Creatinine Clearance: 75.6 mL/min (A) (based on SCr of 1.6 mg/dL (H)).

## 2022-07-06 NOTE — PROGRESS NOTES
07/06/2022  Ruma Li    Current provider:  Luis F Paige MD    Device interrogation:  TXP LVAD INTERROGATIONS 7/6/2022 7/6/2022 7/6/2022 7/6/2022 7/6/2022 7/6/2022 7/6/2022   Type HeartMate3 HeartMate3 HeartMate3 HeartMate3 HeartMate3 HeartMate3 HeartMate3   Flow 4.6 4.6 4.4 4.5 4.5 4.4 4.3   Speed 5200 5200 5200 5200 5200 5200 5200   PI 3.4 2.9 3.8 3.4 3.5 4 3.5   Power (Serna) 3.7 3.6 3.7 3.6 3.6 3.6 3.7   LSL - 4800 - - - 4800 -   Pulsatility - - - - - - -          Rounded on Kevanmohit Queen to ensure all mechanical assist device settings (IABP or VAD) were appropriate and all parameters were within limits.  I was able to ensure all back up equipment was present, the staff had no issues, and the Perfusion Department daily rounding was complete.      For implantable VADs: Interrogation of Ventricular assist device was performed with analysis of device parameters and review of device function. I have personally reviewed the interrogation findings and agree with findings as stated.     In emergency, the nursing units have been notified to contact the perfusion department either by:  Calling [email or web address] from 630am to 4pm Mon thru Fri, utilizing the On-Call Finder functionality of Epic and searching for Perfusion, or by contacting the hospital  from 4pm to 630am and on weekends and asking to speak with the perfusionist on call.    8:52 AM

## 2022-07-06 NOTE — ASSESSMENT & PLAN NOTE
Endocrinology consulted for BG management.   BG goal 140-180    - Transition insulin infusion at 1.6 u/hr with step-down parameters. (0.8 units/kg/day)  - Novolog 12 units TIDWM and prn for BG excursions [facility] (150/25) SSI.   - BG monitoring ac/hs/0200 and moderate dose correction scale.   - Hypoglycemia protocol in place.     ** Please notify Endocrine for any change and/or advance in diet**  ** Please call Endocrine for any BG related issues **    Discharge Planning:   TBD. Please notify endocrinology prior to discharge.

## 2022-07-06 NOTE — PROGRESS NOTES
"[facility]  Endocrinology  Progress Note    Admit Date: 6/13/2022     Reason for Consult: Management of  type 2 DM, Hyperglycemia     Surgical Procedure and Date: none    Diabetes diagnosis year: 4/21/21    Home Diabetes Medications:  Detemir  23  units daily and Aspart   12  units TIDWM and  Mod dose correction insulin    Lab Results   Component Value Date    HGBA1C 9.2 (H) 05/20/2022           How often checking glucose at home? 1-3 x day   BG readings on regimen: 180- up to 300 once  Hypoglycemia on the regimen?  yes once- related to not really eating after taking aspart   Missed doses on regimen?  no    Diabetes Complications include:     Hyperglycemia    Complicating diabetes co morbidities:   History of MI, CHF, and CKD      HPI:   Patient is a 37 y.o. male with a diagnosis of type 2 DM and NIDCM with BiV systolic heart failure. Patient was presented at [facility] 4/2/22  and was  approved for VAD. Also recently admitted with [facility]; he had clinic appointment last week to f/u recent admit for hyperglycemic hyperosmolar syndrome but did not come as he was "feeling too bad" presents to  ED with SOB. Endocrinology consulted for BG/ DM management.                Interval HPI:   Overnight events: No acute events overnight. Patient on the SICU in room 81770 CVICU/02072 CVICU A. Blood glucose improving. BG at and below goal on current insulin regimen (Transition Insulin Drip). Of note patient fell off tgtt drip protocol, resumed this monring due to FBG above goal. Steroid use- None. 6 Days Post-Op  Renal function- Abnormal - Creatinine 1.6   Vasopressors-  Epinephrine     Eating: Diet [facility]; Consistent Carbohydrate; Isolation Tray - Regular [place]; Fluid - 1500mL  100%  Nausea: No  Hypoglycemia and intervention: No  Fever: No  TPN and/or TF: No      BP (!) 78/0 (BP Location: Left arm, Patient Position: Sitting)   Pulse 97   Temp 98.2 °F (36.8 °C) (Oral)   Resp 14   Ht 6' 3" " (1.905 m)   Wt 93.7 kg (206 lb 9.1 oz)   SpO2 95%   BMI 25.82 kg/m²     Labs Reviewed and Include    Recent Labs   Lab 07/06/22 0402   *   CALCIUM 9.0   ALBUMIN 3.2*   PROT 6.9   *   K 4.9  4.8   CO2 28   CL 86*   BUN 40*   CREATININE 1.6*   ALKPHOS 147*   *   *   BILITOT 1.9*     Lab Results   Component Value Date    WBC 21.55 (H) 07/06/2022    HGB 7.2 (L) 07/06/2022    HCT 22.6 (L) 07/06/2022    MCV 88 07/06/2022     07/06/2022     No results for input(s): TSH, FREET4 in the last 168 hours.  Lab Results   Component Value Date    HGBA1C 9.2 (H) 05/20/2022       Nutritional status:   Body mass index is 25.82 kg/m².  Lab Results   Component Value Date    ALBUMIN 3.2 (L) 07/06/2022    ALBUMIN 3.1 (L) 07/05/2022    ALBUMIN 3.1 (L) 07/05/2022     Lab Results   Component Value Date    PREALBUMIN 19 (L) 06/30/2022    PREALBUMIN 36 04/18/2022       Estimated Creatinine Clearance: 75.6 mL/min (A) (based on SCr of 1.6 mg/dL (H)).    Accu-Checks  Recent Labs     07/04/22  1557 07/04/22  1715 07/04/22  2055 07/05/22  0314 07/05/22  0746 07/05/22  1137 07/05/22  1714 07/05/22  2053 07/05/22  2125 07/06/22  0400   POCTGLUCOSE 310* 343* 246* 201* 228* 201* 117* 98 104 143*       Current Medications and/or Treatments Impacting Glycemic Control  Immunotherapy:    Immunosuppressants       None          Steroids:   Hormones (From admission, onward)                None          Pressors:    Autonomic Drugs (From admission, onward)                Start     Stop Route Frequency Ordered    06/29/22 1915  EPINEPHrine (ADRENALIN) 5 mg in dextrose 5 % 250 mL infusion         -- IV Continuous 06/29/22 1904          Hyperglycemia/Diabetes Medications:   Antihyperglycemics (From admission, onward)                Start     Stop Route Frequency Ordered    07/06/22 7803  insulin regular in 0.9 % NaCl 100 unit/100 mL (1 unit/mL) infusion        Question:  Enter initial dose (Units/hr):  Answer:  1.6    -- IV  Continuous 07/06/22 0736    07/05/22 1645  insulin aspart U-100 pen 12 Units         -- SubQ 3 times daily with meals 07/05/22 1440    07/01/22 1833  insulin aspart U-100 pen 0-10 Units         -- SubQ As needed (PRN) 07/01/22 1733            ASSESSMENT and PLAN    * LVAD (left ventricular assist device) present  Managed per primary team  Avoid hypoglycemia        Uncontrolled diabetes mellitus  Endocrinology consulted for BG management.   BG goal 140-180    - Transition insulin infusion at 1.6 u/hr with step-down parameters. (0.8 units/kg/day)  - Novolog 12 units TIDWM and prn for BG excursions [facility] (150/25) SSI.   - BG monitoring ac/hs/0200 and moderate dose correction scale.   - Hypoglycemia protocol in place.     ** Please notify Endocrine for any change and/or advance in diet**  ** Please call Endocrine for any BG related issues **    Discharge Planning:   TBD. Please notify endocrinology prior to discharge.        Acute on chronic combined systolic and diastolic congestive heart failure, NYHA class 4  Optimize glucose control and  avoid hypoglycemia    Managed per primary.       CKD (chronic kidney disease) stage 3, GFR 30-59 ml/min    Titrate insulin slowly to avoid hypoglycemia as the risk of hypoglycemia increases with decreased creatinine clearance.    Estimated Creatinine Clearance: 75.6 mL/min (A) (based on SCr of 1.6 mg/dL (H)).           Michael Wyman, DNP, FNP  Endocrinology  [facility]

## 2022-07-06 NOTE — PROGRESS NOTES
[facility]  Heart Transplant  Progress Note    Patient Name: Kevan Queen  MRN: [id number]  Admission Date: 6/13/2022  Hospital Length of Stay: 23 days  Attending Physician: Luis F Paige MD  Primary Care Provider: ORALIA Cline  Principal Problem:LVAD (left ventricular assist device) present    Subjective:     **Interval History: No acute events overnight. PICC placed to right brachial vein.  Last bowel movement was 7/5.  He is still volume overloaded despite increasing Lasix drip to 20mg/hr.  Would increase Lasix again vs adding Metolazone  and would increase  to 5 today.     Continuous Infusions:   sodium chloride 0.9% 5 mL/hr at 06/27/22 0055    sodium chloride 0.9% Stopped (07/05/22 1314)    EPINEPHrine 0.06 mcg/kg/min (07/06/22 1127)    furosemide (LASIX) 2 mg/mL continuous infusion (non-titrating) 20 mg/hr (07/06/22 1100)    heparin (porcine) in 5 % dex 1,400 Units/hr (07/06/22 1100)    insulin regular 1 units/mL infusion orderable (TRANSFER) 1.6 Units/hr (07/06/22 0809)    nicardipine Stopped (07/05/22 2204)    nitric oxide gas       Scheduled Meds:   albumin human 5%  25 g Intravenous Once    amLODIPine  10 mg Oral Daily    DAPTOmycin (CUBICIN)  IV  8 mg/kg Intravenous Q24H    ferrous gluconate  324 mg Oral Daily with breakfast    insulin aspart U-100  12 Units Subcutaneous TIDWM    INV aspirin/placebo  100 mg Oral Daily    LIDOcaine  1 patch Transdermal Q24H    polyethylene glycol  17 g Per NG tube Daily    potassium bicarbonate  50 mEq Oral BID    sodium chloride 0.9%  10 mL Intravenous Q6H    sodium chloride  1 g Oral BID    tiZANidine  2 mg Oral Q8H    warfarin  6 mg Oral Daily     PRN Meds:albumin human 5%, albuterol sulfate, bisacodyL, dextrose 10%, dextrose 10%, glucagon (human recombinant), glucose, glucose, hydrALAZINE, HYDROmorphone, insulin aspart U-100, magnesium hydroxide 400 mg/5 ml, magnesium sulfate IVPB, potassium chloride, potassium  chloride in water, sodium chloride 0.9%, sodium chloride 0.9%, Flushing PICC Protocol **AND** sodium chloride 0.9% **AND** sodium chloride 0.9%, traMADoL    Review of patient's allergies indicates:   Allergen Reactions    Aspirin Other (See Comments)     Mr. Thacker is enrolled in Dr. Paige's Alon Trial and cannot have any aspirin and/or aspirin-containing products. DO NOT cancel any orders for INV Aspirin 100 mg/Placebo. If you have questions, please contact Isabel @ [phone], [phone], secure chat or MS Teams message.    Bumex [bumetanide] Hives    Lactose Diarrhea     Other reaction(s): Abdominal distension, gaseous    Torsemide Hives     Objective:     Vital Signs (Most Recent):  Temp: 98.2 °F (36.8 °C) (07/06/22 0500)  Pulse: 99 (07/06/22 1115)  Resp: 20 (07/06/22 1115)  BP: (!) 78/0 (07/06/22 0715)  SpO2: 95 % (07/06/22 0743)   Vital Signs (24h Range):  Temp:  [97.9 °F (36.6 °C)-98.9 °F (37.2 °C)] 98.2 °F (36.8 °C)  Pulse:  [] 99  Resp:  [8-37] 20  SpO2:  [81 %-100 %] 95 %  BP: (78-98)/(0-73) 78/0  Arterial Line BP: ()/(52-82) 85/72     Patient Vitals for the past 72 hrs (Last 3 readings):   Weight   07/05/22 1358 93.7 kg (206 lb 9.1 oz)       Body mass index is 25.82 kg/m².      Intake/Output Summary (Last 24 hours) at 7/6/2022 1130  Last data filed at 7/6/2022 1100  Gross per 24 hour   Intake 2537.83 ml   Output 3450 ml   Net -912.17 ml         Hemodynamic Parameters:       Telemetry: ST    Physical Exam  Constitutional:       Comments: Sitting in chair NAD   HENT:      Head: Normocephalic and atraumatic.   Eyes:      Conjunctiva/sclera: Conjunctivae normal.      Pupils: Pupils are equal, round, and reactive to light.   Neck:      Comments: JVD elevation to upper neck   Cardiovascular:      Rate and Rhythm: Regular rhythm. Tachycardia present.      Comments: Smooth VAD hum  Pulmonary:      Breath sounds: Normal breath sounds.   Abdominal:      Palpations: Abdomen  is soft.      Comments: Normal bowel sounds   Musculoskeletal:         General: No swelling.      Cervical back: Neck supple.   Skin:     General: Skin is dry.      Capillary Refill: Capillary refill takes 2 to 3 seconds.      Comments: Extremities cool to touch with fans       Significant Labs:  CBC:  Recent Labs   Lab 07/04/22 0301 07/05/22 0310 07/06/22  0402   WBC 24.46* 22.05* 21.55*   RBC 2.79* 2.72* 2.56*   HGB 7.9* 7.8* 7.2*   HCT 24.4* 23.8* 22.6*    216 241   MCV 88 88 88   MCH 28.3 28.7 28.1   MCHC 32.4 32.8 31.9*       BNP:  Recent Labs   Lab 07/01/22  0411 07/04/22 0301 07/06/22  0402   * 776* 955*       CMP:  Recent Labs   Lab 07/05/22 0310 07/05/22  0750 07/05/22 1450 07/05/22 2055 07/06/22 0402 07/06/22  1000   * 206*  --   --  142*  --    CALCIUM 9.2 8.9  --   --  9.0  --    ALBUMIN 3.1* 3.1*  --   --  3.2*  --    PROT 6.9 6.8  --   --  6.9  --    * 131*  --   --  128*  --    K 3.8 3.9   < > 3.6 4.9  4.8 4.2   CO2 30* 27  --   --  28  --    CL 87* 88*  --   --  86*  --    BUN 42* 41*  --   --  40*  --    CREATININE 1.5* 1.4  --   --  1.6*  --    ALKPHOS 122 119  --   --  147*  --    * 392*  --   --  306*  --    * 114*  --   --  104*  --    BILITOT 1.8* 1.8*  --   --  1.9*  --     < > = values in this interval not displayed.        Coagulation:   Recent Labs   Lab 07/04/22 0301 07/04/22  0643 07/05/22 0310 07/05/22  0609 07/05/22 1450 07/05/22  2055 07/06/22  0402   INR 1.2  --  1.4*  --   --   --  1.8*   APTT  --    < >  --    < > 46.7* 59.2* 54.3*    < > = values in this interval not displayed.       LDH:  Recent Labs   Lab 07/04/22  0301 07/05/22  0310 07/06/22  0402   * 686* 597*       Microbiology:  Microbiology Results (last 7 days)       Procedure Component Value Units Date/Time    Blood culture [[id number]] Collected: 06/30/22 1836    Order Status: Completed Specimen: Blood from Peripheral, Wrist, Left Updated: 07/05/22 2012     Blood  Culture, Routine No growth after 5 days.    Blood culture [[id number]] Collected: 06/30/22 1833    Order Status: Completed Specimen: Blood from Peripheral, Forearm, Right Updated: 07/05/22 2012     Blood Culture, Routine No growth after 5 days.    Blood culture [[id number]] Collected: 07/02/22 1618    Order Status: Completed Specimen: Blood from Peripheral, Antecubital, Right Updated: 07/05/22 1812     Blood Culture, Routine No Growth to date      No Growth to date      No Growth to date      No Growth to date    Blood culture [[id number]] Collected: 07/02/22 1629    Order Status: Completed Specimen: Blood from Peripheral, Hand, Right Updated: 07/05/22 1812     Blood Culture, Routine No Growth to date      No Growth to date      No Growth to date      No Growth to date    Blood culture [[id number]]  (Abnormal)  (Susceptibility) Collected: 06/23/22 0108    Order Status: Completed Specimen: Blood from Peripheral, Hand, Right Updated: 07/01/22 1137     Blood Culture, Routine Gram stain dudley bottle: Gram positive cocci in clusters resembling Staph       Results called to and read back by: Mark Pickett RN  06/24/2022        14:36      STAPHYLOCOCCUS EPIDERMIDIS    Blood culture [[id number]] Collected: 06/25/22 0634    Order Status: Completed Specimen: Blood from Peripheral, Wrist, Left Updated: 06/30/22 0812     Blood Culture, Routine No growth after 5 days.    Blood culture [[id number]] Collected: 06/25/22 0633    Order Status: Completed Specimen: Blood from Peripheral, Antecubital, Right Updated: 06/30/22 0812     Blood Culture, Routine No growth after 5 days.            I have reviewed all pertinent labs within the past 24 hours.    Estimated Creatinine Clearance: 75.6 mL/min (A) (based on SCr of 1.6 mg/dL (H)).    Diagnostic Results:  I have reviewed and interpreted all pertinent imaging results/findings within the past 24 hours.    Assessment and Plan:     38 yo male with NIDCM with BiV systolic heart failure, on home  "Milrinone at 0.375 mcg/kg/min, presented at [facility] 4/2/22 ,was not evaluated for OHT as he has recently quit smoking in April 2022 but was approved for VAD with plan to begin OHT evaluation in upcoming months if Mr Queen is stable and suitable for OHT eval (blood group A), 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 Went to ED at [facility] 6/10 but left after waiting 4 hours. Had clinic appointment with us today, but arrived to [facility] early this morning and decided to go to the ED instead. Baseline Lasix dose is 80 mg bid. Reports taking 240 mg qd past 3 days with no improvement. BNP is 1701, up from 898 on 6/2 and 49 on 5/24. sCr is 1.8 with baseline ~ 1.5-1.7. sPO2 on RA is 93%. Wife at bedside    He has been given Lasix 80 mg IVP in the ED with plan to start Lasix gtt at 20 mg/hr           * LVAD (left ventricular assist device) present  -S/P DT HM3 with MVR plus Protek Duo for RV support 6/29 (Grecia)  -Rvad removed with concern for hemolysis, Marcella increased to 20,  -Post op antibiotic per CTS (h/o Staph epi bacteremia, cleareed by ID for surgery with rec to continue antibiotic coverage for 2 weeks post op)  - GASTON trial   -Speed set at 5200   -Echo done 7/5 EF: 10%, LVEDD: 7.13,   -Would increase lasix drip (+ adding metolazone) and increase  to 5  -INR is 1.8 today (goal 2-3)     Procedure: Device Interrogation Including analysis of device parameters  Current Settings: Ventricular Assist Device  Review of device function is stable    TXP LVAD INTERROGATIONS 7/6/2022 7/6/2022 7/6/2022 7/6/2022 7/6/2022 7/6/2022 7/6/2022   Type HeartMate3 HeartMate3 HeartMate3 HeartMate3 HeartMate3 " HeartMate3 HeartMate3   Flow 4.8 4.6 4.7 4.6 4.6 4.4 4.5   Speed 5300 5300 5200 5200 5200 5200 5200   PI 3.3 3.2 2.5 3.4 2.9 3.8 3.4   Power (Serna) 3.8 3.8 3.8 3.7 3.6 3.7 3.6   LSL - 4900 - - 4800 - -   Pulsatility - - - - - - -       Acute on chronic combined systolic and diastolic congestive heart failure, NYHA class 4  - NID  - Was not evaluated for OHTx as he quit smoking in April 2022.   - Received HM3 on 6/29  - See LVAD      Staphylococcus epidermidis bacteremia  Blood cultures 6/20 and repeat 6/21 positive for Staph Epi. One of two blood cultures from 6/23 positive for Staph (taken prior to line exchange). Repeat cultures sent 6/25 NGTD.   -IJ exchanged on 6/23, IABP exchanged 6/23  -14 day course of vancomycin from VAD implantation on 6/29. End date: 7/12/22        CKD (chronic kidney disease) stage 3, GFR 30-59 ml/min  SCr baseline ~ 1.5-1.7      Uncontrolled diabetes mellitus  Admitted 5/24-5/27 with hyperglycemia hyperosmolar syndrome  -Hgb A1C 9.2 on 5/20/22  -Endocrine following, on insulin gtt      Uninterrupted Critical Care/Counseling Time (not including procedures): 60 minutes      DEYA Martinez  Heart Transplant  [facility]

## 2022-07-06 NOTE — PT/OT/SLP PROGRESS
"Occupational Therapy   Treatment    Name: Kevan Queen  MRN: [id number]  Admitting Diagnosis:  LVAD (left ventricular assist device) present  6 Days Post-Op    Recommendations:     Discharge Recommendations: rehabilitation facility  Discharge Equipment Recommendations:   (TBD)    Assessment:     Kevan Queen is a 37 y.o. male with a medical diagnosis of LVAD (left ventricular assist device) present.  Pt presents with good tolerance for therapy on this date and was motivated to participate in therapy. Pt tolerated a variety of stretches and exercises with VSS and without s/s. Pt unable to sit EOB d/t MD request related to hypotensive episodes (MAP in 50s) while seated in chair. Pt reported significant pain with LUE shoulder movement >10 degrees. Anticipate pt will progress well with continued OT services to address functional endurance and strength and will benefit from further OT services in a rehabilitation facility to further address the following deficits.     Performance deficits affecting function are weakness, impaired endurance, impaired self care skills, impaired functional mobilty, decreased upper extremity function, decreased lower extremity function, pain, decreased ROM, impaired cardiopulmonary response to activity.     Pt LVAD on wall power upon entry into room and remained on wall power throughout session d/t tasks completed in bed and not requiring battery power at this time.     Rehab Prognosis:  Good; patient would benefit from acute skilled OT services to address these deficits and reach maximum level of function.       Plan:     Patient to be seen 6 x/week to address the above listed problems via self-care/home management, therapeutic activities, therapeutic exercises  · Plan of Care Expires: 08/02/22  · Plan of Care Reviewed with: patient, significant other    Subjective   "I am going to scream if you make me move my arm any more"    Pain/Comfort:  · Pain Rating 1:  (Pt did not rate pain. Pt did " yell out when moving LUE shoulder greater than ~10 degrees)  · Location - Side 1: Left  · Location - Orientation 1: upper  · Location 1: shoulder  · Pain Addressed 1: Distraction, Reposition, Cessation of Activity    Objective:     Communicated with: Nsg prior to session.  Patient found supine with blood pressure cuff, arterial line, oxygen, peripheral IV, PICC line, central line (introducer) upon OT entry to room.    General Precautions: Standard, fall, LVAD, sternal   Orthopedic Precautions:N/A   Respiratory Status: NO with NC  Vital Signs:  Goals: MAP >65  MAP remained in 70s throughout session during therapeutic exercises.       Occupational Performance:     Bed Mobility:    · Not performed on this date d/t MD request related to hypotensive episode (MAP in 50s) while up in chair prior to therapy.    Activities of Daily Living:  · Not performed on this date per pt request and intended focus of therapy on this date was therapeutic exercise.      [facility] 6 Click ADL: 14    Treatment & Education:  Pt educated on OT POC, purpose of OT services and safety with ADLs and functional mobility.   Pt educated on:  - Importance and benefit of light, prolonged stretching of LUE d/t pt reported frozen shoulder  - Sternal precautions  - LVAD components (controller, power cords, drive line) for future communication with medical team following d/c  - Pt advocacy for care  - HEP   Pt educated on UE and LE exercises to improve functional strength and mobility in anticipation of progressing through functional tasks during hospital stay. Pt unable to perform functional mobility or EOB   activity d/t MD orders. LUE exercises significantly limited d/t frozen shoulder   Therapeutic exercises demonstrated, educated, and performed:    - UE: Shoulder flexion (RUE only); Chest press (RUE only); Bicep curls (both arms)   - LE: hip& knee flex via heel slide, SAQ, Hip AB/ADduction, ankle pumps    Pt educated to perform 10 reps 3x/day  - Daily  orientation (date and time of day provided per pt request)  - Family involvement with care (UE & LE exercises)  - Energy Conservation techniques (pursed-lip breathing, taking rest breaks prn)      Patient left HOB elevated with all lines intact, call button in reach, bed alarm off, nsg notified and spouse presentEducation:  . Nsg notified of L shoulder pain    GOALS:   Multidisciplinary Problems     Occupational Therapy Goals        Problem: Occupational Therapy    Goal Priority Disciplines Outcome Interventions   Occupational Therapy Goal     OT, PT/OT Ongoing, Progressing    Description: Goals to be met by: 8/2/22     Patient will increase functional independence with ADLs by performing:    UE Dressing with Modified Sharp.  LE Dressing with Modified Sharp and Assistive Devices as needed.  Grooming while standing at sink with Modified Sharp.  Toileting from toilet with Modified Sharp for hygiene and clothing management.   Bathing from  shower chair/bench with Modified Sharp.  Toilet transfer to toilet with Modified Sharp.  Increased functional strength to WFL for B UE.  Upper extremity exercise program x15 reps per handout, with assistance as needed.  Pt will be I in all LVAD management.                     Time Tracking:     OT Date of Treatment: 07/06/22  OT Start Time: 1046  OT Stop Time: 1124  OT Total Time (min): 38 min    Billable Minutes:Therapeutic Exercise 38               7/6/2022

## 2022-07-06 NOTE — SUBJECTIVE & OBJECTIVE
Interval History/Significant Events: No acute events overnight, however this morning had an episode of hypotension when being moved to the chair with MAPs down to the 40s. Patient returned to bed with MAPs sustaining in the 60s and a decrease in LVAD flows. Epi was increased and a bolus of 250 albumin given with return of MAPs to <75.     Follow-up For: Procedure(s) (LRB):  CLOSURE, WOUND, STERNUM (N/A)  INSERTION-RIGHT VENTRICULAR ASSIST DEVICE (Right)  APPLICATION, WOUND VAC (N/A)  IRRIGATION, MEDIASTINUM    Post-Operative Day: 6 Days Post-Op    Objective:     Vital Signs (Most Recent):  Temp: 98.2 °F (36.8 °C) (07/06/22 0500)  Pulse: 97 (07/06/22 0915)  Resp: 14 (07/06/22 0915)  BP: (!) 78/0 (07/06/22 0715)  SpO2: 95 % (07/06/22 0743)   Vital Signs (24h Range):  Temp:  [97.9 °F (36.6 °C)-100.4 °F (38 °C)] 98.2 °F (36.8 °C)  Pulse:  [] 97  Resp:  [8-36] 14  SpO2:  [81 %-100 %] 95 %  BP: (78-98)/(0-73) 78/0  Arterial Line BP: ()/(52-82) 88/75     Weight: 93.7 kg (206 lb 9.1 oz)  Body mass index is 25.82 kg/m².      Intake/Output Summary (Last 24 hours) at 7/6/2022 0937  Last data filed at 7/6/2022 0900  Gross per 24 hour   Intake 2709.75 ml   Output 3225 ml   Net -515.25 ml       Physical Exam  Vitals reviewed.   Constitutional:       General: He is not in acute distress.  HENT:      Head: Normocephalic and atraumatic.      Nose: Nose normal.      Mouth/Throat:      Mouth: Mucous membranes are moist.   Eyes:      Pupils: Pupils are equal, round, and reactive to light.   Neck:      Comments:     Cardiovascular:      Rate and Rhythm: Normal rate and regular rhythm.      Pulses: Normal pulses.      Comments: S/p chest closure. Incision c/d/I.        Pulmonary:      Effort: Pulmonary effort is normal. No respiratory distress.      Comments: On 4L NC  Abdominal:      General: Abdomen is flat. There is no distension.      Palpations: Abdomen is soft.   Musculoskeletal:      Comments: IABP site at left fem  with dressing in place   Skin:     General: Skin is warm and dry.      Capillary Refill: Capillary refill takes less than 2 seconds.   Neurological:      General: No focal deficit present.      Mental Status: He is alert and oriented to person, place, and time.       Vents:  Vent Mode: Spont (07/01/22 1205)  Ventilator Initiated: Yes (06/29/22 1527)  Set Rate: 18 BPM (07/01/22 0737)  Vt Set: 580 mL (07/01/22 0737)  Pressure Support: 8 cmH20 (07/01/22 1205)  PEEP/CPAP: 5 cmH20 (07/01/22 1205)  Oxygen Concentration (%): 36 (07/05/22 0328)  Peak Airway Pressure: 15 cmH2O (07/01/22 1205)  Plateau Pressure: 21 cmH20 (07/01/22 1205)  Total Ve: 17 mL (07/01/22 1205)  Negative Inspiratory Force (cm H2O): -13 (07/01/22 1205)  F/VT Ratio<105 (RSBI): (!) 70.48 (07/01/22 1205)    Lines/Drains/Airways       Peripherally Inserted Central Catheter Line  Duration             PICC Triple Lumen 07/05/22 1554 right brachial <1 day              Central Venous Catheter Line  Duration              Introducer with Double Lumen 06/29/22 1123 6 days    Percutaneous Central Line Insertion/Assessment - Triple Lumen  06/29/22 1200 left internal jugular 6 days              Arterial Line  Duration             Arterial Line 06/29/22 0832 Left Brachial 7 days              Line  Duration                  VAD 06/29/22 1115 Left ventricular assist device HeartMate 3 6 days              Peripheral Intravenous Line  Duration                  Peripheral IV - Single Lumen 07/02/22 2226 20 G Anterior;Distal;Left Forearm 3 days                    Significant Labs:    CBC/Anemia Profile:  Recent Labs   Lab 07/05/22  0310 07/06/22  0402   WBC 22.05* 21.55*   HGB 7.8* 7.2*   HCT 23.8* 22.6*    241   MCV 88 88   RDW 15.4* 16.1*        Chemistries:  Recent Labs   Lab 07/05/22  0310 07/05/22  0750 07/05/22  1450 07/05/22  2055 07/06/22  0402   * 131*  --   --  128*   K 3.8 3.9 3.9 3.6 4.9  4.8   CL 87* 88*  --   --  86*   CO2 30* 27  --   --  28    BUN 42* 41*  --   --  40*   CREATININE 1.5* 1.4  --   --  1.6*   CALCIUM 9.2 8.9  --   --  9.0   ALBUMIN 3.1* 3.1*  --   --  3.2*   PROT 6.9 6.8  --   --  6.9   BILITOT 1.8* 1.8*  --   --  1.9*   ALKPHOS 122 119  --   --  147*   * 392*  --   --  306*   * 114*  --   --  104*   MG 2.4  --  2.7* 2.4 2.5   PHOS 3.1  --   --   --  3.9       ABGs:   Recent Labs   Lab 07/05/22  2130   PH 7.462*   PCO2 37.9   HCO3 27.1   POCSATURATED 98   BE 3     Coagulation:   Recent Labs   Lab 07/06/22  0402   INR 1.8*   APTT 54.3*     All pertinent labs within the past 24 hours have been reviewed.    Significant Imaging:  I have reviewed all pertinent imaging results/findings within the past 24 hours.

## 2022-07-06 NOTE — ASSESSMENT & PLAN NOTE
- NIDCM  - Was not evaluated for OHTx as he quit smoking in April 2022.   - Received HM3 on 6/29  - See LVAD

## 2022-07-06 NOTE — TREATMENT PLAN
Hepatology Treatment Plan    Kevan Queen is a 37 y.o. male admitted to hospital 6/13/2022 (Hospital Day: 24) due to LVAD (left ventricular assist device) present.     Interval History  Down trending liver enzymes   Dobutamine stopped currently on epinephrine only   Worsening hyponatremia 128 today   NAC discontinued yesterday     Objective  Temp:  [97.9 °F (36.6 °C)-98.9 °F (37.2 °C)] 98.2 °F (36.8 °C) (07/06 0500)  Pulse:  [] 92 (07/06 1045)  BP: (78-98)/(0-73) 78/0 (07/06 0715)  Resp:  [8-36] 10 (07/06 1045)  SpO2:  [81 %-100 %] 95 % (07/06 0743)  Arterial Line BP: ()/(52-82) 84/70 (07/06 1045)        Laboratory    Lab Results   Component Value Date    WBC 21.55 (H) 07/06/2022    HGB 7.2 (L) 07/06/2022    HCT 22.6 (L) 07/06/2022    MCV 88 07/06/2022     07/06/2022       Lab Results   Component Value Date     (L) 07/06/2022    K 4.2 07/06/2022    CL 86 (L) 07/06/2022    CO2 28 07/06/2022    BUN 40 (H) 07/06/2022    CREATININE 1.6 (H) 07/06/2022    CALCIUM 9.0 07/06/2022       Lab Results   Component Value Date    ALBUMIN 3.2 (L) 07/06/2022     (H) 07/06/2022     (H) 07/06/2022    ALKPHOS 147 (H) 07/06/2022    BILITOT 1.9 (H) 07/06/2022       Lab Results   Component Value Date    INR 1.8 (H) 07/06/2022    INR 1.4 (H) 07/05/2022    INR 1.2 07/04/2022       Assessment  This is a 37-year-old male with a PMH significant for non-ischemic cardiomyopathy with combined CHF (on home Milrinone and status post ICD placement), CKD III, and tobacco abuse who was admitted to [facility] on 06/13/2022 for management of cardiogenic shock.He underwent LVAD, RVAD and MVr on 6/29/22. He is admitted to the ICU post-operatively. post-op course notable for onset of sepsis and transaminitis in hepatocellular pattern. He currently does not meet criteria for acute liver failure given absence of encephalopathy and INR <1.4. Work-up for underlying etiology is without evidence of Jose's disease, biliary  or hepatic vasculature abnormalities. Etiology of liver enzyme abnormality is likely secondary to severe cardiomyopathy and critical illness.  AFP and A1 Antitrypsin AntiSmAb, AMA negative         Recommendations   - Follow-up hepatitis panel and auto-immune markers.   - Minimize use of medications that may precipitate encephalopathy (e.g. opiates and benzo's).   - Maintain normal hemodynamics.   - Obtain CMP and INR daily.   - Inpatient Hepatology to sign off     Thank you for involving us in the care of Kevan Queen. Please call with any additional concerns or questions.    Hollie Spaulding  Gastroenterology Fellow

## 2022-07-06 NOTE — PT/OT/SLP PROGRESS
Speech Language Pathology Treatment & Discharge Summary      Patient Name:  Kevan Queen   MRN:  [id number]  Admitting Diagnosis: LVAD (left ventricular assist device) present    Recommendations:                 General Recommendations:  Follow-up not indicated  Diet recommendations:  Regular, Liquid Diet Level: Thin   Aspiration Precautions: Feed only when awake/alert, HOB to 90 degrees, Small bites/sips and Standard aspiration precautions   General Precautions: Standard, fall, LVAD, sternal  Communication strategies:  none    Subjective     Patient awake and alert  Communicated with nurse prior to session    Pain/Comfort:  Pain Rating 1: 0/10  Pain Rating Post-Intervention 1: 0/10    Respiratory Status: Room air    Objective:     Has the patient been evaluated by SLP for swallowing?   Yes  Keep patient NPO? No     Patient sitting up in bed with SO present. He reported no difficulties with current diet since upgrade to regular. His vocal quality and intensity had both returned to baseline since extubation. SLP educated patient and SO regarding risks/warning signs of aspiration and they verbalized understanding. Patient left in bed with call light within reach.     Assessment:     Kevan Queen is a 37 y.o. male who presents with a safe oropharyngeal swallow at this time.      Goals:   Multidisciplinary Problems     SLP Goals        Problem: SLP    Goal Priority Disciplines Outcome   SLP Goal     SLP Ongoing, Progressing   Description: Speech-Language Pathology Goals  Goals to be met by 7/8/22  1. Patient will participate in ongoing swallow assessment in order to determine safest least restrictive diet.                    Plan:     · Patient to be seen:  4 x/week   · Plan of Care expires:  07/31/22  · Plan of Care reviewed with:  patient, significant other   · SLP Follow-Up: Yes    Discharge recommendations:  rehabilitation facility       Time Tracking:     SLP Treatment Date:   07/06/22  Speech Start Time:   0942  Speech Stop Time:  0950     Speech Total Time (min):  8 min    Billable Minutes: Self Care/Home Management Training 8    Prasad Mejía CCC-SLP  Speech-Language Pathology  Pager: [phone]   07/06/2022

## 2022-07-06 NOTE — ASSESSMENT & PLAN NOTE
Kevan Queen is a 37yoM with a history of polysubstance abuse who presented to the hospital with decompensated heart failure. He underwent LVAD, RVAD and MVr on 6/29/22. He is admitted to the ICU post-operatively.       Neuro/Psych:   -- Sedation: none  -- Pain: Added scheduled tizanidine, lidocaine patch; PRN Tramadol and dilaudid for breakthough  with improvement in pain control yesterday           Cards:   -- s/p LVAD, RVAD insertion and MVr. RVAD removed 7/1  -- HDS on epi 0.06, dobutamine off; Maintain epi  -- s/p chest closure 6/30   -- MAP goal 75-85  -- Off cardene, PRN hydral available, continue norvasc      Pulm:   -- Goal O2 sat > 90%  -- ABG PRN  -- Extubated 7/1  -- iNO2 @ 15ppm; wean as tolerated starting today with goal 5 ppm tomorrow morning  -- daily CXR      Renal:  -- Trend BUN/Cr   -- lasix gtt at 20  -- Goal net negative under 1L       FEN / GI:   -- Replace lytes as needed  -- Nutrition: cardiac  -- GI ppx: famotidine  -- Bowel reg: miralax  -- Hepatology consult to acute liver failure, appreciate recs. Liver enzymes improving.      ID:   -- Tm:afebrile; WBC stable  -- Currently on dapto  -- Blood Cx from 7/2, remain negative      Heme/Onc:   -- H/H stable   -- Daily CBC  -- no intra-op product administered  -- 2u autologous  -- 1u pRBC, 1u FFP, 500mL albumin overnight (6/29)  -- Heparin gtt, decreased to 1400; Warfarin 6 mg QD  -- PTT goal of 45-54      Endo:   -- BG goal 140-180  -- Insulin gtt  -- endocrine consulted      PPx:   Feeding: cardiac  Analgesia/Sedation:none / PRN oxy,Fentanyl  Thromboembolic prevention: SCDs, heparin gtt, warfarin   HOB >30: Yes  Stress Ulcer ppx: famotidine  Glucose control: Critical care goal 140-180 g/dl, ISS     Lines/Drains/Airway: CVC RIJ - will re-consult PICC team today, [place], L brachial A-line; LVAD      Dispo/Code Status/Palliative:   -- SICU / Full Code.

## 2022-07-06 NOTE — SUBJECTIVE & OBJECTIVE
**Interval History: No acute events overnight. PICC placed to right brachial vein.  Last bowel movement was 7/5.  He is still volume overloaded despite increasing Lasix drip to 20mg/hr.  Would increase Lasix again vs adding Metolazone  and would increase  to 5 today.     Continuous Infusions:   sodium chloride 0.9% 5 mL/hr at 06/27/22 0055    sodium chloride 0.9% Stopped (07/05/22 1314)    EPINEPHrine 0.06 mcg/kg/min (07/06/22 1127)    furosemide (LASIX) 2 mg/mL continuous infusion (non-titrating) 20 mg/hr (07/06/22 1100)    heparin (porcine) in 5 % dex 1,400 Units/hr (07/06/22 1100)    insulin regular 1 units/mL infusion orderable (TRANSFER) 1.6 Units/hr (07/06/22 0809)    nicardipine Stopped (07/05/22 2204)    nitric oxide gas       Scheduled Meds:   albumin human 5%  25 g Intravenous Once    amLODIPine  10 mg Oral Daily    DAPTOmycin (CUBICIN)  IV  8 mg/kg Intravenous Q24H    ferrous gluconate  324 mg Oral Daily with breakfast    insulin aspart U-100  12 Units Subcutaneous TIDWM    INV aspirin/placebo  100 mg Oral Daily    LIDOcaine  1 patch Transdermal Q24H    polyethylene glycol  17 g Per NG tube Daily    potassium bicarbonate  50 mEq Oral BID    sodium chloride 0.9%  10 mL Intravenous Q6H    sodium chloride  1 g Oral BID    tiZANidine  2 mg Oral Q8H    warfarin  6 mg Oral Daily     PRN Meds:albumin human 5%, albuterol sulfate, bisacodyL, dextrose 10%, dextrose 10%, glucagon (human recombinant), glucose, glucose, hydrALAZINE, HYDROmorphone, insulin aspart U-100, magnesium hydroxide 400 mg/5 ml, magnesium sulfate IVPB, potassium chloride, potassium chloride in water, sodium chloride 0.9%, sodium chloride 0.9%, Flushing PICC Protocol **AND** sodium chloride 0.9% **AND** sodium chloride 0.9%, traMADoL    Review of patient's allergies indicates:   Allergen Reactions    Aspirin Other (See Comments)     Mr. Thacker is enrolled in Dr. Paige's Alon Trial and cannot have any aspirin and/or aspirin-containing  products. DO NOT cancel any orders for INV Aspirin 100 mg/Placebo. If you have questions, please contact Isabel @ [phone], [phone],[email or web address]), secure chat or MS Teams message.    Bumex [bumetanide] Hives    Lactose Diarrhea     Other reaction(s): Abdominal distension, gaseous    Torsemide Hives     Objective:     Vital Signs (Most Recent):  Temp: 98.2 °F (36.8 °C) (07/06/22 0500)  Pulse: 99 (07/06/22 1115)  Resp: 20 (07/06/22 1115)  BP: (!) 78/0 (07/06/22 0715)  SpO2: 95 % (07/06/22 0743)   Vital Signs (24h Range):  Temp:  [97.9 °F (36.6 °C)-98.9 °F (37.2 °C)] 98.2 °F (36.8 °C)  Pulse:  [] 99  Resp:  [8-37] 20  SpO2:  [81 %-100 %] 95 %  BP: (78-98)/(0-73) 78/0  Arterial Line BP: ()/(52-82) 85/72     Patient Vitals for the past 72 hrs (Last 3 readings):   Weight   07/05/22 1358 93.7 kg (206 lb 9.1 oz)       Body mass index is 25.82 kg/m².      Intake/Output Summary (Last 24 hours) at 7/6/2022 1130  Last data filed at 7/6/2022 1100  Gross per 24 hour   Intake 2537.83 ml   Output 3450 ml   Net -912.17 ml         Hemodynamic Parameters:       Telemetry: ST    Physical Exam  Constitutional:       Comments: Sitting in chair NAD   HENT:      Head: Normocephalic and atraumatic.   Eyes:      Conjunctiva/sclera: Conjunctivae normal.      Pupils: Pupils are equal, round, and reactive to light.   Neck:      Comments: LIJ TLC,   Cardiovascular:      Rate and Rhythm: Regular rhythm. Tachycardia present.      Comments: Smooth VAD hum  Pulmonary:      Breath sounds: Normal breath sounds.   Abdominal:      Palpations: Abdomen is soft.      Comments: Hypoactive bowel sounds   Musculoskeletal:         General: No swelling.      Cervical back: Neck supple.   Skin:     General: Skin is dry.      Capillary Refill: Capillary refill takes 2 to 3 seconds.      Comments: Extremities cool to touch with fans       Significant Labs:  CBC:  Recent Labs   Lab 07/04/22  0301 07/05/22  0310 07/06/22  0402   WBC 24.46*  22.05* 21.55*   RBC 2.79* 2.72* 2.56*   HGB 7.9* 7.8* 7.2*   HCT 24.4* 23.8* 22.6*    216 241   MCV 88 88 88   MCH 28.3 28.7 28.1   MCHC 32.4 32.8 31.9*       BNP:  Recent Labs   Lab 07/01/22  0411 07/04/22 0301 07/06/22  0402   * 776* 955*       CMP:  Recent Labs   Lab 07/05/22 0310 07/05/22 0750 07/05/22 1450 07/05/22 2055 07/06/22 0402 07/06/22  1000   * 206*  --   --  142*  --    CALCIUM 9.2 8.9  --   --  9.0  --    ALBUMIN 3.1* 3.1*  --   --  3.2*  --    PROT 6.9 6.8  --   --  6.9  --    * 131*  --   --  128*  --    K 3.8 3.9   < > 3.6 4.9  4.8 4.2   CO2 30* 27  --   --  28  --    CL 87* 88*  --   --  86*  --    BUN 42* 41*  --   --  40*  --    CREATININE 1.5* 1.4  --   --  1.6*  --    ALKPHOS 122 119  --   --  147*  --    * 392*  --   --  306*  --    * 114*  --   --  104*  --    BILITOT 1.8* 1.8*  --   --  1.9*  --     < > = values in this interval not displayed.        Coagulation:   Recent Labs   Lab 07/04/22 0301 07/04/22  0643 07/05/22 0310 07/05/22  0609 07/05/22 1450 07/05/22 2055 07/06/22  0402   INR 1.2  --  1.4*  --   --   --  1.8*   APTT  --    < >  --    < > 46.7* 59.2* 54.3*    < > = values in this interval not displayed.       LDH:  Recent Labs   Lab 07/04/22 0301 07/05/22 0310 07/06/22  0402   * 686* 597*       Microbiology:  Microbiology Results (last 7 days)       Procedure Component Value Units Date/Time    Blood culture [[id number]] Collected: 06/30/22 1836    Order Status: Completed Specimen: Blood from Peripheral, Wrist, Left Updated: 07/05/22 2012     Blood Culture, Routine No growth after 5 days.    Blood culture [[id number]] Collected: 06/30/22 1833    Order Status: Completed Specimen: Blood from Peripheral, Forearm, Right Updated: 07/05/22 2012     Blood Culture, Routine No growth after 5 days.    Blood culture [[id number]] Collected: 07/02/22 1618    Order Status: Completed Specimen: Blood from Peripheral, Antecubital, Right  Updated: 07/05/22 1812     Blood Culture, Routine No Growth to date      No Growth to date      No Growth to date      No Growth to date    Blood culture [[id number]] Collected: 07/02/22 1629    Order Status: Completed Specimen: Blood from Peripheral, Hand, Right Updated: 07/05/22 1812     Blood Culture, Routine No Growth to date      No Growth to date      No Growth to date      No Growth to date    Blood culture [[id number]]  (Abnormal)  (Susceptibility) Collected: 06/23/22 0108    Order Status: Completed Specimen: Blood from Peripheral, Hand, Right Updated: 07/01/22 1137     Blood Culture, Routine Gram stain dudley bottle: Gram positive cocci in clusters resembling Staph       Results called to and read back by: Mark Pickett RN  06/24/2022        14:36      STAPHYLOCOCCUS EPIDERMIDIS    Blood culture [[id number]] Collected: 06/25/22 0634    Order Status: Completed Specimen: Blood from Peripheral, Wrist, Left Updated: 06/30/22 0812     Blood Culture, Routine No growth after 5 days.    Blood culture [[id number]] Collected: 06/25/22 0633    Order Status: Completed Specimen: Blood from Peripheral, Antecubital, Right Updated: 06/30/22 0812     Blood Culture, Routine No growth after 5 days.            I have reviewed all pertinent labs within the past 24 hours.    Estimated Creatinine Clearance: 75.6 mL/min (A) (based on SCr of 1.6 mg/dL (H)).    Diagnostic Results:  I have reviewed and interpreted all pertinent imaging results/findings within the past 24 hours.

## 2022-07-06 NOTE — PLAN OF CARE
SICU PLAN OF CARE NOTE    Dx: LVAD (left ventricular assist device) present    Shift Events: Epi increased to 0.06,  weaned to off. LVAD speed increased to 5300, LSL 4900. Marcella weaned to 10 ppm. Goal for pt to be net negative 1L in 24h.    Gtts:     Insulin 1.6  Heparin 1400  Epi 0.06  Lasix 20    Neuro: AAO x4    Cardiac: NSR, HR 90-100s    HM3  Flows: 4.6-4.8  PI: 3-3.8  Power: 3.7-3.9    Respiratory: 5 L NC with 10 ppm Marcella    GI: Cardiac Diet/1500 mL FR    : Voids Spontaneously 3000 cc/shift     Labs/Accuchecks: Q6h Mg, K, aPTT. Accuchecks ACHS.    Skin: No skin breakdown noted. Midsternal incision with prevena CDI. Daily LVAD dressing change completed by RN. Bed plugged in and appropriately inflated. Heel and sacral foams in place. Weight shift assistance provided throughout shift.

## 2022-07-06 NOTE — ASSESSMENT & PLAN NOTE
-S/P DT HM3 with MVR plus Protek Duo for RV support 6/29 (Grecia)  -Rvad removed with concern for hemolysis, Marcella increased to 20,  -Post op antibiotic per CTS (h/o Staph epi bacteremia, cleareed by ID for surgery with rec to continue antibiotic coverage for 2 weeks post op)  - GASTON trial   -Speed set at 5200   -Echo done 7/5 EF: 10%, LVEDD: 7.13,   -Would increase lasix drip (+ adding metolazone) and increase  to 5  -INR is 1.8 today (goal 2-3)     Procedure: Device Interrogation Including analysis of device parameters  Current Settings: Ventricular Assist Device  Review of device function is stable    TXP LVAD INTERROGATIONS 7/6/2022 7/6/2022 7/6/2022 7/6/2022 7/6/2022 7/6/2022 7/6/2022   Type HeartMate3 HeartMate3 HeartMate3 HeartMate3 HeartMate3 HeartMate3 HeartMate3   Flow 4.8 4.6 4.7 4.6 4.6 4.4 4.5   Speed 5300 5300 5200 5200 5200 5200 5200   PI 3.3 3.2 2.5 3.4 2.9 3.8 3.4   Power (Serna) 3.8 3.8 3.8 3.7 3.6 3.7 3.6   LSL - 4900 - - 4800 - -   Pulsatility - - - - - - -

## 2022-07-06 NOTE — PHYSICIAN QUERY
"PT Name: Kevan Queen  MR #: [id number]  DOCUMENTATION CLARIFICATION      CDS/: Jade Block RN, CCDS             Contact information: [email or web address]    This form is a permanent document in the medical record.      Query Date: July 6, 2022    By submitting this query, we are merely seeking further clarification of documentation. Please utilize your independent clinical judgment when addressing the question(s) below.    The Medical Record contains the following:   Indicators  Supporting Clinical Findings Location in Medical Record   X PT        INR        PTT PT-28.9  INR-3.0  PTT- >150 6/29 lab   X Platelets 306- 247 6/29- 6/30 lab   X Coagulopathy or Coagulation Defect documented severe diffuse coagulopathy requiring transfusion of blood products and severe vasoplegia requiring initiation of Giapreza    Diffuse coagulopathy. 6/29 op note          6/30 op note     X Acute/Chronic Illness Acute on chronic systolic and diastolic heart failure, Cardiogenic shock  Transaminitis, Severe mitral regurgitation    6/29 op note   X Treatment Received 1u pRBC, 1u FFP, and 500cc albumin overnight 6/30 prog note    Other       Provider, please specify the meaning of  "Diffuse Coagulopathy"  [   ] Abnormal INR/Coagulation Profile   [   ] Coagulation Defect due to (please specify):_________   [ X  ] Coagulopathy Secondary to Anticoagulation Therapy   [   ] Coagulation Factor Deficiency due to Liver Disease   [   ] Coagulation deficiency unspecified   [   ] Other hematological diagnosis (please specify):_______   [  ] Clinically Undetermined         Please document in your progress notes daily for the duration of treatment until resolved, and include in your discharge summary.  "

## 2022-07-06 NOTE — PT/OT/SLP PROGRESS
Physical Therapy      Patient Name:  Kevan Queen   MRN:  [id number]    Patient not seen today secondary to  (pt not seen by PT due to being on bedrest per MD for low BP. OT will treat pt with ther exer in bed.). Will follow-up at a later date.  7/6/2022  .

## 2022-07-06 NOTE — PROGRESS NOTES
[facility] - Surgical Intensive Care  Critical Care - Surgery  Progress Note    Patient Name: Kevan Queen  MRN: [id number]  Admission Date: 6/13/2022  Hospital Length of Stay: 23 days  Code Status: Full Code  Attending Provider: Luis F Paige MD  Primary Care Provider: ORALIA Cline   Principal Problem: LVAD (left ventricular assist device) present    Subjective:     Hospital/ICU Course:  No notes on file    Interval History/Significant Events: No acute events overnight, however this morning had an episode of hypotension when being moved to the chair with MAPs down to the 40s. Patient returned to bed with MAPs sustaining in the 60s and a decrease in LVAD flows. Epi was increased and a bolus of 250 albumin given with return of MAPs to <75.     Follow-up For: Procedure(s) (LRB):  CLOSURE, WOUND, STERNUM (N/A)  INSERTION-RIGHT VENTRICULAR ASSIST DEVICE (Right)  APPLICATION, WOUND VAC (N/A)  IRRIGATION, MEDIASTINUM    Post-Operative Day: 6 Days Post-Op    Objective:     Vital Signs (Most Recent):  Temp: 98.2 °F (36.8 °C) (07/06/22 0500)  Pulse: 97 (07/06/22 0915)  Resp: 14 (07/06/22 0915)  BP: (!) 78/0 (07/06/22 0715)  SpO2: 95 % (07/06/22 0743)   Vital Signs (24h Range):  Temp:  [97.9 °F (36.6 °C)-100.4 °F (38 °C)] 98.2 °F (36.8 °C)  Pulse:  [] 97  Resp:  [8-36] 14  SpO2:  [81 %-100 %] 95 %  BP: (78-98)/(0-73) 78/0  Arterial Line BP: ()/(52-82) 88/75     Weight: 93.7 kg (206 lb 9.1 oz)  Body mass index is 25.82 kg/m².      Intake/Output Summary (Last 24 hours) at 7/6/2022 0937  Last data filed at 7/6/2022 0900  Gross per 24 hour   Intake 2709.75 ml   Output 3225 ml   Net -515.25 ml       Physical Exam  Vitals reviewed.   Constitutional:       General: He is not in acute distress.  HENT:      Head: Normocephalic and atraumatic.      Nose: Nose normal.      Mouth/Throat:      Mouth: Mucous membranes are moist.   Eyes:      Pupils: Pupils are equal, round, and reactive to light.   Neck:      Comments:      Cardiovascular:      Rate and Rhythm: Normal rate and regular rhythm.      Pulses: Normal pulses.      Comments: S/p chest closure. Incision c/d/I.        Pulmonary:      Effort: Pulmonary effort is normal. No respiratory distress.      Comments: On 4L NC  Abdominal:      General: Abdomen is flat. There is no distension.      Palpations: Abdomen is soft.   Musculoskeletal:      Comments: IABP site at left fem with dressing in place   Skin:     General: Skin is warm and dry.      Capillary Refill: Capillary refill takes less than 2 seconds.   Neurological:      General: No focal deficit present.      Mental Status: He is alert and oriented to person, place, and time.       Vents:  Vent Mode: Spont (07/01/22 1205)  Ventilator Initiated: Yes (06/29/22 1527)  Set Rate: 18 BPM (07/01/22 0737)  Vt Set: 580 mL (07/01/22 0737)  Pressure Support: 8 cmH20 (07/01/22 1205)  PEEP/CPAP: 5 cmH20 (07/01/22 1205)  Oxygen Concentration (%): 36 (07/05/22 0328)  Peak Airway Pressure: 15 cmH2O (07/01/22 1205)  Plateau Pressure: 21 cmH20 (07/01/22 1205)  Total Ve: 17 mL (07/01/22 1205)  Negative Inspiratory Force (cm H2O): -13 (07/01/22 1205)  F/VT Ratio<105 (RSBI): (!) 70.48 (07/01/22 1205)    Lines/Drains/Airways       Peripherally Inserted Central Catheter Line  Duration             PICC Triple Lumen 07/05/22 1554 right brachial <1 day              Central Venous Catheter Line  Duration              Introducer with Double Lumen 06/29/22 1123 6 days    Percutaneous Central Line Insertion/Assessment - Triple Lumen  06/29/22 1200 left internal jugular 6 days              Arterial Line  Duration             Arterial Line 06/29/22 0832 Left Brachial 7 days              Line  Duration                  VAD 06/29/22 1115 Left ventricular assist device HeartMate 3 6 days              Peripheral Intravenous Line  Duration                  Peripheral IV - Single Lumen 07/02/22 2226 20 G Anterior;Distal;Left Forearm 3 days                     Significant Labs:    CBC/Anemia Profile:  Recent Labs   Lab 07/05/22  0310 07/06/22  0402   WBC 22.05* 21.55*   HGB 7.8* 7.2*   HCT 23.8* 22.6*    241   MCV 88 88   RDW 15.4* 16.1*        Chemistries:  Recent Labs   Lab 07/05/22  0310 07/05/22  0750 07/05/22  1450 07/05/22  2055 07/06/22  0402   * 131*  --   --  128*   K 3.8 3.9 3.9 3.6 4.9  4.8   CL 87* 88*  --   --  86*   CO2 30* 27  --   --  28   BUN 42* 41*  --   --  40*   CREATININE 1.5* 1.4  --   --  1.6*   CALCIUM 9.2 8.9  --   --  9.0   ALBUMIN 3.1* 3.1*  --   --  3.2*   PROT 6.9 6.8  --   --  6.9   BILITOT 1.8* 1.8*  --   --  1.9*   ALKPHOS 122 119  --   --  147*   * 392*  --   --  306*   * 114*  --   --  104*   MG 2.4  --  2.7* 2.4 2.5   PHOS 3.1  --   --   --  3.9       ABGs:   Recent Labs   Lab 07/05/22  2130   PH 7.462*   PCO2 37.9   HCO3 27.1   POCSATURATED 98   BE 3     Coagulation:   Recent Labs   Lab 07/06/22  0402   INR 1.8*   APTT 54.3*     All pertinent labs within the past 24 hours have been reviewed.    Significant Imaging:  I have reviewed all pertinent imaging results/findings within the past 24 hours.    Assessment/Plan:     Acute on chronic combined systolic and diastolic congestive heart failure, NYHA class 4  Kevan Queen is a 37yoM with a history of polysubstance abuse who presented to the hospital with decompensated heart failure. He underwent LVAD, RVAD and MVr on 6/29/22. He is admitted to the ICU post-operatively.       Neuro/Psych:   -- Sedation: none  -- Pain: Added scheduled tizanidine, lidocaine patch; PRN Tramadol and dilaudid for breakthough  with improvement in pain control yesterday           Cards:   -- s/p LVAD, RVAD insertion and MVr. RVAD removed 7/1  -- HDS on epi 0.06, dobutamine off; Maintain epi  -- s/p chest closure 6/30   -- MAP goal 75-85  -- Off cardene, PRN hydral available, continue norvasc      Pulm:   -- Goal O2 sat > 90%  -- ABG PRN  -- Extubated 7/1  -- iNO2 @ 15ppm;  wean as tolerated starting today with goal 5 ppm tomorrow morning  -- daily CXR      Renal:  -- Trend BUN/Cr   -- lasix gtt at 20  -- Goal net negative under 1L       FEN / GI:   -- Replace lytes as needed  -- Nutrition: cardiac  -- GI ppx: famotidine  -- Bowel reg: miralax  -- Hepatology consult to acute liver failure, appreciate recs. Liver enzymes improving.      ID:   -- Tm:afebrile; WBC stable  -- Currently on dapto  -- Blood Cx from 7/2, remain negative      Heme/Onc:   -- H/H stable   -- Daily CBC  -- no intra-op product administered  -- 2u autologous  -- 1u pRBC, 1u FFP, 500mL albumin overnight (6/29)  -- Heparin gtt, decreased to 1400; Warfarin 6 mg QD  -- PTT goal of 45-54      Endo:   -- BG goal 140-180  -- Insulin gtt  -- endocrine consulted      PPx:   Feeding: cardiac  Analgesia/Sedation:none / PRN oxy,Fentanyl  Thromboembolic prevention: SCDs, heparin gtt, warfarin   HOB >30: Yes  Stress Ulcer ppx: famotidine  Glucose control: Critical care goal 140-180 g/dl, ISS     Lines/Drains/Airway: CVC RIJ - will re-consult PICC team today, [place], L brachial A-line; LVAD      Dispo/Code Status/Palliative:   -- SICU / Full Code.          Rebeca Horvath MD  Critical Care - Surgery  [facility]

## 2022-07-07 PROBLEM — E87.1 HYPONATREMIA: Status: ACTIVE | Noted: 2022-07-07

## 2022-07-07 LAB
ALBUMIN SERPL BCP-MCNC: 3.2 G/DL (ref 3.5–5.2)
ALLENS TEST: ABNORMAL
ALP SERPL-CCNC: 146 U/L (ref 55–135)
ALT SERPL W/O P-5'-P-CCNC: 204 U/L (ref 10–44)
ANION GAP SERPL CALC-SCNC: 12 MMOL/L (ref 8–16)
APTT BLDCRRT: 48.2 SEC (ref 21–32)
APTT BLDCRRT: 51 SEC (ref 21–32)
AST SERPL-CCNC: 71 U/L (ref 10–40)
BACTERIA BLD CULT: NORMAL
BACTERIA BLD CULT: NORMAL
BASOPHILS # BLD AUTO: 0.03 K/UL (ref 0–0.2)
BASOPHILS NFR BLD: 0.1 % (ref 0–1.9)
BILIRUB SERPL-MCNC: 1.5 MG/DL (ref 0.1–1)
BUN SERPL-MCNC: 34 MG/DL (ref 6–20)
CALCIUM SERPL-MCNC: 8.7 MG/DL (ref 8.7–10.5)
CHLORIDE SERPL-SCNC: 88 MMOL/L (ref 95–110)
CO2 SERPL-SCNC: 28 MMOL/L (ref 23–29)
CREAT SERPL-MCNC: 1.3 MG/DL (ref 0.5–1.4)
DIFFERENTIAL METHOD: ABNORMAL
EOSINOPHIL # BLD AUTO: 0.1 K/UL (ref 0–0.5)
EOSINOPHIL NFR BLD: 0.6 % (ref 0–8)
ERYTHROCYTE [DISTWIDTH] IN BLOOD BY AUTOMATED COUNT: 16 % (ref 11.5–14.5)
EST. GFR  (AFRICAN AMERICAN): >60 ML/MIN/1.73 M^2
EST. GFR  (NON AFRICAN AMERICAN): >60 ML/MIN/1.73 M^2
FIBRINOGEN PPP-MCNC: 691 MG/DL (ref 182–400)
GLUCOSE SERPL-MCNC: 139 MG/DL (ref 70–110)
HCO3 UR-SCNC: 30.3 MMOL/L (ref 24–28)
HCO3 UR-SCNC: 34 MMOL/L (ref 24–28)
HCO3 UR-SCNC: 35.2 MMOL/L (ref 24–28)
HCT VFR BLD AUTO: 22.8 % (ref 40–54)
HGB BLD-MCNC: 7.1 G/DL (ref 14–18)
IMM GRANULOCYTES # BLD AUTO: 0.15 K/UL (ref 0–0.04)
IMM GRANULOCYTES NFR BLD AUTO: 0.7 % (ref 0–0.5)
INR PPP: 2 (ref 0.8–1.2)
LACTATE SERPL-SCNC: 1.8 MMOL/L (ref 0.5–2.2)
LDH SERPL L TO P-CCNC: 544 U/L (ref 110–260)
LYMPHOCYTES # BLD AUTO: 2.3 K/UL (ref 1–4.8)
LYMPHOCYTES NFR BLD: 11 % (ref 18–48)
MAGNESIUM SERPL-MCNC: 2.3 MG/DL (ref 1.6–2.6)
MAGNESIUM SERPL-MCNC: 2.5 MG/DL (ref 1.6–2.6)
MCH RBC QN AUTO: 27.5 PG (ref 27–31)
MCHC RBC AUTO-ENTMCNC: 31.1 G/DL (ref 32–36)
MCV RBC AUTO: 88 FL (ref 82–98)
METHEMOGLOBIN: 1.2 % (ref 0–3)
MONOCYTES # BLD AUTO: 1.7 K/UL (ref 0.3–1)
MONOCYTES NFR BLD: 8.2 % (ref 4–15)
NEUTROPHILS # BLD AUTO: 16.7 K/UL (ref 1.8–7.7)
NEUTROPHILS NFR BLD: 79.4 % (ref 38–73)
NRBC BLD-RTO: 1 /100 WBC
PCO2 BLDA: 40.6 MMHG (ref 35–45)
PCO2 BLDA: 55.5 MMHG (ref 35–45)
PCO2 BLDA: 56 MMHG (ref 35–45)
PH SMN: 7.39 [PH] (ref 7.35–7.45)
PH SMN: 7.41 [PH] (ref 7.35–7.45)
PH SMN: 7.48 [PH] (ref 7.35–7.45)
PHOSPHATE SERPL-MCNC: 3.3 MG/DL (ref 2.7–4.5)
PLATELET # BLD AUTO: 302 K/UL (ref 150–450)
PMV BLD AUTO: 10.6 FL (ref 9.2–12.9)
PO2 BLDA: 145 MMHG (ref 80–100)
PO2 BLDA: 19 MMHG (ref 40–60)
PO2 BLDA: 19 MMHG (ref 40–60)
POC BE: 11 MMOL/L
POC BE: 7 MMOL/L
POC BE: 9 MMOL/L
POC SATURATED O2: 27 % (ref 95–100)
POC SATURATED O2: 29 % (ref 95–100)
POC SATURATED O2: 99 % (ref 95–100)
POC TCO2: 32 MMOL/L (ref 23–27)
POC TCO2: 36 MMOL/L (ref 24–29)
POC TCO2: 37 MMOL/L (ref 24–29)
POCT GLUCOSE: 104 MG/DL (ref 70–110)
POCT GLUCOSE: 128 MG/DL (ref 70–110)
POCT GLUCOSE: 154 MG/DL (ref 70–110)
POCT GLUCOSE: 162 MG/DL (ref 70–110)
POCT GLUCOSE: 189 MG/DL (ref 70–110)
POTASSIUM SERPL-SCNC: 3.6 MMOL/L (ref 3.5–5.1)
POTASSIUM SERPL-SCNC: 4.3 MMOL/L (ref 3.5–5.1)
POTASSIUM SERPL-SCNC: 4.6 MMOL/L (ref 3.5–5.1)
PREALB SERPL-MCNC: 12 MG/DL (ref 20–43)
PROT SERPL-MCNC: 6.8 G/DL (ref 6–8.4)
PROTHROMBIN TIME: 20.2 SEC (ref 9–12.5)
RBC # BLD AUTO: 2.58 M/UL (ref 4.6–6.2)
SAMPLE: ABNORMAL
SITE: ABNORMAL
SODIUM SERPL-SCNC: 128 MMOL/L (ref 136–145)
WBC # BLD AUTO: 21.04 K/UL (ref 3.9–12.7)

## 2022-07-07 PROCEDURE — 85730 THROMBOPLASTIN TIME PARTIAL: CPT

## 2022-07-07 PROCEDURE — 37799 UNLISTED PX VASCULAR SURGERY: CPT

## 2022-07-07 PROCEDURE — 20000000 HC ICU ROOM

## 2022-07-07 PROCEDURE — 97530 THERAPEUTIC ACTIVITIES: CPT

## 2022-07-07 PROCEDURE — 85384 FIBRINOGEN ACTIVITY: CPT | Performed by: STUDENT IN AN ORGANIZED HEALTH CARE EDUCATION/TRAINING PROGRAM

## 2022-07-07 PROCEDURE — 99292 CRITICAL CARE ADDL 30 MIN: CPT | Mod: ,,, | Performed by: INTERNAL MEDICINE

## 2022-07-07 PROCEDURE — 99900035 HC TECH TIME PER 15 MIN (STAT)

## 2022-07-07 PROCEDURE — 27000248 HC VAD-ADDITIONAL DAY

## 2022-07-07 PROCEDURE — 63600175 PHARM REV CODE 636 W HCPCS

## 2022-07-07 PROCEDURE — 25000003 PHARM REV CODE 250

## 2022-07-07 PROCEDURE — 85730 THROMBOPLASTIN TIME PARTIAL: CPT | Mod: 91 | Performed by: STUDENT IN AN ORGANIZED HEALTH CARE EDUCATION/TRAINING PROGRAM

## 2022-07-07 PROCEDURE — 63600367 HC NITRIC OXIDE PER HOUR

## 2022-07-07 PROCEDURE — 84134 ASSAY OF PREALBUMIN: CPT | Performed by: STUDENT IN AN ORGANIZED HEALTH CARE EDUCATION/TRAINING PROGRAM

## 2022-07-07 PROCEDURE — 63600175 PHARM REV CODE 636 W HCPCS: Performed by: INTERNAL MEDICINE

## 2022-07-07 PROCEDURE — 94799 UNLISTED PULMONARY SVC/PX: CPT

## 2022-07-07 PROCEDURE — 25000003 PHARM REV CODE 250: Performed by: THORACIC SURGERY (CARDIOTHORACIC VASCULAR SURGERY)

## 2022-07-07 PROCEDURE — 99291 PR CRITICAL CARE, E/M 30-74 MINUTES: ICD-10-PCS | Mod: 25,,, | Performed by: INTERNAL MEDICINE

## 2022-07-07 PROCEDURE — 83050 HGB METHEMOGLOBIN QUAN: CPT

## 2022-07-07 PROCEDURE — 94761 N-INVAS EAR/PLS OXIMETRY MLT: CPT

## 2022-07-07 PROCEDURE — 84132 ASSAY OF SERUM POTASSIUM: CPT | Mod: 91

## 2022-07-07 PROCEDURE — 63600175 PHARM REV CODE 636 W HCPCS: Performed by: STUDENT IN AN ORGANIZED HEALTH CARE EDUCATION/TRAINING PROGRAM

## 2022-07-07 PROCEDURE — 85025 COMPLETE CBC W/AUTO DIFF WBC: CPT

## 2022-07-07 PROCEDURE — 27200966 HC CLOSED SUCTION SYSTEM

## 2022-07-07 PROCEDURE — 99291 CRITICAL CARE FIRST HOUR: CPT | Mod: ,,, | Performed by: ANESTHESIOLOGY

## 2022-07-07 PROCEDURE — 99900026 HC AIRWAY MAINTENANCE (STAT)

## 2022-07-07 PROCEDURE — 25000003 PHARM REV CODE 250: Performed by: INTERNAL MEDICINE

## 2022-07-07 PROCEDURE — 84132 ASSAY OF SERUM POTASSIUM: CPT

## 2022-07-07 PROCEDURE — 25000003 PHARM REV CODE 250: Performed by: STUDENT IN AN ORGANIZED HEALTH CARE EDUCATION/TRAINING PROGRAM

## 2022-07-07 PROCEDURE — 83735 ASSAY OF MAGNESIUM: CPT | Mod: 91

## 2022-07-07 PROCEDURE — 85610 PROTHROMBIN TIME: CPT

## 2022-07-07 PROCEDURE — 80053 COMPREHEN METABOLIC PANEL: CPT

## 2022-07-07 PROCEDURE — 82803 BLOOD GASES ANY COMBINATION: CPT

## 2022-07-07 PROCEDURE — 83605 ASSAY OF LACTIC ACID: CPT

## 2022-07-07 PROCEDURE — 82800 BLOOD PH: CPT

## 2022-07-07 PROCEDURE — 83735 ASSAY OF MAGNESIUM: CPT

## 2022-07-07 PROCEDURE — 93750 PR INTERROGATE VENT ASSIST DEV, IN PERSON, W PHYSICIAN ANALYSIS: ICD-10-PCS | Mod: ,,, | Performed by: INTERNAL MEDICINE

## 2022-07-07 PROCEDURE — 99291 PR CRITICAL CARE, E/M 30-74 MINUTES: ICD-10-PCS | Mod: ,,, | Performed by: ANESTHESIOLOGY

## 2022-07-07 PROCEDURE — 99232 SBSQ HOSP IP/OBS MODERATE 35: CPT | Mod: ,,, | Performed by: NURSE PRACTITIONER

## 2022-07-07 PROCEDURE — A4216 STERILE WATER/SALINE, 10 ML: HCPCS | Performed by: THORACIC SURGERY (CARDIOTHORACIC VASCULAR SURGERY)

## 2022-07-07 PROCEDURE — 83615 LACTATE (LD) (LDH) ENZYME: CPT | Performed by: THORACIC SURGERY (CARDIOTHORACIC VASCULAR SURGERY)

## 2022-07-07 PROCEDURE — 93750 INTERROGATION VAD IN PERSON: CPT | Mod: ,,, | Performed by: INTERNAL MEDICINE

## 2022-07-07 PROCEDURE — 99232 PR SUBSEQUENT HOSPITAL CARE,LEVL II: ICD-10-PCS | Mod: ,,, | Performed by: NURSE PRACTITIONER

## 2022-07-07 PROCEDURE — 99291 CRITICAL CARE FIRST HOUR: CPT | Mod: 25,,, | Performed by: INTERNAL MEDICINE

## 2022-07-07 PROCEDURE — 97535 SELF CARE MNGMENT TRAINING: CPT

## 2022-07-07 PROCEDURE — 27000221 HC OXYGEN, UP TO 24 HOURS

## 2022-07-07 PROCEDURE — 99292 PR CRITICAL CARE, ADDL 30 MIN: ICD-10-PCS | Mod: ,,, | Performed by: INTERNAL MEDICINE

## 2022-07-07 PROCEDURE — 84100 ASSAY OF PHOSPHORUS: CPT | Performed by: THORACIC SURGERY (CARDIOTHORACIC VASCULAR SURGERY)

## 2022-07-07 RX ORDER — INSULIN ASPART 100 [IU]/ML
14 INJECTION, SOLUTION INTRAVENOUS; SUBCUTANEOUS
Status: DISCONTINUED | OUTPATIENT
Start: 2022-07-07 | End: 2022-07-09

## 2022-07-07 RX ADMIN — FUROSEMIDE 20 MG/HR: 10 INJECTION, SOLUTION INTRAMUSCULAR; INTRAVENOUS at 05:07

## 2022-07-07 RX ADMIN — TRAMADOL HYDROCHLORIDE 50 MG: 50 TABLET, COATED ORAL at 08:07

## 2022-07-07 RX ADMIN — Medication 10 ML: at 05:07

## 2022-07-07 RX ADMIN — Medication 10 ML: at 12:07

## 2022-07-07 RX ADMIN — Medication 324 MG: at 07:07

## 2022-07-07 RX ADMIN — Medication 10 ML: at 11:07

## 2022-07-07 RX ADMIN — SODIUM CHLORIDE 1 G: 1 TABLET ORAL at 01:07

## 2022-07-07 RX ADMIN — HYDROMORPHONE HYDROCHLORIDE 0.2 MG: 1 INJECTION, SOLUTION INTRAMUSCULAR; INTRAVENOUS; SUBCUTANEOUS at 10:07

## 2022-07-07 RX ADMIN — FUROSEMIDE 20 MG/HR: 10 INJECTION, SOLUTION INTRAMUSCULAR; INTRAVENOUS at 03:07

## 2022-07-07 RX ADMIN — TIZANIDINE 2 MG: 2 TABLET ORAL at 01:07

## 2022-07-07 RX ADMIN — POLYETHYLENE GLYCOL 3350 17 G: 17 POWDER, FOR SOLUTION ORAL at 08:07

## 2022-07-07 RX ADMIN — HYDROMORPHONE HYDROCHLORIDE 0.2 MG: 1 INJECTION, SOLUTION INTRAMUSCULAR; INTRAVENOUS; SUBCUTANEOUS at 11:07

## 2022-07-07 RX ADMIN — INSULIN ASPART 12 UNITS: 100 INJECTION, SOLUTION INTRAVENOUS; SUBCUTANEOUS at 08:07

## 2022-07-07 RX ADMIN — TIZANIDINE 2 MG: 2 TABLET ORAL at 09:07

## 2022-07-07 RX ADMIN — HEPARIN SODIUM 1400 UNITS/HR: 5000 INJECTION INTRAVENOUS; SUBCUTANEOUS at 03:07

## 2022-07-07 RX ADMIN — HYDROMORPHONE HYDROCHLORIDE 0.2 MG: 1 INJECTION, SOLUTION INTRAMUSCULAR; INTRAVENOUS; SUBCUTANEOUS at 04:07

## 2022-07-07 RX ADMIN — WARFARIN SODIUM 6 MG: 5 TABLET ORAL at 04:07

## 2022-07-07 RX ADMIN — TIZANIDINE 2 MG: 2 TABLET ORAL at 05:07

## 2022-07-07 RX ADMIN — DAPTOMYCIN 750 MG: 350 INJECTION, POWDER, LYOPHILIZED, FOR SOLUTION INTRAVENOUS at 11:07

## 2022-07-07 RX ADMIN — POTASSIUM BICARBONATE 50 MEQ: 978 TABLET, EFFERVESCENT ORAL at 04:07

## 2022-07-07 RX ADMIN — INSULIN ASPART 2 UNITS: 100 INJECTION, SOLUTION INTRAVENOUS; SUBCUTANEOUS at 08:07

## 2022-07-07 RX ADMIN — SODIUM CHLORIDE 1 G: 1 TABLET ORAL at 08:07

## 2022-07-07 RX ADMIN — POTASSIUM BICARBONATE 50 MEQ: 978 TABLET, EFFERVESCENT ORAL at 08:07

## 2022-07-07 RX ADMIN — TRAMADOL HYDROCHLORIDE 50 MG: 50 TABLET, COATED ORAL at 01:07

## 2022-07-07 RX ADMIN — INSULIN ASPART 14 UNITS: 100 INJECTION, SOLUTION INTRAVENOUS; SUBCUTANEOUS at 11:07

## 2022-07-07 RX ADMIN — TRAMADOL HYDROCHLORIDE 50 MG: 50 TABLET, COATED ORAL at 02:07

## 2022-07-07 RX ADMIN — HYDROMORPHONE HYDROCHLORIDE 0.2 MG: 1 INJECTION, SOLUTION INTRAMUSCULAR; INTRAVENOUS; SUBCUTANEOUS at 05:07

## 2022-07-07 RX ADMIN — EPINEPHRINE 0.05 MCG/KG/MIN: 1 INJECTION INTRAMUSCULAR; INTRAVENOUS; SUBCUTANEOUS at 05:07

## 2022-07-07 RX ADMIN — AMLODIPINE BESYLATE 10 MG: 10 TABLET ORAL at 08:07

## 2022-07-07 NOTE — PROGRESS NOTES
[facility] - Surgical Intensive Care  Heart Transplant  Progress Note    Patient Name: Kevan Queen  MRN: [id number]  Admission Date: 6/13/2022  Hospital Length of Stay: 24 days  Attending Physician: Luis F Paige MD  Primary Care Provider: ORALIA Cline  Principal Problem:LVAD (left ventricular assist device) present    Subjective:     **Interval History: LVAD speed increased to 5300 yesterday and patient transitioned off . He reports feeling well today.  Has some bilateral ankle pain.  He is net negative 2.8L in the last 24 hours.  Remains volume overloaded on exam.  CVP: 18, SVO2: 29, CO: 5.31, CI: 2.34 SVR: 950.  Would avoid salt tablets in management on hyponatremia as he is hypervolemic.  Would consider Primacor for inotropic support if needed to aid with diuresis.     Continuous Infusions:   sodium chloride 0.9% 5 mL/hr at 06/27/22 0055    sodium chloride 0.9% Stopped (07/05/22 1314)    EPINEPHrine 0.05 mcg/kg/min (07/07/22 1000)    furosemide (LASIX) 2 mg/mL continuous infusion (non-titrating) 20 mg/hr (07/07/22 1000)    heparin (porcine) in 5 % dex Stopped (07/07/22 0924)    insulin regular 1 units/mL infusion orderable (TRANSFER) 1.3 Units/hr (07/07/22 0900)    nicardipine Stopped (07/05/22 2204)    nitric oxide gas       Scheduled Meds:   albumin human 5%  25 g Intravenous Once    amLODIPine  10 mg Oral Daily    DAPTOmycin (CUBICIN)  IV  8 mg/kg Intravenous Q24H    ferrous gluconate  324 mg Oral Daily with breakfast    insulin aspart U-100  14 Units Subcutaneous TIDWM    INV aspirin/placebo  100 mg Oral Daily    LIDOcaine  1 patch Transdermal Q24H    polyethylene glycol  17 g Per NG tube Daily    potassium bicarbonate  50 mEq Oral BID    sodium chloride 0.9%  10 mL Intravenous Q6H    tiZANidine  2 mg Oral Q8H    warfarin  6 mg Oral Daily     PRN Meds:albumin human 5%, albuterol sulfate, bisacodyL, dextrose 10%, dextrose 10%, glucagon (human recombinant), glucose, glucose,  hydrALAZINE, HYDROmorphone, insulin aspart U-100, magnesium hydroxide 400 mg/5 ml, magnesium sulfate IVPB, potassium bicarbonate, potassium bicarbonate, potassium bicarbonate, sodium chloride 0.9%, sodium chloride 0.9%, Flushing PICC Protocol **AND** sodium chloride 0.9% **AND** sodium chloride 0.9%, traMADoL    Review of patient's allergies indicates:   Allergen Reactions    Aspirin Other (See Comments)     Mr. Thacker is enrolled in Dr. Paige's Alon Trial and cannot have any aspirin and/or aspirin-containing products. DO NOT cancel any orders for INV Aspirin 100 mg/Placebo. If you have questions, please contact Isabel @ [phone], [phone],[email or web address], secure chat or MS Teams message.    Bumex [bumetanide] Hives    Lactose Diarrhea     Other reaction(s): Abdominal distension, gaseous    Torsemide Hives     Objective:     Vital Signs (Most Recent):  Temp: 98.3 °F (36.8 °C) (07/07/22 0400)  Pulse: 100 (07/07/22 1000)  Resp: (!) 21 (07/07/22 1000)  BP: (!) 74/0 (07/07/22 0715)  SpO2: 96 % (07/07/22 0830)   Vital Signs (24h Range):  Temp:  [98.3 °F (36.8 °C)-98.9 °F (37.2 °C)] 98.3 °F (36.8 °C)  Pulse:  [] 100  Resp:  [8-41] 21  SpO2:  [95 %-98 %] 96 %  BP: (74-82)/(0) 74/0  Arterial Line BP: (76-92)/(61-81) 83/68     Patient Vitals for the past 72 hrs (Last 3 readings):   Weight   07/07/22 0500 97.5 kg (214 lb 15.2 oz)   07/05/22 1358 93.7 kg (206 lb 9.1 oz)       Body mass index is 26.87 kg/m².      Intake/Output Summary (Last 24 hours) at 7/7/2022 1006  Last data filed at 7/7/2022 1000  Gross per 24 hour   Intake 2491.45 ml   Output 5925 ml   Net -3433.55 ml         Hemodynamic Parameters:       Telemetry: ST    Physical Exam  Constitutional:       Comments: Sitting in chair NAD   HENT:      Head: Normocephalic and atraumatic.   Eyes:      Conjunctiva/sclera: Conjunctivae normal.      Pupils: Pupils are equal, round, and reactive to light.   Neck:      Comments: JVD elevation to jawline    Cardiovascular:      Rate and Rhythm: Regular rhythm. Tachycardia present.      Comments: Smooth VAD hum  Pulmonary:      Breath sounds: Normal breath sounds.   Abdominal:      Palpations: Abdomen is soft.   Musculoskeletal:         General: No swelling.      Cervical back: Neck supple.   Skin:     General: Skin is dry.      Capillary Refill: Capillary refill takes 2 to 3 seconds.       Significant Labs:  CBC:  Recent Labs   Lab 07/05/22  0310 07/06/22  0402 07/07/22  0319   WBC 22.05* 21.55* 21.04*   RBC 2.72* 2.56* 2.58*   HGB 7.8* 7.2* 7.1*   HCT 23.8* 22.6* 22.8*    241 302   MCV 88 88 88   MCH 28.7 28.1 27.5   MCHC 32.8 31.9* 31.1*       BNP:  Recent Labs   Lab 07/01/22  0411 07/04/22  0301 07/06/22  0402   * 776* 955*       CMP:  Recent Labs   Lab 07/05/22  0750 07/05/22  1450 07/06/22  0402 07/06/22  1000 07/06/22  1430 07/06/22  2148 07/07/22  0319   *  --  142*  --   --   --  139*   CALCIUM 8.9  --  9.0  --   --   --  8.7   ALBUMIN 3.1*  --  3.2*  --   --   --  3.2*   PROT 6.8  --  6.9  --   --   --  6.8   *  --  128*  --   --   --  128*   K 3.9   < > 4.9  4.8   < > 3.7 4.2 3.6  3.6  3.6   CO2 27  --  28  --   --   --  28   CL 88*  --  86*  --   --   --  88*   BUN 41*  --  40*  --   --   --  34*   CREATININE 1.4  --  1.6*  --   --   --  1.3   ALKPHOS 119  --  147*  --   --   --  146*   *  --  306*  --   --   --  204*   *  --  104*  --   --   --  71*   BILITOT 1.8*  --  1.9*  --   --   --  1.5*    < > = values in this interval not displayed.        Coagulation:   Recent Labs   Lab 07/05/22  0310 07/05/22  0609 07/06/22  0402 07/06/22  1149 07/06/22  1806 07/07/22  0008 07/07/22  0524   INR 1.4*  --  1.8*  --   --   --  2.0*   APTT  --    < > 54.3*   < > 50.4* 51.0* 48.2*    < > = values in this interval not displayed.       LDH:  Recent Labs   Lab 07/05/22  0310 07/06/22  0402 07/07/22  0319   * 597* 544*       Microbiology:  Microbiology Results (last 7  days)       Procedure Component Value Units Date/Time    Blood culture [[id number]] Collected: 07/02/22 1629    Order Status: Completed Specimen: Blood from Peripheral, Hand, Right Updated: 07/06/22 1812     Blood Culture, Routine No Growth to date      No Growth to date      No Growth to date      No Growth to date      No Growth to date    Blood culture [[id number]] Collected: 07/02/22 1618    Order Status: Completed Specimen: Blood from Peripheral, Antecubital, Right Updated: 07/06/22 1812     Blood Culture, Routine No Growth to date      No Growth to date      No Growth to date      No Growth to date      No Growth to date    Blood culture [[id number]] Collected: 06/30/22 1836    Order Status: Completed Specimen: Blood from Peripheral, Wrist, Left Updated: 07/05/22 2012     Blood Culture, Routine No growth after 5 days.    Blood culture [[id number]] Collected: 06/30/22 1833    Order Status: Completed Specimen: Blood from Peripheral, Forearm, Right Updated: 07/05/22 2012     Blood Culture, Routine No growth after 5 days.    Blood culture [[id number]]  (Abnormal)  (Susceptibility) Collected: 06/23/22 0108    Order Status: Completed Specimen: Blood from Peripheral, Hand, Right Updated: 07/01/22 1137     Blood Culture, Routine Gram stain dudley bottle: Gram positive cocci in clusters resembling Staph       Results called to and read back by: Mark Pickett RN  06/24/2022        14:36      STAPHYLOCOCCUS EPIDERMIDIS            I have reviewed all pertinent labs within the past 24 hours.    Estimated Creatinine Clearance: 93 mL/min (based on SCr of 1.3 mg/dL).    Diagnostic Results:  I have reviewed and interpreted all pertinent imaging results/findings within the past 24 hours.    Assessment and Plan:     36 yo male with NIDCM with BiV systolic heart failure, on home Milrinone at 0.375 mcg/kg/min, presented at [facility] 4/2/22 ,was not evaluated for OHT as he has recently quit smoking in April 2022 but was approved for VAD  "with plan to begin OHT evaluation in upcoming months if Mr Queen is stable and suitable for OHT eval (blood group A), 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 Went to ED at [facility] 6/10 but left after waiting 4 hours. Had clinic appointment with us today, but arrived to [facility] early this morning and decided to go to the ED instead. Baseline Lasix dose is 80 mg bid. Reports taking 240 mg qd past 3 days with no improvement. BNP is 1701, up from 898 on 6/2 and 49 on 5/24. sCr is 1.8 with baseline ~ 1.5-1.7. sPO2 on RA is 93%. Wife at bedside    He has been given Lasix 80 mg IVP in the ED with plan to start Lasix gtt at 20 mg/hr           * LVAD (left ventricular assist device) present  -S/P DT HM3 with MVR plus Protek Duo for RV support 6/29 (Grecia)  -Rvad removed with concern for hemolysis, Marcella increased to 20,  -Post op antibiotic per CTS (h/o Staph epi bacteremia, cleareed by ID for surgery with rec to continue antibiotic coverage for 2 weeks post op)  - GASTON trial   -Speed set at 5300 (increased to 5300 on 7/6)   -Echo done 7/5 EF: 10%, LVEDD: 7.13 with speed at 5200.  Would repeat echo in couple days after speed change   -Would increase lasix drip (+ adding metolazone).  - off.  Would consider Primacor to if needed for inotropic support   -INR is 2.0 today (goal 2-3)     Procedure: Device Interrogation Including analysis of device parameters  Current Settings: Ventricular Assist Device  Review of device function is stable    TXP LVAD INTERROGATIONS 7/7/2022 7/7/2022 7/7/2022 7/7/2022 7/7/2022 7/7/2022 7/7/2022   Type HeartMate3 HeartMate3 HeartMate3 HeartMate3 HeartMate3 HeartMate3 " HeartMate3   Flow 4.3 4.7 4.4 4.6 4.6 4.5 5   Speed 5300 5300 5300 5300 5300 5300 5300   PI 3.4 3.2 3.3 3.1 3.7 3.7 3.2   Power (Serna) 3.7 3.8 3.7 3.9 3.9 3.8 3.8   LSL - - - 4900 - - -   Pulsatility - - - Intermittent pulse - - -       Acute on chronic combined systolic and diastolic heart failure, NYHA class 4  - NIDCM  - Was not evaluated for OHTx as he quit smoking in April 2022.   - Received HM3 on 6/29  - See LVAD      Staphylococcus epidermidis bacteremia  -Blood cultures 6/20 and repeat 6/21 positive for Staph Epi. One of two blood cultures from 6/23 positive for Staph (taken prior to line exchange). Repeat cultures sent 6/25 NGTD.   -IJ exchanged on 6/23, IABP exchanged 6/23  -14 day course of vancomycin from VAD implantation on 6/29. End date: 7/12/22        CKD (chronic kidney disease) stage 3, GFR 30-59 ml/min  SCr baseline ~ 1.5-1.7      Hyponatremia  -hypervolemic  -would consider tolvaptan     Uncontrolled diabetes mellitus  Admitted 5/24-5/27 with hyperglycemia hyperosmolar syndrome  -Hgb A1C 9.2 on 5/20/22  -Endocrine following, on insulin gtt    Uninterrupted Critical Care/Counseling Time (not including procedures): 60 minutes      DEYA Martinez  Heart Transplant  [facility] - [facility]

## 2022-07-07 NOTE — ASSESSMENT & PLAN NOTE
Endocrinology consulted for BG management.   BG goal 140-180    - Transition insulin infusion at 1.3 u/hr with step-down parameters. (20% reduction due to improving BG)  - Novolog 14 units TIDWM and prn for BG excursions MDC (150/25) SSI. (20% increase due to prandial BG above goal)   - BG monitoring ac/hs/0200 and moderate dose correction scale.   - Hypoglycemia protocol in place.     ** Please notify Endocrine for any change and/or advance in diet**  ** Please call Endocrine for any BG related issues **    Discharge Planning:   TBD. Please notify endocrinology prior to discharge.

## 2022-07-07 NOTE — ASSESSMENT & PLAN NOTE
-S/P DT HM3 with MVR plus Protek Duo for RV support 6/29 (Grecia)  -Rvad removed with concern for hemolysis, Marcella increased to 20,  -Post op antibiotic per CTS (h/o Staph epi bacteremia, cleareed by ID for surgery with rec to continue antibiotic coverage for 2 weeks post op)  [facility]   -Speed set at 5300 (increased to 5300 on 7/6)   -Echo done 7/5 EF: 10%, LVEDD: 7.13 with speed at 5200.  Would repeat echo in couple days after speed change   -Would increase lasix drip (+ adding metolazone).  - off.  Would consider Primacor to if needed for inotropic support   -INR is 2.0 today (goal 2-3)     Procedure: Device Interrogation Including analysis of device parameters  Current Settings: Ventricular Assist Device  Review of device function is stable    TXP LVAD INTERROGATIONS 7/7/2022 7/7/2022 7/7/2022 7/7/2022 7/7/2022 7/7/2022 7/7/2022   Type HeartMate3 HeartMate3 HeartMate3 HeartMate3 HeartMate3 HeartMate3 HeartMate3   Flow 4.3 4.7 4.4 4.6 4.6 4.5 5   Speed 5300 5300 5300 5300 5300 5300 5300   PI 3.4 3.2 3.3 3.1 3.7 3.7 3.2   Power (Serna) 3.7 3.8 3.7 3.9 3.9 3.8 3.8   LSL - - - 4900 - - -   Pulsatility - - - Intermittent pulse - - -

## 2022-07-07 NOTE — SUBJECTIVE & OBJECTIVE
**Interval History: LVAD speed increased to 5300 yesterday and patient transitioned off . He reports feeling well today.  Has some bilateral ankle pain.  He is net negative 2.8L in the last 24 hours.  Remains volume overloaded on exam.  CVP: 18, SVO2: 29, CO: 5.31, CI: 2.34 SVR: 950.  Would avoid salt tablets in management on hyponatremia as he is hypervolemic.  Would consider Primacor for inotropic support if needed to aid with diuresis.     Continuous Infusions:   sodium chloride 0.9% 5 mL/hr at 06/27/22 0055    sodium chloride 0.9% Stopped (07/05/22 1314)    EPINEPHrine 0.05 mcg/kg/min (07/07/22 1000)    furosemide (LASIX) 2 mg/mL continuous infusion (non-titrating) 20 mg/hr (07/07/22 1000)    heparin (porcine) in 5 % dex Stopped (07/07/22 0924)    insulin regular 1 units/mL infusion orderable (TRANSFER) 1.3 Units/hr (07/07/22 0900)    nicardipine Stopped (07/05/22 2204)    nitric oxide gas       Scheduled Meds:   albumin human 5%  25 g Intravenous Once    amLODIPine  10 mg Oral Daily    DAPTOmycin (CUBICIN)  IV  8 mg/kg Intravenous Q24H    ferrous gluconate  324 mg Oral Daily with breakfast    insulin aspart U-100  14 Units Subcutaneous TIDWM    INV aspirin/placebo  100 mg Oral Daily    LIDOcaine  1 patch Transdermal Q24H    polyethylene glycol  17 g Per NG tube Daily    potassium bicarbonate  50 mEq Oral BID    sodium chloride 0.9%  10 mL Intravenous Q6H    tiZANidine  2 mg Oral Q8H    warfarin  6 mg Oral Daily     PRN Meds:albumin human 5%, albuterol sulfate, bisacodyL, dextrose 10%, dextrose 10%, glucagon (human recombinant), glucose, glucose, hydrALAZINE, HYDROmorphone, insulin aspart U-100, magnesium hydroxide 400 mg/5 ml, magnesium sulfate IVPB, potassium bicarbonate, potassium bicarbonate, potassium bicarbonate, sodium chloride 0.9%, sodium chloride 0.9%, Flushing PICC Protocol **AND** sodium chloride 0.9% **AND** sodium chloride 0.9%, traMADoL    Review of patient's allergies indicates:   Allergen  Reactions    Aspirin Other (See Comments)     Mr. Thacker is enrolled in Dr. Paige's Alon Trial and cannot have any aspirin and/or aspirin-containing products. DO NOT cancel any orders for INV Aspirin 100 mg/Placebo. If you have questions, please contact Isabel @ [phone], [phone],[email or web address], secure chat or MS Teams message.    Bumex [bumetanide] Hives    Lactose Diarrhea     Other reaction(s): Abdominal distension, gaseous    Torsemide Hives     Objective:     Vital Signs (Most Recent):  Temp: 98.3 °F (36.8 °C) (07/07/22 0400)  Pulse: 100 (07/07/22 1000)  Resp: (!) 21 (07/07/22 1000)  BP: (!) 74/0 (07/07/22 0715)  SpO2: 96 % (07/07/22 0830)   Vital Signs (24h Range):  Temp:  [98.3 °F (36.8 °C)-98.9 °F (37.2 °C)] 98.3 °F (36.8 °C)  Pulse:  [] 100  Resp:  [8-41] 21  SpO2:  [95 %-98 %] 96 %  BP: (74-82)/(0) 74/0  Arterial Line BP: (76-92)/(61-81) 83/68     Patient Vitals for the past 72 hrs (Last 3 readings):   Weight   07/07/22 0500 97.5 kg (214 lb 15.2 oz)   07/05/22 1358 93.7 kg (206 lb 9.1 oz)       Body mass index is 26.87 kg/m².      Intake/Output Summary (Last 24 hours) at 7/7/2022 1006  Last data filed at 7/7/2022 1000  Gross per 24 hour   Intake 2491.45 ml   Output 5925 ml   Net -3433.55 ml         Hemodynamic Parameters:       Telemetry: ST    Physical Exam  Constitutional:       Comments: Sitting in chair NAD   HENT:      Head: Normocephalic and atraumatic.   Eyes:      Conjunctiva/sclera: Conjunctivae normal.      Pupils: Pupils are equal, round, and reactive to light.   Neck:      Comments: JVD elevation to jawline   Cardiovascular:      Rate and Rhythm: Regular rhythm. Tachycardia present.      Comments: Smooth VAD hum  Pulmonary:      Breath sounds: Normal breath sounds.   Abdominal:      Palpations: Abdomen is soft.   Musculoskeletal:         General: No swelling.      Cervical back: Neck supple.   Skin:     General: Skin is dry.      Capillary Refill: Capillary refill takes 2  to 3 seconds.       Significant Labs:  CBC:  Recent Labs   Lab 07/05/22  0310 07/06/22  0402 07/07/22  0319   WBC 22.05* 21.55* 21.04*   RBC 2.72* 2.56* 2.58*   HGB 7.8* 7.2* 7.1*   HCT 23.8* 22.6* 22.8*    241 302   MCV 88 88 88   MCH 28.7 28.1 27.5   MCHC 32.8 31.9* 31.1*       BNP:  Recent Labs   Lab 07/01/22  0411 07/04/22  0301 07/06/22  0402   * 776* 955*       CMP:  Recent Labs   Lab 07/05/22  0750 07/05/22  1450 07/06/22  0402 07/06/22  1000 07/06/22  1430 07/06/22  2148 07/07/22 0319   *  --  142*  --   --   --  139*   CALCIUM 8.9  --  9.0  --   --   --  8.7   ALBUMIN 3.1*  --  3.2*  --   --   --  3.2*   PROT 6.8  --  6.9  --   --   --  6.8   *  --  128*  --   --   --  128*   K 3.9   < > 4.9  4.8   < > 3.7 4.2 3.6  3.6  3.6   CO2 27  --  28  --   --   --  28   CL 88*  --  86*  --   --   --  88*   BUN 41*  --  40*  --   --   --  34*   CREATININE 1.4  --  1.6*  --   --   --  1.3   ALKPHOS 119  --  147*  --   --   --  146*   *  --  306*  --   --   --  204*   *  --  104*  --   --   --  71*   BILITOT 1.8*  --  1.9*  --   --   --  1.5*    < > = values in this interval not displayed.        Coagulation:   Recent Labs   Lab 07/05/22  0310 07/05/22  0609 07/06/22  0402 07/06/22  1149 07/06/22  1806 07/07/22  0008 07/07/22  0524   INR 1.4*  --  1.8*  --   --   --  2.0*   APTT  --    < > 54.3*   < > 50.4* 51.0* 48.2*    < > = values in this interval not displayed.       LDH:  Recent Labs   Lab 07/05/22  0310 07/06/22  0402 07/07/22  0319   * 597* 544*       Microbiology:  Microbiology Results (last 7 days)       Procedure Component Value Units Date/Time    Blood culture [[id number]] Collected: 07/02/22 1629    Order Status: Completed Specimen: Blood from Peripheral, Hand, Right Updated: 07/06/22 1812     Blood Culture, Routine No Growth to date      No Growth to date      No Growth to date      No Growth to date      No Growth to date    Blood culture [[id number]]  Collected: 07/02/22 1618    Order Status: Completed Specimen: Blood from Peripheral, Antecubital, Right Updated: 07/06/22 1812     Blood Culture, Routine No Growth to date      No Growth to date      No Growth to date      No Growth to date      No Growth to date    Blood culture [[id number]] Collected: 06/30/22 1836    Order Status: Completed Specimen: Blood from Peripheral, Wrist, Left Updated: 07/05/22 2012     Blood Culture, Routine No growth after 5 days.    Blood culture [[id number]] Collected: 06/30/22 1833    Order Status: Completed Specimen: Blood from Peripheral, Forearm, Right Updated: 07/05/22 2012     Blood Culture, Routine No growth after 5 days.    Blood culture [[id number]]  (Abnormal)  (Susceptibility) Collected: 06/23/22 0108    Order Status: Completed Specimen: Blood from Peripheral, Hand, Right Updated: 07/01/22 1137     Blood Culture, Routine Gram stain dudley bottle: Gram positive cocci in clusters resembling Staph       Results called to and read back by: Mark Pickett RN  06/24/2022        14:36      STAPHYLOCOCCUS EPIDERMIDIS            I have reviewed all pertinent labs within the past 24 hours.    Estimated Creatinine Clearance: 93 mL/min (based on SCr of 1.3 mg/dL).    Diagnostic Results:  I have reviewed and interpreted all pertinent imaging results/findings within the past 24 hours.

## 2022-07-07 NOTE — PROGRESS NOTES
07/07/2022  Casey Arizmendi    Current provider:  Luis F Paige MD    Device interrogation:  TXP LVAD INTERROGATIONS 7/7/2022 7/7/2022 7/7/2022 7/7/2022 7/7/2022 7/7/2022 7/7/2022   Type HeartMate3 HeartMate3 HeartMate3 HeartMate3 HeartMate3 HeartMate3 HeartMate3   Flow 4.6 4.4 4.3 4.6 4.3 4.7 4.4   Speed 5300 5300 5300 5300 5300 5300 5300   PI 2.8 3.3 3.6 2.8 3.4 3.2 3.3   Power (Serna) 3.8 3.8 3.8 3.8 3.7 3.8 3.7   LSL - - - - - - -   Pulsatility - - - - - - -          Rounded on Kevan Queen to ensure all mechanical assist device settings (IABP or VAD) were appropriate and all parameters were within limits.  I was able to ensure all back up equipment was present, the staff had no issues, and the Perfusion Department daily rounding was complete.      For implantable VADs: Interrogation of Ventricular assist device was performed with analysis of device parameters and review of device function. I have personally reviewed the interrogation findings and agree with findings as stated.     In emergency, the nursing units have been notified to contact the perfusion department either by:  Calling [id number] from 630am to 4pm Mon thru Fri, utilizing the On-Call Finder functionality of Epic and searching for Perfusion, or by contacting the hospital  from 4pm to 630am and on weekends and asking to speak with the perfusionist on call.    3:01 PM

## 2022-07-07 NOTE — PLAN OF CARE
SICU PLAN OF CARE NOTE    Dx: LVAD (left ventricular assist device) present    Shift Events: Epi weaned to 0.05. Marcella weaned to 2.5 ppm. OOBTC for majority of shift.     Gtts:     Insulin 0.8   Epi 0.05  Lasix 20    Neuro: AAO x4, Follows Commands and Moves All Extremities    Cardiac: NSR to ST, HR 90-100s    HM3  Flows: 4.2-4.6  PI: 2.4-3.5  Power: 3.7-3.8    Respiratory: 5 L NC with 2.5 ppm Marcella    GI: Cardiac Diet/1500 mL FR    : Voids Spontaneously 1450 cc/shift     Labs/Accuchecks: Q6h Mg, K. Accuchecks ACHS.    Skin: No skin breakdown noted. Prevena removed, midsternal incision painted with betadine, island/boarder applied. Daily LVAD dressing change completed by RN. Bed plugged in and appropriately inflated. Heel and sacral foams in place. Weight shift assistance provided throughout shift.

## 2022-07-07 NOTE — SUBJECTIVE & OBJECTIVE
"Interval HPI:   Overnight events: No acute events overnight. Patient on the SICU in room 16382 CVICU/69922 CVICU A. Blood glucose improving. BG at and below goal on current insulin regimen (Transition Insulin Drip).. Steroid use- None. 7 Days Post-Op  Renal function- Abnormal  Vasopressors-  Epinephrine     Eating: Diet Cardiac [facility]; Consistent Carbohydrate; Isolation Tray - Regular [place]; Fluid - 1500mL  100%  Nausea: No  Hypoglycemia and intervention: No  Fever: No  TPN and/or TF: No      BP (!) 74/0 (BP Location: Left arm, Patient Position: Sitting)   Pulse 99   Temp 98.3 °F (36.8 °C) (Oral)   Resp 17   Ht 6' 3" (1.905 m)   Wt 97.5 kg (214 lb 15.2 oz)   SpO2 96%   BMI 26.87 kg/m²     Labs Reviewed and Include    Recent Labs   Lab 07/07/22  0319   *   CALCIUM 8.7   ALBUMIN 3.2*   PROT 6.8   *   K 3.6  3.6  3.6   CO2 28   CL 88*   BUN 34*   CREATININE 1.3   ALKPHOS 146*   *   AST 71*   BILITOT 1.5*     Lab Results   Component Value Date    WBC 21.04 (H) 07/07/2022    HGB 7.1 (L) 07/07/2022    HCT 22.8 (L) 07/07/2022    MCV 88 07/07/2022     07/07/2022     No results for input(s): TSH, FREET4 in the last 168 hours.  Lab Results   Component Value Date    HGBA1C 9.2 (H) 05/20/2022       Nutritional status:   Body mass index is 26.87 kg/m².  Lab Results   Component Value Date    ALBUMIN 3.2 (L) 07/07/2022    ALBUMIN 3.2 (L) 07/06/2022    ALBUMIN 3.1 (L) 07/05/2022     Lab Results   Component Value Date    PREALBUMIN 12 (L) 07/07/2022    PREALBUMIN 19 (L) 06/30/2022    PREALBUMIN 36 04/18/2022       Estimated Creatinine Clearance: 93 mL/min (based on SCr of 1.3 mg/dL).    Accu-Checks  Recent Labs     07/05/22  1137 07/05/22  1714 07/05/22 2053 07/05/22  2125 07/06/22  0400 07/06/22  0812 07/06/22  1145 07/06/22  1649 07/06/22  2147 07/07/22  0155   POCTGLUCOSE 201* 117* 98 104 143* 238* 263* 204* 169* 128*       Current Medications and/or Treatments Impacting Glycemic " Control  Immunotherapy:    Immunosuppressants       None          Steroids:   Hormones (From admission, onward)                None          Pressors:    Autonomic Drugs (From admission, onward)                Start     Stop Route Frequency Ordered    07/06/22 1000  EPINEPHrine (ADRENALIN) 10 mg in dextrose 5 % 250 mL infusion         -- IV Continuous 07/06/22 0953          Hyperglycemia/Diabetes Medications:   Antihyperglycemics (From admission, onward)                Start     Stop Route Frequency Ordered    07/07/22 1130  insulin aspart U-100 pen 14 Units         -- SubQ 3 times daily with meals 07/07/22 0854    07/07/22 0900  insulin regular in 0.9 % NaCl 100 unit/100 mL (1 unit/mL) infusion        Question:  Enter initial dose (Units/hr):  Answer:  1.3    -- IV Continuous 07/07/22 0853    07/01/22 1833  insulin aspart U-100 pen 0-10 Units         -- SubQ As needed (PRN) 07/01/22 6116

## 2022-07-07 NOTE — PROGRESS NOTES
Update    Pt presents in room aaox4 with open affect. Pt's caregiver is present and pt is seated in chair. Pt voices understanding of plan of care. Pt reports coping well and states he is processing changes he has gone through. Pt and caregiver deny questions or concerns at this time. Providing ongoing psychosocial and counseling support, education, resources, assistance and discharge planning as indicated. Following and available.

## 2022-07-07 NOTE — PT/OT/SLP PROGRESS
Physical Therapy Treatment    Patient Name:  Kevan Queen   MRN:  [id number]    Recommendations:     Discharge Recommendations:   inpt rehab   Discharge Equipment Recommendations:  (will determine DME needs closer to discharge)   Barriers to discharge: None    Assessment:     Kevan Queen is a 37 y.o. male admitted with a medical diagnosis of LVAD (left ventricular assist device) present.  He presents with the following impairments/functional limitations:  weakness, impaired endurance, impaired functional mobilty, gait instability, impaired balance, decreased safety awareness, pain, decreased lower extremity function pt tolerated treatment well but pain limited functional mobility. Pt will benefit from cont skilled PT 6x/wk to progress physically. Pt will benefit from inpt rehab when medically stable to maximize functional mobility.  Pt is s/p LVAD .    Rehab Prognosis: Good; patient would benefit from acute skilled PT services to address these deficits and reach maximum level of function.    Recent Surgery: Procedure(s) (LRB):  CLOSURE, WOUND, STERNUM (N/A)  INSERTION-RIGHT VENTRICULAR ASSIST DEVICE (Right)  APPLICATION, WOUND VAC (N/A)  IRRIGATION, MEDIASTINUM 7 Days Post-Op    Plan:     During this hospitalization, patient to be seen 6 x/week to address the identified rehab impairments via gait training, therapeutic activities and progress toward the following goals:    · Plan of Care Expires:  08/01/22    Subjective     Chief Complaint: pt c/o pain in B ankles and chest during treatment.   Patient/Family Comments/goals: to be able to return home.   Pain/Comfort:  · Pain Rating 1:  (9 chest and 7 B ankles)  · Pain Addressed 1: Reposition  · Pain Rating Post-Intervention 1:  (see above)      Objective:     Communicated with nurse prior to session.  Patient found up in chair with telemetry, arterial line, oxygen, wound vac, PICC line, LVAD (CVP, nitric oxide) upon PT entry to room.     General Precautions:  Standard, LVAD, fall, sternal   Orthopedic Precautions:N/A   Braces:    Respiratory Status: nitric oxide     Functional Mobility:  · Not tested due to pain in B ankles . Pt stated that he did not think he could stand.       AM-PAC 6 CLICK MOBILITY  Turning over in bed (including adjusting bedclothes, sheets and blankets)?: 2  Sitting down on and standing up from a chair with arms (e.g., wheelchair, bedside commode, etc.): 2  Moving from lying on back to sitting on the side of the bed?: 2  Moving to and from a bed to a chair (including a wheelchair)?: 1  Need to walk in hospital room?: 1  Climbing 3-5 steps with a railing?: 1  Basic Mobility Total Score: 9       Therapeutic Activities and Exercises:   pt received verbal instruction and moderate assist to switch from LVAD power base to/from  battery and use consolidation bag. Pt received verbal instructions in use of emergency bag and contents of bag and verbal instruction in correct names for LVAD components.  Pt will need re-instruction of such. Pt was left on power base after treatment.     Patient left up in chair with all lines intact, call button in reach and RN notified..    GOALS:   Multidisciplinary Problems     Physical Therapy Goals        Problem: Physical Therapy    Goal Priority Disciplines Outcome Goal Variances Interventions   Physical Therapy Goal     PT, PT/OT Ongoing, Progressing     Description: Goals to be met by: 2022    Patient will increase functional independence with mobility by performin. Supine to sit with MInimal Assistance -not met  3. Sit to stand transfer with Minimal Assistance -not met  4. Gait  x 50 feet with Contact Guard Assistance -not met.                  Multidisciplinary Problems (Resolved)        Problem: Physical Therapy    Goal Priority Disciplines Outcome Goal Variances Interventions   Physical Therapy Goal   (Resolved)     PT, PT/OT Met     Description:     Problem: Physical Therapy  Goal: Physical Therapy  Goal  Description: Goals to be met by: 2022     Patient will increase functional independence with mobility by performin. Lower extremity exercise program without IABP 30 reps per handout, with independence  2.Upper extremity exercise program 30 reps per handout, with independence                         Time Tracking:     PT Received On: 22  PT Start Time: 839     PT Stop Time: 902  PT Total Time (min): 23 min     Billable Minutes: Therapeutic Activity 23 min    Treatment Type: Treatment  PT/PTA: PT     PTA Visit Number: 0     2022

## 2022-07-07 NOTE — SUBJECTIVE & OBJECTIVE
Interval History/Significant Events: No acute events overnight. Tolerated removal of dobutamine. Nitric weaned to 5 with no hemodynamic changes or evidence of decreased perfusion. Hemodynamically stable on 0.06 of epi. LVAD speed and flows without change.     Follow-up For: Procedure(s) (LRB):  CLOSURE, WOUND, STERNUM (N/A)  INSERTION-RIGHT VENTRICULAR ASSIST DEVICE (Right)  APPLICATION, WOUND VAC (N/A)  IRRIGATION, MEDIASTINUM    Post-Operative Day: 7 Days Post-Op    Objective:     Vital Signs (Most Recent):  Temp: 98.3 °F (36.8 °C) (07/07/22 0400)  Pulse: 100 (07/07/22 0615)  Resp: 20 (07/07/22 0615)  BP: (!) 80/0 (07/07/22 0500)  SpO2: 96 % (07/07/22 0500)   Vital Signs (24h Range):  Temp:  [98.3 °F (36.8 °C)-98.9 °F (37.2 °C)] 98.3 °F (36.8 °C)  Pulse:  [] 100  Resp:  [8-37] 20  SpO2:  [95 %-98 %] 96 %  BP: (78-82)/(0) 80/0  Arterial Line BP: (70-92)/(53-81) 86/71     Weight: 97.5 kg (214 lb 15.2 oz)  Body mass index is 26.87 kg/m².      Intake/Output Summary (Last 24 hours) at 7/7/2022 0629  Last data filed at 7/7/2022 0600  Gross per 24 hour   Intake 2897.28 ml   Output 5750 ml   Net -2852.72 ml       Physical Exam  Vitals reviewed.   Constitutional:       General: He is not in acute distress.  HENT:      Head: Normocephalic and atraumatic.      Nose: Nose normal.      Mouth/Throat:      Mouth: Mucous membranes are moist.   Eyes:      Pupils: Pupils are equal, round, and reactive to light.   Neck:      Comments:     Cardiovascular:      Rate and Rhythm: Normal rate and regular rhythm.      Pulses: Normal pulses.      Comments: S/p chest closure. Incision c/d/I with prevena in place.     LVAD speed 5300  Flows ~4L    L brachial art line      Pulmonary:      Effort: Pulmonary effort is normal. No respiratory distress.      Comments: On 4L NC  Abdominal:      General: Abdomen is flat. There is no distension.      Palpations: Abdomen is soft.   Musculoskeletal:      Comments: IABP site at left fem with  dressing in place.    R PICC   Skin:     General: Skin is warm and dry.      Capillary Refill: Capillary refill takes less than 2 seconds.   Neurological:      General: No focal deficit present.      Mental Status: He is alert and oriented to person, place, and time.       Vents:  Vent Mode: Spont (07/01/22 1205)  Ventilator Initiated: Yes (06/29/22 1527)  Set Rate: 18 BPM (07/01/22 0737)  Vt Set: 580 mL (07/01/22 0737)  Pressure Support: 8 cmH20 (07/01/22 1205)  PEEP/CPAP: 5 cmH20 (07/01/22 1205)  Oxygen Concentration (%): 36 (07/05/22 0328)  Peak Airway Pressure: 15 cmH2O (07/01/22 1205)  Plateau Pressure: 21 cmH20 (07/01/22 1205)  Total Ve: 17 mL (07/01/22 1205)  Negative Inspiratory Force (cm H2O): -13 (07/01/22 1205)  F/VT Ratio<105 (RSBI): (!) 70.48 (07/01/22 1205)    Lines/Drains/Airways       Peripherally Inserted Central Catheter Line  Duration             PICC Triple Lumen 07/05/22 1554 right brachial 1 day              Arterial Line  Duration             Arterial Line 06/29/22 0832 Left Brachial 7 days              Line  Duration                  VAD 06/29/22 1115 Left ventricular assist device HeartMate 3 7 days              Peripheral Intravenous Line  Duration                  Peripheral IV - Single Lumen 07/02/22 2226 20 G Anterior;Distal;Left Forearm 4 days                    Significant Labs:    CBC/Anemia Profile:  Recent Labs   Lab 07/06/22  0402 07/07/22  0319   WBC 21.55* 21.04*   HGB 7.2* 7.1*   HCT 22.6* 22.8*    302   MCV 88 88   RDW 16.1* 16.0*        Chemistries:  Recent Labs   Lab 07/05/22  0750 07/05/22  1450 07/06/22  0402 07/06/22  1000 07/06/22  1430 07/06/22  2148 07/07/22  0319   *  --  128*  --   --   --  128*   K 3.9   < > 4.9  4.8   < > 3.7 4.2 3.6  3.6  3.6   CL 88*  --  86*  --   --   --  88*   CO2 27  --  28  --   --   --  28   BUN 41*  --  40*  --   --   --  34*   CREATININE 1.4  --  1.6*  --   --   --  1.3   CALCIUM 8.9  --  9.0  --   --   --  8.7   ALBUMIN  3.1*  --  3.2*  --   --   --  3.2*   PROT 6.8  --  6.9  --   --   --  6.8   BILITOT 1.8*  --  1.9*  --   --   --  1.5*   ALKPHOS 119  --  147*  --   --   --  146*   *  --  306*  --   --   --  204*   *  --  104*  --   --   --  71*   MG  --    < > 2.5   < > 2.4 2.5 2.5  2.5   PHOS  --   --  3.9  --   --   --  3.3    < > = values in this interval not displayed.       ABGs:   Recent Labs   Lab 07/05/22  2130   PH 7.462*   PCO2 37.9   HCO3 27.1   POCSATURATED 98   BE 3     Coagulation:   Recent Labs   Lab 07/07/22  0524   INR 2.0*   APTT 48.2*     All pertinent labs within the past 24 hours have been reviewed.    Significant Imaging:  I have reviewed all pertinent imaging results/findings within the past 24 hours.

## 2022-07-07 NOTE — ASSESSMENT & PLAN NOTE
-Blood cultures 6/20 and repeat 6/21 positive for Staph Epi. One of two blood cultures from 6/23 positive for Staph (taken prior to line exchange). Repeat cultures sent 6/25 NGTD.   -IJ exchanged on 6/23, IABP exchanged 6/23  -14 day course of vancomycin from VAD implantation on 6/29. End date: 7/12/22

## 2022-07-07 NOTE — PT/OT/SLP PROGRESS
Occupational Therapy   Treatment    Name: Kevan Queen  MRN: [id number]  Admitting Diagnosis:  LVAD (left ventricular assist device) present  7 Days Post-Op    Recommendations:     Discharge Recommendations: home    Assessment:     Kevan Queen is a 37 y.o. male with a medical diagnosis of LVAD (left ventricular assist device) present.   Performance deficits affecting function are weakness, impaired endurance, impaired self care skills, impaired functional mobilty, gait instability, impaired balance, pain Pt limited with functional mobility skills this date due to B ankle pain.  Pt engaged with seated self care and review of UE exs program.  Pt to benefit from cont OT to address stated goals.     Rehab Prognosis:  Good; patient would benefit from acute skilled OT services to address these deficits and reach maximum level of function.       Plan:     Patient to be seen 6 x/week to address the above listed problems via self-care/home management, therapeutic activities, therapeutic exercises  · Plan of Care Expires: 08/02/22  · Plan of Care Reviewed with: patient    Subjective     Pain/Comfort:  ·  Pt reports 9/10 chest pain   · Pt reports 7/10 B ankle pain.  · Repositioning provided  and nsg notified of pain     Objective:     Communicated with: nsg prior to session.  Patient found seated in chair with wound vac, tele, pulse ox, BP cuff, LVAD to wall power, NO.      General Precautions: Standard, fall, LVAD, sternal     Occupational Performance:   · Pt unable to stand due to B ankle pain. Pt engaged with seated g/h skills with set-up. Pt used left dominant hand for oral care. Pt with appropriate sequencing noted of task.     Pt reports he completed UE HEP as educated last date. Pt demo understanding of exs and reports not further questions.       [facility] 6 Click ADL: 10      Patient left up in chair with all lines intact, call button in reach and nsg notifiedEducation:      GOALS:   Multidisciplinary Problems      Occupational Therapy Goals        Problem: Occupational Therapy    Goal Priority Disciplines Outcome Interventions   Occupational Therapy Goal     OT, PT/OT Ongoing, Progressing    Description: Goals to be met by: 8/2/22     Patient will increase functional independence with ADLs by performing:    UE Dressing with Modified [place].  LE Dressing with Modified [place] and Assistive Devices as needed.  Grooming while standing at sink with Modified [place].  Toileting from toilet with Modified [place] for hygiene and clothing management.   Bathing from  shower chair/bench with Modified [place].  Toilet transfer to toilet with Modified [place].  Increased functional strength to WFL for B UE.  Upper extremity exercise program x15 reps per handout, with assistance as needed.  Pt will be I in all LVAD management.                     Time Tracking:     OT Date of Treatment: 07/07/22  OT Start Time: 0839  OT Stop Time: 0853  OT Total Time (min): 14 min    Billable Minutes:Self Care/Home Management 14    OT/MARTÍNEZ: OT          7/7/2022

## 2022-07-07 NOTE — PROGRESS NOTES
[facility] - Surgical Intensive Care  Critical Care - Surgery  Progress Note    Patient Name: Kevan Queen  MRN: [id number]  Admission Date: 6/13/2022  Hospital Length of Stay: 24 days  Code Status: Full Code  Attending Provider: Luis F Paige MD  Primary Care Provider: ORALIA Cline   Principal Problem: LVAD (left ventricular assist device) present    Subjective:     Hospital/ICU Course:  No notes on file    Interval History/Significant Events: No acute events overnight. Tolerated removal of dobutamine. Nitric weaned to 5 with no hemodynamic changes or evidence of decreased perfusion. Hemodynamically stable on 0.06 of epi. LVAD speed and flows without change.     Follow-up For: Procedure(s) (LRB):  CLOSURE, WOUND, STERNUM (N/A)  INSERTION-RIGHT VENTRICULAR ASSIST DEVICE (Right)  APPLICATION, WOUND VAC (N/A)  IRRIGATION, MEDIASTINUM    Post-Operative Day: 7 Days Post-Op    Objective:     Vital Signs (Most Recent):  Temp: 98.3 °F (36.8 °C) (07/07/22 0400)  Pulse: 100 (07/07/22 0615)  Resp: 20 (07/07/22 0615)  BP: (!) 80/0 (07/07/22 0500)  SpO2: 96 % (07/07/22 0500)   Vital Signs (24h Range):  Temp:  [98.3 °F (36.8 °C)-98.9 °F (37.2 °C)] 98.3 °F (36.8 °C)  Pulse:  [] 100  Resp:  [8-37] 20  SpO2:  [95 %-98 %] 96 %  BP: (78-82)/(0) 80/0  Arterial Line BP: (70-92)/(53-81) 86/71     Weight: 97.5 kg (214 lb 15.2 oz)  Body mass index is 26.87 kg/m².      Intake/Output Summary (Last 24 hours) at 7/7/2022 0629  Last data filed at 7/7/2022 0600  Gross per 24 hour   Intake 2897.28 ml   Output 5750 ml   Net -2852.72 ml       Physical Exam  Vitals reviewed.   Constitutional:       General: He is not in acute distress.  HENT:      Head: Normocephalic and atraumatic.      Nose: Nose normal.      Mouth/Throat:      Mouth: Mucous membranes are moist.   Eyes:      Pupils: Pupils are equal, round, and reactive to light.   Neck:      Comments:     Cardiovascular:      Rate and Rhythm: Normal rate and regular rhythm.       Pulses: Normal pulses.      Comments: S/p chest closure. Incision c/d/I with prevena in place.     LVAD speed 5300  Flows ~4L    L brachial art line      Pulmonary:      Effort: Pulmonary effort is normal. No respiratory distress.      Comments: On 4L NC  Abdominal:      General: Abdomen is flat. There is no distension.      Palpations: Abdomen is soft.   Musculoskeletal:      Comments: IABP site at left fem with dressing in place.    R PICC   Skin:     General: Skin is warm and dry.      Capillary Refill: Capillary refill takes less than 2 seconds.   Neurological:      General: No focal deficit present.      Mental Status: He is alert and oriented to person, place, and time.       Vents:  Vent Mode: Spont (07/01/22 1205)  Ventilator Initiated: Yes (06/29/22 1527)  Set Rate: 18 BPM (07/01/22 0737)  Vt Set: 580 mL (07/01/22 0737)  Pressure Support: 8 cmH20 (07/01/22 1205)  PEEP/CPAP: 5 cmH20 (07/01/22 1205)  Oxygen Concentration (%): 36 (07/05/22 0328)  Peak Airway Pressure: 15 cmH2O (07/01/22 1205)  Plateau Pressure: 21 cmH20 (07/01/22 1205)  Total Ve: 17 mL (07/01/22 1205)  Negative Inspiratory Force (cm H2O): -13 (07/01/22 1205)  F/VT Ratio<105 (RSBI): (!) 70.48 (07/01/22 1205)    Lines/Drains/Airways       Peripherally Inserted Central Catheter Line  Duration             PICC Triple Lumen 07/05/22 1554 right brachial 1 day              Arterial Line  Duration             Arterial Line 06/29/22 0832 Left Brachial 7 days              Line  Duration                  VAD 06/29/22 1115 Left ventricular assist device HeartMate 3 7 days              Peripheral Intravenous Line  Duration                  Peripheral IV - Single Lumen 07/02/22 2226 20 G Anterior;Distal;Left Forearm 4 days                    Significant Labs:    CBC/Anemia Profile:  Recent Labs   Lab 07/06/22  0402 07/07/22  0319   WBC 21.55* 21.04*   HGB 7.2* 7.1*   HCT 22.6* 22.8*    302   MCV 88 88   RDW 16.1* 16.0*        Chemistries:  Recent  Labs   Lab 07/05/22  0750 07/05/22  1450 07/06/22  0402 07/06/22  1000 07/06/22  1430 07/06/22  2148 07/07/22  0319   *  --  128*  --   --   --  128*   K 3.9   < > 4.9  4.8   < > 3.7 4.2 3.6  3.6  3.6   CL 88*  --  86*  --   --   --  88*   CO2 27  --  28  --   --   --  28   BUN 41*  --  40*  --   --   --  34*   CREATININE 1.4  --  1.6*  --   --   --  1.3   CALCIUM 8.9  --  9.0  --   --   --  8.7   ALBUMIN 3.1*  --  3.2*  --   --   --  3.2*   PROT 6.8  --  6.9  --   --   --  6.8   BILITOT 1.8*  --  1.9*  --   --   --  1.5*   ALKPHOS 119  --  147*  --   --   --  146*   *  --  306*  --   --   --  204*   *  --  104*  --   --   --  71*   MG  --    < > 2.5   < > 2.4 2.5 2.5  2.5   PHOS  --   --  3.9  --   --   --  3.3    < > = values in this interval not displayed.       ABGs:   Recent Labs   Lab 07/05/22  2130   PH 7.462*   PCO2 37.9   HCO3 27.1   POCSATURATED 98   BE 3     Coagulation:   Recent Labs   Lab 07/07/22  0524   INR 2.0*   APTT 48.2*     All pertinent labs within the past 24 hours have been reviewed.    Significant Imaging:  I have reviewed all pertinent imaging results/findings within the past 24 hours.    Assessment/Plan:     Acute on chronic combined systolic and diastolic heart failure, NYHA class 4  Kevan Queen is a 37yoM with a history of polysubstance abuse who presented to the hospital with decompensated heart failure. He underwent LVAD, RVAD and MVr on 6/29/22. He is admitted to the ICU post-operatively.       Neuro/Psych:   -- Sedation: none  -- Pain: Added scheduled tizanidine, lidocaine patch; PRN Tramadol and dilaudid for breakthough  with improvement in pain control         Cards:   -- s/p LVAD, RVAD insertion and MVr. RVAD removed 7/1  -- HDS on epi 0.06, dobutamine off; Maintain epi  -- s/p chest closure 6/30   -- MAP goal 75-85  -- Off cardene, PRN hydral available, continue norvasc      Pulm:   -- Goal O2 sat > 90%  -- ABG PRN  -- Extubated 7/1  -- iNO2 @ 5ppm;  wean as tolerated starting today with goal of having nitric off by tomorrow morning  -- daily CXR      Renal:  -- Trend BUN/Cr   -- lasix gtt at 20  -- Goal net negative under 1L       FEN / GI:   -- Replace lytes as needed  -- Nutrition: cardiac  -- GI ppx: famotidine  -- Bowel reg: miralax  -- Hepatology consult to acute liver failure, appreciate recs. Liver enzymes improving.      ID:   -- Tm:afebrile; WBC stable  -- Currently on dapto, last day tomorrow  -- Blood Cx from 7/2, remain negative      Heme/Onc:   -- H/H stable   -- Daily CBC  -- no intra-op product administered  -- 2u autologous  -- 1u pRBC, 1u FFP, 500mL albumin overnight (6/29)  -- Heparin gtt, decreased to 1400; Warfarin 6 mg QD  -- PTT goal of 45-54      Endo:   -- BG goal 140-180  -- Insulin gtt  -- endocrine consulted      PPx:   Feeding: cardiac  Analgesia/Sedation:none / PRN oxy,Fentanyl  Thromboembolic prevention: SCDs, heparin gtt, warfarin   HOB >30: Yes  Stress Ulcer ppx: famotidine  Glucose control: Critical care goal 140-180 g/dl, ISS     Lines/Drains/Airway: R PICC, L brachial A-line; LVAD      Dispo/Code Status/Palliative:   -- SICU / Full Code.            Rebeca Horvath MD  Critical Care - Surgery  [facility] - Surgical Intensive Care

## 2022-07-07 NOTE — PROGRESS NOTES
Interdisciplinary Rounds Report:   Attended interdisciplinary rounds with the [facility]/CTS services including the LVAD Coordinators, social workers, cardiologists, surgeons,  PT/OT/Speech, dietician, and unit charge nurses. Discussed patient status, plan of care, goals of care, including DC date, and post discharge needs. Plan of care will be discussed with the patient and/or family per the physician while rounding on the floor. This is a recurring meeting that is medically and socially necessary to collaborate with the interdisciplinary team to assist patient needs and safe discharge.

## 2022-07-07 NOTE — PROGRESS NOTES
"[facility] - [facility]  Endocrinology  Progress Note    Admit Date: 6/13/2022     Reason for Consult: Management of  type 2 DM, Hyperglycemia     Surgical Procedure and Date: none    Diabetes diagnosis year: 4/21/21    Home Diabetes Medications:  Detemir  23  units daily and Aspart   12  units TIDWM and  Mod dose correction insulin    Lab Results   Component Value Date    HGBA1C 9.2 (H) 05/20/2022           How often checking glucose at home? 1-3 x day   BG readings on regimen: 180- up to 300 once  Hypoglycemia on the regimen?  yes once- related to not really eating after taking aspart   Missed doses on regimen?  no    Diabetes Complications include:     Hyperglycemia    Complicating diabetes co morbidities:   History of MI, CHF, and CKD      HPI:   Patient is a 37 y.o. male with a diagnosis of type 2 DM and NIDCM with BiV systolic heart failure. Patient was presented at [facility] 4/2/22  and was  approved for VAD. Also recently admitted with [facility]; he had clinic appointment last week to f/u recent admit for hyperglycemic hyperosmolar syndrome but did not come as he was "feeling too bad" presents to  ED with SOB. Endocrinology consulted for BG/ DM management.                Interval HPI:   Overnight events: No acute events overnight. Patient on the SICU in room 22547 CVICU/07152 CVICU A. Blood glucose improving. BG at and below goal on current insulin regimen (Transition Insulin Drip).. Steroid use- None. 7 Days Post-Op  Renal function- Abnormal  Vasopressors-  Epinephrine     Eating: Diet [facility]; Consistent Carbohydrate; Isolation Tray - Regular [place]; Fluid - 1500mL  100%  Nausea: No  Hypoglycemia and intervention: No  Fever: No  TPN and/or TF: No      BP (!) 74/0 (BP Location: Left arm, Patient Position: Sitting)   Pulse 99   Temp 98.3 °F (36.8 °C) (Oral)   Resp 17   Ht 6' 3" (1.905 m)   Wt 97.5 kg (214 lb 15.2 oz)   SpO2 96%   BMI 26.87 kg/m²     Labs Reviewed and Include  "   Recent Labs   Lab 07/07/22  0319   *   CALCIUM 8.7   ALBUMIN 3.2*   PROT 6.8   *   K 3.6  3.6  3.6   CO2 28   CL 88*   BUN 34*   CREATININE 1.3   ALKPHOS 146*   *   AST 71*   BILITOT 1.5*     Lab Results   Component Value Date    WBC 21.04 (H) 07/07/2022    HGB 7.1 (L) 07/07/2022    HCT 22.8 (L) 07/07/2022    MCV 88 07/07/2022     07/07/2022     No results for input(s): TSH, FREET4 in the last 168 hours.  Lab Results   Component Value Date    HGBA1C 9.2 (H) 05/20/2022       Nutritional status:   Body mass index is 26.87 kg/m².  Lab Results   Component Value Date    ALBUMIN 3.2 (L) 07/07/2022    ALBUMIN 3.2 (L) 07/06/2022    ALBUMIN 3.1 (L) 07/05/2022     Lab Results   Component Value Date    PREALBUMIN 12 (L) 07/07/2022    PREALBUMIN 19 (L) 06/30/2022    PREALBUMIN 36 04/18/2022       Estimated Creatinine Clearance: 93 mL/min (based on SCr of 1.3 mg/dL).    Accu-Checks  Recent Labs     07/05/22  1137 07/05/22  1714 07/05/22  2053 07/05/22  2125 07/06/22  0400 07/06/22  0812 07/06/22  1145 07/06/22  1649 07/06/22  2147 07/07/22  0155   POCTGLUCOSE 201* 117* 98 104 143* 238* 263* 204* 169* 128*       Current Medications and/or Treatments Impacting Glycemic Control  Immunotherapy:    Immunosuppressants       None          Steroids:   Hormones (From admission, onward)                None          Pressors:    Autonomic Drugs (From admission, onward)                Start     Stop Route Frequency Ordered    07/06/22 1000  EPINEPHrine (ADRENALIN) 10 mg in dextrose 5 % 250 mL infusion         -- IV Continuous 07/06/22 0953          Hyperglycemia/Diabetes Medications:   Antihyperglycemics (From admission, onward)                Start     Stop Route Frequency Ordered    07/07/22 1130  insulin aspart U-100 pen 14 Units         -- SubQ 3 times daily with meals 07/07/22 0854    07/07/22 0900  insulin regular in 0.9 % NaCl 100 unit/100 mL (1 unit/mL) infusion        Question:  Enter initial dose  (Units/hr):  Answer:  1.3    -- IV Continuous 07/07/22 0853    07/01/22 1833  insulin aspart U-100 pen 0-10 Units         -- SubQ As needed (PRN) 07/01/22 1733            ASSESSMENT and PLAN    * LVAD (left ventricular assist device) present  Managed per primary team  Avoid hypoglycemia        Uncontrolled diabetes mellitus  Endocrinology consulted for BG management.   BG goal 140-180    - Transition insulin infusion at 1.3 u/hr with step-down parameters. (20% reduction due to improving BG)  - Novolog 14 units TIDWM and prn for BG excursions [facility] (150/25) SSI. (20% increase due to prandial BG above goal)   - BG monitoring ac/hs/0200 and moderate dose correction scale.   - Hypoglycemia protocol in place.     ** Please notify Endocrine for any change and/or advance in diet**  ** Please call Endocrine for any BG related issues **    Discharge Planning:   TBD. Please notify endocrinology prior to discharge.        Acute on chronic combined systolic and diastolic heart failure, NYHA class 4  Optimize glucose control and  avoid hypoglycemia    Managed per primary.       CKD (chronic kidney disease) stage 3, GFR 30-59 ml/min    Titrate insulin slowly to avoid hypoglycemia as the risk of hypoglycemia increases with decreased creatinine clearance.    Estimated Creatinine Clearance: 93 mL/min (based on SCr of 1.3 mg/dL).      Surgical wound present  Optimize BG for surgical wound healing.              Michael Wyman, DNP, FNP  Endocrinology  [facility] - [facility]

## 2022-07-07 NOTE — PLAN OF CARE
Problem: Physical Therapy  Goal: Physical Therapy Goal  Description: Goals to be met by: 2022    Patient will increase functional independence with mobility by performin. Supine to sit with MInimal Assistance -not met  3. Sit to stand transfer with Minimal Assistance -not met  4. Gait  x 50 feet with Contact Guard Assistance -not met.       Outcome: Ongoing, Progressing   Pt goals updated for time frame and are appropriate. 2022

## 2022-07-07 NOTE — ASSESSMENT & PLAN NOTE
Kevan Queen is a 37yoM with a history of polysubstance abuse who presented to the hospital with decompensated heart failure. He underwent LVAD, RVAD and MVr on 6/29/22. He is admitted to the ICU post-operatively.       Neuro/Psych:   -- Sedation: none  -- Pain: Added scheduled tizanidine, lidocaine patch; PRN Tramadol and dilaudid for breakthough  with improvement in pain control         Cards:   -- s/p LVAD, RVAD insertion and MVr. RVAD removed 7/1  -- HDS on epi 0.06, dobutamine off; Maintain epi  -- s/p chest closure 6/30   -- MAP goal 75-85  -- Off cardene, PRN hydral available, continue norvasc      Pulm:   -- Goal O2 sat > 90%  -- ABG PRN  -- Extubated 7/1  -- iNO2 @ 5ppm; wean as tolerated starting today with goal of having nitric off by tomorrow morning  -- daily CXR      Renal:  -- Trend BUN/Cr   -- lasix gtt at 20  -- Goal net negative under 1L       FEN / GI:   -- Replace lytes as needed  -- Nutrition: cardiac  -- GI ppx: famotidine  -- Bowel reg: miralax  -- Hepatology consult to acute liver failure, appreciate recs. Liver enzymes improving.      ID:   -- Tm:afebrile; WBC stable  -- Currently on dapto, last day tomorrow  -- Blood Cx from 7/2, remain negative      Heme/Onc:   -- H/H stable   -- Daily CBC  -- no intra-op product administered  -- 2u autologous  -- 1u pRBC, 1u FFP, 500mL albumin overnight (6/29)  -- Heparin gtt, decreased to 1400; Warfarin 6 mg QD  -- PTT goal of 45-54      Endo:   -- BG goal 140-180  -- Insulin gtt  -- endocrine consulted      PPx:   Feeding: cardiac  Analgesia/Sedation:none / PRN oxy,Fentanyl  Thromboembolic prevention: SCDs, heparin gtt, warfarin   HOB >30: Yes  Stress Ulcer ppx: famotidine  Glucose control: Critical care goal 140-180 g/dl, ISS     Lines/Drains/Airway: R PICC, L brachial A-line; LVAD      Dispo/Code Status/Palliative:   -- SICU / Full Code.

## 2022-07-07 NOTE — ASSESSMENT & PLAN NOTE
Titrate insulin slowly to avoid hypoglycemia as the risk of hypoglycemia increases with decreased creatinine clearance.    Estimated Creatinine Clearance: 93 mL/min (based on SCr of 1.3 mg/dL).

## 2022-07-08 LAB
ALBUMIN SERPL BCP-MCNC: 3.2 G/DL (ref 3.5–5.2)
ALLENS TEST: ABNORMAL
ALP SERPL-CCNC: 161 U/L (ref 55–135)
ALT SERPL W/O P-5'-P-CCNC: 157 U/L (ref 10–44)
ANION GAP SERPL CALC-SCNC: 16 MMOL/L (ref 8–16)
APTT BLDCRRT: 35.3 SEC (ref 21–32)
AST SERPL-CCNC: 59 U/L (ref 10–40)
BASOPHILS # BLD AUTO: 0.04 K/UL (ref 0–0.2)
BASOPHILS NFR BLD: 0.2 % (ref 0–1.9)
BILIRUB SERPL-MCNC: 1.5 MG/DL (ref 0.1–1)
BNP SERPL-MCNC: 677 PG/ML (ref 0–99)
BUN SERPL-MCNC: 38 MG/DL (ref 6–20)
CALCIUM SERPL-MCNC: 9.3 MG/DL (ref 8.7–10.5)
CHLORIDE SERPL-SCNC: 88 MMOL/L (ref 95–110)
CO2 SERPL-SCNC: 27 MMOL/L (ref 23–29)
CREAT SERPL-MCNC: 1.5 MG/DL (ref 0.5–1.4)
CRP SERPL-MCNC: 130.6 MG/L (ref 0–8.2)
DIFFERENTIAL METHOD: ABNORMAL
EOSINOPHIL # BLD AUTO: 0.1 K/UL (ref 0–0.5)
EOSINOPHIL NFR BLD: 0.7 % (ref 0–8)
ERYTHROCYTE [DISTWIDTH] IN BLOOD BY AUTOMATED COUNT: 16 % (ref 11.5–14.5)
EST. GFR  (AFRICAN AMERICAN): >60 ML/MIN/1.73 M^2
EST. GFR  (NON AFRICAN AMERICAN): 58.6 ML/MIN/1.73 M^2
FIBRINOGEN PPP-MCNC: >800 MG/DL (ref 182–400)
GLUCOSE SERPL-MCNC: 141 MG/DL (ref 70–110)
HCO3 UR-SCNC: 31.5 MMOL/L (ref 24–28)
HCT VFR BLD AUTO: 22.6 % (ref 40–54)
HGB BLD-MCNC: 7.1 G/DL (ref 14–18)
IMM GRANULOCYTES # BLD AUTO: 0.09 K/UL (ref 0–0.04)
IMM GRANULOCYTES NFR BLD AUTO: 0.5 % (ref 0–0.5)
INR PPP: 2.8 (ref 0.8–1.2)
LDH SERPL L TO P-CCNC: 501 U/L (ref 110–260)
LYMPHOCYTES # BLD AUTO: 2 K/UL (ref 1–4.8)
LYMPHOCYTES NFR BLD: 11.5 % (ref 18–48)
MAGNESIUM SERPL-MCNC: 2.2 MG/DL (ref 1.6–2.6)
MAGNESIUM SERPL-MCNC: 2.3 MG/DL (ref 1.6–2.6)
MCH RBC QN AUTO: 27.5 PG (ref 27–31)
MCHC RBC AUTO-ENTMCNC: 31.4 G/DL (ref 32–36)
MCV RBC AUTO: 88 FL (ref 82–98)
METHEMOGLOBIN: 3.2 % (ref 0–3)
MONOCYTES # BLD AUTO: 1.5 K/UL (ref 0.3–1)
MONOCYTES NFR BLD: 8.7 % (ref 4–15)
NEUTROPHILS # BLD AUTO: 13.6 K/UL (ref 1.8–7.7)
NEUTROPHILS NFR BLD: 78.4 % (ref 38–73)
NRBC BLD-RTO: 1 /100 WBC
PCO2 BLDA: 52.4 MMHG (ref 35–45)
PH SMN: 7.39 [PH] (ref 7.35–7.45)
PHOSPHATE SERPL-MCNC: 3.4 MG/DL (ref 2.7–4.5)
PLATELET # BLD AUTO: 348 K/UL (ref 150–450)
PMV BLD AUTO: 10.3 FL (ref 9.2–12.9)
PO2 BLDA: 29 MMHG (ref 40–60)
POC BE: 7 MMOL/L
POC SATURATED O2: 52 % (ref 95–100)
POC TCO2: 33 MMOL/L (ref 24–29)
POCT GLUCOSE: 117 MG/DL (ref 70–110)
POCT GLUCOSE: 146 MG/DL (ref 70–110)
POCT GLUCOSE: 148 MG/DL (ref 70–110)
POCT GLUCOSE: 200 MG/DL (ref 70–110)
POCT GLUCOSE: 272 MG/DL (ref 70–110)
POTASSIUM SERPL-SCNC: 3.9 MMOL/L (ref 3.5–5.1)
POTASSIUM SERPL-SCNC: 4 MMOL/L (ref 3.5–5.1)
POTASSIUM SERPL-SCNC: 4 MMOL/L (ref 3.5–5.1)
POTASSIUM SERPL-SCNC: 4.1 MMOL/L (ref 3.5–5.1)
POTASSIUM SERPL-SCNC: 4.3 MMOL/L (ref 3.5–5.1)
PROT SERPL-MCNC: 7 G/DL (ref 6–8.4)
PROTHROMBIN TIME: 27.7 SEC (ref 9–12.5)
RBC # BLD AUTO: 2.58 M/UL (ref 4.6–6.2)
SAMPLE: ABNORMAL
SITE: ABNORMAL
SODIUM SERPL-SCNC: 131 MMOL/L (ref 136–145)
WBC # BLD AUTO: 17.3 K/UL (ref 3.9–12.7)

## 2022-07-08 PROCEDURE — 63600367 HC NITRIC OXIDE PER HOUR

## 2022-07-08 PROCEDURE — 25000003 PHARM REV CODE 250

## 2022-07-08 PROCEDURE — 83735 ASSAY OF MAGNESIUM: CPT | Mod: 91

## 2022-07-08 PROCEDURE — 85025 COMPLETE CBC W/AUTO DIFF WBC: CPT | Performed by: STUDENT IN AN ORGANIZED HEALTH CARE EDUCATION/TRAINING PROGRAM

## 2022-07-08 PROCEDURE — 84132 ASSAY OF SERUM POTASSIUM: CPT | Mod: 91

## 2022-07-08 PROCEDURE — 99232 SBSQ HOSP IP/OBS MODERATE 35: CPT | Mod: ,,, | Performed by: NURSE PRACTITIONER

## 2022-07-08 PROCEDURE — 83880 ASSAY OF NATRIURETIC PEPTIDE: CPT | Performed by: STUDENT IN AN ORGANIZED HEALTH CARE EDUCATION/TRAINING PROGRAM

## 2022-07-08 PROCEDURE — 83615 LACTATE (LD) (LDH) ENZYME: CPT | Performed by: THORACIC SURGERY (CARDIOTHORACIC VASCULAR SURGERY)

## 2022-07-08 PROCEDURE — 99291 CRITICAL CARE FIRST HOUR: CPT | Mod: ,,, | Performed by: PHYSICIAN ASSISTANT

## 2022-07-08 PROCEDURE — 85384 FIBRINOGEN ACTIVITY: CPT | Performed by: STUDENT IN AN ORGANIZED HEALTH CARE EDUCATION/TRAINING PROGRAM

## 2022-07-08 PROCEDURE — 85730 THROMBOPLASTIN TIME PARTIAL: CPT | Performed by: STUDENT IN AN ORGANIZED HEALTH CARE EDUCATION/TRAINING PROGRAM

## 2022-07-08 PROCEDURE — 94761 N-INVAS EAR/PLS OXIMETRY MLT: CPT

## 2022-07-08 PROCEDURE — 80053 COMPREHEN METABOLIC PANEL: CPT | Performed by: THORACIC SURGERY (CARDIOTHORACIC VASCULAR SURGERY)

## 2022-07-08 PROCEDURE — 99291 CRITICAL CARE FIRST HOUR: CPT | Mod: ,,, | Performed by: ANESTHESIOLOGY

## 2022-07-08 PROCEDURE — 25000003 PHARM REV CODE 250: Performed by: STUDENT IN AN ORGANIZED HEALTH CARE EDUCATION/TRAINING PROGRAM

## 2022-07-08 PROCEDURE — 94799 UNLISTED PULMONARY SVC/PX: CPT

## 2022-07-08 PROCEDURE — 99232 PR SUBSEQUENT HOSPITAL CARE,LEVL II: ICD-10-PCS | Mod: ,,, | Performed by: NURSE PRACTITIONER

## 2022-07-08 PROCEDURE — 20000000 HC ICU ROOM

## 2022-07-08 PROCEDURE — 27000248 HC VAD-ADDITIONAL DAY

## 2022-07-08 PROCEDURE — 99900035 HC TECH TIME PER 15 MIN (STAT)

## 2022-07-08 PROCEDURE — 25000003 PHARM REV CODE 250: Performed by: THORACIC SURGERY (CARDIOTHORACIC VASCULAR SURGERY)

## 2022-07-08 PROCEDURE — 63600175 PHARM REV CODE 636 W HCPCS: Performed by: STUDENT IN AN ORGANIZED HEALTH CARE EDUCATION/TRAINING PROGRAM

## 2022-07-08 PROCEDURE — 25000003 PHARM REV CODE 250: Performed by: ANESTHESIOLOGY

## 2022-07-08 PROCEDURE — 99292 CRITICAL CARE ADDL 30 MIN: CPT | Mod: ,,, | Performed by: INTERNAL MEDICINE

## 2022-07-08 PROCEDURE — 27000221 HC OXYGEN, UP TO 24 HOURS

## 2022-07-08 PROCEDURE — 63600175 PHARM REV CODE 636 W HCPCS

## 2022-07-08 PROCEDURE — 86140 C-REACTIVE PROTEIN: CPT | Performed by: STUDENT IN AN ORGANIZED HEALTH CARE EDUCATION/TRAINING PROGRAM

## 2022-07-08 PROCEDURE — 84100 ASSAY OF PHOSPHORUS: CPT | Performed by: THORACIC SURGERY (CARDIOTHORACIC VASCULAR SURGERY)

## 2022-07-08 PROCEDURE — 99291 PR CRITICAL CARE, E/M 30-74 MINUTES: ICD-10-PCS | Mod: ,,, | Performed by: ANESTHESIOLOGY

## 2022-07-08 PROCEDURE — 97530 THERAPEUTIC ACTIVITIES: CPT

## 2022-07-08 PROCEDURE — 25000003 PHARM REV CODE 250: Performed by: NURSE PRACTITIONER

## 2022-07-08 PROCEDURE — A4216 STERILE WATER/SALINE, 10 ML: HCPCS | Performed by: THORACIC SURGERY (CARDIOTHORACIC VASCULAR SURGERY)

## 2022-07-08 PROCEDURE — 83735 ASSAY OF MAGNESIUM: CPT

## 2022-07-08 PROCEDURE — 99291 PR CRITICAL CARE, E/M 30-74 MINUTES: ICD-10-PCS | Mod: ,,, | Performed by: PHYSICIAN ASSISTANT

## 2022-07-08 PROCEDURE — 99292 PR CRITICAL CARE, ADDL 30 MIN: ICD-10-PCS | Mod: ,,, | Performed by: INTERNAL MEDICINE

## 2022-07-08 PROCEDURE — 85610 PROTHROMBIN TIME: CPT | Performed by: STUDENT IN AN ORGANIZED HEALTH CARE EDUCATION/TRAINING PROGRAM

## 2022-07-08 RX ORDER — GABAPENTIN 100 MG/1
100 CAPSULE ORAL 3 TIMES DAILY
Status: DISCONTINUED | OUTPATIENT
Start: 2022-07-08 | End: 2022-07-28 | Stop reason: HOSPADM

## 2022-07-08 RX ORDER — PANTOPRAZOLE SODIUM 40 MG/1
40 TABLET, DELAYED RELEASE ORAL DAILY
Status: DISCONTINUED | OUTPATIENT
Start: 2022-07-08 | End: 2022-07-12

## 2022-07-08 RX ORDER — POLYETHYLENE GLYCOL 3350 17 G/17G
17 POWDER, FOR SOLUTION ORAL DAILY
Status: DISCONTINUED | OUTPATIENT
Start: 2022-07-09 | End: 2022-07-28 | Stop reason: HOSPADM

## 2022-07-08 RX ORDER — ACETAMINOPHEN 325 MG/1
650 TABLET ORAL EVERY 6 HOURS
Status: DISCONTINUED | OUTPATIENT
Start: 2022-07-08 | End: 2022-07-10

## 2022-07-08 RX ORDER — WARFARIN 4 MG/1
4 TABLET ORAL DAILY
Status: DISCONTINUED | OUTPATIENT
Start: 2022-07-08 | End: 2022-07-09

## 2022-07-08 RX ORDER — GABAPENTIN 100 MG/1
200 CAPSULE ORAL 3 TIMES DAILY
Status: DISCONTINUED | OUTPATIENT
Start: 2022-07-08 | End: 2022-07-08

## 2022-07-08 RX ORDER — GABAPENTIN 300 MG/1
300 CAPSULE ORAL 3 TIMES DAILY
Status: DISCONTINUED | OUTPATIENT
Start: 2022-07-08 | End: 2022-07-08

## 2022-07-08 RX ADMIN — Medication 10 ML: at 06:07

## 2022-07-08 RX ADMIN — ACETAMINOPHEN 650 MG: 325 TABLET ORAL at 05:07

## 2022-07-08 RX ADMIN — PANTOPRAZOLE SODIUM 40 MG: 40 TABLET, DELAYED RELEASE ORAL at 08:07

## 2022-07-08 RX ADMIN — Medication 10 ML: at 11:07

## 2022-07-08 RX ADMIN — WARFARIN SODIUM 4 MG: 4 TABLET ORAL at 05:07

## 2022-07-08 RX ADMIN — INSULIN ASPART 14 UNITS: 100 INJECTION, SOLUTION INTRAVENOUS; SUBCUTANEOUS at 11:07

## 2022-07-08 RX ADMIN — SODIUM CHLORIDE 1 G: 1 TABLET ORAL at 10:07

## 2022-07-08 RX ADMIN — TRAMADOL HYDROCHLORIDE 50 MG: 50 TABLET, COATED ORAL at 03:07

## 2022-07-08 RX ADMIN — INSULIN ASPART 14 UNITS: 100 INJECTION, SOLUTION INTRAVENOUS; SUBCUTANEOUS at 07:07

## 2022-07-08 RX ADMIN — HYDROMORPHONE HYDROCHLORIDE 0.2 MG: 1 INJECTION, SOLUTION INTRAMUSCULAR; INTRAVENOUS; SUBCUTANEOUS at 10:07

## 2022-07-08 RX ADMIN — GABAPENTIN 300 MG: 300 CAPSULE ORAL at 12:07

## 2022-07-08 RX ADMIN — INSULIN ASPART 14 UNITS: 100 INJECTION, SOLUTION INTRAVENOUS; SUBCUTANEOUS at 04:07

## 2022-07-08 RX ADMIN — TIZANIDINE 2 MG: 2 TABLET ORAL at 01:07

## 2022-07-08 RX ADMIN — TIZANIDINE 2 MG: 2 TABLET ORAL at 05:07

## 2022-07-08 RX ADMIN — GABAPENTIN 100 MG: 100 CAPSULE ORAL at 03:07

## 2022-07-08 RX ADMIN — FUROSEMIDE 20 MG/HR: 10 INJECTION, SOLUTION INTRAMUSCULAR; INTRAVENOUS at 02:07

## 2022-07-08 RX ADMIN — AMLODIPINE BESYLATE 10 MG: 10 TABLET ORAL at 08:07

## 2022-07-08 RX ADMIN — POTASSIUM BICARBONATE 50 MEQ: 978 TABLET, EFFERVESCENT ORAL at 09:07

## 2022-07-08 RX ADMIN — Medication 10 ML: at 05:07

## 2022-07-08 RX ADMIN — Medication 324 MG: at 08:07

## 2022-07-08 RX ADMIN — INSULIN ASPART 4 UNITS: 100 INJECTION, SOLUTION INTRAVENOUS; SUBCUTANEOUS at 11:07

## 2022-07-08 RX ADMIN — GABAPENTIN 100 MG: 100 CAPSULE ORAL at 09:07

## 2022-07-08 RX ADMIN — INSULIN HUMAN 1 UNITS/HR: 1 INJECTION, SOLUTION INTRAVENOUS at 09:07

## 2022-07-08 RX ADMIN — TRAMADOL HYDROCHLORIDE 50 MG: 50 TABLET, COATED ORAL at 09:07

## 2022-07-08 RX ADMIN — POTASSIUM BICARBONATE 50 MEQ: 978 TABLET, EFFERVESCENT ORAL at 08:07

## 2022-07-08 RX ADMIN — HYDROMORPHONE HYDROCHLORIDE 0.2 MG: 1 INJECTION, SOLUTION INTRAMUSCULAR; INTRAVENOUS; SUBCUTANEOUS at 06:07

## 2022-07-08 RX ADMIN — Medication 10 ML: at 12:07

## 2022-07-08 RX ADMIN — HYDROMORPHONE HYDROCHLORIDE 0.2 MG: 1 INJECTION, SOLUTION INTRAMUSCULAR; INTRAVENOUS; SUBCUTANEOUS at 05:07

## 2022-07-08 RX ADMIN — INSULIN ASPART 2 UNITS: 100 INJECTION, SOLUTION INTRAVENOUS; SUBCUTANEOUS at 09:07

## 2022-07-08 RX ADMIN — SODIUM CHLORIDE 1 G: 1 TABLET ORAL at 08:07

## 2022-07-08 RX ADMIN — POLYETHYLENE GLYCOL 3350 17 G: 17 POWDER, FOR SOLUTION ORAL at 08:07

## 2022-07-08 RX ADMIN — FUROSEMIDE 7.5 MG/HR: 10 INJECTION, SOLUTION INTRAMUSCULAR; INTRAVENOUS at 03:07

## 2022-07-08 RX ADMIN — TIZANIDINE 2 MG: 2 TABLET ORAL at 09:07

## 2022-07-08 RX ADMIN — ACETAMINOPHEN 650 MG: 325 TABLET ORAL at 11:07

## 2022-07-08 NOTE — ASSESSMENT & PLAN NOTE
-S/P DT HM3 with MVR plus Protek Duo for RV support 6/29 (Grecia)  -Rvad removed with concern for hemolysis, Marcella increased to 20,  -Post op antibiotic per CTS (h/o Staph epi bacteremia, cleareed by ID for surgery with rec to continue antibiotic coverage for 2 weeks post op)  - GASTON trial   -Speed set at 5300 (increased to 5300 on 7/6)   -Echo done 7/5 EF: 10%, LVEDD: 7.13 with speed at 5200.  Would repeat echo in couple days after speed change   -Would increase lasix drip (+ adding metolazone).  - off.  Would consider Primacor  if needed for inotropic support   -INR is 2.8 today (goal 2-3). With rapid increase, would recommend decreasing warfarin dose by 50%.    Procedure: Device Interrogation Including analysis of device parameters  Current Settings: Ventricular Assist Device  Review of device function is stable    TXP LVAD INTERROGATIONS 7/8/2022 7/8/2022 7/8/2022 7/8/2022 7/8/2022 7/8/2022 7/8/2022   Type HeartMate3 HeartMate3 HeartMate3 HeartMate3 HeartMate3 HeartMate3 HeartMate3   Flow 4.3 4.6 4.7 4.6 4.5 4.7 4.7   Speed 5300 5300 5300 5300 5300 5300 5300   PI 4 3.8 3.7 3.5 4.1 3.3 3.2   Power (Serna) 3.8 3.8 3.8 3.8 3.9 3.8 3.8   LSL 4900 4900 4900 4900 4900 4900 4900   Pulsatility - - Intermittent pulse - - - Intermittent pulse

## 2022-07-08 NOTE — ASSESSMENT & PLAN NOTE
-Blood cultures 6/20 and repeat 6/21 positive for Staph Epi. One of two blood cultures from 6/23 positive for Staph (taken prior to line exchange). Repeat cultures sent 6/25 NGTD.   -IJ exchanged on 6/23, IABP exchanged 6/23  -ID recommended 14 day course of vancomycin from VAD implantation on 6/29 with end date: 7/12/22. Vancomycin discontinued per CTS with concerns for elevated sCr. ID with new recs to complete 7d course of daptomycin, which was completed 7/7/22.

## 2022-07-08 NOTE — ASSESSMENT & PLAN NOTE
-hypervolemic  -Improvement in Na with diuresis, recommend continued diuresis and discontinuation of salt tabs in setting of volume overload

## 2022-07-08 NOTE — SUBJECTIVE & OBJECTIVE
**Interval History: Net negative 2.5L/24h on lasix gtt at 20mg/hr and epi .05. Remains volume overloaded however Na improved with above diuresis. Last recorded CVP 23 prior to getting up in chair this AM before shift change. CO 7.4 CI 3.3 on epi .05, weaning slowly. Marcella weaned to off this AM. Recommend continued diuresis and to discontinue salt tablets. INR 2.8 from 2.0 yesterday, would recommend cutting warfarin dose by 50%.      Continuous Infusions:   sodium chloride 0.9% 5 mL/hr at 06/27/22 0055    sodium chloride 0.9% Stopped (07/05/22 1314)    EPINEPHrine 0.04 mcg/kg/min (07/08/22 1300)    furosemide (LASIX) 2 mg/mL continuous infusion (non-titrating) 20 mg/hr (07/08/22 1300)    insulin regular 1 units/mL infusion orderable (TRANSFER) 1 Units/hr (07/08/22 1300)    nicardipine Stopped (07/05/22 2204)    nitric oxide gas       Scheduled Meds:   amLODIPine  10 mg Oral Daily    ferrous gluconate  324 mg Oral Daily with breakfast    gabapentin  300 mg Oral TID    insulin aspart U-100  14 Units Subcutaneous TIDWM    INV aspirin/placebo  100 mg Oral Daily    LIDOcaine  1 patch Transdermal Q24H    pantoprazole  40 mg Oral Daily    [START ON 7/9/2022] polyethylene glycol  17 g Oral Daily    potassium bicarbonate  50 mEq Oral BID    sodium chloride 0.9%  10 mL Intravenous Q6H    sodium chloride  1 g Oral BID    tiZANidine  2 mg Oral Q8H    warfarin  6 mg Oral Daily     PRN Meds:albumin human 5%, albuterol sulfate, bisacodyL, dextrose 10%, dextrose 10%, glucagon (human recombinant), glucose, glucose, hydrALAZINE, HYDROmorphone, insulin aspart U-100, magnesium hydroxide 400 mg/5 ml, magnesium sulfate IVPB, potassium bicarbonate, potassium bicarbonate, potassium bicarbonate, sodium chloride 0.9%, sodium chloride 0.9%, Flushing PICC Protocol **AND** sodium chloride 0.9% **AND** sodium chloride 0.9%, traMADoL    Review of patient's allergies indicates:   Allergen Reactions    Aspirin Other (See Comments)     Mr. Thacker is  enrolled in Dr. Paige's Alon Trial and cannot have any aspirin and/or aspirin-containing products. DO NOT cancel any orders for INV Aspirin 100 mg/Placebo. If you have questions, please contact Isabel @ [phone], [phone],[email or web address], secure chat or MS Teams message.    Bumex [bumetanide] Hives    Lactose Diarrhea     Other reaction(s): Abdominal distension, gaseous    Torsemide Hives     Objective:     Vital Signs (Most Recent):  Temp: 98.3 °F (36.8 °C) (07/08/22 1105)  Pulse: 106 (07/08/22 1215)  Resp: 19 (07/08/22 1215)  BP: (!) 88/0 (07/08/22 1105)  SpO2: 100 % (07/08/22 1105)   Vital Signs (24h Range):  Temp:  [98 °F (36.7 °C)-98.7 °F (37.1 °C)] 98.3 °F (36.8 °C)  Pulse:  [] 106  Resp:  [7-61] 19  SpO2:  [95 %-100 %] 100 %  BP: (78-88)/(0) 88/0  Arterial Line BP: ()/() 86/75     Patient Vitals for the past 72 hrs (Last 3 readings):   Weight   07/08/22 1200 97.5 kg (214 lb 15.2 oz)   07/07/22 0500 97.5 kg (214 lb 15.2 oz)   07/05/22 1358 93.7 kg (206 lb 9.1 oz)       Body mass index is 26.87 kg/m².      Intake/Output Summary (Last 24 hours) at 7/8/2022 1347  Last data filed at 7/8/2022 1300  Gross per 24 hour   Intake 1365.01 ml   Output 3625 ml   Net -2259.99 ml         Hemodynamic Parameters:       Telemetry: ST    Physical Exam  Vitals and nursing note reviewed.   Constitutional:       Appearance: Normal appearance.      Comments: Sitting in chair NAD   HENT:      Head: Normocephalic and atraumatic.   Eyes:      Extraocular Movements: Extraocular movements intact.      Conjunctiva/sclera: Conjunctivae normal.   Neck:      Comments: JVD elevation to jawline   Cardiovascular:      Rate and Rhythm: Regular rhythm. Tachycardia present.      Comments: Smooth VAD hum  Pulmonary:      Breath sounds: Normal breath sounds.   Abdominal:      Palpations: Abdomen is soft.   Musculoskeletal:         General: Swelling present.      Cervical back: Neck supple.      Comments: Bilateral pedal  edema   Skin:     General: Skin is dry.      Capillary Refill: Capillary refill takes 2 to 3 seconds.   Neurological:      Mental Status: He is alert.       Significant Labs:  CBC:  Recent Labs   Lab 07/06/22 0402 07/07/22 0319 07/08/22  0408   WBC 21.55* 21.04* 17.30*   RBC 2.56* 2.58* 2.58*   HGB 7.2* 7.1* 7.1*   HCT 22.6* 22.8* 22.6*    302 348   MCV 88 88 88   MCH 28.1 27.5 27.5   MCHC 31.9* 31.1* 31.4*       BNP:  Recent Labs   Lab 07/04/22  0301 07/06/22  0402 07/08/22  0408   * 955* 677*       CMP:  Recent Labs   Lab 07/06/22 0402 07/06/22  1000 07/07/22  0319 07/07/22  1607 07/07/22  2215 07/08/22  0408 07/08/22  1005   *  --  139*  --   --  141*  --    CALCIUM 9.0  --  8.7  --   --  9.3  --    ALBUMIN 3.2*  --  3.2*  --   --  3.2*  --    PROT 6.9  --  6.8  --   --  7.0  --    *  --  128*  --   --  131*  --    K 4.9  4.8   < > 3.6  3.6  3.6   < > 4.6 3.9  4.0 4.3   CO2 28  --  28  --   --  27  --    CL 86*  --  88*  --   --  88*  --    BUN 40*  --  34*  --   --  38*  --    CREATININE 1.6*  --  1.3  --   --  1.5*  --    ALKPHOS 147*  --  146*  --   --  161*  --    *  --  204*  --   --  157*  --    *  --  71*  --   --  59*  --    BILITOT 1.9*  --  1.5*  --   --  1.5*  --     < > = values in this interval not displayed.        Coagulation:   Recent Labs   Lab 07/06/22 0402 07/06/22  1149 07/07/22  0008 07/07/22  0524 07/08/22  0408   INR 1.8*  --   --  2.0* 2.8*   APTT 54.3*   < > 51.0* 48.2* 35.3*    < > = values in this interval not displayed.       LDH:  Recent Labs   Lab 07/06/22  0402 07/07/22  0319 07/08/22  0408   * 544* 501*       Microbiology:  Microbiology Results (last 7 days)       Procedure Component Value Units Date/Time    Blood culture [[id number]] Collected: 07/02/22 1629    Order Status: Completed Specimen: Blood from Peripheral, Hand, Right Updated: 07/07/22 1812     Blood Culture, Routine No growth after 5 days.    Blood culture  [[id number]] Collected: 07/02/22 1618    Order Status: Completed Specimen: Blood from Peripheral, Antecubital, Right Updated: 07/07/22 1812     Blood Culture, Routine No growth after 5 days.    Blood culture [[id number]] Collected: 06/30/22 1836    Order Status: Completed Specimen: Blood from Peripheral, Wrist, Left Updated: 07/05/22 2012     Blood Culture, Routine No growth after 5 days.    Blood culture [[id number]] Collected: 06/30/22 1833    Order Status: Completed Specimen: Blood from Peripheral, Forearm, Right Updated: 07/05/22 2012     Blood Culture, Routine No growth after 5 days.            I have reviewed all pertinent labs within the past 24 hours.    Estimated Creatinine Clearance: 80.6 mL/min (A) (based on SCr of 1.5 mg/dL (H)).    Diagnostic Results:  I have reviewed and interpreted all pertinent imaging results/findings within the past 24 hours.

## 2022-07-08 NOTE — PROGRESS NOTES
"[facility] - [facility] Intensive Care  Endocrinology  Progress Note    Admit Date: 2022     Reason for Consult: Management of  type 2 DM, Hyperglycemia     Surgical Procedure and Date: none    Diabetes diagnosis year: 21    Home Diabetes Medications:  Detemir  23  units daily and Aspart   12  units TIDWM and  Mod dose correction insulin    Lab Results   Component Value Date    HGBA1C 9.2 (H) 2022           How often checking glucose at home? 1-3 x day   BG readings on regimen: 180- up to 300 once  Hypoglycemia on the regimen?  yes once- related to not really eating after taking aspart   Missed doses on regimen?  no    Diabetes Complications include:     Hyperglycemia    Complicating diabetes co morbidities:   History of MI, CHF, and CKD      HPI:   Patient is a 37 y.o. male with a diagnosis of type 2 DM and NIDCM with BiV systolic heart failure. Patient was presented at [facility] 22  and was  approved for VAD. Also recently admitted with [facility]; he had clinic appointment last week to f/u recent admit for hyperglycemic hyperosmolar syndrome but did not come as he was "feeling too bad" presents to  ED with SOB. Endocrinology consulted for BG/ DM management.                Interval HPI:   Overnight events: No acute events overnight. Patient on the SICU in room 93366 CVICU/28744 CVICU A. Blood glucose  variable . BG at, above, and below goal on current insulin regimen (Transition Insulin Drip). Steroid use- None. 8 Days Post-Op  Renal function- Abnormal - Creatinine 1.5   Vasopressors-  Epinephrine       D[facility]; Consistent Carbohydrate; Isolation Tray - Regular [place]; Fluid - 1500mL     Eatin%  Nausea: No  Hypoglycemia and intervention: No  Fever: No  TPN and/or TF: No      BP (!) 88/0 (BP Location: Left arm, Patient Position: Lying)   Pulse 97   Temp 98.3 °F (36.8 °C)   Resp (!) 22   Ht 6' 3" (1.905 m)   Wt 97.5 kg (214 lb 15.2 oz)   SpO2 100%   BMI 26.87 kg/m² "     Labs Reviewed and Include    Recent Labs   Lab 07/08/22  0408 07/08/22  1005   *  --    CALCIUM 9.3  --    ALBUMIN 3.2*  --    PROT 7.0  --    *  --    K 3.9  4.0 4.3   CO2 27  --    CL 88*  --    BUN 38*  --    CREATININE 1.5*  --    ALKPHOS 161*  --    *  --    AST 59*  --    BILITOT 1.5*  --      Lab Results   Component Value Date    WBC 17.30 (H) 07/08/2022    HGB 7.1 (L) 07/08/2022    HCT 22.6 (L) 07/08/2022    MCV 88 07/08/2022     07/08/2022     No results for input(s): TSH, FREET4 in the last 168 hours.  Lab Results   Component Value Date    HGBA1C 9.2 (H) 05/20/2022       Nutritional status:   Body mass index is 26.87 kg/m².  Lab Results   Component Value Date    ALBUMIN 3.2 (L) 07/08/2022    ALBUMIN 3.2 (L) 07/07/2022    ALBUMIN 3.2 (L) 07/06/2022     Lab Results   Component Value Date    PREALBUMIN 12 (L) 07/07/2022    PREALBUMIN 19 (L) 06/30/2022    PREALBUMIN 36 04/18/2022       Estimated Creatinine Clearance: 80.6 mL/min (A) (based on SCr of 1.5 mg/dL (H)).    Accu-Checks  Recent Labs     07/06/22  1649 07/06/22  2147 07/07/22  0155 07/07/22  0745 07/07/22  1106 07/07/22  1607 07/07/22  2215 07/08/22  0134 07/08/22  0737 07/08/22  1117   POCTGLUCOSE 204* 169* 128* 189* 154* 104 162* 148* 146* 272*       Current Medications and/or Treatments Impacting Glycemic Control  Immunotherapy:    Immunosuppressants       None          Steroids:   Hormones (From admission, onward)                None          Pressors:    Autonomic Drugs (From admission, onward)                Start     Stop Route Frequency Ordered    07/06/22 1000  EPINEPHrine (ADRENALIN) 10 mg in dextrose 5 % 250 mL infusion         -- IV Continuous 07/06/22 0953          Hyperglycemia/Diabetes Medications:   Antihyperglycemics (From admission, onward)                Start     Stop Route Frequency Ordered    07/08/22 1200  insulin regular in 0.9 % NaCl 100 unit/100 mL (1 unit/mL) infusion        Question:  Enter  initial dose (Units/hr):  Answer:  1    -- IV Continuous 07/08/22 1157    07/07/22 1130  insulin aspart U-100 pen 14 Units         -- SubQ 3 times daily with meals 07/07/22 0854    07/01/22 1833  insulin aspart U-100 pen 0-10 Units         -- SubQ As needed (PRN) 07/01/22 1733            ASSESSMENT and PLAN    * LVAD (left ventricular assist device) present  Managed per primary team  Avoid hypoglycemia        Uncontrolled diabetes mellitus  Endocrinology consulted for BG management.   BG goal 140-180    - Transition insulin infusion at 1 u/hr with step-down parameters.   - Novolog 14 units TIDWM and prn for BG excursions [facility] (150/25) SSI.  - BG monitoring ac/hs/0200 and moderate dose correction scale.   - Hypoglycemia protocol in place.     ** Please notify Endocrine for any change and/or advance in diet**  ** Please call Endocrine for any BG related issues **    Discharge Planning:   TBD. Please notify endocrinology prior to discharge.        Acute on chronic combined systolic and diastolic congestive heart failure, NYHA class 4  Optimize glucose control and  avoid hypoglycemia    Managed per primary.       CKD (chronic kidney disease) stage 3, GFR 30-59 ml/min    Titrate insulin slowly to avoid hypoglycemia as the risk of hypoglycemia increases with decreased creatinine clearance.    Estimated Creatinine Clearance: 80.6 mL/min (A) (based on SCr of 1.5 mg/dL (H)).      Surgical wound present  Optimize BG for surgical wound healing.              Michael Wyman, DNP, FNP  Endocrinology  [facility] - [facility]

## 2022-07-08 NOTE — SUBJECTIVE & OBJECTIVE
"Interval HPI:   Overnight events: No acute events overnight. Patient on the SICU in room 21173 CVICU/69964 CVICU A. Blood glucose  variable . BG at, above, and below goal on current insulin regimen (Transition Insulin Drip). Steroid use- None. 8 Days Post-Op  Renal function- Abnormal - Creatinine 1.5   Vasopressors-  Epinephrine       Diet [facility]; Consistent Carbohydrate; Isolation Tray - Regular [place]; Fluid - 1500mL     Eatin%  Nausea: No  Hypoglycemia and intervention: No  Fever: No  TPN and/or TF: No      BP (!) 88/0 (BP Location: Left arm, Patient Position: Lying)   Pulse 97   Temp 98.3 °F (36.8 °C)   Resp (!) 22   Ht 6' 3" (1.905 m)   Wt 97.5 kg (214 lb 15.2 oz)   SpO2 100%   BMI 26.87 kg/m²     Labs Reviewed and Include    Recent Labs   Lab 22  0408 22  1005   *  --    CALCIUM 9.3  --    ALBUMIN 3.2*  --    PROT 7.0  --    *  --    K 3.9  4.0 4.3   CO2 27  --    CL 88*  --    BUN 38*  --    CREATININE 1.5*  --    ALKPHOS 161*  --    *  --    AST 59*  --    BILITOT 1.5*  --      Lab Results   Component Value Date    WBC 17.30 (H) 2022    HGB 7.1 (L) 2022    HCT 22.6 (L) 2022    MCV 88 2022     2022     No results for input(s): TSH, FREET4 in the last 168 hours.  Lab Results   Component Value Date    HGBA1C 9.2 (H) 2022       Nutritional status:   Body mass index is 26.87 kg/m².  Lab Results   Component Value Date    ALBUMIN 3.2 (L) 2022    ALBUMIN 3.2 (L) 2022    ALBUMIN 3.2 (L) 2022     Lab Results   Component Value Date    PREALBUMIN 12 (L) 2022    PREALBUMIN 19 (L) 2022    PREALBUMIN 36 2022       Estimated Creatinine Clearance: 80.6 mL/min (A) (based on SCr of 1.5 mg/dL (H)).    Accu-Checks  Recent Labs     22  1649 22  2147 22  0155 22  0745 22  1106 22  1607 22  2215 22  0134 22  0737 22  1117 "   POCTGLUCOSE 204* 169* 128* 189* 154* 104 162* 148* 146* 272*       Current Medications and/or Treatments Impacting Glycemic Control  Immunotherapy:    Immunosuppressants       None          Steroids:   Hormones (From admission, onward)                None          Pressors:    Autonomic Drugs (From admission, onward)                Start     Stop Route Frequency Ordered    07/06/22 1000  EPINEPHrine (ADRENALIN) 10 mg in dextrose 5 % 250 mL infusion         -- IV Continuous 07/06/22 0953          Hyperglycemia/Diabetes Medications:   Antihyperglycemics (From admission, onward)                Start     Stop Route Frequency Ordered    07/08/22 1200  insulin regular in 0.9 % NaCl 100 unit/100 mL (1 unit/mL) infusion        Question:  Enter initial dose (Units/hr):  Answer:  1    -- IV Continuous 07/08/22 1157    07/07/22 1130  insulin aspart U-100 pen 14 Units         -- SubQ 3 times daily with meals 07/07/22 0854    07/01/22 1833  insulin aspart U-100 pen 0-10 Units         -- SubQ As needed (PRN) 07/01/22 0683

## 2022-07-08 NOTE — PLAN OF CARE
Significant events: Nitric oxide D/C. Epinephrine weaned to 0.03 mcg/kg/min. OOBTC ~7 hrs. Pain to bilateral ankles, patient refused PT/OT. Gabapentin and tylenol started.   SVO2 52%.   Vitals/Respiratory:  3L NC.  O2: >95%.   HR: 's, sinus/sinus tachycardia.  MAP: 75-85.  CVP: 23.  Temperature max:  98.5 F.   Gtts:  Epinephrine at 0.03 mcg/kg/min, Lasix at 7.5 mg/hr, and Insulin.   UOP: 1.5 L per urinal.       Last Bowel Movement: 7/5/22.   LVAD: HM3. Speed 5300.  Low speed 4900.  Flows: 4.3-4.7.  PI: 3.2-4.1.  Power: 3.8-4.1.  Neuro: Pupils equal and reactive. AAOx4, follows commands, and moves all extremities purposefully. Decreased mobility to bilateral feet due to pain.      Diet:  Regular. 1.5 L Fluid restriction.   Labs/Accuchecks:   K and mag Q6 hrs. ACHS.      Plan:   Wean Epi by 0.01 QD. Wean oxygen as tolerated. Continue ICU care. Plan of care reviewed with patient and significant other, all questions answered.     Skin:  Midsternal incision covered with island border, CDI. LVAD exit site a 2 on assessment. Weight shifting assistance provide Q2 hrs. No new skin breakdown noted.

## 2022-07-08 NOTE — PLAN OF CARE
Recommendations    1. Continue cardiac/consistent carbohydrate diet, encourage adequate intake -fluid per MD   2. RD following    Goals: Meet % EEN/EPN by RD f/u  Nutrition Goal Status: progressing towards goal  Communication of RD Recs: other (POC)

## 2022-07-08 NOTE — PROGRESS NOTES
"[facility] - [facility] Intensive Care  Adult Nutrition  Progress Note    SUMMARY       Recommendations    1. Continue cardiac/consistent carbohydrate diet, encourage adequate intake -fluid per MD   2. RD following    Goals: Meet % EEN/EPN by RD f/u  Nutrition Goal Status: progressing towards goal  Communication of RD Recs: other (POC)    Assessment and Plan  Nutrition Problem  Increased protein needs      Related to (etiology):   Physiological values      Signs and Symptoms (as evidenced by):   LVAD w/u     Interventions (treatment strategy):  Collaboration of nutrition care w/ other providers      Nutrition Diagnosis Status:   Continues    Reason for Assessment    Reason For Assessment: RD follow-up  Diagnosis: cardiac disease  Relevant Medical History: CHF, DM, cardiomyopathy  Interdisciplinary Rounds: attended  General Information Comments: Spoke w/ pt and pt's caregiver at bedside. Pt reports good appetite and 50% meal intake since last RD visit, 7/01/2022. Pt not interested in receiving dairy based ONS and Boost Breeze is not appropriate for pt's diabetes management. Per chart review, pt has gained 3.8kg x 1 wk due to fluid flucutation. No n/v/c/d. No issues chewing/swallowing. NFPE completed 6/22, no s/s of malnutrition at this time.  LBM: 7/05/2022 per RN documentation.  Nutrition Discharge Planning: pending medical course    Nutrition Risk Screen    Nutrition Risk Screen: no indicators present    Nutrition/Diet History    Spiritual, Cultural Beliefs, Druze Practices, Values that Affect Care: no  Food Allergies:  (lactose)    Anthropometrics    Temp: 98.3 °F (36.8 °C)  Height Method: Stated  Height: 6' 3" (190.5 cm)  Height (inches): 75 in  Weight Method: Bed Scale  Weight: 97.5 kg (214 lb 15.2 oz)  Weight (lb): 214.95 lb  Ideal Body Weight (IBW), Male: 196 lb  % Ideal Body Weight, Male (lb): 109.67 %  BMI (Calculated): 26.9  BMI Grade: 25 - 29.9 - overweight       Lab/Procedures/Meds    Pertinent Labs " Reviewed: reviewed  Pertinent Labs Comments: Sodium 131, Chloride 88, BUN 38, Creatinine 1.5, Glucose 141, Alkaline Phosphatase 161, Bilirubin Total 1.5, AST 59, , .6  Pertinent Medications Reviewed: reviewed  Pertinent Medications Comments: Warfarin, Pantoprazole, Amlodipine, Lidocaine    Estimated/Assessed Needs    Weight Used For Calorie Calculations: 97.5 kg (214 lb 15.2 oz)  Energy Calorie Requirements (kcal): 2183  Energy Need Method: [place]-St Jeor (PAL:1.1)  Protein Requirements: 117-125g (1.2-1.3g/kg)  Weight Used For Protein Calculations: 97.5 kg (214 lb 15.2 oz)  Fluid Requirements (mL): 1mL/kcal or fluid per MD     RDA Method (mL): 2183  CHO Requirement: 272      Nutrition Prescription Ordered    Current Diet Order: Cardiac/Consistent Carbohydrate    Evaluation of Received Nutrient/Fluid Intake    I/O: -27.2L since 6/24/2022  Energy Calories Required: not meeting needs  Protein Required: not meeting needs  Fluid Required: not meeting needs  Total Fluid Intake (mL/kg): 1mL/kcal or fluid per MD  Comments: LBM 6/27  Tolerance: tolerating  % Intake of Estimated Energy Needs: 50%  % Meal Intake: 50%    Nutrition Risk    Level of Risk/Frequency of Follow-up: low     Monitor and Evaluation    Food and Nutrient Intake: energy intake, food and beverage intake  Food and Nutrient Adminstration: diet order  Knowledge/Beliefs/Attitudes: food and nutrition knowledge/skill, beliefs and attitudes  Physical Activity and Function: nutrition-related ADLs and IADLs, factors affecting access to physical activity  Anthropometric Measurements: growth pattern indices/percentile ranks, body mass index, weight change, weight, height/length  Biochemical Data, Medical Tests and Procedures: lipid profile, inflammatory profile, glucose/endocrine profile, gastrointestinal profile, electrolyte and renal panel  Nutrition-Focused Physical Findings: skin, head and eyes, extremities, muscles and bones, overall appearance      Nutrition Follow-Up    RD Follow-up?: Yes     By Kinga Horvath, Dietetic Intern

## 2022-07-08 NOTE — PROGRESS NOTES
After discussing equipment with patient, patient needed a new set of battery clips because his previous battery clips [id number].   Lot#[id number] given to patient today in clinic and patient educated on proper use of item and maintenance. These battery clips are medically necessary for proper care and maintenance of LVAD system.MCS connect updated and VAD snapshot updated today.It is medically necessary to ensure patient has properly functioning equipment and wearables to prevent infection, injury or death to patient.

## 2022-07-08 NOTE — PT/OT/SLP PROGRESS
Physical Therapy      Patient Name:  Kevan Queen   MRN:  [id number]    Patient not seen today secondary to  (pt not seen due to pain in B ankles 8 of 10 and pt stating that he could not stand.). Will follow-up at a later date.    7/8/2022  .

## 2022-07-08 NOTE — PT/OT/SLP PROGRESS
"Occupational Therapy   Treatment    Name: Kevan Queen  MRN: [id number]  Admitting Diagnosis:  LVAD (left ventricular assist device) present  8 Days Post-Op    Recommendations:     Discharge Recommendations: rehabilitation facility  Discharge Equipment Recommendations:   (TBD)    Assessment:     Kevan Queen is a 37 y.o. male with a medical diagnosis of LVAD (left ventricular assist device) present.  Pt presents with poor tolerance for therapy on this date and was not motivated to participate in therapy. Pt reported significant pain in ankles and "gut" hindering pt participation in therapy on this date. PT attempted treatment in AM but pt refused d/t pain. OT attempted treatment in PM with pt only receptive to relaxation and LVAD education. Pt refused ADL participation and exercises for pain. Pt also presenting with RUE cramping causing deformity of R hand with significant pain noted. Pt presenting with significant edema in B ankles and feet, refusing adjustment of positioning and elevation of BLE.  Anticipate pt will progress with continued OT services to address functional endurance and strength when pain has resolved and will benefit from further OT services to address the following deficits.     Performance deficits affecting function are weakness, impaired endurance, impaired self care skills, impaired sensation, impaired functional mobilty, decreased coordination, decreased upper extremity function, decreased lower extremity function, pain, impaired fine motor, edema.     Rehab Prognosis:  Fair; patient would benefit from acute skilled OT services to address these deficits and reach maximum level of function.       Pt LVAD found on wallpower. Pt remained on wall-power throughout session d/t pt refusing functional mobility. Pt demonstrated transfer from wall power <> battery power for education purposes. Pt LVAD left on wall power following session.     Plan:     Patient to be seen 6 x/week to address the above " "listed problems via self-care/home management, therapeutic activities, therapeutic exercises  · Plan of Care Expires: 08/02/22  · Plan of Care Reviewed with: patient, significant other    Subjective   "Y'all don't understand, this pain is too much"  Pain/Comfort:  · Pain Rating 1: 10/10  · Location - Side 1: Bilateral  · Location 1: ankle  · Pain Addressed 1: Distraction  · Pain Rating 2: 10/10  · Location - Side 2: Bilateral  · Location 2: lower quadrant  · Pain Addressed 2: Distraction    Objective:     Communicated with: Kaushik prior to session.  Patient found up in chair with telemetry, pulse ox (continuous), peripheral IV, oxygen, blood pressure cuff, arterial line, PICC line upon OT entry to room.    General Precautions: Standard, fall, LVAD, sternal   Orthopedic Precautions:N/A   Respiratory Status: NO via NC     Occupational Performance:     Functional Mobility/Transfers:  · Pt refused functional mobility d/t pain in BLE and "gut".    Activities of Daily Living:  · Pt refused d/t pain in BLE and "gut".      [facility] 6 Click ADL: 12    Treatment & Education:  Pt educated on OT POC, purpose of OT services and safety with ADLs and functional mobility.   Pt educated on:  - Importance and benefit of OOB activity  - Sternal precautions (pt verbalized 3/3)  - Pt advocacy for care  - HEP (attempted review; pt verbalized knowing exercises but refused participation d/t pain)  - Daily orientation  - Family involvement with care (LVAD training)  - Energy Conservation techniques (pursed-lip breathing, taking rest breaks prn)  - Relaxation techniques (sitting back, taking 3-5 deep breaths, opening chest, resting arms); pt not receptive to techniques reporting too much pain to participation    Pt requested break in order to eat fruit cup prior to LVAD education.   - LVAD education   Components for battery - pt identified LVAD battery and clip, connecting & disconnecting battery and clip, transfer wall power <> battery power with " minimal cues for technique   Caregiver, Robyn, present and engaged with LVAD education; demonstrated ability to transition LVAD wall <> battery power   Introduced concept of battery rotation purpose and process.     Patient left up in chair with all lines intact, call button in reach, chair alarm off, nsg notified and significant other presentEducation:      GOALS:   Multidisciplinary Problems     Occupational Therapy Goals        Problem: Occupational Therapy    Goal Priority Disciplines Outcome Interventions   Occupational Therapy Goal     OT, PT/OT Ongoing, Progressing    Description: Goals to be met by: 8/2/22     Patient will increase functional independence with ADLs by performing:    UE Dressing with Modified [place].  LE Dressing with Modified [place] and Assistive Devices as needed.  Grooming while standing at sink with Modified [place].  Toileting from toilet with Modified [place] for hygiene and clothing management.   Bathing from  shower chair/bench with Modified [place].  Toilet transfer to toilet with Modified [place].  Increased functional strength to WFL for B UE.  Upper extremity exercise program x15 reps per handout, with assistance as needed.  Pt will be I in all LVAD management.                     Time Tracking:     OT Date of Treatment: 07/08/22  OT Start Time: 1242  OT Stop Time: 1258  OT Total Time (min): 16 min    Billable Minutes:Therapeutic Activity 16      LVAD Education  OT Start time: 1339  OT End time: 1347  OT Total Time (min): 8min    Billable Minutes: Therapeutic Activity 8             7/8/2022

## 2022-07-08 NOTE — ASSESSMENT & PLAN NOTE
Endocrinology consulted for BG management.   BG goal 140-180    - Transition insulin infusion at 1 u/hr with step-down parameters.   - Novolog 14 units TIDWM and prn for BG excursions [facility] (150/25) SSI.  - BG monitoring ac/hs/0200 and moderate dose correction scale.   - Hypoglycemia protocol in place.     ** Please notify Endocrine for any change and/or advance in diet**  ** Please call Endocrine for any BG related issues **    Discharge Planning:   TBD. Please notify endocrinology prior to discharge.

## 2022-07-08 NOTE — PROGRESS NOTES
Pt aaox3 while sitting up in the chair with caregiver at bedside.  LVAD history interrogated with no abnormal events noted.  LVAD numbers WNL. Approximately 30 minutes was spent with the patient providing education. Pt denies any needs at this time. LVAD education continued at bedside with patient. Patient educated on below information:   VAD Binder given to patient to fill out. Reviewed with patient the workbook, encouraged patient to work through while learning. Also reviewed with patient their Log Sheets, patient and caregiver  in filling out today's information.   : Reviewed in depth the . Patient was shown the display button, silence button, and battery button and what each button does including silencing alarms, checking battery status, and toggle through LCD screen. Reviewed how to preform self  test and review last 6 alarms. Also reviewed with patient the lights and sounds the controller makes during alarms   DEVICE PARTS: Educated patient on Pieces of the LVAD device. Reviewed , modular cable.   Pt verbalized understanding but was clearly having a lot of pain due to coughing and had received pain medicine   Encouraged pt to notify nurse if they have any questions, problems or concerns for LVAD coordinator.  Pt verbalized understanding and in agreement of plan. Will follow up with pt soon.    PATIENT IS NOT CHECKED OFF ON ALARMS  CAREGIVER IS NOT CHECKED OFF ON DRESSING CHANGE

## 2022-07-08 NOTE — PROGRESS NOTES
[facility]  Heart Transplant  Progress Note    Patient Name: Kevan Queen  MRN: [id number]  Admission Date: 6/13/2022  Hospital Length of Stay: 25 days  Attending Physician: Luis F Paige MD  Primary Care Provider: ORALIA Cline  Principal Problem:LVAD (left ventricular assist device) present    Subjective:     **Interval History: Net negative 2.5L/24h on lasix gtt at 20mg/hr and epi .05. Remains volume overloaded however Na improved with above diuresis. Last recorded CVP 23 prior to getting up in chair this AM before shift change. CO 7.4 CI 3.3 on epi .05, weaning slowly. Marcella weaned to off this AM. Recommend continued diuresis and to discontinue salt tablets. INR 2.8 from 2.0 yesterday, would recommend cutting warfarin dose by 50%.      Continuous Infusions:   sodium chloride 0.9% 5 mL/hr at 06/27/22 0055    sodium chloride 0.9% Stopped (07/05/22 1314)    EPINEPHrine 0.04 mcg/kg/min (07/08/22 1300)    furosemide (LASIX) 2 mg/mL continuous infusion (non-titrating) 20 mg/hr (07/08/22 1300)    insulin regular 1 units/mL infusion orderable (TRANSFER) 1 Units/hr (07/08/22 1300)    nicardipine Stopped (07/05/22 2204)    nitric oxide gas       Scheduled Meds:   amLODIPine  10 mg Oral Daily    ferrous gluconate  324 mg Oral Daily with breakfast    gabapentin  300 mg Oral TID    insulin aspart U-100  14 Units Subcutaneous TIDWM    INV aspirin/placebo  100 mg Oral Daily    LIDOcaine  1 patch Transdermal Q24H    pantoprazole  40 mg Oral Daily    [START ON 7/9/2022] polyethylene glycol  17 g Oral Daily    potassium bicarbonate  50 mEq Oral BID    sodium chloride 0.9%  10 mL Intravenous Q6H    sodium chloride  1 g Oral BID    tiZANidine  2 mg Oral Q8H    warfarin  6 mg Oral Daily     PRN Meds:albumin human 5%, albuterol sulfate, bisacodyL, dextrose 10%, dextrose 10%, glucagon (human recombinant), glucose, glucose, hydrALAZINE, HYDROmorphone, insulin aspart U-100, magnesium  hydroxide 400 mg/5 ml, magnesium sulfate IVPB, potassium bicarbonate, potassium bicarbonate, potassium bicarbonate, sodium chloride 0.9%, sodium chloride 0.9%, Flushing PICC Protocol **AND** sodium chloride 0.9% **AND** sodium chloride 0.9%, traMADoL    Review of patient's allergies indicates:   Allergen Reactions    Aspirin Other (See Comments)     Mr. Thacker is enrolled in Dr. Paige's Alon Trial and cannot have any aspirin and/or aspirin-containing products. DO NOT cancel any orders for INV Aspirin 100 mg/Placebo. If you have questions, please contact Isabel @ [phone], [phone],[email or web address], secure chat or MS Teams message.    Bumex [bumetanide] Hives    Lactose Diarrhea     Other reaction(s): Abdominal distension, gaseous    Torsemide Hives     Objective:     Vital Signs (Most Recent):  Temp: 98.3 °F (36.8 °C) (07/08/22 1105)  Pulse: 106 (07/08/22 1215)  Resp: 19 (07/08/22 1215)  BP: (!) 88/0 (07/08/22 1105)  SpO2: 100 % (07/08/22 1105)   Vital Signs (24h Range):  Temp:  [98 °F (36.7 °C)-98.7 °F (37.1 °C)] 98.3 °F (36.8 °C)  Pulse:  [] 106  Resp:  [7-61] 19  SpO2:  [95 %-100 %] 100 %  BP: (78-88)/(0) 88/0  Arterial Line BP: ()/() 86/75     Patient Vitals for the past 72 hrs (Last 3 readings):   Weight   07/08/22 1200 97.5 kg (214 lb 15.2 oz)   07/07/22 0500 97.5 kg (214 lb 15.2 oz)   07/05/22 1358 93.7 kg (206 lb 9.1 oz)       Body mass index is 26.87 kg/m².      Intake/Output Summary (Last 24 hours) at 7/8/2022 1347  Last data filed at 7/8/2022 1300  Gross per 24 hour   Intake 1365.01 ml   Output 3625 ml   Net -2259.99 ml         Hemodynamic Parameters:       Telemetry: ST    Physical Exam  Vitals and nursing note reviewed.   Constitutional:       Appearance: Normal appearance.      Comments: Sitting in chair NAD   HENT:      Head: Normocephalic and atraumatic.   Eyes:      Extraocular Movements: Extraocular movements intact.      Conjunctiva/sclera: Conjunctivae normal.    Neck:      Comments: JVD elevation to jawline   Cardiovascular:      Rate and Rhythm: Regular rhythm. Tachycardia present.      Comments: Smooth VAD hum  Pulmonary:      Breath sounds: Normal breath sounds.   Abdominal:      Palpations: Abdomen is soft.   Musculoskeletal:         General: Swelling present.      Cervical back: Neck supple.      Comments: Bilateral pedal edema   Skin:     General: Skin is dry.      Capillary Refill: Capillary refill takes 2 to 3 seconds.   Neurological:      Mental Status: He is alert.       Significant Labs:  CBC:  Recent Labs   Lab 07/06/22  0402 07/07/22  0319 07/08/22  0408   WBC 21.55* 21.04* 17.30*   RBC 2.56* 2.58* 2.58*   HGB 7.2* 7.1* 7.1*   HCT 22.6* 22.8* 22.6*    302 348   MCV 88 88 88   MCH 28.1 27.5 27.5   MCHC 31.9* 31.1* 31.4*       BNP:  Recent Labs   Lab 07/04/22  0301 07/06/22  0402 07/08/22  0408   * 955* 677*       CMP:  Recent Labs   Lab 07/06/22  0402 07/06/22  1000 07/07/22  0319 07/07/22  1607 07/07/22  2215 07/08/22  0408 07/08/22  1005   *  --  139*  --   --  141*  --    CALCIUM 9.0  --  8.7  --   --  9.3  --    ALBUMIN 3.2*  --  3.2*  --   --  3.2*  --    PROT 6.9  --  6.8  --   --  7.0  --    *  --  128*  --   --  131*  --    K 4.9  4.8   < > 3.6  3.6  3.6   < > 4.6 3.9  4.0 4.3   CO2 28  --  28  --   --  27  --    CL 86*  --  88*  --   --  88*  --    BUN 40*  --  34*  --   --  38*  --    CREATININE 1.6*  --  1.3  --   --  1.5*  --    ALKPHOS 147*  --  146*  --   --  161*  --    *  --  204*  --   --  157*  --    *  --  71*  --   --  59*  --    BILITOT 1.9*  --  1.5*  --   --  1.5*  --     < > = values in this interval not displayed.        Coagulation:   Recent Labs   Lab 07/06/22  0402 07/06/22  1149 07/07/22  0008 07/07/22  0524 07/08/22  0408   INR 1.8*  --   --  2.0* 2.8*   APTT 54.3*   < > 51.0* 48.2* 35.3*    < > = values in this interval not displayed.       LDH:  Recent Labs   Lab 07/06/22 0402  "07/07/22  0319 07/08/22  0408   * 544* 501*       Microbiology:  Microbiology Results (last 7 days)       Procedure Component Value Units Date/Time    Blood culture [[id number]] Collected: 07/02/22 1629    Order Status: Completed Specimen: Blood from Peripheral, Hand, Right Updated: 07/07/22 1812     Blood Culture, Routine No growth after 5 days.    Blood culture [[id number]] Collected: 07/02/22 1618    Order Status: Completed Specimen: Blood from Peripheral, Antecubital, Right Updated: 07/07/22 1812     Blood Culture, Routine No growth after 5 days.    Blood culture [[id number]] Collected: 06/30/22 1836    Order Status: Completed Specimen: Blood from Peripheral, Wrist, Left Updated: 07/05/22 2012     Blood Culture, Routine No growth after 5 days.    Blood culture [[id number]] Collected: 06/30/22 1833    Order Status: Completed Specimen: Blood from Peripheral, Forearm, Right Updated: 07/05/22 2012     Blood Culture, Routine No growth after 5 days.            I have reviewed all pertinent labs within the past 24 hours.    Estimated Creatinine Clearance: 80.6 mL/min (A) (based on SCr of 1.5 mg/dL (H)).    Diagnostic Results:  I have reviewed and interpreted all pertinent imaging results/findings within the past 24 hours.    Assessment and Plan:     36 yo male with NIDCM with BiV systolic heart failure, on home Milrinone at 0.375 mcg/kg/min, presented at [facility] 4/2/22 ,was not evaluated for OHT as he has recently quit smoking in April 2022 but was approved for VAD with plan to begin OHT evaluation in upcoming months if Mr Queen is stable and suitable for OHT eval (blood group A), issues with frozen shoulder following ICD implant in the past, had clinic appointment last week to f/u recent admit for hyperglycemic hyperosmolar syndrome but did not come as he was "feeling too bad" presents to our ED with SOB at rest for 1 week, 6# weight gain (reports dry weight is 217#), inability to sleep past 3 nights 2/2 SOB " (says he sleep on his side). Went to ED at [facility] 6/10 but left after waiting 4 hours. Had clinic appointment with us today, but arrived to [facility] early this morning and decided to go to the ED instead. Baseline Lasix dose is 80 mg bid. Reports taking 240 mg qd past 3 days with no improvement. BNP is 1701, up from 898 on 6/2 and 49 on 5/24. sCr is 1.8 with baseline ~ 1.5-1.7. sPO2 on RA is 93%. Wife at bedside    He has been given Lasix 80 mg IVP in the ED with plan to start Lasix gtt at 20 mg/hr           * LVAD (left ventricular assist device) present  -S/P DT HM3 with MVR plus Protek Duo for RV support 6/29 (Grecia)  -Rvad removed with concern for hemolysis, Marcella increased to 20,  -Post op antibiotic per CTS (h/o Staph epi bacteremia, cleareed by ID for surgery with rec to continue antibiotic coverage for 2 weeks post op)  - GASTON trial   -Speed set at 5300 (increased to 5300 on 7/6)   -Echo done 7/5 EF: 10%, LVEDD: 7.13 with speed at 5200.  Would repeat echo in couple days after speed change   -Would increase lasix drip (+ adding metolazone).  - off.  Would consider Primacor  if needed for inotropic support   -INR is 2.8 today (goal 2-3). With rapid increase, would recommend decreasing warfarin dose by 50%.    Procedure: Device Interrogation Including analysis of device parameters  Current Settings: Ventricular Assist Device  Review of device function is stable    TXP LVAD INTERROGATIONS 7/8/2022 7/8/2022 7/8/2022 7/8/2022 7/8/2022 7/8/2022 7/8/2022   Type HeartMate3 HeartMate3 HeartMate3 HeartMate3 HeartMate3 HeartMate3 HeartMate3   Flow 4.3 4.6 4.7 4.6 4.5 4.7 4.7   Speed 5300 5300 5300 5300 5300 5300 5300   PI 4 3.8 3.7 3.5 4.1 3.3 3.2   Power (Serna) 3.8 3.8 3.8 3.8 3.9 3.8 3.8   LSL 4900 4900 4900 4900 4900 4900 4900   Pulsatility - - Intermittent pulse - - - Intermittent pulse       Hyponatremia  -hypervolemic  -Improvement in Na with diuresis, recommend continued diuresis and discontinuation  of salt tabs in setting of volume overload    Staphylococcus epidermidis bacteremia  -Blood cultures 6/20 and repeat 6/21 positive for Staph Epi. One of two blood cultures from 6/23 positive for Staph (taken prior to line exchange). Repeat cultures sent 6/25 NGTD.   -IJ exchanged on 6/23, IABP exchanged 6/23  -ID recommended 14 day course of vancomycin from VAD implantation on 6/29 with end date: 7/12/22. Vancomycin discontinued per CTS with concerns for elevated sCr. ID with new recs to complete 7d course of daptomycin, which was completed 7/7/22.        Uncontrolled diabetes mellitus  Admitted 5/24-5/27 with hyperglycemia hyperosmolar syndrome  -Hgb A1C 9.2 on 5/20/22  -Endocrine following, on insulin gtt    CKD (chronic kidney disease) stage 3, GFR 30-59 ml/min  SCr baseline ~ 1.5-1.7      Acute on chronic combined systolic and diastolic congestive heart failure, NYHA class 4  - NIDCM  - Was not evaluated for OHTx as he quit smoking in April 2022.   - Received HM3 on 6/29  - See LVAD    Uninterrupted Critical Care/Counseling Time (not including procedures): 60 minutes        Lupe Villavicencio PA-C  Heart Transplant  [facility]

## 2022-07-08 NOTE — PROGRESS NOTES
07/08/2022  Casey Arizmendi    Current provider:  Luis F Paige MD    Device interrogation:  TXP LVAD INTERROGATIONS 7/8/2022 7/8/2022 7/8/2022 7/8/2022 7/8/2022 7/8/2022 7/8/2022   Type HeartMate3 HeartMate3 HeartMate3 HeartMate3 HeartMate3 HeartMate3 HeartMate3   Flow 4.7 4.6 4.7 4.7 4.8 4.8 4.8   Speed 5300 5300 5300 5300 5300 5300 5300   PI 3.4 3.4 3.3 3.3 3.0 3.1 3.2   Power (Serna) 3.7 3.8 3.8 3.9 3.8 3.7 3.8   LSL - - - - - - -   Pulsatility - - - - - - -          Rounded on Kevan Queen to ensure all mechanical assist device settings (IABP or VAD) were appropriate and all parameters were within limits.  I was able to ensure all back up equipment was present, the staff had no issues, and the Perfusion Department daily rounding was complete.      For implantable VADs: Interrogation of Ventricular assist device was performed with analysis of device parameters and review of device function. I have personally reviewed the interrogation findings and agree with findings as stated.     In emergency, the nursing units have been notified to contact the perfusion department either by:  Calling [phone] from 630am to 4pm Mon thru Fri, utilizing the On-Call Finder functionality of Epic and searching for Perfusion, or by contacting the hospital  from 4pm to 630am and on weekends and asking to speak with the perfusionist on call.    6:41 AM

## 2022-07-08 NOTE — ASSESSMENT & PLAN NOTE
Kevan Queen is a 37yoM with a history of polysubstance abuse who presented to the hospital with decompensated heart failure. He underwent LVAD, RVAD and MVr on 6/29/22. He is admitted to the ICU post-operatively.       Neuro/Psych:   -- Sedation: none  -- Pain: Added scheduled tizanidine, lidocaine patch; PRN Tramadol and dilaudid for breakthough  with improvement in pain control; Will discuss addition of home gabapentin for foot pain with staff          Cards:   -- s/p LVAD, RVAD insertion and MVr. RVAD removed 7/1  -- HDS on epi 0.05, dobutamine off; Wean epi by 0.1 QD  -- s/p chest closure 6/30   -- MAP goal 75-85  -- Off cardene, PRN hydral available, continue norvasc      Pulm:   -- Goal O2 sat > 90%  -- ABG PRN  -- Extubated 7/1  -- iNO2 @ 1ppm; goal to be off today  -- daily CXR      Renal:  -- Trend BUN/Cr   -- lasix gtt at 20  -- Goal net negative under 1L       FEN / GI:   -- Replace lytes as needed  -- Nutrition: cardiac  -- GI ppx: famotidine  -- Bowel reg: miralax  -- Salt tabs in setting of hyponatremia, improving  -- Hepatology consult to acute liver failure, appreciate recs. Liver enzymes near normal.      ID:   -- Tm:afebrile; WBC stable  -- Currently on dapto, last day tomorrow  -- Blood Cx from 7/2, remain negative      Heme/Onc:   -- H/H stable   -- Daily CBC  -- no intra-op product administered  -- 2u autologous  -- 1u pRBC, 1u FFP, 500mL albumin overnight (6/29)  -- Heparin gtt off, Warfarin 6 mg QD      Endo:   -- BG goal 140-180  -- SSI   -- endocrine consulted      PPx:   Feeding: cardiac  Analgesia/Sedation:none / PRN oxy,Fentanyl  Thromboembolic prevention: SCDs, warfarin   HOB >30: Yes  Stress Ulcer ppx: famotidine  Glucose control: Critical care goal 140-180 g/dl, ISS     Lines/Drains/Airway: R PICC, L brachial A-line; LVAD      Dispo/Code Status/Palliative:   -- SICU / Full Code.

## 2022-07-08 NOTE — PROGRESS NOTES
[facility]  Critical Care - Surgery  Progress Note    Patient Name: Kevan Queen  MRN: [id number]  Admission Date: 6/13/2022  Hospital Length of Stay: 25 days  Code Status: Full Code  Attending Provider: Luis F Paige MD  Primary Care Provider: ORALIA Cline   Principal Problem: LVAD (left ventricular assist device) present    Subjective:     Hospital/ICU Course:  No notes on file    Interval History/Significant Events: NAEON. Nitric weaned to 1. Remains on 0.5 of epi. Hep gtt discontinued yesterday given therapeutic on warfarin.     Follow-up For: Procedure(s) (LRB):  CLOSURE, WOUND, STERNUM (N/A)  INSERTION-RIGHT VENTRICULAR ASSIST DEVICE (Right)  APPLICATION, WOUND VAC (N/A)  IRRIGATION, MEDIASTINUM    Post-Operative Day: 8 Days Post-Op    Objective:     Vital Signs (Most Recent):  Temp: 98 °F (36.7 °C) (07/08/22 0715)  Pulse: 99 (07/08/22 0715)  Resp: (!) 61 (07/08/22 0715)  BP: (!) 80/0 (07/08/22 0715)  SpO2: 98 % (07/08/22 0500)   Vital Signs (24h Range):  Temp:  [98 °F (36.7 °C)-98.7 °F (37.1 °C)] 98 °F (36.7 °C)  Pulse:  [] 99  Resp:  [7-61] 61  SpO2:  [95 %-99 %] 98 %  BP: (78-84)/(0) 80/0  Arterial Line BP: ()/(60-90) 80/71     Weight: 97.5 kg (214 lb 15.2 oz)  Body mass index is 26.87 kg/m².      Intake/Output Summary (Last 24 hours) at 7/8/2022 0756  Last data filed at 7/8/2022 0600  Gross per 24 hour   Intake 1558.24 ml   Output 3650 ml   Net -2091.76 ml       Physical Exam  Vitals reviewed.   Constitutional:       General: He is not in acute distress.  HENT:      Head: Normocephalic and atraumatic.      Nose: Nose normal.      Mouth/Throat:      Mouth: Mucous membranes are moist.   Eyes:      Pupils: Pupils are equal, round, and reactive to light.   Neck:      Comments:     Cardiovascular:      Rate and Rhythm: Normal rate and regular rhythm.      Pulses: Normal pulses.      Comments: S/p chest closure. Incision c/d/I with iodine gauze    LVAD speed 5300  Flows  ~4L    L brachial art line      Pulmonary:      Effort: Pulmonary effort is normal. No respiratory distress.      Comments: On 4L NC  Abdominal:      General: Abdomen is flat. There is no distension.      Palpations: Abdomen is soft.   Musculoskeletal:      Comments: IABP site at left fem with dressing in place.    R PICC   Skin:     General: Skin is warm and dry.      Capillary Refill: Capillary refill takes less than 2 seconds.   Neurological:      General: No focal deficit present.      Mental Status: He is alert and oriented to person, place, and time.     Lines/Drains/Airways       Peripherally Inserted Central Catheter Line  Duration             PICC Triple Lumen 07/05/22 1554 right brachial 2 days              Arterial Line  Duration             Arterial Line 06/29/22 0832 Left Brachial 8 days              Line  Duration                  VAD 06/29/22 1115 Left ventricular assist device HeartMate 3 8 days              Peripheral Intravenous Line  Duration                  Peripheral IV - Single Lumen 07/08/22 0300 20 G Anterior;Right Forearm <1 day                    Significant Labs:    CBC/Anemia Profile:  Recent Labs   Lab 07/07/22  0319 07/08/22  0408   WBC 21.04* 17.30*   HGB 7.1* 7.1*   HCT 22.8* 22.6*    348   MCV 88 88   RDW 16.0* 16.0*        Chemistries:  Recent Labs   Lab 07/07/22  0319 07/07/22  1607 07/07/22  2215 07/08/22  0408   *  --   --  131*   K 3.6  3.6  3.6 4.3 4.6 3.9  4.0   CL 88*  --   --  88*   CO2 28  --   --  27   BUN 34*  --   --  38*   CREATININE 1.3  --   --  1.5*   CALCIUM 8.7  --   --  9.3   ALBUMIN 3.2*  --   --  3.2*   PROT 6.8  --   --  7.0   BILITOT 1.5*  --   --  1.5*   ALKPHOS 146*  --   --  161*   *  --   --  157*   AST 71*  --   --  59*   MG 2.5  2.5 2.5 2.3 2.3   PHOS 3.3  --   --  3.4       All pertinent labs within the past 24 hours have been reviewed.    Significant Imaging:  I have reviewed all pertinent imaging results/findings within the  past 24 hours.    Assessment/Plan:     Acute on chronic combined systolic and diastolic congestive heart failure, NYHA class 4  Kevan Queen is a 37yoM with a history of polysubstance abuse who presented to the hospital with decompensated heart failure. He underwent LVAD, RVAD and MVr on 6/29/22. He is admitted to the ICU post-operatively.       Neuro/Psych:   -- Sedation: none  -- Pain: Added scheduled tizanidine, lidocaine patch; PRN Tramadol and dilaudid for breakthough  with improvement in pain control; Will discuss addition of home gabapentin for foot pain with staff          Cards:   -- s/p LVAD, RVAD insertion and MVr. RVAD removed 7/1  -- HDS on epi 0.05, dobutamine off; Wean epi by 0.1 QD  -- s/p chest closure 6/30   -- MAP goal 75-85  -- Off cardene, PRN hydral available, continue norvasc      Pulm:   -- Goal O2 sat > 90%  -- ABG PRN  -- Extubated 7/1  -- iNO2 @ 1ppm; goal to be off today  -- daily CXR      Renal:  -- Trend BUN/Cr   -- lasix gtt at 20  -- Goal net negative under 1L       FEN / GI:   -- Replace lytes as needed  -- Nutrition: cardiac  -- GI ppx: famotidine  -- Bowel reg: miralax  -- Salt tabs in setting of hyponatremia, improving  -- Hepatology consult to acute liver failure, appreciate recs. Liver enzymes near normal.      ID:   -- Tm:afebrile; WBC stable  -- Currently on dapto, last day tomorrow  -- Blood Cx from 7/2, remain negative      Heme/Onc:   -- H/H stable   -- Daily CBC  -- no intra-op product administered  -- 2u autologous  -- 1u pRBC, 1u FFP, 500mL albumin overnight (6/29)  -- Heparin gtt off, Warfarin 6 mg QD      Endo:   -- BG goal 140-180  -- SSI   -- endocrine consulted      PPx:   Feeding: cardiac  Analgesia/Sedation:none / PRN oxy,Fentanyl  Thromboembolic prevention: SCDs, warfarin   HOB >30: Yes  Stress Ulcer ppx: famotidine  Glucose control: Critical care goal 140-180 g/dl, ISS     Lines/Drains/Airway: R PICC, L brachial A-line; LVAD      Dispo/Code  Status/Palliative:   -- SICU / Full Code.            Michael Quinn MD  Critical Care - Surgery  [facility]

## 2022-07-09 LAB
ALBUMIN SERPL BCP-MCNC: 3 G/DL (ref 3.5–5.2)
ALP SERPL-CCNC: 171 U/L (ref 55–135)
ALT SERPL W/O P-5'-P-CCNC: 114 U/L (ref 10–44)
ANION GAP SERPL CALC-SCNC: 13 MMOL/L (ref 8–16)
APTT BLDCRRT: 35.9 SEC (ref 21–32)
AST SERPL-CCNC: 53 U/L (ref 10–40)
BASOPHILS # BLD AUTO: 0.02 K/UL (ref 0–0.2)
BASOPHILS NFR BLD: 0.1 % (ref 0–1.9)
BILIRUB SERPL-MCNC: 1.2 MG/DL (ref 0.1–1)
BUN SERPL-MCNC: 34 MG/DL (ref 6–20)
CALCIUM SERPL-MCNC: 9 MG/DL (ref 8.7–10.5)
CHLORIDE SERPL-SCNC: 88 MMOL/L (ref 95–110)
CO2 SERPL-SCNC: 29 MMOL/L (ref 23–29)
CREAT SERPL-MCNC: 1.3 MG/DL (ref 0.5–1.4)
DIFFERENTIAL METHOD: ABNORMAL
EOSINOPHIL # BLD AUTO: 0.1 K/UL (ref 0–0.5)
EOSINOPHIL NFR BLD: 1 % (ref 0–8)
ERYTHROCYTE [DISTWIDTH] IN BLOOD BY AUTOMATED COUNT: 16.1 % (ref 11.5–14.5)
EST. GFR  (AFRICAN AMERICAN): >60 ML/MIN/1.73 M^2
EST. GFR  (NON AFRICAN AMERICAN): >60 ML/MIN/1.73 M^2
FIBRINOGEN PPP-MCNC: 738 MG/DL (ref 182–400)
GLUCOSE SERPL-MCNC: 202 MG/DL (ref 70–110)
HCT VFR BLD AUTO: 22.4 % (ref 40–54)
HGB BLD-MCNC: 6.9 G/DL (ref 14–18)
IMM GRANULOCYTES # BLD AUTO: 0.08 K/UL (ref 0–0.04)
IMM GRANULOCYTES NFR BLD AUTO: 0.5 % (ref 0–0.5)
INR PPP: 3.7 (ref 0.8–1.2)
LDH SERPL L TO P-CCNC: 481 U/L (ref 110–260)
LYMPHOCYTES # BLD AUTO: 2 K/UL (ref 1–4.8)
LYMPHOCYTES NFR BLD: 13.4 % (ref 18–48)
MAGNESIUM SERPL-MCNC: 2.2 MG/DL (ref 1.6–2.6)
MAGNESIUM SERPL-MCNC: 2.3 MG/DL (ref 1.6–2.6)
MAGNESIUM SERPL-MCNC: 2.4 MG/DL (ref 1.6–2.6)
MAGNESIUM SERPL-MCNC: 2.5 MG/DL (ref 1.6–2.6)
MCH RBC QN AUTO: 27 PG (ref 27–31)
MCHC RBC AUTO-ENTMCNC: 30.8 G/DL (ref 32–36)
MCV RBC AUTO: 88 FL (ref 82–98)
MONOCYTES # BLD AUTO: 1.3 K/UL (ref 0.3–1)
MONOCYTES NFR BLD: 9 % (ref 4–15)
NEUTROPHILS # BLD AUTO: 11.1 K/UL (ref 1.8–7.7)
NEUTROPHILS NFR BLD: 76 % (ref 38–73)
NRBC BLD-RTO: 1 /100 WBC
PHOSPHATE SERPL-MCNC: 3.5 MG/DL (ref 2.7–4.5)
PLATELET # BLD AUTO: 388 K/UL (ref 150–450)
PMV BLD AUTO: 10 FL (ref 9.2–12.9)
POCT GLUCOSE: 108 MG/DL (ref 70–110)
POCT GLUCOSE: 165 MG/DL (ref 70–110)
POCT GLUCOSE: 173 MG/DL (ref 70–110)
POCT GLUCOSE: 195 MG/DL (ref 70–110)
POCT GLUCOSE: 201 MG/DL (ref 70–110)
POCT GLUCOSE: 64 MG/DL (ref 70–110)
POCT GLUCOSE: 92 MG/DL (ref 70–110)
POCT GLUCOSE: 97 MG/DL (ref 70–110)
POTASSIUM SERPL-SCNC: 4.1 MMOL/L (ref 3.5–5.1)
POTASSIUM SERPL-SCNC: 4.3 MMOL/L (ref 3.5–5.1)
POTASSIUM SERPL-SCNC: 4.3 MMOL/L (ref 3.5–5.1)
POTASSIUM SERPL-SCNC: 4.4 MMOL/L (ref 3.5–5.1)
PROT SERPL-MCNC: 6.9 G/DL (ref 6–8.4)
PROTHROMBIN TIME: 35.3 SEC (ref 9–12.5)
RBC # BLD AUTO: 2.56 M/UL (ref 4.6–6.2)
SODIUM SERPL-SCNC: 130 MMOL/L (ref 136–145)
WBC # BLD AUTO: 14.65 K/UL (ref 3.9–12.7)

## 2022-07-09 PROCEDURE — 99233 SBSQ HOSP IP/OBS HIGH 50: CPT | Mod: ,,, | Performed by: INTERNAL MEDICINE

## 2022-07-09 PROCEDURE — 93750 INTERROGATION VAD IN PERSON: CPT | Mod: ,,, | Performed by: INTERNAL MEDICINE

## 2022-07-09 PROCEDURE — 85610 PROTHROMBIN TIME: CPT | Performed by: THORACIC SURGERY (CARDIOTHORACIC VASCULAR SURGERY)

## 2022-07-09 PROCEDURE — 63600175 PHARM REV CODE 636 W HCPCS

## 2022-07-09 PROCEDURE — 99232 PR SUBSEQUENT HOSPITAL CARE,LEVL II: ICD-10-PCS | Mod: ,,, | Performed by: NURSE PRACTITIONER

## 2022-07-09 PROCEDURE — 25000003 PHARM REV CODE 250: Performed by: STUDENT IN AN ORGANIZED HEALTH CARE EDUCATION/TRAINING PROGRAM

## 2022-07-09 PROCEDURE — 85025 COMPLETE CBC W/AUTO DIFF WBC: CPT

## 2022-07-09 PROCEDURE — 63600175 PHARM REV CODE 636 W HCPCS: Performed by: STUDENT IN AN ORGANIZED HEALTH CARE EDUCATION/TRAINING PROGRAM

## 2022-07-09 PROCEDURE — 85730 THROMBOPLASTIN TIME PARTIAL: CPT | Performed by: STUDENT IN AN ORGANIZED HEALTH CARE EDUCATION/TRAINING PROGRAM

## 2022-07-09 PROCEDURE — 27000221 HC OXYGEN, UP TO 24 HOURS

## 2022-07-09 PROCEDURE — 25000003 PHARM REV CODE 250

## 2022-07-09 PROCEDURE — 99291 CRITICAL CARE FIRST HOUR: CPT | Mod: ,,, | Performed by: ANESTHESIOLOGY

## 2022-07-09 PROCEDURE — 25000003 PHARM REV CODE 250: Performed by: THORACIC SURGERY (CARDIOTHORACIC VASCULAR SURGERY)

## 2022-07-09 PROCEDURE — 84132 ASSAY OF SERUM POTASSIUM: CPT

## 2022-07-09 PROCEDURE — 84100 ASSAY OF PHOSPHORUS: CPT

## 2022-07-09 PROCEDURE — 99900035 HC TECH TIME PER 15 MIN (STAT)

## 2022-07-09 PROCEDURE — 99232 SBSQ HOSP IP/OBS MODERATE 35: CPT | Mod: ,,, | Performed by: NURSE PRACTITIONER

## 2022-07-09 PROCEDURE — 83615 LACTATE (LD) (LDH) ENZYME: CPT | Performed by: THORACIC SURGERY (CARDIOTHORACIC VASCULAR SURGERY)

## 2022-07-09 PROCEDURE — 85384 FIBRINOGEN ACTIVITY: CPT | Performed by: STUDENT IN AN ORGANIZED HEALTH CARE EDUCATION/TRAINING PROGRAM

## 2022-07-09 PROCEDURE — 99291 PR CRITICAL CARE, E/M 30-74 MINUTES: ICD-10-PCS | Mod: ,,, | Performed by: ANESTHESIOLOGY

## 2022-07-09 PROCEDURE — 25000003 PHARM REV CODE 250: Performed by: ANESTHESIOLOGY

## 2022-07-09 PROCEDURE — 99233 PR SUBSEQUENT HOSPITAL CARE,LEVL III: ICD-10-PCS | Mod: ,,, | Performed by: INTERNAL MEDICINE

## 2022-07-09 PROCEDURE — 94799 UNLISTED PULMONARY SVC/PX: CPT

## 2022-07-09 PROCEDURE — 27000248 HC VAD-ADDITIONAL DAY

## 2022-07-09 PROCEDURE — 94761 N-INVAS EAR/PLS OXIMETRY MLT: CPT

## 2022-07-09 PROCEDURE — 93750 PR INTERROGATE VENT ASSIST DEV, IN PERSON, W PHYSICIAN ANALYSIS: ICD-10-PCS | Mod: ,,, | Performed by: INTERNAL MEDICINE

## 2022-07-09 PROCEDURE — 83735 ASSAY OF MAGNESIUM: CPT

## 2022-07-09 PROCEDURE — 80053 COMPREHEN METABOLIC PANEL: CPT

## 2022-07-09 PROCEDURE — 20000000 HC ICU ROOM

## 2022-07-09 PROCEDURE — A4216 STERILE WATER/SALINE, 10 ML: HCPCS | Performed by: THORACIC SURGERY (CARDIOTHORACIC VASCULAR SURGERY)

## 2022-07-09 RX ORDER — INSULIN ASPART 100 [IU]/ML
10 INJECTION, SOLUTION INTRAVENOUS; SUBCUTANEOUS
Status: DISCONTINUED | OUTPATIENT
Start: 2022-07-10 | End: 2022-07-10

## 2022-07-09 RX ORDER — SYRING-NEEDL,DISP,INSUL,0.3 ML 29 G X1/2"
148 SYRINGE, EMPTY DISPOSABLE MISCELLANEOUS ONCE
Status: COMPLETED | OUTPATIENT
Start: 2022-07-09 | End: 2022-07-09

## 2022-07-09 RX ADMIN — TIZANIDINE 2 MG: 2 TABLET ORAL at 05:07

## 2022-07-09 RX ADMIN — HYDROMORPHONE HYDROCHLORIDE 0.2 MG: 1 INJECTION, SOLUTION INTRAMUSCULAR; INTRAVENOUS; SUBCUTANEOUS at 06:07

## 2022-07-09 RX ADMIN — HYDROMORPHONE HYDROCHLORIDE 0.2 MG: 1 INJECTION, SOLUTION INTRAMUSCULAR; INTRAVENOUS; SUBCUTANEOUS at 12:07

## 2022-07-09 RX ADMIN — PANTOPRAZOLE SODIUM 40 MG: 40 TABLET, DELAYED RELEASE ORAL at 08:07

## 2022-07-09 RX ADMIN — INSULIN ASPART 14 UNITS: 100 INJECTION, SOLUTION INTRAVENOUS; SUBCUTANEOUS at 07:07

## 2022-07-09 RX ADMIN — GABAPENTIN 100 MG: 100 CAPSULE ORAL at 03:07

## 2022-07-09 RX ADMIN — POTASSIUM BICARBONATE 50 MEQ: 978 TABLET, EFFERVESCENT ORAL at 08:07

## 2022-07-09 RX ADMIN — TIZANIDINE 2 MG: 2 TABLET ORAL at 02:07

## 2022-07-09 RX ADMIN — ACETAMINOPHEN 650 MG: 325 TABLET ORAL at 11:07

## 2022-07-09 RX ADMIN — MAGNESIUM CITRATE 148 ML: 1.75 LIQUID ORAL at 04:07

## 2022-07-09 RX ADMIN — Medication 10 ML: at 11:07

## 2022-07-09 RX ADMIN — Medication 10 ML: at 06:07

## 2022-07-09 RX ADMIN — AMLODIPINE BESYLATE 10 MG: 10 TABLET ORAL at 08:07

## 2022-07-09 RX ADMIN — HYDROMORPHONE HYDROCHLORIDE 0.2 MG: 1 INJECTION, SOLUTION INTRAMUSCULAR; INTRAVENOUS; SUBCUTANEOUS at 02:07

## 2022-07-09 RX ADMIN — POTASSIUM BICARBONATE 50 MEQ: 978 TABLET, EFFERVESCENT ORAL at 09:07

## 2022-07-09 RX ADMIN — Medication 324 MG: at 08:07

## 2022-07-09 RX ADMIN — FUROSEMIDE 7.5 MG/HR: 10 INJECTION, SOLUTION INTRAMUSCULAR; INTRAVENOUS at 11:07

## 2022-07-09 RX ADMIN — INSULIN ASPART 2 UNITS: 100 INJECTION, SOLUTION INTRAVENOUS; SUBCUTANEOUS at 01:07

## 2022-07-09 RX ADMIN — SODIUM CHLORIDE 1 G: 1 TABLET ORAL at 09:07

## 2022-07-09 RX ADMIN — ACETAMINOPHEN 650 MG: 325 TABLET ORAL at 05:07

## 2022-07-09 RX ADMIN — EPINEPHRINE 0.04 MCG/KG/MIN: 1 INJECTION INTRAMUSCULAR; INTRAVENOUS; SUBCUTANEOUS at 06:07

## 2022-07-09 RX ADMIN — GABAPENTIN 100 MG: 100 CAPSULE ORAL at 09:07

## 2022-07-09 RX ADMIN — TRAMADOL HYDROCHLORIDE 50 MG: 50 TABLET, COATED ORAL at 08:07

## 2022-07-09 RX ADMIN — Medication 10 ML: at 12:07

## 2022-07-09 RX ADMIN — GABAPENTIN 100 MG: 100 CAPSULE ORAL at 08:07

## 2022-07-09 RX ADMIN — TRAMADOL HYDROCHLORIDE 50 MG: 50 TABLET, COATED ORAL at 04:07

## 2022-07-09 RX ADMIN — INSULIN ASPART 14 UNITS: 100 INJECTION, SOLUTION INTRAVENOUS; SUBCUTANEOUS at 11:07

## 2022-07-09 RX ADMIN — HYDROMORPHONE HYDROCHLORIDE 0.2 MG: 1 INJECTION, SOLUTION INTRAMUSCULAR; INTRAVENOUS; SUBCUTANEOUS at 09:07

## 2022-07-09 RX ADMIN — SODIUM CHLORIDE 1 G: 1 TABLET ORAL at 08:07

## 2022-07-09 RX ADMIN — INSULIN ASPART 2 UNITS: 100 INJECTION, SOLUTION INTRAVENOUS; SUBCUTANEOUS at 09:07

## 2022-07-09 RX ADMIN — TIZANIDINE 2 MG: 2 TABLET ORAL at 09:07

## 2022-07-09 RX ADMIN — POLYETHYLENE GLYCOL 3350 17 G: 17 POWDER, FOR SOLUTION ORAL at 08:07

## 2022-07-09 NOTE — SUBJECTIVE & OBJECTIVE
Interval History/Significant Events: NAEON. MAPs at 75 on 0.4 epi. Nitric weaned off.     Follow-up For: Procedure(s) (LRB):  CLOSURE, WOUND, STERNUM (N/A)  INSERTION-RIGHT VENTRICULAR ASSIST DEVICE (Right)  APPLICATION, WOUND VAC (N/A)  IRRIGATION, MEDIASTINUM    Post-Operative Day: 9 Days Post-Op    Objective:     Vital Signs (Most Recent):  Temp: 98.3 °F (36.8 °C) (07/09/22 0715)  Pulse: 100 (07/09/22 1115)  Resp: (!) 38 (07/09/22 1115)  BP: (!) 78/0 (07/09/22 1100)  SpO2: 99 % (07/09/22 1100)   Vital Signs (24h Range):  Temp:  [98.1 °F (36.7 °C)-98.5 °F (36.9 °C)] 98.3 °F (36.8 °C)  Pulse:  [] 100  Resp:  [8-40] 38  SpO2:  [95 %-100 %] 99 %  BP: (78-86)/(0) 78/0  Arterial Line BP: ()/() 85/70     Weight: 104 kg (229 lb 4.5 oz)  Body mass index is 28.66 kg/m².      Intake/Output Summary (Last 24 hours) at 7/9/2022 1123  Last data filed at 7/9/2022 1000  Gross per 24 hour   Intake 1576.58 ml   Output 3350 ml   Net -1773.42 ml       Physical Exam  Vitals reviewed.   Constitutional:       General: He is not in acute distress.  HENT:      Head: Normocephalic and atraumatic.      Nose: Nose normal.      Mouth/Throat:      Mouth: Mucous membranes are moist.   Eyes:      Pupils: Pupils are equal, round, and reactive to light.   Neck:      Comments:     Cardiovascular:      Rate and Rhythm: Normal rate and regular rhythm.      Pulses: Normal pulses.      Comments: S/p chest closure. Incision c/d/I with iodine gauze    LVAD speed 5300  Flows ~4L    L brachial art line      Pulmonary:      Effort: Pulmonary effort is normal. No respiratory distress.      Comments: On 4L NC  Abdominal:      General: Abdomen is flat. There is no distension.      Palpations: Abdomen is soft.   Musculoskeletal:      Comments: R PICC   Skin:     General: Skin is warm and dry.      Capillary Refill: Capillary refill takes less than 2 seconds.   Neurological:      General: No focal deficit present.      Mental Status: He is  alert and oriented to person, place, and time.       Lines/Drains/Airways       Peripherally Inserted Central Catheter Line  Duration             PICC Triple Lumen 07/05/22 1554 right brachial 3 days              Arterial Line  Duration             Arterial Line 06/29/22 0832 Left Brachial 10 days              Line  Duration                  VAD 06/29/22 1115 Left ventricular assist device HeartMate 3 10 days              Peripheral Intravenous Line  Duration                  Peripheral IV - Single Lumen 07/08/22 0300 20 G Anterior;Right Forearm 1 day                    Significant Labs:    CBC/Anemia Profile:  Recent Labs   Lab 07/08/22  0408 07/09/22  0355   WBC 17.30* 14.65*   HGB 7.1* 6.9*   HCT 22.6* 22.4*    388   MCV 88 88   RDW 16.0* 16.1*        Chemistries:  Recent Labs   Lab 07/08/22  0408 07/08/22  1005 07/08/22  1621 07/08/22  2155 07/09/22  0355   *  --   --   --  130*   K 3.9  4.0   < > 4.0 4.1 4.1   CL 88*  --   --   --  88*   CO2 27  --   --   --  29   BUN 38*  --   --   --  34*   CREATININE 1.5*  --   --   --  1.3   CALCIUM 9.3  --   --   --  9.0   ALBUMIN 3.2*  --   --   --  3.0*   PROT 7.0  --   --   --  6.9   BILITOT 1.5*  --   --   --  1.2*   ALKPHOS 161*  --   --   --  171*   *  --   --   --  114*   AST 59*  --   --   --  53*   MG 2.3   < > 2.2 2.3 2.3   PHOS 3.4  --   --   --  3.5    < > = values in this interval not displayed.       All pertinent labs within the past 24 hours have been reviewed.    Significant Imaging:  I have reviewed all pertinent imaging results/findings within the past 24 hours.

## 2022-07-09 NOTE — ASSESSMENT & PLAN NOTE
Endocrinology consulted for BG management.   BG goal 140-180    - Transition insulin infusion at 1 u/hr with step-down parameters.   - Novolog 14 units TIDWM and prn for BG excursions [facility] [place] (150/25) SSI.  - BG monitoring ac/hs/0200 and moderate dose correction scale.   - Hypoglycemia protocol in place.     ** Please notify Endocrine for any change and/or advance in diet**  ** Please call Endocrine for any BG related issues **    Discharge Planning:   TBD. Please notify endocrinology prior to discharge.

## 2022-07-09 NOTE — PROGRESS NOTES
07/09/2022  Ruma Li    Current provider:  Luis F Paige MD    Device interrogation:  TXP LVAD INTERROGATIONS 7/9/2022 7/9/2022 7/9/2022 7/9/2022 7/9/2022 7/9/2022 7/9/2022   Type HeartMate3 HeartMate3 HeartMate3 HeartMate3 HeartMate3 HeartMate3 HeartMate3   Flow 4.2 4.5 4.5 4.4 4.2 4.5 4.7   Speed 5300 5300 5300 5300 5300 5300 5300   PI 4.2 3.3 3.1 3 4 3.4 3   Power (Serna) 3.7 3.8 3.8 3.8 3.8 3.8 3.8   LSL 4900 4900 4900 4900 4900 4900 4900   Pulsatility - Intermittent pulse - - - Intermittent pulse -          Rounded on Kevan Queen to ensure all mechanical assist device settings (IABP or VAD) were appropriate and all parameters were within limits.  I was able to ensure all back up equipment was present, the staff had no issues, and the Perfusion Department daily rounding was complete.      For implantable VADs: Interrogation of Ventricular assist device was performed with analysis of device parameters and review of device function. I have personally reviewed the interrogation findings and agree with findings as stated.     In emergency, the nursing units have been notified to contact the perfusion department either by:  Calling [phone] from 630am to 4pm Mon thru Fri, utilizing the On-Call Finder functionality of Epic and searching for Perfusion, or by contacting the hospital  from 4pm to 630am and on weekends and asking to speak with the perfusionist on call.    5:00 PM

## 2022-07-09 NOTE — ASSESSMENT & PLAN NOTE
Titrate insulin slowly to avoid hypoglycemia as the risk of hypoglycemia increases with decreased creatinine clearance.    Estimated Creatinine Clearance: 101.6 mL/min (based on SCr of 1.3 mg/dL).

## 2022-07-09 NOTE — PROGRESS NOTES
"[facility] - [facility]  Endocrinology  Progress Note    Admit Date: 2022     Reason for Consult: Management of  type 2 DM, Hyperglycemia     Surgical Procedure and Date: none    Diabetes diagnosis year: 21    Home Diabetes Medications:  Detemir  23  units daily and Aspart   12  units TIDWM and  Mod dose correction insulin    Lab Results   Component Value Date    HGBA1C 9.2 (H) 2022           How often checking glucose at home? 1-3 x day   BG readings on regimen: 180- up to 300 once  Hypoglycemia on the regimen?  yes once- related to not really eating after taking aspart   Missed doses on regimen?  no    Diabetes Complications include:     Hyperglycemia    Complicating diabetes co morbidities:   History of MI, CHF, and CKD      HPI:   Patient is a 37 y.o. male with a diagnosis of type 2 DM and NIDCM with BiV systolic heart failure. Patient was presented at [facility] 22  and was  approved for VAD. Also recently admitted with [facility]; he had clinic appointment last week to f/u recent admit for hyperglycemic hyperosmolar syndrome but did not come as he was "feeling too bad" presents to  ED with SOB. Endocrinology consulted for BG/ DM management.                Interval HPI:   Overnight events: No acute events overnight. Patient on the SICU in room 43028 CVICU/87092 CVICU A. Blood glucose improving. BG at and above goal on current insulin regimen (Transition Insulin Drip). Steroid use- None. 9 Days Post-Op  Renal function- Normal   Vasopressors-  Epinephrine       Diet Adult Regular (IDDSI Level 7) [facility]; Consistent Carbohydrate; Fluid - 1500mL     Eatin%  Nausea: No  Hypoglycemia and intervention: No  Fever: No  TPN and/or TF: No      BP (!) 84/0 (BP Location: Left arm, Patient Position: Sitting)   Pulse 90   Temp 98.3 °F (36.8 °C) (Oral)   Resp 12   Ht 6' 3" (1.905 m)   Wt 104 kg (229 lb 4.5 oz)   SpO2 100%   BMI 28.66 kg/m²     Labs Reviewed and Include    Recent " Labs   Lab 07/09/22  0355   *   CALCIUM 9.0   ALBUMIN 3.0*   PROT 6.9   *   K 4.1   CO2 29   CL 88*   BUN 34*   CREATININE 1.3   ALKPHOS 171*   *   AST 53*   BILITOT 1.2*     Lab Results   Component Value Date    WBC 14.65 (H) 07/09/2022    HGB 6.9 (L) 07/09/2022    HCT 22.4 (L) 07/09/2022    MCV 88 07/09/2022     07/09/2022     No results for input(s): TSH, FREET4 in the last 168 hours.  Lab Results   Component Value Date    HGBA1C 9.2 (H) 05/20/2022       Nutritional status:   Body mass index is 28.66 kg/m².  Lab Results   Component Value Date    ALBUMIN 3.0 (L) 07/09/2022    ALBUMIN 3.2 (L) 07/08/2022    ALBUMIN 3.2 (L) 07/07/2022     Lab Results   Component Value Date    PREALBUMIN 12 (L) 07/07/2022    PREALBUMIN 19 (L) 06/30/2022    PREALBUMIN 36 04/18/2022       Estimated Creatinine Clearance: 101.6 mL/min (based on SCr of 1.3 mg/dL).    Accu-Checks  Recent Labs     07/07/22  1106 07/07/22  1607 07/07/22  2215 07/08/22  0134 07/08/22  0737 07/08/22  1117 07/08/22  1613 07/08/22  2152 07/09/22  0112 07/09/22  0741   POCTGLUCOSE 154* 104 162* 148* 146* 272* 117* 200* 195* 165*       Current Medications and/or Treatments Impacting Glycemic Control  Immunotherapy:    Immunosuppressants       None          Steroids:   Hormones (From admission, onward)                None          Pressors:    Autonomic Drugs (From admission, onward)                Start     Stop Route Frequency Ordered    07/06/22 1000  EPINEPHrine (ADRENALIN) 10 mg in dextrose 5 % 250 mL infusion         -- IV Continuous 07/06/22 0953          Hyperglycemia/Diabetes Medications:   Antihyperglycemics (From admission, onward)                Start     Stop Route Frequency Ordered    07/08/22 1200  insulin regular in 0.9 % NaCl 100 unit/100 mL (1 unit/mL) infusion        Question:  Enter initial dose (Units/hr):  Answer:  1    -- IV Continuous 07/08/22 1157    07/07/22 1130  insulin aspart U-100 pen 14 Units         -- SubQ  3 times daily with meals 07/07/22 0854    07/01/22 1833  insulin aspart U-100 pen 0-10 Units         -- SubQ As needed (PRN) 07/01/22 1733            ASSESSMENT and PLAN    * LVAD (left ventricular assist device) present  Managed per primary team  Avoid hypoglycemia        Uncontrolled diabetes mellitus  Endocrinology consulted for BG management.   BG goal 140-180    - Transition insulin infusion at 1 u/hr with step-down parameters.   - Novolog 14 units TIDWM and prn for BG excursions [facility] (150/25) SSI.  - BG monitoring ac/hs/0200 and moderate dose correction scale.   - Hypoglycemia protocol in place.     ** Please notify Endocrine for any change and/or advance in diet**  ** Please call Endocrine for any BG related issues **    Discharge Planning:   TBD. Please notify endocrinology prior to discharge.        Acute on chronic combined systolic and diastolic heart failure, NYHA class 4  Optimize glucose control and  avoid hypoglycemia    Managed per primary.       CKD (chronic kidney disease) stage 3, GFR 30-59 ml/min    Titrate insulin slowly to avoid hypoglycemia as the risk of hypoglycemia increases with decreased creatinine clearance.    Estimated Creatinine Clearance: 101.6 mL/min (based on SCr of 1.3 mg/dL).      Surgical wound present  Optimize BG for surgical wound healing.              Michael Wyman, DNP, FNP  Endocrinology  [facility]

## 2022-07-09 NOTE — NURSING
VINNYON. Patient OOBTC this AM.     Afebrile. HR 90-100s. MAP 75-85. SpO2 >90% on 3L Nasal cannula. CVP 19, 22, 21. Patient follows commands and moves all extremities.     Gtts:  Epinephrine 0.04 mcg/kg/min   Lasix 7.5 mg/hr   Insulin 1 unit/hr     Patient voids spontaneously via urinal. 1725cc output/shift   LVAD. HM3 in place. Speed 5300. LSL 4900. Flows: 4.5-4.7. PI 3.3-3.8. Power 3.7-3.8.     POC reviewed with patient. All questions/concerns addressed.       Skin: No skin breakdown to heel, elbows, or sacrum. Foams and SCDs in place. Weight shift assistance provided as needed. On specialty bed.

## 2022-07-09 NOTE — SUBJECTIVE & OBJECTIVE
"Interval HPI:   Overnight events: No acute events overnight. Patient on the SICU in room 02129 CVICU/48506 CVICU A. Blood glucose improving. BG at and above goal on current insulin regimen (Transition Insulin Drip). Steroid use- None. 9 Days Post-Op  Renal function- Normal   Vasopressors-  Epinephrine       Diet Adult Regular (IDDSI Level 7) [facility]; Consistent Carbohydrate; Fluid - 1500mL     Eatin%  Nausea: No  Hypoglycemia and intervention: No  Fever: No  TPN and/or TF: No      BP (!) 84/0 (BP Location: Left arm, Patient Position: Sitting)   Pulse 90   Temp 98.3 °F (36.8 °C) (Oral)   Resp 12   Ht 6' 3" (1.905 m)   Wt 104 kg (229 lb 4.5 oz)   SpO2 100%   BMI 28.66 kg/m²     Labs Reviewed and Include    Recent Labs   Lab 22  0355   *   CALCIUM 9.0   ALBUMIN 3.0*   PROT 6.9   *   K 4.1   CO2 29   CL 88*   BUN 34*   CREATININE 1.3   ALKPHOS 171*   *   AST 53*   BILITOT 1.2*     Lab Results   Component Value Date    WBC 14.65 (H) 2022    HGB 6.9 (L) 2022    HCT 22.4 (L) 2022    MCV 88 2022     2022     No results for input(s): TSH, FREET4 in the last 168 hours.  Lab Results   Component Value Date    HGBA1C 9.2 (H) 2022       Nutritional status:   Body mass index is 28.66 kg/m².  Lab Results   Component Value Date    ALBUMIN 3.0 (L) 2022    ALBUMIN 3.2 (L) 2022    ALBUMIN 3.2 (L) 2022     Lab Results   Component Value Date    PREALBUMIN 12 (L) 2022    PREALBUMIN 19 (L) 2022    PREALBUMIN 36 2022       Estimated Creatinine Clearance: 101.6 mL/min (based on SCr of 1.3 mg/dL).    Accu-Checks  Recent Labs     22  1106 22  1607 22  2215 22  0134 22  0737 22  1117 22  1613 22  2152 22  0112 22  0741   POCTGLUCOSE 154* 104 162* 148* 146* 272* 117* 200* 195* 165*       Current Medications and/or Treatments Impacting Glycemic " Control  Immunotherapy:    Immunosuppressants       None          Steroids:   Hormones (From admission, onward)                None          Pressors:    Autonomic Drugs (From admission, onward)                Start     Stop Route Frequency Ordered    07/06/22 1000  EPINEPHrine (ADRENALIN) 10 mg in dextrose 5 % 250 mL infusion         -- IV Continuous 07/06/22 0953          Hyperglycemia/Diabetes Medications:   Antihyperglycemics (From admission, onward)                Start     Stop Route Frequency Ordered    07/08/22 1200  insulin regular in 0.9 % NaCl 100 unit/100 mL (1 unit/mL) infusion        Question:  Enter initial dose (Units/hr):  Answer:  1    -- IV Continuous 07/08/22 1157    07/07/22 1130  insulin aspart U-100 pen 14 Units         -- SubQ 3 times daily with meals 07/07/22 0854    07/01/22 1833  insulin aspart U-100 pen 0-10 Units         -- SubQ As needed (PRN) 07/01/22 1829

## 2022-07-09 NOTE — SUBJECTIVE & OBJECTIVE
**Interval History: He is net negative 1.5L/24h with decreased lasix gtt to 7.5mg/hr. Remains severely hypervolemic with CVP 21 this AM and correlating neck veins assessment (appears R side may be occluded but JVD appreciated on L neck assessment to at least jawline sitting upright in chair. Epi weaned to .04.  HTS recommending aggressive diuresis and discontinuation of salt tablets.      Continuous Infusions:   sodium chloride 0.9% 5 mL/hr at 06/27/22 0055    sodium chloride 0.9% Stopped (07/05/22 1314)    EPINEPHrine 0.04 mcg/kg/min (07/09/22 1000)    furosemide (LASIX) 2 mg/mL continuous infusion (non-titrating) 7.5 mg/hr (07/09/22 1000)    insulin regular 1 units/mL infusion orderable (TRANSFER) 1 Units/hr (07/09/22 1000)    nicardipine Stopped (07/05/22 2204)    nitric oxide gas       Scheduled Meds:   acetaminophen  650 mg Oral Q6H    amLODIPine  10 mg Oral Daily    ferrous gluconate  324 mg Oral Daily with breakfast    gabapentin  100 mg Oral TID    insulin aspart U-100  14 Units Subcutaneous TIDWM    INV aspirin/placebo  100 mg Oral Daily    LIDOcaine  1 patch Transdermal Q24H    pantoprazole  40 mg Oral Daily    polyethylene glycol  17 g Oral Daily    potassium bicarbonate  50 mEq Oral BID    sodium chloride 0.9%  10 mL Intravenous Q6H    sodium chloride  1 g Oral BID    tiZANidine  2 mg Oral Q8H    warfarin  4 mg Oral Daily     PRN Meds:albumin human 5%, albuterol sulfate, bisacodyL, dextrose 10%, dextrose 10%, glucagon (human recombinant), glucose, glucose, hydrALAZINE, HYDROmorphone, insulin aspart U-100, magnesium hydroxide 400 mg/5 ml, magnesium sulfate IVPB, potassium bicarbonate, potassium bicarbonate, potassium bicarbonate, sodium chloride 0.9%, sodium chloride 0.9%, Flushing PICC Protocol **AND** sodium chloride 0.9% **AND** sodium chloride 0.9%, traMADoL    Review of patient's allergies indicates:   Allergen Reactions    Aspirin Other (See Comments)     Mr. Thacker is enrolled in Dr. Paige's  Alon Trial and cannot have any aspirin and/or aspirin-containing products. DO NOT cancel any orders for INV Aspirin 100 mg/Placebo. If you have questions, please contact Isabel @ [phone], [email or web address], secure chat or MS Teams message.    Bumex [bumetanide] Hives    Lactose Diarrhea     Other reaction(s): Abdominal distension, gaseous    Torsemide Hives     Objective:     Vital Signs (Most Recent):  Temp: 98.3 °F (36.8 °C) (07/09/22 0715)  Pulse: 90 (07/09/22 1000)  Resp: 12 (07/09/22 1000)  BP: (!) 84/0 (07/09/22 0745)  SpO2: 100 % (07/09/22 0715)   Vital Signs (24h Range):  Temp:  [98.1 °F (36.7 °C)-98.5 °F (36.9 °C)] 98.3 °F (36.8 °C)  Pulse:  [] 90  Resp:  [8-40] 12  SpO2:  [95 %-100 %] 100 %  BP: (80-88)/(0) 84/0  Arterial Line BP: ()/() 77/62     Patient Vitals for the past 72 hrs (Last 3 readings):   Weight   07/09/22 0500 104 kg (229 lb 4.5 oz)   07/08/22 1200 97.5 kg (214 lb 15.2 oz)   07/07/22 0500 97.5 kg (214 lb 15.2 oz)       Body mass index is 28.66 kg/m².      Intake/Output Summary (Last 24 hours) at 7/9/2022 1050  Last data filed at 7/9/2022 1000  Gross per 24 hour   Intake 1594.41 ml   Output 3350 ml   Net -1755.59 ml         Hemodynamic Parameters:       Telemetry: ST    Physical Exam  Vitals and nursing note reviewed.   Constitutional:       Appearance: Normal appearance.      Comments: Sitting in chair NAD   HENT:      Head: Normocephalic and atraumatic.   Eyes:      Extraocular Movements: Extraocular movements intact.      Conjunctiva/sclera: Conjunctivae normal.   Neck:      Comments: JVD elevation to jawline   Cardiovascular:      Rate and Rhythm: Regular rhythm. Tachycardia present.      Comments: Smooth VAD hum  Pulmonary:      Breath sounds: Normal breath sounds.   Abdominal:      Palpations: Abdomen is soft.   Musculoskeletal:         General: Swelling present.      Cervical back: Neck supple.      Comments: Bilateral pedal edema, now with  compression stockings in place   Skin:     General: Skin is dry.      Capillary Refill: Capillary refill takes 2 to 3 seconds.   Neurological:      Mental Status: He is alert.       Significant Labs:  CBC:  Recent Labs   Lab 07/07/22 0319 07/08/22  0408 07/09/22  0355   WBC 21.04* 17.30* 14.65*   RBC 2.58* 2.58* 2.56*   HGB 7.1* 7.1* 6.9*   HCT 22.8* 22.6* 22.4*    348 388   MCV 88 88 88   MCH 27.5 27.5 27.0   MCHC 31.1* 31.4* 30.8*       BNP:  Recent Labs   Lab 07/04/22  0301 07/06/22  0402 07/08/22  0408   * 955* 677*       CMP:  Recent Labs   Lab 07/07/22 0319 07/07/22  1607 07/08/22  0408 07/08/22  1005 07/08/22  1621 07/08/22  2155 07/09/22  0355   *  --  141*  --   --   --  202*   CALCIUM 8.7  --  9.3  --   --   --  9.0   ALBUMIN 3.2*  --  3.2*  --   --   --  3.0*   PROT 6.8  --  7.0  --   --   --  6.9   *  --  131*  --   --   --  130*   K 3.6  3.6  3.6   < > 3.9  4.0   < > 4.0 4.1 4.1   CO2 28  --  27  --   --   --  29   CL 88*  --  88*  --   --   --  88*   BUN 34*  --  38*  --   --   --  34*   CREATININE 1.3  --  1.5*  --   --   --  1.3   ALKPHOS 146*  --  161*  --   --   --  171*   *  --  157*  --   --   --  114*   AST 71*  --  59*  --   --   --  53*   BILITOT 1.5*  --  1.5*  --   --   --  1.2*    < > = values in this interval not displayed.        Coagulation:   Recent Labs   Lab 07/07/22  0524 07/08/22  0408 07/09/22  0355 07/09/22  0740   INR 2.0* 2.8*  --  3.7*   APTT 48.2* 35.3* 35.9*  --        LDH:  Recent Labs   Lab 07/07/22  0319 07/08/22  0408 07/09/22  0355   * 501* 481*       Microbiology:  Microbiology Results (last 7 days)       Procedure Component Value Units Date/Time    Blood culture [[id number]] Collected: 07/02/22 1629    Order Status: Completed Specimen: Blood from Peripheral, Hand, Right Updated: 07/07/22 1812     Blood Culture, Routine No growth after 5 days.    Blood culture [[id number]] Collected: 07/02/22 1618    Order Status: Completed  Specimen: Blood from Peripheral, Antecubital, Right Updated: 07/07/22 1812     Blood Culture, Routine No growth after 5 days.    Blood culture [[id number]] Collected: 06/30/22 1836    Order Status: Completed Specimen: Blood from Peripheral, Wrist, Left Updated: 07/05/22 2012     Blood Culture, Routine No growth after 5 days.    Blood culture [[id number]] Collected: 06/30/22 1833    Order Status: Completed Specimen: Blood from Peripheral, Forearm, Right Updated: 07/05/22 2012     Blood Culture, Routine No growth after 5 days.            I have reviewed all pertinent labs within the past 24 hours.    Estimated Creatinine Clearance: 101.6 mL/min (based on SCr of 1.3 mg/dL).    Diagnostic Results:  I have reviewed and interpreted all pertinent imaging results/findings within the past 24 hours.

## 2022-07-09 NOTE — PROGRESS NOTES
[facility]  Heart Transplant  Progress Note    Patient Name: Kevan Queen  MRN: [id number]  Admission Date: 6/13/2022  Hospital Length of Stay: 26 days  Attending Physician: Luis F Paige MD  Primary Care Provider: ORALIA Cline  Principal Problem:Acute on chronic combined systolic and diastolic congestive heart failure, NYHA class 4    Subjective:     **Interval History: He is net negative 1.5L/24h with decreased lasix gtt to 7.5mg/hr. Remains severely hypervolemic with CVP 21 this AM and correlating neck veins assessment (appears R side may be occluded but JVD appreciated on L neck assessment to at least jawline sitting upright in chair. Epi weaned to .04.  HTS recommending aggressive diuresis and discontinuation of salt tablets.      Continuous Infusions:   sodium chloride 0.9% 5 mL/hr at 06/27/22 0055    sodium chloride 0.9% Stopped (07/05/22 1314)    EPINEPHrine 0.04 mcg/kg/min (07/09/22 1000)    furosemide (LASIX) 2 mg/mL continuous infusion (non-titrating) 7.5 mg/hr (07/09/22 1000)    insulin regular 1 units/mL infusion orderable (TRANSFER) 1 Units/hr (07/09/22 1000)    nicardipine Stopped (07/05/22 2204)    nitric oxide gas       Scheduled Meds:   acetaminophen  650 mg Oral Q6H    amLODIPine  10 mg Oral Daily    ferrous gluconate  324 mg Oral Daily with breakfast    gabapentin  100 mg Oral TID    insulin aspart U-100  14 Units Subcutaneous TIDWM    INV aspirin/placebo  100 mg Oral Daily    LIDOcaine  1 patch Transdermal Q24H    pantoprazole  40 mg Oral Daily    polyethylene glycol  17 g Oral Daily    potassium bicarbonate  50 mEq Oral BID    sodium chloride 0.9%  10 mL Intravenous Q6H    sodium chloride  1 g Oral BID    tiZANidine  2 mg Oral Q8H    warfarin  4 mg Oral Daily     PRN Meds:albumin human 5%, albuterol sulfate, bisacodyL, dextrose 10%, dextrose 10%, glucagon (human recombinant), glucose, glucose, hydrALAZINE, HYDROmorphone, insulin aspart U-100,  magnesium hydroxide 400 mg/5 ml, magnesium sulfate IVPB, potassium bicarbonate, potassium bicarbonate, potassium bicarbonate, sodium chloride 0.9%, sodium chloride 0.9%, Flushing PICC Protocol **AND** sodium chloride 0.9% **AND** sodium chloride 0.9%, traMADoL    Review of patient's allergies indicates:   Allergen Reactions    Aspirin Other (See Comments)     Mr. Thacker is enrolled in Dr. Paige's Alon Trial and cannot have any aspirin and/or aspirin-containing products. DO NOT cancel any orders for INV Aspirin 100 mg/Placebo. If you have questions, please contact Isabel @ [phone], [email or web address], secure chat or MS Teams message.    Bumex [bumetanide] Hives    Lactose Diarrhea     Other reaction(s): Abdominal distension, gaseous    Torsemide Hives     Objective:     Vital Signs (Most Recent):  Temp: 98.3 °F (36.8 °C) (07/09/22 0715)  Pulse: 90 (07/09/22 1000)  Resp: 12 (07/09/22 1000)  BP: (!) 84/0 (07/09/22 0745)  SpO2: 100 % (07/09/22 0715)   Vital Signs (24h Range):  Temp:  [98.1 °F (36.7 °C)-98.5 °F (36.9 °C)] 98.3 °F (36.8 °C)  Pulse:  [] 90  Resp:  [8-40] 12  SpO2:  [95 %-100 %] 100 %  BP: (80-88)/(0) 84/0  Arterial Line BP: ()/() 77/62     Patient Vitals for the past 72 hrs (Last 3 readings):   Weight   07/09/22 0500 104 kg (229 lb 4.5 oz)   07/08/22 1200 97.5 kg (214 lb 15.2 oz)   07/07/22 0500 97.5 kg (214 lb 15.2 oz)       Body mass index is 28.66 kg/m².      Intake/Output Summary (Last 24 hours) at 7/9/2022 1050  Last data filed at 7/9/2022 1000  Gross per 24 hour   Intake 1594.41 ml   Output 3350 ml   Net -1755.59 ml         Hemodynamic Parameters:       Telemetry: ST    Physical Exam  Vitals and nursing note reviewed.   Constitutional:       Appearance: Normal appearance.      Comments: Sitting in chair NAD   HENT:      Head: Normocephalic and atraumatic.   Eyes:      Extraocular Movements: Extraocular movements intact.      Conjunctiva/sclera: Conjunctivae  normal.   Neck:      Comments: JVD elevation to jawline   Cardiovascular:      Rate and Rhythm: Regular rhythm. Tachycardia present.      Comments: Smooth VAD hum  Pulmonary:      Breath sounds: Normal breath sounds.   Abdominal:      Palpations: Abdomen is soft.   Musculoskeletal:         General: Swelling present.      Cervical back: Neck supple.      Comments: Bilateral pedal edema, now with compression stockings in place   Skin:     General: Skin is dry.      Capillary Refill: Capillary refill takes 2 to 3 seconds.   Neurological:      Mental Status: He is alert.       Significant Labs:  CBC:  Recent Labs   Lab 07/07/22  0319 07/08/22  0408 07/09/22  0355   WBC 21.04* 17.30* 14.65*   RBC 2.58* 2.58* 2.56*   HGB 7.1* 7.1* 6.9*   HCT 22.8* 22.6* 22.4*    348 388   MCV 88 88 88   MCH 27.5 27.5 27.0   MCHC 31.1* 31.4* 30.8*       BNP:  Recent Labs   Lab 07/04/22  0301 07/06/22  0402 07/08/22  0408   * 955* 677*       CMP:  Recent Labs   Lab 07/07/22  0319 07/07/22  1607 07/08/22  0408 07/08/22  1005 07/08/22  1621 07/08/22  2155 07/09/22  0355   *  --  141*  --   --   --  202*   CALCIUM 8.7  --  9.3  --   --   --  9.0   ALBUMIN 3.2*  --  3.2*  --   --   --  3.0*   PROT 6.8  --  7.0  --   --   --  6.9   *  --  131*  --   --   --  130*   K 3.6  3.6  3.6   < > 3.9  4.0   < > 4.0 4.1 4.1   CO2 28  --  27  --   --   --  29   CL 88*  --  88*  --   --   --  88*   BUN 34*  --  38*  --   --   --  34*   CREATININE 1.3  --  1.5*  --   --   --  1.3   ALKPHOS 146*  --  161*  --   --   --  171*   *  --  157*  --   --   --  114*   AST 71*  --  59*  --   --   --  53*   BILITOT 1.5*  --  1.5*  --   --   --  1.2*    < > = values in this interval not displayed.        Coagulation:   Recent Labs   Lab 07/07/22  0524 07/08/22  0408 07/09/22  0355 07/09/22  0740   INR 2.0* 2.8*  --  3.7*   APTT 48.2* 35.3* 35.9*  --        LDH:  Recent Labs   Lab 07/07/22  0319 07/08/22  0408 07/09/22  0355   *  "501* 481*       Microbiology:  Microbiology Results (last 7 days)       Procedure Component Value Units Date/Time    Blood culture [[id number]] Collected: 07/02/22 1629    Order Status: Completed Specimen: Blood from Peripheral, Hand, Right Updated: 07/07/22 1812     Blood Culture, Routine No growth after 5 days.    Blood culture [[id number]] Collected: 07/02/22 1618    Order Status: Completed Specimen: Blood from Peripheral, Antecubital, Right Updated: 07/07/22 1812     Blood Culture, Routine No growth after 5 days.    Blood culture [[id number]] Collected: 06/30/22 1836    Order Status: Completed Specimen: Blood from Peripheral, Wrist, Left Updated: 07/05/22 2012     Blood Culture, Routine No growth after 5 days.    Blood culture [[id number]] Collected: 06/30/22 1833    Order Status: Completed Specimen: Blood from Peripheral, Forearm, Right Updated: 07/05/22 2012     Blood Culture, Routine No growth after 5 days.            I have reviewed all pertinent labs within the past 24 hours.    Estimated Creatinine Clearance: 101.6 mL/min (based on SCr of 1.3 mg/dL).    Diagnostic Results:  I have reviewed and interpreted all pertinent imaging results/findings within the past 24 hours.    Assessment and Plan:     38 yo male with NIDCM with BiV systolic heart failure, on home Milrinone at 0.375 mcg/kg/min, presented at [facility] 4/2/22 ,was not evaluated for OHT as he has recently quit smoking in April 2022 but was approved for VAD with plan to begin OHT evaluation in upcoming months if Mr Queen is stable and suitable for OHT eval (blood group A), 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 Went to ED at [facility] 6/10 but left after waiting 4 hours. Had clinic appointment with us today, but arrived to [facility] early this morning and decided to go to the ED instead. Baseline Lasix dose is 80 mg bid. Reports taking 240 mg qd past 3 days with no improvement. BNP is 1701, up from 898 on 6/2 and 49 on 5/24. sCr is 1.8 with baseline ~ 1.5-1.7. sPO2 on RA is 93%. Wife at bedside    He has been given Lasix 80 mg IVP in the ED with plan to start Lasix gtt at 20 mg/hr           * Acute on chronic combined systolic and diastolic congestive heart failure, NYHA class 4  - [facility]  - Was not evaluated for OHTx as he quit smoking in April 2022.   - Received HM3 on 6/29  - See LVAD      Hyponatremia  -hypervolemic  -Improvement in Na with diuresis, recommend continued diuresis and discontinuation of salt tabs in setting of volume overload    LVAD (left ventricular assist device) present  -S/P DT HM3 with MVR plus Protek Duo for RV support 6/29 (Grecia)  -Rvad removed with concern for hemolysis, Marcella increased to 20,  -Post op antibiotic per CTS (h/o Staph epi bacteremia, cleareed by ID for surgery with rec to continue antibiotic coverage for 2 weeks post op)  -AC GASTON trial   -Speed set at 5300 (increased to 5300 on 7/6)   -Echo done 7/5 EF: 10%, LVEDD: 7.13 with speed at 5200.  Would repeat echo in couple days after speed change   -Would increase lasix drip (+ consider  adding metolazone/tolvaptan ).  - off.  Would consider Primacor if needed for inotropic support   -AC per primary. INR is supratherapeutic today.    Procedure: Device Interrogation Including analysis of device parameters  Current Settings: Ventricular Assist Device  Review of device function is stable    TXP LVAD INTERROGATIONS 7/9/2022 7/9/2022 7/9/2022 7/9/2022 7/9/2022 7/9/2022 7/9/2022   Type HeartMate3 HeartMate3 HeartMate3 HeartMate3 HeartMate3 HeartMate3 HeartMate3   Flow 4.7 4.5 4.4 4.9 4.5 4.6 4.6   Speed 5300 5300 5300 5300 5300 5300 5300   PI 3 3.0 3.5 3.6  3.7 3.8 3.7   Power (Serna) 3.8 3.8 3.8 3.8 3.8 3.8 3.8   LSL 4900 4900 4900 4900 4900 4900 4900   Pulsatility - - - Intermittent pulse - - -       Staphylococcus epidermidis bacteremia  -Blood cultures 6/20 and repeat 6/21 positive for Staph Epi. One of two blood cultures from 6/23 positive for Staph (taken prior to line exchange). Repeat cultures sent 6/25 NGTD.   -IJ exchanged on 6/23, IABP exchanged 6/23  -ID recommended 14 day course of vancomycin from VAD implantation on 6/29 with end date: 7/12/22. Vancomycin discontinued per CTS with concerns for elevated sCr. ID with new recs to complete 7d course of daptomycin, which was completed 7/7/22.        Uncontrolled diabetes mellitus  Admitted 5/24-5/27 with hyperglycemia hyperosmolar syndrome  -Hgb A1C 9.2 on 5/20/22  -Endocrine following, on insulin gtt    CKD (chronic kidney disease) stage 3, GFR 30-59 ml/min  SCr baseline ~ 1.5-1.7    Uninterrupted Critical Care/Counseling Time (not including procedures): 60 minutes      Lupe Villavicencio PA-C  Heart Transplant  [facility]

## 2022-07-09 NOTE — ASSESSMENT & PLAN NOTE
-S/P DT HM3 with MVR plus Protek Duo for RV support 6/29 (Grecia)  -Rvad removed with concern for hemolysis, Marcella increased to 20,  -Post op antibiotic per CTS (h/o Staph epi bacteremia, cleareed by ID for surgery with rec to continue antibiotic coverage for 2 weeks post op)  -AC GASTON trial   -Speed set at 5300 (increased to 5300 on 7/6)   -Echo done 7/5 EF: 10%, LVEDD: 7.13 with speed at 5200.  Would repeat echo in couple days after speed change   -Would increase lasix drip (+ consider  adding metolazone/tolvaptan ).  - off.  Would consider Primacor if needed for inotropic support   -AC per primary. INR is supratherapeutic today.    Procedure: Device Interrogation Including analysis of device parameters  Current Settings: Ventricular Assist Device  Review of device function is stable    TXP LVAD INTERROGATIONS 7/9/2022 7/9/2022 7/9/2022 7/9/2022 7/9/2022 7/9/2022 7/9/2022   Type HeartMate3 HeartMate3 HeartMate3 HeartMate3 HeartMate3 HeartMate3 HeartMate3   Flow 4.7 4.5 4.4 4.9 4.5 4.6 4.6   Speed 5300 5300 5300 5300 5300 5300 5300   PI 3 3.0 3.5 3.6 3.7 3.8 3.7   Power (Serna) 3.8 3.8 3.8 3.8 3.8 3.8 3.8   LSL 4900 4900 4900 4900 4900 4900 4900   Pulsatility - - - Intermittent pulse - - -

## 2022-07-09 NOTE — PLAN OF CARE
Significant events:  Epinephrine  maintained at 0.04 mcg/kg/min due to BP. OOBTC ~7 hrs. Insulin stopped and evening scheduled SQ dose held due to BG 97.   Vitals/Respiratory:  2 L NC.  O2: >86%.   HR: 's, sinus/sinus tachycardia.  MAP: 75-85.  CVP: 21.  Temperature max:  98.3 F.   Gtts:  Epinephrine at 0.04 mcg/kg/min and Lasix at 7.5 mg/hr.  UOP: 1.2 L per urinal.       Last Bowel Movement: 7/5/22.   LVAD: HM3. Speed 5300.  Low speed 4900.  Flows: 4.2-4.9.  PI: 3-4.2.  Power: 3.7-3.9.  Dressing change performed with significant other, Niharika, at bedside. Teaching on dressing change performed.   Neuro: Pupils equal and reactive. AAOx4, follows commands, and moves all extremities purposefully.     Diet:  Regular. 1.5 L fluid restriction.   Labs/Accuchecks:   K and mag Q6 hrs. ACHS.      Plan:   Wean Epi by 0.01 QD if BP tolerates. Wean oxygen as tolerated. Continue ICU care. Plan of care reviewed with patient and significant other, all questions answered.     Skin:  Midsternal incision covered with island border, CDI. LVAD exit site a 2 on assessment. Weight shifting assistance provide Q2 hrs. Sacral foam, chair cushion, and Immerse mattress utilized. No new skin breakdown noted.

## 2022-07-09 NOTE — PROGRESS NOTES
[facility]  Critical Care - Surgery  Progress Note    Patient Name: Kevan Queen  MRN: [id number]  Admission Date: 6/13/2022  Hospital Length of Stay: 26 days  Code Status: Full Code  Attending Provider: Luis F Paige MD  Primary Care Provider: ORALIA Cline   Principal Problem: Acute on chronic combined systolic and diastolic congestive heart failure, NYHA class 4    Subjective:     Hospital/ICU Course:  No notes on file    Interval History/Significant Events: NAEON. MAPs at 75 on 0.4 epi. Nitric weaned off.     Follow-up For: Procedure(s) (LRB):  CLOSURE, WOUND, STERNUM (N/A)  INSERTION-RIGHT VENTRICULAR ASSIST DEVICE (Right)  APPLICATION, WOUND VAC (N/A)  IRRIGATION, MEDIASTINUM    Post-Operative Day: 9 Days Post-Op    Objective:     Vital Signs (Most Recent):  Temp: 98.3 °F (36.8 °C) (07/09/22 0715)  Pulse: 100 (07/09/22 1115)  Resp: (!) 38 (07/09/22 1115)  BP: (!) 78/0 (07/09/22 1100)  SpO2: 99 % (07/09/22 1100)   Vital Signs (24h Range):  Temp:  [98.1 °F (36.7 °C)-98.5 °F (36.9 °C)] 98.3 °F (36.8 °C)  Pulse:  [] 100  Resp:  [8-40] 38  SpO2:  [95 %-100 %] 99 %  BP: (78-86)/(0) 78/0  Arterial Line BP: ()/() 85/70     Weight: 104 kg (229 lb 4.5 oz)  Body mass index is 28.66 kg/m².      Intake/Output Summary (Last 24 hours) at 7/9/2022 1123  Last data filed at 7/9/2022 1000  Gross per 24 hour   Intake 1576.58 ml   Output 3350 ml   Net -1773.42 ml       Physical Exam  Vitals reviewed.   Constitutional:       General: He is not in acute distress.  HENT:      Head: Normocephalic and atraumatic.      Nose: Nose normal.      Mouth/Throat:      Mouth: Mucous membranes are moist.   Eyes:      Pupils: Pupils are equal, round, and reactive to light.   Neck:      Comments:     Cardiovascular:      Rate and Rhythm: Normal rate and regular rhythm.      Pulses: Normal pulses.      Comments: S/p chest closure. Incision c/d/I with iodine gauze    LVAD speed 5300  Flows ~4L    L  brachial art line      Pulmonary:      Effort: Pulmonary effort is normal. No respiratory distress.      Comments: On 4L NC  Abdominal:      General: Abdomen is flat. There is no distension.      Palpations: Abdomen is soft.   Musculoskeletal:      Comments: R PICC   Skin:     General: Skin is warm and dry.      Capillary Refill: Capillary refill takes less than 2 seconds.   Neurological:      General: No focal deficit present.      Mental Status: He is alert and oriented to person, place, and time.       Lines/Drains/Airways       Peripherally Inserted Central Catheter Line  Duration             PICC Triple Lumen 07/05/22 1554 right brachial 3 days              Arterial Line  Duration             Arterial Line 06/29/22 0832 Left Brachial 10 days              Line  Duration                  VAD 06/29/22 1115 Left ventricular assist device HeartMate 3 10 days              Peripheral Intravenous Line  Duration                  Peripheral IV - Single Lumen 07/08/22 0300 20 G Anterior;Right Forearm 1 day                    Significant Labs:    CBC/Anemia Profile:  Recent Labs   Lab 07/08/22  0408 07/09/22  0355   WBC 17.30* 14.65*   HGB 7.1* 6.9*   HCT 22.6* 22.4*    388   MCV 88 88   RDW 16.0* 16.1*        Chemistries:  Recent Labs   Lab 07/08/22  0408 07/08/22  1005 07/08/22  1621 07/08/22  2155 07/09/22  0355   *  --   --   --  130*   K 3.9  4.0   < > 4.0 4.1 4.1   CL 88*  --   --   --  88*   CO2 27  --   --   --  29   BUN 38*  --   --   --  34*   CREATININE 1.5*  --   --   --  1.3   CALCIUM 9.3  --   --   --  9.0   ALBUMIN 3.2*  --   --   --  3.0*   PROT 7.0  --   --   --  6.9   BILITOT 1.5*  --   --   --  1.2*   ALKPHOS 161*  --   --   --  171*   *  --   --   --  114*   AST 59*  --   --   --  53*   MG 2.3   < > 2.2 2.3 2.3   PHOS 3.4  --   --   --  3.5    < > = values in this interval not displayed.       All pertinent labs within the past 24 hours have been reviewed.    Significant Imaging:  I  have reviewed all pertinent imaging results/findings within the past 24 hours.    Assessment/Plan:     * Acute on chronic combined systolic and diastolic congestive heart failure, NYHA class 4  Kevan Queen is a 37yoM with a history of polysubstance abuse who presented to the hospital with decompensated heart failure. He underwent LVAD, RVAD and MVr on 6/29/22. He is admitted to the ICU post-operatively.       Neuro/Psych:   -- Sedation: none  -- Pain: Added scheduled gabapentin, tizanidine, lidocaine patch; PRN Tramadol and dilaudid for breakthough  with improvement in pain control     Cards:   -- s/p LVAD, RVAD insertion and MVr. RVAD removed 7/1  -- HDS on epi 0.04, dobutamine off; Will continue 0.04 today.  -- s/p chest closure 6/30   -- MAP goal 75-85  -- Off cardene, PRN hydral available, continue norvasc      Pulm:   -- Goal O2 sat > 90%  -- ABG PRN  -- Extubated 7/1  -- Off nitric  -- daily CXR      Renal:  -- Trend BUN/Cr   -- lasix gtt at 7.5  -- Goal net negative under 1L       FEN / GI:   -- Replace lytes as needed  -- Nutrition: cardiac  -- GI ppx: famotidine  -- Bowel reg: miralax  -- Salt tabs in setting of hyponatremia, improving  -- Hepatology consult to acute liver failure, appreciate recs. Liver enzymes near normal.      ID:   -- Tm:afebrile; WBC stable  -- Dapto completed  -- Blood Cx from 7/2, remain negative      Heme/Onc:   -- H/H stable   -- Daily CBC  -- no intra-op product administered  -- 2u autologous  -- 1u pRBC, 1u FFP, 500mL albumin overnight (6/29)  -- Heparin gtt off, Hold warfarin today given INR      Endo:   -- BG goal 140-180  -- SSI   -- endocrine consulted      PPx:   Feeding: cardiac  Analgesia/Sedation:none / PRN oxy,Fentanyl  Thromboembolic prevention: SCDs,   HOB >30: Yes  Stress Ulcer ppx: famotidine  Glucose control: Critical care goal 140-180 g/dl, ISS     Lines/Drains/Airway: R PICC, L brachial A-line; LVAD      Dispo/Code Status/Palliative:   -- SICU / Full  Code.       Michael Quinn MD  Critical Care - Surgery  [facility]

## 2022-07-09 NOTE — ASSESSMENT & PLAN NOTE
Kevan Queen is a 37yoM with a history of polysubstance abuse who presented to the hospital with decompensated heart failure. He underwent LVAD, RVAD and MVr on 6/29/22. He is admitted to the ICU post-operatively.       Neuro/Psych:   -- Sedation: none  -- Pain: Added scheduled gabapentin, tizanidine, lidocaine patch; PRN Tramadol and dilaudid for breakthough  with improvement in pain control     Cards:   -- s/p LVAD, RVAD insertion and MVr. RVAD removed 7/1  -- HDS on epi 0.04, dobutamine off; Will continue 0.04 today.  -- s/p chest closure 6/30   -- MAP goal 75-85  -- Off cardene, PRN hydral available, continue norvasc      Pulm:   -- Goal O2 sat > 90%  -- ABG PRN  -- Extubated 7/1  -- Off nitric  -- daily CXR      Renal:  -- Trend BUN/Cr   -- lasix gtt at 7.5  -- Goal net negative under 1L       FEN / GI:   -- Replace lytes as needed  -- Nutrition: cardiac  -- GI ppx: famotidine  -- Bowel reg: miralax  -- Salt tabs in setting of hyponatremia, improving  -- Hepatology consult to acute liver failure, appreciate recs. Liver enzymes near normal.      ID:   -- Tm:afebrile; WBC stable  -- Dapto completed  -- Blood Cx from 7/2, remain negative      Heme/Onc:   -- H/H stable   -- Daily CBC  -- no intra-op product administered  -- 2u autologous  -- 1u pRBC, 1u FFP, 500mL albumin overnight (6/29)  -- Heparin gtt off, Hold warfarin today given INR      Endo:   -- BG goal 140-180  -- SSI   -- endocrine consulted      PPx:   Feeding: cardiac  Analgesia/Sedation:none / PRN oxy,Fentanyl  Thromboembolic prevention: SCDs,   HOB >30: Yes  Stress Ulcer ppx: famotidine  Glucose control: Critical care goal 140-180 g/dl, ISS     Lines/Drains/Airway: R PICC, L brachial A-line; LVAD      Dispo/Code Status/Palliative:   -- SICU / Full Code.

## 2022-07-10 LAB
ALBUMIN SERPL BCP-MCNC: 3 G/DL (ref 3.5–5.2)
ALLENS TEST: ABNORMAL
ALP SERPL-CCNC: 167 U/L (ref 55–135)
ALT SERPL W/O P-5'-P-CCNC: 92 U/L (ref 10–44)
ANION GAP SERPL CALC-SCNC: 10 MMOL/L (ref 8–16)
APTT BLDCRRT: 34.5 SEC (ref 21–32)
AST SERPL-CCNC: 48 U/L (ref 10–40)
BASOPHILS # BLD AUTO: 0.04 K/UL (ref 0–0.2)
BASOPHILS NFR BLD: 0.3 % (ref 0–1.9)
BILIRUB SERPL-MCNC: 1 MG/DL (ref 0.1–1)
BUN SERPL-MCNC: 30 MG/DL (ref 6–20)
CALCIUM SERPL-MCNC: 9.1 MG/DL (ref 8.7–10.5)
CHLORIDE SERPL-SCNC: 89 MMOL/L (ref 95–110)
CO2 SERPL-SCNC: 31 MMOL/L (ref 23–29)
CREAT SERPL-MCNC: 1.3 MG/DL (ref 0.5–1.4)
DELSYS: ABNORMAL
DIFFERENTIAL METHOD: ABNORMAL
EOSINOPHIL # BLD AUTO: 0.1 K/UL (ref 0–0.5)
EOSINOPHIL NFR BLD: 0.9 % (ref 0–8)
ERYTHROCYTE [DISTWIDTH] IN BLOOD BY AUTOMATED COUNT: 16.4 % (ref 11.5–14.5)
EST. GFR  (AFRICAN AMERICAN): >60 ML/MIN/1.73 M^2
EST. GFR  (NON AFRICAN AMERICAN): >60 ML/MIN/1.73 M^2
FIBRINOGEN PPP-MCNC: 663 MG/DL (ref 182–400)
GLUCOSE SERPL-MCNC: 217 MG/DL (ref 70–110)
HCO3 UR-SCNC: 32 MMOL/L (ref 24–28)
HCT VFR BLD AUTO: 23.2 % (ref 40–54)
HGB BLD-MCNC: 7 G/DL (ref 14–18)
IMM GRANULOCYTES # BLD AUTO: 0.07 K/UL (ref 0–0.04)
IMM GRANULOCYTES NFR BLD AUTO: 0.5 % (ref 0–0.5)
INR PPP: 2.4 (ref 0.8–1.2)
LDH SERPL L TO P-CCNC: 439 U/L (ref 110–260)
LYMPHOCYTES # BLD AUTO: 1.9 K/UL (ref 1–4.8)
LYMPHOCYTES NFR BLD: 13.1 % (ref 18–48)
MAGNESIUM SERPL-MCNC: 2.1 MG/DL (ref 1.6–2.6)
MAGNESIUM SERPL-MCNC: 2.2 MG/DL (ref 1.6–2.6)
MAGNESIUM SERPL-MCNC: 2.3 MG/DL (ref 1.6–2.6)
MAGNESIUM SERPL-MCNC: 2.4 MG/DL (ref 1.6–2.6)
MCH RBC QN AUTO: 26.6 PG (ref 27–31)
MCHC RBC AUTO-ENTMCNC: 30.2 G/DL (ref 32–36)
MCV RBC AUTO: 88 FL (ref 82–98)
MONOCYTES # BLD AUTO: 1.3 K/UL (ref 0.3–1)
MONOCYTES NFR BLD: 8.9 % (ref 4–15)
NEUTROPHILS # BLD AUTO: 10.8 K/UL (ref 1.8–7.7)
NEUTROPHILS NFR BLD: 76.3 % (ref 38–73)
NRBC BLD-RTO: 1 /100 WBC
PCO2 BLDA: 54.1 MMHG (ref 35–45)
PH SMN: 7.38 [PH] (ref 7.35–7.45)
PHOSPHATE SERPL-MCNC: 3.6 MG/DL (ref 2.7–4.5)
PLATELET # BLD AUTO: 419 K/UL (ref 150–450)
PMV BLD AUTO: 10.2 FL (ref 9.2–12.9)
PO2 BLDA: 27 MMHG (ref 40–60)
POC BE: 7 MMOL/L
POC SATURATED O2: 48 % (ref 95–100)
POC TCO2: 34 MMOL/L (ref 24–29)
POCT GLUCOSE: 143 MG/DL (ref 70–110)
POCT GLUCOSE: 152 MG/DL (ref 70–110)
POCT GLUCOSE: 188 MG/DL (ref 70–110)
POCT GLUCOSE: 222 MG/DL (ref 70–110)
POCT GLUCOSE: 239 MG/DL (ref 70–110)
POTASSIUM SERPL-SCNC: 3.7 MMOL/L (ref 3.5–5.1)
POTASSIUM SERPL-SCNC: 3.8 MMOL/L (ref 3.5–5.1)
POTASSIUM SERPL-SCNC: 4 MMOL/L (ref 3.5–5.1)
POTASSIUM SERPL-SCNC: 4.1 MMOL/L (ref 3.5–5.1)
POTASSIUM SERPL-SCNC: 4.6 MMOL/L (ref 3.5–5.1)
PROT SERPL-MCNC: 6.7 G/DL (ref 6–8.4)
PROTHROMBIN TIME: 23.8 SEC (ref 9–12.5)
RBC # BLD AUTO: 2.63 M/UL (ref 4.6–6.2)
SAMPLE: ABNORMAL
SITE: ABNORMAL
SODIUM SERPL-SCNC: 130 MMOL/L (ref 136–145)
WBC # BLD AUTO: 14.13 K/UL (ref 3.9–12.7)

## 2022-07-10 PROCEDURE — 94761 N-INVAS EAR/PLS OXIMETRY MLT: CPT

## 2022-07-10 PROCEDURE — 97530 THERAPEUTIC ACTIVITIES: CPT

## 2022-07-10 PROCEDURE — 63600175 PHARM REV CODE 636 W HCPCS: Performed by: NURSE PRACTITIONER

## 2022-07-10 PROCEDURE — 25000003 PHARM REV CODE 250

## 2022-07-10 PROCEDURE — 94799 UNLISTED PULMONARY SVC/PX: CPT

## 2022-07-10 PROCEDURE — 27000248 HC VAD-ADDITIONAL DAY

## 2022-07-10 PROCEDURE — 99900035 HC TECH TIME PER 15 MIN (STAT)

## 2022-07-10 PROCEDURE — C9399 UNCLASSIFIED DRUGS OR BIOLOG: HCPCS | Performed by: NURSE PRACTITIONER

## 2022-07-10 PROCEDURE — 84100 ASSAY OF PHOSPHORUS: CPT

## 2022-07-10 PROCEDURE — 20000000 HC ICU ROOM

## 2022-07-10 PROCEDURE — 82803 BLOOD GASES ANY COMBINATION: CPT

## 2022-07-10 PROCEDURE — 84132 ASSAY OF SERUM POTASSIUM: CPT

## 2022-07-10 PROCEDURE — 63600175 PHARM REV CODE 636 W HCPCS

## 2022-07-10 PROCEDURE — 99291 PR CRITICAL CARE, E/M 30-74 MINUTES: ICD-10-PCS | Mod: ,,, | Performed by: ANESTHESIOLOGY

## 2022-07-10 PROCEDURE — 99291 CRITICAL CARE FIRST HOUR: CPT | Mod: ,,, | Performed by: ANESTHESIOLOGY

## 2022-07-10 PROCEDURE — 25000003 PHARM REV CODE 250: Performed by: THORACIC SURGERY (CARDIOTHORACIC VASCULAR SURGERY)

## 2022-07-10 PROCEDURE — 63600175 PHARM REV CODE 636 W HCPCS: Performed by: STUDENT IN AN ORGANIZED HEALTH CARE EDUCATION/TRAINING PROGRAM

## 2022-07-10 PROCEDURE — 83735 ASSAY OF MAGNESIUM: CPT

## 2022-07-10 PROCEDURE — 80053 COMPREHEN METABOLIC PANEL: CPT

## 2022-07-10 PROCEDURE — 85384 FIBRINOGEN ACTIVITY: CPT | Performed by: STUDENT IN AN ORGANIZED HEALTH CARE EDUCATION/TRAINING PROGRAM

## 2022-07-10 PROCEDURE — 99232 SBSQ HOSP IP/OBS MODERATE 35: CPT | Mod: ,,, | Performed by: NURSE PRACTITIONER

## 2022-07-10 PROCEDURE — 99233 SBSQ HOSP IP/OBS HIGH 50: CPT | Mod: ,,, | Performed by: INTERNAL MEDICINE

## 2022-07-10 PROCEDURE — A4216 STERILE WATER/SALINE, 10 ML: HCPCS | Performed by: THORACIC SURGERY (CARDIOTHORACIC VASCULAR SURGERY)

## 2022-07-10 PROCEDURE — 99233 PR SUBSEQUENT HOSPITAL CARE,LEVL III: ICD-10-PCS | Mod: ,,, | Performed by: INTERNAL MEDICINE

## 2022-07-10 PROCEDURE — 99232 PR SUBSEQUENT HOSPITAL CARE,LEVL II: ICD-10-PCS | Mod: ,,, | Performed by: NURSE PRACTITIONER

## 2022-07-10 PROCEDURE — 25000003 PHARM REV CODE 250: Performed by: STUDENT IN AN ORGANIZED HEALTH CARE EDUCATION/TRAINING PROGRAM

## 2022-07-10 PROCEDURE — 97110 THERAPEUTIC EXERCISES: CPT

## 2022-07-10 PROCEDURE — 85025 COMPLETE CBC W/AUTO DIFF WBC: CPT | Performed by: THORACIC SURGERY (CARDIOTHORACIC VASCULAR SURGERY)

## 2022-07-10 PROCEDURE — 27000221 HC OXYGEN, UP TO 24 HOURS

## 2022-07-10 PROCEDURE — 25000003 PHARM REV CODE 250: Performed by: ANESTHESIOLOGY

## 2022-07-10 PROCEDURE — 25000003 PHARM REV CODE 250: Performed by: NURSE PRACTITIONER

## 2022-07-10 PROCEDURE — 97535 SELF CARE MNGMENT TRAINING: CPT

## 2022-07-10 PROCEDURE — 85730 THROMBOPLASTIN TIME PARTIAL: CPT | Performed by: STUDENT IN AN ORGANIZED HEALTH CARE EDUCATION/TRAINING PROGRAM

## 2022-07-10 PROCEDURE — 83615 LACTATE (LD) (LDH) ENZYME: CPT | Performed by: THORACIC SURGERY (CARDIOTHORACIC VASCULAR SURGERY)

## 2022-07-10 PROCEDURE — 85610 PROTHROMBIN TIME: CPT | Performed by: THORACIC SURGERY (CARDIOTHORACIC VASCULAR SURGERY)

## 2022-07-10 PROCEDURE — 93750 INTERROGATION VAD IN PERSON: CPT | Mod: ,,, | Performed by: INTERNAL MEDICINE

## 2022-07-10 PROCEDURE — 93750 PR INTERROGATE VENT ASSIST DEV, IN PERSON, W PHYSICIAN ANALYSIS: ICD-10-PCS | Mod: ,,, | Performed by: INTERNAL MEDICINE

## 2022-07-10 RX ORDER — INSULIN ASPART 100 [IU]/ML
1-10 INJECTION, SOLUTION INTRAVENOUS; SUBCUTANEOUS
Status: DISCONTINUED | OUTPATIENT
Start: 2022-07-10 | End: 2022-07-28 | Stop reason: HOSPADM

## 2022-07-10 RX ORDER — GLUCAGON 1 MG
1 KIT INJECTION
Status: DISCONTINUED | OUTPATIENT
Start: 2022-07-10 | End: 2022-07-28 | Stop reason: HOSPADM

## 2022-07-10 RX ORDER — IBUPROFEN 200 MG
16 TABLET ORAL
Status: DISCONTINUED | OUTPATIENT
Start: 2022-07-10 | End: 2022-07-28 | Stop reason: HOSPADM

## 2022-07-10 RX ORDER — INSULIN ASPART 100 [IU]/ML
8 INJECTION, SOLUTION INTRAVENOUS; SUBCUTANEOUS
Status: DISCONTINUED | OUTPATIENT
Start: 2022-07-10 | End: 2022-07-13

## 2022-07-10 RX ORDER — ACETAMINOPHEN 325 MG/1
650 TABLET ORAL EVERY 8 HOURS
Status: DISCONTINUED | OUTPATIENT
Start: 2022-07-10 | End: 2022-07-28 | Stop reason: HOSPADM

## 2022-07-10 RX ORDER — OXYMETAZOLINE HCL 0.05 %
2 SPRAY, NON-AEROSOL (ML) NASAL 2 TIMES DAILY
Status: DISPENSED | OUTPATIENT
Start: 2022-07-10 | End: 2022-07-13

## 2022-07-10 RX ORDER — WARFARIN 4 MG/1
4 TABLET ORAL DAILY
Status: DISCONTINUED | OUTPATIENT
Start: 2022-07-10 | End: 2022-07-11

## 2022-07-10 RX ORDER — IBUPROFEN 200 MG
24 TABLET ORAL
Status: DISCONTINUED | OUTPATIENT
Start: 2022-07-10 | End: 2022-07-28 | Stop reason: HOSPADM

## 2022-07-10 RX ORDER — MIDODRINE HYDROCHLORIDE 5 MG/1
5 TABLET ORAL 3 TIMES DAILY
Status: DISCONTINUED | OUTPATIENT
Start: 2022-07-10 | End: 2022-07-11

## 2022-07-10 RX ADMIN — OXYMETAZOLINE HCL 2 SPRAY: 0.05 SPRAY NASAL at 11:07

## 2022-07-10 RX ADMIN — MIDODRINE HYDROCHLORIDE 5 MG: 5 TABLET ORAL at 09:07

## 2022-07-10 RX ADMIN — ACETAMINOPHEN 650 MG: 325 TABLET ORAL at 05:07

## 2022-07-10 RX ADMIN — FUROSEMIDE 7.5 MG/HR: 10 INJECTION, SOLUTION INTRAMUSCULAR; INTRAVENOUS at 03:07

## 2022-07-10 RX ADMIN — TRAMADOL HYDROCHLORIDE 50 MG: 50 TABLET, COATED ORAL at 08:07

## 2022-07-10 RX ADMIN — PANTOPRAZOLE SODIUM 40 MG: 40 TABLET, DELAYED RELEASE ORAL at 08:07

## 2022-07-10 RX ADMIN — GABAPENTIN 100 MG: 100 CAPSULE ORAL at 09:07

## 2022-07-10 RX ADMIN — INSULIN ASPART 2 UNITS: 100 INJECTION, SOLUTION INTRAVENOUS; SUBCUTANEOUS at 08:07

## 2022-07-10 RX ADMIN — POTASSIUM BICARBONATE 50 MEQ: 978 TABLET, EFFERVESCENT ORAL at 09:07

## 2022-07-10 RX ADMIN — INSULIN ASPART 1 UNITS: 100 INJECTION, SOLUTION INTRAVENOUS; SUBCUTANEOUS at 09:07

## 2022-07-10 RX ADMIN — INSULIN ASPART 10 UNITS: 100 INJECTION, SOLUTION INTRAVENOUS; SUBCUTANEOUS at 08:07

## 2022-07-10 RX ADMIN — INSULIN ASPART 8 UNITS: 100 INJECTION, SOLUTION INTRAVENOUS; SUBCUTANEOUS at 05:07

## 2022-07-10 RX ADMIN — INSULIN ASPART 10 UNITS: 100 INJECTION, SOLUTION INTRAVENOUS; SUBCUTANEOUS at 11:07

## 2022-07-10 RX ADMIN — Medication 10 ML: at 12:07

## 2022-07-10 RX ADMIN — TIZANIDINE 2 MG: 2 TABLET ORAL at 05:07

## 2022-07-10 RX ADMIN — ACETAMINOPHEN 650 MG: 325 TABLET ORAL at 01:07

## 2022-07-10 RX ADMIN — EPINEPHRINE 0.04 MCG/KG/MIN: 1 INJECTION INTRAMUSCULAR; INTRAVENOUS; SUBCUTANEOUS at 06:07

## 2022-07-10 RX ADMIN — Medication 324 MG: at 08:07

## 2022-07-10 RX ADMIN — GABAPENTIN 100 MG: 100 CAPSULE ORAL at 08:07

## 2022-07-10 RX ADMIN — HYDROMORPHONE HYDROCHLORIDE 0.2 MG: 1 INJECTION, SOLUTION INTRAMUSCULAR; INTRAVENOUS; SUBCUTANEOUS at 04:07

## 2022-07-10 RX ADMIN — ACETAMINOPHEN 650 MG: 325 TABLET ORAL at 09:07

## 2022-07-10 RX ADMIN — POLYETHYLENE GLYCOL 3350 17 G: 17 POWDER, FOR SOLUTION ORAL at 08:07

## 2022-07-10 RX ADMIN — HYDROMORPHONE HYDROCHLORIDE 0.2 MG: 1 INJECTION, SOLUTION INTRAMUSCULAR; INTRAVENOUS; SUBCUTANEOUS at 09:07

## 2022-07-10 RX ADMIN — WARFARIN SODIUM 4 MG: 4 TABLET ORAL at 05:07

## 2022-07-10 RX ADMIN — MIDODRINE HYDROCHLORIDE 5 MG: 5 TABLET ORAL at 02:07

## 2022-07-10 RX ADMIN — TIZANIDINE 2 MG: 2 TABLET ORAL at 01:07

## 2022-07-10 RX ADMIN — GABAPENTIN 100 MG: 100 CAPSULE ORAL at 02:07

## 2022-07-10 RX ADMIN — Medication 10 ML: at 05:07

## 2022-07-10 RX ADMIN — INSULIN DETEMIR 15 UNITS: 100 INJECTION, SOLUTION SUBCUTANEOUS at 09:07

## 2022-07-10 RX ADMIN — POTASSIUM BICARBONATE 50 MEQ: 978 TABLET, EFFERVESCENT ORAL at 08:07

## 2022-07-10 RX ADMIN — LIDOCAINE 1 PATCH: 50 PATCH CUTANEOUS at 11:07

## 2022-07-10 RX ADMIN — SODIUM CHLORIDE 1 G: 1 TABLET ORAL at 09:07

## 2022-07-10 RX ADMIN — HYDROMORPHONE HYDROCHLORIDE 0.2 MG: 1 INJECTION, SOLUTION INTRAMUSCULAR; INTRAVENOUS; SUBCUTANEOUS at 10:07

## 2022-07-10 RX ADMIN — INSULIN ASPART 4 UNITS: 100 INJECTION, SOLUTION INTRAVENOUS; SUBCUTANEOUS at 03:07

## 2022-07-10 RX ADMIN — POTASSIUM BICARBONATE 50 MEQ: 978 TABLET, EFFERVESCENT ORAL at 05:07

## 2022-07-10 RX ADMIN — TRAMADOL HYDROCHLORIDE 50 MG: 50 TABLET, COATED ORAL at 05:07

## 2022-07-10 RX ADMIN — SODIUM CHLORIDE 1 G: 1 TABLET ORAL at 08:07

## 2022-07-10 RX ADMIN — Medication 10 ML: at 06:07

## 2022-07-10 RX ADMIN — TIZANIDINE 2 MG: 2 TABLET ORAL at 09:07

## 2022-07-10 NOTE — ASSESSMENT & PLAN NOTE
Endocrinology consulted for BG management.   BG goal 140-180    Discontinue IV insulin infusion. Start levemir 15 units HS.   - decrease to Novolog 8 units TIDWM and prn for BG excursions [facility] [place] (150/25) SSI.  - BG monitoring ac/hs and moderate dose correction scale.   - Hypoglycemia protocol in place.     ** Please notify Endocrine for any change and/or advance in diet**  ** Please call Endocrine for any BG related issues **    Discharge Planning:   TBD. Please notify endocrinology prior to discharge.

## 2022-07-10 NOTE — PLAN OF CARE
GRISELDA. Patient OOBTC this AM.      Afebrile. HR 80-90s. MAP 75-85. SpO2 >90% on 2L nasal cannula. CVP 24 & 22. Patient follows commands and moves all extremities.      Gtts:  Epinephrine 0.04 mcg/kg/min   Lasix 7.5 mg/hr      Patient voids spontaneously via urinal. 2275 cc output/shift   LVAD. HM3 in place. Speed 5300. LSL 4900. Flows: 4.5-4.7. PI 2.9-3.6. Power 3.8-3.9.      POC reviewed with patient. All questions/concerns addressed.         Skin: No skin breakdown to heel, elbows, or sacrum. Foams and SCDs in place. Weight shift assistance provided as needed. On specialty bed

## 2022-07-10 NOTE — ASSESSMENT & PLAN NOTE
-S/P DT HM3 with MVR plus Protek Duo for RV support 6/29 (Grecia)  -Rvad removed with concern for hemolysis, Marcella increased to 20,  -Post op antibiotic per CTS (h/o Staph epi bacteremia, cleareed by ID for surgery with rec to continue antibiotic coverage for 2 weeks post op)  -AC GASTON trial   -Speed set at 5300 (increased to 5300 on 7/6)   -Echo done 7/5 EF: 10%, LVEDD: 7.13 with speed at 5200.  Would repeat echo in couple days after speed change   -Would increase lasix drip (+ consider  adding metolazone/tolvaptan ).  - off.  Would consider Primacor if needed for inotropic support   -AC per primary.    Procedure: Device Interrogation Including analysis of device parameters  Current Settings: Ventricular Assist Device  Review of device function is stable    TXP LVAD INTERROGATIONS 7/10/2022 7/10/2022 7/10/2022 7/10/2022 7/10/2022 7/10/2022 7/10/2022   Type HeartMate3 HeartMate3 HeartMate3 HeartMate3 HeartMate3 HeartMate3 HeartMate3   Flow 4.6 4.7 4.6 4.7 4.9 4.8 4.5   Speed 5300 5300 5300 5300 5300 5300 5300   PI 3.6 3.1 3.3 2.9 2.9 2.9 3.6   Power (Serna) 3.8 3.8 3.8 3.8 3.9 3.8 3.8   LSL 4900 4900 4900 4900 4900 4900 4900   Pulsatility - - Intermittent pulse - - - -

## 2022-07-10 NOTE — SUBJECTIVE & OBJECTIVE
"Interval HPI:   Overnight events: Remains in ICU; 34913 CVICU/96753 CVICU A. BG at or slightly below goal; insulin infusion suspended per order overnight. LVAD. 10 Days Post-Op  Eating:  Diet Adult Regular (IDDSI Level 7) [facility]; Consistent Carbohydrate; Fluid - 1500mL   50%  Nausea: No  Hypoglycemia and intervention: No  Fever: No  TPN and/or TF: No      BP (!) 84/0 (BP Location: Left arm, Patient Position: Lying)   Pulse 91   Temp 98.3 °F (36.8 °C) (Oral)   Resp (!) 9   Ht 6' 3" (1.905 m)   Wt 105 kg (231 lb 7.7 oz)   SpO2 96%   BMI 28.93 kg/m²     Labs Reviewed and Include    Recent Labs   Lab 07/10/22  0343 07/10/22  1033   *  --    CALCIUM 9.1  --    ALBUMIN 3.0*  --    PROT 6.7  --    *  --    K 3.8  3.7 4.6   CO2 31*  --    CL 89*  --    BUN 30*  --    CREATININE 1.3  --    ALKPHOS 167*  --    ALT 92*  --    AST 48*  --    BILITOT 1.0  --      Lab Results   Component Value Date    WBC 14.13 (H) 07/10/2022    HGB 7.0 (L) 07/10/2022    HCT 23.2 (L) 07/10/2022    MCV 88 07/10/2022     07/10/2022     No results for input(s): TSH, FREET4 in the last 168 hours.  Lab Results   Component Value Date    HGBA1C 9.2 (H) 05/20/2022       Nutritional status:   Body mass index is 28.93 kg/m².  Lab Results   Component Value Date    ALBUMIN 3.0 (L) 07/10/2022    ALBUMIN 3.0 (L) 07/09/2022    ALBUMIN 3.2 (L) 07/08/2022     Lab Results   Component Value Date    PREALBUMIN 12 (L) 07/07/2022    PREALBUMIN 19 (L) 06/30/2022    PREALBUMIN 36 04/18/2022       Estimated Creatinine Clearance: 102 mL/min (based on SCr of 1.3 mg/dL).    Accu-Checks  Recent Labs     07/09/22  0741 07/09/22  1117 07/09/22  1555 07/09/22  1556 07/09/22  1748 07/09/22  1748 07/09/22  2122 07/10/22  0342 07/10/22  0800 07/10/22  1134   POCTGLUCOSE 165* 173* 92 108 64* 97 201* 239* 222* 143*       Current Medications and/or Treatments Impacting Glycemic Control  Immunotherapy:    Immunosuppressants       None      "     Steroids:   Hormones (From admission, onward)                None          Pressors:    Autonomic Drugs (From admission, onward)                Start     Stop Route Frequency Ordered    07/10/22 1500  midodrine tablet 5 mg         -- Oral 3 times daily 07/10/22 1045    07/06/22 1000  EPINEPHrine (ADRENALIN) 10 mg in dextrose 5 % 250 mL infusion         -- IV Continuous 07/06/22 0953          Hyperglycemia/Diabetes Medications:   Antihyperglycemics (From admission, onward)                Start     Stop Route Frequency Ordered    07/10/22 0715  insulin aspart U-100 pen 10 Units         -- SubQ 3 times daily with meals 07/09/22 1803    07/08/22 1200  insulin regular in 0.9 % NaCl 100 unit/100 mL (1 unit/mL) infusion        Question:  Enter initial dose (Units/hr):  Answer:  1    -- IV Continuous 07/08/22 1157    07/01/22 1833  insulin aspart U-100 pen 0-10 Units         -- SubQ As needed (PRN) 07/01/22 9456

## 2022-07-10 NOTE — ASSESSMENT & PLAN NOTE
Titrate insulin slowly to avoid hypoglycemia as the risk of hypoglycemia increases with decreased creatinine clearance.    Estimated Creatinine Clearance: 102 mL/min (based on SCr of 1.3 mg/dL).

## 2022-07-10 NOTE — ASSESSMENT & PLAN NOTE
- NIDCM  - Was not evaluated for OHTx as he quit smoking in April 2022.   - Received HM3 on 6/29  - Hypervolemic this week on titrating lasix gtt. Recommend increasing lasix gtt for more aggressive diuresis and consider addition of metolazone/tolvaptan)  - See LVAD

## 2022-07-10 NOTE — PROGRESS NOTES
"[facility] - Surgical Intensive Care  Endocrinology  Progress Note    Admit Date: 6/13/2022     Reason for Consult: Management of  type 2 DM, Hyperglycemia     Surgical Procedure and Date: none    Diabetes diagnosis year: 4/21/21    Home Diabetes Medications:  Detemir  23  units daily and Aspart   12  units TIDWM and  Mod dose correction insulin    Lab Results   Component Value Date    HGBA1C 9.2 (H) 05/20/2022           How often checking glucose at home? 1-3 x day   BG readings on regimen: 180- up to 300 once  Hypoglycemia on the regimen?  yes once- related to not really eating after taking aspart   Missed doses on regimen?  no    Diabetes Complications include:     Hyperglycemia    Complicating diabetes co morbidities:   History of MI, CHF, and CKD      HPI:   Patient is a 37 y.o. male with a diagnosis of type 2 DM and NIDCM with BiV systolic heart failure. Patient was presented at [facility] 4/2/[facility]  and was  approved for VAD. Also recently admitted with [facility]; he had clinic appointment last week to f/u recent admit for hyperglycemic hyperosmolar syndrome but did not come as he was "feeling too bad" presents to  ED with SOB. Endocrinology consulted for BG/ DM management.                Interval HPI:   Overnight events: Remains in ICU; 59753 CVICU/38910 CVICU A. BG at or slightly below goal; insulin infusion suspended per order overnight. LVAD. 10 Days Post-Op  Eating:  Diet Adult Regular (IDDSI Level 7) [facility]; Consistent Carbohydrate; Fluid - 1500mL   50%  Nausea: No  Hypoglycemia and intervention: No  Fever: No  TPN and/or TF: No      BP (!) 84/0 (BP Location: Left arm, Patient Position: Lying)   Pulse 91   Temp 98.3 °F (36.8 °C) (Oral)   Resp (!) 9   Ht 6' 3" (1.905 m)   Wt 105 kg (231 lb 7.7 oz)   SpO2 96%   BMI 28.93 kg/m²     Labs Reviewed and Include    Recent Labs   Lab 07/10/22  0343 07/10/22  1033   *  --    CALCIUM 9.1  --    ALBUMIN 3.0*  --    PROT 6.7  --    *  --    K 3.8  " 3.7 4.6   CO2 31*  --    CL 89*  --    BUN 30*  --    CREATININE 1.3  --    ALKPHOS 167*  --    ALT 92*  --    AST 48*  --    BILITOT 1.0  --      Lab Results   Component Value Date    WBC 14.13 (H) 07/10/2022    HGB 7.0 (L) 07/10/2022    HCT 23.2 (L) 07/10/2022    MCV 88 07/10/2022     07/10/2022     No results for input(s): TSH, FREET4 in the last 168 hours.  Lab Results   Component Value Date    HGBA1C 9.2 (H) 05/20/2022       Nutritional status:   Body mass index is 28.93 kg/m².  Lab Results   Component Value Date    ALBUMIN 3.0 (L) 07/10/2022    ALBUMIN 3.0 (L) 07/09/2022    ALBUMIN 3.2 (L) 07/08/2022     Lab Results   Component Value Date    PREALBUMIN 12 (L) 07/07/2022    PREALBUMIN 19 (L) 06/30/2022    PREALBUMIN 36 04/18/2022       Estimated Creatinine Clearance: 102 mL/min (based on SCr of 1.3 mg/dL).    Accu-Checks  Recent Labs     07/09/22  0741 07/09/22  1117 07/09/22  1555 07/09/22  1556 07/09/22  1748 07/09/22  1748 07/09/22  2122 07/10/22  0342 07/10/22  0800 07/10/22  1134   POCTGLUCOSE 165* 173* 92 108 64* 97 201* 239* 222* 143*       Current Medications and/or Treatments Impacting Glycemic Control  Immunotherapy:    Immunosuppressants       None          Steroids:   Hormones (From admission, onward)                None          Pressors:    Autonomic Drugs (From admission, onward)                Start     Stop Route Frequency Ordered    07/10/22 1500  midodrine tablet 5 mg         -- Oral 3 times daily 07/10/22 1045    07/06/22 1000  EPINEPHrine (ADRENALIN) 10 mg in dextrose 5 % 250 mL infusion         -- IV Continuous 07/06/22 0953          Hyperglycemia/Diabetes Medications:   Antihyperglycemics (From admission, onward)                Start     Stop Route Frequency Ordered    07/10/22 0715  insulin aspart U-100 pen 10 Units         -- SubQ 3 times daily with meals 07/09/22 1803    07/08/22 1200  insulin regular in 0.9 % NaCl 100 unit/100 mL (1 unit/mL) infusion        Question:  Enter  initial dose (Units/hr):  Answer:  1    -- IV Continuous 07/08/22 1157    07/01/22 1833  insulin aspart U-100 pen 0-10 Units         -- SubQ As needed (PRN) 07/01/22 1733            ASSESSMENT and PLAN    * Acute on chronic combined systolic and diastolic congestive heart failure, NYHA class 4  Optimize glucose control and  avoid hypoglycemia    Managed per primary.       Uncontrolled diabetes mellitus  Endocrinology consulted for BG management.   BG goal 140-180    Discontinue IV insulin infusion. Start levemir 15 units HS.   - decrease to Novolog 8 units TIDWM and prn for BG excursions [facility] (150/25) SSI.  - BG monitoring ac/hs and moderate dose correction scale.   - Hypoglycemia protocol in place.     ** Please notify Endocrine for any change and/or advance in diet**  ** Please call Endocrine for any BG related issues **    Discharge Planning:   TBD. Please notify endocrinology prior to discharge.        LVAD (left ventricular assist device) present  Managed per primary team  Avoid hypoglycemia        Surgical wound present  Optimize BG for surgical wound healing.         CKD (chronic kidney disease) stage 3, GFR 30-59 ml/min    Titrate insulin slowly to avoid hypoglycemia as the risk of hypoglycemia increases with decreased creatinine clearance.    Estimated Creatinine Clearance: 102 mL/min (based on SCr of 1.3 mg/dL).          Yumiko Fowler NP  Endocrinology  [facility] - [facility]

## 2022-07-10 NOTE — SUBJECTIVE & OBJECTIVE
**Interval History: Remains hypervolemic. Net negative 1.7L/24hr on lasix gtt at 7.5mg/hr and epi .04. Recommend increasing lasix gtt for more aggressive diuresis. CVP 24 this AM, SVO2 48, CO/CI 7.7/3.3. Discussed recs with CTS resident.      Continuous Infusions:   sodium chloride 0.9% 5 mL/hr at 06/27/22 0055    sodium chloride 0.9% Stopped (07/05/22 1314)    EPINEPHrine 0.04 mcg/kg/min (07/10/22 0900)    furosemide (LASIX) 2 mg/mL continuous infusion (non-titrating) 7.5 mg/hr (07/10/22 0900)    insulin regular 1 units/mL infusion orderable (TRANSFER) Stopped (07/09/22 1749)    nicardipine Stopped (07/05/22 2204)     Scheduled Meds:   acetaminophen  650 mg Oral Q8H    amLODIPine  10 mg Oral Daily    ferrous gluconate  324 mg Oral Daily with breakfast    gabapentin  100 mg Oral TID    insulin aspart U-100  10 Units Subcutaneous TIDWM    INV aspirin/placebo  100 mg Oral Daily    LIDOcaine  1 patch Transdermal Q24H    pantoprazole  40 mg Oral Daily    polyethylene glycol  17 g Oral Daily    potassium bicarbonate  50 mEq Oral BID    sodium chloride 0.9%  10 mL Intravenous Q6H    sodium chloride  1 g Oral BID    tiZANidine  2 mg Oral Q8H     PRN Meds:albumin human 5%, albuterol sulfate, bisacodyL, dextrose 10%, dextrose 10%, glucagon (human recombinant), glucose, glucose, hydrALAZINE, HYDROmorphone, insulin aspart U-100, magnesium hydroxide 400 mg/5 ml, magnesium sulfate IVPB, potassium bicarbonate, potassium bicarbonate, potassium bicarbonate, sodium chloride 0.9%, sodium chloride 0.9%, Flushing PICC Protocol **AND** sodium chloride 0.9% **AND** sodium chloride 0.9%, traMADoL    Review of patient's allergies indicates:   Allergen Reactions    Aspirin Other (See Comments)     Mr. Thacker is enrolled in Dr. Paige's Alon Trial and cannot have any aspirin and/or aspirin-containing products. DO NOT cancel any orders for INV Aspirin 100 mg/Placebo. If you have questions, please contact Isabel @ [phone],  [phone],[email or web address], secure chat or MS Teams message.    Bumex [bumetanide] Hives    Lactose Diarrhea     Other reaction(s): Abdominal distension, gaseous    Torsemide Hives     Objective:     Vital Signs (Most Recent):  Temp: 98.4 °F (36.9 °C) (07/10/22 0300)  Pulse: 91 (07/10/22 0945)  Resp: 15 (07/10/22 0945)  BP: (!) 74/0 (07/10/22 0830)  SpO2: 100 % (07/10/22 0800)   Vital Signs (24h Range):  Temp:  [97.9 °F (36.6 °C)-98.4 °F (36.9 °C)] 98.4 °F (36.9 °C)  Pulse:  [] 91  Resp:  [9-43] 15  SpO2:  [88 %-100 %] 100 %  BP: (74-86)/(0) 74/0  Arterial Line BP: ()/() 82/67     Patient Vitals for the past 72 hrs (Last 3 readings):   Weight   07/10/22 0400 105 kg (231 lb 7.7 oz)   07/09/22 0500 104 kg (229 lb 4.5 oz)   07/08/22 1200 97.5 kg (214 lb 15.2 oz)       Body mass index is 28.93 kg/m².      Intake/Output Summary (Last 24 hours) at 7/10/2022 1031  Last data filed at 7/10/2022 0900  Gross per 24 hour   Intake 1652.75 ml   Output 3305 ml   Net -1652.25 ml         Hemodynamic Parameters:       Telemetry: ST    Physical Exam  Vitals and nursing note reviewed.   Constitutional:       Appearance: Normal appearance.      Comments: Sitting in chair NAD   HENT:      Head: Normocephalic and atraumatic.   Eyes:      Extraocular Movements: Extraocular movements intact.      Conjunctiva/sclera: Conjunctivae normal.   Neck:      Comments: JVP visible at level of ear sitting upright in chair  Cardiovascular:      Rate and Rhythm: Regular rhythm. Tachycardia present.      Comments: Smooth VAD hum  Pulmonary:      Breath sounds: Normal breath sounds.   Abdominal:      Palpations: Abdomen is soft.   Musculoskeletal:         General: Swelling present.      Cervical back: Neck supple.      Comments: Bilateral pedal edema, now with compression stockings in place   Skin:     General: Skin is dry.      Capillary Refill: Capillary refill takes 2 to 3 seconds.   Neurological:      Mental Status: He is  alert.       Significant Labs:  CBC:  Recent Labs   Lab 07/08/22  0408 07/09/22  0355 07/10/22  0343   WBC 17.30* 14.65* 14.13*   RBC 2.58* 2.56* 2.63*   HGB 7.1* 6.9* 7.0*   HCT 22.6* 22.4* 23.2*    388 419   MCV 88 88 88   MCH 27.5 27.0 26.6*   MCHC 31.4* 30.8* 30.2*       BNP:  Recent Labs   Lab 07/04/22  0301 07/06/22  0402 07/08/22 0408   * 955* 677*       CMP:  Recent Labs   Lab 07/08/22  0408 07/08/22  1005 07/09/22  0355 07/09/22  1115 07/09/22  1630 07/09/22  2123 07/10/22  0343   *  --  202*  --   --   --  217*   CALCIUM 9.3  --  9.0  --   --   --  9.1   ALBUMIN 3.2*  --  3.0*  --   --   --  3.0*   PROT 7.0  --  6.9  --   --   --  6.7   *  --  130*  --   --   --  130*   K 3.9  4.0   < > 4.1   < > 4.4 4.3 3.8  3.7   CO2 27  --  29  --   --   --  31*   CL 88*  --  88*  --   --   --  89*   BUN 38*  --  34*  --   --   --  30*   CREATININE 1.5*  --  1.3  --   --   --  1.3   ALKPHOS 161*  --  171*  --   --   --  167*   *  --  114*  --   --   --  92*   AST 59*  --  53*  --   --   --  48*   BILITOT 1.5*  --  1.2*  --   --   --  1.0    < > = values in this interval not displayed.        Coagulation:   Recent Labs   Lab 07/08/22 0408 07/09/22 0355 07/09/22  0740 07/10/22  0343   INR 2.8*  --  3.7* 2.4*   APTT 35.3* 35.9*  --  34.5*       LDH:  Recent Labs   Lab 07/08/22  0408 07/09/22  0355 07/10/22  0343   * 481* 439*       Microbiology:  Microbiology Results (last 7 days)       Procedure Component Value Units Date/Time    Blood culture [[id number]] Collected: 07/02/22 1629    Order Status: Completed Specimen: Blood from Peripheral, Hand, Right Updated: 07/07/22 1812     Blood Culture, Routine No growth after 5 days.    Blood culture [[id number]] Collected: 07/02/22 1618    Order Status: Completed Specimen: Blood from Peripheral, Antecubital, Right Updated: 07/07/22 1812     Blood Culture, Routine No growth after 5 days.    Blood culture [[id number]] Collected: 06/30/22  1836    Order Status: Completed Specimen: Blood from Peripheral, Wrist, Left Updated: 07/05/22 2012     Blood Culture, Routine No growth after 5 days.    Blood culture [[id number]] Collected: 06/30/22 1833    Order Status: Completed Specimen: Blood from Peripheral, Forearm, Right Updated: 07/05/22 2012     Blood Culture, Routine No growth after 5 days.            I have reviewed all pertinent labs within the past 24 hours.    Estimated Creatinine Clearance: 102 mL/min (based on SCr of 1.3 mg/dL).    Diagnostic Results:  I have reviewed and interpreted all pertinent imaging results/findings within the past 24 hours.

## 2022-07-10 NOTE — SUBJECTIVE & OBJECTIVE
Interval History/Significant Events: NAEON. HDS on 0.04 epi. Stable chest pain.     Follow-up For: Procedure(s) (LRB):  CLOSURE, WOUND, STERNUM (N/A)  INSERTION-RIGHT VENTRICULAR ASSIST DEVICE (Right)  APPLICATION, WOUND VAC (N/A)  IRRIGATION, MEDIASTINUM    Post-Operative Day: 10 Days Post-Op    Objective:     Vital Signs (Most Recent):  Temp: 98 °F (36.7 °C) (07/10/22 0800)  Pulse: 92 (07/10/22 1030)  Resp: 14 (07/10/22 1038)  BP: (!) 74/0 (07/10/22 0830)  SpO2: 100 % (07/10/22 0800)   Vital Signs (24h Range):  Temp:  [97.9 °F (36.6 °C)-98.4 °F (36.9 °C)] 98 °F (36.7 °C)  Pulse:  [] 92  Resp:  [9-43] 14  SpO2:  [88 %-100 %] 100 %  BP: (74-86)/(0) 74/0  Arterial Line BP: ()/() 86/71     Weight: 105 kg (231 lb 7.7 oz)  Body mass index is 28.93 kg/m².      Intake/Output Summary (Last 24 hours) at 7/10/2022 1047  Last data filed at 7/10/2022 0900  Gross per 24 hour   Intake 1652.75 ml   Output 3305 ml   Net -1652.25 ml       Physical Exam  Vitals reviewed.   Constitutional:       General: He is not in acute distress.  HENT:      Head: Normocephalic and atraumatic.      Nose: Nose normal.      Mouth/Throat:      Mouth: Mucous membranes are moist.   Eyes:      Pupils: Pupils are equal, round, and reactive to light.   Neck:      Comments:     Cardiovascular:      Rate and Rhythm: Normal rate and regular rhythm.      Pulses: Normal pulses.      Comments: S/p chest closure. Incision c/d/I with iodine gauze    LVAD speed 5300  Flows ~4L    L brachial art line      Pulmonary:      Effort: Pulmonary effort is normal. No respiratory distress.      Comments: On 3L NC  Abdominal:      General: Abdomen is flat. There is no distension.      Palpations: Abdomen is soft.   Musculoskeletal:      Comments: R PICC   Skin:     General: Skin is warm and dry.      Capillary Refill: Capillary refill takes less than 2 seconds.   Neurological:      General: No focal deficit present.      Mental Status: He is alert and  oriented to person, place, and time.       Vents:  Vent Mode: Spont (07/01/22 1205)  Ventilator Initiated: Yes (06/29/22 1527)  Set Rate: 18 BPM (07/01/22 0737)  Vt Set: 580 mL (07/01/22 0737)  Pressure Support: 8 cmH20 (07/01/22 1205)  PEEP/CPAP: 5 cmH20 (07/01/22 1205)  Oxygen Concentration (%): 36 (07/05/22 0328)  Peak Airway Pressure: 15 cmH2O (07/01/22 1205)  Plateau Pressure: 21 cmH20 (07/01/22 1205)  Total Ve: 17 mL (07/01/22 1205)  Negative Inspiratory Force (cm H2O): -13 (07/01/22 1205)  F/VT Ratio<105 (RSBI): (!) 70.48 (07/01/22 1205)    Lines/Drains/Airways       Peripherally Inserted Central Catheter Line  Duration             PICC Triple Lumen 07/05/22 1554 right brachial 4 days              Arterial Line  Duration             Arterial Line 06/29/22 0832 Left Brachial 11 days              Line  Duration                  VAD 06/29/22 1115 Left ventricular assist device HeartMate 3 10 days              Peripheral Intravenous Line  Duration                  Peripheral IV - Single Lumen 07/08/22 0300 20 G Anterior;Right Forearm 2 days                    Significant Labs:    CBC/Anemia Profile:  Recent Labs   Lab 07/09/22  0355 07/10/22  0343   WBC 14.65* 14.13*   HGB 6.9* 7.0*   HCT 22.4* 23.2*    419   MCV 88 88   RDW 16.1* 16.4*        Chemistries:  Recent Labs   Lab 07/09/22  0355 07/09/22  1115 07/09/22  1630 07/09/22  2123 07/10/22  0343   *  --   --   --  130*   K 4.1   < > 4.4 4.3 3.8  3.7   CL 88*  --   --   --  89*   CO2 29  --   --   --  31*   BUN 34*  --   --   --  30*   CREATININE 1.3  --   --   --  1.3   CALCIUM 9.0  --   --   --  9.1   ALBUMIN 3.0*  --   --   --  3.0*   PROT 6.9  --   --   --  6.7   BILITOT 1.2*  --   --   --  1.0   ALKPHOS 171*  --   --   --  167*   *  --   --   --  92*   AST 53*  --   --   --  48*   MG 2.3   < > 2.4 2.5 2.4   PHOS 3.5  --   --   --  3.6    < > = values in this interval not displayed.       All pertinent labs within the past 24 hours have  been reviewed.    Significant Imaging:  I have reviewed all pertinent imaging results/findings within the past 24 hours.

## 2022-07-10 NOTE — PROGRESS NOTES
[facility]  Critical Care - Surgery  Progress Note    Patient Name: Kevan Queen  MRN: [id number]  Admission Date: 6/13/2022  Hospital Length of Stay: 27 days  Code Status: Full Code  Attending Provider: Luis F Paige MD  Primary Care Provider: ORALIA Cline   Principal Problem: Acute on chronic combined systolic and diastolic congestive heart failure, NYHA class 4    Subjective:     Hospital/ICU Course:  No notes on file    Interval History/Significant Events: NAEON. HDS on 0.04 epi. Stable chest pain.     Follow-up For: Procedure(s) (LRB):  CLOSURE, WOUND, STERNUM (N/A)  INSERTION-RIGHT VENTRICULAR ASSIST DEVICE (Right)  APPLICATION, WOUND VAC (N/A)  IRRIGATION, MEDIASTINUM    Post-Operative Day: 10 Days Post-Op    Objective:     Vital Signs (Most Recent):  Temp: 98 °F (36.7 °C) (07/10/22 0800)  Pulse: 92 (07/10/22 1030)  Resp: 14 (07/10/22 1038)  BP: (!) 74/0 (07/10/22 0830)  SpO2: 100 % (07/10/22 0800)   Vital Signs (24h Range):  Temp:  [97.9 °F (36.6 °C)-98.4 °F (36.9 °C)] 98 °F (36.7 °C)  Pulse:  [] 92  Resp:  [9-43] 14  SpO2:  [88 %-100 %] 100 %  BP: (74-86)/(0) 74/0  Arterial Line BP: ()/() 86/71     Weight: 105 kg (231 lb 7.7 oz)  Body mass index is 28.93 kg/m².      Intake/Output Summary (Last 24 hours) at 7/10/2022 1047  Last data filed at 7/10/2022 0900  Gross per 24 hour   Intake 1652.75 ml   Output 3305 ml   Net -1652.25 ml       Physical Exam  Vitals reviewed.   Constitutional:       General: He is not in acute distress.  HENT:      Head: Normocephalic and atraumatic.      Nose: Nose normal.      Mouth/Throat:      Mouth: Mucous membranes are moist.   Eyes:      Pupils: Pupils are equal, round, and reactive to light.   Neck:      Comments:     Cardiovascular:      Rate and Rhythm: Normal rate and regular rhythm.      Pulses: Normal pulses.      Comments: S/p chest closure. Incision c/d/I with iodine gauze    LVAD speed 5300  Flows ~4L    L brachial art  line      Pulmonary:      Effort: Pulmonary effort is normal. No respiratory distress.      Comments: On 3L NC  Abdominal:      General: Abdomen is flat. There is no distension.      Palpations: Abdomen is soft.   Musculoskeletal:      Comments: R PICC   Skin:     General: Skin is warm and dry.      Capillary Refill: Capillary refill takes less than 2 seconds.   Neurological:      General: No focal deficit present.      Mental Status: He is alert and oriented to person, place, and time.       Vents:  Vent Mode: Spont (07/01/22 1205)  Ventilator Initiated: Yes (06/29/22 1527)  Set Rate: 18 BPM (07/01/22 0737)  Vt Set: 580 mL (07/01/22 0737)  Pressure Support: 8 cmH20 (07/01/22 1205)  PEEP/CPAP: 5 cmH20 (07/01/22 1205)  Oxygen Concentration (%): 36 (07/05/22 0328)  Peak Airway Pressure: 15 cmH2O (07/01/22 1205)  Plateau Pressure: 21 cmH20 (07/01/22 1205)  Total Ve: 17 mL (07/01/22 1205)  Negative Inspiratory Force (cm H2O): -13 (07/01/22 1205)  F/VT Ratio<105 (RSBI): (!) 70.48 (07/01/22 1205)    Lines/Drains/Airways       Peripherally Inserted Central Catheter Line  Duration             PICC Triple Lumen 07/05/22 1554 right brachial 4 days              Arterial Line  Duration             Arterial Line 06/29/22 0832 Left Brachial 11 days              Line  Duration                  VAD 06/29/22 1115 Left ventricular assist device HeartMate 3 10 days              Peripheral Intravenous Line  Duration                  Peripheral IV - Single Lumen 07/08/22 0300 20 G Anterior;Right Forearm 2 days                    Significant Labs:    CBC/Anemia Profile:  Recent Labs   Lab 07/09/22  0355 07/10/22  0343   WBC 14.65* 14.13*   HGB 6.9* 7.0*   HCT 22.4* 23.2*    419   MCV 88 88   RDW 16.1* 16.4*        Chemistries:  Recent Labs   Lab 07/09/22  0355 07/09/22  1115 07/09/22  1630 07/09/22  2123 07/10/22  0343   *  --   --   --  130*   K 4.1   < > 4.4 4.3 3.8  3.7   CL 88*  --   --   --  89*   CO2 29  --   --   --   31*   BUN 34*  --   --   --  30*   CREATININE 1.3  --   --   --  1.3   CALCIUM 9.0  --   --   --  9.1   ALBUMIN 3.0*  --   --   --  3.0*   PROT 6.9  --   --   --  6.7   BILITOT 1.2*  --   --   --  1.0   ALKPHOS 171*  --   --   --  167*   *  --   --   --  92*   AST 53*  --   --   --  48*   MG 2.3   < > 2.4 2.5 2.4   PHOS 3.5  --   --   --  3.6    < > = values in this interval not displayed.       All pertinent labs within the past 24 hours have been reviewed.    Significant Imaging:  I have reviewed all pertinent imaging results/findings within the past 24 hours.    Assessment/Plan:     * Acute on chronic combined systolic and diastolic congestive heart failure, NYHA class 4  Kevan Queen is a 37yoM with a history of polysubstance abuse who presented to the hospital with decompensated heart failure. He underwent LVAD, RVAD and MVr on 6/29/22. He is admitted to the ICU post-operatively.       Neuro/Psych:   -- Sedation: none  -- Pain: Added scheduled gabapentin, tizanidine, lidocaine patch; PRN Tramadol and dilaudid for breakthough  with improvement in pain control     Cards:   -- s/p LVAD, RVAD insertion and MVr. RVAD removed 7/1  -- HDS on epi 0.04, dobutamine off; Decrease to 0.03 today.  -- Adding midodrine 5mg TID  -- s/p chest closure 6/30   -- MAP goal 70-85  -- Off cardene, PRN hydral available, continue norvasc      Pulm:   -- Goal O2 sat > 90%  -- ABG PRN  -- Extubated 7/1  -- Off nitric  -- daily CXR      Renal:  -- Trend BUN/Cr   -- lasix gtt at 7.5  -- Goal net negative under 1L       FEN / GI:   -- Replace lytes as needed  -- Nutrition: cardiac  -- GI ppx: famotidine  -- Bowel reg: miralax  -- Salt tabs in setting of hyponatremia, improving  -- Hepatology consult to acute liver failure, appreciate recs. Liver enzymes near normal.      ID:   -- Tm:afebrile; WBC stable  -- Dapto completed  -- Blood Cx from 7/2, remain negative      Heme/Onc:   -- H/H stable   -- Daily CBC  -- no intra-op  product administered  -- 2u autologous  -- 1u pRBC, 1u FFP, 500mL albumin overnight (6/29)  -- Heparin gtt off, Hold warfarin today given INR      Endo:   -- BG goal 140-180  -- SSI   -- endocrine consulted      PPx:   Feeding: cardiac  Analgesia/Sedation:none / PRN oxy,Fentanyl  Thromboembolic prevention: SCDs,   HOB >30: Yes  Stress Ulcer ppx: famotidine  Glucose control: Critical care goal 140-180 g/dl, ISS     Lines/Drains/Airway: R PICC, L brachial A-line; LVAD      Dispo/Code Status/Palliative:   -- SICU / Full Code.            Michael Quinn MD  Critical Care - Surgery  [facility]

## 2022-07-10 NOTE — ASSESSMENT & PLAN NOTE
Kevan Queen is a 37yoM with a history of polysubstance abuse who presented to the hospital with decompensated heart failure. He underwent LVAD, RVAD and MVr on 6/29/22. He is admitted to the ICU post-operatively.       Neuro/Psych:   -- Sedation: none  -- Pain: Added scheduled gabapentin, tizanidine, lidocaine patch; PRN Tramadol and dilaudid for breakthough  with improvement in pain control     Cards:   -- s/p LVAD, RVAD insertion and MVr. RVAD removed 7/1  -- HDS on epi 0.04, dobutamine off; Decrease to 0.03 today.  -- Adding midodrine 5mg TID  -- s/p chest closure 6/30   -- MAP goal 70-85  -- Off cardene, PRN hydral available, continue norvasc      Pulm:   -- Goal O2 sat > 90%  -- ABG PRN  -- Extubated 7/1  -- Off nitric  -- daily CXR      Renal:  -- Trend BUN/Cr   -- lasix gtt at 7.5  -- Goal net negative under 1L       FEN / GI:   -- Replace lytes as needed  -- Nutrition: cardiac  -- GI ppx: famotidine  -- Bowel reg: miralax  -- Salt tabs in setting of hyponatremia, improving  -- Hepatology consult to acute liver failure, appreciate recs. Liver enzymes near normal.      ID:   -- Tm:afebrile; WBC stable  -- Dapto completed  -- Blood Cx from 7/2, remain negative      Heme/Onc:   -- H/H stable   -- Daily CBC  -- no intra-op product administered  -- 2u autologous  -- 1u pRBC, 1u FFP, 500mL albumin overnight (6/29)  -- Heparin gtt off, Hold warfarin today given INR      Endo:   -- BG goal 140-180  -- SSI   -- endocrine consulted      PPx:   Feeding: cardiac  Analgesia/Sedation:none / PRN oxy,Fentanyl  Thromboembolic prevention: SCDs,   HOB >30: Yes  Stress Ulcer ppx: famotidine  Glucose control: Critical care goal 140-180 g/dl, ISS     Lines/Drains/Airway: R PICC, L brachial A-line; LVAD      Dispo/Code Status/Palliative:   -- SICU / Full Code.

## 2022-07-10 NOTE — PROGRESS NOTES
[facility]  Heart Transplant  Progress Note    Patient Name: Kevan Queen  MRN: [id number]  Admission Date: 6/13/2022  Hospital Length of Stay: 27 days  Attending Physician: Luis F Paige MD  Primary Care Provider: ORALIA Cline  Principal Problem:Acute on chronic combined systolic and diastolic congestive heart failure, NYHA class 4    Subjective:     **Interval History: Remains hypervolemic. Net negative 1.7L/24hr on lasix gtt at 7.5mg/hr and epi .04. Recommend increasing lasix gtt for more aggressive diuresis. CVP 24 this AM, SVO2 48, CO/CI 7.7/3.3. Discussed recs with CTS resident.      Continuous Infusions:   sodium chloride 0.9% 5 mL/hr at 06/27/22 0055    sodium chloride 0.9% Stopped (07/05/22 1314)    EPINEPHrine 0.04 mcg/kg/min (07/10/22 0900)    furosemide (LASIX) 2 mg/mL continuous infusion (non-titrating) 7.5 mg/hr (07/10/22 0900)    insulin regular 1 units/mL infusion orderable (TRANSFER) Stopped (07/09/22 1749)    nicardipine Stopped (07/05/22 2204)     Scheduled Meds:   acetaminophen  650 mg Oral Q8H    amLODIPine  10 mg Oral Daily    ferrous gluconate  324 mg Oral Daily with breakfast    gabapentin  100 mg Oral TID    insulin aspart U-100  10 Units Subcutaneous TIDWM    INV aspirin/placebo  100 mg Oral Daily    LIDOcaine  1 patch Transdermal Q24H    pantoprazole  40 mg Oral Daily    polyethylene glycol  17 g Oral Daily    potassium bicarbonate  50 mEq Oral BID    sodium chloride 0.9%  10 mL Intravenous Q6H    sodium chloride  1 g Oral BID    tiZANidine  2 mg Oral Q8H     PRN Meds:albumin human 5%, albuterol sulfate, bisacodyL, dextrose 10%, dextrose 10%, glucagon (human recombinant), glucose, glucose, hydrALAZINE, HYDROmorphone, insulin aspart U-100, magnesium hydroxide 400 mg/5 ml, magnesium sulfate IVPB, potassium bicarbonate, potassium bicarbonate, potassium bicarbonate, sodium chloride 0.9%, sodium chloride 0.9%, Flushing PICC Protocol **AND**  sodium chloride 0.9% **AND** sodium chloride 0.9%, traMADoL    Review of patient's allergies indicates:   Allergen Reactions    Aspirin Other (See Comments)     Mr. Thacker is enrolled in Dr. Paige's Alon Trial and cannot have any aspirin and/or aspirin-containing products. DO NOT cancel any orders for INV Aspirin 100 mg/Placebo. If you have questions, please contact Isabel @ [phone], [phone],[email or web address], secure chat or MS Teams message.    Bumex [bumetanide] Hives    Lactose Diarrhea     Other reaction(s): Abdominal distension, gaseous    Torsemide Hives     Objective:     Vital Signs (Most Recent):  Temp: 98.4 °F (36.9 °C) (07/10/22 0300)  Pulse: 91 (07/10/22 0945)  Resp: 15 (07/10/22 0945)  BP: (!) 74/0 (07/10/22 0830)  SpO2: 100 % (07/10/22 0800)   Vital Signs (24h Range):  Temp:  [97.9 °F (36.6 °C)-98.4 °F (36.9 °C)] 98.4 °F (36.9 °C)  Pulse:  [] 91  Resp:  [9-43] 15  SpO2:  [88 %-100 %] 100 %  BP: (74-86)/(0) 74/0  Arterial Line BP: ()/() 82/67     Patient Vitals for the past 72 hrs (Last 3 readings):   Weight   07/10/22 0400 105 kg (231 lb 7.7 oz)   07/09/22 0500 104 kg (229 lb 4.5 oz)   07/08/22 1200 97.5 kg (214 lb 15.2 oz)       Body mass index is 28.93 kg/m².      Intake/Output Summary (Last 24 hours) at 7/10/2022 1031  Last data filed at 7/10/2022 0900  Gross per 24 hour   Intake 1652.75 ml   Output 3305 ml   Net -1652.25 ml         Hemodynamic Parameters:       Telemetry: ST    Physical Exam  Vitals and nursing note reviewed.   Constitutional:       Appearance: Normal appearance.      Comments: Sitting in chair NAD   HENT:      Head: Normocephalic and atraumatic.   Eyes:      Extraocular Movements: Extraocular movements intact.      Conjunctiva/sclera: Conjunctivae normal.   Neck:      Comments: JVP visible at level of ear sitting upright in chair  Cardiovascular:      Rate and Rhythm: Regular rhythm. Tachycardia present.      Comments: Smooth VAD hum  Pulmonary:       Breath sounds: Normal breath sounds.   Abdominal:      Palpations: Abdomen is soft.   Musculoskeletal:         General: Swelling present.      Cervical back: Neck supple.      Comments: Bilateral pedal edema, now with compression stockings in place   Skin:     General: Skin is dry.      Capillary Refill: Capillary refill takes 2 to 3 seconds.   Neurological:      Mental Status: He is alert.       Significant Labs:  CBC:  Recent Labs   Lab 07/08/22  0408 07/09/22  0355 07/10/22  0343   WBC 17.30* 14.65* 14.13*   RBC 2.58* 2.56* 2.63*   HGB 7.1* 6.9* 7.0*   HCT 22.6* 22.4* 23.2*    388 419   MCV 88 88 88   MCH 27.5 27.0 26.6*   MCHC 31.4* 30.8* 30.2*       BNP:  Recent Labs   Lab 07/04/22  0301 07/06/22  0402 07/08/22  0408   * 955* 677*       CMP:  Recent Labs   Lab 07/08/22  0408 07/08/22  1005 07/09/22  0355 07/09/22  1115 07/09/22  1630 07/09/22  2123 07/10/22  0343   *  --  202*  --   --   --  217*   CALCIUM 9.3  --  9.0  --   --   --  9.1   ALBUMIN 3.2*  --  3.0*  --   --   --  3.0*   PROT 7.0  --  6.9  --   --   --  6.7   *  --  130*  --   --   --  130*   K 3.9  4.0   < > 4.1   < > 4.4 4.3 3.8  3.7   CO2 27  --  29  --   --   --  31*   CL 88*  --  88*  --   --   --  89*   BUN 38*  --  34*  --   --   --  30*   CREATININE 1.5*  --  1.3  --   --   --  1.3   ALKPHOS 161*  --  171*  --   --   --  167*   *  --  114*  --   --   --  92*   AST 59*  --  53*  --   --   --  48*   BILITOT 1.5*  --  1.2*  --   --   --  1.0    < > = values in this interval not displayed.        Coagulation:   Recent Labs   Lab 07/08/22 0408 07/09/22 0355 07/09/22  0740 07/10/22  0343   INR 2.8*  --  3.7* 2.4*   APTT 35.3* 35.9*  --  34.5*       LDH:  Recent Labs   Lab 07/08/22 0408 07/09/22  0355 07/10/22  0343   * 481* 439*       Microbiology:  Microbiology Results (last 7 days)       Procedure Component Value Units Date/Time    Blood culture [[id number]] Collected: 07/02/22 1629    Order  "Status: Completed Specimen: Blood from Peripheral, Hand, Right Updated: 07/07/22 1812     Blood Culture, Routine No growth after 5 days.    Blood culture [[id number]] Collected: 07/02/22 1618    Order Status: Completed Specimen: Blood from Peripheral, Antecubital, Right Updated: 07/07/22 1812     Blood Culture, Routine No growth after 5 days.    Blood culture [[id number]] Collected: 06/30/22 1836    Order Status: Completed Specimen: Blood from Peripheral, Wrist, Left Updated: 07/05/22 2012     Blood Culture, Routine No growth after 5 days.    Blood culture [[id number]] Collected: 06/30/22 1833    Order Status: Completed Specimen: Blood from Peripheral, Forearm, Right Updated: 07/05/22 2012     Blood Culture, Routine No growth after 5 days.            I have reviewed all pertinent labs within the past 24 hours.    Estimated Creatinine Clearance: 102 mL/min (based on SCr of 1.3 mg/dL).    Diagnostic Results:  I have reviewed and interpreted all pertinent imaging results/findings within the past 24 hours.    Assessment and Plan:     36 yo male with NIDCM with BiV systolic heart failure, on home Milrinone at 0.375 mcg/kg/min, presented at [facility] 4/2/22 ,was not evaluated for OHT as he has recently quit smoking in April 2022 but was approved for VAD with plan to begin OHT evaluation in upcoming months if Mr Queen is stable and suitable for OHT eval (blood group A), 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 Went to ED at [facility] 6/10 but left after waiting 4 hours. Had clinic appointment with us today, but arrived to [facility] early this morning and decided to go to the ED instead. Baseline Lasix " dose is 80 mg bid. Reports taking 240 mg qd past 3 days with no improvement. BNP is 1701, up from 898 on 6/2 and 49 on 5/24. sCr is 1.8 with baseline ~ 1.5-1.7. sPO2 on RA is 93%. Wife at bedside    He has been given Lasix 80 mg IVP in the ED with plan to start Lasix gtt at 20 mg/hr           * Acute on chronic combined systolic and diastolic congestive heart failure, NYHA class 4  - [facility]  - Was not evaluated for OHTx as he quit smoking in April 2022.   - Received HM3 on 6/29  - Hypervolemic this week on titrating lasix gtt. Recommend increasing lasix gtt for more aggressive diuresis and consider addition of metolazone/tolvaptan)  - See LVAD      Hyponatremia  -hypervolemic  -Improvement in Na with diuresis, recommend continued diuresis and discontinuation of salt tabs in setting of volume overload    LVAD (left ventricular assist device) present  -S/P DT HM3 with MVR plus Protek Duo for RV support 6/29 (Grecia)  -Rvad removed with concern for hemolysis, Marcella increased to 20,  -Post op antibiotic per CTS (h/o Staph epi bacteremia, cleareed by ID for surgery with rec to continue antibiotic coverage for 2 weeks post op)  -AC GASTON trial   -Speed set at 5300 (increased to 5300 on 7/6)   -Echo done 7/5 EF: 10%, LVEDD: 7.13 with speed at 5200.  Would repeat echo in couple days after speed change   -Would increase lasix drip (+ consider  adding metolazone/tolvaptan ).  - off.  Would consider Primacor if needed for inotropic support   -AC per primary.    Procedure: Device Interrogation Including analysis of device parameters  Current Settings: Ventricular Assist Device  Review of device function is stable    TXP LVAD INTERROGATIONS 7/10/2022 7/10/2022 7/10/2022 7/10/2022 7/10/2022 7/10/2022 7/10/2022   Type HeartMate3 HeartMate3 HeartMate3 HeartMate3 HeartMate3 HeartMate3 HeartMate3   Flow 4.6 4.7 4.6 4.7 4.9 4.8 4.5   Speed 5300 5300 5300 5300 5300 5300 5300   PI 3.6 3.1 3.3 2.9 2.9 2.9 3.6   Power (Serna) 3.8 3.8 3.8  3.8 3.9 3.8 3.8   LSL 4900 4900 4900 4900 4900 4900 4900   Pulsatility - - Intermittent pulse - - - -       Staphylococcus epidermidis bacteremia  -Blood cultures 6/20 and repeat 6/21 positive for Staph Epi. One of two blood cultures from 6/23 positive for Staph (taken prior to line exchange). Repeat cultures sent 6/25 NGTD.   -IJ exchanged on 6/23, IABP exchanged 6/23  -ID recommended 14 day course of vancomycin from VAD implantation on 6/29 with end date: 7/12/22. Vancomycin discontinued per CTS with concerns for elevated sCr. ID with new recs to complete 7d course of daptomycin, which was completed 7/7/22.        Uncontrolled diabetes mellitus  Admitted 5/24-5/27 with hyperglycemia hyperosmolar syndrome  -Hgb A1C 9.2 on 5/20/22  -Endocrine following, on insulin gtt    CKD (chronic kidney disease) stage 3, GFR 30-59 ml/min  SCr baseline ~ 1.5-1.7    Uninterrupted Critical Care/Counseling Time (not including procedures): 60 minutes      Lupe Villavicencio PA-C  Heart Transplant  [facility]

## 2022-07-10 NOTE — PROGRESS NOTES
07/10/2022  Ruma Li    Current provider:  Luis F Paige MD    Device interrogation:  TXP LVAD INTERROGATIONS 7/10/2022 7/10/2022 7/10/2022 7/10/2022 7/10/2022 7/10/2022 7/10/2022   Type HeartMate3 HeartMate3 HeartMate3 HeartMate3 HeartMate3 HeartMate3 HeartMate3   Flow 4.7 4.6 4.7 4.9 4.8 4.5 4.6   Speed 5300 5300 5300 5300 5300 5300 5300   PI 3.1 3.3 2.9 2.9 2.9 3.6 3.5   Power (Serna) 3.8 3.8 3.8 3.9 3.8 3.8 3.8   LSL 4900 4900 4900 4900 4900 4900 4900   Pulsatility - Intermittent pulse - - - - Intermittent pulse          Rounded on Kevan Queen to ensure all mechanical assist device settings (IABP or VAD) were appropriate and all parameters were within limits.  I was able to ensure all back up equipment was present, the staff had no issues, and the Perfusion Department daily rounding was complete.      For implantable VADs: Interrogation of Ventricular assist device was performed with analysis of device parameters and review of device function. I have personally reviewed the interrogation findings and agree with findings as stated.     In emergency, the nursing units have been notified to contact the perfusion department either by:  Calling [phone] from 630am to 4pm Mon thru Fri, utilizing the On-Call Finder functionality of Epic and searching for Perfusion, or by contacting the hospital  from 4pm to 630am and on weekends and asking to speak with the perfusionist on call.    9:24 AM

## 2022-07-10 NOTE — PT/OT/SLP PROGRESS
Physical Therapy Co-Treatment  Co-treatment completed due to patient's complexity and expected need for 2 skilled therapists for functional and safe mobility at this time, maximize pt's participation with therapy, and to accommodate pt's activity tolerance post-op.      Patient Name:  Kevan Queen   MRN:  [id number]    Recommendations:     Discharge Recommendations:  rehabilitation facility   Discharge Equipment Recommendations:  (TBD by next level of care or pending pt progression)   Barriers to discharge: Inaccessible home and increased level of assistance with functional mobility    Assessment:     Kevan Queen is a 37 y.o. male admitted with a medical diagnosis of Acute on chronic combined systolic and diastolic congestive heart failure, NYHA class 4.  He presents with the following impairments/functional limitations:  weakness, impaired endurance, impaired self care skills, impaired functional mobilty, gait instability, decreased coordination, decreased upper extremity function, decreased lower extremity function, impaired fine motor, edema, orthopedic precautions, pain. Pt found sitting EOB this afternoon. Pt reporting significant fatigue after sitting up since 6am. Pt amenable to BLE there-ex. Pt performed BLE exercises seated EOB. Pt also educated on there-ex for L shoulder for frozen shoulder. Pt requesting to return to bed after BLE there-ex 2/2 fatigue.    Pt would benefit from intensive inpatient rehabilitation for: Dynamic/static standing/sitting balance through skilled balance training, strengthening with the use of skilled therapeutic exercises interventions, mobility and safety training to ensure safe discharge home through skilled patient and caregiver education home management training, mobility through community re-integration training and mobility through adaptive equipment training. Pt highly motivated to return to independent PLOF and can tolerate 3+hours of therapy. Pt continues to benefit  from a collaborative PT/OT program to improve quality of life and focus on recovery of impairments.      Rehab Prognosis: Good; patient would benefit from acute skilled PT services to address these deficits and reach maximum level of function.    Recent Surgery: Procedure(s) (LRB):  CLOSURE, WOUND, STERNUM (N/A)  INSERTION-RIGHT VENTRICULAR ASSIST DEVICE (Right)  APPLICATION, WOUND VAC (N/A)  IRRIGATION, MEDIASTINUM 10 Days Post-Op    Plan:     During this hospitalization, patient to be seen 6 x/week to address the identified rehab impairments via gait training, therapeutic activities, therapeutic exercises, neuromuscular re-education and progress toward the following goals:    · Plan of Care Expires:  08/01/22    Subjective     Chief Complaint: fatigue from sitting up since 6am  Patient/Family Comments/goals: R ankle pain improved, but still sensitive to palpation and movement  Pain/Comfort:  · Pain Rating 1: other (see comments) (not rated)  · Location - Side 1: Right  · Location 1: ankle  · Pain Addressed 1: Distraction, Reposition, Cessation of Activity  · Pain Rating Post-Intervention 1: other (see comments) (not rated)      Objective:     Communicated with RN prior to session.  Patient found sitting edge of bed with telemetry, peripheral IV, pulse ox (continuous), PICC line, arterial line, blood pressure cuff, LVAD upon PT entry to room.     General Precautions: Standard, fall, LVAD, sternal   Orthopedic Precautions: sternal   Braces: N/A     Functional Mobility:  · Bed Mobility:     · Sit to Supine: stand by assistance  · Balance:   · Static Sitting: Supervision  · Dynamic Sitting: N/T    LVAD:   · Pt remained on wall power throughout session.     AM-PAC 6 CLICK MOBILITY  Turning over in bed (including adjusting bedclothes, sheets and blankets)?: 3  Sitting down on and standing up from a chair with arms (e.g., wheelchair, bedside commode, etc.): 2  Moving from lying on back to sitting on the side of the bed?:  2  Moving to and from a bed to a chair (including a wheelchair)?: 1  Need to walk in hospital room?: 1  Climbing 3-5 steps with a railing?: 1  Basic Mobility Total Score: 10       Therapeutic Activities and Exercises:  Patient also educated and instructed on therapeutic exercises. Therapeutic exercises included the following: Ankle pumps, Long Arc Quads, Seated marches (Single leg). Pt performed 1x10 on BLEs with verbal/tactile cuing as needed.  Pt educated on gentle PROM to L shoulder as tolerated (flex, extension, abduction, shoulder shrugs, and shoulder rolls).  Patient educated on role of therapy, goals of session, and benefits of mobilizing.   Discussed PT plan of care during hospitalization.   Patient educated on calling for assistance.   Patient educated on how their diagnosis impacts their mobility within PT scope of practice.   Communication board up to date.  All questions answered within PT scope of practice.    Patient left HOB elevated with all lines intact, call button in reach, RN notified and pt's spouse present..    GOALS:   Multidisciplinary Problems     Physical Therapy Goals        Problem: Physical Therapy    Goal Priority Disciplines Outcome Goal Variances Interventions   Physical Therapy Goal     PT, PT/OT Ongoing, Progressing     Description: Goals to be met by: 2022    Patient will increase functional independence with mobility by performin. Supine to sit with MInimal Assistance -not met  3. Sit to stand transfer with Minimal Assistance -not met  4. Gait  x 50 feet with Contact Guard Assistance -not met.                  Multidisciplinary Problems (Resolved)        Problem: Physical Therapy    Goal Priority Disciplines Outcome Goal Variances Interventions   Physical Therapy Goal   (Resolved)     PT, PT/OT Met     Description:     Problem: Physical Therapy  Goal: Physical Therapy Goal  Description: Goals to be met by: 2022     Patient will increase functional independence  with mobility by performin. Lower extremity exercise program without IABP 30 reps per handout, with independence  2.Upper extremity exercise program 30 reps per handout, with independence                         Time Tracking:     PT Received On: 07/10/22  PT Start Time: 1426     PT Stop Time: 1443  PT Total Time (min): 17 min     Billable Minutes: Therapeutic Exercise 8       PT/PTA: PT     PTA Visit Number: 0     07/10/2022

## 2022-07-10 NOTE — PT/OT/SLP PROGRESS
Occupational Therapy   Treatment    Name: Kevan Queen  MRN: [id number]  Admitting Diagnosis:  Acute on chronic combined systolic and diastolic congestive heart failure, NYHA class 4  10 Days Post-Op    Recommendations:     Discharge Recommendations: rehabilitation facility  Discharge Equipment Recommendations:   (TBD)  Barriers to discharge:  None    Assessment:     Kevan Queen is a 37 y.o. male with a medical diagnosis of Acute on chronic combined systolic and diastolic congestive heart failure, NYHA class 4.  He presents with the following performance deficits affecting function are weakness, impaired endurance, impaired self care skills, impaired functional mobilty, gait instability, impaired balance, decreased upper extremity function, decreased lower extremity function, edema, impaired cardiopulmonary response to activity, decreased ROM, pain, impaired sensation.     Patient participated fair with therapy, sat in bedside chair since 6 am on this date and reporting fatigue; completed therex edge of bed x 15 minutes for LE edema, educated on techniques for positioning to address pain (primarily in L shoulder); SBA to return self to supine and scooting; will continue to benefit from therapy during this admission and placement in rehabilitation facility.    Rehab Prognosis:  Good; patient would benefit from acute skilled OT services to address these deficits and reach maximum level of function.       Plan:     Patient to be seen 6 x/week to address the above listed problems via self-care/home management, therapeutic activities, therapeutic exercises, neuromuscular re-education  · Plan of Care Expires: 08/02/22  · Plan of Care Reviewed with: patient, significant other    Subjective     Pain/Comfort:  · Pain Rating 1: other (see comments) (Not rated)  · Location - Side 1: Right  · Location - Orientation 1: upper  · Location 1: ankle  · Pain Addressed 1: Distraction, Cessation of Activity, Reposition  · Pain Rating  Post-Intervention 1: other (see comments) (Unrated)    Objective:     Communicated with: Nursing prior to session.  Patient found sitting edge of bed with arterial line, blood pressure cuff, telemetry, pulse ox (continuous), peripheral IV, LVAD upon OT entry to room.    General Precautions: Standard, fall, LVAD   Orthopedic Precautions:N/A   Braces: N/A  Respiratory Status: Room air     Occupational Performance:     Bed Mobility:    · Patient completed Rolling/Turning to Right with stand by assistance  · Patient completed Scooting/Bridging with stand by assistance  · Patient completed Sit to Supine with stand by assistance     Activities of Daily Living:  · Grooming: stand by assistance      [facility] 6 Click ADL: 15    Treatment & Education:  -Patient and family educated on roles/goals of OT and POC.  -White board updated.  -Therapist provided time for questions and answered within scope of practice.  -Patient educated on importance of EOB/OOB activity to maximize recovery.    Patient left supine with all lines intact and call button in reachEducation:      GOALS:   Multidisciplinary Problems     Occupational Therapy Goals        Problem: Occupational Therapy    Goal Priority Disciplines Outcome Interventions   Occupational Therapy Goal     OT, PT/OT Ongoing, Progressing    Description: Goals to be met by: 8/2/22     Patient will increase functional independence with ADLs by performing:    UE Dressing with Modified [place].  LE Dressing with Modified [place] and Assistive Devices as needed.  Grooming while standing at sink with Modified [place].  Toileting from toilet with Modified [place] for hygiene and clothing management.   Bathing from  shower chair/bench with Modified [place].  Toilet transfer to toilet with Modified [place].  Increased functional strength to WFL for B UE.  Upper extremity exercise program x15 reps per handout, with assistance as needed.  Pt will be I in all LVAD  management.                     Time Tracking:     OT Date of Treatment: 07/10/22  OT Start Time: 1424  OT Stop Time: 1446  OT Total Time (min): 22 min    Billable Minutes:Therapeutic Activity 22    OT/MARTÍNEZ: OT          7/10/2022

## 2022-07-10 NOTE — PLAN OF CARE
Significant events:  Epinephrine  weaned to 0.03 mcg/kg/min. Midodrine added. OOBTC ~7 hrs.  Vitals/Respiratory:  1 L NC.  O2: >96%.   HR: 80-90's, sinus/sinus tachycardia.  MAP: 70-85.  CVP: 19-21.  Temperature max:  98.3 F.   Gtts:  Epinephrine at 0.03 mcg/kg/min and Lasix at 7.5 mg/hr.  UOP: 1.2 L per urinal.       Last Bowel Movement: 7/9/22.   LVAD: HM3. Speed 5300.  Low speed 4900.  Flows: 4.4-4.7.  PI: 2.7-3.9.  Power: 3.8-3.9.  Dressing change performed with significant other, Niharika, at bedside. Teaching on dressing change performed.   Neuro: Pupils equal and reactive. AAOx4, follows commands, and moves all extremities purposefully.     Diet:  Regular. 1.5 L fluid restriction.   Labs/Accuchecks:   K and mag Q6 hrs. ACHS.      Plan:   Wean Epi by 0.01 QD if BP tolerates. Wean oxygen as tolerated. Continue ICU care. Plan of care reviewed with patient and significant other, all questions answered.     Skin:  Midsternal incision covered with island border, CDI. LVAD exit site a 2 on assessment. Weight shifting assistance provide Q2 hrs. Sacral foam, chair cushion, and Immerse mattress utilized. No new skin breakdown noted.

## 2022-07-11 LAB
ALBUMIN SERPL BCP-MCNC: 2.9 G/DL (ref 3.5–5.2)
ALLENS TEST: ABNORMAL
ALP SERPL-CCNC: 156 U/L (ref 55–135)
ALT SERPL W/O P-5'-P-CCNC: 73 U/L (ref 10–44)
ANION GAP SERPL CALC-SCNC: 10 MMOL/L (ref 8–16)
APTT BLDCRRT: 32.3 SEC (ref 21–32)
AST SERPL-CCNC: 41 U/L (ref 10–40)
BASOPHILS # BLD AUTO: 0.02 K/UL (ref 0–0.2)
BASOPHILS NFR BLD: 0.1 % (ref 0–1.9)
BILIRUB SERPL-MCNC: 0.8 MG/DL (ref 0.1–1)
BNP SERPL-MCNC: 711 PG/ML (ref 0–99)
BUN SERPL-MCNC: 23 MG/DL (ref 6–20)
CALCIUM SERPL-MCNC: 9.2 MG/DL (ref 8.7–10.5)
CHLORIDE SERPL-SCNC: 94 MMOL/L (ref 95–110)
CO2 SERPL-SCNC: 31 MMOL/L (ref 23–29)
CREAT SERPL-MCNC: 1.2 MG/DL (ref 0.5–1.4)
CRP SERPL-MCNC: 54.1 MG/L (ref 0–8.2)
DELSYS: ABNORMAL
DIFFERENTIAL METHOD: ABNORMAL
EOSINOPHIL # BLD AUTO: 0.1 K/UL (ref 0–0.5)
EOSINOPHIL NFR BLD: 0.8 % (ref 0–8)
ERYTHROCYTE [DISTWIDTH] IN BLOOD BY AUTOMATED COUNT: 16.6 % (ref 11.5–14.5)
EST. GFR  (AFRICAN AMERICAN): >60 ML/MIN/1.73 M^2
EST. GFR  (NON AFRICAN AMERICAN): >60 ML/MIN/1.73 M^2
FIBRINOGEN PPP-MCNC: 613 MG/DL (ref 182–400)
GLUCOSE SERPL-MCNC: 119 MG/DL (ref 70–110)
HCO3 UR-SCNC: 32.9 MMOL/L (ref 24–28)
HCT VFR BLD AUTO: 22.3 % (ref 40–54)
HGB BLD-MCNC: 7 G/DL (ref 14–18)
IMM GRANULOCYTES # BLD AUTO: 0.07 K/UL (ref 0–0.04)
IMM GRANULOCYTES NFR BLD AUTO: 0.5 % (ref 0–0.5)
INR PPP: 2.2 (ref 0.8–1.2)
LDH SERPL L TO P-CCNC: 442 U/L (ref 110–260)
LYMPHOCYTES # BLD AUTO: 1.6 K/UL (ref 1–4.8)
LYMPHOCYTES NFR BLD: 11.9 % (ref 18–48)
MAGNESIUM SERPL-MCNC: 1.7 MG/DL (ref 1.6–2.6)
MAGNESIUM SERPL-MCNC: 1.8 MG/DL (ref 1.6–2.6)
MAGNESIUM SERPL-MCNC: 2.1 MG/DL (ref 1.6–2.6)
MAGNESIUM SERPL-MCNC: 2.1 MG/DL (ref 1.6–2.6)
MCH RBC QN AUTO: 27 PG (ref 27–31)
MCHC RBC AUTO-ENTMCNC: 31.4 G/DL (ref 32–36)
MCV RBC AUTO: 86 FL (ref 82–98)
MONOCYTES # BLD AUTO: 1.2 K/UL (ref 0.3–1)
MONOCYTES NFR BLD: 8.6 % (ref 4–15)
NEUTROPHILS # BLD AUTO: 10.7 K/UL (ref 1.8–7.7)
NEUTROPHILS NFR BLD: 78.1 % (ref 38–73)
NRBC BLD-RTO: 1 /100 WBC
PCO2 BLDA: 48.4 MMHG (ref 35–45)
PH SMN: 7.44 [PH] (ref 7.35–7.45)
PLATELET # BLD AUTO: 437 K/UL (ref 150–450)
PMV BLD AUTO: 9.7 FL (ref 9.2–12.9)
PO2 BLDA: 24 MMHG (ref 40–60)
POC BE: 9 MMOL/L
POC SATURATED O2: 44 % (ref 95–100)
POC TCO2: 34 MMOL/L (ref 24–29)
POCT GLUCOSE: 125 MG/DL (ref 70–110)
POCT GLUCOSE: 138 MG/DL (ref 70–110)
POCT GLUCOSE: 232 MG/DL (ref 70–110)
POTASSIUM SERPL-SCNC: 3.2 MMOL/L (ref 3.5–5.1)
POTASSIUM SERPL-SCNC: 3.9 MMOL/L (ref 3.5–5.1)
POTASSIUM SERPL-SCNC: 4.2 MMOL/L (ref 3.5–5.1)
POTASSIUM SERPL-SCNC: 4.5 MMOL/L (ref 3.5–5.1)
PROT SERPL-MCNC: 6.5 G/DL (ref 6–8.4)
PROTHROMBIN TIME: 21.4 SEC (ref 9–12.5)
RBC # BLD AUTO: 2.59 M/UL (ref 4.6–6.2)
SAMPLE: ABNORMAL
SITE: ABNORMAL
SODIUM SERPL-SCNC: 135 MMOL/L (ref 136–145)
WBC # BLD AUTO: 13.73 K/UL (ref 3.9–12.7)

## 2022-07-11 PROCEDURE — 94761 N-INVAS EAR/PLS OXIMETRY MLT: CPT

## 2022-07-11 PROCEDURE — 27000248 HC VAD-ADDITIONAL DAY

## 2022-07-11 PROCEDURE — 25000003 PHARM REV CODE 250

## 2022-07-11 PROCEDURE — 99291 PR CRITICAL CARE, E/M 30-74 MINUTES: ICD-10-PCS | Mod: FS,,, | Performed by: INTERNAL MEDICINE

## 2022-07-11 PROCEDURE — 63600175 PHARM REV CODE 636 W HCPCS: Performed by: STUDENT IN AN ORGANIZED HEALTH CARE EDUCATION/TRAINING PROGRAM

## 2022-07-11 PROCEDURE — 20000000 HC ICU ROOM

## 2022-07-11 PROCEDURE — 97116 GAIT TRAINING THERAPY: CPT

## 2022-07-11 PROCEDURE — 25000003 PHARM REV CODE 250: Performed by: ANESTHESIOLOGY

## 2022-07-11 PROCEDURE — 93750 PR INTERROGATE VENT ASSIST DEV, IN PERSON, W PHYSICIAN ANALYSIS: ICD-10-PCS | Mod: ,,, | Performed by: INTERNAL MEDICINE

## 2022-07-11 PROCEDURE — 97535 SELF CARE MNGMENT TRAINING: CPT

## 2022-07-11 PROCEDURE — 85610 PROTHROMBIN TIME: CPT | Performed by: THORACIC SURGERY (CARDIOTHORACIC VASCULAR SURGERY)

## 2022-07-11 PROCEDURE — 63600175 PHARM REV CODE 636 W HCPCS: Performed by: PHYSICIAN ASSISTANT

## 2022-07-11 PROCEDURE — 99291 CRITICAL CARE FIRST HOUR: CPT | Mod: ,,, | Performed by: ANESTHESIOLOGY

## 2022-07-11 PROCEDURE — 80053 COMPREHEN METABOLIC PANEL: CPT

## 2022-07-11 PROCEDURE — 86140 C-REACTIVE PROTEIN: CPT | Performed by: STUDENT IN AN ORGANIZED HEALTH CARE EDUCATION/TRAINING PROGRAM

## 2022-07-11 PROCEDURE — 93750 INTERROGATION VAD IN PERSON: CPT | Mod: ,,, | Performed by: INTERNAL MEDICINE

## 2022-07-11 PROCEDURE — 25000003 PHARM REV CODE 250: Performed by: STUDENT IN AN ORGANIZED HEALTH CARE EDUCATION/TRAINING PROGRAM

## 2022-07-11 PROCEDURE — 99900035 HC TECH TIME PER 15 MIN (STAT)

## 2022-07-11 PROCEDURE — 83615 LACTATE (LD) (LDH) ENZYME: CPT | Performed by: THORACIC SURGERY (CARDIOTHORACIC VASCULAR SURGERY)

## 2022-07-11 PROCEDURE — A4216 STERILE WATER/SALINE, 10 ML: HCPCS | Performed by: THORACIC SURGERY (CARDIOTHORACIC VASCULAR SURGERY)

## 2022-07-11 PROCEDURE — 25000003 PHARM REV CODE 250: Performed by: PHYSICIAN ASSISTANT

## 2022-07-11 PROCEDURE — 97530 THERAPEUTIC ACTIVITIES: CPT

## 2022-07-11 PROCEDURE — 84132 ASSAY OF SERUM POTASSIUM: CPT | Mod: 91

## 2022-07-11 PROCEDURE — 82803 BLOOD GASES ANY COMBINATION: CPT

## 2022-07-11 PROCEDURE — 99291 CRITICAL CARE FIRST HOUR: CPT | Mod: FS,,, | Performed by: INTERNAL MEDICINE

## 2022-07-11 PROCEDURE — 85730 THROMBOPLASTIN TIME PARTIAL: CPT | Performed by: STUDENT IN AN ORGANIZED HEALTH CARE EDUCATION/TRAINING PROGRAM

## 2022-07-11 PROCEDURE — 85384 FIBRINOGEN ACTIVITY: CPT | Performed by: STUDENT IN AN ORGANIZED HEALTH CARE EDUCATION/TRAINING PROGRAM

## 2022-07-11 PROCEDURE — 83880 ASSAY OF NATRIURETIC PEPTIDE: CPT | Performed by: STUDENT IN AN ORGANIZED HEALTH CARE EDUCATION/TRAINING PROGRAM

## 2022-07-11 PROCEDURE — 99291 PR CRITICAL CARE, E/M 30-74 MINUTES: ICD-10-PCS | Mod: ,,, | Performed by: ANESTHESIOLOGY

## 2022-07-11 PROCEDURE — 25000003 PHARM REV CODE 250: Performed by: THORACIC SURGERY (CARDIOTHORACIC VASCULAR SURGERY)

## 2022-07-11 PROCEDURE — 85025 COMPLETE CBC W/AUTO DIFF WBC: CPT | Performed by: THORACIC SURGERY (CARDIOTHORACIC VASCULAR SURGERY)

## 2022-07-11 PROCEDURE — 99233 PR SUBSEQUENT HOSPITAL CARE,LEVL III: ICD-10-PCS | Mod: ,,, | Performed by: INTERNAL MEDICINE

## 2022-07-11 PROCEDURE — 99233 SBSQ HOSP IP/OBS HIGH 50: CPT | Mod: ,,, | Performed by: INTERNAL MEDICINE

## 2022-07-11 PROCEDURE — 83735 ASSAY OF MAGNESIUM: CPT | Mod: 91

## 2022-07-11 RX ORDER — FUROSEMIDE 10 MG/ML
80 INJECTION INTRAMUSCULAR; INTRAVENOUS ONCE
Status: COMPLETED | OUTPATIENT
Start: 2022-07-11 | End: 2022-07-11

## 2022-07-11 RX ORDER — MIDODRINE HYDROCHLORIDE 5 MG/1
10 TABLET ORAL EVERY 8 HOURS
Status: DISCONTINUED | OUTPATIENT
Start: 2022-07-11 | End: 2022-07-11

## 2022-07-11 RX ORDER — WARFARIN SODIUM 5 MG/1
5 TABLET ORAL DAILY
Status: DISCONTINUED | OUTPATIENT
Start: 2022-07-11 | End: 2022-07-12

## 2022-07-11 RX ADMIN — INSULIN ASPART 8 UNITS: 100 INJECTION, SOLUTION INTRAVENOUS; SUBCUTANEOUS at 04:07

## 2022-07-11 RX ADMIN — FUROSEMIDE 80 MG: 10 INJECTION, SOLUTION INTRAMUSCULAR; INTRAVENOUS at 11:07

## 2022-07-11 RX ADMIN — ACETAMINOPHEN 650 MG: 325 TABLET ORAL at 06:07

## 2022-07-11 RX ADMIN — POLYETHYLENE GLYCOL 3350 17 G: 17 POWDER, FOR SOLUTION ORAL at 09:07

## 2022-07-11 RX ADMIN — HYDROMORPHONE HYDROCHLORIDE 0.2 MG: 1 INJECTION, SOLUTION INTRAMUSCULAR; INTRAVENOUS; SUBCUTANEOUS at 10:07

## 2022-07-11 RX ADMIN — Medication 10 ML: at 06:07

## 2022-07-11 RX ADMIN — ACETAMINOPHEN 650 MG: 325 TABLET ORAL at 02:07

## 2022-07-11 RX ADMIN — INSULIN ASPART 8 UNITS: 100 INJECTION, SOLUTION INTRAVENOUS; SUBCUTANEOUS at 08:07

## 2022-07-11 RX ADMIN — PANTOPRAZOLE SODIUM 40 MG: 40 TABLET, DELAYED RELEASE ORAL at 09:07

## 2022-07-11 RX ADMIN — TRAMADOL HYDROCHLORIDE 50 MG: 50 TABLET, COATED ORAL at 06:07

## 2022-07-11 RX ADMIN — Medication 10 ML: at 12:07

## 2022-07-11 RX ADMIN — Medication 324 MG: at 09:07

## 2022-07-11 RX ADMIN — TRAMADOL HYDROCHLORIDE 50 MG: 50 TABLET, COATED ORAL at 12:07

## 2022-07-11 RX ADMIN — ACETAMINOPHEN 650 MG: 325 TABLET ORAL at 09:07

## 2022-07-11 RX ADMIN — INSULIN ASPART 4 UNITS: 100 INJECTION, SOLUTION INTRAVENOUS; SUBCUTANEOUS at 08:07

## 2022-07-11 RX ADMIN — POTASSIUM BICARBONATE 50 MEQ: 978 TABLET, EFFERVESCENT ORAL at 09:07

## 2022-07-11 RX ADMIN — TRAMADOL HYDROCHLORIDE 50 MG: 50 TABLET, COATED ORAL at 07:07

## 2022-07-11 RX ADMIN — TIZANIDINE 2 MG: 2 TABLET ORAL at 06:07

## 2022-07-11 RX ADMIN — FUROSEMIDE 80 MG: 10 INJECTION, SOLUTION INTRAMUSCULAR; INTRAVENOUS at 10:07

## 2022-07-11 RX ADMIN — TIZANIDINE 2 MG: 2 TABLET ORAL at 02:07

## 2022-07-11 RX ADMIN — TIZANIDINE 2 MG: 2 TABLET ORAL at 09:07

## 2022-07-11 RX ADMIN — HYDROMORPHONE HYDROCHLORIDE 0.2 MG: 1 INJECTION, SOLUTION INTRAMUSCULAR; INTRAVENOUS; SUBCUTANEOUS at 04:07

## 2022-07-11 RX ADMIN — GABAPENTIN 100 MG: 100 CAPSULE ORAL at 09:07

## 2022-07-11 RX ADMIN — MAGNESIUM SULFATE HEPTAHYDRATE 2 G: 40 INJECTION, SOLUTION INTRAVENOUS at 11:07

## 2022-07-11 RX ADMIN — POTASSIUM BICARBONATE 50 MEQ: 978 TABLET, EFFERVESCENT ORAL at 07:07

## 2022-07-11 RX ADMIN — INSULIN ASPART 8 UNITS: 100 INJECTION, SOLUTION INTRAVENOUS; SUBCUTANEOUS at 11:07

## 2022-07-11 RX ADMIN — MIDODRINE HYDROCHLORIDE 5 MG: 5 TABLET ORAL at 09:07

## 2022-07-11 RX ADMIN — FUROSEMIDE 10 MG/HR: 10 INJECTION, SOLUTION INTRAMUSCULAR; INTRAVENOUS at 05:07

## 2022-07-11 RX ADMIN — WARFARIN SODIUM 5 MG: 5 TABLET ORAL at 04:07

## 2022-07-11 RX ADMIN — GABAPENTIN 100 MG: 100 CAPSULE ORAL at 04:07

## 2022-07-11 NOTE — PROGRESS NOTES
07/11/2022  John Alaniz    Current provider:  Luis F Paige MD    Device interrogation:  TXP LVAD INTERROGATIONS 7/11/2022 7/11/2022 7/11/2022 7/11/2022 7/11/2022 7/11/2022 7/11/2022   Type - - - HeartMate3 - - -   Flow 4.5 4.6 4.7 4.6 4.7 4.8 4.6   Speed 5300 5300 5300 5300 5300 5300 5300   PI 3.3 3.2 3.1 3.1 3.1 2.6 3.1   Power (Serna) 3.9 3.8 3.8 3.8 3.8 3.9 3.8   LSL 4900 4900 4900 4900 4900 4900 4900   Pulsatility - - - Intermittent pulse - - -          Rounded on Kevan Queen to ensure all mechanical assist device settings (IABP or VAD) were appropriate and all parameters were within limits.  I was able to ensure all back up equipment was present, the staff had no issues, and the Perfusion Department daily rounding was complete.      For implantable VADs: Interrogation of Ventricular assist device was performed with analysis of device parameters and review of device function. I have personally reviewed the interrogation findings and agree with findings as stated.     In emergency, the nursing units have been notified to contact the perfusion department either by:  Calling [phone] from 630am to 4pm Mon thru Fri, utilizing the On-Call Finder functionality of Epic and searching for Perfusion, or by contacting the hospital  from 4pm to 630am and on weekends and asking to speak with the perfusionist on call.    7:00 AM

## 2022-07-11 NOTE — PLAN OF CARE
Significant events:  VAD speed decreased to 5200 rpm. 80 mg lasix IVP and infusion increased to 10 mg/hr. Walked in [place] with PT/OT. Pain better controlled. Midodrine and salt tablets D/C'd. OOBTC ~5 hrs.  Vitals/Respiratory:  RA.  O2: >97%.   HR: 's, sinus/sinus tachycardia.  MAP: 75-89.  CVP: 22-21.  Temperature max:  99 F.   Gtts:  Epinephrine at 0.03 mcg/kg/min and Lasix at 10 mg/hr.  UOP: 1.8 L per urinal.       Last Bowel Movement: 7/9/22.   LVAD: HM3. Speed 5200.  Low speed 4800.  Flows: 4.4-4.6.  PI: 2.8-4.2.  Power: 3.7-3.8.  Driveline exit site at 2 on assessment.   Neuro: Pupils equal and reactive. AAOx4, follows commands, and moves all extremities purposefully.     Diet:  Cardiac. 1.5 L fluid restriction.   Labs/Accuchecks:   K and mag Q6 hrs. ACHS.      Plan:   ECHO tomorrow. Continue ICU care. Plan of care reviewed with patient and significant other, all questions answered.     Skin:  Midsternal incision covered with island border, smal partial thickness open area at bottom of incision.   LVAD exit site a 2 on assessment. Weight shifting assistance provide Q2 hrs. Sacral foam, chair cushion, and Immerse mattress utilized. No new skin breakdown noted.

## 2022-07-11 NOTE — PT/OT/SLP PROGRESS
"Occupational Therapy  Co Treatment    Name: Kevan Queen  MRN: [id number]  Admitting Diagnosis:  Acute on chronic combined systolic and diastolic congestive heart failure, NYHA class 4  11 Days Post-Op    Recommendations:     Discharge Recommendations: rehabilitation facility    Assessment:     Kevan Queen is a 37 y.o. male with a medical diagnosis of Acute on chronic combined systolic and diastolic congestive heart failure, NYHA class 4.Performance deficits affecting function are weakness, impaired endurance, impaired self care skills, impaired functional mobilty, gait instability, impaired balance. Pt tolerated session well with improved participation and pain control this date.   Anticipate pt will progress well and to benefit from cont OT to address stated goals.   Rehab Prognosis:  Good; patient would benefit from acute skilled OT services to address these deficits and reach maximum level of function.       Plan:     Patient to be seen 6 x/week to address the above listed problems via self-care/home management, therapeutic activities, therapeutic exercises  · Plan of Care Expires: 08/02/22  · Plan of Care Reviewed with: patient, caregiver    Subjective   Pt agreeable to therapy.   "I have been looking forward to this since last night" pt states.     Pain/Comfort:  · Pain Rating 1: 2/10  · Location 1: chest  · Pain Addressed 1: Reposition, Distraction, Pre-medicate for activity  Pt reports no further pain in ankles.   Objective:     Communicated with: nsg  prior to session.  Patient found in chair with compression stockings, tele, pusle ox, BP cuff, LVAD to wall power, central line,  and caregiver in room.  Cotx completed this date to optimize functional performance and safety given impaired tolerance for activity in setting of ICU.     General Precautions: Standard, fall, LVAD     Occupational Performance:     Functional Mobility/Transfers:  · Sit>stand with SBA with cues needed for sternal precautions. "     Activities of Daily Living:  Feeding: set-up  G/H: standing with SBA for oral care. Pt with good B UE integration with minimal cues to avoid pushing UE's through sink/counter in stand       [facility] 6 Click ADL: 19    Treatment & Education:  Pt verb 2/3 sternal precautions and requiring minimal cues during functional activity to follow these precautions. Pt able to identify LVAD parts and completed transition to/from battery power with minimal vc for technique. Pt required supervision to place items in shoulder consolidation bag and required MIN A to carlyn bag given extensive lines.     Pt completed functional mobility in room with hallway with SBA. Pt moves very quickly needing cues to pace self with activity. Pt with SOB following activity. VSS throughout session.     Education provided re: OT POC and safety with functional mobility/ADl skills.     Patient left up in chair with all lines intact, call button in reach and nsg notifiedEducation:      GOALS:   Multidisciplinary Problems     Occupational Therapy Goals        Problem: Occupational Therapy    Goal Priority Disciplines Outcome Interventions   Occupational Therapy Goal     OT, PT/OT Ongoing, Progressing    Description: Goals to be met by: 8/2/22     Patient will increase functional independence with ADLs by performing:    UE Dressing with Modified [place].  LE Dressing with Modified [place] and Assistive Devices as needed.  Grooming while standing at sink with Modified [place].  Toileting from toilet with Modified [place] for hygiene and clothing management.   Bathing from  shower chair/bench with Modified [place].  Toilet transfer to toilet with Modified [place].  Increased functional strength to WFL for B UE.  Upper extremity exercise program x15 reps per handout, with assistance as needed.  Pt will be I in all LVAD management.                     Time Tracking:     OT Date of Treatment: 07/11/22  OT Start Time: 0823  OT  Stop Time: 0847  OT Total Time (min): 24 min    Billable Minutes:Self Care/Home Management 12  Therapeutic Activity 12    OT/MARTÍNEZ: OT          7/11/2022

## 2022-07-11 NOTE — NURSING
"      SICU PLAN OF CARE NOTE    Dx: Acute on chronic combined systolic and diastolic congestive heart failure, NYHA class 4    Shift Events: NAEON    Goals of Care: MAPs 70-85    Neuro: AAO x4, Follows Commands and Moves All Extremities    Vital Signs: BP (!) 70/0 (BP Location: Left arm, Patient Position: Lying)   Pulse 99   Temp 98.3 °F (36.8 °C) (Oral)   Resp 10   Ht 6' 3" (1.905 m)   Wt 105 kg (231 lb 7.7 oz)   SpO2 96%   BMI 28.93 kg/m²     Respiratory: Nasal Cannula 1L    Diet: Regular Diet, 1500cc FR    Gtts: Epinephrine and Lasix    Urine Output: Voids Spontaneously 1450 cc/shift    LVAD: HM3   Speed: 5300   Flow: 4.5-4.7   PI: 2.6-3.2   Power: 3.8-3.9    Doppler pressures: 72/0, 72/0, 70/0    CVPs: 20,16,18    Labs/Accuchecks: Accuchecks ACHS, K/Mag q 6hr    Skin: Skin CDI. Immerse bed in use. Compression stockings on. Foams in place. Patient able to reposition self indepedently although providing weight shift assistance as needed.       "

## 2022-07-11 NOTE — ASSESSMENT & PLAN NOTE
-S/P DT HM3 with MVR plus Protek Duo for RV support 6/29 ([place]recia)  -Rvad removed with concern for hemolysis, Marcella increased to 20,  -Post op antibiotic per CTS (h/o Staph epi bacteremia, cleareed by ID for surgery with rec to continue antibiotic coverage for 2 weeks post op)  -AC GASTON trial   -Speed set at 5300 (increased to 5300 on 7/6)   -Echo done 7/5 EF: 10%, LVEDD: 7.13 with speed at 5200. Need for formal ECHO   -Would increase lasix drip (+ consider adding metolazone/tolvaptan and d/c salt tablets)  - off.  Would consider Primacor if needed for inotropic support   -AC per primary.    Procedure: Device Interrogation Including analysis of device parameters  Current Settings: Ventricular Assist Device  Review of device function is stable    TXP LVAD INTERROGATIONS 7/11/2022 7/11/2022 7/11/2022 7/11/2022 7/11/2022 7/11/2022 7/11/2022   Type HeartMate3 HeartMate3 - - - HeartMate3 -   Flow 4.6 4.6 4.5 4.6 4.7 4.6 4.7   Speed 5300 5300 5300 5300 5300 5300 5300   PI 2.8 2.9 3.3 3.2 3.1 3.1 3.1   Power (Serna) 3.8 3.8 3.9 3.8 3.8 3.8 3.8   LSL 4900 4900 4900 4900 4900 4900 4900   Pulsatility - Intermittent pulse - - - Intermittent pulse -

## 2022-07-11 NOTE — SUBJECTIVE & OBJECTIVE
Interval History: Remains hypervolemic. Net -ve 1.5L/24hr and CVP 19 on lasix gtt at 7.5mg/hr. Na 135. Recommend increasing diuresis and discontinue salt tablets. Epi weaned to 0.03 with hypotension so midodrine 5 mg TID initiated. Due for formal ECHO after speed adjustment on 7/6. Progressing well with PT/OT. No complaints.         Continuous Infusions:   sodium chloride 0.9% 5 mL/hr at 06/27/22 0055    sodium chloride 0.9% Stopped (07/05/22 1314)    EPINEPHrine 0.03 mcg/kg/min (07/11/22 0800)    furosemide (LASIX) 2 mg/mL continuous infusion (non-titrating) 7.5 mg/hr (07/11/22 0800)    nicardipine Stopped (07/05/22 2204)     Scheduled Meds:   acetaminophen  650 mg Oral Q8H    amLODIPine  10 mg Oral Daily    ferrous gluconate  324 mg Oral Daily with breakfast    gabapentin  100 mg Oral TID    insulin aspart U-100  8 Units Subcutaneous TIDWM    insulin detemir U-100  15 Units Subcutaneous QHS    INV aspirin/placebo  100 mg Oral Daily    LIDOcaine  1 patch Transdermal Q24H    midodrine  5 mg Oral TID    oxymetazoline  2 spray Each Nostril BID    pantoprazole  40 mg Oral Daily    polyethylene glycol  17 g Oral Daily    potassium bicarbonate  50 mEq Oral BID    sodium chloride 0.9%  10 mL Intravenous Q6H    sodium chloride  1 g Oral BID    tiZANidine  2 mg Oral Q8H    warfarin  4 mg Oral Daily     PRN Meds:albumin human 5%, albuterol sulfate, bisacodyL, dextrose 10%, dextrose 10%, glucagon (human recombinant), glucose, glucose, hydrALAZINE, HYDROmorphone, insulin aspart U-100, magnesium hydroxide 400 mg/5 ml, magnesium sulfate IVPB, potassium bicarbonate, potassium bicarbonate, potassium bicarbonate, sodium chloride 0.9%, sodium chloride 0.9%, Flushing PICC Protocol **AND** sodium chloride 0.9% **AND** sodium chloride 0.9%, traMADoL    Review of patient's allergies indicates:   Allergen Reactions    Aspirin Other (See Comments)     Mr. Thacker is enrolled in Dr. Paige's Alon Trial and cannot have any aspirin and/or  aspirin-containing products. DO NOT cancel any orders for INV Aspirin 100 mg/Placebo. If you have questions, please contact Isabel @ [phone], [phone],[email or web address], secure chat or MS Teams message.    Bumex [bumetanide] Hives    Lactose Diarrhea     Other reaction(s): Abdominal distension, gaseous    Torsemide Hives     Objective:     Vital Signs (Most Recent):  Temp: 98.4 °F (36.9 °C) (07/11/22 0730)  Pulse: 94 (07/11/22 0800)  Resp: 20 (07/11/22 0800)  BP: (!) 70/0 (07/11/22 0300)  SpO2: 100 % (07/11/22 0730)   Vital Signs (24h Range):  Temp:  [98 °F (36.7 °C)-98.6 °F (37 °C)] 98.4 °F (36.9 °C)  Pulse:  [] 94  Resp:  [8-41] 20  SpO2:  [96 %-100 %] 100 %  BP: (70-84)/(0) 70/0  Arterial Line BP: ()/(64-84) 83/71     Patient Vitals for the past 72 hrs (Last 3 readings):   Weight   07/11/22 0600 105.3 kg (232 lb 2.3 oz)   07/10/22 0400 105 kg (231 lb 7.7 oz)   07/09/22 0500 104 kg (229 lb 4.5 oz)       Body mass index is 29.02 kg/m².      Intake/Output Summary (Last 24 hours) at 7/11/2022 0914  Last data filed at 7/11/2022 0800  Gross per 24 hour   Intake 965.84 ml   Output 2325 ml   Net -1359.16 ml         Hemodynamic Parameters:       Telemetry: ST    Physical Exam  Vitals and nursing note reviewed.   Constitutional:       Appearance: Normal appearance.      Comments: Sitting in chair   HENT:      Head: Normocephalic and atraumatic.   Eyes:      Extraocular Movements: Extraocular movements intact.      Conjunctiva/sclera: Conjunctivae normal.   Neck:      Comments: JVP visible at level of ear sitting upright in chair  Cardiovascular:      Rate and Rhythm: Regular rhythm. Tachycardia present.      Comments: Smooth VAD hum  Pulmonary:      Breath sounds: Normal breath sounds.   Abdominal:      Palpations: Abdomen is soft.   Musculoskeletal:         General: Swelling present.      Cervical back: Neck supple.      Comments: Bilateral pedal edema (improved), now with compression stockings in  place   Skin:     General: Skin is dry.      Capillary Refill: Capillary refill takes 2 to 3 seconds.   Neurological:      Mental Status: He is alert.       Significant Labs:  CBC:  Recent Labs   Lab 07/09/22  0355 07/10/22  0343 07/11/22  0234   WBC 14.65* 14.13* 13.73*   RBC 2.56* 2.63* 2.59*   HGB 6.9* 7.0* 7.0*   HCT 22.4* 23.2* 22.3*    419 437   MCV 88 88 86   MCH 27.0 26.6* 27.0   MCHC 30.8* 30.2* 31.4*       BNP:  Recent Labs   Lab 07/06/22  0402 07/08/22  0408 07/11/22  0234   * 677* 711*       CMP:  Recent Labs   Lab 07/09/22 0355 07/09/22  1115 07/10/22  0343 07/10/22  1033 07/10/22  1705 07/10/22  2137 07/11/22  0234   *  --  217*  --   --   --  119*   CALCIUM 9.0  --  9.1  --   --   --  9.2   ALBUMIN 3.0*  --  3.0*  --   --   --  2.9*   PROT 6.9  --  6.7  --   --   --  6.5   *  --  130*  --   --   --  135*   K 4.1   < > 3.8  3.7   < > 4.1 4.0 3.9   CO2 29  --  31*  --   --   --  31*   CL 88*  --  89*  --   --   --  94*   BUN 34*  --  30*  --   --   --  23*   CREATININE 1.3  --  1.3  --   --   --  1.2   ALKPHOS 171*  --  167*  --   --   --  156*   *  --  92*  --   --   --  73*   AST 53*  --  48*  --   --   --  41*   BILITOT 1.2*  --  1.0  --   --   --  0.8    < > = values in this interval not displayed.        Coagulation:   Recent Labs   Lab 07/09/22 0355 07/09/22  0740 07/10/22  0343 07/11/22  0234   INR  --  3.7* 2.4* 2.2*   APTT 35.9*  --  34.5* 32.3*       LDH:  Recent Labs   Lab 07/09/22  0355 07/10/22  0343 07/11/22  0234   * 439* 442*       Microbiology:  Microbiology Results (last 7 days)       Procedure Component Value Units Date/Time    Blood culture [[id number]] Collected: 07/02/22 1629    Order Status: Completed Specimen: Blood from Peripheral, Hand, Right Updated: 07/07/22 1812     Blood Culture, Routine No growth after 5 days.    Blood culture [[id number]] Collected: 07/02/22 1618    Order Status: Completed Specimen: Blood from Peripheral,  Antecubital, Right Updated: 07/07/22 1812     Blood Culture, Routine No growth after 5 days.    Blood culture [[id number]] Collected: 06/30/22 1836    Order Status: Completed Specimen: Blood from Peripheral, Wrist, Left Updated: 07/05/22 2012     Blood Culture, Routine No growth after 5 days.    Blood culture [[id number]] Collected: 06/30/22 1833    Order Status: Completed Specimen: Blood from Peripheral, Forearm, Right Updated: 07/05/22 2012     Blood Culture, Routine No growth after 5 days.            I have reviewed all pertinent labs within the past 24 hours.    Estimated Creatinine Clearance: 110.6 mL/min (based on SCr of 1.2 mg/dL).    Diagnostic Results:  I have reviewed and interpreted all pertinent imaging results/findings within the past 24 hours.

## 2022-07-11 NOTE — ASSESSMENT & PLAN NOTE
Kevan Queen is a 37yoM with a history of polysubstance abuse who presented to the hospital with decompensated heart failure. He underwent LVAD, RVAD and MVr on 6/29/22. He is admitted to the ICU post-operatively.       Neuro/Psych:   -- Sedation: none  -- Pain: Added scheduled gabapentin, tizanidine, lidocaine patch; PRN Tramadol and dilaudid for breakthough  with improvement in pain control     Cards:   -- s/p LVAD, RVAD insertion and MVr. RVAD removed 7/1  -- HDS on epi 0.03, dobutamine off; Decrease to 0.03 today.  -- Adding midodrine 5mg TID  -- s/p chest closure 6/30   -- MAP goal 70-85  -- Off cardene, PRN hydral available, continue norvasc; Added midodrine      Pulm:   -- Goal O2 sat > 90%  -- ABG PRN  -- Extubated 7/1  -- Off nitric  -- daily CXR      Renal:  -- Trend BUN/Cr   -- lasix gtt at 7.5  -- Goal net negative under 1L       FEN / GI:   -- Replace lytes as needed  -- Nutrition: cardiac  -- GI ppx: famotidine  -- Bowel reg: miralax  -- Salt tabs in setting of hyponatremia, improving  -- Hepatology consult to acute liver failure, appreciate recs. Liver enzymes near normal.      ID:   -- Tm:afebrile; WBC stable  -- Dapto completed  -- Blood Cx from 7/2, remain negative      Heme/Onc:   -- H/H stable   -- Daily CBC  -- no intra-op product administered  -- 2u autologous  -- 1u pRBC, 1u FFP, 500mL albumin overnight (6/29)  -- Continue warfarin 4 mg today      Endo:   -- BG goal 140-180  -- SSI   -- endocrine consulted      PPx:   Feeding: cardiac  Analgesia/Sedation:none / PRN oxy,Fentanyl  Thromboembolic prevention: SCDs,   HOB >30: Yes  Stress Ulcer ppx: famotidine  Glucose control: Critical care goal 140-180 g/dl, ISS     Lines/Drains/Airway: R PICC, L brachial A-line; LVAD      Dispo/Code Status/Palliative:   -- SICU / Full Code.

## 2022-07-11 NOTE — PROGRESS NOTES
[facility] - Surgical Intensive Care  Critical Care - Surgery  Progress Note    Patient Name: Kevan Queen  MRN: [id number]  Admission Date: 6/13/2022  Hospital Length of Stay: 28 days  Code Status: Full Code  Attending Provider: Luis F Paige MD  Primary Care Provider: ORALIA Cline   Principal Problem: Acute on chronic combined systolic and diastolic congestive heart failure, NYHA class 4    Subjective:     Hospital/ICU Course:  No notes on file    Interval History/Significant Events: NAEON. Epi down to 0.03.     Follow-up For: Procedure(s) (LRB):  CLOSURE, WOUND, STERNUM (N/A)  INSERTION-RIGHT VENTRICULAR ASSIST DEVICE (Right)  APPLICATION, WOUND VAC (N/A)  IRRIGATION, MEDIASTINUM    Post-Operative Day: 11 Days Post-Op    Objective:     Vital Signs (Most Recent):  Temp: 98.3 °F (36.8 °C) (07/11/22 0300)  Pulse: 100 (07/11/22 0630)  Resp: (!) 32 (07/11/22 0630)  BP: (!) 70/0 (07/11/22 0300)  SpO2: 96 % (07/10/22 2300)   Vital Signs (24h Range):  Temp:  [98 °F (36.7 °C)-98.6 °F (37 °C)] 98.3 °F (36.8 °C)  Pulse:  [] 100  Resp:  [8-41] 32  SpO2:  [96 %-100 %] 96 %  BP: (70-84)/(0) 70/0  Arterial Line BP: ()/() 86/75     Weight: 105.3 kg (232 lb 2.3 oz)  Body mass index is 29.02 kg/m².      Intake/Output Summary (Last 24 hours) at 7/11/2022 0713  Last data filed at 7/11/2022 0600  Gross per 24 hour   Intake 1108.79 ml   Output 2650 ml   Net -1541.21 ml       Physical Exam  Vitals reviewed.   Constitutional:       General: He is not in acute distress.  HENT:      Head: Normocephalic and atraumatic.   Eyes:      Extraocular Movements: Extraocular movements intact.   Neck:      Comments:     Cardiovascular:      Rate and Rhythm: Normal rate.      Pulses: Normal pulses.      Comments: S/p chest closure. Incision c/d/I with iodine gauze    LVAD speed 5300  Flows ~4L    L brachial art line      Pulmonary:      Effort: Pulmonary effort is normal. No respiratory distress.   Abdominal:      General:  Abdomen is flat. There is no distension.      Palpations: Abdomen is soft.   Musculoskeletal:      Comments: R PICC   Skin:     General: Skin is warm and dry.      Capillary Refill: Capillary refill takes less than 2 seconds.   Neurological:      General: No focal deficit present.      Mental Status: He is alert and oriented to person, place, and time.     Lines/Drains/Airways       Peripherally Inserted Central Catheter Line  Duration             PICC Triple Lumen 07/05/22 1554 right brachial 5 days              Arterial Line  Duration             Arterial Line 06/29/22 0832 Left Brachial 11 days              Line  Duration                  VAD 06/29/22 1115 Left ventricular assist device HeartMate 3 11 days              Peripheral Intravenous Line  Duration                  Peripheral IV - Single Lumen 07/08/22 0300 20 G Anterior;Right Forearm 3 days                    Significant Labs:    CBC/Anemia Profile:  Recent Labs   Lab 07/10/22  0343 07/11/22  0234   WBC 14.13* 13.73*   HGB 7.0* 7.0*   HCT 23.2* 22.3*    437   MCV 88 86   RDW 16.4* 16.6*        Chemistries:  Recent Labs   Lab 07/10/22  0343 07/10/22  1033 07/10/22  1705 07/10/22  2137 07/11/22  0234   *  --   --   --  135*   K 3.8  3.7   < > 4.1 4.0 3.9   CL 89*  --   --   --  94*   CO2 31*  --   --   --  31*   BUN 30*  --   --   --  23*   CREATININE 1.3  --   --   --  1.2   CALCIUM 9.1  --   --   --  9.2   ALBUMIN 3.0*  --   --   --  2.9*   PROT 6.7  --   --   --  6.5   BILITOT 1.0  --   --   --  0.8   ALKPHOS 167*  --   --   --  156*   ALT 92*  --   --   --  73*   AST 48*  --   --   --  41*   MG 2.4   < > 2.2 2.1 2.1   PHOS 3.6  --   --   --   --     < > = values in this interval not displayed.       All pertinent labs within the past 24 hours have been reviewed.    Significant Imaging:  I have reviewed all pertinent imaging results/findings within the past 24 hours.    Assessment/Plan:     * Acute on chronic combined systolic and  diastolic congestive heart failure, NYHA class 4  Kevan Queen is a 37yoM with a history of polysubstance abuse who presented to the hospital with decompensated heart failure. He underwent LVAD, RVAD and MVr on 6/29/22. He is admitted to the ICU post-operatively.       Neuro/Psych:   -- Sedation: none  -- Pain: Added scheduled gabapentin, tizanidine, lidocaine patch; PRN Tramadol and dilaudid for breakthough  with improvement in pain control     Cards:   -- s/p LVAD, RVAD insertion and MVr. RVAD removed 7/1  -- HDS on epi 0.03, dobutamine off; Decrease to 0.03 today.  -- Adding midodrine 5mg TID  -- s/p chest closure 6/30   -- MAP goal 70-85  -- Off cardene, PRN hydral available, continue norvasc; Added midodrine      Pulm:   -- Goal O2 sat > 90%  -- ABG PRN  -- Extubated 7/1  -- Off nitric  -- daily CXR      Renal:  -- Trend BUN/Cr   -- lasix gtt at 7.5  -- Goal net negative under 1L       FEN / GI:   -- Replace lytes as needed  -- Nutrition: cardiac  -- GI ppx: famotidine  -- Bowel reg: miralax  -- Salt tabs in setting of hyponatremia, improving  -- Hepatology consult to acute liver failure, appreciate recs. Liver enzymes near normal.      ID:   -- Tm:afebrile; WBC stable  -- Dapto completed  -- Blood Cx from 7/2, remain negative      Heme/Onc:   -- H/H stable   -- Daily CBC  -- no intra-op product administered  -- 2u autologous  -- 1u pRBC, 1u FFP, 500mL albumin overnight (6/29)  -- Continue warfarin 4 mg today      Endo:   -- BG goal 140-180  -- SSI   -- endocrine consulted      PPx:   Feeding: cardiac  Analgesia/Sedation:none / PRN oxy,Fentanyl  Thromboembolic prevention: SCDs,   HOB >30: Yes  Stress Ulcer ppx: famotidine  Glucose control: Critical care goal 140-180 g/dl, ISS     Lines/Drains/Airway: R PICC, L brachial A-line; LVAD      Dispo/Code Status/Palliative:   -- SICU / Full Code.          Michael Quinn MD  Critical Care - Surgery  [facility] - [facility]

## 2022-07-11 NOTE — PT/OT/SLP EVAL
Physical Therapy Treatment    Patient Name:  Kevan Queen   MRN:  [id number]  Admit Date: 6/13/2022  Admitting Diagnosis:  Acute on chronic combined systolic and diastolic congestive heart failure, NYHA class 4   Length of Stay: 28 days  Recent Surgery: Procedure(s) (LRB):  CLOSURE, WOUND, STERNUM (N/A)  INSERTION-RIGHT VENTRICULAR ASSIST DEVICE (Right)  APPLICATION, WOUND VAC (N/A)  IRRIGATION, MEDIASTINUM 11 Days Post-Op    Recommendations:     Discharge Recommendations:  rehabilitation facility   Discharge Equipment Recommendations: none   Barriers to discharge: None    Plan:     During this hospitalization, patient to be seen 6 x/week to address the listed problems via gait training, therapeutic activities, therapeutic exercises, neuromuscular re-education  · Plan of Care Expires:  08/01/22   Plan of Care Reviewed with: patient, spouse    Assessment:     Kevan Queen is a 37 y.o. male admitted with a medical diagnosis of Acute on chronic combined systolic and diastolic congestive heart failure, NYHA class 4.   Patient found alert and cooperative with therapy. Patient presented with moderate SOB during ambulation, but vitals remained stable throughout session. Patient able to ambulate hallway distances with one person assistance. Patient has room to improve on proper pacing as patient tries to ambulate at a faster speed and push through SOB leading to unsteady gait. Progress next session to increase ambulation distance. Patient is appropriate for inpatient rehabilitation upon hospital discharge.       Problem List: weakness, impaired endurance, impaired functional mobilty, gait instability, impaired balance, pain, impaired cardiopulmonary response to activity, decreased safety awareness.  Rehab Prognosis: Good     GOALS:   Multidisciplinary Problems     Physical Therapy Goals        Problem: Physical Therapy    Goal Priority Disciplines Outcome Goal Variances Interventions   Physical Therapy Goal     PT, PT/OT  "Ongoing, Progressing     Description: Goals to be met by: 2022    Patient will increase functional independence with mobility by performin. Supine to sit with MInimal Assistance -not met  2. Sit to stand transfer with Minimal Assistance - met   2a. Sit to stand transfer with supervision  3. Gait  x 50 feet with Contact Guard Assistance -not met.                  Multidisciplinary Problems (Resolved)        Problem: Physical Therapy    Goal Priority Disciplines Outcome Goal Variances Interventions   Physical Therapy Goal   (Resolved)     PT, PT/OT Met     Description:     Problem: Physical Therapy  Goal: Physical Therapy Goal  Description: Goals to be met by: 2022     Patient will increase functional independence with mobility by performin. Lower extremity exercise program without IABP 30 reps per handout, with independence  2.Upper extremity exercise program 30 reps per handout, with independence                         Subjective   Communicated with RN prior to session.  Patient found up in chair upon PT entry to room, agreeable to evaluation. Kevan Queen's wife present during session.    Chief Complaint: "I'm ready to walk"   Patient/Family Comments/goals: to return to PLOF   Pain/Comfort:  · Pain Rating 1: 310  · Location 1: chest  · Pain Addressed 1: Reposition, Distraction  · Pain Rating Post-Intervention 1: 3/10    Objective:   Patient found with: PICC line, peripheral IV, arterial line, LVAD, blood pressure cuff, pulse ox, telemetry  General Precautions: Standard, Cardiac sternal, fall   Orthopedic Precautions:N/A   Braces: N/A   Oxygen Device: Nasal Cannula   Vitals: BP (!) 70/0 (BP Location: Left arm, Patient Position: Lying)   Pulse 100   Temp 98.4 °F (36.9 °C) (Oral)   Resp 19   Ht 6' 3" (1.905 m)   Wt 105.3 kg (232 lb 2.3 oz)   SpO2 100%   BMI 29.02 kg/m²     Outcome Measures:  AM-PAC 6 CLICK MOBILITY  Turning over in bed (including adjusting bedclothes, sheets and " blankets)?: 3  Sitting down on and standing up from a chair with arms (e.g., wheelchair, bedside commode, etc.): 3  Moving from lying on back to sitting on the side of the bed?: 3  Moving to and from a bed to a chair (including a wheelchair)?: 3  Need to walk in hospital room?: 3  Climbing 3-5 steps with a railing?: 2  Basic Mobility Total Score: 17     Cognition:   · Alert and Cooperative  · Command following: Follows multistep  commands  · Fluency: clear/fluent    Functional Mobility:  Additional staff present: OT and Supervising PT  Bed Mobility:    Not performed secondary to being found upright in chair.     Transfers:    Sit <> Stand Transfer: stand by assistance with no assistive device   o 1 trail from chair   o Patient attempted to use bilateral UE to push off from chair after recalling 2/3 sternal percautions; patient cued to cross arms over chest       Gait:  · Patient ambulated: 10ft + 32 ft + 32 ft with standing ADL's and standing rest break in between trials    · Patient required: standy by assistance  · Patient used: no assistive device  · Gait Pattern observed: reciprocal gait  · Gait Deviation(s): occasional unsteady gait  · Impairments due to: decreased strength, decreased endurance   · Comments:   · Mask donned; Portable monitor taken; RN notified   · Patient presented with moderate SOB prompting a standing rest break and he was educated on proper pacing         Therapeutic Activities, Exercises, and Education:   Patient educated on PT role and POC.  Patient educated on importance of ambulation and OOB activity.   Patient educated on sternal precautions.   Patient verbalized understanding.          Patient left up in chair with all lines intact, call button in reach and RN notified..    Time Tracking:     PT Received On: 07/11/22  PT Start Time: 0823     PT Stop Time: 0848  PT Total Time (min): 25 min       Billable Minutes:   · Gait Training 23    Treatment Type: Treatment  PT/PTA: PT

## 2022-07-11 NOTE — PLAN OF CARE
Problem: Physical Therapy  Goal: Physical Therapy Goal  Description: Goals to be met by: 2022    Patient will increase functional independence with mobility by performin. Supine to sit with MInimal Assistance -not met  2. Sit to stand transfer with Minimal Assistance - met   2a. Sit to stand transfer with supervision  3. Gait  x 50 feet with Contact Guard Assistance -not met.       Outcome: Ongoing, Progressing     Goal 2 met. Goal 2a created. Goal remain appropriate

## 2022-07-11 NOTE — PROGRESS NOTES
[facility] - [facility] Intensive [facility]  Heart Transplant  Progress Note    Patient Name: Kvean Queen  MRN: [id number]  Admission Date: 6/13/2022  Hospital Length of Stay: 28 days  Attending Physician: Luis F Paige MD  Primary Care Provider: ORALIA Cline  Principal Problem:Acute on chronic combined systolic and diastolic congestive heart failure, NYHA class 4    Subjective:     Interval History: Remains hypervolemic. Net -ve 1.5L/24hr and CVP 19 on lasix gtt at 7.5mg/hr. Na 135. Recommend increasing diuresis and discontinue salt tablets. Epi weaned to 0.03 with hypotension so midodrine 5 mg TID initiated. Due for formal ECHO after speed adjustment on 7/6. Progressing well with PT/OT. No complaints.         Continuous Infusions:   sodium chloride 0.9% 5 mL/hr at 06/27/22 0055    sodium chloride 0.9% Stopped (07/05/22 1314)    EPINEPHrine 0.03 mcg/kg/min (07/11/22 0800)    furosemide (LASIX) 2 mg/mL continuous infusion (non-titrating) 7.5 mg/hr (07/11/22 0800)    nicardipine Stopped (07/05/22 2204)     Scheduled Meds:   acetaminophen  650 mg Oral Q8H    amLODIPine  10 mg Oral Daily    ferrous gluconate  324 mg Oral Daily with breakfast    gabapentin  100 mg Oral TID    insulin aspart U-100  8 Units Subcutaneous TIDWM    insulin detemir U-100  15 Units Subcutaneous QHS    INV aspirin/placebo  100 mg Oral Daily    LIDOcaine  1 patch Transdermal Q24H    midodrine  5 mg Oral TID    oxymetazoline  2 spray Each Nostril BID    pantoprazole  40 mg Oral Daily    polyethylene glycol  17 g Oral Daily    potassium bicarbonate  50 mEq Oral BID    sodium chloride 0.9%  10 mL Intravenous Q6H    sodium chloride  1 g Oral BID    tiZANidine  2 mg Oral Q8H    warfarin  4 mg Oral Daily     PRN Meds:albumin human 5%, albuterol sulfate, bisacodyL, dextrose 10%, dextrose 10%, glucagon (human recombinant), glucose, glucose, hydrALAZINE, HYDROmorphone, insulin aspart U-100, magnesium hydroxide 400 mg/5 ml, magnesium  sulfate IVPB, potassium bicarbonate, potassium bicarbonate, potassium bicarbonate, sodium chloride 0.9%, sodium chloride 0.9%, Flushing PICC Protocol **AND** sodium chloride 0.9% **AND** sodium chloride 0.9%, traMADoL    Review of patient's allergies indicates:   Allergen Reactions    Aspirin Other (See Comments)     Mr. Thacker is enrolled in Dr. Paige's Alon Trial and cannot have any aspirin and/or aspirin-containing products. DO NOT cancel any orders for INV Aspirin 100 mg/Placebo. If you have questions, please contact Isabel @ [phone], [phone],[email or web address], secure chat or MS Teams message.    Bumex [bumetanide] Hives    Lactose Diarrhea     Other reaction(s): Abdominal distension, gaseous    Torsemide Hives     Objective:     Vital Signs (Most Recent):  Temp: 98.4 °F (36.9 °C) (07/11/22 0730)  Pulse: 94 (07/11/22 0800)  Resp: 20 (07/11/22 0800)  BP: (!) 70/0 (07/11/22 0300)  SpO2: 100 % (07/11/22 0730)   Vital Signs (24h Range):  Temp:  [98 °F (36.7 °C)-98.6 °F (37 °C)] 98.4 °F (36.9 °C)  Pulse:  [] 94  Resp:  [8-41] 20  SpO2:  [96 %-100 %] 100 %  BP: (70-84)/(0) 70/0  Arterial Line BP: ()/(64-84) 83/71     Patient Vitals for the past 72 hrs (Last 3 readings):   Weight   07/11/22 0600 105.3 kg (232 lb 2.3 oz)   07/10/22 0400 105 kg (231 lb 7.7 oz)   07/09/22 0500 104 kg (229 lb 4.5 oz)       Body mass index is 29.02 kg/m².      Intake/Output Summary (Last 24 hours) at 7/11/2022 0914  Last data filed at 7/11/2022 0800  Gross per 24 hour   Intake 965.84 ml   Output 2325 ml   Net -1359.16 ml         Hemodynamic Parameters:       Telemetry: ST    Physical Exam  Vitals and nursing note reviewed.   Constitutional:       Appearance: Normal appearance.      Comments: Sitting in chair   HENT:      Head: Normocephalic and atraumatic.   Eyes:      Extraocular Movements: Extraocular movements intact.      Conjunctiva/sclera: Conjunctivae normal.   Neck:      Comments: JVP visible at level of  ear sitting upright in chair  Cardiovascular:      Rate and Rhythm: Regular rhythm. Tachycardia present.      Comments: Smooth VAD hum  Pulmonary:      Breath sounds: Normal breath sounds.   Abdominal:      Palpations: Abdomen is soft.   Musculoskeletal:         General: Swelling present.      Cervical back: Neck supple.      Comments: Bilateral pedal edema (improved), now with compression stockings in place   Skin:     General: Skin is dry.      Capillary Refill: Capillary refill takes 2 to 3 seconds.   Neurological:      Mental Status: He is alert.       Significant Labs:  CBC:  Recent Labs   Lab 07/09/22  0355 07/10/22  0343 07/11/22  0234   WBC 14.65* 14.13* 13.73*   RBC 2.56* 2.63* 2.59*   HGB 6.9* 7.0* 7.0*   HCT 22.4* 23.2* 22.3*    419 437   MCV 88 88 86   MCH 27.0 26.6* 27.0   MCHC 30.8* 30.2* 31.4*       BNP:  Recent Labs   Lab 07/06/22  0402 07/08/22  0408 07/11/22  0234   * 677* 711*       CMP:  Recent Labs   Lab 07/09/22  0355 07/09/22  1115 07/10/22  0343 07/10/22  1033 07/10/22  1705 07/10/22  2137 07/11/22  0234   *  --  217*  --   --   --  119*   CALCIUM 9.0  --  9.1  --   --   --  9.2   ALBUMIN 3.0*  --  3.0*  --   --   --  2.9*   PROT 6.9  --  6.7  --   --   --  6.5   *  --  130*  --   --   --  135*   K 4.1   < > 3.8  3.7   < > 4.1 4.0 3.9   CO2 29  --  31*  --   --   --  31*   CL 88*  --  89*  --   --   --  94*   BUN 34*  --  30*  --   --   --  23*   CREATININE 1.3  --  1.3  --   --   --  1.2   ALKPHOS 171*  --  167*  --   --   --  156*   *  --  92*  --   --   --  73*   AST 53*  --  48*  --   --   --  41*   BILITOT 1.2*  --  1.0  --   --   --  0.8    < > = values in this interval not displayed.        Coagulation:   Recent Labs   Lab 07/09/22  0355 07/09/22  0740 07/10/22  0343 07/11/22  0234   INR  --  3.7* 2.4* 2.2*   APTT 35.9*  --  34.5* 32.3*       LDH:  Recent Labs   Lab 07/09/22  0355 07/10/22  0343 07/11/22  0234   * 439* 442*  "      Microbiology:  Microbiology Results (last 7 days)       Procedure Component Value Units Date/Time    Blood culture [[id number]] Collected: 07/02/22 1629    Order Status: Completed Specimen: Blood from Peripheral, Hand, Right Updated: 07/07/22 1812     Blood Culture, Routine No growth after 5 days.    Blood culture [[id number]] Collected: 07/02/22 1618    Order Status: Completed Specimen: Blood from Peripheral, Antecubital, Right Updated: 07/07/22 1812     Blood Culture, Routine No growth after 5 days.    Blood culture [[id number]] Collected: 06/30/22 1836    Order Status: Completed Specimen: Blood from Peripheral, Wrist, Left Updated: 07/05/22 2012     Blood Culture, Routine No growth after 5 days.    Blood culture [[id number]] Collected: 06/30/22 1833    Order Status: Completed Specimen: Blood from Peripheral, Forearm, Right Updated: 07/05/22 2012     Blood Culture, Routine No growth after 5 days.            I have reviewed all pertinent labs within the past 24 hours.    Estimated Creatinine Clearance: 110.6 mL/min (based on SCr of 1.2 mg/dL).    Diagnostic Results:  I have reviewed and interpreted all pertinent imaging results/findings within the past 24 hours.    Assessment and Plan:     36 yo male with NIDCM with BiV systolic heart failure, on home Milrinone at 0.375 mcg/kg/min, presented at [facility] 4/2/22 ,was not evaluated for OHT as he has recently quit smoking in April 2022 but was approved for VAD with plan to begin OHT evaluation in upcoming months if Mr Queen is stable and suitable for OHT eval (blood group A), 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 Went to ED at [facility] " 6/10 but left after waiting 4 hours. Had clinic appointment with us today, but arrived to [facility] early this morning and decided to go to the ED instead. Baseline Lasix dose is 80 mg bid. Reports taking 240 mg qd past 3 days with no improvement. BNP is 1701, up from 898 on 6/2 and 49 on 5/24. sCr is 1.8 with baseline ~ 1.5-1.7. sPO2 on RA is 93%. Wife at bedside    He has been given Lasix 80 mg IVP in the ED with plan to start Lasix gtt at 20 mg/hr           * Acute on chronic combined systolic and diastolic congestive heart failure, NYHA class 4  - [facility]  - Was not evaluated for OHTx as he quit smoking in April 2022.   - Received HM3 on 6/29  - Hypervolemic on Lasix gtt. Recommend increasing lasix gtt for more aggressive diuresis and discontinuing salt tablets (now with improved hyponatremia)  - Need for formal ECHO after LVAD speed change 7/6/22   - See LVAD    Hyponatremia  -hypervolemic, now improved  -Improvement in Na with diuresis, recommend continued diuresis and discontinuation of salt tabs in setting of volume overload    LVAD (left ventricular assist device) present  -S/P DT HM3 with MVR plus Protek Duo for RV support 6/29 (Grecia)  -Rvad removed with concern for hemolysis, Marcella increased to 20,  -Post op antibiotic per CTS (h/o Staph epi bacteremia, cleareed by ID for surgery with rec to continue antibiotic coverage for 2 weeks post op)  -AC GASTON trial   -Speed set at 5300 (increased to 5300 on 7/6)   -Echo done 7/5 EF: 10%, LVEDD: 7.13 with speed at 5200. Need for formal ECHO   -Would increase lasix drip (+ consider adding metolazone/tolvaptan and d/c salt tablets)  - off.  Would consider Primacor if needed for inotropic support   -AC per primary.    Procedure: Device Interrogation Including analysis of device parameters  Current Settings: Ventricular Assist Device  Review of device function is stable    TXP LVAD INTERROGATIONS 7/11/2022 7/11/2022 7/11/2022 7/11/2022 7/11/2022 7/11/2022 7/11/2022   Type  HeartMate3 HeartMate3 - - - HeartMate3 -   Flow 4.6 4.6 4.5 4.6 4.7 4.6 4.7   Speed 5300 5300 5300 5300 5300 5300 5300   PI 2.8 2.9 3.3 3.2 3.1 3.1 3.1   Power (Serna) 3.8 3.8 3.9 3.8 3.8 3.8 3.8   LSL 4900 4900 4900 4900 4900 4900 4900   Pulsatility - Intermittent pulse - - - Intermittent pulse -       Staphylococcus epidermidis bacteremia  -Blood cultures 6/20 and repeat 6/21 positive for Staph Epi. One of two blood cultures from 6/23 positive for Staph (taken prior to line exchange). Repeat cultures sent 6/25 NGTD.   -IJ exchanged on 6/23, IABP exchanged 6/23  -ID recommended 14 day course of vancomycin from VAD implantation on 6/29 with end date: 7/12/22. Vancomycin discontinued per CTS with concerns for elevated sCr. ID with new recs to complete 7d course of daptomycin, which was completed 7/7/22.        Uncontrolled diabetes mellitus  Admitted 5/24-5/27 with hyperglycemia hyperosmolar syndrome  -Hgb A1C 9.2 on 5/20/22  -Endocrine following, on insulin gtt    CKD (chronic kidney disease) stage 3, GFR 30-59 ml/min  SCr baseline ~ 1.5-1.7        Uninterrupted Critical Care/Counseling Time (not including procedures): 60 minutes      Denise Espinoza PA-C  Heart Transplant  [facility]

## 2022-07-11 NOTE — SUBJECTIVE & OBJECTIVE
Interval History/Significant Events: NAEON. Epi down to 0.03.     Follow-up For: Procedure(s) (LRB):  CLOSURE, WOUND, STERNUM (N/A)  INSERTION-RIGHT VENTRICULAR ASSIST DEVICE (Right)  APPLICATION, WOUND VAC (N/A)  IRRIGATION, MEDIASTINUM    Post-Operative Day: 11 Days Post-Op    Objective:     Vital Signs (Most Recent):  Temp: 98.3 °F (36.8 °C) (07/11/22 0300)  Pulse: 100 (07/11/22 0630)  Resp: (!) 32 (07/11/22 0630)  BP: (!) 70/0 (07/11/22 0300)  SpO2: 96 % (07/10/22 2300)   Vital Signs (24h Range):  Temp:  [98 °F (36.7 °C)-98.6 °F (37 °C)] 98.3 °F (36.8 °C)  Pulse:  [] 100  Resp:  [8-41] 32  SpO2:  [96 %-100 %] 96 %  BP: (70-84)/(0) 70/0  Arterial Line BP: ()/() 86/75     Weight: 105.3 kg (232 lb 2.3 oz)  Body mass index is 29.02 kg/m².      Intake/Output Summary (Last 24 hours) at 7/11/2022 0713  Last data filed at 7/11/2022 0600  Gross per 24 hour   Intake 1108.79 ml   Output 2650 ml   Net -1541.21 ml       Physical Exam  Vitals reviewed.   Constitutional:       General: He is not in acute distress.  HENT:      Head: Normocephalic and atraumatic.   Eyes:      Extraocular Movements: Extraocular movements intact.   Neck:      Comments:     Cardiovascular:      Rate and Rhythm: Normal rate.      Pulses: Normal pulses.      Comments: S/p chest closure. Incision c/d/I with iodine gauze    LVAD speed 5300  Flows ~4L    L brachial art line      Pulmonary:      Effort: Pulmonary effort is normal. No respiratory distress.   Abdominal:      General: Abdomen is flat. There is no distension.      Palpations: Abdomen is soft.   Musculoskeletal:      Comments: R PICC   Skin:     General: Skin is warm and dry.      Capillary Refill: Capillary refill takes less than 2 seconds.   Neurological:      General: No focal deficit present.      Mental Status: He is alert and oriented to person, place, and time.     Lines/Drains/Airways       Peripherally Inserted Central Catheter Line  Duration             PICC Triple  Lumen 07/05/22 1554 right brachial 5 days              Arterial Line  Duration             Arterial Line 06/29/22 0832 Left Brachial 11 days              Line  Duration                  VAD 06/29/22 1115 Left ventricular assist device HeartMate 3 11 days              Peripheral Intravenous Line  Duration                  Peripheral IV - Single Lumen 07/08/22 0300 20 G Anterior;Right Forearm 3 days                    Significant Labs:    CBC/Anemia Profile:  Recent Labs   Lab 07/10/22  0343 07/11/22  0234   WBC 14.13* 13.73*   HGB 7.0* 7.0*   HCT 23.2* 22.3*    437   MCV 88 86   RDW 16.4* 16.6*        Chemistries:  Recent Labs   Lab 07/10/22  0343 07/10/22  1033 07/10/22  1705 07/10/22  2137 07/11/22  0234   *  --   --   --  135*   K 3.8  3.7   < > 4.1 4.0 3.9   CL 89*  --   --   --  94*   CO2 31*  --   --   --  31*   BUN 30*  --   --   --  23*   CREATININE 1.3  --   --   --  1.2   CALCIUM 9.1  --   --   --  9.2   ALBUMIN 3.0*  --   --   --  2.9*   PROT 6.7  --   --   --  6.5   BILITOT 1.0  --   --   --  0.8   ALKPHOS 167*  --   --   --  156*   ALT 92*  --   --   --  73*   AST 48*  --   --   --  41*   MG 2.4   < > 2.2 2.1 2.1   PHOS 3.6  --   --   --   --     < > = values in this interval not displayed.       All pertinent labs within the past 24 hours have been reviewed.    Significant Imaging:  I have reviewed all pertinent imaging results/findings within the past 24 hours.

## 2022-07-11 NOTE — ASSESSMENT & PLAN NOTE
- NIDCM  - Was not evaluated for OHTx as he quit smoking in April 2022.   - Received HM3 on 6/29  - Hypervolemic on Lasix gtt. Recommend increasing lasix gtt for more aggressive diuresis and discontinuing salt tablets (now with improved hyponatremia)  - Need for formal ECHO after LVAD speed change 7/6/22   - See LVAD

## 2022-07-11 NOTE — ASSESSMENT & PLAN NOTE
-hypervolemic, now improved  -Improvement in Na with diuresis, recommend continued diuresis and discontinuation of salt tabs in setting of volume overload

## 2022-07-11 NOTE — PROGRESS NOTES
Pt aaox3 while sitting up in bed with family at bedside.  LVAD history interrogated with no abnormal events noted.  LVAD numbers WNL. Approximately 30 minutes was spent with the patient providing education. Pt denies any needs at this time. LVAD education continued at bedside with patient and caregiver. Patient educated on below information:   VAD Binder given to patient to fill out. Reviewed with patient the workbook, encouraged patient to work through while learning, Stated that they are planning on working on this when they stepdown which should be soon.   : Reviewed in depth the . Patient was shown the display button, silence button, and battery button and what each button does including silencing alarms, checking battery status, and toggle through LCD screen. Reviewed how to preform self  test and review last 6 alarms. Also reviewed with patient the lights and sounds the controller makes during alarms   MODULAR CABLE/ DLES: Reviewed with patient their dressing change information and how to rate their DLES. Discussed with patient how to turn modular cable and properly care for as well as explained why patient needs to properly care for equipment.   EMERGENCY BAG: Reviewed with patient the importance of emergency bag. Showed patient what should be in the bag at all times including backup controller, 2 batteries, 2 clips, and emergency cards. Reviewed that patient should have bag with them at all times.   BATTERIES AND : Reviewed with patient the battery charger, batteries, and battery clips. Patient was shown how to properly charge batteries, discussed battery rotation, and proper connection on battery clips. Demonstrated how to check battery level on both battery and . Reviewed  alarms and lights and how to troubleshoot issues. Reviewed how long batteries last and calibration.    ALARMS: Reviewed all alarms with patient. Explained  red heart and yellow  wrench, and battery alarms briefly.   PAGING VS CALLING: Educated patient on Contact information sheet. Thoroughly explained the difference between Paging vs. Calling the coordinators.   Pt verbalized understanding. Many of the information was a review.   Encouraged pt to notify nurse if they have any questions, problems or concerns for LVAD coordinator.  Pt verbalized understanding and in agreement of plan. Will follow up with pt soon.    PATIENT IS NOT CHECKED OFF ON ALARMS  CAREGIVER IS NOT CHECKED OFF ON DRESSING CHANGE

## 2022-07-11 NOTE — CARE UPDATE
BG goal 140-180    -A1C   Lab Results   Component Value Date    HGBA1C 9.2 (H) 05/20/2022         -HOME REGIMEN: Detemir  23  units daily and Aspart   12  units TIDWM and  Mod dose correction insulin    -INPATIENT REGIMEN: levemir 15 units daily and novolog 8 units with meals.     -GLUCOSE TREND FOR THE PAST 24HRS: 143-232    -NO HYPOGYCEMIAS NOTED     -TOLERATING 25-50% OF PO DIET     -Diet: Diet Adult Regular (IDDSI Level 7) [facility]; Consistent Carbohydrate; Fluid - 1500mL        Remains in SICU, NAEON. Creatinine 1.2. BG reasonably well controlled with current regimen.   FBG remains above goal.       Plan:  Increase to levemir 18 units daily.   continue novolog 8 units with meals.   BG monitoring ac/hs and moderate dose correction scale.     Discharge planning:tbd     Endocrine to continue to follow    ** Please call Endocrine for any BG related issues **

## 2022-07-11 NOTE — PROGRESS NOTES
Attempted to see patient, dressing change being completed during this time. Will try another time.

## 2022-07-12 LAB
ALBUMIN SERPL BCP-MCNC: 3 G/DL (ref 3.5–5.2)
ALLENS TEST: ABNORMAL
ALLENS TEST: ABNORMAL
ALP SERPL-CCNC: 161 U/L (ref 55–135)
ALT SERPL W/O P-5'-P-CCNC: 62 U/L (ref 10–44)
ANION GAP SERPL CALC-SCNC: 14 MMOL/L (ref 8–16)
APTT BLDCRRT: 33.1 SEC (ref 21–32)
ASCENDING AORTA: 3.46 CM
AST SERPL-CCNC: 40 U/L (ref 10–40)
BASOPHILS # BLD AUTO: 0.03 K/UL (ref 0–0.2)
BASOPHILS NFR BLD: 0.2 % (ref 0–1.9)
BILIRUB SERPL-MCNC: 0.9 MG/DL (ref 0.1–1)
BSA FOR ECHO PROCEDURE: 2.36 M2
BUN SERPL-MCNC: 15 MG/DL (ref 6–20)
CALCIUM SERPL-MCNC: 8.9 MG/DL (ref 8.7–10.5)
CHLORIDE SERPL-SCNC: 92 MMOL/L (ref 95–110)
CO2 SERPL-SCNC: 29 MMOL/L (ref 23–29)
CREAT SERPL-MCNC: 1.3 MG/DL (ref 0.5–1.4)
CV ECHO LV RWT: 0.42 CM
DELSYS: ABNORMAL
DIFFERENTIAL METHOD: ABNORMAL
DOP CALC LVOT AREA: 3.3 CM2
DOP CALC LVOT DIAMETER: 2.06 CM
DOP CALC MV VTI: 18.5 CM
E WAVE DECELERATION TIME: 179.57 MSEC
E/A RATIO: 1.37
E/E' RATIO: 39 M/S
ECHO LV POSTERIOR WALL: 1.05 CM (ref 0.6–1.1)
EJECTION FRACTION: 10 %
EOSINOPHIL # BLD AUTO: 0.1 K/UL (ref 0–0.5)
EOSINOPHIL NFR BLD: 0.5 % (ref 0–8)
ERYTHROCYTE [DISTWIDTH] IN BLOOD BY AUTOMATED COUNT: 17.1 % (ref 11.5–14.5)
EST. GFR  (AFRICAN AMERICAN): >60 ML/MIN/1.73 M^2
EST. GFR  (NON AFRICAN AMERICAN): >60 ML/MIN/1.73 M^2
FIBRINOGEN PPP-MCNC: 580 MG/DL (ref 182–400)
FRACTIONAL SHORTENING: 9 % (ref 28–44)
GLUCOSE SERPL-MCNC: 186 MG/DL (ref 70–110)
HCO3 UR-SCNC: 32.9 MMOL/L (ref 24–28)
HCO3 UR-SCNC: 34.2 MMOL/L (ref 24–28)
HCT VFR BLD AUTO: 22.9 % (ref 40–54)
HGB BLD-MCNC: 7.1 G/DL (ref 14–18)
IMM GRANULOCYTES # BLD AUTO: 0.05 K/UL (ref 0–0.04)
IMM GRANULOCYTES NFR BLD AUTO: 0.4 % (ref 0–0.5)
INR PPP: 2.4 (ref 0.8–1.2)
INTERVENTRICULAR SEPTUM: 1.34 CM (ref 0.6–1.1)
LDH SERPL L TO P-CCNC: 434 U/L (ref 110–260)
LEFT ATRIUM SIZE: 2.25 CM
LEFT INTERNAL DIMENSION IN SYSTOLE: 4.57 CM (ref 2.1–4)
LEFT VENTRICLE DIASTOLIC VOLUME INDEX: 50.97 ML/M2
LEFT VENTRICLE DIASTOLIC VOLUME: 119.27 ML
LEFT VENTRICLE MASS INDEX: 100 G/M2
LEFT VENTRICLE SYSTOLIC VOLUME INDEX: 40.9 ML/M2
LEFT VENTRICLE SYSTOLIC VOLUME: 95.62 ML
LEFT VENTRICULAR INTERNAL DIMENSION IN DIASTOLE: 5.02 CM (ref 3.5–6)
LEFT VENTRICULAR MASS: 233.86 G
LV LATERAL E/E' RATIO: 39 M/S
LV SEPTAL E/E' RATIO: 39 M/S
LYMPHOCYTES # BLD AUTO: 1.4 K/UL (ref 1–4.8)
LYMPHOCYTES NFR BLD: 10.6 % (ref 18–48)
MAGNESIUM SERPL-MCNC: 1.7 MG/DL (ref 1.6–2.6)
MAGNESIUM SERPL-MCNC: 1.8 MG/DL (ref 1.6–2.6)
MAGNESIUM SERPL-MCNC: 1.9 MG/DL (ref 1.6–2.6)
MAGNESIUM SERPL-MCNC: 2 MG/DL (ref 1.6–2.6)
MCH RBC QN AUTO: 26.9 PG (ref 27–31)
MCHC RBC AUTO-ENTMCNC: 31 G/DL (ref 32–36)
MCV RBC AUTO: 87 FL (ref 82–98)
MONOCYTES # BLD AUTO: 1.1 K/UL (ref 0.3–1)
MONOCYTES NFR BLD: 8.2 % (ref 4–15)
MV MEAN GRADIENT: 1 MMHG
MV PEAK A VEL: 0.57 M/S
MV PEAK E VEL: 0.78 M/S
MV PEAK GRADIENT: 4 MMHG
MV STENOSIS PRESSURE HALF TIME: 52.08 MS
MV VALVE AREA P 1/2 METHOD: 4.22 CM2
NEUTROPHILS # BLD AUTO: 10.9 K/UL (ref 1.8–7.7)
NEUTROPHILS NFR BLD: 80.1 % (ref 38–73)
NRBC BLD-RTO: 0 /100 WBC
PCO2 BLDA: 51.3 MMHG (ref 35–45)
PCO2 BLDA: 52.9 MMHG (ref 35–45)
PH SMN: 7.4 [PH] (ref 7.35–7.45)
PH SMN: 7.43 [PH] (ref 7.35–7.45)
PISA TR MAX VEL: 2.17 M/S
PLATELET # BLD AUTO: 433 K/UL (ref 150–450)
PMV BLD AUTO: 9.7 FL (ref 9.2–12.9)
PO2 BLDA: 25 MMHG (ref 40–60)
PO2 BLDA: 25 MMHG (ref 40–60)
POC BE: 10 MMOL/L
POC BE: 8 MMOL/L
POC SATURATED O2: 45 % (ref 95–100)
POC SATURATED O2: 45 % (ref 95–100)
POC TCO2: 34 MMOL/L (ref 24–29)
POC TCO2: 36 MMOL/L (ref 24–29)
POCT GLUCOSE: 121 MG/DL (ref 70–110)
POCT GLUCOSE: 135 MG/DL (ref 70–110)
POCT GLUCOSE: 190 MG/DL (ref 70–110)
POCT GLUCOSE: 226 MG/DL (ref 70–110)
POTASSIUM SERPL-SCNC: 2.9 MMOL/L (ref 3.5–5.1)
POTASSIUM SERPL-SCNC: 3.1 MMOL/L (ref 3.5–5.1)
POTASSIUM SERPL-SCNC: 3.2 MMOL/L (ref 3.5–5.1)
POTASSIUM SERPL-SCNC: 3.3 MMOL/L (ref 3.5–5.1)
POTASSIUM SERPL-SCNC: 3.3 MMOL/L (ref 3.5–5.1)
PROT SERPL-MCNC: 6.8 G/DL (ref 6–8.4)
PROTHROMBIN TIME: 24 SEC (ref 9–12.5)
RBC # BLD AUTO: 2.64 M/UL (ref 4.6–6.2)
RV TISSUE DOPPLER FREE WALL SYSTOLIC VELOCITY 1 (APICAL 4 CHAMBER VIEW): 3.04 CM/S
SAMPLE: ABNORMAL
SAMPLE: ABNORMAL
SINUS: 3.2 CM
SITE: ABNORMAL
SITE: ABNORMAL
SODIUM SERPL-SCNC: 135 MMOL/L (ref 136–145)
STJ: 2.62 CM
TDI LATERAL: 0.02 M/S
TDI SEPTAL: 0.02 M/S
TDI: 0.02 M/S
TR MAX PG: 19 MMHG
TRICUSPID ANNULAR PLANE SYSTOLIC EXCURSION: 0.9 CM
WBC # BLD AUTO: 13.64 K/UL (ref 3.9–12.7)

## 2022-07-12 PROCEDURE — 85730 THROMBOPLASTIN TIME PARTIAL: CPT | Performed by: STUDENT IN AN ORGANIZED HEALTH CARE EDUCATION/TRAINING PROGRAM

## 2022-07-12 PROCEDURE — 97530 THERAPEUTIC ACTIVITIES: CPT

## 2022-07-12 PROCEDURE — 99233 PR SUBSEQUENT HOSPITAL CARE,LEVL III: ICD-10-PCS | Mod: ,,, | Performed by: INTERNAL MEDICINE

## 2022-07-12 PROCEDURE — 83615 LACTATE (LD) (LDH) ENZYME: CPT | Performed by: THORACIC SURGERY (CARDIOTHORACIC VASCULAR SURGERY)

## 2022-07-12 PROCEDURE — 85384 FIBRINOGEN ACTIVITY: CPT | Performed by: STUDENT IN AN ORGANIZED HEALTH CARE EDUCATION/TRAINING PROGRAM

## 2022-07-12 PROCEDURE — 99291 PR CRITICAL CARE, E/M 30-74 MINUTES: ICD-10-PCS | Mod: FS,,, | Performed by: INTERNAL MEDICINE

## 2022-07-12 PROCEDURE — 99291 CRITICAL CARE FIRST HOUR: CPT | Mod: FS,,, | Performed by: INTERNAL MEDICINE

## 2022-07-12 PROCEDURE — 85025 COMPLETE CBC W/AUTO DIFF WBC: CPT | Performed by: THORACIC SURGERY (CARDIOTHORACIC VASCULAR SURGERY)

## 2022-07-12 PROCEDURE — 25000003 PHARM REV CODE 250: Performed by: INTERNAL MEDICINE

## 2022-07-12 PROCEDURE — 25000003 PHARM REV CODE 250

## 2022-07-12 PROCEDURE — 85610 PROTHROMBIN TIME: CPT | Performed by: PHYSICIAN ASSISTANT

## 2022-07-12 PROCEDURE — 25000003 PHARM REV CODE 250: Performed by: STUDENT IN AN ORGANIZED HEALTH CARE EDUCATION/TRAINING PROGRAM

## 2022-07-12 PROCEDURE — A4216 STERILE WATER/SALINE, 10 ML: HCPCS | Performed by: THORACIC SURGERY (CARDIOTHORACIC VASCULAR SURGERY)

## 2022-07-12 PROCEDURE — 27000248 HC VAD-ADDITIONAL DAY

## 2022-07-12 PROCEDURE — 80053 COMPREHEN METABOLIC PANEL: CPT

## 2022-07-12 PROCEDURE — 25000003 PHARM REV CODE 250: Performed by: THORACIC SURGERY (CARDIOTHORACIC VASCULAR SURGERY)

## 2022-07-12 PROCEDURE — 97116 GAIT TRAINING THERAPY: CPT

## 2022-07-12 PROCEDURE — 63600175 PHARM REV CODE 636 W HCPCS: Performed by: STUDENT IN AN ORGANIZED HEALTH CARE EDUCATION/TRAINING PROGRAM

## 2022-07-12 PROCEDURE — 84132 ASSAY OF SERUM POTASSIUM: CPT | Mod: 91

## 2022-07-12 PROCEDURE — 93750 PR INTERROGATE VENT ASSIST DEV, IN PERSON, W PHYSICIAN ANALYSIS: ICD-10-PCS | Mod: ,,, | Performed by: INTERNAL MEDICINE

## 2022-07-12 PROCEDURE — 83735 ASSAY OF MAGNESIUM: CPT

## 2022-07-12 PROCEDURE — 93750 INTERROGATION VAD IN PERSON: CPT | Mod: ,,, | Performed by: INTERNAL MEDICINE

## 2022-07-12 PROCEDURE — 99900035 HC TECH TIME PER 15 MIN (STAT)

## 2022-07-12 PROCEDURE — 94761 N-INVAS EAR/PLS OXIMETRY MLT: CPT

## 2022-07-12 PROCEDURE — 20000000 HC ICU ROOM

## 2022-07-12 PROCEDURE — 99233 SBSQ HOSP IP/OBS HIGH 50: CPT | Mod: ,,, | Performed by: INTERNAL MEDICINE

## 2022-07-12 PROCEDURE — 25000003 PHARM REV CODE 250: Performed by: ANESTHESIOLOGY

## 2022-07-12 PROCEDURE — 82803 BLOOD GASES ANY COMBINATION: CPT

## 2022-07-12 RX ORDER — FAMOTIDINE 20 MG/1
40 TABLET, FILM COATED ORAL DAILY
Status: DISCONTINUED | OUTPATIENT
Start: 2022-07-13 | End: 2022-07-28 | Stop reason: HOSPADM

## 2022-07-12 RX ORDER — OXYCODONE HYDROCHLORIDE 10 MG/1
10 TABLET ORAL EVERY 6 HOURS PRN
Status: DISCONTINUED | OUTPATIENT
Start: 2022-07-12 | End: 2022-07-28 | Stop reason: HOSPADM

## 2022-07-12 RX ORDER — WARFARIN 4 MG/1
4 TABLET ORAL DAILY
Status: DISCONTINUED | OUTPATIENT
Start: 2022-07-12 | End: 2022-07-14

## 2022-07-12 RX ORDER — FUROSEMIDE 10 MG/ML
80 INJECTION INTRAMUSCULAR; INTRAVENOUS ONCE
Status: COMPLETED | OUTPATIENT
Start: 2022-07-13 | End: 2022-07-13

## 2022-07-12 RX ORDER — TRAMADOL HYDROCHLORIDE 50 MG/1
50 TABLET ORAL EVERY 6 HOURS PRN
Status: DISCONTINUED | OUTPATIENT
Start: 2022-07-12 | End: 2022-07-28 | Stop reason: HOSPADM

## 2022-07-12 RX ADMIN — Medication 10 ML: at 06:07

## 2022-07-12 RX ADMIN — INSULIN ASPART 8 UNITS: 100 INJECTION, SOLUTION INTRAVENOUS; SUBCUTANEOUS at 05:07

## 2022-07-12 RX ADMIN — FUROSEMIDE 20 MG/HR: 10 INJECTION, SOLUTION INTRAMUSCULAR; INTRAVENOUS at 02:07

## 2022-07-12 RX ADMIN — POLYETHYLENE GLYCOL 3350 17 G: 17 POWDER, FOR SOLUTION ORAL at 08:07

## 2022-07-12 RX ADMIN — ACETAMINOPHEN 650 MG: 325 TABLET ORAL at 05:07

## 2022-07-12 RX ADMIN — INSULIN ASPART 2 UNITS: 100 INJECTION, SOLUTION INTRAVENOUS; SUBCUTANEOUS at 08:07

## 2022-07-12 RX ADMIN — POTASSIUM BICARBONATE 50 MEQ: 978 TABLET, EFFERVESCENT ORAL at 08:07

## 2022-07-12 RX ADMIN — INSULIN ASPART 8 UNITS: 100 INJECTION, SOLUTION INTRAVENOUS; SUBCUTANEOUS at 08:07

## 2022-07-12 RX ADMIN — Medication 10 ML: at 12:07

## 2022-07-12 RX ADMIN — POTASSIUM BICARBONATE 60 MEQ: 391 TABLET, EFFERVESCENT ORAL at 05:07

## 2022-07-12 RX ADMIN — INSULIN ASPART 3 UNITS: 100 INJECTION, SOLUTION INTRAVENOUS; SUBCUTANEOUS at 09:07

## 2022-07-12 RX ADMIN — OXYCODONE HYDROCHLORIDE 10 MG: 10 TABLET ORAL at 10:07

## 2022-07-12 RX ADMIN — MAGNESIUM SULFATE HEPTAHYDRATE 2 G: 40 INJECTION, SOLUTION INTRAVENOUS at 11:07

## 2022-07-12 RX ADMIN — TIZANIDINE 2 MG: 2 TABLET ORAL at 02:07

## 2022-07-12 RX ADMIN — POTASSIUM BICARBONATE 50 MEQ: 978 TABLET, EFFERVESCENT ORAL at 09:07

## 2022-07-12 RX ADMIN — FUROSEMIDE 20 MG/HR: 10 INJECTION, SOLUTION INTRAMUSCULAR; INTRAVENOUS at 09:07

## 2022-07-12 RX ADMIN — TIZANIDINE 2 MG: 2 TABLET ORAL at 09:07

## 2022-07-12 RX ADMIN — Medication 10 ML: at 05:07

## 2022-07-12 RX ADMIN — POTASSIUM BICARBONATE 60 MEQ: 391 TABLET, EFFERVESCENT ORAL at 03:07

## 2022-07-12 RX ADMIN — POTASSIUM BICARBONATE 35 MEQ: 391 TABLET, EFFERVESCENT ORAL at 10:07

## 2022-07-12 RX ADMIN — POTASSIUM BICARBONATE 35 MEQ: 391 TABLET, EFFERVESCENT ORAL at 11:07

## 2022-07-12 RX ADMIN — HYDROMORPHONE HYDROCHLORIDE 0.2 MG: 1 INJECTION, SOLUTION INTRAMUSCULAR; INTRAVENOUS; SUBCUTANEOUS at 04:07

## 2022-07-12 RX ADMIN — GABAPENTIN 100 MG: 100 CAPSULE ORAL at 09:07

## 2022-07-12 RX ADMIN — GABAPENTIN 100 MG: 100 CAPSULE ORAL at 02:07

## 2022-07-12 RX ADMIN — INSULIN ASPART 8 UNITS: 100 INJECTION, SOLUTION INTRAVENOUS; SUBCUTANEOUS at 12:07

## 2022-07-12 RX ADMIN — WARFARIN SODIUM 4 MG: 4 TABLET ORAL at 05:07

## 2022-07-12 RX ADMIN — TIZANIDINE 2 MG: 2 TABLET ORAL at 05:07

## 2022-07-12 RX ADMIN — FUROSEMIDE 20 MG/HR: 10 INJECTION, SOLUTION INTRAMUSCULAR; INTRAVENOUS at 04:07

## 2022-07-12 RX ADMIN — Medication 324 MG: at 08:07

## 2022-07-12 RX ADMIN — ACETAMINOPHEN 650 MG: 325 TABLET ORAL at 02:07

## 2022-07-12 RX ADMIN — OXYCODONE HYDROCHLORIDE 10 MG: 10 TABLET ORAL at 09:07

## 2022-07-12 RX ADMIN — GABAPENTIN 100 MG: 100 CAPSULE ORAL at 08:07

## 2022-07-12 RX ADMIN — PANTOPRAZOLE SODIUM 40 MG: 40 TABLET, DELAYED RELEASE ORAL at 08:07

## 2022-07-12 RX ADMIN — INSULIN ASPART 4 UNITS: 100 INJECTION, SOLUTION INTRAVENOUS; SUBCUTANEOUS at 12:07

## 2022-07-12 RX ADMIN — ACETAMINOPHEN 650 MG: 325 TABLET ORAL at 09:07

## 2022-07-12 NOTE — ASSESSMENT & PLAN NOTE
- NIDCM  - Was not evaluated for OHTx as he quit smoking in April 2022.   - Received HM3 on 6/29  - Hypervolemic on Lasix gtt, increased to 20 mg/hr overnight  - Net -ve 4.1 L/24 hours   - SVO2 45, CO 7.4, CI 3.1 on epi 0.03. Midodrine discontinued yesterday   - Formal ECHO today after LVAD speed change 7/11/22  - See LVAD

## 2022-07-12 NOTE — PLAN OF CARE
Hemodynamics Care Update Note       SVO2: 44  CVP: 21 (obtained via PICC)  CO: 7.5  CI: 3.2  SVR: 682.6     On Epi 0.03 and lasix gtt at 10 mg/hr  (calculated using oxygen consumption constant of 3.5)       Plan:  Will administer a bolus of IV lasix at 80 mg followed by increasing the rate to 20 mg/hr    Case discussed with on call HTS attending.    Donn Baker, PGY4  Cardiovascular Disease  [facility]

## 2022-07-12 NOTE — PROGRESS NOTES
Preventative maintenance completed on patient's [id number] and [id number], thus patient educated on both equipment. Patient agreed and verbalized understanding. Patient also educated on LVAD batteries and their usage. Will continue to follow up on patient and provide education and equipment assistance till discharge

## 2022-07-12 NOTE — PROGRESS NOTES
[facility] - Surgical Intensive Care  Heart Transplant  Progress Note    Patient Name: Kevan Queen  MRN: [id number]  Admission Date: 6/13/2022  Hospital Length of Stay: 29 days  Attending Physician: Josh Pulido MD  Primary Care Provider: ORALIA Cline  Principal Problem:Acute on chronic combined systolic and diastolic congestive heart failure, NYHA class 4    Subjective:     Interval History: HTS became primary team yesterday. LVAD speed decreased to 5200 rpm yesterday AM, ECHO today. IV Lasix gtt increased to 20 mg/hr overnight. Net -ve 4.1 L/24 hours. Replace electrolytes aggressively. Discontinued midodrine, remains on epi 0.03 with MAPs in mid 70s-80s. SVO2 45, CO 7.4, CI 3.1. No complaints.         Continuous Infusions:   sodium chloride 0.9% 5 mL/hr at 06/27/22 0055    sodium chloride 0.9% Stopped (07/05/22 1314)    EPINEPHrine 0.03 mcg/kg/min (07/12/22 0705)    furosemide (LASIX) 2 mg/mL continuous infusion (non-titrating) 20 mg/hr (07/12/22 0705)    nicardipine Stopped (07/05/22 2204)     Scheduled Meds:   acetaminophen  650 mg Oral Q8H    ferrous gluconate  324 mg Oral Daily with breakfast    gabapentin  100 mg Oral TID    insulin aspart U-100  8 Units Subcutaneous TIDWM    insulin detemir U-100  18 Units Subcutaneous QHS    INV aspirin/placebo  100 mg Oral Daily    LIDOcaine  1 patch Transdermal Q24H    oxymetazoline  2 spray Each Nostril BID    pantoprazole  40 mg Oral Daily    polyethylene glycol  17 g Oral Daily    potassium bicarbonate  50 mEq Oral BID    sodium chloride 0.9%  10 mL Intravenous Q6H    tiZANidine  2 mg Oral Q8H    warfarin  5 mg Oral Daily     PRN Meds:albumin human 5%, albuterol sulfate, bisacodyL, dextrose 10%, dextrose 10%, glucagon (human recombinant), glucose, glucose, hydrALAZINE, HYDROmorphone, insulin aspart U-100, magnesium hydroxide 400 mg/5 ml, magnesium sulfate IVPB, potassium bicarbonate, potassium bicarbonate, potassium bicarbonate, sodium  chloride 0.9%, sodium chloride 0.9%, Flushing PICC Protocol **AND** sodium chloride 0.9% **AND** sodium chloride 0.9%, traMADoL    Review of patient's allergies indicates:   Allergen Reactions    Aspirin Other (See Comments)     Mr. Thacker is enrolled in Dr. Paige's Alon Trial and cannot have any aspirin and/or aspirin-containing products. DO NOT cancel any orders for INV Aspirin 100 mg/Placebo. If you have questions, please contact Isabel @ [phone], [phone],[email or web address], secure chat or MS Teams message.    Bumex [bumetanide] Hives    Lactose Diarrhea     Other reaction(s): Abdominal distension, gaseous    Torsemide Hives     Objective:     Vital Signs (Most Recent):  Temp: 98.6 °F (37 °C) (07/12/22 0315)  Pulse: 102 (07/12/22 0915)  Resp: (!) 21 (07/12/22 0915)  BP: (!) 78/0 (07/12/22 0800)  SpO2: 96 % (07/11/22 2300)   Vital Signs (24h Range):  Temp:  [98.4 °F (36.9 °C)-99 °F (37.2 °C)] 98.6 °F (37 °C)  Pulse:  [] 102  Resp:  [10-52] 21  SpO2:  [94 %-100 %] 96 %  BP: (70-80)/(0) 78/0  Arterial Line BP: ()/() 78/68     Patient Vitals for the past 72 hrs (Last 3 readings):   Weight   07/11/22 0600 105.3 kg (232 lb 2.3 oz)   07/10/22 0400 105 kg (231 lb 7.7 oz)       Body mass index is 29.02 kg/m².      Intake/Output Summary (Last 24 hours) at 7/12/2022 0930  Last data filed at 7/12/2022 0705  Gross per 24 hour   Intake 840.85 ml   Output 5126 ml   Net -4285.15 ml         Hemodynamic Parameters:       Telemetry: ST    Physical Exam  Vitals and nursing note reviewed.   Constitutional:       Appearance: Normal appearance.      Comments: Sitting in chair   HENT:      Head: Normocephalic and atraumatic.   Eyes:      Extraocular Movements: Extraocular movements intact.      Conjunctiva/sclera: Conjunctivae normal.   Neck:      Comments: JVP visible at level of ear sitting upright in chair  Cardiovascular:      Rate and Rhythm: Regular rhythm. Tachycardia present.      Comments:  Smooth VAD hum  Pulmonary:      Breath sounds: Normal breath sounds.   Abdominal:      Palpations: Abdomen is soft.   Musculoskeletal:         General: Swelling present.      Cervical back: Neck supple.      Comments: Bilateral pedal edema (improved), now with compression stockings in place   Skin:     General: Skin is dry.      Capillary Refill: Capillary refill takes 2 to 3 seconds.   Neurological:      Mental Status: He is alert.       Significant Labs:  CBC:  Recent Labs   Lab 07/10/22  0343 07/11/22  0234 07/12/22  0309   WBC 14.13* 13.73* 13.64*   RBC 2.63* 2.59* 2.64*   HGB 7.0* 7.0* 7.1*   HCT 23.2* 22.3* 22.9*    437 433   MCV 88 86 87   MCH 26.6* 27.0 26.9*   MCHC 30.2* 31.4* 31.0*       BNP:  Recent Labs   Lab 07/06/22  0402 07/08/22  0408 07/11/22  0234   * 677* 711*       CMP:  Recent Labs   Lab 07/10/22  0343 07/10/22  1033 07/11/22  0234 07/11/22  1115 07/11/22  2226 07/12/22  0309 07/12/22  0810   *  --  119*  --   --  186*  --    CALCIUM 9.1  --  9.2  --   --  8.9  --    ALBUMIN 3.0*  --  2.9*  --   --  3.0*  --    PROT 6.7  --  6.5  --   --  6.8  --    *  --  135*  --   --  135*  --    K 3.8  3.7   < > 3.9   < > 4.5 3.1*  2.9* 3.3*   CO2 31*  --  31*  --   --  29  --    CL 89*  --  94*  --   --  92*  --    BUN 30*  --  23*  --   --  15  --    CREATININE 1.3  --  1.2  --   --  1.3  --    ALKPHOS 167*  --  156*  --   --  161*  --    ALT 92*  --  73*  --   --  62*  --    AST 48*  --  41*  --   --  40  --    BILITOT 1.0  --  0.8  --   --  0.9  --     < > = values in this interval not displayed.        Coagulation:   Recent Labs   Lab 07/10/22  0343 07/11/22  0234 07/12/22  0309 07/12/22  0810   INR 2.4* 2.2*  --  2.4*   APTT 34.5* 32.3* 33.1*  --        LDH:  Recent Labs   Lab 07/10/22  0343 07/11/22  0234 07/12/22  0309   * 442* 434*       Microbiology:  Microbiology Results (last 7 days)       Procedure Component Value Units Date/Time    Blood culture [[id number]]  "Collected: 07/02/22 1629    Order Status: Completed Specimen: Blood from Peripheral, Hand, Right Updated: 07/07/22 1812     Blood Culture, Routine No growth after 5 days.    Blood culture [[id number]] Collected: 07/02/22 1618    Order Status: Completed Specimen: Blood from Peripheral, Antecubital, Right Updated: 07/07/22 1812     Blood Culture, Routine No growth after 5 days.    Blood culture [[id number]] Collected: 06/30/22 1836    Order Status: Completed Specimen: Blood from Peripheral, Wrist, Left Updated: 07/05/22 2012     Blood Culture, Routine No growth after 5 days.    Blood culture [[id number]] Collected: 06/30/22 1833    Order Status: Completed Specimen: Blood from Peripheral, Forearm, Right Updated: 07/05/22 2012     Blood Culture, Routine No growth after 5 days.            I have reviewed all pertinent labs within the past 24 hours.    Estimated Creatinine Clearance: 102.1 mL/min (based on SCr of 1.3 mg/dL).    Diagnostic Results:  I have reviewed and interpreted all pertinent imaging results/findings within the past 24 hours.    Assessment and Plan:     36 yo male with NIDCM with BiV systolic heart failure, on home Milrinone at 0.375 mcg/kg/min, presented at [facility] 4/2/22 ,was not evaluated for OHT as he has recently quit smoking in April 2022 but was approved for VAD with plan to begin OHT evaluation in upcoming months if Mr Queen is stable and suitable for OHT eval (blood group A), 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 Went to ED at [facility] 6/10 but left after waiting 4 hours. Had clinic appointment with us today, but arrived to [facility] early this morning and decided to go " to the ED instead. Baseline Lasix dose is 80 mg bid. Reports taking 240 mg qd past 3 days with no improvement. BNP is 1701, up from 898 on 6/2 and 49 on 5/24. sCr is 1.8 with baseline ~ 1.5-1.7. sPO2 on RA is 93%. Wife at bedside    He has been given Lasix 80 mg IVP in the ED with plan to start Lasix gtt at 20 mg/hr           * Acute on chronic combined systolic and diastolic congestive heart failure, NYHA class 4  - [facility]  - Was not evaluated for OHTx as he quit smoking in April 2022.   - Received HM3 on 6/29  - Hypervolemic on Lasix gtt, increased to 20 mg/hr overnight  - Net -ve 4.1 L/24 hours   - SVO2 45, CO 7.4, CI 3.1 on epi 0.03. Midodrine discontinued yesterday   - Formal ECHO today after LVAD speed change 7/11/22  - See LVAD    Hyponatremia  -Hypervolemic, now improved  -Discontinued salt tablets   -Improvement in Na with diuresis    LVAD (left ventricular assist device) present  -S/P DT HM3 with MVR plus Protek Duo for RV support 6/29 (Grecia)   -RVAD removed with concern for hemolysis  -Marcella increased to 20 (now off),  off, remains on epi 0.03  -IV Lasix gtt increased to 20 mg/hr overnight with improved volume status - see ADHF   -HTS primary yesterday   -AC GASTON trial   -LDH stable  -Speed set at 5200 (increased to 5300 on 7/6 then decreased to 5200 rpm 7/11)  -ECHO done 7/5 EF: 10%, LVEDD: 7.13 with speed at 5200. Repeat ECHO today     Procedure: Device Interrogation Including analysis of device parameters  Current Settings: Ventricular Assist Device  Review of device function is stable    TXP LVAD INTERROGATIONS 7/12/2022 7/12/2022 7/12/2022 7/12/2022 7/12/2022 7/12/2022 7/12/2022   Type HeartMate3 HeartMate3 HeartMate3 HeartMate3 HeartMate3 HeartMate3 HeartMate3   Flow 4.5 4.6 4.5 4.4 4.5 4.4 4.3   Speed 5200 5200 5200 5200 5200 5200 5200   PI 3.8 3.3 3.5 3.4 3.6 3.4 3.5   Power (Serna) 3.7 3.7 3.6 3.7 3.7 3.6 3.7   LSL 4800 4800 4800 4800 4800 4800 4800   Pulsatility Intermittent pulse  Intermittent pulse Intermittent pulse Intermittent pulse Intermittent pulse Intermittent pulse Intermittent pulse       Staphylococcus epidermidis bacteremia  -Blood cultures 6/20 and repeat 6/21 positive for Staph Epi. One of two blood cultures from 6/23 positive for Staph (taken prior to line exchange). Repeat cultures sent 6/25 NGTD.   -IJ exchanged on 6/23, IABP exchanged 6/23  -ID recommended 14 day course of vancomycin from VAD implantation on 6/29 with end date: 7/12/22. Vancomycin discontinued per CTS with concerns for elevated sCr. ID with new recs to complete 7d course of daptomycin, which was completed 7/7/22.        Uncontrolled diabetes mellitus  Admitted 5/24-5/27 with hyperglycemia hyperosmolar syndrome  -Hgb A1C 9.2 on 5/20/22  -Endocrine following, on insulin gtt    CKD (chronic kidney disease) stage 3, GFR 30-59 ml/min  SCr baseline ~ 1.5-1.7      Uninterrupted Critical Care/Counseling Time (not including procedures): 60 minutes      Denise Espinoza PA-C  Heart Transplant  [facility]

## 2022-07-12 NOTE — ASSESSMENT & PLAN NOTE
-S/P DT HM3 with MVR plus Protek Duo for RV support 6/29 (Grecia)   -RVAD removed with concern for hemolysis  -Marcella increased to 20 (now off),  off, remains on epi 0.03  -IV Lasix gtt increased to 20 mg/hr overnight with improved volume status - see ADHF   -HTS primary yesterday   -AC GASTON trial   -LDH stable  -Speed set at 5200 (increased to 5300 on 7/6 then decreased to 5200 rpm 7/11)  -ECHO done 7/5 EF: 10%, LVEDD: 7.13 with speed at 5200. Repeat ECHO today     Procedure: Device Interrogation Including analysis of device parameters  Current Settings: Ventricular Assist Device  Review of device function is stable    TXP LVAD INTERROGATIONS 7/12/2022 7/12/2022 7/12/2022 7/12/2022 7/12/2022 7/12/2022 7/12/2022   Type HeartMate3 HeartMate3 HeartMate3 HeartMate3 HeartMate3 HeartMate3 HeartMate3   Flow 4.5 4.6 4.5 4.4 4.5 4.4 4.3   Speed 5200 5200 5200 5200 5200 5200 5200   PI 3.8 3.3 3.5 3.4 3.6 3.4 3.5   Power (Serna) 3.7 3.7 3.6 3.7 3.7 3.6 3.7   LSL 4800 4800 4800 4800 4800 4800 4800   Pulsatility Intermittent pulse Intermittent pulse Intermittent pulse Intermittent pulse Intermittent pulse Intermittent pulse Intermittent pulse

## 2022-07-12 NOTE — PROGRESS NOTES
07/12/2022  Casey Arizmendi    Current provider:  Josh Pulido MD    Device interrogation:  TXP LVAD INTERROGATIONS 7/12/2022 7/12/2022 7/12/2022 7/12/2022 7/12/2022 7/12/2022 7/12/2022   Type HeartMate3 HeartMate3 HeartMate3 HeartMate3 HeartMate3 HeartMate3 HeartMate3   Flow 4.5 4.6 4.5 4.4 4.5 4.4 4.3   Speed 5200 5200 5200 5200 5200 5200 5200   PI 3.8 3.3 3.5 3.4 3.6 3.4 3.5   Power (Serna) 3.7 3.7 3.6 3.7 3.7 3.6 3.7   LSL 4800 4800 4800 4800 4800 4800 4800   Pulsatility Intermittent pulse Intermittent pulse Intermittent pulse Intermittent pulse Intermittent pulse Intermittent pulse Intermittent pulse          Rounded on Kevan Queen to ensure all mechanical assist device settings (IABP or VAD) were appropriate and all parameters were within limits.  I was able to ensure all back up equipment was present, the staff had no issues, and the Perfusion Department daily rounding was complete.      For implantable VADs: Interrogation of Ventricular assist device was performed with analysis of device parameters and review of device function. I have personally reviewed the interrogation findings and agree with findings as stated.     In emergency, the nursing units have been notified to contact the perfusion department either by:  Calling [phone] from 630am to 4pm Mon thru Fri, utilizing the On-Call Finder functionality of Epic and searching for Perfusion, or by contacting the hospital  from 4pm to 630am and on weekends and asking to speak with the perfusionist on call.    6:54 AM

## 2022-07-12 NOTE — PT/OT/SLP PROGRESS
Physical Therapy Treatment    Patient Name:  Kevan Queen   MRN:  [id number]    Recommendations:     Discharge Recommendations:  rehabilitation facility   Discharge Equipment Recommendations: none   Barriers to discharge: Inaccessible home and increased level of assistance    Assessment:     Kevan Queen is a 37 y.o. male admitted with a medical diagnosis of Acute on chronic combined systolic and diastolic congestive heart failure, NYHA class 4.  He presents with the following impairments/functional limitations:  weakness, impaired endurance, impaired self care skills, impaired functional mobilty, gait instability, impaired balance, decreased lower extremity function, decreased safety awareness, pain, orthopedic precautions. Pt tolerated session well. Pt reported pain and pressure in chest with mobility. Pt ambulated 37 ft, 94 ft, and 109 ft with SBA with standing rest breaks in between. Pt would benefit from an inpatient rehab facility for: Dynamic/static standing/sitting balance through skilled balance training and strengthening with the use of skilled therapeutic exercises interventions. Pt continues to benefit from a collaborative PT/OT program to improve quality of life and focus on recovery of impairments.    Rehab Prognosis: Good; patient would benefit from acute skilled PT services to address these deficits and reach maximum level of function.    Recent Surgery: Procedure(s) (LRB):  CLOSURE, WOUND, STERNUM (N/A)  INSERTION-RIGHT VENTRICULAR ASSIST DEVICE (Right)  APPLICATION, WOUND VAC (N/A)  IRRIGATION, MEDIASTINUM 12 Days Post-Op    Plan:     During this hospitalization, patient to be seen 6 x/week to address the identified rehab impairments via gait training, therapeutic activities, therapeutic exercises, neuromuscular re-education and progress toward the following goals:    · Plan of Care Expires:  08/01/22    Subjective     Chief Complaint: chest and abdomen pressure/pain   Patient/Family  Comments/goals: Pt requesting to work on shoulder for forzen shoulder, but when PT placed priority on ambulation, pt did not want to perform 2 activities due to fatigue.  Pain/Comfort:  · Pain Rating 1: 0/10  · Location 1:  (chest and abdomen)  · Pain Addressed 1: Distraction, Reposition, Nurse notified  · Pain Rating Post-Intervention 1: other (see comments) (not rated, pain/pressure during mobility)      Objective:     Communicated with RN prior to session.  Patient found up in chair with PICC line, peripheral IV, arterial line, LVAD, blood pressure cuff, telemetry, pulse ox (continuous) upon PT entry to room.     General Precautions: Standard, fall, sternal   Orthopedic Precautions:N/A   Braces: N/A  Respiratory Status: Room air     Functional Mobility:  · Bed Mobility:  N/T, pt found up in chair  · Transfers:     · Sit to Stand:  contact guard assistance with no AD   · Stand to Sit: CGA with VCing for no UE use  · Gait: 37 ft, 94 ft, and 109 ft with SBA with standing rest breaks in between, no AD, 1 episode of minor unsteadiness noted  · Balance:   · Static Sitting: independent  · Dynamic Sitting: Supervision  · Static Standing: SBA  · Dynamic Standing: SBA      AM-PAC 6 CLICK MOBILITY  Turning over in bed (including adjusting bedclothes, sheets and blankets)?: 3  Sitting down on and standing up from a chair with arms (e.g., wheelchair, bedside commode, etc.): 3  Moving from lying on back to sitting on the side of the bed?: 3  Moving to and from a bed to a chair (including a wheelchair)?: 3  Need to walk in hospital room?: 3  Climbing 3-5 steps with a railing?: 2  Basic Mobility Total Score: 17       Therapeutic Activities and Exercises:  Patient educated on role of therapy, goals of session, and benefits of mobilizing.   Discussed PT plan of care during hospitalization.   Patient educated on calling for assistance.   Patient educated on how their diagnosis impacts their mobility within PT scope of practice.    Communication board up to date.  All questions answered within PT scope of practice.    Patient left up in chair with all lines intact, call button in reach, RN notified, signfiicant other present and back on wall power.    GOALS:   Multidisciplinary Problems     Physical Therapy Goals        Problem: Physical Therapy    Goal Priority Disciplines Outcome Goal Variances Interventions   Physical Therapy Goal     PT, PT/OT Ongoing, Progressing     Description: Goals to be met by: 2022    Patient will increase functional independence with mobility by performin. Supine to sit with MInimal Assistance -not met  2. Sit to stand transfer with Minimal Assistance - met   2a. Sit to stand transfer with supervision  3. Gait  x 50 feet with Contact Guard Assistance -not met.                  Multidisciplinary Problems (Resolved)        Problem: Physical Therapy    Goal Priority Disciplines Outcome Goal Variances Interventions   Physical Therapy Goal   (Resolved)     PT, PT/OT Met     Description:     Problem: Physical Therapy  Goal: Physical Therapy Goal  Description: Goals to be met by: 2022     Patient will increase functional independence with mobility by performin. Lower extremity exercise program without IABP 30 reps per handout, with independence  2.Upper extremity exercise program 30 reps per handout, with independence                         Time Tracking:     PT Received On: 22  PT Start Time: 922     PT Stop Time: 946  PT Total Time (min): 24 min     Billable Minutes: Gait Training 12 and Therapeutic Activity 12    Treatment Type: Treatment  PT/PTA: PT     PTA Visit Number: 0     2022

## 2022-07-12 NOTE — CARE UPDATE
BG goal 140-180    -A1C   Lab Results   Component Value Date    HGBA1C 9.2 (H) 05/20/2022         -HOME REGIMEN: Detemir  23  units daily and Aspart   12  units TIDWM and  Mod dose correction insulin    -INPATIENT REGIMEN: levemir 18 units daily and novolog 8 units with meals.     -GLUCOSE TREND FOR THE PAST 24HRS: 119-232    -NO HYPOGYCEMIAS NOTED     -TOLERATING 25-50% OF PO DIET     -Diet: Diet Cardiac Fluid - 1500mL        Remains in SICU, NAEON. Creatinine 1.3. BG reasonably well controlled with current regimen. LVAD. 12 Days Post-Op      Plan:  continue levemir 18 units daily.   continue novolog 8 units with meals.   BG monitoring ac/hs and moderate dose correction scale.     Discharge planning:tbd     Endocrine to continue to follow    ** Please call Endocrine for any BG related issues **

## 2022-07-12 NOTE — PLAN OF CARE
"      SICU PLAN OF CARE NOTE    Dx: Acute on chronic combined systolic and diastolic congestive heart failure, NYHA class 4     Shift Events: No acute events over night. No VAD alarms. Replaced lytes as needed. Plan of care review with pt and family.     Goals of Care: MAP 60-90    Neuro: AAO x4, Follows Commands, and Moves All Extremities    Vital Signs: BP (!) 75/0 (BP Location: Left arm, Patient Position: Lying)   Pulse 101   Temp 98.6 °F (37 °C) (Oral)   Resp 12   Ht 6' 3" (1.905 m)   Wt 105.3 kg (232 lb 2.3 oz)   SpO2 96%   BMI 29.02 kg/m²     Respiratory: Room Air    Diet: Cardiac Diet 1500 ml FR    Gtts: Epinephrine and Lasix    Urine Output: Voids Spontaneously 2850 cc/shift    VAD: Speed 5200, Flow; 4.3-4.6, PI: 3.2-3.8, Power: 3.6-3.7     Labs/Accuchecks: Daily labs, Mg and K q 6 hr, Accuchecks Ac/Hs.    Skin: No new skin breakdown noted during shift. Pt weight shifting, specialty bed in use. Pt up to bedside commode during shift.       "

## 2022-07-12 NOTE — SUBJECTIVE & OBJECTIVE
Interval History: HTS primary yesterday after LVAD speed decreased to 5200 rpm. ECHO today. IV Lasix gtt increased to 20 mg/hr overnight. Net -ve 4.1 L/24 hours. Replace electrolytes aggressively. Remains on epi 0.03 with MAPs in mid 70s-80s. SVO2 45, CO 7.4, CI 3.1. No complaints.         Continuous Infusions:   sodium chloride 0.9% 5 mL/hr at 06/27/22 0055    sodium chloride 0.9% Stopped (07/05/22 1314)    EPINEPHrine 0.03 mcg/kg/min (07/12/22 0705)    furosemide (LASIX) 2 mg/mL continuous infusion (non-titrating) 20 mg/hr (07/12/22 0705)    nicardipine Stopped (07/05/22 2204)     Scheduled Meds:   acetaminophen  650 mg Oral Q8H    ferrous gluconate  324 mg Oral Daily with breakfast    gabapentin  100 mg Oral TID    insulin aspart U-100  8 Units Subcutaneous TIDWM    insulin detemir U-100  18 Units Subcutaneous QHS    INV aspirin/placebo  100 mg Oral Daily    LIDOcaine  1 patch Transdermal Q24H    oxymetazoline  2 spray Each Nostril BID    pantoprazole  40 mg Oral Daily    polyethylene glycol  17 g Oral Daily    potassium bicarbonate  50 mEq Oral BID    sodium chloride 0.9%  10 mL Intravenous Q6H    tiZANidine  2 mg Oral Q8H    warfarin  5 mg Oral Daily     PRN Meds:albumin human 5%, albuterol sulfate, bisacodyL, dextrose 10%, dextrose 10%, glucagon (human recombinant), glucose, glucose, hydrALAZINE, HYDROmorphone, insulin aspart U-100, magnesium hydroxide 400 mg/5 ml, magnesium sulfate IVPB, potassium bicarbonate, potassium bicarbonate, potassium bicarbonate, sodium chloride 0.9%, sodium chloride 0.9%, Flushing PICC Protocol **AND** sodium chloride 0.9% **AND** sodium chloride 0.9%, traMADoL    Review of patient's allergies indicates:   Allergen Reactions    Aspirin Other (See Comments)     Mr. Thacker is enrolled in Dr. Paige's Alon Trial and cannot have any aspirin and/or aspirin-containing products. DO NOT cancel any orders for INV Aspirin 100 mg/Placebo. If you have questions, please contact Isabel @ [phone],  [phone],[email or web address], secure chat or MS Teams message.    Bumex [bumetanide] Hives    Lactose Diarrhea     Other reaction(s): Abdominal distension, gaseous    Torsemide Hives     Objective:     Vital Signs (Most Recent):  Temp: 98.6 °F (37 °C) (07/12/22 0315)  Pulse: 102 (07/12/22 0915)  Resp: (!) 21 (07/12/22 0915)  BP: (!) 78/0 (07/12/22 0800)  SpO2: 96 % (07/11/22 2300)   Vital Signs (24h Range):  Temp:  [98.4 °F (36.9 °C)-99 °F (37.2 °C)] 98.6 °F (37 °C)  Pulse:  [] 102  Resp:  [10-52] 21  SpO2:  [94 %-100 %] 96 %  BP: (70-80)/(0) 78/0  Arterial Line BP: ()/() 78/68     Patient Vitals for the past 72 hrs (Last 3 readings):   Weight   07/11/22 0600 105.3 kg (232 lb 2.3 oz)   07/10/22 0400 105 kg (231 lb 7.7 oz)       Body mass index is 29.02 kg/m².      Intake/Output Summary (Last 24 hours) at 7/12/2022 0930  Last data filed at 7/12/2022 0705  Gross per 24 hour   Intake 840.85 ml   Output 5126 ml   Net -4285.15 ml         Hemodynamic Parameters:       Telemetry: ST    Physical Exam  Vitals and nursing note reviewed.   Constitutional:       Appearance: Normal appearance.      Comments: Sitting in chair   HENT:      Head: Normocephalic and atraumatic.   Eyes:      Extraocular Movements: Extraocular movements intact.      Conjunctiva/sclera: Conjunctivae normal.   Neck:      Comments: JVP visible at level of ear sitting upright in chair  Cardiovascular:      Rate and Rhythm: Regular rhythm. Tachycardia present.      Comments: Smooth VAD hum  Pulmonary:      Breath sounds: Normal breath sounds.   Abdominal:      Palpations: Abdomen is soft.   Musculoskeletal:         General: Swelling present.      Cervical back: Neck supple.      Comments: Bilateral pedal edema (improved), now with compression stockings in place   Skin:     General: Skin is dry.      Capillary Refill: Capillary refill takes 2 to 3 seconds.   Neurological:      Mental Status: He is alert.       Significant  Labs:  CBC:  Recent Labs   Lab 07/10/22  0343 07/11/22  0234 07/12/22  0309   WBC 14.13* 13.73* 13.64*   RBC 2.63* 2.59* 2.64*   HGB 7.0* 7.0* 7.1*   HCT 23.2* 22.3* 22.9*    437 433   MCV 88 86 87   MCH 26.6* 27.0 26.9*   MCHC 30.2* 31.4* 31.0*       BNP:  Recent Labs   Lab 07/06/22  0402 07/08/22  0408 07/11/22  0234   * 677* 711*       CMP:  Recent Labs   Lab 07/10/22  0343 07/10/22  1033 07/11/22 0234 07/11/22  1115 07/11/22 2226 07/12/22 0309 07/12/22  0810   *  --  119*  --   --  186*  --    CALCIUM 9.1  --  9.2  --   --  8.9  --    ALBUMIN 3.0*  --  2.9*  --   --  3.0*  --    PROT 6.7  --  6.5  --   --  6.8  --    *  --  135*  --   --  135*  --    K 3.8  3.7   < > 3.9   < > 4.5 3.1*  2.9* 3.3*   CO2 31*  --  31*  --   --  29  --    CL 89*  --  94*  --   --  92*  --    BUN 30*  --  23*  --   --  15  --    CREATININE 1.3  --  1.2  --   --  1.3  --    ALKPHOS 167*  --  156*  --   --  161*  --    ALT 92*  --  73*  --   --  62*  --    AST 48*  --  41*  --   --  40  --    BILITOT 1.0  --  0.8  --   --  0.9  --     < > = values in this interval not displayed.        Coagulation:   Recent Labs   Lab 07/10/22  0343 07/11/22  0234 07/12/22  0309 07/12/22  0810   INR 2.4* 2.2*  --  2.4*   APTT 34.5* 32.3* 33.1*  --        LDH:  Recent Labs   Lab 07/10/22  0343 07/11/22  0234 07/12/22  0309   * 442* 434*       Microbiology:  Microbiology Results (last 7 days)       Procedure Component Value Units Date/Time    Blood culture [[id number]] Collected: 07/02/22 1629    Order Status: Completed Specimen: Blood from Peripheral, Hand, Right Updated: 07/07/22 1812     Blood Culture, Routine No growth after 5 days.    Blood culture [[id number]] Collected: 07/02/22 1618    Order Status: Completed Specimen: Blood from Peripheral, Antecubital, Right Updated: 07/07/22 1812     Blood Culture, Routine No growth after 5 days.    Blood culture [[id number]] Collected: 06/30/22 1836    Order Status:  Completed Specimen: Blood from Peripheral, Wrist, Left Updated: 07/05/22 2012     Blood Culture, Routine No growth after 5 days.    Blood culture [[id number]] Collected: 06/30/22 1833    Order Status: Completed Specimen: Blood from Peripheral, Forearm, Right Updated: 07/05/22 2012     Blood Culture, Routine No growth after 5 days.            I have reviewed all pertinent labs within the past 24 hours.    Estimated Creatinine Clearance: 102.1 mL/min (based on SCr of 1.3 mg/dL).    Diagnostic Results:  I have reviewed and interpreted all pertinent imaging results/findings within the past 24 hours.

## 2022-07-12 NOTE — PT/OT/SLP PROGRESS
Occupational Therapy      Patient Name:  Kevan Queen   MRN:  [id number]    Patient not seen today secondary to on hold in late AM per nursing as pt had just finished with PT and got back to bed awaiting ECHO. Upon OT return at 1415, pt working with LVAD  and still awaiting ECHO  . Will follow-up 7/13/22.    7/12/2022

## 2022-07-13 PROBLEM — R20.2 TINGLING IN EXTREMITIES: Status: ACTIVE | Noted: 2022-07-13

## 2022-07-13 LAB
ALBUMIN SERPL BCP-MCNC: 2.8 G/DL (ref 3.5–5.2)
ALLENS TEST: ABNORMAL
ALLENS TEST: ABNORMAL
ALP SERPL-CCNC: 165 U/L (ref 55–135)
ALT SERPL W/O P-5'-P-CCNC: 51 U/L (ref 10–44)
ANION GAP SERPL CALC-SCNC: 10 MMOL/L (ref 8–16)
ANION GAP SERPL CALC-SCNC: 12 MMOL/L (ref 8–16)
APTT BLDCRRT: 32.9 SEC (ref 21–32)
AST SERPL-CCNC: 37 U/L (ref 10–40)
BASOPHILS # BLD AUTO: 0.05 K/UL (ref 0–0.2)
BASOPHILS NFR BLD: 0.4 % (ref 0–1.9)
BILIRUB SERPL-MCNC: 0.9 MG/DL (ref 0.1–1)
BNP SERPL-MCNC: 624 PG/ML (ref 0–99)
BUN SERPL-MCNC: 11 MG/DL (ref 6–20)
BUN SERPL-MCNC: 11 MG/DL (ref 6–20)
CALCIUM SERPL-MCNC: 9 MG/DL (ref 8.7–10.5)
CALCIUM SERPL-MCNC: 9.1 MG/DL (ref 8.7–10.5)
CHLORIDE SERPL-SCNC: 89 MMOL/L (ref 95–110)
CHLORIDE SERPL-SCNC: 94 MMOL/L (ref 95–110)
CO2 SERPL-SCNC: 29 MMOL/L (ref 23–29)
CO2 SERPL-SCNC: 31 MMOL/L (ref 23–29)
CREAT SERPL-MCNC: 1.1 MG/DL (ref 0.5–1.4)
CREAT SERPL-MCNC: 1.1 MG/DL (ref 0.5–1.4)
CRP SERPL-MCNC: 45.2 MG/L (ref 0–8.2)
DELSYS: ABNORMAL
DELSYS: ABNORMAL
DIFFERENTIAL METHOD: ABNORMAL
EOSINOPHIL # BLD AUTO: 0.1 K/UL (ref 0–0.5)
EOSINOPHIL NFR BLD: 0.9 % (ref 0–8)
ERYTHROCYTE [DISTWIDTH] IN BLOOD BY AUTOMATED COUNT: 17.3 % (ref 11.5–14.5)
EST. GFR  (AFRICAN AMERICAN): >60 ML/MIN/1.73 M^2
EST. GFR  (AFRICAN AMERICAN): >60 ML/MIN/1.73 M^2
EST. GFR  (NON AFRICAN AMERICAN): >60 ML/MIN/1.73 M^2
EST. GFR  (NON AFRICAN AMERICAN): >60 ML/MIN/1.73 M^2
FIBRINOGEN PPP-MCNC: 540 MG/DL (ref 182–400)
FINAL PATHOLOGIC DIAGNOSIS: NORMAL
GLUCOSE SERPL-MCNC: 163 MG/DL (ref 70–110)
GLUCOSE SERPL-MCNC: 194 MG/DL (ref 70–110)
GROSS: NORMAL
HCO3 UR-SCNC: 33.7 MMOL/L (ref 24–28)
HCO3 UR-SCNC: 34.7 MMOL/L (ref 24–28)
HCT VFR BLD AUTO: 22.8 % (ref 40–54)
HGB BLD-MCNC: 7.1 G/DL (ref 14–18)
IMM GRANULOCYTES # BLD AUTO: 0.05 K/UL (ref 0–0.04)
IMM GRANULOCYTES NFR BLD AUTO: 0.4 % (ref 0–0.5)
INR PPP: 2.5 (ref 0.8–1.2)
LDH SERPL L TO P-CCNC: 415 U/L (ref 110–260)
LYMPHOCYTES # BLD AUTO: 1.8 K/UL (ref 1–4.8)
LYMPHOCYTES NFR BLD: 14.7 % (ref 18–48)
Lab: NORMAL
MAGNESIUM SERPL-MCNC: 1.8 MG/DL (ref 1.6–2.6)
MAGNESIUM SERPL-MCNC: 2 MG/DL (ref 1.6–2.6)
MAGNESIUM SERPL-MCNC: 2.1 MG/DL (ref 1.6–2.6)
MAGNESIUM SERPL-MCNC: 2.5 MG/DL (ref 1.6–2.6)
MCH RBC QN AUTO: 26.2 PG (ref 27–31)
MCHC RBC AUTO-ENTMCNC: 31.1 G/DL (ref 32–36)
MCV RBC AUTO: 84 FL (ref 82–98)
MONOCYTES # BLD AUTO: 1.1 K/UL (ref 0.3–1)
MONOCYTES NFR BLD: 9.3 % (ref 4–15)
NEUTROPHILS # BLD AUTO: 9 K/UL (ref 1.8–7.7)
NEUTROPHILS NFR BLD: 74.3 % (ref 38–73)
NRBC BLD-RTO: 0 /100 WBC
PCO2 BLDA: 48 MMHG (ref 35–45)
PCO2 BLDA: 50.3 MMHG (ref 35–45)
PH SMN: 7.45 [PH] (ref 7.35–7.45)
PH SMN: 7.45 [PH] (ref 7.35–7.45)
PLATELET # BLD AUTO: 445 K/UL (ref 150–450)
PMV BLD AUTO: 9.1 FL (ref 9.2–12.9)
PO2 BLDA: 23 MMHG (ref 40–60)
PO2 BLDA: 27 MMHG (ref 40–60)
POC BE: 10 MMOL/L
POC BE: 11 MMOL/L
POC SATURATED O2: 41 % (ref 95–100)
POC SATURATED O2: 52 % (ref 95–100)
POC SVO2: 52 %
POC TCO2: 35 MMOL/L (ref 24–29)
POC TCO2: 36 MMOL/L (ref 24–29)
POCT GLUCOSE: 118 MG/DL (ref 70–110)
POCT GLUCOSE: 131 MG/DL (ref 70–110)
POCT GLUCOSE: 175 MG/DL (ref 70–110)
POCT GLUCOSE: 181 MG/DL (ref 70–110)
POCT GLUCOSE: 278 MG/DL (ref 70–110)
POTASSIUM SERPL-SCNC: 3.2 MMOL/L (ref 3.5–5.1)
POTASSIUM SERPL-SCNC: 3.6 MMOL/L (ref 3.5–5.1)
POTASSIUM SERPL-SCNC: 4 MMOL/L (ref 3.5–5.1)
POTASSIUM SERPL-SCNC: 4 MMOL/L (ref 3.5–5.1)
PROT SERPL-MCNC: 7.5 G/DL (ref 6–8.4)
PROTHROMBIN TIME: 24.7 SEC (ref 9–12.5)
RBC # BLD AUTO: 2.71 M/UL (ref 4.6–6.2)
SAMPLE: ABNORMAL
SAMPLE: ABNORMAL
SITE: ABNORMAL
SITE: ABNORMAL
SODIUM SERPL-SCNC: 132 MMOL/L (ref 136–145)
SODIUM SERPL-SCNC: 133 MMOL/L (ref 136–145)
WBC # BLD AUTO: 12.08 K/UL (ref 3.9–12.7)

## 2022-07-13 PROCEDURE — 97530 THERAPEUTIC ACTIVITIES: CPT

## 2022-07-13 PROCEDURE — 84132 ASSAY OF SERUM POTASSIUM: CPT | Mod: 91

## 2022-07-13 PROCEDURE — 99233 SBSQ HOSP IP/OBS HIGH 50: CPT | Mod: ,,, | Performed by: INTERNAL MEDICINE

## 2022-07-13 PROCEDURE — 25000003 PHARM REV CODE 250: Performed by: INTERNAL MEDICINE

## 2022-07-13 PROCEDURE — 25000003 PHARM REV CODE 250: Performed by: STUDENT IN AN ORGANIZED HEALTH CARE EDUCATION/TRAINING PROGRAM

## 2022-07-13 PROCEDURE — 27000248 HC VAD-ADDITIONAL DAY

## 2022-07-13 PROCEDURE — 25000003 PHARM REV CODE 250: Performed by: ANESTHESIOLOGY

## 2022-07-13 PROCEDURE — 20000000 HC ICU ROOM

## 2022-07-13 PROCEDURE — 25000003 PHARM REV CODE 250: Performed by: PHYSICIAN ASSISTANT

## 2022-07-13 PROCEDURE — 63600175 PHARM REV CODE 636 W HCPCS: Performed by: STUDENT IN AN ORGANIZED HEALTH CARE EDUCATION/TRAINING PROGRAM

## 2022-07-13 PROCEDURE — 80048 BASIC METABOLIC PNL TOTAL CA: CPT | Mod: XB | Performed by: STUDENT IN AN ORGANIZED HEALTH CARE EDUCATION/TRAINING PROGRAM

## 2022-07-13 PROCEDURE — 85025 COMPLETE CBC W/AUTO DIFF WBC: CPT | Performed by: THORACIC SURGERY (CARDIOTHORACIC VASCULAR SURGERY)

## 2022-07-13 PROCEDURE — 93750 INTERROGATION VAD IN PERSON: CPT | Mod: ,,, | Performed by: INTERNAL MEDICINE

## 2022-07-13 PROCEDURE — 83735 ASSAY OF MAGNESIUM: CPT | Mod: 91

## 2022-07-13 PROCEDURE — 83880 ASSAY OF NATRIURETIC PEPTIDE: CPT | Performed by: STUDENT IN AN ORGANIZED HEALTH CARE EDUCATION/TRAINING PROGRAM

## 2022-07-13 PROCEDURE — 85730 THROMBOPLASTIN TIME PARTIAL: CPT | Performed by: STUDENT IN AN ORGANIZED HEALTH CARE EDUCATION/TRAINING PROGRAM

## 2022-07-13 PROCEDURE — 25000003 PHARM REV CODE 250

## 2022-07-13 PROCEDURE — 99233 PR SUBSEQUENT HOSPITAL CARE,LEVL III: ICD-10-PCS | Mod: ,,, | Performed by: INTERNAL MEDICINE

## 2022-07-13 PROCEDURE — 99291 PR CRITICAL CARE, E/M 30-74 MINUTES: ICD-10-PCS | Mod: FS,,, | Performed by: INTERNAL MEDICINE

## 2022-07-13 PROCEDURE — 83615 LACTATE (LD) (LDH) ENZYME: CPT | Performed by: THORACIC SURGERY (CARDIOTHORACIC VASCULAR SURGERY)

## 2022-07-13 PROCEDURE — A4216 STERILE WATER/SALINE, 10 ML: HCPCS | Performed by: THORACIC SURGERY (CARDIOTHORACIC VASCULAR SURGERY)

## 2022-07-13 PROCEDURE — 85610 PROTHROMBIN TIME: CPT | Performed by: PHYSICIAN ASSISTANT

## 2022-07-13 PROCEDURE — 82803 BLOOD GASES ANY COMBINATION: CPT

## 2022-07-13 PROCEDURE — 80053 COMPREHEN METABOLIC PANEL: CPT

## 2022-07-13 PROCEDURE — 25000003 PHARM REV CODE 250: Performed by: THORACIC SURGERY (CARDIOTHORACIC VASCULAR SURGERY)

## 2022-07-13 PROCEDURE — 86140 C-REACTIVE PROTEIN: CPT | Performed by: STUDENT IN AN ORGANIZED HEALTH CARE EDUCATION/TRAINING PROGRAM

## 2022-07-13 PROCEDURE — 94761 N-INVAS EAR/PLS OXIMETRY MLT: CPT

## 2022-07-13 PROCEDURE — 97116 GAIT TRAINING THERAPY: CPT

## 2022-07-13 PROCEDURE — 94799 UNLISTED PULMONARY SVC/PX: CPT

## 2022-07-13 PROCEDURE — 85384 FIBRINOGEN ACTIVITY: CPT | Performed by: STUDENT IN AN ORGANIZED HEALTH CARE EDUCATION/TRAINING PROGRAM

## 2022-07-13 PROCEDURE — 99291 CRITICAL CARE FIRST HOUR: CPT | Mod: FS,,, | Performed by: INTERNAL MEDICINE

## 2022-07-13 PROCEDURE — 99900035 HC TECH TIME PER 15 MIN (STAT)

## 2022-07-13 PROCEDURE — 93750 PR INTERROGATE VENT ASSIST DEV, IN PERSON, W PHYSICIAN ANALYSIS: ICD-10-PCS | Mod: ,,, | Performed by: INTERNAL MEDICINE

## 2022-07-13 RX ORDER — INSULIN ASPART 100 [IU]/ML
10 INJECTION, SOLUTION INTRAVENOUS; SUBCUTANEOUS
Status: DISCONTINUED | OUTPATIENT
Start: 2022-07-13 | End: 2022-07-28 | Stop reason: HOSPADM

## 2022-07-13 RX ORDER — TRAZODONE HYDROCHLORIDE 50 MG/1
50 TABLET ORAL NIGHTLY
Status: DISCONTINUED | OUTPATIENT
Start: 2022-07-13 | End: 2022-07-21

## 2022-07-13 RX ADMIN — FUROSEMIDE 40 MG/HR: 10 INJECTION, SOLUTION INTRAMUSCULAR; INTRAVENOUS at 12:07

## 2022-07-13 RX ADMIN — FUROSEMIDE 40 MG/HR: 10 INJECTION, SOLUTION INTRAMUSCULAR; INTRAVENOUS at 10:07

## 2022-07-13 RX ADMIN — GABAPENTIN 100 MG: 100 CAPSULE ORAL at 08:07

## 2022-07-13 RX ADMIN — INSULIN ASPART 2 UNITS: 100 INJECTION, SOLUTION INTRAVENOUS; SUBCUTANEOUS at 08:07

## 2022-07-13 RX ADMIN — INSULIN ASPART 2 UNITS: 100 INJECTION, SOLUTION INTRAVENOUS; SUBCUTANEOUS at 04:07

## 2022-07-13 RX ADMIN — FUROSEMIDE 40 MG/HR: 10 INJECTION, SOLUTION INTRAMUSCULAR; INTRAVENOUS at 05:07

## 2022-07-13 RX ADMIN — Medication 10 ML: at 12:07

## 2022-07-13 RX ADMIN — WARFARIN SODIUM 4 MG: 4 TABLET ORAL at 04:07

## 2022-07-13 RX ADMIN — POTASSIUM BICARBONATE 50 MEQ: 978 TABLET, EFFERVESCENT ORAL at 02:07

## 2022-07-13 RX ADMIN — FUROSEMIDE 80 MG: 10 INJECTION, SOLUTION INTRAMUSCULAR; INTRAVENOUS at 01:07

## 2022-07-13 RX ADMIN — OXYCODONE HYDROCHLORIDE 10 MG: 10 TABLET ORAL at 08:07

## 2022-07-13 RX ADMIN — LIDOCAINE 1 PATCH: 50 PATCH CUTANEOUS at 11:07

## 2022-07-13 RX ADMIN — TIZANIDINE 2 MG: 2 TABLET ORAL at 05:07

## 2022-07-13 RX ADMIN — GABAPENTIN 100 MG: 100 CAPSULE ORAL at 02:07

## 2022-07-13 RX ADMIN — Medication 10 ML: at 06:07

## 2022-07-13 RX ADMIN — TIZANIDINE 2 MG: 2 TABLET ORAL at 09:07

## 2022-07-13 RX ADMIN — INSULIN ASPART 10 UNITS: 100 INJECTION, SOLUTION INTRAVENOUS; SUBCUTANEOUS at 04:07

## 2022-07-13 RX ADMIN — TRAZODONE HYDROCHLORIDE 50 MG: 50 TABLET ORAL at 08:07

## 2022-07-13 RX ADMIN — ACETAMINOPHEN 650 MG: 325 TABLET ORAL at 05:07

## 2022-07-13 RX ADMIN — POTASSIUM BICARBONATE 35 MEQ: 391 TABLET, EFFERVESCENT ORAL at 05:07

## 2022-07-13 RX ADMIN — Medication 324 MG: at 08:07

## 2022-07-13 RX ADMIN — FAMOTIDINE 40 MG: 20 TABLET ORAL at 08:07

## 2022-07-13 RX ADMIN — POTASSIUM BICARBONATE 50 MEQ: 978 TABLET, EFFERVESCENT ORAL at 08:07

## 2022-07-13 RX ADMIN — POLYETHYLENE GLYCOL 3350 17 G: 17 POWDER, FOR SOLUTION ORAL at 08:07

## 2022-07-13 RX ADMIN — TIZANIDINE 2 MG: 2 TABLET ORAL at 01:07

## 2022-07-13 RX ADMIN — INSULIN ASPART 10 UNITS: 100 INJECTION, SOLUTION INTRAVENOUS; SUBCUTANEOUS at 01:07

## 2022-07-13 RX ADMIN — POTASSIUM BICARBONATE 35 MEQ: 391 TABLET, EFFERVESCENT ORAL at 08:07

## 2022-07-13 RX ADMIN — OXYCODONE HYDROCHLORIDE 10 MG: 10 TABLET ORAL at 12:07

## 2022-07-13 RX ADMIN — POTASSIUM BICARBONATE 35 MEQ: 391 TABLET, EFFERVESCENT ORAL at 01:07

## 2022-07-13 RX ADMIN — MAGNESIUM SULFATE HEPTAHYDRATE 2 G: 40 INJECTION, SOLUTION INTRAVENOUS at 02:07

## 2022-07-13 RX ADMIN — POTASSIUM BICARBONATE 60 MEQ: 391 TABLET, EFFERVESCENT ORAL at 10:07

## 2022-07-13 RX ADMIN — FUROSEMIDE 40 MG/HR: 10 INJECTION, SOLUTION INTRAMUSCULAR; INTRAVENOUS at 04:07

## 2022-07-13 RX ADMIN — ACETAMINOPHEN 650 MG: 325 TABLET ORAL at 09:07

## 2022-07-13 RX ADMIN — ACETAMINOPHEN 650 MG: 325 TABLET ORAL at 01:07

## 2022-07-13 RX ADMIN — INSULIN ASPART 8 UNITS: 100 INJECTION, SOLUTION INTRAVENOUS; SUBCUTANEOUS at 08:07

## 2022-07-13 RX ADMIN — Medication 10 ML: at 07:07

## 2022-07-13 NOTE — PROGRESS NOTES
Update    Pt presents in room aaox4 with neutral affect. Pt seated in chair. Family member asleep on couch. Pt voices understanding of plan of care. Pt reports coping well and denies further needs, questions, concerns at this time and none indicated. Providing ongoing psychosocial and counseling support, education, resources, assistance and discharge planning as indicated. Following and available.

## 2022-07-13 NOTE — PT/OT/SLP PROGRESS
Occupational Therapy   Treatment & Updated D/C recommendation    Name: Kevan Queen  MRN: [id number]  Admitting Diagnosis:  Acute on chronic combined systolic and diastolic congestive heart failure, NYHA class 4  13 Days Post-Op    Recommendations:     Discharge Recommendations: home health OT  Discharge Equipment Recommendations:   (TBD)  Barriers to discharge:       Assessment:     Kevan Queen is a 37 y.o. male with a medical diagnosis of Acute on chronic combined systolic and diastolic congestive heart failure, NYHA class 4.  Pt presents with good tolerance for therapy on this date and was more motivated to participate in therapy when educated on plan for OT to transfer LVAD from wall-power to battery and PT to walk pt in hallway. Pt tolerated a variety of tasks and positions with VSS and without s/s. Pt reported concern regarding numbness/tingling in LLE & L fingers; nsg notified; pt reported waiting to meet with neuro MD regarding s/s. Based on skilled observation, pt progressing toward goals; anticipate pt will progress well with continued OT services to address functional endurance and strength and will be ready for d/c home with [facility] support when medically stable with addition of HH services.    Performance deficits affecting function are weakness, impaired endurance, impaired self care skills, impaired sensation, impaired functional mobilty, gait instability, decreased upper extremity function, decreased lower extremity function, decreased coordination, decreased safety awareness.     Rehab Prognosis:  Good; patient would benefit from acute skilled OT services to address these deficits and reach maximum level of function.       LVAD connected to wall-power prior to session, pt able to transfer from wallpower to battery power with Min A for verbal cues. Pt remained on battery power at end of session for planned gait training with PT following OT session.    Plan:     Patient to be seen 6 x/week to  "address the above listed problems via self-care/home management, therapeutic activities, therapeutic exercises  · Plan of Care Expires: 08/02/22  · Plan of Care Reviewed with: patient    Subjective   "I am ready to do whatever you want, but I will not brush my teeth, I did that already this morning"  Pain/Comfort:  · Pain Rating 1: 0/10    Objective:     Communicated with: Nskareem prior to session.  Patient found supine with arterial line, blood pressure cuff, LVAD, PICC line, pulse ox (continuous), telemetry upon OT entry to room.    General Precautions: Standard, fall, LVAD, sternal   Orthopedic Precautions:N/A   Braces:    Respiratory Status: Room air     Occupational Performance:     Bed Mobility:    · Patient completed Supine to Sit with SBA but not in compliance with sternal precautions; Pt educated on 3/3 sternal precautions and importance of compliance with precautions.    Functional Mobility/Transfers:  · Patient completed Sit <> Stand Transfer with contact guard assistance  with  no assistive device   · Patient completed Bed <> Chair Transfer using Step Transfer technique with stand by assistance with no assistive device  · Functional Mobility: Pt completed LVAD education and transfer from wallpower to battery power, and seated donning socks while seated EOB with supervision. Pt completed sit>stand from EOB with CGA and cues to stand prior to functional mobility. Pt completed steps to chair with SBA without LOB.    Activities of Daily Living:  · Lower Body Dressing: supervision for seated EOB donning socks. Pt completed with appropriate sequence and educated on additional techniques (figure 4) to comply with sternal precautions, pt refused and preferred performing by holding leg and donning socks with opposite UE.   · Pt refused additional ADLs stating completed prior to session.      [facility] 6 Click ADL: 19    Treatment & Education:  Pt educated on OT POC, purpose of OT services and safety with ADLs and " functional mobility.   Pt educated on:  - Importance and benefit of OOB activity  - Modified self-care techniques (figure-4) for dressing  - Sternal precautions  - Pt advocacy for care  - importance and benefit of performing ADLs in simulated therapeutic sessions to ensure independence with ADLs while managing LVAD.  - Energy Conservation techniques (pursed-lip breathing, taking rest breaks prn)  -LVAD education   Pt able to verbalize all parts of LVAD including controller, drive line, battery cables, battery, and clip. Pt required additional education on emergency back components but able to explain purpose of bag without additional assistance. Pt able to transfer from wall-power to battery power with minimal cuing.     Patient left up in chair with all lines intact, call button in reach, chair alarm off and nsg notifiedEducation:      GOALS:   Multidisciplinary Problems     Occupational Therapy Goals        Problem: Occupational Therapy    Goal Priority Disciplines Outcome Interventions   Occupational Therapy Goal     OT, PT/OT Ongoing, Progressing    Description: Goals to be met by: 8/2/22     Patient will increase functional independence with ADLs by performing:    UE Dressing with Modified Webster.  LE Dressing with Modified Webster and Assistive Devices as needed.  Grooming while standing at sink with Modified [place].  Toileting from toilet with Modified Webster for hygiene and clothing management.   Bathing from  shower chair/bench with Modified Webster.  Toilet transfer to toilet with Modified Webster.  Increased functional strength to WFL for B UE.  Upper extremity exercise program x15 reps per handout, with assistance as needed.  Pt will be I in all LVAD management.                     Time Tracking:     OT Date of Treatment: 07/13/22  OT Start Time: 0951  OT Stop Time: 1013  OT Total Time (min): 22 min    Billable Minutes:Therapeutic Activity 22 7/13/2022

## 2022-07-13 NOTE — PROGRESS NOTES
[facility] - [facility] Intensive Care  Heart Transplant  Progress Note    Patient Name: Kevan Queen  MRN: [id number]  Admission Date: 6/13/2022  Hospital Length of Stay: 30 days  Attending Physician: Josh Pulido MD  Primary Care Provider: ORALIA Cline  Principal Problem:Acute on chronic combined systolic and diastolic congestive heart failure, NYHA class 4    Subjective:     Interval History: IV Lasix gtt increased to 40 mg/hr overnight. Net -ve 3.2 L/24 hours and CVP 16. Remains on epi 0.03. SVO2 41%, CO 6.7, CI 2.9, . Pt reports tingling sensation in right fingers and toes, normal sensation and strength. No other focal neurologic deficits. General Neurology consulted, would appreciate their recommendations.       Continuous Infusions:   sodium chloride 0.9% 5 mL/hr at 06/27/22 0055    sodium chloride 0.9% Stopped (07/05/22 1314)    EPINEPHrine 0.03 mcg/kg/min (07/13/22 0900)    furosemide (LASIX) 2 mg/mL continuous infusion (non-titrating) 40 mg/hr (07/13/22 0900)     Scheduled Meds:   acetaminophen  650 mg Oral Q8H    famotidine  40 mg Oral Daily    ferrous gluconate  324 mg Oral Daily with breakfast    gabapentin  100 mg Oral TID    insulin aspart U-100  8 Units Subcutaneous TIDWM    insulin detemir U-100  18 Units Subcutaneous QHS    INV aspirin/placebo  100 mg Oral Daily    LIDOcaine  1 patch Transdermal Q24H    oxymetazoline  2 spray Each Nostril BID    polyethylene glycol  17 g Oral Daily    potassium bicarbonate  50 mEq Oral TID    sodium chloride 0.9%  10 mL Intravenous Q6H    tiZANidine  2 mg Oral Q8H    traZODone  50 mg Oral QHS    warfarin  4 mg Oral Daily     PRN Meds:albuterol sulfate, bisacodyL, dextrose 10%, dextrose 10%, glucagon (human recombinant), glucose, glucose, insulin aspart U-100, magnesium sulfate IVPB, oxyCODONE, potassium bicarbonate, potassium bicarbonate, potassium bicarbonate, sodium chloride 0.9%, sodium chloride 0.9%, Flushing PICC Protocol  **AND** sodium chloride 0.9% **AND** sodium chloride 0.9%, traMADoL    Review of patient's allergies indicates:   Allergen Reactions    Aspirin Other (See Comments)     Mr. Thacker is enrolled in Dr. Paige's Alon Trial and cannot have any aspirin and/or aspirin-containing products. DO NOT cancel any orders for INV Aspirin 100 mg/Placebo. If you have questions, please contact Isabel @ [phone], [phone],[email or web address], secure chat or MS Teams message.    Bumex [bumetanide] Hives    Lactose Diarrhea     Other reaction(s): Abdominal distension, gaseous    Torsemide Hives     Objective:     Vital Signs (Most Recent):  Temp: 98.8 °F (37.1 °C) (07/13/22 0715)  Pulse: 106 (07/13/22 0900)  Resp: 13 (07/13/22 0900)  BP: (!) 86/0 (07/13/22 0800)  SpO2: 98 % (07/13/22 0800)   Vital Signs (24h Range):  Temp:  [98.2 °F (36.8 °C)-98.8 °F (37.1 °C)] 98.8 °F (37.1 °C)  Pulse:  [] 106  Resp:  [9-36] 13  SpO2:  [96 %-98 %] 98 %  BP: (80-90)/(0) 86/0  Arterial Line BP: ()/(54-98) 88/79     Patient Vitals for the past 72 hrs (Last 3 readings):   Weight   07/12/22 1603 105 kg (231 lb 7.7 oz)   07/11/22 0600 105.3 kg (232 lb 2.3 oz)       Body mass index is 28.93 kg/m².      Intake/Output Summary (Last 24 hours) at 7/13/2022 1054  Last data filed at 7/13/2022 0930  Gross per 24 hour   Intake 2029.98 ml   Output 6575 ml   Net -4545.02 ml         Hemodynamic Parameters:       Telemetry: ST    Physical Exam  Vitals and nursing note reviewed.   Constitutional:       Appearance: Normal appearance.      Comments: Sitting in chair   HENT:      Head: Normocephalic and atraumatic.   Eyes:      Extraocular Movements: Extraocular movements intact.      Conjunctiva/sclera: Conjunctivae normal.   Neck:      Comments: JVP visible at level of ear sitting upright in chair  Cardiovascular:      Rate and Rhythm: Regular rhythm. Tachycardia present.      Comments: Smooth VAD hum  Pulmonary:      Breath sounds: Normal breath  sounds.   Abdominal:      Palpations: Abdomen is soft.   Musculoskeletal:         General: No swelling.      Cervical back: Neck supple.      Right lower leg: No edema.      Left lower leg: No edema.   Skin:     General: Skin is dry.      Capillary Refill: Capillary refill takes 2 to 3 seconds.   Neurological:      General: No focal deficit present.      Mental Status: He is alert and oriented to person, place, and time.      Motor: No weakness.   Psychiatric:         Mood and Affect: Mood normal.         Behavior: Behavior normal.         Thought Content: Thought content normal.         Judgment: Judgment normal.       Significant Labs:  CBC:  Recent Labs   Lab 07/11/22  0234 07/12/22  0309 07/13/22  0312   WBC 13.73* 13.64* 12.08   RBC 2.59* 2.64* 2.71*   HGB 7.0* 7.1* 7.1*   HCT 22.3* 22.9* 22.8*    433 445   MCV 86 87 84   MCH 27.0 26.9* 26.2*   MCHC 31.4* 31.0* 31.1*       BNP:  Recent Labs   Lab 07/08/22  0408 07/11/22  0234 07/13/22  0312   * 711* 624*       CMP:  Recent Labs   Lab 07/11/22  0234 07/11/22  1115 07/12/22  0309 07/12/22  0810 07/12/22  2119 07/13/22  0008 07/13/22  0312   *  --  186*  --   --  194* 163*   CALCIUM 9.2  --  8.9  --   --  9.1 9.0   ALBUMIN 2.9*  --  3.0*  --   --   --  2.8*   PROT 6.5  --  6.8  --   --   --  7.5   *  --  135*  --   --  133* 132*   K 3.9   < > 3.1*  2.9*   < > 3.3* 3.6 3.2*  3.2*   CO2 31*  --  29  --   --  29 31*   CL 94*  --  92*  --   --  94* 89*   BUN 23*  --  15  --   --  11 11   CREATININE 1.2  --  1.3  --   --  1.1 1.1   ALKPHOS 156*  --  161*  --   --   --  165*   ALT 73*  --  62*  --   --   --  51*   AST 41*  --  40  --   --   --  37   BILITOT 0.8  --  0.9  --   --   --  0.9    < > = values in this interval not displayed.        Coagulation:   Recent Labs   Lab 07/11/22  0234 07/12/22  0309 07/12/22  0810 07/13/22  0312   INR 2.2*  --  2.4* 2.5*   APTT 32.3* 33.1*  --  32.9*       LDH:  Recent Labs   Lab 07/11/22  0234  "07/12/22  0309 07/13/22  0312   * 434* 415*       Microbiology:  Microbiology Results (last 7 days)       Procedure Component Value Units Date/Time    Blood culture [[id number]] Collected: 07/02/22 1629    Order Status: Completed Specimen: Blood from Peripheral, Hand, Right Updated: 07/07/22 1812     Blood Culture, Routine No growth after 5 days.    Blood culture [[id number]] Collected: 07/02/22 1618    Order Status: Completed Specimen: Blood from Peripheral, Antecubital, Right Updated: 07/07/22 1812     Blood Culture, Routine No growth after 5 days.            I have reviewed all pertinent labs within the past 24 hours.    Estimated Creatinine Clearance: 120.6 mL/min (based on SCr of 1.1 mg/dL).    Diagnostic Results:  I have reviewed and interpreted all pertinent imaging results/findings within the past 24 hours.    Assessment and Plan:     36 yo male with NIDCM with BiV systolic heart failure, on home Milrinone at 0.375 mcg/kg/min, presented at [facility] 4/2/22 ,was not evaluated for OHT as he has recently quit smoking in April 2022 but was approved for VAD with plan to begin OHT evaluation in upcoming months if Mr Queen is stable and suitable for OHT eval (blood group A), 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 Went to ED at [facility] 6/10 but left after waiting 4 hours. Had clinic appointment with us today, but arrived to [facility] early this morning and decided to go to the ED instead. Baseline Lasix dose is 80 mg bid. Reports taking 240 mg qd past 3 days with no improvement. BNP is 1701, up from 898 on 6/2 and 49 on 5/24. sCr is 1.8 with baseline ~ 1.5-1.7. sPO2 on RA is 93%. Wife " at bedside    He has been given Lasix 80 mg IVP in the ED with plan to start Lasix gtt at 20 mg/hr           * Acute on chronic combined systolic and diastolic congestive heart failure, NYHA class 4  - NID  - Was not evaluated for OHTx as he quit smoking in April 2022.   - Received HM3 on 6/29  - Hypervolemic on Lasix gtt, increased to 40 mg/hr overnight  - Net -ve 3.2 L/24 hours   - SVO2 41, CO 6.7, CI 2.8,  on epi 0.03. Midodrine discontinued 6/11  - ECHO 7/12- AV does not open, IVS midline, moderate-severe reduced RVSF, LVEDD 5.02 with HM3 speed set to 5200 rpm (decreased from 5300 rpm the day prior)   - See LVAD    Tingling in extremities  - Pt reports paresthesias in right toes and fingers x 3 days   - Possible radiculopathy vs electrolyte abnormality vs neurovascular etiology   - Consulted General Neurology, would appreciate their recommendations     Hyponatremia  -Hypervolemic, now improved  -Discontinued salt tablets   -Improvement in Na with diuresis    LVAD (left ventricular assist device) present  - S/P DT HM3 with MVR plus Protek Duo for RV support 6/29 (Grecia)   - RVAD removed with concern for hemolysis  - Marcella increased to 20 (now off),  off, remains on epi 0.03  - IV Lasix gtt increased to 20 mg/hr overnight with improved volume status - see ADHF   - HTS primary   - AC GASTON trial   - LDH stable  - Speed set at 5200 (increased to 5300 on 7/6 then decreased to 5200 rpm 7/11)  - ECHO 7/12: IVS midline, AV does not open, moderate-severe reduced RVSF, LVEDD 5.02 with HM3 speed set to 5200 rpm (decreased the day prior)   Procedure: Device Interrogation Including analysis of device parameters  Current Settings: Ventricular Assist Device  Review of device function is stable    TXP LVAD INTERROGATIONS 7/13/2022 7/13/2022 7/13/2022 7/13/2022 7/13/2022 7/13/2022 7/13/2022   Type HeartMate3 HeartMate3 HeartMate3 HeartMate3 HeartMate3 HeartMate3 HeartMate3   Flow 4.5 4.5 4.4 4.5 4.3 4.3 4.3   Speed  5200 5200 5200 5200 5200 5200 5200   PI 3.4 3.8 3.8 3.5 3.3 4.7 4.1   Power (Serna) 3.7 3.7 3.7 3.7 3.7 3.8 3.8   LSL - - - - - - 4800   Pulsatility - - - - - - Intermittent pulse       Staphylococcus epidermidis bacteremia  - Blood cultures 6/20 and repeat 6/21 positive for Staph Epi. One of two blood cultures from 6/23 positive for Staph (taken prior to line exchange). Repeat cultures sent 6/25 NGTD.   - IJ exchanged on 6/23, IABP exchanged 6/23  - ID recommended 14 day course of vancomycin from VAD implantation on 6/29 with end date: 7/12/22. Vancomycin discontinued per CTS with concerns for elevated sCr. ID with new recs to complete 7d course of daptomycin, which was completed 7/7/22      Uncontrolled diabetes mellitus  Admitted 5/24-5/27 with hyperglycemia hyperosmolar syndrome  - Hgb A1C 9.2 on 5/20/22  - Endocrine following, on insulin gtt    CKD (chronic kidney disease) stage 3, GFR 30-59 ml/min  SCr baseline ~ 1.5-1.7        Uninterrupted Critical Care/Counseling Time (not including procedures): 60 minutes      Denise Espinoza PA-C  Heart Transplant  [facility] - Surgical Intensive [facility]

## 2022-07-13 NOTE — SUBJECTIVE & OBJECTIVE
Past Medical History:   Diagnosis Date    Arthritis     Cardiomyopathy     CHF (congestive heart failure) 10/01/2020    Diabetes mellitus     Dilated cardiomyopathy 10/26/2020    Drug abuse 10/2020    Hyperosmolar hyperglycemic state (HHS) 5/25/2022    ICD (implantable cardioverter-defibrillator) in place 10/26/2020    Muscle cramping 6/15/2022    Renal disorder      Past Surgical History:   Procedure Laterality Date    APPLICATION OF WOUND VACUUM-ASSISTED CLOSURE DEVICE N/A 6/30/2022    Procedure: APPLICATION, WOUND VAC;  Surgeon: Luis F Paige MD;  Location: [facility] OR [address] [place] [address];  Service: Cardiovascular;  Laterality: N/A;  50 x 5 cm    CARDIAC DEFIBRILLATOR PLACEMENT      IMPLANTATION OF RIGHT VENTRICULAR ASSIST DEVICE (RVAD) N/A 6/29/2022    Procedure: INSERTION, RVAD;  Surgeon: Luis F Paige MD;  [facility];  Service: Cardiovascular;  Laterality: N/A;    INSERTION OF GRAFT TO PERICARDIUM Right 6/30/2022    Procedure: INSERTION-RIGHT VENTRICULAR ASSIST DEVICE;  Surgeon: Luis F Paige MD;  [address];  Service: Cardiovascular;  Laterality: Right;    IRRIGATION OF MEDIASTINUM  6/30/2022    Procedure: IRRIGATION, MEDIASTINUM;  Surgeon: Luis F Paige MD;  [facility];  Service: Cardiovascular;;    LEFT VENTRICULAR ASSIST DEVICE Left 6/23/2022    Procedure: INSERTION-LEFT VENTRICULAR ASSIST DEVICE;  Surgeon: uLis F Paige MD;  [facility];  Service: Cardiovascular;  Laterality: Left;    LEFT VENTRICULAR ASSIST DEVICE N/A 6/29/2022    Procedure: INSERTION-LEFT VENTRICULAR ASSIST DEVICE;  Surgeon: Luis F Paige MD;  [facility];  Service: Cardiovascular;  Laterality: N/A;    RIGHT HEART CATHETERIZATION Right 4/8/2022    Procedure: INSERTION, CATHETER, RIGHT HEART;  Surgeon: Luca Lopez Jr., MD;  Location: [facility] CATH LAB;  Service: Cardiology;  Laterality: Right;    RIGHT HEART CATHETERIZATION Right 4/19/2022    Procedure: INSERTION,  CATHETER, RIGHT HEART;  Surgeon: Josh Pulido MD;  Location: [facility] CATH LAB;  Service: Cardiology;  Laterality: Right;    STERNAL WOUND CLOSURE N/A 6/30/2022    Procedure: CLOSURE, WOUND, STERNUM;  Surgeon: Luis F Paige MD;  Location: [facility] OR [facility];  Service: Cardiovascular;  Laterality: N/A;     Review of patient's allergies indicates:   Allergen Reactions    Aspirin Other (See Comments)     Mr. Thacker is enrolled in Dr. Paige's Alon Trial and cannot have any aspirin and/or aspirin-containing products. DO NOT cancel any orders for INV Aspirin 100 mg/Placebo. If you have questions, please contact Isabel @ [phone], [phone],[email or web address], secure chat or MS Teams message.    Bumex [bumetanide] Hives    Lactose Diarrhea     Other reaction(s): Abdominal distension, gaseous    Torsemide Hives     No current facility-administered medications on file prior to encounter.     Current Outpatient Medications on File Prior to Encounter   Medication Sig    busPIRone (BUSPAR) 7.5 MG tablet Take 7.5 mg by mouth every 12 (twelve) hours.    EScitalopram oxalate (LEXAPRO) 5 MG Tab Take 1 tablet (5 mg total) by mouth once daily.    furosemide (LASIX) 80 MG tablet Take 80 mg by mouth 2 (two) times daily.    insulin aspart U-100 (NOVOLOG) 100 unit/mL (3 mL) InPn pen Inject 12 Units into the skin 3 (three) times daily.    insulin detemir U-100 (LEVEMIR FLEXTOUCH) 100 unit/mL (3 mL) SubQ InPn pen Inject 23 Units into the skin once daily.    mirtazapine (REMERON) 15 MG tablet Take 15 mg by mouth nightly.    pantoprazole (PROTONIX) 40 MG tablet Take 1 tablet (40 mg total) by mouth once daily.    potassium chloride SA (K-DUR,KLOR-CON) 20 MEQ tablet Take 2 tablets (40 mEq total) by mouth once daily.    sucralfate (CARAFATE) 1 gram tablet Take 1 g by mouth nightly.    albuterol (PROVENTIL/VENTOLIN HFA) 90 mcg/actuation inhaler INHALE 2 PUFFS BY MOUTH EVERY 4 HOURS AS NEEDED FOR COUGH OR WHEEZING    blood sugar  "diagnostic Strp Use to test blood glucose 4 (four) times daily.    blood-glucose meter Misc Use as instructed    lancets 33 gauge Misc Use to test blood glucose 4 (four) times daily.    milrinone (PRIMACOR) 1 mg/mL injection Inject into the vein.    milrinone 20mg/100ml D5W, 200mcg/ml, (PRIMACOR) 20 mg/100 mL (200 mcg/mL) infusion Inject 34.875 mcg/min into the vein continuous.    pen needle, diabetic 31 gauge x 1/4" Ndle 1 Device by Misc.(Non-Drug; Combo Route) route 4 (four) times daily.    promethazine (PHENERGAN) 12.5 MG Tab Take 1 tablet (12.5 mg total) by mouth every 6 (six) hours as needed (nausea).    traMADoL (ULTRAM) 50 mg tablet Take 1 tablet (50 mg total) by mouth every 6 (six) hours as needed for Pain.    [DISCONTINUED] digoxin (LANOXIN) 125 mcg tablet Take 1 tablet (0.125 mg total) by mouth once daily.    [DISCONTINUED] insulin (LANTUS SOLOSTAR U-100 INSULIN) glargine 100 units/mL (3mL) SubQ pen Inject 10 Units into the skin every evening. (Patient not taking: Reported on 2022)    [DISCONTINUED] metOLazone (ZAROXOLYN) 2.5 MG tablet Take 2.5 mg by mouth every other day.    [DISCONTINUED] metoprolol succinate (TOPROL-XL) 25 MG 24 hr tablet TAKE 1 TABLET(25 MG) BY MOUTH EVERY DAY    [DISCONTINUED] spironolactone (ALDACTONE) 25 MG tablet Take 1 tablet (25 mg total) by mouth once daily.     Family History       Problem Relation (Age of Onset)    Diverticulosis Brother    Heart attack Maternal Grandmother, Maternal Grandfather    Heart failure Father          Tobacco Use    Smoking status: Former Smoker     Packs/day: 0.50     Years: 16.00     Pack years: 8.00     Start date: 10/1/2004     Quit date: 2021     Years since quittin.2    Smokeless tobacco: Never Used   Substance and Sexual Activity    Alcohol use: Not Currently    Drug use: Not Currently     Types: Marijuana, MDMA (Ecstacy)    Sexual activity: Yes     Partners: Female     Birth control/protection: None     Review of Systems "   Constitutional:  Negative for chills and fever.   HENT:  Negative for trouble swallowing and voice change.    Eyes:  Negative for visual disturbance.   Respiratory:  Negative for cough.    Cardiovascular:  Negative for chest pain.   Gastrointestinal:  Negative for nausea and vomiting.   Genitourinary:  Negative for dysuria.   Skin:  Negative for rash.   Neurological:  Positive for numbness (tingling). Negative for dizziness, tremors, facial asymmetry, speech difficulty, weakness and headaches.   Psychiatric/Behavioral:  Negative for agitation and confusion.    Objective:     Vital Signs (Most Recent):  Temp: 98.8 °F (37.1 °C) (07/13/22 0715)  Pulse: 105 (07/13/22 1100)  Resp: 16 (07/13/22 1100)  BP: (!) 86/0 (07/13/22 0800)  SpO2: 98 % (07/13/22 0800)   Vital Signs (24h Range):  Temp:  [98.2 °F (36.8 °C)-98.8 °F (37.1 °C)] 98.8 °F (37.1 °C)  Pulse:  [] 105  Resp:  [9-36] 16  SpO2:  [96 %-98 %] 98 %  BP: (80-90)/(0) 86/0  Arterial Line BP: ()/(54-98) 80/68     Weight: 105 kg (231 lb 7.7 oz)  Body mass index is 28.93 kg/m².    Physical Exam  Vitals reviewed.   Constitutional:       General: He is not in acute distress.     Appearance: He is well-developed.   HENT:      Head: Normocephalic and atraumatic.      Right Ear: External ear normal.      Left Ear: External ear normal.      Nose: Nose normal.   Eyes:      General: No scleral icterus.     Extraocular Movements: EOM normal.      Pupils: Pupils are equal, round, and reactive to light.   Cardiovascular:      Rate and Rhythm: Normal rate.   Pulmonary:      Effort: Pulmonary effort is normal. No respiratory distress.   Abdominal:      General: There is no distension.   Musculoskeletal:         General: Normal range of motion.      Cervical back: Normal range of motion.      Right lower leg: No edema.      Left lower leg: No edema.   Skin:     General: Skin is warm and dry.   Neurological:      Mental Status: He is alert and oriented to person, place,  and time.      Coordination: Finger-Nose-Finger Test normal.      Deep Tendon Reflexes:      Reflex Scores:       Bicep reflexes are 2+ on the right side and 2+ on the left side.       Brachioradialis reflexes are 2+ on the right side and 2+ on the left side.       Patellar reflexes are 2+ on the right side and 2+ on the left side.  Psychiatric:         Mood and Affect: Mood normal.         Speech: Speech normal.         Behavior: Behavior normal.       NEUROLOGICAL EXAMINATION:     MENTAL STATUS   Oriented to person, place, and time.   Attention: normal. Concentration: normal.   Speech: speech is normal   Level of consciousness: alert  Knowledge: good.   Normal comprehension.     CRANIAL NERVES     CN III, IV, VI   Pupils are equal, round, and reactive to light.  Extraocular motions are normal.   Nystagmus: none   Diplopia: none  Ophthalmoparesis: none    CN V   Facial sensation intact.     CN VII   Facial expression full, symmetric.     CN VIII   Hearing: intact    CN IX, X   Palate: symmetric    CN XI   CN XI normal.     CN XII   CN XII normal.     MOTOR EXAM   Muscle bulk: normal  Overall muscle tone: normal    Strength   Right deltoid: 5/5  Left deltoid: 5/5  Right biceps: 5/5  Left biceps: 5/5  Right triceps: 5/5  Left triceps: 5/5  Right interossei: 5/5  Left interossei: 5/5  Right iliopsoas: 5/5  Left iliopsoas: 5/5  Right quadriceps: 5/5  Left quadriceps: 5/5  Right anterior tibial: 5/5  Left anterior tibial: 5/5  Right posterior tibial: 5/5  Left posterior tibial: 5/5  Right peroneal: 5/5  Left peroneal: 5/5    REFLEXES     Reflexes   Right brachioradialis: 2+  Left brachioradialis: 2+  Right biceps: 2+  Left biceps: 2+  Right patellar: 2+  Left patellar: 2+    SENSORY EXAM        Diminished light touch and vibration left hand   1st-3rd digit     Diminished light touch left foot from ankle to toes  Dorsal and plantar aspect     GAIT AND COORDINATION     Gait  Gait: (deferred)     Coordination   Finger to  nose coordination: normal    Tremor   Resting tremor: absent    Significant Labs: Hemoglobin A1c: No results for input(s): HGBA1C in the last 720 hours.  Recent Lab Results  (Last 5 results in the past 24 hours)        07/13/22  1050   07/13/22  0830   07/13/22  0419   07/13/22  0312   07/13/22  0008        Albumin       2.8         Alkaline Phosphatase       165         Allens Test     N/A           ALT       51         Anion Gap       12   10       aPTT       32.9  Comment: aPTT therapeutic range = 39-69 seconds         AST       37         Baso #       0.05         Basophil %       0.4         BILIRUBIN TOTAL       0.9  Comment: For infants and newborns, interpretation of results should be based  on gestational age, weight and in agreement with clinical  observations.    Premature Infant recommended reference ranges:  Up to 24 hours.............<8.0 mg/dL  Up to 48 hours............<12.0 mg/dL  3-5 days..................<15.0 mg/dL  6-29 days.................<15.0 mg/dL           BNP       624  Comment: Values of less than 100 pg/ml are consistent with non-CHF populations.         Site     Other           BUN       11   11       Calcium       9.0   9.1       Chloride       89   94       CO2       31   29       Creatinine       1.1   1.1       CRP       45.2         [facility]     Room Air           Differential Method       Automated         eGFR if        >60.0   >60.0       eGFR if non        >60.0  Comment: Calculation used to obtain the estimated glomerular filtration  rate (eGFR) is the CKD-EPI equation.      >60.0  Comment: Calculation used to obtain the estimated glomerular filtration  rate (eGFR) is the CKD-EPI equation.          Eos #       0.1         Eosinophil %       0.9         Fibrinogen       540         Glucose       163   194       Gran # (ANC)       9.0         Gran %       74.3         Hematocrit       22.8         Hemoglobin       7.1         Immature Grans (Abs)        0.05  Comment: Mild elevation in immature granulocytes is non specific and   can be seen in a variety of conditions including stress response,   acute inflammation, trauma and pregnancy. Correlation with other   laboratory and clinical findings is essential.           Immature Granulocytes       0.4         INR       2.5  Comment: Coumadin Therapy:  2.0 - 3.0 for INR for all indicators except mechanical heart valves  and antiphospholipid syndromes which should use 2.5 - 3.5.           LD       415  Comment: Results are increased in hemolyzed samples.         Lymph #       1.8         Lymph %       14.7         Magnesium 1.8       2.0         MCH       26.2         MCHC       31.1         MCV       84         Mono #       1.1         Mono %       9.3         MPV       9.1         nRBC       0         Platelets       445         POC BE     11           POC HCO3     34.7           POC PCO2     50.3           POC PH     7.447           POC PO2     23           POC SATURATED O2     41           POC TCO2     36           POCT Glucose   175             Potassium 4.0       3.2   3.6              3.2         PROTEIN TOTAL       7.5         Protime       24.7         RBC       2.71         RDW       17.3         Sample     VENOUS           Sodium       132   133       WBC       12.08                              Significant Imaging: I have reviewed all pertinent imaging results/findings within the past 24 hours.

## 2022-07-13 NOTE — HPI
37 year-old male with a history of NICM s/p LVAD placement on 6/29/22. He initially presented to ED 6/13/22 with chest pain, orthopnea, SOB, extremity and abdominal swelling and was admitted to heart transplant service for advanced heart failure options and subsequently underwent LVAD placement on 6/29/22. He was admitted to SICU, intubated and sedated, and returned to OR on 6/30 for chest closure. On 7/18, patient inadvertently removed his LVAD drive line, and experienced chills afterwards. Found to have an elevated white count 20.78 on 7/18, and was started on empiric antibiotics (vancomycin and cefepime). He was sent to the cardiac step down unit from the ICU on 7/19. General neurology was consulted for seizure-like episodes. The patient had one episode yesterday and one this morning. The patient says that he stood up yesterday, then sat down and immediately felt dizzy. He then began having convulsions of both his upper and lower extremities. This was witnessed by his wife. She states that he was unaware during the event, and the patient says that he could neither hear nor see during the episode. There was no associated tongue biting or other injury, or loss of control of bowel or bladder. This happened while sitting, and patient did not fall out of the bed. His wife says that he was leaning to the right side during the episode. There was no observed eye deviation during the episode. Convulsions lasted approximately 1 minute. Patient denies any symptoms preceding the episode, such as visual/hearing changes, epigastric rising sensation. His wife says that he did not have confusion following the episode. This morning, the patient says that he stood up again, then sat down, and experienced a similar episode. The convulsions were again bilateral, without preceding symptoms or confusion following the event, and there was again no tongue biting/other associated injury, or urinary/bowel incontinence. The episode this  morning lasted approximately 2 seconds. Patient denies any prior seizure-like episodes, history of AED treatment, family history of seizure; denies history of cancer, congential neurological/cardiac defects. He does have a history of substance abuse, and endorses prior MDMA and marijuana use, but states that he has not used MDMA in years, and not used marijuana for 1 year. Denies any drug use while in the hospital. He also denies any alcohol use both during this hospital stay and before. Denies fevers, chills, neck stiffness/pain, vision change, hearing change, weakness, pre-syncope, syncope, chest pain, shortness of breath, nausea, vomiting, diarrhea, dysuria, hematuria. States that he is feeling well today, and has been well over the last two days, aside from the convulsive episodes.

## 2022-07-13 NOTE — CARE UPDATE
BG goal 140-180    -A1C   Lab Results   Component Value Date    HGBA1C 9.2 (H) 05/20/2022         -HOME REGIMEN: Detemir  23  units daily and Aspart   12  units TIDWM and  Mod dose correction insulin    -INPATIENT REGIMEN: levemir 18 units daily and novolog 8 units with meals.     -GLUCOSE TREND FOR THE PAST 24HRS: 131-278    -NO HYPOGYCEMIAS NOTED     -TOLERATING 50% OF PO DIET     -Diet: Diet Cardiac Fluid - 1500mL        Remains in SICU, NAEON. Creatinine 1.1. BG reasonably well controlled with current regimen. LVAD. 13 Days Post-Op      Plan:  continue levemir 18 units daily.   Increase to novolog 10 units with meals.   BG monitoring ac/hs and moderate dose correction scale.     Discharge planning:tbd     Endocrine to continue to follow    ** Please call Endocrine for any BG related issues **

## 2022-07-13 NOTE — ASSESSMENT & PLAN NOTE
- S/P DT HM3 with MVR plus Protek Duo for RV support 6/29 (Grecia)   - RVAD removed with concern for hemolysis  - Marcella increased to 20 (now off),  off, remains on epi 0.03  - IV Lasix gtt increased to 20 mg/hr overnight with improved volume status - see ADHF   - HTS primary   - AC GASTON trial   - LDH stable  - Speed set at 5200 (increased to 5300 on 7/6 then decreased to 5200 rpm 7/11)  - ECHO 7/12: IVS midline, AV does not open, moderate-severe reduced RVSF, LVEDD 5.02 with HM3 speed set to 5200 rpm (decreased the day prior)   Procedure: Device Interrogation Including analysis of device parameters  Current Settings: Ventricular Assist Device  Review of device function is stable    TXP LVAD INTERROGATIONS 7/13/2022 7/13/2022 7/13/2022 7/13/2022 7/13/2022 7/13/2022 7/13/2022   Type HeartMate3 HeartMate3 HeartMate3 HeartMate3 HeartMate3 HeartMate3 HeartMate3   Flow 4.5 4.5 4.4 4.5 4.3 4.3 4.3   Speed 5200 5200 5200 5200 5200 5200 5200   PI 3.4 3.8 3.8 3.5 3.3 4.7 4.1   Power (Serna) 3.7 3.7 3.7 3.7 3.7 3.8 3.8   LSL - - - - - - 4800   Pulsatility - - - - - - Intermittent pulse

## 2022-07-13 NOTE — PLAN OF CARE
Problem: Occupational Therapy  Goal: Occupational Therapy Goal  Description: Goals to be met by: 8/2/22     Patient will increase functional independence with ADLs by performing:    UE Dressing with Modified Mason.  LE Dressing with Modified Mason and Assistive Devices as needed.  Grooming while standing at sink with Modified Mason.  Toileting from toilet with Modified Mason for hygiene and clothing management.   Bathing from  shower chair/bench with Modified Mason.  Toilet transfer to toilet with Modified Mason.  Increased functional strength to WFL for B UE.  Upper extremity exercise program x15 reps per handout, with assistance as needed.  Pt will be I in all LVAD management.    Outcome: Ongoing, Progressing

## 2022-07-13 NOTE — ASSESSMENT & PLAN NOTE
Admitted 5/24-5/27 with hyperglycemia hyperosmolar syndrome  - Hgb A1C 9.2 on 5/20/22  - Endocrine following, on insulin gtt

## 2022-07-13 NOTE — PROGRESS NOTES
07/13/2022  Enriquetyrone Awan Jr    Current provider:  Josh Pulido MD    Device interrogation:  TXP LVAD INTERROGATIONS 7/13/2022 7/13/2022 7/13/2022 7/13/2022 7/13/2022 7/13/2022 7/12/2022   Type HeartMate3 HeartMate3 HeartMate3 HeartMate3 HeartMate3 HeartMate3 HeartMate3   Flow 4.5 4.3 4.3 4.3 4.6 4.6 4.6   Speed 5200 5200 5200 5200 5200 5200 5200   PI 3.5 3.3 4.7 4.1 3.5 3.7 2.8   Power (Serna) 3.7 3.7 3.8 3.8 3.7 3.7 3.7   LSL - - - 4800 - 4800 4800   Pulsatility - - - Intermittent pulse - - Intermittent pulse          Rounded on Kevan Queen to ensure all mechanical assist device settings (IABP or VAD) were appropriate and all parameters were within limits.  I was able to ensure all back up equipment was present, the staff had no issues, and the Perfusion Department daily rounding was complete.      For implantable VADs: Interrogation of Ventricular assist device was performed with analysis of device parameters and review of device function. I have personally reviewed the interrogation findings and agree with findings as stated.     In emergency, the nursing units have been notified to contact the perfusion department either by:  Calling [phone] from 630am to 4pm Mon thru Fri, utilizing the On-Call Finder functionality of Epic and searching for Perfusion, or by contacting the hospital  from 4pm to 630am and on weekends and asking to speak with the perfusionist on call.    8:33 AM

## 2022-07-13 NOTE — PLAN OF CARE
Problem: Physical Therapy  Goal: Physical Therapy Goal  Description: Goals to be met by: 2022    Patient will increase functional independence with mobility by performin. Supine to sit with MInimal Assistance -not met  2. Sit to stand transfer with Minimal Assistance - met   2a. Sit to stand transfer with supervision- met   2b. Sit to stand with independence   3. Gait  x 50 feet with Contact Guard Assistance - met    3a. Gait 400 ft with Supervision       Outcome: Ongoing, Progressing   Goal 2a and 3 met. Goal 2b and goal 3 created. Treatment completed. Goals remain appropriate

## 2022-07-13 NOTE — PT/OT/SLP PROGRESS
Physical Therapy Treatment    Patient Name:  Kevan Queen   MRN:  [id number]  Admit Date: 2022  Admitting Diagnosis:  Acute on chronic combined systolic and diastolic congestive heart failure, NYHA class 4   Length of Stay: 30 days  Recent Surgery: Procedure(s) (LRB):  CLOSURE, WOUND, STERNUM (N/A)  INSERTION-RIGHT VENTRICULAR ASSIST DEVICE (Right)  APPLICATION, WOUND VAC (N/A)  IRRIGATION, MEDIASTINUM 13 Days Post-Op    Recommendations:     Discharge Recommendations:  home (with assistance)   Discharge Equipment Recommendations: none   Barriers to discharge: None    Plan:     During this hospitalization, patient to be seen 5 x/week to address the listed problems via gait training, therapeutic exercises, therapeutic activities, neuromuscular re-education  · Plan of Care Expires:  22   Plan of Care Reviewed with: patient    Assessment:     Kevan Queen is a 37 y.o. male admitted with a medical diagnosis of Acute on chronic combined systolic and diastolic congestive heart failure, NYHA class 4.   Patient found alert and cooperative with therapy. Vitals remained stable. Patient improved initial ambulation distance before standing rest break needed to be taken. Patient has room to improve activity tolerance in order to walk community distance safely. Patient is appropriate to return home upon hospital discharge.     Problem List: weakness, impaired endurance, gait instability, decreased upper extremity function, impaired cardiopulmonary response to activity.  Rehab Prognosis: Good     GOALS:   Multidisciplinary Problems     Physical Therapy Goals        Problem: Physical Therapy    Goal Priority Disciplines Outcome Goal Variances Interventions   Physical Therapy Goal     PT, PT/OT Ongoing, Progressing     Description: Goals to be met by: 2022    Patient will increase functional independence with mobility by performin. Supine to sit with MInimal Assistance -not met  2. Sit to stand transfer with  "Minimal Assistance - met   2a. Sit to stand transfer with supervision- met   2b. Sit to stand with independence   3. Gait  x 50 feet with Contact Guard Assistance - met    3a. Gait 400 ft with Supervision                  Multidisciplinary Problems (Resolved)        Problem: Physical Therapy    Goal Priority Disciplines Outcome Goal Variances Interventions   Physical Therapy Goal   (Resolved)     PT, PT/OT Met     Description:     Problem: Physical Therapy  Goal: Physical Therapy Goal  Description: Goals to be met by: 2022     Patient will increase functional independence with mobility by performin. Lower extremity exercise program without IABP 30 reps per handout, with independence  2.Upper extremity exercise program 30 reps per handout, with independence                         Subjective   Communicated with RN prior to session.  Patient found up in chair upon PT entry to room, agreeable to evaluation. Kevan Queen alone during session.    Chief Complaint: pain at incision   Patient/Family Comments/goals: to return to PLOF   Pain/Comfort:  · Pain Rating 1: 8/10  · Location 1: sternal  · Pain Addressed 1: Reposition  · Pain Rating Post-Intervention 1: 8/10    Objective:   Patient found with: LVAD, PICC line, arterial line, telemetry, pulse ox (continuous), blood pressure cuff   General Precautions: Standard, Cardiac fall, LVAD   Orthopedic Precautions:N/A   Braces: N/A   Oxygen Device: Room Air  Vitals: BP (!) 86/0 (BP Location: Left arm, Patient Position: Lying)   Pulse 97   Temp 98.2 °F (36.8 °C) (Oral)   Resp 16   Ht 6' 3" (1.905 m)   Wt 105 kg (231 lb 7.7 oz)   SpO2 98%   BMI 28.93 kg/m²     Outcome Measures:  AM-PAC 6 CLICK MOBILITY  Turning over in bed (including adjusting bedclothes, sheets and blankets)?: 3  Sitting down on and standing up from a chair with arms (e.g., wheelchair, bedside commode, etc.): 3  Moving from lying on back to sitting on the side of the bed?: " 3  Moving to and from a bed to a chair (including a wheelchair)?: 3  Need to walk in hospital room?: 3  Climbing 3-5 steps with a railing?: 2  Basic Mobility Total Score: 17     Cognition:   · Alert and Cooperative  · Command following: Follows multistep  commands  · Fluency: clear/fluent    Functional Mobility:  Additional staff present: Supervising PT  Bed Mobility:    Not performed secondary to being found upright in chair.     Transfers:    Sit <> Stand Transfer: supervision with no assistive device   o From Chair   o Patient able to recall and maintain sternal precautions       Gait:  · Patient ambulated: 100ft +30 ft + 50 ft with standing rest breaks in between trials   · Patient required: standy by assistance  · Patient used: no assistive device  · Gait Pattern observed: reciprocal gait  · Gait Deviation(s): occasional unsteady gait  · Impairments due to: decreased endurance  · Comments:   · Mask donned; Iv pole, portable monitor, and emergency bag taken; RN notified   · SOB after 50 ft of ambulation that increased to moderate levels   · Educated patient on pacing       Therapeutic Activities, Exercises, and Education:   Patient educated on PT role and POC.  Patient educated on importance of daily ambulation and OOB activity.   Patient educated on sternal precautions.     Patient verbalized understanding.             Patient left up in chair with all lines intact, call button in reach and RN notified and reconnected to wall power.     Time Tracking:     PT Received On: 07/13/22  PT Start Time: 1014     PT Stop Time: 1028  PT Total Time (min): 14 min       Billable Minutes:   · Gait Training 14    Treatment Type: Treatment  PT/PTA: PT

## 2022-07-13 NOTE — PROGRESS NOTES
Interdisciplinary Rounds Report:   Attended interdisciplinary rounds with the \[facility]\""/CTS services including the LVAD Coordinators, social workers, cardiologists, surgeons,  PT/OT/Speech, dietician, and unit charge nurses. Discussed patient status, plan of care, goals of care, including DC date, and post discharge needs. Plan of care will be discussed with the patient and/or family per the physician while rounding on the floor. This is a recurring meeting that is medically and socially necessary to collaborate with the interdisciplinary team to assist patient needs and safe discharge.

## 2022-07-13 NOTE — ASSESSMENT & PLAN NOTE
- Pt reports paresthesias in right toes and fingers x 3 days   - Possible radiculopathy vs electrolyte abnormality vs neurovascular etiology   - Consulted General Neurology, would appreciate their recommendations

## 2022-07-13 NOTE — CONSULTS
[facility] - Surgical Intensive Care  Neurology  Consult Note    Patient Name: Kevan Queen  MRN: [id number]  Admission Date: 6/13/2022  Hospital Length of Stay: 30 days  Code Status: Full Code   Attending Provider: Josh Pulido MD   Consulting Provider: Marcella Darden PA-C  Primary Care Physician: ORALIA Cline  Principal Problem:Acute on chronic combined systolic and diastolic congestive heart failure, NYHA class 4    Inpatient consult to Neurology  Consult performed by: Marcella Darden PA-C  Consult ordered by: Denise Espinoza PA-C         Subjective:     Chief Complaint:  Tingling      HPI:   37 y.o. male with PMHx of NICM s/p IABP and ICD, uncontrolled diabetes, CKD, polysubstance abuse presented to ED 6/13/22 with chest pain, orthopnea, SOB, and extremity and abdominal swelling and was admitted to heart transplant service for advanced heart failure options and subsequently underwent LVAD placement on 6/29/22. Per chart review, procedure was complicated by R ventricular failure coming off the pump and required interventional cardiology for intra-op placement of RVAD. Procedure lasted ~6 hours. He was admitted to SICU, intubated and sedated and returned to OR 6/30 for chest closure. General neurology now consulted 7/13 for subjective paresthesias described as tingling in toes and left fingers only in digits 1-3. Decreased sensation in L foot from ankle down to toes. Says sensory changes have not improved or worsened over the past few days. Denies weakness, visual disturbance, headaches and cognitive change. Denies previous episodes of similar sensory disturbance.      Past Medical History:   Diagnosis Date    Arthritis     Cardiomyopathy     CHF (congestive heart failure) 10/01/2020    Diabetes mellitus     Dilated cardiomyopathy 10/26/2020    Drug abuse 10/2020    Hyperosmolar hyperglycemic state (HHS) 5/25/2022    ICD (implantable cardioverter-defibrillator) in place 10/26/2020     Muscle cramping 6/15/2022    Renal disorder      Past Surgical History:   Procedure Laterality Date    APPLICATION OF WOUND VACUUM-ASSISTED CLOSURE DEVICE N/A 6/30/2022    Procedure: APPLICATION, WOUND VAC;  Surgeon: Luis F Paige MD;  [facility];  Service: Cardiovascular;  Laterality: N/A;  50 x 5 cm    CARDIAC DEFIBRILLATOR PLACEMENT      IMPLANTATION OF RIGHT VENTRICULAR ASSIST DEVICE (RVAD) N/A 6/29/2022    Procedure: INSERTION, RVAD;  Surgeon: Luis F Paige MD;  [facility];  Service: Cardiovascular;  Laterality: N/A;    INSERTION OF GRAFT TO PERICARDIUM Right 6/30/2022    Procedure: INSERTION-RIGHT VENTRICULAR ASSIST DEVICE;  Surgeon: Luis F Paige MD;  [facility];  Service: Cardiovascular;  Laterality: Right;    IRRIGATION OF MEDIASTINUM  6/30/2022    Procedure: IRRIGATION, MEDIASTINUM;  Surgeon: Luis F Paige MD;  [facility];  Service: Cardiovascular;;    LEFT VENTRICULAR ASSIST DEVICE Left 6/23/2022    Procedure: INSERTION-LEFT VENTRICULAR ASSIST DEVICE;  Surgeon: Luis F Paige MD;  [facility];  Service: Cardiovascular;  Laterality: Left;    LEFT VENTRICULAR ASSIST DEVICE N/A 6/29/2022    Procedure: INSERTION-LEFT VENTRICULAR ASSIST DEVICE;  Surgeon: Luis F Paige MD;  [facility];  Service: Cardiovascular;  Laterality: N/A;    RIGHT HEART CATHETERIZATION Right 4/8/2022    Procedure: INSERTION, CATHETER, RIGHT HEART;  Surgeon: Luca Lopez Jr., MD;  Location: [facility] CATH LAB;  Service: Cardiology;  Laterality: Right;    RIGHT HEART CATHETERIZATION Right 4/19/2022    Procedure: INSERTION, CATHETER, RIGHT HEART;  Surgeon: Josh Pulido MD;  Location: [facility] CATH LAB;  Service: Cardiology;  Laterality: Right;    STERNAL WOUND CLOSURE N/A 6/30/2022    Procedure: CLOSURE, WOUND, STERNUM;  Surgeon: Luis F Paige MD;  [facility];  Service: Cardiovascular;  Laterality: N/A;     Review  of patient's allergies indicates:   Allergen Reactions    Aspirin Other (See Comments)     Mr. Thacker is enrolled in Dr. Paige's Alon Trial and cannot have any aspirin and/or aspirin-containing products. DO NOT cancel any orders for INV Aspirin 100 mg/Placebo. If you have questions, please contact Isabel @ [phone], [phone],[email or web address], secure chat or MS Teams message.    Bumex [bumetanide] Hives    Lactose Diarrhea     Other reaction(s): Abdominal distension, gaseous    Torsemide Hives     No current facility-administered medications on file prior to encounter.     Current Outpatient Medications on File Prior to Encounter   Medication Sig    busPIRone (BUSPAR) 7.5 MG tablet Take 7.5 mg by mouth every 12 (twelve) hours.    EScitalopram oxalate (LEXAPRO) 5 MG Tab Take 1 tablet (5 mg total) by mouth once daily.    furosemide (LASIX) 80 MG tablet Take 80 mg by mouth 2 (two) times daily.    insulin aspart U-100 (NOVOLOG) 100 unit/mL (3 mL) InPn pen Inject 12 Units into the skin 3 (three) times daily.    insulin detemir U-100 (LEVEMIR FLEXTOUCH) 100 unit/mL (3 mL) SubQ InPn pen Inject 23 Units into the skin once daily.    mirtazapine (REMERON) 15 MG tablet Take 15 mg by mouth nightly.    pantoprazole (PROTONIX) 40 MG tablet Take 1 tablet (40 mg total) by mouth once daily.    potassium chloride SA (K-DUR,KLOR-CON) 20 MEQ tablet Take 2 tablets (40 mEq total) by mouth once daily.    sucralfate (CARAFATE) 1 gram tablet Take 1 g by mouth nightly.    albuterol (PROVENTIL/VENTOLIN HFA) 90 mcg/actuation inhaler INHALE 2 PUFFS BY MOUTH EVERY 4 HOURS AS NEEDED FOR COUGH OR WHEEZING    blood sugar diagnostic Strp Use to test blood glucose 4 (four) times daily.    blood-glucose meter Misc Use as instructed    lancets 33 gauge Misc Use to test blood glucose 4 (four) times daily.    milrinone (PRIMACOR) 1 mg/mL injection Inject into the vein.    milrinone 20mg/100ml D5W, 200mcg/ml, (PRIMACOR) 20  "mg/100 mL (200 mcg/mL) infusion Inject 34.875 mcg/min into the vein continuous.    pen needle, diabetic 31 gauge x 1/4" Ndle 1 Device by Misc.(Non-Drug; Combo Route) route 4 (four) times daily.    promethazine (PHENERGAN) 12.5 MG Tab Take 1 tablet (12.5 mg total) by mouth every 6 (six) hours as needed (nausea).    traMADoL (ULTRAM) 50 mg tablet Take 1 tablet (50 mg total) by mouth every 6 (six) hours as needed for Pain.    [DISCONTINUED] digoxin (LANOXIN) 125 mcg tablet Take 1 tablet (0.125 mg total) by mouth once daily.    [DISCONTINUED] insulin (LANTUS SOLOSTAR U-100 INSULIN) glargine 100 units/mL (3mL) SubQ pen Inject 10 Units into the skin every evening. (Patient not taking: Reported on 2022)    [DISCONTINUED] metOLazone (ZAROXOLYN) 2.5 MG tablet Take 2.5 mg by mouth every other day.    [DISCONTINUED] metoprolol succinate (TOPROL-XL) 25 MG 24 hr tablet TAKE 1 TABLET(25 MG) BY MOUTH EVERY DAY    [DISCONTINUED] spironolactone (ALDACTONE) 25 MG tablet Take 1 tablet (25 mg total) by mouth once daily.     Family History       Problem Relation (Age of Onset)    Diverticulosis Brother    Heart attack Maternal Grandmother, Maternal Grandfather    Heart failure Father          Tobacco Use    Smoking status: Former Smoker     Packs/day: 0.50     Years: 16.00     Pack years: 8.00     Start date: 10/1/2004     Quit date: 2021     Years since quittin.2    Smokeless tobacco: Never Used   Substance and Sexual Activity    Alcohol use: Not Currently    Drug use: Not Currently     Types: Marijuana, MDMA (Ecstacy)    Sexual activity: Yes     Partners: Female     Birth control/protection: None     Review of Systems   Constitutional:  Negative for chills and fever.   HENT:  Negative for trouble swallowing and voice change.    Eyes:  Negative for visual disturbance.   Respiratory:  Negative for cough.    Cardiovascular:  Negative for chest pain.   Gastrointestinal:  Negative for nausea and vomiting. "   Genitourinary:  Negative for dysuria.   Skin:  Negative for rash.   Neurological:  Positive for numbness (tingling). Negative for dizziness, tremors, facial asymmetry, speech difficulty, weakness and headaches.   Psychiatric/Behavioral:  Negative for agitation and confusion.    Objective:     Vital Signs (Most Recent):  Temp: 98.8 °F (37.1 °C) (07/13/22 0715)  Pulse: 105 (07/13/22 1100)  Resp: 16 (07/13/22 1100)  BP: (!) 86/0 (07/13/22 0800)  SpO2: 98 % (07/13/22 0800)   Vital Signs (24h Range):  Temp:  [98.2 °F (36.8 °C)-98.8 °F (37.1 °C)] 98.8 °F (37.1 °C)  Pulse:  [] 105  Resp:  [9-36] 16  SpO2:  [96 %-98 %] 98 %  BP: (80-90)/(0) 86/0  Arterial Line BP: ()/(54-98) 80/68     Weight: 105 kg (231 lb 7.7 oz)  Body mass index is 28.93 kg/m².    Physical Exam  Vitals reviewed.   Constitutional:       General: He is not in acute distress.     Appearance: He is well-developed.   HENT:      Head: Normocephalic and atraumatic.      Right Ear: External ear normal.      Left Ear: External ear normal.      Nose: Nose normal.   Eyes:      General: No scleral icterus.     Extraocular Movements: EOM normal.      Pupils: Pupils are equal, round, and reactive to light.   Cardiovascular:      Rate and Rhythm: Normal rate.   Pulmonary:      Effort: Pulmonary effort is normal. No respiratory distress.   Abdominal:      General: There is no distension.   Musculoskeletal:         General: Normal range of motion.      Cervical back: Normal range of motion.      Right lower leg: No edema.      Left lower leg: No edema.   Skin:     General: Skin is warm and dry.   Neurological:      Mental Status: He is alert and oriented to person, place, and time.      Coordination: Finger-Nose-Finger Test normal.      Deep Tendon Reflexes:      Reflex Scores:       Bicep reflexes are 2+ on the right side and 2+ on the left side.       Brachioradialis reflexes are 2+ on the right side and 2+ on the left side.       Patellar reflexes are 2+  on the right side and 2+ on the left side.  Psychiatric:         Mood and Affect: Mood normal.         Speech: Speech normal.         Behavior: Behavior normal.       NEUROLOGICAL EXAMINATION:     MENTAL STATUS   Oriented to person, place, and time.   Attention: normal. Concentration: normal.   Speech: speech is normal   Level of consciousness: alert  Knowledge: good.   Normal comprehension.     CRANIAL NERVES     CN III, IV, VI   Pupils are equal, round, and reactive to light.  Extraocular motions are normal.   Nystagmus: none   Diplopia: none  Ophthalmoparesis: none    CN V   Facial sensation intact.     CN VII   Facial expression full, symmetric.     CN VIII   Hearing: intact    CN IX, X   Palate: symmetric    CN XI   CN XI normal.     CN XII   CN XII normal.     MOTOR EXAM   Muscle bulk: normal  Overall muscle tone: normal    Strength   Right deltoid: 5/5  Left deltoid: 5/5  Right biceps: 5/5  Left biceps: 5/5  Right triceps: 5/5  Left triceps: 5/5  Right interossei: 5/5  Left interossei: 5/5  Right iliopsoas: 5/5  Left iliopsoas: 5/5  Right quadriceps: 5/5  Left quadriceps: 5/5  Right anterior tibial: 5/5  Left anterior tibial: 5/5  Right posterior tibial: 5/5  Left posterior tibial: 5/5  Right peroneal: 5/5  Left peroneal: 5/5    REFLEXES     Reflexes   Right brachioradialis: 2+  Left brachioradialis: 2+  Right biceps: 2+  Left biceps: 2+  Right patellar: 2+  Left patellar: 2+    SENSORY EXAM        Diminished light touch and vibration left hand   1st-3rd digit     Diminished light touch left foot from ankle to toes  Dorsal and plantar aspect     GAIT AND COORDINATION     Gait  Gait: (deferred)     Coordination   Finger to nose coordination: normal    Tremor   Resting tremor: absent    Significant Labs: Hemoglobin A1c: No results for input(s): HGBA1C in the last 720 hours.  Recent Lab Results  (Last 5 results in the past 24 hours)        07/13/22  1050   07/13/22  0830   07/13/22  0419   07/13/22  0318    07/13/22  0008        Albumin       2.8         Alkaline Phosphatase       165         Allens Test     N/A           ALT       51         Anion Gap       12   10       aPTT       32.9  Comment: aPTT therapeutic range = 39-69 seconds         AST       37         Baso #       0.05         Basophil %       0.4         BILIRUBIN TOTAL       0.9  Comment: For infants and newborns, interpretation of results should be based  on gestational age, weight and in agreement with clinical  observations.    Premature Infant recommended reference ranges:  Up to 24 hours.............<8.0 mg/dL  Up to 48 hours............<12.0 mg/dL  3-5 days..................<15.0 mg/dL  6-29 days.................<15.0 mg/dL           BNP       624  Comment: Values of less than 100 pg/ml are consistent with non-CHF populations.         Site     Other           BUN       11   11       Calcium       9.0   9.1       Chloride       89   94       CO2       31   29       Creatinine       1.1   1.1       CRP       45.2         Dels     Room Air           Differential Method       Automated         eGFR if        >60.0   >60.0       eGFR if non        >60.0  Comment: Calculation used to obtain the estimated glomerular filtration  rate (eGFR) is the CKD-EPI equation.      >60.0  Comment: Calculation used to obtain the estimated glomerular filtration  rate (eGFR) is the CKD-EPI equation.          Eos #       0.1         Eosinophil %       0.9         Fibrinogen       540         Glucose       163   194       Gran # (ANC)       9.0         Gran %       74.3         Hematocrit       22.8         Hemoglobin       7.1         Immature Grans (Abs)       0.05  Comment: Mild elevation in immature granulocytes is non specific and   can be seen in a variety of conditions including stress response,   acute inflammation, trauma and pregnancy. Correlation with other   laboratory and clinical findings is essential.           Immature  "Granulocytes       0.4         INR       2.5  Comment: Coumadin Therapy:  2.0 - 3.0 for INR for all indicators except mechanical heart valves  and antiphospholipid syndromes which should use 2.5 - 3.5.           LD       415  Comment: Results are increased in hemolyzed samples.         Lymph #       1.8         Lymph %       14.7         Magnesium 1.8       2.0         MCH       26.2         MCHC       31.1         MCV       84         Mono #       1.1         Mono %       9.3         MPV       9.1         nRBC       0         Platelets       445         POC BE     11           POC HCO3     34.7           POC PCO2     50.3           POC PH     7.447           POC PO2     23           POC SATURATED O2     41           POC TCO2     36           POCT Glucose   175             Potassium 4.0       3.2   3.6              3.2         PROTEIN TOTAL       7.5         Protime       24.7         RBC       2.71         RDW       17.3         Sample     VENOUS           Sodium       132   133       WBC       12.08                              Significant Imaging: I have reviewed all pertinent imaging results/findings within the past 24 hours.    Assessment and Plan:     Tingling in extremities  37 y.o. male with NICM s/p IABP, ICD and now LVAD placement 6/29. Neurology consulted 7/13 for 3 day hx of L hand and foot tingling. Pt report "fuzzy tingling" sensation to left hand digits 1-3 and L foot ankle to toes since cardiac procedure. Sensory deficits not improving or worsening. No weakness, visual disturbance or coordination issues. Unable to obtain MRI brain due to LVAD. Neuro exam unremarkable for focal deficits other than subjective sensory complaints. Presentation concerning for possible transient nerve compression from surgical positioning, suspect symptoms should get better with time.     Recommendations:  --brace for L hand/wrist given concern for carpal tunnel syndrome  If sensory deficits persist, consider outpatient " EMG/NCS   --continue correction of metabolic derangements per primary team  --PT/OT    VTE Risk Mitigation (From admission, onward)         Ordered     warfarin (COUMADIN) tablet 4 mg  Daily         07/12/22 0947     IP VTE HIGH RISK PATIENT  Once         06/30/22 1821     Place sequential compression device  Until discontinued         06/29/22 5271              Thank you for your consult. I will sign off. Please contact us if you have any additional questions.    Marcella Darden PA-C  General Neurology Consult

## 2022-07-13 NOTE — PROGRESS NOTES
Pt aaox3 while sitting up in chair with simeon Bundy asleep at bedside.  LVAD history interrogated with no abnormal events noted.  LVAD numbers WNL. Approximately 20 minutes was spent with the patient providing education. Patient states he is feeling more anxious, doesn't think he has been taking his buspar, will discuss with provider.    Patient educated on below information:   ALARMS: Reviewed all alarms with patient. Explained and demonstrated red heart, red battery, driveline disconnect, yellow wrench, and yellow kia alarms. Had patient trouble shoot different situations and provide the solutions to the alarms. Had patient explain what could be happening when the alarm sounds.   PAGING VS CALLING: Educated patient on Contact information sheet. Thoroughly explained the difference between Paging vs. Calling the coordinators. Patient practiced paging Coordinator.    Pt verbalized understanding, needs reinforcement.   Encouraged pt to notify nurse if they have any questions, problems or concerns for LVAD coordinator.  Pt verbalized understanding and in agreement of plan. Will follow up with pt soon.    PATIENT IS NOT CHECKED OFF ON ALARMS  CAREGIVER Page IS NOT CHECKED OFF ON DRESSING CHANGE

## 2022-07-13 NOTE — ASSESSMENT & PLAN NOTE
"37 y.o. male with NICM s/p IABP, ICD and now LVAD placement 6/29. Neurology consulted 7/13 for 3 day hx of L hand and foot tingling. Pt report "fuzzy tingling" sensation to left hand digits 1-3 and L foot ankle to toes since cardiac procedure. Sensory deficits not improving or worsening. No weakness, visual disturbance or coordination issues. Unable to obtain MRI brain due to LVAD. Neuro exam unremarkable for focal deficits other than subjective sensory complaints. Presentation concerning for possible transient nerve compression from surgical positioning, suspect symptoms should get better with time.     Recommendations:  --brace for L hand/wrist given concern for carpal tunnel syndrome  If sensory deficits persist, consider outpatient EMG/NCS   --continue correction of metabolic derangements per primary team  --PT/OT  "

## 2022-07-13 NOTE — SUBJECTIVE & OBJECTIVE
Interval History: IV Lasix gtt increased to 40 mg/hr overnight. Net -ve 3.2 L/24 hours and CVP 16. Remains on epi 0.03. SVO2 41%, CO 6.7, CI 2.9, . Pt reports tingling sensation in right fingers and toes, normal sensation and strength. No other focal neurologic deficits. General Neurology consulted, would appreciate their recommendations.       Continuous Infusions:   sodium chloride 0.9% 5 mL/hr at 06/27/22 0055    sodium chloride 0.9% Stopped (07/05/22 1314)    EPINEPHrine 0.03 mcg/kg/min (07/13/22 0900)    furosemide (LASIX) 2 mg/mL continuous infusion (non-titrating) 40 mg/hr (07/13/22 0900)     Scheduled Meds:   acetaminophen  650 mg Oral Q8H    famotidine  40 mg Oral Daily    ferrous gluconate  324 mg Oral Daily with breakfast    gabapentin  100 mg Oral TID    insulin aspart U-100  8 Units Subcutaneous TIDWM    insulin detemir U-100  18 Units Subcutaneous QHS    INV aspirin/placebo  100 mg Oral Daily    LIDOcaine  1 patch Transdermal Q24H    oxymetazoline  2 spray Each Nostril BID    polyethylene glycol  17 g Oral Daily    potassium bicarbonate  50 mEq Oral TID    sodium chloride 0.9%  10 mL Intravenous Q6H    tiZANidine  2 mg Oral Q8H    traZODone  50 mg Oral QHS    warfarin  4 mg Oral Daily     PRN Meds:albuterol sulfate, bisacodyL, dextrose 10%, dextrose 10%, glucagon (human recombinant), glucose, glucose, insulin aspart U-100, magnesium sulfate IVPB, oxyCODONE, potassium bicarbonate, potassium bicarbonate, potassium bicarbonate, sodium chloride 0.9%, sodium chloride 0.9%, Flushing PICC Protocol **AND** sodium chloride 0.9% **AND** sodium chloride 0.9%, traMADoL    Review of patient's allergies indicates:   Allergen Reactions    Aspirin Other (See Comments)     Mr. Thacker is enrolled in Dr. Paige's Alon Trial and cannot have any aspirin and/or aspirin-containing products. DO NOT cancel any orders for INV Aspirin 100 mg/Placebo. If you have questions, please contact Isabel @ [phone],  [phone],[email or web address], secure chat or MS Teams message.    Bumex [bumetanide] Hives    Lactose Diarrhea     Other reaction(s): Abdominal distension, gaseous    Torsemide Hives     Objective:     Vital Signs (Most Recent):  Temp: 98.8 °F (37.1 °C) (07/13/22 0715)  Pulse: 106 (07/13/22 0900)  Resp: 13 (07/13/22 0900)  BP: (!) 86/0 (07/13/22 0800)  SpO2: 98 % (07/13/22 0800)   Vital Signs (24h Range):  Temp:  [98.2 °F (36.8 °C)-98.8 °F (37.1 °C)] 98.8 °F (37.1 °C)  Pulse:  [] 106  Resp:  [9-36] 13  SpO2:  [96 %-98 %] 98 %  BP: (80-90)/(0) 86/0  Arterial Line BP: ()/(54-98) 88/79     Patient Vitals for the past 72 hrs (Last 3 readings):   Weight   07/12/22 1603 105 kg (231 lb 7.7 oz)   07/11/22 0600 105.3 kg (232 lb 2.3 oz)       Body mass index is 28.93 kg/m².      Intake/Output Summary (Last 24 hours) at 7/13/2022 1054  Last data filed at 7/13/2022 0930  Gross per 24 hour   Intake 2029.98 ml   Output 6575 ml   Net -4545.02 ml         Hemodynamic Parameters:       Telemetry: ST    Physical Exam  Vitals and nursing note reviewed.   Constitutional:       Appearance: Normal appearance.      Comments: Sitting in chair   HENT:      Head: Normocephalic and atraumatic.   Eyes:      Extraocular Movements: Extraocular movements intact.      Conjunctiva/sclera: Conjunctivae normal.   Neck:      Comments: JVP visible at level of ear sitting upright in chair  Cardiovascular:      Rate and Rhythm: Regular rhythm. Tachycardia present.      Comments: Smooth VAD hum  Pulmonary:      Breath sounds: Normal breath sounds.   Abdominal:      Palpations: Abdomen is soft.   Musculoskeletal:         General: No swelling.      Cervical back: Neck supple.      Right lower leg: No edema.      Left lower leg: No edema.   Skin:     General: Skin is dry.      Capillary Refill: Capillary refill takes 2 to 3 seconds.   Neurological:      General: No focal deficit present.      Mental Status: He is alert and oriented to  person, place, and time.      Motor: No weakness.   Psychiatric:         Mood and Affect: Mood normal.         Behavior: Behavior normal.         Thought Content: Thought content normal.         Judgment: Judgment normal.       Significant Labs:  CBC:  Recent Labs   Lab 07/11/22 0234 07/12/22 0309 07/13/22 0312   WBC 13.73* 13.64* 12.08   RBC 2.59* 2.64* 2.71*   HGB 7.0* 7.1* 7.1*   HCT 22.3* 22.9* 22.8*    433 445   MCV 86 87 84   MCH 27.0 26.9* 26.2*   MCHC 31.4* 31.0* 31.1*       BNP:  Recent Labs   Lab 07/08/22  0408 07/11/22 0234 07/13/22 0312   * 711* 624*       CMP:  Recent Labs   Lab 07/11/22 0234 07/11/22  1115 07/12/22  0309 07/12/22  0810 07/12/22 2119 07/13/22  0008 07/13/22 0312   *  --  186*  --   --  194* 163*   CALCIUM 9.2  --  8.9  --   --  9.1 9.0   ALBUMIN 2.9*  --  3.0*  --   --   --  2.8*   PROT 6.5  --  6.8  --   --   --  7.5   *  --  135*  --   --  133* 132*   K 3.9   < > 3.1*  2.9*   < > 3.3* 3.6 3.2*  3.2*   CO2 31*  --  29  --   --  29 31*   CL 94*  --  92*  --   --  94* 89*   BUN 23*  --  15  --   --  11 11   CREATININE 1.2  --  1.3  --   --  1.1 1.1   ALKPHOS 156*  --  161*  --   --   --  165*   ALT 73*  --  62*  --   --   --  51*   AST 41*  --  40  --   --   --  37   BILITOT 0.8  --  0.9  --   --   --  0.9    < > = values in this interval not displayed.        Coagulation:   Recent Labs   Lab 07/11/22 0234 07/12/22  0309 07/12/22  0810 07/13/22  0312   INR 2.2*  --  2.4* 2.5*   APTT 32.3* 33.1*  --  32.9*       LDH:  Recent Labs   Lab 07/11/22  0234 07/12/22  0309 07/13/22  0312   * 434* 415*       Microbiology:  Microbiology Results (last 7 days)       Procedure Component Value Units Date/Time    Blood culture [[id number]] Collected: 07/02/22 1629    Order Status: Completed Specimen: Blood from Peripheral, Hand, Right Updated: 07/07/22 1812     Blood Culture, Routine No growth after 5 days.    Blood culture [[id number]] Collected: 07/02/22 1618     Order Status: Completed Specimen: Blood from Peripheral, Antecubital, Right Updated: 07/07/22 1812     Blood Culture, Routine No growth after 5 days.            I have reviewed all pertinent labs within the past 24 hours.    Estimated Creatinine Clearance: 120.6 mL/min (based on SCr of 1.1 mg/dL).    Diagnostic Results:  I have reviewed and interpreted all pertinent imaging results/findings within the past 24 hours.

## 2022-07-13 NOTE — ASSESSMENT & PLAN NOTE
- NIDCM  - Was not evaluated for OHTx as he quit smoking in April 2022.   - Received HM3 on 6/29  - Hypervolemic on Lasix gtt, increased to 40 mg/hr overnight  - Net -ve 3.2 L/24 hours   - SVO2 41, CO 6.7, CI 2.8,  on epi 0.03. Midodrine discontinued 6/11  - ECHO 7/12- AV does not open, IVS midline, moderate-severe reduced RVSF, LVEDD 5.02 with HM3 speed set to 5200 rpm (decreased from 5300 rpm the day prior)   - See LVAD

## 2022-07-13 NOTE — PLAN OF CARE
Hemodynamics Care Update Note       SVO2: 52  CVP: 25  CO: 5.9  CI: 2.4  SVR: 679.2     MAP 58  hgb 7.1    (calculated using oxygen consumption constant of 3.5)       Plan:  Will give a bolus of IV lasix 80 mg and increase the rate of gtt to 40/hr   Case discussed with on call HTS attending.    Donn Baker, PGY4  Cardiovascular Disease  [facility]

## 2022-07-13 NOTE — PLAN OF CARE
"      SICU PLAN OF CARE NOTE    Dx: Acute on chronic combined systolic and diastolic congestive heart failure, NYHA class 4    Shift Events: Lasix gtt increased. IVP lasix administered. SvO2 reportd. CVPs high. UO monitored. NAEON.    Neuro: AAO x4, Follows Commands, and Moves All Extremities    Vital Signs: BP (!) 85/0 (BP Location: Left arm, Patient Position: Lying)   Pulse 105   Temp 98.5 °F (36.9 °C) (Oral)   Resp 14   Ht 6' 3" (1.905 m)   Wt 105 kg (231 lb 7.7 oz)   SpO2 98%   BMI 28.93 kg/m²     Respiratory: Room Air    Diet: Cardiac Diet with a 1500mL Fluid Restriction    Gtts: Epinephrine and Lasix    Urine Output: Voids Spontaneously 3400 cc/shift of clear yellow urine.    VAD HeartMate 3  Speed: 5200  Flows: 4.3 - 4.6  PIs: 2.8 - 3.7  Power: 3.8    Labs/Accuchecks:   - WBC: 12.08  - Hgb: 7.1  - Hct: 22.8  - Platelets: 445  - PT: 24.7  - INR: 2.5  - aPTT: 32.9  - Na: 132  - K: 3.2  - BUN: 11  - Creatinine: 1.1  - Glucose: 194  - Ca: 9.0  - Ma.0  - AST: 37  - ALT: 51       "

## 2022-07-14 LAB
ALBUMIN SERPL BCP-MCNC: 3 G/DL (ref 3.5–5.2)
ALLENS TEST: ABNORMAL
ALLENS TEST: ABNORMAL
ALP SERPL-CCNC: 162 U/L (ref 55–135)
ALT SERPL W/O P-5'-P-CCNC: 43 U/L (ref 10–44)
ANION GAP SERPL CALC-SCNC: 12 MMOL/L (ref 8–16)
APTT BLDCRRT: 32.2 SEC (ref 21–32)
AST SERPL-CCNC: 34 U/L (ref 10–40)
BASOPHILS # BLD AUTO: 0.03 K/UL (ref 0–0.2)
BASOPHILS NFR BLD: 0.3 % (ref 0–1.9)
BILIRUB SERPL-MCNC: 1 MG/DL (ref 0.1–1)
BNP SERPL-MCNC: 458 PG/ML (ref 0–99)
BUN SERPL-MCNC: 13 MG/DL (ref 6–20)
CALCIUM SERPL-MCNC: 9.6 MG/DL (ref 8.7–10.5)
CHLORIDE SERPL-SCNC: 89 MMOL/L (ref 95–110)
CO2 SERPL-SCNC: 31 MMOL/L (ref 23–29)
CREAT SERPL-MCNC: 1.4 MG/DL (ref 0.5–1.4)
CRP SERPL-MCNC: 45.7 MG/L (ref 0–8.2)
DELSYS: ABNORMAL
DELSYS: ABNORMAL
DIFFERENTIAL METHOD: ABNORMAL
EOSINOPHIL # BLD AUTO: 0.1 K/UL (ref 0–0.5)
EOSINOPHIL NFR BLD: 0.8 % (ref 0–8)
ERYTHROCYTE [DISTWIDTH] IN BLOOD BY AUTOMATED COUNT: 17.9 % (ref 11.5–14.5)
ERYTHROCYTE [SEDIMENTATION RATE] IN BLOOD BY WESTERGREN METHOD: 10 MM/H
EST. GFR  (AFRICAN AMERICAN): >60 ML/MIN/1.73 M^2
EST. GFR  (NON AFRICAN AMERICAN): >60 ML/MIN/1.73 M^2
FIBRINOGEN PPP-MCNC: 591 MG/DL (ref 182–400)
FIO2: 21
GLUCOSE SERPL-MCNC: 197 MG/DL (ref 70–110)
HCO3 UR-SCNC: 33.2 MMOL/L (ref 24–28)
HCO3 UR-SCNC: 33.3 MMOL/L (ref 24–28)
HCT VFR BLD AUTO: 22.7 % (ref 40–54)
HGB BLD-MCNC: 6.9 G/DL (ref 14–18)
IMM GRANULOCYTES # BLD AUTO: 0.04 K/UL (ref 0–0.04)
IMM GRANULOCYTES NFR BLD AUTO: 0.4 % (ref 0–0.5)
INR PPP: 2.3 (ref 0.8–1.2)
LDH SERPL L TO P-CCNC: 448 U/L (ref 110–260)
LYMPHOCYTES # BLD AUTO: 1.5 K/UL (ref 1–4.8)
LYMPHOCYTES NFR BLD: 14.1 % (ref 18–48)
MAGNESIUM SERPL-MCNC: 1.9 MG/DL (ref 1.6–2.6)
MAGNESIUM SERPL-MCNC: 2.1 MG/DL (ref 1.6–2.6)
MCH RBC QN AUTO: 25.8 PG (ref 27–31)
MCHC RBC AUTO-ENTMCNC: 30.4 G/DL (ref 32–36)
MCV RBC AUTO: 85 FL (ref 82–98)
MODE: ABNORMAL
MONOCYTES # BLD AUTO: 1 K/UL (ref 0.3–1)
MONOCYTES NFR BLD: 9.3 % (ref 4–15)
NEUTROPHILS # BLD AUTO: 8.2 K/UL (ref 1.8–7.7)
NEUTROPHILS NFR BLD: 75.1 % (ref 38–73)
NRBC BLD-RTO: 0 /100 WBC
PCO2 BLDA: 45.3 MMHG (ref 35–45)
PCO2 BLDA: 47.2 MMHG (ref 35–45)
PH SMN: 7.46 [PH] (ref 7.35–7.45)
PH SMN: 7.47 [PH] (ref 7.35–7.45)
PLATELET # BLD AUTO: 479 K/UL (ref 150–450)
PMV BLD AUTO: 9.7 FL (ref 9.2–12.9)
PO2 BLDA: 26 MMHG (ref 40–60)
PO2 BLDA: 28 MMHG (ref 40–60)
POC BE: 10 MMOL/L
POC BE: 9 MMOL/L
POC SATURATED O2: 51 % (ref 95–100)
POC SATURATED O2: 56 % (ref 95–100)
POC SVO2: 51 %
POC TCO2: 35 MMOL/L (ref 24–29)
POC TCO2: 35 MMOL/L (ref 24–29)
POCT GLUCOSE: 140 MG/DL (ref 70–110)
POCT GLUCOSE: 171 MG/DL (ref 70–110)
POCT GLUCOSE: 184 MG/DL (ref 70–110)
POTASSIUM SERPL-SCNC: 3.5 MMOL/L (ref 3.5–5.1)
POTASSIUM SERPL-SCNC: 3.6 MMOL/L (ref 3.5–5.1)
POTASSIUM SERPL-SCNC: 3.9 MMOL/L (ref 3.5–5.1)
POTASSIUM SERPL-SCNC: 4.7 MMOL/L (ref 3.5–5.1)
PREALB SERPL-MCNC: 12 MG/DL (ref 20–43)
PROT SERPL-MCNC: 7.7 G/DL (ref 6–8.4)
PROTHROMBIN TIME: 22.9 SEC (ref 9–12.5)
RBC # BLD AUTO: 2.67 M/UL (ref 4.6–6.2)
SAMPLE: ABNORMAL
SAMPLE: ABNORMAL
SITE: ABNORMAL
SITE: ABNORMAL
SODIUM SERPL-SCNC: 132 MMOL/L (ref 136–145)
WBC # BLD AUTO: 10.91 K/UL (ref 3.9–12.7)

## 2022-07-14 PROCEDURE — 99291 PR CRITICAL CARE, E/M 30-74 MINUTES: ICD-10-PCS | Mod: FS,,, | Performed by: INTERNAL MEDICINE

## 2022-07-14 PROCEDURE — 99900035 HC TECH TIME PER 15 MIN (STAT)

## 2022-07-14 PROCEDURE — 99233 SBSQ HOSP IP/OBS HIGH 50: CPT | Mod: ,,, | Performed by: INTERNAL MEDICINE

## 2022-07-14 PROCEDURE — 83735 ASSAY OF MAGNESIUM: CPT | Performed by: PHYSICIAN ASSISTANT

## 2022-07-14 PROCEDURE — 99232 PR SUBSEQUENT HOSPITAL CARE,LEVL II: ICD-10-PCS | Mod: ,,, | Performed by: PSYCHIATRY & NEUROLOGY

## 2022-07-14 PROCEDURE — 25000003 PHARM REV CODE 250: Performed by: INTERNAL MEDICINE

## 2022-07-14 PROCEDURE — 83615 LACTATE (LD) (LDH) ENZYME: CPT | Performed by: THORACIC SURGERY (CARDIOTHORACIC VASCULAR SURGERY)

## 2022-07-14 PROCEDURE — 25000003 PHARM REV CODE 250: Performed by: STUDENT IN AN ORGANIZED HEALTH CARE EDUCATION/TRAINING PROGRAM

## 2022-07-14 PROCEDURE — 36620 INSERTION CATHETER ARTERY: CPT

## 2022-07-14 PROCEDURE — 84132 ASSAY OF SERUM POTASSIUM: CPT | Mod: 91

## 2022-07-14 PROCEDURE — 82803 BLOOD GASES ANY COMBINATION: CPT

## 2022-07-14 PROCEDURE — 99291 CRITICAL CARE FIRST HOUR: CPT | Mod: FS,,, | Performed by: INTERNAL MEDICINE

## 2022-07-14 PROCEDURE — 63600175 PHARM REV CODE 636 W HCPCS

## 2022-07-14 PROCEDURE — 20000000 HC ICU ROOM

## 2022-07-14 PROCEDURE — 99232 SBSQ HOSP IP/OBS MODERATE 35: CPT | Mod: ,,, | Performed by: PSYCHIATRY & NEUROLOGY

## 2022-07-14 PROCEDURE — 25000003 PHARM REV CODE 250

## 2022-07-14 PROCEDURE — 93750 INTERROGATION VAD IN PERSON: CPT | Mod: ,,, | Performed by: INTERNAL MEDICINE

## 2022-07-14 PROCEDURE — 63600175 PHARM REV CODE 636 W HCPCS: Performed by: STUDENT IN AN ORGANIZED HEALTH CARE EDUCATION/TRAINING PROGRAM

## 2022-07-14 PROCEDURE — 84134 ASSAY OF PREALBUMIN: CPT | Performed by: STUDENT IN AN ORGANIZED HEALTH CARE EDUCATION/TRAINING PROGRAM

## 2022-07-14 PROCEDURE — 80053 COMPREHEN METABOLIC PANEL: CPT

## 2022-07-14 PROCEDURE — 25000003 PHARM REV CODE 250: Performed by: PHYSICIAN ASSISTANT

## 2022-07-14 PROCEDURE — 83880 ASSAY OF NATRIURETIC PEPTIDE: CPT | Performed by: STUDENT IN AN ORGANIZED HEALTH CARE EDUCATION/TRAINING PROGRAM

## 2022-07-14 PROCEDURE — 85384 FIBRINOGEN ACTIVITY: CPT | Performed by: STUDENT IN AN ORGANIZED HEALTH CARE EDUCATION/TRAINING PROGRAM

## 2022-07-14 PROCEDURE — 94761 N-INVAS EAR/PLS OXIMETRY MLT: CPT

## 2022-07-14 PROCEDURE — 25000003 PHARM REV CODE 250: Performed by: THORACIC SURGERY (CARDIOTHORACIC VASCULAR SURGERY)

## 2022-07-14 PROCEDURE — 99233 PR SUBSEQUENT HOSPITAL CARE,LEVL III: ICD-10-PCS | Mod: ,,, | Performed by: INTERNAL MEDICINE

## 2022-07-14 PROCEDURE — 94799 UNLISTED PULMONARY SVC/PX: CPT

## 2022-07-14 PROCEDURE — A4216 STERILE WATER/SALINE, 10 ML: HCPCS | Performed by: THORACIC SURGERY (CARDIOTHORACIC VASCULAR SURGERY)

## 2022-07-14 PROCEDURE — 25000003 PHARM REV CODE 250: Performed by: ANESTHESIOLOGY

## 2022-07-14 PROCEDURE — 85025 COMPLETE CBC W/AUTO DIFF WBC: CPT | Performed by: INTERNAL MEDICINE

## 2022-07-14 PROCEDURE — 27000248 HC VAD-ADDITIONAL DAY

## 2022-07-14 PROCEDURE — 83735 ASSAY OF MAGNESIUM: CPT | Mod: 91

## 2022-07-14 PROCEDURE — 85610 PROTHROMBIN TIME: CPT | Performed by: PHYSICIAN ASSISTANT

## 2022-07-14 PROCEDURE — 86140 C-REACTIVE PROTEIN: CPT | Performed by: PHYSICIAN ASSISTANT

## 2022-07-14 PROCEDURE — 93750 PR INTERROGATE VENT ASSIST DEV, IN PERSON, W PHYSICIAN ANALYSIS: ICD-10-PCS | Mod: ,,, | Performed by: INTERNAL MEDICINE

## 2022-07-14 PROCEDURE — 85730 THROMBOPLASTIN TIME PARTIAL: CPT | Performed by: STUDENT IN AN ORGANIZED HEALTH CARE EDUCATION/TRAINING PROGRAM

## 2022-07-14 RX ORDER — WARFARIN SODIUM 5 MG/1
5 TABLET ORAL DAILY
Status: DISCONTINUED | OUTPATIENT
Start: 2022-07-14 | End: 2022-07-15

## 2022-07-14 RX ORDER — POLYETHYLENE GLYCOL 3350 17 G/17G
17 POWDER, FOR SOLUTION ORAL
Status: DISCONTINUED | OUTPATIENT
Start: 2022-07-14 | End: 2022-07-28 | Stop reason: HOSPADM

## 2022-07-14 RX ADMIN — POTASSIUM BICARBONATE 50 MEQ: 978 TABLET, EFFERVESCENT ORAL at 06:07

## 2022-07-14 RX ADMIN — ACETAMINOPHEN 650 MG: 325 TABLET ORAL at 01:07

## 2022-07-14 RX ADMIN — MAGNESIUM SULFATE HEPTAHYDRATE 2 G: 40 INJECTION, SOLUTION INTRAVENOUS at 06:07

## 2022-07-14 RX ADMIN — BUSPIRONE HYDROCHLORIDE 7.5 MG: 5 TABLET ORAL at 06:07

## 2022-07-14 RX ADMIN — ACETAMINOPHEN 650 MG: 325 TABLET ORAL at 09:07

## 2022-07-14 RX ADMIN — INSULIN ASPART 1 UNITS: 100 INJECTION, SOLUTION INTRAVENOUS; SUBCUTANEOUS at 09:07

## 2022-07-14 RX ADMIN — INSULIN ASPART 10 UNITS: 100 INJECTION, SOLUTION INTRAVENOUS; SUBCUTANEOUS at 08:07

## 2022-07-14 RX ADMIN — GABAPENTIN 100 MG: 100 CAPSULE ORAL at 08:07

## 2022-07-14 RX ADMIN — POTASSIUM BICARBONATE 60 MEQ: 391 TABLET, EFFERVESCENT ORAL at 12:07

## 2022-07-14 RX ADMIN — EPINEPHRINE 0.03 MCG/KG/MIN: 1 INJECTION INTRAMUSCULAR; INTRAVENOUS; SUBCUTANEOUS at 06:07

## 2022-07-14 RX ADMIN — OXYCODONE HYDROCHLORIDE 10 MG: 10 TABLET ORAL at 02:07

## 2022-07-14 RX ADMIN — Medication 10 ML: at 12:07

## 2022-07-14 RX ADMIN — TIZANIDINE 2 MG: 2 TABLET ORAL at 01:07

## 2022-07-14 RX ADMIN — Medication 10 ML: at 06:07

## 2022-07-14 RX ADMIN — TIZANIDINE 2 MG: 2 TABLET ORAL at 09:07

## 2022-07-14 RX ADMIN — GABAPENTIN 100 MG: 100 CAPSULE ORAL at 03:07

## 2022-07-14 RX ADMIN — INSULIN ASPART 10 UNITS: 100 INJECTION, SOLUTION INTRAVENOUS; SUBCUTANEOUS at 11:07

## 2022-07-14 RX ADMIN — DOCUSATE SODIUM 50 MG: 50 CAPSULE, LIQUID FILLED ORAL at 08:07

## 2022-07-14 RX ADMIN — OXYCODONE HYDROCHLORIDE 10 MG: 10 TABLET ORAL at 08:07

## 2022-07-14 RX ADMIN — ACETAMINOPHEN 650 MG: 325 TABLET ORAL at 06:07

## 2022-07-14 RX ADMIN — FUROSEMIDE 40 MG/HR: 10 INJECTION, SOLUTION INTRAMUSCULAR; INTRAVENOUS at 03:07

## 2022-07-14 RX ADMIN — TIZANIDINE 2 MG: 2 TABLET ORAL at 06:07

## 2022-07-14 RX ADMIN — POLYETHYLENE GLYCOL 3350 17 G: 17 POWDER, FOR SOLUTION ORAL at 08:07

## 2022-07-14 RX ADMIN — POTASSIUM BICARBONATE 50 MEQ: 978 TABLET, EFFERVESCENT ORAL at 09:07

## 2022-07-14 RX ADMIN — TRAMADOL HYDROCHLORIDE 50 MG: 50 TABLET, COATED ORAL at 12:07

## 2022-07-14 RX ADMIN — TRAZODONE HYDROCHLORIDE 50 MG: 50 TABLET ORAL at 09:07

## 2022-07-14 RX ADMIN — WARFARIN SODIUM 5 MG: 5 TABLET ORAL at 04:07

## 2022-07-14 RX ADMIN — FUROSEMIDE 40 MG/HR: 10 INJECTION, SOLUTION INTRAMUSCULAR; INTRAVENOUS at 07:07

## 2022-07-14 RX ADMIN — POTASSIUM BICARBONATE 50 MEQ: 978 TABLET, EFFERVESCENT ORAL at 03:07

## 2022-07-14 RX ADMIN — FAMOTIDINE 40 MG: 20 TABLET ORAL at 08:07

## 2022-07-14 RX ADMIN — FUROSEMIDE 40 MG/HR: 10 INJECTION, SOLUTION INTRAMUSCULAR; INTRAVENOUS at 08:07

## 2022-07-14 RX ADMIN — INSULIN ASPART 10 UNITS: 100 INJECTION, SOLUTION INTRAVENOUS; SUBCUTANEOUS at 04:07

## 2022-07-14 RX ADMIN — Medication 324 MG: at 08:07

## 2022-07-14 RX ADMIN — INSULIN ASPART 2 UNITS: 100 INJECTION, SOLUTION INTRAVENOUS; SUBCUTANEOUS at 08:07

## 2022-07-14 NOTE — ASSESSMENT & PLAN NOTE
- S/P DT HM3 with MVR plus Protek Duo for RV support 6/29 (Grecia)   - RVAD removed with concern for hemolysis  - Marcella increased to 20 (now off),  off, remains on epi 0.03  - IV Lasix gtt increased to 40 mg/hr overnight with improved volume status - see ADHF   - HTS primary   - AC GASTON trial   - LDH stable  - Speed set at 5200 (increased to 5300 on 7/6 then decreased to 5200 rpm 7/11)  - ECHO 7/12: IVS midline, AV does not open, moderate-severe reduced RVSF, LVEDD 5.02 with HM3 speed set to 5200 rpm (decreased the day prior)   Procedure: Device Interrogation Including analysis of device parameters  Current Settings: Ventricular Assist Device  Review of device function is stable    TXP LVAD INTERROGATIONS 7/14/2022 7/14/2022 7/14/2022 7/14/2022 7/14/2022 7/14/2022 7/14/2022   Type HeartMate3 HeartMate3 HeartMate3 HeartMate3 HeartMate3 HeartMate3 HeartMate3   Flow 4.3 4.5 4.5 4.3 4.4 4.6 4.8   Speed 5200 5200 5200 5200 5200 5200 5200   PI 3.2 3.4 3.5 3.3 3.4 3.3 3.2   Power (Serna) 3.8 3.7 3.7 3.7 3.8 3.7 3.7   LSL 4800 4800 4800 4800 4800 4800 4800   Pulsatility Intermittent pulse Intermittent pulse Intermittent pulse Intermittent pulse Intermittent pulse Intermittent pulse Intermittent pulse

## 2022-07-14 NOTE — PROGRESS NOTES
07/14/2022  Miracle Caballero    Current provider:  Josh Pulido MD    Device interrogation:  TXP LVAD INTERROGATIONS 7/14/2022 7/14/2022 7/14/2022 7/14/2022 7/14/2022 7/14/2022 7/14/2022   Type HeartMate3 HeartMate3 HeartMate3 HeartMate3 HeartMate3 HeartMate3 HeartMate3   Flow 4.5 4.3 4.4 4.6 4.8 4.1 4.6   Speed 5200 5200 5200 5200 5200 5200 5200   PI 3.5 3.3 3.4 3.3 3.2 3.1 3.1   Power (Serna) 3.7 3.7 3.8 3.7 3.7 3.7 3.7   LSL 4800 4800 4800 4800 4800 4800 4800   Pulsatility Intermittent pulse Intermittent pulse Intermittent pulse Intermittent pulse Intermittent pulse Intermittent pulse Pulse          Rounded on Kevan Queen to ensure all mechanical assist device settings (IABP or VAD) were appropriate and all parameters were within limits.  I was able to ensure all back up equipment was present, the staff had no issues, and the Perfusion Department daily rounding was complete.      For implantable VADs: Interrogation of Ventricular assist device was performed with analysis of device parameters and review of device function. I have personally reviewed the interrogation findings and agree with findings as stated.     In emergency, the nursing units have been notified to contact the perfusion department either by:  Calling [phone] from 630am to 4pm Mon thru Fri, utilizing the On-Call Finder functionality of Epic and searching for Perfusion, or by contacting the hospital  from 4pm to 630am and on weekends and asking to speak with the perfusionist on call.    7:14 AM

## 2022-07-14 NOTE — ASSESSMENT & PLAN NOTE
- Blood cultures 6/20 and repeat 6/21 positive for Staph Epi. One of two blood cultures from 6/23 positive for Staph (taken prior to line exchange). Repeat cultures sent 6/25 NGTD  - IJ exchanged on 6/23, IABP exchanged 6/23  - ID recommended 14 day course of vancomycin from VAD implantation on 6/29 with end date: 7/12/22. Vancomycin discontinued per CTS with concerns for elevated sCr. ID with new recs to complete 7d course of daptomycin, which was completed 7/7/22

## 2022-07-14 NOTE — ASSESSMENT & PLAN NOTE
- NIDCM  - Was not evaluated for OHTx as he quit smoking in April 2022.   - Received HM3 on 6/29  - IV Lasix gtt 40 mg/hr   - Net -ve 4.3 L/24 hours  - SVO2 56, CO 8.9, CI 3.8,  on epi 0.03. Midodrine discontinued 6/11  - ECHO 7/12- AV does not open, IVS midline, moderate-severe reduced RVSF, LVEDD 5.02 with HM3 speed set to 5200 rpm (decreased from 5300 rpm the day prior)   - See LVAD

## 2022-07-14 NOTE — PROGRESS NOTES
"[facility] - Surgical Intensive Care  Adult Nutrition  Progress Note    SUMMARY       Recommendations    1) Continue cardiac diet + fluid restriction per physician. RD added consistent carbohydrate restriction to aid in blood glucose control.     2) RD to monitor and f/u.    Goals: Meet % EEN/EPN by RD f/u  Nutrition Goal Status: goal met  Communication of RD Recs:  (POC)    Assessment and Plan  Nutrition Problem  Increased protein needs      Related to (etiology):   Physiological values      Signs and Symptoms (as evidenced by):   LVAD w/u     Interventions (treatment strategy):  Collaboration of nutrition care w/ other providers      Nutrition Diagnosis Status:   Continues    Reason for Assessment    Reason For Assessment: RD follow-up  Diagnosis: cardiac disease  Relevant Medical History: CHF, DM, cardiomyopathy  Interdisciplinary Rounds: did not attend    General Information Comments: Pt seen at bedside; pt reports good appetite, eating >50% of meals on most occasions, 100% if he likes the food. Nursing documentation confirms pt eating 50% of meals. No complaints of N/V/D, abdominal pain, or chewing/swallowing difficulties. Wt down 2/2 shifts in fluid. NFPE - no wasting observed; no s/s of malnutrition at this time. Pt had no questions/concerns regarding cardiac/diabetic diet.    Nutrition Discharge Planning: pending medical course    Nutrition Risk Screen    Nutrition Risk Screen: no indicators present    Nutrition/Diet History    Spiritual, Cultural Beliefs, Mormonism Practices, Values that Affect Care: no  Food Allergies:  (lactose)    Anthropometrics    Temp: 98.6 °F (37 °C)  Height Method: Stated  Height: 6' 3" (190.5 cm)  Height (inches): 75 in  Weight Method: Bed Scale  Weight: 90 kg (198 lb 6.6 oz)  Weight (lb): 198.42 lb  Ideal Body Weight (IBW), Male: 196 lb  % Ideal Body Weight, Male (lb): 118.11 %  BMI (Calculated): 24.8  BMI Grade: 25 - 29.9 - overweight    Lab/Procedures/Meds    Pertinent Labs " Reviewed: reviewed  Pertinent Labs Comments: Na 132, Glu 197, Alb 3, CRP 45.7  Pertinent Medications Reviewed: reviewed  Pertinent Medications Comments: furosemide, epinephrine, warfarin, NaCl    Estimated/Assessed Needs    Weight Used For Calorie Calculations: 97.5 kg (214 lb 15.2 oz)  Energy Calorie Requirements (kcal): 2183  Energy Need Method: [place]-St Jeor (PAL:1.1)  Protein Requirements: 117-125g (1.2-1.3g/kg)  Weight Used For Protein Calculations: 97.5 kg (214 lb 15.2 oz)  Fluid Requirements (mL): 1mL/kcal or fluid per MD  RDA Method (mL): 2183  CHO Requirement: 272    Nutrition Prescription Ordered    Current Diet Order: Cardiac diet + 1500mL fluid restriction    Evaluation of Received Nutrient/Fluid Intake    I/O: -27.9L since 6/30  Energy Calories Required: meeting needs  Protein Required: meeting needs  Fluid Required: meeting needs  Total Fluid Intake (mL/kg): 1mL/kcal or fluid per MD  Comments: LBM: 7/11  Tolerance: tolerating  % Intake of Estimated Energy Needs: 75 - 100 %  % Meal Intake: 75 - 100 %    Nutrition Risk    Level of Risk/Frequency of Follow-up:  (1x/week)     Monitor and Evaluation    Food and Nutrient Intake: energy intake, food and beverage intake  Food and Nutrient Adminstration: diet order  Knowledge/Beliefs/Attitudes: food and nutrition knowledge/skill, beliefs and attitudes  Physical Activity and Function: nutrition-related ADLs and IADLs, factors affecting access to physical activity  Anthropometric Measurements: growth pattern indices/percentile ranks, body mass index, weight change, weight, height/length  Biochemical Data, Medical Tests and Procedures: lipid profile, inflammatory profile, glucose/endocrine profile, gastrointestinal profile, electrolyte and renal panel  Nutrition-Focused Physical Findings: skin, head and eyes, extremities, muscles and bones, overall appearance     Nutrition Follow-Up    RD Follow-up?: Yes

## 2022-07-14 NOTE — CARE UPDATE
BG goal 140-180    -A1C   Lab Results   Component Value Date    HGBA1C 9.2 (H) 05/20/2022         -HOME REGIMEN: Detemir  23  units daily and Aspart   12  units TIDWM and  Mod dose correction insulin    -INPATIENT REGIMEN: levemir 18 units daily and novolog 8 units with meals.     -GLUCOSE TREND FOR THE PAST 24HRS: 118-184    -NO HYPOGYCEMIAS NOTED     -TOLERATING 50% OF PO DIET     -Diet: Diet Cardiac Fluid - 1500mL        Remains in SICU, NAEON. Creatinine 1.4. BG reasonably well controlled with current regimen. LVAD. 14 Days Post-Op      Plan:  continue levemir 18 units daily.   continue novolog 10 units with meals.   BG monitoring ac/hs and moderate dose correction scale.     Discharge planning:tbd     Endocrine to continue to follow    ** Please call Endocrine for any BG related issues **

## 2022-07-14 NOTE — PROGRESS NOTES
[facility] - [facility] Intensive Care  Heart Transplant  Progress Note    Patient Name: Kevan Queen  MRN: [id number]  Admission Date: 6/13/2022  Hospital Length of Stay: 31 days  Attending Physician: Josh Pulido MD  Primary Care Provider: ORALIA Cline  Principal Problem:Acute on chronic combined systolic and diastolic congestive heart failure, NYHA class 4    Subjective:     Interval History: NAEON. No complaints. Diuresing well with IV Lasix gtt at 40 mg/hr. Net -ve 4.3 L/24 hours and CVP 14. SVO2 56%, CO 5.9, CI 3.8,  on epi 0.03.     Continuous Infusions:   sodium chloride 0.9% 5 mL/hr at 06/27/22 0055    sodium chloride 0.9% Stopped (07/05/22 1314)    EPINEPHrine 0.03 mcg/kg/min (07/14/22 0900)    furosemide (LASIX) 2 mg/mL continuous infusion (non-titrating) 40 mg/hr (07/14/22 0900)     Scheduled Meds:   acetaminophen  650 mg Oral Q8H    busPIRone  7.5 mg Oral Daily    famotidine  40 mg Oral Daily    ferrous gluconate  324 mg Oral Daily with breakfast    gabapentin  100 mg Oral TID    insulin aspart U-100  10 Units Subcutaneous TIDWM    insulin detemir U-100  18 Units Subcutaneous QHS    INV aspirin/placebo  100 mg Oral Daily    LIDOcaine  1 patch Transdermal Q24H    polyethylene glycol  17 g Oral Daily    potassium bicarbonate  50 mEq Oral TID    sodium chloride 0.9%  10 mL Intravenous Q6H    tiZANidine  2 mg Oral Q8H    traZODone  50 mg Oral QHS    warfarin  4 mg Oral Daily     PRN Meds:albuterol sulfate, bisacodyL, dextrose 10%, dextrose 10%, glucagon (human recombinant), glucose, glucose, insulin aspart U-100, magnesium sulfate IVPB, oxyCODONE, potassium bicarbonate, potassium bicarbonate, potassium bicarbonate, sodium chloride 0.9%, sodium chloride 0.9%, Flushing PICC Protocol **AND** sodium chloride 0.9% **AND** sodium chloride 0.9%, traMADoL    Review of patient's allergies indicates:   Allergen Reactions    Aspirin Other (See Comments)     Mr. Thacker is  enrolled in Dr. Paige's [place] Trial and cannot have any aspirin and/or aspirin-containing products. DO NOT cancel any orders for INV Aspirin 100 mg/Placebo. If you have questions, please contact Isabel @ [phone], [phone],[email or web address], secure chat or MS Teams message.    Bumex [bumetanide] Hives    Lactose Diarrhea     Other reaction(s): Abdominal distension, gaseous    Torsemide Hives     Objective:     Vital Signs (Most Recent):  Temp: 98.8 °F (37.1 °C) (07/14/22 0700)  Pulse: 96 (07/14/22 1030)  Resp: 10 (07/14/22 1030)  BP: (!) 84/0 (07/13/22 1515)  SpO2: 99 % (07/14/22 0700)   Vital Signs (24h Range):  Temp:  [98.4 °F (36.9 °C)-99 °F (37.2 °C)] 98.8 °F (37.1 °C)  Pulse:  [] 96  Resp:  [10-40] 10  SpO2:  [98 %-100 %] 99 %  BP: (84)/(0) 84/0  Arterial Line BP: (75-96)/(62-87) 78/66     Patient Vitals for the past 72 hrs (Last 3 readings):   Weight   07/14/22 0500 90 kg (198 lb 6.6 oz)   07/12/22 1603 105 kg (231 lb 7.7 oz)       Body mass index is 24.8 kg/m².      Intake/Output Summary (Last 24 hours) at 7/14/2022 1122  Last data filed at 7/14/2022 0900  Gross per 24 hour   Intake 1365.74 ml   Output 5475 ml   Net -4109.26 ml         Hemodynamic Parameters:       Telemetry: ST    Physical Exam  Vitals and nursing note reviewed.   Constitutional:       Appearance: Normal appearance.      Comments: Sitting in chair   HENT:      Head: Normocephalic and atraumatic.   Eyes:      Extraocular Movements: Extraocular movements intact.      Conjunctiva/sclera: Conjunctivae normal.   Cardiovascular:      Rate and Rhythm: Regular rhythm. Tachycardia present.      Comments: Smooth VAD hum  Pulmonary:      Breath sounds: Normal breath sounds.   Abdominal:      Palpations: Abdomen is soft.   Musculoskeletal:         General: No swelling.      Cervical back: Neck supple.      Right lower leg: No edema.      Left lower leg: No edema.   Skin:     General: Skin is dry.      Capillary Refill: Capillary refill  takes 2 to 3 seconds.   Neurological:      General: No focal deficit present.      Mental Status: He is alert and oriented to person, place, and time.      Motor: No weakness.   Psychiatric:         Mood and Affect: Mood normal.         Behavior: Behavior normal.         Thought Content: Thought content normal.         Judgment: Judgment normal.       Significant Labs:  CBC:  Recent Labs   Lab 07/12/22  0309 07/13/22 0312 07/14/22  0333   WBC 13.64* 12.08 10.91   RBC 2.64* 2.71* 2.67*   HGB 7.1* 7.1* 6.9*   HCT 22.9* 22.8* 22.7*    445 479*   MCV 87 84 85   MCH 26.9* 26.2* 25.8*   MCHC 31.0* 31.1* 30.4*       BNP:  Recent Labs   Lab 07/11/22  0234 07/13/22 0312 07/14/22  0333   * 624* 458*       CMP:  Recent Labs   Lab 07/12/22  0309 07/12/22  0810 07/13/22  0008 07/13/22  0312 07/13/22  1050 07/13/22  1615 07/13/22  2100 07/14/22  0335   *  --  194* 163*  --   --   --  197*   CALCIUM 8.9  --  9.1 9.0  --   --   --  9.6   ALBUMIN 3.0*  --   --  2.8*  --   --   --  3.0*   PROT 6.8  --   --  7.5  --   --   --  7.7   *  --  133* 132*  --   --   --  132*   K 3.1*  2.9*   < > 3.6 3.2*  3.2*   < > 4.0 3.2* 3.6   CO2 29  --  29 31*  --   --   --  31*   CL 92*  --  94* 89*  --   --   --  89*   BUN 15  --  11 11  --   --   --  13   CREATININE 1.3  --  1.1 1.1  --   --   --  1.4   ALKPHOS 161*  --   --  165*  --   --   --  162*   ALT 62*  --   --  51*  --   --   --  43   AST 40  --   --  37  --   --   --  34   BILITOT 0.9  --   --  0.9  --   --   --  1.0    < > = values in this interval not displayed.        Coagulation:   Recent Labs   Lab 07/12/22 0309 07/12/22 0810 07/13/22 0312 07/14/22 0333   INR  --  2.4* 2.5* 2.3*   APTT 33.1*  --  32.9* 32.2*       LDH:  Recent Labs   Lab 07/12/22 0309 07/13/22 0312 07/14/22 0333   * 415* 448*       Microbiology:  Microbiology Results (last 7 days)       Procedure Component Value Units Date/Time    Blood culture [[id number]] Collected:  "07/02/22 1629    Order Status: Completed Specimen: Blood from Peripheral, Hand, Right Updated: 07/07/22 1812     Blood Culture, Routine No growth after 5 days.    Blood culture [[id number]] Collected: 07/02/22 1618    Order Status: Completed Specimen: Blood from Peripheral, Antecubital, Right Updated: 07/07/22 1812     Blood Culture, Routine No growth after 5 days.            I have reviewed all pertinent labs within the past 24 hours.    Estimated Creatinine Clearance: 86.3 mL/min (based on SCr of 1.4 mg/dL).    Diagnostic Results:  I have reviewed and interpreted all pertinent imaging results/findings within the past 24 hours.    Assessment and Plan:     38 yo male with NIDCM with BiV systolic heart failure, on home Milrinone at 0.375 mcg/kg/min, [facility] 4/2/22 ,was not evaluated for OHT as he has recently quit smoking in April 2022 but was approved for VAD with plan to begin OHT evaluation in upcoming months if Mr Queen is stable and suitable for OHT eval (blood group A), 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 Went to ED at [facility] 6/10 but left after waiting 4 hours. Had clinic appointment with us today, but arrived to [facility] early this morning and decided to go to the ED instead. Baseline Lasix dose is 80 mg bid. Reports taking 240 mg qd past 3 days with no improvement. BNP is 1701, up from 898 on 6/2 and 49 on 5/24. sCr is 1.8 with baseline ~ 1.5-1.7. sPO2 on RA is 93%. Wife at bedside    He has been given Lasix 80 mg IVP in the ED with plan to start Lasix gtt at 20 mg/hr           * Acute on chronic combined systolic and diastolic congestive heart failure, NYHA class " 4  - NIDCM  - Was not evaluated for OHTx as he quit smoking in April 2022.   - Received HM3 on 6/29  - IV Lasix gtt 40 mg/hr   - Net -ve 4.3 L/24 hours  - SVO2 56, CO 8.9, CI 3.8,  on epi 0.03. Midodrine discontinued 6/11  - ECHO 7/12- AV does not open, IVS midline, moderate-severe reduced RVSF, LVEDD 5.02 with HM3 speed set to 5200 rpm (decreased from 5300 rpm the day prior)   - See LVAD    Tingling in extremities  - Pt reports paresthesias in right toes and fingers x 3 days   - Possible radiculopathy vs electrolyte abnormality vs neurovascular etiology   - Consulted General Neurology, suspect compression from surgical positioning/carpal tunnel syndrome. Recommended brace to L hand/wrist. If no improvement, will need outpatient EMG/NCS    Hyponatremia  -Hypervolemic, now improved  -Discontinued salt tablets   -Improvement in Na with diuresis    LVAD (left ventricular assist device) present  - S/P DT HM3 with MVR plus Protek Duo for RV support 6/29 (Grecia)   - RVAD removed with concern for hemolysis  - Marcella increased to 20 (now off),  off, remains on epi 0.03  - IV Lasix gtt increased to 40 mg/hr overnight with improved volume status - see ADHF   - HTS primary   - AC GASTON trial   - LDH stable  - Speed set at 5200 (increased to 5300 on 7/6 then decreased to 5200 rpm 7/11)  - ECHO 7/12: IVS midline, AV does not open, moderate-severe reduced RVSF, LVEDD 5.02 with HM3 speed set to 5200 rpm (decreased the day prior)   Procedure: Device Interrogation Including analysis of device parameters  Current Settings: Ventricular Assist Device  Review of device function is stable    TXP LVAD INTERROGATIONS 7/14/2022 7/14/2022 7/14/2022 7/14/2022 7/14/2022 7/14/2022 7/14/2022   Type HeartMate3 HeartMate3 HeartMate3 HeartMate3 HeartMate3 HeartMate3 HeartMate3   Flow 4.3 4.5 4.5 4.3 4.4 4.6 4.8   Speed 5200 5200 5200 5200 5200 5200 5200   PI 3.2 3.4 3.5 3.3 3.4 3.3 3.2   Power (Serna) 3.8 3.7 3.7 3.7 3.8 3.7 3.7   LSL 4800  4800 4800 4800 4800 4800 4800   Pulsatility Intermittent pulse Intermittent pulse Intermittent pulse Intermittent pulse Intermittent pulse Intermittent pulse Intermittent pulse       Staphylococcus epidermidis bacteremia  - Blood cultures 6/20 and repeat 6/21 positive for Staph Epi. One of two blood cultures from 6/23 positive for Staph (taken prior to line exchange). Repeat cultures sent 6/25 NGTD  - IJ exchanged on 6/23, IABP exchanged 6/23  - ID recommended 14 day course of vancomycin from VAD implantation on 6/29 with end date: 7/12/22. Vancomycin discontinued per CTS with concerns for elevated sCr. ID with new recs to complete 7d course of daptomycin, which was completed 7/7/22    Uncontrolled diabetes mellitus  Admitted 5/24-5/27 with hyperglycemia hyperosmolar syndrome  - Hgb A1C 9.2 on 5/20/22  - Endocrine following, on insulin gtt    CKD (chronic kidney disease) stage 3, GFR 30-59 ml/min  SCr baseline ~ 1.5-1.7        Uninterrupted Critical Care/Counseling Time (not including procedures): 60 minutes      Denise Espinoza PA-C  Heart Transplant  [facility]

## 2022-07-14 NOTE — ASSESSMENT & PLAN NOTE
- Pt reports paresthesias in right toes and fingers x 3 days   - Possible radiculopathy vs electrolyte abnormality vs neurovascular etiology   - Consulted General Neurology, suspect compression from surgical positioning/carpal tunnel syndrome. Recommended brace to L hand/wrist. If no improvement, will need outpatient EMG/NCS

## 2022-07-14 NOTE — SUBJECTIVE & OBJECTIVE
Interval History: NAEON. No complaints. Diuresing well with IV Lasix gtt at 40 mg/hr. Net -ve 4.3 L/24 hours and CVP 14. SVO2 56%, CO 5.9, CI 3.8,  on epi 0.03.     Continuous Infusions:   sodium chloride 0.9% 5 mL/hr at 06/27/22 0055    sodium chloride 0.9% Stopped (07/05/22 1314)    EPINEPHrine 0.03 mcg/kg/min (07/14/22 0900)    furosemide (LASIX) 2 mg/mL continuous infusion (non-titrating) 40 mg/hr (07/14/22 0900)     Scheduled Meds:   acetaminophen  650 mg Oral Q8H    busPIRone  7.5 mg Oral Daily    famotidine  40 mg Oral Daily    ferrous gluconate  324 mg Oral Daily with breakfast    gabapentin  100 mg Oral TID    insulin aspart U-100  10 Units Subcutaneous TIDWM    insulin detemir U-100  18 Units Subcutaneous QHS    INV aspirin/placebo  100 mg Oral Daily    LIDOcaine  1 patch Transdermal Q24H    polyethylene glycol  17 g Oral Daily    potassium bicarbonate  50 mEq Oral TID    sodium chloride 0.9%  10 mL Intravenous Q6H    tiZANidine  2 mg Oral Q8H    traZODone  50 mg Oral QHS    warfarin  4 mg Oral Daily     PRN Meds:albuterol sulfate, bisacodyL, dextrose 10%, dextrose 10%, glucagon (human recombinant), glucose, glucose, insulin aspart U-100, magnesium sulfate IVPB, oxyCODONE, potassium bicarbonate, potassium bicarbonate, potassium bicarbonate, sodium chloride 0.9%, sodium chloride 0.9%, Flushing PICC Protocol **AND** sodium chloride 0.9% **AND** sodium chloride 0.9%, traMADoL    Review of patient's allergies indicates:   Allergen Reactions    Aspirin Other (See Comments)     Mr. Thacker is enrolled in Dr. Paige's Alon Trial and cannot have any aspirin and/or aspirin-containing products. DO NOT cancel any orders for INV Aspirin 100 mg/Placebo. If you have questions, please contact Isabel @ [phone], [phone],[email or web address], secure chat or MS Teams message.    Bumex [bumetanide] Hives    Lactose Diarrhea     Other reaction(s): Abdominal distension, gaseous    Torsemide Hives     Objective:      Vital Signs (Most Recent):  Temp: 98.8 °F (37.1 °C) (07/14/22 0700)  Pulse: 96 (07/14/22 1030)  Resp: 10 (07/14/22 1030)  BP: (!) 84/0 (07/13/22 1515)  SpO2: 99 % (07/14/22 0700)   Vital Signs (24h Range):  Temp:  [98.4 °F (36.9 °C)-99 °F (37.2 °C)] 98.8 °F (37.1 °C)  Pulse:  [] 96  Resp:  [10-40] 10  SpO2:  [98 %-100 %] 99 %  BP: (84)/(0) 84/0  Arterial Line BP: (75-96)/(62-87) 78/66     Patient Vitals for the past 72 hrs (Last 3 readings):   Weight   07/14/22 0500 90 kg (198 lb 6.6 oz)   07/12/22 1603 105 kg (231 lb 7.7 oz)       Body mass index is 24.8 kg/m².      Intake/Output Summary (Last 24 hours) at 7/14/2022 1122  Last data filed at 7/14/2022 0900  Gross per 24 hour   Intake 1365.74 ml   Output 5475 ml   Net -4109.26 ml         Hemodynamic Parameters:       Telemetry: ST    Physical Exam  Vitals and nursing note reviewed.   Constitutional:       Appearance: Normal appearance.      Comments: Sitting in chair   HENT:      Head: Normocephalic and atraumatic.   Eyes:      Extraocular Movements: Extraocular movements intact.      Conjunctiva/sclera: Conjunctivae normal.   Cardiovascular:      Rate and Rhythm: Regular rhythm. Tachycardia present.      Comments: Smooth VAD hum  Pulmonary:      Breath sounds: Normal breath sounds.   Abdominal:      Palpations: Abdomen is soft.   Musculoskeletal:         General: No swelling.      Cervical back: Neck supple.      Right lower leg: No edema.      Left lower leg: No edema.   Skin:     General: Skin is dry.      Capillary Refill: Capillary refill takes 2 to 3 seconds.   Neurological:      General: No focal deficit present.      Mental Status: He is alert and oriented to person, place, and time.      Motor: No weakness.   Psychiatric:         Mood and Affect: Mood normal.         Behavior: Behavior normal.         Thought Content: Thought content normal.         Judgment: Judgment normal.       Significant Labs:  CBC:  Recent Labs   Lab 07/12/22  6845  07/13/22 0312 07/14/22 0333   WBC 13.64* 12.08 10.91   RBC 2.64* 2.71* 2.67*   HGB 7.1* 7.1* 6.9*   HCT 22.9* 22.8* 22.7*    445 479*   MCV 87 84 85   MCH 26.9* 26.2* 25.8*   MCHC 31.0* 31.1* 30.4*       BNP:  Recent Labs   Lab 07/11/22  0234 07/13/22 0312 07/14/22 0333   * 624* 458*       CMP:  Recent Labs   Lab 07/12/22  0309 07/12/22  0810 07/13/22  0008 07/13/22 0312 07/13/22  1050 07/13/22  1615 07/13/22  2100 07/14/22 0335   *  --  194* 163*  --   --   --  197*   CALCIUM 8.9  --  9.1 9.0  --   --   --  9.6   ALBUMIN 3.0*  --   --  2.8*  --   --   --  3.0*   PROT 6.8  --   --  7.5  --   --   --  7.7   *  --  133* 132*  --   --   --  132*   K 3.1*  2.9*   < > 3.6 3.2*  3.2*   < > 4.0 3.2* 3.6   CO2 29  --  29 31*  --   --   --  31*   CL 92*  --  94* 89*  --   --   --  89*   BUN 15  --  11 11  --   --   --  13   CREATININE 1.3  --  1.1 1.1  --   --   --  1.4   ALKPHOS 161*  --   --  165*  --   --   --  162*   ALT 62*  --   --  51*  --   --   --  43   AST 40  --   --  37  --   --   --  34   BILITOT 0.9  --   --  0.9  --   --   --  1.0    < > = values in this interval not displayed.        Coagulation:   Recent Labs   Lab 07/12/22  0309 07/12/22  0810 07/13/22  0312 07/14/22  0333   INR  --  2.4* 2.5* 2.3*   APTT 33.1*  --  32.9* 32.2*       LDH:  Recent Labs   Lab 07/12/22  0309 07/13/22  0312 07/14/22  0333   * 415* 448*       Microbiology:  Microbiology Results (last 7 days)       Procedure Component Value Units Date/Time    Blood culture [[id number]] Collected: 07/02/22 1629    Order Status: Completed Specimen: Blood from Peripheral, Hand, Right Updated: 07/07/22 1812     Blood Culture, Routine No growth after 5 days.    Blood culture [[id number]] Collected: 07/02/22 1618    Order Status: Completed Specimen: Blood from Peripheral, Antecubital, Right Updated: 07/07/22 1812     Blood Culture, Routine No growth after 5 days.            I have reviewed all pertinent labs  within the past 24 hours.    Estimated Creatinine Clearance: 86.3 mL/min (based on SCr of 1.4 mg/dL).    Diagnostic Results:  I have reviewed and interpreted all pertinent imaging results/findings within the past 24 hours.

## 2022-07-14 NOTE — PLAN OF CARE
Recommendations    1) Continue cardiac diet + fluid restriction per physician. RD added consistent carbohydrate restriction to aid in blood glucose control.     2) RD to monitor and f/u.    Goals: Meet % EEN/EPN by RD f/u  Nutrition Goal Status: goal met  Communication of RD Recs:  (POC)

## 2022-07-14 NOTE — PLAN OF CARE
Hemodynamics Care Update Note       SVO2: 52  CVP: 19  CO: 7.9  CI: 3.4  SVR: 617.7       (calculated using oxygen consumption constant of 3.5)       Plan:  No changes made   Case discussed with on call HTS attending.    Donn Baker, PGY4  Cardiovascular Disease  [facility]

## 2022-07-14 NOTE — PLAN OF CARE
POC reviewed with pt and significant other at bedside, VSS, no acute events overnight. CVP 17 this AM, down from 19, SvO2 56%, up from 52%. AM lab results reviewed, electrolytes replaced, no VAD alarms, speed 5200. All questions answered and addressed.

## 2022-07-15 LAB
ALBUMIN SERPL BCP-MCNC: 2.9 G/DL (ref 3.5–5.2)
ALLENS TEST: ABNORMAL
ALLENS TEST: ABNORMAL
ALLENS TEST: NORMAL
ALP SERPL-CCNC: 167 U/L (ref 55–135)
ALT SERPL W/O P-5'-P-CCNC: 37 U/L (ref 10–44)
ANION GAP SERPL CALC-SCNC: 13 MMOL/L (ref 8–16)
APTT BLDCRRT: 31.7 SEC (ref 21–32)
AST SERPL-CCNC: 32 U/L (ref 10–40)
BASOPHILS # BLD AUTO: 0.03 K/UL (ref 0–0.2)
BASOPHILS NFR BLD: 0.3 % (ref 0–1.9)
BILIRUB SERPL-MCNC: 1 MG/DL (ref 0.1–1)
BUN SERPL-MCNC: 13 MG/DL (ref 6–20)
CALCIUM SERPL-MCNC: 9.4 MG/DL (ref 8.7–10.5)
CHLORIDE SERPL-SCNC: 89 MMOL/L (ref 95–110)
CO2 SERPL-SCNC: 30 MMOL/L (ref 23–29)
CREAT SERPL-MCNC: 1.4 MG/DL (ref 0.5–1.4)
DELSYS: ABNORMAL
DELSYS: ABNORMAL
DELSYS: NORMAL
DIFFERENTIAL METHOD: ABNORMAL
EOSINOPHIL # BLD AUTO: 0.1 K/UL (ref 0–0.5)
EOSINOPHIL NFR BLD: 0.9 % (ref 0–8)
ERYTHROCYTE [DISTWIDTH] IN BLOOD BY AUTOMATED COUNT: 17.8 % (ref 11.5–14.5)
EST. GFR  (AFRICAN AMERICAN): >60 ML/MIN/1.73 M^2
EST. GFR  (NON AFRICAN AMERICAN): >60 ML/MIN/1.73 M^2
FIBRINOGEN PPP-MCNC: 560 MG/DL (ref 182–400)
GLUCOSE SERPL-MCNC: 263 MG/DL (ref 70–110)
HCO3 UR-SCNC: 32.7 MMOL/L (ref 24–28)
HCO3 UR-SCNC: 36.6 MMOL/L (ref 24–28)
HCT VFR BLD AUTO: 23.4 % (ref 40–54)
HGB BLD-MCNC: 7.3 G/DL (ref 14–18)
IMM GRANULOCYTES # BLD AUTO: 0.02 K/UL (ref 0–0.04)
IMM GRANULOCYTES NFR BLD AUTO: 0.2 % (ref 0–0.5)
INR PPP: 2.1 (ref 0.8–1.2)
LDH SERPL L TO P-CCNC: 1.19 MMOL/L (ref 0.5–2.2)
LDH SERPL L TO P-CCNC: 424 U/L (ref 110–260)
LYMPHOCYTES # BLD AUTO: 1.7 K/UL (ref 1–4.8)
LYMPHOCYTES NFR BLD: 16.1 % (ref 18–48)
MAGNESIUM SERPL-MCNC: 1.9 MG/DL (ref 1.6–2.6)
MAGNESIUM SERPL-MCNC: 2 MG/DL (ref 1.6–2.6)
MAGNESIUM SERPL-MCNC: 2.1 MG/DL (ref 1.6–2.6)
MAGNESIUM SERPL-MCNC: 2.5 MG/DL (ref 1.6–2.6)
MCH RBC QN AUTO: 25.6 PG (ref 27–31)
MCHC RBC AUTO-ENTMCNC: 31.2 G/DL (ref 32–36)
MCV RBC AUTO: 82 FL (ref 82–98)
MONOCYTES # BLD AUTO: 0.9 K/UL (ref 0.3–1)
MONOCYTES NFR BLD: 8.2 % (ref 4–15)
NEUTROPHILS # BLD AUTO: 7.8 K/UL (ref 1.8–7.7)
NEUTROPHILS NFR BLD: 74.3 % (ref 38–73)
NRBC BLD-RTO: 0 /100 WBC
PCO2 BLDA: 52.2 MMHG (ref 35–45)
PCO2 BLDA: 53 MMHG (ref 35–45)
PH SMN: 7.41 [PH] (ref 7.35–7.45)
PH SMN: 7.45 [PH] (ref 7.35–7.45)
PLATELET # BLD AUTO: 485 K/UL (ref 150–450)
PMV BLD AUTO: 9.6 FL (ref 9.2–12.9)
PO2 BLDA: 23 MMHG (ref 40–60)
PO2 BLDA: 26 MMHG (ref 40–60)
POC BE: 13 MMOL/L
POC BE: 8 MMOL/L
POC SATURATED O2: 39 % (ref 95–100)
POC SATURATED O2: 50 % (ref 95–100)
POC TCO2: 34 MMOL/L (ref 24–29)
POC TCO2: 38 MMOL/L (ref 24–29)
POCT GLUCOSE: 132 MG/DL (ref 70–110)
POCT GLUCOSE: 339 MG/DL (ref 70–110)
POCT GLUCOSE: 360 MG/DL (ref 70–110)
POCT GLUCOSE: 90 MG/DL (ref 70–110)
POTASSIUM SERPL-SCNC: 3.3 MMOL/L (ref 3.5–5.1)
POTASSIUM SERPL-SCNC: 3.3 MMOL/L (ref 3.5–5.1)
POTASSIUM SERPL-SCNC: 3.5 MMOL/L (ref 3.5–5.1)
POTASSIUM SERPL-SCNC: 4.4 MMOL/L (ref 3.5–5.1)
PROT SERPL-MCNC: 7.8 G/DL (ref 6–8.4)
PROTHROMBIN TIME: 21.3 SEC (ref 9–12.5)
RBC # BLD AUTO: 2.85 M/UL (ref 4.6–6.2)
SAMPLE: ABNORMAL
SAMPLE: ABNORMAL
SAMPLE: NORMAL
SITE: ABNORMAL
SITE: ABNORMAL
SITE: NORMAL
SODIUM SERPL-SCNC: 132 MMOL/L (ref 136–145)
WBC # BLD AUTO: 10.55 K/UL (ref 3.9–12.7)

## 2022-07-15 PROCEDURE — 25000003 PHARM REV CODE 250: Performed by: STUDENT IN AN ORGANIZED HEALTH CARE EDUCATION/TRAINING PROGRAM

## 2022-07-15 PROCEDURE — 83605 ASSAY OF LACTIC ACID: CPT

## 2022-07-15 PROCEDURE — A4216 STERILE WATER/SALINE, 10 ML: HCPCS | Performed by: THORACIC SURGERY (CARDIOTHORACIC VASCULAR SURGERY)

## 2022-07-15 PROCEDURE — 84132 ASSAY OF SERUM POTASSIUM: CPT

## 2022-07-15 PROCEDURE — 99900035 HC TECH TIME PER 15 MIN (STAT)

## 2022-07-15 PROCEDURE — 63600175 PHARM REV CODE 636 W HCPCS: Performed by: STUDENT IN AN ORGANIZED HEALTH CARE EDUCATION/TRAINING PROGRAM

## 2022-07-15 PROCEDURE — 25000003 PHARM REV CODE 250: Performed by: PHYSICIAN ASSISTANT

## 2022-07-15 PROCEDURE — 20000000 HC ICU ROOM

## 2022-07-15 PROCEDURE — 25000003 PHARM REV CODE 250: Performed by: ANESTHESIOLOGY

## 2022-07-15 PROCEDURE — 97535 SELF CARE MNGMENT TRAINING: CPT

## 2022-07-15 PROCEDURE — 99291 CRITICAL CARE FIRST HOUR: CPT | Mod: ,,, | Performed by: PHYSICIAN ASSISTANT

## 2022-07-15 PROCEDURE — 94761 N-INVAS EAR/PLS OXIMETRY MLT: CPT

## 2022-07-15 PROCEDURE — 85384 FIBRINOGEN ACTIVITY: CPT | Performed by: STUDENT IN AN ORGANIZED HEALTH CARE EDUCATION/TRAINING PROGRAM

## 2022-07-15 PROCEDURE — 85730 THROMBOPLASTIN TIME PARTIAL: CPT | Performed by: STUDENT IN AN ORGANIZED HEALTH CARE EDUCATION/TRAINING PROGRAM

## 2022-07-15 PROCEDURE — 82803 BLOOD GASES ANY COMBINATION: CPT

## 2022-07-15 PROCEDURE — 25000003 PHARM REV CODE 250

## 2022-07-15 PROCEDURE — 93750 PR INTERROGATE VENT ASSIST DEV, IN PERSON, W PHYSICIAN ANALYSIS: ICD-10-PCS | Mod: ,,, | Performed by: INTERNAL MEDICINE

## 2022-07-15 PROCEDURE — 93750 INTERROGATION VAD IN PERSON: CPT | Mod: ,,, | Performed by: INTERNAL MEDICINE

## 2022-07-15 PROCEDURE — 85025 COMPLETE CBC W/AUTO DIFF WBC: CPT | Performed by: INTERNAL MEDICINE

## 2022-07-15 PROCEDURE — 83735 ASSAY OF MAGNESIUM: CPT | Mod: 91

## 2022-07-15 PROCEDURE — 80053 COMPREHEN METABOLIC PANEL: CPT

## 2022-07-15 PROCEDURE — 25000003 PHARM REV CODE 250: Performed by: INTERNAL MEDICINE

## 2022-07-15 PROCEDURE — 83615 LACTATE (LD) (LDH) ENZYME: CPT | Performed by: THORACIC SURGERY (CARDIOTHORACIC VASCULAR SURGERY)

## 2022-07-15 PROCEDURE — 99291 PR CRITICAL CARE, E/M 30-74 MINUTES: ICD-10-PCS | Mod: ,,, | Performed by: PHYSICIAN ASSISTANT

## 2022-07-15 PROCEDURE — 25000003 PHARM REV CODE 250: Performed by: THORACIC SURGERY (CARDIOTHORACIC VASCULAR SURGERY)

## 2022-07-15 PROCEDURE — 97116 GAIT TRAINING THERAPY: CPT

## 2022-07-15 PROCEDURE — 83735 ASSAY OF MAGNESIUM: CPT | Performed by: PHYSICIAN ASSISTANT

## 2022-07-15 PROCEDURE — 85610 PROTHROMBIN TIME: CPT | Performed by: PHYSICIAN ASSISTANT

## 2022-07-15 PROCEDURE — 27000248 HC VAD-ADDITIONAL DAY

## 2022-07-15 RX ORDER — POTASSIUM CHLORIDE 20 MEQ/1
40 TABLET, EXTENDED RELEASE ORAL 3 TIMES DAILY
Status: DISCONTINUED | OUTPATIENT
Start: 2022-07-15 | End: 2022-07-28 | Stop reason: HOSPADM

## 2022-07-15 RX ORDER — POTASSIUM CHLORIDE 20 MEQ/1
60 TABLET, EXTENDED RELEASE ORAL 3 TIMES DAILY
Status: DISCONTINUED | OUTPATIENT
Start: 2022-07-15 | End: 2022-07-15

## 2022-07-15 RX ORDER — WARFARIN SODIUM 5 MG/1
5 TABLET ORAL
Status: DISCONTINUED | OUTPATIENT
Start: 2022-07-16 | End: 2022-07-17

## 2022-07-15 RX ADMIN — BUSPIRONE HYDROCHLORIDE 7.5 MG: 5 TABLET ORAL at 06:07

## 2022-07-15 RX ADMIN — ACETAMINOPHEN 650 MG: 325 TABLET ORAL at 09:07

## 2022-07-15 RX ADMIN — INSULIN ASPART 10 UNITS: 100 INJECTION, SOLUTION INTRAVENOUS; SUBCUTANEOUS at 07:07

## 2022-07-15 RX ADMIN — OXYCODONE HYDROCHLORIDE 10 MG: 10 TABLET ORAL at 03:07

## 2022-07-15 RX ADMIN — ACETAMINOPHEN 650 MG: 325 TABLET ORAL at 02:07

## 2022-07-15 RX ADMIN — POTASSIUM BICARBONATE 35 MEQ: 391 TABLET, EFFERVESCENT ORAL at 06:07

## 2022-07-15 RX ADMIN — FUROSEMIDE 40 MG/HR: 10 INJECTION, SOLUTION INTRAMUSCULAR; INTRAVENOUS at 11:07

## 2022-07-15 RX ADMIN — POTASSIUM CHLORIDE 40 MEQ: 20 TABLET, EXTENDED RELEASE ORAL at 02:07

## 2022-07-15 RX ADMIN — POTASSIUM BICARBONATE 35 MEQ: 391 TABLET, EFFERVESCENT ORAL at 04:07

## 2022-07-15 RX ADMIN — FUROSEMIDE 40 MG/HR: 10 INJECTION, SOLUTION INTRAMUSCULAR; INTRAVENOUS at 01:07

## 2022-07-15 RX ADMIN — INSULIN ASPART 4 UNITS: 100 INJECTION, SOLUTION INTRAVENOUS; SUBCUTANEOUS at 09:07

## 2022-07-15 RX ADMIN — Medication 10 ML: at 05:07

## 2022-07-15 RX ADMIN — TRAZODONE HYDROCHLORIDE 50 MG: 50 TABLET ORAL at 08:07

## 2022-07-15 RX ADMIN — TIZANIDINE 2 MG: 2 TABLET ORAL at 06:07

## 2022-07-15 RX ADMIN — FUROSEMIDE 40 MG/HR: 10 INJECTION, SOLUTION INTRAMUSCULAR; INTRAVENOUS at 10:07

## 2022-07-15 RX ADMIN — TIZANIDINE 2 MG: 2 TABLET ORAL at 08:07

## 2022-07-15 RX ADMIN — POLYETHYLENE GLYCOL 3350 17 G: 17 POWDER, FOR SOLUTION ORAL at 08:07

## 2022-07-15 RX ADMIN — FUROSEMIDE 40 MG/HR: 10 INJECTION, SOLUTION INTRAMUSCULAR; INTRAVENOUS at 06:07

## 2022-07-15 RX ADMIN — INSULIN ASPART 10 UNITS: 100 INJECTION, SOLUTION INTRAVENOUS; SUBCUTANEOUS at 11:07

## 2022-07-15 RX ADMIN — WARFARIN SODIUM 7.5 MG: 5 TABLET ORAL at 05:07

## 2022-07-15 RX ADMIN — TIZANIDINE 2 MG: 2 TABLET ORAL at 02:07

## 2022-07-15 RX ADMIN — POTASSIUM BICARBONATE 50 MEQ: 978 TABLET, EFFERVESCENT ORAL at 08:07

## 2022-07-15 RX ADMIN — FAMOTIDINE 40 MG: 20 TABLET ORAL at 08:07

## 2022-07-15 RX ADMIN — OXYCODONE HYDROCHLORIDE 10 MG: 10 TABLET ORAL at 08:07

## 2022-07-15 RX ADMIN — GABAPENTIN 100 MG: 100 CAPSULE ORAL at 08:07

## 2022-07-15 RX ADMIN — FUROSEMIDE 40 MG/HR: 10 INJECTION, SOLUTION INTRAMUSCULAR; INTRAVENOUS at 04:07

## 2022-07-15 RX ADMIN — Medication 10 ML: at 12:07

## 2022-07-15 RX ADMIN — POTASSIUM CHLORIDE 40 MEQ: 20 TABLET, EXTENDED RELEASE ORAL at 08:07

## 2022-07-15 RX ADMIN — Medication 10 ML: at 06:07

## 2022-07-15 RX ADMIN — GABAPENTIN 100 MG: 100 CAPSULE ORAL at 02:07

## 2022-07-15 RX ADMIN — MAGNESIUM SULFATE HEPTAHYDRATE 2 G: 40 INJECTION, SOLUTION INTRAVENOUS at 04:07

## 2022-07-15 RX ADMIN — OXYCODONE HYDROCHLORIDE 10 MG: 10 TABLET ORAL at 02:07

## 2022-07-15 RX ADMIN — Medication 324 MG: at 08:07

## 2022-07-15 RX ADMIN — ACETAMINOPHEN 650 MG: 325 TABLET ORAL at 06:07

## 2022-07-15 NOTE — SUBJECTIVE & OBJECTIVE
Interval History: NAEON. No complaints. Epi weaned to 0.01 overnight.     Continuous Infusions:   sodium chloride 0.9% 5 mL/hr at 06/27/22 0055    sodium chloride 0.9% Stopped (07/05/22 1314)    EPINEPHrine 0.02 mcg/kg/min (07/15/22 1500)    furosemide (LASIX) 2 mg/mL continuous infusion (non-titrating) 40 mg/hr (07/15/22 1649)     Scheduled Meds:   acetaminophen  650 mg Oral Q8H    busPIRone  7.5 mg Oral Daily    famotidine  40 mg Oral Daily    ferrous gluconate  324 mg Oral Daily with breakfast    gabapentin  100 mg Oral TID    insulin aspart U-100  10 Units Subcutaneous TIDWM    insulin detemir U-100  18 Units Subcutaneous QHS    INV aspirin/placebo  100 mg Oral Daily    LIDOcaine  1 patch Transdermal Q24H    polyethylene glycol  17 g Oral Daily    potassium chloride  40 mEq Oral TID    sodium chloride 0.9%  10 mL Intravenous Q6H    tiZANidine  2 mg Oral Q8H    traZODone  50 mg Oral QHS    [START ON 7/16/2022] warfarin  5 mg Oral Every Tues, Thurs, Sat, Sun    warfarin  7.5 mg Oral Every Mon, Wed, Fri     PRN Meds:albuterol sulfate, bisacodyL, dextrose 10%, dextrose 10%, docusate sodium, glucagon (human recombinant), glucose, glucose, insulin aspart U-100, magnesium sulfate IVPB, oxyCODONE, polyethylene glycol, sodium chloride 0.9%, sodium chloride 0.9%, Flushing PICC Protocol **AND** sodium chloride 0.9% **AND** sodium chloride 0.9%, traMADoL    Review of patient's allergies indicates:   Allergen Reactions    Aspirin Other (See Comments)     Mr. Thacker is enrolled in Dr. Paige's Alon Trial and cannot have any aspirin and/or aspirin-containing products. DO NOT cancel any orders for INV Aspirin 100 mg/Placebo. If you have questions, please contact Isabel @ [phone], [phone],[email or web address], secure chat or MS Teams message.    Bumex [bumetanide] Hives    Lactose Diarrhea     Other reaction(s): Abdominal distension, gaseous    Torsemide Hives     Objective:     Vital Signs (Most Recent):  Temp: 98.6 °F  (37 °C) (07/15/22 1500)  Pulse: 103 (07/15/22 1700)  Resp: (!) 9 (07/15/22 1700)  BP: (!) 98/54 (07/15/22 1600)  SpO2: 99 % (07/15/22 1600)   Vital Signs (24h Range):  Temp:  [98.6 °F (37 °C)-99.5 °F (37.5 °C)] 98.6 °F (37 °C)  Pulse:  [] 103  Resp:  [9-45] 9  SpO2:  [99 %-100 %] 99 %  BP: ()/(54-85) 98/54  Arterial Line BP: (69-88)/(53-79) 82/71     Patient Vitals for the past 72 hrs (Last 3 readings):   Weight   07/15/22 0500 91 kg (200 lb 9.9 oz)   07/14/22 0500 90 kg (198 lb 6.6 oz)       Body mass index is 25.08 kg/m².      Intake/Output Summary (Last 24 hours) at 7/15/2022 1708  Last data filed at 7/15/2022 1500  Gross per 24 hour   Intake 1710.64 ml   Output 4030 ml   Net -2319.36 ml       Hemodynamic Parameters: CVP 12       Telemetry: ST    Physical Exam  Vitals and nursing note reviewed.   Constitutional:       Appearance: Normal appearance.      Comments: Sitting in chair   HENT:      Head: Normocephalic and atraumatic.   Eyes:      Extraocular Movements: Extraocular movements intact.      Conjunctiva/sclera: Conjunctivae normal.   Cardiovascular:      Rate and Rhythm: Regular rhythm. Tachycardia present.      Comments: Smooth VAD hum  Pulmonary:      Breath sounds: Normal breath sounds.   Abdominal:      Palpations: Abdomen is soft.   Musculoskeletal:         General: No swelling.      Cervical back: Neck supple.      Right lower leg: No edema.      Left lower leg: No edema.   Skin:     General: Skin is dry.      Capillary Refill: Capillary refill takes 2 to 3 seconds.   Neurological:      General: No focal deficit present.      Mental Status: He is alert and oriented to person, place, and time.      Motor: No weakness.   Psychiatric:         Mood and Affect: Mood normal.         Behavior: Behavior normal.         Thought Content: Thought content normal.         Judgment: Judgment normal.       Significant Labs:  CBC:  Recent Labs   Lab 07/13/22  0312 07/14/22  0333 07/15/22  0341   WBC 12.08  10.91 10.55   RBC 2.71* 2.67* 2.85*   HGB 7.1* 6.9* 7.3*   HCT 22.8* 22.7* 23.4*    479* 485*   MCV 84 85 82   MCH 26.2* 25.8* 25.6*   MCHC 31.1* 30.4* 31.2*       BNP:  Recent Labs   Lab 07/11/22  0234 07/13/22 0312 07/14/22 0333   * 624* 458*       CMP:  Recent Labs   Lab 07/13/22 0312 07/13/22  1050 07/14/22  0335 07/14/22  1106 07/15/22  0341 07/15/22  1048 07/15/22  1603   *  --  197*  --  263*  --   --    CALCIUM 9.0  --  9.6  --  9.4  --   --    ALBUMIN 2.8*  --  3.0*  --  2.9*  --   --    PROT 7.5  --  7.7  --  7.8  --   --    *  --  132*  --  132*  --   --    K 3.2*  3.2*   < > 3.6   < > 3.3* 4.4 3.3*   CO2 31*  --  31*  --  30*  --   --    CL 89*  --  89*  --  89*  --   --    BUN 11  --  13  --  13  --   --    CREATININE 1.1  --  1.4  --  1.4  --   --    ALKPHOS 165*  --  162*  --  167*  --   --    ALT 51*  --  43  --  37  --   --    AST 37  --  34  --  32  --   --    BILITOT 0.9  --  1.0  --  1.0  --   --     < > = values in this interval not displayed.        Coagulation:   Recent Labs   Lab 07/13/22  0312 07/14/22  0333 07/15/22  0341   INR 2.5* 2.3* 2.1*   APTT 32.9* 32.2* 31.7       LDH:  Recent Labs   Lab 07/13/22  0312 07/14/22  0333 07/15/22  0341   * 448* 424*       Microbiology:  Microbiology Results (last 7 days)       ** No results found for the last 168 hours. **            I have reviewed all pertinent labs within the past 24 hours.    Estimated Creatinine Clearance: 86.3 mL/min (based on SCr of 1.4 mg/dL).    Diagnostic Results:  I have reviewed and interpreted all pertinent imaging results/findings within the past 24 hours.

## 2022-07-15 NOTE — PT/OT/SLP PROGRESS
Physical Therapy Treatment    Patient Name:  Kevan Queen   MRN:  [id number]  Admit Date: 6/13/2022  Admitting Diagnosis:  Acute on chronic combined systolic and diastolic congestive heart failure, NYHA class 4   Length of Stay: 32 days  Recent Surgery: Procedure(s) (LRB):  CLOSURE, WOUND, STERNUM (N/A)  INSERTION-RIGHT VENTRICULAR ASSIST DEVICE (Right)  APPLICATION, WOUND VAC (N/A)  IRRIGATION, MEDIASTINUM 15 Days Post-Op    Recommendations:     Discharge Recommendations:  home   Discharge Equipment Recommendations: none   Barriers to discharge: None    Plan:     During this hospitalization, patient to be seen 3x/week to address the listed problems via gait training, therapeutic activities, therapeutic exercises, neuromuscular re-education  · Plan of Care Expires:  07/01/22   Plan of Care Reviewed with: patient, spouse    Assessment:     Kevan Queen is a 37 y.o. male admitted with a medical diagnosis of Acute on chronic combined systolic and diastolic congestive heart failure, NYHA class 4.  Patient found alert and cooperative with therapy. Vitals remained stable. Patient able to improve ambulation distance over all and ambulation distance before rest break. Sob with ambulation is till present however much improved. Progress next session to increase ambulation distance to improve activity tolerance and reach goals of care. Patient is appropriate to return home upon hospital discharge.     Problem List: weakness, impaired endurance, impaired functional mobility, impaired balance, decreased upper extremity function, impaired cardiopulmonary response to activity.  Rehab Prognosis: Good     GOALS:   Multidisciplinary Problems     Physical Therapy Goals        Problem: Physical Therapy    Goal Priority Disciplines Outcome Goal Variances Interventions   Physical Therapy Goal     PT, PT/OT Ongoing, Progressing     Description: Goals to be met by: 7/31/2022    Patient will increase functional independence with mobility  "by performin. Supine to sit with MInimal Assistance -not met  2. Sit to stand transfer with Minimal Assistance - met   2a. Sit to stand transfer with supervision- met   2b. Sit to stand with independence   3. Gait  x 50 feet with Contact Guard Assistance - met    3a. Gait 400 ft with Supervision                  Multidisciplinary Problems (Resolved)        Problem: Physical Therapy    Goal Priority Disciplines Outcome Goal Variances Interventions   Physical Therapy Goal   (Resolved)     PT, PT/OT Met     Description:     Problem: Physical Therapy  Goal: Physical Therapy Goal  Description: Goals to be met by: 2022     Patient will increase functional independence with mobility by performin. Lower extremity exercise program without IABP 30 reps per handout, with independence  2.Upper extremity exercise program 30 reps per handout, with independence                         Subjective   Communicated with RN prior to session.  Patient found standing with OT performeing ADL's upon PT entry to room, agreeable to evaluation. Kevan Queen's wife present during session.    Chief Complaint: no complaints   Patient/Family Comments/goals: to return to PLOF   Pain/Comfort:  · Pain Rating 1: 0/10  · Pain Rating Post-Intervention 1: 0/10    Objective:   Patient found with: PICC line, arterial line, LVAD, telemetry   General Precautions: Standard, Cardiac fall, LVAD, sternal   Orthopedic Precautions:N/A   Braces: N/A   Oxygen Device: Room Air  Vitals: BP 90/68 (BP Location: Left arm, Patient Position: Lying)   Pulse 103   Temp 98.6 °F (37 °C) (Oral)   Resp 20   Ht 6' 3" (1.905 m)   Wt 91 kg (200 lb 9.9 oz)   SpO2 100%   BMI 25.08 kg/m²     Outcome Measures:  AM-PAC 6 CLICK MOBILITY  Turning over in bed (including adjusting bedclothes, sheets and blankets)?: 4  Sitting down on and standing up from a chair with arms (e.g., wheelchair, bedside commode, etc.): 4  Moving from lying on back to " sitting on the side of the bed?: 4  Moving to and from a bed to a chair (including a wheelchair)?: 3  Need to walk in hospital room?: 3  Climbing 3-5 steps with a railing?: 3  Basic Mobility Total Score: 21     Cognition:   · Alert and Cooperative  · Command following: Follows multistep  commands  · Fluency: clear/fluent    Functional Mobility:  Additional staff present: Supervising PT  Bed Mobility:    Not performed secondary to being found upright in chair.     Transfers:    Not performed secondary to being found standing at sink performing ADL's with OT.   Gait:   · Patient ambulated: 210 ft + 66 ft + 50 ft  with standing rest break in between trials   · Patient required: standy by assistance  · Patient used: no assistive device  · Gait Pattern observed: reciprocal gait  · Gait Deviation(s): occasional unsteady gait  · Impairments due to: decreased strength and decreased endurance  · Comments:   · Mask donned; portable monitor taken, Iv pole taken; RN notified   · Minor SOB       Therapeutic Activities, Exercises, and Education:   Patient educated on PT role and POC.  Patient educated on importance of daily ambulation and OOB activity.   Patient educated on sternal precautions.   Patient verbalized understanding.       Patient left sitting edge of bed  and reconnected to wall power with all lines intact, call button in reach and RN notified.    Time Tracking:     PT Received On: 07/15/22  PT Start Time: 1120     PT Stop Time: 1135  PT Total Time (min): 15 min       Billable Minutes:   · Gait Training 15    Treatment Type: Treatment  PT/PTA: PT

## 2022-07-15 NOTE — PROGRESS NOTES
[facility] - Surgical Intensive Care  Heart Transplant  Progress Note    Patient Name: Kevan Queen  MRN: [id number]  Admission Date: 6/13/2022  Hospital Length of Stay: 32 days  Attending Physician: Josh Pulido MD  Primary Care Provider: ORALIA Cline  Principal Problem:Acute on chronic combined systolic and diastolic congestive heart failure, NYHA class 4    Subjective:     Interval History: NAEON. No complaints. Diuresing well with IV Lasix gtt at 40 mg/hr. Net -ve 3.1 L/24 hours and CVP 12. SVO2 50%, CO 6.5, CI 2.9,  on epi 0.03. Going down slowly on his epi. Removing his Arterial line.     Continuous Infusions:   sodium chloride 0.9% 5 mL/hr at 06/27/22 0055    sodium chloride 0.9% Stopped (07/05/22 1314)    EPINEPHrine 0.02 mcg/kg/min (07/15/22 1400)    furosemide (LASIX) 2 mg/mL continuous infusion (non-titrating) 40 mg/hr (07/15/22 1400)     Scheduled Meds:   acetaminophen  650 mg Oral Q8H    busPIRone  7.5 mg Oral Daily    famotidine  40 mg Oral Daily    ferrous gluconate  324 mg Oral Daily with breakfast    gabapentin  100 mg Oral TID    insulin aspart U-100  10 Units Subcutaneous TIDWM    insulin detemir U-100  18 Units Subcutaneous QHS    INV aspirin/placebo  100 mg Oral Daily    LIDOcaine  1 patch Transdermal Q24H    polyethylene glycol  17 g Oral Daily    potassium chloride  40 mEq Oral TID    sodium chloride 0.9%  10 mL Intravenous Q6H    tiZANidine  2 mg Oral Q8H    traZODone  50 mg Oral QHS    [START ON 7/16/2022] warfarin  5 mg Oral Every Tues, Thurs, Sat, Sun    warfarin  7.5 mg Oral Every Mon, Wed, Fri     PRN Meds:albuterol sulfate, bisacodyL, dextrose 10%, dextrose 10%, docusate sodium, glucagon (human recombinant), glucose, glucose, insulin aspart U-100, magnesium sulfate IVPB, oxyCODONE, polyethylene glycol, sodium chloride 0.9%, sodium chloride 0.9%, Flushing PICC Protocol **AND** sodium chloride 0.9% **AND** sodium chloride 0.9%, traMADoL    Review  of patient's allergies indicates:   Allergen Reactions    Aspirin Other (See Comments)     Mr. Thacker is enrolled in Dr. Paige's Alon Trial and cannot have any aspirin and/or aspirin-containing products. DO NOT cancel any orders for INV Aspirin 100 mg/Placebo. If you have questions, please contact Isabel @ [phone], [phone],[email or web address], secure chat or MS Teams message.    Bumex [bumetanide] Hives    Lactose Diarrhea     Other reaction(s): Abdominal distension, gaseous    Torsemide Hives     Objective:     Vital Signs (Most Recent):  Temp: 98.6 °F (37 °C) (07/15/22 1100)  Pulse: 106 (07/15/22 1415)  Resp: 18 (07/15/22 1433)  BP: 122/85 (07/15/22 1245)  SpO2: 99 % (07/15/22 1300)   Vital Signs (24h Range):  Temp:  [98.6 °F (37 °C)-99.5 °F (37.5 °C)] 98.6 °F (37 °C)  Pulse:  [] 106  Resp:  [9-45] 18  SpO2:  [99 %-100 %] 99 %  BP: ()/(68-85) 122/85  Arterial Line BP: (65-88)/(53-80) 82/71     Patient Vitals for the past 72 hrs (Last 3 readings):   Weight   07/15/22 0500 91 kg (200 lb 9.9 oz)   07/14/22 0500 90 kg (198 lb 6.6 oz)   07/12/22 1603 105 kg (231 lb 7.7 oz)       Body mass index is 25.08 kg/m².      Intake/Output Summary (Last 24 hours) at 7/15/2022 1455  Last data filed at 7/15/2022 1400  Gross per 24 hour   Intake 2000.48 ml   Output 4380 ml   Net -2379.52 ml         Hemodynamic Parameters:       Telemetry: ST    Physical Exam  Vitals and nursing note reviewed.   Constitutional:       Appearance: Normal appearance.      Comments: Sitting in chair   HENT:      Head: Normocephalic and atraumatic.   Eyes:      Extraocular Movements: Extraocular movements intact.      Conjunctiva/sclera: Conjunctivae normal.   Cardiovascular:      Rate and Rhythm: Regular rhythm. Tachycardia present.      Comments: Smooth VAD hum  Pulmonary:      Breath sounds: Normal breath sounds.   Abdominal:      Palpations: Abdomen is soft.   Musculoskeletal:         General: No swelling.      Cervical  back: Neck supple.      Right lower leg: No edema.      Left lower leg: No edema.   Skin:     General: Skin is dry.      Capillary Refill: Capillary refill takes 2 to 3 seconds.   Neurological:      General: No focal deficit present.      Mental Status: He is alert and oriented to person, place, and time.      Motor: No weakness.   Psychiatric:         Mood and Affect: Mood normal.         Behavior: Behavior normal.         Thought Content: Thought content normal.         Judgment: Judgment normal.       Significant Labs:  CBC:  Recent Labs   Lab 07/13/22 0312 07/14/22  0333 07/15/22  0341   WBC 12.08 10.91 10.55   RBC 2.71* 2.67* 2.85*   HGB 7.1* 6.9* 7.3*   HCT 22.8* 22.7* 23.4*    479* 485*   MCV 84 85 82   MCH 26.2* 25.8* 25.6*   MCHC 31.1* 30.4* 31.2*       BNP:  Recent Labs   Lab 07/11/22  0234 07/13/22 0312 07/14/22  0333   * 624* 458*       CMP:  Recent Labs   Lab 07/13/22  0312 07/13/22  1050 07/14/22  0335 07/14/22  1106 07/14/22  2047 07/15/22  0341 07/15/22  1048   *  --  197*  --   --  263*  --    CALCIUM 9.0  --  9.6  --   --  9.4  --    ALBUMIN 2.8*  --  3.0*  --   --  2.9*  --    PROT 7.5  --  7.7  --   --  7.8  --    *  --  132*  --   --  132*  --    K 3.2*  3.2*   < > 3.6   < > 3.5 3.3* 4.4   CO2 31*  --  31*  --   --  30*  --    CL 89*  --  89*  --   --  89*  --    BUN 11  --  13  --   --  13  --    CREATININE 1.1  --  1.4  --   --  1.4  --    ALKPHOS 165*  --  162*  --   --  167*  --    ALT 51*  --  43  --   --  37  --    AST 37  --  34  --   --  32  --    BILITOT 0.9  --  1.0  --   --  1.0  --     < > = values in this interval not displayed.        Coagulation:   Recent Labs   Lab 07/13/22  0312 07/14/22  0333 07/15/22  0341   INR 2.5* 2.3* 2.1*   APTT 32.9* 32.2* 31.7       LDH:  Recent Labs   Lab 07/13/22 0312 07/14/22 0333 07/15/22  0341   * 448* 424*       Microbiology:  Microbiology Results (last 7 days)       ** No results found for the last 168  "hours. **            I have reviewed all pertinent labs within the past 24 hours.    Estimated Creatinine Clearance: 86.3 mL/min (based on SCr of 1.4 mg/dL).    Diagnostic Results:  I have reviewed and interpreted all pertinent imaging results/findings within the past 24 hours.    Assessment and Plan:     36 yo male with NIDCM with BiV systolic heart failure, on home Milrinone at 0.375 mcg/kg/min, [facility] ,was not evaluated for OHT as he has recently quit smoking in April 2022 but was approved for VAD with plan to begin OHT evaluation in upcoming months if Mr Queen is stable and suitable for OHT eval (blood group A), 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 Went to ED at [facility] 6/10 but left after waiting 4 hours. Had clinic appointment with us today, but arrived to [facility] early this morning and decided to go to the ED instead. Baseline Lasix dose is 80 mg bid. Reports taking 240 mg qd past 3 days with no improvement. BNP is 1701, up from 898 on 6/2 and 49 on 5/24. sCr is 1.8 with baseline ~ 1.5-1.7. sPO2 on RA is 93%. Wife at bedside    He has been given Lasix 80 mg IVP in the ED with plan to start Lasix gtt at 20 mg/hr           * Acute on chronic combined systolic and diastolic congestive heart failure, NYHA class 4  - NIDCM  - Was not evaluated for OHTx as he quit smoking in April 2022.   - Received HM3 on 6/29  - IV Lasix gtt 40 mg/hr   - Net -ve 3.1 L/24 hours  - SVO2 50, CO 6.5, CI 2.9, SVR 726on epi 0.03. Midodrine discontinued 6/11. Planning to wean down to .02 epi. And potentially wean off altogether tomorrow.   - ECHO 7/12- AV does not open, IVS midline, " moderate-severe reduced RVSF, LVEDD 5.02 with HM3 speed set to 5200 rpm (decreased from 5300 rpm the day prior)   - See LVAD    Tingling in extremities  - Pt reports paresthesias in right toes and fingers x 3 days   - Possible radiculopathy vs electrolyte abnormality vs neurovascular etiology   - Consulted General Neurology, suspect compression from surgical positioning/carpal tunnel syndrome. Recommended brace to L hand/wrist. If no improvement, will need outpatient EMG/NCS    Hyponatremia  -Hypervolemic, now improved  -Discontinued salt tablets   -Improvement in Na with diuresis    LVAD (left ventricular assist device) present  - S/P DT HM3 with MVR plus Protek Duo for RV support 6/29 (Grecia)   - RVAD removed with concern for hemolysis  - Marcella increased to 20 (now off),  off, remains on epi 0.02  - IV Lasix gtt increased to 40 mg/hr overnight with improved volume status - see ADHF   - HTS primary   - AC GASTON trial   - LDH stable  - Speed set at 5200 (increased to 5300 on 7/6 then decreased to 5200 rpm 7/11)  - ECHO 7/12: IVS midline, AV does not open, moderate-severe reduced RVSF, LVEDD 5.02 with HM3 speed set to 5200 rpm (decreased the day prior)   Procedure: Device Interrogation Including analysis of device parameters  Current Settings: Ventricular Assist Device  Review of device function is stable    TXP LVAD INTERROGATIONS 7/15/2022 7/15/2022 7/15/2022 7/15/2022 7/15/2022 7/15/2022 7/15/2022   Type HeartMate3 HeartMate3 HeartMate3 HeartMate3 HeartMate3 HeartMate3 HeartMate3   Flow 4.6 4.7 4.9 4.2 4.5 4.4 4.6   Speed 5200 5200 5200 5200 5200 5200 5200   PI 3.0 3.1 2.4 3.2 3.8 3.9 4.1   Power (Serna) 3.4 3.7 3.7 3.7 3.8 3.8 3.7   LSL 4800 4800 4800 4800 4800 4800 4800   Pulsatility Intermittent pulse Intermittent pulse Intermittent pulse Intermittent pulse Intermittent pulse Intermittent pulse Intermittent pulse       Staphylococcus epidermidis bacteremia  - Blood cultures 6/20 and repeat 6/21 positive for  Staph Epi. One of two blood cultures from 6/23 positive for Staph (taken prior to line exchange). Repeat cultures sent 6/25 NGTD  - IJ exchanged on 6/23, IABP exchanged 6/23  - ID recommended 14 day course of vancomycin from VAD implantation on 6/29 with end date: 7/12/22. Vancomycin discontinued per CTS with concerns for elevated sCr. ID with new recs to complete 7d course of daptomycin, which was completed 7/7/22    Uncontrolled diabetes mellitus  Admitted 5/24-5/27 with hyperglycemia hyperosmolar syndrome  - Hgb A1C 9.2 on 5/20/22  - Endocrine following, on insulin gtt    CKD (chronic kidney disease) stage 3, GFR 30-59 ml/min  SCr baseline ~ 1.5-1.7      Uninterrupted Critical Care/Counseling Time (not including procedures): 45 min.       Jeff Spencer PA-C  Heart Transplant  [facility] - Surgical Intensive Care

## 2022-07-15 NOTE — PROGRESS NOTES
07/15/2022  Casey Arizmendi    Current provider:  Josh Pulido MD    Device interrogation:  TXP LVAD INTERROGATIONS 7/15/2022 7/15/2022 7/15/2022 7/15/2022 7/15/2022 7/15/2022 7/15/2022   Type HeartMate3 HeartMate3 HeartMate3 HeartMate3 HeartMate3 HeartMate3 HeartMate3   Flow 4.9 4.2 4.5 4.4 4.6 4.4 4.5   Speed 5200 5200 5200 5200 5200 5200 5200   PI 2.4 3.2 3.8 3.9 4.1 3.3 3.4   Power (Serna) 3.7 3.7 3.8 3.8 3.7 3.7 3.7   LSL 4800 4800 4800 4800 4800 4800 4800   Pulsatility Intermittent pulse Intermittent pulse Intermittent pulse Intermittent pulse Intermittent pulse Intermittent pulse Pulse          Rounded on Kevan Queen to ensure all mechanical assist device settings (IABP or VAD) were appropriate and all parameters were within limits.  I was able to ensure all back up equipment was present, the staff had no issues, and the Perfusion Department daily rounding was complete.      For implantable VADs: Interrogation of Ventricular assist device was performed with analysis of device parameters and review of device function. I have personally reviewed the interrogation findings and agree with findings as stated.     In emergency, the nursing units have been notified to contact the perfusion department either by:  Calling [phone] from 630am to 4pm Mon thru Fri, utilizing the On-Call Finder functionality of Epic and searching for Perfusion, or by contacting the hospital  from 4pm to 630am and on weekends and asking to speak with the perfusionist on call.    11:21 AM

## 2022-07-15 NOTE — PROGRESS NOTES
Plan of care reviewed with patient and spouse, verbalizes understanding. AAOx4, VSS. MAPs>65, afebrile, 02 sats >99%. Patient up out of bed to chair, ambulated hallway x2, tolerated well. Pt and spouse educated on VAD dressing, site CDI. Pt tolerating diet, reports no N/V, no BM but having flatus. Pt reports pain well controlled. Plan is to continue to wean epi, CVPs down trending, WCTM.

## 2022-07-15 NOTE — PT/OT/SLP PROGRESS
Occupational Therapy   Treatment & POC update    Name: Kevan Queen  MRN: [id number]  Admitting Diagnosis:  Acute on chronic combined systolic and diastolic congestive heart failure, NYHA class 4  15 Days Post-Op    Recommendations:     Discharge Recommendations: home health OT  Discharge Equipment Recommendations:   (TBD)    Assessment:     Kevan Queen is a 37 y.o. male with a medical diagnosis of Acute on chronic combined systolic and diastolic congestive heart failure, NYHA class 4. Pt presents with good tolerance for therapy on this date and was motivated to participate in therapy. Pt tolerated a variety of tasks and positions with VSS and without s/s. Pt demonstrated increased knowledge of LVAD device and components; pt able to transfer from wall power to battery power with minimal assistance for component names. Given improved performance on this date, decrease POC for pt needs. Anticipate pt will progress well with continued OT services to address functional endurance and strength and will be ready for d/c home with significant other support when medically stable with addition of HH services.    Performance deficits affecting function are weakness, impaired endurance, impaired functional mobility, impaired self care skills, decreased upper extremity function, decreased lower extremity function.     Rehab Prognosis:  Good; patient would benefit from acute skilled OT services to address these deficits and reach maximum level of function.       Pt LVAD on wall power upon entry into pt room. Pt completed transfer from wall power to battery power with set-up assistance & supervision. Pt remained on battery power at end of session for gait and mobility training with PT.     Plan:     Patient to be seen 3 x/week to address the above listed problems via self-care/home management, therapeutic activities, therapeutic exercises  · Plan of Care Expires: 08/02/22  · Plan of Care Reviewed with: patient, significant  "other    Subjective   "I am tired but ready to get up and moving"  Pain/Comfort:  · Pain Rating 1: 0/10    Objective:     Communicated with: Nsg prior to session.  Patient found sitting edge of bed with arterial line, blood pressure cuff, LVAD, pulse ox (continuous), telemetry, PICC line upon OT entry to room.    General Precautions: Standard, fall, LVAD, sternal   Orthopedic Precautions:N/A   Respiratory Status: Room air     Occupational Performance:     Functional Mobility/Transfers:  · Patient completed Sit <> Stand Transfer with supervision  with  no assistive device   · Functional Mobility: Pt performed transfer from wall power to battery power seated EOB with Supervision for balance. Pt completed sit>stand with supervision from EOB. Pt completed functional mobility to bathroom with SBA for standing ADLs at sink with Supervision. Pt required 1 cue for standing break after sit>stand from EOB to ensure safety and balance prior to functional mobility.    Activities of Daily Living:  · Grooming: supervision for standing at sink to complete hand hygiene and oral care.Pt demonstrated B UE integration and sequencing without additional assistance to complete task.    · Pt performed LVAD transfer from wallpower to battery power with set-up assistance and supervision.      [facility] 6 Click ADL: 21    Treatment & Education:  Pt educated on OT POC, purpose of OT services and safety with ADLs and functional mobility.   Pt educated on:  - Importance and benefit of OOB activity  - Sternal precautions (pt verbalized and demonstrated 3/3 sternal precautions)  - Pt advocacy for care  - Energy Conservation techniques (taking rest breaks prn)    *LVAD education:  - Pt verbalized function and name for LVAD controller, power cables, drive line, clips, emergency bag, and consolidation bag. Pt requiring minimal assistance for correct names of LVAD components. Pt verbalized controller components and signals corresponding with components. " Pt verbalized components of emergency bag requiring min A for all components required.  - Pt currently has L consolidation bag but requires R for appropriate use. OT notified LVAD team who will provide correct accessory this afternoon.      Patient left standing with PT with all lines intact, call button in reach, nsg notified and significant other and PT presentEducation:      GOALS:   Multidisciplinary Problems     Occupational Therapy Goals        Problem: Occupational Therapy    Goal Priority Disciplines Outcome Interventions   Occupational Therapy Goal     OT, PT/OT Ongoing, Progressing    Description: Goals to be met by: 8/2/22     Patient will increase functional independence with ADLs by performing:    UE Dressing with Modified Firth.  LE Dressing with Modified Firth and Assistive Devices as needed.  Grooming while standing at sink with Modified Firth.  Toileting from toilet with Modified Firth for hygiene and clothing management.   Bathing from  shower chair/bench with Modified Firth.  Toilet transfer to toilet with Modified Firth.  Increased functional strength to WFL for B UE.  Upper extremity exercise program x15 reps per handout, with assistance as needed.  Pt will be I in all LVAD management.                     Time Tracking:     OT Date of Treatment: 07/15/22  OT Start Time: 1101  OT Stop Time: 1124  OT Total Time (min): 23 min    Billable Minutes:Self Care/Home Management 23               7/15/2022

## 2022-07-15 NOTE — PLAN OF CARE
Hemodynamics Care Update Note       SVO2: 51  CVP: 14   CO: 5.9  CI: 2.7  SVR: 840.6     Urine output 2.7 L since this AM and net -1.4 L today  (calculated using oxygen consumption constant of 3.5)       Plan:  No changes made   Case discussed with on call HTS attending.    Donn Baker, PGY4  Cardiovascular Disease  [facility]

## 2022-07-15 NOTE — PROGRESS NOTES
Pt aaox3 while sitting up in bed with simeon Bundy asleep at bedside.  LVAD history interrogated with no abnormal events noted.  LVAD numbers WNL. Approximately 20 minutes was spent with the patient providing education. Pt denies any needs at this time.  Patient educated on below information:   ALARMS: Reviewed all alarms with patient. Explained and demonstrated red heart, red battery, driveline disconnect, yellow wrench, and yellow kia alarms. Had patient trouble shoot different situations and provide the solutions to the alarms. Had patient explain what could be happening when the alarm sounds.   PAGING VS CALLING: Educated patient on Contact information sheet. Thoroughly explained the difference between Paging vs. Calling the coordinators. Patient practiced paging Coordinator.   DEVICE PARTS: Educated patient on Pieces of the LVAD device. Reviewed , modular cable, tether cable, batteries and clips, battery charger, and MPU.   ACTIVITIES: Reviewed with patient activities they may participate in including intimacy, driving, traveling, severe weather, and exercising. Also reviewed with patient what they should do in an emergency situation. Explained all precautions patient needs to take during these situation.  Pt verbalized understanding. Displays good recall of information.   Encouraged pt to notify nurse if they have any questions, problems or concerns for LVAD coordinator.  Pt verbalized understanding and in agreement of plan. Will follow up with pt soon.    PATIENT IS NOT CHECKED OFF ON ALARMS  CAREGIVER Page IS NOT CHECKED OFF ON DRESSING CHANGE

## 2022-07-15 NOTE — CARE UPDATE
BG goal 140-180    -A1C   Lab Results   Component Value Date    HGBA1C 9.2 (H) 05/20/2022         -HOME REGIMEN: Detemir  23  units daily and Aspart   12  units TIDWM and  Mod dose correction insulin    -INPATIENT REGIMEN: levemir 18 units daily and novolog 8 units with meals.     -GLUCOSE TREND FOR THE PAST 24HRS:     -NO HYPOGYCEMIAS NOTED     -TOLERATING 75% OF PO DIET     -Diet: Diet Card[facility]; Consistent Carbohydrate; Fluid - 1500mL        Remains in SICU, NAEON. Creatinine 1.4. BG variable this AM- likely ate overnight. LVAD. 15 Days Post-Op      Plan:  continue levemir 18 units daily.   continue novolog 10 units with meals.   BG monitoring ac/hs and moderate dose correction scale.     Discharge planning:tbd     Endocrine to continue to follow    ** Please call Endocrine for any BG related issues **

## 2022-07-15 NOTE — ASSESSMENT & PLAN NOTE
- NIDCM  - Was not evaluated for OHTx as he quit smoking in April 2022.   - Received HM3 on 6/29  - IV Lasix gtt 40 mg/hr   - Net -ve 3.1 L/24 hours  - SVO2 50, CO 6.5, CI 2.9, SVR 726on epi 0.03. Midodrine discontinued 6/11. Planning to wean down to .02 epi. And potentially wean off altogether tomorrow.   - ECHO 7/12- AV does not open, IVS midline, moderate-severe reduced RVSF, LVEDD 5.02 with HM3 speed set to 5200 rpm (decreased from 5300 rpm the day prior)   - See LVAD

## 2022-07-15 NOTE — PLAN OF CARE
POC reviewed with pt and significant other at bedside, VSS, no acute events overnight. CVP 16, SvO2 50%, down from from 51%. AM lab results reviewed, electrolytes replaced, no VAD alarms, speed 5200. All questions answered and addressed.

## 2022-07-15 NOTE — ASSESSMENT & PLAN NOTE
- S/P DT HM3 with MVR plus Protek Duo for RV support 6/29 (Grecia)   - RVAD removed with concern for hemolysis  - Marcella increased to 20 (now off),  off, remains on epi 0.02  - IV Lasix gtt increased to 40 mg/hr overnight with improved volume status - see ADHF   - HTS primary   - AC GASTON trial   - LDH stable  - Speed set at 5200 (increased to 5300 on 7/6 then decreased to 5200 rpm 7/11)  - ECHO 7/12: IVS midline, AV does not open, moderate-severe reduced RVSF, LVEDD 5.02 with HM3 speed set to 5200 rpm (decreased the day prior)   Procedure: Device Interrogation Including analysis of device parameters  Current Settings: Ventricular Assist Device  Review of device function is stable    TXP LVAD INTERROGATIONS 7/15/2022 7/15/2022 7/15/2022 7/15/2022 7/15/2022 7/15/2022 7/15/2022   Type HeartMate3 HeartMate3 HeartMate3 HeartMate3 HeartMate3 HeartMate3 HeartMate3   Flow 4.6 4.7 4.9 4.2 4.5 4.4 4.6   Speed 5200 5200 5200 5200 5200 5200 5200   PI 3.0 3.1 2.4 3.2 3.8 3.9 4.1   Power (Serna) 3.4 3.7 3.7 3.7 3.8 3.8 3.7   LSL 4800 4800 4800 4800 4800 4800 4800   Pulsatility Intermittent pulse Intermittent pulse Intermittent pulse Intermittent pulse Intermittent pulse Intermittent pulse Intermittent pulse

## 2022-07-15 NOTE — SUBJECTIVE & OBJECTIVE
Interval History: NAEON. No complaints. Diuresing well with IV Lasix gtt at 40 mg/hr. Net -ve 3.1 L/24 hours and CVP 12. SVO2 50%, CO 6.5, CI 2.9,  on epi 0.03. Going down slowly on his epi. Removing his Arterial line.     Continuous Infusions:   sodium chloride 0.9% 5 mL/hr at 06/27/22 0055    sodium chloride 0.9% Stopped (07/05/22 1314)    EPINEPHrine 0.02 mcg/kg/min (07/15/22 1400)    furosemide (LASIX) 2 mg/mL continuous infusion (non-titrating) 40 mg/hr (07/15/22 1400)     Scheduled Meds:   acetaminophen  650 mg Oral Q8H    busPIRone  7.5 mg Oral Daily    famotidine  40 mg Oral Daily    ferrous gluconate  324 mg Oral Daily with breakfast    gabapentin  100 mg Oral TID    insulin aspart U-100  10 Units Subcutaneous TIDWM    insulin detemir U-100  18 Units Subcutaneous QHS    INV aspirin/placebo  100 mg Oral Daily    LIDOcaine  1 patch Transdermal Q24H    polyethylene glycol  17 g Oral Daily    potassium chloride  40 mEq Oral TID    sodium chloride 0.9%  10 mL Intravenous Q6H    tiZANidine  2 mg Oral Q8H    traZODone  50 mg Oral QHS    [START ON 7/16/2022] warfarin  5 mg Oral Every Tues, Thurs, Sat, Sun    warfarin  7.5 mg Oral Every Mon, Wed, Fri     PRN Meds:albuterol sulfate, bisacodyL, dextrose 10%, dextrose 10%, docusate sodium, glucagon (human recombinant), glucose, glucose, insulin aspart U-100, magnesium sulfate IVPB, oxyCODONE, polyethylene glycol, sodium chloride 0.9%, sodium chloride 0.9%, Flushing PICC Protocol **AND** sodium chloride 0.9% **AND** sodium chloride 0.9%, traMADoL    Review of patient's allergies indicates:   Allergen Reactions    Aspirin Other (See Comments)     Mr. Thacker is enrolled in Dr. Paige's Alon Trial and cannot have any aspirin and/or aspirin-containing products. DO NOT cancel any orders for INV Aspirin 100 mg/Placebo. If you have questions, please contact Isabel @ [phone], [phone],[email or web address], secure chat or MS Teams message.    Bumex [bumetanide]  Hives    Lactose Diarrhea     Other reaction(s): Abdominal distension, gaseous    Torsemide Hives     Objective:     Vital Signs (Most Recent):  Temp: 98.6 °F (37 °C) (07/15/22 1100)  Pulse: 106 (07/15/22 1415)  Resp: 18 (07/15/22 1433)  BP: 122/85 (07/15/22 1245)  SpO2: 99 % (07/15/22 1300)   Vital Signs (24h Range):  Temp:  [98.6 °F (37 °C)-99.5 °F (37.5 °C)] 98.6 °F (37 °C)  Pulse:  [] 106  Resp:  [9-45] 18  SpO2:  [99 %-100 %] 99 %  BP: ()/(68-85) 122/85  Arterial Line BP: (65-88)/(53-80) 82/71     Patient Vitals for the past 72 hrs (Last 3 readings):   Weight   07/15/22 0500 91 kg (200 lb 9.9 oz)   07/14/22 0500 90 kg (198 lb 6.6 oz)   07/12/22 1603 105 kg (231 lb 7.7 oz)       Body mass index is 25.08 kg/m².      Intake/Output Summary (Last 24 hours) at 7/15/2022 1455  Last data filed at 7/15/2022 1400  Gross per 24 hour   Intake 2000.48 ml   Output 4380 ml   Net -2379.52 ml         Hemodynamic Parameters:       Telemetry: ST    Physical Exam  Vitals and nursing note reviewed.   Constitutional:       Appearance: Normal appearance.      Comments: Sitting in chair   HENT:      Head: Normocephalic and atraumatic.   Eyes:      Extraocular Movements: Extraocular movements intact.      Conjunctiva/sclera: Conjunctivae normal.   Cardiovascular:      Rate and Rhythm: Regular rhythm. Tachycardia present.      Comments: Smooth VAD hum  Pulmonary:      Breath sounds: Normal breath sounds.   Abdominal:      Palpations: Abdomen is soft.   Musculoskeletal:         General: No swelling.      Cervical back: Neck supple.      Right lower leg: No edema.      Left lower leg: No edema.   Skin:     General: Skin is dry.      Capillary Refill: Capillary refill takes 2 to 3 seconds.   Neurological:      General: No focal deficit present.      Mental Status: He is alert and oriented to person, place, and time.      Motor: No weakness.   Psychiatric:         Mood and Affect: Mood normal.         Behavior: Behavior normal.          Thought Content: Thought content normal.         Judgment: Judgment normal.       Significant Labs:  CBC:  Recent Labs   Lab 07/13/22  0312 07/14/22  0333 07/15/22  0341   WBC 12.08 10.91 10.55   RBC 2.71* 2.67* 2.85*   HGB 7.1* 6.9* 7.3*   HCT 22.8* 22.7* 23.4*    479* 485*   MCV 84 85 82   MCH 26.2* 25.8* 25.6*   MCHC 31.1* 30.4* 31.2*       BNP:  Recent Labs   Lab 07/11/22  0234 07/13/22 0312 07/14/22 0333   * 624* 458*       CMP:  Recent Labs   Lab 07/13/22 0312 07/13/22  1050 07/14/22 0335 07/14/22  1106 07/14/22  2047 07/15/22  0341 07/15/22  1048   *  --  197*  --   --  263*  --    CALCIUM 9.0  --  9.6  --   --  9.4  --    ALBUMIN 2.8*  --  3.0*  --   --  2.9*  --    PROT 7.5  --  7.7  --   --  7.8  --    *  --  132*  --   --  132*  --    K 3.2*  3.2*   < > 3.6   < > 3.5 3.3* 4.4   CO2 31*  --  31*  --   --  30*  --    CL 89*  --  89*  --   --  89*  --    BUN 11  --  13  --   --  13  --    CREATININE 1.1  --  1.4  --   --  1.4  --    ALKPHOS 165*  --  162*  --   --  167*  --    ALT 51*  --  43  --   --  37  --    AST 37  --  34  --   --  32  --    BILITOT 0.9  --  1.0  --   --  1.0  --     < > = values in this interval not displayed.        Coagulation:   Recent Labs   Lab 07/13/22  0312 07/14/22  0333 07/15/22  0341   INR 2.5* 2.3* 2.1*   APTT 32.9* 32.2* 31.7       LDH:  Recent Labs   Lab 07/13/22  0312 07/14/22  0333 07/15/22  0341   * 448* 424*       Microbiology:  Microbiology Results (last 7 days)       ** No results found for the last 168 hours. **            I have reviewed all pertinent labs within the past 24 hours.    Estimated Creatinine Clearance: 86.3 mL/min (based on SCr of 1.4 mg/dL).    Diagnostic Results:  I have reviewed and interpreted all pertinent imaging results/findings within the past 24 hours.

## 2022-07-16 LAB
ALBUMIN SERPL BCP-MCNC: 2.7 G/DL (ref 3.5–5.2)
ALLENS TEST: ABNORMAL
ALLENS TEST: ABNORMAL
ALP SERPL-CCNC: 156 U/L (ref 55–135)
ALT SERPL W/O P-5'-P-CCNC: 30 U/L (ref 10–44)
ANION GAP SERPL CALC-SCNC: 10 MMOL/L (ref 8–16)
APTT BLDCRRT: 32.4 SEC (ref 21–32)
AST SERPL-CCNC: 39 U/L (ref 10–40)
BASOPHILS # BLD AUTO: 0.04 K/UL (ref 0–0.2)
BASOPHILS NFR BLD: 0.4 % (ref 0–1.9)
BILIRUB SERPL-MCNC: 0.9 MG/DL (ref 0.1–1)
BUN SERPL-MCNC: 15 MG/DL (ref 6–20)
CALCIUM SERPL-MCNC: 9.5 MG/DL (ref 8.7–10.5)
CHLORIDE SERPL-SCNC: 91 MMOL/L (ref 95–110)
CO2 SERPL-SCNC: 27 MMOL/L (ref 23–29)
CREAT SERPL-MCNC: 1.6 MG/DL (ref 0.5–1.4)
DELSYS: ABNORMAL
DELSYS: ABNORMAL
DIFFERENTIAL METHOD: ABNORMAL
EOSINOPHIL # BLD AUTO: 0.1 K/UL (ref 0–0.5)
EOSINOPHIL NFR BLD: 1.2 % (ref 0–8)
ERYTHROCYTE [DISTWIDTH] IN BLOOD BY AUTOMATED COUNT: 18.2 % (ref 11.5–14.5)
EST. GFR  (AFRICAN AMERICAN): >60 ML/MIN/1.73 M^2
EST. GFR  (NON AFRICAN AMERICAN): 54.2 ML/MIN/1.73 M^2
FIBRINOGEN PPP-MCNC: 560 MG/DL (ref 182–400)
GLUCOSE SERPL-MCNC: 265 MG/DL (ref 70–110)
HCO3 UR-SCNC: 30.4 MMOL/L (ref 24–28)
HCO3 UR-SCNC: 32.1 MMOL/L (ref 24–28)
HCT VFR BLD AUTO: 22.8 % (ref 40–54)
HGB BLD-MCNC: 6.9 G/DL (ref 14–18)
IMM GRANULOCYTES # BLD AUTO: 0.04 K/UL (ref 0–0.04)
IMM GRANULOCYTES NFR BLD AUTO: 0.4 % (ref 0–0.5)
INR PPP: 2.4 (ref 0.8–1.2)
LDH SERPL L TO P-CCNC: 364 U/L (ref 110–260)
LYMPHOCYTES # BLD AUTO: 1.4 K/UL (ref 1–4.8)
LYMPHOCYTES NFR BLD: 12.9 % (ref 18–48)
MAGNESIUM SERPL-MCNC: 1.8 MG/DL (ref 1.6–2.6)
MAGNESIUM SERPL-MCNC: 2.1 MG/DL (ref 1.6–2.6)
MAGNESIUM SERPL-MCNC: 2.3 MG/DL (ref 1.6–2.6)
MCH RBC QN AUTO: 25.3 PG (ref 27–31)
MCHC RBC AUTO-ENTMCNC: 30.3 G/DL (ref 32–36)
MCV RBC AUTO: 84 FL (ref 82–98)
MONOCYTES # BLD AUTO: 0.8 K/UL (ref 0.3–1)
MONOCYTES NFR BLD: 7.4 % (ref 4–15)
NEUTROPHILS # BLD AUTO: 8.2 K/UL (ref 1.8–7.7)
NEUTROPHILS NFR BLD: 77.7 % (ref 38–73)
NRBC BLD-RTO: 0 /100 WBC
PCO2 BLDA: 47.8 MMHG (ref 35–45)
PCO2 BLDA: 48.7 MMHG (ref 35–45)
PH SMN: 7.41 [PH] (ref 7.35–7.45)
PH SMN: 7.43 [PH] (ref 7.35–7.45)
PLATELET # BLD AUTO: 434 K/UL (ref 150–450)
PMV BLD AUTO: 9.4 FL (ref 9.2–12.9)
PO2 BLDA: 31 MMHG (ref 40–60)
PO2 BLDA: 31 MMHG (ref 40–60)
POC BE: 6 MMOL/L
POC BE: 8 MMOL/L
POC SATURATED O2: 59 % (ref 95–100)
POC SATURATED O2: 61 % (ref 95–100)
POC TCO2: 32 MMOL/L (ref 24–29)
POC TCO2: 34 MMOL/L (ref 24–29)
POCT GLUCOSE: 114 MG/DL (ref 70–110)
POCT GLUCOSE: 169 MG/DL (ref 70–110)
POCT GLUCOSE: 224 MG/DL (ref 70–110)
POCT GLUCOSE: 81 MG/DL (ref 70–110)
POTASSIUM SERPL-SCNC: 3.3 MMOL/L (ref 3.5–5.1)
POTASSIUM SERPL-SCNC: 3.9 MMOL/L (ref 3.5–5.1)
POTASSIUM SERPL-SCNC: 4.1 MMOL/L (ref 3.5–5.1)
PROT SERPL-MCNC: 7.6 G/DL (ref 6–8.4)
PROTHROMBIN TIME: 23.6 SEC (ref 9–12.5)
RBC # BLD AUTO: 2.73 M/UL (ref 4.6–6.2)
SAMPLE: ABNORMAL
SAMPLE: ABNORMAL
SITE: ABNORMAL
SITE: ABNORMAL
SODIUM SERPL-SCNC: 128 MMOL/L (ref 136–145)
WBC # BLD AUTO: 10.57 K/UL (ref 3.9–12.7)

## 2022-07-16 PROCEDURE — 93750 INTERROGATION VAD IN PERSON: CPT | Mod: ,,, | Performed by: INTERNAL MEDICINE

## 2022-07-16 PROCEDURE — 25000003 PHARM REV CODE 250: Performed by: STUDENT IN AN ORGANIZED HEALTH CARE EDUCATION/TRAINING PROGRAM

## 2022-07-16 PROCEDURE — 94761 N-INVAS EAR/PLS OXIMETRY MLT: CPT

## 2022-07-16 PROCEDURE — 63600175 PHARM REV CODE 636 W HCPCS: Performed by: STUDENT IN AN ORGANIZED HEALTH CARE EDUCATION/TRAINING PROGRAM

## 2022-07-16 PROCEDURE — 83735 ASSAY OF MAGNESIUM: CPT | Performed by: PHYSICIAN ASSISTANT

## 2022-07-16 PROCEDURE — 25000003 PHARM REV CODE 250: Performed by: THORACIC SURGERY (CARDIOTHORACIC VASCULAR SURGERY)

## 2022-07-16 PROCEDURE — 25000003 PHARM REV CODE 250

## 2022-07-16 PROCEDURE — 83615 LACTATE (LD) (LDH) ENZYME: CPT | Performed by: THORACIC SURGERY (CARDIOTHORACIC VASCULAR SURGERY)

## 2022-07-16 PROCEDURE — 83735 ASSAY OF MAGNESIUM: CPT | Mod: 91 | Performed by: NURSE PRACTITIONER

## 2022-07-16 PROCEDURE — 85730 THROMBOPLASTIN TIME PARTIAL: CPT | Performed by: STUDENT IN AN ORGANIZED HEALTH CARE EDUCATION/TRAINING PROGRAM

## 2022-07-16 PROCEDURE — 85610 PROTHROMBIN TIME: CPT | Performed by: PHYSICIAN ASSISTANT

## 2022-07-16 PROCEDURE — 25000003 PHARM REV CODE 250: Performed by: ANESTHESIOLOGY

## 2022-07-16 PROCEDURE — 82803 BLOOD GASES ANY COMBINATION: CPT

## 2022-07-16 PROCEDURE — 20000000 HC ICU ROOM

## 2022-07-16 PROCEDURE — 80053 COMPREHEN METABOLIC PANEL: CPT

## 2022-07-16 PROCEDURE — 93750 PR INTERROGATE VENT ASSIST DEV, IN PERSON, W PHYSICIAN ANALYSIS: ICD-10-PCS | Mod: ,,, | Performed by: INTERNAL MEDICINE

## 2022-07-16 PROCEDURE — A4216 STERILE WATER/SALINE, 10 ML: HCPCS | Performed by: THORACIC SURGERY (CARDIOTHORACIC VASCULAR SURGERY)

## 2022-07-16 PROCEDURE — 25000003 PHARM REV CODE 250: Performed by: INTERNAL MEDICINE

## 2022-07-16 PROCEDURE — 85025 COMPLETE CBC W/AUTO DIFF WBC: CPT | Performed by: INTERNAL MEDICINE

## 2022-07-16 PROCEDURE — 99291 CRITICAL CARE FIRST HOUR: CPT | Mod: FS,,, | Performed by: NURSE PRACTITIONER

## 2022-07-16 PROCEDURE — 27000248 HC VAD-ADDITIONAL DAY

## 2022-07-16 PROCEDURE — 99291 PR CRITICAL CARE, E/M 30-74 MINUTES: ICD-10-PCS | Mod: FS,,, | Performed by: NURSE PRACTITIONER

## 2022-07-16 PROCEDURE — 25000003 PHARM REV CODE 250: Performed by: PHYSICIAN ASSISTANT

## 2022-07-16 PROCEDURE — 85384 FIBRINOGEN ACTIVITY: CPT | Performed by: STUDENT IN AN ORGANIZED HEALTH CARE EDUCATION/TRAINING PROGRAM

## 2022-07-16 PROCEDURE — 84132 ASSAY OF SERUM POTASSIUM: CPT | Mod: 91 | Performed by: NURSE PRACTITIONER

## 2022-07-16 PROCEDURE — 99900035 HC TECH TIME PER 15 MIN (STAT)

## 2022-07-16 RX ORDER — POTASSIUM CHLORIDE 29.8 MG/ML
40 INJECTION INTRAVENOUS ONCE
Status: COMPLETED | OUTPATIENT
Start: 2022-07-16 | End: 2022-07-16

## 2022-07-16 RX ORDER — POTASSIUM CHLORIDE 20 MEQ/1
20 TABLET, EXTENDED RELEASE ORAL ONCE
Status: COMPLETED | OUTPATIENT
Start: 2022-07-16 | End: 2022-07-16

## 2022-07-16 RX ADMIN — ACETAMINOPHEN 650 MG: 325 TABLET ORAL at 03:07

## 2022-07-16 RX ADMIN — ACETAMINOPHEN 650 MG: 325 TABLET ORAL at 09:07

## 2022-07-16 RX ADMIN — POTASSIUM CHLORIDE 40 MEQ: 29.8 INJECTION, SOLUTION INTRAVENOUS at 05:07

## 2022-07-16 RX ADMIN — FUROSEMIDE 40 MG/HR: 10 INJECTION, SOLUTION INTRAMUSCULAR; INTRAVENOUS at 10:07

## 2022-07-16 RX ADMIN — FUROSEMIDE 40 MG/HR: 10 INJECTION, SOLUTION INTRAMUSCULAR; INTRAVENOUS at 05:07

## 2022-07-16 RX ADMIN — POLYETHYLENE GLYCOL 3350 17 G: 17 POWDER, FOR SOLUTION ORAL at 09:07

## 2022-07-16 RX ADMIN — Medication 324 MG: at 09:07

## 2022-07-16 RX ADMIN — WARFARIN SODIUM 5 MG: 5 TABLET ORAL at 04:07

## 2022-07-16 RX ADMIN — GABAPENTIN 100 MG: 100 CAPSULE ORAL at 08:07

## 2022-07-16 RX ADMIN — GABAPENTIN 100 MG: 100 CAPSULE ORAL at 09:07

## 2022-07-16 RX ADMIN — TIZANIDINE 2 MG: 2 TABLET ORAL at 04:07

## 2022-07-16 RX ADMIN — INSULIN ASPART 2 UNITS: 100 INJECTION, SOLUTION INTRAVENOUS; SUBCUTANEOUS at 09:07

## 2022-07-16 RX ADMIN — MAGNESIUM SULFATE HEPTAHYDRATE 2 G: 40 INJECTION, SOLUTION INTRAVENOUS at 04:07

## 2022-07-16 RX ADMIN — Medication 10 ML: at 06:07

## 2022-07-16 RX ADMIN — TIZANIDINE 2 MG: 2 TABLET ORAL at 06:07

## 2022-07-16 RX ADMIN — FUROSEMIDE 40 MG/HR: 10 INJECTION, SOLUTION INTRAMUSCULAR; INTRAVENOUS at 08:07

## 2022-07-16 RX ADMIN — TIZANIDINE 2 MG: 2 TABLET ORAL at 09:07

## 2022-07-16 RX ADMIN — INSULIN DETEMIR 22 UNITS: 100 INJECTION, SOLUTION SUBCUTANEOUS at 08:07

## 2022-07-16 RX ADMIN — BUSPIRONE HYDROCHLORIDE 7.5 MG: 5 TABLET ORAL at 06:07

## 2022-07-16 RX ADMIN — POTASSIUM CHLORIDE 20 MEQ: 1500 TABLET, EXTENDED RELEASE ORAL at 06:07

## 2022-07-16 RX ADMIN — GABAPENTIN 100 MG: 100 CAPSULE ORAL at 04:07

## 2022-07-16 RX ADMIN — INSULIN ASPART 10 UNITS: 100 INJECTION, SOLUTION INTRAVENOUS; SUBCUTANEOUS at 09:07

## 2022-07-16 RX ADMIN — POTASSIUM CHLORIDE 40 MEQ: 20 TABLET, EXTENDED RELEASE ORAL at 04:07

## 2022-07-16 RX ADMIN — POTASSIUM CHLORIDE 40 MEQ: 20 TABLET, EXTENDED RELEASE ORAL at 08:07

## 2022-07-16 RX ADMIN — INSULIN ASPART 10 UNITS: 100 INJECTION, SOLUTION INTRAVENOUS; SUBCUTANEOUS at 12:07

## 2022-07-16 RX ADMIN — FUROSEMIDE 40 MG/HR: 10 INJECTION, SOLUTION INTRAMUSCULAR; INTRAVENOUS at 02:07

## 2022-07-16 RX ADMIN — INSULIN ASPART 4 UNITS: 100 INJECTION, SOLUTION INTRAVENOUS; SUBCUTANEOUS at 04:07

## 2022-07-16 RX ADMIN — FAMOTIDINE 40 MG: 20 TABLET ORAL at 09:07

## 2022-07-16 RX ADMIN — TRAZODONE HYDROCHLORIDE 50 MG: 50 TABLET ORAL at 08:07

## 2022-07-16 RX ADMIN — POTASSIUM CHLORIDE 40 MEQ: 20 TABLET, EXTENDED RELEASE ORAL at 09:07

## 2022-07-16 RX ADMIN — ACETAMINOPHEN 650 MG: 325 TABLET ORAL at 06:07

## 2022-07-16 RX ADMIN — Medication 10 ML: at 12:07

## 2022-07-16 RX ADMIN — Medication 10 ML: at 04:07

## 2022-07-16 RX ADMIN — FUROSEMIDE 40 MG/HR: 10 INJECTION, SOLUTION INTRAMUSCULAR; INTRAVENOUS at 01:07

## 2022-07-16 RX ADMIN — INSULIN ASPART 10 UNITS: 100 INJECTION, SOLUTION INTRAVENOUS; SUBCUTANEOUS at 04:07

## 2022-07-16 NOTE — PROGRESS NOTES
[facility] - Surgical Intensive Care  Heart Transplant  Progress Note  Patient Name: Kevan Queen  MRN: [id number]  Admission Date: 6/13/2022  Hospital Length of Stay: 33 days  Attending Physician: Josh Pulido MD  Primary Care Provider: ORALIA Cline  Principal Problem:LVAD (left ventricular assist device) present    Subjective:     Interval History: NAEON. No complaints. Epi weaned to 0.01 overnight.     Continuous Infusions:   sodium chloride 0.9% 5 mL/hr at 06/27/22 0055    sodium chloride 0.9% Stopped (07/05/22 1314)    EPINEPHrine 0.02 mcg/kg/min (07/15/22 1500)    furosemide (LASIX) 2 mg/mL continuous infusion (non-titrating) 40 mg/hr (07/15/22 1649)     Scheduled Meds:   acetaminophen  650 mg Oral Q8H    busPIRone  7.5 mg Oral Daily    famotidine  40 mg Oral Daily    ferrous gluconate  324 mg Oral Daily with breakfast    gabapentin  100 mg Oral TID    insulin aspart U-100  10 Units Subcutaneous TIDWM    insulin detemir U-100  18 Units Subcutaneous QHS    INV aspirin/placebo  100 mg Oral Daily    LIDOcaine  1 patch Transdermal Q24H    polyethylene glycol  17 g Oral Daily    potassium chloride  40 mEq Oral TID    sodium chloride 0.9%  10 mL Intravenous Q6H    tiZANidine  2 mg Oral Q8H    traZODone  50 mg Oral QHS    [START ON 7/16/2022] warfarin  5 mg Oral Every Tues, Thurs, Sat, Sun    warfarin  7.5 mg Oral Every Mon, Wed, Fri     PRN Meds:albuterol sulfate, bisacodyL, dextrose 10%, dextrose 10%, docusate sodium, glucagon (human recombinant), glucose, glucose, insulin aspart U-100, magnesium sulfate IVPB, oxyCODONE, polyethylene glycol, sodium chloride 0.9%, sodium chloride 0.9%, Flushing PICC Protocol **AND** sodium chloride 0.9% **AND** sodium chloride 0.9%, traMADoL    Review of patient's allergies indicates:   Allergen Reactions    Aspirin Other (See Comments)     Mr. Thacker is enrolled in Dr. Paige's Alon Trial and cannot have any aspirin and/or aspirin-containing  products. DO NOT cancel any orders for INV Aspirin 100 mg/Placebo. If you have questions, please contact Isabel @ [phone], [phone],[email or web address], secure chat or MS Teams message.    Bumex [bumetanide] Hives    Lactose Diarrhea     Other reaction(s): Abdominal distension, gaseous    Torsemide Hives     Objective:     Vital Signs (Most Recent):  Temp: 98.6 °F (37 °C) (07/15/22 1500)  Pulse: 103 (07/15/22 1700)  Resp: (!) 9 (07/15/22 1700)  BP: (!) 98/54 (07/15/22 1600)  SpO2: 99 % (07/15/22 1600)   Vital Signs (24h Range):  Temp:  [98.6 °F (37 °C)-99.5 °F (37.5 °C)] 98.6 °F (37 °C)  Pulse:  [] 103  Resp:  [9-45] 9  SpO2:  [99 %-100 %] 99 %  BP: ()/(54-85) 98/54  Arterial Line BP: (69-88)/(53-79) 82/71     Patient Vitals for the past 72 hrs (Last 3 readings):   Weight   07/15/22 0500 91 kg (200 lb 9.9 oz)   07/14/22 0500 90 kg (198 lb 6.6 oz)       Body mass index is 25.08 kg/m².      Intake/Output Summary (Last 24 hours) at 7/15/2022 1708  Last data filed at 7/15/2022 1500  Gross per 24 hour   Intake 1710.64 ml   Output 4030 ml   Net -2319.36 ml       Hemodynamic Parameters: CVP 12       Telemetry: ST    Physical Exam  Vitals and nursing note reviewed.   Constitutional:       Appearance: Normal appearance.      Comments: Sitting in chair   HENT:      Head: Normocephalic and atraumatic.   Eyes:      Extraocular Movements: Extraocular movements intact.      Conjunctiva/sclera: Conjunctivae normal.   Cardiovascular:      Rate and Rhythm: Regular rhythm. Tachycardia present.      Comments: Smooth VAD hum  Pulmonary:      Breath sounds: Normal breath sounds.   Abdominal:      Palpations: Abdomen is soft.   Musculoskeletal:         General: No swelling.      Cervical back: Neck supple.      Right lower leg: No edema.      Left lower leg: No edema.   Skin:     General: Skin is dry.      Capillary Refill: Capillary refill takes 2 to 3 seconds.   Neurological:      General: No focal deficit  present.      Mental Status: He is alert and oriented to person, place, and time.      Motor: No weakness.   Psychiatric:         Mood and Affect: Mood normal.         Behavior: Behavior normal.         Thought Content: Thought content normal.         Judgment: Judgment normal.       Significant Labs:  CBC:  Recent Labs   Lab 07/13/22 0312 07/14/22 0333 07/15/22  0341   WBC 12.08 10.91 10.55   RBC 2.71* 2.67* 2.85*   HGB 7.1* 6.9* 7.3*   HCT 22.8* 22.7* 23.4*    479* 485*   MCV 84 85 82   MCH 26.2* 25.8* 25.6*   MCHC 31.1* 30.4* 31.2*       BNP:  Recent Labs   Lab 07/11/22  0234 07/13/22 0312 07/14/22  0333   * 624* 458*       CMP:  Recent Labs   Lab 07/13/22 0312 07/13/22  1050 07/14/22  0335 07/14/22  1106 07/15/22  0341 07/15/22  1048 07/15/22  1603   *  --  197*  --  263*  --   --    CALCIUM 9.0  --  9.6  --  9.4  --   --    ALBUMIN 2.8*  --  3.0*  --  2.9*  --   --    PROT 7.5  --  7.7  --  7.8  --   --    *  --  132*  --  132*  --   --    K 3.2*  3.2*   < > 3.6   < > 3.3* 4.4 3.3*   CO2 31*  --  31*  --  30*  --   --    CL 89*  --  89*  --  89*  --   --    BUN 11  --  13  --  13  --   --    CREATININE 1.1  --  1.4  --  1.4  --   --    ALKPHOS 165*  --  162*  --  167*  --   --    ALT 51*  --  43  --  37  --   --    AST 37  --  34  --  32  --   --    BILITOT 0.9  --  1.0  --  1.0  --   --     < > = values in this interval not displayed.        Coagulation:   Recent Labs   Lab 07/13/22  0312 07/14/22  0333 07/15/22  0341   INR 2.5* 2.3* 2.1*   APTT 32.9* 32.2* 31.7       LDH:  Recent Labs   Lab 07/13/22  0312 07/14/22  0333 07/15/22  0341   * 448* 424*       Microbiology:  Microbiology Results (last 7 days)       ** No results found for the last 168 hours. **            I have reviewed all pertinent labs within the past 24 hours.    Estimated Creatinine Clearance: 86.3 mL/min (based on SCr of 1.4 mg/dL).    Diagnostic Results:  I have reviewed and interpreted all pertinent  "imaging results/findings within the past 24 hours.    Assessment and Plan:     36 yo male with NIDCM with BiV systolic heart failure, on home Milrinone at 0.375 mcg/kg/min, [facility]/22 ,was not evaluated for OHT as he has recently quit smoking in April 2022 but was approved for VAD with plan to begin OHT evaluation in upcoming months if Mr Queen is stable and suitable for OHT eval (blood group A), 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 Went to ED at [facility] 6/10 but left after waiting 4 hours. Had clinic appointment with us today, but arrived to [facility] early this morning and decided to go to the ED instead. Baseline Lasix dose is 80 mg bid. Reports taking 240 mg qd past 3 days with no improvement. BNP is 1701, up from 898 on 6/2 and 49 on 5/24. sCr is 1.8 with baseline ~ 1.5-1.7. sPO2 on RA is 93%. Wife at bedside    He has been given Lasix 80 mg IVP in the ED with plan to start Lasix gtt at 20 mg/hr         * LVAD (left ventricular assist device) present  - S/P DT HM3 with MVR plus Protek Duo for RV support 6/29 (Grecia)  - RVAD removed with concern for hemolysis.  - HTS primary   - GASTON trial   - INR therapeutic. Cont warfarin.   - LDH stable  - CVP 12, ScVO2 59. Cont IV Lasix gtt 40 mg/hr. Stop epi(currently @ 0.01).    - ECHO 7/12- AV does not open, IVS midline, moderate-severe reduced RVSF, LVEDD 5.02 with HM3 speed set to 5200 rpm (decreased from 5300 rpm the day prior)   Procedure: Device Interrogation Including analysis of device parameters  Current Settings: Ventricular Assist Device  Review of device function is stable    TXP LVAD INTERROGATIONS 7/16/2022 " 7/16/2022 7/16/2022 7/16/2022 7/16/2022 7/16/2022 7/16/2022   Type HeartMate3 HeartMate3 HeartMate3 HeartMate3 HeartMate3 HeartMate3 HeartMate3   Flow 4.6 4.6 4.6 4.7 4.7 4.7 4.7   Speed 5200 5200 5200 5200 5200 5200 5200   PI 2.9 3.3 3.3 3 3.2 3.1 2.8   Power (Serna) 3.6 3.7 3.7 3.7 3.6 3.7 3.6   LSL 4800 4800 4800 4800 4800 4800 4800   Pulsatility Intermittent pulse Intermittent pulse Intermittent pulse Intermittent pulse Intermittent pulse Intermittent pulse Intermittent pulse       Tingling in extremities  - Pt reports paresthesias in right toes and fingers x 3 days   - Possible radiculopathy vs electrolyte abnormality vs neurovascular etiology   - Consulted General Neurology, suspect compression from surgical positioning/carpal tunnel syndrome. Recommended brace to L hand/wrist. If no improvement, will need outpatient EMG/NCS    Hyponatremia  -Hypervolemic, now improved  -Discontinued salt tablets   -Improvement in Na with diuresis    Staphylococcus epidermidis bacteremia  - Blood cultures 6/20 and repeat 6/21 positive for Staph Epi. One of two blood cultures from 6/23 positive for Staph (taken prior to line exchange). Repeat cultures sent 6/25 NGTD  - IJ exchanged on 6/23, IABP exchanged 6/23  - ID recommended 14 day course of vancomycin from VAD implantation on 6/29 with end date: 7/12/22. Vancomycin discontinued per CTS with concerns for elevated sCr. ID with new recs to complete 7d course of daptomycin, which was completed 7/7/22    Uncontrolled diabetes mellitus  Admitted 5/24-5/27 with hyperglycemia hyperosmolar syndrome  - Hgb A1C 9.2 on 5/20/22  - Endocrine following, on insulin gtt    CKD (chronic kidney disease) stage 3, GFR 30-59 ml/min  SCr baseline ~ 1.5-1.7    Acute on chronic combined systolic and diastolic congestive heart failure, NYHA class 4  - NIDCM.  - Was not evaluated for OHTx as he quit smoking in April 2022.   - See LVAD    Uninterrupted Critical Care/Counseling Time (not including  procedures): 60mn  Fausto Reyes, NP  Heart Transplant  [facility] - Surgical Intensive Care

## 2022-07-16 NOTE — PLAN OF CARE
POC reviewed with pt and significant other at bedside, VSS, SvO2 59%. AM lab results reviewed, electrolytes replaced, no VAD alarms (PI events noted), speed 5200. All questions answered and addressed.

## 2022-07-16 NOTE — NURSING
"      SICU PLAN OF CARE NOTE    Dx: LVAD (left ventricular assist device) present    Shift Events: Epi turned off    Goals of Care: MAP > 60, stepdown when appropriate    Neuro: AAO x4, Follows Commands and Moves All Extremities    Vital Signs: BP (!) 89/65 (BP Location: Left arm, Patient Position: Lying)   Pulse 94   Temp 98.1 °F (36.7 °C) (Axillary)   Resp 19   Ht 6' 3" (1.905 m)   Wt 92 kg (202 lb 13.2 oz)   SpO2 100%   BMI 25.35 kg/m²     Respiratory: Room Air    Diet: Cardiac Diet and Diabetic Diet    Gtts: Lasix    Urine Output: Voids Spontaneously 1365/shift      VAD rpm 5200; no alarms, see flowsheet for documentation       Labs/Accuchecks: Q12 K & Mag, ACHS accucheks     Skin: in tact, no breakdown, moves and repositions independently & sits up on edge of bed, specialty bed in use        "

## 2022-07-16 NOTE — PLAN OF CARE
Hemodynamics Care Update Note       SVO2: 61  CVP: 12  CO: 5.1  CI: 2.3  SVR: 831.4     MAP 65    On Epi 0.01 and lasix 40/hr  (calculated using oxygen consumption constant of 3.5)       Plan:  No changes made   Case discussed with on call HTS attending.    Donn Baker, PGY4  Cardiovascular Disease  [facility]

## 2022-07-16 NOTE — PROGRESS NOTES
07/16/2022  John Alaniz    Current provider:  Josh Pulido MD    Device interrogation:  TXP LVAD INTERROGATIONS 7/16/2022 7/16/2022 7/16/2022 7/16/2022 7/16/2022 7/16/2022 7/16/2022   Type HeartMate3 HeartMate3 HeartMate3 HeartMate3 HeartMate3 HeartMate3 HeartMate3   Flow 3.9 3.9 3.8 4.1 4.6 4.6 4.6   Speed 5200 5200 5200 5200 5200 5200 5200   PI 2.8 3.9 3.4 2.7 2.9 3.3 3.3   Power (Serna) 3.6 3.4 3.5 3.3 3.6 3.7 3.7   LSL 4800 4800 4800 4800 4800 4800 4800   Pulsatility Intermittent pulse Intermittent pulse Intermittent pulse Intermittent pulse Intermittent pulse Intermittent pulse Intermittent pulse          Rounded on Kevan Queen to ensure all mechanical assist device settings (IABP or VAD) were appropriate and all parameters were within limits.  I was able to ensure all back up equipment was present, the staff had no issues, and the Perfusion Department daily rounding was complete.      For implantable VADs: Interrogation of Ventricular assist device was performed with analysis of device parameters and review of device function. I have personally reviewed the interrogation findings and agree with findings as stated.     In emergency, the nursing units have been notified to contact the perfusion department either by:  Calling [phone] from 630am to 4pm Mon thru Fri, utilizing the On-Call Finder functionality of Epic and searching for Perfusion, or by contacting the hospital  from 4pm to 630am and on weekends and asking to speak with the perfusionist on call.    2:30 PM

## 2022-07-16 NOTE — PLAN OF CARE
Hemodynamics Care Update Note       SVO2: 39  CVP: 14  CO: 5.2  CI: 3.2  SVR: 846.2     MAP 69    On Epi 0.02 and lasix 40/hr  (calculated using oxygen consumption constant of 3.5)       Plan:  Epi titrated down to 0.01- will repeat hemodynamics at midnight   Case discussed with on call HTS attending.    Donn Baker, PGY4  Cardiovascular Disease  [facility]

## 2022-07-16 NOTE — PROGRESS NOTES
Dr. Pop notified of PI 2.0, as low as 1.7, previously 2.8-3.6, flow 4.6-4.0, speed 5200, Doppler pressure 68/0, nonpulsatile. Pt was sitting on the edge of the bed for 30-40 mins prior. Denies chest pain, LVAD hum smooth, no alarms audible, alarms reviewed, multiple PI events noted 10-15 mins prior. WCTM at this time and send AM labs.

## 2022-07-16 NOTE — ASSESSMENT & PLAN NOTE
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  - HTS primary   - GASTON trial   - INR therapeutic. Cont warfarin.   - LDH stable  - CVP 12, ScVO2 59. Cont IV Lasix gtt 40 mg/hr. Stop epi(currently @ 0.01).    - ECHO 7/12- AV does not open, IVS midline, moderate-severe reduced RVSF, LVEDD 5.02 with HM3 speed set to 5200 rpm (decreased from 5300 rpm the day prior)   Procedure: Device Interrogation Including analysis of device parameters  Current Settings: Ventricular Assist Device  Review of device function is stable    TXP LVAD INTERROGATIONS 7/16/2022 7/16/2022 7/16/2022 7/16/2022 7/16/2022 7/16/2022 7/16/2022   Type HeartMate3 HeartMate3 HeartMate3 HeartMate3 HeartMate3 HeartMate3 HeartMate3   Flow 4.6 4.6 4.6 4.7 4.7 4.7 4.7   Speed 5200 5200 5200 5200 5200 5200 5200   PI 2.9 3.3 3.3 3 3.2 3.1 2.8   Power (Serna) 3.6 3.7 3.7 3.7 3.6 3.7 3.6   LSL 4800 4800 4800 4800 4800 4800 4800   Pulsatility Intermittent pulse Intermittent pulse Intermittent pulse Intermittent pulse Intermittent pulse Intermittent pulse Intermittent pulse

## 2022-07-16 NOTE — CARE UPDATE
BG goal 140-180    -A1C   Lab Results   Component Value Date    HGBA1C 9.2 (H) 05/20/2022         -HOME REGIMEN: Detemir  23  units daily and Aspart   12  units TIDWM and  Mod dose correction insulin    -INPATIENT REGIMEN: levemir 18 units daily and novolog 8 units with meals.     -GLUCOSE TREND FOR THE PAST 24HRS: 169-339    -NO HYPOGYCEMIAS NOTED     -TOLERATING 75% OF PO DIET     -Diet: [facility]; Consistent Carbohydrate; Fluid - 1500mL        Remains in SICU, NAEON. Creatinine 1.6. BG remains variable- likely related to holding insulin at lunch. LVAD. 16 Days Post-Op      Plan:  continue levemir 18 units daily.   continue novolog 10 units with meals.   BG monitoring ac/hs and moderate dose correction scale.     Discharge planning:tbd     Endocrine to continue to follow    ** Please call Endocrine for any BG related issues **

## 2022-07-17 LAB
ALBUMIN SERPL BCP-MCNC: 2.8 G/DL (ref 3.5–5.2)
ALLENS TEST: ABNORMAL
ALLENS TEST: ABNORMAL
ALP SERPL-CCNC: 157 U/L (ref 55–135)
ALT SERPL W/O P-5'-P-CCNC: 26 U/L (ref 10–44)
ANION GAP SERPL CALC-SCNC: 11 MMOL/L (ref 8–16)
APTT BLDCRRT: 33.9 SEC (ref 21–32)
AST SERPL-CCNC: 35 U/L (ref 10–40)
BASOPHILS # BLD AUTO: 0.03 K/UL (ref 0–0.2)
BASOPHILS NFR BLD: 0.3 % (ref 0–1.9)
BILIRUB SERPL-MCNC: 1.1 MG/DL (ref 0.1–1)
BUN SERPL-MCNC: 18 MG/DL (ref 6–20)
CALCIUM SERPL-MCNC: 9.4 MG/DL (ref 8.7–10.5)
CHLORIDE SERPL-SCNC: 94 MMOL/L (ref 95–110)
CO2 SERPL-SCNC: 25 MMOL/L (ref 23–29)
CREAT SERPL-MCNC: 1.8 MG/DL (ref 0.5–1.4)
DELSYS: ABNORMAL
DELSYS: ABNORMAL
DIFFERENTIAL METHOD: ABNORMAL
EOSINOPHIL # BLD AUTO: 0.1 K/UL (ref 0–0.5)
EOSINOPHIL NFR BLD: 1.6 % (ref 0–8)
ERYTHROCYTE [DISTWIDTH] IN BLOOD BY AUTOMATED COUNT: 18.2 % (ref 11.5–14.5)
EST. GFR  (AFRICAN AMERICAN): 54.4 ML/MIN/1.73 M^2
EST. GFR  (NON AFRICAN AMERICAN): 47 ML/MIN/1.73 M^2
FIBRINOGEN PPP-MCNC: 560 MG/DL (ref 182–400)
FIO2: 21
GLUCOSE SERPL-MCNC: 189 MG/DL (ref 70–110)
HCO3 UR-SCNC: 27.2 MMOL/L (ref 24–28)
HCO3 UR-SCNC: 27.7 MMOL/L (ref 24–28)
HCT VFR BLD AUTO: 24 % (ref 40–54)
HGB BLD-MCNC: 7.2 G/DL (ref 14–18)
IMM GRANULOCYTES # BLD AUTO: 0.03 K/UL (ref 0–0.04)
IMM GRANULOCYTES NFR BLD AUTO: 0.3 % (ref 0–0.5)
INR PPP: 3.1 (ref 0.8–1.2)
LDH SERPL L TO P-CCNC: 332 U/L (ref 110–260)
LYMPHOCYTES # BLD AUTO: 1.5 K/UL (ref 1–4.8)
LYMPHOCYTES NFR BLD: 17.1 % (ref 18–48)
MAGNESIUM SERPL-MCNC: 2 MG/DL (ref 1.6–2.6)
MAGNESIUM SERPL-MCNC: 2 MG/DL (ref 1.6–2.6)
MCH RBC QN AUTO: 25.4 PG (ref 27–31)
MCHC RBC AUTO-ENTMCNC: 30 G/DL (ref 32–36)
MCV RBC AUTO: 85 FL (ref 82–98)
MODE: ABNORMAL
MONOCYTES # BLD AUTO: 0.6 K/UL (ref 0.3–1)
MONOCYTES NFR BLD: 7.1 % (ref 4–15)
NEUTROPHILS # BLD AUTO: 6.4 K/UL (ref 1.8–7.7)
NEUTROPHILS NFR BLD: 73.6 % (ref 38–73)
NRBC BLD-RTO: 0 /100 WBC
PCO2 BLDA: 45.1 MMHG (ref 35–45)
PCO2 BLDA: 46.6 MMHG (ref 35–45)
PH SMN: 7.38 [PH] (ref 7.35–7.45)
PH SMN: 7.39 [PH] (ref 7.35–7.45)
PHOSPHATE SERPL-MCNC: 4.1 MG/DL (ref 2.7–4.5)
PLATELET # BLD AUTO: 433 K/UL (ref 150–450)
PMV BLD AUTO: 9.1 FL (ref 9.2–12.9)
PO2 BLDA: 26 MMHG (ref 40–60)
PO2 BLDA: 27 MMHG (ref 40–60)
POC BE: 2 MMOL/L
POC BE: 3 MMOL/L
POC SATURATED O2: 46 % (ref 95–100)
POC SATURATED O2: 47 % (ref 95–100)
POC TCO2: 29 MMOL/L (ref 24–29)
POC TCO2: 29 MMOL/L (ref 24–29)
POCT GLUCOSE: 113 MG/DL (ref 70–110)
POCT GLUCOSE: 124 MG/DL (ref 70–110)
POCT GLUCOSE: 134 MG/DL (ref 70–110)
POCT GLUCOSE: 207 MG/DL (ref 70–110)
POCT GLUCOSE: 212 MG/DL (ref 70–110)
POTASSIUM SERPL-SCNC: 3.4 MMOL/L (ref 3.5–5.1)
POTASSIUM SERPL-SCNC: 4.1 MMOL/L (ref 3.5–5.1)
PREALB SERPL-MCNC: 12 MG/DL (ref 20–43)
PROT SERPL-MCNC: 7.5 G/DL (ref 6–8.4)
PROTHROMBIN TIME: 30.6 SEC (ref 9–12.5)
RBC # BLD AUTO: 2.84 M/UL (ref 4.6–6.2)
SAMPLE: ABNORMAL
SAMPLE: ABNORMAL
SITE: ABNORMAL
SITE: ABNORMAL
SODIUM SERPL-SCNC: 130 MMOL/L (ref 136–145)
SP02: 95
WBC # BLD AUTO: 8.7 K/UL (ref 3.9–12.7)

## 2022-07-17 PROCEDURE — 63600175 PHARM REV CODE 636 W HCPCS: Performed by: STUDENT IN AN ORGANIZED HEALTH CARE EDUCATION/TRAINING PROGRAM

## 2022-07-17 PROCEDURE — 99232 SBSQ HOSP IP/OBS MODERATE 35: CPT | Mod: ,,, | Performed by: NURSE PRACTITIONER

## 2022-07-17 PROCEDURE — 25000003 PHARM REV CODE 250

## 2022-07-17 PROCEDURE — 63600175 PHARM REV CODE 636 W HCPCS: Performed by: NURSE PRACTITIONER

## 2022-07-17 PROCEDURE — 85610 PROTHROMBIN TIME: CPT | Performed by: PHYSICIAN ASSISTANT

## 2022-07-17 PROCEDURE — 93750 PR INTERROGATE VENT ASSIST DEV, IN PERSON, W PHYSICIAN ANALYSIS: ICD-10-PCS | Mod: ,,, | Performed by: INTERNAL MEDICINE

## 2022-07-17 PROCEDURE — 85730 THROMBOPLASTIN TIME PARTIAL: CPT | Performed by: STUDENT IN AN ORGANIZED HEALTH CARE EDUCATION/TRAINING PROGRAM

## 2022-07-17 PROCEDURE — 84132 ASSAY OF SERUM POTASSIUM: CPT | Performed by: INTERNAL MEDICINE

## 2022-07-17 PROCEDURE — 25000003 PHARM REV CODE 250: Performed by: NURSE PRACTITIONER

## 2022-07-17 PROCEDURE — 25000003 PHARM REV CODE 250: Performed by: ANESTHESIOLOGY

## 2022-07-17 PROCEDURE — 82803 BLOOD GASES ANY COMBINATION: CPT

## 2022-07-17 PROCEDURE — 25000003 PHARM REV CODE 250: Performed by: INTERNAL MEDICINE

## 2022-07-17 PROCEDURE — 80053 COMPREHEN METABOLIC PANEL: CPT

## 2022-07-17 PROCEDURE — 93750 INTERROGATION VAD IN PERSON: CPT | Mod: ,,, | Performed by: INTERNAL MEDICINE

## 2022-07-17 PROCEDURE — 85384 FIBRINOGEN ACTIVITY: CPT | Performed by: STUDENT IN AN ORGANIZED HEALTH CARE EDUCATION/TRAINING PROGRAM

## 2022-07-17 PROCEDURE — A4216 STERILE WATER/SALINE, 10 ML: HCPCS | Performed by: THORACIC SURGERY (CARDIOTHORACIC VASCULAR SURGERY)

## 2022-07-17 PROCEDURE — 83615 LACTATE (LD) (LDH) ENZYME: CPT | Performed by: THORACIC SURGERY (CARDIOTHORACIC VASCULAR SURGERY)

## 2022-07-17 PROCEDURE — 84134 ASSAY OF PREALBUMIN: CPT | Performed by: STUDENT IN AN ORGANIZED HEALTH CARE EDUCATION/TRAINING PROGRAM

## 2022-07-17 PROCEDURE — 99900035 HC TECH TIME PER 15 MIN (STAT)

## 2022-07-17 PROCEDURE — 25000003 PHARM REV CODE 250: Performed by: THORACIC SURGERY (CARDIOTHORACIC VASCULAR SURGERY)

## 2022-07-17 PROCEDURE — 25000003 PHARM REV CODE 250: Performed by: STUDENT IN AN ORGANIZED HEALTH CARE EDUCATION/TRAINING PROGRAM

## 2022-07-17 PROCEDURE — 94799 UNLISTED PULMONARY SVC/PX: CPT

## 2022-07-17 PROCEDURE — 25000003 PHARM REV CODE 250: Performed by: PHYSICIAN ASSISTANT

## 2022-07-17 PROCEDURE — 99232 PR SUBSEQUENT HOSPITAL CARE,LEVL II: ICD-10-PCS | Mod: ,,, | Performed by: NURSE PRACTITIONER

## 2022-07-17 PROCEDURE — 83735 ASSAY OF MAGNESIUM: CPT | Performed by: INTERNAL MEDICINE

## 2022-07-17 PROCEDURE — 84100 ASSAY OF PHOSPHORUS: CPT | Performed by: THORACIC SURGERY (CARDIOTHORACIC VASCULAR SURGERY)

## 2022-07-17 PROCEDURE — 27000248 HC VAD-ADDITIONAL DAY

## 2022-07-17 PROCEDURE — 20000000 HC ICU ROOM

## 2022-07-17 PROCEDURE — 94761 N-INVAS EAR/PLS OXIMETRY MLT: CPT

## 2022-07-17 PROCEDURE — 85025 COMPLETE CBC W/AUTO DIFF WBC: CPT | Performed by: INTERNAL MEDICINE

## 2022-07-17 RX ORDER — POTASSIUM CHLORIDE 29.8 MG/ML
40 INJECTION INTRAVENOUS ONCE
Status: COMPLETED | OUTPATIENT
Start: 2022-07-17 | End: 2022-07-17

## 2022-07-17 RX ORDER — WARFARIN 2.5 MG/1
2.5 TABLET ORAL ONCE
Status: DISCONTINUED | OUTPATIENT
Start: 2022-07-17 | End: 2022-07-17

## 2022-07-17 RX ORDER — WARFARIN 1 MG/1
1 TABLET ORAL ONCE
Status: COMPLETED | OUTPATIENT
Start: 2022-07-17 | End: 2022-07-17

## 2022-07-17 RX ADMIN — Medication 10 ML: at 12:07

## 2022-07-17 RX ADMIN — FAMOTIDINE 40 MG: 20 TABLET ORAL at 08:07

## 2022-07-17 RX ADMIN — BUSPIRONE HYDROCHLORIDE 7.5 MG: 5 TABLET ORAL at 06:07

## 2022-07-17 RX ADMIN — Medication 10 ML: at 06:07

## 2022-07-17 RX ADMIN — FUROSEMIDE 30 MG/HR: 10 INJECTION, SOLUTION INTRAMUSCULAR; INTRAVENOUS at 03:07

## 2022-07-17 RX ADMIN — Medication 324 MG: at 08:07

## 2022-07-17 RX ADMIN — GABAPENTIN 100 MG: 100 CAPSULE ORAL at 08:07

## 2022-07-17 RX ADMIN — INSULIN ASPART 10 UNITS: 100 INJECTION, SOLUTION INTRAVENOUS; SUBCUTANEOUS at 11:07

## 2022-07-17 RX ADMIN — GABAPENTIN 100 MG: 100 CAPSULE ORAL at 03:07

## 2022-07-17 RX ADMIN — OXYCODONE HYDROCHLORIDE 10 MG: 10 TABLET ORAL at 04:07

## 2022-07-17 RX ADMIN — Medication 10 ML: at 11:07

## 2022-07-17 RX ADMIN — INSULIN ASPART 4 UNITS: 100 INJECTION, SOLUTION INTRAVENOUS; SUBCUTANEOUS at 04:07

## 2022-07-17 RX ADMIN — ACETAMINOPHEN 650 MG: 325 TABLET ORAL at 06:07

## 2022-07-17 RX ADMIN — INSULIN ASPART 10 UNITS: 100 INJECTION, SOLUTION INTRAVENOUS; SUBCUTANEOUS at 04:07

## 2022-07-17 RX ADMIN — WARFARIN SODIUM 1 MG: 1 TABLET ORAL at 05:07

## 2022-07-17 RX ADMIN — FUROSEMIDE 30 MG/HR: 10 INJECTION, SOLUTION INTRAMUSCULAR; INTRAVENOUS at 11:07

## 2022-07-17 RX ADMIN — TIZANIDINE 2 MG: 2 TABLET ORAL at 02:07

## 2022-07-17 RX ADMIN — POTASSIUM CHLORIDE 40 MEQ: 20 TABLET, EXTENDED RELEASE ORAL at 08:07

## 2022-07-17 RX ADMIN — POTASSIUM CHLORIDE 40 MEQ: 29.8 INJECTION, SOLUTION INTRAVENOUS at 06:07

## 2022-07-17 RX ADMIN — ACETAMINOPHEN 650 MG: 325 TABLET ORAL at 09:07

## 2022-07-17 RX ADMIN — INSULIN ASPART 2 UNITS: 100 INJECTION, SOLUTION INTRAVENOUS; SUBCUTANEOUS at 03:07

## 2022-07-17 RX ADMIN — INSULIN ASPART 10 UNITS: 100 INJECTION, SOLUTION INTRAVENOUS; SUBCUTANEOUS at 07:07

## 2022-07-17 RX ADMIN — POTASSIUM CHLORIDE 40 MEQ: 20 TABLET, EXTENDED RELEASE ORAL at 03:07

## 2022-07-17 RX ADMIN — TIZANIDINE 2 MG: 2 TABLET ORAL at 06:07

## 2022-07-17 RX ADMIN — TRAZODONE HYDROCHLORIDE 50 MG: 50 TABLET ORAL at 08:07

## 2022-07-17 RX ADMIN — OXYCODONE HYDROCHLORIDE 10 MG: 10 TABLET ORAL at 10:07

## 2022-07-17 RX ADMIN — Medication 10 ML: at 05:07

## 2022-07-17 RX ADMIN — FUROSEMIDE 40 MG/HR: 10 INJECTION, SOLUTION INTRAMUSCULAR; INTRAVENOUS at 04:07

## 2022-07-17 RX ADMIN — ACETAMINOPHEN 650 MG: 325 TABLET ORAL at 02:07

## 2022-07-17 RX ADMIN — FUROSEMIDE 40 MG/HR: 10 INJECTION, SOLUTION INTRAMUSCULAR; INTRAVENOUS at 09:07

## 2022-07-17 RX ADMIN — TIZANIDINE 2 MG: 2 TABLET ORAL at 09:07

## 2022-07-17 RX ADMIN — INSULIN DETEMIR 22 UNITS: 100 INJECTION, SOLUTION SUBCUTANEOUS at 08:07

## 2022-07-17 RX ADMIN — POLYETHYLENE GLYCOL 3350 17 G: 17 POWDER, FOR SOLUTION ORAL at 08:07

## 2022-07-17 NOTE — PROGRESS NOTES
[facility] - [facility]  Heart Transplant  Progress Note  Patient Name: Kevan Queen  MRN: [id number]  Admission Date: 6/13/2022  Hospital Length of Stay: 34 days  Attending Physician: Josh Pulido MD  Primary Care Provider: ORALIA Cline  Principal Problem:LVAD (left ventricular assist device) present    Subjective:     Interval History: NAEON. No complaints.     Continuous Infusions:   sodium chloride 0.9% 5 mL/hr at 06/27/22 0055    sodium chloride 0.9% 5 mL/hr at 07/17/22 0600    furosemide (LASIX) 2 mg/mL continuous infusion (non-titrating) 40 mg/hr (07/17/22 0906)     Scheduled Meds:   acetaminophen  650 mg Oral Q8H    busPIRone  7.5 mg Oral Daily    famotidine  40 mg Oral Daily    ferrous gluconate  324 mg Oral Daily with breakfast    gabapentin  100 mg Oral TID    insulin aspart U-100  10 Units Subcutaneous TIDWM    insulin detemir U-100  22 Units Subcutaneous QHS    INV aspirin/placebo  100 mg Oral Daily    LIDOcaine  1 patch Transdermal Q24H    polyethylene glycol  17 g Oral Daily    potassium chloride  40 mEq Oral TID    sodium chloride 0.9%  10 mL Intravenous Q6H    tiZANidine  2 mg Oral Q8H    traZODone  50 mg Oral QHS    warfarin  5 mg Oral Every Tues, Thurs, Sat, Sun    warfarin  7.5 mg Oral Every Mon, Wed, Fri     PRN Meds:albuterol sulfate, bisacodyL, dextrose 10%, dextrose 10%, docusate sodium, glucagon (human recombinant), glucose, glucose, insulin aspart U-100, magnesium sulfate IVPB, oxyCODONE, polyethylene glycol, sodium chloride 0.9%, sodium chloride 0.9%, Flushing PICC Protocol **AND** sodium chloride 0.9% **AND** sodium chloride 0.9%, traMADoL    Review of patient's allergies indicates:   Allergen Reactions    Aspirin Other (See Comments)     Mr. Thacker is enrolled in Dr. Paige's Alon Trial and cannot have any aspirin and/or aspirin-containing products. DO NOT cancel any orders for INV Aspirin 100 mg/Placebo. If you have questions, please  contact Isabel @ [phone], [phone],[email or web address], secure chat or MS Teams message.    Bumex [bumetanide] Hives    Lactose Diarrhea     Other reaction(s): Abdominal distension, gaseous    Torsemide Hives     Objective:     Vital Signs (Most Recent):  Temp: 98.5 °F (36.9 °C) (07/17/22 0700)  Pulse: 87 (07/17/22 0900)  Resp: 12 (07/17/22 0900)  BP: (!) 85/62 (07/17/22 0800)  SpO2: 99 % (07/17/22 0805)   Vital Signs (24h Range):  Temp:  [98.1 °F (36.7 °C)-98.5 °F (36.9 °C)] 98.5 °F (36.9 °C)  Pulse:  [] 87  Resp:  [11-27] 12  SpO2:  [99 %-100 %] 99 %  BP: ()/(0-91) 85/62     Patient Vitals for the past 72 hrs (Last 3 readings):   Weight   07/17/22 0426 88 kg (194 lb 0.1 oz)   07/16/22 0402 92 kg (202 lb 13.2 oz)   07/15/22 0500 91 kg (200 lb 9.9 oz)       Body mass index is 24.25 kg/m².      Intake/Output Summary (Last 24 hours) at 7/17/2022 0947  Last data filed at 7/17/2022 0700  Gross per 24 hour   Intake 1277.76 ml   Output 3690 ml   Net -2412.24 ml       Hemodynamic Parameters: CVP 10       Telemetry: ST    Physical Exam  Vitals and nursing note reviewed.   Constitutional:       Appearance: Normal appearance.   HENT:      Head: Normocephalic and atraumatic.   Eyes:      Extraocular Movements: Extraocular movements intact.      Conjunctiva/sclera: Conjunctivae normal.   Cardiovascular:      Rate and Rhythm: Regular rhythm. Tachycardia present.      Comments: Smooth VAD hum  Pulmonary:      Breath sounds: Normal breath sounds.   Abdominal:      Palpations: Abdomen is soft.   Musculoskeletal:         General: No swelling.      Cervical back: Neck supple.      Right lower leg: No edema.      Left lower leg: No edema.   Skin:     General: Skin is dry.      Capillary Refill: Capillary refill takes 2 to 3 seconds.   Neurological:      General: No focal deficit present.      Mental Status: He is alert and oriented to person, place, and time.      Motor: No weakness.   Psychiatric:         Mood  and Affect: Mood normal.         Behavior: Behavior normal.         Thought Content: Thought content normal.         Judgment: Judgment normal.       Significant Labs:  CBC:  Recent Labs   Lab 07/15/22  0341 07/16/22  0250 07/17/22  0317   WBC 10.55 10.57 8.70   RBC 2.85* 2.73* 2.84*   HGB 7.3* 6.9* 7.2*   HCT 23.4* 22.8* 24.0*   * 434 433   MCV 82 84 85   MCH 25.6* 25.3* 25.4*   MCHC 31.2* 30.3* 30.0*       BNP:  Recent Labs   Lab 07/11/22  0234 07/13/22  0312 07/14/22  0333   * 624* 458*       CMP:  Recent Labs   Lab 07/15/22  0341 07/15/22  1048 07/16/22 0250 07/16/22  1612 07/16/22  2204 07/17/22 0317   *  --  265*  --   --  189*   CALCIUM 9.4  --  9.5  --   --  9.4   ALBUMIN 2.9*  --  2.7*  --   --  2.8*   PROT 7.8  --  7.6  --   --  7.5   *  --  128*  --   --  130*   K 3.3*   < > 3.9 3.3* 4.1 3.4*   CO2 30*  --  27  --   --  25   CL 89*  --  91*  --   --  94*   BUN 13  --  15  --   --  18   CREATININE 1.4  --  1.6*  --   --  1.8*   ALKPHOS 167*  --  156*  --   --  157*   ALT 37  --  30  --   --  26   AST 32  --  39  --   --  35   BILITOT 1.0  --  0.9  --   --  1.1*    < > = values in this interval not displayed.        Coagulation:   Recent Labs   Lab 07/15/22  0341 07/16/22  0250 07/17/22  0317   INR 2.1* 2.4* 3.1*   APTT 31.7 32.4* 33.9*       LDH:  Recent Labs   Lab 07/15/22  0341 07/16/22  0250 07/17/22  0317   * 364* 332*       Microbiology:  Microbiology Results (last 7 days)       ** No results found for the last 168 hours. **            I have reviewed all pertinent labs within the past 24 hours.    Estimated Creatinine Clearance: 67.2 mL/min (A) (based on SCr of 1.8 mg/dL (H)).    Diagnostic Results:  I have reviewed and interpreted all pertinent imaging results/findings within the past 24 hours.    Assessment and Plan:     38 yo male with NIDCM with BiV systolic heart failure, on home Milrinone at 0.375 mcg/kg/min, presented at [facility] 4/2/22 ,was not evaluated  "for OHT as he has recently quit smoking in April 2022 but was approved for VAD with plan to begin OHT evaluation in upcoming months if Mr Queen is stable and suitable for OHT eval (blood group A), 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 Went to ED at [facility] 6/10 but left after waiting 4 hours. Had clinic appointment with us today, but arrived to [facility] early this morning and decided to go to the ED instead. Baseline Lasix dose is 80 mg bid. Reports taking 240 mg qd past 3 days with no improvement. BNP is 1701, up from 898 on 6/2 and 49 on 5/24. sCr is 1.8 with baseline ~ 1.5-1.7. sPO2 on RA is 93%. Wife at bedside    He has been given Lasix 80 mg IVP in the ED with plan to start Lasix gtt at 20 mg/hr           * LVAD (left ventricular assist device) present  - S/P DT HM3 with MVR plus Protek Duo for RV support 6/29 (Grecia)  - RVAD removed with concern for hemolysis.  - HTS primary   - GASTON trial   - INR therapeutic. Cont warfarin.   - LDH stable  - CVP 10. Cr rising. Will decrease IV Lasix gtt to 30 mg/hr.   - ECHO 7/12- AV does not open, IVS midline, moderate-severe reduced RVSF, LVEDD 5.02 with HM3 speed set to 5200 rpm (decreased from 5300 rpm the day prior)     Procedure: Device Interrogation Including analysis of device parameters  Current Settings: Ventricular Assist Device  Review of device function is stable    TXP LVAD INTERROGATIONS 7/17/2022 7/17/2022 7/17/2022 7/17/2022 7/17/2022 7/17/2022 7/17/2022   Type HeartMate3 HeartMate3 HeartMate3 HeartMate3 HeartMate3 HeartMate3 HeartMate3   Flow 4.4 4.0 4.7 4.6 4.5 4.7 4.4   Speed 5200 5200 5200 5200 5200 5200 5200   PI 3.2 3.2 2.8 " 3.1 3.2 2.9 3.5   Power (Serna) 3.7 3.5 3.6 3.7 3.7 3.7 3.7   LSL 4800 4800 4800 - - - 4800   Pulsatility Intermittent pulse Intermittent pulse Intermittent pulse Intermittent pulse Intermittent pulse Intermittent pulse Intermittent pulse       Tingling in extremities  - Pt reports paresthesias in right toes and fingers x 3 days   - Possible radiculopathy vs electrolyte abnormality vs neurovascular etiology   - Consulted General Neurology, suspect compression from surgical positioning/carpal tunnel syndrome. Recommended brace to L hand/wrist. If no improvement, will need outpatient EMG/NCS    Hyponatremia  -Hypervolemic, now improved  -Discontinued salt tablets   -Improvement in Na with diuresis    Staphylococcus epidermidis bacteremia  - Blood cultures 6/20 and repeat 6/21 positive for Staph Epi. One of two blood cultures from 6/23 positive for Staph (taken prior to line exchange). Repeat cultures sent 6/25 NGTD  - IJ exchanged on 6/23, IABP exchanged 6/23  - ID recommended 14 day course of vancomycin from VAD implantation on 6/29 with end date: 7/12/22. Vancomycin discontinued per CTS with concerns for elevated sCr. ID with new recs to complete 7d course of daptomycin, which was completed 7/7/22    Uncontrolled diabetes mellitus  Admitted 5/24-5/27 with hyperglycemia hyperosmolar syndrome  - Hgb A1C 9.2 on 5/20/22  - Endocrine following, on insulin gtt    CKD (chronic kidney disease) stage 3, GFR 30-59 ml/min  SCr baseline ~ 1.5-1.7    Acute on chronic combined systolic and diastolic congestive heart failure, NYHA class 4  - NIDCM.  - Was not evaluated for OHTx as he quit smoking in April 2022.   - See LVAD      Fausto Reyes NP  Heart Transplant  [facility] - [facility] Intensive [facility]

## 2022-07-17 NOTE — PLAN OF CARE
"      SICU PLAN OF CARE NOTE    Dx: LVAD (left ventricular assist device) present    Shift Events: stepdown orders, bowel movement, decreased lasix drip    Goals of Care: stepdown, map > 60    Neuro: AAO x4, Follows Commands, and Moves All Extremities    Vital Signs: BP (!) 58/0 (BP Location: Left arm, Patient Position: Lying)   Pulse 88   Temp 98 °F (36.7 °C) (Axillary)   Resp (!) 23   Ht 6' 3" (1.905 m)   Wt 88 kg (194 lb 0.1 oz)   SpO2 99%   BMI 24.25 kg/m²     Cardiac: NSR to sinus tach    Respiratory: Room Air    Diet: Cardiac Diet and Diabetic Diet    Gtts: Lasix    Urine Output: Voids Spontaneously 1000 cc/shift      VAD RPM 5200, no alarms, see flowsheet        Labs/Accuchecks: accuchek ACHS, Q12 K & Mag    Skin: No breakdown, intact, midsternal incision, right abdominal LVAD driveline       "

## 2022-07-17 NOTE — PLAN OF CARE
Hemodynamics Care Update Note     SVO2: 59  CVP: 11  CO: 8.6  CI: 3.8  SVR: 888  MAP 75     No changes made       Griffin Noe, PGY4   [facility]

## 2022-07-17 NOTE — ASSESSMENT & PLAN NOTE
Endocrinology consulted for BG management.   BG goal 140-180    continue levemir 22 units HS.   - continue Novolog 10 units TIDWM and prn for BG excursions MDC (150/25) SSI.  - BG monitoring ac/hs and moderate dose correction scale.   - Hypoglycemia protocol in place.     ** Please notify Endocrine for any change and/or advance in diet**  ** Please call Endocrine for any BG related issues **    Discharge Planning:   TBD. Please notify endocrinology prior to discharge.

## 2022-07-17 NOTE — PROGRESS NOTES
"[facility] - [facility] Intensive Care  Endocrinology  Progress Note    Admit Date: 6/13/2022     Reason for Consult: Management of  type 2 DM, Hyperglycemia     Surgical Procedure and Date: none    Diabetes diagnosis year: 4/21/21    Home Diabetes Medications:  Detemir  23  units daily and Aspart   12  units TIDWM and  Mod dose correction insulin    Lab Results   Component Value Date    HGBA1C 9.2 (H) 05/20/2022           How often checking glucose at home? 1-3 x day   BG readings on regimen: 180- up to 300 once  Hypoglycemia on the regimen?  yes once- related to not really eating after taking aspart   Missed doses on regimen?  no    Diabetes Complications include:     Hyperglycemia    Complicating diabetes co morbidities:   History of MI, CHF, and CKD      HPI:   Patient is a 37 y.o. male with a diagnosis of type 2 DM and NIDCM with BiV systolic heart failure. Patient was presented at [facility] 4/2/22  and was  approved for VAD. Also recently admitted with [facility]; he had clinic appointment last week to f/u recent admit for hyperglycemic hyperosmolar syndrome but did not come as he was "feeling too bad" presents to  ED with SOB. Endocrinology consulted for BG/ DM management.                Interval HPI:   Overnight events: remains in ICU. BG reasonably well controlled with scheduled insulin and prn correction scale. LVAD. 17 Days Post-Op  Eating: Diet Cardiac [facility]; Consistent Carbohydrate; Fluid - 1500mL    50%  Nausea: No  Hypoglycemia and intervention: No  Fever: No  TPN and/or TF: No      BP (!) 84/61 (BP Location: Left arm, Patient Position: Lying)   Pulse 93   Temp 98.5 °F (36.9 °C) (Oral)   Resp 12   Ht 6' 3" (1.905 m)   Wt 88 kg (194 lb 0.1 oz)   SpO2 99%   BMI 24.25 kg/m²     Labs Reviewed and Include    Recent Labs   Lab 07/17/22  0317   *   CALCIUM 9.4   ALBUMIN 2.8*   PROT 7.5   *   K 3.4*   CO2 25   CL 94*   BUN 18   CREATININE 1.8*   ALKPHOS 157*   ALT 26   AST 35   BILITOT " 1.1*     Lab Results   Component Value Date    WBC 8.70 07/17/2022    HGB 7.2 (L) 07/17/2022    HCT 24.0 (L) 07/17/2022    MCV 85 07/17/2022     07/17/2022     No results for input(s): TSH, FREET4 in the last 168 hours.  Lab Results   Component Value Date    HGBA1C 9.2 (H) 05/20/2022       Nutritional status:   Body mass index is 24.25 kg/m².  Lab Results   Component Value Date    ALBUMIN 2.8 (L) 07/17/2022    ALBUMIN 2.7 (L) 07/16/2022    ALBUMIN 2.9 (L) 07/15/2022     Lab Results   Component Value Date    PREALBUMIN 12 (L) 07/17/2022    PREALBUMIN 12 (L) 07/14/2022    PREALBUMIN 12 (L) 07/07/2022       Estimated Creatinine Clearance: 67.2 mL/min (A) (based on SCr of 1.8 mg/dL (H)).    Accu-Checks  Recent Labs     07/14/22  2051 07/15/22  0745 07/15/22  1142 07/15/22  1511 07/15/22  2026 07/16/22  0850 07/16/22  1212 07/16/22  1609 07/16/22  2047 07/17/22  0316   POCTGLUCOSE 171* 360* 132* 90 339* 169* 114* 224* 81 212*       Current Medications and/or Treatments Impacting Glycemic Control  Immunotherapy:    Immunosuppressants       None          Steroids:   Hormones (From admission, onward)                None          Pressors:    Autonomic Drugs (From admission, onward)                None          Hyperglycemia/Diabetes Medications:   Antihyperglycemics (From admission, onward)                Start     Stop Route Frequency Ordered    07/16/22 2100  insulin detemir U-100 pen 22 Units         -- SubQ Nightly 07/16/22 0636    07/13/22 1130  insulin aspart U-100 pen 10 Units         -- SubQ 3 times daily with meals 07/13/22 1106    07/10/22 1718  insulin aspart U-100 pen 1-10 Units         -- SubQ Before meals & nightly PRN 07/10/22 1618            ASSESSMENT and PLAN    * LVAD (left ventricular assist device) present  Managed per primary team  Avoid hypoglycemia        Uncontrolled diabetes mellitus  Endocrinology consulted for BG management.   BG goal 140-180    continue levemir 22 units HS.   - continue  Novolog 10 units TIDWM and prn for BG excursions [facility] (150/25) SSI.  - BG monitoring ac/hs and moderate dose correction scale.   - Hypoglycemia protocol in place.     ** Please notify Endocrine for any change and/or advance in diet**  ** Please call Endocrine for any BG related issues **    Discharge Planning:   TBD. Please notify endocrinology prior to discharge.        Acute on chronic combined systolic and diastolic congestive heart failure, NYHA class 4  Optimize glucose control and  avoid hypoglycemia    Managed per primary.       Surgical wound present  Optimize BG for surgical wound healing.         CKD (chronic kidney disease) stage 3, GFR 30-59 ml/min    Titrate insulin slowly to avoid hypoglycemia as the risk of hypoglycemia increases with decreased creatinine clearance.    Estimated Creatinine Clearance: 75.6 mL/min (A) (based on SCr of 1.6 mg/dL (H)).          Yumiko Fowler, NP  Endocrinology  [facility] - [facility] Intensive [facility]

## 2022-07-17 NOTE — ASSESSMENT & PLAN NOTE
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  - HTS primary   - GASTON trial   - INR therapeutic. Cont warfarin.   - LDH stable  - CVP 10. Cr rising. Will decrease IV Lasix gtt to 30 mg/hr.   - ECHO 7/12- AV does not open, IVS midline, moderate-severe reduced RVSF, LVEDD 5.02 with HM3 speed set to 5200 rpm (decreased from 5300 rpm the day prior)     Procedure: Device Interrogation Including analysis of device parameters  Current Settings: Ventricular Assist Device  Review of device function is stable    TXP LVAD INTERROGATIONS 7/17/2022 7/17/2022 7/17/2022 7/17/2022 7/17/2022 7/17/2022 7/17/2022   Type HeartMate3 HeartMate3 HeartMate3 HeartMate3 HeartMate3 HeartMate3 HeartMate3   Flow 4.4 4.0 4.7 4.6 4.5 4.7 4.4   Speed 5200 5200 5200 5200 5200 5200 5200   PI 3.2 3.2 2.8 3.1 3.2 2.9 3.5   Power (Serna) 3.7 3.5 3.6 3.7 3.7 3.7 3.7   LSL 4800 4800 4800 - - - 4800   Pulsatility Intermittent pulse Intermittent pulse Intermittent pulse Intermittent pulse Intermittent pulse Intermittent pulse Intermittent pulse

## 2022-07-17 NOTE — PROGRESS NOTES
07/17/2022  John Alaniz    Current provider:  Josh Pulido MD    Device interrogation:  TXP LVAD INTERROGATIONS 7/17/2022 7/17/2022 7/17/2022 7/17/2022 7/17/2022 7/17/2022 7/17/2022   Type HeartMate3 HeartMate3 HeartMate3 HeartMate3 HeartMate3 HeartMate3 HeartMate3   Flow 4.1 4.1 3.9 4.2 4.6 4.1 4.7   Speed 5250 5200 5200 5200 5200 5200 5200   PI 2.7 2.8 2.7 2.5 2.4 2.5 2.1   Power (Serna) 3.6 3.7 3.7 3.6 3.6 3.7 3.7   LSL 4800 4800 4800 4800 4800 4800 4800   Pulsatility Intermittent pulse Intermittent pulse Intermittent pulse Intermittent pulse Intermittent pulse Intermittent pulse Intermittent pulse          Rounded on Kevan Queen to ensure all mechanical assist device settings (IABP or VAD) were appropriate and all parameters were within limits.  I was able to ensure all back up equipment was present, the staff had no issues, and the Perfusion Department daily rounding was complete.      For implantable VADs: Interrogation of Ventricular assist device was performed with analysis of device parameters and review of device function. I have personally reviewed the interrogation findings and agree with findings as stated.     In emergency, the nursing units have been notified to contact the perfusion department either by:  Calling [phone] from 630am to 4pm Mon thru Fri, utilizing the On-Call Finder functionality of Epic and searching for Perfusion, or by contacting the hospital  from 4pm to 630am and on weekends and asking to speak with the perfusionist on call.    5:09 PM

## 2022-07-17 NOTE — SUBJECTIVE & OBJECTIVE
Interval History: NAEON. No complaints.     Continuous Infusions:   sodium chloride 0.9% 5 mL/hr at 06/27/22 0055    sodium chloride 0.9% 5 mL/hr at 07/17/22 0600    furosemide (LASIX) 2 mg/mL continuous infusion (non-titrating) 40 mg/hr (07/17/22 0906)     Scheduled Meds:   acetaminophen  650 mg Oral Q8H    busPIRone  7.5 mg Oral Daily    famotidine  40 mg Oral Daily    ferrous gluconate  324 mg Oral Daily with breakfast    gabapentin  100 mg Oral TID    insulin aspart U-100  10 Units Subcutaneous TIDWM    insulin detemir U-100  22 Units Subcutaneous QHS    INV aspirin/placebo  100 mg Oral Daily    LIDOcaine  1 patch Transdermal Q24H    polyethylene glycol  17 g Oral Daily    potassium chloride  40 mEq Oral TID    sodium chloride 0.9%  10 mL Intravenous Q6H    tiZANidine  2 mg Oral Q8H    traZODone  50 mg Oral QHS    warfarin  5 mg Oral Every Tues, Thurs, Sat, Sun    warfarin  7.5 mg Oral Every Mon, Wed, Fri     PRN Meds:albuterol sulfate, bisacodyL, dextrose 10%, dextrose 10%, docusate sodium, glucagon (human recombinant), glucose, glucose, insulin aspart U-100, magnesium sulfate IVPB, oxyCODONE, polyethylene glycol, sodium chloride 0.9%, sodium chloride 0.9%, Flushing PICC Protocol **AND** sodium chloride 0.9% **AND** sodium chloride 0.9%, traMADoL    Review of patient's allergies indicates:   Allergen Reactions    Aspirin Other (See Comments)     Mr. Thacker is enrolled in Dr. Paige's Alon Trial and cannot have any aspirin and/or aspirin-containing products. DO NOT cancel any orders for INV Aspirin 100 mg/Placebo. If you have questions, please contact Isabel @ [phone], [phone],[email or web address], secure chat or MS Teams moises.    Bumex [bumetanide] Hives    Lactose Diarrhea     Other reaction(s): Abdominal distension, gaseous    Torsemide Hives     Objective:     Vital Signs (Most Recent):  Temp: 98.5 °F (36.9 °C) (07/17/22 0700)  Pulse: 87 (07/17/22 0900)  Resp: 12 (07/17/22 0900)  BP: (!) 85/62  (07/17/22 0800)  SpO2: 99 % (07/17/22 0805)   Vital Signs (24h Range):  Temp:  [98.1 °F (36.7 °C)-98.5 °F (36.9 °C)] 98.5 °F (36.9 °C)  Pulse:  [] 87  Resp:  [11-27] 12  SpO2:  [99 %-100 %] 99 %  BP: ()/(0-91) 85/62     Patient Vitals for the past 72 hrs (Last 3 readings):   Weight   07/17/22 0426 88 kg (194 lb 0.1 oz)   07/16/22 0402 92 kg (202 lb 13.2 oz)   07/15/22 0500 91 kg (200 lb 9.9 oz)       Body mass index is 24.25 kg/m².      Intake/Output Summary (Last 24 hours) at 7/17/2022 0947  Last data filed at 7/17/2022 0700  Gross per 24 hour   Intake 1277.76 ml   Output 3690 ml   Net -2412.24 ml       Hemodynamic Parameters: CVP 10       Telemetry: ST    Physical Exam  Vitals and nursing note reviewed.   Constitutional:       Appearance: Normal appearance.   HENT:      Head: Normocephalic and atraumatic.   Eyes:      Extraocular Movements: Extraocular movements intact.      Conjunctiva/sclera: Conjunctivae normal.   Cardiovascular:      Rate and Rhythm: Regular rhythm. Tachycardia present.      Comments: Smooth VAD hum  Pulmonary:      Breath sounds: Normal breath sounds.   Abdominal:      Palpations: Abdomen is soft.   Musculoskeletal:         General: No swelling.      Cervical back: Neck supple.      Right lower leg: No edema.      Left lower leg: No edema.   Skin:     General: Skin is dry.      Capillary Refill: Capillary refill takes 2 to 3 seconds.   Neurological:      General: No focal deficit present.      Mental Status: He is alert and oriented to person, place, and time.      Motor: No weakness.   Psychiatric:         Mood and Affect: Mood normal.         Behavior: Behavior normal.         Thought Content: Thought content normal.         Judgment: Judgment normal.       Significant Labs:  CBC:  Recent Labs   Lab 07/15/22  0341 07/16/22  0250 07/17/22  0317   WBC 10.55 10.57 8.70   RBC 2.85* 2.73* 2.84*   HGB 7.3* 6.9* 7.2*   HCT 23.4* 22.8* 24.0*   * 434 433   MCV 82 84 85   MCH 25.6*  25.3* 25.4*   MCHC 31.2* 30.3* 30.0*       BNP:  Recent Labs   Lab 07/11/22  0234 07/13/22  0312 07/14/22  0333   * 624* 458*       CMP:  Recent Labs   Lab 07/15/22  0341 07/15/22  1048 07/16/22  0250 07/16/22  1612 07/16/22  2204 07/17/22 0317   *  --  265*  --   --  189*   CALCIUM 9.4  --  9.5  --   --  9.4   ALBUMIN 2.9*  --  2.7*  --   --  2.8*   PROT 7.8  --  7.6  --   --  7.5   *  --  128*  --   --  130*   K 3.3*   < > 3.9 3.3* 4.1 3.4*   CO2 30*  --  27  --   --  25   CL 89*  --  91*  --   --  94*   BUN 13  --  15  --   --  18   CREATININE 1.4  --  1.6*  --   --  1.8*   ALKPHOS 167*  --  156*  --   --  157*   ALT 37  --  30  --   --  26   AST 32  --  39  --   --  35   BILITOT 1.0  --  0.9  --   --  1.1*    < > = values in this interval not displayed.        Coagulation:   Recent Labs   Lab 07/15/22  0341 07/16/22  0250 07/17/22  0317   INR 2.1* 2.4* 3.1*   APTT 31.7 32.4* 33.9*       LDH:  Recent Labs   Lab 07/15/22  0341 07/16/22  0250 07/17/22  0317   * 364* 332*       Microbiology:  Microbiology Results (last 7 days)       ** No results found for the last 168 hours. **            I have reviewed all pertinent labs within the past 24 hours.    Estimated Creatinine Clearance: 67.2 mL/min (A) (based on SCr of 1.8 mg/dL (H)).    Diagnostic Results:  I have reviewed and interpreted all pertinent imaging results/findings within the past 24 hours.

## 2022-07-17 NOTE — PLAN OF CARE
"      SICU PLAN OF CARE NOTE    Dx: LVAD (left ventricular assist device) present    Shift Events: Pt's V/S stable over shift, no acute events over night. Pt able to rest intermittently over shift, PRN and scheduled pain and sleep meds given, working well to control pt's pain. Pt has adequate UOP over shift. Plan to wean diuretic as tolerated, further independence with PT/OT, stepdown out of ICU possibly today. POC reviewed with pt and pt's significant other, all questions answered and encouraged.     Goals of Care: MAP 65-90    Neuro: AAO x4, Follows Commands and Moves All Extremities    Vital Signs: BP (!) 101/56   Pulse 104   Temp 98.5 °F (36.9 °C) (Oral)   Resp 15   Ht 6' 3" (1.905 m)   Wt 88 kg (194 lb 0.1 oz)   SpO2 99%   BMI 24.25 kg/m²     Cardiac: NSR - ST, HR 80-100s    Respiratory: Room Air    Diet: Cardiac Diet and Diabetic Diet, 1500 FR    Gtts: Lasix    Urine Output: Voids Spontaneously 1850 cc/shift     VAD: HM3, Speed 5200, Flows 3.5-4.7, no alarms over shift     Labs: daily / Q12H K/Mg, 40 mEq of K given IVPB / Accuchecks: ACHS    Skin: No skin breakdown noted over shift. Pt able to reposition independently, pillow support provided. Immerse bed plugged in, inflated, and working properly.       "

## 2022-07-17 NOTE — SUBJECTIVE & OBJECTIVE
"Interval HPI:   Overnight events: remains in ICU. BG reasonably well controlled with scheduled insulin and prn correction scale. LVAD. 17 Days Post-Op  Eating: Diet [facility]; Consistent Carbohydrate; Fluid - 1500mL    50%  Nausea: No  Hypoglycemia and intervention: No  Fever: No  TPN and/or TF: No      BP (!) 84/61 (BP Location: Left arm, Patient Position: Lying)   Pulse 93   Temp 98.5 °F (36.9 °C) (Oral)   Resp 12   Ht 6' 3" (1.905 m)   Wt 88 kg (194 lb 0.1 oz)   SpO2 99%   BMI 24.25 kg/m²     Labs Reviewed and Include    Recent Labs   Lab 07/17/22 0317   *   CALCIUM 9.4   ALBUMIN 2.8*   PROT 7.5   *   K 3.4*   CO2 25   CL 94*   BUN 18   CREATININE 1.8*   ALKPHOS 157*   ALT 26   AST 35   BILITOT 1.1*     Lab Results   Component Value Date    WBC 8.70 07/17/2022    HGB 7.2 (L) 07/17/2022    HCT 24.0 (L) 07/17/2022    MCV 85 07/17/2022     07/17/2022     No results for input(s): TSH, FREET4 in the last 168 hours.  Lab Results   Component Value Date    HGBA1C 9.2 (H) 05/20/2022       Nutritional status:   Body mass index is 24.25 kg/m².  Lab Results   Component Value Date    ALBUMIN 2.8 (L) 07/17/2022    ALBUMIN 2.7 (L) 07/16/2022    ALBUMIN 2.9 (L) 07/15/2022     Lab Results   Component Value Date    PREALBUMIN 12 (L) 07/17/2022    PREALBUMIN 12 (L) 07/14/2022    PREALBUMIN 12 (L) 07/07/2022       Estimated Creatinine Clearance: 67.2 mL/min (A) (based on SCr of 1.8 mg/dL (H)).    Accu-Checks  Recent Labs     07/14/22  2051 07/15/22  0745 07/15/22  1142 07/15/22  1511 07/15/22  2026 07/16/22  0850 07/16/22  1212 07/16/22  1609 07/16/22 2047 07/17/22  0316   POCTGLUCOSE 171* 360* 132* 90 339* 169* 114* 224* 81 212*       Current Medications and/or Treatments Impacting Glycemic Control  Immunotherapy:    Immunosuppressants       None          Steroids:   Hormones (From admission, onward)                None          Pressors:    Autonomic Drugs (From admission, onward)       "          None          Hyperglycemia/Diabetes Medications:   Antihyperglycemics (From admission, onward)                Start     Stop Route Frequency Ordered    07/16/22 2100  insulin detemir U-100 pen 22 Units         -- SubQ Nightly 07/16/22 0636    07/13/22 1130  insulin aspart U-100 pen 10 Units         -- SubQ 3 times daily with meals 07/13/22 1106    07/10/22 1718  insulin aspart U-100 pen 1-10 Units         -- SubQ Before meals & nightly PRN 07/10/22 1618

## 2022-07-18 ENCOUNTER — DOCUMENTATION ONLY (OUTPATIENT)
Dept: ELECTROPHYSIOLOGY | Facility: CLINIC | Age: 37
End: 2022-07-18
Payer: MEDICAID

## 2022-07-18 LAB
ALBUMIN SERPL BCP-MCNC: 2.8 G/DL (ref 3.5–5.2)
ALLENS TEST: ABNORMAL
ALLENS TEST: ABNORMAL
ALP SERPL-CCNC: 156 U/L (ref 55–135)
ALT SERPL W/O P-5'-P-CCNC: 22 U/L (ref 10–44)
ANION GAP SERPL CALC-SCNC: 10 MMOL/L (ref 8–16)
APTT BLDCRRT: 35.4 SEC (ref 21–32)
ASCENDING AORTA: 3.18 CM
AST SERPL-CCNC: 34 U/L (ref 10–40)
BASOPHILS # BLD AUTO: 0.03 K/UL (ref 0–0.2)
BASOPHILS # BLD AUTO: 0.03 K/UL (ref 0–0.2)
BASOPHILS # BLD AUTO: 0.04 K/UL (ref 0–0.2)
BASOPHILS NFR BLD: 0.1 % (ref 0–1.9)
BASOPHILS NFR BLD: 0.3 % (ref 0–1.9)
BASOPHILS NFR BLD: 0.5 % (ref 0–1.9)
BILIRUB SERPL-MCNC: 0.9 MG/DL (ref 0.1–1)
BNP SERPL-MCNC: 408 PG/ML (ref 0–99)
BSA FOR ECHO PROCEDURE: 2.14 M2
BUN SERPL-MCNC: 17 MG/DL (ref 6–20)
BUN SERPL-MCNC: 19 MG/DL (ref 6–20)
BUN SERPL-MCNC: 20 MG/DL (ref 6–20)
CA-I BLDV-SCNC: 1.18 MMOL/L (ref 1.06–1.42)
CALCIUM SERPL-MCNC: 9.3 MG/DL (ref 8.7–10.5)
CHLORIDE SERPL-SCNC: 96 MMOL/L (ref 95–110)
CHLORIDE SERPL-SCNC: 96 MMOL/L (ref 95–110)
CHLORIDE SERPL-SCNC: 98 MMOL/L (ref 95–110)
CO2 SERPL-SCNC: 24 MMOL/L (ref 23–29)
CO2 SERPL-SCNC: 26 MMOL/L (ref 23–29)
CO2 SERPL-SCNC: 26 MMOL/L (ref 23–29)
CREAT SERPL-MCNC: 1.5 MG/DL (ref 0.5–1.4)
CRP SERPL-MCNC: 19.5 MG/L (ref 0–8.2)
CV ECHO LV RWT: 0.49 CM
DELSYS: ABNORMAL
DELSYS: ABNORMAL
DIFFERENTIAL METHOD: ABNORMAL
DOP CALC LVOT AREA: 3.6 CM2
DOP CALC LVOT DIAMETER: 2.15 CM
DOP CALC RVOT PEAK VEL: 0.33 M/S
DOP CALC RVOT VTI: 4.37 CM
E WAVE DECELERATION TIME: 199.55 MSEC
E/A RATIO: 0.88
E/E' RATIO: 30.4 M/S
ECHO LV POSTERIOR WALL: 1.03 CM (ref 0.6–1.1)
EJECTION FRACTION: 10 %
EOSINOPHIL # BLD AUTO: 0 K/UL (ref 0–0.5)
EOSINOPHIL # BLD AUTO: 0.1 K/UL (ref 0–0.5)
EOSINOPHIL # BLD AUTO: 0.1 K/UL (ref 0–0.5)
EOSINOPHIL NFR BLD: 0.1 % (ref 0–8)
EOSINOPHIL NFR BLD: 0.9 % (ref 0–8)
EOSINOPHIL NFR BLD: 1.3 % (ref 0–8)
ERYTHROCYTE [DISTWIDTH] IN BLOOD BY AUTOMATED COUNT: 18.2 % (ref 11.5–14.5)
ERYTHROCYTE [DISTWIDTH] IN BLOOD BY AUTOMATED COUNT: 18.3 % (ref 11.5–14.5)
ERYTHROCYTE [DISTWIDTH] IN BLOOD BY AUTOMATED COUNT: 18.3 % (ref 11.5–14.5)
ERYTHROCYTE [SEDIMENTATION RATE] IN BLOOD BY WESTERGREN METHOD: 29 MM/H
EST. GFR  (AFRICAN AMERICAN): >60 ML/MIN/1.73 M^2
EST. GFR  (NON AFRICAN AMERICAN): 58.6 ML/MIN/1.73 M^2
FIBRINOGEN PPP-MCNC: 523 MG/DL (ref 182–400)
FIO2: 21
FRACTIONAL SHORTENING: 9 % (ref 28–44)
GLUCOSE SERPL-MCNC: 159 MG/DL (ref 70–110)
GLUCOSE SERPL-MCNC: 190 MG/DL (ref 70–110)
GLUCOSE SERPL-MCNC: 246 MG/DL (ref 70–110)
HCO3 UR-SCNC: 23.5 MMOL/L (ref 24–28)
HCO3 UR-SCNC: 27.2 MMOL/L (ref 24–28)
HCT VFR BLD AUTO: 23.1 % (ref 40–54)
HCT VFR BLD AUTO: 24.5 % (ref 40–54)
HCT VFR BLD AUTO: 24.5 % (ref 40–54)
HGB BLD-MCNC: 7.3 G/DL (ref 14–18)
HGB BLD-MCNC: 7.4 G/DL (ref 14–18)
HGB BLD-MCNC: 7.5 G/DL (ref 14–18)
IMM GRANULOCYTES # BLD AUTO: 0.02 K/UL (ref 0–0.04)
IMM GRANULOCYTES # BLD AUTO: 0.03 K/UL (ref 0–0.04)
IMM GRANULOCYTES # BLD AUTO: 0.09 K/UL (ref 0–0.04)
IMM GRANULOCYTES NFR BLD AUTO: 0.2 % (ref 0–0.5)
IMM GRANULOCYTES NFR BLD AUTO: 0.3 % (ref 0–0.5)
IMM GRANULOCYTES NFR BLD AUTO: 0.4 % (ref 0–0.5)
INR PPP: 4.1 (ref 0.8–1.2)
INTERVENTRICULAR SEPTUM: 0.86 CM (ref 0.6–1.1)
LA MAJOR: 4.76 CM
LA MINOR: 5.26 CM
LA WIDTH: 3.15 CM
LACTATE SERPL-SCNC: 0.9 MMOL/L (ref 0.5–2.2)
LDH SERPL L TO P-CCNC: 303 U/L (ref 110–260)
LEFT ATRIUM SIZE: 1.79 CM
LEFT ATRIUM VOLUME INDEX MOD: 14.4 ML/M2
LEFT ATRIUM VOLUME INDEX: 11.1 ML/M2
LEFT ATRIUM VOLUME MOD: 31.01 CM3
LEFT ATRIUM VOLUME: 23.95 CM3
LEFT INTERNAL DIMENSION IN SYSTOLE: 3.85 CM (ref 2.1–4)
LEFT VENTRICLE DIASTOLIC VOLUME INDEX: 37.28 ML/M2
LEFT VENTRICLE DIASTOLIC VOLUME: 80.15 ML
LEFT VENTRICLE MASS INDEX: 60 G/M2
LEFT VENTRICLE SYSTOLIC VOLUME INDEX: 29.7 ML/M2
LEFT VENTRICLE SYSTOLIC VOLUME: 63.93 ML
LEFT VENTRICULAR INTERNAL DIMENSION IN DIASTOLE: 4.24 CM (ref 3.5–6)
LEFT VENTRICULAR MASS: 128.83 G
LV LATERAL E/E' RATIO: 38 M/S
LV SEPTAL E/E' RATIO: 25.33 M/S
LYMPHOCYTES # BLD AUTO: 0.6 K/UL (ref 1–4.8)
LYMPHOCYTES # BLD AUTO: 1.1 K/UL (ref 1–4.8)
LYMPHOCYTES # BLD AUTO: 1.5 K/UL (ref 1–4.8)
LYMPHOCYTES NFR BLD: 11.6 % (ref 18–48)
LYMPHOCYTES NFR BLD: 18.4 % (ref 18–48)
LYMPHOCYTES NFR BLD: 2.8 % (ref 18–48)
MAGNESIUM SERPL-MCNC: 1.9 MG/DL (ref 1.6–2.6)
MAGNESIUM SERPL-MCNC: 2.2 MG/DL (ref 1.6–2.6)
MCH RBC QN AUTO: 25.2 PG (ref 27–31)
MCH RBC QN AUTO: 25.6 PG (ref 27–31)
MCH RBC QN AUTO: 25.6 PG (ref 27–31)
MCHC RBC AUTO-ENTMCNC: 30.2 G/DL (ref 32–36)
MCHC RBC AUTO-ENTMCNC: 30.6 G/DL (ref 32–36)
MCHC RBC AUTO-ENTMCNC: 31.6 G/DL (ref 32–36)
MCV RBC AUTO: 80 FL (ref 82–98)
MCV RBC AUTO: 84 FL (ref 82–98)
MCV RBC AUTO: 85 FL (ref 82–98)
MODE: ABNORMAL
MODE: ABNORMAL
MONOCYTES # BLD AUTO: 0.4 K/UL (ref 0.3–1)
MONOCYTES # BLD AUTO: 0.6 K/UL (ref 0.3–1)
MONOCYTES # BLD AUTO: 0.6 K/UL (ref 0.3–1)
MONOCYTES NFR BLD: 1.7 % (ref 4–15)
MONOCYTES NFR BLD: 6.3 % (ref 4–15)
MONOCYTES NFR BLD: 6.9 % (ref 4–15)
MV PEAK A VEL: 0.86 M/S
MV PEAK E VEL: 0.76 M/S
MV STENOSIS PRESSURE HALF TIME: 57.87 MS
MV VALVE AREA P 1/2 METHOD: 3.8 CM2
NEUTROPHILS # BLD AUTO: 19.7 K/UL (ref 1.8–7.7)
NEUTROPHILS # BLD AUTO: 6 K/UL (ref 1.8–7.7)
NEUTROPHILS # BLD AUTO: 7.5 K/UL (ref 1.8–7.7)
NEUTROPHILS NFR BLD: 72.7 % (ref 38–73)
NEUTROPHILS NFR BLD: 80.6 % (ref 38–73)
NEUTROPHILS NFR BLD: 94.9 % (ref 38–73)
NRBC BLD-RTO: 0 /100 WBC
PCO2 BLDA: 33.8 MMHG (ref 35–45)
PCO2 BLDA: 45.5 MMHG (ref 35–45)
PH SMN: 7.38 [PH] (ref 7.35–7.45)
PH SMN: 7.45 [PH] (ref 7.35–7.45)
PHOSPHATE SERPL-MCNC: 3.8 MG/DL (ref 2.7–4.5)
PISA TR MAX VEL: 2.14 M/S
PLATELET # BLD AUTO: 390 K/UL (ref 150–450)
PLATELET # BLD AUTO: 455 K/UL (ref 150–450)
PLATELET # BLD AUTO: 461 K/UL (ref 150–450)
PMV BLD AUTO: 8.7 FL (ref 9.2–12.9)
PMV BLD AUTO: 9.4 FL (ref 9.2–12.9)
PMV BLD AUTO: 9.7 FL (ref 9.2–12.9)
PO2 BLDA: 29 MMHG (ref 40–60)
PO2 BLDA: 34 MMHG (ref 40–60)
POC BE: -1 MMOL/L
POC BE: 2 MMOL/L
POC SATURATED O2: 53 % (ref 95–100)
POC SATURATED O2: 68 % (ref 95–100)
POC TCO2: 24 MMOL/L (ref 24–29)
POC TCO2: 29 MMOL/L (ref 24–29)
POCT GLUCOSE: 170 MG/DL (ref 70–110)
POCT GLUCOSE: 173 MG/DL (ref 70–110)
POCT GLUCOSE: 175 MG/DL (ref 70–110)
POCT GLUCOSE: 184 MG/DL (ref 70–110)
POCT GLUCOSE: 186 MG/DL (ref 70–110)
POCT GLUCOSE: 212 MG/DL (ref 70–110)
POCT GLUCOSE: 264 MG/DL (ref 70–110)
POTASSIUM SERPL-SCNC: 3.1 MMOL/L (ref 3.5–5.1)
POTASSIUM SERPL-SCNC: 3.1 MMOL/L (ref 3.5–5.1)
POTASSIUM SERPL-SCNC: 3.5 MMOL/L (ref 3.5–5.1)
POTASSIUM SERPL-SCNC: 3.5 MMOL/L (ref 3.5–5.1)
PROCALCITONIN SERPL IA-MCNC: 0.18 NG/ML
PROT SERPL-MCNC: 7.7 G/DL (ref 6–8.4)
PROTHROMBIN TIME: 39.6 SEC (ref 9–12.5)
PV MEAN GRADIENT: 0.15 MMHG
RA MAJOR: 5.93 CM
RA PRESSURE: 15 MMHG
RA WIDTH: 6.1 CM
RBC # BLD AUTO: 2.89 M/UL (ref 4.6–6.2)
RBC # BLD AUTO: 2.9 M/UL (ref 4.6–6.2)
RBC # BLD AUTO: 2.93 M/UL (ref 4.6–6.2)
RIGHT VENTRICULAR END-DIASTOLIC DIMENSION: 6.26 CM
SAMPLE: ABNORMAL
SAMPLE: ABNORMAL
SINUS: 3.1 CM
SITE: ABNORMAL
SITE: ABNORMAL
SODIUM SERPL-SCNC: 130 MMOL/L (ref 136–145)
SODIUM SERPL-SCNC: 132 MMOL/L (ref 136–145)
SODIUM SERPL-SCNC: 134 MMOL/L (ref 136–145)
SP02: 21
STJ: 2.73 CM
TDI LATERAL: 0.02 M/S
TDI SEPTAL: 0.03 M/S
TDI: 0.03 M/S
TR MAX PG: 18 MMHG
TRICUSPID ANNULAR PLANE SYSTOLIC EXCURSION: 1.24 CM
TV REST PULMONARY ARTERY PRESSURE: 33 MMHG
WBC # BLD AUTO: 20.78 K/UL (ref 3.9–12.7)
WBC # BLD AUTO: 8.22 K/UL (ref 3.9–12.7)
WBC # BLD AUTO: 9.34 K/UL (ref 3.9–12.7)

## 2022-07-18 PROCEDURE — 85025 COMPLETE CBC W/AUTO DIFF WBC: CPT | Performed by: STUDENT IN AN ORGANIZED HEALTH CARE EDUCATION/TRAINING PROGRAM

## 2022-07-18 PROCEDURE — 83735 ASSAY OF MAGNESIUM: CPT | Performed by: STUDENT IN AN ORGANIZED HEALTH CARE EDUCATION/TRAINING PROGRAM

## 2022-07-18 PROCEDURE — 85025 COMPLETE CBC W/AUTO DIFF WBC: CPT | Mod: 91 | Performed by: INTERNAL MEDICINE

## 2022-07-18 PROCEDURE — 85730 THROMBOPLASTIN TIME PARTIAL: CPT | Performed by: STUDENT IN AN ORGANIZED HEALTH CARE EDUCATION/TRAINING PROGRAM

## 2022-07-18 PROCEDURE — 83605 ASSAY OF LACTIC ACID: CPT | Performed by: STUDENT IN AN ORGANIZED HEALTH CARE EDUCATION/TRAINING PROGRAM

## 2022-07-18 PROCEDURE — 36600 WITHDRAWAL OF ARTERIAL BLOOD: CPT

## 2022-07-18 PROCEDURE — 63600175 PHARM REV CODE 636 W HCPCS: Performed by: STUDENT IN AN ORGANIZED HEALTH CARE EDUCATION/TRAINING PROGRAM

## 2022-07-18 PROCEDURE — 93750 PR INTERROGATE VENT ASSIST DEV, IN PERSON, W PHYSICIAN ANALYSIS: ICD-10-PCS | Mod: ,,, | Performed by: INTERNAL MEDICINE

## 2022-07-18 PROCEDURE — 25000003 PHARM REV CODE 250: Performed by: STUDENT IN AN ORGANIZED HEALTH CARE EDUCATION/TRAINING PROGRAM

## 2022-07-18 PROCEDURE — 80048 BASIC METABOLIC PNL TOTAL CA: CPT | Mod: 91,XB | Performed by: INTERNAL MEDICINE

## 2022-07-18 PROCEDURE — 93750 INTERROGATION VAD IN PERSON: CPT | Mod: ,,, | Performed by: INTERNAL MEDICINE

## 2022-07-18 PROCEDURE — 63600175 PHARM REV CODE 636 W HCPCS: Performed by: PHYSICIAN ASSISTANT

## 2022-07-18 PROCEDURE — 25000003 PHARM REV CODE 250: Performed by: THORACIC SURGERY (CARDIOTHORACIC VASCULAR SURGERY)

## 2022-07-18 PROCEDURE — 99291 CRITICAL CARE FIRST HOUR: CPT | Mod: ,,, | Performed by: PHYSICIAN ASSISTANT

## 2022-07-18 PROCEDURE — 84100 ASSAY OF PHOSPHORUS: CPT | Performed by: STUDENT IN AN ORGANIZED HEALTH CARE EDUCATION/TRAINING PROGRAM

## 2022-07-18 PROCEDURE — 20000000 HC ICU ROOM

## 2022-07-18 PROCEDURE — 25000003 PHARM REV CODE 250: Performed by: NURSE PRACTITIONER

## 2022-07-18 PROCEDURE — 83615 LACTATE (LD) (LDH) ENZYME: CPT | Performed by: THORACIC SURGERY (CARDIOTHORACIC VASCULAR SURGERY)

## 2022-07-18 PROCEDURE — 85384 FIBRINOGEN ACTIVITY: CPT | Performed by: STUDENT IN AN ORGANIZED HEALTH CARE EDUCATION/TRAINING PROGRAM

## 2022-07-18 PROCEDURE — 97530 THERAPEUTIC ACTIVITIES: CPT

## 2022-07-18 PROCEDURE — 25000003 PHARM REV CODE 250: Performed by: ANESTHESIOLOGY

## 2022-07-18 PROCEDURE — 25000003 PHARM REV CODE 250

## 2022-07-18 PROCEDURE — 80048 BASIC METABOLIC PNL TOTAL CA: CPT | Performed by: STUDENT IN AN ORGANIZED HEALTH CARE EDUCATION/TRAINING PROGRAM

## 2022-07-18 PROCEDURE — 82330 ASSAY OF CALCIUM: CPT | Performed by: STUDENT IN AN ORGANIZED HEALTH CARE EDUCATION/TRAINING PROGRAM

## 2022-07-18 PROCEDURE — 99900035 HC TECH TIME PER 15 MIN (STAT)

## 2022-07-18 PROCEDURE — 94761 N-INVAS EAR/PLS OXIMETRY MLT: CPT

## 2022-07-18 PROCEDURE — 25000003 PHARM REV CODE 250: Performed by: INTERNAL MEDICINE

## 2022-07-18 PROCEDURE — 99291 PR CRITICAL CARE, E/M 30-74 MINUTES: ICD-10-PCS | Mod: ,,, | Performed by: PHYSICIAN ASSISTANT

## 2022-07-18 PROCEDURE — 85025 COMPLETE CBC W/AUTO DIFF WBC: CPT | Mod: 91 | Performed by: STUDENT IN AN ORGANIZED HEALTH CARE EDUCATION/TRAINING PROGRAM

## 2022-07-18 PROCEDURE — A4216 STERILE WATER/SALINE, 10 ML: HCPCS | Performed by: THORACIC SURGERY (CARDIOTHORACIC VASCULAR SURGERY)

## 2022-07-18 PROCEDURE — 82800 BLOOD PH: CPT

## 2022-07-18 PROCEDURE — 63600150 PHARM REV CODE 636: Performed by: NURSE PRACTITIONER

## 2022-07-18 PROCEDURE — 25000003 PHARM REV CODE 250: Performed by: PHYSICIAN ASSISTANT

## 2022-07-18 PROCEDURE — 83880 ASSAY OF NATRIURETIC PEPTIDE: CPT | Performed by: STUDENT IN AN ORGANIZED HEALTH CARE EDUCATION/TRAINING PROGRAM

## 2022-07-18 PROCEDURE — 86140 C-REACTIVE PROTEIN: CPT | Performed by: STUDENT IN AN ORGANIZED HEALTH CARE EDUCATION/TRAINING PROGRAM

## 2022-07-18 PROCEDURE — 82803 BLOOD GASES ANY COMBINATION: CPT

## 2022-07-18 PROCEDURE — 83735 ASSAY OF MAGNESIUM: CPT | Mod: 91 | Performed by: INTERNAL MEDICINE

## 2022-07-18 PROCEDURE — 63600175 PHARM REV CODE 636 W HCPCS: Performed by: NURSE PRACTITIONER

## 2022-07-18 PROCEDURE — 80053 COMPREHEN METABOLIC PANEL: CPT

## 2022-07-18 PROCEDURE — 84145 PROCALCITONIN (PCT): CPT | Performed by: STUDENT IN AN ORGANIZED HEALTH CARE EDUCATION/TRAINING PROGRAM

## 2022-07-18 PROCEDURE — 85610 PROTHROMBIN TIME: CPT | Performed by: PHYSICIAN ASSISTANT

## 2022-07-18 PROCEDURE — 27000248 HC VAD-ADDITIONAL DAY

## 2022-07-18 RX ORDER — POTASSIUM CHLORIDE 29.8 MG/ML
40 INJECTION INTRAVENOUS ONCE
Status: COMPLETED | OUTPATIENT
Start: 2022-07-18 | End: 2022-07-18

## 2022-07-18 RX ORDER — POTASSIUM CHLORIDE 14.9 MG/ML
20 INJECTION INTRAVENOUS ONCE
Status: COMPLETED | OUTPATIENT
Start: 2022-07-18 | End: 2022-07-18

## 2022-07-18 RX ORDER — CEFEPIME HYDROCHLORIDE 1 G/50ML
1 INJECTION, SOLUTION INTRAVENOUS
Status: DISCONTINUED | OUTPATIENT
Start: 2022-07-18 | End: 2022-07-22

## 2022-07-18 RX ORDER — ONDANSETRON 2 MG/ML
4 INJECTION INTRAMUSCULAR; INTRAVENOUS EVERY 6 HOURS PRN
Status: DISCONTINUED | OUTPATIENT
Start: 2022-07-18 | End: 2022-07-28 | Stop reason: HOSPADM

## 2022-07-18 RX ORDER — DOBUTAMINE HYDROCHLORIDE 400 MG/100ML
2.5 INJECTION INTRAVENOUS CONTINUOUS
Status: DISCONTINUED | OUTPATIENT
Start: 2022-07-18 | End: 2022-07-25

## 2022-07-18 RX ORDER — INSULIN ASPART 100 [IU]/ML
4 INJECTION, SOLUTION INTRAVENOUS; SUBCUTANEOUS
Status: DISCONTINUED | OUTPATIENT
Start: 2022-07-18 | End: 2022-07-28 | Stop reason: HOSPADM

## 2022-07-18 RX ADMIN — TIZANIDINE 2 MG: 2 TABLET ORAL at 01:07

## 2022-07-18 RX ADMIN — INSULIN ASPART 2 UNITS: 100 INJECTION, SOLUTION INTRAVENOUS; SUBCUTANEOUS at 09:07

## 2022-07-18 RX ADMIN — GABAPENTIN 100 MG: 100 CAPSULE ORAL at 02:07

## 2022-07-18 RX ADMIN — INSULIN ASPART 2 UNITS: 100 INJECTION, SOLUTION INTRAVENOUS; SUBCUTANEOUS at 01:07

## 2022-07-18 RX ADMIN — INSULIN DETEMIR 22 UNITS: 100 INJECTION, SOLUTION SUBCUTANEOUS at 11:07

## 2022-07-18 RX ADMIN — POLYETHYLENE GLYCOL 3350 17 G: 17 POWDER, FOR SOLUTION ORAL at 09:07

## 2022-07-18 RX ADMIN — BUSPIRONE HYDROCHLORIDE 7.5 MG: 5 TABLET ORAL at 05:07

## 2022-07-18 RX ADMIN — TRAZODONE HYDROCHLORIDE 50 MG: 50 TABLET ORAL at 11:07

## 2022-07-18 RX ADMIN — DOBUTAMINE 2.5 MCG/KG/MIN: 12.5 INJECTION, SOLUTION, CONCENTRATE INTRAVENOUS at 04:07

## 2022-07-18 RX ADMIN — POTASSIUM CHLORIDE 40 MEQ: 20 TABLET, EXTENDED RELEASE ORAL at 09:07

## 2022-07-18 RX ADMIN — FUROSEMIDE 30 MG/HR: 10 INJECTION, SOLUTION INTRAMUSCULAR; INTRAVENOUS at 05:07

## 2022-07-18 RX ADMIN — Medication 10 ML: at 12:07

## 2022-07-18 RX ADMIN — OXYCODONE HYDROCHLORIDE 10 MG: 10 TABLET ORAL at 01:07

## 2022-07-18 RX ADMIN — GABAPENTIN 100 MG: 100 CAPSULE ORAL at 09:07

## 2022-07-18 RX ADMIN — ACETAMINOPHEN 650 MG: 325 TABLET ORAL at 01:07

## 2022-07-18 RX ADMIN — FUROSEMIDE 30 MG/HR: 10 INJECTION, SOLUTION INTRAMUSCULAR; INTRAVENOUS at 12:07

## 2022-07-18 RX ADMIN — POTASSIUM CHLORIDE 40 MEQ: 29.8 INJECTION, SOLUTION INTRAVENOUS at 03:07

## 2022-07-18 RX ADMIN — PROMETHAZINE HYDROCHLORIDE 12.5 MG: 25 INJECTION INTRAMUSCULAR; INTRAVENOUS at 10:07

## 2022-07-18 RX ADMIN — POTASSIUM CHLORIDE 40 MEQ: 20 TABLET, EXTENDED RELEASE ORAL at 02:07

## 2022-07-18 RX ADMIN — Medication 10 ML: at 06:07

## 2022-07-18 RX ADMIN — POTASSIUM CHLORIDE 40 MEQ: 20 TABLET, EXTENDED RELEASE ORAL at 11:07

## 2022-07-18 RX ADMIN — ACETAMINOPHEN 650 MG: 325 TABLET ORAL at 05:07

## 2022-07-18 RX ADMIN — MAGNESIUM SULFATE HEPTAHYDRATE 2 G: 40 INJECTION, SOLUTION INTRAVENOUS at 07:07

## 2022-07-18 RX ADMIN — INSULIN ASPART 3 UNITS: 100 INJECTION, SOLUTION INTRAVENOUS; SUBCUTANEOUS at 03:07

## 2022-07-18 RX ADMIN — TIZANIDINE 2 MG: 2 TABLET ORAL at 05:07

## 2022-07-18 RX ADMIN — DOBUTAMINE 2.5 MCG/KG/MIN: 12.5 INJECTION, SOLUTION, CONCENTRATE INTRAVENOUS at 06:07

## 2022-07-18 RX ADMIN — Medication 324 MG: at 07:07

## 2022-07-18 RX ADMIN — GABAPENTIN 100 MG: 100 CAPSULE ORAL at 10:07

## 2022-07-18 RX ADMIN — Medication 10 ML: at 05:07

## 2022-07-18 RX ADMIN — POTASSIUM CHLORIDE 20 MEQ: 14.9 INJECTION, SOLUTION INTRAVENOUS at 05:07

## 2022-07-18 RX ADMIN — INSULIN ASPART 10 UNITS: 100 INJECTION, SOLUTION INTRAVENOUS; SUBCUTANEOUS at 01:07

## 2022-07-18 RX ADMIN — ONDANSETRON 4 MG: 2 INJECTION INTRAMUSCULAR; INTRAVENOUS at 05:07

## 2022-07-18 RX ADMIN — ONDANSETRON 4 MG: 2 INJECTION INTRAMUSCULAR; INTRAVENOUS at 09:07

## 2022-07-18 RX ADMIN — FUROSEMIDE 30 MG/HR: 10 INJECTION, SOLUTION INTRAMUSCULAR; INTRAVENOUS at 11:07

## 2022-07-18 RX ADMIN — FAMOTIDINE 40 MG: 20 TABLET ORAL at 09:07

## 2022-07-18 RX ADMIN — TIZANIDINE 2 MG: 2 TABLET ORAL at 10:07

## 2022-07-18 RX ADMIN — ACETAMINOPHEN 650 MG: 325 TABLET ORAL at 10:07

## 2022-07-18 NOTE — NURSING
Orthostatic VS Taken.   Lyin/0.   Sittin/0.   Standing: COLLIN; pt became dizzy and lightheaded after standing for 30 seconds. MD aware.

## 2022-07-18 NOTE — CARE UPDATE
BG goal 140-180    -A1C   Lab Results   Component Value Date    HGBA1C 9.2 (H) 05/20/2022         -HOME REGIMEN: Detemir  23  units daily and Aspart   12  units TIDWM and  Mod dose correction insulin    -INPATIENT REGIMEN: levemir 22  units daily and novolog 10 units with meals.     -GLUCOSE TREND FOR THE PAST 24HRS: 113-264    -NO HYPOGYCEMIAS NOTED     -TOLERATING 75% OF PO DIET     -Diet: Diet Cardiac [facility]; Consistent Carbohydrate; Fluid - 1500mL        Remains in SICU, NAEON. Creatinine 1.5. BG reasonably well controlled with scheduled insulin and prn correction scale.  LVAD. 18 Days Post-Op      Plan:  continue levemir 18 units daily.   continue novolog 10 units with meals.   Start  novolog 4 units prn snack dose.   BG monitoring ac/hs and moderate dose correction scale.     Discharge planning:tbd     Endocrine to continue to follow    ** Please call Endocrine for any BG related issues **

## 2022-07-18 NOTE — PT/OT/SLP PROGRESS
Occupational Therapy   Treatment    Name: Kevan Queen  MRN: [id number]  Admitting Diagnosis:  LVAD (left ventricular assist device) present  18 Days Post-Op    Recommendations:     Discharge Recommendations: home health OT  Discharge Equipment Recommendations:   (TBD)  Barriers to discharge:       Assessment:     Kevan Queen is a 37 y.o. male with a medical diagnosis of LVAD (left ventricular assist device) present.  He presents with c/o dizziness upon standing with MAP of 76 in supine and 68 EOB. Pt only agreeable to t/f to chair as he didn't have a bag for his batteries because it was being switched out. Performance deficits affecting function are weakness, impaired endurance, impaired self care skills, impaired functional mobility, gait instability, impaired balance, decreased coordination.     Rehab Prognosis:  Good; patient would benefit from acute skilled OT services to address these deficits and reach maximum level of function.       Plan:     Patient to be seen 3 x/week to address the above listed problems via self-care/home management, therapeutic activities, therapeutic exercises  · Plan of Care Expires: 08/02/22  · Plan of Care Reviewed with: patient, caregiver    Subjective     Pain/Comfort:  · Pain Rating 1: 0/10    Objective:     Communicated with: rn prior to session.  Patient found supine with blood pressure cuff, LVAD, peripheral IV, pulse ox (continuous), telemetry upon OT entry to room.    General Precautions: Standard, fall, sternal   Orthopedic Precautions:N/A   Braces:    Respiratory Status: Room air     Occupational Performance:     Bed Mobility:    · Supervision    Functional Mobility/Transfers:  · Patient completed Sit <> Stand Transfer with stand by assistance  with  no assistive device   · Patient completed Bed <> Chair Transfer using Step Transfer technique with stand by assistance with no assistive device    Activities of Daily Living:  · Pt declined.    [facility] 6 Click ADL:  21    Treatment & Education:  Pt reported managing his LVAD well and he knows all of the components and does daily self-tests but reports interest in further education with LVAD coordinator.  Discussed OT POC and progress.    Patient left up in chair with all lines intact and call button in reachEducation:      GOALS:   Multidisciplinary Problems     Occupational Therapy Goals        Problem: Occupational Therapy    Goal Priority Disciplines Outcome Interventions   Occupational Therapy Goal     OT, PT/OT Ongoing, Progressing    Description: Goals to be met by: 8/2/22     Patient will increase functional independence with ADLs by performing:    UE Dressing with Modified [place].  LE Dressing with Modified [place] and Assistive Devices as needed.  Grooming while standing at sink with Modified [place].  Toileting from toilet with Modified [place] for hygiene and clothing management.   Bathing from  shower chair/bench with Modified [place].  Toilet transfer to toilet with Modified [place].  Increased functional strength to WFL for B UE.  Upper extremity exercise program x15 reps per handout, with assistance as needed.  Pt will be I in all LVAD management.                     Time Tracking:     OT Date of Treatment: 07/18/22  OT Start Time: 1120  OT Stop Time: 1135  OT Total Time (min): 15 min    Billable Minutes:Therapeutic Activity 15    OT/MARTÍNEZ: OT          7/18/2022

## 2022-07-18 NOTE — PLAN OF CARE
Goals reviewed and revised as appropriate. Continue POC.    Problem: Physical Therapy  Goal: Physical Therapy Goal  Description: Goals to be met by: 2022    Patient will increase functional independence with mobility by performin. Supine to sit with MInimal Assistance -met   Updated: supervision  2. Sit to stand transfer with Minimal Assistance - met   2a. Sit to stand transfer with supervision- met   2b. Sit to stand with independence   3. Gait  x 50 feet with Contact Guard Assistance - met    3a. Gait 400 ft with Supervision       Outcome: Ongoing, Progressing

## 2022-07-18 NOTE — CARE UPDATE
At about 12;46am, the patient developed sustained WCT rate 270s-300s for 30-40 seconds. Reviewed telemetry- lead I was in sinus rhythm while the other leads appeared similar to MMVT. Patient was resting in bed and asymptomatic. Had single chamber ICD which didn't discharge per patient report. Vitals were stable. No LVAD alarms. Do not suspect VT, however patient will benefit from interrogation of his device in the MA ([place]) to confirm. If an event truly occurred, can consider EP consult to evaluate. K 3.1. Electrolytes were replaced. Repeat hemodynamics were stable. CO 6.6, CI 3 , .     Discussed with staff    Eleuterio Genao MD  Cardiovascular Disease PGY4  [facility]

## 2022-07-18 NOTE — PROGRESS NOTES
[facility] - [facility] Intensive Care  Heart Transplant  Progress Note    Patient Name: Kevan Queen  MRN: [id number]  Admission Date: 6/13/2022  Hospital Length of Stay: 35 days  Attending Physician: Josh Pulido MD  Primary Care Provider: ORALIA Cline  Principal Problem:LVAD (left ventricular assist device) present    Subjective:     Interval History: NAEON. Patient says he gets lightheaded/ pre-syncopal each time he goes from a laying to sitting position. Doppler BP drops by 8 mmHg when going from laying to sitting.  Diuresing well on lasix 30 mg/hr. SvO2 53% CO 6.6 CI 3.0 . CVP remains elevated at 14 this AM. New echo pending and stepdown transfer orders in    Continuous Infusions:   sodium chloride 0.9% 5 mL/hr at 06/27/22 0055    sodium chloride 0.9% 5 mL/hr at 07/18/22 1100    furosemide (LASIX) 2 mg/mL continuous infusion (non-titrating) 30 mg/hr (07/18/22 1100)     Scheduled Meds:   acetaminophen  650 mg Oral Q8H    busPIRone  7.5 mg Oral Daily    famotidine  40 mg Oral Daily    ferrous gluconate  324 mg Oral Daily with breakfast    gabapentin  100 mg Oral TID    insulin aspart U-100  10 Units Subcutaneous TIDWM    insulin detemir U-100  22 Units Subcutaneous QHS    INV aspirin/placebo  100 mg Oral Daily    polyethylene glycol  17 g Oral Daily    potassium chloride  40 mEq Oral TID    sodium chloride 0.9%  10 mL Intravenous Q6H    tiZANidine  2 mg Oral Q8H    traZODone  50 mg Oral QHS     PRN Meds:albuterol sulfate, bisacodyL, dextrose 10%, dextrose 10%, docusate sodium, glucagon (human recombinant), glucose, glucose, insulin aspart U-100, magnesium sulfate IVPB, oxyCODONE, polyethylene glycol, sodium chloride 0.9%, sodium chloride 0.9%, Flushing PICC Protocol **AND** sodium chloride 0.9% **AND** sodium chloride 0.9%, traMADoL    Review of patient's allergies indicates:   Allergen Reactions    Aspirin Other (See Comments)     Mr. Thacker is enrolled in Dr. Paige's [facility]  Trial and cannot have any aspirin and/or aspirin-containing products. DO NOT cancel any orders for INV Aspirin 100 mg/Placebo. If you have questions, please contact Isabel @ [phone], [phone],[email or web address], secure chat or MS Teams message.    Bumex [bumetanide] Hives    Lactose Diarrhea     Other reaction(s): Abdominal distension, gaseous    Torsemide Hives     Objective:     Vital Signs (Most Recent):  Temp: 98.4 °F (36.9 °C) (07/18/22 0701)  Pulse: 99 (07/18/22 1130)  Resp: (!) 21 (07/18/22 1130)  BP: (!) 76/0 (07/18/22 1100)  SpO2: 100 % (07/18/22 1100)   Vital Signs (24h Range):  Temp:  [98 °F (36.7 °C)-98.5 °F (36.9 °C)] 98.4 °F (36.9 °C)  Pulse:  [] 99  Resp:  [10-37] 21  SpO2:  [99 %-100 %] 100 %  BP: (64-87)/(0-54) 76/0     Patient Vitals for the past 72 hrs (Last 3 readings):   Weight   07/18/22 0556 87 kg (191 lb 12.8 oz)   07/17/22 0426 88 kg (194 lb 0.1 oz)   07/16/22 0402 92 kg (202 lb 13.2 oz)       Body mass index is 23.97 kg/m².      Intake/Output Summary (Last 24 hours) at 7/18/2022 1228  Last data filed at 7/18/2022 1100  Gross per 24 hour   Intake 2181.05 ml   Output 3575 ml   Net -1393.95 ml       Hemodynamic Parameters: CVP 10       Telemetry: ST    Physical Exam  Vitals and nursing note reviewed.   Constitutional:       Appearance: Normal appearance.   HENT:      Head: Normocephalic and atraumatic.   Eyes:      Extraocular Movements: Extraocular movements intact.      Conjunctiva/sclera: Conjunctivae normal.   Cardiovascular:      Rate and Rhythm: Regular rhythm. Tachycardia present.      Comments: Smooth VAD hum  Pulmonary:      Breath sounds: Normal breath sounds.   Abdominal:      Palpations: Abdomen is soft.   Musculoskeletal:         General: No swelling.      Cervical back: Neck supple.      Right lower leg: No edema.      Left lower leg: No edema.   Skin:     General: Skin is dry.      Capillary Refill: Capillary refill takes 2 to 3 seconds.   Neurological:       General: No focal deficit present.      Mental Status: He is alert and oriented to person, place, and time.      Motor: No weakness.   Psychiatric:         Mood and Affect: Mood normal.         Behavior: Behavior normal.         Thought Content: Thought content normal.         Judgment: Judgment normal.       Significant Labs:  CBC:  Recent Labs   Lab 07/16/22 0250 07/17/22 0317 07/18/22 0311   WBC 10.57 8.70 8.22   RBC 2.73* 2.84* 2.93*   HGB 6.9* 7.2* 7.5*   HCT 22.8* 24.0* 24.5*    433 461*   MCV 84 85 84   MCH 25.3* 25.4* 25.6*   MCHC 30.3* 30.0* 30.6*       BNP:  Recent Labs   Lab 07/13/22 0312 07/14/22 0333 07/18/22 0311   * 458* 408*       CMP:  Recent Labs   Lab 07/16/22  0250 07/16/22  1612 07/17/22 0317 07/17/22  1551 07/18/22  0115 07/18/22 0311   *  --  189*  --  190* 246*   CALCIUM 9.5  --  9.4  --  9.3 9.3   ALBUMIN 2.7*  --  2.8*  --   --  2.8*   PROT 7.6  --  7.5  --   --  7.7   *  --  130*  --  134* 132*   K 3.9   < > 3.4* 4.1 3.1* 3.1*   CO2 27  --  25  --  26 26   CL 91*  --  94*  --  98 96   BUN 15  --  18  --  20 19   CREATININE 1.6*  --  1.8*  --  1.5* 1.5*   ALKPHOS 156*  --  157*  --   --  156*   ALT 30  --  26  --   --  22   AST 39  --  35  --   --  34   BILITOT 0.9  --  1.1*  --   --  0.9    < > = values in this interval not displayed.        Coagulation:   Recent Labs   Lab 07/16/22  0250 07/17/22 0317 07/18/22 0311   INR 2.4* 3.1* 4.1*   APTT 32.4* 33.9* 35.4*       LDH:  Recent Labs   Lab 07/16/22  0250 07/17/22  0317 07/18/22  0311   * 332* 303*       Microbiology:  Microbiology Results (last 7 days)       ** No results found for the last 168 hours. **            I have reviewed all pertinent labs within the past 24 hours.    Estimated Creatinine Clearance: 80.6 mL/min (A) (based on SCr of 1.5 mg/dL (H)).    Diagnostic Results:  I have reviewed and interpreted all pertinent imaging results/findings within the past 24 hours.    Assessment and  "Plan:     38 yo male with NIDCM with BiV systolic heart failure, on home Milrinone at 0.375 mcg/kg/min, presented at [facility] 4/2/22 ,was not evaluated for OHT as he has recently quit smoking in April 2022 but was approved for VAD with plan to begin OHT evaluation in upcoming months if Mr Queen is stable and suitable for OHT eval (blood group A), 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 Went to ED at [facility] 6/10 but left after waiting 4 hours. Had clinic appointment with us today, but arrived to [facility] early this morning and decided to go to the ED instead. Baseline Lasix dose is 80 mg bid. Reports taking 240 mg qd past 3 days with no improvement. BNP is 1701, up from 898 on 6/2 and 49 on 5/24. sCr is 1.8 with baseline ~ 1.5-1.7. sPO2 on RA is 93%. Wife at bedside    He has been given Lasix 80 mg IVP in the ED with plan to start Lasix gtt at 20 mg/hr           * LVAD (left ventricular assist device) present  - S/P DT HM3 with MVR plus Protek Duo for RV support 6/29 (Grecia)  - RVAD removed with concern for hemolysis.  - HTS primary   - GASTON trial   - INR therapeutic. Cont warfarin.   - LDH stable  - CVP 14. Cr rising. Will decrease IV Lasix gtt to 30 mg/hr.   - ECHO 7/12- AV does not open, IVS midline, moderate-severe reduced RVSF, LVEDD 5.02 with HM3 speed set to 5200 rpm (decreased from 5300 rpm the day prior)     Procedure: Device Interrogation Including analysis of device parameters  Current Settings: Ventricular Assist Device  Review of device function is stable    TXP LVAD INTERROGATIONS 7/18/2022 7/18/2022 7/18/2022 7/18/2022 7/18/2022 7/18/2022 7/18/2022   Type HeartMate3 " HeartMate3 HeartMate3 HeartMate3 HeartMate3 HeartMate3 HeartMate3   Flow 4.5 3.9 4.4 4.3 4.4 3.8 3.8   Speed 5200 5200 5200 5200 5200 5200 5200   PI 3.3 4.1 4.2 4.0 2.4 3.8 2.4   Power (Serna) 3.7 3.5 3.7 3.6 3.5 3.6 3.5   LSL - - 4800 - - - 4800   Pulsatility Intermittent pulse Intermittent pulse Intermittent pulse Intermittent pulse Intermittent pulse Intermittent pulse Intermittent pulse       Tingling in extremities  - Pt reports paresthesias in right toes and fingers x 3 days   - Possible radiculopathy vs electrolyte abnormality vs neurovascular etiology   - Consulted General Neurology, suspect compression from surgical positioning/carpal tunnel syndrome. Recommended brace to L hand/wrist. If no improvement, will need outpatient EMG/NCS    Hyponatremia  -Hypervolemic, now improved  -Discontinued salt tablets   -Improvement in Na with diuresis    Staphylococcus epidermidis bacteremia  - Blood cultures 6/20 and repeat 6/21 positive for Staph Epi. One of two blood cultures from 6/23 positive for Staph (taken prior to line exchange). Repeat cultures sent 6/25 NGTD  - IJ exchanged on 6/23, IABP exchanged 6/23  - ID recommended 14 day course of vancomycin from VAD implantation on 6/29 with end date: 7/12/22. Vancomycin discontinued per CTS with concerns for elevated sCr. ID with new recs to complete 7d course of daptomycin, which was completed 7/7/22    Uncontrolled diabetes mellitus  Admitted 5/24-5/27 with hyperglycemia hyperosmolar syndrome  - Hgb A1C 9.2 on 5/20/22  - Endocrine following, on insulin gtt    CKD (chronic kidney disease) stage 3, GFR 30-59 ml/min  SCr baseline ~ 1.5-1.7    Acute on chronic combined systolic and diastolic congestive heart failure, NYHA class 4  - NIDCM.  - Was not evaluated for OHTx as he quit smoking in April 2022.   - See LVAD    Uninterrupted Critical Care/Counseling Time (not including procedures): 45 min.       Jeff Spencer PA-C  Heart Transplant  [facility] - [facility] Intensive  Care

## 2022-07-18 NOTE — PLAN OF CARE
"      SICU PLAN OF CARE NOTE    Dx: LVAD (left ventricular assist device), prior RVAD    Shift Events: Pt's V/S stable over shift. Pt had 30-40 second run of V-Tach over shift, upon awakening pt - resolved spontaneously, see note for further details. Pt able to rest intermittently over shift, PRN and scheduled pain and sleep meds given, working well to control pt's pain. Pt has adequate UOP over shift. Plan to wean diuretic gtt as tolerated, further independence with PT/OT, possibly eval of pt's AICD, stepdown out of ICU possibly today. POC reviewed with pt and pt's significant other, all questions answered and encouraged.     Goals of Care: MAP 65-90     Neuro: AAO x4, Follows Commands and Moves All Extremities    Vital Signs: BP (!) 74/0 (BP Location: Left arm, Patient Position: Lying)   Pulse 87   Temp 98.5 °F (36.9 °C) (Oral)   Resp 15   Ht 6' 3" (1.905 m)   Wt 88 kg (194 lb 0.1 oz)   SpO2 100%   BMI 24.25 kg/m²      Cardiac: NSR - ST, HR 90-100s, CVP 12/12, SvO2 47/53     Respiratory: Room Air     Diet: Cardiac Diet and Diabetic Diet, 1500 FR     Gtts: Lasix     Urine Output: Voids Spontaneously 2675 cc/shift      VAD: HM3, Speed 5200, Flows 4.4-4.8, no alarms over shift     Labs: daily / Q12H K/Mg, 60 mEq of K given IVPB, 2g of Mg to be given IVPB / Accuchecks: ACHS     Skin: No skin breakdown noted over shift. Pt able to reposition independently, pillow support provided. Immerse bed plugged in, inflated, and working properly.       "

## 2022-07-18 NOTE — PROGRESS NOTES
07/18/2022  Casey Arizmendi    Current provider:  Josh Pulido MD    Device interrogation:  TXP LVAD INTERROGATIONS 7/18/2022 7/18/2022 7/18/2022 7/18/2022 7/18/2022 7/18/2022 7/18/2022   Type HeartMate3 HeartMate3 HeartMate3 HeartMate3 HeartMate3 HeartMate3 HeartMate3   Flow 4.3 4.4 3.8 3.8 4.4 4.5 4.5   Speed 5200 5200 5200 5200 5200 5200 5200   PI 4.0 2.4 3.8 2.4 3.8 4.0 3.8   Power (Serna) 3.6 3.5 3.6 3.5 3.7 3.8 3.7   LSL - - - 4800 - - -   Pulsatility Intermittent pulse Intermittent pulse Intermittent pulse Intermittent pulse Intermittent pulse Intermittent pulse Intermittent pulse          Rounded on Kevan Queen to ensure all mechanical assist device settings (IABP or VAD) were appropriate and all parameters were within limits.  I was able to ensure all back up equipment was present, the staff had no issues, and the Perfusion Department daily rounding was complete.      For implantable VADs: Interrogation of Ventricular assist device was performed with analysis of device parameters and review of device function. I have personally reviewed the interrogation findings and agree with findings as stated.     In emergency, the nursing units have been notified to contact the perfusion department either by:  Calling [phone] from 630am to 4pm Mon thru Fri, utilizing the On-Call Finder functionality of Epic and searching for Perfusion, or by contacting the hospital  from 4pm to 630am and on weekends and asking to speak with the perfusionist on call.    11:12 AM

## 2022-07-18 NOTE — PT/OT/SLP PROGRESS
"Physical Therapy Treatment    Patient Name:  Kevan Queen   MRN:  [id number]    Recommendations:     Discharge Recommendations:  home health PT   Discharge Equipment Recommendations: none   Barriers to discharge: None    Assessment:     Kevan Queen is a 37 y.o. male admitted with a medical diagnosis of LVAD (left ventricular assist device) present. Patient tolerated session fairly however activity limited this date due to dizziness with mobility. RN unable to get doppler BP prior to session however patient reports dizziness once sitting edge of bed, increased with standing and substernal chest "pressure" reported. Returned to seated EOB with brief improvement and then worsening of dizziness noted, RN notified of dizziness with mobility.     He presents with the following impairments/functional limitations: weakness, impaired endurance, impaired functional mobility, impaired balance, decreased upper extremity function, impaired cardiopulmonary response to activity. Once medically stable, recommending pt discharge to home health PT.    Rehab Prognosis: Good; patient continues to benefit from acute skilled PT services to address these deficits and reach maximum level of function.  Recent Surgery: Procedure(s) (LRB):  CLOSURE, WOUND, STERNUM (N/A)  INSERTION-RIGHT VENTRICULAR ASSIST DEVICE (Right)  APPLICATION, WOUND VAC (N/A)  IRRIGATION, MEDIASTINUM 18 Days Post-Op    Plan:     During this hospitalization, patient to be seen 5 x/week to address the identified rehab impairments via gait training, therapeutic activities, therapeutic exercises, neuromuscular re-education and progress toward the following goals:    · Plan of Care Expires:  07/01/22    Subjective     Chief Complaint: Dizziness with mobility  Patient/Family Comments/Goals: "It feels like it's trying to get me" referring to dizziness in standing  Pain/Comfort:  · Pain Rating 1: 0/10  · Location - Side 1: Left  · Location - Orientation 1: lateral  · Location " 1: rib(s)  · Pain Addressed 1: Cessation of Activity, Nurse notified  · Pain Rating Post-Intervention 1:  (not rated)    Objective:     Communicated with RN prior to session. Patient found HOB elevated with telemetry, pulse ox (continuous), LVAD, PICC line upon PT entry to room.     General Precautions: Standard, fall, LVAD, sternal   Orthopedic Precautions:N/A   Braces: N/A    Functional Mobility:  · Bed Mobility:     · Rolling Left:  stand by assistance  · Supine to Sit: stand by assistance  · Sit to Supine: contact guard assistance  · Transfers:     · Sit to Stand: contact guard assistance with no AD  · Gait: Patient ambulated 2 steps to the left with no AD and contact guard assistance. Patient demonstrates decreased step length and decreased william. All lines remained intact throughout ambulation trial.  · Balance:   · Static Sitting: Good, able to maintain for 5 minute(s) with stand by assistance  · Dynamic Sitting: not assessed this visit  · Static Standing: Good, able to maintain for 3 minute(s) with contact guard - minimum assistance, reported increase in dizziness  · Dynamic Standing: Fair: Patient accepts minimal challenge, contact guard assistance    AM-PAC 6 CLICK MOBILITY  Turning over in bed (including adjusting bedclothes, sheets and blankets)?: 3  Sitting down on and standing up from a chair with arms (e.g., wheelchair, bedside commode, etc.): 3  Moving from lying on back to sitting on the side of the bed?: 3  Moving to and from a bed to a chair (including a wheelchair)?: 3  Need to walk in hospital room?: 3  Climbing 3-5 steps with a railing?: 3  Basic Mobility Total Score: 18     Therapeutic Activities and Exercises:  Patient educated on role of acute care PT and PT POC  Increased time spent assessing dizziness symptoms and allowing time for RN to obtain MAP, symptoms not consistent with BPPV    Patient left HOB elevated with all lines intact, call button in reach and RN and significant other  present.    GOALS:   Multidisciplinary Problems     Physical Therapy Goals        Problem: Physical Therapy    Goal Priority Disciplines Outcome Goal Variances Interventions   Physical Therapy Goal     PT, PT/OT Ongoing, Progressing     Description: Goals to be met by: 2022    Patient will increase functional independence with mobility by performin. Supine to sit with MInimal Assistance -met   Updated: supervision  2. Sit to stand transfer with Minimal Assistance - met   2a. Sit to stand transfer with supervision- met   2b. Sit to stand with independence   3. Gait  x 50 feet with Contact Guard Assistance - met    3a. Gait 400 ft with Supervision                  Multidisciplinary Problems (Resolved)        Problem: Physical Therapy    Goal Priority Disciplines Outcome Goal Variances Interventions   Physical Therapy Goal   (Resolved)     PT, PT/OT Met     Description:     Problem: Physical Therapy  Goal: Physical Therapy Goal  Description: Goals to be met by: 2022     Patient will increase functional independence with mobility by performin. Lower extremity exercise program without IABP 30 reps per handout, with independence  2.Upper extremity exercise program 30 reps per handout, with independence                         Time Tracking:     PT Received On: 22  PT Start Time: 1320     PT Stop Time: 1343  PT Total Time (min): 23 min     Billable Minutes: Therapeutic Activity 23 min     Treatment Type: Treatment  PT/PTA: PT     PTA Visit Number: 0     2022

## 2022-07-18 NOTE — ASSESSMENT & PLAN NOTE
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  - HTS primary   - GASTON trial   - INR therapeutic. Cont warfarin.   - LDH stable  - CVP 10. Cr rising. Will decrease IV Lasix gtt to 30 mg/hr.   - ECHO 7/12- AV does not open, IVS midline, moderate-severe reduced RVSF, LVEDD 5.02 with HM3 speed set to 5200 rpm (decreased from 5300 rpm the day prior)     Procedure: Device Interrogation Including analysis of device parameters  Current Settings: Ventricular Assist Device  Review of device function is stable    TXP LVAD INTERROGATIONS 7/18/2022 7/18/2022 7/18/2022 7/18/2022 7/18/2022 7/18/2022 7/18/2022   Type HeartMate3 HeartMate3 HeartMate3 HeartMate3 HeartMate3 HeartMate3 HeartMate3   Flow 4.5 3.9 4.4 4.3 4.4 3.8 3.8   Speed 5200 5200 5200 5200 5200 5200 5200   PI 3.3 4.1 4.2 4.0 2.4 3.8 2.4   Power (Serna) 3.7 3.5 3.7 3.6 3.5 3.6 3.5   LSL - - 4800 - - - 4800   Pulsatility Intermittent pulse Intermittent pulse Intermittent pulse Intermittent pulse Intermittent pulse Intermittent pulse Intermittent pulse

## 2022-07-18 NOTE — SUBJECTIVE & OBJECTIVE
Interval History: NAEON. Patient says he gets lightheaded/ pre-syncopal each time he goes from a laying to sitting position. Doppler BP drops by 8 mmHg when going from laying to sitting.  Diuresing well on lasix 30 mg/hr. SvO2 53% CO 6.6 CI 3.0 . CVP remains elevated at 14 this AM. New echo pending and stepdown transfer orders in    Continuous Infusions:   sodium chloride 0.9% 5 mL/hr at 06/27/22 0055    sodium chloride 0.9% 5 mL/hr at 07/18/22 1100    furosemide (LASIX) 2 mg/mL continuous infusion (non-titrating) 30 mg/hr (07/18/22 1100)     Scheduled Meds:   acetaminophen  650 mg Oral Q8H    busPIRone  7.5 mg Oral Daily    famotidine  40 mg Oral Daily    ferrous gluconate  324 mg Oral Daily with breakfast    gabapentin  100 mg Oral TID    insulin aspart U-100  10 Units Subcutaneous TIDWM    insulin detemir U-100  22 Units Subcutaneous QHS    INV aspirin/placebo  100 mg Oral Daily    polyethylene glycol  17 g Oral Daily    potassium chloride  40 mEq Oral TID    sodium chloride 0.9%  10 mL Intravenous Q6H    tiZANidine  2 mg Oral Q8H    traZODone  50 mg Oral QHS     PRN Meds:albuterol sulfate, bisacodyL, dextrose 10%, dextrose 10%, docusate sodium, glucagon (human recombinant), glucose, glucose, insulin aspart U-100, magnesium sulfate IVPB, oxyCODONE, polyethylene glycol, sodium chloride 0.9%, sodium chloride 0.9%, Flushing PICC Protocol **AND** sodium chloride 0.9% **AND** sodium chloride 0.9%, traMADoL    Review of patient's allergies indicates:   Allergen Reactions    Aspirin Other (See Comments)     Mr. Thacker is enrolled in Dr. Paige's Alon Trial and cannot have any aspirin and/or aspirin-containing products. DO NOT cancel any orders for INV Aspirin 100 mg/Placebo. If you have questions, please contact Isabel @ [phone], [phone],[email or web address], secure chat or MS Teams message.    Bumex [bumetanide] Hives    Lactose Diarrhea     Other reaction(s): Abdominal distension, gaseous    Torsemide  Hives     Objective:     Vital Signs (Most Recent):  Temp: 98.4 °F (36.9 °C) (07/18/22 0701)  Pulse: 99 (07/18/22 1130)  Resp: (!) 21 (07/18/22 1130)  BP: (!) 76/0 (07/18/22 1100)  SpO2: 100 % (07/18/22 1100)   Vital Signs (24h Range):  Temp:  [98 °F (36.7 °C)-98.5 °F (36.9 °C)] 98.4 °F (36.9 °C)  Pulse:  [] 99  Resp:  [10-37] 21  SpO2:  [99 %-100 %] 100 %  BP: (64-87)/(0-54) 76/0     Patient Vitals for the past 72 hrs (Last 3 readings):   Weight   07/18/22 0556 87 kg (191 lb 12.8 oz)   07/17/22 0426 88 kg (194 lb 0.1 oz)   07/16/22 0402 92 kg (202 lb 13.2 oz)       Body mass index is 23.97 kg/m².      Intake/Output Summary (Last 24 hours) at 7/18/2022 1228  Last data filed at 7/18/2022 1100  Gross per 24 hour   Intake 2181.05 ml   Output 3575 ml   Net -1393.95 ml       Hemodynamic Parameters: CVP 10       Telemetry: ST    Physical Exam  Vitals and nursing note reviewed.   Constitutional:       Appearance: Normal appearance.   HENT:      Head: Normocephalic and atraumatic.   Eyes:      Extraocular Movements: Extraocular movements intact.      Conjunctiva/sclera: Conjunctivae normal.   Cardiovascular:      Rate and Rhythm: Regular rhythm. Tachycardia present.      Comments: Smooth VAD hum  Pulmonary:      Breath sounds: Normal breath sounds.   Abdominal:      Palpations: Abdomen is soft.   Musculoskeletal:         General: No swelling.      Cervical back: Neck supple.      Right lower leg: No edema.      Left lower leg: No edema.   Skin:     General: Skin is dry.      Capillary Refill: Capillary refill takes 2 to 3 seconds.   Neurological:      General: No focal deficit present.      Mental Status: He is alert and oriented to person, place, and time.      Motor: No weakness.   Psychiatric:         Mood and Affect: Mood normal.         Behavior: Behavior normal.         Thought Content: Thought content normal.         Judgment: Judgment normal.       Significant Labs:  CBC:  Recent Labs   Lab 07/16/22  2242  07/17/22 0317 07/18/22 0311   WBC 10.57 8.70 8.22   RBC 2.73* 2.84* 2.93*   HGB 6.9* 7.2* 7.5*   HCT 22.8* 24.0* 24.5*    433 461*   MCV 84 85 84   MCH 25.3* 25.4* 25.6*   MCHC 30.3* 30.0* 30.6*       BNP:  Recent Labs   Lab 07/13/22 0312 07/14/22  0333 07/18/22 0311   * 458* 408*       CMP:  Recent Labs   Lab 07/16/22  0250 07/16/22  1612 07/17/22 0317 07/17/22  1551 07/18/22  0115 07/18/22 0311   *  --  189*  --  190* 246*   CALCIUM 9.5  --  9.4  --  9.3 9.3   ALBUMIN 2.7*  --  2.8*  --   --  2.8*   PROT 7.6  --  7.5  --   --  7.7   *  --  130*  --  134* 132*   K 3.9   < > 3.4* 4.1 3.1* 3.1*   CO2 27  --  25  --  26 26   CL 91*  --  94*  --  98 96   BUN 15  --  18  --  20 19   CREATININE 1.6*  --  1.8*  --  1.5* 1.5*   ALKPHOS 156*  --  157*  --   --  156*   ALT 30  --  26  --   --  22   AST 39  --  35  --   --  34   BILITOT 0.9  --  1.1*  --   --  0.9    < > = values in this interval not displayed.        Coagulation:   Recent Labs   Lab 07/16/22 0250 07/17/22 0317 07/18/22 0311   INR 2.4* 3.1* 4.1*   APTT 32.4* 33.9* 35.4*       LDH:  Recent Labs   Lab 07/16/22 0250 07/17/22 0317 07/18/22 0311   * 332* 303*       Microbiology:  Microbiology Results (last 7 days)       ** No results found for the last 168 hours. **            I have reviewed all pertinent labs within the past 24 hours.    Estimated Creatinine Clearance: 80.6 mL/min (A) (based on SCr of 1.5 mg/dL (H)).    Diagnostic Results:  I have reviewed and interpreted all pertinent imaging results/findings within the past 24 hours.

## 2022-07-19 ENCOUNTER — DOCUMENTATION ONLY (OUTPATIENT)
Dept: ELECTROPHYSIOLOGY | Facility: CLINIC | Age: 37
End: 2022-07-19
Payer: MEDICAID

## 2022-07-19 LAB
ALBUMIN SERPL BCP-MCNC: 2.9 G/DL (ref 3.5–5.2)
ALLENS TEST: ABNORMAL
ALP SERPL-CCNC: 159 U/L (ref 55–135)
ALT SERPL W/O P-5'-P-CCNC: 19 U/L (ref 10–44)
ANION GAP SERPL CALC-SCNC: 11 MMOL/L (ref 8–16)
APTT BLDCRRT: 36 SEC (ref 21–32)
AST SERPL-CCNC: 29 U/L (ref 10–40)
BASOPHILS # BLD AUTO: 0.04 K/UL (ref 0–0.2)
BASOPHILS NFR BLD: 0.2 % (ref 0–1.9)
BILIRUB SERPL-MCNC: 1.1 MG/DL (ref 0.1–1)
BSA FOR ECHO PROCEDURE: 2.14 M2
BUN SERPL-MCNC: 17 MG/DL (ref 6–20)
CALCIUM SERPL-MCNC: 9.1 MG/DL (ref 8.7–10.5)
CHLORIDE SERPL-SCNC: 96 MMOL/L (ref 95–110)
CO2 SERPL-SCNC: 22 MMOL/L (ref 23–29)
CREAT SERPL-MCNC: 1.8 MG/DL (ref 0.5–1.4)
CV ECHO LV RWT: 0.33 CM
DELSYS: ABNORMAL
DIFFERENTIAL METHOD: ABNORMAL
ECHO LV POSTERIOR WALL: 1 CM (ref 0.6–1.1)
EJECTION FRACTION: 10 %
EOSINOPHIL # BLD AUTO: 0 K/UL (ref 0–0.5)
EOSINOPHIL NFR BLD: 0 % (ref 0–8)
ERYTHROCYTE [DISTWIDTH] IN BLOOD BY AUTOMATED COUNT: 18.6 % (ref 11.5–14.5)
EST. GFR  (AFRICAN AMERICAN): 54.4 ML/MIN/1.73 M^2
EST. GFR  (NON AFRICAN AMERICAN): 47 ML/MIN/1.73 M^2
FIBRINOGEN PPP-MCNC: 514 MG/DL (ref 182–400)
FRACTIONAL SHORTENING: 8 % (ref 28–44)
GLUCOSE SERPL-MCNC: 209 MG/DL (ref 70–110)
HCO3 UR-SCNC: 22.5 MMOL/L (ref 24–28)
HCT VFR BLD AUTO: 23.7 % (ref 40–54)
HGB BLD-MCNC: 7.4 G/DL (ref 14–18)
IMM GRANULOCYTES # BLD AUTO: 0.1 K/UL (ref 0–0.04)
IMM GRANULOCYTES NFR BLD AUTO: 0.5 % (ref 0–0.5)
INR PPP: 4.4 (ref 0.8–1.2)
INTERVENTRICULAR SEPTUM: 1 CM (ref 0.6–1.1)
LACTATE SERPL-SCNC: 0.9 MMOL/L (ref 0.5–2.2)
LDH SERPL L TO P-CCNC: 319 U/L (ref 110–260)
LEFT INTERNAL DIMENSION IN SYSTOLE: 5.5 CM (ref 2.1–4)
LEFT VENTRICLE DIASTOLIC VOLUME INDEX: 73.72 ML/M2
LEFT VENTRICLE DIASTOLIC VOLUME: 158.49 ML
LEFT VENTRICLE MASS INDEX: 115 G/M2
LEFT VENTRICLE SYSTOLIC VOLUME INDEX: 63.5 ML/M2
LEFT VENTRICLE SYSTOLIC VOLUME: 136.49 ML
LEFT VENTRICULAR INTERNAL DIMENSION IN DIASTOLE: 6 CM (ref 3.5–6)
LEFT VENTRICULAR MASS: 246.87 G
LYMPHOCYTES # BLD AUTO: 0.7 K/UL (ref 1–4.8)
LYMPHOCYTES NFR BLD: 3.6 % (ref 18–48)
MAGNESIUM SERPL-MCNC: 1.5 MG/DL (ref 1.6–2.6)
MAGNESIUM SERPL-MCNC: 2.2 MG/DL (ref 1.6–2.6)
MCH RBC QN AUTO: 24.7 PG (ref 27–31)
MCHC RBC AUTO-ENTMCNC: 31.2 G/DL (ref 32–36)
MCV RBC AUTO: 79 FL (ref 82–98)
MODE: ABNORMAL
MONOCYTES # BLD AUTO: 1 K/UL (ref 0.3–1)
MONOCYTES NFR BLD: 5.2 % (ref 4–15)
NEUTROPHILS # BLD AUTO: 18.2 K/UL (ref 1.8–7.7)
NEUTROPHILS NFR BLD: 90.5 % (ref 38–73)
NRBC BLD-RTO: 0 /100 WBC
PCO2 BLDA: 35.2 MMHG (ref 35–45)
PH SMN: 7.41 [PH] (ref 7.35–7.45)
PLATELET # BLD AUTO: 396 K/UL (ref 150–450)
PMV BLD AUTO: 8.8 FL (ref 9.2–12.9)
PO2 BLDA: 28 MMHG (ref 40–60)
POC BE: -2 MMOL/L
POC SATURATED O2: 54 % (ref 95–100)
POC TCO2: 24 MMOL/L (ref 24–29)
POCT GLUCOSE: 126 MG/DL (ref 70–110)
POCT GLUCOSE: 166 MG/DL (ref 70–110)
POCT GLUCOSE: 280 MG/DL (ref 70–110)
POTASSIUM SERPL-SCNC: 3.1 MMOL/L (ref 3.5–5.1)
POTASSIUM SERPL-SCNC: 3.6 MMOL/L (ref 3.5–5.1)
POTASSIUM SERPL-SCNC: 3.6 MMOL/L (ref 3.5–5.1)
PROT SERPL-MCNC: 7.8 G/DL (ref 6–8.4)
PROTHROMBIN TIME: 42 SEC (ref 9–12.5)
RA PRESSURE: 8 MMHG
RBC # BLD AUTO: 2.99 M/UL (ref 4.6–6.2)
RIGHT VENTRICULAR END-DIASTOLIC DIMENSION: 4.4 CM
SAMPLE: ABNORMAL
SINUS: 3 CM
SITE: ABNORMAL
SODIUM SERPL-SCNC: 129 MMOL/L (ref 136–145)
TRICUSPID ANNULAR PLANE SYSTOLIC EXCURSION: 0.9 CM
WBC # BLD AUTO: 20.07 K/UL (ref 3.9–12.7)

## 2022-07-19 PROCEDURE — 63600175 PHARM REV CODE 636 W HCPCS: Performed by: STUDENT IN AN ORGANIZED HEALTH CARE EDUCATION/TRAINING PROGRAM

## 2022-07-19 PROCEDURE — 82803 BLOOD GASES ANY COMBINATION: CPT

## 2022-07-19 PROCEDURE — 93750 PR INTERROGATE VENT ASSIST DEV, IN PERSON, W PHYSICIAN ANALYSIS: ICD-10-PCS | Mod: ,,, | Performed by: INTERNAL MEDICINE

## 2022-07-19 PROCEDURE — 84132 ASSAY OF SERUM POTASSIUM: CPT | Performed by: INTERNAL MEDICINE

## 2022-07-19 PROCEDURE — 25000003 PHARM REV CODE 250: Performed by: ANESTHESIOLOGY

## 2022-07-19 PROCEDURE — 25000003 PHARM REV CODE 250: Performed by: INTERNAL MEDICINE

## 2022-07-19 PROCEDURE — 25000003 PHARM REV CODE 250: Performed by: THORACIC SURGERY (CARDIOTHORACIC VASCULAR SURGERY)

## 2022-07-19 PROCEDURE — 99900035 HC TECH TIME PER 15 MIN (STAT)

## 2022-07-19 PROCEDURE — 99233 SBSQ HOSP IP/OBS HIGH 50: CPT | Mod: ,,, | Performed by: INTERNAL MEDICINE

## 2022-07-19 PROCEDURE — 25000003 PHARM REV CODE 250: Performed by: STUDENT IN AN ORGANIZED HEALTH CARE EDUCATION/TRAINING PROGRAM

## 2022-07-19 PROCEDURE — 94799 UNLISTED PULMONARY SVC/PX: CPT

## 2022-07-19 PROCEDURE — 83615 LACTATE (LD) (LDH) ENZYME: CPT | Performed by: THORACIC SURGERY (CARDIOTHORACIC VASCULAR SURGERY)

## 2022-07-19 PROCEDURE — 97116 GAIT TRAINING THERAPY: CPT

## 2022-07-19 PROCEDURE — 63600175 PHARM REV CODE 636 W HCPCS: Performed by: PHYSICIAN ASSISTANT

## 2022-07-19 PROCEDURE — 63600150 PHARM REV CODE 636: Performed by: NURSE PRACTITIONER

## 2022-07-19 PROCEDURE — 20600001 HC STEP DOWN PRIVATE ROOM

## 2022-07-19 PROCEDURE — 99232 SBSQ HOSP IP/OBS MODERATE 35: CPT | Mod: ,,, | Performed by: NURSE PRACTITIONER

## 2022-07-19 PROCEDURE — 25000003 PHARM REV CODE 250: Performed by: PHYSICIAN ASSISTANT

## 2022-07-19 PROCEDURE — 25000003 PHARM REV CODE 250

## 2022-07-19 PROCEDURE — A4216 STERILE WATER/SALINE, 10 ML: HCPCS | Performed by: THORACIC SURGERY (CARDIOTHORACIC VASCULAR SURGERY)

## 2022-07-19 PROCEDURE — 99291 PR CRITICAL CARE, E/M 30-74 MINUTES: ICD-10-PCS | Mod: ,,, | Performed by: PHYSICIAN ASSISTANT

## 2022-07-19 PROCEDURE — 85384 FIBRINOGEN ACTIVITY: CPT | Performed by: STUDENT IN AN ORGANIZED HEALTH CARE EDUCATION/TRAINING PROGRAM

## 2022-07-19 PROCEDURE — 85610 PROTHROMBIN TIME: CPT | Performed by: PHYSICIAN ASSISTANT

## 2022-07-19 PROCEDURE — 80053 COMPREHEN METABOLIC PANEL: CPT

## 2022-07-19 PROCEDURE — 97110 THERAPEUTIC EXERCISES: CPT

## 2022-07-19 PROCEDURE — 83605 ASSAY OF LACTIC ACID: CPT | Performed by: STUDENT IN AN ORGANIZED HEALTH CARE EDUCATION/TRAINING PROGRAM

## 2022-07-19 PROCEDURE — 27000248 HC VAD-ADDITIONAL DAY

## 2022-07-19 PROCEDURE — 93750 INTERROGATION VAD IN PERSON: CPT | Mod: ,,, | Performed by: INTERNAL MEDICINE

## 2022-07-19 PROCEDURE — 83735 ASSAY OF MAGNESIUM: CPT | Performed by: INTERNAL MEDICINE

## 2022-07-19 PROCEDURE — 99233 PR SUBSEQUENT HOSPITAL CARE,LEVL III: ICD-10-PCS | Mod: ,,, | Performed by: INTERNAL MEDICINE

## 2022-07-19 PROCEDURE — 63600175 PHARM REV CODE 636 W HCPCS: Performed by: NURSE PRACTITIONER

## 2022-07-19 PROCEDURE — 85025 COMPLETE CBC W/AUTO DIFF WBC: CPT | Performed by: STUDENT IN AN ORGANIZED HEALTH CARE EDUCATION/TRAINING PROGRAM

## 2022-07-19 PROCEDURE — 99232 PR SUBSEQUENT HOSPITAL CARE,LEVL II: ICD-10-PCS | Mod: ,,, | Performed by: NURSE PRACTITIONER

## 2022-07-19 PROCEDURE — 25000003 PHARM REV CODE 250: Performed by: NURSE PRACTITIONER

## 2022-07-19 PROCEDURE — 87040 BLOOD CULTURE FOR BACTERIA: CPT | Performed by: STUDENT IN AN ORGANIZED HEALTH CARE EDUCATION/TRAINING PROGRAM

## 2022-07-19 PROCEDURE — 85730 THROMBOPLASTIN TIME PARTIAL: CPT | Performed by: STUDENT IN AN ORGANIZED HEALTH CARE EDUCATION/TRAINING PROGRAM

## 2022-07-19 PROCEDURE — 99291 CRITICAL CARE FIRST HOUR: CPT | Mod: ,,, | Performed by: PHYSICIAN ASSISTANT

## 2022-07-19 RX ORDER — POTASSIUM CHLORIDE 29.8 MG/ML
40 INJECTION INTRAVENOUS ONCE
Status: COMPLETED | OUTPATIENT
Start: 2022-07-19 | End: 2022-07-19

## 2022-07-19 RX ORDER — MAGNESIUM SULFATE HEPTAHYDRATE 40 MG/ML
2 INJECTION, SOLUTION INTRAVENOUS ONCE
Status: COMPLETED | OUTPATIENT
Start: 2022-07-19 | End: 2022-07-19

## 2022-07-19 RX ADMIN — TIZANIDINE 2 MG: 2 TABLET ORAL at 02:07

## 2022-07-19 RX ADMIN — VANCOMYCIN HYDROCHLORIDE 1250 MG: 1.25 INJECTION, POWDER, LYOPHILIZED, FOR SOLUTION INTRAVENOUS at 12:07

## 2022-07-19 RX ADMIN — TIZANIDINE 2 MG: 2 TABLET ORAL at 09:07

## 2022-07-19 RX ADMIN — POTASSIUM CHLORIDE 40 MEQ: 20 TABLET, EXTENDED RELEASE ORAL at 08:07

## 2022-07-19 RX ADMIN — FAMOTIDINE 40 MG: 20 TABLET ORAL at 08:07

## 2022-07-19 RX ADMIN — MAGNESIUM SULFATE HEPTAHYDRATE 2 G: 2 INJECTION, SOLUTION INTRAVENOUS at 12:07

## 2022-07-19 RX ADMIN — CEFEPIME 1 G: 1 INJECTION, POWDER, FOR SOLUTION INTRAMUSCULAR; INTRAVENOUS at 12:07

## 2022-07-19 RX ADMIN — CEFEPIME 1 G: 1 INJECTION, POWDER, FOR SOLUTION INTRAMUSCULAR; INTRAVENOUS at 07:07

## 2022-07-19 RX ADMIN — INSULIN ASPART 1 UNITS: 100 INJECTION, SOLUTION INTRAVENOUS; SUBCUTANEOUS at 09:07

## 2022-07-19 RX ADMIN — ACETAMINOPHEN 650 MG: 325 TABLET ORAL at 02:07

## 2022-07-19 RX ADMIN — ACETAMINOPHEN 650 MG: 325 TABLET ORAL at 05:07

## 2022-07-19 RX ADMIN — CEFEPIME 1 G: 1 INJECTION, POWDER, FOR SOLUTION INTRAMUSCULAR; INTRAVENOUS at 02:07

## 2022-07-19 RX ADMIN — INSULIN ASPART 10 UNITS: 100 INJECTION, SOLUTION INTRAVENOUS; SUBCUTANEOUS at 05:07

## 2022-07-19 RX ADMIN — INSULIN DETEMIR 22 UNITS: 100 INJECTION, SOLUTION SUBCUTANEOUS at 09:07

## 2022-07-19 RX ADMIN — TIZANIDINE 2 MG: 2 TABLET ORAL at 05:07

## 2022-07-19 RX ADMIN — MAGNESIUM SULFATE HEPTAHYDRATE 2 G: 40 INJECTION, SOLUTION INTRAVENOUS at 03:07

## 2022-07-19 RX ADMIN — POTASSIUM CHLORIDE 40 MEQ: 29.8 INJECTION, SOLUTION INTRAVENOUS at 03:07

## 2022-07-19 RX ADMIN — GABAPENTIN 100 MG: 100 CAPSULE ORAL at 08:07

## 2022-07-19 RX ADMIN — VANCOMYCIN HYDROCHLORIDE 2250 MG: 1.25 INJECTION, POWDER, LYOPHILIZED, FOR SOLUTION INTRAVENOUS at 12:07

## 2022-07-19 RX ADMIN — INSULIN ASPART 2 UNITS: 100 INJECTION, SOLUTION INTRAVENOUS; SUBCUTANEOUS at 12:07

## 2022-07-19 RX ADMIN — BUSPIRONE HYDROCHLORIDE 7.5 MG: 5 TABLET ORAL at 05:07

## 2022-07-19 RX ADMIN — GABAPENTIN 100 MG: 100 CAPSULE ORAL at 02:07

## 2022-07-19 RX ADMIN — Medication 10 ML: at 06:07

## 2022-07-19 RX ADMIN — FUROSEMIDE 30 MG/HR: 10 INJECTION, SOLUTION INTRAMUSCULAR; INTRAVENOUS at 06:07

## 2022-07-19 RX ADMIN — ACETAMINOPHEN 650 MG: 325 TABLET ORAL at 09:07

## 2022-07-19 RX ADMIN — Medication 324 MG: at 07:07

## 2022-07-19 RX ADMIN — POTASSIUM CHLORIDE 40 MEQ: 20 TABLET, EXTENDED RELEASE ORAL at 02:07

## 2022-07-19 RX ADMIN — CEFEPIME 1 G: 1 INJECTION, POWDER, FOR SOLUTION INTRAMUSCULAR; INTRAVENOUS at 11:07

## 2022-07-19 RX ADMIN — Medication 10 ML: at 12:07

## 2022-07-19 RX ADMIN — FUROSEMIDE 30 MG/HR: 10 INJECTION, SOLUTION INTRAMUSCULAR; INTRAVENOUS at 12:07

## 2022-07-19 RX ADMIN — INSULIN ASPART 10 UNITS: 100 INJECTION, SOLUTION INTRAVENOUS; SUBCUTANEOUS at 12:07

## 2022-07-19 RX ADMIN — TRAZODONE HYDROCHLORIDE 50 MG: 50 TABLET ORAL at 08:07

## 2022-07-19 RX ADMIN — INSULIN ASPART 3 UNITS: 100 INJECTION, SOLUTION INTRAVENOUS; SUBCUTANEOUS at 02:07

## 2022-07-19 NOTE — SUBJECTIVE & OBJECTIVE
Past Medical History:   Diagnosis Date    Arthritis     Cardiomyopathy     CHF (congestive heart failure) 10/01/2020    Diabetes mellitus     Dilated cardiomyopathy 10/26/2020    Drug abuse 10/2020    Hyperosmolar hyperglycemic state (HHS) 5/25/2022    ICD (implantable cardioverter-defibrillator) in place 10/26/2020    Muscle cramping 6/15/2022    Renal disorder        Past Surgical History:   Procedure Laterality Date    APPLICATION OF WOUND VACUUM-ASSISTED CLOSURE DEVICE N/A 6/30/2022    Procedure: APPLICATION, WOUND VAC;  Surgeon: Luis F Paige MD;  Location: [facility] OR [address] [place] [address];  Service: Cardiovascular;  Laterality: N/A;  50 x 5 cm    CARDIAC DEFIBRILLATOR PLACEMENT      IMPLANTATION OF RIGHT VENTRICULAR ASSIST DEVICE (RVAD) N/A 6/29/2022    Procedure: INSERTION, RVAD;  Surgeon: Luis F Paige MD;  [facility];  Service: Cardiovascular;  Laterality: N/A;    INSERTION OF GRAFT TO PERICARDIUM Right 6/30/2022    Procedure: INSERTION-RIGHT VENTRICULAR ASSIST DEVICE;  Surgeon: Luis F Paige MD;  [address];  Service: Cardiovascular;  Laterality: Right;    IRRIGATION OF MEDIASTINUM  6/30/2022    Procedure: IRRIGATION, MEDIASTINUM;  Surgeon: Luis F Paige MD;  [facility];  Service: Cardiovascular;;    LEFT VENTRICULAR ASSIST DEVICE Left 6/23/2022    Procedure: INSERTION-LEFT VENTRICULAR ASSIST DEVICE;  Surgeon: Luis F Paige MD;  [facility];  Service: Cardiovascular;  Laterality: Left;    LEFT VENTRICULAR ASSIST DEVICE N/A 6/29/2022    Procedure: INSERTION-LEFT VENTRICULAR ASSIST DEVICE;  Surgeon: Luis F Paige MD;  [facility];  Service: Cardiovascular;  Laterality: N/A;    RIGHT HEART CATHETERIZATION Right 4/8/2022    Procedure: INSERTION, CATHETER, RIGHT HEART;  Surgeon: Luca Lopez Jr., MD;  Location: [facility] CATH LAB;  Service: Cardiology;  Laterality: Right;    RIGHT HEART CATHETERIZATION Right 4/19/2022    Procedure: INSERTION,  CATHETER, RIGHT HEART;  Surgeon: Josh Pulido MD;  Location: [facility] CATH LAB;  Service: Cardiology;  Laterality: Right;    STERNAL WOUND CLOSURE N/A 6/30/2022    Procedure: CLOSURE, WOUND, STERNUM;  Surgeon: Luis F Paige MD;  Location: [facility] OR [facility];  Service: Cardiovascular;  Laterality: N/A;       Review of patient's allergies indicates:   Allergen Reactions    Aspirin Other (See Comments)     Mr. Thacker is enrolled in Dr. Paige's Alon Trial and cannot have any aspirin and/or aspirin-containing products. DO NOT cancel any orders for INV Aspirin 100 mg/Placebo. If you have questions, please contact Isabel @ [phone], [phone],[email or web address], secure chat or MS Teams message.    Bumex [bumetanide] Hives    Lactose Diarrhea     Other reaction(s): Abdominal distension, gaseous    Torsemide Hives       Medications:  Medications Prior to Admission   Medication Sig    busPIRone (BUSPAR) 7.5 MG tablet Take 7.5 mg by mouth every 12 (twelve) hours.    EScitalopram oxalate (LEXAPRO) 5 MG Tab Take 1 tablet (5 mg total) by mouth once daily.    furosemide (LASIX) 80 MG tablet Take 80 mg by mouth 2 (two) times daily.    insulin aspart U-100 (NOVOLOG) 100 unit/mL (3 mL) InPn pen Inject 12 Units into the skin 3 (three) times daily.    insulin detemir U-100 (LEVEMIR FLEXTOUCH) 100 unit/mL (3 mL) SubQ InPn pen Inject 23 Units into the skin once daily.    mirtazapine (REMERON) 15 MG tablet Take 15 mg by mouth nightly.    pantoprazole (PROTONIX) 40 MG tablet Take 1 tablet (40 mg total) by mouth once daily.    potassium chloride SA (K-DUR,KLOR-CON) 20 MEQ tablet Take 2 tablets (40 mEq total) by mouth once daily.    sucralfate (CARAFATE) 1 gram tablet Take 1 g by mouth nightly.    albuterol (PROVENTIL/VENTOLIN HFA) 90 mcg/actuation inhaler INHALE 2 PUFFS BY MOUTH EVERY 4 HOURS AS NEEDED FOR COUGH OR WHEEZING    blood sugar diagnostic Strp Use to test blood glucose 4 (four) times daily.    blood-glucose meter  "Misc Use as instructed    lancets 33 gauge Misc Use to test blood glucose 4 (four) times daily.    milrinone (PRIMACOR) 1 mg/mL injection Inject into the vein.    milrinone 20mg/100ml D5W, 200mcg/ml, (PRIMACOR) 20 mg/100 mL (200 mcg/mL) infusion Inject 34.875 mcg/min into the vein continuous.    pen needle, diabetic 31 gauge x 1/4" Ndle 1 Device by Misc.(Non-Drug; Combo Route) route 4 (four) times daily.    promethazine (PHENERGAN) 12.5 MG Tab Take 1 tablet (12.5 mg total) by mouth every 6 (six) hours as needed (nausea).    traMADoL (ULTRAM) 50 mg tablet Take 1 tablet (50 mg total) by mouth every 6 (six) hours as needed for Pain.     Antibiotics (From admission, onward)                Start     Stop Route Frequency Ordered    07/18/22 2300  cefepime in dextrose 5 % 1 gram/50 mL IVPB 1 g         -- IV Every 8 hours (non-standard times) 07/18/22 2152 07/18/22 2252  vancomycin - pharmacy to dose  (vancomycin IVPB)        "And" Linked Group Details    -- IV pharmacy to manage frequency 07/18/22 2152 06/21/22 0445  vancomycin 1.25 g in dextrose 5% 250 mL IVPB (ready to mix)  (vancomycin IVPB)         06/21 1644 IV Once pre-op 06/21/22 0436    06/21/22 0445  cefepime in dextrose 5 % IVPB 2 g         06/21 1644 IV Once pre-op 06/21/22 0436          Antifungals (From admission, onward)                None          Antivirals (From admission, onward)      None             Immunization History   Administered Date(s) Administered    COVID-19, MRNA, LN-S, PF (Pfizer) (Purple Cap) 12/08/2021, 12/29/2021       Family History       Problem Relation (Age of Onset)    Diverticulosis Brother    Heart attack Maternal Grandmother, Maternal Grandfather    Heart failure Father          Social History     Socioeconomic History    Marital status: Legally    Tobacco Use    Smoking status: Former Smoker     Packs/day: 0.50     Years: 16.00     Pack years: 8.00     Start date: 10/1/2004     Quit date: 4/23/2021     Years since " quittin.2    Smokeless tobacco: Never Used   Substance and Sexual Activity    Alcohol use: Not Currently    Drug use: Not Currently     Types: Marijuana, MDMA (Ecstacy)    Sexual activity: Yes     Partners: Female     Birth control/protection: None     Review of Systems   Constitutional:  Negative for activity change, appetite change, chills and fever.   HENT:  Negative for congestion, dental problem, ear pain and rhinorrhea.    Eyes:  Negative for pain and discharge.   Respiratory:  Negative for cough and shortness of breath.    Cardiovascular:  Negative for chest pain and leg swelling.   Gastrointestinal:  Negative for abdominal distention, abdominal pain, constipation, diarrhea, nausea and vomiting.   Genitourinary:  Negative for difficulty urinating and dysuria.   Musculoskeletal:  Negative for arthralgias, back pain and joint swelling.   Skin:  Negative for rash and wound.   Neurological:  Negative for dizziness, light-headedness and headaches.   Psychiatric/Behavioral:  Negative for behavioral problems and confusion.    Objective:     Vital Signs (Most Recent):  Temp: 97.6 °F (36.4 °C) (22 1639)  Pulse: 80 (22 1639)  Resp: 20 (22 1639)  BP: (!) 84/0 (22 1639)  SpO2: 99 % (22 163)   Vital Signs (24h Range):  Temp:  [97.2 °F (36.2 °C)-99.8 °F (37.7 °C)] 97.6 °F (36.4 °C)  Pulse:  [] 80  Resp:  [14-42] 20  SpO2:  [95 %-99 %] 99 %  BP: (80-90)/(0-72) 84/0     Weight: 86.6 kg (191 lb)  Body mass index is 23.87 kg/m².    Estimated Creatinine Clearance: 67.2 mL/min (A) (based on SCr of 1.8 mg/dL (H)).    Physical Exam  Constitutional:       General: He is not in acute distress.     Appearance: Normal appearance. He is well-developed. He is not ill-appearing or diaphoretic.   HENT:      Head: Normocephalic and atraumatic.      Right Ear: External ear normal.      Left Ear: External ear normal.      Nose: Nose normal.   Eyes:      General: No scleral icterus.        Right eye:  No discharge.         Left eye: No discharge.      Extraocular Movements: Extraocular movements intact.      Conjunctiva/sclera: Conjunctivae normal.   Pulmonary:      Effort: Pulmonary effort is normal. No respiratory distress.      Breath sounds: No stridor.      Comments: Subxypoid chest tube site clean and dry, no drainage  DLES dressing in place  Abdominal:      General: Abdomen is flat. There is no distension.      Palpations: Abdomen is soft.      Tenderness: There is no abdominal tenderness.   Musculoskeletal:         General: No swelling.   Skin:     General: Skin is dry.      Coloration: Skin is not jaundiced or pale.      Findings: No erythema.   Neurological:      General: No focal deficit present.      Mental Status: He is alert and oriented to person, place, and time. Mental status is at baseline.   Psychiatric:         Mood and Affect: Mood normal.         Behavior: Behavior normal.         Thought Content: Thought content normal.         Judgment: Judgment normal.       Significant Labs: CBC:   Recent Labs   Lab 07/18/22  1708 07/18/22 2021 07/19/22 0228   WBC 9.34 20.78* 20.07*   HGB 7.4* 7.3* 7.4*   HCT 24.5* 23.1* 23.7*   * 390 396     CMP:   Recent Labs   Lab 07/18/22  0311 07/18/22  1708 07/19/22 0228   * 130* 129*   K 3.1* 3.5  3.5 3.6  3.6   CL 96 96 96   CO2 26 24 22*   * 159* 209*   BUN 19 17 17   CREATININE 1.5* 1.5* 1.8*   CALCIUM 9.3 9.3 9.1   PROT 7.7  --  7.8   ALBUMIN 2.8*  --  2.9*   BILITOT 0.9  --  1.1*   ALKPHOS 156*  --  159*   AST 34  --  29   ALT 22  --  19   ANIONGAP 10 10 11   EGFRNONAA 58.6* 58.6* 47.0*     Microbiology Results (last 7 days)       Procedure Component Value Units Date/Time    Blood culture [[id number]] Collected: 07/19/22 0006    Order Status: Completed Specimen: Blood from Peripheral, Antecubital, Left Updated: 07/19/22 0915     Blood Culture, Routine No Growth to date            Significant Imaging: I have reviewed all pertinent  imaging results/findings within the past 24 hours.

## 2022-07-19 NOTE — SUBJECTIVE & OBJECTIVE
Interval History: Yesterday afternoon driveline was removed by patient in attempt to de tangle his lines. Regained consciousness quickly upon driveline being reinserted by RN. RN estimates the VAD was off for 30-45 seconds. Otherwise uneventful night. Patient was experiencing chills overnight. He is afebrile, but his WCC spiked to 20K. INR remains supratherapeutic at 4.4 despite holding coumadin.  initated yesterday for RV dysfunction. HM3 now running at decreased speed for 5100 rpm. Will repeat a limited echo this afternoon to look at BiV function as well as LV diameter. Cultures drawn overnight for WCC which are NGTD. Cefepime and Vanc running in meantime and ID consulted.     AM hemodynamics: CVP 11, CO 6.6, CI 3.1, .     Continuous Infusions:   sodium chloride 0.9% 5 mL/hr at 06/27/22 0055    sodium chloride 0.9% Stopped (07/18/22 2000)    DOBUTamine IV infusion (non-titrating) 2.5 mcg/kg/min (07/19/22 1000)    furosemide (LASIX) 2 mg/mL continuous infusion (non-titrating) 30 mg/hr (07/19/22 1000)     Scheduled Meds:   acetaminophen  650 mg Oral Q8H    busPIRone  7.5 mg Oral Daily    ceFEPime (MAXIPIME) IVPB  1 g Intravenous Q8H    famotidine  40 mg Oral Daily    ferrous gluconate  324 mg Oral Daily with breakfast    gabapentin  100 mg Oral TID    insulin aspart U-100  10 Units Subcutaneous TIDWM    insulin detemir U-100  22 Units Subcutaneous QHS    INV aspirin/placebo  100 mg Oral Daily    polyethylene glycol  17 g Oral Daily    potassium chloride  40 mEq Oral TID    sodium chloride 0.9%  10 mL Intravenous Q6H    tiZANidine  2 mg Oral Q8H    traZODone  50 mg Oral QHS    vancomycin (VANCOCIN) IVPB  1,250 mg Intravenous Q12H     PRN Meds:albuterol sulfate, bisacodyL, dextrose 10%, dextrose 10%, docusate sodium, glucagon (human recombinant), glucose, glucose, insulin aspart U-100, insulin aspart U-100, magnesium sulfate IVPB, ondansetron, oxyCODONE, polyethylene glycol, promethazine (PHENERGAN) IVPB,  sodium chloride 0.9%, sodium chloride 0.9%, Flushing PICC Protocol **AND** sodium chloride 0.9% **AND** sodium chloride 0.9%, traMADoL, Pharmacy to dose Vancomycin consult **AND** vancomycin - pharmacy to dose    Review of patient's allergies indicates:   Allergen Reactions    Aspirin Other (See Comments)     Mr. Thacker is enrolled in Dr. Paige's Alon Trial and cannot have any aspirin and/or aspirin-containing products. DO NOT cancel any orders for INV Aspirin 100 mg/Placebo. If you have questions, please contact Isabel @ [phone], [phone],[email or web address], secure chat or MS Teams message.    Bumex [bumetanide] Hives    Lactose Diarrhea     Other reaction(s): Abdominal distension, gaseous    Torsemide Hives     Objective:     Vital Signs (Most Recent):  Temp: 97.8 °F (36.6 °C) (07/19/22 1025)  Pulse: 90 (07/19/22 1025)  Resp: 18 (07/19/22 1025)  BP: (!) 82/0 (07/19/22 1025)  SpO2: 95 % (07/19/22 1025)   Vital Signs (24h Range):  Temp:  [97.8 °F (36.6 °C)-99.8 °F (37.7 °C)] 97.8 °F (36.6 °C)  Pulse:  [] 90  Resp:  [11-48] 18  SpO2:  [95 %-99 %] 95 %  BP: (78-84)/(0) 82/0     Patient Vitals for the past 72 hrs (Last 3 readings):   Weight   07/18/22 1130 86.6 kg (191 lb)   07/18/22 0556 87 kg (191 lb 12.8 oz)   07/17/22 0426 88 kg (194 lb 0.1 oz)       Body mass index is 23.87 kg/m².      Intake/Output Summary (Last 24 hours) at 7/19/2022 1118  Last data filed at 7/19/2022 1000  Gross per 24 hour   Intake 1685.11 ml   Output 1575 ml   Net 110.11 ml       Hemodynamic Parameters: CVP 11       Telemetry: ST    Physical Exam  Vitals and nursing note reviewed.   Constitutional:       Appearance: Normal appearance.   HENT:      Head: Normocephalic and atraumatic.   Eyes:      Extraocular Movements: Extraocular movements intact.      Conjunctiva/sclera: Conjunctivae normal.   Cardiovascular:      Rate and Rhythm: Regular rhythm. Tachycardia present.      Comments: Smooth VAD hum  Pulmonary:      Breath  sounds: Normal breath sounds.   Abdominal:      Palpations: Abdomen is soft.   Musculoskeletal:         General: No swelling.      Cervical back: Neck supple.      Right lower leg: No edema.      Left lower leg: No edema.   Skin:     General: Skin is dry.      Capillary Refill: Capillary refill takes 2 to 3 seconds.   Neurological:      General: No focal deficit present.      Mental Status: He is alert and oriented to person, place, and time.      Motor: No weakness.   Psychiatric:         Mood and Affect: Mood normal.         Behavior: Behavior normal.         Thought Content: Thought content normal.         Judgment: Judgment normal.       Significant Labs:  CBC:  Recent Labs   Lab 07/18/22 1708 07/18/22 2021 07/19/22 0228   WBC 9.34 20.78* 20.07*   RBC 2.89* 2.90* 2.99*   HGB 7.4* 7.3* 7.4*   HCT 24.5* 23.1* 23.7*   * 390 396   MCV 85 80* 79*   MCH 25.6* 25.2* 24.7*   MCHC 30.2* 31.6* 31.2*       BNP:  Recent Labs   Lab 07/13/22  0312 07/14/22  0333 07/18/22  0311   * 458* 408*       CMP:  Recent Labs   Lab 07/17/22  0317 07/17/22  1551 07/18/22 0311 07/18/22 1708 07/19/22 0228   *   < > 246* 159* 209*   CALCIUM 9.4   < > 9.3 9.3 9.1   ALBUMIN 2.8*  --  2.8*  --  2.9*   PROT 7.5  --  7.7  --  7.8   *   < > 132* 130* 129*   K 3.4*   < > 3.1* 3.5  3.5 3.6  3.6   CO2 25   < > 26 24 22*   CL 94*   < > 96 96 96   BUN 18   < > 19 17 17   CREATININE 1.8*   < > 1.5* 1.5* 1.8*   ALKPHOS 157*  --  156*  --  159*   ALT 26  --  22  --  19   AST 35  --  34  --  29   BILITOT 1.1*  --  0.9  --  1.1*    < > = values in this interval not displayed.        Coagulation:   Recent Labs   Lab 07/17/22 0317 07/18/22 0311 07/19/22 0228   INR 3.1* 4.1* 4.4*   APTT 33.9* 35.4* 36.0*       LDH:  Recent Labs   Lab 07/17/22 0317 07/18/22 0311 07/19/22 0228   * 303* 319*       Microbiology:  Microbiology Results (last 7 days)       Procedure Component Value Units Date/Time    Blood culture  [[id number]] Collected: 07/19/22 0006    Order Status: Completed Specimen: Blood from Peripheral, Antecubital, Left Updated: 07/19/22 0915     Blood Culture, Routine No Growth to date            I have reviewed all pertinent labs within the past 24 hours.    Estimated Creatinine Clearance: 67.2 mL/min (A) (based on SCr of 1.8 mg/dL (H)).    Diagnostic Results:  I have reviewed and interpreted all pertinent imaging results/findings within the past 24 hours.

## 2022-07-19 NOTE — PROGRESS NOTES
[facility] - Cardiology Stepdown  Infectious Disease  Progress Note    Patient Name: Kevan Queen  MRN: [id number]  Admission Date: 6/13/2022  Length of Stay: 36 days  Attending Physician: Josh Pulido MD  Primary Care Provider: ORALIA Cline    Isolation Status: No active isolations  Assessment/Plan:      * LVAD (left ventricular assist device) present  37M s/p LVAD on 6/29, inadvertantly unplugged LVAD yesterday after he got tangled in lines, then developed chills. No documented fever. Started on empiric vanc and cefepime. Blood cx pending. No drainage from DLES, surgical site healing welll    Recommendations:  - requested RN take picture of DLES during next dressing change for review  - continue current vanc and cefepime  - if ongoing symptoms, will evaluate further, however pt states he is feeling well today    Will follow        Anticipated Disposition: TBD    Thank you for your consult. I will follow-up with patient. Please contact us if you have any additional questions.    Nya Durant DO  Critical Care Infectious Disease    Time: 35 minutes   50% of time spent on face-to-face counseling and coordination of care. Counseling included review of test results, diagnosis, and treatment plan with patient and/or family.        Subjective:     Principal Problem:LVAD (left ventricular assist device) present    HPI: 37-year-old male with history of NIDCM with BiV systolic heart failure on home milrinone presented 6/13/2022 for ADHF. Patient not current candidate for OHT as recently quit smoking 4/2022. IABP placed 6/13/2022, central line exchanged 6/20/2022. Blood culture x1 drawn on 6/20/2022, returned with GPC. Repeat blood cultures drawn 6/21/2022 x2. Patient reports tenderness around right femoral IABP - started once IABP was placed. Denied any worsening pain around right femoral line.    Called back 7/19 - LVAD placed 6/29, pt developed chills after unplugging his LVAD. Denies any abd pain. Family  endorses some serosanguinous drainage from prior chest tube site. No drainage from DLES per patient. Denies fevers, and states he feels fine today. Empirically started on vanc and cefepime.     Past Medical History:   Diagnosis Date    Arthritis     Cardiomyopathy     CHF (congestive heart failure) 10/01/2020    Diabetes mellitus     Dilated cardiomyopathy 10/26/2020    Drug abuse 10/2020    Hyperosmolar hyperglycemic state (HHS) 5/25/2022    ICD (implantable cardioverter-defibrillator) in place 10/26/2020    Muscle cramping 6/15/2022    Renal disorder        Past Surgical History:   Procedure Laterality Date    APPLICATION OF WOUND VACUUM-ASSISTED CLOSURE DEVICE N/A 6/30/2022    Procedure: APPLICATION, WOUND VAC;  Surgeon: Luis F Paige MD;  [facility];  Service: Cardiovascular;  Laterality: N/A;  50 x 5 cm    CARDIAC DEFIBRILLATOR PLACEMENT      IMPLANTATION OF RIGHT VENTRICULAR ASSIST DEVICE (RVAD) N/A 6/29/2022    Procedure: INSERTION, RVAD;  Surgeon: Luis F Paige MD;  [facility];  Service: Cardiovascular;  Laterality: N/A;    INSERTION OF GRAFT TO PERICARDIUM Right 6/30/2022    Procedure: INSERTION-RIGHT VENTRICULAR ASSIST DEVICE;  Surgeon: Luis F Paige MD;  [facility];  Service: Cardiovascular;  Laterality: Right;    IRRIGATION OF MEDIASTINUM  6/30/2022    Procedure: IRRIGATION, MEDIASTINUM;  Surgeon: Luis F Paige MD;  [facility];  Service: Cardiovascular;;    LEFT VENTRICULAR ASSIST DEVICE Left 6/23/2022    Procedure: INSERTION-LEFT VENTRICULAR ASSIST DEVICE;  Surgeon: Luis F Paige MD;  [facility];  Service: Cardiovascular;  Laterality: Left;    LEFT VENTRICULAR ASSIST DEVICE N/A 6/29/2022    Procedure: INSERTION-LEFT VENTRICULAR ASSIST DEVICE;  Surgeon: Luis F Paige MD;  [facility];  Service: Cardiovascular;  Laterality: N/A;    RIGHT HEART CATHETERIZATION Right 4/8/2022    Procedure:  INSERTION, CATHETER, RIGHT HEART;  Surgeon: Luca Lopez Jr., MD;  Location: [facility] CATH LAB;  Service: Cardiology;  Laterality: Right;    RIGHT HEART CATHETERIZATION Right 4/19/2022    Procedure: INSERTION, CATHETER, RIGHT HEART;  Surgeon: Josh Pulido MD;  Location: [facility] CATH LAB;  Service: Cardiology;  Laterality: Right;    STERNAL WOUND CLOSURE N/A 6/30/2022    Procedure: CLOSURE, WOUND, STERNUM;  Surgeon: Luis F Paige MD;  Location: [facility] OR [facility];  Service: Cardiovascular;  Laterality: N/A;       Review of patient's allergies indicates:   Allergen Reactions    Aspirin Other (See Comments)     Mr. Thacker is enrolled in Dr. Paige's Alon Trial and cannot have any aspirin and/or aspirin-containing products. DO NOT cancel any orders for INV Aspirin 100 mg/Placebo. If you have questions, please contact Isabel @ [phone], [phone],[email or web address], secure chat or MS Teams message.    Bumex [bumetanide] Hives    Lactose Diarrhea     Other reaction(s): Abdominal distension, gaseous    Torsemide Hives       Medications:  Medications Prior to Admission   Medication Sig    busPIRone (BUSPAR) 7.5 MG tablet Take 7.5 mg by mouth every 12 (twelve) hours.    EScitalopram oxalate (LEXAPRO) 5 MG Tab Take 1 tablet (5 mg total) by mouth once daily.    furosemide (LASIX) 80 MG tablet Take 80 mg by mouth 2 (two) times daily.    insulin aspart U-100 (NOVOLOG) 100 unit/mL (3 mL) InPn pen Inject 12 Units into the skin 3 (three) times daily.    insulin detemir U-100 (LEVEMIR FLEXTOUCH) 100 unit/mL (3 mL) SubQ InPn pen Inject 23 Units into the skin once daily.    mirtazapine (REMERON) 15 MG tablet Take 15 mg by mouth nightly.    pantoprazole (PROTONIX) 40 MG tablet Take 1 tablet (40 mg total) by mouth once daily.    potassium chloride SA (K-DUR,KLOR-CON) 20 MEQ tablet Take 2 tablets (40 mEq total) by mouth once daily.    sucralfate (CARAFATE) 1 gram tablet Take 1 g by mouth nightly.     "albuterol (PROVENTIL/VENTOLIN HFA) 90 mcg/actuation inhaler INHALE 2 PUFFS BY MOUTH EVERY 4 HOURS AS NEEDED FOR COUGH OR WHEEZING    blood sugar diagnostic Strp Use to test blood glucose 4 (four) times daily.    blood-glucose meter Misc Use as instructed    lancets 33 gauge Misc Use to test blood glucose 4 (four) times daily.    milrinone (PRIMACOR) 1 mg/mL injection Inject into the vein.    milrinone 20mg/100ml D5W, 200mcg/ml, (PRIMACOR) 20 mg/100 mL (200 mcg/mL) infusion Inject 34.875 mcg/min into the vein continuous.    pen needle, diabetic 31 gauge x 1/4" Ndle 1 Device by Misc.(Non-Drug; Combo Route) route 4 (four) times daily.    promethazine (PHENERGAN) 12.5 MG Tab Take 1 tablet (12.5 mg total) by mouth every 6 (six) hours as needed (nausea).    traMADoL (ULTRAM) 50 mg tablet Take 1 tablet (50 mg total) by mouth every 6 (six) hours as needed for Pain.     Antibiotics (From admission, onward)                Start     Stop Route Frequency Ordered    07/18/22 2300  cefepime in dextrose 5 % 1 gram/50 mL IVPB 1 g         -- IV Every 8 hours (non-standard times) 07/18/22 2152 07/18/22 2252  vancomycin - pharmacy to dose  (vancomycin IVPB)        "And" Linked Group Details    -- IV pharmacy to manage frequency 07/18/22 2152 06/21/22 0445  vancomycin 1.25 g in dextrose 5% 250 mL IVPB (ready to mix)  (vancomycin IVPB)         06/21 1644 IV Once pre-op 06/21/22 0436    06/21/22 0445  cefepime in dextrose 5 % IVPB 2 g         06/21 1644 IV Once pre-op 06/21/22 0436          Antifungals (From admission, onward)                None          Antivirals (From admission, onward)      None             Immunization History   Administered Date(s) Administered    COVID-19, MRNA, LN-S, PF (Pfizer) (Purple Cap) 12/08/2021, 12/29/2021       Family History       Problem Relation (Age of Onset)    Diverticulosis Brother    Heart attack Maternal Grandmother, Maternal Grandfather    Heart failure Father          Social " History     Socioeconomic History    Marital status: Legally    Tobacco Use    Smoking status: Former Smoker     Packs/day: 0.50     Years: 16.00     Pack years: 8.00     Start date: 10/1/2004     Quit date: 2021     Years since quittin.2    Smokeless tobacco: Never Used   Substance and Sexual Activity    Alcohol use: Not Currently    Drug use: Not Currently     Types: Marijuana, MDMA (Ecstacy)    Sexual activity: Yes     Partners: Female     Birth control/protection: None     Review of Systems   Constitutional:  Negative for activity change, appetite change, chills and fever.   HENT:  Negative for congestion, dental problem, ear pain and rhinorrhea.    Eyes:  Negative for pain and discharge.   Respiratory:  Negative for cough and shortness of breath.    Cardiovascular:  Negative for chest pain and leg swelling.   Gastrointestinal:  Negative for abdominal distention, abdominal pain, constipation, diarrhea, nausea and vomiting.   Genitourinary:  Negative for difficulty urinating and dysuria.   Musculoskeletal:  Negative for arthralgias, back pain and joint swelling.   Skin:  Negative for rash and wound.   Neurological:  Negative for dizziness, light-headedness and headaches.   Psychiatric/Behavioral:  Negative for behavioral problems and confusion.    Objective:     Vital Signs (Most Recent):  Temp: 97.6 °F (36.4 °C) (22 1639)  Pulse: 80 (22 1639)  Resp: 20 (22 163)  BP: (!) 84/0 (22 163)  SpO2: 99 % (22 163)   Vital Signs (24h Range):  Temp:  [97.2 °F (36.2 °C)-99.8 °F (37.7 °C)] 97.6 °F (36.4 °C)  Pulse:  [] 80  Resp:  [14-42] 20  SpO2:  [95 %-99 %] 99 %  BP: (80-90)/(0-72) 84/0     Weight: 86.6 kg (191 lb)  Body mass index is 23.87 kg/m².    Estimated Creatinine Clearance: 67.2 mL/min (A) (based on SCr of 1.8 mg/dL (H)).    Physical Exam  Constitutional:       General: He is not in acute distress.     Appearance: Normal appearance. He is  well-developed. He is not ill-appearing or diaphoretic.   HENT:      Head: Normocephalic and atraumatic.      Right Ear: External ear normal.      Left Ear: External ear normal.      Nose: Nose normal.   Eyes:      General: No scleral icterus.        Right eye: No discharge.         Left eye: No discharge.      Extraocular Movements: Extraocular movements intact.      Conjunctiva/sclera: Conjunctivae normal.   Pulmonary:      Effort: Pulmonary effort is normal. No respiratory distress.      Breath sounds: No stridor.      Comments: Subxypoid chest tube site clean and dry, no drainage  DLES dressing in place  Abdominal:      General: Abdomen is flat. There is no distension.      Palpations: Abdomen is soft.      Tenderness: There is no abdominal tenderness.   Musculoskeletal:         General: No swelling.   Skin:     General: Skin is dry.      Coloration: Skin is not jaundiced or pale.      Findings: No erythema.   Neurological:      General: No focal deficit present.      Mental Status: He is alert and oriented to person, place, and time. Mental status is at baseline.   Psychiatric:         Mood and Affect: Mood normal.         Behavior: Behavior normal.         Thought Content: Thought content normal.         Judgment: Judgment normal.       Significant Labs: CBC:   Recent Labs   Lab 07/18/22  1708 07/18/22  2021 07/19/22  0228   WBC 9.34 20.78* 20.07*   HGB 7.4* 7.3* 7.4*   HCT 24.5* 23.1* 23.7*   * 390 396     CMP:   Recent Labs   Lab 07/18/22  0311 07/18/22  1708 07/19/22  0228   * 130* 129*   K 3.1* 3.5  3.5 3.6  3.6   CL 96 96 96   CO2 26 24 22*   * 159* 209*   BUN 19 17 17   CREATININE 1.5* 1.5* 1.8*   CALCIUM 9.3 9.3 9.1   PROT 7.7  --  7.8   ALBUMIN 2.8*  --  2.9*   BILITOT 0.9  --  1.1*   ALKPHOS 156*  --  159*   AST 34  --  29   ALT 22  --  19   ANIONGAP 10 10 11   EGFRNONAA 58.6* 58.6* 47.0*     Microbiology Results (last 7 days)       Procedure Component Value Units Date/Time     Blood culture [[id number]] Collected: 07/19/22 0006    Order Status: Completed Specimen: Blood from Peripheral, Antecubital, Left Updated: 07/19/22 0915     Blood Culture, Routine No Growth to date            Significant Imaging: I have reviewed all pertinent imaging results/findings within the past 24 hours.

## 2022-07-19 NOTE — PROGRESS NOTES
Visited patient at bedside. Patient asleep and caregiver at bedside. Patient had a long night and caregiver explained that patient has been exhausted and not sleeping well. Did not wake patient at this time but talked with family about patient disconnecting driveline yesterday. Family denies any confusion, believes patient has just been physically exhausted and the tangled lines really bother him. Explained what happens when a driveline is disconnected like that, explained that passing out can be common to see. She verbalized understanding. Will continue to work on education. VAD numbers WNL at this time. Will continue to round on patient.

## 2022-07-19 NOTE — NURSING TRANSFER
Nursing Transfer Note      7/19/2022     Reason patient is being transferred: No longer requiring ICU care.    Transfer From: [address] SICU to [facility].    Transfer via bed    Transfer with cardiac monitoring    Transported by RN and PCT    Medicines sent: Vancomycin, Insulin, and Cefepime.    Any special needs or follow-up needed: Reattach to LVAD monitor.     Chart send with patient: Yes    Notified: spouse    Patient reassessed at: 10:25, 8/2/2022     Upon arrival to floor: cardiac monitor applied, patient oriented to room, call bell in reach and bed in lowest position.

## 2022-07-19 NOTE — SIGNIFICANT EVENT
Was notified by RN that the patient had seizure like activity witnessed by his spouse when sitting at the edge of his bed to urinate. His spouse states that she saw him drop the urine container and start to shake unresponsively for 30 seconds or so. Patient is now back to baseline without any post-ictal confusion. He states he remembers everything including the shaking but doesn't recall dropping the urinal. Denies biting his tongue. Denies family or personal history of epilepsy. Patient did not fall and did not hit his head he was just reportedly shaking in a slumped over position while sitting on edge of bed. No focal neuro deficits on exam and patient asymptomatic. No drops in flow or interruptions in LVAD function on monitor. Repeat mag and K pending (both were low and replaced IV this AM.)     Plan for non contrast CT scan and consult to neurology.

## 2022-07-19 NOTE — PROGRESS NOTES
[facility] - Cardiology Stepdown  Heart Transplant  Progress Note    Patient Name: Kevan Queen  MRN: [id number]  Admission Date: 6/13/2022  Hospital Length of Stay: 36 days  Attending Physician: Josh Pulido MD  Primary Care Provider: ORALIA Cline  Principal Problem:LVAD (left ventricular assist device) present    Subjective:     Interval History: Yesterday afternoon driveline was removed by patient in attempt to de tangle his lines. Regained consciousness quickly upon driveline being reinserted by RN. RN estimates the VAD was off for 30-45 seconds. Otherwise uneventful night. Patient was experiencing chills overnight. He is afebrile, but his WCC spiked to 20K. INR remains supratherapeutic at 4.4 despite holding coumadin.  initated yesterday for RV dysfunction. HM3 now running at decreased speed for 5100 rpm. Will repeat a limited echo this afternoon to look at BiV function as well as LV diameter. Cultures drawn overnight for WCC which are NGTD. Cefepime and Vanc running in meantime and ID consulted.     AM hemodynamics: CVP 11, CO 6.6, CI 3.1, .     Continuous Infusions:   sodium chloride 0.9% 5 mL/hr at 06/27/22 0055    sodium chloride 0.9% Stopped (07/18/22 2000)    DOBUTamine IV infusion (non-titrating) 2.5 mcg/kg/min (07/19/22 1000)    furosemide (LASIX) 2 mg/mL continuous infusion (non-titrating) 30 mg/hr (07/19/22 1000)     Scheduled Meds:   acetaminophen  650 mg Oral Q8H    busPIRone  7.5 mg Oral Daily    ceFEPime (MAXIPIME) IVPB  1 g Intravenous Q8H    famotidine  40 mg Oral Daily    ferrous gluconate  324 mg Oral Daily with breakfast    gabapentin  100 mg Oral TID    insulin aspart U-100  10 Units Subcutaneous TIDWM    insulin detemir U-100  22 Units Subcutaneous QHS    INV aspirin/placebo  100 mg Oral Daily    polyethylene glycol  17 g Oral Daily    potassium chloride  40 mEq Oral TID    sodium chloride 0.9%  10 mL Intravenous Q6H    tiZANidine  2 mg Oral Q8H     traZODone  50 mg Oral QHS    vancomycin (VANCOCIN) IVPB  1,250 mg Intravenous Q12H     PRN Meds:albuterol sulfate, bisacodyL, dextrose 10%, dextrose 10%, docusate sodium, glucagon (human recombinant), glucose, glucose, insulin aspart U-100, insulin aspart U-100, magnesium sulfate IVPB, ondansetron, oxyCODONE, polyethylene glycol, promethazine (PHENERGAN) IVPB, sodium chloride 0.9%, sodium chloride 0.9%, Flushing PICC Protocol **AND** sodium chloride 0.9% **AND** sodium chloride 0.9%, traMADoL, Pharmacy to dose Vancomycin consult **AND** vancomycin - pharmacy to dose    Review of patient's allergies indicates:   Allergen Reactions    Aspirin Other (See Comments)     Mr. Thacker is enrolled in Dr. Paige's Alon Trial and cannot have any aspirin and/or aspirin-containing products. DO NOT cancel any orders for INV Aspirin 100 mg/Placebo. If you have questions, please contact Isabel @ [phone], [phone],[email or web address], secure chat or MS Teams message.    Bumex [bumetanide] Hives    Lactose Diarrhea     Other reaction(s): Abdominal distension, gaseous    Torsemide Hives     Objective:     Vital Signs (Most Recent):  Temp: 97.8 °F (36.6 °C) (07/19/22 1025)  Pulse: 90 (07/19/22 1025)  Resp: 18 (07/19/22 1025)  BP: (!) 82/0 (07/19/22 1025)  SpO2: 95 % (07/19/22 1025)   Vital Signs (24h Range):  Temp:  [97.8 °F (36.6 °C)-99.8 °F (37.7 °C)] 97.8 °F (36.6 °C)  Pulse:  [] 90  Resp:  [11-48] 18  SpO2:  [95 %-99 %] 95 %  BP: (78-84)/(0) 82/0     Patient Vitals for the past 72 hrs (Last 3 readings):   Weight   07/18/22 1130 86.6 kg (191 lb)   07/18/22 0556 87 kg (191 lb 12.8 oz)   07/17/22 0426 88 kg (194 lb 0.1 oz)       Body mass index is 23.87 kg/m².      Intake/Output Summary (Last 24 hours) at 7/19/2022 1118  Last data filed at 7/19/2022 1000  Gross per 24 hour   Intake 1685.11 ml   Output 1575 ml   Net 110.11 ml       Hemodynamic Parameters: CVP 11       Telemetry: ST    Physical Exam  Vitals and  nursing note reviewed.   Constitutional:       Appearance: Normal appearance.   HENT:      Head: Normocephalic and atraumatic.   Eyes:      Extraocular Movements: Extraocular movements intact.      Conjunctiva/sclera: Conjunctivae normal.   Cardiovascular:      Rate and Rhythm: Regular rhythm. Tachycardia present.      Comments: Smooth VAD hum  Pulmonary:      Breath sounds: Normal breath sounds.   Abdominal:      Palpations: Abdomen is soft.   Musculoskeletal:         General: No swelling.      Cervical back: Neck supple.      Right lower leg: No edema.      Left lower leg: No edema.   Skin:     General: Skin is dry.      Capillary Refill: Capillary refill takes 2 to 3 seconds.   Neurological:      General: No focal deficit present.      Mental Status: He is alert and oriented to person, place, and time.      Motor: No weakness.   Psychiatric:         Mood and Affect: Mood normal.         Behavior: Behavior normal.         Thought Content: Thought content normal.         Judgment: Judgment normal.       Significant Labs:  CBC:  Recent Labs   Lab 07/18/22 1708 07/18/22 2021 07/19/22 0228   WBC 9.34 20.78* 20.07*   RBC 2.89* 2.90* 2.99*   HGB 7.4* 7.3* 7.4*   HCT 24.5* 23.1* 23.7*   * 390 396   MCV 85 80* 79*   MCH 25.6* 25.2* 24.7*   MCHC 30.2* 31.6* 31.2*       BNP:  Recent Labs   Lab 07/13/22  0312 07/14/22  0333 07/18/22  0311   * 458* 408*       CMP:  Recent Labs   Lab 07/17/22  0317 07/17/22  1551 07/18/22  0311 07/18/22  1708 07/19/22  0228   *   < > 246* 159* 209*   CALCIUM 9.4   < > 9.3 9.3 9.1   ALBUMIN 2.8*  --  2.8*  --  2.9*   PROT 7.5  --  7.7  --  7.8   *   < > 132* 130* 129*   K 3.4*   < > 3.1* 3.5  3.5 3.6  3.6   CO2 25   < > 26 24 22*   CL 94*   < > 96 96 96   BUN 18   < > 19 17 17   CREATININE 1.8*   < > 1.5* 1.5* 1.8*   ALKPHOS 157*  --  156*  --  159*   ALT 26  --  22  --  19   AST 35  --  34  --  29   BILITOT 1.1*  --  0.9  --  1.1*    < > = values in this  "interval not displayed.        Coagulation:   Recent Labs   Lab 07/17/22 0317 07/18/22 0311 07/19/22 0228   INR 3.1* 4.1* 4.4*   APTT 33.9* 35.4* 36.0*       LDH:  Recent Labs   Lab 07/17/22 0317 07/18/22 0311 07/19/22 0228   * 303* 319*       Microbiology:  Microbiology Results (last 7 days)       Procedure Component Value Units Date/Time    Blood culture [[id number]] Collected: 07/19/22 0006    Order Status: Completed Specimen: Blood from Peripheral, Antecubital, Left Updated: 07/19/22 0915     Blood Culture, Routine No Growth to date            I have reviewed all pertinent labs within the past 24 hours.    Estimated Creatinine Clearance: 67.2 mL/min (A) (based on SCr of 1.8 mg/dL (H)).    Diagnostic Results:  I have reviewed and interpreted all pertinent imaging results/findings within the past 24 hours.    Assessment and Plan:     38 yo male with NIDCM with BiV systolic heart failure, on home Milrinone at 0.375 mcg/kg/min, presented at [facility] 4/2/22 ,was not evaluated for OHT as he has recently quit smoking in April 2022 but was approved for VAD with plan to begin OHT evaluation in upcoming months if Mr Queen is stable and suitable for OHT eval (blood group A), 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 Went to ED at [facility] 6/10 but left after waiting 4 hours. Had clinic appointment with us today, but arrived to [facility] early this morning and decided to go to the ED instead. Baseline Lasix dose is 80 mg bid. Reports taking 240 mg qd past 3 days with no improvement. BNP is 1701, up from 898 on 6/2 and 49 on 5/24. sCr is 1.8 with baseline ~ 1.5-1.7. sPO2 on RA is 93%. Wife " at bedside    He has been given Lasix 80 mg IVP in the ED with plan to start Lasix gtt at 20 mg/hr           * LVAD (left ventricular assist device) present  - S/P DT HM3 with MVR plus Protek Duo for RV support 6/29 (Grecia)  - RVAD removed with concern for hemolysis.  - HTS primary   - GASTON trial   - INR supratherapeutic. Hold coumadin   - LDH stable  - CVP 11. Cr rising. Cont. IV Lasix gtt to 30 mg/hr.   - ECHO 7/12- AV does not open, IVS midline, moderate-severe reduced RVSF, LVEDD 5.02 with HM3 speed set to 5200 rpm (decreased from 5300 rpm the day prior)   - Repeat limited echo    Procedure: Device Interrogation Including analysis of device parameters  Current Settings: Ventricular Assist Device  Review of device function is stable    TXP LVAD INTERROGATIONS 7/19/2022 7/19/2022 7/19/2022 7/19/2022 7/19/2022 7/19/2022 7/19/2022   Type HeartMate3 HeartMate3 HeartMate3 HeartMate3 HeartMate3 HeartMate3 HeartMate3   Flow 4.3 4.4 4.5 4.5 4.4 4.4 4.5   Speed 5100 5200 5200 5200 5200 5200 5200   PI 3.3 3.3 3.3 3.2 3.3 3.4 3.3   Power (Serna) 3.6 3.8 3.7 3.7 3.7 3.7 3.8   LSL 4800 - - - - - -   Pulsatility Intermittent pulse Intermittent pulse Intermittent pulse Intermittent pulse Intermittent pulse Intermittent pulse Intermittent pulse       Tingling in extremities  - Pt reports paresthesias in right toes and fingers x 3 days   - Possible radiculopathy vs electrolyte abnormality vs neurovascular etiology   - Consulted General Neurology, suspect compression from surgical positioning/carpal tunnel syndrome. Recommended brace to L hand/wrist. If no improvement, will need outpatient EMG/NCS    Hyponatremia  -Hypervolemic, now improved  -Discontinued salt tablets   -Improvement in Na with diuresis    Staphylococcus epidermidis bacteremia  - Blood cultures 6/20 and repeat 6/21 positive for Staph Epi. One of two blood cultures from 6/23 positive for Staph (taken prior to line exchange). [facility]  - IJ  exchanged on 6/23, IABP exchanged 6/23  - ID recommended 14 day course of vancomycin from VAD implantation on 6/29 with end date: 7/12/22. Vancomycin discontinued per CTS with concerns for elevated sCr. ID with new recs to complete 7d course of daptomycin, which was completed 7/7/22    Uncontrolled diabetes mellitus  Admitted 5/24-5/27 with hyperglycemia hyperosmolar syndrome  - Hgb A1C 9.2 on 5/20/22  - Endocrine following, on insulin gtt    CKD (chronic kidney disease) stage 3, GFR 30-59 ml/min  SCr baseline ~ 1.5-1.7    Acute on chronic combined systolic and diastolic congestive heart failure, NYHA class 4  - NIDCM.  - Was not evaluated for OHTx as he quit smoking in April 2022.   - See LVAD    Uninterrupted Critical Care/Counseling Time (not including procedures): 45 min.       Jeff Spencer PA-C  Heart Transplant  [facility] - Cardiology Stepdown

## 2022-07-19 NOTE — NURSING
Pt;s wife stating pt had a seizure like activity while sitting on the edge of the bed to urinate.Pt's , doppler 84/0, HR 80 Isiah-99%the patient neurologically intact and AAOX4. DEYA Valle by the bedside.No new orders, stated neurology will be consulted.Robby,VAD coordinator notified.

## 2022-07-19 NOTE — PROGRESS NOTES
07/19/22 1639   Vital Signs   Temp 97.6 °F (36.4 °C)   Temp src Oral   Pulse 80   Resp 20   SpO2 99 %   Pulse Oximetry Type Intermittent   O2 Device (Oxygen Therapy) room air   BP (!) 84/0   BP Location Left arm   BP Method Doppler   Patient Position Lying        Cardiac/Telemetry Details / Alarms   Cardiac/Telemetry Monitor On Yes   Cardiac/Telemetry Audible Yes   Cardiac/Telemetry Alarms Set Yes   Assessments (Pre/Post)   Level of Consciousness (AVPU) alert    DEYA Perez notified of pt's lightheadedness and dizziness. Neuro assessment done. VS stable. No low flow alarms, VAD # within normal limits.

## 2022-07-19 NOTE — PROGRESS NOTES
"[facility] - Cardiology Stepdown  Endocrinology  Progress Note    Admit Date: 2022     Reason for Consult: Management of  type 2 DM, Hyperglycemia     Surgical Procedure and Date: none    Diabetes diagnosis year: 21    Home Diabetes Medications:  Detemir  23  units daily and Aspart   12  units TIDWM and  Mod dose correction insulin    Lab Results   Component Value Date    HGBA1C 9.2 (H) 2022           How often checking glucose at home? 1-3 x day   BG readings on regimen: 180- up to 300 once  Hypoglycemia on the regimen?  yes once- related to not really eating after taking aspart   Missed doses on regimen?  no    Diabetes Complications include:     Hyperglycemia    Complicating diabetes co morbidities:   History of MI, CHF, and CKD      HPI:   Patient is a 37 y.o. male with a diagnosis of type 2 DM and NIDCM with BiV systolic heart failure. Patient was presented at [facility] 22  and was  approved for VAD. Also recently admitted with [facility]; he had clinic appointment last week to f/u recent admit for hyperglycemic hyperosmolar syndrome but did not come as he was "feeling too bad" presents to  ED with SOB. Endocrinology consulted for BG/ DM management.                Interval HPI:   Overnight events: No acute events overnight. Patient stepped down to the CSU in room 316/316 A. Blood glucose stable. BG at and above goal on current insulin regimen (SSI, prandial, and basal insulin ). Steroid use- None. 19 Days Post-Op  Renal function- Abnormal - Creatinine 1.8   Vasopressors-  None       D[facility]; Consistent Carbohydrate; Fluid - 1500mL    Eatin%  Nausea: No  Hypoglycemia and intervention: No  Fever: No  TPN and/or TF: No      BP (!) 82/63 (BP Location: Left arm, Patient Position: Lying)   Pulse 86   Temp 97.4 °F (36.3 °C) (Oral)   Resp 18   Ht 6' 3" (1.905 m)   Wt 86.6 kg (191 lb)   SpO2 98%   BMI 23.87 kg/m²     Labs Reviewed and Include    Recent Labs   Lab " 07/19/22  0228   *   CALCIUM 9.1   ALBUMIN 2.9*   PROT 7.8   *   K 3.6  3.6   CO2 22*   CL 96   BUN 17   CREATININE 1.8*   ALKPHOS 159*   ALT 19   AST 29   BILITOT 1.1*     Lab Results   Component Value Date    WBC 20.07 (H) 07/19/2022    HGB 7.4 (L) 07/19/2022    HCT 23.7 (L) 07/19/2022    MCV 79 (L) 07/19/2022     07/19/2022     No results for input(s): TSH, FREET4 in the last 168 hours.  Lab Results   Component Value Date    HGBA1C 9.2 (H) 05/20/2022       Nutritional status:   Body mass index is 23.87 kg/m².  Lab Results   Component Value Date    ALBUMIN 2.9 (L) 07/19/2022    ALBUMIN 2.8 (L) 07/18/2022    ALBUMIN 2.8 (L) 07/17/2022     Lab Results   Component Value Date    PREALBUMIN 12 (L) 07/17/2022    PREALBUMIN 12 (L) 07/14/2022    PREALBUMIN 12 (L) 07/07/2022       Estimated Creatinine Clearance: 67.2 mL/min (A) (based on SCr of 1.8 mg/dL (H)).    Accu-Checks  Recent Labs     07/17/22  2152 07/18/22  0318 07/18/22  0737 07/18/22  0850 07/18/22  1344 07/18/22  1710 07/18/22  2021 07/18/22  2153 07/19/22  0242 07/19/22  0815   POCTGLUCOSE 113* 264* 175* 173* 184* 186* 212* 170* 280* 166*       Current Medications and/or Treatments Impacting Glycemic Control  Immunotherapy:    Immunosuppressants       None          Steroids:   Hormones (From admission, onward)                None          Pressors:    Autonomic Drugs (From admission, onward)                None          Hyperglycemia/Diabetes Medications:   Antihyperglycemics (From admission, onward)                Start     Stop Route Frequency Ordered    07/18/22 1503  insulin aspart U-100 pen 4 Units         -- SubQ As needed (PRN) 07/18/22 1404    07/16/22 2100  insulin detemir U-100 pen 22 Units         -- SubQ Nightly 07/16/22 0636    07/13/22 1130  insulin aspart U-100 pen 10 Units         -- SubQ 3 times daily with meals 07/13/22 1106    07/10/22 1718  insulin aspart U-100 pen 1-10 Units         -- SubQ Before meals & nightly PRN  07/10/22 1618            ASSESSMENT and PLAN    * LVAD (left ventricular assist device) present  Managed per primary team  Avoid hypoglycemia        Uncontrolled diabetes mellitus  Endocrinology consulted for BG management.   BG goal 140-180    - Continue levemir 22 units HS.   - Continue Novolog 10 units TIDWM and prn for BG excursions [facility] (150/25) SSI.  - BG monitoring ac/hs and moderate dose correction scale.   - Hypoglycemia protocol in place.     ** Please notify Endocrine for any change and/or advance in diet**  ** Please call Endocrine for any BG related issues **    Discharge Planning:   TBD. Please notify endocrinology prior to discharge.        Acute on chronic combined systolic and diastolic congestive heart failure, NYHA class 4  Optimize glucose control and  avoid hypoglycemia    Managed per primary.       CKD (chronic kidney disease) stage 3, GFR 30-59 ml/min    Titrate insulin slowly to avoid hypoglycemia as the risk of hypoglycemia increases with decreased creatinine clearance.    Estimated Creatinine Clearance: 67.2 mL/min (A) (based on SCr of 1.8 mg/dL (H)).      Surgical wound present  Optimize BG for surgical wound healing.              Michael Wyman, DNP, FNP  Endocrinology  [facility] - Cardiology Stepdown

## 2022-07-19 NOTE — PROGRESS NOTES
Cardiac device interrogation and/or reprogramming completed by industry representative, Awais COBB with [employer] on 7/18/22; please refer to report located in the Media tab.

## 2022-07-19 NOTE — ASSESSMENT & PLAN NOTE
37M s/p LVAD on 6/29, inadvertantly unplugged LVAD yesterday after he got tangled in lines, then developed chills. No documented fever. Started on empiric vanc and cefepime. Blood cx pending. No drainage from DLES, surgical site healing welll    Recommendations:  - requested RN take picture of DLES during next dressing change for review  - continue current vanc and cefepime  - if ongoing symptoms, will evaluate further, however pt states he is feeling well today    Will follow

## 2022-07-19 NOTE — PROGRESS NOTES
07/19/2022  Enriquetyrone Aawn Jr    Current provider:  Josh Pulido MD    Device interrogation:  TXP LVAD INTERROGATIONS 7/19/2022 7/19/2022 7/19/2022 7/19/2022 7/19/2022 7/19/2022 7/19/2022   Type HeartMate3 HeartMate3 HeartMate3 HeartMate3 HeartMate3 HeartMate3 HeartMate3   Flow 4.3 4.4 4.5 4.5 4.4 4.4 4.5   Speed 5100 5200 5200 5200 5200 5200 5200   PI 3.3 3.3 3.3 3.2 3.3 3.4 3.3   Power (Serna) 3.6 3.8 3.7 3.7 3.7 3.7 3.8   LSL 4800 - - - - - -   Pulsatility Intermittent pulse Intermittent pulse Intermittent pulse Intermittent pulse Intermittent pulse Intermittent pulse Intermittent pulse          Rounded on Kevan Queen to ensure all mechanical assist device settings (IABP or VAD) were appropriate and all parameters were within limits.  I was able to ensure all back up equipment was present, the staff had no issues, and the Perfusion Department daily rounding was complete.      For implantable VADs: Interrogation of Ventricular assist device was performed with analysis of device parameters and review of device function. I have personally reviewed the interrogation findings and agree with findings as stated.     In emergency, the nursing units have been notified to contact the perfusion department either by:  Calling [phone] from 630am to 4pm Mon thru Fri, utilizing the On-Call Finder functionality of Epic and searching for Perfusion, or by contacting the hospital  from 4pm to 630am and on weekends and asking to speak with the perfusionist on call.    10:49 AM

## 2022-07-19 NOTE — NURSING
"1430 Pt removed driveline from LVAD remote in an attempt to "detangle" his lines. Pt became unresponsive with mild seizure-like activity. Multiple RNs at the bedside, MDs at bedside, and eICU called. Code cart at bedside. Driveline reinserted and locked into remote. Pt gasped for air and became responsive again. Pt AAOx4 to all orientation questions. Pt educated on the importance of never removing the driveline from the remote and verbalized understanding.     1530 Pt began experiencing episodes of nausea. Zofran given.     1830 Pt objectively shivering and complaining of being freezing. Temp 97.6. Bare hugger applied. Dr. Loera aware of pt status.     Today's Events: Bedside echo completed. Dobutamine gtt started.   "

## 2022-07-19 NOTE — PLAN OF CARE
AAOX4,VSS,O2 sats>97% on RA.Patient ambulating with assistx1,fall precautions in place.LVAD DP and numbers WNL, smooth LVAD hum. Patient has no complaints of pain/SOB. Pt getting diuresed with lasix gtts,diuresing well. gtts infusing at 2.5 mcgs/kgs/min.Discussed medications and care.Patient has no questions at this time.Pt visualised and stable.Pt resting well,call light within reach, bed at the lowest position.

## 2022-07-19 NOTE — PLAN OF CARE
"      SICU PLAN OF CARE NOTE    Dx: LVAD (left ventricular assist device), prior RVAD     Shift Events: Pt's V/S stable over shift. Pt reported intermittent shivering/feeling cold over night, Tmax of 99.8 over shift, pt reports no feelings of being cold this AM off of bairhugger warmer. Blood culture x1 obtained, pt started on broad spectrum IV abx over night. Pt reports intermittent nausea with no vomitting, PRN zofran / phenergan given x1 dose each, worked well to control nausea. Pt able to rest intermittently over shift, scheduled pain and sleep meds given, working well to control pain. Pt has adequate UOP over night. Plan to wean diuretic as tolerated, further independence with PT/OT as tolerated, stepdown out of ICU today. POC reviewed with pt and pt's significant other, all questions answered and encouraged.     Goals of Care: MAP 65-90      Neuro: AAO x4, Follows Commands and Moves All Extremities     Vital Signs: BP (!) 84/0 (BP Location: Left arm, Patient Position: Lying)   Pulse 103   Temp 99.8 °F (37.7 °C) (Oral)   Resp (!) 29   Ht 6' 3" (1.905 m)   Wt 86.6 kg (191 lb)   SpO2 99%   BMI 23.87 kg/m²             Vital Signs: BP (!) 74/0 (BP Location: Left arm, Patient Position: Lying)   Pulse 87   Temp 98.5 °F (36.9 °C) (Oral)   Resp 15   Ht 6' 3" (1.905 m)   Wt 88 kg (194 lb 0.1 oz)   SpO2 100%   BMI 24.25 kg/m²      Cardiac: NSR - ST, HR 90-100s, CVP 12/12, SvO2 47/53     Respiratory: Room Air     Diet: Cardiac Diet and Diabetic Diet, 1500 FR     Gtts: Lasix     Urine Output: Voids Spontaneously 2675 cc/shift      VAD: HM3, Speed 5200, Flows 4.4-4.8, no alarms over shift     Labs: daily / Q12H K/Mg, 60 mEq of K given IVPB, 2g of Mg to be given IVPB / Accuchecks: ACHS     Skin: No skin breakdown noted over shift. Pt able to reposition independently, pillow support provided. Immerse bed plugged in, inflated, and working properly.       "

## 2022-07-19 NOTE — PT/OT/SLP PROGRESS
Physical Therapy Treatment    Patient Name:  Kevan Queen   MRN:  [id number]    Recommendations:     Discharge Recommendations:  home health PT   Discharge Equipment Recommendations:  (TBD by next level of care)   Barriers to discharge: None    Assessment:     Kevan Queen is a 37 y.o. male admitted with a medical diagnosis of LVAD (left ventricular assist device) present.  He presents with the following impairments/functional limitations:  weakness, impaired endurance, impaired self care skills, impaired functional mobility, gait instability, impaired balance, decreased upper extremity function, decreased lower extremity function, orthopedic precautions, impaired cardiopulmonary response to activity, decreased safety awareness, decreased coordination. Pt tolerated session well. Pt reported dizziness with initial stand that was resolved with seated rest break and ankle pumps. Pt performed second stand with some minor dizziness but was able to perform marches in standing that resolved dizziness. Pt ambulated 16 ft and then 35 ft in the room with some minor unsteadiness and CGA.    Rehab Prognosis: Good; patient would benefit from acute skilled PT services to address these deficits and reach maximum level of function.    Recent Surgery: Procedure(s) (LRB):  CLOSURE, WOUND, STERNUM (N/A)  INSERTION-RIGHT VENTRICULAR ASSIST DEVICE (Right)  APPLICATION, WOUND VAC (N/A)  IRRIGATION, MEDIASTINUM 19 Days Post-Op    Plan:     During this hospitalization, patient to be seen 3 x/week to address the identified rehab impairments via gait training, therapeutic activities, therapeutic exercises, neuromuscular re-education and progress toward the following goals:    · Plan of Care Expires:  07/01/22    Subjective     Chief Complaint: pt reported being sleepy  Patient/Family Comments/goals: return home  Pain/Comfort:  Pain Rating 1: 0/10  Pain Rating Post-Intervention 1: 0/10      Objective:     Communicated with RN prior to  session.  Patient found supine with LVAD, telemetry upon PT entry to room.     General Precautions: Standard, fall, sternal   Orthopedic Precautions:N/A   Braces: N/A  Respiratory Status: Room air     Functional Mobility:  · Bed Mobility:     · Scooting: stand by assistance  · Supine to Sit: stand by assistance  · Transfers:     · Sit to Stand, Trial 1:  contact guard assistance with no AD, reported dizziness and seated rest break and ankle pumps  · Sit to Stand, Trial 2:  contact guard assistance with no AD, reported dizziness resolved with marching in place  · Sit to Stand, Trial 3:  stand by assistance with no AD  · Gait: 16 ft and 35 ft, SBA-CGA, no AD; decreased gait speed, mild unsteadiness present, decreased step length; no major LOBs  · Balance:   · Static Sitting: Supervision  · Dynamic Sitting: N/T  · Static Standing: SBA  · Dynamic Standing: SBA-CGA      AM-PAC 6 CLICK MOBILITY  Turning over in bed (including adjusting bedclothes, sheets and blankets)?: 3  Sitting down on and standing up from a chair with arms (e.g., wheelchair, bedside commode, etc.): 3  Moving from lying on back to sitting on the side of the bed?: 3  Moving to and from a bed to a chair (including a wheelchair)?: 3  Need to walk in hospital room?: 3  Climbing 3-5 steps with a railing?: 3  Basic Mobility Total Score: 18       Therapeutic Activities and Exercises:  Patient educated on role of therapy, goals of session, and benefits of mobilizing.   Discussed PT plan of care during hospitalization.   Patient educated on calling for assistance.   Patient educated on how their diagnosis impacts their mobility within PT scope of practice.   Communication board up to date.  All questions answered within PT scope of practice.    Patient left sitting edge of bed with all lines intact, call button in reach, RN notified and pt's family member present.    GOALS:   Multidisciplinary Problems     Physical Therapy Goals        Problem: Physical Therapy     Goal Priority Disciplines Outcome Goal Variances Interventions   Physical Therapy Goal     PT, PT/OT Ongoing, Progressing     Description: Goals to be met by: 2022    Patient will increase functional independence with mobility by performin. Supine to sit with MInimal Assistance -met   Updated: supervision  2. Sit to stand transfer with Minimal Assistance - met   2a. Sit to stand transfer with supervision- met   2b. Sit to stand with independence   3. Gait  x 50 feet with Contact Guard Assistance - met    3a. Gait 400 ft with Supervision                  Multidisciplinary Problems (Resolved)        Problem: Physical Therapy    Goal Priority Disciplines Outcome Goal Variances Interventions   Physical Therapy Goal   (Resolved)     PT, PT/OT Met     Description:     Problem: Physical Therapy  Goal: Physical Therapy Goal  Description: Goals to be met by: 2022     Patient will increase functional independence with mobility by performin. Lower extremity exercise program without IABP 30 reps per handout, with independence  2.Upper extremity exercise program 30 reps per handout, with independence                         Time Tracking:     PT Received On: 22  PT Start Time: 1145     PT Stop Time: 1208  PT Total Time (min): 23 min     Billable Minutes: Gait Training 13 and Therapeutic Exercise 10    Treatment Type: Treatment  PT/PTA: PT     PTA Visit Number: 0     2022

## 2022-07-19 NOTE — PROGRESS NOTES
Pharmacokinetic Initial Assessment: IV Vancomycin    Assessment/Plan:    Initiate intravenous vancomycin with loading dose of 2250 mg once followed by a maintenance dose of vancomycin 1250mg IV every 12 hours  Desired empiric serum trough concentration is 15 to 20 mcg/mL  Draw vancomycin trough level 60 min prior to fourth dose on 7/20/22 at approximately 1000.  Pharmacy will continue to follow and monitor vancomycin.      Please contact pharmacy at extension [phone] with any questions regarding this assessment.     Thank you for the consult,   Jameson Esteves       Patient brief summary:  Kevan Queen is a 37 y.o. male initiated on antimicrobial therapy with IV Vancomycin for treatment of suspected bacteremia    Drug Allergies:   Review of patient's allergies indicates:   Allergen Reactions    Aspirin Other (See Comments)     Mr. Thacker is enrolled in Dr. Paige's Alon Trial and cannot have any aspirin and/or aspirin-containing products. DO NOT cancel any orders for INV Aspirin 100 mg/Placebo. If you have questions, please contact Isabel @ [phone], [phone],[email or web address], secure chat or MS Teams message.    Bumex [bumetanide] Hives    Lactose Diarrhea     Other reaction(s): Abdominal distension, gaseous    Torsemide Hives       Actual Body Weight:   86.6 kg    Renal Function:   Estimated Creatinine Clearance: 80.6 mL/min (A) (based on SCr of 1.5 mg/dL (H)).,     Dialysis Method (if applicable):  N/A    CBC (last 72 hours):  Recent Labs   Lab Result Units 07/16/22  0250 07/17/22  0317 07/18/22  0311 07/18/22  1708 07/18/22  2021   WBC K/uL 10.57 8.70 8.22 9.34 20.78*   Hemoglobin g/dL 6.9* 7.2* 7.5* 7.4* 7.3*   Hematocrit % 22.8* 24.0* 24.5* 24.5* 23.1*   Platelets K/uL 434 433 461* 455* 390   Gran % % 77.7* 73.6* 72.7 80.6* 94.9*   Lymph % % 12.9* 17.1* 18.4 11.6* 2.8*   Mono % % 7.4 7.1 6.9 6.3 1.7*   Eosinophil % % 1.2 1.6 1.3 0.9 0.1   Basophil % % 0.4 0.3 0.5 0.3 0.1   Differential Method   Automated Automated Automated Automated Automated       Metabolic Panel (last 72 hours):  Recent Labs   Lab Result Units 07/16/22  0250 07/16/22  1612 07/16/22  2204 07/17/22  0317 07/17/22  1551 07/18/22  0115 07/18/22  0311 07/18/22  1708   Sodium mmol/L 128*  --   --  130*  --  134* 132* 130*   Potassium mmol/L 3.9 3.3* 4.1 3.4* 4.1 3.1* 3.1* 3.5  3.5   Chloride mmol/L 91*  --   --  94*  --  98 96 96   CO2 mmol/L 27  --   --  25  --  26 26 24   Glucose mg/dL 265*  --   --  189*  --  190* 246* 159*   BUN mg/dL 15  --   --  18  --  20 19 17   Creatinine mg/dL 1.6*  --   --  1.8*  --  1.5* 1.5* 1.5*   Albumin g/dL 2.7*  --   --  2.8*  --   --  2.8*  --    Total Bilirubin mg/dL 0.9  --   --  1.1*  --   --  0.9  --    Alkaline Phosphatase U/L 156*  --   --  157*  --   --  156*  --    AST U/L 39  --   --  35  --   --  34  --    ALT U/L 30  --   --  26  --   --  22  --    Magnesium mg/dL 1.8 2.1 2.3 2.0 2.0 1.9  --  2.2   Phosphorus mg/dL  --   --   --  4.1  --  3.8  --   --        Drug levels (last 3 results):  No results for input(s): VANCOMYCINRA, VANCORANDOM, VANCOMYCINPE, VANCOPEAK, VANCOMYCINTR, VANCOTROUGH in the last 72 hours.    Microbiologic Results:  Microbiology Results (last 7 days)     Procedure Component Value Units Date/Time    Blood culture [[id number]]     Order Status: Sent Specimen: Blood

## 2022-07-19 NOTE — SUBJECTIVE & OBJECTIVE
"Interval HPI:   Overnight events: No acute events overnight. Patient stepped down to the CSU in room 316/316 A. Blood glucose stable. BG at and above goal on current insulin regimen (SSI, prandial, and basal insulin ). Steroid use- None. 19 Days Post-Op  Renal function- Abnormal - Creatinine 1.8   Vasopressors-  None       Diet [facility]; Consistent Carbohydrate; Fluid - 1500mL    Eatin%  Nausea: No  Hypoglycemia and intervention: No  Fever: No  TPN and/or TF: No      BP (!) 82/63 (BP Location: Left arm, Patient Position: Lying)   Pulse 86   Temp 97.4 °F (36.3 °C) (Oral)   Resp 18   Ht 6' 3" (1.905 m)   Wt 86.6 kg (191 lb)   SpO2 98%   BMI 23.87 kg/m²     Labs Reviewed and Include    Recent Labs   Lab 22   *   CALCIUM 9.1   ALBUMIN 2.9*   PROT 7.8   *   K 3.6  3.6   CO2 22*   CL 96   BUN 17   CREATININE 1.8*   ALKPHOS 159*   ALT 19   AST 29   BILITOT 1.1*     Lab Results   Component Value Date    WBC 20.07 (H) 2022    HGB 7.4 (L) 2022    HCT 23.7 (L) 2022    MCV 79 (L) 2022     2022     No results for input(s): TSH, FREET4 in the last 168 hours.  Lab Results   Component Value Date    HGBA1C 9.2 (H) 2022       Nutritional status:   Body mass index is 23.87 kg/m².  Lab Results   Component Value Date    ALBUMIN 2.9 (L) 2022    ALBUMIN 2.8 (L) 2022    ALBUMIN 2.8 (L) 2022     Lab Results   Component Value Date    PREALBUMIN 12 (L) 2022    PREALBUMIN 12 (L) 2022    PREALBUMIN 12 (L) 2022       Estimated Creatinine Clearance: 67.2 mL/min (A) (based on SCr of 1.8 mg/dL (H)).    Accu-Checks  Recent Labs     222 22  0318 22  0737 22  0850 22  1344 22  1710 22  2021 22  2153 22  0242 22  0815   POCTGLUCOSE 113* 264* 175* 173* 184* 186* 212* 170* 280* 166*       Current Medications and/or Treatments Impacting Glycemic " Control  Immunotherapy:    Immunosuppressants       None          Steroids:   Hormones (From admission, onward)                None          Pressors:    Autonomic Drugs (From admission, onward)                None          Hyperglycemia/Diabetes Medications:   Antihyperglycemics (From admission, onward)                Start     Stop Route Frequency Ordered    07/18/22 1503  insulin aspart U-100 pen 4 Units         -- SubQ As needed (PRN) 07/18/22 1404    07/16/22 2100  insulin detemir U-100 pen 22 Units         -- SubQ Nightly 07/16/22 0636    07/13/22 1130  insulin aspart U-100 pen 10 Units         -- SubQ 3 times daily with meals 07/13/22 1106    07/10/22 1718  insulin aspart U-100 pen 1-10 Units         -- SubQ Before meals & nightly PRN 07/10/22 1618

## 2022-07-19 NOTE — CARE UPDATE
"Called by nursing that pt is shivering in bed and shaking    Eval pt at the bedside.  He is under a bear hugger and is complaining of "being freezing".  He is shaking.  VS stable.  Tachycardic    I obtained a CBC which shows an elevated WBC.  Obtain blood cultures.  Procal is negative.  Unable to get POC lactic acid.  Sent official lactic acid to the lab    PLAN:  - unclear why his WBC jumped so much (no steroids) however he unplugged his LVAD and unclear if replaced under sterile conditions given severity of situation  - will empirically start vanc/cefepime after bl cx  - consult ID    Yohannes Loera, PGY5  Cardiovascular Disease  [facility]    "

## 2022-07-19 NOTE — ASSESSMENT & PLAN NOTE
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  - HTS primary   - GASTON trial   - INR supratherapeutic. Hold coumadin   - LDH stable  - CVP 11. Cr rising. Cont. IV Lasix gtt to 30 mg/hr.   - ECHO 7/12- AV does not open, IVS midline, moderate-severe reduced RVSF, LVEDD 5.02 with HM3 speed set to 5200 rpm (decreased from 5300 rpm the day prior)   - Repeat limited echo    Procedure: Device Interrogation Including analysis of device parameters  Current Settings: Ventricular Assist Device  Review of device function is stable    TXP LVAD INTERROGATIONS 7/19/2022 7/19/2022 7/19/2022 7/19/2022 7/19/2022 7/19/2022 7/19/2022   Type HeartMate3 HeartMate3 HeartMate3 HeartMate3 HeartMate3 HeartMate3 HeartMate3   Flow 4.3 4.4 4.5 4.5 4.4 4.4 4.5   Speed 5100 5200 5200 5200 5200 5200 5200   PI 3.3 3.3 3.3 3.2 3.3 3.4 3.3   Power (Serna) 3.6 3.8 3.7 3.7 3.7 3.7 3.8   LSL 4800 - - - - - -   Pulsatility Intermittent pulse Intermittent pulse Intermittent pulse Intermittent pulse Intermittent pulse Intermittent pulse Intermittent pulse

## 2022-07-19 NOTE — ASSESSMENT & PLAN NOTE
Endocrinology consulted for BG management.   BG goal 140-180    - Continue levemir 22 units HS.   - Continue Novolog 10 units TIDWM and prn for BG excursions MDC (150/25) SSI.  - BG monitoring ac/hs and moderate dose correction scale.   - Hypoglycemia protocol in place.     ** Please notify Endocrine for any change and/or advance in diet**  ** Please call Endocrine for any BG related issues **    Discharge Planning:   TBD. Please notify endocrinology prior to discharge.

## 2022-07-19 NOTE — PROGRESS NOTES
Pharmacokinetic Assessment Follow Up: IV Vancomycin    Plan to hold this evenings scheduled vancomycin dose for increased SCr from 1.5 to 1.8.   Follow up AM random levels.   Redose for random level <20     Thank you for the consult,   Yany Perkins, PharmD, [facility]  Heart Transplant Clinical Specialist   Spectralink: [id number]    Drug levels (last 3 results):  No results for input(s): VANCOMYCINRA, VANCOMYCINPE, VANCOMYCINTR, VANCOTROUGH in the last 72 hours.     Patient brief summary:  Kevan Queen is a 37 y.o. male initiated on antimicrobial therapy with IV Vancomycin for treatment of sepsis    Drug Allergies:   Review of patient's allergies indicates:   Allergen Reactions    Aspirin Other (See Comments)     Mr. Thacker is enrolled in Dr. Paige's Alon Trial and cannot have any aspirin and/or aspirin-containing products. DO NOT cancel any orders for INV Aspirin 100 mg/Placebo. If you have questions, please contact Isabel @ [phone], [phone],[email or web address], secure chat or MS Teams message.    Bumex [bumetanide] Hives    Lactose Diarrhea     Other reaction(s): Abdominal distension, gaseous    Torsemide Hives       Actual Body Weight:   86.6kg    Renal Function:   Estimated Creatinine Clearance: 67.2 mL/min (A) (based on SCr of 1.8 mg/dL (H)).,     Dialysis Method (if applicable):  N/A    CBC (last 72 hours):  Recent Labs   Lab Result Units 07/17/22  0317 07/18/22  0311 07/18/22  1708 07/18/22  2021 07/19/22  0228   WBC K/uL 8.70 8.22 9.34 20.78* 20.07*   Hemoglobin g/dL 7.2* 7.5* 7.4* 7.3* 7.4*   Hematocrit % 24.0* 24.5* 24.5* 23.1* 23.7*   Platelets K/uL 433 461* 455* 390 396   Gran % % 73.6* 72.7 80.6* 94.9* 90.5*   Lymph % % 17.1* 18.4 11.6* 2.8* 3.6*   Mono % % 7.1 6.9 6.3 1.7* 5.2   Eosinophil % % 1.6 1.3 0.9 0.1 0.0   Basophil % % 0.3 0.5 0.3 0.1 0.2   Differential Method  Automated Automated Automated Automated Automated       Metabolic Panel (last 72 hours):  Recent Labs   Lab Result Units  07/16/22  1612 07/16/22  2204 07/17/22  0317 07/17/22  1551 07/18/22  0115 07/18/22  0311 07/18/22  1708 07/19/22  0228   Sodium mmol/L  --   --  130*  --  134* 132* 130* 129*   Potassium mmol/L 3.3* 4.1 3.4* 4.1 3.1* 3.1* 3.5  3.5 3.6  3.6   Chloride mmol/L  --   --  94*  --  98 96 96 96   CO2 mmol/L  --   --  25  --  26 26 24 22*   Glucose mg/dL  --   --  189*  --  190* 246* 159* 209*   BUN mg/dL  --   --  18  --  20 19 17 17   Creatinine mg/dL  --   --  1.8*  --  1.5* 1.5* 1.5* 1.8*   Albumin g/dL  --   --  2.8*  --   --  2.8*  --  2.9*   Total Bilirubin mg/dL  --   --  1.1*  --   --  0.9  --  1.1*   Alkaline Phosphatase U/L  --   --  157*  --   --  156*  --  159*   AST U/L  --   --  35  --   --  34  --  29   ALT U/L  --   --  26  --   --  22  --  19   Magnesium mg/dL 2.1 2.3 2.0 2.0 1.9  --  2.2 1.5*   Phosphorus mg/dL  --   --  4.1  --  3.8  --   --   --        Vancomycin Administrations:  vancomycin given in the last 96 hours                   vancomycin 1.25 g in dextrose 5% 250 mL IVPB (ready to mix) (mg) 1,250 mg New Bag 07/19/22 1252    vancomycin (VANCOCIN) 2,250 mg in dextrose 5 % 500 mL IVPB (mg) 2,250 mg New Bag 07/19/22 0053                Microbiologic Results:  Microbiology Results (last 7 days)     Procedure Component Value Units Date/Time    Blood culture [[id number]] Collected: 07/19/22 0006    Order Status: Completed Specimen: Blood from Peripheral, Antecubital, Left Updated: 07/19/22 0915     Blood Culture, Routine No Growth to date

## 2022-07-20 ENCOUNTER — DOCUMENTATION ONLY (OUTPATIENT)
Dept: ELECTROPHYSIOLOGY | Facility: CLINIC | Age: 37
End: 2022-07-20
Payer: MEDICAID

## 2022-07-20 PROBLEM — R25.1 SHAKING: Status: ACTIVE | Noted: 2022-07-20

## 2022-07-20 PROBLEM — R56.9 SEIZURE-LIKE ACTIVITY: Status: ACTIVE | Noted: 2022-07-20

## 2022-07-20 PROBLEM — D72.829 LEUKOCYTOSIS: Status: ACTIVE | Noted: 2022-07-20

## 2022-07-20 LAB
ALBUMIN SERPL BCP-MCNC: 2.9 G/DL (ref 3.5–5.2)
ALLENS TEST: ABNORMAL
ALP SERPL-CCNC: 145 U/L (ref 55–135)
ALT SERPL W/O P-5'-P-CCNC: 17 U/L (ref 10–44)
ANION GAP SERPL CALC-SCNC: 10 MMOL/L (ref 8–16)
ANION GAP SERPL CALC-SCNC: 12 MMOL/L (ref 8–16)
APTT BLDCRRT: 34.5 SEC (ref 21–32)
AST SERPL-CCNC: 28 U/L (ref 10–40)
BASOPHILS # BLD AUTO: 0.02 K/UL (ref 0–0.2)
BASOPHILS NFR BLD: 0.2 % (ref 0–1.9)
BILIRUB SERPL-MCNC: 0.9 MG/DL (ref 0.1–1)
BNP SERPL-MCNC: 496 PG/ML (ref 0–99)
BUN SERPL-MCNC: 15 MG/DL (ref 6–20)
BUN SERPL-MCNC: 15 MG/DL (ref 6–20)
CALCIUM SERPL-MCNC: 9.3 MG/DL (ref 8.7–10.5)
CALCIUM SERPL-MCNC: 9.6 MG/DL (ref 8.7–10.5)
CHLORIDE SERPL-SCNC: 96 MMOL/L (ref 95–110)
CHLORIDE SERPL-SCNC: 99 MMOL/L (ref 95–110)
CO2 SERPL-SCNC: 27 MMOL/L (ref 23–29)
CO2 SERPL-SCNC: 27 MMOL/L (ref 23–29)
CREAT SERPL-MCNC: 1.3 MG/DL (ref 0.5–1.4)
CREAT SERPL-MCNC: 1.4 MG/DL (ref 0.5–1.4)
CRP SERPL-MCNC: 86.9 MG/L (ref 0–8.2)
DELSYS: ABNORMAL
DIFFERENTIAL METHOD: ABNORMAL
EOSINOPHIL # BLD AUTO: 0.1 K/UL (ref 0–0.5)
EOSINOPHIL NFR BLD: 0.9 % (ref 0–8)
ERYTHROCYTE [DISTWIDTH] IN BLOOD BY AUTOMATED COUNT: 18.5 % (ref 11.5–14.5)
EST. GFR  (AFRICAN AMERICAN): >60 ML/MIN/1.73 M^2
EST. GFR  (AFRICAN AMERICAN): >60 ML/MIN/1.73 M^2
EST. GFR  (NON AFRICAN AMERICAN): >60 ML/MIN/1.73 M^2
EST. GFR  (NON AFRICAN AMERICAN): >60 ML/MIN/1.73 M^2
FIBRINOGEN PPP-MCNC: 523 MG/DL (ref 182–400)
GLUCOSE SERPL-MCNC: 115 MG/DL (ref 70–110)
GLUCOSE SERPL-MCNC: 182 MG/DL (ref 70–110)
HCO3 UR-SCNC: 29 MMOL/L (ref 24–28)
HCT VFR BLD AUTO: 25.9 % (ref 40–54)
HGB BLD-MCNC: 7.8 G/DL (ref 14–18)
IMM GRANULOCYTES # BLD AUTO: 0.02 K/UL (ref 0–0.04)
IMM GRANULOCYTES NFR BLD AUTO: 0.2 % (ref 0–0.5)
INR PPP: 2.6 (ref 0.8–1.2)
LDH SERPL L TO P-CCNC: 290 U/L (ref 110–260)
LYMPHOCYTES # BLD AUTO: 1.1 K/UL (ref 1–4.8)
LYMPHOCYTES NFR BLD: 8.5 % (ref 18–48)
MAGNESIUM SERPL-MCNC: 1.8 MG/DL (ref 1.6–2.6)
MAGNESIUM SERPL-MCNC: 2.1 MG/DL (ref 1.6–2.6)
MCH RBC QN AUTO: 25.2 PG (ref 27–31)
MCHC RBC AUTO-ENTMCNC: 30.1 G/DL (ref 32–36)
MCV RBC AUTO: 84 FL (ref 82–98)
MONOCYTES # BLD AUTO: 1.1 K/UL (ref 0.3–1)
MONOCYTES NFR BLD: 8.9 % (ref 4–15)
NEUTROPHILS # BLD AUTO: 10.1 K/UL (ref 1.8–7.7)
NEUTROPHILS NFR BLD: 81.3 % (ref 38–73)
NRBC BLD-RTO: 0 /100 WBC
PCO2 BLDA: 46.1 MMHG (ref 35–45)
PH SMN: 7.41 [PH] (ref 7.35–7.45)
PLATELET # BLD AUTO: 459 K/UL (ref 150–450)
PMV BLD AUTO: 9.5 FL (ref 9.2–12.9)
PO2 BLDA: 26 MMHG (ref 40–60)
POC BE: 4 MMOL/L
POC SATURATED O2: 49 % (ref 95–100)
POC TCO2: 30 MMOL/L (ref 24–29)
POCT GLUCOSE: 136 MG/DL (ref 70–110)
POCT GLUCOSE: 151 MG/DL (ref 70–110)
POCT GLUCOSE: 155 MG/DL (ref 70–110)
POCT GLUCOSE: 158 MG/DL (ref 70–110)
POCT GLUCOSE: 200 MG/DL (ref 70–110)
POTASSIUM SERPL-SCNC: 2.7 MMOL/L (ref 3.5–5.1)
POTASSIUM SERPL-SCNC: 2.7 MMOL/L (ref 3.5–5.1)
POTASSIUM SERPL-SCNC: 4.2 MMOL/L (ref 3.5–5.1)
POTASSIUM SERPL-SCNC: 4.4 MMOL/L (ref 3.5–5.1)
PROT SERPL-MCNC: 8 G/DL (ref 6–8.4)
PROTHROMBIN TIME: 25.3 SEC (ref 9–12.5)
RBC # BLD AUTO: 3.1 M/UL (ref 4.6–6.2)
SAMPLE: ABNORMAL
SITE: ABNORMAL
SODIUM SERPL-SCNC: 135 MMOL/L (ref 136–145)
SODIUM SERPL-SCNC: 136 MMOL/L (ref 136–145)
VANCOMYCIN SERPL-MCNC: 15.3 UG/ML
WBC # BLD AUTO: 12.44 K/UL (ref 3.9–12.7)

## 2022-07-20 PROCEDURE — 99900035 HC TECH TIME PER 15 MIN (STAT)

## 2022-07-20 PROCEDURE — 84132 ASSAY OF SERUM POTASSIUM: CPT | Performed by: INTERNAL MEDICINE

## 2022-07-20 PROCEDURE — 25000003 PHARM REV CODE 250: Performed by: PHYSICIAN ASSISTANT

## 2022-07-20 PROCEDURE — 25000003 PHARM REV CODE 250: Performed by: INTERNAL MEDICINE

## 2022-07-20 PROCEDURE — 63600175 PHARM REV CODE 636 W HCPCS: Performed by: PHYSICIAN ASSISTANT

## 2022-07-20 PROCEDURE — 20600001 HC STEP DOWN PRIVATE ROOM

## 2022-07-20 PROCEDURE — 99232 SBSQ HOSP IP/OBS MODERATE 35: CPT | Mod: ,,, | Performed by: NURSE PRACTITIONER

## 2022-07-20 PROCEDURE — 80048 BASIC METABOLIC PNL TOTAL CA: CPT | Mod: XB | Performed by: INTERNAL MEDICINE

## 2022-07-20 PROCEDURE — 25000003 PHARM REV CODE 250: Performed by: STUDENT IN AN ORGANIZED HEALTH CARE EDUCATION/TRAINING PROGRAM

## 2022-07-20 PROCEDURE — 99233 SBSQ HOSP IP/OBS HIGH 50: CPT | Mod: ,,, | Performed by: INTERNAL MEDICINE

## 2022-07-20 PROCEDURE — 25000003 PHARM REV CODE 250

## 2022-07-20 PROCEDURE — 63600175 PHARM REV CODE 636 W HCPCS: Performed by: INTERNAL MEDICINE

## 2022-07-20 PROCEDURE — 83735 ASSAY OF MAGNESIUM: CPT | Performed by: INTERNAL MEDICINE

## 2022-07-20 PROCEDURE — 80202 ASSAY OF VANCOMYCIN: CPT | Performed by: INTERNAL MEDICINE

## 2022-07-20 PROCEDURE — 83615 LACTATE (LD) (LDH) ENZYME: CPT | Performed by: THORACIC SURGERY (CARDIOTHORACIC VASCULAR SURGERY)

## 2022-07-20 PROCEDURE — 86140 C-REACTIVE PROTEIN: CPT | Performed by: STUDENT IN AN ORGANIZED HEALTH CARE EDUCATION/TRAINING PROGRAM

## 2022-07-20 PROCEDURE — 25000003 PHARM REV CODE 250: Performed by: THORACIC SURGERY (CARDIOTHORACIC VASCULAR SURGERY)

## 2022-07-20 PROCEDURE — 85730 THROMBOPLASTIN TIME PARTIAL: CPT | Performed by: STUDENT IN AN ORGANIZED HEALTH CARE EDUCATION/TRAINING PROGRAM

## 2022-07-20 PROCEDURE — 99233 PR SUBSEQUENT HOSPITAL CARE,LEVL III: ICD-10-PCS | Mod: ,,, | Performed by: INTERNAL MEDICINE

## 2022-07-20 PROCEDURE — 99233 SBSQ HOSP IP/OBS HIGH 50: CPT | Mod: FS,,, | Performed by: INTERNAL MEDICINE

## 2022-07-20 PROCEDURE — 80053 COMPREHEN METABOLIC PANEL: CPT

## 2022-07-20 PROCEDURE — 25000003 PHARM REV CODE 250: Performed by: NURSE PRACTITIONER

## 2022-07-20 PROCEDURE — 83880 ASSAY OF NATRIURETIC PEPTIDE: CPT | Performed by: STUDENT IN AN ORGANIZED HEALTH CARE EDUCATION/TRAINING PROGRAM

## 2022-07-20 PROCEDURE — 93750 PR INTERROGATE VENT ASSIST DEV, IN PERSON, W PHYSICIAN ANALYSIS: ICD-10-PCS | Mod: ,,, | Performed by: INTERNAL MEDICINE

## 2022-07-20 PROCEDURE — 25000003 PHARM REV CODE 250: Performed by: ANESTHESIOLOGY

## 2022-07-20 PROCEDURE — A4216 STERILE WATER/SALINE, 10 ML: HCPCS | Performed by: THORACIC SURGERY (CARDIOTHORACIC VASCULAR SURGERY)

## 2022-07-20 PROCEDURE — 63600175 PHARM REV CODE 636 W HCPCS: Performed by: STUDENT IN AN ORGANIZED HEALTH CARE EDUCATION/TRAINING PROGRAM

## 2022-07-20 PROCEDURE — 85610 PROTHROMBIN TIME: CPT | Performed by: PHYSICIAN ASSISTANT

## 2022-07-20 PROCEDURE — 99233 PR SUBSEQUENT HOSPITAL CARE,LEVL III: ICD-10-PCS | Mod: FS,,, | Performed by: INTERNAL MEDICINE

## 2022-07-20 PROCEDURE — 85384 FIBRINOGEN ACTIVITY: CPT | Performed by: STUDENT IN AN ORGANIZED HEALTH CARE EDUCATION/TRAINING PROGRAM

## 2022-07-20 PROCEDURE — 82803 BLOOD GASES ANY COMBINATION: CPT

## 2022-07-20 PROCEDURE — 99232 PR SUBSEQUENT HOSPITAL CARE,LEVL II: ICD-10-PCS | Mod: ,,, | Performed by: NURSE PRACTITIONER

## 2022-07-20 PROCEDURE — 85025 COMPLETE CBC W/AUTO DIFF WBC: CPT | Performed by: INTERNAL MEDICINE

## 2022-07-20 PROCEDURE — 94761 N-INVAS EAR/PLS OXIMETRY MLT: CPT

## 2022-07-20 PROCEDURE — 93750 INTERROGATION VAD IN PERSON: CPT | Mod: ,,, | Performed by: INTERNAL MEDICINE

## 2022-07-20 PROCEDURE — 63600175 PHARM REV CODE 636 W HCPCS: Performed by: NURSE PRACTITIONER

## 2022-07-20 PROCEDURE — 27000248 HC VAD-ADDITIONAL DAY

## 2022-07-20 RX ORDER — POTASSIUM CHLORIDE 20 MEQ/1
60 TABLET, EXTENDED RELEASE ORAL
Status: COMPLETED | OUTPATIENT
Start: 2022-07-20 | End: 2022-07-20

## 2022-07-20 RX ORDER — WARFARIN SODIUM 5 MG/1
5 TABLET ORAL DAILY
Status: DISCONTINUED | OUTPATIENT
Start: 2022-07-20 | End: 2022-07-23

## 2022-07-20 RX ORDER — SPIRONOLACTONE 25 MG/1
25 TABLET ORAL DAILY
Status: DISCONTINUED | OUTPATIENT
Start: 2022-07-20 | End: 2022-07-28 | Stop reason: HOSPADM

## 2022-07-20 RX ORDER — MAGNESIUM SULFATE HEPTAHYDRATE 40 MG/ML
2 INJECTION, SOLUTION INTRAVENOUS ONCE
Status: COMPLETED | OUTPATIENT
Start: 2022-07-20 | End: 2022-07-20

## 2022-07-20 RX ADMIN — TIZANIDINE 2 MG: 2 TABLET ORAL at 06:07

## 2022-07-20 RX ADMIN — GABAPENTIN 100 MG: 100 CAPSULE ORAL at 08:07

## 2022-07-20 RX ADMIN — INSULIN DETEMIR 22 UNITS: 100 INJECTION, SOLUTION SUBCUTANEOUS at 11:07

## 2022-07-20 RX ADMIN — INSULIN ASPART 1 UNITS: 100 INJECTION, SOLUTION INTRAVENOUS; SUBCUTANEOUS at 11:07

## 2022-07-20 RX ADMIN — INSULIN ASPART 2 UNITS: 100 INJECTION, SOLUTION INTRAVENOUS; SUBCUTANEOUS at 08:07

## 2022-07-20 RX ADMIN — DOBUTAMINE 2.5 MCG/KG/MIN: 12.5 INJECTION, SOLUTION, CONCENTRATE INTRAVENOUS at 02:07

## 2022-07-20 RX ADMIN — POTASSIUM CHLORIDE 40 MEQ: 20 TABLET, EXTENDED RELEASE ORAL at 08:07

## 2022-07-20 RX ADMIN — INSULIN ASPART 2 UNITS: 100 INJECTION, SOLUTION INTRAVENOUS; SUBCUTANEOUS at 05:07

## 2022-07-20 RX ADMIN — GABAPENTIN 100 MG: 100 CAPSULE ORAL at 09:07

## 2022-07-20 RX ADMIN — Medication 10 ML: at 06:07

## 2022-07-20 RX ADMIN — Medication 10 ML: at 12:07

## 2022-07-20 RX ADMIN — Medication 324 MG: at 08:07

## 2022-07-20 RX ADMIN — TIZANIDINE 2 MG: 2 TABLET ORAL at 09:07

## 2022-07-20 RX ADMIN — POTASSIUM CHLORIDE 60 MEQ: 20 TABLET, EXTENDED RELEASE ORAL at 08:07

## 2022-07-20 RX ADMIN — BUSPIRONE HYDROCHLORIDE 7.5 MG: 5 TABLET ORAL at 06:07

## 2022-07-20 RX ADMIN — TIZANIDINE 2 MG: 2 TABLET ORAL at 03:07

## 2022-07-20 RX ADMIN — CEFEPIME 1 G: 1 INJECTION, POWDER, FOR SOLUTION INTRAMUSCULAR; INTRAVENOUS at 11:07

## 2022-07-20 RX ADMIN — CEFEPIME 1 G: 1 INJECTION, POWDER, FOR SOLUTION INTRAMUSCULAR; INTRAVENOUS at 04:07

## 2022-07-20 RX ADMIN — POTASSIUM CHLORIDE 60 MEQ: 20 TABLET, EXTENDED RELEASE ORAL at 06:07

## 2022-07-20 RX ADMIN — WARFARIN SODIUM 5 MG: 5 TABLET ORAL at 05:07

## 2022-07-20 RX ADMIN — FUROSEMIDE 30 MG/HR: 10 INJECTION, SOLUTION INTRAMUSCULAR; INTRAVENOUS at 06:07

## 2022-07-20 RX ADMIN — SPIRONOLACTONE 25 MG: 25 TABLET, FILM COATED ORAL at 12:07

## 2022-07-20 RX ADMIN — MAGNESIUM SULFATE HEPTAHYDRATE 2 G: 2 INJECTION, SOLUTION INTRAVENOUS at 06:07

## 2022-07-20 RX ADMIN — POLYETHYLENE GLYCOL 3350 17 G: 17 POWDER, FOR SOLUTION ORAL at 08:07

## 2022-07-20 RX ADMIN — TRAZODONE HYDROCHLORIDE 50 MG: 50 TABLET ORAL at 09:07

## 2022-07-20 RX ADMIN — FUROSEMIDE 30 MG/HR: 10 INJECTION, SOLUTION INTRAMUSCULAR; INTRAVENOUS at 02:07

## 2022-07-20 RX ADMIN — VANCOMYCIN HYDROCHLORIDE 1250 MG: 1.25 INJECTION, POWDER, LYOPHILIZED, FOR SOLUTION INTRAVENOUS at 03:07

## 2022-07-20 RX ADMIN — ACETAMINOPHEN 650 MG: 325 TABLET ORAL at 09:07

## 2022-07-20 RX ADMIN — ACETAMINOPHEN 650 MG: 325 TABLET ORAL at 03:07

## 2022-07-20 RX ADMIN — CEFEPIME 1 G: 1 INJECTION, POWDER, FOR SOLUTION INTRAMUSCULAR; INTRAVENOUS at 06:07

## 2022-07-20 RX ADMIN — GABAPENTIN 100 MG: 100 CAPSULE ORAL at 03:07

## 2022-07-20 RX ADMIN — FAMOTIDINE 40 MG: 20 TABLET ORAL at 08:07

## 2022-07-20 RX ADMIN — INSULIN ASPART 10 UNITS: 100 INJECTION, SOLUTION INTRAVENOUS; SUBCUTANEOUS at 05:07

## 2022-07-20 RX ADMIN — INSULIN ASPART 10 UNITS: 100 INJECTION, SOLUTION INTRAVENOUS; SUBCUTANEOUS at 08:07

## 2022-07-20 RX ADMIN — ACETAMINOPHEN 650 MG: 325 TABLET ORAL at 06:07

## 2022-07-20 RX ADMIN — POTASSIUM CHLORIDE 40 MEQ: 20 TABLET, EXTENDED RELEASE ORAL at 09:07

## 2022-07-20 RX ADMIN — INSULIN ASPART 10 UNITS: 100 INJECTION, SOLUTION INTRAVENOUS; SUBCUTANEOUS at 12:07

## 2022-07-20 RX ADMIN — POTASSIUM CHLORIDE 40 MEQ: 20 TABLET, EXTENDED RELEASE ORAL at 03:07

## 2022-07-20 RX ADMIN — POTASSIUM CHLORIDE 60 MEQ: 20 TABLET, EXTENDED RELEASE ORAL at 10:07

## 2022-07-20 NOTE — SUBJECTIVE & OBJECTIVE
Interval History: Patient had some shaking yesterday afternoon that was concerned for seizure activity yesterday afternoon.  CT of head negative and neurology consulted.  Recommending EEG and changing Cefepime to something else.  Will consult with ID.  He had another episode of shaking this morning while going from sitting to standing.  Orthostatics done and negative.  Will get device interrogation.  K was low this morning.  Lasix drip held and replacement ordered.  Repeat labs pending.  Patient is net negative 3.7L in the last 24 hours.  CVP: 11, SVO2: 49, CO: 6.18, CI: 2.89, SVR: 750.  Adding Spironolactone today and re dosing coumadin as it has been held for last couple days and INR is 2.6 today down from 4.4 yesterday    Continuous Infusions:   sodium chloride 0.9% 5 mL/hr at 06/27/22 0055    sodium chloride 0.9% Stopped (07/18/22 2000)    DOBUTamine IV infusion (non-titrating) 2.5 mcg/kg/min (07/20/22 0211)    furosemide (LASIX) 2 mg/mL continuous infusion (non-titrating) Stopped (07/20/22 0545)     Scheduled Meds:   acetaminophen  650 mg Oral Q8H    busPIRone  7.5 mg Oral Daily    ceFEPime (MAXIPIME) IVPB  1 g Intravenous Q8H    famotidine  40 mg Oral Daily    ferrous gluconate  324 mg Oral Daily with breakfast    gabapentin  100 mg Oral TID    insulin aspart U-100  10 Units Subcutaneous TIDWM    insulin detemir U-100  22 Units Subcutaneous QHS    INV aspirin/placebo  100 mg Oral Daily    polyethylene glycol  17 g Oral Daily    potassium chloride  40 mEq Oral TID    sodium chloride 0.9%  10 mL Intravenous Q6H    spironolactone  25 mg Oral Daily    tiZANidine  2 mg Oral Q8H    traZODone  50 mg Oral QHS    vancomycin (VANCOCIN) IVPB  1,250 mg Intravenous Q12H    warfarin  5 mg Oral Daily     PRN Meds:albuterol sulfate, bisacodyL, dextrose 10%, dextrose 10%, docusate sodium, glucagon (human recombinant), glucose, glucose, insulin aspart U-100, insulin aspart U-100, magnesium sulfate IVPB, ondansetron, oxyCODONE,  polyethylene glycol, promethazine (PHENERGAN) IVPB, sodium chloride 0.9%, sodium chloride 0.9%, Flushing PICC Protocol **AND** sodium chloride 0.9% **AND** sodium chloride 0.9%, traMADoL, Pharmacy to dose Vancomycin consult **AND** vancomycin - pharmacy to dose    Review of patient's allergies indicates:   Allergen Reactions    Aspirin Other (See Comments)     Mr. Thacker is enrolled in Dr. Paige's Alon Trial and cannot have any aspirin and/or aspirin-containing products. DO NOT cancel any orders for INV Aspirin 100 mg/Placebo. If you have questions, please contact Isabel @ [phone], [phone],[email or web address], secure chat or MS Teams message.    Bumex [bumetanide] Hives    Lactose Diarrhea     Other reaction(s): Abdominal distension, gaseous    Torsemide Hives     Objective:     Vital Signs (Most Recent):  Temp: 98 °F (36.7 °C) (07/20/22 1152)  Pulse: 92 (07/20/22 1200)  Resp: 16 (07/20/22 1152)  BP: (!) 82/0 (07/20/22 1200)  SpO2: 99 % (07/20/22 1152)   Vital Signs (24h Range):  Temp:  [97.2 °F (36.2 °C)-98 °F (36.7 °C)] 98 °F (36.7 °C)  Pulse:  [76-98] 92  Resp:  [12-20] 16  SpO2:  [95 %-99 %] 99 %  BP: ()/(0-74) 82/0     Patient Vitals for the past 72 hrs (Last 3 readings):   Weight   07/20/22 0800 84.1 kg (185 lb 6.5 oz)   07/19/22 1559 86.6 kg (191 lb)   07/18/22 1130 86.6 kg (191 lb)       Body mass index is 23.17 kg/m².      Intake/Output Summary (Last 24 hours) at 7/20/2022 1354  Last data filed at 7/20/2022 1006  Gross per 24 hour   Intake 1400.29 ml   Output 3900 ml   Net -2499.71 ml       Hemodynamic Parameters: CVP 11       Telemetry: ST    Physical Exam  Vitals and nursing note reviewed.   Constitutional:       Appearance: Normal appearance.   HENT:      Head: Normocephalic and atraumatic.   Eyes:      Extraocular Movements: Extraocular movements intact.      Conjunctiva/sclera: Conjunctivae normal.   Cardiovascular:      Rate and Rhythm: Regular rhythm. Tachycardia present.       Comments: Smooth VAD hum  Pulmonary:      Breath sounds: Normal breath sounds.   Abdominal:      Palpations: Abdomen is soft.   Musculoskeletal:         General: No swelling.      Cervical back: Neck supple.      Right lower leg: No edema.      Left lower leg: No edema.   Skin:     General: Skin is dry.      Capillary Refill: Capillary refill takes 2 to 3 seconds.   Neurological:      General: No focal deficit present.      Mental Status: He is alert and oriented to person, place, and time.      Motor: No weakness.   Psychiatric:         Mood and Affect: Mood normal.         Behavior: Behavior normal.         Thought Content: Thought content normal.         Judgment: Judgment normal.       Significant Labs:  CBC:  Recent Labs   Lab 07/18/22 1708 07/18/22 2021 07/19/22  0228   WBC 9.34 20.78* 20.07*   RBC 2.89* 2.90* 2.99*   HGB 7.4* 7.3* 7.4*   HCT 24.5* 23.1* 23.7*   * 390 396   MCV 85 80* 79*   MCH 25.6* 25.2* 24.7*   MCHC 30.2* 31.6* 31.2*       BNP:  Recent Labs   Lab 07/14/22  0333 07/18/22 0311 07/20/22  0358   * 408* 496*       CMP:  Recent Labs   Lab 07/18/22  0311 07/18/22  1708 07/19/22 0228 07/19/22  1825 07/20/22  0358   * 159* 209*  --  182*   CALCIUM 9.3 9.3 9.1  --  9.3   ALBUMIN 2.8*  --  2.9*  --  2.9*   PROT 7.7  --  7.8  --  8.0   * 130* 129*  --  135*   K 3.1* 3.5  3.5 3.6  3.6 3.1* 2.7*  2.7*   CO2 26 24 22*  --  27   CL 96 96 96  --  96   BUN 19 17 17  --  15   CREATININE 1.5* 1.5* 1.8*  --  1.3   ALKPHOS 156*  --  159*  --  145*   ALT 22  --  19  --  17   AST 34  --  29  --  28   BILITOT 0.9  --  1.1*  --  0.9        Coagulation:   Recent Labs   Lab 07/18/22  0311 07/19/22 0228 07/20/22  0358   INR 4.1* 4.4* 2.6*   APTT 35.4* 36.0* 34.5*       LDH:  Recent Labs   Lab 07/18/22 0311 07/19/22 0228 07/20/22  0358   * 319* 290*       Microbiology:  Microbiology Results (last 7 days)       Procedure Component Value Units Date/Time    Blood culture  [[id number]] Collected: 07/19/22 0006    Order Status: Completed Specimen: Blood from Peripheral, Antecubital, Left Updated: 07/20/22 0613     Blood Culture, Routine No Growth to date      No Growth to date            I have reviewed all pertinent labs within the past 24 hours.    Estimated Creatinine Clearance: 92.5 mL/min (based on SCr of 1.3 mg/dL).    Diagnostic Results:  I have reviewed and interpreted all pertinent imaging results/findings within the past 24 hours.

## 2022-07-20 NOTE — PROGRESS NOTES
07/20/2022  Mirela Perez    Current provider:  Josh Pulido MD    Device interrogation:  TXP LVAD INTERROGATIONS 7/20/2022 7/20/2022 7/20/2022 7/19/2022 7/19/2022 7/19/2022 7/19/2022   Type HeartMate3 HeartMate3 HeartMate3 HeartMate3 HeartMate3 HeartMate3 HeartMate3   Flow 4.0 4.3 4 4.5 4.0 4.4 4.2   Speed 5100 5100 5100 5100 5100 5100 5100   PI 4.8 3.3 3.9 3.4 4.6 3.5 4.3   Power (Serna) 3.6 3.6 3.6 3.7 3.6 3.6 3.5   LSL 4700 4700 4700 4700 - 4800 4800   Pulsatility Intermittent pulse - - - - Intermittent pulse Intermittent pulse          Rounded on Kevan Queen to ensure all mechanical assist device settings (IABP or VAD) were appropriate and all parameters were within limits.  I was able to ensure all back up equipment was present, the staff had no issues, and the Perfusion Department daily rounding was complete.      For implantable VADs: Interrogation of Ventricular assist device was performed with analysis of device parameters and review of device function. I have personally reviewed the interrogation findings and agree with findings as stated.     In emergency, the nursing units have been notified to contact the perfusion department either by:  Calling [id number] from 630am to 4pm Mon thru Fri, utilizing the On-Call Finder functionality of Epic and searching for Perfusion, or by contacting the hospital  from 4pm to 630am and on weekends and asking to speak with the perfusionist on call.    10:29 AM

## 2022-07-20 NOTE — NURSING
Lab notified RN about pt's K+ 2.7. Mag 1.8. MD Kwong notified and PO 60 mg potassium Q2H x 3 and Mag sulfate 2 g IV ordered, and instructed to pause Lasix drip.

## 2022-07-20 NOTE — ASSESSMENT & PLAN NOTE
- NIDCM.  - Was not evaluated for OHTx as he quit smoking in April 2022.   -Diuresing well on Lasix drip  - GDMT: will start Spironolactone   - See LVAD

## 2022-07-20 NOTE — PROGRESS NOTES
07/20/22 0806 07/20/22 0811   Vital Signs   Temp 97.7 °F (36.5 °C)  --    Temp src Temporal  --    Pulse 84  --    Heart Rate Source Monitor  --    Resp 16  --    SpO2 98 %  --    O2 Device (Oxygen Therapy) room air  --    /66 (!) 84/0   MAP (mmHg) 79  --    BP Location Left arm Left arm   BP Method Automatic cNIBP   Patient Position Lying Lying     Pt had another seizure like episode when pt stood up for standing weight.Pt was sitting on the edge of the bed,leaning to his right side and shaking.Episode lasted for less than 10 secs.Neurologically intact, AAOX4,VSS.DEYA Martinez and Cony Arboleda,VAD coordinator notified.Ordered to keep on monitoring pt.

## 2022-07-20 NOTE — PROGRESS NOTES
Cardiac device interrogation and/or reprogramming completed by industry representative, Maikol with [employer]; please refer to report located in the Media tab.

## 2022-07-20 NOTE — ASSESSMENT & PLAN NOTE
Endocrinology consulted for BG management.   BG goal 140-180    - Continue levemir 22 units HS.   - Continue Novolog 10 units TIDWM and prn for BG excursions MDC (150/25) SSI.  - Continue Novolog 4 units prn for overnight snack  - BG monitoring ac/hs and moderate dose correction scale.   - Hypoglycemia protocol in place.     ** Please notify Endocrine for any change and/or advance in diet**  ** Please call Endocrine for any BG related issues **    Discharge Planning:   TBD. Please notify endocrinology prior to discharge.

## 2022-07-20 NOTE — PROGRESS NOTES
"[facility] - [facility] Step[facility]  Endocrinology  Progress Note    Admit Date: 2022     Reason for Consult: Management of  type 2 DM, Hyperglycemia     Surgical Procedure and Date: none    Diabetes diagnosis year: 21    Home Diabetes Medications:  Detemir  23  units daily and Aspart   12  units TIDWM and  Mod dose correction insulin    Lab Results   Component Value Date    HGBA1C 9.2 (H) 2022           How often checking glucose at home? 1-3 x day   BG readings on regimen: 180- up to 300 once  Hypoglycemia on the regimen?  yes once- related to not really eating after taking aspart   Missed doses on regimen?  no    Diabetes Complications include:     Hyperglycemia    Complicating diabetes co morbidities:   History of MI, CHF, and CKD      HPI:   Patient is a 37 y.o. male with a diagnosis of type 2 DM and NIDCM with BiV systolic heart failure. Patient was presented at [facility]  and was  approved for VAD. Also recently admitted with [facility]; he had clinic appointment last week to f/u recent admit for hyperglycemic hyperosmolar syndrome but did not come as he was "feeling too bad" presents to  ED with SOB. Endocrinology consulted for BG/ DM management.                Interval HPI:   Overnight events: No acute events overnight. Hypokalemia noted this morning. Patient on the CSU in room 316/316 A. Blood glucose stable. BG at and above goal on current insulin regimen (SSI, prandial, and basal insulin ). Steroid use- None. 20 Days Post-Op  Renal function- Normal  Vasopressors-  None       Diet Cardiac [facility]; Consistent Carbohydrate; Fluid - 1500mL    Eatin%  Nausea: No  Hypoglycemia and intervention: No  Fever: No  TPN and/or TF: No      BP (!) 84/0 (BP Location: Left arm, Patient Position: Lying)   Pulse 84   Temp 97.7 °F (36.5 °C) (Temporal)   Resp 16   Ht 6' 3" (1.905 m)   Wt 84.1 kg (185 lb 6.5 oz)   SpO2 98%   BMI 23.17 kg/m²     Labs Reviewed and Include    Recent Labs   Lab " 07/20/22  0358   *   CALCIUM 9.3   ALBUMIN 2.9*   PROT 8.0   *   K 2.7*  2.7*   CO2 27   CL 96   BUN 15   CREATININE 1.3   ALKPHOS 145*   ALT 17   AST 28   BILITOT 0.9     Lab Results   Component Value Date    WBC 20.07 (H) 07/19/2022    HGB 7.4 (L) 07/19/2022    HCT 23.7 (L) 07/19/2022    MCV 79 (L) 07/19/2022     07/19/2022     No results for input(s): TSH, FREET4 in the last 168 hours.  Lab Results   Component Value Date    HGBA1C 9.2 (H) 05/20/2022       Nutritional status:   Body mass index is 23.17 kg/m².  Lab Results   Component Value Date    ALBUMIN 2.9 (L) 07/20/2022    ALBUMIN 2.9 (L) 07/19/2022    ALBUMIN 2.8 (L) 07/18/2022     Lab Results   Component Value Date    PREALBUMIN 12 (L) 07/17/2022    PREALBUMIN 12 (L) 07/14/2022    PREALBUMIN 12 (L) 07/07/2022       Estimated Creatinine Clearance: 92.5 mL/min (based on SCr of 1.3 mg/dL).    Accu-Checks  Recent Labs     07/18/22  1344 07/18/22  1710 07/18/22  2021 07/18/22  2153 07/19/22  0242 07/19/22  0815 07/19/22  1247 07/19/22  1645 07/19/22  2109 07/20/22  0814   POCTGLUCOSE 184* 186* 212* 170* 280* 166* 158* 126* 155* 151*       Current Medications and/or Treatments Impacting Glycemic Control  Immunotherapy:    Immunosuppressants       None          Steroids:   Hormones (From admission, onward)                None          Pressors:    Autonomic Drugs (From admission, onward)                None          Hyperglycemia/Diabetes Medications:   Antihyperglycemics (From admission, onward)                Start     Stop Route Frequency Ordered    07/18/22 1503  insulin aspart U-100 pen 4 Units         -- SubQ As needed (PRN) 07/18/22 1404    07/16/22 2100  insulin detemir U-100 pen 22 Units         -- SubQ Nightly 07/16/22 0636    07/13/22 1130  insulin aspart U-100 pen 10 Units         -- SubQ 3 times daily with meals 07/13/22 1106    07/10/22 1718  insulin aspart U-100 pen 1-10 Units         -- SubQ Before meals & nightly PRN 07/10/22  1618            ASSESSMENT and PLAN    * LVAD (left ventricular assist device) present  Managed per primary team  Avoid hypoglycemia        Uncontrolled diabetes mellitus  Endocrinology consulted for BG management.   BG goal 140-180    - Continue levemir 22 units HS.   - Continue Novolog 10 units TIDWM and prn for BG excursions [facility] (150/25) SSI.  - Continue Novolog 4 units prn for overnight snack  - BG monitoring ac/hs and moderate dose correction scale.   - Hypoglycemia protocol in place.     ** Please notify Endocrine for any change and/or advance in diet**  ** Please call Endocrine for any BG related issues **    Discharge Planning:   TBD. Please notify endocrinology prior to discharge.        Acute on chronic combined systolic and diastolic heart failure, NYHA class 4  Optimize glucose control and  avoid hypoglycemia    Managed per primary.       CKD (chronic kidney disease) stage 3, GFR 30-59 ml/min    Titrate insulin slowly to avoid hypoglycemia as the risk of hypoglycemia increases with decreased creatinine clearance.    Estimated Creatinine Clearance: 92.5 mL/min (based on SCr of 1.3 mg/dL).      Surgical wound present  Optimize BG for surgical wound healing.              Michael Wyman, DNP, FNP  Endocrinology  [facility] - [facility] Step[facility]

## 2022-07-20 NOTE — PROGRESS NOTES
[facility] - [facility] Step[facility]  Heart Transplant  Progress Note    Patient Name: Kevan Queen  MRN: [id number]  Admission Date: 6/13/2022  Hospital Length of Stay: 37 days  Attending Physician: Angela Yang DO  Primary Care Provider: ORALIA Cline  Principal Problem:LVAD (left ventricular assist device) present    Subjective:     Interval History: Patient had some shaking yesterday afternoon that was concerned for seizure activity yesterday afternoon.  CT of head negative and neurology consulted.  Recommending EEG and changing Cefepime to something else.  Will consult with ID.  He had another episode of shaking this morning while going from sitting to standing.  Orthostatics done and negative.  Will get device interrogation.  K was low this morning.  Lasix drip held and replacement ordered.  Repeat labs pending.  Patient is net negative 3.7L in the last 24 hours.  CVP: 11, SVO2: 49, CO: 6.18, CI: 2.89, SVR: 750.  Adding Spironolactone today and re dosing coumadin as it has been held for last couple days and INR is 2.6 today down from 4.4 yesterday    Continuous Infusions:   sodium chloride 0.9% 5 mL/hr at 06/27/22 0055    sodium chloride 0.9% Stopped (07/18/22 2000)    DOBUTamine IV infusion (non-titrating) 2.5 mcg/kg/min (07/20/22 0211)    furosemide (LASIX) 2 mg/mL continuous infusion (non-titrating) Stopped (07/20/22 0545)     Scheduled Meds:   acetaminophen  650 mg Oral Q8H    busPIRone  7.5 mg Oral Daily    ceFEPime (MAXIPIME) IVPB  1 g Intravenous Q8H    famotidine  40 mg Oral Daily    ferrous gluconate  324 mg Oral Daily with breakfast    gabapentin  100 mg Oral TID    insulin aspart U-100  10 Units Subcutaneous TIDWM    insulin detemir U-100  22 Units Subcutaneous QHS    INV aspirin/placebo  100 mg Oral Daily    polyethylene glycol  17 g Oral Daily    potassium chloride  40 mEq Oral TID    sodium chloride 0.9%  10 mL Intravenous Q6H    spironolactone  25 mg Oral Daily     tiZANidine  2 mg Oral Q8H    traZODone  50 mg Oral QHS    vancomycin (VANCOCIN) IVPB  1,250 mg Intravenous Q12H    warfarin  5 mg Oral Daily     PRN Meds:albuterol sulfate, bisacodyL, dextrose 10%, dextrose 10%, docusate sodium, glucagon (human recombinant), glucose, glucose, insulin aspart U-100, insulin aspart U-100, magnesium sulfate IVPB, ondansetron, oxyCODONE, polyethylene glycol, promethazine (PHENERGAN) IVPB, sodium chloride 0.9%, sodium chloride 0.9%, Flushing PICC Protocol **AND** sodium chloride 0.9% **AND** sodium chloride 0.9%, traMADoL, Pharmacy to dose Vancomycin consult **AND** vancomycin - pharmacy to dose    Review of patient's allergies indicates:   Allergen Reactions    Aspirin Other (See Comments)     Mr. Thacker is enrolled in Dr. Paige's Alon Trial and cannot have any aspirin and/or aspirin-containing products. DO NOT cancel any orders for INV Aspirin 100 mg/Placebo. If you have questions, please contact Isabel @ [phone], [phone],[email or web address], secure chat or MS Teams message.    Bumex [bumetanide] Hives    Lactose Diarrhea     Other reaction(s): Abdominal distension, gaseous    Torsemide Hives     Objective:     Vital Signs (Most Recent):  Temp: 98 °F (36.7 °C) (07/20/22 1152)  Pulse: 92 (07/20/22 1200)  Resp: 16 (07/20/22 1152)  BP: (!) 82/0 (07/20/22 1200)  SpO2: 99 % (07/20/22 1152)   Vital Signs (24h Range):  Temp:  [97.2 °F (36.2 °C)-98 °F (36.7 °C)] 98 °F (36.7 °C)  Pulse:  [76-98] 92  Resp:  [12-20] 16  SpO2:  [95 %-99 %] 99 %  BP: ()/(0-74) 82/0     Patient Vitals for the past 72 hrs (Last 3 readings):   Weight   07/20/22 0800 84.1 kg (185 lb 6.5 oz)   07/19/22 1559 86.6 kg (191 lb)   07/18/22 1130 86.6 kg (191 lb)       Body mass index is 23.17 kg/m².      Intake/Output Summary (Last 24 hours) at 7/20/2022 1354  Last data filed at 7/20/2022 1006  Gross per 24 hour   Intake 1400.29 ml   Output 3900 ml   Net -2499.71 ml       Hemodynamic Parameters: CVP  11       Telemetry: ST    Physical Exam  Vitals and nursing note reviewed.   Constitutional:       Appearance: Normal appearance.   HENT:      Head: Normocephalic and atraumatic.   Eyes:      Extraocular Movements: Extraocular movements intact.      Conjunctiva/sclera: Conjunctivae normal.   Cardiovascular:      Rate and Rhythm: Regular rhythm. Tachycardia present.      Comments: Smooth VAD hum  Pulmonary:      Breath sounds: Normal breath sounds.   Abdominal:      Palpations: Abdomen is soft.   Musculoskeletal:         General: No swelling.      Cervical back: Neck supple.      Right lower leg: No edema.      Left lower leg: No edema.   Skin:     General: Skin is dry.      Capillary Refill: Capillary refill takes 2 to 3 seconds.   Neurological:      General: No focal deficit present.      Mental Status: He is alert and oriented to person, place, and time.      Motor: No weakness.   Psychiatric:         Mood and Affect: Mood normal.         Behavior: Behavior normal.         Thought Content: Thought content normal.         Judgment: Judgment normal.       Significant Labs:  CBC:  Recent Labs   Lab 07/18/22 1708 07/18/22 2021 07/19/22  0228   WBC 9.34 20.78* 20.07*   RBC 2.89* 2.90* 2.99*   HGB 7.4* 7.3* 7.4*   HCT 24.5* 23.1* 23.7*   * 390 396   MCV 85 80* 79*   MCH 25.6* 25.2* 24.7*   MCHC 30.2* 31.6* 31.2*       BNP:  Recent Labs   Lab 07/14/22  0333 07/18/22  0311 07/20/22  0358   * 408* 496*       CMP:  Recent Labs   Lab 07/18/22  0311 07/18/22  1708 07/19/22 0228 07/19/22  1825 07/20/22  0358   * 159* 209*  --  182*   CALCIUM 9.3 9.3 9.1  --  9.3   ALBUMIN 2.8*  --  2.9*  --  2.9*   PROT 7.7  --  7.8  --  8.0   * 130* 129*  --  135*   K 3.1* 3.5  3.5 3.6  3.6 3.1* 2.7*  2.7*   CO2 26 24 22*  --  27   CL 96 96 96  --  96   BUN 19 17 17  --  15   CREATININE 1.5* 1.5* 1.8*  --  1.3   ALKPHOS 156*  --  159*  --  145*   ALT 22  --  19  --  17   AST 34  --  29  --  28   BILITOT 0.9   "--  1.1*  --  0.9        Coagulation:   Recent Labs   Lab 07/18/22  0311 07/19/22 0228 07/20/22  0358   INR 4.1* 4.4* 2.6*   APTT 35.4* 36.0* 34.5*       LDH:  Recent Labs   Lab 07/18/22  0311 07/19/22 0228 07/20/22  0358   * 319* 290*       Microbiology:  Microbiology Results (last 7 days)       Procedure Component Value Units Date/Time    Blood culture [[id number]] Collected: 07/19/22 0006    Order Status: Completed Specimen: Blood from Peripheral, Antecubital, Left Updated: 07/20/22 0613     Blood Culture, Routine No Growth to date      No Growth to date            I have reviewed all pertinent labs within the past 24 hours.    Estimated Creatinine Clearance: 92.5 mL/min (based on SCr of 1.3 mg/dL).    Diagnostic Results:  I have reviewed and interpreted all pertinent imaging results/findings within the past 24 hours.    Assessment and Plan:     38 yo male with NIDCM with BiV systolic heart failure, on home Milrinone at 0.375 mcg/kg/min, presented at [facility] 4/2/22 ,was not evaluated for OHT as he has recently quit smoking in April 2022 but was approved for VAD with plan to begin OHT evaluation in upcoming months if Mr Queen is stable and suitable for OHT eval (blood group A), 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 Went to ED at [facility] 6/10 but left after waiting 4 hours. Had clinic appointment with us today, but arrived to [facility] early this morning and decided to go to the ED instead. Baseline Lasix dose is 80 mg bid. Reports taking 240 mg qd past 3 days with no improvement. BNP is 1701, up from 898 on 6/2 and 49 on 5/24. sCr is 1.8 with baseline ~ 1.5-1.7. sPO2 on RA is " 93%. Wife at bedside    He has been given Lasix 80 mg IVP in the ED with plan to start Lasix gtt at 20 mg/hr           * LVAD (left ventricular assist device) present  - S/P DT HM3 with MVR plus Protek Duo for RV support 6/29 (Grecia)  - RVAD removed with concern for hemolysis.  - HTS primary   - GASTON trial   - INR was supratherapeuitc and coumadin was held. Goal 2.0-3.0 will restart coumadin today  - LDH stable  - CVP 11. Cont. IV Lasix gtt to 30 mg/hr.   - ECHO 7/19 with speed at 5100- EF: 10%, LVEDD: 6mc (up from 4.24 with speed at 5200)      Procedure: Device Interrogation Including analysis of device parameters  Current Settings: Ventricular Assist Device  Review of device function is stable    TXP LVAD INTERROGATIONS 7/20/2022 7/20/2022 7/20/2022 7/20/2022 7/19/2022 7/19/2022 7/19/2022   Type HeartMate3 HeartMate3 HeartMate3 HeartMate3 HeartMate3 HeartMate3 HeartMate3   Flow 4.2 4.0 4.3 4 4.5 4.0 4.4   Speed 5100 5100 5100 5100 5100 5100 5100   PI 4.4 4.8 3.3 3.9 3.4 4.6 3.5   Power (Serna) 3.6 3.6 3.6 3.6 3.7 3.6 3.6   LSL 4700 4700 4700 4700 4700 - 4800   Pulsatility Intermittent pulse Intermittent pulse - - - - Intermittent pulse       Acute on chronic combined systolic and diastolic congestive heart failure, NYHA class 4  - NID.  - Was not evaluated for OHTx as he quit smoking in April 2022.   -Diuresing well on Lasix drip  - GDMT: will start Spironolactone   - See LVAD    Staphylococcus epidermidis bacteremia  - Blood cultures 6/20 and repeat 6/21 positive for Staph Epi. One of two blood cultures from 6/23 positive for Staph (taken prior to line exchange). Repeat cultures sent 6/25 NGTD  - IJ exchanged on 6/23, IABP exchanged 6/23  - ID recommended 14 day course of vancomycin from VAD implantation on 6/29 with end date: 7/12/22. Vancomycin discontinued per CTS with concerns for elevated sCr. ID with new recs to complete 7d course of daptomycin, which was completed 7/7/22    CKD (chronic kidney disease)  stage 3, GFR 30-59 ml/min  SCr baseline ~ 1.5-1.7    Leukocytosis  -on empiric Vanc and Cefepime  -ID consulted and following  -WBC has improved  -Will await further recommendations      Hyponatremia  -Hypervolemic, now improved  -Discontinued salt tablets   -Improvement in Na with diuresis    Shaking  -No post ictal period  -Appreciate Neurology consult.  EEG ordered for 1 hours  -orthostatic BP negative  -getting device interrogation       Uncontrolled diabetes mellitus  Admitted 5/24-5/27 with hyperglycemia hyperosmolar syndrome  - Hgb A1C 9.2 on 5/20/22  - Endocrine following, on insulin gtt    Tingling in extremities  - Pt reports paresthesias in right toes and fingers x 3 days   - Possible radiculopathy vs electrolyte abnormality vs neurovascular etiology   - Consulted General Neurology, suspect compression from surgical positioning/carpal tunnel syndrome. Recommended brace to L hand/wrist. If no improvement, will need outpatient EMG/NCS        DEYA Martinez  Heart Transplant  [facility] Step[facility]

## 2022-07-20 NOTE — PROGRESS NOTES
Pt aaox3 while sitting up in bed with simeon Page at bedside.  LVAD numbers WNL. Approximately 40 minutes was spent with the patient providing education. Assisted patient with completing workbook, pt has good recall of information. Pt denies any needs at this time. Encouraged pt to notify nurse if they have any questions, problems or concerns for LVAD coordinator.  Pt verbalized understanding and in agreement of plan. Will follow up with pt soon.    PATIENT IS NOT CHECKED OFF ON ALARMS  CAREGIVER Page IS NOT CHECKED OFF ON DRESSING CHANGE

## 2022-07-20 NOTE — SUBJECTIVE & OBJECTIVE
"Interval HPI:   Overnight events: No acute events overnight. Hypokalemia noted this morning. Patient stepped down to the CSU in room 316/316 A. Blood glucose stable. BG at and above goal on current insulin regimen (SSI, prandial, and basal insulin ). Steroid use- None. 20 Days Post-Op  Renal function- Abnormal - Creatinine 1.8   Vasopressors-  None       Diet Cardiac [facility]; Consistent Carbohydrate; Fluid - 1500mL    Eatin%  Nausea: No  Hypoglycemia and intervention: No  Fever: No  TPN and/or TF: No      BP (!) 84/0 (BP Location: Left arm, Patient Position: Lying)   Pulse 84   Temp 97.7 °F (36.5 °C) (Temporal)   Resp 16   Ht 6' 3" (1.905 m)   Wt 84.1 kg (185 lb 6.5 oz)   SpO2 98%   BMI 23.17 kg/m²     Labs Reviewed and Include    Recent Labs   Lab 22  0358   *   CALCIUM 9.3   ALBUMIN 2.9*   PROT 8.0   *   K 2.7*  2.7*   CO2 27   CL 96   BUN 15   CREATININE 1.3   ALKPHOS 145*   ALT 17   AST 28   BILITOT 0.9     Lab Results   Component Value Date    WBC 20.07 (H) 2022    HGB 7.4 (L) 2022    HCT 23.7 (L) 2022    MCV 79 (L) 2022     2022     No results for input(s): TSH, FREET4 in the last 168 hours.  Lab Results   Component Value Date    HGBA1C 9.2 (H) 2022       Nutritional status:   Body mass index is 23.17 kg/m².  Lab Results   Component Value Date    ALBUMIN 2.9 (L) 2022    ALBUMIN 2.9 (L) 2022    ALBUMIN 2.8 (L) 2022     Lab Results   Component Value Date    PREALBUMIN 12 (L) 2022    PREALBUMIN 12 (L) 2022    PREALBUMIN 12 (L) 2022       Estimated Creatinine Clearance: 92.5 mL/min (based on SCr of 1.3 mg/dL).    Accu-Checks  Recent Labs     22  1344 22  1710 22  2021 22  2153 22  0242 22  0815 22  1247 22  1645 22  2109 22  0814   POCTGLUCOSE 184* 186* 212* 170* 280* 166* 158* 126* 155* 151*       Current Medications and/or " Treatments Impacting Glycemic Control  Immunotherapy:    Immunosuppressants       None          Steroids:   Hormones (From admission, onward)                None          Pressors:    Autonomic Drugs (From admission, onward)                None          Hyperglycemia/Diabetes Medications:   Antihyperglycemics (From admission, onward)                Start     Stop Route Frequency Ordered    07/18/22 1503  insulin aspart U-100 pen 4 Units         -- SubQ As needed (PRN) 07/18/22 1404    07/16/22 2100  insulin detemir U-100 pen 22 Units         -- SubQ Nightly 07/16/22 0636    07/13/22 1130  insulin aspart U-100 pen 10 Units         -- SubQ 3 times daily with meals 07/13/22 1106    07/10/22 1718  insulin aspart U-100 pen 1-10 Units         -- SubQ Before meals & nightly PRN 07/10/22 1618

## 2022-07-20 NOTE — ASSESSMENT & PLAN NOTE
-on empiric Vanc and Cefepime  -ID consulted and following  -WBC has improved  -Will await further recommendations

## 2022-07-20 NOTE — ASSESSMENT & PLAN NOTE
Titrate insulin slowly to avoid hypoglycemia as the risk of hypoglycemia increases with decreased creatinine clearance.    Estimated Creatinine Clearance: 92.5 mL/min (based on SCr of 1.3 mg/dL).

## 2022-07-20 NOTE — PROGRESS NOTES
[facility] - Cardiology Stepdown  Infectious Disease  Progress Note    Patient Name: Kevan Queen  MRN: [id number]  Admission Date: 6/13/2022  Length of Stay: 37 days  Attending Physician: Angela Yang DO  Primary Care Provider: ORALIA Cline    Isolation Status: No active isolations  Assessment/Plan:      * LVAD (left ventricular assist device) present  37M s/p LVAD on 6/29, inadvertantly unplugged LVAD yesterday after he got tangled in lines, then developed chills. No documented fever. Started on empiric vanc and cefepime. Blood cx pending. No drainage from DLES, surgical site healing welll. Leukocytosis is now resolved.     Recommendations:  - continue current vanc and cefepime for total of 5 days  - if ongoing symptoms, will evaluate further    Will sign off. Please call back as needed      Anticipated Disposition: TBD    Thank you for your consult. I will sign off. Please contact us if you have any additional questions.    Nya Durant DO  Critical Care Infectious Disease    Time: 35 minutes   50% of time spent on face-to-face counseling and coordination of care. Counseling included review of test results, diagnosis, and treatment plan with patient and/or family.        Subjective:     Principal Problem:LVAD (left ventricular assist device) present    HPI: 37-year-old male with history of NIDCM with BiV systolic heart failure on home milrinone presented 6/13/2022 for ADHF. Patient not current candidate for OHT as recently quit smoking 4/2022. IABP placed 6/13/2022, central line exchanged 6/20/2022. Blood culture x1 drawn on 6/20/2022, returned with GPC. Repeat blood cultures drawn 6/21/2022 x2. Patient reports tenderness around right femoral IABP - started once IABP was placed. Denied any worsening pain around right femoral line.    Called back 7/19 - LVAD placed 6/29, pt developed chills after unplugging his LVAD. Denies any abd pain. Family endorses some serosanguinous drainage from prior chest tube  site. No drainage from DLES per patient. Denies fevers, and states he feels fine today. Empirically started on vanc and cefepime.     Interval History: endorses feeling well today, no further chills, no complaints, leukocytosis resolved    Review of Systems   Constitutional:  Negative for activity change, appetite change, chills and fever.   HENT:  Negative for congestion, dental problem, ear pain and rhinorrhea.    Eyes:  Negative for pain and discharge.   Respiratory:  Negative for cough and shortness of breath.    Cardiovascular:  Negative for chest pain and leg swelling.   Gastrointestinal:  Negative for abdominal distention, abdominal pain, constipation, diarrhea, nausea and vomiting.   Genitourinary:  Negative for difficulty urinating and dysuria.   Musculoskeletal:  Negative for arthralgias, back pain and joint swelling.   Skin:  Negative for rash and wound.   Neurological:  Negative for dizziness, light-headedness and headaches.   Psychiatric/Behavioral:  Negative for behavioral problems and confusion.    Objective:     Vital Signs (Most Recent):  Temp: 98 °F (36.7 °C) (07/20/22 1152)  Pulse: 86 (07/20/22 1400)  Resp: 16 (07/20/22 1152)  BP: (!) 82/0 (07/20/22 1200)  SpO2: 99 % (07/20/22 1152)   Vital Signs (24h Range):  Temp:  [97.3 °F (36.3 °C)-98 °F (36.7 °C)] 98 °F (36.7 °C)  Pulse:  [76-98] 86  Resp:  [12-20] 16  SpO2:  [96 %-99 %] 99 %  BP: ()/(0-74) 82/0     Weight: 84.1 kg (185 lb 6.5 oz)  Body mass index is 23.17 kg/m².    Estimated Creatinine Clearance: 85.9 mL/min (based on SCr of 1.4 mg/dL).    Physical Exam  Constitutional:       General: He is not in acute distress.     Appearance: Normal appearance. He is well-developed. He is not ill-appearing or diaphoretic.   HENT:      Head: Normocephalic and atraumatic.      Right Ear: External ear normal.      Left Ear: External ear normal.      Nose: Nose normal.   Eyes:      General: No scleral icterus.        Right eye: No discharge.          Left eye: No discharge.      Extraocular Movements: Extraocular movements intact.      Conjunctiva/sclera: Conjunctivae normal.   Pulmonary:      Effort: Pulmonary effort is normal. No respiratory distress.      Breath sounds: No stridor.      Comments: Subxypoid chest tube site clean and dry, no drainage  DLES dressing in place  Abdominal:      General: Abdomen is flat. There is no distension.      Palpations: Abdomen is soft.      Tenderness: There is no abdominal tenderness.   Musculoskeletal:         General: No swelling.   Skin:     General: Skin is dry.      Coloration: Skin is not jaundiced or pale.      Findings: No erythema.   Neurological:      General: No focal deficit present.      Mental Status: He is alert and oriented to person, place, and time. Mental status is at baseline.   Psychiatric:         Mood and Affect: Mood normal.         Behavior: Behavior normal.         Thought Content: Thought content normal.         Judgment: Judgment normal.       Significant Labs: CBC:   Recent Labs   Lab 07/18/22 2021 07/19/22  0228 07/20/22  1215   WBC 20.78* 20.07* 12.44   HGB 7.3* 7.4* 7.8*   HCT 23.1* 23.7* 25.9*    396 459*     CMP:   Recent Labs   Lab 07/19/22 0228 07/19/22  1825 07/20/22  0358 07/20/22  1215   *  --  135* 136   K 3.6  3.6 3.1* 2.7*  2.7* 4.2   CL 96  --  96 99   CO2 22*  --  27 27   *  --  182* 115*   BUN 17  --  15 15   CREATININE 1.8*  --  1.3 1.4   CALCIUM 9.1  --  9.3 9.6   PROT 7.8  --  8.0  --    ALBUMIN 2.9*  --  2.9*  --    BILITOT 1.1*  --  0.9  --    ALKPHOS 159*  --  145*  --    AST 29  --  28  --    ALT 19  --  17  --    ANIONGAP 11  --  12 10   EGFRNONAA 47.0*  --  >60.0 >60.0     Microbiology Results (last 7 days)       Procedure Component Value Units Date/Time    Blood culture [[id number]] Collected: 07/19/22 0006    Order Status: Completed Specimen: Blood from Peripheral, Antecubital, Left Updated: 07/20/22 0613     Blood Culture, Routine No Growth to  date      No Growth to date            Significant Imaging: I have reviewed all pertinent imaging results/findings within the past 24 hours.

## 2022-07-20 NOTE — ASSESSMENT & PLAN NOTE
-No post ictal period  -Appreciate Neurology consult.  EEG ordered for 1 hours  -orthostatic BP negative  -getting device interrogation

## 2022-07-20 NOTE — SUBJECTIVE & OBJECTIVE
Past Medical History:   Diagnosis Date    Arthritis     Cardiomyopathy     CHF (congestive heart failure) 10/01/2020    Diabetes mellitus     Dilated cardiomyopathy 10/26/2020    Drug abuse 10/2020    Hyperosmolar hyperglycemic state (HHS) 5/25/2022    ICD (implantable cardioverter-defibrillator) in place 10/26/2020    Muscle cramping 6/15/2022    Renal disorder        Past Surgical History:   Procedure Laterality Date    APPLICATION OF WOUND VACUUM-ASSISTED CLOSURE DEVICE N/A 6/30/2022    Procedure: APPLICATION, WOUND VAC;  Surgeon: Luis F Paige MD;  Location: [facility] OR [address] [place] [address];  Service: Cardiovascular;  Laterality: N/A;  50 x 5 cm    CARDIAC DEFIBRILLATOR PLACEMENT      IMPLANTATION OF RIGHT VENTRICULAR ASSIST DEVICE (RVAD) N/A 6/29/2022    Procedure: INSERTION, RVAD;  Surgeon: Luis F Paige MD;  [facility];  Service: Cardiovascular;  Laterality: N/A;    INSERTION OF GRAFT TO PERICARDIUM Right 6/30/2022    Procedure: INSERTION-RIGHT VENTRICULAR ASSIST DEVICE;  Surgeon: Luis F Paige MD;  [address];  Service: Cardiovascular;  Laterality: Right;    IRRIGATION OF MEDIASTINUM  6/30/2022    Procedure: IRRIGATION, MEDIASTINUM;  Surgeon: Luis F Paige MD;  [facility];  Service: Cardiovascular;;    LEFT VENTRICULAR ASSIST DEVICE Left 6/23/2022    Procedure: INSERTION-LEFT VENTRICULAR ASSIST DEVICE;  Surgeon: Luis F Paige MD;  [facility];  Service: Cardiovascular;  Laterality: Left;    LEFT VENTRICULAR ASSIST DEVICE N/A 6/29/2022    Procedure: INSERTION-LEFT VENTRICULAR ASSIST DEVICE;  Surgeon: Luis F Paige MD;  [facility];  Service: Cardiovascular;  Laterality: N/A;    RIGHT HEART CATHETERIZATION Right 4/8/2022    Procedure: INSERTION, CATHETER, RIGHT HEART;  Surgeon: Luca Lopez Jr., MD;  Location: [facility] CATH LAB;  Service: Cardiology;  Laterality: Right;    RIGHT HEART CATHETERIZATION Right 4/19/2022    Procedure: INSERTION,  CATHETER, RIGHT HEART;  Surgeon: Josh Pulido MD;  Location: [facility] CATH LAB;  Service: Cardiology;  Laterality: Right;    STERNAL WOUND CLOSURE N/A 6/30/2022    Procedure: CLOSURE, WOUND, STERNUM;  Surgeon: Luis F Paige MD;  Location: [facility] OR [facility];  Service: Cardiovascular;  Laterality: N/A;       Review of patient's allergies indicates:   Allergen Reactions    Aspirin Other (See Comments)     Mr. Thacker is enrolled in Dr. Paige's Alon Trial and cannot have any aspirin and/or aspirin-containing products. DO NOT cancel any orders for INV Aspirin 100 mg/Placebo. If you have questions, please contact Isabel @ [phone], [phone],[email or web address], secure chat or MS Teams message.    Bumex [bumetanide] Hives    Lactose Diarrhea     Other reaction(s): Abdominal distension, gaseous    Torsemide Hives         No current facility-administered medications on file prior to encounter.     Current Outpatient Medications on File Prior to Encounter   Medication Sig    busPIRone (BUSPAR) 7.5 MG tablet Take 7.5 mg by mouth every 12 (twelve) hours.    EScitalopram oxalate (LEXAPRO) 5 MG Tab Take 1 tablet (5 mg total) by mouth once daily.    furosemide (LASIX) 80 MG tablet Take 80 mg by mouth 2 (two) times daily.    insulin aspart U-100 (NOVOLOG) 100 unit/mL (3 mL) InPn pen Inject 12 Units into the skin 3 (three) times daily.    insulin detemir U-100 (LEVEMIR FLEXTOUCH) 100 unit/mL (3 mL) SubQ InPn pen Inject 23 Units into the skin once daily.    mirtazapine (REMERON) 15 MG tablet Take 15 mg by mouth nightly.    pantoprazole (PROTONIX) 40 MG tablet Take 1 tablet (40 mg total) by mouth once daily.    potassium chloride SA (K-DUR,KLOR-CON) 20 MEQ tablet Take 2 tablets (40 mEq total) by mouth once daily.    sucralfate (CARAFATE) 1 gram tablet Take 1 g by mouth nightly.    albuterol (PROVENTIL/VENTOLIN HFA) 90 mcg/actuation inhaler INHALE 2 PUFFS BY MOUTH EVERY 4 HOURS AS NEEDED FOR COUGH OR WHEEZING    blood  "sugar diagnostic Strp Use to test blood glucose 4 (four) times daily.    blood-glucose meter Misc Use as instructed    lancets 33 gauge Misc Use to test blood glucose 4 (four) times daily.    milrinone (PRIMACOR) 1 mg/mL injection Inject into the vein.    milrinone 20mg/100ml D5W, 200mcg/ml, (PRIMACOR) 20 mg/100 mL (200 mcg/mL) infusion Inject 34.875 mcg/min into the vein continuous.    pen needle, diabetic 31 gauge x 1/4" Ndle 1 Device by Misc.(Non-Drug; Combo Route) route 4 (four) times daily.    promethazine (PHENERGAN) 12.5 MG Tab Take 1 tablet (12.5 mg total) by mouth every 6 (six) hours as needed (nausea).    traMADoL (ULTRAM) 50 mg tablet Take 1 tablet (50 mg total) by mouth every 6 (six) hours as needed for Pain.    [DISCONTINUED] digoxin (LANOXIN) 125 mcg tablet Take 1 tablet (0.125 mg total) by mouth once daily.    [DISCONTINUED] insulin (LANTUS SOLOSTAR U-100 INSULIN) glargine 100 units/mL (3mL) SubQ pen Inject 10 Units into the skin every evening. (Patient not taking: Reported on 2022)    [DISCONTINUED] metOLazone (ZAROXOLYN) 2.5 MG tablet Take 2.5 mg by mouth every other day.    [DISCONTINUED] metoprolol succinate (TOPROL-XL) 25 MG 24 hr tablet TAKE 1 TABLET(25 MG) BY MOUTH EVERY DAY    [DISCONTINUED] spironolactone (ALDACTONE) 25 MG tablet Take 1 tablet (25 mg total) by mouth once daily.     Family History       Problem Relation (Age of Onset)    Diverticulosis Brother    Heart attack Maternal Grandmother, Maternal Grandfather    Heart failure Father          Tobacco Use    Smoking status: Former Smoker     Packs/day: 0.50     Years: 16.00     Pack years: 8.00     Start date: 10/1/2004     Quit date: 2021     Years since quittin.2    Smokeless tobacco: Never Used   Substance and Sexual Activity    Alcohol use: Not Currently    Drug use: Not Currently     Types: Marijuana, MDMA (Ecstacy)    Sexual activity: Yes     Partners: Female     Birth control/protection: None     Review of Systems "   Constitutional:  Negative for chills, fatigue and fever.   HENT:  Negative for congestion, hearing loss, rhinorrhea, sore throat and tinnitus.    Eyes:  Negative for photophobia and visual disturbance.   Respiratory:  Negative for cough, chest tightness, shortness of breath and wheezing.    Cardiovascular:  Negative for chest pain, palpitations and leg swelling.   Gastrointestinal:  Negative for abdominal pain, blood in stool, diarrhea, nausea and vomiting.   Genitourinary:  Negative for dysuria, frequency, hematuria and urgency.   Musculoskeletal:  Negative for arthralgias, myalgias and neck stiffness.   Neurological:  Positive for dizziness. Negative for headaches.   Hematological:  Negative for adenopathy.   Psychiatric/Behavioral:  Negative for confusion.    Objective:     Vital Signs (Most Recent):  Temp: 97.7 °F (36.5 °C) (07/20/22 0806)  Pulse: 84 (07/20/22 0806)  Resp: 16 (07/20/22 0806)  BP: (!) 84/0 (07/20/22 0811)  SpO2: 98 % (07/20/22 0806)   Vital Signs (24h Range):  Temp:  [97.2 °F (36.2 °C)-97.8 °F (36.6 °C)] 97.7 °F (36.5 °C)  Pulse:  [76-98] 84  Resp:  [12-21] 16  SpO2:  [95 %-99 %] 98 %  BP: ()/(0-72) 84/0     Weight: 84.1 kg (185 lb 6.5 oz)  Body mass index is 23.17 kg/m².    Physical Exam  Constitutional:       General: He is not in acute distress.     Appearance: Normal appearance.   HENT:      Head: Normocephalic and atraumatic.   Eyes:      General:         Right eye: No discharge.         Left eye: No discharge.      Extraocular Movements: Extraocular movements intact and EOM normal.      Pupils: Pupils are equal, round, and reactive to light.   Cardiovascular:      Rate and Rhythm: Normal rate and regular rhythm.      Comments: LVAD auscultated.  Pulmonary:      Effort: Pulmonary effort is normal. No respiratory distress.   Abdominal:      General: There is no distension.      Palpations: Abdomen is soft.      Tenderness: There is no abdominal tenderness. There is no guarding.    Musculoskeletal:         General: No swelling or tenderness.      Cervical back: No rigidity or tenderness.   Skin:     General: Skin is warm and dry.   Neurological:      Mental Status: He is alert and oriented to person, place, and time.      Motor: Motor strength is normal.      Coordination: Finger-Nose-Finger Test and Heel to Shin Test normal.      Deep Tendon Reflexes:      Reflex Scores:       Bicep reflexes are 2+ on the right side and 2+ on the left side.       Brachioradialis reflexes are 2+ on the right side and 2+ on the left side.       Patellar reflexes are 2+ on the right side and 2+ on the left side.       Achilles reflexes are 2+ on the right side and 2+ on the left side.  Psychiatric:         Mood and Affect: Mood normal.         Speech: Speech normal.         Behavior: Behavior normal.         Thought Content: Thought content normal.       NEUROLOGICAL EXAMINATION:     MENTAL STATUS   Oriented to person, place, and time.   Attention: normal. Concentration: normal.   Speech: speech is normal   Level of consciousness: alertPraxis: normal     CRANIAL NERVES     CN III, IV, VI   Pupils are equal, round, and reactive to light.  Extraocular motions are normal.   Right pupil: Consensual response: intact. Accommodation: intact.   Left pupil: Consensual response: intact. Accommodation: intact.   CN III: no CN III palsy  CN VI: no CN VI palsy  Nystagmus: none   Diplopia: none    CN V   Facial sensation intact.     CN VII   Facial expression full, symmetric.     CN VIII   CN VIII normal.   Hearing: intact    CN IX, X   Palate: symmetric    CN XI   CN XI normal.     CN XII   Tongue: not atrophic  Fasciculations: absent  Tongue deviation: none    MOTOR EXAM   Overall muscle tone: normal  Right arm tone: normal  Left arm tone: normal  Right arm pronator drift: absent  Left arm pronator drift: absent  Right leg tone: normal  Left leg tone: normal    Strength   Strength 5/5 throughout.     REFLEXES     Reflexes    Right brachioradialis: 2+  Left brachioradialis: 2+  Right biceps: 2+  Left biceps: 2+  Right patellar: 2+  Left patellar: 2+  Right achilles: 2+  Left achilles: 2+  Right plantar: normal  Left plantar: normal  Right ankle clonus: absent  Left ankle clonus: absent    SENSORY EXAM   Light touch normal.   Right arm light touch: normal  Left arm light touch: normal  Right leg light touch: normal  Left leg light touch: normal  Vibration normal.   Right arm vibration: normal  Left arm vibration: normal  Right leg vibration: normal  Left leg vibration: normal    GAIT AND COORDINATION      Coordination   Finger to nose coordination: normal  Heel to shin coordination: normal    Tremor   Resting tremor: absent  Intention tremor: absent    Significant Labs: All pertinent lab results from the past 24 hours have been reviewed.    Significant Imaging: I have reviewed all pertinent imaging results/findings within the past 24 hours.

## 2022-07-20 NOTE — ASSESSMENT & PLAN NOTE
37M s/p LVAD on 6/29, inadvertantly unplugged LVAD yesterday after he got tangled in lines, then developed chills. No documented fever. Started on empiric vanc and cefepime. Blood cx pending. No drainage from DLES, surgical site healing welll. Leukocytosis is now resolved.     Recommendations:  - continue current vanc and cefepime for total of 5 days  - if ongoing symptoms, will evaluate further    Will sign off. Please call back as needed

## 2022-07-20 NOTE — PROGRESS NOTES
Update    Pt presents in room asleep and does not rouse. Pt's SO is present in room, aaox4, seated on couch. SO reports pt has had seizures which leave him weakened. SO reports medical team is working to determine cause and treatment. SO reports coping adequately, states she is trying to not become emotional so declines spiritual care consult at this time. SO has SW contact information should needs arise. Providing ongoing psychosocial and counseling support, education, resources, assistance and discharge planning as indicated. Following and available.

## 2022-07-20 NOTE — PROGRESS NOTES
07/20/22 1152 07/20/22 1155 07/20/22 1157   Vital Signs   Pulse 88 91 94   Heart Rate Source Monitor Monitor Monitor   Resp 16  --   --    SpO2 99 %  --   --    BP 93/67 92/74 116/65   MAP (mmHg) 76 79 93   BP Location Left arm Left arm Left arm   BP Method cNIBP cNIBP cNIBP   Patient Position Lying Sitting Standing   DEYA Martinez notified.

## 2022-07-20 NOTE — PT/OT/SLP PROGRESS
Occupational Therapy      Patient Name:  Kevan Queen   MRN:  [id number]    Patient not seen today secondary to Nurse/ ZOË hold d/t recent seizure activity (7/18, 7/19, 7/20). Will follow-up at a later date.    7/20/2022

## 2022-07-20 NOTE — PLAN OF CARE
Pt maintained free from falls/trauma/injuries and skin breakdown. Pt c/o moderate incisional chest pain and scheduled tylenol given. Dobu going at 2.5 mcg/kg/min. Lasix paused d/t low K+. IV abx given. Mag and potassium are been replaced. CVP of 11 and SvO2 of 49 obtained. Lab drawn. Plan of care reviewed. Pt verbalized understanding. All questions and concerns addressed. Will continue to monitor.

## 2022-07-20 NOTE — H&P (VIEW-ONLY)
[facility] - Cardiology Stepdown  Neurology  Consult Note    Patient Name: Kevan Queen  MRN: [id number]  Admission Date: 6/13/2022  Hospital Length of Stay: 37 days  Code Status: Full Code   Attending Provider: Josh Pulido MD   Consulting Provider: Jeff Soria DO  Primary Care Physician: ORALIA Cline  Principal Problem:LVAD (left ventricular assist device) present    Inpatient consult to Neurology  Consult performed by: Jeff Soria DO  Consult ordered by: Jeff Spencer PA-C         Subjective:     Chief Complaint:  Seizure-like activity     HPI:   37 year-old male with a history of NICM s/p LVAD placement on 6/29/22. He initially presented to ED 6/13/22 with chest pain, orthopnea, SOB, extremity and abdominal swelling and was admitted to heart transplant service for advanced heart failure options and subsequently underwent LVAD placement on 6/29/22. He was admitted to SICU, intubated and sedated, and returned to OR on 6/30 for chest closure. On 7/18, patient inadvertently removed his LVAD drive line, and experienced chills afterwards. Found to have an elevated white count 20.78 on 7/18, and was started on empiric antibiotics (vancomycin and cefepime). He was sent to the cardiac step down unit from the ICU on 7/19. General neurology was consulted for seizure-like episodes. The patient had one episode yesterday and one this morning. The patient says that he stood up yesterday, then sat down and immediately felt dizzy. He then began having convulsions of both his upper and lower extremities. This was witnessed by his wife. She states that he was unaware during the event, and the patient says that he could neither hear nor see during the episode. There was no associated tongue biting or other injury, or loss of control of bowel or bladder. This happened while sitting, and patient did not fall out of the bed. His wife says that he was leaning to the right side during the episode. There was no observed  eye deviation during the episode. Convulsions lasted approximately 1 minute. Patient denies any symptoms preceding the episode, such as visual/hearing changes, epigastric rising sensation. His wife says that he did not have confusion following the episode. This morning, the patient says that he stood up again, then sat down, and experienced a similar episode. The convulsions were again bilateral, without preceding symptoms or confusion following the event, and there was again no tongue biting/other associated injury, or urinary/bowel incontinence. The episode this morning lasted approximately 2 seconds. Patient denies any prior seizure-like episodes, history of AED treatment, family history of seizure; denies history of cancer, congential neurological/cardiac defects. He does have a history of substance abuse, and endorses prior MDMA and marijuana use, but states that he has not used MDMA in years, and not used marijuana for 1 year. Denies any drug use while in the hospital. He also denies any alcohol use both during this hospital stay and before. Denies fevers, chills, neck stiffness/pain, vision change, hearing change, weakness, pre-syncope, syncope, chest pain, shortness of breath, nausea, vomiting, diarrhea, dysuria, hematuria. States that he is feeling well today, and has been well over the last two days, aside from the convulsive episodes.              Past Medical History:   Diagnosis Date    Arthritis     Cardiomyopathy     CHF (congestive heart failure) 10/01/2020    Diabetes mellitus     Dilated cardiomyopathy 10/26/2020    Drug abuse 10/2020    Hyperosmolar hyperglycemic state (HHS) 5/25/2022    ICD (implantable cardioverter-defibrillator) in place 10/26/2020    Muscle cramping 6/15/2022    Renal disorder        Past Surgical History:   Procedure Laterality Date    APPLICATION OF WOUND VACUUM-ASSISTED CLOSURE DEVICE N/A 6/30/2022    Procedure: APPLICATION, WOUND VAC;  Surgeon: Luis F Paige  MD;  Location: [facility] OR [facility] FLR;  Service: Cardiovascular;  Laterality: N/A;  50 x 5 cm    CARDIAC DEFIBRILLATOR PLACEMENT      IMPLANTATION OF RIGHT VENTRICULAR ASSIST DEVICE (RVAD) N/A 6/29/2022    Procedure: INSERTION, RVAD;  Surgeon: Luis F Paige MD;  Location: [facility] OR [facility] FLR;  Service: Cardiovascular;  Laterality: N/A;    INSERTION OF GRAFT TO PERICARDIUM Right 6/30/2022    Procedure: INSERTION-RIGHT VENTRICULAR ASSIST DEVICE;  Surgeon: Luis F Paige MD;  [facility];  Service: Cardiovascular;  Laterality: Right;    IRRIGATION OF MEDIASTINUM  6/30/2022    Procedure: IRRIGATION, MEDIASTINUM;  Surgeon: Luis F Paige MD;  Location: [facility] OR [address] [place] [address];  Service: Cardiovascular;;    LEFT VENTRICULAR ASSIST DEVICE Left 6/23/2022    Procedure: INSERTION-LEFT VENTRICULAR ASSIST DEVICE;  Surgeon: Luis F Paige MD;  [facility];  Service: Cardiovascular;  Laterality: Left;    LEFT VENTRICULAR ASSIST DEVICE N/A 6/29/2022    Procedure: INSERTION-LEFT VENTRICULAR ASSIST DEVICE;  Surgeon: Luis F Paige MD;  [facility];  Service: Cardiovascular;  Laterality: N/A;    RIGHT HEART CATHETERIZATION Right 4/8/2022    Procedure: INSERTION, CATHETER, RIGHT HEART;  Surgeon: Luca Lopez Jr., MD;  Location: [facility] CATH LAB;  Service: Cardiology;  Laterality: Right;    RIGHT HEART CATHETERIZATION Right 4/19/2022    Procedure: INSERTION, CATHETER, RIGHT HEART;  Surgeon: Josh Pulido MD;  Location: [facility] CATH LAB;  Service: Cardiology;  Laterality: Right;    STERNAL WOUND CLOSURE N/A 6/30/2022    Procedure: CLOSURE, WOUND, STERNUM;  Surgeon: Luis F Paige MD;  Location: [facility] OR [facility]LR;  Service: Cardiovascular;  Laterality: N/A;       Review of patient's allergies indicates:   Allergen Reactions    Aspirin Other (See Comments)     Mr. Thacker is enrolled in Dr. Paige's Alon Trial and cannot have any aspirin and/or aspirin-containing products. DO NOT cancel any  "orders for INV Aspirin 100 mg/Placebo. If you have questions, please contact Isabel @ [phone], [phone],[email or web address] secure chat or MS Teams message.    Bumex [bumetanide] Hives    Lactose Diarrhea     Other reaction(s): Abdominal distension, gaseous    Torsemide Hives         No current facility-administered medications on file prior to encounter.     Current Outpatient Medications on File Prior to Encounter   Medication Sig    busPIRone (BUSPAR) 7.5 MG tablet Take 7.5 mg by mouth every 12 (twelve) hours.    EScitalopram oxalate (LEXAPRO) 5 MG Tab Take 1 tablet (5 mg total) by mouth once daily.    furosemide (LASIX) 80 MG tablet Take 80 mg by mouth 2 (two) times daily.    insulin aspart U-100 (NOVOLOG) 100 unit/mL (3 mL) InPn pen Inject 12 Units into the skin 3 (three) times daily.    insulin detemir U-100 (LEVEMIR FLEXTOUCH) 100 unit/mL (3 mL) SubQ InPn pen Inject 23 Units into the skin once daily.    mirtazapine (REMERON) 15 MG tablet Take 15 mg by mouth nightly.    pantoprazole (PROTONIX) 40 MG tablet Take 1 tablet (40 mg total) by mouth once daily.    potassium chloride SA (K-DUR,KLOR-CON) 20 MEQ tablet Take 2 tablets (40 mEq total) by mouth once daily.    sucralfate (CARAFATE) 1 gram tablet Take 1 g by mouth nightly.    albuterol (PROVENTIL/VENTOLIN HFA) 90 mcg/actuation inhaler INHALE 2 PUFFS BY MOUTH EVERY 4 HOURS AS NEEDED FOR COUGH OR WHEEZING    blood sugar diagnostic Strp Use to test blood glucose 4 (four) times daily.    blood-glucose meter Misc Use as instructed    lancets 33 gauge Misc Use to test blood glucose 4 (four) times daily.    milrinone (PRIMACOR) 1 mg/mL injection Inject into the vein.    milrinone 20mg/100ml D5W, 200mcg/ml, (PRIMACOR) 20 mg/100 mL (200 mcg/mL) infusion Inject 34.875 mcg/min into the vein continuous.    pen needle, diabetic 31 gauge x 1/4" Ndle 1 Device by Misc.(Non-Drug; Combo Route) route 4 (four) times daily.    promethazine (PHENERGAN) " 12.5 MG Tab Take 1 tablet (12.5 mg total) by mouth every 6 (six) hours as needed (nausea).    traMADoL (ULTRAM) 50 mg tablet Take 1 tablet (50 mg total) by mouth every 6 (six) hours as needed for Pain.    [DISCONTINUED] digoxin (LANOXIN) 125 mcg tablet Take 1 tablet (0.125 mg total) by mouth once daily.    [DISCONTINUED] insulin (LANTUS SOLOSTAR U-100 INSULIN) glargine 100 units/mL (3mL) SubQ pen Inject 10 Units into the skin every evening. (Patient not taking: Reported on 2022)    [DISCONTINUED] metOLazone (ZAROXOLYN) 2.5 MG tablet Take 2.5 mg by mouth every other day.    [DISCONTINUED] metoprolol succinate (TOPROL-XL) 25 MG 24 hr tablet TAKE 1 TABLET(25 MG) BY MOUTH EVERY DAY    [DISCONTINUED] spironolactone (ALDACTONE) 25 MG tablet Take 1 tablet (25 mg total) by mouth once daily.     Family History       Problem Relation (Age of Onset)    Diverticulosis Brother    Heart attack Maternal Grandmother, Maternal Grandfather    Heart failure Father          Tobacco Use    Smoking status: Former Smoker     Packs/day: 0.50     Years: 16.00     Pack years: 8.00     Start date: 10/1/2004     Quit date: 2021     Years since quittin.2    Smokeless tobacco: Never Used   Substance and Sexual Activity    Alcohol use: Not Currently    Drug use: Not Currently     Types: Marijuana, MDMA (Ecstacy)    Sexual activity: Yes     Partners: Female     Birth control/protection: None     Review of Systems   Constitutional:  Negative for chills, fatigue and fever.   HENT:  Negative for congestion, hearing loss, rhinorrhea, sore throat and tinnitus.    Eyes:  Negative for photophobia and visual disturbance.   Respiratory:  Negative for cough, chest tightness, shortness of breath and wheezing.    Cardiovascular:  Negative for chest pain, palpitations and leg swelling.   Gastrointestinal:  Negative for abdominal pain, blood in stool, diarrhea, nausea and vomiting.   Genitourinary:  Negative for dysuria, frequency,  hematuria and urgency.   Musculoskeletal:  Negative for arthralgias, myalgias and neck stiffness.   Neurological:  Positive for dizziness. Negative for headaches.   Hematological:  Negative for adenopathy.   Psychiatric/Behavioral:  Negative for confusion.    Objective:     Vital Signs (Most Recent):  Temp: 97.7 °F (36.5 °C) (07/20/22 0806)  Pulse: 84 (07/20/22 0806)  Resp: 16 (07/20/22 0806)  BP: (!) 84/0 (07/20/22 0811)  SpO2: 98 % (07/20/22 0806)   Vital Signs (24h Range):  Temp:  [97.2 °F (36.2 °C)-97.8 °F (36.6 °C)] 97.7 °F (36.5 °C)  Pulse:  [76-98] 84  Resp:  [12-21] 16  SpO2:  [95 %-99 %] 98 %  BP: ()/(0-72) 84/0     Weight: 84.1 kg (185 lb 6.5 oz)  Body mass index is 23.17 kg/m².    Physical Exam  Constitutional:       General: He is not in acute distress.     Appearance: Normal appearance.   HENT:      Head: Normocephalic and atraumatic.   Eyes:      General:         Right eye: No discharge.         Left eye: No discharge.      Extraocular Movements: Extraocular movements intact and EOM normal.      Pupils: Pupils are equal, round, and reactive to light.   Cardiovascular:      Rate and Rhythm: Normal rate and regular rhythm.      Comments: LVAD auscultated.  Pulmonary:      Effort: Pulmonary effort is normal. No respiratory distress.   Abdominal:      General: There is no distension.      Palpations: Abdomen is soft.      Tenderness: There is no abdominal tenderness. There is no guarding.   Musculoskeletal:         General: No swelling or tenderness.      Cervical back: No rigidity or tenderness.   Skin:     General: Skin is warm and dry.   Neurological:      Mental Status: He is alert and oriented to person, place, and time.      Motor: Motor strength is normal.      Coordination: Finger-Nose-Finger Test and Heel to Shin Test normal.      Deep Tendon Reflexes:      Reflex Scores:       Bicep reflexes are 2+ on the right side and 2+ on the left side.       Brachioradialis reflexes are 2+ on the right  side and 2+ on the left side.       Patellar reflexes are 2+ on the right side and 2+ on the left side.       Achilles reflexes are 2+ on the right side and 2+ on the left side.  Psychiatric:         Mood and Affect: Mood normal.         Speech: Speech normal.         Behavior: Behavior normal.         Thought Content: Thought content normal.       NEUROLOGICAL EXAMINATION:     MENTAL STATUS   Oriented to person, place, and time.   Attention: normal. Concentration: normal.   Speech: speech is normal   Level of consciousness: alertPraxis: normal     CRANIAL NERVES     CN III, IV, VI   Pupils are equal, round, and reactive to light.  Extraocular motions are normal.   Right pupil: Consensual response: intact. Accommodation: intact.   Left pupil: Consensual response: intact. Accommodation: intact.   CN III: no CN III palsy  CN VI: no CN VI palsy  Nystagmus: none   Diplopia: none    CN V   Facial sensation intact.     CN VII   Facial expression full, symmetric.     CN VIII   CN VIII normal.   Hearing: intact    CN IX, X   Palate: symmetric    CN XI   CN XI normal.     CN XII   Tongue: not atrophic  Fasciculations: absent  Tongue deviation: none    MOTOR EXAM   Overall muscle tone: normal  Right arm tone: normal  Left arm tone: normal  Right arm pronator drift: absent  Left arm pronator drift: absent  Right leg tone: normal  Left leg tone: normal    Strength   Strength 5/5 throughout.     REFLEXES     Reflexes   Right brachioradialis: 2+  Left brachioradialis: 2+  Right biceps: 2+  Left biceps: 2+  Right patellar: 2+  Left patellar: 2+  Right achilles: 2+  Left achilles: 2+  Right plantar: normal  Left plantar: normal  Right ankle clonus: absent  Left ankle clonus: absent    SENSORY EXAM   Light touch normal.   Right arm light touch: normal  Left arm light touch: normal  Right leg light touch: normal  Left leg light touch: normal  Vibration normal.   Right arm vibration: normal  Left arm vibration: normal  Right leg  vibration: normal  Left leg vibration: normal    GAIT AND COORDINATION      Coordination   Finger to nose coordination: normal  Heel to shin coordination: normal    Tremor   Resting tremor: absent  Intention tremor: absent    Significant Labs: All pertinent lab results from the past 24 hours have been reviewed.    Significant Imaging: I have reviewed all pertinent imaging results/findings within the past 24 hours.    Assessment and Plan:     Seizure-like activity    37 year-old male who had LVAD placed on 6/29, and was sent from ICU to cardiac step-down unit yesterday. He had two episodes of seizure-like activity, one yesterday, and one this morning. Both involve generalized convulsions and patient is unaware during these episodes. There is no associated tongue biting, eye deviation, loss of bowel or bladder control with either episode. There is no confusion after the episodes, and no preceding symptoms. He leans to the right during these, but there is no observed eye deviation. His serum chemistries have shown hyponatremia, hypokalemia, and hypomagnesemia that have coincided with the seizure-like events. These are currently being corrected by the primary team. WBC abruptly increased from 7/17 to 7/18 (8 to 20.07). He was complaining of chills on 7/18, following inadvertently removing the LVAD drive line. He was started on empiric vancomycin and cefepime. CT non-contrast of head on 7/19 showed no acute abnormality: there is a stable focus of hypo-attenuation along the left parasaggital occipital lobe.    Differential includes seizure, convulsive syncope, or orthostatic hypotensive event. Electrolyte abnormalities may also be contributing seizure-like activity. Cefepime may also be lowering the seizure threshold, contributing to the seizure-like events. Recommend switching from cefepime to other antibiotic, and EEG monitoring >1 hr at this time. MRI contraindicated due to LVAD. Will follow peripherally for EEG  results.    Recommendations  -Please switch from cefepime to another antibiotic due to cefepime's propensity to lower seizure threshold  -EEG monitoring >1 hr  -no MRI at this time due to LVAD  -No AED recommendations at this time due to uncertain etiology of event. Neurology will sign off at this time and follow up EEG result peripherally, and will message primary team with any additional recommendations following EEG read.  -outpatient follow up with epilepsy      VTE Risk Mitigation (From admission, onward)         Ordered     IP VTE HIGH RISK PATIENT  Once         06/30/22 1821     Place sequential compression device  Until discontinued         06/29/22 1548                Thank you for your consult. Neurology will sign off at this time.    Jeff Soria, DO  Neurology  [facility] - Cardiology Stepdown

## 2022-07-20 NOTE — PROGRESS NOTES
Pharmacokinetic Assessment Follow Up: IV Vancomycin    Vancomycin serum concentration assessment(s):  Vancomycin random level resulted today at 15.3. Renal function improved with SCr down to 1.3.   Plan to resume scheduled vancomycin 1.25gm iv Q 12 hours.   Follow up trough ordered prior to fourth dose on 7.22 @ 330.     Yany Perkins, PharmD, [facility]  Heart Transplant Clinical Specialist   Spectralink: [phone]    Drug levels (last 3 results):  Recent Labs   Lab Result Units 07/20/22  0358   Vancomycin, Random ug/mL 15.3        Patient brief summary:  Kevan Queen is a 37 y.o. male initiated on antimicrobial therapy with IV Vancomycin for treatment of sepsis    Drug Allergies:   Review of patient's allergies indicates:   Allergen Reactions    Aspirin Other (See Comments)     Mr. Thacker is enrolled in Dr. Paige's Alon Trial and cannot have any aspirin and/or aspirin-containing products. DO NOT cancel any orders for INV Aspirin 100 mg/Placebo. If you have questions, please contact Isabel @ [phone], [phone],[email or web address], secure chat or MS Teams message.    Bumex [bumetanide] Hives    Lactose Diarrhea     Other reaction(s): Abdominal distension, gaseous    Torsemide Hives       Actual Body Weight:   84.1kg    Renal Function:   Estimated Creatinine Clearance: 92.5 mL/min (based on SCr of 1.3 mg/dL).,     Dialysis Method (if applicable):  N/A    CBC (last 72 hours):  Recent Labs   Lab Result Units 07/18/22  0311 07/18/22  1708 07/18/22  2021 07/19/22  0228   WBC K/uL 8.22 9.34 20.78* 20.07*   Hemoglobin g/dL 7.5* 7.4* 7.3* 7.4*   Hematocrit % 24.5* 24.5* 23.1* 23.7*   Platelets K/uL 461* 455* 390 396   Gran % % 72.7 80.6* 94.9* 90.5*   Lymph % % 18.4 11.6* 2.8* 3.6*   Mono % % 6.9 6.3 1.7* 5.2   Eosinophil % % 1.3 0.9 0.1 0.0   Basophil % % 0.5 0.3 0.1 0.2   Differential Method  Automated Automated Automated Automated       Metabolic Panel (last 72 hours):  Recent Labs   Lab Result Units 07/17/22  1646  07/18/22  0115 07/18/22  0311 07/18/22  1708 07/19/22  0228 07/19/22  1825 07/20/22  0358   Sodium mmol/L  --  134* 132* 130* 129*  --  135*   Potassium mmol/L 4.1 3.1* 3.1* 3.5  3.5 3.6  3.6 3.1* 2.7*  2.7*   Chloride mmol/L  --  98 96 96 96  --  96   CO2 mmol/L  --  26 26 24 22*  --  27   Glucose mg/dL  --  190* 246* 159* 209*  --  182*   BUN mg/dL  --  20 19 17 17  --  15   Creatinine mg/dL  --  1.5* 1.5* 1.5* 1.8*  --  1.3   Albumin g/dL  --   --  2.8*  --  2.9*  --  2.9*   Total Bilirubin mg/dL  --   --  0.9  --  1.1*  --  0.9   Alkaline Phosphatase U/L  --   --  156*  --  159*  --  145*   AST U/L  --   --  34  --  29  --  28   ALT U/L  --   --  22  --  19  --  17   Magnesium mg/dL 2.0 1.9  --  2.2 1.5* 2.2 1.8   Phosphorus mg/dL  --  3.8  --   --   --   --   --        Vancomycin Administrations:  vancomycin given in the last 96 hours                   vancomycin 1.25 g in dextrose 5% 250 mL IVPB (ready to mix) (mg) 1,250 mg New Bag 07/19/22 1252    vancomycin (VANCOCIN) 2,250 mg in dextrose 5 % 500 mL IVPB (mg) 2,250 mg New Bag 07/19/22 0053                Microbiologic Results:  Microbiology Results (last 7 days)     Procedure Component Value Units Date/Time    Blood culture [[id number]] Collected: 07/19/22 0006    Order Status: Completed Specimen: Blood from Peripheral, Antecubital, Left Updated: 07/20/22 0613     Blood Culture, Routine No Growth to date      No Growth to date

## 2022-07-20 NOTE — SUBJECTIVE & OBJECTIVE
Interval History: endorses feeling well today, no further chills, no complaints, leukocytosis resolved    Review of Systems   Constitutional:  Negative for activity change, appetite change, chills and fever.   HENT:  Negative for congestion, dental problem, ear pain and rhinorrhea.    Eyes:  Negative for pain and discharge.   Respiratory:  Negative for cough and shortness of breath.    Cardiovascular:  Negative for chest pain and leg swelling.   Gastrointestinal:  Negative for abdominal distention, abdominal pain, constipation, diarrhea, nausea and vomiting.   Genitourinary:  Negative for difficulty urinating and dysuria.   Musculoskeletal:  Negative for arthralgias, back pain and joint swelling.   Skin:  Negative for rash and wound.   Neurological:  Negative for dizziness, light-headedness and headaches.   Psychiatric/Behavioral:  Negative for behavioral problems and confusion.    Objective:     Vital Signs (Most Recent):  Temp: 98 °F (36.7 °C) (07/20/22 1152)  Pulse: 86 (07/20/22 1400)  Resp: 16 (07/20/22 1152)  BP: (!) 82/0 (07/20/22 1200)  SpO2: 99 % (07/20/22 1152)   Vital Signs (24h Range):  Temp:  [97.3 °F (36.3 °C)-98 °F (36.7 °C)] 98 °F (36.7 °C)  Pulse:  [76-98] 86  Resp:  [12-20] 16  SpO2:  [96 %-99 %] 99 %  BP: ()/(0-74) 82/0     Weight: 84.1 kg (185 lb 6.5 oz)  Body mass index is 23.17 kg/m².    Estimated Creatinine Clearance: 85.9 mL/min (based on SCr of 1.4 mg/dL).    Physical Exam  Constitutional:       General: He is not in acute distress.     Appearance: Normal appearance. He is well-developed. He is not ill-appearing or diaphoretic.   HENT:      Head: Normocephalic and atraumatic.      Right Ear: External ear normal.      Left Ear: External ear normal.      Nose: Nose normal.   Eyes:      General: No scleral icterus.        Right eye: No discharge.         Left eye: No discharge.      Extraocular Movements: Extraocular movements intact.      Conjunctiva/sclera: Conjunctivae normal.    Pulmonary:      Effort: Pulmonary effort is normal. No respiratory distress.      Breath sounds: No stridor.      Comments: Subxypoid chest tube site clean and dry, no drainage  DLES dressing in place  Abdominal:      General: Abdomen is flat. There is no distension.      Palpations: Abdomen is soft.      Tenderness: There is no abdominal tenderness.   Musculoskeletal:         General: No swelling.   Skin:     General: Skin is dry.      Coloration: Skin is not jaundiced or pale.      Findings: No erythema.   Neurological:      General: No focal deficit present.      Mental Status: He is alert and oriented to person, place, and time. Mental status is at baseline.   Psychiatric:         Mood and Affect: Mood normal.         Behavior: Behavior normal.         Thought Content: Thought content normal.         Judgment: Judgment normal.       Significant Labs: CBC:   Recent Labs   Lab 07/18/22 2021 07/19/22  0228 07/20/22  1215   WBC 20.78* 20.07* 12.44   HGB 7.3* 7.4* 7.8*   HCT 23.1* 23.7* 25.9*    396 459*     CMP:   Recent Labs   Lab 07/19/22 0228 07/19/22  1825 07/20/22  0358 07/20/22  1215   *  --  135* 136   K 3.6  3.6 3.1* 2.7*  2.7* 4.2   CL 96  --  96 99   CO2 22*  --  27 27   *  --  182* 115*   BUN 17  --  15 15   CREATININE 1.8*  --  1.3 1.4   CALCIUM 9.1  --  9.3 9.6   PROT 7.8  --  8.0  --    ALBUMIN 2.9*  --  2.9*  --    BILITOT 1.1*  --  0.9  --    ALKPHOS 159*  --  145*  --    AST 29  --  28  --    ALT 19  --  17  --    ANIONGAP 11  --  12 10   EGFRNONAA 47.0*  --  >60.0 >60.0     Microbiology Results (last 7 days)       Procedure Component Value Units Date/Time    Blood culture [[id number]] Collected: 07/19/22 0006    Order Status: Completed Specimen: Blood from Peripheral, Antecubital, Left Updated: 07/20/22 0613     Blood Culture, Routine No Growth to date      No Growth to date            Significant Imaging: I have reviewed all pertinent imaging results/findings within the  past 24 hours.

## 2022-07-20 NOTE — ASSESSMENT & PLAN NOTE
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  - HTS primary   - GASTON trial   - INR was supratherapeuitc and coumadin was held. Goal 2.0-3.0 will restart coumadin today  - LDH stable  - CVP 11. Cont. IV Lasix gtt to 30 mg/hr.   - ECHO 7/19 with speed at 5100- EF: 10%, LVEDD: 6mc (up from 4.24 with speed at 5200)      Procedure: Device Interrogation Including analysis of device parameters  Current Settings: Ventricular Assist Device  Review of device function is stable    TXP LVAD INTERROGATIONS 7/20/2022 7/20/2022 7/20/2022 7/20/2022 7/19/2022 7/19/2022 7/19/2022   Type HeartMate3 HeartMate3 HeartMate3 HeartMate3 HeartMate3 HeartMate3 HeartMate3   Flow 4.2 4.0 4.3 4 4.5 4.0 4.4   Speed 5100 5100 5100 5100 5100 5100 5100   PI 4.4 4.8 3.3 3.9 3.4 4.6 3.5   Power (Serna) 3.6 3.6 3.6 3.6 3.7 3.6 3.6   LSL 4700 4700 4700 4700 4700 - 4800   Pulsatility Intermittent pulse Intermittent pulse - - - - Intermittent pulse

## 2022-07-20 NOTE — CONSULTS
[facility] - Cardiology Stepdown  Neurology  Consult Note    Patient Name: Kevan Queen  MRN: [id number]  Admission Date: 6/13/2022  Hospital Length of Stay: 37 days  Code Status: Full Code   Attending Provider: Josh Pulido MD   Consulting Provider: Jeff Soria DO  Primary Care Physician: ORALIA Cline  Principal Problem:LVAD (left ventricular assist device) present    Inpatient consult to Neurology  Consult performed by: Jeff Soria DO  Consult ordered by: Jeff Spencer PA-C         Subjective:     Chief Complaint:  Seizure-like activity     HPI:   37 year-old male with a history of NICM s/p LVAD placement on 6/29/22. He initially presented to ED 6/13/22 with chest pain, orthopnea, SOB, extremity and abdominal swelling and was admitted to heart transplant service for advanced heart failure options and subsequently underwent LVAD placement on 6/29/22. He was admitted to SICU, intubated and sedated, and returned to OR on 6/30 for chest closure. On 7/18, patient inadvertently removed his LVAD drive line, and experienced chills afterwards. Found to have an elevated white count 20.78 on 7/18, and was started on empiric antibiotics (vancomycin and cefepime). He was sent to the cardiac step down unit from the ICU on 7/19. General neurology was consulted for seizure-like episodes. The patient had one episode yesterday and one this morning. The patient says that he stood up yesterday, then sat down and immediately felt dizzy. He then began having convulsions of both his upper and lower extremities. This was witnessed by his wife. She states that he was unaware during the event, and the patient says that he could neither hear nor see during the episode. There was no associated tongue biting or other injury, or loss of control of bowel or bladder. This happened while sitting, and patient did not fall out of the bed. His wife says that he was leaning to the right side during the episode. There was no observed  eye deviation during the episode. Convulsions lasted approximately 1 minute. Patient denies any symptoms preceding the episode, such as visual/hearing changes, epigastric rising sensation. His wife says that he did not have confusion following the episode. This morning, the patient says that he stood up again, then sat down, and experienced a similar episode. The convulsions were again bilateral, without preceding symptoms or confusion following the event, and there was again no tongue biting/other associated injury, or urinary/bowel incontinence. The episode this morning lasted approximately 2 seconds. Patient denies any prior seizure-like episodes, history of AED treatment, family history of seizure; denies history of cancer, congential neurological/cardiac defects. He does have a history of substance abuse, and endorses prior MDMA and marijuana use, but states that he has not used MDMA in years, and not used marijuana for 1 year. Denies any drug use while in the hospital. He also denies any alcohol use both during this hospital stay and before. Denies fevers, chills, neck stiffness/pain, vision change, hearing change, weakness, pre-syncope, syncope, chest pain, shortness of breath, nausea, vomiting, diarrhea, dysuria, hematuria. States that he is feeling well today, and has been well over the last two days, aside from the convulsive episodes.              Past Medical History:   Diagnosis Date    Arthritis     Cardiomyopathy     CHF (congestive heart failure) 10/01/2020    Diabetes mellitus     Dilated cardiomyopathy 10/26/2020    Drug abuse 10/2020    Hyperosmolar hyperglycemic state (HHS) 5/25/2022    ICD (implantable cardioverter-defibrillator) in place 10/26/2020    Muscle cramping 6/15/2022    Renal disorder        Past Surgical History:   Procedure Laterality Date    APPLICATION OF WOUND VACUUM-ASSISTED CLOSURE DEVICE N/A 6/30/2022    Procedure: APPLICATION, WOUND VAC;  Surgeon: Luis F Paige  MD;  Location: [facility] OR [facility] FLR;  Service: Cardiovascular;  Laterality: N/A;  50 x 5 cm    CARDIAC DEFIBRILLATOR PLACEMENT      IMPLANTATION OF RIGHT VENTRICULAR ASSIST DEVICE (RVAD) N/A 6/29/2022    Procedure: INSERTION, RVAD;  Surgeon: Luis F Paige MD;  Location: [facility] OR [facility] FLR;  Service: Cardiovascular;  Laterality: N/A;    INSERTION OF GRAFT TO PERICARDIUM Right 6/30/2022    Procedure: INSERTION-RIGHT VENTRICULAR ASSIST DEVICE;  Surgeon: Luis F Paige MD;  [facility];  Service: Cardiovascular;  Laterality: Right;    IRRIGATION OF MEDIASTINUM  6/30/2022    Procedure: IRRIGATION, MEDIASTINUM;  Surgeon: Luis F Paige MD;  Location: [facility] OR [address] [place] [address];  Service: Cardiovascular;;    LEFT VENTRICULAR ASSIST DEVICE Left 6/23/2022    Procedure: INSERTION-LEFT VENTRICULAR ASSIST DEVICE;  Surgeon: Luis F Paige MD;  [facility];  Service: Cardiovascular;  Laterality: Left;    LEFT VENTRICULAR ASSIST DEVICE N/A 6/29/2022    Procedure: INSERTION-LEFT VENTRICULAR ASSIST DEVICE;  Surgeon: Luis F Paige MD;  [facility];  Service: Cardiovascular;  Laterality: N/A;    RIGHT HEART CATHETERIZATION Right 4/8/2022    Procedure: INSERTION, CATHETER, RIGHT HEART;  Surgeon: Luca Lopez Jr., MD;  Location: [facility] CATH LAB;  Service: Cardiology;  Laterality: Right;    RIGHT HEART CATHETERIZATION Right 4/19/2022    Procedure: INSERTION, CATHETER, RIGHT HEART;  Surgeon: Josh Pulido MD;  Location: [facility] CATH LAB;  Service: Cardiology;  Laterality: Right;    STERNAL WOUND CLOSURE N/A 6/30/2022    Procedure: CLOSURE, WOUND, STERNUM;  Surgeon: Luis F Paige MD;  Location: [facility] OR [facility] FLR;  Service: Cardiovascular;  Laterality: N/A;       Review of patient's allergies indicates:   Allergen Reactions    Aspirin Other (See Comments)     Mr. Thacker is enrolled in Dr. Paige's Alon Trial and cannot have any aspirin and/or aspirin-containing products. DO NOT cancel any  "orders for INV Aspirin 100 mg/Placebo. If you have questions, please contact Isabel @ [phone], [phone], secure chat or MS Teams message.    Bumex [bumetanide] Hives    Lactose Diarrhea     Other reaction(s): Abdominal distension, gaseous    Torsemide Hives         No current facility-administered medications on file prior to encounter.     Current Outpatient Medications on File Prior to Encounter   Medication Sig    busPIRone (BUSPAR) 7.5 MG tablet Take 7.5 mg by mouth every 12 (twelve) hours.    EScitalopram oxalate (LEXAPRO) 5 MG Tab Take 1 tablet (5 mg total) by mouth once daily.    furosemide (LASIX) 80 MG tablet Take 80 mg by mouth 2 (two) times daily.    insulin aspart U-100 (NOVOLOG) 100 unit/mL (3 mL) InPn pen Inject 12 Units into the skin 3 (three) times daily.    insulin detemir U-100 (LEVEMIR FLEXTOUCH) 100 unit/mL (3 mL) SubQ InPn pen Inject 23 Units into the skin once daily.    mirtazapine (REMERON) 15 MG tablet Take 15 mg by mouth nightly.    pantoprazole (PROTONIX) 40 MG tablet Take 1 tablet (40 mg total) by mouth once daily.    potassium chloride SA (K-DUR,KLOR-CON) 20 MEQ tablet Take 2 tablets (40 mEq total) by mouth once daily.    sucralfate (CARAFATE) 1 gram tablet Take 1 g by mouth nightly.    albuterol (PROVENTIL/VENTOLIN HFA) 90 mcg/actuation inhaler INHALE 2 PUFFS BY MOUTH EVERY 4 HOURS AS NEEDED FOR COUGH OR WHEEZING    blood sugar diagnostic Strp Use to test blood glucose 4 (four) times daily.    blood-glucose meter Misc Use as instructed    lancets 33 gauge Misc Use to test blood glucose 4 (four) times daily.    milrinone (PRIMACOR) 1 mg/mL injection Inject into the vein.    milrinone 20mg/100ml D5W, 200mcg/ml, (PRIMACOR) 20 mg/100 mL (200 mcg/mL) infusion Inject 34.875 mcg/min into the vein continuous.    pen needle, diabetic 31 gauge x 1/4" Ndle 1 Device by Misc.(Non-Drug; Combo Route) route 4 (four) times daily.    promethazine (PHENERGAN) " 12.5 MG Tab Take 1 tablet (12.5 mg total) by mouth every 6 (six) hours as needed (nausea).    traMADoL (ULTRAM) 50 mg tablet Take 1 tablet (50 mg total) by mouth every 6 (six) hours as needed for Pain.    [DISCONTINUED] digoxin (LANOXIN) 125 mcg tablet Take 1 tablet (0.125 mg total) by mouth once daily.    [DISCONTINUED] insulin (LANTUS SOLOSTAR U-100 INSULIN) glargine 100 units/mL (3mL) SubQ pen Inject 10 Units into the skin every evening. (Patient not taking: Reported on 2022)    [DISCONTINUED] metOLazone (ZAROXOLYN) 2.5 MG tablet Take 2.5 mg by mouth every other day.    [DISCONTINUED] metoprolol succinate (TOPROL-XL) 25 MG 24 hr tablet TAKE 1 TABLET(25 MG) BY MOUTH EVERY DAY    [DISCONTINUED] spironolactone (ALDACTONE) 25 MG tablet Take 1 tablet (25 mg total) by mouth once daily.     Family History       Problem Relation (Age of Onset)    Diverticulosis Brother    Heart attack Maternal Grandmother, Maternal Grandfather    Heart failure Father          Tobacco Use    Smoking status: Former Smoker     Packs/day: 0.50     Years: 16.00     Pack years: 8.00     Start date: 10/1/2004     Quit date: 2021     Years since quittin.2    Smokeless tobacco: Never Used   Substance and Sexual Activity    Alcohol use: Not Currently    Drug use: Not Currently     Types: Marijuana, MDMA (Ecstacy)    Sexual activity: Yes     Partners: Female     Birth control/protection: None     Review of Systems   Constitutional:  Negative for chills, fatigue and fever.   HENT:  Negative for congestion, hearing loss, rhinorrhea, sore throat and tinnitus.    Eyes:  Negative for photophobia and visual disturbance.   Respiratory:  Negative for cough, chest tightness, shortness of breath and wheezing.    Cardiovascular:  Negative for chest pain, palpitations and leg swelling.   Gastrointestinal:  Negative for abdominal pain, blood in stool, diarrhea, nausea and vomiting.   Genitourinary:  Negative for dysuria, frequency,  hematuria and urgency.   Musculoskeletal:  Negative for arthralgias, myalgias and neck stiffness.   Neurological:  Positive for dizziness. Negative for headaches.   Hematological:  Negative for adenopathy.   Psychiatric/Behavioral:  Negative for confusion.    Objective:     Vital Signs (Most Recent):  Temp: 97.7 °F (36.5 °C) (07/20/22 0806)  Pulse: 84 (07/20/22 0806)  Resp: 16 (07/20/22 0806)  BP: (!) 84/0 (07/20/22 0811)  SpO2: 98 % (07/20/22 0806)   Vital Signs (24h Range):  Temp:  [97.2 °F (36.2 °C)-97.8 °F (36.6 °C)] 97.7 °F (36.5 °C)  Pulse:  [76-98] 84  Resp:  [12-21] 16  SpO2:  [95 %-99 %] 98 %  BP: ()/(0-72) 84/0     Weight: 84.1 kg (185 lb 6.5 oz)  Body mass index is 23.17 kg/m².    Physical Exam  Constitutional:       General: He is not in acute distress.     Appearance: Normal appearance.   HENT:      Head: Normocephalic and atraumatic.   Eyes:      General:         Right eye: No discharge.         Left eye: No discharge.      Extraocular Movements: Extraocular movements intact and EOM normal.      Pupils: Pupils are equal, round, and reactive to light.   Cardiovascular:      Rate and Rhythm: Normal rate and regular rhythm.      Comments: LVAD auscultated.  Pulmonary:      Effort: Pulmonary effort is normal. No respiratory distress.   Abdominal:      General: There is no distension.      Palpations: Abdomen is soft.      Tenderness: There is no abdominal tenderness. There is no guarding.   Musculoskeletal:         General: No swelling or tenderness.      Cervical back: No rigidity or tenderness.   Skin:     General: Skin is warm and dry.   Neurological:      Mental Status: He is alert and oriented to person, place, and time.      Motor: Motor strength is normal.      Coordination: Finger-Nose-Finger Test and Heel to Shin Test normal.      Deep Tendon Reflexes:      Reflex Scores:       Bicep reflexes are 2+ on the right side and 2+ on the left side.       Brachioradialis reflexes are 2+ on the right  side and 2+ on the left side.       Patellar reflexes are 2+ on the right side and 2+ on the left side.       Achilles reflexes are 2+ on the right side and 2+ on the left side.  Psychiatric:         Mood and Affect: Mood normal.         Speech: Speech normal.         Behavior: Behavior normal.         Thought Content: Thought content normal.       NEUROLOGICAL EXAMINATION:     MENTAL STATUS   Oriented to person, place, and time.   Attention: normal. Concentration: normal.   Speech: speech is normal   Level of consciousness: alertPraxis: normal     CRANIAL NERVES     CN III, IV, VI   Pupils are equal, round, and reactive to light.  Extraocular motions are normal.   Right pupil: Consensual response: intact. Accommodation: intact.   Left pupil: Consensual response: intact. Accommodation: intact.   CN III: no CN III palsy  CN VI: no CN VI palsy  Nystagmus: none   Diplopia: none    CN V   Facial sensation intact.     CN VII   Facial expression full, symmetric.     CN VIII   CN VIII normal.   Hearing: intact    CN IX, X   Palate: symmetric    CN XI   CN XI normal.     CN XII   Tongue: not atrophic  Fasciculations: absent  Tongue deviation: none    MOTOR EXAM   Overall muscle tone: normal  Right arm tone: normal  Left arm tone: normal  Right arm pronator drift: absent  Left arm pronator drift: absent  Right leg tone: normal  Left leg tone: normal    Strength   Strength 5/5 throughout.     REFLEXES     Reflexes   Right brachioradialis: 2+  Left brachioradialis: 2+  Right biceps: 2+  Left biceps: 2+  Right patellar: 2+  Left patellar: 2+  Right achilles: 2+  Left achilles: 2+  Right plantar: normal  Left plantar: normal  Right ankle clonus: absent  Left ankle clonus: absent    SENSORY EXAM   Light touch normal.   Right arm light touch: normal  Left arm light touch: normal  Right leg light touch: normal  Left leg light touch: normal  Vibration normal.   Right arm vibration: normal  Left arm vibration: normal  Right leg  vibration: normal  Left leg vibration: normal    GAIT AND COORDINATION      Coordination   Finger to nose coordination: normal  Heel to shin coordination: normal    Tremor   Resting tremor: absent  Intention tremor: absent    Significant Labs: All pertinent lab results from the past 24 hours have been reviewed.    Significant Imaging: I have reviewed all pertinent imaging results/findings within the past 24 hours.    Assessment and Plan:     Seizure-like activity    37 year-old male who had LVAD placed on 6/29, and was sent from ICU to cardiac step-down unit yesterday. He had two episodes of seizure-like activity, one yesterday, and one this morning. Both involve generalized convulsions and patient is unaware during these episodes. There is no associated tongue biting, eye deviation, loss of bowel or bladder control with either episode. There is no confusion after the episodes, and no preceding symptoms. He leans to the right during these, but there is no observed eye deviation. His serum chemistries have shown hyponatremia, hypokalemia, and hypomagnesemia that have coincided with the seizure-like events. These are currently being corrected by the primary team. WBC abruptly increased from 7/17 to 7/18 (8 to 20.07). He was complaining of chills on 7/18, following inadvertently removing the LVAD drive line. He was started on empiric vancomycin and cefepime. CT non-contrast of head on 7/19 showed no acute abnormality: there is a stable focus of hypo-attenuation along the left parasaggital occipital lobe.    Differential includes seizure, convulsive syncope, or orthostatic hypotensive event. Electrolyte abnormalities may also be contributing seizure-like activity. Cefepime may also be lowering the seizure threshold, contributing to the seizure-like events. Recommend switching from cefepime to other antibiotic, and EEG monitoring >1 hr at this time. MRI contraindicated due to LVAD. Will follow peripherally for EEG  results.    Recommendations  -Please switch from cefepime to another antibiotic due to cefepime's propensity to lower seizure threshold  -EEG monitoring >1 hr  -no MRI at this time due to LVAD  -No AED recommendations at this time due to uncertain etiology of event. Neurology will sign off at this time and follow up EEG result peripherally, and will message primary team with any additional recommendations following EEG read.  -outpatient follow up with epilepsy      VTE Risk Mitigation (From admission, onward)         Ordered     IP VTE HIGH RISK PATIENT  Once         06/30/22 1821     Place sequential compression device  Until discontinued         06/29/22 1548                Thank you for your consult. Neurology will sign off at this time.    Jeff Soria, DO  Neurology  [facility] - Cardiology Stepdown

## 2022-07-21 LAB
ALBUMIN SERPL BCP-MCNC: 3 G/DL (ref 3.5–5.2)
ALLENS TEST: ABNORMAL
ALP SERPL-CCNC: 140 U/L (ref 55–135)
ALT SERPL W/O P-5'-P-CCNC: 16 U/L (ref 10–44)
ANION GAP SERPL CALC-SCNC: 13 MMOL/L (ref 8–16)
APTT BLDCRRT: 29.3 SEC (ref 21–32)
AST SERPL-CCNC: 27 U/L (ref 10–40)
BILIRUB SERPL-MCNC: 1 MG/DL (ref 0.1–1)
BUN SERPL-MCNC: 12 MG/DL (ref 6–20)
CALCIUM SERPL-MCNC: 9.5 MG/DL (ref 8.7–10.5)
CHLORIDE SERPL-SCNC: 98 MMOL/L (ref 95–110)
CO2 SERPL-SCNC: 27 MMOL/L (ref 23–29)
CREAT SERPL-MCNC: 1.3 MG/DL (ref 0.5–1.4)
EST. GFR  (AFRICAN AMERICAN): >60 ML/MIN/1.73 M^2
EST. GFR  (NON AFRICAN AMERICAN): >60 ML/MIN/1.73 M^2
FIBRINOGEN PPP-MCNC: 531 MG/DL (ref 182–400)
GLUCOSE SERPL-MCNC: 138 MG/DL (ref 70–110)
HCO3 UR-SCNC: 29.4 MMOL/L (ref 24–28)
INR PPP: 1.8 (ref 0.8–1.2)
LDH SERPL L TO P-CCNC: 285 U/L (ref 110–260)
MAGNESIUM SERPL-MCNC: 1.6 MG/DL (ref 1.6–2.6)
MAGNESIUM SERPL-MCNC: 1.7 MG/DL (ref 1.6–2.6)
PCO2 BLDA: 47.3 MMHG (ref 35–45)
PH SMN: 7.4 [PH] (ref 7.35–7.45)
PO2 BLDA: 27 MMHG (ref 40–60)
POC BE: 5 MMOL/L
POC SATURATED O2: 50 % (ref 95–100)
POC TCO2: 31 MMOL/L (ref 24–29)
POCT GLUCOSE: 121 MG/DL (ref 70–110)
POCT GLUCOSE: 130 MG/DL (ref 70–110)
POCT GLUCOSE: 140 MG/DL (ref 70–110)
POCT GLUCOSE: 155 MG/DL (ref 70–110)
POCT GLUCOSE: 171 MG/DL (ref 70–110)
POTASSIUM SERPL-SCNC: 3.4 MMOL/L (ref 3.5–5.1)
POTASSIUM SERPL-SCNC: 3.4 MMOL/L (ref 3.5–5.1)
POTASSIUM SERPL-SCNC: 3.6 MMOL/L (ref 3.5–5.1)
PROT SERPL-MCNC: 8.2 G/DL (ref 6–8.4)
PROTHROMBIN TIME: 18.3 SEC (ref 9–12.5)
SAMPLE: ABNORMAL
SITE: ABNORMAL
SODIUM SERPL-SCNC: 138 MMOL/L (ref 136–145)

## 2022-07-21 PROCEDURE — 25000003 PHARM REV CODE 250

## 2022-07-21 PROCEDURE — 94761 N-INVAS EAR/PLS OXIMETRY MLT: CPT

## 2022-07-21 PROCEDURE — 93750 PR INTERROGATE VENT ASSIST DEV, IN PERSON, W PHYSICIAN ANALYSIS: ICD-10-PCS | Mod: ,,, | Performed by: INTERNAL MEDICINE

## 2022-07-21 PROCEDURE — 25000003 PHARM REV CODE 250: Performed by: PHYSICIAN ASSISTANT

## 2022-07-21 PROCEDURE — 93451 RIGHT HEART CATH: CPT | Performed by: INTERNAL MEDICINE

## 2022-07-21 PROCEDURE — 25000003 PHARM REV CODE 250: Performed by: INTERNAL MEDICINE

## 2022-07-21 PROCEDURE — 95813 EEG EXTND MNTR 61-119 MIN: CPT

## 2022-07-21 PROCEDURE — 84132 ASSAY OF SERUM POTASSIUM: CPT | Performed by: INTERNAL MEDICINE

## 2022-07-21 PROCEDURE — 99232 PR SUBSEQUENT HOSPITAL CARE,LEVL II: ICD-10-PCS | Mod: ,,, | Performed by: NURSE PRACTITIONER

## 2022-07-21 PROCEDURE — 85730 THROMBOPLASTIN TIME PARTIAL: CPT | Performed by: STUDENT IN AN ORGANIZED HEALTH CARE EDUCATION/TRAINING PROGRAM

## 2022-07-21 PROCEDURE — C1894 INTRO/SHEATH, NON-LASER: HCPCS | Performed by: INTERNAL MEDICINE

## 2022-07-21 PROCEDURE — 80053 COMPREHEN METABOLIC PANEL: CPT

## 2022-07-21 PROCEDURE — A4216 STERILE WATER/SALINE, 10 ML: HCPCS | Performed by: THORACIC SURGERY (CARDIOTHORACIC VASCULAR SURGERY)

## 2022-07-21 PROCEDURE — 99900035 HC TECH TIME PER 15 MIN (STAT)

## 2022-07-21 PROCEDURE — 99233 SBSQ HOSP IP/OBS HIGH 50: CPT | Mod: ,,, | Performed by: PHYSICIAN ASSISTANT

## 2022-07-21 PROCEDURE — 63600175 PHARM REV CODE 636 W HCPCS: Performed by: STUDENT IN AN ORGANIZED HEALTH CARE EDUCATION/TRAINING PROGRAM

## 2022-07-21 PROCEDURE — 93451 RIGHT HEART CATH: CPT | Mod: 26,,, | Performed by: INTERNAL MEDICINE

## 2022-07-21 PROCEDURE — 93750 INTERROGATION VAD IN PERSON: CPT | Mod: ,,, | Performed by: INTERNAL MEDICINE

## 2022-07-21 PROCEDURE — 99232 SBSQ HOSP IP/OBS MODERATE 35: CPT | Mod: ,,, | Performed by: NURSE PRACTITIONER

## 2022-07-21 PROCEDURE — 63600175 PHARM REV CODE 636 W HCPCS: Performed by: NURSE PRACTITIONER

## 2022-07-21 PROCEDURE — 63600175 PHARM REV CODE 636 W HCPCS: Performed by: INTERNAL MEDICINE

## 2022-07-21 PROCEDURE — 93451 PR RIGHT HEART CATH O2 SATURATION & CARDIAC OUTPUT: ICD-10-PCS | Mod: 26,,, | Performed by: INTERNAL MEDICINE

## 2022-07-21 PROCEDURE — 25000003 PHARM REV CODE 250: Performed by: STUDENT IN AN ORGANIZED HEALTH CARE EDUCATION/TRAINING PROGRAM

## 2022-07-21 PROCEDURE — C1751 CATH, INF, PER/CENT/MIDLINE: HCPCS | Performed by: INTERNAL MEDICINE

## 2022-07-21 PROCEDURE — 83615 LACTATE (LD) (LDH) ENZYME: CPT | Performed by: THORACIC SURGERY (CARDIOTHORACIC VASCULAR SURGERY)

## 2022-07-21 PROCEDURE — 25000003 PHARM REV CODE 250: Performed by: THORACIC SURGERY (CARDIOTHORACIC VASCULAR SURGERY)

## 2022-07-21 PROCEDURE — 25000003 PHARM REV CODE 250: Performed by: NURSE PRACTITIONER

## 2022-07-21 PROCEDURE — 85610 PROTHROMBIN TIME: CPT | Performed by: PHYSICIAN ASSISTANT

## 2022-07-21 PROCEDURE — 99233 PR SUBSEQUENT HOSPITAL CARE,LEVL III: ICD-10-PCS | Mod: ,,, | Performed by: PHYSICIAN ASSISTANT

## 2022-07-21 PROCEDURE — 85384 FIBRINOGEN ACTIVITY: CPT | Performed by: STUDENT IN AN ORGANIZED HEALTH CARE EDUCATION/TRAINING PROGRAM

## 2022-07-21 PROCEDURE — 20600001 HC STEP DOWN PRIVATE ROOM

## 2022-07-21 PROCEDURE — 83735 ASSAY OF MAGNESIUM: CPT | Performed by: INTERNAL MEDICINE

## 2022-07-21 PROCEDURE — 63600175 PHARM REV CODE 636 W HCPCS: Performed by: PHYSICIAN ASSISTANT

## 2022-07-21 PROCEDURE — 27000248 HC VAD-ADDITIONAL DAY

## 2022-07-21 PROCEDURE — 25000003 PHARM REV CODE 250: Performed by: ANESTHESIOLOGY

## 2022-07-21 RX ORDER — MAGNESIUM SULFATE HEPTAHYDRATE 40 MG/ML
2 INJECTION, SOLUTION INTRAVENOUS ONCE
Status: COMPLETED | OUTPATIENT
Start: 2022-07-22 | End: 2022-07-22

## 2022-07-21 RX ORDER — MIRTAZAPINE 15 MG/1
15 TABLET, FILM COATED ORAL NIGHTLY
Status: DISCONTINUED | OUTPATIENT
Start: 2022-07-21 | End: 2022-07-28 | Stop reason: HOSPADM

## 2022-07-21 RX ORDER — SODIUM CHLORIDE 0.9 G/100ML
IRRIGANT IRRIGATION
Status: DISCONTINUED | OUTPATIENT
Start: 2022-07-21 | End: 2022-07-28 | Stop reason: HOSPADM

## 2022-07-21 RX ORDER — LIDOCAINE HYDROCHLORIDE 20 MG/ML
INJECTION, SOLUTION INFILTRATION; PERINEURAL
Status: DISCONTINUED | OUTPATIENT
Start: 2022-07-21 | End: 2022-07-28 | Stop reason: HOSPADM

## 2022-07-21 RX ADMIN — GABAPENTIN 100 MG: 100 CAPSULE ORAL at 09:07

## 2022-07-21 RX ADMIN — INSULIN ASPART 2 UNITS: 100 INJECTION, SOLUTION INTRAVENOUS; SUBCUTANEOUS at 04:07

## 2022-07-21 RX ADMIN — POTASSIUM CHLORIDE 40 MEQ: 20 TABLET, EXTENDED RELEASE ORAL at 03:07

## 2022-07-21 RX ADMIN — CEFEPIME 1 G: 1 INJECTION, POWDER, FOR SOLUTION INTRAMUSCULAR; INTRAVENOUS at 06:07

## 2022-07-21 RX ADMIN — FUROSEMIDE 30 MG/HR: 10 INJECTION, SOLUTION INTRAMUSCULAR; INTRAVENOUS at 04:07

## 2022-07-21 RX ADMIN — ACETAMINOPHEN 650 MG: 325 TABLET ORAL at 03:07

## 2022-07-21 RX ADMIN — Medication 10 ML: at 12:07

## 2022-07-21 RX ADMIN — MIRTAZAPINE 15 MG: 15 TABLET, FILM COATED ORAL at 09:07

## 2022-07-21 RX ADMIN — POTASSIUM CHLORIDE 40 MEQ: 20 TABLET, EXTENDED RELEASE ORAL at 09:07

## 2022-07-21 RX ADMIN — INSULIN ASPART 10 UNITS: 100 INJECTION, SOLUTION INTRAVENOUS; SUBCUTANEOUS at 08:07

## 2022-07-21 RX ADMIN — GABAPENTIN 100 MG: 100 CAPSULE ORAL at 03:07

## 2022-07-21 RX ADMIN — CEFEPIME 1 G: 1 INJECTION, POWDER, FOR SOLUTION INTRAMUSCULAR; INTRAVENOUS at 04:07

## 2022-07-21 RX ADMIN — TIZANIDINE 2 MG: 2 TABLET ORAL at 09:07

## 2022-07-21 RX ADMIN — FUROSEMIDE 30 MG/HR: 10 INJECTION, SOLUTION INTRAMUSCULAR; INTRAVENOUS at 08:07

## 2022-07-21 RX ADMIN — INSULIN ASPART 10 UNITS: 100 INJECTION, SOLUTION INTRAVENOUS; SUBCUTANEOUS at 04:07

## 2022-07-21 RX ADMIN — FAMOTIDINE 40 MG: 20 TABLET ORAL at 08:07

## 2022-07-21 RX ADMIN — TIZANIDINE 2 MG: 2 TABLET ORAL at 03:07

## 2022-07-21 RX ADMIN — Medication 10 ML: at 06:07

## 2022-07-21 RX ADMIN — WARFARIN SODIUM 5 MG: 5 TABLET ORAL at 04:07

## 2022-07-21 RX ADMIN — ACETAMINOPHEN 650 MG: 325 TABLET ORAL at 09:07

## 2022-07-21 RX ADMIN — VANCOMYCIN HYDROCHLORIDE 1250 MG: 1.25 INJECTION, POWDER, LYOPHILIZED, FOR SOLUTION INTRAVENOUS at 04:07

## 2022-07-21 RX ADMIN — OXYCODONE HYDROCHLORIDE 10 MG: 10 TABLET ORAL at 09:07

## 2022-07-21 RX ADMIN — BUSPIRONE HYDROCHLORIDE 7.5 MG: 5 TABLET ORAL at 06:07

## 2022-07-21 RX ADMIN — GABAPENTIN 100 MG: 100 CAPSULE ORAL at 08:07

## 2022-07-21 RX ADMIN — Medication 324 MG: at 08:07

## 2022-07-21 RX ADMIN — FUROSEMIDE 30 MG/HR: 10 INJECTION, SOLUTION INTRAMUSCULAR; INTRAVENOUS at 03:07

## 2022-07-21 RX ADMIN — TIZANIDINE 2 MG: 2 TABLET ORAL at 06:07

## 2022-07-21 RX ADMIN — INSULIN DETEMIR 22 UNITS: 100 INJECTION, SOLUTION SUBCUTANEOUS at 09:07

## 2022-07-21 RX ADMIN — SPIRONOLACTONE 25 MG: 25 TABLET, FILM COATED ORAL at 08:07

## 2022-07-21 RX ADMIN — DOBUTAMINE 2.5 MCG/KG/MIN: 12.5 INJECTION, SOLUTION, CONCENTRATE INTRAVENOUS at 08:07

## 2022-07-21 RX ADMIN — POTASSIUM CHLORIDE 40 MEQ: 20 TABLET, EXTENDED RELEASE ORAL at 08:07

## 2022-07-21 RX ADMIN — ACETAMINOPHEN 650 MG: 325 TABLET ORAL at 06:07

## 2022-07-21 NOTE — ASSESSMENT & PLAN NOTE
-on empiric Vanc and Cefepime for today of 5 days (end date 7/23)  -ID consulted appreciate their recommendations  -WBC improving.  Will check CBC tomorrow

## 2022-07-21 NOTE — PROGRESS NOTES
Patient tolerated the procedure well. Please see complete [facility] procedure note for full details and results. Stable to return from cath lab to their hospital room.  Procedure: Right heart catheterization   Procedure date: 07/21/22    Discharge date: 07/21/22  Estimated blood loss: less than 10 cc  Complications: none  Condition at discharge: stable  Discharge instructions: no heavy lifting greater than 5 lbs and no bearing down/coughing without holding pressure over incision site.  Activity: as tolerated after 24 hours  Diet: as tolerated  Dispo: hospital room

## 2022-07-21 NOTE — NURSING
"LVAD dressing change completed using sterile technique with daily kit. DLES is a "2" with minimal serosanguineous drainage noted on the drain sponge. Tolerated without any complication. Sutures remain intact, no redness, or tenderness noted.Pt tolerated it well.    "

## 2022-07-21 NOTE — ASSESSMENT & PLAN NOTE
- NIDCM.  - Was not evaluated for OHTx as he quit smoking in April 2022.   -Diuresing well on Lasix drip  - GDMT: Spironolactone   - See LVAD   The patient comes in today with cough, congestion for the last 10 days.    Patient states she's been sick for the last 10 days with cough, congestion and occasional wheezing. Denies any sinus congestion, postnasal drip, facial pain etc. denies any fevers or chills. Has not had any medications for relief of symptoms. She does not smoke.    Patient feels her depression and anxiety are fairly well controlled but she is having medication adjustments made by her psychiatrist. Denies any suicidal or homicidal ideation. She is sleeping fairly well.    Review of systems is as above.    Past Medical History:   Diagnosis Date   • Anemia    • Anxiety and depression    • Benign essential tremor    • Depressive disorder    • Essential (primary) hypertension    • Inocencia's deformity    • Hyperlipidemia    • Ischemic colitis (CMS/HCC)    • Long term current use of non-steroidal anti-inflammatories (NSAID) 6/28/2013   • Migraines    • Myalgia and myositis, unspecified 6/28/2013   • Osteoarthritis    • Spinal headache        Past Surgical History:   Procedure Laterality Date   • Achilles tendon surgery  04/12/2006   • Ankle surgery  02/27/2007    disrupted ligament repair   • Carpal tunnel release      right July, August Left   • Esophagogastroduodenoscopy transoral flex w/bx single or mult  01/03/2012    EGD with Bx   • Ganglion cyst excision  11/22/2011    hand or finger   • Heel spur surgery  08/03/2012    ostectomy, calcaneus; partial ostectomy tarsals and metatarsals   • Hysteroscopy endometrial ablation  04/03/2012   • Hysteroscopy,biopsy  01/30/2009   • Knee arthroscopy w/ laser  6/14/14    RT KNEE   • Knee scope,diagnostic  10/25/2007    Knee Arthroscopy, synovectomy   • Knee scope,diagnostic      Knee Arthroscopy, multiple   • Neck surgery      fusion c4-5 c5-6   • Tendon graft  04/09/2009    foot       Social History     Social History   • Marital status:      Spouse name: N/A   • Number of children: N/A   • Years  of education: N/A     Occupational History   • Not on file.     Social History Main Topics   • Smoking status: Never Smoker   • Smokeless tobacco: Never Used   • Alcohol use 0.5 oz/week     1 Standard drinks or equivalent per week   • Drug use: No   • Sexual activity: Not on file     Other Topics Concern   • Not on file     Social History Narrative   • No narrative on file       Family History   Problem Relation Age of Onset   • High blood pressure Mother    • High cholesterol Mother    • Diabetes Mother    • Other Mother    • High blood pressure Father    • High cholesterol Father    • Asthma Father    • Anxiety disorder Father    • Cancer Father    • Substance abuse Brother    • High blood pressure Brother    • High cholesterol Brother    • Anxiety disorder Brother    • Parkinsonism Maternal Grandmother    • High blood pressure Brother    • Depression Brother    • Other Brother          Current Outpatient Prescriptions   Medication Sig Dispense Refill   • simvastatin (ZOCOR) 40 MG tablet TAKE 1 TABLET BY MOUTH EVERY NIGHT 30 tablet 0   • propranolol (INDERAL) 80 MG tablet TAKE 1 TABLET BY MOUTH THREE TIMES DAILY 90 tablet 0   • fenofibrate (TRICOR) 145 MG tablet TAKE 1 TABLET BY MOUTH DAILY 90 tablet 0   • gentamicin (GARAMYCIN) 0.3 % ophthalmic solution Place 2 drops into both eyes 3 times daily. 5 mL 0   • clotrimazole (LOTRIMIN AF) 1 % cream Apply to affected area twice daily 30 g 0   • naproxen (NAPROSYN) 500 MG tablet TAKE 1 TABLET BY MOUTH TWICE DAILY WITH MEALS FOR 1 WEEK THEN AS NEEDED 60 tablet 0   • DULoxetine (CYMBALTA) 60 MG capsule Take 60 mg by mouth at bedtime.     • cetirizine-pseudoephedrine (ZYRTEC-D) 5-120 MG per tablet Take 1 tablet by mouth 2 times daily. 48 tablet 11   • FREESTYLE LITE test strip TEST EVERY DAY AS DIRECTED 50 strip 5   • albuterol 108 (90 Base) MCG/ACT inhaler Inhale 2 puffs into the lungs every 4 hours as needed for Shortness of Breath or Wheezing. 1 Inhaler 1   •  metroNIDAZOLE (METROGEL) 1 % gel APPLY AS DIRECTED TO FACE EVERY DAY 60 g 5   • blood glucose meter Test blood sugar 1 times daily as directed. Diagnosis: elevated a1c R73.09 . Meter: per insurance Please dispense all needed supplies. 1 kit 0   • mupirocin (BACTROBAN) 2 % ointment Apply warm wet compresses to the affected area for 15 minutes 3 times daily and then apply mupirocin ointment with a Band-Aid 22 g 0   • memantine (NAMENDA) 5 MG tablet Take 5 mg by mouth 2 times daily.     • BIOTIN PO Take 1 tablet by mouth daily.     • Probiotic Product (PROBIOTIC DAILY PO) Take 1 capsule by mouth daily.     • Brimonidine Tartrate 0.33 % Gel Apply topically 1 time. 1 Units 5   • naratriptan (AMERGE) 1 MG Tab Take 1 mg by mouth as needed.     • clonazePAM (KLONOPIN) 0.5 MG tablet Take 1 tablet by mouth 2 times daily as needed for Anxiety. 40 tablet 0   • Cinnamon 500 MG TABS Take 1,000 mg by mouth 2 times daily.     • Cholecalciferol (VITAMIN D3) 2000 UNITS capsule Take 2,000 Units by mouth daily.     • lisinopril (ZESTRIL) 5 MG tablet TAKE 1 TABLET BY MOUTH DAILY 30 tablet 5   • LANSOPRAZOLE PO Take 20 mg by mouth 2 times daily.        No current facility-administered medications for this visit.        ALLERGIES:   Allergen Reactions   • Codeine      Abnormal breathing.   • Darvocet [Propoxyphene N-Apap] SHORTNESS OF BREATH and PRURITUS   • Levaquin Muscle Spasm     Patient reports achilles tendon's rupture while taking med.    • Percocet [Oxycodone-Acetaminophen] HIVES and SHORTNESS OF BREATH   • Vicodin [Hydrocodone-Acetaminophen] HIVES and SHORTNESS OF BREATH   • Bell Peppers PRURITUS   • Biaxin [Clarithromycin] Nausea & Vomiting     GERD   • Ceftin DIARRHEA   • Cefzil DIARRHEA   • Latex PRURITUS   • Medrol [Methylprednisolone] HIVES         Physical Exam    VITAL SIGNS:    Visit Vitals  /84   Pulse 88   Temp 98.9 °F (37.2 °C) (Temporal Artery)   Resp 14   Ht 5' 4\" (1.626 m)   Wt 69.9 kg   BMI 26.43 kg/m²         General:  Well-appearing. In no acute distress   HEENT:  Normocephalic. Atraumatic. Pupils equal and reactive to light. Extra ocular movements intact. Normal conjunctiva. Sclera normal. Bilateral external canals normal. Tympanic membranes visualized, normal. Nose normal. Oropharynx moist. No oral exudates. No tonsillar enlargement, or uvular edema.   Neck: Normal range of motion. No tenderness. Supple. No thyromegaly noted. No lymphadenopathy.   Cardiovascular:  Normal rate. Normal rhythm. No murmurs, gallops, or rubs.    Respiratory:  No respiratory distress. Normal breath sounds. No crackles. No wheezing.    Abdomen:   Soft, non distended. No tenderness. No masses. No hepatosplenomegaly. Normal bowel sounds. No rebound, no guarding.    Skin:  Warm. Dry. No erythema. No rash, no bruising.    Neurologic:  Alert and oriented x 3. Cranial nerves intact. Normal motor function. Strength is 5/5.  Deep tendon reflexes normal.  Normal sensory function. No focal deficits noted.    Extremitites: no pedal edema, no rash.     Assessment and Plan      1. Acute bacterial bronchitis. After discussion, start azithromycin or Z-Deshaun, Tessalon Perles and albuterol inhaler. Potential side effects reviewed. Follow-up if not improved.     2. Major depression. After discussion, continue current management with current medications and follow-up with psychiatry    3. Anxiety. Currently stable. Continue current management.    4. Patient referred for screening mammogram.

## 2022-07-21 NOTE — PT/OT/SLP PROGRESS
Occupational Therapy      Patient Name:  Kevan Queen   MRN:  [id number]    Patient not seen today secondary to undergoing EEG 2* seizures then going for RHC. Will follow up 7/22 7/21/2022

## 2022-07-21 NOTE — PROGRESS NOTES
"[facility] - Cardiology Stepdown  Endocrinology  Progress Note    Admit Date: 2022     Reason for Consult: Management of  type 2 DM, Hyperglycemia     Surgical Procedure and Date: none    Diabetes diagnosis year: 21    Home Diabetes Medications:  Detemir  23  units daily and Aspart   12  units TIDWM and  Mod dose correction insulin    Lab Results   Component Value Date    HGBA1C 9.2 (H) 2022           How often checking glucose at home? 1-3 x day   BG readings on regimen: 180- up to 300 once  Hypoglycemia on the regimen?  yes once- related to not really eating after taking aspart   Missed doses on regimen?  no    Diabetes Complications include:     Hyperglycemia    Complicating diabetes co morbidities:   History of MI, CHF, and CKD      HPI:   Patient is a 37 y.o. male with a diagnosis of type 2 DM and NIDCM with BiV systolic heart failure. Patient was presented at [facility]  and was  approved for VAD. Also recently admitted with [facility]; he had clinic appointment last week to f/u recent admit for hyperglycemic hyperosmolar syndrome but did not come as he was "feeling too bad" presents to  ED with SOB. Endocrinology consulted for BG/ DM management.                Interval HPI:   Overnight events: No acute events overnight. Patient on the CSU in room 316/316 A. Blood glucose stable. BG at and above goal on current insulin regimen (SSI, prandial, and basal insulin ). Steroid use- None. 21 Days Post-Op  Renal function- Normal  Vasopressors-  None       Diet [facility]; Consistent Carbohydrate; Fluid - 1500mL  (Eating outside food)  Eatin%  Nausea: No  Hypoglycemia and intervention: No  Fever: No  TPN and/or TF: No      BP (!) 85/59 (BP Location: Left arm, Patient Position: Lying)   Pulse 96   Temp 97.6 °F (36.4 °C) (Tympanic)   Resp 14   Ht 6' 3" (1.905 m)   Wt 84.1 kg (185 lb 6.5 oz)   SpO2 98%   BMI 23.17 kg/m²     Labs Reviewed and Include    Recent Labs   Lab " 07/21/22  0430   *   CALCIUM 9.5   ALBUMIN 3.0*   PROT 8.2      K 3.4*  3.4*   CO2 27   CL 98   BUN 12   CREATININE 1.3   ALKPHOS 140*   ALT 16   AST 27   BILITOT 1.0     Lab Results   Component Value Date    WBC 12.44 07/20/2022    HGB 7.8 (L) 07/20/2022    HCT 25.9 (L) 07/20/2022    MCV 84 07/20/2022     (H) 07/20/2022     No results for input(s): TSH, FREET4 in the last 168 hours.  Lab Results   Component Value Date    HGBA1C 9.2 (H) 05/20/2022       Nutritional status:   Body mass index is 23.17 kg/m².  Lab Results   Component Value Date    ALBUMIN 3.0 (L) 07/21/2022    ALBUMIN 2.9 (L) 07/20/2022    ALBUMIN 2.9 (L) 07/19/2022     Lab Results   Component Value Date    PREALBUMIN 12 (L) 07/17/2022    PREALBUMIN 12 (L) 07/14/2022    PREALBUMIN 12 (L) 07/07/2022       Estimated Creatinine Clearance: 92.5 mL/min (based on SCr of 1.3 mg/dL).    Accu-Checks  Recent Labs     07/18/22  2021 07/18/22  2153 07/19/22  0242 07/19/22  0815 07/19/22  1247 07/19/22  1645 07/19/22  2109 07/20/22  0814 07/20/22  1216 07/20/22  1636   POCTGLUCOSE 212* 170* 280* 166* 158* 126* 155* 151* 136* 200*       Current Medications and/or Treatments Impacting Glycemic Control  Immunotherapy:    Immunosuppressants       None          Steroids:   Hormones (From admission, onward)                None          Pressors:    Autonomic Drugs (From admission, onward)                None          Hyperglycemia/Diabetes Medications:   Antihyperglycemics (From admission, onward)                Start     Stop Route Frequency Ordered    07/18/22 1503  insulin aspart U-100 pen 4 Units         -- SubQ As needed (PRN) 07/18/22 1404    07/16/22 2100  insulin detemir U-100 pen 22 Units         -- SubQ Nightly 07/16/22 0636    07/13/22 1130  insulin aspart U-100 pen 10 Units         -- SubQ 3 times daily with meals 07/13/22 1106    07/10/22 1718  insulin aspart U-100 pen 1-10 Units         -- SubQ Before meals & nightly PRN 07/10/22 1619             ASSESSMENT and PLAN    * LVAD (left ventricular assist device) present  Managed per primary team  Avoid hypoglycemia        Uncontrolled diabetes mellitus  Endocrinology consulted for BG management.   BG goal 140-180    - Continue levemir 22 units HS.   - Continue Novolog 10 units TIDWM and prn for BG excursions [facility] (150/25) SSI.  - Continue Novolog 4 units prn for overnight snack  - BG monitoring ac/hs and moderate dose correction scale.   - Hypoglycemia protocol in place.     ** Please notify Endocrine for any change and/or advance in diet**  ** Please call Endocrine for any BG related issues **    Discharge Planning:   TBD. Please notify endocrinology prior to discharge.        Acute on chronic combined systolic and diastolic congestive heart failure, NYHA class 4  Optimize glucose control and  avoid hypoglycemia    Managed per primary.       CKD (chronic kidney disease) stage 3, GFR 30-59 ml/min    Titrate insulin slowly to avoid hypoglycemia as the risk of hypoglycemia increases with decreased creatinine clearance.    Estimated Creatinine Clearance: 92.5 mL/min (based on SCr of 1.3 mg/dL).      Surgical wound present  Optimize BG for surgical wound healing.              Michael Wyman, DNP, FNP  Endocrinology  [facility] - Cardiology Stepdown

## 2022-07-21 NOTE — ASSESSMENT & PLAN NOTE
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  - HTS primary   - GASTON trial   - INR was supratherapeuitc and coumadin was held. Goal 2.0-3.0 INR is 1.8 today.  Pharmacy to dose   - LDH stable  - CVP 10. Cont. IV Lasix gtt to 30 mg/hr.   - ECHO 7/19 with speed at 5100- EF: 10%, LVEDD: 6mc (up from 4.24 with speed at 5200)  -Will plan for RHC today       Procedure: Device Interrogation Including analysis of device parameters  Current Settings: Ventricular Assist Device  Review of device function is stable    TXP LVAD INTERROGATIONS 7/21/2022 7/21/2022 7/21/2022 7/20/2022 7/20/2022 7/20/2022 7/20/2022   Type HeartMate3 HeartMate3 HeartMate3 HeartMate3 HeartMate3 HeartMate3 HeartMate3   Flow 4.0 4.3 4.3 3.7 3.8 3.9 4.2   Speed 5100 5100 5100 5100 5100 5100 5100   PI 5.4 3.3 3.6 5.3 4.9 5.0 4.4   Power (Serna) 3.6 3.5 3.6 3.6 3.6 3.6 3.6   LSL 4700 4700 4700 4700 4700 4700 4700   Pulsatility Intermittent pulse Intermittent pulse - - Pulse Intermittent pulse Intermittent pulse

## 2022-07-21 NOTE — NURSING
"LVAD dressing change completed using sterile technique with daily kit. DLES is a "2" with minimal sanguineous drainage noted on the drain sponge.Tolerated without any complication. Sutures remain intact, no redness, or tenderness noted.Pt tolerated it well.    "

## 2022-07-21 NOTE — NURSING
AAOX4,VSS,O2 sats>97% on RA.Patient ambulating independently, fall precautions in place.LVAD DP and numbers WNL, smooth LVAD hum. Patient has no complaints of pain/SOB. Pt getting diuresed with lasix gtts,diuresing well. gtts infusing at 2.5mcgs/kgs/min.Discussed medications and care.Patient has no questions at this time.Pt visualised and stable.Pt resting well,call light within reach, bed at the lowest position.Family by the bedside.

## 2022-07-21 NOTE — PROGRESS NOTES
Interdisciplinary Rounds Report:   Attended interdisciplinary rounds with the [facility]/CTS services including the LVAD Coordinators, social workers, cardiologists, surgeons,  PT/OT/Speech, dietician, and unit charge nurses. Discussed patient status, plan of care, goals of care, including DC date, and post discharge needs. Plan of care will be discussed with the patient and/or family per the physician while rounding on the floor. This is a recurring meeting that is medically and socially necessary to collaborate with the interdisciplinary team to assist patient needs and safe discharge.

## 2022-07-21 NOTE — PT/OT/SLP PROGRESS
Physical Therapy      Patient Name:  Kevan Queen   MRN:  [id number]    Patient not seen today. RN hold. Pt on 1 hour of EEG due to prior seizure activity and then going for a right heart cath. Will follow-up when appropriate.

## 2022-07-21 NOTE — INTERVAL H&P NOTE
The patient has been examined and the H&P has been reviewed:    I concur with the findings and no changes have occurred since H&P was written.    Procedure risks, benefits and alternative options discussed and understood by patient/family.    Here for RHC to evaluate filling pressures and cardiac flows.       Active Hospital Problems    Diagnosis  POA    *LVAD (left ventricular assist device) present [Z95.811]  Not Applicable    Seizure-like activity [R56.9]  Unknown     37 year-old male who had LVAD placed on 6/29, and was sent from ICU to cardiac step-down unit yesterday. He was had two episodes of seizure-like activity. One yesterday, and one this morning. Both involve generalized convulsions and patient is unaware during these episodes. There is no associated tongue biting, eye deviation, loss of bowel or bladder control with either episode. There is no confusion after the episodes, and no preceding symptoms. His serum chemistries have shown hyponatremia, hypokalemia, and hypomagnesemia that have coincided with the seizure-like events. These are currently being corrected by the primary team. WBC abruptly increased from 7/17 to 7/18 (8 to 20.07). He was complaining of chills on 7/18, following inadvertently removing the LVAD drive line. He was started on empiric vancomycin and cefepime. CT non-contrast of head on 7/19 showed no acute abnormality: there is a stable focus of hypo-attenuation along the left parasaggital occipital lobe. Differential includes seizure, myoclonus. Seizure-like activity may be due to electrolyte abnormalities. Cefepime, buspirone, may also be lowering the seizure threshold, contributing to these events. Recommend discontinuing the cefepime and buspirone, starting the patient on Keppra, and EEG monitoring >1 hr at this time. MRI contraindicated due to LVAD.    Recommendations  -discontinue cefepime, buspirone: lowers seizure threshold  -Keppra: 2000mg loading dose, followed by 1000mg  BID  -EEG monitoring >1 hr  -no MRI at this time due to LVAD        Leukocytosis [D72.829]  Unknown    Shaking [R25.1]  Unknown    Tingling in extremities [R20.2]  No    Cardiogenic shock [R57.0]  Yes    Hyponatremia [E87.1]  Unknown    Encounter for weaning from ventilator [Z99.11]  Not Applicable    Surgical wound present [T14.8XXA]  No    Dilated cardiomyopathy [I42.0]  Yes    Staphylococcus epidermidis bacteremia [R78.81, B95.7]  No    Palliative care encounter [Z51.5]  Not Applicable    Goals of care, counseling/discussion [Z71.89]  Not Applicable    Advanced care planning/counseling discussion [Z71.89]  Not Applicable    Tachycardia [R00.0]  Yes    SOB (shortness of breath) [R06.02]  Yes    Uncontrolled diabetes mellitus [E11.65]  Yes     Lab Results   Component Value Date    HGBA1C 9.2 (H) 05/20/2022           CKD (chronic kidney disease) stage 3, GFR 30-59 ml/min [N18.30]  Yes     CMP  Sodium   Date Value Ref Range Status   05/27/2022 136 136 - 145 mmol/L Final     Potassium   Date Value Ref Range Status   05/27/2022 4.2 3.5 - 5.1 mmol/L Final     Chloride   Date Value Ref Range Status   05/27/2022 101 95 - 110 mmol/L Final     CO2   Date Value Ref Range Status   05/27/2022 24 23 - 29 mmol/L Final     Glucose   Date Value Ref Range Status   05/27/2022 191 (H) 70 - 110 mg/dL Final     BUN   Date Value Ref Range Status   05/27/2022 26 (H) 6 - 20 mg/dL Final     Creatinine   Date Value Ref Range Status   05/27/2022 1.5 (H) 0.5 - 1.4 mg/dL Final     Calcium   Date Value Ref Range Status   05/27/2022 10.0 8.7 - 10.5 mg/dL Final     Total Protein   Date Value Ref Range Status   05/26/2022 8.0 6.0 - 8.4 g/dL Final     Albumin   Date Value Ref Range Status   05/26/2022 3.9 3.5 - 5.2 g/dL Final     Total Bilirubin   Date Value Ref Range Status   05/26/2022 0.5 0.1 - 1.0 mg/dL Final     Comment:     For infants and newborns, interpretation of results should be based  on gestational age, weight and in agreement with  clinical  observations.    Premature Infant recommended reference ranges:  Up to 24 hours.............<8.0 mg/dL  Up to 48 hours............<12.0 mg/dL  3-5 days..................<15.0 mg/dL  6-29 days.................<15.0 mg/dL       Alkaline Phosphatase   Date Value Ref Range Status   05/26/2022 92 55 - 135 U/L Final     AST   Date Value Ref Range Status   05/26/2022 31 10 - 40 U/L Final     ALT   Date Value Ref Range Status   05/26/2022 28 10 - 44 U/L Final     Anion Gap   Date Value Ref Range Status   05/27/2022 11 8 - 16 mmol/L Final     eGFR if    Date Value Ref Range Status   05/27/2022 >60.0 >60 mL/min/1.73 m^2 Final     eGFR if non    Date Value Ref Range Status   05/27/2022 58.6 (A) >60 mL/min/1.73 m^2 Final     Comment:     Calculation used to obtain the estimated glomerular filtration  rate (eGFR) is the CKD-EPI equation.            Congestive heart failure [I50.9]  Yes    NICM (nonischemic cardiomyopathy) [I42.8]  Yes     At this time not clear if familial but highly suspicious for this entity with father having had a heart transplant here; he was using ecstasy prior to this diagnosis        Transaminitis [R74.01]  No    Acute on chronic combined systolic and diastolic congestive heart failure, NYHA class 4 [I50.43]  Unknown      Resolved Hospital Problems    Diagnosis Date Resolved POA    Muscle cramping [R25.2] 06/30/2022 No    Acute decompensated heart failure [I50.9] 07/05/2022 Yes

## 2022-07-21 NOTE — SUBJECTIVE & OBJECTIVE
Interval History: Plan for 1 hour spot EEG today, but he hasn't had any shaking episodes since yesterday morning.  Aldactone added yesterday without adverse effects.  Plan per ID is to continue Vanc and Cefepime empirically for 5 days.  Today is day 3/5 (last doses to be given 7/23).  ICD interrogation negative for arrhythmias.  Will change Trazodone to remeron at patients request.  INR is 1.8 today, so will plan for RHC today     Continuous Infusions:   sodium chloride 0.9% 5 mL/hr at 06/27/22 0055    sodium chloride 0.9% Stopped (07/18/22 2000)    DOBUTamine IV infusion (non-titrating) 2.5 mcg/kg/min (07/21/22 0839)    furosemide (LASIX) 2 mg/mL continuous infusion (non-titrating) 30 mg/hr (07/21/22 0838)     Scheduled Meds:   acetaminophen  650 mg Oral Q8H    busPIRone  7.5 mg Oral Daily    ceFEPime (MAXIPIME) IVPB  1 g Intravenous Q8H    famotidine  40 mg Oral Daily    ferrous gluconate  324 mg Oral Daily with breakfast    gabapentin  100 mg Oral TID    insulin aspart U-100  10 Units Subcutaneous TIDWM    insulin detemir U-100  22 Units Subcutaneous QHS    INV aspirin/placebo  100 mg Oral Daily    polyethylene glycol  17 g Oral Daily    potassium chloride  40 mEq Oral TID    sodium chloride 0.9%  10 mL Intravenous Q6H    spironolactone  25 mg Oral Daily    tiZANidine  2 mg Oral Q8H    traZODone  50 mg Oral QHS    vancomycin (VANCOCIN) IVPB  1,250 mg Intravenous Q12H    warfarin  5 mg Oral Daily     PRN Meds:albuterol sulfate, bisacodyL, dextrose 10%, dextrose 10%, docusate sodium, glucagon (human recombinant), glucose, glucose, insulin aspart U-100, insulin aspart U-100, magnesium sulfate IVPB, ondansetron, oxyCODONE, polyethylene glycol, promethazine (PHENERGAN) IVPB, sodium chloride 0.9%, sodium chloride 0.9%, Flushing PICC Protocol **AND** sodium chloride 0.9% **AND** sodium chloride 0.9%, traMADoL, Pharmacy to dose Vancomycin consult **AND** vancomycin - pharmacy to dose    Review of patient's allergies  indicates:   Allergen Reactions    Aspirin Other (See Comments)     Mr. Thacker is enrolled in Dr. Paige's Alon Trial and cannot have any aspirin and/or aspirin-containing products. DO NOT cancel any orders for INV Aspirin 100 mg/Placebo. If you have questions, please contact Isabel @ [phone], [phone],[email or web address], secure chat or MS Teams message.    Bumex [bumetanide] Hives    Lactose Diarrhea     Other reaction(s): Abdominal distension, gaseous    Torsemide Hives     Objective:     Vital Signs (Most Recent):  Temp: 98.1 °F (36.7 °C) (07/21/22 1144)  Pulse: 89 (07/21/22 1144)  Resp: 16 (07/21/22 1144)  BP: (!) 84/0 (07/21/22 1144)  SpO2: 99 % (07/21/22 1144)   Vital Signs (24h Range):  Temp:  [97.6 °F (36.4 °C)-98.1 °F (36.7 °C)] 98.1 °F (36.7 °C)  Pulse:  [83-98] 89  Resp:  [12-18] 16  SpO2:  [97 %-99 %] 99 %  BP: (72-97)/(0-73) 84/0     Patient Vitals for the past 72 hrs (Last 3 readings):   Weight   07/21/22 0825 83.9 kg (184 lb 15.5 oz)   07/20/22 0800 84.1 kg (185 lb 6.5 oz)   07/19/22 1559 86.6 kg (191 lb)       Body mass index is 23.12 kg/m².      Intake/Output Summary (Last 24 hours) at 7/21/2022 1241  Last data filed at 7/21/2022 1145  Gross per 24 hour   Intake 664 ml   Output 3045 ml   Net -2381 ml       Hemodynamic Parameters: CVP 11       Telemetry: ST    Physical Exam  Vitals and nursing note reviewed.   Constitutional:       Appearance: Normal appearance.   HENT:      Head: Normocephalic and atraumatic.   Eyes:      Extraocular Movements: Extraocular movements intact.      Conjunctiva/sclera: Conjunctivae normal.   Cardiovascular:      Rate and Rhythm: Regular rhythm. Tachycardia present.      Comments: Smooth VAD hum  Pulmonary:      Breath sounds: Normal breath sounds.   Abdominal:      Palpations: Abdomen is soft.   Musculoskeletal:         General: No swelling.      Cervical back: Neck supple.      Right lower leg: No edema.      Left lower leg: No edema.   Skin:     General:  Skin is dry.      Capillary Refill: Capillary refill takes 2 to 3 seconds.   Neurological:      General: No focal deficit present.      Mental Status: He is alert and oriented to person, place, and time.      Motor: No weakness.   Psychiatric:         Mood and Affect: Mood normal.         Behavior: Behavior normal.         Thought Content: Thought content normal.         Judgment: Judgment normal.       Significant Labs:  CBC:  Recent Labs   Lab 07/18/22 2021 07/19/22 0228 07/20/22  1215   WBC 20.78* 20.07* 12.44   RBC 2.90* 2.99* 3.10*   HGB 7.3* 7.4* 7.8*   HCT 23.1* 23.7* 25.9*    396 459*   MCV 80* 79* 84   MCH 25.2* 24.7* 25.2*   MCHC 31.6* 31.2* 30.1*       BNP:  Recent Labs   Lab 07/18/22 0311 07/20/22  0358   * 496*       CMP:  Recent Labs   Lab 07/19/22 0228 07/19/22  1825 07/20/22  0358 07/20/22  1215 07/20/22  1749 07/21/22  0430   *  --  182* 115*  --  138*   CALCIUM 9.1  --  9.3 9.6  --  9.5   ALBUMIN 2.9*  --  2.9*  --   --  3.0*   PROT 7.8  --  8.0  --   --  8.2   *  --  135* 136  --  138   K 3.6  3.6   < > 2.7*  2.7* 4.2 4.4 3.4*  3.4*   CO2 22*  --  27 27  --  27   CL 96  --  96 99  --  98   BUN 17  --  15 15  --  12   CREATININE 1.8*  --  1.3 1.4  --  1.3   ALKPHOS 159*  --  145*  --   --  140*   ALT 19  --  17  --   --  16   AST 29  --  28  --   --  27   BILITOT 1.1*  --  0.9  --   --  1.0    < > = values in this interval not displayed.        Coagulation:   Recent Labs   Lab 07/19/22 0228 07/20/22  0358 07/21/22  0430   INR 4.4* 2.6* 1.8*   APTT 36.0* 34.5* 29.3       LDH:  Recent Labs   Lab 07/19/22  0228 07/20/22  0358 07/21/22  0430   * 290* 285*       Microbiology:  Microbiology Results (last 7 days)       Procedure Component Value Units Date/Time    Blood culture [[id number]] Collected: 07/19/22 0006    Order Status: Completed Specimen: Blood from Peripheral, Antecubital, Left Updated: 07/21/22 0613     Blood Culture, Routine No Growth to date      No  Growth to date      No Growth to date            I have reviewed all pertinent labs within the past 24 hours.    Estimated Creatinine Clearance: 92.3 mL/min (based on SCr of 1.3 mg/dL).    Diagnostic Results:  I have reviewed and interpreted all pertinent imaging results/findings within the past 24 hours.

## 2022-07-21 NOTE — PROGRESS NOTES
"[facility]  Adult Nutrition  Progress Note    SUMMARY       Recommendations  1. Continue current cardiac/consistent carbohydrate diet-fluid per MD  2. RD following    Goals: Meet % EEN/EPN by RD f/u  Nutrition Goal Status: goal met  Communication of RD Recs:  (POC)    Assessment and Plan    Nutrition Problem  Increased protein needs      Related to (etiology):   Physiological values      Signs and Symptoms (as evidenced by):   LVAD w/u     Interventions (treatment strategy):  Collaboration of nutrition care w/ other providers      Nutrition Diagnosis Status:   Continues     Reason for Assessment  Reason For Assessment: RD follow-up  Diagnosis: cardiac disease  Relevant Medical History: CHF, DM, cardiomyopathy  Interdisciplinary Rounds: attended  General Information Comments: Spoke w/ pt at bedside, reports consuming small bites of food d/t being tired of the food provided. Reports receiving outside food consisting of potatoes, mixed veggies + steak. Pt denies any issues chewing/swallowing at this time. NFPE conducted 7/21/22, pt w/ no s/s of malnutrition at this time. LBM noted-7/17/22  Nutrition Discharge Planning: cardiac/diabetic diet     Nutrition Risk Screen    Nutrition Risk Screen: no indicators present    Nutrition/Diet History    Spiritual, Cultural Beliefs, Lutheran Practices, Values that Affect Care: no  Food Allergies:  (lactose)    Anthropometrics    Temp: 98.1 °F (36.7 °C)  Height Method: Stated  Height: 6' 3" (190.5 cm)  Height (inches): 75 in  Weight Method: Standard Scale  Weight: 83.9 kg (184 lb 15.5 oz)  Weight (lb): 184.97 lb  Ideal Body Weight (IBW), Male: 196 lb  % Ideal Body Weight, Male (lb): 97.45 %  BMI (Calculated): 23.1  BMI Grade: 25 - 29.9 - overweight       Lab/Procedures/Meds    Pertinent Labs Reviewed: reviewed  Pertinent Labs Comments: Glucose 138, Potassium 3.4  Pertinent Medications Reviewed: reviewed  Pertinent Medications Comments: " coumadin      Estimated/Assessed Needs    Weight Used For Calorie Calculations: 97.5 kg (214 lb 15.2 oz)  Energy Calorie Requirements (kcal): 2183  Energy Need Method: [place]-St Jeor (PAL:1.1)  Protein Requirements: 117-125g (1.2-1.3g/kg)  Weight Used For Protein Calculations: 97.5 kg (214 lb 15.2 oz)  Fluid Requirements (mL): 1mL/kcal or fluid per MD     RDA Method (mL): 2183  CHO Requirement: 272      Nutrition Prescription Ordered    Current Diet Order: Cardiac diet + 1500mL fluid restriction    Evaluation of Received Nutrient/Fluid Intake    I/O: -33.5 L since 7/7/22  Energy Calories Required: not meeting needs  Protein Required: not meeting needs  Fluid Required: not meeting needs  Total Fluid Intake (mL/kg): 1 ml or fluid per MD  Tolerance: tolerating  % Intake of Estimated Energy Needs: 50%  % Meal Intake: 50%    Nutrition Risk    Level of Risk/Frequency of Follow-up: low ((1x/week))     Monitor and Evaluation    Food and Nutrient Intake: energy intake, food and beverage intake  Food and Nutrient Adminstration: diet order  Knowledge/Beliefs/Attitudes: food and nutrition knowledge/skill, beliefs and attitudes  Physical Activity and Function: nutrition-related ADLs and IADLs, factors affecting access to physical activity  Anthropometric Measurements: growth pattern indices/percentile ranks, body mass index, weight change, weight, height/length  Biochemical Data, Medical Tests and Procedures: lipid profile, inflammatory profile, glucose/endocrine profile, gastrointestinal profile, electrolyte and renal panel  Nutrition-Focused Physical Findings: skin, head and eyes, extremities, muscles and bones, overall appearance     Nutrition Follow-Up    RD Follow-up?: Yes

## 2022-07-21 NOTE — PLAN OF CARE
Pt maintained free from falls/trauma/injuries and skin breakdown. Pt c/o moderate incisional chest pain and scheduled tylenol given. Dobu going at 2.5 mcg/kg/min. Lasix going at 30 mg/hr. CVP of 10 and SvO2 of 50 obtained. Lab drawn. Plan of care reviewed. Pt verbalized understanding. All questions and concerns addressed. Will continue to monitor.

## 2022-07-21 NOTE — ASSESSMENT & PLAN NOTE
-No post ictal period  -Appreciate Neurology consult.  EEG ordered for 1 hours  -orthostatic BP negative  -device interrogation negative

## 2022-07-21 NOTE — PLAN OF CARE
AAOX4,VSS,O2 sats>97% on RA.Patient ambulating independently with standby assist, fall precautions in place.LVAD DP and numbers WNL, smooth LVAD hum. Patient has no complaints of pain/SOB. Pt getting diuresed with lasix gtts,diuresing well. gtts infusing at 2.5mcgs/kgs/min as per EMAR.Abx administered as ordered.Discussed medications and care.Patient has no questions at this time.Pt visualised and stable.Pt resting well,call light within reach, bed at the lowest position.

## 2022-07-21 NOTE — PROGRESS NOTES
[facility] - Cardiology Stepdown  Heart Transplant  Progress Note    Patient Name: Kevan Queen  MRN: [id number]  Admission Date: 6/13/2022  Hospital Length of Stay: 38 days  Attending Physician: Angela Yang DO  Primary Care Provider: ORALIA Cline  Principal Problem:LVAD (left ventricular assist device) present    Subjective:     Interval History: Plan for 1 hour spot EEG today, but he hasn't had any shaking episodes since yesterday morning.  Aldactone added yesterday without adverse effects.  Plan per ID is to continue Vanc and Cefepime empirically for 5 days.  Today is day 3/5 (last doses to be given 7/23).  ICD interrogation negative for arrhythmias.  Will change Trazodone to remeron at patients request.  INR is 1.8 today, so will plan for RHC today     Continuous Infusions:   sodium chloride 0.9% 5 mL/hr at 06/27/22 0055    sodium chloride 0.9% Stopped (07/18/22 2000)    DOBUTamine IV infusion (non-titrating) 2.5 mcg/kg/min (07/21/22 0839)    furosemide (LASIX) 2 mg/mL continuous infusion (non-titrating) 30 mg/hr (07/21/22 0838)     Scheduled Meds:   acetaminophen  650 mg Oral Q8H    busPIRone  7.5 mg Oral Daily    ceFEPime (MAXIPIME) IVPB  1 g Intravenous Q8H    famotidine  40 mg Oral Daily    ferrous gluconate  324 mg Oral Daily with breakfast    gabapentin  100 mg Oral TID    insulin aspart U-100  10 Units Subcutaneous TIDWM    insulin detemir U-100  22 Units Subcutaneous QHS    INV aspirin/placebo  100 mg Oral Daily    polyethylene glycol  17 g Oral Daily    potassium chloride  40 mEq Oral TID    sodium chloride 0.9%  10 mL Intravenous Q6H    spironolactone  25 mg Oral Daily    tiZANidine  2 mg Oral Q8H    traZODone  50 mg Oral QHS    vancomycin (VANCOCIN) IVPB  1,250 mg Intravenous Q12H    warfarin  5 mg Oral Daily     PRN Meds:albuterol sulfate, bisacodyL, dextrose 10%, dextrose 10%, docusate sodium, glucagon (human recombinant), glucose, glucose, insulin aspart U-100,  insulin aspart U-100, magnesium sulfate IVPB, ondansetron, oxyCODONE, polyethylene glycol, promethazine (PHENERGAN) IVPB, sodium chloride 0.9%, sodium chloride 0.9%, Flushing PICC Protocol **AND** sodium chloride 0.9% **AND** sodium chloride 0.9%, traMADoL, Pharmacy to dose Vancomycin consult **AND** vancomycin - pharmacy to dose    Review of patient's allergies indicates:   Allergen Reactions    Aspirin Other (See Comments)     Mr. Thacker is enrolled in Dr. Paige's Alon Trial and cannot have any aspirin and/or aspirin-containing products. DO NOT cancel any orders for INV Aspirin 100 mg/Placebo. If you have questions, please contact Isabel @ [phone], [phone],[email or web address], secure chat or MS Teams message.    Bumex [bumetanide] Hives    Lactose Diarrhea     Other reaction(s): Abdominal distension, gaseous    Torsemide Hives     Objective:     Vital Signs (Most Recent):  Temp: 98.1 °F (36.7 °C) (07/21/22 1144)  Pulse: 89 (07/21/22 1144)  Resp: 16 (07/21/22 1144)  BP: (!) 84/0 (07/21/22 1144)  SpO2: 99 % (07/21/22 1144)   Vital Signs (24h Range):  Temp:  [97.6 °F (36.4 °C)-98.1 °F (36.7 °C)] 98.1 °F (36.7 °C)  Pulse:  [83-98] 89  Resp:  [12-18] 16  SpO2:  [97 %-99 %] 99 %  BP: (72-97)/(0-73) 84/0     Patient Vitals for the past 72 hrs (Last 3 readings):   Weight   07/21/22 0825 83.9 kg (184 lb 15.5 oz)   07/20/22 0800 84.1 kg (185 lb 6.5 oz)   07/19/22 1559 86.6 kg (191 lb)       Body mass index is 23.12 kg/m².      Intake/Output Summary (Last 24 hours) at 7/21/2022 1241  Last data filed at 7/21/2022 1145  Gross per 24 hour   Intake 664 ml   Output 3045 ml   Net -2381 ml       Hemodynamic Parameters: CVP 11       Telemetry: ST    Physical Exam  Vitals and nursing note reviewed.   Constitutional:       Appearance: Normal appearance.   HENT:      Head: Normocephalic and atraumatic.   Eyes:      Extraocular Movements: Extraocular movements intact.      Conjunctiva/sclera: Conjunctivae normal.    Cardiovascular:      Rate and Rhythm: Regular rhythm. Tachycardia present.      Comments: Smooth VAD hum  Pulmonary:      Breath sounds: Normal breath sounds.   Abdominal:      Palpations: Abdomen is soft.   Musculoskeletal:         General: No swelling.      Cervical back: Neck supple.      Right lower leg: No edema.      Left lower leg: No edema.   Skin:     General: Skin is dry.      Capillary Refill: Capillary refill takes 2 to 3 seconds.   Neurological:      General: No focal deficit present.      Mental Status: He is alert and oriented to person, place, and time.      Motor: No weakness.   Psychiatric:         Mood and Affect: Mood normal.         Behavior: Behavior normal.         Thought Content: Thought content normal.         Judgment: Judgment normal.       Significant Labs:  CBC:  Recent Labs   Lab 07/18/22 2021 07/19/22  0228 07/20/22  1215   WBC 20.78* 20.07* 12.44   RBC 2.90* 2.99* 3.10*   HGB 7.3* 7.4* 7.8*   HCT 23.1* 23.7* 25.9*    396 459*   MCV 80* 79* 84   MCH 25.2* 24.7* 25.2*   MCHC 31.6* 31.2* 30.1*       BNP:  Recent Labs   Lab 07/18/22  0311 07/20/22  0358   * 496*       CMP:  Recent Labs   Lab 07/19/22 0228 07/19/22  1825 07/20/22  0358 07/20/22  1215 07/20/22  1749 07/21/22  0430   *  --  182* 115*  --  138*   CALCIUM 9.1  --  9.3 9.6  --  9.5   ALBUMIN 2.9*  --  2.9*  --   --  3.0*   PROT 7.8  --  8.0  --   --  8.2   *  --  135* 136  --  138   K 3.6  3.6   < > 2.7*  2.7* 4.2 4.4 3.4*  3.4*   CO2 22*  --  27 27  --  27   CL 96  --  96 99  --  98   BUN 17  --  15 15  --  12   CREATININE 1.8*  --  1.3 1.4  --  1.3   ALKPHOS 159*  --  145*  --   --  140*   ALT 19  --  17  --   --  16   AST 29  --  28  --   --  27   BILITOT 1.1*  --  0.9  --   --  1.0    < > = values in this interval not displayed.        Coagulation:   Recent Labs   Lab 07/19/22  0228 07/20/22  0358 07/21/22  0430   INR 4.4* 2.6* 1.8*   APTT 36.0* 34.5* 29.3       LDH:  Recent Labs   Lab  "07/19/22  0228 07/20/22  0358 07/21/22  0430   * 290* 285*       Microbiology:  Microbiology Results (last 7 days)       Procedure Component Value Units Date/Time    Blood culture [[id number]] Collected: 07/19/22 0006    Order Status: Completed Specimen: Blood from Peripheral, Antecubital, Left Updated: 07/21/22 0613     Blood Culture, Routine No Growth to date      No Growth to date      No Growth to date            I have reviewed all pertinent labs within the past 24 hours.    Estimated Creatinine Clearance: 92.3 mL/min (based on SCr of 1.3 mg/dL).    Diagnostic Results:  I have reviewed and interpreted all pertinent imaging results/findings within the past 24 hours.    Assessment and Plan:     36 yo male with NIDCM with BiV systolic heart failure, on home Milrinone at 0.375 mcg/kg/min, presented [facility] 4/[facility]/22 ,was not evaluated for OHT as he has recently quit smoking in April 2022 but was approved for VAD with plan to begin OHT evaluation in upcoming months if Mr Queen is stable and suitable for OHT eval (blood group A), 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 Went to ED at [facility] 6/10 but left after waiting 4 hours. Had clinic appointment with us today, but arrived to [facility] early this morning and decided to go to the ED instead. Baseline Lasix dose is 80 mg bid. Reports taking 240 mg qd past 3 days with no improvement. BNP is 1701, up from 898 on 6/2 and 49 on 5/24. sCr is 1.8 with baseline ~ 1.5-1.7. sPO2 on RA is 93%. Wife at bedside    He has been given Lasix 80 mg IVP in the ED with plan to start Lasix gtt at 20 mg/hr           * LVAD (left ventricular assist device) " present  - S/P DT HM3 with MVR plus Protek Duo for RV support 6/29 (Grecia)  - RVAD removed with concern for hemolysis.  - HTS primary   - GASTON trial   - INR was supratherapeuitc and coumadin was held. Goal 2.0-3.0 INR is 1.8 today.  Pharmacy to dose   - LDH stable  - CVP 10. Cont. IV Lasix gtt to 30 mg/hr.   - ECHO 7/19 with speed at 5100- EF: 10%, LVEDD: 6mc (up from 4.24 with speed at 5200)  -Will plan for RHC today       Procedure: Device Interrogation Including analysis of device parameters  Current Settings: Ventricular Assist Device  Review of device function is stable    TXP LVAD INTERROGATIONS 7/21/2022 7/21/2022 7/21/2022 7/20/2022 7/20/2022 7/20/2022 7/20/2022   Type HeartMate3 HeartMate3 HeartMate3 HeartMate3 HeartMate3 HeartMate3 HeartMate3   Flow 4.0 4.3 4.3 3.7 3.8 3.9 4.2   Speed 5100 5100 5100 5100 5100 5100 5100   PI 5.4 3.3 3.6 5.3 4.9 5.0 4.4   Power (Serna) 3.6 3.5 3.6 3.6 3.6 3.6 3.6   LSL 4700 4700 4700 4700 4700 4700 4700   Pulsatility Intermittent pulse Intermittent pulse - - Pulse Intermittent pulse Intermittent pulse       Acute on chronic combined systolic and diastolic congestive heart failure, NYHA class 4  - NIDCM.  - Was not evaluated for OHTx as he quit smoking in April 2022.   -Diuresing well on Lasix drip  - GDMT: Spironolactone   - See LVAD    Staphylococcus epidermidis bacteremia  - Blood cultures 6/20 and repeat 6/21 positive for Staph Epi. One of two blood cultures from 6/23 positive for Staph (taken prior to line exchange). Repeat cultures sent 6/25 NGTD  - IJ exchanged on 6/23, IABP exchanged 6/23  - ID recommended 14 day course of vancomycin from VAD implantation on 6/29 with end date: 7/12/22. Vancomycin discontinued per CTS with concerns for elevated sCr. ID with new recs to complete 7d course of daptomycin, which was completed 7/7/22    CKD (chronic kidney disease) stage 3, GFR 30-59 ml/min  SCr baseline ~ 1.5-1.7    Leukocytosis  -on empiric Vanc and Cefepime for today  of 5 days (end date 7/23)  -ID consulted appreciate their recommendations  -WBC improving.  Will check CBC tomorrow       Hyponatremia  -Hypervolemic, now improved  -Discontinued salt tablets   -Improvement in Na with diuresis    Shaking  -No post ictal period  -Appreciate Neurology consult.  EEG ordered for 1 hours  -orthostatic BP negative  -device interrogation negative       Uncontrolled diabetes mellitus  Admitted 5/24-5/27 with hyperglycemia hyperosmolar syndrome  - Hgb A1C 9.2 on 5/20/22  - Endocrine following, on insulin gtt    Tingling in extremities  - Pt reports paresthesias in right toes and fingers x 3 days   - Possible radiculopathy vs electrolyte abnormality vs neurovascular etiology   - Consulted General Neurology, suspect compression from surgical positioning/carpal tunnel syndrome. Recommended brace to L hand/wrist. If no improvement, will need outpatient EMG/NCS        DEYA Martinez  Heart Transplant  [facility] - Cardiology Stepdown

## 2022-07-21 NOTE — PROGRESS NOTES
07/21/2022  Casey Arizmendi    Current provider:  Angela Yang DO    Device interrogation:  TXP LVAD INTERROGATIONS 7/21/2022 7/21/2022 7/21/2022 7/20/2022 7/20/2022 7/20/2022 7/20/2022   Type HeartMate3 HeartMate3 HeartMate3 HeartMate3 HeartMate3 HeartMate3 HeartMate3   Flow 4.0 4.3 4.3 3.7 3.8 3.9 4.2   Speed 5100 5100 5100 5100 5100 5100 5100   PI 5.4 3.3 3.6 5.3 4.9 5.0 4.4   Power (Serna) 3.6 3.5 3.6 3.6 3.6 3.6 3.6   LSL 4700 4700 4700 4700 4700 4700 4700   Pulsatility Intermittent pulse Intermittent pulse - - Pulse Intermittent pulse Intermittent pulse          Rounded on Kevan Queen to ensure all mechanical assist device settings (IABP or VAD) were appropriate and all parameters were within limits.  I was able to ensure all back up equipment was present, the staff had no issues, and the Perfusion Department daily rounding was complete.      For implantable VADs: Interrogation of Ventricular assist device was performed with analysis of device parameters and review of device function. I have personally reviewed the interrogation findings and agree with findings as stated.     In emergency, the nursing units have been notified to contact the perfusion department either by:  Calling [id number] from 630am to 4pm Mon thru Fri, utilizing the On-Call Finder functionality of Epic and searching for Perfusion, or by contacting the hospital  from 4pm to 630am and on weekends and asking to speak with the perfusionist on call.    3:57 PM

## 2022-07-21 NOTE — SUBJECTIVE & OBJECTIVE
"Interval HPI:   Overnight events: No acute events overnight. Patient on the CSU in room 316/316 A. Blood glucose stable. BG at and above goal on current insulin regimen (SSI, prandial, and basal insulin ). Steroid use- None. 21 Days Post-Op  Renal function- Normal  Vasopressors-  None       Diet Cardiac [facility]; Consistent Carbohydrate; Fluid - 1500mL  (Eating outside food)  Eatin%  Nausea: No  Hypoglycemia and intervention: No  Fever: No  TPN and/or TF: No      BP (!) 85/59 (BP Location: Left arm, Patient Position: Lying)   Pulse 96   Temp 97.6 °F (36.4 °C) (Tympanic)   Resp 14   Ht 6' 3" (1.905 m)   Wt 84.1 kg (185 lb 6.5 oz)   SpO2 98%   BMI 23.17 kg/m²     Labs Reviewed and Include    Recent Labs   Lab 22  0430   *   CALCIUM 9.5   ALBUMIN 3.0*   PROT 8.2      K 3.4*  3.4*   CO2 27   CL 98   BUN 12   CREATININE 1.3   ALKPHOS 140*   ALT 16   AST 27   BILITOT 1.0     Lab Results   Component Value Date    WBC 12.44 2022    HGB 7.8 (L) 2022    HCT 25.9 (L) 2022    MCV 84 2022     (H) 2022     No results for input(s): TSH, FREET4 in the last 168 hours.  Lab Results   Component Value Date    HGBA1C 9.2 (H) 2022       Nutritional status:   Body mass index is 23.17 kg/m².  Lab Results   Component Value Date    ALBUMIN 3.0 (L) 2022    ALBUMIN 2.9 (L) 2022    ALBUMIN 2.9 (L) 2022     Lab Results   Component Value Date    PREALBUMIN 12 (L) 2022    PREALBUMIN 12 (L) 2022    PREALBUMIN 12 (L) 2022       Estimated Creatinine Clearance: 92.5 mL/min (based on SCr of 1.3 mg/dL).    Accu-Checks  Recent Labs     223 22  0242 22  0815 22  1247 22  1645 22  2109 22  0814 22  1216 22  1636   POCTGLUCOSE 212* 170* 280* 166* 158* 126* 155* 151* 136* 200*       Current Medications and/or Treatments Impacting Glycemic Control  Immunotherapy:  "   Immunosuppressants       None          Steroids:   Hormones (From admission, onward)                None          Pressors:    Autonomic Drugs (From admission, onward)                None          Hyperglycemia/Diabetes Medications:   Antihyperglycemics (From admission, onward)                Start     Stop Route Frequency Ordered    07/18/22 1503  insulin aspart U-100 pen 4 Units         -- SubQ As needed (PRN) 07/18/22 1404    07/16/22 2100  insulin detemir U-100 pen 22 Units         -- SubQ Nightly 07/16/22 0636    07/13/22 1130  insulin aspart U-100 pen 10 Units         -- SubQ 3 times daily with meals 07/13/22 1106    07/10/22 1718  insulin aspart U-100 pen 1-10 Units         -- SubQ Before meals & nightly PRN 07/10/22 1618

## 2022-07-21 NOTE — PLAN OF CARE
Recommendations  1. Continue current cardiac/consistent carbohydrate diet-fluid per MD  2. RD following     Goals: Meet % EEN/EPN by RD f/u  Nutrition Goal Status: goal met  Communication of RD Recs:  (POC)

## 2022-07-22 DIAGNOSIS — E05.80 AMIODARONE-INDUCED HYPERTHYROIDISM: ICD-10-CM

## 2022-07-22 DIAGNOSIS — T46.2X5A AMIODARONE-INDUCED HYPERTHYROIDISM: ICD-10-CM

## 2022-07-22 DIAGNOSIS — Z95.811 LVAD (LEFT VENTRICULAR ASSIST DEVICE) PRESENT: Primary | ICD-10-CM

## 2022-07-22 LAB
ALBUMIN SERPL BCP-MCNC: 3.1 G/DL (ref 3.5–5.2)
ALLENS TEST: ABNORMAL
ALP SERPL-CCNC: 146 U/L (ref 55–135)
ALT SERPL W/O P-5'-P-CCNC: 16 U/L (ref 10–44)
ANION GAP SERPL CALC-SCNC: 11 MMOL/L (ref 8–16)
APTT BLDCRRT: 27.3 SEC (ref 21–32)
AST SERPL-CCNC: 24 U/L (ref 10–40)
BASOPHILS # BLD AUTO: 0.03 K/UL (ref 0–0.2)
BASOPHILS NFR BLD: 0.3 % (ref 0–1.9)
BILIRUB SERPL-MCNC: 1.1 MG/DL (ref 0.1–1)
BNP SERPL-MCNC: 318 PG/ML (ref 0–99)
BUN SERPL-MCNC: 12 MG/DL (ref 6–20)
CALCIUM SERPL-MCNC: 9.5 MG/DL (ref 8.7–10.5)
CHLORIDE SERPL-SCNC: 98 MMOL/L (ref 95–110)
CO2 SERPL-SCNC: 28 MMOL/L (ref 23–29)
CREAT SERPL-MCNC: 1.5 MG/DL (ref 0.5–1.4)
CRP SERPL-MCNC: 27.2 MG/L (ref 0–8.2)
DELSYS: ABNORMAL
DIFFERENTIAL METHOD: ABNORMAL
EOSINOPHIL # BLD AUTO: 0.1 K/UL (ref 0–0.5)
EOSINOPHIL NFR BLD: 1 % (ref 0–8)
ERYTHROCYTE [DISTWIDTH] IN BLOOD BY AUTOMATED COUNT: 18.2 % (ref 11.5–14.5)
EST. GFR  (AFRICAN AMERICAN): >60 ML/MIN/1.73 M^2
EST. GFR  (NON AFRICAN AMERICAN): 58.6 ML/MIN/1.73 M^2
FIBRINOGEN PPP-MCNC: 492 MG/DL (ref 182–400)
GLUCOSE SERPL-MCNC: 171 MG/DL (ref 70–110)
HCO3 UR-SCNC: 31.1 MMOL/L (ref 24–28)
HCT VFR BLD AUTO: 26.3 % (ref 40–54)
HGB BLD-MCNC: 8.2 G/DL (ref 14–18)
IMM GRANULOCYTES # BLD AUTO: 0.03 K/UL (ref 0–0.04)
IMM GRANULOCYTES NFR BLD AUTO: 0.3 % (ref 0–0.5)
INR PPP: 2 (ref 0.8–1.2)
LDH SERPL L TO P-CCNC: 266 U/L (ref 110–260)
LYMPHOCYTES # BLD AUTO: 1.9 K/UL (ref 1–4.8)
LYMPHOCYTES NFR BLD: 18.1 % (ref 18–48)
MAGNESIUM SERPL-MCNC: 1.9 MG/DL (ref 1.6–2.6)
MAGNESIUM SERPL-MCNC: 2.2 MG/DL (ref 1.6–2.6)
MCH RBC QN AUTO: 25 PG (ref 27–31)
MCHC RBC AUTO-ENTMCNC: 31.2 G/DL (ref 32–36)
MCV RBC AUTO: 80 FL (ref 82–98)
MODE: ABNORMAL
MONOCYTES # BLD AUTO: 1 K/UL (ref 0.3–1)
MONOCYTES NFR BLD: 9.3 % (ref 4–15)
NEUTROPHILS # BLD AUTO: 7.6 K/UL (ref 1.8–7.7)
NEUTROPHILS NFR BLD: 71 % (ref 38–73)
NRBC BLD-RTO: 0 /100 WBC
PCO2 BLDA: 49.5 MMHG (ref 35–45)
PH SMN: 7.41 [PH] (ref 7.35–7.45)
PLATELET # BLD AUTO: 514 K/UL (ref 150–450)
PMV BLD AUTO: 9.1 FL (ref 9.2–12.9)
PO2 BLDA: 29 MMHG (ref 40–60)
POC BE: 6 MMOL/L
POC SATURATED O2: 55 % (ref 95–100)
POC TCO2: 33 MMOL/L (ref 24–29)
POCT GLUCOSE: 168 MG/DL (ref 70–110)
POCT GLUCOSE: 171 MG/DL (ref 70–110)
POCT GLUCOSE: 316 MG/DL (ref 70–110)
POCT GLUCOSE: 82 MG/DL (ref 70–110)
POTASSIUM SERPL-SCNC: 3.4 MMOL/L (ref 3.5–5.1)
POTASSIUM SERPL-SCNC: 3.4 MMOL/L (ref 3.5–5.1)
POTASSIUM SERPL-SCNC: 3.9 MMOL/L (ref 3.5–5.1)
PROT SERPL-MCNC: 8.4 G/DL (ref 6–8.4)
PROTHROMBIN TIME: 20.3 SEC (ref 9–12.5)
RBC # BLD AUTO: 3.28 M/UL (ref 4.6–6.2)
SAMPLE: ABNORMAL
SITE: ABNORMAL
SODIUM SERPL-SCNC: 137 MMOL/L (ref 136–145)
VANCOMYCIN SERPL-MCNC: 19.5 UG/ML
VANCOMYCIN TROUGH SERPL-MCNC: 19.3 UG/ML (ref 10–22)
WBC # BLD AUTO: 10.69 K/UL (ref 3.9–12.7)

## 2022-07-22 PROCEDURE — 83735 ASSAY OF MAGNESIUM: CPT | Performed by: INTERNAL MEDICINE

## 2022-07-22 PROCEDURE — 95813 PR EEG, EXTENDED, 61-119 MINS: ICD-10-PCS | Mod: 26,,, | Performed by: PSYCHIATRY & NEUROLOGY

## 2022-07-22 PROCEDURE — 63600175 PHARM REV CODE 636 W HCPCS: Performed by: NURSE PRACTITIONER

## 2022-07-22 PROCEDURE — 20600001 HC STEP DOWN PRIVATE ROOM

## 2022-07-22 PROCEDURE — 99233 SBSQ HOSP IP/OBS HIGH 50: CPT | Mod: FS,,, | Performed by: INTERNAL MEDICINE

## 2022-07-22 PROCEDURE — 93750 INTERROGATION VAD IN PERSON: CPT | Mod: ,,, | Performed by: INTERNAL MEDICINE

## 2022-07-22 PROCEDURE — 27000248 HC VAD-ADDITIONAL DAY

## 2022-07-22 PROCEDURE — 25000003 PHARM REV CODE 250: Performed by: INTERNAL MEDICINE

## 2022-07-22 PROCEDURE — 82803 BLOOD GASES ANY COMBINATION: CPT

## 2022-07-22 PROCEDURE — 84132 ASSAY OF SERUM POTASSIUM: CPT | Performed by: INTERNAL MEDICINE

## 2022-07-22 PROCEDURE — 63600175 PHARM REV CODE 636 W HCPCS: Performed by: PHYSICIAN ASSISTANT

## 2022-07-22 PROCEDURE — 93750 PR INTERROGATE VENT ASSIST DEV, IN PERSON, W PHYSICIAN ANALYSIS: ICD-10-PCS | Mod: ,,, | Performed by: INTERNAL MEDICINE

## 2022-07-22 PROCEDURE — 25000003 PHARM REV CODE 250: Performed by: STUDENT IN AN ORGANIZED HEALTH CARE EDUCATION/TRAINING PROGRAM

## 2022-07-22 PROCEDURE — 25000003 PHARM REV CODE 250: Performed by: ANESTHESIOLOGY

## 2022-07-22 PROCEDURE — 83880 ASSAY OF NATRIURETIC PEPTIDE: CPT | Performed by: STUDENT IN AN ORGANIZED HEALTH CARE EDUCATION/TRAINING PROGRAM

## 2022-07-22 PROCEDURE — 63600175 PHARM REV CODE 636 W HCPCS: Performed by: INTERNAL MEDICINE

## 2022-07-22 PROCEDURE — 63600175 PHARM REV CODE 636 W HCPCS

## 2022-07-22 PROCEDURE — 25000003 PHARM REV CODE 250: Performed by: NURSE PRACTITIONER

## 2022-07-22 PROCEDURE — 80202 ASSAY OF VANCOMYCIN: CPT | Mod: 91 | Performed by: INTERNAL MEDICINE

## 2022-07-22 PROCEDURE — 99233 PR SUBSEQUENT HOSPITAL CARE,LEVL III: ICD-10-PCS | Mod: FS,,, | Performed by: INTERNAL MEDICINE

## 2022-07-22 PROCEDURE — 83615 LACTATE (LD) (LDH) ENZYME: CPT | Performed by: THORACIC SURGERY (CARDIOTHORACIC VASCULAR SURGERY)

## 2022-07-22 PROCEDURE — 80053 COMPREHEN METABOLIC PANEL: CPT

## 2022-07-22 PROCEDURE — 85025 COMPLETE CBC W/AUTO DIFF WBC: CPT | Performed by: PHYSICIAN ASSISTANT

## 2022-07-22 PROCEDURE — 85384 FIBRINOGEN ACTIVITY: CPT | Performed by: STUDENT IN AN ORGANIZED HEALTH CARE EDUCATION/TRAINING PROGRAM

## 2022-07-22 PROCEDURE — 25000003 PHARM REV CODE 250

## 2022-07-22 PROCEDURE — 80202 ASSAY OF VANCOMYCIN: CPT | Performed by: INTERNAL MEDICINE

## 2022-07-22 PROCEDURE — 63600175 PHARM REV CODE 636 W HCPCS: Performed by: STUDENT IN AN ORGANIZED HEALTH CARE EDUCATION/TRAINING PROGRAM

## 2022-07-22 PROCEDURE — 25000003 PHARM REV CODE 250: Performed by: THORACIC SURGERY (CARDIOTHORACIC VASCULAR SURGERY)

## 2022-07-22 PROCEDURE — 86140 C-REACTIVE PROTEIN: CPT | Performed by: STUDENT IN AN ORGANIZED HEALTH CARE EDUCATION/TRAINING PROGRAM

## 2022-07-22 PROCEDURE — 95813 EEG EXTND MNTR 61-119 MIN: CPT | Mod: 26,,, | Performed by: PSYCHIATRY & NEUROLOGY

## 2022-07-22 PROCEDURE — 85610 PROTHROMBIN TIME: CPT | Performed by: PHYSICIAN ASSISTANT

## 2022-07-22 PROCEDURE — 25000003 PHARM REV CODE 250: Performed by: PHYSICIAN ASSISTANT

## 2022-07-22 PROCEDURE — 85730 THROMBOPLASTIN TIME PARTIAL: CPT | Performed by: STUDENT IN AN ORGANIZED HEALTH CARE EDUCATION/TRAINING PROGRAM

## 2022-07-22 PROCEDURE — 99900035 HC TECH TIME PER 15 MIN (STAT)

## 2022-07-22 PROCEDURE — A4216 STERILE WATER/SALINE, 10 ML: HCPCS | Performed by: THORACIC SURGERY (CARDIOTHORACIC VASCULAR SURGERY)

## 2022-07-22 RX ORDER — LEVETIRACETAM 500 MG/5ML
2000 INJECTION, SOLUTION, CONCENTRATE INTRAVENOUS ONCE
Status: COMPLETED | OUTPATIENT
Start: 2022-07-22 | End: 2022-07-22

## 2022-07-22 RX ORDER — LEVETIRACETAM 500 MG/1
1000 TABLET ORAL 2 TIMES DAILY
Status: DISCONTINUED | OUTPATIENT
Start: 2022-07-22 | End: 2022-07-28 | Stop reason: HOSPADM

## 2022-07-22 RX ORDER — FUROSEMIDE 10 MG/ML
80 INJECTION INTRAMUSCULAR; INTRAVENOUS 2 TIMES DAILY
Status: DISCONTINUED | OUTPATIENT
Start: 2022-07-22 | End: 2022-07-23

## 2022-07-22 RX ADMIN — INSULIN ASPART 8 UNITS: 100 INJECTION, SOLUTION INTRAVENOUS; SUBCUTANEOUS at 01:07

## 2022-07-22 RX ADMIN — CEFEPIME 1 G: 1 INJECTION, POWDER, FOR SOLUTION INTRAMUSCULAR; INTRAVENOUS at 06:07

## 2022-07-22 RX ADMIN — Medication 10 ML: at 12:07

## 2022-07-22 RX ADMIN — GABAPENTIN 100 MG: 100 CAPSULE ORAL at 03:07

## 2022-07-22 RX ADMIN — ACETAMINOPHEN 650 MG: 325 TABLET ORAL at 10:07

## 2022-07-22 RX ADMIN — ACETAMINOPHEN 650 MG: 325 TABLET ORAL at 06:07

## 2022-07-22 RX ADMIN — Medication 10 ML: at 05:07

## 2022-07-22 RX ADMIN — FUROSEMIDE 80 MG: 10 INJECTION, SOLUTION INTRAMUSCULAR; INTRAVENOUS at 12:07

## 2022-07-22 RX ADMIN — FAMOTIDINE 40 MG: 20 TABLET ORAL at 08:07

## 2022-07-22 RX ADMIN — POTASSIUM CHLORIDE 40 MEQ: 20 TABLET, EXTENDED RELEASE ORAL at 10:07

## 2022-07-22 RX ADMIN — FUROSEMIDE 80 MG: 10 INJECTION, SOLUTION INTRAMUSCULAR; INTRAVENOUS at 10:07

## 2022-07-22 RX ADMIN — DOBUTAMINE 2.5 MCG/KG/MIN: 12.5 INJECTION, SOLUTION, CONCENTRATE INTRAVENOUS at 11:07

## 2022-07-22 RX ADMIN — INSULIN DETEMIR 22 UNITS: 100 INJECTION, SOLUTION SUBCUTANEOUS at 10:07

## 2022-07-22 RX ADMIN — WARFARIN SODIUM 5 MG: 5 TABLET ORAL at 05:07

## 2022-07-22 RX ADMIN — TIZANIDINE 2 MG: 2 TABLET ORAL at 10:07

## 2022-07-22 RX ADMIN — BUSPIRONE HYDROCHLORIDE 7.5 MG: 5 TABLET ORAL at 06:07

## 2022-07-22 RX ADMIN — Medication 324 MG: at 08:07

## 2022-07-22 RX ADMIN — TIZANIDINE 2 MG: 2 TABLET ORAL at 06:07

## 2022-07-22 RX ADMIN — LEVETIRACETAM 1000 MG: 500 TABLET, FILM COATED ORAL at 10:07

## 2022-07-22 RX ADMIN — GABAPENTIN 100 MG: 100 CAPSULE ORAL at 10:07

## 2022-07-22 RX ADMIN — INSULIN ASPART 1 UNITS: 100 INJECTION, SOLUTION INTRAVENOUS; SUBCUTANEOUS at 10:07

## 2022-07-22 RX ADMIN — FUROSEMIDE 30 MG/HR: 10 INJECTION, SOLUTION INTRAMUSCULAR; INTRAVENOUS at 06:07

## 2022-07-22 RX ADMIN — MAGNESIUM SULFATE HEPTAHYDRATE 2 G: 2 INJECTION, SOLUTION INTRAVENOUS at 01:07

## 2022-07-22 RX ADMIN — Medication 10 ML: at 01:07

## 2022-07-22 RX ADMIN — MIRTAZAPINE 15 MG: 15 TABLET, FILM COATED ORAL at 10:07

## 2022-07-22 RX ADMIN — CEFEPIME 1 G: 1 INJECTION, POWDER, FOR SOLUTION INTRAMUSCULAR; INTRAVENOUS at 12:07

## 2022-07-22 RX ADMIN — GABAPENTIN 100 MG: 100 CAPSULE ORAL at 08:07

## 2022-07-22 RX ADMIN — SPIRONOLACTONE 25 MG: 25 TABLET, FILM COATED ORAL at 08:07

## 2022-07-22 RX ADMIN — LEVETIRACETAM 2000 MG: 100 INJECTION, SOLUTION, CONCENTRATE INTRAVENOUS at 03:07

## 2022-07-22 RX ADMIN — Medication 10 ML: at 06:07

## 2022-07-22 RX ADMIN — POTASSIUM CHLORIDE 40 MEQ: 20 TABLET, EXTENDED RELEASE ORAL at 03:07

## 2022-07-22 RX ADMIN — TIZANIDINE 2 MG: 2 TABLET ORAL at 01:07

## 2022-07-22 RX ADMIN — FUROSEMIDE 30 MG/HR: 10 INJECTION, SOLUTION INTRAMUSCULAR; INTRAVENOUS at 12:07

## 2022-07-22 RX ADMIN — POTASSIUM CHLORIDE 40 MEQ: 20 TABLET, EXTENDED RELEASE ORAL at 08:07

## 2022-07-22 RX ADMIN — INSULIN ASPART 10 UNITS: 100 INJECTION, SOLUTION INTRAVENOUS; SUBCUTANEOUS at 12:07

## 2022-07-22 RX ADMIN — VANCOMYCIN HYDROCHLORIDE 1250 MG: 1.25 INJECTION, POWDER, LYOPHILIZED, FOR SOLUTION INTRAVENOUS at 04:07

## 2022-07-22 RX ADMIN — ACETAMINOPHEN 650 MG: 325 TABLET ORAL at 01:07

## 2022-07-22 NOTE — PROGRESS NOTES
Pharmacokinetic Assessment Follow Up: IV Vancomycin     Antimicrobial therapy with vancomycin discontinued.   Vancomycin dosing by pharmacy consult will sign-off.   Please reconsult if necessary.   Thank you for allowing us to participate in this patient's care.      Thank you for the consult,   Yany Perkins, PharmD, [facility]  Heart Transplant Clinical Specialist   Spectralink: [id number]

## 2022-07-22 NOTE — ASSESSMENT & PLAN NOTE
-No post ictal period  -Appreciate Neurology consult.  EEG ordered for 1 hour.  Study was done; report pending   -orthostatic BP negative  -device interrogation negative

## 2022-07-22 NOTE — PROGRESS NOTES
07/22/2022  Casey Arizmendi    Current provider:  Angela Yang DO    Device interrogation:  TXP LVAD INTERROGATIONS 7/22/2022 7/22/2022 7/22/2022 7/22/2022 7/21/2022 7/21/2022 7/21/2022   Type HeartMate3 HeartMate3 HeartMate3 HeartMate3 HeartMate3 HeartMate3 HeartMate3   Flow 4.0 4.1 4 3.7 3.6 4.0 4.0   Speed 5100 5100 5100 5100 5100 5100 5100   PI 5.2 4.6 4.6 5.7 6.2 5.3 5.4   Power (Serna) 3.6 3.8 3.7 3.6 3.7 3.7 3.6   LSL - 4700 4700 4700 4700 4700 4700   Pulsatility - - - - - Intermittent pulse Intermittent pulse          Rounded on Kevan Queen to ensure all mechanical assist device settings (IABP or VAD) were appropriate and all parameters were within limits.  I was able to ensure all back up equipment was present, the staff had no issues, and the Perfusion Department daily rounding was complete.      For implantable VADs: Interrogation of Ventricular assist device was performed with analysis of device parameters and review of device function. I have personally reviewed the interrogation findings and agree with findings as stated.     In emergency, the nursing units have been notified to contact the perfusion department either by:  Calling [phone] from 630am to 4pm Mon thru Fri, utilizing the On-Call Finder functionality of Epic and searching for Perfusion, or by contacting the hospital  from 4pm to 630am and on weekends and asking to speak with the perfusionist on call.    2:40 PM

## 2022-07-22 NOTE — PLAN OF CARE
Kevan Queen is a 37 year-old male with a history of non-ischemic cardiomyopathy s/p LVAD placement on 6/29, who was evaluated by neurology for seizure-like activity. Seizure like episodes have occurred twice, and involved loss of awareness with generalized convulsions, with patient leaning to the right both times. These were both observed by his wife. Neurology decided to obtain an EEG with monitoring >1 hour to assess for presence of seizure focus. EEG demonstrated a left temporal seizure focus. MRI brain is contraindicated due to LVAD. Recommendations are as follows.    Recommendations  -please start Keppra: 2000mg iv loading dose followed by 1000mg po BID  -follow up outpatient with epilepsy in clinic  -seizure precautions: please do not drive until you are 6 months seizure free; please do not take baths, showers are preferred, please avoid swimming, operating heavy machinery, caring for children alone, and be careful with activities that involve using hot surfaces such as cooking

## 2022-07-22 NOTE — PROGRESS NOTES
Pt aaox3 while sitting up in bed with simeon Page at bedside.  LVAD history interrogated with no abnormal events noted.  LVAD numbers WNL. Approximately 30minutes was spent with the patient providing education. Pt denies any needs at this time.   Patient educated on below information:   VAD Binder given to patient to fill out. Reviewed with patient the workbook, encouraged patient to work through while learning. Also showed patient their Log Sheets, Educated on taking vital signs everyday and recording the results, recording the Coumadin level and dosages everyday, taking weight and monitoring daily, assisted patient in filling out today's information.   : Reviewed in depth the . Patient was shown the display button, silence button, and battery button and what each button does including silencing alarms, checking battery status, and toggle through LCD screen. Reviewed how to preform self  test and review last 6 alarms. Also reviewed with patient the lights and sounds the controller makes during alarms   MODULAR CABLE/ DLES: Reviewed with patient their dressing change information and how to rate their DLES. Reviewed the importance of using an anchor and properly wearing equipment. Also reviewed hen patient will be able to shower again and stitch removal. Showed patient how to turn modular cable and properly care for as well as explained why patient needs to properly care for equipment.   EMERGENCY BAG: Reviewed with patient the importance of emergency bag. Showed patient what should be in the bag at all times including backup controller, 2 batteries, 2 clips, and emergency cards. Reviewed that patient should have bag with them at all times.   BATTERIES AND : Reviewed with patient the battery charger, batteries, and battery clips. Patient was shown how to properly charge batteries, discussed battery rotation, and proper connection on battery clips. Demonstrated how to  check battery level on both battery and . Reviewed  alarms and lights and how to troubleshoot issues. Reviewed how long batteries last and calibration.    MPU: Reviewed with patient their Mobile Power Unit (MPU). Patient was shown how to connect to unit, educated on what patient should keep next to their bed and why-- emergency bag and flashlight, Reviewed what lights and alarms on MPU mean and how to troubleshoot. Explained never switching between MPU and power module. Explained if patient is to get an alarm to switch over to batteries immediatly.    ALARMS: Reviewed all alarms with patient. Explained and demonstrated red heart, red battery, driveline disconnect, yellow wrench, and yellow kia alarms. Had patient trouble shoot different situations and provide the solutions to the alarms. Had patient explain what could be happening when the alarm sounds.   CONTROLLER EXCHANGE was practiced with patient. Reasons for controller exchange reviewed along with instructions on how to occur. Patient Instructed to never preform exchange with out contacting a coordinator and that the patient should not be alone when exchanging.   PAGING VS CALLING: Educated patient on Contact information sheet. Thoroughly explained the difference between Paging vs. Calling the coordinators. Patient practiced paging Coordinator.   DEVICE PARTS: Educated patient on Pieces of the LVAD device. Reviewed , modular cable, tether cable, batteries and clips, battery charger, and MPU.   ACTIVITIES: Reviewed with patient activities they may participate in including intimacy, driving, traveling, severe weather, and exercising. Also reviewed with patient what they should do in an emergency situation. Explained all precautions patient needs to take during these situation.  Pt verbalized understanding.   Encouraged pt to notify nurse if they have any questions, problems or concerns for LVAD coordinator.  Pt verbalized  understanding and in agreement of plan. Will follow up with pt soon.    PATIENT IS CHECKED OFF ON ALARMS  CAREGIVER Page IS NOT CHECKED OFF ON DRESSING CHANGE

## 2022-07-22 NOTE — ASSESSMENT & PLAN NOTE
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  - HTS primary   - GASTON trial   - INR was supratherapeuitc and coumadin was held. Goal 2.0-3.0 INR is 2.0 today.  Pharmacy to dose   - LDH stable  - CVP 9, SVO2: 55, CO: 6.32, CI: 2.30, SVR: 759. Will stop Lasix drip and transition to IVP.  When transitioning to po; only option is Lasix (he has had hives with oral Bumex and Torsemide)  -RHC with speed 5100 RA: 14, RV: 41/8 (14), PA: 42/17 (25), PWP: 16/17 (15), CO: 5.32, CI: 2.51  - ECHO 7/19 with speed at 5100- EF: 10%, LVEDD: 6mc (up from 4.24 with speed at 5200)      Procedure: Device Interrogation Including analysis of device parameters  Current Settings: Ventricular Assist Device  Review of device function is stable    TXP LVAD INTERROGATIONS 7/22/2022 7/22/2022 7/22/2022 7/21/2022 7/21/2022 7/21/2022 7/21/2022   Type HeartMate3 HeartMate3 HeartMate3 HeartMate3 HeartMate3 HeartMate3 HeartMate3   Flow 4.1 4 3.7 3.6 4.0 4.0 4.3   Speed 5100 5100 5100 5100 5100 5100 5100   PI 4.6 4.6 5.7 6.2 5.3 5.4 3.3   Power (Serna) 3.8 3.7 3.6 3.7 3.7 3.6 3.5   LSL 4700 4700 4700 4700 4700 4700 4700   Pulsatility - - - - Intermittent pulse Intermittent pulse Intermittent pulse

## 2022-07-22 NOTE — PLAN OF CARE
Pt maintained free from falls/trauma/injuries and skin breakdown. Pt c/o moderate incisional chest pain and scheduled tylenol and PRN oxy given and helped. Dobu going at 2.5 mcg/kg/min. Lasix going at 30 mg/hr. CVP of 9 and SvO2 obtained. Lab drawn. IV abx given. Mag replaced. Plan of care reviewed. Pt verbalized understanding. All questions and concerns addressed. Will continue to monitor.

## 2022-07-22 NOTE — PROCEDURES
EEG REPORT      Kevan Queen  [id number]  1985    DATE OF SERVICE: 7/21/2022         METHODOLOGY      Extended electroencephalographic recording is made while the patient is ambulatory and continuing normal daily activities.  Electrodes are placed according to the International 10-20 placement system and included T1 and T2 electrode placement.  Twenty four (24) channels of digital signal (sampling rate of 512/sec) was simultaneously recorded from the scalp including EKG and eye monitors.  Recording band pass was 0.1 to 100 hz and all data was stored digitally on the recorder.  The patient is instructed to press an event button when clinical symptoms occur and write the symptoms into a diary. Activation procedures which include photic stimulation, hyperventilation and instructing patients to perform simple task are done in selected patients.        The EEG is displayed on a monitor screen and can be reformatted into different montages for evaluation.  The entire recoding is submitted for computer assisted analysis to detect spike and electrographic seizure activity.  The entire recording is visually reviewed and the times identified by computer analysis as being spikes or seizures are reviewed again.  Compresses spectral analysis (CSA) is also performed on the activity recorded from each individual channel.  This is displayed as a power display of frequencies from 0 to 30 Hz over time.   The CSA analysis is done and displayed continuously.  This is reviewed for asymmetries in power between homologous areas of the scalp and for presence of changes in power which canbe seen when seizures occur.  Sections of suspected abnormalities on the CSA is then compared with the original EEG recording.  .     Baidu software was also utilized in the review of this study.  This software suite analyzes the EEG recording in multiple domains.  Coherence and rhythmicity is computed to identify EEG sections which may  contain organized seizures.  Each channel undergoes analysis to detect presence of spike and sharp waves which have special and morphological characteristic of epileptic activity.  The routine EEG recording is converted from spacial into frequency domain.  This is then displayed comparing homologous areas to identify areas of significant asymmetry.  Algorithm to identify non-cortically generated artifact is used to separate eye movement, EMG and other artifact from the EEG     Recording Times    A total of 1:31:20 hours of EEG was recorded.      EEG FINDINGS:  Background activity:   The background rhythm was characterized by alpha and anterior dominant beta activity with a 10Hz posterior dominant alpha rhythm at 30-70 microvolts.   Symmetry and continuity: the background was continuous and symmetric     Sleep:   No sleep transients were seen.    Activation procedures:   Answers questions correctly    Abnormal activity:   There is occasional left temporal intermittent rhythmic delta activity (TIRDA).    IMPRESSION:   Abnormal EEG due to left temporal seizure focus.  No electrographic seizures.      Haider Devi MD  Neurology-Epilepsy.  [facility].

## 2022-07-22 NOTE — ASSESSMENT & PLAN NOTE
- NIDCM.  - Was not evaluated for OHTx as he quit smoking in April 2022.   -Diuresed well on Lasix drip; transitioning to IVP today.  Plan to transition to po Lasix over the next couple days.  (has had allergic reactions to po Bumex and Torsemide)  - GDMT: Spironolactone   - See LVAD

## 2022-07-22 NOTE — CARE UPDATE
Care Update:     No acute events overnight. Patient on the CSU in room 316/316 A. Blood glucose stable. BG at goal on current insulin regimen (SSI, prandial, and basal insulin ). Steroid use- None. 1 Day Post-Op  Renal function- Normal   Vasopressors-  None       Diet Cardiac [facility]; Consistent Carbohydrate; Fluid - 1500mL     POCT Glucose   Date Value Ref Range Status   07/22/2022 168 (H) 70 - 110 mg/dL Final   07/21/2022 130 (H) 70 - 110 mg/dL Final   07/21/2022 155 (H) 70 - 110 mg/dL Final   07/21/2022 121 (H) 70 - 110 mg/dL Final   07/21/2022 140 (H) 70 - 110 mg/dL Final   07/20/2022 171 (H) 70 - 110 mg/dL Final   07/20/2022 200 (H) 70 - 110 mg/dL Final   07/20/2022 136 (H) 70 - 110 mg/dL Final   07/20/2022 151 (H) 70 - 110 mg/dL Final   07/19/2022 155 (H) 70 - 110 mg/dL Final   07/19/2022 126 (H) 70 - 110 mg/dL Final   07/19/2022 158 (H) 70 - 110 mg/dL Final     Lab Results   Component Value Date    HGBA1C 9.2 (H) 05/20/2022       Endocrinology consulted for BG management.   BG goal 140-180    Diabetes Medications             insulin aspart U-100 (NOVOLOG) 100 unit/mL (3 mL) InPn pen Inject 12 Units into the skin 3 (three) times daily.    insulin detemir U-100 (LEVEMIR FLEXTOUCH) 100 unit/mL (3 mL) SubQ InPn pen Inject 23 Units into the skin once daily.      Hospital Medications   BG checks AC/HS            glucagon (human recombinant) injection 1 mg 1 mg, Intramuscular, As needed (PRN), Turn patient on their side, give IM, and NOTIFY MD IMMEDIATELY.<BR><BR>Feed the patient as soon as patient awakens and is able to swallow.    glucose chewable tablet 16 g 16 g, Oral, As needed (PRN), (16 grams = #  four 4gm glucose tablets)    glucose chewable tablet 24 g 24 g, Oral, As needed (PRN), (24 grams = # six 4gm glucose tablets)    insulin aspart U-100 pen 1-10 Units 1-10 Units, Subcutaneous, Before meals &amp; nightly PRN, **MODERATE CORRECTION DOSE**<BR>Blood Glucose<BR>mg/dL                  Pre-meal                 2200<BR>151-200                2 units                    1 unit<BR>201-250                4 units                    2 units  <BR>251-300                6 units                    3 units  <BR>301-350                8 units                    4 units <BR>&gt;350                     10 units                  5 units<BR>Administer subcutaneously if needed at times designated by monitoring schedule. <BR>DO NOT HOLD correction dose insulin for patients who are  NPO.<BR>&quot;HIGH ALERT MEDICATION&quot; - Administer with meals or TF/TPN.    insulin aspart U-100 pen 10 Units 10 Units, Subcutaneous, 3 times daily with meals, Administer subcutaneously with meals. HOLD prandial (mealtime) insulin if patient is NPO, unable to eat, or if Blood Glucose less than 70 mg/dL.<BR><BR>If patient ate prior to BG check, administer scheduled Novolog only (do not cover with correction dose at this time).<BR>&quot;HIGH ALERT MEDICATION&quot; - Administer with meals or TF/TPN.    insulin aspart U-100 pen 4 Units 4 Units, Subcutaneous, As needed (PRN), Administer subcutaneously with meal. HOLD prandial (mealtime) insulin if patient is NPO, unable to eat, or if Blood Glucose less than 70 mg/dL.<BR><BR>If patient ate prior to BG check, administer scheduled Novolog only (do not cover with correction dose at this time).<BR>&quot;HIGH ALERT MEDICATION&quot; - Administer with meals or TF/TPN.    insulin detemir U-100 pen 22 Units 22 Units, Subcutaneous, Nightly, DO NOT HOLD basal insulin when patient is NPO.<BR>If  Blood Glucose is less than 100 mg/dL at BEDTIME, give 16 grams (four glucose tablets) X 1 dose. Recheck BG in 30 minutes and call MD for insulin dose adjustments prior to administering insulin detemir (Levemir).<BR><BR>&quot;HIGH ALERT MEDICATION&quot;            ** Please notify Endocrine for any change and/or advance in diet**  ** Please call Endocrine for any BG related issues **    Discharge Planning:   TBD. Please  notify endocrinology prior to discharge.      Michael Wyman DNP, FNP-C  Department of Endocrinology  Inpatient Glycemic Management

## 2022-07-22 NOTE — ASSESSMENT & PLAN NOTE
-on empiric Vanc and Cefepime for today of 5 days (end date 7/23)  -ID consulted appreciate their recommendations  -WBC improved

## 2022-07-22 NOTE — PROGRESS NOTES
[facility] - Cardiology Stepdown  Heart Transplant  Progress Note    Patient Name: Kevan Queen  MRN: [id number]  Admission Date: 6/13/2022  Hospital Length of Stay: 39 days  Attending Physician: Angela Yang DO  Primary Care Provider: ORALIA Cline  Principal Problem:LVAD (left ventricular assist device) present    Subjective:     Interval History: EEG done yesterday report pending.  He hasn't had any new events.  RHC done yesterday RA: 14, PAP: 42/17 (25), PWP: 16/17 (15), CO: 5.32, CI: 2.51.  Creatinine up today at 1.5 so will stop Lasix drip and transition to IVP Lasix with plan to transition to po in the next couple days.  Patient does not tolerate oral Bumex and Torsemide (it gives him hives) so only oral option is Lasix.  CVP: 9, SVO2: 55, CO: 6.32, CI: 2.30, SVR: 759.    Continuous Infusions:   sodium chloride 0.9% 5 mL/hr at 06/27/22 0055    sodium chloride 0.9% Stopped (07/18/22 2000)    DOBUTamine IV infusion (non-titrating) 2.5 mcg/kg/min (07/21/22 0839)     Scheduled Meds:   acetaminophen  650 mg Oral Q8H    busPIRone  7.5 mg Oral Daily    ceFEPime (MAXIPIME) IVPB  1 g Intravenous Q8H    famotidine  40 mg Oral Daily    ferrous gluconate  324 mg Oral Daily with breakfast    furosemide (LASIX) injection  80 mg Intravenous BID    gabapentin  100 mg Oral TID    insulin aspart U-100  10 Units Subcutaneous TIDWM    insulin detemir U-100  22 Units Subcutaneous QHS    INV aspirin/placebo  100 mg Oral Daily    mirtazapine  15 mg Oral Nightly    polyethylene glycol  17 g Oral Daily    potassium chloride  40 mEq Oral TID    sodium chloride 0.9%  10 mL Intravenous Q6H    spironolactone  25 mg Oral Daily    tiZANidine  2 mg Oral Q8H    vancomycin (VANCOCIN) IVPB  1,250 mg Intravenous Q12H    warfarin  5 mg Oral Daily     PRN Meds:albuterol sulfate, bisacodyL, dextrose 10%, dextrose 10%, docusate sodium, glucagon (human recombinant), glucose, glucose, insulin aspart U-100, insulin  aspart U-100, LIDOcaine HCL 20 mg/ml (2%), magnesium sulfate IVPB, ondansetron, oxyCODONE, polyethylene glycol, promethazine (PHENERGAN) IVPB, sodium chloride 0.9%, sodium chloride 0.9%, Flushing PICC Protocol **AND** sodium chloride 0.9% **AND** sodium chloride 0.9%, sodium chloride 0.9%, traMADoL, Pharmacy to dose Vancomycin consult **AND** vancomycin - pharmacy to dose    Review of patient's allergies indicates:   Allergen Reactions    Aspirin Other (See Comments)     Mr. Thacker is enrolled in Dr. Paige's Alon Trial and cannot have any aspirin and/or aspirin-containing products. DO NOT cancel any orders for INV Aspirin 100 mg/Placebo. If you have questions, please contact Isabel @ [phone], [phone],[email or web address], secure chat or MS Teams message.    Bumex [bumetanide] Hives    Lactose Diarrhea     Other reaction(s): Abdominal distension, gaseous    Torsemide Hives     Objective:     Vital Signs (Most Recent):  Temp: 97.8 °F (36.6 °C) (07/22/22 1120)  Pulse: 105 (07/22/22 1120)  Resp: 20 (07/22/22 1120)  BP: (!) 102/59 (07/22/22 1120)  SpO2: 99 % (07/22/22 0728)   Vital Signs (24h Range):  Temp:  [97.2 °F (36.2 °C)-98.3 °F (36.8 °C)] 97.8 °F (36.6 °C)  Pulse:  [] 105  Resp:  [12-20] 20  SpO2:  [97 %-99 %] 99 %  BP: ()/(0-76) 102/59     Patient Vitals for the past 72 hrs (Last 3 readings):   Weight   07/22/22 0728 82.7 kg (182 lb 5.1 oz)   07/21/22 0825 83.9 kg (184 lb 15.5 oz)   07/20/22 0800 84.1 kg (185 lb 6.5 oz)       Body mass index is 22.79 kg/m².      Intake/Output Summary (Last 24 hours) at 7/22/2022 1129  Last data filed at 7/22/2022 0446  Gross per 24 hour   Intake 462 ml   Output 3150 ml   Net -2688 ml       Hemodynamic Parameters: CVP 11       Telemetry: ST    Physical Exam  Vitals and nursing note reviewed.   Constitutional:       Appearance: Normal appearance.   HENT:      Head: Normocephalic and atraumatic.   Eyes:      Extraocular Movements: Extraocular movements  intact.      Conjunctiva/sclera: Conjunctivae normal.   Cardiovascular:      Rate and Rhythm: Regular rhythm. Tachycardia present.      Comments: Smooth VAD hum  Pulmonary:      Breath sounds: Normal breath sounds.   Abdominal:      Palpations: Abdomen is soft.   Musculoskeletal:         General: No swelling.      Cervical back: Neck supple.      Right lower leg: No edema.      Left lower leg: No edema.   Skin:     General: Skin is dry.      Capillary Refill: Capillary refill takes 2 to 3 seconds.   Neurological:      General: No focal deficit present.      Mental Status: He is alert and oriented to person, place, and time.      Motor: No weakness.   Psychiatric:         Mood and Affect: Mood normal.         Behavior: Behavior normal.         Thought Content: Thought content normal.         Judgment: Judgment normal.       Significant Labs:  CBC:  Recent Labs   Lab 07/19/22  0228 07/20/22  1215 07/22/22  0424   WBC 20.07* 12.44 10.69   RBC 2.99* 3.10* 3.28*   HGB 7.4* 7.8* 8.2*   HCT 23.7* 25.9* 26.3*    459* 514*   MCV 79* 84 80*   MCH 24.7* 25.2* 25.0*   MCHC 31.2* 30.1* 31.2*       BNP:  Recent Labs   Lab 07/18/22  0311 07/20/22  0358 07/22/22  0424   * 496* 318*       CMP:  Recent Labs   Lab 07/20/22  0358 07/20/22  1215 07/20/22  1749 07/21/22  0430 07/21/22  1743 07/22/22  0424   * 115*  --  138*  --  171*   CALCIUM 9.3 9.6  --  9.5  --  9.5   ALBUMIN 2.9*  --   --  3.0*  --  3.1*   PROT 8.0  --   --  8.2  --  8.4   * 136  --  138  --  137   K 2.7*  2.7* 4.2   < > 3.4*  3.4* 3.6 3.4*  3.4*   CO2 27 27  --  27  --  28   CL 96 99  --  98  --  98   BUN 15 15  --  12  --  12   CREATININE 1.3 1.4  --  1.3  --  1.5*   ALKPHOS 145*  --   --  140*  --  146*   ALT 17  --   --  16  --  16   AST 28  --   --  27  --  24   BILITOT 0.9  --   --  1.0  --  1.1*    < > = values in this interval not displayed.        Coagulation:   Recent Labs   Lab 07/20/22  0358 07/21/22  0430 07/22/22  0424  "  INR 2.6* 1.8* 2.0*   APTT 34.5* 29.3 27.3       LDH:  Recent Labs   Lab 07/20/22  0358 07/21/22  0430 07/22/22  0424   * 285* 266*       Microbiology:  Microbiology Results (last 7 days)       Procedure Component Value Units Date/Time    Blood culture [[id number]] Collected: 07/19/22 0006    Order Status: Completed Specimen: Blood from Peripheral, Antecubital, Left Updated: 07/22/22 0612     Blood Culture, Routine No Growth to date      No Growth to date      No Growth to date      No Growth to date            I have reviewed all pertinent labs within the past 24 hours.    Estimated Creatinine Clearance: 78.9 mL/min (A) (based on SCr of 1.5 mg/dL (H)).    Diagnostic Results:  I have reviewed and interpreted all pertinent imaging results/findings within the past 24 hours.    Assessment and Plan:     36 yo male with NIDCM with BiV systolic heart failure, on home Milrinone at 0.375 mcg/kg/min, presented at [facility] 4/2/22 ,was not evaluated for OHT as he has recently quit smoking in April 2022 but was approved for VAD with plan to begin OHT evaluation in upcoming months if Mr Queen is stable and suitable for OHT eval (blood group A), 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 Went to ED at [facility] 6/10 but left after waiting 4 hours. Had clinic appointment with us today, but arrived to [facility] early this morning and decided to go to the ED instead. Baseline Lasix dose is 80 mg bid. Reports taking 240 mg qd past 3 days with no improvement. BNP is 1701, up from 898 on 6/2 and 49 on 5/24. sCr is 1.8 with baseline ~ 1.5-1.7. sPO2 on RA is 93%. Wife at bedside    He has been given Lasix 80 mg " IVP in the ED with plan to start Lasix gtt at 20 mg/hr           * LVAD (left ventricular assist device) present  - S/P DT HM3 with MVR plus Protek Duo for RV support 6/29 (Grecia)  - RVAD removed with concern for hemolysis.  - HTS primary   - GASTON trial   - INR was supratherapeuitc and coumadin was held. Goal 2.0-3.0 INR is 2.0 today.  Pharmacy to dose   - LDH stable  - CVP 9, SVO2: 55, CO: 6.32, CI: 2.30, SVR: 759. Will stop Lasix drip and transition to IVP.  When transitioning to po; only option is Lasix (he has had hives with oral Bumex and Torsemide)  -RHC with speed 5100 RA: 14, RV: 41/8 (14), PA: 42/17 (25), PWP: 16/17 (15), CO: 5.32, CI: 2.51  - ECHO 7/19 with speed at 5100- EF: 10%, LVEDD: 6mc (up from 4.24 with speed at 5200)      Procedure: Device Interrogation Including analysis of device parameters  Current Settings: Ventricular Assist Device  Review of device function is stable    TXP LVAD INTERROGATIONS 7/22/2022 7/22/2022 7/22/2022 7/21/2022 7/21/2022 7/21/2022 7/21/2022   Type HeartMate3 HeartMate3 HeartMate3 HeartMate3 HeartMate3 HeartMate3 HeartMate3   Flow 4.1 4 3.7 3.6 4.0 4.0 4.3   Speed 5100 5100 5100 5100 5100 5100 5100   PI 4.6 4.6 5.7 6.2 5.3 5.4 3.3   Power (Serna) 3.8 3.7 3.6 3.7 3.7 3.6 3.5   LSL 4700 4700 4700 4700 4700 4700 4700   Pulsatility - - - - Intermittent pulse Intermittent pulse Intermittent pulse       Acute on chronic combined systolic and diastolic congestive heart failure, NYHA class 4  - NIDCM.  - Was not evaluated for OHTx as he quit smoking in April 2022.   -Diuresed well on Lasix drip; transitioning to IVP today.  Plan to transition to po Lasix over the next couple days.  (has had allergic reactions to po Bumex and Torsemide)  - GDMT: Spironolactone   - See LVAD    Staphylococcus epidermidis bacteremia  - Blood cultures 6/20 and repeat 6/21 positive for Staph Epi. One of two blood cultures from 6/23 positive for Staph (taken prior to line exchange). Repeat cultures  sent 6/25 NGTD  - IJ exchanged on 6/23, IABP exchanged 6/23  - ID recommended 14 day course of vancomycin from VAD implantation on 6/29 with end date: 7/12/22. Vancomycin discontinued per CTS with concerns for elevated sCr. ID with new recs to complete 7d course of daptomycin, which was completed 7/7/22    CKD (chronic kidney disease) stage 3, GFR 30-59 ml/min  SCr baseline ~ 1.5-1.7    Leukocytosis  -on empiric Vanc and Cefepime for today of 5 days (end date 7/23)  -ID consulted appreciate their recommendations  -WBC improved    Hyponatremia  -Hypervolemic, now improved  -Discontinued salt tablets   -Improvement in Na with diuresis    Shaking  -No post ictal period  -Appreciate Neurology consult.  EEG ordered for 1 hour.  Study was done; report pending   -orthostatic BP negative  -device interrogation negative       Uncontrolled diabetes mellitus  Admitted 5/24-5/27 with hyperglycemia hyperosmolar syndrome  - Hgb A1C 9.2 on 5/20/22  - Endocrine following, on insulin gtt    Tingling in extremities  - Pt reports paresthesias in right toes and fingers x 3 days   - Possible radiculopathy vs electrolyte abnormality vs neurovascular etiology   - Consulted General Neurology, suspect compression from surgical positioning/carpal tunnel syndrome. Recommended brace to L hand/wrist. If no improvement, will need outpatient EMG/NCS      DEYA Martinez  Heart Transplant  [facility] - Cardiology Stepdown

## 2022-07-22 NOTE — SUBJECTIVE & OBJECTIVE
Interval History: EEG done yesterday report pending.  He hasn't had any new events.  RHC done yesterday RA: 14, PAP: 42/17 (25), PWP: 16/17 (15), CO: 5.32, CI: 2.51.  Creatinine up today at 1.5 so will stop Lasix drip and transition to IVP Lasix with plan to transition to po in the next couple days.  Patient does not tolerate oral Bumex and Torsemide (it gives him hives) so only oral option is Lasix.  CVP: 9, SVO2: 55, CO: 6.32, CI: 2.30, SVR: 759.    Continuous Infusions:   sodium chloride 0.9% 5 mL/hr at 06/27/22 0055    sodium chloride 0.9% Stopped (07/18/22 2000)    DOBUTamine IV infusion (non-titrating) 2.5 mcg/kg/min (07/21/22 0839)     Scheduled Meds:   acetaminophen  650 mg Oral Q8H    busPIRone  7.5 mg Oral Daily    ceFEPime (MAXIPIME) IVPB  1 g Intravenous Q8H    famotidine  40 mg Oral Daily    ferrous gluconate  324 mg Oral Daily with breakfast    furosemide (LASIX) injection  80 mg Intravenous BID    gabapentin  100 mg Oral TID    insulin aspart U-100  10 Units Subcutaneous TIDWM    insulin detemir U-100  22 Units Subcutaneous QHS    INV aspirin/placebo  100 mg Oral Daily    mirtazapine  15 mg Oral Nightly    polyethylene glycol  17 g Oral Daily    potassium chloride  40 mEq Oral TID    sodium chloride 0.9%  10 mL Intravenous Q6H    spironolactone  25 mg Oral Daily    tiZANidine  2 mg Oral Q8H    vancomycin (VANCOCIN) IVPB  1,250 mg Intravenous Q12H    warfarin  5 mg Oral Daily     PRN Meds:albuterol sulfate, bisacodyL, dextrose 10%, dextrose 10%, docusate sodium, glucagon (human recombinant), glucose, glucose, insulin aspart U-100, insulin aspart U-100, LIDOcaine HCL 20 mg/ml (2%), magnesium sulfate IVPB, ondansetron, oxyCODONE, polyethylene glycol, promethazine (PHENERGAN) IVPB, sodium chloride 0.9%, sodium chloride 0.9%, Flushing PICC Protocol **AND** sodium chloride 0.9% **AND** sodium chloride 0.9%, sodium chloride 0.9%, traMADoL, Pharmacy to dose Vancomycin consult **AND** vancomycin - pharmacy to  dose    Review of patient's allergies indicates:   Allergen Reactions    Aspirin Other (See Comments)     Mr. Thacker is enrolled in Dr. Paige's Alon Trial and cannot have any aspirin and/or aspirin-containing products. DO NOT cancel any orders for INV Aspirin 100 mg/Placebo. If you have questions, please contact Isabel @ [phone], [phone],[email or web address], secure chat or MS Teams message.    Bumex [bumetanide] Hives    Lactose Diarrhea     Other reaction(s): Abdominal distension, gaseous    Torsemide Hives     Objective:     Vital Signs (Most Recent):  Temp: 97.8 °F (36.6 °C) (07/22/22 1120)  Pulse: 105 (07/22/22 1120)  Resp: 20 (07/22/22 1120)  BP: (!) 102/59 (07/22/22 1120)  SpO2: 99 % (07/22/22 0728)   Vital Signs (24h Range):  Temp:  [97.2 °F (36.2 °C)-98.3 °F (36.8 °C)] 97.8 °F (36.6 °C)  Pulse:  [] 105  Resp:  [12-20] 20  SpO2:  [97 %-99 %] 99 %  BP: ()/(0-76) 102/59     Patient Vitals for the past 72 hrs (Last 3 readings):   Weight   07/22/22 0728 82.7 kg (182 lb 5.1 oz)   07/21/22 0825 83.9 kg (184 lb 15.5 oz)   07/20/22 0800 84.1 kg (185 lb 6.5 oz)       Body mass index is 22.79 kg/m².      Intake/Output Summary (Last 24 hours) at 7/22/2022 1129  Last data filed at 7/22/2022 0446  Gross per 24 hour   Intake 462 ml   Output 3150 ml   Net -2688 ml       Hemodynamic Parameters: CVP 11       Telemetry: ST    Physical Exam  Vitals and nursing note reviewed.   Constitutional:       Appearance: Normal appearance.   HENT:      Head: Normocephalic and atraumatic.   Eyes:      Extraocular Movements: Extraocular movements intact.      Conjunctiva/sclera: Conjunctivae normal.   Cardiovascular:      Rate and Rhythm: Regular rhythm. Tachycardia present.      Comments: Smooth VAD hum  Pulmonary:      Breath sounds: Normal breath sounds.   Abdominal:      Palpations: Abdomen is soft.   Musculoskeletal:         General: No swelling.      Cervical back: Neck supple.      Right lower leg: No edema.       Left lower leg: No edema.   Skin:     General: Skin is dry.      Capillary Refill: Capillary refill takes 2 to 3 seconds.   Neurological:      General: No focal deficit present.      Mental Status: He is alert and oriented to person, place, and time.      Motor: No weakness.   Psychiatric:         Mood and Affect: Mood normal.         Behavior: Behavior normal.         Thought Content: Thought content normal.         Judgment: Judgment normal.       Significant Labs:  CBC:  Recent Labs   Lab 07/19/22  0228 07/20/22  1215 07/22/22  0424   WBC 20.07* 12.44 10.69   RBC 2.99* 3.10* 3.28*   HGB 7.4* 7.8* 8.2*   HCT 23.7* 25.9* 26.3*    459* 514*   MCV 79* 84 80*   MCH 24.7* 25.2* 25.0*   MCHC 31.2* 30.1* 31.2*       BNP:  Recent Labs   Lab 07/18/22  0311 07/20/22  0358 07/22/22  0424   * 496* 318*       CMP:  Recent Labs   Lab 07/20/22  0358 07/20/22  1215 07/20/22  1749 07/21/22  0430 07/21/22  1743 07/22/22  0424   * 115*  --  138*  --  171*   CALCIUM 9.3 9.6  --  9.5  --  9.5   ALBUMIN 2.9*  --   --  3.0*  --  3.1*   PROT 8.0  --   --  8.2  --  8.4   * 136  --  138  --  137   K 2.7*  2.7* 4.2   < > 3.4*  3.4* 3.6 3.4*  3.4*   CO2 27 27  --  27  --  28   CL 96 99  --  98  --  98   BUN 15 15  --  12  --  12   CREATININE 1.3 1.4  --  1.3  --  1.5*   ALKPHOS 145*  --   --  140*  --  146*   ALT 17  --   --  16  --  16   AST 28  --   --  27  --  24   BILITOT 0.9  --   --  1.0  --  1.1*    < > = values in this interval not displayed.        Coagulation:   Recent Labs   Lab 07/20/22  0358 07/21/22  0430 07/22/22  0424   INR 2.6* 1.8* 2.0*   APTT 34.5* 29.3 27.3       LDH:  Recent Labs   Lab 07/20/22  0358 07/21/22  0430 07/22/22  0424   * 285* 266*       Microbiology:  Microbiology Results (last 7 days)       Procedure Component Value Units Date/Time    Blood culture [[id number]] Collected: 07/19/22 0006    Order Status: Completed Specimen: Blood from Peripheral, Antecubital, Left  Updated: 07/22/22 0612     Blood Culture, Routine No Growth to date      No Growth to date      No Growth to date      No Growth to date            I have reviewed all pertinent labs within the past 24 hours.    Estimated Creatinine Clearance: 78.9 mL/min (A) (based on SCr of 1.5 mg/dL (H)).    Diagnostic Results:  I have reviewed and interpreted all pertinent imaging results/findings within the past 24 hours.

## 2022-07-22 NOTE — PLAN OF CARE
Plan of care discussed with patient. Fall precautions in place. Continuing to encourage sternal precautions, IS, and ambulation. LVAD DP and numbers WNL, smooth LVAD hum. Patient has no complaints of pain. Lasix gtts transitioned to IVP.  maintained. Patient has no questions at this time. Will continue to monitor.

## 2022-07-23 PROBLEM — G40.109 TEMPORAL LOBE SEIZURE: Status: ACTIVE | Noted: 2022-07-20

## 2022-07-23 LAB
ALBUMIN SERPL BCP-MCNC: 3.3 G/DL (ref 3.5–5.2)
ALLENS TEST: ABNORMAL
ALP SERPL-CCNC: 143 U/L (ref 55–135)
ALT SERPL W/O P-5'-P-CCNC: 17 U/L (ref 10–44)
ANION GAP SERPL CALC-SCNC: 11 MMOL/L (ref 8–16)
AST SERPL-CCNC: 29 U/L (ref 10–40)
BILIRUB SERPL-MCNC: 1 MG/DL (ref 0.1–1)
BUN SERPL-MCNC: 14 MG/DL (ref 6–20)
CALCIUM SERPL-MCNC: 10 MG/DL (ref 8.7–10.5)
CHLORIDE SERPL-SCNC: 99 MMOL/L (ref 95–110)
CO2 SERPL-SCNC: 26 MMOL/L (ref 23–29)
CREAT SERPL-MCNC: 1.7 MG/DL (ref 0.5–1.4)
DELSYS: ABNORMAL
EST. GFR  (AFRICAN AMERICAN): 58.3 ML/MIN/1.73 M^2
EST. GFR  (NON AFRICAN AMERICAN): 50.4 ML/MIN/1.73 M^2
GLUCOSE SERPL-MCNC: 118 MG/DL (ref 70–110)
HCO3 UR-SCNC: 28.8 MMOL/L (ref 24–28)
HCT VFR BLD CALC: 29 %PCV (ref 36–54)
INR PPP: 3 (ref 0.8–1.2)
LDH SERPL L TO P-CCNC: 283 U/L (ref 110–260)
MAGNESIUM SERPL-MCNC: 1.8 MG/DL (ref 1.6–2.6)
MAGNESIUM SERPL-MCNC: 2.3 MG/DL (ref 1.6–2.6)
MODE: ABNORMAL
PCO2 BLDA: 47.3 MMHG (ref 35–45)
PH SMN: 7.39 [PH] (ref 7.35–7.45)
PO2 BLDA: 32 MMHG (ref 40–60)
POC BE: 4 MMOL/L
POC IONIZED CALCIUM: 1.27 MMOL/L (ref 1.06–1.42)
POC SATURATED O2: 61 % (ref 95–100)
POC TCO2: 30 MMOL/L (ref 24–29)
POCT GLUCOSE: 108 MG/DL (ref 70–110)
POCT GLUCOSE: 125 MG/DL (ref 70–110)
POTASSIUM BLD-SCNC: 3.6 MMOL/L (ref 3.5–5.1)
POTASSIUM SERPL-SCNC: 3.7 MMOL/L (ref 3.5–5.1)
POTASSIUM SERPL-SCNC: 3.7 MMOL/L (ref 3.5–5.1)
POTASSIUM SERPL-SCNC: 4.2 MMOL/L (ref 3.5–5.1)
PROT SERPL-MCNC: 8.4 G/DL (ref 6–8.4)
PROTHROMBIN TIME: 28.9 SEC (ref 9–12.5)
SAMPLE: ABNORMAL
SITE: ABNORMAL
SODIUM BLD-SCNC: 138 MMOL/L (ref 136–145)
SODIUM SERPL-SCNC: 136 MMOL/L (ref 136–145)
SP02: 97
VANCOMYCIN SERPL-MCNC: 13.3 UG/ML

## 2022-07-23 PROCEDURE — 25000003 PHARM REV CODE 250: Performed by: INTERNAL MEDICINE

## 2022-07-23 PROCEDURE — 80202 ASSAY OF VANCOMYCIN: CPT | Performed by: INTERNAL MEDICINE

## 2022-07-23 PROCEDURE — 84132 ASSAY OF SERUM POTASSIUM: CPT | Performed by: INTERNAL MEDICINE

## 2022-07-23 PROCEDURE — 25000003 PHARM REV CODE 250

## 2022-07-23 PROCEDURE — 99233 SBSQ HOSP IP/OBS HIGH 50: CPT | Mod: ,,, | Performed by: PHYSICIAN ASSISTANT

## 2022-07-23 PROCEDURE — 83735 ASSAY OF MAGNESIUM: CPT | Performed by: INTERNAL MEDICINE

## 2022-07-23 PROCEDURE — 25000003 PHARM REV CODE 250: Performed by: PHYSICIAN ASSISTANT

## 2022-07-23 PROCEDURE — 85610 PROTHROMBIN TIME: CPT | Performed by: PHYSICIAN ASSISTANT

## 2022-07-23 PROCEDURE — 25000003 PHARM REV CODE 250: Performed by: THORACIC SURGERY (CARDIOTHORACIC VASCULAR SURGERY)

## 2022-07-23 PROCEDURE — 20600001 HC STEP DOWN PRIVATE ROOM

## 2022-07-23 PROCEDURE — 99233 PR SUBSEQUENT HOSPITAL CARE,LEVL III: ICD-10-PCS | Mod: ,,, | Performed by: PHYSICIAN ASSISTANT

## 2022-07-23 PROCEDURE — 82803 BLOOD GASES ANY COMBINATION: CPT

## 2022-07-23 PROCEDURE — 82800 BLOOD PH: CPT

## 2022-07-23 PROCEDURE — 63600175 PHARM REV CODE 636 W HCPCS: Performed by: PHYSICIAN ASSISTANT

## 2022-07-23 PROCEDURE — 25000003 PHARM REV CODE 250: Performed by: STUDENT IN AN ORGANIZED HEALTH CARE EDUCATION/TRAINING PROGRAM

## 2022-07-23 PROCEDURE — 83615 LACTATE (LD) (LDH) ENZYME: CPT | Performed by: THORACIC SURGERY (CARDIOTHORACIC VASCULAR SURGERY)

## 2022-07-23 PROCEDURE — A4216 STERILE WATER/SALINE, 10 ML: HCPCS | Performed by: THORACIC SURGERY (CARDIOTHORACIC VASCULAR SURGERY)

## 2022-07-23 PROCEDURE — 80053 COMPREHEN METABOLIC PANEL: CPT

## 2022-07-23 PROCEDURE — 63600175 PHARM REV CODE 636 W HCPCS: Performed by: INTERNAL MEDICINE

## 2022-07-23 PROCEDURE — 99900035 HC TECH TIME PER 15 MIN (STAT)

## 2022-07-23 PROCEDURE — 27000248 HC VAD-ADDITIONAL DAY

## 2022-07-23 PROCEDURE — 25000003 PHARM REV CODE 250: Performed by: ANESTHESIOLOGY

## 2022-07-23 RX ORDER — HYDROXYZINE HYDROCHLORIDE 25 MG/1
50 TABLET, FILM COATED ORAL ONCE
Status: COMPLETED | OUTPATIENT
Start: 2022-07-23 | End: 2022-07-23

## 2022-07-23 RX ORDER — FUROSEMIDE 80 MG/1
80 TABLET ORAL 2 TIMES DAILY
Status: DISCONTINUED | OUTPATIENT
Start: 2022-07-23 | End: 2022-07-28 | Stop reason: HOSPADM

## 2022-07-23 RX ORDER — MAGNESIUM SULFATE HEPTAHYDRATE 40 MG/ML
2 INJECTION, SOLUTION INTRAVENOUS ONCE
Status: COMPLETED | OUTPATIENT
Start: 2022-07-23 | End: 2022-07-23

## 2022-07-23 RX ADMIN — Medication 10 ML: at 12:07

## 2022-07-23 RX ADMIN — ACETAMINOPHEN 650 MG: 325 TABLET ORAL at 01:07

## 2022-07-23 RX ADMIN — LEVETIRACETAM 1000 MG: 500 TABLET, FILM COATED ORAL at 09:07

## 2022-07-23 RX ADMIN — POTASSIUM CHLORIDE 40 MEQ: 20 TABLET, EXTENDED RELEASE ORAL at 08:07

## 2022-07-23 RX ADMIN — GABAPENTIN 100 MG: 100 CAPSULE ORAL at 08:07

## 2022-07-23 RX ADMIN — ACETAMINOPHEN 650 MG: 325 TABLET ORAL at 06:07

## 2022-07-23 RX ADMIN — GABAPENTIN 100 MG: 100 CAPSULE ORAL at 09:07

## 2022-07-23 RX ADMIN — INSULIN ASPART 10 UNITS: 100 INJECTION, SOLUTION INTRAVENOUS; SUBCUTANEOUS at 05:07

## 2022-07-23 RX ADMIN — Medication 324 MG: at 08:07

## 2022-07-23 RX ADMIN — TIZANIDINE 2 MG: 2 TABLET ORAL at 06:07

## 2022-07-23 RX ADMIN — SPIRONOLACTONE 25 MG: 25 TABLET, FILM COATED ORAL at 08:07

## 2022-07-23 RX ADMIN — BUSPIRONE HYDROCHLORIDE 7.5 MG: 5 TABLET ORAL at 06:07

## 2022-07-23 RX ADMIN — FUROSEMIDE 80 MG: 80 TABLET ORAL at 05:07

## 2022-07-23 RX ADMIN — MAGNESIUM SULFATE HEPTAHYDRATE 2 G: 40 INJECTION, SOLUTION INTRAVENOUS at 06:07

## 2022-07-23 RX ADMIN — TIZANIDINE 2 MG: 2 TABLET ORAL at 01:07

## 2022-07-23 RX ADMIN — LEVETIRACETAM 1000 MG: 500 TABLET, FILM COATED ORAL at 08:07

## 2022-07-23 RX ADMIN — Medication 10 ML: at 01:07

## 2022-07-23 RX ADMIN — GABAPENTIN 100 MG: 100 CAPSULE ORAL at 03:07

## 2022-07-23 RX ADMIN — HYDROXYZINE HYDROCHLORIDE 50 MG: 25 TABLET, FILM COATED ORAL at 12:07

## 2022-07-23 RX ADMIN — INSULIN ASPART 10 UNITS: 100 INJECTION, SOLUTION INTRAVENOUS; SUBCUTANEOUS at 01:07

## 2022-07-23 RX ADMIN — DOBUTAMINE 2.5 MCG/KG/MIN: 12.5 INJECTION, SOLUTION, CONCENTRATE INTRAVENOUS at 03:07

## 2022-07-23 RX ADMIN — FAMOTIDINE 40 MG: 20 TABLET ORAL at 08:07

## 2022-07-23 RX ADMIN — TIZANIDINE 2 MG: 2 TABLET ORAL at 10:07

## 2022-07-23 RX ADMIN — POTASSIUM CHLORIDE 40 MEQ: 20 TABLET, EXTENDED RELEASE ORAL at 09:07

## 2022-07-23 RX ADMIN — ACETAMINOPHEN 650 MG: 325 TABLET ORAL at 10:07

## 2022-07-23 RX ADMIN — INSULIN DETEMIR 22 UNITS: 100 INJECTION, SOLUTION SUBCUTANEOUS at 09:07

## 2022-07-23 RX ADMIN — POTASSIUM CHLORIDE 40 MEQ: 20 TABLET, EXTENDED RELEASE ORAL at 03:07

## 2022-07-23 RX ADMIN — MIRTAZAPINE 15 MG: 15 TABLET, FILM COATED ORAL at 09:07

## 2022-07-23 RX ADMIN — Medication 10 ML: at 05:07

## 2022-07-23 RX ADMIN — Medication 10 ML: at 06:07

## 2022-07-23 RX ADMIN — FUROSEMIDE 80 MG: 10 INJECTION, SOLUTION INTRAMUSCULAR; INTRAVENOUS at 08:07

## 2022-07-23 NOTE — PLAN OF CARE
Plan of care discussed with patient.  Fall precautions in place. Continuing to encourage sternal precautions, IS, and ambulation. LVAD DP and numbers WNL, smooth LVAD hum. Patient has no complaints of pain.  gtts maintained. Encouraged workbook, reviewed education, filled in binder. Patient has no questions at this time. Will continue to monitor.

## 2022-07-23 NOTE — PROGRESS NOTES
[facility] - Cardiology Stepdown  Heart Transplant  Progress Note    Patient Name: Kevan Queen  MRN: [id number]  Admission Date: 6/13/2022  Hospital Length of Stay: 40 days  Attending Physician: Angela Yang DO  Primary Care Provider: ORALIA Cline  Principal Problem:LVAD (left ventricular assist device) present    Subjective:     Interval History: No acute events. No complaints this AM. Neuro started him on keppra for L temporal lobe partial seizure seen on EEG 2 days ago. No more shaking occurrences. His creatinine bumped slightly (1.5 to 1.7). CVP is 10 this AM. Transitioning form IVP lasix to PO lasix today. Will get another echo this weekend.     Continuous Infusions:   sodium chloride 0.9% 5 mL/hr at 06/27/22 0055    sodium chloride 0.9% Stopped (07/18/22 2000)    DOBUTamine IV infusion (non-titrating) 2.5 mcg/kg/min (07/22/22 1155)     Scheduled Meds:   acetaminophen  650 mg Oral Q8H    busPIRone  7.5 mg Oral Daily    famotidine  40 mg Oral Daily    ferrous gluconate  324 mg Oral Daily with breakfast    furosemide  80 mg Oral BID    gabapentin  100 mg Oral TID    insulin aspart U-100  10 Units Subcutaneous TIDWM    insulin detemir U-100  22 Units Subcutaneous QHS    INV aspirin/placebo  100 mg Oral Daily    levETIRAcetam  1,000 mg Oral BID    mirtazapine  15 mg Oral Nightly    polyethylene glycol  17 g Oral Daily    potassium chloride  40 mEq Oral TID    sodium chloride 0.9%  10 mL Intravenous Q6H    spironolactone  25 mg Oral Daily    tiZANidine  2 mg Oral Q8H    warfarin  5 mg Oral Daily     PRN Meds:albuterol sulfate, bisacodyL, dextrose 10%, dextrose 10%, docusate sodium, glucagon (human recombinant), glucose, glucose, insulin aspart U-100, insulin aspart U-100, LIDOcaine HCL 20 mg/ml (2%), magnesium sulfate IVPB, ondansetron, oxyCODONE, polyethylene glycol, promethazine (PHENERGAN) IVPB, sodium chloride 0.9%, sodium chloride 0.9%, Flushing PICC Protocol **AND** sodium  chloride 0.9% **AND** sodium chloride 0.9%, sodium chloride 0.9%, traMADoL    Review of patient's allergies indicates:   Allergen Reactions    Aspirin Other (See Comments)     Mr. Thacker is enrolled in Dr. Paige's Alon Trial and cannot have any aspirin and/or aspirin-containing products. DO NOT cancel any orders for INV Aspirin 100 mg/Placebo. If you have questions, please contact Isabel @ [phone], [phone],[email or web address], secure chat or MS Teams message.    Bumex [bumetanide] Hives    Lactose Diarrhea     Other reaction(s): Abdominal distension, gaseous    Torsemide Hives     Objective:     Vital Signs (Most Recent):  Temp: 98.1 °F (36.7 °C) (07/23/22 0802)  Pulse: 83 (07/23/22 0802)  Resp: 18 (07/23/22 0802)  BP: (!) 82/0 (07/23/22 0802)  SpO2: 98 % (07/23/22 0802)   Vital Signs (24h Range):  Temp:  [97.8 °F (36.6 °C)-98.1 °F (36.7 °C)] 98.1 °F (36.7 °C)  Pulse:  [] 83  Resp:  [14-20] 18  SpO2:  [91 %-100 %] 98 %  BP: ()/(0-73) 82/0     Patient Vitals for the past 72 hrs (Last 3 readings):   Weight   07/23/22 0700 82.8 kg (182 lb 8.7 oz)   07/22/22 0728 82.7 kg (182 lb 5.1 oz)   07/21/22 0825 83.9 kg (184 lb 15.5 oz)       Body mass index is 22.82 kg/m².      Intake/Output Summary (Last 24 hours) at 7/23/2022 1059  Last data filed at 7/23/2022 1021  Gross per 24 hour   Intake 222 ml   Output 1650 ml   Net -1428 ml       Hemodynamic Parameters: CVP 10       Telemetry: ST    Physical Exam  Vitals and nursing note reviewed.   Constitutional:       Appearance: Normal appearance.   HENT:      Head: Normocephalic and atraumatic.   Eyes:      Extraocular Movements: Extraocular movements intact.      Conjunctiva/sclera: Conjunctivae normal.   Neck:      Comments: Neck veins not noticeably elevated.   Cardiovascular:      Rate and Rhythm: Regular rhythm. Tachycardia present.      Comments: Smooth VAD hum  Pulmonary:      Breath sounds: Normal breath sounds.   Abdominal:      Palpations:  Abdomen is soft.   Musculoskeletal:         General: No swelling.      Cervical back: Neck supple.      Right lower leg: No edema.      Left lower leg: No edema.   Skin:     General: Skin is dry.      Capillary Refill: Capillary refill takes 2 to 3 seconds.   Neurological:      General: No focal deficit present.      Mental Status: He is alert and oriented to person, place, and time.      Motor: No weakness.   Psychiatric:         Mood and Affect: Mood normal.         Behavior: Behavior normal.         Thought Content: Thought content normal.         Judgment: Judgment normal.       Significant Labs:  CBC:  Recent Labs   Lab 07/19/22  0228 07/20/22  1215 07/22/22  0424 07/23/22  0424   WBC 20.07* 12.44 10.69  --    RBC 2.99* 3.10* 3.28*  --    HGB 7.4* 7.8* 8.2*  --    HCT 23.7* 25.9* 26.3* 29*    459* 514*  --    MCV 79* 84 80*  --    MCH 24.7* 25.2* 25.0*  --    MCHC 31.2* 30.1* 31.2*  --        BNP:  Recent Labs   Lab 07/18/22  0311 07/20/22  0358 07/22/22  0424   * 496* 318*       CMP:  Recent Labs   Lab 07/21/22  0430 07/21/22  1743 07/22/22  0424 07/22/22  1849 07/23/22  0427   *  --  171*  --  118*   CALCIUM 9.5  --  9.5  --  10.0   ALBUMIN 3.0*  --  3.1*  --  3.3*   PROT 8.2  --  8.4  --  8.4     --  137  --  136   K 3.4*  3.4*   < > 3.4*  3.4* 3.9 3.7  3.7   CO2 27  --  28  --  26   CL 98  --  98  --  99   BUN 12  --  12  --  14   CREATININE 1.3  --  1.5*  --  1.7*   ALKPHOS 140*  --  146*  --  143*   ALT 16  --  16  --  17   AST 27  --  24  --  29   BILITOT 1.0  --  1.1*  --  1.0    < > = values in this interval not displayed.        Coagulation:   Recent Labs   Lab 07/20/22  0358 07/21/22  0430 07/22/22  0424 07/23/22 0427   INR 2.6* 1.8* 2.0* 3.0*   APTT 34.5* 29.3 27.3  --        LDH:  Recent Labs   Lab 07/21/22  0430 07/22/22  0424 07/23/22 0427   * 266* 283*       Microbiology:  Microbiology Results (last 7 days)       Procedure Component Value Units Date/Time     "Blood culture [[id number]] Collected: 07/19/22 0006    Order Status: Completed Specimen: Blood from Peripheral, Antecubital, Left Updated: 07/23/22 0612     Blood Culture, Routine No Growth to date      No Growth to date      No Growth to date      No Growth to date      No Growth to date            I have reviewed all pertinent labs within the past 24 hours.    Estimated Creatinine Clearance: 69.7 mL/min (A) (based on SCr of 1.7 mg/dL (H)).    Diagnostic Results:  I have reviewed and interpreted all pertinent imaging results/findings within the past 24 hours.    Assessment and Plan:     38 yo male with NIDCM with BiV systolic heart failure, on home Milrinone at 0.375 mcg/kg/min, presented at [facility] 4/2/22 ,was not evaluated for OHT as he has recently quit smoking in April 2022 but was approved for VAD with plan to begin OHT evaluation in upcoming months if Mr Queen is stable and suitable for OHT eval (blood group A), 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 Went to ED at [facility] 6/10 but left after waiting 4 hours. Had clinic appointment with us today, but arrived to [facility] early this morning and decided to go to the ED instead. Baseline Lasix dose is 80 mg bid. Reports taking 240 mg qd past 3 days with no improvement. BNP is 1701, up from 898 on 6/2 and 49 on 5/24. sCr is 1.8 with baseline ~ 1.5-1.7. sPO2 on RA is 93%. Wife at bedside    He has been given Lasix 80 mg IVP in the ED with plan to start Lasix gtt at 20 mg/hr           * LVAD (left ventricular assist device) present  - S/P DT HM3 with MVR plus Protek Duo for RV support 6/29 (Grecia)  - RVAD removed with concern for " hemolysis.  - HTS primary   - GASTON trial   - INR was supratherapeuitc and coumadin was held. Goal 2.0-3.0 INR is 3.0 today.  Pharmacy to dose   - LDH stable  - CVP 10, SVO2: 61. Will stop Lasix drip and transition to IVP. Transitioning to PO lasix today.   -RHC with speed 5100 RA: 14, RV: 41/8 (14), PA: 42/17 (25), PWP: 16/17 (15), CO: 5.32, CI: 2.51  - ECHO 7/19 with speed at 5100- EF: 10%, LVEDD: 6mc (up from 4.24 with speed at 5200)      Procedure: Device Interrogation Including analysis of device parameters  Current Settings: Ventricular Assist Device  Review of device function is stable    TXP LVAD INTERROGATIONS 7/23/2022 7/22/2022 7/22/2022 7/22/2022 7/22/2022 7/22/2022 7/22/2022   Type HeartMate3 HeartMate3 HeartMate3 HeartMate3 HeartMate3 HeartMate3 HeartMate3   Flow 3.7 4.2 4.1 4.1 4.0 4.1 4   Speed 5100 5100 5100 5100 5100 5100 5100   PI 6.1 5.0 4.2 3.6 5.2 4.6 4.6   Power (Serna) 3.6 3.6 3.8 3.7 3.6 3.8 3.7   LSL 4700 - - - - 4700 4700   Pulsatility - Intermittent pulse Intermittent pulse - - - -       Temporal lobe seizure  -No post ictal period  -Appreciate Neurology consult.  EEG ordered for 1 hour.  Study was done; findings consistent with L temporal lobe seizure.   -orthostatic BP negative  -device interrogation negative j  - Neurology starting Keppra 1000 mg BID for partial temporal lobe seizure on EEG.       Leukocytosis  -on empiric Vanc and Cefepime for today of 5 days (end date 7/23)  -ID consulted appreciate their recommendations  -WBC improved    Tingling in extremities  - Pt reports paresthesias in right toes and fingers x 3 days   - Possible radiculopathy vs electrolyte abnormality vs neurovascular etiology   - Consulted General Neurology, suspect compression from surgical positioning/carpal tunnel syndrome. Recommended brace to L hand/wrist. If no improvement, will need outpatient EMG/NCS    Hyponatremia  -Hypervolemic, now improved  -Discontinued salt tablets   -Improvement in Na with  diuresis    Staphylococcus epidermidis bacteremia  - Blood cultures 6/20 and repeat 6/21 positive for Staph Epi. One of two blood cultures from 6/23 positive for Staph (taken prior to line exchange). Repeat cultures sent 6/25 NGTD  - IJ exchanged on 6/23, IABP exchanged 6/23  - ID recommended 14 day course of vancomycin from VAD implantation on 6/29 with end date: 7/12/22. Vancomycin discontinued per CTS with concerns for elevated sCr. ID with new recs to complete 7d course of daptomycin, which was completed 7/7/22    Uncontrolled diabetes mellitus  Admitted 5/24-5/27 with hyperglycemia hyperosmolar syndrome  - Hgb A1C 9.2 on 5/20/22  - Endocrine following, on insulin gtt    CKD (chronic kidney disease) stage 3, GFR 30-59 ml/min  SCr baseline ~ 1.5-1.7    Acute on chronic combined systolic and diastolic congestive heart failure, NYHA class 4  - NIDCM.  - Was not evaluated for OHTx as he quit smoking in April 2022.   -Diuresed well on Lasix drip; transitioning to IVP today.  Plan to transition to po Lasix over the next couple days.  (has had allergic reactions to po Bumex and Torsemide)  - Start 80 mg PO lasix bid starting tonight. (Currently on 80 mg IVP lasix bid)  - GDMT: Spironolactone   - See LVAD        Jeff Spencer PA-C  Heart Transplant  [facility] - Cardiology Stepdown

## 2022-07-23 NOTE — ASSESSMENT & PLAN NOTE
- NIDCM.  - Was not evaluated for OHTx as he quit smoking in April 2022.   -Diuresed well on Lasix drip; transitioning to IVP today.  Plan to transition to po Lasix over the next couple days.  (has had allergic reactions to po Bumex and Torsemide)  - Start 80 mg PO lasix bid starting tonight. (Currently on 80 mg IVP lasix bid)  - GDMT: Spironolactone   - See LVAD

## 2022-07-23 NOTE — PROGRESS NOTES
07/22/22 2212   Vital Signs   /73   MAP (mmHg) 92     Dr. Barnes notified of pressure. MD ordered to recheck in an hour. No ordered intervention at this time.

## 2022-07-23 NOTE — SUBJECTIVE & OBJECTIVE
Interval History: No acute events. No complaints this AM. Neuro started him on keppra for L temporal lobe partial seizure seen on EEG 2 days ago. No more shaking occurrences. His creatinine bumped slightly (1.5 to 1.7). CVP is 10 this AM. Transitioning form IVP lasix to PO lasix today. Will get another echo this weekend.     Continuous Infusions:   sodium chloride 0.9% 5 mL/hr at 06/27/22 0055    sodium chloride 0.9% Stopped (07/18/22 2000)    DOBUTamine IV infusion (non-titrating) 2.5 mcg/kg/min (07/22/22 1155)     Scheduled Meds:   acetaminophen  650 mg Oral Q8H    busPIRone  7.5 mg Oral Daily    famotidine  40 mg Oral Daily    ferrous gluconate  324 mg Oral Daily with breakfast    furosemide  80 mg Oral BID    gabapentin  100 mg Oral TID    insulin aspart U-100  10 Units Subcutaneous TIDWM    insulin detemir U-100  22 Units Subcutaneous QHS    INV aspirin/placebo  100 mg Oral Daily    levETIRAcetam  1,000 mg Oral BID    mirtazapine  15 mg Oral Nightly    polyethylene glycol  17 g Oral Daily    potassium chloride  40 mEq Oral TID    sodium chloride 0.9%  10 mL Intravenous Q6H    spironolactone  25 mg Oral Daily    tiZANidine  2 mg Oral Q8H    warfarin  5 mg Oral Daily     PRN Meds:albuterol sulfate, bisacodyL, dextrose 10%, dextrose 10%, docusate sodium, glucagon (human recombinant), glucose, glucose, insulin aspart U-100, insulin aspart U-100, LIDOcaine HCL 20 mg/ml (2%), magnesium sulfate IVPB, ondansetron, oxyCODONE, polyethylene glycol, promethazine (PHENERGAN) IVPB, sodium chloride 0.9%, sodium chloride 0.9%, Flushing PICC Protocol **AND** sodium chloride 0.9% **AND** sodium chloride 0.9%, sodium chloride 0.9%, traMADoL    Review of patient's allergies indicates:   Allergen Reactions    Aspirin Other (See Comments)     Mr. Thacker is enrolled in Dr. Paige's Alon Trial and cannot have any aspirin and/or aspirin-containing products. DO NOT cancel any orders for INV Aspirin 100 mg/Placebo. If you have  questions, please contact Isabel @ [phone],[email or web address], secure chat or MS Teams message.    Bumex [bumetanide] Hives    Lactose Diarrhea     Other reaction(s): Abdominal distension, gaseous    Torsemide Hives     Objective:     Vital Signs (Most Recent):  Temp: 98.1 °F (36.7 °C) (07/23/22 0802)  Pulse: 83 (07/23/22 0802)  Resp: 18 (07/23/22 0802)  BP: (!) 82/0 (07/23/22 0802)  SpO2: 98 % (07/23/22 0802)   Vital Signs (24h Range):  Temp:  [97.8 °F (36.6 °C)-98.1 °F (36.7 °C)] 98.1 °F (36.7 °C)  Pulse:  [] 83  Resp:  [14-20] 18  SpO2:  [91 %-100 %] 98 %  BP: ()/(0-73) 82/0     Patient Vitals for the past 72 hrs (Last 3 readings):   Weight   07/23/22 0700 82.8 kg (182 lb 8.7 oz)   07/22/22 0728 82.7 kg (182 lb 5.1 oz)   07/21/22 0825 83.9 kg (184 lb 15.5 oz)       Body mass index is 22.82 kg/m².      Intake/Output Summary (Last 24 hours) at 7/23/2022 1059  Last data filed at 7/23/2022 1021  Gross per 24 hour   Intake 222 ml   Output 1650 ml   Net -1428 ml       Hemodynamic Parameters: CVP 10       Telemetry: ST    Physical Exam  Vitals and nursing note reviewed.   Constitutional:       Appearance: Normal appearance.   HENT:      Head: Normocephalic and atraumatic.   Eyes:      Extraocular Movements: Extraocular movements intact.      Conjunctiva/sclera: Conjunctivae normal.   Neck:      Comments: Neck veins not noticeably elevated.   Cardiovascular:      Rate and Rhythm: Regular rhythm. Tachycardia present.      Comments: Smooth VAD hum  Pulmonary:      Breath sounds: Normal breath sounds.   Abdominal:      Palpations: Abdomen is soft.   Musculoskeletal:         General: No swelling.      Cervical back: Neck supple.      Right lower leg: No edema.      Left lower leg: No edema.   Skin:     General: Skin is dry.      Capillary Refill: Capillary refill takes 2 to 3 seconds.   Neurological:      General: No focal deficit present.      Mental Status: He is alert and oriented to person,  place, and time.      Motor: No weakness.   Psychiatric:         Mood and Affect: Mood normal.         Behavior: Behavior normal.         Thought Content: Thought content normal.         Judgment: Judgment normal.       Significant Labs:  CBC:  Recent Labs   Lab 07/19/22 0228 07/20/22  1215 07/22/22 0424 07/23/22 0424   WBC 20.07* 12.44 10.69  --    RBC 2.99* 3.10* 3.28*  --    HGB 7.4* 7.8* 8.2*  --    HCT 23.7* 25.9* 26.3* 29*    459* 514*  --    MCV 79* 84 80*  --    MCH 24.7* 25.2* 25.0*  --    MCHC 31.2* 30.1* 31.2*  --        BNP:  Recent Labs   Lab 07/18/22 0311 07/20/22 0358 07/22/22 0424   * 496* 318*       CMP:  Recent Labs   Lab 07/21/22 0430 07/21/22  1743 07/22/22 0424 07/22/22  1849 07/23/22 0427   *  --  171*  --  118*   CALCIUM 9.5  --  9.5  --  10.0   ALBUMIN 3.0*  --  3.1*  --  3.3*   PROT 8.2  --  8.4  --  8.4     --  137  --  136   K 3.4*  3.4*   < > 3.4*  3.4* 3.9 3.7  3.7   CO2 27  --  28  --  26   CL 98  --  98  --  99   BUN 12  --  12  --  14   CREATININE 1.3  --  1.5*  --  1.7*   ALKPHOS 140*  --  146*  --  143*   ALT 16  --  16  --  17   AST 27  --  24  --  29   BILITOT 1.0  --  1.1*  --  1.0    < > = values in this interval not displayed.        Coagulation:   Recent Labs   Lab 07/20/22 0358 07/21/22  0430 07/22/22 0424 07/23/22 0427   INR 2.6* 1.8* 2.0* 3.0*   APTT 34.5* 29.3 27.3  --        LDH:  Recent Labs   Lab 07/21/22  0430 07/22/22  0424 07/23/22  0427   * 266* 283*       Microbiology:  Microbiology Results (last 7 days)       Procedure Component Value Units Date/Time    Blood culture [[id number]] Collected: 07/19/22 0006    Order Status: Completed Specimen: Blood from Peripheral, Antecubital, Left Updated: 07/23/22 0612     Blood Culture, Routine No Growth to date      No Growth to date      No Growth to date      No Growth to date      No Growth to date            I have reviewed all pertinent labs within the past 24  hours.    Estimated Creatinine Clearance: 69.7 mL/min (A) (based on SCr of 1.7 mg/dL (H)).    Diagnostic Results:  I have reviewed and interpreted all pertinent imaging results/findings within the past 24 hours.

## 2022-07-23 NOTE — ASSESSMENT & PLAN NOTE
-No post ictal period  -Appreciate Neurology consult.  EEG ordered for 1 hour.  Study was done; findings consistent with L temporal lobe seizure.   -orthostatic BP negative  -device interrogation negative j  - Neurology starting Keppra 1000 mg BID for partial temporal lobe seizure on EEG.

## 2022-07-23 NOTE — PLAN OF CARE
Pt cooperative with routine care and procedures. Pt VSS. Pt up independently but reminded to call for assistance if increased weakness. Bed locked and in lowest position. Call bell and urinal within reach. Pt remained free from s/s of distress. Pain controlled with scheduled meds. LVAD sterile dressing change completed. SVO2 61. CVP 10. Dobutamine gtt running at 234.25 mcg/kg/min 3.5ml/hr.     Problem: Adult Inpatient Plan of Care  Goal: Plan of Care Review  Outcome: Ongoing, Progressing  Flowsheets (Taken 7/23/2022 0725)  Plan of Care Reviewed With:   patient   spouse  Goal: Patient-Specific Goal (Individualized)  Outcome: Ongoing, Progressing  Flowsheets (Taken 7/23/2022 0725)  Anxieties, Fears or Concerns: None verbalized  Individualized Care Needs: Monitor labs, vs, and dobutamine gtt  Patient-Specific Goals (Include Timeframe): To have family checked off on dressing change so pt can discharge before the end of this weekend.  Goal: Absence of Hospital-Acquired Illness or Injury  Outcome: Ongoing, Progressing  Goal: Optimal Comfort and Wellbeing  Outcome: Ongoing, Progressing  Goal: Readiness for Transition of Care  Outcome: Ongoing, Progressing     Problem: Diabetes Comorbidity  Goal: Blood Glucose Level Within Targeted Range  Outcome: Ongoing, Progressing     Problem: Infection  Goal: Absence of Infection Signs and Symptoms  Outcome: Ongoing, Progressing     Problem: Fall Injury Risk  Goal: Absence of Fall and Fall-Related Injury  Outcome: Ongoing, Progressing     Problem: Skin Injury Risk Increased  Goal: Skin Health and Integrity  Outcome: Ongoing, Progressing     Problem: Coping Ineffective  Goal: Effective Coping  Outcome: Ongoing, Progressing     Problem: Adjustment to Illness (Heart Failure)  Goal: Optimal Coping  Outcome: Ongoing, Progressing     Problem: Cardiac Output Decreased (Heart Failure)  Goal: Optimal Cardiac Output  Outcome: Ongoing, Progressing     Problem: Dysrhythmia (Heart Failure)  Goal:  Stable Heart Rate and Rhythm  Outcome: Ongoing, Progressing     Problem: Fluid Imbalance (Heart Failure)  Goal: Fluid Balance  Outcome: Ongoing, Progressing     Problem: Functional Ability Impaired (Heart Failure)  Goal: Optimal Functional Ability  Outcome: Ongoing, Progressing     Problem: Oral Intake Inadequate (Heart Failure)  Goal: Optimal Nutrition Intake  Outcome: Ongoing, Progressing     Problem: Respiratory Compromise (Heart Failure)  Goal: Effective Oxygenation and Ventilation  Outcome: Ongoing, Progressing     Problem: Sleep Disordered Breathing (Heart Failure)  Goal: Effective Breathing Pattern During Sleep  Outcome: Ongoing, Progressing     Problem: Adjustment to Device (Ventricular Assist Device)  Goal: Optimal Adjustment to Device  Outcome: Ongoing, Progressing     Problem: Bleeding (Ventricular Assist Device)  Goal: Absence of Bleeding  Outcome: Ongoing, Progressing     Problem: Embolism (Ventricular Assist Device)  Goal: Absence of Embolism Signs and Symptoms  Outcome: Ongoing, Progressing     Problem: Hemodynamic Instability (Ventricular Assist Device)  Goal: Optimal Blood Flow  Outcome: Ongoing, Progressing     Problem: Infection (Ventricular Assist Device)  Goal: Absence of Infection Signs and Symptoms  Outcome: Ongoing, Progressing     Problem: Right-Sided Heart Compromise (Ventricular Assist Device)  Goal: Effective Right-Sided Heart Function  Outcome: Ongoing, Progressing

## 2022-07-23 NOTE — CARE UPDATE
Care Update:     No acute events overnight. Patient on the CSU in room 316/316 A. Blood glucose stable. BG at goal on current insulin regimen (SSI, prandial, and basal insulin ). Steroid use- None. 2 Days Post-Op  Renal function- Normal   Vasopressors-  None       Diet [facility]; Consistent Carbohydrate; Fluid - 1500mL     POCT Glucose   Date Value Ref Range Status   07/23/2022 125 (H) 70 - 110 mg/dL Final   07/22/2022 171 (H) 70 - 110 mg/dL Final   07/22/2022 82 70 - 110 mg/dL Final   07/22/2022 316 (H) 70 - 110 mg/dL Final   07/22/2022 168 (H) 70 - 110 mg/dL Final   07/21/2022 130 (H) 70 - 110 mg/dL Final   07/21/2022 155 (H) 70 - 110 mg/dL Final   07/21/2022 121 (H) 70 - 110 mg/dL Final   07/21/2022 140 (H) 70 - 110 mg/dL Final   07/20/2022 171 (H) 70 - 110 mg/dL Final   07/20/2022 200 (H) 70 - 110 mg/dL Final   07/20/2022 136 (H) 70 - 110 mg/dL Final     Lab Results   Component Value Date    HGBA1C 9.2 (H) 05/20/2022       Endocrinology consulted for BG management.   BG goal 140-180    Diabetes Medications             insulin aspart U-100 (NOVOLOG) 100 unit/mL (3 mL) InPn pen Inject 12 Units into the skin 3 (three) times daily.    insulin detemir U-100 (LEVEMIR FLEXTOUCH) 100 unit/mL (3 mL) SubQ InPn pen Inject 23 Units into the skin once daily.      Hospital Medications   BG checks AC/HS            glucagon (human recombinant) injection 1 mg 1 mg, Intramuscular, As needed (PRN), Turn patient on their side, give IM, and NOTIFY MD IMMEDIATELY.<BR><BR>Feed the patient as soon as patient awakens and is able to swallow.    glucose chewable tablet 16 g 16 g, Oral, As needed (PRN), (16 grams = #  four 4gm glucose tablets)    glucose chewable tablet 24 g 24 g, Oral, As needed (PRN), (24 grams = # six 4gm glucose tablets)    insulin aspart U-100 pen 1-10 Units 1-10 Units, Subcutaneous, Before meals &amp; nightly PRN, **MODERATE CORRECTION DOSE**<BR>Blood Glucose<BR>mg/dL                  Pre-meal                 2200<BR>151-200                2 units                    1 unit<BR>201-250                4 units                    2 units  <BR>251-300                6 units                    3 units  <BR>301-350                8 units                    4 units <BR>&gt;350                     10 units                  5 units<BR>Administer subcutaneously if needed at times designated by monitoring schedule. <BR>DO NOT HOLD correction dose insulin for patients who are  NPO.<BR>&quot;HIGH ALERT MEDICATION&quot; - Administer with meals or TF/TPN.    insulin aspart U-100 pen 10 Units 10 Units, Subcutaneous, 3 times daily with meals, Administer subcutaneously with meals. HOLD prandial (mealtime) insulin if patient is NPO, unable to eat, or if Blood Glucose less than 70 mg/dL.<BR><BR>If patient ate prior to BG check, administer scheduled Novolog only (do not cover with correction dose at this time).<BR>&quot;HIGH ALERT MEDICATION&quot; - Administer with meals or TF/TPN.    insulin aspart U-100 pen 4 Units 4 Units, Subcutaneous, As needed (PRN), Administer subcutaneously with meal. HOLD prandial (mealtime) insulin if patient is NPO, unable to eat, or if Blood Glucose less than 70 mg/dL.<BR><BR>If patient ate prior to BG check, administer scheduled Novolog only (do not cover with correction dose at this time).<BR>&quot;HIGH ALERT MEDICATION&quot; - Administer with meals or TF/TPN.    insulin detemir U-100 pen 22 Units 22 Units, Subcutaneous, Nightly, DO NOT HOLD basal insulin when patient is NPO.<BR>If  Blood Glucose is less than 100 mg/dL at BEDTIME, give 16 grams (four glucose tablets) X 1 dose. Recheck BG in 30 minutes and call MD for insulin dose adjustments prior to administering insulin detemir (Levemir).<BR><BR>&quot;HIGH ALERT MEDICATION&quot;            ** Please notify Endocrine for any change and/or advance in diet**  ** Please call Endocrine for any BG related issues **    Discharge Planning:   TBD. Please notify  endocrinology prior to discharge.      Michael Wyman DNP, FNP-C  Department of Endocrinology  Inpatient Glycemic Management

## 2022-07-23 NOTE — ASSESSMENT & PLAN NOTE
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  - HTS primary   - GASTON trial   - INR was supratherapeuitc and coumadin was held. Goal 2.0-3.0 INR is 3.0 today.  Pharmacy to dose   - LDH stable  - CVP 10, SVO2: 61. Will stop Lasix drip and transition to IVP. Transitioning to PO lasix today.   -RHC with speed 5100 RA: 14, RV: 41/8 (14), PA: 42/17 (25), PWP: 16/17 (15), CO: 5.32, CI: 2.51  - ECHO 7/19 with speed at 5100- EF: 10%, LVEDD: 6mc (up from 4.24 with speed at 5200)      Procedure: Device Interrogation Including analysis of device parameters  Current Settings: Ventricular Assist Device  Review of device function is stable    TXP LVAD INTERROGATIONS 7/23/2022 7/22/2022 7/22/2022 7/22/2022 7/22/2022 7/22/2022 7/22/2022   Type HeartMate3 HeartMate3 HeartMate3 HeartMate3 HeartMate3 HeartMate3 HeartMate3   Flow 3.7 4.2 4.1 4.1 4.0 4.1 4   Speed 5100 5100 5100 5100 5100 5100 5100   PI 6.1 5.0 4.2 3.6 5.2 4.6 4.6   Power (Serna) 3.6 3.6 3.8 3.7 3.6 3.8 3.7   LSL 4700 - - - - 4700 4700   Pulsatility - Intermittent pulse Intermittent pulse - - - -

## 2022-07-24 LAB
ALBUMIN SERPL BCP-MCNC: 3.1 G/DL (ref 3.5–5.2)
ALLENS TEST: ABNORMAL
ALP SERPL-CCNC: 136 U/L (ref 55–135)
ALT SERPL W/O P-5'-P-CCNC: 13 U/L (ref 10–44)
ANION GAP SERPL CALC-SCNC: 5 MMOL/L (ref 8–16)
ASCENDING AORTA: 2.85 CM
AST SERPL-CCNC: 26 U/L (ref 10–40)
BACTERIA BLD CULT: NORMAL
BILIRUB SERPL-MCNC: 0.8 MG/DL (ref 0.1–1)
BSA FOR ECHO PROCEDURE: 2.13 M2
BUN SERPL-MCNC: 15 MG/DL (ref 6–20)
CALCIUM SERPL-MCNC: 9.9 MG/DL (ref 8.7–10.5)
CHLORIDE SERPL-SCNC: 100 MMOL/L (ref 95–110)
CO2 SERPL-SCNC: 28 MMOL/L (ref 23–29)
CREAT SERPL-MCNC: 1.6 MG/DL (ref 0.5–1.4)
CV ECHO LV RWT: 0.29 CM
DELSYS: ABNORMAL
DOP CALC LVOT AREA: 4.3 CM2
DOP CALC LVOT DIAMETER: 2.33 CM
E WAVE DECELERATION TIME: 142.12 MSEC
E/A RATIO: 1.12
E/E' RATIO: 11.6 M/S
ECHO LV POSTERIOR WALL: 0.9 CM (ref 0.6–1.1)
EJECTION FRACTION: 10 %
EST. GFR  (AFRICAN AMERICAN): >60 ML/MIN/1.73 M^2
EST. GFR  (NON AFRICAN AMERICAN): 54.2 ML/MIN/1.73 M^2
FRACTIONAL SHORTENING: 2 % (ref 28–44)
GLUCOSE SERPL-MCNC: 133 MG/DL (ref 70–110)
HCO3 UR-SCNC: 26.9 MMOL/L (ref 24–28)
INR PPP: 2.5 (ref 0.8–1.2)
INTERVENTRICULAR SEPTUM: 0.9 CM (ref 0.6–1.1)
LA MAJOR: 5.23 CM
LA MINOR: 5.85 CM
LA WIDTH: 2.91 CM
LDH SERPL L TO P-CCNC: 260 U/L (ref 110–260)
LEFT ATRIUM SIZE: 2.49 CM
LEFT ATRIUM VOLUME INDEX MOD: 21 ML/M2
LEFT ATRIUM VOLUME INDEX: 15.9 ML/M2
LEFT ATRIUM VOLUME MOD: 44.84 CM3
LEFT ATRIUM VOLUME: 34.01 CM3
LEFT INTERNAL DIMENSION IN SYSTOLE: 6.15 CM (ref 2.1–4)
LEFT VENTRICLE DIASTOLIC VOLUME INDEX: 69.5 ML/M2
LEFT VENTRICLE DIASTOLIC VOLUME: 148.74 ML
LEFT VENTRICLE MASS INDEX: 110 G/M2
LEFT VENTRICLE SYSTOLIC VOLUME INDEX: 63.6 ML/M2
LEFT VENTRICLE SYSTOLIC VOLUME: 136.09 ML
LEFT VENTRICULAR INTERNAL DIMENSION IN DIASTOLE: 6.3 CM (ref 3.5–6)
LEFT VENTRICULAR MASS: 234.72 G
LV LATERAL E/E' RATIO: 9.67 M/S
LV SEPTAL E/E' RATIO: 14.5 M/S
MAGNESIUM SERPL-MCNC: 1.8 MG/DL (ref 1.6–2.6)
MAGNESIUM SERPL-MCNC: 2.1 MG/DL (ref 1.6–2.6)
MV PEAK A VEL: 0.52 M/S
MV PEAK E VEL: 0.58 M/S
MV STENOSIS PRESSURE HALF TIME: 41.22 MS
MV VALVE AREA P 1/2 METHOD: 5.34 CM2
PCO2 BLDA: 47.1 MMHG (ref 35–45)
PH SMN: 7.37 [PH] (ref 7.35–7.45)
PISA TR MAX VEL: 2.65 M/S
PO2 BLDA: 30 MMHG (ref 40–60)
POC BE: 1 MMOL/L
POC SATURATED O2: 55 % (ref 95–100)
POC TCO2: 28 MMOL/L (ref 24–29)
POCT GLUCOSE: 148 MG/DL (ref 70–110)
POCT GLUCOSE: 149 MG/DL (ref 70–110)
POCT GLUCOSE: 157 MG/DL (ref 70–110)
POCT GLUCOSE: 163 MG/DL (ref 70–110)
POCT GLUCOSE: 164 MG/DL (ref 70–110)
POTASSIUM SERPL-SCNC: 4.1 MMOL/L (ref 3.5–5.1)
POTASSIUM SERPL-SCNC: 4.1 MMOL/L (ref 3.5–5.1)
POTASSIUM SERPL-SCNC: 4.5 MMOL/L (ref 3.5–5.1)
PROT SERPL-MCNC: 7.9 G/DL (ref 6–8.4)
PROTHROMBIN TIME: 24.5 SEC (ref 9–12.5)
RA MAJOR: 5.62 CM
RA PRESSURE: 8 MMHG
RA WIDTH: 4.3 CM
RIGHT VENTRICULAR END-DIASTOLIC DIMENSION: 5.14 CM
SAMPLE: ABNORMAL
SINUS: 2.98 CM
SITE: ABNORMAL
SODIUM SERPL-SCNC: 133 MMOL/L (ref 136–145)
STJ: 2.33 CM
TDI LATERAL: 0.06 M/S
TDI SEPTAL: 0.04 M/S
TDI: 0.05 M/S
TR MAX PG: 28 MMHG
TRICUSPID ANNULAR PLANE SYSTOLIC EXCURSION: 0.8 CM
TV REST PULMONARY ARTERY PRESSURE: 36 MMHG

## 2022-07-24 PROCEDURE — 25000003 PHARM REV CODE 250: Performed by: INTERNAL MEDICINE

## 2022-07-24 PROCEDURE — 25000003 PHARM REV CODE 250: Performed by: THORACIC SURGERY (CARDIOTHORACIC VASCULAR SURGERY)

## 2022-07-24 PROCEDURE — 25000003 PHARM REV CODE 250: Performed by: PHYSICIAN ASSISTANT

## 2022-07-24 PROCEDURE — 80053 COMPREHEN METABOLIC PANEL: CPT

## 2022-07-24 PROCEDURE — 93750 PR INTERROGATE VENT ASSIST DEV, IN PERSON, W PHYSICIAN ANALYSIS: ICD-10-PCS | Mod: ,,, | Performed by: INTERNAL MEDICINE

## 2022-07-24 PROCEDURE — 20600001 HC STEP DOWN PRIVATE ROOM

## 2022-07-24 PROCEDURE — 82803 BLOOD GASES ANY COMBINATION: CPT

## 2022-07-24 PROCEDURE — C9399 UNCLASSIFIED DRUGS OR BIOLOG: HCPCS | Performed by: NURSE PRACTITIONER

## 2022-07-24 PROCEDURE — 83735 ASSAY OF MAGNESIUM: CPT | Performed by: INTERNAL MEDICINE

## 2022-07-24 PROCEDURE — 25000003 PHARM REV CODE 250: Performed by: ANESTHESIOLOGY

## 2022-07-24 PROCEDURE — 99900035 HC TECH TIME PER 15 MIN (STAT)

## 2022-07-24 PROCEDURE — 93750 INTERROGATION VAD IN PERSON: CPT | Mod: ,,, | Performed by: INTERNAL MEDICINE

## 2022-07-24 PROCEDURE — 25000003 PHARM REV CODE 250

## 2022-07-24 PROCEDURE — 27000248 HC VAD-ADDITIONAL DAY

## 2022-07-24 PROCEDURE — 82800 BLOOD PH: CPT

## 2022-07-24 PROCEDURE — 99233 SBSQ HOSP IP/OBS HIGH 50: CPT | Mod: ,,, | Performed by: PHYSICIAN ASSISTANT

## 2022-07-24 PROCEDURE — A4216 STERILE WATER/SALINE, 10 ML: HCPCS | Performed by: THORACIC SURGERY (CARDIOTHORACIC VASCULAR SURGERY)

## 2022-07-24 PROCEDURE — 83615 LACTATE (LD) (LDH) ENZYME: CPT | Performed by: THORACIC SURGERY (CARDIOTHORACIC VASCULAR SURGERY)

## 2022-07-24 PROCEDURE — 84132 ASSAY OF SERUM POTASSIUM: CPT | Performed by: INTERNAL MEDICINE

## 2022-07-24 PROCEDURE — 85610 PROTHROMBIN TIME: CPT | Performed by: PHYSICIAN ASSISTANT

## 2022-07-24 PROCEDURE — 25000003 PHARM REV CODE 250: Performed by: NURSE PRACTITIONER

## 2022-07-24 PROCEDURE — 25000003 PHARM REV CODE 250: Performed by: STUDENT IN AN ORGANIZED HEALTH CARE EDUCATION/TRAINING PROGRAM

## 2022-07-24 PROCEDURE — 99233 PR SUBSEQUENT HOSPITAL CARE,LEVL III: ICD-10-PCS | Mod: ,,, | Performed by: PHYSICIAN ASSISTANT

## 2022-07-24 RX ORDER — WARFARIN 3 MG/1
3 TABLET ORAL DAILY
Status: DISCONTINUED | OUTPATIENT
Start: 2022-07-24 | End: 2022-07-28 | Stop reason: HOSPADM

## 2022-07-24 RX ADMIN — Medication 10 ML: at 09:07

## 2022-07-24 RX ADMIN — POTASSIUM CHLORIDE 40 MEQ: 20 TABLET, EXTENDED RELEASE ORAL at 04:07

## 2022-07-24 RX ADMIN — FAMOTIDINE 40 MG: 20 TABLET ORAL at 08:07

## 2022-07-24 RX ADMIN — INSULIN ASPART 2 UNITS: 100 INJECTION, SOLUTION INTRAVENOUS; SUBCUTANEOUS at 05:07

## 2022-07-24 RX ADMIN — LEVETIRACETAM 1000 MG: 500 TABLET, FILM COATED ORAL at 09:07

## 2022-07-24 RX ADMIN — INSULIN ASPART 1 UNITS: 100 INJECTION, SOLUTION INTRAVENOUS; SUBCUTANEOUS at 09:07

## 2022-07-24 RX ADMIN — Medication 10 ML: at 06:07

## 2022-07-24 RX ADMIN — INSULIN ASPART 10 UNITS: 100 INJECTION, SOLUTION INTRAVENOUS; SUBCUTANEOUS at 05:07

## 2022-07-24 RX ADMIN — GABAPENTIN 100 MG: 100 CAPSULE ORAL at 08:07

## 2022-07-24 RX ADMIN — POTASSIUM CHLORIDE 40 MEQ: 20 TABLET, EXTENDED RELEASE ORAL at 08:07

## 2022-07-24 RX ADMIN — FUROSEMIDE 80 MG: 80 TABLET ORAL at 05:07

## 2022-07-24 RX ADMIN — TIZANIDINE 2 MG: 2 TABLET ORAL at 11:07

## 2022-07-24 RX ADMIN — TIZANIDINE 2 MG: 2 TABLET ORAL at 01:07

## 2022-07-24 RX ADMIN — WARFARIN SODIUM 3 MG: 3 TABLET ORAL at 04:07

## 2022-07-24 RX ADMIN — ACETAMINOPHEN 650 MG: 325 TABLET ORAL at 11:07

## 2022-07-24 RX ADMIN — POTASSIUM CHLORIDE 40 MEQ: 20 TABLET, EXTENDED RELEASE ORAL at 09:07

## 2022-07-24 RX ADMIN — ACETAMINOPHEN 650 MG: 325 TABLET ORAL at 01:07

## 2022-07-24 RX ADMIN — INSULIN DETEMIR 22 UNITS: 100 INJECTION, SOLUTION SUBCUTANEOUS at 09:07

## 2022-07-24 RX ADMIN — ACETAMINOPHEN 650 MG: 325 TABLET ORAL at 06:07

## 2022-07-24 RX ADMIN — MIRTAZAPINE 15 MG: 15 TABLET, FILM COATED ORAL at 09:07

## 2022-07-24 RX ADMIN — Medication 324 MG: at 08:07

## 2022-07-24 RX ADMIN — TIZANIDINE 2 MG: 2 TABLET ORAL at 06:07

## 2022-07-24 RX ADMIN — FUROSEMIDE 80 MG: 80 TABLET ORAL at 08:07

## 2022-07-24 RX ADMIN — GABAPENTIN 100 MG: 100 CAPSULE ORAL at 04:07

## 2022-07-24 RX ADMIN — OXYCODONE HYDROCHLORIDE 10 MG: 10 TABLET ORAL at 12:07

## 2022-07-24 RX ADMIN — GABAPENTIN 100 MG: 100 CAPSULE ORAL at 09:07

## 2022-07-24 RX ADMIN — BUSPIRONE HYDROCHLORIDE 7.5 MG: 5 TABLET ORAL at 06:07

## 2022-07-24 RX ADMIN — SPIRONOLACTONE 25 MG: 25 TABLET, FILM COATED ORAL at 08:07

## 2022-07-24 RX ADMIN — LEVETIRACETAM 1000 MG: 500 TABLET, FILM COATED ORAL at 08:07

## 2022-07-24 RX ADMIN — OXYCODONE HYDROCHLORIDE 10 MG: 10 TABLET ORAL at 09:07

## 2022-07-24 NOTE — PROGRESS NOTES
07/24/2022  Casey Arizmendi    Current provider:  Angela Yang DO    Device interrogation:  TXP LVAD INTERROGATIONS 7/24/2022 7/24/2022 7/24/2022 7/23/2022 7/23/2022 7/23/2022 7/23/2022   Type HeartMate3 HeartMate3 HeartMate3 HeartMate3 HeartMate3 HeartMate3 HeartMate3   Flow 3.9 3.8 4.0 4.1 4.2 3.6 3.7   Speed 5100 5100 5100 5150 5100 5100 5100   PI 5.5 4.9 4.5 4.0 5.7 6.0 6.1   Power (Serna) 3.7 3.6 3.6 3.7 3.7 3.8 3.6   LSL - - - - - 4700 4700   Pulsatility - Intermittent pulse Intermittent pulse Intermittent pulse - - -          Rounded on Kevan Queen to ensure all mechanical assist device settings (IABP or VAD) were appropriate and all parameters were within limits.  I was able to ensure all back up equipment was present, the staff had no issues, and the Perfusion Department daily rounding was complete.      For implantable VADs: Interrogation of Ventricular assist device was performed with analysis of device parameters and review of device function. I have personally reviewed the interrogation findings and agree with findings as stated.     In emergency, the nursing units have been notified to contact the perfusion department either by:  Calling [phone] from 630am to 4pm Mon thru Fri, utilizing the On-Call Finder functionality of Epic and searching for Perfusion, or by contacting the hospital  from 4pm to 630am and on weekends and asking to speak with the perfusionist on call.    11:04 AM

## 2022-07-24 NOTE — PLAN OF CARE
Pt cooperative with routine care and procedures. Pt VSS. Pt up independently but reminded to call for assistance if increased weakness. Bed locked and in lowest position. Call bell and urinal within reach. Pt remained free from s/s of distress. Pain controlled with scheduled meds. LVAD sterile dressing change completed with Page his partner. SVO2 55. CVP 14. Dobutamine gtt running at 234.25 mcg/kg/min 3.5ml/hr. Dr. Barnes notified of SVO2 below 60 as ordered.      Problem: Adult Inpatient Plan of Care  Goal: Plan of Care Review  Outcome: Ongoing, Progressing  Flowsheets (Taken 7/24/2022 0303)  Plan of Care Reviewed With:   patient   spouse  Goal: Patient-Specific Goal (Individualized)  Outcome: Ongoing, Progressing  Flowsheets (Taken 7/24/2022 0303)  Anxieties, Fears or Concerns: Pain management and for family to be checked off on dressing change  Individualized Care Needs: Monitor labs, vs, and dobutamine gtt  Patient-Specific Goals (Include Timeframe): To have family checked off on dressing chnage so pt can discharge before friday  Goal: Absence of Hospital-Acquired Illness or Injury  Outcome: Ongoing, Progressing  Goal: Optimal Comfort and Wellbeing  Outcome: Ongoing, Progressing  Goal: Readiness for Transition of Care  Outcome: Ongoing, Progressing     Problem: Diabetes Comorbidity  Goal: Blood Glucose Level Within Targeted Range  Outcome: Ongoing, Progressing     Problem: Infection  Goal: Absence of Infection Signs and Symptoms  Outcome: Ongoing, Progressing     Problem: Fall Injury Risk  Goal: Absence of Fall and Fall-Related Injury  Outcome: Ongoing, Progressing     Problem: Skin Injury Risk Increased  Goal: Skin Health and Integrity  Outcome: Ongoing, Progressing     Problem: Coping Ineffective  Goal: Effective Coping  Outcome: Ongoing, Progressing     Problem: Adjustment to Illness (Heart Failure)  Goal: Optimal Coping  Outcome: Ongoing, Progressing     Problem: Cardiac Output Decreased (Heart  Failure)  Goal: Optimal Cardiac Output  Outcome: Ongoing, Progressing     Problem: Dysrhythmia (Heart Failure)  Goal: Stable Heart Rate and Rhythm  Outcome: Ongoing, Progressing     Problem: Fluid Imbalance (Heart Failure)  Goal: Fluid Balance  Outcome: Ongoing, Progressing     Problem: Functional Ability Impaired (Heart Failure)  Goal: Optimal Functional Ability  Outcome: Ongoing, Progressing     Problem: Oral Intake Inadequate (Heart Failure)  Goal: Optimal Nutrition Intake  Outcome: Ongoing, Progressing     Problem: Respiratory Compromise (Heart Failure)  Goal: Effective Oxygenation and Ventilation  Outcome: Ongoing, Progressing     Problem: Sleep Disordered Breathing (Heart Failure)  Goal: Effective Breathing Pattern During Sleep  Outcome: Ongoing, Progressing     Problem: Adjustment to Device (Ventricular Assist Device)  Goal: Optimal Adjustment to Device  Outcome: Ongoing, Progressing     Problem: Bleeding (Ventricular Assist Device)  Goal: Absence of Bleeding  Outcome: Ongoing, Progressing     Problem: Embolism (Ventricular Assist Device)  Goal: Absence of Embolism Signs and Symptoms  Outcome: Ongoing, Progressing     Problem: Hemodynamic Instability (Ventricular Assist Device)  Goal: Optimal Blood Flow  Outcome: Ongoing, Progressing     Problem: Infection (Ventricular Assist Device)  Goal: Absence of Infection Signs and Symptoms  Outcome: Ongoing, Progressing     Problem: Right-Sided Heart Compromise (Ventricular Assist Device)  Goal: Effective Right-Sided Heart Function  Outcome: Ongoing, Progressing

## 2022-07-24 NOTE — PLAN OF CARE
Plan of care discussed with patient.  Patient ambulating independently, fall precautions in place.Continuing to encourage sternal precautions, IS, and ambulation. LVAD DP and numbers WNL, smooth LVAD hum. Patient has no complaints of pain.  gtts maintained. Patient has no questions at this time. Will continue to monitor.

## 2022-07-24 NOTE — PROGRESS NOTES
[facility] - Cardiology Stepdown  Heart Transplant  Progress Note    Patient Name: Kevan Queen  MRN: [id number]  Admission Date: 6/13/2022  Hospital Length of Stay: 41 days  Attending Physician: Angela Yang DO  Primary Care Provider: ORALIA Cline  Principal Problem:LVAD (left ventricular assist device) present    Subjective:     Interval History: No acute events. No complaints this AM. Now on PO diuretics w/ 80 mg lasix bid. Was net negative 925cc w/ 1.3L UOP documented yesterday, although this was with a AM IVP of lasix prior to being transitioned. Will see how he does today on purely PO lasix.     Continuous Infusions:   sodium chloride 0.9% 5 mL/hr at 06/27/22 0055    sodium chloride 0.9% Stopped (07/18/22 2000)    DOBUTamine IV infusion (non-titrating) 2.5 mcg/kg/min (07/23/22 1542)     Scheduled Meds:   acetaminophen  650 mg Oral Q8H    busPIRone  7.5 mg Oral Daily    famotidine  40 mg Oral Daily    ferrous gluconate  324 mg Oral Daily with breakfast    furosemide  80 mg Oral BID    gabapentin  100 mg Oral TID    insulin aspart U-100  10 Units Subcutaneous TIDWM    insulin detemir U-100  22 Units Subcutaneous QHS    INV aspirin/placebo  100 mg Oral Daily    levETIRAcetam  1,000 mg Oral BID    mirtazapine  15 mg Oral Nightly    polyethylene glycol  17 g Oral Daily    potassium chloride  40 mEq Oral TID    sodium chloride 0.9%  10 mL Intravenous Q6H    spironolactone  25 mg Oral Daily    tiZANidine  2 mg Oral Q8H    warfarin  3 mg Oral Daily     PRN Meds:albuterol sulfate, bisacodyL, dextrose 10%, dextrose 10%, docusate sodium, glucagon (human recombinant), glucose, glucose, insulin aspart U-100, insulin aspart U-100, LIDOcaine HCL 20 mg/ml (2%), magnesium sulfate IVPB, ondansetron, oxyCODONE, polyethylene glycol, promethazine (PHENERGAN) IVPB, sodium chloride 0.9%, sodium chloride 0.9%, Flushing PICC Protocol **AND** sodium chloride 0.9% **AND** sodium chloride 0.9%, sodium  chloride 0.9%, traMADoL    Review of patient's allergies indicates:   Allergen Reactions    Aspirin Other (See Comments)     Mr. Thacker is enrolled in Dr. Paige's Alon Trial and cannot have any aspirin and/or aspirin-containing products. DO NOT cancel any orders for INV Aspirin 100 mg/Placebo. If you have questions, please contact Isabel @ [phone], [phone], secure chat or MS Teams message.    Bumex [bumetanide] Hives    Lactose Diarrhea     Other reaction(s): Abdominal distension, gaseous    Torsemide Hives     Objective:     Vital Signs (Most Recent):  Temp: 97.3 °F (36.3 °C) (07/24/22 0743)  Pulse: 88 (07/24/22 0800)  Resp: 18 (07/24/22 0743)  BP: (!) 90/0 (07/24/22 0800)  SpO2: 99 % (07/24/22 0743)   Vital Signs (24h Range):  Temp:  [97.3 °F (36.3 °C)-98.4 °F (36.9 °C)] 97.3 °F (36.3 °C)  Pulse:  [] 88  Resp:  [18-20] 18  SpO2:  [95 %-100 %] 99 %  BP: (76-96)/(0-70) 90/0     Patient Vitals for the past 72 hrs (Last 3 readings):   Weight   07/24/22 0800 85.7 kg (188 lb 15 oz)   07/23/22 0700 82.8 kg (182 lb 8.7 oz)   07/22/22 0728 82.7 kg (182 lb 5.1 oz)       Body mass index is 23.62 kg/m².      Intake/Output Summary (Last 24 hours) at 7/24/2022 1118  Last data filed at 7/24/2022 0500  Gross per 24 hour   Intake 450 ml   Output 1025 ml   Net -575 ml       Hemodynamic Parameters: CVP 10  CVP:  [14 mmHg] 14 mmHg    Telemetry: ST    Physical Exam  Vitals and nursing note reviewed.   Constitutional:       Appearance: Normal appearance.   HENT:      Head: Normocephalic and atraumatic.   Eyes:      Extraocular Movements: Extraocular movements intact.      Conjunctiva/sclera: Conjunctivae normal.   Neck:      Comments: Neck veins not noticeably elevated.   Cardiovascular:      Rate and Rhythm: Regular rhythm. Tachycardia present.      Comments: Smooth VAD hum  Pulmonary:      Breath sounds: Normal breath sounds.   Abdominal:      Palpations: Abdomen is soft.   Musculoskeletal:          General: No swelling.      Cervical back: Neck supple.      Right lower leg: No edema.      Left lower leg: No edema.   Skin:     General: Skin is dry.      Capillary Refill: Capillary refill takes 2 to 3 seconds.   Neurological:      General: No focal deficit present.      Mental Status: He is alert and oriented to person, place, and time.      Motor: No weakness.   Psychiatric:         Mood and Affect: Mood normal.         Behavior: Behavior normal.         Thought Content: Thought content normal.         Judgment: Judgment normal.       Significant Labs:  CBC:  Recent Labs   Lab 07/19/22  0228 07/20/22  1215 07/22/22  0424 07/23/22  0424   WBC 20.07* 12.44 10.69  --    RBC 2.99* 3.10* 3.28*  --    HGB 7.4* 7.8* 8.2*  --    HCT 23.7* 25.9* 26.3* 29*    459* 514*  --    MCV 79* 84 80*  --    MCH 24.7* 25.2* 25.0*  --    MCHC 31.2* 30.1* 31.2*  --        BNP:  Recent Labs   Lab 07/18/22  0311 07/20/22  0358 07/22/22  0424   * 496* 318*       CMP:  Recent Labs   Lab 07/22/22  0424 07/22/22  1849 07/23/22  0427 07/23/22  1731 07/24/22  0508   *  --  118*  --  133*   CALCIUM 9.5  --  10.0  --  9.9   ALBUMIN 3.1*  --  3.3*  --  3.1*   PROT 8.4  --  8.4  --  7.9     --  136  --  133*   K 3.4*  3.4*   < > 3.7  3.7 4.2 4.1  4.1   CO2 28  --  26  --  28   CL 98  --  99  --  100   BUN 12  --  14  --  15   CREATININE 1.5*  --  1.7*  --  1.6*   ALKPHOS 146*  --  143*  --  136*   ALT 16  --  17  --  13   AST 24  --  29  --  26   BILITOT 1.1*  --  1.0  --  0.8    < > = values in this interval not displayed.        Coagulation:   Recent Labs   Lab 07/20/22  0358 07/21/22  0430 07/22/22  0424 07/23/22  0427 07/24/22  0508   INR 2.6* 1.8* 2.0* 3.0* 2.5*   APTT 34.5* 29.3 27.3  --   --        LDH:  Recent Labs   Lab 07/22/22  0424 07/23/22  0427 07/24/22  0508   * 283* 260       Microbiology:  Microbiology Results (last 7 days)       Procedure Component Value Units Date/Time    Blood culture  "[[id number]] Collected: 07/19/22 0006    Order Status: Completed Specimen: Blood from Peripheral, Antecubital, Left Updated: 07/24/22 0612     Blood Culture, Routine No growth after 5 days.            I have reviewed all pertinent labs within the past 24 hours.    Estimated Creatinine Clearance: 75.6 mL/min (A) (based on SCr of 1.6 mg/dL (H)).    Diagnostic Results:  I have reviewed and interpreted all pertinent imaging results/findings within the past 24 hours.    Assessment and Plan:     36 yo male with NIDCM with BiV systolic heart failure, on home Milrinone at 0.375 mcg/kg/min, presented at [facility] 4/2/22 ,was not evaluated for OHT as he has recently quit smoking in April 2022 but was approved for VAD with plan to begin OHT evaluation in upcoming months if Mr Queen is stable and suitable for OHT eval (blood group A), 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 Went to ED at [facility] 6/10 but left after waiting 4 hours. Had clinic appointment with us today, but arrived to [facility] early this morning and decided to go to the ED instead. Baseline Lasix dose is 80 mg bid. Reports taking 240 mg qd past 3 days with no improvement. BNP is 1701, up from 898 on 6/2 and 49 on 5/24. sCr is 1.8 with baseline ~ 1.5-1.7. sPO2 on RA is 93%. Wife at bedside    He has been given Lasix 80 mg IVP in the ED with plan to start Lasix gtt at 20 mg/hr           * LVAD (left ventricular assist device) present  - S/P DT HM3 with MVR plus Protek Duo for RV support 6/29 (Grecia)  - RVAD removed with concern for hemolysis.  - HTS primary   - GASTON trial   - INR was supratherapeuitc and coumadin was held. Goal 2.0-3.0 " INR is 3.0 today.  Pharmacy to dose   - LDH stable  - CVP 14, SVO2: 55%. Will see how he does on PO diuretics today. (was net -ruby yesterday but transitioned him mid day so he got a AM IV push.)  -RHC with speed 5100 RA: 14, RV: 41/8 (14), PA: 42/17 (25), PWP: 16/17 (15), CO: 5.32, CI: 2.51  - ECHO 7/19 with speed at 5100- EF: 10%, LVEDD: 6mc (up from 4.24 with speed at 5200)  - New echo pending today.       Procedure: Device Interrogation Including analysis of device parameters  Current Settings: Ventricular Assist Device  Review of device function is stable    TXP LVAD INTERROGATIONS 7/24/2022 7/24/2022 7/24/2022 7/23/2022 7/23/2022 7/23/2022 7/23/2022   Type HeartMate3 HeartMate3 HeartMate3 HeartMate3 HeartMate3 HeartMate3 HeartMate3   Flow 3.9 3.8 4.0 4.1 4.2 3.6 3.7   Speed 5100 5100 5100 5150 5100 5100 5100   PI 5.5 4.9 4.5 4.0 5.7 6.0 6.1   Power (Serna) 3.7 3.6 3.6 3.7 3.7 3.8 3.6   LSL - - - - - 4700 4700   Pulsatility - Intermittent pulse Intermittent pulse Intermittent pulse - - -       Temporal lobe seizure  -No post ictal period  -Appreciate Neurology consult.  EEG ordered for 1 hour.  Study was done; findings consistent with L temporal lobe seizure.   -orthostatic BP negative  -device interrogation negative j  - Neurology starting Keppra 1000 mg BID for partial temporal lobe seizure on EEG.       Leukocytosis  -on empiric Vanc and Cefepime for today of 5 days (end date 7/23)  -ID consulted appreciate their recommendations  -WBC improved    Tingling in extremities  - Pt reports paresthesias in right toes and fingers x 3 days   - Possible radiculopathy vs electrolyte abnormality vs neurovascular etiology   - Consulted General Neurology, suspect compression from surgical positioning/carpal tunnel syndrome. Recommended brace to L hand/wrist. If no improvement, will need outpatient EMG/NCS    Hyponatremia  -Hypervolemic, now improved  -Discontinued salt tablets   -Improvement in Na with  diuresis    Staphylococcus epidermidis bacteremia  - Blood cultures 6/20 and repeat 6/21 positive for Staph Epi. One of two blood cultures from 6/23 positive for Staph (taken prior to line exchange). Repeat cultures sent 6/25 NGTD  - IJ exchanged on 6/23, IABP exchanged 6/23  - ID recommended 14 day course of vancomycin from VAD implantation on 6/29 with end date: 7/12/22. Vancomycin discontinued per CTS with concerns for elevated sCr. ID with new recs to complete 7d course of daptomycin, which was completed 7/7/22    Uncontrolled diabetes mellitus  Admitted 5/24-5/27 with hyperglycemia hyperosmolar syndrome  - Hgb A1C 9.2 on 5/20/22  - Endocrine following, on insulin gtt    CKD (chronic kidney disease) stage 3, GFR 30-59 ml/min  SCr baseline ~ 1.5-1.7    Acute on chronic combined systolic and diastolic congestive heart failure, NYHA class 4  - NIDCM.  - Was not evaluated for OHTx as he quit smoking in April 2022.   -Diuresed well on Lasix drip;Transition to PO diuretics.  (has had allergic reactions to po Bumex and Torsemide)  - Continue 80 mg PO lasix bid.   - GDMT: Spironolactone   - See LVAD        Jeff Spencer PA-C  Heart Transplant  [facility] - Cardiology Stepdown

## 2022-07-24 NOTE — SUBJECTIVE & OBJECTIVE
Interval History: No acute events. No complaints this AM. Now on PO diuretics w/ 80 mg lasix bid. Was net negative 925cc w/ 1.3L UOP documented yesterday, although this was with a AM IVP of lasix prior to being transitioned. Will see how he does today on purely PO lasix.     Continuous Infusions:   sodium chloride 0.9% 5 mL/hr at 06/27/22 0055    sodium chloride 0.9% Stopped (07/18/22 2000)    DOBUTamine IV infusion (non-titrating) 2.5 mcg/kg/min (07/23/22 1542)     Scheduled Meds:   acetaminophen  650 mg Oral Q8H    busPIRone  7.5 mg Oral Daily    famotidine  40 mg Oral Daily    ferrous gluconate  324 mg Oral Daily with breakfast    furosemide  80 mg Oral BID    gabapentin  100 mg Oral TID    insulin aspart U-100  10 Units Subcutaneous TIDWM    insulin detemir U-100  22 Units Subcutaneous QHS    INV aspirin/placebo  100 mg Oral Daily    levETIRAcetam  1,000 mg Oral BID    mirtazapine  15 mg Oral Nightly    polyethylene glycol  17 g Oral Daily    potassium chloride  40 mEq Oral TID    sodium chloride 0.9%  10 mL Intravenous Q6H    spironolactone  25 mg Oral Daily    tiZANidine  2 mg Oral Q8H    warfarin  3 mg Oral Daily     PRN Meds:albuterol sulfate, bisacodyL, dextrose 10%, dextrose 10%, docusate sodium, glucagon (human recombinant), glucose, glucose, insulin aspart U-100, insulin aspart U-100, LIDOcaine HCL 20 mg/ml (2%), magnesium sulfate IVPB, ondansetron, oxyCODONE, polyethylene glycol, promethazine (PHENERGAN) IVPB, sodium chloride 0.9%, sodium chloride 0.9%, Flushing PICC Protocol **AND** sodium chloride 0.9% **AND** sodium chloride 0.9%, sodium chloride 0.9%, traMADoL    Review of patient's allergies indicates:   Allergen Reactions    Aspirin Other (See Comments)     Mr. Thacker is enrolled in Dr. Paige's Alon Trial and cannot have any aspirin and/or aspirin-containing products. DO NOT cancel any orders for INV Aspirin 100 mg/Placebo. If you have questions, please contact Isabel @ [phone],  [phone],[email or web address], secure chat or MS Teams message.    Bumex [bumetanide] Hives    Lactose Diarrhea     Other reaction(s): Abdominal distension, gaseous    Torsemide Hives     Objective:     Vital Signs (Most Recent):  Temp: 97.3 °F (36.3 °C) (07/24/22 0743)  Pulse: 88 (07/24/22 0800)  Resp: 18 (07/24/22 0743)  BP: (!) 90/0 (07/24/22 0800)  SpO2: 99 % (07/24/22 0743)   Vital Signs (24h Range):  Temp:  [97.3 °F (36.3 °C)-98.4 °F (36.9 °C)] 97.3 °F (36.3 °C)  Pulse:  [] 88  Resp:  [18-20] 18  SpO2:  [95 %-100 %] 99 %  BP: (76-96)/(0-70) 90/0     Patient Vitals for the past 72 hrs (Last 3 readings):   Weight   07/24/22 0800 85.7 kg (188 lb 15 oz)   07/23/22 0700 82.8 kg (182 lb 8.7 oz)   07/22/22 0728 82.7 kg (182 lb 5.1 oz)       Body mass index is 23.62 kg/m².      Intake/Output Summary (Last 24 hours) at 7/24/2022 1118  Last data filed at 7/24/2022 0500  Gross per 24 hour   Intake 450 ml   Output 1025 ml   Net -575 ml       Hemodynamic Parameters: CVP 10  CVP:  [14 mmHg] 14 mmHg    Telemetry: ST    Physical Exam  Vitals and nursing note reviewed.   Constitutional:       Appearance: Normal appearance.   HENT:      Head: Normocephalic and atraumatic.   Eyes:      Extraocular Movements: Extraocular movements intact.      Conjunctiva/sclera: Conjunctivae normal.   Neck:      Comments: Neck veins not noticeably elevated.   Cardiovascular:      Rate and Rhythm: Regular rhythm. Tachycardia present.      Comments: Smooth VAD hum  Pulmonary:      Breath sounds: Normal breath sounds.   Abdominal:      Palpations: Abdomen is soft.   Musculoskeletal:         General: No swelling.      Cervical back: Neck supple.      Right lower leg: No edema.      Left lower leg: No edema.   Skin:     General: Skin is dry.      Capillary Refill: Capillary refill takes 2 to 3 seconds.   Neurological:      General: No focal deficit present.      Mental Status: He is alert and oriented to person, place, and time.       Motor: No weakness.   Psychiatric:         Mood and Affect: Mood normal.         Behavior: Behavior normal.         Thought Content: Thought content normal.         Judgment: Judgment normal.       Significant Labs:  CBC:  Recent Labs   Lab 07/19/22  0228 07/20/22  1215 07/22/22  0424 07/23/22  0424   WBC 20.07* 12.44 10.69  --    RBC 2.99* 3.10* 3.28*  --    HGB 7.4* 7.8* 8.2*  --    HCT 23.7* 25.9* 26.3* 29*    459* 514*  --    MCV 79* 84 80*  --    MCH 24.7* 25.2* 25.0*  --    MCHC 31.2* 30.1* 31.2*  --        BNP:  Recent Labs   Lab 07/18/22  0311 07/20/22  0358 07/22/22  0424   * 496* 318*       CMP:  Recent Labs   Lab 07/22/22  0424 07/22/22  1849 07/23/22 0427 07/23/22  1731 07/24/22  0508   *  --  118*  --  133*   CALCIUM 9.5  --  10.0  --  9.9   ALBUMIN 3.1*  --  3.3*  --  3.1*   PROT 8.4  --  8.4  --  7.9     --  136  --  133*   K 3.4*  3.4*   < > 3.7  3.7 4.2 4.1  4.1   CO2 28  --  26  --  28   CL 98  --  99  --  100   BUN 12  --  14  --  15   CREATININE 1.5*  --  1.7*  --  1.6*   ALKPHOS 146*  --  143*  --  136*   ALT 16  --  17  --  13   AST 24  --  29  --  26   BILITOT 1.1*  --  1.0  --  0.8    < > = values in this interval not displayed.        Coagulation:   Recent Labs   Lab 07/20/22  0358 07/21/22  0430 07/22/22  0424 07/23/22  0427 07/24/22  0508   INR 2.6* 1.8* 2.0* 3.0* 2.5*   APTT 34.5* 29.3 27.3  --   --        LDH:  Recent Labs   Lab 07/22/22  0424 07/23/22  0427 07/24/22  0508   * 283* 260       Microbiology:  Microbiology Results (last 7 days)       Procedure Component Value Units Date/Time    Blood culture [[id number]] Collected: 07/19/22 0006    Order Status: Completed Specimen: Blood from Peripheral, Antecubital, Left Updated: 07/24/22 0612     Blood Culture, Routine No growth after 5 days.            I have reviewed all pertinent labs within the past 24 hours.    Estimated Creatinine Clearance: 75.6 mL/min (A) (based on SCr of 1.6 mg/dL  (H)).    Diagnostic Results:  I have reviewed and interpreted all pertinent imaging results/findings within the past 24 hours.

## 2022-07-24 NOTE — PROGRESS NOTES
LVAD dressing change completed by his partner Page using kit. Technique corrected to help maintain sterility. DLES is clean,dry,intact with no drainage or frays/kinks in driveline. DLES is a 1. Pt tolerated change well.  Next dressing change due 7/24. Page was not checked off on being able to independently complete an LVAD dressing change.

## 2022-07-24 NOTE — ASSESSMENT & PLAN NOTE
- NIDCM.  - Was not evaluated for OHTx as he quit smoking in April 2022.   -Diuresed well on Lasix drip;Transition to PO diuretics.  (has had allergic reactions to po Bumex and Torsemide)  - Continue 80 mg PO lasix bid.   - GDMT: Spironolactone   - See LVAD

## 2022-07-24 NOTE — ASSESSMENT & PLAN NOTE
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  - HTS primary   - GASTON trial   - INR was supratherapeuitc and coumadin was held. Goal 2.0-3.0 INR is 3.0 today.  Pharmacy to dose   - LDH stable  - CVP 14, SVO2: 55%. Will see how he does on PO diuretics today. (was net -ruby yesterday but transitioned him mid day so he got a AM IV push.)  -RHC with speed 5100 RA: 14, RV: 41/8 (14), PA: 42/17 (25), PWP: 16/17 (15), CO: 5.32, CI: 2.51  - ECHO 7/19 with speed at 5100- EF: 10%, LVEDD: 6mc (up from 4.24 with speed at 5200)  - New echo pending today.       Procedure: Device Interrogation Including analysis of device parameters  Current Settings: Ventricular Assist Device  Review of device function is stable    TXP LVAD INTERROGATIONS 7/24/2022 7/24/2022 7/24/2022 7/23/2022 7/23/2022 7/23/2022 7/23/2022   Type HeartMate3 HeartMate3 HeartMate3 HeartMate3 HeartMate3 HeartMate3 HeartMate3   Flow 3.9 3.8 4.0 4.1 4.2 3.6 3.7   Speed 5100 5100 5100 5150 5100 5100 5100   PI 5.5 4.9 4.5 4.0 5.7 6.0 6.1   Power (Serna) 3.7 3.6 3.6 3.7 3.7 3.8 3.6   LSL - - - - - 4700 4700   Pulsatility - Intermittent pulse Intermittent pulse Intermittent pulse - - -

## 2022-07-25 DIAGNOSIS — Z95.811 LVAD (LEFT VENTRICULAR ASSIST DEVICE) PRESENT: Primary | ICD-10-CM

## 2022-07-25 PROBLEM — Z00.6 EXAMINATION OF PARTICIPANT IN CLINICAL TRIAL: Status: ACTIVE | Noted: 2022-07-25

## 2022-07-25 LAB
ALBUMIN SERPL BCP-MCNC: 3.1 G/DL (ref 3.5–5.2)
ALLENS TEST: ABNORMAL
ALLENS TEST: ABNORMAL
ALP SERPL-CCNC: 132 U/L (ref 55–135)
ALT SERPL W/O P-5'-P-CCNC: 15 U/L (ref 10–44)
ANION GAP SERPL CALC-SCNC: 8 MMOL/L (ref 8–16)
AST SERPL-CCNC: 27 U/L (ref 10–40)
BASOPHILS # BLD AUTO: 0.05 K/UL (ref 0–0.2)
BASOPHILS NFR BLD: 0.5 % (ref 0–1.9)
BILIRUB SERPL-MCNC: 0.7 MG/DL (ref 0.1–1)
BNP SERPL-MCNC: 337 PG/ML (ref 0–99)
BUN SERPL-MCNC: 13 MG/DL (ref 6–20)
CALCIUM SERPL-MCNC: 9.6 MG/DL (ref 8.7–10.5)
CHLORIDE SERPL-SCNC: 102 MMOL/L (ref 95–110)
CO2 SERPL-SCNC: 26 MMOL/L (ref 23–29)
CREAT SERPL-MCNC: 1.4 MG/DL (ref 0.5–1.4)
DELSYS: ABNORMAL
DELSYS: ABNORMAL
DIFFERENTIAL METHOD: ABNORMAL
EOSINOPHIL # BLD AUTO: 0.3 K/UL (ref 0–0.5)
EOSINOPHIL NFR BLD: 2.8 % (ref 0–8)
ERYTHROCYTE [DISTWIDTH] IN BLOOD BY AUTOMATED COUNT: 18.6 % (ref 11.5–14.5)
EST. GFR  (AFRICAN AMERICAN): >60 ML/MIN/1.73 M^2
EST. GFR  (NON AFRICAN AMERICAN): >60 ML/MIN/1.73 M^2
FIO2: 21
GLUCOSE SERPL-MCNC: 128 MG/DL (ref 70–110)
HCO3 UR-SCNC: 26.4 MMOL/L (ref 24–28)
HCO3 UR-SCNC: 28.5 MMOL/L (ref 24–28)
HCT VFR BLD AUTO: 26.7 % (ref 40–54)
HGB BLD-MCNC: 8 G/DL (ref 14–18)
IMM GRANULOCYTES # BLD AUTO: 0.02 K/UL (ref 0–0.04)
IMM GRANULOCYTES NFR BLD AUTO: 0.2 % (ref 0–0.5)
INR PPP: 2.4 (ref 0.8–1.2)
LDH SERPL L TO P-CCNC: 244 U/L (ref 110–260)
LYMPHOCYTES # BLD AUTO: 1.9 K/UL (ref 1–4.8)
LYMPHOCYTES NFR BLD: 19.6 % (ref 18–48)
MAGNESIUM SERPL-MCNC: 1.6 MG/DL (ref 1.6–2.6)
MAGNESIUM SERPL-MCNC: 1.7 MG/DL (ref 1.6–2.6)
MCH RBC QN AUTO: 24.6 PG (ref 27–31)
MCHC RBC AUTO-ENTMCNC: 30 G/DL (ref 32–36)
MCV RBC AUTO: 82 FL (ref 82–98)
MODE: ABNORMAL
MONOCYTES # BLD AUTO: 0.8 K/UL (ref 0.3–1)
MONOCYTES NFR BLD: 8.4 % (ref 4–15)
NEUTROPHILS # BLD AUTO: 6.5 K/UL (ref 1.8–7.7)
NEUTROPHILS NFR BLD: 68.5 % (ref 38–73)
NRBC BLD-RTO: 0 /100 WBC
PCO2 BLDA: 46.6 MMHG (ref 35–45)
PCO2 BLDA: 51.7 MMHG (ref 35–45)
PH SMN: 7.35 [PH] (ref 7.35–7.45)
PH SMN: 7.36 [PH] (ref 7.35–7.45)
PLATELET # BLD AUTO: 511 K/UL (ref 150–450)
PMV BLD AUTO: 8.7 FL (ref 9.2–12.9)
PO2 BLDA: 29 MMHG (ref 40–60)
PO2 BLDA: 30 MMHG (ref 40–60)
POC BE: 1 MMOL/L
POC BE: 3 MMOL/L
POC SATURATED O2: 51 % (ref 95–100)
POC SATURATED O2: 55 % (ref 95–100)
POC TCO2: 28 MMOL/L (ref 24–29)
POC TCO2: 30 MMOL/L (ref 24–29)
POCT GLUCOSE: 103 MG/DL (ref 70–110)
POCT GLUCOSE: 142 MG/DL (ref 70–110)
POCT GLUCOSE: 142 MG/DL (ref 70–110)
POCT GLUCOSE: 147 MG/DL (ref 70–110)
POCT GLUCOSE: 157 MG/DL (ref 70–110)
POCT GLUCOSE: 158 MG/DL (ref 70–110)
POCT GLUCOSE: 180 MG/DL (ref 70–110)
POTASSIUM SERPL-SCNC: 4.1 MMOL/L (ref 3.5–5.1)
POTASSIUM SERPL-SCNC: 4.1 MMOL/L (ref 3.5–5.1)
POTASSIUM SERPL-SCNC: 4.3 MMOL/L (ref 3.5–5.1)
PROT SERPL-MCNC: 8 G/DL (ref 6–8.4)
PROTHROMBIN TIME: 24.4 SEC (ref 9–12.5)
RBC # BLD AUTO: 3.25 M/UL (ref 4.6–6.2)
SAMPLE: ABNORMAL
SAMPLE: ABNORMAL
SITE: ABNORMAL
SITE: ABNORMAL
SODIUM SERPL-SCNC: 136 MMOL/L (ref 136–145)
SP02: 99
WBC # BLD AUTO: 9.48 K/UL (ref 3.9–12.7)

## 2022-07-25 PROCEDURE — 25000003 PHARM REV CODE 250

## 2022-07-25 PROCEDURE — 99233 SBSQ HOSP IP/OBS HIGH 50: CPT | Mod: ,,, | Performed by: PHYSICIAN ASSISTANT

## 2022-07-25 PROCEDURE — 99233 PR SUBSEQUENT HOSPITAL CARE,LEVL III: ICD-10-PCS | Mod: ,,, | Performed by: PHYSICIAN ASSISTANT

## 2022-07-25 PROCEDURE — 25000003 PHARM REV CODE 250: Performed by: PHYSICIAN ASSISTANT

## 2022-07-25 PROCEDURE — 97535 SELF CARE MNGMENT TRAINING: CPT

## 2022-07-25 PROCEDURE — 20600001 HC STEP DOWN PRIVATE ROOM

## 2022-07-25 PROCEDURE — 85610 PROTHROMBIN TIME: CPT | Performed by: PHYSICIAN ASSISTANT

## 2022-07-25 PROCEDURE — 27000248 HC VAD-ADDITIONAL DAY

## 2022-07-25 PROCEDURE — 99233 SBSQ HOSP IP/OBS HIGH 50: CPT | Mod: ,,, | Performed by: INTERNAL MEDICINE

## 2022-07-25 PROCEDURE — 63600175 PHARM REV CODE 636 W HCPCS: Performed by: PHYSICIAN ASSISTANT

## 2022-07-25 PROCEDURE — 80053 COMPREHEN METABOLIC PANEL: CPT

## 2022-07-25 PROCEDURE — 25000003 PHARM REV CODE 250: Performed by: STUDENT IN AN ORGANIZED HEALTH CARE EDUCATION/TRAINING PROGRAM

## 2022-07-25 PROCEDURE — 83735 ASSAY OF MAGNESIUM: CPT | Performed by: INTERNAL MEDICINE

## 2022-07-25 PROCEDURE — 97530 THERAPEUTIC ACTIVITIES: CPT

## 2022-07-25 PROCEDURE — A4216 STERILE WATER/SALINE, 10 ML: HCPCS | Performed by: THORACIC SURGERY (CARDIOTHORACIC VASCULAR SURGERY)

## 2022-07-25 PROCEDURE — 97116 GAIT TRAINING THERAPY: CPT

## 2022-07-25 PROCEDURE — 25000003 PHARM REV CODE 250: Performed by: NURSE PRACTITIONER

## 2022-07-25 PROCEDURE — 25000003 PHARM REV CODE 250: Performed by: THORACIC SURGERY (CARDIOTHORACIC VASCULAR SURGERY)

## 2022-07-25 PROCEDURE — 83615 LACTATE (LD) (LDH) ENZYME: CPT | Performed by: THORACIC SURGERY (CARDIOTHORACIC VASCULAR SURGERY)

## 2022-07-25 PROCEDURE — 84132 ASSAY OF SERUM POTASSIUM: CPT | Performed by: INTERNAL MEDICINE

## 2022-07-25 PROCEDURE — 99232 PR SUBSEQUENT HOSPITAL CARE,LEVL II: ICD-10-PCS | Mod: ,,, | Performed by: NURSE PRACTITIONER

## 2022-07-25 PROCEDURE — 25000003 PHARM REV CODE 250: Performed by: INTERNAL MEDICINE

## 2022-07-25 PROCEDURE — 85025 COMPLETE CBC W/AUTO DIFF WBC: CPT | Performed by: PHYSICIAN ASSISTANT

## 2022-07-25 PROCEDURE — 93750 PR INTERROGATE VENT ASSIST DEV, IN PERSON, W PHYSICIAN ANALYSIS: ICD-10-PCS | Mod: ,,, | Performed by: INTERNAL MEDICINE

## 2022-07-25 PROCEDURE — 99233 PR SUBSEQUENT HOSPITAL CARE,LEVL III: ICD-10-PCS | Mod: ,,, | Performed by: INTERNAL MEDICINE

## 2022-07-25 PROCEDURE — 93750 INTERROGATION VAD IN PERSON: CPT | Mod: ,,, | Performed by: INTERNAL MEDICINE

## 2022-07-25 PROCEDURE — 83880 ASSAY OF NATRIURETIC PEPTIDE: CPT | Performed by: STUDENT IN AN ORGANIZED HEALTH CARE EDUCATION/TRAINING PROGRAM

## 2022-07-25 PROCEDURE — 99232 SBSQ HOSP IP/OBS MODERATE 35: CPT | Mod: ,,, | Performed by: NURSE PRACTITIONER

## 2022-07-25 PROCEDURE — 25000003 PHARM REV CODE 250: Performed by: ANESTHESIOLOGY

## 2022-07-25 PROCEDURE — 99900035 HC TECH TIME PER 15 MIN (STAT)

## 2022-07-25 RX ORDER — LANOLIN ALCOHOL/MO/W.PET/CERES
400 CREAM (GRAM) TOPICAL 2 TIMES DAILY
Status: DISCONTINUED | OUTPATIENT
Start: 2022-07-25 | End: 2022-07-26

## 2022-07-25 RX ORDER — MILRINONE LACTATE 0.2 MG/ML
0.25 INJECTION, SOLUTION INTRAVENOUS CONTINUOUS
Status: DISCONTINUED | OUTPATIENT
Start: 2022-07-25 | End: 2022-07-28 | Stop reason: HOSPADM

## 2022-07-25 RX ADMIN — TIZANIDINE 2 MG: 2 TABLET ORAL at 06:07

## 2022-07-25 RX ADMIN — SPIRONOLACTONE 25 MG: 25 TABLET, FILM COATED ORAL at 08:07

## 2022-07-25 RX ADMIN — GABAPENTIN 100 MG: 100 CAPSULE ORAL at 08:07

## 2022-07-25 RX ADMIN — DOBUTAMINE 2.5 MCG/KG/MIN: 12.5 INJECTION, SOLUTION, CONCENTRATE INTRAVENOUS at 04:07

## 2022-07-25 RX ADMIN — MIRTAZAPINE 15 MG: 15 TABLET, FILM COATED ORAL at 08:07

## 2022-07-25 RX ADMIN — Medication 10 ML: at 12:07

## 2022-07-25 RX ADMIN — TIZANIDINE 2 MG: 2 TABLET ORAL at 09:07

## 2022-07-25 RX ADMIN — Medication 10 ML: at 05:07

## 2022-07-25 RX ADMIN — TIZANIDINE 2 MG: 2 TABLET ORAL at 01:07

## 2022-07-25 RX ADMIN — ACETAMINOPHEN 650 MG: 325 TABLET ORAL at 06:07

## 2022-07-25 RX ADMIN — Medication 10 ML: at 06:07

## 2022-07-25 RX ADMIN — ACETAMINOPHEN 650 MG: 325 TABLET ORAL at 01:07

## 2022-07-25 RX ADMIN — OXYCODONE HYDROCHLORIDE 10 MG: 10 TABLET ORAL at 12:07

## 2022-07-25 RX ADMIN — POTASSIUM CHLORIDE 40 MEQ: 20 TABLET, EXTENDED RELEASE ORAL at 02:07

## 2022-07-25 RX ADMIN — LEVETIRACETAM 1000 MG: 500 TABLET, FILM COATED ORAL at 08:07

## 2022-07-25 RX ADMIN — GABAPENTIN 100 MG: 100 CAPSULE ORAL at 02:07

## 2022-07-25 RX ADMIN — WARFARIN SODIUM 3 MG: 3 TABLET ORAL at 05:07

## 2022-07-25 RX ADMIN — POTASSIUM CHLORIDE 40 MEQ: 20 TABLET, EXTENDED RELEASE ORAL at 08:07

## 2022-07-25 RX ADMIN — BUSPIRONE HYDROCHLORIDE 7.5 MG: 5 TABLET ORAL at 06:07

## 2022-07-25 RX ADMIN — FUROSEMIDE 80 MG: 80 TABLET ORAL at 08:07

## 2022-07-25 RX ADMIN — Medication 400 MG: at 08:07

## 2022-07-25 RX ADMIN — FAMOTIDINE 40 MG: 20 TABLET ORAL at 08:07

## 2022-07-25 RX ADMIN — MILRINONE LACTATE IN DEXTROSE 0.25 MCG/KG/MIN: 200 INJECTION, SOLUTION INTRAVENOUS at 01:07

## 2022-07-25 RX ADMIN — ACETAMINOPHEN 650 MG: 325 TABLET ORAL at 09:07

## 2022-07-25 RX ADMIN — INSULIN ASPART 2 UNITS: 100 INJECTION, SOLUTION INTRAVENOUS; SUBCUTANEOUS at 08:07

## 2022-07-25 RX ADMIN — INSULIN ASPART 10 UNITS: 100 INJECTION, SOLUTION INTRAVENOUS; SUBCUTANEOUS at 12:07

## 2022-07-25 RX ADMIN — OXYCODONE HYDROCHLORIDE 10 MG: 10 TABLET ORAL at 08:07

## 2022-07-25 RX ADMIN — Medication 324 MG: at 08:07

## 2022-07-25 RX ADMIN — INSULIN ASPART 10 UNITS: 100 INJECTION, SOLUTION INTRAVENOUS; SUBCUTANEOUS at 05:07

## 2022-07-25 RX ADMIN — INSULIN DETEMIR 22 UNITS: 100 INJECTION, SOLUTION SUBCUTANEOUS at 08:07

## 2022-07-25 RX ADMIN — FUROSEMIDE 80 MG: 80 TABLET ORAL at 05:07

## 2022-07-25 NOTE — PT/OT/SLP PROGRESS
Occupational Therapy  Partial Co-Treatment & Updated POC    Name: Kevan Queen  MRN: [id number]  Admitting Diagnosis:  LVAD (left ventricular assist device) present  4 Days Post-Op    Recommendations:     Discharge Recommendations: home with home health  Discharge Equipment Recommendations:   (TBD)  Barriers to discharge:  None    Assessment:     Kevan Queen is a 37 y.o. male with a medical diagnosis of LVAD (left ventricular assist device) present. Pt presents with good tolerance for therapy on this date and was motivated to participate in therapy. Pt tolerated a variety of tasks and positions with VSS and without s/s. Pt reported L calf pain following functional mobility and during home management task; rated pain 10/10; negative Marisol Test; nsg notified. Anticipate pt will progress well with continued OT services to address functional endurance and strength and will be ready for d/c home with significant other support when medically stable with addition of HH services. Plan to see patient to ensure consistency with performance with plan to d/c pt d/t pt meeting all goals.    Performance deficits affecting function are weakness, impaired endurance, impaired self care skills, impaired functional mobility, decreased lower extremity function, decreased upper extremity function, pain, impaired cardiopulmonary response to activity.     [place] completed to optimize functional safety and performance d/t decreased activity tolerance.    Rehab Prognosis:  Good; patient would benefit from acute skilled OT services to address these deficits and reach maximum level of function.       Pt LVAD on wall power at start of session with pt able to complete transfer to battery power with supervision. Pt remained on battery power at end of session in anticipation for functional mobility throughout day with significant other.    Plan:     Patient to be seen 3 x/week to address the above listed problems via self-care/home management,  therapeutic activities, therapeutic exercises  · Plan of Care Expires: 08/02/22  · Plan of Care Reviewed with: patient, significant other    Subjective   Pt agreeable to therapy.    Pain/Comfort:  · Pain Rating 1: 10/10  · Location - Side 1: Left  · Location - Orientation 1: lower  · Location 1: leg  · Pain Addressed 1: Reposition, Distraction, Nurse notified    Objective:     Communicated with: Nsg prior to session.  Patient found supine with LVAD, peripheral IV, telemetry upon OT entry to room.    General Precautions: Standard, fall, LVAD, sternal   Orthopedic Precautions:N/A   Braces:    Respiratory Status: Room air     Occupational Performance:     Bed Mobility:    · Patient completed Supine to Sit with supervision     Functional Mobility/Transfers:  · Patient completed Sit <> Stand Transfer with supervision  with  no assistive device   · Functional Mobility: Pt performed sit>stand with Supervision from EOB for functional mobility in hallway with supervision. Pt required seated rest break following functional mobility. Pt performed home management task including making bed with supervision and 1 rest break d/t L calf pain; pt able to perform without LOB or SOB with minimal cues for energy conservation and pacing self.     Activities of Daily Living:  · Lower Body Dressing: supervision seated EOB to don socks and shoes with ties. Pt completed without additional assistance for sequence or balance; pt independently utilized figure-4 technique.   · Pt performed bed making task to simulate anticipated home activities. Pt tolerated well but required 2 seated rest breaks 2/2 L calf pain. Pt able to complete sequence of task with thoroughness and performed reaching overhead and below waist to obtain necessary items.      [facility] 6 Click ADL: 23    Treatment & Education:  Pt educated on OT POC, purpose of OT services and safety with ADLs and functional mobility.   Pt educated on:  - Importance and benefit of OOB  activity  - Modified self-care techniques (figure-4) for dressing  - Sternal precautions (pt performed 3/3 sternal precautions)  - Pt advocacy for care  - Family involvement with care (walking in hallway throughout day with significant other)  - Energy Conservation techniques (pursed-lip breathing, taking rest breaks prn)    Pt LVAD on wall power at start of session with pt able to complete transfer to battery power with supervision. Pt remained on battery power at end of session in anticipation for functional mobility throughout day with significant other. Pt provided with LVAD education regarding documentation of LVAD numbers, rotation of battery purpose and technique. Pt verbalized understanding. Pt required min verbal cues for managing wires in LVAD shoulder consolidation bag.    *Therapeutic activity utilized on this date to address functional strength & endurance in anticipation for improved participation in ADLs. Pt performed functional mobility around hospital hallway to simulate household distances. Pt performed functional reaching and transporting of objects to simulate household tasks in anticipation for d/c home.     Marisol test completed 2/2 pt complaint of L calf pain - no increased temperature, no pain with palpation, no pain with dorsiflexion; Negative for Marisol sign test. Nsg notified of pt pain; nsg at bedside to address pt care at end of session.    Patient left sitting edge of bed with all lines intact, call button in reach, bed alarm off, nsg notified and nsg & significant other presentEducation:      GOALS:   Multidisciplinary Problems     Occupational Therapy Goals        Problem: Occupational Therapy    Goal Priority Disciplines Outcome Interventions   Occupational Therapy Goal     OT, PT/OT Ongoing, Progressing    Description: Goals to be met by: 8/2/22     Patient will increase functional independence with ADLs by performing:    UE Dressing with Modified Price.  LE Dressing with  Modified Page and Assistive Devices as needed.  Grooming while standing at sink with Modified Page.  Toileting from toilet with Modified Page for hygiene and clothing management.   Bathing from  shower chair/bench with Modified Page.  Toilet transfer to toilet with Modified Page.  Increased functional strength to WFL for B UE.  Upper extremity exercise program x15 reps per handout, with assistance as needed.  Pt will be I in all LVAD management.                     Time Tracking:     OT Date of Treatment: 07/25/22  OT Start Time: 0902  OT Stop Time: 0929  OT Total Time (min): 27 min    Billable Minutes:Self Care/Home Management 14  Therapeutic Activity 13               7/25/2022

## 2022-07-25 NOTE — PLAN OF CARE
Pt cooperative with routine care and procedures. Pt VSS. Pt up independently but reminded to call for assistance if increased weakness. Bed locked and in lowest position. Call bell and urinal within reach. Pt remained free from s/s of distress. Pain controlled with scheduled meds. LVAD sterile dressing change completed with Page his partner. SVO2 55. CVP 13. Dobutamine gtt running at 234.25 mcg/kg/min 3.5ml/hr. Dr. Reyes notified of SVO2 below 60 as ordered.      Problem: Adult Inpatient Plan of Care  Goal: Plan of Care Review  Outcome: Ongoing, Progressing  Flowsheets (Taken 7/25/2022 0611)  Plan of Care Reviewed With: patient  Goal: Patient-Specific Goal (Individualized)  Outcome: Ongoing, Progressing  Flowsheets (Taken 7/25/2022 0611)  Anxieties, Fears or Concerns: For family memeber to be checked off on dressing change  Individualized Care Needs: Monitor labs, vs, and pain level  Patient-Specific Goals (Include Timeframe): to have family checkd off on dressing chnage so pt can be discharged by 8/1  Goal: Absence of Hospital-Acquired Illness or Injury  Outcome: Ongoing, Progressing  Goal: Optimal Comfort and Wellbeing  Outcome: Ongoing, Progressing  Goal: Readiness for Transition of Care  Outcome: Ongoing, Progressing     Problem: Diabetes Comorbidity  Goal: Blood Glucose Level Within Targeted Range  Outcome: Ongoing, Progressing     Problem: Infection  Goal: Absence of Infection Signs and Symptoms  Outcome: Ongoing, Progressing     Problem: Fall Injury Risk  Goal: Absence of Fall and Fall-Related Injury  Outcome: Ongoing, Progressing     Problem: Skin Injury Risk Increased  Goal: Skin Health and Integrity  Outcome: Ongoing, Progressing     Problem: Coping Ineffective  Goal: Effective Coping  Outcome: Ongoing, Progressing     Problem: Adjustment to Illness (Heart Failure)  Goal: Optimal Coping  Outcome: Ongoing, Progressing     Problem: Cardiac Output Decreased (Heart Failure)  Goal: Optimal Cardiac  Output  Outcome: Ongoing, Progressing     Problem: Dysrhythmia (Heart Failure)  Goal: Stable Heart Rate and Rhythm  Outcome: Ongoing, Progressing     Problem: Fluid Imbalance (Heart Failure)  Goal: Fluid Balance  Outcome: Ongoing, Progressing     Problem: Functional Ability Impaired (Heart Failure)  Goal: Optimal Functional Ability  Outcome: Ongoing, Progressing     Problem: Oral Intake Inadequate (Heart Failure)  Goal: Optimal Nutrition Intake  Outcome: Ongoing, Progressing     Problem: Respiratory Compromise (Heart Failure)  Goal: Effective Oxygenation and Ventilation  Outcome: Ongoing, Progressing     Problem: Sleep Disordered Breathing (Heart Failure)  Goal: Effective Breathing Pattern During Sleep  Outcome: Ongoing, Progressing     Problem: Adjustment to Device (Ventricular Assist Device)  Goal: Optimal Adjustment to Device  Outcome: Ongoing, Progressing     Problem: Bleeding (Ventricular Assist Device)  Goal: Absence of Bleeding  Outcome: Ongoing, Progressing     Problem: Embolism (Ventricular Assist Device)  Goal: Absence of Embolism Signs and Symptoms  Outcome: Ongoing, Progressing     Problem: Hemodynamic Instability (Ventricular Assist Device)  Goal: Optimal Blood Flow  Outcome: Ongoing, Progressing     Problem: Infection (Ventricular Assist Device)  Goal: Absence of Infection Signs and Symptoms  Outcome: Ongoing, Progressing     Problem: Right-Sided Heart Compromise (Ventricular Assist Device)  Goal: Effective Right-Sided Heart Function  Outcome: Ongoing, Progressing

## 2022-07-25 NOTE — PROGRESS NOTES
Pt's VAD dressing changed by spouse with RN supervision per protocol using kit and sterile technique. Pt's drive line site is clean, dry, suture  intact with scant brown drainage in the inner layer of old dressing; DLES is + (2). No kinks or frays on drive line, secured with melendez anchor. Pt tolerated dressing change well. Next dressing change due 07/26/2022. Unable to check off pt's spouse for LVAD dressing change d/t after put sterile gloves on, spouse touches table. Gloves changed again then continue and finished the dressing change.

## 2022-07-25 NOTE — ASSESSMENT & PLAN NOTE
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  - HTS primary   - GASTON trial   - INR was supratherapeuitc and coumadin was held. Goal 2.0-3.0 INR is 3.0 today.  Pharmacy to dose   - LDH stable  - CVP 13 SVO2: 55%. Will continue po Lasix at current dose  -RHC with speed 5100 RA: 14, RV: 41/8 (14), PA: 42/17 (25), PWP: 16/17 (15), CO: 5.32, CI: 2.51  - ECHO 7/24 with speed at 5100- EF: 10%, LVEDD: 6.3mc   -Will start Primacor today with plans to transition off of  for home       Procedure: Device Interrogation Including analysis of device parameters  Current Settings: Ventricular Assist Device  Review of device function is stable    TXP LVAD INTERROGATIONS 7/25/2022 7/25/2022 7/25/2022 7/24/2022 7/24/2022 7/24/2022 7/24/2022   Type HeartMate3 HeartMate3 HeartMate3 HeartMate3 HeartMate3 HeartMate3 HeartMate3   Flow 3.5 3.7 3.8 4.1 4.3 4.1 3.9   Speed 5100 5100 5200 5100 5100 5100 5100   PI 6.5 6.2 4.6 3.8 3.4 3.5 5.2   Power (Serna) 3.8 3.7 3.7 3.6 3.7 3.6 3.7   LSL 4700 - - - - - -   Pulsatility Pulse Intermittent pulse Intermittent pulse Intermittent pulse - - -

## 2022-07-25 NOTE — PROGRESS NOTES
"[facility] - [facility]iology Stepdown  Endocrinology  Progress Note    Admit Date: 2022     Reason for Consult: Management of  type 2 DM, Hyperglycemia     Surgical Procedure and Date: none    Diabetes diagnosis year: 21    Home Diabetes Medications:  Detemir  23  units daily and Aspart   12  units TIDWM and  Mod dose correction insulin    Lab Results   Component Value Date    HGBA1C 9.2 (H) 2022           How often checking glucose at home? 1-3 x day   BG readings on regimen: 180- up to 300 once  Hypoglycemia on the regimen?  yes once- related to not really eating after taking aspart   Missed doses on regimen?  no    Diabetes Complications include:     Hyperglycemia    Complicating diabetes co morbidities:   History of MI, CHF, and CKD      HPI:   Patient is a 37 y.o. male with a diagnosis of type 2 DM and NIDCM with BiV systolic heart failure. Patient was presented at [facility] 22  and was  approved for VAD. Also recently admitted with [facility]; he had clinic appointment last week to f/u recent admit for hyperglycemic hyperosmolar syndrome but did not come as he was "feeling too bad" presents to  ED with SOB. Endocrinology consulted for BG/ DM management.                Interval HPI:   Overnight events: No acute events overnight. Patient on the CSU in room 316/316 A. Blood glucose stable. BG at and above goal on current insulin regimen (SSI, prandial, and basal insulin ). Steroid use- None. 4 Days Post-Op  Renal function- Normal  Vasopressors-  None       Diet [facility]; Consistent Carbohydrate; Fluid - 1500mL  (Eating outside food)  Eatin%  Nausea: No  Hypoglycemia and intervention: No  Fever: No  TPN and/or TF: No      BP 99/70 (BP Location: Left arm, Patient Position: Lying)   Pulse 90   Temp 98.7 °F (37.1 °C) (Oral)   Resp 16   Ht 6' 3" (1.905 m)   Wt 83.5 kg (184 lb 1.4 oz)   SpO2 99%   BMI 23.01 kg/m²     Labs Reviewed and Include    Recent Labs   Lab 22  0525 "   *   CALCIUM 9.6   ALBUMIN 3.1*   PROT 8.0      K 4.1  4.1   CO2 26      BUN 13   CREATININE 1.4   ALKPHOS 132   ALT 15   AST 27   BILITOT 0.7     Lab Results   Component Value Date    WBC 9.48 07/25/2022    HGB 8.0 (L) 07/25/2022    HCT 26.7 (L) 07/25/2022    MCV 82 07/25/2022     (H) 07/25/2022     No results for input(s): TSH, FREET4 in the last 168 hours.  Lab Results   Component Value Date    HGBA1C 9.2 (H) 05/20/2022       Nutritional status:   Body mass index is 23.01 kg/m².  Lab Results   Component Value Date    ALBUMIN 3.1 (L) 07/25/2022    ALBUMIN 3.1 (L) 07/24/2022    ALBUMIN 3.3 (L) 07/23/2022     Lab Results   Component Value Date    PREALBUMIN 12 (L) 07/17/2022    PREALBUMIN 12 (L) 07/14/2022    PREALBUMIN 12 (L) 07/07/2022       Estimated Creatinine Clearance: 85.3 mL/min (based on SCr of 1.4 mg/dL).    Accu-Checks  Recent Labs     07/23/22  1121 07/23/22  1541 07/23/22  2105 07/24/22  0218 07/24/22  0704 07/24/22  1127 07/24/22  1605 07/24/22  2145 07/25/22  0209 07/25/22  0731   POCTGLUCOSE 157* 142* 108 157* 149* 148* 163* 164* 180* 158*       Current Medications and/or Treatments Impacting Glycemic Control  Immunotherapy:    Immunosuppressants       None          Steroids:   Hormones (From admission, onward)                None          Pressors:    Autonomic Drugs (From admission, onward)                None          Hyperglycemia/Diabetes Medications:   Antihyperglycemics (From admission, onward)                Start     Stop Route Frequency Ordered    07/18/22 1503  insulin aspart U-100 pen 4 Units         -- SubQ As needed (PRN) 07/18/22 1404    07/16/22 2100  insulin detemir U-100 pen 22 Units         -- SubQ Nightly 07/16/22 0636    07/13/22 1130  insulin aspart U-100 pen 10 Units         -- SubQ 3 times daily with meals 07/13/22 1106    07/10/22 1718  insulin aspart U-100 pen 1-10 Units         -- SubQ Before meals & nightly PRN 07/10/22 1616             ASSESSMENT and PLAN    * LVAD (left ventricular assist device) present  Managed per primary team  Avoid hypoglycemia        Uncontrolled diabetes mellitus  Endocrinology consulted for BG management.   BG goal 140-180    - Continue levemir 22 units HS.   - Continue Novolog 10 units TIDWM and prn for BG excursions [facility] (150/25) SSI.  - Continue Novolog 4 units prn for overnight snack  - BG monitoring ac/hs and moderate dose correction scale.   - Hypoglycemia protocol in place.     ** Please notify Endocrine for any change and/or advance in diet**  ** Please call Endocrine for any BG related issues **    Discharge Planning:   TBD. Please notify endocrinology prior to discharge.        Acute on chronic combined systolic and diastolic congestive heart failure, NYHA class 4  Optimize glucose control and  avoid hypoglycemia    Managed per primary.       CKD (chronic kidney disease) stage 3, GFR 30-59 ml/min    Titrate insulin slowly to avoid hypoglycemia as the risk of hypoglycemia increases with decreased creatinine clearance.    Estimated Creatinine Clearance: 85.3 mL/min (based on SCr of 1.4 mg/dL).      Surgical wound present  Optimize BG for surgical wound healing.              Michael Wyman, DNP, FNP  Endocrinology  [facility] - Cardiology Stepdown

## 2022-07-25 NOTE — SUBJECTIVE & OBJECTIVE
"Interval HPI:   Overnight events: No acute events overnight. Patient on the CSU in room 316/316 A. Blood glucose stable. BG at and above goal on current insulin regimen (SSI, prandial, and basal insulin ). Steroid use- None. 4 Days Post-Op  Renal function- Normal  Vasopressors-  None       Diet [facility]; Consistent Carbohydrate; Fluid - 1500mL  (Eating outside food)  Eatin%  Nausea: No  Hypoglycemia and intervention: No  Fever: No  TPN and/or TF: No      BP 99/70 (BP Location: Left arm, Patient Position: Lying)   Pulse 90   Temp 98.7 °F (37.1 °C) (Oral)   Resp 16   Ht 6' 3" (1.905 m)   Wt 83.5 kg (184 lb 1.4 oz)   SpO2 99%   BMI 23.01 kg/m²     Labs Reviewed and Include    Recent Labs   Lab 22  0525   *   CALCIUM 9.6   ALBUMIN 3.1*   PROT 8.0      K 4.1  4.1   CO2 26      BUN 13   CREATININE 1.4   ALKPHOS 132   ALT 15   AST 27   BILITOT 0.7     Lab Results   Component Value Date    WBC 9.48 2022    HGB 8.0 (L) 2022    HCT 26.7 (L) 2022    MCV 82 2022     (H) 2022     No results for input(s): TSH, FREET4 in the last 168 hours.  Lab Results   Component Value Date    HGBA1C 9.2 (H) 2022       Nutritional status:   Body mass index is 23.01 kg/m².  Lab Results   Component Value Date    ALBUMIN 3.1 (L) 2022    ALBUMIN 3.1 (L) 2022    ALBUMIN 3.3 (L) 2022     Lab Results   Component Value Date    PREALBUMIN 12 (L) 2022    PREALBUMIN 12 (L) 2022    PREALBUMIN 12 (L) 2022       Estimated Creatinine Clearance: 85.3 mL/min (based on SCr of 1.4 mg/dL).    Accu-Checks  Recent Labs     22  1121 22  1541 22  2105 22  0218 22  0704 22  1127 22  1605 22  2145 22  0209 22  0731   POCTGLUCOSE 157* 142* 108 157* 149* 148* 163* 164* 180* 158*       Current Medications and/or Treatments Impacting Glycemic Control  Immunotherapy:  "   Immunosuppressants       None          Steroids:   Hormones (From admission, onward)                None          Pressors:    Autonomic Drugs (From admission, onward)                None          Hyperglycemia/Diabetes Medications:   Antihyperglycemics (From admission, onward)                Start     Stop Route Frequency Ordered    07/18/22 1503  insulin aspart U-100 pen 4 Units         -- SubQ As needed (PRN) 07/18/22 1404    07/16/22 2100  insulin detemir U-100 pen 22 Units         -- SubQ Nightly 07/16/22 0636    07/13/22 1130  insulin aspart U-100 pen 10 Units         -- SubQ 3 times daily with meals 07/13/22 1106    07/10/22 1718  insulin aspart U-100 pen 1-10 Units         -- SubQ Before meals & nightly PRN 07/10/22 1618

## 2022-07-25 NOTE — PROGRESS NOTES
[facility] - Cardiology Stepdown  Heart Transplant  Progress Note    Patient Name: Kevan Queen  MRN: [id number]  Admission Date: 6/13/2022  Hospital Length of Stay: 42 days  Attending Physician: Josh Pulido MD  Primary Care Provider: ORALIA Cline  Principal Problem:LVAD (left ventricular assist device) present    Subjective:     Interval History: Patient reports no new complaints today.  He is net negative 1L over the last 24 hours on Lasix 80mg po BID.  CVP: 13, SVO2: 55, CO: 6.16, CI: 2.89, SVR: 753 using CBC from days ago.  Repeat CBC pending.  Plan to discharge on inotropes but can't go out on .  Will start Primacor today and transition  off.  Will continue current dose of Lasix.      Continuous Infusions:   sodium chloride 0.9% 5 mL/hr at 06/27/22 0055    sodium chloride 0.9% Stopped (07/18/22 2000)    DOBUTamine IV infusion (non-titrating) 2.5 mcg/kg/min (07/25/22 0459)    milrinone 20mg/100ml D5W (200mcg/ml)       Scheduled Meds:   acetaminophen  650 mg Oral Q8H    busPIRone  7.5 mg Oral Daily    famotidine  40 mg Oral Daily    ferrous gluconate  324 mg Oral Daily with breakfast    furosemide  80 mg Oral BID    gabapentin  100 mg Oral TID    insulin aspart U-100  10 Units Subcutaneous TIDWM    insulin detemir U-100  22 Units Subcutaneous QHS    INV aspirin/placebo  100 mg Oral Daily    levETIRAcetam  1,000 mg Oral BID    magnesium oxide  400 mg Oral BID    mirtazapine  15 mg Oral Nightly    polyethylene glycol  17 g Oral Daily    potassium chloride  40 mEq Oral TID    sodium chloride 0.9%  10 mL Intravenous Q6H    spironolactone  25 mg Oral Daily    tiZANidine  2 mg Oral Q8H    warfarin  3 mg Oral Daily     PRN Meds:albuterol sulfate, bisacodyL, dextrose 10%, dextrose 10%, docusate sodium, glucagon (human recombinant), glucose, glucose, insulin aspart U-100, insulin aspart U-100, LIDOcaine HCL 20 mg/ml (2%), magnesium sulfate IVPB, ondansetron, oxyCODONE,  polyethylene glycol, promethazine (PHENERGAN) IVPB, sodium chloride 0.9%, sodium chloride 0.9%, Flushing PICC Protocol **AND** sodium chloride 0.9% **AND** sodium chloride 0.9%, sodium chloride 0.9%, traMADoL    Review of patient's allergies indicates:   Allergen Reactions    Aspirin Other (See Comments)     Mr. Thacker is enrolled in Dr. Paige's Alon Trial and cannot have any aspirin and/or aspirin-containing products. DO NOT cancel any orders for INV Aspirin 100 mg/Placebo. If you have questions, please contact Isabel @ [phone], [phone],[email or web address], secure chat or MS Teams message.    Bumex [bumetanide] Hives    Lactose Diarrhea     Other reaction(s): Abdominal distension, gaseous    Torsemide Hives     Objective:     Vital Signs (Most Recent):  Temp: 98.7 °F (37.1 °C) (07/25/22 0750)  Pulse: 90 (07/25/22 0803)  Resp: 18 (07/25/22 1239)  BP: 99/70 (07/25/22 0755)  SpO2: 99 % (07/25/22 0750)   Vital Signs (24h Range):  Temp:  [97 °F (36.1 °C)-98.7 °F (37.1 °C)] 98.7 °F (37.1 °C)  Pulse:  [] 90  Resp:  [12-18] 18  SpO2:  [98 %-100 %] 99 %  BP: ()/(0-71) 99/70     Patient Vitals for the past 72 hrs (Last 3 readings):   Weight   07/25/22 0800 83.5 kg (184 lb 1.4 oz)   07/24/22 0800 85.7 kg (188 lb 15 oz)   07/23/22 0700 82.8 kg (182 lb 8.7 oz)       Body mass index is 23.01 kg/m².      Intake/Output Summary (Last 24 hours) at 7/25/2022 1241  Last data filed at 7/25/2022 0800  Gross per 24 hour   Intake 400 ml   Output 1950 ml   Net -1550 ml       Hemodynamic Parameters: CVP 11       Telemetry: ST    Physical Exam  Vitals and nursing note reviewed.   Constitutional:       Appearance: Normal appearance.   HENT:      Head: Normocephalic and atraumatic.   Eyes:      Extraocular Movements: Extraocular movements intact.      Conjunctiva/sclera: Conjunctivae normal.   Cardiovascular:      Rate and Rhythm: Regular rhythm. Tachycardia present.      Comments: Smooth VAD hum  Pulmonary:       Breath sounds: Normal breath sounds.   Abdominal:      Palpations: Abdomen is soft.   Musculoskeletal:         General: No swelling.      Cervical back: Neck supple.      Right lower leg: No edema.      Left lower leg: No edema.   Skin:     General: Skin is dry.      Capillary Refill: Capillary refill takes 2 to 3 seconds.   Neurological:      General: No focal deficit present.      Mental Status: He is alert and oriented to person, place, and time.      Motor: No weakness.   Psychiatric:         Mood and Affect: Mood normal.         Behavior: Behavior normal.         Thought Content: Thought content normal.         Judgment: Judgment normal.       Significant Labs:  CBC:  Recent Labs   Lab 07/20/22  1215 07/22/22  0424 07/23/22  0424 07/25/22  0854   WBC 12.44 10.69  --  9.48   RBC 3.10* 3.28*  --  3.25*   HGB 7.8* 8.2*  --  8.0*   HCT 25.9* 26.3* 29* 26.7*   * 514*  --  511*   MCV 84 80*  --  82   MCH 25.2* 25.0*  --  24.6*   MCHC 30.1* 31.2*  --  30.0*       BNP:  Recent Labs   Lab 07/20/22  0358 07/22/22  0424 07/25/22  0525   * 318* 337*       CMP:  Recent Labs   Lab 07/23/22  0427 07/23/22  1731 07/24/22  0508 07/24/22  1657 07/25/22  0525   *  --  133*  --  128*   CALCIUM 10.0  --  9.9  --  9.6   ALBUMIN 3.3*  --  3.1*  --  3.1*   PROT 8.4  --  7.9  --  8.0     --  133*  --  136   K 3.7  3.7   < > 4.1  4.1 4.5 4.1  4.1   CO2 26  --  28  --  26   CL 99  --  100  --  102   BUN 14  --  15  --  13   CREATININE 1.7*  --  1.6*  --  1.4   ALKPHOS 143*  --  136*  --  132   ALT 17  --  13  --  15   AST 29  --  26  --  27   BILITOT 1.0  --  0.8  --  0.7    < > = values in this interval not displayed.        Coagulation:   Recent Labs   Lab 07/20/22  0358 07/21/22  0430 07/22/22  0424 07/23/22  0427 07/24/22  0508 07/25/22  0525   INR 2.6* 1.8* 2.0* 3.0* 2.5* 2.4*   APTT 34.5* 29.3 27.3  --   --   --        LDH:  Recent Labs   Lab 07/23/22  0427 07/24/22  0508 07/25/22  0525   * 260  "244       Microbiology:  Microbiology Results (last 7 days)       Procedure Component Value Units Date/Time    Blood culture [[id number]] Collected: 07/19/22 0006    Order Status: Completed Specimen: Blood from Peripheral, Antecubital, Left Updated: 07/24/22 0612     Blood Culture, Routine No growth after 5 days.            I have reviewed all pertinent labs within the past 24 hours.    Estimated Creatinine Clearance: 85.3 mL/min (based on SCr of 1.4 mg/dL).    Diagnostic Results:  I have reviewed and interpreted all pertinent imaging results/findings within the past 24 hours.    Assessment and Plan:     38 yo male with NIDCM with BiV systolic heart failure, on home Milrinone at 0.375 mcg/kg/min, presented at [facility] 4/2/22 ,was not evaluated for OHT as he has recently quit smoking in April 2022 but was approved for VAD with plan to begin OHT evaluation in upcoming months if Mr Queen is stable and suitable for OHT eval (blood group A), 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 Went to ED at [facility] 6/10 but left after waiting 4 hours. Had clinic appointment with us today, but arrived to [facility] early this morning and decided to go to the ED instead. Baseline Lasix dose is 80 mg bid. Reports taking 240 mg qd past 3 days with no improvement. BNP is 1701, up from 898 on 6/2 and 49 on 5/24. sCr is 1.8 with baseline ~ 1.5-1.7. sPO2 on RA is 93%. Wife at bedside    He has been given Lasix 80 mg IVP in the ED with plan to start Lasix gtt at 20 mg/hr           * LVAD (left ventricular assist device) present  - S/P DT HM3 with MVR plus Protek Duo for RV support 6/29 (Grecia)  - RVAD removed with " concern for hemolysis.  - HTS primary   - GASTON trial   - INR was supratherapeuitc and coumadin was held. Goal 2.0-3.0 INR is 3.0 today.  Pharmacy to dose   - LDH stable  - CVP 13 SVO2: 55%. Will continue po Lasix at current dose  -RHC with speed 5100 RA: 14, RV: 41/8 (14), PA: 42/17 (25), PWP: 16/17 (15), CO: 5.32, CI: 2.51  - ECHO 7/24 with speed at 5100- EF: 10%, LVEDD: 6.3mc   -Will start Primacor today with plans to transition off of  for home       Procedure: Device Interrogation Including analysis of device parameters  Current Settings: Ventricular Assist Device  Review of device function is stable    TXP LVAD INTERROGATIONS 7/25/2022 7/25/2022 7/25/2022 7/24/2022 7/24/2022 7/24/2022 7/24/2022   Type HeartMate3 HeartMate3 HeartMate3 HeartMate3 HeartMate3 HeartMate3 HeartMate3   Flow 3.5 3.7 3.8 4.1 4.3 4.1 3.9   Speed 5100 5100 5200 5100 5100 5100 5100   PI 6.5 6.2 4.6 3.8 3.4 3.5 5.2   Power (Serna) 3.8 3.7 3.7 3.6 3.7 3.6 3.7   LSL 4700 - - - - - -   Pulsatility Pulse Intermittent pulse Intermittent pulse Intermittent pulse - - -       Acute on chronic combined systolic and diastolic heart failure, NYHA class 4  - NIDCM.  - Was not evaluated for OHTx as he quit smoking in April 2022.   -Diuresed well on Lasix drip;Transition to PO diuretics.  (has had allergic reactions to po Bumex and Torsemide)  - Continue 80 mg PO lasix bid.   - GDMT: Spironolactone   - See LVAD    Staphylococcus epidermidis bacteremia  - Blood cultures 6/20 and repeat 6/21 positive for Staph Epi. One of two blood cultures from 6/23 positive for Staph (taken prior to line exchange). Repeat cultures sent 6/25 NGTD  - IJ exchanged on 6/23, IABP exchanged 6/23  - ID recommended 14 day course of vancomycin from VAD implantation on 6/29 with end date: 7/12/22. Vancomycin discontinued per CTS with concerns for elevated sCr. ID with new recs to complete 7d course of daptomycin, which was completed 7/7/22    CKD (chronic kidney disease) stage  3, GFR 30-59 ml/min  SCr baseline ~ 1.5-1.7    Leukocytosis  -on empiric Vanc and Cefepime for today of 5 days (end date 7/23)  -ID consulted appreciate their recommendations  -WBC improved    Hyponatremia  -Hypervolemic, now improved  -Discontinued salt tablets   -Improvement in Na with diuresis    Temporal lobe seizure  -No post ictal period  -Appreciate Neurology consult.  EEG ordered for 1 hour.  Study was done; findings consistent with L temporal lobe seizure.   -orthostatic BP negative  -device interrogation negative j  - Neurology starting Keppra 1000 mg BID for partial temporal lobe seizure on EEG.       Uncontrolled diabetes mellitus  Admitted 5/24-5/27 with hyperglycemia hyperosmolar syndrome  - Hgb A1C 9.2 on 5/20/22  - Endocrine following, on insulin gtt    Tingling in extremities  - Pt reports paresthesias in right toes and fingers x 3 days   - Possible radiculopathy vs electrolyte abnormality vs neurovascular etiology   - Consulted General Neurology, suspect compression from surgical positioning/carpal tunnel syndrome. Recommended brace to L hand/wrist. If no improvement, will need outpatient EMG/NCS        DEYA Martinez  Heart Transplant  [facility] - Cardiology Stepdown

## 2022-07-25 NOTE — PROGRESS NOTES
LVAD dressing change completed by his partner Niharika using kit. Technique corrected to help maintain sterility. DLES is clean,dry,intact with no drainage or frays/kinks in driveline. DLES is a 1. Pt tolerated change well.  Next dressing change due 7/25. Niharika was not checked off on being able to independently complete an LVAD dressing change. Niharika struggles with maintaining sterility due to hand placement while wearing sterile gloves.

## 2022-07-25 NOTE — SUBJECTIVE & OBJECTIVE
Interval History: Patient reports no new complaints today.  He is net negative 1L over the last 24 hours on Lasix 80mg po BID.  CVP: 13, SVO2: 55, CO: 6.16, CI: 2.89, SVR: 753 using CBC from days ago.  Repeat CBC pending.  Plan to discharge on inotropes but can't go out on .  Will start Primacor today and transition  off.  Will continue current dose of Lasix.      Continuous Infusions:   sodium chloride 0.9% 5 mL/hr at 06/27/22 0055    sodium chloride 0.9% Stopped (07/18/22 2000)    DOBUTamine IV infusion (non-titrating) 2.5 mcg/kg/min (07/25/22 0459)    milrinone 20mg/100ml D5W (200mcg/ml)       Scheduled Meds:   acetaminophen  650 mg Oral Q8H    busPIRone  7.5 mg Oral Daily    famotidine  40 mg Oral Daily    ferrous gluconate  324 mg Oral Daily with breakfast    furosemide  80 mg Oral BID    gabapentin  100 mg Oral TID    insulin aspart U-100  10 Units Subcutaneous TIDWM    insulin detemir U-100  22 Units Subcutaneous QHS    INV aspirin/placebo  100 mg Oral Daily    levETIRAcetam  1,000 mg Oral BID    magnesium oxide  400 mg Oral BID    mirtazapine  15 mg Oral Nightly    polyethylene glycol  17 g Oral Daily    potassium chloride  40 mEq Oral TID    sodium chloride 0.9%  10 mL Intravenous Q6H    spironolactone  25 mg Oral Daily    tiZANidine  2 mg Oral Q8H    warfarin  3 mg Oral Daily     PRN Meds:albuterol sulfate, bisacodyL, dextrose 10%, dextrose 10%, docusate sodium, glucagon (human recombinant), glucose, glucose, insulin aspart U-100, insulin aspart U-100, LIDOcaine HCL 20 mg/ml (2%), magnesium sulfate IVPB, ondansetron, oxyCODONE, polyethylene glycol, promethazine (PHENERGAN) IVPB, sodium chloride 0.9%, sodium chloride 0.9%, Flushing PICC Protocol **AND** sodium chloride 0.9% **AND** sodium chloride 0.9%, sodium chloride 0.9%, traMADoL    Review of patient's allergies indicates:   Allergen Reactions    Aspirin Other (See Comments)     Mr. Thacker is enrolled in Dr. Paige's Alon Trial and cannot have  any aspirin and/or aspirin-containing products. DO NOT cancel any orders for INV Aspirin 100 mg/Placebo. If you have questions, please contact Isabel @ [phone], [phone],[email or web address], secure chat or MS Teams message.    Bumex [bumetanide] Hives    Lactose Diarrhea     Other reaction(s): Abdominal distension, gaseous    Torsemide Hives     Objective:     Vital Signs (Most Recent):  Temp: 98.7 °F (37.1 °C) (07/25/22 0750)  Pulse: 90 (07/25/22 0803)  Resp: 18 (07/25/22 1239)  BP: 99/70 (07/25/22 0755)  SpO2: 99 % (07/25/22 0750)   Vital Signs (24h Range):  Temp:  [97 °F (36.1 °C)-98.7 °F (37.1 °C)] 98.7 °F (37.1 °C)  Pulse:  [] 90  Resp:  [12-18] 18  SpO2:  [98 %-100 %] 99 %  BP: ()/(0-71) 99/70     Patient Vitals for the past 72 hrs (Last 3 readings):   Weight   07/25/22 0800 83.5 kg (184 lb 1.4 oz)   07/24/22 0800 85.7 kg (188 lb 15 oz)   07/23/22 0700 82.8 kg (182 lb 8.7 oz)       Body mass index is 23.01 kg/m².      Intake/Output Summary (Last 24 hours) at 7/25/2022 1241  Last data filed at 7/25/2022 0800  Gross per 24 hour   Intake 400 ml   Output 1950 ml   Net -1550 ml       Hemodynamic Parameters: CVP 11       Telemetry: ST    Physical Exam  Vitals and nursing note reviewed.   Constitutional:       Appearance: Normal appearance.   HENT:      Head: Normocephalic and atraumatic.   Eyes:      Extraocular Movements: Extraocular movements intact.      Conjunctiva/sclera: Conjunctivae normal.   Cardiovascular:      Rate and Rhythm: Regular rhythm. Tachycardia present.      Comments: Smooth VAD hum  Pulmonary:      Breath sounds: Normal breath sounds.   Abdominal:      Palpations: Abdomen is soft.   Musculoskeletal:         General: No swelling.      Cervical back: Neck supple.      Right lower leg: No edema.      Left lower leg: No edema.   Skin:     General: Skin is dry.      Capillary Refill: Capillary refill takes 2 to 3 seconds.   Neurological:      General: No focal deficit present.       Mental Status: He is alert and oriented to person, place, and time.      Motor: No weakness.   Psychiatric:         Mood and Affect: Mood normal.         Behavior: Behavior normal.         Thought Content: Thought content normal.         Judgment: Judgment normal.       Significant Labs:  CBC:  Recent Labs   Lab 07/20/22  1215 07/22/22  0424 07/23/22  0424 07/25/22  0854   WBC 12.44 10.69  --  9.48   RBC 3.10* 3.28*  --  3.25*   HGB 7.8* 8.2*  --  8.0*   HCT 25.9* 26.3* 29* 26.7*   * 514*  --  511*   MCV 84 80*  --  82   MCH 25.2* 25.0*  --  24.6*   MCHC 30.1* 31.2*  --  30.0*       BNP:  Recent Labs   Lab 07/20/22  0358 07/22/22  0424 07/25/22  0525   * 318* 337*       CMP:  Recent Labs   Lab 07/23/22  0427 07/23/22  1731 07/24/22  0508 07/24/22  1657 07/25/22  0525   *  --  133*  --  128*   CALCIUM 10.0  --  9.9  --  9.6   ALBUMIN 3.3*  --  3.1*  --  3.1*   PROT 8.4  --  7.9  --  8.0     --  133*  --  136   K 3.7  3.7   < > 4.1  4.1 4.5 4.1  4.1   CO2 26  --  28  --  26   CL 99  --  100  --  102   BUN 14  --  15  --  13   CREATININE 1.7*  --  1.6*  --  1.4   ALKPHOS 143*  --  136*  --  132   ALT 17  --  13  --  15   AST 29  --  26  --  27   BILITOT 1.0  --  0.8  --  0.7    < > = values in this interval not displayed.        Coagulation:   Recent Labs   Lab 07/20/22  0358 07/21/22  0430 07/22/22  0424 07/23/22  0427 07/24/22  0508 07/25/22  0525   INR 2.6* 1.8* 2.0* 3.0* 2.5* 2.4*   APTT 34.5* 29.3 27.3  --   --   --        LDH:  Recent Labs   Lab 07/23/22  0427 07/24/22  0508 07/25/22  0525   * 260 244       Microbiology:  Microbiology Results (last 7 days)       Procedure Component Value Units Date/Time    Blood culture [[id number]] Collected: 07/19/22 0006    Order Status: Completed Specimen: Blood from Peripheral, Antecubital, Left Updated: 07/24/22 0612     Blood Culture, Routine No growth after 5 days.            I have reviewed all pertinent labs within the past 24  hours.    Estimated Creatinine Clearance: 85.3 mL/min (based on SCr of 1.4 mg/dL).    Diagnostic Results:  I have reviewed and interpreted all pertinent imaging results/findings within the past 24 hours.

## 2022-07-25 NOTE — PLAN OF CARE
Problem: Physical Therapy  Goal: Physical Therapy Goal  Description: Goals to be met by: 2022    Patient will increase functional independence with mobility by performin. Supine to sit with MInimal Assistance -met   Updated: supervision  2. Sit to stand transfer with Minimal Assistance - met   2a. Sit to stand transfer with supervision- met   2b. Sit to stand with independence   3. Gait  x 50 feet with Contact Guard Assistance - met    3a. Gait 400 ft with Supervision - met       Outcome: Ongoing, Progressing     Treatment completed. Goal 3a met. Goals appropriate.

## 2022-07-25 NOTE — PT/OT/SLP PROGRESS
Physical Therapy Treatment    Patient Name:  Kevan Queen   MRN:  [id number]  Admit Date: 2022  Admitting Diagnosis:  LVAD (left ventricular assist device) present   Length of Stay: 42 days  Recent Surgery: Procedure(s) (LRB):  INSERTION, CATHETER, RIGHT HEART (Right) 4 Days Post-Op    Recommendations:     Discharge Recommendations:  home   Discharge Equipment Recommendations: none   Barriers to discharge: None    Plan:     During this hospitalization, patient to be seen 3 x/week to address the listed problems via gait training, therapeutic activities, therapeutic exercises, neuromuscular re-education  · Plan of Care Expires:  22   Plan of Care Reviewed with: patient, significant other    Assessment:     Kevan Queen is a 37 y.o. male admitted with a medical diagnosis of LVAD (left ventricular assist device) present. Pt missed by physical therapy for a few days due to new seizure activity. Pt medically appropriate to be treated today. Pt progressing towards goals, but not at PLOF. Pt tolerated session well. Pt is improving with therapy evidenced by increased gait distance and improved activity tolerance. Pt would benefit from continued PT services to improve overall functional mobility. Recommend d/c to Home to maximize functional independence.         Problem List: impaired endurance, decreased upper extremity function, impaired cardiopulmonary response to activity.  Rehab Prognosis: Good     GOALS:   Multidisciplinary Problems     Physical Therapy Goals        Problem: Physical Therapy    Goal Priority Disciplines Outcome Goal Variances Interventions   Physical Therapy Goal     PT, PT/OT Ongoing, Progressing     Description: Goals to be met by: 2022    Patient will increase functional independence with mobility by performin. Supine to sit with MInimal Assistance -met   Updated: supervision  2. Sit to stand transfer with Minimal Assistance - met   2a. Sit to stand transfer with  "supervision- met   2b. Sit to stand with independence   3. Gait  x 50 feet with Contact Guard Assistance - met    3a. Gait 400 ft with Supervision - met                  Multidisciplinary Problems (Resolved)        Problem: Physical Therapy    Goal Priority Disciplines Outcome Goal Variances Interventions   Physical Therapy Goal   (Resolved)     PT, PT/OT Met     Description:     Problem: Physical Therapy  Goal: Physical Therapy Goal  Description: Goals to be met by: 2022     Patient will increase functional independence with mobility by performin. Lower extremity exercise program without IABP 30 reps per handout, with independence  2.Upper extremity exercise program 30 reps per handout, with independence                         Subjective   Communicated with RN prior to session.  Patient found HOB elevated upon PT entry to room, agreeable to evaluation. Kevan Bernice's significant other present during session.    Chief Complaint: none reported   Patient/Family Comments/goals: to get better and return home   Pain/Comfort:  · Pain Rating 1: 0/10  · Pain Rating Post-Intervention 1: 0/10    Objective:   Patient found with: telemetry, LVAD, PICC line   General Precautions: Standard, Cardiac fall, LVAD, sternal   Orthopedic Precautions:N/A   Braces: N/A   Oxygen Device: Room Air  Vitals: BP 99/70 (BP Location: Left arm, Patient Position: Lying)   Pulse 90   Temp 98.7 °F (37.1 °C) (Oral)   Resp 18   Ht 6' 3" (1.905 m)   Wt 83.5 kg (184 lb 1.4 oz)   SpO2 99%   BMI 23.01 kg/m²     Outcome Measures:  AM-PAC 6 CLICK MOBILITY  Turning over in bed (including adjusting bedclothes, sheets and blankets)?: 4  Sitting down on and standing up from a chair with arms (e.g., wheelchair, bedside commode, etc.): 4  Moving from lying on back to sitting on the side of the bed?: 4  Moving to and from a bed to a chair (including a wheelchair)?: 4  Need to walk in hospital room?: 3  Climbing 3-5 steps with a " railing?: 3  Basic Mobility Total Score: 22   Functional Mobility:  Additional staff present: OT and SOT- prior new onset of seizures   Bed Mobility:    Supine to Sit: independence; HOB elevated   Scooting anteriorly to EOB to have both feet planted on floor: independence      Sitting Balance at Edge of Bed:   Assistance Level Required: [place]      Transfers:    Sit <> Stand Transfer: independence with no assistive device from EOB        Gait:  · Patient ambulated: 400ft   · Patient required: supervision  · Patient used: no assistive device  · Gait Pattern observed: reciprocal gait  · Gait Deviation(s): occasional unsteady gait  · Impairments due to: impaired balance and decreased endurance  · Comments:   · Mask donned; emergency bag present  · No overt LOB but pt occasionally unsteady  · Slight SOB present   · Verbal cuing for purposeful steps         Therapeutic Activities, Exercises, and Education:   Educated pt on PT role/POC  Educated pt on importance of OOB activity and daily ambulation  Educated pt on sternal precautions    Pt verbalized understanding     Pt switched from wall to battery power with OT assistance. No alarms sounded      Patient left with OT and SOT with all lines intact, call button in reach and RN notified and significant other present.    Time Tracking:     PT Received On: 07/25/22  PT Start Time: 0904     PT Stop Time: 0918  PT Total Time (min): 14 min     Billable Minutes:   · Gait Training 14    Treatment Type: Treatment  PT/PTA: PT

## 2022-07-25 NOTE — PROGRESS NOTES
07/25/2022  John Alaniz    Current provider:  Josh Pulido MD    Device interrogation:  TXP LVAD INTERROGATIONS 7/25/2022 7/25/2022 7/25/2022 7/25/2022 7/24/2022 7/24/2022 7/24/2022   Type HeartMate3 HeartMate3 HeartMate3 HeartMate3 HeartMate3 HeartMate3 HeartMate3   Flow 3.5 3.5 3.7 3.8 4.1 4.3 4.1   Speed 5100 5100 5100 5200 5100 5100 5100   PI 6.9 6.5 6.2 4.6 3.8 3.4 3.5   Power (Serna) 3.8 3.8 3.7 3.7 3.6 3.7 3.6   LSL 4700 4700 - - - - -   Pulsatility Pulse Pulse Intermittent pulse Intermittent pulse Intermittent pulse - -          Rounded on Kevan Bernice to ensure all mechanical assist device settings (IABP or VAD) were appropriate and all parameters were within limits.  I was able to ensure all back up equipment was present, the staff had no issues, and the Perfusion Department daily rounding was complete.      For implantable VADs: Interrogation of Ventricular assist device was performed with analysis of device parameters and review of device function. I have personally reviewed the interrogation findings and agree with findings as stated.     In emergency, the nursing units have been notified to contact the perfusion department either by:  Calling [phone] from 630am to 4pm Mon thru Fri, utilizing the On-Call Finder functionality of Epic and searching for Perfusion, or by contacting the hospital  from 4pm to 630am and on weekends and asking to speak with the perfusionist on call.    2:18 PM

## 2022-07-25 NOTE — RESEARCH
Study Title:  Antiplatelet Removal and HemocompatIbility EventS with the HeartMate 3 Pump   Protocol: CRD_971  IRB #/Approval Date: [id number]  Sponsor: [employer]  : Dr. Luis F Paige   Others present: wife,   Mr. Queen was met in room 316 for the Month 1 follow up visit. Protocoll required data is collected and entered into the EDC, as appropriate, including Pump Parameters, Vital Signs, Labs, .The subject verbalizes desired continuation in the trial. There are no questions from the subject.

## 2022-07-25 NOTE — ASSESSMENT & PLAN NOTE
Titrate insulin slowly to avoid hypoglycemia as the risk of hypoglycemia increases with decreased creatinine clearance.    Estimated Creatinine Clearance: 85.3 mL/min (based on SCr of 1.4 mg/dL).

## 2022-07-26 LAB
ALBUMIN SERPL BCP-MCNC: 3.2 G/DL (ref 3.5–5.2)
ALLENS TEST: ABNORMAL
ALP SERPL-CCNC: 130 U/L (ref 55–135)
ALT SERPL W/O P-5'-P-CCNC: 13 U/L (ref 10–44)
ANION GAP SERPL CALC-SCNC: 9 MMOL/L (ref 8–16)
AST SERPL-CCNC: 23 U/L (ref 10–40)
BASOPHILS # BLD AUTO: 0.07 K/UL (ref 0–0.2)
BASOPHILS NFR BLD: 0.7 % (ref 0–1.9)
BILIRUB SERPL-MCNC: 0.7 MG/DL (ref 0.1–1)
BUN SERPL-MCNC: 11 MG/DL (ref 6–20)
CALCIUM SERPL-MCNC: 9.7 MG/DL (ref 8.7–10.5)
CHLORIDE SERPL-SCNC: 100 MMOL/L (ref 95–110)
CO2 SERPL-SCNC: 25 MMOL/L (ref 23–29)
CREAT SERPL-MCNC: 1.5 MG/DL (ref 0.5–1.4)
DIFFERENTIAL METHOD: ABNORMAL
EOSINOPHIL # BLD AUTO: 0.3 K/UL (ref 0–0.5)
EOSINOPHIL NFR BLD: 2.6 % (ref 0–8)
ERYTHROCYTE [DISTWIDTH] IN BLOOD BY AUTOMATED COUNT: 18.2 % (ref 11.5–14.5)
EST. GFR  (AFRICAN AMERICAN): >60 ML/MIN/1.73 M^2
EST. GFR  (NON AFRICAN AMERICAN): 58.6 ML/MIN/1.73 M^2
GLUCOSE SERPL-MCNC: 117 MG/DL (ref 70–110)
HCO3 UR-SCNC: 28.9 MMOL/L (ref 24–28)
HCT VFR BLD AUTO: 26.7 % (ref 40–54)
HGB BLD-MCNC: 8.2 G/DL (ref 14–18)
IMM GRANULOCYTES # BLD AUTO: 0.03 K/UL (ref 0–0.04)
IMM GRANULOCYTES NFR BLD AUTO: 0.3 % (ref 0–0.5)
INR PPP: 2.7 (ref 0.8–1.2)
LDH SERPL L TO P-CCNC: 251 U/L (ref 110–260)
LYMPHOCYTES # BLD AUTO: 2.1 K/UL (ref 1–4.8)
LYMPHOCYTES NFR BLD: 20.9 % (ref 18–48)
MAGNESIUM SERPL-MCNC: 1.6 MG/DL (ref 1.6–2.6)
MCH RBC QN AUTO: 24.6 PG (ref 27–31)
MCHC RBC AUTO-ENTMCNC: 30.7 G/DL (ref 32–36)
MCV RBC AUTO: 80 FL (ref 82–98)
MONOCYTES # BLD AUTO: 0.8 K/UL (ref 0.3–1)
MONOCYTES NFR BLD: 8.4 % (ref 4–15)
NEUTROPHILS # BLD AUTO: 6.6 K/UL (ref 1.8–7.7)
NEUTROPHILS NFR BLD: 67.1 % (ref 38–73)
NRBC BLD-RTO: 0 /100 WBC
PCO2 BLDA: 49.8 MMHG (ref 35–45)
PH SMN: 7.37 [PH] (ref 7.35–7.45)
PLATELET # BLD AUTO: 547 K/UL (ref 150–450)
PMV BLD AUTO: 8.9 FL (ref 9.2–12.9)
PO2 BLDA: 30 MMHG (ref 40–60)
POC BE: 4 MMOL/L
POC SATURATED O2: 54 % (ref 95–100)
POC TCO2: 30 MMOL/L (ref 24–29)
POCT GLUCOSE: 124 MG/DL (ref 70–110)
POCT GLUCOSE: 127 MG/DL (ref 70–110)
POCT GLUCOSE: 190 MG/DL (ref 70–110)
POTASSIUM SERPL-SCNC: 4.1 MMOL/L (ref 3.5–5.1)
POTASSIUM SERPL-SCNC: 4.1 MMOL/L (ref 3.5–5.1)
PROT SERPL-MCNC: 8 G/DL (ref 6–8.4)
PROTHROMBIN TIME: 26.4 SEC (ref 9–12.5)
RBC # BLD AUTO: 3.34 M/UL (ref 4.6–6.2)
SAMPLE: ABNORMAL
SITE: ABNORMAL
SODIUM SERPL-SCNC: 134 MMOL/L (ref 136–145)
WBC # BLD AUTO: 9.89 K/UL (ref 3.9–12.7)

## 2022-07-26 PROCEDURE — 83615 LACTATE (LD) (LDH) ENZYME: CPT | Performed by: THORACIC SURGERY (CARDIOTHORACIC VASCULAR SURGERY)

## 2022-07-26 PROCEDURE — 25000003 PHARM REV CODE 250: Performed by: STUDENT IN AN ORGANIZED HEALTH CARE EDUCATION/TRAINING PROGRAM

## 2022-07-26 PROCEDURE — 25000003 PHARM REV CODE 250: Performed by: INTERNAL MEDICINE

## 2022-07-26 PROCEDURE — 25000003 PHARM REV CODE 250: Performed by: ANESTHESIOLOGY

## 2022-07-26 PROCEDURE — 80053 COMPREHEN METABOLIC PANEL: CPT

## 2022-07-26 PROCEDURE — 27000248 HC VAD-ADDITIONAL DAY

## 2022-07-26 PROCEDURE — 25000003 PHARM REV CODE 250

## 2022-07-26 PROCEDURE — 25000003 PHARM REV CODE 250: Performed by: PHYSICIAN ASSISTANT

## 2022-07-26 PROCEDURE — 99900035 HC TECH TIME PER 15 MIN (STAT)

## 2022-07-26 PROCEDURE — 83735 ASSAY OF MAGNESIUM: CPT | Performed by: INTERNAL MEDICINE

## 2022-07-26 PROCEDURE — 93750 INTERROGATION VAD IN PERSON: CPT | Mod: ,,, | Performed by: INTERNAL MEDICINE

## 2022-07-26 PROCEDURE — 63600175 PHARM REV CODE 636 W HCPCS: Performed by: PHYSICIAN ASSISTANT

## 2022-07-26 PROCEDURE — A4216 STERILE WATER/SALINE, 10 ML: HCPCS | Performed by: THORACIC SURGERY (CARDIOTHORACIC VASCULAR SURGERY)

## 2022-07-26 PROCEDURE — 85347 COAGULATION TIME ACTIVATED: CPT

## 2022-07-26 PROCEDURE — 93750 PR INTERROGATE VENT ASSIST DEV, IN PERSON, W PHYSICIAN ANALYSIS: ICD-10-PCS | Mod: ,,, | Performed by: INTERNAL MEDICINE

## 2022-07-26 PROCEDURE — 85025 COMPLETE CBC W/AUTO DIFF WBC: CPT | Performed by: PHYSICIAN ASSISTANT

## 2022-07-26 PROCEDURE — 20600001 HC STEP DOWN PRIVATE ROOM

## 2022-07-26 PROCEDURE — 85610 PROTHROMBIN TIME: CPT | Performed by: PHYSICIAN ASSISTANT

## 2022-07-26 PROCEDURE — 99233 PR SUBSEQUENT HOSPITAL CARE,LEVL III: ICD-10-PCS | Mod: ,,, | Performed by: PHYSICIAN ASSISTANT

## 2022-07-26 PROCEDURE — 25000003 PHARM REV CODE 250: Performed by: THORACIC SURGERY (CARDIOTHORACIC VASCULAR SURGERY)

## 2022-07-26 PROCEDURE — 99233 SBSQ HOSP IP/OBS HIGH 50: CPT | Mod: ,,, | Performed by: PHYSICIAN ASSISTANT

## 2022-07-26 RX ORDER — LANOLIN ALCOHOL/MO/W.PET/CERES
800 CREAM (GRAM) TOPICAL 2 TIMES DAILY
Status: DISCONTINUED | OUTPATIENT
Start: 2022-07-26 | End: 2022-07-28 | Stop reason: HOSPADM

## 2022-07-26 RX ADMIN — GABAPENTIN 100 MG: 100 CAPSULE ORAL at 02:07

## 2022-07-26 RX ADMIN — Medication 10 ML: at 12:07

## 2022-07-26 RX ADMIN — INSULIN ASPART 10 UNITS: 100 INJECTION, SOLUTION INTRAVENOUS; SUBCUTANEOUS at 04:07

## 2022-07-26 RX ADMIN — ACETAMINOPHEN 650 MG: 325 TABLET ORAL at 02:07

## 2022-07-26 RX ADMIN — SPIRONOLACTONE 25 MG: 25 TABLET, FILM COATED ORAL at 08:07

## 2022-07-26 RX ADMIN — POTASSIUM CHLORIDE 40 MEQ: 20 TABLET, EXTENDED RELEASE ORAL at 08:07

## 2022-07-26 RX ADMIN — Medication 324 MG: at 08:07

## 2022-07-26 RX ADMIN — FUROSEMIDE 80 MG: 80 TABLET ORAL at 08:07

## 2022-07-26 RX ADMIN — TIZANIDINE 2 MG: 2 TABLET ORAL at 09:07

## 2022-07-26 RX ADMIN — WARFARIN SODIUM 3 MG: 3 TABLET ORAL at 04:07

## 2022-07-26 RX ADMIN — BUSPIRONE HYDROCHLORIDE 7.5 MG: 5 TABLET ORAL at 05:07

## 2022-07-26 RX ADMIN — ACETAMINOPHEN 650 MG: 325 TABLET ORAL at 05:07

## 2022-07-26 RX ADMIN — POTASSIUM CHLORIDE 40 MEQ: 20 TABLET, EXTENDED RELEASE ORAL at 02:07

## 2022-07-26 RX ADMIN — FAMOTIDINE 40 MG: 20 TABLET ORAL at 08:07

## 2022-07-26 RX ADMIN — Medication 10 ML: at 06:07

## 2022-07-26 RX ADMIN — INSULIN ASPART 10 UNITS: 100 INJECTION, SOLUTION INTRAVENOUS; SUBCUTANEOUS at 12:07

## 2022-07-26 RX ADMIN — GABAPENTIN 100 MG: 100 CAPSULE ORAL at 09:07

## 2022-07-26 RX ADMIN — TIZANIDINE 2 MG: 2 TABLET ORAL at 02:07

## 2022-07-26 RX ADMIN — TIZANIDINE 2 MG: 2 TABLET ORAL at 05:07

## 2022-07-26 RX ADMIN — MIRTAZAPINE 15 MG: 15 TABLET, FILM COATED ORAL at 09:07

## 2022-07-26 RX ADMIN — POTASSIUM CHLORIDE 40 MEQ: 20 TABLET, EXTENDED RELEASE ORAL at 09:07

## 2022-07-26 RX ADMIN — MILRINONE LACTATE IN DEXTROSE 0.25 MCG/KG/MIN: 200 INJECTION, SOLUTION INTRAVENOUS at 02:07

## 2022-07-26 RX ADMIN — GABAPENTIN 100 MG: 100 CAPSULE ORAL at 08:07

## 2022-07-26 RX ADMIN — Medication 800 MG: at 09:07

## 2022-07-26 RX ADMIN — LEVETIRACETAM 1000 MG: 500 TABLET, FILM COATED ORAL at 09:07

## 2022-07-26 RX ADMIN — MILRINONE LACTATE IN DEXTROSE 0.25 MCG/KG/MIN: 200 INJECTION, SOLUTION INTRAVENOUS at 04:07

## 2022-07-26 RX ADMIN — FUROSEMIDE 80 MG: 80 TABLET ORAL at 06:07

## 2022-07-26 RX ADMIN — ACETAMINOPHEN 650 MG: 325 TABLET ORAL at 09:07

## 2022-07-26 RX ADMIN — Medication 10 ML: at 05:07

## 2022-07-26 RX ADMIN — LEVETIRACETAM 1000 MG: 500 TABLET, FILM COATED ORAL at 08:07

## 2022-07-26 RX ADMIN — INSULIN DETEMIR 22 UNITS: 100 INJECTION, SOLUTION SUBCUTANEOUS at 09:07

## 2022-07-26 RX ADMIN — INSULIN ASPART 1 UNITS: 100 INJECTION, SOLUTION INTRAVENOUS; SUBCUTANEOUS at 09:07

## 2022-07-26 RX ADMIN — Medication 800 MG: at 08:07

## 2022-07-26 NOTE — PLAN OF CARE
Problem: Diabetes Comorbidity  Goal: Blood Glucose Level Within Targeted Range  Outcome: Ongoing, Progressing     Problem: Fall Injury Risk  Goal: Absence of Fall and Fall-Related Injury  Outcome: Ongoing, Progressing     Problem: Skin Injury Risk Increased  Goal: Skin Health and Integrity  Outcome: Ongoing, Progressing     Problem: Coping Ineffective  Goal: Effective Coping  Outcome: Ongoing, Progressing     Problem: Adjustment to Illness (Heart Failure)  Goal: Optimal Coping  Outcome: Ongoing, Progressing     Problem: Cardiac Output Decreased (Heart Failure)  Goal: Optimal Cardiac Output  Outcome: Ongoing, Progressing     Problem: Dysrhythmia (Heart Failure)  Goal: Stable Heart Rate and Rhythm  Outcome: Ongoing, Progressing     Problem: Fluid Imbalance (Heart Failure)  Goal: Fluid Balance  Outcome: Ongoing, Progressing     Problem: Adjustment to Device (Ventricular Assist Device)  Goal: Optimal Adjustment to Device  Outcome: Ongoing, Progressing     Problem: Bleeding (Ventricular Assist Device)  Goal: Absence of Bleeding  Outcome: Ongoing, Progressing    Plan of care discussed with patient.  Patient ambulating independently, fall precautions in place. LVAD DP and numbers WNL, smooth LVAD hum.  No active alarms. Milrinone at 0.25 mcg/kg/min at 7 mL/h. Patient has no complaints of pain. Discussed medications and care.  Patient has no questions at this time. Will continue to monitor.

## 2022-07-26 NOTE — ASSESSMENT & PLAN NOTE
-on empiric Vanc and Cefepime for total of 5 days (end date 7/23)  -ID consulted appreciate their recommendations  -WBC improved

## 2022-07-26 NOTE — ASSESSMENT & PLAN NOTE
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  - HTS primary   - GASTON trial   - INR was supratherapeuitc and coumadin was held. Goal 2.0-3.0 INR is 3.0 today.  Pharmacy to dose   - LDH stable  -Changed  to Primacor yesterday and patient now on po Lasix.  (does not tolerate po Bumex or Torsemide due to hives)   - CVP 11 SVO2: 54%. Will continue current regimen   -RHC with speed 5100 RA: 14, RV: 41/8 (14), PA: 42/17 (25), PWP: 16/17 (15), CO: 5.32, CI: 2.51  - ECHO 7/24 with speed at 5100- EF: 10%, LVEDD: 6.3mc   -Awaiting caregiver to be checked off on dressing for discharge     Procedure: Device Interrogation Including analysis of device parameters  Current Settings: Ventricular Assist Device  Review of device function is stable    TXP LVAD INTERROGATIONS 7/26/2022 7/26/2022 7/25/2022 7/25/2022 7/25/2022 7/25/2022 7/25/2022   Type HeartMate3 HeartMate3 HeartMate3 HeartMate3 HeartMate3 HeartMate3 HeartMate3   Flow 4.2 4.1 4.1 4.2 3.5 3.5 3.7   Speed 5100 5100 5100 5100 5100 5100 5100   PI 4.5 3.7 3.5 3.9 6.9 6.5 6.2   Power (Serna) 3.6 3.6 3.7 3.7 3.8 3.8 3.7   LSL 4700 4700 4700 4700 4700 4700 -   Pulsatility Pulse Pulse Pulse Pulse Pulse Pulse Intermittent pulse

## 2022-07-26 NOTE — NURSING
Pt's wife IS NOT CHECKED OFF ON LVAD DRESSING CHANGE. She was able to follow the steps of dressing change , but she did not maintaining sterile field while opening the sterile gloves.

## 2022-07-26 NOTE — PLAN OF CARE
Pt free of falls and injury. Pt AAOx4. Fall precautions remain in place. Sternal precautions maintained. Ambulation encouraged. Pt on room air. Sats >90%. NSR on telemetry with LVAD artifact. LVAD hum present and smooth. VAD numbers and dopplers WDL. Plan of care reviewed with pt. Milrinone gtt infusing at 0.25 mcg/min and 7 ml/hr. Pt resting comfortably with no complaints of pain. Pt denies chest pain, headache, and SOB. No acute distress noted, will continue to monitor.

## 2022-07-26 NOTE — PLAN OF CARE
[facility]   Heart Transplant/VAD Clinic   [address] [place] [address]   [phone] after hours          [phone] fax     VAD HOME  HEALTH ORDERS      Admit to Home Health    Diagnosis:   Patient Active Problem List   Diagnosis    Acute on chronic combined systolic and diastolic heart failure, NYHA class 4    Anxiety disorder, unspecified    NICM (nonischemic cardiomyopathy)    Anemia of chronic disease    Transaminitis    Ecstasy abuse    Cannabis use disorder, severe, dependence    Congestive heart failure    Chronic combined systolic and diastolic congestive heart failure    Tobacco use    CKD (chronic kidney disease) stage 3, GFR 30-59 ml/min    Uncontrolled diabetes mellitus    Insomnia    Left shoulder pain    SOB (shortness of breath)    Tachycardia    Palliative care encounter    Goals of care, counseling/discussion    Advanced care planning/counseling discussion    Staphylococcus epidermidis bacteremia    Dilated cardiomyopathy    Surgical wound present    LVAD (left ventricular assist device) present    Encounter for weaning from ventilator    Hyponatremia    Cardiogenic shock    Tingling in extremities    Seizure-like activity    Leukocytosis    Temporal lobe seizure    Examination of participant in clinical trial       Patient is homebound due to:  NYHA Class IV HF. S/P LVAD placement.     Diet: Low Fat, Low cholesterol, 2Gm Na, Coumadin restrictions.    Acitivities: No Swimming, bathing, vacuuming, contact sports.    Fresh implants= Sternal Precautions    Nursing:   SN to complete comprehensive assessment including routine vital signs. Instruct on disease process and s/s of complications to report to MD. Review/verify medication list sent home with the patient at time of discharge  and instruct patient/caregiver as needed. Frequency may be adjusted depending on start of care date.    **LVAD driveline exit site  dressing change is to be completed per LVAD patient/caregiver only**.    Notify MD if:  SBP > 120 or < 80;   MAP > 80 or < 65;   HR > 120 or < 60;   Temp > 101;   Weight gain >3lbs in 1 day or 5lbs in 1 week.    LABS:  SN to perform labs: PT/INR per Coumadin clinic [phone].   Follow up INR date: 8/1/22  No Finger Sticks    HOME INFUSION THERAPY:    SN to perform Infusion Therapy/Central Line Care.  Review Central Line Care & Central Line Flush with patient.    Administer (drug and dose): Milrinone 0.25mcg/kg/min x 93.7 kg, intravenous, continuous   Central line care:  Scrub the Hub: Prior to accessing the line, always perform a 30 second alcohol scrub  Each lumen of the central line is to be flushed at least daily with 10 mL Normal Saline and 3 mL Heparin flush (100 units/mL)  Skilled Nurse (SN) may draw blood from IV access  Blood Draw Procedure:   - Aspirate at least 5 mL of blood   - Discard   - Obtain specimen   - Change posiflow cap   - Flush with 20 mL Normal Saline followed by a                 3-5 mL Heparin flush (100 units/mL)  Central :   - Sterile dressing changes are done weekly and as needed.   - Use chlor-hexadine scrub to cleanse site, apply Biopatch to insertion site,       apply securement device dressing   - Posi-flow caps are changed weekly and after EVERY lab draw.   - If sterile gauze is under dressing to control oozing,                 dressing change must be performed every 24 hours until gauze is not needed.    CONSULTS:   Physical Therapy to evaluate and treat. Evaluate for home safety and equipment needs; Establish/upgrade home exercise program. Perform / instruct on therapeutic exercises, gait training, transfer training, and Range of Motion.    Occupational Therapy to evaluate and treat. Evaluate home environment for safety and equipment needs. Perform/Instruct on transfers, ADL training, ROM, and therapeutic exercises.    Aide to provide assistance with personal  care, ADLs, and vital signs    Send initial Home Health orders to HTS attending physician on call.  Send follow up questions to VAD clinic MD [phone] or [phone].

## 2022-07-26 NOTE — CARE UPDATE
BG goal 140-180    -A1C   Lab Results   Component Value Date    HGBA1C 9.2 (H) 05/20/2022         -HOME REGIMEN: Detemir  23  units daily and Aspart   12  units TIDWM and  Mod dose correction insulin    -INPATIENT REGIMEN: levemir 22  units daily and novolog 10 units with meals/ novolog 4 units prn snack dose     -GLUCOSE TREND FOR THE PAST 24HRS: 103-180    -NO HYPOGYCEMIAS NOTED     -TOLERATING 75% OF PO DIET     -Diet: Diet Cardiac [facility]; Consistent Carbohydrate; Fluid - 1500mL        Remains in CSU, NAEON. Creatinine 1.5. BG reasonably well controlled with scheduled insulin and prn correction scale.  LVAD. Milrinone infusion. 5 Days Post-Op      Plan:  continue levemir 22 units daily.   continue novolog 10 units with meals.   Continue novolog 4 units prn snack dose.   BG monitoring ac/hs and moderate dose correction scale.     Discharge planning:tbd     Endocrine to continue to follow    ** Please call Endocrine for any BG related issues **

## 2022-07-26 NOTE — ASSESSMENT & PLAN NOTE
-No post ictal period  -Appreciate Neurology consult.  EEG ordered for 1 hour.  Study was done; findings consistent with L temporal lobe seizure.   -orthostatic BP negative  -device interrogation negative   - Neurology starting Keppra 1000 mg BID for partial temporal lobe seizure on EEG.

## 2022-07-26 NOTE — PROGRESS NOTES
Interdisciplinary Rounds Report:   Attended interdisciplinary rounds with the [facility]/CTS services including the LVAD Coordinators, social workers, cardiologists, surgeons,  PT/OT/Speech, dietician, and unit charge nurses. Discussed patient status, plan of care, goals of care, including DC date, and post discharge needs. Plan of care will be discussed with the patient and/or family per the physician while rounding on the floor. This is a recurring meeting that is medically and socially necessary to collaborate with the interdisciplinary team to assist patient needs and safe discharge.

## 2022-07-26 NOTE — SUBJECTIVE & OBJECTIVE
Interval History: Patient reports no new complaints today.  We transitioned him from  to Primacor yesterday due to  shortage and inability to discharge him on .  He responded well with no adverse effects.  He is net negative 1.8L in the last 24 hours.  CVP: 11, SVO2: 54, CO: 5.84, CI: 2.78, SVR: 671.  Awaiting wife to get checked off on dressing changes for discharge.  Will place home health orders. Medically stable to go home on Primacor.      Continuous Infusions:   sodium chloride 0.9% 5 mL/hr at 06/27/22 0055    sodium chloride 0.9% Stopped (07/18/22 2000)    milrinone 20mg/100ml D5W (200mcg/ml) 0.25 mcg/kg/min (07/26/22 0258)     Scheduled Meds:   acetaminophen  650 mg Oral Q8H    busPIRone  7.5 mg Oral Daily    famotidine  40 mg Oral Daily    ferrous gluconate  324 mg Oral Daily with breakfast    furosemide  80 mg Oral BID    gabapentin  100 mg Oral TID    insulin aspart U-100  10 Units Subcutaneous TIDWM    insulin detemir U-100  22 Units Subcutaneous QHS    INV aspirin/placebo  100 mg Oral Daily    levETIRAcetam  1,000 mg Oral BID    magnesium oxide  800 mg Oral BID    mirtazapine  15 mg Oral Nightly    polyethylene glycol  17 g Oral Daily    potassium chloride  40 mEq Oral TID    sodium chloride 0.9%  10 mL Intravenous Q6H    spironolactone  25 mg Oral Daily    tiZANidine  2 mg Oral Q8H    warfarin  3 mg Oral Daily     PRN Meds:albuterol sulfate, bisacodyL, dextrose 10%, dextrose 10%, docusate sodium, glucagon (human recombinant), glucose, glucose, insulin aspart U-100, insulin aspart U-100, LIDOcaine HCL 20 mg/ml (2%), magnesium sulfate IVPB, ondansetron, oxyCODONE, polyethylene glycol, promethazine (PHENERGAN) IVPB, sodium chloride 0.9%, sodium chloride 0.9%, Flushing PICC Protocol **AND** sodium chloride 0.9% **AND** sodium chloride 0.9%, sodium chloride 0.9%, traMADoL    Review of patient's allergies indicates:   Allergen Reactions    Aspirin Other (See Comments)     Mr. Thacker is enrolled in  Dr. Paige's Alon Trial and cannot have any aspirin and/or aspirin-containing products. DO NOT cancel any orders for INV Aspirin 100 mg/Placebo. If you have questions, please contact Isabel @ [phone], [phone],[email or web address], secure chat or MS Teams message.    Bumex [bumetanide] Hives    Lactose Diarrhea     Other reaction(s): Abdominal distension, gaseous    Torsemide Hives     Objective:     Vital Signs (Most Recent):  Temp: 98.1 °F (36.7 °C) (07/26/22 0738)  Pulse: 101 (07/26/22 0738)  Resp: 19 (07/26/22 0738)  BP: (!) 80/52 (07/26/22 0444)  SpO2: 97 % (07/26/22 0738)   Vital Signs (24h Range):  Temp:  [97.6 °F (36.4 °C)-99.5 °F (37.5 °C)] 98.1 °F (36.7 °C)  Pulse:  [] 101  Resp:  [17-20] 19  SpO2:  [97 %-100 %] 97 %  BP: ()/(0-70) 80/52     Patient Vitals for the past 72 hrs (Last 3 readings):   Weight   07/26/22 0800 83.5 kg (184 lb 1.4 oz)   07/25/22 0800 83.5 kg (184 lb 1.4 oz)   07/24/22 0800 85.7 kg (188 lb 15 oz)       Body mass index is 23.01 kg/m².      Intake/Output Summary (Last 24 hours) at 7/26/2022 1107  Last data filed at 7/25/2022 2000  Gross per 24 hour   Intake 684 ml   Output 1500 ml   Net -816 ml       Hemodynamic Parameters: CVP 11  CVP:  [11 mmHg] 11 mmHg    Telemetry: ST    Physical Exam  Vitals and nursing note reviewed.   Constitutional:       Appearance: Normal appearance.   HENT:      Head: Normocephalic and atraumatic.   Eyes:      Extraocular Movements: Extraocular movements intact.      Conjunctiva/sclera: Conjunctivae normal.   Cardiovascular:      Rate and Rhythm: Regular rhythm. Tachycardia present.      Comments: Smooth VAD hum  Pulmonary:      Breath sounds: Normal breath sounds.   Abdominal:      Palpations: Abdomen is soft.   Musculoskeletal:         General: No swelling.      Cervical back: Neck supple.      Right lower leg: No edema.      Left lower leg: No edema.   Skin:     General: Skin is dry.      Capillary Refill: Capillary refill takes 2 to 3  seconds.   Neurological:      General: No focal deficit present.      Mental Status: He is alert and oriented to person, place, and time.      Motor: No weakness.   Psychiatric:         Mood and Affect: Mood normal.         Behavior: Behavior normal.         Thought Content: Thought content normal.         Judgment: Judgment normal.       Significant Labs:  CBC:  Recent Labs   Lab 07/22/22  0424 07/23/22  0424 07/25/22  0854 07/26/22  0504   WBC 10.69  --  9.48 9.89   RBC 3.28*  --  3.25* 3.34*   HGB 8.2*  --  8.0* 8.2*   HCT 26.3* 29* 26.7* 26.7*   *  --  511* 547*   MCV 80*  --  82 80*   MCH 25.0*  --  24.6* 24.6*   MCHC 31.2*  --  30.0* 30.7*       BNP:  Recent Labs   Lab 07/20/22  0358 07/22/22  0424 07/25/22  0525   * 318* 337*       CMP:  Recent Labs   Lab 07/24/22  0508 07/24/22  1657 07/25/22  0525 07/25/22  1734 07/26/22  0504   *  --  128*  --  117*   CALCIUM 9.9  --  9.6  --  9.7   ALBUMIN 3.1*  --  3.1*  --  3.2*   PROT 7.9  --  8.0  --  8.0   *  --  136  --  134*   K 4.1  4.1   < > 4.1  4.1 4.3 4.1  4.1   CO2 28  --  26  --  25     --  102  --  100   BUN 15  --  13  --  11   CREATININE 1.6*  --  1.4  --  1.5*   ALKPHOS 136*  --  132  --  130   ALT 13  --  15  --  13   AST 26  --  27  --  23   BILITOT 0.8  --  0.7  --  0.7    < > = values in this interval not displayed.        Coagulation:   Recent Labs   Lab 07/20/22  0358 07/21/22  0430 07/22/22  0424 07/23/22  0427 07/24/22  0508 07/25/22  0525 07/26/22  0504   INR 2.6* 1.8* 2.0*   < > 2.5* 2.4* 2.7*   APTT 34.5* 29.3 27.3  --   --   --   --     < > = values in this interval not displayed.       LDH:  Recent Labs   Lab 07/24/22  0508 07/25/22  0525 07/26/22  0504    244 251       Microbiology:  Microbiology Results (last 7 days)       Procedure Component Value Units Date/Time    Blood culture [[id number]] Collected: 07/19/22 0006    Order Status: Completed Specimen: Blood from Peripheral, Antecubital, Left  Updated: 07/24/22 0612     Blood Culture, Routine No growth after 5 days.            I have reviewed all pertinent labs within the past 24 hours.    Estimated Creatinine Clearance: 79.6 mL/min (A) (based on SCr of 1.5 mg/dL (H)).    Diagnostic Results:  I have reviewed and interpreted all pertinent imaging results/findings within the past 24 hours.

## 2022-07-26 NOTE — PROGRESS NOTES
[facility] - Cardiology Stepdown  Heart Transplant  Progress Note    Patient Name: Kevan Queen  MRN: [id number]  Admission Date: 6/13/2022  Hospital Length of Stay: 43 days  Attending Physician: Josh Pulido MD  Primary Care Provider: ORALIA Cline  Principal Problem:LVAD (left ventricular assist device) present    Subjective:     Interval History: Patient reports no new complaints today.  We transitioned him from  to Primacor yesterday due to  shortage and inability to discharge him on .  He responded well with no adverse effects.  He is net negative 1.8L in the last 24 hours.  CVP: 11, SVO2: 54, CO: 5.84, CI: 2.78, SVR: 671.  Awaiting wife to get checked off on dressing changes for discharge.  Will place home health orders. Medically stable to go home on Primacor.      Continuous Infusions:   sodium chloride 0.9% 5 mL/hr at 06/27/22 0055    sodium chloride 0.9% Stopped (07/18/22 2000)    milrinone 20mg/100ml D5W (200mcg/ml) 0.25 mcg/kg/min (07/26/22 0258)     Scheduled Meds:   acetaminophen  650 mg Oral Q8H    busPIRone  7.5 mg Oral Daily    famotidine  40 mg Oral Daily    ferrous gluconate  324 mg Oral Daily with breakfast    furosemide  80 mg Oral BID    gabapentin  100 mg Oral TID    insulin aspart U-100  10 Units Subcutaneous TIDWM    insulin detemir U-100  22 Units Subcutaneous QHS    INV aspirin/placebo  100 mg Oral Daily    levETIRAcetam  1,000 mg Oral BID    magnesium oxide  800 mg Oral BID    mirtazapine  15 mg Oral Nightly    polyethylene glycol  17 g Oral Daily    potassium chloride  40 mEq Oral TID    sodium chloride 0.9%  10 mL Intravenous Q6H    spironolactone  25 mg Oral Daily    tiZANidine  2 mg Oral Q8H    warfarin  3 mg Oral Daily     PRN Meds:albuterol sulfate, bisacodyL, dextrose 10%, dextrose 10%, docusate sodium, glucagon (human recombinant), glucose, glucose, insulin aspart U-100, insulin aspart U-100, LIDOcaine HCL 20 mg/ml (2%), magnesium sulfate  IVPB, ondansetron, oxyCODONE, polyethylene glycol, promethazine (PHENERGAN) IVPB, sodium chloride 0.9%, sodium chloride 0.9%, Flushing PICC Protocol **AND** sodium chloride 0.9% **AND** sodium chloride 0.9%, sodium chloride 0.9%, traMADoL    Review of patient's allergies indicates:   Allergen Reactions    Aspirin Other (See Comments)     Mr. Thacker is enrolled in Dr. Paige's Alon Trial and cannot have any aspirin and/or aspirin-containing products. DO NOT cancel any orders for INV Aspirin 100 mg/Placebo. If you have questions, please contact Isabel @ [phone], [phone],[email or web address], secure chat or MS Teams message.    Bumex [bumetanide] Hives    Lactose Diarrhea     Other reaction(s): Abdominal distension, gaseous    Torsemide Hives     Objective:     Vital Signs (Most Recent):  Temp: 98.1 °F (36.7 °C) (07/26/22 0738)  Pulse: 101 (07/26/22 0738)  Resp: 19 (07/26/22 0738)  BP: (!) 80/52 (07/26/22 0444)  SpO2: 97 % (07/26/22 0738)   Vital Signs (24h Range):  Temp:  [97.6 °F (36.4 °C)-99.5 °F (37.5 °C)] 98.1 °F (36.7 °C)  Pulse:  [] 101  Resp:  [17-20] 19  SpO2:  [97 %-100 %] 97 %  BP: ()/(0-70) 80/52     Patient Vitals for the past 72 hrs (Last 3 readings):   Weight   07/26/22 0800 83.5 kg (184 lb 1.4 oz)   07/25/22 0800 83.5 kg (184 lb 1.4 oz)   07/24/22 0800 85.7 kg (188 lb 15 oz)       Body mass index is 23.01 kg/m².      Intake/Output Summary (Last 24 hours) at 7/26/2022 1107  Last data filed at 7/25/2022 2000  Gross per 24 hour   Intake 684 ml   Output 1500 ml   Net -816 ml       Hemodynamic Parameters: CVP 11  CVP:  [11 mmHg] 11 mmHg    Telemetry: ST    Physical Exam  Vitals and nursing note reviewed.   Constitutional:       Appearance: Normal appearance.   HENT:      Head: Normocephalic and atraumatic.   Eyes:      Extraocular Movements: Extraocular movements intact.      Conjunctiva/sclera: Conjunctivae normal.   Cardiovascular:      Rate and Rhythm: Regular rhythm. Tachycardia  present.      Comments: Smooth VAD hum  Pulmonary:      Breath sounds: Normal breath sounds.   Abdominal:      Palpations: Abdomen is soft.   Musculoskeletal:         General: No swelling.      Cervical back: Neck supple.      Right lower leg: No edema.      Left lower leg: No edema.   Skin:     General: Skin is dry.      Capillary Refill: Capillary refill takes 2 to 3 seconds.   Neurological:      General: No focal deficit present.      Mental Status: He is alert and oriented to person, place, and time.      Motor: No weakness.   Psychiatric:         Mood and Affect: Mood normal.         Behavior: Behavior normal.         Thought Content: Thought content normal.         Judgment: Judgment normal.       Significant Labs:  CBC:  Recent Labs   Lab 07/22/22  0424 07/23/22  0424 07/25/22  0854 07/26/22  0504   WBC 10.69  --  9.48 9.89   RBC 3.28*  --  3.25* 3.34*   HGB 8.2*  --  8.0* 8.2*   HCT 26.3* 29* 26.7* 26.7*   *  --  511* 547*   MCV 80*  --  82 80*   MCH 25.0*  --  24.6* 24.6*   MCHC 31.2*  --  30.0* 30.7*       BNP:  Recent Labs   Lab 07/20/22  0358 07/22/22  0424 07/25/22  0525   * 318* 337*       CMP:  Recent Labs   Lab 07/24/22  0508 07/24/22  1657 07/25/22  0525 07/25/22  1734 07/26/22  0504   *  --  128*  --  117*   CALCIUM 9.9  --  9.6  --  9.7   ALBUMIN 3.1*  --  3.1*  --  3.2*   PROT 7.9  --  8.0  --  8.0   *  --  136  --  134*   K 4.1  4.1   < > 4.1  4.1 4.3 4.1  4.1   CO2 28  --  26  --  25     --  102  --  100   BUN 15  --  13  --  11   CREATININE 1.6*  --  1.4  --  1.5*   ALKPHOS 136*  --  132  --  130   ALT 13  --  15  --  13   AST 26  --  27  --  23   BILITOT 0.8  --  0.7  --  0.7    < > = values in this interval not displayed.        Coagulation:   Recent Labs   Lab 07/20/22  0358 07/21/22  0430 07/22/22  0424 07/23/22  0427 07/24/22  0508 07/25/22  0525 07/26/22  0504   INR 2.6* 1.8* 2.0*   < > 2.5* 2.4* 2.7*   APTT 34.5* 29.3 27.3  --   --   --   --     < > =  "values in this interval not displayed.       LDH:  Recent Labs   Lab 07/24/22  0508 07/25/22  0525 07/26/22  0504    244 251       Microbiology:  Microbiology Results (last 7 days)       Procedure Component Value Units Date/Time    Blood culture [[id number]] Collected: 07/19/22 0006    Order Status: Completed Specimen: Blood from Peripheral, Antecubital, Left Updated: 07/24/22 0612     Blood Culture, Routine No growth after 5 days.            I have reviewed all pertinent labs within the past 24 hours.    Estimated Creatinine Clearance: 79.6 mL/min (A) (based on SCr of 1.5 mg/dL (H)).    Diagnostic Results:  I have reviewed and interpreted all pertinent imaging results/findings within the past 24 hours.    Assessment and Plan:     36 yo male with NIDCM with BiV systolic heart failure, on home Milrinone at 0.375 mcg/kg/min, presented at [facility] 4/2/22 ,was not evaluated for OHT as he has recently quit smoking in April 2022 but was approved for VAD with plan to begin OHT evaluation in upcoming months if Mr Queen is stable and suitable for OHT eval (blood group A), 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 Went to ED at [facility] 6/10 but left after waiting 4 hours. Had clinic appointment with us today, but arrived to [facility] early this morning and decided to go to the ED instead. Baseline Lasix dose is 80 mg bid. Reports taking 240 mg qd past 3 days with no improvement. BNP is 1701, up from 898 on 6/2 and 49 on 5/24. sCr is 1.8 with baseline ~ 1.5-1.7. sPO2 on RA is 93%. Wife at bedside    He has been given Lasix 80 mg IVP in the ED with plan to start Lasix gtt at 20 mg/hr           * " LVAD (left ventricular assist device) present  - S/P DT HM3 with MVR plus Protek Duo for RV support 6/29 (Grecia)  - RVAD removed with concern for hemolysis.  - HTS primary   - GASTON trial   - INR was supratherapeuitc and coumadin was held. Goal 2.0-3.0 INR is 3.0 today.  Pharmacy to dose   - LDH stable  -Changed  to Primacor yesterday and patient now on po Lasix.  (does not tolerate po Bumex or Torsemide due to hives)   - CVP 11 SVO2: 54%. Will continue current regimen   -RHC with speed 5100 RA: 14, RV: 41/8 (14), PA: 42/17 (25), PWP: 16/17 (15), CO: 5.32, CI: 2.51  - ECHO 7/24 with speed at 5100- EF: 10%, LVEDD: 6.3mc   -Awaiting caregiver to be checked off on dressing for discharge     Procedure: Device Interrogation Including analysis of device parameters  Current Settings: Ventricular Assist Device  Review of device function is stable    TXP LVAD INTERROGATIONS 7/26/2022 7/26/2022 7/25/2022 7/25/2022 7/25/2022 7/25/2022 7/25/2022   Type HeartMate3 HeartMate3 HeartMate3 HeartMate3 HeartMate3 HeartMate3 HeartMate3   Flow 4.2 4.1 4.1 4.2 3.5 3.5 3.7   Speed 5100 5100 5100 5100 5100 5100 5100   PI 4.5 3.7 3.5 3.9 6.9 6.5 6.2   Power (Serna) 3.6 3.6 3.7 3.7 3.8 3.8 3.7   LSL 4700 4700 4700 4700 4700 4700 -   Pulsatility Pulse Pulse Pulse Pulse Pulse Pulse Intermittent pulse       Acute on chronic combined systolic and diastolic heart failure, NYHA class 4  - NIDCM.  - Was not evaluated for OHTx as he quit smoking in April 2022.   -Diuresed well on Lasix drip;Transition to PO diuretics.  (has had allergic reactions to po Bumex and Torsemide)  - Continue 80 mg PO lasix bid.   - GDMT: Spironolactone   - See LVAD    Staphylococcus epidermidis bacteremia  - Blood cultures 6/20 and repeat 6/21 positive for Staph Epi. One of two blood cultures from 6/23 positive for Staph (taken prior to line exchange). Repeat cultures sent 6/25 NGTD  - IJ exchanged on 6/23, IABP exchanged 6/23  - ID recommended 14 day course of  vancomycin from VAD implantation on 6/29 with end date: 7/12/22. Vancomycin discontinued per CTS with concerns for elevated sCr. ID with new recs to complete 7d course of daptomycin, which was completed 7/7/22    CKD (chronic kidney disease) stage 3, GFR 30-59 ml/min  -SCr baseline ~ 1.5-1.7    Leukocytosis  -on empiric Vanc and Cefepime for total of 5 days (end date 7/23)  -ID consulted appreciate their recommendations  -WBC improved    Hyponatremia  -Resolved  -Hypervolemic,  -Discontinued salt tablets   -Improvement in Na with diuresis    Temporal lobe seizure  -No post ictal period  -Appreciate Neurology consult.  EEG ordered for 1 hour.  Study was done; findings consistent with L temporal lobe seizure.   -orthostatic BP negative  -device interrogation negative   - Neurology starting Keppra 1000 mg BID for partial temporal lobe seizure on EEG.       Uncontrolled diabetes mellitus  Admitted 5/24-5/27 with hyperglycemia hyperosmolar syndrome  - Hgb A1C 9.2 on 5/20/22  - Endocrine following, on insulin gtt    Tingling in extremities  - Pt reports paresthesias in right toes and fingers x 3 days   - Possible radiculopathy vs electrolyte abnormality vs neurovascular etiology   - Consulted General Neurology, suspect compression from surgical positioning/carpal tunnel syndrome. Recommended brace to L hand/wrist. If no improvement, will need outpatient EMG/NCS        DEYA Martinez  Heart Transplant  [facility] - Cardiology Stepdown

## 2022-07-26 NOTE — PROGRESS NOTES
07/26/2022  Ruma Li    Current provider:  Josh Pulido MD    Device interrogation:  TXP LVAD INTERROGATIONS 7/26/2022 7/26/2022 7/25/2022 7/25/2022 7/25/2022 7/25/2022 7/25/2022   Type HeartMate3 HeartMate3 HeartMate3 HeartMate3 HeartMate3 HeartMate3 HeartMate3   Flow 4.2 4.1 4.1 4.2 3.5 3.5 3.7   Speed 5100 5100 5100 5100 5100 5100 5100   PI 4.5 3.7 3.5 3.9 6.9 6.5 6.2   Power (Serna) 3.6 3.6 3.7 3.7 3.8 3.8 3.7   LSL 4700 4700 4700 4700 4700 4700 -   Pulsatility Pulse Pulse Pulse Pulse Pulse Pulse Intermittent pulse          Rounded on Kevan Queen to ensure all mechanical assist device settings (IABP or VAD) were appropriate and all parameters were within limits.  I was able to ensure all back up equipment was present, the staff had no issues, and the Perfusion Department daily rounding was complete.      For implantable VADs: Interrogation of Ventricular assist device was performed with analysis of device parameters and review of device function. I have personally reviewed the interrogation findings and agree with findings as stated.     In emergency, the nursing units have been notified to contact the perfusion department either by:  Calling [phone] from 630am to 4pm Mon thru Fri, utilizing the On-Call Finder functionality of Epic and searching for Perfusion, or by contacting the hospital  from 4pm to 630am and on weekends and asking to speak with the perfusionist on call.    12:22 PM

## 2022-07-26 NOTE — CARE UPDATE
RAPID RESPONSE NURSE ROUND       Rounding completed with charge RN, Hedy. No concerns verbalized at this time. Instructed to call [phone] for further concerns or assistance.

## 2022-07-27 DIAGNOSIS — Z95.811 LVAD (LEFT VENTRICULAR ASSIST DEVICE) PRESENT: Primary | ICD-10-CM

## 2022-07-27 DIAGNOSIS — Z00.6 EXAMINATION OF PARTICIPANT IN CLINICAL TRIAL: ICD-10-CM

## 2022-07-27 LAB
ALBUMIN SERPL BCP-MCNC: 3.3 G/DL (ref 3.5–5.2)
ALLENS TEST: ABNORMAL
ALP SERPL-CCNC: 132 U/L (ref 55–135)
ALT SERPL W/O P-5'-P-CCNC: 15 U/L (ref 10–44)
ANION GAP SERPL CALC-SCNC: 9 MMOL/L (ref 8–16)
AST SERPL-CCNC: 28 U/L (ref 10–40)
BASOPHILS # BLD AUTO: 0.05 K/UL (ref 0–0.2)
BASOPHILS NFR BLD: 0.5 % (ref 0–1.9)
BILIRUB SERPL-MCNC: 0.8 MG/DL (ref 0.1–1)
BNP SERPL-MCNC: 198 PG/ML (ref 0–99)
BUN SERPL-MCNC: 12 MG/DL (ref 6–20)
CALCIUM SERPL-MCNC: 10.1 MG/DL (ref 8.7–10.5)
CHLORIDE SERPL-SCNC: 101 MMOL/L (ref 95–110)
CO2 SERPL-SCNC: 26 MMOL/L (ref 23–29)
CREAT SERPL-MCNC: 1.6 MG/DL (ref 0.5–1.4)
CRP SERPL-MCNC: 6 MG/L (ref 0–8.2)
DELSYS: ABNORMAL
DIFFERENTIAL METHOD: ABNORMAL
EOSINOPHIL # BLD AUTO: 0.2 K/UL (ref 0–0.5)
EOSINOPHIL NFR BLD: 2 % (ref 0–8)
ERYTHROCYTE [DISTWIDTH] IN BLOOD BY AUTOMATED COUNT: 18.2 % (ref 11.5–14.5)
EST. GFR  (AFRICAN AMERICAN): >60 ML/MIN/1.73 M^2
EST. GFR  (NON AFRICAN AMERICAN): 54.2 ML/MIN/1.73 M^2
FIO2: 21
GLUCOSE SERPL-MCNC: 214 MG/DL (ref 70–110)
HCO3 UR-SCNC: 29.4 MMOL/L (ref 24–28)
HCT VFR BLD AUTO: 29.1 % (ref 40–54)
HGB BLD-MCNC: 8.9 G/DL (ref 14–18)
IMM GRANULOCYTES # BLD AUTO: 0.05 K/UL (ref 0–0.04)
IMM GRANULOCYTES NFR BLD AUTO: 0.5 % (ref 0–0.5)
INR PPP: 2.6 (ref 0.8–1.2)
LDH SERPL L TO P-CCNC: 290 U/L (ref 110–260)
LYMPHOCYTES # BLD AUTO: 1.4 K/UL (ref 1–4.8)
LYMPHOCYTES NFR BLD: 14.1 % (ref 18–48)
MCH RBC QN AUTO: 24.7 PG (ref 27–31)
MCHC RBC AUTO-ENTMCNC: 30.6 G/DL (ref 32–36)
MCV RBC AUTO: 81 FL (ref 82–98)
MODE: ABNORMAL
MONOCYTES # BLD AUTO: 0.7 K/UL (ref 0.3–1)
MONOCYTES NFR BLD: 7.4 % (ref 4–15)
NEUTROPHILS # BLD AUTO: 7.6 K/UL (ref 1.8–7.7)
NEUTROPHILS NFR BLD: 75.5 % (ref 38–73)
NRBC BLD-RTO: 0 /100 WBC
PCO2 BLDA: 50.1 MMHG (ref 35–45)
PH SMN: 7.38 [PH] (ref 7.35–7.45)
PLATELET # BLD AUTO: 525 K/UL (ref 150–450)
PMV BLD AUTO: 9 FL (ref 9.2–12.9)
PO2 BLDA: 30 MMHG (ref 40–60)
POC BE: 4 MMOL/L
POC SATURATED O2: 55 % (ref 95–100)
POC TCO2: 31 MMOL/L (ref 24–29)
POCT GLUCOSE: 128 MG/DL (ref 70–110)
POCT GLUCOSE: 133 MG/DL (ref 70–110)
POCT GLUCOSE: 143 MG/DL (ref 70–110)
POCT GLUCOSE: 163 MG/DL (ref 70–110)
POCT GLUCOSE: 173 MG/DL (ref 70–110)
POCT GLUCOSE: 175 MG/DL (ref 70–110)
POCT GLUCOSE: 89 MG/DL (ref 70–110)
POTASSIUM SERPL-SCNC: 4.7 MMOL/L (ref 3.5–5.1)
PROT SERPL-MCNC: 8.2 G/DL (ref 6–8.4)
PROTHROMBIN TIME: 25.7 SEC (ref 9–12.5)
PROVIDER NOTIFIED: ABNORMAL
RBC # BLD AUTO: 3.6 M/UL (ref 4.6–6.2)
SAMPLE: ABNORMAL
SITE: ABNORMAL
SODIUM SERPL-SCNC: 136 MMOL/L (ref 136–145)
WBC # BLD AUTO: 10.01 K/UL (ref 3.9–12.7)

## 2022-07-27 PROCEDURE — 25000003 PHARM REV CODE 250: Performed by: INTERNAL MEDICINE

## 2022-07-27 PROCEDURE — 20600001 HC STEP DOWN PRIVATE ROOM

## 2022-07-27 PROCEDURE — 25000003 PHARM REV CODE 250: Performed by: PHYSICIAN ASSISTANT

## 2022-07-27 PROCEDURE — 25000003 PHARM REV CODE 250

## 2022-07-27 PROCEDURE — 99900035 HC TECH TIME PER 15 MIN (STAT)

## 2022-07-27 PROCEDURE — 27000248 HC VAD-ADDITIONAL DAY

## 2022-07-27 PROCEDURE — 93750 PR INTERROGATE VENT ASSIST DEV, IN PERSON, W PHYSICIAN ANALYSIS: ICD-10-PCS | Mod: ,,, | Performed by: INTERNAL MEDICINE

## 2022-07-27 PROCEDURE — 25000003 PHARM REV CODE 250: Performed by: STUDENT IN AN ORGANIZED HEALTH CARE EDUCATION/TRAINING PROGRAM

## 2022-07-27 PROCEDURE — 99232 PR SUBSEQUENT HOSPITAL CARE,LEVL II: ICD-10-PCS | Mod: ,,, | Performed by: NURSE PRACTITIONER

## 2022-07-27 PROCEDURE — 85025 COMPLETE CBC W/AUTO DIFF WBC: CPT | Performed by: NURSE PRACTITIONER

## 2022-07-27 PROCEDURE — 63600175 PHARM REV CODE 636 W HCPCS: Performed by: NURSE PRACTITIONER

## 2022-07-27 PROCEDURE — 83615 LACTATE (LD) (LDH) ENZYME: CPT | Performed by: THORACIC SURGERY (CARDIOTHORACIC VASCULAR SURGERY)

## 2022-07-27 PROCEDURE — 93750 INTERROGATION VAD IN PERSON: CPT | Mod: ,,, | Performed by: INTERNAL MEDICINE

## 2022-07-27 PROCEDURE — 63600175 PHARM REV CODE 636 W HCPCS: Mod: JG | Performed by: NURSE PRACTITIONER

## 2022-07-27 PROCEDURE — 99233 PR SUBSEQUENT HOSPITAL CARE,LEVL III: ICD-10-PCS | Mod: ,,, | Performed by: NURSE PRACTITIONER

## 2022-07-27 PROCEDURE — 80053 COMPREHEN METABOLIC PANEL: CPT | Performed by: NURSE PRACTITIONER

## 2022-07-27 PROCEDURE — 85610 PROTHROMBIN TIME: CPT | Performed by: PHYSICIAN ASSISTANT

## 2022-07-27 PROCEDURE — 63600175 PHARM REV CODE 636 W HCPCS: Performed by: PHYSICIAN ASSISTANT

## 2022-07-27 PROCEDURE — 25000003 PHARM REV CODE 250: Performed by: THORACIC SURGERY (CARDIOTHORACIC VASCULAR SURGERY)

## 2022-07-27 PROCEDURE — 27000685 HC LVAD KIT (30 DAY SUPPLY)

## 2022-07-27 PROCEDURE — A4216 STERILE WATER/SALINE, 10 ML: HCPCS | Performed by: THORACIC SURGERY (CARDIOTHORACIC VASCULAR SURGERY)

## 2022-07-27 PROCEDURE — 25000003 PHARM REV CODE 250: Performed by: ANESTHESIOLOGY

## 2022-07-27 PROCEDURE — 86140 C-REACTIVE PROTEIN: CPT | Performed by: STUDENT IN AN ORGANIZED HEALTH CARE EDUCATION/TRAINING PROGRAM

## 2022-07-27 PROCEDURE — 99232 SBSQ HOSP IP/OBS MODERATE 35: CPT | Mod: ,,, | Performed by: NURSE PRACTITIONER

## 2022-07-27 PROCEDURE — 83880 ASSAY OF NATRIURETIC PEPTIDE: CPT | Performed by: STUDENT IN AN ORGANIZED HEALTH CARE EDUCATION/TRAINING PROGRAM

## 2022-07-27 PROCEDURE — 99233 SBSQ HOSP IP/OBS HIGH 50: CPT | Mod: ,,, | Performed by: NURSE PRACTITIONER

## 2022-07-27 RX ORDER — WARFARIN 3 MG/1
3 TABLET ORAL DAILY
Qty: 30 TABLET | Refills: 11 | Status: ON HOLD | OUTPATIENT
Start: 2022-07-27 | End: 2022-09-06 | Stop reason: SDUPTHER

## 2022-07-27 RX ORDER — GABAPENTIN 100 MG/1
100 CAPSULE ORAL 3 TIMES DAILY
Qty: 90 CAPSULE | Refills: 11 | Status: ON HOLD | OUTPATIENT
Start: 2022-07-27 | End: 2022-09-06 | Stop reason: HOSPADM

## 2022-07-27 RX ORDER — POTASSIUM CHLORIDE 20 MEQ/1
40 TABLET, EXTENDED RELEASE ORAL 3 TIMES DAILY
Qty: 180 TABLET | Refills: 11 | Status: SHIPPED | OUTPATIENT
Start: 2022-07-27 | End: 2022-08-04 | Stop reason: SDUPTHER

## 2022-07-27 RX ORDER — BUSPIRONE HYDROCHLORIDE 7.5 MG/1
7.5 TABLET ORAL DAILY
Qty: 30 TABLET | Refills: 11 | Status: SHIPPED | OUTPATIENT
Start: 2022-07-28 | End: 2022-08-04 | Stop reason: SDUPTHER

## 2022-07-27 RX ORDER — FERROUS GLUCONATE 324(37.5)
324 TABLET ORAL
Qty: 30 TABLET | Refills: 11 | Status: SHIPPED | OUTPATIENT
Start: 2022-07-28 | End: 2022-08-04 | Stop reason: SDUPTHER

## 2022-07-27 RX ORDER — TIZANIDINE 2 MG/1
2 TABLET ORAL EVERY 8 HOURS PRN
Qty: 90 TABLET | Refills: 0 | Status: ON HOLD | OUTPATIENT
Start: 2022-07-27 | End: 2022-09-06 | Stop reason: HOSPADM

## 2022-07-27 RX ORDER — OMEGA-3-ACID ETHYL ESTERS 1 G/1
2 CAPSULE, LIQUID FILLED ORAL 2 TIMES DAILY
Qty: 360 CAPSULE | Refills: 3 | Status: SHIPPED | OUTPATIENT
Start: 2022-07-27 | End: 2023-02-07

## 2022-07-27 RX ORDER — SPIRONOLACTONE 25 MG/1
25 TABLET ORAL DAILY
Qty: 30 TABLET | Refills: 11 | Status: SHIPPED | OUTPATIENT
Start: 2022-07-27 | End: 2022-08-04 | Stop reason: SDUPTHER

## 2022-07-27 RX ORDER — FAMOTIDINE 40 MG/1
40 TABLET, FILM COATED ORAL DAILY
Qty: 30 TABLET | Refills: 11 | Status: SHIPPED | OUTPATIENT
Start: 2022-07-28 | End: 2022-08-04 | Stop reason: SDUPTHER

## 2022-07-27 RX ORDER — AMOXICILLIN 250 MG
2 CAPSULE ORAL DAILY
Qty: 60 TABLET | Refills: 11 | Status: ON HOLD | OUTPATIENT
Start: 2022-07-27 | End: 2022-12-16 | Stop reason: HOSPADM

## 2022-07-27 RX ORDER — LANOLIN ALCOHOL/MO/W.PET/CERES
800 CREAM (GRAM) TOPICAL 2 TIMES DAILY
Qty: 120 TABLET | Refills: 11 | Status: SHIPPED | OUTPATIENT
Start: 2022-07-27 | End: 2022-08-04 | Stop reason: SDUPTHER

## 2022-07-27 RX ORDER — TALC
6 POWDER (GRAM) TOPICAL NIGHTLY PRN
Status: DISCONTINUED | OUTPATIENT
Start: 2022-07-27 | End: 2022-07-28 | Stop reason: HOSPADM

## 2022-07-27 RX ORDER — FUROSEMIDE 80 MG/1
80 TABLET ORAL 2 TIMES DAILY
Qty: 60 TABLET | Refills: 11 | Status: SHIPPED | OUTPATIENT
Start: 2022-07-27 | End: 2022-08-18

## 2022-07-27 RX ORDER — INSULIN ASPART 100 [IU]/ML
10 INJECTION, SOLUTION INTRAVENOUS; SUBCUTANEOUS 3 TIMES DAILY
Qty: 30 ML | Refills: 3 | Status: ON HOLD | OUTPATIENT
Start: 2022-07-27 | End: 2022-09-06 | Stop reason: SDUPTHER

## 2022-07-27 RX ORDER — LEVETIRACETAM 1000 MG/1
1000 TABLET ORAL 2 TIMES DAILY
Qty: 60 TABLET | Refills: 11 | Status: SHIPPED | OUTPATIENT
Start: 2022-07-27 | End: 2022-08-04 | Stop reason: SDUPTHER

## 2022-07-27 RX ADMIN — INSULIN ASPART 10 UNITS: 100 INJECTION, SOLUTION INTRAVENOUS; SUBCUTANEOUS at 09:07

## 2022-07-27 RX ADMIN — Medication 10 ML: at 05:07

## 2022-07-27 RX ADMIN — Medication 10 ML: at 06:07

## 2022-07-27 RX ADMIN — ACETAMINOPHEN 650 MG: 325 TABLET ORAL at 05:07

## 2022-07-27 RX ADMIN — INSULIN ASPART 10 UNITS: 100 INJECTION, SOLUTION INTRAVENOUS; SUBCUTANEOUS at 01:07

## 2022-07-27 RX ADMIN — Medication 800 MG: at 09:07

## 2022-07-27 RX ADMIN — Medication 800 MG: at 08:07

## 2022-07-27 RX ADMIN — ACETAMINOPHEN 650 MG: 325 TABLET ORAL at 09:07

## 2022-07-27 RX ADMIN — ALTEPLASE 2 MG: 2.2 INJECTION, POWDER, LYOPHILIZED, FOR SOLUTION INTRAVENOUS at 03:07

## 2022-07-27 RX ADMIN — MILRINONE LACTATE IN DEXTROSE 0.25 MCG/KG/MIN: 200 INJECTION, SOLUTION INTRAVENOUS at 06:07

## 2022-07-27 RX ADMIN — POTASSIUM CHLORIDE 40 MEQ: 20 TABLET, EXTENDED RELEASE ORAL at 03:07

## 2022-07-27 RX ADMIN — POTASSIUM CHLORIDE 40 MEQ: 20 TABLET, EXTENDED RELEASE ORAL at 09:07

## 2022-07-27 RX ADMIN — WARFARIN SODIUM 3 MG: 3 TABLET ORAL at 04:07

## 2022-07-27 RX ADMIN — INSULIN ASPART 10 UNITS: 100 INJECTION, SOLUTION INTRAVENOUS; SUBCUTANEOUS at 06:07

## 2022-07-27 RX ADMIN — GABAPENTIN 100 MG: 100 CAPSULE ORAL at 08:07

## 2022-07-27 RX ADMIN — Medication 324 MG: at 08:07

## 2022-07-27 RX ADMIN — SPIRONOLACTONE 25 MG: 25 TABLET, FILM COATED ORAL at 08:07

## 2022-07-27 RX ADMIN — BUSPIRONE HYDROCHLORIDE 7.5 MG: 5 TABLET ORAL at 05:07

## 2022-07-27 RX ADMIN — LEVETIRACETAM 1000 MG: 500 TABLET, FILM COATED ORAL at 09:07

## 2022-07-27 RX ADMIN — MIRTAZAPINE 15 MG: 15 TABLET, FILM COATED ORAL at 09:07

## 2022-07-27 RX ADMIN — OXYCODONE HYDROCHLORIDE 10 MG: 10 TABLET ORAL at 04:07

## 2022-07-27 RX ADMIN — INSULIN DETEMIR 22 UNITS: 100 INJECTION, SOLUTION SUBCUTANEOUS at 10:07

## 2022-07-27 RX ADMIN — GABAPENTIN 100 MG: 100 CAPSULE ORAL at 09:07

## 2022-07-27 RX ADMIN — Medication 6 MG: at 01:07

## 2022-07-27 RX ADMIN — LEVETIRACETAM 1000 MG: 500 TABLET, FILM COATED ORAL at 08:07

## 2022-07-27 RX ADMIN — ACETAMINOPHEN 650 MG: 325 TABLET ORAL at 01:07

## 2022-07-27 RX ADMIN — MILRINONE LACTATE IN DEXTROSE 0.25 MCG/KG/MIN: 200 INJECTION, SOLUTION INTRAVENOUS at 05:07

## 2022-07-27 RX ADMIN — FUROSEMIDE 80 MG: 80 TABLET ORAL at 08:07

## 2022-07-27 RX ADMIN — FAMOTIDINE 40 MG: 20 TABLET ORAL at 08:07

## 2022-07-27 RX ADMIN — GABAPENTIN 100 MG: 100 CAPSULE ORAL at 03:07

## 2022-07-27 RX ADMIN — Medication 10 ML: at 12:07

## 2022-07-27 RX ADMIN — TIZANIDINE 2 MG: 2 TABLET ORAL at 05:07

## 2022-07-27 RX ADMIN — TIZANIDINE 2 MG: 2 TABLET ORAL at 01:07

## 2022-07-27 RX ADMIN — FUROSEMIDE 80 MG: 80 TABLET ORAL at 06:07

## 2022-07-27 RX ADMIN — TIZANIDINE 2 MG: 2 TABLET ORAL at 09:07

## 2022-07-27 RX ADMIN — POTASSIUM CHLORIDE 40 MEQ: 20 TABLET, EXTENDED RELEASE ORAL at 08:07

## 2022-07-27 NOTE — ASSESSMENT & PLAN NOTE
Endocrinology consulted for BG management.   BG goal 140-180    - Continue levemir 22 units HS.   - Continue Novolog 10 units TIDWM and prn for BG excursions MDC (150/25) SSI.  - Continue Novolog 4 units prn for overnight snack  - BG monitoring ac/hs and moderate dose correction scale.   - Hypoglycemia protocol in place.     ** Please notify Endocrine for any change and/or advance in diet**  ** Please call Endocrine for any BG related issues **    Discharge Planning: Recommend patient discharge on home regimen (levemir 24 units HS and novolog 12 units with meals plus correction scale).  BG logs with dosing instructions provided at bedside.  Instructed patient to submit BG logs for review in one week or sooner if experiencing persistent high (>200) or low (<100) blood sugars.  Patient prescriptions sent, patient has all necessary diabetic supplies.  Will send patient portal message to allow ongoing communication for all blood glucose related issues.  Will setup hospital discharge follow up appointment and DM education.  Reviewed topics related to DM including: the need for insulin, how insulin works, what makes it a high risk medication, the importance of follow up with either PCP or endocrine provider, importance of and how to check BG, how to record BG on logs, how to administer insulin, appropriate insulin administration sites, importance of rotating injection sites, hyper/hypoglycemia, how and when to treat hypoglycemia, when to hold insulin, how the correction scale works, importance of storing unused insulin in the refrigerator, and when to seek medical attention.  Patient verbalized understanding, answered all questions to patient's satisfaction.

## 2022-07-27 NOTE — PROGRESS NOTES
[facility] - Cardiology Stepdown  Heart Transplant  Progress Note    Patient Name: Kevan Queen  MRN: [id number]  Admission Date: 6/13/2022  Hospital Length of Stay: 44 days  Attending Physician: Josh Pulido MD  Primary Care Provider: ORALIA Cline  Principal Problem:LVAD (left ventricular assist device) present    Subjective:     Interval History: Patient reports no new complaints today. States he spoke with family yesterday and is going to go home with hospice. No complaints this am.      Continuous Infusions:   sodium chloride 0.9% 5 mL/hr at 06/27/22 0055    sodium chloride 0.9% Stopped (07/18/22 2000)    milrinone 20mg/100ml D5W (200mcg/ml) 0.25 mcg/kg/min (07/27/22 0559)     Scheduled Meds:   acetaminophen  650 mg Oral Q8H    busPIRone  7.5 mg Oral Daily    famotidine  40 mg Oral Daily    ferrous gluconate  324 mg Oral Daily with breakfast    furosemide  80 mg Oral BID    gabapentin  100 mg Oral TID    insulin aspart U-100  10 Units Subcutaneous TIDWM    insulin detemir U-100  22 Units Subcutaneous QHS    INV aspirin/placebo  100 mg Oral Daily    levETIRAcetam  1,000 mg Oral BID    magnesium oxide  800 mg Oral BID    mirtazapine  15 mg Oral Nightly    polyethylene glycol  17 g Oral Daily    potassium chloride  40 mEq Oral TID    sodium chloride 0.9%  10 mL Intravenous Q6H    spironolactone  25 mg Oral Daily    tiZANidine  2 mg Oral Q8H    warfarin  3 mg Oral Daily     PRN Meds:albuterol sulfate, bisacodyL, dextrose 10%, dextrose 10%, docusate sodium, glucagon (human recombinant), glucose, glucose, insulin aspart U-100, insulin aspart U-100, LIDOcaine HCL 20 mg/ml (2%), magnesium sulfate IVPB, melatonin, ondansetron, oxyCODONE, polyethylene glycol, promethazine (PHENERGAN) IVPB, sodium chloride 0.9%, sodium chloride 0.9%, Flushing PICC Protocol **AND** sodium chloride 0.9% **AND** sodium chloride 0.9%, sodium chloride 0.9%, traMADoL    Review of patient's allergies indicates:    Allergen Reactions    Aspirin Other (See Comments)     Mr. Thacker is enrolled in Dr. Paige's Alon Trial and cannot have any aspirin and/or aspirin-containing products. DO NOT cancel any orders for INV Aspirin 100 mg/Placebo. If you have questions, please contact Isabel @ [phone], [phone],[email or web address], secure chat or MS Teams message.    Bumex [bumetanide] Hives    Lactose Diarrhea     Other reaction(s): Abdominal distension, gaseous    Torsemide Hives     Objective:     Vital Signs (Most Recent):  Temp: 98.1 °F (36.7 °C) (07/27/22 0719)  Pulse: 104 (07/27/22 0719)  Resp: 18 (07/27/22 0719)  BP: 112/81 (07/27/22 0719)  SpO2: 97 % (07/27/22 0719)   Vital Signs (24h Range):  Temp:  [97.4 °F (36.3 °C)-98.8 °F (37.1 °C)] 98.1 °F (36.7 °C)  Pulse:  [] 104  Resp:  [16-20] 18  SpO2:  [97 %-100 %] 97 %  BP: ()/(0-81) 112/81     Patient Vitals for the past 72 hrs (Last 3 readings):   Weight   07/26/22 0800 83.5 kg (184 lb 1.4 oz)   07/25/22 0800 83.5 kg (184 lb 1.4 oz)       Body mass index is 23.01 kg/m².      Intake/Output Summary (Last 24 hours) at 7/27/2022 1011  Last data filed at 7/27/2022 0559  Gross per 24 hour   Intake 697.62 ml   Output 1225 ml   Net -527.38 ml       CVP:  [9 mmHg] 9 mmHg    Telemetry: ST    Physical Exam  Vitals and nursing note reviewed.   Constitutional:       Appearance: Normal appearance.   HENT:      Head: Normocephalic and atraumatic.   Eyes:      Extraocular Movements: Extraocular movements intact.      Conjunctiva/sclera: Conjunctivae normal.   Cardiovascular:      Rate and Rhythm: Regular rhythm. Tachycardia present.      Comments: Smooth VAD hum  Pulmonary:      Breath sounds: Normal breath sounds.   Abdominal:      Palpations: Abdomen is soft.   Musculoskeletal:         General: No swelling.      Cervical back: Neck supple.      Right lower leg: No edema.      Left lower leg: No edema.   Skin:     General: Skin is dry.      Capillary Refill: Capillary  refill takes 2 to 3 seconds.   Neurological:      General: No focal deficit present.      Mental Status: He is alert and oriented to person, place, and time.      Motor: No weakness.   Psychiatric:         Mood and Affect: Mood normal.         Behavior: Behavior normal.         Thought Content: Thought content normal.         Judgment: Judgment normal.       Significant Labs:  CBC:  Recent Labs   Lab 07/22/22 0424 07/23/22  0424 07/25/22  0854 07/26/22  0504   WBC 10.69  --  9.48 9.89   RBC 3.28*  --  3.25* 3.34*   HGB 8.2*  --  8.0* 8.2*   HCT 26.3* 29* 26.7* 26.7*   *  --  511* 547*   MCV 80*  --  82 80*   MCH 25.0*  --  24.6* 24.6*   MCHC 31.2*  --  30.0* 30.7*       BNP:  Recent Labs   Lab 07/22/22  0424 07/25/22  0525 07/27/22  0537   * 337* 198*       CMP:  Recent Labs   Lab 07/24/22  0508 07/24/22  1657 07/25/22  0525 07/25/22  1734 07/26/22  0504   *  --  128*  --  117*   CALCIUM 9.9  --  9.6  --  9.7   ALBUMIN 3.1*  --  3.1*  --  3.2*   PROT 7.9  --  8.0  --  8.0   *  --  136  --  134*   K 4.1  4.1   < > 4.1  4.1 4.3 4.1  4.1   CO2 28  --  26  --  25     --  102  --  100   BUN 15  --  13  --  11   CREATININE 1.6*  --  1.4  --  1.5*   ALKPHOS 136*  --  132  --  130   ALT 13  --  15  --  13   AST 26  --  27  --  23   BILITOT 0.8  --  0.7  --  0.7    < > = values in this interval not displayed.        Coagulation:   Recent Labs   Lab 07/21/22  0430 07/22/22  0424 07/23/22  0427 07/25/22  0525 07/26/22  0504 07/27/22  0537   INR 1.8* 2.0*   < > 2.4* 2.7* 2.6*   APTT 29.3 27.3  --   --   --   --     < > = values in this interval not displayed.       LDH:  Recent Labs   Lab 07/25/22  0525 07/26/22  0504 07/27/22  0537    251 290*       Microbiology:  Microbiology Results (last 7 days)       Procedure Component Value Units Date/Time    Blood culture [[id number]] Collected: 07/19/22 0006    Order Status: Completed Specimen: Blood from Peripheral, Antecubital, Left Updated:  "07/24/22 0612     Blood Culture, Routine No growth after 5 days.            I have reviewed all pertinent labs within the past 24 hours.    Estimated Creatinine Clearance: 79.6 mL/min (A) (based on SCr of 1.5 mg/dL (H)).    Diagnostic Results:  I have reviewed and interpreted all pertinent imaging results/findings within the past 24 hours.    Assessment and Plan:     38 yo male with NIDCM with BiV systolic heart failure, on home Milrinone at 0.375 mcg/kg/min, presented at [facility] 4/[facility]/22 ,was not evaluated for OHT as he has recently quit smoking in April 2022 but was approved for VAD with plan to begin OHT evaluation in upcoming months if Mr Queen is stable and suitable for OHT eval (blood group A), 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 Went to ED at [facility] 6/10 but left after waiting 4 hours. Had clinic appointment with us today, but arrived to [facility] early this morning and decided to go to the ED instead. Baseline Lasix dose is 80 mg bid. Reports taking 240 mg qd past 3 days with no improvement. BNP is 1701, up from 898 on 6/2 and 49 on 5/24. sCr is 1.8 with baseline ~ 1.5-1.7. sPO2 on RA is 93%. Wife at bedside    He has been given Lasix 80 mg IVP in the ED with plan to start Lasix gtt at 20 mg/hr           * LVAD (left ventricular assist device) present  - S/P DT HM3 with MVR plus Protek Duo for RV support 6/29 (Grecia)  - RVAD removed with concern for hemolysis.  - HTS primary   - GASTON trial   - INR was supratherapeuitc and coumadin was held. Goal 2.0-3.0.  Pharmacy to dose   - LDH stable  - CVP 9 SVO2: 55%. Will continue milrinone 0.25.   -Continue po Lasix.  (does not tolerate " po Bumex or Torsemide due to hives)   -RHC with speed 5100 RA: 14, RV: 41/8 (14), PA: 42/17 (25), PWP: 16/17 (15), CO: 5.32, CI: 2.51  - ECHO 7/24 with speed at 5100- EF: 10%, LVEDD: 6.3mc   -Awaiting caregiver to be checked off on dressing for discharge     Procedure: Device Interrogation Including analysis of device parameters  Current Settings: Ventricular Assist Device  Review of device function is stable    TXP LVAD INTERROGATIONS 7/27/2022 7/27/2022 7/27/2022 7/26/2022 7/26/2022 7/26/2022 7/26/2022   Type HeartMate3 HeartMate3 HeartMate3 HeartMate3 HeartMate3 HeartMate3 HeartMate3   Flow 3.8 3.7 4.0 4.0 4.1 4.2 4.2   Speed 5150 5100 5100 5100 5100 5100 5100   PI 4.9 5.3 3.7 4.5 4.8 4.6 4.5   Power (Serna) 3.6 3.7 3.7 3.6 3.8 3.6 3.6   LSL 4750 - 4700 4700 4700 4700 4700   Pulsatility Intermittent pulse Intermittent pulse Intermittent pulse Intermittent pulse Pulse Pulse Pulse       Temporal lobe seizure  -No post ictal period  -Appreciate Neurology consult.  EEG ordered for 1 hour.  Study was done; findings consistent with L temporal lobe seizure.   -orthostatic BP negative  -device interrogation negative   - Neurology starting Keppra 1000 mg BID for partial temporal lobe seizure on EEG.       Leukocytosis  -on empiric Vanc and Cefepime for total of 5 days (end date 7/23)  -ID consulted appreciate their recommendations  -WBC improved    Tingling in extremities  - Pt reports paresthesias in right toes and fingers x 3 days   - Possible radiculopathy vs electrolyte abnormality vs neurovascular etiology   - Consulted General Neurology, suspect compression from surgical positioning/carpal tunnel syndrome. Recommended brace to L hand/wrist. If no improvement, will need outpatient EMG/NCS    Hyponatremia  -Resolved  -Hypervolemic,  -Discontinued salt tablets   -Improvement in Na with diuresis    Staphylococcus epidermidis bacteremia  - Blood cultures 6/20 and repeat 6/21 positive for Staph Epi. One of two blood  cultures from 6/23 positive for Staph (taken prior to line exchange). Repeat cultures sent 6/25 NGTD  - IJ exchanged on 6/23, IABP exchanged 6/23  - ID recommended 14 day course of vancomycin from VAD implantation on 6/29 with end date: 7/12/22. Vancomycin discontinued per CTS with concerns for elevated sCr. ID with new recs to complete 7d course of daptomycin, which was completed 7/7/22    Uncontrolled diabetes mellitus  Admitted 5/24-5/27 with hyperglycemia hyperosmolar syndrome  - Hgb A1C 9.2 on 5/20/22  - Endocrine following, on insulin gtt    CKD (chronic kidney disease) stage 3, GFR 30-59 ml/min  -SCr baseline ~ 1.5-1.7    Acute on chronic combined systolic and diastolic heart failure, NYHA class 4  - NIDCM.  - Was not evaluated for OHTx as he quit smoking in April 2022.   -Diuresed well on Lasix drip;Transition to PO diuretics.  (has had allergic reactions to po Bumex and Torsemide)  - Continue 80 mg PO lasix bid.   - GDMT: Spironolactone   - See LVAD      Fausto Reyes NP  Heart Transplant  [facility] - Cardiology Stepdown

## 2022-07-27 NOTE — PROGRESS NOTES
Completed discharge equipment reconcilliation with patient and caregiver. Confirmed all the equipment serial numbers and patient agreed and verbalized understanding of all equipment. VAD flowsheet updated with equipment for discharge

## 2022-07-27 NOTE — RESEARCH
Study Title:  Antiplatelet Removal and HemocompatIbility EventS with the HeartMate 3 Pump   Protocol: CRD_971  IRB #/Approval Date: [id number]  Sponsor: [employer]  : Dr. Luis F Paige   Others present: spouse/SO    Alon Discharge visit is conducted in hosp room.  Protocol required elements were collected and entered into EDC; including Vital Signs. Outpatient Alon MELARA was delivered to Mr. Queen with instructions regarding how and when to take. He voiced understanding.  Also explained that I will see him at his follow up visit on August 4, 2022 and that an EKG is ordered for the trial.

## 2022-07-27 NOTE — PROGRESS NOTES
Discharge    Pt presents in room with SO. Pt voices agreement with plan to discharge to home today on Primacor with Franki  [phone], fax [phone]. Pt will go to [facility] in [place]. Pt will also go to outpt PT/OT and denies preference for same. Referral made to [facility] in [place], ph [phone], fax [phone]. Pt's SO will transport pt home. Pt reports coping well and denies further needs, questions, concerns at this time and none indicated. Providing psychosocial and counseling support, education, resources, assistance and discharge planning as indicated. SW remains available.

## 2022-07-27 NOTE — ASSESSMENT & PLAN NOTE
Titrate insulin slowly to avoid hypoglycemia as the risk of hypoglycemia increases with decreased creatinine clearance.    Estimated Creatinine Clearance: 74.7 mL/min (A) (based on SCr of 1.6 mg/dL (H)).

## 2022-07-27 NOTE — NURSING
Pt's wife IS CHECKED OFF on LVAd dressing change. She was following the steps of dressing change, maintaining the sterile field and monitor time.

## 2022-07-27 NOTE — PROGRESS NOTES
DISCHARGE NOTE:    Kevan Queen is a 37 y.o. male s/p HM3 implantation from 6.26.22 is preparing for discharge today.     Past Medical History:   Diagnosis Date    Arthritis     Cardiomyopathy     CHF (congestive heart failure) 10/01/2020    Diabetes mellitus     Dilated cardiomyopathy 10/26/2020    Drug abuse 10/2020    Hyperosmolar hyperglycemic state (HHS) 5/25/2022    ICD (implantable cardioverter-defibrillator) in place 10/26/2020    Muscle cramping 6/15/2022    Renal disorder        Hospital Course: During his post operative course Mr. Queen was started on keppra for EEG demonstrating temporal lob seizure activity. He is continue iv milrinone on discharge. He was enrolled in the [place] investigational aspirin study. His inr today is 2.6. Plan to continue warfarin 3mg orally daily.     Pharmacy Interventions/Recommendations:  1) INR Goal: 2-3    2) Antiplatelet Agents: INV ASA     3) Heparin Bridging:  UFH    4) INR Follow-Up/Discharge Needs:  Monday with coumadin clinic and home health     See list of discharge medication for dosing instructions.     Kevan Queen and his caregiver verbalized their understanding and had the opportunity to ask questions.      Discharge Medications:     Medication List      START taking these medications    famotidine 40 MG tablet  Commonly known as: PEPCID  Take 1 tablet (40 mg total) by mouth once daily.  Start taking on: July 28, 2022     ferrous gluconate 324 mg (37.5 mg iron) Tab tablet  Take 1 tablet (324 mg total) by mouth daily with breakfast.  Start taking on: July 28, 2022     gabapentin 100 MG capsule  Commonly known as: NEURONTIN  Take 1 capsule (100 mg total) by mouth 3 (three) times daily.     INV ASPIRIN 100MG/PLACEBO  Take 1 capsul (100 ) mg by mouth once daily. FOR INVESTIGATIONAL USE ONLY. Protocol: GASTON III subject: [id number]     levETIRAcetam 1000 MG tablet  Commonly known as: KEPPRA  Take 1 tablet (1,000 mg total) by mouth 2 (two) times  "daily.     magnesium oxide 400 mg (241.3 mg magnesium) tablet  Commonly known as: MAG-OX  Take 2 tablets (800 mg total) by mouth 2 (two) times daily.     omega-3 acid ethyl esters 1 gram capsule  Commonly known as: LOVAZA  Take 2 capsules (2 g total) by mouth 2 (two) times daily.     senna-docusate 8.6-50 mg 8.6-50 mg per tablet  Commonly known as: SENNA-S  Take 2 tablets by mouth once daily.     tiZANidine 2 MG tablet  Commonly known as: ZANAFLEX  Take 1 tablet (2 mg total) by mouth every 8 (eight) hours as needed (muscle cramps).     warfarin 3 MG tablet  Commonly known as: COUMADIN  Take 1 tablet (3 mg total) by mouth Daily.        CHANGE how you take these medications    busPIRone 7.5 MG tablet  Commonly known as: BUSPAR  Take 1 tablet (7.5 mg total) by mouth once daily at 6am.  Start taking on: July 28, 2022  What changed: when to take this     insulin aspart U-100 100 unit/mL (3 mL) Inpn pen  Commonly known as: NovoLOG  Inject 10 Units into the skin 3 (three) times daily.  What changed: how much to take     insulin detemir U-100 100 unit/mL (3 mL) Inpn pen  Commonly known as: Levemir FLEXTOUCH  Inject 22 Units into the skin once daily.  What changed: how much to take     potassium chloride SA 20 MEQ tablet  Commonly known as: K-DUR,KLOR-CON  Take 2 tablets (40 mEq total) by mouth 3 (three) times daily.  What changed: when to take this        CONTINUE taking these medications    albuterol 90 mcg/actuation inhaler  Commonly known as: PROVENTIL/VENTOLIN HFA     furosemide 80 MG tablet  Commonly known as: LASIX  Take 1 tablet (80 mg total) by mouth 2 (two) times daily.     milrinone 20mg/100ml D5W (200mcg/ml) 20 mg/100 mL (200 mcg/mL) infusion  Commonly known as: PRIMACOR  Inject 34.875 mcg/min into the vein continuous.     mirtazapine 15 MG tablet  Commonly known as: REMERON     pen needle, diabetic 31 gauge x 1/4" Ndle  1 Device by Misc.(Non-Drug; Combo Route) route 4 (four) times daily.     spironolactone 25 MG " tablet  Commonly known as: ALDACTONE  Take 1 tablet (25 mg total) by mouth once daily.     traMADoL 50 mg tablet  Commonly known as: ULTRAM  Take 1 tablet (50 mg total) by mouth every 6 (six) hours as needed for Pain.     TRUE METRIX GLUCOSE METER Misc  Generic drug: blood-glucose meter  Use as instructed     TRUE METRIX GLUCOSE TEST STRIP Strp  Generic drug: blood sugar diagnostic  Use to test blood glucose 4 (four) times daily.     TRUEPLUS LANCETS 33 gauge Misc  Generic drug: lancets  Use to test blood glucose 4 (four) times daily.        STOP taking these medications    digoxin 125 mcg tablet  Commonly known as: LANOXIN     EScitalopram oxalate 5 MG Tab  Commonly known as: LEXAPRO     LANTUS SOLOSTAR U-100 INSULIN glargine 100 units/mL SubQ pen  Generic drug: insulin     metOLazone 2.5 MG tablet  Commonly known as: ZAROXOLYN     metoprolol succinate 25 MG 24 hr tablet  Commonly known as: TOPROL-XL     milrinone 1 mg/mL injection  Commonly known as: PRIMACOR     pantoprazole 40 MG tablet  Commonly known as: PROTONIX     promethazine 12.5 MG Tab  Commonly known as: PHENERGAN     sucralfate 1 gram tablet  Commonly known as: CARAFATE           Where to Get Your Medications      These medications were sent to [facility]  [address] [place] [id number]    Hours: Mon-Fri, 8a-5:30p   · INV ASPIRIN 100MG/PLACEBO     These medications were sent to [facility] [address], [place] [address]    Hours: Mon-Fri 7a-7p, Sat-Sun 10a-4p Phone: [phone]   · busPIRone 7.5 MG tablet  · famotidine 40 MG tablet  · ferrous gluconate 324 mg (37.5 mg iron) Tab tablet  · furosemide 80 MG tablet  · gabapentin 100 MG capsule  · insulin aspart U-100 100 unit/mL (3 mL) Inpn pen  · insulin detemir U-100 100 unit/mL (3 mL) Inpn pen  · levETIRAcetam 1000 MG tablet  · magnesium oxide 400 mg (241.3 mg magnesium) tablet  · omega-3 acid ethyl esters 1 gram  capsule  · potassium chloride SA 20 MEQ tablet  · senna-docusate 8.6-50 mg 8.6-50 mg per tablet  · spironolactone 25 MG tablet  · tiZANidine 2 MG tablet  · warfarin 3 MG tablet

## 2022-07-27 NOTE — SUBJECTIVE & OBJECTIVE
Interval History: Patient reports no new complaints today. States he spoke with family yesterday and is going to go home with hospice. No complaints this am.      Continuous Infusions:   sodium chloride 0.9% 5 mL/hr at 06/27/22 0055    sodium chloride 0.9% Stopped (07/18/22 2000)    milrinone 20mg/100ml D5W (200mcg/ml) 0.25 mcg/kg/min (07/27/22 0559)     Scheduled Meds:   acetaminophen  650 mg Oral Q8H    busPIRone  7.5 mg Oral Daily    famotidine  40 mg Oral Daily    ferrous gluconate  324 mg Oral Daily with breakfast    furosemide  80 mg Oral BID    gabapentin  100 mg Oral TID    insulin aspart U-100  10 Units Subcutaneous TIDWM    insulin detemir U-100  22 Units Subcutaneous QHS    INV aspirin/placebo  100 mg Oral Daily    levETIRAcetam  1,000 mg Oral BID    magnesium oxide  800 mg Oral BID    mirtazapine  15 mg Oral Nightly    polyethylene glycol  17 g Oral Daily    potassium chloride  40 mEq Oral TID    sodium chloride 0.9%  10 mL Intravenous Q6H    spironolactone  25 mg Oral Daily    tiZANidine  2 mg Oral Q8H    warfarin  3 mg Oral Daily     PRN Meds:albuterol sulfate, bisacodyL, dextrose 10%, dextrose 10%, docusate sodium, glucagon (human recombinant), glucose, glucose, insulin aspart U-100, insulin aspart U-100, LIDOcaine HCL 20 mg/ml (2%), magnesium sulfate IVPB, melatonin, ondansetron, oxyCODONE, polyethylene glycol, promethazine (PHENERGAN) IVPB, sodium chloride 0.9%, sodium chloride 0.9%, Flushing PICC Protocol **AND** sodium chloride 0.9% **AND** sodium chloride 0.9%, sodium chloride 0.9%, traMADoL    Review of patient's allergies indicates:   Allergen Reactions    Aspirin Other (See Comments)     Mr. Thacker is enrolled in Dr. Paige's Alon Trial and cannot have any aspirin and/or aspirin-containing products. DO NOT cancel any orders for INV Aspirin 100 mg/Placebo. If you have questions, please contact Isabel @ [phone], [phone],[email or web address], secure chat or MS Teams message.    Bumex  [bumetanide] Hives    Lactose Diarrhea     Other reaction(s): Abdominal distension, gaseous    Torsemide Hives     Objective:     Vital Signs (Most Recent):  Temp: 98.1 °F (36.7 °C) (07/27/22 0719)  Pulse: 104 (07/27/22 0719)  Resp: 18 (07/27/22 0719)  BP: 112/81 (07/27/22 0719)  SpO2: 97 % (07/27/22 0719)   Vital Signs (24h Range):  Temp:  [97.4 °F (36.3 °C)-98.8 °F (37.1 °C)] 98.1 °F (36.7 °C)  Pulse:  [] 104  Resp:  [16-20] 18  SpO2:  [97 %-100 %] 97 %  BP: ()/(0-81) 112/81     Patient Vitals for the past 72 hrs (Last 3 readings):   Weight   07/26/22 0800 83.5 kg (184 lb 1.4 oz)   07/25/22 0800 83.5 kg (184 lb 1.4 oz)       Body mass index is 23.01 kg/m².      Intake/Output Summary (Last 24 hours) at 7/27/2022 1011  Last data filed at 7/27/2022 0559  Gross per 24 hour   Intake 697.62 ml   Output 1225 ml   Net -527.38 ml       CVP:  [9 mmHg] 9 mmHg    Telemetry: ST    Physical Exam  Vitals and nursing note reviewed.   Constitutional:       Appearance: Normal appearance.   HENT:      Head: Normocephalic and atraumatic.   Eyes:      Extraocular Movements: Extraocular movements intact.      Conjunctiva/sclera: Conjunctivae normal.   Cardiovascular:      Rate and Rhythm: Regular rhythm. Tachycardia present.      Comments: Smooth VAD hum  Pulmonary:      Breath sounds: Normal breath sounds.   Abdominal:      Palpations: Abdomen is soft.   Musculoskeletal:         General: No swelling.      Cervical back: Neck supple.      Right lower leg: No edema.      Left lower leg: No edema.   Skin:     General: Skin is dry.      Capillary Refill: Capillary refill takes 2 to 3 seconds.   Neurological:      General: No focal deficit present.      Mental Status: He is alert and oriented to person, place, and time.      Motor: No weakness.   Psychiatric:         Mood and Affect: Mood normal.         Behavior: Behavior normal.         Thought Content: Thought content normal.         Judgment: Judgment normal.        Significant Labs:  CBC:  Recent Labs   Lab 07/22/22  0424 07/23/22  0424 07/25/22  0854 07/26/22  0504   WBC 10.69  --  9.48 9.89   RBC 3.28*  --  3.25* 3.34*   HGB 8.2*  --  8.0* 8.2*   HCT 26.3* 29* 26.7* 26.7*   *  --  511* 547*   MCV 80*  --  82 80*   MCH 25.0*  --  24.6* 24.6*   MCHC 31.2*  --  30.0* 30.7*       BNP:  Recent Labs   Lab 07/22/22  0424 07/25/22  0525 07/27/22  0537   * 337* 198*       CMP:  Recent Labs   Lab 07/24/22  0508 07/24/22  1657 07/25/22  0525 07/25/22  1734 07/26/22  0504   *  --  128*  --  117*   CALCIUM 9.9  --  9.6  --  9.7   ALBUMIN 3.1*  --  3.1*  --  3.2*   PROT 7.9  --  8.0  --  8.0   *  --  136  --  134*   K 4.1  4.1   < > 4.1  4.1 4.3 4.1  4.1   CO2 28  --  26  --  25     --  102  --  100   BUN 15  --  13  --  11   CREATININE 1.6*  --  1.4  --  1.5*   ALKPHOS 136*  --  132  --  130   ALT 13  --  15  --  13   AST 26  --  27  --  23   BILITOT 0.8  --  0.7  --  0.7    < > = values in this interval not displayed.        Coagulation:   Recent Labs   Lab 07/21/22  0430 07/22/22 0424 07/23/22 0427 07/25/22  0525 07/26/22  0504 07/27/22  0537   INR 1.8* 2.0*   < > 2.4* 2.7* 2.6*   APTT 29.3 27.3  --   --   --   --     < > = values in this interval not displayed.       LDH:  Recent Labs   Lab 07/25/22  0525 07/26/22  0504 07/27/22  0537    251 290*       Microbiology:  Microbiology Results (last 7 days)       Procedure Component Value Units Date/Time    Blood culture [[id number]] Collected: 07/19/22 0006    Order Status: Completed Specimen: Blood from Peripheral, Antecubital, Left Updated: 07/24/22 0612     Blood Culture, Routine No growth after 5 days.            I have reviewed all pertinent labs within the past 24 hours.    Estimated Creatinine Clearance: 79.6 mL/min (A) (based on SCr of 1.5 mg/dL (H)).    Diagnostic Results:  I have reviewed and interpreted all pertinent imaging results/findings within the past 24 hours.

## 2022-07-27 NOTE — PLAN OF CARE
Patient cooperative and pleasant with routine care and procedures.  Fall precautions reviewed and patient verbalized understanding.  Reviewed sternal precautions.  Patient checked off on alarms and awaiting spouse to be checked off on dressing change before able to DC to home.  Resting quietly without complaints.  Will continue to monitor.

## 2022-07-27 NOTE — DISCHARGE SUMMARY
"[facility] - Cardiology Stepdown  Heart Transplant  Discharge Summary      Patient Name: Kevan Queen  MRN: [id number]  Admission Date: 6/13/2022  Hospital Length of Stay: 44 days  Discharge Date and Time: 07/27/2022 4:04 PM  Attending Physician: Josh Pulido MD   Discharging Provider: Fausto Reyes NP  Primary Care Provider: ORALIA Cline     HPI: 36 yo male with NIDCM with BiV systolic heart failure, on home Milrinone at 0.375 mcg/kg/min, presented at [facility] 4/2/22 ,was not evaluated for OHT as he has recently quit smoking in April 2022 but was approved for VAD with plan to begin OHT evaluation in upcoming months if Mr Queen is stable and suitable for OHT eval (blood group A), 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 Went to ED at [facility] 6/10 but left after waiting 4 hours. Had clinic appointment with us today, but arrived to [facility] early this morning and decided to go to the ED instead. Baseline Lasix dose is 80 mg bid. Reports taking 240 mg qd past 3 days with no improvement. BNP is 1701, up from 898 on 6/2 and 49 on 5/24. sCr is 1.8 with baseline ~ 1.5-1.7. sPO2 on RA is 93%. Wife at bedside  He has been given Lasix 80 mg IVP in the ED with plan to start Lasix gtt at 20 mg/hr    Procedure(s) (LRB):  INSERTION, CATHETER, RIGHT HEART (Right)     Hospital Course: Pt was aggressively diuresed and workup was completed while admitted. He had HMIII placed on 6/29 by Dr Paige. 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 He also completed a course of IV Abx for staph epi. He was weaned off  but he had to restarted due to RVF. He " was eventually transitioned to milrinone(secondary to shortage) which he tolerated well. He was discharged home once medically ready and will f/u in clininc in ~1 week with labs prior.     Consults (From admission, onward)        Status Ordering Provider     Inpatient consult to Neurology  Once        Provider:  (Not yet assigned)    Completed TERESA ARGUELLES     Inpatient consult to Neurology  Once        Provider:  (Not yet assigned)    Completed MILAN ERED     Inpatient consult to PICC team ([facility] [place])  Once        Provider:  (Not yet assigned)    Completed MONY REYNA     Inpatient consult to Hepatology  Once        Provider:  (Not yet assigned)    Completed VIN MCNAMARA     Inpatient consult to Registered Dietitian/Nutritionist  Once        Provider:  (Not yet assigned)    Completed MONY MORALES     Inpatient consult to Endocrinology  Once        Provider:  (Not yet assigned)    Completed MONY MORALES     Inpatient consult to Registered Dietitian/Nutritionist  Once        Provider:  (Not yet assigned)    Completed MARY CARTER JR     Inpatient consult to Interventional Cardiology  Once        Provider:  (Not yet assigned)    Completed LAY ESQUIVEL     Inpatient consult to Infectious Diseases  Once        Provider:  (Not yet assigned)    Completed LAY ESQUIVEL     Inpatient consult to Endocrinology  Once        Provider:  (Not yet assigned)    Completed LAI OLMOS     Inpatient consult to Palliative Care  Once        Provider:  (Not yet assigned)    Completed REYNA CLEANING        Pending Diagnostic Studies:     Procedure Component Value Units Date/Time    Basic metabolic panel [[id number]] Collected: 06/14/22 2013    Order Status: Sent Lab Status: In process Updated: 06/14/22 2015    Specimen: Blood     Basic metabolic panel  [[id number]] Collected: 06/29/22 1539    Order Status: Sent Lab Status: In process Updated: 06/29/22 1539    Specimen: Blood     Basic  metabolic panel  [[id number]] Collected: 06/22/22 1347    Order Status: Sent Lab Status: In process Updated: 06/22/22 1347    Specimen: Blood     C-reactive protein [[id number]] Collected: 07/25/22 0525    Order Status: Sent Lab Status: In process Updated: 07/25/22 0526    Specimen: Blood     CBC auto differential [[id number]] Collected: 07/27/22 0537    Order Status: Sent Lab Status: In process Updated: 07/27/22 0538    Specimen: Blood     Comprehensive metabolic panel [[id number]] Collected: 07/27/22 0537    Order Status: Sent Lab Status: In process Updated: 07/27/22 0538    Specimen: Blood     EKG 12-lead [[id number]]     Order Status: Sent Lab Status: No result     Lactic acid, plasma [[id number]] Collected: 06/24/22 0330    Order Status: Sent Lab Status: In process Updated: 06/24/22 0331    Specimen: Blood     Magnesium [[id number]] Collected: 07/27/22 0537    Order Status: Sent Lab Status: In process Updated: 07/27/22 0538    Specimen: Blood     Magnesium [[id number]] Collected: 07/24/22 1657    Order Status: Sent Lab Status: In process Updated: 07/24/22 1702    Specimen: Blood     Magnesium [[id number]] Collected: 06/14/22 2013    Order Status: Sent Lab Status: In process Updated: 06/14/22 2015    Specimen: Blood     Potassium [[id number]] Collected: 07/27/22 0537    Order Status: Sent Lab Status: In process Updated: 07/27/22 0538    Specimen: Blood     Potassium [[id number]] Collected: 07/24/22 1657    Order Status: Sent Lab Status: In process Updated: 07/24/22 1702    Specimen: Blood     Vancomycin, Random [[id number]] Collected: 07/21/22 0430    Order Status: Sent Lab Status: In process Updated: 07/21/22 0431    Specimen: Blood         Final Active Diagnoses:    Diagnosis Date Noted POA    PRINCIPAL PROBLEM:  LVAD (left ventricular assist device) present [Z95.811] 06/30/2022 Not Applicable    Examination of participant in clinical trial [Z00.6] 07/25/2022 Not Applicable    Seizure-like activity [R56.9] 07/20/2022  Unknown    Leukocytosis [D72.829] 07/20/2022 Unknown    Temporal lobe seizure [G40.109] 07/20/2022 Unknown    Tingling in extremities [R20.2] 07/13/2022 No    Cardiogenic shock [R57.0]  Yes    Hyponatremia [E87.1] 07/07/2022 Unknown    Encounter for weaning from ventilator [Z99.11]  Not Applicable    Surgical wound present [T14.8XXA] 06/29/2022 No    Dilated cardiomyopathy [I42.0]  Yes    Staphylococcus epidermidis bacteremia [R78.81, B95.7] 06/22/2022 No    Palliative care encounter [Z51.5] 06/16/2022 Not Applicable    Goals of care, counseling/discussion [Z71.89]  Not Applicable    Advanced care planning/counseling discussion [Z71.89]  Not Applicable    Tachycardia [R00.0]  Yes    SOB (shortness of breath) [R06.02] 06/13/2022 Yes    Uncontrolled diabetes mellitus [E11.65] 05/25/2022 Yes    CKD (chronic kidney disease) stage 3, GFR 30-59 ml/min [N18.30] 04/29/2022 Yes    Congestive heart failure [I50.9]  Yes    NICM (nonischemic cardiomyopathy) [I42.8] 10/26/2020 Yes    Transaminitis [R74.01] 10/26/2020 No    Acute on chronic combined systolic and diastolic heart failure, NYHA class 4 [I50.43] 10/25/2020 Unknown      Problems Resolved During this Admission:    Diagnosis Date Noted Date Resolved POA    Muscle cramping [R25.2] 06/15/2022 06/30/2022 No    Acute decompensated heart failure [I50.9]  07/05/2022 Yes      Discharged Condition: fair    Disposition: Home or Self Care    Follow Up:    Patient Instructions:      Ambulatory referral/consult to Neurology Epilepsy   Standing Status: Future   Referral Priority: Routine Referral Type: Consultation   Referral Reason: Specialty Services Required   Requested Specialty: Neurology   Number of Visits Requested: 1     Ambulatory referral/consult to Physical/Occupational Therapy   Standing Status: Future   Referral Priority: Routine Referral Type: Physical Medicine   Referral Reason: Specialty Services Required   Requested Specialty: Physical Therapy    Number of Visits Requested: 1     Ambulatory referral/consult to Physical/Occupational Therapy   Standing Status: Future   Referral Priority: Routine Referral Type: Physical Medicine   Referral Reason: Specialty Services Required   Requested Specialty: Occupational Therapy   Number of Visits Requested: 1     Ambulatory referral/consult to Anticoagulation Monitoring   Standing Status: Future   Referral Priority: Routine Referral Type: Consultation   Referral Reason: Specialty Services Required   Requested Specialty: Cardiology   Number of Visits Requested: 1     Medications:  Reconciled Home Medications:      Medication List      START taking these medications    famotidine 40 MG tablet  Commonly known as: PEPCID  Take 1 tablet (40 mg total) by mouth once daily.  Start taking on: July 28, 2022     ferrous gluconate 324 mg (37.5 mg iron) Tab tablet  Take 1 tablet (324 mg total) by mouth daily with breakfast.  Start taking on: July 28, 2022     gabapentin 100 MG capsule  Commonly known as: NEURONTIN  Take 1 capsule (100 mg total) by mouth 3 (three) times daily.     INV ASPIRIN 100MG/PLACEBO  Take 1 capsul (100 ) mg by mouth once daily. FOR INVESTIGATIONAL USE ONLY. Protocol: GASTON III subject: [id number]     levETIRAcetam 1000 MG tablet  Commonly known as: KEPPRA  Take 1 tablet (1,000 mg total) by mouth 2 (two) times daily.     magnesium oxide 400 mg (241.3 mg magnesium) tablet  Commonly known as: MAG-OX  Take 2 tablets (800 mg total) by mouth 2 (two) times daily.     omega-3 acid ethyl esters 1 gram capsule  Commonly known as: LOVAZA  Take 2 capsules (2 g total) by mouth 2 (two) times daily.     senna-docusate 8.6-50 mg 8.6-50 mg per tablet  Commonly known as: SENNA-S  Take 2 tablets by mouth once daily.     tiZANidine 2 MG tablet  Commonly known as: ZANAFLEX  Take 1 tablet (2 mg total) by mouth every 8 (eight) hours as needed (muscle cramps).     warfarin 3 MG tablet  Commonly known as: COUMADIN  Take 1 tablet (3  "mg total) by mouth Daily.        CHANGE how you take these medications    busPIRone 7.5 MG tablet  Commonly known as: BUSPAR  Take 1 tablet (7.5 mg total) by mouth once daily at 6am.  Start taking on: July 28, 2022  What changed: when to take this     insulin aspart U-100 100 unit/mL (3 mL) Inpn pen  Commonly known as: NovoLOG  Inject 10 Units into the skin 3 (three) times daily.  What changed: how much to take     insulin detemir U-100 100 unit/mL (3 mL) Inpn pen  Commonly known as: Levemir FLEXTOUCH  Inject 22 Units into the skin once daily.  What changed: how much to take     potassium chloride SA 20 MEQ tablet  Commonly known as: K-DUR,KLOR-CON  Take 2 tablets (40 mEq total) by mouth 3 (three) times daily.  What changed: when to take this        CONTINUE taking these medications    albuterol 90 mcg/actuation inhaler  Commonly known as: PROVENTIL/VENTOLIN HFA  INHALE 2 PUFFS BY MOUTH EVERY 4 HOURS AS NEEDED FOR COUGH OR WHEEZING     furosemide 80 MG tablet  Commonly known as: LASIX  Take 1 tablet (80 mg total) by mouth 2 (two) times daily.     milrinone 20mg/100ml D5W (200mcg/ml) 20 mg/100 mL (200 mcg/mL) infusion  Commonly known as: PRIMACOR  Inject 34.875 mcg/min into the vein continuous.     mirtazapine 15 MG tablet  Commonly known as: REMERON  Take 15 mg by mouth nightly.     pen needle, diabetic 31 gauge x 1/4" Ndle  1 Device by Misc.(Non-Drug; Combo Route) route 4 (four) times daily.     spironolactone 25 MG tablet  Commonly known as: ALDACTONE  Take 1 tablet (25 mg total) by mouth once daily.     traMADoL 50 mg tablet  Commonly known as: ULTRAM  Take 1 tablet (50 mg total) by mouth every 6 (six) hours as needed for Pain.     TRUE METRIX GLUCOSE METER Misc  Generic drug: blood-glucose meter  Use as instructed     TRUE METRIX GLUCOSE TEST STRIP Strp  Generic drug: blood sugar diagnostic  Use to test blood glucose 4 (four) times daily.     TRUEPLUS LANCETS 33 gauge Misc  Generic drug: lancets  Use to test " blood glucose 4 (four) times daily.        STOP taking these medications    digoxin 125 mcg tablet  Commonly known as: LANOXIN     EScitalopram oxalate 5 MG Tab  Commonly known as: LEXAPRO     LANTUS SOLOSTAR U-100 INSULIN glargine 100 units/mL SubQ pen  Generic drug: insulin     metOLazone 2.5 MG tablet  Commonly known as: ZAROXOLYN     metoprolol succinate 25 MG 24 hr tablet  Commonly known as: TOPROL-XL     milrinone 1 mg/mL injection  Commonly known as: PRIMACOR     pantoprazole 40 MG tablet  Commonly known as: PROTONIX     promethazine 12.5 MG Tab  Commonly known as: PHENERGAN     sucralfate 1 gram tablet  Commonly known as: CARAFATE          Fausto Reyes NP  Heart Transplant  [facility] - Cardiology Stepdown

## 2022-07-27 NOTE — PROGRESS NOTES
07/27/2022  John Alaniz    Current provider:  Josh Pulido MD    Device interrogation:  TXP LVAD INTERROGATIONS 7/27/2022 7/27/2022 7/27/2022 7/27/2022 7/26/2022 7/26/2022 7/26/2022   Type HeartMate3 HeartMate3 HeartMate3 HeartMate3 HeartMate3 HeartMate3 HeartMate3   Flow 3.8 3.8 3.7 4.0 4.0 4.1 4.2   Speed 5100 5150 5100 5100 5100 5100 5100   PI 5.3 4.9 5.3 3.7 4.5 4.8 4.6   Power (Serna) 3.8 3.6 3.7 3.7 3.6 3.8 3.6   LSL 4700 4750 - 4700 4700 4700 4700   Pulsatility Intermittent pulse Intermittent pulse Intermittent pulse Intermittent pulse Intermittent pulse Pulse Pulse          Rounded on Kevan Queen to ensure all mechanical assist device settings (IABP or VAD) were appropriate and all parameters were within limits.  I was able to ensure all back up equipment was present, the staff had no issues, and the Perfusion Department daily rounding was complete.      For implantable VADs: Interrogation of Ventricular assist device was performed with analysis of device parameters and review of device function. I have personally reviewed the interrogation findings and agree with findings as stated.     In emergency, the nursing units have been notified to contact the perfusion department either by:  Calling [id number] from 630am to 4pm Mon thru Fri, utilizing the On-Call Finder functionality of Epic and searching for Perfusion, or by contacting the hospital  from 4pm to 630am and on weekends and asking to speak with the perfusionist on call.    1:49 PM

## 2022-07-27 NOTE — ASSESSMENT & PLAN NOTE
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  - HTS primary   - GASTON trial   - INR was supratherapeuitc and coumadin was held. Goal 2.0-3.0.  Pharmacy to dose   - LDH stable  - CVP 9 SVO2: 55%. Will continue milrinone 0.25.   -Continue po Lasix.  (does not tolerate po Bumex or Torsemide due to hives)   -RHC with speed 5100 RA: 14, RV: 41/8 (14), PA: 42/17 (25), PWP: 16/17 (15), CO: 5.32, CI: 2.51  - ECHO 7/24 with speed at 5100- EF: 10%, LVEDD: 6.3mc   -Awaiting caregiver to be checked off on dressing for discharge     Procedure: Device Interrogation Including analysis of device parameters  Current Settings: Ventricular Assist Device  Review of device function is stable    TXP LVAD INTERROGATIONS 7/27/2022 7/27/2022 7/27/2022 7/26/2022 7/26/2022 7/26/2022 7/26/2022   Type HeartMate3 HeartMate3 HeartMate3 HeartMate3 HeartMate3 HeartMate3 HeartMate3   Flow 3.8 3.7 4.0 4.0 4.1 4.2 4.2   Speed 5150 5100 5100 5100 5100 5100 5100   PI 4.9 5.3 3.7 4.5 4.8 4.6 4.5   Power (Serna) 3.6 3.7 3.7 3.6 3.8 3.6 3.6   LSL 4750 - 4700 4700 4700 4700 4700   Pulsatility Intermittent pulse Intermittent pulse Intermittent pulse Intermittent pulse Pulse Pulse Pulse

## 2022-07-27 NOTE — SUBJECTIVE & OBJECTIVE
"Interval HPI:   Overnight events: Remains in CSU. NAEON. BG reasonably well controlled on current regimen. LVAD. Discharge planning. 6 Days Post-Op  Eating:  Diet [facility]; Consistent Carbohydrate; Fluid - 1500mL   100%  Nausea: No  Hypoglycemia and intervention: No  Fever: No  TPN and/or TF: No      BP (!) 89/59 (Patient Position: Lying)   Pulse 103   Temp 98.1 °F (36.7 °C) (Oral)   Resp 18   Ht 6' 3" (1.905 m)   Wt 83.5 kg (184 lb 1.4 oz)   SpO2 99%   BMI 23.01 kg/m²     Labs Reviewed and Include    Recent Labs   Lab 07/27/22  1229   *   CALCIUM 10.1   ALBUMIN 3.3*   PROT 8.2      K 4.7   CO2 26      BUN 12   CREATININE 1.6*   ALKPHOS 132   ALT 15   AST 28   BILITOT 0.8     Lab Results   Component Value Date    WBC 10.01 07/27/2022    HGB 8.9 (L) 07/27/2022    HCT 29.1 (L) 07/27/2022    MCV 81 (L) 07/27/2022     (H) 07/27/2022     No results for input(s): TSH, FREET4 in the last 168 hours.  Lab Results   Component Value Date    HGBA1C 9.2 (H) 05/20/2022       Nutritional status:   Body mass index is 23.01 kg/m².  Lab Results   Component Value Date    ALBUMIN 3.3 (L) 07/27/2022    ALBUMIN 3.2 (L) 07/26/2022    ALBUMIN 3.1 (L) 07/25/2022     Lab Results   Component Value Date    PREALBUMIN 12 (L) 07/17/2022    PREALBUMIN 12 (L) 07/14/2022    PREALBUMIN 12 (L) 07/07/2022       Estimated Creatinine Clearance: 74.7 mL/min (A) (based on SCr of 1.6 mg/dL (H)).    Accu-Checks  Recent Labs     07/25/22  1157 07/25/22  1612 07/25/22  2047 07/26/22  0827 07/26/22  1212 07/26/22  1655 07/26/22  2154 07/27/22  0141 07/27/22  0805 07/27/22  1155   POCTGLUCOSE 142* 103 143* 127* 190* 124* 163* 133* 173* 175*       Current Medications and/or Treatments Impacting Glycemic Control  Immunotherapy:    Immunosuppressants       None          Steroids:   Hormones (From admission, onward)                Start     Stop Route Frequency Ordered    07/27/22 0231  melatonin tablet 6 mg         " -- Oral Nightly PRN 07/27/22 0132          Pressors:    Autonomic Drugs (From admission, onward)                None          Hyperglycemia/Diabetes Medications:   Antihyperglycemics (From admission, onward)                Start     Stop Route Frequency Ordered    07/18/22 1503  insulin aspart U-100 pen 4 Units         -- SubQ As needed (PRN) 07/18/22 1404    07/16/22 2100  insulin detemir U-100 pen 22 Units         -- SubQ Nightly 07/16/22 0636    07/13/22 1130  insulin aspart U-100 pen 10 Units         -- SubQ 3 times daily with meals 07/13/22 1106    07/10/22 1718  insulin aspart U-100 pen 1-10 Units         -- SubQ Before meals & nightly PRN 07/10/22 1618

## 2022-07-27 NOTE — PROGRESS NOTES
"[facility] - Cardiology Stepdown  Endocrinology  Progress Note    Admit Date: 6/13/2022     Reason for Consult: Management of  type 2 DM, Hyperglycemia     Surgical Procedure and Date: none    Diabetes diagnosis year: 4/21/21    Home Diabetes Medications:  Detemir  23  units daily and Aspart   12  units TIDWM and  Mod dose correction insulin    Lab Results   Component Value Date    HGBA1C 9.2 (H) 05/20/2022           How often checking glucose at home? 1-3 x day   BG readings on regimen: 180- up to 300 once  Hypoglycemia on the regimen?  yes once- related to not really eating after taking aspart   Missed doses on regimen?  no    Diabetes Complications include:     Hyperglycemia    Complicating diabetes co morbidities:   History of MI, CHF, and CKD      HPI:   Patient is a 37 y.o. male with a diagnosis of type 2 DM and NIDCM with BiV systolic heart failure. Patient was presented at [facility]/2/22  and was  approved for VAD. Also recently admitted with [facility]; he had clinic appointment last week to f/u recent admit for hyperglycemic hyperosmolar syndrome but did not come as he was "feeling too bad" presents to  ED with SOB. Endocrinology consulted for BG/ DM management.                Interval HPI:   Overnight events: Remains in CSU. NAEON. BG reasonably well controlled on current regimen. LVAD. Discharge planning. 6 Days Post-Op  Eating:  Diet [facility]; Consistent Carbohydrate; Fluid - 1500mL   100%  Nausea: No  Hypoglycemia and intervention: No  Fever: No  TPN and/or TF: No      BP (!) 89/59 (Patient Position: Lying)   Pulse 103   Temp 98.1 °F (36.7 °C) (Oral)   Resp 18   Ht 6' 3" (1.905 m)   Wt 83.5 kg (184 lb 1.4 oz)   SpO2 99%   BMI 23.01 kg/m²     Labs Reviewed and Include    Recent Labs   Lab 07/27/22  1229   *   CALCIUM 10.1   ALBUMIN 3.3*   PROT 8.2      K 4.7   CO2 26      BUN 12   CREATININE 1.6*   ALKPHOS 132   ALT 15   AST 28   BILITOT 0.8     Lab Results   Component " Value Date    WBC 10.01 07/27/2022    HGB 8.9 (L) 07/27/2022    HCT 29.1 (L) 07/27/2022    MCV 81 (L) 07/27/2022     (H) 07/27/2022     No results for input(s): TSH, FREET4 in the last 168 hours.  Lab Results   Component Value Date    HGBA1C 9.2 (H) 05/20/2022       Nutritional status:   Body mass index is 23.01 kg/m².  Lab Results   Component Value Date    ALBUMIN 3.3 (L) 07/27/2022    ALBUMIN 3.2 (L) 07/26/2022    ALBUMIN 3.1 (L) 07/25/2022     Lab Results   Component Value Date    PREALBUMIN 12 (L) 07/17/2022    PREALBUMIN 12 (L) 07/14/2022    PREALBUMIN 12 (L) 07/07/2022       Estimated Creatinine Clearance: 74.7 mL/min (A) (based on SCr of 1.6 mg/dL (H)).    Accu-Checks  Recent Labs     07/25/22  1157 07/25/22  1612 07/25/22  2047 07/26/22  0827 07/26/22  1212 07/26/22  1655 07/26/22  2154 07/27/22  0141 07/27/22  0805 07/27/22  1155   POCTGLUCOSE 142* 103 143* 127* 190* 124* 163* 133* 173* 175*       Current Medications and/or Treatments Impacting Glycemic Control  Immunotherapy:    Immunosuppressants       None          Steroids:   Hormones (From admission, onward)                Start     Stop Route Frequency Ordered    07/27/22 0231  melatonin tablet 6 mg         -- Oral Nightly PRN 07/27/22 0132          Pressors:    Autonomic Drugs (From admission, onward)                None          Hyperglycemia/Diabetes Medications:   Antihyperglycemics (From admission, onward)                Start     Stop Route Frequency Ordered    07/18/22 1503  insulin aspart U-100 pen 4 Units         -- SubQ As needed (PRN) 07/18/22 1404    07/16/22 2100  insulin detemir U-100 pen 22 Units         -- SubQ Nightly 07/16/22 0636    07/13/22 1130  insulin aspart U-100 pen 10 Units         -- SubQ 3 times daily with meals 07/13/22 1106    07/10/22 1718  insulin aspart U-100 pen 1-10 Units         -- SubQ Before meals & nightly PRN 07/10/22 1618            ASSESSMENT and PLAN    * LVAD (left ventricular assist device)  present  Managed per primary team  Avoid hypoglycemia        Uncontrolled diabetes mellitus  Endocrinology consulted for BG management.   BG goal 140-180    - Continue levemir 22 units HS.   - Continue Novolog 10 units TIDWM and prn for BG excursions [facility] (150/25) SSI.  - Continue Novolog 4 units prn for overnight snack  - BG monitoring ac/hs and moderate dose correction scale.   - Hypoglycemia protocol in place.     ** Please notify Endocrine for any change and/or advance in diet**  ** Please call Endocrine for any BG related issues **    Discharge Planning: Recommend patient discharge on home regimen (levemir 24 units HS and novolog 12 units with meals plus correction scale).  BG logs with dosing instructions provided at bedside.  Instructed patient to submit BG logs for review in one week or sooner if experiencing persistent high (>200) or low (<100) blood sugars.  Patient prescriptions sent, patient has all necessary diabetic supplies.  Will send patient portal message to allow ongoing communication for all blood glucose related issues.  Will setup hospital discharge follow up appointment and DM education.  Reviewed topics related to DM including: the need for insulin, how insulin works, what makes it a high risk medication, the importance of follow up with either PCP or endocrine provider, importance of and how to check BG, how to record BG on logs, how to administer insulin, appropriate insulin administration sites, importance of rotating injection sites, hyper/hypoglycemia, how and when to treat hypoglycemia, when to hold insulin, how the correction scale works, importance of storing unused insulin in the refrigerator, and when to seek medical attention.  Patient verbalized understanding, answered all questions to patient's satisfaction.            Acute on chronic combined systolic and diastolic heart failure, NYHA class 4  Optimize glucose control and  avoid hypoglycemia    Managed per primary.        Surgical wound present  Optimize BG for surgical wound healing.         CKD (chronic kidney disease) stage 3, GFR 30-59 ml/min    Titrate insulin slowly to avoid hypoglycemia as the risk of hypoglycemia increases with decreased creatinine clearance.    Estimated Creatinine Clearance: 74.7 mL/min (A) (based on SCr of 1.6 mg/dL (H)).          Yumiko Fowler NP  Endocrinology  [facility] - [facility]

## 2022-07-27 NOTE — PT/OT/SLP PROGRESS
Physical Therapy Discharge      Patient Name:  Kevan Queen   MRN:  [id number]    Pt has met all physical therapy goals. On PM attempt, pt independently ambulating in the hallway. Pt no longer in need acute therapy services. Please re-consult if mobility status changes. Recommend Home no needs once pt is medically stable for discharge.

## 2022-07-27 NOTE — NURSING
PT discharged. Awaiting or Milrinone to be delivered at bedside by infusion company. VSS. LVAD WNL.

## 2022-07-28 ENCOUNTER — TELEPHONE (OUTPATIENT)
Dept: TRANSPLANT | Facility: CLINIC | Age: 37
End: 2022-07-28
Payer: MEDICAID

## 2022-07-28 VITALS
BODY MASS INDEX: 22.89 KG/M2 | WEIGHT: 184.06 LBS | HEART RATE: 103 BPM | OXYGEN SATURATION: 100 % | DIASTOLIC BLOOD PRESSURE: 52 MMHG | SYSTOLIC BLOOD PRESSURE: 86 MMHG | HEIGHT: 75 IN | TEMPERATURE: 98 F | RESPIRATION RATE: 18 BRPM

## 2022-07-28 PROCEDURE — 25000003 PHARM REV CODE 250: Performed by: THORACIC SURGERY (CARDIOTHORACIC VASCULAR SURGERY)

## 2022-07-28 PROCEDURE — A4216 STERILE WATER/SALINE, 10 ML: HCPCS | Performed by: THORACIC SURGERY (CARDIOTHORACIC VASCULAR SURGERY)

## 2022-07-28 RX ADMIN — Medication 10 ML: at 12:07

## 2022-07-28 NOTE — PLAN OF CARE
SW was contacted by home case management. Nurse stated that [facility] never delivered medications and tubing at bed side. Nurse stated that the meds and tubing were delivered to the patient's home by mistake. There wasn't a family member that can bring meds and tubing to the hospital. Patient also lives 2 hours away.     SW got in touch with in house pharmacy in ER and asked if we had anything that we can do here that can be used to get patient to his home. Our pharmacy stated that there was nothing that can be done from the in house pharm at this time.     TREY contacted [facility] and spoke with Tracie. After a time of explaining and actually finding out what happened; Tracie stated that she was going to contact the pharmacist and . TREY received a phone call from Tracie again stating that the  was going to  the patient meds from his house and deliver them at bedside here in the hospital. According to Tracie the trip will take a while and meds should be here by 1:30am.     RAE Collins, MSW-SW  Medical Social Worker/  ER Department   [phone]

## 2022-07-28 NOTE — NURSING
The milrinone infusion pump delivered at bedside at 1:40 am. Home infusion pump set up done by patient's wife. Infusion pump set at 0.25 mcg/kg/min*93.7 kg. (7ml/hr). Pt went home at 1:55 am on wheelchair with his wife. Patient belongings and VAD equipments sent with patient.

## 2022-07-28 NOTE — NURSING
Patient's BG was 89 mg/dl. Pt asymptomatic. RN gave 2 cups of apple juice and rechecked the BG. BG is 128 mg/dl now. Pt have insulin detemir 22 U scheduled for 9 pm. RN attempted to call MD. Will continue to monitor.

## 2022-07-28 NOTE — TELEPHONE ENCOUNTER
Kevan Queen discharged status post VAD implant 6/29/2022(date).  Follow up phone call to patient/caregiver to discuss their implant/index hospitalization. Explained that we are looking to make improvements on our VAD program. Explained the scale of the survey; Strongly Agree, Agree, Neutral, Disagree, Strongly Disagree    I met with a VAD Coordinator prior to surgery who helped me understand what to expect:  Strongly Agree  My team (physicians, surgeons, nurses, coordinators, therapists, social workers, etc.) updated me and my family throughout my stay.  Strongly Agree    I felt prepared to go home with my VAD including what to do in the event of an alarm and who to call if there is an emergency.  Strongly Agree    I received quality care from my care team.  Agree    Additional comments: Shout out to the whole transplant team, Shira Wylie in 10th ICU, [place] 3rd floor ICU      Thanked patient/cargiver for their honest feedback.    Confirmed that patient made it home safely with their equipment and medications.  Reminded patient/caregiver to page if they have an emergency and that we look forward to seeing them at their first clinic visit.

## 2022-07-28 NOTE — NURSING
Pt supposed to get discharge today with milrinone infusion pump delivered at bedside. But the company delivered the infusion pump at his home which is 2 hr far away from hospital. Pt got his previous home infusion pump available with him which is set at 0.375 mcg/kg/min. Milrinone infusion at hospital is 0.25 mcg/kg/min. MD Baker notified about this. MD stated to send the patient with milrinone 0.375 mcg/kg/hr.tubing for infusion pump is not available. Charge RN notified SW. SW stated that the company will again get the meds, tubing and infusion pump from pt's home and deliver them at bedside by 1:30 am.

## 2022-07-28 NOTE — PROGRESS NOTES
Discharge follow-up:    Spoke to Vladimir Alas at [facility] regarding late delivery of pt's inotrope yesterday. Per RN note medication was delivered at 1:40 a.m. and pt left at 1:55 p.m.    Pt's medication was filled from [facility] office as they are the office that follows pt and services him when at home.     Per Enrique Bishop, the pharmacist at the  office did not transfer the hospital address and pt's room # to the delivery ticket so the medication was shipped to the pt's home in [place]. Around 10 p.m. this mistake was discovered so the medication was picked up from the pt's home and brought to [facility].     TREY requested that [facility] pts who are serviced out of  get their discharge medications filled at [place]. Mr. Alas states he will present this to his team and states this does involve transferring pts temporarily to [place] then back to .     SW following and available.

## 2022-07-28 NOTE — CARE UPDATE
"RAPID RESPONSE NURSE CHART REVIEW        Chart Reviewed: 07/27/2022, 11:56 PM    MRN: [id number]  Bed: 316/316 A    Dx: LVAD (left ventricular assist device) present    Kevan Queen has a past medical history of Arthritis, Cardiomyopathy, CHF (congestive heart failure), Diabetes mellitus, Dilated cardiomyopathy, Drug abuse, Hyperosmolar hyperglycemic state (HHS), ICD (implantable cardioverter-defibrillator) in place, Muscle cramping, and Renal disorder.    Last VS: BP (!) 86/52 (BP Location: Left arm, Patient Position: Lying)   Pulse (!) 134   Temp 98.3 °F (36.8 °C) (Oral)   Resp 18   Ht 6' 3" (1.905 m)   Wt 83.5 kg (184 lb 1.4 oz)   SpO2 100%   BMI 23.01 kg/m²     24H Vital Sign Range:  Temp:  [97.9 °F (36.6 °C)-99.3 °F (37.4 °C)]   Pulse:  []   Resp:  [18]   BP: ()/(0-81)   SpO2:  [92 %-100 %]     Level of Consciousness (AVPU): alert    Recent Labs     07/25/22  0854 07/26/22  0504 07/27/22  1229   WBC 9.48 9.89 10.01   HGB 8.0* 8.2* 8.9*   HCT 26.7* 26.7* 29.1*   * 547* 525*       Recent Labs     07/25/22  0525 07/25/22  1734 07/26/22  0504 07/27/22  1229     --  134* 136   K 4.1  4.1 4.3 4.1  4.1 4.7     --  100 101   CO2 26  --  25 26   CREATININE 1.4  --  1.5* 1.6*   *  --  117* 214*   MG 1.7 1.6 1.6  --         Recent Labs     07/25/22  1450 07/26/22  0502 07/27/22  0518   PH 7.350 7.372 7.377   PCO2 51.7* 49.8* 50.1*   PO2 29* 30* 30*   HCO3 28.5* 28.9* 29.4*   POCSATURATED 51* 54* 55*   BE 3 4 4        OXYGEN:  Flow (L/min): 1  Oxygen Concentration (%): 36  O2 Device (Oxygen Therapy): room air    MEWS score: 3    Bedside RN, Ana contacted. HR documented by PCT as 134 bpm. RN to reasses. No concerns verbalized at this time. Instructed to call [phone] for further concerns or assistance.    Yumiko Verma RN        "

## 2022-07-29 ENCOUNTER — PATIENT OUTREACH (OUTPATIENT)
Dept: ADMINISTRATIVE | Facility: CLINIC | Age: 37
End: 2022-07-29
Payer: MEDICAID

## 2022-07-29 NOTE — PROGRESS NOTES
C3 nurse spoke with Kevan Calix) for a TCC post hospital discharge follow up call. The patient does not have a scheduled HOSFU appointment with ORALIA Cline within 5-7 days post hospital discharge date 7/27/22. C3 nurse was unable to schedule HOSFU appointment in [facility].    Message sent to PCP staff requesting they contact patient and schedule follow up appointment.

## 2022-08-02 ENCOUNTER — TELEPHONE (OUTPATIENT)
Dept: CARDIOTHORACIC SURGERY | Facility: CLINIC | Age: 37
End: 2022-08-02
Payer: MEDICAID

## 2022-08-02 NOTE — TELEPHONE ENCOUNTER
Called pt following hospital discharge, s/p LVAD insertion.  Spoke with pt's SO and reiterated the need for pt to clean his mid-sternal and chest tube site incisions everyday with soap and water, and to keep them open to air.  Pt's SO instructed to clean and dress his driveline site, as instructed by the LVAD Coordinators.  Pt's SO informed that pt should walk as much as he can.  Reminded pt's SO that pt cannot drive until he is released to do so, and for pt to refrain from lifting, pushing, and pulling anything greater than 5 pounds for the first 6 weeks following his surgery.  Informed pt's SO of pt's CXR and CTS appt on 8/4.  Pt's SO instructed to call the clinic with any questions or concerns.  Pt's SO verbalized understanding to all instructions.

## 2022-08-03 ENCOUNTER — ANTI-COAG VISIT (OUTPATIENT)
Dept: CARDIOLOGY | Facility: CLINIC | Age: 37
End: 2022-08-03
Payer: MEDICAID

## 2022-08-03 ENCOUNTER — PATIENT MESSAGE (OUTPATIENT)
Dept: CARDIOLOGY | Facility: CLINIC | Age: 37
End: 2022-08-03
Payer: MEDICAID

## 2022-08-03 DIAGNOSIS — Z95.811 LVAD (LEFT VENTRICULAR ASSIST DEVICE) PRESENT: Primary | ICD-10-CM

## 2022-08-03 NOTE — PROGRESS NOTES
Spoke to patient's wife regarding enrollment into the Coumadin Clinic. Mrs. Queen verified that the patient has a 3mg warfarin tablet, and was advised on a medication regimen of 3mg daily.  Next INR scheduled for 8/4/22, at [facility], confirmed with patient.    Mrs. Queen, educated on diet and when to call. All questions and concerns addressed, and Mrs. Queen expressed understanding. Education sheets sent via patient portal to reinforce teaching.    Patient will avoid greens and ETOH. The risks associated with consuming ETOH while taking warfarin, and the importance of a consistent diet discussed.

## 2022-08-03 NOTE — PROGRESS NOTES
Kevan Queen is a 37 y.o. male s/p HM3 implantation from 6.26.22 is preparing for discharge today. During his post operative course Mr. Queen was started on keppra for EEG demonstrating temporal lob seizure activity. He is continue iv milrinone on discharge. He was enrolled in the [facility] investigational aspirin study. His inr today is 2.6. Plan to continue warfarin 3mg orally daily.

## 2022-08-03 NOTE — PROGRESS NOTES
Patient seen and examined. Patient is progressively increasing activity. No significant complaints.     Sternum: stable, incision CDI  Chest xray: Acceptable post op chest       Assessment:  S/P MVr and LVAD 6/29/22       Plan:  Sutures removed, patient tolerated well   Continue to follow up with HTS  No scheduled appointment, RTC prn

## 2022-08-04 ENCOUNTER — CLINICAL SUPPORT (OUTPATIENT)
Dept: TRANSPLANT | Facility: CLINIC | Age: 37
End: 2022-08-04
Payer: MEDICAID

## 2022-08-04 ENCOUNTER — OFFICE VISIT (OUTPATIENT)
Dept: TRANSPLANT | Facility: CLINIC | Age: 37
End: 2022-08-04
Payer: MEDICAID

## 2022-08-04 ENCOUNTER — HOSPITAL ENCOUNTER (OUTPATIENT)
Dept: RADIOLOGY | Facility: HOSPITAL | Age: 37
Discharge: HOME | End: 2022-08-04
Attending: THORACIC SURGERY (CARDIOTHORACIC VASCULAR SURGERY)
Payer: MEDICAID

## 2022-08-04 ENCOUNTER — ANTI-COAG VISIT (OUTPATIENT)
Dept: CARDIOLOGY | Facility: CLINIC | Age: 37
End: 2022-08-04
Payer: MEDICAID

## 2022-08-04 ENCOUNTER — OFFICE VISIT (OUTPATIENT)
Dept: CARDIOTHORACIC SURGERY | Facility: CLINIC | Age: 37
End: 2022-08-04
Payer: MEDICAID

## 2022-08-04 VITALS — WEIGHT: 188 LBS | HEIGHT: 76 IN | TEMPERATURE: 98 F | BODY MASS INDEX: 22.89 KG/M2 | SYSTOLIC BLOOD PRESSURE: 75 MMHG

## 2022-08-04 VITALS — SYSTOLIC BLOOD PRESSURE: 70 MMHG | OXYGEN SATURATION: 100 %

## 2022-08-04 DIAGNOSIS — Z95.811 LVAD (LEFT VENTRICULAR ASSIST DEVICE) PRESENT: ICD-10-CM

## 2022-08-04 DIAGNOSIS — Z95.811 LVAD (LEFT VENTRICULAR ASSIST DEVICE) PRESENT: Primary | ICD-10-CM

## 2022-08-04 DIAGNOSIS — I50.42 CHRONIC COMBINED SYSTOLIC AND DIASTOLIC CONGESTIVE HEART FAILURE: ICD-10-CM

## 2022-08-04 DIAGNOSIS — I50.814 RIGHT HEART FAILURE SECONDARY TO LEFT HEART FAILURE: ICD-10-CM

## 2022-08-04 DIAGNOSIS — I50.41 ACUTE COMBINED SYSTOLIC AND DIASTOLIC HEART FAILURE: Primary | ICD-10-CM

## 2022-08-04 DIAGNOSIS — G89.29 CHRONIC LEFT SHOULDER PAIN: ICD-10-CM

## 2022-08-04 DIAGNOSIS — I42.0 FAMILIAL DILATED CARDIOMYOPATHY: ICD-10-CM

## 2022-08-04 DIAGNOSIS — M25.512 CHRONIC LEFT SHOULDER PAIN: ICD-10-CM

## 2022-08-04 PROCEDURE — 71046 X-RAY EXAM CHEST 2 VIEWS: CPT | Mod: 26,,, | Performed by: RADIOLOGY

## 2022-08-04 PROCEDURE — 4010F PR ACE/ARB THEARPY RXD/TAKEN: ICD-10-PCS | Mod: CPTII,,, | Performed by: INTERNAL MEDICINE

## 2022-08-04 PROCEDURE — 71046 X-RAY EXAM CHEST 2 VIEWS: CPT | Mod: TC,FY

## 2022-08-04 PROCEDURE — 1111F DSCHRG MED/CURRENT MED MERGE: CPT | Mod: CPTII,,, | Performed by: INTERNAL MEDICINE

## 2022-08-04 PROCEDURE — 99214 OFFICE O/P EST MOD 30 MIN: CPT | Mod: S$PBB,,, | Performed by: INTERNAL MEDICINE

## 2022-08-04 PROCEDURE — 71046 XR CHEST PA AND LATERAL: ICD-10-PCS | Mod: 26,,, | Performed by: RADIOLOGY

## 2022-08-04 PROCEDURE — 3044F PR MOST RECENT HEMOGLOBIN A1C LEVEL <7.0%: ICD-10-PCS | Mod: CPTII,,, | Performed by: PHYSICIAN ASSISTANT

## 2022-08-04 PROCEDURE — 99024 PR POST-OP FOLLOW-UP VISIT: ICD-10-PCS | Mod: ,,, | Performed by: PHYSICIAN ASSISTANT

## 2022-08-04 PROCEDURE — 3066F NEPHROPATHY DOC TX: CPT | Mod: CPTII,,, | Performed by: PHYSICIAN ASSISTANT

## 2022-08-04 PROCEDURE — 3060F PR POS MICROALBUMINURIA RESULT DOCUMENTED/REVIEW: ICD-10-PCS | Mod: CPTII,,, | Performed by: PHYSICIAN ASSISTANT

## 2022-08-04 PROCEDURE — 3066F PR DOCUMENTATION OF TREATMENT FOR NEPHROPATHY: ICD-10-PCS | Mod: CPTII,,, | Performed by: PHYSICIAN ASSISTANT

## 2022-08-04 PROCEDURE — 99999 PR PBB SHADOW E&M-EST. PATIENT-LVL III: CPT | Mod: PBBFAC,,, | Performed by: INTERNAL MEDICINE

## 2022-08-04 PROCEDURE — 3008F PR BODY MASS INDEX (BMI) DOCUMENTED: ICD-10-PCS | Mod: CPTII,,, | Performed by: INTERNAL MEDICINE

## 2022-08-04 PROCEDURE — 93793 PR ANTICOAGULANT MGMT FOR PT TAKING WARFARIN: ICD-10-PCS | Mod: ,,,

## 2022-08-04 PROCEDURE — 3008F BODY MASS INDEX DOCD: CPT | Mod: CPTII,,, | Performed by: INTERNAL MEDICINE

## 2022-08-04 PROCEDURE — 99213 OFFICE O/P EST LOW 20 MIN: CPT | Mod: PBBFAC,25,27 | Performed by: INTERNAL MEDICINE

## 2022-08-04 PROCEDURE — 3066F NEPHROPATHY DOC TX: CPT | Mod: CPTII,,, | Performed by: INTERNAL MEDICINE

## 2022-08-04 PROCEDURE — 99999 PR PBB SHADOW E&M-EST. PATIENT-LVL II: CPT | Mod: PBBFAC,,, | Performed by: PHYSICIAN ASSISTANT

## 2022-08-04 PROCEDURE — 99024 POSTOP FOLLOW-UP VISIT: CPT | Mod: ,,, | Performed by: PHYSICIAN ASSISTANT

## 2022-08-04 PROCEDURE — 93793 ANTICOAG MGMT PT WARFARIN: CPT | Mod: ,,,

## 2022-08-04 PROCEDURE — 3051F PR MOST RECENT HEMOGLOBIN A1C LEVEL 7.0 - < 8.0%: ICD-10-PCS | Mod: CPTII,,, | Performed by: INTERNAL MEDICINE

## 2022-08-04 PROCEDURE — 3066F PR DOCUMENTATION OF TREATMENT FOR NEPHROPATHY: ICD-10-PCS | Mod: CPTII,,, | Performed by: INTERNAL MEDICINE

## 2022-08-04 PROCEDURE — 3044F HG A1C LEVEL LT 7.0%: CPT | Mod: CPTII,,, | Performed by: PHYSICIAN ASSISTANT

## 2022-08-04 PROCEDURE — 99212 OFFICE O/P EST SF 10 MIN: CPT | Mod: PBBFAC,25 | Performed by: PHYSICIAN ASSISTANT

## 2022-08-04 PROCEDURE — 99999 PR PBB SHADOW E&M-EST. PATIENT-LVL III: ICD-10-PCS | Mod: PBBFAC,,, | Performed by: INTERNAL MEDICINE

## 2022-08-04 PROCEDURE — 3060F PR POS MICROALBUMINURIA RESULT DOCUMENTED/REVIEW: ICD-10-PCS | Mod: CPTII,,, | Performed by: INTERNAL MEDICINE

## 2022-08-04 PROCEDURE — 99999 PR PBB SHADOW E&M-EST. PATIENT-LVL II: ICD-10-PCS | Mod: PBBFAC,,, | Performed by: PHYSICIAN ASSISTANT

## 2022-08-04 PROCEDURE — 1111F PR DISCHARGE MEDS RECONCILED W/ CURRENT OUTPATIENT MED LIST: ICD-10-PCS | Mod: CPTII,,, | Performed by: INTERNAL MEDICINE

## 2022-08-04 PROCEDURE — 4010F ACE/ARB THERAPY RXD/TAKEN: CPT | Mod: CPTII,,, | Performed by: PHYSICIAN ASSISTANT

## 2022-08-04 PROCEDURE — 4010F PR ACE/ARB THEARPY RXD/TAKEN: ICD-10-PCS | Mod: CPTII,,, | Performed by: PHYSICIAN ASSISTANT

## 2022-08-04 PROCEDURE — 3060F POS MICROALBUMINURIA REV: CPT | Mod: CPTII,,, | Performed by: INTERNAL MEDICINE

## 2022-08-04 PROCEDURE — 3060F POS MICROALBUMINURIA REV: CPT | Mod: CPTII,,, | Performed by: PHYSICIAN ASSISTANT

## 2022-08-04 PROCEDURE — 99214 PR OFFICE/OUTPT VISIT, EST, LEVL IV, 30-39 MIN: ICD-10-PCS | Mod: S$PBB,,, | Performed by: INTERNAL MEDICINE

## 2022-08-04 PROCEDURE — 4010F ACE/ARB THERAPY RXD/TAKEN: CPT | Mod: CPTII,,, | Performed by: INTERNAL MEDICINE

## 2022-08-04 PROCEDURE — 3051F HG A1C>EQUAL 7.0%<8.0%: CPT | Mod: CPTII,,, | Performed by: INTERNAL MEDICINE

## 2022-08-04 RX ORDER — TRAMADOL HYDROCHLORIDE 50 MG/1
50 TABLET ORAL EVERY 6 HOURS PRN
Qty: 28 TABLET | Refills: 0 | Status: CANCELLED | OUTPATIENT
Start: 2022-08-04

## 2022-08-04 RX ORDER — WARFARIN 3 MG/1
3 TABLET ORAL DAILY
Qty: 30 TABLET | Refills: 11 | Status: CANCELLED | OUTPATIENT
Start: 2022-08-04 | End: 2023-08-04

## 2022-08-04 RX ORDER — MILRINONE LACTATE 1 MG/ML
INJECTION INTRAVENOUS
Status: ON HOLD | COMMUNITY
Start: 2022-07-27 | End: 2022-09-06 | Stop reason: HOSPADM

## 2022-08-04 NOTE — LETTER
September 11, 2022        Inderjit Hendricks  [address]  [address]  [place] [address]  Phone: [phone]  Fax: [phone]     Josh Pulido  [address] [address]  Phone: [phone]  Fax: [phone]             [id number]  [address]  [place] [address]  Phone: [phone]   Patient: Kevan Queen   MR Number: [id number]   YOB: 1985   Date of Visit: 8/4/2022       Dear Dr. Inderjit Hendricks, Josh Pulido    Thank you for referring Kevan Queen to me for evaluation. Attached you will find relevant portions of my assessment and plan of care.    If you have questions, please do not hesitate to call me. I look forward to following Kevan Queen along with you.    Sincerely,    Dalia Crum MD    Enclosure    If you would like to receive this communication electronically, please contact [email or web address] or [phone] to request [employer] access.    [employer] is a tool which provides read-only access to select patient information with whom you have a relationship. Its easy to use and provides real time access to review your patients record including encounter summaries, notes, results, and demographic information.    If you feel you have received this communication in error or would no longer like to receive these types of communications, please e-mail [email or web address]

## 2022-08-04 NOTE — PATIENT INSTRUCTIONS
From today through August 10, please do not lift, push, or pull anything greater than 5 pounds.  From August 11 until September 21, you can lift, push, and pull up to 20 pounds.  Beginning on September 22, you will no longer have any lifting, pushing, or pulling restrictions.

## 2022-08-05 NOTE — PROGRESS NOTES
"Date of Implant with Heartmate 3 LVAD: 6/29/22    PATIENT ARRIVED IN CLINIC:  Ambulatory   Accompanied by: Robyn  Complaints/reason for visit today: routine    Vitals  Temperature, oral:   Temp Readings from Last 1 Encounters:   08/04/22 98.2 °F (36.8 °C) (Oral)     Blood Pressure:   BP Readings from Last 3 Encounters:   08/04/22 (!) 75/0   08/04/22 (!) 70/0   07/27/22 (!) 86/52        VAD Interrogation:  TXP JOY INTERROGATIONS 8/4/2022 7/27/2022 7/27/2022   Type HeartMate3 - -   Flow 3.4 - -   Speed 5100 - -   PI 4.7 - -   Power (Serna) 3.5 - -   LSL 4700 - -   Pulsatility No Pulse Intermittent pulse Intermittent pulse     HCT:   Lab Results   Component Value Date    HCT 35.8 (L) 08/04/2022    HCT 29 (L) 07/23/2022       History Log: [id number]  Problems / Issues / Alarms with VAD if any: None noted  VAD Sounds: HM3     VAD Binder With Patient: no   Reviewed VAD Numbers In Binder: no  Enrolled in Care Companion: yes    Equipment:  Emergency Equipment With Patient: yes   Any Equipment Issues: None noted   It is medically necessary to ensure patient has properly functioning equipment and wearables to prevent infection, injury or death to patient.     DLES Assessment:  Appearance Of Driveline: "1" with suture CDI. Pt was lying on his stomach one night this week.  Opted not to remove suture, will remain in place for now for protection.   Antibiotics: NO  Velour: no  Manual & Visual Inspection Of Driveline: No kinks or tears noted  Stabilization Device In Use: yes, melendez securement device    Heartmate 3 Module Cable:  No yellow exposed and Attempted to unscrew modular cable to ensure it will be able to come lose in the event we ever need to change the modular cable while patient held the driveline in place so it would not move. Modular cable connection able to be unscrewed and re-tightened. Instructed pt to perform this weekly.    Patient MyChart Questionnaire: No flowsheet data found.     Assessment:   PAIN: " NO  Complaints Of Nausea / Vomiting: None noted    Appearance and Frequency Of Stools: normal and formed without blood & daily  Color Of Urine: clear/yellow  Coping/Depression/Anxiety: coping okay  Sleep Habits: 5-6 hrs /night  Sleep Aids: None noted  Showering: No  Activity/Exercise: moving around the house   Driving: No.    DLES Dressing Care:   Frequency of Dressing Changes: daily & daily kit  Pt In Need Of Management Kits?:yes -   2 Box of daily kit  It is medically necessary to have VAD management kits in order to prevent infection or to assist in the healing of an infected DLES.    Labs:    Chemistry        Component Value Date/Time     08/04/2022 1257    K 4.8 08/04/2022 1257    CL 98 08/04/2022 1257    CO2 28 08/04/2022 1257    BUN 10 08/04/2022 1257    CREATININE 1.5 (H) 08/04/2022 1257     (H) 08/04/2022 1257        Component Value Date/Time    CALCIUM 10.1 08/04/2022 1257    ALKPHOS 170 (H) 08/04/2022 1257    AST 41 (H) 08/04/2022 1257    ALT 35 08/04/2022 1257    BILITOT 0.6 08/04/2022 1257    ESTGFRAFRICA >60.0 07/27/2022 1229    EGFRNONAA 54.2 (A) 07/27/2022 1229            Magnesium   Date Value Ref Range Status   08/04/2022 1.8 1.6 - 2.6 mg/dL Final       Lab Results   Component Value Date    WBC 10.02 08/04/2022    HGB 10.9 (L) 08/04/2022    HCT 35.8 (L) 08/04/2022    MCV 78 (L) 08/04/2022     08/04/2022       Lab Results   Component Value Date    INR 2.8 (H) 08/04/2022    INR 2.6 (H) 07/27/2022    INR 2.7 (H) 07/26/2022    PROTIME 14.6 (H) 09/23/2021    PROTIME 14.4 09/04/2021    PROTIME 11.9 10/25/2020       BNP   Date Value Ref Range Status   08/04/2022 173 (H) 0 - 99 pg/mL Final     Comment:     Values of less than 100 pg/ml are consistent with non-CHF populations.   07/27/2022 198 (H) 0 - 99 pg/mL Final     Comment:     Values of less than 100 pg/ml are consistent with non-CHF populations.   07/25/2022 337 (H) 0 - 99 pg/mL Final     Comment:     Values of less than 100 pg/ml  are consistent with non-CHF populations.       LD   Date Value Ref Range Status   08/04/2022 261 (H) 110 - 260 U/L Final     Comment:     Results are increased in hemolyzed samples.   07/27/2022 290 (H) 110 - 260 U/L Final     Comment:     Results are increased in hemolyzed samples.   07/26/2022 251 110 - 260 U/L Final     Comment:     Results are increased in hemolyzed samples.       Labs reviewed with patient: YES     Medication reconciliation: per MA.  Medication Detail updated today: yes  Coumadin Managed by: [facility]    Education: Reviewed driveline care, emergency procedures, how to change the controller, alarms with patient, as well as discussed how to page the VAD coordinator in case of an emergency.   Covid - 19 education: Reminded patient/caregiver to check temperature daily and call us if it is > 99.0.  Reminded them  to stay 6 feet away from other people, wash hands frequently, don't touch your face and stay home except to get labs, medications, and appts.    Covid Vaccine: Pt informed that we are encouraging all VAD patients to receive the COVID vaccine.  Informed pt that they can take tylenol but should avoid other NSAIDs.      Plans/Needs: Pt had seizures last night and today per him and Robyn.  They report he feels them coming on, he is still taking his Kepra and has not fallen or hit his head.  He has a neuro appt later this month.  I called department to see if he could be seen today, but no appts available.   Discussed with Dr. Crum, no changes to be made.    Cardiac rehab referral placed today.  Printed slip and will need to be handed to pt next week.  Primacor infusing at 0.25 mcg/kg/min via MELISSA PICC.  Danica CDI.  Refer to MD note.    Hurricane Season: Yes, discussed with patient: With hurricane season approaching, we want to make sure you are fully prepared for any emergency.  Should the National Weather Service or your local authorities recommend a voluntary or mandatory  evacuation of your area, The VAD team requires you to evacuate to a safe place.  Remember, when it is a mandatory evacuation, traffic will become an issue for your limited battery power.  Therefore, we strongly urge you to evacuate early.    The VAD team advises you to have the following in place before hurricane season:  Have an evacuation plan in place including places to evacuate, names and phone numbers.  This information is required to be given to the VAD coordinator.   1. Have your VAD emergency contact numbers with you.   2. Make sure your prescriptions will not run out by the end of September.   3. Make sure you have enough medications, including pills, inhalers, patches, etc. to take, should you be gone for more than 2 weeks.   4. Make sure ALL of your batteries are fully charged.   5. Bring enough dressing change supplies to last for at least 2 weeks.   6. Bring your VAD binder with you.  Make sure your binder is updated and complete with alarms reference card, patient hand book, emergency contact numbers, daily log sheets, etc.  If you do not have family or friends as an evacuation destination, we recommend evacuating to a safe area.   Do NOT evacuate to [facility].    The VAD team wants to stress the importance of planning for your evacuation in the event of a hurricane.  If you have any LVAD questions or issues, please contact the LVAD coordinator.

## 2022-08-12 ENCOUNTER — PATIENT MESSAGE (OUTPATIENT)
Dept: CARDIOLOGY | Facility: CLINIC | Age: 37
End: 2022-08-12
Payer: MEDICAID

## 2022-08-12 NOTE — PROGRESS NOTES
Patient did not have scheduled INR labs drawn on 8/10/22, and no showed for his  LVAD appointments on 8/11/22. Unable to reach patient by phone,and a portal message was sent in an effort to contact patient.    Update - spoke to patient regarding missed INR. Missed 8/10/22 INR lab rescheduled to 8/15/22, per patient.

## 2022-08-15 ENCOUNTER — ANTI-COAG VISIT (OUTPATIENT)
Dept: CARDIOLOGY | Facility: CLINIC | Age: 37
End: 2022-08-15
Payer: MEDICAID

## 2022-08-15 ENCOUNTER — LAB VISIT (OUTPATIENT)
Dept: LAB | Facility: HOSPITAL | Age: 37
End: 2022-08-15
Attending: INTERNAL MEDICINE
Payer: MEDICAID

## 2022-08-15 DIAGNOSIS — Z95.811 LVAD (LEFT VENTRICULAR ASSIST DEVICE) PRESENT: ICD-10-CM

## 2022-08-15 DIAGNOSIS — Z95.811 LVAD (LEFT VENTRICULAR ASSIST DEVICE) PRESENT: Primary | ICD-10-CM

## 2022-08-15 LAB
INR BLD: 1.8 (ref 0–1.3)
PROTHROMBIN TIME: 20.6 SECONDS (ref 12.5–14.5)

## 2022-08-15 PROCEDURE — 36415 COLL VENOUS BLD VENIPUNCTURE: CPT

## 2022-08-15 PROCEDURE — 85610 PROTHROMBIN TIME: CPT

## 2022-08-15 PROCEDURE — 93793 ANTICOAG MGMT PT WARFARIN: CPT | Mod: ,,,

## 2022-08-15 PROCEDURE — 93793 PR ANTICOAGULANT MGMT FOR PT TAKING WARFARIN: ICD-10-PCS | Mod: ,,,

## 2022-08-18 ENCOUNTER — OFFICE VISIT (OUTPATIENT)
Dept: TRANSPLANT | Facility: CLINIC | Age: 37
End: 2022-08-18
Payer: MEDICAID

## 2022-08-18 ENCOUNTER — ANTI-COAG VISIT (OUTPATIENT)
Dept: CARDIOLOGY | Facility: CLINIC | Age: 37
End: 2022-08-18
Payer: MEDICAID

## 2022-08-18 ENCOUNTER — CLINICAL SUPPORT (OUTPATIENT)
Dept: TRANSPLANT | Facility: CLINIC | Age: 37
End: 2022-08-18
Payer: MEDICAID

## 2022-08-18 ENCOUNTER — LAB VISIT (OUTPATIENT)
Dept: LAB | Facility: HOSPITAL | Age: 37
End: 2022-08-18
Attending: INTERNAL MEDICINE
Payer: MEDICAID

## 2022-08-18 VITALS — SYSTOLIC BLOOD PRESSURE: 78 MMHG | BODY MASS INDEX: 22.99 KG/M2 | HEIGHT: 76 IN | WEIGHT: 188.81 LBS | TEMPERATURE: 98 F

## 2022-08-18 DIAGNOSIS — R56.9 SEIZURE-LIKE ACTIVITY: ICD-10-CM

## 2022-08-18 DIAGNOSIS — Z95.811 LVAD (LEFT VENTRICULAR ASSIST DEVICE) PRESENT: Primary | ICD-10-CM

## 2022-08-18 DIAGNOSIS — I42.0 DILATED CARDIOMYOPATHY: ICD-10-CM

## 2022-08-18 DIAGNOSIS — I50.42 CHRONIC COMBINED SYSTOLIC AND DIASTOLIC CONGESTIVE HEART FAILURE: ICD-10-CM

## 2022-08-18 DIAGNOSIS — N18.31 STAGE 3A CHRONIC KIDNEY DISEASE: ICD-10-CM

## 2022-08-18 DIAGNOSIS — Z95.811 LVAD (LEFT VENTRICULAR ASSIST DEVICE) PRESENT: ICD-10-CM

## 2022-08-18 DIAGNOSIS — E08.641 DIABETES MELLITUS DUE TO UNDERLYING CONDITION, UNCONTROLLED, WITH HYPOGLYCEMIA AND COMA: ICD-10-CM

## 2022-08-18 DIAGNOSIS — Z72.0 TOBACCO USE: ICD-10-CM

## 2022-08-18 PROBLEM — D72.829 LEUKOCYTOSIS: Status: RESOLVED | Noted: 2022-07-20 | Resolved: 2022-08-18

## 2022-08-18 PROBLEM — I50.43 ACUTE ON CHRONIC COMBINED SYSTOLIC AND DIASTOLIC HEART FAILURE, NYHA CLASS 4: Status: RESOLVED | Noted: 2020-10-25 | Resolved: 2022-08-18

## 2022-08-18 PROBLEM — I42.8 NICM (NONISCHEMIC CARDIOMYOPATHY): Status: RESOLVED | Noted: 2020-10-26 | Resolved: 2022-08-18

## 2022-08-18 LAB
ALBUMIN SERPL BCP-MCNC: 3.9 G/DL (ref 3.5–5.2)
ALP SERPL-CCNC: 175 U/L (ref 55–135)
ALT SERPL W/O P-5'-P-CCNC: 43 U/L (ref 10–44)
ANION GAP SERPL CALC-SCNC: 11 MMOL/L (ref 8–16)
AST SERPL-CCNC: 72 U/L (ref 10–40)
BASOPHILS # BLD AUTO: 0.05 K/UL (ref 0–0.2)
BASOPHILS NFR BLD: 0.7 % (ref 0–1.9)
BILIRUB DIRECT SERPL-MCNC: 0.3 MG/DL (ref 0.1–0.3)
BILIRUB SERPL-MCNC: 0.6 MG/DL (ref 0.1–1)
BNP SERPL-MCNC: 80 PG/ML (ref 0–99)
BUN SERPL-MCNC: 10 MG/DL (ref 6–20)
CALCIUM SERPL-MCNC: 10.7 MG/DL (ref 8.7–10.5)
CHLORIDE SERPL-SCNC: 98 MMOL/L (ref 95–110)
CO2 SERPL-SCNC: 24 MMOL/L (ref 23–29)
CREAT SERPL-MCNC: 1.5 MG/DL (ref 0.5–1.4)
CRP SERPL-MCNC: 2.8 MG/L (ref 0–8.2)
DIFFERENTIAL METHOD: ABNORMAL
EOSINOPHIL # BLD AUTO: 0.2 K/UL (ref 0–0.5)
EOSINOPHIL NFR BLD: 2.6 % (ref 0–8)
ERYTHROCYTE [DISTWIDTH] IN BLOOD BY AUTOMATED COUNT: 21.4 % (ref 11.5–14.5)
EST. GFR  (NO RACE VARIABLE): >60 ML/MIN/1.73 M^2
GLUCOSE SERPL-MCNC: 218 MG/DL (ref 70–110)
HCT VFR BLD AUTO: 40.5 % (ref 40–54)
HGB BLD-MCNC: 12.8 G/DL (ref 14–18)
IMM GRANULOCYTES # BLD AUTO: 0.01 K/UL (ref 0–0.04)
IMM GRANULOCYTES NFR BLD AUTO: 0.1 % (ref 0–0.5)
INR PPP: 2.8 (ref 0.8–1.2)
LDH SERPL L TO P-CCNC: 314 U/L (ref 110–260)
LYMPHOCYTES # BLD AUTO: 2 K/UL (ref 1–4.8)
LYMPHOCYTES NFR BLD: 27 % (ref 18–48)
MAGNESIUM SERPL-MCNC: 2 MG/DL (ref 1.6–2.6)
MCH RBC QN AUTO: 25.8 PG (ref 27–31)
MCHC RBC AUTO-ENTMCNC: 31.6 G/DL (ref 32–36)
MCV RBC AUTO: 82 FL (ref 82–98)
MONOCYTES # BLD AUTO: 0.8 K/UL (ref 0.3–1)
MONOCYTES NFR BLD: 10.6 % (ref 4–15)
NEUTROPHILS # BLD AUTO: 4.3 K/UL (ref 1.8–7.7)
NEUTROPHILS NFR BLD: 59 % (ref 38–73)
NRBC BLD-RTO: 0 /100 WBC
PHOSPHATE SERPL-MCNC: 3.7 MG/DL (ref 2.7–4.5)
PLATELET # BLD AUTO: 432 K/UL (ref 150–450)
PMV BLD AUTO: 8.6 FL (ref 9.2–12.9)
POTASSIUM SERPL-SCNC: 4.3 MMOL/L (ref 3.5–5.1)
PREALB SERPL-MCNC: 30 MG/DL (ref 20–43)
PROT SERPL-MCNC: 9.1 G/DL (ref 6–8.4)
PROTHROMBIN TIME: 27 SEC (ref 9–12.5)
RBC # BLD AUTO: 4.97 M/UL (ref 4.6–6.2)
SODIUM SERPL-SCNC: 133 MMOL/L (ref 136–145)
WBC # BLD AUTO: 7.34 K/UL (ref 3.9–12.7)

## 2022-08-18 PROCEDURE — 80053 COMPREHEN METABOLIC PANEL: CPT | Performed by: INTERNAL MEDICINE

## 2022-08-18 PROCEDURE — 84134 ASSAY OF PREALBUMIN: CPT | Performed by: INTERNAL MEDICINE

## 2022-08-18 PROCEDURE — 3008F BODY MASS INDEX DOCD: CPT | Mod: CPTII,,, | Performed by: INTERNAL MEDICINE

## 2022-08-18 PROCEDURE — 3066F NEPHROPATHY DOC TX: CPT | Mod: CPTII,,, | Performed by: INTERNAL MEDICINE

## 2022-08-18 PROCEDURE — 93793 ANTICOAG MGMT PT WARFARIN: CPT | Mod: ,,,

## 2022-08-18 PROCEDURE — 85025 COMPLETE CBC W/AUTO DIFF WBC: CPT | Performed by: INTERNAL MEDICINE

## 2022-08-18 PROCEDURE — 3066F PR DOCUMENTATION OF TREATMENT FOR NEPHROPATHY: ICD-10-PCS | Mod: CPTII,,, | Performed by: INTERNAL MEDICINE

## 2022-08-18 PROCEDURE — 1159F MED LIST DOCD IN RCRD: CPT | Mod: CPTII,,, | Performed by: INTERNAL MEDICINE

## 2022-08-18 PROCEDURE — 4010F ACE/ARB THERAPY RXD/TAKEN: CPT | Mod: CPTII,,, | Performed by: INTERNAL MEDICINE

## 2022-08-18 PROCEDURE — 93793 PR ANTICOAGULANT MGMT FOR PT TAKING WARFARIN: ICD-10-PCS | Mod: ,,,

## 2022-08-18 PROCEDURE — 1111F DSCHRG MED/CURRENT MED MERGE: CPT | Mod: CPTII,,, | Performed by: INTERNAL MEDICINE

## 2022-08-18 PROCEDURE — 83880 ASSAY OF NATRIURETIC PEPTIDE: CPT | Performed by: INTERNAL MEDICINE

## 2022-08-18 PROCEDURE — 3044F HG A1C LEVEL LT 7.0%: CPT | Mod: CPTII,,, | Performed by: INTERNAL MEDICINE

## 2022-08-18 PROCEDURE — 3060F POS MICROALBUMINURIA REV: CPT | Mod: CPTII,,, | Performed by: INTERNAL MEDICINE

## 2022-08-18 PROCEDURE — 84100 ASSAY OF PHOSPHORUS: CPT | Performed by: INTERNAL MEDICINE

## 2022-08-18 PROCEDURE — 99214 PR OFFICE/OUTPT VISIT, EST, LEVL IV, 30-39 MIN: ICD-10-PCS | Mod: S$PBB,,, | Performed by: INTERNAL MEDICINE

## 2022-08-18 PROCEDURE — 1159F PR MEDICATION LIST DOCUMENTED IN MEDICAL RECORD: ICD-10-PCS | Mod: CPTII,,, | Performed by: INTERNAL MEDICINE

## 2022-08-18 PROCEDURE — 99214 OFFICE O/P EST MOD 30 MIN: CPT | Mod: S$PBB,,, | Performed by: INTERNAL MEDICINE

## 2022-08-18 PROCEDURE — 36415 COLL VENOUS BLD VENIPUNCTURE: CPT | Performed by: INTERNAL MEDICINE

## 2022-08-18 PROCEDURE — 99215 OFFICE O/P EST HI 40 MIN: CPT | Mod: PBBFAC | Performed by: INTERNAL MEDICINE

## 2022-08-18 PROCEDURE — 83735 ASSAY OF MAGNESIUM: CPT | Performed by: INTERNAL MEDICINE

## 2022-08-18 PROCEDURE — 99999 PR PBB SHADOW E&M-EST. PATIENT-LVL V: ICD-10-PCS | Mod: PBBFAC,,, | Performed by: INTERNAL MEDICINE

## 2022-08-18 PROCEDURE — 1111F PR DISCHARGE MEDS RECONCILED W/ CURRENT OUTPATIENT MED LIST: ICD-10-PCS | Mod: CPTII,,, | Performed by: INTERNAL MEDICINE

## 2022-08-18 PROCEDURE — 3060F PR POS MICROALBUMINURIA RESULT DOCUMENTED/REVIEW: ICD-10-PCS | Mod: CPTII,,, | Performed by: INTERNAL MEDICINE

## 2022-08-18 PROCEDURE — 3008F PR BODY MASS INDEX (BMI) DOCUMENTED: ICD-10-PCS | Mod: CPTII,,, | Performed by: INTERNAL MEDICINE

## 2022-08-18 PROCEDURE — 3044F PR MOST RECENT HEMOGLOBIN A1C LEVEL <7.0%: ICD-10-PCS | Mod: CPTII,,, | Performed by: INTERNAL MEDICINE

## 2022-08-18 PROCEDURE — 82248 BILIRUBIN DIRECT: CPT | Performed by: INTERNAL MEDICINE

## 2022-08-18 PROCEDURE — 4010F PR ACE/ARB THEARPY RXD/TAKEN: ICD-10-PCS | Mod: CPTII,,, | Performed by: INTERNAL MEDICINE

## 2022-08-18 PROCEDURE — 99999 PR PBB SHADOW E&M-EST. PATIENT-LVL V: CPT | Mod: PBBFAC,,, | Performed by: INTERNAL MEDICINE

## 2022-08-18 PROCEDURE — 86140 C-REACTIVE PROTEIN: CPT | Performed by: INTERNAL MEDICINE

## 2022-08-18 PROCEDURE — 83615 LACTATE (LD) (LDH) ENZYME: CPT | Performed by: INTERNAL MEDICINE

## 2022-08-18 PROCEDURE — 85610 PROTHROMBIN TIME: CPT | Performed by: INTERNAL MEDICINE

## 2022-08-18 RX ORDER — FUROSEMIDE 80 MG/1
80 TABLET ORAL DAILY
Qty: 30 TABLET | Refills: 11 | Status: SHIPPED | OUTPATIENT
Start: 2022-08-18 | End: 2023-10-24 | Stop reason: SDUPTHER

## 2022-08-18 NOTE — PROGRESS NOTES
"Date of Implant with Heartmate 3 LVAD: 6/29/22    PATIENT ARRIVED IN CLINIC:  Wheelchair  Accompanied by: Wife and children  Complaints/reason for visit today: routine    Vitals  Temperature, oral:   Temp Readings from Last 1 Encounters:   08/18/22 98.4 °F (36.9 °C)     Blood Pressure:   BP Readings from Last 3 Encounters:   08/18/22 (!) 78/0   08/04/22 (!) 75/0   08/04/22 (!) 70/0        VAD Interrogation:  TXP JOY INTERROGATIONS 8/18/2022 8/4/2022 7/27/2022   Type HeartMate3 HeartMate3 -   Flow 3.4 3.4 -   Speed 5100 5100 -   PI 8.2 4.7 -   Power (Serna) 3.8 3.5 -   LSL 4700 4700 -   Pulsatility Intermittent pulse No Pulse Intermittent pulse     HCT:   Lab Results   Component Value Date    HCT 40.5 08/18/2022    HCT 29 (L) 07/23/2022       History Log: [id number]  Problems / Issues / Alarms with VAD if any: 8/17/22 LFA x 24 seconds.  8/14/22 LFA x zero seconds  VAD Sounds: HM3     VAD Binder With Patient: no   Reviewed VAD Numbers In Binder: no  Enrolled in Care Companion: yes    Equipment:  Emergency Equipment With Patient: yes   Any Equipment Issues: None noted   It is medically necessary to ensure patient has properly functioning equipment and wearables to prevent infection, injury or death to patient.     DLES Assessment:  Appearance Of Driveline: "1" with suture   Antibiotics: NO  Velour: no  Manual & Visual Inspection Of Driveline: No kinks or tears noted  Stabilization Device In Use: yes, melendez securement device    Heartmate 3 Module Cable:  No yellow exposed and Attempted to unscrew modular cable to ensure it will be able to come lose in the event we ever need to change the modular cable while patient held the driveline in place so it would not move. Modular cable connection able to be unscrewed and re-tightened. Instructed pt to perform this weekly.    Patient MyChart Questionnaire: No flowsheet data found.     Assessment:   PAIN: NO  Complaints Of Nausea / Vomiting: None noted    Appearance and " Frequency Of Stools: normal and formed without blood & daily  Color Of Urine: clear/yellow  Coping/Depression/Anxiety: coping okay and anxious  Showering: No  Activity/Exercise: none   Driving: No.    DLES Dressing Care:   Frequency of Dressing Changes: daily & daily kit  Pt In Need Of Management Kits?:no   It is medically necessary to have VAD management kits in order to prevent infection or to assist in the healing of an infected DLES.    Labs:    Chemistry        Component Value Date/Time     (L) 08/18/2022 1241    K 4.3 08/18/2022 1241    CL 98 08/18/2022 1241    CO2 24 08/18/2022 1241    BUN 10 08/18/2022 1241    CREATININE 1.5 (H) 08/18/2022 1241     (H) 08/18/2022 1241        Component Value Date/Time    CALCIUM 10.7 (H) 08/18/2022 1241    ALKPHOS 175 (H) 08/18/2022 1241    AST 72 (H) 08/18/2022 1241    ALT 43 08/18/2022 1241    BILITOT 0.6 08/18/2022 1241    ESTGFRAFRICA >60.0 07/27/2022 1229    EGFRNONAA 54.2 (A) 07/27/2022 1229            Magnesium   Date Value Ref Range Status   08/18/2022 2.0 1.6 - 2.6 mg/dL Final       Lab Results   Component Value Date    WBC 7.34 08/18/2022    HGB 12.8 (L) 08/18/2022    HCT 40.5 08/18/2022    MCV 82 08/18/2022     08/18/2022       Lab Results   Component Value Date    INR 2.8 (H) 08/18/2022    INR 1.80 (H) 08/15/2022    INR 2.8 (H) 08/04/2022    PROTIME 20.6 (H) 08/15/2022    PROTIME 14.6 (H) 09/23/2021    PROTIME 14.4 09/04/2021       BNP   Date Value Ref Range Status   08/18/2022 80 0 - 99 pg/mL Final     Comment:     Values of less than 100 pg/ml are consistent with non-CHF populations.   08/04/2022 173 (H) 0 - 99 pg/mL Final     Comment:     Values of less than 100 pg/ml are consistent with non-CHF populations.   07/27/2022 198 (H) 0 - 99 pg/mL Final     Comment:     Values of less than 100 pg/ml are consistent with non-CHF populations.       LD   Date Value Ref Range Status   08/18/2022 314 (H) 110 - 260 U/L Final     Comment:     Results are  increased in hemolyzed samples.   08/04/2022 261 (H) 110 - 260 U/L Final     Comment:     Results are increased in hemolyzed samples.   07/27/2022 290 (H) 110 - 260 U/L Final     Comment:     Results are increased in hemolyzed samples.       Labs reviewed with patient: YES     Medication reconciliation: per MA.  Medication Detail updated today: no  Coumadin Managed by: [facility]    Education: Reviewed driveline care, emergency procedures, how to change the controller, alarms with patient, as well as discussed how to page the VAD coordinator in case of an emergency.   Covid - 19 education: Reminded patient/caregiver to check temperature daily and call us if it is > 99.0.  Reminded them  to stay 6 feet away from other people, wash hands frequently, don't touch your face and stay home except to get labs, medications, and appts.    Covid Vaccine: Pt informed that we are encouraging all VAD patients to receive the COVID vaccine.  Informed pt that they can take tylenol but should avoid other NSAIDs.      Plans/Needs: Primacor infusing via MELISSA PICC with blane [place].  Pt continuing to have seizures.  He and his verbalized his last sz was last Thursday, 1 week ago, but after seeing us 2 weeks ago, the sz increased.  He has not fallen or hit his head.  Pt and wife also reports he feels like a sz is coming on often.  He has a neuro appt Monday and reports he is taking Kepra as ordered.     Hurricane Season: Yes, discussed with patient: With hurricane season approaching, we want to make sure you are fully prepared for any emergency.  Should the National Weather Service or your local authorities recommend a voluntary or mandatory evacuation of your area, The VAD team requires you to evacuate to a safe place.  Remember, when it is a mandatory evacuation, traffic will become an issue for your limited battery power.  Therefore, we strongly urge you to evacuate early.      The VAD team advises you to have the following in  place before hurricane season:  Have an evacuation plan in place including places to evacuate, names and phone numbers.  This information is required to be given to the VAD coordinator.  1. Have your VAD emergency contact numbers with you.  2. Make sure your prescriptions will not run out by the end of September.  3. Make sure you have enough medications, including pills, inhalers, patches,     etc. to take, should you be gone for more than 2 weeks.  4. Make sure ALL of your batteries are fully charged.  5. Bring enough dressing change supplies to last for at least 2 weeks.  6. Bring your VAD binder with you.  Make sure your binder is updated and complete with alarms reference card, patient hand book, emergency contact numbers, daily log sheets, etc.    If you do not have family or friends as an evacuation destination, we recommend evacuating to a safe area.   Do NOT evacuate to [facility].  The VAD team wants to stress the importance of planning for your evacuation in the event of a hurricane.  If you have any LVAD questions or issues, please contact the LVAD coordinator.

## 2022-08-18 NOTE — PATIENT INSTRUCTIONS
You have low fluid levels (dehydrated).  Please adhere to a low sodium diet (no more than 1.5 grams of sodium in 24h).  3.   Follow fluid restriction of  1. no more than 2 liters in 24 hours..   4.  Please decrease FUROSEMIDE from 80 mg twice a day to 80 mg daily.  5. Please monitor your weight and call us if you are gaining weight rapidly or notice fluid retention.  6. Follow up with Neurology on Monday for problem with seizures.  7. I am ordering nicotine test / drug screen today.

## 2022-08-18 NOTE — PROGRESS NOTES
TXP JOY INTERROGATIONS 8/18/2022 8/4/2022 7/27/2022 7/27/2022 7/27/2022 7/27/2022 7/27/2022   Type HeartMate3 HeartMate3 - - - - -   Flow 3.4 3.4 - - - - -   Speed 5100 5100 - - - - -   PI 8.2 4.7 - - - - -   Power (Serna) 3.8 3.5 - - - - -   LSL 4700 4700 - - - - -   Pulsatility Intermittent pulse No Pulse Intermittent pulse Intermittent pulse Intermittent pulse Intermittent pulse Intermittent pulse   }

## 2022-08-18 NOTE — PROGRESS NOTES
Advanced Heart Failure and Transplantation Clinic Follow up.        Attending Physician: Josh Pulido MD.  The patient's last visit with me was on 4/29/2022.         HPI.  36 yo male with NIDCM with BiV systolic heart failure, on home Milrinone at 0.375 mcg/kg/min, presented at [facility] 4/2/22 ,was not evaluated for OHT as he has recently quit smoking in April 2022 but was approved for VAD with plan to begin OHT evaluation in upcoming months if Mr Queen is stable and suitable for OHT eval (blood group A).    He had HMIII placed on 6/29 by Dr Paige. 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 He also completed a course of IV Abx for staph epi. He was weaned off  but he had to restarted due to RVF. He was eventually transitioned to milrinone(secondary to shortage) which he tolerated well. He was discharged home once medically ready and will f/u in clininc in ~1 week with labs prior.     Today he comes in good spirit. He says he feels well. However, till 1 week ago, he was having seizures almost every day. TLOC along with generalized tonic-clonic seizures. All witnessed by his girlfriend who was with him today. He can feel premonitory symptoms and he has to sit. Few times, however, he did not have a warning and he felt. Last episode 1 week ago. He has f/u with Neurology next Monday.    Review of Systems   Constitutional: Negative for activity change, appetite change, chills, diaphoresis and fever.   HENT: Negative for nasal congestion, rhinorrhea and sore throat.    Eyes: Negative for visual disturbance.   Respiratory: Negative for cough, choking, chest tightness and shortness of breath.    Cardiovascular: Negative for chest pain and leg swelling.   Gastrointestinal: Negative for abdominal distention, abdominal pain, diarrhea, nausea and vomiting.   Genitourinary: Negative for  difficulty urinating, dysuria and hematuria.   Integumentary:  Negative for rash.   Neurological: Positive for seizures. Negative for syncope and light-headedness.   Psychiatric/Behavioral: Negative for agitation and hallucinations.        Past Medical History:   Diagnosis Date    Arthritis     Cardiomyopathy     CHF (congestive heart failure) 10/01/2020    Diabetes mellitus     Dilated cardiomyopathy 10/26/2020    Drug abuse 10/2020    Hyperosmolar hyperglycemic state (HHS) 5/25/2022    ICD (implantable cardioverter-defibrillator) in place 10/26/2020    Muscle cramping 6/15/2022    Renal disorder         Past Surgical History:   Procedure Laterality Date    APPLICATION OF WOUND VACUUM-ASSISTED CLOSURE DEVICE N/A 6/30/2022    Procedure: APPLICATION, WOUND VAC;  Surgeon: Luis F Paige MD;  Location: [facility] OR [address] [place] [address];  Service: Cardiovascular;  Laterality: N/A;  50 x 5 cm    CARDIAC DEFIBRILLATOR PLACEMENT      IMPLANTATION OF RIGHT VENTRICULAR ASSIST DEVICE (RVAD) N/A 6/29/2022    Procedure: INSERTION, RVAD;  Surgeon: Luis F Paige MD;  [facility];  Service: Cardiovascular;  Laterality: N/A;    INSERTION OF GRAFT TO PERICARDIUM Right 6/30/2022    Procedure: INSERTION-RIGHT VENTRICULAR ASSIST DEVICE;  Surgeon: Luis F Paige MD;  [facility];  Service: Cardiovascular;  Laterality: Right;    IRRIGATION OF MEDIASTINUM  6/30/2022    Procedure: IRRIGATION, MEDIASTINUM;  Surgeon: Luis F Paige MD;  Location: [facility] OR [address] [place] [address];  Service: Cardiovascular;;    LEFT VENTRICULAR ASSIST DEVICE Left 6/23/2022    Procedure: INSERTION-LEFT VENTRICULAR ASSIST DEVICE;  Surgeon: Luis F Paige MD;  [facility];  Service: Cardiovascular;  Laterality: Left;    LEFT VENTRICULAR ASSIST DEVICE N/A 6/29/2022    Procedure: INSERTION-LEFT VENTRICULAR ASSIST DEVICE;  Surgeon: Luis F Paige MD;  Location: [facility] OR [address] [place] [address];  Service: Cardiovascular;  Laterality: N/A;    RIGHT HEART  CATHETERIZATION Right 4/8/2022    Procedure: INSERTION, CATHETER, RIGHT HEART;  Surgeon: Luca Lopez Jr., MD;  Location: [facility] CATH LAB;  Service: Cardiology;  Laterality: Right;    RIGHT HEART CATHETERIZATION Right 4/19/2022    Procedure: INSERTION, CATHETER, RIGHT HEART;  Surgeon: Josh Pulido MD;  Location: [facility] CATH LAB;  Service: Cardiology;  Laterality: Right;    RIGHT HEART CATHETERIZATION Right 7/21/2022    Procedure: INSERTION, CATHETER, RIGHT HEART;  Surgeon: Dalia Crum MD;  Location: [facility] CATH LAB;  Service: Cardiology;  Laterality: Right;    STERNAL WOUND CLOSURE N/A 6/30/2022    Procedure: CLOSURE, WOUND, STERNUM;  Surgeon: Luis F Paige MD;  Location: [facility] OR [address] [place] [address];  Service: Cardiovascular;  Laterality: N/A;        Family History   Problem Relation Age of Onset    Heart failure Father     Diverticulosis Brother     Heart attack Maternal Grandmother     Heart attack Maternal Grandfather         Review of patient's allergies indicates:   Allergen Reactions    Aspirin Other (See Comments)     Mr. Thacker is enrolled in Dr. Paige's Alon Trial and cannot have any aspirin and/or aspirin-containing products. DO NOT cancel any orders for INV Aspirin 100 mg/Placebo. If you have questions, please contact Isabel @ [phone], [phone],[email or web address], secure chat or MS Teams message.    Bumex [bumetanide] Hives    Lactose Diarrhea     Other reaction(s): Abdominal distension, gaseous    Torsemide Hives        Current Outpatient Medications   Medication Instructions    albuterol (PROVENTIL/VENTOLIN HFA) 90 mcg/actuation inhaler INHALE 2 PUFFS BY MOUTH EVERY 4 HOURS AS NEEDED FOR COUGH OR WHEEZING    blood sugar diagnostic Strp Use to test blood glucose 4 (four) times daily.    blood-glucose meter Misc Use as instructed    busPIRone (BUSPAR) 7.5 mg, Oral, Daily    famotidine (PEPCID) 40 mg, Oral, Daily    ferrous gluconate 324 mg, Oral, With breakfast     "furosemide (LASIX) 80 mg, Oral, 2 times daily    gabapentin (NEURONTIN) 100 mg, Oral, 3 times daily    insulin aspart U-100 (NOVOLOG) 10 Units, Subcutaneous, 3 times daily    insulin detemir U-100 (LEVEMIR FLEXTOUCH) 22 Units, Subcutaneous, Daily    INV ASPIRIN 100MG/PLACEBO Take 1 capsul (100 ) mg by mouth once daily. FOR INVESTIGATIONAL USE ONLY. Protocol: GASTON III subject: [id number]    lancets 33 gauge Misc Use to test blood glucose 4 (four) times daily.    levETIRAcetam (KEPPRA) 1,000 mg, Oral, 2 times daily    magnesium oxide (MAG-OX) 800 mg, Oral, 2 times daily    milrinone (PRIMACOR) 1 mg/mL injection Intravenous    milrinone 20mg/100ml D5W, 200mcg/ml, (PRIMACOR) 20 mg/100 mL (200 mcg/mL) infusion 0.375 mcg/kg/min, Intravenous, Continuous    mirtazapine (REMERON) 15 mg, Oral, Nightly    omega-3 acid ethyl esters (LOVAZA) 2 g, Oral, 2 times daily    pen needle, diabetic 31 gauge x 1/4" Ndle 1 Device, Misc.(Non-Drug; Combo Route), 4 times daily    potassium chloride SA (K-DUR,KLOR-CON) 20 MEQ tablet 40 mEq, Oral, 3 times daily    senna-docusate 8.6-50 mg (SENNA-S) 8.6-50 mg per tablet 2 tablets, Oral, Daily    spironolactone (ALDACTONE) 25 mg, Oral, Daily    tiZANidine (ZANAFLEX) 2 mg, Oral, Every 8 hours PRN    traMADoL (ULTRAM) 50 mg, Oral, Every 6 hours PRN    warfarin (COUMADIN) 3 mg, Oral, Daily        There were no vitals filed for this visit.     Wt Readings from Last 3 Encounters:   08/04/22 85.3 kg (188 lb)   07/26/22 83.5 kg (184 lb 1.4 oz)   06/10/22 96.6 kg (213 lb)     Temp Readings from Last 3 Encounters:   08/04/22 98.2 °F (36.8 °C) (Oral)   07/27/22 98.3 °F (36.8 °C) (Oral)   06/10/22 98.4 °F (36.9 °C)     BP Readings from Last 3 Encounters:   08/04/22 (!) 75/0   08/04/22 (!) 70/0   07/27/22 (!) 86/52     Pulse Readings from Last 3 Encounters:   07/28/22 103   06/10/22 75   05/31/22 109        There is no height or weight on file to calculate BMI. Estimated body surface area " "is 2.13 meters squared as calculated from the following:    Height as of 8/4/22: 6' 3.5" (1.918 m).    Weight as of 8/4/22: 85.3 kg (188 lb).     Physical Exam  Constitutional:       Appearance: He is well-developed.   HENT:      Head: Normocephalic and atraumatic.      Right Ear: External ear normal.      Left Ear: External ear normal.   Eyes:      Conjunctiva/sclera: Conjunctivae normal.      Pupils: Pupils are equal, round, and reactive to light.   Neck:      Vascular: No hepatojugular reflux or JVD.      Comments: JVP 6 cmH20  Cardiovascular:      Rate and Rhythm: Normal rate and regular rhythm.      Pulses: Intact distal pulses.      Heart sounds: No murmur heard.    No friction rub. No gallop.      Comments: Normal LVAD hum  Pulmonary:      Effort: Pulmonary effort is normal.      Breath sounds: Normal breath sounds.   Abdominal:      General: Bowel sounds are normal. There is no distension.      Palpations: Abdomen is soft.      Tenderness: There is no abdominal tenderness. There is no guarding or rebound.   Musculoskeletal:      Cervical back: Normal range of motion and neck supple.      Right lower leg: No edema.      Left lower leg: No edema.   Neurological:      Mental Status: He is alert and oriented to person, place, and time.          Lab Results   Component Value Date     (H) 08/04/2022     08/04/2022    K 4.8 08/04/2022    MG 1.8 08/04/2022    CL 98 08/04/2022    CO2 28 08/04/2022    BUN 10 08/04/2022    CREATININE 1.5 (H) 08/04/2022     (H) 08/04/2022    HGBA1C 9.2 (H) 05/20/2022    HGBA1C 6.2 (H) 04/18/2022    AST 41 (H) 08/04/2022    ALT 35 08/04/2022    ALBUMIN 3.5 08/04/2022    PROT 8.9 (H) 08/04/2022    BILITOT 0.6 08/04/2022    WBC 7.34 08/18/2022    HGB 12.8 (L) 08/18/2022    HCT 40.5 08/18/2022    HCT 29 (L) 07/23/2022     08/18/2022    INR 2.8 (H) 08/18/2022     (H) 08/04/2022    TSH 2.080 04/19/2022    CHOL 261 (H) 04/18/2022    HDL 54 04/18/2022    " LDLCALC Invalid, Trig>400.0 04/18/2022    TRIG 496 (H) 04/18/2022    FREET4 0.96 04/13/2022           Results for orders placed during the hospital encounter of 06/13/22    Echo    Interpretation Summary  · There is an LVAD present. Base speed is 5200 RPMs. The pump type is a Heartmate III. The aortic valve opens at least minimally with each cardiac cycle.  · The left ventricle is moderately enlarged with severely decreased systolic function.  · The estimated ejection fraction is 10%.  · Grade I left ventricular diastolic dysfunction.  · There is abnormal septal wall motion.  · Severe right ventricular enlargement with moderately to severely reduced right ventricular systolic function.  · Moderate to severe tricuspid regurgitation.  · The estimated PA systolic pressure is 36 mmHg. PASP may be under-estimated due to severity of TR.  · Intermediate central venous pressure (8 mmHg).  · Right atrial enlargement.        Results for orders placed during the hospital encounter of 06/13/22    Cardiac catheterization    Conclusion  · The estimated blood loss was <50 mL.  · Continue as per inpatient team.    The procedure log was documented by Documenter: Wilmer Khan and verified by Dalia Crum MD.    Date: 7/21/2022  Time: 3:12 PM         Assessment and Plan:  LVAD (left ventricular assist device) present  -     LVAD Interrogations Out Patient Only  -     Drugs of Abuse Screen, Blood; Future; Expected date: 08/18/2022    Chronic combined systolic and diastolic congestive heart failure  -     Nicotine and Metabolites, Blood; Future; Expected date: 08/18/2022    Dilated cardiomyopathy    Stage 3a chronic kidney disease: stable.    Diabetes mellitus due to underlying condition, uncontrolled, with hypoglycemia and coma: stable. Last Hgb A1C was 9.2 3 months ago.    Tobacco use: denies.    Seizure-like activity: on keppra. Last episode last week.    Other orders  -     furosemide (LASIX) 80 MG tablet; Take 1 tablet (80 mg  total) by mouth once daily.  Dispense: 30 tablet; Refill: 11       1.  Chronic cardiomyopathy and systolic HF, NYHA class 2, stage D, s/p LVAD.  Hemodynamic status: warm, normotensive, low in volume.    Antithrombotic therapy and target anticoagulation: INR/Prothrombin Time 2.8. Adjustment to warfarin dose per anticoagulation clinic.      LVAD related complications:   -Bleeding (gastrointestinal, epistaxis, etc):  None. HGB 12.8.  -RV failure: yes, on milrinone, JVP low. May be able to be weaned off in the future.  -Thrombotic events and LDH:  None, 314.  -DLES and Infection: 1, none.    Plan:  -reduce dose of lasix from 80 mg BID to 80 mg daily.  -Follow up with Neurology on Monday for seizures.  -I order drug/tobacco screen for today.      Interrogation of Ventricular assist device was performed with physician analysis of device parameters and review of device function. I have personally reviewed the interrogation findings and agree with findings as stated.     Additionally, the patient has a patient centered goal of being able to improve their quality of life.

## 2022-08-18 NOTE — LETTER
August 18, 2022        Inderjit Hendricks  [address]  [address]  [place] [address]  Phone: [phone]  Fax: [phone]     Josh Pulido  [address] [facility] [address] [facility] [facility] [address]  Phone: [phone]  Fax: [phone]             [id number]  [address]  [place] [address]  Phone: [phone]   Patient: Kevan Queen   MR Number: [id number]   YOB: 1985   Date of Visit: 8/18/2022       Dear Dr. Inderjit Hendricks, Josh Pulido    Thank you for referring Kevan Queen to me for evaluation. Attached you will find relevant portions of my assessment and plan of care.    If you have questions, please do not hesitate to call me. I look forward to following Kevan Queen along with you.    Sincerely,    Josh Pulido MD    Enclosure    If you would like to receive this communication electronically, please contact [email or web address] or [phone] to request Induction Manager Link access.    Induction Manager Link is a tool which provides read-only access to select patient information with whom you have a relationship. Its easy to use and provides real time access to review your patients record including encounter summaries, notes, results, and demographic information.    If you feel you have received this communication in error or would no longer like to receive these types of communications, please e-mail [email or web address]

## 2022-08-22 ENCOUNTER — LAB VISIT (OUTPATIENT)
Dept: LAB | Facility: HOSPITAL | Age: 37
End: 2022-08-22
Payer: MEDICAID

## 2022-08-22 ENCOUNTER — OFFICE VISIT (OUTPATIENT)
Dept: TRANSPLANT | Facility: CLINIC | Age: 37
End: 2022-08-22
Attending: INTERNAL MEDICINE
Payer: MEDICAID

## 2022-08-22 ENCOUNTER — CLINICAL SUPPORT (OUTPATIENT)
Dept: TRANSPLANT | Facility: CLINIC | Age: 37
End: 2022-08-22
Payer: MEDICAID

## 2022-08-22 ENCOUNTER — PATIENT MESSAGE (OUTPATIENT)
Dept: TRANSPLANT | Facility: CLINIC | Age: 37
End: 2022-08-22

## 2022-08-22 ENCOUNTER — ANTI-COAG VISIT (OUTPATIENT)
Dept: CARDIOLOGY | Facility: CLINIC | Age: 37
End: 2022-08-22
Payer: MEDICAID

## 2022-08-22 ENCOUNTER — OFFICE VISIT (OUTPATIENT)
Dept: NEUROLOGY | Facility: CLINIC | Age: 37
End: 2022-08-22
Payer: MEDICAID

## 2022-08-22 VITALS — SYSTOLIC BLOOD PRESSURE: 80 MMHG | HEIGHT: 75 IN | TEMPERATURE: 98 F | BODY MASS INDEX: 23.5 KG/M2 | WEIGHT: 189 LBS

## 2022-08-22 VITALS — WEIGHT: 180 LBS | BODY MASS INDEX: 21.92 KG/M2 | HEIGHT: 76 IN

## 2022-08-22 DIAGNOSIS — Z95.811 LVAD (LEFT VENTRICULAR ASSIST DEVICE) PRESENT: Primary | ICD-10-CM

## 2022-08-22 DIAGNOSIS — I50.42 CHRONIC COMBINED SYSTOLIC AND DIASTOLIC CONGESTIVE HEART FAILURE: ICD-10-CM

## 2022-08-22 DIAGNOSIS — F17.201 MILD TOBACCO ABUSE IN EARLY REMISSION: ICD-10-CM

## 2022-08-22 DIAGNOSIS — E08.641 DIABETES MELLITUS DUE TO UNDERLYING CONDITION, UNCONTROLLED, WITH HYPOGLYCEMIA AND COMA: ICD-10-CM

## 2022-08-22 DIAGNOSIS — R56.9 SEIZURE-LIKE ACTIVITY: Primary | ICD-10-CM

## 2022-08-22 DIAGNOSIS — G40.109 TEMPORAL LOBE SEIZURE: ICD-10-CM

## 2022-08-22 DIAGNOSIS — Z95.811 LVAD (LEFT VENTRICULAR ASSIST DEVICE) PRESENT: ICD-10-CM

## 2022-08-22 DIAGNOSIS — I42.0 FAMILIAL DILATED CARDIOMYOPATHY: ICD-10-CM

## 2022-08-22 PROBLEM — T14.8XXA SURGICAL WOUND PRESENT: Status: RESOLVED | Noted: 2022-06-29 | Resolved: 2022-08-22

## 2022-08-22 PROBLEM — B95.7 STAPHYLOCOCCUS EPIDERMIDIS BACTEREMIA: Status: RESOLVED | Noted: 2022-06-22 | Resolved: 2022-08-22

## 2022-08-22 PROBLEM — E87.1 HYPONATREMIA: Status: RESOLVED | Noted: 2022-07-07 | Resolved: 2022-08-22

## 2022-08-22 PROBLEM — N18.30 CKD (CHRONIC KIDNEY DISEASE) STAGE 3, GFR 30-59 ML/MIN: Status: RESOLVED | Noted: 2022-04-29 | Resolved: 2022-08-22

## 2022-08-22 PROBLEM — R74.01 TRANSAMINITIS: Status: RESOLVED | Noted: 2020-10-26 | Resolved: 2022-08-22

## 2022-08-22 PROBLEM — R78.81 STAPHYLOCOCCUS EPIDERMIDIS BACTEREMIA: Status: RESOLVED | Noted: 2022-06-22 | Resolved: 2022-08-22

## 2022-08-22 LAB
ALBUMIN SERPL BCP-MCNC: 3.8 G/DL (ref 3.5–5.2)
ALP SERPL-CCNC: 152 U/L (ref 55–135)
ALT SERPL W/O P-5'-P-CCNC: 33 U/L (ref 10–44)
ANION GAP SERPL CALC-SCNC: 12 MMOL/L (ref 8–16)
AST SERPL-CCNC: 42 U/L (ref 10–40)
BASOPHILS # BLD AUTO: 0.05 K/UL (ref 0–0.2)
BASOPHILS NFR BLD: 0.6 % (ref 0–1.9)
BILIRUB DIRECT SERPL-MCNC: 0.3 MG/DL (ref 0.1–0.3)
BILIRUB SERPL-MCNC: 0.7 MG/DL (ref 0.1–1)
BNP SERPL-MCNC: 96 PG/ML (ref 0–99)
BUN SERPL-MCNC: 12 MG/DL (ref 6–20)
CALCIUM SERPL-MCNC: 10.2 MG/DL (ref 8.7–10.5)
CHLORIDE SERPL-SCNC: 98 MMOL/L (ref 95–110)
CO2 SERPL-SCNC: 28 MMOL/L (ref 23–29)
CREAT SERPL-MCNC: 1.4 MG/DL (ref 0.5–1.4)
CRP SERPL-MCNC: 4.1 MG/L (ref 0–8.2)
DIFFERENTIAL METHOD: ABNORMAL
EOSINOPHIL # BLD AUTO: 0.1 K/UL (ref 0–0.5)
EOSINOPHIL NFR BLD: 1.7 % (ref 0–8)
ERYTHROCYTE [DISTWIDTH] IN BLOOD BY AUTOMATED COUNT: 21.1 % (ref 11.5–14.5)
EST. GFR  (NO RACE VARIABLE): >60 ML/MIN/1.73 M^2
GLUCOSE SERPL-MCNC: 233 MG/DL (ref 70–110)
HCT VFR BLD AUTO: 38.6 % (ref 40–54)
HGB BLD-MCNC: 12.3 G/DL (ref 14–18)
IMM GRANULOCYTES # BLD AUTO: 0.01 K/UL (ref 0–0.04)
IMM GRANULOCYTES NFR BLD AUTO: 0.1 % (ref 0–0.5)
INR PPP: 1.8 (ref 0.8–1.2)
LDH SERPL L TO P-CCNC: 312 U/L (ref 110–260)
LYMPHOCYTES # BLD AUTO: 2.1 K/UL (ref 1–4.8)
LYMPHOCYTES NFR BLD: 25.9 % (ref 18–48)
MAGNESIUM SERPL-MCNC: 1.9 MG/DL (ref 1.6–2.6)
MCH RBC QN AUTO: 24.7 PG (ref 27–31)
MCHC RBC AUTO-ENTMCNC: 31.9 G/DL (ref 32–36)
MCV RBC AUTO: 78 FL (ref 82–98)
MONOCYTES # BLD AUTO: 0.6 K/UL (ref 0.3–1)
MONOCYTES NFR BLD: 7.7 % (ref 4–15)
NEUTROPHILS # BLD AUTO: 5.3 K/UL (ref 1.8–7.7)
NEUTROPHILS NFR BLD: 64 % (ref 38–73)
NRBC BLD-RTO: 0 /100 WBC
PHOSPHATE SERPL-MCNC: 3.8 MG/DL (ref 2.7–4.5)
PLATELET # BLD AUTO: 370 K/UL (ref 150–450)
PMV BLD AUTO: 8.9 FL (ref 9.2–12.9)
POTASSIUM SERPL-SCNC: 4.1 MMOL/L (ref 3.5–5.1)
PREALB SERPL-MCNC: 28 MG/DL (ref 20–43)
PROT SERPL-MCNC: 9 G/DL (ref 6–8.4)
PROTHROMBIN TIME: 18.2 SEC (ref 9–12.5)
RBC # BLD AUTO: 4.97 M/UL (ref 4.6–6.2)
SODIUM SERPL-SCNC: 138 MMOL/L (ref 136–145)
WBC # BLD AUTO: 8.26 K/UL (ref 3.9–12.7)

## 2022-08-22 PROCEDURE — 3066F NEPHROPATHY DOC TX: CPT | Mod: CPTII,,, | Performed by: PSYCHIATRY & NEUROLOGY

## 2022-08-22 PROCEDURE — 3060F PR POS MICROALBUMINURIA RESULT DOCUMENTED/REVIEW: ICD-10-PCS | Mod: CPTII,,, | Performed by: INTERNAL MEDICINE

## 2022-08-22 PROCEDURE — 93750 INTERROGATION VAD IN PERSON: CPT | Mod: S$PBB,,, | Performed by: INTERNAL MEDICINE

## 2022-08-22 PROCEDURE — 93750 PR INTERROGATE VENT ASSIST DEV, IN PERSON, W PHYSICIAN ANALYSIS: ICD-10-PCS | Mod: S$PBB,,, | Performed by: INTERNAL MEDICINE

## 2022-08-22 PROCEDURE — 3008F PR BODY MASS INDEX (BMI) DOCUMENTED: ICD-10-PCS | Mod: CPTII,,, | Performed by: INTERNAL MEDICINE

## 2022-08-22 PROCEDURE — 85610 PROTHROMBIN TIME: CPT | Performed by: INTERNAL MEDICINE

## 2022-08-22 PROCEDURE — 1160F PR REVIEW ALL MEDS BY PRESCRIBER/CLIN PHARMACIST DOCUMENTED: ICD-10-PCS | Mod: CPTII,,, | Performed by: INTERNAL MEDICINE

## 2022-08-22 PROCEDURE — 1160F PR REVIEW ALL MEDS BY PRESCRIBER/CLIN PHARMACIST DOCUMENTED: ICD-10-PCS | Mod: CPTII,,, | Performed by: PSYCHIATRY & NEUROLOGY

## 2022-08-22 PROCEDURE — 83880 ASSAY OF NATRIURETIC PEPTIDE: CPT | Performed by: INTERNAL MEDICINE

## 2022-08-22 PROCEDURE — 99214 PR OFFICE/OUTPT VISIT, EST, LEVL IV, 30-39 MIN: ICD-10-PCS | Mod: 25,S$PBB,, | Performed by: INTERNAL MEDICINE

## 2022-08-22 PROCEDURE — 4010F ACE/ARB THERAPY RXD/TAKEN: CPT | Mod: CPTII,,, | Performed by: PSYCHIATRY & NEUROLOGY

## 2022-08-22 PROCEDURE — 93793 PR ANTICOAGULANT MGMT FOR PT TAKING WARFARIN: ICD-10-PCS | Mod: ,,,

## 2022-08-22 PROCEDURE — 1160F RVW MEDS BY RX/DR IN RCRD: CPT | Mod: CPTII,,, | Performed by: INTERNAL MEDICINE

## 2022-08-22 PROCEDURE — 99999 PR PBB SHADOW E&M-EST. PATIENT-LVL IV: CPT | Mod: PBBFAC,,, | Performed by: INTERNAL MEDICINE

## 2022-08-22 PROCEDURE — 99999 PR PBB SHADOW E&M-EST. PATIENT-LVL IV: ICD-10-PCS | Mod: PBBFAC,,, | Performed by: PSYCHIATRY & NEUROLOGY

## 2022-08-22 PROCEDURE — 3060F PR POS MICROALBUMINURIA RESULT DOCUMENTED/REVIEW: ICD-10-PCS | Mod: CPTII,,, | Performed by: PSYCHIATRY & NEUROLOGY

## 2022-08-22 PROCEDURE — 1160F RVW MEDS BY RX/DR IN RCRD: CPT | Mod: CPTII,,, | Performed by: PSYCHIATRY & NEUROLOGY

## 2022-08-22 PROCEDURE — 99999 PR PBB SHADOW E&M-EST. PATIENT-LVL IV: ICD-10-PCS | Mod: PBBFAC,,, | Performed by: INTERNAL MEDICINE

## 2022-08-22 PROCEDURE — 99215 OFFICE O/P EST HI 40 MIN: CPT | Mod: S$PBB,,, | Performed by: PSYCHIATRY & NEUROLOGY

## 2022-08-22 PROCEDURE — 93793 ANTICOAG MGMT PT WARFARIN: CPT | Mod: ,,,

## 2022-08-22 PROCEDURE — 1159F PR MEDICATION LIST DOCUMENTED IN MEDICAL RECORD: ICD-10-PCS | Mod: CPTII,,, | Performed by: INTERNAL MEDICINE

## 2022-08-22 PROCEDURE — 84134 ASSAY OF PREALBUMIN: CPT | Performed by: INTERNAL MEDICINE

## 2022-08-22 PROCEDURE — 3066F NEPHROPATHY DOC TX: CPT | Mod: CPTII,,, | Performed by: INTERNAL MEDICINE

## 2022-08-22 PROCEDURE — 4010F ACE/ARB THERAPY RXD/TAKEN: CPT | Mod: CPTII,,, | Performed by: INTERNAL MEDICINE

## 2022-08-22 PROCEDURE — 83615 LACTATE (LD) (LDH) ENZYME: CPT | Performed by: INTERNAL MEDICINE

## 2022-08-22 PROCEDURE — 82248 BILIRUBIN DIRECT: CPT | Performed by: INTERNAL MEDICINE

## 2022-08-22 PROCEDURE — 3066F PR DOCUMENTATION OF TREATMENT FOR NEPHROPATHY: ICD-10-PCS | Mod: CPTII,,, | Performed by: INTERNAL MEDICINE

## 2022-08-22 PROCEDURE — 3066F PR DOCUMENTATION OF TREATMENT FOR NEPHROPATHY: ICD-10-PCS | Mod: CPTII,,, | Performed by: PSYCHIATRY & NEUROLOGY

## 2022-08-22 PROCEDURE — 3044F HG A1C LEVEL LT 7.0%: CPT | Mod: CPTII,,, | Performed by: INTERNAL MEDICINE

## 2022-08-22 PROCEDURE — 4010F PR ACE/ARB THEARPY RXD/TAKEN: ICD-10-PCS | Mod: CPTII,,, | Performed by: PSYCHIATRY & NEUROLOGY

## 2022-08-22 PROCEDURE — 3008F BODY MASS INDEX DOCD: CPT | Mod: CPTII,,, | Performed by: PSYCHIATRY & NEUROLOGY

## 2022-08-22 PROCEDURE — 1159F MED LIST DOCD IN RCRD: CPT | Mod: CPTII,,, | Performed by: INTERNAL MEDICINE

## 2022-08-22 PROCEDURE — 1111F DSCHRG MED/CURRENT MED MERGE: CPT | Mod: CPTII,,, | Performed by: PSYCHIATRY & NEUROLOGY

## 2022-08-22 PROCEDURE — 99215 PR OFFICE/OUTPT VISIT, EST, LEVL V, 40-54 MIN: ICD-10-PCS | Mod: S$PBB,,, | Performed by: PSYCHIATRY & NEUROLOGY

## 2022-08-22 PROCEDURE — 83735 ASSAY OF MAGNESIUM: CPT | Performed by: INTERNAL MEDICINE

## 2022-08-22 PROCEDURE — 93750 INTERROGATION VAD IN PERSON: CPT | Mod: PBBFAC | Performed by: INTERNAL MEDICINE

## 2022-08-22 PROCEDURE — 36415 COLL VENOUS BLD VENIPUNCTURE: CPT | Performed by: INTERNAL MEDICINE

## 2022-08-22 PROCEDURE — 99214 OFFICE O/P EST MOD 30 MIN: CPT | Mod: 25,S$PBB,, | Performed by: INTERNAL MEDICINE

## 2022-08-22 PROCEDURE — 1111F PR DISCHARGE MEDS RECONCILED W/ CURRENT OUTPATIENT MED LIST: ICD-10-PCS | Mod: CPTII,,, | Performed by: INTERNAL MEDICINE

## 2022-08-22 PROCEDURE — 86140 C-REACTIVE PROTEIN: CPT | Performed by: INTERNAL MEDICINE

## 2022-08-22 PROCEDURE — 93750 OP LVAD INTERROGATION: ICD-10-PCS | Mod: S$PBB,,, | Performed by: INTERNAL MEDICINE

## 2022-08-22 PROCEDURE — 3044F PR MOST RECENT HEMOGLOBIN A1C LEVEL <7.0%: ICD-10-PCS | Mod: CPTII,,, | Performed by: PSYCHIATRY & NEUROLOGY

## 2022-08-22 PROCEDURE — 99999 PR PBB SHADOW E&M-EST. PATIENT-LVL IV: CPT | Mod: PBBFAC,,, | Performed by: PSYCHIATRY & NEUROLOGY

## 2022-08-22 PROCEDURE — 1159F PR MEDICATION LIST DOCUMENTED IN MEDICAL RECORD: ICD-10-PCS | Mod: CPTII,,, | Performed by: PSYCHIATRY & NEUROLOGY

## 2022-08-22 PROCEDURE — 3060F POS MICROALBUMINURIA REV: CPT | Mod: CPTII,,, | Performed by: INTERNAL MEDICINE

## 2022-08-22 PROCEDURE — 99214 OFFICE O/P EST MOD 30 MIN: CPT | Mod: PBBFAC,25 | Performed by: PSYCHIATRY & NEUROLOGY

## 2022-08-22 PROCEDURE — 99214 OFFICE O/P EST MOD 30 MIN: CPT | Mod: PBBFAC,27,25 | Performed by: INTERNAL MEDICINE

## 2022-08-22 PROCEDURE — 3044F HG A1C LEVEL LT 7.0%: CPT | Mod: CPTII,,, | Performed by: PSYCHIATRY & NEUROLOGY

## 2022-08-22 PROCEDURE — 3008F BODY MASS INDEX DOCD: CPT | Mod: CPTII,,, | Performed by: INTERNAL MEDICINE

## 2022-08-22 PROCEDURE — 3008F PR BODY MASS INDEX (BMI) DOCUMENTED: ICD-10-PCS | Mod: CPTII,,, | Performed by: PSYCHIATRY & NEUROLOGY

## 2022-08-22 PROCEDURE — 4010F PR ACE/ARB THEARPY RXD/TAKEN: ICD-10-PCS | Mod: CPTII,,, | Performed by: INTERNAL MEDICINE

## 2022-08-22 PROCEDURE — 3060F POS MICROALBUMINURIA REV: CPT | Mod: CPTII,,, | Performed by: PSYCHIATRY & NEUROLOGY

## 2022-08-22 PROCEDURE — 85025 COMPLETE CBC W/AUTO DIFF WBC: CPT | Performed by: INTERNAL MEDICINE

## 2022-08-22 PROCEDURE — 84100 ASSAY OF PHOSPHORUS: CPT | Performed by: INTERNAL MEDICINE

## 2022-08-22 PROCEDURE — 1111F DSCHRG MED/CURRENT MED MERGE: CPT | Mod: CPTII,,, | Performed by: INTERNAL MEDICINE

## 2022-08-22 PROCEDURE — 1159F MED LIST DOCD IN RCRD: CPT | Mod: CPTII,,, | Performed by: PSYCHIATRY & NEUROLOGY

## 2022-08-22 PROCEDURE — 1111F PR DISCHARGE MEDS RECONCILED W/ CURRENT OUTPATIENT MED LIST: ICD-10-PCS | Mod: CPTII,,, | Performed by: PSYCHIATRY & NEUROLOGY

## 2022-08-22 PROCEDURE — 80053 COMPREHEN METABOLIC PANEL: CPT | Performed by: INTERNAL MEDICINE

## 2022-08-22 PROCEDURE — 3044F PR MOST RECENT HEMOGLOBIN A1C LEVEL <7.0%: ICD-10-PCS | Mod: CPTII,,, | Performed by: INTERNAL MEDICINE

## 2022-08-22 NOTE — PROGRESS NOTES
Subjective:   Patient ID:  Kevan Queen is a 37 y.o. male who presents for LVAD followup visit.    Implant Date:6/29/2022  Initials:JxP     Heartmate 3 RPM 5100     INR goal: 2-3   Bridge with Heparin   Antiplatelets: Alon trial   Inotropes: Milrinone @ 0.25 mcg/kg/min    TXP JOY INTERROGATIONS 8/22/2022   Type HeartMate3   Flow 3.2   Speed 5100   PI 7.3   Power (Serna) 3.7   LSL 4700   Pulsatility No Pulse   Interrogation of Ventricular assist device was performed with physician analysis of device parameters and review of device function. I have personally reviewed the interrogation findings and agree with findings as stated.     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 He also completed a course of IV Abx for staph epi. He was weaned off  but he had to restarted due to RVF. He was eventually transitioned to milrinone (secondary to  shortage) now on 0.25 mcg/kg/min.      He is doing great with very mild RAMIREZ at times and very pleased with his progress.  He is off smoking since April 2022.    He has low flow alarm lasting 16 seconds last week but none since increasing fluid intake.    Review of Systems   Constitutional: Negative for chills, fever, malaise/fatigue, night sweats, weight gain and weight loss.   Cardiovascular: Positive for dyspnea on exertion. Negative for chest pain, irregular heartbeat, leg swelling, near-syncope, orthopnea, palpitations, paroxysmal nocturnal dyspnea and syncope.   Respiratory: Negative for cough, sputum production and wheezing.    Hematologic/Lymphatic: Does not bruise/bleed easily.   Musculoskeletal: Negative for arthritis,  "joint pain and stiffness.   Gastrointestinal: Negative for hematochezia and melena.   Genitourinary: Negative for hematuria.   Neurological: Negative for brief paralysis, focal weakness, seizures and weakness.       Objective:   Blood pressure (!) 80/0, temperature 97.9 °F (36.6 °C), height 6' 3" (1.905 m), weight 85.7 kg (189 lb).body mass index is 23.62 kg/m².  Doppler: 80  Physical Exam  Constitutional:       General: He is not in acute distress.     Appearance: He is well-developed. He is not ill-appearing, toxic-appearing or diaphoretic.      Comments: BP (!) 80/0 (BP Location: Left arm, Patient Position: Sitting) Comment (BP Method): Doppler  Temp 97.9 °F (36.6 °C)   Ht 6' 3" (1.905 m)   Wt 85.7 kg (189 lb)   BMI 23.62 kg/m²   Friendly BM in NAD   HENT:      Head: Normocephalic and atraumatic.   Eyes:      General: No scleral icterus.        Right eye: No discharge.         Left eye: No discharge.      Conjunctiva/sclera: Conjunctivae normal.   Neck:      Thyroid: No thyromegaly.      Vascular: No JVD (below 6 cm on exam today).      Trachea: No tracheal deviation.      Comments: JVP below 6 cm on exam today  Cardiovascular:      Comments: Normal LVAD sounds; DL 1  Pulmonary:      Effort: Pulmonary effort is normal. No respiratory distress.      Breath sounds: Normal breath sounds. No wheezing or rales.   Abdominal:      General: Bowel sounds are normal. There is no distension.      Palpations: Abdomen is soft. There is no mass.      Tenderness: There is no abdominal tenderness. There is no guarding or rebound.   Musculoskeletal:         General: No tenderness.      Right lower leg: No edema.      Left lower leg: No edema.   Skin:     General: Skin is warm and dry.   Neurological:      General: No focal deficit present.      Mental Status: He is alert. Mental status is at baseline.   Psychiatric:         Mood and Affect: Mood normal.         Behavior: Behavior normal.         Thought Content: Thought " content normal.         Judgment: Judgment normal.       Lab Results   Component Value Date    BNP 96 08/22/2022     08/22/2022    K 4.1 08/22/2022    MG 1.9 08/22/2022    CL 98 08/22/2022    CO2 28 08/22/2022    PHOS 3.8 08/22/2022    BUN 12 08/22/2022    CREATININE 1.4 08/22/2022     (H) 08/22/2022    HGBA1C 9.2 (H) 05/20/2022    HGBA1C 6.2 (H) 04/18/2022    AST 42 (H) 08/22/2022    ALT 33 08/22/2022    ALBUMIN 3.8 08/22/2022    PROT 9.0 (H) 08/22/2022    BILITOT 0.7 08/22/2022    WBC 8.26 08/22/2022    HGB 12.3 (L) 08/22/2022    HCT 38.6 (L) 08/22/2022    HCT 29 (L) 07/23/2022     08/22/2022    INR 1.8 (H) 08/22/2022     (H) 08/22/2022    TSH 2.080 04/19/2022    FREET4 0.96 04/13/2022    CHOL 261 (H) 04/18/2022    HDL 54 04/18/2022    LDLCALC Invalid, Trig>400.0 04/18/2022    TRIG 496 (H) 04/18/2022 7/24/2022 ECHO  · There is an LVAD present. Base speed is 5200 RPMs. The pump type is a Heartmate III. The aortic valve opens at least minimally with each cardiac cycle.  · The left ventricle is moderately enlarged with severely decreased systolic function.  · The estimated ejection fraction is 10%.  · Grade I left ventricular diastolic dysfunction.  · There is abnormal septal wall motion.  · Severe right ventricular enlargement with moderately to severely reduced right ventricular systolic function.  · Moderate to severe tricuspid regurgitation.  · The estimated PA systolic pressure is 36 mmHg. PASP may be under-estimated due to severity of TR.  · Intermediate central venous pressure (8 mmHg).  · Right atrial enlargement.       Assessment:      1. LVAD (left ventricular assist device) present    2. Chronic combined systolic and diastolic congestive heart failure    3. Diabetes mellitus due to underlying condition, uncontrolled, with hypoglycemia and coma    4. Familial dilated cardiomyopathy    5. Temporal lobe seizure    6. 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        Plan:   Explained  earliest will list for tx is 6 months post LVAD (Dec 2022); his last positive cotinine test was April 2022 and he understands need to stay free of all nicotine products, second hand smoke.  Prior drug use but no longer needs to remain in remission as well.    He needs control of DM so instructed to see primary in [place]    Dr. Hendricks at [facility] in [place] follows ICD     I would like to get him off home inotrope so at next admit he will come prepared to stay for admission to observation.  If looks this good and echo acceptable will stop milrinone and perform  RHC off milrinone the next day.    Since I suspect that your cardiomyopathy was inherited it is important that all of your brother and sisters be checked with an echocardiogram, EKG as well as a pro-NT BNP or BNP.  These tests should be repeated every 3 years or sooner if symptoms develop.  I would recommend checking your children with these tests at puberty or before they participate in sports.  They too should be rechecked every 3 years.  There is a lot of research in this area and recommendations are likely to change.  At present genetic testing very difficult to interpret but I can arrange for you to see  if you would like to pursue.      Whether to undergo genetic testing is a very personal decision that should be carefully considered.  Speaking with a  before undergoing the testing is something that I recommend.  Based upon your result the  would be able to advise you on testing your children or other first degree relatives (brothers and sisters).  Some of the adverse consequences of testing include the psychological effect of knowing one carries an abnormal gene without knowing if they will ever develop the disease.  One should consider exploring whether the presence of this gene might negatively impact one's ability to obtain health, life and/or disability insurance as well as the premium for such coverage.       Important to know that if one has a mutation there is no prophylactic treatment that prevents the development of the disease.  By that I mean that it is not considered necessary to place carriers on a beta blocker and/or an ACE/ARNI regimen to lessen the clinical expression of the abnormal genotype.    Due to the variable penetrance of mutations resulting in familial cardiomyopathy people carrying this mutation need to be fully evaluated and thereafter monitored closely for disease development.  This is done by the individual observing themselves for symptoms such as, shortness of breath, trouble lying flat due to breathing at night, awakening with shortness of breath, swelling, palpitations, passing out or feeling like they may pass out.  I recommend such individuals undergo yearly examinations by a primary care physician to include measurement of pro-NT BNP or BNP.  If the individual has abnormal findings on this yearly evaluation or develops symptoms than a repeat CV exam by a Cardiologist should be accomplished.  The ECHO should be repeated every 3 years.     Patient is now NYHA II     Encourage physical activity with graded exercise program.    Requested patient to weigh themselves daily, and to notify us if their weight increases by more than 3 lbs in 1 day or 5 lbs in 1 week.     Listed for transplant: No    UNOS Patient Status  Functional Status: 90% - Able to carry on normal activity: minor symptoms of disease on milrinone for RV failure post-op now 0.25 mcg/kg/min  Physical Capacity: No Limitations  Working for Income: No  If no, reason not working: Demands of Treatment

## 2022-08-22 NOTE — PROGRESS NOTES
"Date of Implant with Heartmate 3 LVAD: 6/29/2022    PATIENT ARRIVED IN CLINIC: w/c  Accompanied by: s/o  Complaints/reason for visit today: routine    Vitals  Temperature, oral:   Temp Readings from Last 1 Encounters:   08/22/22 97.9 °F (36.6 °C)     Blood Pressure:   BP Readings from Last 3 Encounters:   08/22/22 (!) 80/0   08/18/22 (!) 78/0   08/04/22 (!) 75/0        VAD Interrogation:  TXP JOY INTERROGATIONS 8/22/2022 8/18/2022 8/4/2022   Type HeartMate3 HeartMate3 HeartMate3   Flow 3.2 3.4 3.4   Speed 5100 5100 5100   PI 7.3 8.2 4.7   Power (Serna) 3.7 3.8 3.5   LSL 4700 4700 4700   Pulsatility No Pulse Intermittent pulse No Pulse     HCT:   Lab Results   Component Value Date    HCT 38.6 (L) 08/22/2022    HCT 29 (L) 07/23/2022       History Log: [id number]  Problems / Issues / Alarms with VAD if any: None noted  VAD Sounds: HM3 Smooth    Equipment:  Emergency Equipment With Patient: yes   Any Equipment Issues: None noted   It is medically necessary to ensure patient has properly functioning equipment and wearables to prevent infection, injury or death to patient.     DLES Assessment:  Appearance Of Driveline: "1" healing  Antibiotics: NO  Velour: no  Manual & Visual Inspection Of Driveline: No kinks or tears noted  Stabilization Device In Use: yes, melendez securement device    Heartmate 3 Module Cable:  No yellow exposed and Attempted to unscrew modular cable to ensure it will be able to come lose in the event we ever need to change the modular cable while patient held the driveline in place so it would not move. Modular cable connection able to be unscrewed and re-tightened. Instructed pt to perform this weekly.    Patient MyChart Questionnaire: No flowsheet data found.     Assessment:   PAIN: NO  Complaints Of Nausea / Vomiting: None noted    Appearance and Frequency Of Stools: normal and formed without blood & daily  Color Of Urine: clear/yellow  Coping/Depression/Anxiety: coping okay  Sleep Habits: 7-8 hrs " /night  Sleep Aids: None noted  Showering: No  Activity/Exercise: walking   Driving: No.    DLES Dressing Care:   Frequency of Dressing Changes: daily & daily kit  Pt In Need Of Management Kits?:no   It is medically necessary to have VAD management kits in order to prevent infection or to assist in the healing of an infected DLES.    Labs:    Chemistry        Component Value Date/Time     08/22/2022 1317    K 4.1 08/22/2022 1317    CL 98 08/22/2022 1317    CO2 28 08/22/2022 1317    BUN 12 08/22/2022 1317    CREATININE 1.4 08/22/2022 1317     (H) 08/22/2022 1317        Component Value Date/Time    CALCIUM 10.2 08/22/2022 1317    ALKPHOS 152 (H) 08/22/2022 1317    AST 42 (H) 08/22/2022 1317    ALT 33 08/22/2022 1317    BILITOT 0.7 08/22/2022 1317    ESTGFRAFRICA >60.0 07/27/2022 1229    EGFRNONAA 54.2 (A) 07/27/2022 1229            Magnesium   Date Value Ref Range Status   08/22/2022 1.9 1.6 - 2.6 mg/dL Final       Lab Results   Component Value Date    WBC 8.26 08/22/2022    HGB 12.3 (L) 08/22/2022    HCT 38.6 (L) 08/22/2022    MCV 78 (L) 08/22/2022     08/22/2022       Lab Results   Component Value Date    INR 1.8 (H) 08/22/2022    INR 2.8 (H) 08/18/2022    INR 1.80 (H) 08/15/2022    PROTIME 20.6 (H) 08/15/2022    PROTIME 14.6 (H) 09/23/2021    PROTIME 14.4 09/04/2021       BNP   Date Value Ref Range Status   08/22/2022 96 0 - 99 pg/mL Final     Comment:     Values of less than 100 pg/ml are consistent with non-CHF populations.   08/18/2022 80 0 - 99 pg/mL Final     Comment:     Values of less than 100 pg/ml are consistent with non-CHF populations.   08/04/2022 173 (H) 0 - 99 pg/mL Final     Comment:     Values of less than 100 pg/ml are consistent with non-CHF populations.       LD   Date Value Ref Range Status   08/22/2022 312 (H) 110 - 260 U/L Final     Comment:     Results are increased in hemolyzed samples.   08/18/2022 314 (H) 110 - 260 U/L Final     Comment:     Results are increased in  hemolyzed samples.   08/04/2022 261 (H) 110 - 260 U/L Final     Comment:     Results are increased in hemolyzed samples.       Labs reviewed with patient: YES     Medication reconciliation: per MA.  Medication Detail updated today: patient did not bring the card  Coumadin Managed by: [facility]    Education: Reviewed driveline care, emergency procedures, how to change the controller, alarms with patient, as well as discussed how to page the VAD coordinator in case of an emergency.   Covid - 19 education: Reminded patient/caregiver to check temperature daily and call us if it is > 99.0.  Reminded them  to stay 6 feet away from other people, wash hands frequently, don't touch your face and stay home except to get labs, medications, and appts.    Covid Vaccine: Pt informed that we are encouraging all VAD patients to receive the COVID vaccine.  Informed pt that they can take tylenol but should avoid other NSAIDs.      Plans/Needs: Routine f/u w/ Dr Lopez. Patient reports feeling well. Had f/u w/ neurology today for seizures. Continues to use w/c for long distance d/t risk of seizure. Plan to admit after next clinic visit for milrinone wean if stable.    RTC 2 weeks       Hurricane Season: Yes, discussed with patient: With hurricane season approaching, we want to make sure you are fully prepared for any emergency.  Should the National Weather Service or your local authorities recommend a voluntary or mandatory evacuation of your area, The VAD team requires you to evacuate to a safe place.  Remember, when it is a mandatory evacuation, traffic will become an issue for your limited battery power.  Therefore, we strongly urge you to evacuate early.      The VAD team advises you to have the following in place before hurricane season:  Have an evacuation plan in place including places to evacuate, names and phone numbers.  This information is required to be given to the VAD coordinator.  1. Have your VAD  emergency contact numbers with you.  2. Make sure your prescriptions will not run out by the end of September.  3. Make sure you have enough medications, including pills, inhalers, patches,     etc. to take, should you be gone for more than 2 weeks.  4. Make sure ALL of your batteries are fully charged.  5. Bring enough dressing change supplies to last for at least 2 weeks.  6. Bring your VAD binder with you.  Make sure your binder is updated and complete with alarms reference card, patient hand book, emergency contact numbers, daily log sheets, etc.    If you do not have family or friends as an evacuation destination, we recommend evacuating to a safe area.   Do NOT evacuate to [facility].  The VAD team wants to stress the importance of planning for your evacuation in the event of a hurricane.  If you have any LVAD questions or issues, please contact the LVAD coordinator.

## 2022-08-22 NOTE — PROGRESS NOTES
"[facility] - NEUROLOGY 7TH FL [facility], [place]    Date: August 22, 2022   Patient Name: Kevan Queen   MRN: [id number]   PCP: Rosio Armendariz  Referring Provider: No ref. provider found    Assessment:      This is Kevan Queen, 37 y.o. male with NIDCM with BiV systolic heart failure s/p VAD and frequent events concerning for syncope versus seizure requiring EMU admit for definitive characterization and appropriate management.     Plan:      -  EMU admit  -  Continue LEV 1000mg bid for now       Greater than 60 minutes spent in chart review, documentation, independent review of imaging, and face to face time with patient    I discussed side effects of the medications. I asked the patient to  stop the medication if He notices serious adverse effects as we discussed and to seek immediate medical attention at an ER.     Haider Devi MD  [facility]   Department of Neurology    Subjective:        HPI:   Mr. Kevan Queen is a 37 y.o. male who presents with a chief complaint of seizures, presents with wife who corroborates history    Patient with NIDCM with BiV systolic heart failure s/p VAD admitted to hospital 6/2022 for heart failure exacerbation with two events as below on July 21.  Work up included EEG with left TIRDA and left occipital encephalomalacia previously noted CT 4/2022.  He was started on LEV which he has been tolerating but has been having identical events several times per week witnessed by his wife (longest has been four days without since discharge).  He notes these are provoked by postural changes and stress.  He has resumed buspirone, tramadol, zanaflex since discharge.    "He was had two episodes of seizure-like activity. One yesterday, and one this morning. Both involve generalized convulsions and patient is unaware during these episodes. There is no associated tongue biting, eye deviation, loss of bowel or bladder control with either episode. " "There is no confusion after the episodes, and no preceding symptoms. His serum chemistries have shown hyponatremia, hypokalemia, and hypomagnesemia that have coincided with the seizure-like events. These are currently being corrected by the primary team. WBC abruptly increased from 7/17 to 7/18 (8 to 20.07). He was complaining of chills on 7/18, following inadvertently removing the LVAD drive line. He was started on empiric vancomycin and cefepime. CT non-contrast of head on 7/19 showed no acute abnormality: there is a stable focus of hypo-attenuation along the left parasaggital occipital lobe. Differential includes seizure, myoclonus. Seizure-like activity may be due to electrolyte abnormalities. Cefepime, buspirone, may also be lowering the seizure threshold, contributing to these events. Recommend discontinuing the cefepime and buspirone, starting the patient on Keppra, and EEG monitoring >1 hr at this time. MRI contraindicated due to LVAD."    PAST MEDICAL HISTORY:  Past Medical History:   Diagnosis Date    Arthritis     Cardiomyopathy     CHF (congestive heart failure) 10/01/2020    Diabetes mellitus     Dilated cardiomyopathy 10/26/2020    Drug abuse 10/2020    Hyperosmolar hyperglycemic state (HHS) 5/25/2022    ICD (implantable cardioverter-defibrillator) in place 10/26/2020    Muscle cramping 6/15/2022    Renal disorder        PAST SURGICAL HISTORY:  Past Surgical History:   Procedure Laterality Date    APPLICATION OF WOUND VACUUM-ASSISTED CLOSURE DEVICE N/A 6/30/2022    Procedure: APPLICATION, WOUND VAC;  Surgeon: Luis F Paige MD;  Location: [facility] OR [address] [place] [address];  Service: Cardiovascular;  Laterality: N/A;  50 x 5 cm    CARDIAC DEFIBRILLATOR PLACEMENT      IMPLANTATION OF RIGHT VENTRICULAR ASSIST DEVICE (RVAD) N/A 6/29/2022    Procedure: INSERTION, RVAD;  Surgeon: Luis F Paige MD;  Location: [facility] OR [address] [place] [address];  Service: Cardiovascular;  Laterality: N/A;    INSERTION OF GRAFT TO PERICARDIUM Right " 6/30/2022    Procedure: INSERTION-RIGHT VENTRICULAR ASSIST DEVICE;  Surgeon: Luis F Paige MD;  Location: [facility] OR [facility] FLR;  Service: Cardiovascular;  Laterality: Right;    IRRIGATION OF MEDIASTINUM  6/30/2022    Procedure: IRRIGATION, MEDIASTINUM;  Surgeon: Luis F Paige MD;  [facility];  Service: Cardiovascular;;    LEFT VENTRICULAR ASSIST DEVICE Left 6/23/2022    Procedure: INSERTION-LEFT VENTRICULAR ASSIST DEVICE;  Surgeon: Luis F Paige MD;  Location: [facility] OR [facility] FLR;  Service: Cardiovascular;  Laterality: Left;    LEFT VENTRICULAR ASSIST DEVICE N/A 6/29/2022    Procedure: INSERTION-LEFT VENTRICULAR ASSIST DEVICE;  Surgeon: Luis F Paige MD;  [facility];  Service: Cardiovascular;  Laterality: N/A;    RIGHT HEART CATHETERIZATION Right 4/8/2022    Procedure: INSERTION, CATHETER, RIGHT HEART;  Surgeon: Luca Lopez Jr., MD;  Location: [facility] CATH LAB;  Service: Cardiology;  Laterality: Right;    RIGHT HEART CATHETERIZATION Right 4/19/2022    Procedure: INSERTION, CATHETER, RIGHT HEART;  Surgeon: Josh Pulido MD;  Location: [facility] CATH LAB;  Service: Cardiology;  Laterality: Right;    RIGHT HEART CATHETERIZATION Right 7/21/2022    Procedure: INSERTION, CATHETER, RIGHT HEART;  Surgeon: Dalia Crum MD;  Location: [facility] CATH LAB;  Service: Cardiology;  Laterality: Right;    STERNAL WOUND CLOSURE N/A 6/30/2022    Procedure: CLOSURE, WOUND, STERNUM;  Surgeon: Luis F Paige MD;  [facility];  Service: Cardiovascular;  Laterality: N/A;       CURRENT MEDS:  Current Outpatient Medications   Medication Sig Dispense Refill    albuterol (PROVENTIL/VENTOLIN HFA) 90 mcg/actuation inhaler INHALE 2 PUFFS BY MOUTH EVERY 4 HOURS AS NEEDED FOR COUGH OR WHEEZING      blood sugar diagnostic Strp Use to test blood glucose 4 (four) times daily. 200 each 3    blood-glucose meter Misc Use as instructed 1 each 0    busPIRone (BUSPAR) 7.5 MG tablet Take 1 tablet (7.5  "mg total) by mouth once daily at 6am. 30 tablet 11    famotidine (PEPCID) 40 MG tablet Take 1 tablet (40 mg total) by mouth once daily. 30 tablet 11    ferrous gluconate 324 mg (37.5 mg iron) Tab tablet Take 1 tablet (324 mg total) by mouth daily with breakfast. 30 tablet 11    furosemide (LASIX) 80 MG tablet Take 1 tablet (80 mg total) by mouth once daily. 30 tablet 11    gabapentin (NEURONTIN) 100 MG capsule Take 1 capsule (100 mg total) by mouth 3 (three) times daily. 90 capsule 11    insulin aspart U-100 (NOVOLOG) 100 unit/mL (3 mL) InPn pen Inject 10 Units into the skin 3 (three) times daily. 30 mL 3    insulin detemir U-100 (LEVEMIR FLEXTOUCH) 100 unit/mL (3 mL) SubQ InPn pen Inject 22 Units into the skin once daily. 18 mL 3    INV ASPIRIN 100MG/PLACEBO Take 1 capsul (100 ) mg by mouth once daily. FOR INVESTIGATIONAL USE ONLY. Protocol: GASTON III subject: [id number] 90 each 0    lancets 33 gauge Misc Use to test blood glucose 4 (four) times daily. 200 each 3    levETIRAcetam (KEPPRA) 1000 MG tablet Take 1 tablet (1,000 mg total) by mouth 2 (two) times daily. 60 tablet 11    magnesium oxide (MAG-OX) 400 mg (241.3 mg magnesium) tablet Take 2 tablets (800 mg total) by mouth 2 (two) times daily. 120 tablet 11    milrinone (PRIMACOR) 1 mg/mL injection Inject into the vein.      milrinone 20mg/100ml D5W, 200mcg/ml, (PRIMACOR) 20 mg/100 mL (200 mcg/mL) infusion Inject 34.875 mcg/min into the vein continuous.      mirtazapine (REMERON) 15 MG tablet Take 15 mg by mouth nightly.      omega-3 acid ethyl esters (LOVAZA) 1 gram capsule Take 2 capsules (2 g total) by mouth 2 (two) times daily. 360 capsule 3    pen needle, diabetic 31 gauge x 1/4" Ndle 1 Device by Misc.(Non-Drug; Combo Route) route 4 (four) times daily. 180 each 3    potassium chloride SA (K-DUR,KLOR-CON) 20 MEQ tablet Take 2 tablets (40 mEq total) by mouth 3 (three) times daily. 180 tablet 11    senna-docusate 8.6-50 mg (SENNA-S) 8.6-50 mg " "per tablet Take 2 tablets by mouth once daily. 60 tablet 11    spironolactone (ALDACTONE) 25 MG tablet Take 1 tablet (25 mg total) by mouth once daily. 30 tablet 11    tiZANidine (ZANAFLEX) 2 MG tablet Take 1 tablet (2 mg total) by mouth every 8 (eight) hours as needed (muscle cramps). 90 tablet 0    traMADoL (ULTRAM) 50 mg tablet Take 1 tablet (50 mg total) by mouth every 6 (six) hours as needed for Pain. 28 tablet 0    warfarin (COUMADIN) 3 MG tablet Take 1 tablet (3 mg total) by mouth Daily. 30 tablet 11     No current facility-administered medications for this visit.       ALLERGIES:  Review of patient's allergies indicates:   Allergen Reactions    Aspirin Other (See Comments)     Mr. Thacker is enrolled in Dr. Paige's Alon Trial and cannot have any aspirin and/or aspirin-containing products. DO NOT cancel any orders for INV Aspirin 100 mg/Placebo. If you have questions, please contact Isabel @ [phone], [phone],[email or web address], secure chat or MS Teams message.    Bumex [bumetanide] Hives    Lactose Diarrhea     Other reaction(s): Abdominal distension, gaseous    Torsemide Hives       FAMILY HISTORY:  Family History   Problem Relation Age of Onset    Heart failure Father     Diverticulosis Brother     Heart attack Maternal Grandmother     Heart attack Maternal Grandfather        SOCIAL HISTORY:  Social History     Tobacco Use    Smoking status: Former Smoker     Packs/day: 0.50     Years: 16.00     Pack years: 8.00     Start date: 10/1/2004     Quit date: 2021     Years since quittin.3    Smokeless tobacco: Never Used   Substance Use Topics    Alcohol use: Not Currently    Drug use: Not Currently     Types: Marijuana, MDMA (Ecstacy)       Review of Systems:  12 review of systems is negative except for the symptoms mentioned in HPI.        Objective:     Vitals:    22 1326   Weight: 81.6 kg (180 lb)   Height: 6' 4" (1.93 m)       General: NAD, well nourished   Eyes: no " tearing, discharge, no erythema   ENT: moist mucous membranes of the oral cavity, nares patent    Neck: Supple, full range of motion  Cardiovascular: Warm and well perfused, pulses equal and symmetrical  Lungs: Normal work of breathing, normal chest wall excursions  Skin: No rash, lesions, or breakdown on exposed skin  Psychiatry: Mood and affect are appropriate   Abdomen: soft, non tender, non distended  Extremeties: No cyanosis, clubbing or edema.    Neurological   MENTAL STATUS: Alert and oriented to person, place, and time. Attention and concentration within normal limits. Speech without dysarthria, able to name and repeat without difficulty. Recent and remote memory within normal limits   CRANIAL NERVES: Visual fields intact. PERRL. EOMI. Facial sensation intact. Face symmetrical. Hearing grossly intact. Full shoulder shrug bilaterally. Tongue protrudes midline   SENSORY: Sensation is intact to light touch throughout.   MOTOR: Normal bulk and tone. No pronator drift.     REFLEXES: UE 2+, LE mute   CEREBELLAR/COORDINATION/GAIT: Gait steady with normal arm swing and stride length. Finger to nose intact. Normal rapid alternating movements.

## 2022-08-22 NOTE — LETTER
August 22, 2022        Inderjit Hendricks  [address]  [address]  [place] [address]  Phone: [phone]  Fax: [phone]     Josh Pulido  [address] [address]  Phone: [phone]  Fax: [phone]             [email or web address]  [address]  [place] [address]  Phone: [phone]   Patient: Kevan Queen   MR Number: [id number]   YOB: 1985   Date of Visit: 8/22/2022       Dear Dr. Inderjit Hendricks, Josh Pulido    Thank you for referring Kevan Queen to me for evaluation. Attached you will find relevant portions of my assessment and plan of care.    If you have questions, please do not hesitate to call me. I look forward to following Kevan Queen along with you.    Sincerely,    Luca Lopez Jr, MD    Enclosure    If you would like to receive this communication electronically, please contact [email or web address] or [phone] to request [employer] access.    [facility] is a tool which provides read-only access to select patient information with whom you have a relationship. Its easy to use and provides real time access to review your patients record including encounter summaries, notes, results, and demographic information.    If you feel you have received this communication in error or would no longer like to receive these types of communications, please e-mail [email or web address]

## 2022-08-23 ENCOUNTER — TELEPHONE (OUTPATIENT)
Dept: NEUROLOGY | Facility: CLINIC | Age: 37
End: 2022-08-23
Payer: MEDICAID

## 2022-08-23 NOTE — PROCEDURES
TXP [facility] INTERROGATIONS 8/22/2022 8/18/2022 8/4/2022 7/27/2022 7/27/2022 7/27/2022 7/27/2022   Type HeartMate3 HeartMate3 HeartMate3 - - - -   Flow 3.2 3.4 3.4 - - - -   Speed 5100 5100 5100 - - - -   PI 7.3 8.2 4.7 - - - -   Power (Serna) 3.7 3.8 3.5 - - - -   LSL 4700 4700 4700 - - - -   Pulsatility No Pulse Intermittent pulse No Pulse Intermittent pulse Intermittent pulse Intermittent pulse Intermittent pulse   }Interrogation of Ventricular assist device was performed with physician analysis of device parameters and review of device function. I have personally reviewed the interrogation findings and agree with findings as stated.

## 2022-08-26 LAB — INR PPP: 1.8

## 2022-08-30 ENCOUNTER — TELEPHONE (OUTPATIENT)
Dept: NEUROLOGY | Facility: CLINIC | Age: 37
End: 2022-08-30
Payer: MEDICAID

## 2022-08-30 ENCOUNTER — ANTI-COAG VISIT (OUTPATIENT)
Dept: CARDIOLOGY | Facility: CLINIC | Age: 37
End: 2022-08-30
Payer: MEDICAID

## 2022-08-30 ENCOUNTER — LAB VISIT (OUTPATIENT)
Dept: LAB | Facility: HOSPITAL | Age: 37
End: 2022-08-30
Attending: INTERNAL MEDICINE
Payer: MEDICAID

## 2022-08-30 DIAGNOSIS — Z95.811 LVAD (LEFT VENTRICULAR ASSIST DEVICE) PRESENT: ICD-10-CM

## 2022-08-30 DIAGNOSIS — R56.9 SEIZURES: ICD-10-CM

## 2022-08-30 DIAGNOSIS — Z95.811 LVAD (LEFT VENTRICULAR ASSIST DEVICE) PRESENT: Primary | ICD-10-CM

## 2022-08-30 DIAGNOSIS — R56.9 SEIZURE-LIKE ACTIVITY: Primary | ICD-10-CM

## 2022-08-30 LAB
INR BLD: 2.05 (ref 0–1.3)
PROTHROMBIN TIME: 22.7 SECONDS (ref 12.5–14.5)

## 2022-08-30 PROCEDURE — 85610 PROTHROMBIN TIME: CPT

## 2022-08-30 PROCEDURE — 36415 COLL VENOUS BLD VENIPUNCTURE: CPT

## 2022-08-30 NOTE — PROGRESS NOTES
Darlyn questioned and confirmed dose . Reports patient having trouble with sleeping at night will redraw labs today at 5:00 pm .  Patient has seizures every now and then no other changes reported.

## 2022-08-30 NOTE — TELEPHONE ENCOUNTER
Called patient and his wife answered the phone, she asked Kevan when he wanted to schedule EMU, scheduled 10/17/22  @ 1pm. Aware an adult is required to accompany him for the duration including overnight. Patient's wife reports Kevan has an LVAD, I instructed her to bring this equipment with them for this admission.    In regards to an EMU admission:    Can you tolerate being connected to monitoring equipment/lines to your head, arms, and chest for a possible 7+ days? yes    Can you tolerate being tethered to the hospital room 24/7 during your EMU stay (meaning you cannot leave the room)? Yes    Have you ever had to be restrained, sedated, PEC'd, or require 1:1 observation in any past hospitalizations? no

## 2022-08-31 ENCOUNTER — ANTI-COAG VISIT (OUTPATIENT)
Dept: CARDIOLOGY | Facility: CLINIC | Age: 37
End: 2022-08-31
Payer: MEDICAID

## 2022-08-31 DIAGNOSIS — Z95.811 LVAD (LEFT VENTRICULAR ASSIST DEVICE) PRESENT: Primary | ICD-10-CM

## 2022-08-31 DIAGNOSIS — R56.9 SEIZURES: Primary | ICD-10-CM

## 2022-08-31 PROCEDURE — 93793 ANTICOAG MGMT PT WARFARIN: CPT | Mod: ,,,

## 2022-08-31 PROCEDURE — 93793 PR ANTICOAGULANT MGMT FOR PT TAKING WARFARIN: ICD-10-PCS | Mod: ,,,

## 2022-09-01 ENCOUNTER — CLINICAL SUPPORT (OUTPATIENT)
Dept: TRANSPLANT | Facility: CLINIC | Age: 37
DRG: 884 | End: 2022-09-01
Payer: MEDICAID

## 2022-09-01 ENCOUNTER — OFFICE VISIT (OUTPATIENT)
Dept: TRANSPLANT | Facility: CLINIC | Age: 37
DRG: 884 | End: 2022-09-01
Payer: MEDICAID

## 2022-09-01 ENCOUNTER — HOSPITAL ENCOUNTER (INPATIENT)
Facility: HOSPITAL | Age: 37
LOS: 5 days | Discharge: HOME | DRG: 884 | End: 2022-09-06
Attending: EMERGENCY MEDICINE | Admitting: INTERNAL MEDICINE
Payer: MEDICAID

## 2022-09-01 ENCOUNTER — HOSPITAL ENCOUNTER (OUTPATIENT)
Dept: CARDIOLOGY | Facility: HOSPITAL | Age: 37
Discharge: HOME | DRG: 884 | End: 2022-09-01
Attending: INTERNAL MEDICINE
Payer: MEDICAID

## 2022-09-01 ENCOUNTER — ANTI-COAG VISIT (OUTPATIENT)
Dept: CARDIOLOGY | Facility: CLINIC | Age: 37
End: 2022-09-01
Payer: MEDICAID

## 2022-09-01 VITALS — BODY MASS INDEX: 23.5 KG/M2 | WEIGHT: 189 LBS | HEIGHT: 75 IN | SYSTOLIC BLOOD PRESSURE: 72 MMHG

## 2022-09-01 VITALS — HEART RATE: 90 BPM | BODY MASS INDEX: 23.5 KG/M2 | WEIGHT: 189 LBS | HEIGHT: 75 IN

## 2022-09-01 DIAGNOSIS — R40.20 LOSS OF CONSCIOUSNESS: ICD-10-CM

## 2022-09-01 DIAGNOSIS — Z95.811 LVAD (LEFT VENTRICULAR ASSIST DEVICE) PRESENT: Primary | ICD-10-CM

## 2022-09-01 DIAGNOSIS — I50.43 ACUTE ON CHRONIC COMBINED SYSTOLIC AND DIASTOLIC HEART FAILURE: ICD-10-CM

## 2022-09-01 DIAGNOSIS — Z79.4 TYPE 2 DIABETES MELLITUS WITH HYPERGLYCEMIA, WITH LONG-TERM CURRENT USE OF INSULIN: ICD-10-CM

## 2022-09-01 DIAGNOSIS — N18.31 STAGE 3A CHRONIC KIDNEY DISEASE: ICD-10-CM

## 2022-09-01 DIAGNOSIS — E08.65 DIABETES MELLITUS DUE TO UNDERLYING CONDITION, UNCONTROLLED, WITH HYPERGLYCEMIA: ICD-10-CM

## 2022-09-01 DIAGNOSIS — Z95.811 LVAD (LEFT VENTRICULAR ASSIST DEVICE) PRESENT: ICD-10-CM

## 2022-09-01 DIAGNOSIS — R40.4 AWARENESS ALTERATION, TRANSIENT: ICD-10-CM

## 2022-09-01 DIAGNOSIS — S09.90XA HEAD TRAUMA, INITIAL ENCOUNTER: Primary | ICD-10-CM

## 2022-09-01 DIAGNOSIS — R56.9 SEIZURE-LIKE ACTIVITY: ICD-10-CM

## 2022-09-01 DIAGNOSIS — I50.42 CHRONIC COMBINED SYSTOLIC AND DIASTOLIC HEART FAILURE: ICD-10-CM

## 2022-09-01 DIAGNOSIS — E11.65 TYPE 2 DIABETES MELLITUS WITH HYPERGLYCEMIA, WITH LONG-TERM CURRENT USE OF INSULIN: ICD-10-CM

## 2022-09-01 DIAGNOSIS — G40.109 TEMPORAL LOBE SEIZURE: ICD-10-CM

## 2022-09-01 DIAGNOSIS — I42.8 CARDIOMYOPATHY, NONISCHEMIC: ICD-10-CM

## 2022-09-01 DIAGNOSIS — R55 SYNCOPE: ICD-10-CM

## 2022-09-01 DIAGNOSIS — I50.810 RVF (RIGHT VENTRICULAR FAILURE): ICD-10-CM

## 2022-09-01 DIAGNOSIS — D63.8 ANEMIA OF CHRONIC DISEASE: ICD-10-CM

## 2022-09-01 LAB
ALBUMIN SERPL BCP-MCNC: 3.8 G/DL (ref 3.5–5.2)
ALP SERPL-CCNC: 131 U/L (ref 55–135)
ALT SERPL W/O P-5'-P-CCNC: 40 U/L (ref 10–44)
ANION GAP SERPL CALC-SCNC: 12 MMOL/L (ref 8–16)
ANISOCYTOSIS BLD QL SMEAR: SLIGHT
ASCENDING AORTA: 2.68 CM
AST SERPL-CCNC: 57 U/L (ref 10–40)
BASOPHILS # BLD AUTO: 0.05 K/UL (ref 0–0.2)
BASOPHILS NFR BLD: 0.5 % (ref 0–1.9)
BILIRUB SERPL-MCNC: 0.4 MG/DL (ref 0.1–1)
BSA FOR ECHO PROCEDURE: 2.13 M2
BUN SERPL-MCNC: 17 MG/DL (ref 6–20)
BURR CELLS BLD QL SMEAR: ABNORMAL
CALCIUM SERPL-MCNC: 9.7 MG/DL (ref 8.7–10.5)
CHLORIDE SERPL-SCNC: 100 MMOL/L (ref 95–110)
CO2 SERPL-SCNC: 22 MMOL/L (ref 23–29)
CREAT SERPL-MCNC: 1.6 MG/DL (ref 0.5–1.4)
CV ECHO LV RWT: 0.27 CM
DACRYOCYTES BLD QL SMEAR: ABNORMAL
DIFFERENTIAL METHOD: ABNORMAL
DOP CALC LVOT AREA: 4.7 CM2
DOP CALC LVOT DIAMETER: 2.44 CM
E/E' RATIO: 5.71 M/S
ECHO LV POSTERIOR WALL: 0.82 CM (ref 0.6–1.1)
EJECTION FRACTION: 10 %
EOSINOPHIL # BLD AUTO: 0.2 K/UL (ref 0–0.5)
EOSINOPHIL NFR BLD: 1.5 % (ref 0–8)
ERYTHROCYTE [DISTWIDTH] IN BLOOD BY AUTOMATED COUNT: 21.2 % (ref 11.5–14.5)
EST. GFR  (NO RACE VARIABLE): 56.6 ML/MIN/1.73 M^2
ESTIMATED AVG GLUCOSE: 166 MG/DL (ref 68–131)
FRACTIONAL SHORTENING: 5 % (ref 28–44)
GLUCOSE SERPL-MCNC: 235 MG/DL (ref 70–110)
HBA1C MFR BLD: 7.4 % (ref 4–5.6)
HCT VFR BLD AUTO: 38.6 % (ref 40–54)
HGB BLD-MCNC: 12.4 G/DL (ref 14–18)
HYPOCHROMIA BLD QL SMEAR: ABNORMAL
IMM GRANULOCYTES # BLD AUTO: 0.02 K/UL (ref 0–0.04)
IMM GRANULOCYTES NFR BLD AUTO: 0.2 % (ref 0–0.5)
INR PPP: 2.3 (ref 0.8–1.2)
INTERVENTRICULAR SEPTUM: 0.77 CM (ref 0.6–1.1)
LA MAJOR: 4.63 CM
LA MINOR: 3.93 CM
LA WIDTH: 3.65 CM
LEFT ATRIUM SIZE: 2 CM
LEFT ATRIUM VOLUME INDEX MOD: 17.2 ML/M2
LEFT ATRIUM VOLUME INDEX: 12.3 ML/M2
LEFT ATRIUM VOLUME MOD: 36.79 CM3
LEFT ATRIUM VOLUME: 26.38 CM3
LEFT INTERNAL DIMENSION IN SYSTOLE: 5.7 CM (ref 2.1–4)
LEFT VENTRICLE DIASTOLIC VOLUME INDEX: 83.15 ML/M2
LEFT VENTRICLE DIASTOLIC VOLUME: 177.94 ML
LEFT VENTRICLE MASS INDEX: 86 G/M2
LEFT VENTRICLE SYSTOLIC VOLUME INDEX: 74.9 ML/M2
LEFT VENTRICLE SYSTOLIC VOLUME: 160.25 ML
LEFT VENTRICULAR INTERNAL DIMENSION IN DIASTOLE: 5.97 CM (ref 3.5–6)
LEFT VENTRICULAR MASS: 183.06 G
LV LATERAL E/E' RATIO: 5 M/S
LV SEPTAL E/E' RATIO: 6.67 M/S
LYMPHOCYTES # BLD AUTO: 2.5 K/UL (ref 1–4.8)
LYMPHOCYTES NFR BLD: 24.2 % (ref 18–48)
MCH RBC QN AUTO: 25.3 PG (ref 27–31)
MCHC RBC AUTO-ENTMCNC: 32.1 G/DL (ref 32–36)
MCV RBC AUTO: 79 FL (ref 82–98)
MONOCYTES # BLD AUTO: 0.8 K/UL (ref 0.3–1)
MONOCYTES NFR BLD: 7.9 % (ref 4–15)
MV PEAK E VEL: 0.4 M/S
NEUTROPHILS # BLD AUTO: 6.7 K/UL (ref 1.8–7.7)
NEUTROPHILS NFR BLD: 65.7 % (ref 38–73)
NRBC BLD-RTO: 0 /100 WBC
OVALOCYTES BLD QL SMEAR: ABNORMAL
PISA TR MAX VEL: 2.18 M/S
PLATELET # BLD AUTO: 249 K/UL (ref 150–450)
PLATELET BLD QL SMEAR: ABNORMAL
PMV BLD AUTO: 9.9 FL (ref 9.2–12.9)
POCT GLUCOSE: 286 MG/DL (ref 70–110)
POIKILOCYTOSIS BLD QL SMEAR: SLIGHT
POTASSIUM SERPL-SCNC: 4.5 MMOL/L (ref 3.5–5.1)
PROT SERPL-MCNC: 8.9 G/DL (ref 6–8.4)
PROTHROMBIN TIME: 23 SEC (ref 9–12.5)
RA MAJOR: 3.91 CM
RA PRESSURE: 3 MMHG
RA WIDTH: 3.4 CM
RBC # BLD AUTO: 4.91 M/UL (ref 4.6–6.2)
RIGHT VENTRICULAR END-DIASTOLIC DIMENSION: 5.95 CM
RV TISSUE DOPPLER FREE WALL SYSTOLIC VELOCITY 1 (APICAL 4 CHAMBER VIEW): 4.48 CM/S
SCHISTOCYTES BLD QL SMEAR: ABNORMAL
SCHISTOCYTES BLD QL SMEAR: PRESENT
SINUS: 3 CM
SODIUM SERPL-SCNC: 134 MMOL/L (ref 136–145)
SPHEROCYTES BLD QL SMEAR: ABNORMAL
STJ: 2.51 CM
TDI LATERAL: 0.08 M/S
TDI SEPTAL: 0.06 M/S
TDI: 0.07 M/S
TR MAX PG: 19 MMHG
TRICUSPID ANNULAR PLANE SYSTOLIC EXCURSION: 0.86 CM
TV REST PULMONARY ARTERY PRESSURE: 22 MMHG
WBC # BLD AUTO: 10.19 K/UL (ref 3.9–12.7)

## 2022-09-01 PROCEDURE — 93306 TTE W/DOPPLER COMPLETE: CPT

## 2022-09-01 PROCEDURE — 85610 PROTHROMBIN TIME: CPT | Performed by: EMERGENCY MEDICINE

## 2022-09-01 PROCEDURE — 93306 ECHO (CUPID ONLY): ICD-10-PCS | Mod: 26,,, | Performed by: INTERNAL MEDICINE

## 2022-09-01 PROCEDURE — 99215 OFFICE O/P EST HI 40 MIN: CPT | Mod: S$PBB,,, | Performed by: INTERNAL MEDICINE

## 2022-09-01 PROCEDURE — 99285 EMERGENCY DEPT VISIT HI MDM: CPT | Mod: 25,27

## 2022-09-01 PROCEDURE — 80053 COMPREHEN METABOLIC PANEL: CPT | Performed by: EMERGENCY MEDICINE

## 2022-09-01 PROCEDURE — 83036 HEMOGLOBIN GLYCOSYLATED A1C: CPT | Performed by: EMERGENCY MEDICINE

## 2022-09-01 PROCEDURE — 99285 EMERGENCY DEPT VISIT HI MDM: CPT | Mod: ,,, | Performed by: EMERGENCY MEDICINE

## 2022-09-01 PROCEDURE — 4010F ACE/ARB THERAPY RXD/TAKEN: CPT | Mod: CPTII,,, | Performed by: INTERNAL MEDICINE

## 2022-09-01 PROCEDURE — 93750 INTERROGATION VAD IN PERSON: CPT | Mod: S$PBB,,, | Performed by: INTERNAL MEDICINE

## 2022-09-01 PROCEDURE — 93010 ELECTROCARDIOGRAM REPORT: CPT | Mod: ,,, | Performed by: INTERNAL MEDICINE

## 2022-09-01 PROCEDURE — 25000003 PHARM REV CODE 250: Performed by: NURSE PRACTITIONER

## 2022-09-01 PROCEDURE — 3008F BODY MASS INDEX DOCD: CPT | Mod: CPTII,,, | Performed by: INTERNAL MEDICINE

## 2022-09-01 PROCEDURE — 3066F NEPHROPATHY DOC TX: CPT | Mod: CPTII,,, | Performed by: INTERNAL MEDICINE

## 2022-09-01 PROCEDURE — 93750 OP LVAD INTERROGATION: ICD-10-PCS | Mod: S$PBB,,, | Performed by: INTERNAL MEDICINE

## 2022-09-01 PROCEDURE — 3044F PR MOST RECENT HEMOGLOBIN A1C LEVEL <7.0%: ICD-10-PCS | Mod: CPTII,,, | Performed by: INTERNAL MEDICINE

## 2022-09-01 PROCEDURE — 20600001 HC STEP DOWN PRIVATE ROOM

## 2022-09-01 PROCEDURE — 27000248 HC VAD-ADDITIONAL DAY

## 2022-09-01 PROCEDURE — 95720 PR EEG, W/VIDEO, CONT RECORD, I&R, >12<26 HRS: ICD-10-PCS | Mod: ,,, | Performed by: PSYCHIATRY & NEUROLOGY

## 2022-09-01 PROCEDURE — 99211 OFF/OP EST MAY X REQ PHY/QHP: CPT | Mod: PBBFAC,25 | Performed by: INTERNAL MEDICINE

## 2022-09-01 PROCEDURE — 3060F POS MICROALBUMINURIA REV: CPT | Mod: CPTII,,, | Performed by: INTERNAL MEDICINE

## 2022-09-01 PROCEDURE — 93793 ANTICOAG MGMT PT WARFARIN: CPT | Mod: ,,,

## 2022-09-01 PROCEDURE — 99999 PR PBB SHADOW E&M-EST. PATIENT-LVL I: CPT | Mod: PBBFAC,,, | Performed by: INTERNAL MEDICINE

## 2022-09-01 PROCEDURE — 99223 1ST HOSP IP/OBS HIGH 75: CPT | Mod: ,,, | Performed by: PSYCHIATRY & NEUROLOGY

## 2022-09-01 PROCEDURE — 63600175 PHARM REV CODE 636 W HCPCS: Performed by: NURSE PRACTITIONER

## 2022-09-01 PROCEDURE — 3060F PR POS MICROALBUMINURIA RESULT DOCUMENTED/REVIEW: ICD-10-PCS | Mod: CPTII,,, | Performed by: INTERNAL MEDICINE

## 2022-09-01 PROCEDURE — 93306 TTE W/DOPPLER COMPLETE: CPT | Mod: 26,,, | Performed by: INTERNAL MEDICINE

## 2022-09-01 PROCEDURE — 25000003 PHARM REV CODE 250: Performed by: PHYSICIAN ASSISTANT

## 2022-09-01 PROCEDURE — 3044F HG A1C LEVEL LT 7.0%: CPT | Mod: CPTII,,, | Performed by: INTERNAL MEDICINE

## 2022-09-01 PROCEDURE — 85025 COMPLETE CBC W/AUTO DIFF WBC: CPT | Performed by: EMERGENCY MEDICINE

## 2022-09-01 PROCEDURE — 93005 ELECTROCARDIOGRAM TRACING: CPT

## 2022-09-01 PROCEDURE — 63600175 PHARM REV CODE 636 W HCPCS: Performed by: PHYSICIAN ASSISTANT

## 2022-09-01 PROCEDURE — 4010F PR ACE/ARB THEARPY RXD/TAKEN: ICD-10-PCS | Mod: CPTII,,, | Performed by: INTERNAL MEDICINE

## 2022-09-01 PROCEDURE — 99285 PR EMERGENCY DEPT VISIT,LEVEL V: ICD-10-PCS | Mod: ,,, | Performed by: EMERGENCY MEDICINE

## 2022-09-01 PROCEDURE — 3008F PR BODY MASS INDEX (BMI) DOCUMENTED: ICD-10-PCS | Mod: CPTII,,, | Performed by: INTERNAL MEDICINE

## 2022-09-01 PROCEDURE — 99999 PR PBB SHADOW E&M-EST. PATIENT-LVL I: ICD-10-PCS | Mod: PBBFAC,,, | Performed by: INTERNAL MEDICINE

## 2022-09-01 PROCEDURE — 3066F PR DOCUMENTATION OF TREATMENT FOR NEPHROPATHY: ICD-10-PCS | Mod: CPTII,,, | Performed by: INTERNAL MEDICINE

## 2022-09-01 PROCEDURE — 93010 EKG 12-LEAD: ICD-10-PCS | Mod: ,,, | Performed by: INTERNAL MEDICINE

## 2022-09-01 PROCEDURE — 93793 PR ANTICOAGULANT MGMT FOR PT TAKING WARFARIN: ICD-10-PCS | Mod: ,,,

## 2022-09-01 PROCEDURE — 99223 PR INITIAL HOSPITAL CARE,LEVL III: ICD-10-PCS | Mod: ,,, | Performed by: PSYCHIATRY & NEUROLOGY

## 2022-09-01 PROCEDURE — 95720 EEG PHY/QHP EA INCR W/VEEG: CPT | Mod: ,,, | Performed by: PSYCHIATRY & NEUROLOGY

## 2022-09-01 PROCEDURE — 99215 PR OFFICE/OUTPT VISIT, EST, LEVL V, 40-54 MIN: ICD-10-PCS | Mod: S$PBB,,, | Performed by: INTERNAL MEDICINE

## 2022-09-01 RX ORDER — LANOLIN ALCOHOL/MO/W.PET/CERES
800 CREAM (GRAM) TOPICAL 2 TIMES DAILY
Status: DISCONTINUED | OUTPATIENT
Start: 2022-09-01 | End: 2022-09-06 | Stop reason: HOSPADM

## 2022-09-01 RX ORDER — FERROUS GLUCONATE 324(37.5)
324 TABLET ORAL
Status: DISCONTINUED | OUTPATIENT
Start: 2022-09-02 | End: 2022-09-06 | Stop reason: HOSPADM

## 2022-09-01 RX ORDER — MILRINONE LACTATE 0.2 MG/ML
0.25 INJECTION, SOLUTION INTRAVENOUS CONTINUOUS
Status: DISCONTINUED | OUTPATIENT
Start: 2022-09-01 | End: 2022-09-06 | Stop reason: HOSPADM

## 2022-09-01 RX ORDER — ALBUTEROL SULFATE 90 UG/1
2 AEROSOL, METERED RESPIRATORY (INHALATION) EVERY 4 HOURS PRN
Status: DISCONTINUED | OUTPATIENT
Start: 2022-09-01 | End: 2022-09-06 | Stop reason: HOSPADM

## 2022-09-01 RX ORDER — WARFARIN 3 MG/1
3 TABLET ORAL DAILY
Status: DISCONTINUED | OUTPATIENT
Start: 2022-09-02 | End: 2022-09-03

## 2022-09-01 RX ORDER — POTASSIUM CHLORIDE 20 MEQ/1
40 TABLET, EXTENDED RELEASE ORAL 3 TIMES DAILY
Status: DISCONTINUED | OUTPATIENT
Start: 2022-09-01 | End: 2022-09-06 | Stop reason: HOSPADM

## 2022-09-01 RX ORDER — AMOXICILLIN 250 MG
2 CAPSULE ORAL DAILY
Status: DISCONTINUED | OUTPATIENT
Start: 2022-09-01 | End: 2022-09-06 | Stop reason: HOSPADM

## 2022-09-01 RX ORDER — GABAPENTIN 100 MG/1
100 CAPSULE ORAL 3 TIMES DAILY
Status: DISCONTINUED | OUTPATIENT
Start: 2022-09-01 | End: 2022-09-05

## 2022-09-01 RX ORDER — GLUCAGON 1 MG
1 KIT INJECTION
Status: DISCONTINUED | OUTPATIENT
Start: 2022-09-01 | End: 2022-09-06 | Stop reason: HOSPADM

## 2022-09-01 RX ORDER — OMEGA-3-ACID ETHYL ESTERS 1 G/1
2 CAPSULE, LIQUID FILLED ORAL 2 TIMES DAILY
Status: DISCONTINUED | OUTPATIENT
Start: 2022-09-01 | End: 2022-09-06 | Stop reason: HOSPADM

## 2022-09-01 RX ORDER — FUROSEMIDE 80 MG/1
80 TABLET ORAL DAILY
Status: DISCONTINUED | OUTPATIENT
Start: 2022-09-01 | End: 2022-09-06 | Stop reason: HOSPADM

## 2022-09-01 RX ORDER — IBUPROFEN 200 MG
24 TABLET ORAL
Status: DISCONTINUED | OUTPATIENT
Start: 2022-09-01 | End: 2022-09-06 | Stop reason: HOSPADM

## 2022-09-01 RX ORDER — TIZANIDINE 2 MG/1
2 TABLET ORAL EVERY 8 HOURS PRN
Status: DISCONTINUED | OUTPATIENT
Start: 2022-09-01 | End: 2022-09-03

## 2022-09-01 RX ORDER — MIRTAZAPINE 15 MG/1
15 TABLET, FILM COATED ORAL NIGHTLY
Status: DISCONTINUED | OUTPATIENT
Start: 2022-09-01 | End: 2022-09-06 | Stop reason: HOSPADM

## 2022-09-01 RX ORDER — LEVETIRACETAM 500 MG/1
1000 TABLET ORAL 2 TIMES DAILY
Status: DISCONTINUED | OUTPATIENT
Start: 2022-09-01 | End: 2022-09-03

## 2022-09-01 RX ORDER — FAMOTIDINE 20 MG/1
40 TABLET, FILM COATED ORAL DAILY
Status: DISCONTINUED | OUTPATIENT
Start: 2022-09-01 | End: 2022-09-06 | Stop reason: HOSPADM

## 2022-09-01 RX ORDER — IBUPROFEN 200 MG
16 TABLET ORAL
Status: DISCONTINUED | OUTPATIENT
Start: 2022-09-01 | End: 2022-09-06 | Stop reason: HOSPADM

## 2022-09-01 RX ORDER — INSULIN ASPART 100 [IU]/ML
8 INJECTION, SOLUTION INTRAVENOUS; SUBCUTANEOUS
Status: DISCONTINUED | OUTPATIENT
Start: 2022-09-01 | End: 2022-09-02

## 2022-09-01 RX ORDER — INSULIN ASPART 100 [IU]/ML
1-10 INJECTION, SOLUTION INTRAVENOUS; SUBCUTANEOUS
Status: DISCONTINUED | OUTPATIENT
Start: 2022-09-01 | End: 2022-09-06 | Stop reason: HOSPADM

## 2022-09-01 RX ORDER — SPIRONOLACTONE 25 MG/1
25 TABLET ORAL DAILY
Status: DISCONTINUED | OUTPATIENT
Start: 2022-09-01 | End: 2022-09-06 | Stop reason: HOSPADM

## 2022-09-01 RX ADMIN — POTASSIUM CHLORIDE 40 MEQ: 1500 TABLET, EXTENDED RELEASE ORAL at 10:09

## 2022-09-01 RX ADMIN — GABAPENTIN 100 MG: 100 CAPSULE ORAL at 10:09

## 2022-09-01 RX ADMIN — MIRTAZAPINE 15 MG: 15 TABLET, FILM COATED ORAL at 10:09

## 2022-09-01 RX ADMIN — MILRINONE LACTATE IN DEXTROSE 0.25 MCG/KG/MIN: 200 INJECTION, SOLUTION INTRAVENOUS at 07:09

## 2022-09-01 RX ADMIN — INSULIN DETEMIR 20 UNITS: 100 INJECTION, SOLUTION SUBCUTANEOUS at 10:09

## 2022-09-01 RX ADMIN — INSULIN ASPART 8 UNITS: 100 INJECTION, SOLUTION INTRAVENOUS; SUBCUTANEOUS at 07:09

## 2022-09-01 RX ADMIN — OMEGA-3-ACID ETHYL ESTERS 2 G: 1 CAPSULE, LIQUID FILLED ORAL at 10:09

## 2022-09-01 RX ADMIN — Medication 800 MG: at 10:09

## 2022-09-01 RX ADMIN — LEVETIRACETAM 1000 MG: 500 TABLET, FILM COATED ORAL at 10:09

## 2022-09-01 NOTE — ASSESSMENT & PLAN NOTE
Procedure: Device Interrogation Including analysis of device parameters  Current Settings: Ventricular Assist Device  INR therapeutic at 2.3. Reports taking coumadin in the AM and took his dose already today so will resume home dose tomorrow.   Review of device function is stable/unstable stable    TXP LVAD INTERROGATIONS 9/1/2022 8/22/2022 8/18/2022 8/4/2022 7/27/2022 7/27/2022 7/27/2022   Type - - - - HeartMate3 HeartMate3 HeartMate3   Flow - - - - 4.2 3.8 3.8   Speed - - - - 5100 5100 5100   PI - - - - 4.6 4.8 5.3   Power (Serna) - - - - 3.6 3.6 3.8   LSL - - - - 4700 4700 4700   Pulsatility No Pulse No Pulse Intermittent pulse No Pulse Intermittent pulse Intermittent pulse Intermittent pulse

## 2022-09-01 NOTE — HPI
37 y.o. male with NIDCM, HFrEF s/p LVAD (6/29/22) with recurrent episodes concerning for seizure vs. Syncope presented to ED 9/1/22 after episode in parking garage prior to Cards appt today. Patient reports episodes started after VAD placement. Typically gets warning like head feels funny with electrical sensation. Today episode was unwitnessed because his wife was getting a wheelchair when episode occurred. He fell backwards hitting his head on the ground. LOC for a few seconds with brief confusion afterwards, was back at cognitive baseline during Cards appt. LVAD was interrogated and no associated alarms with today's episode. He was advised to present to ED given coumadin and head trauma. CTH without unremarkable for acute intracranial pathology. Per chart review, he has been evaluated by Neuro during previous admissions, started on Keppra 1 g BID after EEG with left TIRDA and imaging with L occipital encephalomalacia. He followed up with Dr. Devi 8/22/22 for recurrent episodes despite Keppra. Longest duration of episode free days since hospital discharge is 4 days. On review of medications, patient is taking tramadol daily for pain. He was advised to continue Keppra and scheduled for EMU admission for event characterization in October. Neurology now consulted 9/1 for seizure evaluation.

## 2022-09-01 NOTE — CONSULTS
[facility] - Emergency Dept  Neurology  Consult Note    Patient Name: Kevan Queen  MRN: [id number]  Admission Date: 9/1/2022  Hospital Length of Stay: 0 days  Code Status: Full Code   Attending Provider: Otoniel Mallory MD   Consulting Provider: Marcella Darden PA-C  Primary Care Physician: ORALIA Cline  Principal Problem:<principal problem not specified>    Inpatient consult to Neurology  Consult performed by: Marcella Darden PA-C  Consult ordered by: Jeff Spencer PA-C         Subjective:     Chief Complaint:  ?seizure      HPI:   37 y.o. male with NIDCM, HFrEF s/p LVAD (6/29/22) with recurrent episodes concerning for seizure vs. Syncope presented to ED 9/1/22 after episode in parking garage prior to Cards appt today. Patient reports episodes started after VAD placement. Typically gets warning like head feels funny with electrical sensation. Today episode was unwitnessed because his wife was getting a wheelchair when episode occurred. He fell backwards hitting his head on the ground. LOC for a few seconds with brief confusion afterwards, was back at cognitive baseline during Cards appt. LVAD was interrogated and no associated alarms with today's episode. He was advised to present to ED given coumadin and head trauma. CTH without unremarkable for acute intracranial pathology. Per chart review, he has been evaluated by Neuro during previous admissions, started on Keppra 1 g BID after EEG with left TIRDA and imaging with L occipital encephalomalacia. He followed up with Dr. Devi 8/22/22 for recurrent episodes despite Keppra. Longest duration of episode free days since hospital discharge is 4 days. On review of medications, patient is taking tramadol daily for pain. He was advised to continue Keppra and scheduled for EMU admission for event characterization in October. Neurology now consulted 9/1 for seizure evaluation.      Past Medical History:   Diagnosis Date    Arthritis     Cardiomyopathy     CHF  (congestive heart failure) 10/01/2020    Diabetes mellitus     Dilated cardiomyopathy 10/26/2020    Drug abuse 10/2020    Hyperosmolar hyperglycemic state (HHS) 5/25/2022    ICD (implantable cardioverter-defibrillator) in place 10/26/2020    Muscle cramping 6/15/2022    Renal disorder      Past Surgical History:   Procedure Laterality Date    APPLICATION OF WOUND VACUUM-ASSISTED CLOSURE DEVICE N/A 6/30/2022    Procedure: APPLICATION, WOUND VAC;  Surgeon: Luis F Paige MD;  [facility];  Service: Cardiovascular;  Laterality: N/A;  50 x 5 cm    CARDIAC DEFIBRILLATOR PLACEMENT      IMPLANTATION OF RIGHT VENTRICULAR ASSIST DEVICE (RVAD) N/A 6/29/2022    Procedure: INSERTION, RVAD;  Surgeon: Luis F Paige MD;  [facility];  Service: Cardiovascular;  Laterality: N/A;    INSERTION OF GRAFT TO PERICARDIUM Right 6/30/2022    Procedure: INSERTION-RIGHT VENTRICULAR ASSIST DEVICE;  Surgeon: Luis F Paige MD;  [facility];  Service: Cardiovascular;  Laterality: Right;    IRRIGATION OF MEDIASTINUM  6/30/2022    Procedure: IRRIGATION, MEDIASTINUM;  Surgeon: Luis F Paige MD;  [facility];  Service: Cardiovascular;;    LEFT VENTRICULAR ASSIST DEVICE Left 6/23/2022    Procedure: INSERTION-LEFT VENTRICULAR ASSIST DEVICE;  Surgeon: Luis F Paige MD;  [facility];  Service: Cardiovascular;  Laterality: Left;    LEFT VENTRICULAR ASSIST DEVICE N/A 6/29/2022    Procedure: INSERTION-LEFT VENTRICULAR ASSIST DEVICE;  Surgeon: Luis F Paige MD;  [facility];  Service: Cardiovascular;  Laterality: N/A;    RIGHT HEART CATHETERIZATION Right 4/8/2022    Procedure: INSERTION, CATHETER, RIGHT HEART;  Surgeon: Luca Lopez Jr., MD;  Location: [facility] CATH LAB;  Service: Cardiology;  Laterality: Right;    RIGHT HEART CATHETERIZATION Right 4/19/2022    Procedure: INSERTION, CATHETER, RIGHT HEART;  Surgeon: Josh Pulido MD;  Location:  [facility] CATH LAB;  Service: Cardiology;  Laterality: Right;    RIGHT HEART CATHETERIZATION Right 7/21/2022    Procedure: INSERTION, CATHETER, RIGHT HEART;  Surgeon: Dalia Crum MD;  Location: [facility] CATH LAB;  Service: Cardiology;  Laterality: Right;    STERNAL WOUND CLOSURE N/A 6/30/2022    Procedure: CLOSURE, WOUND, STERNUM;  Surgeon: Luis F Paige MD;  [facility];  Service: Cardiovascular;  Laterality: N/A;     Review of patient's allergies indicates:   Allergen Reactions    Aspirin Other (See Comments)     Mr. Thacker is enrolled in Dr. Paige's Alon Trial and cannot have any aspirin and/or aspirin-containing products. DO NOT cancel any orders for INV Aspirin 100 mg/Placebo. If you have questions, please contact Isabel @ [phone], [phone],[email or web address], secure chat or MS Teams message.    Bumex [bumetanide] Hives    Lactose Diarrhea     Other reaction(s): Abdominal distension, gaseous    Torsemide Hives     No current facility-administered medications on file prior to encounter.     Current Outpatient Medications on File Prior to Encounter   Medication Sig    albuterol (PROVENTIL/VENTOLIN HFA) 90 mcg/actuation inhaler INHALE 2 PUFFS BY MOUTH EVERY 4 HOURS AS NEEDED FOR COUGH OR WHEEZING    blood sugar diagnostic Strp Use to test blood glucose 4 (four) times daily.    blood-glucose meter Misc Use as instructed    busPIRone (BUSPAR) 7.5 MG tablet Take 1 tablet (7.5 mg total) by mouth once daily at 6am.    famotidine (PEPCID) 40 MG tablet Take 1 tablet (40 mg total) by mouth once daily.    ferrous gluconate 324 mg (37.5 mg iron) Tab tablet Take 1 tablet (324 mg total) by mouth daily with breakfast.    furosemide (LASIX) 80 MG tablet Take 1 tablet (80 mg total) by mouth once daily.    gabapentin (NEURONTIN) 100 MG capsule Take 1 capsule (100 mg total) by mouth 3 (three) times daily.    insulin aspart U-100 (NOVOLOG) 100 unit/mL (3 mL) InPn pen Inject 10 Units into the skin  Validate Referring Provider (Can Hide Referring Provider In Settings Tab): No "3 (three) times daily.    insulin detemir U-100 (LEVEMIR FLEXTOUCH) 100 unit/mL (3 mL) SubQ InPn pen Inject 22 Units into the skin once daily.    INV ASPIRIN 100MG/PLACEBO Take 1 capsul (100 ) mg by mouth once daily. FOR INVESTIGATIONAL USE ONLY. Protocol: GASTON III subject: [id number]    lancets 33 gauge Misc Use to test blood glucose 4 (four) times daily.    levETIRAcetam (KEPPRA) 1000 MG tablet Take 1 tablet (1,000 mg total) by mouth 2 (two) times daily.    magnesium oxide (MAG-OX) 400 mg (241.3 mg magnesium) tablet Take 2 tablets (800 mg total) by mouth 2 (two) times daily.    milrinone (PRIMACOR) 1 mg/mL injection Inject into the vein.    milrinone 20mg/100ml D5W, 200mcg/ml, (PRIMACOR) 20 mg/100 mL (200 mcg/mL) infusion Inject 34.875 mcg/min into the vein continuous.    mirtazapine (REMERON) 15 MG tablet Take 15 mg by mouth nightly.    omega-3 acid ethyl esters (LOVAZA) 1 gram capsule Take 2 capsules (2 g total) by mouth 2 (two) times daily.    pen needle, diabetic 31 gauge x 1/4" Ndle 1 Device by Misc.(Non-Drug; Combo Route) route 4 (four) times daily.    potassium chloride SA (K-DUR,KLOR-CON) 20 MEQ tablet Take 2 tablets (40 mEq total) by mouth 3 (three) times daily.    senna-docusate 8.6-50 mg (SENNA-S) 8.6-50 mg per tablet Take 2 tablets by mouth once daily.    spironolactone (ALDACTONE) 25 MG tablet Take 1 tablet (25 mg total) by mouth once daily.    tiZANidine (ZANAFLEX) 2 MG tablet Take 1 tablet (2 mg total) by mouth every 8 (eight) hours as needed (muscle cramps).    warfarin (COUMADIN) 3 MG tablet Take 1 tablet (3 mg total) by mouth Daily.    [DISCONTINUED] digoxin (LANOXIN) 125 mcg tablet Take 1 tablet (0.125 mg total) by mouth once daily.    [DISCONTINUED] EScitalopram oxalate (LEXAPRO) 5 MG Tab Take 1 tablet (5 mg total) by mouth once daily.    [DISCONTINUED] insulin (LANTUS SOLOSTAR U-100 INSULIN) glargine 100 units/mL (3mL) SubQ pen Inject 10 Units into the skin every evening. " (Patient not taking: Reported on 2022)    [DISCONTINUED] metOLazone (ZAROXOLYN) 2.5 MG tablet Take 2.5 mg by mouth every other day.    [DISCONTINUED] metoprolol succinate (TOPROL-XL) 25 MG 24 hr tablet TAKE 1 TABLET(25 MG) BY MOUTH EVERY DAY    [DISCONTINUED] pantoprazole (PROTONIX) 40 MG tablet Take 1 tablet (40 mg total) by mouth once daily.    [DISCONTINUED] traMADoL (ULTRAM) 50 mg tablet Take 1 tablet (50 mg total) by mouth every 6 (six) hours as needed for Pain.     Family History       Problem Relation (Age of Onset)    Diverticulosis Brother    Heart attack Maternal Grandmother, Maternal Grandfather    Heart failure Father          Tobacco Use    Smoking status: Former     Packs/day: 0.50     Years: 16.00     Pack years: 8.00     Types: Cigarettes     Start date: 10/1/2004     Quit date: 2021     Years since quittin.3    Smokeless tobacco: Never   Substance and Sexual Activity    Alcohol use: Not Currently    Drug use: Not Currently     Types: Marijuana, MDMA (Ecstacy)    Sexual activity: Yes     Partners: Female     Birth control/protection: None     Review of Systems   Constitutional:  Positive for activity change and fatigue. Negative for fever.   HENT:  Negative for trouble swallowing and voice change.    Eyes:  Negative for visual disturbance.   Respiratory:  Positive for chest tightness and shortness of breath.    Gastrointestinal:  Negative for vomiting.   Musculoskeletal:  Negative for gait problem and neck stiffness.   Neurological:  Positive for seizures and syncope. Negative for speech difficulty and weakness.   Psychiatric/Behavioral:  Positive for sleep disturbance.    Objective:     Vital Signs (Most Recent):  Temp: 97.7 °F (36.5 °C) (22 1200)  Pulse: (!) 124 (22 1405)  Resp: (!) 24 (22 1405)  BP: (!) 73/0 (22 1200)  SpO2: 99 % (22 1405)   Vital Signs (24h Range):  Temp:  [97.6 °F (36.4 °C)-97.7 °F (36.5 °C)] 97.7 °F (36.5 °C)  Pulse:   [] 124  Resp:  [18-24] 24  SpO2:  [96 %-99 %] 99 %  BP: (72-73)/(0) 73/0     Weight: 85.3 kg (188 lb)  Body mass index is 23.5 kg/m².    Physical Exam  Eyes:      Extraocular Movements: EOM normal.   Neurological:      Mental Status: He is oriented to person, place, and time.      Motor: Motor strength is normal.      Coordination: Finger-Nose-Finger Test normal.   Psychiatric:         Speech: Speech normal.     NEUROLOGICAL EXAMINATION:     MENTAL STATUS   Oriented to person, place, and time.   Follows 2 step commands.   Attention: normal. Concentration: normal.   Speech: speech is normal   Level of consciousness: alert  Knowledge: good.   Normal comprehension.     CRANIAL NERVES     CN III, IV, VI   Extraocular motions are normal.   Nystagmus: none   Diplopia: none  Ophthalmoparesis: none    CN V   Facial sensation intact.     CN VII   Facial expression full, symmetric.     CN VIII   Hearing: intact    CN IX, X   Palate: symmetric    CN XI   CN XI normal.     CN XII   CN XII normal.        Anisocoria present (1 mm difference) either physiologic vs. Adie pupil     MOTOR EXAM   Muscle bulk: normal  Overall muscle tone: normal    Strength   Strength 5/5 throughout.     SENSORY EXAM        Diminished sensation L fingers (1-3rd digits) and L foot      GAIT AND COORDINATION     Gait  Gait: (deferred)     Coordination   Finger to nose coordination: normal    Tremor   Resting tremor: absent    Significant Labs: All pertinent lab results from the past 24 hours have been reviewed.    Significant Imaging: I have reviewed all pertinent imaging results/findings within the past 24 hours.    Assessment and Plan:     Awareness alteration, transient  37 y.o. male with NID s/p LVAD (6/2022) presents after syncope vs. seizure event in parking garage this morning. Pt reports he felt a warning funny sensation in head then lost consciousness, fell backwards hitting his head on the concrete. He proceeded to his scheduled Cards  appt and was back at cognitive baseline. LVAD interrogated without associated alarms, settings not adjusted. He was advised to present to ED where CTH without contrast was unremarkable for acute intracranial pathology. He is currently taking Keppra 1 g BID, which was started during LVAD admission for recurrent episodes of loss of awareness with L TIRDA on EEG and encephalomalacia on imaging. Recently seen by Dr. Devi who considered EMU admission in October for event characterization; however, patient unable to be admitted to the EMU service given continuous milrinone infusion.     Recommendations:  --admitted to Cards service, can hook up to LTM EEG while admitted  --continue Keppra 1 g BID  --check levetiracetam level  --please discontinue tramadol and other medications that lower seizure threshold  Patient previously taking this on a daily basis  --continue correction of electrolyte derangements     VTE Risk Mitigation (From admission, onward)         Ordered     warfarin tablet 3 mg  Daily         09/01/22 4941              Marcella Darden PA-C  General Neurology Consult

## 2022-09-01 NOTE — PROGRESS NOTES
"Subjective:   Patient ID:  Kevan Queen is a 37 y.o. male who presents for LVAD followup visit.    Implant Date:6/29/2022     Heartmate 3 RPM 5100     INR goal: 2-3   Bridge with Heparin   Antiplatelets: Alon trial   Inotropes: Milrinone @ 0.25 mcg/kg/min     TXP JOY INTERROGATIONS 9/1/2022   Type HeartMate3   Flow 3.5   Speed 5100   PI 7.4   Power (Serna) 3.7   LSL 4700   Pulsatility No Pulse       HPI  Mr. 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 He also completed a course of IV Abx for staph epi. He was weaned off  but he had to restarted due to RVF. He was eventually transitioned to milrinone (secondary to  shortage) now on 0.25 mcg/kg/min.  He has been doing well on previous visits so Dr. Lopez who saw his during his last visit wanted to wean his Milrinone if his repeat Echo showed improved RV function. He did undergo his echo this am however he just informed us that while on his way tot  clinic he had a seizure episode and hit his head. After discussing with him, in my opinion this was likely a syncopal episode (no post-ictal phase, regained consciousness immediately with no confusion and was alert and oriented to time and place as per his Wife). VAD speed is at 5100 rpm. No VAD alarms noted on interrogation occasional PI events . BP is 72 mm of Hg. DLES is a "1". INR is therapeutic at 2.05. LDh is at baseline.     Review of Systems   Constitutional: Negative. Negative for chills, decreased appetite, diaphoresis, fever, malaise/fatigue, night sweats, weight gain and weight loss.   Eyes: Negative. " "   Cardiovascular:  Positive for dyspnea on exertion and syncope. Negative for chest pain, claudication, cyanosis, irregular heartbeat, leg swelling, near-syncope, orthopnea, palpitations and paroxysmal nocturnal dyspnea.   Respiratory:  Negative for cough, hemoptysis and shortness of breath.    Endocrine: Negative.    Hematologic/Lymphatic: Negative.    Skin:  Negative for color change, dry skin and nail changes.   Musculoskeletal: Negative.    Gastrointestinal: Negative.    Genitourinary: Negative.    Neurological:  Negative for weakness.     Objective:       Doppler: 72    Physical Exam  Vitals reviewed.   Constitutional:       Appearance: He is well-developed.      Comments: There were no vitals taken for this visit.     HENT:      Head: Normocephalic.   Neck:      Vascular: No carotid bruit or JVD.   Cardiovascular:      Heart sounds: No murmur heard.     Comments: Smooth VAD hum. DLES is "1"  Pulmonary:      Effort: Pulmonary effort is normal.      Breath sounds: Normal breath sounds.   Abdominal:      General: Bowel sounds are normal.      Palpations: Abdomen is soft.   Skin:     General: Skin is warm.   Neurological:      Mental Status: He is alert.       Lab Results   Component Value Date    WBC 8.26 08/22/2022    HGB 12.3 (L) 08/22/2022    HCT 38.6 (L) 08/22/2022    MCV 78 (L) 08/22/2022     08/22/2022    CHLORIDE 92 (L) 05/20/2022    CO2 28 08/22/2022    CREATININE 1.4 08/22/2022    GLUCOSE 598 (HH) 05/20/2022    CALCIUM 10.2 08/22/2022    ALKALINEPHOS 98 06/02/2022    ALBUMIN 3.8 08/22/2022    AST 42 (H) 08/22/2022    BNP 96 08/22/2022    ALT 33 08/22/2022     (H) 08/22/2022       Lab Results   Component Value Date    INR 2.05 (H) 08/30/2022    INR 1.8 08/26/2022    INR 1.8 (H) 08/22/2022    PROTIME 22.7 (H) 08/30/2022    PROTIME 20.6 (H) 08/15/2022    PROTIME 14.6 (H) 09/23/2021       BNP   Date Value Ref Range Status   08/22/2022 96 0 - 99 pg/mL Final     Comment:     Values of less than " 100 pg/ml are consistent with non-CHF populations.   08/18/2022 80 0 - 99 pg/mL Final     Comment:     Values of less than 100 pg/ml are consistent with non-CHF populations.   08/04/2022 173 (H) 0 - 99 pg/mL Final     Comment:     Values of less than 100 pg/ml are consistent with non-CHF populations.       LD   Date Value Ref Range Status   08/22/2022 312 (H) 110 - 260 U/L Final     Comment:     Results are increased in hemolyzed samples.   08/18/2022 314 (H) 110 - 260 U/L Final     Comment:     Results are increased in hemolyzed samples.   08/04/2022 261 (H) 110 - 260 U/L Final     Comment:     Results are increased in hemolyzed samples.       Assessment:      1. LVAD (left ventricular assist device) present    2. Loss of consciousness    3. Chronic combined systolic and diastolic heart failure    4. RVF (right ventricular failure)    5. Cardiomyopathy, nonischemic    6. Stage 3a chronic kidney disease    7. Diabetes mellitus due to underlying condition, uncontrolled, with hyperglycemia        Plan:   Syncopal episode this am with head trauma. Send to ED for stat CT and admit to HTS. He does have a Hx of seizure and had a tonic clonic seizure episode a week ago. Will consult Neurology   BP is controlled.  INR is therapeutic. Euvolemic on exam.  Attempt to wean Milrinone if RV function is good. (Echo was one this am and results are pending)  VAD interrogation was performed in clinic  Any VAD management kits dispensed today medically necessary  Recommend 2 gram sodium restriction and 1500cc fluid restriction.  Encourage physical activity with graded exercise program.  Requested patient to weigh themselves daily, and to notify us if their weight increases by more than 3 lbs in 1 day or 5 lbs in 1 week.   Additionally, the patient has a patient centered goal of being able to get weaned off Milrinone.    Patient advised that it is recommended that all patients, and their close contacts and household members receive  Covid vaccination.    Listed for transplant: No    UNOS Patient Status  Functional Status: 100% - Normal, no complaints, no evidence of disease  Physical Capacity: No Limitations  Working for Income: No  If no, reason not working: Demands of Treatment    Natalya Villatoro MD

## 2022-09-01 NOTE — SUBJECTIVE & OBJECTIVE
Past Medical History:   Diagnosis Date    Arthritis     Cardiomyopathy     CHF (congestive heart failure) 10/01/2020    Diabetes mellitus     Dilated cardiomyopathy 10/26/2020    Drug abuse 10/2020    Hyperosmolar hyperglycemic state (HHS) 5/25/2022    ICD (implantable cardioverter-defibrillator) in place 10/26/2020    Muscle cramping 6/15/2022    Renal disorder        Past Surgical History:   Procedure Laterality Date    APPLICATION OF WOUND VACUUM-ASSISTED CLOSURE DEVICE N/A 6/30/2022    Procedure: APPLICATION, WOUND VAC;  Surgeon: Luis F Paige MD;  Location: [facility] OR [address] [place] [address];  Service: Cardiovascular;  Laterality: N/A;  50 x 5 cm    CARDIAC DEFIBRILLATOR PLACEMENT      IMPLANTATION OF RIGHT VENTRICULAR ASSIST DEVICE (RVAD) N/A 6/29/2022    Procedure: INSERTION, RVAD;  Surgeon: Luis F Paige MD;  [facility];  Service: Cardiovascular;  Laterality: N/A;    INSERTION OF GRAFT TO PERICARDIUM Right 6/30/2022    Procedure: INSERTION-RIGHT VENTRICULAR ASSIST DEVICE;  Surgeon: Luis F Paige MD;  [address];  Service: Cardiovascular;  Laterality: Right;    IRRIGATION OF MEDIASTINUM  6/30/2022    Procedure: IRRIGATION, MEDIASTINUM;  Surgeon: Luis F Paige MD;  [facility];  Service: Cardiovascular;;    LEFT VENTRICULAR ASSIST DEVICE Left 6/23/2022    Procedure: INSERTION-LEFT VENTRICULAR ASSIST DEVICE;  Surgeon: Luis F Paige MD;  [facility];  Service: Cardiovascular;  Laterality: Left;    LEFT VENTRICULAR ASSIST DEVICE N/A 6/29/2022    Procedure: INSERTION-LEFT VENTRICULAR ASSIST DEVICE;  Surgeon: Luis F Paige MD;  [facility];  Service: Cardiovascular;  Laterality: N/A;    RIGHT HEART CATHETERIZATION Right 4/8/2022    Procedure: INSERTION, CATHETER, RIGHT HEART;  Surgeon: Luca Lopez Jr., MD;  Location: [facility] CATH LAB;  Service: Cardiology;  Laterality: Right;    RIGHT HEART CATHETERIZATION Right 4/19/2022    Procedure: INSERTION,  CATHETER, RIGHT HEART;  Surgeon: Josh Pulido MD;  Location: [facility] CATH LAB;  Service: Cardiology;  Laterality: Right;    RIGHT HEART CATHETERIZATION Right 7/21/2022    Procedure: INSERTION, CATHETER, RIGHT HEART;  Surgeon: Dalia Crum MD;  Location: [facility] CATH LAB;  Service: Cardiology;  Laterality: Right;    STERNAL WOUND CLOSURE N/A 6/30/2022    Procedure: CLOSURE, WOUND, STERNUM;  Surgeon: Luis F Paige MD;  Location: [facility] OR [facility];  Service: Cardiovascular;  Laterality: N/A;       Review of patient's allergies indicates:   Allergen Reactions    Aspirin Other (See Comments)     Mr. Thacker is enrolled in Dr. Paige's Alon Trial and cannot have any aspirin and/or aspirin-containing products. DO NOT cancel any orders for INV Aspirin 100 mg/Placebo. If you have questions, please contact Isabel @ [phone], [phone],[email or web address], secure chat or MS Teams message.    Bumex [bumetanide] Hives    Lactose Diarrhea     Other reaction(s): Abdominal distension, gaseous    Torsemide Hives       Current Facility-Administered Medications   Medication    albuterol inhaler 2 puff    [START ON 9/2/2022] busPIRone split tablet 7.5 mg    famotidine tablet 40 mg    [START ON 9/2/2022] ferrous gluconate tablet 324 mg    furosemide tablet 80 mg    gabapentin capsule 100 mg    INV ASPIRIN 100MG/PLACEBO 100 mg    levETIRAcetam tablet 1,000 mg    magnesium oxide tablet 800 mg    milrinone 20mg in D5W 100 mL infusion    mirtazapine tablet 15 mg    omega-3 acid ethyl esters capsule 2 g    potassium chloride SA CR tablet 40 mEq    senna-docusate 8.6-50 mg per tablet 2 tablet    spironolactone tablet 25 mg    tiZANidine tablet 2 mg    [START ON 9/2/2022] warfarin tablet 3 mg     Current Outpatient Medications   Medication Sig    albuterol (PROVENTIL/VENTOLIN HFA) 90 mcg/actuation inhaler INHALE 2 PUFFS BY MOUTH EVERY 4 HOURS AS NEEDED FOR COUGH OR WHEEZING    blood sugar diagnostic Strp Use to test blood glucose 4  "(four) times daily.    blood-glucose meter Misc Use as instructed    busPIRone (BUSPAR) 7.5 MG tablet Take 1 tablet (7.5 mg total) by mouth once daily at 6am.    famotidine (PEPCID) 40 MG tablet Take 1 tablet (40 mg total) by mouth once daily.    ferrous gluconate 324 mg (37.5 mg iron) Tab tablet Take 1 tablet (324 mg total) by mouth daily with breakfast.    furosemide (LASIX) 80 MG tablet Take 1 tablet (80 mg total) by mouth once daily.    gabapentin (NEURONTIN) 100 MG capsule Take 1 capsule (100 mg total) by mouth 3 (three) times daily.    insulin aspart U-100 (NOVOLOG) 100 unit/mL (3 mL) InPn pen Inject 10 Units into the skin 3 (three) times daily.    insulin detemir U-100 (LEVEMIR FLEXTOUCH) 100 unit/mL (3 mL) SubQ InPn pen Inject 22 Units into the skin once daily.    INV ASPIRIN 100MG/PLACEBO Take 1 capsul (100 ) mg by mouth once daily. FOR INVESTIGATIONAL USE ONLY. Protocol: GASTON III subject: [id number]    lancets 33 gauge Misc Use to test blood glucose 4 (four) times daily.    levETIRAcetam (KEPPRA) 1000 MG tablet Take 1 tablet (1,000 mg total) by mouth 2 (two) times daily.    magnesium oxide (MAG-OX) 400 mg (241.3 mg magnesium) tablet Take 2 tablets (800 mg total) by mouth 2 (two) times daily.    milrinone (PRIMACOR) 1 mg/mL injection Inject into the vein.    milrinone 20mg/100ml D5W, 200mcg/ml, (PRIMACOR) 20 mg/100 mL (200 mcg/mL) infusion Inject 34.875 mcg/min into the vein continuous.    mirtazapine (REMERON) 15 MG tablet Take 15 mg by mouth nightly.    omega-3 acid ethyl esters (LOVAZA) 1 gram capsule Take 2 capsules (2 g total) by mouth 2 (two) times daily.    pen needle, diabetic 31 gauge x 1/4" Ndle 1 Device by Misc.(Non-Drug; Combo Route) route 4 (four) times daily.    potassium chloride SA (K-DUR,KLOR-CON) 20 MEQ tablet Take 2 tablets (40 mEq total) by mouth 3 (three) times daily.    senna-docusate 8.6-50 mg (SENNA-S) 8.6-50 mg per tablet Take 2 tablets by mouth once daily.    spironolactone " (ALDACTONE) 25 MG tablet Take 1 tablet (25 mg total) by mouth once daily.    tiZANidine (ZANAFLEX) 2 MG tablet Take 1 tablet (2 mg total) by mouth every 8 (eight) hours as needed (muscle cramps).    warfarin (COUMADIN) 3 MG tablet Take 1 tablet (3 mg total) by mouth Daily.     Family History       Problem Relation (Age of Onset)    Diverticulosis Brother    Heart attack Maternal Grandmother, Maternal Grandfather    Heart failure Father          Tobacco Use    Smoking status: Former     Packs/day: 0.50     Years: 16.00     Pack years: 8.00     Types: Cigarettes     Start date: 10/1/2004     Quit date: 2021     Years since quittin.3    Smokeless tobacco: Never   Substance and Sexual Activity    Alcohol use: Not Currently    Drug use: Not Currently     Types: Marijuana, MDMA (Ecstacy)    Sexual activity: Yes     Partners: Female     Birth control/protection: None     Review of Systems   Constitutional:  Negative for appetite change, chills and fever.   Respiratory:  Negative for shortness of breath and wheezing.    Cardiovascular:  Negative for chest pain, palpitations and leg swelling.   Gastrointestinal:  Negative for abdominal distention and abdominal pain.   Neurological:  Positive for syncope and light-headedness. Negative for facial asymmetry and headaches.   Objective:     Vital Signs (Most Recent):  Temp: 97.7 °F (36.5 °C) (22 1200)  Pulse: (!) 124 (22 1405)  Resp: (!) 24 (22 1405)  BP: (!) 73/0 (22 1200)  SpO2: 99 % (22 1405)   Vital Signs (24h Range):  Temp:  [97.6 °F (36.4 °C)-97.7 °F (36.5 °C)] 97.7 °F (36.5 °C)  Pulse:  [] 124  Resp:  [18-24] 24  SpO2:  [96 %-99 %] 99 %  BP: (72-73)/(0) 73/0     Patient Vitals for the past 72 hrs (Last 3 readings):   Weight   22 1015 85.3 kg (188 lb)     Body mass index is 23.5 kg/m².    No intake or output data in the 24 hours ending 22 1647    Physical Exam  Constitutional:       Appearance: Normal appearance.    HENT:      Head: Normocephalic and atraumatic.   Eyes:      Pupils: Pupils are equal, round, and reactive to light.   Neck:      Vascular: No JVD.      Comments: No discernable JVD  Cardiovascular:      Rate and Rhythm: Normal rate and regular rhythm.      Pulses: Normal pulses.      Heart sounds: Normal heart sounds.      Comments: VAD hum.   Pulmonary:      Effort: Pulmonary effort is normal. No respiratory distress.      Breath sounds: Normal breath sounds. No wheezing or rales.   Abdominal:      General: Bowel sounds are normal. There is no distension.      Palpations: Abdomen is soft.      Tenderness: There is no abdominal tenderness.      Comments: DLES dressing c/d/i   Musculoskeletal:         General: Normal range of motion.      Cervical back: Normal range of motion and neck supple.      Right lower leg: No edema.      Left lower leg: No edema.   Skin:     General: Skin is warm and dry.      Capillary Refill: Capillary refill takes less than 2 seconds.   Neurological:      General: No focal deficit present.      Mental Status: He is alert and oriented to person, place, and time.   Psychiatric:         Mood and Affect: Mood normal.         Behavior: Behavior normal.       Significant Labs:  CBC:  Recent Labs   Lab 09/01/22 0930 09/01/22  1039   WBC 11.14 10.19   RBC 4.94 4.91   HGB 12.6* 12.4*   HCT 39.7* 38.6*    249   MCV 80* 79*   MCH 25.5* 25.3*   MCHC 31.7* 32.1     BNP:  Recent Labs   Lab 09/01/22  0930   BNP 33     CMP:  Recent Labs   Lab 09/01/22 0930 09/01/22  1039   * 235*   CALCIUM 9.9 9.7   ALBUMIN 3.8 3.8   PROT 8.7* 8.9*   * 134*   K 3.7 4.5   CO2 25 22*   CL 99 100   BUN 17 17   CREATININE 1.7* 1.6*   ALKPHOS 126 131   ALT 35 40   AST 38 57*   BILITOT 0.4 0.4      Coagulation:   Recent Labs   Lab 08/30/22  1545 09/01/22 0930 09/01/22  1039   INR 2.05* 2.0* 2.3*     LDH:  Recent Labs   Lab 09/01/22  0930        Microbiology:  Microbiology Results (last 7 days)        ** No results found for the last 168 hours. **            I have reviewed all pertinent labs within the past 24 hours.    Diagnostic Results:  I have reviewed and interpreted all pertinent imaging results/findings within the past 24 hours.

## 2022-09-01 NOTE — ASSESSMENT & PLAN NOTE
37 y.o. male with [facility] s/p LVAD (6/2022) presents after syncope vs. seizure event in parking garage this morning. Pt reports he felt a warning funny sensation in head then lost consciousness, fell backwards hitting his head on the concrete. He proceeded to his scheduled Cards appt and was back at cognitive baseline. LVAD interrogated without associated alarms, settings not adjusted. He was advised to present to ED where CTH without contrast was unremarkable for acute intracranial pathology. He is currently taking Keppra 1 g BID, which was started during LVAD admission for recurrent episodes of loss of awareness with L TIRDA on EEG and encephalomalacia on imaging. Recently seen by Dr. Devi who considered EMU admission in October for event characterization; however, patient unable to be admitted to the EMU service given continuous milrinone infusion.     Recommendations:  --admitted to Cards service, can hook up to LTM EEG while admitted  --continue Keppra 1 g BID  --check levetiracetam level  --please discontinue tramadol and other medications that lower seizure threshold  Patient previously taking this on a daily basis  --continue correction of electrolyte derangements

## 2022-09-01 NOTE — ASSESSMENT & PLAN NOTE
Patient syncopized in the parking garage today and hit the back of his head on concrete floor.  - CTH negative  - Patient with history of seizures so will get 24 hour EEG and consult neurology.   - Device interrogation with [employer]  - Interrogation of VAD function within normal limits without alarms or low flows.

## 2022-09-01 NOTE — SUBJECTIVE & OBJECTIVE
Past Medical History:   Diagnosis Date    Arthritis     Cardiomyopathy     CHF (congestive heart failure) 10/01/2020    Diabetes mellitus     Dilated cardiomyopathy 10/26/2020    Drug abuse 10/2020    Hyperosmolar hyperglycemic state (HHS) 5/25/2022    ICD (implantable cardioverter-defibrillator) in place 10/26/2020    Muscle cramping 6/15/2022    Renal disorder      Past Surgical History:   Procedure Laterality Date    APPLICATION OF WOUND VACUUM-ASSISTED CLOSURE DEVICE N/A 6/30/2022    Procedure: APPLICATION, WOUND VAC;  Surgeon: Luis F Paige MD;  Location: [facility] OR [address] [place] [address];  Service: Cardiovascular;  Laterality: N/A;  50 x 5 cm    CARDIAC DEFIBRILLATOR PLACEMENT      IMPLANTATION OF RIGHT VENTRICULAR ASSIST DEVICE (RVAD) N/A 6/29/2022    Procedure: INSERTION, RVAD;  Surgeon: Luis F Paige MD;  [facility];  Service: Cardiovascular;  Laterality: N/A;    INSERTION OF GRAFT TO PERICARDIUM Right 6/30/2022    Procedure: INSERTION-RIGHT VENTRICULAR ASSIST DEVICE;  Surgeon: Luis F Paige MD;  [address]  Service: Cardiovascular;  Laterality: Right;    IRRIGATION OF MEDIASTINUM  6/30/2022    Procedure: IRRIGATION, MEDIASTINUM;  Surgeon: Luis F Paige MD;  [facility];  Service: Cardiovascular;;    LEFT VENTRICULAR ASSIST DEVICE Left 6/23/2022    Procedure: INSERTION-LEFT VENTRICULAR ASSIST DEVICE;  Surgeon: Luis F Paige MD;  [facility];  Service: Cardiovascular;  Laterality: Left;    LEFT VENTRICULAR ASSIST DEVICE N/A 6/29/2022    Procedure: INSERTION-LEFT VENTRICULAR ASSIST DEVICE;  Surgeon: Luis F Paige MD;  [facility];  Service: Cardiovascular;  Laterality: N/A;    RIGHT HEART CATHETERIZATION Right 4/8/2022    Procedure: INSERTION, CATHETER, RIGHT HEART;  Surgeon: Luca Lopez Jr., MD;  Location: [facility] CATH LAB;  Service: Cardiology;  Laterality: Right;    RIGHT HEART CATHETERIZATION Right 4/19/2022    Procedure: INSERTION,  CATHETER, RIGHT HEART;  Surgeon: Josh Pulido MD;  Location: [facility] CATH LAB;  Service: Cardiology;  Laterality: Right;    RIGHT HEART CATHETERIZATION Right 7/21/2022    Procedure: INSERTION, CATHETER, RIGHT HEART;  Surgeon: Dalia Crum MD;  Location: [facility] CATH LAB;  Service: Cardiology;  Laterality: Right;    STERNAL WOUND CLOSURE N/A 6/30/2022    Procedure: CLOSURE, WOUND, STERNUM;  Surgeon: Luis F Paige MD;  Location: [facility] OR [facility];  Service: Cardiovascular;  Laterality: N/A;     Review of patient's allergies indicates:   Allergen Reactions    Aspirin Other (See Comments)     Mr. Thacker is enrolled in Dr. Paige's Alon Trial and cannot have any aspirin and/or aspirin-containing products. DO NOT cancel any orders for INV Aspirin 100 mg/Placebo. If you have questions, please contact Isabel @ [phone], [phone],[email or web address], secure chat or MS Teams message.    Bumex [bumetanide] Hives    Lactose Diarrhea     Other reaction(s): Abdominal distension, gaseous    Torsemide Hives     No current facility-administered medications on file prior to encounter.     Current Outpatient Medications on File Prior to Encounter   Medication Sig    albuterol (PROVENTIL/VENTOLIN HFA) 90 mcg/actuation inhaler INHALE 2 PUFFS BY MOUTH EVERY 4 HOURS AS NEEDED FOR COUGH OR WHEEZING    blood sugar diagnostic Strp Use to test blood glucose 4 (four) times daily.    blood-glucose meter Misc Use as instructed    busPIRone (BUSPAR) 7.5 MG tablet Take 1 tablet (7.5 mg total) by mouth once daily at 6am.    famotidine (PEPCID) 40 MG tablet Take 1 tablet (40 mg total) by mouth once daily.    ferrous gluconate 324 mg (37.5 mg iron) Tab tablet Take 1 tablet (324 mg total) by mouth daily with breakfast.    furosemide (LASIX) 80 MG tablet Take 1 tablet (80 mg total) by mouth once daily.    gabapentin (NEURONTIN) 100 MG capsule Take 1 capsule (100 mg total) by mouth 3 (three) times daily.    insulin aspart U-100 (NOVOLOG)  "100 unit/mL (3 mL) InPn pen Inject 10 Units into the skin 3 (three) times daily.    insulin detemir U-100 (LEVEMIR FLEXTOUCH) 100 unit/mL (3 mL) SubQ InPn pen Inject 22 Units into the skin once daily.    INV ASPIRIN 100MG/PLACEBO Take 1 capsul (100 ) mg by mouth once daily. FOR INVESTIGATIONAL USE ONLY. Protocol: GASTON III subject: [id number]    lancets 33 gauge Misc Use to test blood glucose 4 (four) times daily.    levETIRAcetam (KEPPRA) 1000 MG tablet Take 1 tablet (1,000 mg total) by mouth 2 (two) times daily.    magnesium oxide (MAG-OX) 400 mg (241.3 mg magnesium) tablet Take 2 tablets (800 mg total) by mouth 2 (two) times daily.    milrinone (PRIMACOR) 1 mg/mL injection Inject into the vein.    milrinone 20mg/100ml D5W, 200mcg/ml, (PRIMACOR) 20 mg/100 mL (200 mcg/mL) infusion Inject 34.875 mcg/min into the vein continuous.    mirtazapine (REMERON) 15 MG tablet Take 15 mg by mouth nightly.    omega-3 acid ethyl esters (LOVAZA) 1 gram capsule Take 2 capsules (2 g total) by mouth 2 (two) times daily.    pen needle, diabetic 31 gauge x 1/4" Ndle 1 Device by Misc.(Non-Drug; Combo Route) route 4 (four) times daily.    potassium chloride SA (K-DUR,KLOR-CON) 20 MEQ tablet Take 2 tablets (40 mEq total) by mouth 3 (three) times daily.    senna-docusate 8.6-50 mg (SENNA-S) 8.6-50 mg per tablet Take 2 tablets by mouth once daily.    spironolactone (ALDACTONE) 25 MG tablet Take 1 tablet (25 mg total) by mouth once daily.    tiZANidine (ZANAFLEX) 2 MG tablet Take 1 tablet (2 mg total) by mouth every 8 (eight) hours as needed (muscle cramps).    warfarin (COUMADIN) 3 MG tablet Take 1 tablet (3 mg total) by mouth Daily.    [DISCONTINUED] digoxin (LANOXIN) 125 mcg tablet Take 1 tablet (0.125 mg total) by mouth once daily.    [DISCONTINUED] EScitalopram oxalate (LEXAPRO) 5 MG Tab Take 1 tablet (5 mg total) by mouth once daily.    [DISCONTINUED] insulin (LANTUS SOLOSTAR U-100 INSULIN) glargine 100 units/mL (3mL) SubQ pen Inject " 10 Units into the skin every evening. (Patient not taking: Reported on 2022)    [DISCONTINUED] metOLazone (ZAROXOLYN) 2.5 MG tablet Take 2.5 mg by mouth every other day.    [DISCONTINUED] metoprolol succinate (TOPROL-XL) 25 MG 24 hr tablet TAKE 1 TABLET(25 MG) BY MOUTH EVERY DAY    [DISCONTINUED] pantoprazole (PROTONIX) 40 MG tablet Take 1 tablet (40 mg total) by mouth once daily.    [DISCONTINUED] traMADoL (ULTRAM) 50 mg tablet Take 1 tablet (50 mg total) by mouth every 6 (six) hours as needed for Pain.     Family History       Problem Relation (Age of Onset)    Diverticulosis Brother    Heart attack Maternal Grandmother, Maternal Grandfather    Heart failure Father          Tobacco Use    Smoking status: Former     Packs/day: 0.50     Years: 16.00     Pack years: 8.00     Types: Cigarettes     Start date: 10/1/2004     Quit date: 2021     Years since quittin.3    Smokeless tobacco: Never   Substance and Sexual Activity    Alcohol use: Not Currently    Drug use: Not Currently     Types: Marijuana, MDMA (Ecstacy)    Sexual activity: Yes     Partners: Female     Birth control/protection: None     Review of Systems   Constitutional:  Positive for activity change and fatigue. Negative for fever.   HENT:  Negative for trouble swallowing and voice change.    Eyes:  Negative for visual disturbance.   Respiratory:  Positive for chest tightness and shortness of breath.    Gastrointestinal:  Negative for vomiting.   Musculoskeletal:  Negative for gait problem and neck stiffness.   Neurological:  Positive for seizures and syncope. Negative for speech difficulty and weakness.   Psychiatric/Behavioral:  Positive for sleep disturbance.    Objective:     Vital Signs (Most Recent):  Temp: 97.7 °F (36.5 °C) (22 1200)  Pulse: (!) 124 (22 1405)  Resp: (!) 24 (22 1405)  BP: (!) 73/0 (22 1200)  SpO2: 99 % (22 1405)   Vital Signs (24h Range):  Temp:  [97.6 °F (36.4 °C)-97.7 °F (36.5 °C)] 97.7  °F (36.5 °C)  Pulse:  [] 124  Resp:  [18-24] 24  SpO2:  [96 %-99 %] 99 %  BP: (72-73)/(0) 73/0     Weight: 85.3 kg (188 lb)  Body mass index is 23.5 kg/m².    Physical Exam  Eyes:      Extraocular Movements: EOM normal.   Neurological:      Mental Status: He is oriented to person, place, and time.      Motor: Motor strength is normal.      Coordination: Finger-Nose-Finger Test normal.   Psychiatric:         Speech: Speech normal.     NEUROLOGICAL EXAMINATION:     MENTAL STATUS   Oriented to person, place, and time.   Follows 2 step commands.   Attention: normal. Concentration: normal.   Speech: speech is normal   Level of consciousness: alert  Knowledge: good.   Normal comprehension.     CRANIAL NERVES     CN III, IV, VI   Extraocular motions are normal.   Nystagmus: none   Diplopia: none  Ophthalmoparesis: none    CN V   Facial sensation intact.     CN VII   Facial expression full, symmetric.     CN VIII   Hearing: intact    CN IX, X   Palate: symmetric    CN XI   CN XI normal.     CN XII   CN XII normal.        Anisocoria present (1 mm difference) either physiologic vs. Adie pupil     MOTOR EXAM   Muscle bulk: normal  Overall muscle tone: normal    Strength   Strength 5/5 throughout.     SENSORY EXAM        Diminished sensation L fingers (1-3rd digits) and L foot      GAIT AND COORDINATION     Gait  Gait: (deferred)     Coordination   Finger to nose coordination: normal    Tremor   Resting tremor: absent    Significant Labs: All pertinent lab results from the past 24 hours have been reviewed.    Significant Imaging: I have reviewed all pertinent imaging results/findings within the past 24 hours.

## 2022-09-01 NOTE — CARE UPDATE
BG goal 140 - 180   Diet Cardiac [facility]; Fluid - 1500mL       Consult acknowledged, chart reviewed, and orders placed. Expect full consult note to follow 09/02/2022. Endocrinology will continue to follow, and manage glycemic control while inpatient.     - Start Levemir 20 units HS. Slight reduction from home dosing.   - Start Novolog 8 units TIDWM. Slight reduction from home dosing.   - Moderate Dose SQ Insulin Correction Scale.  - BG Monitoring AC/HS     ** Please call Endocrine for any BG related issues **  ** Please notify Endocrine for any change and/or advance in diet**    Lab Results   Component Value Date    HGBA1C 9.2 (H) 05/20/2022     Discharge Planning:   TBD. Please notify endocrinology prior to discharge.

## 2022-09-01 NOTE — HPI
"Reason for Consult: Management of T2DM, Hyperglycemia     Surgical Procedure and Date: LVAD 06/29/2022    Diabetes diagnosis year: 2022    Home Diabetes Medications:    Levemir 22 units HS.   Novolog 10 units TIDWM.     How often checking glucose at home? 1-3 x day   BG readings on regimen: 100's - 200s  Hypoglycemia on the regimen?  No  Missed doses on regimen?  No    Diabetes Complications include:     Hyperglycemia     Complicating diabetes co morbidities:   History of MI, CHF, and CKD      HPI:   Patient is a 37 y.o. 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 He also completed a course of IV Abx for staph epi. He was weaned off  but he had to restarted due to RVF. He was eventually transitioned to milrinone (secondary to  shortage) now on 0.25 mcg/kg/min. He underwent echo this AM and was seen in [facility] clinic today. He did undergo his echo this am however he just informed us that while on his way tot  clinic he had a seizure episode and hit his head. CT scan in the ED negative for intracranial hemorrhage. He reports being lightheaded before losing consciousness and being confused upon waking. He also reports a similar episode earlier this week when he was eating out at restaurant and he was reported by his wfe to "doze off" and not remember and he also was confused upon waking from that episode as well. Denies biting his tongue. VAD speed is at 5100 rpm. No VAD alarms noted on interrogation occasional PI events . BP is 72 mm of Hg. DLES is a "1". INR is therapeutic at 2.05. LDh is at baseline. Endocrinology consulted on 09/01/2022 to " manage glycemic control.     Lab Results   Component Value Date    HGBA1C 9.2 (H) 05/20/2022

## 2022-09-01 NOTE — ED NOTES
Patient identifiers for Kevan Queen 37 y.o. male checked and correct.  Chief Complaint   Patient presents with    Seizures     Pt reports he possible had a seizure. Fell and hit the back of his head. Pt has a hx of seizures. LVAD patient.      Past Medical History:   Diagnosis Date    Arthritis     Cardiomyopathy     CHF (congestive heart failure) 10/01/2020    Diabetes mellitus     Dilated cardiomyopathy 10/26/2020    Drug abuse 10/2020    Hyperosmolar hyperglycemic state (HHS) 5/25/2022    ICD (implantable cardioverter-defibrillator) in place 10/26/2020    Muscle cramping 6/15/2022    Renal disorder      Allergies reported:   Review of patient's allergies indicates:   Allergen Reactions    Aspirin Other (See Comments)     Mr. Thacker is enrolled in Dr. Paige's Alon Trial and cannot have any aspirin and/or aspirin-containing products. DO NOT cancel any orders for INV Aspirin 100 mg/Placebo. If you have questions, please contact Isabel @ [phone], [phone],[email or web address], secure chat or MS Teams message.    Bumex [bumetanide] Hives    Lactose Diarrhea     Other reaction(s): Abdominal distension, gaseous    Torsemide Hives         LOC: Patient is awake, alert, and aware of environment with an appropriate affect. Patient is oriented x 4 and speaking appropriately.  APPEARANCE: Patient resting comfortably and in no acute distress. Patient is clean and well groomed, patient's clothing is properly fastened.  HEENT: Pt presents with surgical mask on.   SKIN: The skin is warm and dry. Patient has normal skin turgor and moist mucus membranes. LVAD line dressing CDI.  MUSKULOSKELETAL: Patient is moving all extremities well, no obvious deformities noted. Pulses intact.   RESPIRATORY: Airway is open and patent. Respirations are spontaneous and non-labored with normal effort and rate.  CARDIAC: Patient HR 74 on cardiac monitor, doppler pulse found in left AC and right wrist. No peripheral edema noted.   ABDOMEN:  No distention noted. Soft and non-tender upon palpation.  NEUROLOGICAL: pupils 3mm, PERRL. Facial expression is symmetrical. Hand grasps are equal bilaterally. Normal sensation in all extremities when touched with finger.

## 2022-09-01 NOTE — HPI
"Mr. 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 He also completed a course of IV Abx for staph epi. He was weaned off  but he had to restarted due to RVF. He was eventually transitioned to milrinone (secondary to  shortage) now on 0.25 mcg/kg/min. He underwent echo this AM and was seen in [facility] clinic today. He did undergo his echo this am however he just informed us that while on his way tot  clinic he had a seizure episode and hit his head. CT scan in the ED negative for intracranial hemorrhage. He reports being lightheaded before losing consciousness and being confused upon waking. He also reports a similar episode earlier this week when he was eating out at restaurant and he was reported by his wfe to "doze off" and not remember and he also was confused upon waking from that episode as well. Denies biting his tongue. VAD speed is at 5100 rpm. No VAD alarms noted on interrogation occasional PI events . BP is 72 mm of Hg. DLES is a "1". INR is therapeutic at 2.05. LDh is at baseline.   "

## 2022-09-01 NOTE — H&P
"[facility] - Emergency Dept  Heart Transplant  H&P    Patient Name: Kevan Queen  MRN: [id number]  Admission Date: 9/1/2022  Attending Physician: Otoniel Mallory MD  Primary Care Provider: ORALIA Cline  Principal Problem:<principal problem not specified>    Subjective:     History of Present Illness:  Mr. 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 He also completed a course of IV Abx for staph epi. He was weaned off  but he had to restarted due to RVF. He was eventually transitioned to milrinone (secondary to  shortage) now on 0.25 mcg/kg/min. He underwent echo this AM and was seen in [facility] clinic today. He did undergo his echo this am however he just informed us that while on his way tot  clinic he had a seizure episode and hit his head. CT scan in the ED negative for intracranial hemorrhage. He reports being lightheaded before losing consciousness and being confused upon waking. He also reports a similar episode earlier this week when he was eating out at restaurant and he was reported by his wfe to "doze off" and not remember and he also was confused upon waking from that episode as well. Denies biting his tongue. VAD speed is at 5100 rpm. No VAD alarms noted on interrogation occasional PI events . BP is 72 mm of Hg. DLES is a "1". INR is therapeutic at 2.05. LDh is at baseline.       Past Medical History:   Diagnosis Date    Arthritis     Cardiomyopathy     CHF (congestive heart failure) 10/01/2020    Diabetes mellitus     Dilated cardiomyopathy 10/26/2020    Drug abuse 10/2020    Hyperosmolar hyperglycemic state (HHS) " 5/25/2022    ICD (implantable cardioverter-defibrillator) in place 10/26/2020    Muscle cramping 6/15/2022    Renal disorder        Past Surgical History:   Procedure Laterality Date    APPLICATION OF WOUND VACUUM-ASSISTED CLOSURE DEVICE N/A 6/30/2022    Procedure: APPLICATION, WOUND VAC;  Surgeon: Luis F Paige MD;  [facility];  Service: Cardiovascular;  Laterality: N/A;  50 x 5 cm    CARDIAC DEFIBRILLATOR PLACEMENT      IMPLANTATION OF RIGHT VENTRICULAR ASSIST DEVICE (RVAD) N/A 6/29/2022    Procedure: INSERTION, RVAD;  Surgeon: Luis F Paige MD;  [facility];  Service: Cardiovascular;  Laterality: N/A;    INSERTION OF GRAFT TO PERICARDIUM Right 6/30/2022    Procedure: INSERTION-RIGHT VENTRICULAR ASSIST DEVICE;  Surgeon: Luis F Paige MD;  [facility];  Service: Cardiovascular;  Laterality: Right;    IRRIGATION OF MEDIASTINUM  6/30/2022    Procedure: IRRIGATION, MEDIASTINUM;  Surgeon: Luis F Paige MD;  [facility];  Service: Cardiovascular;;    LEFT VENTRICULAR ASSIST DEVICE Left 6/23/2022    Procedure: INSERTION-LEFT VENTRICULAR ASSIST DEVICE;  Surgeon: Luis F Paige MD;  [facility];  Service: Cardiovascular;  Laterality: Left;    LEFT VENTRICULAR ASSIST DEVICE N/A 6/29/2022    Procedure: INSERTION-LEFT VENTRICULAR ASSIST DEVICE;  Surgeon: Luis F Paige MD;  [facility];  Service: Cardiovascular;  Laterality: N/A;    RIGHT HEART CATHETERIZATION Right 4/8/2022    Procedure: INSERTION, CATHETER, RIGHT HEART;  Surgeon: Luca Lopez Jr., MD;  Location: [facility] CATH LAB;  Service: Cardiology;  Laterality: Right;    RIGHT HEART CATHETERIZATION Right 4/19/2022    Procedure: INSERTION, CATHETER, RIGHT HEART;  Surgeon: Josh Pulido MD;  Location: [facility] CATH LAB;  Service: Cardiology;  Laterality: Right;    RIGHT HEART CATHETERIZATION Right 7/21/2022    Procedure: INSERTION, CATHETER, RIGHT HEART;  Surgeon: Dalia MORALEZ  MD Skyla;  Location: [facility] CATH LAB;  Service: Cardiology;  Laterality: Right;    STERNAL WOUND CLOSURE N/A 6/30/2022    Procedure: CLOSURE, WOUND, STERNUM;  Surgeon: Luis F Paige MD;  Location: [facility] OR [address] [place] [address];  Service: Cardiovascular;  Laterality: N/A;       Review of patient's allergies indicates:   Allergen Reactions    Aspirin Other (See Comments)     Mr. Thacker is enrolled in Dr. Paige's Alon Trial and cannot have any aspirin and/or aspirin-containing products. DO NOT cancel any orders for INV Aspirin 100 mg/Placebo. If you have questions, please contact Isabel @ [phone], [phone],[email or web address], secure chat or MS Teams message.    Bumex [bumetanide] Hives    Lactose Diarrhea     Other reaction(s): Abdominal distension, gaseous    Torsemide Hives       Current Facility-Administered Medications   Medication    albuterol inhaler 2 puff    [START ON 9/2/2022] busPIRone split tablet 7.5 mg    famotidine tablet 40 mg    [START ON 9/2/2022] ferrous gluconate tablet 324 mg    furosemide tablet 80 mg    gabapentin capsule 100 mg    INV ASPIRIN 100MG/PLACEBO 100 mg    levETIRAcetam tablet 1,000 mg    magnesium oxide tablet 800 mg    milrinone 20mg in D5W 100 mL infusion    mirtazapine tablet 15 mg    omega-3 acid ethyl esters capsule 2 g    potassium chloride SA CR tablet 40 mEq    senna-docusate 8.6-50 mg per tablet 2 tablet    spironolactone tablet 25 mg    tiZANidine tablet 2 mg    [START ON 9/2/2022] warfarin tablet 3 mg     Current Outpatient Medications   Medication Sig    albuterol (PROVENTIL/VENTOLIN HFA) 90 mcg/actuation inhaler INHALE 2 PUFFS BY MOUTH EVERY 4 HOURS AS NEEDED FOR COUGH OR WHEEZING    blood sugar diagnostic Strp Use to test blood glucose 4 (four) times daily.    blood-glucose meter Misc Use as instructed    busPIRone (BUSPAR) 7.5 MG tablet Take 1 tablet (7.5 mg total) by mouth once daily at 6am.    famotidine (PEPCID) 40 MG tablet Take 1 tablet (40 mg total) by mouth once  "daily.    ferrous gluconate 324 mg (37.5 mg iron) Tab tablet Take 1 tablet (324 mg total) by mouth daily with breakfast.    furosemide (LASIX) 80 MG tablet Take 1 tablet (80 mg total) by mouth once daily.    gabapentin (NEURONTIN) 100 MG capsule Take 1 capsule (100 mg total) by mouth 3 (three) times daily.    insulin aspart U-100 (NOVOLOG) 100 unit/mL (3 mL) InPn pen Inject 10 Units into the skin 3 (three) times daily.    insulin detemir U-100 (LEVEMIR FLEXTOUCH) 100 unit/mL (3 mL) SubQ InPn pen Inject 22 Units into the skin once daily.    INV ASPIRIN 100MG/PLACEBO Take 1 capsul (100 ) mg by mouth once daily. FOR INVESTIGATIONAL USE ONLY. Protocol: GASTON III subject: [id number]    lancets 33 gauge Misc Use to test blood glucose 4 (four) times daily.    levETIRAcetam (KEPPRA) 1000 MG tablet Take 1 tablet (1,000 mg total) by mouth 2 (two) times daily.    magnesium oxide (MAG-OX) 400 mg (241.3 mg magnesium) tablet Take 2 tablets (800 mg total) by mouth 2 (two) times daily.    milrinone (PRIMACOR) 1 mg/mL injection Inject into the vein.    milrinone 20mg/100ml D5W, 200mcg/ml, (PRIMACOR) 20 mg/100 mL (200 mcg/mL) infusion Inject 34.875 mcg/min into the vein continuous.    mirtazapine (REMERON) 15 MG tablet Take 15 mg by mouth nightly.    omega-3 acid ethyl esters (LOVAZA) 1 gram capsule Take 2 capsules (2 g total) by mouth 2 (two) times daily.    pen needle, diabetic 31 gauge x 1/4" Ndle 1 Device by Misc.(Non-Drug; Combo Route) route 4 (four) times daily.    potassium chloride SA (K-DUR,KLOR-CON) 20 MEQ tablet Take 2 tablets (40 mEq total) by mouth 3 (three) times daily.    senna-docusate 8.6-50 mg (SENNA-S) 8.6-50 mg per tablet Take 2 tablets by mouth once daily.    spironolactone (ALDACTONE) 25 MG tablet Take 1 tablet (25 mg total) by mouth once daily.    tiZANidine (ZANAFLEX) 2 MG tablet Take 1 tablet (2 mg total) by mouth every 8 (eight) hours as needed (muscle cramps).    warfarin (COUMADIN) 3 MG tablet Take 1 " tablet (3 mg total) by mouth Daily.     Family History       Problem Relation (Age of Onset)    Diverticulosis Brother    Heart attack Maternal Grandmother, Maternal Grandfather    Heart failure Father          Tobacco Use    Smoking status: Former     Packs/day: 0.50     Years: 16.00     Pack years: 8.00     Types: Cigarettes     Start date: 10/1/2004     Quit date: 2021     Years since quittin.3    Smokeless tobacco: Never   Substance and Sexual Activity    Alcohol use: Not Currently    Drug use: Not Currently     Types: Marijuana, MDMA (Ecstacy)    Sexual activity: Yes     Partners: Female     Birth control/protection: None     Review of Systems   Constitutional:  Negative for appetite change, chills and fever.   Respiratory:  Negative for shortness of breath and wheezing.    Cardiovascular:  Negative for chest pain, palpitations and leg swelling.   Gastrointestinal:  Negative for abdominal distention and abdominal pain.   Neurological:  Positive for syncope and light-headedness. Negative for facial asymmetry and headaches.   Objective:     Vital Signs (Most Recent):  Temp: 97.7 °F (36.5 °C) (22 1200)  Pulse: (!) 124 (22 1405)  Resp: (!) 24 (22 1405)  BP: (!) 73/0 (22 1200)  SpO2: 99 % (22 1405)   Vital Signs (24h Range):  Temp:  [97.6 °F (36.4 °C)-97.7 °F (36.5 °C)] 97.7 °F (36.5 °C)  Pulse:  [] 124  Resp:  [18-24] 24  SpO2:  [96 %-99 %] 99 %  BP: (72-73)/(0) 73/0     Patient Vitals for the past 72 hrs (Last 3 readings):   Weight   22 1015 85.3 kg (188 lb)     Body mass index is 23.5 kg/m².    No intake or output data in the 24 hours ending 22 1647    Physical Exam  Constitutional:       Appearance: Normal appearance.   HENT:      Head: Normocephalic and atraumatic.   Eyes:      Pupils: Pupils are equal, round, and reactive to light.   Neck:      Vascular: No JVD.      Comments: No discernable JVD  Cardiovascular:      Rate and Rhythm: Normal rate and  regular rhythm.      Pulses: Normal pulses.      Heart sounds: Normal heart sounds.      Comments: VAD hum.   Pulmonary:      Effort: Pulmonary effort is normal. No respiratory distress.      Breath sounds: Normal breath sounds. No wheezing or rales.   Abdominal:      General: Bowel sounds are normal. There is no distension.      Palpations: Abdomen is soft.      Tenderness: There is no abdominal tenderness.      Comments: DLES dressing c/d/i   Musculoskeletal:         General: Normal range of motion.      Cervical back: Normal range of motion and neck supple.      Right lower leg: No edema.      Left lower leg: No edema.   Skin:     General: Skin is warm and dry.      Capillary Refill: Capillary refill takes less than 2 seconds.   Neurological:      General: No focal deficit present.      Mental Status: He is alert and oriented to person, place, and time.   Psychiatric:         Mood and Affect: Mood normal.         Behavior: Behavior normal.       Significant Labs:  CBC:  Recent Labs   Lab 09/01/22 0930 09/01/22  1039   WBC 11.14 10.19   RBC 4.94 4.91   HGB 12.6* 12.4*   HCT 39.7* 38.6*    249   MCV 80* 79*   MCH 25.5* 25.3*   MCHC 31.7* 32.1     BNP:  Recent Labs   Lab 09/01/22  0930   BNP 33     CMP:  Recent Labs   Lab 09/01/22 0930 09/01/22  1039   * 235*   CALCIUM 9.9 9.7   ALBUMIN 3.8 3.8   PROT 8.7* 8.9*   * 134*   K 3.7 4.5   CO2 25 22*   CL 99 100   BUN 17 17   CREATININE 1.7* 1.6*   ALKPHOS 126 131   ALT 35 40   AST 38 57*   BILITOT 0.4 0.4      Coagulation:   Recent Labs   Lab 08/30/22  1545 09/01/22  0930 09/01/22  1039   INR 2.05* 2.0* 2.3*     LDH:  Recent Labs   Lab 09/01/22  0930        Microbiology:  Microbiology Results (last 7 days)       ** No results found for the last 168 hours. **            I have reviewed all pertinent labs within the past 24 hours.    Diagnostic Results:  I have reviewed and interpreted all pertinent imaging results/findings within the past 24  hours.    Assessment/Plan:     Syncope  Patient syncopized in the parking garage today and hit the back of his head on concrete floor.  - CTH negative  - Patient with history of seizures so will get 24 hour EEG and consult neurology.   - Device interrogation with [employer]  - Interrogation of VAD function within normal limits without alarms or low flows.     History of Temporal Lobe seizures.       Continue Keppra 1000 mg Bid       Get 24 hour EEG and consult epilepsy team for recommendations.     LVAD (left ventricular assist device) present  Procedure: Device Interrogation Including analysis of device parameters  Current Settings: Ventricular Assist Device  INR therapeutic at 2.3. Reports taking coumadin in the AM and took his dose already today so will resume home dose tomorrow.   Review of device function is stable/unstable stable    TXP LVAD INTERROGATIONS 9/1/2022 8/22/2022 8/18/2022 8/4/2022 7/27/2022 7/27/2022 7/27/2022   Type - - - - HeartMate3 HeartMate3 HeartMate3   Flow - - - - 4.2 3.8 3.8   Speed - - - - 5100 5100 5100   PI - - - - 4.6 4.8 5.3   Power (Serna) - - - - 3.6 3.6 3.8   LSL - - - - 4700 4700 4700   Pulsatility No Pulse No Pulse Intermittent pulse No Pulse Intermittent pulse Intermittent pulse Intermittent pulse           Jeff Spencer PA-C  Heart Transplant  [facility] - Emergency Dept

## 2022-09-01 NOTE — LETTER
September 1, 2022        Inderjit Hendricks  [address]  [address]  [place] [address]  Phone: [phone]  Fax: [phone]     Josh Pulido  [address] [facility] [address]  Phone: [phone]  Fax: [phone]             [facility]  [address]  [place] [address]  Phone: [phone]   Patient: Kevan Queen   MR Number: [id number]   YOB: 1985   Date of Visit: 9/1/2022       Dear Dr. Inderjit Hendricks, Josh Pulido    Thank you for referring Kevan Queen to me for evaluation. Attached you will find relevant portions of my assessment and plan of care.    If you have questions, please do not hesitate to call me. I look forward to following Kevan Queen along with you.    Sincerely,    Natalya Villatoro MD    Enclosure    If you would like to receive this communication electronically, please contact [email or web address] or [phone] to request [employer] access.    [employer] is a tool which provides read-only access to select patient information with whom you have a relationship. Its easy to use and provides real time access to review your patients record including encounter summaries, notes, results, and demographic information.    If you feel you have received this communication in error or would no longer like to receive these types of communications, please e-mail [email or web address]

## 2022-09-01 NOTE — PROCEDURES
TXP JOY INTERROGATIONS 9/1/2022 8/22/2022 8/18/2022 8/4/2022 7/27/2022 7/27/2022 7/27/2022   Type HeartMate3 HeartMate3 HeartMate3 HeartMate3 - - -   Flow 3.5 3.2 3.4 3.4 - - -   Speed 5100 5100 5100 5100 - - -   PI 7.4 7.3 8.2 4.7 - - -   Power (Serna) 3.7 3.7 3.8 3.5 - - -   LSL 4700 4700 4700 4700 - - -   Pulsatility No Pulse No Pulse Intermittent pulse No Pulse Intermittent pulse Intermittent pulse Intermittent pulse   }

## 2022-09-01 NOTE — PROGRESS NOTES
"Date of Implant with Heartmate 3 LVAD: 6/29/22    PATIENT ARRIVED IN CLINIC: w/c  Accompanied by: s/o  Complaints/reason for visit today: symptoms    Vitals  Temperature, oral:   Temp Readings from Last 1 Encounters:   09/01/22 97.6 °F (36.4 °C) (Oral)     Blood Pressure:   BP Readings from Last 3 Encounters:   09/01/22 (!) 72/0   09/01/22 (!) 72/0   08/22/22 (!) 80/0        VAD Interrogation:  TXP JOY INTERROGATIONS 9/1/2022 8/22/2022 8/18/2022   Type HeartMate3 HeartMate3 HeartMate3   Flow 3.5 3.2 3.4   Speed 5100 5100 5100   PI 7.4 7.3 8.2   Power (Serna) 3.7 3.7 3.8   LSL 4700 4700 4700   Pulsatility No Pulse No Pulse Intermittent pulse     HCT:   Lab Results   Component Value Date    HCT 38.6 (L) 09/01/2022    HCT 29 (L) 07/23/2022       History Log: [id number]  Problems / Issues / Alarms with VAD if any: None noted  VAD Sounds: HM3 Smooth      Equipment:  Emergency Equipment With Patient: yes   Any Equipment Issues: None noted   It is medically necessary to ensure patient has properly functioning equipment and wearables to prevent infection, injury or death to patient.     DLES Assessment:  Appearance Of Driveline: "1"  Antibiotics: NO  Velour: no  Manual & Visual Inspection Of Driveline: No kinks or tears noted  Stabilization Device In Use: yes, melendez securement device    Heartmate 3 Module Cable:  No yellow exposed and Attempted to unscrew modular cable to ensure it will be able to come lose in the event we ever need to change the modular cable while patient held the driveline in place so it would not move. Modular cable connection able to be unscrewed and re-tightened. Instructed pt to perform this weekly.    Patient MyChart Questionnaire: No flowsheet data found.     Assessment:   PAIN: NO  Complaints Of Nausea / Vomiting: None noted    Appearance and Frequency Of Stools: normal and formed without blood & daily  Color Of Urine: clear/yellow  Coping/Depression/Anxiety: coping okay and anxious  Sleep " Habits: 5-6 hrs /night  Sleep Aids:  mirtazapine  Showering: No  Activity/Exercise: walking   Driving: No.    DLES Dressing Care:   Frequency of Dressing Changes: daily & daily kit  Pt In Need Of Management Kits?:no   It is medically necessary to have VAD management kits in order to prevent infection or to assist in the healing of an infected DLES.    Labs:    Chemistry        Component Value Date/Time     (L) 09/01/2022 1039    K 4.5 09/01/2022 1039     09/01/2022 1039    CO2 22 (L) 09/01/2022 1039    BUN 17 09/01/2022 1039    CREATININE 1.6 (H) 09/01/2022 1039     (H) 09/01/2022 1039        Component Value Date/Time    CALCIUM 9.7 09/01/2022 1039    ALKPHOS 131 09/01/2022 1039    AST 57 (H) 09/01/2022 1039    ALT 40 09/01/2022 1039    BILITOT 0.4 09/01/2022 1039    ESTGFRAFRICA >60.0 07/27/2022 1229    EGFRNONAA 54.2 (A) 07/27/2022 1229            Magnesium   Date Value Ref Range Status   09/01/2022 1.6 1.6 - 2.6 mg/dL Final       Lab Results   Component Value Date    WBC 10.19 09/01/2022    HGB 12.4 (L) 09/01/2022    HCT 38.6 (L) 09/01/2022    MCV 79 (L) 09/01/2022     09/01/2022       Lab Results   Component Value Date    INR 2.3 (H) 09/01/2022    INR 2.0 (H) 09/01/2022    INR 2.05 (H) 08/30/2022    PROTIME 22.7 (H) 08/30/2022    PROTIME 20.6 (H) 08/15/2022    PROTIME 14.6 (H) 09/23/2021       BNP   Date Value Ref Range Status   09/01/2022 33 0 - 99 pg/mL Final     Comment:     Values of less than 100 pg/ml are consistent with non-CHF populations.   08/22/2022 96 0 - 99 pg/mL Final     Comment:     Values of less than 100 pg/ml are consistent with non-CHF populations.   08/18/2022 80 0 - 99 pg/mL Final     Comment:     Values of less than 100 pg/ml are consistent with non-CHF populations.       LD   Date Value Ref Range Status   09/01/2022 234 110 - 260 U/L Final     Comment:     Results are increased in hemolyzed samples.   08/22/2022 312 (H) 110 - 260 U/L Final     Comment:      Results are increased in hemolyzed samples.   08/18/2022 314 (H) 110 - 260 U/L Final     Comment:     Results are increased in hemolyzed samples.       Medication reconciliation: per MA.  Medication Detail updated today: patient did not bring the card  Coumadin Managed by: [facility]    Education: Reviewed driveline care, emergency procedures, how to change the controller, alarms with patient, as well as discussed how to page the VAD coordinator in case of an emergency.   Covid - 19 education: Reminded patient/caregiver to check temperature daily and call us if it is > 99.0.  Reminded them  to stay 6 feet away from other people, wash hands frequently, don't touch your face and stay home except to get labs, medications, and appts.    Covid Vaccine: Pt informed that we are encouraging all VAD patients to receive the COVID vaccine.  Informed pt that they can take tylenol but should avoid other NSAIDs.      Plans/Needs: Routine f/u w/ Dr Villatoro. Patient reports feeling foggy, not really himself. Reports he either passed out or had a seizure getting out of the car today and hit his head. Escorting patient to ER for stat CTH.    RTC TBD after discharge from hospital       Hurricane Season: Yes, discussed with patient: With hurricane season approaching, we want to make sure you are fully prepared for any emergency.  Should the National Weather Service or your local authorities recommend a voluntary or mandatory evacuation of your area, The VAD team requires you to evacuate to a safe place.  Remember, when it is a mandatory evacuation, traffic will become an issue for your limited battery power.  Therefore, we strongly urge you to evacuate early.      The VAD team advises you to have the following in place before hurricane season:  Have an evacuation plan in place including places to evacuate, names and phone numbers.  This information is required to be given to the VAD coordinator.  Have your VAD emergency  contact numbers with you.  Make sure your prescriptions will not run out by the end of September.  Make sure you have enough medications, including pills, inhalers, patches,     etc. to take, should you be gone for more than 2 weeks.  Make sure ALL of your batteries are fully charged.  Bring enough dressing change supplies to last for at least 2 weeks.  Bring your VAD binder with you.  Make sure your binder is updated and complete with alarms reference card, patient hand book, emergency contact numbers, daily log sheets, etc.    If you do not have family or friends as an evacuation destination, we recommend evacuating to a safe area.   Do NOT evacuate to [facility].  The VAD team wants to stress the importance of planning for your evacuation in the event of a hurricane.  If you have any LVAD questions or issues, please contact the LVAD coordinator.

## 2022-09-01 NOTE — ED PROVIDER NOTES
Source of History   Patient    Chief Complaint   Seizures (Pt reports he possible had a seizure. Fell and hit the back of his head. Pt has a hx of seizures. LVAD patient. )      History Of Present Illness   Kevan Queen is a 37 y.o. male presenting with possible seizure versus syncope in the parking garage today.  The patient states he fell and hit the back of his head.  He notes recent history of seizures.  They started after he had his LVAD placed.  Currently, he feels fairly normal except for slight pain in the back of his head.  He was here to be admitted to the cardiology service    Review Of Systems   As per HPI and below:  General: No fever.  No chills.  Head: Notes headache.  Notes loss of consciousness.  Neck: No neck pain.  Back: No back pain.  Extremities: No extremity pain.  Chest: No shortness of breath.  No chest pain.  Cardiovascular: No palpitations.  Abdomen: No abdominal pain.  No nausea or vomiting.  Integument: No rashes or bruising.  Eyes: No visual changes.  Urinary: No hematuria.  Neurologic: No numbness.  No focal weakness.      Review of patient's allergies indicates:   Allergen Reactions    Aspirin Other (See Comments)     Mr. Thacker is enrolled in Dr. Paige's Alon Trial and cannot have any aspirin and/or aspirin-containing products. DO NOT cancel any orders for INV Aspirin 100 mg/Placebo. If you have questions, please contact Isabel @ [phone], [phone],[email or web address], secure chat or MS Teams message.    Bumex [bumetanide] Hives    Lactose Diarrhea     Other reaction(s): Abdominal distension, gaseous    Torsemide Hives       No current facility-administered medications on file prior to encounter.     Current Outpatient Medications on File Prior to Encounter   Medication Sig Dispense Refill    albuterol (PROVENTIL/VENTOLIN HFA) 90 mcg/actuation inhaler INHALE 2 PUFFS BY MOUTH EVERY 4 HOURS AS NEEDED FOR COUGH OR WHEEZING      blood sugar diagnostic Strp Use to test blood  "glucose 4 (four) times daily. 200 each 3    blood-glucose meter Misc Use as instructed 1 each 0    busPIRone (BUSPAR) 7.5 MG tablet Take 1 tablet (7.5 mg total) by mouth once daily at 6am. 30 tablet 11    famotidine (PEPCID) 40 MG tablet Take 1 tablet (40 mg total) by mouth once daily. 30 tablet 11    ferrous gluconate 324 mg (37.5 mg iron) Tab tablet Take 1 tablet (324 mg total) by mouth daily with breakfast. 30 tablet 11    furosemide (LASIX) 80 MG tablet Take 1 tablet (80 mg total) by mouth once daily. 30 tablet 11    gabapentin (NEURONTIN) 100 MG capsule Take 1 capsule (100 mg total) by mouth 3 (three) times daily. 90 capsule 11    insulin aspart U-100 (NOVOLOG) 100 unit/mL (3 mL) InPn pen Inject 10 Units into the skin 3 (three) times daily. 30 mL 3    insulin detemir U-100 (LEVEMIR FLEXTOUCH) 100 unit/mL (3 mL) SubQ InPn pen Inject 22 Units into the skin once daily. 18 mL 3    INV ASPIRIN 100MG/PLACEBO Take 1 capsul (100 ) mg by mouth once daily. FOR INVESTIGATIONAL USE ONLY. Protocol: [facility] III subject: [id number] 90 each 0    lancets 33 gauge Misc Use to test blood glucose 4 (four) times daily. 200 each 3    levETIRAcetam (KEPPRA) 1000 MG tablet Take 1 tablet (1,000 mg total) by mouth 2 (two) times daily. 60 tablet 11    magnesium oxide (MAG-OX) 400 mg (241.3 mg magnesium) tablet Take 2 tablets (800 mg total) by mouth 2 (two) times daily. 120 tablet 11    milrinone (PRIMACOR) 1 mg/mL injection Inject into the vein.      milrinone 20mg/100ml D5W, 200mcg/ml, (PRIMACOR) 20 mg/100 mL (200 mcg/mL) infusion Inject 34.875 mcg/min into the vein continuous.      mirtazapine (REMERON) 15 MG tablet Take 15 mg by mouth nightly.      omega-3 acid ethyl esters (LOVAZA) 1 gram capsule Take 2 capsules (2 g total) by mouth 2 (two) times daily. 360 capsule 3    pen needle, diabetic 31 gauge x 1/4" Ndle 1 Device by Misc.(Non-Drug; Combo Route) route 4 (four) times daily. 180 each 3    potassium chloride SA (K-DUR,KLOR-CON) 20 " MEQ tablet Take 2 tablets (40 mEq total) by mouth 3 (three) times daily. 180 tablet 11    senna-docusate 8.6-50 mg (SENNA-S) 8.6-50 mg per tablet Take 2 tablets by mouth once daily. 60 tablet 11    spironolactone (ALDACTONE) 25 MG tablet Take 1 tablet (25 mg total) by mouth once daily. 30 tablet 11    tiZANidine (ZANAFLEX) 2 MG tablet Take 1 tablet (2 mg total) by mouth every 8 (eight) hours as needed (muscle cramps). 90 tablet 0    warfarin (COUMADIN) 3 MG tablet Take 1 tablet (3 mg total) by mouth Daily. 30 tablet 11    [DISCONTINUED] digoxin (LANOXIN) 125 mcg tablet Take 1 tablet (0.125 mg total) by mouth once daily. 30 tablet 11    [DISCONTINUED] EScitalopram oxalate (LEXAPRO) 5 MG Tab Take 1 tablet (5 mg total) by mouth once daily. 90 tablet 3    [DISCONTINUED] insulin (LANTUS SOLOSTAR U-100 INSULIN) glargine 100 units/mL (3mL) SubQ pen Inject 10 Units into the skin every evening. (Patient not taking: Reported on 5/24/2022) 3 mL 11    [DISCONTINUED] metOLazone (ZAROXOLYN) 2.5 MG tablet Take 2.5 mg by mouth every other day.      [DISCONTINUED] metoprolol succinate (TOPROL-XL) 25 MG 24 hr tablet TAKE 1 TABLET(25 MG) BY MOUTH EVERY DAY 30 tablet 0    [DISCONTINUED] pantoprazole (PROTONIX) 40 MG tablet Take 1 tablet (40 mg total) by mouth once daily. 30 tablet 11    [DISCONTINUED] traMADoL (ULTRAM) 50 mg tablet Take 1 tablet (50 mg total) by mouth every 6 (six) hours as needed for Pain. 28 tablet 0       Past History   As per HPI and below:  Past Medical History:   Diagnosis Date    Arthritis     Cardiomyopathy     CHF (congestive heart failure) 10/01/2020    Diabetes mellitus     Dilated cardiomyopathy 10/26/2020    Drug abuse 10/2020    Hyperosmolar hyperglycemic state (HHS) 5/25/2022    ICD (implantable cardioverter-defibrillator) in place 10/26/2020    Muscle cramping 6/15/2022    Renal disorder      Past Surgical History:   Procedure Laterality Date    APPLICATION OF WOUND VACUUM-ASSISTED CLOSURE DEVICE N/A  6/30/2022    Procedure: APPLICATION, WOUND VAC;  Surgeon: Luis F Paige MD;  Location: [facility] OR 2ND FLR;  Service: Cardiovascular;  Laterality: N/A;  50 x 5 cm    CARDIAC DEFIBRILLATOR PLACEMENT      IMPLANTATION OF RIGHT VENTRICULAR ASSIST DEVICE (RVAD) N/A 6/29/2022    Procedure: INSERTION, RVAD;  Surgeon: Luis F Paige MD;  Location: [facility] OR [employer] FLR;  Service: Cardiovascular;  Laterality: N/A;    INSERTION OF GRAFT TO PERICARDIUM Right 6/30/2022    Procedure: INSERTION-RIGHT VENTRICULAR ASSIST DEVICE;  Surgeon: Luis F Paige MD;  [facility];  Service: Cardiovascular;  Laterality: Right;    IRRIGATION OF MEDIASTINUM  6/30/2022    Procedure: IRRIGATION, MEDIASTINUM;  Surgeon: Luis F Paige MD;  [facility];  Service: Cardiovascular;;    LEFT VENTRICULAR ASSIST DEVICE Left 6/23/2022    Procedure: INSERTION-LEFT VENTRICULAR ASSIST DEVICE;  Surgeon: Luis F Paige MD;  [facility];  Service: Cardiovascular;  Laterality: Left;    LEFT VENTRICULAR ASSIST DEVICE N/A 6/29/2022    Procedure: INSERTION-LEFT VENTRICULAR ASSIST DEVICE;  Surgeon: Luis F Paige MD;  [facility];  Service: Cardiovascular;  Laterality: N/A;    RIGHT HEART CATHETERIZATION Right 4/8/2022    Procedure: INSERTION, CATHETER, RIGHT HEART;  Surgeon: Luca Lopez Jr., MD;  Location: [facility] CATH LAB;  Service: Cardiology;  Laterality: Right;    RIGHT HEART CATHETERIZATION Right 4/19/2022    Procedure: INSERTION, CATHETER, RIGHT HEART;  Surgeon: Josh Pulido MD;  Location: [facility] CATH LAB;  Service: Cardiology;  Laterality: Right;    RIGHT HEART CATHETERIZATION Right 7/21/2022    Procedure: INSERTION, CATHETER, RIGHT HEART;  Surgeon: Dalia Crum MD;  Location: [facility] CATH LAB;  Service: Cardiology;  Laterality: Right;    STERNAL WOUND CLOSURE N/A 6/30/2022    Procedure: CLOSURE, WOUND, STERNUM;  Surgeon: Luis F Paige MD;  [facility];  Service: Cardiovascular;   "Laterality: N/A;       Social History     Socioeconomic History    Marital status: Legally    Tobacco Use    Smoking status: Former     Packs/day: 0.50     Years: 16.00     Pack years: 8.00     Types: Cigarettes     Start date: 10/1/2004     Quit date: 2021     Years since quittin.3    Smokeless tobacco: Never   Substance and Sexual Activity    Alcohol use: Not Currently    Drug use: Not Currently     Types: Marijuana, MDMA (Ecstacy)    Sexual activity: Yes     Partners: Female     Birth control/protection: None       Family History   Problem Relation Age of Onset    Heart failure Father     Diverticulosis Brother     Heart attack Maternal Grandmother     Heart attack Maternal Grandfather        Physical Exam     Vitals:    22 1015 22 1029 22 1200 22 1405   BP: (!) 72/0  (!) 73/0    BP Location:   Left arm    Patient Position:   Lying    Pulse: Comment: was not picking up 72 106 (!) 124   Resp: 18  (!) 21 (!) 24   Temp: 97.6 °F (36.4 °C)  97.7 °F (36.5 °C)    TempSrc: Oral  Oral    SpO2: Comment: was not picking up 96% 97% 99%   Weight: 85.3 kg (188 lb)      Height: 6' 3" (1.905 m)        Appearance: No acute distress.  Head: Minimal occipital tenderness.    Neck: No cervical spine tenderness.  Full range of motion.  No soft tissue tenderness.  Back: No thoracic, lumbar or sacral spine tenderness.  No soft tissue tenderness.  Chest: No chest wall tenderness.  Breath sounds are equal bilaterally.  No wheezes.  No rhonchi.  No rales.  Cardiovascular: Regular rate and rhythm.  LVAD sounds.  Abdomen: Soft. Nontender.   No distention.  No guarding. No rebound.  No ecchymoses.  Integument: No ecchymoses or other signs of trauma.  Musculoskeletal: Good range of motion of all joints.  No bony tenderness in the extremities.  No deformities.  No soft tissue tenderness.  Neurologic: Motor intact.  Sensation intact.  Cranial nerves II through XII intact.  Cerebellar exam intact.  Mental " status: Alert and oriented x 3.  GCS 15.      Initial MDM   Syncope versus seizure today while in the parking garage with minimal head trauma.  Patient is anticoagulated with LVAD.  He was here to be admitted to cardiology any way.  Will obtain CT scan and basic labs.  He is at baseline mental status and feels quite comfortable to mom it.  Will contact Cardiology once testing is resulted.    I decided to obtain the patient's medical records.    Medications   albuterol inhaler 2 puff (has no administration in time range)   famotidine tablet 40 mg (has no administration in time range)   ferrous gluconate tablet 324 mg (has no administration in time range)   furosemide tablet 80 mg (has no administration in time range)   gabapentin capsule 100 mg (has no administration in time range)   INV ASPIRIN 100MG/PLACEBO 100 mg (has no administration in time range)   levETIRAcetam tablet 1,000 mg (has no administration in time range)   magnesium oxide tablet 800 mg (has no administration in time range)   warfarin tablet 3 mg (has no administration in time range)   tiZANidine tablet 2 mg (has no administration in time range)   spironolactone tablet 25 mg (has no administration in time range)   senna-docusate 8.6-50 mg per tablet 2 tablet (has no administration in time range)   potassium chloride SA CR tablet 40 mEq (has no administration in time range)   omega-3 acid ethyl esters capsule 2 g (has no administration in time range)   mirtazapine tablet 15 mg (has no administration in time range)   milrinone 20mg in D5W 100 mL infusion (has no administration in time range)   busPIRone split tablet 7.5 mg (has no administration in time range)       Results and Course     Labs Reviewed   CBC W/ AUTO DIFFERENTIAL - Abnormal; Notable for the following components:       Result Value    Hemoglobin 12.4 (*)     Hematocrit 38.6 (*)     MCV 79 (*)     MCH 25.3 (*)     RDW 21.2 (*)     All other components within normal limits   COMPREHENSIVE  METABOLIC PANEL - Abnormal; Notable for the following components:    Sodium 134 (*)     CO2 22 (*)     Glucose 235 (*)     Creatinine 1.6 (*)     Total Protein 8.9 (*)     AST 57 (*)     eGFR 56.6 (*)     All other components within normal limits   PROTIME-INR - Abnormal; Notable for the following components:    Prothrombin Time 23.0 (*)     INR 2.3 (*)     All other components within normal limits       Imaging Results              CT Head Without Contrast (Final result)  Result time 09/01/22 11:24:44      Final result by Michael Johnson MD (09/01/22 11:24:44)                   Impression:      Mild posterior extracranial soft tissue swelling without evidence of underlying fracture or acute intracranial hemorrhage.    Electronically signed by resident: Michael Tinsley  Date:    09/01/2022  Time:    11:13    Electronically signed by: Michael Johnson MD  Date:    09/01/2022  Time:    11:24               Narrative:    EXAMINATION:  CT HEAD WITHOUT CONTRAST    CLINICAL HISTORY:  Head trauma, moderate-severe;    TECHNIQUE:  Low dose axial CT images obtained throughout the head without the use of intravenous contrast.  Axial, sagittal and coronal reconstructions were performed.    COMPARISON:  CT 07/19/2022, 04/12/2022    FINDINGS:  Intracranial compartment:    Ventricles and sulci are normal in size for age without evidence of hydrocephalus.    Brain parenchyma appears unchanged.  Stable small focus of encephalomalacia in the left occipital lobe.  No new major vascular distribution infarct.  No acute parenchymal hemorrhage.  No intracranial mass effect or midline shift.    No new extra-axial blood or fluid collections.    Skull/extracranial contents (limited evaluation):    No acute displaced calvarial fracture.    Small focus soft tissue swelling posteriorly.    Mastoid air cells and paranasal sinuses are essentially clear.                                      ED Course as of 09/01/22 1503   Thu Sep 01, 2022   1145  WBC: 10.19 [DC]   1145 Hemoglobin(!): 12.4 [DC]   1145 Platelets: 249 [DC]   1145 Creatinine(!): 1.6 [DC]   1145 INR(!): 2.3 [DC]   1151 Discussed with cardiology   [DC]      ED Course User Index  [DC] Otoniel Mallory MD           MDM, Impression and Plan   37 y.o. male with head trauma, syncope versus seizure in this patient with LVAD.  Patient feels well now currently.  Cardiology plans admission.         Final diagnoses:  [R55] Syncope  [S09.90XA] Head trauma, initial encounter (Primary)        ED Disposition Condition    Admit                   Otoniel Mallory MD  09/01/22 [id number]

## 2022-09-02 ENCOUNTER — DOCUMENTATION ONLY (OUTPATIENT)
Dept: ELECTROPHYSIOLOGY | Facility: CLINIC | Age: 37
End: 2022-09-02
Payer: MEDICAID

## 2022-09-02 PROBLEM — R55 SYNCOPE: Status: RESOLVED | Noted: 2022-09-01 | Resolved: 2022-09-02

## 2022-09-02 PROBLEM — E11.65 TYPE 2 DIABETES MELLITUS WITH HYPERGLYCEMIA: Status: ACTIVE | Noted: 2022-05-25

## 2022-09-02 LAB
ALBUMIN SERPL BCP-MCNC: 3.6 G/DL (ref 3.5–5.2)
ALLENS TEST: ABNORMAL
ALP SERPL-CCNC: 117 U/L (ref 55–135)
ALT SERPL W/O P-5'-P-CCNC: 32 U/L (ref 10–44)
ANION GAP SERPL CALC-SCNC: 10 MMOL/L (ref 8–16)
APTT BLDCRRT: 33.3 SEC (ref 21–32)
AST SERPL-CCNC: 36 U/L (ref 10–40)
BASOPHILS # BLD AUTO: 0.04 K/UL (ref 0–0.2)
BASOPHILS NFR BLD: 0.3 % (ref 0–1.9)
BILIRUB DIRECT SERPL-MCNC: 0.2 MG/DL (ref 0.1–0.3)
BILIRUB SERPL-MCNC: 0.5 MG/DL (ref 0.1–1)
BNP SERPL-MCNC: 43 PG/ML (ref 0–99)
BUN SERPL-MCNC: 17 MG/DL (ref 6–20)
CALCIUM SERPL-MCNC: 10.1 MG/DL (ref 8.7–10.5)
CHLORIDE SERPL-SCNC: 103 MMOL/L (ref 95–110)
CO2 SERPL-SCNC: 25 MMOL/L (ref 23–29)
CREAT SERPL-MCNC: 1.3 MG/DL (ref 0.5–1.4)
CRP SERPL-MCNC: 2 MG/L (ref 0–8.2)
DELSYS: ABNORMAL
DIFFERENTIAL METHOD: ABNORMAL
EOSINOPHIL # BLD AUTO: 0.1 K/UL (ref 0–0.5)
EOSINOPHIL NFR BLD: 0.9 % (ref 0–8)
ERYTHROCYTE [DISTWIDTH] IN BLOOD BY AUTOMATED COUNT: 21.7 % (ref 11.5–14.5)
EST. GFR  (NO RACE VARIABLE): >60 ML/MIN/1.73 M^2
GLUCOSE SERPL-MCNC: 190 MG/DL (ref 70–110)
HCO3 UR-SCNC: 29.7 MMOL/L (ref 24–28)
HCT VFR BLD AUTO: 36.2 % (ref 40–54)
HGB BLD-MCNC: 12.3 G/DL (ref 14–18)
IMM GRANULOCYTES # BLD AUTO: 0.04 K/UL (ref 0–0.04)
IMM GRANULOCYTES NFR BLD AUTO: 0.3 % (ref 0–0.5)
INR PPP: 1.9 (ref 0.8–1.2)
LDH SERPL L TO P-CCNC: 233 U/L (ref 110–260)
LYMPHOCYTES # BLD AUTO: 2.5 K/UL (ref 1–4.8)
LYMPHOCYTES NFR BLD: 19.7 % (ref 18–48)
MAGNESIUM SERPL-MCNC: 1.9 MG/DL (ref 1.6–2.6)
MCH RBC QN AUTO: 26.7 PG (ref 27–31)
MCHC RBC AUTO-ENTMCNC: 34 G/DL (ref 32–36)
MCV RBC AUTO: 79 FL (ref 82–98)
MODE: ABNORMAL
MONOCYTES # BLD AUTO: 1 K/UL (ref 0.3–1)
MONOCYTES NFR BLD: 7.5 % (ref 4–15)
NEUTROPHILS # BLD AUTO: 9.1 K/UL (ref 1.8–7.7)
NEUTROPHILS NFR BLD: 71.3 % (ref 38–73)
NRBC BLD-RTO: 0 /100 WBC
PCO2 BLDA: 51.3 MMHG (ref 35–45)
PH SMN: 7.37 [PH] (ref 7.35–7.45)
PLATELET # BLD AUTO: 279 K/UL (ref 150–450)
PMV BLD AUTO: 9.3 FL (ref 9.2–12.9)
PO2 BLDA: 31 MMHG (ref 40–60)
POC BE: 4 MMOL/L
POC SATURATED O2: 56 % (ref 95–100)
POC TCO2: 31 MMOL/L (ref 24–29)
POCT GLUCOSE: 129 MG/DL (ref 70–110)
POCT GLUCOSE: 167 MG/DL (ref 70–110)
POCT GLUCOSE: 169 MG/DL (ref 70–110)
POCT GLUCOSE: 198 MG/DL (ref 70–110)
POCT GLUCOSE: 264 MG/DL (ref 70–110)
POTASSIUM SERPL-SCNC: 4.1 MMOL/L (ref 3.5–5.1)
PREALB SERPL-MCNC: 32 MG/DL (ref 20–43)
PROT SERPL-MCNC: 8.2 G/DL (ref 6–8.4)
PROTHROMBIN TIME: 19 SEC (ref 9–12.5)
RBC # BLD AUTO: 4.6 M/UL (ref 4.6–6.2)
SAMPLE: ABNORMAL
SITE: ABNORMAL
SODIUM SERPL-SCNC: 138 MMOL/L (ref 136–145)
WBC # BLD AUTO: 12.72 K/UL (ref 3.9–12.7)

## 2022-09-02 PROCEDURE — 99233 SBSQ HOSP IP/OBS HIGH 50: CPT | Mod: ,,, | Performed by: PHYSICIAN ASSISTANT

## 2022-09-02 PROCEDURE — 95720 PR EEG, W/VIDEO, CONT RECORD, I&R, >12<26 HRS: ICD-10-PCS | Mod: ,,, | Performed by: PSYCHIATRY & NEUROLOGY

## 2022-09-02 PROCEDURE — 27000248 HC VAD-ADDITIONAL DAY

## 2022-09-02 PROCEDURE — 63600175 PHARM REV CODE 636 W HCPCS: Performed by: NURSE PRACTITIONER

## 2022-09-02 PROCEDURE — 99233 PR SUBSEQUENT HOSPITAL CARE,LEVL III: ICD-10-PCS | Mod: ,,, | Performed by: PHYSICIAN ASSISTANT

## 2022-09-02 PROCEDURE — 83880 ASSAY OF NATRIURETIC PEPTIDE: CPT | Performed by: PHYSICIAN ASSISTANT

## 2022-09-02 PROCEDURE — 83615 LACTATE (LD) (LDH) ENZYME: CPT | Performed by: PHYSICIAN ASSISTANT

## 2022-09-02 PROCEDURE — 95714 VEEG EA 12-26 HR UNMNTR: CPT

## 2022-09-02 PROCEDURE — 85730 THROMBOPLASTIN TIME PARTIAL: CPT | Performed by: PHYSICIAN ASSISTANT

## 2022-09-02 PROCEDURE — 63600175 PHARM REV CODE 636 W HCPCS: Performed by: PHYSICIAN ASSISTANT

## 2022-09-02 PROCEDURE — 99233 SBSQ HOSP IP/OBS HIGH 50: CPT | Mod: ,,, | Performed by: INTERNAL MEDICINE

## 2022-09-02 PROCEDURE — 85610 PROTHROMBIN TIME: CPT | Performed by: PHYSICIAN ASSISTANT

## 2022-09-02 PROCEDURE — 85025 COMPLETE CBC W/AUTO DIFF WBC: CPT | Performed by: PHYSICIAN ASSISTANT

## 2022-09-02 PROCEDURE — 99233 PR SUBSEQUENT HOSPITAL CARE,LEVL III: ICD-10-PCS | Mod: ,,, | Performed by: PSYCHIATRY & NEUROLOGY

## 2022-09-02 PROCEDURE — 80076 HEPATIC FUNCTION PANEL: CPT | Performed by: PHYSICIAN ASSISTANT

## 2022-09-02 PROCEDURE — 99233 SBSQ HOSP IP/OBS HIGH 50: CPT | Mod: ,,, | Performed by: PSYCHIATRY & NEUROLOGY

## 2022-09-02 PROCEDURE — 94761 N-INVAS EAR/PLS OXIMETRY MLT: CPT

## 2022-09-02 PROCEDURE — 80048 BASIC METABOLIC PNL TOTAL CA: CPT | Performed by: PHYSICIAN ASSISTANT

## 2022-09-02 PROCEDURE — 84134 ASSAY OF PREALBUMIN: CPT | Performed by: PHYSICIAN ASSISTANT

## 2022-09-02 PROCEDURE — 83735 ASSAY OF MAGNESIUM: CPT | Performed by: PHYSICIAN ASSISTANT

## 2022-09-02 PROCEDURE — 85347 COAGULATION TIME ACTIVATED: CPT

## 2022-09-02 PROCEDURE — 95700 EEG CONT REC W/VID EEG TECH: CPT

## 2022-09-02 PROCEDURE — 99222 PR INITIAL HOSPITAL CARE,LEVL II: ICD-10-PCS | Mod: ,,, | Performed by: NURSE PRACTITIONER

## 2022-09-02 PROCEDURE — 99233 PR SUBSEQUENT HOSPITAL CARE,LEVL III: ICD-10-PCS | Mod: ,,, | Performed by: INTERNAL MEDICINE

## 2022-09-02 PROCEDURE — 86140 C-REACTIVE PROTEIN: CPT | Performed by: PHYSICIAN ASSISTANT

## 2022-09-02 PROCEDURE — 95720 EEG PHY/QHP EA INCR W/VEEG: CPT | Mod: ,,, | Performed by: PSYCHIATRY & NEUROLOGY

## 2022-09-02 PROCEDURE — 93750 INTERROGATION VAD IN PERSON: CPT | Mod: ,,, | Performed by: INTERNAL MEDICINE

## 2022-09-02 PROCEDURE — 20600001 HC STEP DOWN PRIVATE ROOM

## 2022-09-02 PROCEDURE — 99222 1ST HOSP IP/OBS MODERATE 55: CPT | Mod: ,,, | Performed by: NURSE PRACTITIONER

## 2022-09-02 PROCEDURE — 93750 PR INTERROGATE VENT ASSIST DEV, IN PERSON, W PHYSICIAN ANALYSIS: ICD-10-PCS | Mod: ,,, | Performed by: INTERNAL MEDICINE

## 2022-09-02 PROCEDURE — 25000003 PHARM REV CODE 250: Performed by: PHYSICIAN ASSISTANT

## 2022-09-02 PROCEDURE — 99900035 HC TECH TIME PER 15 MIN (STAT)

## 2022-09-02 RX ORDER — ACETAMINOPHEN 325 MG/1
650 TABLET ORAL EVERY 6 HOURS PRN
Status: DISCONTINUED | OUTPATIENT
Start: 2022-09-02 | End: 2022-09-06 | Stop reason: HOSPADM

## 2022-09-02 RX ORDER — INSULIN ASPART 100 [IU]/ML
12 INJECTION, SOLUTION INTRAVENOUS; SUBCUTANEOUS
Status: DISCONTINUED | OUTPATIENT
Start: 2022-09-02 | End: 2022-09-03

## 2022-09-02 RX ORDER — LORAZEPAM 2 MG/ML
2 INJECTION INTRAMUSCULAR EVERY 5 MIN PRN
Status: DISCONTINUED | OUTPATIENT
Start: 2022-09-02 | End: 2022-09-06 | Stop reason: HOSPADM

## 2022-09-02 RX ORDER — TRAMADOL HYDROCHLORIDE 50 MG/1
50 TABLET ORAL ONCE
Status: COMPLETED | OUTPATIENT
Start: 2022-09-03 | End: 2022-09-03

## 2022-09-02 RX ORDER — DIPHENHYDRAMINE HCL 50 MG
50 CAPSULE ORAL ONCE
Status: COMPLETED | OUTPATIENT
Start: 2022-09-03 | End: 2022-09-03

## 2022-09-02 RX ORDER — LIDOCAINE 50 MG/G
2 PATCH TOPICAL
Status: DISCONTINUED | OUTPATIENT
Start: 2022-09-02 | End: 2022-09-06 | Stop reason: HOSPADM

## 2022-09-02 RX ADMIN — LEVETIRACETAM 1000 MG: 500 TABLET, FILM COATED ORAL at 09:09

## 2022-09-02 RX ADMIN — MILRINONE LACTATE IN DEXTROSE 0.25 MCG/KG/MIN: 200 INJECTION, SOLUTION INTRAVENOUS at 07:09

## 2022-09-02 RX ADMIN — INSULIN ASPART 12 UNITS: 100 INJECTION, SOLUTION INTRAVENOUS; SUBCUTANEOUS at 12:09

## 2022-09-02 RX ADMIN — GABAPENTIN 100 MG: 100 CAPSULE ORAL at 09:09

## 2022-09-02 RX ADMIN — Medication 800 MG: at 08:09

## 2022-09-02 RX ADMIN — GABAPENTIN 100 MG: 100 CAPSULE ORAL at 08:09

## 2022-09-02 RX ADMIN — GABAPENTIN 100 MG: 100 CAPSULE ORAL at 03:09

## 2022-09-02 RX ADMIN — FAMOTIDINE 40 MG: 20 TABLET ORAL at 08:09

## 2022-09-02 RX ADMIN — POTASSIUM CHLORIDE 40 MEQ: 1500 TABLET, EXTENDED RELEASE ORAL at 09:09

## 2022-09-02 RX ADMIN — TIZANIDINE 2 MG: 2 TABLET ORAL at 12:09

## 2022-09-02 RX ADMIN — FUROSEMIDE 80 MG: 80 TABLET ORAL at 08:09

## 2022-09-02 RX ADMIN — INSULIN ASPART 1 UNITS: 100 INJECTION, SOLUTION INTRAVENOUS; SUBCUTANEOUS at 09:09

## 2022-09-02 RX ADMIN — BUSPIRONE HYDROCHLORIDE 7.5 MG: 5 TABLET ORAL at 06:09

## 2022-09-02 RX ADMIN — LEVETIRACETAM 1000 MG: 500 TABLET, FILM COATED ORAL at 08:09

## 2022-09-02 RX ADMIN — SPIRONOLACTONE 25 MG: 25 TABLET, FILM COATED ORAL at 08:09

## 2022-09-02 RX ADMIN — FERROUS GLUCONATE 324 MG: 324 TABLET ORAL at 08:09

## 2022-09-02 RX ADMIN — INSULIN ASPART 12 UNITS: 100 INJECTION, SOLUTION INTRAVENOUS; SUBCUTANEOUS at 05:09

## 2022-09-02 RX ADMIN — MIRTAZAPINE 15 MG: 15 TABLET, FILM COATED ORAL at 09:09

## 2022-09-02 RX ADMIN — INSULIN ASPART 2 UNITS: 100 INJECTION, SOLUTION INTRAVENOUS; SUBCUTANEOUS at 05:09

## 2022-09-02 RX ADMIN — MILRINONE LACTATE IN DEXTROSE 0.25 MCG/KG/MIN: 200 INJECTION, SOLUTION INTRAVENOUS at 10:09

## 2022-09-02 RX ADMIN — OMEGA-3-ACID ETHYL ESTERS 2 G: 1 CAPSULE, LIQUID FILLED ORAL at 08:09

## 2022-09-02 RX ADMIN — OMEGA-3-ACID ETHYL ESTERS 2 G: 1 CAPSULE, LIQUID FILLED ORAL at 09:09

## 2022-09-02 RX ADMIN — WARFARIN SODIUM 3 MG: 3 TABLET ORAL at 04:09

## 2022-09-02 RX ADMIN — POTASSIUM CHLORIDE 40 MEQ: 1500 TABLET, EXTENDED RELEASE ORAL at 08:09

## 2022-09-02 RX ADMIN — POTASSIUM CHLORIDE 40 MEQ: 1500 TABLET, EXTENDED RELEASE ORAL at 03:09

## 2022-09-02 RX ADMIN — INSULIN ASPART 6 UNITS: 100 INJECTION, SOLUTION INTRAVENOUS; SUBCUTANEOUS at 08:09

## 2022-09-02 RX ADMIN — Medication 100 MG: at 09:09

## 2022-09-02 RX ADMIN — Medication 800 MG: at 09:09

## 2022-09-02 RX ADMIN — INSULIN ASPART 1 UNITS: 100 INJECTION, SOLUTION INTRAVENOUS; SUBCUTANEOUS at 12:09

## 2022-09-02 RX ADMIN — INSULIN ASPART 8 UNITS: 100 INJECTION, SOLUTION INTRAVENOUS; SUBCUTANEOUS at 08:09

## 2022-09-02 NOTE — PROCEDURES
EEG REPORT      Kevan Queen  [id number]  1985    DATE OF SERVICE: 9/2/2022     -1    METHODOLOGY      Extended electroencephalographic recording is made while the patient is ambulatory and continuing normal daily activities.  Electrodes are placed according to the International 10-20 placement system and included T1 and T2 electrode placement.  Twenty four (24) channels of digital signal (sampling rate of 512/sec) was simultaneously recorded from the scalp including EKG and eye monitors.  Recording band pass was 0.1 to 100 hz and all data was stored digitally on the recorder.  The patient is instructed to press an event button when clinical symptoms occur and write the symptoms into a diary. Activation procedures which include photic stimulation, hyperventilation and instructing patients to perform simple task are done in selected patients.        The EEG is displayed on a monitor screen and can be reformatted into different montages for evaluation.  The entire recoding is submitted for computer assisted analysis to detect spike and electrographic seizure activity.  The entire recording is visually reviewed and the times identified by computer analysis as being spikes or seizures are reviewed again.  Compresses spectral analysis (CSA) is also performed on the activity recorded from each individual channel.  This is displayed as a power display of frequencies from 0 to 30 Hz over time.   The CSA analysis is done and displayed continuously.  This is reviewed for asymmetries in power between homologous areas of the scalp and for presence of changes in power which canbe seen when seizures occur.  Sections of suspected abnormalities on the CSA is then compared with the original EEG recording.  .     Resolver software was also utilized in the review of this study.  This software suite analyzes the EEG recording in multiple domains.  Coherence and rhythmicity is computed to identify EEG sections which may  contain organized seizures.  Each channel undergoes analysis to detect presence of spike and sharp waves which have special and morphological characteristic of epileptic activity.  The routine EEG recording is converted from spacial into frequency domain.  This is then displayed comparing homologous areas to identify areas of significant asymmetry.  Algorithm to identify non-cortically generated artifact is used to separate eye movement, EMG and other artifact from the EEG     Recording Times    A total of 2:54:06 hours of EEG was recorded.      EEG FINDINGS:    IMPRESSION:   This is a cap study of extremely poor quality and is uninterpretable due to poor electrode contact.  Recommend conventional hook up.      Haider Devi MD  Neurology-Epilepsy.  [facility].

## 2022-09-02 NOTE — PLAN OF CARE
AAOX4,VSS,O2 sats>97% on RA. Patient ambulating independently, fall precautions in place. LVAD DP and numbers WNL, smooth LVAD hum. Patient has no complaints of pain/SOB. Pt on milrinone 0.25mcg/kg/min. Pt on continuous EEG monitoring. Discussed medications and care. Patient has no questions at this time.Pt visualised and stable.Pt resting well,call light within reach, bed at the lowest position.     Problem: Adult Inpatient Plan of Care  Goal: Plan of Care Review  9/2/2022 1715 by Karyn Forbes RN  Outcome: Ongoing, Progressing  9/2/2022 1632 by Karyn Forbes RN  Outcome: Ongoing, Progressing  Goal: Absence of Hospital-Acquired Illness or Injury  9/2/2022 1715 by Karyn Forbes RN  Outcome: Ongoing, Progressing  9/2/2022 1632 by Karyn Forbes RN  Outcome: Ongoing, Progressing  Intervention: Identify and Manage Fall Risk  9/2/2022 1715 by Karyn Forbes RN  Flowsheets (Taken 9/2/2022 1715)  Safety Promotion/Fall Prevention:   assistive device/personal item within reach   Fall Risk signage in place   Fall Risk reviewed with patient/family   family to remain at bedside   nonskid shoes/socks when out of bed   side rails raised x 2  9/2/2022 1632 by Karyn Forbes RN  Flowsheets (Taken 9/2/2022 1631)  Safety Promotion/Fall Prevention: assistive device/personal item within reach  Intervention: Prevent Skin Injury  Flowsheets (Taken 9/2/2022 1715)  Body Position: position changed independently  Skin Protection:   adhesive use limited   skin-to-device areas padded   skin-to-skin areas padded   tubing/devices free from skin contact  Intervention: Prevent and Manage VTE (Venous Thromboembolism) Risk  Flowsheets (Taken 9/2/2022 1715)  Activity Management: Ambulated -L4  VTE Prevention/Management: ambulation promoted  Intervention: Prevent Infection  Flowsheets (Taken 9/2/2022 1715)  Infection Prevention:   environmental surveillance performed   personal protective equipment utilized   rest/sleep promoted   single  patient room provided  Goal: Optimal Comfort and Wellbeing  Outcome: Ongoing, Progressing  Intervention: Monitor Pain and Promote Comfort  Flowsheets (Taken 9/2/2022 1715)  Pain Management Interventions: care clustered  Intervention: Provide Person-Centered Care  Flowsheets (Taken 9/2/2022 1715)  Trust Relationship/Rapport:   care explained   choices provided   emotional support provided   empathic listening provided   questions answered   questions encouraged   reassurance provided     Problem: Diabetes Comorbidity  Goal: Blood Glucose Level Within Targeted Range  Outcome: Ongoing, Progressing  Intervention: Monitor and Manage Glycemia  Flowsheets (Taken 9/2/2022 1715)  Glycemic Management:   blood glucose monitored   supplemental insulin given     Problem: Infection  Goal: Absence of Infection Signs and Symptoms  Outcome: Ongoing, Progressing  Intervention: Prevent or Manage Infection  Flowsheets (Taken 9/2/2022 1715)  Fever Reduction/Comfort Measures:   lightweight bedding   lightweight clothing  Infection Management: aseptic technique maintained     Problem: Fall Injury Risk  Goal: Absence of Fall and Fall-Related Injury  Outcome: Ongoing, Progressing  Intervention: Identify and Manage Contributors  Flowsheets (Taken 9/2/2022 1715)  Self-Care Promotion: independence encouraged  Intervention: Promote Injury-Free Environment  Flowsheets (Taken 9/2/2022 1715)  Safety Promotion/Fall Prevention:   assistive device/personal item within reach   Fall Risk signage in place   Fall Risk reviewed with patient/family   family to remain at bedside   nonskid shoes/socks when out of bed   side rails raised x 2

## 2022-09-02 NOTE — ASSESSMENT & PLAN NOTE
"37 y.o. male with [facility] s/p LVAD (6/2022) presents after syncope vs. seizure event in parking garage this morning. Pt reports he felt a warning funny sensation in head then lost consciousness, fell backwards hitting his head on the concrete. He proceeded to his scheduled Cards appt and was back at cognitive baseline. LVAD interrogated without associated alarms, settings not adjusted. He was advised to present to ED where CTH without contrast was unremarkable for acute intracranial pathology. He is currently taking Keppra 1 g BID, which was started during LVAD admission for recurrent episodes of loss of awareness with L TIRDA on EEG and encephalomalacia on imaging. Recently seen by Dr. Devi who considered EMU admission in October for event characterization; however, patient unable to be admitted to the EMU service given continuous milrinone infusion.     9/2 further history of episodes obtained. He reports feeling an "electrical sensation" in bifrontal area, which sometimes precedes an event but not always associated with an event. This sensation is worse when trying to concentrate and with eye movement. Prior to an event, he will have weakness and his surroundings appear in slow motion. His significant other reports increase in events with lack of sleep and increased stress. He has been having these events every other day. He has gone a maximum of 4 days without an event and noted he was not taking his tramadol at that time. No associated postictal state with every event, reports postictal symptoms after 2 events.    Recommendations:  --admitted to Cards service, LTM EEG ordered, will start seizure provoking methods once EEG is connected  --continue Keppra 1 g BID  --continue current dose of gabapentin, once EEG is completed recommend increasing dose to 300 mg TID for neuropathic pain in L hand and foot  --check levetiracetam level  --please discontinue tramadol and other medications that lower seizure " threshold, unless otherwise ordered for seizure provoking   --continue correction of electrolyte derangements   --seizure precautions

## 2022-09-02 NOTE — NURSING
Pt's wife to do dressing change this evening. This RN put supplies for his daily dressing change with kit in the room. Pt to call if assistance is needed.

## 2022-09-02 NOTE — ASSESSMENT & PLAN NOTE
BG goal 140 -180     - Increase Levemir to 24 units HS. FBG above goal this AM.   - Increase Novolog to 12 units TIDWM. Prandial BG excursions noted.   - Moderate Dose SQ Insulin Correction Scale.  - BG Monitoring AC/HS     ** Please call Endocrine for any BG related issues **  ** Please notify Endocrine for any change and/or advance in diet**  Lab Results   Component Value Date    HGBA1C 7.4 (H) 09/01/2022       Discharge Planning:   TBD. Please notify endocrinology prior to discharge.

## 2022-09-02 NOTE — ASSESSMENT & PLAN NOTE
-Endocrine consulted and modifying scheduled insulin as needed   - Moderate Dose SQ Insulin Correction Scale.  - BG Monitoring AC/HS

## 2022-09-02 NOTE — ASSESSMENT & PLAN NOTE
-S/P DT HM3 with MVR plus temporary support with Protek Duo for RV support 6/29 (Grecia)  -On Milrinone at home 0.25  - GASTON trial   - Continue coumadin.  INR subtherapeutic today at 1.9 (goal 2.0-3.0). Previous home dose was 4.5 on Mon, Thurs, and 3mg Qdaily.  - LDH stable  -Continue po Lasix.  (does not tolerate po Bumex or Torsemide due to hives)   -RHC 7/21/22  with speed 5100 RA: 14, RV: 41/8 (14), PA: 42/17 (25), PWP: 16/17 (15), CO: 5.32, CI: 2.51.  Was scheduled to have repeat RHC as an outpatient today but was postponed as patient with EEG attached.  Will plan to get repeat RHC next week   - ECHO 9/1 with speed at 5100- EF: 10%, LVEDD: 5.97mc       Procedure: Device Interrogation Including analysis of device parameters  Current Settings: Ventricular Assist Device  Review of device function is stable    TXP LVAD INTERROGATIONS 9/2/2022 9/2/2022 9/2/2022 9/1/2022 9/1/2022 8/22/2022 8/18/2022   Type HeartMate3 HeartMate3 HeartMate3 HeartMate3 - - -   Flow 5100 3.8 3.6 - - - -   Speed 3.8 5100 5100 - - - -   PI 5.5 6.5 6.7 - - - -   Power (Serna) 3.7 3.8 3.8 - - - -   LSL - 4700 4700 - - - -   Pulsatility Intermittent pulse Pulse Pulse - No Pulse No Pulse Intermittent pulse

## 2022-09-02 NOTE — ED NOTES
EEG cap placed. Screen shows artifact. EEG tech paged to troubleshoot with no luck. Primary RN will notify team to see if they can see the EEG.

## 2022-09-02 NOTE — PROGRESS NOTES
09/02/2022  Ruma Li    Current provider:  Dalia Crum MD    Device interrogation:  TXP LVAD INTERROGATIONS 9/2/2022 9/2/2022 9/2/2022 9/1/2022 9/1/2022 8/22/2022 8/18/2022   Type HeartMate3 HeartMate3 HeartMate3 HeartMate3 - - -   Flow 5100 3.8 3.6 - - - -   Speed 3.8 5100 5100 - - - -   PI 5.5 6.5 6.7 - - - -   Power (Serna) 3.7 3.8 3.8 - - - -   LSL - 4700 4700 - - - -   Pulsatility Intermittent pulse Pulse Pulse - No Pulse No Pulse Intermittent pulse          Rounded on Kevan Queen to ensure all mechanical assist device settings (IABP or VAD) were appropriate and all parameters were within limits.  I was able to ensure all back up equipment was present, the staff had no issues, and the Perfusion Department daily rounding was complete.      For implantable VADs: Interrogation of Ventricular assist device was performed with analysis of device parameters and review of device function. I have personally reviewed the interrogation findings and agree with findings as stated.     In emergency, the nursing units have been notified to contact the perfusion department either by:  Calling [phone] from 630am to 4pm Mon thru Fri, utilizing the On-Call Finder functionality of Epic and searching for Perfusion, or by contacting the hospital  from 4pm to 630am and on weekends and asking to speak with the perfusionist on call.    10:00 AM

## 2022-09-02 NOTE — PROGRESS NOTES
"[facility] - Cardiology Stepdown  Heart Transplant  Progress Note    Patient Name: Kevan Queen  MRN: [id number]  Admission Date: 9/1/2022  Hospital Length of Stay: 1 days  Attending Physician: Dalia Crum MD  Primary Care Provider: ORALIA Cline  Principal Problem:Awareness alteration, transient    Subjective:     Interval History: Patient reports no new complaints this morning.  He reports he has not had an "episode" since admission.  CT of head negative.  Neurology recommended long term EEG which was in place at time of my exam.  Per Neurology report; they would like to place orders to do things to provoke seizure.  RHC was scheduled as outpatient for today.  Will hold on procedure due to EEG cap.  Can consider doing early next week if he is still inpatient vs rescheduling as outpatient.  He appears euvolemic on exam.  CVP: 7, SVO2: 56, CO: 4.28, CI: 2.12, SVR: 1476.    Continuous Infusions:   milrinone 20mg/100ml D5W (200mcg/ml) 0.25 mcg/kg/min (09/02/22 0739)     Scheduled Meds:   busPIRone  7.5 mg Oral Daily    famotidine  40 mg Oral Daily    ferrous gluconate  324 mg Oral Daily with breakfast    furosemide  80 mg Oral Daily    gabapentin  100 mg Oral TID    insulin aspart U-100  12 Units Subcutaneous TIDWM    insulin detemir U-100  24 Units Subcutaneous QHS    INV ASPIRIN 100MG/PLACEBO  100 mg Oral Daily    levETIRAcetam  1,000 mg Oral BID    LIDOcaine  2 patch Transdermal Q24H    magnesium oxide  800 mg Oral BID    mirtazapine  15 mg Oral Nightly    omega-3 acid ethyl esters  2 g Oral BID    potassium chloride SA  40 mEq Oral TID    senna-docusate 8.6-50 mg  2 tablet Oral Daily    spironolactone  25 mg Oral Daily    warfarin  3 mg Oral Daily     PRN Meds:albuterol, dextrose 10%, dextrose 10%, glucagon (human recombinant), glucose, glucose, insulin aspart U-100, tiZANidine    Review of patient's allergies indicates:   Allergen Reactions    Aspirin Other (See Comments)     Mr. Thacker " is enrolled in Dr. Paige's Alon Trial and cannot have any aspirin and/or aspirin-containing products. DO NOT cancel any orders for INV Aspirin 100 mg/Placebo. If you have questions, please contact Isabel @ [phone], [email or web address], secure chat or MS Teams message.    Bumex [bumetanide] Hives    Lactose Diarrhea     Other reaction(s): Abdominal distension, gaseous    Torsemide Hives     Objective:     Vital Signs (Most Recent):  Temp: 97.7 °F (36.5 °C) (09/02/22 1119)  Pulse: 106 (09/02/22 1119)  Resp: 17 (09/02/22 1119)  BP: 103/78 (09/02/22 1119)  SpO2: 98 % (09/02/22 1119) Vital Signs (24h Range):  Temp:  [97.6 °F (36.4 °C)-98.3 °F (36.8 °C)] 97.7 °F (36.5 °C)  Pulse:  [103-129] 106  Resp:  [16-24] 17  SpO2:  [98 %-100 %] 98 %  BP: ()/(0-80) 103/78     Patient Vitals for the past 72 hrs (Last 3 readings):   Weight   09/02/22 0900 85.3 kg (188 lb 0.8 oz)   09/01/22 2036 86 kg (189 lb 9.5 oz)   09/01/22 1015 85.3 kg (188 lb)     Body mass index is 23.5 kg/m².      Intake/Output Summary (Last 24 hours) at 9/2/2022 1228  Last data filed at 9/2/2022 0900  Gross per 24 hour   Intake 480 ml   Output 300 ml   Net 180 ml       Hemodynamic Parameters:  CVP:  [7 mmHg] 7 mmHg    Telemetry: reviewed  Physical Exam  Constitutional:       Appearance: Normal appearance.   HENT:      Head: Normocephalic and atraumatic.   Eyes:      Extraocular Movements: Extraocular movements intact.      Pupils: Pupils are equal, round, and reactive to light.   Neck:      Comments: No JVD elevation  Cardiovascular:      Rate and Rhythm: Normal rate and regular rhythm.      Pulses: Normal pulses.      Heart sounds: Normal heart sounds. No murmur heard.     Comments: Smooth VAD hum    Pulmonary:      Effort: Pulmonary effort is normal.      Breath sounds: Normal breath sounds.   Abdominal:      General: Abdomen is flat. There is no distension.      Comments: DLES intact    Musculoskeletal:         General: Normal  range of motion.      Cervical back: Neck supple.   Skin:     General: Skin is warm.      Capillary Refill: Capillary refill takes 2 to 3 seconds.   Neurological:      General: No focal deficit present.      Mental Status: He is alert and oriented to person, place, and time.           Significant Labs:  CBC:  Recent Labs   Lab 09/01/22 0930 09/01/22 1039 09/02/22  0416   WBC 11.14 10.19 12.72*   RBC 4.94 4.91 4.60   HGB 12.6* 12.4* 12.3*   HCT 39.7* 38.6* 36.2*    249 279   MCV 80* 79* 79*   MCH 25.5* 25.3* 26.7*   MCHC 31.7* 32.1 34.0     BNP:  Recent Labs   Lab 09/01/22 0930 09/02/22  0416   BNP 33 43     CMP:  Recent Labs   Lab 09/01/22 0930 09/01/22 1039 09/02/22  0416   * 235* 190*   CALCIUM 9.9 9.7 10.1   ALBUMIN 3.8 3.8 3.6   PROT 8.7* 8.9* 8.2   * 134* 138   K 3.7 4.5 4.1   CO2 25 22* 25   CL 99 100 103   BUN 17 17 17   CREATININE 1.7* 1.6* 1.3   ALKPHOS 126 131 117   ALT 35 40 32   AST 38 57* 36   BILITOT 0.4 0.4 0.5      Coagulation:   Recent Labs   Lab 09/01/22 0930 09/01/22 1039 09/02/22  0416   INR 2.0* 2.3* 1.9*   APTT  --   --  33.3*     LDH:  Recent Labs   Lab 09/01/22 0930 09/02/22  0416    233     Microbiology:  Microbiology Results (last 7 days)       ** No results found for the last 168 hours. **            I have reviewed all pertinent labs within the past 24 hours.    Estimated Creatinine Clearance: 93 mL/min (based on SCr of 1.3 mg/dL).    Diagnostic Results:  I have reviewed all pertinent imaging results/findings within the past 24 hours.    Assessment and Plan:     Mr. 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 Post op course complicated by RV failure temporarily  "requiring mechanical RV support. He also completed a course of IV Abx for staph epi. He was weaned off  but he had to restarted due to RVF. He was eventually transitioned to milrinone (secondary to  shortage) now on 0.25 mcg/kg/min. He underwent echo this AM and was seen in [facility] clinic today. He did undergo his echo this am however he just informed us that while on his way tot  clinic he had a seizure episode and hit his head. CT scan in the ED negative for intracranial hemorrhage. He reports being lightheaded before losing consciousness and being confused upon waking. He also reports a similar episode earlier this week when he was eating out at restaurant and he was reported by his wfe to "doze off" and not remember and he also was confused upon waking from that episode as well. Denies biting his tongue. VAD speed is at 5100 rpm. No VAD alarms noted on interrogation occasional PI events . BP is 72 mm of Hg. DLES is a "1". INR is therapeutic at 2.05. LDh is at baseline.       * Awareness alteration, transient  - CTH negative  -Neurology consulted and 24 hour EEG done and recommendations made to discontinue tramadol   -No seizures while EEG hooked up   -Plan to try to provoke seizure per Neurology recommendations   - Device interrogation with Tailster done and per verbal report: only one episode of NSVT on 8/10  - Interrogation of VAD function within normal limits without alarms or low flows.     LVAD (left ventricular assist device) present  -S/P DT HM3 with MVR plus temporary support with Protek Duo for RV support 6/29 (Grecia)  -On Milrinone at home 0.25  - GASTON trial   - Continue coumadin.  INR subtherapeutic today at 1.9 (goal 2.0-3.0). Previous home dose was 4.5 on Mon, Thurs, and 3mg Qdaily.  - LDH stable  -Continue po Lasix.  (does not tolerate po Bumex or Torsemide due to hives)   -[facility] 7/21/22  with speed 5100 RA: 14, RV: 41/8 (14), PA: 42/17 (25), PWP: 16/17 (15), CO: 5.32, CI: 2.51.  Was " scheduled to have repeat RHC as an outpatient today but was postponed as patient with EEG attached.  Will plan to get repeat RHC next week   - ECHO 9/1 with speed at 5100- EF: 10%, LVEDD: 5.97mc       Procedure: Device Interrogation Including analysis of device parameters  Current Settings: Ventricular Assist Device  Review of device function is stable    TXP LVAD INTERROGATIONS 9/2/2022 9/2/2022 9/2/2022 9/1/2022 9/1/2022 8/22/2022 8/18/2022   Type HeartMate3 HeartMate3 HeartMate3 HeartMate3 - - -   Flow 5100 3.8 3.6 - - - -   Speed 3.8 5100 5100 - - - -   PI 5.5 6.5 6.7 - - - -   Power (Serna) 3.7 3.8 3.8 - - - -   LSL - 4700 4700 - - - -   Pulsatility Intermittent pulse Pulse Pulse - No Pulse No Pulse Intermittent pulse       Chronic combined systolic and diastolic congestive heart failure  -NICM  -Last 2D Echo 9/1/22: LVEF 10%, LVEDD 5.97 cm  -Euvolemic on examination today  -CVP: 7, SVO2: 56, CO: 4.28, CI: 2.12, SVR: 1476  -Current diuretic regimen: home dose of Lasix.  Can't take Bumex or Torsemide due to hives  -GDMT with home dose of Aldactone   -Patient was not evaluated for OHTx due to recently quit smoking in April 2022 at the time of workup  -Blood type A +  -2g Na dietary restriction, 1500 mL fluid restriction, strict I/Os      Temporal lobe seizure  -On Keppra 1g BID  -Neurology consulted.  See above     Uncontrolled diabetes mellitus  -Endocrine consulted and modifying scheduled insulin as needed   - Moderate Dose SQ Insulin Correction Scale.  - BG Monitoring AC/HS     Anemia of chronic disease  -H/H stable         DEYA Martinez  Heart Transplant  [facility] - Cardiology Stepdown

## 2022-09-02 NOTE — CONSULTS
"[facility] - Cardiology Stepdown  Adult Nutrition  Consult Note    SUMMARY     Recommendations    1. Continue current cardiac diet with fluid per MD- add diabetic restrictions.   2. RD following.    Goals: Continue meeting % EEN/EPN by next RD visit.  Nutrition Goal Status: new  Communication of RD Recs:  (POC)    Assessment and Plan    Nutrition Problem  Increased protein/energy needs     Related to (etiology):   Physiological needs      Signs and Symptoms (as evidenced by):   LVAD present      Interventions (treatment strategy):  Collaboration of nutrition care with other providers   Adequate meal intake      Nutrition Diagnosis Status:   New     Malnutrition Assessment      Orbital Region (Subcutaneous Fat Loss): mild depletion  Upper Arm Region (Subcutaneous Fat Loss): mild depletion   [place] Region (Muscle Loss): mild depletion  Clavicle and Acromion Bone Region (Muscle Loss): mild depletion     Reason for Assessment    Reason For Assessment: consult  Diagnosis:  (Awareness alteration, transient)  Relevant Medical History: LVAD, HFrEF s/p LVAD (6/29/22), stage D CHD, NIDCM  Interdisciplinary Rounds: did not attend  General Information Comments: RD consulted for nutrition assessment. Spoke with pt at bedside. States intake PTA good with 3 meals/day on general diet. States intake now 100% with good appetite. No issues with n/v/d/c/chewing/swallowing. # and endorses no wt loss. UBW per chart review fluctuates between 189-210# d/t fluid. NFPE completed 9/2 and found mild fat and muscle wasting. Pt does not meet any other critiera for malnutrition dxn at this time. LBM 9/1.  Nutrition Discharge Planning: Adequate PO intake    Nutrition Risk Screen    Nutrition Risk Screen: no indicators present    Nutrition/Diet History    Food Allergies: NKFA  Factors Affecting Nutritional Intake: None identified at this time    Anthropometrics    Temp: 97.7 °F (36.5 °C)  Height: 6' 3" (190.5 cm)  Height (inches): 75 " in  Weight Method: Standard Scale  Weight: 85.3 kg (188 lb 0.8 oz)  Weight (lb): 188.05 lb  Ideal Body Weight (IBW), Male: 196 lb  % Ideal Body Weight, Male (lb): 95.94 %  BMI (Calculated): 23.5  BMI Grade: 18.5-24.9 - normal     Lab/Procedures/Meds    Pertinent Labs Reviewed: reviewed  Pertinent Labs Comments: Glucose 190, A1C 7.4  Pertinent Medications Reviewed: reviewed  Pertinent Medications Comments: famotidine, furosemide, gabapentin, insulin, senna-docusate, omega-3 acid, spironolactone    Estimated/Assessed Needs    Weight Used For Calorie Calculations: 85.3 kg (188 lb 0.8 oz)  Energy Calorie Requirements (kcal): 2236 kcal  Energy Need Method: [place] (MSJ x 1.2 PAL)  Protein Requirements: 103 g (1.2 g/kg)  Weight Used For Protein Calculations: 85.3 kg (188 lb 0.8 oz)  Fluid Requirements (mL): 1 ml or fluid per MD  Estimated Fluid Requirement Method: RDA Method  RDA Method (mL): 2236  CHO Requirement: 280 g    Nutrition Prescription Ordered    Current Diet Order: Cardiac with 1500 mL fluid    Evaluation of Received Nutrient/Fluid Intake    I/O: -60 ml since admit  Energy Calories Required: meeting needs  Protein Required: meeting needs  Fluid Required: meeting needs  Tolerance: tolerating  % Intake of Estimated Energy Needs: 75 - 100 %  % Meal Intake: 75 - 100 %    Nutrition Risk    Level of Risk/Frequency of Follow-up:  (1 time/week)     Monitor and Evaluation    Food and Nutrient Intake: energy intake, food and beverage intake  Food and Nutrient Adminstration: diet order  Knowledge/Beliefs/Attitudes: food and nutrition knowledge/skill, beliefs and attitudes  Physical Activity and Function: nutrition-related ADLs and IADLs  Anthropometric Measurements: weight, height/length, weight change, body mass index  Biochemical Data, Medical Tests and Procedures: electrolyte and renal panel, gastrointestinal profile, glucose/endocrine profile, inflammatory profile, lipid profile  Nutrition-Focused Physical  Findings: overall appearance     Nutrition Follow-Up    RD Follow-up?: Yes    Robyn Colon PL-LDN

## 2022-09-02 NOTE — SUBJECTIVE & OBJECTIVE
Interval HPI:   Overnight events:   BG trending downwards to goal since admission. Patient endorses compliance with home DM medications. Patient endorses non-compliance with ADA diet at home. Endocrinology will continue to follow, and manage glycemic control while inpatient.   Diet [facility]; Fluid - 1500mL       Eatin%  Nausea: No  Hypoglycemia and intervention: No  Fever: No  TPN and/or TF: No  If yes, type of TF/TPN and rate: None    PMH, PSH, FH, SH updated and reviewed     Review of Systems  Constitutional: Negative for weight changes.  Eyes: Negative for visual disturbance.  Respiratory: Negative for cough.   Cardiovascular: Negative for chest pain.  Gastrointestinal: Negative for nausea.  Endocrine: Positive for polyuria, polydipsia.  Musculoskeletal: Ngative for back pain.  Skin: Negative for rash.  Neurological: Negative for syncope.  Psychiatric/Behavioral: Negative for depression.    Current Medications and/or Treatments Impacting Glycemic Control  Immunotherapy:    Immunosuppressants       None          Steroids:   Hormones (From admission, onward)      None          Pressors:    Autonomic Drugs (From admission, onward)      None          Hyperglycemia/Diabetes Medications:   Antihyperglycemics (From admission, onward)      Start     Stop Route Frequency Ordered    22 2100  insulin detemir U-100 pen 24 Units         -- SubQ Nightly 22 1100    22 1130  insulin aspart U-100 pen 12 Units         -- SubQ 3 times daily with meals 22 1100    22 1844  insulin aspart U-100 pen 1-10 Units         -- SubQ Before meals & nightly PRN 22 1745             PHYSICAL EXAMINATION:  Vitals:    22 1215   BP: (!) 82/0   Pulse:    Resp:    Temp:      Body mass index is 23.5 kg/m².    Physical Exam   Constitutional: Well developed, well nourished, NAD.  ENT:  normal hearing.  Neck: Supple; trachea midline;   Cardiovascular: LVAD hum noted.   Lungs: Normal effort;  lungs anterior bilaterally clear to auscultation.  Abdomen: Soft, no masses, no hernias.  MS: No clubbing or cyanosis of nails noted;  Skin: No rashes, lesions, or ulcers; no nodules. Injection sites are ok. No lipo hypertropthy or atrophy.  Psychiatric: Good judgement and insight; normal mood and affect.  Neurological: Cranial nerves are grossly intact.  Foot: nails in good condition, no amputations noted.

## 2022-09-02 NOTE — PLAN OF CARE
Pt educated on fall risk overnight,pt remained free from falls/trauma/injury. Denies chest pain, SOB, palpitations, dizziness, pain, or discomfort. Plan of care reviewed with pt, all questions answered. LVAD numbers and dopplers WNL. NO any LFA's overnight. EEG with camera observation for 24 hrs. Bed locked in lowest position, call bell within reach, no acute distress noted, will continue to monitor.

## 2022-09-02 NOTE — PROGRESS NOTES
Admit Note     Met with patient and significant other to assess needs. Patient is a 37 y.o. single male, admitted for acute on chronic combined systolic and diastolic heart failure, NYHA class 4, uncontrolled diabetes mellitus and chronic kidney disease stage 3. Pt had LVAD implant over the summer and admitted due to syncopal episode.          Patient admitted to [facility]  on 9/1/2022 .  At this time, patient presents as alert and oriented x 4, good eye contact, calm and communicative.      Household/Family Systems     Patient resides with his significant other and three minor children at the pt's mothers house located at:      [address] [place] [address].        Support system includes significant other, mother and minor children.  Pt's children in the home are cared for by family when pt/significant other are in the hospital.     The pt has seven children, two with the pt's significant other.   The pt's other children live with the their mothers.     Pt reports his significant other Niharika will be the primary caregiver for LVAD.      Patients primary caregiver is self with support of significant other.      Pt's cell: [phone]    Emergency contacts:   Niharika Wright (significant other, drives) [phone]  Malou Dumont (mother, drives) [phone]    During admission, patient's caregiver plans to stay in patient's room.  Confirmed patient and patients caregivers do have access to reliable transportation.  Pt and significant other drive.     Cognitive Status/Learning     Patient reports reading ability as college and states patient does not have difficulty with reading, writing, hearing, comprehension, learning and memory. Pt reports continued seizure like activity, neuro is following and doing EEG currently.    Pt does have difficulty with eyesight due to diabetic issues.    Patient reports patient learns best by multiple methods.     Needed: No.   Highest education level: Attended  College/Technical School    Vocation/Disability   .  Working for Income: No  If no, reason not working: Demands of Treatment  Patient has applied for disability and has a  assisting with same.   Pt used to work as a CNA.   Pt's spouse is a homemaker.   Pt's mother receives a monthly income and assists pt with financial needs.     Adherence     Patient reports a high level of adherence to patients health care regimen.  Adherence counseling and education provided. Patient verbalizes understanding.    Substance Use    Patient reports the following substance usage.    Tobacco: Pt denies current use.   Alcohol: none, patient denies any use.  Illicit Drugs/Non-prescribed Medications: no current use.  Pt reports he stopped using Marijuana on 11/2021, cocaine 7/2021 and ecstasy on 9/2020.    Patient states clear understanding of the potential impact of substance use.  Substance abstinence/cessation counseling, education and resources provided and reviewed.     Services Utilizing/ADLS    Infusion Service: Prior to admission, patient utilizing? yes, [facility] full service.  Pt would like to use the same IV company if needed when discharged.  [phone], [phone]-fax  Home Health: Prior to admission, patient utilizing? no, Pt uses [facility].   DME: Prior to admission, no  Pulmonary/Cardiac Rehab: Prior to admission, no  Dialysis:  Prior to admission, no  Transplant Specialty Pharmacy:  Prior to admission, no.    Prior to admission, patient reports patient was somewhat  independent with ADLS.  Pt reports continued difficulty with his shoulder, he requires assistance for dressing and bathing.  Pt and significant other drive.   Patient reports patient is not able to care for self at this time due to compromised medical condition (as documented in medical record) and physical weakness..  Patient indicates a willingness to care for self once medically cleared to do  so.    Insurance/Medications    Insured by   Payer/Plan Subscr  Sex Relation Sub. Ins. ID Effective Group Num   1. MEDICAID - HE* JOSE J DAMICO 1985 Male Self [id number] 3/1/20 [id number]                                   [address] [address]      Primary Insurance (for UNOS reporting): Public Insurance - Medicaid  Secondary Insurance (for UNOS reporting): None    Patient reports patient is able to obtain and afford medications at this time and at time of discharge.    Living Will/Healthcare Power of     Patient states patient has a LW and/or HCPA. HCPA uploaded to EHR.  provided education regarding LW and HCPA and the completion of forms.    Coping/Mental Health    Patient is coping adequately with the aid of  family members. Patient indicates mental health difficulties.   Pt reports a history of depression, anxiety and anger and is taking Buspar and Lexapro. Pt is not involved in out pt counseling.   Pt reports he utilizes music and nature to help him relax.   Pt reports difficulty adjusting to LVAD and having to see driveline.    Worker provided support.   No additional needs reported.       Discharge Planning    At time of discharge, patient plans to return to patient's home under the care of self, significant other and mother.  Patients  family  will transport patient.  Per rounds today, expected discharge date has not been medically determined at this time. Patient and patients caregiver  verbalize understanding and are involved in treatment planning and discharge process.    Additional Concerns    Patient's caretaker denies additional needs and/or concerns at this time. Patient is being followed for needs, education, resources, information, emotional support, supportive counseling, and for supportive and skilled discharge plan of care.  providing ongoing psychosocial support, education, resources and d/c planning as needed.  SW remains available.  provided  resource list, patient choice, psychosocial and supportive counseling, resources, education, assistance and discharge planning with patient and caregiver involvement, ongoing SW availability and services as appropriate.  remains available. Patient's caregiver verbalizes understanding and agreement with information reviewed,  availability and how to access available resources as needed. Patient denies additional needs and/or concerns at this time. Patient verbalizes understanding and agreement with information reviewed, social work availability, and how to access available resources as needed.

## 2022-09-02 NOTE — ASSESSMENT & PLAN NOTE
Patient syncopized in the parking garage today and hit the back of his head on concrete floor.  - CTH negative  - Patient with history of seizures so will get 24 hour EEG and consult neurology.   - Device interrogation with Netli  - Interrogation of VAD function within normal limits without alarms or low flows.

## 2022-09-02 NOTE — ASSESSMENT & PLAN NOTE
-NICM  -Last 2D Echo 9/1/22: LVEF 10%, LVEDD 5.97 cm  -Euvolemic on examination today  -CVP: 7, SVO2: 56, CO: 4.28, CI: 2.12, SVR: 1476  -Current diuretic regimen: home dose of Lasix.  Can't take Bumex or Torsemide due to hives  -GDMT with home dose of Aldactone   -Patient was not evaluated for OHTx due to recently quit smoking in April 2022 at the time of workup  -Blood type A +  -2g Na dietary restriction, 1500 mL fluid restriction, strict I/Os

## 2022-09-02 NOTE — NURSING TRANSFER
Nursing Transfer Note      Reason patient is being transferred: LVAD care and cardiac monitoring    Transfer From: ED    Transfer via wheelchair    Transfer with cardiac monitoring, LVAD emergency bag and infusion pump    Transported by transport tech    Medicines sent: YES with [facility] gtts    Any special needs or follow-up needed: LVAD care and cardiac monitoring, EEG monitoring for 24 hrs.    Chart send with patient: Yes    Notified: spouse    Patient reassessed at: 9/1/2022 @ 815 pm     Upon arrival to floor: cardiac monitor applied, patient oriented to room, call bell in reach, and bed in lowest position, LVAD equipments inventory done

## 2022-09-02 NOTE — PROCEDURES
///EEG    Date/Time: 9/1/2022 10:18 AM  Performed by: Hunter Herman MD  Authorized by: DEYA Martinez     EXTENDED  ELECTROENCEPHALOGRAM  REPORT    DATE OF SERVICE: 9/1/22-9/2/22  EEG NUMBER: FH -1   REQUESTED BY:  Zenobia  LOCATION OF SERVICE:  [facility]    METHODOLOGY   Electroencephalographic (EEG) recording is with electrodes placed according to the International 10-20 placement system.  Thirty two (32) channels of digital signal (sampling rate of 512/sec) including T1 and T2 was simultaneously recorded from the scalp and may include  EKG, EMG, and/or eye monitors.  Recording band pass was 0.1 to 512 hz.  Digital video recording of the patient is simultaneously recorded with the EEG.  The patient is instructed report clinical symptoms which may occur during the recording session.  EEG and video recording is stored and archived in digital format.  Activation procedures which include photic stimulation, hyperventilation and instructing patients to perform simple task are done in selected patients.   The EEG is displayed on a monitor screen and can be reviewed using different montages.  Computer assisted analysis is employed to detect spike and electrographic seizure activity.   The entire record is submitted for computer analysis.  The entire recording is visually reviewed and the times identified by computer analysis as being spikes or seizures are reviewed again.  Compresses spectral analysis (CSA) is also performed on the activity recorded from each individual channel.  This is displayed as a power display of frequencies from 0 to 30 Hz over time.   The CSA is reviewed looking for asymmetries in power between homologous areas of the scalp and then compared with the original EEG recording.     [employer] software was also utilized in the review of this study.  This software suite analyzes the EEG recording in multiple domains.  Coherence and rhythmicity is computed to identify EEG sections which may contain  organized seizures.  Each channel undergoes analysis to detect presence of spike and sharp waves which have special and morphological characteristic of epileptic activity.  The routine EEG recording is converted from spacial into frequency domain.  This is then displayed comparing homologous areas to identify areas of significant asymmetry.  Algorithm to identify non-cortically generated artifact is used to separate eye movement, EMG and other artifact from the EEG.      RECORDING TIMES  Start on 9/1/22 at 17:52  Stop on 9/1/22 at 18:03  Start on 9/1/22 at 20:13   Stop on 9/2/22  at 07:00    A total of  12  hrs and  59 min of EEG recording was obtained.    EEG FINDINGS  Cap recording is extremely limited due to electrode and LVAD interference artifact. The record shows a fair  organization at rest, consisting of a 8-9 Hz posterior dominant rhythm with good  reactivity. There is mild bilateral beta activity. There is continuous diffuse theta and delta range backgroudn slwoing.     Drowsiness is characterized by attenuation of the background, vertex waves, and bilateral theta slowing. Sleep architecture is not seen.    Provocative maneuvers including hyperventilation and photic stimulation were not performed.     EKG recording shows a regular  rhythm.    There is no push button or clinical event.    IMPRESSION:  Study is significantly technically limited. Notable for mild to moderate  diffuse background slowing consistent with diffuse cerebral dysfunction and encephalopathy which may be on the basis of toxic, metabolic, or primary neuronal disorder.     Hunter Herman MD

## 2022-09-02 NOTE — PROGRESS NOTES
Pt AAO while sitting up in bed, milrinone infusing, with wife and children at bedside.  LVAD history interrogated with no alarms but several PI events w/ spd drops noted.  LVAD parameters currently WNL. Pt denies any needs at this time.  Encouraged pt to notify nurse if they have any questions, problems or concerns for LVAD coordinator.  Pt verbalized understanding and in agreement of plan. Will follow up with pt soon.

## 2022-09-02 NOTE — ASSESSMENT & PLAN NOTE
- CTH negative  -Neurology consulted and 24 hour EEG done and recommendations made to discontinue tramadol   -No seizures while EEG hooked up   -Plan to try to provoke seizure per Neurology recommendations   - Device interrogation with Wonolo done and per verbal report: only one episode of NSVT on 8/10  - Interrogation of VAD function within normal limits without alarms or low flows.

## 2022-09-02 NOTE — SUBJECTIVE & OBJECTIVE
"Interval History: Patient reports no new complaints this morning.  He reports he has not had an "episode" since admission.  CT of head negative.  Neurology recommended long term EEG which was in place at time of my exam.  Per Neurology report; they would like to place orders to do things to provoke seizure.  [facility] was scheduled as outpatient for today.  Will hold on procedure due to EEG cap.  Can consider doing early next week if he is still inpatient vs rescheduling as outpatient.  He appears euvolemic on exam.  CVP: 7, SVO2: 56, CO: 4.28, CI: 2.12  SVR: 1476.    Continuous Infusions:   milrinone 20mg/100ml D5W (200mcg/ml) 0.25 mcg/kg/min (09/02/22 0739)     Scheduled Meds:   busPIRone  7.5 mg Oral Daily    famotidine  40 mg Oral Daily    ferrous gluconate  324 mg Oral Daily with breakfast    furosemide  80 mg Oral Daily    gabapentin  100 mg Oral TID    insulin aspart U-100  12 Units Subcutaneous TIDWM    insulin detemir U-100  24 Units Subcutaneous QHS    INV ASPIRIN 100MG/PLACEBO  100 mg Oral Daily    levETIRAcetam  1,000 mg Oral BID    LIDOcaine  2 patch Transdermal Q24H    magnesium oxide  800 mg Oral BID    mirtazapine  15 mg Oral Nightly    omega-3 acid ethyl esters  2 g Oral BID    potassium chloride SA  40 mEq Oral TID    senna-docusate 8.6-50 mg  2 tablet Oral Daily    spironolactone  25 mg Oral Daily    warfarin  3 mg Oral Daily     PRN Meds:albuterol, dextrose 10%, dextrose 10%, glucagon (human recombinant), glucose, glucose, insulin aspart U-100, tiZANidine    Review of patient's allergies indicates:   Allergen Reactions    Aspirin Other (See Comments)     Mr. Thacker is enrolled in Dr. Paige's Alon Trial and cannot have any aspirin and/or aspirin-containing products. DO NOT cancel any orders for INV Aspirin 100 mg/Placebo. If you have questions, please contact Isabel @ [phone], [phone],[email or web address], secure chat or MS Teams message.    Bumex [bumetanide] Hives    Lactose Diarrhea     " Other reaction(s): Abdominal distension, gaseous    Torsemide Hives     Objective:     Vital Signs (Most Recent):  Temp: 97.7 °F (36.5 °C) (09/02/22 1119)  Pulse: 106 (09/02/22 1119)  Resp: 17 (09/02/22 1119)  BP: 103/78 (09/02/22 1119)  SpO2: 98 % (09/02/22 1119) Vital Signs (24h Range):  Temp:  [97.6 °F (36.4 °C)-98.3 °F (36.8 °C)] 97.7 °F (36.5 °C)  Pulse:  [103-129] 106  Resp:  [16-24] 17  SpO2:  [98 %-100 %] 98 %  BP: ()/(0-80) 103/78     Patient Vitals for the past 72 hrs (Last 3 readings):   Weight   09/02/22 0900 85.3 kg (188 lb 0.8 oz)   09/01/22 2036 86 kg (189 lb 9.5 oz)   09/01/22 1015 85.3 kg (188 lb)     Body mass index is 23.5 kg/m².      Intake/Output Summary (Last 24 hours) at 9/2/2022 1228  Last data filed at 9/2/2022 0900  Gross per 24 hour   Intake 480 ml   Output 300 ml   Net 180 ml       Hemodynamic Parameters:  CVP:  [7 mmHg] 7 mmHg    Telemetry: reviewed  Physical Exam  Constitutional:       Appearance: Normal appearance.   HENT:      Head: Normocephalic and atraumatic.   Eyes:      Extraocular Movements: Extraocular movements intact.      Pupils: Pupils are equal, round, and reactive to light.   Neck:      Comments: No JVD elevation  Cardiovascular:      Rate and Rhythm: Normal rate and regular rhythm.      Pulses: Normal pulses.      Heart sounds: Normal heart sounds. No murmur heard.     Comments: Smooth VAD hum    Pulmonary:      Effort: Pulmonary effort is normal.      Breath sounds: Normal breath sounds.   Abdominal:      General: Abdomen is flat. There is no distension.      Comments: DLES intact    Musculoskeletal:         General: Normal range of motion.      Cervical back: Neck supple.   Skin:     General: Skin is warm.      Capillary Refill: Capillary refill takes 2 to 3 seconds.   Neurological:      General: No focal deficit present.      Mental Status: He is alert and oriented to person, place, and time.           Significant Labs:  CBC:  Recent Labs   Lab 09/01/22  0930  09/01/22 1039 09/02/22  0416   WBC 11.14 10.19 12.72*   RBC 4.94 4.91 4.60   HGB 12.6* 12.4* 12.3*   HCT 39.7* 38.6* 36.2*    249 279   MCV 80* 79* 79*   MCH 25.5* 25.3* 26.7*   MCHC 31.7* 32.1 34.0     BNP:  Recent Labs   Lab 09/01/22 0930 09/02/22  0416   BNP 33 43     CMP:  Recent Labs   Lab 09/01/22 0930 09/01/22 1039 09/02/22 0416   * 235* 190*   CALCIUM 9.9 9.7 10.1   ALBUMIN 3.8 3.8 3.6   PROT 8.7* 8.9* 8.2   * 134* 138   K 3.7 4.5 4.1   CO2 25 22* 25   CL 99 100 103   BUN 17 17 17   CREATININE 1.7* 1.6* 1.3   ALKPHOS 126 131 117   ALT 35 40 32   AST 38 57* 36   BILITOT 0.4 0.4 0.5      Coagulation:   Recent Labs   Lab 09/01/22 0930 09/01/22 1039 09/02/22 0416   INR 2.0* 2.3* 1.9*   APTT  --   --  33.3*     LDH:  Recent Labs   Lab 09/01/22 0930 09/02/22 0416    233     Microbiology:  Microbiology Results (last 7 days)       ** No results found for the last 168 hours. **            I have reviewed all pertinent labs within the past 24 hours.    Estimated Creatinine Clearance: 93 mL/min (based on SCr of 1.3 mg/dL).    Diagnostic Results:  I have reviewed all pertinent imaging results/findings within the past 24 hours.

## 2022-09-02 NOTE — SUBJECTIVE & OBJECTIVE
"  Subjective:     Interval History: patient denies new episodes overnight, reporting intermittent abnormal sensation in bifrontal area described as "electrical sensation," persistent L 1-3 finger paresthesias and L foot paresthesias since LVAD placement in June    Current Neurological Medications: gabapentin 100 mg TID, keppra 1000 mg BID    Current Facility-Administered Medications   Medication Dose Route Frequency Provider Last Rate Last Admin    albuterol inhaler 2 puff  2 puff Inhalation Q4H PRN Jeff Spencer PA-C        busPIRone split tablet 7.5 mg  7.5 mg Oral Daily Jeff Spencer PA-C   7.5 mg at 09/02/22 0644    dextrose 10% bolus 125 mL  12.5 g Intravenous PRN Luke Cenac, NP        dextrose 10% bolus 250 mL  25 g Intravenous PRN Luke Cenac, NP        famotidine tablet 40 mg  40 mg Oral Daily Jeff Spencer PA-C   40 mg at 09/02/22 0827    ferrous gluconate tablet 324 mg  324 mg Oral Daily with breakfast Jeff Spencer PA-C   324 mg at 09/02/22 0829    furosemide tablet 80 mg  80 mg Oral Daily Jeff Spencer PA-C   80 mg at 09/02/22 0828    gabapentin capsule 100 mg  100 mg Oral TID Jeff Spencer PA-C   100 mg at 09/02/22 0828    glucagon (human recombinant) injection 1 mg  1 mg Intramuscular PRN Chris Cenac, NP        glucose chewable tablet 16 g  16 g Oral PRN Chris Marquezac, NP        glucose chewable tablet 24 g  24 g Oral PRN Chris Gomes, NP        insulin aspart U-100 pen 1-10 Units  1-10 Units Subcutaneous QID (AC + HS) PRN Chris Gomes, NP   6 Units at 09/02/22 0837    insulin aspart U-100 pen 12 Units  12 Units Subcutaneous TIDWM Chris Gomes NP   12 Units at 09/02/22 1224    insulin detemir U-100 pen 24 Units  24 Units Subcutaneous QHS Chris Gomes NP        INV ASPIRIN 100MG/PLACEBO 100 mg  100 mg Oral Daily Jeff Spencer PA-C   100 mg at 09/02/22 0900    levETIRAcetam tablet 1,000 mg  1,000 mg Oral BID Jeff Spencer PA-C   1,000 mg at 09/02/22 0827    LIDOcaine 5 % patch 2 patch  2 patch Transdermal Q24H " Kaila Reilly MD        magnesium oxide tablet 800 mg  800 mg Oral BID Jeff Spencer PA-C   800 mg at 09/02/22 0827    milrinone 20mg in D5W 100 mL infusion  0.25 mcg/kg/min (Order-Specific) Intravenous Continuous Jeff Spencer PA-C 7 mL/hr at 09/02/22 0739 0.25 mcg/kg/min at 09/02/22 0739    mirtazapine tablet 15 mg  15 mg Oral Nightly Jeff Spencer PA-C   15 mg at 09/01/22 2217    omega-3 acid ethyl esters capsule 2 g  2 g Oral BID Jeff Spencer PA-C   2 g at 09/02/22 0827    potassium chloride SA CR tablet 40 mEq  40 mEq Oral TID Jeff Spencer PA-C   40 mEq at 09/02/22 0829    senna-docusate 8.6-50 mg per tablet 2 tablet  2 tablet Oral Daily Jeff Spencer PA-C        spironolactone tablet 25 mg  25 mg Oral Daily Jeff Spencer PA-C   25 mg at 09/02/22 0827    tiZANidine tablet 2 mg  2 mg Oral Q8H PRN Jeff Spencer PA-C   2 mg at 09/02/22 0016    warfarin (COUMADIN) tablet 3 mg  3 mg Oral Daily Jeff Spencer PA-C           Review of Systems   Constitutional:  Negative for chills and fever.   HENT:  Negative for sore throat and trouble swallowing.    Eyes:  Negative for photophobia and visual disturbance.   Respiratory:  Negative for cough and shortness of breath.    Cardiovascular:  Negative for chest pain and leg swelling.   Gastrointestinal:  Negative for nausea and vomiting.   Musculoskeletal:  Negative for gait problem and neck stiffness.   Skin:  Negative for color change and pallor.   Neurological:  Positive for seizures, syncope and numbness. Negative for facial asymmetry, speech difficulty and weakness.   Psychiatric/Behavioral:  Positive for sleep disturbance. Negative for hallucinations.    Objective:     Vital Signs (Most Recent):  Temp: 97.7 °F (36.5 °C) (09/02/22 1119)  Pulse: 110 (09/02/22 1200)  Resp: 17 (09/02/22 1119)  BP: (!) 82/0 (09/02/22 1215)  SpO2: 98 % (09/02/22 1119)   Vital Signs (24h Range):  Temp:  [97.6 °F (36.4 °C)-98.3 °F (36.8 °C)] 97.7 °F (36.5 °C)  Pulse:  [103-129] 110  Resp:   [16-24] 17  SpO2:  [98 %-100 %] 98 %  BP: ()/(0-80) 82/0     Weight: 85.3 kg (188 lb 0.8 oz)  Body mass index is 23.5 kg/m².    Physical Exam  Vitals reviewed.   Constitutional:       General: He is not in acute distress.  HENT:      Head: Normocephalic.   Eyes:      Extraocular Movements: Extraocular movements intact.   Cardiovascular:      Rate and Rhythm: Tachycardia present.   Pulmonary:      Effort: Pulmonary effort is normal. No respiratory distress.   Neurological:      Mental Status: He is alert and oriented to person, place, and time.      Cranial Nerves: Cranial nerves 2-12 are intact.      Coordination: Finger-Nose-Finger Test normal.      Deep Tendon Reflexes:      Reflex Scores:       Tricep reflexes are 2+ on the right side and 2+ on the left side.       Bicep reflexes are 2+ on the right side and 2+ on the left side.       Brachioradialis reflexes are 2+ on the right side and 2+ on the left side.       Patellar reflexes are 2+ on the right side and 2+ on the left side.       Achilles reflexes are 2+ on the right side and 1+ on the left side.  Psychiatric:         Speech: Speech normal.       NEUROLOGICAL EXAMINATION:     MENTAL STATUS   Oriented to person, place, and time.   Attention: normal. Concentration: normal.   Speech: speech is normal   Level of consciousness: alert    CRANIAL NERVES   Cranial nerves II through XII intact.     MOTOR EXAM   Muscle bulk: normal  Overall muscle tone: normal    Strength   Right neck flexion: 5/5  Left neck flexion: 5/5  Right neck extension: 5/5  Left neck extension: 5/5  Right deltoid: 5/5  Left deltoid: 5/5  Right biceps: 5/5  Left biceps: 5/5  Right triceps: 5/5  Left triceps: 5/5  Right wrist flexion: 5/5  Left wrist flexion: 5/5  Right wrist extension: 5/5  Left wrist extension: 5/5  Right interossei: 5/5  Left interossei: 5/5  Right abdominals: 5/5  Left abdominals: 5/5  Right iliopsoas: 5/5  Left iliopsoas: 5/5  Right quadriceps: 5/5  Left quadriceps:  5/5  Right hamstrin/5  Left hamstrin/5  Right glutei: 5/5  Left glutei: 5/5  Right anterior tibial: 5/5  Left anterior tibial: 5/5  Right posterior tibial: 5/5  Left posterior tibial: 5/5  Right peroneal: 5/5  Left peroneal: 5/5  Right gastroc: 5/5  Left gastroc: 5/5    REFLEXES     Reflexes   Right brachioradialis: 2+  Left brachioradialis: 2+  Right biceps: 2+  Left biceps: 2+  Right triceps: 2+  Left triceps: 2+  Right patellar: 2+  Left patellar: 2+  Right achilles: 2+  Left achilles: 1+  Right ankle clonus: absent  Left ankle clonus: absent    SENSORY EXAM   Right arm light touch: normal  Left arm light touch: decreased from fingers  Right leg light touch: normal  Left leg light touch: decreased from knee  Sensory deficit distribution on left: median       Paresthesias in L 1-3 digits  Paresthesias in L foot up to ankle  Decreased sensation from L knee to L ankle     GAIT AND COORDINATION      Coordination   Finger to nose coordination: normal    Significant Labs: All pertinent lab results from the past 24 hours have been reviewed.    Significant Imaging: I have reviewed all pertinent imaging results/findings within the past 24 hours.

## 2022-09-02 NOTE — CONSULTS
"[facility] - [facility] Step[facility]  Endocrinology  Diabetes Consult Note    Consult Requested by: Dalia Crum MD   Reason for admit: Awareness alteration, transient    HISTORY OF PRESENT ILLNESS:  Reason for Consult: Management of T2DM, Hyperglycemia     Surgical Procedure and Date: LVAD 06/29/2022    Diabetes diagnosis year: 2022    Home Diabetes Medications:    Levemir 22 units HS.   Novolog 10 units TIDWM.     How often checking glucose at home? 1-3 x day   BG readings on regimen: 100's - 200s  Hypoglycemia on the regimen?  No  Missed doses on regimen?  No    Diabetes Complications include:     Hyperglycemia     Complicating diabetes co morbidities:   History of MI, CHF, and CKD      HPI:   Patient is a 37 y.o. 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 He also completed a course of IV Abx for staph epi. He was weaned off  but he had to restarted due to RVF. He was eventually transitioned to milrinone (secondary to  shortage) now on 0.25 mcg/kg/min. He underwent echo this AM and was seen in [facility] clinic today. He did undergo his echo this am however he just informed us that while on his way tot  clinic he had a seizure episode and hit his head. CT scan in the ED negative for intracranial hemorrhage. He reports being lightheaded before losing consciousness and being confused upon waking. He also reports a similar episode earlier this week when he was eating out at restaurant and he was reported by his wfe to "doze off" and not remember and he also was confused upon waking from that episode as well. Denies biting his tongue. VAD speed is at " "5100 rpm. No VAD alarms noted on interrogation occasional PI events . BP is 72 mm of Hg. DLES is a "1". INR is therapeutic at 2.05. LDh is at baseline. Endocrinology consulted on 2022 to manage glycemic control.     Lab Results   Component Value Date    HGBA1C 9.2 (H) 2022           Interval HPI:   Overnight events:   BG trending downwards to goal since admission. Patient endorses compliance with home DM medications. Patient endorses non-compliance with ADA diet at home. Endocrinology will continue to follow, and manage glycemic control while inpatient.   Diet [facility]; Fluid - 1500mL       Eatin%  Nausea: No  Hypoglycemia and intervention: No  Fever: No  TPN and/or TF: No  If yes, type of TF/TPN and rate: None    PMH, PSH, FH, SH updated and reviewed     Review of Systems  Constitutional: Negative for weight changes.  Eyes: Negative for visual disturbance.  Respiratory: Negative for cough.   Cardiovascular: Negative for chest pain.  Gastrointestinal: Negative for nausea.  Endocrine: Positive for polyuria, polydipsia.  Musculoskeletal: Ngative for back pain.  Skin: Negative for rash.  Neurological: Negative for syncope.  Psychiatric/Behavioral: Negative for depression.    Current Medications and/or Treatments Impacting Glycemic Control  Immunotherapy:    Immunosuppressants       None          Steroids:   Hormones (From admission, onward)      None          Pressors:    Autonomic Drugs (From admission, onward)      None          Hyperglycemia/Diabetes Medications:   Antihyperglycemics (From admission, onward)      Start     Stop Route Frequency Ordered    22 2100  insulin detemir U-100 pen 24 Units         -- SubQ Nightly 22 1100    22 1130  insulin aspart U-100 pen 12 Units         -- SubQ 3 times daily with meals 22 1100    22 1844  insulin aspart U-100 pen 1-10 Units         -- SubQ Before meals & nightly PRN 22 1745             PHYSICAL " EXAMINATION:  Vitals:    09/02/22 1215   BP: (!) 82/0   Pulse:    Resp:    Temp:      Body mass index is 23.5 kg/m².    Physical Exam   Constitutional: Well developed, well nourished, NAD.  ENT:  normal hearing.  Neck: Supple; trachea midline;   Cardiovascular: LVAD hum noted.   Lungs: Normal effort; lungs anterior bilaterally clear to auscultation.  Abdomen: Soft, no masses, no hernias.  MS: No clubbing or cyanosis of nails noted;  Skin: No rashes, lesions, or ulcers; no nodules. Injection sites are ok. No lipo hypertropthy or atrophy.  Psychiatric: Good judgement and insight; normal mood and affect.  Neurological: Cranial nerves are grossly intact.  Foot: nails in good condition, no amputations noted.        Labs Reviewed and Include   Recent Labs   Lab 09/02/22  0416   *   CALCIUM 10.1   ALBUMIN 3.6   PROT 8.2      K 4.1   CO2 25      BUN 17   CREATININE 1.3   ALKPHOS 117   ALT 32   AST 36   BILITOT 0.5     Lab Results   Component Value Date    WBC 12.72 (H) 09/02/2022    HGB 12.3 (L) 09/02/2022    HCT 36.2 (L) 09/02/2022    MCV 79 (L) 09/02/2022     09/02/2022     No results for input(s): TSH, FREET4 in the last 168 hours.  Lab Results   Component Value Date    HGBA1C 7.4 (H) 09/01/2022       Nutritional status:   Body mass index is 23.5 kg/m².  Lab Results   Component Value Date    ALBUMIN 3.6 09/02/2022    ALBUMIN 3.8 09/01/2022    ALBUMIN 3.8 09/01/2022     Lab Results   Component Value Date    PREALBUMIN 33 09/01/2022    PREALBUMIN 28 08/22/2022    PREALBUMIN 30 08/18/2022       Estimated Creatinine Clearance: 93 mL/min (based on SCr of 1.3 mg/dL).    Accu-Checks  Recent Labs     09/01/22  1929 09/02/22  0020 09/02/22  0749 09/02/22  1118   POCTGLUCOSE 286* 167* 264* 129*        ASSESSMENT and PLAN    * Awareness alteration, transient  Managed per primary team  Avoid hypoglycemia        Type 2 diabetes mellitus with hyperglycemia  BG goal 140 -180     - Increase Levemir to 24 units  HS. FBG above goal this AM.   - Increase Novolog to 12 units TIDWM. Prandial BG excursions noted.   - Moderate Dose SQ Insulin Correction Scale.  - BG Monitoring AC/HS     ** Please call Endocrine for any BG related issues **  ** Please notify Endocrine for any change and/or advance in diet**  Lab Results   Component Value Date    HGBA1C 7.4 (H) 09/01/2022       Discharge Planning:   TBD. Please notify endocrinology prior to discharge.       Anemia of chronic disease  May affect accuracy of HbA1c results.                    Chris Gomes, NP  Endocrinology  [facility] - [facility] Step[facility]

## 2022-09-02 NOTE — PLAN OF CARE
Recommendations     1. Continue current cardiac diet with fluid per MD- add diabetic restrictions.   2. RD following.     Goals: Continue meeting % EEN/EPN by next RD visit.  Nutrition Goal Status: new  Communication of RD Recs:  (POC)

## 2022-09-03 LAB
ALLENS TEST: ABNORMAL
ALLENS TEST: ABNORMAL
ANION GAP SERPL CALC-SCNC: 7 MMOL/L (ref 8–16)
APTT BLDCRRT: 29.5 SEC (ref 21–32)
BASOPHILS # BLD AUTO: 0.03 K/UL (ref 0–0.2)
BASOPHILS NFR BLD: 0.3 % (ref 0–1.9)
BUN SERPL-MCNC: 13 MG/DL (ref 6–20)
CALCIUM SERPL-MCNC: 9.5 MG/DL (ref 8.7–10.5)
CHLORIDE SERPL-SCNC: 103 MMOL/L (ref 95–110)
CO2 SERPL-SCNC: 26 MMOL/L (ref 23–29)
CREAT SERPL-MCNC: 1.3 MG/DL (ref 0.5–1.4)
DIFFERENTIAL METHOD: ABNORMAL
EOSINOPHIL # BLD AUTO: 0.2 K/UL (ref 0–0.5)
EOSINOPHIL NFR BLD: 1.6 % (ref 0–8)
ERYTHROCYTE [DISTWIDTH] IN BLOOD BY AUTOMATED COUNT: 21 % (ref 11.5–14.5)
EST. GFR  (NO RACE VARIABLE): >60 ML/MIN/1.73 M^2
GLUCOSE SERPL-MCNC: 280 MG/DL (ref 70–110)
HCO3 UR-SCNC: 27.6 MMOL/L (ref 24–28)
HCO3 UR-SCNC: 28.2 MMOL/L (ref 24–28)
HCT VFR BLD AUTO: 35.7 % (ref 40–54)
HGB BLD-MCNC: 11.4 G/DL (ref 14–18)
IMM GRANULOCYTES # BLD AUTO: 0.02 K/UL (ref 0–0.04)
IMM GRANULOCYTES NFR BLD AUTO: 0.2 % (ref 0–0.5)
INR PPP: 1.6 (ref 0.8–1.2)
LACTATE SERPL-SCNC: 1.5 MMOL/L (ref 0.5–2.2)
LDH SERPL L TO P-CCNC: 248 U/L (ref 110–260)
LYMPHOCYTES # BLD AUTO: 2.3 K/UL (ref 1–4.8)
LYMPHOCYTES NFR BLD: 24.3 % (ref 18–48)
MAGNESIUM SERPL-MCNC: 2 MG/DL (ref 1.6–2.6)
MCH RBC QN AUTO: 25.4 PG (ref 27–31)
MCHC RBC AUTO-ENTMCNC: 31.9 G/DL (ref 32–36)
MCV RBC AUTO: 80 FL (ref 82–98)
MONOCYTES # BLD AUTO: 0.7 K/UL (ref 0.3–1)
MONOCYTES NFR BLD: 7.8 % (ref 4–15)
NEUTROPHILS # BLD AUTO: 6.1 K/UL (ref 1.8–7.7)
NEUTROPHILS NFR BLD: 65.8 % (ref 38–73)
NRBC BLD-RTO: 0 /100 WBC
PCO2 BLDA: 53.9 MMHG (ref 35–45)
PCO2 BLDA: 57.4 MMHG (ref 35–45)
PH SMN: 7.29 [PH] (ref 7.35–7.45)
PH SMN: 7.33 [PH] (ref 7.35–7.45)
PLATELET # BLD AUTO: 306 K/UL (ref 150–450)
PMV BLD AUTO: 9.6 FL (ref 9.2–12.9)
PO2 BLDA: 23 MMHG (ref 40–60)
PO2 BLDA: 25 MMHG (ref 40–60)
POC BE: 1 MMOL/L
POC BE: 2 MMOL/L
POC SATURATED O2: 33 % (ref 95–100)
POC SATURATED O2: 40 % (ref 95–100)
POC TCO2: 29 MMOL/L (ref 24–29)
POC TCO2: 30 MMOL/L (ref 24–29)
POCT GLUCOSE: 150 MG/DL (ref 70–110)
POCT GLUCOSE: 185 MG/DL (ref 70–110)
POTASSIUM SERPL-SCNC: 4.2 MMOL/L (ref 3.5–5.1)
PROTHROMBIN TIME: 16.1 SEC (ref 9–12.5)
RBC # BLD AUTO: 4.49 M/UL (ref 4.6–6.2)
SAMPLE: ABNORMAL
SAMPLE: ABNORMAL
SITE: ABNORMAL
SITE: ABNORMAL
SODIUM SERPL-SCNC: 136 MMOL/L (ref 136–145)
WBC # BLD AUTO: 9.3 K/UL (ref 3.9–12.7)

## 2022-09-03 PROCEDURE — 95714 VEEG EA 12-26 HR UNMNTR: CPT

## 2022-09-03 PROCEDURE — 95720 PR EEG, W/VIDEO, CONT RECORD, I&R, >12<26 HRS: ICD-10-PCS | Mod: ,,, | Performed by: PSYCHIATRY & NEUROLOGY

## 2022-09-03 PROCEDURE — 25000003 PHARM REV CODE 250: Performed by: PHYSICIAN ASSISTANT

## 2022-09-03 PROCEDURE — 25000003 PHARM REV CODE 250: Performed by: STUDENT IN AN ORGANIZED HEALTH CARE EDUCATION/TRAINING PROGRAM

## 2022-09-03 PROCEDURE — 99233 PR SUBSEQUENT HOSPITAL CARE,LEVL III: ICD-10-PCS | Mod: ,,, | Performed by: INTERNAL MEDICINE

## 2022-09-03 PROCEDURE — 85025 COMPLETE CBC W/AUTO DIFF WBC: CPT | Performed by: PHYSICIAN ASSISTANT

## 2022-09-03 PROCEDURE — 99233 SBSQ HOSP IP/OBS HIGH 50: CPT | Mod: ,,, | Performed by: PSYCHIATRY & NEUROLOGY

## 2022-09-03 PROCEDURE — 99232 SBSQ HOSP IP/OBS MODERATE 35: CPT | Mod: ,,, | Performed by: NURSE PRACTITIONER

## 2022-09-03 PROCEDURE — 99233 SBSQ HOSP IP/OBS HIGH 50: CPT | Mod: ,,, | Performed by: INTERNAL MEDICINE

## 2022-09-03 PROCEDURE — 93750 PR INTERROGATE VENT ASSIST DEV, IN PERSON, W PHYSICIAN ANALYSIS: ICD-10-PCS | Mod: ,,, | Performed by: INTERNAL MEDICINE

## 2022-09-03 PROCEDURE — 63600175 PHARM REV CODE 636 W HCPCS: Performed by: PHYSICIAN ASSISTANT

## 2022-09-03 PROCEDURE — 80048 BASIC METABOLIC PNL TOTAL CA: CPT | Performed by: PHYSICIAN ASSISTANT

## 2022-09-03 PROCEDURE — 80177 DRUG SCRN QUAN LEVETIRACETAM: CPT

## 2022-09-03 PROCEDURE — 99900035 HC TECH TIME PER 15 MIN (STAT)

## 2022-09-03 PROCEDURE — 85730 THROMBOPLASTIN TIME PARTIAL: CPT | Performed by: PHYSICIAN ASSISTANT

## 2022-09-03 PROCEDURE — 83605 ASSAY OF LACTIC ACID: CPT

## 2022-09-03 PROCEDURE — 27000248 HC VAD-ADDITIONAL DAY

## 2022-09-03 PROCEDURE — 83615 LACTATE (LD) (LDH) ENZYME: CPT | Performed by: PHYSICIAN ASSISTANT

## 2022-09-03 PROCEDURE — 83735 ASSAY OF MAGNESIUM: CPT | Performed by: PHYSICIAN ASSISTANT

## 2022-09-03 PROCEDURE — 99233 PR SUBSEQUENT HOSPITAL CARE,LEVL III: ICD-10-PCS | Mod: ,,, | Performed by: PSYCHIATRY & NEUROLOGY

## 2022-09-03 PROCEDURE — 95720 EEG PHY/QHP EA INCR W/VEEG: CPT | Mod: ,,, | Performed by: PSYCHIATRY & NEUROLOGY

## 2022-09-03 PROCEDURE — 93750 INTERROGATION VAD IN PERSON: CPT | Mod: ,,, | Performed by: INTERNAL MEDICINE

## 2022-09-03 PROCEDURE — 20600001 HC STEP DOWN PRIVATE ROOM

## 2022-09-03 PROCEDURE — 85610 PROTHROMBIN TIME: CPT | Performed by: PHYSICIAN ASSISTANT

## 2022-09-03 PROCEDURE — 99232 PR SUBSEQUENT HOSPITAL CARE,LEVL II: ICD-10-PCS | Mod: ,,, | Performed by: NURSE PRACTITIONER

## 2022-09-03 RX ORDER — WARFARIN SODIUM 5 MG/1
5 TABLET ORAL DAILY
Status: DISCONTINUED | OUTPATIENT
Start: 2022-09-03 | End: 2022-09-06 | Stop reason: HOSPADM

## 2022-09-03 RX ORDER — INSULIN ASPART 100 [IU]/ML
10 INJECTION, SOLUTION INTRAVENOUS; SUBCUTANEOUS
Status: DISCONTINUED | OUTPATIENT
Start: 2022-09-03 | End: 2022-09-05

## 2022-09-03 RX ORDER — DIPHENHYDRAMINE HCL 50 MG
50 CAPSULE ORAL ONCE
Status: COMPLETED | OUTPATIENT
Start: 2022-09-04 | End: 2022-09-04

## 2022-09-03 RX ORDER — TRAMADOL HYDROCHLORIDE 50 MG/1
50 TABLET ORAL ONCE
Status: COMPLETED | OUTPATIENT
Start: 2022-09-04 | End: 2022-09-04

## 2022-09-03 RX ADMIN — INSULIN ASPART 3 UNITS: 100 INJECTION, SOLUTION INTRAVENOUS; SUBCUTANEOUS at 10:09

## 2022-09-03 RX ADMIN — GABAPENTIN 100 MG: 100 CAPSULE ORAL at 09:09

## 2022-09-03 RX ADMIN — INSULIN ASPART 12 UNITS: 100 INJECTION, SOLUTION INTRAVENOUS; SUBCUTANEOUS at 12:09

## 2022-09-03 RX ADMIN — POTASSIUM CHLORIDE 40 MEQ: 1500 TABLET, EXTENDED RELEASE ORAL at 10:09

## 2022-09-03 RX ADMIN — MILRINONE LACTATE IN DEXTROSE 0.25 MCG/KG/MIN: 200 INJECTION, SOLUTION INTRAVENOUS at 01:09

## 2022-09-03 RX ADMIN — Medication 800 MG: at 09:09

## 2022-09-03 RX ADMIN — DIPHENHYDRAMINE HYDROCHLORIDE 50 MG: 50 CAPSULE ORAL at 06:09

## 2022-09-03 RX ADMIN — WARFARIN SODIUM 5 MG: 5 TABLET ORAL at 03:09

## 2022-09-03 RX ADMIN — POTASSIUM CHLORIDE 40 MEQ: 1500 TABLET, EXTENDED RELEASE ORAL at 03:09

## 2022-09-03 RX ADMIN — BUSPIRONE HYDROCHLORIDE 7.5 MG: 5 TABLET ORAL at 06:09

## 2022-09-03 RX ADMIN — Medication 800 MG: at 10:09

## 2022-09-03 RX ADMIN — GABAPENTIN 100 MG: 100 CAPSULE ORAL at 03:09

## 2022-09-03 RX ADMIN — OMEGA-3-ACID ETHYL ESTERS 2 G: 1 CAPSULE, LIQUID FILLED ORAL at 09:09

## 2022-09-03 RX ADMIN — INSULIN ASPART 12 UNITS: 100 INJECTION, SOLUTION INTRAVENOUS; SUBCUTANEOUS at 07:09

## 2022-09-03 RX ADMIN — POTASSIUM CHLORIDE 40 MEQ: 1500 TABLET, EXTENDED RELEASE ORAL at 09:09

## 2022-09-03 RX ADMIN — GABAPENTIN 100 MG: 100 CAPSULE ORAL at 10:09

## 2022-09-03 RX ADMIN — MIRTAZAPINE 15 MG: 15 TABLET, FILM COATED ORAL at 10:09

## 2022-09-03 RX ADMIN — OMEGA-3-ACID ETHYL ESTERS 2 G: 1 CAPSULE, LIQUID FILLED ORAL at 10:09

## 2022-09-03 RX ADMIN — SPIRONOLACTONE 25 MG: 25 TABLET, FILM COATED ORAL at 09:09

## 2022-09-03 RX ADMIN — FUROSEMIDE 80 MG: 80 TABLET ORAL at 09:09

## 2022-09-03 RX ADMIN — FERROUS GLUCONATE 324 MG: 324 TABLET ORAL at 09:09

## 2022-09-03 RX ADMIN — TRAMADOL HYDROCHLORIDE 50 MG: 50 TABLET, COATED ORAL at 06:09

## 2022-09-03 RX ADMIN — LEVETIRACETAM 1000 MG: 500 TABLET, FILM COATED ORAL at 09:09

## 2022-09-03 RX ADMIN — INSULIN ASPART 4 UNITS: 100 INJECTION, SOLUTION INTRAVENOUS; SUBCUTANEOUS at 05:09

## 2022-09-03 RX ADMIN — Medication 100 MG: at 09:09

## 2022-09-03 RX ADMIN — INSULIN ASPART 10 UNITS: 100 INJECTION, SOLUTION INTRAVENOUS; SUBCUTANEOUS at 05:09

## 2022-09-03 RX ADMIN — FAMOTIDINE 40 MG: 20 TABLET ORAL at 09:09

## 2022-09-03 RX ADMIN — INSULIN ASPART 2 UNITS: 100 INJECTION, SOLUTION INTRAVENOUS; SUBCUTANEOUS at 07:09

## 2022-09-03 NOTE — PROGRESS NOTES
09/03/2022  John Alaniz    Current provider:  Dalia Crum MD    Device interrogation:  TXP LVAD INTERROGATIONS 9/3/2022 9/3/2022 9/3/2022 9/3/2022 9/2/2022 9/2/2022 9/2/2022   Type HeartMate3 HeartMate3 HeartMate3 HeartMate3 HeartMate3 HeartMate3 HeartMate3   Flow 3.6 3.5 3.4 3.4 3.4 3.9 5100   Speed 5100 5100 5100 5100 5100 5100 3.3   PI 8.3 9.5 7.9 8 6.4 6.0 8.4   Power (Serna) 3.8 3.9 3.8 3.9 3.8 3.6 3.8   LSL 4700 4700 4700 4700 4700 - -   Pulsatility Intermittent pulse Pulse - - - Intermittent pulse Intermittent pulse          Rounded on Kevan Queen to ensure all mechanical assist device settings (IABP or VAD) were appropriate and all parameters were within limits.  I was able to ensure all back up equipment was present, the staff had no issues, and the Perfusion Department daily rounding was complete.      For implantable VADs: Interrogation of Ventricular assist device was performed with analysis of device parameters and review of device function. I have personally reviewed the interrogation findings and agree with findings as stated.     In emergency, the nursing units have been notified to contact the perfusion department either by:  Calling [phone] from 630am to 4pm Mon thru Fri, utilizing the On-Call Finder functionality of Epic and searching for Perfusion, or by contacting the hospital  from 4pm to 630am and on weekends and asking to speak with the perfusionist on call.    3:17 PM

## 2022-09-03 NOTE — SUBJECTIVE & OBJECTIVE
"Interval HPI:   Overnight events:  BG stable while on current SQ mdi insulin regimen. Fasting BG elevated this AM. FreeStyle William sample provided to patient. Will work on PA for discharge rx. Endocrinology will continue to follow, and manage glycemic control while inpatient.   Diet [facility]; Fluid - 1500mL       Eatin%  Nausea: No  Hypoglycemia and intervention: No  Fever: No  TPN and/or TF: No  If yes, type of TF/TPN and rate: None    BP (!) 78/0 (BP Location: Left arm, Patient Position: Sitting)   Pulse 100   Temp 98.1 °F (36.7 °C) (Oral)   Resp 20   Ht 6' 3" (1.905 m)   Wt 87.8 kg (193 lb 9 oz)   SpO2 99%   BMI 24.19 kg/m²     Labs Reviewed and Include    Recent Labs   Lab 22  0507   *   CALCIUM 9.5      K 4.2   CO2 26      BUN 13   CREATININE 1.3     Lab Results   Component Value Date    WBC 9.30 2022    HGB 11.4 (L) 2022    HCT 35.7 (L) 2022    MCV 80 (L) 2022     2022     No results for input(s): TSH, FREET4 in the last 168 hours.  Lab Results   Component Value Date    HGBA1C 7.4 (H) 2022       Nutritional status:   Body mass index is 24.19 kg/m².  Lab Results   Component Value Date    ALBUMIN 3.6 2022    ALBUMIN 3.8 2022    ALBUMIN 3.8 2022     Lab Results   Component Value Date    PREALBUMIN 32 2022    PREALBUMIN 33 2022    PREALBUMIN 28 2022       Estimated Creatinine Clearance: 93 mL/min (based on SCr of 1.3 mg/dL).    Accu-Checks  Recent Labs     22  1929 22  0020 22  0749 22  1118 22  1618 22  2103 22  0755 22  1206   POCTGLUCOSE 286* 167* 264* 129* 169* 198* 185* 150*       Current Medications and/or Treatments Impacting Glycemic Control  Immunotherapy:    Immunosuppressants       None          Steroids:   Hormones (From admission, onward)      None          Pressors:    Autonomic Drugs (From admission, onward)      None "          Hyperglycemia/Diabetes Medications:   Antihyperglycemics (From admission, onward)      Start     Stop Route Frequency Ordered    09/02/22 2100  insulin detemir U-100 pen 24 Units         -- SubQ Nightly 09/02/22 1100    09/02/22 1130  insulin aspart U-100 pen 12 Units         -- SubQ 3 times daily with meals 09/02/22 1100    09/01/22 1844  insulin aspart U-100 pen 1-10 Units         -- SubQ Before meals & nightly PRN 09/01/22 3024

## 2022-09-03 NOTE — ASSESSMENT & PLAN NOTE
"37 y.o. male with [facility] s/p LVAD (6/2022) presents after syncope vs. seizure event in parking garage this morning. Pt reports he felt a warning funny sensation in head then lost consciousness, fell backwards hitting his head on the concrete. He proceeded to his scheduled Cards appt and was back at cognitive baseline. LVAD interrogated without associated alarms, settings not adjusted. He was advised to present to ED where CTH without contrast was unremarkable for acute intracranial pathology. He is currently taking Keppra 1 g BID, which was started during LVAD admission for recurrent episodes of loss of awareness with L TIRDA on EEG and encephalomalacia on imaging. Recently seen by Dr. Devi who considered EMU admission in October for event characterization; however, patient unable to be admitted to the EMU service given continuous milrinone infusion.     9/2 further history of episodes obtained. He reports feeling an "electrical sensation" in bifrontal area, which sometimes precedes an event but not always associated with an event. This sensation is worse when trying to concentrate and with eye movement. Prior to an event, he will have weakness and his surroundings appear in slow motion. His significant other reports increase in events with lack of sleep and increased stress. He has been having these events every other day. He has gone a maximum of 4 days without an event and noted he was not taking his tramadol at that time. No associated postictal state with every event, reports postictal symptoms after 2 events.    Recommendations:  --admitted to Cards service, LTM EEG ordered, seizure provoking methods begun  --Dc'd Keppra 1 g BID for seizure provocation  --continue current dose of gabapentin, once EEG is completed recommend increasing dose to 300 mg TID for neuropathic pain in L hand and foot  --Sleep deprivation performed and tramadol/benadryl given this morning (9/3) for seizure provocation- plan to repeat " tonight. Following this EEG monitoring session during this hospitalization, recommend discontinuing tramadol and other medications that lower seizure threshold.  --continue correction of electrolyte derangements   --seizure precautions

## 2022-09-03 NOTE — PROGRESS NOTES
[facility] - Cardiology Stepdown  Heart Transplant  Progress Note    Patient Name: Kevan Queen  MRN: [id number]  Admission Date: 9/1/2022  Hospital Length of Stay: 2 days  Attending Physician: Dalia Crum MD  Primary Care Provider: ORALIA Cline  Principal Problem:Awareness alteration, transient    Subjective:     Interval History: NAEON. Patient denied seizure like activity or headaches. EEG cap replaced and now functional. Neurology inducing seizures today. Pain over VAD driveline site is better controlled.    CVP this morning was 7.  SVO2 33, repeat 40.  CO 3.39 CI 1.7  SVR 1793    Patient appeared well, all extremities were warm.  PICC has been in for 59 days but dressing CDI, no signs of infection.    Continuous Infusions:   milrinone 20mg/100ml D5W (200mcg/ml) 0.25 mcg/kg/min (09/02/22 2227)     Scheduled Meds:   busPIRone  7.5 mg Oral Daily    famotidine  40 mg Oral Daily    ferrous gluconate  324 mg Oral Daily with breakfast    furosemide  80 mg Oral Daily    gabapentin  100 mg Oral TID    insulin aspart U-100  12 Units Subcutaneous TIDWM    insulin detemir U-100  24 Units Subcutaneous QHS    INV ASPIRIN 100MG/PLACEBO  100 mg Oral Daily    levETIRAcetam  1,000 mg Oral BID    LIDOcaine  2 patch Transdermal Q24H    magnesium oxide  800 mg Oral BID    mirtazapine  15 mg Oral Nightly    omega-3 acid ethyl esters  2 g Oral BID    potassium chloride SA  40 mEq Oral TID    senna-docusate 8.6-50 mg  2 tablet Oral Daily    spironolactone  25 mg Oral Daily    warfarin  3 mg Oral Daily     PRN Meds:acetaminophen, albuterol, dextrose 10%, dextrose 10%, glucagon (human recombinant), glucose, glucose, insulin aspart U-100, lorazepam    Review of patient's allergies indicates:   Allergen Reactions    Aspirin Other (See Comments)     Mr. Thacker is enrolled in Dr. Paige's Alon Trial and cannot have any aspirin and/or aspirin-containing products. DO NOT cancel any orders for INV Aspirin 100 mg/Placebo. If you have  Patient scheduled for colonoscopy     Indication for procedure. Irritable bowel syndrome with constipation    Referring Provider. Roldan Maria MD    ? No     Arrival time verified? 715 am     Facility location verified? [address] [facility], 5th floor     Instructions given regarding prep and procedure    Prep Type Golytely.     Are you taking any anticoagulants or blood thinners? Denies     Instructions given? Yes     Electronic implanted devices? Denies     Pre procedure teaching completed? Yes    Transportation from procedure? Yes, dad      H&P / Pre op physical completed? N/A    Chepe Fu RN             questions, please contact Isabel @ [phone], [phone],[email or web address], secure chat or MS Teams message.    Bumex [bumetanide] Hives    Lactose Diarrhea     Other reaction(s): Abdominal distension, gaseous    Torsemide Hives     Objective:     Vital Signs (Most Recent):  Temp: 97.8 °F (36.6 °C) (09/03/22 0752)  Pulse: 100 (09/03/22 0752)  Resp: 18 (09/03/22 0752)  BP: 101/68 (09/03/22 0752)  SpO2: 99 % (09/03/22 0752) Vital Signs (24h Range):  Temp:  [97.7 °F (36.5 °C)-98.4 °F (36.9 °C)] 97.8 °F (36.6 °C)  Pulse:  [] 100  Resp:  [16-20] 18  SpO2:  [98 %-100 %] 99 %  BP: ()/(0-81) 101/68     Patient Vitals for the past 72 hrs (Last 3 readings):   Weight   09/03/22 0752 87.8 kg (193 lb 9 oz)   09/02/22 1400 85.3 kg (188 lb 0.8 oz)   09/02/22 0900 85.3 kg (188 lb 0.8 oz)     Body mass index is 24.19 kg/m².      Intake/Output Summary (Last 24 hours) at 9/3/2022 0855  Last data filed at 9/3/2022 0500  Gross per 24 hour   Intake 960 ml   Output 2125 ml   Net -1165 ml       Hemodynamic Parameters:  CVP:  [7 mmHg] 7 mmHg    Telemetry: Reviewed    Physical Exam  Constitutional:       Appearance: Normal appearance.   HENT:      Head: Normocephalic and atraumatic.   Eyes:      Extraocular Movements: Extraocular movements intact.      Pupils: Pupils are equal, round, and reactive to light.   Neck:      Comments: No JVD elevation  Cardiovascular:      Rate and Rhythm: Normal rate and regular rhythm.      Pulses: Normal pulses.      Heart sounds: Normal heart sounds. No murmur heard.     Comments: Smooth VAD hum    Pulmonary:      Effort: Pulmonary effort is normal.      Breath sounds: Normal breath sounds.   Abdominal:      General: Abdomen is flat. There is no distension.      Comments: DLES intact    Musculoskeletal:         General: Normal range of motion.      Cervical back: Neck supple.   Skin:     General: Skin is warm.   Neurological:      General: No focal deficit present.      Mental Status: He is  alert and oriented to person, place, and time.       Significant Labs:  CBC:  Recent Labs   Lab 09/01/22 1039 09/02/22 0416 09/03/22  0507   WBC 10.19 12.72* 9.30   RBC 4.91 4.60 4.49*   HGB 12.4* 12.3* 11.4*   HCT 38.6* 36.2* 35.7*    279 306   MCV 79* 79* 80*   MCH 25.3* 26.7* 25.4*   MCHC 32.1 34.0 31.9*     BNP:  Recent Labs   Lab 09/01/22 0930 09/02/22  0416   BNP 33 43     CMP:  Recent Labs   Lab 09/01/22 0930 09/01/22 1039 09/02/22 0416 09/03/22  0507   * 235* 190* 280*   CALCIUM 9.9 9.7 10.1 9.5   ALBUMIN 3.8 3.8 3.6  --    PROT 8.7* 8.9* 8.2  --    * 134* 138 136   K 3.7 4.5 4.1 4.2   CO2 25 22* 25 26   CL 99 100 103 103   BUN 17 17 17 13   CREATININE 1.7* 1.6* 1.3 1.3   ALKPHOS 126 131 117  --    ALT 35 40 32  --    AST 38 57* 36  --    BILITOT 0.4 0.4 0.5  --       Coagulation:   Recent Labs   Lab 09/01/22 1039 09/02/22 0416 09/03/22  0507   INR 2.3* 1.9* 1.6*   APTT  --  33.3* 29.5     LDH:  Recent Labs   Lab 09/01/22 0930 09/02/22 0416 09/03/22  0507    233 248     Microbiology:  Microbiology Results (last 7 days)       ** No results found for the last 168 hours. **            I have reviewed all pertinent labs within the past 24 hours.    Estimated Creatinine Clearance: 93 mL/min (based on SCr of 1.3 mg/dL).    Diagnostic Results:  I have reviewed all pertinent imaging results/findings within the past 24 hours.  Assessment and Plan:     Mr. 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 He also completed a course of IV Abx for staph epi. He was weaned off  but he had to restarted due to  "RVF. He was eventually transitioned to milrinone (secondary to  shortage) now on 0.25 mcg/kg/min. He underwent echo this AM and was seen in [facility] clinic today. He did undergo his echo this am however he just informed us that while on his way tot  clinic he had a seizure episode and hit his head. CT scan in the ED negative for intracranial hemorrhage. He reports being lightheaded before losing consciousness and being confused upon waking. He also reports a similar episode earlier this week when he was eating out at restaurant and he was reported by his wfe to "doze off" and not remember and he also was confused upon waking from that episode as well. Denies biting his tongue. VAD speed is at 5100 rpm. No VAD alarms noted on interrogation occasional PI events . BP is 72 mm of Hg. DLES is a "1". INR is therapeutic at 2.05. LDh is at baseline.       * Awareness alteration, transient  Syncopal event vs seizure prior to clinic appt, patient noted to have a history of temporal lobe seizures on Keppra.  CTH negative for acute intracranial process.    - Neurology consulted and 24 hour EEG in process, now inducing seizures with medication trials  - DC tramadol, muscle relaxants, all seizure threshold lowering medications  - Device interrogation with [employer] done and per verbal report: only one episode of NSVT on 8/10  - Interrogation of VAD function within normal limits without alarms or low flows.     Temporal lobe seizure  - On Keppra 1g BID  - Neurology consulted.  See above     LVAD (left ventricular assist device) present  S/p DT HM3 with MVR plus temporary support with Protek Duo for RV support 6/29 (Grecia)  On Milrinone at home 0.25  GASTON trial     - Continue coumadin.  INR subtherapeutic today at 1.6 (goal 2.0-3.0). Previous home dose was 4.5 on Mon, Thurs, and 3mg Qdaily. Increase to 5mg tonight.  - LDH stable  - Continue PO Lasix  - RHC 7/21/22  with speed 5100 RA: 14, RV: 41/8 (14), PA: 42/17 (25), " PWP: 16/17 (15), CO: 5.32, CI: 2.51.  Was scheduled to have repeat RHC as an outpatient today but was postponed as patient with EEG attached.  Will plan to get repeat RHC next week   - ECHO 9/1 with speed at 5100- EF: 10%, LVEDD: 5.97mc     Procedure: Device Interrogation Including analysis of device parameters  Current Settings: Ventricular Assist Device  Review of device function is stable    TXP LVAD INTERROGATIONS 9/3/2022 9/3/2022 9/3/2022 9/2/2022 9/2/2022 9/2/2022 9/2/2022   Type HeartMate3 HeartMate3 HeartMate3 HeartMate3 HeartMate3 HeartMate3 HeartMate3   Flow 3.5 3.4 3.4 3.4 3.9 5100 5100   Speed 5100 5100 5100 5100 5100 3.3 3.8   PI 9.5 7.9 8 6.4 6.0 8.4 5.5   Power (Serna) 3.9 3.8 3.9 3.8 3.6 3.8 3.7   LSL 4700 4700 4700 4700 - - -   Pulsatility Pulse - - - Intermittent pulse Intermittent pulse Intermittent pulse       Type 2 diabetes mellitus with hyperglycemia  - Endocrine consulted and modifying scheduled insulin as needed   - Moderate Dose SQ Insulin Correction Scale.  - BG Monitoring AC/HS     Chronic combined systolic and diastolic congestive heart failure  NICM, suspecting component of familial dilated cardiomyopathy  Last 2D Echo 9/1/22: LVEF 10%, LVEDD 5.97 cm, AV not opening with VAD  On admission CVP: 7, SVO2: 56, CO: 4.28, CI: 2.12, SVR: 1476    - Current diuretic regimen: home dose of Lasix.  Can't take Bumex or Torsemide due to hives  - GDMT with home dose of Aldactone   - Patient was not evaluated for OHTx due to recently quit smoking in April 2022 at the time of workup  - Blood type A +  - 2g Na dietary restriction, 1500 mL fluid restriction, strict I/Os  - Cont milrinone 0.25    Anemia of chronic disease  -H/H stable       Kaila Reilly MD  Heart Transplant  [facility] - Cardiology Stepdown

## 2022-09-03 NOTE — PLAN OF CARE
POC reviewed with patient and spouse at bedside. LVAD numbers WNL. Dressing to be changed by spouse with kit today. Supplies at bedside/ IV milirinone infusing no change in rate. SVO 2 33/40 completed per request of MD. No c/o pain or SOB. Patient was provided  the William monitor for his glucose All questions and concerns addressed. Call light in reach.    Problem: Adult Inpatient Plan of Care  Goal: Plan of Care Review  Outcome: Ongoing, Progressing  Goal: Patient-Specific Goal (Individualized)  Outcome: Ongoing, Progressing  Goal: Absence of Hospital-Acquired Illness or Injury  Outcome: Ongoing, Progressing  Goal: Optimal Comfort and Wellbeing  Outcome: Ongoing, Progressing  Goal: Readiness for Transition of Care  Outcome: Ongoing, Progressing     Problem: Diabetes Comorbidity  Goal: Blood Glucose Level Within Targeted Range  Outcome: Ongoing, Progressing     Problem: Infection  Goal: Absence of Infection Signs and Symptoms  Outcome: Ongoing, Progressing     Problem: Fall Injury Risk  Goal: Absence of Fall and Fall-Related Injury  Outcome: Ongoing, Progressing

## 2022-09-03 NOTE — ASSESSMENT & PLAN NOTE
S/p DT HM3 with MVR plus temporary support with Protek Duo for RV support 6/29 (Grecia)  On Milrinone at home 0.25  GASTON trial     - Continue coumadin.  INR subtherapeutic today at 1.6 (goal 2.0-3.0). Previous home dose was 4.5 on Mon, Thurs, and 3mg Qdaily. Increase to 5mg tonight.  - LDH stable  - Continue PO Lasix  - RHC 7/21/22  with speed 5100 RA: 14, RV: 41/8 (14), PA: 42/17 (25), PWP: 16/17 (15), CO: 5.32, CI: 2.51.  Was scheduled to have repeat RHC as an outpatient today but was postponed as patient with EEG attached.  Will plan to get repeat RHC next week   - ECHO 9/1 with speed at 5100- EF: 10%, LVEDD: 5.97mc     Procedure: Device Interrogation Including analysis of device parameters  Current Settings: Ventricular Assist Device  Review of device function is stable    TXP LVAD INTERROGATIONS 9/3/2022 9/3/2022 9/3/2022 9/2/2022 9/2/2022 9/2/2022 9/2/2022   Type HeartMate3 HeartMate3 HeartMate3 HeartMate3 HeartMate3 HeartMate3 HeartMate3   Flow 3.5 3.4 3.4 3.4 3.9 5100 5100   Speed 5100 5100 5100 5100 5100 3.3 3.8   PI 9.5 7.9 8 6.4 6.0 8.4 5.5   Power (Serna) 3.9 3.8 3.9 3.8 3.6 3.8 3.7   LSL 4700 4700 4700 4700 - - -   Pulsatility Pulse - - - Intermittent pulse Intermittent pulse Intermittent pulse

## 2022-09-03 NOTE — SUBJECTIVE & OBJECTIVE
Interval History: NAEON. Patient denied seizure like activity or headaches. EEG cap replaced and now functional. Neurology inducing seizures today. Pain over VAD driveline site is better controlled.    CVP this morning was 7.  SVO2 33, repeat 40.  CO 3.39 CI 1.7  SVR 1793    Patient appeared well, all extremities were warm.  PICC has been in for 59 days but dressing CDI, no signs of infection.    Continuous Infusions:   milrinone 20mg/100ml D5W (200mcg/ml) 0.25 mcg/kg/min (09/02/22 2227)     Scheduled Meds:   busPIRone  7.5 mg Oral Daily    famotidine  40 mg Oral Daily    ferrous gluconate  324 mg Oral Daily with breakfast    furosemide  80 mg Oral Daily    gabapentin  100 mg Oral TID    insulin aspart U-100  12 Units Subcutaneous TIDWM    insulin detemir U-100  24 Units Subcutaneous QHS    INV ASPIRIN 100MG/PLACEBO  100 mg Oral Daily    levETIRAcetam  1,000 mg Oral BID    LIDOcaine  2 patch Transdermal Q24H    magnesium oxide  800 mg Oral BID    mirtazapine  15 mg Oral Nightly    omega-3 acid ethyl esters  2 g Oral BID    potassium chloride SA  40 mEq Oral TID    senna-docusate 8.6-50 mg  2 tablet Oral Daily    spironolactone  25 mg Oral Daily    warfarin  3 mg Oral Daily     PRN Meds:acetaminophen, albuterol, dextrose 10%, dextrose 10%, glucagon (human recombinant), glucose, glucose, insulin aspart U-100, lorazepam    Review of patient's allergies indicates:   Allergen Reactions    Aspirin Other (See Comments)     Mr. Thacker is enrolled in Dr. Paige's Alon Trial and cannot have any aspirin and/or aspirin-containing products. DO NOT cancel any orders for INV Aspirin 100 mg/Placebo. If you have questions, please contact Isabel @ [phone], [phone],[email or web address], secure chat or MS Teams message.    Bumex [bumetanide] Hives    Lactose Diarrhea     Other reaction(s): Abdominal distension, gaseous    Torsemide Hives     Objective:     Vital Signs (Most Recent):  Temp: 97.8 °F (36.6 °C) (09/03/22  0752)  Pulse: 100 (09/03/22 0752)  Resp: 18 (09/03/22 0752)  BP: 101/68 (09/03/22 0752)  SpO2: 99 % (09/03/22 0752) Vital Signs (24h Range):  Temp:  [97.7 °F (36.5 °C)-98.4 °F (36.9 °C)] 97.8 °F (36.6 °C)  Pulse:  [] 100  Resp:  [16-20] 18  SpO2:  [98 %-100 %] 99 %  BP: ()/(0-81) 101/68     Patient Vitals for the past 72 hrs (Last 3 readings):   Weight   09/03/22 0752 87.8 kg (193 lb 9 oz)   09/02/22 1400 85.3 kg (188 lb 0.8 oz)   09/02/22 0900 85.3 kg (188 lb 0.8 oz)     Body mass index is 24.19 kg/m².      Intake/Output Summary (Last 24 hours) at 9/3/2022 0855  Last data filed at 9/3/2022 0500  Gross per 24 hour   Intake 960 ml   Output 2125 ml   Net -1165 ml       Hemodynamic Parameters:  CVP:  [7 mmHg] 7 mmHg    Telemetry: Reviewed    Physical Exam  Constitutional:       Appearance: Normal appearance.   HENT:      Head: Normocephalic and atraumatic.   Eyes:      Extraocular Movements: Extraocular movements intact.      Pupils: Pupils are equal, round, and reactive to light.   Neck:      Comments: No JVD elevation  Cardiovascular:      Rate and Rhythm: Normal rate and regular rhythm.      Pulses: Normal pulses.      Heart sounds: Normal heart sounds. No murmur heard.     Comments: Smooth VAD hum    Pulmonary:      Effort: Pulmonary effort is normal.      Breath sounds: Normal breath sounds.   Abdominal:      General: Abdomen is flat. There is no distension.      Comments: DLES intact    Musculoskeletal:         General: Normal range of motion.      Cervical back: Neck supple.   Skin:     General: Skin is warm.   Neurological:      General: No focal deficit present.      Mental Status: He is alert and oriented to person, place, and time.       Significant Labs:  CBC:  Recent Labs   Lab 09/01/22  1039 09/02/22  0416 09/03/22  0507   WBC 10.19 12.72* 9.30   RBC 4.91 4.60 4.49*   HGB 12.4* 12.3* 11.4*   HCT 38.6* 36.2* 35.7*    279 306   MCV 79* 79* 80*   MCH 25.3* 26.7* 25.4*   MCHC 32.1 34.0  31.9*     BNP:  Recent Labs   Lab 09/01/22  0930 09/02/22  0416   BNP 33 43     CMP:  Recent Labs   Lab 09/01/22  0930 09/01/22  1039 09/02/22  0416 09/03/22  0507   * 235* 190* 280*   CALCIUM 9.9 9.7 10.1 9.5   ALBUMIN 3.8 3.8 3.6  --    PROT 8.7* 8.9* 8.2  --    * 134* 138 136   K 3.7 4.5 4.1 4.2   CO2 25 22* 25 26   CL 99 100 103 103   BUN 17 17 17 13   CREATININE 1.7* 1.6* 1.3 1.3   ALKPHOS 126 131 117  --    ALT 35 40 32  --    AST 38 57* 36  --    BILITOT 0.4 0.4 0.5  --       Coagulation:   Recent Labs   Lab 09/01/22  1039 09/02/22  0416 09/03/22  0507   INR 2.3* 1.9* 1.6*   APTT  --  33.3* 29.5     LDH:  Recent Labs   Lab 09/01/22 0930 09/02/22  0416 09/03/22  0507    233 248     Microbiology:  Microbiology Results (last 7 days)       ** No results found for the last 168 hours. **            I have reviewed all pertinent labs within the past 24 hours.    Estimated Creatinine Clearance: 93 mL/min (based on SCr of 1.3 mg/dL).    Diagnostic Results:  I have reviewed all pertinent imaging results/findings within the past 24 hours.

## 2022-09-03 NOTE — PROGRESS NOTES
"[facility] - Cardiology Stepdown  Endocrinology  Progress Note    Admit Date: 9/1/2022     Reason for Consult: Management of T2DM, Hyperglycemia     Surgical Procedure and Date: LVAD 06/29/2022    Diabetes diagnosis year: 2022    Home Diabetes Medications:    Levemir 22 units HS.   Novolog 10 units TIDWM.     How often checking glucose at home? 1-3 x day   BG readings on regimen: 100's - 200s  Hypoglycemia on the regimen?  No  Missed doses on regimen?  No    Diabetes Complications include:     Hyperglycemia     Complicating diabetes co morbidities:   History of MI, CHF, and CKD      HPI:   Patient is a 37 y.o. 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 He also completed a course of IV Abx for staph epi. He was weaned off  but he had to restarted due to RVF. He was eventually transitioned to milrinone (secondary to  shortage) now on 0.25 mcg/kg/min. He underwent echo this AM and was seen in \[facility]\"" clinic today. He did undergo his echo this am however he just informed us that while on his way tot  clinic he had a seizure episode and hit his head. CT scan in the ED negative for intracranial hemorrhage. He reports being lightheaded before losing consciousness and being confused upon waking. He also reports a similar episode earlier this week when he was eating out at restaurant and he was reported by his wfe to "doze off" and not remember and he also was confused upon waking from that episode as well. Denies biting his tongue. VAD speed is at 5100 rpm. No VAD alarms noted on interrogation occasional PI events . BP is 72 mm of Hg. DLES is a "1". INR " "is therapeutic at 2.05. LDh is at baseline. Endocrinology consulted on 2022 to manage glycemic control.     Lab Results   Component Value Date    HGBA1C 9.2 (H) 2022           Interval HPI:   Overnight events:  BG stable while on current SQ mdi insulin regimen. Fasting BG elevated this AM. FreeStyle William sample provided to patient. Will work on PA for discharge rx. Endocrinology will continue to follow, and manage glycemic control while inpatient.   Diet [facility]; Fluid - 1500mL       Eatin%  Nausea: No  Hypoglycemia and intervention: No  Fever: No  TPN and/or TF: No  If yes, type of TF/TPN and rate: None    BP (!) 78/0 (BP Location: Left arm, Patient Position: Sitting)   Pulse 100   Temp 98.1 °F (36.7 °C) (Oral)   Resp 20   Ht 6' 3" (1.905 m)   Wt 87.8 kg (193 lb 9 oz)   SpO2 99%   BMI 24.19 kg/m²     Labs Reviewed and Include    Recent Labs   Lab 22  0507   *   CALCIUM 9.5      K 4.2   CO2 26      BUN 13   CREATININE 1.3     Lab Results   Component Value Date    WBC 9.30 2022    HGB 11.4 (L) 2022    HCT 35.7 (L) 2022    MCV 80 (L) 2022     2022     No results for input(s): TSH, FREET4 in the last 168 hours.  Lab Results   Component Value Date    HGBA1C 7.4 (H) 2022       Nutritional status:   Body mass index is 24.19 kg/m².  Lab Results   Component Value Date    ALBUMIN 3.6 2022    ALBUMIN 3.8 2022    ALBUMIN 3.8 2022     Lab Results   Component Value Date    PREALBUMIN 32 2022    PREALBUMIN 33 2022    PREALBUMIN 28 2022       Estimated Creatinine Clearance: 93 mL/min (based on SCr of 1.3 mg/dL).    Accu-Checks  Recent Labs     22  1929 22  0020 22  0749 22  1118 22  1618 22  2103 22  0755 22  1206   POCTGLUCOSE 286* 167* 264* 129* 169* 198* 185* 150*       Current Medications and/or Treatments Impacting Glycemic " Control  Immunotherapy:    Immunosuppressants       None          Steroids:   Hormones (From admission, onward)      None          Pressors:    Autonomic Drugs (From admission, onward)      None          Hyperglycemia/Diabetes Medications:   Antihyperglycemics (From admission, onward)      Start     Stop Route Frequency Ordered    09/02/22 2100  insulin detemir U-100 pen 24 Units         -- SubQ Nightly 09/02/22 1100    09/02/22 1130  insulin aspart U-100 pen 12 Units         -- SubQ 3 times daily with meals 09/02/22 1100    09/01/22 1844  insulin aspart U-100 pen 1-10 Units         -- SubQ Before meals & nightly PRN 09/01/22 6965            ASSESSMENT and PLAN    * Awareness alteration, transient  Managed per primary team  Avoid hypoglycemia        Type 2 diabetes mellitus with hyperglycemia  BG goal 140 -180     - Increase Levemir to 28 units HS. FBG above goal this AM. FBG above goal.   - Decrease Novolog to 10 units TIDWM. CGM sample provided to patient. Dietary teaching reinforced.   - Moderate Dose SQ Insulin Correction Scale.  - BG Monitoring AC/HS     ** Please call Endocrine for any BG related issues **  ** Please notify Endocrine for any change and/or advance in diet**  Lab Results   Component Value Date    HGBA1C 7.4 (H) 09/01/2022       Discharge Planning:   TBD. Please notify endocrinology prior to discharge.       Anemia of chronic disease  May affect accuracy of HbA1c results.               Chris Gomes, NP  Endocrinology  [facility] - Cardiology Stepdown

## 2022-09-03 NOTE — PROCEDURES
///24 hr. Video EEG Monitoring    Date/Time: 9/1/2022 10:18 AM  Performed by: Hunter Herman MD  Authorized by: Jeff Spencer PA-C     EXTENDED  ELECTROENCEPHALOGRAM  REPORT    DATE OF SERVICE: 9/2/22-9/3/22  EEG NUMBER: [id number]  REQUESTED BY:  [place]  LOCATION OF SERVICE:  [facility] [place]    METHODOLOGY   Electroencephalographic (EEG) recording is with electrodes placed according to the International 10-20 placement system.  Thirty two (32) channels of digital signal (sampling rate of 512/sec) including T1 and T2 was simultaneously recorded from the scalp and may include  EKG, EMG, and/or eye monitors.  Recording band pass was 0.1 to 512 hz.  Digital video recording of the patient is simultaneously recorded with the EEG.  The patient is instructed report clinical symptoms which may occur during the recording session.  EEG and video recording is stored and archived in digital format.  Activation procedures which include photic stimulation, hyperventilation and instructing patients to perform simple task are done in selected patients.   The EEG is displayed on a monitor screen and can be reviewed using different montages.  Computer assisted analysis is employed to detect spike and electrographic seizure activity.   The entire record is submitted for computer analysis.  The entire recording is visually reviewed and the times identified by computer analysis as being spikes or seizures are reviewed again.  Compresses spectral analysis (CSA) is also performed on the activity recorded from each individual channel.  This is displayed as a power display of frequencies from 0 to 30 Hz over time.   The CSA is reviewed looking for asymmetries in power between homologous areas of the scalp and then compared with the original EEG recording.     [employer] software was also utilized in the review of this study.  This software suite analyzes the EEG recording in multiple domains.  Coherence and rhythmicity is computed to identify EEG  sections which may contain organized seizures.  Each channel undergoes analysis to detect presence of spike and sharp waves which have special and morphological characteristic of epileptic activity.  The routine EEG recording is converted from spacial into frequency domain.  This is then displayed comparing homologous areas to identify areas of significant asymmetry.  Algorithm to identify non-cortically generated artifact is used to separate eye movement, EMG and other artifact from the EEG.      RECORDING TIMES  Start on 9/2/22 at 14:12:53  Stop on 9/3/22 at 07:00:06    A total of  14  hrs of EEG recording was obtained.    EEG FINDINGS  The record shows a fair  organization at rest, consisting of a 8-9 Hz posterior dominant rhythm with good  reactivity. There is mild bilateral beta activity. There is continuous diffuse theta and delta range background slwoing.     Drowsiness is characterized by attenuation of the background, vertex waves, and bilateral theta slowing. Sleep architecture is not seen.    Provocative maneuvers including hyperventilation and photic stimulation were performed.     EKG recording shows a regular  rhythm.    There is no push button or clinical event.    IMPRESSION:  Abnormal study due to mild to moderate  diffuse background slowing consistent with diffuse cerebral dysfunction and encephalopathy which may be on the basis of toxic, metabolic, or primary neuronal disorder.     Hunter Herman MD

## 2022-09-03 NOTE — SUBJECTIVE & OBJECTIVE
Subjective:     Interval History: No new episodes overnight; sleep deprivation performed and tramadol/benadryl given    Current Neurological Medications: gabapentin 100 mg TID, keppra 1000 mg BID    Current Facility-Administered Medications   Medication Dose Route Frequency Provider Last Rate Last Admin    acetaminophen tablet 650 mg  650 mg Oral Q6H PRN DEYA Martinez        albuterol inhaler 2 puff  2 puff Inhalation Q4H PRN Jeff Spencer PA-C        busPIRone split tablet 7.5 mg  7.5 mg Oral Daily Jeff Spencer PA-C   7.5 mg at 09/03/22 0629    dextrose 10% bolus 125 mL  12.5 g Intravenous PRN Chris Marquezac, NP        dextrose 10% bolus 250 mL  25 g Intravenous PRN Chris Gomes NP        [START ON 9/4/2022] diphenhydrAMINE capsule 50 mg  50 mg Oral Once G. Felipe Martinez MD        famotidine tablet 40 mg  40 mg Oral Daily Jeff Spencer PA-C   40 mg at 09/03/22 0901    ferrous gluconate tablet 324 mg  324 mg Oral Daily with breakfast Jeff Spencer PA-C   324 mg at 09/03/22 0901    furosemide tablet 80 mg  80 mg Oral Daily Jeff Spencer PA-C   80 mg at 09/03/22 0901    gabapentin capsule 100 mg  100 mg Oral TID Jeff Spencer PA-C   100 mg at 09/03/22 0901    glucagon (human recombinant) injection 1 mg  1 mg Intramuscular PRN Luke Cenac, NP        glucose chewable tablet 16 g  16 g Oral PRN Chris Marquezac, NP        glucose chewable tablet 24 g  24 g Oral PRN Chris Marquezac, NP        insulin aspart U-100 pen 1-10 Units  1-10 Units Subcutaneous QID (AC + HS) PRN Luke Cenac, NP   2 Units at 09/03/22 0756    insulin aspart U-100 pen 12 Units  12 Units Subcutaneous TIDWM Luke Cenac, NP   12 Units at 09/03/22 1206    insulin detemir U-100 pen 24 Units  24 Units Subcutaneous QHS Luke Cenac, NP   24 Units at 09/02/22 2112    INV ASPIRIN 100MG/PLACEBO 100 mg  100 mg Oral Daily Jeff Spencer PA-C   100 mg at 09/03/22 0900    LIDOcaine 5 % patch 2 patch  2 patch Transdermal Q24H Kaila Reilly MD        lorazepam injection 2 mg  2  mg Intravenous Q5 Min PRN Laura Kern PA-C        magnesium oxide tablet 800 mg  800 mg Oral BID Jeff Spencer PA-C   800 mg at 09/03/22 0901    milrinone 20mg in D5W 100 mL infusion  0.25 mcg/kg/min (Order-Specific) Intravenous Continuous Jeff Spencer PA-C 7 mL/hr at 09/02/22 2227 0.25 mcg/kg/min at 09/02/22 2227    mirtazapine tablet 15 mg  15 mg Oral Nightly Jeff Spencer PA-C   15 mg at 09/02/22 2108    omega-3 acid ethyl esters capsule 2 g  2 g Oral BID Jeff Spencer PA-C   2 g at 09/03/22 0901    potassium chloride SA CR tablet 40 mEq  40 mEq Oral TID Jeff Spencer PA-C   40 mEq at 09/03/22 0900    senna-docusate 8.6-50 mg per tablet 2 tablet  2 tablet Oral Daily Jeff Spencer PA-C        spironolactone tablet 25 mg  25 mg Oral Daily Jeff Spencer PA-C   25 mg at 09/03/22 0901    [START ON 9/4/2022] traMADoL tablet 50 mg  50 mg Oral Once G. Felipe Martinez MD        warfarin (COUMADIN) tablet 5 mg  5 mg Oral Daily Chantal Herndon MD           Review of Systems   Constitutional:  Negative for chills and fever.   HENT:  Negative for sore throat and trouble swallowing.    Eyes:  Negative for photophobia and visual disturbance.   Respiratory:  Negative for cough and shortness of breath.    Cardiovascular:  Negative for chest pain and leg swelling.   Gastrointestinal:  Negative for nausea and vomiting.   Musculoskeletal:  Negative for gait problem and neck stiffness.   Skin:  Negative for color change and pallor.   Neurological:  Positive for seizures, syncope and numbness. Negative for facial asymmetry, speech difficulty and weakness.   Psychiatric/Behavioral:  Positive for sleep disturbance. Negative for hallucinations.    Objective:     Vital Signs (Most Recent):  Temp: 97.8 °F (36.6 °C) (09/03/22 0752)  Pulse: 100 (09/03/22 0752)  Resp: 18 (09/03/22 0752)  BP: 101/68 (09/03/22 0752)  SpO2: 99 % (09/03/22 0752)   Vital Signs (24h Range):  Temp:  [97.8 °F (36.6 °C)-98.4 °F (36.9 °C)] 97.8 °F (36.6  °C)  Pulse:  [] 100  Resp:  [16-20] 18  SpO2:  [98 %-99 %] 99 %  BP: ()/(0-81) 101/68     Weight: 87.8 kg (193 lb 9 oz)  Body mass index is 24.19 kg/m².    Physical Exam  Vitals reviewed.   Constitutional:       General: He is not in acute distress.  HENT:      Head: Normocephalic.   Eyes:      Extraocular Movements: Extraocular movements intact.   Cardiovascular:      Rate and Rhythm: Tachycardia present.   Pulmonary:      Effort: Pulmonary effort is normal. No respiratory distress.   Neurological:      Mental Status: He is alert and oriented to person, place, and time.      Cranial Nerves: Cranial nerves 2-12 are intact.      Coordination: Finger-Nose-Finger Test normal.      Deep Tendon Reflexes:      Reflex Scores:       Tricep reflexes are 2+ on the right side and 2+ on the left side.       Bicep reflexes are 2+ on the right side and 2+ on the left side.       Brachioradialis reflexes are 2+ on the right side and 2+ on the left side.       Patellar reflexes are 2+ on the right side and 2+ on the left side.       Achilles reflexes are 2+ on the right side and 1+ on the left side.  Psychiatric:         Speech: Speech normal.       NEUROLOGICAL EXAMINATION:     MENTAL STATUS   Oriented to person, place, and time.   Attention: normal. Concentration: normal.   Speech: speech is normal   Level of consciousness: alert    CRANIAL NERVES   Cranial nerves II through XII intact.     MOTOR EXAM   Muscle bulk: normal  Overall muscle tone: normal    Strength   Right neck flexion: 5/5  Left neck flexion: 5/5  Right neck extension: 5/5  Left neck extension: 5/5  Right deltoid: 5/5  Left deltoid: 5/5  Right biceps: 5/5  Left biceps: 5/5  Right triceps: 5/5  Left triceps: 5/5  Right wrist flexion: 5/5  Left wrist flexion: 5/5  Right wrist extension: 5/5  Left wrist extension: 5/5  Right interossei: 5/5  Left interossei: 5/5  Right abdominals: 5/5  Left abdominals: 5/5  Right iliopsoas: 5/5  Left iliopsoas: 5/5  Right  quadriceps: 5/5  Left quadriceps: 5/5  Right hamstrin/5  Left hamstrin/5  Right glutei: 5/5  Left glutei: 5/5  Right anterior tibial: 5/5  Left anterior tibial: 5/5  Right posterior tibial: 5/5  Left posterior tibial: 5/5  Right peroneal: 5/5  Left peroneal: 5/5  Right gastroc: 5/5  Left gastroc: 5/5    REFLEXES     Reflexes   Right brachioradialis: 2+  Left brachioradialis: 2+  Right biceps: 2+  Left biceps: 2+  Right triceps: 2+  Left triceps: 2+  Right patellar: 2+  Left patellar: 2+  Right achilles: 2+  Left achilles: 1+  Right ankle clonus: absent  Left ankle clonus: absent    SENSORY EXAM   Right arm light touch: normal  Left arm light touch: decreased from fingers  Right leg light touch: normal  Left leg light touch: decreased from knee  Sensory deficit distribution on left: median       Paresthesias in L 1-3 digits  Paresthesias in L foot up to ankle  Decreased sensation from L knee to L ankle     GAIT AND COORDINATION      Coordination   Finger to nose coordination: normal    Significant Labs: All pertinent lab results from the past 24 hours have been reviewed.    Significant Imaging: I have reviewed all pertinent imaging results/findings within the past 24 hours.

## 2022-09-03 NOTE — PROGRESS NOTES
"[facility] - Cardiology Stepdown  Neurology  Progress Note    Patient Name: Kevan Queen  MRN: [id number]  Admission Date: 9/1/2022  Hospital Length of Stay: 1 days  Code Status: Full Code   Attending Provider: Dalia Crum MD  Primary Care Physician: ORALIA Cline   Principal Problem:Awareness alteration, transient    HPI:   37 y.o. male with NIDCM, HFrEF s/p LVAD (6/29/22) with recurrent episodes concerning for seizure vs. Syncope presented to ED 9/1/22 after episode in parking garage prior to Cards appt today. Patient reports episodes started after VAD placement. Typically gets warning like head feels funny with electrical sensation. Today episode was unwitnessed because his wife was getting a wheelchair when episode occurred. He fell backwards hitting his head on the ground. LOC for a few seconds with brief confusion afterwards, was back at cognitive baseline during Cards appt. LVAD was interrogated and no associated alarms with today's episode. He was advised to present to ED given coumadin and head trauma. CTH without unremarkable for acute intracranial pathology. Per chart review, he has been evaluated by Neuro during previous admissions, started on Keppra 1 g BID after EEG with left TIRDA and imaging with L occipital encephalomalacia. He followed up with Dr. Devi 8/22/22 for recurrent episodes despite Keppra. Longest duration of episode free days since hospital discharge is 4 days. On review of medications, patient is taking tramadol daily for pain. He was advised to continue Keppra and scheduled for EMU admission for event characterization in October. Neurology now consulted 9/1 for seizure evaluation.       Overview/Hospital Course:  No notes on file        Subjective:     Interval History: patient denies new episodes overnight, reporting intermittent abnormal sensation in bifrontal area described as "electrical sensation," persistent L 1-3 finger paresthesias and L foot paresthesias since LVAD " placement in June    Current Neurological Medications: gabapentin 100 mg TID, keppra 1000 mg BID    Current Facility-Administered Medications   Medication Dose Route Frequency Provider Last Rate Last Admin    albuterol inhaler 2 puff  2 puff Inhalation Q4H PRN Jeff Spencer PA-C        busPIRone split tablet 7.5 mg  7.5 mg Oral Daily Jeff Spencer PA-C   7.5 mg at 09/02/22 0644    dextrose 10% bolus 125 mL  12.5 g Intravenous PRN Luke Cenac, NP        dextrose 10% bolus 250 mL  25 g Intravenous PRN Luke Cenac, NP        famotidine tablet 40 mg  40 mg Oral Daily Jeff Spencer PA-C   40 mg at 09/02/22 0827    ferrous gluconate tablet 324 mg  324 mg Oral Daily with breakfast Jeff Spencer PA-C   324 mg at 09/02/22 0829    furosemide tablet 80 mg  80 mg Oral Daily Jeff Spencer PA-C   80 mg at 09/02/22 0828    gabapentin capsule 100 mg  100 mg Oral TID Jeff Spencer PA-C   100 mg at 09/02/22 0828    glucagon (human recombinant) injection 1 mg  1 mg Intramuscular PRN Luke Cenac, NP        glucose chewable tablet 16 g  16 g Oral PRN Luke Cenac, NP        glucose chewable tablet 24 g  24 g Oral PRN Luke Cenac, NP        insulin aspart U-100 pen 1-10 Units  1-10 Units Subcutaneous QID (AC + HS) PRN Luke Cenac, NP   6 Units at 09/02/22 0837    insulin aspart U-100 pen 12 Units  12 Units Subcutaneous TIDWM Luke Cenac, NP   12 Units at 09/02/22 1224    insulin detemir U-100 pen 24 Units  24 Units Subcutaneous QHS Luke Cenac, NP        INV ASPIRIN 100MG/PLACEBO 100 mg  100 mg Oral Daily Jeff Spencer PA-C   100 mg at 09/02/22 0900    levETIRAcetam tablet 1,000 mg  1,000 mg Oral BID Jeff Spencer PA-C   1,000 mg at 09/02/22 0827    LIDOcaine 5 % patch 2 patch  2 patch Transdermal Q24H Kaila Reilly MD        magnesium oxide tablet 800 mg  800 mg Oral BID Jeff Spencer PA-C   800 mg at 09/02/22 0827    milrinone 20mg in D5W 100 mL infusion  0.25 mcg/kg/min (Order-Specific) Intravenous Continuous Jeff Spencer,  BHASKAR 7 mL/hr at 09/02/22 0739 0.25 mcg/kg/min at 09/02/22 0739    mirtazapine tablet 15 mg  15 mg Oral Nightly Jeff Spencer PA-C   15 mg at 09/01/22 2217    omega-3 acid ethyl esters capsule 2 g  2 g Oral BID Jeff Spencer PA-C   2 g at 09/02/22 0827    potassium chloride SA CR tablet 40 mEq  40 mEq Oral TID Jeff Spencer PA-C   40 mEq at 09/02/22 0829    senna-docusate 8.6-50 mg per tablet 2 tablet  2 tablet Oral Daily Jeff Spencer PA-C        spironolactone tablet 25 mg  25 mg Oral Daily Jeff Spencer PA-C   25 mg at 09/02/22 0827    tiZANidine tablet 2 mg  2 mg Oral Q8H PRN Jeff Spencer PA-C   2 mg at 09/02/22 0016    warfarin (COUMADIN) tablet 3 mg  3 mg Oral Daily Jeff Spencer PA-C           Review of Systems   Constitutional:  Negative for chills and fever.   HENT:  Negative for sore throat and trouble swallowing.    Eyes:  Negative for photophobia and visual disturbance.   Respiratory:  Negative for cough and shortness of breath.    Cardiovascular:  Negative for chest pain and leg swelling.   Gastrointestinal:  Negative for nausea and vomiting.   Musculoskeletal:  Negative for gait problem and neck stiffness.   Skin:  Negative for color change and pallor.   Neurological:  Positive for seizures, syncope and numbness. Negative for facial asymmetry, speech difficulty and weakness.   Psychiatric/Behavioral:  Positive for sleep disturbance. Negative for hallucinations.    Objective:     Vital Signs (Most Recent):  Temp: 97.7 °F (36.5 °C) (09/02/22 1119)  Pulse: 110 (09/02/22 1200)  Resp: 17 (09/02/22 1119)  BP: (!) 82/0 (09/02/22 1215)  SpO2: 98 % (09/02/22 1119)   Vital Signs (24h Range):  Temp:  [97.6 °F (36.4 °C)-98.3 °F (36.8 °C)] 97.7 °F (36.5 °C)  Pulse:  [103-129] 110  Resp:  [16-24] 17  SpO2:  [98 %-100 %] 98 %  BP: ()/(0-80) 82/0     Weight: 85.3 kg (188 lb 0.8 oz)  Body mass index is 23.5 kg/m².    Physical Exam  Vitals reviewed.   Constitutional:       General: He is not in acute  distress.  HENT:      Head: Normocephalic.   Eyes:      Extraocular Movements: Extraocular movements intact.   Cardiovascular:      Rate and Rhythm: Tachycardia present.   Pulmonary:      Effort: Pulmonary effort is normal. No respiratory distress.   Psychiatric:         Speech: Speech normal.       NEUROLOGICAL EXAMINATION:     MENTAL STATUS   Oriented to person, place, and time.   Attention: normal. Concentration: normal.   Speech: speech is normal   Level of consciousness: alert    CRANIAL NERVES   Cranial nerves II through XII intact.     MOTOR EXAM   Muscle bulk: normal  Overall muscle tone: normal    Strength   Right neck flexion: 5/5  Left neck flexion: 5/5  Right neck extension: 5/5  Left neck extension: 5/5  Right deltoid: 5/5  Left deltoid: 5/5  Right biceps: 5/5  Left biceps: 5/5  Right triceps: 5/5  Left triceps: 5/5  Right wrist flexion: 5/5  Left wrist flexion: 5/5  Right wrist extension: 5/5  Left wrist extension: 5/5  Right interossei: 5/5  Left interossei: 5/5  Right abdominals: 5/5  Left abdominals: 5/5  Right iliopsoas: 5/5  Left iliopsoas: 5/5  Right quadriceps: 5/5  Left quadriceps: 5/5  Right hamstrin/5  Left hamstrin/5  Right glutei: 5/5  Left glutei: 5/5  Right anterior tibial: 5/5  Left anterior tibial: 5/5  Right posterior tibial: 5/5  Left posterior tibial: 5/5  Right peroneal: 5/5  Left peroneal: 5/5  Right gastroc: 5/5  Left gastroc: 5/5    REFLEXES     Reflexes   Right brachioradialis: 2+  Left brachioradialis: 2+  Right biceps: 2+  Left biceps: 2+  Right triceps: 2+  Left triceps: 2+  Right patellar: 2+  Left patellar: 2+  Right achilles: 2+  Left achilles: 1+  Right ankle clonus: absent  Left ankle clonus: absent    SENSORY EXAM   Right arm light touch: normal  Left arm light touch: decreased from fingers  Right leg light touch: normal  Left leg light touch: decreased from knee  Sensory deficit distribution on left: median       Paresthesias in L 1-3 digits  Paresthesias in L  "foot up to ankle  Decreased sensation from L knee to L ankle     GAIT AND COORDINATION      Coordination   Finger to nose coordination: normal    Significant Labs: All pertinent lab results from the past 24 hours have been reviewed.    Significant Imaging: I have reviewed all pertinent imaging results/findings within the past 24 hours.    Assessment and Plan:     * Awareness alteration, transient  37 y.o. male with NID s/p LVAD (6/2022) presents after syncope vs. seizure event in parking garage this morning. Pt reports he felt a warning funny sensation in head then lost consciousness, fell backwards hitting his head on the concrete. He proceeded to his scheduled Cards appt and was back at cognitive baseline. LVAD interrogated without associated alarms, settings not adjusted. He was advised to present to ED where CTH without contrast was unremarkable for acute intracranial pathology. He is currently taking Keppra 1 g BID, which was started during LVAD admission for recurrent episodes of loss of awareness with L TIRDA on EEG and encephalomalacia on imaging. Recently seen by Dr. Devi who considered EMU admission in October for event characterization; however, patient unable to be admitted to the EMU service given continuous milrinone infusion.     9/2 further history of episodes obtained. He reports feeling an "electrical sensation" in bifrontal area, which sometimes precedes an event but not always associated with an event. This sensation is worse when trying to concentrate and with eye movement. Prior to an event, he will have weakness and his surroundings appear in slow motion. His significant other reports increase in events with lack of sleep and increased stress. He has been having these events every other day. He has gone a maximum of 4 days without an event and noted he was not taking his tramadol at that time. No associated postictal state with every event, reports postictal symptoms after 2 " events.    Recommendations:  --admitted to [facility] service, LTM EEG ordered, will start seizure provoking methods once EEG is connected  --continue Keppra 1 g BID  --continue current dose of gabapentin, once EEG is completed recommend increasing dose to 300 mg TID for neuropathic pain in L hand and foot  --check levetiracetam level  --please discontinue tramadol and other medications that lower seizure threshold, unless otherwise ordered for seizure provoking   --continue correction of electrolyte derangements   --seizure precautions        VTE Risk Mitigation (From admission, onward)         Ordered     warfarin (COUMADIN) tablet 3 mg  Daily         09/01/22 2071                Nelson Martinez MD  Neurology  [facility] - Cardiology Stepdown

## 2022-09-03 NOTE — ASSESSMENT & PLAN NOTE
- Endocrine consulted and modifying scheduled insulin as needed   - Moderate Dose SQ Insulin Correction Scale.  - BG Monitoring AC/HS

## 2022-09-03 NOTE — ASSESSMENT & PLAN NOTE
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  CTH negative for acute intracranial process.    - Neurology consulted and 24 hour EEG in process, now inducing seizures with medication trials  - DC tramadol, muscle relaxants, all seizure threshold lowering medications  - Device interrogation with [employer] done and per verbal report: only one episode of NSVT on 8/10  - Interrogation of VAD function within normal limits without alarms or low flows.

## 2022-09-03 NOTE — PROGRESS NOTES
"  Writer was informed this evening that patient has been experiencing pain around his driveline site. Patient states that this started around the time he had fell in his garage. Pain is mostly described as a "burning sensation." Pain level remains unchanged since admission 2 days ago. Dressing around the site was changed this evening, there was no fluctuation, drainage (or bleeding), erythema or bruising noted. Site was non-tender on exam, soft. Patient appears comfortable, no discomfort/distress. Will continue to monitor closely for now and re-evaluate if pain worsens and will consider pursuing further imaging if needed.     Radha Vann MD  Heart Transplant    "

## 2022-09-03 NOTE — PROGRESS NOTES
[facility] - Cardiology Stepdown  Neurology  Progress Note    Patient Name: Kevan Queen  MRN: [id number]  Admission Date: 9/1/2022  Hospital Length of Stay: 2 days  Code Status: Full Code   Attending Provider: Dalia Crum MD  Primary Care Physician: ORALIA Cline   Principal Problem:Awareness alteration, transient    HPI:   37 y.o. male with NIDCM, HFrEF s/p LVAD (6/29/22) with recurrent episodes concerning for seizure vs. Syncope presented to ED 9/1/22 after episode in parking garage prior to Cards appt today. Patient reports episodes started after VAD placement. Typically gets warning like head feels funny with electrical sensation. Today episode was unwitnessed because his wife was getting a wheelchair when episode occurred. He fell backwards hitting his head on the ground. LOC for a few seconds with brief confusion afterwards, was back at cognitive baseline during Cards appt. LVAD was interrogated and no associated alarms with today's episode. He was advised to present to ED given coumadin and head trauma. CTH without unremarkable for acute intracranial pathology. Per chart review, he has been evaluated by Neuro during previous admissions, started on Keppra 1 g BID after EEG with left TIRDA and imaging with L occipital encephalomalacia. He followed up with Dr. Devi 8/22/22 for recurrent episodes despite Keppra. Longest duration of episode free days since hospital discharge is 4 days. On review of medications, patient is taking tramadol daily for pain. He was advised to continue Keppra and scheduled for EMU admission for event characterization in October. Neurology now consulted 9/1 for seizure evaluation.       Overview/Hospital Course:  No notes on file        Subjective:     Interval History: No new episodes overnight; sleep deprivation performed and tramadol/benadryl given    Current Neurological Medications: gabapentin 100 mg TID, keppra 1000 mg BID    Current Facility-Administered Medications    Medication Dose Route Frequency Provider Last Rate Last Admin    acetaminophen tablet 650 mg  650 mg Oral Q6H PRN DEYA Martinez        albuterol inhaler 2 puff  2 puff Inhalation Q4H PRN Jeff Spencer PA-C        busPIRone split tablet 7.5 mg  7.5 mg Oral Daily Jeff Spencer PA-C   7.5 mg at 09/03/22 0629    dextrose 10% bolus 125 mL  12.5 g Intravenous PRN Luke Cenac, NP        dextrose 10% bolus 250 mL  25 g Intravenous PRN Chris Cenmattie, NP        [START ON 9/4/2022] diphenhydrAMINE capsule 50 mg  50 mg Oral Once G. Felipe Martinez MD        famotidine tablet 40 mg  40 mg Oral Daily Jeff Spencer PA-C   40 mg at 09/03/22 0901    ferrous gluconate tablet 324 mg  324 mg Oral Daily with breakfast Jeff Spencer PA-C   324 mg at 09/03/22 0901    furosemide tablet 80 mg  80 mg Oral Daily Jeff Spencer PA-C   80 mg at 09/03/22 0901    gabapentin capsule 100 mg  100 mg Oral TID Jeff Spencer PA-C   100 mg at 09/03/22 0901    glucagon (human recombinant) injection 1 mg  1 mg Intramuscular PRN Luke Cenac, NP        glucose chewable tablet 16 g  16 g Oral PRN Luke Cenac, NP        glucose chewable tablet 24 g  24 g Oral PRN Luke Cenac, NP        insulin aspart U-100 pen 1-10 Units  1-10 Units Subcutaneous QID (AC + HS) PRN Luke Cenac, NP   2 Units at 09/03/22 0756    insulin aspart U-100 pen 12 Units  12 Units Subcutaneous TIDWM Luke Cenac, NP   12 Units at 09/03/22 1206    insulin detemir U-100 pen 24 Units  24 Units Subcutaneous QHS Luke Cenac, NP   24 Units at 09/02/22 2112    INV ASPIRIN 100MG/PLACEBO 100 mg  100 mg Oral Daily Jeff Spencer PA-C   100 mg at 09/03/22 0900    LIDOcaine 5 % patch 2 patch  2 patch Transdermal Q24H Kaila Reilly MD        lorazepam injection 2 mg  2 mg Intravenous Q5 Min PRN Laura Kern PA-C        magnesium oxide tablet 800 mg  800 mg Oral BID Jeff Spencer PA-C   800 mg at 09/03/22 0901    milrinone 20mg in D5W 100 mL infusion  0.25 mcg/kg/min (Order-Specific)  Intravenous Continuous Jeff Spencer PA-C 7 mL/hr at 09/02/22 2227 0.25 mcg/kg/min at 09/02/22 2227    mirtazapine tablet 15 mg  15 mg Oral Nightly Jeff Spencer PA-C   15 mg at 09/02/22 2108    omega-3 acid ethyl esters capsule 2 g  2 g Oral BID Jeff Spencer PA-C   2 g at 09/03/22 0901    potassium chloride SA CR tablet 40 mEq  40 mEq Oral TID Jeff Spencer PA-C   40 mEq at 09/03/22 0900    senna-docusate 8.6-50 mg per tablet 2 tablet  2 tablet Oral Daily Jeff Spencer PA-C        spironolactone tablet 25 mg  25 mg Oral Daily Jeff Spencer PA-C   25 mg at 09/03/22 0901    [START ON 9/4/2022] traMADoL tablet 50 mg  50 mg Oral Once G. Felipe Martinez MD        warfarin (COUMADIN) tablet 5 mg  5 mg Oral Daily Chantal Herndon MD           Review of Systems   Constitutional:  Negative for chills and fever.   HENT:  Negative for sore throat and trouble swallowing.    Eyes:  Negative for photophobia and visual disturbance.   Respiratory:  Negative for cough and shortness of breath.    Cardiovascular:  Negative for chest pain and leg swelling.   Gastrointestinal:  Negative for nausea and vomiting.   Musculoskeletal:  Negative for gait problem and neck stiffness.   Skin:  Negative for color change and pallor.   Neurological:  Positive for seizures, syncope and numbness. Negative for facial asymmetry, speech difficulty and weakness.   Psychiatric/Behavioral:  Positive for sleep disturbance. Negative for hallucinations.    Objective:     Vital Signs (Most Recent):  Temp: 97.8 °F (36.6 °C) (09/03/22 0752)  Pulse: 100 (09/03/22 0752)  Resp: 18 (09/03/22 0752)  BP: 101/68 (09/03/22 0752)  SpO2: 99 % (09/03/22 0752)   Vital Signs (24h Range):  Temp:  [97.8 °F (36.6 °C)-98.4 °F (36.9 °C)] 97.8 °F (36.6 °C)  Pulse:  [] 100  Resp:  [16-20] 18  SpO2:  [98 %-99 %] 99 %  BP: ()/(0-81) 101/68     Weight: 87.8 kg (193 lb 9 oz)  Body mass index is 24.19 kg/m².    Physical Exam  Vitals reviewed.   Constitutional:        General: He is not in acute distress.  HENT:      Head: Normocephalic.   Eyes:      Extraocular Movements: Extraocular movements intact.   Cardiovascular:      Rate and Rhythm: Tachycardia present.   Pulmonary:      Effort: Pulmonary effort is normal. No respiratory distress.   Neurological:      Mental Status: He is alert and oriented to person, place, and time.      Cranial Nerves: Cranial nerves 2-12 are intact.      Coordination: Finger-Nose-Finger Test normal.      Deep Tendon Reflexes:      Reflex Scores:       Tricep reflexes are 2+ on the right side and 2+ on the left side.       Bicep reflexes are 2+ on the right side and 2+ on the left side.       Brachioradialis reflexes are 2+ on the right side and 2+ on the left side.       Patellar reflexes are 2+ on the right side and 2+ on the left side.       Achilles reflexes are 2+ on the right side and 1+ on the left side.  Psychiatric:         Speech: Speech normal.       NEUROLOGICAL EXAMINATION:     MENTAL STATUS   Oriented to person, place, and time.   Attention: normal. Concentration: normal.   Speech: speech is normal   Level of consciousness: alert    CRANIAL NERVES   Cranial nerves II through XII intact.     MOTOR EXAM   Muscle bulk: normal  Overall muscle tone: normal    Strength   Right neck flexion: 5/5  Left neck flexion: 5/5  Right neck extension: 5/5  Left neck extension: 5/5  Right deltoid: 5/5  Left deltoid: 5/5  Right biceps: 5/5  Left biceps: 5/5  Right triceps: 5/5  Left triceps: 5/5  Right wrist flexion: 5/5  Left wrist flexion: 5/5  Right wrist extension: 5/5  Left wrist extension: 5/5  Right interossei: 5/5  Left interossei: 5/5  Right abdominals: 5/5  Left abdominals: 5/5  Right iliopsoas: 5/5  Left iliopsoas: 5/5  Right quadriceps: 5/5  Left quadriceps: 5/5  Right hamstrin/5  Left hamstrin/5  Right glutei: 5/5  Left glutei: 5/5  Right anterior tibial: 5/5  Left anterior tibial: 5/5  Right posterior tibial: 5/5  Left posterior  tibial: 5/5  Right peroneal: 5/5  Left peroneal: 5/5  Right gastroc: 5/5  Left gastroc: 5/5    REFLEXES     Reflexes   Right brachioradialis: 2+  Left brachioradialis: 2+  Right biceps: 2+  Left biceps: 2+  Right triceps: 2+  Left triceps: 2+  Right patellar: 2+  Left patellar: 2+  Right achilles: 2+  Left achilles: 1+  Right ankle clonus: absent  Left ankle clonus: absent    SENSORY EXAM   Right arm light touch: normal  Left arm light touch: decreased from fingers  Right leg light touch: normal  Left leg light touch: decreased from knee  Sensory deficit distribution on left: median       Paresthesias in L 1-3 digits  Paresthesias in L foot up to ankle  Decreased sensation from L knee to L ankle     GAIT AND COORDINATION      Coordination   Finger to nose coordination: normal    Significant Labs: All pertinent lab results from the past 24 hours have been reviewed.    Significant Imaging: I have reviewed all pertinent imaging results/findings within the past 24 hours.    Assessment and Plan:     * Awareness alteration, transient  37 y.o. male with NID s/p LVAD (6/2022) presents after syncope vs. seizure event in parking garage this morning. Pt reports he felt a warning funny sensation in head then lost consciousness, fell backwards hitting his head on the concrete. He proceeded to his scheduled Cards appt and was back at cognitive baseline. LVAD interrogated without associated alarms, settings not adjusted. He was advised to present to ED where CTH without contrast was unremarkable for acute intracranial pathology. He is currently taking Keppra 1 g BID, which was started during LVAD admission for recurrent episodes of loss of awareness with L TIRDA on EEG and encephalomalacia on imaging. Recently seen by Dr. Devi who considered EMU admission in October for event characterization; however, patient unable to be admitted to the EMU service given continuous milrinone infusion.     9/2 further history of episodes  "obtained. He reports feeling an "electrical sensation" in bifrontal area, which sometimes precedes an event but not always associated with an event. This sensation is worse when trying to concentrate and with eye movement. Prior to an event, he will have weakness and his surroundings appear in slow motion. His significant other reports increase in events with lack of sleep and increased stress. He has been having these events every other day. He has gone a maximum of 4 days without an event and noted he was not taking his tramadol at that time. No associated postictal state with every event, reports postictal symptoms after 2 events.    Recommendations:  --admitted to [facility] service, [facility] EEG ordered, seizure provoking methods begun  --Dc'd Keppra 1 g BID for seizure provocation  --continue current dose of gabapentin, once EEG is completed recommend increasing dose to 300 mg TID for neuropathic pain in L hand and foot  --Sleep deprivation performed and tramadol/benadryl given this morning (9/3) for seizure provocation- plan to repeat tonight. Following this EEG monitoring session during this hospitalization, recommend discontinuing tramadol and other medications that lower seizure threshold.  --continue correction of electrolyte derangements   --seizure precautions        VTE Risk Mitigation (From admission, onward)         Ordered     warfarin (COUMADIN) tablet 5 mg  Daily         09/03/22 1012                Nelson Martinez MD  Neurology  [facility] - Cardiology Stepdown  "

## 2022-09-03 NOTE — ASSESSMENT & PLAN NOTE
NICM, suspecting component of familial dilated cardiomyopathy  Last 2D Echo 9/1/22: LVEF 10%, LVEDD 5.97 cm, AV not opening with VAD  On admission CVP: 7, SVO2: 56, CO: 4.28, CI: 2.12, SVR: 1476    - Current diuretic regimen: home dose of Lasix.  Can't take Bumex or Torsemide due to hives  - GDMT with home dose of Aldactone   - Patient was not evaluated for OHTx due to recently quit smoking in April 2022 at the time of workup  - Blood type A +  - 2g Na dietary restriction, 1500 mL fluid restriction, strict I/Os  - Cont milrinone 0.25

## 2022-09-03 NOTE — ASSESSMENT & PLAN NOTE
BG goal 140 -180     - Increase Levemir to 28 units HS. FBG above goal this AM. FBG above goal.   - Decrease Novolog to 10 units TIDWM. CGM sample provided to patient. Dietary teaching reinforced.   - Moderate Dose SQ Insulin Correction Scale.  - BG Monitoring AC/HS     ** Please call Endocrine for any BG related issues **  ** Please notify Endocrine for any change and/or advance in diet**  Lab Results   Component Value Date    HGBA1C 7.4 (H) 09/01/2022       Discharge Planning:   TBD. Please notify endocrinology prior to discharge.

## 2022-09-03 NOTE — PLAN OF CARE
Pt maintained free from falls/trauma/injuries and skin breakdown. Pt denied pain or discomfort. Pt on continuous EEG monitoring. Pt stayed awake until 2 AM and was waken up by RN at 6 AM. LVAD drsg done by pt's wife. CVP of 7 obtained. Lab drawn. BG monitored and coverage given as ordered. Benadryl and tramadol given at 0630. Plan of care reviewed. Pt verbalized understanding. All questions and concerns addressed. Will continue to monitor.

## 2022-09-03 NOTE — NURSING
LVAD DLES dressing changed by spouse under sterile technique using VAD kit. Pt tolerated well, no bleeding noted, no active drainage noted. Dressing due to be changed 9/3/22. Will continue to monitor.

## 2022-09-04 LAB
ALLENS TEST: ABNORMAL
ANION GAP SERPL CALC-SCNC: 6 MMOL/L (ref 8–16)
APTT BLDCRRT: 30.4 SEC (ref 21–32)
BASOPHILS # BLD AUTO: 0.04 K/UL (ref 0–0.2)
BASOPHILS NFR BLD: 0.4 % (ref 0–1.9)
BUN SERPL-MCNC: 10 MG/DL (ref 6–20)
CALCIUM SERPL-MCNC: 9.7 MG/DL (ref 8.7–10.5)
CHLORIDE SERPL-SCNC: 104 MMOL/L (ref 95–110)
CO2 SERPL-SCNC: 24 MMOL/L (ref 23–29)
CREAT SERPL-MCNC: 1.1 MG/DL (ref 0.5–1.4)
DIFFERENTIAL METHOD: ABNORMAL
EOSINOPHIL # BLD AUTO: 0.2 K/UL (ref 0–0.5)
EOSINOPHIL NFR BLD: 2 % (ref 0–8)
ERYTHROCYTE [DISTWIDTH] IN BLOOD BY AUTOMATED COUNT: 21.6 % (ref 11.5–14.5)
EST. GFR  (NO RACE VARIABLE): >60 ML/MIN/1.73 M^2
GLUCOSE SERPL-MCNC: 117 MG/DL (ref 70–110)
HCO3 UR-SCNC: 27.9 MMOL/L (ref 24–28)
HCT VFR BLD AUTO: 36.6 % (ref 40–54)
HGB BLD-MCNC: 11.7 G/DL (ref 14–18)
IMM GRANULOCYTES # BLD AUTO: 0.03 K/UL (ref 0–0.04)
IMM GRANULOCYTES NFR BLD AUTO: 0.3 % (ref 0–0.5)
INR PPP: 1.8 (ref 0.8–1.2)
LDH SERPL L TO P-CCNC: 220 U/L (ref 110–260)
LYMPHOCYTES # BLD AUTO: 2.3 K/UL (ref 1–4.8)
LYMPHOCYTES NFR BLD: 23.5 % (ref 18–48)
MAGNESIUM SERPL-MCNC: 2.1 MG/DL (ref 1.6–2.6)
MCH RBC QN AUTO: 25.4 PG (ref 27–31)
MCHC RBC AUTO-ENTMCNC: 32 G/DL (ref 32–36)
MCV RBC AUTO: 80 FL (ref 82–98)
MONOCYTES # BLD AUTO: 0.8 K/UL (ref 0.3–1)
MONOCYTES NFR BLD: 8.2 % (ref 4–15)
NEUTROPHILS # BLD AUTO: 6.4 K/UL (ref 1.8–7.7)
NEUTROPHILS NFR BLD: 65.6 % (ref 38–73)
NRBC BLD-RTO: 0 /100 WBC
PCO2 BLDA: 52.8 MMHG (ref 35–45)
PH SMN: 7.33 [PH] (ref 7.35–7.45)
PLATELET # BLD AUTO: 319 K/UL (ref 150–450)
PMV BLD AUTO: 9.5 FL (ref 9.2–12.9)
PO2 BLDA: 34 MMHG (ref 40–60)
POC BE: 2 MMOL/L
POC SATURATED O2: 59 % (ref 95–100)
POC TCO2: 29 MMOL/L (ref 24–29)
POCT GLUCOSE: 127 MG/DL (ref 70–110)
POCT GLUCOSE: 226 MG/DL (ref 70–110)
POCT GLUCOSE: 253 MG/DL (ref 70–110)
POTASSIUM SERPL-SCNC: 4.3 MMOL/L (ref 3.5–5.1)
PROTHROMBIN TIME: 17.9 SEC (ref 9–12.5)
RBC # BLD AUTO: 4.6 M/UL (ref 4.6–6.2)
SAMPLE: ABNORMAL
SITE: ABNORMAL
SODIUM SERPL-SCNC: 134 MMOL/L (ref 136–145)
WBC # BLD AUTO: 9.77 K/UL (ref 3.9–12.7)

## 2022-09-04 PROCEDURE — 85025 COMPLETE CBC W/AUTO DIFF WBC: CPT | Performed by: PHYSICIAN ASSISTANT

## 2022-09-04 PROCEDURE — 25000003 PHARM REV CODE 250: Performed by: PHYSICIAN ASSISTANT

## 2022-09-04 PROCEDURE — 25000003 PHARM REV CODE 250: Performed by: STUDENT IN AN ORGANIZED HEALTH CARE EDUCATION/TRAINING PROGRAM

## 2022-09-04 PROCEDURE — 83615 LACTATE (LD) (LDH) ENZYME: CPT | Performed by: PHYSICIAN ASSISTANT

## 2022-09-04 PROCEDURE — 99233 PR SUBSEQUENT HOSPITAL CARE,LEVL III: ICD-10-PCS | Mod: ,,, | Performed by: PSYCHIATRY & NEUROLOGY

## 2022-09-04 PROCEDURE — 27000248 HC VAD-ADDITIONAL DAY

## 2022-09-04 PROCEDURE — 93750 PR INTERROGATE VENT ASSIST DEV, IN PERSON, W PHYSICIAN ANALYSIS: ICD-10-PCS | Mod: ,,, | Performed by: INTERNAL MEDICINE

## 2022-09-04 PROCEDURE — 99233 SBSQ HOSP IP/OBS HIGH 50: CPT | Mod: ,,, | Performed by: INTERNAL MEDICINE

## 2022-09-04 PROCEDURE — 20600001 HC STEP DOWN PRIVATE ROOM

## 2022-09-04 PROCEDURE — 80048 BASIC METABOLIC PNL TOTAL CA: CPT | Performed by: PHYSICIAN ASSISTANT

## 2022-09-04 PROCEDURE — 95714 VEEG EA 12-26 HR UNMNTR: CPT

## 2022-09-04 PROCEDURE — 95720 PR EEG, W/VIDEO, CONT RECORD, I&R, >12<26 HRS: ICD-10-PCS | Mod: ,,, | Performed by: PSYCHIATRY & NEUROLOGY

## 2022-09-04 PROCEDURE — 63600175 PHARM REV CODE 636 W HCPCS: Performed by: PHYSICIAN ASSISTANT

## 2022-09-04 PROCEDURE — 85610 PROTHROMBIN TIME: CPT | Performed by: PHYSICIAN ASSISTANT

## 2022-09-04 PROCEDURE — 85730 THROMBOPLASTIN TIME PARTIAL: CPT | Performed by: PHYSICIAN ASSISTANT

## 2022-09-04 PROCEDURE — 99900035 HC TECH TIME PER 15 MIN (STAT)

## 2022-09-04 PROCEDURE — 83735 ASSAY OF MAGNESIUM: CPT | Performed by: PHYSICIAN ASSISTANT

## 2022-09-04 PROCEDURE — 95720 EEG PHY/QHP EA INCR W/VEEG: CPT | Mod: ,,, | Performed by: PSYCHIATRY & NEUROLOGY

## 2022-09-04 PROCEDURE — 63600175 PHARM REV CODE 636 W HCPCS: Performed by: NURSE PRACTITIONER

## 2022-09-04 PROCEDURE — 93750 INTERROGATION VAD IN PERSON: CPT | Mod: ,,, | Performed by: INTERNAL MEDICINE

## 2022-09-04 PROCEDURE — 99233 SBSQ HOSP IP/OBS HIGH 50: CPT | Mod: ,,, | Performed by: PSYCHIATRY & NEUROLOGY

## 2022-09-04 PROCEDURE — 99233 PR SUBSEQUENT HOSPITAL CARE,LEVL III: ICD-10-PCS | Mod: ,,, | Performed by: INTERNAL MEDICINE

## 2022-09-04 RX ADMIN — FUROSEMIDE 80 MG: 80 TABLET ORAL at 09:09

## 2022-09-04 RX ADMIN — FAMOTIDINE 40 MG: 20 TABLET ORAL at 09:09

## 2022-09-04 RX ADMIN — INSULIN ASPART 10 UNITS: 100 INJECTION, SOLUTION INTRAVENOUS; SUBCUTANEOUS at 09:09

## 2022-09-04 RX ADMIN — POTASSIUM CHLORIDE 40 MEQ: 1500 TABLET, EXTENDED RELEASE ORAL at 04:09

## 2022-09-04 RX ADMIN — INSULIN ASPART 6 UNITS: 100 INJECTION, SOLUTION INTRAVENOUS; SUBCUTANEOUS at 12:09

## 2022-09-04 RX ADMIN — OMEGA-3-ACID ETHYL ESTERS 2 G: 1 CAPSULE, LIQUID FILLED ORAL at 08:09

## 2022-09-04 RX ADMIN — GABAPENTIN 100 MG: 100 CAPSULE ORAL at 04:09

## 2022-09-04 RX ADMIN — BUSPIRONE HYDROCHLORIDE 2.5 MG: 5 TABLET ORAL at 06:09

## 2022-09-04 RX ADMIN — GABAPENTIN 100 MG: 100 CAPSULE ORAL at 09:09

## 2022-09-04 RX ADMIN — POTASSIUM CHLORIDE 40 MEQ: 1500 TABLET, EXTENDED RELEASE ORAL at 09:09

## 2022-09-04 RX ADMIN — SPIRONOLACTONE 25 MG: 25 TABLET, FILM COATED ORAL at 09:09

## 2022-09-04 RX ADMIN — WARFARIN SODIUM 5 MG: 5 TABLET ORAL at 04:09

## 2022-09-04 RX ADMIN — INSULIN ASPART 10 UNITS: 100 INJECTION, SOLUTION INTRAVENOUS; SUBCUTANEOUS at 05:09

## 2022-09-04 RX ADMIN — POTASSIUM CHLORIDE 40 MEQ: 1500 TABLET, EXTENDED RELEASE ORAL at 08:09

## 2022-09-04 RX ADMIN — GABAPENTIN 100 MG: 100 CAPSULE ORAL at 08:09

## 2022-09-04 RX ADMIN — TRAMADOL HYDROCHLORIDE 50 MG: 50 TABLET, COATED ORAL at 06:09

## 2022-09-04 RX ADMIN — Medication 100 MG: at 09:09

## 2022-09-04 RX ADMIN — DIPHENHYDRAMINE HYDROCHLORIDE 50 MG: 50 CAPSULE ORAL at 06:09

## 2022-09-04 RX ADMIN — Medication 800 MG: at 08:09

## 2022-09-04 RX ADMIN — MIRTAZAPINE 15 MG: 15 TABLET, FILM COATED ORAL at 08:09

## 2022-09-04 RX ADMIN — MILRINONE LACTATE IN DEXTROSE 0.25 MCG/KG/MIN: 200 INJECTION, SOLUTION INTRAVENOUS at 04:09

## 2022-09-04 RX ADMIN — Medication 800 MG: at 09:09

## 2022-09-04 RX ADMIN — OMEGA-3-ACID ETHYL ESTERS 2 G: 1 CAPSULE, LIQUID FILLED ORAL at 09:09

## 2022-09-04 RX ADMIN — FERROUS GLUCONATE 324 MG: 324 TABLET ORAL at 09:09

## 2022-09-04 RX ADMIN — INSULIN ASPART 10 UNITS: 100 INJECTION, SOLUTION INTRAVENOUS; SUBCUTANEOUS at 12:09

## 2022-09-04 RX ADMIN — MILRINONE LACTATE IN DEXTROSE 0.25 MCG/KG/MIN: 200 INJECTION, SOLUTION INTRAVENOUS at 03:09

## 2022-09-04 RX ADMIN — INSULIN ASPART 4 UNITS: 100 INJECTION, SOLUTION INTRAVENOUS; SUBCUTANEOUS at 09:09

## 2022-09-04 NOTE — PROGRESS NOTES
09/04/2022  John Alaniz    Current provider:  Dalia Crum MD    Device interrogation:  TXP LVAD INTERROGATIONS 9/4/2022 9/4/2022 9/4/2022 9/3/2022 9/3/2022 9/3/2022 9/3/2022   Type HeartMate3 HeartMate3 HeartMate3 HeartMate3 HeartMate3 HeartMate3 HeartMate3   Flow 3.4 3.4 3.9 3.8 3.2 3.6 3.5   Speed 5100 5100 5100 5100 5100 5100 5100   PI 8.2 8.3 6.2 6.0 9.1 8.3 9.5   Power (Serna) 3.9 3.8 3.8 3.8 3.9 3.8 3.9   LSL 4700 4700 4700 4700 4700 4700 4700   Low Flow Alarm - - - no - - -   High Power Alarm - - - no - - -   Pulsatility - - Intermittent pulse Intermittent pulse - Intermittent pulse Pulse          Rounded on Kevan Queen to ensure all mechanical assist device settings (IABP or VAD) were appropriate and all parameters were within limits.  I was able to ensure all back up equipment was present, the staff had no issues, and the Perfusion Department daily rounding was complete.      For implantable VADs: Interrogation of Ventricular assist device was performed with analysis of device parameters and review of device function. I have personally reviewed the interrogation findings and agree with findings as stated.     In emergency, the nursing units have been notified to contact the perfusion department either by:  Calling [phone] from 630am to 4pm Mon thru Fri, utilizing the On-Call Finder functionality of Epic and searching for Perfusion, or by contacting the hospital  from 4pm to 630am and on weekends and asking to speak with the perfusionist on call.    10:38 AM

## 2022-09-04 NOTE — PROGRESS NOTES
[facility] - Cardiology Stepdown  Heart Transplant  Progress Note    Patient Name: Kevan Queen  MRN: [id number]  Admission Date: 9/1/2022  Hospital Length of Stay: 3 days  Attending Physician: Dalia Crum MD  Primary Care Provider: ORALIA Cline  Principal Problem:Awareness alteration, transient    Subjective:     Interval History: NAEON. Sleep deprived overnight and still undergoing seizure inducing protocol. No seizures noted yet. EEG with some cerebral dysfunction/encephalopathy.   No other complaints this morning, VAD functioning well.     CVP this morning was 6.  SVO2 59  CO 4.85 CI 2.24  SVR 1238    On milrinone 0.25.     Continuous Infusions:   milrinone 20mg/100ml D5W (200mcg/ml) 0.25 mcg/kg/min (09/04/22 0314)     Scheduled Meds:   busPIRone  7.5 mg Oral Daily    famotidine  40 mg Oral Daily    ferrous gluconate  324 mg Oral Daily with breakfast    furosemide  80 mg Oral Daily    gabapentin  100 mg Oral TID    insulin aspart U-100  10 Units Subcutaneous TIDWM    insulin detemir U-100  28 Units Subcutaneous QHS    INV ASPIRIN 100MG/PLACEBO  100 mg Oral Daily    LIDOcaine  2 patch Transdermal Q24H    magnesium oxide  800 mg Oral BID    mirtazapine  15 mg Oral Nightly    omega-3 acid ethyl esters  2 g Oral BID    potassium chloride SA  40 mEq Oral TID    senna-docusate 8.6-50 mg  2 tablet Oral Daily    spironolactone  25 mg Oral Daily    warfarin  5 mg Oral Daily     PRN Meds:acetaminophen, albuterol, dextrose 10%, dextrose 10%, glucagon (human recombinant), glucose, glucose, insulin aspart U-100, lorazepam    Review of patient's allergies indicates:   Allergen Reactions    Aspirin Other (See Comments)     Mr. Thacker is enrolled in Dr. Paige's Alon Trial and cannot have any aspirin and/or aspirin-containing products. DO NOT cancel any orders for INV Aspirin 100 mg/Placebo. If you have questions, please contact Isabel @ [phone], [phone],[email or web address], secure chat or MS Teams message.     Bumex [bumetanide] Hives    Lactose Diarrhea     Other reaction(s): Abdominal distension, gaseous    Torsemide Hives     Objective:     Vital Signs (Most Recent):  Temp: 98.3 °F (36.8 °C) (09/04/22 0741)  Pulse: 79 (09/04/22 0741)  Resp: 16 (09/04/22 0741)  BP: (!) 100/58 (09/04/22 0741)  SpO2: 96 % (09/04/22 0741) Vital Signs (24h Range):  Temp:  [97.3 °F (36.3 °C)-98.4 °F (36.9 °C)] 98.3 °F (36.8 °C)  Pulse:  [] 79  Resp:  [16-20] 16  SpO2:  [96 %-99 %] 96 %  BP: ()/(0-80) 100/58     Patient Vitals for the past 72 hrs (Last 3 readings):   Weight   09/03/22 0752 87.8 kg (193 lb 9 oz)   09/02/22 1400 85.3 kg (188 lb 0.8 oz)   09/02/22 0900 85.3 kg (188 lb 0.8 oz)       Body mass index is 24.19 kg/m².      Intake/Output Summary (Last 24 hours) at 9/4/2022 0952  Last data filed at 9/4/2022 0000  Gross per 24 hour   Intake 342 ml   Output 575 ml   Net -233 ml         Hemodynamic Parameters:  CVP:  [6 mmHg] 6 mmHg    Telemetry: Reviewed    Physical Exam  Constitutional:       Appearance: Normal appearance.   HENT:      Head: Normocephalic and atraumatic.      Comments: EEG wires in place  Eyes:      Extraocular Movements: Extraocular movements intact.      Pupils: Pupils are equal, round, and reactive to light.   Neck:      Comments: No JVD elevation  Cardiovascular:      Rate and Rhythm: Normal rate and regular rhythm.      Pulses: Normal pulses.      Heart sounds: Normal heart sounds. No murmur heard.     Comments: Smooth VAD hum    Pulmonary:      Effort: Pulmonary effort is normal.      Breath sounds: Normal breath sounds.   Abdominal:      General: Abdomen is flat. There is no distension.      Comments: DLES intact    Musculoskeletal:         General: Normal range of motion.      Cervical back: Neck supple.   Skin:     General: Skin is warm.   Neurological:      General: No focal deficit present.      Mental Status: He is alert and oriented to person, place, and time.       Significant  Labs:  CBC:  Recent Labs   Lab 09/02/22  0416 09/03/22  0507 09/04/22  0600   WBC 12.72* 9.30 9.77   RBC 4.60 4.49* 4.60   HGB 12.3* 11.4* 11.7*   HCT 36.2* 35.7* 36.6*    306 319   MCV 79* 80* 80*   MCH 26.7* 25.4* 25.4*   MCHC 34.0 31.9* 32.0       BNP:  Recent Labs   Lab 09/01/22  0930 09/02/22  0416   BNP 33 43       CMP:  Recent Labs   Lab 09/01/22  0930 09/01/22  1039 09/02/22 0416 09/03/22  0507 09/04/22  0600   * 235* 190* 280* 117*   CALCIUM 9.9 9.7 10.1 9.5 9.7   ALBUMIN 3.8 3.8 3.6  --   --    PROT 8.7* 8.9* 8.2  --   --    * 134* 138 136 134*   K 3.7 4.5 4.1 4.2 4.3   CO2 25 22* 25 26 24   CL 99 100 103 103 104   BUN 17 17 17 13 10   CREATININE 1.7* 1.6* 1.3 1.3 1.1   ALKPHOS 126 131 117  --   --    ALT 35 40 32  --   --    AST 38 57* 36  --   --    BILITOT 0.4 0.4 0.5  --   --         Coagulation:   Recent Labs   Lab 09/02/22 0416 09/03/22  0507 09/04/22  0600   INR 1.9* 1.6* 1.8*   APTT 33.3* 29.5 30.4       LDH:  Recent Labs   Lab 09/02/22 0416 09/03/22  0507 09/04/22  0600    248 220       Microbiology:  Microbiology Results (last 7 days)       ** No results found for the last 168 hours. **            I have reviewed all pertinent labs within the past 24 hours.    Estimated Creatinine Clearance: 109.9 mL/min (based on SCr of 1.1 mg/dL).    Diagnostic Results:  I have reviewed all pertinent imaging results/findings within the past 24 hours.    Assessment and Plan:     Mr. 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 He also completed a course of IV Abx for staph epi. He was weaned off  but he  "had to restarted due to RVF. He was eventually transitioned to milrinone (secondary to  shortage) now on 0.25 mcg/kg/min. He underwent echo this AM and was seen in [facility] clinic today. He did undergo his echo this am however he just informed us that while on his way tot  clinic he had a seizure episode and hit his head. CT scan in the ED negative for intracranial hemorrhage. He reports being lightheaded before losing consciousness and being confused upon waking. He also reports a similar episode earlier this week when he was eating out at restaurant and he was reported by his wfe to "doze off" and not remember and he also was confused upon waking from that episode as well. Denies biting his tongue. VAD speed is at 5100 rpm. No VAD alarms noted on interrogation occasional PI events . BP is 72 mm of Hg. DLES is a "1". INR is therapeutic at 2.05. LDh is at baseline.       * Awareness alteration, transient  Syncopal event vs seizure prior to clinic appt, patient noted to have a history of temporal lobe seizures on Keppra.  CTH negative for acute intracranial process.    - Neurology consulted and 24 hour EEG in process, now inducing seizures with medication trials  - Holding Keppra per Neurology  - DC tramadol, muscle relaxants, all seizure threshold lowering medications  - Device interrogation with [employer] done and per verbal report: only one episode of NSVT on 8/10  - Interrogation of VAD function within normal limits without alarms or low flows.     Temporal lobe seizure  - On Keppra 1g BID (holding while inducing seizures)  - Neurology consulted.  See above     LVAD (left ventricular assist device) present  S/p DT HM3 with MVR plus temporary support with Protek Duo for RV support 6/29 ([place]recia)  On Milrinone at home 0.25  GASTON trial   Procedure: Device Interrogation Including analysis of device parameters  Current Settings: Ventricular Assist Device  Review of device function is stable    TXP LVAD " INTERROGATIONS 9/4/2022 9/4/2022 9/4/2022 9/3/2022 9/3/2022 9/3/2022 9/3/2022   Type HeartMate3 HeartMate3 HeartMate3 HeartMate3 HeartMate3 HeartMate3 HeartMate3   Flow 3.4 3.4 3.9 3.8 3.2 3.6 3.5   Speed 5100 5100 5100 5100 5100 5100 5100   PI 8.2 8.3 6.2 6.0 9.1 8.3 9.5   Power (Serna) 3.9 3.8 3.8 3.8 3.9 3.8 3.9   LSL 4700 4700 4700 4700 4700 4700 4700   Low Flow Alarm - - - no - - -   High Power Alarm - - - no - - -   Pulsatility - - Intermittent pulse Intermittent pulse - Intermittent pulse Pulse     - Continue coumadin.  INR subtherapeutic today at 1.8 (goal 2.0-3.0). Previous home dose was 4.5 on Mon, Thurs, and 3mg Qdaily.   - Continue 5mg daily for now. Tomorrow if INR therapeutic will decrease to 3mg.   - LDH stable  - Continue PO Lasix  - RHC 7/21/22  with speed 5100 RA: 14, RV: 41/8 (14), PA: 42/17 (25), PWP: 16/17 (15), CO: 5.32, CI: 2.51.  Was scheduled to have repeat RHC as an outpatient today but was postponed as patient with EEG attached.  Will plan to get repeat RHC in coming days  - ECHO 9/1 with speed at 5100- EF: 10%, LVEDD: 5.97mc     Type 2 diabetes mellitus with hyperglycemia  - Endocrine consulted and modifying scheduled insulin as needed   - Moderate Dose SQ Insulin Correction Scale.  - BG Monitoring AC/HS     Chronic combined systolic and diastolic congestive heart failure  NICM, suspecting component of familial dilated cardiomyopathy  Last 2D Echo 9/1/22: LVEF 10%, LVEDD 5.97 cm, AV not opening with VAD  On admission CVP: 7, SVO2: 56, CO: 4.28, CI: 2.12, SVR: 1476    - Current diuretic regimen: home dose of Lasix.  Can't take Bumex or Torsemide due to hives  - GDMT with home dose of Aldactone   - Patient was not evaluated for OHTx due to recently quit smoking in April 2022 at the time of workup  - Blood type A +  - 2g Na dietary restriction, 1500 mL fluid restriction, strict I/Os  - Cont milrinone 0.25    Anemia of chronic disease  -H/H stable       Kaila Reilly MD  Heart Transplant  [facility] - Cardiology Stepdown

## 2022-09-04 NOTE — PROGRESS NOTES
[facility] - Cardiology Stepdown  Neurology  Progress Note    Patient Name: Kevan Queen  MRN: [id number]  Admission Date: 9/1/2022  Hospital Length of Stay: 3 days  Code Status: Full Code   Attending Provider: Dalia Crum MD  Primary Care Physician: ORALIA Cline   Principal Problem:Awareness alteration, transient    HPI:   37 y.o. male with NIDCM, HFrEF s/p LVAD (6/29/22) with recurrent episodes concerning for seizure vs. Syncope presented to ED 9/1/22 after episode in parking garage prior to Cards appt today. Patient reports episodes started after VAD placement. Typically gets warning like head feels funny with electrical sensation. Today episode was unwitnessed because his wife was getting a wheelchair when episode occurred. He fell backwards hitting his head on the ground. LOC for a few seconds with brief confusion afterwards, was back at cognitive baseline during Cards appt. LVAD was interrogated and no associated alarms with today's episode. He was advised to present to ED given coumadin and head trauma. CTH without unremarkable for acute intracranial pathology. Per chart review, he has been evaluated by Neuro during previous admissions, started on Keppra 1 g BID after EEG with left TIRDA and imaging with L occipital encephalomalacia. He followed up with Dr. Devi 8/22/22 for recurrent episodes despite Keppra. Longest duration of episode free days since hospital discharge is 4 days. On review of medications, patient is taking tramadol daily for pain. He was advised to continue Keppra and scheduled for EMU admission for event characterization in October. Neurology now consulted 9/1 for seizure evaluation.       Overview/Hospital Course:  No notes on file        Subjective:     Interval History: No new episodes overnight; sleep deprivation performed and tramadol/benadryl given      Current Facility-Administered Medications   Medication Dose Route Frequency Provider Last Rate Last Admin     acetaminophen tablet 650 mg  650 mg Oral Q6H PRN DEYA Martinez        albuterol inhaler 2 puff  2 puff Inhalation Q4H PRN Jeff Spencer PA-C        busPIRone split tablet 7.5 mg  7.5 mg Oral Daily Jeff Spencer PA-C   2.5 mg at 09/04/22 0606    dextrose 10% bolus 125 mL  12.5 g Intravenous PRN Luke Cenac, NP        dextrose 10% bolus 250 mL  25 g Intravenous PRN Luke Cenac, NP        famotidine tablet 40 mg  40 mg Oral Daily Jeff Spencer PA-C   40 mg at 09/04/22 0932    ferrous gluconate tablet 324 mg  324 mg Oral Daily with breakfast Jeff Spencer PA-C   324 mg at 09/04/22 0932    furosemide tablet 80 mg  80 mg Oral Daily Jeff Spencer PA-C   80 mg at 09/04/22 0932    gabapentin capsule 100 mg  100 mg Oral TID Jeff Spencer PA-C   100 mg at 09/04/22 0932    glucagon (human recombinant) injection 1 mg  1 mg Intramuscular PRN Luke Cenac, NP        glucose chewable tablet 16 g  16 g Oral PRN Luke Cenac, NP        glucose chewable tablet 24 g  24 g Oral PRN Luke Cenac, NP        insulin aspart U-100 pen 1-10 Units  1-10 Units Subcutaneous QID (AC + HS) PRN Luke Cenac, NP   6 Units at 09/04/22 1245    insulin aspart U-100 pen 10 Units  10 Units Subcutaneous TIDWM Luke Cenac, NP   10 Units at 09/04/22 1244    insulin detemir U-100 pen 28 Units  28 Units Subcutaneous QHS Luke Cenac, NP   28 Units at 09/03/22 2201    INV ASPIRIN 100MG/PLACEBO 100 mg  100 mg Oral Daily Jeff Spencer PA-C   100 mg at 09/04/22 0900    LIDOcaine 5 % patch 2 patch  2 patch Transdermal Q24H Kaila Reilly MD        lorazepam injection 2 mg  2 mg Intravenous Q5 Min PRN Laura Kern PA-C        magnesium oxide tablet 800 mg  800 mg Oral BID Jeff Spencer PA-C   800 mg at 09/04/22 0932    milrinone 20mg in D5W 100 mL infusion  0.25 mcg/kg/min (Order-Specific) Intravenous Continuous Jeff Spencer PA-C 7 mL/hr at 09/04/22 0314 0.25 mcg/kg/min at 09/04/22 0314    mirtazapine tablet 15 mg  15 mg Oral Nightly Jeff Spencer  BHASKAR   15 mg at 09/03/22 2201    omega-3 acid ethyl esters capsule 2 g  2 g Oral BID Jeff Spencer PA-C   2 g at 09/04/22 0932    potassium chloride SA CR tablet 40 mEq  40 mEq Oral TID Jeff Spencer PA-C   40 mEq at 09/04/22 0932    senna-docusate 8.6-50 mg per tablet 2 tablet  2 tablet Oral Daily Jeff Spencer PA-C        spironolactone tablet 25 mg  25 mg Oral Daily Jeff Spencer PA-C   25 mg at 09/04/22 0932    warfarin (COUMADIN) tablet 5 mg  5 mg Oral Daily Chantal Herndon MD   5 mg at 09/03/22 1557       Review of Systems   Constitutional:  Negative for chills and fever.   HENT:  Negative for sore throat and trouble swallowing.    Eyes:  Negative for photophobia and visual disturbance.   Respiratory:  Negative for cough and shortness of breath.    Cardiovascular:  Negative for chest pain and leg swelling.   Gastrointestinal:  Negative for nausea and vomiting.   Musculoskeletal:  Negative for gait problem and neck stiffness.   Skin:  Negative for color change and pallor.   Neurological:  Positive for seizures, syncope and numbness. Negative for facial asymmetry, speech difficulty and weakness.   Psychiatric/Behavioral:  Positive for sleep disturbance. Negative for hallucinations.    Objective:     Vital Signs (Most Recent):  Temp: 98.6 °F (37 °C) (09/04/22 1205)  Pulse: 62 (09/04/22 1205)  Resp: 14 (09/04/22 1205)  BP: (!) 80/0 (09/04/22 1205)  SpO2: 99 % (09/04/22 1205)   Vital Signs (24h Range):  Temp:  [97.3 °F (36.3 °C)-98.6 °F (37 °C)] 98.6 °F (37 °C)  Pulse:  [] 62  Resp:  [14-20] 14  SpO2:  [96 %-99 %] 99 %  BP: ()/(0-80) 80/0     Weight: 86.7 kg (191 lb 2.2 oz)  Body mass index is 23.89 kg/m².    Physical Exam  Vitals reviewed.   Constitutional:       General: He is not in acute distress.  HENT:      Head: Normocephalic.   Eyes:      Extraocular Movements: Extraocular movements intact.   Cardiovascular:      Rate and Rhythm: Tachycardia present.   Pulmonary:      Effort: Pulmonary  effort is normal. No respiratory distress.   Neurological:      Mental Status: He is alert and oriented to person, place, and time.      Cranial Nerves: Cranial nerves 2-12 are intact.      Coordination: Finger-Nose-Finger Test normal.      Deep Tendon Reflexes:      Reflex Scores:       Tricep reflexes are 2+ on the right side and 2+ on the left side.       Bicep reflexes are 2+ on the right side and 2+ on the left side.       Brachioradialis reflexes are 2+ on the right side and 2+ on the left side.       Patellar reflexes are 2+ on the right side and 2+ on the left side.       Achilles reflexes are 2+ on the right side and 1+ on the left side.  Psychiatric:         Speech: Speech normal.       NEUROLOGICAL EXAMINATION:     MENTAL STATUS   Oriented to person, place, and time.   Attention: normal. Concentration: normal.   Speech: speech is normal   Level of consciousness: alert    CRANIAL NERVES   Cranial nerves II through XII intact.     MOTOR EXAM   Muscle bulk: normal  Overall muscle tone: normal    Strength   Right neck flexion: 5/5  Left neck flexion: 5/5  Right neck extension: 5/5  Left neck extension: 5/5  Right deltoid: 5/5  Left deltoid: 5/5  Right biceps: 5/5  Left biceps: 5/5  Right triceps: 5/5  Left triceps: 5/5  Right wrist flexion: 5/5  Left wrist flexion: 5/5  Right wrist extension: 5/5  Left wrist extension: 5/5  Right interossei: 5/5  Left interossei: 5/5  Right abdominals: 5/5  Left abdominals: 5/5  Right iliopsoas: 5/5  Left iliopsoas: 5/5  Right quadriceps: 5/5  Left quadriceps: 5/5  Right hamstrin/5  Left hamstrin/5  Right glutei: 5/5  Left glutei: 5/5  Right anterior tibial: 5/5  Left anterior tibial: 5/5  Right posterior tibial: 5/5  Left posterior tibial: 5/5  Right peroneal: 5/5  Left peroneal: 5/5  Right gastroc: 5/5  Left gastroc: 5/5    REFLEXES     Reflexes   Right brachioradialis: 2+  Left brachioradialis: 2+  Right biceps: 2+  Left biceps: 2+  Right triceps: 2+  Left triceps:  "2+  Right patellar: 2+  Left patellar: 2+  Right achilles: 2+  Left achilles: 1+  Right ankle clonus: absent  Left ankle clonus: absent    SENSORY EXAM   Right arm light touch: normal  Left arm light touch: decreased from fingers  Right leg light touch: normal  Left leg light touch: decreased from knee  Sensory deficit distribution on left: median       Paresthesias in L 1-3 digits  Paresthesias in L foot up to ankle  Decreased sensation from L knee to L ankle     GAIT AND COORDINATION      Coordination   Finger to nose coordination: normal    Significant Labs: All pertinent lab results from the past 24 hours have been reviewed.    Significant Imaging: I have reviewed all pertinent imaging results/findings within the past 24 hours.    Assessment and Plan:     * Awareness alteration, transient  37 y.o. male with [facility] s/p LVAD (6/2022) presents after syncope vs. seizure event in parking garage this morning. Pt reports he felt a warning funny sensation in head then lost consciousness, fell backwards hitting his head on the concrete. He proceeded to his scheduled Cards appt and was back at cognitive baseline. LVAD interrogated without associated alarms, settings not adjusted. He was advised to present to ED where CTH without contrast was unremarkable for acute intracranial pathology. He is currently taking Keppra 1 g BID, which was started during LVAD admission for recurrent episodes of loss of awareness with L TIRDA on EEG and encephalomalacia on imaging. Recently seen by Dr. Devi who considered EMU admission in October for event characterization; however, patient unable to be admitted to the EMU service given continuous milrinone infusion.     9/2 further history of episodes obtained. He reports feeling an "electrical sensation" in bifrontal area, which sometimes precedes an event but not always associated with an event. This sensation is worse when trying to concentrate and with eye movement. Prior to an event, " he will have weakness and his surroundings appear in slow motion. His significant other reports increase in events with lack of sleep and increased stress. He has been having these events every other day. He has gone a maximum of 4 days without an event and noted he was not taking his tramadol at that time. No associated postictal state with every event, reports postictal symptoms after 2 events.    -Sleep deprivation performed and tramadol/benadryl given this morning (9/4) for seizure provocation    Recommendations:  --admitted to [facility] service, [facility] EEG ordered, seizure provoking methods begun  --Dc'd Keppra 1 g BID for seizure provocation  --continue current dose of gabapentin, once EEG is completed recommend increasing dose to 300 mg TID for neuropathic pain in L hand and foot  --Following this EEG monitoring session during this hospitalization, recommend discontinuing tramadol and other medications that lower seizure threshold.  --continue correction of electrolyte derangements   --seizure precautions        VTE Risk Mitigation (From admission, onward)         Ordered     warfarin (COUMADIN) tablet 5 mg  Daily         09/03/22 1012                Nelson Martinez MD  Neurology  [facility] - Cardiology Stepdown

## 2022-09-04 NOTE — ASSESSMENT & PLAN NOTE
"37 y.o. male with [facility] s/p LVAD (6/2022) presents after syncope vs. seizure event in parking garage this morning. Pt reports he felt a warning funny sensation in head then lost consciousness, fell backwards hitting his head on the concrete. He proceeded to his scheduled Cards appt and was back at cognitive baseline. LVAD interrogated without associated alarms, settings not adjusted. He was advised to present to ED where CTH without contrast was unremarkable for acute intracranial pathology. He is currently taking Keppra 1 g BID, which was started during LVAD admission for recurrent episodes of loss of awareness with L TIRDA on EEG and encephalomalacia on imaging. Recently seen by Dr. Devi who considered EMU admission in October for event characterization; however, patient unable to be admitted to the EMU service given continuous milrinone infusion.     9/2 further history of episodes obtained. He reports feeling an "electrical sensation" in bifrontal area, which sometimes precedes an event but not always associated with an event. This sensation is worse when trying to concentrate and with eye movement. Prior to an event, he will have weakness and his surroundings appear in slow motion. His significant other reports increase in events with lack of sleep and increased stress. He has been having these events every other day. He has gone a maximum of 4 days without an event and noted he was not taking his tramadol at that time. No associated postictal state with every event, reports postictal symptoms after 2 events.    -Sleep deprivation performed and tramadol/benadryl given this morning (9/4) for seizure provocation    Recommendations:  --admitted to Cards service, LTM EEG ordered, seizure provoking methods begun  --Dc'd Keppra 1 g BID for seizure provocation  --continue current dose of gabapentin, once EEG is completed recommend increasing dose to 300 mg TID for neuropathic pain in L hand and foot  --Following " Medication Management [address]  [phone] (phone)  [phone] (fax)      Ms. David Frank is a 71 y.o.  female with history of afib who presents today for anticoagulation monitoring and adjustment. Patient verifies current dosing regimen and tablet strength. No missed or extra doses. Patient denies s/s bleeding/bruising/swelling/SOB/chest pain  No blood in urine or stool. No dietary changes. No changes in medication/OTC agents/Herbals. No change in alcohol use or tobacco use. No change in activity level. Patient denies headaches/dizziness/lightheadedness/falls. No vomiting/diarrhea or acute illness. No Procedures scheduled in the future at this time. Patient wearing Holter monitor for 30 days, until July 6    Assessment:   Lab Results   Component Value Date    INR 1.20 06/09/2020    INR 1.10 03/02/2020    INR 1.11 01/08/2020     INR subtherapeutic   Recent Labs     06/09/20  0923   INR 1.20       Plan:  5mg x4 then continue Coumadin 5mg MWF, 2.5mg TThSS. Recheck INR in 1 week. Patient reminded to call the Anticoagulation Clinic with any signs or symptoms of bleeding or with any medication changes. Patient given instructions utilizing the teach back method. Discharged ambulatory in no apparent distress. After visit summary printed and reviewed with patient. Medications reviewed and updated on home medication list Yes    Verbal Consent for Consult Agreement participation during COVID-19 Pandemic  Verbiage for CPA Consent:  Discussed with patient the Pharmacist Collaborative Practice Agreement. Patient provided verbal and/or electronic (Ian Bonner) consent to be managed by a pharmacist per collaborative practice agreement between the pharmacist and referring physician. This is in lieu of paper consent due to COVID-19 precautions and the use of remote/virtual visits.        CLINICAL PHARMACY CONSULT: MED RECONCILIATION/REVIEW this EEG monitoring session during this hospitalization, recommend discontinuing tramadol and other medications that lower seizure threshold.  --continue correction of electrolyte derangements   --seizure precautions

## 2022-09-04 NOTE — PLAN OF CARE
POC reviewed with patient. VS and doppler pressures obtained. VAD numbers WNL. No alarms noted. No signs or episodes of siezures. Denies pain or SOB. BG monitored and insulin given as scheduled.VAD dressing changed daily with kit by wife. Questions and concerns addressed, Call light in reach.      Problem: Adult Inpatient Plan of Care  Goal: Plan of Care Review  Outcome: Ongoing, Progressing  Goal: Patient-Specific Goal (Individualized)  Outcome: Ongoing, Progressing  Goal: Absence of Hospital-Acquired Illness or Injury  Outcome: Ongoing, Progressing  Goal: Optimal Comfort and Wellbeing  Outcome: Ongoing, Progressing  Goal: Readiness for Transition of Care  Outcome: Ongoing, Progressing     Problem: Diabetes Comorbidity  Goal: Blood Glucose Level Within Targeted Range  Outcome: Ongoing, Progressing     Problem: Infection  Goal: Absence of Infection Signs and Symptoms  Outcome: Ongoing, Progressing     Problem: Fall Injury Risk  Goal: Absence of Fall and Fall-Related Injury  Outcome: Ongoing, Progressing

## 2022-09-04 NOTE — ASSESSMENT & PLAN NOTE
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  CTH negative for acute intracranial process.    - Neurology consulted and 24 hour EEG in process, now inducing seizures with medication trials  - Holding Keppra per Neurology  - DC tramadol, muscle relaxants, all seizure threshold lowering medications  - Device interrogation with [employer] done and per verbal report: only one episode of NSVT on 8/10  - Interrogation of VAD function within normal limits without alarms or low flows.

## 2022-09-04 NOTE — CARE UPDATE
BG goal 140-180    -A1C   Lab Results   Component Value Date    HGBA1C 7.4 (H) 09/01/2022       -HOME REGIMEN: Levemir 22 units HS.   Novolog 10 units TIDWM.      -INPATIENT REGIMEN: Levemir 28 units q HS, Novolog 10 units TID with meals and correction scale     -GLUCOSE TREND FOR THE PAST 24HRS: 129-185    -NO HYPOGYCEMIAS NOTED     -TOLERATING 100% OF PO DIET     -Diet: Diet [facility]; Fluid - 1500mL      -Steroids - none    -Tube Feeds - none      Remains in CSU. NAEON. BG reasonably well controlled on current SQ insulin regimen. No changes to plan of care.       Plan:  -Continue Levemir 28 units q HS  -Continue Novolog 10 units TID with meals  -Moderate Dose correction scale  -BG monitoring ac/hs    Discharge planning: TBD; please notify endocrine prior to d/c.     Endocrine to continue to follow    ** Please call Endocrine for any BG related issues **

## 2022-09-04 NOTE — SUBJECTIVE & OBJECTIVE
Interval History: NAEON. Sleep deprived overnight and still undergoing seizure inducing protocol. No seizures noted yet. EEG with some cerebral dysfunction/encephalopathy.   No other complaints this morning, VAD functioning well.     CVP this morning was 6.  SVO2 59  CO 4.85 CI 2.24  SVR 1238    On milrinone 0.25.     Continuous Infusions:   milrinone 20mg/100ml D5W (200mcg/ml) 0.25 mcg/kg/min (09/04/22 0314)     Scheduled Meds:   busPIRone  7.5 mg Oral Daily    famotidine  40 mg Oral Daily    ferrous gluconate  324 mg Oral Daily with breakfast    furosemide  80 mg Oral Daily    gabapentin  100 mg Oral TID    insulin aspart U-100  10 Units Subcutaneous TIDWM    insulin detemir U-100  28 Units Subcutaneous QHS    INV ASPIRIN 100MG/PLACEBO  100 mg Oral Daily    LIDOcaine  2 patch Transdermal Q24H    magnesium oxide  800 mg Oral BID    mirtazapine  15 mg Oral Nightly    omega-3 acid ethyl esters  2 g Oral BID    potassium chloride SA  40 mEq Oral TID    senna-docusate 8.6-50 mg  2 tablet Oral Daily    spironolactone  25 mg Oral Daily    warfarin  5 mg Oral Daily     PRN Meds:acetaminophen, albuterol, dextrose 10%, dextrose 10%, glucagon (human recombinant), glucose, glucose, insulin aspart U-100, lorazepam    Review of patient's allergies indicates:   Allergen Reactions    Aspirin Other (See Comments)     Mr. Thacker is enrolled in Dr. Paige's Alon Trial and cannot have any aspirin and/or aspirin-containing products. DO NOT cancel any orders for INV Aspirin 100 mg/Placebo. If you have questions, please contact Isabel @ [phone], [phone],[email or web address], secure chat or MS Teams message.    Bumex [bumetanide] Hives    Lactose Diarrhea     Other reaction(s): Abdominal distension, gaseous    Torsemide Hives     Objective:     Vital Signs (Most Recent):  Temp: 98.3 °F (36.8 °C) (09/04/22 0741)  Pulse: 79 (09/04/22 0741)  Resp: 16 (09/04/22 0741)  BP: (!) 100/58 (09/04/22 0741)  SpO2: 96 % (09/04/22 0741) Vital  Signs (24h Range):  Temp:  [97.3 °F (36.3 °C)-98.4 °F (36.9 °C)] 98.3 °F (36.8 °C)  Pulse:  [] 79  Resp:  [16-20] 16  SpO2:  [96 %-99 %] 96 %  BP: ()/(0-80) 100/58     Patient Vitals for the past 72 hrs (Last 3 readings):   Weight   09/03/22 0752 87.8 kg (193 lb 9 oz)   09/02/22 1400 85.3 kg (188 lb 0.8 oz)   09/02/22 0900 85.3 kg (188 lb 0.8 oz)       Body mass index is 24.19 kg/m².      Intake/Output Summary (Last 24 hours) at 9/4/2022 0952  Last data filed at 9/4/2022 0000  Gross per 24 hour   Intake 342 ml   Output 575 ml   Net -233 ml         Hemodynamic Parameters:  CVP:  [6 mmHg] 6 mmHg    Telemetry: Reviewed    Physical Exam  Constitutional:       Appearance: Normal appearance.   HENT:      Head: Normocephalic and atraumatic.      Comments: EEG wires in place  Eyes:      Extraocular Movements: Extraocular movements intact.      Pupils: Pupils are equal, round, and reactive to light.   Neck:      Comments: No JVD elevation  Cardiovascular:      Rate and Rhythm: Normal rate and regular rhythm.      Pulses: Normal pulses.      Heart sounds: Normal heart sounds. No murmur heard.     Comments: Smooth VAD hum    Pulmonary:      Effort: Pulmonary effort is normal.      Breath sounds: Normal breath sounds.   Abdominal:      General: Abdomen is flat. There is no distension.      Comments: DLES intact    Musculoskeletal:         General: Normal range of motion.      Cervical back: Neck supple.   Skin:     General: Skin is warm.   Neurological:      General: No focal deficit present.      Mental Status: He is alert and oriented to person, place, and time.       Significant Labs:  CBC:  Recent Labs   Lab 09/02/22  0416 09/03/22  0507 09/04/22  0600   WBC 12.72* 9.30 9.77   RBC 4.60 4.49* 4.60   HGB 12.3* 11.4* 11.7*   HCT 36.2* 35.7* 36.6*    306 319   MCV 79* 80* 80*   MCH 26.7* 25.4* 25.4*   MCHC 34.0 31.9* 32.0       BNP:  Recent Labs   Lab 09/01/22  0930 09/02/22  0416   BNP 33 43        CMP:  Recent Labs   Lab 09/01/22  0930 09/01/22  1039 09/02/22  0416 09/03/22  0507 09/04/22  0600   * 235* 190* 280* 117*   CALCIUM 9.9 9.7 10.1 9.5 9.7   ALBUMIN 3.8 3.8 3.6  --   --    PROT 8.7* 8.9* 8.2  --   --    * 134* 138 136 134*   K 3.7 4.5 4.1 4.2 4.3   CO2 25 22* 25 26 24   CL 99 100 103 103 104   BUN 17 17 17 13 10   CREATININE 1.7* 1.6* 1.3 1.3 1.1   ALKPHOS 126 131 117  --   --    ALT 35 40 32  --   --    AST 38 57* 36  --   --    BILITOT 0.4 0.4 0.5  --   --         Coagulation:   Recent Labs   Lab 09/02/22  0416 09/03/22  0507 09/04/22  0600   INR 1.9* 1.6* 1.8*   APTT 33.3* 29.5 30.4       LDH:  Recent Labs   Lab 09/02/22  0416 09/03/22  0507 09/04/22  0600    248 220       Microbiology:  Microbiology Results (last 7 days)       ** No results found for the last 168 hours. **            I have reviewed all pertinent labs within the past 24 hours.    Estimated Creatinine Clearance: 109.9 mL/min (based on SCr of 1.1 mg/dL).    Diagnostic Results:  I have reviewed all pertinent imaging results/findings within the past 24 hours.

## 2022-09-04 NOTE — SUBJECTIVE & OBJECTIVE
Subjective:     Interval History: No new episodes overnight; sleep deprivation performed and tramadol/benadryl given      Current Facility-Administered Medications   Medication Dose Route Frequency Provider Last Rate Last Admin    acetaminophen tablet 650 mg  650 mg Oral Q6H PRN DEYA Martinez        albuterol inhaler 2 puff  2 puff Inhalation Q4H PRN Jeff Spencer PA-C        busPIRone split tablet 7.5 mg  7.5 mg Oral Daily Jeff Spencer PA-C   2.5 mg at 09/04/22 0606    dextrose 10% bolus 125 mL  12.5 g Intravenous PRN Luke Cenac, NP        dextrose 10% bolus 250 mL  25 g Intravenous PRN Luke Cenac, NP        famotidine tablet 40 mg  40 mg Oral Daily Jeff Spencer PA-C   40 mg at 09/04/22 0932    ferrous gluconate tablet 324 mg  324 mg Oral Daily with breakfast Jeff Spencer PA-C   324 mg at 09/04/22 0932    furosemide tablet 80 mg  80 mg Oral Daily Jeff Spencer PA-C   80 mg at 09/04/22 0932    gabapentin capsule 100 mg  100 mg Oral TID Jeff Spencer PA-C   100 mg at 09/04/22 0932    glucagon (human recombinant) injection 1 mg  1 mg Intramuscular PRN Luke Cenac, NP        glucose chewable tablet 16 g  16 g Oral PRN Luke Cenac, NP        glucose chewable tablet 24 g  24 g Oral PRN Luke Cenac, NP        insulin aspart U-100 pen 1-10 Units  1-10 Units Subcutaneous QID (AC + HS) PRN Luke Cenac, NP   6 Units at 09/04/22 1245    insulin aspart U-100 pen 10 Units  10 Units Subcutaneous TIDWM Luke Cenac, NP   10 Units at 09/04/22 1244    insulin detemir U-100 pen 28 Units  28 Units Subcutaneous QHS Luke Cenac, NP   28 Units at 09/03/22 2201    INV ASPIRIN 100MG/PLACEBO 100 mg  100 mg Oral Daily Jeff Spencer PA-C   100 mg at 09/04/22 0900    LIDOcaine 5 % patch 2 patch  2 patch Transdermal Q24H Kaila Reilly MD        lorazepam injection 2 mg  2 mg Intravenous Q5 Min PRN Laura Kern PA-C        magnesium oxide tablet 800 mg  800 mg Oral BID Jeff Spencer PA-C   800 mg at 09/04/22 0932    milrinone 20mg in  D5W 100 mL infusion  0.25 mcg/kg/min (Order-Specific) Intravenous Continuous Jeff Spencer PA-C 7 mL/hr at 09/04/22 0314 0.25 mcg/kg/min at 09/04/22 0314    mirtazapine tablet 15 mg  15 mg Oral Nightly Jeff Spencer PA-C   15 mg at 09/03/22 2201    omega-3 acid ethyl esters capsule 2 g  2 g Oral BID Jeff Spencer PA-C   2 g at 09/04/22 0932    potassium chloride SA CR tablet 40 mEq  40 mEq Oral TID Jeff Spencer PA-C   40 mEq at 09/04/22 0932    senna-docusate 8.6-50 mg per tablet 2 tablet  2 tablet Oral Daily Jeff Spencer PA-C        spironolactone tablet 25 mg  25 mg Oral Daily Jeff Spencer PA-C   25 mg at 09/04/22 0932    warfarin (COUMADIN) tablet 5 mg  5 mg Oral Daily Chantal Herndon MD   5 mg at 09/03/22 1557       Review of Systems   Constitutional:  Negative for chills and fever.   HENT:  Negative for sore throat and trouble swallowing.    Eyes:  Negative for photophobia and visual disturbance.   Respiratory:  Negative for cough and shortness of breath.    Cardiovascular:  Negative for chest pain and leg swelling.   Gastrointestinal:  Negative for nausea and vomiting.   Musculoskeletal:  Negative for gait problem and neck stiffness.   Skin:  Negative for color change and pallor.   Neurological:  Positive for seizures, syncope and numbness. Negative for facial asymmetry, speech difficulty and weakness.   Psychiatric/Behavioral:  Positive for sleep disturbance. Negative for hallucinations.    Objective:     Vital Signs (Most Recent):  Temp: 98.6 °F (37 °C) (09/04/22 1205)  Pulse: 62 (09/04/22 1205)  Resp: 14 (09/04/22 1205)  BP: (!) 80/0 (09/04/22 1205)  SpO2: 99 % (09/04/22 1205)   Vital Signs (24h Range):  Temp:  [97.3 °F (36.3 °C)-98.6 °F (37 °C)] 98.6 °F (37 °C)  Pulse:  [] 62  Resp:  [14-20] 14  SpO2:  [96 %-99 %] 99 %  BP: ()/(0-80) 80/0     Weight: 86.7 kg (191 lb 2.2 oz)  Body mass index is 23.89 kg/m².    Physical Exam  Vitals reviewed.   Constitutional:       General: He is not in  acute distress.  HENT:      Head: Normocephalic.   Eyes:      Extraocular Movements: Extraocular movements intact.   Cardiovascular:      Rate and Rhythm: Tachycardia present.   Pulmonary:      Effort: Pulmonary effort is normal. No respiratory distress.   Neurological:      Mental Status: He is alert and oriented to person, place, and time.      Cranial Nerves: Cranial nerves 2-12 are intact.      Coordination: Finger-Nose-Finger Test normal.      Deep Tendon Reflexes:      Reflex Scores:       Tricep reflexes are 2+ on the right side and 2+ on the left side.       Bicep reflexes are 2+ on the right side and 2+ on the left side.       Brachioradialis reflexes are 2+ on the right side and 2+ on the left side.       Patellar reflexes are 2+ on the right side and 2+ on the left side.       Achilles reflexes are 2+ on the right side and 1+ on the left side.  Psychiatric:         Speech: Speech normal.       NEUROLOGICAL EXAMINATION:     MENTAL STATUS   Oriented to person, place, and time.   Attention: normal. Concentration: normal.   Speech: speech is normal   Level of consciousness: alert    CRANIAL NERVES   Cranial nerves II through XII intact.     MOTOR EXAM   Muscle bulk: normal  Overall muscle tone: normal    Strength   Right neck flexion: 5/5  Left neck flexion: 5/5  Right neck extension: 5/5  Left neck extension: 5/5  Right deltoid: 5/5  Left deltoid: 5/5  Right biceps: 5/5  Left biceps: 5/5  Right triceps: 5/5  Left triceps: 5/5  Right wrist flexion: 5/5  Left wrist flexion: 5/5  Right wrist extension: 5/5  Left wrist extension: 5/5  Right interossei: 5/5  Left interossei: 5/5  Right abdominals: 5/5  Left abdominals: 5/5  Right iliopsoas: 5/5  Left iliopsoas: 5/5  Right quadriceps: 5/5  Left quadriceps: 5/5  Right hamstrin/5  Left hamstrin/5  Right glutei: 5/5  Left glutei: 5/5  Right anterior tibial: 5/5  Left anterior tibial: 5/5  Right posterior tibial: 5/5  Left posterior tibial: 5/5  Right  peroneal: 5/5  Left peroneal: 5/5  Right gastroc: 5/5  Left gastroc: 5/5    REFLEXES     Reflexes   Right brachioradialis: 2+  Left brachioradialis: 2+  Right biceps: 2+  Left biceps: 2+  Right triceps: 2+  Left triceps: 2+  Right patellar: 2+  Left patellar: 2+  Right achilles: 2+  Left achilles: 1+  Right ankle clonus: absent  Left ankle clonus: absent    SENSORY EXAM   Right arm light touch: normal  Left arm light touch: decreased from fingers  Right leg light touch: normal  Left leg light touch: decreased from knee  Sensory deficit distribution on left: median       Paresthesias in L 1-3 digits  Paresthesias in L foot up to ankle  Decreased sensation from L knee to L ankle     GAIT AND COORDINATION      Coordination   Finger to nose coordination: normal    Significant Labs: All pertinent lab results from the past 24 hours have been reviewed.    Significant Imaging: I have reviewed all pertinent imaging results/findings within the past 24 hours.

## 2022-09-04 NOTE — PROCEDURES
24 hr. Video EEG Monitoring    Date/Time: 9/1/2022 10:18 AM  Performed by: Hunter Herman MD  Authorized by: Jeff Spencer PA-C     EXTENDED  ELECTROENCEPHALOGRAM  REPORT    DATE OF SERVICE: 9/3/22-9/4/22  EEG NUMBER: [id number]  REQUESTED BY:  [place]  [place]  [facility] [place]Y   Electroencephalographic (EEG) recording is with electrodes placed according to the International 10-20 placement system.  Thirty two (32) channels of digital signal (sampling rate of 512/sec) including T1 and T2 was simultaneously recorded from the scalp and may include  EKG, EMG, and/or eye monitors.  Recording band pass was 0.1 to 512 hz.  Digital video recording of the patient is simultaneously recorded with the EEG.  The patient is instructed report clinical symptoms which may occur during the recording session.  EEG and video recording is stored and archived in digital format.  Activation procedures which include photic stimulation, hyperventilation and instructing patients to perform simple task are done in selected patients.   The EEG is displayed on a monitor screen and can be reviewed using different montages.  Computer assisted analysis is employed to detect spike and electrographic seizure activity.   The entire record is submitted for computer analysis.  The entire recording is visually reviewed and the times identified by computer analysis as being spikes or seizures are reviewed again.  Compresses spectral analysis (CSA) is also performed on the activity recorded from each individual channel.  This is displayed as a power display of frequencies from 0 to 30 Hz over time.   The CSA is reviewed looking for asymmetries in power between homologous areas of the scalp and then compared with the original EEG recording.     [employer] software was also utilized in the review of this study.  This software suite analyzes the EEG recording in multiple domains.  Coherence and rhythmicity is computed to identify EEG sections  which may contain organized seizures.  Each channel undergoes analysis to detect presence of spike and sharp waves which have special and morphological characteristic of epileptic activity.  The routine EEG recording is converted from spacial into frequency domain.  This is then displayed comparing homologous areas to identify areas of significant asymmetry.  Algorithm to identify non-cortically generated artifact is used to separate eye movement, EMG and other artifact from the EEG.      RECORDING TIMES  Start on 9/3/22 at 07:00:11  Stop on 9/3/22 at 09:35:48  Start on 9/3/22 at 10:35:52  Stop on 9/4/22 at 07:00:03  Start on 9/4/22 at 07:00:09  Stop on 9/4/22 at 09:39:04    A total of  22 hrs of EEG recording was obtained.    EEG FINDINGS  The record shows a fair  organization at rest, consisting of a 8-9 Hz posterior dominant rhythm with good  reactivity. There is mild bilateral beta activity. There is continuous diffuse theta and delta range background slwoing.     Drowsiness is characterized by attenuation of the background, vertex waves, and bilateral theta slowing. Sleep architecture is not seen.    Provocative maneuvers including hyperventilation and photic stimulation were performed.     EKG recording shows a regular  rhythm.    There is no push button or clinical event.    IMPRESSION:  Abnormal study due to mild to moderate  diffuse background slowing consistent with diffuse cerebral dysfunction and encephalopathy which may be on the basis of toxic, metabolic, or primary neuronal disorder.     Hunter Herman MD

## 2022-09-04 NOTE — PLAN OF CARE
Pt free of falls and injury. Pt AAOx4. Fall precautions remain in place. Seizure precautions maintained. Pt independent. Pt on room air. Sats >95%. ST on telemetry with LVAD artifact. LVAD hum present and smooth. VAD numbers and dopplers WDL. Plan of care reviewed with pt. Pt kept awake until 2am. Continuous EEG monitoring in progress. Milrinone gtt infusing at 0.25 mcg/kg/min and 7 ml/hr. Pt resting comfortably with no complaints of pain. Pt denies chest pain, headache, and SOB. No acute distress noted, will continue to monitor.

## 2022-09-04 NOTE — ASSESSMENT & PLAN NOTE
S/p DT HM3 with MVR plus temporary support with Protek Duo for RV support 6/29 ([place])  On Milrinone at home 0.25  GASTON trial   Procedure: Device Interrogation Including analysis of device parameters  Current Settings: Ventricular Assist Device  Review of device function is stable    TXP LVAD INTERROGATIONS 9/4/2022 9/4/2022 9/4/2022 9/3/2022 9/3/2022 9/3/2022 9/3/2022   Type HeartMate3 HeartMate3 HeartMate3 HeartMate3 HeartMate3 HeartMate3 HeartMate3   Flow 3.4 3.4 3.9 3.8 3.2 3.6 3.5   Speed 5100 5100 5100 5100 5100 5100 5100   PI 8.2 8.3 6.2 6.0 9.1 8.3 9.5   Power (Serna) 3.9 3.8 3.8 3.8 3.9 3.8 3.9   LSL 4700 4700 4700 4700 4700 4700 4700   Low Flow Alarm - - - no - - -   High Power Alarm - - - no - - -   Pulsatility - - Intermittent pulse Intermittent pulse - Intermittent pulse Pulse     - Continue coumadin.  INR subtherapeutic today at 1.8 (goal 2.0-3.0). Previous home dose was 4.5 on Mon, Thurs, and 3mg Qdaily.   - Continue 5mg daily for now. Tomorrow if INR therapeutic will decrease to 3mg.   - LDH stable  - Continue PO Lasix  - RHC 7/21/22  with speed 5100 RA: 14, RV: 41/8 (14), PA: 42/17 (25), PWP: 16/17 (15), CO: 5.32, CI: 2.51.  Was scheduled to have repeat RHC as an outpatient today but was postponed as patient with EEG attached.  Will plan to get repeat RHC in coming days  - ECHO 9/1 with speed at 5100- EF: 10%, LVEDD: 5.97mc

## 2022-09-05 LAB
ALBUMIN SERPL BCP-MCNC: 3.4 G/DL (ref 3.5–5.2)
ALLENS TEST: ABNORMAL
ALP SERPL-CCNC: 112 U/L (ref 55–135)
ALT SERPL W/O P-5'-P-CCNC: 37 U/L (ref 10–44)
ANION GAP SERPL CALC-SCNC: 8 MMOL/L (ref 8–16)
APTT BLDCRRT: 29.4 SEC (ref 21–32)
AST SERPL-CCNC: 38 U/L (ref 10–40)
BASOPHILS # BLD AUTO: 0.04 K/UL (ref 0–0.2)
BASOPHILS NFR BLD: 0.5 % (ref 0–1.9)
BILIRUB DIRECT SERPL-MCNC: 0.2 MG/DL (ref 0.1–0.3)
BILIRUB SERPL-MCNC: 0.4 MG/DL (ref 0.1–1)
BNP SERPL-MCNC: 90 PG/ML (ref 0–99)
BUN SERPL-MCNC: 11 MG/DL (ref 6–20)
CALCIUM SERPL-MCNC: 9.7 MG/DL (ref 8.7–10.5)
CHLORIDE SERPL-SCNC: 103 MMOL/L (ref 95–110)
CO2 SERPL-SCNC: 23 MMOL/L (ref 23–29)
CREAT SERPL-MCNC: 1.1 MG/DL (ref 0.5–1.4)
CRP SERPL-MCNC: 2.9 MG/L (ref 0–8.2)
DELSYS: ABNORMAL
DIFFERENTIAL METHOD: ABNORMAL
EOSINOPHIL # BLD AUTO: 0.2 K/UL (ref 0–0.5)
EOSINOPHIL NFR BLD: 2.4 % (ref 0–8)
ERYTHROCYTE [DISTWIDTH] IN BLOOD BY AUTOMATED COUNT: 21.4 % (ref 11.5–14.5)
EST. GFR  (NO RACE VARIABLE): >60 ML/MIN/1.73 M^2
GLUCOSE SERPL-MCNC: 157 MG/DL (ref 70–110)
HCO3 UR-SCNC: 27.4 MMOL/L (ref 24–28)
HCT VFR BLD AUTO: 36.4 % (ref 40–54)
HGB BLD-MCNC: 11.6 G/DL (ref 14–18)
IMM GRANULOCYTES # BLD AUTO: 0.02 K/UL (ref 0–0.04)
IMM GRANULOCYTES NFR BLD AUTO: 0.2 % (ref 0–0.5)
INR PPP: 1.9 (ref 0.8–1.2)
LDH SERPL L TO P-CCNC: 223 U/L (ref 110–260)
LYMPHOCYTES # BLD AUTO: 2.2 K/UL (ref 1–4.8)
LYMPHOCYTES NFR BLD: 25.4 % (ref 18–48)
MAGNESIUM SERPL-MCNC: 2.1 MG/DL (ref 1.6–2.6)
MCH RBC QN AUTO: 25 PG (ref 27–31)
MCHC RBC AUTO-ENTMCNC: 31.9 G/DL (ref 32–36)
MCV RBC AUTO: 78 FL (ref 82–98)
MONOCYTES # BLD AUTO: 0.8 K/UL (ref 0.3–1)
MONOCYTES NFR BLD: 9.3 % (ref 4–15)
NEUTROPHILS # BLD AUTO: 5.4 K/UL (ref 1.8–7.7)
NEUTROPHILS NFR BLD: 62.2 % (ref 38–73)
NRBC BLD-RTO: 0 /100 WBC
PCO2 BLDA: 47.1 MMHG (ref 35–45)
PH SMN: 7.37 [PH] (ref 7.35–7.45)
PLATELET # BLD AUTO: 309 K/UL (ref 150–450)
PMV BLD AUTO: 9.1 FL (ref 9.2–12.9)
PO2 BLDA: 37 MMHG (ref 40–60)
POC BE: 2 MMOL/L
POC SATURATED O2: 68 % (ref 95–100)
POC SVO2: 68 %
POC TCO2: 29 MMOL/L (ref 24–29)
POCT GLUCOSE: 120 MG/DL (ref 70–110)
POCT GLUCOSE: 138 MG/DL (ref 70–110)
POCT GLUCOSE: 168 MG/DL (ref 70–110)
POCT GLUCOSE: 202 MG/DL (ref 70–110)
POCT GLUCOSE: 328 MG/DL (ref 70–110)
POTASSIUM SERPL-SCNC: 4.3 MMOL/L (ref 3.5–5.1)
PREALB SERPL-MCNC: 30 MG/DL (ref 20–43)
PROT SERPL-MCNC: 7.7 G/DL (ref 6–8.4)
PROTHROMBIN TIME: 18.8 SEC (ref 9–12.5)
RBC # BLD AUTO: 4.64 M/UL (ref 4.6–6.2)
SAMPLE: ABNORMAL
SITE: ABNORMAL
SODIUM SERPL-SCNC: 134 MMOL/L (ref 136–145)
WBC # BLD AUTO: 8.71 K/UL (ref 3.9–12.7)

## 2022-09-05 PROCEDURE — 25000003 PHARM REV CODE 250: Performed by: PHYSICIAN ASSISTANT

## 2022-09-05 PROCEDURE — 99233 PR SUBSEQUENT HOSPITAL CARE,LEVL III: ICD-10-PCS | Mod: ,,, | Performed by: INTERNAL MEDICINE

## 2022-09-05 PROCEDURE — 93750 INTERROGATION VAD IN PERSON: CPT | Mod: ,,, | Performed by: INTERNAL MEDICINE

## 2022-09-05 PROCEDURE — 99233 SBSQ HOSP IP/OBS HIGH 50: CPT | Mod: ,,, | Performed by: PSYCHIATRY & NEUROLOGY

## 2022-09-05 PROCEDURE — 85610 PROTHROMBIN TIME: CPT | Performed by: PHYSICIAN ASSISTANT

## 2022-09-05 PROCEDURE — 99233 SBSQ HOSP IP/OBS HIGH 50: CPT | Mod: ,,, | Performed by: INTERNAL MEDICINE

## 2022-09-05 PROCEDURE — 27000248 HC VAD-ADDITIONAL DAY

## 2022-09-05 PROCEDURE — 86140 C-REACTIVE PROTEIN: CPT | Performed by: PHYSICIAN ASSISTANT

## 2022-09-05 PROCEDURE — 99233 PR SUBSEQUENT HOSPITAL CARE,LEVL III: ICD-10-PCS | Mod: ,,, | Performed by: PSYCHIATRY & NEUROLOGY

## 2022-09-05 PROCEDURE — 85025 COMPLETE CBC W/AUTO DIFF WBC: CPT | Performed by: PHYSICIAN ASSISTANT

## 2022-09-05 PROCEDURE — 20600001 HC STEP DOWN PRIVATE ROOM

## 2022-09-05 PROCEDURE — 85730 THROMBOPLASTIN TIME PARTIAL: CPT | Performed by: PHYSICIAN ASSISTANT

## 2022-09-05 PROCEDURE — 63600175 PHARM REV CODE 636 W HCPCS: Performed by: PHYSICIAN ASSISTANT

## 2022-09-05 PROCEDURE — 83735 ASSAY OF MAGNESIUM: CPT | Performed by: PHYSICIAN ASSISTANT

## 2022-09-05 PROCEDURE — 25000003 PHARM REV CODE 250: Performed by: STUDENT IN AN ORGANIZED HEALTH CARE EDUCATION/TRAINING PROGRAM

## 2022-09-05 PROCEDURE — 84134 ASSAY OF PREALBUMIN: CPT | Performed by: PHYSICIAN ASSISTANT

## 2022-09-05 PROCEDURE — 83615 LACTATE (LD) (LDH) ENZYME: CPT | Performed by: PHYSICIAN ASSISTANT

## 2022-09-05 PROCEDURE — 80048 BASIC METABOLIC PNL TOTAL CA: CPT | Performed by: PHYSICIAN ASSISTANT

## 2022-09-05 PROCEDURE — 93750 PR INTERROGATE VENT ASSIST DEV, IN PERSON, W PHYSICIAN ANALYSIS: ICD-10-PCS | Mod: ,,, | Performed by: INTERNAL MEDICINE

## 2022-09-05 PROCEDURE — 80076 HEPATIC FUNCTION PANEL: CPT | Performed by: PHYSICIAN ASSISTANT

## 2022-09-05 PROCEDURE — 83880 ASSAY OF NATRIURETIC PEPTIDE: CPT | Performed by: PHYSICIAN ASSISTANT

## 2022-09-05 PROCEDURE — 99900035 HC TECH TIME PER 15 MIN (STAT)

## 2022-09-05 RX ORDER — GABAPENTIN 300 MG/1
300 CAPSULE ORAL 3 TIMES DAILY
Status: DISCONTINUED | OUTPATIENT
Start: 2022-09-05 | End: 2022-09-06 | Stop reason: HOSPADM

## 2022-09-05 RX ORDER — INSULIN ASPART 100 [IU]/ML
12 INJECTION, SOLUTION INTRAVENOUS; SUBCUTANEOUS
Status: DISCONTINUED | OUTPATIENT
Start: 2022-09-05 | End: 2022-09-06

## 2022-09-05 RX ORDER — LEVETIRACETAM 500 MG/1
1000 TABLET ORAL 2 TIMES DAILY
Status: DISCONTINUED | OUTPATIENT
Start: 2022-09-06 | End: 2022-09-06 | Stop reason: HOSPADM

## 2022-09-05 RX ADMIN — INSULIN ASPART 10 UNITS: 100 INJECTION, SOLUTION INTRAVENOUS; SUBCUTANEOUS at 12:09

## 2022-09-05 RX ADMIN — INSULIN ASPART 8 UNITS: 100 INJECTION, SOLUTION INTRAVENOUS; SUBCUTANEOUS at 12:09

## 2022-09-05 RX ADMIN — POTASSIUM CHLORIDE 40 MEQ: 1500 TABLET, EXTENDED RELEASE ORAL at 02:09

## 2022-09-05 RX ADMIN — GABAPENTIN 100 MG: 100 CAPSULE ORAL at 10:09

## 2022-09-05 RX ADMIN — SPIRONOLACTONE 25 MG: 25 TABLET, FILM COATED ORAL at 10:09

## 2022-09-05 RX ADMIN — WARFARIN SODIUM 5 MG: 5 TABLET ORAL at 05:09

## 2022-09-05 RX ADMIN — OMEGA-3-ACID ETHYL ESTERS 2 G: 1 CAPSULE, LIQUID FILLED ORAL at 09:09

## 2022-09-05 RX ADMIN — INSULIN ASPART 12 UNITS: 100 INJECTION, SOLUTION INTRAVENOUS; SUBCUTANEOUS at 05:09

## 2022-09-05 RX ADMIN — Medication 800 MG: at 10:09

## 2022-09-05 RX ADMIN — GABAPENTIN 100 MG: 100 CAPSULE ORAL at 02:09

## 2022-09-05 RX ADMIN — INSULIN ASPART 2 UNITS: 100 INJECTION, SOLUTION INTRAVENOUS; SUBCUTANEOUS at 05:09

## 2022-09-05 RX ADMIN — POTASSIUM CHLORIDE 40 MEQ: 1500 TABLET, EXTENDED RELEASE ORAL at 09:09

## 2022-09-05 RX ADMIN — Medication 100 MG: at 09:09

## 2022-09-05 RX ADMIN — FERROUS GLUCONATE 324 MG: 324 TABLET ORAL at 10:09

## 2022-09-05 RX ADMIN — POTASSIUM CHLORIDE 40 MEQ: 1500 TABLET, EXTENDED RELEASE ORAL at 10:09

## 2022-09-05 RX ADMIN — BUSPIRONE HYDROCHLORIDE 7.5 MG: 5 TABLET ORAL at 05:09

## 2022-09-05 RX ADMIN — FAMOTIDINE 40 MG: 20 TABLET ORAL at 10:09

## 2022-09-05 RX ADMIN — FUROSEMIDE 80 MG: 80 TABLET ORAL at 10:09

## 2022-09-05 RX ADMIN — MIRTAZAPINE 15 MG: 15 TABLET, FILM COATED ORAL at 09:09

## 2022-09-05 RX ADMIN — OMEGA-3-ACID ETHYL ESTERS 2 G: 1 CAPSULE, LIQUID FILLED ORAL at 10:09

## 2022-09-05 RX ADMIN — MILRINONE LACTATE IN DEXTROSE 0.25 MCG/KG/MIN: 200 INJECTION, SOLUTION INTRAVENOUS at 09:09

## 2022-09-05 RX ADMIN — GABAPENTIN 300 MG: 300 CAPSULE ORAL at 09:09

## 2022-09-05 RX ADMIN — Medication 800 MG: at 09:09

## 2022-09-05 RX ADMIN — MILRINONE LACTATE IN DEXTROSE 0.25 MCG/KG/MIN: 200 INJECTION, SOLUTION INTRAVENOUS at 05:09

## 2022-09-05 NOTE — RESPIRATORY THERAPY
RAPID RESPONSE RESPIRATORY CHART CHECK       Chart check completed.  Please call [phone] for further concerns or assistance.

## 2022-09-05 NOTE — ASSESSMENT & PLAN NOTE
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  CTH negative for acute intracranial process.    - Neurology consulted to determine etiology of seizures  - No evidence of seizure activity  - Will resume Keppra at 1000mg BID and increase gabapentin to 300mg TID  - DC tramadol, muscle relaxants, all seizure threshold lowering medications  - Device interrogation with [employer] done and per verbal report: only one episode of NSVT on 8/10  - Interrogation of VAD function within normal limits without alarms or low flows.

## 2022-09-05 NOTE — CARE UPDATE
RAPID RESPONSE NURSE ROUND       Rounding completed with CSU Charge RN, Claudette for LVAD pressures  documented, tachycardia. No additional concerns verbalized at this time. Instructed to call [phone] for further concerns or assistance.

## 2022-09-05 NOTE — CARE UPDATE
BG goal 140 -180   Diet [facility]iac [facility]; Fluid - 1500mL     Prandial BG excursions noted. Endocrinology will continue to follow, and manage glycemic control while inpatient.     - Continue Levemir 28 units HS.   - Increase Novolog to 12 units TIDWM. Prandial BG excursions noted.   - Moderate Dose SQ Insulin Correction Scale.  - BG Monitoring AC/HS     ** Please call Endocrine for any BG related issues **  ** Please notify Endocrine for any change and/or advance in diet**    Lab Results   Component Value Date    HGBA1C 7.4 (H) 09/01/2022     Discharge Planning:   TBD. Please notify endocrinology prior to discharge. Patient will need adjustments to home insulin regimen.

## 2022-09-05 NOTE — PLAN OF CARE
POC reviewed with patient and wife at bedside. LVAD WNL. Dressing change will done by wife tonight. Denies pain or SOB. Patienet on milrinone drip no change in rate. Insulin adjusted based on BG and orders.No seizures noted. Questions and concerns addressed. Call light in reach.   Problem: Adult Inpatient Plan of Care  Goal: Plan of Care Review  Outcome: Ongoing, Progressing  Goal: Patient-Specific Goal (Individualized)  Outcome: Ongoing, Progressing  Goal: Absence of Hospital-Acquired Illness or Injury  Outcome: Ongoing, Progressing  Goal: Optimal Comfort and Wellbeing  Outcome: Ongoing, Progressing  Goal: Readiness for Transition of Care  Outcome: Ongoing, Progressing     Problem: Diabetes Comorbidity  Goal: Blood Glucose Level Within Targeted Range  Outcome: Ongoing, Progressing     Problem: Infection  Goal: Absence of Infection Signs and Symptoms  Outcome: Ongoing, Progressing     Problem: Fall Injury Risk  Goal: Absence of Fall and Fall-Related Injury  Outcome: Ongoing, Progressing

## 2022-09-05 NOTE — PROCEDURES
24 hr. Video EEG Monitoring    Date/Time: 9/1/2022 10:18 AM  Performed by: Hunter Herman MD  Authorized by: Jeff Spencer PA-C     EXTENDED  ELECTROENCEPHALOGRAM  REPORT    DATE OF SERVICE: 9/4/22-9/5/22  EEG NUMBER: FH -4,5  REQUESTED BY:  [place]  LOCATION OF SERVICE:  [facility] [place]    METHODOLOGY   Electroencephalographic (EEG) recording is with electrodes placed according to the International 10-20 placement system.  Thirty two (32) channels of digital signal (sampling rate of 512/sec) including T1 and T2 was simultaneously recorded from the scalp and may include  EKG, EMG, and/or eye monitors.  Recording band pass was 0.1 to 512 hz.  Digital video recording of the patient is simultaneously recorded with the EEG.  The patient is instructed report clinical symptoms which may occur during the recording session.  EEG and video recording is stored and archived in digital format.  Activation procedures which include photic stimulation, hyperventilation and instructing patients to perform simple task are done in selected patients.   The EEG is displayed on a monitor screen and can be reviewed using different montages.  Computer assisted analysis is employed to detect spike and electrographic seizure activity.   The entire record is submitted for computer analysis.  The entire recording is visually reviewed and the times identified by computer analysis as being spikes or seizures are reviewed again.  Compresses spectral analysis (CSA) is also performed on the activity recorded from each individual channel.  This is displayed as a power display of frequencies from 0 to 30 Hz over time.   The CSA is reviewed looking for asymmetries in power between homologous areas of the scalp and then compared with the original EEG recording.     [employer] software was also utilized in the review of this study.  This software suite analyzes the EEG recording in multiple domains.  Coherence and rhythmicity is computed to identify EEG  sections which may contain organized seizures.  Each channel undergoes analysis to detect presence of spike and sharp waves which have special and morphological characteristic of epileptic activity.  The routine EEG recording is converted from spacial into frequency domain.  This is then displayed comparing homologous areas to identify areas of significant asymmetry.  Algorithm to identify non-cortically generated artifact is used to separate eye movement, EMG and other artifact from the EEG.      RECORDING TIMES  Start on 9/4/22 at 10:33:32  Stop on 9/5/22 at 07:00:33  Start on 9/5/22 at  07:00:52  Stop on 9/5/22 at 08:27:27    A total of  21 hrs of EEG recording was obtained.    EEG FINDINGS  The record shows a fair  organization at rest, consisting of a 8-9 Hz posterior dominant rhythm with good  reactivity. There is mild bilateral beta activity.     Drowsiness is characterized by attenuation of the background, vertex waves, and bilateral theta slowing. Sleep architecture is not seen.    Provocative maneuvers including hyperventilation and photic stimulation were performed.     EKG recording shows a regular  rhythm.    There is no push button or clinical event.    IMPRESSION:  Study is within normal limits.    Hunter Herman MD

## 2022-09-05 NOTE — PROGRESS NOTES
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 Syncope presented to ED 9/1/22 after episode in parking garage prior to Cards appt today. Patient reports episodes started after VAD placement. Typically gets warning like head feels funny with electrical sensation. Today episode was unwitnessed because his wife was getting a wheelchair when episode occurred. He fell backwards hitting his head on the ground. LOC for a few seconds with brief confusion afterwards, was back at cognitive baseline during Cards appt. LVAD was interrogated and no associated alarms with today's episode. He was advised to present to ED given coumadin and head trauma. CTH without unremarkable for acute intracranial pathology. Per chart review, he has been evaluated by Neuro during previous admissions, started on Keppra 1 g BID after EEG with left TIRDA and imaging with L occipital encephalomalacia. He followed up with Dr. Devi 8/22/22 for recurrent episodes despite Keppra. Longest duration of episode free days since hospital discharge is 4 days. On review of medications, patient is taking tramadol daily for pain. He was advised to continue Keppra and scheduled for EMU admission for event characterization in October. Neurology now consulted 9/1 for seizure evaluation.       Overview/Hospital Course:  No notes on file        Subjective:     Interval History: NAEON      Current Facility-Administered Medications   Medication Dose Route Frequency Provider Last Rate Last Admin    acetaminophen tablet 650 mg  650 mg Oral Q6H PRN DEYA Martinez        albuterol  inhaler 2 puff  2 puff Inhalation Q4H PRN Jeff Spencer PA-C        busPIRone split tablet 7.5 mg  7.5 mg Oral Daily Jeff Spencer PA-C   7.5 mg at 09/05/22 0501    dextrose 10% bolus 125 mL  12.5 g Intravenous PRN Luke Cenac, NP        dextrose 10% bolus 250 mL  25 g Intravenous PRN Luke Cenac, NP        famotidine tablet 40 mg  40 mg Oral Daily Jeff Spencer PA-C   40 mg at 09/04/22 0932    ferrous gluconate tablet 324 mg  324 mg Oral Daily with breakfast Jeff Spencer PA-C   324 mg at 09/04/22 0932    furosemide tablet 80 mg  80 mg Oral Daily Jeff Spencer PA-C   80 mg at 09/04/22 0932    gabapentin capsule 100 mg  100 mg Oral TID Jeff Spencer PA-C   100 mg at 09/04/22 2029    glucagon (human recombinant) injection 1 mg  1 mg Intramuscular PRN Luke Cenac, NP        glucose chewable tablet 16 g  16 g Oral PRN Luke Cenac, NP        glucose chewable tablet 24 g  24 g Oral PRN Luke Cenac, NP        insulin aspart U-100 pen 1-10 Units  1-10 Units Subcutaneous QID (AC + HS) PRN Luke Cenac, NP   6 Units at 09/04/22 1245    insulin aspart U-100 pen 10 Units  10 Units Subcutaneous TIDWM Luke Cenac, NP   10 Units at 09/04/22 1736    insulin detemir U-100 pen 28 Units  28 Units Subcutaneous QHS Luke Cenac, NP   28 Units at 09/04/22 2029    INV ASPIRIN 100MG/PLACEBO 100 mg  100 mg Oral Daily Jeff Spencer PA-C   100 mg at 09/04/22 0900    LIDOcaine 5 % patch 2 patch  2 patch Transdermal Q24H Kaila Reilly MD        lorazepam injection 2 mg  2 mg Intravenous Q5 Min PRN Laura Kern PA-C        magnesium oxide tablet 800 mg  800 mg Oral BID Jeff Spencer PA-C   800 mg at 09/04/22 2029    milrinone 20mg in D5W 100 mL infusion  0.25 mcg/kg/min (Order-Specific) Intravenous Continuous Jeff Spencer PA-C 7 mL/hr at 09/05/22 0552 0.25 mcg/kg/min at 09/05/22 0552    mirtazapine tablet 15 mg  15 mg Oral Nightly Jeff Spencer PA-C   15 mg at 09/04/22 2029    omega-3 acid ethyl esters capsule 2 g  2 g Oral BID Jeff Spencer PA-C    2 g at 09/04/22 2029    potassium chloride SA CR tablet 40 mEq  40 mEq Oral TID Jeff Spencer PA-C   40 mEq at 09/04/22 2029    senna-docusate 8.6-50 mg per tablet 2 tablet  2 tablet Oral Daily Jeff Spencer PA-C        spironolactone tablet 25 mg  25 mg Oral Daily Jeff Spencer PA-C   25 mg at 09/04/22 0932    warfarin (COUMADIN) tablet 5 mg  5 mg Oral Daily Chantal Herndon MD   5 mg at 09/04/22 1619       Review of Systems   Constitutional:  Negative for chills and fever.   HENT:  Negative for sore throat and trouble swallowing.    Eyes:  Negative for photophobia and visual disturbance.   Respiratory:  Negative for cough and shortness of breath.    Cardiovascular:  Negative for chest pain and leg swelling.   Gastrointestinal:  Negative for nausea and vomiting.   Musculoskeletal:  Negative for gait problem and neck stiffness.   Skin:  Negative for color change and pallor.   Neurological:  Positive for numbness. Negative for seizures, syncope, facial asymmetry, speech difficulty and weakness.   Psychiatric/Behavioral:  Positive for sleep disturbance. Negative for hallucinations.    Objective:     Vital Signs (Most Recent):  Temp: 98.1 °F (36.7 °C) (09/05/22 0807)  Pulse: (!) 121 (09/05/22 0807)  Resp: 16 (09/05/22 0807)  BP: (!) 82/0 (09/05/22 0807)  SpO2: 98 % (09/05/22 0807)   Vital Signs (24h Range):  Temp:  [97.5 °F (36.4 °C)-98.7 °F (37.1 °C)] 98.1 °F (36.7 °C)  Pulse:  [] 121  Resp:  [14-18] 16  SpO2:  [97 %-100 %] 98 %  BP: ()/(0-78) 82/0     Weight: 88.2 kg (194 lb 7.1 oz)  Body mass index is 24.3 kg/m².    Physical Exam  Vitals reviewed.   Constitutional:       General: He is not in acute distress.  HENT:      Head: Normocephalic.   Eyes:      Extraocular Movements: Extraocular movements intact.   Cardiovascular:      Rate and Rhythm: Tachycardia present.   Pulmonary:      Effort: Pulmonary effort is normal. No respiratory distress.   Neurological:      Mental Status: He is alert and oriented  to person, place, and time.      Cranial Nerves: Cranial nerves 2-12 are intact.      Coordination: Finger-Nose-Finger Test normal.      Deep Tendon Reflexes:      Reflex Scores:       Tricep reflexes are 2+ on the right side and 2+ on the left side.       Bicep reflexes are 2+ on the right side and 2+ on the left side.       Brachioradialis reflexes are 2+ on the right side and 2+ on the left side.       Patellar reflexes are 2+ on the right side and 2+ on the left side.       Achilles reflexes are 2+ on the right side and 1+ on the left side.  Psychiatric:         Speech: Speech normal.       NEUROLOGICAL EXAMINATION:     MENTAL STATUS   Oriented to person, place, and time.   Attention: normal. Concentration: normal.   Speech: speech is normal   Level of consciousness: alert    CRANIAL NERVES   Cranial nerves II through XII intact.     MOTOR EXAM   Muscle bulk: normal  Overall muscle tone: normal    Strength   Right neck flexion: 5/5  Left neck flexion: 5/5  Right neck extension: 5/5  Left neck extension: 5/5  Right deltoid: 5/5  Left deltoid: 5/5  Right biceps: 5/5  Left biceps: 5/5  Right triceps: 5/5  Left triceps: 5/5  Right wrist flexion: 5/5  Left wrist flexion: 5/5  Right wrist extension: 5/5  Left wrist extension: 5/5  Right interossei: 5/5  Left interossei: 5/5  Right abdominals: 5/5  Left abdominals: 5/5  Right iliopsoas: 5/5  Left iliopsoas: 5/5  Right quadriceps: 5/5  Left quadriceps: 5/5  Right hamstrin/5  Left hamstrin/5  Right glutei: 5/5  Left glutei: 5/5  Right anterior tibial: 5/5  Left anterior tibial: 5/5  Right posterior tibial: 5/5  Left posterior tibial: 5/5  Right peroneal: 5/5  Left peroneal: 5/5  Right gastroc: 5/5  Left gastroc: 5/5    REFLEXES     Reflexes   Right brachioradialis: 2+  Left brachioradialis: 2+  Right biceps: 2+  Left biceps: 2+  Right triceps: 2+  Left triceps: 2+  Right patellar: 2+  Left patellar: 2+  Right achilles: 2+  Left achilles: 1+  Right ankle clonus:  "absent  Left ankle clonus: absent    SENSORY EXAM   Right arm light touch: normal  Left arm light touch: decreased from fingers  Right leg light touch: normal  Left leg light touch: decreased from knee  Sensory deficit distribution on left: median       Paresthesias in L 1-3 digits  Paresthesias in L foot up to ankle  Decreased sensation from L knee to L ankle     GAIT AND COORDINATION      Coordination   Finger to nose coordination: normal    Significant Labs: All pertinent lab results from the past 24 hours have been reviewed.    Significant Imaging: I have reviewed all pertinent imaging results/findings within the past 24 hours.    Assessment and Plan:     * Awareness alteration, transient  37 y.o. male with [facility] s/p LVAD (6/2022) presents after syncope vs. seizure event in parking garage this morning. Pt reports he felt a warning funny sensation in head then lost consciousness, fell backwards hitting his head on the concrete. He proceeded to his scheduled Cards appt and was back at cognitive baseline. LVAD interrogated without associated alarms, settings not adjusted. He was advised to present to ED where CTH without contrast was unremarkable for acute intracranial pathology. He is currently taking Keppra 1 g BID, which was started during LVAD admission for recurrent episodes of loss of awareness with L TIRDA on EEG and encephalomalacia on imaging. Recently seen by Dr. Devi who considered EMU admission in October for event characterization; however, patient unable to be admitted to the EMU service given continuous milrinone infusion.     9/2 further history of episodes obtained. He reports feeling an "electrical sensation" in bifrontal area, which sometimes precedes an event but not always associated with an event. This sensation is worse when trying to concentrate and with eye movement. Prior to an event, he will have weakness and his surroundings appear in slow motion. His significant other reports " increase in events with lack of sleep and increased stress. He has been having these events every other day. He has gone a maximum of 4 days without an event and noted he was not taking his tramadol at that time. No associated postictal state with every event, reports postictal symptoms after 2 events.    -9/5- NAEON, patient with no current complaints    Recommendations:  --Long term EEG monitoring complete at this time, with no electrographic seizures or typical events recorded during stay. Patient ok to discharge from neurology perspective at this time.  --Restart Keppra 1 g BID   --Increase gabapentin to 300 mg TID for neuropathic pain in L hand and foot  --Discontinue tramadol and other medications that lower seizure threshold.   --Discussed with pt/family regarding driving laws in [place] after seizures.  Pt must refrain from driving for 6 months after having a seizure before can legally resume driving.  Physician team not required to report pt to [facility].  Informed pt that I would document this conversation in the medical record.  Additionally, advised pt to avoid bathing alone, working in high places, and to not supervise children swimming alone or engage in other activities where losing consciousness or motor control would risk harm to self or others.  --Follow up with Dr. Devi in clinic OP  --Neurology will sign off at this time. Please call with any additional questions or concerns.         VTE Risk Mitigation (From admission, onward)           Ordered     warfarin (COUMADIN) tablet 5 mg  Daily         09/03/22 1012                    Jonathan Thomson MD  [facility], PGY-I  [facility]

## 2022-09-05 NOTE — SUBJECTIVE & OBJECTIVE
Subjective:     Interval History: NAEON      Current Facility-Administered Medications   Medication Dose Route Frequency Provider Last Rate Last Admin    acetaminophen tablet 650 mg  650 mg Oral Q6H PRN DEYA Martinez        albuterol inhaler 2 puff  2 puff Inhalation Q4H PRN Jeff Spencer PA-C        busPIRone split tablet 7.5 mg  7.5 mg Oral Daily Jeff Spencer PA-C   7.5 mg at 09/05/22 0501    dextrose 10% bolus 125 mL  12.5 g Intravenous PRN Luke Cenac, NP        dextrose 10% bolus 250 mL  25 g Intravenous PRN Luke Cenac, NP        famotidine tablet 40 mg  40 mg Oral Daily Jeff Spencer PA-C   40 mg at 09/04/22 0932    ferrous gluconate tablet 324 mg  324 mg Oral Daily with breakfast Jeff Spencer PA-C   324 mg at 09/04/22 0932    furosemide tablet 80 mg  80 mg Oral Daily Jeff Spencer PA-C   80 mg at 09/04/22 0932    gabapentin capsule 100 mg  100 mg Oral TID Jeff Spencer PA-C   100 mg at 09/04/22 2029    glucagon (human recombinant) injection 1 mg  1 mg Intramuscular PRN Luke Cenac, NP        glucose chewable tablet 16 g  16 g Oral PRN Luke Cenac, NP        glucose chewable tablet 24 g  24 g Oral PRN Luke Cenac, NP        insulin aspart U-100 pen 1-10 Units  1-10 Units Subcutaneous QID (AC + HS) PRN Luke Cenac, NP   6 Units at 09/04/22 1245    insulin aspart U-100 pen 10 Units  10 Units Subcutaneous TIDWM Luke Cenac, NP   10 Units at 09/04/22 1736    insulin detemir U-100 pen 28 Units  28 Units Subcutaneous QHS Luke Cenac, NP   28 Units at 09/04/22 2029    INV ASPIRIN 100MG/PLACEBO 100 mg  100 mg Oral Daily Jeff Spencer PA-C   100 mg at 09/04/22 0900    LIDOcaine 5 % patch 2 patch  2 patch Transdermal Q24H Kaila Reilly MD        lorazepam injection 2 mg  2 mg Intravenous Q5 Min PRN Laura Kern PA-C        magnesium oxide tablet 800 mg  800 mg Oral BID Jeff Spencer PA-C   800 mg at 09/04/22 2029    milrinone 20mg in D5W 100 mL infusion  0.25 mcg/kg/min (Order-Specific) Intravenous Continuous  Jeff Spencer PA-C 7 mL/hr at 09/05/22 0552 0.25 mcg/kg/min at 09/05/22 0552    mirtazapine tablet 15 mg  15 mg Oral Nightly Jeff Spencer PA-C   15 mg at 09/04/22 2029    omega-3 acid ethyl esters capsule 2 g  2 g Oral BID Jeff Spencer PA-C   2 g at 09/04/22 2029    potassium chloride SA CR tablet 40 mEq  40 mEq Oral TID Jeff Spencer PA-C   40 mEq at 09/04/22 2029    senna-docusate 8.6-50 mg per tablet 2 tablet  2 tablet Oral Daily Jeff Spencer PA-C        spironolactone tablet 25 mg  25 mg Oral Daily Jeff Spencer PA-C   25 mg at 09/04/22 0932    warfarin (COUMADIN) tablet 5 mg  5 mg Oral Daily Chantal Herndon MD   5 mg at 09/04/22 1619       Review of Systems   Constitutional:  Negative for chills and fever.   HENT:  Negative for sore throat and trouble swallowing.    Eyes:  Negative for photophobia and visual disturbance.   Respiratory:  Negative for cough and shortness of breath.    Cardiovascular:  Negative for chest pain and leg swelling.   Gastrointestinal:  Negative for nausea and vomiting.   Musculoskeletal:  Negative for gait problem and neck stiffness.   Skin:  Negative for color change and pallor.   Neurological:  Positive for numbness. Negative for seizures, syncope, facial asymmetry, speech difficulty and weakness.   Psychiatric/Behavioral:  Positive for sleep disturbance. Negative for hallucinations.    Objective:     Vital Signs (Most Recent):  Temp: 98.1 °F (36.7 °C) (09/05/22 0807)  Pulse: (!) 121 (09/05/22 0807)  Resp: 16 (09/05/22 0807)  BP: (!) 82/0 (09/05/22 0807)  SpO2: 98 % (09/05/22 0807)   Vital Signs (24h Range):  Temp:  [97.5 °F (36.4 °C)-98.7 °F (37.1 °C)] 98.1 °F (36.7 °C)  Pulse:  [] 121  Resp:  [14-18] 16  SpO2:  [97 %-100 %] 98 %  BP: ()/(0-78) 82/0     Weight: 88.2 kg (194 lb 7.1 oz)  Body mass index is 24.3 kg/m².    Physical Exam  Vitals reviewed.   Constitutional:       General: He is not in acute distress.  HENT:      Head: Normocephalic.   Eyes:       Extraocular Movements: Extraocular movements intact.   Cardiovascular:      Rate and Rhythm: Tachycardia present.   Pulmonary:      Effort: Pulmonary effort is normal. No respiratory distress.   Neurological:      Mental Status: He is alert and oriented to person, place, and time.      Cranial Nerves: Cranial nerves 2-12 are intact.      Coordination: Finger-Nose-Finger Test normal.      Deep Tendon Reflexes:      Reflex Scores:       Tricep reflexes are 2+ on the right side and 2+ on the left side.       Bicep reflexes are 2+ on the right side and 2+ on the left side.       Brachioradialis reflexes are 2+ on the right side and 2+ on the left side.       Patellar reflexes are 2+ on the right side and 2+ on the left side.       Achilles reflexes are 2+ on the right side and 1+ on the left side.  Psychiatric:         Speech: Speech normal.       NEUROLOGICAL EXAMINATION:     MENTAL STATUS   Oriented to person, place, and time.   Attention: normal. Concentration: normal.   Speech: speech is normal   Level of consciousness: alert    CRANIAL NERVES   Cranial nerves II through XII intact.     MOTOR EXAM   Muscle bulk: normal  Overall muscle tone: normal    Strength   Right neck flexion: 5/5  Left neck flexion: 5/5  Right neck extension: 5/5  Left neck extension: 5/5  Right deltoid: 5/5  Left deltoid: 5/5  Right biceps: 5/5  Left biceps: 5/5  Right triceps: 5/5  Left triceps: 5/5  Right wrist flexion: 5/5  Left wrist flexion: 5/5  Right wrist extension: 5/5  Left wrist extension: 5/5  Right interossei: 5/5  Left interossei: 5/5  Right abdominals: 5/5  Left abdominals: 5/5  Right iliopsoas: 5/5  Left iliopsoas: 5/5  Right quadriceps: 5/5  Left quadriceps: 5/5  Right hamstrin/5  Left hamstrin/5  Right glutei: 5/5  Left glutei: 5/5  Right anterior tibial: 5/5  Left anterior tibial: 5/5  Right posterior tibial: 5/5  Left posterior tibial: 5/5  Right peroneal: 5/5  Left peroneal: 5/5  Right gastroc: 5/5  Left gastroc:  5/5    REFLEXES     Reflexes   Right brachioradialis: 2+  Left brachioradialis: 2+  Right biceps: 2+  Left biceps: 2+  Right triceps: 2+  Left triceps: 2+  Right patellar: 2+  Left patellar: 2+  Right achilles: 2+  Left achilles: 1+  Right ankle clonus: absent  Left ankle clonus: absent    SENSORY EXAM   Right arm light touch: normal  Left arm light touch: decreased from fingers  Right leg light touch: normal  Left leg light touch: decreased from knee  Sensory deficit distribution on left: median       Paresthesias in L 1-3 digits  Paresthesias in L foot up to ankle  Decreased sensation from L knee to L ankle     GAIT AND COORDINATION      Coordination   Finger to nose coordination: normal    Significant Labs: All pertinent lab results from the past 24 hours have been reviewed.    Significant Imaging: I have reviewed all pertinent imaging results/findings within the past 24 hours.

## 2022-09-05 NOTE — ASSESSMENT & PLAN NOTE
S/p DT HM3 with MVR plus temporary support with Protek Duo for RV support 6/29 ([place])  On Milrinone at home 0.25  GASTON trial   Procedure: Device Interrogation Including analysis of device parameters  Current Settings: Ventricular Assist Device  Review of device function is stable    TXP LVAD INTERROGATIONS 9/5/2022 9/5/2022 9/5/2022 9/5/2022 9/4/2022 9/4/2022 9/4/2022   Type HeartMate3 HeartMate3 - HeartMate3 HeartMate3 HeartMate3 HeartMate3   Flow 3.5 3.4 3.3 3.7 3.5 3.6 3.6   Speed 5100 5100 5100 5100 5100 5100 5100   PI 8.0 8.7 9.6 6.0 7.5 7.0 8.0   Power (Serna) 3.8 3.9 3.9 3.8 3.8 3.7 3.9   LSL 4700 4700 - - 4700 4700 4700   Low Flow Alarm - - - - - - -   High Power Alarm - - - - - - -   Pulsatility - - - Pulse Pulse - -     - Continue coumadin.  INR subtherapeutic today at 1.9 (goal 2.0-3.0). Previous home dose was 4.5 on Mon, Thurs, and 3mg Qdaily.   - Continue 5mg daily for now. Tomorrow if INR therapeutic will decrease to 3mg.   - LDH stable  - Continue PO Lasix  - RHC 7/21/22  with speed 5100 RA: 14, RV: 41/8 (14), PA: 42/17 (25), PWP: 16/17 (15), CO: 5.32, CI: 2.51.  Was scheduled to have repeat RHC as an outpatient today but was postponed as patient with EEG attached.   -Tentatively plan on RHC this week, possibly Wednesday  - ECHO 9/1 with speed at 5100- EF: 10%, LVEDD: 5.97mc

## 2022-09-05 NOTE — SUBJECTIVE & OBJECTIVE
Interval History: No seizure like activity reported by patient.  He feels well overall.  Denies any SOB, CP, palpitations.  No complaints today.    Continuous Infusions:   milrinone 20mg/100ml D5W (200mcg/ml) 0.25 mcg/kg/min (09/05/22 0552)     Scheduled Meds:   busPIRone  7.5 mg Oral Daily    famotidine  40 mg Oral Daily    ferrous gluconate  324 mg Oral Daily with breakfast    furosemide  80 mg Oral Daily    gabapentin  100 mg Oral TID    insulin aspart U-100  12 Units Subcutaneous TIDWM    insulin detemir U-100  28 Units Subcutaneous QHS    INV ASPIRIN 100MG/PLACEBO  100 mg Oral Daily    LIDOcaine  2 patch Transdermal Q24H    magnesium oxide  800 mg Oral BID    mirtazapine  15 mg Oral Nightly    omega-3 acid ethyl esters  2 g Oral BID    potassium chloride SA  40 mEq Oral TID    senna-docusate 8.6-50 mg  2 tablet Oral Daily    spironolactone  25 mg Oral Daily    warfarin  5 mg Oral Daily     PRN Meds:acetaminophen, albuterol, dextrose 10%, dextrose 10%, glucagon (human recombinant), glucose, glucose, insulin aspart U-100, lorazepam    Review of patient's allergies indicates:   Allergen Reactions    Aspirin Other (See Comments)     Mr. Thacker is enrolled in Dr. Paige's Alon Trial and cannot have any aspirin and/or aspirin-containing products. DO NOT cancel any orders for INV Aspirin 100 mg/Placebo. If you have questions, please contact Isabel @ [phone], [phone],[email or web address], secure chat or MS Teams message.    Bumex [bumetanide] Hives    Lactose Diarrhea     Other reaction(s): Abdominal distension, gaseous    Torsemide Hives     Objective:     Vital Signs (Most Recent):  Temp: 98.1 °F (36.7 °C) (09/05/22 1147)  Pulse: (!) 118 (09/05/22 1147)  Resp: 16 (09/05/22 1147)  BP: (!) 80/0 (09/05/22 1147)  SpO2: 100 % (09/05/22 1147)   Vital Signs (24h Range):  Temp:  [97.5 °F (36.4 °C)-98.7 °F (37.1 °C)] 98.1 °F (36.7 °C)  Pulse:  [] 118  Resp:  [14-18] 16  SpO2:  [97 %-100 %] 100 %  BP:  ()/(0-78) 80/0     Patient Vitals for the past 72 hrs (Last 3 readings):   Weight   09/05/22 0807 88.2 kg (194 lb 7.1 oz)   09/04/22 0800 86.7 kg (191 lb 2.2 oz)   09/03/22 0752 87.8 kg (193 lb 9 oz)     Body mass index is 24.3 kg/m².      Intake/Output Summary (Last 24 hours) at 9/5/2022 1530  Last data filed at 9/5/2022 1434  Gross per 24 hour   Intake 1012 ml   Output 3150 ml   Net -2138 ml       Hemodynamic Parameters:       Telemetry: No acute events overnight on telemetry review    Physical Exam  Vitals reviewed.   Constitutional:       General: He is not in acute distress.  HENT:      Head: Normocephalic.   Eyes:      Extraocular Movements: Extraocular movements intact.   Cardiovascular:      Rate and Rhythm: Tachycardia present.   Pulmonary:      Effort: Pulmonary effort is normal. No respiratory distress.   Neurological:      Mental Status: He is alert and oriented to person, place, and time.      Cranial Nerves: Cranial nerves 2-12 are intact.      Coordination: Finger-Nose-Finger Test normal.      Deep Tendon Reflexes:      Reflex Scores:       Tricep reflexes are 2+ on the right side and 2+ on the left side.       Bicep reflexes are 2+ on the right side and 2+ on the left side.       Brachioradialis reflexes are 2+ on the right side and 2+ on the left side.       Patellar reflexes are 2+ on the right side and 2+ on the left side.       Achilles reflexes are 2+ on the right side and 1+ on the left side.  Psychiatric:         Speech: Speech normal.       Significant Labs:  CBC:  Recent Labs   Lab 09/03/22  0507 09/04/22  0600 09/05/22  0457   WBC 9.30 9.77 8.71   RBC 4.49* 4.60 4.64   HGB 11.4* 11.7* 11.6*   HCT 35.7* 36.6* 36.4*    319 309   MCV 80* 80* 78*   MCH 25.4* 25.4* 25.0*   MCHC 31.9* 32.0 31.9*     BNP:  Recent Labs   Lab 09/01/22  0930 09/02/22  0416 09/05/22  0457   BNP 33 43 90     CMP:  Recent Labs   Lab 09/01/22  1039 09/02/22  0416 09/03/22  0507 09/04/22  0600 09/05/22  0457    * 190* 280* 117* 157*   CALCIUM 9.7 10.1 9.5 9.7 9.7   ALBUMIN 3.8 3.6  --   --  3.4*   PROT 8.9* 8.2  --   --  7.7   * 138 136 134* 134*   K 4.5 4.1 4.2 4.3 4.3   CO2 22* 25 26 24 23    103 103 104 103   BUN 17 17 13 10 11   CREATININE 1.6* 1.3 1.3 1.1 1.1   ALKPHOS 131 117  --   --  112   ALT 40 32  --   --  37   AST 57* 36  --   --  38   BILITOT 0.4 0.5  --   --  0.4      Coagulation:   Recent Labs   Lab 09/03/22 0507 09/04/22 0600 09/05/22  0457   INR 1.6* 1.8* 1.9*   APTT 29.5 30.4 29.4     LDH:  Recent Labs   Lab 09/03/22 0507 09/04/22 0600 09/05/22  0457    220 223     Microbiology:  Microbiology Results (last 7 days)       ** No results found for the last 168 hours. **            I have reviewed all pertinent labs within the past 24 hours.    Estimated Creatinine Clearance: 109.9 mL/min (based on SCr of 1.1 mg/dL).    Diagnostic Results:  I have reviewed all pertinent imaging results/findings within the past 24 hours.

## 2022-09-05 NOTE — PROGRESS NOTES
09/05/2022  John Alaniz    Current provider:  Dalia Crum MD    Device interrogation:  TXP LVAD INTERROGATIONS 9/5/2022 9/5/2022 9/4/2022 9/4/2022 9/4/2022 9/4/2022 9/4/2022   Type - HeartMate3 HeartMate3 HeartMate3 HeartMate3 HeartMate3 HeartMate3   Flow 3.3 3.7 3.5 3.6 3.6 3.4 3.4   Speed 5100 5100 5100 5100 5100 5100 5100   PI 9.6 6.0 7.5 7.0 8.0 8.2 8.3   Power (Serna) 3.9 3.8 3.8 3.7 3.9 3.9 3.8   LSL - - 4700 4700 4700 4700 4700   Low Flow Alarm - - - - - - -   High Power Alarm - - - - - - -   Pulsatility - Pulse Pulse - - - -          Rounded on Kevan Queen to ensure all mechanical assist device settings (IABP or VAD) were appropriate and all parameters were within limits.  I was able to ensure all back up equipment was present, the staff had no issues, and the Perfusion Department daily rounding was complete.      For implantable VADs: Interrogation of Ventricular assist device was performed with analysis of device parameters and review of device function. I have personally reviewed the interrogation findings and agree with findings as stated.     In emergency, the nursing units have been notified to contact the perfusion department either by:  Calling [email or web address] from 630am to 4pm Mon thru Fri, utilizing the On-Call Finder functionality of Epic and searching for Perfusion, or by contacting the hospital  from 4pm to 630am and on weekends and asking to speak with the perfusionist on call.    6:08 AM

## 2022-09-05 NOTE — ASSESSMENT & PLAN NOTE
"37 y.o. male with [facility] s/p LVAD (6/2022) presents after syncope vs. seizure event in parking garage this morning. Pt reports he felt a warning funny sensation in head then lost consciousness, fell backwards hitting his head on the concrete. He proceeded to his scheduled Cards appt and was back at cognitive baseline. LVAD interrogated without associated alarms, settings not adjusted. He was advised to present to ED where CTH without contrast was unremarkable for acute intracranial pathology. He is currently taking Keppra 1 g BID, which was started during LVAD admission for recurrent episodes of loss of awareness with L TIRDA on EEG and encephalomalacia on imaging. Recently seen by Dr. Devi who considered EMU admission in October for event characterization; however, patient unable to be admitted to the EMU service given continuous milrinone infusion.     9/2 further history of episodes obtained. He reports feeling an "electrical sensation" in bifrontal area, which sometimes precedes an event but not always associated with an event. This sensation is worse when trying to concentrate and with eye movement. Prior to an event, he will have weakness and his surroundings appear in slow motion. His significant other reports increase in events with lack of sleep and increased stress. He has been having these events every other day. He has gone a maximum of 4 days without an event and noted he was not taking his tramadol at that time. No associated postictal state with every event, reports postictal symptoms after 2 events.    -9/5- NAEON, patient with no current complaints    Recommendations:  --Long term EEG monitoring complete at this time, with no electrographic seizures or typical events recorded during stay. Patient ok to discharge from neurology perspective at this time.  --Restart Keppra 1 g BID   --Increase gabapentin to 300 mg TID for neuropathic pain in L hand and foot  --Discontinue tramadol and other " medications that lower seizure threshold.   --Discussed with pt/family regarding driving laws in [place] after seizures.  Pt must refrain from driving for 6 months after having a seizure before can legally resume driving.  Physician team not required to report pt to DMV.  Informed pt that I would document this conversation in the medical record.  Additionally, advised pt to avoid bathing alone, working in high places, and to not supervise children swimming alone or engage in other activities where losing consciousness or motor control would risk harm to self or others.  --Follow up with Dr. Devi in clinic OP  --Neurology will sign off at this time. Please call with any additional questions or concerns.

## 2022-09-05 NOTE — PROGRESS NOTES
Called and informed of Pt. blood pressure trend, verified with doppler after cNIPB value. - no new orders at this time.

## 2022-09-05 NOTE — PROGRESS NOTES
[facility] - Cardiology Stepdown  Heart Transplant  Progress Note    Patient Name: Kevan Queen  MRN: [id number]  Admission Date: 9/1/2022  Hospital Length of Stay: 4 days  Attending Physician: Dalia Crum MD  Primary Care Provider: ORALIA Cline  Principal Problem:Awareness alteration, transient    Subjective:     Interval History: No seizure like activity reported by patient.  He feels well overall.  Denies any SOB, CP, palpitations.  No complaints today.    Continuous Infusions:   milrinone 20mg/100ml D5W (200mcg/ml) 0.25 mcg/kg/min (09/05/22 0561)     Scheduled Meds:   busPIRone  7.5 mg Oral Daily    famotidine  40 mg Oral Daily    ferrous gluconate  324 mg Oral Daily with breakfast    furosemide  80 mg Oral Daily    gabapentin  100 mg Oral TID    insulin aspart U-100  12 Units Subcutaneous TIDWM    insulin detemir U-100  28 Units Subcutaneous QHS    INV ASPIRIN 100MG/PLACEBO  100 mg Oral Daily    LIDOcaine  2 patch Transdermal Q24H    magnesium oxide  800 mg Oral BID    mirtazapine  15 mg Oral Nightly    omega-3 acid ethyl esters  2 g Oral BID    potassium chloride SA  40 mEq Oral TID    senna-docusate 8.6-50 mg  2 tablet Oral Daily    spironolactone  25 mg Oral Daily    warfarin  5 mg Oral Daily     PRN Meds:acetaminophen, albuterol, dextrose 10%, dextrose 10%, glucagon (human recombinant), glucose, glucose, insulin aspart U-100, lorazepam    Review of patient's allergies indicates:   Allergen Reactions    Aspirin Other (See Comments)     Mr. Thacker is enrolled in Dr. Paige's Alon Trial and cannot have any aspirin and/or aspirin-containing products. DO NOT cancel any orders for INV Aspirin 100 mg/Placebo. If you have questions, please contact Isabel @ [phone], [phone],[email or web address], secure chat or MS Teams message.    Bumex [bumetanide] Hives    Lactose Diarrhea     Other reaction(s): Abdominal distension, gaseous    Torsemide Hives     Objective:     Vital Signs  (Most Recent):  Temp: 98.1 °F (36.7 °C) (09/05/22 1147)  Pulse: (!) 118 (09/05/22 1147)  Resp: 16 (09/05/22 1147)  BP: (!) 80/0 (09/05/22 1147)  SpO2: 100 % (09/05/22 1147)   Vital Signs (24h Range):  Temp:  [97.5 °F (36.4 °C)-98.7 °F (37.1 °C)] 98.1 °F (36.7 °C)  Pulse:  [] 118  Resp:  [14-18] 16  SpO2:  [97 %-100 %] 100 %  BP: ()/(0-78) 80/0     Patient Vitals for the past 72 hrs (Last 3 readings):   Weight   09/05/22 0807 88.2 kg (194 lb 7.1 oz)   09/04/22 0800 86.7 kg (191 lb 2.2 oz)   09/03/22 0752 87.8 kg (193 lb 9 oz)     Body mass index is 24.3 kg/m².      Intake/Output Summary (Last 24 hours) at 9/5/2022 1530  Last data filed at 9/5/2022 1434  Gross per 24 hour   Intake 1012 ml   Output 3150 ml   Net -2138 ml       Hemodynamic Parameters:       Telemetry: No acute events overnight on telemetry review    Physical Exam  Vitals reviewed.   Constitutional:       General: He is not in acute distress.  HENT:      Head: Normocephalic.   Eyes:      Extraocular Movements: Extraocular movements intact.   Cardiovascular:      Rate and Rhythm: Tachycardia present.   Pulmonary:      Effort: Pulmonary effort is normal. No respiratory distress.   Neurological:      Mental Status: He is alert and oriented to person, place, and time.      Cranial Nerves: Cranial nerves 2-12 are intact.      Coordination: Finger-Nose-Finger Test normal.      Deep Tendon Reflexes:      Reflex Scores:       Tricep reflexes are 2+ on the right side and 2+ on the left side.       Bicep reflexes are 2+ on the right side and 2+ on the left side.       Brachioradialis reflexes are 2+ on the right side and 2+ on the left side.       Patellar reflexes are 2+ on the right side and 2+ on the left side.       Achilles reflexes are 2+ on the right side and 1+ on the left side.  Psychiatric:         Speech: Speech normal.       Significant Labs:  CBC:  Recent Labs   Lab 09/03/22  0507 09/04/22  0600 09/05/22  0457   WBC 9.30 9.77 8.71   RBC  4.49* 4.60 4.64   HGB 11.4* 11.7* 11.6*   HCT 35.7* 36.6* 36.4*    319 309   MCV 80* 80* 78*   MCH 25.4* 25.4* 25.0*   MCHC 31.9* 32.0 31.9*     BNP:  Recent Labs   Lab 09/01/22  0930 09/02/22  0416 09/05/22  0457   BNP 33 43 90     CMP:  Recent Labs   Lab 09/01/22  1039 09/02/22  0416 09/03/22  0507 09/04/22  0600 09/05/22  0457   * 190* 280* 117* 157*   CALCIUM 9.7 10.1 9.5 9.7 9.7   ALBUMIN 3.8 3.6  --   --  3.4*   PROT 8.9* 8.2  --   --  7.7   * 138 136 134* 134*   K 4.5 4.1 4.2 4.3 4.3   CO2 22* 25 26 24 23    103 103 104 103   BUN 17 17 13 10 11   CREATININE 1.6* 1.3 1.3 1.1 1.1   ALKPHOS 131 117  --   --  112   ALT 40 32  --   --  37   AST 57* 36  --   --  38   BILITOT 0.4 0.5  --   --  0.4      Coagulation:   Recent Labs   Lab 09/03/22 0507 09/04/22 0600 09/05/22  0457   INR 1.6* 1.8* 1.9*   APTT 29.5 30.4 29.4     LDH:  Recent Labs   Lab 09/03/22 0507 09/04/22  0600 09/05/22  0457    220 223     Microbiology:  Microbiology Results (last 7 days)       ** No results found for the last 168 hours. **            I have reviewed all pertinent labs within the past 24 hours.    Estimated Creatinine Clearance: 109.9 mL/min (based on SCr of 1.1 mg/dL).    Diagnostic Results:  I have reviewed all pertinent imaging results/findings within the past 24 hours.    Assessment and Plan:     Mr. 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 He also completed a course of IV Abx for staph epi. He was weaned off  but he had to restarted due to RVF. He was eventually transitioned to milrinone (secondary to   "shortage) now on 0.25 mcg/kg/min. He underwent echo this AM and was seen in [facility] clinic today. He did undergo his echo this am however he just informed us that while on his way tot he clinic he had a seizure episode and hit his head. CT scan in the ED negative for intracranial hemorrhage. He reports being lightheaded before losing consciousness and being confused upon waking. He also reports a similar episode earlier this week when he was eating out at restaurant and he was reported by his wfe to "doze off" and not remember and he also was confused upon waking from that episode as well. Denies biting his tongue. VAD speed is at 5100 rpm. No VAD alarms noted on interrogation occasional PI events . BP is 72 mm of Hg. DLES is a "1". INR is therapeutic at 2.05. LDh is at baseline.       * Awareness alteration, transient  Syncopal event vs seizure prior to clinic appt, patient noted to have a history of temporal lobe seizures on Keppra.  CTH negative for acute intracranial process.    - Neurology consulted to determine etiology of seizures  - No evidence of seizure activity  - Will resume Keppra at 1000mg BID and increase gabapentin to 300mg TID  - DC tramadol, muscle relaxants, all seizure threshold lowering medications  - Device interrogation with [employer] done and per verbal report: only one episode of NSVT on 8/10  - Interrogation of VAD function within normal limits without alarms or low flows.     Temporal lobe seizure  - On Keppra 1g BID (holding while inducing seizures)  - Neurology consulted.  See above     LVAD (left ventricular assist device) present  S/p DT HM3 with MVR plus temporary support with Protek Duo for RV support 6/29 (Grecia)  On Milrinone at home 0.25  GASTON trial   Procedure: Device Interrogation Including analysis of device parameters  Current Settings: Ventricular Assist Device  Review of device function is stable    TXP LVAD INTERROGATIONS 9/5/2022 9/5/2022 9/5/2022 9/5/2022 9/4/2022 " 9/4/2022 9/4/2022   Type HeartMate3 HeartMate3 - HeartMate3 HeartMate3 HeartMate3 HeartMate3   Flow 3.5 3.4 3.3 3.7 3.5 3.6 3.6   Speed 5100 5100 5100 5100 5100 5100 5100   PI 8.0 8.7 9.6 6.0 7.5 7.0 8.0   Power (Serna) 3.8 3.9 3.9 3.8 3.8 3.7 3.9   LSL 4700 4700 - - 4700 4700 4700   Low Flow Alarm - - - - - - -   High Power Alarm - - - - - - -   Pulsatility - - - Pulse Pulse - -     - Continue coumadin.  INR subtherapeutic today at 1.9 (goal 2.0-3.0). Previous home dose was 4.5 on Mon, Thurs, and 3mg Qdaily.   - Continue 5mg daily for now. Tomorrow if INR therapeutic will decrease to 3mg.   - LDH stable  - Continue PO Lasix  - RHC 7/21/22  with speed 5100 RA: 14, RV: 41/8 (14), PA: 42/17 (25), PWP: 16/17 (15), CO: 5.32, CI: 2.51.  Was scheduled to have repeat RHC as an outpatient today but was postponed as patient with EEG attached.   -Tentatively plan on RHC this week, possibly Wednesday  - ECHO 9/1 with speed at 5100- EF: 10%, LVEDD: 5.97mc     Type 2 diabetes mellitus with hyperglycemia  - Endocrine consulted and modifying scheduled insulin as needed   - Moderate Dose SQ Insulin Correction Scale.  - BG Monitoring AC/HS     Chronic combined systolic and diastolic congestive heart failure  NICM, suspecting component of familial dilated cardiomyopathy  Last 2D Echo 9/1/22: LVEF 10%, LVEDD 5.97 cm, AV not opening with VAD  On admission CVP: 7, SVO2: 56, CO: 4.28, CI: 2.12, SVR: 1476    - Current diuretic regimen: home dose of Lasix.  Can't take Bumex or Torsemide due to hives  - GDMT with home dose of Aldactone   - Patient was not evaluated for OHTx due to recently quit smoking in April 2022 at the time of workup  - Blood type A +  - 2g Na dietary restriction, 1500 mL fluid restriction, strict I/Os  - Cont milrinone 0.25    Anemia of chronic disease  -H/H stable         Mukul Wilkinson MD  Heart Transplant  [facility] - Cardiology Stepdown

## 2022-09-06 ENCOUNTER — TELEPHONE (OUTPATIENT)
Dept: NEUROLOGY | Facility: CLINIC | Age: 37
End: 2022-09-06
Payer: MEDICAID

## 2022-09-06 VITALS
WEIGHT: 193.13 LBS | BODY MASS INDEX: 24.01 KG/M2 | RESPIRATION RATE: 16 BRPM | DIASTOLIC BLOOD PRESSURE: 79 MMHG | HEART RATE: 93 BPM | OXYGEN SATURATION: 100 % | HEIGHT: 75 IN | SYSTOLIC BLOOD PRESSURE: 111 MMHG | TEMPERATURE: 97 F

## 2022-09-06 LAB
ALLENS TEST: ABNORMAL
ANION GAP SERPL CALC-SCNC: 8 MMOL/L (ref 8–16)
APTT BLDCRRT: 32.8 SEC (ref 21–32)
BASOPHILS # BLD AUTO: 0.04 K/UL (ref 0–0.2)
BASOPHILS NFR BLD: 0.4 % (ref 0–1.9)
BUN SERPL-MCNC: 12 MG/DL (ref 6–20)
CALCIUM SERPL-MCNC: 9.7 MG/DL (ref 8.7–10.5)
CHLORIDE SERPL-SCNC: 103 MMOL/L (ref 95–110)
CO2 SERPL-SCNC: 24 MMOL/L (ref 23–29)
CREAT SERPL-MCNC: 1.2 MG/DL (ref 0.5–1.4)
DELSYS: ABNORMAL
DIFFERENTIAL METHOD: ABNORMAL
EOSINOPHIL # BLD AUTO: 0.2 K/UL (ref 0–0.5)
EOSINOPHIL NFR BLD: 2 % (ref 0–8)
ERYTHROCYTE [DISTWIDTH] IN BLOOD BY AUTOMATED COUNT: 21.7 % (ref 11.5–14.5)
EST. GFR  (NO RACE VARIABLE): >60 ML/MIN/1.73 M^2
GLUCOSE SERPL-MCNC: 176 MG/DL (ref 70–110)
HCO3 UR-SCNC: 28.9 MMOL/L (ref 24–28)
HCT VFR BLD AUTO: 37.1 % (ref 40–54)
HGB BLD-MCNC: 12 G/DL (ref 14–18)
IMM GRANULOCYTES # BLD AUTO: 0.01 K/UL (ref 0–0.04)
IMM GRANULOCYTES NFR BLD AUTO: 0.1 % (ref 0–0.5)
INR PPP: 2.2 (ref 0.8–1.2)
LDH SERPL L TO P-CCNC: 213 U/L (ref 110–260)
LEVETIRACETAM SERPL-MCNC: 30.7 UG/ML (ref 3–60)
LYMPHOCYTES # BLD AUTO: 2.6 K/UL (ref 1–4.8)
LYMPHOCYTES NFR BLD: 29.6 % (ref 18–48)
MAGNESIUM SERPL-MCNC: 2.1 MG/DL (ref 1.6–2.6)
MCH RBC QN AUTO: 25.9 PG (ref 27–31)
MCHC RBC AUTO-ENTMCNC: 32.3 G/DL (ref 32–36)
MCV RBC AUTO: 80 FL (ref 82–98)
MODE: ABNORMAL
MONOCYTES # BLD AUTO: 0.6 K/UL (ref 0.3–1)
MONOCYTES NFR BLD: 7 % (ref 4–15)
NEUTROPHILS # BLD AUTO: 5.4 K/UL (ref 1.8–7.7)
NEUTROPHILS NFR BLD: 60.9 % (ref 38–73)
NRBC BLD-RTO: 0 /100 WBC
PCO2 BLDA: 54.8 MMHG (ref 35–45)
PH SMN: 7.33 [PH] (ref 7.35–7.45)
PLATELET # BLD AUTO: 352 K/UL (ref 150–450)
PMV BLD AUTO: 9.5 FL (ref 9.2–12.9)
PO2 BLDA: 28 MMHG (ref 40–60)
POC BE: 3 MMOL/L
POC SATURATED O2: 47 % (ref 95–100)
POC TCO2: 31 MMOL/L (ref 24–29)
POCT GLUCOSE: 152 MG/DL (ref 70–110)
POCT GLUCOSE: 232 MG/DL (ref 70–110)
POTASSIUM SERPL-SCNC: 4.2 MMOL/L (ref 3.5–5.1)
PROTHROMBIN TIME: 21.5 SEC (ref 9–12.5)
RBC # BLD AUTO: 4.64 M/UL (ref 4.6–6.2)
SAMPLE: ABNORMAL
SITE: ABNORMAL
SODIUM SERPL-SCNC: 135 MMOL/L (ref 136–145)
WBC # BLD AUTO: 8.9 K/UL (ref 3.9–12.7)

## 2022-09-06 PROCEDURE — 85610 PROTHROMBIN TIME: CPT | Performed by: PHYSICIAN ASSISTANT

## 2022-09-06 PROCEDURE — 82803 BLOOD GASES ANY COMBINATION: CPT

## 2022-09-06 PROCEDURE — 83615 LACTATE (LD) (LDH) ENZYME: CPT | Performed by: PHYSICIAN ASSISTANT

## 2022-09-06 PROCEDURE — 99900035 HC TECH TIME PER 15 MIN (STAT)

## 2022-09-06 PROCEDURE — 99233 SBSQ HOSP IP/OBS HIGH 50: CPT | Mod: ,,, | Performed by: PHYSICIAN ASSISTANT

## 2022-09-06 PROCEDURE — 80048 BASIC METABOLIC PNL TOTAL CA: CPT | Performed by: PHYSICIAN ASSISTANT

## 2022-09-06 PROCEDURE — 83735 ASSAY OF MAGNESIUM: CPT | Performed by: PHYSICIAN ASSISTANT

## 2022-09-06 PROCEDURE — 93750 PR INTERROGATE VENT ASSIST DEV, IN PERSON, W PHYSICIAN ANALYSIS: ICD-10-PCS | Mod: ,,, | Performed by: INTERNAL MEDICINE

## 2022-09-06 PROCEDURE — 93750 INTERROGATION VAD IN PERSON: CPT | Mod: ,,, | Performed by: INTERNAL MEDICINE

## 2022-09-06 PROCEDURE — 25000003 PHARM REV CODE 250: Performed by: STUDENT IN AN ORGANIZED HEALTH CARE EDUCATION/TRAINING PROGRAM

## 2022-09-06 PROCEDURE — 99232 PR SUBSEQUENT HOSPITAL CARE,LEVL II: ICD-10-PCS | Mod: ,,, | Performed by: NURSE PRACTITIONER

## 2022-09-06 PROCEDURE — 63600175 PHARM REV CODE 636 W HCPCS: Performed by: PHYSICIAN ASSISTANT

## 2022-09-06 PROCEDURE — 85025 COMPLETE CBC W/AUTO DIFF WBC: CPT | Performed by: INTERNAL MEDICINE

## 2022-09-06 PROCEDURE — 85730 THROMBOPLASTIN TIME PARTIAL: CPT | Performed by: PHYSICIAN ASSISTANT

## 2022-09-06 PROCEDURE — 27000248 HC VAD-ADDITIONAL DAY

## 2022-09-06 PROCEDURE — 99232 SBSQ HOSP IP/OBS MODERATE 35: CPT | Mod: ,,, | Performed by: NURSE PRACTITIONER

## 2022-09-06 PROCEDURE — 25000003 PHARM REV CODE 250: Performed by: PHYSICIAN ASSISTANT

## 2022-09-06 PROCEDURE — 94761 N-INVAS EAR/PLS OXIMETRY MLT: CPT

## 2022-09-06 PROCEDURE — 99233 PR SUBSEQUENT HOSPITAL CARE,LEVL III: ICD-10-PCS | Mod: ,,, | Performed by: PHYSICIAN ASSISTANT

## 2022-09-06 RX ORDER — METHOCARBAMOL 500 MG/1
500 TABLET, FILM COATED ORAL 4 TIMES DAILY PRN
Qty: 30 TABLET | Refills: 2 | Status: ON HOLD | OUTPATIENT
Start: 2022-09-06 | End: 2022-12-16 | Stop reason: HOSPADM

## 2022-09-06 RX ORDER — INSULIN ASPART 100 [IU]/ML
14 INJECTION, SOLUTION INTRAVENOUS; SUBCUTANEOUS
Status: DISCONTINUED | OUTPATIENT
Start: 2022-09-06 | End: 2022-09-06 | Stop reason: HOSPADM

## 2022-09-06 RX ORDER — INSULIN ASPART 100 [IU]/ML
14 INJECTION, SOLUTION INTRAVENOUS; SUBCUTANEOUS 3 TIMES DAILY
Qty: 30 ML | Refills: 3 | Status: ON HOLD
Start: 2022-09-06 | End: 2023-04-06 | Stop reason: HOSPADM

## 2022-09-06 RX ORDER — WARFARIN 3 MG/1
TABLET ORAL
Qty: 30 TABLET | Refills: 11 | Status: ON HOLD | OUTPATIENT
Start: 2022-09-06 | End: 2022-12-16 | Stop reason: SDUPTHER

## 2022-09-06 RX ORDER — GABAPENTIN 300 MG/1
300 CAPSULE ORAL 3 TIMES DAILY
Qty: 90 CAPSULE | Refills: 11 | Status: ON HOLD | OUTPATIENT
Start: 2022-09-06 | End: 2022-12-16 | Stop reason: HOSPADM

## 2022-09-06 RX ADMIN — FAMOTIDINE 40 MG: 20 TABLET ORAL at 08:09

## 2022-09-06 RX ADMIN — GABAPENTIN 300 MG: 300 CAPSULE ORAL at 03:09

## 2022-09-06 RX ADMIN — BUSPIRONE HYDROCHLORIDE 7.5 MG: 5 TABLET ORAL at 05:09

## 2022-09-06 RX ADMIN — INSULIN ASPART 4 UNITS: 100 INJECTION, SOLUTION INTRAVENOUS; SUBCUTANEOUS at 08:09

## 2022-09-06 RX ADMIN — OMEGA-3-ACID ETHYL ESTERS 2 G: 1 CAPSULE, LIQUID FILLED ORAL at 08:09

## 2022-09-06 RX ADMIN — Medication 100 MG: at 08:09

## 2022-09-06 RX ADMIN — GABAPENTIN 300 MG: 300 CAPSULE ORAL at 08:09

## 2022-09-06 RX ADMIN — FERROUS GLUCONATE 324 MG: 324 TABLET ORAL at 08:09

## 2022-09-06 RX ADMIN — POTASSIUM CHLORIDE 40 MEQ: 1500 TABLET, EXTENDED RELEASE ORAL at 03:09

## 2022-09-06 RX ADMIN — POTASSIUM CHLORIDE 40 MEQ: 1500 TABLET, EXTENDED RELEASE ORAL at 08:09

## 2022-09-06 RX ADMIN — INSULIN ASPART 12 UNITS: 100 INJECTION, SOLUTION INTRAVENOUS; SUBCUTANEOUS at 08:09

## 2022-09-06 RX ADMIN — SPIRONOLACTONE 25 MG: 25 TABLET, FILM COATED ORAL at 08:09

## 2022-09-06 RX ADMIN — FUROSEMIDE 80 MG: 80 TABLET ORAL at 08:09

## 2022-09-06 RX ADMIN — INSULIN ASPART 12 UNITS: 100 INJECTION, SOLUTION INTRAVENOUS; SUBCUTANEOUS at 01:09

## 2022-09-06 RX ADMIN — WARFARIN SODIUM 5 MG: 5 TABLET ORAL at 04:09

## 2022-09-06 RX ADMIN — LEVETIRACETAM 1000 MG: 500 TABLET, FILM COATED ORAL at 08:09

## 2022-09-06 RX ADMIN — MILRINONE LACTATE IN DEXTROSE 0.25 MCG/KG/MIN: 200 INJECTION, SOLUTION INTRAVENOUS at 12:09

## 2022-09-06 RX ADMIN — Medication 800 MG: at 08:09

## 2022-09-06 NOTE — ASSESSMENT & PLAN NOTE
BG goal 140 -180     - Increase Levemir to 36 units HS. FBG above goal this AM. FBG above goal.   - Increase Novolog to 14 units TIDWM. CGM sample provided to patient. Dietary teaching reinforced.   - Moderate Dose SQ Insulin Correction Scale.  - BG Monitoring AC/HS     ** Please call Endocrine for any BG related issues **  ** Please notify Endocrine for any change and/or advance in diet**  Lab Results   Component Value Date    HGBA1C 7.4 (H) 09/01/2022       Discharge Planning:   - Insurance preferred diabetes testing supplies. Recommend FreeStyle William 2 CGM. Sample given at bedside. Patient has diabetes mellitus and manages diabetes with intensive insulin regimen with three or more insulin injections daily or CSII.  Patient requires a therapeutic CGM and is willing to use therapeutic CGM for the necessary frequent adjustments of insulin therapy. Patient has been using SMBG for frequent glucose monitoring (4x/day). I have completed an in-person visit during the previous 6 months and will continue to have in-person visits every 6 months to assess adherence to their CGM regimen and diabetes treatment plan.    - Increase home Levemir to 36 units HS.   - Increase home meal time Novolog to 14 units TIDWM in addition to the following correction scale:     150 - 200 + 1 unit     201 - 250 + 2 units     251 - 300 + 3 units     301 - 350 + 4 units      > 350   + 5 units  - Patient is to keep blood sugar logs, monitor BG at least 4 times daily, and follow-up with PCP for continued DM management and care.

## 2022-09-06 NOTE — PROGRESS NOTES
09/06/2022  Miracle Caballero    Current provider:  Dalia Crum MD    Device interrogation:  TXP LVAD INTERROGATIONS 9/6/2022 9/6/2022 9/6/2022 9/6/2022 9/5/2022 9/5/2022 9/5/2022   Type - HeartMate3 - - HeartMate3 - HeartMate3   Flow 3.43 3.3 3.4 3.5 3.6 3.5 3.5   Speed 5100 5100 5100 5100 5100 5100 5100   PI 8.3 8.0 7.1 7.0 6.9 6.7 8.0   Power (Serna) 3.9 3.8 3.9 3.8 3.8 3.8 3.8   LSL 4700 4700 4700 4700 4700 4700 4700   Low Flow Alarm - - no no no - -   High Power Alarm - - no no no - -   Pulsatility - - - - - - -          Rounded on Kevan Queen to ensure all mechanical assist device settings (IABP or VAD) were appropriate and all parameters were within limits.  I was able to ensure all back up equipment was present, the staff had no issues, and the Perfusion Department daily rounding was complete.      For implantable VADs: Interrogation of Ventricular assist device was performed with analysis of device parameters and review of device function. I have personally reviewed the interrogation findings and agree with findings as stated.     In emergency, the nursing units have been notified to contact the perfusion department either by:  Calling [phone] from 630am to 4pm Mon thru Fri, utilizing the On-Call Finder functionality of Epic and searching for Perfusion, or by contacting the hospital  from 4pm to 630am and on weekends and asking to speak with the perfusionist on call.    11:58 AM

## 2022-09-06 NOTE — SUBJECTIVE & OBJECTIVE
Interval History: patient reports no new complaints this morning.  He is eager to go home.  Long term EEG monitoring complete at this time, with no electrographic seizures or typical events recorded during stay.  Plan per Neurology is to continue Keppra 1 g BID and Gabapentin 300mg TID.  Will not restart Tramadol.  Will arrange for RHC as an outpatient during hospital follow up.    Continuous Infusions:   milrinone 20mg/100ml D5W (200mcg/ml) 0.25 mcg/kg/min (09/06/22 0600)     Scheduled Meds:   busPIRone  7.5 mg Oral Daily    famotidine  40 mg Oral Daily    ferrous gluconate  324 mg Oral Daily with breakfast    furosemide  80 mg Oral Daily    gabapentin  300 mg Oral TID    insulin aspart U-100  14 Units Subcutaneous TIDWM    insulin detemir U-100  36 Units Subcutaneous QHS    INV ASPIRIN 100MG/PLACEBO  100 mg Oral Daily    levETIRAcetam  1,000 mg Oral BID    LIDOcaine  2 patch Transdermal Q24H    magnesium oxide  800 mg Oral BID    mirtazapine  15 mg Oral Nightly    omega-3 acid ethyl esters  2 g Oral BID    potassium chloride SA  40 mEq Oral TID    senna-docusate 8.6-50 mg  2 tablet Oral Daily    spironolactone  25 mg Oral Daily    warfarin  5 mg Oral Daily     PRN Meds:acetaminophen, albuterol, dextrose 10%, dextrose 10%, glucagon (human recombinant), glucose, glucose, insulin aspart U-100, lorazepam    Review of patient's allergies indicates:   Allergen Reactions    Aspirin Other (See Comments)     Mr. Thacker is enrolled in Dr. Paige's Alon Trial and cannot have any aspirin and/or aspirin-containing products. DO NOT cancel any orders for INV Aspirin 100 mg/Placebo. If you have questions, please contact Isabel @ [phone], [phone],[email or web address], secure chat or MS Teams message.    Bumex [bumetanide] Hives    Lactose Diarrhea     Other reaction(s): Abdominal distension, gaseous    Torsemide Hives     Objective:     Vital Signs (Most Recent):  Temp: 98.1 °F (36.7 °C) (09/06/22 1125)  Pulse: 72  (09/06/22 1125)  Resp: 16 (09/06/22 1125)  BP: (!) 98/0 (09/06/22 1147)  SpO2: 99 % (09/06/22 1125) Vital Signs (24h Range):  Temp:  [97.9 °F (36.6 °C)-98.4 °F (36.9 °C)] 98.1 °F (36.7 °C)  Pulse:  [] 72  Resp:  [15-20] 16  SpO2:  [98 %-100 %] 99 %  BP: ()/(0-77) 98/0     Patient Vitals for the past 72 hrs (Last 3 readings):   Weight   09/06/22 0800 87.6 kg (193 lb 2 oz)   09/05/22 0807 88.2 kg (194 lb 7.1 oz)   09/04/22 0800 86.7 kg (191 lb 2.2 oz)       Body mass index is 24.14 kg/m².      Intake/Output Summary (Last 24 hours) at 9/6/2022 1207  Last data filed at 9/6/2022 1144  Gross per 24 hour   Intake 774.58 ml   Output 2000 ml   Net -1225.42 ml         Hemodynamic Parameters:       Telemetry: reviewed  Physical Exam  Constitutional:       Appearance: Normal appearance.   HENT:      Head: Normocephalic and atraumatic.   Eyes:      Extraocular Movements: Extraocular movements intact.      Pupils: Pupils are equal, round, and reactive to light.   Neck:      Comments: No JVD elevation  Cardiovascular:      Rate and Rhythm: Normal rate and regular rhythm.      Pulses: Normal pulses.      Heart sounds: Normal heart sounds. No murmur heard.     Comments: Smooth VAD hum    Pulmonary:      Effort: Pulmonary effort is normal.      Breath sounds: Normal breath sounds.   Abdominal:      General: Abdomen is flat. There is no distension.      Comments: DLES intact    Musculoskeletal:         General: Normal range of motion.      Cervical back: Neck supple.   Skin:     General: Skin is warm.      Capillary Refill: Capillary refill takes 2 to 3 seconds.   Neurological:      General: No focal deficit present.      Mental Status: He is alert and oriented to person, place, and time.           Significant Labs:  CBC:  Recent Labs   Lab 09/04/22  0600 09/05/22  0457 09/06/22  0919   WBC 9.77 8.71 8.90   RBC 4.60 4.64 4.64   HGB 11.7* 11.6* 12.0*   HCT 36.6* 36.4* 37.1*    309 352   MCV 80* 78* 80*   MCH 25.4*  25.0* 25.9*   MCHC 32.0 31.9* 32.3       BNP:  Recent Labs   Lab 09/01/22  0930 09/02/22  0416 09/05/22  0457   BNP 33 43 90       CMP:  Recent Labs   Lab 09/01/22  1039 09/02/22  0416 09/03/22  0507 09/04/22  0600 09/05/22  0457 09/06/22  0538   * 190*   < > 117* 157* 176*   CALCIUM 9.7 10.1   < > 9.7 9.7 9.7   ALBUMIN 3.8 3.6  --   --  3.4*  --    PROT 8.9* 8.2  --   --  7.7  --    * 138   < > 134* 134* 135*   K 4.5 4.1   < > 4.3 4.3 4.2   CO2 22* 25   < > 24 23 24    103   < > 104 103 103   BUN 17 17   < > 10 11 12   CREATININE 1.6* 1.3   < > 1.1 1.1 1.2   ALKPHOS 131 117  --   --  112  --    ALT 40 32  --   --  37  --    AST 57* 36  --   --  38  --    BILITOT 0.4 0.5  --   --  0.4  --     < > = values in this interval not displayed.        Coagulation:   Recent Labs   Lab 09/04/22 0600 09/05/22 0457 09/06/22  0538   INR 1.8* 1.9* 2.2*   APTT 30.4 29.4 32.8*       LDH:  Recent Labs   Lab 09/04/22  0600 09/05/22 0457 09/06/22  0538    223 213       Microbiology:  Microbiology Results (last 7 days)       ** No results found for the last 168 hours. **            I have reviewed all pertinent labs within the past 24 hours.    Estimated Creatinine Clearance: 100.7 mL/min (based on SCr of 1.2 mg/dL).    Diagnostic Results:  I have reviewed all pertinent imaging results/findings within the past 24 hours.

## 2022-09-06 NOTE — ASSESSMENT & PLAN NOTE
-S/p DT HM3 with MVR plus temporary support with Protek Duo for RV support 6/29 (Grecia) On Milrinone at home 0.25  -GASTON trial   - Continue coumadin.  INR therapeutic today at 2.2 (goal 2.0-3.0). Previous home dose was 4.5 on Mon, Thurs, and 3mg Qdaily.   - LDH stable  - Continue PO Lasix  - RHC 7/21/22  with speed 5100 RA: 14, RV: 41/8 (14), PA: 42/17 (25), PWP: 16/17 (15), CO: 5.32, CI: 2.51. Will arrange for RHC as an outpatient during hospital follow up  - ECHO 9/1 with speed at 5100- EF: 10%, LVEDD: 5.97mc     Procedure: Device Interrogation Including analysis of device parameters  Current Settings: Ventricular Assist Device  Review of device function is stable    TXP LVAD INTERROGATIONS 9/6/2022 9/6/2022 9/6/2022 9/6/2022 9/5/2022 9/5/2022 9/5/2022   Type - HeartMate3 - - HeartMate3 - HeartMate3   Flow 3.43 3.3 3.4 3.5 3.6 3.5 3.5   Speed 5100 5100 5100 5100 5100 5100 5100   PI 8.3 8.0 7.1 7.0 6.9 6.7 8.0   Power (Serna) 3.9 3.8 3.9 3.8 3.8 3.8 3.8   LSL 4700 4700 4700 4700 4700 4700 4700   Low Flow Alarm - - no no no - -   High Power Alarm - - no no no - -   Pulsatility - - - - - - -

## 2022-09-06 NOTE — ASSESSMENT & PLAN NOTE
-Syncopal event vs seizure prior to clinic appt, patient noted to have a history of temporal lobe seizures on -Keppra.  -CTH negative for acute intracranial process.  - Neurology consulted to determine etiology of seizures  - No evidence of seizure activity  - Resumed Keppra at 1000mg BID and increase gabapentin to 300mg TID  - DC tramadol, all seizure threshold lowering medications  - Device interrogation with [facility] done and per verbal report: only one episode of NSVT on 8/10  - Interrogation of VAD function within normal limits without alarms or low flows.

## 2022-09-06 NOTE — TELEPHONE ENCOUNTER
I have been informed that LVAD patients cannot admit to the EMU and can only be admitted under HTS as primary on the 3rd floor. I have messaged Dr. Norris and have called patient's spouse and left a message. I have also mailed a letter explaining this via [employer] and sent a message in the portal. I have called [phone], canceled via [employer].

## 2022-09-06 NOTE — ASSESSMENT & PLAN NOTE
-NICM, suspecting component of familial dilated cardiomyopathy  -Last 2D Echo 9/1/22: LVEF 10%, LVEDD 5.97 cm, AV not opening with VAD  -On admission CVP: 7, SVO2: 56, CO: 4.28, CI: 2.12, SVR: 1476  - Current diuretic regimen: home dose of Lasix.  Can't take Bumex or Torsemide due to hives  - GDMT with home dose of Aldactone   - Patient was not evaluated for OHTx due to recently quit smoking in April 2022 at the time of workup  - Blood type A +  - 2g Na dietary restriction, 1500 mL fluid restriction, strict I/Os  - Cont milrinone 0.25

## 2022-09-06 NOTE — HOSPITAL COURSE
37 y.o. male with [facility] s/p LVAD (6/2022) presents after syncope vs. seizure event in parking garage morning of admit. LVAD interrogated without associated alarms, settings not adjusted. He was advised to present to ED where CTH without contrast was unremarkable for acute intracranial pathology. On admit, he was taking Keppra 1 g BID, which was started during LVAD admission for recurrent episodes of loss of awareness with L TIRDA on EEG and encephalomalacia on imaging. Recently seen by Dr. Devi who considered EMU admission in October for event characterization; however, patient unable to be admitted to the EMU service given continuous milrinone infusion. Neurology was conaulted and Long term EEG monitoring complete at this time, with no electrographic seizures or typical events recorded during stay. Per Neurolgoy ok to restart Keppra 1 g BID and increase gabapentin to 300 mg TID for neuropathic pain in L hand and foot.  We discontinue tramadol and other medications that lower seizure threshold. We added methocarbamol for pain control on top of Tylenol.  Neurology discussed with pt/family regarding driving laws in [place] after seizures.  Pt must refrain from driving for 6 months after having a seizure before can legally resume driving.  Physician team not required to report pt to [facility].  Additionally, advised pt to avoid bathing alone, working in high places, and to not supervise children swimming alone or engage in other activities where losing consciousness or motor control would risk harm to self or others.  Plan to follow up with Neurology and possible re admit for long term EEG monitoring again.    He was scheduled to have RHC on day of admit.  Throughout his hospital stay; he remained euvolemic.  Will plan for RHC as an outpatient.  He was discharged on previous Milrinone dose.

## 2022-09-06 NOTE — PROGRESS NOTES
Discharge    Pt presents in room with SO Niharika and two minor children. Pt aaox4 with pleasant affect. Pt voices agreement with plan to discharge to home today with [facility] phone [phone], fax [phone]. Pt goes to their [facility] for labs and dressing changes in lieu of home health due to pt has Medicaid. Pt's SO will transport pt. Pt reports coping well and denies further needs, questions, concerns at this time and none indicated.Providing psychosocial and counseling support, education, resources, assistance and discharge planning as indicated. SW remains available.

## 2022-09-06 NOTE — PROGRESS NOTES
"[facility]  Endocrinology  Progress Note    Admit Date: 9/1/2022     Reason for Consult: Management of T2DM, Hyperglycemia     Surgical Procedure and Date: LVAD 06/29/2022    Diabetes diagnosis year: 2022    Home Diabetes Medications:    Levemir 22 units HS.   Novolog 10 units TIDWM.     How often checking glucose at home? 1-3 x day   BG readings on regimen: 100's - 200s  Hypoglycemia on the regimen?  No  Missed doses on regimen?  No    Diabetes Complications include:     Hyperglycemia     Complicating diabetes co morbidities:   History of MI, CHF, and CKD      HPI:   Patient is a 37 y.o. 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 He also completed a course of IV Abx for staph epi. He was weaned off  but he had to restarted due to RVF. He was eventually transitioned to milrinone (secondary to  shortage) now on 0.25 mcg/kg/min. He underwent echo this AM and was seen in [facility] clinic today. He did undergo his echo this am however he just informed us that while on his way tot  clinic he had a seizure episode and hit his head. CT scan in the ED negative for intracranial hemorrhage. He reports being lightheaded before losing consciousness and being confused upon waking. He also reports a similar episode earlier this week when he was eating out at restaurant and he was reported by his wfe to "doze off" and not remember and he also was confused upon waking from that episode as well. Denies biting his tongue. VAD speed is at 5100 rpm. No VAD alarms noted on interrogation occasional PI events . BP is 72 mm of Hg. DLES is a "1". INR " "is therapeutic at 2.05. LDh is at baseline. Endocrinology consulted on 2022 to manage glycemic control.     Lab Results   Component Value Date    HGBA1C 9.2 (H) 2022           Interval HPI:   Overnight events:   BG now more stable while on current SQ insulin regimen. Prandial BG excursions still remain. Patient is scheduled for discharge today. Endocrinology will continue to follow, provide discharge recommendations, and manage glycemic control while inpatient.   - Diet [facility]; Fluid - 1500mL       Eatin%  Nausea: No  Hypoglycemia and intervention: No  Fever: No  TPN and/or TF: No  If yes, type of TF/TPN and rate: None    BP (!) 98/0 (BP Location: Left arm, Patient Position: Lying)   Pulse 72   Temp 98.1 °F (36.7 °C) (Oral)   Resp 16   Ht 6' 3" (1.905 m)   Wt 87.6 kg (193 lb 2 oz)   SpO2 99%   BMI 24.14 kg/m²     Labs Reviewed and Include    Recent Labs   Lab 22  0538   *   CALCIUM 9.7   *   K 4.2   CO2 24      BUN 12   CREATININE 1.2     Lab Results   Component Value Date    WBC 8.90 2022    HGB 12.0 (L) 2022    HCT 37.1 (L) 2022    MCV 80 (L) 2022     2022     No results for input(s): TSH, FREET4 in the last 168 hours.  Lab Results   Component Value Date    HGBA1C 7.4 (H) 2022       Nutritional status:   Body mass index is 24.14 kg/m².  Lab Results   Component Value Date    ALBUMIN 3.4 (L) 2022    ALBUMIN 3.6 2022    ALBUMIN 3.8 2022     Lab Results   Component Value Date    PREALBUMIN 30 2022    PREALBUMIN 32 2022    PREALBUMIN 33 2022       Estimated Creatinine Clearance: 100.7 mL/min (based on SCr of 1.2 mg/dL).    Accu-Checks  Recent Labs     22  0929 22  1205 22  1729 22  1946 22  0804 22  1210 22  1606 22  2105 22  0741 22  1143   POCTGLUCOSE 226* 253* 138* 127* 202* 328* 168* 120* 232* 152* "       Current Medications and/or Treatments Impacting Glycemic Control  Immunotherapy:    Immunosuppressants       None          Steroids:   Hormones (From admission, onward)      None          Pressors:    Autonomic Drugs (From admission, onward)      None          Hyperglycemia/Diabetes Medications:   Antihyperglycemics (From admission, onward)      Start     Stop Route Frequency Ordered    09/05/22 1645  insulin aspart U-100 pen 12 Units         -- SubQ 3 times daily with meals 09/05/22 1414    09/03/22 2100  insulin detemir U-100 pen 28 Units         -- SubQ Nightly 09/03/22 1536    09/01/22 1844  insulin aspart U-100 pen 1-10 Units         -- SubQ Before meals & nightly PRN 09/01/22 1745            ASSESSMENT and PLAN    * Awareness alteration, transient  Managed per primary team  Avoid hypoglycemia        Type 2 diabetes mellitus with hyperglycemia  BG goal 140 -180     - Increase Levemir to 36 units HS. FBG above goal this AM. FBG above goal.   - Increase Novolog to 14 units TIDWM. CGM sample provided to patient. Dietary teaching reinforced.   - Moderate Dose SQ Insulin Correction Scale.  - BG Monitoring AC/HS     ** Please call Endocrine for any BG related issues **  ** Please notify Endocrine for any change and/or advance in diet**  Lab Results   Component Value Date    HGBA1C 7.4 (H) 09/01/2022       Discharge Planning:   - Insurance preferred diabetes testing supplies. Recommend FreeStyle William 2 CGM. Sample given at bedside. Patient has diabetes mellitus and manages diabetes with intensive insulin regimen with three or more insulin injections daily or CSII.  Patient requires a therapeutic CGM and is willing to use therapeutic CGM for the necessary frequent adjustments of insulin therapy. Patient has been using SMBG for frequent glucose monitoring (4x/day). I have completed an in-person visit during the previous 6 months and will continue to have in-person visits every 6 months to assess adherence to  their CGM regimen and diabetes treatment plan.    - Increase home Levemir to 36 units HS.   - Increase home meal time Novolog to 14 units TIDWM in addition to the following correction scale:     150 - 200 + 1 unit     201 - 250 + 2 units     251 - 300 + 3 units     301 - 350 + 4 units      > 350   + 5 units  - Patient is to keep blood sugar logs, monitor BG at least 4 times daily, and follow-up with PCP for continued DM management and care.           Anemia of chronic disease  May affect accuracy of HbA1c results.               Chris Gomes, NP  Endocrinology  [facility]

## 2022-09-06 NOTE — PLAN OF CARE
Pt. With no flow alarms throughout shift. HTS team called and informed of MAP at 2000 and 0000 vital signs- no orders placed and asked by team to be updated with 0400 vitals. Vitals WNL @ 0400.  Spouse at bedside throughout shift.     Problem: Adult Inpatient Plan of Care  Goal: Plan of Care Review  Outcome: Ongoing, Progressing  Goal: Patient-Specific Goal (Individualized)  Outcome: Ongoing, Progressing  Goal: Absence of Hospital-Acquired Illness or Injury  Outcome: Ongoing, Progressing  Goal: Optimal Comfort and Wellbeing  Outcome: Ongoing, Progressing  Goal: Readiness for Transition of Care  Outcome: Ongoing, Progressing     Problem: Diabetes Comorbidity  Goal: Blood Glucose Level Within Targeted Range  Outcome: Ongoing, Progressing     Problem: Infection  Goal: Absence of Infection Signs and Symptoms  Outcome: Ongoing, Progressing     Problem: Fall Injury Risk  Goal: Absence of Fall and Fall-Related Injury  Outcome: Ongoing, Progressing

## 2022-09-06 NOTE — PROGRESS NOTES
[facility]  Heart Transplant  Progress Note    Patient Name: Kevan Queen  MRN: [id number]  Admission Date: 9/1/2022  Hospital Length of Stay: 5 days  Attending Physician: Dalia Crum MD  Primary Care Provider: ORALIA Cline  Principal Problem:Awareness alteration, transient    Subjective:     Interval History: patient reports no new complaints this morning.  He is eager to go home.  Long term EEG monitoring complete at this time, with no electrographic seizures or typical events recorded during stay.  Plan per Neurology is to continue Keppra 1 g BID and Gabapentin 300mg TID.  Will not restart Tramadol.  Will arrange for RHC as an outpatient during hospital follow up.    Continuous Infusions:   milrinone 20mg/100ml D5W (200mcg/ml) 0.25 mcg/kg/min (09/06/22 0600)     Scheduled Meds:   busPIRone  7.5 mg Oral Daily    famotidine  40 mg Oral Daily    ferrous gluconate  324 mg Oral Daily with breakfast    furosemide  80 mg Oral Daily    gabapentin  300 mg Oral TID    insulin aspart U-100  14 Units Subcutaneous TIDWM    insulin detemir U-100  36 Units Subcutaneous QHS    INV ASPIRIN 100MG/PLACEBO  100 mg Oral Daily    levETIRAcetam  1,000 mg Oral BID    LIDOcaine  2 patch Transdermal Q24H    magnesium oxide  800 mg Oral BID    mirtazapine  15 mg Oral Nightly    omega-3 acid ethyl esters  2 g Oral BID    potassium chloride SA  40 mEq Oral TID    senna-docusate 8.6-50 mg  2 tablet Oral Daily    spironolactone  25 mg Oral Daily    warfarin  5 mg Oral Daily     PRN Meds:acetaminophen, albuterol, dextrose 10%, dextrose 10%, glucagon (human recombinant), glucose, glucose, insulin aspart U-100, lorazepam    Review of patient's allergies indicates:   Allergen Reactions    Aspirin Other (See Comments)     Mr. Thacker is enrolled in Dr. Paige's Alon Trial and cannot have any aspirin and/or aspirin-containing products. DO NOT cancel any orders for INV Aspirin 100 mg/Placebo. If you  have questions, please contact Isabel @ [phone], [phone],[email or web address], secure chat or MS Teams message.    Bumex [bumetanide] Hives    Lactose Diarrhea     Other reaction(s): Abdominal distension, gaseous    Torsemide Hives     Objective:     Vital Signs (Most Recent):  Temp: 98.1 °F (36.7 °C) (09/06/22 1125)  Pulse: 72 (09/06/22 1125)  Resp: 16 (09/06/22 1125)  BP: (!) 98/0 (09/06/22 1147)  SpO2: 99 % (09/06/22 1125) Vital Signs (24h Range):  Temp:  [97.9 °F (36.6 °C)-98.4 °F (36.9 °C)] 98.1 °F (36.7 °C)  Pulse:  [] 72  Resp:  [15-20] 16  SpO2:  [98 %-100 %] 99 %  BP: ()/(0-77) 98/0     Patient Vitals for the past 72 hrs (Last 3 readings):   Weight   09/06/22 0800 87.6 kg (193 lb 2 oz)   09/05/22 0807 88.2 kg (194 lb 7.1 oz)   09/04/22 0800 86.7 kg (191 lb 2.2 oz)       Body mass index is 24.14 kg/m².      Intake/Output Summary (Last 24 hours) at 9/6/2022 1207  Last data filed at 9/6/2022 1144  Gross per 24 hour   Intake 774.58 ml   Output 2000 ml   Net -1225.42 ml         Hemodynamic Parameters:       Telemetry: reviewed  Physical Exam  Constitutional:       Appearance: Normal appearance.   HENT:      Head: Normocephalic and atraumatic.   Eyes:      Extraocular Movements: Extraocular movements intact.      Pupils: Pupils are equal, round, and reactive to light.   Neck:      Comments: No JVD elevation  Cardiovascular:      Rate and Rhythm: Normal rate and regular rhythm.      Pulses: Normal pulses.      Heart sounds: Normal heart sounds. No murmur heard.     Comments: Smooth VAD hum    Pulmonary:      Effort: Pulmonary effort is normal.      Breath sounds: Normal breath sounds.   Abdominal:      General: Abdomen is flat. There is no distension.      Comments: DLES intact    Musculoskeletal:         General: Normal range of motion.      Cervical back: Neck supple.   Skin:     General: Skin is warm.      Capillary Refill: Capillary refill takes 2 to 3 seconds.   Neurological:       General: No focal deficit present.      Mental Status: He is alert and oriented to person, place, and time.           Significant Labs:  CBC:  Recent Labs   Lab 09/04/22  0600 09/05/22 0457 09/06/22  0919   WBC 9.77 8.71 8.90   RBC 4.60 4.64 4.64   HGB 11.7* 11.6* 12.0*   HCT 36.6* 36.4* 37.1*    309 352   MCV 80* 78* 80*   MCH 25.4* 25.0* 25.9*   MCHC 32.0 31.9* 32.3       BNP:  Recent Labs   Lab 09/01/22  0930 09/02/22 0416 09/05/22 0457   BNP 33 43 90       CMP:  Recent Labs   Lab 09/01/22  1039 09/02/22 0416 09/03/22  0507 09/04/22 0600 09/05/22 0457 09/06/22  0538   * 190*   < > 117* 157* 176*   CALCIUM 9.7 10.1   < > 9.7 9.7 9.7   ALBUMIN 3.8 3.6  --   --  3.4*  --    PROT 8.9* 8.2  --   --  7.7  --    * 138   < > 134* 134* 135*   K 4.5 4.1   < > 4.3 4.3 4.2   CO2 22* 25   < > 24 23 24    103   < > 104 103 103   BUN 17 17   < > 10 11 12   CREATININE 1.6* 1.3   < > 1.1 1.1 1.2   ALKPHOS 131 117  --   --  112  --    ALT 40 32  --   --  37  --    AST 57* 36  --   --  38  --    BILITOT 0.4 0.5  --   --  0.4  --     < > = values in this interval not displayed.        Coagulation:   Recent Labs   Lab 09/04/22 0600 09/05/22 0457 09/06/22  0538   INR 1.8* 1.9* 2.2*   APTT 30.4 29.4 32.8*       LDH:  Recent Labs   Lab 09/04/22 0600 09/05/22 0457 09/06/22  0538    223 213       Microbiology:  Microbiology Results (last 7 days)       ** No results found for the last 168 hours. **            I have reviewed all pertinent labs within the past 24 hours.    Estimated Creatinine Clearance: 100.7 mL/min (based on SCr of 1.2 mg/dL).    Diagnostic Results:  I have reviewed all pertinent imaging results/findings within the past 24 hours.    Assessment and Plan:     Mr. 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 He also completed a course of IV Abx for staph epi. He was weaned off  but he had to restarted due to RVF. He was eventually transitioned to milrinone (secondary to  shortage) now on 0.25 mcg/kg/min. He underwent echo this AM and was seen in [facility] clinic today. He did undergo his echo this am however he just informed us that while on his way tot  clinic he had a seizure episode and hit his head. CT scan in the ED negative for intracranial hemorrhage. He reports being lightheaded before losing consciousness and being confused upon waking. He also reports a similar episode earlier this week when he was eating out at restaurant and he was reported by his wfe to "doze off" and not remember and he also was confused upon waking from that episode as well. Denies biting his tongue. VAD speed is at 5100 rpm. No VAD alarms noted on interrogation occasional PI events . BP is 72 mm of Hg. DLES is a "1". INR is therapeutic at 2.05. LDh is at baseline.       * Awareness alteration, transient  -Syncopal event vs seizure prior to clinic appt, patient noted to have a history of temporal lobe seizures on -Keppra.  -CTH negative for acute intracranial process.  - Neurology consulted to determine etiology of seizures  - No evidence of seizure activity  - Resumed Keppra at 1000mg BID and increase gabapentin to 300mg TID  - DC tramadol, all seizure threshold lowering medications  - Device interrogation with [employer] done and per verbal report: only one episode of NSVT on 8/10  - Interrogation of VAD function within normal limits without alarms or low flows.     LVAD (left ventricular assist device) present  -S/p DT HM3 with MVR plus temporary support with Protek Duo for RV support 6/29 ([place]) On Milrinone at home " 0.25  -GASTON trial   - Continue coumadin.  INR therapeutic today at 2.2 (goal 2.0-3.0). Previous home dose was 4.5 on Mon, Thurs, and 3mg Qdaily.   - LDH stable  - Continue PO Lasix  - RHC 7/21/22  with speed 5100 RA: 14, RV: 41/8 (14), PA: 42/17 (25), PWP: 16/17 (15), CO: 5.32, CI: 2.51. Will arrange for RHC as an outpatient during hospital follow up  - ECHO 9/1 with speed at 5100- EF: 10%, LVEDD: 5.97mc     Procedure: Device Interrogation Including analysis of device parameters  Current Settings: Ventricular Assist Device  Review of device function is stable    TXP LVAD INTERROGATIONS 9/6/2022 9/6/2022 9/6/2022 9/6/2022 9/5/2022 9/5/2022 9/5/2022   Type - HeartMate3 - - HeartMate3 - HeartMate3   Flow 3.43 3.3 3.4 3.5 3.6 3.5 3.5   Speed 5100 5100 5100 5100 5100 5100 5100   PI 8.3 8.0 7.1 7.0 6.9 6.7 8.0   Power (Serna) 3.9 3.8 3.9 3.8 3.8 3.8 3.8   LSL 4700 4700 4700 4700 4700 4700 4700   Low Flow Alarm - - no no no - -   High Power Alarm - - no no no - -   Pulsatility - - - - - - -         Chronic combined systolic and diastolic congestive heart failure  -NICM, suspecting component of familial dilated cardiomyopathy  -Last 2D Echo 9/1/22: LVEF 10%, LVEDD 5.97 cm, AV not opening with VAD  -On admission CVP: 7, SVO2: 56, CO: 4.28, CI: 2.12, SVR: 1476  - Current diuretic regimen: home dose of Lasix.  Can't take Bumex or Torsemide due to hives  - GDMT with home dose of Aldactone   - Patient was not evaluated for OHTx due to recently quit smoking in April 2022 at the time of workup  - Blood type A +  - 2g Na dietary restriction, 1500 mL fluid restriction, strict I/Os  - Cont milrinone 0.25    Temporal lobe seizure  - On Keppra 1g BID (holding while inducing seizures)  - Neurology consulted.  See above     Type 2 diabetes mellitus with hyperglycemia  - Endocrine consulted and modifying scheduled insulin as needed   - Moderate Dose SQ Insulin Correction Scale.  - BG Monitoring AC/HS     Anemia of chronic disease  -H/H  stable         DEYA Martinez  Heart Transplant  [facility]

## 2022-09-06 NOTE — DISCHARGE SUMMARY
"[facility] - Cardiology Stepdown  Heart Transplant  Discharge Summary      Patient Name: Kevan Queen  MRN: [id number]  Admission Date: 9/1/2022  Hospital Length of Stay: 5 days  Discharge Date and Time: 09/06/2022 4:22 PM  Attending Physician: Dalia Crum MD   Discharging Provider: DEYA Martinez  Primary Care Provider: ORALIA Cline     HPI: Mr. 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 He also completed a course of IV Abx for staph epi. He was weaned off  but he had to restarted due to RVF. He was eventually transitioned to milrinone (secondary to  shortage) now on 0.25 mcg/kg/min. He underwent echo this AM and was seen [facility] today. He did undergo his echo this am however he just informed us that while on his way tot  clinic he had a seizure episode and hit his head. CT scan in the ED negative for intracranial hemorrhage. He reports being lightheaded before losing consciousness and being confused upon waking. He also reports a similar episode earlier this week when he was eating out at restaurant and he was reported by his wfe to "doze off" and not remember and he also was confused upon waking from that episode as well. Denies biting his tongue. VAD speed is at 5100 rpm. No VAD alarms noted on interrogation occasional PI events . BP is 72 mm of Hg. DLES is a "1". INR is therapeutic at 2.05. LDh is at baseline.       Procedure(s) (LRB):  INSERTION, CATHETER, RIGHT HEART (Right)     Hospital Course: 37 y.o. male with NIDCM s/p LVAD (6/2022) presents after syncope vs. seizure event in parking garage morning of " admit. LVAD interrogated without associated alarms, settings not adjusted. He was advised to present to ED where CTH without contrast was unremarkable for acute intracranial pathology. On admit, he was taking Keppra 1 g BID, which was started during LVAD admission for recurrent episodes of loss of awareness with L TIRDA on EEG and encephalomalacia on imaging. Recently seen by Dr. Devi who considered EMU admission in October for event characterization; however, patient unable to be admitted to the EMU service given continuous milrinone infusion. Neurology was conaulted and Long term EEG monitoring complete at this time, with no electrographic seizures or typical events recorded during stay. Per Neurolgoy ok to restart Keppra 1 g BID and increase gabapentin to 300 mg TID for neuropathic pain in L hand and foot.  We discontinue tramadol and other medications that lower seizure threshold. We added methocarbamol for pain control on top of Tylenol.  Neurology discussed with pt/family regarding driving laws in [place] after seizures.  Pt must refrain from driving for 6 months after having a seizure before can legally resume driving.  Physician team not required to report pt to DM.  Additionally, advised pt to avoid bathing alone, working in high places, and to not supervise children swimming alone or engage in other activities where losing consciousness or motor control would risk harm to self or others.  Plan to follow up with Neurology and possible re admit for long term EEG monitoring again.    He was scheduled to have RHC on day of admit.  Throughout his hospital stay; he remained euvolemic.  Will plan for RHC as an outpatient.  He was discharged on previous Milrinone dose.         Goals of Care Treatment Preferences:  Code Status: Full Code    Health care agent: Malou Dumont  [facility] agent number: [phone]                   Consults (From admission, onward)        Status Ordering Provider     Inpatient  consult to Registered Dietitian/Nutritionist  Once        Provider:  (Not yet assigned)    Completed TERESA ARGUELLES     Inpatient consult to Endocrinology  Once        Provider:  (Not yet assigned)    Completed TERESA ARGUELLES     Inpatient consult to Neurology  Once        Provider:  (Not yet assigned)    Completed TERESA ARGUELLES     Inpatient consult to Cardiology  Once        Provider:  (Not yet assigned)    Completed LILLIE SHIPMAN          Significant Diagnostic Studies: See chart for full details     Pending Diagnostic Studies:     None        Final Active Diagnoses:    Diagnosis Date Noted POA    PRINCIPAL PROBLEM:  Awareness alteration, transient [R40.4] 09/01/2022 Yes    LVAD (left ventricular assist device) present [Z95.811] 06/30/2022 Not Applicable    Chronic combined systolic and diastolic congestive heart failure [I50.42] 11/05/2020 Yes    Temporal lobe seizure [G40.109] 07/20/2022 Yes    Type 2 diabetes mellitus with hyperglycemia [E11.65] 05/25/2022 Unknown    Anemia of chronic disease [D63.8] 10/26/2020 Yes      Problems Resolved During this Admission:      Discharged Condition: stable    Disposition: Home or Self Care    Follow Up:    Patient Instructions:      Diet Cardiac     Notify your health care provider if you experience any of the following:  redness, tenderness, or signs of infection (pain, swelling, redness, odor or green/yellow discharge around incision site)     Notify your health care provider if you experience any of the following:  persistent dizziness, light-headedness, or visual disturbances     Activity as tolerated     Medications:  Reconciled Home Medications:      Medication List      START taking these medications    methocarbamoL 500 MG Tab  Commonly known as: ROBAXIN  Take 1 tablet (500 mg total) by mouth 4 (four) times daily as needed (muscle pain).        CHANGE how you take these medications    gabapentin 300 MG capsule  Commonly known as: NEURONTIN  Take 1 capsule  (300 mg total) by mouth 3 (three) times daily.  What changed:   · medication strength  · how much to take     insulin aspart U-100 100 unit/mL (3 mL) Inpn pen  Commonly known as: NovoLOG  Inject 14 Units into the skin 3 (three) times daily.  What changed: how much to take     insulin detemir U-100 100 unit/mL (3 mL) Inpn pen  Commonly known as: Levemir FLEXTOUCH  Inject 36 Units into the skin every evening.  What changed:   · how much to take  · when to take this     warfarin 3 MG tablet  Commonly known as: COUMADIN  Take 4.5 mg orally daily on Mondays and Thursdays. Take 3mg orally daily on other days.  What changed:   · how much to take  · how to take this  · when to take this  · additional instructions        CONTINUE taking these medications    albuterol 90 mcg/actuation inhaler  Commonly known as: PROVENTIL/VENTOLIN HFA  INHALE 2 PUFFS BY MOUTH EVERY 4 HOURS AS NEEDED FOR COUGH OR WHEEZING     busPIRone 7.5 MG tablet  Commonly known as: BUSPAR  Take 1 tablet (7.5 mg total) by mouth once daily at 6am.     famotidine 40 MG tablet  Commonly known as: PEPCID  Take 1 tablet (40 mg total) by mouth once daily.     ferrous gluconate 324 mg (37.5 mg iron) Tab tablet  Take 1 tablet (324 mg total) by mouth daily with breakfast.     furosemide 80 MG tablet  Commonly known as: LASIX  Take 1 tablet (80 mg total) by mouth once daily.     INV ASPIRIN 100MG/PLACEBO  Take 1 capsul (100 ) mg by mouth once daily. FOR INVESTIGATIONAL USE ONLY. Protocol: GASTON III subject: [id number]     levETIRAcetam 1000 MG tablet  Commonly known as: KEPPRA  Take 1 tablet (1,000 mg total) by mouth 2 (two) times daily.     magnesium oxide 400 mg (241.3 mg magnesium) tablet  Commonly known as: MAG-OX  Take 2 tablets (800 mg total) by mouth 2 (two) times daily.     milrinone 20mg/100ml D5W (200mcg/ml) 20 mg/100 mL (200 mcg/mL) infusion  Commonly known as: PRIMACOR  Inject 34.875 mcg/min into the vein continuous.     mirtazapine 15 MG  "tablet  Commonly known as: REMERON  Take 15 mg by mouth nightly.     omega-3 acid ethyl esters 1 gram capsule  Commonly known as: LOVAZA  Take 2 capsules (2 g total) by mouth 2 (two) times daily.     pen needle, diabetic 31 gauge x 1/4" Ndle  1 Device by Misc.(Non-Drug; Combo Route) route 4 (four) times daily.     potassium chloride SA 20 MEQ tablet  Commonly known as: K-DUR,KLOR-CON  Take 2 tablets (40 mEq total) by mouth 3 (three) times daily.     senna-docusate 8.6-50 mg 8.6-50 mg per tablet  Commonly known as: SENNA-S  Take 2 tablets by mouth once daily.     spironolactone 25 MG tablet  Commonly known as: ALDACTONE  Take 1 tablet (25 mg total) by mouth once daily.     TRUE METRIX GLUCOSE METER Misc  Generic drug: blood-glucose meter  Use as instructed     TRUE METRIX GLUCOSE TEST STRIP Strp  Generic drug: blood sugar diagnostic  Use to test blood glucose 4 (four) times daily.     TRUEPLUS LANCETS 33 gauge Misc  Generic drug: lancets  Use to test blood glucose 4 (four) times daily.        STOP taking these medications    digoxin 125 mcg tablet  Commonly known as: LANOXIN     EScitalopram oxalate 5 MG Tab  Commonly known as: LEXAPRO     LANTUS SOLOSTAR U-100 INSULIN glargine 100 units/mL SubQ pen  Generic drug: insulin     metOLazone 2.5 MG tablet  Commonly known as: ZAROXOLYN     metoprolol succinate 25 MG 24 hr tablet  Commonly known as: TOPROL-XL     milrinone 1 mg/mL injection  Commonly known as: PRIMACOR     pantoprazole 40 MG tablet  Commonly known as: PROTONIX     tiZANidine 2 MG tablet  Commonly known as: ZANAFLEX     traMADoL 50 mg tablet  Commonly known as: DEYA Millan  Heart Transplant  [facility] - Cardiology Stepdown  "

## 2022-09-06 NOTE — PROGRESS NOTES
Pt AAO while sitting at edge of bed with spouse and children at bedside.  LVAD history interrogated with occasional PI events w/ speed drops noted.  LVAD parametes WNL. Pt denies any needs at this time.Plan for d/c home today w/ close f/u in VAD clinic and [facility] as outpatient. Pt verbalizes understanding.  Encouraged pt to notify nurse if they have any questions, problems or concerns for LVAD coordinator.  Pt verbalized understanding and in agreement of plan. Will follow up with pt soon.

## 2022-09-06 NOTE — NURSING
Patient is ready for discharge. Patient stable alert and oriented. PICC in place with home milrinone infusing. LVAD equipment reconciliation done. No complaints of pain. Discussed discharge plan. Reviewed medications and side effects, appointments, and answered questions with patient and spouse. Verbalized understanding, no further questions.

## 2022-09-06 NOTE — PLAN OF CARE
[facility]   Heart Transplant/VAD Clinic   [address] [place] [address]   [phone] after hours          [phone] fax     VAD HOME  HEALTH ORDERS      Admit to Home Health    Diagnosis:   Patient Active Problem List   Diagnosis    Anxiety disorder, unspecified    Anemia of chronic disease    Ecstasy abuse    Cannabis use disorder, severe, dependence    Chronic combined systolic and diastolic congestive heart failure    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    Type 2 diabetes mellitus with hyperglycemia    Insomnia    Left shoulder pain    SOB (shortness of breath)    Palliative care encounter    Goals of care, counseling/discussion    Advanced care planning/counseling discussion    Familial dilated cardiomyopathy    LVAD (left ventricular assist device) present    Tingling in extremities    Seizure-like activity    Temporal lobe seizure    Examination of participant in clinical trial    Awareness alteration, transient       Patient is homebound due to:  NYHA Class IV HF. S/P LVAD placement.     Diet: Low Fat, Low cholesterol, 2Gm Na, Coumadin restrictions.    Acitivities: No Swimming, bathing, vacuuming, contact sports.    Fresh implants= Sternal Precautions    Nursing:   SN to complete comprehensive assessment including routine vital signs. Instruct on disease process and s/s of complications to report to MD. Review/verify medication list sent home with the patient at time of discharge  and instruct patient/caregiver as needed. Frequency may be adjusted depending on start of care date.    **LVAD driveline exit site dressing change is to be completed per LVAD patient/caregiver only**.    Notify MD if:  SBP > 120 or < 80;   MAP > 80 or < 65;   HR > 120 or < 60;   Temp > 101;   Weight gain >3lbs in 1 day or 5lbs in 1 week.    LABS:  SN to perform labs: PT/INR per Coumadin clinic [phone].   Follow up INR date: Thurs 9/8/22  No Finger Sticks  Weekly  CBC, BMP, BNP    HOME INFUSION THERAPY:   SN to perform Infusion Therapy/Central Line Care.  Review Central Line Care & Central Line Flush with patient.    Administer (drug and dose): Milrinone 0.25 mcg/kg/min x 93kg, intravenous, continuous     Central line care:  Scrub the Hub: Prior to accessing the line, always perform a 30 second alcohol scrub  Each lumen of the central line is to be flushed at least daily with 10 mL Normal Saline and 3 mL Heparin flush (100 units/mL)  Skilled Nurse (SN) may draw blood from IV access  Blood Draw Procedure:   - Aspirate at least 5 mL of blood   - Discard   - Obtain specimen   - Change posiflow cap   - Flush with 20 mL Normal Saline followed by a                 3-5 mL Heparin flush (100 units/mL)  Central :   - Sterile dressing changes are done weekly and as needed.   - Use chlor-hexadine scrub to cleanse site, apply Biopatch to insertion site,       apply securement device dressing   - Posi-flow caps are changed weekly and after EVERY lab draw.   - If sterile gauze is under dressing to control oozing,                 dressing change must be performed every 24 hours until gauze is not needed.    CONSULTS:      Aide to provide assistance with personal care, ADLs, and vital signs    Send initial Home Health orders to HTS attending physician on call.  Send follow up questions to VAD clinic MD [phone] or [phone].

## 2022-09-06 NOTE — PROGRESS NOTES
DISCHARGE NOTE:    Kevan Queen is a 37 y.o. male s/p HM3 lvad from 6.29.22 admitted for evaluation of syncope.     Past Medical History:   Diagnosis Date    Arthritis     Cardiomyopathy     CHF (congestive heart failure) 10/01/2020    Diabetes mellitus     Dilated cardiomyopathy 10/26/2020    Drug abuse 10/2020    Hyperosmolar hyperglycemic state (HHS) 5/25/2022    ICD (implantable cardioverter-defibrillator) in place 10/26/2020    Muscle cramping 6/15/2022    Renal disorder        Hospital Course: During his hospital stay Mr. Queen was evaluated by neurology for seizures. He was continued on keppra and gabapentin was increased. Previous tramadol was discontinued. His inr today is 2.2 while being administered warfarin 5mg orally daily. Plan to resume home warfarin dose at 4.5mg M/Thur; 3mg other days.     Pharmacy Interventions/Recommendations:  1) INR Goal: 2-3    2) Antiplatelet Agents: INV    3) Heparin Bridging:  UFH/ LMWH    4) INR Follow-Up/Discharge Needs:  Thursday with coumadin clinic     See list of discharge medication for dosing instructions.     Kevan Queen and his caregiver verbalized their understanding and had the opportunity to ask questions.      Discharge Medications:     Medication List        START taking these medications      methocarbamoL 500 MG Tab  Commonly known as: ROBAXIN  Take 1 tablet (500 mg total) by mouth 4 (four) times daily as needed (muscle pain).            CHANGE how you take these medications      gabapentin 300 MG capsule  Commonly known as: NEURONTIN  Take 1 capsule (300 mg total) by mouth 3 (three) times daily.  What changed:   medication strength  how much to take     insulin aspart U-100 100 unit/mL (3 mL) Inpn pen  Commonly known as: NovoLOG  Inject 14 Units into the skin 3 (three) times daily.  What changed: how much to take     insulin detemir U-100 100 unit/mL (3 mL) Inpn pen  Commonly known as: Levemir FLEXTOUCH  Inject 36 Units into the skin every  "evening.  What changed:   how much to take  when to take this     warfarin 3 MG tablet  Commonly known as: COUMADIN  Take 4.5 mg orally daily on Mondays and Thursdays. Take 3mg orally daily on other days.  What changed:   how much to take  how to take this  when to take this  additional instructions            CONTINUE taking these medications      albuterol 90 mcg/actuation inhaler  Commonly known as: PROVENTIL/VENTOLIN HFA     busPIRone 7.5 MG tablet  Commonly known as: BUSPAR  Take 1 tablet (7.5 mg total) by mouth once daily at 6am.     famotidine 40 MG tablet  Commonly known as: PEPCID  Take 1 tablet (40 mg total) by mouth once daily.     ferrous gluconate 324 mg (37.5 mg iron) Tab tablet  Take 1 tablet (324 mg total) by mouth daily with breakfast.     furosemide 80 MG tablet  Commonly known as: LASIX  Take 1 tablet (80 mg total) by mouth once daily.     INV ASPIRIN 100MG/PLACEBO  Take 1 capsul (100 ) mg by mouth once daily. FOR INVESTIGATIONAL USE ONLY. Protocol: GASTON III subject: [id number]     levETIRAcetam 1000 MG tablet  Commonly known as: KEPPRA  Take 1 tablet (1,000 mg total) by mouth 2 (two) times daily.     magnesium oxide 400 mg (241.3 mg magnesium) tablet  Commonly known as: MAG-OX  Take 2 tablets (800 mg total) by mouth 2 (two) times daily.     milrinone 20mg/100ml D5W (200mcg/ml) 20 mg/100 mL (200 mcg/mL) infusion  Commonly known as: PRIMACOR  Inject 34.875 mcg/min into the vein continuous.     mirtazapine 15 MG tablet  Commonly known as: REMERON     omega-3 acid ethyl esters 1 gram capsule  Commonly known as: LOVAZA  Take 2 capsules (2 g total) by mouth 2 (two) times daily.     pen needle, diabetic 31 gauge x 1/4" Ndle  1 Device by Misc.(Non-Drug; Combo Route) route 4 (four) times daily.     potassium chloride SA 20 MEQ tablet  Commonly known as: K-DUR,KLOR-CON  Take 2 tablets (40 mEq total) by mouth 3 (three) times daily.     senna-docusate 8.6-50 mg 8.6-50 mg per tablet  Commonly known as: " SENNA-S  Take 2 tablets by mouth once daily.     spironolactone 25 MG tablet  Commonly known as: ALDACTONE  Take 1 tablet (25 mg total) by mouth once daily.     TRUE METRIX GLUCOSE METER Misc  Generic drug: blood-glucose meter  Use as instructed     TRUE METRIX GLUCOSE TEST STRIP Strp  Generic drug: blood sugar diagnostic  Use to test blood glucose 4 (four) times daily.     TRUEPLUS LANCETS 33 gauge Misc  Generic drug: lancets  Use to test blood glucose 4 (four) times daily.            STOP taking these medications      digoxin 125 mcg tablet  Commonly known as: LANOXIN     EScitalopram oxalate 5 MG Tab  Commonly known as: LEXAPRO     LANTUS SOLOSTAR U-100 INSULIN glargine 100 units/mL SubQ pen  Generic drug: insulin     metOLazone 2.5 MG tablet  Commonly known as: ZAROXOLYN     metoprolol succinate 25 MG 24 hr tablet  Commonly known as: TOPROL-XL     milrinone 1 mg/mL injection  Commonly known as: PRIMACOR     pantoprazole 40 MG tablet  Commonly known as: PROTONIX     tiZANidine 2 MG tablet  Commonly known as: ZANAFLEX     traMADoL 50 mg tablet  Commonly known as: ULTRAM               Where to Get Your Medications        These medications were sent to [id number] [address] & [address] [address]      Hours: 24-hours Phone: [phone]   gabapentin 300 MG capsule  insulin detemir U-100 100 unit/mL (3 mL) Inpn pen  methocarbamoL 500 MG Tab  warfarin 3 MG tablet       Information about where to get these medications is not yet available    Ask your nurse or doctor about these medications  insulin aspart U-100 100 unit/mL (3 mL) Inpn pen

## 2022-09-06 NOTE — SUBJECTIVE & OBJECTIVE
"Interval HPI:   Overnight events:   BG now more stable while on current SQ insulin regimen. Prandial BG excursions still remain. Patient is scheduled for discharge today. Endocrinology will continue to follow, provide discharge recommendations, and manage glycemic control while inpatient.   - Diet [facility]iac [facility]; Fluid - 1500mL       Eatin%  Nausea: No  Hypoglycemia and intervention: No  Fever: No  TPN and/or TF: No  If yes, type of TF/TPN and rate: None    BP (!) 98/0 (BP Location: Left arm, Patient Position: Lying)   Pulse 72   Temp 98.1 °F (36.7 °C) (Oral)   Resp 16   Ht 6' 3" (1.905 m)   Wt 87.6 kg (193 lb 2 oz)   SpO2 99%   BMI 24.14 kg/m²     Labs Reviewed and Include    Recent Labs   Lab 22  0538   *   CALCIUM 9.7   *   K 4.2   CO2 24      BUN 12   CREATININE 1.2     Lab Results   Component Value Date    WBC 8.90 2022    HGB 12.0 (L) 2022    HCT 37.1 (L) 2022    MCV 80 (L) 2022     2022     No results for input(s): TSH, FREET4 in the last 168 hours.  Lab Results   Component Value Date    HGBA1C 7.4 (H) 2022       Nutritional status:   Body mass index is 24.14 kg/m².  Lab Results   Component Value Date    ALBUMIN 3.4 (L) 2022    ALBUMIN 3.6 2022    ALBUMIN 3.8 2022     Lab Results   Component Value Date    PREALBUMIN 30 2022    PREALBUMIN 32 2022    PREALBUMIN 33 2022       Estimated Creatinine Clearance: 100.7 mL/min (based on SCr of 1.2 mg/dL).    Accu-Checks  Recent Labs     22  0929 22  1205 22  1729 22  1946 22  0804 22  1210 22  1606 22  2105 22  0741 22  1143   POCTGLUCOSE 226* 253* 138* 127* 202* 328* 168* 120* 232* 152*       Current Medications and/or Treatments Impacting Glycemic Control  Immunotherapy:    Immunosuppressants       None          Steroids:   Hormones (From admission, onward)      None      "     Pressors:    Autonomic Drugs (From admission, onward)      None          Hyperglycemia/Diabetes Medications:   Antihyperglycemics (From admission, onward)      Start     Stop Route Frequency Ordered    09/05/22 1645  insulin aspart U-100 pen 12 Units         -- SubQ 3 times daily with meals 09/05/22 1414    09/03/22 2100  insulin detemir U-100 pen 28 Units         -- SubQ Nightly 09/03/22 1536    09/01/22 1844  insulin aspart U-100 pen 1-10 Units         -- SubQ Before meals & nightly PRN 09/01/22 6649

## 2022-09-06 NOTE — CARE UPDATE
"RAPID RESPONSE NURSE CHART REVIEW        Chart Reviewed: 09/06/2022, 4:28 AM    MRN: [id number]  Bed: 316/316 A    Dx: Awareness alteration, transient    Kevan Queen has a past medical history of Arthritis, Cardiomyopathy, CHF (congestive heart failure), Diabetes mellitus, Dilated cardiomyopathy, Drug abuse, Hyperosmolar hyperglycemic state (HHS), ICD (implantable cardioverter-defibrillator) in place, Muscle cramping, and Renal disorder.    Last VS: /75 (BP Location: Left arm, Patient Position: Lying)   Pulse 104   Temp 98.2 °F (36.8 °C) (Oral)   Resp 17   Ht 6' 3" (1.905 m)   Wt 88.2 kg (194 lb 7.1 oz)   SpO2 99%   BMI 24.30 kg/m²     24H Vital Sign Range:  Temp:  [97.5 °F (36.4 °C)-98.2 °F (36.8 °C)]   Pulse:  []   Resp:  [16-20]   BP: ()/(0-77)   SpO2:  [98 %-100 %]     Level of Consciousness (AVPU): alert    Recent Labs     09/03/22  0507 09/04/22  0600 09/05/22  0457   WBC 9.30 9.77 8.71   HGB 11.4* 11.7* 11.6*   HCT 35.7* 36.6* 36.4*    319 309       Recent Labs     09/03/22  0507 09/04/22  0600 09/05/22  0457    134* 134*   K 4.2 4.3 4.3    104 103   CO2 26 24 23   CREATININE 1.3 1.1 1.1   * 117* 157*   MG 2.0 2.1 2.1        Recent Labs     09/03/22  1102 09/04/22  0554 09/05/22  0501   PH 7.327* 7.331* 7.373   PCO2 53.9* 52.8* 47.1*   PO2 25* 34* 37*   HCO3 28.2* 27.9 27.4   POCSATURATED 40* 59* 68*   BE 2 2 2        OXYGEN:        O2 Device (Oxygen Therapy): room air    MEWS score: 2    Charge RNFlorida  contacted for MEWS 4. No additional concerns verbalized at this time. Instructed to call [phone] for further concerns or assistance.    Monet Saucedo RN       "

## 2022-09-07 ENCOUNTER — PATIENT OUTREACH (OUTPATIENT)
Dept: ADMINISTRATIVE | Facility: CLINIC | Age: 37
End: 2022-09-07
Payer: MEDICAID

## 2022-09-07 RX ORDER — ACETAMINOPHEN 500 MG
500 TABLET ORAL EVERY 6 HOURS PRN
Status: ON HOLD | COMMUNITY
End: 2022-12-16 | Stop reason: HOSPADM

## 2022-09-07 NOTE — PROGRESS NOTES
Kevan Queen is a 37 y.o. male s/p HM3 lvad from 6.29.22 admitted for evaluation of syncope.   During his hospital stay Mr. Queen was evaluated by neurology for seizures. He was continued on keppra and gabapentin was increased. Previous tramadol was discontinued. His inr today is 2.2 while being administered warfarin 5mg orally daily. Plan to resume home warfarin dose at 4.5mg M/Thur; 3mg other days.

## 2022-09-07 NOTE — PROGRESS NOTES
C3 nurse spoke with Kevan Queen's wife Page for a TCC post hospital discharge follow up call. The patient has a scheduled [facility] appointment with ORALIA Cline   on 9/12/22 @ 1pm.

## 2022-09-07 NOTE — PROGRESS NOTES
Spoke to patient's wife regarding discharge medication dosing.  She verified the patient's discharge warfarin dose to be 4.5mg on M/Thur, and 3 mg all other days. Next INR scheduled for 9/8/22.

## 2022-09-11 PROBLEM — I50.814: Status: ACTIVE | Noted: 2022-09-11

## 2022-09-11 RX ORDER — POTASSIUM CHLORIDE 20 MEQ/1
40 TABLET, EXTENDED RELEASE ORAL 3 TIMES DAILY
Qty: 180 TABLET | Refills: 11 | Status: ON HOLD | OUTPATIENT
Start: 2022-09-11 | End: 2023-06-06 | Stop reason: SDUPTHER

## 2022-09-11 RX ORDER — LEVETIRACETAM 1000 MG/1
1000 TABLET ORAL 2 TIMES DAILY
Qty: 60 TABLET | Refills: 11 | Status: SHIPPED | OUTPATIENT
Start: 2022-09-11 | End: 2023-10-25

## 2022-09-11 RX ORDER — LANOLIN ALCOHOL/MO/W.PET/CERES
800 CREAM (GRAM) TOPICAL 2 TIMES DAILY
Qty: 120 TABLET | Refills: 11 | Status: SHIPPED | OUTPATIENT
Start: 2022-09-11 | End: 2022-09-20

## 2022-09-11 RX ORDER — GABAPENTIN 100 MG/1
100 CAPSULE ORAL 3 TIMES DAILY
Qty: 90 CAPSULE | Refills: 11 | Status: ON HOLD | OUTPATIENT
Start: 2022-09-11 | End: 2023-06-06 | Stop reason: HOSPADM

## 2022-09-11 RX ORDER — FAMOTIDINE 40 MG/1
40 TABLET, FILM COATED ORAL DAILY
Qty: 30 TABLET | Refills: 11 | Status: SHIPPED | OUTPATIENT
Start: 2022-09-11 | End: 2023-06-12

## 2022-09-11 RX ORDER — SPIRONOLACTONE 25 MG/1
25 TABLET ORAL DAILY
Qty: 30 TABLET | Refills: 11 | Status: ON HOLD | OUTPATIENT
Start: 2022-09-11 | End: 2023-08-08 | Stop reason: HOSPADM

## 2022-09-11 RX ORDER — MIRTAZAPINE 15 MG/1
15 TABLET, FILM COATED ORAL NIGHTLY
Qty: 30 TABLET | Refills: 11 | Status: SHIPPED | OUTPATIENT
Start: 2022-09-11 | End: 2023-10-24 | Stop reason: SDUPTHER

## 2022-09-11 RX ORDER — FUROSEMIDE 80 MG/1
80 TABLET ORAL 2 TIMES DAILY
Qty: 60 TABLET | Refills: 11 | Status: SHIPPED | OUTPATIENT
Start: 2022-09-11 | End: 2022-09-20

## 2022-09-11 RX ORDER — FERROUS GLUCONATE 324(37.5)
324 TABLET ORAL
Qty: 30 TABLET | Refills: 11 | Status: ON HOLD | OUTPATIENT
Start: 2022-09-11 | End: 2023-06-06 | Stop reason: HOSPADM

## 2022-09-11 RX ORDER — BUSPIRONE HYDROCHLORIDE 7.5 MG/1
7.5 TABLET ORAL DAILY
Qty: 30 TABLET | Refills: 11 | Status: SHIPPED | OUTPATIENT
Start: 2022-09-11 | End: 2023-06-12

## 2022-09-11 NOTE — PROCEDURES
VAD Interrogation:  TXP JOY INTERROGATIONS 8/4/2022 7/27/2022 7/27/2022   Type HeartMate3 - -   Flow 3.4 - -   Speed 5100 - -   PI 4.7 - -   Power (Serna) 3.5 - -   LSL 4700 - -   Pulsatility No Pulse Intermittent pulse Intermittent pulse

## 2022-09-11 NOTE — PROGRESS NOTES
Subjective:   Patient ID:  Kevan Queen is a 37 y.o. male who presents for LVAD followup visit.    Implant date: 06/29/2022       VAD Interrogation:  TXP JOY INTERROGATIONS 8/4/2022 7/27/2022 7/27/2022   Type HeartMate3 - -   Flow 3.4 - -   Speed 5100 - -   PI 4.7 - -   Power (Serna) 3.5 - -   LSL 4700 - -   Pulsatility No Pulse Intermittent pulse Intermittent pulse        HPI  Patient is a 37 year old BM with BIV systolic heart failure, on home milrinone .375mcg/kg/min who presented 06/13/22 for LVAD implant. Patient underwent LVAD implant with Dr. Paige 06/29/22. 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 Completed IV abx for staph epi infection. Weaned off dobutamine but restarted due to RVF. Of note, he did turn off his pump for several seconds due to accidental disconnect. Patient transitioned to milrinone 0.25mcg/kg/min, due to dobutamine shortage. Of note, patient with seizure history on keppra.     Patient here for his first clinic appointment post discahrge, discharged 07/27/22. He seems to be doing well, on milrinone, tolerating being home. No issues with lvad, and has not disconnected himself again from power supply or batteries.     Review of Systems   Constitutional: Positive for malaise/fatigue. Negative for chills, decreased appetite, diaphoresis, fever, weight gain and weight loss.   Eyes:  Negative for visual disturbance.   Cardiovascular:  Negative for chest pain (at incision site), claudication, cyanosis, dyspnea on exertion, irregular heartbeat, leg swelling, near-syncope, orthopnea, palpitations, paroxysmal nocturnal dyspnea and syncope.   Respiratory:  Positive for shortness of breath. Negative for cough, hemoptysis, sleep disturbances due to breathing, snoring, sputum production and wheezing.    Hematologic/Lymphatic: Negative for adenopathy and bleeding problem. Bruises/bleeds easily.   Skin:  Negative for color change, poor wound healing, rash,  "skin cancer and suspicious lesions.   Musculoskeletal:  Negative for back pain, falls, gout, joint pain and muscle weakness.   Gastrointestinal:  Negative for bloating, abdominal pain, anorexia, constipation, diarrhea, heartburn, hematemesis, hematochezia, hemorrhoids, melena, nausea and vomiting.   Genitourinary:  Negative for nocturia and urgency.   Neurological:  Negative for excessive daytime sleepiness, dizziness, focal weakness, headaches, light-headedness, paresthesias, tremors and weakness.   Psychiatric/Behavioral:  Negative for depression and memory loss. The patient does not have insomnia and is not nervous/anxious.      Objective:   Blood pressure (!) 75/0, temperature 98.2 °F (36.8 °C), temperature source Oral, height 6' 3.5" (1.918 m), weight 85.3 kg (188 lb).body mass index is 23.19 kg/m².      Physical Exam  Vitals and nursing note reviewed.   Constitutional:       General: He is not in acute distress.     Appearance: He is well-developed. He is not diaphoretic.   HENT:      Head: Normocephalic and atraumatic.      Nose: Nose normal.      Mouth/Throat:      Pharynx: No oropharyngeal exudate.   Eyes:      General: No scleral icterus.     Conjunctiva/sclera: Conjunctivae normal.      Pupils: Pupils are equal, round, and reactive to light.   Neck:      Thyroid: No thyromegaly.      Vascular: JVD present.      Trachea: No tracheal deviation.   Cardiovascular:      Rate and Rhythm: Normal rate and regular rhythm.      Heart sounds: Normal heart sounds, S1 normal and S2 normal. No murmur heard.    No friction rub. No gallop.      Comments: Normal LVAD HM3 hum throughout precordium. DLES 2  Pulmonary:      Effort: Pulmonary effort is normal. No respiratory distress.      Breath sounds: Normal breath sounds. No wheezing or rales.   Chest:      Chest wall: No tenderness.   Abdominal:      General: Bowel sounds are normal. There is no distension.      Palpations: Abdomen is soft. There is no mass.      " Tenderness: There is no abdominal tenderness. There is no guarding or rebound.   Musculoskeletal:         General: No tenderness. Normal range of motion.      Cervical back: Normal range of motion and neck supple.   Skin:     General: Skin is warm and dry.      Coloration: Skin is not pale.      Findings: No erythema or rash.   Neurological:      Mental Status: He is alert and oriented to person, place, and time.      Coordination: Coordination normal.   Psychiatric:         Behavior: Behavior normal.         Thought Content: Thought content normal.         Judgment: Judgment normal.     Labs:    Chemistry               Component Value Date/Time      08/04/2022 1257     K 4.8 08/04/2022 1257     CL 98 08/04/2022 1257     CO2 28 08/04/2022 1257     BUN 10 08/04/2022 1257     CREATININE 1.5 (H) 08/04/2022 1257      (H) 08/04/2022 1257               Component Value Date/Time     CALCIUM 10.1 08/04/2022 1257     ALKPHOS 170 (H) 08/04/2022 1257     AST 41 (H) 08/04/2022 1257     ALT 35 08/04/2022 1257     BILITOT 0.6 08/04/2022 1257     ESTGFRAFRICA >60.0 07/27/2022 1229     EGFRNONAA 54.2 (A) 07/27/2022 1229                      Magnesium   Date Value Ref Range Status   08/04/2022 1.8 1.6 - 2.6 mg/dL Final               Lab Results   Component Value Date     WBC 10.02 08/04/2022     HGB 10.9 (L) 08/04/2022     HCT 35.8 (L) 08/04/2022     MCV 78 (L) 08/04/2022      08/04/2022               Lab Results   Component Value Date     INR 2.8 (H) 08/04/2022     INR 2.6 (H) 07/27/2022     INR 2.7 (H) 07/26/2022     PROTIME 14.6 (H) 09/23/2021     PROTIME 14.4 09/04/2021     PROTIME 11.9 10/25/2020                 BNP   Date Value Ref Range Status   08/04/2022 173 (H) 0 - 99 pg/mL Final       Comment:       Values of less than 100 pg/ml are consistent with non-CHF populations.   07/27/2022 198 (H) 0 - 99 pg/mL Final       Comment:       Values of less than 100 pg/ml are consistent with non-CHF populations.    07/25/2022 337 (H) 0 - 99 pg/mL Final       Comment:       Values of less than 100 pg/ml are consistent with non-CHF populations.                 LD   Date Value Ref Range Status   08/04/2022 261 (H) 110 - 260 U/L Final       Comment:       Results are increased in hemolyzed samples.   07/27/2022 290 (H) 110 - 260 U/L Final       Comment:       Results are increased in hemolyzed samples.   07/26/2022 251 110 - 260 U/L Final       Comment:       Results are increased in hemolyzed samples.       Assessment:      1. LVAD (left ventricular assist device) present    2. Chronic left shoulder pain    3. Familial dilated cardiomyopathy    4. Chronic combined systolic and diastolic congestive heart failure    5. Right heart failure secondary to left heart failure-post LVAD surgery        Plan:   Post op discharge 1st clinic appointment. Go over care at home.   Will hopefully look at weaning milrinone in 4-6 weeks, if RV function stabilizes/improves. Patient needs to followup with neuro for seizure history. Additionally cardiac rehab referral placed.   Patient is now NYHA III  Recommend 2 gram sodium restriction and 1500cc fluid restriction.  Encourage physical activity with graded exercise program.  Requested patient to weigh themselves daily, and to notify us if their weight increases by more than 3 lbs in 1 day or 5 lbs in 1 week.     Patient advised that it is recommended that all patients, and their close contacts and household members receive Covid vaccination.    Listed for transplant: No

## 2022-09-12 DIAGNOSIS — Z00.6 EXAMINATION OF PARTICIPANT IN CLINICAL TRIAL: ICD-10-CM

## 2022-09-12 DIAGNOSIS — Z95.811 LVAD (LEFT VENTRICULAR ASSIST DEVICE) PRESENT: Primary | ICD-10-CM

## 2022-09-13 NOTE — PROGRESS NOTES
Per Lara at [facility] - pt unable to have labs drawn yesterday because [facility]  was unavailable. Lara will call pt to reschedule for 9/14

## 2022-09-14 ENCOUNTER — PATIENT MESSAGE (OUTPATIENT)
Dept: CARDIOTHORACIC SURGERY | Facility: CLINIC | Age: 37
End: 2022-09-14
Payer: MEDICAID

## 2022-09-14 DIAGNOSIS — Z95.811 LVAD (LEFT VENTRICULAR ASSIST DEVICE) PRESENT: Primary | ICD-10-CM

## 2022-09-15 ENCOUNTER — PATIENT MESSAGE (OUTPATIENT)
Dept: ADMINISTRATIVE | Facility: OTHER | Age: 37
End: 2022-09-15
Payer: MEDICAID

## 2022-09-15 NOTE — PROGRESS NOTES
Lara/[facility]) called 09/15/22 to ramona appt 09/15/22 to 09/20/22. Patient did not show up today wife ramona for 09/20/22. [facility]) will not be available 09/16/22.

## 2022-09-20 ENCOUNTER — OFFICE VISIT (OUTPATIENT)
Dept: FAMILY MEDICINE | Facility: CLINIC | Age: 37
End: 2022-09-20
Payer: MEDICAID

## 2022-09-20 VITALS
HEART RATE: 58 BPM | RESPIRATION RATE: 20 BRPM | BODY MASS INDEX: 25.36 KG/M2 | SYSTOLIC BLOOD PRESSURE: 103 MMHG | TEMPERATURE: 98 F | WEIGHT: 204 LBS | DIASTOLIC BLOOD PRESSURE: 69 MMHG | OXYGEN SATURATION: 100 % | HEIGHT: 75 IN

## 2022-09-20 DIAGNOSIS — G47.00 INSOMNIA, UNSPECIFIED TYPE: Primary | ICD-10-CM

## 2022-09-20 DIAGNOSIS — F43.10 PTSD (POST-TRAUMATIC STRESS DISORDER): ICD-10-CM

## 2022-09-20 DIAGNOSIS — D63.8 ANEMIA OF CHRONIC DISEASE: ICD-10-CM

## 2022-09-20 PROBLEM — M25.512 LEFT SHOULDER PAIN: Status: RESOLVED | Noted: 2022-05-31 | Resolved: 2022-09-20

## 2022-09-20 PROBLEM — Z51.5 PALLIATIVE CARE ENCOUNTER: Status: RESOLVED | Noted: 2022-06-16 | Resolved: 2022-09-20

## 2022-09-20 LAB
EXT ALBUMIN: 4.2
EXT ALT: 37
EXT AST: 44
EXT BNP: 211
EXT BUN: 10
EXT CALCIUM: 9.5
EXT CHLORIDE: 102
EXT CREATININE: 1.26 MG/DL
EXT GLUCOSE: 203
EXT HEMATOCRIT: 38.9
EXT HEMOGLOBIN: 11.9
EXT MAGNESIUM: 1.9
EXT PLATELETS: 296
EXT POTASSIUM: 4.2
EXT PROTEIN TOTAL: 8
EXT SODIUM: 138 MMOL/L
EXT WBC: 8
INR PPP: 1.6

## 2022-09-20 PROCEDURE — 3078F PR MOST RECENT DIASTOLIC BLOOD PRESSURE < 80 MM HG: ICD-10-PCS | Mod: CPTII,,, | Performed by: NURSE PRACTITIONER

## 2022-09-20 PROCEDURE — 4010F ACE/ARB THERAPY RXD/TAKEN: CPT | Mod: CPTII,,, | Performed by: NURSE PRACTITIONER

## 2022-09-20 PROCEDURE — 3074F SYST BP LT 130 MM HG: CPT | Mod: CPTII,,, | Performed by: NURSE PRACTITIONER

## 2022-09-20 PROCEDURE — 3060F POS MICROALBUMINURIA REV: CPT | Mod: CPTII,,, | Performed by: NURSE PRACTITIONER

## 2022-09-20 PROCEDURE — 1111F PR DISCHARGE MEDS RECONCILED W/ CURRENT OUTPATIENT MED LIST: ICD-10-PCS | Mod: CPTII,,, | Performed by: NURSE PRACTITIONER

## 2022-09-20 PROCEDURE — 99215 OFFICE O/P EST HI 40 MIN: CPT | Mod: PBBFAC,PN | Performed by: NURSE PRACTITIONER

## 2022-09-20 PROCEDURE — 3074F PR MOST RECENT SYSTOLIC BLOOD PRESSURE < 130 MM HG: ICD-10-PCS | Mod: CPTII,,, | Performed by: NURSE PRACTITIONER

## 2022-09-20 PROCEDURE — 3008F PR BODY MASS INDEX (BMI) DOCUMENTED: ICD-10-PCS | Mod: CPTII,,, | Performed by: NURSE PRACTITIONER

## 2022-09-20 PROCEDURE — 1159F MED LIST DOCD IN RCRD: CPT | Mod: CPTII,,, | Performed by: NURSE PRACTITIONER

## 2022-09-20 PROCEDURE — 99214 OFFICE O/P EST MOD 30 MIN: CPT | Mod: S$PBB,,, | Performed by: NURSE PRACTITIONER

## 2022-09-20 PROCEDURE — 3078F DIAST BP <80 MM HG: CPT | Mod: CPTII,,, | Performed by: NURSE PRACTITIONER

## 2022-09-20 PROCEDURE — 1159F PR MEDICATION LIST DOCUMENTED IN MEDICAL RECORD: ICD-10-PCS | Mod: CPTII,,, | Performed by: NURSE PRACTITIONER

## 2022-09-20 PROCEDURE — 3008F BODY MASS INDEX DOCD: CPT | Mod: CPTII,,, | Performed by: NURSE PRACTITIONER

## 2022-09-20 PROCEDURE — 3051F PR MOST RECENT HEMOGLOBIN A1C LEVEL 7.0 - < 8.0%: ICD-10-PCS | Mod: CPTII,,, | Performed by: NURSE PRACTITIONER

## 2022-09-20 PROCEDURE — 3066F PR DOCUMENTATION OF TREATMENT FOR NEPHROPATHY: ICD-10-PCS | Mod: CPTII,,, | Performed by: NURSE PRACTITIONER

## 2022-09-20 PROCEDURE — 99214 PR OFFICE/OUTPT VISIT, EST, LEVL IV, 30-39 MIN: ICD-10-PCS | Mod: S$PBB,,, | Performed by: NURSE PRACTITIONER

## 2022-09-20 PROCEDURE — 4010F PR ACE/ARB THEARPY RXD/TAKEN: ICD-10-PCS | Mod: CPTII,,, | Performed by: NURSE PRACTITIONER

## 2022-09-20 PROCEDURE — 3066F NEPHROPATHY DOC TX: CPT | Mod: CPTII,,, | Performed by: NURSE PRACTITIONER

## 2022-09-20 PROCEDURE — 3051F HG A1C>EQUAL 7.0%<8.0%: CPT | Mod: CPTII,,, | Performed by: NURSE PRACTITIONER

## 2022-09-20 PROCEDURE — 1160F RVW MEDS BY RX/DR IN RCRD: CPT | Mod: CPTII,,, | Performed by: NURSE PRACTITIONER

## 2022-09-20 PROCEDURE — 3060F PR POS MICROALBUMINURIA RESULT DOCUMENTED/REVIEW: ICD-10-PCS | Mod: CPTII,,, | Performed by: NURSE PRACTITIONER

## 2022-09-20 PROCEDURE — 1111F DSCHRG MED/CURRENT MED MERGE: CPT | Mod: CPTII,,, | Performed by: NURSE PRACTITIONER

## 2022-09-20 PROCEDURE — 1160F PR REVIEW ALL MEDS BY PRESCRIBER/CLIN PHARMACIST DOCUMENTED: ICD-10-PCS | Mod: CPTII,,, | Performed by: NURSE PRACTITIONER

## 2022-09-20 RX ORDER — MILRINONE LACTATE 1 MG/ML
INJECTION INTRAVENOUS
Status: ON HOLD | COMMUNITY
Start: 2022-09-07 | End: 2022-12-16 | Stop reason: HOSPADM

## 2022-09-20 NOTE — PROGRESS NOTES
"Patient Name: Kevan Queen   : 1985  MRN: [id number]     SUBJECTIVE:  Kevan Queen is a 37 y.o. male here for Follow-up (6 mos f/u)      22:  FU after LVAD insertion.  Was going to have right heart cath and had seizure in parking lot prior to procedure.  He was told he had a stroke. NO providers want him to get cardiac rehab but wife unable to find any place locally for this.  He was told to see PCP for wellness visit. I referred him to diabetic education as per [facility] staff request but he has not gotten an appt. He was referred to Endocrine for possible Type 1 DM. States he sees NP in  for Endocrine.  I last saw pt in May.     Kevan is requesting a referral to Psychiatry.  He states that his doctors in [place] wanted him to see someone to talk to about his issues.  He states that it is although he feels happy to be alive, he knows that he has some agitation and anger that is controlling him and he feels they he wants somebody to talk to that is not family since they are not fully able to understand his situation.  He does not report any suicidal ideations.  States he has been through a lot and does not like having this LVAD attached to his body.  He does not feel like he has any quality of life currently.  He would just like to have a normal life for as long as that will be to spin with his children and his wife.  Would like a Dexcom but sees Endocrine soon. Takes insulin 3-4 times daily and also finger sticks 3-4 times a day currently.      22:  Pt here today after inpt stay in [place] for heart failure, was referred for a heart transplant.  Pt was dx with DM while inpatient and is in need of Diabetic Education.  Patient is noncompliant with clinic visits. Referred for heart transplant 22.  Appt 22 for transplant.        "He was been followed in clinic and underwent RHC 2022 with biventricular elevated filling pressures and echo showing EF 10%, LVEDD 70 mm, " "severe RVE and RVD with TAPSE 1.4, severe MR and subsequently admitted and discharged 4/25/2022 after aggressive diuresis. Advanced pathway was started"     Last glucose 5/27 was 191.  Ferritin normal at 150.   Pending referrals to DE, Ortho, and Endocrine. Last labs showed hgb 13.8/hct 39.8. BUN 26/creat 1.5 on 5/27/22 labs. Glucose 171 on labs  Pt is c/o a pain in his throat deep in neck.  No redness or coughing or signs of strep noted.  Got tested for COVID prior to D/C from [place]. Denies any procedure being done there.  Wanted to take lozenges as wife just got over cold/sore throat but did not know if he could take them.   Pt is currently on Novolog 12 units TID, Detemir 23 units daily. He gets montly labs ordered.  QUINTIN 65 is pending.  Told he may have type 1 dm. Pending referral to Endocrine.  Left Shoulder pain secondary to device in hospital, pending referral to Ortho.  Anemia, stable on last draw. Iron stores were 243 in April this year.  Anxiety: pt has lots of anxiety related to health condition.  He was started on Lexapro but has not followed up with psych.  Remeron is helping for sleep at night.  He notes that he has issues and is requesting to be referred to someone to talk to, psychiatrist specifically.  He does not think Lexapro is helping anxiety and states Buspirone does not work either.      10/15/21: Pt presents to clinic today after not being seen since April. Multiple no shows. Last note I had tried to contact pt to inform him of UTI and increased PSA but he never followed-up with us since. Wife states he did take abx for UTI. He is here today with complaints of continued abdominal pain and not able to sleep after numerous hospital admissions for n/v, abdominal pain, CP, SOB. Wears life vest. Most recent Echo showing EF 10-15%. Palliative care was consulted during last admission. Continued drug abuse noted. Was on transplant list but is supposed to go to drug rehab first, therefore is not " a candidate for heart transplant. He is seeing renal with next appt being in November. He has CKD. Is seeing new cardiologist in November-Dr. Dempsey.  c/o ongoing Insomnia-states is fully able to sleep during the day but is not able to sleep at night. He will stay awake all night with inability to fall asleep. Was given medications while in hospital including Remeron which per the palliative care dr helped him somewhat.   c/o nausea-ongoing nausea with abdominal pain. scope in September showed chronic gastritis and cholecystitis from an US in which he is not a candidate for surgical intervention. pt states he does not know why he can't sleep but he can't and all he wants to do is go to sleep right now.  previous EKG in 10/21 showed prolonged /QTc 502    4/12/2021: Here today for follow-up.  -Cardiology stopped pt Vistaril due to oversedation and fatigue c/o. Switched Bumex to Torsemide due to itching. States since starting Torsemide he has burning with ejaculation. He stopped taking it and restarted the Bumex he was on and the Vistaril that was prescribed for Insomnia/Itching.  -Intermittent Testicular pain on left testicle-denies pain on urination, denies abdominal pain or inguinal pain. No groin swelling reported. +painful with sex. denies discharge. pain is more localized under scrotum. reports itching inside penis, testicles which all started when began taking Torsemide. Denies trouble urinating.    1/7/2021: Complaints today: Here to establish care with new PCP. Followed by Cardiology. Hospitalized and d/c before Thanksgiving. +life vest. Requesting Cardiology referral.  -NICMO-Walking about 200 feet before getting SOB. Life vest present. has noticed improvement in his condition. States he was addicted to Ecstacy and was using it a lot. Wife states they would get 100 at a time and in no time she would go through 50 of them and so would he. He overdosed on this, which is what led to his hospitalization. His  father also  from HF. He tells me that he is going to get into rehab for his addiction, he is not currently using but he needs to do this in order to get on a list for a heart transplant.  -Insomnia-states is fully able to sleep during the day but is not able to sleep at night. He will stay awake all night with inability to fall asleep. Was given medications while in hospital including Xanax and Ativan and nothing helped.   -Rash to abdomen has been there for over 1 week. Was previously given antifungal creams to treat but it has returned to abdomen. He also has broken out with numerous bumps to entire back and forehead that he noticed once he filled his Bumetadine pills. He states that he had previously filled in [place] and was taking a white pill that was not causing skin eruptions but when he started taking this pink 2mg pill, he noticed the bumps appearing. Mild itching reported.   -Epigastric pain-takes Protonix only when he needs it.   -Testicular pain on left testicle-denies pain on urination, denies abdominal pain or inguinal pain. No groin swelling reported. painful with sex. denies discharge. pain is more localized under scrotum.  Cancer Screening:  Colonoscopy: never done  Vaccines:  Tetanus/Tdap: UTD  Flu: 2020  Pna: 2020            ALLERGIES:   Review of patient's allergies indicates:   Allergen Reactions    Aspirin Other (See Comments)     Mr. Thacker is enrolled in Dr. Paige's Laon Trial and cannot have any aspirin and/or aspirin-containing products. DO NOT cancel any orders for INV Aspirin 100 mg/Placebo. If you have questions, please contact Isabel @ [phone], [phone],[email or web address], secure chat or MS Teams message.    Bumex [bumetanide] Hives    Lactose Diarrhea     Other reaction(s): Abdominal distension, gaseous    Torsemide Hives         ROS:  Review of Systems   Constitutional: Negative.    HENT: Negative.     Eyes: Negative.    Respiratory: Negative.     Cardiovascular:  "Negative.    Gastrointestinal: Negative.    Genitourinary: Negative.    Musculoskeletal: Negative.    Skin: Negative.    Neurological: Negative.    Endo/Heme/Allergies: Negative.    Psychiatric/Behavioral: Negative.     All other systems reviewed and are negative.      OBJECTIVE:  Vital signs  Vitals:    09/20/22 1358   BP: 103/69   BP Location: Left arm   Patient Position: Sitting   BP Method: Large (Automatic)   Pulse: (!) 58   Resp: 20   Temp: 98 °F (36.7 °C)   TempSrc: Oral   SpO2: 100%   Weight: 92.5 kg (204 lb)   Height: 6' 3" (1.905 m)      Body mass index is 25.5 kg/m².    PHYSICAL EXAM:   Physical Exam     ASSESSMENT/PLAN:  1. Insomnia, unspecified type    2. Anemia of chronic disease  Overview:  Lab Results   Component Value Date    HGB 13.8 (L) 05/27/2022       Lab Results   Component Value Date    HCT 39.8 (L) 05/27/2022       Lab Results   Component Value Date    IRON 243 (H) 04/18/2022    TIBC 365 08/31/2021    FERRITIN 150 04/18/2022           3. PTSD (post-traumatic stress disorder)  -     Ambulatory referral/consult to Psychiatry         RESULTS:  Recent Results (from the past 1008 hour(s))   Protime-INR    Collection Time: 08/15/22  2:39 PM   Result Value Ref Range    PT 20.6 (H) 12.5 - 14.5 seconds    INR 1.80 (H) 0.00 - 1.30   Bilirubin, Direct    Collection Time: 08/18/22 12:41 PM   Result Value Ref Range    Bilirubin, Direct 0.3 0.1 - 0.3 mg/dL   BNP    Collection Time: 08/18/22 12:41 PM   Result Value Ref Range    BNP 80 0 - 99 pg/mL   CBC Auto Differential    Collection Time: 08/18/22 12:41 PM   Result Value Ref Range    WBC 7.34 3.90 - 12.70 K/uL    RBC 4.97 4.60 - 6.20 M/uL    Hemoglobin 12.8 (L) 14.0 - 18.0 g/dL    Hematocrit 40.5 40.0 - 54.0 %    MCV 82 82 - 98 fL    MCH 25.8 (L) 27.0 - 31.0 pg    MCHC 31.6 (L) 32.0 - 36.0 g/dL    RDW 21.4 (H) 11.5 - 14.5 %    Platelets 432 150 - 450 K/uL    MPV 8.6 (L) 9.2 - 12.9 fL    Immature Granulocytes 0.1 0.0 - 0.5 %    Gran # (ANC) 4.3 1.8 - 7.7 " K/uL    Immature Grans (Abs) 0.01 0.00 - 0.04 K/uL    Lymph # 2.0 1.0 - 4.8 K/uL    Mono # 0.8 0.3 - 1.0 K/uL    Eos # 0.2 0.0 - 0.5 K/uL    Baso # 0.05 0.00 - 0.20 K/uL    nRBC 0 0 /100 WBC    Gran % 59.0 38.0 - 73.0 %    Lymph % 27.0 18.0 - 48.0 %    Mono % 10.6 4.0 - 15.0 %    Eosinophil % 2.6 0.0 - 8.0 %    Basophil % 0.7 0.0 - 1.9 %    Differential Method Automated    Comprehensive Metabolic Panel    Collection Time: 08/18/22 12:41 PM   Result Value Ref Range    Sodium 133 (L) 136 - 145 mmol/L    Potassium 4.3 3.5 - 5.1 mmol/L    Chloride 98 95 - 110 mmol/L    CO2 24 23 - 29 mmol/L    Glucose 218 (H) 70 - 110 mg/dL    BUN 10 6 - 20 mg/dL    Creatinine 1.5 (H) 0.5 - 1.4 mg/dL    Calcium 10.7 (H) 8.7 - 10.5 mg/dL    Total Protein 9.1 (H) 6.0 - 8.4 g/dL    Albumin 3.9 3.5 - 5.2 g/dL    Total Bilirubin 0.6 0.1 - 1.0 mg/dL    Alkaline Phosphatase 175 (H) 55 - 135 U/L    AST 72 (H) 10 - 40 U/L    ALT 43 10 - 44 U/L    Anion Gap 11 8 - 16 mmol/L    eGFR >60.0 >60 mL/min/1.73 m^2   C-Reactive Protein    Collection Time: 08/18/22 12:41 PM   Result Value Ref Range    CRP 2.8 0.0 - 8.2 mg/L   Lactate Dehydrogenase    Collection Time: 08/18/22 12:41 PM   Result Value Ref Range     (H) 110 - 260 U/L   Magnesium    Collection Time: 08/18/22 12:41 PM   Result Value Ref Range    Magnesium 2.0 1.6 - 2.6 mg/dL   Phosphorus    Collection Time: 08/18/22 12:41 PM   Result Value Ref Range    Phosphorus 3.7 2.7 - 4.5 mg/dL   Prealbumin    Collection Time: 08/18/22 12:41 PM   Result Value Ref Range    Prealbumin 30 20 - 43 mg/dL   Protime-INR    Collection Time: 08/18/22 12:41 PM   Result Value Ref Range    Prothrombin Time 27.0 (H) 9.0 - 12.5 sec    INR 2.8 (H) 0.8 - 1.2   Nicotine and Metabolites, Blood    Collection Time: 08/18/22  2:43 PM   Result Value Ref Range    Nicotine, Serum <3.0 <3.0 ng/mL    Cotinine, Serum <3.0 <3.0 ng/mL   Drugs of Abuse Screen, Blood    Collection Time: 08/18/22  2:43 PM   Result Value Ref Range     Amphetamine Scrn Negative     Barbiturate Scrn Negative     Benzodiazepines Negative     Cocaine Lvl Negative     Methadone (Dolophine), Serum Negative     Opiates, Blood Negative     Phencyclidine, Blood Negative     Propoxyphene Negative     THC (Marijuana) Metabolite, bl Negative     Alcohol Scrn Negative    Bilirubin, Direct    Collection Time: 08/22/22  1:17 PM   Result Value Ref Range    Bilirubin, Direct 0.3 0.1 - 0.3 mg/dL   BNP    Collection Time: 08/22/22  1:17 PM   Result Value Ref Range    BNP 96 0 - 99 pg/mL   CBC Auto Differential    Collection Time: 08/22/22  1:17 PM   Result Value Ref Range    WBC 8.26 3.90 - 12.70 K/uL    RBC 4.97 4.60 - 6.20 M/uL    Hemoglobin 12.3 (L) 14.0 - 18.0 g/dL    Hematocrit 38.6 (L) 40.0 - 54.0 %    MCV 78 (L) 82 - 98 fL    MCH 24.7 (L) 27.0 - 31.0 pg    MCHC 31.9 (L) 32.0 - 36.0 g/dL    RDW 21.1 (H) 11.5 - 14.5 %    Platelets 370 150 - 450 K/uL    MPV 8.9 (L) 9.2 - 12.9 fL    Immature Granulocytes 0.1 0.0 - 0.5 %    Gran # (ANC) 5.3 1.8 - 7.7 K/uL    Immature Grans (Abs) 0.01 0.00 - 0.04 K/uL    Lymph # 2.1 1.0 - 4.8 K/uL    Mono # 0.6 0.3 - 1.0 K/uL    Eos # 0.1 0.0 - 0.5 K/uL    Baso # 0.05 0.00 - 0.20 K/uL    nRBC 0 0 /100 WBC    Gran % 64.0 38.0 - 73.0 %    Lymph % 25.9 18.0 - 48.0 %    Mono % 7.7 4.0 - 15.0 %    Eosinophil % 1.7 0.0 - 8.0 %    Basophil % 0.6 0.0 - 1.9 %    Differential Method Automated    Comprehensive Metabolic Panel    Collection Time: 08/22/22  1:17 PM   Result Value Ref Range    Sodium 138 136 - 145 mmol/L    Potassium 4.1 3.5 - 5.1 mmol/L    Chloride 98 95 - 110 mmol/L    CO2 28 23 - 29 mmol/L    Glucose 233 (H) 70 - 110 mg/dL    BUN 12 6 - 20 mg/dL    Creatinine 1.4 0.5 - 1.4 mg/dL    Calcium 10.2 8.7 - 10.5 mg/dL    Total Protein 9.0 (H) 6.0 - 8.4 g/dL    Albumin 3.8 3.5 - 5.2 g/dL    Total Bilirubin 0.7 0.1 - 1.0 mg/dL    Alkaline Phosphatase 152 (H) 55 - 135 U/L    AST 42 (H) 10 - 40 U/L    ALT 33 10 - 44 U/L    Anion Gap 12 8 - 16 mmol/L     eGFR >60.0 >60 mL/min/1.73 m^2   C-Reactive Protein    Collection Time: 08/22/22  1:17 PM   Result Value Ref Range    CRP 4.1 0.0 - 8.2 mg/L   Lactate Dehydrogenase    Collection Time: 08/22/22  1:17 PM   Result Value Ref Range     (H) 110 - 260 U/L   Magnesium    Collection Time: 08/22/22  1:17 PM   Result Value Ref Range    Magnesium 1.9 1.6 - 2.6 mg/dL   Phosphorus    Collection Time: 08/22/22  1:17 PM   Result Value Ref Range    Phosphorus 3.8 2.7 - 4.5 mg/dL   Prealbumin    Collection Time: 08/22/22  1:17 PM   Result Value Ref Range    Prealbumin 28 20 - 43 mg/dL   Protime-INR    Collection Time: 08/22/22  1:17 PM   Result Value Ref Range    Prothrombin Time 18.2 (H) 9.0 - 12.5 sec    INR 1.8 (H) 0.8 - 1.2   Prothrombin w/ INR and PTT    Collection Time: 08/26/22 12:00 AM   Result Value Ref Range    INR 1.8    Protime-INR    Collection Time: 08/30/22  3:45 PM   Result Value Ref Range    PT 22.7 (H) 12.5 - 14.5 seconds    INR 2.05 (H) 0.00 - 1.30   Echo    Collection Time: 09/01/22  9:22 AM   Result Value Ref Range    BSA 2.13 m2    TDI SEPTAL 0.06 m/s    LV LATERAL E/E' RATIO 5.00 m/s    LV SEPTAL E/E' RATIO 6.67 m/s    LA WIDTH 3.65 cm    TDI LATERAL 0.08 m/s    LVIDd 5.97 3.5 - 6.0 cm    IVS 0.77 0.6 - 1.1 cm    Posterior Wall 0.82 0.6 - 1.1 cm    LVIDs 5.70 (A) 2.1 - 4.0 cm    FS 5 28 - 44 %    LA volume 26.38 cm3    Sinus 3.00 cm    STJ 2.51 cm    Ascending aorta 2.68 cm    LV mass 183.06 g    LA size 2.00 cm    RVDD 5.95 cm    TAPSE 0.86 cm    RV S' 4.48 cm/s    Left Ventricle Relative Wall Thickness 0.27 cm    Mean e' 0.07 m/s    LVOT diameter 2.44 cm    LVOT area 4.7 cm2    E/E' ratio 5.71 m/s    MV Peak E Carloz 0.40 m/s    TR Max Carloz 2.18 m/s    LV Systolic Volume 160.25 mL    LV Systolic Volume Index 74.9 mL/m2    LV Diastolic Volume 177.94 mL    LV Diastolic Volume Index 83.15 mL/m2    LA Volume Index 12.3 mL/m2    LV Mass Index 86 g/m2    RA Major Axis 3.91 cm    Left Atrium Minor Axis 3.93 cm     Left Atrium Major Axis 4.63 cm    Triscuspid Valve Regurgitation Peak Gradient 19 mmHg    LA Volume Index (Mod) 17.2 mL/m2    LA volume (mod) 36.79 cm3    RA Width 3.40 cm    Right Atrial Pressure (from IVC) 3 mmHg    EF 10 %    TV rest pulmonary artery pressure 22 mmHg   Bilirubin, Direct    Collection Time: 09/01/22  9:30 AM   Result Value Ref Range    Bilirubin, Direct 0.2 0.1 - 0.3 mg/dL   BNP    Collection Time: 09/01/22  9:30 AM   Result Value Ref Range    BNP 33 0 - 99 pg/mL   CBC Auto Differential    Collection Time: 09/01/22  9:30 AM   Result Value Ref Range    WBC 11.14 3.90 - 12.70 K/uL    RBC 4.94 4.60 - 6.20 M/uL    Hemoglobin 12.6 (L) 14.0 - 18.0 g/dL    Hematocrit 39.7 (L) 40.0 - 54.0 %    MCV 80 (L) 82 - 98 fL    MCH 25.5 (L) 27.0 - 31.0 pg    MCHC 31.7 (L) 32.0 - 36.0 g/dL    RDW 21.2 (H) 11.5 - 14.5 %    Platelets 330 150 - 450 K/uL    MPV 9.5 9.2 - 12.9 fL    Immature Granulocytes 0.3 0.0 - 0.5 %    Gran # (ANC) 7.0 1.8 - 7.7 K/uL    Immature Grans (Abs) 0.03 0.00 - 0.04 K/uL    Lymph # 3.0 1.0 - 4.8 K/uL    Mono # 0.9 0.3 - 1.0 K/uL    Eos # 0.2 0.0 - 0.5 K/uL    Baso # 0.06 0.00 - 0.20 K/uL    nRBC 0 0 /100 WBC    Gran % 63.0 38.0 - 73.0 %    Lymph % 26.9 18.0 - 48.0 %    Mono % 7.7 4.0 - 15.0 %    Eosinophil % 1.6 0.0 - 8.0 %    Basophil % 0.5 0.0 - 1.9 %    Differential Method Automated    Comprehensive Metabolic Panel    Collection Time: 09/01/22  9:30 AM   Result Value Ref Range    Sodium 135 (L) 136 - 145 mmol/L    Potassium 3.7 3.5 - 5.1 mmol/L    Chloride 99 95 - 110 mmol/L    CO2 25 23 - 29 mmol/L    Glucose 234 (H) 70 - 110 mg/dL    BUN 17 6 - 20 mg/dL    Creatinine 1.7 (H) 0.5 - 1.4 mg/dL    Calcium 9.9 8.7 - 10.5 mg/dL    Total Protein 8.7 (H) 6.0 - 8.4 g/dL    Albumin 3.8 3.5 - 5.2 g/dL    Total Bilirubin 0.4 0.1 - 1.0 mg/dL    Alkaline Phosphatase 126 55 - 135 U/L    AST 38 10 - 40 U/L    ALT 35 10 - 44 U/L    Anion Gap 11 8 - 16 mmol/L    eGFR 52.6 (A) >60 mL/min/1.73 m^2    C-Reactive Protein    Collection Time: 09/01/22  9:30 AM   Result Value Ref Range    CRP 1.6 0.0 - 8.2 mg/L   Lactate Dehydrogenase    Collection Time: 09/01/22  9:30 AM   Result Value Ref Range     110 - 260 U/L   Magnesium    Collection Time: 09/01/22  9:30 AM   Result Value Ref Range    Magnesium 1.6 1.6 - 2.6 mg/dL   Phosphorus    Collection Time: 09/01/22  9:30 AM   Result Value Ref Range    Phosphorus 3.7 2.7 - 4.5 mg/dL   Prealbumin    Collection Time: 09/01/22  9:30 AM   Result Value Ref Range    Prealbumin 33 20 - 43 mg/dL   Protime-INR    Collection Time: 09/01/22  9:30 AM   Result Value Ref Range    Prothrombin Time 20.2 (H) 9.0 - 12.5 sec    INR 2.0 (H) 0.8 - 1.2   CBC auto differential    Collection Time: 09/01/22 10:39 AM   Result Value Ref Range    WBC 10.19 3.90 - 12.70 K/uL    RBC 4.91 4.60 - 6.20 M/uL    Hemoglobin 12.4 (L) 14.0 - 18.0 g/dL    Hematocrit 38.6 (L) 40.0 - 54.0 %    MCV 79 (L) 82 - 98 fL    MCH 25.3 (L) 27.0 - 31.0 pg    MCHC 32.1 32.0 - 36.0 g/dL    RDW 21.2 (H) 11.5 - 14.5 %    Platelets 249 150 - 450 K/uL    MPV 9.9 9.2 - 12.9 fL    Immature Granulocytes 0.2 0.0 - 0.5 %    Gran # (ANC) 6.7 1.8 - 7.7 K/uL    Immature Grans (Abs) 0.02 0.00 - 0.04 K/uL    Lymph # 2.5 1.0 - 4.8 K/uL    Mono # 0.8 0.3 - 1.0 K/uL    Eos # 0.2 0.0 - 0.5 K/uL    Baso # 0.05 0.00 - 0.20 K/uL    nRBC 0 0 /100 WBC    Gran % 65.7 38.0 - 73.0 %    Lymph % 24.2 18.0 - 48.0 %    Mono % 7.9 4.0 - 15.0 %    Eosinophil % 1.5 0.0 - 8.0 %    Basophil % 0.5 0.0 - 1.9 %    Platelet Estimate Appears normal     Aniso Slight     Poik Slight     Hypo Occasional     Ovalocytes Occasional     Tear Drop Cells Occasional     [place] Cells Occasional     Spherocytes Occasional     Schistocytes Present     Fragmented Cells Occasional     Differential Method Automated    Comprehensive metabolic panel    Collection Time: 09/01/22 10:39 AM   Result Value Ref Range    Sodium 134 (L) 136 - 145 mmol/L    Potassium 4.5 3.5 - 5.1  mmol/L    Chloride 100 95 - 110 mmol/L    CO2 22 (L) 23 - 29 mmol/L    Glucose 235 (H) 70 - 110 mg/dL    BUN 17 6 - 20 mg/dL    Creatinine 1.6 (H) 0.5 - 1.4 mg/dL    Calcium 9.7 8.7 - 10.5 mg/dL    Total Protein 8.9 (H) 6.0 - 8.4 g/dL    Albumin 3.8 3.5 - 5.2 g/dL    Total Bilirubin 0.4 0.1 - 1.0 mg/dL    Alkaline Phosphatase 131 55 - 135 U/L    AST 57 (H) 10 - 40 U/L    ALT 40 10 - 44 U/L    Anion Gap 12 8 - 16 mmol/L    eGFR 56.6 (A) >60 mL/min/1.73 m^2   Protime-INR    Collection Time: 09/01/22 10:39 AM   Result Value Ref Range    Prothrombin Time 23.0 (H) 9.0 - 12.5 sec    INR 2.3 (H) 0.8 - 1.2   Hemoglobin A1C    Collection Time: 09/01/22 10:39 AM   Result Value Ref Range    Hemoglobin A1C 7.4 (H) 4.0 - 5.6 %    Estimated Avg Glucose 166 (H) 68 - 131 mg/dL   POCT glucose    Collection Time: 09/01/22  7:29 PM   Result Value Ref Range    POCT Glucose 286 (H) 70 - 110 mg/dL   POCT glucose    Collection Time: 09/02/22 12:20 AM   Result Value Ref Range    POCT Glucose 167 (H) 70 - 110 mg/dL   CBC auto differential    Collection Time: 09/02/22  4:16 AM   Result Value Ref Range    WBC 12.72 (H) 3.90 - 12.70 K/uL    RBC 4.60 4.60 - 6.20 M/uL    Hemoglobin 12.3 (L) 14.0 - 18.0 g/dL    Hematocrit 36.2 (L) 40.0 - 54.0 %    MCV 79 (L) 82 - 98 fL    MCH 26.7 (L) 27.0 - 31.0 pg    MCHC 34.0 32.0 - 36.0 g/dL    RDW 21.7 (H) 11.5 - 14.5 %    Platelets 279 150 - 450 K/uL    MPV 9.3 9.2 - 12.9 fL    Immature Granulocytes 0.3 0.0 - 0.5 %    Gran # (ANC) 9.1 (H) 1.8 - 7.7 K/uL    Immature Grans (Abs) 0.04 0.00 - 0.04 K/uL    Lymph # 2.5 1.0 - 4.8 K/uL    Mono # 1.0 0.3 - 1.0 K/uL    Eos # 0.1 0.0 - 0.5 K/uL    Baso # 0.04 0.00 - 0.20 K/uL    nRBC 0 0 /100 WBC    Gran % 71.3 38.0 - 73.0 %    Lymph % 19.7 18.0 - 48.0 %    Mono % 7.5 4.0 - 15.0 %    Eosinophil % 0.9 0.0 - 8.0 %    Basophil % 0.3 0.0 - 1.9 %    Differential Method Automated    Basic metabolic panel    Collection Time: 09/02/22  4:16 AM   Result Value Ref Range     Sodium 138 136 - 145 mmol/L    Potassium 4.1 3.5 - 5.1 mmol/L    Chloride 103 95 - 110 mmol/L    CO2 25 23 - 29 mmol/L    Glucose 190 (H) 70 - 110 mg/dL    BUN 17 6 - 20 mg/dL    Creatinine 1.3 0.5 - 1.4 mg/dL    Calcium 10.1 8.7 - 10.5 mg/dL    Anion Gap 10 8 - 16 mmol/L    eGFR >60.0 >60 mL/min/1.73 m^2   Magnesium    Collection Time: 09/02/22  4:16 AM   Result Value Ref Range    Magnesium 1.9 1.6 - 2.6 mg/dL   APTT    Collection Time: 09/02/22  4:16 AM   Result Value Ref Range    aPTT 33.3 (H) 21.0 - 32.0 sec   Protime-INR    Collection Time: 09/02/22  4:16 AM   Result Value Ref Range    Prothrombin Time 19.0 (H) 9.0 - 12.5 sec    INR 1.9 (H) 0.8 - 1.2   Lactate dehydrogenase    Collection Time: 09/02/22  4:16 AM   Result Value Ref Range     110 - 260 U/L   Brain natriuretic peptide    Collection Time: 09/02/22  4:16 AM   Result Value Ref Range    BNP 43 0 - 99 pg/mL   C-reactive protein    Collection Time: 09/02/22  4:16 AM   Result Value Ref Range    CRP 2.0 0.0 - 8.2 mg/L   Prealbumin    Collection Time: 09/02/22  4:16 AM   Result Value Ref Range    Prealbumin 32 20 - 43 mg/dL   Hepatic function panel    Collection Time: 09/02/22  4:16 AM   Result Value Ref Range    Total Protein 8.2 6.0 - 8.4 g/dL    Albumin 3.6 3.5 - 5.2 g/dL    Total Bilirubin 0.5 0.1 - 1.0 mg/dL    Bilirubin, Direct 0.2 0.1 - 0.3 mg/dL    AST 36 10 - 40 U/L    ALT 32 10 - 44 U/L    Alkaline Phosphatase 117 55 - 135 U/L   POCT glucose    Collection Time: 09/02/22  7:49 AM   Result Value Ref Range    POCT Glucose 264 (H) 70 - 110 mg/dL   ISTAT PROCEDURE    Collection Time: 09/02/22 11:08 AM   Result Value Ref Range    POC PH 7.371 7.35 - 7.45    POC PCO2 51.3 (H) 35 - 45 mmHg    POC PO2 31 (L) 40 - 60 mmHg    POC HCO3 29.7 (H) 24 - 28 mmol/L    POC BE 4 -2 to 2 mmol/L    POC SATURATED O2 56 (L) 95 - 100 %    POC TCO2 31 (H) 24 - 29 mmol/L    Sample VENOUS     Site Other     Allens Test N/A     [facility] Room Air     Mode SPONT    POCT  glucose    Collection Time: 09/02/22 11:18 AM   Result Value Ref Range    POCT Glucose 129 (H) 70 - 110 mg/dL   POCT glucose    Collection Time: 09/02/22  4:18 PM   Result Value Ref Range    POCT Glucose 169 (H) 70 - 110 mg/dL   POCT glucose    Collection Time: 09/02/22  9:03 PM   Result Value Ref Range    POCT Glucose 198 (H) 70 - 110 mg/dL   CBC auto differential    Collection Time: 09/03/22  5:07 AM   Result Value Ref Range    WBC 9.30 3.90 - 12.70 K/uL    RBC 4.49 (L) 4.60 - 6.20 M/uL    Hemoglobin 11.4 (L) 14.0 - 18.0 g/dL    Hematocrit 35.7 (L) 40.0 - 54.0 %    MCV 80 (L) 82 - 98 fL    MCH 25.4 (L) 27.0 - 31.0 pg    MCHC 31.9 (L) 32.0 - 36.0 g/dL    RDW 21.0 (H) 11.5 - 14.5 %    Platelets 306 150 - 450 K/uL    MPV 9.6 9.2 - 12.9 fL    Immature Granulocytes 0.2 0.0 - 0.5 %    Gran # (ANC) 6.1 1.8 - 7.7 K/uL    Immature Grans (Abs) 0.02 0.00 - 0.04 K/uL    Lymph # 2.3 1.0 - 4.8 K/uL    Mono # 0.7 0.3 - 1.0 K/uL    Eos # 0.2 0.0 - 0.5 K/uL    Baso # 0.03 0.00 - 0.20 K/uL    nRBC 0 0 /100 WBC    Gran % 65.8 38.0 - 73.0 %    Lymph % 24.3 18.0 - 48.0 %    Mono % 7.8 4.0 - 15.0 %    Eosinophil % 1.6 0.0 - 8.0 %    Basophil % 0.3 0.0 - 1.9 %    Differential Method Automated    Basic metabolic panel    Collection Time: 09/03/22  5:07 AM   Result Value Ref Range    Sodium 136 136 - 145 mmol/L    Potassium 4.2 3.5 - 5.1 mmol/L    Chloride 103 95 - 110 mmol/L    CO2 26 23 - 29 mmol/L    Glucose 280 (H) 70 - 110 mg/dL    BUN 13 6 - 20 mg/dL    Creatinine 1.3 0.5 - 1.4 mg/dL    Calcium 9.5 8.7 - 10.5 mg/dL    Anion Gap 7 (L) 8 - 16 mmol/L    eGFR >60.0 >60 mL/min/1.73 m^2   Magnesium    Collection Time: 09/03/22  5:07 AM   Result Value Ref Range    Magnesium 2.0 1.6 - 2.6 mg/dL   APTT    Collection Time: 09/03/22  5:07 AM   Result Value Ref Range    aPTT 29.5 21.0 - 32.0 sec   Protime-INR    Collection Time: 09/03/22  5:07 AM   Result Value Ref Range    Prothrombin Time 16.1 (H) 9.0 - 12.5 sec    INR 1.6 (H) 0.8 - 1.2    Lactate dehydrogenase    Collection Time: 09/03/22  5:07 AM   Result Value Ref Range     110 - 260 U/L   Levetiracetam level    Collection Time: 09/03/22  5:07 AM   Result Value Ref Range    Levetiracetam Lvl 30.7 3.0 - 60.0 ug/mL   POCT glucose    Collection Time: 09/03/22  7:55 AM   Result Value Ref Range    POCT Glucose 185 (H) 70 - 110 mg/dL   ISTAT PROCEDURE    Collection Time: 09/03/22 10:42 AM   Result Value Ref Range    POC PH 7.290 (L) 7.35 - 7.45    POC PCO2 57.4 (H) 35 - 45 mmHg    POC PO2 23 (L) 40 - 60 mmHg    POC HCO3 27.6 24 - 28 mmol/L    POC BE 1 -2 to 2 mmol/L    POC SATURATED O2 33 (L) 95 - 100 %    POC TCO2 29 24 - 29 mmol/L    Sample VENOUS     Site Other     Allens Test N/A    ISTAT PROCEDURE    Collection Time: 09/03/22 11:02 AM   Result Value Ref Range    POC PH 7.327 (L) 7.35 - 7.45    POC PCO2 53.9 (H) 35 - 45 mmHg    POC PO2 25 (L) 40 - 60 mmHg    POC HCO3 28.2 (H) 24 - 28 mmol/L    POC BE 2 -2 to 2 mmol/L    POC SATURATED O2 40 (L) 95 - 100 %    POC TCO2 30 (H) 24 - 29 mmol/L    Sample VENOUS     Site Other     Allens Test N/A    POCT glucose    Collection Time: 09/03/22 12:06 PM   Result Value Ref Range    POCT Glucose 150 (H) 70 - 110 mg/dL   Lactic acid, plasma    Collection Time: 09/03/22 12:12 PM   Result Value Ref Range    Lactate (Lactic Acid) 1.5 0.5 - 2.2 mmol/L   ISTAT PROCEDURE    Collection Time: 09/04/22  5:54 AM   Result Value Ref Range    POC PH 7.331 (L) 7.35 - 7.45    POC PCO2 52.8 (H) 35 - 45 mmHg    POC PO2 34 (L) 40 - 60 mmHg    POC HCO3 27.9 24 - 28 mmol/L    POC BE 2 -2 to 2 mmol/L    POC SATURATED O2 59 (L) 95 - 100 %    POC TCO2 29 24 - 29 mmol/L    Sample VENOUS     Site Other     Allens Test N/A    CBC auto differential    Collection Time: 09/04/22  6:00 AM   Result Value Ref Range    WBC 9.77 3.90 - 12.70 K/uL    RBC 4.60 4.60 - 6.20 M/uL    Hemoglobin 11.7 (L) 14.0 - 18.0 g/dL    Hematocrit 36.6 (L) 40.0 - 54.0 %    MCV 80 (L) 82 - 98 fL    MCH 25.4 (L)  27.0 - 31.0 pg    MCHC 32.0 32.0 - 36.0 g/dL    RDW 21.6 (H) 11.5 - 14.5 %    Platelets 319 150 - 450 K/uL    MPV 9.5 9.2 - 12.9 fL    Immature Granulocytes 0.3 0.0 - 0.5 %    Gran # (ANC) 6.4 1.8 - 7.7 K/uL    Immature Grans (Abs) 0.03 0.00 - 0.04 K/uL    Lymph # 2.3 1.0 - 4.8 K/uL    Mono # 0.8 0.3 - 1.0 K/uL    Eos # 0.2 0.0 - 0.5 K/uL    Baso # 0.04 0.00 - 0.20 K/uL    nRBC 0 0 /100 WBC    Gran % 65.6 38.0 - 73.0 %    Lymph % 23.5 18.0 - 48.0 %    Mono % 8.2 4.0 - 15.0 %    Eosinophil % 2.0 0.0 - 8.0 %    Basophil % 0.4 0.0 - 1.9 %    Differential Method Automated    Basic metabolic panel    Collection Time: 09/04/22  6:00 AM   Result Value Ref Range    Sodium 134 (L) 136 - 145 mmol/L    Potassium 4.3 3.5 - 5.1 mmol/L    Chloride 104 95 - 110 mmol/L    CO2 24 23 - 29 mmol/L    Glucose 117 (H) 70 - 110 mg/dL    BUN 10 6 - 20 mg/dL    Creatinine 1.1 0.5 - 1.4 mg/dL    Calcium 9.7 8.7 - 10.5 mg/dL    Anion Gap 6 (L) 8 - 16 mmol/L    eGFR >60.0 >60 mL/min/1.73 m^2   Magnesium    Collection Time: 09/04/22  6:00 AM   Result Value Ref Range    Magnesium 2.1 1.6 - 2.6 mg/dL   APTT    Collection Time: 09/04/22  6:00 AM   Result Value Ref Range    aPTT 30.4 21.0 - 32.0 sec   Protime-INR    Collection Time: 09/04/22  6:00 AM   Result Value Ref Range    Prothrombin Time 17.9 (H) 9.0 - 12.5 sec    INR 1.8 (H) 0.8 - 1.2   Lactate dehydrogenase    Collection Time: 09/04/22  6:00 AM   Result Value Ref Range     110 - 260 U/L   POCT glucose    Collection Time: 09/04/22  9:29 AM   Result Value Ref Range    POCT Glucose 226 (H) 70 - 110 mg/dL   POCT glucose    Collection Time: 09/04/22 12:05 PM   Result Value Ref Range    POCT Glucose 253 (H) 70 - 110 mg/dL   POCT glucose    Collection Time: 09/04/22  5:29 PM   Result Value Ref Range    POCT Glucose 138 (H) 70 - 110 mg/dL   POCT glucose    Collection Time: 09/04/22  7:46 PM   Result Value Ref Range    POCT Glucose 127 (H) 70 - 110 mg/dL   CBC auto differential    Collection  Time: 09/05/22  4:57 AM   Result Value Ref Range    WBC 8.71 3.90 - 12.70 K/uL    RBC 4.64 4.60 - 6.20 M/uL    Hemoglobin 11.6 (L) 14.0 - 18.0 g/dL    Hematocrit 36.4 (L) 40.0 - 54.0 %    MCV 78 (L) 82 - 98 fL    MCH 25.0 (L) 27.0 - 31.0 pg    MCHC 31.9 (L) 32.0 - 36.0 g/dL    RDW 21.4 (H) 11.5 - 14.5 %    Platelets 309 150 - 450 K/uL    MPV 9.1 (L) 9.2 - 12.9 fL    Immature Granulocytes 0.2 0.0 - 0.5 %    Gran # (ANC) 5.4 1.8 - 7.7 K/uL    Immature Grans (Abs) 0.02 0.00 - 0.04 K/uL    Lymph # 2.2 1.0 - 4.8 K/uL    Mono # 0.8 0.3 - 1.0 K/uL    Eos # 0.2 0.0 - 0.5 K/uL    Baso # 0.04 0.00 - 0.20 K/uL    nRBC 0 0 /100 WBC    Gran % 62.2 38.0 - 73.0 %    Lymph % 25.4 18.0 - 48.0 %    Mono % 9.3 4.0 - 15.0 %    Eosinophil % 2.4 0.0 - 8.0 %    Basophil % 0.5 0.0 - 1.9 %    Differential Method Automated    Basic metabolic panel    Collection Time: 09/05/22  4:57 AM   Result Value Ref Range    Sodium 134 (L) 136 - 145 mmol/L    Potassium 4.3 3.5 - 5.1 mmol/L    Chloride 103 95 - 110 mmol/L    CO2 23 23 - 29 mmol/L    Glucose 157 (H) 70 - 110 mg/dL    BUN 11 6 - 20 mg/dL    Creatinine 1.1 0.5 - 1.4 mg/dL    Calcium 9.7 8.7 - 10.5 mg/dL    Anion Gap 8 8 - 16 mmol/L    eGFR >60.0 >60 mL/min/1.73 m^2   Magnesium    Collection Time: 09/05/22  4:57 AM   Result Value Ref Range    Magnesium 2.1 1.6 - 2.6 mg/dL   APTT    Collection Time: 09/05/22  4:57 AM   Result Value Ref Range    aPTT 29.4 21.0 - 32.0 sec   Protime-INR    Collection Time: 09/05/22  4:57 AM   Result Value Ref Range    Prothrombin Time 18.8 (H) 9.0 - 12.5 sec    INR 1.9 (H) 0.8 - 1.2   Lactate dehydrogenase    Collection Time: 09/05/22  4:57 AM   Result Value Ref Range     110 - 260 U/L   Brain natriuretic peptide    Collection Time: 09/05/22  4:57 AM   Result Value Ref Range    BNP 90 0 - 99 pg/mL   C-reactive protein    Collection Time: 09/05/22  4:57 AM   Result Value Ref Range    CRP 2.9 0.0 - 8.2 mg/L   Hepatic function panel    Collection Time:  09/05/22  4:57 AM   Result Value Ref Range    Total Protein 7.7 6.0 - 8.4 g/dL    Albumin 3.4 (L) 3.5 - 5.2 g/dL    Total Bilirubin 0.4 0.1 - 1.0 mg/dL    Bilirubin, Direct 0.2 0.1 - 0.3 mg/dL    AST 38 10 - 40 U/L    ALT 37 10 - 44 U/L    Alkaline Phosphatase 112 55 - 135 U/L   POCT SvO2 AM-Draw    Collection Time: 09/05/22  5:01 AM   Result Value Ref Range    POC SVO2 68 %   ISTAT PROCEDURE    Collection Time: 09/05/22  5:01 AM   Result Value Ref Range    POC PH 7.373 7.35 - 7.45    POC PCO2 47.1 (H) 35 - 45 mmHg    POC PO2 37 (L) 40 - 60 mmHg    POC HCO3 27.4 24 - 28 mmol/L    POC BE 2 -2 to 2 mmol/L    POC SATURATED O2 68 (L) 95 - 100 %    POC TCO2 29 24 - 29 mmol/L    Sample VENOUS     Site Other     Allens Test N/A     DelSys Room Air    Prealbumin    Collection Time: 09/05/22  5:17 AM   Result Value Ref Range    Prealbumin 30 20 - 43 mg/dL   POCT glucose    Collection Time: 09/05/22  8:04 AM   Result Value Ref Range    POCT Glucose 202 (H) 70 - 110 mg/dL   POCT glucose    Collection Time: 09/05/22 12:10 PM   Result Value Ref Range    POCT Glucose 328 (H) 70 - 110 mg/dL   POCT glucose    Collection Time: 09/05/22  4:06 PM   Result Value Ref Range    POCT Glucose 168 (H) 70 - 110 mg/dL   POCT glucose    Collection Time: 09/05/22  9:05 PM   Result Value Ref Range    POCT Glucose 120 (H) 70 - 110 mg/dL   Basic metabolic panel    Collection Time: 09/06/22  5:38 AM   Result Value Ref Range    Sodium 135 (L) 136 - 145 mmol/L    Potassium 4.2 3.5 - 5.1 mmol/L    Chloride 103 95 - 110 mmol/L    CO2 24 23 - 29 mmol/L    Glucose 176 (H) 70 - 110 mg/dL    BUN 12 6 - 20 mg/dL    Creatinine 1.2 0.5 - 1.4 mg/dL    Calcium 9.7 8.7 - 10.5 mg/dL    Anion Gap 8 8 - 16 mmol/L    eGFR >60.0 >60 mL/min/1.73 m^2   Magnesium    Collection Time: 09/06/22  5:38 AM   Result Value Ref Range    Magnesium 2.1 1.6 - 2.6 mg/dL   APTT    Collection Time: 09/06/22  5:38 AM   Result Value Ref Range    aPTT 32.8 (H) 21.0 - 32.0 sec    Protime-INR    Collection Time: 09/06/22  5:38 AM   Result Value Ref Range    Prothrombin Time 21.5 (H) 9.0 - 12.5 sec    INR 2.2 (H) 0.8 - 1.2   Lactate dehydrogenase    Collection Time: 09/06/22  5:38 AM   Result Value Ref Range     110 - 260 U/L   POCT glucose    Collection Time: 09/06/22  7:41 AM   Result Value Ref Range    POCT Glucose 232 (H) 70 - 110 mg/dL   ISTAT PROCEDURE    Collection Time: 09/06/22  9:05 AM   Result Value Ref Range    POC PH 7.330 (L) 7.35 - 7.45    POC PCO2 54.8 (H) 35 - 45 mmHg    POC PO2 28 (L) 40 - 60 mmHg    POC HCO3 28.9 (H) 24 - 28 mmol/L    POC BE 3 -2 to 2 mmol/L    POC SATURATED O2 47 (L) 95 - 100 %    POC TCO2 31 (H) 24 - 29 mmol/L    Sample VENOUS     Site Ehsan/UAC     Allens Test N/A     DelSys Room Air     Mode SPONT    CBC auto differential    Collection Time: 09/06/22  9:19 AM   Result Value Ref Range    WBC 8.90 3.90 - 12.70 K/uL    RBC 4.64 4.60 - 6.20 M/uL    Hemoglobin 12.0 (L) 14.0 - 18.0 g/dL    Hematocrit 37.1 (L) 40.0 - 54.0 %    MCV 80 (L) 82 - 98 fL    MCH 25.9 (L) 27.0 - 31.0 pg    MCHC 32.3 32.0 - 36.0 g/dL    RDW 21.7 (H) 11.5 - 14.5 %    Platelets 352 150 - 450 K/uL    MPV 9.5 9.2 - 12.9 fL    Immature Granulocytes 0.1 0.0 - 0.5 %    Gran # (ANC) 5.4 1.8 - 7.7 K/uL    Immature Grans (Abs) 0.01 0.00 - 0.04 K/uL    Lymph # 2.6 1.0 - 4.8 K/uL    Mono # 0.6 0.3 - 1.0 K/uL    Eos # 0.2 0.0 - 0.5 K/uL    Baso # 0.04 0.00 - 0.20 K/uL    nRBC 0 0 /100 WBC    Gran % 60.9 38.0 - 73.0 %    Lymph % 29.6 18.0 - 48.0 %    Mono % 7.0 4.0 - 15.0 %    Eosinophil % 2.0 0.0 - 8.0 %    Basophil % 0.4 0.0 - 1.9 %    Differential Method Automated    POCT glucose    Collection Time: 09/06/22 11:43 AM   Result Value Ref Range    POCT Glucose 152 (H) 70 - 110 mg/dL         Follow Up:  Follow up in about 4 months (around 1/20/2023) for Follow-up.         This note was created with the assistance of a voice recognition software or phone dictation. There may be transcription  errors as a result of using this technology however minimal. Effort has been made to assure accuracy of transcription but any obvious errors or omissions should be clarified with the author of the document

## 2022-09-21 ENCOUNTER — ANTI-COAG VISIT (OUTPATIENT)
Dept: CARDIOLOGY | Facility: CLINIC | Age: 37
End: 2022-09-21
Payer: MEDICAID

## 2022-09-21 ENCOUNTER — TELEPHONE (OUTPATIENT)
Dept: TRANSPLANT | Facility: CLINIC | Age: 37
End: 2022-09-21
Payer: MEDICAID

## 2022-09-21 DIAGNOSIS — Z95.811 LVAD (LEFT VENTRICULAR ASSIST DEVICE) PRESENT: Primary | ICD-10-CM

## 2022-09-21 PROCEDURE — 93793 ANTICOAG MGMT PT WARFARIN: CPT | Mod: ,,,

## 2022-09-21 PROCEDURE — 93793 PR ANTICOAGULANT MGMT FOR PT TAKING WARFARIN: ICD-10-PCS | Mod: ,,,

## 2022-09-21 NOTE — TELEPHONE ENCOUNTER
VAD emergency community letters sent to proper care providers provided on [facility] patient emergency contact information info sheet by patient.

## 2022-09-21 NOTE — PROGRESS NOTES
INR not at goal. Medications, chart, and patient findings reviewed. See calendar for adjustments to dose and follow up plan.  Recheck INR 9/26

## 2022-09-21 NOTE — PROGRESS NOTES
Darlyn Wright questioned and confirmed dose . Twan lettuce intake 9/19 and appetite is well . Denies any other changes.

## 2022-09-22 ENCOUNTER — TELEPHONE (OUTPATIENT)
Dept: TRANSPLANT | Facility: CLINIC | Age: 37
End: 2022-09-22
Payer: MEDICAID

## 2022-09-22 ENCOUNTER — DOCUMENTATION ONLY (OUTPATIENT)
Dept: TRANSPLANT | Facility: CLINIC | Age: 37
End: 2022-09-22
Payer: MEDICAID

## 2022-09-22 NOTE — PROGRESS NOTES
Weekly labs received and reviewed. All within patients baseline. No changes at this time. Will continue to monitor closely.

## 2022-09-27 ENCOUNTER — ANTI-COAG VISIT (OUTPATIENT)
Dept: CARDIOLOGY | Facility: CLINIC | Age: 37
End: 2022-09-27
Payer: MEDICAID

## 2022-09-27 DIAGNOSIS — Z95.811 LVAD (LEFT VENTRICULAR ASSIST DEVICE) PRESENT: Primary | ICD-10-CM

## 2022-09-27 LAB — INR PPP: 1.6

## 2022-09-27 PROCEDURE — 93793 PR ANTICOAGULANT MGMT FOR PT TAKING WARFARIN: ICD-10-PCS | Mod: ,,,

## 2022-09-27 PROCEDURE — 93793 ANTICOAG MGMT PT WARFARIN: CPT | Mod: ,,,

## 2022-10-03 ENCOUNTER — TELEPHONE (OUTPATIENT)
Dept: TRANSPLANT | Facility: CLINIC | Age: 37
End: 2022-10-03
Payer: MEDICAID

## 2022-10-03 NOTE — TELEPHONE ENCOUNTER
Contacted [facility] to obtain lab results from last week    Spoke with Lara    They were not drawn  on last week only pt/inr drawn    Stated patient is due to come to office today and they are listed in the orders to be drawn on today but will not results until tomorrow. Fax number confirmed to fax results    This information will be relayed to [facility] coordinator.

## 2022-10-10 ENCOUNTER — TELEPHONE (OUTPATIENT)
Dept: TRANSPLANT | Facility: CLINIC | Age: 37
End: 2022-10-10
Payer: MEDICAID

## 2022-10-10 NOTE — TELEPHONE ENCOUNTER
Nena with [place] [employer] called to confirm patient address.  She said the address we have is not pulling up for her.  I called pt to confirm, no answer.

## 2022-10-11 ENCOUNTER — TELEPHONE (OUTPATIENT)
Dept: TRANSPLANT | Facility: CLINIC | Age: 37
End: 2022-10-11
Payer: MEDICAID

## 2022-10-11 NOTE — TELEPHONE ENCOUNTER
Robyn calling asking for melendez anchors, they are out. I explained we were going to give him some when they came to clinic last week and asked her to use tape until they can get to clinic.  I explained Leni is working on rescheduling appts now. Robyn verbalized understanding and agreement.

## 2022-10-11 NOTE — PROGRESS NOTES
10/11/22  Wife called and rescheduled 10/06 missed lab to 10/13/22--will be going to [facility] at 3:30 pm

## 2022-10-11 NOTE — TELEPHONE ENCOUNTER
Called [facility] to obtain patient lab results    Was transferred 5x finally leaving a message     Will try again to obtain weekly lab results.

## 2022-10-13 ENCOUNTER — TELEPHONE (OUTPATIENT)
Dept: TRANSPLANT | Facility: CLINIC | Age: 37
End: 2022-10-13
Payer: MEDICAID

## 2022-10-13 NOTE — TELEPHONE ENCOUNTER
Contacted   [facility] ph [phone]     To obtain lab results    Patient did not visit facility last week  He is due to visit today, will attempt to obtain labs at that time    Spoke with Lara

## 2022-10-14 ENCOUNTER — DOCUMENTATION ONLY (OUTPATIENT)
Dept: TRANSPLANT | Facility: CLINIC | Age: 37
End: 2022-10-14
Payer: MEDICAID

## 2022-10-14 NOTE — PROGRESS NOTES
10/14/2022   Kevan Queen  [address] [facility]        OUTPATIENT RIGHT HEART CATHETERIZATION INSTRUCTIONS     You have been scheduled for a procedure in the catheterization lab on 10/20/22     Please report to the Cardiology Waiting Area on the Third floor of the hospital and check in at 9:30 am    You will then be taken to the SSCU (Short Stay Cardiac Unit) and prepared for your procedure. Please be aware that this is not the time of your procedure but the time you are to arrive. The procedures are scheduled on an hourly basis; however, emergency cases take precedence over all other cases.      You may eat a light meal before your procedure.    You may take your regular morning medications with water. If there are any medications that you should not take you will be instructed to hold them that morning.   If you are on Pradaxa, Eliquis or Coumadin , you can hold them 3 days prior to your procedure.  CALL US if you are on blood thinners for instructions. [phone]  Do not stop your Aspirin, Plavix, Effient, or Brilinta.    The procedure will take 30 minutes to perform. After the procedure, you will return to SSCU on the third floor of the hospital. Your family may remain in the room with you during this time.     You will be monitored for bleeding from the puncture site.  Your dressing may be removed the next day and dressed with a Band-Aid.  You may be able to be discharged home that same afternoon if there is someone to drive you home and there were no complications.     You may need to be admitted to the hospital after your procedure, so pack an overnight bag. Your doctor will determine, based on your progress, the date and time of your discharge. The results of your procedure will be discussed with you before you are discharged. Any further testing or procedures will be scheduled for you either before you leave or you will be called with these appointments.      If you should have any questions,  concerns, or need to change the date of your procedure, please call  Heart Transplant office and ask for your nurse. [phone]    Special Instructions:     THE ABOVE INSTRUCTIONS WERE GIVEN TO THE PATIENT VERBALLY AND THEY VERBALIZED UNDERSTANDING.  THEY DO NOT REQUIRE ANY SPECIAL NEEDS AND DO NOT HAVE ANY LEARNING BARRIERS.     Directions for Reporting to Cardiology Waiting Area in the Hospital  If you park in the Parking Garage:  Take elevators to the1st floor of the parking garage.  Continue past the gift shop, coffee shop, and piano.  Take a right and go to the gold elevators. (Elevator B)  Take the elevator to the 3rd floor.  Follow the arrow on the sign on the wall that says Cath Lab Registration/EP Lab Registration.  Follow the long hallway all the way around until you come to a big open area.  This is the registration area.  Check in at Reception Desk.     OR     If family is dropping you off:  Have them drop you off at the front of the Hospital under the green overhang.  Enter through the doors and take a right.  Take the E elevators to the 3rd floor Cardiology Waiting Area.  Check in at the Reception Desk in the waiting room.

## 2022-10-18 ENCOUNTER — PATIENT MESSAGE (OUTPATIENT)
Dept: CARDIOLOGY | Facility: CLINIC | Age: 37
End: 2022-10-18
Payer: MEDICAID

## 2022-10-18 NOTE — PROGRESS NOTES
Per Teri at [facility]- Pt has not come to lab since 9/27. I called pt to reschedule missed labs. Unable to leave voicemail on pt's phone. Left voicemail on Ms. Wright (EC) to call back to reschedule.

## 2022-10-19 DIAGNOSIS — Z00.6 EXAMINATION OF PARTICIPANT IN CLINICAL TRIAL: ICD-10-CM

## 2022-10-19 DIAGNOSIS — Z95.811 LVAD (LEFT VENTRICULAR ASSIST DEVICE) PRESENT: Primary | ICD-10-CM

## 2022-10-20 LAB
EXT ALBUMIN: 4.7
EXT ALT: 37
EXT AST: 39
EXT BILIRUBIN TOTAL: 0.3
EXT BNP: 69
EXT BUN: 14
EXT CALCIUM: 9.9
EXT CHLORIDE: 101
EXT CREATININE: 1.26 MG/DL
EXT GLUCOSE: 159
EXT HEMATOCRIT: 40.2
EXT HEMOGLOBIN: 13.5
EXT MAGNESIUM: 2
EXT PLATELETS: 265
EXT POTASSIUM: 4.3
EXT PROTEIN TOTAL: 8.4
EXT SODIUM: 139 MMOL/L
EXT WBC: 10.2

## 2022-10-20 NOTE — TELEPHONE ENCOUNTER
Good afternoon,   I'm sorry to hear about your transportation difficulties and I completely understand. I would be happy to rescheduled your INR lab work. Any other appointments you have to reschedule you would have to contact [facility] department. What day would you like me to reschedule and what would be the most convenient lab for you?    Best Regards,  Jodee

## 2022-10-21 ENCOUNTER — DOCUMENTATION ONLY (OUTPATIENT)
Dept: TRANSPLANT | Facility: CLINIC | Age: 37
End: 2022-10-21
Payer: MEDICAID

## 2022-10-21 NOTE — PROGRESS NOTES
10/21/2022   Kevan Queen  [address] [facility]        OUTPATIENT RIGHT HEART CATHETERIZATION INSTRUCTIONS     You have been scheduled for a procedure in the catheterization lab on 10/31/2022      Please report to the Cardiology Waiting Area on the Third floor of the hospital and check in at 1:30 pm    You will then be taken to the SSCU (Short Stay Cardiac Unit) and prepared for your procedure. Please be aware that this is not the time of your procedure but the time you are to arrive. The procedures are scheduled on an hourly basis; however, emergency cases take precedence over all other cases.      You may eat a light meal before your procedure.    You may take your regular morning medications with water. If there are any medications that you should not take you will be instructed to hold them that morning.   If you are on Pradaxa, Eliquis or Coumadin , you can hold them 3 days prior to your procedure.  CALL US if you are on blood thinners for instructions. [phone]  Do not stop your Aspirin, Plavix, Effient, or Brilinta.    The procedure will take 30 minutes to perform. After the procedure, you will return to SSCU on the third floor of the hospital. Your family may remain in the room with you during this time.     You will be monitored for bleeding from the puncture site.  Your dressing may be removed the next day and dressed with a Band-Aid.  You may be able to be discharged home that same afternoon if there is someone to drive you home and there were no complications.     You may need to be admitted to the hospital after your procedure, so pack an overnight bag. Your doctor will determine, based on your progress, the date and time of your discharge. The results of your procedure will be discussed with you before you are discharged. Any further testing or procedures will be scheduled for you either before you leave or you will be called with these appointments.      If you should have any questions,  concerns, or need to change the date of your procedure, please call  Heart Transplant office and ask for your nurse. [phone]    Special Instructions:     THE ABOVE INSTRUCTIONS WERE GIVEN TO THE PATIENT VERBALLY AND THEY VERBALIZED UNDERSTANDING.  THEY DO NOT REQUIRE ANY SPECIAL NEEDS AND DO NOT HAVE ANY LEARNING BARRIERS.     Directions for Reporting to Cardiology Waiting Area in the Hospital  If you park in the Parking Garage:  Take elevators to the1st floor of the parking garage.  Continue past the gift shop, coffee shop, and piano.  Take a right and go to the gold elevators. (Elevator B)  Take the elevator to the 3rd floor.  Follow the arrow on the sign on the wall that says Cath Lab Registration/EP Lab Registration.  Follow the long hallway all the way around until you come to a big open area.  This is the registration area.  Check in at Reception Desk.     OR     If family is dropping you off:  Have them drop you off at the front of the Hospital under the green overhang.  Enter through the doors and take a right.  Take the E elevators to the 3rd floor Cardiology Waiting Area.  Check in at the Reception Desk in the waiting room.

## 2022-10-24 ENCOUNTER — TELEPHONE (OUTPATIENT)
Dept: TRANSPLANT | Facility: CLINIC | Age: 37
End: 2022-10-24
Payer: MEDICAID

## 2022-10-26 ENCOUNTER — TELEPHONE (OUTPATIENT)
Dept: RESEARCH | Facility: HOSPITAL | Age: 37
End: 2022-10-26
Payer: MEDICAID

## 2022-10-26 NOTE — TELEPHONE ENCOUNTER
Study Title:  Antiplatelet Removal and HemocompatIbility EventS with the HeartMate 3 Pump   Protocol: [id number]  IRB #/Approval Date: [id number]  Sponsor: [employer]  : Dr. Luis F Paige     Reached out to Mrs. Dumont, Kevan's mother,  and she asked me to call Kevan's number. Then spoke with Robyn, Kevan's wife, to inquire about correct spelling of street name as Raheel was having a difficulty finding street name to set up ride. She gave me correct spelling and said they might have their car today and so the ride might not be needed. She will keep in contact with me regarding availability of car to get here for appointment.

## 2022-10-27 ENCOUNTER — SOCIAL WORK (OUTPATIENT)
Dept: TRANSPLANT | Facility: CLINIC | Age: 37
End: 2022-10-27
Payer: MEDICAID

## 2022-10-27 LAB — INR PPP: 1.3

## 2022-10-27 NOTE — PROGRESS NOTES
Received call from pt's wife stating they will have their car back and will be able to come to their appt on Monday. TREY informed Tracy Rock in [place] who had arranged a Lyft ride for the pt. No further needs indicated by spouse. SW remains available.

## 2022-10-28 NOTE — PROGRESS NOTES
Patient unreachable since 9/27 for INR f/u. Pending RHC 10/31 with INR same day. [facility] aware.      set-up required/1 person assist/verbal cues

## 2022-10-31 ENCOUNTER — HOSPITAL ENCOUNTER (OUTPATIENT)
Facility: HOSPITAL | Age: 37
Discharge: HOME | End: 2022-10-31
Attending: INTERNAL MEDICINE | Admitting: INTERNAL MEDICINE
Payer: MEDICAID

## 2022-10-31 ENCOUNTER — OFFICE VISIT (OUTPATIENT)
Dept: TRANSPLANT | Facility: CLINIC | Age: 37
End: 2022-10-31
Payer: MEDICAID

## 2022-10-31 ENCOUNTER — HOSPITAL ENCOUNTER (OUTPATIENT)
Dept: CARDIOLOGY | Facility: CLINIC | Age: 37
Discharge: HOME | End: 2022-10-31
Payer: MEDICAID

## 2022-10-31 ENCOUNTER — RESEARCH ENCOUNTER (OUTPATIENT)
Dept: RESEARCH | Facility: HOSPITAL | Age: 37
End: 2022-10-31
Payer: MEDICAID

## 2022-10-31 ENCOUNTER — HOSPITAL ENCOUNTER (OUTPATIENT)
Dept: RADIOLOGY | Facility: HOSPITAL | Age: 37
Discharge: HOME | End: 2022-10-31
Attending: INTERNAL MEDICINE
Payer: MEDICAID

## 2022-10-31 ENCOUNTER — CLINICAL SUPPORT (OUTPATIENT)
Dept: TRANSPLANT | Facility: CLINIC | Age: 37
End: 2022-10-31
Payer: MEDICAID

## 2022-10-31 ENCOUNTER — ANTI-COAG VISIT (OUTPATIENT)
Dept: CARDIOLOGY | Facility: CLINIC | Age: 37
End: 2022-10-31
Payer: MEDICAID

## 2022-10-31 VITALS
HEIGHT: 75 IN | OXYGEN SATURATION: 97 % | WEIGHT: 205 LBS | HEART RATE: 93 BPM | BODY MASS INDEX: 25.49 KG/M2 | TEMPERATURE: 98 F | SYSTOLIC BLOOD PRESSURE: 98 MMHG | RESPIRATION RATE: 18 BRPM

## 2022-10-31 VITALS — SYSTOLIC BLOOD PRESSURE: 90 MMHG | WEIGHT: 205 LBS | HEIGHT: 75 IN | TEMPERATURE: 99 F | BODY MASS INDEX: 25.49 KG/M2

## 2022-10-31 DIAGNOSIS — Z95.811 LVAD (LEFT VENTRICULAR ASSIST DEVICE) PRESENT: Primary | ICD-10-CM

## 2022-10-31 DIAGNOSIS — R56.9 SEIZURE-LIKE ACTIVITY: ICD-10-CM

## 2022-10-31 DIAGNOSIS — Z79.4 TYPE 2 DIABETES MELLITUS WITH HYPERGLYCEMIA, WITH LONG-TERM CURRENT USE OF INSULIN: ICD-10-CM

## 2022-10-31 DIAGNOSIS — I50.43 ACUTE ON CHRONIC COMBINED SYSTOLIC AND DIASTOLIC HEART FAILURE: ICD-10-CM

## 2022-10-31 DIAGNOSIS — Z95.811 LVAD (LEFT VENTRICULAR ASSIST DEVICE) PRESENT: ICD-10-CM

## 2022-10-31 DIAGNOSIS — Z95.810 ICD (IMPLANTABLE CARDIOVERTER-DEFIBRILLATOR) IN PLACE: ICD-10-CM

## 2022-10-31 DIAGNOSIS — I50.42 CHRONIC COMBINED SYSTOLIC AND DIASTOLIC HEART FAILURE: ICD-10-CM

## 2022-10-31 DIAGNOSIS — I50.814 RIGHT HEART FAILURE SECONDARY TO LEFT HEART FAILURE: ICD-10-CM

## 2022-10-31 DIAGNOSIS — Z00.6 EXAMINATION OF PARTICIPANT IN CLINICAL TRIAL: ICD-10-CM

## 2022-10-31 DIAGNOSIS — E11.65 TYPE 2 DIABETES MELLITUS WITH HYPERGLYCEMIA, WITH LONG-TERM CURRENT USE OF INSULIN: ICD-10-CM

## 2022-10-31 PROCEDURE — 3051F HG A1C>EQUAL 7.0%<8.0%: CPT | Mod: CPTII,,, | Performed by: INTERNAL MEDICINE

## 2022-10-31 PROCEDURE — C1751 CATH, INF, PER/CENT/MIDLINE: HCPCS | Performed by: INTERNAL MEDICINE

## 2022-10-31 PROCEDURE — 3060F POS MICROALBUMINURIA REV: CPT | Mod: CPTII,,, | Performed by: INTERNAL MEDICINE

## 2022-10-31 PROCEDURE — 93451 RIGHT HEART CATH: CPT | Performed by: INTERNAL MEDICINE

## 2022-10-31 PROCEDURE — 3051F PR MOST RECENT HEMOGLOBIN A1C LEVEL 7.0 - < 8.0%: ICD-10-PCS | Mod: CPTII,,, | Performed by: INTERNAL MEDICINE

## 2022-10-31 PROCEDURE — 99215 OFFICE O/P EST HI 40 MIN: CPT | Mod: S$PBB,25,, | Performed by: INTERNAL MEDICINE

## 2022-10-31 PROCEDURE — 3066F NEPHROPATHY DOC TX: CPT | Mod: CPTII,,, | Performed by: INTERNAL MEDICINE

## 2022-10-31 PROCEDURE — 25000003 PHARM REV CODE 250: Performed by: INTERNAL MEDICINE

## 2022-10-31 PROCEDURE — 99999 PR PBB SHADOW E&M-EST. PATIENT-LVL III: CPT | Mod: PBBFAC,,, | Performed by: INTERNAL MEDICINE

## 2022-10-31 PROCEDURE — 71046 X-RAY EXAM CHEST 2 VIEWS: CPT | Mod: TC,FY

## 2022-10-31 PROCEDURE — 93010 EKG 12-LEAD: ICD-10-PCS | Mod: S$PBB,,, | Performed by: INTERNAL MEDICINE

## 2022-10-31 PROCEDURE — 71046 XR CHEST PA AND LATERAL: ICD-10-PCS | Mod: 26,,, | Performed by: RADIOLOGY

## 2022-10-31 PROCEDURE — 4010F PR ACE/ARB THEARPY RXD/TAKEN: ICD-10-PCS | Mod: CPTII,,, | Performed by: INTERNAL MEDICINE

## 2022-10-31 PROCEDURE — 4010F ACE/ARB THERAPY RXD/TAKEN: CPT | Mod: CPTII,,, | Performed by: INTERNAL MEDICINE

## 2022-10-31 PROCEDURE — C1894 INTRO/SHEATH, NON-LASER: HCPCS | Performed by: INTERNAL MEDICINE

## 2022-10-31 PROCEDURE — 93451 PR RIGHT HEART CATH O2 SATURATION & CARDIAC OUTPUT: ICD-10-PCS | Mod: 26,,, | Performed by: INTERNAL MEDICINE

## 2022-10-31 PROCEDURE — 3060F PR POS MICROALBUMINURIA RESULT DOCUMENTED/REVIEW: ICD-10-PCS | Mod: CPTII,,, | Performed by: INTERNAL MEDICINE

## 2022-10-31 PROCEDURE — 71046 X-RAY EXAM CHEST 2 VIEWS: CPT | Mod: 26,,, | Performed by: RADIOLOGY

## 2022-10-31 PROCEDURE — 93451 RIGHT HEART CATH: CPT | Mod: 26,,, | Performed by: INTERNAL MEDICINE

## 2022-10-31 PROCEDURE — 93005 ELECTROCARDIOGRAM TRACING: CPT | Mod: PBBFAC | Performed by: INTERNAL MEDICINE

## 2022-10-31 PROCEDURE — 99999 PR PBB SHADOW E&M-EST. PATIENT-LVL III: ICD-10-PCS | Mod: PBBFAC,,, | Performed by: INTERNAL MEDICINE

## 2022-10-31 PROCEDURE — 93010 ELECTROCARDIOGRAM REPORT: CPT | Mod: S$PBB,,, | Performed by: INTERNAL MEDICINE

## 2022-10-31 PROCEDURE — 3066F PR DOCUMENTATION OF TREATMENT FOR NEPHROPATHY: ICD-10-PCS | Mod: CPTII,,, | Performed by: INTERNAL MEDICINE

## 2022-10-31 PROCEDURE — 99215 PR OFFICE/OUTPT VISIT, EST, LEVL V, 40-54 MIN: ICD-10-PCS | Mod: S$PBB,25,, | Performed by: INTERNAL MEDICINE

## 2022-10-31 PROCEDURE — 99213 OFFICE O/P EST LOW 20 MIN: CPT | Mod: PBBFAC,25 | Performed by: INTERNAL MEDICINE

## 2022-10-31 RX ORDER — LIDOCAINE HYDROCHLORIDE 10 MG/ML
INJECTION INFILTRATION; PERINEURAL
Status: DISCONTINUED | OUTPATIENT
Start: 2022-10-31 | End: 2022-10-31 | Stop reason: HOSPADM

## 2022-10-31 NOTE — PLAN OF CARE
Received report from Miguelina. Patient s/p RHC, AAOx3. VSS, no c/o pain or discomfort at this time, resp even and unlabored. Gauze/tegaderm dressing to R neck is CDI. No active bleeding. No hematoma noted. Post procedure protocol reviewed with patient and patient's family. Understanding verbalized. Family members at bedside. Nurse call bell within reach. Will continue to monitor per post procedure protocol.

## 2022-10-31 NOTE — Clinical Note
The PA catheter was repositioned to the main pulmonary artery. Hemodynamics were performed. O2 saturation was measured at 54%. C.O - 3.55  C.I - 1.6

## 2022-10-31 NOTE — BRIEF OP NOTE
Patient tolerated the procedure well. Please see complete RHC procedure note for full details and results. Stable to return from cath lab to their hospital room.  Procedure: Right heart catheterization   Procedure date: 10/31/22    Discharge date: 10/31/22  Estimated blood loss: less than 10 cc  Complications: none  Condition at discharge: stable  Discharge instructions: no heavy lifting greater than 5 lbs and no bearing down/coughing without holding pressure over incision site.   Activity: as tolerated  Diet: as tolerated  Dispo: home

## 2022-10-31 NOTE — H&P
Subjective:   Patient ID:  Kevan Queen is a 37 y.o. male who presents for LVAD followup visit.    Implant Date:6/29/2022     Heartmate 3 RPM 5100     INR goal: 2-3   Bridge with Heparin   Antiplatelets: Alon trial   Inotropes: Milrinone @ 0.25 mcg/kg/min     TXP JOY INTERROGATIONS 9/1/2022   Type HeartMate3   Flow 3.5   Speed 5100   PI 7.4   Power (Serna) 3.7   LSL 4700   Pulsatility No Pulse       HPI  Mr. 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 He also completed a course of IV Abx for staph epi. He was weaned off  but he had to restarted due to RVF. He was eventually transitioned to milrinone (secondary to  shortage) now on 0.25 mcg/kg/min.  patient is here for RHC to re-evaluate his RV issues and consider weaning off of inotrope. Seen in clinic earlier today. Feels okay. No recent syncope or seizure.     Review of Systems   Constitutional: Negative. Negative for chills, decreased appetite, diaphoresis, fever, malaise/fatigue, night sweats, weight gain and weight loss.   Eyes: Negative.    Cardiovascular:  Positive for dyspnea on exertion and syncope. Negative for chest pain, claudication, cyanosis, irregular heartbeat, leg swelling, near-syncope, orthopnea, palpitations and paroxysmal nocturnal dyspnea.   Respiratory:  Negative for cough, hemoptysis and shortness of breath.    Endocrine: Negative.    Hematologic/Lymphatic: Negative.    Skin:  Negative for color change, dry skin and nail changes.   Musculoskeletal: Negative.    Gastrointestinal: Negative.    Genitourinary: Negative.    Neurological:   "Negative for weakness.     Objective:         Physical Exam  Vitals reviewed.   Constitutional:       Appearance: He is well-developed.      Comments: There were no vitals taken for this visit.     HENT:      Head: Normocephalic.   Neck:      Vascular: No carotid bruit or JVD.   Cardiovascular:      Heart sounds: No murmur heard.     Comments: Smooth VAD hum. DLES is "1"  Pulmonary:      Effort: Pulmonary effort is normal.      Breath sounds: Normal breath sounds.   Abdominal:      General: Bowel sounds are normal.      Palpations: Abdomen is soft.   Skin:     General: Skin is warm.   Neurological:      Mental Status: He is alert.     Lab Results   Component Value Date     (H) 10/31/2022     10/31/2022    K 3.8 10/31/2022    MG 1.7 10/31/2022     10/31/2022    CO2 23 10/31/2022    BUN 13 10/31/2022    CREATININE 1.3 10/31/2022     (H) 10/31/2022    HGBA1C 7.4 (H) 09/01/2022    AST 37 10/31/2022    ALT 34 10/31/2022    ALBUMIN 4.0 10/31/2022    PROT 8.4 10/31/2022    BILITOT 0.5 10/31/2022    CHOL 261 (H) 04/18/2022    HDL 54 04/18/2022    LDLCALC Invalid, Trig>400.0 04/18/2022    TRIG 496 (H) 04/18/2022       Magnesium   Date Value Ref Range Status   10/31/2022 1.7 1.6 - 2.6 mg/dL Final       Lab Results   Component Value Date    WBC 9.21 10/31/2022    HGB 13.9 (L) 10/31/2022    HCT 42.0 10/31/2022    MCV 84 10/31/2022     10/31/2022       Lab Results   Component Value Date    INR 1.4 (H) 10/31/2022    INR 1.3 10/27/2022    INR 1.6 09/26/2022    PROTIME 22.7 (H) 08/30/2022    PROTIME 20.6 (H) 08/15/2022    PROTIME 14.6 (H) 09/23/2021       BNP   Date Value Ref Range Status   10/31/2022 198 (H) 0 - 99 pg/mL Final     Comment:     Values of less than 100 pg/ml are consistent with non-CHF populations.   09/05/2022 90 0 - 99 pg/mL Final     Comment:     Values of less than 100 pg/ml are consistent with non-CHF populations.   09/02/2022 43 0 - 99 pg/mL Final     Comment:     Values of " less than 100 pg/ml are consistent with non-CHF populations.       LD   Date Value Ref Range Status   10/31/2022 281 (H) 110 - 260 U/L Final     Comment:     Results are increased in hemolyzed samples.   09/06/2022 213 110 - 260 U/L Final     Comment:     Results are increased in hemolyzed samples.   09/05/2022 223 110 - 260 U/L Final     Comment:     Results are increased in hemolyzed samples.         Assessment:      1. LVAD (left ventricular assist device) present    2. Loss of consciousness    3. Chronic combined systolic and diastolic heart failure    4. RVF (right ventricular failure)    5. Cardiomyopathy, nonischemic    6. Stage 3a chronic kidney disease    7. Diabetes mellitus due to underlying condition, uncontrolled, with hyperglycemia        Plan:   Patient returns for RHC to evaluate filling pressures, right heart function. Possible admission afterwards to wean milrinone off.  I have explained the risks, benefits, and alternatives of the procedure in detail.  The patient voices understanding and all questions have been answered.  The patient agrees to proceed as planned.

## 2022-10-31 NOTE — PATIENT INSTRUCTIONS

## 2022-10-31 NOTE — PROGRESS NOTES
Subjective:   Patient ID:  Kevan Queen is a 37 y.o. male who presents for LVAD followup visit.    Implant Date:6/29/2022     Heartmate 3 RPM 5100     INR goal: 2-3   Bridge with Heparin/lovenox   Antiplatelets: Alon trial   Inotropes: Milrinone @ 0.25 mcg/kg/min       TXP JOY INTERROGATIONS 10/31/2022   Type HeartMate3   Flow 3.8   Speed 5100   PI 7.6   Power (Serna) 3.8   LSL 4700   Pulsatility Intermittent pulse       HPI  Mr. 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 He also completed a course of IV Abx for staph epi. He was weaned off  but he had to restarted due to RVF. He was eventually transitioned to milrinone (secondary to  shortage) now on 0.25 mcg/kg/min.  Was admitted last month for syncope vs. seizure event in parking garage morning of admit. Head CT was unremarkable.  Was already taking Keppra 1 g BID, which was started during LVAD admission for recurrent episodes of loss of awareness with L TIRDA on EEG and encephalomalacia on imaging. Recently seen by Dr. Devi who considered EMU admission in October for event characterization; however, patient unable to be admitted to the EMU service given continuous milrinone infusion. Neurology was conaulted and Long term EEG monitoring complete at this time, with no electrographic seizures or typical events recorded during stay. Per Neurolgoy ok to restart Keppra 1 g BID and increase gabapentin to 300 mg TID for neuropathic pain in L hand and foot.  We discontinued tramadol and other medications that lower seizure threshold. We added methocarbamol for  "pain control on top of Tylenol.  Neurology discussed with pt/family regarding driving laws in [place] after seizures.  Today comes for his VASD visit. Doing really well. NYHA class II symptoms. Reports no seizure episodes since he was discharged.  VAD speed is at 5100 rpm. No VAD alarms noted on interrogation occasional PI events . BP is 90 mm of Hg. DLES is a "1.5". INR is subtherapeutic at 1.4. LDh is at baseline. He is scheduled for a RHC today.      Review of Systems   Constitutional: Negative. Negative for chills, decreased appetite, diaphoresis, fever, malaise/fatigue, night sweats, weight gain and weight loss.   Eyes: Negative.    Cardiovascular:  Negative for chest pain, claudication, cyanosis, dyspnea on exertion, irregular heartbeat, leg swelling, near-syncope, orthopnea, palpitations, paroxysmal nocturnal dyspnea and syncope.   Respiratory:  Negative for cough, hemoptysis and shortness of breath.    Endocrine: Negative.    Hematologic/Lymphatic: Negative.    Skin:  Negative for color change, dry skin and nail changes.   Musculoskeletal: Negative.    Gastrointestinal: Negative.    Genitourinary: Negative.    Neurological:  Negative for weakness.     Objective:   Blood pressure (!) 90/0, temperature 98.6 °F (37 °C), temperature source Oral, height 6' 3" (1.905 m), weight 93 kg (205 lb).body mass index is 25.62 kg/m².    Doppler: 90    Physical Exam  Vitals reviewed.   Constitutional:       Appearance: He is well-developed.      Comments: BP (!) 90/0 (BP Location: Left arm, Patient Position: Sitting) Comment (BP Method): doppler  Temp 98.6 °F (37 °C) (Oral)   Ht 6' 3" (1.905 m)   Wt 93 kg (205 lb)   BMI 25.62 kg/m²      HENT:      Head: Normocephalic.   Neck:      Vascular: No carotid bruit or JVD.   Cardiovascular:      Heart sounds: No murmur heard.     Comments: Smooth VAD hum. DLES is "1.5"  Pulmonary:      Effort: Pulmonary effort is normal.      Breath sounds: Normal breath sounds.   Abdominal:      " General: Bowel sounds are normal.      Palpations: Abdomen is soft.   Skin:     General: Skin is warm.   Neurological:      Mental Status: He is alert.       Lab Results   Component Value Date    WBC 9.21 10/31/2022    HGB 13.9 (L) 10/31/2022    HCT 42.0 10/31/2022    MCV 84 10/31/2022     10/31/2022    CHLORIDE 92 (L) 05/20/2022    CO2 23 10/31/2022    CREATININE 1.3 10/31/2022    GLUCOSE 598 (HH) 05/20/2022    CALCIUM 9.6 10/31/2022    ALKALINEPHOS 98 06/02/2022    ALBUMIN 4.0 10/31/2022    AST 37 10/31/2022     (H) 10/31/2022    ALT 34 10/31/2022     (H) 10/31/2022       Lab Results   Component Value Date    INR 1.4 (H) 10/31/2022    INR 1.3 10/27/2022    INR 1.6 09/26/2022    PROTIME 22.7 (H) 08/30/2022    PROTIME 20.6 (H) 08/15/2022    PROTIME 14.6 (H) 09/23/2021       BNP   Date Value Ref Range Status   10/31/2022 198 (H) 0 - 99 pg/mL Final     Comment:     Values of less than 100 pg/ml are consistent with non-CHF populations.   09/05/2022 90 0 - 99 pg/mL Final     Comment:     Values of less than 100 pg/ml are consistent with non-CHF populations.   09/02/2022 43 0 - 99 pg/mL Final     Comment:     Values of less than 100 pg/ml are consistent with non-CHF populations.       LD   Date Value Ref Range Status   10/31/2022 281 (H) 110 - 260 U/L Final     Comment:     Results are increased in hemolyzed samples.   09/06/2022 213 110 - 260 U/L Final     Comment:     Results are increased in hemolyzed samples.   09/05/2022 223 110 - 260 U/L Final     Comment:     Results are increased in hemolyzed samples.         Assessment:      1. LVAD (left ventricular assist device) present    2. Chronic combined systolic and diastolic heart failure    3. Right heart failure secondary to left heart failure-post LVAD surgery    4. Type 2 diabetes mellitus with hyperglycemia, with long-term current use of insulin    5. Seizure-like activity    6. ICD (implantable cardioverter-defibrillator) in place         Plan:   Doing well  INR is subtherapeutic due RHC today. Euvolemic on exam.  VAD interrogation was performed in clinic  Any VAD management kits dispensed today medically necessary  Recommend 2 gram sodium restriction and 1500cc fluid restriction.  Encourage physical activity with graded exercise program.  Requested patient to weigh themselves daily, and to notify us if their weight increases by more than 3 lbs in 1 day or 5 lbs in 1 week.   Additionally, the patient has a patient centered goal of being able to get weaned off Milrinone.  RTC in 1 month     Patient advised that it is recommended that all patients, and their close contacts and household members receive Covid vaccination.     Listed for transplant: No     UNOS Patient Status  Functional Status: 100% - Normal, no complaints, no evidence of disease  Physical Capacity: No Limitations  Working for Income: No  If no, reason not working: Demands of Treatment     Natalya Villatoro MD

## 2022-10-31 NOTE — PROGRESS NOTES
"Date of Implant with Heartmate 3 LVAD: 6/29/22    PATIENT ARRIVED IN CLINIC:  Ambulatory   Accompanied by: wife and kids  Complaints/reason for visit today: routine    Vitals  Temperature, oral:   Temp Readings from Last 1 Encounters:   10/31/22 98.6 °F (37 °C) (Oral)     Blood Pressure:   BP Readings from Last 3 Encounters:   10/31/22 (!) 90/0   09/20/22 103/69   09/06/22 111/79        VAD Interrogation:  TXP JOY INTERROGATIONS 10/31/2022 9/5/2022 9/4/2022   Type HeartMate3 - -   Flow 3.8 - -   Speed 5100 - -   PI 7.6 - -   Power (Serna) 3.8 - -   LSL 4700 - -   Pulsatility Intermittent pulse Pulse Pulse     HCT:   Lab Results   Component Value Date    HCT 42.0 10/31/2022    HCT 29 (L) 07/23/2022       History Log: [id number]  Problems / Issues / Alarms with VAD if any:  1 LFA noted today, zero seconds  VAD Sounds: HM3     VAD Binder With Patient: no   Reviewed VAD Numbers In Binder: no  Enrolled in Care Companion: yes    Equipment:  Emergency Equipment With Patient: yes   Any Equipment Issues: None noted   It is medically necessary to ensure patient has properly functioning equipment and wearables to prevent infection, injury or death to patient.     DLES Assessment:  Appearance Of Driveline: "1.5" with small amount drainage noted.  Not sufficient to culture      Antibiotics: NO  Velour: no  Manual & Visual Inspection Of Driveline: No kinks or tears noted  Stabilization Device In Use: yes, melendez securement device        Patient MyChart Questionnaire: No flowsheet data found.     Assessment:   PAIN: NO  Complaints Of Nausea / Vomiting: None noted    Appearance and Frequency Of Stools: normal and formed without blood & daily  Color Of Urine: clear/yellow  Coping/Depression/Anxiety: coping okay and depressed  Sleep Habits: 7 hrs /night  Sleep Aids: None noted  Showering: No  Activity/Exercise: walking   Driving: Yes. Reminded to pull over should there be an alarm before looking down at controller.    DLES Dressing " Care:   Frequency of Dressing Changes: daily & daily kit  Pt In Need Of Management Kits?:no   It is medically necessary to have VAD management kits in order to prevent infection or to assist in the healing of an infected DLES.    Labs:    Chemistry        Component Value Date/Time     10/31/2022 1228    K 3.8 10/31/2022 1228     10/31/2022 1228    CO2 23 10/31/2022 1228    BUN 13 10/31/2022 1228    CREATININE 1.3 10/31/2022 1228     (H) 10/31/2022 1228        Component Value Date/Time    CALCIUM 9.6 10/31/2022 1228    ALKPHOS 107 10/31/2022 1228    AST 37 10/31/2022 1228    ALT 34 10/31/2022 1228    BILITOT 0.5 10/31/2022 1228    ESTGFRAFRICA >60.0 07/27/2022 1229    EGFRNONAA 54.2 (A) 07/27/2022 1229            Magnesium   Date Value Ref Range Status   10/31/2022 1.7 1.6 - 2.6 mg/dL Final       Lab Results   Component Value Date    WBC 9.21 10/31/2022    HGB 13.9 (L) 10/31/2022    HCT 42.0 10/31/2022    MCV 84 10/31/2022     10/31/2022       Lab Results   Component Value Date    INR 1.4 (H) 10/31/2022    INR 1.3 10/27/2022    INR 1.6 09/26/2022    PROTIME 22.7 (H) 08/30/2022    PROTIME 20.6 (H) 08/15/2022    PROTIME 14.6 (H) 09/23/2021       BNP   Date Value Ref Range Status   10/31/2022 198 (H) 0 - 99 pg/mL Final     Comment:     Values of less than 100 pg/ml are consistent with non-CHF populations.   09/05/2022 90 0 - 99 pg/mL Final     Comment:     Values of less than 100 pg/ml are consistent with non-CHF populations.   09/02/2022 43 0 - 99 pg/mL Final     Comment:     Values of less than 100 pg/ml are consistent with non-CHF populations.       LD   Date Value Ref Range Status   10/31/2022 281 (H) 110 - 260 U/L Final     Comment:     Results are increased in hemolyzed samples.   09/06/2022 213 110 - 260 U/L Final     Comment:     Results are increased in hemolyzed samples.   09/05/2022 223 110 - 260 U/L Final     Comment:     Results are increased in hemolyzed samples.       Labs  reviewed with patient: YES     Medication reconciliation: per MA.  Medication Detail updated today: yes  Coumadin Managed by: [facility]    Education: Reviewed driveline care, emergency procedures, how to change the controller, alarms with patient, as well as discussed how to page the VAD coordinator in case of an emergency.   Covid - 19 education: Reminded patient/caregiver to check temperature daily and call us if it is > 99.0.  Reminded them  to stay 6 feet away from other people, wash hands frequently, don't touch your face and stay home except to get labs, medications, and appts.    Covid Vaccine: Pt informed that we are encouraging all VAD patients to receive the COVID vaccine.  Informed pt that they can take tylenol but should avoid other NSAIDs.      Plans/Needs: Milrinone infusing at 0.25 mcg/kg/min via MELISSA PICC with drsg CDI.  No changes made today. Pt going for RHC after clinic visit.      Hurricane Season: Yes, discussed with patient: With hurricane season approaching, we want to make sure you are fully prepared for any emergency.  Should the National Weather Service or your local authorities recommend a voluntary or mandatory evacuation of your area, The VAD team requires you to evacuate to a safe place.  Remember, when it is a mandatory evacuation, traffic will become an issue for your limited battery power.  Therefore, we strongly urge you to evacuate early.      The VAD team advises you to have the following in place before hurricane season:  Have an evacuation plan in place including places to evacuate, names and phone numbers.  This information is required to be given to the VAD coordinator.  Have your VAD emergency contact numbers with you.  Make sure your prescriptions will not run out by the end of September.  Make sure you have enough medications, including pills, inhalers, patches,     etc. to take, should you be gone for more than 2 weeks.  Make sure ALL of your batteries are  fully charged.  Bring enough dressing change supplies to last for at least 2 weeks.  Bring your VAD binder with you.  Make sure your binder is updated and complete with alarms reference card, patient hand book, emergency contact numbers, daily log sheets, etc.    If you do not have family or friends as an evacuation destination, we recommend evacuating to a safe area.   Do NOT evacuate to [facility].  The VAD team wants to stress the importance of planning for your evacuation in the event of a hurricane.  If you have any LVAD questions or issues, please contact the LVAD coordinator.

## 2022-10-31 NOTE — PROGRESS NOTES
Received report from [facility]. Patient s/p RH, AAOx3. VSS, no c/o pain or discomfort at this time, resp even and unlabored. Gauze/tegaderm dressing to RIJ is CDI. No active bleeding. No hematoma noted. Post procedure protocol reviewed with patient and patient's family. Understanding verbalized. Family members at bedside. Nurse call bell within reach. Will continue to monitor per post procedure protocol.

## 2022-10-31 NOTE — PROGRESS NOTES
Pt DC'd per MD order. Discharge instructions given including activity, wound care, S&S of infections, future appointments, and when to call MD. Medications reviewed including when to take next dose. Patient declined transport and ambulated off unit with family.

## 2022-10-31 NOTE — PROCEDURES
TXP JOY INTERROGATIONS 10/31/2022 9/5/2022 9/4/2022 9/4/2022 9/3/2022 9/3/2022 9/3/2022   Type HeartMate3 - - - - - -   Flow 3.8 - - - - - -   Speed 5100 - - - - - -   PI 7.6 - - - - - -   Power (Serna) 3.8 - - - - - -   LSL 4700 - - - - - -   Pulsatility Intermittent pulse Pulse Pulse Intermittent pulse Intermittent pulse Intermittent pulse Pulse   }

## 2022-10-31 NOTE — LETTER
October 31, 2022        Inderjit Hendricks  [address]  [address]  [place] [address]  Phone: [phone]  Fax: [phone]     Josh Pulido  [address] [address]  Phone: [phone]  Fax: [phone]             [id number]  [address]  [place] [address]  Phone: [phone]   Patient: Kevan Queen   MR Number: [id number]   YOB: 1985   Date of Visit: 10/31/2022       Dear Dr. Inderjit Hendricks, Josh Pulido    Thank you for referring Kevan Queen to me for evaluation. Attached you will find relevant portions of my assessment and plan of care.    If you have questions, please do not hesitate to call me. I look forward to following Kevan Queen along with you.    Sincerely,    Natalya Villatoro MD    Enclosure    If you would like to receive this communication electronically, please contact [email or web address] or [phone] to request [email or web address] access.    [email or web address] is a tool which provides read-only access to select patient information with whom you have a relationship. Its easy to use and provides real time access to review your patients record including encounter summaries, notes, results, and demographic information.    If you feel you have received this communication in error or would no longer like to receive these types of communications, please e-mail [email or web address]

## 2022-10-31 NOTE — DISCHARGE INSTRUCTIONS
AFTER THE PROCEDURE:   -DO NOT DRINK OR EAT ANYTHING UNTIL NO LONGER HOARSE   -You may remove the bandage in 24 hours and wash with soap and water.   -You may shower, but do not soak in a tub for three days.   PRECAUTIONS FOR THE NEXT 24 HOURS:   -If you need to cough, sneeze, have a bowel movement, or bear down, hold pressure over your bandage.   -Do not  anything heavier than a gallon of milk(about 5 pounds)   -Avoid excessive bending over.   SYMPTOMS TO WATCH FOR AND REPORT TO YOUR DOCTOR:   -BLEEDING: hold pressure over the site until bleeding stops. Proceed to Emergency Room by ambulance (do not drive yourself) if unable to stop bleeding. Notify your doctor.   -HEMATOMA(hard bruise under the skin): Krunal around the bruise if one develops. Call your doctor if it increases in size or if you have difficulty talking, swallowing, breathing or anything unusual.   SIGNS OF INFECTION:Fever (temperature over 100.5 F), pus or redness   -RASH   -CHEST PAIN OR SHORTNESS OF BREATH   You may call you coordinator in the Heart Failure/Heart Transplant/Pulmonary Hypertension Clinic at [phone] during normal business hours(Monday through Friday from 8 A.M. to 5 P.M.) After hours, call the Heart Transplant Service doctor on call at [phone].

## 2022-10-31 NOTE — DISCHARGE SUMMARY
[facility] - Cath Lab  Discharge Note  Short Stay    Procedure(s) (LRB):  INSERTION, CATHETER, RIGHT HEART (Right)      OUTCOME: Patient tolerated treatment/procedure well without complication and is now ready for discharge.    DISPOSITION: Home or Self Care    FINAL DIAGNOSIS:  <principal problem not specified>    FOLLOWUP: In clinic    DISCHARGE INSTRUCTIONS:  No discharge procedures on file.     TIME SPENT ON DISCHARGE: 25 minutes

## 2022-11-01 NOTE — RESEARCH
Study Title:  Antiplatelet Removal and HemocompatIbility EventS with the HeartMate 3 Pump   Protocol: [id number]  IRB #/Approval Date: [id number]  Sponsor: [employer]  : Dr. Luis F Paige   Others present: Wife and 2 children    This encounter occurred on Monday 31Oct22.  The subject is present for the Month 3 follow up visit. Subject reports there  are no visits and/or admissions to a non-[facility] facility other than weekly labs. Protocol required data is collected and entered into the EDC, as appropriate, including Pump Parameters, Vital Signs, Labs,  protocol-required send off lab, EQ-5D-5L Survey and R Heart Cath. The subject verbalizes desired continuation in the trial. There are no questions from the subject.    Mr. Queen returned MELARA Bottle # [id number] and re-supplied with MELARA bottle # [id number].

## 2022-11-03 ENCOUNTER — PATIENT MESSAGE (OUTPATIENT)
Dept: TRANSPLANT | Facility: CLINIC | Age: 37
End: 2022-11-03
Payer: MEDICAID

## 2022-11-14 ENCOUNTER — ANTI-COAG VISIT (OUTPATIENT)
Dept: CARDIOLOGY | Facility: CLINIC | Age: 37
End: 2022-11-14
Payer: MEDICAID

## 2022-11-14 DIAGNOSIS — Z95.811 LVAD (LEFT VENTRICULAR ASSIST DEVICE) PRESENT: Primary | ICD-10-CM

## 2022-11-14 LAB — INR PPP: 1.3

## 2022-11-14 PROCEDURE — 93793 ANTICOAG MGMT PT WARFARIN: CPT | Mod: ,,,

## 2022-11-14 PROCEDURE — 93793 PR ANTICOAGULANT MGMT FOR PT TAKING WARFARIN: ICD-10-PCS | Mod: ,,,

## 2022-11-14 NOTE — PROGRESS NOTES
INR not at goal. Result is from 7 days ago, so unable to act upon at this time. Will reschedule patient for a new INR draw date.

## 2022-11-16 ENCOUNTER — ANTI-COAG VISIT (OUTPATIENT)
Dept: CARDIOLOGY | Facility: CLINIC | Age: 37
End: 2022-11-16
Payer: MEDICAID

## 2022-11-16 ENCOUNTER — LAB VISIT (OUTPATIENT)
Dept: LAB | Facility: HOSPITAL | Age: 37
End: 2022-11-16
Attending: INTERNAL MEDICINE
Payer: MEDICAID

## 2022-11-16 ENCOUNTER — TELEPHONE (OUTPATIENT)
Dept: TRANSPLANT | Facility: CLINIC | Age: 37
End: 2022-11-16
Payer: MEDICAID

## 2022-11-16 DIAGNOSIS — Z95.811 LVAD (LEFT VENTRICULAR ASSIST DEVICE) PRESENT: ICD-10-CM

## 2022-11-16 DIAGNOSIS — I50.42 CHRONIC COMBINED SYSTOLIC AND DIASTOLIC CONGESTIVE HEART FAILURE: ICD-10-CM

## 2022-11-16 DIAGNOSIS — Z95.811 LVAD (LEFT VENTRICULAR ASSIST DEVICE) PRESENT: Primary | ICD-10-CM

## 2022-11-16 LAB
ALBUMIN SERPL-MCNC: 4 GM/DL (ref 3.5–5)
ALBUMIN/GLOB SERPL: 0.9 RATIO (ref 1.1–2)
ALP SERPL-CCNC: 111 UNIT/L (ref 40–150)
ALT SERPL-CCNC: 29 UNIT/L (ref 0–55)
AST SERPL-CCNC: 28 UNIT/L (ref 5–34)
BILIRUBIN DIRECT+TOT PNL SERPL-MCNC: 0.4 MG/DL
BNP BLD-MCNC: 93.7 PG/ML
BUN SERPL-MCNC: 14 MG/DL (ref 8.9–20.6)
CALCIUM SERPL-MCNC: 9.7 MG/DL (ref 8.4–10.2)
CHLORIDE SERPL-SCNC: 96 MMOL/L (ref 98–107)
CO2 SERPL-SCNC: 30 MMOL/L (ref 22–29)
CREAT SERPL-MCNC: 1.53 MG/DL (ref 0.73–1.18)
CRP SERPL-MCNC: 2 MG/L
ERYTHROCYTE [DISTWIDTH] IN BLOOD BY AUTOMATED COUNT: 18 % (ref 11.5–17)
GFR SERPLBLD CREATININE-BSD FMLA CKD-EPI: 60 MLS/MIN/1.73/M2
GLOBULIN SER-MCNC: 4.6 GM/DL (ref 2.4–3.5)
GLUCOSE SERPL-MCNC: 291 MG/DL (ref 74–100)
HCT VFR BLD AUTO: 43.9 % (ref 42–52)
HGB BLD-MCNC: 14.5 GM/DL (ref 14–18)
INR BLD: 1.41 (ref 0–1.3)
LDH SERPL-CCNC: 266 U/L (ref 125–220)
MCH RBC QN AUTO: 28.2 PG (ref 27–31)
MCHC RBC AUTO-ENTMCNC: 33 MG/DL (ref 33–36)
MCV RBC AUTO: 85.4 FL (ref 80–94)
NRBC BLD AUTO-RTO: 0 %
PLATELET # BLD AUTO: 300 X10(3)/MCL (ref 130–400)
PMV BLD AUTO: 9.6 FL (ref 7.4–10.4)
POTASSIUM SERPL-SCNC: 4.2 MMOL/L (ref 3.5–5.1)
PROT SERPL-MCNC: 8.6 GM/DL (ref 6.4–8.3)
PROTHROMBIN TIME: 17 SECONDS (ref 12.5–14.5)
RBC # BLD AUTO: 5.14 X10(6)/MCL (ref 4.7–6.1)
SODIUM SERPL-SCNC: 135 MMOL/L (ref 136–145)
WBC # SPEC AUTO: 10.5 X10(3)/MCL (ref 4.5–11.5)

## 2022-11-16 PROCEDURE — 36415 COLL VENOUS BLD VENIPUNCTURE: CPT

## 2022-11-16 PROCEDURE — 86140 C-REACTIVE PROTEIN: CPT

## 2022-11-16 PROCEDURE — 93793 PR ANTICOAGULANT MGMT FOR PT TAKING WARFARIN: ICD-10-PCS | Mod: ,,,

## 2022-11-16 PROCEDURE — 83880 ASSAY OF NATRIURETIC PEPTIDE: CPT

## 2022-11-16 PROCEDURE — 85610 PROTHROMBIN TIME: CPT

## 2022-11-16 PROCEDURE — 93793 ANTICOAG MGMT PT WARFARIN: CPT | Mod: ,,,

## 2022-11-16 PROCEDURE — 80053 COMPREHEN METABOLIC PANEL: CPT

## 2022-11-16 PROCEDURE — 85027 COMPLETE CBC AUTOMATED: CPT

## 2022-11-16 PROCEDURE — 83615 LACTATE (LD) (LDH) ENZYME: CPT

## 2022-11-16 NOTE — PROGRESS NOTES
Darlyn Wright questioned and reports 3 mg daily . Also reports that patient can check labs on Monday and Tuesday due to transportation issues .  Appetite is well , no missed doses, or no other changes reported.

## 2022-11-16 NOTE — TELEPHONE ENCOUNTER
Contacted [facility] to obtain lab results  Patient hasn't visited facility to have lab drawn.    It appears a lab appointment was scheduled at an [facility] lab appointment for 11/16/22 this information will be relayed to VAD coordinator.

## 2022-11-18 ENCOUNTER — TELEPHONE (OUTPATIENT)
Dept: TRANSPLANT | Facility: CLINIC | Age: 37
End: 2022-11-18
Payer: MEDICAID

## 2022-11-18 ENCOUNTER — PATIENT MESSAGE (OUTPATIENT)
Dept: CARDIOTHORACIC SURGERY | Facility: CLINIC | Age: 37
End: 2022-11-18
Payer: MEDICAID

## 2022-11-23 ENCOUNTER — ANTI-COAG VISIT (OUTPATIENT)
Dept: CARDIOLOGY | Facility: CLINIC | Age: 37
End: 2022-11-23
Payer: MEDICAID

## 2022-11-23 ENCOUNTER — LAB VISIT (OUTPATIENT)
Dept: LAB | Facility: HOSPITAL | Age: 37
End: 2022-11-23
Payer: MEDICAID

## 2022-11-23 ENCOUNTER — TELEPHONE (OUTPATIENT)
Dept: TRANSPLANT | Facility: CLINIC | Age: 37
End: 2022-11-23
Payer: MEDICAID

## 2022-11-23 DIAGNOSIS — Z95.811 LVAD (LEFT VENTRICULAR ASSIST DEVICE) PRESENT: ICD-10-CM

## 2022-11-23 DIAGNOSIS — Z95.811 LVAD (LEFT VENTRICULAR ASSIST DEVICE) PRESENT: Primary | ICD-10-CM

## 2022-11-23 LAB
ALBUMIN SERPL-MCNC: 3.9 GM/DL (ref 3.5–5)
ALBUMIN/GLOB SERPL: 1 RATIO (ref 1.1–2)
ALP SERPL-CCNC: 112 UNIT/L (ref 40–150)
ALT SERPL-CCNC: 38 UNIT/L (ref 0–55)
AST SERPL-CCNC: 28 UNIT/L (ref 5–34)
BASOPHILS # BLD AUTO: 0.03 X10(3)/MCL (ref 0–0.2)
BASOPHILS NFR BLD AUTO: 0.3 %
BILIRUBIN DIRECT+TOT PNL SERPL-MCNC: 0.6 MG/DL
BNP BLD-MCNC: 71.9 PG/ML
BUN SERPL-MCNC: 13.7 MG/DL (ref 8.9–20.6)
CALCIUM SERPL-MCNC: 9.9 MG/DL (ref 8.4–10.2)
CHLORIDE SERPL-SCNC: 101 MMOL/L (ref 98–107)
CO2 SERPL-SCNC: 26 MMOL/L (ref 22–29)
CREAT SERPL-MCNC: 2.13 MG/DL (ref 0.73–1.18)
EOSINOPHIL # BLD AUTO: 0.11 X10(3)/MCL (ref 0–0.9)
EOSINOPHIL NFR BLD AUTO: 1 %
ERYTHROCYTE [DISTWIDTH] IN BLOOD BY AUTOMATED COUNT: 17.2 % (ref 11.5–17)
GFR SERPLBLD CREATININE-BSD FMLA CKD-EPI: 40 MLS/MIN/1.73/M2
GLOBULIN SER-MCNC: 4.1 GM/DL (ref 2.4–3.5)
GLUCOSE SERPL-MCNC: 214 MG/DL (ref 74–100)
HCT VFR BLD AUTO: 43.5 % (ref 42–52)
HGB BLD-MCNC: 14.2 GM/DL (ref 14–18)
IMM GRANULOCYTES # BLD AUTO: 0.02 X10(3)/MCL (ref 0–0.04)
IMM GRANULOCYTES NFR BLD AUTO: 0.2 %
INR BLD: 1.84 (ref 0–1.3)
LYMPHOCYTES # BLD AUTO: 3.42 X10(3)/MCL (ref 0.6–4.6)
LYMPHOCYTES NFR BLD AUTO: 30.9 %
MCH RBC QN AUTO: 28.6 PG (ref 27–31)
MCHC RBC AUTO-ENTMCNC: 32.6 MG/DL (ref 33–36)
MCV RBC AUTO: 87.5 FL (ref 80–94)
MONOCYTES # BLD AUTO: 1.13 X10(3)/MCL (ref 0.1–1.3)
MONOCYTES NFR BLD AUTO: 10.2 %
NEUTROPHILS # BLD AUTO: 6.4 X10(3)/MCL (ref 2.1–9.2)
NEUTROPHILS NFR BLD AUTO: 57.4 %
NRBC BLD AUTO-RTO: 0 %
PLATELET # BLD AUTO: 304 X10(3)/MCL (ref 130–400)
PMV BLD AUTO: 9.9 FL (ref 7.4–10.4)
POTASSIUM SERPL-SCNC: 4.1 MMOL/L (ref 3.5–5.1)
PROT SERPL-MCNC: 8 GM/DL (ref 6.4–8.3)
PROTHROMBIN TIME: 20.9 SECONDS (ref 12.5–14.5)
RBC # BLD AUTO: 4.97 X10(6)/MCL (ref 4.7–6.1)
SODIUM SERPL-SCNC: 138 MMOL/L (ref 136–145)
WBC # SPEC AUTO: 11.1 X10(3)/MCL (ref 4.5–11.5)

## 2022-11-23 PROCEDURE — 36415 COLL VENOUS BLD VENIPUNCTURE: CPT

## 2022-11-23 PROCEDURE — 85025 COMPLETE CBC W/AUTO DIFF WBC: CPT

## 2022-11-23 PROCEDURE — 80053 COMPREHEN METABOLIC PANEL: CPT

## 2022-11-23 PROCEDURE — 83880 ASSAY OF NATRIURETIC PEPTIDE: CPT

## 2022-11-23 PROCEDURE — 85610 PROTHROMBIN TIME: CPT

## 2022-11-23 NOTE — PROGRESS NOTES
Patient questioned and reports 4.5 mg daily except 3 mg wed / fri .  Patient reports he taken 3 mg today already on his own . Denies any other changes.

## 2022-11-23 NOTE — TELEPHONE ENCOUNTER
Called pt to check on him seeing he missed his lab appt Monday.  No answer, no voicemail set up.   Called his significant other, spoke with her.  Rescheduled lab for this afternoon.  I explained we may not get there results until Friday, but asked them to please go today.

## 2022-11-28 LAB — INR PPP: 2

## 2022-11-29 ENCOUNTER — ANTI-COAG VISIT (OUTPATIENT)
Dept: CARDIOLOGY | Facility: CLINIC | Age: 37
End: 2022-11-29
Payer: MEDICAID

## 2022-11-29 DIAGNOSIS — Z95.811 LVAD (LEFT VENTRICULAR ASSIST DEVICE) PRESENT: Primary | ICD-10-CM

## 2022-11-29 PROCEDURE — 93793 ANTICOAG MGMT PT WARFARIN: CPT | Mod: ,,,

## 2022-11-29 PROCEDURE — 93793 PR ANTICOAGULANT MGMT FOR PT TAKING WARFARIN: ICD-10-PCS | Mod: ,,,

## 2022-12-05 LAB — INR PPP: 1.3

## 2022-12-07 ENCOUNTER — ANTI-COAG VISIT (OUTPATIENT)
Dept: CARDIOLOGY | Facility: CLINIC | Age: 37
End: 2022-12-07
Payer: MEDICAID

## 2022-12-07 DIAGNOSIS — Z95.811 LVAD (LEFT VENTRICULAR ASSIST DEVICE) PRESENT: Primary | ICD-10-CM

## 2022-12-07 PROCEDURE — 93793 PR ANTICOAGULANT MGMT FOR PT TAKING WARFARIN: ICD-10-PCS | Mod: ,,,

## 2022-12-07 PROCEDURE — 93793 ANTICOAG MGMT PT WARFARIN: CPT | Mod: ,,,

## 2022-12-07 NOTE — PROGRESS NOTES
Patient girlfriend Darlyn Wright questioned and confirmed correct dosage . Reports having iceberg salads , but denies any dark leafy greens, missed doses, otc meds, nuts intake , or etc .

## 2022-12-12 LAB
EXT ALBUMIN: 4.8
EXT ALT: 43
EXT AST: 34
EXT BILIRUBIN TOTAL: 0.5
EXT BUN: 21
EXT CALCIUM: 10.2
EXT CHLORIDE: 87
EXT CREATININE: 1.76 MG/DL
EXT GLUCOSE: 555
EXT HEMATOCRIT: 43
EXT HEMOGLOBIN: 14.5
EXT MAGNESIUM: 2.1
EXT PLATELETS: 282
EXT POTASSIUM: 4.3
EXT PROTEIN TOTAL: 8.7
EXT SODIUM: 129 MMOL/L
EXT WBC: 9.1
INR PPP: 2.2

## 2022-12-13 ENCOUNTER — TELEPHONE (OUTPATIENT)
Dept: TRANSPLANT | Facility: CLINIC | Age: 37
End: 2022-12-13
Payer: MEDICAID

## 2022-12-13 ENCOUNTER — ANTI-COAG VISIT (OUTPATIENT)
Dept: CARDIOLOGY | Facility: CLINIC | Age: 37
End: 2022-12-13
Payer: MEDICAID

## 2022-12-13 DIAGNOSIS — Z95.811 LVAD (LEFT VENTRICULAR ASSIST DEVICE) PRESENT: Primary | ICD-10-CM

## 2022-12-13 PROCEDURE — 93793 PR ANTICOAGULANT MGMT FOR PT TAKING WARFARIN: ICD-10-PCS | Mod: ,,,

## 2022-12-13 PROCEDURE — 93793 ANTICOAG MGMT PT WARFARIN: CPT | Mod: ,,,

## 2022-12-13 NOTE — TELEPHONE ENCOUNTER
External labs entered for Review  Labs completed on 12/22/22  Results routed to VAD Coordinator    BNP PENDING

## 2022-12-14 ENCOUNTER — TELEPHONE (OUTPATIENT)
Dept: RESEARCH | Facility: HOSPITAL | Age: 37
End: 2022-12-14
Payer: MEDICAID

## 2022-12-14 DIAGNOSIS — Z95.811 LVAD (LEFT VENTRICULAR ASSIST DEVICE) PRESENT: Primary | ICD-10-CM

## 2022-12-14 DIAGNOSIS — Z00.6 EXAMINATION OF PARTICIPANT IN CLINICAL TRIAL: ICD-10-CM

## 2022-12-14 NOTE — TELEPHONE ENCOUNTER
Study Title:  Antiplatelet Removal and HemocompatIbility EventS with the HeartMate 3 Pump   Protocol: [id number]  IRB #/Approval Date: [id number]  Sponsor: [employer]  : Dr. Luis F Paige     Left phone message on Mother's phone since his phone was unavailable. Reminder to please meet me in Lab Prior to checking in so that I can get the Research blood drawn at same time and other lab tests.

## 2022-12-14 NOTE — PROGRESS NOTES
ADVANCED HEART FAILURE AND TRANSPLANTATION LVAD CLINIC VISIT    CHIEF COMPLAINT:  Follow-up of chronic heart failure post-LVAD    HISTORY OF PRESENT ILLNESS:  Kevan Queen is a 37 y.o.  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 He underwent RVAD removal and chest closure 6/30/2022.  He also completed a course of IV Abx for staph epi. He was weaned off  but he had to restarted due to RVF. He was eventually transitioned to milrinone (secondary to  shortage) now on 0.25 mcg/kg/min.     Co-morbidities: history of polysubstance abuse, recently diagnosed DM2 complicated by HHS, episodes of unclear syncope vs seizure    Post-VAD complications:  RV failure+EARLY with RVAD now on milrinone 0.25 mcg/kg/min in D5W  Aortic regurgitation-  Hemocompatibility-related events (CVA, thrombosis, bleeding)-  Infection-  Arrhythmias-    Last seen in clinic 10/31/2022 at which time no changes were made to therapy with INR adjustment made.     Since their last clinic visit, his blood sugars have been higher with local labs  but drawn from PICC with milrinone which is running in D5W. Last possible seizure activity Sunday after Thanksgiving. Did note some alcohol intake with family gathering. This was after eating and not sure he if completely had LOC but threw up during this. But he felt better after this. He remains on Keppra but hasn't seen Neurology recently.     Doppler 90, DLES 1, nonpulsatile. Notes SOB with only prolonged activities. Notes occasional abdominal distension.   Denies orthopnea, PND, bendopnea, early satiety, nausea, lower extremity edema, chest discomfort, clear presyncope, palpitations, or syncope. Denies adverse bleeding, no hematochezia/melena/hematuria/hematemesis.    INR goal: Goal INR 2.0-3.0 bridge with  heparin/Lovenox  Antiplatelets: GASTON trial  LDH: stable overall  Speed set at:  5100 RPM  Pulsatility: non-pulsatile  Antihypertensives: below  Diuretic: Lasix 80 mg daily  GDMT spironolactone 25 mg daily  VAD interrogation: no alarms but some speed drops  TXP JOY INTERROGATIONS 12/15/2022 10/31/2022 10/31/2022   Type HeartMate3 - HeartMate3   Flow 3.6 - 3.8   Speed 5100 - 5100   PI 7.5 - 7.6   Power (Serna) 3.8 - 3.8   LSL 4700 - 4700   Pulsatility No Pulse Pulse Intermittent pulse     I interrogated the patient's HeartMate 3 LVAD.     Current device parameters reviewed. There was no evidence of power spikes or suction events. The driveline was structurally intact without evidence of infection.     Cardiac Data:  Transthoracic Echo: Results for orders placed during the hospital encounter of 09/01/22    Echo    Interpretation Summary  · There is an LVAD present. Base speed is 5100 RPMs. The pump type is a Heartmate III. The interventricular septum appears midline. The aortic valve does not open.  · The left ventricle is mildly enlarged with severely decreased systolic function.  · The estimated ejection fraction is 10%. There is left ventricular global hypokinesis.  · Left ventricular diastolic dysfunction.  · Severe right ventricular enlargement with moderately to severely reduced right ventricular systolic function.  · The estimated PA systolic pressure is 22 mmHg.  · Normal central venous pressure (3 mmHg).    ECG:  bpm, LAD,  ms    Left Heart Catheterization: none recent    Right Heart Catheterization: Results for orders placed during the hospital encounter of 10/31/22  RA: 21/ 20/ 18 RV: 35/ 7/ 22 PA: 35/ 19/ 24 PWP: 22/ 22/ 21 .   Cardiac output was 3.55  by Naomi. Cardiac index is 1.6 L/min/m2.   O2 Sat: PA 54%.   Severely reduced CO/CI on milrinone 0.125mcg/kg/min and HM3 at 5100rpm.  Significantly increased right and left sided filling pressures.  Mild pulmonary hypertension due to  significantly elevated left sided filling pressure.  TPG  3  CINDI 0.89    Would like to review and discuss as a team. Could benefit from increased volume removal however patient feels great, will readdress after review with team.    Devices: ICD    PAST MEDICAL HISTORY:  Past Medical History:   Diagnosis Date    Arthritis     Cardiomyopathy     CHF (congestive heart failure) 10/01/2020    Diabetes mellitus     Dilated cardiomyopathy 10/26/2020    Drug abuse 10/2020    Hyperosmolar hyperglycemic state (HHS) 5/25/2022    ICD (implantable cardioverter-defibrillator) in place 10/26/2020    Muscle cramping 6/15/2022    Renal disorder        PAST SURGICAL HISTORY:  Past Surgical History:   Procedure Laterality Date    APPLICATION OF WOUND VACUUM-ASSISTED CLOSURE DEVICE N/A 6/30/2022    Procedure: APPLICATION, WOUND VAC;  Surgeon: Luis F Paige MD;  Location: [facility] OR [address] [place] [address];  Service: Cardiovascular;  Laterality: N/A;  50 x 5 cm    CARDIAC DEFIBRILLATOR PLACEMENT      IMPLANTATION OF RIGHT VENTRICULAR ASSIST DEVICE (RVAD) N/A 6/29/2022    Procedure: INSERTION, RVAD;  Surgeon: Luis F Paige MD;  Location: [facility] OR [address] [place] [address];  Service: Cardiovascular;  Laterality: N/A;    INSERTION OF GRAFT TO PERICARDIUM Right 6/30/2022    Procedure: INSERTION-RIGHT VENTRICULAR ASSIST DEVICE;  Surgeon: Luis F Paige MD;  [facility];  Service: Cardiovascular;  Laterality: Right;    IRRIGATION OF MEDIASTINUM  6/30/2022    Procedure: IRRIGATION, MEDIASTINUM;  Surgeon: Luis F Paige MD;  [facility];  Service: Cardiovascular;;    LEFT VENTRICULAR ASSIST DEVICE Left 6/23/2022    Procedure: INSERTION-LEFT VENTRICULAR ASSIST DEVICE;  Surgeon: Luis F Paige MD;  Location: [facility] OR [address] [place] [address];  Service: Cardiovascular;  Laterality: Left;    LEFT VENTRICULAR ASSIST DEVICE N/A 6/29/2022    Procedure: INSERTION-LEFT VENTRICULAR ASSIST DEVICE;  Surgeon: Luis F Paige MD;  Location: [facility] OR [address] [place] [address];  Service: Cardiovascular;   Laterality: N/A;    RIGHT HEART CATHETERIZATION Right 2022    Procedure: INSERTION, CATHETER, RIGHT HEART;  Surgeon: Luca Lopez Jr., MD;  Location: [facility] CATH LAB;  Service: Cardiology;  Laterality: Right;    RIGHT HEART CATHETERIZATION Right 2022    Procedure: INSERTION, CATHETER, RIGHT HEART;  Surgeon: Josh Pulido MD;  Location: [facility] CATH LAB;  Service: Cardiology;  Laterality: Right;    RIGHT HEART CATHETERIZATION Right 2022    Procedure: INSERTION, CATHETER, RIGHT HEART;  Surgeon: Dalia Crum MD;  Location: [facility] CATH LAB;  Service: Cardiology;  Laterality: Right;    RIGHT HEART CATHETERIZATION Right 10/31/2022    Procedure: INSERTION, CATHETER, RIGHT HEART;  Surgeon: Dalia Crum MD;  Location: [facility] CATH LAB;  Service: Cardiology;  Laterality: Right;    STERNAL WOUND CLOSURE N/A 2022    Procedure: CLOSURE, WOUND, STERNUM;  Surgeon: Luis F Paige MD;  Location: [facility] OR [address] [place] [address];  Service: Cardiovascular;  Laterality: N/A;       SOCIAL HISTORY  Social History     Socioeconomic History    Marital status: Legally    Tobacco Use    Smoking status: Former     Packs/day: 0.50     Years: 16.00     Pack years: 8.00     Types: Cigarettes     Start date: 10/1/2004     Quit date: 2021     Years since quittin.6    Smokeless tobacco: Never   Substance and Sexual Activity    Alcohol use: Not Currently    Drug use: Not Currently     Types: Marijuana, MDMA (Ecstacy)    Sexual activity: Yes     Partners: Female     Birth control/protection: None       FAMILY HISTORY  Family History   Problem Relation Age of Onset    Heart failure Father     Diverticulosis Brother     Heart attack Maternal Grandmother     Heart attack Maternal Grandfather        ALLERGIES:  Review of patient's allergies indicates:   Allergen Reactions    Aspirin Other (See Comments)     Mr. Thacker is enrolled in Dr. Paige's Alon Trial and cannot have any aspirin and/or aspirin-containing products.  DO NOT cancel any orders for INV Aspirin 100 mg/Placebo. If you have questions, please contact Isabel @ [phone], [email or web address], secure chat or MS Teams message.    Bumex [bumetanide] Hives    Lactose Diarrhea     Other reaction(s): Abdominal distension, gaseous    Torsemide Hives       MEDICATIONS  Current Outpatient Medications on File Prior to Visit   Medication Sig Dispense Refill    acetaminophen (TYLENOL) 500 MG tablet Take 500 mg by mouth every 6 (six) hours as needed for Pain.      albuterol (PROVENTIL/VENTOLIN HFA) 90 mcg/actuation inhaler INHALE 2 PUFFS BY MOUTH EVERY 4 HOURS AS NEEDED FOR COUGH OR WHEEZING      blood sugar diagnostic Strp Use to test blood glucose 4 (four) times daily. 200 each 3    blood-glucose meter Misc Use as instructed 1 each 0    busPIRone (BUSPAR) 7.5 MG tablet Take 1 tablet (7.5 mg total) by mouth once daily at 6am. 30 tablet 11    famotidine (PEPCID) 40 MG tablet Take 1 tablet (40 mg total) by mouth once daily. 30 tablet 11    ferrous gluconate 324 mg (37.5 mg iron) Tab tablet Take 1 tablet (324 mg total) by mouth daily with breakfast. 30 tablet 11    furosemide (LASIX) 80 MG tablet Take 1 tablet (80 mg total) by mouth once daily. 30 tablet 11    gabapentin (NEURONTIN) 100 MG capsule Take 1 capsule (100 mg total) by mouth 3 (three) times daily. 90 capsule 11    gabapentin (NEURONTIN) 300 MG capsule Take 1 capsule (300 mg total) by mouth 3 (three) times daily. 90 capsule 11    insulin aspart U-100 (NOVOLOG) 100 unit/mL (3 mL) InPn pen Inject 14 Units into the skin 3 (three) times daily. 30 mL 3    insulin detemir U-100 (LEVEMIR FLEXTOUCH) 100 unit/mL (3 mL) SubQ InPn pen Inject 36 Units into the skin every evening. 18 mL 3    INV ASPIRIN 100MG/PLACEBO Take 1 capsule (100mg ) by mouth once daily. FOR INVESTIGATIONAL USE ONLY. Protocol: GASTON III subject ID: [id number] 120 each 0    lancets 33 gauge Misc Use to test blood glucose 4 (four) times daily. 200 each  "3    levETIRAcetam (KEPPRA) 1000 MG tablet Take 1 tablet (1,000 mg total) by mouth 2 (two) times daily. 60 tablet 11    methocarbamoL (ROBAXIN) 500 MG Tab Take 1 tablet (500 mg total) by mouth 4 (four) times daily as needed (muscle pain). 30 tablet 2    milrinone (PRIMACOR) 1 mg/mL injection Inject into the vein.      milrinone 20mg/100ml D5W, 200mcg/ml, (PRIMACOR) 20 mg/100 mL (200 mcg/mL) infusion Inject 34.875 mcg/min into the vein continuous.      mirtazapine (REMERON) 15 MG tablet Take 1 tablet (15 mg total) by mouth nightly. 30 tablet 11    omega-3 acid ethyl esters (LOVAZA) 1 gram capsule Take 2 capsules (2 g total) by mouth 2 (two) times daily. 360 capsule 3    pen needle, diabetic 31 gauge x 1/4" Ndle 1 Device by Misc.(Non-Drug; Combo Route) route 4 (four) times daily. 180 each 3    potassium chloride SA (K-DUR,KLOR-CON) 20 MEQ tablet Take 2 tablets (40 mEq total) by mouth 3 (three) times daily. 180 tablet 11    senna-docusate 8.6-50 mg (SENNA-S) 8.6-50 mg per tablet Take 2 tablets by mouth once daily. 60 tablet 11    spironolactone (ALDACTONE) 25 MG tablet Take 1 tablet (25 mg total) by mouth once daily. 30 tablet 11    warfarin (COUMADIN) 3 MG tablet Take 4.5 mg orally daily on Mondays and Thursdays. Take 3mg orally daily on other days. 30 tablet 11    [DISCONTINUED] digoxin (LANOXIN) 125 mcg tablet Take 1 tablet (0.125 mg total) by mouth once daily. 30 tablet 11    [DISCONTINUED] EScitalopram oxalate (LEXAPRO) 5 MG Tab Take 1 tablet (5 mg total) by mouth once daily. 90 tablet 3    [DISCONTINUED] insulin (LANTUS SOLOSTAR U-100 INSULIN) glargine 100 units/mL (3mL) SubQ pen Inject 10 Units into the skin every evening. (Patient not taking: Reported on 5/24/2022) 3 mL 11    [DISCONTINUED] metOLazone (ZAROXOLYN) 2.5 MG tablet Take 2.5 mg by mouth every other day.      [DISCONTINUED] metoprolol succinate (TOPROL-XL) 25 MG 24 hr tablet TAKE 1 TABLET(25 MG) BY MOUTH EVERY DAY 30 tablet 0    [DISCONTINUED] " "pantoprazole (PROTONIX) 40 MG tablet Take 1 tablet (40 mg total) by mouth once daily. 30 tablet 11     No current facility-administered medications on file prior to visit.       REVIEW OF SYSTEMS  Review of Systems   All other systems reviewed and are negative.    PHYSICAL EXAM:    Vitals:    12/15/22 1455   BP: (!) 90/0   BP Location: Left arm   Patient Position: Sitting   Temp: 98.1 °F (36.7 °C)   TempSrc: Oral   Weight: 96.6 kg (213 lb)   Height: 6' 3.5" (1.918 m)    Body mass index is 26.27 kg/m².    GENERAL: Alert, NAD   HEENT: Anicteric sclerae  NECK: JVP not visible above level of clavicle while sitting upright and noted mid neck reclining 45 degrees with + hepatojugular reflux  CARDIOVASCULAR: VAD hum present  PULMONARY: Lungs clear to auscultation bilaterally  ABDOMEN: Soft, nontender, nondistended. Driveline site with gauze c/d/i. No guarding, no rebound, no hepatomegaly  EXTREMITIES: Warm. No clubbing, cyanosis or edema. No pre-sacral edema  NEUROLOGIC: No focal deficits    LABS:    LABS ALL PENDING AT TIME OF APPOINTMENT  Lab Results   Component Value Date     11/23/2022    K 4.1 11/23/2022     10/31/2022    CO2 26 11/23/2022    BUN 13.7 11/23/2022    CREATININE 2.13 (H) 11/23/2022    CALCIUM 9.9 11/23/2022    ANIONGAP 12 10/31/2022    EGFRNORACEVR 40 11/23/2022       Magnesium   Date Value Ref Range Status   10/31/2022 1.7 1.6 - 2.6 mg/dL Final       Lab Results   Component Value Date    WBC 11.1 11/23/2022    HGB 14.2 11/23/2022    HCT 43.5 11/23/2022    MCV 87.5 11/23/2022     11/23/2022       Lab Results   Component Value Date    INR 2.2 12/12/2022    INR 1.3 12/05/2022    INR 2.0 11/28/2022    PROTIME 20.9 (H) 11/23/2022    PROTIME 17.0 (H) 11/16/2022    PROTIME 22.7 (H) 08/30/2022       BNP   Date Value Ref Range Status   10/31/2022 198 (H) 0 - 99 pg/mL Final     Comment:     Values of less than 100 pg/ml are consistent with non-CHF populations.   09/05/2022 90 0 - 99 pg/mL Final "     Comment:     Values of less than 100 pg/ml are consistent with non-CHF populations.   09/02/2022 43 0 - 99 pg/mL Final     Comment:     Values of less than 100 pg/ml are consistent with non-CHF populations.     Natriuretic Peptide   Date Value Ref Range Status   11/23/2022 71.9 <=100.0 pg/mL Final   11/16/2022 93.7 <=100.0 pg/mL Final       LD   Date Value Ref Range Status   10/31/2022 281 (H) 110 - 260 U/L Final     Comment:     Results are increased in hemolyzed samples.   09/06/2022 213 110 - 260 U/L Final     Comment:     Results are increased in hemolyzed samples.   09/05/2022 223 110 - 260 U/L Final     Comment:     Results are increased in hemolyzed samples.     Lactate Dehydrogenase   Date Value Ref Range Status   11/16/2022 266 (H) 125 - 220 U/L Final       IMPRESSION:    NYHA Class II  ACC/AHA Stage D  Roper profile B    1. LVAD (left ventricular assist device) present    2. Type 2 diabetes mellitus with hyperglycemia, with long-term current use of insulin    3. Chronic combined systolic and diastolic congestive heart failure    4. Familial dilated cardiomyopathy    5. Right heart failure secondary to left heart failure-post LVAD surgery    6. History of substance abuse    7. Seizure-like activity    8. Anemia of chronic disease    9. Examination of participant in clinical trial        PLAN:    In setting of RV dysfunction, ok with current JVP. Will continue Lasix 80 mg PO daily and follow daily weights and fluid/salt intake especially with holidays upcoming. He is drinking alcohol he noted minimal during holidays. Educated importance of avoiding this especially with unclear seizure activity that was also precipitated during this time.     Labs were pending at time of visit and after he left CMP resulted with critical result of glucose >600. Instructed to go to ER for suspected admission. His milrinone does run in D5W and will discuss with pharmacy whether can be changed to NS.     His glucose  fluctuations may also be tied in to his seizure-like activity. At this time, continuing Keppra with pending Neurology follow-up    Recommend 2 gram sodium restriction and 1500 mL fluid restriction.  Encourage physical activity with graded exercise program.  Requested patient to weigh themselves daily, and to notify us if their weight increases by more than 3 lbs in 1 day or 5 lbs in 1 week.   Pt to call us withmore shortness of breath, swelling or unexpected weight changes, fever, chills, alarms, bloody or black bowel movements, or drainage from the driveline    Additionally, the patient has a patient centered goal of being able to improve their quality of life     F/U 1 month with clinic visit, labs and interrogation after hospital discharge    DT-VAD    UNOS Patient Status  Functional Status: 90% - Able to carry on normal activity: minor symptoms of disease  Physical Capacity: No Limitations  Working for Income: Unknown      Electronically signed by:   Angela Yang   12/14/2022 1:48 PM

## 2022-12-15 ENCOUNTER — OFFICE VISIT (OUTPATIENT)
Dept: TRANSPLANT | Facility: CLINIC | Age: 37
End: 2022-12-15
Payer: MEDICAID

## 2022-12-15 ENCOUNTER — RESEARCH ENCOUNTER (OUTPATIENT)
Dept: RESEARCH | Facility: HOSPITAL | Age: 37
End: 2022-12-15
Payer: MEDICAID

## 2022-12-15 ENCOUNTER — HOSPITAL ENCOUNTER (OUTPATIENT)
Dept: PULMONOLOGY | Facility: CLINIC | Age: 37
Discharge: HOME | End: 2022-12-15
Payer: MEDICAID

## 2022-12-15 ENCOUNTER — TELEPHONE (OUTPATIENT)
Dept: TRANSPLANT | Facility: CLINIC | Age: 37
End: 2022-12-15
Payer: MEDICAID

## 2022-12-15 ENCOUNTER — CLINICAL SUPPORT (OUTPATIENT)
Dept: TRANSPLANT | Facility: CLINIC | Age: 37
End: 2022-12-15
Payer: MEDICAID

## 2022-12-15 ENCOUNTER — HOSPITAL ENCOUNTER (OUTPATIENT)
Dept: CARDIOLOGY | Facility: CLINIC | Age: 37
Discharge: HOME | End: 2022-12-15
Payer: MEDICAID

## 2022-12-15 ENCOUNTER — ANTI-COAG VISIT (OUTPATIENT)
Dept: CARDIOLOGY | Facility: CLINIC | Age: 37
End: 2022-12-15
Payer: MEDICAID

## 2022-12-15 ENCOUNTER — HOSPITAL ENCOUNTER (OUTPATIENT)
Facility: HOSPITAL | Age: 37
Discharge: HOME | End: 2022-12-16
Attending: EMERGENCY MEDICINE | Admitting: EMERGENCY MEDICINE
Payer: MEDICAID

## 2022-12-15 VITALS — WEIGHT: 213 LBS | TEMPERATURE: 98 F | BODY MASS INDEX: 25.94 KG/M2 | SYSTOLIC BLOOD PRESSURE: 90 MMHG | HEIGHT: 76 IN

## 2022-12-15 VITALS — BODY MASS INDEX: 31.5 KG/M2 | WEIGHT: 220 LBS | HEIGHT: 70 IN

## 2022-12-15 DIAGNOSIS — Z00.6 EXAMINATION OF PARTICIPANT IN CLINICAL TRIAL: ICD-10-CM

## 2022-12-15 DIAGNOSIS — Z95.811 LVAD (LEFT VENTRICULAR ASSIST DEVICE) PRESENT: ICD-10-CM

## 2022-12-15 DIAGNOSIS — E11.65 TYPE 2 DIABETES MELLITUS WITH HYPERGLYCEMIA, WITH LONG-TERM CURRENT USE OF INSULIN: Primary | ICD-10-CM

## 2022-12-15 DIAGNOSIS — Z79.4 TYPE 2 DIABETES MELLITUS WITH HYPERGLYCEMIA, WITH LONG-TERM CURRENT USE OF INSULIN: ICD-10-CM

## 2022-12-15 DIAGNOSIS — I50.42 CHRONIC COMBINED SYSTOLIC AND DIASTOLIC CONGESTIVE HEART FAILURE: ICD-10-CM

## 2022-12-15 DIAGNOSIS — Z79.4 TYPE 2 DIABETES MELLITUS WITH HYPERGLYCEMIA, WITH LONG-TERM CURRENT USE OF INSULIN: Primary | ICD-10-CM

## 2022-12-15 DIAGNOSIS — I50.814 RIGHT HEART FAILURE SECONDARY TO LEFT HEART FAILURE: ICD-10-CM

## 2022-12-15 DIAGNOSIS — Z95.811 LVAD (LEFT VENTRICULAR ASSIST DEVICE) PRESENT: Primary | ICD-10-CM

## 2022-12-15 DIAGNOSIS — R56.9 SEIZURE-LIKE ACTIVITY: ICD-10-CM

## 2022-12-15 DIAGNOSIS — F19.11 HISTORY OF SUBSTANCE ABUSE: ICD-10-CM

## 2022-12-15 DIAGNOSIS — E11.65 TYPE 2 DIABETES MELLITUS WITH HYPERGLYCEMIA: ICD-10-CM

## 2022-12-15 DIAGNOSIS — D63.8 ANEMIA OF CHRONIC DISEASE: ICD-10-CM

## 2022-12-15 DIAGNOSIS — R73.9 HYPERGLYCEMIA: ICD-10-CM

## 2022-12-15 DIAGNOSIS — E11.65 TYPE 2 DIABETES MELLITUS WITH HYPERGLYCEMIA, WITH LONG-TERM CURRENT USE OF INSULIN: ICD-10-CM

## 2022-12-15 DIAGNOSIS — I42.0 FAMILIAL DILATED CARDIOMYOPATHY: ICD-10-CM

## 2022-12-15 LAB
ALBUMIN SERPL BCP-MCNC: 4.1 G/DL (ref 3.5–5.2)
ALP SERPL-CCNC: 114 U/L (ref 55–135)
ALT SERPL W/O P-5'-P-CCNC: 28 U/L (ref 10–44)
ANION GAP SERPL CALC-SCNC: 11 MMOL/L (ref 8–16)
AST SERPL-CCNC: 27 U/L (ref 10–40)
B-OH-BUTYR BLD STRIP-SCNC: 0.4 MMOL/L (ref 0–0.5)
BASOPHILS # BLD AUTO: 0.03 K/UL (ref 0–0.2)
BASOPHILS NFR BLD: 0.3 % (ref 0–1.9)
BILIRUB SERPL-MCNC: 1.1 MG/DL (ref 0.1–1)
BUN SERPL-MCNC: 18 MG/DL (ref 6–20)
BUN SERPL-MCNC: 21 MG/DL (ref 6–30)
CALCIUM SERPL-MCNC: 9.8 MG/DL (ref 8.7–10.5)
CHLORIDE SERPL-SCNC: 91 MMOL/L (ref 95–110)
CHLORIDE SERPL-SCNC: 94 MMOL/L (ref 95–110)
CO2 SERPL-SCNC: 26 MMOL/L (ref 23–29)
CREAT SERPL-MCNC: 1.4 MG/DL (ref 0.5–1.4)
CREAT SERPL-MCNC: 1.8 MG/DL (ref 0.5–1.4)
DIFFERENTIAL METHOD: ABNORMAL
EOSINOPHIL # BLD AUTO: 0 K/UL (ref 0–0.5)
EOSINOPHIL NFR BLD: 0.3 % (ref 0–8)
ERYTHROCYTE [DISTWIDTH] IN BLOOD BY AUTOMATED COUNT: 14.9 % (ref 11.5–14.5)
EST. GFR  (NO RACE VARIABLE): 49.1 ML/MIN/1.73 M^2
GLUCOSE SERPL-MCNC: 521 MG/DL (ref 70–110)
GLUCOSE SERPL-MCNC: 522 MG/DL (ref 70–110)
HCT VFR BLD AUTO: 41.6 % (ref 40–54)
HCT VFR BLD CALC: 47 %PCV (ref 36–54)
HGB BLD-MCNC: 14.3 G/DL (ref 14–18)
IMM GRANULOCYTES # BLD AUTO: 0.02 K/UL (ref 0–0.04)
IMM GRANULOCYTES NFR BLD AUTO: 0.2 % (ref 0–0.5)
LYMPHOCYTES # BLD AUTO: 1.9 K/UL (ref 1–4.8)
LYMPHOCYTES NFR BLD: 18.8 % (ref 18–48)
MCH RBC QN AUTO: 29.7 PG (ref 27–31)
MCHC RBC AUTO-ENTMCNC: 34.4 G/DL (ref 32–36)
MCV RBC AUTO: 87 FL (ref 82–98)
MONOCYTES # BLD AUTO: 0.8 K/UL (ref 0.3–1)
MONOCYTES NFR BLD: 7.4 % (ref 4–15)
NEUTROPHILS # BLD AUTO: 7.5 K/UL (ref 1.8–7.7)
NEUTROPHILS NFR BLD: 73 % (ref 38–73)
NRBC BLD-RTO: 0 /100 WBC
PLATELET # BLD AUTO: 276 K/UL (ref 150–450)
PMV BLD AUTO: 10.8 FL (ref 9.2–12.9)
POC IONIZED CALCIUM: 1.13 MMOL/L (ref 1.06–1.42)
POC TCO2 (MEASURED): 28 MMOL/L (ref 23–29)
POCT GLUCOSE: 364 MG/DL (ref 70–110)
POCT GLUCOSE: 373 MG/DL (ref 70–110)
POTASSIUM BLD-SCNC: 4 MMOL/L (ref 3.5–5.1)
POTASSIUM SERPL-SCNC: 4 MMOL/L (ref 3.5–5.1)
PROT SERPL-MCNC: 8.6 G/DL (ref 6–8.4)
RBC # BLD AUTO: 4.81 M/UL (ref 4.6–6.2)
SAMPLE: ABNORMAL
SODIUM BLD-SCNC: 131 MMOL/L (ref 136–145)
SODIUM SERPL-SCNC: 128 MMOL/L (ref 136–145)
WBC # BLD AUTO: 10.24 K/UL (ref 3.9–12.7)

## 2022-12-15 PROCEDURE — 93793 ANTICOAG MGMT PT WARFARIN: CPT | Mod: ,,,

## 2022-12-15 PROCEDURE — G0378 HOSPITAL OBSERVATION PER HR: HCPCS

## 2022-12-15 PROCEDURE — 94618 PULMONARY STRESS TESTING: CPT | Mod: 26,S$PBB,, | Performed by: INTERNAL MEDICINE

## 2022-12-15 PROCEDURE — 99220 PR INITIAL OBSERVATION CARE,LEVL III: CPT | Mod: ,,, | Performed by: INTERNAL MEDICINE

## 2022-12-15 PROCEDURE — 93010 ELECTROCARDIOGRAM REPORT: CPT | Mod: ,,, | Performed by: INTERNAL MEDICINE

## 2022-12-15 PROCEDURE — 99285 EMERGENCY DEPT VISIT HI MDM: CPT | Mod: ,,, | Performed by: EMERGENCY MEDICINE

## 2022-12-15 PROCEDURE — 85025 COMPLETE CBC W/AUTO DIFF WBC: CPT | Mod: 91 | Performed by: EMERGENCY MEDICINE

## 2022-12-15 PROCEDURE — 93005 ELECTROCARDIOGRAM TRACING: CPT | Mod: PBBFAC | Performed by: INTERNAL MEDICINE

## 2022-12-15 PROCEDURE — 99220 PR INITIAL OBSERVATION CARE,LEVL III: ICD-10-PCS | Mod: ,,, | Performed by: INTERNAL MEDICINE

## 2022-12-15 PROCEDURE — 3051F PR MOST RECENT HEMOGLOBIN A1C LEVEL 7.0 - < 8.0%: ICD-10-PCS | Mod: CPTII,,, | Performed by: INTERNAL MEDICINE

## 2022-12-15 PROCEDURE — 93010 EKG 12-LEAD: ICD-10-PCS | Mod: ,,, | Performed by: INTERNAL MEDICINE

## 2022-12-15 PROCEDURE — 93750 INTERROGATION VAD IN PERSON: CPT | Mod: S$PBB,,, | Performed by: INTERNAL MEDICINE

## 2022-12-15 PROCEDURE — 99285 PR EMERGENCY DEPT VISIT,LEVEL V: ICD-10-PCS | Mod: ,,, | Performed by: EMERGENCY MEDICINE

## 2022-12-15 PROCEDURE — 80047 BASIC METABLC PNL IONIZED CA: CPT

## 2022-12-15 PROCEDURE — 27000248 HC VAD-ADDITIONAL DAY

## 2022-12-15 PROCEDURE — 25000003 PHARM REV CODE 250: Performed by: STUDENT IN AN ORGANIZED HEALTH CARE EDUCATION/TRAINING PROGRAM

## 2022-12-15 PROCEDURE — 25000003 PHARM REV CODE 250: Performed by: EMERGENCY MEDICINE

## 2022-12-15 PROCEDURE — 99999 PR PBB SHADOW E&M-EST. PATIENT-LVL IV: ICD-10-PCS | Mod: PBBFAC,,, | Performed by: INTERNAL MEDICINE

## 2022-12-15 PROCEDURE — 3051F HG A1C>EQUAL 7.0%<8.0%: CPT | Mod: CPTII,,, | Performed by: INTERNAL MEDICINE

## 2022-12-15 PROCEDURE — 96365 THER/PROPH/DIAG IV INF INIT: CPT

## 2022-12-15 PROCEDURE — 99214 OFFICE O/P EST MOD 30 MIN: CPT | Mod: PBBFAC,25 | Performed by: INTERNAL MEDICINE

## 2022-12-15 PROCEDURE — 1160F RVW MEDS BY RX/DR IN RCRD: CPT | Mod: CPTII,,, | Performed by: INTERNAL MEDICINE

## 2022-12-15 PROCEDURE — 4010F ACE/ARB THERAPY RXD/TAKEN: CPT | Mod: CPTII,,, | Performed by: INTERNAL MEDICINE

## 2022-12-15 PROCEDURE — 93010 EKG 12-LEAD: ICD-10-PCS | Mod: S$PBB,59,, | Performed by: INTERNAL MEDICINE

## 2022-12-15 PROCEDURE — 93750 PR INTERROGATE VENT ASSIST DEV, IN PERSON, W PHYSICIAN ANALYSIS: ICD-10-PCS | Mod: ,,, | Performed by: INTERNAL MEDICINE

## 2022-12-15 PROCEDURE — 1160F PR REVIEW ALL MEDS BY PRESCRIBER/CLIN PHARMACIST DOCUMENTED: ICD-10-PCS | Mod: CPTII,,, | Performed by: INTERNAL MEDICINE

## 2022-12-15 PROCEDURE — 3060F PR POS MICROALBUMINURIA RESULT DOCUMENTED/REVIEW: ICD-10-PCS | Mod: CPTII,,, | Performed by: INTERNAL MEDICINE

## 2022-12-15 PROCEDURE — 82010 KETONE BODYS QUAN: CPT | Performed by: EMERGENCY MEDICINE

## 2022-12-15 PROCEDURE — 99999 PR PBB SHADOW E&M-EST. PATIENT-LVL IV: CPT | Mod: PBBFAC,,, | Performed by: INTERNAL MEDICINE

## 2022-12-15 PROCEDURE — 1159F PR MEDICATION LIST DOCUMENTED IN MEDICAL RECORD: ICD-10-PCS | Mod: CPTII,,, | Performed by: INTERNAL MEDICINE

## 2022-12-15 PROCEDURE — 93750 INTERROGATION VAD IN PERSON: CPT | Mod: ,,, | Performed by: INTERNAL MEDICINE

## 2022-12-15 PROCEDURE — 94618 PULMONARY STRESS TESTING: ICD-10-PCS | Mod: 26,S$PBB,, | Performed by: INTERNAL MEDICINE

## 2022-12-15 PROCEDURE — 82962 GLUCOSE BLOOD TEST: CPT

## 2022-12-15 PROCEDURE — 93793 PR ANTICOAGULANT MGMT FOR PT TAKING WARFARIN: ICD-10-PCS | Mod: ,,,

## 2022-12-15 PROCEDURE — 3066F NEPHROPATHY DOC TX: CPT | Mod: CPTII,,, | Performed by: INTERNAL MEDICINE

## 2022-12-15 PROCEDURE — 3060F POS MICROALBUMINURIA REV: CPT | Mod: CPTII,,, | Performed by: INTERNAL MEDICINE

## 2022-12-15 PROCEDURE — 3066F PR DOCUMENTATION OF TREATMENT FOR NEPHROPATHY: ICD-10-PCS | Mod: CPTII,,, | Performed by: INTERNAL MEDICINE

## 2022-12-15 PROCEDURE — 82330 ASSAY OF CALCIUM: CPT

## 2022-12-15 PROCEDURE — 93750 INTERROGATION VAD IN PERSON: CPT | Mod: PBBFAC | Performed by: INTERNAL MEDICINE

## 2022-12-15 PROCEDURE — 99214 OFFICE O/P EST MOD 30 MIN: CPT | Mod: S$PBB,,, | Performed by: INTERNAL MEDICINE

## 2022-12-15 PROCEDURE — 4010F PR ACE/ARB THEARPY RXD/TAKEN: ICD-10-PCS | Mod: CPTII,,, | Performed by: INTERNAL MEDICINE

## 2022-12-15 PROCEDURE — 94618 PULMONARY STRESS TESTING: CPT | Mod: PBBFAC | Performed by: INTERNAL MEDICINE

## 2022-12-15 PROCEDURE — 93010 ELECTROCARDIOGRAM REPORT: CPT | Mod: S$PBB,59,, | Performed by: INTERNAL MEDICINE

## 2022-12-15 PROCEDURE — 99285 EMERGENCY DEPT VISIT HI MDM: CPT | Mod: 25,27

## 2022-12-15 PROCEDURE — 1159F MED LIST DOCD IN RCRD: CPT | Mod: CPTII,,, | Performed by: INTERNAL MEDICINE

## 2022-12-15 PROCEDURE — 94761 N-INVAS EAR/PLS OXIMETRY MLT: CPT

## 2022-12-15 PROCEDURE — 93750 PR INTERROGATE VENT ASSIST DEV, IN PERSON, W PHYSICIAN ANALYSIS: ICD-10-PCS | Mod: S$PBB,,, | Performed by: INTERNAL MEDICINE

## 2022-12-15 PROCEDURE — 80053 COMPREHEN METABOLIC PANEL: CPT | Mod: 91 | Performed by: EMERGENCY MEDICINE

## 2022-12-15 PROCEDURE — 93005 ELECTROCARDIOGRAM TRACING: CPT | Mod: 59

## 2022-12-15 PROCEDURE — 3008F BODY MASS INDEX DOCD: CPT | Mod: CPTII,,, | Performed by: INTERNAL MEDICINE

## 2022-12-15 PROCEDURE — 3008F PR BODY MASS INDEX (BMI) DOCUMENTED: ICD-10-PCS | Mod: CPTII,,, | Performed by: INTERNAL MEDICINE

## 2022-12-15 PROCEDURE — 99214 PR OFFICE/OUTPT VISIT, EST, LEVL IV, 30-39 MIN: ICD-10-PCS | Mod: S$PBB,,, | Performed by: INTERNAL MEDICINE

## 2022-12-15 PROCEDURE — 25000003 PHARM REV CODE 250: Performed by: INTERNAL MEDICINE

## 2022-12-15 RX ORDER — POTASSIUM CHLORIDE 20 MEQ/1
40 TABLET, EXTENDED RELEASE ORAL ONCE
Status: COMPLETED | OUTPATIENT
Start: 2022-12-15 | End: 2022-12-15

## 2022-12-15 RX ORDER — METHOCARBAMOL 500 MG/1
500 TABLET, FILM COATED ORAL 4 TIMES DAILY PRN
Status: DISCONTINUED | OUTPATIENT
Start: 2022-12-15 | End: 2022-12-16 | Stop reason: HOSPADM

## 2022-12-15 RX ORDER — POTASSIUM CHLORIDE 20 MEQ/1
20 TABLET, EXTENDED RELEASE ORAL ONCE
Status: COMPLETED | OUTPATIENT
Start: 2022-12-15 | End: 2022-12-15

## 2022-12-15 RX ORDER — GABAPENTIN 100 MG/1
100 CAPSULE ORAL 3 TIMES DAILY
Status: DISCONTINUED | OUTPATIENT
Start: 2022-12-15 | End: 2022-12-16 | Stop reason: HOSPADM

## 2022-12-15 RX ORDER — LEVETIRACETAM 500 MG/1
1000 TABLET ORAL 2 TIMES DAILY
Status: DISCONTINUED | OUTPATIENT
Start: 2022-12-15 | End: 2022-12-16 | Stop reason: HOSPADM

## 2022-12-15 RX ORDER — MIRTAZAPINE 15 MG/1
15 TABLET, FILM COATED ORAL NIGHTLY
Status: DISCONTINUED | OUTPATIENT
Start: 2022-12-15 | End: 2022-12-16 | Stop reason: HOSPADM

## 2022-12-15 RX ORDER — WARFARIN 3 MG/1
3 TABLET ORAL
Status: DISCONTINUED | OUTPATIENT
Start: 2022-12-16 | End: 2022-12-16 | Stop reason: HOSPADM

## 2022-12-15 RX ORDER — SODIUM CHLORIDE AND POTASSIUM CHLORIDE 150; 900 MG/100ML; MG/100ML
INJECTION, SOLUTION INTRAVENOUS CONTINUOUS
Status: DISCONTINUED | OUTPATIENT
Start: 2022-12-15 | End: 2022-12-15

## 2022-12-15 RX ORDER — SODIUM CHLORIDE, SODIUM LACTATE, POTASSIUM CHLORIDE, CALCIUM CHLORIDE 600; 310; 30; 20 MG/100ML; MG/100ML; MG/100ML; MG/100ML
INJECTION, SOLUTION INTRAVENOUS CONTINUOUS
Status: DISCONTINUED | OUTPATIENT
Start: 2022-12-15 | End: 2022-12-15

## 2022-12-15 RX ORDER — ACETAMINOPHEN 500 MG
500 TABLET ORAL EVERY 6 HOURS PRN
Status: DISCONTINUED | OUTPATIENT
Start: 2022-12-15 | End: 2022-12-16 | Stop reason: HOSPADM

## 2022-12-15 RX ORDER — MILRINONE LACTATE 0.2 MG/ML
0.25 INJECTION, SOLUTION INTRAVENOUS CONTINUOUS
Status: DISCONTINUED | OUTPATIENT
Start: 2022-12-15 | End: 2022-12-16 | Stop reason: HOSPADM

## 2022-12-15 RX ORDER — FERROUS GLUCONATE 324(37.5)
324 TABLET ORAL
Status: DISCONTINUED | OUTPATIENT
Start: 2022-12-16 | End: 2022-12-16 | Stop reason: HOSPADM

## 2022-12-15 RX ADMIN — LEVETIRACETAM 1000 MG: 500 TABLET, FILM COATED ORAL at 09:12

## 2022-12-15 RX ADMIN — MIRTAZAPINE 15 MG: 15 TABLET, FILM COATED ORAL at 09:12

## 2022-12-15 RX ADMIN — GABAPENTIN 100 MG: 100 CAPSULE ORAL at 09:12

## 2022-12-15 RX ADMIN — POTASSIUM CHLORIDE 40 MEQ: 1500 TABLET, EXTENDED RELEASE ORAL at 07:12

## 2022-12-15 RX ADMIN — POTASSIUM CHLORIDE 20 MEQ: 1500 TABLET, EXTENDED RELEASE ORAL at 09:12

## 2022-12-15 RX ADMIN — INSULIN HUMAN 2 UNITS/HR: 1 INJECTION, SOLUTION INTRAVENOUS at 10:12

## 2022-12-15 NOTE — ED PROVIDER NOTES
Encounter Date: 12/15/2022       History     Chief Complaint   Patient presents with    Hyperglycemia     Was instructed to come to ED due to glucose above 600. Pt. Has an LVAD.      37M with hx of DM, ICD, CHF, and RVAD on milrinone presents with hyperglycemia. He had an appointment with transplant doctor Dr. Yang today. Subsequently lab work showed a glucose >600. He is asymptomatic at this time. He denies chest pain, sob, palpitations, nausea, vomiting, or diarrhea. He reports his last insulin dose was last night ( he took 50 U). He forgot his dose this morning.        Review of patient's allergies indicates:   Allergen Reactions    Aspirin Other (See Comments)     Mr. Thacker is enrolled in Dr. Paige's Alon Trial and cannot have any aspirin and/or aspirin-containing products. DO NOT cancel any orders for INV Aspirin 100 mg/Placebo. If you have questions, please contact Isabel @ [phone], [phone],[email or web address], secure chat or MS Teams message.    Bumex [bumetanide] Hives    Lactose Diarrhea     Other reaction(s): Abdominal distension, gaseous    Torsemide Hives     Past Medical History:   Diagnosis Date    Arthritis     Cardiomyopathy     CHF (congestive heart failure) 10/01/2020    Diabetes mellitus     Dilated cardiomyopathy 10/26/2020    Drug abuse 10/2020    Hyperosmolar hyperglycemic state (HHS) 5/25/2022    ICD (implantable cardioverter-defibrillator) in place 10/26/2020    Muscle cramping 6/15/2022    Renal disorder      Past Surgical History:   Procedure Laterality Date    APPLICATION OF WOUND VACUUM-ASSISTED CLOSURE DEVICE N/A 6/30/2022    Procedure: APPLICATION, WOUND VAC;  Surgeon: Luis F Paige MD;  Location: [facility] OR [address] [place] [address];  Service: Cardiovascular;  Laterality: N/A;  50 x 5 cm    CARDIAC DEFIBRILLATOR PLACEMENT      IMPLANTATION OF RIGHT VENTRICULAR ASSIST DEVICE (RVAD) N/A 6/29/2022    Procedure: INSERTION, RVAD;  Surgeon: Luis F Paige MD;  Location: [facility] OR [address] [place] [address];   Service: Cardiovascular;  Laterality: N/A;    INSERTION OF GRAFT TO PERICARDIUM Right 6/30/2022    Procedure: INSERTION-RIGHT VENTRICULAR ASSIST DEVICE;  Surgeon: Luis F Paige MD;  [facility];  Service: Cardiovascular;  Laterality: Right;    IRRIGATION OF MEDIASTINUM  6/30/2022    Procedure: IRRIGATION, MEDIASTINUM;  Surgeon: Luis F Paige MD;  [facility];  Service: Cardiovascular;;    LEFT VENTRICULAR ASSIST DEVICE Left 6/23/2022    Procedure: INSERTION-LEFT VENTRICULAR ASSIST DEVICE;  Surgeon: Luis F Paige MD;  [facility];  Service: Cardiovascular;  Laterality: Left;    LEFT VENTRICULAR ASSIST DEVICE N/A 6/29/2022    Procedure: INSERTION-LEFT VENTRICULAR ASSIST DEVICE;  Surgeon: Luis F Paige MD;  Location: [facility] OR [address] [place] [address];  Service: Cardiovascular;  Laterality: N/A;    RIGHT HEART CATHETERIZATION Right 4/8/2022    Procedure: INSERTION, CATHETER, RIGHT HEART;  Surgeon: Luca Lopez Jr., MD;  Location: [facility] CATH LAB;  Service: Cardiology;  Laterality: Right;    RIGHT HEART CATHETERIZATION Right 4/19/2022    Procedure: INSERTION, CATHETER, RIGHT HEART;  Surgeon: Josh Pulido MD;  Location: [facility] CATH LAB;  Service: Cardiology;  Laterality: Right;    RIGHT HEART CATHETERIZATION Right 7/21/2022    Procedure: INSERTION, CATHETER, RIGHT HEART;  Surgeon: Dalia Crum MD;  Location: [facility] CATH LAB;  Service: Cardiology;  Laterality: Right;    RIGHT HEART CATHETERIZATION Right 10/31/2022    Procedure: INSERTION, CATHETER, RIGHT HEART;  Surgeon: Dalia Crum MD;  Location: [facility] CATH LAB;  Service: Cardiology;  Laterality: Right;    STERNAL WOUND CLOSURE N/A 6/30/2022    Procedure: CLOSURE, WOUND, STERNUM;  Surgeon: Luis F Paige MD;  Location: [address];  Service: Cardiovascular;  Laterality: N/A;     Family History   Problem Relation Age of Onset    Heart failure Father     Diverticulosis Brother     Heart attack Maternal Grandmother     Heart  attack Maternal Grandfather      Social History     Tobacco Use    Smoking status: Former     Packs/day: 0.50     Years: 16.00     Pack years: 8.00     Types: Cigarettes     Start date: 10/1/2004     Quit date: 2021     Years since quittin.6    Smokeless tobacco: Never   Substance Use Topics    Alcohol use: Not Currently    Drug use: Not Currently     Types: Marijuana, MDMA (Ecstacy)     Review of Systems   Constitutional:  Negative for chills and fever.   HENT:  Negative for rhinorrhea and sneezing.    Respiratory:  Negative for cough and shortness of breath.    Cardiovascular:  Negative for chest pain and leg swelling.   Gastrointestinal:  Negative for abdominal pain and nausea.   Endocrine: Positive for polydipsia.   Genitourinary:  Negative for dysuria and hematuria.   Musculoskeletal:  Negative for arthralgias and myalgias.   Neurological:  Negative for tremors and headaches.   Psychiatric/Behavioral:  Negative for agitation and confusion.      Physical Exam     Initial Vitals   BP Pulse Resp Temp SpO2   12/15/22 1740 12/15/22 1644 12/15/22 1644 12/15/22 1644 12/15/22 1644   (!) 87/65 100 18 97.5 °F (36.4 °C) 98 %      MAP       --                Physical Exam    Constitutional: He appears well-developed. He is not diaphoretic. No distress.   HENT:   Head: Normocephalic and atraumatic.   Eyes: Conjunctivae and EOM are normal. Right eye exhibits no discharge. Left eye exhibits no discharge.   Neck:   Normal range of motion.  Cardiovascular:  Normal rate.     Exam reveals no gallop and no friction rub.       RVAD placed 2022.    Pulmonary/Chest: Breath sounds normal. No respiratory distress.   Abdominal: Abdomen is soft. He exhibits no distension. There is no abdominal tenderness.   Musculoskeletal:         General: Normal range of motion.      Cervical back: Normal range of motion.     Neurological: He is alert and oriented to person, place, and time.   Skin: Skin is warm.       ED Course    Procedures  Labs Reviewed   CBC W/ AUTO DIFFERENTIAL   COMPREHENSIVE METABOLIC PANEL   BETA - HYDROXYBUTYRATE, SERUM   URINALYSIS, REFLEX TO URINE CULTURE   POCT GLUCOSE MONITORING CONTINUOUS   ISTAT CHEM8          Imaging Results              X-Ray Chest AP Portable (In process)                      Medications   potassium chloride SA CR tablet 40 mEq (has no administration in time range)     Medical Decision Making:   History:   Old Medical Records: I decided to obtain old medical records.  Initial Assessment:   37M with hx of RVAD and DM here with hyperglycemia. Glucose>600. He is asymptomatic at this time. He forgot his dose of insulin this morning.   Differential Diagnosis:   Hyperglycemia  CHF with RVAD  Clinical Tests:   Lab Tests: Ordered  Radiological Study: Ordered  Medical Tests: Ordered  ED Management:  We will obtain labs and start milrinone. We will replete potassium and recheck glucose. In the interim, a cardiology consultation is indicated given history of LVAD placement. Upon repeat labs insulin gtt to be started to control blood glucose.   Other:   I have discussed this case with another health care provider.                        Clinical Impression:   Final diagnoses:  [R73.9] Hyperglycemia               Otoniel Fried MD  Resident  12/15/22 [id number]

## 2022-12-15 NOTE — PROGRESS NOTES
"Date of Implant with Heartmate 3 LVAD: 6/29/22    PATIENT ARRIVED IN CLINIC:  Ambulatory   Accompanied by: wife and children  Complaints/reason for visit today: routine    Vitals  Temperature, oral:   Temp Readings from Last 1 Encounters:   12/15/22 98.1 °F (36.7 °C) (Oral)     Blood Pressure:   BP Readings from Last 3 Encounters:   12/15/22 (!) 90/0   10/31/22 (!) 98/0   10/31/22 (!) 90/0        VAD Interrogation:  TXP JOY INTERROGATIONS 12/15/2022 10/31/2022 10/31/2022   Type HeartMate3 - HeartMate3   Flow 3.6 - 3.8   Speed 5100 - 5100   PI 7.5 - 7.6   Power (Serna) 3.8 - 3.8   LSL 4700 - 4700   Pulsatility No Pulse Pulse Intermittent pulse     HCT:   Lab Results   Component Value Date    HCT 45.6 12/15/2022    HCT 29 (L) 07/23/2022       History Log: JxP  Problems / Issues / Alarms with VAD if any: None noted  VAD Sounds: HM3     VAD Binder With Patient: no   Reviewed VAD Numbers In Binder: no  Enrolled in Care Companion: yes    Equipment:  Emergency Equipment With Patient: yes   Any Equipment Issues: None noted   It is medically necessary to ensure patient has properly functioning equipment and wearables to prevent infection, injury or death to patient.     DLES Assessment:  Appearance Of Driveline: "1"  Antibiotics: NO  Velour: no  Manual & Visual Inspection Of Driveline: No kinks or tears noted  Stabilization Device In Use: yes, melendez securement device    Heartmate 3 Module Cable:  No yellow exposed and Unable to attempt to unlock due to being covered with tegaderm.  Educated pt on this.  He reports they check it at home.     Patient MyChart Questionnaire: No flowsheet data found.     Assessment:   PAIN: NO  Complaints Of Nausea / Vomiting: None noted    Appearance and Frequency Of Stools: normal and formed without blood & daily  Color Of Urine: clear/yellow  Coping/Depression/Anxiety: coping okay  Showering: Patient given permission to shower, educated on how to use the shower bag per , " instructed to cover dressing with Glad Press -n- Seal or Tegaderm, never hang shower bag in the shower, explained the reason for changing dressing immediately after drying off. All questions answered.  Activity/Exercise: walking   Driving: Yes. Reminded to pull over should there be an alarm before looking down at controller.    DLES Dressing Care:   Frequency of Dressing Changes: daily & daily kit  Pt In Need Of Management Kits?:yes -   1 Box of daily kit  It is medically necessary to have VAD management kits in order to prevent infection or to assist in the healing of an infected DLES.    Labs:    Chemistry        Component Value Date/Time     11/23/2022 1253     10/31/2022 1228    K 4.1 11/23/2022 1253    K 3.8 10/31/2022 1228     10/31/2022 1228    CO2 26 11/23/2022 1253    CO2 23 10/31/2022 1228    BUN 13.7 11/23/2022 1253    BUN 13 10/31/2022 1228    CREATININE 2.13 (H) 11/23/2022 1253    CREATININE 1.3 10/31/2022 1228     (H) 10/31/2022 1228        Component Value Date/Time    CALCIUM 9.9 11/23/2022 1253    CALCIUM 9.6 10/31/2022 1228    ALKPHOS 112 11/23/2022 1253    ALKPHOS 107 10/31/2022 1228    AST 28 11/23/2022 1253    AST 37 10/31/2022 1228    ALT 38 11/23/2022 1253    ALT 34 10/31/2022 1228    BILITOT 0.6 11/23/2022 1253    BILITOT 0.5 10/31/2022 1228    ESTGFRAFRICA >60.0 07/27/2022 1229    EGFRNONAA 54.2 (A) 07/27/2022 1229            Magnesium   Date Value Ref Range Status   10/31/2022 1.7 1.6 - 2.6 mg/dL Final       Lab Results   Component Value Date    WBC 9.92 12/15/2022    HGB 15.2 12/15/2022    HCT 45.6 12/15/2022    MCV 87 12/15/2022     12/15/2022       Lab Results   Component Value Date    INR 2.6 (H) 12/15/2022    INR 2.2 12/12/2022    INR 1.3 12/05/2022    PROTIME 20.9 (H) 11/23/2022    PROTIME 17.0 (H) 11/16/2022    PROTIME 22.7 (H) 08/30/2022       BNP   Date Value Ref Range Status   12/15/2022 91 0 - 99 pg/mL Final     Comment:     Values of less than 100  pg/ml are consistent with non-CHF populations.   10/31/2022 198 (H) 0 - 99 pg/mL Final     Comment:     Values of less than 100 pg/ml are consistent with non-CHF populations.   09/05/2022 90 0 - 99 pg/mL Final     Comment:     Values of less than 100 pg/ml are consistent with non-CHF populations.     Natriuretic Peptide   Date Value Ref Range Status   11/23/2022 71.9 <=100.0 pg/mL Final   11/16/2022 93.7 <=100.0 pg/mL Final       LD   Date Value Ref Range Status   12/15/2022 240 110 - 260 U/L Final     Comment:     Results are increased in hemolyzed samples.   10/31/2022 281 (H) 110 - 260 U/L Final     Comment:     Results are increased in hemolyzed samples.   09/06/2022 213 110 - 260 U/L Final     Comment:     Results are increased in hemolyzed samples.     Lactate Dehydrogenase   Date Value Ref Range Status   11/16/2022 266 (H) 125 - 220 U/L Final       Labs reviewed with patient: NO, chemistries not resulted after 2 hours, so let patient leave.      Medication reconciliation: per MA.  Medication Detail updated today: yes  Coumadin Managed by: [facility]    Education: Reviewed driveline care, emergency procedures, how to change the controller, alarms with patient, as well as discussed how to page the VAD coordinator in case of an emergency. Educated pt and significant other that he can have CPR if needed.  Both verbalized understanding and agreement.    Covid - 19 education: Reminded patient/caregiver to check temperature daily and call us if it is > 99.0.  Reminded them  to stay 6 feet away from other people, wash hands frequently, don't touch your face and stay home except to get labs, medications, and appts.    Covid Vaccine: Pt informed that we are encouraging all VAD patients to receive the COVID vaccine.  Informed pt that they can take tylenol but should avoid other NSAIDs.      Plans/Needs: Primacor infusing at 0.25 mcg/kg/min mixed in D5W.  Infusing via MELISSA PICC with blane MONTGOMERY.  BG from  12/13/22 >500 and was drawn from his PICC.   Waiting for his chemistries to be completed, but pt reports they stuck him today in the left arm.  After pt left clinic, lab called with a panic BG>600.  I called pt and asked him to go to the ER.  Pt verbalized understanding and agreement.  On call VC, Dr. Lombardi and Dr. Villatoro all notified.    Pt C/O MD that he has a rash on his back and wanted to know if it is from the investigational medication.  Vanesa, CRC, notified and came to clinic to see pt.  Refer to her detailed note, pt will continue medication.  Dr. Yang questioned if pt should be on GASTON trial seeing he had a RVAD post implant.  Vanesa to look into this further and get back with us.      Hurricane Season: No

## 2022-12-15 NOTE — TELEPHONE ENCOUNTER
Lab results this week resulted Tuesday which I was out of office.  Called pt today to review labs and medications with him.  Glucose >500.  No answer x 3 attempts.  PT due to come to clinic today.

## 2022-12-15 NOTE — LETTER
"Darlyn Wright was in the hospital with Kevan Queen (Julian) , who was seen and treated in our emergency department on 12/15/2022 and treated during hospitalization on 12/16/2022.  She may return to work on 12/17/2022.   If you have any questions or concerns, please don't hesitate to call.       Denise Espinoza PA-C  Heart Transplant Medicine  [facility] [place]  "

## 2022-12-15 NOTE — LETTER
December 16, 2022        Inderjit Hendricks  [address]  [address]  [place] [address]  Phone: [phone]  Fax: [phone]     Josh Pulido  [address] [address]  Phone: [phone]  Fax: [phone]             [id number]  [address]  [place] [address]  Phone: [phone]   Patient: Kevan Queen   MR Number: [id number]   YOB: 1985   Date of Visit: 12/15/2022       Dear Dr. Inderjit Hendricks, Josh Pulido    Thank you for referring Kevan Queen to me for evaluation. Attached you will find relevant portions of my assessment and plan of care.    If you have questions, please do not hesitate to call me. I look forward to following Kevan Queen along with you.    Sincerely,    Angela Yang, DO    Enclosure    If you would like to receive this communication electronically, please contact [email or web address] or [phone] to request [employer] access.    [employer] is a tool which provides read-only access to select patient information with whom you have a relationship. Its easy to use and provides real time access to review your patients record including encounter summaries, notes, results, and demographic information.    If you feel you have received this communication in error or would no longer like to receive these types of communications, please e-mail [email or web address]

## 2022-12-16 VITALS
TEMPERATURE: 99 F | WEIGHT: 209 LBS | BODY MASS INDEX: 24.18 KG/M2 | HEART RATE: 105 BPM | SYSTOLIC BLOOD PRESSURE: 88 MMHG | OXYGEN SATURATION: 99 % | HEIGHT: 78 IN | RESPIRATION RATE: 18 BRPM

## 2022-12-16 PROBLEM — F19.11 HISTORY OF SUBSTANCE ABUSE: Status: ACTIVE | Noted: 2022-12-16

## 2022-12-16 PROBLEM — R73.9 HYPERGLYCEMIA: Status: ACTIVE | Noted: 2022-12-16

## 2022-12-16 LAB
ALBUMIN SERPL BCP-MCNC: 3.8 G/DL (ref 3.5–5.2)
ALP SERPL-CCNC: 107 U/L (ref 55–135)
ALT SERPL W/O P-5'-P-CCNC: 24 U/L (ref 10–44)
ANION GAP SERPL CALC-SCNC: 9 MMOL/L (ref 8–16)
APTT BLDCRRT: 35.8 SEC (ref 21–32)
AST SERPL-CCNC: 25 U/L (ref 10–40)
BASOPHILS # BLD AUTO: 0.04 K/UL (ref 0–0.2)
BASOPHILS NFR BLD: 0.4 % (ref 0–1.9)
BILIRUB DIRECT SERPL-MCNC: 0.3 MG/DL (ref 0.1–0.3)
BILIRUB SERPL-MCNC: 1 MG/DL (ref 0.1–1)
BNP SERPL-MCNC: 37 PG/ML (ref 0–99)
BUN SERPL-MCNC: 17 MG/DL (ref 6–20)
CALCIUM SERPL-MCNC: 9.8 MG/DL (ref 8.7–10.5)
CHLORIDE SERPL-SCNC: 98 MMOL/L (ref 95–110)
CO2 SERPL-SCNC: 26 MMOL/L (ref 23–29)
CREAT SERPL-MCNC: 1.5 MG/DL (ref 0.5–1.4)
CRP SERPL-MCNC: 15 MG/L (ref 0–8.2)
DIFFERENTIAL METHOD: ABNORMAL
EOSINOPHIL # BLD AUTO: 0.1 K/UL (ref 0–0.5)
EOSINOPHIL NFR BLD: 1.3 % (ref 0–8)
ERYTHROCYTE [DISTWIDTH] IN BLOOD BY AUTOMATED COUNT: 15.1 % (ref 11.5–14.5)
EST. GFR  (NO RACE VARIABLE): >60 ML/MIN/1.73 M^2
ESTIMATED AVG GLUCOSE: 303 MG/DL (ref 68–131)
GLUCOSE SERPL-MCNC: 268 MG/DL (ref 70–110)
HBA1C MFR BLD: 12.2 % (ref 4–5.6)
HCT VFR BLD AUTO: 43.6 % (ref 40–54)
HGB BLD-MCNC: 14.7 G/DL (ref 14–18)
IMM GRANULOCYTES # BLD AUTO: 0.06 K/UL (ref 0–0.04)
IMM GRANULOCYTES NFR BLD AUTO: 0.6 % (ref 0–0.5)
INR PPP: 2.2 (ref 0.8–1.2)
LDH SERPL L TO P-CCNC: 224 U/L (ref 110–260)
LYMPHOCYTES # BLD AUTO: 2.9 K/UL (ref 1–4.8)
LYMPHOCYTES NFR BLD: 30.5 % (ref 18–48)
MAGNESIUM SERPL-MCNC: 2.2 MG/DL (ref 1.6–2.6)
MCH RBC QN AUTO: 29.2 PG (ref 27–31)
MCHC RBC AUTO-ENTMCNC: 33.7 G/DL (ref 32–36)
MCV RBC AUTO: 87 FL (ref 82–98)
MONOCYTES # BLD AUTO: 1.1 K/UL (ref 0.3–1)
MONOCYTES NFR BLD: 11.1 % (ref 4–15)
NEUTROPHILS # BLD AUTO: 5.3 K/UL (ref 1.8–7.7)
NEUTROPHILS NFR BLD: 56.1 % (ref 38–73)
NRBC BLD-RTO: 0 /100 WBC
PHOSPHATE SERPL-MCNC: 2.7 MG/DL (ref 2.7–4.5)
PLATELET # BLD AUTO: 255 K/UL (ref 150–450)
PMV BLD AUTO: 10.3 FL (ref 9.2–12.9)
POCT GLUCOSE: 237 MG/DL (ref 70–110)
POCT GLUCOSE: 241 MG/DL (ref 70–110)
POCT GLUCOSE: 244 MG/DL (ref 70–110)
POCT GLUCOSE: 249 MG/DL (ref 70–110)
POCT GLUCOSE: 262 MG/DL (ref 70–110)
POCT GLUCOSE: 268 MG/DL (ref 70–110)
POCT GLUCOSE: 273 MG/DL (ref 70–110)
POCT GLUCOSE: 279 MG/DL (ref 70–110)
POCT GLUCOSE: 293 MG/DL (ref 70–110)
POCT GLUCOSE: 310 MG/DL (ref 70–110)
POCT GLUCOSE: 311 MG/DL (ref 70–110)
POCT GLUCOSE: 340 MG/DL (ref 70–110)
POCT GLUCOSE: 455 MG/DL (ref 70–110)
POTASSIUM SERPL-SCNC: 4 MMOL/L (ref 3.5–5.1)
PREALB SERPL-MCNC: 25 MG/DL (ref 20–43)
PROT SERPL-MCNC: 8.2 G/DL (ref 6–8.4)
PROTHROMBIN TIME: 21.9 SEC (ref 9–12.5)
RBC # BLD AUTO: 5.04 M/UL (ref 4.6–6.2)
SODIUM SERPL-SCNC: 133 MMOL/L (ref 136–145)
WBC # BLD AUTO: 9.5 K/UL (ref 3.9–12.7)

## 2022-12-16 PROCEDURE — 99214 PR OFFICE/OUTPT VISIT, EST, LEVL IV, 30-39 MIN: ICD-10-PCS | Mod: ,,, | Performed by: NURSE PRACTITIONER

## 2022-12-16 PROCEDURE — 83615 LACTATE (LD) (LDH) ENZYME: CPT | Performed by: INTERNAL MEDICINE

## 2022-12-16 PROCEDURE — 99214 OFFICE O/P EST MOD 30 MIN: CPT | Mod: ,,, | Performed by: NURSE PRACTITIONER

## 2022-12-16 PROCEDURE — 85730 THROMBOPLASTIN TIME PARTIAL: CPT | Performed by: INTERNAL MEDICINE

## 2022-12-16 PROCEDURE — 84134 ASSAY OF PREALBUMIN: CPT | Performed by: INTERNAL MEDICINE

## 2022-12-16 PROCEDURE — 83735 ASSAY OF MAGNESIUM: CPT | Performed by: INTERNAL MEDICINE

## 2022-12-16 PROCEDURE — 63600175 PHARM REV CODE 636 W HCPCS: Performed by: INTERNAL MEDICINE

## 2022-12-16 PROCEDURE — 99225 PR SUBSEQUENT OBSERVATION CARE,LEVEL II: ICD-10-PCS | Mod: ,,, | Performed by: PHYSICIAN ASSISTANT

## 2022-12-16 PROCEDURE — 99226 PR SUBSEQUENT OBSERVATION CARE,LEVEL III: ICD-10-PCS | Mod: ,,, | Performed by: INTERNAL MEDICINE

## 2022-12-16 PROCEDURE — 85610 PROTHROMBIN TIME: CPT | Performed by: INTERNAL MEDICINE

## 2022-12-16 PROCEDURE — 25000003 PHARM REV CODE 250: Performed by: INTERNAL MEDICINE

## 2022-12-16 PROCEDURE — 84100 ASSAY OF PHOSPHORUS: CPT | Performed by: INTERNAL MEDICINE

## 2022-12-16 PROCEDURE — 90471 IMMUNIZATION ADMIN: CPT | Performed by: INTERNAL MEDICINE

## 2022-12-16 PROCEDURE — 83036 HEMOGLOBIN GLYCOSYLATED A1C: CPT | Performed by: NURSE PRACTITIONER

## 2022-12-16 PROCEDURE — 99226 PR SUBSEQUENT OBSERVATION CARE,LEVEL III: CPT | Mod: ,,, | Performed by: INTERNAL MEDICINE

## 2022-12-16 PROCEDURE — 25000003 PHARM REV CODE 250: Performed by: NURSE PRACTITIONER

## 2022-12-16 PROCEDURE — 96375 TX/PRO/DX INJ NEW DRUG ADDON: CPT

## 2022-12-16 PROCEDURE — 86140 C-REACTIVE PROTEIN: CPT | Performed by: INTERNAL MEDICINE

## 2022-12-16 PROCEDURE — 90686 IIV4 VACC NO PRSV 0.5 ML IM: CPT | Performed by: INTERNAL MEDICINE

## 2022-12-16 PROCEDURE — 36415 COLL VENOUS BLD VENIPUNCTURE: CPT | Performed by: INTERNAL MEDICINE

## 2022-12-16 PROCEDURE — 99225 PR SUBSEQUENT OBSERVATION CARE,LEVEL II: CPT | Mod: ,,, | Performed by: PHYSICIAN ASSISTANT

## 2022-12-16 PROCEDURE — 80076 HEPATIC FUNCTION PANEL: CPT | Performed by: INTERNAL MEDICINE

## 2022-12-16 PROCEDURE — 36415 COLL VENOUS BLD VENIPUNCTURE: CPT | Performed by: NURSE PRACTITIONER

## 2022-12-16 PROCEDURE — 96366 THER/PROPH/DIAG IV INF ADDON: CPT

## 2022-12-16 PROCEDURE — 96372 THER/PROPH/DIAG INJ SC/IM: CPT | Performed by: NURSE PRACTITIONER

## 2022-12-16 PROCEDURE — 85025 COMPLETE CBC W/AUTO DIFF WBC: CPT | Performed by: INTERNAL MEDICINE

## 2022-12-16 PROCEDURE — G0378 HOSPITAL OBSERVATION PER HR: HCPCS

## 2022-12-16 PROCEDURE — 80048 BASIC METABOLIC PNL TOTAL CA: CPT | Performed by: INTERNAL MEDICINE

## 2022-12-16 PROCEDURE — 83880 ASSAY OF NATRIURETIC PEPTIDE: CPT | Performed by: INTERNAL MEDICINE

## 2022-12-16 PROCEDURE — 27000248 HC VAD-ADDITIONAL DAY

## 2022-12-16 PROCEDURE — 63600175 PHARM REV CODE 636 W HCPCS: Performed by: NURSE PRACTITIONER

## 2022-12-16 RX ORDER — GLUCAGON 1 MG
1 KIT INJECTION
Status: DISCONTINUED | OUTPATIENT
Start: 2022-12-16 | End: 2022-12-16 | Stop reason: HOSPADM

## 2022-12-16 RX ORDER — WARFARIN 3 MG/1
TABLET ORAL
Qty: 38 TABLET | Refills: 11 | Status: ON HOLD | OUTPATIENT
Start: 2022-12-16 | End: 2023-04-06 | Stop reason: SDUPTHER

## 2022-12-16 RX ORDER — INSULIN ASPART 100 [IU]/ML
0-5 INJECTION, SOLUTION INTRAVENOUS; SUBCUTANEOUS
Status: DISCONTINUED | OUTPATIENT
Start: 2022-12-16 | End: 2022-12-16 | Stop reason: HOSPADM

## 2022-12-16 RX ORDER — HYDRALAZINE HYDROCHLORIDE 20 MG/ML
10 INJECTION INTRAMUSCULAR; INTRAVENOUS EVERY 6 HOURS PRN
Status: DISCONTINUED | OUTPATIENT
Start: 2022-12-16 | End: 2022-12-16 | Stop reason: HOSPADM

## 2022-12-16 RX ORDER — IBUPROFEN 200 MG
24 TABLET ORAL
Status: DISCONTINUED | OUTPATIENT
Start: 2022-12-16 | End: 2022-12-16 | Stop reason: HOSPADM

## 2022-12-16 RX ORDER — BLOOD SUGAR DIAGNOSTIC
1 STRIP MISCELLANEOUS 4 TIMES DAILY
Qty: 200 EACH | Refills: 11 | Status: ON HOLD | OUTPATIENT
Start: 2022-12-16 | End: 2023-04-06 | Stop reason: HOSPADM

## 2022-12-16 RX ORDER — INSULIN ASPART 100 [IU]/ML
12 INJECTION, SOLUTION INTRAVENOUS; SUBCUTANEOUS
Status: DISCONTINUED | OUTPATIENT
Start: 2022-12-16 | End: 2022-12-16

## 2022-12-16 RX ORDER — INSULIN ASPART 100 [IU]/ML
16 INJECTION, SOLUTION INTRAVENOUS; SUBCUTANEOUS
Qty: 18 ML | Refills: 11 | Status: ON HOLD | OUTPATIENT
Start: 2022-12-16 | End: 2023-08-08 | Stop reason: SDUPTHER

## 2022-12-16 RX ORDER — IBUPROFEN 200 MG
16 TABLET ORAL
Status: DISCONTINUED | OUTPATIENT
Start: 2022-12-16 | End: 2022-12-16 | Stop reason: HOSPADM

## 2022-12-16 RX ORDER — INSULIN ASPART 100 [IU]/ML
14 INJECTION, SOLUTION INTRAVENOUS; SUBCUTANEOUS
Status: DISCONTINUED | OUTPATIENT
Start: 2022-12-16 | End: 2022-12-16 | Stop reason: HOSPADM

## 2022-12-16 RX ADMIN — GABAPENTIN 100 MG: 100 CAPSULE ORAL at 08:12

## 2022-12-16 RX ADMIN — INSULIN ASPART 3 UNITS: 100 INJECTION, SOLUTION INTRAVENOUS; SUBCUTANEOUS at 05:12

## 2022-12-16 RX ADMIN — Medication 324 MG: at 08:12

## 2022-12-16 RX ADMIN — INSULIN ASPART 14 UNITS: 100 INJECTION, SOLUTION INTRAVENOUS; SUBCUTANEOUS at 05:12

## 2022-12-16 RX ADMIN — INSULIN ASPART 2 UNITS: 100 INJECTION, SOLUTION INTRAVENOUS; SUBCUTANEOUS at 12:12

## 2022-12-16 RX ADMIN — MILRINONE LACTATE IN DEXTROSE 0.25 MCG/KG/MIN: 200 INJECTION, SOLUTION INTRAVENOUS at 09:12

## 2022-12-16 RX ADMIN — WARFARIN SODIUM 3 MG: 3 TABLET ORAL at 05:12

## 2022-12-16 RX ADMIN — INSULIN ASPART 14 UNITS: 100 INJECTION, SOLUTION INTRAVENOUS; SUBCUTANEOUS at 12:12

## 2022-12-16 RX ADMIN — LEVETIRACETAM 1000 MG: 500 TABLET, FILM COATED ORAL at 08:12

## 2022-12-16 RX ADMIN — INFLUENZA VIRUS VACCINE 0.5 ML: 15; 15; 15; 15 SUSPENSION INTRAMUSCULAR at 05:12

## 2022-12-16 RX ADMIN — INSULIN DETEMIR 36 UNITS: 100 INJECTION, SOLUTION SUBCUTANEOUS at 10:12

## 2022-12-16 RX ADMIN — GABAPENTIN 100 MG: 100 CAPSULE ORAL at 05:12

## 2022-12-16 NOTE — PROGRESS NOTES
Study Title:  Antiplatelet Removal and HemocompatIbility EventS with the HeartMate 3 Pump   Protocol: [id number]  IRB #/Approval Date: [id number]  Sponsor: [employer]  : Dr. Luis F Paige   Others present: wife and 2 children     The subject is present for the Month 6 follow up visit. Subject reports there  are no visits and/or admissions to a non-[facility] facility. Protocol required data is collected and entered into the EDC, as appropriate, including  Pump Parameters, Vital Signs, Labs, 6 MHW, EQ-5D-5L Survey. An additional lab was completed as a send-out to the sponsor for trial specific core lab testing.  The subject verbalizes desired continuation in the trial. He mentioned that he has a rash down the middle of his back that began when he began taking the trial MELARA. I spoke w Dr. Sanderson and she told me to assure him that aspirin does not cause a rash. I reviewed with him and he expressed understanding.

## 2022-12-16 NOTE — ASSESSMENT & PLAN NOTE
-Admitted with hyperglycemia, glucose 500s on admit  -Insulin gtt   -Glucose improved to 268  -Endocrinology consulted, would appreciate their discharge recommendations

## 2022-12-16 NOTE — CONSULTS
[facility] - Cardiology Stepdown  Endocrinology  Diabetes Consult Note    Consult Requested by: Natalya Villatoro MD   Reason for admit: Type 2 diabetes mellitus with hyperglycemia    HISTORY OF PRESENT ILLNESS:  Reason for Consult: Management of T2DM, Hyperglycemia     Surgical Procedure and Date: LVAD 06/29/2022    Diabetes diagnosis year: 2022    Home Diabetes Medications:   -Levemir 36 units HS.   -Novolog 14 units TIDWM in addition to the following correction scale:     150 - 200 + 1 unit     201 - 250 + 2 units     251 - 300 + 3 units     301 - 350 + 4 units      > 350   + 5 units    How often checking glucose at home?  Uses [employer] William    BG readings on regimen: 200 or higher and occasionally had just reading of HIGH   Hypoglycemia on the regimen?  No  Missed doses on regimen?  Yes, occasionally skips breakfast do to sleeping in and will skip Novolog with breakfast     Diabetes Complications include:     Hyperglycemia    Complicating diabetes co morbidities:   History of MI, CHF, and CKD      HPI:   Patient is a 37 y.o. male with a diagnosis of NICM status post DT-HM3 implantation 6/23/2022 with early RV failure requiring RVAD with ProTek Duo after admitted with ADHF/cardiogenic shock on home milrinone requiring IABP. He underwent RVAD removal and chest closure 6/30/2022.  He also completed a course of IV Abx for staph epi.  He was eventually transitioned to milrinone (secondary to  shortage) now on 0.25 mcg/kg/min.      Patient presents to the ER at the recommendation of his transplant coordinator due to elevated glucose.  He has been compliant with his medications as well as diet.  In the ER his glucose on CMP noted to be 600, however this was drawn from his PICC line in which D5 is running with his Milrinone.  Followup fingerstick was noted to be in the 500s.  Repeat CMP is now pending from a different site. No DKA or HHS noted. Endocrinology consulted for T2DM/hyperglycemia management.      Lab Results    Component Value Date    HGBA1C 7.4 (H) 09/01/2022             Interval HPI:   Overnight events:  Remains in CSU. BG has trended down since starting IV intensive insulin protocol but remains above goal ranges this morning. IV insulin infusion rates ranging from 0.6-2 u/hr. Diet diabetic [facility]; 2000 Calorie; Fluid - 1500mL    Eating:    diet progressed this morning   Nausea: No  Hypoglycemia and intervention: No  Fever: No  TPN and/or TF: No  If yes, type of TF/TPN and rate: n/a    PMH, PSH, FH, SH reviewed     ROS:  Constitutional: Negative for weight changes.  Eyes: Negative for visual disturbance.  Respiratory: Negative for cough.   Cardiovascular: Negative for chest pain.  Gastrointestinal: Negative for nausea.  Endocrine: Negative for polyuria, polydipsia.  Musculoskeletal: Negative for back pain.  Skin: Negative for rash.  Neurological: Negative for syncope.  Psychiatric/Behavioral: Negative for depression.      Review of Systems    Current Medications and/or Treatments Impacting Glycemic Control  Immunotherapy:    Immunosuppressants       None          Steroids:   Hormones (From admission, onward)      None          Pressors:    Autonomic Drugs (From admission, onward)      None          Hyperglycemia/Diabetes Medications:   Antihyperglycemics (From admission, onward)      Start     Stop Route Frequency Ordered    12/16/22 1130  insulin aspart U-100 pen 12 Units         -- SubQ 3 times daily with meals 12/16/22 0953    12/16/22 1052  insulin aspart U-100 pen 0-5 Units         -- SubQ Before meals & nightly PRN 12/16/22 0953    12/16/22 1000  insulin detemir U-100 pen 36 Units         -- SubQ Daily 12/16/22 0953             PHYSICAL EXAMINATION:  Vitals:    12/16/22 0735   BP: (!) 86/0   Pulse:    Resp:    Temp:      Body mass index is 24.15 kg/m².    Physical Exam  Constitutional: Well developed, well nourished, NAD.  ENT: External ears no masses with nose patent; normal hearing.  Neck: Supple;  trachea midline.  Cardiovascular: Normal heart sounds  Lungs: Normal effort; lungs anterior bilaterally clear to auscultation.  Abdomen: Soft, no masses, no hernias.  MS: No clubbing or cyanosis of nails noted; unable to assess gait.  Skin: No rashes, lesions, or ulcers; no nodules. Injection sites are ok. No lipo hypertropthy or atrophy.  Psychiatric: Good judgement and insight; normal mood and affect.  Neurological: Cranial nerves are grossly intact.   Foot: Nails in good condition, no amputations noted.        Labs Reviewed and Include   Recent Labs   Lab 12/16/22  0636   *   CALCIUM 9.8   ALBUMIN 3.8   PROT 8.2   *   K 4.0   CO2 26   CL 98   BUN 17   CREATININE 1.5*   ALKPHOS 107   ALT 24   AST 25   BILITOT 1.0     Lab Results   Component Value Date    WBC 9.50 12/16/2022    HGB 14.7 12/16/2022    HCT 43.6 12/16/2022    MCV 87 12/16/2022     12/16/2022     No results for input(s): TSH, FREET4 in the last 168 hours.  Lab Results   Component Value Date    HGBA1C 7.4 (H) 09/01/2022       Nutritional status:   Body mass index is 24.15 kg/m².  Lab Results   Component Value Date    ALBUMIN 3.8 12/16/2022    ALBUMIN 4.1 12/15/2022    ALBUMIN 4.3 12/15/2022     Lab Results   Component Value Date    PREALBUMIN 25 12/16/2022    PREALBUMIN 30 12/15/2022    PREALBUMIN 30 10/31/2022       Estimated Creatinine Clearance: 87.2 mL/min (A) (based on SCr of 1.5 mg/dL (H)).    Accu-Checks  Recent Labs     12/16/22  0000 12/16/22  0100 12/16/22  0201 12/16/22  0300 12/16/22  0410 12/16/22  0511 12/16/22  0609 12/16/22  0705 12/16/22  0803 12/16/22  0906   POCTGLUCOSE 268* 310* 340* 293* 249* 273* 244* 237* 241* 311*        ASSESSMENT and PLAN    * Type 2 diabetes mellitus with hyperglycemia  BG goal 140-180    Notified by primary team that patient will likely be discharging today.    Plan:  -Discontinue IV intensive insulin protocol  -Start Levemir 36 units daily (home dosing)  -Start Novolog 14 units TID with  meals (home dosing)  -Low Dose Correction Scale  -BG monitoring ac/hs      Discharge plans:  -Patient reporting high BG readings at home (into the 500s). Recommend the following adjustements to home regimen with close follow up:    -Change Levemir to 22 units BID   -Increase Novolog to 16 units TID with meals in addition to the following correction scale   150 - 200 + 1 unit  201 - 250 + 2 units  251 - 300 + 3 units  301 - 350 + 4 units   > 350   + 5 units    - Insurance preferred diabetes testing supplies. Recommend [employer] William 2 CGM. Patient has diabetes mellitus and manages diabetes with intensive insulin regimen with three or more insulin injections daily or CSII.  -Patient requires a therapeutic CGM and is willing to use therapeutic CGM for the necessary frequent adjustments of insulin therapy. Patient has been using SMBG for frequent glucose monitoring (4x/day). I have completed an in-person visit during the previous 6 months and will continue to have in-person visits every 6 months to assess adherence to their CGM regimen and diabetes treatment plan.    - Patient is to keep blood sugar logs, monitor BG at least 4 times daily, and follow-up with PCP for continued DM management and care. Patient reports next scheduled apt with PCP is 12/19. Instructed to notify [facility] [place] [place] endocrine clinic or PCP for any BG < 80 or consistently >200. Reviewed when to hold insulin, how the correction scale works, insulin dosing adjustments, s/s and treatment of hypoglycemia, and importance of follow up. Patient verabalized understanding.               LVAD (left ventricular assist device) present    Managed per primary team  Optimize BG control          Plan discussed with patient at bedside.     Jody Starkey, NP  Endocrinology  [facility] - Cardiology Stepdown

## 2022-12-16 NOTE — H&P
[facility] [place]  H&  Heart Transplant Service      Kevan Queen  YOB: 1985  Medical Record Number:  [id number]  Attending Physician:  Natalya Villatoro MD   Date of Admission: 12/15/2022       Hospital Day:  0  Current Principal Problem:  <principal problem not specified>      History     Cc:  Hyperglycemia    HPI  Kevan Queen is a 37 y.o.  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 He underwent RVAD removal and chest closure 6/30/2022.  He also completed a course of IV Abx for staph epi. He was weaned off  but he had to restarted due to RVF. He was eventually transitioned to milrinone (secondary to  shortage) now on 0.25 mcg/kg/min.  Patient presents to the ER at the recommendation of his transplant coordinator due to elevated glucose.  He has been compliant with his medications as well as diet.  He denies any symptoms or heart failure such as orthopnea, PND, lower extremity swelling, dyspnea at rest or on exertion.  He also denies any chest discomfort such as pain, pressure, or tightness as well as any headaches, lightheaded, dizziness, nausea, vomiting, or diarrhea.  He has been taking his Lasix 80 mg daily as instructed and monitoring his oral intake.  In the ER his glucose on CMP noted to be 600, however this was drawn from his PICC line in which D5 is running with his Milrinone.  Followup fingerstick was noted to be in the 500s.  Repeat CMP is now pending from a different site.  Patient feels well currently.  Endocrinology consulted for hyperglycemia management.  Patient does have a history of HHS as well as seizures of unclear etiology however there was a concern that it was possibly due to hypoglycemia versus alcohol.    Heartmate 3 RPM 5100     INR goal: 2-3   Bridge with  Heparin/lovenox   Antiplatelets: Alon trial   Inotropes: Milrinone @ 0.25 mcg/kg/min  Co-morbidities: history of polysubstance abuse, recently diagnosed DM2 complicated by HHS, episodes of unclear syncope vs seizure      Medications - Outpatient  Prior to Admission medications    Medication Sig Start Date End Date Taking? Authorizing Provider   milrinone 20mg/100ml D5W, 200mcg/ml, (PRIMACOR) 20 mg/100 mL (200 mcg/mL) infusion Inject 34.875 mcg/min into the vein continuous. 4/25/22  Yes Jeff Spencer PA-C   acetaminophen (TYLENOL) 500 MG tablet Take 500 mg by mouth every 6 (six) hours as needed for Pain.    Historical Provider   albuterol (PROVENTIL/VENTOLIN HFA) 90 mcg/actuation inhaler INHALE 2 PUFFS BY MOUTH EVERY 4 HOURS AS NEEDED FOR COUGH OR WHEEZING 11/24/20   Historical Provider   blood sugar diagnostic Strp Use to test blood glucose 4 (four) times daily. 5/27/22   Luca Lopez Jr., MD   blood-glucose meter Misc Use as instructed 5/27/22   Luca Lopez Jr., MD   busPIRone (BUSPAR) 7.5 MG tablet Take 1 tablet (7.5 mg total) by mouth once daily at 6am. 9/11/22 9/11/23  Dalia Crum MD   famotidine (PEPCID) 40 MG tablet Take 1 tablet (40 mg total) by mouth once daily. 9/11/22 9/11/23  Dalia Crum MD   ferrous gluconate 324 mg (37.5 mg iron) Tab tablet Take 1 tablet (324 mg total) by mouth daily with breakfast. 9/11/22 9/11/23  Dalia Crum MD   furosemide (LASIX) 80 MG tablet Take 1 tablet (80 mg total) by mouth once daily. 8/18/22 8/18/23  Josh Pulido MD   gabapentin (NEURONTIN) 100 MG capsule Take 1 capsule (100 mg total) by mouth 3 (three) times daily. 9/11/22 9/11/23  Dalia Crum MD   gabapentin (NEURONTIN) 300 MG capsule Take 1 capsule (300 mg total) by mouth 3 (three) times daily. 9/6/22 9/6/23  Dalia Crum MD   insulin aspart U-100 (NOVOLOG) 100 unit/mL (3 mL) InPn pen Inject 14 Units into the skin 3 (three) times daily. 9/6/22 9/6/23  Dalia Crum MD  "  insulin detemir U-100 (LEVEMIR FLEXTOUCH) 100 unit/mL (3 mL) SubQ InPn pen Inject 36 Units into the skin every evening. 9/6/22 9/6/23  Dalia Crum MD   INV ASPIRIN 100MG/PLACEBO Take 100 mg by mouth once daily. FOR INVESTIGATIONAL USE ONLY 12/15/22   Angela Yang DO   lancets 33 gauge Misc Use to test blood glucose 4 (four) times daily. 5/27/22   Luca Lopez Jr., MD   levETIRAcetam (KEPPRA) 1000 MG tablet Take 1 tablet (1,000 mg total) by mouth 2 (two) times daily. 9/11/22 9/11/23  Dalia Crum MD   methocarbamoL (ROBAXIN) 500 MG Tab Take 1 tablet (500 mg total) by mouth 4 (four) times daily as needed (muscle pain). 9/6/22   Dalia Crum MD   milrinone (PRIMACOR) 1 mg/mL injection Inject into the vein. 9/7/22   Historical Provider   mirtazapine (REMERON) 15 MG tablet Take 1 tablet (15 mg total) by mouth nightly. 9/11/22   Dalia Crum MD   omega-3 acid ethyl esters (LOVAZA) 1 gram capsule Take 2 capsules (2 g total) by mouth 2 (two) times daily. 7/27/22 7/27/23  Josh Pulido MD   pen needle, diabetic 31 gauge x 1/4" Ndle 1 Device by Misc.(Non-Drug; Combo Route) route 4 (four) times daily. 5/27/22   Luca Lopez Jr., MD   potassium chloride SA (K-DUR,KLOR-CON) 20 MEQ tablet Take 2 tablets (40 mEq total) by mouth 3 (three) times daily. 9/11/22   Dalia Crum MD   senna-docusate 8.6-50 mg (SENNA-S) 8.6-50 mg per tablet Take 2 tablets by mouth once daily. 7/27/22   Josh Pulido MD   spironolactone (ALDACTONE) 25 MG tablet Take 1 tablet (25 mg total) by mouth once daily. 9/11/22 9/11/23  Dalia Crum MD   warfarin (COUMADIN) 3 MG tablet Take 4.5 mg orally daily on Mondays and Thursdays. Take 3mg orally daily on other days. 9/6/22   Dalia Crum MD   digoxin (LANOXIN) 125 mcg tablet Take 1 tablet (0.125 mg total) by mouth once daily. 4/26/22 5/27/22  Jeff Spencer PA-C   EScitalopram oxalate (LEXAPRO) 5 MG Tab Take 1 tablet (5 mg total) by mouth once " daily. 5/27/22 7/27/22  Luca Lopez Jr., MD   insulin (LANTUS SOLOSTAR U-100 INSULIN) glargine 100 units/mL (3mL) SubQ pen Inject 10 Units into the skin every evening.  Patient not taking: Reported on 5/24/2022 5/20/22 5/27/22  Bautista Lynch III, MD   INV ASPIRIN 100MG/PLACEBO Take 1 capsule (100mg ) by mouth once daily. FOR INVESTIGATIONAL USE ONLY. Protocol: GASTON III subject ID: [id number] 10/31/22 12/15/22  Natalya Villatoro MD   metOLazone (ZAROXOLYN) 2.5 MG tablet Take 2.5 mg by mouth every other day. 11/25/21 4/25/22  Historical Provider   metoprolol succinate (TOPROL-XL) 25 MG 24 hr tablet TAKE 1 TABLET(25 MG) BY MOUTH EVERY DAY 5/19/21 4/25/22  Luca Lopez Jr., MD   pantoprazole (PROTONIX) 40 MG tablet Take 1 tablet (40 mg total) by mouth once daily. 5/27/22 7/27/22  Luca Lopez Jr., MD         Medications - Current  Scheduled Meds:   [START ON 12/16/2022] ferrous gluconate  324 mg Oral Daily with breakfast    gabapentin  100 mg Oral TID    levETIRAcetam  1,000 mg Oral BID    mirtazapine  15 mg Oral Nightly    potassium chloride  40 mEq Oral Once    [START ON 12/17/2022] warfarin  3 mg Oral Every Tues, Thurs, Sat, Sun     Continuous Infusions:   milrinone 20mg/100ml D5W (200mcg/ml)       PRN Meds:.acetaminophen, methocarbamoL      Allergies  Review of patient's allergies indicates:   Allergen Reactions    Aspirin Other (See Comments)     Mr. Thacker is enrolled in Dr. Paige's Gaston Trial and cannot have any aspirin and/or aspirin-containing products. DO NOT cancel any orders for INV Aspirin 100 mg/Placebo. If you have questions, please contact Isabel @ [phone], [email or web address], secure chat or MS Teams message.    Bumex [bumetanide] Hives    Lactose Diarrhea     Other reaction(s): Abdominal distension, gaseous    Torsemide Hives         Past Medical History  Past Medical History:   Diagnosis Date    Arthritis     Cardiomyopathy     CHF (congestive heart failure)  10/01/2020    Diabetes mellitus     Dilated cardiomyopathy 10/26/2020    Drug abuse 10/2020    Hyperosmolar hyperglycemic state (HHS) 5/25/2022    ICD (implantable cardioverter-defibrillator) in place 10/26/2020    Muscle cramping 6/15/2022    Renal disorder          Past Surgical History  Past Surgical History:   Procedure Laterality Date    APPLICATION OF WOUND VACUUM-ASSISTED CLOSURE DEVICE N/A 6/30/2022    Procedure: APPLICATION, WOUND VAC;  Surgeon: Luis F Paige MD;  [facility];  Service: Cardiovascular;  Laterality: N/A;  50 x 5 cm    CARDIAC DEFIBRILLATOR PLACEMENT      IMPLANTATION OF RIGHT VENTRICULAR ASSIST DEVICE (RVAD) N/A 6/29/2022    Procedure: INSERTION, RVAD;  Surgeon: Luis F Paige MD;  [facility]: [facility];  Service: Cardiovascular;  Laterality: N/A;    INSERTION OF GRAFT TO PERICARDIUM Right 6/30/2022    Procedure: INSERTION-RIGHT VENTRICULAR ASSIST DEVICE;  Surgeon: Luis F Paige MD;  Location: [facility];  Service: Cardiovascular;  Laterality: Right;    IRRIGATION OF MEDIASTINUM  6/30/2022    Procedure: IRRIGATION, MEDIASTINUM;  Surgeon: Luis F Paige MD;  Location: [facility];  Service: Cardiovascular;;    LEFT VENTRICULAR ASSIST DEVICE Left 6/23/2022    Procedure: INSERTION-LEFT VENTRICULAR ASSIST DEVICE;  Surgeon: Luis F Paige MD;  Location: [facility];  Service: Cardiovascular;  Laterality: Left;    LEFT VENTRICULAR ASSIST DEVICE N/A 6/29/2022    Procedure: INSERTION-LEFT VENTRICULAR ASSIST DEVICE;  Surgeon: Luis F Paige MD;  Location: [facility] OR [facility];  Service: Cardiovascular;  Laterality: N/A;    RIGHT HEART CATHETERIZATION Right 4/8/2022    Procedure: INSERTION, CATHETER, RIGHT HEART;  Surgeon: Luca Lopez Jr., MD;  Location: [facility] CATH LAB;  Service: Cardiology;  Laterality: Right;    RIGHT HEART CATHETERIZATION Right 4/19/2022    Procedure: INSERTION, CATHETER, RIGHT HEART;  Surgeon: Josh Pulido MD;  Location: [facility] CATH LAB;   Service: Cardiology;  Laterality: Right;    RIGHT HEART CATHETERIZATION Right 2022    Procedure: INSERTION, CATHETER, RIGHT HEART;  Surgeon: Dalia Crum MD;  Location: [facility] CATH LAB;  Service: Cardiology;  Laterality: Right;    RIGHT HEART CATHETERIZATION Right 10/31/2022    Procedure: INSERTION, CATHETER, RIGHT HEART;  Surgeon: Dalia Crum MD;  Location: [facility] CATH LAB;  Service: Cardiology;  Laterality: Right;    STERNAL WOUND CLOSURE N/A 2022    Procedure: CLOSURE, WOUND, STERNUM;  Surgeon: Luis F Paige MD;  Location: [facility] OR [facility];  Service: Cardiovascular;  Laterality: N/A;         Social History  Social History     Socioeconomic History    Marital status: Legally    Tobacco Use    Smoking status: Former     Packs/day: 0.50     Years: 16.00     Pack years: 8.00     Types: Cigarettes     Start date: 10/1/2004     Quit date: 2021     Years since quittin.6    Smokeless tobacco: Never   Substance and Sexual Activity    Alcohol use: Not Currently    Drug use: Not Currently     Types: Marijuana, MDMA (Ecstacy)    Sexual activity: Yes     Partners: Female     Birth control/protection: None         ROS   Admits Denies   Constitutional  Chills, diaphoresis, malaise   Eyes  Visual changes   ENMT  Dysphagia, Epistaxis, nasal congestion, hearing loss   Cardiovascular  Chest pain, palpitations, edema   Respiratory  Cough, dyspnea, wheezing   Gastrointestinal  Nausea, vomiting, constipation, diarrhea, anorexia.     Genitourinary  Dysuria, incontinence   Musculoskeletal  Myalgias, joint pain, joint swelling   Integumentary  Rash, inflammation, burning   Neruo-Psychiatric  Anxiety, insomnia.  Changes in speech, strength, sensation.     Endocrine     Hematologic  Abnormal bruising, bleeding   Immunologic  Inflammation, pain at IV sites.  Pruritis.         Physical Examination         Vital Signs  Vitals  Temp: 97.5 °F (36.4 °C)  Temp src: Oral  Pulse: 100  Resp: 18  SpO2: 98 %  BP: (!)  87/65  BP Location: Right arm  BP Method: (S) Doppler  Patient Position: Sitting            24 Hour VS Range    Temp:  [97.5 °F (36.4 °C)-98.1 °F (36.7 °C)]   Pulse:  [100]   Resp:  [18]   BP: (87-90)/(0-65)   SpO2:  [98 %]   No intake or output data in the 24 hours ending 12/15/22 1857      General:  Sitting in bed no acute distress  Head: NCAT  Eyes: conjunctivae and lids normal, no scleral icterus, EOMI.  ENMT:  no gingival bleeding, normal oral mucosa without pallor or cyanosis.   Neck:  JVP normal.  Trachea non-displaced.     Chest:  Normal respiratory effort.  Chest clear to auscultation.  No wheezes, rales, or rhonchi.  Heart:  LVAD hum noted.  Nonpulsatile.  Abdomen:  Non-distended, normal bowel sounds, non-tender.  No hepato-jugular reflux.  Extremities:  No edema. Normal capillary refill.    Skin:  Maculopapular hyperpigmented rash on back.  Warm and dry.  No cyanosis or pallor.  No ulcers, stasis.  IV sites without tenderness or inflammation.    Neurological / Psychiatric:  Oriented to person, time, and place.  No facial asymmetry, drift.  Fluent without dysarthria.  Mood euthymic, affect normal.         Data       Recent Labs   Lab 12/15/22  1402 12/15/22  1806 12/15/22  1818   WBC 9.92 10.24  --    HGB 15.2 14.3  --    HCT 45.6 41.6 47    276  --         Recent Labs   Lab 12/12/22  0000 12/15/22  1402   INR 2.2 2.6*        Recent Labs   Lab 12/15/22  1402 12/15/22  1806   *  --    K 3.7 4.0   CL 89* 91*   CO2 27  --    BUN 18  --    CREATININE 1.8*  --    ANIONGAP 11  --    CALCIUM 10.2 9.8        Recent Labs   Lab 12/15/22  1402 12/15/22  1806   PROT 9.1*  --    ALBUMIN 4.3 4.1   BILITOT 1.0  --    ALKPHOS 130  --    AST 31  --    ALT 32  --         No results for input(s): TROPONINI in the last 168 hours.     BNP (pg/mL)   Date Value   12/15/2022 91   10/31/2022 198 (H)   09/05/2022 90   09/02/2022 43   09/01/2022 33     Natriuretic Peptide (pg/mL)   Date Value   11/23/2022 71.9    11/16/2022 93.7             Assessment & Plan   chantel Queen is a 37 y.o.  male with a past medical history remarkable for NICM status post DT-HM3 implantation 6/23/2022 with early RV failure and uncontrolled diabetes mellitus who presents to hospital secondary to hyperglycemia.    Hyperglycemia:  Patient does not appear to be in DKA or HHS at this time.  -spoke with Endocrinology, they will manage his hyperglycemia with insulin drip.  Hopefully this can be controlled by the morning and patient go home tomorrow.  -repeat CMP pending    Hyponatremia  -secondary to hypoglycemia.  -will follow-up with repeat CMP    Chronic systolic/diastolic biventricular failure  -on home Milrinone 0.25 mcg/kg/min., will continue.  Patient is not in acute exacerbation.  If anything patient appears dry.  -will resume home medications with the exception of his Lasix for tonight  Echo 09/01/2022:  Summary    There is an LVAD present. Base speed is 5100 RPMs. The pump type is a Heartmate III. The interventricular septum appears midline. The aortic valve does not open.  The left ventricle is mildly enlarged with severely decreased systolic function.  The estimated ejection fraction is 10%. There is left ventricular global hypokinesis.  Left ventricular diastolic dysfunction.  Severe right ventricular enlargement with moderately to severely reduced right ventricular systolic function.  The estimated PA systolic pressure is 22 mmHg.  Normal central venous pressure (3 mmHg).    Acute kidney injury  -possibly secondary to dehydration.  Will hold home Lasix for tonight.  -follow-up with repeat BNP    Left ventricular assist device present:  Procedure: Device Interrogation Including analysis of device parameters  Current Settings: Ventricular Assist Device  Review of device function is stable/unstable stable    TXP LVAD INTERROGATIONS 12/15/2022 10/31/2022 10/31/2022 10/31/2022 9/6/2022 9/6/2022 9/6/2022   Type HeartMate3  HeartMate3 HeartMate3 - - - HeartMate3   Flow 3.6 3.8 3.7 - 3.5 3.43 3.3   Speed 5100 5100 5150 - 5100 5100 5100   PI 7.7 7.2 6.6 - 7.8 8.3 8.0   Power (Serna) 3.9 3.8 3.7 - 3.8 3.9 3.8   LSL - - - - - 4700 4700   Low Flow Alarm - - - - - - -   High Power Alarm - - - - - - -   Pulsatility No Pulse Pulse - Intermittent pulse - - -     History of seizure  -continue Keppra    Status post ICD implant      Signed:  Wilmer Daniel M.D.  Page # [phone]  Cardiovascular Fellow  [facility]

## 2022-12-16 NOTE — PROGRESS NOTES
[facility] - Cardiology Stepdown  Heart Transplant  Progress Note    Patient Name: Kevan Queen  MRN: [id number]  Admission Date: 12/15/2022  Hospital Length of Stay: 0 days  Attending Physician: Natalya Villatoro MD  Primary Care Provider: ORALIA Cline  Principal Problem:<principal problem not specified>    Subjective:     Interval History: Admitted overnight with hyperglycemia (glucose in 500s), now improved 268 (pt's baseline). On insulin gtt. Would appreciate Endocrinology's recommendations for discharge medications.     Continuous Infusions:   milrinone 20mg/100ml D5W (200mcg/ml) 0.25 mcg/kg/min (12/16/22 0914)     Scheduled Meds:   ferrous gluconate  324 mg Oral Daily with breakfast    gabapentin  100 mg Oral TID    insulin aspart U-100  12 Units Subcutaneous TIDWM    insulin detemir U-100  36 Units Subcutaneous Daily    levETIRAcetam  1,000 mg Oral BID    mirtazapine  15 mg Oral Nightly    warfarin  3 mg Oral Once per day on Wed Fri    [START ON 12/17/2022] warfarin  4.5 mg Oral Once per day on Sun Mon Tue Thu Sat     PRN Meds:acetaminophen, glucagon (human recombinant), glucose, glucose, hydrALAZINE, insulin aspart U-100, methocarbamoL    Review of patient's allergies indicates:   Allergen Reactions    Aspirin Other (See Comments)     Mr. Thacker is enrolled in Dr. Paige's Alon Trial and cannot have any aspirin and/or aspirin-containing products. DO NOT cancel any orders for INV Aspirin 100 mg/Placebo. If you have questions, please contact Isabel @ [phone], [phone],[email or web address], secure chat or MS Teams message.    Bumex [bumetanide] Hives    Lactose Diarrhea     Other reaction(s): Abdominal distension, gaseous    Torsemide Hives     Objective:     Vital Signs (Most Recent):  Temp: 96.9 °F (36.1 °C) (12/16/22 0723)  Pulse: 109 (12/16/22 0723)  Resp: 18 (12/16/22 0723)  BP: 111/78 (12/16/22 0500)  SpO2: 99 % (12/16/22 0723) Vital Signs (24h Range):  Temp:  [96.9 °F (36.1 °C)-98.1  °F (36.7 °C)] 96.9 °F (36.1 °C)  Pulse:  [] 109  Resp:  [18-20] 18  SpO2:  [98 %-100 %] 99 %  BP: ()/(0-82) 111/78     Patient Vitals for the past 72 hrs (Last 3 readings):   Weight   12/16/22 0814 94.8 kg (208 lb 15.9 oz)   12/15/22 2145 100.2 kg (220 lb 14.4 oz)   12/15/22 1644 96.6 kg (213 lb)     Body mass index is 24.15 kg/m².      Intake/Output Summary (Last 24 hours) at 12/16/2022 1037  Last data filed at 12/16/2022 0104  Gross per 24 hour   Intake --   Output 350 ml   Net -350 ml       Hemodynamic Parameters:           Physical Exam  Vitals and nursing note reviewed.   HENT:      Head: Normocephalic.      Nose: Nose normal.   Eyes:      Conjunctiva/sclera: Conjunctivae normal.   Cardiovascular:      Rate and Rhythm: Normal rate and regular rhythm.   Pulmonary:      Effort: Pulmonary effort is normal.      Breath sounds: Normal breath sounds.   Abdominal:      General: Abdomen is flat.   Musculoskeletal:         General: Normal range of motion.      Cervical back: Normal range of motion.      Right lower leg: No edema.      Left lower leg: No edema.   Skin:     General: Skin is warm.      Capillary Refill: Capillary refill takes less than 2 seconds.   Neurological:      General: No focal deficit present.      Mental Status: He is alert.   Psychiatric:         Mood and Affect: Mood normal.         Behavior: Behavior normal.         Thought Content: Thought content normal.         Judgment: Judgment normal.       Significant Labs:  CBC:  Recent Labs   Lab 12/15/22  1402 12/15/22  1806 12/15/22  1818 12/16/22  0636   WBC 9.92 10.24  --  9.50   RBC 5.26 4.81  --  5.04   HGB 15.2 14.3  --  14.7   HCT 45.6 41.6 47 43.6    276  --  255   MCV 87 87  --  87   MCH 28.9 29.7  --  29.2   MCHC 33.3 34.4  --  33.7     BNP:  Recent Labs   Lab 12/15/22  1402 12/16/22  0636   BNP 91 37     CMP:  Recent Labs   Lab 12/15/22  1402 12/15/22  1806 12/16/22  0636   * 522* 268*   CALCIUM 10.2 9.8 9.8    ALBUMIN 4.3 4.1 3.8   PROT 9.1* 8.6* 8.2   * 128* 133*   K 3.7 4.0 4.0   CO2 27 26 26   CL 89* 91* 98   BUN 18 18 17   CREATININE 1.8* 1.8* 1.5*   ALKPHOS 130 114 107   ALT 32 28 24   AST 31 27 25   BILITOT 1.0 1.1* 1.0      Coagulation:   Recent Labs   Lab 12/12/22  0000 12/15/22  1402 12/16/22  0636   INR 2.2 2.6* 2.2*   APTT  --   --  35.8*     LDH:  Recent Labs   Lab 12/15/22  1402 12/16/22  0636    224     Microbiology:  Microbiology Results (last 7 days)       ** No results found for the last 168 hours. **            I have reviewed all pertinent labs within the past 24 hours.    Estimated Creatinine Clearance: 87.2 mL/min (A) (based on SCr of 1.5 mg/dL (H)).    Diagnostic Results:  I have reviewed all pertinent imaging results/findings within the past 24 hours.    Assessment and Plan:     No notes on file    Hyperglycemia  -Admitted with hyperglycemia, glucose 500s on admit  -Insulin gtt   -Glucose improved to 268  -Endocrinology consulted, would appreciate their discharge recommendations    History of heart assist device  -HeartMate 3 Implanted 6/23/2022 with post operative RVF on IV milrinone 0.25   -Continue Coumadin, Goal INR 2.0-3.0. Therapeutic today.   -Antiplatelets Not on ASA  -LDH is stable overall today. Will continue to monitor daily.  -Speed set at 5100, LSL 4700 rpm  -Interrogation notable for no events  -Not listed for OHTx        Procedure: Device Interrogation Including analysis of device parameters  Current Settings: Ventricular Assist Device  Review of device function is stable/unstable stable    TXP LVAD INTERROGATIONS 12/16/2022 12/16/2022 12/15/2022 12/15/2022 12/15/2022 10/31/2022 10/31/2022   Type HeartMate3 HeartMate3 HeartMate3 HeartMate3 - HeartMate3 HeartMate3   Flow 3.6 3.7 3.9 3.6 - 3.8 3.7   Speed 5100 5100 5100 5100 - 5100 5150   PI 7.7 7.1 6.2 7.7 - 7.2 6.6   Power (Serna) 3.8 3.8 3.9 3.9 - 3.8 3.7   [facility] 4700 4700 4700 - - - -   Low Flow Alarm - - - - - - -    High Power Alarm - - - - - - -   Pulsatility Intermittent pulse Intermittent pulse - - No Pulse Pulse -           Denise Espinoza PA-C  Heart Transplant  [facility] - Cardiology Stepdown

## 2022-12-16 NOTE — PROGRESS NOTES
"DISCHARGE NOTE:    Kevan Queen is a 37 y.o. male s/p HM3 LVAD admitted for hyperglycemia.     Hospital Course: Endocrine initiated an insulin infusion while inpatient and increased his basal/prandial insulin regimen at discharge.     Pharmacy Interventions/Recommendations:  1) INR Goal: 2-3     2) Antiplatelet Agents: GASTON    3) Heparin Bridging:  yes     4) INR Follow-Up/Discharge Needs:  repeat INR next week.     See list of discharge medication for dosing instructions.     Kevan Queen and his caregiver verbalized their understanding and had the opportunity to ask questions.      Discharge Medications:     Medication List        CHANGE how you take these medications      gabapentin 100 MG capsule  Commonly known as: NEURONTIN  Take 1 capsule (100 mg total) by mouth 3 (three) times daily.  What changed: Another medication with the same name was removed. Continue taking this medication, and follow the directions you see here.     * insulin aspart U-100 100 unit/mL (3 mL) Inpn pen  Commonly known as: NovoLOG  Inject 14 Units into the skin 3 (three) times daily.  What changed: Another medication with the same name was added. Make sure you understand how and when to take each.     * insulin aspart U-100 100 unit/mL (3 mL) Inpn pen  Commonly known as: NovoLOG  Inject 16 Units into the skin 3 (three) times daily with meals. Plus Sliding Scale: 151-200: +1, 201-250: +2, 251-300: +3, 301-350: +4 and call MD; Max Daily Dose: 60 units  What changed: You were already taking a medication with the same name, and this prescription was added. Make sure you understand how and when to take each.     insulin detemir U-100 100 unit/mL (3 mL) Inpn pen  Commonly known as: Levemir FLEXTOUCH  Inject 22 Units into the skin 2 (two) times daily.  What changed:   how much to take  when to take this     * pen needle, diabetic 31 gauge x 1/4" Ndle  1 Device by Misc.(Non-Drug; Combo Route) route 4 (four) times daily.  What changed: Another " "medication with the same name was added. Make sure you understand how and when to take each.     * pen needle, diabetic 32 gauge x 1/4" Ndle  1 each by Misc.(Non-Drug; Combo Route) route 4 (four) times daily.  What changed: You were already taking a medication with the same name, and this prescription was added. Make sure you understand how and when to take each.     warfarin 3 MG tablet  Commonly known as: COUMADIN  Take 1.5 tablets (4.5mg) orally daily, except 1 tablet (3mg) Wednesdays and Fridays  What changed: additional instructions           * This list has 4 medication(s) that are the same as other medications prescribed for you. Read the directions carefully, and ask your doctor or other care provider to review them with you.                CONTINUE taking these medications      blood sugar diagnostic Strp  Use to test blood glucose 4 (four) times daily.     busPIRone 7.5 MG tablet  Commonly known as: BUSPAR  Take 1 tablet (7.5 mg total) by mouth once daily at 6am.     famotidine 40 MG tablet  Commonly known as: PEPCID  Take 1 tablet (40 mg total) by mouth once daily.     ferrous gluconate 324 mg (37.5 mg iron) Tab tablet  Take 1 tablet (324 mg total) by mouth daily with breakfast.     furosemide 80 MG tablet  Commonly known as: LASIX  Take 1 tablet (80 mg total) by mouth once daily.     INV ASPIRIN 100MG/PLACEBO  Take 100 mg by mouth once daily. FOR INVESTIGATIONAL USE ONLY     levETIRAcetam 1000 MG tablet  Commonly known as: KEPPRA  Take 1 tablet (1,000 mg total) by mouth 2 (two) times daily.     milrinone 20mg/100ml D5W (200mcg/ml) 20 mg/100 mL (200 mcg/mL) infusion  Commonly known as: PRIMACOR  Inject 34.875 mcg/min into the vein continuous.     mirtazapine 15 MG tablet  Commonly known as: REMERON  Take 1 tablet (15 mg total) by mouth nightly.     omega-3 acid ethyl esters 1 gram capsule  Commonly known as: LOVAZA  Take 2 capsules (2 g total) by mouth 2 (two) times daily.     potassium chloride SA 20 MEQ " "tablet  Commonly known as: K-DUR,KLOR-CON  Take 2 tablets (40 mEq total) by mouth 3 (three) times daily.     spironolactone 25 MG tablet  Commonly known as: ALDACTONE  Take 1 tablet (25 mg total) by mouth once daily.     TRUE METRIX GLUCOSE METER Misc  Generic drug: blood-glucose meter  Use as instructed     TRUEPLUS LANCETS 33 gauge Misc  Generic drug: lancets  Use to test blood glucose 4 (four) times daily.            STOP taking these medications      acetaminophen 500 MG tablet  Commonly known as: TYLENOL     albuterol 90 mcg/actuation inhaler  Commonly known as: PROVENTIL/VENTOLIN HFA     methocarbamoL 500 MG Tab  Commonly known as: ROBAXIN     milrinone 1 mg/mL injection  Commonly known as: PRIMACOR     senna-docusate 8.6-50 mg 8.6-50 mg per tablet  Commonly known as: SENNA-S               Where to Get Your Medications        These medications were sent to [facility] [address] [address]      Hours: Mon-Fri 7a-7p, Sat-Sun 10a-4p Phone: [phone]   blood sugar diagnostic Strp  insulin aspart U-100 100 unit/mL (3 mL) Inpn pen  insulin detemir U-100 100 unit/mL (3 mL) Inpn pen  pen needle, diabetic 32 gauge x 1/4" Ndle  warfarin 3 MG tablet        "

## 2022-12-16 NOTE — ASSESSMENT & PLAN NOTE
-HeartMate 3 Implanted 6/23/2022 with post operative RVF on IV milrinone 0.25   -Continue Coumadin, Goal INR 2.0-3.0. Therapeutic today.   -Antiplatelets Not on ASA  -LDH is stable overall today. Will continue to monitor daily.  -Speed set at 5100, LSL 4700 rpm  -Interrogation notable for no events  -Not listed for OHTx        Procedure: Device Interrogation Including analysis of device parameters  Current Settings: Ventricular Assist Device  Review of device function is stable/unstable stable    TXP LVAD INTERROGATIONS 12/16/2022 12/16/2022 12/15/2022 12/15/2022 12/15/2022 10/31/2022 10/31/2022   Type HeartMate3 HeartMate3 HeartMate3 HeartMate3 - HeartMate3 HeartMate3   Flow 3.6 3.7 3.9 3.6 - 3.8 3.7   Speed 5100 5100 5100 5100 - 5100 5150   PI 7.7 7.1 6.2 7.7 - 7.2 6.6   Power (Serna) 3.8 3.8 3.9 3.9 - 3.8 3.7   LSL 4700 4700 4700 - - - -   Low Flow Alarm - - - - - - -   High Power Alarm - - - - - - -   Pulsatility Intermittent pulse Intermittent pulse - - No Pulse Pulse -

## 2022-12-16 NOTE — PLAN OF CARE
Pt free of falls and injury. Pt AAOx4. Fall precautions remain in place. Pt on room air. Sats >95%. NSR-ST on telemetry with LVAD artifact. LVAD hum present and smooth. VAD numbers and dopplers WDL. Plan of care reviewed with pt. Insulin gtt infusing and titrated per protocol. BG monitored. Pt asymptomatic throughout shift. Milrinone gtt infusing. Pt resting comfortably with no complaints of pain. Pt denies chest pain, headache, and SOB. No acute distress noted,  plan of care continues.

## 2022-12-16 NOTE — HPI
Kevan Queen is a 37 y.o.  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 He underwent RVAD removal and chest closure 6/30/2022.  He also completed a course of IV Abx for staph epi. He was weaned off  but he had to restarted due to RVF. He was eventually transitioned to milrinone (secondary to  shortage) now on 0.25 mcg/kg/min.  Patient presents to the ER at the recommendation of his transplant coordinator due to elevated glucose.  He has been compliant with his medications as well as diet.  He denies any symptoms or heart failure such as orthopnea, PND, lower extremity swelling, dyspnea at rest or on exertion.  He also denies any chest discomfort such as pain, pressure, or tightness as well as any headaches, lightheaded, dizziness, nausea, vomiting, or diarrhea.  He has been taking his Lasix 80 mg daily as instructed and monitoring his oral intake.  In the ER his glucose on CMP noted to be 600, however this was drawn from his PICC line in which D5 is running with his Milrinone.  Followup fingerstick was noted to be in the 500s.  Repeat CMP is now pending from a different site.  Patient feels well currently.  Endocrinology consulted for hyperglycemia management.  Patient does have a history of HHS as well as seizures of unclear etiology however there was a concern that it was possibly due to hypoglycemia versus alcohol.

## 2022-12-16 NOTE — NURSING
Pt arrived to CSU room 312. Pt AAOx4. Respirations even and unlabored. Equal chest rise and fall noted. No distress or c/o pain. Pt oriented to room. Milrinone gtt infusing. Call light within reach. Side rails up x2. Bed wheels locked and in lowest position. Will continue to monitor.

## 2022-12-16 NOTE — PROCEDURES
Kevan Queen is a 37 y.o.  male patient, who presents for a 6 minute walk test ordered by MD Grecia.  The diagnosis is  (LVAD); Cardiomyopathy.  The patient's BMI is 31.6 kg/m2.  Predicted distance (lower limit of normal) is 552.94 meters.      Test Results:    The test was completed without stopping.  The total time walked was 360 seconds.  During walking, the patient reported:  Left leg heaviness.  The patient used no assistive devices during testing.     12/15/2022---------Distance: 381 meters (1250 feet)     O2 Sat % Supplemental Oxygen Heart Rate Blood Pressure Ria Scale   Pre-exercise  (Resting) 97 % Room Air 69 bpm Unable to obtain 0   During Exercise 98 % Room Air 71 bpm Unable to obtain 0   Post-exercise  (Recovery) 98 % Room Air  70 bpm       Recovery Time:  70 seconds  The patient walked the first 15 feet in 3.70 seconds.    Performing nurse/tech: Estopinal RRT      PREVIOUS STUDY:   The patient has not had a previous study.      CLINICAL INTERPRETATION:  Six minute walk distance is 381 meters (1250 feet) with no dyspnea.  During exercise, there was no desaturation while breathing room air.  Heart rate remained stable with walking.  The patient reported non-pulmonary symptoms during exercise.  No previous study performed.  Based upon age and body mass index, exercise capacity is less than predicted.

## 2022-12-16 NOTE — HPI
Reason for Consult: Management of T2DM, Hyperglycemia     Surgical Procedure and Date: LVAD 06/29/2022    Diabetes diagnosis year: 2022    Home Diabetes Medications:   -Levemir 36 units HS.   -Novolog 14 units TIDWM in addition to the following correction scale:     150 - 200 + 1 unit     201 - 250 + 2 units     251 - 300 + 3 units     301 - 350 + 4 units      > 350   + 5 units    How often checking glucose at home?  Uses Freestyle William    BG readings on regimen: 200 or higher and occasionally had just reading of HIGH   Hypoglycemia on the regimen?  No  Missed doses on regimen?  Yes, occasionally skips breakfast do to sleeping in and will skip Novolog with breakfast     Diabetes Complications include:     Hyperglycemia    Complicating diabetes co morbidities:   History of MI, CHF, and CKD      HPI:   Patient is a 37 y.o. male with a diagnosis of NICM status post DT-HM3 implantation 6/23/2022 with early RV failure requiring RVAD with ProTek Duo after admitted with ADHF/cardiogenic shock on home milrinone requiring IABP. He underwent RVAD removal and chest closure 6/30/2022.  He also completed a course of IV Abx for staph epi.  He was eventually transitioned to milrinone (secondary to  shortage) now on 0.25 mcg/kg/min.      Patient presents to the ER at the recommendation of his transplant coordinator due to elevated glucose.  He has been compliant with his medications as well as diet.  In the ER his glucose on CMP noted to be 600, however this was drawn from his PICC line in which D5 is running with his Milrinone.  Followup fingerstick was noted to be in the 500s.  Repeat CMP is now pending from a different site. No DKA or HHS noted. Endocrinology consulted for T2DM/hyperglycemia management.      Lab Results   Component Value Date    HGBA1C 7.4 (H) 09/01/2022

## 2022-12-16 NOTE — NURSING
Patient is ready for discharge. Patient stable alert and oriented. All LVAD equipment returned and inventoried, PICC line in place infusing home miliorone, patient connected by infusion representative. No complaints of pain. Discussed discharge plan. Reviewed medications and side effects, appointments, and answered questions with patient and family. RX sent to [facility] for pickup.

## 2022-12-16 NOTE — NURSING
Pt receiving Milrinone gtt through home infusion pump. Pt prefers to stay on his home infusion pump for the night. Dr. Wilkinson notified and is okay with pt being on home milrinone.

## 2022-12-16 NOTE — HOSPITAL COURSE
Admitted with hyperglycemia (glucose in 500s), improved to 268 (pt's baseline). Placed on insulin gtt. Endocrinology consulted,started on Novolag 14 U TID and Levemir 36 U daily. HTS follow up in 2-3 weeks.

## 2022-12-16 NOTE — CARE UPDATE
Paged regarding care for patient, ER planning to discharge tomorrow after stay in Obs.  Hyperglycemic in 500s, no DKA or HHS.  Started intensive insulin drip and gave an additional 20 mEq of potassium with fingerstick checks every hour for insulin drip titration. Clear liquid diet.  NP to see in the morning.

## 2022-12-16 NOTE — DISCHARGE SUMMARY
[facility] - Cardiology Stepdown  Heart Transplant  Discharge Summary      Patient Name: Kevan Queen  MRN: [id number]  Admission Date: 12/15/2022  Hospital Length of Stay: 0 days  Discharge Date and Time: 12/16/2022 2:30 PM  Attending Physician: Natalya Villatoro MD   Discharging Provider: Denise Espinoza PA-C  Primary Care Provider: ORALIA Cline     HPI: Kevan Queen is a 37 y.o.  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 He underwent RVAD removal and chest closure 6/30/2022.  He also completed a course of IV Abx for staph epi. He was weaned off  but he had to restarted due to RVF. He was eventually transitioned to milrinone (secondary to  shortage) now on 0.25 mcg/kg/min.  Patient presents to the ER at the recommendation of his transplant coordinator due to elevated glucose.  He has been compliant with his medications as well as diet.  He denies any symptoms or heart failure such as orthopnea, PND, lower extremity swelling, dyspnea at rest or on exertion.  He also denies any chest discomfort such as pain, pressure, or tightness as well as any headaches, lightheaded, dizziness, nausea, vomiting, or diarrhea.  He has been taking his Lasix 80 mg daily as instructed and monitoring his oral intake.  In the ER his glucose on CMP noted to be 600, however this was drawn from his PICC line in which D5 is running with his Milrinone.  Followup fingerstick was noted to be in the 500s.  Repeat CMP is now pending from a different site.  Patient feels well currently.  Endocrinology consulted for hyperglycemia management.  Patient does have a history of HHS as well as seizures of unclear etiology however there was a concern that it was possibly due to hypoglycemia versus alcohol.         * No surgery found *     Hospital  Course: Admitted with hyperglycemia (glucose in 500s), improved to 268 (pt's baseline). Placed on insulin gtt. Endocrinology consulted,started on Novolag 14 U TID and Levemir 36 U daily. HTS follow up in 2-3 weeks.       Goals of Care Treatment Preferences:  Code Status: Full Code    Health care agent: Malou Dumont  [facility] agent number: [phone]                   Consults (From admission, onward)        Status Ordering Provider     Inpatient consult to Endocrinology  Once        Provider:  (Not yet assigned)    Completed REZA EDWARDS     Inpatient consult to Cardiology  Once        Provider:  (Not yet assigned)    Completed DANIEL ANDUJAR          Significant Diagnostic Studies: Labs:   BMP:   Recent Labs   Lab 12/15/22  1402 12/15/22  1806 12/16/22  0636   * 522* 268*   * 128* 133*   K 3.7 4.0 4.0   CL 89* 91* 98   CO2 27 26 26   BUN 18 18 17   CREATININE 1.8* 1.8* 1.5*   CALCIUM 10.2 9.8 9.8   MG 2.0  --  2.2       Pending Diagnostic Studies:     None        Final Active Diagnoses:    Diagnosis Date Noted POA    PRINCIPAL PROBLEM:  Type 2 diabetes mellitus with hyperglycemia [E11.65] 05/25/2022 Yes    Hyperglycemia [R73.9] 12/16/2022 Yes    LVAD (left ventricular assist device) present [Z95.811] 06/30/2022 Not Applicable      Problems Resolved During this Admission:      Discharged Condition: stable    Disposition: Home or Self Care    Follow Up:    Patient Instructions:      Notify your health care provider if you experience any of the following:  increased confusion or weakness     Notify your health care provider if you experience any of the following:  persistent dizziness, light-headedness, or visual disturbances     Notify your health care provider if you experience any of the following:  worsening rash     Notify your health care provider if you experience any of the following:  severe persistent headache     Notify your health care provider if you experience any of the  "following:  difficulty breathing or increased cough     Notify your health care provider if you experience any of the following:  redness, tenderness, or signs of infection (pain, swelling, redness, odor or green/yellow discharge around incision site)     Notify your health care provider if you experience any of the following:  severe uncontrolled pain     Notify your health care provider if you experience any of the following:  persistent nausea and vomiting or diarrhea     Notify your health care provider if you experience any of the following:  temperature >100.4     Activity as tolerated     Medications:  Reconciled Home Medications:      Medication List      CHANGE how you take these medications    gabapentin 100 MG capsule  Commonly known as: NEURONTIN  Take 1 capsule (100 mg total) by mouth 3 (three) times daily.  What changed: Another medication with the same name was removed. Continue taking this medication, and follow the directions you see here.     * insulin aspart U-100 100 unit/mL (3 mL) Inpn pen  Commonly known as: NovoLOG  Inject 14 Units into the skin 3 (three) times daily.  What changed: Another medication with the same name was added. Make sure you understand how and when to take each.     * insulin aspart U-100 100 unit/mL (3 mL) Inpn pen  Commonly known as: NovoLOG  Inject 16 Units into the skin 3 (three) times daily with meals. Plus Sliding Scale: 151-200: +1, 201-250: +2, 251-300: +3, 301-350: +4 and call MD; Max Daily Dose: 60 units  What changed: You were already taking a medication with the same name, and this prescription was added. Make sure you understand how and when to take each.     insulin detemir U-100 100 unit/mL (3 mL) Inpn pen  Commonly known as: Levemir FLEXTOUCH  Inject 22 Units into the skin 2 (two) times daily.  What changed:   · how much to take  · when to take this     * pen needle, diabetic 31 gauge x 1/4" Ndle  1 Device by Misc.(Non-Drug; Combo Route) route 4 (four) times " "daily.  What changed: Another medication with the same name was added. Make sure you understand how and when to take each.     * pen needle, diabetic 32 gauge x 1/4" Ndle  1 each by Misc.(Non-Drug; Combo Route) route 4 (four) times daily.  What changed: You were already taking a medication with the same name, and this prescription was added. Make sure you understand how and when to take each.     warfarin 3 MG tablet  Commonly known as: COUMADIN  Take 1.5 tablets (4.5mg) orally daily, except 1 tablet (3mg) Wednesdays and Fridays  What changed: additional instructions         * This list has 4 medication(s) that are the same as other medications prescribed for you. Read the directions carefully, and ask your doctor or other care provider to review them with you.            CONTINUE taking these medications    blood sugar diagnostic Strp  Use to test blood glucose 4 (four) times daily.     busPIRone 7.5 MG tablet  Commonly known as: BUSPAR  Take 1 tablet (7.5 mg total) by mouth once daily at 6am.     famotidine 40 MG tablet  Commonly known as: PEPCID  Take 1 tablet (40 mg total) by mouth once daily.     ferrous gluconate 324 mg (37.5 mg iron) Tab tablet  Take 1 tablet (324 mg total) by mouth daily with breakfast.     furosemide 80 MG tablet  Commonly known as: LASIX  Take 1 tablet (80 mg total) by mouth once daily.     INV ASPIRIN 100MG/PLACEBO  Take 100 mg by mouth once daily. FOR INVESTIGATIONAL USE ONLY     levETIRAcetam 1000 MG tablet  Commonly known as: KEPPRA  Take 1 tablet (1,000 mg total) by mouth 2 (two) times daily.     milrinone 20mg/100ml D5W (200mcg/ml) 20 mg/100 mL (200 mcg/mL) infusion  Commonly known as: PRIMACOR  Inject 34.875 mcg/min into the vein continuous.     mirtazapine 15 MG tablet  Commonly known as: REMERON  Take 1 tablet (15 mg total) by mouth nightly.     omega-3 acid ethyl esters 1 gram capsule  Commonly known as: LOVAZA  Take 2 capsules (2 g total) by mouth 2 (two) times daily.   "   potassium chloride SA 20 MEQ tablet  Commonly known as: K-DUR,KLOR-CON  Take 2 tablets (40 mEq total) by mouth 3 (three) times daily.     spironolactone 25 MG tablet  Commonly known as: ALDACTONE  Take 1 tablet (25 mg total) by mouth once daily.     TRUE METRIX GLUCOSE METER Misc  Generic drug: blood-glucose meter  Use as instructed     TRUEPLUS LANCETS 33 gauge Misc  Generic drug: lancets  Use to test blood glucose 4 (four) times daily.        STOP taking these medications    acetaminophen 500 MG tablet  Commonly known as: TYLENOL     albuterol 90 mcg/actuation inhaler  Commonly known as: PROVENTIL/VENTOLIN HFA     methocarbamoL 500 MG Tab  Commonly known as: ROBAXIN     milrinone 1 mg/mL injection  Commonly known as: PRIMACOR     senna-docusate 8.6-50 mg 8.6-50 mg per tablet  Commonly known as: SENNA-S            Denise Espinoza PA-C  Heart Transplant  [facility] - Cardiology Stepdown

## 2022-12-16 NOTE — ED TRIAGE NOTES
Pt present to ED via personal transport after referral from LVAD clinic for glucose greater than 600. Pt denies any complaints at this time. LVAD coordinator aware.

## 2022-12-16 NOTE — SUBJECTIVE & OBJECTIVE
Interval HPI:   Overnight events:  Remains in CSU. BG has trended down since starting IV intensive insulin protocol but remains above goal ranges this morning. IV insulin infusion rates ranging from 0.6-2 u/hr. Diet diabetic [facility]; 2000 Calorie; Fluid - 1500mL    Eating:    diet progressed this morning   Nausea: No  Hypoglycemia and intervention: No  Fever: No  TPN and/or TF: No  If yes, type of TF/TPN and rate: n/a    PMH, PSH, FH, SH reviewed     ROS:  Constitutional: Negative for weight changes.  Eyes: Negative for visual disturbance.  Respiratory: Negative for cough.   Cardiovascular: Negative for chest pain.  Gastrointestinal: Negative for nausea.  Endocrine: Negative for polyuria, polydipsia.  Musculoskeletal: Negative for back pain.  Skin: Negative for rash.  Neurological: Negative for syncope.  Psychiatric/Behavioral: Negative for depression.      Review of Systems    Current Medications and/or Treatments Impacting Glycemic Control  Immunotherapy:    Immunosuppressants       None          Steroids:   Hormones (From admission, onward)      None          Pressors:    Autonomic Drugs (From admission, onward)      None          Hyperglycemia/Diabetes Medications:   Antihyperglycemics (From admission, onward)      Start     Stop Route Frequency Ordered    12/16/22 1130  insulin aspart U-100 pen 12 Units         -- SubQ 3 times daily with meals 12/16/22 0953    12/16/22 1052  insulin aspart U-100 pen 0-5 Units         -- SubQ Before meals & nightly PRN 12/16/22 0953    12/16/22 1000  insulin detemir U-100 pen 36 Units         -- SubQ Daily 12/16/22 0953             PHYSICAL EXAMINATION:  Vitals:    12/16/22 0735   BP: (!) 86/0   Pulse:    Resp:    Temp:      Body mass index is 24.15 kg/m².    Physical Exam  Constitutional: Well developed, well nourished, NAD.  ENT: External ears no masses with nose patent; normal hearing.  Neck: Supple; trachea midline.  Cardiovascular: Normal heart sounds  Lungs:  Normal effort; lungs anterior bilaterally clear to auscultation.  Abdomen: Soft, no masses, no hernias.  MS: No clubbing or cyanosis of nails noted; unable to assess gait.  Skin: No rashes, lesions, or ulcers; no nodules. Injection sites are ok. No lipo hypertropthy or atrophy.  Psychiatric: Good judgement and insight; normal mood and affect.  Neurological: Cranial nerves are grossly intact.   Foot: Nails in good condition, no amputations noted.

## 2022-12-16 NOTE — ASSESSMENT & PLAN NOTE
BG goal 140-180    Notified by primary team that patient will likely be discharging today.    Plan:  -Discontinue IV intensive insulin protocol  -Start Levemir 36 units daily (home dosing)  -Start Novolog 14 units TID with meals (home dosing)  -Low Dose Correction Scale  -BG monitoring ac/hs      Discharge plans:  -Patient reporting high BG readings at home (into the 500s). Recommend the following adjustements to home regimen with close follow up:    -Change Levemir to 22 units BID   -Increase Novolog to 16 units TID with meals in addition to the following correction scale   150 - 200 + 1 unit  201 - 250 + 2 units  251 - 300 + 3 units  301 - 350 + 4 units   > 350   + 5 units    - Insurance preferred diabetes testing supplies. Recommend NormOxyse 2 CGM. Patient has diabetes mellitus and manages diabetes with intensive insulin regimen with three or more insulin injections daily or CSII.  -Patient requires a therapeutic CGM and is willing to use therapeutic CGM for the necessary frequent adjustments of insulin therapy. Patient has been using SMBG for frequent glucose monitoring (4x/day). I have completed an in-person visit during the previous 6 months and will continue to have in-person visits every 6 months to assess adherence to their CGM regimen and diabetes treatment plan.    - Patient is to keep blood sugar logs, monitor BG at least 4 times daily, and follow-up with PCP for continued DM management and care. Patient reports next scheduled apt with PCP is 12/19. Instructed to notify [place] endocrine clinic or PCP for any BG < 80 or consistently >200. Reviewed when to hold insulin, how the correction scale works, insulin dosing adjustments, s/s and treatment of hypoglycemia, and importance of follow up. Patient verabalized understanding.

## 2022-12-16 NOTE — PROGRESS NOTES
12/16/2022  John Alaniz    Current provider:  Natalya Villatoro MD    Device interrogation:  TXP LVAD INTERROGATIONS 12/16/2022 12/16/2022 12/15/2022 12/15/2022 12/15/2022 10/31/2022 10/31/2022   Type HeartMate3 HeartMate3 HeartMate3 HeartMate3 - HeartMate3 HeartMate3   Flow 3.6 3.7 3.9 3.6 - 3.8 3.7   Speed 5100 5100 5100 5100 - 5100 5150   PI 7.7 7.1 6.2 7.7 - 7.2 6.6   Power (Serna) 3.8 3.8 3.9 3.9 - 3.8 3.7   LSL 4700 4700 4700 - - - -   Low Flow Alarm - - - - - - -   High Power Alarm - - - - - - -   Pulsatility Intermittent pulse Intermittent pulse - - No Pulse Pulse -          Rounded on Kevan Queen to ensure all mechanical assist device settings (IABP or VAD) were appropriate and all parameters were within limits.  I was able to ensure all back up equipment was present, the staff had no issues, and the Perfusion Department daily rounding was complete.      For implantable VADs: Interrogation of Ventricular assist device was performed with analysis of device parameters and review of device function. I have personally reviewed the interrogation findings and agree with findings as stated.     In emergency, the nursing units have been notified to contact the perfusion department either by:  Calling [phone] from 630am to 4pm Mon thru Fri, utilizing the On-Call Finder functionality of Epic and searching for Perfusion, or by contacting the hospital  from 4pm to 630am and on weekends and asking to speak with the perfusionist on call.    12:15 PM

## 2022-12-16 NOTE — SUBJECTIVE & OBJECTIVE
Interval History: Admitted overnight with hyperglycemia (glucose in 500s), now improved 268 (pt's baseline). On insulin gtt. Would appreciate Endocrinology's recommendations for discharge medications.     Continuous Infusions:   milrinone 20mg/100ml D5W (200mcg/ml) 0.25 mcg/kg/min (12/16/22 0914)     Scheduled Meds:   ferrous gluconate  324 mg Oral Daily with breakfast    gabapentin  100 mg Oral TID    insulin aspart U-100  12 Units Subcutaneous TIDWM    insulin detemir U-100  36 Units Subcutaneous Daily    levETIRAcetam  1,000 mg Oral BID    mirtazapine  15 mg Oral Nightly    warfarin  3 mg Oral Once per day on Wed Fri    [START ON 12/17/2022] warfarin  4.5 mg Oral Once per day on Sun Mon Tue Thu Sat     PRN Meds:acetaminophen, glucagon (human recombinant), glucose, glucose, hydrALAZINE, insulin aspart U-100, methocarbamoL    Review of patient's allergies indicates:   Allergen Reactions    Aspirin Other (See Comments)     Mr. Thacker is enrolled in Dr. Paige's Alon Trial and cannot have any aspirin and/or aspirin-containing products. DO NOT cancel any orders for INV Aspirin 100 mg/Placebo. If you have questions, please contact Isabel @ [phone], [phone],[email or web address], secure chat or MS Teams message.    Bumex [bumetanide] Hives    Lactose Diarrhea     Other reaction(s): Abdominal distension, gaseous    Torsemide Hives     Objective:     Vital Signs (Most Recent):  Temp: 96.9 °F (36.1 °C) (12/16/22 0723)  Pulse: 109 (12/16/22 0723)  Resp: 18 (12/16/22 0723)  BP: 111/78 (12/16/22 0500)  SpO2: 99 % (12/16/22 0723) Vital Signs (24h Range):  Temp:  [96.9 °F (36.1 °C)-98.1 °F (36.7 °C)] 96.9 °F (36.1 °C)  Pulse:  [] 109  Resp:  [18-20] 18  SpO2:  [98 %-100 %] 99 %  BP: ()/(0-82) 111/78     Patient Vitals for the past 72 hrs (Last 3 readings):   Weight   12/16/22 0814 94.8 kg (208 lb 15.9 oz)   12/15/22 2145 100.2 kg (220 lb 14.4 oz)   12/15/22 1644 96.6 kg (213 lb)     Body mass index is 24.15  kg/m².      Intake/Output Summary (Last 24 hours) at 12/16/2022 1037  Last data filed at 12/16/2022 0104  Gross per 24 hour   Intake --   Output 350 ml   Net -350 ml       Hemodynamic Parameters:           Physical Exam  Vitals and nursing note reviewed.   HENT:      Head: Normocephalic.      Nose: Nose normal.   Eyes:      Conjunctiva/sclera: Conjunctivae normal.   Cardiovascular:      Rate and Rhythm: Normal rate and regular rhythm.   Pulmonary:      Effort: Pulmonary effort is normal.      Breath sounds: Normal breath sounds.   Abdominal:      General: Abdomen is flat.   Musculoskeletal:         General: Normal range of motion.      Cervical back: Normal range of motion.      Right lower leg: No edema.      Left lower leg: No edema.   Skin:     General: Skin is warm.      Capillary Refill: Capillary refill takes less than 2 seconds.   Neurological:      General: No focal deficit present.      Mental Status: He is alert.   Psychiatric:         Mood and Affect: Mood normal.         Behavior: Behavior normal.         Thought Content: Thought content normal.         Judgment: Judgment normal.       Significant Labs:  CBC:  Recent Labs   Lab 12/15/22  1402 12/15/22  1806 12/15/22  1818 12/16/22  0636   WBC 9.92 10.24  --  9.50   RBC 5.26 4.81  --  5.04   HGB 15.2 14.3  --  14.7   HCT 45.6 41.6 47 43.6    276  --  255   MCV 87 87  --  87   MCH 28.9 29.7  --  29.2   MCHC 33.3 34.4  --  33.7     BNP:  Recent Labs   Lab 12/15/22  1402 12/16/22  0636   BNP 91 37     CMP:  Recent Labs   Lab 12/15/22  1402 12/15/22  1806 12/16/22  0636   * 522* 268*   CALCIUM 10.2 9.8 9.8   ALBUMIN 4.3 4.1 3.8   PROT 9.1* 8.6* 8.2   * 128* 133*   K 3.7 4.0 4.0   CO2 27 26 26   CL 89* 91* 98   BUN 18 18 17   CREATININE 1.8* 1.8* 1.5*   ALKPHOS 130 114 107   ALT 32 28 24   AST 31 27 25   BILITOT 1.0 1.1* 1.0      Coagulation:   Recent Labs   Lab 12/12/22  0000 12/15/22  1402 12/16/22  0636   INR 2.2 2.6* 2.2*   APTT  --    --  35.8*     LDH:  Recent Labs   Lab 12/15/22  1402 12/16/22  0636    224     Microbiology:  Microbiology Results (last 7 days)       ** No results found for the last 168 hours. **            I have reviewed all pertinent labs within the past 24 hours.    Estimated Creatinine Clearance: 87.2 mL/min (A) (based on SCr of 1.5 mg/dL (H)).    Diagnostic Results:  I have reviewed all pertinent imaging results/findings within the past 24 hours.

## 2022-12-19 LAB — INR PPP: 2.1

## 2022-12-19 NOTE — PROGRESS NOTES
Kevan Queen is a 37 y.o. male s/p HM3 LVAD admitted for hyperglycemia. During admission, endocrine initiated an insulin infusion and switched him to a higher dose basal/prandial insulin regimen at discharge. No changes were made to warfarin regimen. Patient was discharged on 12/16, and is already scheduled for lab today on 12/19.

## 2022-12-20 ENCOUNTER — ANTI-COAG VISIT (OUTPATIENT)
Dept: CARDIOLOGY | Facility: CLINIC | Age: 37
End: 2022-12-20
Payer: MEDICAID

## 2022-12-20 ENCOUNTER — DOCUMENTATION ONLY (OUTPATIENT)
Dept: TRANSPLANT | Facility: CLINIC | Age: 37
End: 2022-12-20
Payer: MEDICAID

## 2022-12-20 DIAGNOSIS — Z95.811 LVAD (LEFT VENTRICULAR ASSIST DEVICE) PRESENT: Primary | ICD-10-CM

## 2022-12-20 LAB
EXT ALBUMIN: 4.3
EXT ALT: 26
EXT AST: 34
EXT BILIRUBIN TOTAL: 0.3
EXT BNP: 320
EXT BUN: 9
EXT CALCIUM: 9.6
EXT CHLORIDE: 106
EXT CREATININE: 1.16 MG/DL
EXT GLUCOSE: 203
EXT HEMATOCRIT: 38.7
EXT HEMOGLOBIN: 12.6
EXT MAGNESIUM: 2.2
EXT NT PROBNP: 933
EXT PLATELETS: 266
EXT POTASSIUM: 4.6
EXT PROTEIN TOTAL: 7.5
EXT SODIUM: 140 MMOL/L
EXT WBC: 8.2

## 2022-12-20 PROCEDURE — 93793 ANTICOAG MGMT PT WARFARIN: CPT | Mod: ,,,

## 2022-12-20 PROCEDURE — 93793 PR ANTICOAGULANT MGMT FOR PT TAKING WARFARIN: ICD-10-PCS | Mod: ,,,

## 2022-12-20 NOTE — PROGRESS NOTES
Darlyn Wright advised of dose instructions and verbalized understanding.  Patient rescheduled appointment from 12/22 to 12/27

## 2022-12-28 ENCOUNTER — TELEPHONE (OUTPATIENT)
Dept: RESEARCH | Facility: HOSPITAL | Age: 37
End: 2022-12-28
Payer: MEDICAID

## 2022-12-28 NOTE — TELEPHONE ENCOUNTER
Study Title:  Antiplatelet Removal and HemocompatIbility EventS with the HeartMate 3 Pump   Protocol: [id number]  IRB #/Approval Date: [id number]  Sponsor: [employer]  : Dr. Luis F Paige     Spoke w Mrs. Queen regarding MELARA sent via [employer] . Package was received and Mr. Queen began the MELARA on December 23, 2022 and has stopped taking any more MELARA from old bottle. She was reminded to bring old bottle to next appointment and give to me and voiced understanding.

## 2022-12-29 ENCOUNTER — TELEPHONE (OUTPATIENT)
Dept: TRANSPLANT | Facility: CLINIC | Age: 37
End: 2022-12-29
Payer: MEDICAID

## 2022-12-29 NOTE — TELEPHONE ENCOUNTER
Attempted to set pt up for labs.  Called pt, no answer.  Called SO, labs set up for tomorrow at 0800.

## 2022-12-30 ENCOUNTER — ANTI-COAG VISIT (OUTPATIENT)
Dept: CARDIOLOGY | Facility: CLINIC | Age: 37
End: 2022-12-30
Payer: MEDICAID

## 2022-12-30 ENCOUNTER — LAB VISIT (OUTPATIENT)
Dept: LAB | Facility: HOSPITAL | Age: 37
End: 2022-12-30
Attending: INTERNAL MEDICINE
Payer: MEDICAID

## 2022-12-30 ENCOUNTER — TELEPHONE (OUTPATIENT)
Dept: TRANSPLANT | Facility: CLINIC | Age: 37
End: 2022-12-30
Payer: MEDICAID

## 2022-12-30 DIAGNOSIS — Z95.811 LVAD (LEFT VENTRICULAR ASSIST DEVICE) PRESENT: Primary | ICD-10-CM

## 2022-12-30 DIAGNOSIS — Z95.811 LVAD (LEFT VENTRICULAR ASSIST DEVICE) PRESENT: ICD-10-CM

## 2022-12-30 LAB
ALBUMIN SERPL-MCNC: 3.9 G/DL (ref 3.5–5)
ALBUMIN/GLOB SERPL: 1 RATIO (ref 1.1–2)
ALP SERPL-CCNC: 107 UNIT/L (ref 40–150)
ALT SERPL-CCNC: 32 UNIT/L (ref 0–55)
AST SERPL-CCNC: 39 UNIT/L (ref 5–34)
BASOPHILS # BLD AUTO: 0.04 X10(3)/MCL (ref 0–0.2)
BASOPHILS NFR BLD AUTO: 0.4 %
BILIRUBIN DIRECT+TOT PNL SERPL-MCNC: 0.5 MG/DL
BNP BLD-MCNC: 188.1 PG/ML
BUN SERPL-MCNC: 6.9 MG/DL (ref 8.9–20.6)
CALCIUM SERPL-MCNC: 9.6 MG/DL (ref 8.4–10.2)
CHLORIDE SERPL-SCNC: 106 MMOL/L (ref 98–107)
CO2 SERPL-SCNC: 21 MMOL/L (ref 22–29)
CREAT SERPL-MCNC: 1.53 MG/DL (ref 0.73–1.18)
EOSINOPHIL # BLD AUTO: 0.1 X10(3)/MCL (ref 0–0.9)
EOSINOPHIL NFR BLD AUTO: 1 %
ERYTHROCYTE [DISTWIDTH] IN BLOOD BY AUTOMATED COUNT: 15.9 % (ref 11.6–14.4)
GFR SERPLBLD CREATININE-BSD FMLA CKD-EPI: 60 MLS/MIN/1.73/M2
GLOBULIN SER-MCNC: 4 GM/DL (ref 2.4–3.5)
GLUCOSE SERPL-MCNC: 226 MG/DL (ref 74–100)
HCT VFR BLD AUTO: 43.1 % (ref 42–52)
HGB BLD-MCNC: 13.5 GM/DL (ref 14–18)
IMM GRANULOCYTES # BLD AUTO: 0.04 X10(3)/MCL (ref 0–0.04)
IMM GRANULOCYTES NFR BLD AUTO: 0.4 %
INR BLD: 2.16 (ref 0–1.3)
LYMPHOCYTES # BLD AUTO: 2.84 X10(3)/MCL (ref 0.6–4.6)
LYMPHOCYTES NFR BLD AUTO: 28.9 %
MAGNESIUM SERPL-MCNC: 1.9 MG/DL (ref 1.6–2.6)
MCH RBC QN AUTO: 28.8 PG
MCHC RBC AUTO-ENTMCNC: 31.3 MG/DL (ref 33–36)
MCV RBC AUTO: 91.9 FL (ref 80–94)
MONOCYTES # BLD AUTO: 0.8 X10(3)/MCL (ref 0.1–1.3)
MONOCYTES NFR BLD AUTO: 8.1 %
NEUTROPHILS # BLD AUTO: 6.02 X10(3)/MCL (ref 2.1–9.2)
NEUTROPHILS NFR BLD AUTO: 61.2 %
NRBC BLD AUTO-RTO: 0 % (ref 0–1)
PLATELET # BLD AUTO: 329 X10(3)/MCL (ref 140–371)
PMV BLD AUTO: 9.9 FL (ref 9.4–12.4)
POTASSIUM SERPL-SCNC: 5.1 MMOL/L (ref 3.5–5.1)
PROT SERPL-MCNC: 7.9 GM/DL (ref 6.4–8.3)
PROTHROMBIN TIME: 23.7 SECONDS (ref 12.5–14.5)
RBC # BLD AUTO: 4.69 X10(6)/MCL (ref 4.7–6.1)
SODIUM SERPL-SCNC: 137 MMOL/L (ref 136–145)
WBC # SPEC AUTO: 9.8 X10(3)/MCL (ref 4.5–11.5)

## 2022-12-30 PROCEDURE — 93793 ANTICOAG MGMT PT WARFARIN: CPT | Mod: ,,,

## 2022-12-30 PROCEDURE — 80053 COMPREHEN METABOLIC PANEL: CPT

## 2022-12-30 PROCEDURE — 83880 ASSAY OF NATRIURETIC PEPTIDE: CPT

## 2022-12-30 PROCEDURE — 93793 PR ANTICOAGULANT MGMT FOR PT TAKING WARFARIN: ICD-10-PCS | Mod: ,,,

## 2022-12-30 PROCEDURE — 85610 PROTHROMBIN TIME: CPT

## 2022-12-30 PROCEDURE — 36415 COLL VENOUS BLD VENIPUNCTURE: CPT

## 2022-12-30 PROCEDURE — 85025 COMPLETE CBC W/AUTO DIFF WBC: CPT

## 2022-12-30 PROCEDURE — 83735 ASSAY OF MAGNESIUM: CPT

## 2022-12-30 NOTE — TELEPHONE ENCOUNTER
Patient's labs reviewed, K noted to be at 5.1. Patient endorses taking K 40 meq TID. Advised patient to skip a dose tonight and one dose tomorrow with repeat labs next week. Patient verbalized understanding and agreement.

## 2023-01-03 LAB
EXT ALBUMIN: 4.1
EXT ALT: 29
EXT AST: 61
EXT BILIRUBIN TOTAL: 0.8
EXT BNP: 515
EXT BUN: 8
EXT CALCIUM: 9.6
EXT CHLORIDE: 109
EXT CREATININE: 1.24 MG/DL
EXT GLUCOSE: 90
EXT HEMATOCRIT: 35.7
EXT HEMOGLOBIN: 12.1
EXT MAGNESIUM: 1.7
EXT PLATELETS: 291
EXT POTASSIUM: 5.4
EXT PROTEIN TOTAL: 7.5
EXT SODIUM: 143 MMOL/L
EXT WBC: 7.9

## 2023-01-05 ENCOUNTER — PATIENT MESSAGE (OUTPATIENT)
Dept: CARDIOLOGY | Facility: CLINIC | Age: 38
End: 2023-01-05

## 2023-01-05 ENCOUNTER — ANTI-COAG VISIT (OUTPATIENT)
Dept: CARDIOLOGY | Facility: CLINIC | Age: 38
End: 2023-01-05
Payer: MEDICAID

## 2023-01-05 DIAGNOSIS — Z95.811 LVAD (LEFT VENTRICULAR ASSIST DEVICE) PRESENT: Primary | ICD-10-CM

## 2023-01-05 LAB — INR PPP: 1.8

## 2023-01-05 PROCEDURE — 93793 PR ANTICOAGULANT MGMT FOR PT TAKING WARFARIN: ICD-10-PCS | Mod: ,,,

## 2023-01-05 PROCEDURE — 93793 ANTICOAG MGMT PT WARFARIN: CPT | Mod: ,,,

## 2023-01-05 NOTE — PROGRESS NOTES
Darlyn Wright questioned and confirmed dose . Cabbage and black eyed peas intake recently no other changes .  Darlyn stated its okay to leave dosage on voicemail.

## 2023-01-06 ENCOUNTER — TELEPHONE (OUTPATIENT)
Dept: TRANSPLANT | Facility: CLINIC | Age: 38
End: 2023-01-06
Payer: MEDICAID

## 2023-01-06 NOTE — TELEPHONE ENCOUNTER
[phone] would like a call back regarding patient '' kidney lab work '' . Also reports patient has been dealing with back cramps as of 1/1/23 .     Called her back.  She said he has muscle cramps all over his body, has been taking his lasix as prescribed.  He isn't sick with a cold or flu.  I asked if he takes magnesium, she said the pharmacy stopped filling it a long time ago.  I explained that magnesium helps with muscle cramps and suggested she purchase it over the counter and start taking 400 mg daily.  I told her we have not received labs yet this week but as soon as we get them I will call her with the results.

## 2023-01-06 NOTE — TELEPHONE ENCOUNTER
Contacted  [facility] [phone] f- [phone] to obtain patient lab results  Spoke with Teri  Provided fax number  Awaiting results.

## 2023-01-09 ENCOUNTER — TELEPHONE (OUTPATIENT)
Dept: TRANSPLANT | Facility: CLINIC | Age: 38
End: 2023-01-09
Payer: MEDICAID

## 2023-01-09 LAB — INR PPP: 1.8

## 2023-01-09 NOTE — TELEPHONE ENCOUNTER
Reviewing lab results from Friday.  Potassium 5.4.  Called pt, no answer, no voicemail.  Called SO, left message to call me back to confirm medications  and hold potassium.    1/10/23 1430: Page called back.  She said since he was cramping, he took extra potassium.  She did not have him take magnesium as I suggested last week because insurance didn't pay for it.  I told her that's why I suggested they buy it over the counter and pay for it seeing medicaid isn't paying for medications.  She said she will go to the pharmacy now.

## 2023-01-11 ENCOUNTER — TELEPHONE (OUTPATIENT)
Dept: TRANSPLANT | Facility: CLINIC | Age: 38
End: 2023-01-11
Payer: MEDICAID

## 2023-01-11 ENCOUNTER — ANTI-COAG VISIT (OUTPATIENT)
Dept: CARDIOLOGY | Facility: CLINIC | Age: 38
End: 2023-01-11
Payer: MEDICAID

## 2023-01-11 DIAGNOSIS — Z95.811 LVAD (LEFT VENTRICULAR ASSIST DEVICE) PRESENT: Primary | ICD-10-CM

## 2023-01-11 PROCEDURE — 93793 ANTICOAG MGMT PT WARFARIN: CPT | Mod: ,,,

## 2023-01-11 PROCEDURE — 93793 PR ANTICOAGULANT MGMT FOR PT TAKING WARFARIN: ICD-10-PCS | Mod: ,,,

## 2023-01-11 NOTE — TELEPHONE ENCOUNTER
Contacted [facility] to obtain results    No lab results available  at this time    Understanding verbalized     Spoke with Teri    Fax number confirmed.

## 2023-01-11 NOTE — PROGRESS NOTES
Darlyn Wright questioned and confirmed dose . Patient potassium levels  was high and potassium chloride SA (K-DUR,KLOR-CON) 20 MEQ tablet decreased to two tablets daily . No other changes reported recently .

## 2023-01-17 LAB
EXT ALBUMIN: 4.4
EXT ALT: 19
EXT AST: 30
EXT BILIRUBIN TOTAL: 0.8
EXT BNP: 361
EXT BUN: 10
EXT CALCIUM: 9.1
EXT CHLORIDE: 101
EXT CREATININE: 1.33 MG/DL
EXT GLUCOSE: 117
EXT HEMATOCRIT: 37.8
EXT HEMOGLOBIN: 12.6
EXT MAGNESIUM: 1.9
EXT PLATELETS: 320
EXT POTASSIUM: 4
EXT PROTEIN TOTAL: 7.6
EXT SODIUM: 138 MMOL/L
EXT WBC: 9.6
INR PPP: 1.8

## 2023-01-18 ENCOUNTER — TELEPHONE (OUTPATIENT)
Dept: TRANSPLANT | Facility: CLINIC | Age: 38
End: 2023-01-18
Payer: MEDICAID

## 2023-01-18 NOTE — TELEPHONE ENCOUNTER
CONTACTED  / [facility] [phone]   TO OBTAIN MOST RECENT LAB RESULTS    PATIENT DID HASN'T VISITED LAB AS OF YET THIS WEEK    PROVIDED FAX NUMBER    AWAITING RESULTS.

## 2023-01-18 NOTE — TELEPHONE ENCOUNTER
CONTACTED [facility]   DID NOT RECEIVE ALL OF LABS  SPOKE WITH ABDIRIZAK   PROVIDED FAX NUMBER  AWAITING RESULTS.

## 2023-01-19 ENCOUNTER — ANTI-COAG VISIT (OUTPATIENT)
Dept: CARDIOLOGY | Facility: CLINIC | Age: 38
End: 2023-01-19
Payer: MEDICAID

## 2023-01-19 DIAGNOSIS — Z95.811 LVAD (LEFT VENTRICULAR ASSIST DEVICE) PRESENT: Primary | ICD-10-CM

## 2023-01-19 PROCEDURE — 93793 ANTICOAG MGMT PT WARFARIN: CPT | Mod: ,,,

## 2023-01-19 PROCEDURE — 93793 PR ANTICOAGULANT MGMT FOR PT TAKING WARFARIN: ICD-10-PCS | Mod: ,,,

## 2023-01-19 NOTE — PROGRESS NOTES
Darlyn Wright questioned and confirmed dose .  Patient girlfriend stated he may have had greens intake but  unsure . Has been recently drinking Pedialyte as needed for stomach cramps .  Mild fluid retention no other changes reported.

## 2023-01-23 LAB — INR PPP: 2

## 2023-01-24 ENCOUNTER — ANTI-COAG VISIT (OUTPATIENT)
Dept: CARDIOLOGY | Facility: CLINIC | Age: 38
End: 2023-01-24
Payer: MEDICAID

## 2023-01-24 ENCOUNTER — TELEPHONE (OUTPATIENT)
Dept: TRANSPLANT | Facility: CLINIC | Age: 38
End: 2023-01-24
Payer: MEDICAID

## 2023-01-24 DIAGNOSIS — Z95.811 LVAD (LEFT VENTRICULAR ASSIST DEVICE) PRESENT: Primary | ICD-10-CM

## 2023-01-24 PROCEDURE — 93793 PR ANTICOAGULANT MGMT FOR PT TAKING WARFARIN: ICD-10-PCS | Mod: ,,,

## 2023-01-24 PROCEDURE — 93793 ANTICOAG MGMT PT WARFARIN: CPT | Mod: ,,,

## 2023-01-24 NOTE — TELEPHONE ENCOUNTER
Attempted to speak with patient regarding equipment maintenance due at upcoming clinic appointment on 2/2/23. Unable to leave voicemail patient/caregiver to bring [employer] with them to next appointment for maintenance, MyChart message sent.    It is medically necessary to ensure patient has properly functioning equipment and wearables to prevent infection, injury or death to patient.

## 2023-01-27 ENCOUNTER — TELEPHONE (OUTPATIENT)
Dept: TRANSPLANT | Facility: CLINIC | Age: 38
End: 2023-01-27
Payer: MEDICAID

## 2023-01-27 NOTE — TELEPHONE ENCOUNTER
Patient did not complete lab work this week, patient is scheduled for clinic next week and labs will be checked at this time.

## 2023-02-02 ENCOUNTER — LAB VISIT (OUTPATIENT)
Dept: LAB | Facility: HOSPITAL | Age: 38
End: 2023-02-02
Attending: INTERNAL MEDICINE
Payer: MEDICAID

## 2023-02-02 ENCOUNTER — EDUCATION (OUTPATIENT)
Dept: TRANSPLANT | Facility: CLINIC | Age: 38
End: 2023-02-02

## 2023-02-02 ENCOUNTER — ANTI-COAG VISIT (OUTPATIENT)
Dept: CARDIOLOGY | Facility: CLINIC | Age: 38
End: 2023-02-02
Payer: MEDICAID

## 2023-02-02 ENCOUNTER — OFFICE VISIT (OUTPATIENT)
Dept: TRANSPLANT | Facility: CLINIC | Age: 38
End: 2023-02-02
Payer: MEDICAID

## 2023-02-02 ENCOUNTER — CLINICAL SUPPORT (OUTPATIENT)
Dept: TRANSPLANT | Facility: CLINIC | Age: 38
End: 2023-02-02
Payer: MEDICAID

## 2023-02-02 VITALS — BODY MASS INDEX: 27.27 KG/M2 | WEIGHT: 236 LBS | TEMPERATURE: 98 F | SYSTOLIC BLOOD PRESSURE: 68 MMHG

## 2023-02-02 DIAGNOSIS — R06.02 SOB (SHORTNESS OF BREATH): Primary | ICD-10-CM

## 2023-02-02 DIAGNOSIS — Z95.811 LVAD (LEFT VENTRICULAR ASSIST DEVICE) PRESENT: ICD-10-CM

## 2023-02-02 DIAGNOSIS — I50.42 CHRONIC COMBINED SYSTOLIC AND DIASTOLIC CONGESTIVE HEART FAILURE: ICD-10-CM

## 2023-02-02 DIAGNOSIS — Z79.4 TYPE 2 DIABETES MELLITUS WITH HYPERGLYCEMIA, WITH LONG-TERM CURRENT USE OF INSULIN: ICD-10-CM

## 2023-02-02 DIAGNOSIS — E11.65 TYPE 2 DIABETES MELLITUS WITH HYPERGLYCEMIA, WITH LONG-TERM CURRENT USE OF INSULIN: ICD-10-CM

## 2023-02-02 DIAGNOSIS — Z95.811 LVAD (LEFT VENTRICULAR ASSIST DEVICE) PRESENT: Primary | ICD-10-CM

## 2023-02-02 LAB
ALBUMIN SERPL BCP-MCNC: 3.9 G/DL (ref 3.5–5.2)
ALP SERPL-CCNC: 99 U/L (ref 55–135)
ALT SERPL W/O P-5'-P-CCNC: 29 U/L (ref 10–44)
ANION GAP SERPL CALC-SCNC: 10 MMOL/L (ref 8–16)
AST SERPL-CCNC: 47 U/L (ref 10–40)
BASOPHILS # BLD AUTO: 0.03 K/UL (ref 0–0.2)
BASOPHILS NFR BLD: 0.3 % (ref 0–1.9)
BILIRUB DIRECT SERPL-MCNC: 0.2 MG/DL (ref 0.1–0.3)
BILIRUB SERPL-MCNC: 0.4 MG/DL (ref 0.1–1)
BNP SERPL-MCNC: 375 PG/ML (ref 0–99)
BUN SERPL-MCNC: 14 MG/DL (ref 6–20)
CALCIUM SERPL-MCNC: 9.4 MG/DL (ref 8.7–10.5)
CHLORIDE SERPL-SCNC: 108 MMOL/L (ref 95–110)
CHOLEST SERPL-MCNC: 171 MG/DL (ref 120–199)
CHOLEST/HDLC SERPL: 4.9 {RATIO} (ref 2–5)
CO2 SERPL-SCNC: 20 MMOL/L (ref 23–29)
CREAT SERPL-MCNC: 1.3 MG/DL (ref 0.5–1.4)
CRP SERPL-MCNC: 1.6 MG/L (ref 0–8.2)
DIFFERENTIAL METHOD: ABNORMAL
EOSINOPHIL # BLD AUTO: 0.1 K/UL (ref 0–0.5)
EOSINOPHIL NFR BLD: 0.7 % (ref 0–8)
ERYTHROCYTE [DISTWIDTH] IN BLOOD BY AUTOMATED COUNT: 16.4 % (ref 11.5–14.5)
EST. GFR  (NO RACE VARIABLE): >60 ML/MIN/1.73 M^2
GLUCOSE SERPL-MCNC: 117 MG/DL (ref 70–110)
HCT VFR BLD AUTO: 39.4 % (ref 40–54)
HDLC SERPL-MCNC: 35 MG/DL (ref 40–75)
HDLC SERPL: 20.5 % (ref 20–50)
HGB BLD-MCNC: 12.3 G/DL (ref 14–18)
IMM GRANULOCYTES # BLD AUTO: 0.02 K/UL (ref 0–0.04)
IMM GRANULOCYTES NFR BLD AUTO: 0.2 % (ref 0–0.5)
INR PPP: 3 (ref 0.8–1.2)
LDH SERPL L TO P-CCNC: 270 U/L (ref 110–260)
LDLC SERPL CALC-MCNC: 67.6 MG/DL (ref 63–159)
LYMPHOCYTES # BLD AUTO: 3.4 K/UL (ref 1–4.8)
LYMPHOCYTES NFR BLD: 31.1 % (ref 18–48)
MAGNESIUM SERPL-MCNC: 1.8 MG/DL (ref 1.6–2.6)
MCH RBC QN AUTO: 28.3 PG (ref 27–31)
MCHC RBC AUTO-ENTMCNC: 31.2 G/DL (ref 32–36)
MCV RBC AUTO: 91 FL (ref 82–98)
MONOCYTES # BLD AUTO: 0.8 K/UL (ref 0.3–1)
MONOCYTES NFR BLD: 7.4 % (ref 4–15)
NEUTROPHILS # BLD AUTO: 6.6 K/UL (ref 1.8–7.7)
NEUTROPHILS NFR BLD: 60.3 % (ref 38–73)
NONHDLC SERPL-MCNC: 136 MG/DL
NRBC BLD-RTO: 0 /100 WBC
PHOSPHATE SERPL-MCNC: 2 MG/DL (ref 2.7–4.5)
PLATELET # BLD AUTO: 255 K/UL (ref 150–450)
PMV BLD AUTO: 10.1 FL (ref 9.2–12.9)
POTASSIUM SERPL-SCNC: 3.8 MMOL/L (ref 3.5–5.1)
PREALB SERPL-MCNC: 24 MG/DL (ref 20–43)
PROT SERPL-MCNC: 8 G/DL (ref 6–8.4)
PROTHROMBIN TIME: 29.3 SEC (ref 9–12.5)
RBC # BLD AUTO: 4.35 M/UL (ref 4.6–6.2)
SODIUM SERPL-SCNC: 138 MMOL/L (ref 136–145)
TRIGL SERPL-MCNC: 342 MG/DL (ref 30–150)
WBC # BLD AUTO: 10.88 K/UL (ref 3.9–12.7)

## 2023-02-02 PROCEDURE — 99215 OFFICE O/P EST HI 40 MIN: CPT | Mod: S$PBB,25,, | Performed by: INTERNAL MEDICINE

## 2023-02-02 PROCEDURE — 93750 INTERROGATION VAD IN PERSON: CPT | Mod: S$PBB,,, | Performed by: INTERNAL MEDICINE

## 2023-02-02 PROCEDURE — 36415 COLL VENOUS BLD VENIPUNCTURE: CPT | Performed by: INTERNAL MEDICINE

## 2023-02-02 PROCEDURE — 93750 PR INTERROGATE VENT ASSIST DEV, IN PERSON, W PHYSICIAN ANALYSIS: ICD-10-PCS | Mod: S$PBB,,, | Performed by: INTERNAL MEDICINE

## 2023-02-02 PROCEDURE — 86140 C-REACTIVE PROTEIN: CPT | Performed by: INTERNAL MEDICINE

## 2023-02-02 PROCEDURE — 85610 PROTHROMBIN TIME: CPT | Performed by: INTERNAL MEDICINE

## 2023-02-02 PROCEDURE — 99215 PR OFFICE/OUTPT VISIT, EST, LEVL V, 40-54 MIN: ICD-10-PCS | Mod: S$PBB,25,, | Performed by: INTERNAL MEDICINE

## 2023-02-02 PROCEDURE — 82248 BILIRUBIN DIRECT: CPT | Performed by: INTERNAL MEDICINE

## 2023-02-02 PROCEDURE — 3008F PR BODY MASS INDEX (BMI) DOCUMENTED: ICD-10-PCS | Mod: CPTII,,, | Performed by: INTERNAL MEDICINE

## 2023-02-02 PROCEDURE — 80053 COMPREHEN METABOLIC PANEL: CPT | Performed by: INTERNAL MEDICINE

## 2023-02-02 PROCEDURE — 99999 PR PBB SHADOW E&M-EST. PATIENT-LVL I: ICD-10-PCS | Mod: PBBFAC,,, | Performed by: INTERNAL MEDICINE

## 2023-02-02 PROCEDURE — 99211 OFF/OP EST MAY X REQ PHY/QHP: CPT | Mod: PBBFAC,25 | Performed by: INTERNAL MEDICINE

## 2023-02-02 PROCEDURE — 84134 ASSAY OF PREALBUMIN: CPT | Performed by: INTERNAL MEDICINE

## 2023-02-02 PROCEDURE — 83615 LACTATE (LD) (LDH) ENZYME: CPT | Performed by: INTERNAL MEDICINE

## 2023-02-02 PROCEDURE — 84100 ASSAY OF PHOSPHORUS: CPT | Performed by: INTERNAL MEDICINE

## 2023-02-02 PROCEDURE — 83735 ASSAY OF MAGNESIUM: CPT | Performed by: INTERNAL MEDICINE

## 2023-02-02 PROCEDURE — 3008F BODY MASS INDEX DOCD: CPT | Mod: CPTII,,, | Performed by: INTERNAL MEDICINE

## 2023-02-02 PROCEDURE — 85025 COMPLETE CBC W/AUTO DIFF WBC: CPT | Performed by: INTERNAL MEDICINE

## 2023-02-02 PROCEDURE — 93750 INTERROGATION VAD IN PERSON: CPT | Mod: PBBFAC | Performed by: INTERNAL MEDICINE

## 2023-02-02 PROCEDURE — 99999 PR PBB SHADOW E&M-EST. PATIENT-LVL I: CPT | Mod: PBBFAC,,, | Performed by: INTERNAL MEDICINE

## 2023-02-02 PROCEDURE — 80061 LIPID PANEL: CPT | Performed by: INTERNAL MEDICINE

## 2023-02-02 PROCEDURE — 83880 ASSAY OF NATRIURETIC PEPTIDE: CPT | Performed by: INTERNAL MEDICINE

## 2023-02-02 NOTE — PROGRESS NOTES
Met with pt for weight mgmt     Pt has gained 7kg in 1.5 months (from 12/15)     Pt reports good skinny, no n/v/d/c, no prob chew/swallow     Pt reports eating only 1 meal a day that is actually made up of 3 meals that his wife cooks him. Pt named rice and gravy, chili with crackers, and waffles all in 30-45 minutes. Pt will then snack on fruit if he does get hungry but he will drink more than a gallon of fluid between water, tea, Powerade and soda per day.     Recc pt begin exercising or being more active throughout the day. Recc pt break up his large meal into 3 meals an hour to two apart. Recc pt limit fluid to less than 2L a day. Will follow up with pt next clinic visit

## 2023-02-02 NOTE — LETTER
February 2, 2023        Inderjit Hendricks  [address]  [address]  [place] [address]  Phone: [phone]  Fax: [phone]     Josh Pulido  [address] [facility] [address]  Phone: [phone]  Fax: [phone]             [id number]  [address]  [place] [address]  Phone: [phone]   Patient: Kevan Queen   MR Number: [id number]   YOB: 1985   Date of Visit: 2/2/2023       Dear Dr. Inderjit Hendricks, Josh Pulido    Thank you for referring Kevan Queen to me for evaluation. Attached you will find relevant portions of my assessment and plan of care.    If you have questions, please do not hesitate to call me. I look forward to following Kevan Queen along with you.    Sincerely,    Marlo Marques MD    Enclosure    If you would like to receive this communication electronically, please contact [email or web address] or [phone] to request [employer] access.    [employer] is a tool which provides read-only access to select patient information with whom you have a relationship. Its easy to use and provides real time access to review your patients record including encounter summaries, notes, results, and demographic information.    If you feel you have received this communication in error or would no longer like to receive these types of communications, please e-mail [email or web address]

## 2023-02-02 NOTE — PROGRESS NOTES
"Date of Implant with Heartmate 3 LVAD: 6/29/22    PATIENT ARRIVED IN CLINIC:  Ambulatory   Accompanied by: wife and kids  Complaints/reason for visit today: routine    Vitals  Temperature, oral:   Temp Readings from Last 1 Encounters:   02/02/23 98 °F (36.7 °C) (Oral)     Blood Pressure:   BP Readings from Last 3 Encounters:   02/02/23 (!) 68/0   12/16/22 (!) 88/0   12/15/22 (!) 90/0        VAD Interrogation:  TXP JOY INTERROGATIONS 2/2/2023 2/2/2023 12/16/2022   Type HeartMate3 HeartMate3 -   Flow 4.3 3.4 -   Speed 5500 5100 -   PI 3.6 7.0 -   Power (Serna) 4.1 3.7 -   LSL 5100 4700 -   Pulsatility No Pulse - Intermittent pulse     HCT:   Lab Results   Component Value Date    HCT 39.4 (L) 02/02/2023    HCT 47 12/15/2022       History Log: JxP  Problems / Issues / Alarms with VAD if any: None noted  VAD Sounds: HM3     VAD Binder With Patient: no   Reviewed VAD Numbers In Binder: no    Equipment:  Emergency Equipment With Patient: yes   Any Equipment Issues: Back up  controller [facility] [id number] self test completed and EBB charged.    It is medically necessary to ensure patient has properly functioning equipment and wearables to prevent infection, injury or death to patient.     DLES Assessment:  Appearance Of Driveline: "1".  Pt c/o pain to site recently.  He has gained weight.  Incorporated  Antibiotics: NO  Velour: no  Manual & Visual Inspection Of Driveline: No kinks or tears noted  Stabilization Device In Use: yes, melendez securement device    Heartmate 3 Module Cable:  No yellow exposed and Attempted to unscrew modular cable to ensure it will be able to come lose in the event we ever need to change the modular cable while patient held the driveline in place so it would not move. Modular cable connection able to be unscrewed and re-tightened. Instructed pt to perform this weekly.    Patient MyChart Questionnaire: No flowsheet data found.     Assessment:   PAIN: NO, but pt verbalized he is seeing palliative care for pain " "management to midsternal incision  Complaints Of Nausea / Vomiting: None noted    Appearance and Frequency Of Stools: normal and formed without blood & daily  Color Of Urine: clear/yellow  Coping/Depression/Anxiety: coping okay, angry, anxious, and depressed.  Wife verbalized he had an appt with psych and he is seeing palliative care.  He verbalized he isn't on antidepressants and isn't sure where all the anger is coming from.   Sleep Habits: "all day" per wife  Sleep Aids: None noted  Showering: Yes, reminded to change dressing immediately after drying off  Activity/Exercise: trying to stay busy   Driving: Yes. Reminded to pull over should there be an alarm before looking down at controller.    DLES Dressing Care:   Frequency of Dressing Changes: daily & daily kit  Pt In Need Of Management Kits?:yes -   2 Box of daily kit  It is medically necessary to have VAD management kits in order to prevent infection or to assist in the healing of an infected DLES.    Labs:    Chemistry        Component Value Date/Time     02/02/2023 1315    K 3.8 02/02/2023 1315     02/02/2023 1315    CO2 20 (L) 02/02/2023 1315    BUN 14 02/02/2023 1315    CREATININE 1.3 02/02/2023 1315     (H) 02/02/2023 1315        Component Value Date/Time    CALCIUM 9.4 02/02/2023 1315    ALKPHOS 99 02/02/2023 1315    AST 47 (H) 02/02/2023 1315    ALT 29 02/02/2023 1315    BILITOT 0.4 02/02/2023 1315    ESTGFRAFRICA >60.0 07/27/2022 1229    EGFRNONAA 54.2 (A) 07/27/2022 1229            Magnesium   Date Value Ref Range Status   02/02/2023 1.8 1.6 - 2.6 mg/dL Final       Lab Results   Component Value Date    WBC 10.88 02/02/2023    HGB 12.3 (L) 02/02/2023    HCT 39.4 (L) 02/02/2023    MCV 91 02/02/2023     02/02/2023       Lab Results   Component Value Date    INR 3.0 (H) 02/02/2023    INR 2.0 01/23/2023    INR 1.8 01/17/2023    PROTIME 23.7 (H) 12/30/2022    PROTIME 20.9 (H) 11/23/2022    PROTIME 17.0 (H) 11/16/2022       BNP "   Date Value Ref Range Status   02/02/2023 375 (H) 0 - 99 pg/mL Final     Comment:     Values of less than 100 pg/ml are consistent with non-CHF populations.   12/16/2022 37 0 - 99 pg/mL Final     Comment:     Values of less than 100 pg/ml are consistent with non-CHF populations.   12/15/2022 91 0 - 99 pg/mL Final     Comment:     Values of less than 100 pg/ml are consistent with non-CHF populations.     Natriuretic Peptide   Date Value Ref Range Status   12/30/2022 188.1 (H) <=100.0 pg/mL Final       LD   Date Value Ref Range Status   02/02/2023 270 (H) 110 - 260 U/L Final     Comment:     Results are increased in hemolyzed samples.   12/16/2022 224 110 - 260 U/L Final     Comment:     Results are increased in hemolyzed samples.   12/15/2022 240 110 - 260 U/L Final     Comment:     Results are increased in hemolyzed samples.       Labs reviewed with patient: YES     Medication Reconciliation: per MA.  New Medication Detail Provided: no  Coumadin Managed by: [facility]    Education: Reviewed driveline care, emergency procedures, how to change the controller, alarms with patient, as well as discussed how to page the VAD coordinator in case of an emergency.   Educated patient/family that chest compressions are allowed in the event they are needed.    Reminded patient/caregiver not to touch their face and to cover their mouth when they cough or sneeze.   Vaccines: Pt informed that we are encouraging all VAD patients to receive the Flu and COVID vaccines and boosters. Informed pt that they can take tylenol but should avoid other NSAIDs.       Plans/Needs: Primacor infusing at 0.25 mcg/kg/min via MELISSA PICC.  Drskareem CDIPt and wife report he hasn't had any sezures since Sept.  I explained that is something positive to focus on then. Pt will RTC in 1 month.  No cahnges made today.  Refer to MD note     Hurricane Season: No

## 2023-02-02 NOTE — PROGRESS NOTES
"Subjective:   Patient ID:  Kevan Queen is a 37 y.o. male who presents for LVAD followup visit.    Implant date: 6/23/22    TXP JOY INTERROGATIONS 2/2/2023   Type HeartMate3   Flow 4.3   Speed 5500   PI 3.6   Power (Serna) 4.1   LSL 5100   Pulsatility No Pulse     38 YO M w/ PMH of NICM, ? 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 He underwent RVAD removal and chest closure 6/30/2022.  He also completed a course of IV Abx for staph epi. He was weaned off  but he had to restarted due to RVF. He was eventually transitioned to milrinone (secondary to  shortage) now on 0.25 mcg/kg/min. He also has a history of unclear syncope vs seizures and is followed by neuro for this and is on Keppra.    He was seen in clinic last in December and was admitted after he was found to be hyperglycemic with glucose in the 500s. Endocrine consulted and his insulin regimen was adjusted. He presents today for follow up.    Implant Date:6/29/2022  Initials:JxP     Heartmate 3 RPM 5100     INR goal: 2-3   Bridge with Heparin/lovenox   Antiplatelets: Alon trial   Inotropes: Milrinone @ 0.25 mcg/kg/min in D5W     GIB:  Integrelin/TPA:  Stroke:     DLES:"1"  ABX:   Dressing: Daily with daily kit     Appts: Monthly  Home Health: na     Speed changes  Date-Speed   7/6/22 5300   7/11/22 5200      : 5100          Comments:      6 Minute Walk (6 months post implant) ;Done      Cardiac Rehab Order Placed within 90 Days Post Implant:   Due starting 6 weeks post Implant: DUE 8/10/22  Completed: yes Date: 8/4/22     Last Echo:09/01/2022 Due: 3/2023 (Every 6 months)  Last Lipids: 04/18/2022 Due: 10/2022 (Every 6 months)    Review of Systems   Constitutional: Positive for weight gain. Negative for chills and fever.   HENT:  Negative for hearing " loss.    Eyes:  Negative for visual disturbance.   Cardiovascular:  Positive for dyspnea on exertion. Negative for chest pain, irregular heartbeat, leg swelling, orthopnea, palpitations, paroxysmal nocturnal dyspnea and syncope.   Respiratory:  Positive for shortness of breath. Negative for cough.    Musculoskeletal:  Negative for muscle weakness.   Gastrointestinal:  Negative for diarrhea, nausea and vomiting.   Neurological:  Negative for focal weakness.   Psychiatric/Behavioral:  Negative for memory loss.      Objective:   Blood pressure (!) 68/0, temperature 98 °F (36.7 °C), temperature source Oral, weight 107 kg (236 lb).body mass index is 27.27 kg/m².    Doppler: 68    Physical Exam  Vitals reviewed.   Constitutional:       General: He is not in acute distress.  HENT:      Head: Atraumatic.   Eyes:      Extraocular Movements: Extraocular movements intact.   Cardiovascular:      Comments: LVAD hum present. JVP to mid neck at 45 degrees.  Pulmonary:      Breath sounds: Normal breath sounds.   Abdominal:      Palpations: Abdomen is soft.      Tenderness: There is no abdominal tenderness.   Musculoskeletal:         General: Normal range of motion.      Right lower leg: No edema.      Left lower leg: No edema.   Neurological:      General: No focal deficit present.      Mental Status: He is alert and oriented to person, place, and time.       Lab Results   Component Value Date    WBC 10.88 02/02/2023    HGB 12.3 (L) 02/02/2023    HCT 39.4 (L) 02/02/2023    MCV 91 02/02/2023     02/02/2023    CHLORIDE 106 12/30/2022    CO2 20 (L) 02/02/2023    CREATININE 1.3 02/02/2023    GLUCOSE 226 (H) 12/30/2022    CALCIUM 9.4 02/02/2023    ALKALINEPHOS 98 06/02/2022    ALBUMIN 3.9 02/02/2023    AST 47 (H) 02/02/2023     (H) 02/02/2023    ALT 29 02/02/2023     (H) 02/02/2023       Lab Results   Component Value Date    INR 3.0 (H) 02/02/2023    INR 2.0 01/23/2023    INR 1.8 01/17/2023    PROTIME 23.7 (H)  12/30/2022    PROTIME 20.9 (H) 11/23/2022    PROTIME 17.0 (H) 11/16/2022       BNP   Date Value Ref Range Status   02/02/2023 375 (H) 0 - 99 pg/mL Final     Comment:     Values of less than 100 pg/ml are consistent with non-CHF populations.   12/16/2022 37 0 - 99 pg/mL Final     Comment:     Values of less than 100 pg/ml are consistent with non-CHF populations.   12/15/2022 91 0 - 99 pg/mL Final     Comment:     Values of less than 100 pg/ml are consistent with non-CHF populations.     Natriuretic Peptide   Date Value Ref Range Status   12/30/2022 188.1 (H) <=100.0 pg/mL Final       LD   Date Value Ref Range Status   02/02/2023 270 (H) 110 - 260 U/L Final     Comment:     Results are increased in hemolyzed samples.   12/16/2022 224 110 - 260 U/L Final     Comment:     Results are increased in hemolyzed samples.   12/15/2022 240 110 - 260 U/L Final     Comment:     Results are increased in hemolyzed samples.       Labs were reviewed with the patient.    No results found for this or any previous visit.    No results found for this or any previous visit.      Assessment:      1. SOB (shortness of breath)    2. LVAD (left ventricular assist device) present    3. Chronic combined systolic and diastolic congestive heart failure    4. Type 2 diabetes mellitus with hyperglycemia, with long-term current use of insulin        Plan:     S/P LVAD  RV failure on home milrinone  - Doing well overall, has some class II-II symptoms  - JVP elevated on exam today but in the setting of RV failure I am hesitant to increase his diuretics at this time, especially as his creatinine is the best it has been for a few months  - Continue current medical regimen including lasix 80 mg daily  - Doppler is 68, DL is a 1    Weight gain  - Endorses weight gain related to poor diet and sedentary lifestyle  - met with nutrition at end of visit    Patient is now NYHA II  Recommend 2 gram sodium restriction and 1500cc fluid restriction.  Encourage  physical activity with graded exercise program.  Requested patient to weigh themselves daily, and to notify us if their weight increases by more than 3 lbs in 1 day or 5 lbs in 1 week.     Patient advised that it is recommended that all patients, and their close contacts and household members receive Covid vaccination.    Listed for transplant: No    UNOS Patient Status  Functional Status: 90% - Able to carry on normal activity: minor symptoms of disease  Physical Capacity: No Limitations  Working for Income: Unknown      Additionally, the patient has a patient centered goal of being able to improve their quality of life

## 2023-02-07 ENCOUNTER — OFFICE VISIT (OUTPATIENT)
Dept: FAMILY MEDICINE | Facility: CLINIC | Age: 38
End: 2023-02-07
Payer: MEDICAID

## 2023-02-07 VITALS
TEMPERATURE: 98 F | WEIGHT: 235 LBS | SYSTOLIC BLOOD PRESSURE: 99 MMHG | DIASTOLIC BLOOD PRESSURE: 65 MMHG | BODY MASS INDEX: 29.22 KG/M2 | RESPIRATION RATE: 20 BRPM | OXYGEN SATURATION: 100 % | HEART RATE: 99 BPM | HEIGHT: 75 IN

## 2023-02-07 DIAGNOSIS — M25.50 ARTHRALGIA, UNSPECIFIED JOINT: ICD-10-CM

## 2023-02-07 DIAGNOSIS — E11.65 TYPE 2 DIABETES MELLITUS WITH HYPERGLYCEMIA, WITH LONG-TERM CURRENT USE OF INSULIN: Primary | ICD-10-CM

## 2023-02-07 DIAGNOSIS — Z79.4 TYPE 2 DIABETES MELLITUS WITH HYPERGLYCEMIA, WITH LONG-TERM CURRENT USE OF INSULIN: Primary | ICD-10-CM

## 2023-02-07 LAB — HBA1C MFR BLD: 8.5 %

## 2023-02-07 PROCEDURE — 3008F BODY MASS INDEX DOCD: CPT | Mod: CPTII,,, | Performed by: NURSE PRACTITIONER

## 2023-02-07 PROCEDURE — 83036 HEMOGLOBIN GLYCOSYLATED A1C: CPT | Mod: PBBFAC,PN | Performed by: NURSE PRACTITIONER

## 2023-02-07 PROCEDURE — 99214 PR OFFICE/OUTPT VISIT, EST, LEVL IV, 30-39 MIN: ICD-10-PCS | Mod: S$PBB,,, | Performed by: NURSE PRACTITIONER

## 2023-02-07 PROCEDURE — 99215 OFFICE O/P EST HI 40 MIN: CPT | Mod: PBBFAC,PN | Performed by: NURSE PRACTITIONER

## 2023-02-07 PROCEDURE — 3078F DIAST BP <80 MM HG: CPT | Mod: CPTII,,, | Performed by: NURSE PRACTITIONER

## 2023-02-07 PROCEDURE — 1159F MED LIST DOCD IN RCRD: CPT | Mod: CPTII,,, | Performed by: NURSE PRACTITIONER

## 2023-02-07 PROCEDURE — 3074F SYST BP LT 130 MM HG: CPT | Mod: CPTII,,, | Performed by: NURSE PRACTITIONER

## 2023-02-07 PROCEDURE — 1160F PR REVIEW ALL MEDS BY PRESCRIBER/CLIN PHARMACIST DOCUMENTED: ICD-10-PCS | Mod: CPTII,,, | Performed by: NURSE PRACTITIONER

## 2023-02-07 PROCEDURE — 99214 OFFICE O/P EST MOD 30 MIN: CPT | Mod: S$PBB,,, | Performed by: NURSE PRACTITIONER

## 2023-02-07 PROCEDURE — 1160F RVW MEDS BY RX/DR IN RCRD: CPT | Mod: CPTII,,, | Performed by: NURSE PRACTITIONER

## 2023-02-07 PROCEDURE — 3008F PR BODY MASS INDEX (BMI) DOCUMENTED: ICD-10-PCS | Mod: CPTII,,, | Performed by: NURSE PRACTITIONER

## 2023-02-07 PROCEDURE — 3074F PR MOST RECENT SYSTOLIC BLOOD PRESSURE < 130 MM HG: ICD-10-PCS | Mod: CPTII,,, | Performed by: NURSE PRACTITIONER

## 2023-02-07 PROCEDURE — 3078F PR MOST RECENT DIASTOLIC BLOOD PRESSURE < 80 MM HG: ICD-10-PCS | Mod: CPTII,,, | Performed by: NURSE PRACTITIONER

## 2023-02-07 PROCEDURE — 1159F PR MEDICATION LIST DOCUMENTED IN MEDICAL RECORD: ICD-10-PCS | Mod: CPTII,,, | Performed by: NURSE PRACTITIONER

## 2023-02-07 RX ORDER — HYDROCODONE BITARTRATE AND ACETAMINOPHEN 5; 325 MG/1; MG/1
1 TABLET ORAL EVERY 8 HOURS PRN
Qty: 30 TABLET | Refills: 0 | Status: SHIPPED | OUTPATIENT
Start: 2023-02-07 | End: 2023-03-07 | Stop reason: SDUPTHER

## 2023-02-07 RX ORDER — MILRINONE LACTATE 1 MG/ML
INJECTION INTRAVENOUS
COMMUNITY
Start: 2023-01-19 | End: 2023-02-07

## 2023-02-07 NOTE — PROGRESS NOTES
Patient Name: Kevan Queen   : 1985  MRN: [id number]     SUBJECTIVE DATA:    CHIEF COMPLAINT:   Kevan Queen is a 37 y.o. male who presents to clinic today with Follow-up (F/u)        HPI:  23:  Follow-up 4 month. S/p LVAD insertion.  Is doing well. Did go take a walk yesterday and was wiped after.  Has not had A1c measured in system.  Glucose 117 on labs.  Tells me that his fasting sugars are  and post prandial are less than 180.  Is on Insulin at meal time and Lantus at night.  Compliant with meds and has had no episodes of hypoglycemia.    Last triglycerides over 300.  Not taking Lovaza as previously prescribed.  Has gained weight since last appointment.  Is trying to be more active.    Today c/o generalized joint pain in chest, fingers, knees.  Is scheduled to see palliative medicine provider in March and wife has tried to call her but no return call yet.  She will call again.      22:  FU after LVAD insertion.  Was going to have right heart cath and had seizure in parking lot prior to procedure.  He was told he had a stroke. NO providers want him to get cardiac rehab but wife unable to find any place locally for this.  He was told to see PCP for wellness visit. I referred him to diabetic education as per [facility] staff request but he has not gotten an appt. He was referred to Endocrine for possible Type 1 DM. States he sees NP in  for Endocrine.  I last saw pt in May.     Kevan is requesting a referral to Psychiatry.  He states that his doctors in [place] wanted him to see someone to talk to about his issues.  He states that it is although he feels happy to be alive, he knows that he has some agitation and anger that is controlling him and he feels they he wants somebody to talk to that is not family since they are not fully able to understand his situation.  He does not report any suicidal ideations.  States he has been through a lot and does not like having this LVAD  "attached to his body.  He does not feel like he has any quality of life currently.  He would just like to have a normal life for as long as that will be to spin with his children and his wife.  Would like a Dexcom but sees Endocrine soon. Takes insulin 3-4 times daily and also finger sticks 3-4 times a day currently.      5/31/22:  Pt here today after inpt stay in [place] for heart failure, was referred for a heart transplant.  Pt was dx with DM while inpatient and is in need of Diabetic Education.  Patient is noncompliant with clinic visits. Referred for heart transplant 4/11/22.  Appt 6/8/22 for transplant.        "He was been followed in clinic and underwent RHC 4/8/2022 with biventricular elevated filling pressures and echo showing EF 10%, LVEDD 70 mm, severe RVE and RVD with TAPSE 1.4, severe MR and subsequently admitted and discharged 4/25/2022 after aggressive diuresis. Advanced pathway was started"     Last glucose 5/27 was 191.  Ferritin normal at 150.   Pending referrals to DE, Ortho, and Endocrine. Last labs showed hgb 13.8/hct 39.8. BUN 26/creat 1.5 on 5/27/22 labs. Glucose 171 on labs  Pt is c/o a pain in his throat deep in neck.  No redness or coughing or signs of strep noted.  Got tested for COVID prior to D/C from [place]. Denies any procedure being done there.  Wanted to take lozenges as wife just got over cold/sore throat but did not know if he could take them.   Pt is currently on Novolog 12 units TID, Detemir 23 units daily. He gets montly labs ordered.  QUINTIN 65 is pending.  Told he may have type 1 dm. Pending referral to Endocrine.  Left Shoulder pain secondary to device in hospital, pending referral to Ortho.  Anemia, stable on last draw. Iron stores were 243 in April this year.  Anxiety: pt has lots of anxiety related to health condition.  He was started on Lexapro but has not followed up with psych.  Remeron is helping for sleep at night.  He notes that he has issues and is requesting " "to be referred to someone to talk to, psychiatrist specifically.  He does not think Lexapro is helping anxiety and states Buspirone does not work either.            ALLERGIES:   Review of patient's allergies indicates:   Allergen Reactions    Aspirin Other (See Comments)     Mr. Thacker is enrolled in Dr. Paige's Alon Trial and cannot have any aspirin and/or aspirin-containing products. DO NOT cancel any orders for INV Aspirin 100 mg/Placebo. If you have questions, please contact Isabel @ [phone], [phone],[email or web address], secure chat or MS Teams message.    Bumex [bumetanide] Hives    Lactose Diarrhea     Other reaction(s): Abdominal distension, gaseous    Torsemide Hives         ROS:  Review of Systems   Musculoskeletal:  Positive for back pain, joint pain and myalgias.   All other systems reviewed and are negative.      OBJECTIVE DATA:  Vital signs  Vitals:    02/07/23 1219   BP: 99/65   BP Location: Left arm   Patient Position: Sitting   BP Method: Large (Automatic)   Pulse: 99   Resp: 20   Temp: 97.9 °F (36.6 °C)   TempSrc: Oral   SpO2: 100%   Weight: 106.6 kg (235 lb)   Height: 6' 3" (1.905 m)      Body mass index is 29.37 kg/m².    PHYSICAL EXAM:   Physical Exam  Vitals reviewed.   Constitutional:       Appearance: Normal appearance.   HENT:      Head: Atraumatic.   Cardiovascular:      Rate and Rhythm: Normal rate and regular rhythm.      Pulses: Normal pulses.      Heart sounds: Normal heart sounds.   Pulmonary:      Effort: Pulmonary effort is normal.      Breath sounds: Normal breath sounds.   Musculoskeletal:         General: Normal range of motion.      Cervical back: Normal range of motion.   Skin:     General: Skin is warm and dry.   Neurological:      General: No focal deficit present.      Mental Status: He is alert and oriented to person, place, and time.   Psychiatric:         Mood and Affect: Mood normal.         Behavior: Behavior normal.        ASSESSMENT/PLAN:  1. Type 2 diabetes " mellitus with hyperglycemia, with long-term current use of insulin  Overview:  Lab Results   Component Value Date    HGBA1C 9.2 (H) 05/20/2022         Orders:  -     POCT HEMOGLOBIN A1C    2. Arthralgia, unspecified joint  -     HYDROcodone-acetaminophen (NORCO) 5-325 mg per tablet; Take 1 tablet by mouth every 8 (eight) hours as needed for Pain.  Dispense: 30 tablet; Refill: 0           RESULTS:  Recent Results (from the past 1008 hour(s))   BNP    Collection Time: 12/30/22 12:31 PM   Result Value Ref Range    Natriuretic Peptide 188.1 (H) <=100.0 pg/mL   Comprehensive Metabolic Panel    Collection Time: 12/30/22 12:31 PM   Result Value Ref Range    Sodium Level 137 136 - 145 mmol/L    Potassium Level 5.1 3.5 - 5.1 mmol/L    Chloride 106 98 - 107 mmol/L    Carbon Dioxide 21 (L) 22 - 29 mmol/L    Glucose Level 226 (H) 74 - 100 mg/dL    Blood Urea Nitrogen 6.9 (L) 8.9 - 20.6 mg/dL    Creatinine 1.53 (H) 0.73 - 1.18 mg/dL    Calcium Level Total 9.6 8.4 - 10.2 mg/dL    Protein Total 7.9 6.4 - 8.3 gm/dL    Albumin Level 3.9 3.5 - 5.0 g/dL    Globulin 4.0 (H) 2.4 - 3.5 gm/dL    Albumin/Globulin Ratio 1.0 (L) 1.1 - 2.0 ratio    Bilirubin Total 0.5 <=1.5 mg/dL    Alkaline Phosphatase 107 40 - 150 unit/L    Alanine Aminotransferase 32 0 - 55 unit/L    Aspartate Aminotransferase 39 (H) 5 - 34 unit/L    eGFR 60 mls/min/1.73/m2   Magnesium    Collection Time: 12/30/22 12:31 PM   Result Value Ref Range    Magnesium Level 1.90 1.60 - 2.60 mg/dL   Protime-INR    Collection Time: 12/30/22 12:31 PM   Result Value Ref Range    PT 23.7 (H) 12.5 - 14.5 seconds    INR 2.16 (H) 0.00 - 1.30   CBC with Differential    Collection Time: 12/30/22 12:31 PM   Result Value Ref Range    WBC 9.8 4.5 - 11.5 x10(3)/mcL    RBC 4.69 (L) 4.70 - 6.10 x10(6)/mcL    Hgb 13.5 (L) 14.0 - 18.0 gm/dL    Hct 43.1 42.0 - 52.0 %    MCV 91.9 80.0 - 94.0 fL    MCH 28.8 pg    MCHC 31.3 (L) 33.0 - 36.0 mg/dL    RDW 15.9 (H) 11.6 - 14.4 %    Platelet 329 140 - 371  x10(3)/mcL    MPV 9.9 9.4 - 12.4 fL    Neut % 61.2 %    Lymph % 28.9 %    Mono % 8.1 %    Eos % 1.0 %    Basophil % 0.4 %    Lymph # 2.84 0.6 - 4.6 x10(3)/mcL    Neut # 6.02 2.1 - 9.2 x10(3)/mcL    Mono # 0.80 0.1 - 1.3 x10(3)/mcL    Eos # 0.10 0 - 0.9 x10(3)/mcL    Baso # 0.04 0 - 0.2 x10(3)/mcL    IG# 0.04 0 - 0.04 x10(3)/mcL    IG% 0.4 %    NRBC% 0.0 0 - 1 %   Prothrombin w/ INR and PTT    Collection Time: 01/03/23 12:00 AM   Result Value Ref Range    INR 1.8    HEART VAD LABS (EXTERNAL)    Collection Time: 01/03/23 12:00 AM   Result Value Ref Range    EXT WBC 7.9     EXT Hemoglobin 12.1     EXT Hematocrit 35.7     EXT Platelets 291     EXT Glucose 90     EXT BUN 8     EXT Creatinine 1.24 mg/dL    EXT Sodium 143 mmol/L    EXT Potassium 5.4     EXT Chloride 109     EXT Calcium 9.6     EXT Magnesium 1.7     EXT Albumin 4.1     EXT Protein total 7.5     EXT AST 61     EXT ALT 29     EXT BilirubiN Total 0.8     EXT     Prothrombin w/ INR and PTT    Collection Time: 01/09/23 12:00 AM   Result Value Ref Range    INR 1.8    HEART VAD LABS (EXTERNAL)    Collection Time: 01/17/23 12:00 AM   Result Value Ref Range    EXT WBC 9.6     EXT Hemoglobin 12.6     EXT Hematocrit 37.8     EXT Platelets 320     EXT Glucose 117     EXT BUN 10     EXT Creatinine 1.33 mg/dL    EXT Sodium 138 mmol/L    EXT Potassium 4.0     EXT Chloride 101     EXT Calcium 9.1     EXT Magnesium 1.9     EXT Albumin 4.4     EXT Protein total 7.6     EXT AST 30     EXT ALT 19     EXT BilirubiN Total 0.8     EXT     Prothrombin w/ INR and PTT    Collection Time: 01/17/23 12:00 AM   Result Value Ref Range    INR 1.8    Prothrombin w/ INR and PTT    Collection Time: 01/23/23 12:00 AM   Result Value Ref Range    INR 2.0    Bilirubin, Direct    Collection Time: 02/02/23  1:15 PM   Result Value Ref Range    Bilirubin, Direct 0.2 0.1 - 0.3 mg/dL   BNP    Collection Time: 02/02/23  1:15 PM   Result Value Ref Range     (H) 0 - 99 pg/mL   CBC  Auto Differential    Collection Time: 02/02/23  1:15 PM   Result Value Ref Range    WBC 10.88 3.90 - 12.70 K/uL    RBC 4.35 (L) 4.60 - 6.20 M/uL    Hemoglobin 12.3 (L) 14.0 - 18.0 g/dL    Hematocrit 39.4 (L) 40.0 - 54.0 %    MCV 91 82 - 98 fL    MCH 28.3 27.0 - 31.0 pg    MCHC 31.2 (L) 32.0 - 36.0 g/dL    RDW 16.4 (H) 11.5 - 14.5 %    Platelets 255 150 - 450 K/uL    MPV 10.1 9.2 - 12.9 fL    Immature Granulocytes 0.2 0.0 - 0.5 %    Gran # (ANC) 6.6 1.8 - 7.7 K/uL    Immature Grans (Abs) 0.02 0.00 - 0.04 K/uL    Lymph # 3.4 1.0 - 4.8 K/uL    Mono # 0.8 0.3 - 1.0 K/uL    Eos # 0.1 0.0 - 0.5 K/uL    Baso # 0.03 0.00 - 0.20 K/uL    nRBC 0 0 /100 WBC    Gran % 60.3 38.0 - 73.0 %    Lymph % 31.1 18.0 - 48.0 %    Mono % 7.4 4.0 - 15.0 %    Eosinophil % 0.7 0.0 - 8.0 %    Basophil % 0.3 0.0 - 1.9 %    Differential Method Automated    Comprehensive Metabolic Panel    Collection Time: 02/02/23  1:15 PM   Result Value Ref Range    Sodium 138 136 - 145 mmol/L    Potassium 3.8 3.5 - 5.1 mmol/L    Chloride 108 95 - 110 mmol/L    CO2 20 (L) 23 - 29 mmol/L    Glucose 117 (H) 70 - 110 mg/dL    BUN 14 6 - 20 mg/dL    Creatinine 1.3 0.5 - 1.4 mg/dL    Calcium 9.4 8.7 - 10.5 mg/dL    Total Protein 8.0 6.0 - 8.4 g/dL    Albumin 3.9 3.5 - 5.2 g/dL    Total Bilirubin 0.4 0.1 - 1.0 mg/dL    Alkaline Phosphatase 99 55 - 135 U/L    AST 47 (H) 10 - 40 U/L    ALT 29 10 - 44 U/L    Anion Gap 10 8 - 16 mmol/L    eGFR >60.0 >60 mL/min/1.73 m^2   C-Reactive Protein    Collection Time: 02/02/23  1:15 PM   Result Value Ref Range    CRP 1.6 0.0 - 8.2 mg/L   Lactate Dehydrogenase    Collection Time: 02/02/23  1:15 PM   Result Value Ref Range     (H) 110 - 260 U/L   Lipid Panel    Collection Time: 02/02/23  1:15 PM   Result Value Ref Range    Cholesterol 171 120 - 199 mg/dL    Triglycerides 342 (H) 30 - 150 mg/dL    HDL 35 (L) 40 - 75 mg/dL    LDL Cholesterol 67.6 63.0 - 159.0 mg/dL    HDL/Cholesterol Ratio 20.5 20.0 - 50.0 %    Total  Cholesterol/HDL Ratio 4.9 2.0 - 5.0    Non-HDL Cholesterol 136 mg/dL   Magnesium    Collection Time: 02/02/23  1:15 PM   Result Value Ref Range    Magnesium 1.8 1.6 - 2.6 mg/dL   Phosphorus    Collection Time: 02/02/23  1:15 PM   Result Value Ref Range    Phosphorus 2.0 (L) 2.7 - 4.5 mg/dL   Prealbumin    Collection Time: 02/02/23  1:15 PM   Result Value Ref Range    Prealbumin 24 20 - 43 mg/dL   Protime-INR    Collection Time: 02/02/23  1:15 PM   Result Value Ref Range    Prothrombin Time 29.3 (H) 9.0 - 12.5 sec    INR 3.0 (H) 0.8 - 1.2         Follow Up:  Follow up in about 4 months (around 6/7/2023) for Joint pain, DM, HLD.               This note was created with the assistance of a voice recognition software or phone dictation. There may be transcription errors as a result of using this technology however minimal. Effort has been made to assure accuracy of transcription but any obvious errors or omissions should be clarified with the author of the document

## 2023-02-08 ENCOUNTER — ANTI-COAG VISIT (OUTPATIENT)
Dept: CARDIOLOGY | Facility: CLINIC | Age: 38
End: 2023-02-08
Payer: MEDICAID

## 2023-02-08 DIAGNOSIS — Z95.811 LVAD (LEFT VENTRICULAR ASSIST DEVICE) PRESENT: Primary | ICD-10-CM

## 2023-02-08 LAB — INR PPP: 1.5

## 2023-02-08 PROCEDURE — 93793 ANTICOAG MGMT PT WARFARIN: CPT | Mod: ,,,

## 2023-02-08 PROCEDURE — 93793 PR ANTICOAGULANT MGMT FOR PT TAKING WARFARIN: ICD-10-PCS | Mod: ,,,

## 2023-02-08 NOTE — PROGRESS NOTES
Unable to reach patient - Spoke with patient wife Darlyn Wright regarding recent INR results .  Questioned and unable to verify dosage . Reported dark leafy greens intake last week but denies any missed doses , new meds , or etc.

## 2023-02-09 NOTE — PROGRESS NOTES
INR not at goal. Medications, chart, and patient findings reviewed. See calendar for adjustments to dose and follow up plan.       
Patient mother questioned and confirmed dose - recent walnuts/almonds intake 8/14.  No other changes reported.  
minimum assist (75% patients effort)

## 2023-02-14 LAB
EXT ALBUMIN: 4.6
EXT ALT: 23
EXT AST: 34
EXT BILIRUBIN TOTAL: 0.8
EXT BNP: 376
EXT BUN: 10
EXT CALCIUM: 9.4
EXT CHLORIDE: 102
EXT CREATININE: 1.33 MG/DL
EXT GLUCOSE: 87
EXT HEMATOCRIT: 36.1
EXT HEMOGLOBIN: 12.1
EXT MAGNESIUM: 1.9
EXT NT PROBNP: 1231
EXT PLATELETS: 317
EXT POTASSIUM: 3.9
EXT PROTEIN TOTAL: 7.9
EXT SODIUM: 139 MMOL/L
EXT WBC: 10.4

## 2023-02-15 ENCOUNTER — ANTI-COAG VISIT (OUTPATIENT)
Dept: CARDIOLOGY | Facility: CLINIC | Age: 38
End: 2023-02-15
Payer: MEDICAID

## 2023-02-15 DIAGNOSIS — Z95.811 LVAD (LEFT VENTRICULAR ASSIST DEVICE) PRESENT: Primary | ICD-10-CM

## 2023-02-15 LAB — INR PPP: 1.7

## 2023-02-15 PROCEDURE — 93793 ANTICOAG MGMT PT WARFARIN: CPT | Mod: ,,,

## 2023-02-15 PROCEDURE — 93793 PR ANTICOAGULANT MGMT FOR PT TAKING WARFARIN: ICD-10-PCS | Mod: ,,,

## 2023-02-15 NOTE — PROGRESS NOTES
Unable to speak to pt, signifcant other states that pt is taking all correct doses. Denies any missed or extra doses. She did state pt started taking Norco HYDROcodone-acetaminophen (NORCO) 5-325 mg per tablet on 2/7 she was unsure if coumadin clinic was aware of the medication. Denies any other changes.

## 2023-02-17 ENCOUNTER — TELEPHONE (OUTPATIENT)
Dept: TRANSPLANT | Facility: CLINIC | Age: 38
End: 2023-02-17
Payer: MEDICAID

## 2023-02-17 ENCOUNTER — DOCUMENTATION ONLY (OUTPATIENT)
Dept: TRANSPLANT | Facility: CLINIC | Age: 38
End: 2023-02-17
Payer: MEDICAID

## 2023-02-23 LAB — INR PPP: 2.3

## 2023-02-24 ENCOUNTER — TELEPHONE (OUTPATIENT)
Dept: TRANSPLANT | Facility: CLINIC | Age: 38
End: 2023-02-24
Payer: MEDICAID

## 2023-02-24 ENCOUNTER — ANTI-COAG VISIT (OUTPATIENT)
Dept: CARDIOLOGY | Facility: CLINIC | Age: 38
End: 2023-02-24
Payer: MEDICAID

## 2023-02-24 DIAGNOSIS — Z95.811 LVAD (LEFT VENTRICULAR ASSIST DEVICE) PRESENT: Primary | ICD-10-CM

## 2023-02-24 PROCEDURE — 93793 ANTICOAG MGMT PT WARFARIN: CPT | Mod: ,,,

## 2023-02-24 PROCEDURE — 93793 PR ANTICOAGULANT MGMT FOR PT TAKING WARFARIN: ICD-10-PCS | Mod: ,,,

## 2023-02-24 NOTE — TELEPHONE ENCOUNTER
Contacted [facility] to obtain verification as to why the other results were not available   Spoke with Digna and she could not provide a reason as to why patient weekly labs were not drawn.     VAD coordinator made aware.

## 2023-02-24 NOTE — TELEPHONE ENCOUNTER
Contacted   Every monday labs  [facility] ph [phone] f- [phone]  To obtain weekly lab results     Provided fax number     Awaiting results.

## 2023-02-24 NOTE — TELEPHONE ENCOUNTER
Received call back from Digna whom stated after investigation the orders they received stated to draw labs every other week     This information relayed to VAD coordinator.

## 2023-02-28 LAB — INR PPP: 2.3

## 2023-03-01 ENCOUNTER — ANTI-COAG VISIT (OUTPATIENT)
Dept: CARDIOLOGY | Facility: CLINIC | Age: 38
End: 2023-03-01
Payer: MEDICAID

## 2023-03-01 DIAGNOSIS — Z95.811 LVAD (LEFT VENTRICULAR ASSIST DEVICE) PRESENT: Primary | ICD-10-CM

## 2023-03-01 PROCEDURE — 93793 ANTICOAG MGMT PT WARFARIN: CPT | Mod: ,,,

## 2023-03-01 PROCEDURE — 93793 PR ANTICOAGULANT MGMT FOR PT TAKING WARFARIN: ICD-10-PCS | Mod: ,,,

## 2023-03-02 ENCOUNTER — DOCUMENTATION ONLY (OUTPATIENT)
Dept: TRANSPLANT | Facility: CLINIC | Age: 38
End: 2023-03-02
Payer: MEDICAID

## 2023-03-02 LAB
EXT ALBUMIN: 4.3
EXT ALT: 17
EXT AST: 30
EXT BILIRUBIN TOTAL: 1.2
EXT BNP: 255
EXT BUN: 10
EXT CALCIUM: 9.5
EXT CHLORIDE: 100
EXT CREATININE: 1.43 MG/DL
EXT GLUCOSE: 144
EXT HEMATOCRIT: 39.3
EXT HEMOGLOBIN: 12.6
EXT MAGNESIUM: 1.8
EXT PLATELETS: 282
EXT POTASSIUM: 4
EXT PROTEIN TOTAL: 7.9
EXT SODIUM: 139 MMOL/L
EXT WBC: 8.4

## 2023-03-07 ENCOUNTER — TELEPHONE (OUTPATIENT)
Dept: TRANSPLANT | Facility: CLINIC | Age: 38
End: 2023-03-07
Payer: MEDICAID

## 2023-03-07 DIAGNOSIS — M25.50 ARTHRALGIA, UNSPECIFIED JOINT: ICD-10-CM

## 2023-03-07 LAB — INR PPP: 1.6

## 2023-03-07 RX ORDER — HYDROCODONE BITARTRATE AND ACETAMINOPHEN 5; 325 MG/1; MG/1
1 TABLET ORAL EVERY 8 HOURS PRN
Qty: 30 TABLET | Refills: 0 | Status: SHIPPED | OUTPATIENT
Start: 2023-03-07 | End: 2023-06-12

## 2023-03-07 NOTE — TELEPHONE ENCOUNTER
Spoke with patient regarding equipment maintenance due at upcoming clinic appointment.  Asked patient/caregiver to bring MPU with them to next appointment for maintenance.  Verbalized understanding and agreement.    It is medically necessary to ensure patient has properly functioning equipment and wearables to prevent infection, injury or death to patient.

## 2023-03-08 ENCOUNTER — HOSPITAL ENCOUNTER (EMERGENCY)
Facility: HOSPITAL | Age: 38
Discharge: HOME | End: 2023-03-08
Attending: EMERGENCY MEDICINE
Payer: MEDICAID

## 2023-03-08 ENCOUNTER — ANTI-COAG VISIT (OUTPATIENT)
Dept: CARDIOLOGY | Facility: CLINIC | Age: 38
End: 2023-03-08
Payer: MEDICAID

## 2023-03-08 VITALS
OXYGEN SATURATION: 96 % | BODY MASS INDEX: 28.1 KG/M2 | WEIGHT: 226 LBS | DIASTOLIC BLOOD PRESSURE: 26 MMHG | HEART RATE: 102 BPM | RESPIRATION RATE: 17 BRPM | HEIGHT: 75 IN | TEMPERATURE: 99 F | SYSTOLIC BLOOD PRESSURE: 93 MMHG

## 2023-03-08 DIAGNOSIS — Z95.811 LVAD (LEFT VENTRICULAR ASSIST DEVICE) PRESENT: Primary | ICD-10-CM

## 2023-03-08 DIAGNOSIS — T82.524A DISPLACEMENT OF PERIPHERALLY INSERTED CENTRAL CATHETER (PICC): Primary | ICD-10-CM

## 2023-03-08 DIAGNOSIS — Z95.811 LVAD (LEFT VENTRICULAR ASSIST DEVICE) PRESENT: ICD-10-CM

## 2023-03-08 LAB
INR BLD: 1.63 (ref 0–1.3)
PROTHROMBIN TIME: 19.1 SECONDS (ref 12.5–14.5)

## 2023-03-08 PROCEDURE — 85610 PROTHROMBIN TIME: CPT | Performed by: EMERGENCY MEDICINE

## 2023-03-08 PROCEDURE — 93793 PR ANTICOAGULANT MGMT FOR PT TAKING WARFARIN: ICD-10-PCS | Mod: ,,,

## 2023-03-08 PROCEDURE — C1751 CATH, INF, PER/CENT/MIDLINE: HCPCS

## 2023-03-08 PROCEDURE — 36569 INSJ PICC 5 YR+ W/O IMAGING: CPT

## 2023-03-08 PROCEDURE — 99284 EMERGENCY DEPT VISIT MOD MDM: CPT | Mod: 27

## 2023-03-08 PROCEDURE — 93793 ANTICOAG MGMT PT WARFARIN: CPT | Mod: ,,,

## 2023-03-08 RX ORDER — SODIUM CHLORIDE 0.9 % (FLUSH) 0.9 %
10 SYRINGE (ML) INJECTION EVERY 6 HOURS
Status: DISCONTINUED | OUTPATIENT
Start: 2023-03-08 | End: 2023-03-08 | Stop reason: HOSPADM

## 2023-03-08 RX ORDER — SODIUM CHLORIDE 0.9 % (FLUSH) 0.9 %
10 SYRINGE (ML) INJECTION
Status: DISCONTINUED | OUTPATIENT
Start: 2023-03-08 | End: 2023-03-08 | Stop reason: HOSPADM

## 2023-03-08 NOTE — ED PROVIDER NOTES
"Encounter Date: 3/8/2023    SCRIBE #1 NOTE: I, Luis Javed, am scribing for, and in the presence of,  Elba Dominguez DO. I have scribed the following portions of the note - Other sections scribed: HPI, ROS, PE.     History     Chief Complaint   Patient presents with    PICC LINE REINSERTION     STATES PICC LINE CAME OUT "TOO FAR". HERE TO GET PICC LINE PUT BACK IN. PT HAS AN LVAD.      37 year old male with PMH of cardiomyopathy s/p LVAD, DM, drug abuse, and renal disorder presents to ED c/o PICC line issue onset earlier tonight. Pt states that he noticed that his PICC line came out to far when taking off his clothes to go to bed. Pt reports that blood was drawn from the line yesterday and it was flushing correctly. Pt denies pain from PICC line. He has been PICC for continuous Milrinone infusion     The history is provided by the patient. No  was used.   Illness   Illness onset: earlier tonight. The problem occurs continuously. The problem has been unchanged. Nothing relieves the symptoms. Nothing aggravates the symptoms. Pertinent negatives include no fever.   Review of patient's allergies indicates:   Allergen Reactions    Aspirin Other (See Comments)     Mr. Thacker is enrolled in Dr. Paige's Alon Trial and cannot have any aspirin and/or aspirin-containing products. DO NOT cancel any orders for INV Aspirin 100 mg/Placebo. If you have questions, please contact Isabel @ [phone], [phone],[email or web address], secure chat or MS Teams message.    Bumex [bumetanide] Hives    Lactose Diarrhea     Other reaction(s): Abdominal distension, gaseous    Torsemide Hives     Past Medical History:   Diagnosis Date    Arthritis     Cardiomyopathy     CHF (congestive heart failure) 10/01/2020    Diabetes mellitus     Dilated cardiomyopathy 10/26/2020    Drug abuse 10/2020    Hyperosmolar hyperglycemic state (HHS) 5/25/2022    ICD (implantable cardioverter-defibrillator) in place 10/26/2020    " Muscle cramping 6/15/2022    Renal disorder      Past Surgical History:   Procedure Laterality Date    APPLICATION OF WOUND VACUUM-ASSISTED CLOSURE DEVICE N/A 6/30/2022    Procedure: APPLICATION, WOUND VAC;  Surgeon: Luis F Paige MD;  [facility];  Service: Cardiovascular;  Laterality: N/A;  50 x 5 cm    CARDIAC DEFIBRILLATOR PLACEMENT      IMPLANTATION OF RIGHT VENTRICULAR ASSIST DEVICE (RVAD) N/A 6/29/2022    Procedure: INSERTION, RVAD;  Surgeon: Luis F Paige MD;  [facility];  Service: Cardiovascular;  Laterality: N/A;    INSERTION OF GRAFT TO PERICARDIUM Right 6/30/2022    Procedure: INSERTION-RIGHT VENTRICULAR ASSIST DEVICE;  Surgeon: Luis F Paige MD;  [facility];  Service: Cardiovascular;  Laterality: Right;    IRRIGATION OF MEDIASTINUM  6/30/2022    Procedure: IRRIGATION, MEDIASTINUM;  Surgeon: Luis F Paige MD;  [facility];  Service: Cardiovascular;;    LEFT VENTRICULAR ASSIST DEVICE Left 6/23/2022    Procedure: INSERTION-LEFT VENTRICULAR ASSIST DEVICE;  Surgeon: Luis F Paige MD;  [facility];  Service: Cardiovascular;  Laterality: Left;    LEFT VENTRICULAR ASSIST DEVICE N/A 6/29/2022    Procedure: INSERTION-LEFT VENTRICULAR ASSIST DEVICE;  Surgeon: Luis F Paige MD;  [facility];  Service: Cardiovascular;  Laterality: N/A;    RIGHT HEART CATHETERIZATION Right 4/8/2022    Procedure: INSERTION, CATHETER, RIGHT HEART;  Surgeon: Luca Lopez Jr., MD;  Location: [facility] CATH LAB;  Service: Cardiology;  Laterality: Right;    RIGHT HEART CATHETERIZATION Right 4/19/2022    Procedure: INSERTION, CATHETER, RIGHT HEART;  Surgeon: Josh Pulido MD;  Location: [facility] CATH LAB;  Service: Cardiology;  Laterality: Right;    RIGHT HEART CATHETERIZATION Right 7/21/2022    Procedure: INSERTION, CATHETER, RIGHT HEART;  Surgeon: Dalia Crum MD;  Location: [facility] CATH LAB;  Service: Cardiology;  Laterality: Right;     RIGHT HEART CATHETERIZATION Right 10/31/2022    Procedure: INSERTION, CATHETER, RIGHT HEART;  Surgeon: Dalia Crum MD;  Location: [facility] CATH LAB;  Service: Cardiology;  Laterality: Right;    STERNAL WOUND CLOSURE N/A 2022    Procedure: CLOSURE, WOUND, STERNUM;  Surgeon: Luis F Paige MD;  [facility];  Service: Cardiovascular;  Laterality: N/A;     Family History   Problem Relation Age of Onset    Heart failure Father     Diverticulosis Brother     Heart attack Maternal Grandmother     Heart attack Maternal Grandfather      Social History     Tobacco Use    Smoking status: Former     Packs/day: 0.50     Years: 16.00     Pack years: 8.00     Types: Cigarettes     Start date: 10/1/2004     Quit date: 2021     Years since quittin.8    Smokeless tobacco: Never   Substance Use Topics    Alcohol use: Not Currently    Drug use: Not Currently     Types: Marijuana, MDMA (Ecstacy)     Review of Systems   Constitutional:  Negative for chills and fever.     Physical Exam     Initial Vitals [23 0218]   BP Pulse Resp Temp SpO2   (!) 93/26 102 17 98.5 °F (36.9 °C) 96 %      MAP       --         Physical Exam    Nursing note and vitals reviewed.  Constitutional: He appears well-developed and well-nourished. He is not diaphoretic. No distress.   HENT:   Head: Normocephalic and atraumatic.   Right Ear: External ear normal.   Left Ear: External ear normal.   Eyes: Conjunctivae are normal.   Neck: Neck supple.   Normal range of motion.  Cardiovascular:  Normal rate.           LVAD murmur    Pulmonary/Chest: No respiratory distress.   Abdominal: He exhibits no distension.   Musculoskeletal:         General: Normal range of motion.      Cervical back: Normal range of motion and neck supple.      Comments: PICC to right arm, able to palpate distal tip just underneath skin      Neurological: He is alert and oriented to person, place, and time. He has normal strength. GCS score is 15. GCS eye subscore  is 4. GCS verbal subscore is 5. GCS motor subscore is 6.   Skin: Skin is warm and dry.   Psychiatric: He has a normal mood and affect.       ED Course   Procedures  Labs Reviewed   PROTIME-INR - Abnormal; Notable for the following components:       Result Value    PT 19.1 (*)     INR 1.63 (*)     All other components within normal limits          Imaging Results              X-Ray Chest 1 View for Line/Tube Placement (Final result)  Result time 03/08/23 06:26:17      Final result by Josh Tucker MD (03/08/23 06:26:17)                   Impression:      Right PICC tip overlies mid SVC.      Electronically signed by: Josh Tucker  Date:    03/08/2023  Time:    06:26               Narrative:    EXAMINATION:  XR CHEST 1 VIEW FOR LINE/TUBE PLACEMENT    CLINICAL HISTORY:  picc;    COMPARISON:  15 December 2022    FINDINGS:  Portable frontal view of the chest was obtained. Right PICC tip overlies the mid SVC.  Stable cardiomegaly with LVAD and AICD noted.  Lungs are grossly clear.  No pneumothorax.                                       Medications   sodium chloride 0.9% flush 10 mL (has no administration in time range)     And   sodium chloride 0.9% flush 10 mL (has no administration in time range)         Medical Decision Making  Displaced PICC Line, LVAD patient on Milrinone   Confirmed with LVAD coordinator that we can place PICC here  Discharged after PICC placement     Amount and/or Complexity of Data Reviewed  Radiology: ordered and independent interpretation performed.     Details: XR: PICC in place    Risk  Prescription drug management.          Scribe Attestation:   Scribe #1: I performed the above scribed service and the documentation accurately describes the services I performed. I attest to the accuracy of the note.    Attending Attestation:           Physician Attestation for Scribe:  Physician Attestation Statement for Scribe #1: I, Elba Dominguez, DO, reviewed documentation, as scribed by Luis  Tegan in my presence, and it is both accurate and complete.           ED Course as of 03/08/23 0653   Wed Mar 08, 2023   0317 Spoke with Shena Turpin, LVAD coordinator who recommends INR check and have PICC team place new line and d/c  [KM]   0458 PICC RN should arrive around [id number] for placement [KM]   0620 PICC inserted and placement confirmed with XR. Patient restarting his Milrinone drip and will be discharged home  [KM]      ED Course User Index  [KM] Elba Dominguez MD                   Clinical Impression:   Final diagnoses:  [T82.524A] Displacement of peripherally inserted central catheter (PICC) (Primary)  [Z95.811] LVAD (left ventricular assist device) present        ED Disposition Condition    Discharge Stable          ED Prescriptions    None       Follow-up Information       Follow up With Specialties Details Why Contact Info    [facility] Emergency [facility]  As needed, If symptoms worsen [address] [facility] [address]  [phone]             Elba Dominguez MD  03/08/23 0613

## 2023-03-08 NOTE — PROGRESS NOTES
Spoke with patient wife regarding recent INR results .  Patient questioned and verified correct dosage . Reported having PICC LINE REINSERTION 3/8 and eating smaller portions . Denies any missed doses or etc.

## 2023-03-08 NOTE — PROCEDURES
"Kevan Queen is a 37 y.o. male patient.    Temp: 98.5 °F (36.9 °C) (03/08/23 0218)  Pulse: 102 (03/08/23 0218)  Resp: 17 (03/08/23 0218)  BP: (!) 93/26 (03/08/23 0218)  SpO2: 96 % (03/08/23 0218)  Weight: 102.5 kg (226 lb) (03/08/23 0218)  Height: 6' 3" (190.5 cm) (03/08/23 0218)    PICC  Date/Time: 3/8/2023 5:55 AM  Performed by: Marlen Haider RN  Consent Done: Yes  Time out: Immediately prior to procedure a time out was called to verify the correct patient, procedure, equipment, support staff and site/side marked as required  Indications: vascular access  Preparation: skin prepped with ChloraPrep  Skin prep agent dried: skin prep agent completely dried prior to procedure  Sterile barriers: all five maximum sterile barriers used - cap, mask, sterile gown, sterile gloves, and large sterile sheet  Hand hygiene: hand hygiene performed prior to central venous catheter insertion  Location details: right brachial  Catheter type: double lumen  Catheter size: 5 Fr  Catheter Length: 38cm    noNumber of attempts: 1  Post-procedure: blood return through all ports, sterile dressing applied and chlorhexidine patch    Assessment: placement verified by x-ray  Complications: none  Comments: Arm circumference 39 cm. Right upper arm picc noted to be 10cm out. Pic exchange done at this time per protocol. Site presents with no swelling redness or drainage. Old picc removed easily measuring 38cm. No complications.         Name Marlen Haider RN  3/8/2023    "

## 2023-03-09 ENCOUNTER — TELEPHONE (OUTPATIENT)
Dept: TRANSPLANT | Facility: CLINIC | Age: 38
End: 2023-03-09
Payer: MEDICAID

## 2023-03-09 NOTE — TELEPHONE ENCOUNTER
Received page from ER MD that patient presented to the ER after rolling onto his PICC line and pulling it. After examination, patient needs a new PICC line. I gave the OK for PICC to be replaced in the ER. Advised to check an INR prior to.

## 2023-03-13 DIAGNOSIS — Z00.6 EXAMINATION OF PARTICIPANT IN CLINICAL TRIAL: ICD-10-CM

## 2023-03-13 DIAGNOSIS — Z95.811 LVAD (LEFT VENTRICULAR ASSIST DEVICE) PRESENT: Primary | ICD-10-CM

## 2023-03-14 LAB
EXT ALBUMIN: 4.4
EXT ALT: 13
EXT AST: 22
EXT BILIRUBIN TOTAL: 1.1
EXT BUN: 12
EXT CALCIUM: 9.6
EXT CHLORIDE: 101
EXT CREATININE: 1.36 MG/DL
EXT GLUCOSE: 198
EXT HEMATOCRIT: 36.6
EXT HEMOGLOBIN: 11.8
EXT MAGNESIUM: 2
EXT NT PROBNP: 658
EXT PLATELETS: 322
EXT POTASSIUM: 5
EXT PROTEIN TOTAL: 7.6
EXT SODIUM: 136 MMOL/L
EXT WBC: 9.1
INR PPP: 1.9

## 2023-03-15 ENCOUNTER — ANTI-COAG VISIT (OUTPATIENT)
Dept: CARDIOLOGY | Facility: CLINIC | Age: 38
End: 2023-03-15
Payer: MEDICAID

## 2023-03-15 ENCOUNTER — TELEPHONE (OUTPATIENT)
Dept: TRANSPLANT | Facility: CLINIC | Age: 38
End: 2023-03-15
Payer: MEDICAID

## 2023-03-15 ENCOUNTER — RESEARCH ENCOUNTER (OUTPATIENT)
Dept: RESEARCH | Facility: HOSPITAL | Age: 38
End: 2023-03-15
Payer: MEDICAID

## 2023-03-15 DIAGNOSIS — Z95.811 LVAD (LEFT VENTRICULAR ASSIST DEVICE) PRESENT: Primary | ICD-10-CM

## 2023-03-15 PROCEDURE — 93793 ANTICOAG MGMT PT WARFARIN: CPT | Mod: ,,,

## 2023-03-15 PROCEDURE — 93793 PR ANTICOAGULANT MGMT FOR PT TAKING WARFARIN: ICD-10-PCS | Mod: ,,,

## 2023-03-15 NOTE — PROGRESS NOTES
Study Title:  Antiplatelet Removal and HemocompatIbility EventS with the HeartMate 3 Pump   Protocol: CRD_971  IRB #/Approval Date: [id number]  Sponsor: [employer]  : Dr. Luis F Paige     Left voice mail for Mr. Queen reminding him of Alon Trial appointment tomorrow, Thursday March 16, 2023

## 2023-03-15 NOTE — TELEPHONE ENCOUNTER
Patient labs reviewed, no changes made at this time, K noted to be at 5 will recheck next week, will continue to monitor

## 2023-03-16 ENCOUNTER — TELEPHONE (OUTPATIENT)
Dept: TRANSPLANT | Facility: CLINIC | Age: 38
End: 2023-03-16
Payer: MEDICAID

## 2023-03-16 NOTE — TELEPHONE ENCOUNTER
Sandro called to reschedule appt today.  She is having car trouble.  She left a message for Leni.  I explained Leni is in clinic today and will call her back later today to reschedule once she gets the message.  Page verbalized understanding and agreement.

## 2023-03-17 ENCOUNTER — TELEPHONE (OUTPATIENT)
Dept: FAMILY MEDICINE | Facility: CLINIC | Age: 38
End: 2023-03-17
Payer: MEDICAID

## 2023-03-17 NOTE — TELEPHONE ENCOUNTER
Cecile with Palliative care states that they are not able to give patient medication for chronic pain.

## 2023-03-20 NOTE — TELEPHONE ENCOUNTER
Spoke to Ms Kyle, made aware of message, she states she will call insurance company and let us know who to send referral to.

## 2023-03-22 NOTE — PROGRESS NOTES
Per Ms. Kyle, pt did not go to lab on 3/21 because he is going to VAD clinic tomorrow to have labs.

## 2023-03-23 ENCOUNTER — ANTI-COAG VISIT (OUTPATIENT)
Dept: CARDIOLOGY | Facility: CLINIC | Age: 38
End: 2023-03-23
Payer: MEDICAID

## 2023-03-23 ENCOUNTER — CLINICAL SUPPORT (OUTPATIENT)
Dept: TRANSPLANT | Facility: CLINIC | Age: 38
End: 2023-03-23
Payer: MEDICAID

## 2023-03-23 ENCOUNTER — LAB VISIT (OUTPATIENT)
Dept: LAB | Facility: HOSPITAL | Age: 38
End: 2023-03-23
Attending: INTERNAL MEDICINE
Payer: MEDICAID

## 2023-03-23 ENCOUNTER — OFFICE VISIT (OUTPATIENT)
Dept: TRANSPLANT | Facility: CLINIC | Age: 38
End: 2023-03-23
Payer: MEDICAID

## 2023-03-23 ENCOUNTER — HOSPITAL ENCOUNTER (OUTPATIENT)
Dept: CARDIOLOGY | Facility: CLINIC | Age: 38
Discharge: HOME | End: 2023-03-23
Payer: MEDICAID

## 2023-03-23 ENCOUNTER — RESEARCH ENCOUNTER (OUTPATIENT)
Dept: RESEARCH | Facility: HOSPITAL | Age: 38
End: 2023-03-23

## 2023-03-23 VITALS — BODY MASS INDEX: 28.5 KG/M2 | SYSTOLIC BLOOD PRESSURE: 90 MMHG | TEMPERATURE: 98 F | HEIGHT: 75 IN | WEIGHT: 229.19 LBS

## 2023-03-23 DIAGNOSIS — I42.8 NICM (NONISCHEMIC CARDIOMYOPATHY): ICD-10-CM

## 2023-03-23 DIAGNOSIS — I50.814 RIGHT HEART FAILURE SECONDARY TO LEFT HEART FAILURE: ICD-10-CM

## 2023-03-23 DIAGNOSIS — Z79.4 TYPE 2 DIABETES MELLITUS WITH HYPERGLYCEMIA, WITH LONG-TERM CURRENT USE OF INSULIN: ICD-10-CM

## 2023-03-23 DIAGNOSIS — Z95.811 LVAD (LEFT VENTRICULAR ASSIST DEVICE) PRESENT: Primary | ICD-10-CM

## 2023-03-23 DIAGNOSIS — Z00.6 EXAMINATION OF PARTICIPANT IN CLINICAL TRIAL: Primary | ICD-10-CM

## 2023-03-23 DIAGNOSIS — Z95.811 LVAD (LEFT VENTRICULAR ASSIST DEVICE) PRESENT: ICD-10-CM

## 2023-03-23 DIAGNOSIS — E11.65 TYPE 2 DIABETES MELLITUS WITH HYPERGLYCEMIA, WITH LONG-TERM CURRENT USE OF INSULIN: ICD-10-CM

## 2023-03-23 DIAGNOSIS — Z00.6 EXAMINATION OF PARTICIPANT IN CLINICAL TRIAL: ICD-10-CM

## 2023-03-23 DIAGNOSIS — I50.42 CHRONIC COMBINED SYSTOLIC AND DIASTOLIC HEART FAILURE: ICD-10-CM

## 2023-03-23 DIAGNOSIS — Z95.810 ICD (IMPLANTABLE CARDIOVERTER-DEFIBRILLATOR) IN PLACE: ICD-10-CM

## 2023-03-23 LAB
ALBUMIN SERPL BCP-MCNC: 4.2 G/DL (ref 3.5–5.2)
ALP SERPL-CCNC: 122 U/L (ref 55–135)
ALT SERPL W/O P-5'-P-CCNC: 15 U/L (ref 10–44)
ANION GAP SERPL CALC-SCNC: 10 MMOL/L (ref 8–16)
AST SERPL-CCNC: 22 U/L (ref 10–40)
BASOPHILS # BLD AUTO: 0.04 K/UL (ref 0–0.2)
BASOPHILS NFR BLD: 0.4 % (ref 0–1.9)
BILIRUB DIRECT SERPL-MCNC: 0.6 MG/DL (ref 0.1–0.3)
BILIRUB SERPL-MCNC: 1.3 MG/DL (ref 0.1–1)
BNP SERPL-MCNC: 682 PG/ML (ref 0–99)
BUN SERPL-MCNC: 12 MG/DL (ref 6–20)
CALCIUM SERPL-MCNC: 10.3 MG/DL (ref 8.7–10.5)
CHLORIDE SERPL-SCNC: 104 MMOL/L (ref 95–110)
CO2 SERPL-SCNC: 25 MMOL/L (ref 23–29)
CREAT SERPL-MCNC: 1.5 MG/DL (ref 0.5–1.4)
CRP SERPL-MCNC: 7.1 MG/L (ref 0–8.2)
DIFFERENTIAL METHOD: ABNORMAL
EOSINOPHIL # BLD AUTO: 0.1 K/UL (ref 0–0.5)
EOSINOPHIL NFR BLD: 0.8 % (ref 0–8)
ERYTHROCYTE [DISTWIDTH] IN BLOOD BY AUTOMATED COUNT: 16.2 % (ref 11.5–14.5)
EST. GFR  (NO RACE VARIABLE): >60 ML/MIN/1.73 M^2
GLUCOSE SERPL-MCNC: 140 MG/DL (ref 70–110)
HCT VFR BLD AUTO: 38.8 % (ref 40–54)
HGB BLD-MCNC: 12.4 G/DL (ref 14–18)
IMM GRANULOCYTES # BLD AUTO: 0.02 K/UL (ref 0–0.04)
IMM GRANULOCYTES NFR BLD AUTO: 0.2 % (ref 0–0.5)
INR PPP: 2.1 (ref 0.8–1.2)
LDH SERPL L TO P-CCNC: 303 U/L (ref 110–260)
LYMPHOCYTES # BLD AUTO: 2.3 K/UL (ref 1–4.8)
LYMPHOCYTES NFR BLD: 25.6 % (ref 18–48)
MAGNESIUM SERPL-MCNC: 1.7 MG/DL (ref 1.6–2.6)
MCH RBC QN AUTO: 26.3 PG (ref 27–31)
MCHC RBC AUTO-ENTMCNC: 32 G/DL (ref 32–36)
MCV RBC AUTO: 82 FL (ref 82–98)
MONOCYTES # BLD AUTO: 0.9 K/UL (ref 0.3–1)
MONOCYTES NFR BLD: 9.6 % (ref 4–15)
NEUTROPHILS # BLD AUTO: 5.8 K/UL (ref 1.8–7.7)
NEUTROPHILS NFR BLD: 63.4 % (ref 38–73)
NRBC BLD-RTO: 0 /100 WBC
PHOSPHATE SERPL-MCNC: 3.4 MG/DL (ref 2.7–4.5)
PLATELET # BLD AUTO: 275 K/UL (ref 150–450)
PMV BLD AUTO: 9.2 FL (ref 9.2–12.9)
POTASSIUM SERPL-SCNC: 4.2 MMOL/L (ref 3.5–5.1)
PREALB SERPL-MCNC: 21 MG/DL (ref 20–43)
PROT SERPL-MCNC: 8.8 G/DL (ref 6–8.4)
PROTHROMBIN TIME: 21.3 SEC (ref 9–12.5)
RBC # BLD AUTO: 4.72 M/UL (ref 4.6–6.2)
SODIUM SERPL-SCNC: 139 MMOL/L (ref 136–145)
WBC # BLD AUTO: 9.1 K/UL (ref 3.9–12.7)

## 2023-03-23 PROCEDURE — 85025 COMPLETE CBC W/AUTO DIFF WBC: CPT | Performed by: INTERNAL MEDICINE

## 2023-03-23 PROCEDURE — 80053 COMPREHEN METABOLIC PANEL: CPT | Performed by: INTERNAL MEDICINE

## 2023-03-23 PROCEDURE — 82248 BILIRUBIN DIRECT: CPT | Performed by: INTERNAL MEDICINE

## 2023-03-23 PROCEDURE — 93750 INTERROGATION VAD IN PERSON: CPT | Mod: S$PBB,,, | Performed by: INTERNAL MEDICINE

## 2023-03-23 PROCEDURE — 83615 LACTATE (LD) (LDH) ENZYME: CPT | Performed by: INTERNAL MEDICINE

## 2023-03-23 PROCEDURE — 3052F HG A1C>EQUAL 8.0%<EQUAL 9.0%: CPT | Mod: CPTII,,, | Performed by: INTERNAL MEDICINE

## 2023-03-23 PROCEDURE — 3008F BODY MASS INDEX DOCD: CPT | Mod: CPTII,,, | Performed by: INTERNAL MEDICINE

## 2023-03-23 PROCEDURE — 99215 PR OFFICE/OUTPT VISIT, EST, LEVL V, 40-54 MIN: ICD-10-PCS | Mod: S$PBB,,, | Performed by: INTERNAL MEDICINE

## 2023-03-23 PROCEDURE — 84134 ASSAY OF PREALBUMIN: CPT | Performed by: INTERNAL MEDICINE

## 2023-03-23 PROCEDURE — 85610 PROTHROMBIN TIME: CPT | Performed by: INTERNAL MEDICINE

## 2023-03-23 PROCEDURE — 83735 ASSAY OF MAGNESIUM: CPT | Performed by: INTERNAL MEDICINE

## 2023-03-23 PROCEDURE — 3052F PR MOST RECENT HEMOGLOBIN A1C LEVEL 8.0 - < 9.0%: ICD-10-PCS | Mod: CPTII,,, | Performed by: INTERNAL MEDICINE

## 2023-03-23 PROCEDURE — 36415 COLL VENOUS BLD VENIPUNCTURE: CPT | Performed by: INTERNAL MEDICINE

## 2023-03-23 PROCEDURE — 84100 ASSAY OF PHOSPHORUS: CPT | Performed by: INTERNAL MEDICINE

## 2023-03-23 PROCEDURE — 83880 ASSAY OF NATRIURETIC PEPTIDE: CPT | Performed by: INTERNAL MEDICINE

## 2023-03-23 PROCEDURE — 93750 OP LVAD INTERROGATION: ICD-10-PCS | Mod: S$PBB,,, | Performed by: INTERNAL MEDICINE

## 2023-03-23 PROCEDURE — 99999 PR PBB SHADOW E&M-EST. PATIENT-LVL IV: ICD-10-PCS | Mod: PBBFAC,,, | Performed by: INTERNAL MEDICINE

## 2023-03-23 PROCEDURE — 99214 OFFICE O/P EST MOD 30 MIN: CPT | Mod: PBBFAC | Performed by: INTERNAL MEDICINE

## 2023-03-23 PROCEDURE — 99215 OFFICE O/P EST HI 40 MIN: CPT | Mod: S$PBB,,, | Performed by: INTERNAL MEDICINE

## 2023-03-23 PROCEDURE — 3008F PR BODY MASS INDEX (BMI) DOCUMENTED: ICD-10-PCS | Mod: CPTII,,, | Performed by: INTERNAL MEDICINE

## 2023-03-23 PROCEDURE — 86140 C-REACTIVE PROTEIN: CPT | Performed by: INTERNAL MEDICINE

## 2023-03-23 PROCEDURE — 99999 PR PBB SHADOW E&M-EST. PATIENT-LVL IV: CPT | Mod: PBBFAC,,, | Performed by: INTERNAL MEDICINE

## 2023-03-23 NOTE — PROGRESS NOTES
Date of Implant with Heartmate 3 LVAD: 06/29/22    PATIENT ARRIVED IN CLINIC:  Ambulatory   Accompanied by: Significant Other   Complaints/reason for visit today: routine    Vitals  Temperature, oral:   Temp Readings from Last 1 Encounters:   03/23/23 98.2 °F (36.8 °C) (Oral)     Blood Pressure:   BP Readings from Last 3 Encounters:   03/23/23 (!) 90/0   03/08/23 (!) 93/26   02/07/23 99/65        VAD Interrogation:  TXP JOY INTERROGATIONS 3/23/2023 2/2/2023 2/2/2023   Type HeartMate3 HeartMate3 HeartMate3   Flow 4.1 4.3 3.4   Speed 5100 5500 5100   PI 6.1 3.6 7.0   Power (Serna) 3.7 4.1 3.7   LSL 4700 5100 4700   Pulsatility - No Pulse -     HCT:   Lab Results   Component Value Date    HCT 38.8 (L) 03/23/2023    HCT 47 12/15/2022       History Log: JXP  Problems / Issues / Alarms with VAD if any:  Yes , Low Flow in AM when pt is asleep  VAD Sounds: HM3 Smooth    VAD Binder With Patient: no   Reviewed VAD Numbers In Binder: no    Equipment:  Emergency Equipment With Patient: yes   Any Equipment Issues: None noted   It is medically necessary to ensure patient has properly functioning equipment and wearables to prevent infection, injury or death to patient.     DLES Assessment:  Appearance Of Driveline: 1  Antibiotics: NO  Velour: no  Manual & Visual Inspection Of Driveline: No kinks or tears noted  Stabilization Device In Use:  Tape [place]     Heartmate 3 Module Cable:  No yellow exposed and Attempted to unscrew modular cable to ensure it will be able to come lose in the event we ever need to change the modular cable while patient held the driveline in place so it would not move. Modular cable connection able to be unscrewed and re-tightened. Instructed pt to perform this weekly.    Patient MyChart Questionnaire: No flowsheet data found.     Assessment:   PAIN: NO  Complaints Of Nausea / Vomiting: None noted    Appearance and Frequency Of Stools: normal and formed without blood & daily  Color Of Urine:  clear/yellow  Coping/Depression/Anxiety: coping okay  Sleep Habits: 8 hrs /night  Sleep Aids: None noted  Showering: Yes, reminded to change dressing immediately after drying off  Activity/Exercise: Walking   Driving: Yes. Reminded to pull over should there be an alarm before looking down at controller.    DLES Dressing Care:   Frequency of Dressing Changes: daily & daily kit  Pt In Need Of Management Kits?:yes -   2 Box of daily kit  It is medically necessary to have VAD management kits in order to prevent infection or to assist in the healing of an infected DLES.    Labs:    Chemistry        Component Value Date/Time     02/02/2023 1315    K 3.8 02/02/2023 1315     02/02/2023 1315    CO2 20 (L) 02/02/2023 1315    BUN 14 02/02/2023 1315    CREATININE 1.3 02/02/2023 1315     (H) 02/02/2023 1315        Component Value Date/Time    CALCIUM 9.4 02/02/2023 1315    ALKPHOS 99 02/02/2023 1315    AST 47 (H) 02/02/2023 1315    ALT 29 02/02/2023 1315    BILITOT 0.4 02/02/2023 1315    ESTGFRAFRICA >60.0 07/27/2022 1229    EGFRNONAA 54.2 (A) 07/27/2022 1229            Magnesium   Date Value Ref Range Status   02/02/2023 1.8 1.6 - 2.6 mg/dL Final       Lab Results   Component Value Date    WBC 9.10 03/23/2023    HGB 12.4 (L) 03/23/2023    HCT 38.8 (L) 03/23/2023    MCV 82 03/23/2023     03/23/2023       Lab Results   Component Value Date    INR 2.1 (H) 03/23/2023    INR 1.9 03/14/2023    INR 1.63 (H) 03/08/2023    PROTIME 19.1 (H) 03/08/2023    PROTIME 23.7 (H) 12/30/2022    PROTIME 20.9 (H) 11/23/2022       BNP   Date Value Ref Range Status   02/02/2023 375 (H) 0 - 99 pg/mL Final     Comment:     Values of less than 100 pg/ml are consistent with non-CHF populations.   12/16/2022 37 0 - 99 pg/mL Final     Comment:     Values of less than 100 pg/ml are consistent with non-CHF populations.   12/15/2022 91 0 - 99 pg/mL Final     Comment:     Values of less than 100 pg/ml are consistent with non-CHF  populations.     Natriuretic Peptide   Date Value Ref Range Status   12/30/2022 188.1 (H) <=100.0 pg/mL Final       LD   Date Value Ref Range Status   03/23/2023 303 (H) 110 - 260 U/L Final     Comment:     Results are increased in hemolyzed samples.   02/02/2023 270 (H) 110 - 260 U/L Final     Comment:     Results are increased in hemolyzed samples.   12/16/2022 224 110 - 260 U/L Final     Comment:     Results are increased in hemolyzed samples.       Labs reviewed with patient: YES     Medication Reconciliation: per MA.  New Medication Detail Provided: yes  Coumadin Managed by: [facility]    Education: Reviewed driveline care, emergency procedures, how to change the controller, alarms with patient, as well as discussed how to page the VAD coordinator in case of an emergency.   Educated patient/family that chest compressions are allowed in the event they are needed.    Reminded patient/caregiver not to touch their face and to cover their mouth when they cough or sneeze.   Vaccines: Pt informed that we are encouraging all VAD patients to receive the Flu and COVID vaccines and boosters. Informed pt that they can take tylenol but should avoid other NSAIDs.       Plans/Needs: Pt in clinic for routine F/U with Dr. Villatoro. MPU device check performed by PHILIPP Kim. Pt has no needs at this time.Pt to return to clinic in one month. Appt scheduled.     Hurricane Season: No

## 2023-03-23 NOTE — PROGRESS NOTES
"Subjective:   Patient ID:  Kevan Queen is a 37 y.o. male who presents for LVAD followup visit.    Implant Date:6/29/2022     Heartmate 3 RPM 5100     INR goal: 2-3   Bridge with Heparin/lovenox   Antiplatelets: Alon trial   Inotropes: Milrinone @ 0.25 mcg/kg/min in D5W    TXP JOY INTERROGATIONS 3/23/2023   Type HeartMate3   Flow 4.1   Speed 5100   PI 6.1   Power (Serna) 3.7   LSL 4700   Pulsatility -       HPI  Very pleasant 36 yo black male with stage d HFrEF, NICM, ? 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 He underwent RVAD removal and chest closure 6/30/2022.  He also completed a course of IV Abx for staph epi. He was weaned off  but he had to restarted due to RVF. He was eventually transitioned to milrinone (secondary to  shortage) now on 0.25 mcg/kg/min. He also has a history of unclear syncope vs seizures and is followed by neuro for this and is on Keppra. He presents today for follow up visit. Doing well today. Has no complaints. VAD speed is at 5100 rpm. No VAD alarms noted on interrogation occasional PI events . BP is 90 mm of hg (Doppler). DLES is a "1". INR is therapeutic at 2.1. LDH is at baseline.     Review of Systems   Constitutional: Negative. Negative for chills, decreased appetite, diaphoresis, fever, malaise/fatigue, night sweats, weight gain and weight loss.   Eyes: Negative.    Cardiovascular:  Negative for chest pain, claudication, cyanosis, dyspnea on exertion, irregular heartbeat, leg swelling, near-syncope, orthopnea, palpitations, paroxysmal nocturnal dyspnea and syncope.   Respiratory:  Negative for cough, hemoptysis and shortness of breath.    Endocrine: Negative.    Hematologic/Lymphatic: Negative.    Skin:  Negative for color change, dry skin and nail changes. " "  Musculoskeletal: Negative.    Gastrointestinal: Negative.    Genitourinary: Negative.    Neurological:  Negative for weakness.     Objective:   Blood pressure (!) 90/0, temperature 98.2 °F (36.8 °C), temperature source Oral, height 6' 3" (1.905 m), weight 104 kg (229 lb 3.2 oz).body mass index is 28.65 kg/m².    Doppler: 90    Physical Exam  Vitals reviewed.   Constitutional:       Appearance: He is well-developed.      Comments: BP (!) 90/0 (BP Location: Left arm, Patient Position: Sitting)   Temp 98.2 °F (36.8 °C) (Oral)   Ht 6' 3" (1.905 m)   Wt 104 kg (229 lb 3.2 oz)   BMI 28.65 kg/m²      HENT:      Head: Normocephalic.   Neck:      Vascular: No carotid bruit or JVD.   Cardiovascular:      Heart sounds: No murmur heard.     Comments: Smooth VAD hum. DLES is "1"  Pulmonary:      Effort: Pulmonary effort is normal.      Breath sounds: Normal breath sounds.   Abdominal:      General: Bowel sounds are normal.      Palpations: Abdomen is soft.   Skin:     General: Skin is warm.   Neurological:      Mental Status: He is alert.     Lab Results   Component Value Date    WBC 9.10 03/23/2023    HGB 12.4 (L) 03/23/2023    HCT 38.8 (L) 03/23/2023    MCV 82 03/23/2023     03/23/2023    CHLORIDE 106 12/30/2022    CO2 25 03/23/2023    CREATININE 1.5 (H) 03/23/2023    GLUCOSE 226 (H) 12/30/2022    CALCIUM 10.3 03/23/2023    ALKALINEPHOS 98 06/02/2022    ALBUMIN 4.2 03/23/2023    AST 22 03/23/2023     (H) 03/23/2023    ALT 15 03/23/2023     (H) 03/23/2023       Lab Results   Component Value Date    INR 2.1 (H) 03/23/2023    INR 1.9 03/14/2023    INR 1.63 (H) 03/08/2023    PROTIME 19.1 (H) 03/08/2023    PROTIME 23.7 (H) 12/30/2022    PROTIME 20.9 (H) 11/23/2022       BNP   Date Value Ref Range Status   02/02/2023 375 (H) 0 - 99 pg/mL Final     Comment:     Values of less than 100 pg/ml are consistent with non-CHF populations.   12/16/2022 37 0 - 99 pg/mL Final     Comment:     Values of less than 100 " pg/ml are consistent with non-CHF populations.   12/15/2022 91 0 - 99 pg/mL Final     Comment:     Values of less than 100 pg/ml are consistent with non-CHF populations.     Natriuretic Peptide   Date Value Ref Range Status   12/30/2022 188.1 (H) <=100.0 pg/mL Final       LD   Date Value Ref Range Status   03/23/2023 303 (H) 110 - 260 U/L Final     Comment:     Results are increased in hemolyzed samples.   02/02/2023 270 (H) 110 - 260 U/L Final     Comment:     Results are increased in hemolyzed samples.   12/16/2022 224 110 - 260 U/L Final     Comment:     Results are increased in hemolyzed samples.         Assessment:      1. LVAD (left ventricular assist device) present    2. Chronic combined systolic and diastolic heart failure    3. NICM (nonischemic cardiomyopathy)    4. ICD (implantable cardioverter-defibrillator) in place    5. Right heart failure secondary to left heart failure-post LVAD surgery    6. Type 2 diabetes mellitus with hyperglycemia, with long-term current use of insulin        Plan:   Patient is now NYHA class II. BP is controlled.  INR is therapeutic.  VAD interrogation was performed in clinic  Any VAD management kits dispensed today medically necessary  Recommend 2 gram sodium restriction and 1500cc fluid restriction.  Encourage physical activity with graded exercise program.  Requested patient to weigh themselves daily, and to notify us if their weight increases by more than 3 lbs in 1 day or 5 lbs in 1 week.   Additionally, the patient has a patient centered goal of being able to continue doing well  RTC in 1 month    Patient advised that it is recommended that all patients, and their close contacts and household members receive Covid vaccination.    Listed for transplant: No    UNOS Patient Status  Functional Status: 50% - Requires considerable assistance and frequent medical care  Physical Capacity: No Limitations  Working for Income: Unknown    Natalya Villatoro MD

## 2023-03-23 NOTE — PROCEDURES
TXP JOY INTERROGATIONS 3/23/2023 2/2/2023 2/2/2023 12/16/2022 12/16/2022 12/15/2022 10/31/2022   Type HeartMate3 HeartMate3 HeartMate3 - - HeartMate3 -   Flow 4.1 4.3 3.4 - - 3.6 -   Speed 5100 5500 5100 - - 5100 -   PI 6.1 3.6 7.0 - - 7.5 -   Power (Serna) 3.7 4.1 3.7 - - 3.8 -   LSL 4700 5100 4700 - - 4700 -   Pulsatility - No Pulse - Intermittent pulse Intermittent pulse No Pulse Pulse   }

## 2023-03-23 NOTE — PROGRESS NOTES
Maintenance was done on patient's MPU. Patient was educated on equipment cleaning and safety lock on primary controller was cleaned.    Equipment:  Any Equipment Issues: None noted     Maintenance Tracking  Patient has monthly checklist today: no}  Patient correctly utilizing monthly checklist: no  Educated patient on utilizing monthly checklist correctly and importance of this: yes    Equipment Inspection  Inspected patient's equipment today: yes  Equipment clean and free of debris, including locking mechanism: yes  Cleaned equipment today and educated patient on how to do this: yes    Mobile Power Unit  Mobile power unit serial number: [id number]  MPU maintenance due: 9/2023  MPU maintenance completed today:  yes  Mobile Power Unit 6 month maintenance and AA battery replacement complete. MPU, MPU patient cable and AC power cord inspected for damage and noted to be in good condition.      It is medically necessary to ensure patient has properly functioning equipment and wearables to prevent infection, injury or death to patient.

## 2023-03-23 NOTE — LETTER
March 23, 2023        Inderjit Hendricks  [address]  [address]  [place] [address]  Phone: [phone]  Fax: [phone]     Josh Pulido  [address] [address]  Phone: [phone]  Fax: [phone]             [id number]  [address]  [place] [address]  Phone: [phone]   Patient: Kevan Queen   MR Number: [id number]   YOB: 1985   Date of Visit: 3/23/2023       Dear Dr. Inderjit Hendricks, Josh Pulido    Thank you for referring Kevan Queen to me for evaluation. Attached you will find relevant portions of my assessment and plan of care.    If you have questions, please do not hesitate to call me. I look forward to following Kevan Queen along with you.    Sincerely,    Natalya Villatoro MD    Enclosure    If you would like to receive this communication electronically, please contact [email or web address] or [phone] to request [employer] Link access.    [employer] is a tool which provides read-only access to select patient information with whom you have a relationship. Its easy to use and provides real time access to review your patients record including encounter summaries, notes, results, and demographic information.    If you feel you have received this communication in error or would no longer like to receive these types of communications, please e-mail [email or web address]

## 2023-03-24 NOTE — PROGRESS NOTES
Study Title:  Antiplatelet Removal and HemocompatIbility EventS with the HeartMate 3 Pump   Protocol: CRD_971  IRB #/Approval Date: [id number]  Sponsor: [employer]  : Dr. Luis F Paige   Others in Attendance: spouse    I conducted the Week 1 follow-up visit in [facility] room 86491 and chart review.  Mr Queen desires to continue in study. I explained the Randomization process and investigational meds. Protocol required data is completed and entered into the EDC, including , Pump Parameters, Vital Signs, Labs. There are no questions from the subject and/or  his wife.    
Heterosexual

## 2023-03-28 LAB
EXT ALBUMIN: 3.9
EXT ALT: 16
EXT AST: 31
EXT BILIRUBIN TOTAL: 1.1
EXT BNP: 525
EXT BUN: 9
EXT CALCIUM: 8.9
EXT CHLORIDE: 106
EXT CREATININE: 1.27 MG/DL
EXT GLUCOSE: 175
EXT HEMATOCRIT: 43.6
EXT HEMOGLOBIN: 13.8
EXT MAGNESIUM: 2
EXT PLATELETS: 212
EXT POTASSIUM: 4.4
EXT PROTEIN TOTAL: 7.2
EXT SODIUM: 137 MMOL/L
EXT WBC: 7.5
INR PPP: 1.8

## 2023-03-28 NOTE — PROGRESS NOTES
Study Title:  Drug-ELuting REGistry: ReAl-World Treatment of LesioNs in the Peripheral VasCulaturE   Protocol: IRB #/Approval Date: 2021.090 / July 18, 2021  Sponsor: [employer]  : Dr. Tab Bernal    Mr. Queen was met in the [facility] to begin the [facility] Month 9 visit. Labs drawn. He states that he wants to continue in the trial. A new order for MELARA was entered and filled by [facility]. Bottle given to him.  He had no questions/concerns at this time. The following protocol items were completed and entered in the EDC: Vitals, Labs, Pump Parameters.

## 2023-03-29 ENCOUNTER — TELEPHONE (OUTPATIENT)
Dept: TRANSPLANT | Facility: CLINIC | Age: 38
End: 2023-03-29
Payer: MEDICAID

## 2023-03-29 ENCOUNTER — ANTI-COAG VISIT (OUTPATIENT)
Dept: CARDIOLOGY | Facility: CLINIC | Age: 38
End: 2023-03-29
Payer: MEDICAID

## 2023-03-29 DIAGNOSIS — Z95.811 LVAD (LEFT VENTRICULAR ASSIST DEVICE) PRESENT: Primary | ICD-10-CM

## 2023-03-31 ENCOUNTER — TELEPHONE (OUTPATIENT)
Dept: TRANSPLANT | Facility: CLINIC | Age: 38
End: 2023-03-31
Payer: MEDICAID

## 2023-03-31 NOTE — TELEPHONE ENCOUNTER
Phone call received from Mindy at [facility]. She states that they have not been able to get in touch with the patient or his wife for 11 days. Patient has missed his BP checks, labs and dressing changes. Mindy was told today by her supervisor that they will no longer supply the patient with milrinone since he is being non-complaint. Mindy states that she was able to speak with patient's wife today who states that he has 8 bags of medicine left. However, per calculation with the infusion company this cannot be correct unless they are missing doses or infusing wrong. At this time, patient will need to report to the ER for Milrinone infusion.     Our VAD MA, Leni, spoke with Mr. Queen who states that he is on his last bag of milrinone now and will last him through tomorrow. Patient states that he got a new phone and did not realize that he was missing appointments but that he did get labs on Tuesday. I urged that patient call [facility] now to explain his situation and ask if they will supply his Milrinone. If not, he will need to go to the ER. Patient states that his birthday is tomorrow so he is not going to the ER. Explained to patient what will happen if he runs out of Milrinone including cardiogenic shock, need for emergency services and/or death and patient verbalized understanding.

## 2023-04-02 ENCOUNTER — HOSPITAL ENCOUNTER (OUTPATIENT)
Facility: HOSPITAL | Age: 38
Discharge: HOME | DRG: 292 | End: 2023-04-06
Attending: INTERNAL MEDICINE | Admitting: INTERNAL MEDICINE
Payer: MEDICAID

## 2023-04-02 ENCOUNTER — HOSPITAL ENCOUNTER (EMERGENCY)
Facility: HOSPITAL | Age: 38
Discharge: ANOTHER HOSPITAL | End: 2023-04-02
Attending: EMERGENCY MEDICINE
Payer: MEDICAID

## 2023-04-02 VITALS
WEIGHT: 236 LBS | BODY MASS INDEX: 29.34 KG/M2 | OXYGEN SATURATION: 100 % | RESPIRATION RATE: 18 BRPM | TEMPERATURE: 98 F | HEART RATE: 95 BPM | HEIGHT: 75 IN

## 2023-04-02 DIAGNOSIS — I50.9 ACUTE ON CHRONIC HEART FAILURE, UNSPECIFIED HEART FAILURE TYPE: Primary | ICD-10-CM

## 2023-04-02 DIAGNOSIS — E11.65 TYPE 2 DIABETES MELLITUS WITH HYPERGLYCEMIA, WITH LONG-TERM CURRENT USE OF INSULIN: Primary | ICD-10-CM

## 2023-04-02 DIAGNOSIS — I50.43 ACUTE ON CHRONIC COMBINED SYSTOLIC AND DIASTOLIC HEART FAILURE: ICD-10-CM

## 2023-04-02 DIAGNOSIS — Z95.811 LVAD (LEFT VENTRICULAR ASSIST DEVICE) PRESENT: ICD-10-CM

## 2023-04-02 DIAGNOSIS — I50.9 ACUTE ON CHRONIC HEART FAILURE: ICD-10-CM

## 2023-04-02 DIAGNOSIS — Z79.4 TYPE 2 DIABETES MELLITUS WITH HYPERGLYCEMIA, WITH LONG-TERM CURRENT USE OF INSULIN: Primary | ICD-10-CM

## 2023-04-02 LAB
ALBUMIN SERPL BCP-MCNC: 3.7 G/DL (ref 3.5–5.2)
ALBUMIN SERPL-MCNC: 3.7 G/DL (ref 3.5–5)
ALBUMIN/GLOB SERPL: 1.1 RATIO (ref 1.1–2)
ALLENS TEST: ABNORMAL
ALLENS TEST: ABNORMAL
ALP SERPL-CCNC: 101 U/L (ref 55–135)
ALP SERPL-CCNC: 92 UNIT/L (ref 40–150)
ALT SERPL W/O P-5'-P-CCNC: 15 U/L (ref 10–44)
ALT SERPL-CCNC: 15 UNIT/L (ref 0–55)
ANION GAP SERPL CALC-SCNC: 10 MMOL/L (ref 8–16)
ANION GAP SERPL CALC-SCNC: 11 MMOL/L (ref 8–16)
APTT PPP: 30.1 SECONDS (ref 23.2–33.7)
APTT PPP: 31.2 SEC (ref 21–32)
AST SERPL-CCNC: 21 UNIT/L (ref 5–34)
AST SERPL-CCNC: 22 U/L (ref 10–40)
BASOPHILS # BLD AUTO: 0.03 K/UL (ref 0–0.2)
BASOPHILS # BLD AUTO: 0.05 X10(3)/MCL (ref 0–0.2)
BASOPHILS NFR BLD AUTO: 0.5 %
BASOPHILS NFR BLD: 0.3 % (ref 0–1.9)
BILIRUB SERPL-MCNC: 1.2 MG/DL (ref 0.1–1)
BILIRUBIN DIRECT+TOT PNL SERPL-MCNC: 1.7 MG/DL
BNP BLD-MCNC: 422.4 PG/ML
BUN SERPL-MCNC: 10 MG/DL (ref 6–20)
BUN SERPL-MCNC: 9 MG/DL (ref 6–20)
BUN SERPL-MCNC: 9.1 MG/DL (ref 8.9–20.6)
CALCIUM SERPL-MCNC: 8.9 MG/DL (ref 8.7–10.5)
CALCIUM SERPL-MCNC: 9.1 MG/DL (ref 8.4–10.2)
CALCIUM SERPL-MCNC: 9.6 MG/DL (ref 8.7–10.5)
CHLORIDE SERPL-SCNC: 101 MMOL/L (ref 95–110)
CHLORIDE SERPL-SCNC: 105 MMOL/L (ref 95–110)
CHLORIDE SERPL-SCNC: 105 MMOL/L (ref 98–107)
CO2 SERPL-SCNC: 24 MMOL/L (ref 23–29)
CO2 SERPL-SCNC: 26 MMOL/L (ref 23–29)
CO2 SERPL-SCNC: 27 MMOL/L (ref 22–29)
CREAT SERPL-MCNC: 1.3 MG/DL (ref 0.5–1.4)
CREAT SERPL-MCNC: 1.3 MG/DL (ref 0.5–1.4)
CREAT SERPL-MCNC: 1.59 MG/DL (ref 0.73–1.18)
DELSYS: ABNORMAL
DIFFERENTIAL METHOD: ABNORMAL
EOSINOPHIL # BLD AUTO: 0.09 X10(3)/MCL (ref 0–0.9)
EOSINOPHIL # BLD AUTO: 0.1 K/UL (ref 0–0.5)
EOSINOPHIL NFR BLD AUTO: 1 %
EOSINOPHIL NFR BLD: 1.2 % (ref 0–8)
ERYTHROCYTE [DISTWIDTH] IN BLOOD BY AUTOMATED COUNT: 16.6 % (ref 11.5–14.5)
ERYTHROCYTE [DISTWIDTH] IN BLOOD BY AUTOMATED COUNT: 16.7 % (ref 11.5–17)
EST. GFR  (NO RACE VARIABLE): >60 ML/MIN/1.73 M^2
EST. GFR  (NO RACE VARIABLE): >60 ML/MIN/1.73 M^2
ESTIMATED AVG GLUCOSE: 177 MG/DL (ref 68–131)
ESTIMATED AVG GLUCOSE: 180 MG/DL (ref 68–131)
FIO2: 21
GFR SERPLBLD CREATININE-BSD FMLA CKD-EPI: 57 MLS/MIN/1.73/M2
GLOBULIN SER-MCNC: 3.5 GM/DL (ref 2.4–3.5)
GLUCOSE SERPL-MCNC: 115 MG/DL (ref 74–100)
GLUCOSE SERPL-MCNC: 152 MG/DL (ref 70–110)
GLUCOSE SERPL-MCNC: 177 MG/DL (ref 70–110)
HBA1C MFR BLD: 7.8 % (ref 4–5.6)
HBA1C MFR BLD: 7.9 % (ref 4–5.6)
HCT VFR BLD AUTO: 34 % (ref 42–52)
HCT VFR BLD AUTO: 34.9 % (ref 40–54)
HGB BLD-MCNC: 10.8 G/DL (ref 14–18)
HGB BLD-MCNC: 11 G/DL (ref 14–18)
IMM GRANULOCYTES # BLD AUTO: 0.02 K/UL (ref 0–0.04)
IMM GRANULOCYTES # BLD AUTO: 0.02 X10(3)/MCL (ref 0–0.04)
IMM GRANULOCYTES NFR BLD AUTO: 0.2 %
IMM GRANULOCYTES NFR BLD AUTO: 0.2 % (ref 0–0.5)
INR BLD: 1.75 (ref 0–1.3)
INR PPP: 1.7 (ref 0.8–1.2)
LACTATE SERPL-SCNC: 1.3 MMOL/L (ref 0.5–2.2)
LDH SERPL L TO P-CCNC: 291 U/L (ref 110–260)
LYMPHOCYTES # BLD AUTO: 2.4 K/UL (ref 1–4.8)
LYMPHOCYTES # BLD AUTO: 2.86 X10(3)/MCL (ref 0.6–4.6)
LYMPHOCYTES NFR BLD AUTO: 30.7 %
LYMPHOCYTES NFR BLD: 26.1 % (ref 18–48)
MAGNESIUM SERPL-MCNC: 1.6 MG/DL (ref 1.6–2.6)
MCH RBC QN AUTO: 25.7 PG (ref 27–31)
MCH RBC QN AUTO: 25.9 PG (ref 27–31)
MCHC RBC AUTO-ENTMCNC: 30.9 G/DL (ref 32–36)
MCHC RBC AUTO-ENTMCNC: 32.4 G/DL (ref 33–36)
MCV RBC AUTO: 80 FL (ref 80–94)
MCV RBC AUTO: 83 FL (ref 82–98)
MODE: ABNORMAL
MONOCYTES # BLD AUTO: 0.8 K/UL (ref 0.3–1)
MONOCYTES # BLD AUTO: 0.93 X10(3)/MCL (ref 0.1–1.3)
MONOCYTES NFR BLD AUTO: 10 %
MONOCYTES NFR BLD: 9 % (ref 4–15)
NEUTROPHILS # BLD AUTO: 5.36 X10(3)/MCL (ref 2.1–9.2)
NEUTROPHILS # BLD AUTO: 5.9 K/UL (ref 1.8–7.7)
NEUTROPHILS NFR BLD AUTO: 57.6 %
NEUTROPHILS NFR BLD: 63.2 % (ref 38–73)
NRBC BLD AUTO-RTO: 0 %
NRBC BLD-RTO: 0 /100 WBC
PLATELET # BLD AUTO: 244 K/UL (ref 150–450)
PLATELET # BLD AUTO: 249 X10(3)/MCL (ref 130–400)
PMV BLD AUTO: 10.1 FL (ref 7.4–10.4)
PMV BLD AUTO: 9.8 FL (ref 9.2–12.9)
PO2 BLDA: 18 MMHG (ref 40–60)
PO2 BLDA: 28 MMHG (ref 40–60)
POC SATURATED O2: 24 % (ref 95–100)
POC SATURATED O2: 50 % (ref 95–100)
POCT GLUCOSE: 120 MG/DL (ref 70–110)
POTASSIUM SERPL-SCNC: 3.4 MMOL/L (ref 3.5–5.1)
POTASSIUM SERPL-SCNC: 3.8 MMOL/L (ref 3.5–5.1)
POTASSIUM SERPL-SCNC: 4.1 MMOL/L (ref 3.5–5.1)
PROT SERPL-MCNC: 7.2 GM/DL (ref 6.4–8.3)
PROT SERPL-MCNC: 7.5 G/DL (ref 6–8.4)
PROTHROMBIN TIME: 17.2 SEC (ref 9–12.5)
PROTHROMBIN TIME: 20.2 SECONDS (ref 12.5–14.5)
RBC # BLD AUTO: 4.21 M/UL (ref 4.6–6.2)
RBC # BLD AUTO: 4.25 X10(6)/MCL (ref 4.7–6.1)
SAMPLE: ABNORMAL
SAMPLE: ABNORMAL
SITE: ABNORMAL
SITE: ABNORMAL
SODIUM SERPL-SCNC: 138 MMOL/L (ref 136–145)
SODIUM SERPL-SCNC: 139 MMOL/L (ref 136–145)
SODIUM SERPL-SCNC: 140 MMOL/L (ref 136–145)
SP02: 99
TROPONIN I SERPL-MCNC: 0.12 NG/ML (ref 0–0.04)
WBC # BLD AUTO: 9.31 K/UL (ref 3.9–12.7)
WBC # SPEC AUTO: 9.3 X10(3)/MCL (ref 4.5–11.5)

## 2023-04-02 PROCEDURE — 63600175 PHARM REV CODE 636 W HCPCS: Performed by: NURSE PRACTITIONER

## 2023-04-02 PROCEDURE — 83605 ASSAY OF LACTIC ACID: CPT | Performed by: STUDENT IN AN ORGANIZED HEALTH CARE EDUCATION/TRAINING PROGRAM

## 2023-04-02 PROCEDURE — 94761 N-INVAS EAR/PLS OXIMETRY MLT: CPT

## 2023-04-02 PROCEDURE — 20600001 HC STEP DOWN PRIVATE ROOM

## 2023-04-02 PROCEDURE — 83036 HEMOGLOBIN GLYCOSYLATED A1C: CPT | Mod: 91 | Performed by: NURSE PRACTITIONER

## 2023-04-02 PROCEDURE — 83880 ASSAY OF NATRIURETIC PEPTIDE: CPT | Performed by: EMERGENCY MEDICINE

## 2023-04-02 PROCEDURE — 83735 ASSAY OF MAGNESIUM: CPT | Performed by: STUDENT IN AN ORGANIZED HEALTH CARE EDUCATION/TRAINING PROGRAM

## 2023-04-02 PROCEDURE — 63600175 PHARM REV CODE 636 W HCPCS: Performed by: EMERGENCY MEDICINE

## 2023-04-02 PROCEDURE — 27000248 HC VAD-ADDITIONAL DAY

## 2023-04-02 PROCEDURE — G0378 HOSPITAL OBSERVATION PER HR: HCPCS

## 2023-04-02 PROCEDURE — 63600175 PHARM REV CODE 636 W HCPCS: Performed by: STUDENT IN AN ORGANIZED HEALTH CARE EDUCATION/TRAINING PROGRAM

## 2023-04-02 PROCEDURE — 99285 EMERGENCY DEPT VISIT HI MDM: CPT

## 2023-04-02 PROCEDURE — 27000685 HC LVAD KIT (30 DAY SUPPLY)

## 2023-04-02 PROCEDURE — 99222 PR INITIAL HOSPITAL CARE,LEVL II: ICD-10-PCS | Mod: ,,, | Performed by: NURSE PRACTITIONER

## 2023-04-02 PROCEDURE — 85730 THROMBOPLASTIN TIME PARTIAL: CPT | Performed by: EMERGENCY MEDICINE

## 2023-04-02 PROCEDURE — 96374 THER/PROPH/DIAG INJ IV PUSH: CPT

## 2023-04-02 PROCEDURE — 85025 COMPLETE CBC W/AUTO DIFF WBC: CPT | Performed by: STUDENT IN AN ORGANIZED HEALTH CARE EDUCATION/TRAINING PROGRAM

## 2023-04-02 PROCEDURE — 85730 THROMBOPLASTIN TIME PARTIAL: CPT | Performed by: STUDENT IN AN ORGANIZED HEALTH CARE EDUCATION/TRAINING PROGRAM

## 2023-04-02 PROCEDURE — G0379 DIRECT REFER HOSPITAL OBSERV: HCPCS

## 2023-04-02 PROCEDURE — 84484 ASSAY OF TROPONIN QUANT: CPT | Performed by: EMERGENCY MEDICINE

## 2023-04-02 PROCEDURE — 96372 THER/PROPH/DIAG INJ SC/IM: CPT | Performed by: STUDENT IN AN ORGANIZED HEALTH CARE EDUCATION/TRAINING PROGRAM

## 2023-04-02 PROCEDURE — 83615 LACTATE (LD) (LDH) ENZYME: CPT | Performed by: STUDENT IN AN ORGANIZED HEALTH CARE EDUCATION/TRAINING PROGRAM

## 2023-04-02 PROCEDURE — 80053 COMPREHEN METABOLIC PANEL: CPT | Performed by: STUDENT IN AN ORGANIZED HEALTH CARE EDUCATION/TRAINING PROGRAM

## 2023-04-02 PROCEDURE — 85610 PROTHROMBIN TIME: CPT | Performed by: STUDENT IN AN ORGANIZED HEALTH CARE EDUCATION/TRAINING PROGRAM

## 2023-04-02 PROCEDURE — 36415 COLL VENOUS BLD VENIPUNCTURE: CPT | Performed by: STUDENT IN AN ORGANIZED HEALTH CARE EDUCATION/TRAINING PROGRAM

## 2023-04-02 PROCEDURE — 96372 THER/PROPH/DIAG INJ SC/IM: CPT | Performed by: NURSE PRACTITIONER

## 2023-04-02 PROCEDURE — 99900035 HC TECH TIME PER 15 MIN (STAT)

## 2023-04-02 PROCEDURE — 85025 COMPLETE CBC W/AUTO DIFF WBC: CPT | Performed by: EMERGENCY MEDICINE

## 2023-04-02 PROCEDURE — 85610 PROTHROMBIN TIME: CPT | Performed by: EMERGENCY MEDICINE

## 2023-04-02 PROCEDURE — 83036 HEMOGLOBIN GLYCOSYLATED A1C: CPT | Performed by: STUDENT IN AN ORGANIZED HEALTH CARE EDUCATION/TRAINING PROGRAM

## 2023-04-02 PROCEDURE — 80048 BASIC METABOLIC PNL TOTAL CA: CPT | Mod: XB | Performed by: STUDENT IN AN ORGANIZED HEALTH CARE EDUCATION/TRAINING PROGRAM

## 2023-04-02 PROCEDURE — 25000003 PHARM REV CODE 250: Performed by: STUDENT IN AN ORGANIZED HEALTH CARE EDUCATION/TRAINING PROGRAM

## 2023-04-02 PROCEDURE — 99222 1ST HOSP IP/OBS MODERATE 55: CPT | Mod: ,,, | Performed by: NURSE PRACTITIONER

## 2023-04-02 PROCEDURE — 80053 COMPREHEN METABOLIC PANEL: CPT | Performed by: EMERGENCY MEDICINE

## 2023-04-02 RX ORDER — IBUPROFEN 200 MG
16 TABLET ORAL
Status: DISCONTINUED | OUTPATIENT
Start: 2023-04-02 | End: 2023-04-02

## 2023-04-02 RX ORDER — INSULIN ASPART 100 [IU]/ML
10 INJECTION, SOLUTION INTRAVENOUS; SUBCUTANEOUS
Status: DISCONTINUED | OUTPATIENT
Start: 2023-04-02 | End: 2023-04-07 | Stop reason: HOSPADM

## 2023-04-02 RX ORDER — MAGNESIUM SULFATE HEPTAHYDRATE 40 MG/ML
2 INJECTION, SOLUTION INTRAVENOUS ONCE
Status: COMPLETED | OUTPATIENT
Start: 2023-04-02 | End: 2023-04-02

## 2023-04-02 RX ORDER — POTASSIUM CHLORIDE 20 MEQ/1
20 TABLET, EXTENDED RELEASE ORAL ONCE
Status: COMPLETED | OUTPATIENT
Start: 2023-04-02 | End: 2023-04-02

## 2023-04-02 RX ORDER — FUROSEMIDE 10 MG/ML
80 INJECTION INTRAMUSCULAR; INTRAVENOUS DAILY
Status: DISCONTINUED | OUTPATIENT
Start: 2023-04-03 | End: 2023-04-03

## 2023-04-02 RX ORDER — METHOCARBAMOL 500 MG/1
500 TABLET, FILM COATED ORAL 4 TIMES DAILY
Status: COMPLETED | OUTPATIENT
Start: 2023-04-02 | End: 2023-04-03

## 2023-04-02 RX ORDER — FUROSEMIDE 10 MG/ML
80 INJECTION INTRAMUSCULAR; INTRAVENOUS DAILY
Status: DISCONTINUED | OUTPATIENT
Start: 2023-04-02 | End: 2023-04-02

## 2023-04-02 RX ORDER — DEXTROSE 40 %
15 GEL (GRAM) ORAL
Status: DISCONTINUED | OUTPATIENT
Start: 2023-04-02 | End: 2023-04-02

## 2023-04-02 RX ORDER — INSULIN ASPART 100 [IU]/ML
1-10 INJECTION, SOLUTION INTRAVENOUS; SUBCUTANEOUS
Status: DISCONTINUED | OUTPATIENT
Start: 2023-04-02 | End: 2023-04-07 | Stop reason: HOSPADM

## 2023-04-02 RX ORDER — INSULIN ASPART 100 [IU]/ML
5 INJECTION, SOLUTION INTRAVENOUS; SUBCUTANEOUS
Status: DISCONTINUED | OUTPATIENT
Start: 2023-04-02 | End: 2023-04-02

## 2023-04-02 RX ORDER — LEVETIRACETAM 500 MG/1
1000 TABLET ORAL 2 TIMES DAILY
Status: DISCONTINUED | OUTPATIENT
Start: 2023-04-02 | End: 2023-04-07 | Stop reason: HOSPADM

## 2023-04-02 RX ORDER — DEXTROSE 40 %
30 GEL (GRAM) ORAL
Status: DISCONTINUED | OUTPATIENT
Start: 2023-04-02 | End: 2023-04-02

## 2023-04-02 RX ORDER — DEXTROSE 40 %
30 GEL (GRAM) ORAL
Status: DISCONTINUED | OUTPATIENT
Start: 2023-04-02 | End: 2023-04-07 | Stop reason: HOSPADM

## 2023-04-02 RX ORDER — POTASSIUM CHLORIDE 20 MEQ/1
40 TABLET, EXTENDED RELEASE ORAL ONCE
Status: COMPLETED | OUTPATIENT
Start: 2023-04-02 | End: 2023-04-02

## 2023-04-02 RX ORDER — INSULIN ASPART 100 [IU]/ML
0-5 INJECTION, SOLUTION INTRAVENOUS; SUBCUTANEOUS
Status: DISCONTINUED | OUTPATIENT
Start: 2023-04-02 | End: 2023-04-02

## 2023-04-02 RX ORDER — GLUCAGON 1 MG
1 KIT INJECTION
Status: DISCONTINUED | OUTPATIENT
Start: 2023-04-02 | End: 2023-04-07 | Stop reason: HOSPADM

## 2023-04-02 RX ORDER — GLUCAGON 1 MG
1 KIT INJECTION
Status: DISCONTINUED | OUTPATIENT
Start: 2023-04-02 | End: 2023-04-02

## 2023-04-02 RX ORDER — MILRINONE LACTATE 0.2 MG/ML
0.25 INJECTION, SOLUTION INTRAVENOUS CONTINUOUS
Status: DISCONTINUED | OUTPATIENT
Start: 2023-04-02 | End: 2023-04-02 | Stop reason: HOSPADM

## 2023-04-02 RX ORDER — IBUPROFEN 200 MG
24 TABLET ORAL
Status: DISCONTINUED | OUTPATIENT
Start: 2023-04-02 | End: 2023-04-02

## 2023-04-02 RX ORDER — GABAPENTIN 100 MG/1
100 CAPSULE ORAL 3 TIMES DAILY
Status: DISCONTINUED | OUTPATIENT
Start: 2023-04-02 | End: 2023-04-07 | Stop reason: HOSPADM

## 2023-04-02 RX ORDER — SPIRONOLACTONE 25 MG/1
25 TABLET ORAL DAILY
Status: DISCONTINUED | OUTPATIENT
Start: 2023-04-03 | End: 2023-04-07 | Stop reason: HOSPADM

## 2023-04-02 RX ORDER — FUROSEMIDE 10 MG/ML
80 INJECTION INTRAMUSCULAR; INTRAVENOUS
Status: DISCONTINUED | OUTPATIENT
Start: 2023-04-03 | End: 2023-04-02

## 2023-04-02 RX ORDER — OXYCODONE AND ACETAMINOPHEN 5; 325 MG/1; MG/1
1 TABLET ORAL EVERY 6 HOURS PRN
Status: DISCONTINUED | OUTPATIENT
Start: 2023-04-02 | End: 2023-04-07 | Stop reason: HOSPADM

## 2023-04-02 RX ORDER — INSULIN ASPART 100 [IU]/ML
1-10 INJECTION, SOLUTION INTRAVENOUS; SUBCUTANEOUS
Status: DISCONTINUED | OUTPATIENT
Start: 2023-04-02 | End: 2023-04-02

## 2023-04-02 RX ORDER — FAMOTIDINE 20 MG/1
40 TABLET, FILM COATED ORAL DAILY
Status: DISCONTINUED | OUTPATIENT
Start: 2023-04-02 | End: 2023-04-07 | Stop reason: HOSPADM

## 2023-04-02 RX ORDER — MILRINONE LACTATE 0.2 MG/ML
0.25 INJECTION, SOLUTION INTRAVENOUS CONTINUOUS
Status: DISCONTINUED | OUTPATIENT
Start: 2023-04-02 | End: 2023-04-07 | Stop reason: HOSPADM

## 2023-04-02 RX ORDER — DEXTROSE 40 %
15 GEL (GRAM) ORAL
Status: DISCONTINUED | OUTPATIENT
Start: 2023-04-02 | End: 2023-04-07 | Stop reason: HOSPADM

## 2023-04-02 RX ADMIN — INSULIN ASPART 2 UNITS: 100 INJECTION, SOLUTION INTRAVENOUS; SUBCUTANEOUS at 05:04

## 2023-04-02 RX ADMIN — MILRINONE LACTATE IN DEXTROSE 0.25 MCG/KG/MIN: 200 INJECTION, SOLUTION INTRAVENOUS at 05:04

## 2023-04-02 RX ADMIN — INSULIN ASPART 10 UNITS: 100 INJECTION, SOLUTION INTRAVENOUS; SUBCUTANEOUS at 05:04

## 2023-04-02 RX ADMIN — METHOCARBAMOL 500 MG: 500 TABLET ORAL at 04:04

## 2023-04-02 RX ADMIN — METHOCARBAMOL 500 MG: 500 TABLET ORAL at 09:04

## 2023-04-02 RX ADMIN — GABAPENTIN 100 MG: 100 CAPSULE ORAL at 02:04

## 2023-04-02 RX ADMIN — OXYCODONE HYDROCHLORIDE AND ACETAMINOPHEN 1 TABLET: 5; 325 TABLET ORAL at 06:04

## 2023-04-02 RX ADMIN — POTASSIUM CHLORIDE 20 MEQ: 1500 TABLET, EXTENDED RELEASE ORAL at 05:04

## 2023-04-02 RX ADMIN — WARFARIN SODIUM 4.5 MG: 2.5 TABLET ORAL at 05:04

## 2023-04-02 RX ADMIN — MILRINONE LACTATE IN DEXTROSE 0.25 MCG/KG/MIN: 200 INJECTION, SOLUTION INTRAVENOUS at 12:04

## 2023-04-02 RX ADMIN — POTASSIUM CHLORIDE 40 MEQ: 1500 TABLET, EXTENDED RELEASE ORAL at 02:04

## 2023-04-02 RX ADMIN — LEVETIRACETAM 1000 MG: 500 TABLET, FILM COATED ORAL at 09:04

## 2023-04-02 RX ADMIN — FUROSEMIDE 80 MG: 10 INJECTION, SOLUTION INTRAMUSCULAR; INTRAVENOUS at 12:04

## 2023-04-02 RX ADMIN — INSULIN DETEMIR 15 UNITS: 100 INJECTION, SOLUTION SUBCUTANEOUS at 09:04

## 2023-04-02 RX ADMIN — GABAPENTIN 100 MG: 100 CAPSULE ORAL at 09:04

## 2023-04-02 RX ADMIN — MAGNESIUM SULFATE HEPTAHYDRATE 2 G: 40 INJECTION, SOLUTION INTRAVENOUS at 05:04

## 2023-04-02 NOTE — ASSESSMENT & PLAN NOTE
Endocrinology consulted for BG management.   BG goal 140-180    30% reduction in home regimen  - Levemir (Insulin Detemir) 15 units BID  - Novolog (Insulin Aspart) 10 units TIDWM and prn for BG excursions [facility] SSI (150/25)  - BG checks //0200  - Hypoglycemia protocol in place    ** Please notify Endocrine for any change and/or advance in diet**  ** Please call Endocrine for any BG related issues **    Discharge Planning:   TBD. Please notify endocrinology prior to discharge.

## 2023-04-02 NOTE — ASSESSMENT & PLAN NOTE
-HeartMate 3 Implanted on 6/29/2022 as DT  -Continue Coumadin, Goal INR 2.0-3.0. Subherapeutic today: 1.7.   -Antiplatelets: INV Aspirin (Alon Trial)  -LDH is stable overall today. Check LDH today. Will continue to monitor daily.  -Speed set at 5100, LSL 4700 rpm    Procedure: Device Interrogation Including analysis of device parameters  Current Settings: Ventricular Assist Device  Review of device function is stable/unstable stable    TXP LVAD INTERROGATIONS 4/2/2023 2/2/2023 12/16/2022 12/16/2022 12/15/2022 12/15/2022 12/15/2022   Type - - HeartMate3 HeartMate3 HeartMate3 HeartMate3 -   Flow 3.5 - 3.6 3.7 3.9 3.6 -   Speed 5100 - 5100 5100 5100 5100 -   PI 7.9 - 7.7 7.1 6.2 7.7 -   Power (Serna) 2.7 - 3.8 3.8 3.9 3.9 -   LSL 4700 - 4700 4700 4700 - -   Low Flow Alarm - - - - - - -   High Power Alarm - - - - - - -   Pulsatility - No Pulse Intermittent pulse Intermittent pulse - - No Pulse

## 2023-04-02 NOTE — SUBJECTIVE & OBJECTIVE
Past Medical History:   Diagnosis Date    Arthritis     Cardiomyopathy     CHF (congestive heart failure) 10/01/2020    Diabetes mellitus     Dilated cardiomyopathy 10/26/2020    Drug abuse 10/2020    Hyperosmolar hyperglycemic state (HHS) 5/25/2022    ICD (implantable cardioverter-defibrillator) in place 10/26/2020    Muscle cramping 6/15/2022    Renal disorder        Past Surgical History:   Procedure Laterality Date    APPLICATION OF WOUND VACUUM-ASSISTED CLOSURE DEVICE N/A 6/30/2022    Procedure: APPLICATION, WOUND VAC;  Surgeon: Luis F Paige MD;  Location: [facility] OR [address] [place] [address];  Service: Cardiovascular;  Laterality: N/A;  50 x 5 cm    CARDIAC DEFIBRILLATOR PLACEMENT      IMPLANTATION OF RIGHT VENTRICULAR ASSIST DEVICE (RVAD) N/A 6/29/2022    Procedure: INSERTION, RVAD;  Surgeon: Luis F Paige MD;  [facility];  Service: Cardiovascular;  Laterality: N/A;    INSERTION OF GRAFT TO PERICARDIUM Right 6/30/2022    Procedure: INSERTION-RIGHT VENTRICULAR ASSIST DEVICE;  Surgeon: Luis F Paige MD;  [address];  Service: Cardiovascular;  Laterality: Right;    IRRIGATION OF MEDIASTINUM  6/30/2022    Procedure: IRRIGATION, MEDIASTINUM;  Surgeon: Luis F Paige MD;  [facility];  Service: Cardiovascular;;    LEFT VENTRICULAR ASSIST DEVICE Left 6/23/2022    Procedure: INSERTION-LEFT VENTRICULAR ASSIST DEVICE;  Surgeon: Luis F Paige MD;  [facility];  Service: Cardiovascular;  Laterality: Left;    LEFT VENTRICULAR ASSIST DEVICE N/A 6/29/2022    Procedure: INSERTION-LEFT VENTRICULAR ASSIST DEVICE;  Surgeon: Luis F Paige MD;  [facility];  Service: Cardiovascular;  Laterality: N/A;    RIGHT HEART CATHETERIZATION Right 4/8/2022    Procedure: INSERTION, CATHETER, RIGHT HEART;  Surgeon: Luca Lopez Jr., MD;  Location: [facility] CATH LAB;  Service: Cardiology;  Laterality: Right;    RIGHT HEART CATHETERIZATION Right 4/19/2022    Procedure: INSERTION,  CATHETER, RIGHT HEART;  Surgeon: Josh Pulido MD;  Location: [facility] CATH LAB;  Service: Cardiology;  Laterality: Right;    RIGHT HEART CATHETERIZATION Right 2022    Procedure: INSERTION, CATHETER, RIGHT HEART;  Surgeon: Dalia Crum MD;  Location: [facility] CATH LAB;  Service: Cardiology;  Laterality: Right;    RIGHT HEART CATHETERIZATION Right 10/31/2022    Procedure: INSERTION, CATHETER, RIGHT HEART;  Surgeon: Dalia Crum MD;  Location: [facility] CATH LAB;  Service: Cardiology;  Laterality: Right;    STERNAL WOUND CLOSURE N/A 2022    Procedure: CLOSURE, WOUND, STERNUM;  Surgeon: Luis F Paige MD;  [facility];  Service: Cardiovascular;  Laterality: N/A;       Review of patient's allergies indicates:   Allergen Reactions    Aspirin Other (See Comments)     Mr. Thacker is enrolled in Dr. Paige's Alon Trial and cannot have any aspirin and/or aspirin-containing products. DO NOT cancel any orders for INV Aspirin 100 mg/Placebo. If you have questions, please contact Isabel @ [phone], [phone],[email or web address], secure chat or MS Teams message.    Bumex [bumetanide] Hives    Lactose Diarrhea     Other reaction(s): Abdominal distension, gaseous    Torsemide Hives       Current Facility-Administered Medications   Medication    famotidine tablet 40 mg    gabapentin capsule 100 mg    INV ASPIRIN 100MG/PLACEBO 100 mg    levETIRAcetam tablet 1,000 mg    milrinone 20mg in D5W 100 mL infusion    [START ON 4/3/2023] spironolactone tablet 25 mg    warfarin tablet 4.5 mg     Family History       Problem Relation (Age of Onset)    Diverticulosis Brother    Heart attack Maternal Grandmother, Maternal Grandfather    Heart failure Father          Tobacco Use    Smoking status: Former     Packs/day: 0.50     Years: 16.00     Pack years: 8.00     Types: Cigarettes     Start date: 10/1/2004     Quit date: 2021     Years since quittin.9    Smokeless tobacco: Never   Substance and Sexual  Activity    Alcohol use: Not Currently    Drug use: Not Currently     Types: Marijuana, MDMA (Ecstacy)    Sexual activity: Yes     Partners: Female     Birth control/protection: None     Objective:     Vital Signs (Most Recent):  Temp: 97.7 °F (36.5 °C) (04/02/23 1122)  Pulse: 106 (04/02/23 1122)  Resp: 20 (04/02/23 1122)  BP: 128/80 (04/02/23 1122)  SpO2: 99 % (04/02/23 1122) Vital Signs (24h Range):  Temp:  [97.7 °F (36.5 °C)-98.4 °F (36.9 °C)] 97.7 °F (36.5 °C)  Pulse:  [] 106  Resp:  [16-20] 20  SpO2:  [99 %-100 %] 99 %  BP: (128)/(80) 128/80     Patient Vitals for the past 72 hrs (Last 3 readings):   Weight   04/02/23 1108 107 kg (235 lb 14.3 oz)     Body mass index is 29.48 kg/m².    No intake or output data in the 24 hours ending 04/02/23 1143    Physical Exam  Constitutional:       Appearance: Normal appearance.   Eyes:      Conjunctiva/sclera: Conjunctivae normal.   Cardiovascular:      Rate and Rhythm: Normal rate.      Comments: Elevated JVP  VAD Hum  Pulmonary:      Effort: Pulmonary effort is normal.      Breath sounds: Normal breath sounds. No wheezing or rales.   Abdominal:      General: There is no distension.      Palpations: Abdomen is soft.      Tenderness: There is no abdominal tenderness.   Musculoskeletal:         General: No swelling.      Right lower leg: No edema.      Left lower leg: No edema.   Skin:     General: Skin is warm.   Neurological:      General: No focal deficit present.      Mental Status: He is alert.   Psychiatric:         Mood and Affect: Mood normal.       Significant Labs:  CBC:  Recent Labs   Lab 04/02/23  0515   WBC 9.3   RBC 4.25*   HGB 11.0*   HCT 34.0*      MCV 80.0   MCH 25.9*   MCHC 32.4*     BNP:  Recent Labs   Lab 04/02/23  0515   .4*     CMP:  Recent Labs   Lab 04/02/23  0515   CALCIUM 9.1   ALBUMIN 3.7      K 4.1   CO2 27   BUN 9.1   CREATININE 1.59*   ALKPHOS 92   ALT 15   AST 21   BILITOT 1.7*      Coagulation:   Recent Labs   Lab  03/28/23  0000 04/02/23  0515   INR 1.8 1.75*     LDH:  No results for input(s): LDH in the last 72 hours.  Microbiology:  Microbiology Results (last 7 days)       ** No results found for the last 168 hours. **          I have reviewed all pertinent labs within the past 24 hours.    Diagnostic Results:  I have reviewed and interpreted all pertinent imaging results/findings within the past 24 hours.    RHC (10/2022): Milrinone 0.125 & HM3 at 5100  RA: 21/ 20/ 18 RV: 35/ 7/ 22 PA: 35/ 19/ 24 PWP: 22/ 22/ 21 .   Cardiac output was 3.55  by Naomi. Cardiac index is 1.6 L/min/m2.   O2 Sat: PA 54%.

## 2023-04-02 NOTE — SUBJECTIVE & OBJECTIVE
Interval HPI:   Overnight events: No acute events overnight. Patient in room 302/302 A. Blood glucose stable. BG at goal on current insulin regimen (SSI, prandial, and basal insulin ). Steroid use- None.    Renal function- Normal   Vasopressors-  None       Endocrine will continue to follow and manage insulin orders inpatient.         Diet Cardiac     Eatin%  Nausea: No  Hypoglycemia and intervention: No  Fever: No  TPN and/or TF: No    PMH, PSH, FH, SH updated and reviewed     ROS:  Review of Systems  Constitutional: Negative for weight changes.  Eyes: Negative for visual disturbance.  Respiratory: Negative for cough.   Cardiovascular: Negative for chest pain.  Gastrointestinal: Negative for nausea.  Endocrine: Negative for polyuria, polydipsia.  Musculoskeletal: Negative for back pain.  Skin: Negative for rash.  Neurological: Negative for syncope.  Psychiatric/Behavioral: Negative for depression.    Current Medications and/or Treatments Impacting Glycemic Control  Immunotherapy:    Immunosuppressants       None          Steroids:   Hormones (From admission, onward)      None          Pressors:    Autonomic Drugs (From admission, onward)      None          Hyperglycemia/Diabetes Medications:   Antihyperglycemics (From admission, onward)      Start     Stop Route Frequency Ordered    23 2100  insulin detemir U-100 (Levemir) pen 15 Units         -- SubQ 2 times daily 23 1152    23 1645  insulin aspart U-100 pen 5 Units         -- SubQ 3 times daily with meals 23 1152             PHYSICAL EXAMINATION:  Vitals:    23 1228   BP:    Pulse: 90   Resp: 14   Temp:      Body mass index is 29.84 kg/m².    Physical Exam  Constitutional: Well developed, well nourished, NAD.  ENT: External ears no masses with nose patent; normal hearing.  Neck: Supple; trachea midline.  Cardiovascular: Normal heart sounds, no LE edema. DP +2 bilaterally.  Lungs: Normal effort; lungs anterior bilaterally clear  to auscultation.  Abdomen: Soft, no masses, no hernias.  MS: No clubbing or cyanosis of nails noted; unable to assess gait.  Skin: No rashes, lesions, or ulcers; no nodules. Injection sites are ok. No lipo hypertropthy or atrophy.  Psychiatric: Good judgement and insight; normal mood and affect.  Neurological: Cranial nerves are grossly intact.   Foot: Nails in good condition, no amputations noted.

## 2023-04-02 NOTE — HPI
Reason for Consult: Management of T2DM, Hyperglycemia      Surgical Procedure and Date: LVAD 06/29/2022     Diabetes diagnosis year: 2022     Home Diabetes Medications:   -Levemir 22 units BID.   -Novolog 16 units TIDWM in addition to the following correction scale:     150 - 200 + 1 unit     201 - 250 + 2 units     251 - 300 + 3 units     301 - 350 + 4 units      > 350   + 5 units     How often checking glucose at home?  Uses Freestyle William    BG readings on regimen: 200 or higher and occasionally had just reading of HIGH   Hypoglycemia on the regimen?  No  Missed doses on regimen?  Yes, occasionally skips breakfast do to sleeping in and will skip Novolog with breakfast      Diabetes Complications include:     Hyperglycemia     Complicating diabetes co morbidities:   History of MI, CHF, and CKD        HPI: 38 yo black male with stage d HFrEF, NICM, Familial CM (Father had LVAD and subsequent heart transplant), h/o PSA, DM who is s/p DT-HM3 implantation 6/23/2022 with early RV failure requiring RVAD. He also has a history of unclear syncope vs seizures and is followed by neuro for this and is on Keppra. Patient presents today as a transfer from [place] after he presented there after he called about running out of his milrinone. Wife was at bedside that states, she has been in contact with everyone but a few weeks ago she had changed her phone number and lost contact at one point with someone in infusion. Patient overall denies any complaints, celebrated his birthday yesterday. He was lat seen in clinic with Dr. Villatoro on 3/23. Reports compliance with all his medication. He does however, report that he takes his lasix but not 80 mg, daily he started taking 40 mg, daily and at times he wouldn't because he feels dry. Last took his Lasix Friday. Patient denies any fever, chills, nausea, vomiting, hematemesis, cough, chest pain, palpitations, shortness of breath, abdominal pain/distension, changes in bowel or urinary  habits, no hematochezia or melena. Endocrine consulted to manage hyperglycemia and type 2 diabetes.     Lab Results   Component Value Date    HGBA1C 12.2 (H) 12/16/2022

## 2023-04-02 NOTE — CONSULTS
[facility] - [facility] Step[facility]  Endocrinology  Diabetes Consult Note    Consult Requested by: Marol Marques MD   Reason for admit: Acute on chronic combined systolic and diastolic heart failure    HISTORY OF PRESENT ILLNESS:  Reason for Consult: Management of T2DM, Hyperglycemia      Surgical Procedure and Date: LVAD 06/29/2022     Diabetes diagnosis year: 2022     Home Diabetes Medications:   -Levemir 22 units BID.   -Novolog 16 units TIDWM in addition to the following correction scale:     150 - 200 + 1 unit     201 - 250 + 2 units     251 - 300 + 3 units     301 - 350 + 4 units      > 350   + 5 units     How often checking glucose at home?  Uses Freestyle William    BG readings on regimen: 200 or higher and occasionally had just reading of HIGH   Hypoglycemia on the regimen?  No  Missed doses on regimen?  Yes, occasionally skips breakfast do to sleeping in and will skip Novolog with breakfast      Diabetes Complications include:     Hyperglycemia     Complicating diabetes co morbidities:   History of MI, CHF, and CKD        HPI: 38 yo black male with stage d HFrEF, NICM, Familial CM (Father had LVAD and subsequent heart transplant), h/o PSA, DM who is s/p DT-HM3 implantation 6/23/2022 with early RV failure requiring RVAD. He also has a history of unclear syncope vs seizures and is followed by neuro for this and is on Keppra. Patient presents today as a transfer from [place] after he presented there after he called about running out of his milrinone. Wife was at bedside that states, she has been in contact with everyone but a few weeks ago she had changed her phone number and lost contact at one point with someone in infusion. Patient overall denies any complaints, celebrated his birthday yesterday. He was lat seen in clinic with Dr. Villatoro on 3/23. Reports compliance with all his medication. He does however, report that he takes his lasix but not 80 mg, daily he started taking 40 mg, daily and at times he wouldn't  because he feels dry. Last took his Lasix Friday. Patient denies any fever, chills, nausea, vomiting, hematemesis, cough, chest pain, palpitations, shortness of breath, abdominal pain/distension, changes in bowel or urinary habits, no hematochezia or melena. Endocrine consulted to manage hyperglycemia and type 2 diabetes.     Lab Results   Component Value Date    HGBA1C 12.2 (H) 2022              Interval HPI:   Overnight events: No acute events overnight. Patient in room 302/302 A. Blood glucose stable. BG at goal on current insulin regimen (SSI, prandial, and basal insulin ). Steroid use- None.    Renal function- Normal   Vasopressors-  None       Endocrine will continue to follow and manage insulin orders inpatient.         Diet Cardiac     Eatin%  Nausea: No  Hypoglycemia and intervention: No  Fever: No  TPN and/or TF: No    PMH, PSH, FH, SH updated and reviewed     ROS:  Review of Systems  Constitutional: Negative for weight changes.  Eyes: Negative for visual disturbance.  Respiratory: Negative for cough.   Cardiovascular: Negative for chest pain.  Gastrointestinal: Negative for nausea.  Endocrine: Negative for polyuria, polydipsia.  Musculoskeletal: Negative for back pain.  Skin: Negative for rash.  Neurological: Negative for syncope.  Psychiatric/Behavioral: Negative for depression.    Current Medications and/or Treatments Impacting Glycemic Control  Immunotherapy:    Immunosuppressants       None          Steroids:   Hormones (From admission, onward)      None          Pressors:    Autonomic Drugs (From admission, onward)      None          Hyperglycemia/Diabetes Medications:   Antihyperglycemics (From admission, onward)      Start     Stop Route Frequency Ordered    23 2100  insulin detemir U-100 (Levemir) pen 15 Units         -- SubQ 2 times daily 23 1152    23 1645  insulin aspart U-100 pen 5 Units         -- SubQ 3 times daily with meals 23 1152             PHYSICAL  EXAMINATION:  Vitals:    04/02/23 1228   BP:    Pulse: 90   Resp: 14   Temp:      Body mass index is 29.84 kg/m².    Physical Exam  Constitutional: Well developed, well nourished, NAD.  ENT: External ears no masses with nose patent; normal hearing.  Neck: Supple; trachea midline.  Cardiovascular: Normal heart sounds, no LE edema. DP +2 bilaterally.  Lungs: Normal effort; lungs anterior bilaterally clear to auscultation.  Abdomen: Soft, no masses, no hernias.  MS: No clubbing or cyanosis of nails noted; unable to assess gait.  Skin: No rashes, lesions, or ulcers; no nodules. Injection sites are ok. No lipo hypertropthy or atrophy.  Psychiatric: Good judgement and insight; normal mood and affect.  Neurological: Cranial nerves are grossly intact.   Foot: Nails in good condition, no amputations noted.        Labs Reviewed and Include   Recent Labs   Lab 04/02/23  1151   *   CALCIUM 8.9   ALBUMIN 3.7   PROT 7.5      K 3.4*   CO2 24      BUN 10   CREATININE 1.3   ALKPHOS 101   ALT 15   AST 22   BILITOT 1.2*     Lab Results   Component Value Date    WBC 9.31 04/02/2023    HGB 10.8 (L) 04/02/2023    HCT 34.9 (L) 04/02/2023    MCV 83 04/02/2023     04/02/2023     No results for input(s): TSH, FREET4 in the last 168 hours.  Lab Results   Component Value Date    HGBA1C 12.2 (H) 12/16/2022       Nutritional status:   Body mass index is 29.84 kg/m².  Lab Results   Component Value Date    ALBUMIN 3.7 04/02/2023    ALBUMIN 3.7 04/02/2023    ALBUMIN 4.2 03/23/2023     Lab Results   Component Value Date    PREALBUMIN 21 03/23/2023    PREALBUMIN 24 02/02/2023    PREALBUMIN 25 12/16/2022       Estimated Creatinine Clearance: 102.4 mL/min (based on SCr of 1.3 mg/dL).    Accu-Checks  No results for input(s): POCTGLUCOSE in the last 72 hours.     ASSESSMENT and PLAN    Cardiac/Vascular  * Acute on chronic combined systolic and diastolic heart failure  Managed per primary team  Avoid hypoglycemia        LVAD  (left ventricular assist device) present  Managed per primary team  Avoid hypoglycemia        Endocrine  Type 2 diabetes mellitus with hyperglycemia  Endocrinology consulted for BG management.   BG goal 140-180    30% reduction in home regimen  - Levemir (Insulin Detemir) 15 units BID  - Novolog (Insulin Aspart) 10 units TIDWM and prn for BG excursions [facility] SSI (150/25)  - BG checks AC/HS/0200  - Hypoglycemia protocol in place    ** Please notify Endocrine for any change and/or advance in diet**  ** Please call Endocrine for any BG related issues **    Discharge Planning:   TBD. Please notify endocrinology prior to discharge.            Plan discussed with patient, family, and RN at bedside.        Michael Wyman, DNP, FNP  Endocrinology  [facility] - Card[facility] Stepdown

## 2023-04-02 NOTE — LETTER
April 6, 2023         [address]  [place] [address]  Phone: [phone]  Fax: [phone]       Date of Visit: 04/06/2023    To Whom It May Concern:    Please be advised that under state and federal laws as it relates to patient privacy and Health Insurance Accountability Act (HIPAA), we can not release our patient(s) name without authorization. Although, we can confirm that the individual listed below did accompany a person to our facility for healthcare services to be provided.    This document confirms that Agathaomar Kyle accompanied a patient admitted in our facility from 04/02/2023 until 04/06/2023.    Sincerely,     Tigist Beebe MD

## 2023-04-02 NOTE — HPI
36 yo black male with stage d HFrEF, NICM, ? 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 He underwent RVAD removal and chest closure 6/30/2022.  He also completed a course of IV Abx for staph epi. He was weaned off  but he had to restarted due to RVF. He was eventually transitioned to milrinone (secondary to  shortage) now on 0.25 mcg/kg/min. He also has a history of unclear syncope vs seizures and is followed by neuro for this and is on Keppra. Patient presents today as a transfer from [place] after he presented there after he called about running out of his milrinone. Wife was at bedside that states, she has been in contact with everyone but a few weeks ago she had changed her phone number and lost contact at one point with someone in infusion. Patient overall denies any complaints, celebrated his birthday yesterday. He was lat seen in clinic with Dr. Villatoro on 3/23. Reports compliance with all his medication. He does however, report that he takes his lasix but not 80 mg, daily he started taking 40 mg, daily and at times he wouldn't because he feels dry. Last took his Lasix ?Friday. Patient denies any fever, chills, nausea, vomiting, hematemesis, cough, chest pain, palpitations, shortness of breath, abdominal pain/distension, changes in bowel or urinary habits, no hematochezia or melena.

## 2023-04-02 NOTE — H&P
[facility] - Cardiology Stepdown  Heart Transplant  H&P    Patient Name: Kevan Queen  MRN: [id number]  Admission Date: 4/2/2023  Attending Physician: Malro Marques MD  Primary Care Provider: ORALIA Cline  Principal Problem:Acute on chronic combined systolic and diastolic heart failure    Subjective:     History of Present Illness:  36 yo black male with stage d HFrEF, NICM, ? 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 He underwent RVAD removal and chest closure 6/30/2022.  He also completed a course of IV Abx for staph epi. He was weaned off  but he had to restarted due to RVF. He was eventually transitioned to milrinone (secondary to  shortage) now on 0.25 mcg/kg/min. He also has a history of unclear syncope vs seizures and is followed by neuro for this and is on Keppra. Patient presents today as a transfer from [place] after he presented there after he called about running out of his milrinone. Wife was at bedside that states, she has been in contact with everyone but a few weeks ago she had changed her phone number and lost contact at one point with someone in infusion. Patient overall denies any complaints, celebrated his birthday yesterday. He was lat seen in clinic with Dr. Villatoro on 3/23. Reports compliance with all his medication. He does however, report that he takes his lasix but not 80 mg, daily he started taking 40 mg, daily and at times he wouldn't because he feels dry. Last took his Lasix ?Friday. Patient denies any fever, chills, nausea, vomiting, hematemesis, cough, chest pain, palpitations, shortness of breath, abdominal pain/distension, changes in bowel or urinary habits, no hematochezia or melena.      Past Medical History:   Diagnosis Date    Arthritis     Cardiomyopathy     CHF  (congestive heart failure) 10/01/2020    Diabetes mellitus     Dilated cardiomyopathy 10/26/2020    Drug abuse 10/2020    Hyperosmolar hyperglycemic state (HHS) 5/25/2022    ICD (implantable cardioverter-defibrillator) in place 10/26/2020    Muscle cramping 6/15/2022    Renal disorder        Past Surgical History:   Procedure Laterality Date    APPLICATION OF WOUND VACUUM-ASSISTED CLOSURE DEVICE N/A 6/30/2022    Procedure: APPLICATION, WOUND VAC;  Surgeon: Luis F Paige MD;  [facility];  Service: Cardiovascular;  Laterality: N/A;  50 x 5 cm    CARDIAC DEFIBRILLATOR PLACEMENT      IMPLANTATION OF RIGHT VENTRICULAR ASSIST DEVICE (RVAD) N/A 6/29/2022    Procedure: INSERTION, RVAD;  Surgeon: Luis F Paige MD;  [facility];  Service: Cardiovascular;  Laterality: N/A;    INSERTION OF GRAFT TO PERICARDIUM Right 6/30/2022    Procedure: INSERTION-RIGHT VENTRICULAR ASSIST DEVICE;  Surgeon: Luis F Paige MD;  [facility];  Service: Cardiovascular;  Laterality: Right;    IRRIGATION OF MEDIASTINUM  6/30/2022    Procedure: IRRIGATION, MEDIASTINUM;  Surgeon: Luis F Paige MD;  [facility];  Service: Cardiovascular;;    LEFT VENTRICULAR ASSIST DEVICE Left 6/23/2022    Procedure: INSERTION-LEFT VENTRICULAR ASSIST DEVICE;  Surgeon: Luis F Paige MD;  Location: [facility] OR [facility] FLR;  Service: Cardiovascular;  Laterality: Left;    LEFT VENTRICULAR ASSIST DEVICE N/A 6/29/2022    Procedure: INSERTION-LEFT VENTRICULAR ASSIST DEVICE;  Surgeon: Luis F Paige MD;  Location: [facility] OR [facility] FLR;  Service: Cardiovascular;  Laterality: N/A;    RIGHT HEART CATHETERIZATION Right 4/8/2022    Procedure: INSERTION, CATHETER, RIGHT HEART;  Surgeon: Luca Lopez Jr., MD;  Location: [facility] CATH LAB;  Service: Cardiology;  Laterality: Right;    RIGHT HEART CATHETERIZATION Right 4/19/2022    Procedure: INSERTION, CATHETER, RIGHT HEART;  Surgeon: Josh Pulido MD;  Location: [facility] CATH  LAB;  Service: Cardiology;  Laterality: Right;    RIGHT HEART CATHETERIZATION Right 2022    Procedure: INSERTION, CATHETER, RIGHT HEART;  Surgeon: Dalia Crum MD;  Location: [facility] CATH LAB;  Service: Cardiology;  Laterality: Right;    RIGHT HEART CATHETERIZATION Right 10/31/2022    Procedure: INSERTION, CATHETER, RIGHT HEART;  Surgeon: Dalia Crum MD;  Location: [facility] CATH LAB;  Service: Cardiology;  Laterality: Right;    STERNAL WOUND CLOSURE N/A 2022    Procedure: CLOSURE, WOUND, STERNUM;  Surgeon: Luis F Paige MD;  Location: [facility] OR [facility] FLR;  Service: Cardiovascular;  Laterality: N/A;       Review of patient's allergies indicates:   Allergen Reactions    Aspirin Other (See Comments)     Mr. Thacker is enrolled in Dr. Paige's Alon Trial and cannot have any aspirin and/or aspirin-containing products. DO NOT cancel any orders for INV Aspirin 100 mg/Placebo. If you have questions, please contact Isabel @ [phone], [phone],[email or web address], secure chat or MS Teams message.    Bumex [bumetanide] Hives    Lactose Diarrhea     Other reaction(s): Abdominal distension, gaseous    Torsemide Hives       Current Facility-Administered Medications   Medication    famotidine tablet 40 mg    gabapentin capsule 100 mg    INV ASPIRIN 100MG/PLACEBO 100 mg    levETIRAcetam tablet 1,000 mg    milrinone 20mg in D5W 100 mL infusion    [START ON 4/3/2023] spironolactone tablet 25 mg    warfarin tablet 4.5 mg     Family History       Problem Relation (Age of Onset)    Diverticulosis Brother    Heart attack Maternal Grandmother, Maternal Grandfather    Heart failure Father          Tobacco Use    Smoking status: Former     Packs/day: 0.50     Years: 16.00     Pack years: 8.00     Types: Cigarettes     Start date: 10/1/2004     Quit date: 2021     Years since quittin.9    Smokeless tobacco: Never   Substance and Sexual Activity    Alcohol use: Not Currently    Drug use: Not Currently     Types:  Marijuana, MDMA (Ecstacy)    Sexual activity: Yes     Partners: Female     Birth control/protection: None     Objective:     Vital Signs (Most Recent):  Temp: 97.7 °F (36.5 °C) (04/02/23 1122)  Pulse: 106 (04/02/23 1122)  Resp: 20 (04/02/23 1122)  BP: 128/80 (04/02/23 1122)  SpO2: 99 % (04/02/23 1122) Vital Signs (24h Range):  Temp:  [97.7 °F (36.5 °C)-98.4 °F (36.9 °C)] 97.7 °F (36.5 °C)  Pulse:  [] 106  Resp:  [16-20] 20  SpO2:  [99 %-100 %] 99 %  BP: (128)/(80) 128/80     Patient Vitals for the past 72 hrs (Last 3 readings):   Weight   04/02/23 1108 107 kg (235 lb 14.3 oz)     Body mass index is 29.48 kg/m².    No intake or output data in the 24 hours ending 04/02/23 1143    Physical Exam  Constitutional:       Appearance: Normal appearance.   Eyes:      Conjunctiva/sclera: Conjunctivae normal.   Cardiovascular:      Rate and Rhythm: Normal rate.      Comments: Elevated JVP  VAD Hum  Pulmonary:      Effort: Pulmonary effort is normal.      Breath sounds: Normal breath sounds. No wheezing or rales.   Abdominal:      General: There is no distension.      Palpations: Abdomen is soft.      Tenderness: There is no abdominal tenderness.   Musculoskeletal:         General: No swelling.      Right lower leg: No edema.      Left lower leg: No edema.   Skin:     General: Skin is warm.   Neurological:      General: No focal deficit present.      Mental Status: He is alert.   Psychiatric:         Mood and Affect: Mood normal.       Significant Labs:  CBC:  Recent Labs   Lab 04/02/23  0515   WBC 9.3   RBC 4.25*   HGB 11.0*   HCT 34.0*      MCV 80.0   MCH 25.9*   MCHC 32.4*     BNP:  Recent Labs   Lab 04/02/23  0515   .4*     CMP:  Recent Labs   Lab 04/02/23  0515   CALCIUM 9.1   ALBUMIN 3.7      K 4.1   CO2 27   BUN 9.1   CREATININE 1.59*   ALKPHOS 92   ALT 15   AST 21   BILITOT 1.7*      Coagulation:   Recent Labs   Lab 03/28/23  0000 04/02/23  0515   INR 1.8 1.75*     LDH:  No results for input(s):  LDH in the last 72 hours.  Microbiology:  Microbiology Results (last 7 days)       ** No results found for the last 168 hours. **          I have reviewed all pertinent labs within the past 24 hours.    Diagnostic Results:  I have reviewed and interpreted all pertinent imaging results/findings within the past 24 hours.    RHC (10/2022): Milrinone 0.125 & HM3 at 5100  RA: 21/ 20/ 18 RV: 35/ 7/ 22 PA: 35/ 19/ 24 PWP: 22/ 22/ 21 .   Cardiac output was 3.55  by Naomi. Cardiac index is 1.6 L/min/m2.   O2 Sat: PA 54%.   Assessment/Plan:     * Acute on chronic combined systolic and diastolic heart failure  - NICM s/p HM3-DT (6/2022)  - Continue with Milrinone 0.25 and check hemoydnamics  - Repeat Echocardiogram  - Home Diuresis: Lasix 40 mg, daily (per patient)  - Patient appears hypervolemic on exam with elevated JVP; will give Lasix IVP 80 mg x 1 and re-assess. CVP checked from PICC and was noted to be 18.   - Monitor electrolytes closely. Maintain Mg >2 and K >4   - HF Pathway: Sodium restriction <2 g, Fluid restriction < 1500 mL, Strict I/Os, Daily weights    LVAD (left ventricular assist device) present  -HeartMate 3 Implanted on 6/29/2022 as DT  -Continue Coumadin, Goal INR 2.0-3.0. Subherapeutic today: 1.7.   -Antiplatelets: INV Aspirin (Alon Trial)  -LDH is stable overall today. Check LDH today. Will continue to monitor daily.  -Speed set at 5100, LSL 4700 rpm    Procedure: Device Interrogation Including analysis of device parameters  Current Settings: Ventricular Assist Device  Review of device function is stable/unstable stable    TXP LVAD INTERROGATIONS 4/2/2023 2/2/2023 12/16/2022 12/16/2022 12/15/2022 12/15/2022 12/15/2022   Type - - HeartMate3 HeartMate3 HeartMate3 HeartMate3 -   Flow 3.5 - 3.6 3.7 3.9 3.6 -   Speed 5100 - 5100 5100 5100 5100 -   PI 7.9 - 7.7 7.1 6.2 7.7 -   Power (Serna) 2.7 - 3.8 3.8 3.9 3.9 -   LSL 4700 - 4700 4700 4700 - -   Low Flow Alarm - - - - - - -   High Power Alarm - - - - - - -    Pulsatility - No Pulse Intermittent pulse Intermittent pulse - - No Pulse       Type 2 diabetes mellitus with hyperglycemia  - Last A1c: 9.2, Repeat A1c  - Home Regimen: Detemir 22 units BID; Aspart 14 TID  - Will start Levemir 15 units, BID and Aspart 5 TID and up-titrate while inpatient  - Endocrinology Consult    Temporal lobe seizure  - Resume Keppra  - Seizure precautions      Radha Vann MD  Heart Transplant  [facility] - Cardiology Stepdown

## 2023-04-02 NOTE — NURSING
Patient c/o cramps and now Right flank pain. BMP and mag leve checked. Potassium and mg replaced. MD notified and aware

## 2023-04-02 NOTE — ED PROVIDER NOTES
Encounter Date: 4/2/2023    SCRIBE #1 NOTE: I, Poli Saravia, am scribing for, and in the presence of,  Elba Dominguez DO. I have scribed the following portions of the note - Other sections scribed: HPI,ROS,PE.     History     Chief Complaint   Patient presents with    Medication Refill     Pt is a Heart failure pt on the transplant list with an LVAD. Pt is running out of his primacor infusion and his transplant doctor told him to come to the ER with advise from the LVAD rep. About 200 ml left in his bag.      39 y/o male with a PMHx of CHF w/ ICD, Cardiomyopathy, and DM presents to ED for medication refill. Pt is on the transplant list with an LVAD. Pt states he was running low on his primacor infusion and didn't get a shipment this week. He denies any SOB, chest pain or leg swelling.     The history is provided by the patient. No  was used.   Medication Refill  This is a new problem. Pertinent negatives include no chest pain and no shortness of breath.   Review of patient's allergies indicates:   Allergen Reactions    Aspirin Other (See Comments)     Mr. Thacker is enrolled in Dr. Paige's Alon Trial and cannot have any aspirin and/or aspirin-containing products. DO NOT cancel any orders for INV Aspirin 100 mg/Placebo. If you have questions, please contact Isabel @ [phone], [phone],[email or web address], secure chat or MS Teams message.    Bumex [bumetanide] Hives    Lactose Diarrhea     Other reaction(s): Abdominal distension, gaseous    Torsemide Hives     Past Medical History:   Diagnosis Date    Arthritis     Cardiomyopathy     CHF (congestive heart failure) 10/01/2020    Diabetes mellitus     Dilated cardiomyopathy 10/26/2020    Drug abuse 10/2020    Hyperosmolar hyperglycemic state (HHS) 5/25/2022    ICD (implantable cardioverter-defibrillator) in place 10/26/2020    Muscle cramping 6/15/2022    Renal disorder      Past Surgical History:   Procedure Laterality Date    APPLICATION  OF WOUND VACUUM-ASSISTED CLOSURE DEVICE N/A 6/30/2022    Procedure: APPLICATION, WOUND VAC;  Surgeon: Luis F Paige MD;  Location: [facility] OR 2ND FLR;  Service: Cardiovascular;  Laterality: N/A;  50 x 5 cm    CARDIAC DEFIBRILLATOR PLACEMENT      IMPLANTATION OF RIGHT VENTRICULAR ASSIST DEVICE (RVAD) N/A 6/29/2022    Procedure: INSERTION, RVAD;  Surgeon: Luis F Paige MD;  Location: [facility] OR 2ND FLR;  Service: Cardiovascular;  Laterality: N/A;    INSERTION OF GRAFT TO PERICARDIUM Right 6/30/2022    Procedure: INSERTION-RIGHT VENTRICULAR ASSIST DEVICE;  Surgeon: Luis F Paige MD;  Location: [facility] OR 2ND FLR;  Service: Cardiovascular;  Laterality: Right;    IRRIGATION OF MEDIASTINUM  6/30/2022    Procedure: IRRIGATION, MEDIASTINUM;  Surgeon: Luis F Paige MD;  Location: [facility] OR 2ND FLR;  Service: Cardiovascular;;    LEFT VENTRICULAR ASSIST DEVICE Left 6/23/2022    Procedure: INSERTION-LEFT VENTRICULAR ASSIST DEVICE;  Surgeon: Luis F Paige MD;  Location: [facility] OR 2ND FLR;  Service: Cardiovascular;  Laterality: Left;    LEFT VENTRICULAR ASSIST DEVICE N/A 6/29/2022    Procedure: INSERTION-LEFT VENTRICULAR ASSIST DEVICE;  Surgeon: Luis F Paige MD;  Location: [facility] OR 2ND FLR;  Service: Cardiovascular;  Laterality: N/A;    RIGHT HEART CATHETERIZATION Right 4/8/2022    Procedure: INSERTION, CATHETER, RIGHT HEART;  Surgeon: Luca Lopez Jr., MD;  Location: [facility] CATH LAB;  Service: Cardiology;  Laterality: Right;    RIGHT HEART CATHETERIZATION Right 4/19/2022    Procedure: INSERTION, CATHETER, RIGHT HEART;  Surgeon: Josh Pulido MD;  Location: [facility] CATH LAB;  Service: Cardiology;  Laterality: Right;    RIGHT HEART CATHETERIZATION Right 7/21/2022    Procedure: INSERTION, CATHETER, RIGHT HEART;  Surgeon: Dalia Crum MD;  Location: [facility] CATH LAB;  Service: Cardiology;  Laterality: Right;    RIGHT HEART CATHETERIZATION Right 10/31/2022    Procedure: INSERTION, CATHETER, RIGHT HEART;  Surgeon: Dalia Crum  MD;  Location: [facility] CATH LAB;  Service: Cardiology;  Laterality: Right;    STERNAL WOUND CLOSURE N/A 2022    Procedure: CLOSURE, WOUND, STERNUM;  Surgeon: Luis F Paige MD;  Location: [facility] OR [address] [place] [address];  Service: Cardiovascular;  Laterality: N/A;     Family History   Problem Relation Age of Onset    Heart failure Father     Diverticulosis Brother     Heart attack Maternal Grandmother     Heart attack Maternal Grandfather      Social History     Tobacco Use    Smoking status: Former     Packs/day: 0.50     Years: 16.00     Pack years: 8.00     Types: Cigarettes     Start date: 10/1/2004     Quit date: 2021     Years since quittin.9    Smokeless tobacco: Never   Substance Use Topics    Alcohol use: Not Currently    Drug use: Not Currently     Types: Marijuana, MDMA (Ecstacy)     Review of Systems   Respiratory:  Negative for shortness of breath.    Cardiovascular:  Negative for chest pain.     Physical Exam     Initial Vitals [23 0336]   BP Pulse Resp Temp SpO2   -- 89 19 98.3 °F (36.8 °C) 100 %      MAP       --         Physical Exam    Nursing note and vitals reviewed.  Constitutional: He appears well-developed and well-nourished. He is not diaphoretic. No distress.   HENT:   Head: Normocephalic and atraumatic.   Right Ear: External ear normal.   Left Ear: External ear normal.   Eyes: Conjunctivae and EOM are normal. Pupils are equal, round, and reactive to light.   Neck: Neck supple.   Normal range of motion.  Cardiovascular:            LVAD   Pulmonary/Chest: No respiratory distress.   Abdominal: He exhibits no distension.   Musculoskeletal:         General: No edema. Normal range of motion.      Cervical back: Normal range of motion and neck supple.     Neurological: He is alert and oriented to person, place, and time. He has normal strength. GCS score is 15. GCS eye subscore is 4. GCS verbal subscore is 5. GCS motor subscore is 6.   Skin: Skin is warm and dry.   Psychiatric: He has a normal  mood and affect.       ED Course   Procedures  Labs Reviewed   COMPREHENSIVE METABOLIC PANEL - Abnormal; Notable for the following components:       Result Value    Glucose Level 115 (*)     Creatinine 1.59 (*)     Bilirubin Total 1.7 (*)     All other components within normal limits   TROPONIN I - Abnormal; Notable for the following components:    Troponin-I 0.121 (*)     All other components within normal limits   B-TYPE NATRIURETIC PEPTIDE - Abnormal; Notable for the following components:    Natriuretic Peptide 422.4 (*)     All other components within normal limits   PROTIME-INR - Abnormal; Notable for the following components:    PT 20.2 (*)     INR 1.75 (*)     All other components within normal limits   CBC WITH DIFFERENTIAL - Abnormal; Notable for the following components:    RBC 4.25 (*)     Hgb 11.0 (*)     Hct 34.0 (*)     MCH 25.9 (*)     MCHC 32.4 (*)     All other components within normal limits   APTT - Normal   CBC W/ AUTO DIFFERENTIAL    Narrative:     The following orders were created for panel order CBC auto differential.  Procedure                               Abnormality         Status                     ---------                               -----------         ------                     CBC with Differential[[id number]]        Abnormal            Final result                 Please view results for these tests on the individual orders.          Imaging Results    None          Medications - No data to display     Medical Decision Making  39 yo male LVAD patient here due to almost running out of Milrinone infusion. No complaints    ED management:  Ordered milrinone infusion at current rate, hospital tubing not compatible with patient's device so placed on hospital pump     Consults:  LVAD coordinator- transfer patient to [facility] main campus, patient will have to be admitted until they make a plan for outpatient Milrinone   Spoke with accepting Dr Jerald ROSEN     Problems Addressed:  Acute on  chronic heart failure, unspecified heart failure type: chronic illness or injury with exacerbation, progression, or side effects of treatment  LVAD (left ventricular assist device) present: chronic illness or injury with exacerbation, progression, or side effects of treatment    Amount and/or Complexity of Data Reviewed  External Data Reviewed: notes.     Details: reviewed telephone encouters- was dropped by home health due to non-compliance  Labs: ordered. Decision-making details documented in ED Course.    Risk  Prescription drug management.  Decision regarding hospitalization.             Scribe Attestation:   Scribe #1: I performed the above scribed service and the documentation accurately describes the services I performed. I attest to the accuracy of the note.    Attending Attestation:           Physician Attestation for Scribe:  Physician Attestation Statement for Scribe #1: I, Elba Dominguez, DO, reviewed documentation, as scribed by Poli Saravia in my presence, and it is both accurate and complete.           ED Course as of 04/03/23 0008   Sun Apr 02, 2023   2768 Paged LVAD coordinator  [DP]   5260 Spoke with [facility] LVAD coordinator who states that Home Health has dropped patient due to non-compliance so we will need to start Milrinone drip and have patient transferred to their facility for continuous infusion and to arrange outpatient care    [KM]      ED Course User Index  [DP] Zoya Billings  [KM] Elba Dominguez MD                 Clinical Impression:   Final diagnoses:  [Z95.811] LVAD (left ventricular assist device) present  [I50.9] Acute on chronic heart failure, unspecified heart failure type (Primary)        ED Disposition Condition    Transfer to Another Facility Stable                Elba Dominguez MD  04/03/23 0011

## 2023-04-02 NOTE — ASSESSMENT & PLAN NOTE
- Last A1c: 9.2, Repeat A1c  - Home Regimen: Detemir 22 units BID; Aspart 14 TID  - Will start Levemir 15 units, BID and Aspart 5 TID and up-titrate while inpatient  - Endocrinology Consult

## 2023-04-02 NOTE — ASSESSMENT & PLAN NOTE
- NICM s/p HM3-DT (6/2022)  - Continue with Milrinone 0.25 and check hemoydnamics  - Repeat Echocardiogram  - Home Diuresis: Lasix 40 mg, daily (per patient)  - Patient appears hypervolemic on exam with elevated JVP; will give Lasix IVP 80 mg x 1 and re-assess. CVP checked from PICC and was noted to be 18.   - Monitor electrolytes closely. Maintain Mg >2 and K >4   - HF Pathway: Sodium restriction <2 g, Fluid restriction < 1500 mL, Strict I/Os, Daily weights

## 2023-04-03 LAB
ALBUMIN SERPL BCP-MCNC: 3.7 G/DL (ref 3.5–5.2)
ALLENS TEST: ABNORMAL
ALLENS TEST: ABNORMAL
ALP SERPL-CCNC: 107 U/L (ref 55–135)
ALT SERPL W/O P-5'-P-CCNC: 16 U/L (ref 10–44)
ANION GAP SERPL CALC-SCNC: 11 MMOL/L (ref 8–16)
APTT PPP: 29.4 SEC (ref 21–32)
AST SERPL-CCNC: 25 U/L (ref 10–40)
BASOPHILS # BLD AUTO: 0.04 K/UL (ref 0–0.2)
BASOPHILS NFR BLD: 0.4 % (ref 0–1.9)
BILIRUB DIRECT SERPL-MCNC: 0.5 MG/DL (ref 0.1–0.3)
BILIRUB SERPL-MCNC: 0.9 MG/DL (ref 0.1–1)
BUN SERPL-MCNC: 13 MG/DL (ref 6–20)
CALCIUM SERPL-MCNC: 8.9 MG/DL (ref 8.7–10.5)
CHLORIDE SERPL-SCNC: 103 MMOL/L (ref 95–110)
CO2 SERPL-SCNC: 23 MMOL/L (ref 23–29)
CREAT SERPL-MCNC: 1.4 MG/DL (ref 0.5–1.4)
DELSYS: ABNORMAL
DELSYS: ABNORMAL
DIFFERENTIAL METHOD: ABNORMAL
EOSINOPHIL # BLD AUTO: 0.1 K/UL (ref 0–0.5)
EOSINOPHIL NFR BLD: 0.7 % (ref 0–8)
ERYTHROCYTE [DISTWIDTH] IN BLOOD BY AUTOMATED COUNT: 16.8 % (ref 11.5–14.5)
EST. GFR  (NO RACE VARIABLE): >60 ML/MIN/1.73 M^2
FIO2: 21
GLUCOSE SERPL-MCNC: 195 MG/DL (ref 70–110)
HCO3 UR-SCNC: 25.2 MMOL/L (ref 24–28)
HCO3 UR-SCNC: 28.2 MMOL/L (ref 24–28)
HCT VFR BLD AUTO: 33.6 % (ref 40–54)
HGB BLD-MCNC: 10.6 G/DL (ref 14–18)
IMM GRANULOCYTES # BLD AUTO: 0.03 K/UL (ref 0–0.04)
IMM GRANULOCYTES NFR BLD AUTO: 0.3 % (ref 0–0.5)
INR PPP: 1.6 (ref 0.8–1.2)
LDH SERPL L TO P-CCNC: 281 U/L (ref 110–260)
LYMPHOCYTES # BLD AUTO: 2 K/UL (ref 1–4.8)
LYMPHOCYTES NFR BLD: 21.7 % (ref 18–48)
MAGNESIUM SERPL-MCNC: 2 MG/DL (ref 1.6–2.6)
MCH RBC QN AUTO: 26 PG (ref 27–31)
MCHC RBC AUTO-ENTMCNC: 31.5 G/DL (ref 32–36)
MCV RBC AUTO: 82 FL (ref 82–98)
MODE: ABNORMAL
MONOCYTES # BLD AUTO: 0.7 K/UL (ref 0.3–1)
MONOCYTES NFR BLD: 8 % (ref 4–15)
NEUTROPHILS # BLD AUTO: 6.3 K/UL (ref 1.8–7.7)
NEUTROPHILS NFR BLD: 68.9 % (ref 38–73)
NRBC BLD-RTO: 0 /100 WBC
PCO2 BLDA: 47.5 MMHG (ref 35–45)
PCO2 BLDA: 53.2 MMHG (ref 35–45)
PH SMN: 7.33 [PH] (ref 7.35–7.45)
PH SMN: 7.33 [PH] (ref 7.35–7.45)
PLATELET # BLD AUTO: 269 K/UL (ref 150–450)
PMV BLD AUTO: 10.2 FL (ref 9.2–12.9)
PO2 BLDA: 24 MMHG (ref 40–60)
PO2 BLDA: 29 MMHG (ref 40–60)
POC BE: -1 MMOL/L
POC BE: 2 MMOL/L
POC SATURATED O2: 38 % (ref 95–100)
POC SATURATED O2: 50 % (ref 95–100)
POC TCO2: 27 MMOL/L (ref 24–29)
POC TCO2: 30 MMOL/L (ref 24–29)
POCT GLUCOSE: 105 MG/DL (ref 70–110)
POCT GLUCOSE: 108 MG/DL (ref 70–110)
POCT GLUCOSE: 134 MG/DL (ref 70–110)
POCT GLUCOSE: 136 MG/DL (ref 70–110)
POCT GLUCOSE: 186 MG/DL (ref 70–110)
POTASSIUM SERPL-SCNC: 3.6 MMOL/L (ref 3.5–5.1)
PROT SERPL-MCNC: 7.5 G/DL (ref 6–8.4)
PROTHROMBIN TIME: 16 SEC (ref 9–12.5)
RBC # BLD AUTO: 4.08 M/UL (ref 4.6–6.2)
SAMPLE: ABNORMAL
SAMPLE: ABNORMAL
SITE: ABNORMAL
SITE: ABNORMAL
SODIUM SERPL-SCNC: 137 MMOL/L (ref 136–145)
SP02: 98
WBC # BLD AUTO: 9.15 K/UL (ref 3.9–12.7)

## 2023-04-03 PROCEDURE — 82803 BLOOD GASES ANY COMBINATION: CPT

## 2023-04-03 PROCEDURE — 63600175 PHARM REV CODE 636 W HCPCS: Performed by: PHYSICIAN ASSISTANT

## 2023-04-03 PROCEDURE — 99233 SBSQ HOSP IP/OBS HIGH 50: CPT | Mod: FS,,, | Performed by: PHYSICIAN ASSISTANT

## 2023-04-03 PROCEDURE — 99233 PR SUBSEQUENT HOSPITAL CARE,LEVL III: ICD-10-PCS | Mod: FS,,, | Performed by: PHYSICIAN ASSISTANT

## 2023-04-03 PROCEDURE — 25000003 PHARM REV CODE 250: Performed by: PHYSICIAN ASSISTANT

## 2023-04-03 PROCEDURE — 25000003 PHARM REV CODE 250: Performed by: STUDENT IN AN ORGANIZED HEALTH CARE EDUCATION/TRAINING PROGRAM

## 2023-04-03 PROCEDURE — 85730 THROMBOPLASTIN TIME PARTIAL: CPT | Performed by: STUDENT IN AN ORGANIZED HEALTH CARE EDUCATION/TRAINING PROGRAM

## 2023-04-03 PROCEDURE — 99900035 HC TECH TIME PER 15 MIN (STAT)

## 2023-04-03 PROCEDURE — 80048 BASIC METABOLIC PNL TOTAL CA: CPT | Performed by: STUDENT IN AN ORGANIZED HEALTH CARE EDUCATION/TRAINING PROGRAM

## 2023-04-03 PROCEDURE — 83615 LACTATE (LD) (LDH) ENZYME: CPT | Performed by: STUDENT IN AN ORGANIZED HEALTH CARE EDUCATION/TRAINING PROGRAM

## 2023-04-03 PROCEDURE — 97161 PT EVAL LOW COMPLEX 20 MIN: CPT

## 2023-04-03 PROCEDURE — 94761 N-INVAS EAR/PLS OXIMETRY MLT: CPT

## 2023-04-03 PROCEDURE — 83735 ASSAY OF MAGNESIUM: CPT | Performed by: STUDENT IN AN ORGANIZED HEALTH CARE EDUCATION/TRAINING PROGRAM

## 2023-04-03 PROCEDURE — 93750 INTERROGATION VAD IN PERSON: CPT | Mod: ,,, | Performed by: INTERNAL MEDICINE

## 2023-04-03 PROCEDURE — 27000248 HC VAD-ADDITIONAL DAY

## 2023-04-03 PROCEDURE — 85610 PROTHROMBIN TIME: CPT | Performed by: STUDENT IN AN ORGANIZED HEALTH CARE EDUCATION/TRAINING PROGRAM

## 2023-04-03 PROCEDURE — 20600001 HC STEP DOWN PRIVATE ROOM

## 2023-04-03 PROCEDURE — 97165 OT EVAL LOW COMPLEX 30 MIN: CPT

## 2023-04-03 PROCEDURE — G0378 HOSPITAL OBSERVATION PER HR: HCPCS

## 2023-04-03 PROCEDURE — 80076 HEPATIC FUNCTION PANEL: CPT | Performed by: STUDENT IN AN ORGANIZED HEALTH CARE EDUCATION/TRAINING PROGRAM

## 2023-04-03 PROCEDURE — 99233 SBSQ HOSP IP/OBS HIGH 50: CPT | Mod: ,,, | Performed by: INTERNAL MEDICINE

## 2023-04-03 PROCEDURE — 82800 BLOOD PH: CPT

## 2023-04-03 PROCEDURE — 99232 PR SUBSEQUENT HOSPITAL CARE,LEVL II: ICD-10-PCS | Mod: ,,, | Performed by: NURSE PRACTITIONER

## 2023-04-03 PROCEDURE — 93750 PR INTERROGATE VENT ASSIST DEV, IN PERSON, W PHYSICIAN ANALYSIS: ICD-10-PCS | Mod: ,,, | Performed by: INTERNAL MEDICINE

## 2023-04-03 PROCEDURE — 99233 PR SUBSEQUENT HOSPITAL CARE,LEVL III: ICD-10-PCS | Mod: ,,, | Performed by: INTERNAL MEDICINE

## 2023-04-03 PROCEDURE — 27000685 HC LVAD KIT (30 DAY SUPPLY)

## 2023-04-03 PROCEDURE — 25000003 PHARM REV CODE 250: Performed by: INTERNAL MEDICINE

## 2023-04-03 PROCEDURE — 99232 SBSQ HOSP IP/OBS MODERATE 35: CPT | Mod: ,,, | Performed by: NURSE PRACTITIONER

## 2023-04-03 PROCEDURE — 85025 COMPLETE CBC W/AUTO DIFF WBC: CPT | Performed by: STUDENT IN AN ORGANIZED HEALTH CARE EDUCATION/TRAINING PROGRAM

## 2023-04-03 PROCEDURE — 63600175 PHARM REV CODE 636 W HCPCS: Performed by: STUDENT IN AN ORGANIZED HEALTH CARE EDUCATION/TRAINING PROGRAM

## 2023-04-03 RX ORDER — MUPIROCIN 20 MG/G
OINTMENT TOPICAL 2 TIMES DAILY
Status: DISCONTINUED | OUTPATIENT
Start: 2023-04-03 | End: 2023-04-07 | Stop reason: HOSPADM

## 2023-04-03 RX ORDER — POTASSIUM CHLORIDE 20 MEQ/1
40 TABLET, EXTENDED RELEASE ORAL ONCE
Status: COMPLETED | OUTPATIENT
Start: 2023-04-03 | End: 2023-04-03

## 2023-04-03 RX ORDER — FUROSEMIDE 10 MG/ML
40 INJECTION INTRAMUSCULAR; INTRAVENOUS DAILY
Status: DISCONTINUED | OUTPATIENT
Start: 2023-04-03 | End: 2023-04-05

## 2023-04-03 RX ORDER — FUROSEMIDE 10 MG/ML
40 INJECTION INTRAMUSCULAR; INTRAVENOUS ONCE
Status: CANCELLED | OUTPATIENT
Start: 2023-04-03

## 2023-04-03 RX ORDER — WARFARIN 3 MG/1
6 TABLET ORAL
Status: DISCONTINUED | OUTPATIENT
Start: 2023-04-03 | End: 2023-04-07 | Stop reason: HOSPADM

## 2023-04-03 RX ORDER — CYCLOBENZAPRINE HCL 5 MG
5 TABLET ORAL 3 TIMES DAILY PRN
Status: DISCONTINUED | OUTPATIENT
Start: 2023-04-03 | End: 2023-04-07 | Stop reason: HOSPADM

## 2023-04-03 RX ADMIN — INSULIN ASPART 10 UNITS: 100 INJECTION, SOLUTION INTRAVENOUS; SUBCUTANEOUS at 05:04

## 2023-04-03 RX ADMIN — LEVETIRACETAM 1000 MG: 500 TABLET, FILM COATED ORAL at 08:04

## 2023-04-03 RX ADMIN — INSULIN DETEMIR 15 UNITS: 100 INJECTION, SOLUTION SUBCUTANEOUS at 08:04

## 2023-04-03 RX ADMIN — METHOCARBAMOL 500 MG: 500 TABLET ORAL at 09:04

## 2023-04-03 RX ADMIN — INSULIN ASPART 10 UNITS: 100 INJECTION, SOLUTION INTRAVENOUS; SUBCUTANEOUS at 12:04

## 2023-04-03 RX ADMIN — GABAPENTIN 100 MG: 100 CAPSULE ORAL at 02:04

## 2023-04-03 RX ADMIN — WARFARIN SODIUM 6 MG: 3 TABLET ORAL at 05:04

## 2023-04-03 RX ADMIN — MUPIROCIN: 20 OINTMENT TOPICAL at 08:04

## 2023-04-03 RX ADMIN — FAMOTIDINE 40 MG: 20 TABLET ORAL at 09:04

## 2023-04-03 RX ADMIN — MILRINONE LACTATE IN DEXTROSE 0.25 MCG/KG/MIN: 200 INJECTION, SOLUTION INTRAVENOUS at 02:04

## 2023-04-03 RX ADMIN — MILRINONE LACTATE IN DEXTROSE 0.25 MCG/KG/MIN: 200 INJECTION, SOLUTION INTRAVENOUS at 01:04

## 2023-04-03 RX ADMIN — Medication 100 MG: at 09:04

## 2023-04-03 RX ADMIN — MUPIROCIN: 20 OINTMENT TOPICAL at 11:04

## 2023-04-03 RX ADMIN — INSULIN ASPART 10 UNITS: 100 INJECTION, SOLUTION INTRAVENOUS; SUBCUTANEOUS at 09:04

## 2023-04-03 RX ADMIN — LEVETIRACETAM 1000 MG: 500 TABLET, FILM COATED ORAL at 09:04

## 2023-04-03 RX ADMIN — INSULIN ASPART 2 UNITS: 100 INJECTION, SOLUTION INTRAVENOUS; SUBCUTANEOUS at 09:04

## 2023-04-03 RX ADMIN — INSULIN DETEMIR 15 UNITS: 100 INJECTION, SOLUTION SUBCUTANEOUS at 09:04

## 2023-04-03 RX ADMIN — GABAPENTIN 100 MG: 100 CAPSULE ORAL at 09:04

## 2023-04-03 RX ADMIN — METHOCARBAMOL 500 MG: 500 TABLET ORAL at 02:04

## 2023-04-03 RX ADMIN — GABAPENTIN 100 MG: 100 CAPSULE ORAL at 08:04

## 2023-04-03 RX ADMIN — POTASSIUM CHLORIDE 40 MEQ: 1500 TABLET, EXTENDED RELEASE ORAL at 09:04

## 2023-04-03 RX ADMIN — FUROSEMIDE 40 MG: 10 INJECTION, SOLUTION INTRAMUSCULAR; INTRAVENOUS at 12:04

## 2023-04-03 RX ADMIN — OXYCODONE HYDROCHLORIDE AND ACETAMINOPHEN 1 TABLET: 5; 325 TABLET ORAL at 01:04

## 2023-04-03 RX ADMIN — SPIRONOLACTONE 25 MG: 25 TABLET, FILM COATED ORAL at 09:04

## 2023-04-03 NOTE — PROGRESS NOTES
[facility] - Cardiology Stepdown  Endocrinology  Progress Note    Admit Date: 4/2/2023     Reason for Consult: Management of T2DM, Hyperglycemia      Surgical Procedure and Date: LVAD 06/29/2022     Diabetes diagnosis year: 2022     Home Diabetes Medications:   -Levemir 22 units BID.   -Novolog 16 units TIDWM in addition to the following correction scale:     150 - 200 + 1 unit     201 - 250 + 2 units     251 - 300 + 3 units     301 - 350 + 4 units      > 350   + 5 units     How often checking glucose at home?  Uses Freestyle William    BG readings on regimen: 200 or higher and occasionally had just reading of HIGH   Hypoglycemia on the regimen?  No  Missed doses on regimen?  Yes, occasionally skips breakfast do to sleeping in and will skip Novolog with breakfast      Diabetes Complications include:     Hyperglycemia     Complicating diabetes co morbidities:   History of MI, CHF, and CKD        HPI: 36 yo black male with stage d HFrEF, NICM, Familial CM (Father had LVAD and subsequent heart transplant), h/o PSA, DM who is s/p DT-HM3 implantation 6/23/2022 with early RV failure requiring RVAD. He also has a history of unclear syncope vs seizures and is followed by neuro for this and is on Keppra. Patient presents today as a transfer from [place] after he presented there after he called about running out of his milrinone. Wife was at bedside that states, she has been in contact with everyone but a few weeks ago she had changed her phone number and lost contact at one point with someone in infusion. Patient overall denies any complaints, celebrated his birthday yesterday. He was lat seen in clinic with Dr. Villatoro on 3/23. Reports compliance with all his medication. He does however, report that he takes his lasix but not 80 mg, daily he started taking 40 mg, daily and at times he wouldn't because he feels dry. Last took his Lasix Friday. Patient denies any fever, chills, nausea, vomiting, hematemesis, cough, chest pain,  "palpitations, shortness of breath, abdominal pain/distension, changes in bowel or urinary habits, no hematochezia or melena. Endocrine consulted to manage hyperglycemia and type 2 diabetes.     Lab Results   Component Value Date    HGBA1C 12.2 (H) 2022              Interval HPI:   Overnight events: No acute events overnight. Patient in room 302/302 A. Blood glucose stable. BG at goal on current insulin regimen (SSI, prandial, and basal insulin ). Steroid use- None.    Renal function- Normal   Vasopressors-  None       Endocrine will continue to follow and manage insulin orders inpatient.         Diet Cardiac     Eatin%  Nausea: No  Hypoglycemia and intervention: No  Fever: No  TPN and/or TF: No    BP (!) 94/0 (BP Location: Left arm, Patient Position: Lying)   Pulse 109   Temp 98.3 °F (36.8 °C) (Oral)   Resp 16   Ht 6' 3" (1.905 m)   Wt 105.6 kg (232 lb 12.9 oz)   SpO2 96%   BMI 29.10 kg/m²     Labs Reviewed and Include    Recent Labs   Lab 23  0444   *   CALCIUM 8.9   ALBUMIN 3.7   PROT 7.5      K 3.6   CO2 23      BUN 13   CREATININE 1.4   ALKPHOS 107   ALT 16   AST 25   BILITOT 0.9     Lab Results   Component Value Date    WBC 9.15 2023    HGB 10.6 (L) 2023    HCT 33.6 (L) 2023    MCV 82 2023     2023     No results for input(s): TSH, FREET4 in the last 168 hours.  Lab Results   Component Value Date    HGBA1C 7.9 (H) 2023       Nutritional status:   Body mass index is 29.1 kg/m².  Lab Results   Component Value Date    ALBUMIN 3.7 2023    ALBUMIN 3.7 2023    ALBUMIN 3.7 2023     Lab Results   Component Value Date    PREALBUMIN 21 2023    PREALBUMIN 24 2023    PREALBUMIN 25 2022       Estimated Creatinine Clearance: 94 mL/min (based on SCr of 1.4 mg/dL).    Accu-Checks  Recent Labs     237 2332   POCTGLUCOSE 120* 134* 186*       Current Medications and/or " Treatments Impacting Glycemic Control  Immunotherapy:    Immunosuppressants       None          Steroids:   Hormones (From admission, onward)      None          Pressors:    Autonomic Drugs (From admission, onward)      None          Hyperglycemia/Diabetes Medications:   Antihyperglycemics (From admission, onward)      Start     Stop Route Frequency Ordered    04/02/23 2100  insulin detemir U-100 (Levemir) pen 15 Units         -- SubQ 2 times daily 04/02/23 1152    04/02/23 1645  insulin aspart U-100 pen 10 Units         -- SubQ 3 times daily with meals 04/02/23 1310    04/02/23 1412  insulin aspart U-100 pen 1-10 Units         -- SubQ Before meals, nightly and at 0200 PRN 04/02/23 1313            ASSESSMENT and PLAN    Cardiac/Vascular  * Acute on chronic combined systolic and diastolic heart failure  Managed per primary team  Avoid hypoglycemia        LVAD (left ventricular assist device) present  Managed per primary team  Avoid hypoglycemia        Endocrine  Type 2 diabetes mellitus with hyperglycemia  Endocrinology consulted for BG management.   BG goal 140-180    30% reduction in home regimen  - Levemir (Insulin Detemir) 15 units BID  - Novolog (Insulin Aspart) 10 units TIDWM and prn for BG excursions [facility] SSI (150/25)  - BG checks AC/HS/0200  - Hypoglycemia protocol in place    ** Please notify Endocrine for any change and/or advance in diet**  ** Please call Endocrine for any BG related issues **    Discharge Planning:   TBD. Please notify endocrinology prior to discharge.             Michael Wyman, DNP, FNP  Endocrinology  [facility] - [facility]iology Step[facility]

## 2023-04-03 NOTE — SUBJECTIVE & OBJECTIVE
Interval History: Admitted yesterday after home IV milrinone ran out. Found to be volume overloaded with CVP of 18 and was given 80 mg IVP Lasix x1. Net -ve 3 L since admit. Pt reports cramping such higher dose of IVP Lasix and would prefer 40 mg IVP Lasix. CVP 16 this morning. Will give single dose and monitor response. Consider redosing tonight.     Continuous Infusions:   milrinone 20mg/100ml D5W (200mcg/ml) 0.25 mcg/kg/min (04/03/23 0151)     Scheduled Meds:   famotidine  40 mg Oral Daily    [START ON 4/4/2023] furosemide (LASIX) injection  40 mg Intravenous Daily    gabapentin  100 mg Oral TID    insulin aspart U-100  10 Units Subcutaneous TIDWM    insulin detemir U-100  15 Units Subcutaneous BID    INV ASPIRIN 100MG/PLACEBO  100 mg Oral Daily    levETIRAcetam  1,000 mg Oral BID    methocarbamoL  500 mg Oral QID    mupirocin   Nasal BID    spironolactone  25 mg Oral Daily    warfarin  6 mg Oral Every Mon, Wed, Fri    [START ON 4/4/2023] warfarin  4.5 mg Oral Every Tues, Thurs, Sat, Sun     PRN Meds:cyclobenzaprine, dextrose 10%, dextrose 10%, dextrose, dextrose, glucagon (human recombinant), insulin aspart U-100, oxyCODONE-acetaminophen    Review of patient's allergies indicates:   Allergen Reactions    Aspirin Other (See Comments)     Mr. Thacker is enrolled in Dr. Paige's Alon Trial and cannot have any aspirin and/or aspirin-containing products. DO NOT cancel any orders for INV Aspirin 100 mg/Placebo. If you have questions, please contact Isabel @ [phone], [phone],[email or web address], secure chat or MS Teams message.    Bumex [bumetanide] Hives    Lactose Diarrhea     Other reaction(s): Abdominal distension, gaseous    Torsemide Hives     Objective:     Vital Signs (Most Recent):  Temp: 98.3 °F (36.8 °C) (04/03/23 0520)  Pulse: 109 (04/03/23 0330)  Resp: 16 (04/03/23 0520)  BP: (!) 94/0 (04/03/23 0529)  SpO2: 96 % (04/03/23 0520)   Vital Signs (24h Range):  Temp:  [97.7 °F (36.5 °C)-98.3 °F (36.8  °C)] 98.3 °F (36.8 °C)  Pulse:  [] 109  Resp:  [14-20] 16  SpO2:  [96 %-99 %] 96 %  BP: ()/(0-80) 94/0     Patient Vitals for the past 72 hrs (Last 3 readings):   Weight   04/03/23 0700 105.6 kg (232 lb 12.9 oz)   04/02/23 1220 108.3 kg (238 lb 12.1 oz)   04/02/23 1108 107 kg (235 lb 14.3 oz)     Body mass index is 29.1 kg/m².      Intake/Output Summary (Last 24 hours) at 4/3/2023 1044  Last data filed at 4/3/2023 0925  Gross per 24 hour   Intake 960 ml   Output 3900 ml   Net -2940 ml       Hemodynamic Parameters:  CVP:  [16 mmHg-19 mmHg] 16 mmHg      Physical Exam  Vitals and nursing note reviewed.   HENT:      Head: Normocephalic and atraumatic.      Nose: Nose normal.   Eyes:      Conjunctiva/sclera: Conjunctivae normal.   Cardiovascular:      Rate and Rhythm: Normal rate and regular rhythm.      Comments: Smooth VAD hum   Pulmonary:      Effort: Pulmonary effort is normal.   Abdominal:      General: Abdomen is flat. There is no distension.   Musculoskeletal:         General: Normal range of motion.      Cervical back: Normal range of motion.   Skin:     General: Skin is warm.   Neurological:      General: No focal deficit present.      Mental Status: He is alert.   Psychiatric:         Mood and Affect: Mood normal.         Behavior: Behavior normal.         Thought Content: Thought content normal.         Judgment: Judgment normal.       Significant Labs:  CBC:  Recent Labs   Lab 04/02/23  0515 04/02/23  1151 04/03/23  0444   WBC 9.3 9.31 9.15   RBC 4.25* 4.21* 4.08*   HGB 11.0* 10.8* 10.6*   HCT 34.0* 34.9* 33.6*    244 269   MCV 80.0 83 82   MCH 25.9* 25.7* 26.0*   MCHC 32.4* 30.9* 31.5*     BNP:  Recent Labs   Lab 04/02/23  0515   .4*     CMP:  Recent Labs   Lab 04/02/23  0515 04/02/23  1151 04/02/23  1614 04/03/23  0444   GLU  --  177* 152* 195*   CALCIUM 9.1 8.9 9.6 8.9   ALBUMIN 3.7 3.7  --  3.7   PROT  --  7.5  --  7.5    139 138 137   K 4.1 3.4* 3.8 3.6   CO2 27 24 26 23    CL  --  105 101 103   BUN 9.1 10 9 13   CREATININE 1.59* 1.3 1.3 1.4   ALKPHOS 92 101  --  107   ALT 15 15  --  16   AST 21 22  --  25   BILITOT 1.7* 1.2*  --  0.9      Coagulation:   Recent Labs   Lab 04/02/23  0515 04/02/23  1151 04/03/23  0444   INR 1.75* 1.7* 1.6*   APTT  --  31.2 29.4     LDH:  Recent Labs   Lab 04/02/23  1151 04/03/23  0444   * 281*     Microbiology:  Microbiology Results (last 7 days)       ** No results found for the last 168 hours. **            I have reviewed all pertinent labs within the past 24 hours.    Estimated Creatinine Clearance: 94 mL/min (based on SCr of 1.4 mg/dL).    Diagnostic Results:  I have reviewed all pertinent imaging results/findings within the past 24 hours.

## 2023-04-03 NOTE — PLAN OF CARE
Recommendations   1. Continue cardiac diet-add diabetic restrictions- fluid per MD   2. RD following     Goals: Meet % of EEN/EPN by RD f/u date  Nutrition Goal Status: goal met  Communication of RD Recs: POC

## 2023-04-03 NOTE — PROGRESS NOTES
"[facility] - Cardiology Stepdown  Adult Nutrition  Progress Note    SUMMARY       Recommendations   1. Continue cardiac diet-add diabetic restrictions- fluid per MD   2. RD following    Goals: Meet % of EEN/EPN by RD f/u date  Nutrition Goal Status: goal met  Communication of RD Recs: POC    Assessment and Plan    Nutrition Problem  Increased protein/energy needs     Related to (etiology):   Physiological demands     Signs and Symptoms (as evidenced by):   Acute on chronic combined systolic and diastolic heart failure     Interventions (treatment strategy):  Collaboration of nutrition care w/ other providers     Nutrition Diagnosis Status:   New        Reason for Assessment    Reason For Assessment: consult  Diagnosis: cardiac disease  Relevant Medical History: CHF, cardiomyopathy, DM  Interdisciplinary Rounds: attended  General Information Comments: Spoke w/ pt at bedside, reports a good appetite currently and PTA w/100% meal consumption for breakfast. Pt denies any issues w/ chewing/swallowing at this time. No n/v/d/c. UBW of 226-236 lbs.Diabetic diet education provided 4/3/23, d/t A1c of 7.9. NFPE completed 4/3, pt nourished w/ no s/s of malnutrition at this time. LBM noted-4/2/23  Nutrition Discharge Planning: pending clinical course    Nutrition Risk Screen    Nutrition Risk Screen: no indicators present    Nutrition/Diet History    Spiritual, Cultural Beliefs, Zoroastrian Practices, Values that Affect Care: no  Food Allergies: NKFA    Anthropometrics    Temp: 96.9 °F (36.1 °C)  Height Method: Stated  Height: 6' 3" (190.5 cm)  Height (inches): 75 in  Weight Method: Standard Scale  Weight: 105.6 kg (232 lb 12.9 oz)  Weight (lb): 232.81 lb  Ideal Body Weight (IBW), Male: 196 lb  % Ideal Body Weight, Male (lb): 121.82 %  BMI (Calculated): 29.1  BMI Grade: 25 - 29.9 - overweight       Lab/Procedures/Meds    Pertinent Labs Reviewed: reviewed  Pertinent Labs Comments: Glucose 195  Pertinent Medications Reviewed: " reviewed  Pertinent Medications Comments: insulin, coumadin      Estimated/Assessed Needs    Weight Used For Calorie Calculations: 105.6 kg (232 lb 12.9 oz)  Energy Calorie Requirements (kcal): 2061  Energy Need Method: [place]-St Jeor (PAL 1.0)  Protein Requirements: 105 g (1.0 g/kg)  Weight Used For Protein Calculations: 105.6 kg (232 lb 12.9 oz)        RDA Method (mL): 2061         Nutrition Prescription Ordered    Current Diet Order: Regular diet    Evaluation of Received Nutrient/Fluid Intake    I/O: -3.1 L since admit  Energy Calories Required: meeting needs  Protein Required: meeting needs  Fluid Required: meeting needs  Total Fluid Intake (mL/kg): 1 ml or fluid per MD  Tolerance: tolerating  % Intake of Estimated Energy Needs: 75 - 100 %  % Meal Intake: 75 - 100 %    Nutrition Risk    Level of Risk/Frequency of Follow-up: low ((2x/week))     Monitor and Evaluation    Food and Nutrient Intake: energy intake, food and beverage intake  Food and Nutrient Adminstration: diet order  Knowledge/Beliefs/Attitudes: food and nutrition knowledge/skill, beliefs and attitudes  Physical Activity and Function: nutrition-related ADLs and IADLs, factors affecting access to physical activity  Anthropometric Measurements: height/length, weight change, weight, body mass index, growth pattern indices/percentile ranks  Biochemical Data, Medical Tests and Procedures: electrolyte and renal panel, gastrointestinal profile, glucose/endocrine profile, inflammatory profile, lipid profile  Nutrition-Focused Physical Findings: overall appearance, extremities, muscles and bones, skin, head and eyes     Nutrition Follow-Up    RD Follow-up?: Yes  By Samantha Maciel, Registration Eligible, Provisional LDN

## 2023-04-03 NOTE — SUBJECTIVE & OBJECTIVE
"Interval HPI:   Overnight events: No acute events overnight. Patient in room 302/302 A. Blood glucose stable. BG at goal on current insulin regimen (SSI, prandial, and basal insulin ). Steroid use- None.    Renal function- Normal   Vasopressors-  None       Endocrine will continue to follow and manage insulin orders inpatient.         Diet Cardiac     Eatin%  Nausea: No  Hypoglycemia and intervention: No  Fever: No  TPN and/or TF: No    BP (!) 94/0 (BP Location: Left arm, Patient Position: Lying)   Pulse 109   Temp 98.3 °F (36.8 °C) (Oral)   Resp 16   Ht 6' 3" (1.905 m)   Wt 105.6 kg (232 lb 12.9 oz)   SpO2 96%   BMI 29.10 kg/m²     Labs Reviewed and Include    Recent Labs   Lab 23  0444   *   CALCIUM 8.9   ALBUMIN 3.7   PROT 7.5      K 3.6   CO2 23      BUN 13   CREATININE 1.4   ALKPHOS 107   ALT 16   AST 25   BILITOT 0.9     Lab Results   Component Value Date    WBC 9.15 2023    HGB 10.6 (L) 2023    HCT 33.6 (L) 2023    MCV 82 2023     2023     No results for input(s): TSH, FREET4 in the last 168 hours.  Lab Results   Component Value Date    HGBA1C 7.9 (H) 2023       Nutritional status:   Body mass index is 29.1 kg/m².  Lab Results   Component Value Date    ALBUMIN 3.7 2023    ALBUMIN 3.7 2023    ALBUMIN 3.7 2023     Lab Results   Component Value Date    PREALBUMIN 21 2023    PREALBUMIN 24 2023    PREALBUMIN 25 2022       Estimated Creatinine Clearance: 94 mL/min (based on SCr of 1.4 mg/dL).    Accu-Checks  Recent Labs     23  0137 23  0832   POCTGLUCOSE 120* 134* 186*       Current Medications and/or Treatments Impacting Glycemic Control  Immunotherapy:    Immunosuppressants       None          Steroids:   Hormones (From admission, onward)      None          Pressors:    Autonomic Drugs (From admission, onward)      None          Hyperglycemia/Diabetes Medications: "   Antihyperglycemics (From admission, onward)      Start     Stop Route Frequency Ordered    04/02/23 2100  insulin detemir U-100 (Levemir) pen 15 Units         -- SubQ 2 times daily 04/02/23 1152    04/02/23 1645  insulin aspart U-100 pen 10 Units         -- SubQ 3 times daily with meals 04/02/23 1310    04/02/23 1412  insulin aspart U-100 pen 1-10 Units         -- SubQ Before meals, nightly and at 0200 PRN 04/02/23 1313

## 2023-04-03 NOTE — PT/OT/SLP EVAL
Physical Therapy Evaluation and Discharge Note    Patient Name:  Kevan Queen   MRN:  [id number]    Recommendations:     Discharge Recommendations: other (see comments)  Discharge Equipment Recommendations: none   Barriers to discharge: None    Assessment:     Kevan Queen is a 38 y.o. male admitted with a medical diagnosis of Acute on chronic combined systolic and diastolic heart failure. Pt with h/o LVAD implant in 2022.  At this time, patient is functioning at their prior level of function and does not require further acute PT services. Pt denied any recent falls or change in functional status. Will sign off at this time.     Recent Surgery: * No surgery found *      Plan:     During this hospitalization, patient does not require further acute PT services.  Please re-consult if situation changes.      Subjective     Chief Complaint: none   Patient/Family Comments/goals: to return home   Pain/Comfort:  Pain Rating 1: 0/10  Pain Rating Post-Intervention 1: 0/10    Patients cultural, spiritual, Catholic conflicts given the current situation: no    Living Environment/PLOF:  Patient lives with wife in a two story home with no MILY. Patient able to stay on first floor.   Prior to admission, patients level of function was (I) with mobility and ADLs. Denied any falls.  Equipment used at home: none.  DME owned (not currently used): none.  Upon discharge, patient will have assistance from family.    Objective:     Communicated with RN prior to session.  Patient found HOB elevated with LVAD, peripheral IV upon PT entry to room.    General Precautions: Standard, fall    Orthopedic Precautions:N/A   Braces: N/A  Respiratory Status: Room air    Exams:  Cognitive Exam:  Patient is AOx4   Gross Motor Coordination:  WFL  Postural Exam:  Patient presented with the following abnormalities:    -       No postural abnormalities identified  Sensation:    -       Intact  Skin Integrity/Edema:      -       Skin integrity: Visible skin  intact  -       Edema: None noted BLEs  RLE Strength: WFL  LLE Strength: WFL    Functional Mobility:    Bed Mobility:   Supine > Sit: independence  Sit > Supine: independence    Transfers:   Sit <> Stand Transfer: independence from EOB without AD    Balance:   Sitting balance: NORMAL: No deviations seen in posture held dynamically  Standing balance:   NORMAL: No deviations seen in posture held statically  NORMAL: No deviations seen in posture held dynamically                 Gait:  Distance: pt ambulated ~20 ft within room using no AD with independence. No LOB or acute gait deficits identified.    Assistive Device: none    AM-PAC 6 CLICK MOBILITY  Total Score:24     Treatment and Education:  Pt educated on:  - Role of PT   - Safety with mobility  - Instructed to notify team if any changes in functional status occur   - importance of daily ambulation to maintain strength and prevent deconditioning     Pt verbalized understanding and expressed no further concerns/questions.      Patient left supine with all lines intact, call button in reach, and RN notified.    GOALS:   Multidisciplinary Problems       Physical Therapy Goals       Not on file              Multidisciplinary Problems (Resolved)          Problem: Physical Therapy    Goal Priority Disciplines Outcome Goal Variances Interventions   Physical Therapy Goal   (Resolved)     PT, PT/OT Met                         History:     Past Medical History:   Diagnosis Date    Arthritis     Cardiomyopathy     CHF (congestive heart failure) 10/01/2020    Diabetes mellitus     Dilated cardiomyopathy 10/26/2020    Drug abuse 10/2020    Hyperosmolar hyperglycemic state (HHS) 5/25/2022    ICD (implantable cardioverter-defibrillator) in place 10/26/2020    Muscle cramping 6/15/2022    Renal disorder        Past Surgical History:   Procedure Laterality Date    APPLICATION OF WOUND VACUUM-ASSISTED CLOSURE DEVICE N/A 6/30/2022    Procedure: APPLICATION, WOUND VAC;  Surgeon:  Luis F Paige MD;  [facility];  Service: Cardiovascular;  Laterality: N/A;  50 x 5 cm    CARDIAC DEFIBRILLATOR PLACEMENT      IMPLANTATION OF RIGHT VENTRICULAR ASSIST DEVICE (RVAD) N/A 6/29/2022    Procedure: INSERTION, RVAD;  Surgeon: Luis F Paige MD;  Location: [facility] OR 2ND FLR;  Service: Cardiovascular;  Laterality: N/A;    INSERTION OF GRAFT TO PERICARDIUM Right 6/30/2022    Procedure: INSERTION-RIGHT VENTRICULAR ASSIST DEVICE;  Surgeon: Luis F Paige MD;  Location: [facility] OR 2ND FLR;  Service: Cardiovascular;  Laterality: Right;    IRRIGATION OF MEDIASTINUM  6/30/2022    Procedure: IRRIGATION, MEDIASTINUM;  Surgeon: Luis F Paige MD;  Location: [facility];  Service: Cardiovascular;;    LEFT VENTRICULAR ASSIST DEVICE Left 6/23/2022    Procedure: INSERTION-LEFT VENTRICULAR ASSIST DEVICE;  Surgeon: Luis F Paige MD;  Location: [facility];  Service: Cardiovascular;  Laterality: Left;    LEFT VENTRICULAR ASSIST DEVICE N/A 6/29/2022    Procedure: INSERTION-LEFT VENTRICULAR ASSIST DEVICE;  Surgeon: Luis F Paige MD;  Location: [facility];  Service: Cardiovascular;  Laterality: N/A;    RIGHT HEART CATHETERIZATION Right 4/8/2022    Procedure: INSERTION, CATHETER, RIGHT HEART;  Surgeon: Luca Lopez Jr., MD;  Location: [facility] CATH LAB;  Service: Cardiology;  Laterality: Right;    RIGHT HEART CATHETERIZATION Right 4/19/2022    Procedure: INSERTION, CATHETER, RIGHT HEART;  Surgeon: Josh Pulido MD;  Location: [facility] CATH LAB;  Service: Cardiology;  Laterality: Right;    RIGHT HEART CATHETERIZATION Right 7/21/2022    Procedure: INSERTION, CATHETER, RIGHT HEART;  Surgeon: Dalia Crum MD;  Location: [facility] CATH LAB;  Service: Cardiology;  Laterality: Right;    RIGHT HEART CATHETERIZATION Right 10/31/2022    Procedure: INSERTION, CATHETER, RIGHT HEART;  Surgeon: Dalia Crum MD;  Location: [facility] CATH LAB;  Service: Cardiology;  Laterality: Right;    STERNAL WOUND CLOSURE N/A  6/30/2022    Procedure: CLOSURE, WOUND, STERNUM;  Surgeon: Luis F Paige MD;  Location: [facility] OR [address] [place] [address];  Service: Cardiovascular;  Laterality: N/A;       Time Tracking:     PT Received On: 04/03/23  PT Start Time: 0935     PT Stop Time: 0941  PT Total Time (min): 6 min     Billable Minutes: Evaluation 6 min      04/03/2023

## 2023-04-03 NOTE — SUBJECTIVE & OBJECTIVE
Subjective:     Post-Op Info:  * No surgery found *              Interval History: patient admitted for running out of primacor infusion, needing refill. Continue home regimen.   Implant 6/29/2022    Medications:  Continuous Infusions:   milrinone 20mg/100ml D5W (200mcg/ml) 0.25 mcg/kg/min (04/03/23 0151)     Scheduled Meds:   famotidine  40 mg Oral Daily    [START ON 4/4/2023] furosemide (LASIX) injection  40 mg Intravenous Daily    gabapentin  100 mg Oral TID    insulin aspart U-100  10 Units Subcutaneous TIDWM    insulin detemir U-100  15 Units Subcutaneous BID    INV ASPIRIN 100MG/PLACEBO  100 mg Oral Daily    levETIRAcetam  1,000 mg Oral BID    methocarbamoL  500 mg Oral QID    mupirocin   Nasal BID    spironolactone  25 mg Oral Daily    warfarin  6 mg Oral Every Mon, Wed, Fri    [START ON 4/4/2023] warfarin  4.5 mg Oral Every Tues, Thurs, Sat, Sun     PRN Meds:cyclobenzaprine, dextrose 10%, dextrose 10%, dextrose, dextrose, glucagon (human recombinant), insulin aspart U-100, oxyCODONE-acetaminophen     Objective:     Vital Signs (Most Recent):  Temp: 98.3 °F (36.8 °C) (04/03/23 0520)  Pulse: 109 (04/03/23 0330)  Resp: 16 (04/03/23 0520)  BP: (!) 94/0 (04/03/23 0529)  SpO2: 96 % (04/03/23 0520)   Vital Signs (24h Range):  Temp:  [97.7 °F (36.5 °C)-98.3 °F (36.8 °C)] 98.3 °F (36.8 °C)  Pulse:  [] 109  Resp:  [14-20] 16  SpO2:  [96 %-99 %] 96 %  BP: ()/(0-80) 94/0       Intake/Output Summary (Last 24 hours) at 4/3/2023 1044  Last data filed at 4/3/2023 0925  Gross per 24 hour   Intake 960 ml   Output 3900 ml   Net -2940 ml       Physical Exam  Constitutional:       Appearance: Normal appearance.   HENT:      Head: Normocephalic and atraumatic.   Eyes:      Pupils: Pupils are equal, round, and reactive to light.   Cardiovascular:      Rate and Rhythm: Normal rate.      Comments: LVAD hum is smooth  Pulmonary:      Effort: Pulmonary effort is normal.   Abdominal:      General: Abdomen is flat.    Musculoskeletal:         General: Normal range of motion.      Cervical back: Normal range of motion.   Skin:     General: Skin is warm and dry.      Capillary Refill: Capillary refill takes less than 2 seconds.   Neurological:      General: No focal deficit present.      Mental Status: He is alert.       Significant Labs:  ABGs:   Recent Labs   Lab 04/03/23  0441   PH 7.333*   PCO2 47.5*   PO2 29*   HCO3 25.2   POCSATURATED 50*   BE -1     Amylase: No results for input(s): AMYLASE in the last 48 hours.  BMP:   Recent Labs   Lab 04/03/23  0444   *      K 3.6      CO2 23   BUN 13   CREATININE 1.4   CALCIUM 8.9   MG 2.0     Cardiac markers:   Recent Labs   Lab 04/02/23  0515   TROPONINI 0.121*     CBC:   Recent Labs   Lab 04/03/23 0444   WBC 9.15   RBC 4.08*   HGB 10.6*   HCT 33.6*      MCV 82   MCH 26.0*   MCHC 31.5*     CMP:   Recent Labs   Lab 04/03/23  0444   *   CALCIUM 8.9   ALBUMIN 3.7   PROT 7.5      K 3.6   CO2 23      BUN 13   CREATININE 1.4   ALKPHOS 107   ALT 16   AST 25   BILITOT 0.9     Coagulation:   Recent Labs   Lab 04/03/23  0444   INR 1.6*   APTT 29.4     Lactic Acid:   Recent Labs   Lab 04/02/23  1414   LACTATE 1.3     LFTs:   Recent Labs   Lab 04/03/23  0444   ALT 16   AST 25   ALKPHOS 107   BILITOT 0.9   PROT 7.5   ALBUMIN 3.7     Lipase: No results for input(s): LIPASE in the last 48 hours.    Significant Diagnostics:  I have reviewed all pertinent imaging results/findings within the past 24 hours.    Procedure: Device Interrogation Including analysis of device parameters  Current Settings: Ventricular Assist Device  Review of device function is stable  TXP LVAD INTERROGATIONS 4/3/2023 4/3/2023 4/2/2023 4/2/2023 4/2/2023 4/2/2023 2/2/2023   Type HeartMate3 HeartMate3 HeartMate3 HeartMate3 HeartMate3 HeartMate3 -   Flow 3.9 4.2 3.8 3.4 3.6 3.5 -   Speed 5100 5100 5100 5100 5100 5100 -   PI 6.1 4.6 5.8 6.9 7.8 7.9 -   Power (Serna) 3.6 3.7 3.8 2.9  2.8 2.7 -   [facility] 4700 4700 4700 4700 4700 4700 -   Low Flow Alarm - - - - - - -   High Power Alarm - - - - - - -   Pulsatility Intermittent pulse No Pulse No Pulse - No Pulse - No Pulse

## 2023-04-03 NOTE — PROGRESS NOTES
[facility] - Card[facility] Stepdown  Heart Transplant  Progress Note    Patient Name: Kevan Queen  MRN: [id number]  Admission Date: 4/2/2023  Hospital Length of Stay: 1 days  Attending Physician: Marlo Marques MD  Primary Care Provider: ORALIA Cline  Principal Problem:Acute on chronic combined systolic and diastolic heart failure    Subjective:     Interval History: Admitted yesterday after home IV milrinone ran out. Found to be volume overloaded with CVP of 18 and was given 80 mg IVP Lasix x1. Net -ve 3 L since admit. Pt reports cramping such higher dose of IVP Lasix and would prefer 40 mg IVP Lasix. CVP 16 this morning. Will give single dose and monitor response. Consider redosing tonight.     Continuous Infusions:   milrinone 20mg/100ml D5W (200mcg/ml) 0.25 mcg/kg/min (04/03/23 0151)     Scheduled Meds:   famotidine  40 mg Oral Daily    [START ON 4/4/2023] furosemide (LASIX) injection  40 mg Intravenous Daily    gabapentin  100 mg Oral TID    insulin aspart U-100  10 Units Subcutaneous TIDWM    insulin detemir U-100  15 Units Subcutaneous BID    INV ASPIRIN 100MG/PLACEBO  100 mg Oral Daily    levETIRAcetam  1,000 mg Oral BID    methocarbamoL  500 mg Oral QID    mupirocin   Nasal BID    spironolactone  25 mg Oral Daily    warfarin  6 mg Oral Every Mon, Wed, Fri    [START ON 4/4/2023] warfarin  4.5 mg Oral Every Tues, Thurs, Sat, Sun     PRN Meds:cyclobenzaprine, dextrose 10%, dextrose 10%, dextrose, dextrose, glucagon (human recombinant), insulin aspart U-100, oxyCODONE-acetaminophen    Review of patient's allergies indicates:   Allergen Reactions    Aspirin Other (See Comments)     Mr. Thacker is enrolled in Dr. Paige's Alon Trial and cannot have any aspirin and/or aspirin-containing products. DO NOT cancel any orders for INV Aspirin 100 mg/Placebo. If you have questions, please contact Isabel @ [phone], [phone],[email or web address], secure chat or MS Teams moises.    Bumex  [bumetanide] Hives    Lactose Diarrhea     Other reaction(s): Abdominal distension, gaseous    Torsemide Hives     Objective:     Vital Signs (Most Recent):  Temp: 98.3 °F (36.8 °C) (04/03/23 0520)  Pulse: 109 (04/03/23 0330)  Resp: 16 (04/03/23 0520)  BP: (!) 94/0 (04/03/23 0529)  SpO2: 96 % (04/03/23 0520)   Vital Signs (24h Range):  Temp:  [97.7 °F (36.5 °C)-98.3 °F (36.8 °C)] 98.3 °F (36.8 °C)  Pulse:  [] 109  Resp:  [14-20] 16  SpO2:  [96 %-99 %] 96 %  BP: ()/(0-80) 94/0     Patient Vitals for the past 72 hrs (Last 3 readings):   Weight   04/03/23 0700 105.6 kg (232 lb 12.9 oz)   04/02/23 1220 108.3 kg (238 lb 12.1 oz)   04/02/23 1108 107 kg (235 lb 14.3 oz)     Body mass index is 29.1 kg/m².      Intake/Output Summary (Last 24 hours) at 4/3/2023 1044  Last data filed at 4/3/2023 0925  Gross per 24 hour   Intake 960 ml   Output 3900 ml   Net -2940 ml       Hemodynamic Parameters:  CVP:  [16 mmHg-19 mmHg] 16 mmHg      Physical Exam  Vitals and nursing note reviewed.   HENT:      Head: Normocephalic and atraumatic.      Nose: Nose normal.   Eyes:      Conjunctiva/sclera: Conjunctivae normal.   Cardiovascular:      Rate and Rhythm: Normal rate and regular rhythm.      Comments: Smooth VAD hum   Pulmonary:      Effort: Pulmonary effort is normal.   Abdominal:      General: Abdomen is flat. There is no distension.   Musculoskeletal:         General: Normal range of motion.      Cervical back: Normal range of motion.   Skin:     General: Skin is warm.   Neurological:      General: No focal deficit present.      Mental Status: He is alert.   Psychiatric:         Mood and Affect: Mood normal.         Behavior: Behavior normal.         Thought Content: Thought content normal.         Judgment: Judgment normal.       Significant Labs:  CBC:  Recent Labs   Lab 04/02/23  0515 04/02/23  1151 04/03/23  0444   WBC 9.3 9.31 9.15   RBC 4.25* 4.21* 4.08*   HGB 11.0* 10.8* 10.6*   HCT 34.0* 34.9* 33.6*    244  269   MCV 80.0 83 82   MCH 25.9* 25.7* 26.0*   MCHC 32.4* 30.9* 31.5*     BNP:  Recent Labs   Lab 04/02/23  0515   .4*     CMP:  Recent Labs   Lab 04/02/23  0515 04/02/23  1151 04/02/23  1614 04/03/23  0444   GLU  --  177* 152* 195*   CALCIUM 9.1 8.9 9.6 8.9   ALBUMIN 3.7 3.7  --  3.7   PROT  --  7.5  --  7.5    139 138 137   K 4.1 3.4* 3.8 3.6   CO2 27 24 26 23   CL  --  105 101 103   BUN 9.1 10 9 13   CREATININE 1.59* 1.3 1.3 1.4   ALKPHOS 92 101  --  107   ALT 15 15  --  16   AST 21 22  --  25   BILITOT 1.7* 1.2*  --  0.9      Coagulation:   Recent Labs   Lab 04/02/23  0515 04/02/23  1151 04/03/23  0444   INR 1.75* 1.7* 1.6*   APTT  --  31.2 29.4     LDH:  Recent Labs   Lab 04/02/23  1151 04/03/23  0444   * 281*     Microbiology:  Microbiology Results (last 7 days)       ** No results found for the last 168 hours. **            I have reviewed all pertinent labs within the past 24 hours.    Estimated Creatinine Clearance: 94 mL/min (based on SCr of 1.4 mg/dL).    Diagnostic Results:  I have reviewed all pertinent imaging results/findings within the past 24 hours.    Assessment and Plan:     36 yo black male with stage d HFrEF, NICM, ? 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 He underwent RVAD removal and chest closure 6/30/2022.  He also completed a course of IV Abx for staph epi. He was weaned off  but he had to restarted due to RVF. He was eventually transitioned to milrinone (secondary to  shortage) now on 0.25 mcg/kg/min. He also has a history of unclear syncope vs seizures and is followed by neuro for this and is on Keppra. Patient presents today as a transfer from [place] after he presented there after he called about running out of his milrinone. Wife was at bedside that  states, she has been in contact with everyone but a few weeks ago she had changed her phone number and lost contact at one point with someone in infusion. Patient overall denies any complaints, celebrated his birthday yesterday. He was lat seen in clinic with Dr. Villatoro on 3/23. Reports compliance with all his medication. He does however, report that he takes his lasix but not 80 mg, daily he started taking 40 mg, daily and at times he wouldn't because he feels dry. Last took his Lasix ?Friday. Patient denies any fever, chills, nausea, vomiting, hematemesis, cough, chest pain, palpitations, shortness of breath, abdominal pain/distension, changes in bowel or urinary habits, no hematochezia or melena.      * Acute on chronic combined systolic and diastolic heart failure  - NICM s/p HM3-DT (6/2022)  - Continue with Milrinone 0.25 and check hemoydnamics  - Repeat Echocardiogram pending   - Home Diuresis: Lasix 40 mg, daily (per patient)  - Patient appears hypervolemic on exam with elevated JVP; CVP 18 on admit and was given 80 mg IVP Lasix x 1  - CVP 16 this AM. Net -ve 3L since admit. Pt prefers 40 mg IVP Lasix as higher doses make him cramp. Will give single dose of 40 mg IVP and monitor response, consider redosing tonight  - Monitor electrolytes closely. Maintain Mg >2 and K >4   - HF Pathway: Sodium restriction <2 g, Fluid restriction < 1500 mL, Strict I/Os, Daily weights    Temporal lobe seizure  - Resume Keppra  - Seizure precautions    LVAD (left ventricular assist device) present  -HeartMate 3 Implanted on 6/29/2022 as DT  -Continue Coumadin, Goal INR 2.0-3.0. Subherapeutic today: 1.7.   -Antiplatelets: INV Aspirin (Alon Trial)  -LDH is stable overall today.  Will continue to monitor daily.  -Speed set at 5100, LSL 4700 rpm  -ECHO pending. Last ECHO 9/1/22 with IV midline, AV does not open, Moderate-severe reduced RVSF, PASP 22, CVP 3, LVEDD 5.97 with LVAD speed 5100. IV milrinone increased from 0 .125 to 0.25  after RHC on 10/31/22    Procedure: Device Interrogation Including analysis of device parameters  Current Settings: Ventricular Assist Device  Review of device function is stable/unstable stable    TXP LVAD INTERROGATIONS 4/3/2023 4/3/2023 4/2/2023 4/2/2023 4/2/2023 4/2/2023 2/2/2023   Type HeartMate3 HeartMate3 HeartMate3 HeartMate3 HeartMate3 HeartMate3 -   Flow 3.9 4.2 3.8 3.4 3.6 3.5 -   Speed 5100 5100 5100 5100 5100 5100 -   PI 6.1 4.6 5.8 6.9 7.8 7.9 -   Power (Serna) 3.6 3.7 3.8 2.9 2.8 2.7 -   LSL 4700 4700 4700 4700 4700 4700 -   Low Flow Alarm - - - - - - -   High Power Alarm - - - - - - -   Pulsatility Intermittent pulse No Pulse No Pulse - No Pulse - No Pulse       Type 2 diabetes mellitus with hyperglycemia  - Last A1c: 9.2, Repeat A1c  - Home Regimen: Detemir 22 units BID; Aspart 14 TID  - Will start Levemir 15 units, BID and Aspart 5 TID and up-titrate while inpatient  - Endocrinology Consult        Denise Espinoza PA-C  Heart Transplant  [facility] - Cardiology Stepdown

## 2023-04-03 NOTE — PROGRESS NOTES
Heart Transplant pt attempted to speak to pt to complete hospital admission note, pt not available at this time. Heart Transplant Social Workers will follow.

## 2023-04-03 NOTE — PROGRESS NOTES
[facility] - [facility] Stepdown  Cardiothoracic Surgery  Evaluation and Management/VAD interrogation       Patient Name: Kevan Queen  MRN: [id number]  Admission Date: 4/2/2023  Hospital Length of Stay: 1 days  Code Status: Full Code   Attending Physician: Marlo Marques MD   Referring Provider: Elba Dominguez MD  Principal Problem:Acute on chronic combined systolic and diastolic heart failure            Subjective:     Post-Op Info:  * No surgery found *         Subjective:     Post-Op Info:  * No surgery found *              Interval History: patient admitted for running out of primacor infusion, needing refill. Continue home regimen.   Implant 6/29/2022    Medications:  Continuous Infusions:   milrinone 20mg/100ml D5W (200mcg/ml) 0.25 mcg/kg/min (04/03/23 0151)     Scheduled Meds:   famotidine  40 mg Oral Daily    [START ON 4/4/2023] furosemide (LASIX) injection  40 mg Intravenous Daily    gabapentin  100 mg Oral TID    insulin aspart U-100  10 Units Subcutaneous TIDWM    insulin detemir U-100  15 Units Subcutaneous BID    INV ASPIRIN 100MG/PLACEBO  100 mg Oral Daily    levETIRAcetam  1,000 mg Oral BID    methocarbamoL  500 mg Oral QID    mupirocin   Nasal BID    spironolactone  25 mg Oral Daily    warfarin  6 mg Oral Every Mon, Wed, Fri    [START ON 4/4/2023] warfarin  4.5 mg Oral Every Tues, Thurs, Sat, Sun     PRN Meds:cyclobenzaprine, dextrose 10%, dextrose 10%, dextrose, dextrose, glucagon (human recombinant), insulin aspart U-100, oxyCODONE-acetaminophen     Objective:     Vital Signs (Most Recent):  Temp: 98.3 °F (36.8 °C) (04/03/23 0520)  Pulse: 109 (04/03/23 0330)  Resp: 16 (04/03/23 0520)  BP: (!) 94/0 (04/03/23 0529)  SpO2: 96 % (04/03/23 0520)   Vital Signs (24h Range):  Temp:  [97.7 °F (36.5 °C)-98.3 °F (36.8 °C)] 98.3 °F (36.8 °C)  Pulse:  [] 109  Resp:  [14-20] 16  SpO2:  [96 %-99 %] 96 %  BP: ()/(0-80) 94/0       Intake/Output Summary (Last 24 hours) at 4/3/2023 1044  Last  data filed at 4/3/2023 0925  Gross per 24 hour   Intake 960 ml   Output 3900 ml   Net -2940 ml       Physical Exam  Constitutional:       Appearance: Normal appearance.   HENT:      Head: Normocephalic and atraumatic.   Eyes:      Pupils: Pupils are equal, round, and reactive to light.   Cardiovascular:      Rate and Rhythm: Normal rate.      Comments: LVAD hum is smooth  Pulmonary:      Effort: Pulmonary effort is normal.   Abdominal:      General: Abdomen is flat.   Musculoskeletal:         General: Normal range of motion.      Cervical back: Normal range of motion.   Skin:     General: Skin is warm and dry.      Capillary Refill: Capillary refill takes less than 2 seconds.   Neurological:      General: No focal deficit present.      Mental Status: He is alert.       Significant Labs:  ABGs:   Recent Labs   Lab 04/03/23 0441   PH 7.333*   PCO2 47.5*   PO2 29*   HCO3 25.2   POCSATURATED 50*   BE -1     Amylase: No results for input(s): AMYLASE in the last 48 hours.  BMP:   Recent Labs   Lab 04/03/23 0444   *      K 3.6      CO2 23   BUN 13   CREATININE 1.4   CALCIUM 8.9   MG 2.0     Cardiac markers:   Recent Labs   Lab 04/02/23  0515   TROPONINI 0.121*     CBC:   Recent Labs   Lab 04/03/23 0444   WBC 9.15   RBC 4.08*   HGB 10.6*   HCT 33.6*      MCV 82   MCH 26.0*   MCHC 31.5*     CMP:   Recent Labs   Lab 04/03/23 0444   *   CALCIUM 8.9   ALBUMIN 3.7   PROT 7.5      K 3.6   CO2 23      BUN 13   CREATININE 1.4   ALKPHOS 107   ALT 16   AST 25   BILITOT 0.9     Coagulation:   Recent Labs   Lab 04/03/23 0444   INR 1.6*   APTT 29.4     Lactic Acid:   Recent Labs   Lab 04/02/23  1414   LACTATE 1.3     LFTs:   Recent Labs   Lab 04/03/23 0444   ALT 16   AST 25   ALKPHOS 107   BILITOT 0.9   PROT 7.5   ALBUMIN 3.7     Lipase: No results for input(s): LIPASE in the last 48 hours.    Significant Diagnostics:  I have reviewed all pertinent imaging results/findings within the  past 24 hours.    Procedure: Device Interrogation Including analysis of device parameters  Current Settings: Ventricular Assist Device  Review of device function is stable  TXP LVAD INTERROGATIONS 4/3/2023 4/3/2023 4/2/2023 4/2/2023 4/2/2023 4/2/2023 2/2/2023   Type HeartMate3 HeartMate3 HeartMate3 HeartMate3 HeartMate3 HeartMate3 -   Flow 3.9 4.2 3.8 3.4 3.6 3.5 -   Speed 5100 5100 5100 5100 5100 5100 -   PI 6.1 4.6 5.8 6.9 7.8 7.9 -   Power (Serna) 3.6 3.7 3.8 2.9 2.8 2.7 -   LSL 4700 4700 4700 4700 4700 4700 -   Low Flow Alarm - - - - - - -   High Power Alarm - - - - - - -   Pulsatility Intermittent pulse No Pulse No Pulse - No Pulse - No Pulse         Assessment/Plan:     LVAD (left ventricular assist device) present  - Interval History was not obtained attending team member  during rounding today.  - VAD/ANABELLE teaching was not performed with patient.  - Mobilization/Physical Therapy is ongoing.  - Anticoagulation ongoing  - patient admitted due to primacor infusion ran out.   - WBC 9.15  - H/H 10.6/33.6  - INR 1.6  - BUN/Cr 13/1.4  - tbili 0.9  -   - net neg 3L   - gtt: milirone 0.25  -  On home regimen. Hypervolemic on exam yest, IVP lasix 80 given yesterday with good UO. Patient does not want to be on high dose lasix due to leg cramps. Would prefer home dose lasix 40 daily     Of which more than 50 percent of the care dominated counseling and coordinating care with different team members. The VAD was interrogated and all parameters were WNL and no significant findings were found in the history. All these findings are documented in the note above.        Fay Lay PA-C  Cardiothoracic Surgery  [facility] - [facility] Step[facility]

## 2023-04-03 NOTE — ASSESSMENT & PLAN NOTE
- NICM s/p HM3-DT (6/2022)  - Continue with Milrinone 0.25 and check hemoydnamics  - Repeat Echocardiogram pending   - Home Diuresis: Lasix 40 mg, daily (per patient)  - Patient appears hypervolemic on exam with elevated JVP; CVP 18 on admit and was given 80 mg IVP Lasix x 1  - CVP 16 this AM. Net -ve 3L since admit. Pt prefers 40 mg IVP Lasix as higher doses make him cramp. Will give single dose of 40 mg IVP and monitor response, consider redosing tonight  - Monitor electrolytes closely. Maintain Mg >2 and K >4   - HF Pathway: Sodium restriction <2 g, Fluid restriction < 1500 mL, Strict I/Os, Daily weights

## 2023-04-03 NOTE — PROGRESS NOTES
04/03/2023  Miracle Caballero    Current provider:  Marlo Marques MD    Device interrogation:  TXP LVAD INTERROGATIONS 4/3/2023 4/3/2023 4/3/2023 4/3/2023 4/2/2023 4/2/2023 4/2/2023   Type HeartMate3 HeartMate3 HeartMate3 HeartMate3 HeartMate3 HeartMate3 HeartMate3   Flow 3.5 3.7 3.9 4.2 3.8 3.4 3.6   Speed 5100 5150 5100 5100 5100 5100 5100   PI 6.8 6.9 6.1 4.6 5.8 6.9 7.8   Power (Serna) 3.6 3.6 3.6 3.7 3.8 2.9 2.8   LSL - 4700 4700 4700 4700 4700 4700   Low Flow Alarm - - - - - - -   High Power Alarm - - - - - - -   Pulsatility - Intermittent pulse Intermittent pulse No Pulse No Pulse - No Pulse          Rounded on Kevan Queen to ensure all mechanical assist device settings (IABP or VAD) were appropriate and all parameters were within limits.  I was able to ensure all back up equipment was present, the staff had no issues, and the Perfusion Department daily rounding was complete.      For implantable VADs: Interrogation of Ventricular assist device was performed with analysis of device parameters and review of device function. I have personally reviewed the interrogation findings and agree with findings as stated.     In emergency, the nursing units have been notified to contact the perfusion department either by:  Calling [phone] from 630am to 4pm Mon thru Fri, utilizing the On-Call Finder functionality of Epic and searching for Perfusion, or by contacting the hospital  from 4pm to 630am and on weekends and asking to speak with the perfusionist on call.    12:35 PM

## 2023-04-03 NOTE — PROGRESS NOTES
04/02/2023  Casey Arizmendi    Current provider:  Marlo Marques MD    Device interrogation:  TXP LVAD INTERROGATIONS 4/2/2023 4/2/2023 4/2/2023 2/2/2023 12/16/2022 12/16/2022 12/15/2022   Type HeartMate3 HeartMate3 HeartMate3 - HeartMate3 HeartMate3 HeartMate3   Flow 3.4 3.6 3.5 - 3.6 3.7 3.9   Speed 5100 5100 5100 - 5100 5100 5100   PI 6.9 7.8 7.9 - 7.7 7.1 6.2   Power (Serna) 2.9 2.8 2.7 - 3.8 3.8 3.9   LSL 4700 4700 4700 - 4700 4700 4700   Low Flow Alarm - - - - - - -   High Power Alarm - - - - - - -   Pulsatility - No Pulse - No Pulse Intermittent pulse Intermittent pulse -          Rounded on Kevan Queen to ensure all mechanical assist device settings (IABP or VAD) were appropriate and all parameters were within limits.  I was able to ensure all back up equipment was present, the staff had no issues, and the Perfusion Department daily rounding was complete.      For implantable VADs: Interrogation of Ventricular assist device was performed with analysis of device parameters and review of device function. I have personally reviewed the interrogation findings and agree with findings as stated.     In emergency, the nursing units have been notified to contact the perfusion department either by:  Calling [phone] from 630am to 4pm Mon thru Fri, utilizing the On-Call Finder functionality of Epic and searching for Perfusion, or by contacting the hospital  from 4pm to 630am and on weekends and asking to speak with the perfusionist on call.    8:43 PM

## 2023-04-03 NOTE — ASSESSMENT & PLAN NOTE
-HeartMate 3 Implanted on 6/29/2022 as DT  -Continue Coumadin, Goal INR 2.0-3.0. Subherapeutic today: 1.7.   -Antiplatelets: INV Aspirin (Alon Trial)  -LDH is stable overall today.  Will continue to monitor daily.  -Speed set at 5100, LSL 4700 rpm  -ECHO pending. Last ECHO 9/1/22 with IV midline, AV does not open, Moderate-severe reduced RVSF, PASP 22, CVP 3, LVEDD 5.97 with LVAD speed 5100. IV milrinone increased from 0 .125 to 0.25 after RHC on 10/31/22    Procedure: Device Interrogation Including analysis of device parameters  Current Settings: Ventricular Assist Device  Review of device function is stable/unstable stable    TXP LVAD INTERROGATIONS 4/3/2023 4/3/2023 4/2/2023 4/2/2023 4/2/2023 4/2/2023 2/2/2023   Type HeartMate3 HeartMate3 HeartMate3 HeartMate3 HeartMate3 HeartMate3 -   Flow 3.9 4.2 3.8 3.4 3.6 3.5 -   Speed 5100 5100 5100 5100 5100 5100 -   PI 6.1 4.6 5.8 6.9 7.8 7.9 -   Power (Serna) 3.6 3.7 3.8 2.9 2.8 2.7 -   LSL 4700 4700 4700 4700 4700 4700 -   Low Flow Alarm - - - - - - -   High Power Alarm - - - - - - -   Pulsatility Intermittent pulse No Pulse No Pulse - No Pulse - No Pulse

## 2023-04-03 NOTE — PLAN OF CARE
[facility]   Heart Transplant/VAD Clinic   [address] [place] [address]   [phone] after hours          [phone] fax     VAD HOME  HEALTH ORDERS      Admit to Home Health    Diagnosis:   Patient Active Problem List   Diagnosis    Anxiety disorder, unspecified    Anemia of chronic disease    Ecstasy abuse    Cannabis use disorder, severe, dependence    Acute on chronic combined systolic and diastolic heart failure    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    Type 2 diabetes mellitus with hyperglycemia    Insomnia    SOB (shortness of breath)    Familial dilated cardiomyopathy    LVAD (left ventricular assist device) present    Tingling in extremities    Seizure-like activity    Temporal lobe seizure    Examination of participant in clinical trial    Awareness alteration, transient    Right heart failure secondary to left heart failure-post LVAD surgery    History of substance abuse    Hyperglycemia       Patient is homebound due to:  NYHA Class IV HF. S/P LVAD placement.     Diet: Low Fat, Low cholesterol, 2Gm Na, Coumadin restrictions.    Acitivities: No Swimming, bathing, vacuuming, contact sports.    Fresh implants= Sternal Precautions    Nursing:   SN to complete comprehensive assessment including routine vital signs. Instruct on disease process and s/s of complications to report to MD. Review/verify medication list sent home with the patient at time of discharge  and instruct patient/caregiver as needed. Frequency may be adjusted depending on start of care date.    **LVAD driveline exit site dressing change is to be completed per LVAD patient/caregiver only**.    Notify MD if:  SBP > 120 or < 80;   MAP > 80 or < 65;   HR > 120 or < 60;   Temp > 101;   Weight gain >3lbs in 1 day or 5lbs in 1 week.    LABS:  SN to perform labs: PT/INR per Coumadin clinic [phone].   Follow up INR date: 4/7/2023  No Finger Sticks    CBC, CMP, Mag, BNP weekly  starting 4/7/2023    HOME INFUSION THERAPY:   SN to perform Infusion Therapy/Central Line Care.  Review Central Line Care & Central Line Flush with patient.    Administer (drug and dose):   Continuous IV milrinone 0.25 mcg/kg/min, dosing weight 107 kg    Central line care:  Scrub the Hub: Prior to accessing the line, always perform a 30 second alcohol scrub  Each lumen of the central line is to be flushed at least daily with 10 mL Normal Saline and 3 mL Heparin flush (100 units/mL)  Skilled Nurse (SN) may draw blood from IV access  Blood Draw Procedure:   - Aspirate at least 5 mL of blood   - Discard   - Obtain specimen   - Change posiflow cap   - Flush with 20 mL Normal Saline followed by a                 3-5 mL Heparin flush (100 units/mL)  Central :   - Sterile dressing changes are done weekly and as needed.   - Use chlor-hexadine scrub to cleanse site, apply Biopatch to insertion site,       apply securement device dressing   - Posi-flow caps are changed weekly and after EVERY lab draw.   - If sterile gauze is under dressing to control oozing,                 dressing change must be performed every 24 hours until gauze is not needed.        Send initial Home Health orders to [facility] attending physician on call.  Send follow up questions to VAD clinic MD [phone] or [phone].

## 2023-04-03 NOTE — PLAN OF CARE
PT evaluation completed- see note for details. No acute functional deficits identified upon evaluation. Once medically stable, recommending pt discharge home with no further physical therapy needs anticipated.       4/3/2023

## 2023-04-03 NOTE — PT/OT/SLP EVAL
Occupational Therapy   Evaluation and Discharge Note    Name: Kevan Queen  MRN: [id number]  Admitting Diagnosis: Acute on chronic combined systolic and diastolic heart failure  Recent Surgery: * No surgery found *      Recommendations:     Discharge Recommendations: home  Discharge Equipment Recommendations: none  Barriers to discharge:  None    Assessment:     Kevan Queen is a 38 y.o. male with a medical diagnosis of Acute on chronic combined systolic and diastolic heart failure. At this time, patient is functioning at their prior level of function and does not require further acute OT services.     Plan:     During this hospitalization, patient does not require further acute OT services.  Please re-consult if situation changes.    Plan of Care Reviewed with: patient    Subjective     Chief Complaint: No complaints   Patient/Family Comments/goals: Return home    Occupational Profile:  Living Environment: Pt lives in a 2sh w/ family and does ~10 steps to 2nd floor.  Previous level of function: Indep, but did state he becomes fatigued at times when ascending the stairs.  Roles and Routines: N/A  Equipment Used at home: none  Assistance upon Discharge: Pt has assistance upon D/C.     Pain/Comfort:  Pain Rating 1: 0/10  Pain Rating Post-Intervention 1: 0/10    Patients cultural, spiritual, Taoist conflicts given the current situation:      Objective:     Communicated with: RN prior to session.  Patient found HOB elevated with LVAD, peripheral IV upon OT entry to room.    General Precautions: Standard, fall  Orthopedic Precautions: N/A  Braces: N/A  Respiratory Status: Room air     Occupational Performance:    Bed Mobility:    Patient completed Scooting/Bridging with independence  Patient completed Supine to Sit with independence  Patient completed Sit to Supine with independence    Functional Mobility/Transfers:  Patient completed Sit <> Stand Transfer with independence  with  no assistive device   Functional  Mobility: Pt ambulated ~30 ft indep.     Activities of Daily Living:  Lower Body Dressing: independence      Cognitive/Visual Perceptual:  Cognitive/Psychosocial Skills:  -       Oriented to: Person, Place, Time, and Situation   -       Follows Commands/attention:Follows multistep  commands  -       Communication: clear/fluent  -       Memory: No Deficits noted  -       Safety awareness/insight to disability: intact   -       Mood/Affect/Coping skills/emotional control: Appropriate to situation  Visual/Perceptual:  -Intact      Physical Exam:  Balance:    -       Indep  Postural examination/scapula alignment: -       Rounded shoulders  Skin integrity: Visible skin intact  Upper Extremity Range of Motion:  -       Right Upper Extremity: WFL  -       Left Upper Extremity: WFL  Upper Extremity Strength: -       Right Upper Extremity: WFL  -       Left Upper Extremity: WFL   Strength: -       Right Upper Extremity: WFL  -       Left Upper Extremity: WFL  Fine Motor Coordination: -       Intact  Gross motor coordination: WFL    AMPAC 6 Click ADL:  AMPAC Total Score: 24    Treatment & Education:  Pt educated on:    POC & overall coordination of care   D/C recs  Early mobility  Importance of oob activities  Importance of participating in therapy  Possible benefits of therapy      Patient left HOB elevated with all lines intact and call button in reach    GOALS:   Multidisciplinary Problems       Occupational Therapy Goals       Not on file                    History:     Past Medical History:   Diagnosis Date    Arthritis     Cardiomyopathy     CHF (congestive heart failure) 10/01/2020    Diabetes mellitus     Dilated cardiomyopathy 10/26/2020    Drug abuse 10/2020    Hyperosmolar hyperglycemic state (HHS) 5/25/2022    ICD (implantable cardioverter-defibrillator) in place 10/26/2020    Muscle cramping 6/15/2022    Renal disorder          Past Surgical History:   Procedure Laterality Date    APPLICATION OF WOUND  VACUUM-ASSISTED CLOSURE DEVICE N/A 6/30/2022    Procedure: APPLICATION, WOUND VAC;  Surgeon: Luis F Paige MD;  Location: [facility] OR 2ND FLR;  Service: Cardiovascular;  Laterality: N/A;  50 x 5 cm    CARDIAC DEFIBRILLATOR PLACEMENT      IMPLANTATION OF RIGHT VENTRICULAR ASSIST DEVICE (RVAD) N/A 6/29/2022    Procedure: INSERTION, RVAD;  Surgeon: Luis F Paige MD;  Location: [facility] OR 2ND FLR;  Service: Cardiovascular;  Laterality: N/A;    INSERTION OF GRAFT TO PERICARDIUM Right 6/30/2022    Procedure: INSERTION-RIGHT VENTRICULAR ASSIST DEVICE;  Surgeon: Luis F Paige MD;  Location: [facility] OR 2ND FLR;  Service: Cardiovascular;  Laterality: Right;    IRRIGATION OF MEDIASTINUM  6/30/2022    Procedure: IRRIGATION, MEDIASTINUM;  Surgeon: Luis F Paige MD;  Location: [facility] OR 2ND FLR;  Service: Cardiovascular;;    LEFT VENTRICULAR ASSIST DEVICE Left 6/23/2022    Procedure: INSERTION-LEFT VENTRICULAR ASSIST DEVICE;  Surgeon: Luis F Paige MD;  Location: [facility] OR 2ND FLR;  Service: Cardiovascular;  Laterality: Left;    LEFT VENTRICULAR ASSIST DEVICE N/A 6/29/2022    Procedure: INSERTION-LEFT VENTRICULAR ASSIST DEVICE;  Surgeon: Luis F Paige MD;  Location: [facility] OR 2ND FLR;  Service: Cardiovascular;  Laterality: N/A;    RIGHT HEART CATHETERIZATION Right 4/8/2022    Procedure: INSERTION, CATHETER, RIGHT HEART;  Surgeon: Luca Lopez Jr., MD;  Location: [facility] CATH LAB;  Service: Cardiology;  Laterality: Right;    RIGHT HEART CATHETERIZATION Right 4/19/2022    Procedure: INSERTION, CATHETER, RIGHT HEART;  Surgeon: Josh Pulido MD;  Location: [facility] CATH LAB;  Service: Cardiology;  Laterality: Right;    RIGHT HEART CATHETERIZATION Right 7/21/2022    Procedure: INSERTION, CATHETER, RIGHT HEART;  Surgeon: Dalia Crum MD;  Location: [facility] CATH LAB;  Service: Cardiology;  Laterality: Right;    RIGHT HEART CATHETERIZATION Right 10/31/2022    Procedure: INSERTION, CATHETER, RIGHT HEART;  Surgeon: Dalia Crum MD;   Location: [facility] CATH LAB;  Service: Cardiology;  Laterality: Right;    STERNAL WOUND CLOSURE N/A 6/30/2022    Procedure: CLOSURE, WOUND, STERNUM;  Surgeon: LuisF Paige MD;  Location: [facility] OR [address] [place] [address];  Service: Cardiovascular;  Laterality: N/A;       Time Tracking:     OT Date of Treatment: 04/03/23  OT Start Time: 0936  OT Stop Time: 0941  OT Total Time (min): 5 min    Billable Minutes:Evaluation 5 minutes    4/3/2023

## 2023-04-03 NOTE — ASSESSMENT & PLAN NOTE
Endocrinology consulted for BG management.   BG goal 140-180    30% reduction in home regimen  - Levemir (Insulin Detemir) 15 units BID  - Novolog (Insulin Aspart) 10 units TIDWM and prn for BG excursions [facility] SSI (150/25)  - BG checks //0200  - Hypoglycemia protocol in place    ** Please notify Endocrine for any change and/or advance in diet**  ** Please call Endocrine for any BG related issues **    Discharge Planning:   TBD. Please notify endocrinology prior to discharge.

## 2023-04-03 NOTE — ASSESSMENT & PLAN NOTE
- Interval History was not obtained attending team member  during rounding today.  - VAD/ANABELLE teaching was not performed with patient.  - Mobilization/Physical Therapy is ongoing.  - Anticoagulation ongoing  - patient admitted due to primacor infusion ran out.   - WBC 9.15  - H/H 10.6/33.6  - INR 1.6  - BUN/Cr 13/1.4  - tbili 0.9  -   - net neg 3L   - gtt: milirone 0.25  -  On home regimen. Hypervolemic on exam yest, IVP lasix 80 given yesterday with good UO. Patient does not want to be on high dose lasix due to leg cramps. Would prefer home dose lasix 40 daily     Of which more than 50 percent of the care dominated counseling and coordinating care with different team members. The VAD was interrogated and all parameters were WNL and no significant findings were found in the history. All these findings are documented in the note above.

## 2023-04-04 LAB
ANION GAP SERPL CALC-SCNC: 9 MMOL/L (ref 8–16)
APTT PPP: 29.8 SEC (ref 21–32)
ASCENDING AORTA: 2.97 CM
BASOPHILS # BLD AUTO: 0.04 K/UL (ref 0–0.2)
BASOPHILS NFR BLD: 0.4 % (ref 0–1.9)
BSA FOR ECHO PROCEDURE: 2.37 M2
BUN SERPL-MCNC: 12 MG/DL (ref 6–20)
CALCIUM SERPL-MCNC: 9.2 MG/DL (ref 8.7–10.5)
CHLORIDE SERPL-SCNC: 104 MMOL/L (ref 95–110)
CO2 SERPL-SCNC: 25 MMOL/L (ref 23–29)
CREAT SERPL-MCNC: 1.4 MG/DL (ref 0.5–1.4)
CV ECHO LV RWT: 0.21 CM
DIFFERENTIAL METHOD: ABNORMAL
DOP CALC LVOT AREA: 4.2 CM2
DOP CALC LVOT DIAMETER: 2.31 CM
E WAVE DECELERATION TIME: 198.79 MSEC
E/A RATIO: 1.19
E/E' RATIO: 9 M/S
ECHO LV POSTERIOR WALL: 0.74 CM (ref 0.6–1.1)
EJECTION FRACTION: 15 %
EOSINOPHIL # BLD AUTO: 0.2 K/UL (ref 0–0.5)
EOSINOPHIL NFR BLD: 2 % (ref 0–8)
ERYTHROCYTE [DISTWIDTH] IN BLOOD BY AUTOMATED COUNT: 16.9 % (ref 11.5–14.5)
EST. GFR  (NO RACE VARIABLE): >60 ML/MIN/1.73 M^2
FRACTIONAL SHORTENING: 9 % (ref 28–44)
GLUCOSE SERPL-MCNC: 167 MG/DL (ref 70–110)
HCT VFR BLD AUTO: 35.3 % (ref 40–54)
HGB BLD-MCNC: 11 G/DL (ref 14–18)
IMM GRANULOCYTES # BLD AUTO: 0.02 K/UL (ref 0–0.04)
IMM GRANULOCYTES NFR BLD AUTO: 0.2 % (ref 0–0.5)
INR PPP: 1.7 (ref 0.8–1.2)
INTERVENTRICULAR SEPTUM: 0.67 CM (ref 0.6–1.1)
LA MAJOR: 4.75 CM
LA MINOR: 5.01 CM
LA WIDTH: 5.29 CM
LDH SERPL L TO P-CCNC: 259 U/L (ref 110–260)
LEFT ATRIUM SIZE: 2.9 CM
LEFT ATRIUM VOLUME INDEX MOD: 57.2 ML/M2
LEFT ATRIUM VOLUME INDEX: 27.1 ML/M2
LEFT ATRIUM VOLUME MOD: 134.32 CM3
LEFT ATRIUM VOLUME: 63.59 CM3
LEFT INTERNAL DIMENSION IN SYSTOLE: 6.53 CM (ref 2.1–4)
LEFT VENTRICLE DIASTOLIC VOLUME INDEX: 103.36 ML/M2
LEFT VENTRICLE DIASTOLIC VOLUME: 242.9 ML
LEFT VENTRICLE MASS INDEX: 94 G/M2
LEFT VENTRICLE SYSTOLIC VOLUME INDEX: 92.8 ML/M2
LEFT VENTRICLE SYSTOLIC VOLUME: 218.04 ML
LEFT VENTRICULAR INTERNAL DIMENSION IN DIASTOLE: 7.2 CM (ref 3.5–6)
LEFT VENTRICULAR MASS: 221.1 G
LV LATERAL E/E' RATIO: 7.88 M/S
LV SEPTAL E/E' RATIO: 10.5 M/S
LYMPHOCYTES # BLD AUTO: 2.7 K/UL (ref 1–4.8)
LYMPHOCYTES NFR BLD: 28.4 % (ref 18–48)
MAGNESIUM SERPL-MCNC: 1.9 MG/DL (ref 1.6–2.6)
MCH RBC QN AUTO: 26 PG (ref 27–31)
MCHC RBC AUTO-ENTMCNC: 31.2 G/DL (ref 32–36)
MCV RBC AUTO: 84 FL (ref 82–98)
MONOCYTES # BLD AUTO: 0.9 K/UL (ref 0.3–1)
MONOCYTES NFR BLD: 8.9 % (ref 4–15)
MV PEAK A VEL: 0.53 M/S
MV PEAK E VEL: 0.63 M/S
MV STENOSIS PRESSURE HALF TIME: 57.65 MS
MV VALVE AREA P 1/2 METHOD: 3.82 CM2
NEUTROPHILS # BLD AUTO: 5.8 K/UL (ref 1.8–7.7)
NEUTROPHILS NFR BLD: 60.1 % (ref 38–73)
NRBC BLD-RTO: 0 /100 WBC
PISA TR MAX VEL: 2.07 M/S
PLATELET # BLD AUTO: 289 K/UL (ref 150–450)
PMV BLD AUTO: 11 FL (ref 9.2–12.9)
POCT GLUCOSE: 105 MG/DL (ref 70–110)
POCT GLUCOSE: 106 MG/DL (ref 70–110)
POCT GLUCOSE: 170 MG/DL (ref 70–110)
POCT GLUCOSE: 182 MG/DL (ref 70–110)
POTASSIUM SERPL-SCNC: 3.9 MMOL/L (ref 3.5–5.1)
PROTHROMBIN TIME: 17.2 SEC (ref 9–12.5)
RA MAJOR: 4.71 CM
RA PRESSURE: 15 MMHG
RA WIDTH: 4.38 CM
RBC # BLD AUTO: 4.23 M/UL (ref 4.6–6.2)
RIGHT VENTRICULAR END-DIASTOLIC DIMENSION: 5.36 CM
RV TISSUE DOPPLER FREE WALL SYSTOLIC VELOCITY 1 (APICAL 4 CHAMBER VIEW): 5.07 CM/S
SINUS: 3.35 CM
SODIUM SERPL-SCNC: 138 MMOL/L (ref 136–145)
STJ: 2.68 CM
TDI LATERAL: 0.08 M/S
TDI SEPTAL: 0.06 M/S
TDI: 0.07 M/S
TR MAX PG: 17 MMHG
TRICUSPID ANNULAR PLANE SYSTOLIC EXCURSION: 1.68 CM
TV REST PULMONARY ARTERY PRESSURE: 32 MMHG
WBC # BLD AUTO: 9.57 K/UL (ref 3.9–12.7)

## 2023-04-04 PROCEDURE — 63600175 PHARM REV CODE 636 W HCPCS: Performed by: PHYSICIAN ASSISTANT

## 2023-04-04 PROCEDURE — 94761 N-INVAS EAR/PLS OXIMETRY MLT: CPT

## 2023-04-04 PROCEDURE — 83735 ASSAY OF MAGNESIUM: CPT | Performed by: STUDENT IN AN ORGANIZED HEALTH CARE EDUCATION/TRAINING PROGRAM

## 2023-04-04 PROCEDURE — 25000003 PHARM REV CODE 250: Performed by: PHYSICIAN ASSISTANT

## 2023-04-04 PROCEDURE — 99233 SBSQ HOSP IP/OBS HIGH 50: CPT | Mod: FS,,, | Performed by: PHYSICIAN ASSISTANT

## 2023-04-04 PROCEDURE — 27000248 HC VAD-ADDITIONAL DAY

## 2023-04-04 PROCEDURE — 85025 COMPLETE CBC W/AUTO DIFF WBC: CPT | Performed by: STUDENT IN AN ORGANIZED HEALTH CARE EDUCATION/TRAINING PROGRAM

## 2023-04-04 PROCEDURE — 99233 SBSQ HOSP IP/OBS HIGH 50: CPT | Mod: ,,, | Performed by: INTERNAL MEDICINE

## 2023-04-04 PROCEDURE — 93750 INTERROGATION VAD IN PERSON: CPT | Mod: ,,, | Performed by: INTERNAL MEDICINE

## 2023-04-04 PROCEDURE — 80048 BASIC METABOLIC PNL TOTAL CA: CPT | Performed by: STUDENT IN AN ORGANIZED HEALTH CARE EDUCATION/TRAINING PROGRAM

## 2023-04-04 PROCEDURE — G0378 HOSPITAL OBSERVATION PER HR: HCPCS

## 2023-04-04 PROCEDURE — 93750 PR INTERROGATE VENT ASSIST DEV, IN PERSON, W PHYSICIAN ANALYSIS: ICD-10-PCS | Mod: ,,, | Performed by: INTERNAL MEDICINE

## 2023-04-04 PROCEDURE — 20600001 HC STEP DOWN PRIVATE ROOM

## 2023-04-04 PROCEDURE — 99233 PR SUBSEQUENT HOSPITAL CARE,LEVL III: ICD-10-PCS | Mod: FS,,, | Performed by: PHYSICIAN ASSISTANT

## 2023-04-04 PROCEDURE — 85730 THROMBOPLASTIN TIME PARTIAL: CPT | Performed by: STUDENT IN AN ORGANIZED HEALTH CARE EDUCATION/TRAINING PROGRAM

## 2023-04-04 PROCEDURE — 25000003 PHARM REV CODE 250: Performed by: STUDENT IN AN ORGANIZED HEALTH CARE EDUCATION/TRAINING PROGRAM

## 2023-04-04 PROCEDURE — 85610 PROTHROMBIN TIME: CPT | Performed by: STUDENT IN AN ORGANIZED HEALTH CARE EDUCATION/TRAINING PROGRAM

## 2023-04-04 PROCEDURE — 83615 LACTATE (LD) (LDH) ENZYME: CPT | Performed by: STUDENT IN AN ORGANIZED HEALTH CARE EDUCATION/TRAINING PROGRAM

## 2023-04-04 PROCEDURE — 99233 PR SUBSEQUENT HOSPITAL CARE,LEVL III: ICD-10-PCS | Mod: ,,, | Performed by: INTERNAL MEDICINE

## 2023-04-04 PROCEDURE — 25000003 PHARM REV CODE 250: Performed by: INTERNAL MEDICINE

## 2023-04-04 PROCEDURE — 63600175 PHARM REV CODE 636 W HCPCS: Performed by: STUDENT IN AN ORGANIZED HEALTH CARE EDUCATION/TRAINING PROGRAM

## 2023-04-04 PROCEDURE — 27000685 HC LVAD KIT (30 DAY SUPPLY)

## 2023-04-04 RX ORDER — LANOLIN ALCOHOL/MO/W.PET/CERES
400 CREAM (GRAM) TOPICAL ONCE
Status: COMPLETED | OUTPATIENT
Start: 2023-04-04 | End: 2023-04-04

## 2023-04-04 RX ORDER — HYDRALAZINE HYDROCHLORIDE 50 MG/1
50 TABLET, FILM COATED ORAL ONCE
Status: COMPLETED | OUTPATIENT
Start: 2023-04-04 | End: 2023-04-04

## 2023-04-04 RX ORDER — HYDRALAZINE HYDROCHLORIDE 10 MG/1
10 TABLET, FILM COATED ORAL 3 TIMES DAILY PRN
Status: DISCONTINUED | OUTPATIENT
Start: 2023-04-04 | End: 2023-04-07 | Stop reason: HOSPADM

## 2023-04-04 RX ADMIN — MILRINONE LACTATE IN DEXTROSE 0.25 MCG/KG/MIN: 200 INJECTION, SOLUTION INTRAVENOUS at 04:04

## 2023-04-04 RX ADMIN — INSULIN DETEMIR 15 UNITS: 100 INJECTION, SOLUTION SUBCUTANEOUS at 09:04

## 2023-04-04 RX ADMIN — GABAPENTIN 100 MG: 100 CAPSULE ORAL at 09:04

## 2023-04-04 RX ADMIN — LEVETIRACETAM 1000 MG: 500 TABLET, FILM COATED ORAL at 09:04

## 2023-04-04 RX ADMIN — Medication 100 MG: at 08:04

## 2023-04-04 RX ADMIN — OXYCODONE HYDROCHLORIDE AND ACETAMINOPHEN 1 TABLET: 5; 325 TABLET ORAL at 10:04

## 2023-04-04 RX ADMIN — MUPIROCIN: 20 OINTMENT TOPICAL at 09:04

## 2023-04-04 RX ADMIN — INSULIN ASPART 10 UNITS: 100 INJECTION, SOLUTION INTRAVENOUS; SUBCUTANEOUS at 05:04

## 2023-04-04 RX ADMIN — SPIRONOLACTONE 25 MG: 25 TABLET, FILM COATED ORAL at 08:04

## 2023-04-04 RX ADMIN — LEVETIRACETAM 1000 MG: 500 TABLET, FILM COATED ORAL at 08:04

## 2023-04-04 RX ADMIN — MILRINONE LACTATE IN DEXTROSE 0.25 MCG/KG/MIN: 200 INJECTION, SOLUTION INTRAVENOUS at 03:04

## 2023-04-04 RX ADMIN — HYDRALAZINE HYDROCHLORIDE 50 MG: 50 TABLET ORAL at 11:04

## 2023-04-04 RX ADMIN — INSULIN ASPART 10 UNITS: 100 INJECTION, SOLUTION INTRAVENOUS; SUBCUTANEOUS at 08:04

## 2023-04-04 RX ADMIN — GABAPENTIN 100 MG: 100 CAPSULE ORAL at 08:04

## 2023-04-04 RX ADMIN — INSULIN ASPART 2 UNITS: 100 INJECTION, SOLUTION INTRAVENOUS; SUBCUTANEOUS at 05:04

## 2023-04-04 RX ADMIN — HYDRALAZINE HYDROCHLORIDE 10 MG: 10 TABLET, FILM COATED ORAL at 05:04

## 2023-04-04 RX ADMIN — GABAPENTIN 100 MG: 100 CAPSULE ORAL at 03:04

## 2023-04-04 RX ADMIN — FUROSEMIDE 40 MG: 10 INJECTION, SOLUTION INTRAMUSCULAR; INTRAVENOUS at 08:04

## 2023-04-04 RX ADMIN — INSULIN ASPART 2 UNITS: 100 INJECTION, SOLUTION INTRAVENOUS; SUBCUTANEOUS at 08:04

## 2023-04-04 RX ADMIN — INSULIN DETEMIR 15 UNITS: 100 INJECTION, SOLUTION SUBCUTANEOUS at 08:04

## 2023-04-04 RX ADMIN — WARFARIN SODIUM 4.5 MG: 2.5 TABLET ORAL at 05:04

## 2023-04-04 RX ADMIN — FAMOTIDINE 40 MG: 20 TABLET ORAL at 08:04

## 2023-04-04 RX ADMIN — Medication 400 MG: at 03:04

## 2023-04-04 RX ADMIN — INSULIN ASPART 10 UNITS: 100 INJECTION, SOLUTION INTRAVENOUS; SUBCUTANEOUS at 12:04

## 2023-04-04 NOTE — PROGRESS NOTES
Admit Note     Met with patient and significant other to assess needs. Patient is a 38 y.o. single male, admitted for acute on chronic combined systolic and diastolic heart failure,  Pt had LVAD implant on 6/2022.  Pt ran out of his milrinone at home due to missing appt with [facility].  Pt stated that he told [facility] are that he was coming to [facility] on 3/23 for appt and would have PICC care and [place] [facility] [place].           Patient admitted to [facility]  on 4/2/2023 .  At this time, patient presents as alert and oriented x 4, good eye contact, calm and communicative.      Household/Family Systems     Patient resides with his significant other and three minor children (3,5, and 17) at the pt's mothers house located at:      [address] [place] [address].       Support system includes significant other, mother and minor children.  Pt's children in the home are cared for by family when pt/significant other are in the hospital.   The pt has seven children, two with the pt's significant other (3 that live with them.)  The pt's other children live with the their mothers.     Pt reports his significant other Niharika Wright.   Patients primary caregiver is self with support of significant other.      Pt's cell: [phone]    Emergency contacts:   Niharika Wright (significant other, drives) [phone]  Malou Dumont (mother, drives) [phone]    During admission, patient's caregiver plans to stay in patient's room.  Confirmed patient and patients caregivers do have access to reliable transportation.  Pt and significant other drive.     Cognitive Status/Learning     Patient reports reading ability as college and states patient does not have difficulty with reading, writing, hearing, comprehension, learning and memory.  Pt does have difficulty with eyesight due to diabetic issues. Patient reports patient learns best by multiple methods.    Needed: No.   Highest education level:  Attended College/Technical School    Vocation/Disability   .  Working for Income: No  If no, reason not working: Demands of Treatment  Patient has applied for disability and has a  assisting with same.   Pt used to work as a CNA.   Pt's significant other works for [employer].   Pt's mother receives a monthly income and assists pt with financial needs.     Adherence     Patient reports a high level of adherence to patients health care regimen.  Adherence counseling and education provided. Patient verbalizes understanding.    Substance Use    Patient reports the following substance usage.    Tobacco: Pt denies current use.   Alcohol: none, patient denies any use.  Illicit Drugs/Non-prescribed Medications: no current use.  Pt reports he stopped using Marijuana on 11/2021, cocaine 7/2021 and ecstasy on 9/2020.    Patient states clear understanding of the potential impact of substance use.  Substance abstinence/cessation counseling, education and resources provided and reviewed.     Services Utilizing/ADLS    Infusion Service: Prior to admission, patient utilizing? yes, [facility], [facility] full service.  Pt would like to use the same IV company if needed when discharged.  [phone], [phone]-fax  Home Health: Prior to admission, patient utilizing? no, Pt uses [facility].   DME: Prior to admission, no  Pulmonary/Cardiac Rehab: Prior to admission, no  Dialysis:  Prior to admission, no  Transplant Specialty Pharmacy:  Prior to admission, no.    Prior to admission, patient reports patient was somewhat  independent with ADLS.  Pt reports continued difficulty with his shoulder, he requires assistance for dressing and bathing.  Pt and significant other drive.   Patient reports patient is not able to care for self at this time due to compromised medical condition (as documented in medical record) and physical weakness..  Patient indicates a willingness to care for self once  medically cleared to do so.    Insurance/Medications    Insured by   Payer/Plan Subscr  Sex Relation Sub. Ins. ID Effective Group Num   1. MEDICAID - HE* JOSE J DAMICO 1985 Male Self [id number] 3/1/20 [id number]                                   [address] [address]      Primary Insurance (for UNOS reporting): Public Insurance - Medicaid  Secondary Insurance (for UNOS reporting): None    Patient reports patient is able to obtain and afford medications at this time and at time of discharge.    Living Will/Healthcare Power of     Patient states patient has a LW and/or HCPA. Pt reported that his mother is his HCPA.  Copy on file per pt.    Coping/Mental Health    Patient is coping adequately with the aid of  family members. Patient indicates mental health difficulties.   Pt reports a history of depression, anxiety and anger and is taking Buspar.  Pt stated that he is followed by Palliative Care in [place]. Pt is not involved in out pt counseling.   Pt reports he utilizes music and nature to help him relax.       Discharge Planning    At time of discharge, patient plans to return to patient's home under the care of self, significant other and mother.  Patients  family  will transport patient.  Per rounds today, expected discharge date has not been medically determined at this time. Patient and patients caregiver  verbalize understanding and are involved in treatment planning and discharge process.  Stressed importance of staying in touch with [facility] and not running out of his meds.  Pt's contact info has changed.  SW stressed importance of keeping [facility] updated on any changes.  Pt voiced understanding and agreement.  SW spoke with [facility] rep (Zulma).  They are able to accept pt back, but will have him sign acknowledging he is high risk now and will be discontinued on services if he fails to f/up again.  SW discussed this with pt.  Pt voiced understanding and agreement to this.     Additional  Concerns    Patient's caretaker denies additional needs and/or concerns at this time.  providing ongoing psychosocial support, education, resources and d/c planning as needed.  SW remains available. Patient's caregiver verbalizes understanding and agreement with information reviewed,  availability and how to access available resources as needed. Patient denies additional needs and/or concerns at this time. Patient verbalizes understanding and agreement with information reviewed, social work availability, and how to access available resources as needed.

## 2023-04-04 NOTE — SUBJECTIVE & OBJECTIVE
Interval History: NAEON. No complaints. Diuresed well with 40 mg IVP Lasix x 1 yesterday. CVP 13. Net -ve 2.9 L/24 hours, will continue the same today.       Continuous Infusions:   milrinone 20mg/100ml D5W (200mcg/ml) 0.25 mcg/kg/min (04/04/23 0358)     Scheduled Meds:   famotidine  40 mg Oral Daily    furosemide (LASIX) injection  40 mg Intravenous Daily    gabapentin  100 mg Oral TID    insulin aspart U-100  10 Units Subcutaneous TIDWM    insulin detemir U-100  15 Units Subcutaneous BID    INV ASPIRIN 100MG/PLACEBO  100 mg Oral Daily    levETIRAcetam  1,000 mg Oral BID    mupirocin   Nasal BID    spironolactone  25 mg Oral Daily    warfarin  6 mg Oral Every Mon, Wed, Fri    warfarin  4.5 mg Oral Every Tues, Thurs, Sat, Sun     PRN Meds:cyclobenzaprine, dextrose 10%, dextrose 10%, dextrose, dextrose, glucagon (human recombinant), insulin aspart U-100, oxyCODONE-acetaminophen    Review of patient's allergies indicates:   Allergen Reactions    Aspirin Other (See Comments)     Mr. Thacker is enrolled in Dr. Paige's Alon Trial and cannot have any aspirin and/or aspirin-containing products. DO NOT cancel any orders for INV Aspirin 100 mg/Placebo. If you have questions, please contact Isabel @ [phone], [phone],[email or web address], secure chat or MS Teams message.    Bumex [bumetanide] Hives    Lactose Diarrhea     Other reaction(s): Abdominal distension, gaseous    Torsemide Hives     Objective:     Vital Signs (Most Recent):  Temp: 98 °F (36.7 °C) (04/04/23 0800)  Pulse: 68 (04/04/23 0551)  Resp: 18 (04/04/23 0551)  BP: (!) 86/0 (04/04/23 0800)  SpO2: 97 % (04/04/23 0800)   Vital Signs (24h Range):  Temp:  [41 °F (5 °C)-98.6 °F (37 °C)] 98 °F (36.7 °C)  Pulse:  [] 68  Resp:  [16-18] 18  SpO2:  [96 %-99 %] 97 %  BP: (86-90)/(0) 86/0     Patient Vitals for the past 72 hrs (Last 3 readings):   Weight   04/04/23 0800 106.5 kg (234 lb 12.6 oz)   04/03/23 0700 105.6 kg (232 lb 12.9 oz)   04/02/23 1220 108.3  kg (238 lb 12.1 oz)       Body mass index is 29.35 kg/m².      Intake/Output Summary (Last 24 hours) at 4/4/2023 0936  Last data filed at 4/4/2023 0503  Gross per 24 hour   Intake 444 ml   Output 2450 ml   Net -2006 ml         Hemodynamic Parameters:  CVP:  [13 mmHg] 13 mmHg      Physical Exam  Vitals and nursing note reviewed.   HENT:      Head: Normocephalic and atraumatic.      Nose: Nose normal.   Eyes:      Conjunctiva/sclera: Conjunctivae normal.   Cardiovascular:      Rate and Rhythm: Normal rate and regular rhythm.      Comments: Smooth VAD hum   Pulmonary:      Effort: Pulmonary effort is normal.   Abdominal:      General: Abdomen is flat. There is no distension.   Musculoskeletal:         General: Normal range of motion.      Cervical back: Normal range of motion.   Skin:     General: Skin is warm.   Neurological:      General: No focal deficit present.      Mental Status: He is alert.   Psychiatric:         Mood and Affect: Mood normal.         Behavior: Behavior normal.         Thought Content: Thought content normal.         Judgment: Judgment normal.       Significant Labs:  CBC:  Recent Labs   Lab 04/02/23  1151 04/03/23  0444 04/04/23  0450   WBC 9.31 9.15 9.57   RBC 4.21* 4.08* 4.23*   HGB 10.8* 10.6* 11.0*   HCT 34.9* 33.6* 35.3*    269 289   MCV 83 82 84   MCH 25.7* 26.0* 26.0*   MCHC 30.9* 31.5* 31.2*       BNP:  Recent Labs   Lab 04/02/23  0515   .4*       CMP:  Recent Labs   Lab 04/02/23  0515 04/02/23  1151 04/02/23  1151 04/02/23  1614 04/03/23  0444 04/04/23  0449   GLU  --  177*   < > 152* 195* 167*   CALCIUM 9.1 8.9   < > 9.6 8.9 9.2   ALBUMIN 3.7 3.7  --   --  3.7  --    PROT  --  7.5  --   --  7.5  --     139   < > 138 137 138   K 4.1 3.4*   < > 3.8 3.6 3.9   CO2 27 24   < > 26 23 25   CL  --  105   < > 101 103 104   BUN 9.1 10   < > 9 13 12   CREATININE 1.59* 1.3   < > 1.3 1.4 1.4   ALKPHOS 92 101  --   --  107  --    ALT 15 15  --   --  16  --    AST 21 22  --   --   25  --    BILITOT 1.7* 1.2*  --   --  0.9  --     < > = values in this interval not displayed.        Coagulation:   Recent Labs   Lab 04/02/23  1151 04/03/23  0444 04/04/23  0450   INR 1.7* 1.6* 1.7*   APTT 31.2 29.4 29.8       LDH:  Recent Labs   Lab 04/02/23  1151 04/03/23  0444 04/04/23  0449   * 281* 259       Microbiology:  Microbiology Results (last 7 days)       ** No results found for the last 168 hours. **            I have reviewed all pertinent labs within the past 24 hours.    Estimated Creatinine Clearance: 94.4 mL/min (based on SCr of 1.4 mg/dL).    Diagnostic Results:  I have reviewed all pertinent imaging results/findings within the past 24 hours.

## 2023-04-04 NOTE — CARE UPDATE
-Glucose Goal 140-180    -A1C:   Hemoglobin A1C   Date Value Ref Range Status   04/02/2023 7.9 (H) 4.0 - 5.6 % Final     Comment:     ADA Screening Guidelines:  5.7-6.4%  Consistent with prediabetes  >or=6.5%  Consistent with diabetes    High levels of fetal hemoglobin interfere with the HbA1C  assay. Heterozygous hemoglobin variants (HbS, HgC, etc)do  not significantly interfere with this assay.   However, presence of multiple variants may affect accuracy.           -HOME REGIMEN: -Levemir 22 units BID.   -Novolog 16 units TIDWM in addition to the following correction scale:     150 - 200 + 1 unit     201 - 250 + 2 units     251 - 300 + 3 units     301 - 350 + 4 units      > 350   + 5 units    -GLUCOSE TREND FOR THE PAST 24HRS:   Recent Labs   Lab 04/02/23  2056 04/03/23  0137 04/03/23  0832 04/03/23  1202 04/03/23  1645 04/03/23  2041   POCTGLUCOSE 120* 134* 186* 105 108 136*         -NO HYPOGYCEMIAS NOTED     - Diet  Diet Cardiac    T2DM on insulin at home.   BG currently stable on regimen.      Plan:   - Continue Levemir (Insulin Detemir) 15 units BID  - Continue Novolog (Insulin Aspart) 10 units TIDWM   - Continue Novolog [facility] (150/25)  - BG checks AC/HS/0200  - Hypoglycemia protocol in place      ** Please notify Endocrine for any change and/or advance in diet**  ** Please call Endocrine for any BG related issues **     Discharge Planning:   TBD. Please notify endocrinology prior to discharge.

## 2023-04-04 NOTE — PROGRESS NOTES
[facility]  Heart Transplant  Progress Note    Patient Name: Kevan Queen  MRN: [id number]  Admission Date: 4/2/2023  Hospital Length of Stay: 2 days  Attending Physician: Marlo Marques MD  Primary Care Provider: ORALIA Cline  Principal Problem:Acute on chronic combined systolic and diastolic heart failure    Subjective:     Interval History: NAEON. No complaints. Diuresed well with 40 mg IVP Lasix x 1 yesterday. CVP 13. Net -ve 2.9 L/24 hours, will continue the same today.       Continuous Infusions:   milrinone 20mg/100ml D5W (200mcg/ml) 0.25 mcg/kg/min (04/04/23 0358)     Scheduled Meds:   famotidine  40 mg Oral Daily    furosemide (LASIX) injection  40 mg Intravenous Daily    gabapentin  100 mg Oral TID    insulin aspart U-100  10 Units Subcutaneous TIDWM    insulin detemir U-100  15 Units Subcutaneous BID    INV ASPIRIN 100MG/PLACEBO  100 mg Oral Daily    levETIRAcetam  1,000 mg Oral BID    mupirocin   Nasal BID    spironolactone  25 mg Oral Daily    warfarin  6 mg Oral Every Mon, Wed, Fri    warfarin  4.5 mg Oral Every Tues, Thurs, Sat, Sun     PRN Meds:cyclobenzaprine, dextrose 10%, dextrose 10%, dextrose, dextrose, glucagon (human recombinant), insulin aspart U-100, oxyCODONE-acetaminophen    Review of patient's allergies indicates:   Allergen Reactions    Aspirin Other (See Comments)     Mr. Thacker is enrolled in Dr. Paige's Alon Trial and cannot have any aspirin and/or aspirin-containing products. DO NOT cancel any orders for INV Aspirin 100 mg/Placebo. If you have questions, please contact Isabel @ [phone], [phone],[email or web address], secure chat or MS Teams message.    Bumex [bumetanide] Hives    Lactose Diarrhea     Other reaction(s): Abdominal distension, gaseous    Torsemide Hives     Objective:     Vital Signs (Most Recent):  Temp: 98 °F (36.7 °C) (04/04/23 0800)  Pulse: 68 (04/04/23 0551)  Resp: 18 (04/04/23 0551)  BP: (!) 86/0 (04/04/23  0800)  SpO2: 97 % (04/04/23 0800)   Vital Signs (24h Range):  Temp:  [41 °F (5 °C)-98.6 °F (37 °C)] 98 °F (36.7 °C)  Pulse:  [] 68  Resp:  [16-18] 18  SpO2:  [96 %-99 %] 97 %  BP: (86-90)/(0) 86/0     Patient Vitals for the past 72 hrs (Last 3 readings):   Weight   04/04/23 0800 106.5 kg (234 lb 12.6 oz)   04/03/23 0700 105.6 kg (232 lb 12.9 oz)   04/02/23 1220 108.3 kg (238 lb 12.1 oz)       Body mass index is 29.35 kg/m².      Intake/Output Summary (Last 24 hours) at 4/4/2023 0936  Last data filed at 4/4/2023 0503  Gross per 24 hour   Intake 444 ml   Output 2450 ml   Net -2006 ml         Hemodynamic Parameters:  CVP:  [13 mmHg] 13 mmHg      Physical Exam  Vitals and nursing note reviewed.   HENT:      Head: Normocephalic and atraumatic.      Nose: Nose normal.   Eyes:      Conjunctiva/sclera: Conjunctivae normal.   Cardiovascular:      Rate and Rhythm: Normal rate and regular rhythm.      Comments: Smooth VAD hum   Pulmonary:      Effort: Pulmonary effort is normal.   Abdominal:      General: Abdomen is flat. There is no distension.   Musculoskeletal:         General: Normal range of motion.      Cervical back: Normal range of motion.   Skin:     General: Skin is warm.   Neurological:      General: No focal deficit present.      Mental Status: He is alert.   Psychiatric:         Mood and Affect: Mood normal.         Behavior: Behavior normal.         Thought Content: Thought content normal.         Judgment: Judgment normal.       Significant Labs:  CBC:  Recent Labs   Lab 04/02/23  1151 04/03/23  0444 04/04/23  0450   WBC 9.31 9.15 9.57   RBC 4.21* 4.08* 4.23*   HGB 10.8* 10.6* 11.0*   HCT 34.9* 33.6* 35.3*    269 289   MCV 83 82 84   MCH 25.7* 26.0* 26.0*   MCHC 30.9* 31.5* 31.2*       BNP:  Recent Labs   Lab 04/02/23  0515   .4*       CMP:  Recent Labs   Lab 04/02/23  0515 04/02/23  1151 04/02/23  1151 04/02/23  1614 04/03/23  0444 04/04/23  0449   GLU  --  177*   < > 152* 195* 167*    CALCIUM 9.1 8.9   < > 9.6 8.9 9.2   ALBUMIN 3.7 3.7  --   --  3.7  --    PROT  --  7.5  --   --  7.5  --     139   < > 138 137 138   K 4.1 3.4*   < > 3.8 3.6 3.9   CO2 27 24   < > 26 23 25   CL  --  105   < > 101 103 104   BUN 9.1 10   < > 9 13 12   CREATININE 1.59* 1.3   < > 1.3 1.4 1.4   ALKPHOS 92 101  --   --  107  --    ALT 15 15  --   --  16  --    AST 21 22  --   --  25  --    BILITOT 1.7* 1.2*  --   --  0.9  --     < > = values in this interval not displayed.        Coagulation:   Recent Labs   Lab 04/02/23  1151 04/03/23  0444 04/04/23  0450   INR 1.7* 1.6* 1.7*   APTT 31.2 29.4 29.8       LDH:  Recent Labs   Lab 04/02/23  1151 04/03/23  0444 04/04/23  0449   * 281* 259       Microbiology:  Microbiology Results (last 7 days)       ** No results found for the last 168 hours. **            I have reviewed all pertinent labs within the past 24 hours.    Estimated Creatinine Clearance: 94.4 mL/min (based on SCr of 1.4 mg/dL).    Diagnostic Results:  I have reviewed all pertinent imaging results/findings within the past 24 hours.    Assessment and Plan:     38 yo black male with stage d HFrEF, NICM, ? 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 He underwent RVAD removal and chest closure 6/30/2022.  He also completed a course of IV Abx for staph epi. He was weaned off  but he had to restarted due to RVF. He was eventually transitioned to milrinone (secondary to  shortage) now on 0.25 mcg/kg/min. He also has a history of unclear syncope vs seizures and is followed by neuro for this and is on Keppra. Patient presents today as a transfer from [facility] after he presented there after he called about running out of his milrinone. Wife was at bedside that states, she has been in contact with  everyone but a few weeks ago she had changed her phone number and lost contact at one point with someone in infusion. Patient overall denies any complaints, celebrated his birthday yesterday. He was lat seen in clinic with Dr. Villatoro on 3/23. Reports compliance with all his medication. He does however, report that he takes his lasix but not 80 mg, daily he started taking 40 mg, daily and at times he wouldn't because he feels dry. Last took his Lasix ?Friday. Patient denies any fever, chills, nausea, vomiting, hematemesis, cough, chest pain, palpitations, shortness of breath, abdominal pain/distension, changes in bowel or urinary habits, no hematochezia or melena.      * Acute on chronic combined systolic and diastolic heart failure  - [facility] s/p HM3-DT (6/2022)  - Continue with Milrinone 0.25 and check hemoydnamics  - Repeat Echocardiogram pending   - Home Diuresis: Lasix 40 mg, daily (per patient)  - Patient appears hypervolemic on exam with elevated JVP; CVP 18 on admit and was given 80 mg IVP Lasix x 1.   -Given 40 mg IVP Lasix yesterday since patient c/o cramping with 80 mg IVP. Net -ve 2.9 L/24 hours.   - CVP 13 this AM. Will continue with 40 mg IVP Lasix QD  - Monitor electrolytes closely. Maintain Mg >2 and K >4   - HF Pathway: Sodium restriction <2 g, Fluid restriction < 1500 mL, Strict I/Os, Daily weights    Temporal lobe seizure  - Resume Keppra  - Seizure precautions    LVAD (left ventricular assist device) present  -HeartMate 3 Implanted on 6/29/2022 as DT  -Continue Coumadin, Goal INR 2.0-3.0. Subherapeutic today: 1.7.   -Antiplatelets: INV Aspirin (Alon Trial)  -LDH is stable overall today.  Will continue to monitor daily.  -Speed set at 5100, LSL 4700 rpm  -ECHO pending. Last ECHO 9/1/22 with IV midline, AV does not open, Moderate-severe reduced RVSF, PASP 22, CVP 3, LVEDD 5.97 with LVAD speed 5100. IV milrinone increased from 0 .125 to 0.25 after RHC on 10/31/22    Procedure: Device Interrogation  Including analysis of device parameters  Current Settings: Ventricular Assist Device  Review of device function is stable/unstable stable    TXP LVAD INTERROGATIONS 4/4/2023 4/4/2023 4/3/2023 4/3/2023 4/3/2023 4/3/2023 4/3/2023   Type HeartMate3 HeartMate3 HeartMate3 HeartMate3 HeartMate3 HeartMate3 HeartMate3   Flow 3.1 4.0 - 4.2 3.5 3.7 3.9   Speed 5100 5100 5100 5100 5100 5150 5100   PI 98.8 5.3 6.4 5.3 6.8 6.9 6.1   Power (Serna) 3.7 3.6 3.7 3.7 3.6 3.6 3.6   LSL 4700 - 4700 - - 4700 4700   Low Flow Alarm - - - - - - -   High Power Alarm - - - - - - -   Pulsatility Intermittent pulse - Intermittent pulse - - Intermittent pulse Intermittent pulse       Type 2 diabetes mellitus with hyperglycemia  - Last A1c: 9.2, Repeat A1c  - Home Regimen: Detemir 22 units BID; Aspart 14 TID  - Will start Levemir 15 units, BID and Aspart 5 TID and up-titrate while inpatient  - Endocrinology Consult        Denise Espinoza PA-C  Heart Transplant  [facility] - Cardiology Stepdown

## 2023-04-04 NOTE — PROGRESS NOTES
04/04/2023  Mirela Perez    Current provider:  Marlo Marques MD    Device interrogation:  TXP LVAD INTERROGATIONS 4/4/2023 4/3/2023 4/3/2023 4/3/2023 4/3/2023 4/3/2023 4/3/2023   Type HeartMate3 HeartMate3 HeartMate3 HeartMate3 HeartMate3 HeartMate3 HeartMate3   Flow 4.0 - 4.2 3.5 3.7 3.9 4.2   Speed 5100 5100 5100 5100 5150 5100 5100   PI 5.3 6.4 5.3 6.8 6.9 6.1 4.6   Power (Serna) 3.6 3.7 3.7 3.6 3.6 3.6 3.7   LSL - 4700 - - 4700 4700 4700   Low Flow Alarm - - - - - - -   High Power Alarm - - - - - - -   Pulsatility - Intermittent pulse - - Intermittent pulse Intermittent pulse No Pulse          Rounded on Kevan Queen to ensure all mechanical assist device settings (IABP or VAD) were appropriate and all parameters were within limits.  I was able to ensure all back up equipment was present, the staff had no issues, and the Perfusion Department daily rounding was complete.      For implantable VADs: Interrogation of Ventricular assist device was performed with analysis of device parameters and review of device function. I have personally reviewed the interrogation findings and agree with findings as stated.     In emergency, the nursing units have been notified to contact the perfusion department either by:  Calling [phone] from 630am to 4pm Mon thru Fri, utilizing the On-Call Finder functionality of Epic and searching for Perfusion, or by contacting the hospital  from 4pm to 630am and on weekends and asking to speak with the perfusionist on call.    6:24 AM

## 2023-04-04 NOTE — NURSING TRANSFER
Nursing Transfer Note      4/4/2023     Reason patient is being transferred: Echo    Transfer From: U [id number]    Transfer via stretcher    Medicines sent: Milrinone cont infusion (8 mL/hr)    Any special needs or follow-up needed: none    Chart send with patient: No    Notified: spouse

## 2023-04-04 NOTE — PROGRESS NOTES
[facility]  Cardiothoracic Surgery  Evaluation and Management/VAD interrogation       Patient Name: Kevan Queen  MRN: [id number]  Admission Date: 4/2/2023  Hospital Length of Stay: 2 days  Code Status: Full Code   Attending Physician: Marlo Marques MD   Referring Provider: Elba Dominguez MD  Principal Problem:Acute on chronic combined systolic and diastolic heart failure            Subjective:     Post-Op Info:  * No surgery found *         Subjective:     Post-Op Info:  * No surgery found *              Interval History: no acute changes.   Implant 6/29/22    Medications:  Continuous Infusions:   milrinone 20mg/100ml D5W (200mcg/ml) 0.25 mcg/kg/min (04/04/23 0358)     Scheduled Meds:   famotidine  40 mg Oral Daily    furosemide (LASIX) injection  40 mg Intravenous Daily    gabapentin  100 mg Oral TID    insulin aspart U-100  10 Units Subcutaneous TIDWM    insulin detemir U-100  15 Units Subcutaneous BID    INV ASPIRIN 100MG/PLACEBO  100 mg Oral Daily    levETIRAcetam  1,000 mg Oral BID    mupirocin   Nasal BID    spironolactone  25 mg Oral Daily    warfarin  6 mg Oral Every Mon, Wed, Fri    warfarin  4.5 mg Oral Every Tues, Thurs, Sat, Sun     PRN Meds:cyclobenzaprine, dextrose 10%, dextrose 10%, dextrose, dextrose, glucagon (human recombinant), insulin aspart U-100, oxyCODONE-acetaminophen     Objective:     Vital Signs (Most Recent):  Temp: 97.6 °F (36.4 °C) (04/04/23 1148)  Pulse: 92 (04/04/23 1126)  Resp: 14 (04/04/23 1148)  BP: (!) 86/0 (04/04/23 0800)  SpO2: 97 % (04/04/23 0800)   Vital Signs (24h Range):  Temp:  [41 °F (5 °C)-98.6 °F (37 °C)] 97.6 °F (36.4 °C)  Pulse:  [] 92  Resp:  [14-18] 14  SpO2:  [97 %-99 %] 97 %  BP: (86-90)/(0) 86/0       Intake/Output Summary (Last 24 hours) at 4/4/2023 1155  Last data filed at 4/4/2023 1029  Gross per 24 hour   Intake 684 ml   Output 3650 ml   Net -2966 ml       Physical Exam  Constitutional:       Appearance: Normal  appearance.   HENT:      Head: Normocephalic.   Eyes:      Pupils: Pupils are equal, round, and reactive to light.   Cardiovascular:      Rate and Rhythm: Normal rate.      Comments: LVAD hum is smooth  Pulmonary:      Effort: Pulmonary effort is normal.   Abdominal:      General: Abdomen is flat.   Skin:     General: Skin is warm and dry.   Neurological:      General: No focal deficit present.      Mental Status: He is alert.       Significant Labs:  ABGs:   Recent Labs   Lab 04/03/23  2100   PH 7.333*   PCO2 53.2*   PO2 24*   HCO3 28.2*   POCSATURATED 38*   BE 2     Amylase: No results for input(s): AMYLASE in the last 48 hours.  BMP:   Recent Labs   Lab 04/04/23  0449   *      K 3.9      CO2 25   BUN 12   CREATININE 1.4   CALCIUM 9.2   MG 1.9     Cardiac markers: No results for input(s): CKMB, CPKMB, TROPONINT, TROPONINI, MYOGLOBIN in the last 48 hours.  CBC:   Recent Labs   Lab 04/04/23  0450   WBC 9.57   RBC 4.23*   HGB 11.0*   HCT 35.3*      MCV 84   MCH 26.0*   MCHC 31.2*     CMP:   Recent Labs   Lab 04/03/23  0444 04/04/23  0449   * 167*   CALCIUM 8.9 9.2   ALBUMIN 3.7  --    PROT 7.5  --     138   K 3.6 3.9   CO2 23 25    104   BUN 13 12   CREATININE 1.4 1.4   ALKPHOS 107  --    ALT 16  --    AST 25  --    BILITOT 0.9  --      Coagulation:   Recent Labs   Lab 04/04/23  0450   INR 1.7*   APTT 29.8     Lactic Acid:   Recent Labs   Lab 04/02/23  1414   LACTATE 1.3     LFTs:   Recent Labs   Lab 04/03/23  0444   ALT 16   AST 25   ALKPHOS 107   BILITOT 0.9   PROT 7.5   ALBUMIN 3.7     Lipase: No results for input(s): LIPASE in the last 48 hours.    Significant Diagnostics:  I have reviewed all pertinent imaging results/findings within the past 24 hours.    Procedure: Device Interrogation Including analysis of device parameters  Current Settings: Ventricular Assist Device  Review of device function is stable  TXP LVAD INTERROGATIONS 4/4/2023 4/4/2023 4/4/2023 4/3/2023  4/3/2023 4/3/2023 4/3/2023   Type HeartMate3 HeartMate3 HeartMate3 HeartMate3 HeartMate3 HeartMate3 HeartMate3   Flow 3.9 3.1 4.0 - 4.2 3.5 3.7   Speed 5150 5100 5100 5100 5100 5100 5150   PI 6.7 98.8 5.3 6.4 5.3 6.8 6.9   Power (Serna) 3.8 3.7 3.6 3.7 3.7 3.6 3.6   LSL 4700 4700 - 4700 - - 4700   Low Flow Alarm - - - - - - -   High Power Alarm - - - - - - -   Pulsatility Intermittent pulse Intermittent pulse - Intermittent pulse - - Intermittent pulse         Assessment/Plan:     LVAD (left ventricular assist device) present  - Interval History was not obtained attending team member  during rounding today.  - VAD/ANABELLE teaching was not performed with patient.  - Mobilization/Physical Therapy is ongoing.  - Anticoagulation ongoing  - patient admitted due to primacor infusion ran out.   - WBC 9.5  - H/H 11/35  - INR 1.7  - BUN/Cr 12/1.4  - LDH 2.59  - net neg 2.8   - gtt: milirone 0.25  -  On home regimen. Hypervolemic on exam yest, IVP lasix 80 given yesterday with good UO. Patient does not want to be on high dose lasix due to leg cramps. Would prefer home dose lasix 40 daily   - no acute changes     Of which more than 50 percent of the care dominated counseling and coordinating care with different team members. The VAD was interrogated and all parameters were WNL and no significant findings were found in the history. All these findings are documented in the note above.        Fay Lay PA-C  Cardiothoracic Surgery  [facility] - [facility]

## 2023-04-04 NOTE — ASSESSMENT & PLAN NOTE
- Interval History was not obtained attending team member  during rounding today.  - VAD/ANABELLE teaching was not performed with patient.  - Mobilization/Physical Therapy is ongoing.  - Anticoagulation ongoing  - patient admitted due to primacor infusion ran out.   - WBC 9.5  - H/H 11/35  - INR 1.7  - BUN/Cr 12/1.4  - LDH 2.59  - net neg 2.8   - gtt: milirone 0.25  -  On home regimen. Hypervolemic on exam yest, IVP lasix 80 given yesterday with good UO. Patient does not want to be on high dose lasix due to leg cramps. Would prefer home dose lasix 40 daily   - no acute changes     Of which more than 50 percent of the care dominated counseling and coordinating care with different team members. The VAD was interrogated and all parameters were WNL and no significant findings were found in the history. All these findings are documented in the note above.

## 2023-04-04 NOTE — NURSING
Offered to change patient's dressing. Patient stated it was changed late last night by his wife and would change it later.

## 2023-04-04 NOTE — ASSESSMENT & PLAN NOTE
-HeartMate 3 Implanted on 6/29/2022 as DT  -Continue Coumadin, Goal INR 2.0-3.0. Subherapeutic today: 1.7.   -Antiplatelets: INV Aspirin (Alon Trial)  -LDH is stable overall today.  Will continue to monitor daily.  -Speed set at 5100, LSL 4700 rpm  -ECHO pending. Last ECHO 9/1/22 with IV midline, AV does not open, Moderate-severe reduced RVSF, PASP 22, CVP 3, LVEDD 5.97 with LVAD speed 5100. IV milrinone increased from 0 .125 to 0.25 after RHC on 10/31/22    Procedure: Device Interrogation Including analysis of device parameters  Current Settings: Ventricular Assist Device  Review of device function is stable/unstable stable    TXP LVAD INTERROGATIONS 4/4/2023 4/4/2023 4/3/2023 4/3/2023 4/3/2023 4/3/2023 4/3/2023   Type HeartMate3 HeartMate3 HeartMate3 HeartMate3 HeartMate3 HeartMate3 HeartMate3   Flow 3.1 4.0 - 4.2 3.5 3.7 3.9   Speed 5100 5100 5100 5100 5100 5150 5100   PI 98.8 5.3 6.4 5.3 6.8 6.9 6.1   Power (Serna) 3.7 3.6 3.7 3.7 3.6 3.6 3.6   LSL 4700 - 4700 - - 4700 4700   Low Flow Alarm - - - - - - -   High Power Alarm - - - - - - -   Pulsatility Intermittent pulse - Intermittent pulse - - Intermittent pulse Intermittent pulse

## 2023-04-04 NOTE — ASSESSMENT & PLAN NOTE
- NICM s/p HM3-DT (6/2022)  - Continue with Milrinone 0.25 and check hemoydnamics  - Repeat Echocardiogram pending   - Home Diuresis: Lasix 40 mg, daily (per patient)  - Patient appears hypervolemic on exam with elevated JVP; CVP 18 on admit and was given 80 mg IVP Lasix x 1.   -Given 40 mg IVP Lasix yesterday since patient c/o cramping with 80 mg IVP. Net -ve 2.9 L/24 hours.   - CVP 13 this AM. Will continue with 40 mg IVP Lasix QD  - Monitor electrolytes closely. Maintain Mg >2 and K >4   - HF Pathway: Sodium restriction <2 g, Fluid restriction < 1500 mL, Strict I/Os, Daily weights

## 2023-04-04 NOTE — SUBJECTIVE & OBJECTIVE
Subjective:     Post-Op Info:  * No surgery found *              Interval History: no acute changes.   Implant 6/29/22    Medications:  Continuous Infusions:   milrinone 20mg/100ml D5W (200mcg/ml) 0.25 mcg/kg/min (04/04/23 0358)     Scheduled Meds:   famotidine  40 mg Oral Daily    furosemide (LASIX) injection  40 mg Intravenous Daily    gabapentin  100 mg Oral TID    insulin aspart U-100  10 Units Subcutaneous TIDWM    insulin detemir U-100  15 Units Subcutaneous BID    INV ASPIRIN 100MG/PLACEBO  100 mg Oral Daily    levETIRAcetam  1,000 mg Oral BID    mupirocin   Nasal BID    spironolactone  25 mg Oral Daily    warfarin  6 mg Oral Every Mon, Wed, Fri    warfarin  4.5 mg Oral Every Tues, Thurs, Sat, Sun     PRN Meds:cyclobenzaprine, dextrose 10%, dextrose 10%, dextrose, dextrose, glucagon (human recombinant), insulin aspart U-100, oxyCODONE-acetaminophen     Objective:     Vital Signs (Most Recent):  Temp: 97.6 °F (36.4 °C) (04/04/23 1148)  Pulse: 92 (04/04/23 1126)  Resp: 14 (04/04/23 1148)  BP: (!) 86/0 (04/04/23 0800)  SpO2: 97 % (04/04/23 0800)   Vital Signs (24h Range):  Temp:  [41 °F (5 °C)-98.6 °F (37 °C)] 97.6 °F (36.4 °C)  Pulse:  [] 92  Resp:  [14-18] 14  SpO2:  [97 %-99 %] 97 %  BP: (86-90)/(0) 86/0       Intake/Output Summary (Last 24 hours) at 4/4/2023 1155  Last data filed at 4/4/2023 1029  Gross per 24 hour   Intake 684 ml   Output 3650 ml   Net -2966 ml       Physical Exam  Constitutional:       Appearance: Normal appearance.   HENT:      Head: Normocephalic.   Eyes:      Pupils: Pupils are equal, round, and reactive to light.   Cardiovascular:      Rate and Rhythm: Normal rate.      Comments: LVAD hum is smooth  Pulmonary:      Effort: Pulmonary effort is normal.   Abdominal:      General: Abdomen is flat.   Skin:     General: Skin is warm and dry.   Neurological:      General: No focal deficit present.      Mental Status: He is alert.       Significant Labs:  ABGs:   Recent Labs   Lab  04/03/23  2100   PH 7.333*   PCO2 53.2*   PO2 24*   HCO3 28.2*   POCSATURATED 38*   BE 2     Amylase: No results for input(s): AMYLASE in the last 48 hours.  BMP:   Recent Labs   Lab 04/04/23  0449   *      K 3.9      CO2 25   BUN 12   CREATININE 1.4   CALCIUM 9.2   MG 1.9     Cardiac markers: No results for input(s): CKMB, CPKMB, TROPONINT, TROPONINI, MYOGLOBIN in the last 48 hours.  CBC:   Recent Labs   Lab 04/04/23  0450   WBC 9.57   RBC 4.23*   HGB 11.0*   HCT 35.3*      MCV 84   MCH 26.0*   MCHC 31.2*     CMP:   Recent Labs   Lab 04/03/23  0444 04/04/23  0449   * 167*   CALCIUM 8.9 9.2   ALBUMIN 3.7  --    PROT 7.5  --     138   K 3.6 3.9   CO2 23 25    104   BUN 13 12   CREATININE 1.4 1.4   ALKPHOS 107  --    ALT 16  --    AST 25  --    BILITOT 0.9  --      Coagulation:   Recent Labs   Lab 04/04/23  0450   INR 1.7*   APTT 29.8     Lactic Acid:   Recent Labs   Lab 04/02/23  1414   LACTATE 1.3     LFTs:   Recent Labs   Lab 04/03/23  0444   ALT 16   AST 25   ALKPHOS 107   BILITOT 0.9   PROT 7.5   ALBUMIN 3.7     Lipase: No results for input(s): LIPASE in the last 48 hours.    Significant Diagnostics:  I have reviewed all pertinent imaging results/findings within the past 24 hours.    Procedure: Device Interrogation Including analysis of device parameters  Current Settings: Ventricular Assist Device  Review of device function is stable  TXP LVAD INTERROGATIONS 4/4/2023 4/4/2023 4/4/2023 4/3/2023 4/3/2023 4/3/2023 4/3/2023   Type HeartMate3 HeartMate3 HeartMate3 HeartMate3 HeartMate3 HeartMate3 HeartMate3   Flow 3.9 3.1 4.0 - 4.2 3.5 3.7   Speed 5150 5100 5100 5100 5100 5100 5150   PI 6.7 98.8 5.3 6.4 5.3 6.8 6.9   Power (Serna) 3.8 3.7 3.6 3.7 3.7 3.6 3.6   [facility] 4700 4700 - 4700 - - 4700   Low Flow Alarm - - - - - - -   High Power Alarm - - - - - - -   Pulsatility Intermittent pulse Intermittent pulse - Intermittent pulse - - Intermittent pulse

## 2023-04-04 NOTE — PLAN OF CARE
Pt educated on fall risk, pt remained free from falls/trauma/injury. Denies chest pain, SOB, palpitations or dizziness. Plan of care reviewed with pt. Bed locked in lowest position, call bell within reach, no acute distress noted, will continue to monitor.   No low flow alarms overnight.  RN educated pt and spouse on importance of changing LVAD dressing. Pt states wife would perform dressing change in AM.      Problem: Adult Inpatient Plan of Care  Goal: Plan of Care Review  Outcome: Ongoing, Progressing  Goal: Patient-Specific Goal (Individualized)  Outcome: Ongoing, Progressing  Goal: Absence of Hospital-Acquired Illness or Injury  Outcome: Ongoing, Progressing  Goal: Optimal Comfort and Wellbeing  Outcome: Ongoing, Progressing  Goal: Readiness for Transition of Care  Outcome: Ongoing, Progressing     Problem: Diabetes Comorbidity  Goal: Blood Glucose Level Within Targeted Range  Outcome: Ongoing, Progressing     Problem: Infection  Goal: Absence of Infection Signs and Symptoms  Outcome: Ongoing, Progressing     Problem: Fall Injury Risk  Goal: Absence of Fall and Fall-Related Injury  Outcome: Ongoing, Progressing

## 2023-04-05 LAB
ALBUMIN SERPL BCP-MCNC: 3.5 G/DL (ref 3.5–5.2)
ALLENS TEST: ABNORMAL
ALP SERPL-CCNC: 104 U/L (ref 55–135)
ALT SERPL W/O P-5'-P-CCNC: 16 U/L (ref 10–44)
ANION GAP SERPL CALC-SCNC: 8 MMOL/L (ref 8–16)
APTT PPP: 32.4 SEC (ref 21–32)
AST SERPL-CCNC: 26 U/L (ref 10–40)
BASOPHILS # BLD AUTO: 0.04 K/UL (ref 0–0.2)
BASOPHILS NFR BLD: 0.3 % (ref 0–1.9)
BILIRUB DIRECT SERPL-MCNC: 0.3 MG/DL (ref 0.1–0.3)
BILIRUB SERPL-MCNC: 0.8 MG/DL (ref 0.1–1)
BUN SERPL-MCNC: 13 MG/DL (ref 6–20)
CALCIUM SERPL-MCNC: 8.7 MG/DL (ref 8.7–10.5)
CHLORIDE SERPL-SCNC: 102 MMOL/L (ref 95–110)
CO2 SERPL-SCNC: 24 MMOL/L (ref 23–29)
CREAT SERPL-MCNC: 1.3 MG/DL (ref 0.5–1.4)
DIFFERENTIAL METHOD: ABNORMAL
EOSINOPHIL # BLD AUTO: 0.2 K/UL (ref 0–0.5)
EOSINOPHIL NFR BLD: 1.4 % (ref 0–8)
ERYTHROCYTE [DISTWIDTH] IN BLOOD BY AUTOMATED COUNT: 16.7 % (ref 11.5–14.5)
EST. GFR  (NO RACE VARIABLE): >60 ML/MIN/1.73 M^2
GLUCOSE SERPL-MCNC: 174 MG/DL (ref 70–110)
HCO3 UR-SCNC: 26.1 MMOL/L (ref 24–28)
HCT VFR BLD AUTO: 34.8 % (ref 40–54)
HCT VFR BLD CALC: 38 %PCV (ref 36–54)
HGB BLD-MCNC: 11 G/DL (ref 14–18)
IMM GRANULOCYTES # BLD AUTO: 0.02 K/UL (ref 0–0.04)
IMM GRANULOCYTES NFR BLD AUTO: 0.2 % (ref 0–0.5)
INR PPP: 1.8 (ref 0.8–1.2)
LDH SERPL L TO P-CCNC: 311 U/L (ref 110–260)
LYMPHOCYTES # BLD AUTO: 3.4 K/UL (ref 1–4.8)
LYMPHOCYTES NFR BLD: 28.7 % (ref 18–48)
MAGNESIUM SERPL-MCNC: 1.7 MG/DL (ref 1.6–2.6)
MCH RBC QN AUTO: 25.9 PG (ref 27–31)
MCHC RBC AUTO-ENTMCNC: 31.6 G/DL (ref 32–36)
MCV RBC AUTO: 82 FL (ref 82–98)
MONOCYTES # BLD AUTO: 1 K/UL (ref 0.3–1)
MONOCYTES NFR BLD: 8.1 % (ref 4–15)
NEUTROPHILS # BLD AUTO: 7.2 K/UL (ref 1.8–7.7)
NEUTROPHILS NFR BLD: 61.3 % (ref 38–73)
NRBC BLD-RTO: 0 /100 WBC
PCO2 BLDA: 47.1 MMHG (ref 35–45)
PH SMN: 7.35 [PH] (ref 7.35–7.45)
PLATELET # BLD AUTO: 292 K/UL (ref 150–450)
PMV BLD AUTO: 10.4 FL (ref 9.2–12.9)
PO2 BLDA: 30 MMHG (ref 40–60)
POC BE: 1 MMOL/L
POC SATURATED O2: 54 % (ref 95–100)
POC TCO2: 28 MMOL/L (ref 24–29)
POCT GLUCOSE: 168 MG/DL (ref 70–110)
POCT GLUCOSE: 172 MG/DL (ref 70–110)
POCT GLUCOSE: 98 MG/DL (ref 70–110)
POTASSIUM SERPL-SCNC: 3.8 MMOL/L (ref 3.5–5.1)
PROT SERPL-MCNC: 7.2 G/DL (ref 6–8.4)
PROTHROMBIN TIME: 17.9 SEC (ref 9–12.5)
RBC # BLD AUTO: 4.25 M/UL (ref 4.6–6.2)
SAMPLE: ABNORMAL
SITE: ABNORMAL
SODIUM SERPL-SCNC: 134 MMOL/L (ref 136–145)
WBC # BLD AUTO: 11.79 K/UL (ref 3.9–12.7)

## 2023-04-05 PROCEDURE — 99233 PR SUBSEQUENT HOSPITAL CARE,LEVL III: ICD-10-PCS | Mod: ,,, | Performed by: PHYSICIAN ASSISTANT

## 2023-04-05 PROCEDURE — 99233 SBSQ HOSP IP/OBS HIGH 50: CPT | Mod: ,,, | Performed by: INTERNAL MEDICINE

## 2023-04-05 PROCEDURE — 27000248 HC VAD-ADDITIONAL DAY

## 2023-04-05 PROCEDURE — 99233 SBSQ HOSP IP/OBS HIGH 50: CPT | Mod: ,,, | Performed by: PHYSICIAN ASSISTANT

## 2023-04-05 PROCEDURE — 25000003 PHARM REV CODE 250: Performed by: PHYSICIAN ASSISTANT

## 2023-04-05 PROCEDURE — 85730 THROMBOPLASTIN TIME PARTIAL: CPT | Performed by: STUDENT IN AN ORGANIZED HEALTH CARE EDUCATION/TRAINING PROGRAM

## 2023-04-05 PROCEDURE — 80076 HEPATIC FUNCTION PANEL: CPT | Performed by: STUDENT IN AN ORGANIZED HEALTH CARE EDUCATION/TRAINING PROGRAM

## 2023-04-05 PROCEDURE — 63600175 PHARM REV CODE 636 W HCPCS: Performed by: PHYSICIAN ASSISTANT

## 2023-04-05 PROCEDURE — 93750 PR INTERROGATE VENT ASSIST DEV, IN PERSON, W PHYSICIAN ANALYSIS: ICD-10-PCS | Mod: ,,, | Performed by: INTERNAL MEDICINE

## 2023-04-05 PROCEDURE — 20600001 HC STEP DOWN PRIVATE ROOM

## 2023-04-05 PROCEDURE — 80048 BASIC METABOLIC PNL TOTAL CA: CPT | Performed by: STUDENT IN AN ORGANIZED HEALTH CARE EDUCATION/TRAINING PROGRAM

## 2023-04-05 PROCEDURE — 63600175 PHARM REV CODE 636 W HCPCS: Performed by: STUDENT IN AN ORGANIZED HEALTH CARE EDUCATION/TRAINING PROGRAM

## 2023-04-05 PROCEDURE — 25000003 PHARM REV CODE 250: Performed by: INTERNAL MEDICINE

## 2023-04-05 PROCEDURE — 83735 ASSAY OF MAGNESIUM: CPT | Performed by: STUDENT IN AN ORGANIZED HEALTH CARE EDUCATION/TRAINING PROGRAM

## 2023-04-05 PROCEDURE — 25000003 PHARM REV CODE 250: Performed by: STUDENT IN AN ORGANIZED HEALTH CARE EDUCATION/TRAINING PROGRAM

## 2023-04-05 PROCEDURE — 83615 LACTATE (LD) (LDH) ENZYME: CPT | Performed by: STUDENT IN AN ORGANIZED HEALTH CARE EDUCATION/TRAINING PROGRAM

## 2023-04-05 PROCEDURE — G0378 HOSPITAL OBSERVATION PER HR: HCPCS

## 2023-04-05 PROCEDURE — 85610 PROTHROMBIN TIME: CPT | Performed by: STUDENT IN AN ORGANIZED HEALTH CARE EDUCATION/TRAINING PROGRAM

## 2023-04-05 PROCEDURE — 85025 COMPLETE CBC W/AUTO DIFF WBC: CPT | Performed by: STUDENT IN AN ORGANIZED HEALTH CARE EDUCATION/TRAINING PROGRAM

## 2023-04-05 PROCEDURE — 99233 PR SUBSEQUENT HOSPITAL CARE,LEVL III: ICD-10-PCS | Mod: ,,, | Performed by: INTERNAL MEDICINE

## 2023-04-05 PROCEDURE — 27000685 HC LVAD KIT (30 DAY SUPPLY)

## 2023-04-05 PROCEDURE — 93750 INTERROGATION VAD IN PERSON: CPT | Mod: ,,, | Performed by: INTERNAL MEDICINE

## 2023-04-05 RX ORDER — FUROSEMIDE 10 MG/ML
40 INJECTION INTRAMUSCULAR; INTRAVENOUS 2 TIMES DAILY
Status: DISCONTINUED | OUTPATIENT
Start: 2023-04-05 | End: 2023-04-06

## 2023-04-05 RX ADMIN — INSULIN ASPART 10 UNITS: 100 INJECTION, SOLUTION INTRAVENOUS; SUBCUTANEOUS at 10:04

## 2023-04-05 RX ADMIN — INSULIN ASPART 10 UNITS: 100 INJECTION, SOLUTION INTRAVENOUS; SUBCUTANEOUS at 05:04

## 2023-04-05 RX ADMIN — CYCLOBENZAPRINE HYDROCHLORIDE 5 MG: 5 TABLET, FILM COATED ORAL at 08:04

## 2023-04-05 RX ADMIN — WARFARIN SODIUM 6 MG: 3 TABLET ORAL at 05:04

## 2023-04-05 RX ADMIN — GABAPENTIN 100 MG: 100 CAPSULE ORAL at 09:04

## 2023-04-05 RX ADMIN — Medication 100 MG: at 09:04

## 2023-04-05 RX ADMIN — SPIRONOLACTONE 25 MG: 25 TABLET, FILM COATED ORAL at 09:04

## 2023-04-05 RX ADMIN — GABAPENTIN 100 MG: 100 CAPSULE ORAL at 08:04

## 2023-04-05 RX ADMIN — FUROSEMIDE 40 MG: 10 INJECTION, SOLUTION INTRAMUSCULAR; INTRAVENOUS at 05:04

## 2023-04-05 RX ADMIN — INSULIN DETEMIR 15 UNITS: 100 INJECTION, SOLUTION SUBCUTANEOUS at 08:04

## 2023-04-05 RX ADMIN — MILRINONE LACTATE IN DEXTROSE 0.25 MCG/KG/MIN: 200 INJECTION, SOLUTION INTRAVENOUS at 04:04

## 2023-04-05 RX ADMIN — FUROSEMIDE 40 MG: 10 INJECTION, SOLUTION INTRAMUSCULAR; INTRAVENOUS at 09:04

## 2023-04-05 RX ADMIN — CYCLOBENZAPRINE HYDROCHLORIDE 5 MG: 5 TABLET, FILM COATED ORAL at 12:04

## 2023-04-05 RX ADMIN — OXYCODONE HYDROCHLORIDE AND ACETAMINOPHEN 1 TABLET: 5; 325 TABLET ORAL at 10:04

## 2023-04-05 RX ADMIN — FAMOTIDINE 40 MG: 20 TABLET ORAL at 09:04

## 2023-04-05 RX ADMIN — INSULIN DETEMIR 15 UNITS: 100 INJECTION, SOLUTION SUBCUTANEOUS at 10:04

## 2023-04-05 RX ADMIN — LEVETIRACETAM 1000 MG: 500 TABLET, FILM COATED ORAL at 08:04

## 2023-04-05 RX ADMIN — LEVETIRACETAM 1000 MG: 500 TABLET, FILM COATED ORAL at 09:04

## 2023-04-05 RX ADMIN — MUPIROCIN: 20 OINTMENT TOPICAL at 10:04

## 2023-04-05 RX ADMIN — CYCLOBENZAPRINE HYDROCHLORIDE 5 MG: 5 TABLET, FILM COATED ORAL at 05:04

## 2023-04-05 RX ADMIN — INSULIN ASPART 1 UNITS: 100 INJECTION, SOLUTION INTRAVENOUS; SUBCUTANEOUS at 08:04

## 2023-04-05 NOTE — NURSING
"RN entered pt room d/t infusion pump alarming, pt found to be disconnected from milrinone Pt stated that he "just needed to wash up really quick" and that is why he disconnected it. RN provided education to both patient and spouse that the pump is only to be adjusted by primary medical team and that pausing a critical drip can have serious implications. RN will continue to monitor.    0020- Pt milrinone gtt resumed  "

## 2023-04-05 NOTE — NURSING
Pt gone for walk off of unit with spouse. Pt has emergency bag with him. No visible distress noted.

## 2023-04-05 NOTE — PROGRESS NOTES
04/05/2023  Miracle Caballero    Current provider:  Marlo Marques MD    Device interrogation:  TXP LVAD INTERROGATIONS 4/5/2023 4/5/2023 4/5/2023 4/4/2023 4/4/2023 4/4/2023 4/4/2023   Type HeartMate3 HeartMate3 HeartMate3 HeartMate3 HeartMate3 HeartMate3 HeartMate3   Flow 3.6 3.9 4.3 4.0 3.9 3.1 4.0   Speed 5100 5100 5100 5150 5150 5100 5100   PI 7.2 6.1 4.7 6.3 6.7 98.8 5.3   Power (Serna) 3.6 3.7 3.7 3.7 3.8 3.7 3.6   LSL - 4700 4700 4700 4700 4700 -   Low Flow Alarm - - - - - - -   High Power Alarm - - - - - - -   Pulsatility - Intermittent pulse Intermittent pulse Intermittent pulse Intermittent pulse Intermittent pulse -          Rounded on Kevan Queen to ensure all mechanical assist device settings (IABP or VAD) were appropriate and all parameters were within limits.  I was able to ensure all back up equipment was present, the staff had no issues, and the Perfusion Department daily rounding was complete.      For implantable VADs: Interrogation of Ventricular assist device was performed with analysis of device parameters and review of device function. I have personally reviewed the interrogation findings and agree with findings as stated.     In emergency, the nursing units have been notified to contact the perfusion department either by:  Calling [phone] from 630am to 4pm Mon thru Fri, utilizing the On-Call Finder functionality of Epic and searching for Perfusion, or by contacting the hospital  from 4pm to 630am and on weekends and asking to speak with the perfusionist on call.    11:26 AM

## 2023-04-05 NOTE — SUBJECTIVE & OBJECTIVE
Interval History: NAEON. No complaints. Marginally on IVP lasix (40 mg x1). CVP 15. Net -ve -900cc /24 hours, will increase to BID IVP to hopefully get better effect.       Continuous Infusions:   milrinone 20mg/100ml D5W (200mcg/ml) 0.25 mcg/kg/min (04/05/23 2925)     Scheduled Meds:   famotidine  40 mg Oral Daily    furosemide (LASIX) injection  40 mg Intravenous BID    gabapentin  100 mg Oral TID    insulin aspart U-100  10 Units Subcutaneous TIDWM    insulin detemir U-100  15 Units Subcutaneous BID    INV ASPIRIN 100MG/PLACEBO  100 mg Oral Daily    levETIRAcetam  1,000 mg Oral BID    mupirocin   Nasal BID    spironolactone  25 mg Oral Daily    warfarin  6 mg Oral Every Mon, Wed, Fri    warfarin  4.5 mg Oral Every Tues, Thurs, Sat, Sun     PRN Meds:cyclobenzaprine, dextrose 10%, dextrose 10%, dextrose, dextrose, glucagon (human recombinant), hydrALAZINE, insulin aspart U-100, oxyCODONE-acetaminophen    Review of patient's allergies indicates:   Allergen Reactions    Aspirin Other (See Comments)     Mr. Thacker is enrolled in Dr. Paige's Alon Trial and cannot have any aspirin and/or aspirin-containing products. DO NOT cancel any orders for INV Aspirin 100 mg/Placebo. If you have questions, please contact Isabel @ [phone], [phone],[email or web address], secure chat or MS Teams message.    Bumex [bumetanide] Hives    Lactose Diarrhea     Other reaction(s): Abdominal distension, gaseous    Torsemide Hives     Objective:     Vital Signs (Most Recent):  Temp: 97.8 °F (36.6 °C) (04/05/23 0900)  Pulse: 96 (04/05/23 1047)  Resp: 18 (04/05/23 0900)  BP: (!) 78/0 (04/05/23 0900)  SpO2: 98 % (04/05/23 0900)   Vital Signs (24h Range):  Temp:  [97.4 °F (36.3 °C)-98.1 °F (36.7 °C)] 97.8 °F (36.6 °C)  Pulse:  [] 96  Resp:  [14-18] 18  SpO2:  [97 %-100 %] 98 %  BP: (78-98)/(0) 78/0     Patient Vitals for the past 72 hrs (Last 3 readings):   Weight   04/04/23 1411 106.1 kg (234 lb)   04/04/23 0800 106.5 kg (234 lb  12.6 oz)   04/03/23 0700 105.6 kg (232 lb 12.9 oz)       Body mass index is 29.25 kg/m².      Intake/Output Summary (Last 24 hours) at 4/5/2023 1140  Last data filed at 4/5/2023 0446  Gross per 24 hour   Intake 1084 ml   Output 1020 ml   Net 64 ml         Hemodynamic Parameters:  CVP:  [15 mmHg] 15 mmHg      Physical Exam  Vitals and nursing note reviewed.   HENT:      Head: Normocephalic and atraumatic.      Nose: Nose normal.   Eyes:      Conjunctiva/sclera: Conjunctivae normal.   Neck:      Vascular: JVD present.   Cardiovascular:      Rate and Rhythm: Normal rate and regular rhythm.      Comments: Smooth VAD hum   Pulmonary:      Effort: Pulmonary effort is normal.   Abdominal:      General: Abdomen is flat. There is no distension.   Musculoskeletal:         General: Normal range of motion.      Cervical back: Normal range of motion.   Skin:     General: Skin is warm.   Neurological:      General: No focal deficit present.      Mental Status: He is alert.   Psychiatric:         Mood and Affect: Mood normal.         Behavior: Behavior normal.         Thought Content: Thought content normal.         Judgment: Judgment normal.       Significant Labs:  CBC:  Recent Labs   Lab 04/03/23  0444 04/04/23  0450 04/05/23  0516   WBC 9.15 9.57 11.79   RBC 4.08* 4.23* 4.25*   HGB 10.6* 11.0* 11.0*   HCT 33.6* 35.3* 34.8*    289 292   MCV 82 84 82   MCH 26.0* 26.0* 25.9*   MCHC 31.5* 31.2* 31.6*       BNP:  Recent Labs   Lab 04/02/23  0515   .4*       CMP:  Recent Labs   Lab 04/02/23  1151 04/02/23  1614 04/03/23  0444 04/04/23  0449 04/05/23  0516   *   < > 195* 167* 174*   CALCIUM 8.9   < > 8.9 9.2 8.7   ALBUMIN 3.7  --  3.7  --  3.5   PROT 7.5  --  7.5  --  7.2      < > 137 138 134*   K 3.4*   < > 3.6 3.9 3.8   CO2 24   < > 23 25 24      < > 103 104 102   BUN 10   < > 13 12 13   CREATININE 1.3   < > 1.4 1.4 1.3   ALKPHOS 101  --  107  --  104   ALT 15  --  16  --  16   AST 22  --  25  --   26   BILITOT 1.2*  --  0.9  --  0.8    < > = values in this interval not displayed.        Coagulation:   Recent Labs   Lab 04/03/23  0444 04/04/23  0450 04/05/23  0516   INR 1.6* 1.7* 1.8*   APTT 29.4 29.8 32.4*       LDH:  Recent Labs   Lab 04/02/23  1151 04/03/23  0444 04/04/23  0449 04/05/23  0516   * 281* 259 311*       Microbiology:  Microbiology Results (last 7 days)       ** No results found for the last 168 hours. **            I have reviewed all pertinent labs within the past 24 hours.    Estimated Creatinine Clearance: 101.5 mL/min (based on SCr of 1.3 mg/dL).    Diagnostic Results:  I have reviewed all pertinent imaging results/findings within the past 24 hours.

## 2023-04-05 NOTE — PROGRESS NOTES
[facility] - Cardiology Stepdown  Heart Transplant  Progress Note    Patient Name: Kevan Queen  MRN: [id number]  Admission Date: 4/2/2023  Hospital Length of Stay: 3 days  Attending Physician: Marlo Marques MD  Primary Care Provider: ORALIA Cline  Principal Problem:Acute on chronic combined systolic and diastolic heart failure    Subjective:     Interval History: NAEON. No complaints. Marginally on IVP lasix (40 mg x1). CVP 15. Net -ve -900cc /24 hours, will increase to BID IVP to hopefully get better effect. Patient remains hypervolemic.      Continuous Infusions:   milrinone 20mg/100ml D5W (200mcg/ml) 0.25 mcg/kg/min (04/05/23 8298)     Scheduled Meds:   famotidine  40 mg Oral Daily    furosemide (LASIX) injection  40 mg Intravenous BID    gabapentin  100 mg Oral TID    insulin aspart U-100  10 Units Subcutaneous TIDWM    insulin detemir U-100  15 Units Subcutaneous BID    INV ASPIRIN 100MG/PLACEBO  100 mg Oral Daily    levETIRAcetam  1,000 mg Oral BID    mupirocin   Nasal BID    spironolactone  25 mg Oral Daily    warfarin  6 mg Oral Every Mon, Wed, Fri    warfarin  4.5 mg Oral Every Tues, Thurs, Sat, Sun     PRN Meds:cyclobenzaprine, dextrose 10%, dextrose 10%, dextrose, dextrose, glucagon (human recombinant), hydrALAZINE, insulin aspart U-100, oxyCODONE-acetaminophen    Review of patient's allergies indicates:   Allergen Reactions    Aspirin Other (See Comments)     Mr. Thacker is enrolled in Dr. Paige's Alon Trial and cannot have any aspirin and/or aspirin-containing products. DO NOT cancel any orders for INV Aspirin 100 mg/Placebo. If you have questions, please contact Isabel @ [phone], [phone],[email or web address], secure chat or MS Teams message.    Bumex [bumetanide] Hives    Lactose Diarrhea     Other reaction(s): Abdominal distension, gaseous    Torsemide Hives     Objective:     Vital Signs (Most Recent):  Temp: 97.8 °F (36.6 °C) (04/05/23 0900)  Pulse: 96 (04/05/23 1047)  Resp: 18  (04/05/23 0900)  BP: (!) 78/0 (04/05/23 0900)  SpO2: 98 % (04/05/23 0900)   Vital Signs (24h Range):  Temp:  [97.4 °F (36.3 °C)-98.1 °F (36.7 °C)] 97.8 °F (36.6 °C)  Pulse:  [] 96  Resp:  [14-18] 18  SpO2:  [97 %-100 %] 98 %  BP: (78-98)/(0) 78/0     Patient Vitals for the past 72 hrs (Last 3 readings):   Weight   04/04/23 1411 106.1 kg (234 lb)   04/04/23 0800 106.5 kg (234 lb 12.6 oz)   04/03/23 0700 105.6 kg (232 lb 12.9 oz)       Body mass index is 29.25 kg/m².      Intake/Output Summary (Last 24 hours) at 4/5/2023 1140  Last data filed at 4/5/2023 0446  Gross per 24 hour   Intake 1084 ml   Output 1020 ml   Net 64 ml         Hemodynamic Parameters:  CVP:  [15 mmHg] 15 mmHg      Physical Exam  Vitals and nursing note reviewed.   HENT:      Head: Normocephalic and atraumatic.      Nose: Nose normal.   Eyes:      Conjunctiva/sclera: Conjunctivae normal.   Neck:      Vascular: JVD present.   Cardiovascular:      Rate and Rhythm: Normal rate and regular rhythm.      Comments: Smooth VAD hum   Pulmonary:      Effort: Pulmonary effort is normal.   Abdominal:      General: Abdomen is flat. There is no distension.   Musculoskeletal:         General: Normal range of motion.      Cervical back: Normal range of motion.   Skin:     General: Skin is warm.   Neurological:      General: No focal deficit present.      Mental Status: He is alert.   Psychiatric:         Mood and Affect: Mood normal.         Behavior: Behavior normal.         Thought Content: Thought content normal.         Judgment: Judgment normal.       Significant Labs:  CBC:  Recent Labs   Lab 04/03/23  0444 04/04/23  0450 04/05/23  0516   WBC 9.15 9.57 11.79   RBC 4.08* 4.23* 4.25*   HGB 10.6* 11.0* 11.0*   HCT 33.6* 35.3* 34.8*    289 292   MCV 82 84 82   MCH 26.0* 26.0* 25.9*   MCHC 31.5* 31.2* 31.6*       BNP:  Recent Labs   Lab 04/02/23  0515   .4*       CMP:  Recent Labs   Lab 04/02/23  1151 04/02/23  1614 04/03/23  0444  04/04/23  0449 04/05/23  0516   *   < > 195* 167* 174*   CALCIUM 8.9   < > 8.9 9.2 8.7   ALBUMIN 3.7  --  3.7  --  3.5   PROT 7.5  --  7.5  --  7.2      < > 137 138 134*   K 3.4*   < > 3.6 3.9 3.8   CO2 24   < > 23 25 24      < > 103 104 102   BUN 10   < > 13 12 13   CREATININE 1.3   < > 1.4 1.4 1.3   ALKPHOS 101  --  107  --  104   ALT 15  --  16  --  16   AST 22  --  25  --  26   BILITOT 1.2*  --  0.9  --  0.8    < > = values in this interval not displayed.        Coagulation:   Recent Labs   Lab 04/03/23  0444 04/04/23  0450 04/05/23  0516   INR 1.6* 1.7* 1.8*   APTT 29.4 29.8 32.4*       LDH:  Recent Labs   Lab 04/02/23  1151 04/03/23  0444 04/04/23  0449 04/05/23  0516   * 281* 259 311*       Microbiology:  Microbiology Results (last 7 days)       ** No results found for the last 168 hours. **            I have reviewed all pertinent labs within the past 24 hours.    Estimated Creatinine Clearance: 101.5 mL/min (based on SCr of 1.3 mg/dL).    Diagnostic Results:  I have reviewed all pertinent imaging results/findings within the past 24 hours.    Assessment and Plan:     36 yo black male with stage d HFrEF, NICM, ? 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 He underwent RVAD removal and chest closure 6/30/2022.  He also completed a course of IV Abx for staph epi. He was weaned off  but he had to restarted due to RVF. He was eventually transitioned to milrinone (secondary to  shortage) now on 0.25 mcg/kg/min. He also has a history of unclear syncope vs seizures and is followed by neuro for this and is on Keppra. Patient presents today as a transfer from [place] after he presented there after he called about running out of his milrinone. Wife was at bedside that states, she has  been in contact with everyone but a few weeks ago she had changed her phone number and lost contact at one point with someone in infusion. Patient overall denies any complaints, celebrated his birthday yesterday. He was lat seen in clinic with Dr. Villatoro on 3/23. Reports compliance with all his medication. He does however, report that he takes his lasix but not 80 mg, daily he started taking 40 mg, daily and at times he wouldn't because he feels dry. Last took his Lasix ?Friday. Patient denies any fever, chills, nausea, vomiting, hematemesis, cough, chest pain, palpitations, shortness of breath, abdominal pain/distension, changes in bowel or urinary habits, no hematochezia or melena.      * Acute on chronic combined systolic and diastolic heart failure  - [facility] s/p HM3-DT (6/2022). Continues to be hypervolemic on exam   - Continue with Milrinone 0.25 and check hemoydnamics  - Repeat Echocardiogram pending   - Home Diuresis: Lasix 40 mg, daily (per patient)  - Patient appears hypervolemic on exam with elevated JVP; CVP 18 on admit and was given 80 mg IVP Lasix x 1.   - CVP 15 this AM. Increase to 40 mg IVP BID  - Monitor electrolytes closely. Maintain Mg >2 and K >4   - HF Pathway: Sodium restriction <2 g, Fluid restriction < 1500 mL, Strict I/Os, Daily weights    Temporal lobe seizure  - Resume Keppra  - Seizure precautions    LVAD (left ventricular assist device) present  -HeartMate 3 Implanted on 6/29/2022 as DT  -Continue Coumadin, Goal INR 2.0-3.0. Subherapeutic today: 1.7.   -Antiplatelets: INV Aspirin (Alon Trial)  -LDH is stable overall today.  Will continue to monitor daily.  -Speed set at 5100, LSL 4700 rpm  -ECHO pending. Last ECHO 9/1/22 with IV midline, AV does not open, Moderate-severe reduced RVSF, PASP 22, CVP 3, LVEDD 5.97 with LVAD speed 5100. IV milrinone increased from 0 .125 to 0.25 after RHC on 10/31/22    Procedure: Device Interrogation Including analysis of device parameters  Current Settings:  Ventricular Assist Device  Review of device function is stable/unstable stable    TXP LVAD INTERROGATIONS 4/5/2023 4/5/2023 4/5/2023 4/4/2023 4/4/2023 4/4/2023 4/4/2023   Type HeartMate3 HeartMate3 HeartMate3 HeartMate3 HeartMate3 HeartMate3 HeartMate3   Flow 3.6 3.9 4.3 4.0 3.9 3.1 4.0   Speed 5100 5100 5100 5150 5150 5100 5100   PI 7.2 6.1 4.7 6.3 6.7 98.8 5.3   Power (Serna) 3.6 3.7 3.7 3.7 3.8 3.7 3.6   LSL - 4700 4700 4700 4700 4700 -   Low Flow Alarm - - - - - - -   High Power Alarm - - - - - - -   Pulsatility - Intermittent pulse Intermittent pulse Intermittent pulse Intermittent pulse Intermittent pulse -       Type 2 diabetes mellitus with hyperglycemia  - Last A1c: 9.2, Repeat A1c  - Home Regimen: Detemir 22 units BID; Aspart 14 TID  - Will start Levemir 15 units, BID and Aspart 5 TID and up-titrate while inpatient  - Endocrinology Consult        Jeff Spencer PA-C  Heart Transplant  [facility] - Cardiology Stepdown

## 2023-04-05 NOTE — PLAN OF CARE
Problem: Adult Inpatient Plan of Care  Goal: Plan of Care Review  4/4/2023 1928 by Lorri Riggs RN  Outcome: Ongoing, Progressing  4/4/2023 1546 by Lorri Riggs RN  Outcome: Ongoing, Progressing  Goal: Patient-Specific Goal (Individualized)  Outcome: Ongoing, Progressing  Goal: Absence of Hospital-Acquired Illness or Injury  Outcome: Ongoing, Progressing     Problem: Diabetes Comorbidity  Goal: Blood Glucose Level Within Targeted Range  Outcome: Ongoing, Progressing     Problem: Infection  Goal: Absence of Infection Signs and Symptoms  Outcome: Ongoing, Progressing     Problem: Fall Injury Risk  Goal: Absence of Fall and Fall-Related Injury  Outcome: Ongoing, Progressing

## 2023-04-05 NOTE — ASSESSMENT & PLAN NOTE
-HeartMate 3 Implanted on 6/29/2022 as DT  -Continue Coumadin, Goal INR 2.0-3.0. Subherapeutic today: 1.7.   -Antiplatelets: INV Aspirin (Alon Trial)  -LDH is stable overall today.  Will continue to monitor daily.  -Speed set at 5100, LSL 4700 rpm  -ECHO pending. Last ECHO 9/1/22 with IV midline, AV does not open, Moderate-severe reduced RVSF, PASP 22, CVP 3, LVEDD 5.97 with LVAD speed 5100. IV milrinone increased from 0 .125 to 0.25 after RHC on 10/31/22    Procedure: Device Interrogation Including analysis of device parameters  Current Settings: Ventricular Assist Device  Review of device function is stable/unstable stable    TXP LVAD INTERROGATIONS 4/5/2023 4/5/2023 4/5/2023 4/4/2023 4/4/2023 4/4/2023 4/4/2023   Type HeartMate3 HeartMate3 HeartMate3 HeartMate3 HeartMate3 HeartMate3 HeartMate3   Flow 3.6 3.9 4.3 4.0 3.9 3.1 4.0   Speed 5100 5100 5100 5150 5150 5100 5100   PI 7.2 6.1 4.7 6.3 6.7 98.8 5.3   Power (Serna) 3.6 3.7 3.7 3.7 3.8 3.7 3.6   LSL - 4700 4700 4700 4700 4700 -   Low Flow Alarm - - - - - - -   High Power Alarm - - - - - - -   Pulsatility - Intermittent pulse Intermittent pulse Intermittent pulse Intermittent pulse Intermittent pulse -

## 2023-04-05 NOTE — PROGRESS NOTES
[facility]  Cardiothoracic Surgery  Evaluation and Management/VAD interrogation       Patient Name: Kevan Queen  MRN: [id number]  Admission Date: 4/2/2023  Hospital Length of Stay: 3 days  Code Status: Full Code   Attending Physician: Marlo Marques MD   Referring Provider: Elba Dominguez MD  Principal Problem:Acute on chronic combined systolic and diastolic heart failure            Subjective:     Post-Op Info:  * No surgery found *         Subjective:     Post-Op Info:  * No surgery found *            Interval History: continue diuretic therapy. Echo 4/4/23 showed EF 15%, IVS bow to the RV, mod MR, CVp 15, PAP 52  Implant 6/29/22    Medications:  Continuous Infusions:   milrinone 20mg/100ml D5W (200mcg/ml) 0.25 mcg/kg/min (04/05/23 6495)     Scheduled Meds:   famotidine  40 mg Oral Daily    furosemide (LASIX) injection  40 mg Intravenous BID    gabapentin  100 mg Oral TID    insulin aspart U-100  10 Units Subcutaneous TIDWM    insulin detemir U-100  15 Units Subcutaneous BID    INV ASPIRIN 100MG/PLACEBO  100 mg Oral Daily    levETIRAcetam  1,000 mg Oral BID    mupirocin   Nasal BID    spironolactone  25 mg Oral Daily    warfarin  6 mg Oral Every Mon, Wed, Fri    warfarin  4.5 mg Oral Every Tues, Thurs, Sat, Sun     PRN Meds:cyclobenzaprine, dextrose 10%, dextrose 10%, dextrose, dextrose, glucagon (human recombinant), hydrALAZINE, insulin aspart U-100, oxyCODONE-acetaminophen     Objective:     Vital Signs (Most Recent):  Temp: 97.8 °F (36.6 °C) (04/05/23 0900)  Pulse: 96 (04/05/23 1047)  Resp: 18 (04/05/23 0900)  BP: (!) 78/0 (04/05/23 0900)  SpO2: 98 % (04/05/23 0900)   Vital Signs (24h Range):  Temp:  [97.4 °F (36.3 °C)-98.1 °F (36.7 °C)] 97.8 °F (36.6 °C)  Pulse:  [] 96  Resp:  [16-18] 18  SpO2:  [97 %-100 %] 98 %  BP: (78-98)/(0) 78/0       Intake/Output Summary (Last 24 hours) at 4/5/2023 1302  Last data filed at 4/5/2023 0446  Gross per 24 hour   Intake 862 ml   Output  600 ml   Net 262 ml       Physical Exam  Constitutional:       Appearance: Normal appearance.   HENT:      Head: Normocephalic.   Eyes:      Pupils: Pupils are equal, round, and reactive to light.   Cardiovascular:      Rate and Rhythm: Normal rate.      Comments: LVAD hum is smooth  Pulmonary:      Effort: Pulmonary effort is normal.   Abdominal:      General: Abdomen is flat.   Skin:     General: Skin is warm and dry.      Coloration: Skin is not pale.   Neurological:      General: No focal deficit present.      Mental Status: He is alert.       Significant Labs:  ABGs:   Recent Labs   Lab 04/03/23  2100   PH 7.333*   PCO2 53.2*   PO2 24*   HCO3 28.2*   POCSATURATED 38*   BE 2     Amylase: No results for input(s): AMYLASE in the last 48 hours.  BMP:   Recent Labs   Lab 04/05/23  0516   *   *   K 3.8      CO2 24   BUN 13   CREATININE 1.3   CALCIUM 8.7   MG 1.7     Cardiac markers: No results for input(s): CKMB, CPKMB, TROPONINT, TROPONINI, MYOGLOBIN in the last 48 hours.  CBC:   Recent Labs   Lab 04/05/23  0516   WBC 11.79   RBC 4.25*   HGB 11.0*   HCT 34.8*      MCV 82   MCH 25.9*   MCHC 31.6*     CMP:   Recent Labs   Lab 04/05/23  0516   *   CALCIUM 8.7   ALBUMIN 3.5   PROT 7.2   *   K 3.8   CO2 24      BUN 13   CREATININE 1.3   ALKPHOS 104   ALT 16   AST 26   BILITOT 0.8     Coagulation:   Recent Labs   Lab 04/05/23  0516   INR 1.8*   APTT 32.4*     Lactic Acid: No results for input(s): LACTATE in the last 48 hours.  LFTs:   Recent Labs   Lab 04/05/23  0516   ALT 16   AST 26   ALKPHOS 104   BILITOT 0.8   PROT 7.2   ALBUMIN 3.5     Lipase: No results for input(s): LIPASE in the last 48 hours.    Significant Diagnostics: reviewed   I have reviewed all pertinent imaging results/findings within the past 24 hours.  Echo 4/4/23   The left ventricle is severely enlarged with severely decreased systolic function.   The estimated ejection fraction is 15%.   Grade I left  ventricular diastolic dysfunction.   There is severe left ventricular global hypokinesis.   LVEDd 7.2cm   There is an LVAD present. Base speed is 5100 RPMs. The pump type is a Heartmate III. The interventricular septum appears to bow into the right ventricle. The aortic valve does not open.   Moderate mitral regurgitation.   Mild aortic regurgitation.   Moderate right ventricular enlargement with moderately reduced right ventricular systolic function.   Mild pulmonic regurgitation.   Elyse stitch   Elevated central venous pressure (15 mmHg).   The estimated PA systolic pressure is 32 mmHg.       Procedure: Device Interrogation Including analysis of device parameters  Current Settings: Ventricular Assist Device  Review of device function is stable  TXP LVAD INTERROGATIONS 4/5/2023 4/5/2023 4/5/2023 4/5/2023 4/4/2023 4/4/2023 4/4/2023   Type HeartMate3 HeartMate3 HeartMate3 HeartMate3 HeartMate3 HeartMate3 HeartMate3   Flow 4.10 3.6 3.9 4.3 4.0 3.9 3.1   Speed 5100 5100 5100 5100 5150 5150 5100   PI 6.3 7.2 6.1 4.7 6.3 6.7 98.8   Power (Serna) 3.7 3.6 3.7 3.7 3.7 3.8 3.7   LSL 4700 - 4700 4700 4700 4700 4700   Low Flow Alarm - - - - - - -   High Power Alarm - - - - - - -   Pulsatility - - Intermittent pulse Intermittent pulse Intermittent pulse Intermittent pulse Intermittent pulse         Assessment/Plan:     LVAD (left ventricular assist device) present  - Interval History was not obtained attending team member  during rounding today.  - VAD/ANABELLE teaching was not performed with patient.  - Mobilization/Physical Therapy is ongoing.  - Anticoagulation ongoing  - patient admitted due to primacor infusion ran out.   - WBC increase to 11 from 9  - H/H 11/34  - INR 1.8  - BUN/Cr 13/1.3  -   - net neg 896   - gtt: milirone 0.25  - echo showed IVS bow to the RV, mod MR, CVP 15 PAP 32  - lasix increase to 40 IV BID       Of which more than 50 percent of the care dominated counseling and coordinating care  with different team members. The VAD was interrogated and all parameters were WNL and no significant findings were found in the history. All these findings are documented in the note above.        Fay Lay PA-C  Cardiothoracic Surgery  [facility]

## 2023-04-05 NOTE — CARE UPDATE
-Glucose Goal 140-180    -A1C:   Hemoglobin A1C   Date Value Ref Range Status   04/02/2023 7.9 (H) 4.0 - 5.6 % Final     Comment:     ADA Screening Guidelines:  5.7-6.4%  Consistent with prediabetes  >or=6.5%  Consistent with diabetes    High levels of fetal hemoglobin interfere with the HbA1C  assay. Heterozygous hemoglobin variants (HbS, HgC, etc)do  not significantly interfere with this assay.   However, presence of multiple variants may affect accuracy.           -HOME REGIMEN: -Levemir 22 units BID.   -Novolog 16 units TIDWM in addition to the following correction scale:     150 - 200 + 1 unit     201 - 250 + 2 units     251 - 300 + 3 units     301 - 350 + 4 units      > 350   + 5 units    -GLUCOSE TREND FOR THE PAST 24HRS:   Recent Labs   Lab 04/03/23  1645 04/03/23  2041 04/04/23  0843 04/04/23  1157 04/04/23  1739 04/04/23  2145   POCTGLUCOSE 108 136* 182* 105 170* 106           -NO HYPOGYCEMIAS NOTED     - Diet  Diet Cardiac    T2DM on insulin at home.  Remains in 302/302 A. Admitted for HF.  BG currently stable on regimen; no changes to current regimen.       Plan:   - Continue Levemir (Insulin Detemir) 15 units BID  - Continue Novolog (Insulin Aspart) 10 units TIDWM   - Continue Novolog [facility] (150/25)  - BG checks /HS/0200  - Hypoglycemia protocol in place      ** Please notify Endocrine for any change and/or advance in diet**  ** Please call Endocrine for any BG related issues **     Discharge Planning:   TBD. Please notify endocrinology prior to discharge.

## 2023-04-05 NOTE — PLAN OF CARE
Pt educated on fall risk, pt remained free from falls/trauma/injury. Denies chest pain, SOB, palpitations or dizziness. Plan of care reviewed with pt. Bed locked in lowest position, call bell within reach, no acute distress noted, will continue to monitor. No low flow alarms, LVAD dressing changed by caregiver, monitoring elevated dopplers, hydralazine 50mg x1 administered. Doppler now WNL.      Problem: Adult Inpatient Plan of Care  Goal: Plan of Care Review  Outcome: Ongoing, Progressing  Goal: Patient-Specific Goal (Individualized)  Outcome: Ongoing, Progressing  Goal: Absence of Hospital-Acquired Illness or Injury  Outcome: Ongoing, Progressing  Goal: Optimal Comfort and Wellbeing  Outcome: Ongoing, Progressing  Goal: Readiness for Transition of Care  Outcome: Ongoing, Progressing     Problem: Diabetes Comorbidity  Goal: Blood Glucose Level Within Targeted Range  Outcome: Ongoing, Progressing     Problem: Infection  Goal: Absence of Infection Signs and Symptoms  Outcome: Ongoing, Progressing     Problem: Fall Injury Risk  Goal: Absence of Fall and Fall-Related Injury  Outcome: Ongoing, Progressing

## 2023-04-05 NOTE — ASSESSMENT & PLAN NOTE
- NICM s/p HM3-DT (6/2022)  - Continue with Milrinone 0.25 and check hemoydnamics  - Repeat Echocardiogram pending   - Home Diuresis: Lasix 40 mg, daily (per patient)  - Patient appears hypervolemic on exam with elevated JVP; CVP 18 on admit and was given 80 mg IVP Lasix x 1.   - CVP 15 this AM. Increase to 40 mg IVP BID  - Monitor electrolytes closely. Maintain Mg >2 and K >4   - HF Pathway: Sodium restriction <2 g, Fluid restriction < 1500 mL, Strict I/Os, Daily weights

## 2023-04-05 NOTE — ASSESSMENT & PLAN NOTE
- Interval History was not obtained attending team member  during rounding today.  - VAD/ANABELLE teaching was not performed with patient.  - Mobilization/Physical Therapy is ongoing.  - Anticoagulation ongoing  - patient admitted due to primacor infusion ran out.   - WBC increase to 11 from 9  - H/H 11/34  - INR 1.8  - BUN/Cr 13/1.3  -   - net neg 896   - gtt: milirone 0.25  - echo showed IVS bow to the RV, mod MR, CVP 15 PAP 32  - lasix increase to 40 IV BID       Of which more than 50 percent of the care dominated counseling and coordinating care with different team members. The VAD was interrogated and all parameters were WNL and no significant findings were found in the history. All these findings are documented in the note above.

## 2023-04-05 NOTE — SUBJECTIVE & OBJECTIVE
Subjective:     Post-Op Info:  * No surgery found *            Interval History: continue diuretic therapy. Echo 4/4/23 showed EF 15%, IVS bow to the RV, mod MR, CVp 15, PAP 52  Implant 6/29/22    Medications:  Continuous Infusions:   milrinone 20mg/100ml D5W (200mcg/ml) 0.25 mcg/kg/min (04/05/23 0455)     Scheduled Meds:   famotidine  40 mg Oral Daily    furosemide (LASIX) injection  40 mg Intravenous BID    gabapentin  100 mg Oral TID    insulin aspart U-100  10 Units Subcutaneous TIDWM    insulin detemir U-100  15 Units Subcutaneous BID    INV ASPIRIN 100MG/PLACEBO  100 mg Oral Daily    levETIRAcetam  1,000 mg Oral BID    mupirocin   Nasal BID    spironolactone  25 mg Oral Daily    warfarin  6 mg Oral Every Mon, Wed, Fri    warfarin  4.5 mg Oral Every Tues, Thurs, Sat, Sun     PRN Meds:cyclobenzaprine, dextrose 10%, dextrose 10%, dextrose, dextrose, glucagon (human recombinant), hydrALAZINE, insulin aspart U-100, oxyCODONE-acetaminophen     Objective:     Vital Signs (Most Recent):  Temp: 97.8 °F (36.6 °C) (04/05/23 0900)  Pulse: 96 (04/05/23 1047)  Resp: 18 (04/05/23 0900)  BP: (!) 78/0 (04/05/23 0900)  SpO2: 98 % (04/05/23 0900)   Vital Signs (24h Range):  Temp:  [97.4 °F (36.3 °C)-98.1 °F (36.7 °C)] 97.8 °F (36.6 °C)  Pulse:  [] 96  Resp:  [16-18] 18  SpO2:  [97 %-100 %] 98 %  BP: (78-98)/(0) 78/0       Intake/Output Summary (Last 24 hours) at 4/5/2023 1302  Last data filed at 4/5/2023 0446  Gross per 24 hour   Intake 862 ml   Output 600 ml   Net 262 ml       Physical Exam  Constitutional:       Appearance: Normal appearance.   HENT:      Head: Normocephalic.   Eyes:      Pupils: Pupils are equal, round, and reactive to light.   Cardiovascular:      Rate and Rhythm: Normal rate.      Comments: LVAD hum is smooth  Pulmonary:      Effort: Pulmonary effort is normal.   Abdominal:      General: Abdomen is flat.   Skin:     General: Skin is warm and dry.      Coloration: Skin is not pale.   Neurological:       General: No focal deficit present.      Mental Status: He is alert.       Significant Labs:  ABGs:   Recent Labs   Lab 04/03/23  2100   PH 7.333*   PCO2 53.2*   PO2 24*   HCO3 28.2*   POCSATURATED 38*   BE 2     Amylase: No results for input(s): AMYLASE in the last 48 hours.  BMP:   Recent Labs   Lab 04/05/23  0516   *   *   K 3.8      CO2 24   BUN 13   CREATININE 1.3   CALCIUM 8.7   MG 1.7     Cardiac markers: No results for input(s): CKMB, CPKMB, TROPONINT, TROPONINI, MYOGLOBIN in the last 48 hours.  CBC:   Recent Labs   Lab 04/05/23  0516   WBC 11.79   RBC 4.25*   HGB 11.0*   HCT 34.8*      MCV 82   MCH 25.9*   MCHC 31.6*     CMP:   Recent Labs   Lab 04/05/23  0516   *   CALCIUM 8.7   ALBUMIN 3.5   PROT 7.2   *   K 3.8   CO2 24      BUN 13   CREATININE 1.3   ALKPHOS 104   ALT 16   AST 26   BILITOT 0.8     Coagulation:   Recent Labs   Lab 04/05/23  0516   INR 1.8*   APTT 32.4*     Lactic Acid: No results for input(s): LACTATE in the last 48 hours.  LFTs:   Recent Labs   Lab 04/05/23  0516   ALT 16   AST 26   ALKPHOS 104   BILITOT 0.8   PROT 7.2   ALBUMIN 3.5     Lipase: No results for input(s): LIPASE in the last 48 hours.    Significant Diagnostics: reviewed   I have reviewed all pertinent imaging results/findings within the past 24 hours.  Echo 4/4/23  The left ventricle is severely enlarged with severely decreased systolic function.  The estimated ejection fraction is 15%.  Grade I left ventricular diastolic dysfunction.  There is severe left ventricular global hypokinesis.  LVEDd 7.2cm  There is an LVAD present. Base speed is 5100 RPMs. The pump type is a Heartmate III. The interventricular septum appears to bow into the right ventricle. The aortic valve does not open.  Moderate mitral regurgitation.  Mild aortic regurgitation.  Moderate right ventricular enlargement with moderately reduced right ventricular systolic function.  Mild pulmonic regurgitation.  Elyse  stitch  Elevated central venous pressure (15 mmHg).  The estimated PA systolic pressure is 32 mmHg.       Procedure: Device Interrogation Including analysis of device parameters  Current Settings: Ventricular Assist Device  Review of device function is stable  TXP LVAD INTERROGATIONS 4/5/2023 4/5/2023 4/5/2023 4/5/2023 4/4/2023 4/4/2023 4/4/2023   Type HeartMate3 HeartMate3 HeartMate3 HeartMate3 HeartMate3 HeartMate3 HeartMate3   Flow 4.10 3.6 3.9 4.3 4.0 3.9 3.1   Speed 5100 5100 5100 5100 5150 5150 5100   PI 6.3 7.2 6.1 4.7 6.3 6.7 98.8   Power (Serna) 3.7 3.6 3.7 3.7 3.7 3.8 3.7   LSL 4700 - 4700 4700 4700 4700 4700   Low Flow Alarm - - - - - - -   High Power Alarm - - - - - - -   Pulsatility - - Intermittent pulse Intermittent pulse Intermittent pulse Intermittent pulse Intermittent pulse

## 2023-04-06 VITALS
TEMPERATURE: 98 F | DIASTOLIC BLOOD PRESSURE: 72 MMHG | OXYGEN SATURATION: 99 % | BODY MASS INDEX: 27.93 KG/M2 | SYSTOLIC BLOOD PRESSURE: 103 MMHG | HEIGHT: 75 IN | RESPIRATION RATE: 16 BRPM | WEIGHT: 224.63 LBS | HEART RATE: 110 BPM

## 2023-04-06 LAB
ALLENS TEST: ABNORMAL
ANION GAP SERPL CALC-SCNC: 10 MMOL/L (ref 8–16)
APTT PPP: 32.2 SEC (ref 21–32)
BASOPHILS # BLD AUTO: 0.07 K/UL (ref 0–0.2)
BASOPHILS NFR BLD: 0.7 % (ref 0–1.9)
BUN SERPL-MCNC: 14 MG/DL (ref 6–20)
CALCIUM SERPL-MCNC: 9.6 MG/DL (ref 8.7–10.5)
CHLORIDE SERPL-SCNC: 100 MMOL/L (ref 95–110)
CO2 SERPL-SCNC: 26 MMOL/L (ref 23–29)
CREAT SERPL-MCNC: 1.4 MG/DL (ref 0.5–1.4)
DELSYS: ABNORMAL
DIFFERENTIAL METHOD: ABNORMAL
EOSINOPHIL # BLD AUTO: 0.2 K/UL (ref 0–0.5)
EOSINOPHIL NFR BLD: 1.7 % (ref 0–8)
ERYTHROCYTE [DISTWIDTH] IN BLOOD BY AUTOMATED COUNT: 16.5 % (ref 11.5–14.5)
EST. GFR  (NO RACE VARIABLE): >60 ML/MIN/1.73 M^2
FIO2: 21
GLUCOSE SERPL-MCNC: 153 MG/DL (ref 70–110)
HCO3 UR-SCNC: 28.8 MMOL/L (ref 24–28)
HCT VFR BLD AUTO: 40 % (ref 40–54)
HGB BLD-MCNC: 12.7 G/DL (ref 14–18)
IMM GRANULOCYTES # BLD AUTO: 0.03 K/UL (ref 0–0.04)
IMM GRANULOCYTES NFR BLD AUTO: 0.3 % (ref 0–0.5)
INR PPP: 2 (ref 0.8–1.2)
LDH SERPL L TO P-CCNC: 268 U/L (ref 110–260)
LYMPHOCYTES # BLD AUTO: 3 K/UL (ref 1–4.8)
LYMPHOCYTES NFR BLD: 30.1 % (ref 18–48)
MAGNESIUM SERPL-MCNC: 1.9 MG/DL (ref 1.6–2.6)
MCH RBC QN AUTO: 25.7 PG (ref 27–31)
MCHC RBC AUTO-ENTMCNC: 31.8 G/DL (ref 32–36)
MCV RBC AUTO: 81 FL (ref 82–98)
MODE: ABNORMAL
MONOCYTES # BLD AUTO: 0.8 K/UL (ref 0.3–1)
MONOCYTES NFR BLD: 8.3 % (ref 4–15)
NEUTROPHILS # BLD AUTO: 5.8 K/UL (ref 1.8–7.7)
NEUTROPHILS NFR BLD: 58.9 % (ref 38–73)
NRBC BLD-RTO: 0 /100 WBC
PCO2 BLDA: 49.7 MMHG (ref 35–45)
PH SMN: 7.37 [PH] (ref 7.35–7.45)
PLATELET # BLD AUTO: 350 K/UL (ref 150–450)
PMV BLD AUTO: 10 FL (ref 9.2–12.9)
PO2 BLDA: 32 MMHG (ref 40–60)
POC BE: 4 MMOL/L
POC SATURATED O2: 59 % (ref 95–100)
POC TCO2: 30 MMOL/L (ref 24–29)
POCT GLUCOSE: 148 MG/DL (ref 70–110)
POCT GLUCOSE: 151 MG/DL (ref 70–110)
POCT GLUCOSE: 93 MG/DL (ref 70–110)
POTASSIUM SERPL-SCNC: 3.7 MMOL/L (ref 3.5–5.1)
PROTHROMBIN TIME: 19.6 SEC (ref 9–12.5)
RBC # BLD AUTO: 4.94 M/UL (ref 4.6–6.2)
SAMPLE: ABNORMAL
SITE: ABNORMAL
SODIUM SERPL-SCNC: 136 MMOL/L (ref 136–145)
SP02: 97
WBC # BLD AUTO: 9.89 K/UL (ref 3.9–12.7)

## 2023-04-06 PROCEDURE — 20600001 HC STEP DOWN PRIVATE ROOM

## 2023-04-06 PROCEDURE — 80048 BASIC METABOLIC PNL TOTAL CA: CPT | Performed by: STUDENT IN AN ORGANIZED HEALTH CARE EDUCATION/TRAINING PROGRAM

## 2023-04-06 PROCEDURE — 25000003 PHARM REV CODE 250: Performed by: PHYSICIAN ASSISTANT

## 2023-04-06 PROCEDURE — 85610 PROTHROMBIN TIME: CPT | Performed by: STUDENT IN AN ORGANIZED HEALTH CARE EDUCATION/TRAINING PROGRAM

## 2023-04-06 PROCEDURE — 85025 COMPLETE CBC W/AUTO DIFF WBC: CPT | Performed by: STUDENT IN AN ORGANIZED HEALTH CARE EDUCATION/TRAINING PROGRAM

## 2023-04-06 PROCEDURE — 99233 PR SUBSEQUENT HOSPITAL CARE,LEVL III: ICD-10-PCS | Mod: ,,, | Performed by: PHYSICIAN ASSISTANT

## 2023-04-06 PROCEDURE — 85730 THROMBOPLASTIN TIME PARTIAL: CPT | Performed by: STUDENT IN AN ORGANIZED HEALTH CARE EDUCATION/TRAINING PROGRAM

## 2023-04-06 PROCEDURE — 93750 INTERROGATION VAD IN PERSON: CPT | Mod: ,,, | Performed by: INTERNAL MEDICINE

## 2023-04-06 PROCEDURE — 25000003 PHARM REV CODE 250: Performed by: INTERNAL MEDICINE

## 2023-04-06 PROCEDURE — 99233 SBSQ HOSP IP/OBS HIGH 50: CPT | Mod: ,,, | Performed by: PHYSICIAN ASSISTANT

## 2023-04-06 PROCEDURE — 25000003 PHARM REV CODE 250: Performed by: STUDENT IN AN ORGANIZED HEALTH CARE EDUCATION/TRAINING PROGRAM

## 2023-04-06 PROCEDURE — 82803 BLOOD GASES ANY COMBINATION: CPT

## 2023-04-06 PROCEDURE — 63600175 PHARM REV CODE 636 W HCPCS: Performed by: STUDENT IN AN ORGANIZED HEALTH CARE EDUCATION/TRAINING PROGRAM

## 2023-04-06 PROCEDURE — 27000248 HC VAD-ADDITIONAL DAY

## 2023-04-06 PROCEDURE — 83615 LACTATE (LD) (LDH) ENZYME: CPT | Performed by: STUDENT IN AN ORGANIZED HEALTH CARE EDUCATION/TRAINING PROGRAM

## 2023-04-06 PROCEDURE — 94761 N-INVAS EAR/PLS OXIMETRY MLT: CPT

## 2023-04-06 PROCEDURE — 27000685 HC LVAD KIT (30 DAY SUPPLY)

## 2023-04-06 PROCEDURE — 83735 ASSAY OF MAGNESIUM: CPT | Performed by: STUDENT IN AN ORGANIZED HEALTH CARE EDUCATION/TRAINING PROGRAM

## 2023-04-06 PROCEDURE — G0378 HOSPITAL OBSERVATION PER HR: HCPCS

## 2023-04-06 PROCEDURE — 93750 PR INTERROGATE VENT ASSIST DEV, IN PERSON, W PHYSICIAN ANALYSIS: ICD-10-PCS | Mod: ,,, | Performed by: INTERNAL MEDICINE

## 2023-04-06 PROCEDURE — 99900035 HC TECH TIME PER 15 MIN (STAT)

## 2023-04-06 PROCEDURE — 96372 THER/PROPH/DIAG INJ SC/IM: CPT | Performed by: STUDENT IN AN ORGANIZED HEALTH CARE EDUCATION/TRAINING PROGRAM

## 2023-04-06 RX ORDER — WARFARIN 3 MG/1
TABLET ORAL
Qty: 38 TABLET | Refills: 11 | Status: ON HOLD
Start: 2023-04-06 | End: 2023-06-06 | Stop reason: SDUPTHER

## 2023-04-06 RX ORDER — FUROSEMIDE 80 MG/1
40 TABLET ORAL DAILY
Qty: 30 TABLET | Refills: 11 | Status: CANCELLED | OUTPATIENT
Start: 2023-04-06

## 2023-04-06 RX ADMIN — INSULIN ASPART 10 UNITS: 100 INJECTION, SOLUTION INTRAVENOUS; SUBCUTANEOUS at 11:04

## 2023-04-06 RX ADMIN — Medication 100 MG: at 09:04

## 2023-04-06 RX ADMIN — LEVETIRACETAM 1000 MG: 500 TABLET, FILM COATED ORAL at 09:04

## 2023-04-06 RX ADMIN — CYCLOBENZAPRINE HYDROCHLORIDE 5 MG: 5 TABLET, FILM COATED ORAL at 09:04

## 2023-04-06 RX ADMIN — FAMOTIDINE 40 MG: 20 TABLET ORAL at 09:04

## 2023-04-06 RX ADMIN — SPIRONOLACTONE 25 MG: 25 TABLET, FILM COATED ORAL at 09:04

## 2023-04-06 RX ADMIN — MILRINONE LACTATE IN DEXTROSE 0.25 MCG/KG/MIN: 200 INJECTION, SOLUTION INTRAVENOUS at 06:04

## 2023-04-06 RX ADMIN — INSULIN DETEMIR 15 UNITS: 100 INJECTION, SOLUTION SUBCUTANEOUS at 09:04

## 2023-04-06 RX ADMIN — INSULIN ASPART 10 UNITS: 100 INJECTION, SOLUTION INTRAVENOUS; SUBCUTANEOUS at 09:04

## 2023-04-06 RX ADMIN — CYCLOBENZAPRINE HYDROCHLORIDE 5 MG: 5 TABLET, FILM COATED ORAL at 11:04

## 2023-04-06 RX ADMIN — GABAPENTIN 100 MG: 100 CAPSULE ORAL at 09:04

## 2023-04-06 RX ADMIN — MILRINONE LACTATE IN DEXTROSE 0.25 MCG/KG/MIN: 200 INJECTION, SOLUTION INTRAVENOUS at 05:04

## 2023-04-06 RX ADMIN — WARFARIN SODIUM 4.5 MG: 2.5 TABLET ORAL at 04:04

## 2023-04-06 RX ADMIN — OXYCODONE HYDROCHLORIDE AND ACETAMINOPHEN 1 TABLET: 5; 325 TABLET ORAL at 11:04

## 2023-04-06 RX ADMIN — CYCLOBENZAPRINE HYDROCHLORIDE 5 MG: 5 TABLET, FILM COATED ORAL at 04:04

## 2023-04-06 RX ADMIN — OXYCODONE HYDROCHLORIDE AND ACETAMINOPHEN 1 TABLET: 5; 325 TABLET ORAL at 05:04

## 2023-04-06 RX ADMIN — INSULIN ASPART 10 UNITS: 100 INJECTION, SOLUTION INTRAVENOUS; SUBCUTANEOUS at 05:04

## 2023-04-06 NOTE — ASSESSMENT & PLAN NOTE
- Interval History was not obtained attending team member  during rounding today.  - VAD/ANABELLE teaching was not performed with patient.  - Mobilization/Physical Therapy is ongoing.  - Anticoagulation ongoing  - patient admitted due to primacor infusion ran out.   - WBC 9  - H/H 12.7/40  - INR 2.0  - BUN/Cr 14/1.4  -   - net neg 930, UOP 1.3L  - gtt: milirone 0.25  - echo on 4/4 showed IVS bow to the RV, mod MR, CVP 15 PAP 32  - no acute changes   - lasix increase to 40 IV BID       Of which more than 50 percent of the care dominated counseling and coordinating care with different team members. The VAD was interrogated and all parameters were WNL and no significant findings were found in the history. All these findings are documented in the note above.

## 2023-04-06 NOTE — PROGRESS NOTES
04/06/2023  Miracle Caballero    Current provider:  Marlo Marques MD    Device interrogation:  TXP LVAD INTERROGATIONS 4/6/2023 4/6/2023 4/6/2023 4/6/2023 4/5/2023 4/5/2023 4/5/2023   Type HeartMate3 HeartMate3 HeartMate3 HeartMate3 HeartMate3 HeartMate3 HeartMate3   Flow 3.9 3.7 4.1 4.0 3.8 4.0 4.1   Speed 5100 5100 5100 5100 5100 5100 5100   PI 6.1 5.8 6.0 6.0 7.3 6.8 6.3   Power (Serna) 3.7 3.8 3.7 3.7 3.8 3.9 3.7   LSL 4700 4700 4700 4700 4700 4700 4700   Low Flow Alarm - - - - - - -   High Power Alarm - - - - - - -   Pulsatility - - Intermittent pulse Intermittent pulse Intermittent pulse - -          Rounded on Kevan Queen to ensure all mechanical assist device settings (IABP or VAD) were appropriate and all parameters were within limits.  I was able to ensure all back up equipment was present, the staff had no issues, and the Perfusion Department daily rounding was complete.      For implantable VADs: Interrogation of Ventricular assist device was performed with analysis of device parameters and review of device function. I have personally reviewed the interrogation findings and agree with findings as stated.     In emergency, the nursing units have been notified to contact the perfusion department either by:  Calling [phone] from 630am to 4pm Mon thru Fri, utilizing the On-Call Finder functionality of Epic and searching for Perfusion, or by contacting the hospital  from 4pm to 630am and on weekends and asking to speak with the perfusionist on call.    11:34 AM

## 2023-04-06 NOTE — ASSESSMENT & PLAN NOTE
- NICM s/p HM3-DT (6/2022)  - Continue with Milrinone 0.25 and check hemoydnamics  - Repeat Echocardiogram pending   - Home Diuresis: Lasix 40 mg, daily (per patient)  - Patient appears euvolemic on exam ; CVP 13  - CVP 13 this AM. Transition back to oral PO lasix 80 qd.   - Monitor electrolytes closely. Maintain Mg >2 and K >4   - HF Pathway: Sodium restriction <2 g, Fluid restriction < 1500 mL, Strict I/Os, Daily weights

## 2023-04-06 NOTE — PROGRESS NOTES
[facility] - Cardiology Stepdown  Heart Transplant  Progress Note    Patient Name: Kevan Queen  MRN: [id number]  Admission Date: 4/2/2023  Hospital Length of Stay: 4 days  Attending Physician: Marlo Marques MD  Primary Care Provider: ORALIA Cline  Principal Problem:Acute on chronic combined systolic and diastolic heart failure    Subjective:     Interval History: NAEON. No complaints. Discharging home today.      Continuous Infusions:   milrinone 20mg/100ml D5W (200mcg/ml) 0.25 mcg/kg/min (04/06/23 0543)     Scheduled Meds:   famotidine  40 mg Oral Daily    gabapentin  100 mg Oral TID    insulin aspart U-100  10 Units Subcutaneous TIDWM    insulin detemir U-100  15 Units Subcutaneous BID    INV ASPIRIN 100MG/PLACEBO  100 mg Oral Daily    levETIRAcetam  1,000 mg Oral BID    mupirocin   Nasal BID    spironolactone  25 mg Oral Daily    warfarin  6 mg Oral Every Mon, Wed, Fri    warfarin  4.5 mg Oral Every Tues, Thurs, Sat, Sun     PRN Meds:cyclobenzaprine, dextrose 10%, dextrose 10%, dextrose, dextrose, glucagon (human recombinant), hydrALAZINE, insulin aspart U-100, oxyCODONE-acetaminophen    Review of patient's allergies indicates:   Allergen Reactions    Aspirin Other (See Comments)     Mr. Thacker is enrolled in Dr. Paige's Alon Trial and cannot have any aspirin and/or aspirin-containing products. DO NOT cancel any orders for INV Aspirin 100 mg/Placebo. If you have questions, please contact Isabel @ [phone], [phone],[email or web address], secure chat or MS Teams message.    Bumex [bumetanide] Hives    Lactose Diarrhea     Other reaction(s): Abdominal distension, gaseous    Torsemide Hives     Objective:     Vital Signs (Most Recent):  Temp: 97.6 °F (36.4 °C) (04/06/23 0800)  Pulse: 104 (04/06/23 0800)  Resp: 16 (04/06/23 0800)  BP: (!) 82/0 (04/06/23 0800)  SpO2: 99 % (04/06/23 0800)   Vital Signs (24h Range):  Temp:  [97.6 °F (36.4 °C)-98 °F (36.7 °C)] 97.6 °F (36.4 °C)  Pulse:   [] 104  Resp:  [16-18] 16  SpO2:  [94 %-99 %] 99 %  BP: (82-86)/(0) 82/0     Patient Vitals for the past 72 hrs (Last 3 readings):   Weight   04/06/23 0800 101.9 kg (224 lb 10.4 oz)   04/05/23 1700 102.9 kg (226 lb 13.7 oz)   04/04/23 1411 106.1 kg (234 lb)       Body mass index is 28.08 kg/m².      Intake/Output Summary (Last 24 hours) at 4/6/2023 1118  Last data filed at 4/6/2023 0430  Gross per 24 hour   Intake 440 ml   Output 1370 ml   Net -930 ml         Hemodynamic Parameters:  CVP:  [13 mmHg] 13 mmHg      Physical Exam  Vitals and nursing note reviewed.   HENT:      Head: Normocephalic and atraumatic.      Nose: Nose normal.   Eyes:      Conjunctiva/sclera: Conjunctivae normal.   Neck:      Vascular: JVD present.   Cardiovascular:      Rate and Rhythm: Normal rate and regular rhythm.      Comments: Smooth VAD hum   Pulmonary:      Effort: Pulmonary effort is normal.   Abdominal:      General: Abdomen is flat. There is no distension.   Musculoskeletal:         General: Normal range of motion.      Cervical back: Normal range of motion.   Skin:     General: Skin is warm.   Neurological:      General: No focal deficit present.      Mental Status: He is alert.   Psychiatric:         Mood and Affect: Mood normal.         Behavior: Behavior normal.         Thought Content: Thought content normal.         Judgment: Judgment normal.       Significant Labs:  CBC:  Recent Labs   Lab 04/04/23  0450 04/05/23  0440 04/05/23  0516 04/06/23  0413   WBC 9.57  --  11.79 9.89   RBC 4.23*  --  4.25* 4.94   HGB 11.0*  --  11.0* 12.7*   HCT 35.3* 38 34.8* 40.0     --  292 350   MCV 84  --  82 81*   MCH 26.0*  --  25.9* 25.7*   MCHC 31.2*  --  31.6* 31.8*       BNP:  Recent Labs   Lab 04/02/23  0515   .4*       CMP:  Recent Labs   Lab 04/02/23  1151 04/02/23  1614 04/03/23  0444 04/04/23  0449 04/05/23  0516 04/06/23  0413   *   < > 195* 167* 174* 153*   CALCIUM 8.9   < > 8.9 9.2 8.7 9.6   ALBUMIN 3.7   --  3.7  --  3.5  --    PROT 7.5  --  7.5  --  7.2  --       < > 137 138 134* 136   K 3.4*   < > 3.6 3.9 3.8 3.7   CO2 24   < > 23 25 24 26      < > 103 104 102 100   BUN 10   < > 13 12 13 14   CREATININE 1.3   < > 1.4 1.4 1.3 1.4   ALKPHOS 101  --  107  --  104  --    ALT 15  --  16  --  16  --    AST 22  --  25  --  26  --    BILITOT 1.2*  --  0.9  --  0.8  --     < > = values in this interval not displayed.        Coagulation:   Recent Labs   Lab 04/04/23  0450 04/05/23  0516 04/06/23 0413   INR 1.7* 1.8* 2.0*   APTT 29.8 32.4* 32.2*       LDH:  Recent Labs   Lab 04/04/23  0449 04/05/23  0516 04/06/23 0413    311* 268*       Microbiology:  Microbiology Results (last 7 days)       ** No results found for the last 168 hours. **            I have reviewed all pertinent labs within the past 24 hours.    Estimated Creatinine Clearance: 92.6 mL/min (based on SCr of 1.4 mg/dL).    Diagnostic Results:  I have reviewed all pertinent imaging results/findings within the past 24 hours.    Assessment and Plan:     36 yo black male with stage d HFrEF, NICM, ? 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 He underwent RVAD removal and chest closure 6/30/2022.  He also completed a course of IV Abx for staph epi. He was weaned off  but he had to restarted due to RVF. He was eventually transitioned to milrinone (secondary to  shortage) now on 0.25 mcg/kg/min. He also has a history of unclear syncope vs seizures and is followed by neuro for this and is on Keppra. Patient presents today as a transfer from [facility] after he presented there after he called about running out of his milrinone. Wife was at bedside that states, she has been in contact with everyone but a few weeks ago she had changed her phone  number and lost contact at one point with someone in infusion. Patient overall denies any complaints, celebrated his birthday yesterday. He was lat seen in clinic with Dr. Villatoro on 3/23. Reports compliance with all his medication. He does however, report that he takes his lasix but not 80 mg, daily he started taking 40 mg, daily and at times he wouldn't because he feels dry. Last took his Lasix ?Friday. Patient denies any fever, chills, nausea, vomiting, hematemesis, cough, chest pain, palpitations, shortness of breath, abdominal pain/distension, changes in bowel or urinary habits, no hematochezia or melena.      * Acute on chronic combined systolic and diastolic heart failure  - NIC s/p HM3-DT (6/2022)  - Continue with Milrinone 0.25 and check hemoydnamics  - Repeat Echocardiogram pending   - Home Diuresis: Lasix 40 mg, daily (per patient)  - Patient appears euvolemic on exam ; CVP 13  - CVP 13 this AM. Transition back to oral PO lasix 80 qd.   - Monitor electrolytes closely. Maintain Mg >2 and K >4   - HF Pathway: Sodium restriction <2 g, Fluid restriction < 1500 mL, Strict I/Os, Daily weights    Temporal lobe seizure  - Resume Keppra  - Seizure precautions    LVAD (left ventricular assist device) present  -HeartMate 3 Implanted on 6/29/2022 as DT  -Continue Coumadin, Goal INR 2.0-3.0. Subherapeutic today: 1.7.   -Antiplatelets: INV Aspirin (Alon Trial)  -LDH is stable overall today.  Will continue to monitor daily.  -Speed set at 5100, LSL 4700 rpm  -ECHO pending. Last ECHO 9/1/22 with IV midline, AV does not open, Moderate-severe reduced RVSF, PASP 22, CVP 3, LVEDD 5.97 with LVAD speed 5100. IV milrinone increased from 0 .125 to 0.25 after RHC on 10/31/22    Procedure: Device Interrogation Including analysis of device parameters  Current Settings: Ventricular Assist Device  Review of device function is stable/unstable stable    TXP LVAD INTERROGATIONS 4/6/2023 4/6/2023 4/6/2023 4/5/2023 4/5/2023 4/5/2023  4/5/2023   Type HeartMate3 HeartMate3 HeartMate3 HeartMate3 HeartMate3 HeartMate3 HeartMate3   Flow 3.7 4.1 4.0 3.8 4.0 4.1 3.6   Speed 5100 5100 5100 5100 5100 5100 5100   PI 5.8 6.0 6.0 7.3 6.8 6.3 7.2   Power (Serna) 3.8 3.7 3.7 3.8 3.9 3.7 3.6   LSL 4700 4700 4700 4700 4700 4700 -   Low Flow Alarm - - - - - - -   High Power Alarm - - - - - - -   Pulsatility - Intermittent pulse Intermittent pulse Intermittent pulse - - -       Type 2 diabetes mellitus with hyperglycemia  - Last A1c: 9.2, Repeat A1c  - Home Regimen: Detemir 22 units BID; Aspart 14 TID  - Will start Levemir 15 units, BID and Aspart 5 TID and up-titrate while inpatient  - Endocrinology Consult        Jeff Spencer PA-C  Heart Transplant  [facility] - Cardiology Stepdown

## 2023-04-06 NOTE — ASSESSMENT & PLAN NOTE
-HeartMate 3 Implanted on 6/29/2022 as DT  -Continue Coumadin, Goal INR 2.0-3.0. Subherapeutic today: 1.7.   -Antiplatelets: INV Aspirin (Alon Trial)  -LDH is stable overall today.  Will continue to monitor daily.  -Speed set at 5100, LSL 4700 rpm  -ECHO pending. Last ECHO 9/1/22 with IV midline, AV does not open, Moderate-severe reduced RVSF, PASP 22, CVP 3, LVEDD 5.97 with LVAD speed 5100. IV milrinone increased from 0 .125 to 0.25 after RHC on 10/31/22    Procedure: Device Interrogation Including analysis of device parameters  Current Settings: Ventricular Assist Device  Review of device function is stable/unstable stable    TXP LVAD INTERROGATIONS 4/6/2023 4/6/2023 4/6/2023 4/5/2023 4/5/2023 4/5/2023 4/5/2023   Type HeartMate3 HeartMate3 HeartMate3 HeartMate3 HeartMate3 HeartMate3 HeartMate3   Flow 3.7 4.1 4.0 3.8 4.0 4.1 3.6   Speed 5100 5100 5100 5100 5100 5100 5100   PI 5.8 6.0 6.0 7.3 6.8 6.3 7.2   Power (Serna) 3.8 3.7 3.7 3.8 3.9 3.7 3.6   LSL 4700 4700 4700 4700 4700 4700 -   Low Flow Alarm - - - - - - -   High Power Alarm - - - - - - -   Pulsatility - Intermittent pulse Intermittent pulse Intermittent pulse - - -

## 2023-04-06 NOTE — PLAN OF CARE
[facility]   Heart Transplant/VAD Clinic   [address] [place] [address]   [phone] after hours          [phone] fax     VAD HOME  HEALTH ORDERS      Admit to Home Health    Diagnosis:   Patient Active Problem List   Diagnosis    Anxiety disorder, unspecified    Anemia of chronic disease    Ecstasy abuse    Cannabis use disorder, severe, dependence    Acute on chronic combined systolic and diastolic heart failure    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    Type 2 diabetes mellitus with hyperglycemia    Insomnia    SOB (shortness of breath)    Familial dilated cardiomyopathy    LVAD (left ventricular assist device) present    Tingling in extremities    Seizure-like activity    Temporal lobe seizure    Examination of participant in clinical trial    Awareness alteration, transient    Right heart failure secondary to left heart failure-post LVAD surgery    History of substance abuse    Hyperglycemia       Patient is homebound due to:  NYHA Class IV HF. S/P LVAD placement.     Diet: Low Fat, Low cholesterol, 2Gm Na, Coumadin restrictions.    Acitivities: No Swimming, bathing, vacuuming, contact sports.    Fresh implants= Sternal Precautions    Nursing:   SN to complete comprehensive assessment including routine vital signs. Instruct on disease process and s/s of complications to report to MD. Review/verify medication list sent home with the patient at time of discharge  and instruct patient/caregiver as needed. Frequency may be adjusted depending on start of care date.    **LVAD driveline exit site dressing change is to be completed per LVAD patient/caregiver only**.    Notify MD if:  SBP > 120 or < 80;   MAP > 80 or < 65;   HR > 120 or < 60;   Temp > 101;   Weight gain >3lbs in 1 day or 5lbs in 1 week.    LABS:  SN to perform labs: Weekly CBC, BMP, Magnesium    PT/INR per Coumadin clinic [phone].   Follow up INR date: 4/10/23  No Finger  Sticks    HOME INFUSION THERAPY:    SN to perform Infusion Therapy/Central Line Care.  Review Central Line Care & Central Line Flush with patient.    Administer (drug and dose): Milrinone 0.25 mcg/kg/min x 107 kg, IV continuous     Central line care:  Scrub the Hub: Prior to accessing the line, always perform a 30 second alcohol scrub  Each lumen of the central line is to be flushed at least daily with 10 mL Normal Saline and 3 mL Heparin flush (100 units/mL)  Skilled Nurse (SN) may draw blood from IV access  Blood Draw Procedure:   - Aspirate at least 5 mL of blood   - Discard   - Obtain specimen   - Change posiflow cap   - Flush with 20 mL Normal Saline followed by a                 3-5 mL Heparin flush (100 units/mL)  Central :   - Sterile dressing changes are done weekly and as needed.   - Use chlor-hexadine scrub to cleanse site, apply Biopatch to insertion site,       apply securement device dressing   - Posi-flow caps are changed weekly and after EVERY lab draw.   - If sterile gauze is under dressing to control oozing,                 dressing change must be performed every 24 hours until gauze is not needed.    CONSULTS:    Send initial Home Health orders to [facility] attending physician on call.  Send follow up questions to VAD clinic MD [phone] or [phone].

## 2023-04-06 NOTE — SUBJECTIVE & OBJECTIVE
Subjective:     Post-Op Info:  * No surgery found *            Interval History: no acute changes   Implant 6/29/2022    Medications:  Continuous Infusions:   milrinone 20mg/100ml D5W (200mcg/ml) 0.25 mcg/kg/min (04/06/23 0543)     Scheduled Meds:   famotidine  40 mg Oral Daily    gabapentin  100 mg Oral TID    insulin aspart U-100  10 Units Subcutaneous TIDWM    insulin detemir U-100  15 Units Subcutaneous BID    INV ASPIRIN 100MG/PLACEBO  100 mg Oral Daily    levETIRAcetam  1,000 mg Oral BID    mupirocin   Nasal BID    spironolactone  25 mg Oral Daily    warfarin  6 mg Oral Every Mon, Wed, Fri    warfarin  4.5 mg Oral Every Tues, Thurs, Sat, Sun     PRN Meds:cyclobenzaprine, dextrose 10%, dextrose 10%, dextrose, dextrose, glucagon (human recombinant), hydrALAZINE, insulin aspart U-100, oxyCODONE-acetaminophen     Objective:     Vital Signs (Most Recent):  Temp: 98 °F (36.7 °C) (04/06/23 0341)  Pulse: 101 (04/06/23 0618)  Resp: 18 (04/06/23 0411)  BP: (!) 84/0 (04/06/23 0415)  SpO2: 97 % (04/06/23 0411)   Vital Signs (24h Range):  Temp:  [97.8 °F (36.6 °C)-98 °F (36.7 °C)] 98 °F (36.7 °C)  Pulse:  [] 101  Resp:  [16-18] 18  SpO2:  [94 %-99 %] 97 %  BP: (78-86)/(0) 84/0       Intake/Output Summary (Last 24 hours) at 4/6/2023 0816  Last data filed at 4/6/2023 0430  Gross per 24 hour   Intake 440 ml   Output 1370 ml   Net -930 ml       Physical Exam  Constitutional:       Appearance: Normal appearance.   HENT:      Head: Normocephalic.   Eyes:      Pupils: Pupils are equal, round, and reactive to light.   Cardiovascular:      Rate and Rhythm: Normal rate.      Comments: LVAD hum is smooth  Pulmonary:      Effort: Pulmonary effort is normal.   Abdominal:      General: Abdomen is flat.   Skin:     General: Skin is warm and dry.      Coloration: Skin is not pale.   Neurological:      General: No focal deficit present.      Mental Status: He is alert.       Significant Labs:  ABGs:   Recent Labs   Lab 04/06/23  0414    PH 7.372   PCO2 49.7*   PO2 32*   HCO3 28.8*   POCSATURATED 59*   BE 4     Amylase: No results for input(s): AMYLASE in the last 48 hours.  BMP:   Recent Labs   Lab 04/06/23 0413   *      K 3.7      CO2 26   BUN 14   CREATININE 1.4   CALCIUM 9.6   MG 1.9     Cardiac markers: No results for input(s): CKMB, CPKMB, TROPONINT, TROPONINI, MYOGLOBIN in the last 48 hours.  CBC:   Recent Labs   Lab 04/06/23 0413   WBC 9.89   RBC 4.94   HGB 12.7*   HCT 40.0      MCV 81*   MCH 25.7*   MCHC 31.8*     CMP:   Recent Labs   Lab 04/05/23 0516 04/06/23 0413   * 153*   CALCIUM 8.7 9.6   ALBUMIN 3.5  --    PROT 7.2  --    * 136   K 3.8 3.7   CO2 24 26    100   BUN 13 14   CREATININE 1.3 1.4   ALKPHOS 104  --    ALT 16  --    AST 26  --    BILITOT 0.8  --      Coagulation:   Recent Labs   Lab 04/06/23 0413   INR 2.0*   APTT 32.2*     Lactic Acid: No results for input(s): LACTATE in the last 48 hours.  LFTs:   Recent Labs   Lab 04/05/23 0516   ALT 16   AST 26   ALKPHOS 104   BILITOT 0.8   PROT 7.2   ALBUMIN 3.5     Lipase: No results for input(s): LIPASE in the last 48 hours.    Significant Diagnostics:  I have reviewed all pertinent imaging results/findings within the past 24 hours.    Procedure: Device Interrogation Including analysis of device parameters  Current Settings: Ventricular Assist Device  Review of device function is stable  TXP LVAD INTERROGATIONS 4/6/2023 4/6/2023 4/5/2023 4/5/2023 4/5/2023 4/5/2023 4/5/2023   Type HeartMate3 HeartMate3 HeartMate3 HeartMate3 HeartMate3 HeartMate3 HeartMate3   Flow 4.1 4.0 3.8 4.0 4.1 3.6 3.9   Speed 5100 5100 5100 5100 5100 5100 5100   PI 6.0 6.0 7.3 6.8 6.3 7.2 6.1   Power (Serna) 3.7 3.7 3.8 3.9 3.7 3.6 3.7   LSL 4700 4700 4700 4700 4700 - 4700   Low Flow Alarm - - - - - - -   High Power Alarm - - - - - - -   Pulsatility Intermittent pulse Intermittent pulse Intermittent pulse - - - Intermittent pulse

## 2023-04-06 NOTE — SUBJECTIVE & OBJECTIVE
Interval History: NAEON. No complaints. Discharging home today.      Continuous Infusions:   milrinone 20mg/100ml D5W (200mcg/ml) 0.25 mcg/kg/min (04/06/23 0543)     Scheduled Meds:   famotidine  40 mg Oral Daily    gabapentin  100 mg Oral TID    insulin aspart U-100  10 Units Subcutaneous TIDWM    insulin detemir U-100  15 Units Subcutaneous BID    INV ASPIRIN 100MG/PLACEBO  100 mg Oral Daily    levETIRAcetam  1,000 mg Oral BID    mupirocin   Nasal BID    spironolactone  25 mg Oral Daily    warfarin  6 mg Oral Every Mon, Wed, Fri    warfarin  4.5 mg Oral Every Tues, Thurs, Sat, Sun     PRN Meds:cyclobenzaprine, dextrose 10%, dextrose 10%, dextrose, dextrose, glucagon (human recombinant), hydrALAZINE, insulin aspart U-100, oxyCODONE-acetaminophen    Review of patient's allergies indicates:   Allergen Reactions    Aspirin Other (See Comments)     Mr. Thacker is enrolled in Dr. Paige's Alon Trial and cannot have any aspirin and/or aspirin-containing products. DO NOT cancel any orders for INV Aspirin 100 mg/Placebo. If you have questions, please contact Isabel @ [phone], [phone],[email or web address], secure chat or MS Teams message.    Bumex [bumetanide] Hives    Lactose Diarrhea     Other reaction(s): Abdominal distension, gaseous    Torsemide Hives     Objective:     Vital Signs (Most Recent):  Temp: 97.6 °F (36.4 °C) (04/06/23 0800)  Pulse: 104 (04/06/23 0800)  Resp: 16 (04/06/23 0800)  BP: (!) 82/0 (04/06/23 0800)  SpO2: 99 % (04/06/23 0800)   Vital Signs (24h Range):  Temp:  [97.6 °F (36.4 °C)-98 °F (36.7 °C)] 97.6 °F (36.4 °C)  Pulse:  [] 104  Resp:  [16-18] 16  SpO2:  [94 %-99 %] 99 %  BP: (82-86)/(0) 82/0     Patient Vitals for the past 72 hrs (Last 3 readings):   Weight   04/06/23 0800 101.9 kg (224 lb 10.4 oz)   04/05/23 1700 102.9 kg (226 lb 13.7 oz)   04/04/23 1411 106.1 kg (234 lb)       Body mass index is 28.08 kg/m².      Intake/Output Summary (Last 24 hours) at 4/6/2023 1118  Last data  filed at 4/6/2023 0430  Gross per 24 hour   Intake 440 ml   Output 1370 ml   Net -930 ml         Hemodynamic Parameters:  CVP:  [13 mmHg] 13 mmHg      Physical Exam  Vitals and nursing note reviewed.   HENT:      Head: Normocephalic and atraumatic.      Nose: Nose normal.   Eyes:      Conjunctiva/sclera: Conjunctivae normal.   Neck:      Vascular: JVD present.   Cardiovascular:      Rate and Rhythm: Normal rate and regular rhythm.      Comments: Smooth VAD hum   Pulmonary:      Effort: Pulmonary effort is normal.   Abdominal:      General: Abdomen is flat. There is no distension.   Musculoskeletal:         General: Normal range of motion.      Cervical back: Normal range of motion.   Skin:     General: Skin is warm.   Neurological:      General: No focal deficit present.      Mental Status: He is alert.   Psychiatric:         Mood and Affect: Mood normal.         Behavior: Behavior normal.         Thought Content: Thought content normal.         Judgment: Judgment normal.       Significant Labs:  CBC:  Recent Labs   Lab 04/04/23  0450 04/05/23  0440 04/05/23  0516 04/06/23  0413   WBC 9.57  --  11.79 9.89   RBC 4.23*  --  4.25* 4.94   HGB 11.0*  --  11.0* 12.7*   HCT 35.3* 38 34.8* 40.0     --  292 350   MCV 84  --  82 81*   MCH 26.0*  --  25.9* 25.7*   MCHC 31.2*  --  31.6* 31.8*       BNP:  Recent Labs   Lab 04/02/23  0515   .4*       CMP:  Recent Labs   Lab 04/02/23  1151 04/02/23  1614 04/03/23  0444 04/04/23  0449 04/05/23  0516 04/06/23  0413   *   < > 195* 167* 174* 153*   CALCIUM 8.9   < > 8.9 9.2 8.7 9.6   ALBUMIN 3.7  --  3.7  --  3.5  --    PROT 7.5  --  7.5  --  7.2  --       < > 137 138 134* 136   K 3.4*   < > 3.6 3.9 3.8 3.7   CO2 24   < > 23 25 24 26      < > 103 104 102 100   BUN 10   < > 13 12 13 14   CREATININE 1.3   < > 1.4 1.4 1.3 1.4   ALKPHOS 101  --  107  --  104  --    ALT 15  --  16  --  16  --    AST 22  --  25  --  26  --    BILITOT 1.2*  --  0.9  --  0.8   --     < > = values in this interval not displayed.        Coagulation:   Recent Labs   Lab 04/04/23  0450 04/05/23  0516 04/06/23  0413   INR 1.7* 1.8* 2.0*   APTT 29.8 32.4* 32.2*       LDH:  Recent Labs   Lab 04/04/23  0449 04/05/23  0516 04/06/23  0413    311* 268*       Microbiology:  Microbiology Results (last 7 days)       ** No results found for the last 168 hours. **            I have reviewed all pertinent labs within the past 24 hours.    Estimated Creatinine Clearance: 92.6 mL/min (based on SCr of 1.4 mg/dL).    Diagnostic Results:  I have reviewed all pertinent imaging results/findings within the past 24 hours.

## 2023-04-06 NOTE — PROGRESS NOTES
DISCHARGE NOTE:    Kevan Queen is a 38 y.o. male s/p HM3 LVAD admitted for AHF after interruption of home milrinone.     Hospital Course: Milrinone was resumed and diuresis was augmented temporarily while inpatient.     Pharmacy Interventions/Recommendations:  1) INR Goal: 2-3     2) Antiplatelet Agents: GASTON trial     3) Heparin Bridging:  yes     4) INR Follow-Up/Discharge Needs:  repeat INR next week.     See list of discharge medication for dosing instructions.     Kevan Queen and his caregiver verbalized their understanding and had the opportunity to ask questions.      Discharge Medications:     Medication List        CHANGE how you take these medications      insulin aspart U-100 100 unit/mL (3 mL) Inpn pen  Commonly known as: NovoLOG  Inject 16 Units into the skin 3 (three) times daily with meals. Plus Sliding Scale: 151-200: +1, 201-250: +2, 251-300: +3, 301-350: +4 and call MD; Max Daily Dose: 60 units  What changed: Another medication with the same name was removed. Continue taking this medication, and follow the directions you see here.     warfarin 3 MG tablet  Commonly known as: COUMADIN  Take 1.5 tablets (4.5mg) orally daily, except 2 tablets (6mg) on Wednesdays  What changed: additional instructions            CONTINUE taking these medications      busPIRone 7.5 MG tablet  Commonly known as: BUSPAR  Take 1 tablet (7.5 mg total) by mouth once daily at 6am.     famotidine 40 MG tablet  Commonly known as: PEPCID  Take 1 tablet (40 mg total) by mouth once daily.     ferrous gluconate 324 mg (37.5 mg iron) Tab tablet  Take 1 tablet (324 mg total) by mouth daily with breakfast.     furosemide 80 MG tablet  Commonly known as: LASIX  Take 1 tablet (80 mg total) by mouth once daily.     gabapentin 100 MG capsule  Commonly known as: NEURONTIN  Take 1 capsule (100 mg total) by mouth 3 (three) times daily.     HYDROcodone-acetaminophen 5-325 mg per tablet  Commonly known as: NORCO  Take 1 tablet by mouth  "every 8 (eight) hours as needed for Pain.     INV ASPIRIN 100MG/PLACEBO  Take 1 capsule (100 mg) by mouth once daily. FOR INVESTIGATIONAL USE ONLY. Protocol: GASTON ACKERMAN subject # 9097.     LEVEMIR FLEXTOUCH U-100 INSULN 100 unit/mL (3 mL) Inpn pen  Generic drug: insulin detemir U-100 (Levemir)  Inject 22 Units into the skin 2 (two) times daily.     levETIRAcetam 1000 MG tablet  Commonly known as: KEPPRA  Take 1 tablet (1,000 mg total) by mouth 2 (two) times daily.     milrinone 20mg/100ml D5W (200mcg/ml) 20 mg/100 mL (200 mcg/mL) infusion  Commonly known as: PRIMACOR  Inject 34.875 mcg/min into the vein continuous.     mirtazapine 15 MG tablet  Commonly known as: REMERON  Take 1 tablet (15 mg total) by mouth nightly.     potassium chloride SA 20 MEQ tablet  Commonly known as: K-DUR,KLOR-CON  Take 2 tablets (40 mEq total) by mouth 3 (three) times daily.     spironolactone 25 MG tablet  Commonly known as: ALDACTONE  Take 1 tablet (25 mg total) by mouth once daily.     TRUE METRIX GLUCOSE METER Misc  Generic drug: blood-glucose meter  Use as instructed     TRUE METRIX GLUCOSE TEST STRIP Strp  Generic drug: blood sugar diagnostic  Use to test blood glucose 4 (four) times daily.     TRUEPLUS LANCETS 33 gauge Misc  Generic drug: lancets  Use to test blood glucose 4 (four) times daily.            STOP taking these medications      BD ULTRA-FINE GARTH PEN NEEDLE 32 gauge x 5/32" Ndle  Generic drug: pen needle, diabetic     pen needle, diabetic 31 gauge x 1/4" Ndle     pen needle, diabetic 32 gauge x 1/4" Ndle               Where to Get Your Medications        Information about where to get these medications is not yet available    Ask your nurse or doctor about these medications  warfarin 3 MG tablet        "

## 2023-04-06 NOTE — PROGRESS NOTES
Discharge Note:  TREY notified of pt's dc today.  TREY contacted Zulma with [facility] to notify of dc.  Zulma to work on orders which were placed.  TREY met with pt who voiced understanding and agreement to dc plan today.  No other needs indicated by team or pt at this time   [facility]: [phone]

## 2023-04-06 NOTE — PLAN OF CARE
Pt educated on fall risk, pt remained free from falls/trauma/injury. LVAD numbers and dopplers WNL. No LFA's overnight. CVP 13  this morning. Milrinone drip infusing per MAR. Diuresing with IVP lasix. Wife changed the LVAD dsg using kit and sterile technique. Blood glucose monitored. Plan of care reviewed with pt. Bed locked in lowest position, call bell within reach, no acute distress noted, will continue to monitor.

## 2023-04-06 NOTE — CARE UPDATE
-Glucose Goal 140-180    -A1C:   Hemoglobin A1C   Date Value Ref Range Status   04/02/2023 7.9 (H) 4.0 - 5.6 % Final     Comment:     ADA Screening Guidelines:  5.7-6.4%  Consistent with prediabetes  >or=6.5%  Consistent with diabetes    High levels of fetal hemoglobin interfere with the HbA1C  assay. Heterozygous hemoglobin variants (HbS, HgC, etc)do  not significantly interfere with this assay.   However, presence of multiple variants may affect accuracy.           -HOME REGIMEN: -Levemir 22 units BID.   -Novolog 16 units TIDWM in addition to the following correction scale:     150 - 200 + 1 unit     201 - 250 + 2 units     251 - 300 + 3 units     301 - 350 + 4 units      > 350   + 5 units    -GLUCOSE TREND FOR THE PAST 24HRS:   Recent Labs   Lab 04/05/23  1215 04/05/23  1642 04/05/23  2046 04/06/23  0128 04/06/23  1208 04/06/23  1647   POCTGLUCOSE 98 168* 172* 148* 93 151*           -NO HYPOGYCEMIAS NOTED     - Diet  Diet Cardiac    T2DM on insulin at home.  Remains in 302/302 A. Admitted for HF.  BG currently stable on regimen; no changes to current regimen.       Plan:   - Continue Levemir (Insulin Detemir) 15 units BID  - Continue Novolog (Insulin Aspart) 10 units TIDWM   - Continue Novolog [facility] (150/25)  - BG checks /HS/0200  - Hypoglycemia protocol in place      ** Please notify Endocrine for any change and/or advance in diet**  ** Please call Endocrine for any BG related issues **     Discharge Planning:   TBD. Please notify endocrinology prior to discharge.

## 2023-04-07 NOTE — NURSING
Went over discharge informations with patient and spouse at bedside. Educated patient and spouse about medications. Verified that patient and spouse knew how to manage home medication pump. Discharge paperwork also given to patient before departure.

## 2023-04-07 NOTE — PLAN OF CARE
"DELLA was contacted by nurse Bradley regarding patient's medications not being delivered at bedside. DELLA spoke with ER clinical pharmacist and was told that the medications that they do not give.for home use. Della read in chart that the patient is getting aftercare by [facility]. DELLA contacted [facility]/[employer] #[phone]. According to [facility] "jose" states that the patient medication will be delivered to his room at this facility in an hour. DELLA contacted nurse bradley ext:[phone] and made her aware.     RAE Collins, MSW-LMSW  Medical Social Worker/  ER Department     "

## 2023-04-08 NOTE — HOSPITAL COURSE
Patient was admitted on 4/2 for ADHF after milrinone being off. He was diuresed on IV push lasix and responded well. Diuresed for a total -5.6L. He discharged home on 4/6. He has follow up in [facility] clinic on 4/19.

## 2023-04-08 NOTE — DISCHARGE SUMMARY
[facility] - Cardiology Stepdown  Heart Transplant  Discharge Summary      Patient Name: Kevan Queen  MRN: [id number]  Admission Date: 4/2/2023  Hospital Length of Stay: 4 days  Discharge Date and Time: 04/06/2023 10:33 AM  Attending Physician: No att. providers found   Discharging Provider: Jeff Spencer PA-C  Primary Care Provider: ORALIA Cline     HPI: 36 yo black male with stage d HFrEF, NICM, ? 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 He underwent RVAD removal and chest closure 6/30/2022.  He also completed a course of IV Abx for staph epi. He was weaned off  but he had to restarted due to RVF. He was eventually transitioned to milrinone (secondary to  shortage) now on 0.25 mcg/kg/min. He also has a history of unclear syncope vs seizures and is followed by neuro for this and is on Keppra. Patient presents today as a transfer from [place] after he presented there after he called about running out of his milrinone. Wife was at bedside that states, she has been in contact with everyone but a few weeks ago she had changed her phone number and lost contact at one point with someone in infusion. Patient overall denies any complaints, celebrated his birthday yesterday. He was lat seen in clinic with Dr. Villatoro on 3/23. Reports compliance with all his medication. He does however, report that he takes his lasix but not 80 mg, daily he started taking 40 mg, daily and at times he wouldn't because he feels dry. Last took his Lasix ?Friday. Patient denies any fever, chills, nausea, vomiting, hematemesis, cough, chest pain, palpitations, shortness of breath, abdominal pain/distension, changes in bowel or urinary habits, no hematochezia or melena.      * No surgery found *     Hospital Course: Patient was admitted  on 4/2 for ADHF after milrinone being off. He was diuresed on IV push lasix and responded well. Diuresed for a total -5.6L. He discharged home on 4/6. He has follow up in [facility] clinic on 4/19.       Goals of Care Treatment Preferences:  Code Status: Full Code    Health care agent: Malou Dumont  [facility] care agent number: [phone]                   Consults (From admission, onward)          Status Ordering Provider     Inpatient consult to Endocrinology  Once        Provider:  (Not yet assigned)    Completed EDER STANFORD     Inpatient consult to Registered Dietitian/Nutritionist  Once        Provider:  (Not yet assigned)    Completed EDER STANFORD            Significant Diagnostic Studies: Labs: All labs within the past 24 hours have been reviewed    Pending Diagnostic Studies:       None          Final Active Diagnoses:    Diagnosis Date Noted POA    PRINCIPAL PROBLEM:  Acute on chronic combined systolic and diastolic heart failure [I50.43] 11/05/2020 Yes    Temporal lobe seizure [G40.109] 07/20/2022 Yes    LVAD (left ventricular assist device) present [Z95.811] 06/30/2022 Not Applicable    Type 2 diabetes mellitus with hyperglycemia [E11.65] 05/25/2022 Yes      Problems Resolved During this Admission:      Discharged Condition: stable    Disposition: Home or Self Care    Follow Up:    Patient Instructions:   No discharge procedures on file.  Medications:  Reconciled Home Medications:      Medication List        CHANGE how you take these medications      insulin aspart U-100 100 unit/mL (3 mL) Inpn pen  Commonly known as: NovoLOG  Inject 16 Units into the skin 3 (three) times daily with meals. Plus Sliding Scale: 151-200: +1, 201-250: +2, 251-300: +3, 301-350: +4 and call MD; Max Daily Dose: 60 units  What changed: Another medication with the same name was removed. Continue taking this medication, and follow the directions you see here.     warfarin 3 MG tablet  Commonly known as: COUMADIN  Take 1.5 tablets  (4.5mg) orally daily, except 2 tablets (6mg) on Wednesdays  What changed: additional instructions            CONTINUE taking these medications      busPIRone 7.5 MG tablet  Commonly known as: BUSPAR  Take 1 tablet (7.5 mg total) by mouth once daily at 6am.     famotidine 40 MG tablet  Commonly known as: PEPCID  Take 1 tablet (40 mg total) by mouth once daily.     ferrous gluconate 324 mg (37.5 mg iron) Tab tablet  Take 1 tablet (324 mg total) by mouth daily with breakfast.     furosemide 80 MG tablet  Commonly known as: LASIX  Take 1 tablet (80 mg total) by mouth once daily.     gabapentin 100 MG capsule  Commonly known as: NEURONTIN  Take 1 capsule (100 mg total) by mouth 3 (three) times daily.     HYDROcodone-acetaminophen 5-325 mg per tablet  Commonly known as: NORCO  Take 1 tablet by mouth every 8 (eight) hours as needed for Pain.     INV ASPIRIN 100MG/PLACEBO  Take 1 capsule (100 mg) by mouth once daily. FOR INVESTIGATIONAL USE ONLY. Protocol: GASTON III subject # 0367.     LEVEMIR FLEXTOUCH U-100 INSULN 100 unit/mL (3 mL) Inpn pen  Generic drug: insulin detemir U-100 (Levemir)  Inject 22 Units into the skin 2 (two) times daily.     levETIRAcetam 1000 MG tablet  Commonly known as: KEPPRA  Take 1 tablet (1,000 mg total) by mouth 2 (two) times daily.     milrinone 20mg/100ml D5W (200mcg/ml) 20 mg/100 mL (200 mcg/mL) infusion  Commonly known as: PRIMACOR  Inject 34.875 mcg/min into the vein continuous.     mirtazapine 15 MG tablet  Commonly known as: REMERON  Take 1 tablet (15 mg total) by mouth nightly.     potassium chloride SA 20 MEQ tablet  Commonly known as: K-DUR,KLOR-CON  Take 2 tablets (40 mEq total) by mouth 3 (three) times daily.     spironolactone 25 MG tablet  Commonly known as: ALDACTONE  Take 1 tablet (25 mg total) by mouth once daily.     TRUE METRIX GLUCOSE METER Misc  Generic drug: blood-glucose meter  Use as instructed     TRUE METRIX GLUCOSE TEST STRIP Strp  Generic drug: blood sugar  "diagnostic  Use to test blood glucose 4 (four) times daily.     TRUEPLUS LANCETS 33 gauge Misc  Generic drug: lancets  Use to test blood glucose 4 (four) times daily.            STOP taking these medications      BD ULTRA-FINE GARTH PEN NEEDLE 32 gauge x 5/32" Ndle  Generic drug: pen needle, diabetic     pen needle, diabetic 31 gauge x 1/4" Ndle     pen needle, diabetic 32 gauge x 1/4" Ndle              Jeff Spencer PA-C  Heart Transplant  [facility] - Cardiology Stepdown  "

## 2023-04-10 NOTE — PROGRESS NOTES
Spoke to Ms. Wright, regarding discharge medication dosing. She confirmed a discharge warfarin dose of 6 mg every Wednesday, and 4.5 mg all other days. In addition, she verified that patient took his home dose over the weekend.  INR scheduled for 04.11.23.

## 2023-04-10 NOTE — PROGRESS NOTES
Hospital Course: Patient was admitted on 4/2 for ADHF after milrinone being off. He was diuresed on IV push lasix and responded well. Diuresed for a total -5.6L. He discharged home on 4/6. He has follow up in [facility] clinic on 4/19.

## 2023-04-11 LAB
EXT ALBUMIN: 4.4
EXT ALT: 53
EXT AST: 62
EXT BILIRUBIN TOTAL: 0.5
EXT BUN: 13
EXT CALCIUM: 9.9
EXT CHLORIDE: 103
EXT CREATININE: 1.36 MG/DL
EXT GLUCOSE: 140
EXT HEMATOCRIT: 41.4
EXT HEMOGLOBIN: 13.2
EXT MAGNESIUM: 1.8
EXT PLATELETS: 401
EXT POTASSIUM: 4.5
EXT PROTEIN TOTAL: 8.3
EXT SODIUM: 141 MMOL/L
EXT WBC: 9.9

## 2023-04-12 ENCOUNTER — ANTI-COAG VISIT (OUTPATIENT)
Dept: CARDIOLOGY | Facility: CLINIC | Age: 38
End: 2023-04-12
Payer: MEDICAID

## 2023-04-12 ENCOUNTER — TELEPHONE (OUTPATIENT)
Dept: TRANSPLANT | Facility: CLINIC | Age: 38
End: 2023-04-12
Payer: MEDICAID

## 2023-04-12 DIAGNOSIS — Z95.811 LVAD (LEFT VENTRICULAR ASSIST DEVICE) PRESENT: Primary | ICD-10-CM

## 2023-04-12 LAB — INR PPP: 1.8

## 2023-04-12 PROCEDURE — 93793 ANTICOAG MGMT PT WARFARIN: CPT | Mod: ,,,

## 2023-04-12 PROCEDURE — 93793 PR ANTICOAGULANT MGMT FOR PT TAKING WARFARIN: ICD-10-PCS | Mod: ,,,

## 2023-04-12 NOTE — PROGRESS NOTES
Pts significant other called back and was questioned. She stated pt has taken all correct doses and has not missed any. She did state that pt said when he took his warfarin the other day and he could taste blood after taking the pill but did not see any blood and it was only that one time. No other changes to be reported at this time.

## 2023-04-13 ENCOUNTER — TELEPHONE (OUTPATIENT)
Dept: TRANSPLANT | Facility: CLINIC | Age: 38
End: 2023-04-13
Payer: MEDICAID

## 2023-04-14 NOTE — PROGRESS NOTES
Darlyn Wright Called to reschedule 4/17/23 labs to 4/19/23 .  Also reports patient taken four tablets (12 mg ) on 4/11/23 by accident . Darlyn stated that patient is feeling fine - denies any dark stools , bleeding , bruising , SOB , or etc. Declined to redraw 4/14/23.

## 2023-04-18 ENCOUNTER — TELEPHONE (OUTPATIENT)
Dept: TRANSPLANT | Facility: CLINIC | Age: 38
End: 2023-04-18
Payer: MEDICAID

## 2023-04-18 ENCOUNTER — HOSPITAL ENCOUNTER (OUTPATIENT)
Facility: HOSPITAL | Age: 38
LOS: 1 days | Discharge: HOME | End: 2023-04-19
Attending: STUDENT IN AN ORGANIZED HEALTH CARE EDUCATION/TRAINING PROGRAM | Admitting: INTERNAL MEDICINE
Payer: MEDICAID

## 2023-04-18 DIAGNOSIS — R07.9 CHEST PAIN: ICD-10-CM

## 2023-04-18 DIAGNOSIS — I50.43 ACUTE ON CHRONIC COMBINED SYSTOLIC AND DIASTOLIC HEART FAILURE: Primary | ICD-10-CM

## 2023-04-18 DIAGNOSIS — Z95.811 LVAD (LEFT VENTRICULAR ASSIST DEVICE) PRESENT: ICD-10-CM

## 2023-04-18 LAB
ALBUMIN SERPL BCP-MCNC: 4 G/DL (ref 3.5–5.2)
ALP SERPL-CCNC: 113 U/L (ref 55–135)
ALT SERPL W/O P-5'-P-CCNC: 41 U/L (ref 10–44)
ANION GAP SERPL CALC-SCNC: 9 MMOL/L (ref 8–16)
AST SERPL-CCNC: 42 U/L (ref 10–40)
BASOPHILS # BLD AUTO: 0.04 K/UL (ref 0–0.2)
BASOPHILS NFR BLD: 0.4 % (ref 0–1.9)
BILIRUB SERPL-MCNC: 0.5 MG/DL (ref 0.1–1)
BNP SERPL-MCNC: 289 PG/ML (ref 0–99)
BUN SERPL-MCNC: 6 MG/DL (ref 6–20)
CALCIUM SERPL-MCNC: 9.6 MG/DL (ref 8.7–10.5)
CHLORIDE SERPL-SCNC: 109 MMOL/L (ref 95–110)
CO2 SERPL-SCNC: 20 MMOL/L (ref 23–29)
CREAT SERPL-MCNC: 1.4 MG/DL (ref 0.5–1.4)
DIFFERENTIAL METHOD: ABNORMAL
EOSINOPHIL # BLD AUTO: 0.1 K/UL (ref 0–0.5)
EOSINOPHIL NFR BLD: 0.7 % (ref 0–8)
ERYTHROCYTE [DISTWIDTH] IN BLOOD BY AUTOMATED COUNT: 18.2 % (ref 11.5–14.5)
EST. GFR  (NO RACE VARIABLE): >60 ML/MIN/1.73 M^2
GLUCOSE SERPL-MCNC: 138 MG/DL (ref 70–110)
HCT VFR BLD AUTO: 37.9 % (ref 40–54)
HGB BLD-MCNC: 11.7 G/DL (ref 14–18)
IMM GRANULOCYTES # BLD AUTO: 0.02 K/UL (ref 0–0.04)
IMM GRANULOCYTES NFR BLD AUTO: 0.2 % (ref 0–0.5)
INR PPP: 2.6 (ref 0.8–1.2)
LDH SERPL L TO P-CCNC: 435 U/L (ref 110–260)
LYMPHOCYTES # BLD AUTO: 2.6 K/UL (ref 1–4.8)
LYMPHOCYTES NFR BLD: 26.2 % (ref 18–48)
MAGNESIUM SERPL-MCNC: 1.6 MG/DL (ref 1.6–2.6)
MCH RBC QN AUTO: 25.3 PG (ref 27–31)
MCHC RBC AUTO-ENTMCNC: 30.9 G/DL (ref 32–36)
MCV RBC AUTO: 82 FL (ref 82–98)
MONOCYTES # BLD AUTO: 0.7 K/UL (ref 0.3–1)
MONOCYTES NFR BLD: 7.3 % (ref 4–15)
NEUTROPHILS # BLD AUTO: 6.4 K/UL (ref 1.8–7.7)
NEUTROPHILS NFR BLD: 65.2 % (ref 38–73)
NRBC BLD-RTO: 0 /100 WBC
PLATELET # BLD AUTO: 240 K/UL (ref 150–450)
PMV BLD AUTO: 9.7 FL (ref 9.2–12.9)
POTASSIUM SERPL-SCNC: 4.2 MMOL/L (ref 3.5–5.1)
PROT SERPL-MCNC: 8.4 G/DL (ref 6–8.4)
PROTHROMBIN TIME: 26.1 SEC (ref 9–12.5)
RBC # BLD AUTO: 4.63 M/UL (ref 4.6–6.2)
SODIUM SERPL-SCNC: 138 MMOL/L (ref 136–145)
TROPONIN I SERPL DL<=0.01 NG/ML-MCNC: 0.15 NG/ML (ref 0–0.03)
WBC # BLD AUTO: 9.76 K/UL (ref 3.9–12.7)

## 2023-04-18 PROCEDURE — 85610 PROTHROMBIN TIME: CPT | Performed by: EMERGENCY MEDICINE

## 2023-04-18 PROCEDURE — 84484 ASSAY OF TROPONIN QUANT: CPT | Performed by: EMERGENCY MEDICINE

## 2023-04-18 PROCEDURE — 84443 ASSAY THYROID STIM HORMONE: CPT | Performed by: EMERGENCY MEDICINE

## 2023-04-18 PROCEDURE — 93010 EKG 12-LEAD: ICD-10-PCS | Mod: ,,, | Performed by: INTERNAL MEDICINE

## 2023-04-18 PROCEDURE — 80053 COMPREHEN METABOLIC PANEL: CPT | Performed by: EMERGENCY MEDICINE

## 2023-04-18 PROCEDURE — 85025 COMPLETE CBC W/AUTO DIFF WBC: CPT | Performed by: EMERGENCY MEDICINE

## 2023-04-18 PROCEDURE — 99285 EMERGENCY DEPT VISIT HI MDM: CPT | Mod: ,,, | Performed by: STUDENT IN AN ORGANIZED HEALTH CARE EDUCATION/TRAINING PROGRAM

## 2023-04-18 PROCEDURE — 93010 ELECTROCARDIOGRAM REPORT: CPT | Mod: ,,, | Performed by: INTERNAL MEDICINE

## 2023-04-18 PROCEDURE — 83615 LACTATE (LD) (LDH) ENZYME: CPT | Performed by: EMERGENCY MEDICINE

## 2023-04-18 PROCEDURE — 63600175 PHARM REV CODE 636 W HCPCS: Performed by: STUDENT IN AN ORGANIZED HEALTH CARE EDUCATION/TRAINING PROGRAM

## 2023-04-18 PROCEDURE — 96374 THER/PROPH/DIAG INJ IV PUSH: CPT

## 2023-04-18 PROCEDURE — 82962 GLUCOSE BLOOD TEST: CPT

## 2023-04-18 PROCEDURE — 93005 ELECTROCARDIOGRAM TRACING: CPT

## 2023-04-18 PROCEDURE — 96375 TX/PRO/DX INJ NEW DRUG ADDON: CPT

## 2023-04-18 PROCEDURE — 99285 EMERGENCY DEPT VISIT HI MDM: CPT | Mod: 25

## 2023-04-18 PROCEDURE — 99285 PR EMERGENCY DEPT VISIT,LEVEL V: ICD-10-PCS | Mod: ,,, | Performed by: STUDENT IN AN ORGANIZED HEALTH CARE EDUCATION/TRAINING PROGRAM

## 2023-04-18 PROCEDURE — 83880 ASSAY OF NATRIURETIC PEPTIDE: CPT | Performed by: EMERGENCY MEDICINE

## 2023-04-18 PROCEDURE — 83735 ASSAY OF MAGNESIUM: CPT | Performed by: EMERGENCY MEDICINE

## 2023-04-18 PROCEDURE — 63600175 PHARM REV CODE 636 W HCPCS: Performed by: INTERNAL MEDICINE

## 2023-04-18 RX ORDER — SPIRONOLACTONE 25 MG/1
25 TABLET ORAL DAILY
Status: DISCONTINUED | OUTPATIENT
Start: 2023-04-19 | End: 2023-04-19 | Stop reason: HOSPADM

## 2023-04-18 RX ORDER — INSULIN ASPART 100 [IU]/ML
0-16 INJECTION, SOLUTION INTRAVENOUS; SUBCUTANEOUS
Status: DISCONTINUED | OUTPATIENT
Start: 2023-04-19 | End: 2023-04-19 | Stop reason: HOSPADM

## 2023-04-18 RX ORDER — LEVETIRACETAM 500 MG/1
1000 TABLET ORAL 2 TIMES DAILY
Status: DISCONTINUED | OUTPATIENT
Start: 2023-04-19 | End: 2023-04-19 | Stop reason: HOSPADM

## 2023-04-18 RX ORDER — MILRINONE LACTATE 0.2 MG/ML
0.25 INJECTION, SOLUTION INTRAVENOUS CONTINUOUS
Status: DISCONTINUED | OUTPATIENT
Start: 2023-04-18 | End: 2023-04-19 | Stop reason: HOSPADM

## 2023-04-18 RX ORDER — GABAPENTIN 100 MG/1
100 CAPSULE ORAL 3 TIMES DAILY
Status: DISCONTINUED | OUTPATIENT
Start: 2023-04-19 | End: 2023-04-19 | Stop reason: HOSPADM

## 2023-04-18 RX ORDER — DEXTROSE 40 %
15 GEL (GRAM) ORAL
Status: DISCONTINUED | OUTPATIENT
Start: 2023-04-19 | End: 2023-04-19 | Stop reason: HOSPADM

## 2023-04-18 RX ORDER — FAMOTIDINE 20 MG/1
40 TABLET, FILM COATED ORAL DAILY
Status: DISCONTINUED | OUTPATIENT
Start: 2023-04-19 | End: 2023-04-19 | Stop reason: HOSPADM

## 2023-04-18 RX ORDER — POTASSIUM CHLORIDE 20 MEQ/1
40 TABLET, EXTENDED RELEASE ORAL 3 TIMES DAILY
Status: DISCONTINUED | OUTPATIENT
Start: 2023-04-19 | End: 2023-04-19 | Stop reason: HOSPADM

## 2023-04-18 RX ORDER — VALSARTAN 40 MG/1
40 TABLET ORAL 2 TIMES DAILY
Status: DISCONTINUED | OUTPATIENT
Start: 2023-04-19 | End: 2023-04-19

## 2023-04-18 RX ORDER — DEXTROSE 40 %
30 GEL (GRAM) ORAL
Status: DISCONTINUED | OUTPATIENT
Start: 2023-04-19 | End: 2023-04-19 | Stop reason: HOSPADM

## 2023-04-18 RX ORDER — FUROSEMIDE 10 MG/ML
40 INJECTION INTRAMUSCULAR; INTRAVENOUS ONCE
Status: COMPLETED | OUTPATIENT
Start: 2023-04-18 | End: 2023-04-18

## 2023-04-18 RX ORDER — GLUCAGON 1 MG
1 KIT INJECTION
Status: DISCONTINUED | OUTPATIENT
Start: 2023-04-19 | End: 2023-04-19 | Stop reason: HOSPADM

## 2023-04-18 RX ADMIN — FUROSEMIDE 40 MG: 10 INJECTION, SOLUTION INTRAMUSCULAR; INTRAVENOUS at 11:04

## 2023-04-18 RX ADMIN — MILRINONE LACTATE IN DEXTROSE 0.25 MCG/KG/MIN: 200 INJECTION, SOLUTION INTRAVENOUS at 11:04

## 2023-04-18 NOTE — TELEPHONE ENCOUNTER
Patient is having numbness and tingling in his face and down his arm as well as a burning sensation in his chest. Advised to report to local ER immediately. Pt s/o states they do not trust their local ER as they never know how to treat him with his VAD. States they are driving to [facility] for evaluation. On call provider, Dr Villatoro, and primary VC, Shena Turpin, notified.

## 2023-04-18 NOTE — Clinical Note
Diagnosis: LVAD (left ventricular assist device) present [436365]   Admitting Provider:: KATRINA CLARK [[id number]]   Future Attending Provider: KATRINA CLARK [[id number]]   Bed request comments: [facility]- 3rd floor   Reason for IP Medical Treatment  (Clinical interventions that can only be accomplished in the IP setting? ) :: LVAD here for CP   I certify that Inpatient services for greater than or equal to 2 midnights are medically necessary:: Yes   Plans for Post-Acute care--if anticipated (pick the single best option):: A. No post acute care anticipated at this time

## 2023-04-19 ENCOUNTER — HOSPITAL ENCOUNTER (OUTPATIENT)
Dept: CARDIOLOGY | Facility: HOSPITAL | Age: 38
Discharge: HOME | End: 2023-04-19
Attending: INTERNAL MEDICINE
Payer: MEDICAID

## 2023-04-19 ENCOUNTER — CLINICAL SUPPORT (OUTPATIENT)
Dept: TRANSPLANT | Facility: CLINIC | Age: 38
End: 2023-04-19
Payer: MEDICAID

## 2023-04-19 ENCOUNTER — OFFICE VISIT (OUTPATIENT)
Dept: TRANSPLANT | Facility: CLINIC | Age: 38
End: 2023-04-19
Attending: INTERNAL MEDICINE
Payer: MEDICAID

## 2023-04-19 VITALS — BODY MASS INDEX: 27.85 KG/M2 | HEART RATE: 70 BPM | HEIGHT: 75 IN | WEIGHT: 224 LBS

## 2023-04-19 VITALS
RESPIRATION RATE: 20 BRPM | OXYGEN SATURATION: 97 % | WEIGHT: 224 LBS | BODY MASS INDEX: 28 KG/M2 | HEART RATE: 101 BPM | SYSTOLIC BLOOD PRESSURE: 96 MMHG | TEMPERATURE: 98 F

## 2023-04-19 VITALS
SYSTOLIC BLOOD PRESSURE: 90 MMHG | HEART RATE: 120 BPM | BODY MASS INDEX: 27.6 KG/M2 | WEIGHT: 222 LBS | TEMPERATURE: 98 F | HEIGHT: 75 IN

## 2023-04-19 DIAGNOSIS — Z95.811 LVAD (LEFT VENTRICULAR ASSIST DEVICE) PRESENT: ICD-10-CM

## 2023-04-19 DIAGNOSIS — E11.65 TYPE 2 DIABETES MELLITUS WITH HYPERGLYCEMIA, WITH LONG-TERM CURRENT USE OF INSULIN: ICD-10-CM

## 2023-04-19 DIAGNOSIS — I50.814 RIGHT HEART FAILURE SECONDARY TO LEFT HEART FAILURE: ICD-10-CM

## 2023-04-19 DIAGNOSIS — R56.9 SEIZURE-LIKE ACTIVITY: Primary | ICD-10-CM

## 2023-04-19 DIAGNOSIS — R40.4 AWARENESS ALTERATION, TRANSIENT: ICD-10-CM

## 2023-04-19 DIAGNOSIS — G40.109 TEMPORAL LOBE SEIZURE: ICD-10-CM

## 2023-04-19 DIAGNOSIS — Z79.4 TYPE 2 DIABETES MELLITUS WITH HYPERGLYCEMIA, WITH LONG-TERM CURRENT USE OF INSULIN: ICD-10-CM

## 2023-04-19 PROBLEM — R51.9 HEADACHE: Status: ACTIVE | Noted: 2023-04-19

## 2023-04-19 LAB
ALBUMIN SERPL BCP-MCNC: 3.8 G/DL (ref 3.5–5.2)
ALP SERPL-CCNC: 111 U/L (ref 55–135)
ALT SERPL W/O P-5'-P-CCNC: 39 U/L (ref 10–44)
ANION GAP SERPL CALC-SCNC: 11 MMOL/L (ref 8–16)
APTT PPP: 35 SEC (ref 21–32)
ASCENDING AORTA: 3.25 CM
AST SERPL-CCNC: 37 U/L (ref 10–40)
BASOPHILS # BLD AUTO: 0.03 K/UL (ref 0–0.2)
BASOPHILS NFR BLD: 0.3 % (ref 0–1.9)
BILIRUB DIRECT SERPL-MCNC: 0.2 MG/DL (ref 0.1–0.3)
BILIRUB SERPL-MCNC: 0.5 MG/DL (ref 0.1–1)
BSA FOR ECHO PROCEDURE: 2.32 M2
BUN SERPL-MCNC: 7 MG/DL (ref 6–20)
CALCIUM SERPL-MCNC: 9.5 MG/DL (ref 8.7–10.5)
CHLORIDE SERPL-SCNC: 104 MMOL/L (ref 95–110)
CO2 SERPL-SCNC: 23 MMOL/L (ref 23–29)
CREAT SERPL-MCNC: 1.3 MG/DL (ref 0.5–1.4)
CRP SERPL-MCNC: 0.9 MG/L (ref 0–8.2)
CV ECHO LV RWT: 0.17 CM
DIFFERENTIAL METHOD: ABNORMAL
DOP CALC LVOT AREA: 4.4 CM2
DOP CALC LVOT DIAMETER: 2.37 CM
DOP CALC MV VTI: 13.61 CM
E WAVE DECELERATION TIME: 167.32 MSEC
E/A RATIO: 1.03
ECHO LV POSTERIOR WALL: 0.62 CM (ref 0.6–1.1)
EJECTION FRACTION: 10 %
EOSINOPHIL # BLD AUTO: 0.1 K/UL (ref 0–0.5)
EOSINOPHIL NFR BLD: 0.8 % (ref 0–8)
ERYTHROCYTE [DISTWIDTH] IN BLOOD BY AUTOMATED COUNT: 17.8 % (ref 11.5–14.5)
EST. GFR  (NO RACE VARIABLE): >60 ML/MIN/1.73 M^2
FRACTIONAL SHORTENING: 5 % (ref 28–44)
GLUCOSE SERPL-MCNC: 163 MG/DL (ref 70–110)
HCT VFR BLD AUTO: 37.4 % (ref 40–54)
HGB BLD-MCNC: 11.6 G/DL (ref 14–18)
IMM GRANULOCYTES # BLD AUTO: 0.02 K/UL (ref 0–0.04)
IMM GRANULOCYTES NFR BLD AUTO: 0.2 % (ref 0–0.5)
INR PPP: 2.4 (ref 0.8–1.2)
INTERVENTRICULAR SEPTUM: 0.67 CM (ref 0.6–1.1)
LA MAJOR: 5.78 CM
LA WIDTH: 5.74 CM
LDH SERPL L TO P-CCNC: 278 U/L (ref 110–260)
LEFT ATRIUM SIZE: 3.66 CM
LEFT INTERNAL DIMENSION IN SYSTOLE: 6.92 CM (ref 2.1–4)
LEFT VENTRICLE DIASTOLIC VOLUME INDEX: 120.2 ML/M2
LEFT VENTRICLE DIASTOLIC VOLUME: 276.46 ML
LEFT VENTRICLE MASS INDEX: 88 G/M2
LEFT VENTRICLE SYSTOLIC VOLUME INDEX: 108.2 ML/M2
LEFT VENTRICLE SYSTOLIC VOLUME: 248.91 ML
LEFT VENTRICULAR INTERNAL DIMENSION IN DIASTOLE: 7.25 CM (ref 3.5–6)
LEFT VENTRICULAR MASS: 201.74 G
LYMPHOCYTES # BLD AUTO: 2.3 K/UL (ref 1–4.8)
LYMPHOCYTES NFR BLD: 26 % (ref 18–48)
MAGNESIUM SERPL-MCNC: 1.6 MG/DL (ref 1.6–2.6)
MCH RBC QN AUTO: 24.7 PG (ref 27–31)
MCHC RBC AUTO-ENTMCNC: 31 G/DL (ref 32–36)
MCV RBC AUTO: 80 FL (ref 82–98)
MONOCYTES # BLD AUTO: 0.6 K/UL (ref 0.3–1)
MONOCYTES NFR BLD: 6.6 % (ref 4–15)
MV MEAN GRADIENT: 1 MMHG
MV PEAK A VEL: 0.88 M/S
MV PEAK E VEL: 0.91 M/S
MV PEAK GRADIENT: 4 MMHG
MV STENOSIS PRESSURE HALF TIME: 43.5 MS
MV VALVE AREA P 1/2 METHOD: 5.06 CM2
NEUTROPHILS # BLD AUTO: 5.8 K/UL (ref 1.8–7.7)
NEUTROPHILS NFR BLD: 66.1 % (ref 38–73)
NRBC BLD-RTO: 0 /100 WBC
PHOSPHATE SERPL-MCNC: 3.5 MG/DL (ref 2.7–4.5)
PISA TR MAX VEL: 2.71 M/S
PLATELET # BLD AUTO: 241 K/UL (ref 150–450)
PMV BLD AUTO: 10.1 FL (ref 9.2–12.9)
POCT GLUCOSE: 121 MG/DL (ref 70–110)
POCT GLUCOSE: 141 MG/DL (ref 70–110)
POTASSIUM SERPL-SCNC: 3.9 MMOL/L (ref 3.5–5.1)
PREALB SERPL-MCNC: 22 MG/DL (ref 20–43)
PROT SERPL-MCNC: 7.8 G/DL (ref 6–8.4)
PROTHROMBIN TIME: 23.8 SEC (ref 9–12.5)
PULM VEIN S/D RATIO: 0.94
PV PEAK D VEL: 0.35 M/S
PV PEAK S VEL: 0.33 M/S
RA MAJOR: 5.18 CM
RA PRESSURE: 15 MMHG
RA WIDTH: 5.58 CM
RBC # BLD AUTO: 4.7 M/UL (ref 4.6–6.2)
RIGHT VENTRICULAR END-DIASTOLIC DIMENSION: 6.82 CM
RV TISSUE DOPPLER FREE WALL SYSTOLIC VELOCITY 1 (APICAL 4 CHAMBER VIEW): 4.54 CM/S
SINUS: 3.02 CM
SODIUM SERPL-SCNC: 138 MMOL/L (ref 136–145)
STJ: 2.43 CM
TR MAX PG: 29 MMHG
TRICUSPID ANNULAR PLANE SYSTOLIC EXCURSION: 1.16 CM
TSH SERPL DL<=0.005 MIU/L-ACNC: 3.33 UIU/ML (ref 0.4–4)
TV REST PULMONARY ARTERY PRESSURE: 44 MMHG
WBC # BLD AUTO: 8.73 K/UL (ref 3.9–12.7)

## 2023-04-19 PROCEDURE — 99215 PR OFFICE/OUTPT VISIT, EST, LEVL V, 40-54 MIN: ICD-10-PCS | Mod: S$PBB,,, | Performed by: INTERNAL MEDICINE

## 2023-04-19 PROCEDURE — 93750 INTERROGATION VAD IN PERSON: CPT | Mod: PBBFAC | Performed by: INTERNAL MEDICINE

## 2023-04-19 PROCEDURE — 3008F PR BODY MASS INDEX (BMI) DOCUMENTED: ICD-10-PCS | Mod: CPTII,,, | Performed by: INTERNAL MEDICINE

## 2023-04-19 PROCEDURE — 83615 LACTATE (LD) (LDH) ENZYME: CPT | Performed by: INTERNAL MEDICINE

## 2023-04-19 PROCEDURE — 1159F MED LIST DOCD IN RCRD: CPT | Mod: CPTII,,, | Performed by: INTERNAL MEDICINE

## 2023-04-19 PROCEDURE — 4010F PR ACE/ARB THEARPY RXD/TAKEN: ICD-10-PCS | Mod: CPTII,,, | Performed by: INTERNAL MEDICINE

## 2023-04-19 PROCEDURE — 97166 OT EVAL MOD COMPLEX 45 MIN: CPT

## 2023-04-19 PROCEDURE — 85025 COMPLETE CBC W/AUTO DIFF WBC: CPT | Performed by: INTERNAL MEDICINE

## 2023-04-19 PROCEDURE — 97161 PT EVAL LOW COMPLEX 20 MIN: CPT

## 2023-04-19 PROCEDURE — 25000003 PHARM REV CODE 250: Performed by: INTERNAL MEDICINE

## 2023-04-19 PROCEDURE — 3051F HG A1C>EQUAL 7.0%<8.0%: CPT | Mod: CPTII,,, | Performed by: INTERNAL MEDICINE

## 2023-04-19 PROCEDURE — 85610 PROTHROMBIN TIME: CPT | Performed by: INTERNAL MEDICINE

## 2023-04-19 PROCEDURE — 4010F ACE/ARB THERAPY RXD/TAKEN: CPT | Mod: CPTII,,, | Performed by: INTERNAL MEDICINE

## 2023-04-19 PROCEDURE — 80048 BASIC METABOLIC PNL TOTAL CA: CPT | Mod: XB | Performed by: INTERNAL MEDICINE

## 2023-04-19 PROCEDURE — 93750 PR INTERROGATE VENT ASSIST DEV, IN PERSON, W PHYSICIAN ANALYSIS: ICD-10-PCS | Mod: S$PBB,,, | Performed by: INTERNAL MEDICINE

## 2023-04-19 PROCEDURE — 93306 TTE W/DOPPLER COMPLETE: CPT | Mod: 26,,, | Performed by: INTERNAL MEDICINE

## 2023-04-19 PROCEDURE — 93306 ECHO (CUPID ONLY): ICD-10-PCS | Mod: 26,,, | Performed by: INTERNAL MEDICINE

## 2023-04-19 PROCEDURE — 1111F DSCHRG MED/CURRENT MED MERGE: CPT | Mod: CPTII,,, | Performed by: INTERNAL MEDICINE

## 2023-04-19 PROCEDURE — 99215 OFFICE O/P EST HI 40 MIN: CPT | Mod: S$PBB,,, | Performed by: INTERNAL MEDICINE

## 2023-04-19 PROCEDURE — 99999 PR PBB SHADOW E&M-EST. PATIENT-LVL IV: ICD-10-PCS | Mod: PBBFAC,,, | Performed by: INTERNAL MEDICINE

## 2023-04-19 PROCEDURE — 3008F BODY MASS INDEX DOCD: CPT | Mod: CPTII,,, | Performed by: INTERNAL MEDICINE

## 2023-04-19 PROCEDURE — 99214 OFFICE O/P EST MOD 30 MIN: CPT | Mod: PBBFAC,27 | Performed by: INTERNAL MEDICINE

## 2023-04-19 PROCEDURE — 1159F PR MEDICATION LIST DOCUMENTED IN MEDICAL RECORD: ICD-10-PCS | Mod: CPTII,,, | Performed by: INTERNAL MEDICINE

## 2023-04-19 PROCEDURE — 85730 THROMBOPLASTIN TIME PARTIAL: CPT | Performed by: INTERNAL MEDICINE

## 2023-04-19 PROCEDURE — 1111F PR DISCHARGE MEDS RECONCILED W/ CURRENT OUTPATIENT MED LIST: ICD-10-PCS | Mod: CPTII,,, | Performed by: INTERNAL MEDICINE

## 2023-04-19 PROCEDURE — 99999 PR PBB SHADOW E&M-EST. PATIENT-LVL IV: CPT | Mod: PBBFAC,,, | Performed by: INTERNAL MEDICINE

## 2023-04-19 PROCEDURE — 84100 ASSAY OF PHOSPHORUS: CPT | Performed by: INTERNAL MEDICINE

## 2023-04-19 PROCEDURE — 80076 HEPATIC FUNCTION PANEL: CPT | Performed by: INTERNAL MEDICINE

## 2023-04-19 PROCEDURE — 84134 ASSAY OF PREALBUMIN: CPT | Performed by: INTERNAL MEDICINE

## 2023-04-19 PROCEDURE — 93306 TTE W/DOPPLER COMPLETE: CPT

## 2023-04-19 PROCEDURE — 83735 ASSAY OF MAGNESIUM: CPT | Performed by: INTERNAL MEDICINE

## 2023-04-19 PROCEDURE — 3051F PR MOST RECENT HEMOGLOBIN A1C LEVEL 7.0 - < 8.0%: ICD-10-PCS | Mod: CPTII,,, | Performed by: INTERNAL MEDICINE

## 2023-04-19 PROCEDURE — 86140 C-REACTIVE PROTEIN: CPT | Performed by: INTERNAL MEDICINE

## 2023-04-19 PROCEDURE — G0378 HOSPITAL OBSERVATION PER HR: HCPCS

## 2023-04-19 PROCEDURE — 93750 INTERROGATION VAD IN PERSON: CPT | Mod: S$PBB,,, | Performed by: INTERNAL MEDICINE

## 2023-04-19 RX ORDER — MUPIROCIN 20 MG/G
OINTMENT TOPICAL 2 TIMES DAILY
Status: DISCONTINUED | OUTPATIENT
Start: 2023-04-19 | End: 2023-04-19 | Stop reason: HOSPADM

## 2023-04-19 RX ORDER — VALSARTAN 40 MG/1
40 TABLET ORAL 2 TIMES DAILY
Qty: 180 TABLET | Refills: 3 | Status: ON HOLD | OUTPATIENT
Start: 2023-04-19 | End: 2023-11-15 | Stop reason: HOSPADM

## 2023-04-19 RX ORDER — VALSARTAN 80 MG/1
80 TABLET ORAL 2 TIMES DAILY
Status: DISCONTINUED | OUTPATIENT
Start: 2023-04-19 | End: 2023-04-19 | Stop reason: HOSPADM

## 2023-04-19 RX ADMIN — VALSARTAN 40 MG: 40 TABLET, FILM COATED ORAL at 02:04

## 2023-04-19 RX ADMIN — FAMOTIDINE 40 MG: 20 TABLET, FILM COATED ORAL at 08:04

## 2023-04-19 RX ADMIN — POTASSIUM CHLORIDE 40 MEQ: 1500 TABLET, EXTENDED RELEASE ORAL at 08:04

## 2023-04-19 RX ADMIN — VALSARTAN 40 MG: 40 TABLET, FILM COATED ORAL at 08:04

## 2023-04-19 RX ADMIN — BUSPIRONE HYDROCHLORIDE 7.5 MG: 5 TABLET ORAL at 08:04

## 2023-04-19 RX ADMIN — GABAPENTIN 100 MG: 100 CAPSULE ORAL at 08:04

## 2023-04-19 RX ADMIN — LEVETIRACETAM 1000 MG: 500 TABLET, FILM COATED ORAL at 08:04

## 2023-04-19 RX ADMIN — SPIRONOLACTONE 25 MG: 25 TABLET, FILM COATED ORAL at 08:04

## 2023-04-19 NOTE — ASSESSMENT & PLAN NOTE
- Supported with LVAD as below  - Hypertensive with MAPs 90 with mild congestion on admission  - s/p IV lasix 40 mg with good response  - Valsartan 40 mg BID started, which can be upitrated in clinic  - Recommend lasix 80 mg in am and 40 mg in pm for 3 days, then back to 40 mg BID  - Continue spirinolactone 25 mg (can consider to change to eplerenone as he reports mild breast enlargement )  - Continue potassium supplementation  - Continue milrinone as below  - DC today with close follow up in clinic

## 2023-04-19 NOTE — PLAN OF CARE
Problem: Physical Therapy  Goal: Physical Therapy Goal  Outcome: Met     PT evaluation complete and no goals established. Pt is functioning at high level and does not required acute care physical therapy services. Education provided to ambulate in hallway 3x/day with independence in order to maintain strength and endurance. Pt verbalized understanding. Please re-consult if functional mobility changes. Anticipate d/c to home; no needs.

## 2023-04-19 NOTE — ED NOTES
LVAd  [id number]  [id number]  [facility]  [id number]  [id number]  [id number]  [id number]  Settings  5100  4.0  6.5  3.7    Milrinone 0.25mcg/kg/min

## 2023-04-19 NOTE — ED PROVIDER NOTES
Encounter Date: 4/18/2023       History     Chief Complaint   Patient presents with    LVAD     Reports intermittent left sided CP x1 week and pain to PICC insertion site.      HPI  37-year-old male with heart failure reduced ejection fraction status post LVAD HeartMate 3 implantation 06/23/2022 with early RV failure requiring are bad on home Milrinone 0.25mcg/kg/min.  Patient also reports history of syncope versus seizure and is on Keppra.  Patient was recently discharged   after admission for acute decompensated heart failure on for 2 after his Milrinone drip was discontinued at home he was diuresed with IV Lasix and did well.  Patient was discharged home on 04/06.  Patient states that for the past week he has been having severe intermittent left-sided chest pain along with some right arm pain that he describes as a severe burning pain that brings tears to his eyes.  Patient states pain lasts for 3 minutes at a time.  Patient is also complaining of a headache with some bilateral eye pain.  Patient states that he took Tylenol which improved the left eye pain but not as right eye pain.  Patient also noting some intermittent blurry vision and some double vision.  States that his sugar has not been well controlled as well.  Patient states he does not feel fluid overloaded.  Patient also noting that he gets cramps that are like Charley horses but thinks this is different than that.  Denies any recent falls or trauma to his chest or back.  Denies any rashes.  Denies fevers or chills, nausea vomiting, abdominal pain.  Patient notes his pain episodes are associated with diaphoresis but not shortness of breath, lightheadedness or syncope.  Review of patient's allergies indicates:   Allergen Reactions    Aspirin Other (See Comments)     Mr. Thacker is enrolled in Dr. Paige's Alon Trial and cannot have any aspirin and/or aspirin-containing products. DO NOT cancel any orders for INV Aspirin 100 mg/Placebo. If you have  questions, please contact Isabel @ [phone], [phone],[email or web address] Authentication, secure chat or MS Teams message.    Bumex [bumetanide] Hives    Lactose Diarrhea     Other reaction(s): Abdominal distension, gaseous    Torsemide Hives     Past Medical History:   Diagnosis Date    Arthritis     Cardiomyopathy     CHF (congestive heart failure) 10/01/2020    Diabetes mellitus     Dilated cardiomyopathy 10/26/2020    Drug abuse 10/2020    Hyperosmolar hyperglycemic state (HHS) 5/25/2022    ICD (implantable cardioverter-defibrillator) in place 10/26/2020    Muscle cramping 6/15/2022    Renal disorder      Past Surgical History:   Procedure Laterality Date    APPLICATION OF WOUND VACUUM-ASSISTED CLOSURE DEVICE N/A 6/30/2022    Procedure: APPLICATION, WOUND VAC;  Surgeon: Luis F Paige MD;  Location: [facility] OR [address] [place] [address];  Service: Cardiovascular;  Laterality: N/A;  50 x 5 cm    CARDIAC DEFIBRILLATOR PLACEMENT      IMPLANTATION OF RIGHT VENTRICULAR ASSIST DEVICE (RVAD) N/A 6/29/2022    Procedure: INSERTION, RVAD;  Surgeon: Luis F Paige MD;  [facility];  Service: Cardiovascular;  Laterality: N/A;    INSERTION OF GRAFT TO PERICARDIUM Right 6/30/2022    Procedure: INSERTION-RIGHT VENTRICULAR ASSIST DEVICE;  Surgeon: Luis F Piage MD;  [facility];  Service: Cardiovascular;  Laterality: Right;    IRRIGATION OF MEDIASTINUM  6/30/2022    Procedure: IRRIGATION, MEDIASTINUM;  Surgeon: Luis F Paige MD;  [facility];  Service: Cardiovascular;;    LEFT VENTRICULAR ASSIST DEVICE Left 6/23/2022    Procedure: INSERTION-LEFT VENTRICULAR ASSIST DEVICE;  Surgeon: Luis F Paige MD;  [facility];  Service: Cardiovascular;  Laterality: Left;    LEFT VENTRICULAR ASSIST DEVICE N/A 6/29/2022    Procedure: INSERTION-LEFT VENTRICULAR ASSIST DEVICE;  Surgeon: Luis F Paige MD;  [facility];  Service: Cardiovascular;  Laterality: N/A;    RIGHT HEART CATHETERIZATION Right  2022    Procedure: INSERTION, CATHETER, RIGHT HEART;  Surgeon: Luca Lopez Jr., MD;  Location: [facility] CATH LAB;  Service: Cardiology;  Laterality: Right;    RIGHT HEART CATHETERIZATION Right 2022    Procedure: INSERTION, CATHETER, RIGHT HEART;  Surgeon: Josh Pulido MD;  Location: [facility] CATH LAB;  Service: Cardiology;  Laterality: Right;    RIGHT HEART CATHETERIZATION Right 2022    Procedure: INSERTION, CATHETER, RIGHT HEART;  Surgeon: Dalia Crum MD;  Location: [facility] CATH LAB;  Service: Cardiology;  Laterality: Right;    RIGHT HEART CATHETERIZATION Right 10/31/2022    Procedure: INSERTION, CATHETER, RIGHT HEART;  Surgeon: Dalia Crum MD;  Location: [facility] CATH LAB;  Service: Cardiology;  Laterality: Right;    STERNAL WOUND CLOSURE N/A 2022    Procedure: CLOSURE, WOUND, STERNUM;  Surgeon: Luis F Paige MD;  [facility];  Service: Cardiovascular;  Laterality: N/A;     Family History   Problem Relation Age of Onset    Heart failure Father     Diverticulosis Brother     Heart attack Maternal Grandmother     Heart attack Maternal Grandfather      Social History     Tobacco Use    Smoking status: Former     Packs/day: 0.50     Years: 16.00     Pack years: 8.00     Types: Cigarettes     Start date: 10/1/2004     Quit date: 2021     Years since quittin.9    Smokeless tobacco: Never   Substance Use Topics    Alcohol use: Not Currently    Drug use: Not Currently     Types: Marijuana, MDMA (Ecstacy)     Review of Systems   Constitutional:  Negative for chills, fatigue and fever.   HENT:  Negative for congestion and sore throat.    Eyes:  Positive for pain and visual disturbance.   Respiratory:  Negative for cough, shortness of breath and wheezing.    Cardiovascular:  Positive for chest pain. Negative for palpitations.   Gastrointestinal:  Negative for abdominal pain, nausea and vomiting.   Genitourinary:  Negative for dysuria.   Musculoskeletal:  Negative for back  pain.   Skin:  Negative for rash.   Neurological:  Positive for headaches. Negative for weakness.   Hematological:  Does not bruise/bleed easily.     Physical Exam     Initial Vitals   BP Pulse Resp Temp SpO2   04/18/23 2152 04/18/23 2152 04/18/23 2102 04/18/23 2102 04/18/23 2102   120/89 100 17 98.4 °F (36.9 °C) 95 %      MAP       --                Physical Exam    Nursing note and vitals reviewed.  Constitutional:   Patient is sitting up in bed.  Does not appear in any respiratory distress.   HENT:   Head: Normocephalic and atraumatic.   Mouth/Throat: Oropharynx is clear and moist.   Eyes: EOM are normal. Pupils are equal, round, and reactive to light.   Neck: Neck supple.   Normal range of motion.  Cardiovascular:            Mechanical sounds appreciated.   Pulmonary/Chest: Breath sounds normal.   Able to hear posterior breath sounds that appear normal.  No rhonchi appreciated.   Abdominal: Abdomen is soft. He exhibits no distension. There is no abdominal tenderness.   Tender to palpation over bilateral lower breast areas.  No cellulitis, warmth or asymmetric swelling There is no rebound and no guarding.   Musculoskeletal:         General: No tenderness or edema. Normal range of motion.      Cervical back: Normal range of motion and neck supple.     Neurological: He is alert and oriented to person, place, and time. He has normal strength. No cranial nerve deficit.   Skin: Skin is warm. No erythema.       ED Course   Procedures  Labs Reviewed   CBC W/ AUTO DIFFERENTIAL - Abnormal; Notable for the following components:       Result Value    Hemoglobin 11.7 (*)     Hematocrit 37.9 (*)     MCH 25.3 (*)     MCHC 30.9 (*)     RDW 18.2 (*)     All other components within normal limits   COMPREHENSIVE METABOLIC PANEL - Abnormal; Notable for the following components:    CO2 20 (*)     Glucose 138 (*)     AST 42 (*)     All other components within normal limits   PROTIME-INR - Abnormal; Notable for the following  components:    Prothrombin Time 26.1 (*)     INR 2.6 (*)     All other components within normal limits   TROPONIN I - Abnormal; Notable for the following components:    Troponin I 0.147 (*)     All other components within normal limits   B-TYPE NATRIURETIC PEPTIDE - Abnormal; Notable for the following components:     (*)     All other components within normal limits   LACTATE DEHYDROGENASE - Abnormal; Notable for the following components:     (*)     All other components within normal limits   MAGNESIUM   TSH   POCT GLUCOSE MONITORING CONTINUOUS          Imaging Results              X-Ray Chest 1 View (Final result)  Result time 04/18/23 22:13:25      Final result by Ricardo Alvarez MD (04/18/23 22:13:25)                   Impression:      No significant change from prior study.      Electronically signed by: Ricardo Alvarez MD  Date:    04/18/2023  Time:    22:13               Narrative:    EXAMINATION:  XR CHEST 1 VIEW    CLINICAL HISTORY:  Presence of heart assist device    TECHNIQUE:  Single frontal view of the chest was performed.    COMPARISON:  03/08/2023.    FINDINGS:  Cardiomegaly with LVAD, AICD and sternotomy wires, similar to prior.  Right PICC line appears unchanged.    Heart and lungs  appear unchanged when allowing for differences in technique and positioning.                                       Medications   milrinone 20mg in D5W 100 mL infusion (has no administration in time range)   furosemide injection 40 mg (has no administration in time range)   valsartan tablet 40 mg (has no administration in time range)   busPIRone split tablet 7.5 mg (has no administration in time range)   famotidine tablet 40 mg (has no administration in time range)   gabapentin capsule 100 mg (has no administration in time range)   insulin detemir U-100 (Levemir) pen 22 Units (has no administration in time range)   levETIRAcetam tablet 1,000 mg (has no administration in time range)   potassium chloride SA  CR tablet 40 mEq (has no administration in time range)   spironolactone tablet 25 mg (has no administration in time range)   warfarin split tablet 4.5 mg (has no administration in time range)   warfarin tablet 6 mg (has no administration in time range)     Medical Decision Making:   Initial Assessment:   38-year-old male with LVAD on Milrinone presenting to the ED today for recurrent episodes of left-sided chest pain and right arm pain over the last week.  Patient also complaining of headache and eye pain.  Vital signs notable for normal respiratory rate satting 95%.  Blood pressure is pending.  Patient is sitting up in the exam room and does not appear to be in acute distress at this time.  Lungs without rhonchi.  Lower extremity without swelling.  Will assess with full cardiac workup.  We will also obtain CT head to evaluate for acute bleed.  Will discuss with LVAD coordinator and [facility]    Shefali Andrade MD  Emergency Medicine Staff   Critical Care Fellow  9:47 PM             ED Course as of 04/18/23 2327   e Apr 18, 2023 2326 Labs notable for elevated troponin though within normal limits per patient.  BNP also elevated but lower than prior on admission.  CBC and CMP are within normal limits aside from mild metabolic acidosis.  No anion gap.  Patient to be admitted to [facility] for continued evaluation.  CT head pending still.    Patient care assumed by oncoming ED team.    Shefali Andrade MD  Emergency Medicine Staff   Critical Care Fellow  11:27 PM   [AE]      ED Course User Index  [AE] Shefali Andrade MD                 Clinical Impression:   Final diagnoses:  [R07.9] Chest pain  [Z95.811] LVAD (left ventricular assist device) present               Shefali Andrade MD  04/18/23 2327

## 2023-04-19 NOTE — PT/OT/SLP EVAL
"Occupational Therapy   Evaluation and Discharge Note    Name: Kevan Queen  MRN: [id number]  Admitting Diagnosis: <principal problem not specified>  Recent Surgery: * No surgery found *      Recommendations:     Discharge Recommendations: home  Discharge Equipment Recommendations: none  Barriers to discharge:  None    Assessment:     Kevan Queen is a 38 y.o. male with a medical diagnosis of <principal problem not specified>. At this time, patient is functioning at their prior level of function and does not require further acute OT services.     Plan:     During this hospitalization, patient does not require further acute OT services.  Please re-consult if situation changes.    Plan of Care Reviewed with: patient    Subjective     Chief Complaint: "i'm good"  Patient/Family Comments/goals: to be discharged and go to his doctor appointments    Occupational Profile:  Living Environment: Pt and his family live in a 2 SH with his bed and bathroom on the second floor.  Previous level of function: independent with ADLs, mobility and LVAD management  Equipment Used at home: none  Assistance upon Discharge: Upon discharge, pt will have assist from family    Pain/Comfort:  Pain Rating 1: 0/10  Pain Rating Post-Intervention 1: 0/10    Patients cultural, spiritual, Mandaen conflicts given the current situation: no    Objective:     Communicated with: RN and PT prior to session.  Patient found sitting edge of bed with peripheral IV, LVAD, telemetry, pulse ox (continuous), blood pressure cuff upon OT entry to room.    General Precautions: Standard, fall, LVAD  Orthopedic Precautions: N/A  Braces: N/A  Respiratory Status: Room air     Occupational Performance:    Functional Mobility/Transfers:  Patient completed Sit <> Stand Transfer with independence  with  no assistive device   Functional Mobility: Pt ambulated short distance in room with (I) in order to maximize functional activity tolerance and standing balance required " for engagement in occupations of choice.      Activities of Daily Living:  Reports no concerns    Cognitive/Visual Perceptual:  Cognitive/Psychosocial Skills:     -       Oriented to: Person, Place, Time, and Situation   -       Follows Commands/attention:Follows multistep  commands  -       Communication: clear/fluent  -       Memory: No Deficits noted  -       Safety awareness/insight to disability: intact   -       Mood/Affect/Coping skills/emotional control: Appropriate to situation    Physical Exam:  Upper Extremity Range of Motion:     -       Right Upper Extremity: WFL  -       Left Upper Extremity: WFL  Upper Extremity Strength:    -       Right Upper Extremity: WFL  -       Left Upper Extremity: WFL   Strength:    -       Right Upper Extremity: WFL  -       Left Upper Extremity: WFL  Fine Motor Coordination:    -       Intact    AMPAC 6 Click ADL:  AMPAC Total Score: 24    Treatment & Education:  -Therapist provided facilitation and instruction of proper body mechanics, energy conservation, and fall prevention strategies during tasks listed above.  -Pt educated on role of OT and D/C from acute OT  -Pt educated on importance of OOB activities with staff member assistance and sitting OOB majority of the day.   -Pt verbalized understanding. Pt expressed no further concerns/questions  -Whiteboard updated     Patient left sitting edge of bed with all lines intact and call button in reach    GOALS:   Multidisciplinary Problems       Occupational Therapy Goals       Not on file                    History:     Past Medical History:   Diagnosis Date    Arthritis     Cardiomyopathy     CHF (congestive heart failure) 10/01/2020    Diabetes mellitus     Dilated cardiomyopathy 10/26/2020    Drug abuse 10/2020    Hyperosmolar hyperglycemic state (HHS) 5/25/2022    ICD (implantable cardioverter-defibrillator) in place 10/26/2020    Muscle cramping 6/15/2022    Renal disorder          Past Surgical History:   Procedure  Laterality Date    APPLICATION OF WOUND VACUUM-ASSISTED CLOSURE DEVICE N/A 6/30/2022    Procedure: APPLICATION, WOUND VAC;  Surgeon: Luis F Paige MD;  Location: [facility] OR 2ND FLR;  Service: Cardiovascular;  Laterality: N/A;  50 x 5 cm    CARDIAC DEFIBRILLATOR PLACEMENT      IMPLANTATION OF RIGHT VENTRICULAR ASSIST DEVICE (RVAD) N/A 6/29/2022    Procedure: INSERTION, RVAD;  Surgeon: Luis F Paige MD;  Location: [facility] OR 2ND FLR;  Service: Cardiovascular;  Laterality: N/A;    INSERTION OF GRAFT TO PERICARDIUM Right 6/30/2022    Procedure: INSERTION-RIGHT VENTRICULAR ASSIST DEVICE;  Surgeon: Luis F Paige MD;  Location: [facility] OR 2ND FLR;  Service: Cardiovascular;  Laterality: Right;    IRRIGATION OF MEDIASTINUM  6/30/2022    Procedure: IRRIGATION, MEDIASTINUM;  Surgeon: Luis F Paige MD;  Location: [facility] OR 2ND FLR;  Service: Cardiovascular;;    LEFT VENTRICULAR ASSIST DEVICE Left 6/23/2022    Procedure: INSERTION-LEFT VENTRICULAR ASSIST DEVICE;  Surgeon: Luis F Paige MD;  Location: [facility] OR 2ND FLR;  Service: Cardiovascular;  Laterality: Left;    LEFT VENTRICULAR ASSIST DEVICE N/A 6/29/2022    Procedure: INSERTION-LEFT VENTRICULAR ASSIST DEVICE;  Surgeon: Luis F Paige MD;  Location: [facility] OR 2ND FLR;  Service: Cardiovascular;  Laterality: N/A;    RIGHT HEART CATHETERIZATION Right 4/8/2022    Procedure: INSERTION, CATHETER, RIGHT HEART;  Surgeon: Luca Lopez Jr., MD;  Location: [facility] CATH LAB;  Service: Cardiology;  Laterality: Right;    RIGHT HEART CATHETERIZATION Right 4/19/2022    Procedure: INSERTION, CATHETER, RIGHT HEART;  Surgeon: Josh Pulido MD;  Location: [facility] CATH LAB;  Service: Cardiology;  Laterality: Right;    RIGHT HEART CATHETERIZATION Right 7/21/2022    Procedure: INSERTION, CATHETER, RIGHT HEART;  Surgeon: Dalia Crum MD;  Location: [facility] CATH LAB;  Service: Cardiology;  Laterality: Right;    RIGHT HEART CATHETERIZATION Right 10/31/2022    Procedure: INSERTION, CATHETER, RIGHT  HEART;  Surgeon: Dalia Crum MD;  Location: [facility] CATH LAB;  Service: Cardiology;  Laterality: Right;    STERNAL WOUND CLOSURE N/A 6/30/2022    Procedure: CLOSURE, WOUND, STERNUM;  Surgeon: Luis F Paige MD;  Location: [facility] OR [address] [place] [address];  Service: Cardiovascular;  Laterality: N/A;       Time Tracking:     OT Date of Treatment: 04/19/23  OT Start Time: 0857  OT Stop Time: 0904  OT Total Time (min): 7 min    Billable Minutes:Evaluation 7    4/19/2023

## 2023-04-19 NOTE — PT/OT/SLP EVAL
"Physical Therapy Evaluation and Discharge Note    Patient Name:  Kevan Queen   MRN:  [id number]  Admitting Diagnosis:  Acute on chronic combined systolic and diastolic heart failure   Recent Surgery: * No surgery found *    Admit Date: 4/18/2023  Length of Stay: 1 days    Recommendations:     Discharge Recommendations:  home   Discharge Equipment Recommendations: none   Barriers to discharge: None    Assessment:     Kevan Queen is a 38 y.o. male admitted with a medical diagnosis of Acute on chronic combined systolic and diastolic heart failure.PT orders received and acknowledged. Pt with LVAD implantation on 6/29/2022. Pt currently admitted for upper torso and chest pain. Pt reports he is at baseline mobility, independent with all mobility and ADLs. Pt reports he has been ambulating independently, denies any dizziness, SOB, or recent falls. Additionally, upon PT evaluation patient is functioning at a high level with no safety concerns at this time. Pt reports independence with VAD management with no concerns.  Pt with good understanding of PT role in acute care setting and denies need for acute skilled therapy intervention. PT provided education to pt regarding importance of maintaining frequent activity and ambulating while admitted to maintain independent level of mobility. Pt verbalized understanding. At this time, pt does not require further acute skilled therapy intervention. Discharge from PT services and re-consult if pt experiences a change in status.         GOALS: No goals identified at [facility].    Plan:     During this hospitalization, patient does not require further acute PT services.  Please re-consult if situation changes.      Subjective     RN  notified prior to session. No family/friends present upon PT entrance into room. Patient agreeable to PT evaluation.    Chief Complaint: "I wasn't feeling good so the team wanted me to come in"  Patient/Family Comments/goals: go home  Pain/Comfort:  Pain " Rating 1: 0/10  Pain Rating Post-Intervention 1: 0/10    Social History:  Residence: lives with their family 2-story house with 0 MILY and no HR. Pt's bed/bath on 2nd floor.  Support available: family  Equipment Used: none  Equipment Owned (not using): None  Prior level of function: independent  Hand Dominance: right.   Drive: yes.   Managing Medicines/Managing Home: yes    Patient reports they will have assistance from family upon discharge.    Objective:     Additional staff present:  OT; OT for co-evaluation due to suspected patient need for two skilled therapists to appropriately assess patient's functional deficits as well as ensure patient safety, accommodate for limited activity tolerance, and provide appropriate, skilled assistance to maximize functional potential during evaluation.    Patient found sitting edge of bed with telemetry, blood pressure cuff, pulse ox (continuous), LVAD, peripheral IV upon PT entry to room.    General Precautions: Standard, fall, LVAD   Orthopedic Precautions:N/A   Braces: N/A   Body mass index is 28 kg/m².  Oxygen Device: Room Air  Vitals: BP (!) 96/0   Pulse 101   Temp 97.9 °F (36.6 °C)   Resp 20   Wt 101.6 kg (224 lb)   SpO2 97%   BMI 28.00 kg/m²     Exam:  Cognition:   Alert and Cooperative  AxOx4  Command following: Follows multistep verbal commands  Fluency: clear/fluent  Skin Integrity: Visible skin intact  Edema: None noted \Sensation: Intact  Hearing: Intact  Vision:  Intact  Postural Assessment: no deviations noted  Coordination: grossly WNL  Range of Motion:  RUE: WNL  LUE: WNL  RLE: WNL  LLE: WNL  Strength Exam:  Bilateral Upper Extremity Strength: grossly WNL  Bilateral Lower Extremity Strength: grossly WNL    Outcome Measures:  AM-PAC 6 CLICK MOBILITY  Turning over in bed (including adjusting bedclothes, sheets and blankets)?: 4  Sitting down on and standing up from a chair with arms (e.g., wheelchair, bedside commode, etc.): 4  Moving from lying on back to  sitting on the side of the bed?: 4  Moving to and from a bed to a chair (including a wheelchair)?: 4  Need to walk in hospital room?: 4  Climbing 3-5 steps with a railing?: 4  Basic Mobility Total Score: 24     Functional Mobility:  Bed Mobility:   Pt found/returned to sitting EOB    Sitting Balance at Edge of Bed:  Static Sitting Balance: Normal : able to maintain balance against maximal resistance  Dynamic Sitting Balance: Normal : able to sit unsupported and weight shift across midline maximally  Assistance Level Required: [place]    Transfers:   Sit <> Stand Transfer: independence with no assistive device   Stand <> Sit Transfer: independence with no assistive device   [id number] from EOB    Standing Balance:  Static Standing Balance: Normal : able to maintain standing balance against maximal resistance  Dynamic Standing Balance: Normal : able to stand independently unsupported and to weight shift and cross midline maximally  Assistance Level Required: [place]  Patient used: no assistive device     Gait:   Patient ambulated: ~36 ft in room   Patient required: independent  Patient used:  no assistive device   Gait Pattern observed: reciprocal gait  Gait Deviation(s): no deviations noted  Impairments due to: n/a  all lines remained intact throughout ambulation trial  Comments: Pt was able to perform vertical/horizontal head turns, 180 degree turns while ambulating, and avoid  in-room obstacles without LOB.     Stairs:  Pt reporting no concerns with stairs, declined practicing    Education:  Time provided for education, counseling and discussion of health disposition in regards to patient's current status  All questions answered within PT scope of practice and to patient's satisfaction  PT role in POC to address current functional deficits  Pt educated on proper body mechanics, safety techniques, and energy conservation with PT facilitation and cueing throughout session  Whiteboard updated with therapist  name and pt's current mobility status documented above  Safe to perform step transfer to/from chair/bedside commode independence and no AD w/ nursing/PCT present  Pt to ambulate 3-4x/day independence and no AD with nsg/PCT in order to maintain functional mobility  Importance of OOB tolerance 8-10 hrs/day to improve lung ventilation and expansion as well as strengthen postural musculature  LVAD: patient found on wall power / returned on wall power. No alarms sounded.     Patient left sitting edge of bed with all lines intact, call button in reach, and RN notified.    History:     Past Medical History:   Diagnosis Date    Arthritis     Cardiomyopathy     CHF (congestive heart failure) 10/01/2020    Diabetes mellitus     Dilated cardiomyopathy 10/26/2020    Drug abuse 10/2020    Hyperosmolar hyperglycemic state (HHS) 5/25/2022    ICD (implantable cardioverter-defibrillator) in place 10/26/2020    Muscle cramping 6/15/2022    Renal disorder        Past Surgical History:   Procedure Laterality Date    APPLICATION OF WOUND VACUUM-ASSISTED CLOSURE DEVICE N/A 6/30/2022    Procedure: APPLICATION, WOUND VAC;  Surgeon: Luis F Paige MD;  Location: [facility] OR [address] [place] [address];  Service: Cardiovascular;  Laterality: N/A;  50 x 5 cm    CARDIAC DEFIBRILLATOR PLACEMENT      IMPLANTATION OF RIGHT VENTRICULAR ASSIST DEVICE (RVAD) N/A 6/29/2022    Procedure: INSERTION, RVAD;  Surgeon: Luis F Paige MD;  Location: [facility] OR [address] [place] [address];  Service: Cardiovascular;  Laterality: N/A;    INSERTION OF GRAFT TO PERICARDIUM Right 6/30/2022    Procedure: INSERTION-RIGHT VENTRICULAR ASSIST DEVICE;  Surgeon: Luis F Paige MD;  [facility];  Service: Cardiovascular;  Laterality: Right;    IRRIGATION OF MEDIASTINUM  6/30/2022    Procedure: IRRIGATION, MEDIASTINUM;  Surgeon: Luis F Paige MD;  Location: [facility] OR [address] [place] [address];  Service: Cardiovascular;;    LEFT VENTRICULAR ASSIST DEVICE Left 6/23/2022    Procedure: INSERTION-LEFT VENTRICULAR ASSIST DEVICE;   Surgeon: Luis F Paige MD;  [address];  Service: Cardiovascular;  Laterality: Left;    LEFT VENTRICULAR ASSIST DEVICE N/A 2022    Procedure: INSERTION-LEFT VENTRICULAR ASSIST DEVICE;  Surgeon: Luis F Paige MD;  [address];  Service: Cardiovascular;  Laterality: N/A;    RIGHT HEART CATHETERIZATION Right 2022    Procedure: INSERTION, CATHETER, RIGHT HEART;  Surgeon: Luca Lopez Jr., MD;  Location: [facility] CATH LAB;  Service: Cardiology;  Laterality: Right;    RIGHT HEART CATHETERIZATION Right 2022    Procedure: INSERTION, CATHETER, RIGHT HEART;  Surgeon: Josh Pulido MD;  Location: [facility] CATH LAB;  Service: Cardiology;  Laterality: Right;    RIGHT HEART CATHETERIZATION Right 2022    Procedure: INSERTION, CATHETER, RIGHT HEART;  Surgeon: Dalia Crum MD;  Location: [facility] CATH LAB;  Service: Cardiology;  Laterality: Right;    RIGHT HEART CATHETERIZATION Right 10/31/2022    Procedure: INSERTION, CATHETER, RIGHT HEART;  Surgeon: Dalia Crum MD;  Location: [facility] CATH LAB;  Service: Cardiology;  Laterality: Right;    STERNAL WOUND CLOSURE N/A 2022    Procedure: CLOSURE, WOUND, STERNUM;  Surgeon: Luis F Paige MD;  [address];  Service: Cardiovascular;  Laterality: N/A;       Family History   Problem Relation Age of Onset    Heart failure Father     Diverticulosis Brother     Heart attack Maternal Grandmother     Heart attack Maternal Grandfather        Social History     Socioeconomic History    Marital status: Legally    Tobacco Use    Smoking status: Former     Packs/day: 0.50     Years: 16.00     Pack years: 8.00     Types: Cigarettes     Start date: 10/1/2004     Quit date: 2021     Years since quittin.9    Smokeless tobacco: Never   Substance and Sexual Activity    Alcohol use: Not Currently    Drug use: Not Currently     Types: Marijuana, MDMA (Ecstacy)    Sexual activity: Yes     Partners: Female     Birth  control/protection: None       Time Tracking:     PT Received On: 04/19/23  PT Start Time: 0857     PT Stop Time: 0904  PT Total Time (min): 7 min     Billable Minutes: Evaluation 7 minutes    4/19/2023

## 2023-04-19 NOTE — ASSESSMENT & PLAN NOTE
-HeartMate 3 Implanted on 6/29/2022 as DT with RV failure on milrinone at 0.25 mcg/kg/min  -Continue Coumadin, Goal INR 2.0-3.0. Therapeutic today:2.4  -Antiplatelets: INV Aspirin (Alon Trial)  -LDH is stable 278.  Will continue to monitor daily.  -Speed set at 5100, LSL 4700 rpm  -Last ECHO 4/4/23 with IV bowing in to RV, AV does not open, Moderate-severe reduced RVSF, PASP 32, CVP 15, LVEDD 7.2 with LVAD speed 5100. IV milrinone increased from 0 .125 to 0.25 after RHC on 10/31/22     Procedure: Device Interrogation Including analysis of device parameters  Current Settings: Ventricular Assist Device  Review of device function is stable/unstable stable      Procedure: Device Interrogation Including analysis of device parameters  Current Settings: Ventricular Assist Device  Review of device function is stable/unstable stable    TXP LVAD INTERROGATIONS 4/19/2023 4/6/2023 4/6/2023 4/6/2023 4/6/2023 4/6/2023 4/6/2023   Type HeartMate3 HeartMate3 HeartMate3 HeartMate3 HeartMate3 HeartMate3 HeartMate3   Flow 4.0 4.2 3.7 3.9 3.7 4.1 4.0   Speed 5100 5100 5100 5100 5100 5100 5100   PI 6.8 5.8 6.1 6.1 5.8 6.0 6.0   Power (Serna) 3.8 3.7 3.8 3.7 3.8 3.7 3.7   LSL - 4700 4700 4700 4700 4700 4700   Low Flow Alarm - - - - - - -   High Power Alarm - - - - - - -   Pulsatility - Intermittent pulse - - - Intermittent pulse Intermittent pulse

## 2023-04-19 NOTE — SUBJECTIVE & OBJECTIVE
Interval History: Patient seen this morning. Reports he is feeling better and has no more recurrence of chest pain. CT head did not show any acute finding. Received IV lasix 40 once and started on valsartan 40 mg BID given high BP. He has appointments today for TTE and labs.     Continuous Infusions:   milrinone 20mg/100ml D5W (200mcg/ml) 0.25 mcg/kg/min (04/18/23 2356)     Scheduled Meds:   busPIRone  7.5 mg Oral Daily    famotidine  40 mg Oral Daily    gabapentin  100 mg Oral TID    insulin aspart U-100  0-16 Units Subcutaneous TIDWM    insulin detemir U-100 (Levemir)  22 Units Subcutaneous BID    levETIRAcetam  1,000 mg Oral BID    mupirocin   Nasal BID    potassium chloride SA  40 mEq Oral TID    spironolactone  25 mg Oral Daily    valsartan  80 mg Oral BID    [START ON 4/20/2023] warfarin  4.5 mg Oral Once per day on Sun Mon Tue Thu Fri Sat    warfarin  6 mg Oral Every Wed     PRN Meds:dextrose 10%, dextrose 10%, dextrose, dextrose, glucagon (human recombinant)    Review of patient's allergies indicates:   Allergen Reactions    Aspirin Other (See Comments)     Mr. Thacker is enrolled in Dr. Paige's Alon Trial and cannot have any aspirin and/or aspirin-containing products. DO NOT cancel any orders for INV Aspirin 100 mg/Placebo. If you have questions, please contact Isabel @ [phone], [phone],[email or web address], secure chat or MS Teams message.    Bumex [bumetanide] Hives    Lactose Diarrhea     Other reaction(s): Abdominal distension, gaseous    Torsemide Hives     Objective:     Vital Signs (Most Recent):  Temp: 97.9 °F (36.6 °C) (04/18/23 2357)  Pulse: 101 (04/19/23 0728)  Resp: 20 (04/19/23 0735)  BP: (!) 96/0 (04/19/23 0756)  SpO2: 97 % (04/19/23 0728)   Vital Signs (24h Range):  Temp:  [97.9 °F (36.6 °C)-98.4 °F (36.9 °C)] 97.9 °F (36.6 °C)  Pulse:  [] 101  Resp:  [14-27] 20  SpO2:  [94 %-99 %] 97 %  BP: ()/(0-92) 96/0     Patient Vitals for the past 72 hrs (Last 3 readings):   Weight    04/18/23 2102 101.6 kg (224 lb)     Body mass index is 28 kg/m².      Intake/Output Summary (Last 24 hours) at 4/19/2023 1050  Last data filed at 4/19/2023 0548  Gross per 24 hour   Intake --   Output 800 ml   Net -800 ml       Hemodynamic Parameters:       Telemetry: NSR    Physical Exam  Vitals and nursing note reviewed.   Constitutional:       General: He is not in acute distress.     Appearance: Normal appearance. He is not ill-appearing.   HENT:      Head: Normocephalic.   Eyes:      Pupils: Pupils are equal, round, and reactive to light.   Cardiovascular:      Rate and Rhythm: Normal rate and regular rhythm.      Comments: VAD humm  No tenderness to palpation on rib cage  Abdominal:      General: Abdomen is flat. There is no distension.      Palpations: Abdomen is soft. There is no mass.   Musculoskeletal:         General: No swelling. Normal range of motion.      Cervical back: Normal range of motion. No rigidity or tenderness.      Right lower leg: No edema.      Left lower leg: No edema.      Comments: R picc line dressing C/D/I   Skin:     General: Skin is warm and dry.      Capillary Refill: Capillary refill takes 2 to 3 seconds.   Neurological:      General: No focal deficit present.      Mental Status: He is alert and oriented to person, place, and time. Mental status is at baseline.   Psychiatric:         Mood and Affect: Mood normal.         Thought Content: Thought content normal.       Significant Labs:  CBC:  Recent Labs   Lab 04/18/23 2140 04/19/23 0432   WBC 9.76 8.73   RBC 4.63 4.70   HGB 11.7* 11.6*   HCT 37.9* 37.4*    241   MCV 82 80*   MCH 25.3* 24.7*   MCHC 30.9* 31.0*     BNP:  Recent Labs   Lab 04/18/23 2140   *     CMP:  Recent Labs   Lab 04/18/23 2140 04/19/23  0432   * 163*   CALCIUM 9.6 9.5   ALBUMIN 4.0 3.8   PROT 8.4 7.8    138   K 4.2 3.9   CO2 20* 23    104   BUN 6 7   CREATININE 1.4 1.3   ALKPHOS 113 111   ALT 41 39   AST 42* 37   BILITOT  0.5 0.5      Coagulation:   Recent Labs   Lab 04/18/23 2140 04/19/23  0432   INR 2.6* 2.4*   APTT  --  35.0*     LDH:  Recent Labs   Lab 04/18/23 2140 04/19/23  0432   * 278*     Microbiology:  Microbiology Results (last 7 days)       ** No results found for the last 168 hours. **            I have reviewed all pertinent labs within the past 24 hours.    Estimated Creatinine Clearance: 99.5 mL/min (based on SCr of 1.3 mg/dL).    Diagnostic Results:  I have reviewed and interpreted all pertinent imaging results/findings within the past 24 hours.    The left ventricle is severely enlarged with severely decreased systolic function.  The estimated ejection fraction is 15%.  Grade I left ventricular diastolic dysfunction.  There is severe left ventricular global hypokinesis.  LVEDd 7.2cm  There is an LVAD present. Base speed is 5100 RPMs. The pump type is a Heartmate III. The interventricular septum appears to bow into the right ventricle. The aortic valve does not open.  Moderate mitral regurgitation.  Mild aortic regurgitation.  Moderate right ventricular enlargement with moderately reduced right ventricular systolic function.  Mild pulmonic regurgitation.  Elyse stitch  Elevated central venous pressure (15 mmHg).  The estimated PA systolic pressure is 32 mmHg.

## 2023-04-19 NOTE — DISCHARGE SUMMARY
[facility] - Emergency Dept  Heart Transplant  Discharge Summary      Patient Name: Kevan Queen  MRN: [id number]  Admission Date: 4/18/2023  Hospital Length of Stay: 1 days  Discharge Date and Time: 04/19/2023 11:40 AM  Attending Physician: No att. providers found   Discharging Provider: Camilla Howard MD  Primary Care Provider: ORALIA Cline     HPI:   38 yo black male with stage d HFrEF, NICM, ? 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 He underwent RVAD removal and chest closure 6/30/2022.  He also completed a course of IV Abx for staph epi. He was weaned off  but he had to restarted due to RVF. He was eventually transitioned to milrinone (secondary to  shortage) now on 0.25 mcg/kg/min.     He presents to the ED secondary to diffuse upper torso burning most notably on left side of his chest sometimes radiating to the right side of his chest.  Symptoms have been ongoing for the past week and is associated with headaches, blurry vision and occasional lightheadedness.  His symptoms last for approximately 3 minutes and spontaneously resolved.  He denies any rashes, weakness, dizziness, palpitations, or syncope.  He also denies any orthopnea, PND lower extremity edema.  He is compliant with his medications with the exception of his metolazone which he has not been taking.  He called his coordinator and stated that he went to be admitted for further workup.  In the ED he is afebrile and hypertensive with a systolic pressure initially 140s to 110s.  CBC notable for mild anemia with a hemoglobin 11, baseline around 12.  CMP grossly unremarkable.  BNP mildly elevated.  Exam notable for mild JVD just below the angle mandible.       Hospital Course:   Admitted for observation for atypical chest  pain/headache/blurry vision. Found to be mildy volume overloaded, responded to lasix. BP was high, therefore valsartan was initiated. INR theraupetic. CT brain without acute findings. He will be discharged and will follow up as outpatient.     Consults (From admission, onward)          Status Ordering Provider     Inpatient consult to Registered Dietitian/Nutritionist  Once        Provider:  (Not yet assigned)    Acknowledged REZA EDWARDS     Inpatient consult to Endocrinology  Once        Provider:  (Not yet assigned)    Acknowledged REZA EDWARDS     Inpatient consult to Heart Transplant  Once        Provider:  (Not yet assigned)    Completed KYLEE MACEDO            Significant Diagnostic Studies: N/A    Pending Diagnostic Studies:       None          Final Active Diagnoses:    Diagnosis Date Noted POA    PRINCIPAL PROBLEM:  Acute on chronic combined systolic and diastolic heart failure [I50.43] 11/05/2020 Yes    LVAD (left ventricular assist device) present [Z95.811] 06/30/2022 Not Applicable    Headache [R51.9] 04/19/2023 Unknown      Problems Resolved During this Admission:      Discharged Condition: fair    Disposition: Home or Self Care    Follow Up:   Follow-up Information       Natalya Villatoro MD Follow up in 2 week(s).    Specialties: Transplant, Cardiology  Contact information:  [address]  [facility] [address] [phone]               Favian Souza .    Contact information:  [address]  [address]  [place] [address] [phone]                           Patient Instructions:   No discharge procedures on file.  Medications:  Reconciled Home Medications:      Medication List        START taking these medications      valsartan 40 MG tablet  Commonly known as: DIOVAN  Take 1 tablet (40 mg total) by mouth 2 (two) times daily.            CONTINUE taking these medications      busPIRone 7.5 MG tablet  Commonly known as: BUSPAR  Take 1 tablet (7.5  mg total) by mouth once daily at 6am.     famotidine 40 MG tablet  Commonly known as: PEPCID  Take 1 tablet (40 mg total) by mouth once daily.     ferrous gluconate 324 mg (37.5 mg iron) Tab tablet  Take 1 tablet (324 mg total) by mouth daily with breakfast.     furosemide 80 MG tablet  Commonly known as: LASIX  Take 1 tablet (80 mg total) by mouth once daily.     gabapentin 100 MG capsule  Commonly known as: NEURONTIN  Take 1 capsule (100 mg total) by mouth 3 (three) times daily.     HYDROcodone-acetaminophen 5-325 mg per tablet  Commonly known as: NORCO  Take 1 tablet by mouth every 8 (eight) hours as needed for Pain.     insulin aspart U-100 100 unit/mL (3 mL) Inpn pen  Commonly known as: NovoLOG  Inject 16 Units into the skin 3 (three) times daily with meals. Plus Sliding Scale: 151-200: +1, 201-250: +2, 251-300: +3, 301-350: +4 and call MD; Max Daily Dose: 60 units     INV ASPIRIN 100MG/PLACEBO  Take 1 capsule (100 mg) by mouth once daily. FOR INVESTIGATIONAL USE ONLY. Protocol: GASTON III subject # 2414.     LEVEMIR FLEXTOUCH U-100 INSULN 100 unit/mL (3 mL) Inpn pen  Generic drug: insulin detemir U-100 (Levemir)  Inject 22 Units into the skin 2 (two) times daily.     levETIRAcetam 1000 MG tablet  Commonly known as: KEPPRA  Take 1 tablet (1,000 mg total) by mouth 2 (two) times daily.     milrinone 20mg/100ml D5W (200mcg/ml) 20 mg/100 mL (200 mcg/mL) infusion  Commonly known as: PRIMACOR  Inject 34.875 mcg/min into the vein continuous.     mirtazapine 15 MG tablet  Commonly known as: REMERON  Take 1 tablet (15 mg total) by mouth nightly.     potassium chloride SA 20 MEQ tablet  Commonly known as: K-DUR,KLOR-CON  Take 2 tablets (40 mEq total) by mouth 3 (three) times daily.     spironolactone 25 MG tablet  Commonly known as: ALDACTONE  Take 1 tablet (25 mg total) by mouth once daily.     TRUE METRIX GLUCOSE METER Misc  Generic drug: blood-glucose meter  Use as instructed     TRUE METRIX GLUCOSE TEST STRIP  Strp  Generic drug: blood sugar diagnostic  Use to test blood glucose 4 (four) times daily.     TRUEPLUS LANCETS 33 gauge Misc  Generic drug: lancets  Use to test blood glucose 4 (four) times daily.     warfarin 3 MG tablet  Commonly known as: COUMADIN  Take 1.5 tablets (4.5mg) orally daily, except 2 tablets (6mg) on Wednesdays              Camilla Howard MD  Heart Transplant  [facility] - Emergency Dept

## 2023-04-19 NOTE — HOSPITAL COURSE
Admitted for observation for atypical chest pain. Find to be mildy volume overloaded, responding to lasix. BP was high, therefore valsartan was initiated. INR theraupetic. CT brain without acute findings. He will be discharged and will follow up as outpatient.

## 2023-04-19 NOTE — H&P
[facility] [place]  H&P  Heart failure Service      Kevan Queen  YOB: 1985  Medical Record Number:  [id number]  Attending Physician:  Shefali Andrade MD   Date of Admission: 4/18/2023       Hospital Day:  0  Current Principal Problem:  <principal problem not specified>      History     Cc:  Chest burning    HPI   36 yo black male with stage d HFrEF, NICM, ? 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 He underwent RVAD removal and chest closure 6/30/2022.  He also completed a course of IV Abx for staph epi. He was weaned off  but he had to restarted due to RVF. He was eventually transitioned to milrinone (secondary to  shortage) now on 0.25 mcg/kg/min.    He presents to the ED secondary to diffuse upper torso burning most notably on left side of his chest sometimes radiating to the right side of his chest.  Symptoms have been ongoing for the past week and is associated with headaches, blurry vision and occasional lightheadedness.  His symptoms last for approximately 3 minutes and spontaneously resolved.  He denies any rashes, weakness, dizziness, palpitations, or syncope.  He also denies any orthopnea, PND lower extremity edema.  He is compliant with his medications with the exception of his metolazone which he has not been taking.  He called his coordinator and stated that he went to be admitted for further workup.  In the ED he is afebrile and hypertensive with a systolic pressure initially 140s to 110s.  CBC notable for mild anemia with a hemoglobin 11, baseline around 12.  CMP grossly unremarkable.  BNP mildly elevated.  Exam notable for mild JVD just below the angle mandible.    Medications - Outpatient  Prior to Admission medications    Medication Sig Start Date End Date  Taking? Authorizing Provider   blood sugar diagnostic Strp Use to test blood glucose 4 (four) times daily. 12/16/22   Natalya Villatoro MD   blood-glucose meter Misc Use as instructed 5/27/22   Luca Lopez Jr., MD   busPIRone (BUSPAR) 7.5 MG tablet Take 1 tablet (7.5 mg total) by mouth once daily at 6am. 9/11/22 9/11/23  Dalia Crum MD   famotidine (PEPCID) 40 MG tablet Take 1 tablet (40 mg total) by mouth once daily. 9/11/22 9/11/23  Dalia Crum MD   ferrous gluconate 324 mg (37.5 mg iron) Tab tablet Take 1 tablet (324 mg total) by mouth daily with breakfast. 9/11/22 9/11/23  Dalia Crum MD   furosemide (LASIX) 80 MG tablet Take 1 tablet (80 mg total) by mouth once daily. 8/18/22 8/18/23  Josh Pulido MD   gabapentin (NEURONTIN) 100 MG capsule Take 1 capsule (100 mg total) by mouth 3 (three) times daily. 9/11/22 9/11/23  Dalia Crum MD   HYDROcodone-acetaminophen (NORCO) 5-325 mg per tablet Take 1 tablet by mouth every 8 (eight) hours as needed for Pain. 3/7/23   Rosio Armendariz, ORALIA   insulin aspart U-100 (NOVOLOG) 100 unit/mL (3 mL) InPn pen Inject 16 Units into the skin 3 (three) times daily with meals. Plus Sliding Scale: 151-200: +1, 201-250: +2, 251-300: +3, 301-350: +4 and call MD; Max Daily Dose: 60 units 12/16/22   Natalya Villatoro MD   insulin detemir U-100 (LEVEMIR FLEXTOUCH) 100 unit/mL (3 mL) SubQ InPn pen Inject 22 Units into the skin 2 (two) times daily. 12/16/22   Natalya Villatoro MD   INV ASPIRIN 100MG/PLACEBO Take 1 capsule (100 mg) by mouth once daily. FOR INVESTIGATIONAL USE ONLY. Protocol: GASTON III subject # 2414. 3/23/23   Natalya Villatoro MD   lancets 33 gauge Misc Use to test blood glucose 4 (four) times daily. 5/27/22   Luca Lopez Jr., MD   levETIRAcetam (KEPPRA) 1000 MG tablet Take 1 tablet (1,000 mg total) by mouth 2 (two) times daily. 9/11/22 9/11/23  Dalia Crum MD   milrinone 20mg/100ml D5W, 200mcg/ml, (PRIMACOR) 20 mg/100 mL (200 mcg/mL) infusion  Inject 34.875 mcg/min into the vein continuous. 4/25/22   Jeff Spencer PA-C   mirtazapine (REMERON) 15 MG tablet Take 1 tablet (15 mg total) by mouth nightly. 9/11/22   Dalia Crum MD   potassium chloride SA (K-DUR,KLOR-CON) 20 MEQ tablet Take 2 tablets (40 mEq total) by mouth 3 (three) times daily. 9/11/22   Dalia Crum MD   spironolactone (ALDACTONE) 25 MG tablet Take 1 tablet (25 mg total) by mouth once daily. 9/11/22 9/11/23  Dalia Crum MD   warfarin (COUMADIN) 3 MG tablet Take 1.5 tablets (4.5mg) orally daily, except 2 tablets (6mg) on Wednesdays 4/6/23   Marlo Marques MD   digoxin (LANOXIN) 125 mcg tablet Take 1 tablet (0.125 mg total) by mouth once daily. 4/26/22 5/27/22  Jeff Spencer PA-C   EScitalopram oxalate (LEXAPRO) 5 MG Tab Take 1 tablet (5 mg total) by mouth once daily. 5/27/22 7/27/22  Luca Lopez Jr., MD   insulin (LANTUS SOLOSTAR U-100 INSULIN) glargine 100 units/mL (3mL) SubQ pen Inject 10 Units into the skin every evening.  Patient not taking: Reported on 5/24/2022 5/20/22 5/27/22  Bautista Lynch III, MD   metOLazone (ZAROXOLYN) 2.5 MG tablet Take 2.5 mg by mouth every other day. 11/25/21 4/25/22  Historical Provider   metoprolol succinate (TOPROL-XL) 25 MG 24 hr tablet TAKE 1 TABLET(25 MG) BY MOUTH EVERY DAY 5/19/21 4/25/22  Luca Lopez Jr., MD   pantoprazole (PROTONIX) 40 MG tablet Take 1 tablet (40 mg total) by mouth once daily. 5/27/22 7/27/22  Luca Lopez Jr., MD         Medications - Current  Scheduled Meds:   [START ON 4/19/2023] busPIRone  7.5 mg Oral Daily    [START ON 4/19/2023] famotidine  40 mg Oral Daily    furosemide (LASIX) injection  40 mg Intravenous Once    [START ON 4/19/2023] gabapentin  100 mg Oral TID    [START ON 4/19/2023] insulin detemir U-100 (Levemir)  22 Units Subcutaneous BID    [START ON 4/19/2023] levETIRAcetam  1,000 mg Oral BID    [START ON 4/19/2023] potassium chloride SA  40 mEq Oral TID    [START ON 4/19/2023]  spironolactone  25 mg Oral Daily    valsartan  40 mg Oral BID    [START ON 4/20/2023] warfarin  4.5 mg Oral Once per day on Sun Mon Tue Thu Fri Sat    [START ON 4/19/2023] warfarin  6 mg Oral Once per day on Wed     Continuous Infusions:   milrinone 20mg/100ml D5W (200mcg/ml)       PRN Meds:.      Allergies  Review of patient's allergies indicates:   Allergen Reactions    Aspirin Other (See Comments)     Mr. Thacker is enrolled in Dr. Paige's Alon Trial and cannot have any aspirin and/or aspirin-containing products. DO NOT cancel any orders for INV Aspirin 100 mg/Placebo. If you have questions, please contact Isabel @ [phone], [phone],[email or web address], secure chat or MS Teams message.    Bumex [bumetanide] Hives    Lactose Diarrhea     Other reaction(s): Abdominal distension, gaseous    Torsemide Hives         Past Medical History  Past Medical History:   Diagnosis Date    Arthritis     Cardiomyopathy     CHF (congestive heart failure) 10/01/2020    Diabetes mellitus     Dilated cardiomyopathy 10/26/2020    Drug abuse 10/2020    Hyperosmolar hyperglycemic state (HHS) 5/25/2022    ICD (implantable cardioverter-defibrillator) in place 10/26/2020    Muscle cramping 6/15/2022    Renal disorder          Past Surgical History  Past Surgical History:   Procedure Laterality Date    APPLICATION OF WOUND VACUUM-ASSISTED CLOSURE DEVICE N/A 6/30/2022    Procedure: APPLICATION, WOUND VAC;  Surgeon: Luis F Paige MD;  Location: [facility] OR [address] [place] [address];  Service: Cardiovascular;  Laterality: N/A;  50 x 5 cm    CARDIAC DEFIBRILLATOR PLACEMENT      IMPLANTATION OF RIGHT VENTRICULAR ASSIST DEVICE (RVAD) N/A 6/29/2022    Procedure: INSERTION, RVAD;  Surgeon: Luis F Paige MD;  [facility];  Service: Cardiovascular;  Laterality: N/A;    INSERTION OF GRAFT TO PERICARDIUM Right 6/30/2022    Procedure: INSERTION-RIGHT VENTRICULAR ASSIST DEVICE;  Surgeon: Luis F Paige MD;  Location: [facility] OR [address] [place] [address];  Service:  Cardiovascular;  Laterality: Right;    IRRIGATION OF MEDIASTINUM  2022    Procedure: IRRIGATION, MEDIASTINUM;  Surgeon: Luis F Paige MD;  [facility];  Service: Cardiovascular;;    LEFT VENTRICULAR ASSIST DEVICE Left 2022    Procedure: INSERTION-LEFT VENTRICULAR ASSIST DEVICE;  Surgeon: Luis F Paige MD;  [address]  Service: Cardiovascular;  Laterality: Left;    LEFT VENTRICULAR ASSIST DEVICE N/A 2022    Procedure: INSERTION-LEFT VENTRICULAR ASSIST DEVICE;  Surgeon: Luis F Paige MD;  [address];  Service: Cardiovascular;  Laterality: N/A;    RIGHT HEART CATHETERIZATION Right 2022    Procedure: INSERTION, CATHETER, RIGHT HEART;  Surgeon: Luca Lopez Jr., MD;  Location: [facility] CATH LAB;  Service: Cardiology;  Laterality: Right;    RIGHT HEART CATHETERIZATION Right 2022    Procedure: INSERTION, CATHETER, RIGHT HEART;  Surgeon: Josh Pulido MD;  Location: [facility] CATH LAB;  Service: Cardiology;  Laterality: Right;    RIGHT HEART CATHETERIZATION Right 2022    Procedure: INSERTION, CATHETER, RIGHT HEART;  Surgeon: Dalia Crum MD;  Location: [facility] CATH LAB;  Service: Cardiology;  Laterality: Right;    RIGHT HEART CATHETERIZATION Right 10/31/2022    Procedure: INSERTION, CATHETER, RIGHT HEART;  Surgeon: Dalia Crum MD;  Location: [facility] CATH LAB;  Service: Cardiology;  Laterality: Right;    STERNAL WOUND CLOSURE N/A 2022    Procedure: CLOSURE, WOUND, STERNUM;  Surgeon: Luis F Paige MD;  Location: [address];  Service: Cardiovascular;  Laterality: N/A;         Social History  Social History     Socioeconomic History    Marital status: Legally    Tobacco Use    Smoking status: Former     Packs/day: 0.50     Years: 16.00     Pack years: 8.00     Types: Cigarettes     Start date: 10/1/2004     Quit date: 2021     Years since quittin.9    Smokeless tobacco: Never   Substance and Sexual Activity     Alcohol use: Not Currently    Drug use: Not Currently     Types: Marijuana, MDMA (Ecstacy)    Sexual activity: Yes     Partners: Female     Birth control/protection: None         ROS   Admits Denies   Constitutional  Chills, diaphoresis, malaise   Eyes  Visual changes   ENMT  Dysphagia, Epistaxis, nasal congestion, hearing loss   Cardiovascular Chest burning Chest pain, palpitations, edema   Respiratory  Cough, dyspnea, wheezing   Gastrointestinal  Nausea, vomiting, constipation, diarrhea, anorexia.     Genitourinary  Dysuria, incontinence   Musculoskeletal  Myalgias, joint pain, joint swelling   Integumentary  Rash, inflammation, burning   Neruo-Psychiatric  Anxiety, insomnia.  Changes in speech, strength, sensation.     Endocrine     Hematologic  Abnormal bruising, bleeding   Immunologic  Inflammation, pain at IV sites.  Pruritis.         Physical Examination         Vital Signs  Vitals  Temp: 98.4 °F (36.9 °C)  Temp Source: Oral  Pulse: 99  Resp: (!) 22  SpO2: 98 %  BP: (!) 118/92  MAP (mmHg): 102          24 Hour VS Range    Temp:  [98.4 °F (36.9 °C)]   Pulse:  []   Resp:  [17-22]   BP: (118-120)/(89-92)   SpO2:  [95 %-98 %]   No intake or output data in the 24 hours ending 04/18/23 7569      General:  Lying in bed no acute distress  Head: NCAT  Eyes: conjunctivae and lids normal, no scleral icterus, EOMI.  ENMT:  no gingival bleeding, normal oral mucosa without pallor or cyanosis.   Neck:  JVP normal.  Trachea non-displaced.     Chest:  Normal respiratory effort.  Chest clear to auscultation.  No wheezes, rales, or rhonchi.  Heart:  LVAD hum noted  Abdomen:  Non-distended, normal bowel sounds, non-tender.  No hepato-jugular reflux.  Extremities:  No edema. Normal capillary refill.    Skin:  Warm and dry.  No cyanosis or pallor.  No ulcers, stasis.  IV sites without tenderness or inflammation.    Neurological / Psychiatric:  Oriented to person, time, and place.  No facial asymmetry, drift.  Fluent without  dysarthria.  Mood euthymic, affect normal.           Data       Recent Labs   Lab 04/18/23 2140   WBC 9.76   HGB 11.7*   HCT 37.9*           Recent Labs   Lab 04/18/23 2140   INR 2.6*        Recent Labs   Lab 04/18/23 2140      K 4.2      CO2 20*   BUN 6   CREATININE 1.4   ANIONGAP 9   CALCIUM 9.6        Recent Labs   Lab 04/18/23 2140   PROT 8.4   ALBUMIN 4.0   BILITOT 0.5   ALKPHOS 113   AST 42*   ALT 41        Recent Labs   Lab 04/18/23 2140   TROPONINI 0.147*        BNP (pg/mL)   Date Value   04/18/2023 289 (H)   03/23/2023 682 (H)   02/02/2023 375 (H)   12/16/2022 37   12/15/2022 91     Natriuretic Peptide (pg/mL)   Date Value   04/02/2023 422.4 (H)   12/30/2022 188.1 (H)           Assessment & Plan      Unspecified chest discomfort:  Etiology unclear.  Labs grossly unremarkable.  -will check TSH  -will admit for observation    Headaches  -CT head     Nonischemic cardiomyopathy EF 15%  -will continue his home medications with the addition of valsartan 40 mg b.i.d.  -echo reviewed    Hypertension  -valsartan 40 mg b.i.d. in addition with his home medication    LVAD (left ventricular assist device) present  -HeartMate 3 Implanted on 6/29/2022 as DT  -Continue Coumadin, Goal INR 2.0-3.0. Subherapeutic today: 1.7.   -Antiplatelets: INV Aspirin (Alon Trial)  -LDH is stable overall today.  Will continue to monitor daily.  -Speed set at 5100, LSL 4700 rpm  -ECHO pending. Last ECHO 9/1/22 with IV midline, AV does not open, Moderate-severe reduced RVSF, PASP 22, CVP 3, LVEDD 5.97 with LVAD speed 5100. IV milrinone increased from 0 .125 to 0.25 after RHC on 10/31/22     Procedure: Device Interrogation Including analysis of device parameters  Current Settings: Ventricular Assist Device  Review of device function is stable/unstable stable    TXP LVAD INTERROGATIONS 4/6/2023 4/6/2023/2023 4/6/2023 4/6/2023 4/6/2023 4/6/2023 4/5/2023   Type HeartMate3 HeartMate3 HeartMate3 HeartMate3 HeartMate3 HeartMate3  HeartMate3   Flow 4.2 3.7 3.9 3.7 4.1 4.0 3.8   Speed 5100 5100 5100 5100 5100 5100 5100   PI 5.8 6.1 6.1 5.8 6.0 6.0 7.3   Power (Serna) 3.7 3.8 3.7 3.8 3.7 3.7 3.8   LSL 4700 4700 4700 4700 4700 4700 4700   Low Flow Alarm - - - - - - -   High Power Alarm - - - - - - -   Pulsatility Intermittent pulse - - - Intermittent pulse Intermittent pulse Intermittent pulse        Type 2 diabetes mellitus with hyperglycemia  - Last A1c: 9.2, Repeat A1c  - Home Regimen: Detemir 22 units BID; Aspart 14 TID  - Will start Levemir 15 units, BID and Aspart 5 TID and up-titrate while inpatient    Temporal lobe seizure  - Resume Keppra  - Seizure precautions        Signed:  Wilmer Daniel M.D.  Cardiovascular Fellow  [facility]

## 2023-04-19 NOTE — ED NOTES
The patient presented to the ER with 4 batteries for his LVAD device but did not bring his  with him therefore, a  was borrowed from the CSU unit. The patient's LVAD batteries are currently charging in his room, there is also a main  in his room plugged to the emergency plug at this time the patient has refused to be attached to stated . Will continue to monitor patient.

## 2023-04-19 NOTE — LETTER
April 19, 2023        Inderjit Hendricks  [address]  [address]  [place] [address]  Phone: [phone]  Fax: [phone]     Josh Pulido  [address] [facility] [address]  Phone: [phone]  Fax: [phone]             [id number]  [address]  [place] [address]  Phone: [phone]   Patient: Kevan Queen   MR Number: [id number]   YOB: 1985   Date of Visit: 4/19/2023       Dear Dr. Inderjit Hendricks, Josh Pulido    Thank you for referring Kevan Queen to me for evaluation. Attached you will find relevant portions of my assessment and plan of care.    If you have questions, please do not hesitate to call me. I look forward to following Kevan Queen along with you.    Sincerely,    Marlo Marques MD    Enclosure    If you would like to receive this communication electronically, please contact [email or web address] or [phone] to request [email or web address] Link access.    [email or web address] Link is a tool which provides read-only access to select patient information with whom you have a relationship. Its easy to use and provides real time access to review your patients record including encounter summaries, notes, results, and demographic information.    If you feel you have received this communication in error or would no longer like to receive these types of communications, please e-mail [email or web address]

## 2023-04-19 NOTE — HPI
38 yo black male with stage d HFrEF, NICM, ? 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 He underwent RVAD removal and chest closure 6/30/2022.  He also completed a course of IV Abx for staph epi. He was weaned off  but he had to restarted due to RVF. He was eventually transitioned to milrinone (secondary to  shortage) now on 0.25 mcg/kg/min.     He presents to the ED secondary to diffuse upper torso burning most notably on left side of his chest sometimes radiating to the right side of his chest.  Symptoms have been ongoing for the past week and is associated with headaches, blurry vision and occasional lightheadedness.  His symptoms last for approximately 3 minutes and spontaneously resolved.  He denies any rashes, weakness, dizziness, palpitations, or syncope.  He also denies any orthopnea, PND lower extremity edema.  He is compliant with his medications with the exception of his metolazone which he has not been taking.  He called his coordinator and stated that he went to be admitted for further workup.  In the ED he is afebrile and hypertensive with a systolic pressure initially 140s to 110s.  CBC notable for mild anemia with a hemoglobin 11, baseline around 12.  CMP grossly unremarkable.  BNP mildly elevated.  Exam notable for mild JVD just below the angle mandible.

## 2023-04-19 NOTE — PROVIDER PROGRESS NOTES - EMERGENCY DEPT.
Encounter Date: 4/18/2023    ED Physician Progress Notes        Physician Note:   11:00 PM  Patient received in turned over from Dr. Andrade pending HTS evaluation and admission.    CT head without acute process.    12:00 AM  Cardiology at bedside, orders placed.    JAIMIE Benites MD  Staff ED Physician  04/19/2023 1:01 AM

## 2023-04-19 NOTE — HPI
Reason for Consult: Management of T2DM, Hyperglycemia      Surgical Procedure and Date: LVAD 06/29/2022     Diabetes diagnosis year: 2022     Home Diabetes Medications:   -Levemir 22 units BID.   -Novolog 16 units TIDWM in addition to the following correction scale:     150 - 200 + 1 unit     201 - 250 + 2 units     251 - 300 + 3 units     301 - 350 + 4 units      > 350   + 5 units     How often checking glucose at home?  Uses [employer] [facility]    BG readings on regimen: 200 or higher and occasionally had just reading of HIGH   Hypoglycemia on the regimen?  No  Missed doses on regimen?  Yes, occasionally skips breakfast do to sleeping in and will skip Novolog with breakfast      Diabetes Complications include:     Hyperglycemia     Complicating diabetes co morbidities:   History of MI, CHF, and CKD         HPI:   Patient is a 38 y.o. male with a diagnosis of ***

## 2023-04-19 NOTE — PROGRESS NOTES
[facility] - Emergency Dept  Heart Transplant  Progress Note    Patient Name: Kevan Queen  MRN: [id number]  Admission Date: 4/18/2023  Hospital Length of Stay: 1 days  Attending Physician: Natalya Villatoro MD  Primary Care Provider: ORALIA Cline  Principal Problem:Acute on chronic combined systolic and diastolic heart failure    Subjective:     Interval History: Patient seen this morning. Reports he is feeling better and has no more recurrence of chest pain. CT head did not show any acute finding. Received IV lasix 40 once and started on valsartan 40 mg BID given high BP. He has appointments today for TTE and labs.     Continuous Infusions:   milrinone 20mg/100ml D5W (200mcg/ml) 0.25 mcg/kg/min (04/18/23 0667)     Scheduled Meds:   busPIRone  7.5 mg Oral Daily    famotidine  40 mg Oral Daily    gabapentin  100 mg Oral TID    insulin aspart U-100  0-16 Units Subcutaneous TIDWM    insulin detemir U-100 (Levemir)  22 Units Subcutaneous BID    levETIRAcetam  1,000 mg Oral BID    mupirocin   Nasal BID    potassium chloride SA  40 mEq Oral TID    spironolactone  25 mg Oral Daily    valsartan  80 mg Oral BID    [START ON 4/20/2023] warfarin  4.5 mg Oral Once per day on Sun Mon Tue Thu Fri Sat    warfarin  6 mg Oral Every Wed     PRN Meds:dextrose 10%, dextrose 10%, dextrose, dextrose, glucagon (human recombinant)    Review of patient's allergies indicates:   Allergen Reactions    Aspirin Other (See Comments)     Mr. Thacker is enrolled in Dr. Paige's Alon Trial and cannot have any aspirin and/or aspirin-containing products. DO NOT cancel any orders for INV Aspirin 100 mg/Placebo. If you have questions, please contact Isabel @ [phone], [phone],[email or web address], secure chat or MS Teams message.    Bumex [bumetanide] Hives    Lactose Diarrhea     Other reaction(s): Abdominal distension, gaseous    Torsemide Hives     Objective:     Vital Signs (Most Recent):  Temp: 97.9 °F (36.6 °C) (04/18/23  2357)  Pulse: 101 (04/19/23 0728)  Resp: 20 (04/19/23 0735)  BP: (!) 96/0 (04/19/23 0756)  SpO2: 97 % (04/19/23 0728)   Vital Signs (24h Range):  Temp:  [97.9 °F (36.6 °C)-98.4 °F (36.9 °C)] 97.9 °F (36.6 °C)  Pulse:  [] 101  Resp:  [14-27] 20  SpO2:  [94 %-99 %] 97 %  BP: ()/(0-92) 96/0     Patient Vitals for the past 72 hrs (Last 3 readings):   Weight   04/18/23 2102 101.6 kg (224 lb)     Body mass index is 28 kg/m².      Intake/Output Summary (Last 24 hours) at 4/19/2023 1050  Last data filed at 4/19/2023 0548  Gross per 24 hour   Intake --   Output 800 ml   Net -800 ml       Hemodynamic Parameters:       Telemetry: NSR    Physical Exam  Vitals and nursing note reviewed.   Constitutional:       General: He is not in acute distress.     Appearance: Normal appearance. He is not ill-appearing.   HENT:      Head: Normocephalic.   Eyes:      Pupils: Pupils are equal, round, and reactive to light.   Cardiovascular:      Rate and Rhythm: Normal rate and regular rhythm.      Comments: VAD humm  No tenderness to palpation on rib cage  Abdominal:      General: Abdomen is flat. There is no distension.      Palpations: Abdomen is soft. There is no mass.   Musculoskeletal:         General: No swelling. Normal range of motion.      Cervical back: Normal range of motion. No rigidity or tenderness.      Right lower leg: No edema.      Left lower leg: No edema.      Comments: R picc line dressing C/D/I   Skin:     General: Skin is warm and dry.      Capillary Refill: Capillary refill takes 2 to 3 seconds.   Neurological:      General: No focal deficit present.      Mental Status: He is alert and oriented to person, place, and time. Mental status is at baseline.   Psychiatric:         Mood and Affect: Mood normal.         Thought Content: Thought content normal.       Significant Labs:  CBC:  Recent Labs   Lab 04/18/23  2140 04/19/23  0432   WBC 9.76 8.73   RBC 4.63 4.70   HGB 11.7* 11.6*   HCT 37.9* 37.4*     241   MCV 82 80*   MCH 25.3* 24.7*   MCHC 30.9* 31.0*     BNP:  Recent Labs   Lab 04/18/23 2140   *     CMP:  Recent Labs   Lab 04/18/23 2140 04/19/23 0432   * 163*   CALCIUM 9.6 9.5   ALBUMIN 4.0 3.8   PROT 8.4 7.8    138   K 4.2 3.9   CO2 20* 23    104   BUN 6 7   CREATININE 1.4 1.3   ALKPHOS 113 111   ALT 41 39   AST 42* 37   BILITOT 0.5 0.5      Coagulation:   Recent Labs   Lab 04/18/23 2140 04/19/23 0432   INR 2.6* 2.4*   APTT  --  35.0*     LDH:  Recent Labs   Lab 04/18/23 2140 04/19/23 0432   * 278*     Microbiology:  Microbiology Results (last 7 days)       ** No results found for the last 168 hours. **            I have reviewed all pertinent labs within the past 24 hours.    Estimated Creatinine Clearance: 99.5 mL/min (based on SCr of 1.3 mg/dL).    Diagnostic Results:  I have reviewed and interpreted all pertinent imaging results/findings within the past 24 hours.     The left ventricle is severely enlarged with severely decreased systolic function.   The estimated ejection fraction is 15%.   Grade I left ventricular diastolic dysfunction.   There is severe left ventricular global hypokinesis.   LVEDd 7.2cm   There is an LVAD present. Base speed is 5100 RPMs. The pump type is a Heartmate III. The interventricular septum appears to bow into the right ventricle. The aortic valve does not open.   Moderate mitral regurgitation.   Mild aortic regurgitation.   Moderate right ventricular enlargement with moderately reduced right ventricular systolic function.   Mild pulmonic regurgitation.   Elyse stitch   Elevated central venous pressure (15 mmHg).   The estimated PA systolic pressure is 32 mmHg.    Assessment and Plan:     36 yo black male with stage d HFrEF, NICM, ? Familial CM (Father had LVAD and subsequent heart transplant), h/o PSA, DM who is s/p DT-HM3 implantation 6/23/2022 with early RV failure requiring RVAD with ProTek Duo after admitted with  ADHF/cardiogenic shock on home milrinone requiring IABP. He underwent RVAD removal and chest closure 6/30/2022.  He also completed a course of IV Abx for staph epi. He was weaned off  but he had to restarted due to RVF. He was eventually transitioned to milrinone (secondary to  shortage) now on 0.25 mcg/kg/min.     He presents to the ED secondary to diffuse upper torso burning most notably on left side of his chest sometimes radiating to the right side of his chest.  Symptoms have been ongoing for the past week and is associated with headaches, blurry vision and occasional lightheadedness.  His symptoms last for approximately 3 minutes and spontaneously resolved.  He denies any rashes, weakness, dizziness, palpitations, or syncope.  He also denies any orthopnea, PND lower extremity edema.  He is compliant with his medications with the exception of his metolazone which he has not been taking.  He called his coordinator and stated that he went to be admitted for further workup.  In the ED he is afebrile and hypertensive with a systolic pressure initially 140s to 110s.  CBC notable for mild anemia with a hemoglobin 11, baseline around 12.  CMP grossly unremarkable.  BNP mildly elevated.  Exam notable for mild JVD just below the angle mandible.      * Acute on chronic combined systolic and diastolic heart failure  - Supported with LVAD as below  - Hypertensive with MAPs 90 with mild congestion on admission  - s/p IV lasix 40 mg with good response  - Valsartan 40 mg BID started, which can be upitrated in clinic  - Recommend lasix 80 mg in am and 40 mg in pm for 3 days, then back to 40 mg BID  - Continue spirinolactone 25 mg (can consider to change to eplerenone as he reports mild breast enlargement )  - Continue potassium supplementation  - Continue milrinone as below  - DC today with close follow up in clinic    LVAD (left ventricular assist device) present  -HeartMate 3 Implanted on 6/29/2022 as DT with RV  failure on milrinone at 0.25 mcg/kg/min  -Continue Coumadin, Goal INR 2.0-3.0. Therapeutic today:2.4  -Antiplatelets: INV Aspirin (Alon Trial)  -LDH is stable 278.  Will continue to monitor daily.  -Speed set at 5100, LSL 4700 rpm  -Last ECHO 4/4/23 with IV bowing in to RV, AV does not open, Moderate-severe reduced RVSF, PASP 32, CVP 15, LVEDD 7.2 with LVAD speed 5100. IV milrinone increased from 0 .125 to 0.25 after RHC on 10/31/22     Procedure: Device Interrogation Including analysis of device parameters  Current Settings: Ventricular Assist Device  Review of device function is stable/unstable stable      Procedure: Device Interrogation Including analysis of device parameters  Current Settings: Ventricular Assist Device  Review of device function is stable/unstable stable    TXP LVAD INTERROGATIONS 4/19/2023 4/6/2023 4/6/2023 4/6/2023 4/6/2023 4/6/2023 4/6/2023   Type HeartMate3 HeartMate3 HeartMate3 HeartMate3 HeartMate3 HeartMate3 HeartMate3   Flow 4.0 4.2 3.7 3.9 3.7 4.1 4.0   Speed 5100 5100 5100 5100 5100 5100 5100   PI 6.8 5.8 6.1 6.1 5.8 6.0 6.0   Power (Serna) 3.8 3.7 3.8 3.7 3.8 3.7 3.7   LSL - 4700 4700 4700 4700 4700 4700   Low Flow Alarm - - - - - - -   High Power Alarm - - - - - - -   Pulsatility - Intermittent pulse - - - Intermittent pulse Intermittent pulse       Headache  - Resolved  - CT head no acute abnormality      Camilla Howard MD  Heart Transplant  [facility] - Emergency Dept

## 2023-04-19 NOTE — ED NOTES
APPEARANCE: awake and alert in NAD. Pt resting in stretcher.   SKIN: warm, dry and intact. No breakdown or bruising.  MUSCULOSKELETAL: Patient moving all extremities spontaneously, no obvious swelling or deformities noted. Ambulates independently.  RESPIRATORY: Denies shortness of breath. Respirations unlabored. On RA.  CARDIAC: Denies CP, 2+ distal pulses; no peripheral edema.   ABDOMEN: S/ND/NT, Denies nausea/vomiting/diarrhea. LVAD line in place; dressing clean, dry, and intact.  : voids spontaneously, denies difficulty  Neurologic: AAO x 4; follows commands equal strength in all extremities; denies numbness/tingling. Denies dizziness/lightheadedness.

## 2023-04-19 NOTE — PROGRESS NOTES
Subjective:   Patient ID:  Kevan Queen is a 38 y.o. male who presents for LVAD followup visit.    Implant date: 6/23/22    TXP JOY INTERROGATIONS 4/19/2023   Type HeartMate3   Flow 3.4   Speed 5100   PI 6.9   Power (Serna) 3.7   LSL 4700   Pulsatility -     39 YO M w/ PMH of NICM, ? 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 He underwent RVAD removal and chest closure 6/30/2022.  He also completed a course of IV Abx for staph epi. He was weaned off  but he had to restarted due to RVF. He was eventually transitioned to milrinone (secondary to  shortage) now on 0.25 mcg/kg/min. He also has a history of unclear syncope vs seizures and is followed by neuro for this and is on Keppra.    He was seen in clinic last 1 month prior by Dr Villatoro. Subsequent to this he had an admission to our hospital between 4/2/23 to 4/8/23. He had called stating he was running out of his milrinone. He was admitted after he had run out of his milrinone. He was diuresed approx 6 L and discharged home with IV milrinone. He also came for a brief observation admission yesterday and was discharged this morning. He had atypical chest pain/headache/blurry vision. Found to be mildy volume overloaded, responded to lasix. BP was high, therefore valsartan was initiated. INR theraupetic. CT brain without acute findings.    He presents to clinic for follow-up.  Says that he feels better than this morning when he left the hospital.  At present has no cardiovascular complaints.  Denies chest discomfort, shortness of breath, palpitations, presyncope, syncope, leg swelling, PND, or orthopnea.     Implant Date:6/29/2022  Initials:JxP     Heartmate 3 RPM 5100     INR goal: 2-3   Bridge with Heparin/lovenox   Antiplatelets: Alon trial   Inotropes:  "Milrinone @ 0.25 mcg/kg/min in D5W     GIB:  Integrelin/TPA:  Stroke:     DLES:"1"  ABX:   Dressing: Daily with daily kit     Appts: Monthly  Home Health: na     Speed changes  Date-Speed   7/6/22 5300   7/11/22 5200      : 5100          Comments:      6 Minute Walk (6 months post implant) ;Done      Cardiac Rehab Order Placed within 90 Days Post Implant:   Due starting 6 weeks post Implant: DUE 8/10/22  Completed: yes Date: 8/4/22     Last Echo:04/19/2023 Due: 10/2023 (Every 6 months)  Last Lipids: 02/02/2023 Due: 8/2023 (Every 6 months)    Review of Systems   Constitutional: Negative for chills and fever.   HENT:  Negative for hearing loss.    Eyes:  Negative for visual disturbance.   Cardiovascular:  Negative for chest pain, dyspnea on exertion, irregular heartbeat, leg swelling, orthopnea, palpitations, paroxysmal nocturnal dyspnea and syncope.   Respiratory:  Negative for cough and shortness of breath.    Musculoskeletal:  Negative for muscle weakness.   Gastrointestinal:  Negative for diarrhea, nausea and vomiting.   Neurological:  Negative for focal weakness.   Psychiatric/Behavioral:  Negative for memory loss.      Objective:   Blood pressure (!) 90/0, pulse (!) 120, temperature 98.2 °F (36.8 °C), temperature source Oral, height 6' 3" (1.905 m), weight 100.7 kg (222 lb).body mass index is 27.75 kg/m².    Doppler: 90    Physical Exam  Vitals reviewed.   Constitutional:       General: He is not in acute distress.  HENT:      Head: Atraumatic.   Eyes:      Extraocular Movements: Extraocular movements intact.   Cardiovascular:      Comments: LVAD hum present. JVP just above clavicle at 45 degrees.  Pulmonary:      Breath sounds: Normal breath sounds.   Abdominal:      Palpations: Abdomen is soft.      Tenderness: There is no abdominal tenderness.   Musculoskeletal:         General: Normal range of motion.      Right lower leg: No edema.      Left lower leg: No edema.   Neurological:      General: No focal " deficit present.      Mental Status: He is alert and oriented to person, place, and time.       Lab Results   Component Value Date    WBC 10.45 04/19/2023    HGB 12.7 (L) 04/19/2023    HCT 41.3 04/19/2023    MCV 82 04/19/2023     04/19/2023    CHLORIDE 105 04/02/2023    CO2 24 04/19/2023    CREATININE 1.4 04/19/2023    GLUCOSE 115 (H) 04/02/2023    CALCIUM 10.1 04/19/2023    ALKALINEPHOS 98 06/02/2022    ALBUMIN 4.2 04/19/2023    AST 42 (H) 04/19/2023     (H) 04/19/2023    ALT 43 04/19/2023     (H) 04/19/2023       Lab Results   Component Value Date    INR 1.9 (H) 04/19/2023    INR 2.4 (H) 04/19/2023    INR 2.6 (H) 04/18/2023    PROTIME 20.2 (H) 04/02/2023    PROTIME 19.1 (H) 03/08/2023    PROTIME 23.7 (H) 12/30/2022       BNP   Date Value Ref Range Status   04/19/2023 234 (H) 0 - 99 pg/mL Final     Comment:     Values of less than 100 pg/ml are consistent with non-CHF populations.   04/18/2023 289 (H) 0 - 99 pg/mL Final     Comment:     Values of less than 100 pg/ml are consistent with non-CHF populations.   03/23/2023 682 (H) 0 - 99 pg/mL Final     Comment:     Values of less than 100 pg/ml are consistent with non-CHF populations.     Natriuretic Peptide   Date Value Ref Range Status   04/02/2023 422.4 (H) <=100.0 pg/mL Final       LD   Date Value Ref Range Status   04/19/2023 292 (H) 110 - 260 U/L Final     Comment:     Results are increased in hemolyzed samples.   04/19/2023 278 (H) 110 - 260 U/L Final     Comment:     Results are increased in hemolyzed samples.   04/18/2023 435 (H) 110 - 260 U/L Final     Comment:     Results are increased in hemolyzed samples.  *Result may be interfered by visible hemolysis         Labs were reviewed with the patient.    No results found for this or any previous visit.    No results found for this or any previous visit.      Assessment:      1. Seizure-like activity    2. LVAD (left ventricular assist device) present    3. Temporal lobe seizure    4.  Awareness alteration, transient    5. Right heart failure secondary to left heart failure-post LVAD surgery    6. Type 2 diabetes mellitus with hyperglycemia, with long-term current use of insulin        Plan:     - presents today for follow-up.  Overall doing well.  No cardiovascular complaints at present.  - Doppler slightly elevated at 90, but was started on valsartan when discharged from the hospital earlier this morning.  Is yet to  this prescription.  - continue current medical therapy unchanged, including the new valsartan for hypertension.  - continue Keppra    Patient is now NYHA II  Recommend 2 gram sodium restriction and 1500cc fluid restriction.  Encourage physical activity with graded exercise program.  Requested patient to weigh themselves daily, and to notify us if their weight increases by more than 3 lbs in 1 day or 5 lbs in 1 week.     Patient advised that it is recommended that all patients, and their close contacts and household members receive Covid vaccination.    Listed for transplant: No    UNOS Patient Status  Functional Status: 80% - Normal activity with effort: some symptoms of disease  Physical Capacity: No Limitations  Working for Income: Unknown         Additionally, the patient has a patient centered goal of being able to improve their quality of life

## 2023-04-20 NOTE — PROGRESS NOTES
Date of Implant with Heartmate 3 LVAD: 6/29/22    PATIENT ARRIVED IN CLINIC:  Ambulatory   Accompanied by: wife and kids  Complaints/reason for visit today: routine    Vitals  Temperature, oral:   Temp Readings from Last 1 Encounters:   04/19/23 98.2 °F (36.8 °C) (Oral)     Blood Pressure:   BP Readings from Last 3 Encounters:   04/19/23 (!) 90/0   04/19/23 (!) 96/0   04/06/23 103/72        VAD Interrogation:  TXP JOY INTERROGATIONS 4/19/2023 4/6/2023 4/6/2023   Type HeartMate3 - -   Flow 3.4 - -   Speed 5100 - -   PI 6.9 - -   Power (Serna) 3.7 - -   LSL 4700 - -   Pulsatility - Intermittent pulse Intermittent pulse     HCT:   Lab Results   Component Value Date    HCT 41.3 04/19/2023    HCT 38 04/05/2023       History Log: JXP  Problems / Issues / Alarms with VAD if any: None noted  VAD Sounds: HM3 Smooth    VAD Binder With Patient: no   Reviewed VAD Numbers In Binder: no    Equipment:  Emergency Equipment With Patient: yes   Any Equipment Issues: None noted   It is medically necessary to ensure patient has properly functioning equipment and wearables to prevent infection, injury or death to patient.     DLES Assessment:  Appearance Of Driveline: 1  Antibiotics: NO  Velour: no  Manual & Visual Inspection Of Driveline: No kinks or tears noted  Stabilization Device In Use: yes, melendez securement device    Heartmate 3 Module Cable:  No yellow exposed and Attempted to unscrew modular cable to ensure it will be able to come lose in the event we ever need to change the modular cable while patient held the driveline in place so it would not move. Modular cable connection able to be unscrewed and re-tightened. Instructed pt to perform this weekly.    Patient MyChart Questionnaire: No flowsheet data found.     Assessment:   PAIN: NO  Complaints Of Nausea / Vomiting: None noted    Appearance and Frequency Of Stools: normal and formed without blood & daily  Color Of Urine: clear/yellow  Coping/Depression/Anxiety: coping  okay  Sleep Habits: 7 hrs /night  Sleep Aids: None noted  Showering: Yes, reminded to change dressing immediately after drying off  Activity/Exercise: walking, diet change   Driving: Yes. Reminded to pull over should there be an alarm before looking down at controller.    DLES Dressing Care:   Frequency of Dressing Changes: every other day & daily kit  Pt In Need Of Management Kits?:yes -   2 Box of daily kit  It is medically necessary to have VAD management kits in order to prevent infection or to assist in the healing of an infected DLES.    Labs:    Chemistry        Component Value Date/Time     04/19/2023 1136    K 4.0 04/19/2023 1136     04/19/2023 1136    CO2 24 04/19/2023 1136    BUN 9 04/19/2023 1136    CREATININE 1.4 04/19/2023 1136     (H) 04/19/2023 1136        Component Value Date/Time    CALCIUM 10.1 04/19/2023 1136    ALKPHOS 118 04/19/2023 1136    AST 42 (H) 04/19/2023 1136    ALT 43 04/19/2023 1136    BILITOT 0.6 04/19/2023 1136    ESTGFRAFRICA >60.0 07/27/2022 1229    EGFRNONAA 54.2 (A) 07/27/2022 1229            Magnesium   Date Value Ref Range Status   04/19/2023 1.6 1.6 - 2.6 mg/dL Final       Lab Results   Component Value Date    WBC 10.45 04/19/2023    HGB 12.7 (L) 04/19/2023    HCT 41.3 04/19/2023    MCV 82 04/19/2023     04/19/2023       Lab Results   Component Value Date    INR 1.9 (H) 04/19/2023    INR 2.4 (H) 04/19/2023    INR 2.6 (H) 04/18/2023    PROTIME 20.2 (H) 04/02/2023    PROTIME 19.1 (H) 03/08/2023    PROTIME 23.7 (H) 12/30/2022       BNP   Date Value Ref Range Status   04/19/2023 234 (H) 0 - 99 pg/mL Final     Comment:     Values of less than 100 pg/ml are consistent with non-CHF populations.   04/18/2023 289 (H) 0 - 99 pg/mL Final     Comment:     Values of less than 100 pg/ml are consistent with non-CHF populations.   03/23/2023 682 (H) 0 - 99 pg/mL Final     Comment:     Values of less than 100 pg/ml are consistent with non-CHF populations.      Natriuretic Peptide   Date Value Ref Range Status   04/02/2023 422.4 (H) <=100.0 pg/mL Final       LD   Date Value Ref Range Status   04/19/2023 292 (H) 110 - 260 U/L Final     Comment:     Results are increased in hemolyzed samples.   04/19/2023 278 (H) 110 - 260 U/L Final     Comment:     Results are increased in hemolyzed samples.   04/18/2023 435 (H) 110 - 260 U/L Final     Comment:     Results are increased in hemolyzed samples.  *Result may be interfered by visible hemolysis         Labs reviewed with patient: YES     Medication Reconciliation: per MA.  New Medication Detail Provided: no  Coumadin Managed by: [facility]    Education: Reviewed driveline care, emergency procedures, how to change the controller, alarms with patient, as well as discussed how to page the VAD coordinator in case of an emergency.   Educated patient/family that chest compressions are allowed in the event they are needed.    Reminded patient/caregiver not to touch their face and to cover their mouth when they cough or sneeze.   Vaccines: Pt informed that we are encouraging all VAD patients to receive the Flu and COVID vaccines and boosters. Informed pt that they can take tylenol but should avoid other NSAIDs.       Plans/Needs: Routine f/u. Patient was released from the ER this morning, see that note. His BP is elevated in clinic, was started on Valsartan 40mg BID from the ER. Will RTC in 6 weeks.      Hurricane Season: No

## 2023-04-20 NOTE — PROGRESS NOTES
Per d/c note: Hospital Course:   Admitted for observation for atypical chest pain/headache/blurry vision. Found to be mildy volume overloaded, responded to lasix. BP was high, therefore valsartan was initiated. INR theraupetic. CT brain without acute findings

## 2023-04-25 NOTE — PROGRESS NOTES
Spoke to Ms. Wright regarding the patient's discharge medication dosing. She verified a discharge warfarin dose of 6 mg every Wednesday, and 4.5 mg all other days. INR scheduled for 04.25.23.

## 2023-04-25 NOTE — PROGRESS NOTES
INR not at goal. Medications, chart, and patient findings reviewed. See calendar for adjustments to dose and follow up plan.      
Patient questioned and confirmed correct dosage . Appetite is well and has usually cucumbers /pickles intake. Reports no missed doses , nuts , greens , or etc.  Patient will redraw labs 10/31/22 at lvad appointments.    
Patient states [facility] rescheduled visit from 11/3 to 11/4; & also reports not able to get labs drawn at [facility] location until this evening; please advise.   
Floor

## 2023-04-25 NOTE — PROGRESS NOTES
I have been unable to reach this patient by phone. Left several voice messages and mychart messages .

## 2023-04-26 ENCOUNTER — TELEPHONE (OUTPATIENT)
Dept: TRANSPLANT | Facility: CLINIC | Age: 38
End: 2023-04-26
Payer: MEDICAID

## 2023-04-26 NOTE — TELEPHONE ENCOUNTER
Message  Received: Today  DARLENE Ramey RN; Michael Pedraza LCSW  Cc: Marielos Shetty LCSW; Shena Turpin, RIAN; Cally Feliz, RIAN; Inessa Sumit, RN  Caller: Unspecified (Today,  9:00 AM)  I spoke with Gina Walsh RN with [employer] [phone].  She advised that they needed an order from his MD in order for his Robyn to do the dressing changes.  She has the order now, and they will provide the supplies.     Shirley           Previous Messages       ----- Message -----   From: Cony Mahoney RN   Sent: 4/26/2023  10:04 AM CDT   To: Marielos Shetty LCSW, Shirley Carvalho LCSW, *     ----- Message from Cony Mahoney RN sent at 4/26/2023 10:04 AM CDT -----   Hello,     We know there have been some problems and I doon't want them to drop him.  Would you mind looking into this issue and see how it can be resolved please?  Robyn called to tell us that [facility] is giving her a problem about giving her the supplies for his PICC line.  She said she has been changing his PICC dressing the entire time.  She mentioned something else about his medication.     Thank you,   Cony

## 2023-04-26 NOTE — TELEPHONE ENCOUNTER
Robyn called to tell us that [employer] is giving her a problem about giving her the supplies for his PICC line.  She said she has been changing his PICC dressing the entire time.  She mentioned something else about his medication.  I asked her is we can call her back after looking into this.  She can be reached at [phone].  Asking social workers to look into this and get back with Robyn.

## 2023-04-26 NOTE — TELEPHONE ENCOUNTER
TREY requested by Cony, [facility] coordinator, to investigate pt not receiving supplies for PICC line care which his significant other (Robyn) has been doing at home.     I spoke with Gina Walsh RN with [employer] [phone].    Gina advised that since they did not have an order for pt to perform PICC care at home, Franki needed to perform this task.   Gina advised that she did obtain an order from his MD in order for his Robyn to do the dressing changes.  She has the order now, and they will provide the supplies.    TREY called Robyn and provided update to her on status.  Robyn voiced understanding and agreement.     Updated team.

## 2023-05-01 NOTE — PROGRESS NOTES
4/25 Labwork sample was insufficient ;Patient is scheduled to redraw labs 5/2/23 - per aimee from  [facility] ph [phone]

## 2023-05-04 NOTE — PROGRESS NOTES
Lab no show 5/2; patient is currently out of town had r/s appoinment to 5/5/23  -  per [employer] .

## 2023-05-05 LAB
EXT ALBUMIN: 4.2
EXT ALT: 26
EXT AST: 28
EXT BILIRUBIN TOTAL: 0.6
EXT BNP: 416
EXT BUN: 10
EXT CALCIUM: 9.2
EXT CHLORIDE: 104
EXT CREATININE: 1.21 MG/DL
EXT GLUCOSE: 215
EXT HEMATOCRIT: 38.3
EXT HEMOGLOBIN: 12.2
EXT MAGNESIUM: 1.8
EXT PLATELETS: 248
EXT POTASSIUM: 5.1
EXT PROTEIN TOTAL: 7.4
EXT SODIUM: 139 MMOL/L
EXT WBC: 8.3

## 2023-05-11 ENCOUNTER — TELEPHONE (OUTPATIENT)
Dept: TRANSPLANT | Facility: CLINIC | Age: 38
End: 2023-05-11
Payer: MEDICAID

## 2023-05-12 LAB — INR PPP: 2.1

## 2023-05-15 ENCOUNTER — ANTI-COAG VISIT (OUTPATIENT)
Dept: CARDIOLOGY | Facility: CLINIC | Age: 38
End: 2023-05-15
Payer: MEDICAID

## 2023-05-15 DIAGNOSIS — Z95.811 LVAD (LEFT VENTRICULAR ASSIST DEVICE) PRESENT: Primary | ICD-10-CM

## 2023-05-15 PROCEDURE — 93793 ANTICOAG MGMT PT WARFARIN: CPT | Mod: ,,,

## 2023-05-15 PROCEDURE — 93793 PR ANTICOAGULANT MGMT FOR PT TAKING WARFARIN: ICD-10-PCS | Mod: ,,,

## 2023-05-16 LAB
EXT ALBUMIN: 4.3
EXT ALT: 17
EXT AST: 24
EXT BILIRUBIN TOTAL: 0.5
EXT BNP: 118
EXT BUN: 10
EXT CALCIUM: 9.1
EXT CHLORIDE: 104
EXT CREATININE: 1.35 MG/DL
EXT GLUCOSE: 124
EXT HEMATOCRIT: 43.2
EXT HEMOGLOBIN: 13.5
EXT MAGNESIUM: 1.8
EXT PLATELETS: 289
EXT POTASSIUM: 4.9
EXT PROTEIN TOTAL: 7.6
EXT SODIUM: 142 MMOL/L
EXT WBC: 14.9
INR PPP: 1.7

## 2023-05-16 NOTE — PROGRESS NOTES
Darlyn Wright  advised of dose instructions and verbalized understanding.  Will recheck labs 5/16/23 at [facility]. No recent changes reported.

## 2023-05-18 ENCOUNTER — ANTI-COAG VISIT (OUTPATIENT)
Dept: CARDIOLOGY | Facility: CLINIC | Age: 38
End: 2023-05-18
Payer: MEDICAID

## 2023-05-18 ENCOUNTER — PATIENT MESSAGE (OUTPATIENT)
Dept: CARDIOLOGY | Facility: CLINIC | Age: 38
End: 2023-05-18

## 2023-05-18 ENCOUNTER — TELEPHONE (OUTPATIENT)
Dept: TRANSPLANT | Facility: CLINIC | Age: 38
End: 2023-05-18
Payer: MEDICAID

## 2023-05-18 DIAGNOSIS — Z95.811 LVAD (LEFT VENTRICULAR ASSIST DEVICE) PRESENT: Primary | ICD-10-CM

## 2023-05-18 PROCEDURE — 93793 PR ANTICOAGULANT MGMT FOR PT TAKING WARFARIN: ICD-10-PCS | Mod: ,,,

## 2023-05-18 PROCEDURE — 93793 ANTICOAG MGMT PT WARFARIN: CPT | Mod: ,,,

## 2023-05-18 NOTE — PROGRESS NOTES
INR not at goal. Medications, chart, and patient findings reviewed. See calendar for adjustments to dose and follow up plan.     Wants to speak with provider first

## 2023-05-18 NOTE — PROGRESS NOTES
Spoke with patient regarding recent INR results .  Patient questioned and verified correct dosage .   Reports ''2 days without warfarin and yes I ate a little spinach but no bruising or bleeding I also ate a little broccoli and am I allowed to eat pecans and I cant I ate some '' - per patient .

## 2023-05-22 NOTE — SUBJECTIVE & OBJECTIVE
Interval HPI:     Overnight events: Patient had no acute events overnight. Remains on dobutamine drip for hypotension. Blood sugars down trending with insulin gtt, transitioned off drip overnight and started on subcutaneous insulin this morning.     Eatin%  Nausea: No  Hypoglycemia and intervention: No  Fever: No  TPN and/or TF: No  If yes, type of TF/TPN and rate: na    PMH, PSH, FH, SH updated and reviewed     ROS:as above    Current Medications and/or Treatments Impacting Glycemic Control  Immunotherapy:    Immunosuppressants       None          Steroids:   Hormones (From admission, onward)                None          Pressors:    Autonomic Drugs (From admission, onward)                None          Hyperglycemia/Diabetes Medications:   Antihyperglycemics (From admission, onward)                Start     Stop Route Frequency Ordered    22 1111  insulin aspart U-100 pen 1-10 Units         -- SubQ Before meals & nightly PRN 22 1013    22 1015  insulin detemir U-100 pen 15 Units         -- SubQ Daily 22 1013    22 1015  insulin aspart U-100 pen 4 Units         -- SubQ 3 times daily with meals 22 1013             PHYSICAL EXAMINATION:  Vitals:    22 1000   BP: 112/70   Pulse: (!) 115   Resp: (!) 23   Temp:      Body mass index is 25.82 kg/m².    Physical Exam  Vitals reviewed.   Constitutional:       General: He is not in acute distress.     Appearance: Normal appearance. He is not ill-appearing.   HENT:      Head: Normocephalic and atraumatic.   Eyes:      Extraocular Movements: Extraocular movements intact.      Pupils: Pupils are equal, round, and reactive to light.   Cardiovascular:      Rate and Rhythm: Normal rate and regular rhythm.   Pulmonary:      Effort: Pulmonary effort is normal. No respiratory distress.      Breath sounds: Normal breath sounds.   Abdominal:      General: Bowel sounds are normal. There is no distension.      Palpations: Abdomen is soft.  Subjective   Chief Complaint   Patient presents with   • Insomnia   • Stress   • Med Refill     Steven Todd Sturgeon is a 58 y.o. male.     Patient Care Team:  Phyllis Frost MD as PCP - General (Family Medicine)  Provider, No Known  Agustín Baer MD as Consulting Physician (Cardiology)     You have chosen to receive care through a telehealth visit.  Do you consent to use a video/audio connection for your medical care today? Yes      History of Present Illness  Patient is being evaluated today through telehealth medicine appointment via the video in view of COVID-19 pandemic.  Connection is being achieved through [employer].  We will can see and hear each other well.  Patient is in a car and I am in my office during this evaluation.  He has been under a lot of stress over the last month due to rather traumatic event his family went through in early April.  He has been dealing with insomnia and emotional symptoms throughout the day due to that traumatic event.  He is doing trauma counseling together with his wife and his youngest son.  In near future he plans to start doing rather personal counseling.  He was given prescription for trazodone 100 mg 1 month ago and he feels that medication is helping.  He is able to sleep through the night better.  He denies any medication side effects.  He wonders if occasionally he can take 1.5 pills.  He has not been back at work since 4/10/2023 due to the traumatic event his family went through.  He is a  and he is not ready to return back to work.  He will need the [facility] paperwork to be filled out.       The following portions of the patient's history were reviewed and updated as appropriate: allergies, current medications, past family history, past medical history, past social history, past surgical history and problem list.  Past Medical History:   Diagnosis Date   • Hyperlipidemia    • Vitamin B12 deficiency      Past Surgical History:       Tenderness: There is no abdominal tenderness.   Musculoskeletal:         General: No swelling or tenderness. Normal range of motion.      Right lower leg: No edema.      Left lower leg: No edema.   Skin:     General: Skin is warm and dry.   Neurological:      General: No focal deficit present.      Mental Status: He is alert and oriented to person, place, and time. Mental status is at baseline.   Psychiatric:         Mood and Affect: Mood normal.         Behavior: Behavior normal.        Procedure Laterality Date   • COLONOSCOPY      refusing; negative cologuard 6/4/2018   • TONSILLECTOMY     • VASECTOMY     • VOCAL CORD STRIPPING       The patient has a family history of  Family History   Problem Relation Age of Onset   • COPD Father    • Hypertension Father    • Hyperlipidemia Father    • Diabetes Father    • Heart disease Father    • Stroke Maternal Uncle    • Heart disease Paternal Uncle    • Heart disease Paternal Uncle      Social History     Socioeconomic History   • Marital status:    Tobacco Use   • Smoking status: Never   • Smokeless tobacco: Never   Substance and Sexual Activity   • Alcohol use: No   • Drug use: No   • Sexual activity: Defer       Review of Systems   Constitutional: Negative for activity change, appetite change and fatigue.   Gastrointestinal: Negative for diarrhea, nausea and vomiting.   Skin: Negative for dry skin, rash and skin lesions.   Psychiatric/Behavioral: Positive for sleep disturbance and stress. Negative for agitation, behavioral problems, decreased concentration, dysphoric mood, hallucinations, self-injury, suicidal ideas, negative for hyperactivity and depressed mood. The patient is not nervous/anxious.      There were no vitals taken for this visit.    BMI cannot be calculated due to outdated height or weight values.  Please input a current height/weight in Vitals and re-renter BMIFOLLOWUP in Note to pull in correct documentation based on BMI range.      Current Outpatient Medications:   •  traZODone (DESYREL) 100 MG tablet, Take 1 tablet by mouth Every Night., Disp: 90 tablet, Rfl: 1  •  sildenafil (REVATIO) 20 MG tablet, Take 3 tablets by mouth one hour prior to sex as needed. Do not take more than one dose every 24 hours., Disp: , Rfl:   •  vitamin B-12 (CYANOCOBALAMIN) 1000 MCG tablet, Take 1,000 mcg by mouth Daily., Disp: , Rfl:     Objective   Physical Exam  Constitutional:       General: He is not in acute distress.     Appearance: Normal  appearance. He is well-developed. He is not ill-appearing or diaphoretic.      Comments: Patient is in no distress, patient has normal voice and speech.  Normal respiratory effort.   HENT:      Head: Normocephalic and atraumatic.   Pulmonary:      Effort: Pulmonary effort is normal.   Musculoskeletal:      Cervical back: Normal range of motion and neck supple.   Neurological:      General: No focal deficit present.      Mental Status: He is alert and oriented to person, place, and time. Mental status is at baseline.   Psychiatric:         Mood and Affect: Mood normal.         Assessment & Plan   Diagnoses and all orders for this visit:    1. Adjustment insomnia (Primary)  -     traZODone (DESYREL) 100 MG tablet; Take 1 tablet by mouth Every Night.  Dispense: 90 tablet; Refill: 1    2. Stress      I reviewed his symptoms and concerns.  Patient has done very well since starting trazodone and he will continue the same.  He currently is on trazodone 100 mg and we discussed that he may take 1.5 pills if needed.  He will continue to see the counselor.  Questions were answered.  Follow-up with me for reevaluation in 2-3 months.    No follow-ups on file.    Requested Prescriptions     Signed Prescriptions Disp Refills   • traZODone (DESYREL) 100 MG tablet 90 tablet 1     Sig: Take 1 tablet by mouth Every Night.

## 2023-05-23 ENCOUNTER — PATIENT MESSAGE (OUTPATIENT)
Dept: TRANSPLANT | Facility: CLINIC | Age: 38
End: 2023-05-23
Payer: MEDICAID

## 2023-05-25 ENCOUNTER — PATIENT MESSAGE (OUTPATIENT)
Dept: CARDIOTHORACIC SURGERY | Facility: CLINIC | Age: 38
End: 2023-05-25
Payer: MEDICAID

## 2023-05-25 DIAGNOSIS — Z95.811 LVAD (LEFT VENTRICULAR ASSIST DEVICE) PRESENT: Primary | ICD-10-CM

## 2023-05-25 LAB — INR PPP: 1.9

## 2023-05-26 ENCOUNTER — PATIENT MESSAGE (OUTPATIENT)
Dept: CARDIOLOGY | Facility: CLINIC | Age: 38
End: 2023-05-26

## 2023-05-26 ENCOUNTER — ANTI-COAG VISIT (OUTPATIENT)
Dept: CARDIOLOGY | Facility: CLINIC | Age: 38
End: 2023-05-26
Payer: MEDICAID

## 2023-05-26 DIAGNOSIS — Z95.811 LVAD (LEFT VENTRICULAR ASSIST DEVICE) PRESENT: Primary | ICD-10-CM

## 2023-05-26 PROCEDURE — 93793 PR ANTICOAGULANT MGMT FOR PT TAKING WARFARIN: ICD-10-PCS | Mod: ,,,

## 2023-05-26 PROCEDURE — 93793 ANTICOAG MGMT PT WARFARIN: CPT | Mod: ,,,

## 2023-05-26 NOTE — PROGRESS NOTES
Spoke with patient regarding recent INR results .  Patient questioned and verified correct dosage .   Reported via [employer]  - 'No missed doses yes I did have some green leafy vegetables I cant lie I had small salad with spinach it was only a portion no bleeding but I did have a bruise on my neck it was a little tender for a few days but everything is back to normal .  Yes I do have a new medication cyclobenzaprine 7.5 tablets for muscle cramps when I take my furosemide . Per patient stated.

## 2023-05-27 ENCOUNTER — PATIENT MESSAGE (OUTPATIENT)
Dept: ADMINISTRATIVE | Facility: OTHER | Age: 38
End: 2023-05-27
Payer: MEDICAID

## 2023-06-02 ENCOUNTER — TELEPHONE (OUTPATIENT)
Dept: TRANSPLANT | Facility: CLINIC | Age: 38
End: 2023-06-02
Payer: MEDICAID

## 2023-06-02 ENCOUNTER — HOSPITAL ENCOUNTER (EMERGENCY)
Facility: HOSPITAL | Age: 38
End: 2023-06-02
Attending: STUDENT IN AN ORGANIZED HEALTH CARE EDUCATION/TRAINING PROGRAM
Payer: MEDICAID

## 2023-06-02 ENCOUNTER — HOSPITAL ENCOUNTER (INPATIENT)
Facility: HOSPITAL | Age: 38
LOS: 4 days | Discharge: HOME | DRG: 314 | End: 2023-06-06
Attending: INTERNAL MEDICINE | Admitting: INTERNAL MEDICINE
Payer: MEDICAID

## 2023-06-02 VITALS
RESPIRATION RATE: 22 BRPM | OXYGEN SATURATION: 96 % | BODY MASS INDEX: 27.75 KG/M2 | HEART RATE: 119 BPM | SYSTOLIC BLOOD PRESSURE: 118 MMHG | DIASTOLIC BLOOD PRESSURE: 88 MMHG | TEMPERATURE: 100 F | WEIGHT: 222 LBS

## 2023-06-02 DIAGNOSIS — U07.1 COVID-19: Primary | ICD-10-CM

## 2023-06-02 DIAGNOSIS — T82.9XXA LEFT VENTRICULAR ASSIST DEVICE (LVAD) COMPLICATION, INITIAL ENCOUNTER: ICD-10-CM

## 2023-06-02 DIAGNOSIS — A41.9 SEPSIS, DUE TO UNSPECIFIED ORGANISM, UNSPECIFIED WHETHER ACUTE ORGAN DYSFUNCTION PRESENT: ICD-10-CM

## 2023-06-02 DIAGNOSIS — E11.65 TYPE 2 DIABETES MELLITUS WITH HYPERGLYCEMIA, WITH LONG-TERM CURRENT USE OF INSULIN: ICD-10-CM

## 2023-06-02 DIAGNOSIS — Z79.4 TYPE 2 DIABETES MELLITUS WITH HYPERGLYCEMIA, WITH LONG-TERM CURRENT USE OF INSULIN: ICD-10-CM

## 2023-06-02 DIAGNOSIS — R50.9 FEVER, UNSPECIFIED FEVER CAUSE: ICD-10-CM

## 2023-06-02 DIAGNOSIS — A41.9 SEPSIS, DUE TO UNSPECIFIED ORGANISM, UNSPECIFIED WHETHER ACUTE ORGAN DYSFUNCTION PRESENT: Primary | ICD-10-CM

## 2023-06-02 DIAGNOSIS — Z95.811 LVAD (LEFT VENTRICULAR ASSIST DEVICE) PRESENT: ICD-10-CM

## 2023-06-02 DIAGNOSIS — A41.9 SEPSIS: ICD-10-CM

## 2023-06-02 DIAGNOSIS — U07.1 COVID-19 VIRUS DETECTED: ICD-10-CM

## 2023-06-02 LAB
ALBUMIN SERPL-MCNC: 4 G/DL (ref 3.5–5)
ALBUMIN/GLOB SERPL: 1 RATIO (ref 1.1–2)
ALP SERPL-CCNC: 101 UNIT/L (ref 40–150)
ALT SERPL-CCNC: 10 UNIT/L (ref 0–55)
APPEARANCE UR: CLEAR
AST SERPL-CCNC: 22 UNIT/L (ref 5–34)
BACTERIA #/AREA URNS AUTO: NORMAL /HPF
BASOPHILS # BLD AUTO: 0.04 X10(3)/MCL
BASOPHILS NFR BLD AUTO: 0.3 %
BILIRUB UR QL STRIP.AUTO: NEGATIVE MG/DL
BILIRUBIN DIRECT+TOT PNL SERPL-MCNC: 1 MG/DL
BUN SERPL-MCNC: 7.2 MG/DL (ref 8.9–20.6)
CALCIUM SERPL-MCNC: 8.9 MG/DL (ref 8.4–10.2)
CHLORIDE SERPL-SCNC: 101 MMOL/L (ref 98–107)
CO2 SERPL-SCNC: 21 MMOL/L (ref 22–29)
COLOR UR: YELLOW
CREAT SERPL-MCNC: 1.65 MG/DL (ref 0.73–1.18)
EOSINOPHIL # BLD AUTO: 0 X10(3)/MCL (ref 0–0.9)
EOSINOPHIL NFR BLD AUTO: 0 %
ERYTHROCYTE [DISTWIDTH] IN BLOOD BY AUTOMATED COUNT: 21.8 % (ref 11.5–17)
FLUAV AG UPPER RESP QL IA.RAPID: NOT DETECTED
FLUBV AG UPPER RESP QL IA.RAPID: NOT DETECTED
GFR SERPLBLD CREATININE-BSD FMLA CKD-EPI: 54 MLS/MIN/1.73/M2
GLOBULIN SER-MCNC: 4.2 GM/DL (ref 2.4–3.5)
GLUCOSE SERPL-MCNC: 186 MG/DL (ref 74–100)
GLUCOSE UR QL STRIP.AUTO: NEGATIVE MG/DL
HCT VFR BLD AUTO: 41.2 % (ref 42–52)
HGB BLD-MCNC: 13.3 G/DL (ref 14–18)
IMM GRANULOCYTES # BLD AUTO: 0.05 X10(3)/MCL (ref 0–0.04)
IMM GRANULOCYTES NFR BLD AUTO: 0.4 %
KETONES UR QL STRIP.AUTO: NEGATIVE MG/DL
LACTATE SERPL-SCNC: 1.9 MMOL/L (ref 0.5–2.2)
LEUKOCYTE ESTERASE UR QL STRIP.AUTO: NEGATIVE UNIT/L
LIPASE SERPL-CCNC: 15 U/L
LYMPHOCYTES # BLD AUTO: 1.38 X10(3)/MCL (ref 0.6–4.6)
LYMPHOCYTES NFR BLD AUTO: 10.6 %
MCH RBC QN AUTO: 25.1 PG (ref 27–31)
MCHC RBC AUTO-ENTMCNC: 32.3 G/DL (ref 33–36)
MCV RBC AUTO: 77.9 FL (ref 80–94)
MONOCYTES # BLD AUTO: 1.16 X10(3)/MCL (ref 0.1–1.3)
MONOCYTES NFR BLD AUTO: 8.9 %
NEUTROPHILS # BLD AUTO: 10.43 X10(3)/MCL (ref 2.1–9.2)
NEUTROPHILS NFR BLD AUTO: 79.8 %
NITRITE UR QL STRIP.AUTO: NEGATIVE
NRBC BLD AUTO-RTO: 0 %
PH UR STRIP.AUTO: 7 [PH]
PLATELET # BLD AUTO: 238 X10(3)/MCL (ref 130–400)
PMV BLD AUTO: 9.9 FL (ref 7.4–10.4)
POCT GLUCOSE: 144 MG/DL (ref 70–110)
POTASSIUM SERPL-SCNC: 4.1 MMOL/L (ref 3.5–5.1)
PROT SERPL-MCNC: 8.2 GM/DL (ref 6.4–8.3)
PROT UR QL STRIP.AUTO: ABNORMAL MG/DL
RBC # BLD AUTO: 5.29 X10(6)/MCL (ref 4.7–6.1)
RBC #/AREA URNS AUTO: <5 /HPF
RBC UR QL AUTO: ABNORMAL UNIT/L
SARS-COV-2 RNA RESP QL NAA+PROBE: DETECTED
SODIUM SERPL-SCNC: 132 MMOL/L (ref 136–145)
SP GR UR STRIP.AUTO: 1 (ref 1–1.03)
SQUAMOUS #/AREA URNS AUTO: <5 /HPF
UROBILINOGEN UR STRIP-ACNC: 1 MG/DL
WBC # SPEC AUTO: 13.06 X10(3)/MCL (ref 4.5–11.5)
WBC #/AREA URNS AUTO: <5 /HPF

## 2023-06-02 PROCEDURE — 25000003 PHARM REV CODE 250: Performed by: NURSE PRACTITIONER

## 2023-06-02 PROCEDURE — 25500020 PHARM REV CODE 255: Performed by: STUDENT IN AN ORGANIZED HEALTH CARE EDUCATION/TRAINING PROGRAM

## 2023-06-02 PROCEDURE — 20600001 HC STEP DOWN PRIVATE ROOM

## 2023-06-02 PROCEDURE — 63600175 PHARM REV CODE 636 W HCPCS: Performed by: STUDENT IN AN ORGANIZED HEALTH CARE EDUCATION/TRAINING PROGRAM

## 2023-06-02 PROCEDURE — 81001 URINALYSIS AUTO W/SCOPE: CPT | Performed by: NURSE PRACTITIONER

## 2023-06-02 PROCEDURE — 85025 COMPLETE CBC W/AUTO DIFF WBC: CPT | Performed by: NURSE PRACTITIONER

## 2023-06-02 PROCEDURE — 80053 COMPREHEN METABOLIC PANEL: CPT | Performed by: NURSE PRACTITIONER

## 2023-06-02 PROCEDURE — 0240U COVID/FLU A&B PCR: CPT | Performed by: NURSE PRACTITIONER

## 2023-06-02 PROCEDURE — 87040 BLOOD CULTURE FOR BACTERIA: CPT | Performed by: NURSE PRACTITIONER

## 2023-06-02 PROCEDURE — 27000248 HC VAD-ADDITIONAL DAY

## 2023-06-02 PROCEDURE — 87040 BLOOD CULTURE FOR BACTERIA: CPT | Performed by: STUDENT IN AN ORGANIZED HEALTH CARE EDUCATION/TRAINING PROGRAM

## 2023-06-02 PROCEDURE — 99285 EMERGENCY DEPT VISIT HI MDM: CPT | Mod: 25

## 2023-06-02 PROCEDURE — 96361 HYDRATE IV INFUSION ADD-ON: CPT

## 2023-06-02 PROCEDURE — 83605 ASSAY OF LACTIC ACID: CPT | Performed by: NURSE PRACTITIONER

## 2023-06-02 PROCEDURE — 96365 THER/PROPH/DIAG IV INF INIT: CPT

## 2023-06-02 PROCEDURE — 25000003 PHARM REV CODE 250: Performed by: STUDENT IN AN ORGANIZED HEALTH CARE EDUCATION/TRAINING PROGRAM

## 2023-06-02 PROCEDURE — 83690 ASSAY OF LIPASE: CPT | Performed by: NURSE PRACTITIONER

## 2023-06-02 RX ORDER — DEXTROSE 40 %
15 GEL (GRAM) ORAL
Status: DISCONTINUED | OUTPATIENT
Start: 2023-06-03 | End: 2023-06-02

## 2023-06-02 RX ORDER — GABAPENTIN 100 MG/1
100 CAPSULE ORAL 3 TIMES DAILY
Status: DISCONTINUED | OUTPATIENT
Start: 2023-06-03 | End: 2023-06-04

## 2023-06-02 RX ORDER — SODIUM CHLORIDE 0.9 % (FLUSH) 0.9 %
10 SYRINGE (ML) INJECTION
Status: DISCONTINUED | OUTPATIENT
Start: 2023-06-02 | End: 2023-06-07 | Stop reason: HOSPADM

## 2023-06-02 RX ORDER — DEXTROSE 40 %
30 GEL (GRAM) ORAL
Status: DISCONTINUED | OUTPATIENT
Start: 2023-06-03 | End: 2023-06-07 | Stop reason: HOSPADM

## 2023-06-02 RX ORDER — CYCLOBENZAPRINE HCL 5 MG
5 TABLET ORAL 3 TIMES DAILY PRN
Status: DISCONTINUED | OUTPATIENT
Start: 2023-06-03 | End: 2023-06-07 | Stop reason: HOSPADM

## 2023-06-02 RX ORDER — INSULIN ASPART 100 [IU]/ML
0-5 INJECTION, SOLUTION INTRAVENOUS; SUBCUTANEOUS
Status: DISCONTINUED | OUTPATIENT
Start: 2023-06-03 | End: 2023-06-03

## 2023-06-02 RX ORDER — ALBUTEROL SULFATE 90 UG/1
2 AEROSOL, METERED RESPIRATORY (INHALATION) EVERY 6 HOURS
Status: DISCONTINUED | OUTPATIENT
Start: 2023-06-03 | End: 2023-06-07 | Stop reason: HOSPADM

## 2023-06-02 RX ORDER — SPIRONOLACTONE 25 MG/1
25 TABLET ORAL DAILY
Status: DISCONTINUED | OUTPATIENT
Start: 2023-06-03 | End: 2023-06-07 | Stop reason: HOSPADM

## 2023-06-02 RX ORDER — VALSARTAN 40 MG/1
40 TABLET ORAL 2 TIMES DAILY
Status: DISCONTINUED | OUTPATIENT
Start: 2023-06-02 | End: 2023-06-07 | Stop reason: HOSPADM

## 2023-06-02 RX ORDER — DEXTROSE 40 %
30 GEL (GRAM) ORAL
Status: DISCONTINUED | OUTPATIENT
Start: 2023-06-03 | End: 2023-06-02

## 2023-06-02 RX ORDER — WARFARIN 4 MG/1
4 TABLET ORAL DAILY
Status: DISCONTINUED | OUTPATIENT
Start: 2023-06-03 | End: 2023-06-04

## 2023-06-02 RX ORDER — MILRINONE LACTATE 0.2 MG/ML
0.25 INJECTION, SOLUTION INTRAVENOUS CONTINUOUS
Status: DISCONTINUED | OUTPATIENT
Start: 2023-06-02 | End: 2023-06-07 | Stop reason: HOSPADM

## 2023-06-02 RX ORDER — SODIUM CHLORIDE 9 MG/ML
500 INJECTION, SOLUTION INTRAVENOUS
Status: COMPLETED | OUTPATIENT
Start: 2023-06-02 | End: 2023-06-02

## 2023-06-02 RX ORDER — MIRTAZAPINE 15 MG/1
15 TABLET, FILM COATED ORAL NIGHTLY
Status: DISCONTINUED | OUTPATIENT
Start: 2023-06-02 | End: 2023-06-07 | Stop reason: HOSPADM

## 2023-06-02 RX ORDER — FAMOTIDINE 20 MG/1
40 TABLET, FILM COATED ORAL DAILY
Status: DISCONTINUED | OUTPATIENT
Start: 2023-06-03 | End: 2023-06-07 | Stop reason: HOSPADM

## 2023-06-02 RX ORDER — DEXTROSE 40 %
15 GEL (GRAM) ORAL
Status: DISCONTINUED | OUTPATIENT
Start: 2023-06-03 | End: 2023-06-07 | Stop reason: HOSPADM

## 2023-06-02 RX ORDER — LEVETIRACETAM 500 MG/1
1000 TABLET ORAL 2 TIMES DAILY
Status: DISCONTINUED | OUTPATIENT
Start: 2023-06-02 | End: 2023-06-07 | Stop reason: HOSPADM

## 2023-06-02 RX ORDER — ACETAMINOPHEN 500 MG
1000 TABLET ORAL
Status: COMPLETED | OUTPATIENT
Start: 2023-06-02 | End: 2023-06-02

## 2023-06-02 RX ORDER — ASCORBIC ACID 500 MG
500 TABLET ORAL 2 TIMES DAILY
Status: DISCONTINUED | OUTPATIENT
Start: 2023-06-03 | End: 2023-06-07 | Stop reason: HOSPADM

## 2023-06-02 RX ORDER — GLUCAGON 1 MG
1 KIT INJECTION
Status: DISCONTINUED | OUTPATIENT
Start: 2023-06-03 | End: 2023-06-07 | Stop reason: HOSPADM

## 2023-06-02 RX ADMIN — INSULIN DETEMIR 15 UNITS: 100 INJECTION, SOLUTION SUBCUTANEOUS at 11:06

## 2023-06-02 RX ADMIN — VALSARTAN 40 MG: 40 TABLET, FILM COATED ORAL at 11:06

## 2023-06-02 RX ADMIN — LEVETIRACETAM 1000 MG: 500 TABLET, FILM COATED ORAL at 11:06

## 2023-06-02 RX ADMIN — IOPAMIDOL 100 ML: 755 INJECTION, SOLUTION INTRAVENOUS at 07:06

## 2023-06-02 RX ADMIN — ACETAMINOPHEN 1000 MG: 500 TABLET ORAL at 04:06

## 2023-06-02 RX ADMIN — PIPERACILLIN AND TAZOBACTAM 4.5 G: 4; .5 INJECTION, POWDER, LYOPHILIZED, FOR SOLUTION INTRAVENOUS; PARENTERAL at 05:06

## 2023-06-02 RX ADMIN — MIRTAZAPINE 15 MG: 15 TABLET, FILM COATED ORAL at 11:06

## 2023-06-02 RX ADMIN — SODIUM CHLORIDE 500 ML: 9 INJECTION, SOLUTION INTRAVENOUS at 05:06

## 2023-06-02 RX ADMIN — VANCOMYCIN HYDROCHLORIDE 2000 MG: 10 INJECTION, POWDER, LYOPHILIZED, FOR SOLUTION INTRAVENOUS at 05:06

## 2023-06-02 NOTE — ED PROVIDER NOTES
Encounter Date: 6/2/2023    SCRIBE #1 NOTE: I, Analisa House, am scribing for, and in the presence of,  Austin Anders MD. I have scribed the following portions of the note - Other sections scribed: HPI, ROS, PE, MDM.     History     Chief Complaint   Patient presents with    LVAD site possible infection      Pt had Lvad placed in June. 2 days ago pt reports drainage at site. Abd distended and tender to touch. +cough, +fever, dizziness. Denies chest pain.     Patient is a male with a history of cardiomyopathy, CHF, DM II, and renal disorder presents to the ED for possible LVAD site infection. Pt reports having fever all week (101 F at highest) and drainage for 2 days. Denies N/V, cough, nasal congestion.  States that spouse also had fever and flu-like symptoms for the past week. LVAD inserted 6/30/22. Currently on Coumadin. Pt also has ICD in place.  Patient denies any previous drainage from LVAD site.    The history is provided by the patient. No  was used.       Review of patient's allergies indicates:   Allergen Reactions    Aspirin Other (See Comments)     Mr. Thacker is enrolled in Dr. Paige's Alon Trial and cannot have any aspirin and/or aspirin-containing products. DO NOT cancel any orders for INV Aspirin 100 mg/Placebo. If you have questions, please contact Isabel @ [phone], [phone],[email or web address], secure chat or MS Teams message.    Bumex [bumetanide] Hives    Lactose Diarrhea     Other reaction(s): Abdominal distension, gaseous    Torsemide Hives     Past Medical History:   Diagnosis Date    Arthritis     Cardiomyopathy     CHF (congestive heart failure) 10/01/2020    Diabetes mellitus     Dilated cardiomyopathy 10/26/2020    Drug abuse 10/2020    Hyperosmolar hyperglycemic state (HHS) 5/25/2022    ICD (implantable cardioverter-defibrillator) in place 10/26/2020    Muscle cramping 6/15/2022    Renal disorder      Past Surgical History:   Procedure Laterality Date     APPLICATION OF WOUND VACUUM-ASSISTED CLOSURE DEVICE N/A 6/30/2022    Procedure: APPLICATION, WOUND VAC;  Surgeon: Luis F Paige MD;  Location: [facility] OR 2ND FLR;  Service: Cardiovascular;  Laterality: N/A;  50 x 5 cm    CARDIAC DEFIBRILLATOR PLACEMENT      IMPLANTATION OF RIGHT VENTRICULAR ASSIST DEVICE (RVAD) N/A 6/29/2022    Procedure: INSERTION, RVAD;  Surgeon: Luis F Paige MD;  Location: [facility] OR 2ND FLR;  Service: Cardiovascular;  Laterality: N/A;    INSERTION OF GRAFT TO PERICARDIUM Right 6/30/2022    Procedure: INSERTION-RIGHT VENTRICULAR ASSIST DEVICE;  Surgeon: Luis F aPige MD;  Location: [facility] OR 2ND FLR;  Service: Cardiovascular;  Laterality: Right;    IRRIGATION OF MEDIASTINUM  6/30/2022    Procedure: IRRIGATION, MEDIASTINUM;  Surgeon: Luis F Paige MD;  Location: [facility] OR 2ND FLR;  Service: Cardiovascular;;    LEFT VENTRICULAR ASSIST DEVICE Left 6/23/2022    Procedure: INSERTION-LEFT VENTRICULAR ASSIST DEVICE;  Surgeon: Luis F Paige MD;  Location: [facility] OR 2ND FLR;  Service: Cardiovascular;  Laterality: Left;    LEFT VENTRICULAR ASSIST DEVICE N/A 6/29/2022    Procedure: INSERTION-LEFT VENTRICULAR ASSIST DEVICE;  Surgeon: Luis F Paige MD;  Location: [facility] OR 2ND FLR;  Service: Cardiovascular;  Laterality: N/A;    RIGHT HEART CATHETERIZATION Right 4/8/2022    Procedure: INSERTION, CATHETER, RIGHT HEART;  Surgeon: Luca Lopez Jr., MD;  Location: [facility] CATH LAB;  Service: Cardiology;  Laterality: Right;    RIGHT HEART CATHETERIZATION Right 4/19/2022    Procedure: INSERTION, CATHETER, RIGHT HEART;  Surgeon: Josh Pulido MD;  Location: [facility] CATH LAB;  Service: Cardiology;  Laterality: Right;    RIGHT HEART CATHETERIZATION Right 7/21/2022    Procedure: INSERTION, CATHETER, RIGHT HEART;  Surgeon: Dalia Crum MD;  Location: [facility] CATH LAB;  Service: Cardiology;  Laterality: Right;    RIGHT HEART CATHETERIZATION Right 10/31/2022    Procedure: INSERTION, CATHETER, RIGHT HEART;  Surgeon:  Dalia Crum MD;  Location: [facility] CATH LAB;  Service: Cardiology;  Laterality: Right;    STERNAL WOUND CLOSURE N/A 2022    Procedure: CLOSURE, WOUND, STERNUM;  Surgeon: Luis F Paige MD;  Location: [facility] OR [address] [place] [address];  Service: Cardiovascular;  Laterality: N/A;     Family History   Problem Relation Age of Onset    Heart failure Father     Diverticulosis Brother     Heart attack Maternal Grandmother     Heart attack Maternal Grandfather      Social History     Tobacco Use    Smoking status: Former     Packs/day: 0.50     Years: 16.00     Pack years: 8.00     Types: Cigarettes     Start date: 10/1/2004     Quit date: 2021     Years since quittin.1    Smokeless tobacco: Never   Substance Use Topics    Alcohol use: Not Currently    Drug use: Not Currently     Types: Marijuana, MDMA (Ecstacy)     Review of Systems   Constitutional:  Positive for fever.   HENT:  Negative for sore throat.    Eyes:  Negative for visual disturbance.   Respiratory:  Negative for shortness of breath.    Cardiovascular:  Negative for chest pain.   Gastrointestinal:  Negative for abdominal pain.   Genitourinary:  Negative for dysuria.   Musculoskeletal:  Negative for joint swelling.   Skin:  Negative for rash.        Drainage at LVAD site.   Neurological:  Negative for weakness.   Psychiatric/Behavioral:  Negative for confusion.    All other systems reviewed and are negative.    Physical Exam     Initial Vitals [23 1613]   BP Pulse Resp Temp SpO2   (!) 99/58 97 18 99 °F (37.2 °C) 100 %      MAP       --         Physical Exam    Nursing note and vitals reviewed.  Constitutional: He appears well-developed and well-nourished. He is not diaphoretic. No distress.   HENT:   Head: Normocephalic and atraumatic.   Nose: Nose normal.   Mouth/Throat: Oropharynx is clear and moist. No oropharyngeal exudate.   Eyes: Conjunctivae and EOM are normal. Pupils are equal, round, and reactive to light.   Neck:   Normal range of  motion.  Cardiovascular:  Normal rate, regular rhythm and intact distal pulses.         The patient has a device (subcutaneous AICD) in the left chest.   Mechanical hum.   Pulmonary/Chest: Breath sounds normal. No respiratory distress. He has no wheezes. He has no rales.   Abdominal: Abdomen is soft. He exhibits no distension. There is no abdominal tenderness.   Ventricular assist device abdominal driveline to R abdomen.   Musculoskeletal:         General: No tenderness or edema. Normal range of motion.      Cervical back: Normal range of motion.      Comments: RUE PICC line.     Neurological: He is alert and oriented to person, place, and time. No cranial nerve deficit.   Skin: Skin is warm and dry. Capillary refill takes less than 2 seconds. No rash noted. No erythema.   Psychiatric: He has a normal mood and affect.                 ED Course   Critical Care    Date/Time: 6/2/2023 6:00 PM  Performed by: Austin Anders MD  Authorized by: Austin Anders MD   Direct patient critical care time: 12 minutes  Additional history critical care time: 8 minutes  Ordering / reviewing critical care time: 6 minutes  Documentation critical care time: 5 minutes  Consulting other physicians critical care time: 15 minutes  Total critical care time (exclusive of procedural time) : 46 minutes  Critical care time was exclusive of separately billable procedures and treating other patients and teaching time.  Critical care was necessary to treat or prevent imminent or life-threatening deterioration of the following conditions: sepsis and metabolic crisis.  Critical care was time spent personally by me on the following activities: development of treatment plan with patient or surrogate, discussions with consultants, interpretation of cardiac output measurements, evaluation of patient's response to treatment, examination of patient, obtaining history from patient or surrogate, ordering and performing treatments and interventions,  ordering and review of laboratory studies, ordering and review of radiographic studies, pulse oximetry, re-evaluation of patient's condition and review of old charts.      Labs Reviewed   CBC WITH DIFFERENTIAL - Abnormal; Notable for the following components:       Result Value    WBC 13.06 (*)     Hgb 13.3 (*)     Hct 41.2 (*)     MCV 77.9 (*)     MCH 25.1 (*)     MCHC 32.3 (*)     RDW 21.8 (*)     Neut # 10.43 (*)     IG# 0.05 (*)     All other components within normal limits   COMPREHENSIVE METABOLIC PANEL - Abnormal; Notable for the following components:    Sodium Level 132 (*)     Carbon Dioxide 21 (*)     Glucose Level 186 (*)     Blood Urea Nitrogen 7.2 (*)     Creatinine 1.65 (*)     Globulin 4.2 (*)     Albumin/Globulin Ratio 1.0 (*)     All other components within normal limits   COVID/FLU A&B PCR - Abnormal; Notable for the following components:    SARS-CoV-2 PCR Detected (*)     All other components within normal limits    Narrative:     The Xpert Xpress SARS-CoV-2/FLU/RSV plus is a rapid, multiplexed real-time PCR test intended for the simultaneous qualitative detection and differentiation of SARS-CoV-2, Influenza A, Influenza B, and respiratory syncytial virus (RSV) viral RNA in either nasopharyngeal swab or nasal swab specimens.         URINALYSIS, REFLEX TO URINE CULTURE - Abnormal; Notable for the following components:    Specific Gravity, UA 1.004 (*)     Protein, UA 2+ (*)     Blood, UA Trace (*)     All other components within normal limits   LACTIC ACID, PLASMA - Normal   LIPASE - Normal   URINALYSIS, MICROSCOPIC - Normal   BLOOD CULTURE [facility]   BLOOD CULTURE [facility]   BLOOD CULTURE [facility]          Imaging Results              CT Abdomen Pelvis With Contrast (In process)  Result time 06/02/23 20:19:48                     X-Ray Chest 1 View (Final result)  Result time 06/02/23 16:43:42      Final result by Varun Armendariz MD (06/02/23 16:43:42)                   Impression:      No acute pulmonary  process identified.      Electronically signed by: Varun Armendariz  Date:    06/02/2023  Time:    16:43               Narrative:    EXAMINATION:  XR CHEST 1 VIEW    CLINICAL HISTORY:  fever;    TECHNIQUE:  Frontal view(s) of the chest.    COMPARISON:  Radiography 04/18/2023    FINDINGS:  Right PICC tip over the lower SVC.  Stable cardiac silhouette.  The lungs are well-inflated.  No consolidation identified.  No significant pleural effusion or discernible pneumothorax.                                     Medical Decision Making  Problems Addressed:  Fever, unspecified fever cause: acute illness or injury that poses a threat to life or bodily functions  LVAD (left ventricular assist device) present: acute illness or injury that poses a threat to life or bodily functions  Sepsis, due to unspecified organism, unspecified whether acute organ dysfunction present: acute illness or injury    Amount and/or Complexity of Data Reviewed  External Data Reviewed: labs, radiology, ECG and notes.  Labs: ordered.  Radiology: ordered and independent interpretation performed.         Medications   acetaminophen tablet 1,000 mg (1,000 mg Oral Given 6/2/23 1630)   0.9%  NaCl infusion (0 mLs Intravenous Stopped 6/2/23 2005)   vancomycin 2 g in dextrose 5 % 500 mL IVPB (2,000 mg Intravenous New Bag 6/2/23 1755)   piperacillin-tazobactam (ZOSYN) 4.5 g in dextrose 5 % in water (D5W) 5 % 100 mL IVPB (MB+) (0 g Intravenous Stopped 6/2/23 1802)   iopamidoL (ISOVUE-370) injection 100 mL (100 mLs Intravenous Given 6/2/23 1945)     Medical Decision Making:   History:   I obtained history from: someone other than patient.       <> Summary of History: Collateral history obtained from the family member at bedside.      Collateral history obtained from patient's transplant coordinator.  Old Medical Records: I decided to obtain old medical records.  Old Records Summarized: records from clinic visits and records from previous admission(s).       <>  Summary of Records: Reviewed previous records, history of LVAD.  Cardiomyopathy.  Initial Assessment:   LVAD  Differential Diagnosis:   Judging by the patient's chief complaint and pertinent history, the patient has the following possible differential diagnoses, including but not limited to the following.  Some of these are deemed to be lower likelihood and some more likely based on my physical exam and history combined with possible lab work and/or imaging studies.   Please see the pertinent studies, and refer to the HPI.  Some of these diagnoses will take further evaluation to fully rule out, perhaps as an outpatient and the patient was encouraged to follow up when discharged for more comprehensive evaluation.    Viral syndrome, COVID, flu, otitis media UTI, intraabdominal infection, bacterial pharyngitis, pneumonia, sepsis, pyelonephritis, cellulitis, abscess, ENT infection  Independently Interpreted Test(s):   I have ordered and independently interpreted X-rays - see prior notes.  Clinical Tests:   Lab Tests: Ordered and Reviewed  Radiological Study: Ordered and Reviewed  ED Management:  Patient is a 38-year-old male presents to the emergency department fever, tachycardia, drainage from driveline insertion site.  See HPI.  See physical exam.  Imaging of drainage placed in patient's chart.  See physical exam is noted above.  Blood cultures obtained.  Imaging obtained but no obvious pocket of infection noted.  Patient denies any previous drainage from insertion site.  Of note patient's wife has had URI symptoms.  Patient denies any cough, nasal congestion currently.  Chest x-ray without any acute infiltrates.  Found to be COVID-19 positive.  No shortness of breath or accident hypoxia currently.  Fever may be due to COVID-19 however given drainage from LVAD insertion site will transfer to [place].  Results discussed with patient answered all questions at this time.  Verbalized understanding agreed to plan.   Patient has been accepted at [facility].        Scribe Attestation:   Scribe #1: I performed the above scribed service and the documentation accurately describes the services I performed. I attest to the accuracy of the note.    Attending Attestation:           Physician Attestation for Scribe:  Physician Attestation Statement for Scribe #1: I, Austin Anders MD, reviewed documentation, as scribed by Analisa House in my presence, and it is both accurate and complete.           ED Course as of 06/02/23 2021 Fri Jun 02, 2023 1710 LVAD transplant coordinator of [facility] paged. [AS]   [id number] Discussed with Dr. Yang.  Will accept patient.  Admitted to [facility].  [RP]   0634 Updated [facility], Transplant Coordinator about positive COVID test. [RP]      ED Course User Index  [AS] Etelvina House  [RP] Austin Anders MD                 Clinical Impression:   Final diagnoses:  [A41.9] Sepsis, due to unspecified organism, unspecified whether acute organ dysfunction present (Primary)  [Z95.811] LVAD (left ventricular assist device) present  [R50.9] Fever, unspecified fever cause        ED Disposition Condition    Transfer to Another Facility Stable                Austin Anders MD  06/02/23 2024       Austin Anders MD  06/23/23 7149     Statement Selected

## 2023-06-02 NOTE — TELEPHONE ENCOUNTER
Received call from patients wife reporting that she was in the ER with the patient they had been having cold like symptoms and family members that were sick and now they have started feeling poorly. They also reported new drainage at DLES.    ER MD paged shortly after reporting similar symptoms with correlating lab results. MD Obtained cultures, labs and will attempt to obtain imaging. MD initiating transfer and also took photo of DLES, see below. On call MD Yang notified of impending transfer.

## 2023-06-02 NOTE — FIRST PROVIDER EVALUATION
Medical screening examination initiated.  I have conducted a focused provider triage encounter, findings are as follows:    Brief history of present illness:  39 y/o male who presents with fever and cough for 4 days, has LVAD as of 6/30/22 and having upper abdominal pain over site. Febrile in triage.     Vitals:    06/02/23 1613 06/02/23 1614   BP: (!) 99/58    Pulse: 97    Resp: 18    Temp: 99 °F (37.2 °C) (!) 101.6 °F (38.7 °C)   TempSrc: Oral Oral   SpO2: 100%    Weight: 100.7 kg (222 lb)        Pertinent physical exam:  alert, nonlabored, in wheel chair, very tender to abdomen.     Brief workup plan:  swab, labs, imaging    Preliminary workup initiated; this workup will be continued and followed by the physician or advanced practice provider that is assigned to the patient when roomed.

## 2023-06-03 PROBLEM — A41.9 SEPSIS: Status: ACTIVE | Noted: 2023-06-03

## 2023-06-03 PROBLEM — A41.9 SEPSIS: Status: RESOLVED | Noted: 2023-06-03 | Resolved: 2023-06-03

## 2023-06-03 PROBLEM — U07.1 COVID-19: Status: ACTIVE | Noted: 2023-06-03

## 2023-06-03 LAB
AMPHET+METHAMPHET UR QL: NEGATIVE
ANION GAP SERPL CALC-SCNC: 11 MMOL/L (ref 8–16)
APTT PPP: 36.6 SEC (ref 21–32)
APTT PPP: 37.5 SEC (ref 21–32)
BARBITURATES UR QL SCN>200 NG/ML: NEGATIVE
BASOPHILS # BLD AUTO: 0.04 K/UL (ref 0–0.2)
BASOPHILS NFR BLD: 0.3 % (ref 0–1.9)
BENZODIAZ UR QL SCN>200 NG/ML: NEGATIVE
BUN SERPL-MCNC: 8 MG/DL (ref 6–20)
BZE UR QL SCN: NEGATIVE
CALCIUM SERPL-MCNC: 8.7 MG/DL (ref 8.7–10.5)
CANNABINOIDS UR QL SCN: NEGATIVE
CHLORIDE SERPL-SCNC: 103 MMOL/L (ref 95–110)
CK SERPL-CCNC: 300 U/L (ref 20–200)
CO2 SERPL-SCNC: 21 MMOL/L (ref 23–29)
CREAT SERPL-MCNC: 1.4 MG/DL (ref 0.5–1.4)
CREAT UR-MCNC: 79 MG/DL (ref 23–375)
D DIMER PPP IA.FEU-MCNC: 1.29 MG/L FEU
DIFFERENTIAL METHOD: ABNORMAL
EOSINOPHIL # BLD AUTO: 0 K/UL (ref 0–0.5)
EOSINOPHIL NFR BLD: 0.1 % (ref 0–8)
ERYTHROCYTE [DISTWIDTH] IN BLOOD BY AUTOMATED COUNT: 21.8 % (ref 11.5–14.5)
ERYTHROCYTE [SEDIMENTATION RATE] IN BLOOD BY PHOTOMETRIC METHOD: 97 MM/HR (ref 0–23)
EST. GFR  (NO RACE VARIABLE): >60 ML/MIN/1.73 M^2
ETHANOL UR-MCNC: <10 MG/DL
FERRITIN SERPL-MCNC: 97 NG/ML (ref 20–300)
GLUCOSE SERPL-MCNC: 105 MG/DL (ref 70–110)
HCT VFR BLD AUTO: 39.4 % (ref 40–54)
HGB BLD-MCNC: 12.1 G/DL (ref 14–18)
IMM GRANULOCYTES # BLD AUTO: 0.03 K/UL (ref 0–0.04)
IMM GRANULOCYTES NFR BLD AUTO: 0.2 % (ref 0–0.5)
INR PPP: 2 (ref 0.8–1.2)
IRON SERPL-MCNC: <10 UG/DL (ref 45–160)
LACTATE SERPL-SCNC: 2.2 MMOL/L (ref 0.5–2.2)
LDH SERPL L TO P-CCNC: 258 U/L (ref 110–260)
LYMPHOCYTES # BLD AUTO: 1.9 K/UL (ref 1–4.8)
LYMPHOCYTES NFR BLD: 15.1 % (ref 18–48)
MAGNESIUM SERPL-MCNC: 1.8 MG/DL (ref 1.6–2.6)
MCH RBC QN AUTO: 24.6 PG (ref 27–31)
MCHC RBC AUTO-ENTMCNC: 30.7 G/DL (ref 32–36)
MCV RBC AUTO: 80 FL (ref 82–98)
METHADONE UR QL SCN>300 NG/ML: NEGATIVE
MONOCYTES # BLD AUTO: 1.2 K/UL (ref 0.3–1)
MONOCYTES NFR BLD: 9.7 % (ref 4–15)
NEUTROPHILS # BLD AUTO: 9.5 K/UL (ref 1.8–7.7)
NEUTROPHILS NFR BLD: 74.6 % (ref 38–73)
NRBC BLD-RTO: 0 /100 WBC
OPIATES UR QL SCN: NEGATIVE
PCP UR QL SCN>25 NG/ML: NEGATIVE
PHOSPHATE SERPL-MCNC: 2.9 MG/DL (ref 2.7–4.5)
PLATELET # BLD AUTO: 223 K/UL (ref 150–450)
PMV BLD AUTO: 9.9 FL (ref 9.2–12.9)
POCT GLUCOSE: 131 MG/DL (ref 70–110)
POCT GLUCOSE: 136 MG/DL (ref 70–110)
POCT GLUCOSE: 258 MG/DL (ref 70–110)
POCT GLUCOSE: 303 MG/DL (ref 70–110)
POTASSIUM SERPL-SCNC: 3.9 MMOL/L (ref 3.5–5.1)
PROTHROMBIN TIME: 20.7 SEC (ref 9–12.5)
RBC # BLD AUTO: 4.92 M/UL (ref 4.6–6.2)
SATURATED IRON: ABNORMAL % (ref 20–50)
SODIUM SERPL-SCNC: 135 MMOL/L (ref 136–145)
TOTAL IRON BINDING CAPACITY: 508 UG/DL (ref 250–450)
TOXICOLOGY INFORMATION: NORMAL
TRANSFERRIN SERPL-MCNC: 343 MG/DL (ref 200–375)
TROPONIN I SERPL DL<=0.01 NG/ML-MCNC: 0.17 NG/ML (ref 0–0.03)
WBC # BLD AUTO: 12.69 K/UL (ref 3.9–12.7)

## 2023-06-03 PROCEDURE — 87077 CULTURE AEROBIC IDENTIFY: CPT | Performed by: STUDENT IN AN ORGANIZED HEALTH CARE EDUCATION/TRAINING PROGRAM

## 2023-06-03 PROCEDURE — 25000242 PHARM REV CODE 250 ALT 637 W/ HCPCS: Performed by: STUDENT IN AN ORGANIZED HEALTH CARE EDUCATION/TRAINING PROGRAM

## 2023-06-03 PROCEDURE — 99223 PR INITIAL HOSPITAL CARE,LEVL III: ICD-10-PCS | Mod: 25,,, | Performed by: INTERNAL MEDICINE

## 2023-06-03 PROCEDURE — 94761 N-INVAS EAR/PLS OXIMETRY MLT: CPT

## 2023-06-03 PROCEDURE — 25000003 PHARM REV CODE 250: Performed by: STUDENT IN AN ORGANIZED HEALTH CARE EDUCATION/TRAINING PROGRAM

## 2023-06-03 PROCEDURE — 85652 RBC SED RATE AUTOMATED: CPT | Performed by: STUDENT IN AN ORGANIZED HEALTH CARE EDUCATION/TRAINING PROGRAM

## 2023-06-03 PROCEDURE — 84100 ASSAY OF PHOSPHORUS: CPT | Performed by: STUDENT IN AN ORGANIZED HEALTH CARE EDUCATION/TRAINING PROGRAM

## 2023-06-03 PROCEDURE — 84484 ASSAY OF TROPONIN QUANT: CPT | Performed by: STUDENT IN AN ORGANIZED HEALTH CARE EDUCATION/TRAINING PROGRAM

## 2023-06-03 PROCEDURE — 84466 ASSAY OF TRANSFERRIN: CPT | Performed by: STUDENT IN AN ORGANIZED HEALTH CARE EDUCATION/TRAINING PROGRAM

## 2023-06-03 PROCEDURE — 99900035 HC TECH TIME PER 15 MIN (STAT)

## 2023-06-03 PROCEDURE — 25000003 PHARM REV CODE 250: Performed by: INTERNAL MEDICINE

## 2023-06-03 PROCEDURE — 82550 ASSAY OF CK (CPK): CPT | Performed by: STUDENT IN AN ORGANIZED HEALTH CARE EDUCATION/TRAINING PROGRAM

## 2023-06-03 PROCEDURE — 63600175 PHARM REV CODE 636 W HCPCS: Performed by: INTERNAL MEDICINE

## 2023-06-03 PROCEDURE — 85730 THROMBOPLASTIN TIME PARTIAL: CPT | Mod: 91 | Performed by: STUDENT IN AN ORGANIZED HEALTH CARE EDUCATION/TRAINING PROGRAM

## 2023-06-03 PROCEDURE — 87040 BLOOD CULTURE FOR BACTERIA: CPT | Mod: 59 | Performed by: STUDENT IN AN ORGANIZED HEALTH CARE EDUCATION/TRAINING PROGRAM

## 2023-06-03 PROCEDURE — 80048 BASIC METABOLIC PNL TOTAL CA: CPT | Performed by: STUDENT IN AN ORGANIZED HEALTH CARE EDUCATION/TRAINING PROGRAM

## 2023-06-03 PROCEDURE — 83615 LACTATE (LD) (LDH) ENZYME: CPT | Performed by: STUDENT IN AN ORGANIZED HEALTH CARE EDUCATION/TRAINING PROGRAM

## 2023-06-03 PROCEDURE — 87186 SC STD MICRODIL/AGAR DIL: CPT | Performed by: STUDENT IN AN ORGANIZED HEALTH CARE EDUCATION/TRAINING PROGRAM

## 2023-06-03 PROCEDURE — 99222 PR INITIAL HOSPITAL CARE,LEVL II: ICD-10-PCS | Mod: ,,, | Performed by: PHYSICIAN ASSISTANT

## 2023-06-03 PROCEDURE — 63600175 PHARM REV CODE 636 W HCPCS: Performed by: STUDENT IN AN ORGANIZED HEALTH CARE EDUCATION/TRAINING PROGRAM

## 2023-06-03 PROCEDURE — 83605 ASSAY OF LACTIC ACID: CPT | Performed by: STUDENT IN AN ORGANIZED HEALTH CARE EDUCATION/TRAINING PROGRAM

## 2023-06-03 PROCEDURE — 99222 1ST HOSP IP/OBS MODERATE 55: CPT | Mod: ,,, | Performed by: PHYSICIAN ASSISTANT

## 2023-06-03 PROCEDURE — 63600175 PHARM REV CODE 636 W HCPCS: Mod: JZ,TB | Performed by: STUDENT IN AN ORGANIZED HEALTH CARE EDUCATION/TRAINING PROGRAM

## 2023-06-03 PROCEDURE — 82728 ASSAY OF FERRITIN: CPT | Performed by: STUDENT IN AN ORGANIZED HEALTH CARE EDUCATION/TRAINING PROGRAM

## 2023-06-03 PROCEDURE — 63600175 PHARM REV CODE 636 W HCPCS: Performed by: PHYSICIAN ASSISTANT

## 2023-06-03 PROCEDURE — 99223 1ST HOSP IP/OBS HIGH 75: CPT | Mod: ,,, | Performed by: STUDENT IN AN ORGANIZED HEALTH CARE EDUCATION/TRAINING PROGRAM

## 2023-06-03 PROCEDURE — 99223 1ST HOSP IP/OBS HIGH 75: CPT | Mod: 25,,, | Performed by: INTERNAL MEDICINE

## 2023-06-03 PROCEDURE — 93750 INTERROGATION VAD IN PERSON: CPT | Mod: ,,, | Performed by: INTERNAL MEDICINE

## 2023-06-03 PROCEDURE — 27000248 HC VAD-ADDITIONAL DAY

## 2023-06-03 PROCEDURE — 83735 ASSAY OF MAGNESIUM: CPT | Performed by: STUDENT IN AN ORGANIZED HEALTH CARE EDUCATION/TRAINING PROGRAM

## 2023-06-03 PROCEDURE — 99223 PR INITIAL HOSPITAL CARE,LEVL III: ICD-10-PCS | Mod: ,,, | Performed by: STUDENT IN AN ORGANIZED HEALTH CARE EDUCATION/TRAINING PROGRAM

## 2023-06-03 PROCEDURE — 27100098 HC SPACER

## 2023-06-03 PROCEDURE — 20600001 HC STEP DOWN PRIVATE ROOM

## 2023-06-03 PROCEDURE — 85025 COMPLETE CBC W/AUTO DIFF WBC: CPT | Performed by: STUDENT IN AN ORGANIZED HEALTH CARE EDUCATION/TRAINING PROGRAM

## 2023-06-03 PROCEDURE — 36415 COLL VENOUS BLD VENIPUNCTURE: CPT | Performed by: STUDENT IN AN ORGANIZED HEALTH CARE EDUCATION/TRAINING PROGRAM

## 2023-06-03 PROCEDURE — 87075 CULTR BACTERIA EXCEPT BLOOD: CPT | Performed by: STUDENT IN AN ORGANIZED HEALTH CARE EDUCATION/TRAINING PROGRAM

## 2023-06-03 PROCEDURE — 93750 PR INTERROGATE VENT ASSIST DEV, IN PERSON, W PHYSICIAN ANALYSIS: ICD-10-PCS | Mod: ,,, | Performed by: INTERNAL MEDICINE

## 2023-06-03 PROCEDURE — 80307 DRUG TEST PRSMV CHEM ANLYZR: CPT | Performed by: STUDENT IN AN ORGANIZED HEALTH CARE EDUCATION/TRAINING PROGRAM

## 2023-06-03 PROCEDURE — 94640 AIRWAY INHALATION TREATMENT: CPT

## 2023-06-03 PROCEDURE — 85610 PROTHROMBIN TIME: CPT | Performed by: STUDENT IN AN ORGANIZED HEALTH CARE EDUCATION/TRAINING PROGRAM

## 2023-06-03 PROCEDURE — 85730 THROMBOPLASTIN TIME PARTIAL: CPT | Performed by: STUDENT IN AN ORGANIZED HEALTH CARE EDUCATION/TRAINING PROGRAM

## 2023-06-03 PROCEDURE — 87070 CULTURE OTHR SPECIMN AEROBIC: CPT | Performed by: STUDENT IN AN ORGANIZED HEALTH CARE EDUCATION/TRAINING PROGRAM

## 2023-06-03 PROCEDURE — 85379 FIBRIN DEGRADATION QUANT: CPT | Performed by: STUDENT IN AN ORGANIZED HEALTH CARE EDUCATION/TRAINING PROGRAM

## 2023-06-03 RX ORDER — MORPHINE SULFATE 2 MG/ML
2 INJECTION, SOLUTION INTRAMUSCULAR; INTRAVENOUS EVERY 6 HOURS PRN
Status: DISCONTINUED | OUTPATIENT
Start: 2023-06-03 | End: 2023-06-05

## 2023-06-03 RX ORDER — FUROSEMIDE 40 MG/1
40 TABLET ORAL 2 TIMES DAILY
Status: DISCONTINUED | OUTPATIENT
Start: 2023-06-03 | End: 2023-06-07 | Stop reason: HOSPADM

## 2023-06-03 RX ORDER — INSULIN ASPART 100 [IU]/ML
1-10 INJECTION, SOLUTION INTRAVENOUS; SUBCUTANEOUS
Status: DISCONTINUED | OUTPATIENT
Start: 2023-06-03 | End: 2023-06-07 | Stop reason: HOSPADM

## 2023-06-03 RX ORDER — INSULIN ASPART 100 [IU]/ML
10 INJECTION, SOLUTION INTRAVENOUS; SUBCUTANEOUS
Status: DISCONTINUED | OUTPATIENT
Start: 2023-06-03 | End: 2023-06-04

## 2023-06-03 RX ORDER — ACETAMINOPHEN 500 MG
1000 TABLET ORAL EVERY 6 HOURS PRN
Status: DISCONTINUED | OUTPATIENT
Start: 2023-06-03 | End: 2023-06-07 | Stop reason: HOSPADM

## 2023-06-03 RX ADMIN — ALBUTEROL SULFATE 2 PUFF: 108 AEROSOL, METERED RESPIRATORY (INHALATION) at 01:06

## 2023-06-03 RX ADMIN — ALBUTEROL SULFATE 2 PUFF: 108 AEROSOL, METERED RESPIRATORY (INHALATION) at 08:06

## 2023-06-03 RX ADMIN — ACETAMINOPHEN 1000 MG: 500 TABLET ORAL at 09:06

## 2023-06-03 RX ADMIN — VANCOMYCIN HYDROCHLORIDE 1000 MG: 1 INJECTION, POWDER, LYOPHILIZED, FOR SOLUTION INTRAVENOUS at 06:06

## 2023-06-03 RX ADMIN — PIPERACILLIN AND TAZOBACTAM 4.5 G: 4; .5 INJECTION, POWDER, LYOPHILIZED, FOR SOLUTION INTRAVENOUS; PARENTERAL at 09:06

## 2023-06-03 RX ADMIN — INSULIN DETEMIR 15 UNITS: 100 INJECTION, SOLUTION SUBCUTANEOUS at 08:06

## 2023-06-03 RX ADMIN — INSULIN ASPART 10 UNITS: 100 INJECTION, SOLUTION INTRAVENOUS; SUBCUTANEOUS at 05:06

## 2023-06-03 RX ADMIN — FAMOTIDINE 40 MG: 20 TABLET, FILM COATED ORAL at 09:06

## 2023-06-03 RX ADMIN — FUROSEMIDE 40 MG: 40 TABLET ORAL at 09:06

## 2023-06-03 RX ADMIN — PIPERACILLIN AND TAZOBACTAM 4.5 G: 4; .5 INJECTION, POWDER, LYOPHILIZED, FOR SOLUTION INTRAVENOUS; PARENTERAL at 12:06

## 2023-06-03 RX ADMIN — THERA TABS 1 TABLET: TAB at 09:06

## 2023-06-03 RX ADMIN — MILRINONE LACTATE IN DEXTROSE 0.25 MCG/KG/MIN: 200 INJECTION, SOLUTION INTRAVENOUS at 12:06

## 2023-06-03 RX ADMIN — MORPHINE SULFATE 2 MG: 2 INJECTION, SOLUTION INTRAMUSCULAR; INTRAVENOUS at 08:06

## 2023-06-03 RX ADMIN — FUROSEMIDE 40 MG: 40 TABLET ORAL at 05:06

## 2023-06-03 RX ADMIN — ALBUTEROL SULFATE 2 PUFF: 108 AEROSOL, METERED RESPIRATORY (INHALATION) at 09:06

## 2023-06-03 RX ADMIN — MIRTAZAPINE 15 MG: 15 TABLET, FILM COATED ORAL at 10:06

## 2023-06-03 RX ADMIN — SPIRONOLACTONE 25 MG: 25 TABLET, FILM COATED ORAL at 09:06

## 2023-06-03 RX ADMIN — VANCOMYCIN HYDROCHLORIDE 1000 MG: 1 INJECTION, POWDER, LYOPHILIZED, FOR SOLUTION INTRAVENOUS at 10:06

## 2023-06-03 RX ADMIN — REMDESIVIR 200 MG: 100 INJECTION, POWDER, LYOPHILIZED, FOR SOLUTION INTRAVENOUS at 01:06

## 2023-06-03 RX ADMIN — OXYCODONE HYDROCHLORIDE AND ACETAMINOPHEN 500 MG: 500 TABLET ORAL at 09:06

## 2023-06-03 RX ADMIN — VALSARTAN 40 MG: 40 TABLET, FILM COATED ORAL at 10:06

## 2023-06-03 RX ADMIN — LEVETIRACETAM 1000 MG: 500 TABLET, FILM COATED ORAL at 10:06

## 2023-06-03 RX ADMIN — MILRINONE LACTATE IN DEXTROSE 0.25 MCG/KG/MIN: 200 INJECTION, SOLUTION INTRAVENOUS at 11:06

## 2023-06-03 RX ADMIN — BUSPIRONE HYDROCHLORIDE 7.5 MG: 5 TABLET ORAL at 09:06

## 2023-06-03 RX ADMIN — LEVETIRACETAM 1000 MG: 500 TABLET, FILM COATED ORAL at 09:06

## 2023-06-03 RX ADMIN — MORPHINE SULFATE 2 MG: 2 INJECTION, SOLUTION INTRAMUSCULAR; INTRAVENOUS at 01:06

## 2023-06-03 RX ADMIN — WARFARIN SODIUM 4 MG: 4 TABLET ORAL at 04:06

## 2023-06-03 RX ADMIN — MORPHINE SULFATE 2 MG: 2 INJECTION, SOLUTION INTRAMUSCULAR; INTRAVENOUS at 10:06

## 2023-06-03 RX ADMIN — DEXAMETHASONE 6 MG: 4 TABLET ORAL at 09:06

## 2023-06-03 RX ADMIN — OXYCODONE HYDROCHLORIDE AND ACETAMINOPHEN 500 MG: 500 TABLET ORAL at 10:06

## 2023-06-03 RX ADMIN — INSULIN ASPART 10 UNITS: 100 INJECTION, SOLUTION INTRAVENOUS; SUBCUTANEOUS at 12:06

## 2023-06-03 RX ADMIN — INSULIN DETEMIR 15 UNITS: 100 INJECTION, SOLUTION SUBCUTANEOUS at 09:06

## 2023-06-03 RX ADMIN — INSULIN ASPART 6 UNITS: 100 INJECTION, SOLUTION INTRAVENOUS; SUBCUTANEOUS at 05:06

## 2023-06-03 RX ADMIN — INSULIN ASPART 10 UNITS: 100 INJECTION, SOLUTION INTRAVENOUS; SUBCUTANEOUS at 08:06

## 2023-06-03 RX ADMIN — VALSARTAN 40 MG: 40 TABLET, FILM COATED ORAL at 09:06

## 2023-06-03 RX ADMIN — FERUMOXYTOL 510 MG: 510 INJECTION INTRAVENOUS at 07:06

## 2023-06-03 RX ADMIN — ALBUTEROL SULFATE 2 PUFF: 108 AEROSOL, METERED RESPIRATORY (INHALATION) at 12:06

## 2023-06-03 RX ADMIN — INSULIN ASPART 4 UNITS: 100 INJECTION, SOLUTION INTRAVENOUS; SUBCUTANEOUS at 10:06

## 2023-06-03 RX ADMIN — PIPERACILLIN AND TAZOBACTAM 4.5 G: 4; .5 INJECTION, POWDER, LYOPHILIZED, FOR SOLUTION INTRAVENOUS; PARENTERAL at 05:06

## 2023-06-03 NOTE — SUBJECTIVE & OBJECTIVE
Past Medical History:   Diagnosis Date    Arthritis     Cardiomyopathy     CHF (congestive heart failure) 10/01/2020    Diabetes mellitus     Dilated cardiomyopathy 10/26/2020    Drug abuse 10/2020    Hyperosmolar hyperglycemic state (HHS) 5/25/2022    ICD (implantable cardioverter-defibrillator) in place 10/26/2020    Muscle cramping 6/15/2022    Renal disorder        Past Surgical History:   Procedure Laterality Date    APPLICATION OF WOUND VACUUM-ASSISTED CLOSURE DEVICE N/A 6/30/2022    Procedure: APPLICATION, WOUND VAC;  Surgeon: Luis F Paige MD;  Location: [facility] OR [address] [place] [address];  Service: Cardiovascular;  Laterality: N/A;  50 x 5 cm    CARDIAC DEFIBRILLATOR PLACEMENT      IMPLANTATION OF RIGHT VENTRICULAR ASSIST DEVICE (RVAD) N/A 6/29/2022    Procedure: INSERTION, RVAD;  Surgeon: Luis F Paige MD;  [facility];  Service: Cardiovascular;  Laterality: N/A;    INSERTION OF GRAFT TO PERICARDIUM Right 6/30/2022    Procedure: INSERTION-RIGHT VENTRICULAR ASSIST DEVICE;  Surgeon: Luis F Paige MD;  [address]  Service: Cardiovascular;  Laterality: Right;    IRRIGATION OF MEDIASTINUM  6/30/2022    Procedure: IRRIGATION, MEDIASTINUM;  Surgeon: Luis F Paige MD;  [facility];  Service: Cardiovascular;;    LEFT VENTRICULAR ASSIST DEVICE Left 6/23/2022    Procedure: INSERTION-LEFT VENTRICULAR ASSIST DEVICE;  Surgeon: Luis F Paige MD;  [facility];  Service: Cardiovascular;  Laterality: Left;    LEFT VENTRICULAR ASSIST DEVICE N/A 6/29/2022    Procedure: INSERTION-LEFT VENTRICULAR ASSIST DEVICE;  Surgeon: Luis F Paige MD;  [facility];  Service: Cardiovascular;  Laterality: N/A;    RIGHT HEART CATHETERIZATION Right 4/8/2022    Procedure: INSERTION, CATHETER, RIGHT HEART;  Surgeon: Luca Lopez Jr., MD;  Location: [facility] CATH LAB;  Service: Cardiology;  Laterality: Right;    RIGHT HEART CATHETERIZATION Right 4/19/2022    Procedure: INSERTION,  CATHETER, RIGHT HEART;  Surgeon: Josh Pulido MD;  Location: [facility] CATH LAB;  Service: Cardiology;  Laterality: Right;    RIGHT HEART CATHETERIZATION Right 7/21/2022    Procedure: INSERTION, CATHETER, RIGHT HEART;  Surgeon: Dalia Crum MD;  Location: [facility] CATH LAB;  Service: Cardiology;  Laterality: Right;    RIGHT HEART CATHETERIZATION Right 10/31/2022    Procedure: INSERTION, CATHETER, RIGHT HEART;  Surgeon: Dalia Crum MD;  Location: [facility] CATH LAB;  Service: Cardiology;  Laterality: Right;    STERNAL WOUND CLOSURE N/A 6/30/2022    Procedure: CLOSURE, WOUND, STERNUM;  Surgeon: Luis F Paige MD;  [facility];  Service: Cardiovascular;  Laterality: N/A;       Review of patient's allergies indicates:   Allergen Reactions    Aspirin Other (See Comments)     Mr. Thacker is enrolled in Dr. Paige's Alon Trial and cannot have any aspirin and/or aspirin-containing products. DO NOT cancel any orders for INV Aspirin 100 mg/Placebo. If you have questions, please contact Isabel @ [phone], [phone],[email or web address], secure chat or MS Teams message.    Bumex [bumetanide] Hives    Lactose Diarrhea     Other reaction(s): Abdominal distension, gaseous    Torsemide Hives       Medications:  Medications Prior to Admission   Medication Sig    blood sugar diagnostic Strp Use to test blood glucose 4 (four) times daily.    blood-glucose meter Misc Use as instructed    busPIRone (BUSPAR) 7.5 MG tablet Take 1 tablet (7.5 mg total) by mouth once daily at 6am.    famotidine (PEPCID) 40 MG tablet Take 1 tablet (40 mg total) by mouth once daily.    ferrous gluconate 324 mg (37.5 mg iron) Tab tablet Take 1 tablet (324 mg total) by mouth daily with breakfast.    furosemide (LASIX) 80 MG tablet Take 1 tablet (80 mg total) by mouth once daily.    gabapentin (NEURONTIN) 100 MG capsule Take 1 capsule (100 mg total) by mouth 3 (three) times daily.    HYDROcodone-acetaminophen (NORCO) 5-325 mg per tablet  Take 1 tablet by mouth every 8 (eight) hours as needed for Pain.    insulin aspart U-100 (NOVOLOG) 100 unit/mL (3 mL) InPn pen Inject 16 Units into the skin 3 (three) times daily with meals. Plus Sliding Scale: 151-200: +1, 201-250: +2, 251-300: +3, 301-350: +4 and call MD; Max Daily Dose: 60 units    insulin detemir U-100 (LEVEMIR FLEXTOUCH) 100 unit/mL (3 mL) SubQ InPn pen Inject 22 Units into the skin 2 (two) times daily.    INV ASPIRIN 100MG/PLACEBO Take 1 capsule (100 mg) by mouth once daily. FOR INVESTIGATIONAL USE ONLY. Protocol: GASTON III subject # 0084.    lancets 33 gauge Misc Use to test blood glucose 4 (four) times daily.    levETIRAcetam (KEPPRA) 1000 MG tablet Take 1 tablet (1,000 mg total) by mouth 2 (two) times daily.    milrinone 20mg/100ml D5W, 200mcg/ml, (PRIMACOR) 20 mg/100 mL (200 mcg/mL) infusion Inject 34.875 mcg/min into the vein continuous.    mirtazapine (REMERON) 15 MG tablet Take 1 tablet (15 mg total) by mouth nightly.    potassium chloride SA (K-DUR,KLOR-CON) 20 MEQ tablet Take 2 tablets (40 mEq total) by mouth 3 (three) times daily.    spironolactone (ALDACTONE) 25 MG tablet Take 1 tablet (25 mg total) by mouth once daily.    valsartan (DIOVAN) 40 MG tablet Take 1 tablet (40 mg total) by mouth 2 (two) times daily.    warfarin (COUMADIN) 3 MG tablet Take 1.5 tablets (4.5mg) orally daily, except 2 tablets (6mg) on Wednesdays     Antibiotics (From admission, onward)      Start     Stop Route Frequency Ordered    06/03/23 0600  vancomycin (VANCOCIN) 1,000 mg in dextrose 5 % (D5W) 250 mL IVPB (Vial-Mate)         -- IV Every 12 hours (non-standard times) 06/03/23 0036    06/03/23 0115  piperacillin-tazobactam (ZOSYN) 4.5 g in dextrose 5 % in water (D5W) 5 % 100 mL IVPB (MB+)         -- IV Every 8 hours (non-standard times) 06/03/23 0013    06/03/23 0057  vancomycin - pharmacy to dose  (vancomycin IVPB)        See Hyperspace for full Linked Orders Report.    -- IV pharmacy to manage frequency  23 2357          Antifungals (From admission, onward)      None          Antivirals (From admission, onward)          Stop Route Frequency     remdesivir 100 mg        See Hyperspace for full Linked Orders Report.     0859 IV Daily     remdesivir 100 mg        See Hyperspace for full Linked Orders Report.     1214 IV Every 24 hours (non-standard times)             Immunization History   Administered Date(s) Administered    COVID-19, MRNA, LN-S, PF (Pfizer) (Purple Cap) 2021, 2021    DTP 1985, 1985, 1985, 1987    HIB 1990    Influenza - Quadrivalent - PF *Preferred* (6 months and older) 10/16/2020, 2021, 2022    MMR 1987    OPV 1985, 1985, 1987       Family History       Problem Relation (Age of Onset)    Diverticulosis Brother    Heart attack Maternal Grandmother, Maternal Grandfather    Heart failure Father          Social History     Socioeconomic History    Marital status: Legally    Tobacco Use    Smoking status: Former     Packs/day: 0.50     Years: 16.00     Pack years: 8.00     Types: Cigarettes     Start date: 10/1/2004     Quit date: 2021     Years since quittin.1    Smokeless tobacco: Never   Substance and Sexual Activity    Alcohol use: Not Currently    Drug use: Not Currently     Types: Marijuana, MDMA (Ecstacy)    Sexual activity: Yes     Partners: Female     Birth control/protection: None     Review of Systems   Constitutional:  Positive for fever. Negative for chills.   Respiratory:  Positive for cough.    Skin:  Positive for wound.   Objective:     Vital Signs (Most Recent):  Temp: 97.1 °F (36.2 °C) (23 1153)  Pulse: 101 (23 1114)  Resp: 16 (23 1153)  BP: (!) 78/0 (23 1153)  SpO2: 97 % (23 1153) Vital Signs (24h Range):  Temp:  [97.1 °F (36.2 °C)-102 °F (38.9 °C)] 97.1 °F (36.2 °C)  Pulse:  [] 101  Resp:  [15-24] 16  SpO2:  [95 %-100 %] 97 %  BP:  ()/(0-88) 78/0     Weight: 99.4 kg (219 lb 2.2 oz)  Body mass index is 27.39 kg/m².    Estimated Creatinine Clearance: 85.5 mL/min (based on SCr of 1.4 mg/dL).     Physical Exam  Constitutional:       General: He is not in acute distress.     Appearance: He is not ill-appearing or toxic-appearing.   Eyes:      General: No scleral icterus.        Right eye: No discharge.         Left eye: No discharge.   Cardiovascular:      Comments: R defib  Pulmonary:      Effort: Pulmonary effort is normal. No respiratory distress.      Breath sounds: No rhonchi.   Abdominal:      General: There is no distension.      Palpations: Abdomen is soft.      Tenderness: There is no abdominal tenderness.      Comments: DLES dressing with serosang fluid   Musculoskeletal:      Right lower leg: No edema.      Left lower leg: No edema.   Skin:     General: Skin is warm and dry.      Coloration: Skin is not jaundiced.      Findings: No bruising.      Comments: R picc     Neurological:      Mental Status: He is alert and oriented to person, place, and time.        Significant Labs:   Microbiology Results (last 7 days)       Procedure Component Value Units Date/Time    Blood culture [[id number]] Collected: 06/03/23 0040    Order Status: Completed Specimen: Blood Updated: 06/03/23 0745     Blood Culture, Routine No Growth to date    Blood culture [[id number]] Collected: 06/03/23 0040    Order Status: Completed Specimen: Blood Updated: 06/03/23 0745     Blood Culture, Routine No Growth to date    Aerobic culture [[id number]] Collected: 06/03/23 0135    Order Status: Sent Specimen: Skin from Abdomen Updated: 06/03/23 0144    Culture, Anaerobe [[id number]] Collected: 06/03/23 0135    Order Status: Sent Specimen: Skin from Abdomen Updated: 06/03/23 0144            Significant Imaging: I have reviewed all pertinent imaging results/findings within the past 24 hours.

## 2023-06-03 NOTE — ASSESSMENT & PLAN NOTE
Patient tested positive for COVID on 6/2/23  Date of symptom onset: 5/30/22  O2 requirement: room air  Abx: vancomycin and zosyn (started on 6/3/23)  Trending inflammatory markers  Start remdesivir

## 2023-06-03 NOTE — SUBJECTIVE & OBJECTIVE
Past Medical History:   Diagnosis Date    Arthritis     Cardiomyopathy     CHF (congestive heart failure) 10/01/2020    Diabetes mellitus     Dilated cardiomyopathy 10/26/2020    Drug abuse 10/2020    Hyperosmolar hyperglycemic state (HHS) 5/25/2022    ICD (implantable cardioverter-defibrillator) in place 10/26/2020    Muscle cramping 6/15/2022    Renal disorder        Past Surgical History:   Procedure Laterality Date    APPLICATION OF WOUND VACUUM-ASSISTED CLOSURE DEVICE N/A 6/30/2022    Procedure: APPLICATION, WOUND VAC;  Surgeon: Luis F Paige MD;  Location: [facility] OR [address] [place] [address];  Service: Cardiovascular;  Laterality: N/A;  50 x 5 cm    CARDIAC DEFIBRILLATOR PLACEMENT      IMPLANTATION OF RIGHT VENTRICULAR ASSIST DEVICE (RVAD) N/A 6/29/2022    Procedure: INSERTION, RVAD;  Surgeon: Luis F Paige MD;  [facility];  Service: Cardiovascular;  Laterality: N/A;    INSERTION OF GRAFT TO PERICARDIUM Right 6/30/2022    Procedure: INSERTION-RIGHT VENTRICULAR ASSIST DEVICE;  Surgeon: Luis F Paige MD;  [address]  Service: Cardiovascular;  Laterality: Right;    IRRIGATION OF MEDIASTINUM  6/30/2022    Procedure: IRRIGATION, MEDIASTINUM;  Surgeon: Luis F Paige MD;  [facility];  Service: Cardiovascular;;    LEFT VENTRICULAR ASSIST DEVICE Left 6/23/2022    Procedure: INSERTION-LEFT VENTRICULAR ASSIST DEVICE;  Surgeon: Luis F Paige MD;  [facility];  Service: Cardiovascular;  Laterality: Left;    LEFT VENTRICULAR ASSIST DEVICE N/A 6/29/2022    Procedure: INSERTION-LEFT VENTRICULAR ASSIST DEVICE;  Surgeon: Luis F Paige MD;  [facility];  Service: Cardiovascular;  Laterality: N/A;    RIGHT HEART CATHETERIZATION Right 4/8/2022    Procedure: INSERTION, CATHETER, RIGHT HEART;  Surgeon: Luca Lopez Jr., MD;  Location: [facility] CATH LAB;  Service: Cardiology;  Laterality: Right;    RIGHT HEART CATHETERIZATION Right 4/19/2022    Procedure: INSERTION,  CATHETER, RIGHT HEART;  Surgeon: Josh Pulido MD;  Location: [facility] CATH LAB;  Service: Cardiology;  Laterality: Right;    RIGHT HEART CATHETERIZATION Right 7/21/2022    Procedure: INSERTION, CATHETER, RIGHT HEART;  Surgeon: Dalia Crum MD;  Location: [facility] CATH LAB;  Service: Cardiology;  Laterality: Right;    RIGHT HEART CATHETERIZATION Right 10/31/2022    Procedure: INSERTION, CATHETER, RIGHT HEART;  Surgeon: Dalia Crum MD;  Location: [facility] CATH LAB;  Service: Cardiology;  Laterality: Right;    STERNAL WOUND CLOSURE N/A 6/30/2022    Procedure: CLOSURE, WOUND, STERNUM;  Surgeon: Luis F Paige MD;  Location: [facility] OR [address] [place] [address];  Service: Cardiovascular;  Laterality: N/A;       Review of patient's allergies indicates:   Allergen Reactions    Aspirin Other (See Comments)     Mr. Thacker is enrolled in Dr. Paige's Alon Trial and cannot have any aspirin and/or aspirin-containing products. DO NOT cancel any orders for INV Aspirin 100 mg/Placebo. If you have questions, please contact Isabel @ [phone], [phone],[email or web address], secure chat or MS Teams message.    Bumex [bumetanide] Hives    Lactose Diarrhea     Other reaction(s): Abdominal distension, gaseous    Torsemide Hives       Current Facility-Administered Medications   Medication    albuterol inhaler 2 puff    ascorbic acid (vitamin C) tablet 500 mg    busPIRone split tablet 7.5 mg    ceFEPIme (MAXIPIME) 2 g in dextrose 5 % in water (D5W) 5 % 50 mL IVPB (MB+)    cyclobenzaprine tablet 5 mg    dexAMETHasone tablet 6 mg    dextrose 10% bolus 125 mL 125 mL    dextrose 10% bolus 125 mL 125 mL    dextrose 10% bolus 250 mL 250 mL    dextrose 10% bolus 250 mL 250 mL    dextrose 40 % gel 15,000 mg    dextrose 40 % gel 30,000 mg    famotidine tablet 40 mg    gabapentin capsule 100 mg    glucagon (human recombinant) injection 1 mg    insulin aspart U-100 pen 0-5 Units    insulin detemir U-100 (Levemir) pen 15 Units    INV ASPIRIN 100MG/PLACEBO 100  mg    levETIRAcetam tablet 1,000 mg    milrinone 20mg in D5W 100 mL infusion    mirtazapine tablet 15 mg    multivitamin tablet    sodium chloride 0.9% flush 10 mL    spironolactone tablet 25 mg    valsartan tablet 40 mg    vancomycin - pharmacy to dose    warfarin (COUMADIN) tablet 4 mg     Family History       Problem Relation (Age of Onset)    Diverticulosis Brother    Heart attack Maternal Grandmother, Maternal Grandfather    Heart failure Father          Tobacco Use    Smoking status: Former     Packs/day: 0.50     Years: 16.00     Pack years: 8.00     Types: Cigarettes     Start date: 10/1/2004     Quit date: 2021     Years since quittin.1    Smokeless tobacco: Never   Substance and Sexual Activity    Alcohol use: Not Currently    Drug use: Not Currently     Types: Marijuana, MDMA (Ecstacy)    Sexual activity: Yes     Partners: Female     Birth control/protection: None     Review of Systems   Constitutional:  Positive for chills and fever. Negative for activity change, appetite change, fatigue and unexpected weight change.   HENT:  Positive for rhinorrhea. Negative for sinus pain, sore throat and trouble swallowing.    Respiratory:  Positive for cough. Negative for chest tightness and shortness of breath.    Cardiovascular:  Positive for chest pain. Negative for palpitations and leg swelling.   Gastrointestinal:  Negative for abdominal distention, constipation, diarrhea, nausea and vomiting.   Genitourinary:  Negative for decreased urine volume.   Musculoskeletal:  Negative for myalgias.   Neurological:  Positive for headaches. Negative for dizziness, seizures, syncope and weakness.   Psychiatric/Behavioral:  Negative for behavioral problems.    Objective:     Vital Signs (Most Recent):  Temp: 98.7 °F (37.1 °C) (23)  Pulse: 65 (23)  Resp: 19 (23)  BP: (!) 82/0 (23)  SpO2: 96 % (23) Vital Signs (24h Range):  Temp:  [98.7 °F (37.1 °C)-102 °F (38.9  °C)] 98.7 °F (37.1 °C)  Pulse:  [] 65  Resp:  [15-24] 19  SpO2:  [96 %-100 %] 96 %  BP: ()/(0-88) 82/0     Patient Vitals for the past 72 hrs (Last 3 readings):   Weight   06/02/23 2327 99.4 kg (219 lb 2.2 oz)     Body mass index is 27.39 kg/m².    No intake or output data in the 24 hours ending 06/03/23 0003       Physical Exam  Constitutional:       Appearance: Normal appearance.   HENT:      Head: Normocephalic and atraumatic.      Mouth/Throat:      Mouth: Mucous membranes are moist.   Neck:      Comments: No elevation in JVP appreciated  Cardiovascular:      Rate and Rhythm: Normal rate.      Comments: Normal VAD hum can be heard  Pulmonary:      Effort: Pulmonary effort is normal. No respiratory distress.      Breath sounds: Normal breath sounds. No wheezing or rales.   Abdominal:      General: Abdomen is flat.      Palpations: Abdomen is soft.      Comments: DLES with drainage around dressing on R anterior lower abdominal wall   Musculoskeletal:         General: No swelling.      Cervical back: Normal range of motion and neck supple.   Skin:     General: Skin is warm.      Capillary Refill: Capillary refill takes 2 to 3 seconds.      Comments: R sided PICC line in basilic vein   Neurological:      General: No focal deficit present.      Mental Status: He is alert and oriented to person, place, and time.   Psychiatric:         Mood and Affect: Mood normal.         Behavior: Behavior normal.          Significant Labs:  CBC:  Recent Labs   Lab 06/02/23  1651   WBC 13.06*   RBC 5.29   HGB 13.3*   HCT 41.2*      MCV 77.9*   MCH 25.1*   MCHC 32.3*     BNP:  No results for input(s): BNP in the last 168 hours.    Invalid input(s): BNPTRIAGELBLO  CMP:  Recent Labs   Lab 06/02/23  1652   CALCIUM 8.9   ALBUMIN 4.0   *   K 4.1   CO2 21*   BUN 7.2*   CREATININE 1.65*   ALKPHOS 101   ALT 10   AST 22   BILITOT 1.0      Coagulation:   No results for input(s): PT, INR, APTT in the last 168  hours.  LDH:  No results for input(s): LDH in the last 72 hours.  Microbiology:  Microbiology Results (last 7 days)       Procedure Component Value Units Date/Time    Culture, Anaerobe [[id number]]     Order Status: No result Specimen: Skin from Abdomen     Aerobic culture [[id number]]     Order Status: No result Specimen: Skin from Abdomen     Blood culture [[id number]]     Order Status: Sent Specimen: Blood     Blood culture [[id number]]     Order Status: Sent Specimen: Blood             I have reviewed all pertinent labs within the past 24 hours.    Diagnostic Results:  I have reviewed all pertinent imaging results/findings within the past 24 hours.

## 2023-06-03 NOTE — PROGRESS NOTES
[facility] - Cardiology Stepdown  Heart Transplant  Progress Note    Patient Name: Kevan Queen  MRN: [id number]  Admission Date: 6/2/2023  Hospital Length of Stay: 1 days  Attending Physician: Angela Yang DO  Primary Care Provider: ORALIA Cline  Principal Problem:COVID-19    Subjective:     Interval History: NAEO. Patient feels well and has no new complaints. He is breathing comfortable on room air. Cultures pending. ESR elevated. Currently on vanc+zosyn.    Continuous Infusions:   milrinone 20mg/100ml D5W (200mcg/ml) 0.25 mcg/kg/min (06/03/23 1149)     Scheduled Meds:   albuterol  2 puff Inhalation Q6H    ascorbic acid (vitamin C)  500 mg Oral BID    busPIRone  7.5 mg Oral Daily    famotidine  40 mg Oral Daily    furosemide  40 mg Oral BID    gabapentin  100 mg Oral TID    insulin aspart U-100  10 Units Subcutaneous TIDWM    insulin detemir U-100 (Levemir)  15 Units Subcutaneous BID    INV ASPIRIN 100MG/PLACEBO  100 mg Oral Daily    levETIRAcetam  1,000 mg Oral BID    mirtazapine  15 mg Oral Nightly    multivitamin  1 tablet Oral Daily    piperacillin-tazobactam (ZOSYN) IVPB  4.5 g Intravenous Q8H    remdesivir loading dose infusion  200 mg Intravenous Q24H    Followed by    [START ON 6/4/2023] remdesivir infusion  100 mg Intravenous Daily    spironolactone  25 mg Oral Daily    valsartan  40 mg Oral BID    vancomycin (VANCOCIN) IVPB  1,000 mg Intravenous Q12H    warfarin  4 mg Oral Daily     PRN Meds:acetaminophen, cyclobenzaprine, dextrose 10%, dextrose 10%, dextrose 10%, dextrose 10%, dextrose, dextrose, glucagon (human recombinant), insulin aspart U-100, morphine, sodium chloride 0.9%, Pharmacy to dose Vancomycin consult **AND** vancomycin - pharmacy to dose    Review of patient's allergies indicates:   Allergen Reactions    Aspirin Other (See Comments)     Mr. Thacker is enrolled in Dr. Paige's Alon Trial and cannot have any aspirin and/or aspirin-containing products. DO NOT  cancel any orders for INV Aspirin 100 mg/Placebo. If you have questions, please contact Isabel @ [phone], [phone],[email or web address], secure chat or MS Teams message.    Bumex [bumetanide] Hives    Lactose Diarrhea     Other reaction(s): Abdominal distension, gaseous    Torsemide Hives     Objective:     Vital Signs (Most Recent):  Temp: 97.1 °F (36.2 °C) (06/03/23 1153)  Pulse: 101 (06/03/23 1114)  Resp: 16 (06/03/23 1153)  BP: (!) 78/0 (06/03/23 1153)  SpO2: 97 % (06/03/23 1153) Vital Signs (24h Range):  Temp:  [97.1 °F (36.2 °C)-102 °F (38.9 °C)] 97.1 °F (36.2 °C)  Pulse:  [] 101  Resp:  [15-24] 16  SpO2:  [95 %-100 %] 97 %  BP: ()/(0-88) 78/0     Patient Vitals for the past 72 hrs (Last 3 readings):   Weight   06/02/23 2327 99.4 kg (219 lb 2.2 oz)     Body mass index is 27.39 kg/m².      Intake/Output Summary (Last 24 hours) at 6/3/2023 1251  Last data filed at 6/3/2023 1153  Gross per 24 hour   Intake 270 ml   Output 875 ml   Net -605 ml       Hemodynamic Parameters:          Physical Exam  Constitutional:       Appearance: Normal appearance.   HENT:      Head: Normocephalic and atraumatic.      Mouth/Throat:      Mouth: Mucous membranes are moist.   Neck:      Comments: No elevation in JVP appreciated  Cardiovascular:      Rate and Rhythm: Normal rate.      Comments: Normal VAD hum can be heard  Pulmonary:      Effort: Pulmonary effort is normal. No respiratory distress.      Breath sounds: Normal breath sounds. No wheezing or rales.   Abdominal:      General: Abdomen is flat.      Tenderness: There is abdominal tenderness. There is guarding.      Comments: DLES with drainage around dressing on R anterior lower abdominal wall   Musculoskeletal:         General: No swelling.      Cervical back: Normal range of motion and neck supple.   Skin:     General: Skin is warm.      Capillary Refill: Capillary refill takes 2 to 3 seconds.      Comments: R sided PICC line in basilic vein    Neurological:      General: No focal deficit present.      Mental Status: He is alert and oriented to person, place, and time.   Psychiatric:         Mood and Affect: Mood normal.         Behavior: Behavior normal.          Significant Labs:  CBC:  Recent Labs   Lab 06/02/23 1651 06/03/23  0532   WBC 13.06* 12.69   RBC 5.29 4.92   HGB 13.3* 12.1*   HCT 41.2* 39.4*    223   MCV 77.9* 80*   MCH 25.1* 24.6*   MCHC 32.3* 30.7*     BNP:  No results for input(s): BNP in the last 168 hours.    Invalid input(s): BNPTRIAGELBLO  CMP:  Recent Labs   Lab 06/02/23 1652 06/03/23  0532   GLU  --  105   CALCIUM 8.9 8.7   ALBUMIN 4.0  --    * 135*   K 4.1 3.9   CO2 21* 21*   CL  --  103   BUN 7.2* 8   CREATININE 1.65* 1.4   ALKPHOS 101  --    ALT 10  --    AST 22  --    BILITOT 1.0  --       Coagulation:   Recent Labs   Lab 06/03/23 0040 06/03/23  0532   INR  --  2.0*   APTT 36.6* 37.5*     LDH:  Recent Labs   Lab 06/03/23  0532        Microbiology:  Microbiology Results (last 7 days)       Procedure Component Value Units Date/Time    Blood culture [[id number]] Collected: 06/03/23 0040    Order Status: Completed Specimen: Blood Updated: 06/03/23 0745     Blood Culture, Routine No Growth to date    Blood culture [[id number]] Collected: 06/03/23 0040    Order Status: Completed Specimen: Blood Updated: 06/03/23 0745     Blood Culture, Routine No Growth to date    Aerobic culture [[id number]] Collected: 06/03/23 0135    Order Status: Sent Specimen: Skin from Abdomen Updated: 06/03/23 0144    Culture, Anaerobe [[id number]] Collected: 06/03/23 0135    Order Status: Sent Specimen: Skin from Abdomen Updated: 06/03/23 0144            I have reviewed all pertinent labs within the past 24 hours.    Estimated Creatinine Clearance: 85.5 mL/min (based on SCr of 1.4 mg/dL).    Diagnostic Results:  I have reviewed and interpreted all pertinent imaging results/findings within the past 24 hours.    Assessment and Plan:      Patient is a 38 yo black male with stage d HFrEF, NICM, ? Familial CM (Father had LVAD and subsequent heart transplant), h/o PSA, DM2 on insulin who is s/p DT-HM3 implantation 6/23/2022, post op course complicated by early RV failure requiring RVAD with ProTek Duo after admitted with ADHF/cardiogenic shock on home milrinone requiring IABP. He underwent RVAD removal and chest closure 6/30/2022.  He also completed a course of IV Abx for staph epi. He was weaned off  but he had to restarted due to RVF. He was eventually transitioned to milrinone (secondary to  shortage) now on 0.25 mcg/kg/min.    He presented to an outside ED with complaints of fever (tmax 101 F), myalgias, and headache since Tuesday,  He also reported drainage from his DLES over the past 2 days.  He has a cough productive of a small amount of clear sputum.  No N/V or nasal congestion.  No VAD alarms.  Spouse also had fever and flu like symptoms over the past week. Otherwise states he has no other issues and thought that he had the flu.      Upon presentation to the ER, vitals stable, satting well on room air.  Labs show a sCr of 1.65 (baseline Cr 1.3), Leukocytosis to 13K, initial lactate normal at 1.9, COVID-19 PCR positive, Influenza PCR negative for A & B, CXR unremarkable.  CT abdomen was obtained to evaluate DLES drainage, shows mild stranding along distal drive line but otherwise unremarkable, no fluid collection.  He was started on vancomycin at the outside ED and was transferred to [facility] for COVID-19 infection in addition to possible LVAD DLES infection.      Home Medications:   Valsartan 40mg BID  Aldactone 25mg daily  Lasix 80mg PO daily (takes 40 BID due to cramping)  Milrinone 0.25 mcg/kg/min      * COVID-19  Patient tested positive for COVID on 6/2/23  Date of symptom onset: 5/30/22  O2 requirement: room air  Abx: vancomycin and zosyn (started on 6/3/23)  Trending inflammatory markers  Start remdesivir      Seizure-like  activity  Avoiding cefepime given hx of seizure with possibility of lowering seizure threshold  Continue keppra 1,000mg BID    LVAD (left ventricular assist device) present  Procedure: Device Interrogation Including analysis of device parameters  Current Settings: Ventricular Assist Device  Review of device function is stable  Continue coumadin at home dose for INR goal of 2.0-3.0, monitor daily in the AM  Suspect DLES infection based on CT  Continue vanc+zosyn pending cultures    TXP LVAD INTERROGATIONS 6/3/2023 6/3/2023 6/2/2023 4/19/2023 4/6/2023 4/6/2023 4/6/2023   Type HeartMate3 HeartMate3 HeartMate3 HeartMate3 HeartMate3 HeartMate3 HeartMate3   Flow 4.1 4.0 4.0 4.0 4.2 3.7 3.9   Speed 5100 5100 5100 5100 5100 5100 5100   PI 4.7 5.1 5.6 6.8 5.8 6.1 6.1   Power (Serna) 3.7 3.7 3.7 3.8 3.7 3.8 3.7   LSL 4700 4700 4700 - 4700 4700 4700   Low Flow Alarm - - - - - - -   High Power Alarm - - - - - - -   Pulsatility - - - - Intermittent pulse - -       Type 2 diabetes mellitus with hyperglycemia  Endocrinology consulted, hx of poorly controlled DM2 with A1c >9  Home regimen of levemir 22mg BID and 16u novolog AC.    Started on sliding scale, novolog 10mg TIDWM and levemir 15mg BID  consult endo for management, appreciate assistance    Acute on chronic combined systolic and diastolic heart failure  NICM s/p DT HM3 on 6/23/2022  On home milrinone at 0.25mcg/kg/min for RV failure, continue  Home Regimen: valsartan 40mg BID, aldactone 25mg BID  Diuretics at home: lasix 40mg BID  Euvolemic  Continue home meds  Maintain electrolytes, strict I/Os, daily weights        Tigist Beebe MD  Heart Transplant  [facility] - Cardiology Stepdown

## 2023-06-03 NOTE — PT/OT/SLP PROGRESS
Physical Therapy Screen and D/C      Patient Name:  Kevan Queen   MRN:  [id number]    Pt tolerated PT screen well.  Pt is functioning at their baseline, IND a this time, and not appropriate for skilled PT and does not require skilled PT at this time.  Orders D/C.     All needs met, all questions answered.

## 2023-06-03 NOTE — ASSESSMENT & PLAN NOTE
Procedure: Device Interrogation Including analysis of device parameters  Current Settings: Ventricular Assist Device  Review of device function is stable  Continue coumadin at home dose for INR goal of 2.0-3.0, monitor daily in the AM  Suspect DLES infection based on CT  Continue vanc+zosyn pending cultures    TXP LVAD INTERROGATIONS 6/3/2023 6/3/2023 6/2/2023 4/19/2023 4/6/2023 4/6/2023 4/6/2023   Type HeartMate3 HeartMate3 HeartMate3 HeartMate3 HeartMate3 HeartMate3 HeartMate3   Flow 4.1 4.0 4.0 4.0 4.2 3.7 3.9   Speed 5100 5100 5100 5100 5100 5100 5100   PI 4.7 5.1 5.6 6.8 5.8 6.1 6.1   Power (Serna) 3.7 3.7 3.7 3.8 3.7 3.8 3.7   LSL 4700 4700 4700 - 4700 4700 4700   Low Flow Alarm - - - - - - -   High Power Alarm - - - - - - -   Pulsatility - - - - Intermittent pulse - -

## 2023-06-03 NOTE — ASSESSMENT & PLAN NOTE
DLES drainage noted with cultures in process. I independently reviewed pt's lab work and images - prior leukocytosis resolved (today 12), COVID PCR positive, and CT abdo without focal fluid collection.     Recommendations:  -continue vancomycin pharm to dose and zosyn pending cx data  -follow up DLES cultures  -monitor renal function while on abx therapy

## 2023-06-03 NOTE — PT/OT/SLP PROGRESS
Occupational Therapy Screen and D/c       Patient Name:  Kevan Queen   MRN:  [id number]    Patient screened by PT this date and is currently performing all mobility and ADLs at [facility], therefore no skilled OT services warranted at this time. OT orders d/c'ed.     6/3/2023

## 2023-06-03 NOTE — PROGRESS NOTES
06/03/2023  John Alaniz    Current provider:  Angela Yang DO    Device interrogation:  TXP LVAD INTERROGATIONS 6/3/2023 6/3/2023 6/3/2023 6/2/2023 4/19/2023 4/6/2023 4/6/2023   Type HeartMate3 HeartMate3 HeartMate3 HeartMate3 HeartMate3 HeartMate3 HeartMate3   Flow 4.4 4.1 4.0 4.0 4.0 4.2 3.7   Speed 5100 5100 5100 5100 5100 5100 5100   PI 3.5 4.7 5.1 5.6 6.8 5.8 6.1   Power (Serna) 3.7 3.7 3.7 3.7 3.8 3.7 3.8   LSL 4700 4700 4700 4700 - 4700 4700   Low Flow Alarm - - - - - - -   High Power Alarm - - - - - - -   Pulsatility Intermittent pulse Intermittent pulse - - - Intermittent pulse -          Rounded on Kevan Queen to ensure all mechanical assist device settings (IABP or VAD) were appropriate and all parameters were within limits.  I was able to ensure all back up equipment was present, the staff had no issues, and the Perfusion Department daily rounding was complete.      For implantable VADs: Interrogation of Ventricular assist device was performed with analysis of device parameters and review of device function. I have personally reviewed the interrogation findings and agree with findings as stated.     In emergency, the nursing units have been notified to contact the perfusion department either by:  Calling [phone] from 630am to 4pm Mon thru Fri, utilizing the On-Call Finder functionality of Epic and searching for Perfusion, or by contacting the hospital  from 4pm to 630am and on weekends and asking to speak with the perfusionist on call.    5:32 PM

## 2023-06-03 NOTE — PROGRESS NOTES
Pharmacokinetic Initial Assessment: IV Vancomycin    Assessment/Plan:    Initiate intravenous vancomycin with loading dose of 2000 mg once (administered in ED) followed by a maintenance dose of vancomycin 1000 mg IV every 12 hours  Desired empiric serum trough concentration is 15 to 20 mcg/mL  Draw vancomycin trough level 60 min prior to fourth dose on 6/4/23 at approximately 05:00 or sooner if clinically indicated.  Pharmacy will continue to follow and monitor vancomycin.      Please contact pharmacy at extension [phone] with any questions regarding this assessment.     Thank you for the consult,   Marcello Dickey       Patient brief summary:  Kevan Queen is a 38 y.o. male initiated on antimicrobial therapy with IV Vancomycin for treatment of suspected bacteremia    Drug Allergies:   Review of patient's allergies indicates:   Allergen Reactions    Aspirin Other (See Comments)     Mr. Thacker is enrolled in Dr. Paige's Alon Trial and cannot have any aspirin and/or aspirin-containing products. DO NOT cancel any orders for INV Aspirin 100 mg/Placebo. If you have questions, please contact Isabel @ [phone], [phone],[email or web address], secure chat or MS Teams message.    Bumex [bumetanide] Hives    Lactose Diarrhea     Other reaction(s): Abdominal distension, gaseous    Torsemide Hives       Actual Body Weight:   99.4 kg    Renal Function:   Estimated Creatinine Clearance: 72.6 mL/min (A) (based on SCr of 1.65 mg/dL (H)).,     Dialysis Method (if applicable):  N/A    CBC (last 72 hours):  Recent Labs   Lab Result Units 06/02/23  1651   WBC x10(3)/mcL 13.06*   Hgb g/dL 13.3*   Hct % 41.2*   Platelet x10(3)/mcL 238   Mono % % 8.9   Eos % % 0.0   Basophil % % 0.3       Metabolic Panel (last 72 hours):  Recent Labs   Lab Result Units 06/02/23  1652 06/02/23  1819   Sodium Level mmol/L 132*  --    Potassium Level mmol/L 4.1  --    Chloride mmol/L 101  --    Carbon Dioxide mmol/L 21*  --    Glucose Level mg/dL 186*  --     Glucose, UA mg/dL  --  Negative   Blood Urea Nitrogen mg/dL 7.2*  --    Creatinine mg/dL 1.65*  --    Albumin Level g/dL 4.0  --    Bilirubin Total mg/dL 1.0  --    Alkaline Phosphatase unit/L 101  --    Aspartate Aminotransferase unit/L 22  --    Alanine Aminotransferase unit/L 10  --        Drug levels (last 3 results):  No results for input(s): VANCOMYCINRA, VANCORANDOM, VANCOMYCINPE, VANCOPEAK, VANCOMYCINTR, VANCOTROUGH in the last 72 hours.    Microbiologic Results:  Microbiology Results (last 7 days)       Procedure Component Value Units Date/Time    Culture, Anaerobe [[id number]]     Order Status: No result Specimen: Skin from Abdomen     Aerobic culture [[id number]]     Order Status: No result Specimen: Skin from Abdomen     Blood culture [[id number]]     Order Status: Sent Specimen: Blood     Blood culture [[id number]]     Order Status: Sent Specimen: Blood

## 2023-06-03 NOTE — ASSESSMENT & PLAN NOTE
Endocrinology consulted, hx of poorly controlled DM2 with A1c >9  Home regimen of levemir 22mg BID and 16u novolog AC.    Started on sliding scale, novolog 10mg TIDWM and levemir 15mg BID  consult endo for management, appreciate assistance

## 2023-06-03 NOTE — ASSESSMENT & PLAN NOTE
NICM s/p DT HM3 on 6/23/2022  On home milrinone at 0.25mcg/kg/min for RV failure, continue  Home Regimen: valsartan 40mg BID, aldactone 25mg BID  Diuretics at home: lasix 40mg BID  Euvolemic  Continue home meds  Maintain electrolytes, strict I/Os, daily weights

## 2023-06-03 NOTE — NURSING
"  LVAD dressing change completed using sterile technique with kit. DLES is a "1" with copious amount of serosanguineous and greenish drainage noted on the drain sponge. Tolerated without any complication. No redness or tenderness noted. Site continues to drain, will complete LVAD dressing change as needed when saturated.     "

## 2023-06-03 NOTE — PLAN OF CARE
AAOX4,VSS,O2 sats>97% on RA. Patient ambulating independently, fall precautions in place. LVAD DP and numbers WNL, smooth LVAD hum. Patient has no complaints of pain/SOB. Pt with milrinone 0.25 mcg/kg/min. Discussed medications and care. Patient has no questions at this time. Pt visualised and stable. Pt resting well,call light within reach, bed at the lowest position.    Problem: Adult Inpatient Plan of Care  Goal: Plan of Care Review  Outcome: Ongoing, Progressing  Goal: Absence of Hospital-Acquired Illness or Injury  Outcome: Ongoing, Progressing  Goal: Optimal Comfort and Wellbeing  Outcome: Ongoing, Progressing     Problem: Diabetes Comorbidity  Goal: Blood Glucose Level Within Targeted Range  Outcome: Ongoing, Progressing     Problem: Infection  Goal: Absence of Infection Signs and Symptoms  Outcome: Ongoing, Progressing     Problem: Fall Injury Risk  Goal: Absence of Fall and Fall-Related Injury  Outcome: Ongoing, Progressing

## 2023-06-03 NOTE — ASSESSMENT & PLAN NOTE
Patient tested positive for COVID on 6/2/23  Date of symptom onset: 5/30/22  O2 requirement: room air on admission, nasal cannula to keep O2 above 92%  Start on decadron 6mg daily x 10 days  Abx: vancomycin and zosyn (started on 6/3/23)  Trending inflammatory markers, hold on remdesivir for now  ID consulted, appreciate recommendations

## 2023-06-03 NOTE — SUBJECTIVE & OBJECTIVE
Interval History: NAEO. Patient feels well and has no new complaints. He is breathing comfortable on room air. Cultures pending. ESR elevated. Currently on vanc+zosyn.    Continuous Infusions:   milrinone 20mg/100ml D5W (200mcg/ml) 0.25 mcg/kg/min (06/03/23 1149)     Scheduled Meds:   albuterol  2 puff Inhalation Q6H    ascorbic acid (vitamin C)  500 mg Oral BID    busPIRone  7.5 mg Oral Daily    famotidine  40 mg Oral Daily    furosemide  40 mg Oral BID    gabapentin  100 mg Oral TID    insulin aspart U-100  10 Units Subcutaneous TIDWM    insulin detemir U-100 (Levemir)  15 Units Subcutaneous BID    INV ASPIRIN 100MG/PLACEBO  100 mg Oral Daily    levETIRAcetam  1,000 mg Oral BID    mirtazapine  15 mg Oral Nightly    multivitamin  1 tablet Oral Daily    piperacillin-tazobactam (ZOSYN) IVPB  4.5 g Intravenous Q8H    remdesivir loading dose infusion  200 mg Intravenous Q24H    Followed by    [START ON 6/4/2023] remdesivir infusion  100 mg Intravenous Daily    spironolactone  25 mg Oral Daily    valsartan  40 mg Oral BID    vancomycin (VANCOCIN) IVPB  1,000 mg Intravenous Q12H    warfarin  4 mg Oral Daily     PRN Meds:acetaminophen, cyclobenzaprine, dextrose 10%, dextrose 10%, dextrose 10%, dextrose 10%, dextrose, dextrose, glucagon (human recombinant), insulin aspart U-100, morphine, sodium chloride 0.9%, Pharmacy to dose Vancomycin consult **AND** vancomycin - pharmacy to dose    Review of patient's allergies indicates:   Allergen Reactions    Aspirin Other (See Comments)     Mr. Thacker is enrolled in Dr. Paige's Alon Trial and cannot have any aspirin and/or aspirin-containing products. DO NOT cancel any orders for INV Aspirin 100 mg/Placebo. If you have questions, please contact Isabel @ [phone], [phone],[email or web address], secure chat or MS Teams message.    Bumex [bumetanide] Hives    Lactose Diarrhea     Other reaction(s): Abdominal distension, gaseous    Torsemide Hives     Objective:     Vital Signs  (Most Recent):  Temp: 97.1 °F (36.2 °C) (06/03/23 1153)  Pulse: 101 (06/03/23 1114)  Resp: 16 (06/03/23 1153)  BP: (!) 78/0 (06/03/23 1153)  SpO2: 97 % (06/03/23 1153) Vital Signs (24h Range):  Temp:  [97.1 °F (36.2 °C)-102 °F (38.9 °C)] 97.1 °F (36.2 °C)  Pulse:  [] 101  Resp:  [15-24] 16  SpO2:  [95 %-100 %] 97 %  BP: ()/(0-88) 78/0     Patient Vitals for the past 72 hrs (Last 3 readings):   Weight   06/02/23 2327 99.4 kg (219 lb 2.2 oz)     Body mass index is 27.39 kg/m².      Intake/Output Summary (Last 24 hours) at 6/3/2023 1251  Last data filed at 6/3/2023 1153  Gross per 24 hour   Intake 270 ml   Output 875 ml   Net -605 ml       Hemodynamic Parameters:          Physical Exam  Constitutional:       Appearance: Normal appearance.   HENT:      Head: Normocephalic and atraumatic.      Mouth/Throat:      Mouth: Mucous membranes are moist.   Neck:      Comments: No elevation in JVP appreciated  Cardiovascular:      Rate and Rhythm: Normal rate.      Comments: Normal VAD hum can be heard  Pulmonary:      Effort: Pulmonary effort is normal. No respiratory distress.      Breath sounds: Normal breath sounds. No wheezing or rales.   Abdominal:      General: Abdomen is flat.      Tenderness: There is abdominal tenderness. There is guarding.      Comments: DLES with drainage around dressing on R anterior lower abdominal wall   Musculoskeletal:         General: No swelling.      Cervical back: Normal range of motion and neck supple.   Skin:     General: Skin is warm.      Capillary Refill: Capillary refill takes 2 to 3 seconds.      Comments: R sided PICC line in basilic vein   Neurological:      General: No focal deficit present.      Mental Status: He is alert and oriented to person, place, and time.   Psychiatric:         Mood and Affect: Mood normal.         Behavior: Behavior normal.          Significant Labs:  CBC:  Recent Labs   Lab 06/02/23  1651 06/03/23  0532   WBC 13.06* 12.69   RBC 5.29 4.92   HGB  13.3* 12.1*   HCT 41.2* 39.4*    223   MCV 77.9* 80*   MCH 25.1* 24.6*   MCHC 32.3* 30.7*     BNP:  No results for input(s): BNP in the last 168 hours.    Invalid input(s): BNPTRIAGELBLO  CMP:  Recent Labs   Lab 06/02/23  1652 06/03/23  0532   GLU  --  105   CALCIUM 8.9 8.7   ALBUMIN 4.0  --    * 135*   K 4.1 3.9   CO2 21* 21*   CL  --  103   BUN 7.2* 8   CREATININE 1.65* 1.4   ALKPHOS 101  --    ALT 10  --    AST 22  --    BILITOT 1.0  --       Coagulation:   Recent Labs   Lab 06/03/23 0040 06/03/23  0532   INR  --  2.0*   APTT 36.6* 37.5*     LDH:  Recent Labs   Lab 06/03/23  0532        Microbiology:  Microbiology Results (last 7 days)       Procedure Component Value Units Date/Time    Blood culture [[id number]] Collected: 06/03/23 0040    Order Status: Completed Specimen: Blood Updated: 06/03/23 0745     Blood Culture, Routine No Growth to date    Blood culture [[id number]] Collected: 06/03/23 0040    Order Status: Completed Specimen: Blood Updated: 06/03/23 0745     Blood Culture, Routine No Growth to date    Aerobic culture [[id number]] Collected: 06/03/23 0135    Order Status: Sent Specimen: Skin from Abdomen Updated: 06/03/23 0144    Culture, Anaerobe [[id number]] Collected: 06/03/23 0135    Order Status: Sent Specimen: Skin from Abdomen Updated: 06/03/23 0144            I have reviewed all pertinent labs within the past 24 hours.    Estimated Creatinine Clearance: 85.5 mL/min (based on SCr of 1.4 mg/dL).    Diagnostic Results:  I have reviewed and interpreted all pertinent imaging results/findings within the past 24 hours.

## 2023-06-03 NOTE — HPI
38 yo male with HM3 6/23/22 c/b early RV failure requiring RVAD (removal/chest closure 6/30/22), CONS bacteremia s/p tx, on IV milrinone at home admitted to [facility] for fever, found to have COVID and DLES drainage. ID consulted for abx recs. Pt reported several day hx of increased DLES drainage with fevers, dry cough, and exposure to sick contact (spouse with flu like symptoms and nephew). Work up notable for COVID PCR positive and CT abdo without evidence of fluid collection. He is currently on vanc, zosyn, and remdesivir. Not on supplemental O2. Denies issues with his chronic R picc.

## 2023-06-03 NOTE — SUBJECTIVE & OBJECTIVE
Interval HPI:   Admitted overnight. BG at goal  Eatin%  Nausea: No  Hypoglycemia and intervention: No  Fever: No  TPN and/or TF: No      PMH, PSH, FH, SH updated and reviewed     ROS:    Review of Systems   Constitutional:  Positive for chills and fever. Negative for unexpected weight change.   Eyes:  Negative for visual disturbance.   Respiratory:  Positive for cough.    Cardiovascular:  Negative for chest pain.   Gastrointestinal:  Negative for nausea and vomiting.   Endocrine: Negative for polydipsia and polyuria.   Musculoskeletal:  Negative for back pain.   Skin:  Negative for rash.   Neurological:  Negative for syncope.   Psychiatric/Behavioral:  Negative for agitation and dysphoric mood.      Current Medications and/or Treatments Impacting Glycemic Control  Immunotherapy:    Immunosuppressants       None          Steroids:   Hormones (From admission, onward)      Start     Stop Route Frequency Ordered    23 0900  dexAMETHasone tablet 6 mg          0859 Oral Daily 23 2309          Pressors:    Autonomic Drugs (From admission, onward)      None          Hyperglycemia/Diabetes Medications:   Antihyperglycemics (From admission, onward)      Start     Stop Route Frequency Ordered    23 0715  insulin aspart U-100 pen 10 Units         -- SubQ 3 times daily with meals 23 0015    23 0001  insulin aspart U-100 pen 0-5 Units         -- SubQ Before meals & nightly PRN 23 2301    23 2330  insulin detemir U-100 (Levemir) pen 15 Units         -- SubQ 2 times daily 23 2258             PHYSICAL EXAMINATION:  Vitals:    23 0813   BP: (!) 86/0   Pulse: 108   Resp: 19   Temp: 100.1 °F (37.8 °C)     Body mass index is 27.39 kg/m².     Physical Exam  Constitutional:       Appearance: He is not ill-appearing.   HENT:      Head: Normocephalic and atraumatic.      Right Ear: External ear normal.      Left Ear: External ear normal.      Nose: Nose normal.   Pulmonary:       Effort: Pulmonary effort is normal. No respiratory distress.   Musculoskeletal:         General: No swelling.      Right lower leg: No edema.      Left lower leg: No edema.   Skin:     Findings: No erythema.   Neurological:      General: No focal deficit present.      Mental Status: He is alert and oriented to person, place, and time.   Psychiatric:         Mood and Affect: Mood normal.         Behavior: Behavior normal.

## 2023-06-03 NOTE — ASSESSMENT & PLAN NOTE
Procedure: Device Interrogation Including analysis of device parameters  Current Settings: Ventricular Assist Device  Review of device function is stable  Continue coumadin at home dose for INR goal of 2.0-3.0, monitor daily in the AM  Suspect DLES infection based on CT showing mild stranding along distal drive line (although no previous CT for comparison) and clinical evidence, will culture DLES and start on empiric vancomycin and zosyn, deescalate once appropriate    TXP LVAD INTERROGATIONS 4/19/2023 4/6/2023 4/6/2023 4/6/2023 4/6/2023 4/6/2023 4/6/2023   Type HeartMate3 HeartMate3 HeartMate3 HeartMate3 HeartMate3 HeartMate3 HeartMate3   Flow 4.0 4.2 3.7 3.9 3.7 4.1 4.0   Speed 5100 5100 5100 5100 5100 5100 5100   PI 6.8 5.8 6.1 6.1 5.8 6.0 6.0   Power (Serna) 3.8 3.7 3.8 3.7 3.8 3.7 3.7   LSL - 4700 4700 4700 4700 4700 4700   Low Flow Alarm - - - - - - -   High Power Alarm - - - - - - -   Pulsatility - Intermittent pulse - - - Intermittent pulse Intermittent pulse

## 2023-06-03 NOTE — CONSULTS
[facility] - Cardiology Stepdown  Endocrinology  Diabetes Consult Note    Consult Requested by: Angela Yang DO   Reason for admit: COVID-19    HISTORY OF PRESENT ILLNESS:  Reason for Consult: Management of T2DM, Hyperglycemia      Surgical Procedure and Date: LVAD 2022     Diabetes diagnosis year:      Home Diabetes Medications:   -Levemir 22 units BID.   -Novolog 16 units TIDWM in addition to the following correction scale:     150 - 200 + 1 unit     201 - 250 + 2 units     251 - 300 + 3 units     301 - 350 + 4 units      > 350   + 5 units     How often checking glucose at home?  Uses Freestyle William    BG readings on regimen: 92-180s  Hypoglycemia on the regimen?  No  Missed doses on regimen?  occasionally skips mealsn and will skip Novolog with breakfast      Diabetes Complications include:     Hyperglycemia     Complicating diabetes co morbidities:   History of MI, CHF, and CKD        HPI:   Pt is a 39 yo M w/ stage d HFrEF, NICM, ? Familial CM (Father had LVAD and subsequent heart transplant), h/o PSA, DM2 on insulin who is s/p DT-HM3 implantation 2022, post op course complicated by early RV failure requiring RVAD with ProTek Duo after admitted with ADHF/cardiogenic shock on home milrinone requiring IABP. He underwent RVAD removal and chest closure 2022. Presents w/ F and DLES drainage.  COVID positive.  Endocrine consulted for BG management.      Interval HPI:   Admitted overnight. BG at goal  Eatin%  Nausea: No  Hypoglycemia and intervention: No  Fever: No  TPN and/or TF: No      PMH, PSH, FH, SH updated and reviewed     ROS:    Review of Systems   Constitutional:  Positive for chills and fever. Negative for unexpected weight change.   Eyes:  Negative for visual disturbance.   Respiratory:  Positive for cough.    Cardiovascular:  Negative for chest pain.   Gastrointestinal:  Negative for nausea and vomiting.   Endocrine: Negative for polydipsia and polyuria.    Musculoskeletal:  Negative for back pain.   Skin:  Negative for rash.   Neurological:  Negative for syncope.   Psychiatric/Behavioral:  Negative for agitation and dysphoric mood.      Current Medications and/or Treatments Impacting Glycemic Control  Immunotherapy:    Immunosuppressants       None          Steroids:   Hormones (From admission, onward)      Start     Stop Route Frequency Ordered    06/03/23 0900  dexAMETHasone tablet 6 mg         06/13 0859 Oral Daily 06/02/23 2309          Pressors:    Autonomic Drugs (From admission, onward)      None          Hyperglycemia/Diabetes Medications:   Antihyperglycemics (From admission, onward)      Start     Stop Route Frequency Ordered    06/03/23 0715  insulin aspart U-100 pen 10 Units         -- SubQ 3 times daily with meals 06/03/23 0015    06/03/23 0001  insulin aspart U-100 pen 0-5 Units         -- SubQ Before meals & nightly PRN 06/02/23 2301    06/02/23 2330  insulin detemir U-100 (Levemir) pen 15 Units         -- SubQ 2 times daily 06/02/23 2258             PHYSICAL EXAMINATION:  Vitals:    06/03/23 0813   BP: (!) 86/0   Pulse: 108   Resp: 19   Temp: 100.1 °F (37.8 °C)     Body mass index is 27.39 kg/m².     Physical Exam  Constitutional:       Appearance: He is not ill-appearing.   HENT:      Head: Normocephalic and atraumatic.      Right Ear: External ear normal.      Left Ear: External ear normal.      Nose: Nose normal.   Pulmonary:      Effort: Pulmonary effort is normal. No respiratory distress.   Musculoskeletal:         General: No swelling.      Right lower leg: No edema.      Left lower leg: No edema.   Skin:     Findings: No erythema.   Neurological:      General: No focal deficit present.      Mental Status: He is alert and oriented to person, place, and time.   Psychiatric:         Mood and Affect: Mood normal.         Behavior: Behavior normal.            Labs Reviewed and Include   Recent Labs   Lab 06/02/23  1652 06/03/23  0532   GLU  --  105    CALCIUM 8.9 8.7   ALBUMIN 4.0  --    * 135*   K 4.1 3.9   CO2 21* 21*   CL  --  103   BUN 7.2* 8   CREATININE 1.65* 1.4   ALKPHOS 101  --    ALT 10  --    AST 22  --    BILITOT 1.0  --      Lab Results   Component Value Date    WBC 12.69 06/03/2023    HGB 12.1 (L) 06/03/2023    HCT 39.4 (L) 06/03/2023    MCV 80 (L) 06/03/2023     06/03/2023     No results for input(s): TSH, FREET4 in the last 168 hours.  Lab Results   Component Value Date    HGBA1C 7.9 (H) 04/02/2023       Nutritional status:   Body mass index is 27.39 kg/m².  Lab Results   Component Value Date    ALBUMIN 4.0 06/02/2023    ALBUMIN 4.2 04/19/2023    ALBUMIN 3.8 04/19/2023     Lab Results   Component Value Date    PREALBUMIN 26 04/19/2023    PREALBUMIN 22 04/19/2023    PREALBUMIN 21 03/23/2023       Estimated Creatinine Clearance: 85.5 mL/min (based on SCr of 1.4 mg/dL).    Accu-Checks  Recent Labs     06/02/23  2339 06/03/23  0853 06/03/23  1156   POCTGLUCOSE 144* 131* 136*        ASSESSMENT and PLAN    Cardiac/Vascular  LVAD (left ventricular assist device) present  Managed by primary team      ID  * COVID-19  Managed by primary team.  Optimize bg control      Endocrine  Type 2 diabetes mellitus with hyperglycemia  BG goal: 140-180  T2DM.  LVAD.  COVID on steroids.  DLES infxn concerns.  BG stable on admit.  Currently on reduction of home dose.  Will continue to monitor on regimen.  May require more prandial insulin with steroids.    - Continue Levemir 15 units BID,  - Continue Novolog 10 units AC  - Adjust Novolog correction dose with ISF 25 starting at 150  given steroids  - POCT Glucose before meals and at bedtime  - Hypoglycemia protocol in place      ** Please notify Endocrine for any change and/or advance in diet**  ** Please call Endocrine for any BG related issues **     Discharge Planning:   TBD. Please notify endocrinology prior to discharge.              Plan discussed with patient, family, and RN at bedside.      Wilmer Quesada PA-C  Endocrinology  [facility] - Cardiology Stepdown

## 2023-06-03 NOTE — NURSING
Pt apart of aspirin trial with trial bottle of aspirin. Pt forgot medication at home. Pharmacist aware and attempted to get into contact with research team. Medication held until new trial medication is able to be obtained.

## 2023-06-03 NOTE — ASSESSMENT & PLAN NOTE
NICM s/p DT HM3 on 6/23/2022  On home milrinone at 0.25mcg/kg/min for RV failure, continue  Home Regimen: valsartan 40mg BID, aldactone 25mg BID  Diuretics at home: lasix 40mg BID  Patient appears euvolemic today and compensated, will resume GDMT and lasix at home dose  Maintain electrolytes, strict I/Os, daily weights

## 2023-06-03 NOTE — ASSESSMENT & PLAN NOTE
Avoiding cefepime given hx of seizure with possibility of lowering seizure threshold  Continue keppra 1,000mg BID

## 2023-06-03 NOTE — CONSULTS
[facility] - [facility]iology Stepdown  Infectious Disease  Consult Note    Patient Name: Kevan Queen  MRN: [id number]  Admission Date: 6/2/2023  Hospital Length of Stay: 1 days  Attending Physician: Angela Yang DO  Primary Care Provider: ORALIA Cline     Isolation Status: No active isolations    Patient information was obtained from patient, past medical records, ER records and primary team.      Inpatient consult to Infectious Diseases  Consult performed by: Laura Baldwin MD  Consult ordered by: Mukul Wilkinson MD      Assessment/Plan:     Cardiac/Vascular  LVAD (left ventricular assist device) present  DLES drainage noted with cultures in process. I independently reviewed pt's lab work and images - prior leukocytosis resolved (today 12), COVID PCR positive, and CT abdo without focal fluid collection.     Recommendations:  -continue vancomycin pharm to dose and zosyn pending cx data  -follow up DLES cultures  -monitor renal function while on abx therapy     ID  * COVID-19  +sick contact and pt was found to have COVID positive PCR. I independently reviewed his CXR - no focal consolidation. Not hypoxic.     Recommendations:  -continue rem  per hospital protocol given high risk  -covid precautions per infection control  -monitor for hepatotoxicity with daily CMP while on remdesivir          Thank you for your consult. I will follow-up with patient. Please contact us if you have any additional questions.  Above d/w primary team.     Laura Baldwin MD  Infectious Disease  [facility] - Cardiology Stepdown    Subjective:     Principal Problem: COVID-19    HPI: 36 yo male with HM3 6/23/22 c/b early RV failure requiring RVAD (removal/chest closure 6/30/22), CONS bacteremia s/p tx, on IV milrinone at home [place] [facility] [place] found to have COVID and DLES drainage. ID consulted for abx recs. Pt reported several day hx of increased DLES drainage with fevers, dry cough, and exposure to sick contact (spouse  with flu like symptoms and nephew). Work up notable for COVID PCR positive and CT abdo without evidence of fluid collection. He is currently on vanc, zosyn, and remdesivir. Not on supplemental O2. Denies issues with his chronic R picc.         Past Medical History:   Diagnosis Date    Arthritis     Cardiomyopathy     CHF (congestive heart failure) 10/01/2020    Diabetes mellitus     Dilated cardiomyopathy 10/26/2020    Drug abuse 10/2020    Hyperosmolar hyperglycemic state (HHS) 5/25/2022    ICD (implantable cardioverter-defibrillator) in place 10/26/2020    Muscle cramping 6/15/2022    Renal disorder        Past Surgical History:   Procedure Laterality Date    APPLICATION OF WOUND VACUUM-ASSISTED CLOSURE DEVICE N/A 6/30/2022    Procedure: APPLICATION, WOUND VAC;  Surgeon: Luis F Paige MD;  Location: [facility] OR [address] [place] [address];  Service: Cardiovascular;  Laterality: N/A;  50 x 5 cm    CARDIAC DEFIBRILLATOR PLACEMENT      IMPLANTATION OF RIGHT VENTRICULAR ASSIST DEVICE (RVAD) N/A 6/29/2022    Procedure: INSERTION, RVAD;  Surgeon: Luis F Paige MD;  Location: [facility] OR [address] [place] [address];  Service: Cardiovascular;  Laterality: N/A;    INSERTION OF GRAFT TO PERICARDIUM Right 6/30/2022    Procedure: INSERTION-RIGHT VENTRICULAR ASSIST DEVICE;  Surgeon: Luis F Paige MD;  [facility];  Service: Cardiovascular;  Laterality: Right;    IRRIGATION OF MEDIASTINUM  6/30/2022    Procedure: IRRIGATION, MEDIASTINUM;  Surgeon: Luis F Paige MD;  [facility];  Service: Cardiovascular;;    LEFT VENTRICULAR ASSIST DEVICE Left 6/23/2022    Procedure: INSERTION-LEFT VENTRICULAR ASSIST DEVICE;  Surgeon: Luis F Paige MD;  [facility];  Service: Cardiovascular;  Laterality: Left;    LEFT VENTRICULAR ASSIST DEVICE N/A 6/29/2022    Procedure: INSERTION-LEFT VENTRICULAR ASSIST DEVICE;  Surgeon: Luis F Paige MD;  Location: [facility] OR [address] [place] [address];  Service: Cardiovascular;  Laterality: N/A;    RIGHT HEART CATHETERIZATION  Right 4/8/2022    Procedure: INSERTION, CATHETER, RIGHT HEART;  Surgeon: Luca Lopez Jr., MD;  Location: [facility] CATH LAB;  Service: Cardiology;  Laterality: Right;    RIGHT HEART CATHETERIZATION Right 4/19/2022    Procedure: INSERTION, CATHETER, RIGHT HEART;  Surgeon: Josh Pulido MD;  Location: [facility] CATH LAB;  Service: Cardiology;  Laterality: Right;    RIGHT HEART CATHETERIZATION Right 7/21/2022    Procedure: INSERTION, CATHETER, RIGHT HEART;  Surgeon: Dalia Crum MD;  Location: [facility] CATH LAB;  Service: Cardiology;  Laterality: Right;    RIGHT HEART CATHETERIZATION Right 10/31/2022    Procedure: INSERTION, CATHETER, RIGHT HEART;  Surgeon: Dalia Crum MD;  Location: [facility] CATH LAB;  Service: Cardiology;  Laterality: Right;    STERNAL WOUND CLOSURE N/A 6/30/2022    Procedure: CLOSURE, WOUND, STERNUM;  Surgeon: Luis F Paige MD;  Location: [facility] OR [address] [place] [address];  Service: Cardiovascular;  Laterality: N/A;       Review of patient's allergies indicates:   Allergen Reactions    Aspirin Other (See Comments)     Mr. Thacker is enrolled in Dr. Paige's Alon Trial and cannot have any aspirin and/or aspirin-containing products. DO NOT cancel any orders for INV Aspirin 100 mg/Placebo. If you have questions, please contact Isabel @ [phone], [email or web address], secure chat or MS Teams message.    Bumex [bumetanide] Hives    Lactose Diarrhea     Other reaction(s): Abdominal distension, gaseous    Torsemide Hives       Medications:  Medications Prior to Admission   Medication Sig    blood sugar diagnostic Strp Use to test blood glucose 4 (four) times daily.    blood-glucose meter Misc Use as instructed    busPIRone (BUSPAR) 7.5 MG tablet Take 1 tablet (7.5 mg total) by mouth once daily at 6am.    famotidine (PEPCID) 40 MG tablet Take 1 tablet (40 mg total) by mouth once daily.    ferrous gluconate 324 mg (37.5 mg iron) Tab tablet Take 1 tablet (324 mg total) by mouth daily with breakfast.     furosemide (LASIX) 80 MG tablet Take 1 tablet (80 mg total) by mouth once daily.    gabapentin (NEURONTIN) 100 MG capsule Take 1 capsule (100 mg total) by mouth 3 (three) times daily.    HYDROcodone-acetaminophen (NORCO) 5-325 mg per tablet Take 1 tablet by mouth every 8 (eight) hours as needed for Pain.    insulin aspart U-100 (NOVOLOG) 100 unit/mL (3 mL) InPn pen Inject 16 Units into the skin 3 (three) times daily with meals. Plus Sliding Scale: 151-200: +1, 201-250: +2, 251-300: +3, 301-350: +4 and call MD; Max Daily Dose: 60 units    insulin detemir U-100 (LEVEMIR FLEXTOUCH) 100 unit/mL (3 mL) SubQ InPn pen Inject 22 Units into the skin 2 (two) times daily.    INV ASPIRIN 100MG/PLACEBO Take 1 capsule (100 mg) by mouth once daily. FOR INVESTIGATIONAL USE ONLY. Protocol: GASTON III subject # 8034.    lancets 33 gauge Misc Use to test blood glucose 4 (four) times daily.    levETIRAcetam (KEPPRA) 1000 MG tablet Take 1 tablet (1,000 mg total) by mouth 2 (two) times daily.    milrinone 20mg/100ml D5W, 200mcg/ml, (PRIMACOR) 20 mg/100 mL (200 mcg/mL) infusion Inject 34.875 mcg/min into the vein continuous.    mirtazapine (REMERON) 15 MG tablet Take 1 tablet (15 mg total) by mouth nightly.    potassium chloride SA (K-DUR,KLOR-CON) 20 MEQ tablet Take 2 tablets (40 mEq total) by mouth 3 (three) times daily.    spironolactone (ALDACTONE) 25 MG tablet Take 1 tablet (25 mg total) by mouth once daily.    valsartan (DIOVAN) 40 MG tablet Take 1 tablet (40 mg total) by mouth 2 (two) times daily.    warfarin (COUMADIN) 3 MG tablet Take 1.5 tablets (4.5mg) orally daily, except 2 tablets (6mg) on Wednesdays     Antibiotics (From admission, onward)      Start     Stop Route Frequency Ordered    06/03/23 0600  vancomycin (VANCOCIN) 1,000 mg in dextrose 5 % (D5W) 250 mL IVPB (Vial-Mate)         -- IV Every 12 hours (non-standard times) 06/03/23 0036    06/03/23 0115  piperacillin-tazobactam (ZOSYN) 4.5 g in dextrose 5 % in water (D5W) 5  % 100 mL IVPB (MB+)         -- IV Every 8 hours (non-standard times) 23 0013    23 0057  vancomycin - pharmacy to dose  (vancomycin IVPB)        See Hyperspace for full Linked Orders Report.    -- IV pharmacy to manage frequency 23 2357          Antifungals (From admission, onward)      None          Antivirals (From admission, onward)          Stop Route Frequency     remdesivir 100 mg        See Hyperspace for full Linked Orders Report.     0859 IV Daily     remdesivir 100 mg        See Hyperspace for full Linked Orders Report.     1214 IV Every 24 hours (non-standard times)             Immunization History   Administered Date(s) Administered    COVID-19, MRNA, LN-S, PF (Pfizer) (Purple Cap) 2021, 2021    DTP 1985, 1985, 1985, 1987    HIB 1990    Influenza - Quadrivalent - PF *Preferred* (6 months and older) 10/16/2020, 2021, 2022    MMR 1987    OPV 1985, 1985, 1987       Family History       Problem Relation (Age of Onset)    Diverticulosis Brother    Heart attack Maternal Grandmother, Maternal Grandfather    Heart failure Father          Social History     Socioeconomic History    Marital status: Legally    Tobacco Use    Smoking status: Former     Packs/day: 0.50     Years: 16.00     Pack years: 8.00     Types: Cigarettes     Start date: 10/1/2004     Quit date: 2021     Years since quittin.1    Smokeless tobacco: Never   Substance and Sexual Activity    Alcohol use: Not Currently    Drug use: Not Currently     Types: Marijuana, MDMA (Ecstacy)    Sexual activity: Yes     Partners: Female     Birth control/protection: None     Review of Systems   Constitutional:  Positive for fever. Negative for chills.   Respiratory:  Positive for cough.    Skin:  Positive for wound.   Objective:     Vital Signs (Most Recent):  Temp: 97.1 °F (36.2 °C) (23 1153)  Pulse: 101 (23 1114)  Resp: 16  (06/03/23 1153)  BP: (!) 78/0 (06/03/23 1153)  SpO2: 97 % (06/03/23 1153) Vital Signs (24h Range):  Temp:  [97.1 °F (36.2 °C)-102 °F (38.9 °C)] 97.1 °F (36.2 °C)  Pulse:  [] 101  Resp:  [15-24] 16  SpO2:  [95 %-100 %] 97 %  BP: ()/(0-88) 78/0     Weight: 99.4 kg (219 lb 2.2 oz)  Body mass index is 27.39 kg/m².    Estimated Creatinine Clearance: 85.5 mL/min (based on SCr of 1.4 mg/dL).     Physical Exam  Constitutional:       General: He is not in acute distress.     Appearance: He is not ill-appearing or toxic-appearing.   Eyes:      General: No scleral icterus.        Right eye: No discharge.         Left eye: No discharge.   Cardiovascular:      Comments: R defib  Pulmonary:      Effort: Pulmonary effort is normal. No respiratory distress.      Breath sounds: No rhonchi.   Abdominal:      General: There is no distension.      Palpations: Abdomen is soft.      Tenderness: There is no abdominal tenderness.      Comments: DLES dressing with serosang fluid   Musculoskeletal:      Right lower leg: No edema.      Left lower leg: No edema.   Skin:     General: Skin is warm and dry.      Coloration: Skin is not jaundiced.      Findings: No bruising.      Comments: R picc     Neurological:      Mental Status: He is alert and oriented to person, place, and time.        Significant Labs:   Microbiology Results (last 7 days)       Procedure Component Value Units Date/Time    Blood culture [[id number]] Collected: 06/03/23 0040    Order Status: Completed Specimen: Blood Updated: 06/03/23 0745     Blood Culture, Routine No Growth to date    Blood culture [[id number]] Collected: 06/03/23 0040    Order Status: Completed Specimen: Blood Updated: 06/03/23 0745     Blood Culture, Routine No Growth to date    Aerobic culture [[id number]] Collected: 06/03/23 0135    Order Status: Sent Specimen: Skin from Abdomen Updated: 06/03/23 0144    Culture, Anaerobe [[id number]] Collected: 06/03/23 0135    Order Status: Sent Specimen:  Skin from Abdomen Updated: 06/03/23 0144            Significant Imaging: I have reviewed all pertinent imaging results/findings within the past 24 hours.

## 2023-06-03 NOTE — H&P
[facility] - Cardiology Stepdown  Heart Transplant  H&P    Patient Name: Kevan Queen  MRN: [id number]  Admission Date: 6/2/2023  Attending Physician: Angela Yang DO  Primary Care Provider: ORALIA Cline  Principal Problem:COVID-19    Subjective:     History of Present Illness:  Patient is a 38 yo black male with stage d HFrEF, NICM, ? Familial CM (Father had LVAD and subsequent heart transplant), h/o PSA, DM2 on insulin who is s/p DT-HM3 implantation 6/23/2022, post op course complicated by early RV failure requiring RVAD with ProTek Duo after admitted with ADHF/cardiogenic shock on home milrinone requiring IABP. He underwent RVAD removal and chest closure 6/30/2022.  He also completed a course of IV Abx for staph epi. He was weaned off  but he had to restarted due to RVF. He was eventually transitioned to milrinone (secondary to  shortage) now on 0.25 mcg/kg/min.    He presented to an outside ED with complaints of fever (tmax 101 F), myalgias, and headache since Tuesday,  He also reported drainage from his DLES over the past 2 days.  He has a cough productive of a small amount of clear sputum.  No N/V or nasal congestion.  No VAD alarms.  Spouse also had fever and flu like symptoms over the past week. Otherwise states he has no other issues and thought that he had the flu.      Upon presentation to the ER, vitals stable, satting well on room air.  Labs show a sCr of 1.65 (baseline Cr 1.3), Leukocytosis to 13K, initial lactate normal at 1.9, COVID-19 PCR positive, Influenza PCR negative for A & B, CXR unremarkable.  CT abdomen was obtained to evaluate DLES drainage, shows mild stranding along distal drive line but otherwise unremarkable, no fluid collection.  He was started on vancomycin at the outside ED and was [place] [facility] [place] in addition to possible LVAD DLES infection.      Home Medications:   Valsartan 40mg BID  Aldactone 25mg daily  Lasix 80mg PO daily (takes 40 BID  due to cramping)  Milrinone 0.25 mcg/kg/min      Past Medical History:   Diagnosis Date    Arthritis     Cardiomyopathy     CHF (congestive heart failure) 10/01/2020    Diabetes mellitus     Dilated cardiomyopathy 10/26/2020    Drug abuse 10/2020    Hyperosmolar hyperglycemic state (HHS) 5/25/2022    ICD (implantable cardioverter-defibrillator) in place 10/26/2020    Muscle cramping 6/15/2022    Renal disorder        Past Surgical History:   Procedure Laterality Date    APPLICATION OF WOUND VACUUM-ASSISTED CLOSURE DEVICE N/A 6/30/2022    Procedure: APPLICATION, WOUND VAC;  Surgeon: Luis F Paige MD;  Location: [facility] OR [address] [place] [address];  Service: Cardiovascular;  Laterality: N/A;  50 x 5 cm    CARDIAC DEFIBRILLATOR PLACEMENT      IMPLANTATION OF RIGHT VENTRICULAR ASSIST DEVICE (RVAD) N/A 6/29/2022    Procedure: INSERTION, RVAD;  Surgeon: Luis F Paige MD;  [facility];  Service: Cardiovascular;  Laterality: N/A;    INSERTION OF GRAFT TO PERICARDIUM Right 6/30/2022    Procedure: INSERTION-RIGHT VENTRICULAR ASSIST DEVICE;  Surgeon: Luis F Paige MD;  [facility];  Service: Cardiovascular;  Laterality: Right;    IRRIGATION OF MEDIASTINUM  6/30/2022    Procedure: IRRIGATION, MEDIASTINUM;  Surgeon: Luis F Paige MD;  [facility];  Service: Cardiovascular;;    LEFT VENTRICULAR ASSIST DEVICE Left 6/23/2022    Procedure: INSERTION-LEFT VENTRICULAR ASSIST DEVICE;  Surgeon: Luis F Paige MD;  [facility];  Service: Cardiovascular;  Laterality: Left;    LEFT VENTRICULAR ASSIST DEVICE N/A 6/29/2022    Procedure: INSERTION-LEFT VENTRICULAR ASSIST DEVICE;  Surgeon: Luis F Paige MD;  [facility];  Service: Cardiovascular;  Laterality: N/A;    RIGHT HEART CATHETERIZATION Right 4/8/2022    Procedure: INSERTION, CATHETER, RIGHT HEART;  Surgeon: Luca Lopez Jr., MD;  Location: [facility] CATH LAB;  Service: Cardiology;  Laterality: Right;    RIGHT  HEART CATHETERIZATION Right 4/19/2022    Procedure: INSERTION, CATHETER, RIGHT HEART;  Surgeon: Josh Pulido MD;  Location: [facility] CATH LAB;  Service: Cardiology;  Laterality: Right;    RIGHT HEART CATHETERIZATION Right 7/21/2022    Procedure: INSERTION, CATHETER, RIGHT HEART;  Surgeon: Dalia Crum MD;  Location: [facility] CATH LAB;  Service: Cardiology;  Laterality: Right;    RIGHT HEART CATHETERIZATION Right 10/31/2022    Procedure: INSERTION, CATHETER, RIGHT HEART;  Surgeon: Dalia Crum MD;  Location: [facility] CATH LAB;  Service: Cardiology;  Laterality: Right;    STERNAL WOUND CLOSURE N/A 6/30/2022    Procedure: CLOSURE, WOUND, STERNUM;  Surgeon: Luis F Paige MD;  Location: [facility] OR [address] [place] [address];  Service: Cardiovascular;  Laterality: N/A;       Review of patient's allergies indicates:   Allergen Reactions    Aspirin Other (See Comments)     Mr. Thacker is enrolled in Dr. Paige's Alon Trial and cannot have any aspirin and/or aspirin-containing products. DO NOT cancel any orders for INV Aspirin 100 mg/Placebo. If you have questions, please contact Isabel @ [phone], [phone],[email or web address], secure chat or MS Teams message.    Bumex [bumetanide] Hives    Lactose Diarrhea     Other reaction(s): Abdominal distension, gaseous    Torsemide Hives       Current Facility-Administered Medications   Medication    albuterol inhaler 2 puff    ascorbic acid (vitamin C) tablet 500 mg    busPIRone split tablet 7.5 mg    ceFEPIme (MAXIPIME) 2 g in dextrose 5 % in water (D5W) 5 % 50 mL IVPB (MB+)    cyclobenzaprine tablet 5 mg    dexAMETHasone tablet 6 mg    dextrose 10% bolus 125 mL 125 mL    dextrose 10% bolus 125 mL 125 mL    dextrose 10% bolus 250 mL 250 mL    dextrose 10% bolus 250 mL 250 mL    dextrose 40 % gel 15,000 mg    dextrose 40 % gel 30,000 mg    famotidine tablet 40 mg    gabapentin capsule 100 mg    glucagon (human recombinant) injection 1 mg    insulin aspart U-100 pen 0-5  Units    insulin detemir U-100 (Levemir) pen 15 Units    INV ASPIRIN 100MG/PLACEBO 100 mg    levETIRAcetam tablet 1,000 mg    milrinone 20mg in D5W 100 mL infusion    mirtazapine tablet 15 mg    multivitamin tablet    sodium chloride 0.9% flush 10 mL    spironolactone tablet 25 mg    valsartan tablet 40 mg    vancomycin - pharmacy to dose    warfarin (COUMADIN) tablet 4 mg     Family History       Problem Relation (Age of Onset)    Diverticulosis Brother    Heart attack Maternal Grandmother, Maternal Grandfather    Heart failure Father          Tobacco Use    Smoking status: Former     Packs/day: 0.50     Years: 16.00     Pack years: 8.00     Types: Cigarettes     Start date: 10/1/2004     Quit date: 2021     Years since quittin.1    Smokeless tobacco: Never   Substance and Sexual Activity    Alcohol use: Not Currently    Drug use: Not Currently     Types: Marijuana, MDMA (Ecstacy)    Sexual activity: Yes     Partners: Female     Birth control/protection: None     Review of Systems   Constitutional:  Positive for chills and fever. Negative for activity change, appetite change, fatigue and unexpected weight change.   HENT:  Positive for rhinorrhea. Negative for sinus pain, sore throat and trouble swallowing.    Respiratory:  Positive for cough. Negative for chest tightness and shortness of breath.    Cardiovascular:  Positive for chest pain. Negative for palpitations and leg swelling.   Gastrointestinal:  Negative for abdominal distention, constipation, diarrhea, nausea and vomiting.   Genitourinary:  Negative for decreased urine volume.   Musculoskeletal:  Negative for myalgias.   Neurological:  Positive for headaches. Negative for dizziness, seizures, syncope and weakness.   Psychiatric/Behavioral:  Negative for behavioral problems.    Objective:     Vital Signs (Most Recent):  Temp: 98.7 °F (37.1 °C) (23)  Pulse: 65 (23)  Resp: 19 (23)  BP: (!) 82/0  (06/02/23 2351)  SpO2: 96 % (06/02/23 2327) Vital Signs (24h Range):  Temp:  [98.7 °F (37.1 °C)-102 °F (38.9 °C)] 98.7 °F (37.1 °C)  Pulse:  [] 65  Resp:  [15-24] 19  SpO2:  [96 %-100 %] 96 %  BP: ()/(0-88) 82/0     Patient Vitals for the past 72 hrs (Last 3 readings):   Weight   06/02/23 2327 99.4 kg (219 lb 2.2 oz)     Body mass index is 27.39 kg/m².    No intake or output data in the 24 hours ending 06/03/23 0003       Physical Exam  Constitutional:       Appearance: Normal appearance.   HENT:      Head: Normocephalic and atraumatic.      Mouth/Throat:      Mouth: Mucous membranes are moist.   Neck:      Comments: No elevation in JVP appreciated  Cardiovascular:      Rate and Rhythm: Normal rate.      Comments: Normal VAD hum can be heard  Pulmonary:      Effort: Pulmonary effort is normal. No respiratory distress.      Breath sounds: Normal breath sounds. No wheezing or rales.   Abdominal:      General: Abdomen is flat.      Palpations: Abdomen is soft.      Comments: DLES with drainage around dressing on R anterior lower abdominal wall   Musculoskeletal:         General: No swelling.      Cervical back: Normal range of motion and neck supple.   Skin:     General: Skin is warm.      Capillary Refill: Capillary refill takes 2 to 3 seconds.      Comments: R sided PICC line in basilic vein   Neurological:      General: No focal deficit present.      Mental Status: He is alert and oriented to person, place, and time.   Psychiatric:         Mood and Affect: Mood normal.         Behavior: Behavior normal.          Significant Labs:  CBC:  Recent Labs   Lab 06/02/23  1651   WBC 13.06*   RBC 5.29   HGB 13.3*   HCT 41.2*      MCV 77.9*   MCH 25.1*   MCHC 32.3*     BNP:  No results for input(s): BNP in the last 168 hours.    Invalid input(s): BNPTRIAGELBLO  CMP:  Recent Labs   Lab 06/02/23  1652   CALCIUM 8.9   ALBUMIN 4.0   *   K 4.1   CO2 21*   BUN 7.2*   CREATININE 1.65*   ALKPHOS 101   ALT 10    AST 22   BILITOT 1.0      Coagulation:   No results for input(s): PT, INR, APTT in the last 168 hours.  LDH:  No results for input(s): LDH in the last 72 hours.  Microbiology:  Microbiology Results (last 7 days)       Procedure Component Value Units Date/Time    Culture, Anaerobe [[id number]]     Order Status: No result Specimen: Skin from Abdomen     Aerobic culture [[id number]]     Order Status: No result Specimen: Skin from Abdomen     Blood culture [[id number]]     Order Status: Sent Specimen: Blood     Blood culture [[id number]]     Order Status: Sent Specimen: Blood             I have reviewed all pertinent labs within the past 24 hours.    Diagnostic Results:  I have reviewed all pertinent imaging results/findings within the past 24 hours.      Assessment/Plan:     * COVID-19  Patient tested positive for COVID on 6/2/23  Date of symptom onset: 5/30/22  O2 requirement: room air on admission, nasal cannula to keep O2 above 92%  Start on decadron 6mg daily x 10 days  Abx: vancomycin and zosyn (started on 6/3/23)  Trending inflammatory markers, hold on remdesivir for now  ID consulted, appreciate recommendations      Seizure-like activity  Avoiding cefepime given hx of seizure with possibility of lowering seizure threshold  Continue keppra 1,000mg BID    LVAD (left ventricular assist device) present  Procedure: Device Interrogation Including analysis of device parameters  Current Settings: Ventricular Assist Device  Review of device function is stable  Continue coumadin at home dose for INR goal of 2.0-3.0, monitor daily in the AM  Suspect DLES infection based on CT showing mild stranding along distal drive line (although no previous CT for comparison) and clinical evidence, will culture DLES and start on empiric vancomycin and zosyn, deescalate once appropriate    TXP LVAD INTERROGATIONS 4/19/2023 4/6/2023 4/6/2023 4/6/2023 4/6/2023 4/6/2023 4/6/2023   Type HeartMate3 HeartMate3 HeartMate3 HeartMate3 HeartMate3  HeartMate3 HeartMate3   Flow 4.0 4.2 3.7 3.9 3.7 4.1 4.0   Speed 5100 5100 5100 5100 5100 5100 5100   PI 6.8 5.8 6.1 6.1 5.8 6.0 6.0   Power (Serna) 3.8 3.7 3.8 3.7 3.8 3.7 3.7   LSL - 4700 4700 4700 4700 4700 4700   Low Flow Alarm - - - - - - -   High Power Alarm - - - - - - -   Pulsatility - Intermittent pulse - - - Intermittent pulse Intermittent pulse       Type 2 diabetes mellitus with hyperglycemia  Endocrinology consulted, hx of poorly controlled DM2 with A1c >9  Home regimen of levemir 22mg BID and 16u novolog AC.    Started on sliding scale, novolog 10mg TIDWM and levemir 15mg BID  consult endo for management, appreciate assistance    Acute on chronic combined systolic and diastolic heart failure  NICM s/p DT HM3 on 6/23/2022  On home milrinone at 0.25mcg/kg/min for RV failure, continue  Home Regimen: valsartan 40mg BID, aldactone 25mg BID  Diuretics at home: lasix 40mg BID  Patient appears euvolemic today and compensated, will resume GDMT and lasix at home dose  Maintain electrolytes, strict I/Os, daily weights      Mukul Wilkinson MD  Heart Transplant  [facility] - Cardiology Stepdown

## 2023-06-03 NOTE — HPI
Patient is a 36 yo black male with stage d HFrEF, NICM, ? Familial CM (Father had LVAD and subsequent heart transplant), h/o PSA, DM2 on insulin who is s/p DT-HM3 implantation 6/23/2022, post op course complicated by early RV failure requiring RVAD with ProTek Duo after admitted with ADHF/cardiogenic shock on home milrinone requiring IABP. He underwent RVAD removal and chest closure 6/30/2022.  He also completed a course of IV Abx for staph epi. He was weaned off  but he had to restarted due to RVF. He was eventually transitioned to milrinone (secondary to  shortage) now on 0.25 mcg/kg/min.    He presented to an outside ED with complaints of fever (tmax 101 F), myalgias, and headache since Tuesday,  He also reported drainage from his DLES over the past 2 days.  He has a cough productive of a small amount of clear sputum.  No N/V or nasal congestion.  No VAD alarms.  Spouse also had fever and flu like symptoms over the past week. Otherwise states he has no other issues and thought that he had the flu.      Upon presentation to the ER, vitals stable, satting well on room air.  Labs show a sCr of 1.65 (baseline Cr 1.3), Leukocytosis to 13K, initial lactate normal at 1.9, COVID-19 PCR positive, Influenza PCR negative for A & B, CXR unremarkable.  CT abdomen was obtained to evaluate DLES drainage, shows mild stranding along distal drive line but otherwise unremarkable, no fluid collection.  He was started on vancomycin at the outside ED and was transferred to [facility] for COVID-19 infection in addition to possible LVAD DLES infection.      Home Medications:   Valsartan 40mg BID  Aldactone 25mg daily  Lasix 80mg PO daily (takes 40 BID due to cramping)  Milrinone 0.25 mcg/kg/min

## 2023-06-03 NOTE — ASSESSMENT & PLAN NOTE
+sick contact and pt was found to have COVID positive PCR. I independently reviewed his CXR - no focal consolidation. Not hypoxic.     Recommendations:  -continue rem  per hospital protocol given high risk  -covid precautions per infection control  -monitor for hepatotoxicity with daily CMP while on remdesivir

## 2023-06-03 NOTE — ASSESSMENT & PLAN NOTE
BG goal: 140-180  T2DM.  LVAD.  COVID on steroids.  DLES infxn concerns.  BG stable on admit.  Currently on reduction of home dose.  Will continue to monitor on regimen.  May require more prandial insulin with steroids.    - Continue Levemir 15 units BID,  - Continue Novolog 10 units AC  - Adjust Novolog correction dose with ISF 25 starting at 150  given steroids  - POCT Glucose before meals and at bedtime  - Hypoglycemia protocol in place      ** Please notify Endocrine for any change and/or advance in diet**  ** Please call Endocrine for any BG related issues **     Discharge Planning:   TBD. Please notify endocrinology prior to discharge.

## 2023-06-03 NOTE — HPI
Reason for Consult: Management of T2DM, Hyperglycemia      Surgical Procedure and Date: LVAD 06/29/2022     Diabetes diagnosis year: 2022     Home Diabetes Medications:   -Levemir 22 units BID.   -Novolog 16 units TIDWM in addition to the following correction scale:     150 - 200 + 1 unit     201 - 250 + 2 units     251 - 300 + 3 units     301 - 350 + 4 units      > 350   + 5 units     How often checking glucose at home?  Uses Freestyle William    BG readings on regimen: 92-180s  Hypoglycemia on the regimen?  No  Missed doses on regimen?  occasionally skips mealsn and will skip Novolog with breakfast      Diabetes Complications include:     Hyperglycemia     Complicating diabetes co morbidities:   History of MI, CHF, and CKD        HPI:   Pt is a 37 yo M w/ stage d HFrEF, NICM, ? Familial CM (Father had LVAD and subsequent heart transplant), h/o PSA, DM2 on insulin who is s/p DT-HM3 implantation 6/23/2022, post op course complicated by early RV failure requiring RVAD with ProTek Duo after admitted with ADHF/cardiogenic shock on home milrinone requiring IABP. He underwent RVAD removal and chest closure 6/30/2022. Presents w/ F and DLES drainage.  COVID positive.  Endocrine consulted for BG management.

## 2023-06-04 LAB
ANION GAP SERPL CALC-SCNC: 10 MMOL/L (ref 8–16)
ANION GAP SERPL CALC-SCNC: 10 MMOL/L (ref 8–16)
APTT PPP: 39.6 SEC (ref 21–32)
BASOPHILS # BLD AUTO: 0.03 K/UL (ref 0–0.2)
BASOPHILS NFR BLD: 0.2 % (ref 0–1.9)
BUN SERPL-MCNC: 11 MG/DL (ref 6–20)
BUN SERPL-MCNC: 13 MG/DL (ref 6–20)
CALCIUM SERPL-MCNC: 9.4 MG/DL (ref 8.7–10.5)
CALCIUM SERPL-MCNC: 9.5 MG/DL (ref 8.7–10.5)
CHLORIDE SERPL-SCNC: 102 MMOL/L (ref 95–110)
CHLORIDE SERPL-SCNC: 102 MMOL/L (ref 95–110)
CO2 SERPL-SCNC: 21 MMOL/L (ref 23–29)
CO2 SERPL-SCNC: 25 MMOL/L (ref 23–29)
CREAT SERPL-MCNC: 1.3 MG/DL (ref 0.5–1.4)
CREAT SERPL-MCNC: 1.4 MG/DL (ref 0.5–1.4)
DIFFERENTIAL METHOD: ABNORMAL
EOSINOPHIL # BLD AUTO: 0 K/UL (ref 0–0.5)
EOSINOPHIL NFR BLD: 0 % (ref 0–8)
ERYTHROCYTE [DISTWIDTH] IN BLOOD BY AUTOMATED COUNT: 21.8 % (ref 11.5–14.5)
EST. GFR  (NO RACE VARIABLE): >60 ML/MIN/1.73 M^2
EST. GFR  (NO RACE VARIABLE): >60 ML/MIN/1.73 M^2
GLUCOSE SERPL-MCNC: 200 MG/DL (ref 70–110)
GLUCOSE SERPL-MCNC: 247 MG/DL (ref 70–110)
HCT VFR BLD AUTO: 40.9 % (ref 40–54)
HGB BLD-MCNC: 13.2 G/DL (ref 14–18)
IMM GRANULOCYTES # BLD AUTO: 0.05 K/UL (ref 0–0.04)
IMM GRANULOCYTES NFR BLD AUTO: 0.4 % (ref 0–0.5)
INR PPP: 2.9 (ref 0.8–1.2)
LDH SERPL L TO P-CCNC: 350 U/L (ref 110–260)
LYMPHOCYTES # BLD AUTO: 1 K/UL (ref 1–4.8)
LYMPHOCYTES NFR BLD: 7.1 % (ref 18–48)
MAGNESIUM SERPL-MCNC: 2.1 MG/DL (ref 1.6–2.6)
MCH RBC QN AUTO: 24.6 PG (ref 27–31)
MCHC RBC AUTO-ENTMCNC: 32.3 G/DL (ref 32–36)
MCV RBC AUTO: 76 FL (ref 82–98)
MONOCYTES # BLD AUTO: 0.6 K/UL (ref 0.3–1)
MONOCYTES NFR BLD: 4.1 % (ref 4–15)
NEUTROPHILS # BLD AUTO: 12.5 K/UL (ref 1.8–7.7)
NEUTROPHILS NFR BLD: 88.2 % (ref 38–73)
NRBC BLD-RTO: 0 /100 WBC
PHOSPHATE SERPL-MCNC: 2 MG/DL (ref 2.7–4.5)
PLATELET # BLD AUTO: 250 K/UL (ref 150–450)
PMV BLD AUTO: 9.3 FL (ref 9.2–12.9)
POCT GLUCOSE: 161 MG/DL (ref 70–110)
POCT GLUCOSE: 169 MG/DL (ref 70–110)
POCT GLUCOSE: 179 MG/DL (ref 70–110)
POCT GLUCOSE: 195 MG/DL (ref 70–110)
POTASSIUM SERPL-SCNC: 4.3 MMOL/L (ref 3.5–5.1)
POTASSIUM SERPL-SCNC: 4.6 MMOL/L (ref 3.5–5.1)
PROTHROMBIN TIME: 28.8 SEC (ref 9–12.5)
RBC # BLD AUTO: 5.37 M/UL (ref 4.6–6.2)
SODIUM SERPL-SCNC: 133 MMOL/L (ref 136–145)
SODIUM SERPL-SCNC: 137 MMOL/L (ref 136–145)
VANCOMYCIN TROUGH SERPL-MCNC: 11.8 UG/ML (ref 10–22)
WBC # BLD AUTO: 14.22 K/UL (ref 3.9–12.7)

## 2023-06-04 PROCEDURE — 80048 BASIC METABOLIC PNL TOTAL CA: CPT | Mod: 91 | Performed by: STUDENT IN AN ORGANIZED HEALTH CARE EDUCATION/TRAINING PROGRAM

## 2023-06-04 PROCEDURE — 36415 COLL VENOUS BLD VENIPUNCTURE: CPT | Performed by: INTERNAL MEDICINE

## 2023-06-04 PROCEDURE — 83735 ASSAY OF MAGNESIUM: CPT | Performed by: STUDENT IN AN ORGANIZED HEALTH CARE EDUCATION/TRAINING PROGRAM

## 2023-06-04 PROCEDURE — 25000242 PHARM REV CODE 250 ALT 637 W/ HCPCS: Performed by: STUDENT IN AN ORGANIZED HEALTH CARE EDUCATION/TRAINING PROGRAM

## 2023-06-04 PROCEDURE — 25000003 PHARM REV CODE 250: Performed by: STUDENT IN AN ORGANIZED HEALTH CARE EDUCATION/TRAINING PROGRAM

## 2023-06-04 PROCEDURE — 63600175 PHARM REV CODE 636 W HCPCS: Mod: JZ,TB | Performed by: STUDENT IN AN ORGANIZED HEALTH CARE EDUCATION/TRAINING PROGRAM

## 2023-06-04 PROCEDURE — 84100 ASSAY OF PHOSPHORUS: CPT | Performed by: STUDENT IN AN ORGANIZED HEALTH CARE EDUCATION/TRAINING PROGRAM

## 2023-06-04 PROCEDURE — 85610 PROTHROMBIN TIME: CPT | Performed by: STUDENT IN AN ORGANIZED HEALTH CARE EDUCATION/TRAINING PROGRAM

## 2023-06-04 PROCEDURE — 93750 INTERROGATION VAD IN PERSON: CPT | Mod: ,,, | Performed by: INTERNAL MEDICINE

## 2023-06-04 PROCEDURE — 99233 PR SUBSEQUENT HOSPITAL CARE,LEVL III: ICD-10-PCS | Mod: 25,,, | Performed by: INTERNAL MEDICINE

## 2023-06-04 PROCEDURE — 85025 COMPLETE CBC W/AUTO DIFF WBC: CPT | Performed by: STUDENT IN AN ORGANIZED HEALTH CARE EDUCATION/TRAINING PROGRAM

## 2023-06-04 PROCEDURE — 36415 COLL VENOUS BLD VENIPUNCTURE: CPT | Performed by: STUDENT IN AN ORGANIZED HEALTH CARE EDUCATION/TRAINING PROGRAM

## 2023-06-04 PROCEDURE — 20600001 HC STEP DOWN PRIVATE ROOM

## 2023-06-04 PROCEDURE — 27000248 HC VAD-ADDITIONAL DAY

## 2023-06-04 PROCEDURE — 93750 PR INTERROGATE VENT ASSIST DEV, IN PERSON, W PHYSICIAN ANALYSIS: ICD-10-PCS | Mod: ,,, | Performed by: INTERNAL MEDICINE

## 2023-06-04 PROCEDURE — 80048 BASIC METABOLIC PNL TOTAL CA: CPT | Performed by: STUDENT IN AN ORGANIZED HEALTH CARE EDUCATION/TRAINING PROGRAM

## 2023-06-04 PROCEDURE — 63600175 PHARM REV CODE 636 W HCPCS: Performed by: STUDENT IN AN ORGANIZED HEALTH CARE EDUCATION/TRAINING PROGRAM

## 2023-06-04 PROCEDURE — 83615 LACTATE (LD) (LDH) ENZYME: CPT | Performed by: STUDENT IN AN ORGANIZED HEALTH CARE EDUCATION/TRAINING PROGRAM

## 2023-06-04 PROCEDURE — 94761 N-INVAS EAR/PLS OXIMETRY MLT: CPT

## 2023-06-04 PROCEDURE — 85730 THROMBOPLASTIN TIME PARTIAL: CPT | Performed by: STUDENT IN AN ORGANIZED HEALTH CARE EDUCATION/TRAINING PROGRAM

## 2023-06-04 PROCEDURE — 94640 AIRWAY INHALATION TREATMENT: CPT

## 2023-06-04 PROCEDURE — 80202 ASSAY OF VANCOMYCIN: CPT | Performed by: INTERNAL MEDICINE

## 2023-06-04 PROCEDURE — 63600175 PHARM REV CODE 636 W HCPCS: Performed by: INTERNAL MEDICINE

## 2023-06-04 PROCEDURE — 99233 SBSQ HOSP IP/OBS HIGH 50: CPT | Mod: 25,,, | Performed by: INTERNAL MEDICINE

## 2023-06-04 PROCEDURE — 25000003 PHARM REV CODE 250: Performed by: INTERNAL MEDICINE

## 2023-06-04 RX ORDER — INSULIN ASPART 100 [IU]/ML
14 INJECTION, SOLUTION INTRAVENOUS; SUBCUTANEOUS
Status: DISCONTINUED | OUTPATIENT
Start: 2023-06-04 | End: 2023-06-04

## 2023-06-04 RX ORDER — WARFARIN 2 MG/1
2 TABLET ORAL DAILY
Status: DISCONTINUED | OUTPATIENT
Start: 2023-06-04 | End: 2023-06-05

## 2023-06-04 RX ORDER — SODIUM,POTASSIUM PHOSPHATES 280-250MG
2 POWDER IN PACKET (EA) ORAL ONCE
Status: COMPLETED | OUTPATIENT
Start: 2023-06-04 | End: 2023-06-04

## 2023-06-04 RX ORDER — INSULIN ASPART 100 [IU]/ML
12 INJECTION, SOLUTION INTRAVENOUS; SUBCUTANEOUS
Status: DISCONTINUED | OUTPATIENT
Start: 2023-06-04 | End: 2023-06-07 | Stop reason: HOSPADM

## 2023-06-04 RX ADMIN — FUROSEMIDE 40 MG: 40 TABLET ORAL at 08:06

## 2023-06-04 RX ADMIN — BUSPIRONE HYDROCHLORIDE 7.5 MG: 5 TABLET ORAL at 08:06

## 2023-06-04 RX ADMIN — INSULIN ASPART 2 UNITS: 100 INJECTION, SOLUTION INTRAVENOUS; SUBCUTANEOUS at 08:06

## 2023-06-04 RX ADMIN — INSULIN ASPART 2 UNITS: 100 INJECTION, SOLUTION INTRAVENOUS; SUBCUTANEOUS at 12:06

## 2023-06-04 RX ADMIN — ALBUTEROL SULFATE 2 PUFF: 108 AEROSOL, METERED RESPIRATORY (INHALATION) at 12:06

## 2023-06-04 RX ADMIN — VANCOMYCIN HYDROCHLORIDE 1250 MG: 1.25 INJECTION, POWDER, LYOPHILIZED, FOR SOLUTION INTRAVENOUS at 12:06

## 2023-06-04 RX ADMIN — ALBUTEROL SULFATE 2 PUFF: 108 AEROSOL, METERED RESPIRATORY (INHALATION) at 08:06

## 2023-06-04 RX ADMIN — ALBUTEROL SULFATE 2 PUFF: 108 AEROSOL, METERED RESPIRATORY (INHALATION) at 07:06

## 2023-06-04 RX ADMIN — PIPERACILLIN AND TAZOBACTAM 4.5 G: 4; .5 INJECTION, POWDER, LYOPHILIZED, FOR SOLUTION INTRAVENOUS; PARENTERAL at 12:06

## 2023-06-04 RX ADMIN — MORPHINE SULFATE 2 MG: 2 INJECTION, SOLUTION INTRAMUSCULAR; INTRAVENOUS at 06:06

## 2023-06-04 RX ADMIN — LEVETIRACETAM 1000 MG: 500 TABLET, FILM COATED ORAL at 08:06

## 2023-06-04 RX ADMIN — SODIUM PHOSPHATE, MONOBASIC, MONOHYDRATE AND SODIUM PHOSPHATE, DIBASIC, ANHYDROUS 30 MMOL: 142; 276 INJECTION, SOLUTION INTRAVENOUS at 02:06

## 2023-06-04 RX ADMIN — REMDESIVIR 100 MG: 100 INJECTION, POWDER, LYOPHILIZED, FOR SOLUTION INTRAVENOUS at 08:06

## 2023-06-04 RX ADMIN — THERA TABS 1 TABLET: TAB at 08:06

## 2023-06-04 RX ADMIN — OXYCODONE HYDROCHLORIDE AND ACETAMINOPHEN 500 MG: 500 TABLET ORAL at 08:06

## 2023-06-04 RX ADMIN — MILRINONE LACTATE IN DEXTROSE 0.25 MCG/KG/MIN: 200 INJECTION, SOLUTION INTRAVENOUS at 05:06

## 2023-06-04 RX ADMIN — INSULIN ASPART 12 UNITS: 100 INJECTION, SOLUTION INTRAVENOUS; SUBCUTANEOUS at 08:06

## 2023-06-04 RX ADMIN — VALSARTAN 40 MG: 40 TABLET, FILM COATED ORAL at 09:06

## 2023-06-04 RX ADMIN — MIRTAZAPINE 15 MG: 15 TABLET, FILM COATED ORAL at 09:06

## 2023-06-04 RX ADMIN — PIPERACILLIN AND TAZOBACTAM 4.5 G: 4; .5 INJECTION, POWDER, LYOPHILIZED, FOR SOLUTION INTRAVENOUS; PARENTERAL at 04:06

## 2023-06-04 RX ADMIN — INSULIN ASPART 1 UNITS: 100 INJECTION, SOLUTION INTRAVENOUS; SUBCUTANEOUS at 09:06

## 2023-06-04 RX ADMIN — POTASSIUM & SODIUM PHOSPHATES POWDER PACK 280-160-250 MG 2 PACKET: 280-160-250 PACK at 09:06

## 2023-06-04 RX ADMIN — FAMOTIDINE 40 MG: 20 TABLET, FILM COATED ORAL at 08:06

## 2023-06-04 RX ADMIN — MILRINONE LACTATE IN DEXTROSE 0.25 MCG/KG/MIN: 200 INJECTION, SOLUTION INTRAVENOUS at 12:06

## 2023-06-04 RX ADMIN — FUROSEMIDE 40 MG: 40 TABLET ORAL at 05:06

## 2023-06-04 RX ADMIN — INSULIN ASPART 2 UNITS: 100 INJECTION, SOLUTION INTRAVENOUS; SUBCUTANEOUS at 04:06

## 2023-06-04 RX ADMIN — INSULIN ASPART 12 UNITS: 100 INJECTION, SOLUTION INTRAVENOUS; SUBCUTANEOUS at 12:06

## 2023-06-04 RX ADMIN — VALSARTAN 40 MG: 40 TABLET, FILM COATED ORAL at 08:06

## 2023-06-04 RX ADMIN — PIPERACILLIN AND TAZOBACTAM 4.5 G: 4; .5 INJECTION, POWDER, LYOPHILIZED, FOR SOLUTION INTRAVENOUS; PARENTERAL at 09:06

## 2023-06-04 RX ADMIN — INSULIN ASPART 12 UNITS: 100 INJECTION, SOLUTION INTRAVENOUS; SUBCUTANEOUS at 04:06

## 2023-06-04 RX ADMIN — SPIRONOLACTONE 25 MG: 25 TABLET, FILM COATED ORAL at 08:06

## 2023-06-04 RX ADMIN — OXYCODONE HYDROCHLORIDE AND ACETAMINOPHEN 500 MG: 500 TABLET ORAL at 09:06

## 2023-06-04 RX ADMIN — WARFARIN SODIUM 2 MG: 2 TABLET ORAL at 04:06

## 2023-06-04 NOTE — PLAN OF CARE
AAOX4,VSS,O2 sats>97% on RA.Patient ambulating independently, fall precautions in place.LVAD DP and numbers WNL, smooth LVAD hum. Patient has no complaints of pain/SOB. Pt with milrinone gtt 0.25mcg/kg/min. Discussed medications and care.Patient has no questions at this time.Pt visualised and stable. Pt resting well,call light within reach, bed at the lowest position.    Problem: Adult Inpatient Plan of Care  Goal: Plan of Care Review  Outcome: Ongoing, Progressing  Goal: Absence of Hospital-Acquired Illness or Injury  Outcome: Ongoing, Progressing  Goal: Optimal Comfort and Wellbeing  Outcome: Ongoing, Progressing     Problem: Diabetes Comorbidity  Goal: Blood Glucose Level Within Targeted Range  Outcome: Ongoing, Progressing     Problem: Infection  Goal: Absence of Infection Signs and Symptoms  Outcome: Ongoing, Progressing     Problem: Fall Injury Risk  Goal: Absence of Fall and Fall-Related Injury  Outcome: Ongoing, Progressing

## 2023-06-04 NOTE — PROGRESS NOTES
06/04/2023  John Alaniz    Current provider:  Angela Yang DO    Device interrogation:  TXP LVAD INTERROGATIONS 6/4/2023 6/4/2023 6/4/2023 6/3/2023 6/3/2023 6/3/2023 6/3/2023   Type HeartMate3 HeartMate3 HeartMate3 HeartMate3 HeartMate3 HeartMate3 HeartMate3   Flow 4.5 4.5 4.5 4.4 4.4 4.1 4.0   Speed 5100 5150 5100 5100 5100 5100 5100   PI 3.7 3.6 3.3 3.0 3.5 4.7 5.1   Power (Serna) 3.7 3.8 3.7 3.7 3.7 3.7 3.7   LSL 4700 4750 4700 4700 4700 4700 4700   Low Flow Alarm - - - - - - -   High Power Alarm - - - - - - -   Pulsatility Intermittent pulse Intermittent pulse Intermittent pulse Intermittent pulse Intermittent pulse Intermittent pulse -          Rounded on Kevan Queen to ensure all mechanical assist device settings (IABP or VAD) were appropriate and all parameters were within limits.  I was able to ensure all back up equipment was present, the staff had no issues, and the Perfusion Department daily rounding was complete.      For implantable VADs: Interrogation of Ventricular assist device was performed with analysis of device parameters and review of device function. I have personally reviewed the interrogation findings and agree with findings as stated.     In emergency, the nursing units have been notified to contact the perfusion department either by:  Calling [phone] from 630am to 4pm Mon thru Fri, utilizing the On-Call Finder functionality of Epic and searching for Perfusion, or by contacting the hospital  from 4pm to 630am and on weekends and asking to speak with the perfusionist on call.    9:44 AM

## 2023-06-04 NOTE — PROGRESS NOTES
[facility] - Cardiology Stepdown  Heart Transplant  Progress Note    Patient Name: Kevan Queen  MRN: [id number]  Admission Date: 6/2/2023  Hospital Length of Stay: 2 days  Attending Physician: Angela Yang DO  Primary Care Provider: ORALIA Cline  Principal Problem:COVID-19    Subjective:     Interval History: NAEO. Patient feels fine and has no new complaints. No fever since yesterday morning 100.1F. Cultures still negative. Started remdesivir yesterday. Still on vanc+zosyn. DLES drainage decreased.    Continuous Infusions:   milrinone 20mg/100ml D5W (200mcg/ml) 0.25 mcg/kg/min (06/04/23 0039)     Scheduled Meds:   albuterol  2 puff Inhalation Q6H    ascorbic acid (vitamin C)  500 mg Oral BID    busPIRone  7.5 mg Oral Daily    famotidine  40 mg Oral Daily    furosemide  40 mg Oral BID    gabapentin  100 mg Oral TID    insulin aspart U-100  12 Units Subcutaneous TIDWM    insulin detemir U-100 (Levemir)  17 Units Subcutaneous BID    INV ASPIRIN 100MG/PLACEBO  100 mg Oral Daily    levETIRAcetam  1,000 mg Oral BID    mirtazapine  15 mg Oral Nightly    multivitamin  1 tablet Oral Daily    piperacillin-tazobactam (ZOSYN) IVPB  4.5 g Intravenous Q8H    remdesivir infusion  100 mg Intravenous Daily    sodium phosphate IVPB  30 mmol Intravenous Once    spironolactone  25 mg Oral Daily    valsartan  40 mg Oral BID    vancomycin (VANCOCIN) IVPB  1,250 mg Intravenous Q12H    warfarin  2 mg Oral Daily     PRN Meds:acetaminophen, cyclobenzaprine, dextrose 10%, dextrose 10%, dextrose 10%, dextrose 10%, dextrose, dextrose, glucagon (human recombinant), insulin aspart U-100, morphine, sodium chloride 0.9%, Pharmacy to dose Vancomycin consult **AND** vancomycin - pharmacy to dose    Review of patient's allergies indicates:   Allergen Reactions    Aspirin Other (See Comments)     Mr. Thacker is enrolled in Dr. Paige's Alon Trial and cannot have any aspirin and/or aspirin-containing products. DO NOT  cancel any orders for INV Aspirin 100 mg/Placebo. If you have questions, please contact Isabel @ [phone], [phone],[email or web address], secure chat or MS Teams message.    Bumex [bumetanide] Hives    Lactose Diarrhea     Other reaction(s): Abdominal distension, gaseous    Torsemide Hives     Objective:     Vital Signs (Most Recent):  Temp: 96.7 °F (35.9 °C) (06/04/23 1226)  Pulse: 90 (06/04/23 1226)  Resp: 16 (06/04/23 1226)  BP: (!) 82/0 (06/04/23 1226)  SpO2: 97 % (06/04/23 1226) Vital Signs (24h Range):  Temp:  [96.7 °F (35.9 °C)-98.1 °F (36.7 °C)] 96.7 °F (35.9 °C)  Pulse:  [] 90  Resp:  [16-18] 16  SpO2:  [95 %-99 %] 97 %  BP: ()/(0-60) 82/0     Patient Vitals for the past 72 hrs (Last 3 readings):   Weight   06/02/23 2327 99.4 kg (219 lb 2.2 oz)     Body mass index is 27.39 kg/m².      Intake/Output Summary (Last 24 hours) at 6/4/2023 1257  Last data filed at 6/4/2023 1244  Gross per 24 hour   Intake 240 ml   Output 3000 ml   Net -2760 ml              Physical Exam  Constitutional:       Appearance: Normal appearance.   HENT:      Head: Normocephalic and atraumatic.      Mouth/Throat:      Mouth: Mucous membranes are moist.   Neck:      Comments: No elevation in JVP appreciated  Cardiovascular:      Rate and Rhythm: Normal rate.      Comments: Normal VAD hum can be heard  Pulmonary:      Effort: Pulmonary effort is normal. No respiratory distress.      Breath sounds: Normal breath sounds. No wheezing or rales.   Abdominal:      General: Abdomen is flat.      Tenderness: There is abdominal tenderness. There is guarding.      Comments: DLES with drainage around dressing on R anterior lower abdominal wall   Musculoskeletal:         General: No swelling.      Cervical back: Normal range of motion and neck supple.   Skin:     General: Skin is warm.      Capillary Refill: Capillary refill takes 2 to 3 seconds.      Comments: R sided PICC line in basilic vein   Neurological:      General: No  focal deficit present.      Mental Status: He is alert and oriented to person, place, and time.   Psychiatric:         Mood and Affect: Mood normal.         Behavior: Behavior normal.          Significant Labs:  CBC:  Recent Labs   Lab 06/02/23 1651 06/03/23  0532 06/04/23  0513   WBC 13.06* 12.69 14.22*   RBC 5.29 4.92 5.37   HGB 13.3* 12.1* 13.2*   HCT 41.2* 39.4* 40.9    223 250   MCV 77.9* 80* 76*   MCH 25.1* 24.6* 24.6*   MCHC 32.3* 30.7* 32.3     BNP:  No results for input(s): BNP in the last 168 hours.    Invalid input(s): BNPTRIAGELBLO  CMP:  Recent Labs   Lab 06/02/23 1652 06/03/23  0532 06/04/23  0513   GLU  --  105 247*   CALCIUM 8.9 8.7 9.5   ALBUMIN 4.0  --   --    * 135* 133*   K 4.1 3.9 4.6   CO2 21* 21* 21*   CL  --  103 102   BUN 7.2* 8 11   CREATININE 1.65* 1.4 1.4   ALKPHOS 101  --   --    ALT 10  --   --    AST 22  --   --    BILITOT 1.0  --   --       Coagulation:   Recent Labs   Lab 06/03/23 0040 06/03/23  0532 06/04/23  0513   INR  --  2.0* 2.9*   APTT 36.6* 37.5* 39.6*     LDH:  Recent Labs   Lab 06/03/23 0532 06/04/23  0513    350*     Microbiology:  Microbiology Results (last 7 days)       Procedure Component Value Units Date/Time    Culture, Anaerobe [[id number]] Collected: 06/03/23 0135    Order Status: Completed Specimen: Skin from Abdomen Updated: 06/04/23 1009     Anaerobic Culture Culture in progress    Narrative:      DLES    Aerobic culture [[id number]] Collected: 06/03/23 0135    Order Status: Completed Specimen: Skin from Abdomen Updated: 06/04/23 0718     Aerobic Bacterial Culture Further report to follow    Narrative:      LVAD DLES    Blood culture [[id number]] Collected: 06/03/23 0040    Order Status: Completed Specimen: Blood Updated: 06/04/23 0613     Blood Culture, Routine No Growth to date      No Growth to date    Blood culture [[id number]] Collected: 06/03/23 0040    Order Status: Completed Specimen: Blood Updated: 06/04/23 0613     Blood Culture,  Routine No Growth to date      No Growth to date            I have reviewed all pertinent labs within the past 24 hours.    Estimated Creatinine Clearance: 85.5 mL/min (based on SCr of 1.4 mg/dL).    Diagnostic Results:  I have reviewed and interpreted all pertinent imaging results/findings within the past 24 hours.    Assessment and Plan:     Patient is a 38 yo black male with stage d HFrEF, NICM, ? Familial CM (Father had LVAD and subsequent heart transplant), h/o PSA, DM2 on insulin who is s/p DT-HM3 implantation 6/23/2022, post op course complicated by early RV failure requiring RVAD with ProTek Duo after admitted with ADHF/cardiogenic shock on home milrinone requiring IABP. He underwent RVAD removal and chest closure 6/30/2022.  He also completed a course of IV Abx for staph epi. He was weaned off  but he had to restarted due to RVF. He was eventually transitioned to milrinone (secondary to  shortage) now on 0.25 mcg/kg/min.    He presented to an outside ED with complaints of fever (tmax 101 F), myalgias, and headache since Tuesday,  He also reported drainage from his DLES over the past 2 days.  He has a cough productive of a small amount of clear sputum.  No N/V or nasal congestion.  No VAD alarms.  Spouse also had fever and flu like symptoms over the past week. Otherwise states he has no other issues and thought that he had the flu.      Upon presentation to the ER, vitals stable, satting well on room air.  Labs show a sCr of 1.65 (baseline Cr 1.3), Leukocytosis to 13K, initial lactate normal at 1.9, COVID-19 PCR positive, Influenza PCR negative for A & B, CXR unremarkable.  CT abdomen was obtained to evaluate DLES drainage, shows mild stranding along distal drive line but otherwise unremarkable, no fluid collection.  He was started on vancomycin at the outside ED and [place] [facility] [place] in addition to possible LVAD DLES infection.      Home Medications:   Valsartan 40mg  BID  Aldactone 25mg daily  Lasix 80mg PO daily (takes 40 BID due to cramping)  Milrinone 0.25 mcg/kg/min      * COVID-19  Patient tested positive for COVID on 6/2/23  Date of symptom onset: 5/30/22  O2 requirement: room air  Abx: vancomycin and zosyn (started on 6/3/23)  Trending inflammatory markers  Continue remdesivir      Seizure-like activity  Avoiding cefepime given hx of seizure with possibility of lowering seizure threshold  Continue keppra 1,000mg BID    LVAD (left ventricular assist device) present  Procedure: Device Interrogation Including analysis of device parameters  Current Settings: Ventricular Assist Device  Review of device function is stable  Continue coumadin at home dose for INR goal of 2.0-3.0, monitor daily in the AM  Suspect DLES infection based on CT  Continue vanc+zosyn pending cultures    TXP LVAD INTERROGATIONS 6/4/2023 6/4/2023 6/4/2023 6/3/2023 6/3/2023 6/3/2023 6/3/2023   Type HeartMate3 HeartMate3 HeartMate3 HeartMate3 HeartMate3 HeartMate3 HeartMate3   Flow 4.5 4.5 4.5 4.4 4.4 4.1 4.0   Speed 5100 5150 5100 5100 5100 5100 5100   PI 3.7 3.6 3.3 3.0 3.5 4.7 5.1   Power (Serna) 3.7 3.8 3.7 3.7 3.7 3.7 3.7   LSL 4700 4750 4700 4700 4700 4700 4700   Low Flow Alarm - - - - - - -   High Power Alarm - - - - - - -   Pulsatility Intermittent pulse Intermittent pulse Intermittent pulse Intermittent pulse Intermittent pulse Intermittent pulse -       Type 2 diabetes mellitus with hyperglycemia  Endocrinology consulted, hx of poorly controlled DM2 with A1c >9  Home regimen of levemir 22mg BID and 16u novolog AC.    Started on sliding scale, novolog 10mg TIDWM and levemir 15mg BID  consult endo for management, appreciate assistance    Acute on chronic combined systolic and diastolic heart failure  NICM s/p DT HM3 on 6/23/2022  On home milrinone at 0.25mcg/kg/min for RV failure, continue  Home Regimen: valsartan 40mg BID, aldactone 25mg BID  Diuretics at home: lasix 40mg BID  Euvolemic  Continue  home meds  Maintain electrolytes, strict I/Os, daily weights        Tigist Beebe MD  Heart Transplant  [facility] - Cardiology Stepdown

## 2023-06-04 NOTE — PLAN OF CARE
Chart reviewed - No fevers documented overnight.   DLES cx in process. Blcx ngtd.     Recommendations:  -continue vancomycin and zosyn pending cx data  -continue remdesivir per hosptial protocol  -trend WBC

## 2023-06-04 NOTE — CARE UPDATE
-Glucose Goal 140-180    -A1C:   Hemoglobin A1C   Date Value Ref Range Status   04/02/2023 7.9 (H) 4.0 - 5.6 % Final     Comment:     ADA Screening Guidelines:  5.7-6.4%  Consistent with prediabetes  >or=6.5%  Consistent with diabetes    High levels of fetal hemoglobin interfere with the HbA1C  assay. Heterozygous hemoglobin variants (HbS, HgC, etc)do  not significantly interfere with this assay.   However, presence of multiple variants may affect accuracy.           -HOME REGIMEN: -Levemir 22 units BID.   -Novolog 16 units TIDWM in addition to the following correction scale:     150 - 200 + 1 unit     201 - 250 + 2 units     251 - 300 + 3 units     301 - 350 + 4 units      > 350   + 5 units    -GLUCOSE TREND FOR THE PAST 24HRS:   Recent Labs   Lab 06/02/23  2339 06/03/23  0853 06/03/23  1156 06/03/23  1703 06/03/23 2006   POCTGLUCOSE 144* 131* 136* 258* 303*         -NO HYPOGYCEMIAS NOTED     - Diet  Diet Vegetarian Lacto Ovo (and additional linked orders)    T2DM.  LVAD.  COVID.  Received dex yesterday. DLES infxn concerns.  BG elevated yesterday likely secondary to steroids.  Will slightly increase regimen. .     - Increase Levemir 17 units BID,  - Increase Novolog 12 units AC  - Adjust Novolog correction dose with ISF 25 starting at 150  given steroids  - POCT Glucose before meals and at bedtime  - Hypoglycemia protocol in place      ** Please notify Endocrine for any change and/or advance in diet**  ** Please call Endocrine for any BG related issues **     Discharge Planning:   TBD. Please notify endocrinology prior to discharge.

## 2023-06-04 NOTE — NURSING
"LVAD dressing change completed using sterile technique with kit. DLES is a "1" with small amount of purulent drainage noted on the drain sponge. Tolerated without any complication. No redness, or tenderness noted.    "

## 2023-06-04 NOTE — NURSING
Spoke with pharmacy regarding patient's INV aspirin 100mg/placebo. Patient left his bottle at home. Research team is unavailable over the weekends. Pharmacy/nursing team will reach out to obtain medication while patient is in the hospital tomorrow when research team is available.

## 2023-06-04 NOTE — PROGRESS NOTES
Pharmacokinetic Assessment Follow Up: IV Vancomycin    Vancomycin serum concentration assessment(s):  Vancomycin trough resulted at 11.8, which may be used to guide therapy. Goal trough 15-20. Cultures NGTD.   Renal function remains stable. SCr today 1.4 mg/dL.   Dose increased to vancomycin 1250mg Q 12 hours.   Trough ordered for 6.6 at 0030.    Yany Perkins, PharmD, BCPS  Heart Transplant Clinical Specialist   Spectralink: [id number]    Drug levels (last 3 results):  Recent Labs   Lab Result Units 06/04/23  0846   Vancomycin-Trough ug/mL 11.8        Patient brief summary:  Kevan Queen is a 38 y.o. male initiated on antimicrobial therapy with IV Vancomycin for treatment of bacteremia    Drug Allergies:   Review of patient's allergies indicates:   Allergen Reactions    Aspirin Other (See Comments)     Mr. Thacker is enrolled in Dr. Paige's Alon Trial and cannot have any aspirin and/or aspirin-containing products. DO NOT cancel any orders for INV Aspirin 100 mg/Placebo. If you have questions, please contact Isabel @ [phone], [phone],[email or web address], secure chat or MS Teams message.    Bumex [bumetanide] Hives    Lactose Diarrhea     Other reaction(s): Abdominal distension, gaseous    Torsemide Hives       Actual Body Weight:   99.4kg    Renal Function:   Estimated Creatinine Clearance: 85.5 mL/min (based on SCr of 1.4 mg/dL).,     Dialysis Method (if applicable):  N/A     CBC (last 72 hours):  Recent Labs   Lab Result Units 06/02/23  1651 06/03/23  0532 06/04/23  0513   WBC K/uL 13.06* 12.69 14.22*   Hemoglobin g/dL  --  12.1* 13.2*   Hgb g/dL 13.3*  --   --    Hematocrit %  --  39.4* 40.9   Hct % 41.2*  --   --    Platelets K/uL  --  223 250   Platelet x10(3)/mcL 238  --   --    Gran % %  --  74.6* 88.2*   Lymph % %  --  15.1* 7.1*   Mono % % 8.9 9.7 4.1   Eosinophil % %  --  0.1 0.0   Eos % % 0.0  --   --    Basophil % % 0.3 0.3 0.2   Differential Method   --  Automated Automated       Metabolic Panel  (last 72 hours):  Recent Labs   Lab Result Units 06/02/23  1652 06/02/23  1819 06/03/23  0206 06/03/23  0532 06/04/23  0513   Sodium mmol/L  --   --   --  135* 133*   Sodium Level mmol/L 132*  --   --   --   --    Potassium mmol/L  --   --   --  3.9 4.6   Potassium Level mmol/L 4.1  --   --   --   --    Chloride mmol/L 101  --   --  103 102   CO2 mmol/L  --   --   --  21* 21*   Carbon Dioxide mmol/L 21*  --   --   --   --    Glucose mg/dL  --   --   --  105 247*   Glucose Level mg/dL 186*  --   --   --   --    Glucose, UA mg/dL  --  Negative  --   --   --    BUN mg/dL  --   --   --  8 11   Blood Urea Nitrogen mg/dL 7.2*  --   --   --   --    Creatinine mg/dL 1.65*  --   --  1.4 1.4   Creatinine, Urine mg/dL  --   --  79.0  --   --    Albumin Level g/dL 4.0  --   --   --   --    Bilirubin Total mg/dL 1.0  --   --   --   --    Alkaline Phosphatase unit/L 101  --   --   --   --    Aspartate Aminotransferase unit/L 22  --   --   --   --    Alanine Aminotransferase unit/L 10  --   --   --   --    Magnesium mg/dL  --   --   --  1.8 2.1   Phosphorus mg/dL  --   --   --  2.9 2.0*       Vancomycin Administrations:  vancomycin given in the last 96 hours                     vancomycin (VANCOCIN) 1,000 mg in dextrose 5 % (D5W) 250 mL IVPB (Vial-Mate) (mg) 1,000 mg New Bag 06/03/23 2219     1,000 mg New Bag  0620    vancomycin 2 g in dextrose 5 % 500 mL IVPB (mg) 2,000 mg New Bag 06/02/23 9022                    Microbiologic Results:  Microbiology Results (last 7 days)       Procedure Component Value Units Date/Time    Culture, Anaerobe [[id number]] Collected: 06/03/23 0135    Order Status: Completed Specimen: Skin from Abdomen Updated: 06/04/23 1009     Anaerobic Culture Culture in progress    Narrative:      DLES    Aerobic culture [[id number]] Collected: 06/03/23 0135    Order Status: Completed Specimen: Skin from Abdomen Updated: 06/04/23 0718     Aerobic Bacterial Culture Further report to follow    Narrative:      LVAD DLES     Blood culture [[id number]] Collected: 06/03/23 0040    Order Status: Completed Specimen: Blood Updated: 06/04/23 0613     Blood Culture, Routine No Growth to date      No Growth to date    Blood culture [[id number]] Collected: 06/03/23 0040    Order Status: Completed Specimen: Blood Updated: 06/04/23 0613     Blood Culture, Routine No Growth to date      No Growth to date

## 2023-06-04 NOTE — SUBJECTIVE & OBJECTIVE
Interval History: NAEO. Patient feels fine and has no new complaints. No fever since yesterday morning 100.1F. Cultures still negative. Started remdesivir yesterday. Still on vanc+zosyn. DLES drainage decreased.    Continuous Infusions:   milrinone 20mg/100ml D5W (200mcg/ml) 0.25 mcg/kg/min (06/04/23 0039)     Scheduled Meds:   albuterol  2 puff Inhalation Q6H    ascorbic acid (vitamin C)  500 mg Oral BID    busPIRone  7.5 mg Oral Daily    famotidine  40 mg Oral Daily    furosemide  40 mg Oral BID    gabapentin  100 mg Oral TID    insulin aspart U-100  12 Units Subcutaneous TIDWM    insulin detemir U-100 (Levemir)  17 Units Subcutaneous BID    INV ASPIRIN 100MG/PLACEBO  100 mg Oral Daily    levETIRAcetam  1,000 mg Oral BID    mirtazapine  15 mg Oral Nightly    multivitamin  1 tablet Oral Daily    piperacillin-tazobactam (ZOSYN) IVPB  4.5 g Intravenous Q8H    remdesivir infusion  100 mg Intravenous Daily    sodium phosphate IVPB  30 mmol Intravenous Once    spironolactone  25 mg Oral Daily    valsartan  40 mg Oral BID    vancomycin (VANCOCIN) IVPB  1,250 mg Intravenous Q12H    warfarin  2 mg Oral Daily     PRN Meds:acetaminophen, cyclobenzaprine, dextrose 10%, dextrose 10%, dextrose 10%, dextrose 10%, dextrose, dextrose, glucagon (human recombinant), insulin aspart U-100, morphine, sodium chloride 0.9%, Pharmacy to dose Vancomycin consult **AND** vancomycin - pharmacy to dose    Review of patient's allergies indicates:   Allergen Reactions    Aspirin Other (See Comments)     Mr. Thacker is enrolled in Dr. Paige's Alon Trial and cannot have any aspirin and/or aspirin-containing products. DO NOT cancel any orders for INV Aspirin 100 mg/Placebo. If you have questions, please contact Isabel @ [phone], [phone],[email or web address], secure chat or MS Teams moises.    Bumex [bumetanide] Hives    Lactose Diarrhea     Other reaction(s): Abdominal distension, gaseous    Torsemide Hives     Objective:     Vital Signs  (Most Recent):  Temp: 96.7 °F (35.9 °C) (06/04/23 1226)  Pulse: 90 (06/04/23 1226)  Resp: 16 (06/04/23 1226)  BP: (!) 82/0 (06/04/23 1226)  SpO2: 97 % (06/04/23 1226) Vital Signs (24h Range):  Temp:  [96.7 °F (35.9 °C)-98.1 °F (36.7 °C)] 96.7 °F (35.9 °C)  Pulse:  [] 90  Resp:  [16-18] 16  SpO2:  [95 %-99 %] 97 %  BP: ()/(0-60) 82/0     Patient Vitals for the past 72 hrs (Last 3 readings):   Weight   06/02/23 2327 99.4 kg (219 lb 2.2 oz)     Body mass index is 27.39 kg/m².      Intake/Output Summary (Last 24 hours) at 6/4/2023 1257  Last data filed at 6/4/2023 1244  Gross per 24 hour   Intake 240 ml   Output 3000 ml   Net -2760 ml              Physical Exam  Constitutional:       Appearance: Normal appearance.   HENT:      Head: Normocephalic and atraumatic.      Mouth/Throat:      Mouth: Mucous membranes are moist.   Neck:      Comments: No elevation in JVP appreciated  Cardiovascular:      Rate and Rhythm: Normal rate.      Comments: Normal VAD hum can be heard  Pulmonary:      Effort: Pulmonary effort is normal. No respiratory distress.      Breath sounds: Normal breath sounds. No wheezing or rales.   Abdominal:      General: Abdomen is flat.      Tenderness: There is abdominal tenderness. There is guarding.      Comments: DLES with drainage around dressing on R anterior lower abdominal wall   Musculoskeletal:         General: No swelling.      Cervical back: Normal range of motion and neck supple.   Skin:     General: Skin is warm.      Capillary Refill: Capillary refill takes 2 to 3 seconds.      Comments: R sided PICC line in basilic vein   Neurological:      General: No focal deficit present.      Mental Status: He is alert and oriented to person, place, and time.   Psychiatric:         Mood and Affect: Mood normal.         Behavior: Behavior normal.          Significant Labs:  CBC:  Recent Labs   Lab 06/02/23  1651 06/03/23  0532 06/04/23  0513   WBC 13.06* 12.69 14.22*   RBC 5.29 4.92 5.37   HGB  13.3* 12.1* 13.2*   HCT 41.2* 39.4* 40.9    223 250   MCV 77.9* 80* 76*   MCH 25.1* 24.6* 24.6*   MCHC 32.3* 30.7* 32.3     BNP:  No results for input(s): BNP in the last 168 hours.    Invalid input(s): BNPTRIAGELBLO  CMP:  Recent Labs   Lab 06/02/23  1652 06/03/23  0532 06/04/23  0513   GLU  --  105 247*   CALCIUM 8.9 8.7 9.5   ALBUMIN 4.0  --   --    * 135* 133*   K 4.1 3.9 4.6   CO2 21* 21* 21*   CL  --  103 102   BUN 7.2* 8 11   CREATININE 1.65* 1.4 1.4   ALKPHOS 101  --   --    ALT 10  --   --    AST 22  --   --    BILITOT 1.0  --   --       Coagulation:   Recent Labs   Lab 06/03/23 0040 06/03/23  0532 06/04/23  0513   INR  --  2.0* 2.9*   APTT 36.6* 37.5* 39.6*     LDH:  Recent Labs   Lab 06/03/23 0532 06/04/23  0513    350*     Microbiology:  Microbiology Results (last 7 days)       Procedure Component Value Units Date/Time    Culture, Anaerobe [[id number]] Collected: 06/03/23 0135    Order Status: Completed Specimen: Skin from Abdomen Updated: 06/04/23 1009     Anaerobic Culture Culture in progress    Narrative:      DLES    Aerobic culture [[id number]] Collected: 06/03/23 0135    Order Status: Completed Specimen: Skin from Abdomen Updated: 06/04/23 0718     Aerobic Bacterial Culture Further report to follow    Narrative:      LVAD DLES    Blood culture [[id number]] Collected: 06/03/23 0040    Order Status: Completed Specimen: Blood Updated: 06/04/23 0613     Blood Culture, Routine No Growth to date      No Growth to date    Blood culture [[id number]] Collected: 06/03/23 0040    Order Status: Completed Specimen: Blood Updated: 06/04/23 0613     Blood Culture, Routine No Growth to date      No Growth to date            I have reviewed all pertinent labs within the past 24 hours.    Estimated Creatinine Clearance: 85.5 mL/min (based on SCr of 1.4 mg/dL).    Diagnostic Results:  I have reviewed and interpreted all pertinent imaging results/findings within the past 24 hours.

## 2023-06-04 NOTE — ASSESSMENT & PLAN NOTE
Patient tested positive for COVID on 6/2/23  Date of symptom onset: 5/30/22  O2 requirement: room air  Abx: vancomycin and zosyn (started on 6/3/23)  Trending inflammatory markers  Continue remdesivir

## 2023-06-04 NOTE — ASSESSMENT & PLAN NOTE
Procedure: Device Interrogation Including analysis of device parameters  Current Settings: Ventricular Assist Device  Review of device function is stable  Continue coumadin at home dose for INR goal of 2.0-3.0, monitor daily in the AM  Suspect DLES infection based on CT  Continue vanc+zosyn pending cultures    TXP LVAD INTERROGATIONS 6/4/2023 6/4/2023 6/4/2023 6/3/2023 6/3/2023 6/3/2023 6/3/2023   Type HeartMate3 HeartMate3 HeartMate3 HeartMate3 HeartMate3 HeartMate3 HeartMate3   Flow 4.5 4.5 4.5 4.4 4.4 4.1 4.0   Speed 5100 5150 5100 5100 5100 5100 5100   PI 3.7 3.6 3.3 3.0 3.5 4.7 5.1   Power (Serna) 3.7 3.8 3.7 3.7 3.7 3.7 3.7   LSL 4700 4750 4700 4700 4700 4700 4700   Low Flow Alarm - - - - - - -   High Power Alarm - - - - - - -   Pulsatility Intermittent pulse Intermittent pulse Intermittent pulse Intermittent pulse Intermittent pulse Intermittent pulse -

## 2023-06-04 NOTE — PROGRESS NOTES
Admit Attempt     LCSW tried to reach out to Pt by phone to complete admit today due to COVID isolation. [facility]TS LCSW will follow up with Pt tomorrow. SW providing ongoing psychosocial, counseling, & emotional support, education, resources, assistance, and discharge planning as indicated.  SW to continue to follow.

## 2023-06-05 ENCOUNTER — RESEARCH ENCOUNTER (OUTPATIENT)
Dept: RESEARCH | Facility: HOSPITAL | Age: 38
End: 2023-06-05
Payer: MEDICAID

## 2023-06-05 ENCOUNTER — TELEPHONE (OUTPATIENT)
Dept: TRANSPLANT | Facility: CLINIC | Age: 38
End: 2023-06-05
Payer: MEDICAID

## 2023-06-05 DIAGNOSIS — Z95.811 LVAD (LEFT VENTRICULAR ASSIST DEVICE) PRESENT: Primary | ICD-10-CM

## 2023-06-05 PROBLEM — T82.9XXA LEFT VENTRICULAR ASSIST DEVICE (LVAD) COMPLICATION, INITIAL ENCOUNTER: Status: ACTIVE | Noted: 2023-06-05

## 2023-06-05 LAB
ALBUMIN SERPL BCP-MCNC: 3.1 G/DL (ref 3.5–5.2)
ALBUMIN SERPL BCP-MCNC: 3.1 G/DL (ref 3.5–5.2)
ALP SERPL-CCNC: 82 U/L (ref 55–135)
ALP SERPL-CCNC: 84 U/L (ref 55–135)
ALT SERPL W/O P-5'-P-CCNC: 24 U/L (ref 10–44)
ALT SERPL W/O P-5'-P-CCNC: 26 U/L (ref 10–44)
ANION GAP SERPL CALC-SCNC: 8 MMOL/L (ref 8–16)
APTT PPP: 34.7 SEC (ref 21–32)
AST SERPL-CCNC: 35 U/L (ref 10–40)
AST SERPL-CCNC: 37 U/L (ref 10–40)
BACTERIA SPEC AEROBE CULT: ABNORMAL
BASOPHILS # BLD AUTO: 0.04 K/UL (ref 0–0.2)
BASOPHILS NFR BLD: 0.2 % (ref 0–1.9)
BILIRUB DIRECT SERPL-MCNC: 0.3 MG/DL (ref 0.1–0.3)
BILIRUB SERPL-MCNC: 0.5 MG/DL (ref 0.1–1)
BILIRUB SERPL-MCNC: 0.5 MG/DL (ref 0.1–1)
BNP SERPL-MCNC: 52 PG/ML (ref 0–99)
BUN SERPL-MCNC: 11 MG/DL (ref 6–20)
CALCIUM SERPL-MCNC: 9.3 MG/DL (ref 8.7–10.5)
CHLORIDE SERPL-SCNC: 99 MMOL/L (ref 95–110)
CO2 SERPL-SCNC: 28 MMOL/L (ref 23–29)
CREAT SERPL-MCNC: 1.2 MG/DL (ref 0.5–1.4)
CRP SERPL-MCNC: 37.7 MG/L (ref 0–8.2)
D DIMER PPP IA.FEU-MCNC: 1.32 MG/L FEU
DIFFERENTIAL METHOD: ABNORMAL
EOSINOPHIL # BLD AUTO: 0 K/UL (ref 0–0.5)
EOSINOPHIL NFR BLD: 0.1 % (ref 0–8)
ERYTHROCYTE [DISTWIDTH] IN BLOOD BY AUTOMATED COUNT: 21.5 % (ref 11.5–14.5)
EST. GFR  (NO RACE VARIABLE): >60 ML/MIN/1.73 M^2
FERRITIN SERPL-MCNC: 360 NG/ML (ref 20–300)
GLUCOSE SERPL-MCNC: 210 MG/DL (ref 70–110)
HCT VFR BLD AUTO: 38.8 % (ref 40–54)
HGB BLD-MCNC: 12.6 G/DL (ref 14–18)
IMM GRANULOCYTES # BLD AUTO: 0.07 K/UL (ref 0–0.04)
IMM GRANULOCYTES NFR BLD AUTO: 0.4 % (ref 0–0.5)
INR PPP: 2.6 (ref 0.8–1.2)
LDH SERPL L TO P-CCNC: 247 U/L (ref 110–260)
LYMPHOCYTES # BLD AUTO: 2.1 K/UL (ref 1–4.8)
LYMPHOCYTES NFR BLD: 12.6 % (ref 18–48)
MAGNESIUM SERPL-MCNC: 1.8 MG/DL (ref 1.6–2.6)
MCH RBC QN AUTO: 24.6 PG (ref 27–31)
MCHC RBC AUTO-ENTMCNC: 32.5 G/DL (ref 32–36)
MCV RBC AUTO: 76 FL (ref 82–98)
MONOCYTES # BLD AUTO: 0.7 K/UL (ref 0.3–1)
MONOCYTES NFR BLD: 4.3 % (ref 4–15)
NEUTROPHILS # BLD AUTO: 14 K/UL (ref 1.8–7.7)
NEUTROPHILS NFR BLD: 82.4 % (ref 38–73)
NRBC BLD-RTO: 0 /100 WBC
PHOSPHATE SERPL-MCNC: 3.5 MG/DL (ref 2.7–4.5)
PLATELET # BLD AUTO: 249 K/UL (ref 150–450)
PMV BLD AUTO: 9.5 FL (ref 9.2–12.9)
POCT GLUCOSE: 118 MG/DL (ref 70–110)
POCT GLUCOSE: 173 MG/DL (ref 70–110)
POCT GLUCOSE: 178 MG/DL (ref 70–110)
POTASSIUM SERPL-SCNC: 3.8 MMOL/L (ref 3.5–5.1)
PREALB SERPL-MCNC: 17 MG/DL (ref 20–43)
PROT SERPL-MCNC: 7.4 G/DL (ref 6–8.4)
PROT SERPL-MCNC: 7.5 G/DL (ref 6–8.4)
PROTHROMBIN TIME: 26.5 SEC (ref 9–12.5)
RBC # BLD AUTO: 5.12 M/UL (ref 4.6–6.2)
SODIUM SERPL-SCNC: 135 MMOL/L (ref 136–145)
WBC # BLD AUTO: 16.96 K/UL (ref 3.9–12.7)

## 2023-06-05 PROCEDURE — 85379 FIBRIN DEGRADATION QUANT: CPT | Performed by: STUDENT IN AN ORGANIZED HEALTH CARE EDUCATION/TRAINING PROGRAM

## 2023-06-05 PROCEDURE — 84100 ASSAY OF PHOSPHORUS: CPT | Performed by: STUDENT IN AN ORGANIZED HEALTH CARE EDUCATION/TRAINING PROGRAM

## 2023-06-05 PROCEDURE — 25000003 PHARM REV CODE 250: Performed by: STUDENT IN AN ORGANIZED HEALTH CARE EDUCATION/TRAINING PROGRAM

## 2023-06-05 PROCEDURE — 63600175 PHARM REV CODE 636 W HCPCS: Performed by: STUDENT IN AN ORGANIZED HEALTH CARE EDUCATION/TRAINING PROGRAM

## 2023-06-05 PROCEDURE — 99233 PR SUBSEQUENT HOSPITAL CARE,LEVL III: ICD-10-PCS | Mod: ,,, | Performed by: INTERNAL MEDICINE

## 2023-06-05 PROCEDURE — 87040 BLOOD CULTURE FOR BACTERIA: CPT | Mod: 59 | Performed by: STUDENT IN AN ORGANIZED HEALTH CARE EDUCATION/TRAINING PROGRAM

## 2023-06-05 PROCEDURE — 99233 SBSQ HOSP IP/OBS HIGH 50: CPT | Mod: ,,, | Performed by: STUDENT IN AN ORGANIZED HEALTH CARE EDUCATION/TRAINING PROGRAM

## 2023-06-05 PROCEDURE — 80053 COMPREHEN METABOLIC PANEL: CPT | Performed by: STUDENT IN AN ORGANIZED HEALTH CARE EDUCATION/TRAINING PROGRAM

## 2023-06-05 PROCEDURE — 99233 PR SUBSEQUENT HOSPITAL CARE,LEVL III: ICD-10-PCS | Mod: ,,, | Performed by: STUDENT IN AN ORGANIZED HEALTH CARE EDUCATION/TRAINING PROGRAM

## 2023-06-05 PROCEDURE — 83880 ASSAY OF NATRIURETIC PEPTIDE: CPT | Performed by: STUDENT IN AN ORGANIZED HEALTH CARE EDUCATION/TRAINING PROGRAM

## 2023-06-05 PROCEDURE — 83615 LACTATE (LD) (LDH) ENZYME: CPT | Performed by: STUDENT IN AN ORGANIZED HEALTH CARE EDUCATION/TRAINING PROGRAM

## 2023-06-05 PROCEDURE — 25000003 PHARM REV CODE 250: Performed by: INTERNAL MEDICINE

## 2023-06-05 PROCEDURE — 20600001 HC STEP DOWN PRIVATE ROOM

## 2023-06-05 PROCEDURE — 99231 SBSQ HOSP IP/OBS SF/LOW 25: CPT | Mod: ,,, | Performed by: NURSE PRACTITIONER

## 2023-06-05 PROCEDURE — 63600175 PHARM REV CODE 636 W HCPCS: Mod: JZ,TB | Performed by: STUDENT IN AN ORGANIZED HEALTH CARE EDUCATION/TRAINING PROGRAM

## 2023-06-05 PROCEDURE — 93750 PR INTERROGATE VENT ASSIST DEV, IN PERSON, W PHYSICIAN ANALYSIS: ICD-10-PCS | Mod: ,,, | Performed by: INTERNAL MEDICINE

## 2023-06-05 PROCEDURE — 85730 THROMBOPLASTIN TIME PARTIAL: CPT | Performed by: STUDENT IN AN ORGANIZED HEALTH CARE EDUCATION/TRAINING PROGRAM

## 2023-06-05 PROCEDURE — 63600175 PHARM REV CODE 636 W HCPCS: Performed by: INTERNAL MEDICINE

## 2023-06-05 PROCEDURE — 83735 ASSAY OF MAGNESIUM: CPT | Performed by: STUDENT IN AN ORGANIZED HEALTH CARE EDUCATION/TRAINING PROGRAM

## 2023-06-05 PROCEDURE — 94640 AIRWAY INHALATION TREATMENT: CPT

## 2023-06-05 PROCEDURE — 82728 ASSAY OF FERRITIN: CPT | Performed by: STUDENT IN AN ORGANIZED HEALTH CARE EDUCATION/TRAINING PROGRAM

## 2023-06-05 PROCEDURE — 80076 HEPATIC FUNCTION PANEL: CPT | Performed by: STUDENT IN AN ORGANIZED HEALTH CARE EDUCATION/TRAINING PROGRAM

## 2023-06-05 PROCEDURE — 99231 PR SUBSEQUENT HOSPITAL CARE,LEVL I: ICD-10-PCS | Mod: ,,, | Performed by: NURSE PRACTITIONER

## 2023-06-05 PROCEDURE — 84134 ASSAY OF PREALBUMIN: CPT | Performed by: STUDENT IN AN ORGANIZED HEALTH CARE EDUCATION/TRAINING PROGRAM

## 2023-06-05 PROCEDURE — 99233 SBSQ HOSP IP/OBS HIGH 50: CPT | Mod: ,,, | Performed by: INTERNAL MEDICINE

## 2023-06-05 PROCEDURE — 27000248 HC VAD-ADDITIONAL DAY

## 2023-06-05 PROCEDURE — 86140 C-REACTIVE PROTEIN: CPT | Performed by: STUDENT IN AN ORGANIZED HEALTH CARE EDUCATION/TRAINING PROGRAM

## 2023-06-05 PROCEDURE — 94761 N-INVAS EAR/PLS OXIMETRY MLT: CPT

## 2023-06-05 PROCEDURE — 85025 COMPLETE CBC W/AUTO DIFF WBC: CPT | Performed by: STUDENT IN AN ORGANIZED HEALTH CARE EDUCATION/TRAINING PROGRAM

## 2023-06-05 PROCEDURE — 93750 INTERROGATION VAD IN PERSON: CPT | Mod: ,,, | Performed by: INTERNAL MEDICINE

## 2023-06-05 PROCEDURE — 85610 PROTHROMBIN TIME: CPT | Performed by: STUDENT IN AN ORGANIZED HEALTH CARE EDUCATION/TRAINING PROGRAM

## 2023-06-05 PROCEDURE — 27000207 HC ISOLATION

## 2023-06-05 RX ORDER — MAGNESIUM SULFATE HEPTAHYDRATE 40 MG/ML
2 INJECTION, SOLUTION INTRAVENOUS ONCE
Status: COMPLETED | OUTPATIENT
Start: 2023-06-05 | End: 2023-06-05

## 2023-06-05 RX ORDER — POLYETHYLENE GLYCOL 3350 17 G/17G
17 POWDER, FOR SOLUTION ORAL DAILY
Status: DISCONTINUED | OUTPATIENT
Start: 2023-06-06 | End: 2023-06-05

## 2023-06-05 RX ORDER — POLYETHYLENE GLYCOL 3350 17 G/17G
17 POWDER, FOR SOLUTION ORAL DAILY
Status: DISCONTINUED | OUTPATIENT
Start: 2023-06-05 | End: 2023-06-07 | Stop reason: HOSPADM

## 2023-06-05 RX ORDER — POTASSIUM CHLORIDE 20 MEQ/1
40 TABLET, EXTENDED RELEASE ORAL ONCE
Status: COMPLETED | OUTPATIENT
Start: 2023-06-05 | End: 2023-06-05

## 2023-06-05 RX ORDER — SENNOSIDES 8.6 MG/1
8.6 TABLET ORAL DAILY PRN
Status: DISCONTINUED | OUTPATIENT
Start: 2023-06-05 | End: 2023-06-07 | Stop reason: HOSPADM

## 2023-06-05 RX ADMIN — OXYCODONE HYDROCHLORIDE AND ACETAMINOPHEN 500 MG: 500 TABLET ORAL at 09:06

## 2023-06-05 RX ADMIN — ALBUTEROL SULFATE 2 PUFF: 108 AEROSOL, METERED RESPIRATORY (INHALATION) at 06:06

## 2023-06-05 RX ADMIN — MIRTAZAPINE 15 MG: 15 TABLET, FILM COATED ORAL at 09:06

## 2023-06-05 RX ADMIN — INSULIN ASPART 12 UNITS: 100 INJECTION, SOLUTION INTRAVENOUS; SUBCUTANEOUS at 09:06

## 2023-06-05 RX ADMIN — INSULIN ASPART 1 UNITS: 100 INJECTION, SOLUTION INTRAVENOUS; SUBCUTANEOUS at 10:06

## 2023-06-05 RX ADMIN — ALBUTEROL SULFATE 2 PUFF: 108 AEROSOL, METERED RESPIRATORY (INHALATION) at 01:06

## 2023-06-05 RX ADMIN — FUROSEMIDE 40 MG: 40 TABLET ORAL at 06:06

## 2023-06-05 RX ADMIN — VANCOMYCIN HYDROCHLORIDE 1250 MG: 1.25 INJECTION, POWDER, LYOPHILIZED, FOR SOLUTION INTRAVENOUS at 01:06

## 2023-06-05 RX ADMIN — CEFAZOLIN 2 G: 2 INJECTION, POWDER, FOR SOLUTION INTRAMUSCULAR; INTRAVENOUS at 01:06

## 2023-06-05 RX ADMIN — INSULIN ASPART 12 UNITS: 100 INJECTION, SOLUTION INTRAVENOUS; SUBCUTANEOUS at 12:06

## 2023-06-05 RX ADMIN — Medication 100 MG: at 12:06

## 2023-06-05 RX ADMIN — POTASSIUM CHLORIDE 40 MEQ: 1500 TABLET, EXTENDED RELEASE ORAL at 09:06

## 2023-06-05 RX ADMIN — SENNOSIDES 8.6 MG: 8.6 TABLET, FILM COATED ORAL at 06:06

## 2023-06-05 RX ADMIN — PIPERACILLIN AND TAZOBACTAM 4.5 G: 4; .5 INJECTION, POWDER, LYOPHILIZED, FOR SOLUTION INTRAVENOUS; PARENTERAL at 11:06

## 2023-06-05 RX ADMIN — INSULIN ASPART 12 UNITS: 100 INJECTION, SOLUTION INTRAVENOUS; SUBCUTANEOUS at 04:06

## 2023-06-05 RX ADMIN — MILRINONE LACTATE IN DEXTROSE 0.25 MCG/KG/MIN: 200 INJECTION, SOLUTION INTRAVENOUS at 07:06

## 2023-06-05 RX ADMIN — ALBUTEROL SULFATE 2 PUFF: 108 AEROSOL, METERED RESPIRATORY (INHALATION) at 07:06

## 2023-06-05 RX ADMIN — FUROSEMIDE 40 MG: 40 TABLET ORAL at 09:06

## 2023-06-05 RX ADMIN — FAMOTIDINE 40 MG: 20 TABLET, FILM COATED ORAL at 09:06

## 2023-06-05 RX ADMIN — REMDESIVIR 100 MG: 100 INJECTION, POWDER, LYOPHILIZED, FOR SOLUTION INTRAVENOUS at 09:06

## 2023-06-05 RX ADMIN — THERA TABS 1 TABLET: TAB at 09:06

## 2023-06-05 RX ADMIN — INSULIN ASPART 2 UNITS: 100 INJECTION, SOLUTION INTRAVENOUS; SUBCUTANEOUS at 12:06

## 2023-06-05 RX ADMIN — LEVETIRACETAM 1000 MG: 500 TABLET, FILM COATED ORAL at 09:06

## 2023-06-05 RX ADMIN — ACETAMINOPHEN 1000 MG: 500 TABLET ORAL at 06:06

## 2023-06-05 RX ADMIN — VALSARTAN 40 MG: 40 TABLET, FILM COATED ORAL at 09:06

## 2023-06-05 RX ADMIN — WARFARIN SODIUM 4.5 MG: 2.5 TABLET ORAL at 04:06

## 2023-06-05 RX ADMIN — SPIRONOLACTONE 25 MG: 25 TABLET, FILM COATED ORAL at 09:06

## 2023-06-05 RX ADMIN — PIPERACILLIN AND TAZOBACTAM 4.5 G: 4; .5 INJECTION, POWDER, LYOPHILIZED, FOR SOLUTION INTRAVENOUS; PARENTERAL at 04:06

## 2023-06-05 RX ADMIN — MAGNESIUM SULFATE 2 G: 2 INJECTION INTRAVENOUS at 09:06

## 2023-06-05 RX ADMIN — INSULIN ASPART 2 UNITS: 100 INJECTION, SOLUTION INTRAVENOUS; SUBCUTANEOUS at 04:06

## 2023-06-05 RX ADMIN — BUSPIRONE HYDROCHLORIDE 7.5 MG: 5 TABLET ORAL at 09:06

## 2023-06-05 RX ADMIN — CEFAZOLIN 2 G: 2 INJECTION, POWDER, FOR SOLUTION INTRAMUSCULAR; INTRAVENOUS at 09:06

## 2023-06-05 NOTE — ASSESSMENT & PLAN NOTE
Patient tested positive for COVID on 6/2/2023  Date of symptom onset: 5/30/22  O2 requirement: room air  Abx: vancomycin and zosyn (started on 6/3/2023)  Trending inflammatory markers  Continue remdesivir (6/3/2023 - 6/7/2023)

## 2023-06-05 NOTE — SUBJECTIVE & OBJECTIVE
Subjective:     Post-Op Info:  * No surgery found *              Interval History: admitted for drive line infection. afebrile WBC 16.9, on vanc and zosyn. Blood cx pending. Driveline cx revealed staph aureus.   {Implant/Explant:98982} ***    Medications:  Continuous Infusions:   milrinone 20mg/100ml D5W (200mcg/ml) 0.25 mcg/kg/min (06/05/23 0724)     Scheduled Meds:   albuterol  2 puff Inhalation Q6H    ascorbic acid (vitamin C)  500 mg Oral BID    busPIRone  7.5 mg Oral Daily    ceFAZolin (ANCEF) IVPB  2 g Intravenous Q8H    famotidine  40 mg Oral Daily    furosemide  40 mg Oral BID    insulin aspart U-100  12 Units Subcutaneous TIDWM    insulin detemir U-100 (Levemir)  17 Units Subcutaneous BID    INV ASPIRIN 100MG/PLACEBO  100 mg Oral Daily    levETIRAcetam  1,000 mg Oral BID    mirtazapine  15 mg Oral Nightly    multivitamin  1 tablet Oral Daily    spironolactone  25 mg Oral Daily    valsartan  40 mg Oral BID    warfarin  4.5 mg Oral Daily     PRN Meds:acetaminophen, cyclobenzaprine, dextrose 10%, dextrose 10%, dextrose 10%, dextrose 10%, dextrose, dextrose, glucagon (human recombinant), insulin aspart U-100, sodium chloride 0.9%     Objective:     Vital Signs (Most Recent):  Temp: 96.8 °F (36 °C) (06/05/23 1134)  Pulse: 93 (06/05/23 1136)  Resp: 18 (06/05/23 1134)  BP: 105/60 (06/05/23 1134)  SpO2: 99 % (06/05/23 1134) Vital Signs (24h Range):  Temp:  [96.8 °F (36 °C)-98.5 °F (36.9 °C)] 96.8 °F (36 °C)  Pulse:  [] 93  Resp:  [16-19] 18  SpO2:  [93 %-100 %] 99 %  BP: ()/(0-64) 105/60       Intake/Output Summary (Last 24 hours) at 6/5/2023 1253  Last data filed at 6/5/2023 1142  Gross per 24 hour   Intake 1080 ml   Output 3150 ml   Net -2070 ml       Physical Exam    Significant Labs:  {Results:16505}    Significant Diagnostics:  {Imaging Review:49424}    Procedure: Device Interrogation Including analysis of device parameters  Current Settings: Ventricular Assist Device  Review of  device function is {stable/unstable:597497}  TXP LVAD INTERROGATIONS 6/5/2023 6/5/2023 6/5/2023 6/5/2023 6/4/2023 6/4/2023 6/4/2023   Type HeartMate3 HeartMate3 HeartMate3 HeartMate3 HeartMate3 HeartMate3 HeartMate3   Flow 3.8 3.6 3.9 4.2 4.3 4.5 4.5   Speed 5100 5100 5100 5100 5100 5100 5100   PI 5.9 3.9 4.5 4.5 4.4 3.5 3.7   Power (Serna) 3.7 3.6 3.7 3.6 3.7 3.6 3.7   LSL 4700 4700 4700 4700 4700 4700 4700   Low Flow Alarm - - - - - - -   High Power Alarm - - - - - - -   Pulsatility Pulse Intermittent pulse Intermittent pulse Intermittent pulse Intermittent pulse Intermittent pulse Intermittent pulse

## 2023-06-05 NOTE — ASSESSMENT & PLAN NOTE
BG goal: 140-180    - Levemir 17 units BID,  - Novolog 12 units AC  - Adjust Novolog correction dose with ISF 25 starting at 150  given steroids  - POCT Glucose before meals and at bedtime  - Hypoglycemia protocol in place      ** Please notify Endocrine for any change and/or advance in diet**  ** Please call Endocrine for any BG related issues **     Discharge Planning:   TBD. Please notify endocrinology prior to discharge.

## 2023-06-05 NOTE — PLAN OF CARE
AAOX4,VSS,O2 sats>92% on RA. Patient ambulating independently, fall precautions in place.LVAD DP and numbers WNL, smooth LVAD hum. Patient has no complaints of pain/SOB. Pt with Milrinone gtt 0.25 mcg/kg/min. Discussed medications and care. Patient has no questions at this time. Pt visualised and stable.Pt resting well,call light within reach, bed at the lowest position.    Problem: Adult Inpatient Plan of Care  Goal: Plan of Care Review  Outcome: Ongoing, Progressing  Goal: Absence of Hospital-Acquired Illness or Injury  Outcome: Ongoing, Progressing  Goal: Optimal Comfort and Wellbeing  Outcome: Ongoing, Progressing     Problem: Diabetes Comorbidity  Goal: Blood Glucose Level Within Targeted Range  Outcome: Ongoing, Progressing     Problem: Fall Injury Risk  Goal: Absence of Fall and Fall-Related Injury  Outcome: Ongoing, Progressing

## 2023-06-05 NOTE — SUBJECTIVE & OBJECTIVE
Interval HPI:   Overnight events: No acute events overnight. Patient in room 307/307 A. Blood glucose stable. BG at goal on current insulin regimen (SSI, prandial, and basal insulin ). Steroid use- None.    Renal function- Normal   Vasopressors-  None       Endocrine will continue to follow and manage insulin orders inpatient.         Diet Vegetarian Lacto Ovo (and additional linked orders)     Eatin%  Nausea: No  Hypoglycemia and intervention: No  Fever: No  TPN and/or TF: No    BP (!) 87/59 (BP Location: Left arm, Patient Position: Lying)   Pulse 80   Temp 97.2 °F (36.2 °C) (Oral)   Resp 16   Wt 99.4 kg (219 lb 2.2 oz)   SpO2 97%   BMI 27.39 kg/m²     Labs Reviewed and Include    Recent Labs   Lab 23  0419   *   CALCIUM 9.3   ALBUMIN 3.1*   PROT 7.5   *   K 3.8   CO2 28   CL 99   BUN 11   CREATININE 1.2   ALKPHOS 82   ALT 24   AST 37   BILITOT 0.5     Lab Results   Component Value Date    WBC 16.96 (H) 2023    HGB 12.6 (L) 2023    HCT 38.8 (L) 2023    MCV 76 (L) 2023     2023     No results for input(s): TSH, FREET4 in the last 168 hours.  Lab Results   Component Value Date    HGBA1C 7.9 (H) 2023       Nutritional status:   Body mass index is 27.39 kg/m².  Lab Results   Component Value Date    ALBUMIN 3.1 (L) 2023    ALBUMIN 3.1 (L) 2023    ALBUMIN 4.0 2023     Lab Results   Component Value Date    PREALBUMIN 17 (L) 2023    PREALBUMIN 26 2023    PREALBUMIN 22 2023       Estimated Creatinine Clearance: 99.8 mL/min (based on SCr of 1.2 mg/dL).    Accu-Checks  Recent Labs     23  2339 23  0853 23  1156 23  1703 23  0824 23  1233 23  1632 23  2101 23  0918   POCTGLUCOSE 144* 131* 136* 258* 303* 179* 161* 169* 195* 118*       Current Medications and/or Treatments Impacting Glycemic Control  Immunotherapy:    Immunosuppressants       None           Steroids:   Hormones (From admission, onward)      None          Pressors:    Autonomic Drugs (From admission, onward)      None          Hyperglycemia/Diabetes Medications:   Antihyperglycemics (From admission, onward)      Start     Stop Route Frequency Ordered    06/04/23 0900  insulin detemir U-100 (Levemir) pen 17 Units         -- SubQ 2 times daily 06/04/23 0648    06/04/23 0715  insulin aspart U-100 pen 12 Units         -- SubQ 3 times daily with meals 06/04/23 0648    06/03/23 1345  insulin aspart U-100 pen 1-10 Units         -- SubQ Before meals & nightly PRN 06/03/23 1245

## 2023-06-05 NOTE — PROGRESS NOTES
06/05/2023  Ruma Li    Current provider:  Angela Yang DO    Device interrogation:  TXP LVAD INTERROGATIONS 6/5/2023 6/5/2023 6/5/2023 6/4/2023 6/4/2023 6/4/2023 6/4/2023   Type HeartMate3 HeartMate3 HeartMate3 HeartMate3 HeartMate3 HeartMate3 HeartMate3   Flow 3.6 3.9 4.2 4.3 4.5 4.5 4.5   Speed 5100 5100 5100 5100 5100 5100 5150   PI 3.9 4.5 4.5 4.4 3.5 3.7 3.6   Power (Serna) 3.6 3.7 3.6 3.7 3.6 3.7 3.8   LSL 4700 4700 4700 4700 4700 4700 4750   Low Flow Alarm - - - - - - -   High Power Alarm - - - - - - -   Pulsatility Intermittent pulse Intermittent pulse Intermittent pulse Intermittent pulse Intermittent pulse Intermittent pulse Intermittent pulse          Rounded on Kevan Queen to ensure all mechanical assist device settings (IABP or VAD) were appropriate and all parameters were within limits.  I was able to ensure all back up equipment was present, the staff had no issues, and the Perfusion Department daily rounding was complete.      For implantable VADs: Interrogation of Ventricular assist device was performed with analysis of device parameters and review of device function. I have personally reviewed the interrogation findings and agree with findings as stated.     In emergency, the nursing units have been notified to contact the perfusion department either by:  Calling [id number] from 630am to 4pm Mon thru Fri, utilizing the On-Call Finder functionality of Epic and searching for Perfusion, or by contacting the hospital  from 4pm to 630am and on weekends and asking to speak with the perfusionist on call.    10:26 AM

## 2023-06-05 NOTE — TELEPHONE ENCOUNTER
Patient's wife paged reporting that patient is in the ER, family had been sick for some days leading up to visit but patient also started having DLES drainage that was purulent. While in the ER, patient was tested for COVID and it came back positive. On call MD Yang notified and patient to be transferred.

## 2023-06-05 NOTE — ASSESSMENT & PLAN NOTE
- Last Echo (4/19/2023): HM3 5100. IV septum midline. AV does not open. EF 10%. LV severely enlarged w/ severely decreased systolic function. Indeterminate LV diastolic dysfunction. Severe RV enlargment w/ mod severely reduced RV systolic function. Biatrial enlargment. Mod to severe TR. Mild to mod MR. PA sys pressure 44mmHg.   - Last RHC (10/31/2022): RA: 21/ 20/ 18, RV: 35/ 7/ 22, PA: 35/ 19/ 24, PWP: 22/ 22/ 21. CO 3.55  by Naomi. CI 1.6.  - Home GDMT: Spironolactone 25mg daily and Valsartan 40mg BID  - Home diuretic regimen: Lasix 40mg bid  - Euvolemic on exam today. Continue home GDMT and diuretic regimen.   - Ionotropes: Continue chronic mil 0.25  - Strict I&Os and daily weights.   - Maintain K>4 and Mg>2

## 2023-06-05 NOTE — PROGRESS NOTES
[facility] - [facility] Step[facility]  Endocrinology  Progress Note    Admit Date: 2023     Reason for Consult: Management of T2DM, Hyperglycemia      Surgical Procedure and Date: LVAD 2022     Diabetes diagnosis year:      Home Diabetes Medications:   -Levemir 22 units BID.   -Novolog 16 units TIDWM in addition to the following correction scale:     150 - 200 + 1 unit     201 - 250 + 2 units     251 - 300 + 3 units     301 - 350 + 4 units      > 350   + 5 units     How often checking glucose at home?  Uses Freestyle William    BG readings on regimen: 92-180s  Hypoglycemia on the regimen?  No  Missed doses on regimen?  occasionally skips mealsn and will skip Novolog with breakfast      Diabetes Complications include:     Hyperglycemia     Complicating diabetes co morbidities:   History of MI, CHF, and CKD        HPI:   Pt is a 39 yo M w/ stage d HFrEF, NICM, ? Familial CM (Father had LVAD and subsequent heart transplant), h/o PSA, DM2 on insulin who is s/p DT-HM3 implantation 2022, post op course complicated by early RV failure requiring RVAD with ProTek Duo after admitted with ADHF/cardiogenic shock on home milrinone requiring IABP. He underwent RVAD removal and chest closure 2022. Presents w/ F and DLES drainage.  COVID positive.  Endocrine consulted for BG management.      Interval HPI:   Overnight events: No acute events overnight. Patient in room 307/307 A. Blood glucose stable. BG at goal on current insulin regimen (SSI, prandial, and basal insulin ). Steroid use- None.    Renal function- Normal   Vasopressors-  None       Endocrine will continue to follow and manage insulin orders inpatient.         Diet Vegetarian Lacto Ovo (and additional linked orders)     Eatin%  Nausea: No  Hypoglycemia and intervention: No  Fever: No  TPN and/or TF: No    BP (!) 87/59 (BP Location: Left arm, Patient Position: Lying)   Pulse 80   Temp 97.2 °F (36.2 °C) (Oral)   Resp 16   Wt 99.4 kg (219 lb 2.2  oz)   SpO2 97%   BMI 27.39 kg/m²     Labs Reviewed and Include    Recent Labs   Lab 06/05/23  0419   *   CALCIUM 9.3   ALBUMIN 3.1*   PROT 7.5   *   K 3.8   CO2 28   CL 99   BUN 11   CREATININE 1.2   ALKPHOS 82   ALT 24   AST 37   BILITOT 0.5     Lab Results   Component Value Date    WBC 16.96 (H) 06/05/2023    HGB 12.6 (L) 06/05/2023    HCT 38.8 (L) 06/05/2023    MCV 76 (L) 06/05/2023     06/05/2023     No results for input(s): TSH, FREET4 in the last 168 hours.  Lab Results   Component Value Date    HGBA1C 7.9 (H) 04/02/2023       Nutritional status:   Body mass index is 27.39 kg/m².  Lab Results   Component Value Date    ALBUMIN 3.1 (L) 06/05/2023    ALBUMIN 3.1 (L) 06/05/2023    ALBUMIN 4.0 06/02/2023     Lab Results   Component Value Date    PREALBUMIN 17 (L) 06/05/2023    PREALBUMIN 26 04/19/2023    PREALBUMIN 22 04/19/2023       Estimated Creatinine Clearance: 99.8 mL/min (based on SCr of 1.2 mg/dL).    Accu-Checks  Recent Labs     06/02/23  2339 06/03/23  0853 06/03/23  1156 06/03/23  1703 06/03/23  2006 06/04/23  0824 06/04/23  1233 06/04/23  1632 06/04/23  2101 06/05/23  0918   POCTGLUCOSE 144* 131* 136* 258* 303* 179* 161* 169* 195* 118*       Current Medications and/or Treatments Impacting Glycemic Control  Immunotherapy:    Immunosuppressants       None          Steroids:   Hormones (From admission, onward)      None          Pressors:    Autonomic Drugs (From admission, onward)      None          Hyperglycemia/Diabetes Medications:   Antihyperglycemics (From admission, onward)      Start     Stop Route Frequency Ordered    06/04/23 0900  insulin detemir U-100 (Levemir) pen 17 Units         -- SubQ 2 times daily 06/04/23 0648    06/04/23 0715  insulin aspart U-100 pen 12 Units         -- SubQ 3 times daily with meals 06/04/23 0648    06/03/23 1345  insulin aspart U-100 pen 1-10 Units         -- SubQ Before meals & nightly PRN 06/03/23 1245            ASSESSMENT and  PLAN    Cardiac/Vascular  LVAD (left ventricular assist device) present  Managed by primary team      ID  COVID-19  Managed by primary team.  Optimize bg control      Endocrine  Type 2 diabetes mellitus with hyperglycemia  BG goal: 140-180    - Levemir 17 units BID,  - Novolog 12 units AC  - Adjust Novolog correction dose with ISF 25 starting at 150  given steroids  - POCT Glucose before meals and at bedtime  - Hypoglycemia protocol in place      ** Please notify Endocrine for any change and/or advance in diet**  ** Please call Endocrine for any BG related issues **     Discharge Planning:   TBD. Please notify endocrinology prior to discharge.          Other  * Left ventricular assist device (LVAD) complication, initial encounter  Managed per primary team  Avoid hypoglycemia             Michael Wyman, DNP, FNP  Endocrinology  [facility] - [facility]iology Step[facility]

## 2023-06-05 NOTE — CARE UPDATE
RAPID RESPONSE NURSE CHART REVIEW       Chart Reviewed: 06/04/2023, 11:00 PM    MRN: [id number]  Bed: 307/307 A    Dx: COVID-19    Kevan Queen has a past medical history of Arthritis, Cardiomyopathy, CHF (congestive heart failure), Diabetes mellitus, Dilated cardiomyopathy, Drug abuse, Hyperosmolar hyperglycemic state (HHS), ICD (implantable cardioverter-defibrillator) in place, Muscle cramping, and Renal disorder.    Last VS: BP 91/64 (BP Location: Left arm, Patient Position: Lying)   Pulse 87   Temp 98.5 °F (36.9 °C) (Oral)   Resp 16   Wt 99.4 kg (219 lb 2.2 oz)   SpO2 100%   BMI 27.39 kg/m²     24H Vital Sign Range:  Temp:  [96.7 °F (35.9 °C)-98.5 °F (36.9 °C)]   Pulse:  []   Resp:  [16-19]   BP: ()/(0-64)   SpO2:  [93 %-100 %]     Level of Consciousness (AVPU): alert    Recent Labs     06/02/23  1651 06/03/23  0532 06/04/23  0513   WBC 13.06* 12.69 14.22*   HGB 13.3* 12.1* 13.2*   HCT 41.2* 39.4* 40.9    223 250       Recent Labs     06/03/23  0532 06/04/23  0513 06/04/23  1638   * 133* 137   K 3.9 4.6 4.3    102 102   CO2 21* 21* 25   CREATININE 1.4 1.4 1.3    247* 200*   PHOS 2.9 2.0*  --    MG 1.8 2.1  --         No results for input(s): PH, PCO2, PO2, HCO3, POCSATURATED, BE in the last 72 hours.     OXYGEN:             MEWS score: 1    Bedside RNDiane  contacted. HR 90s-100.  No concerns verbalized at this time. Instructed to call [phone] for further concerns or assistance.    Otoniel Coleman RN

## 2023-06-05 NOTE — PROGRESS NOTES
[facility] - Cardiology Stepdown  Heart Transplant  Progress Note    Patient Name: Kevan Queen  MRN: [id number]  Admission Date: 6/2/2023  Hospital Length of Stay: 3 days  Attending Physician: Angela Yang DO  Primary Care Provider: ORALIA Cline  Principal Problem:Left ventricular assist device (LVAD) complication, initial encounter    Subjective:     Interval History: NAEON. No acute complaints.     Continuous Infusions:   milrinone 20mg/100ml D5W (200mcg/ml) 0.25 mcg/kg/min (06/05/23 0724)     Scheduled Meds:   albuterol  2 puff Inhalation Q6H    ascorbic acid (vitamin C)  500 mg Oral BID    busPIRone  7.5 mg Oral Daily    famotidine  40 mg Oral Daily    furosemide  40 mg Oral BID    insulin aspart U-100  12 Units Subcutaneous TIDWM    insulin detemir U-100 (Levemir)  17 Units Subcutaneous BID    INV ASPIRIN 100MG/PLACEBO  100 mg Oral Daily    levETIRAcetam  1,000 mg Oral BID    magnesium sulfate IVPB  2 g Intravenous Once    mirtazapine  15 mg Oral Nightly    multivitamin  1 tablet Oral Daily    piperacillin-tazobactam (ZOSYN) IVPB  4.5 g Intravenous Q8H    potassium chloride  40 mEq Oral Once    remdesivir infusion  100 mg Intravenous Daily    spironolactone  25 mg Oral Daily    valsartan  40 mg Oral BID    vancomycin (VANCOCIN) IVPB  1,250 mg Intravenous Q12H    warfarin  2 mg Oral Daily     PRN Meds:acetaminophen, cyclobenzaprine, dextrose 10%, dextrose 10%, dextrose 10%, dextrose 10%, dextrose, dextrose, glucagon (human recombinant), insulin aspart U-100, sodium chloride 0.9%, Pharmacy to dose Vancomycin consult **AND** vancomycin - pharmacy to dose    Review of patient's allergies indicates:   Allergen Reactions    Aspirin Other (See Comments)     Mr. Thacker is enrolled in Dr. Paige's Alon Trial and cannot have any aspirin and/or aspirin-containing products. DO NOT cancel any orders for INV Aspirin 100 mg/Placebo. If you have questions, please contact Isabel @ [phone],  [phone],[email or web address], secure chat or MS Teams message.    Bumex [bumetanide] Hives    Lactose Diarrhea     Other reaction(s): Abdominal distension, gaseous    Torsemide Hives     Objective:     Vital Signs (Most Recent):  Temp: 97.2 °F (36.2 °C) (06/05/23 0729)  Pulse: 80 (06/05/23 0729)  Resp: 16 (06/05/23 0729)  BP: (!) 87/59 (06/05/23 0730)  SpO2: 97 % (06/05/23 0729) Vital Signs (24h Range):  Temp:  [96.7 °F (35.9 °C)-98.5 °F (36.9 °C)] 97.2 °F (36.2 °C)  Pulse:  [] 80  Resp:  [16-19] 16  SpO2:  [93 %-100 %] 97 %  BP: (62-91)/(0-64) 87/59     Patient Vitals for the past 72 hrs (Last 3 readings):   Weight   06/02/23 2327 99.4 kg (219 lb 2.2 oz)       Body mass index is 27.39 kg/m².      Intake/Output Summary (Last 24 hours) at 6/5/2023 0832  Last data filed at 6/5/2023 0732  Gross per 24 hour   Intake 600 ml   Output 3450 ml   Net -2850 ml                Physical Exam  Constitutional:       Appearance: Normal appearance.   HENT:      Head: Normocephalic and atraumatic.      Mouth/Throat:      Mouth: Mucous membranes are moist.   Neck:      Comments: No elevation in JVP appreciated  Cardiovascular:      Rate and Rhythm: Normal rate.      Comments: Normal VAD hum can be heard  Pulmonary:      Effort: Pulmonary effort is normal. No respiratory distress.      Breath sounds: Normal breath sounds. No wheezing or rales.   Abdominal:      General: Abdomen is flat.      Tenderness: There is no abdominal tenderness. There is no guarding.   Musculoskeletal:         General: No swelling.      Cervical back: Normal range of motion and neck supple.   Skin:     General: Skin is warm.      Capillary Refill: Capillary refill takes 2 to 3 seconds.      Comments: R sided PICC line in basilic vein   Neurological:      General: No focal deficit present.      Mental Status: He is alert and oriented to person, place, and time.   Psychiatric:         Mood and Affect: Mood normal.         Behavior: Behavior normal.           Significant Labs:  CBC:  Recent Labs   Lab 06/03/23 0532 06/04/23 0513 06/05/23 0418   WBC 12.69 14.22* 16.96*   RBC 4.92 5.37 5.12   HGB 12.1* 13.2* 12.6*   HCT 39.4* 40.9 38.8*    250 249   MCV 80* 76* 76*   MCH 24.6* 24.6* 24.6*   MCHC 30.7* 32.3 32.5       BNP:  Recent Labs   Lab 06/05/23 0418   BNP 52     CMP:  Recent Labs   Lab 06/02/23  1652 06/03/23 0532 06/04/23 0513 06/04/23  1638 06/05/23 0418 06/05/23 0419   GLU  --    < > 247* 200*  --  210*   CALCIUM 8.9   < > 9.5 9.4  --  9.3   ALBUMIN 4.0  --   --   --  3.1* 3.1*   PROT  --   --   --   --  7.4 7.5   *   < > 133* 137  --  135*   K 4.1   < > 4.6 4.3  --  3.8   CO2 21*   < > 21* 25  --  28   CL  --    < > 102 102  --  99   BUN 7.2*   < > 11 13  --  11   CREATININE 1.65*   < > 1.4 1.3  --  1.2   ALKPHOS 101  --   --   --  84 82   ALT 10  --   --   --  26 24   AST 22  --   --   --  35 37   BILITOT 1.0  --   --   --  0.5 0.5    < > = values in this interval not displayed.        Coagulation:   Recent Labs   Lab 06/03/23 0532 06/04/23 0513 06/05/23 0418   INR 2.0* 2.9* 2.6*   APTT 37.5* 39.6* 34.7*       LDH:  Recent Labs   Lab 06/03/23 0532 06/04/23 0513 06/05/23 0418    350* 247       Microbiology:  Microbiology Results (last 7 days)       Procedure Component Value Units Date/Time    Blood culture [[id number]] Collected: 06/03/23 0040    Order Status: Completed Specimen: Blood Updated: 06/05/23 0612     Blood Culture, Routine No Growth to date      No Growth to date      No Growth to date    Blood culture [[id number]] Collected: 06/03/23 0040    Order Status: Completed Specimen: Blood Updated: 06/05/23 0612     Blood Culture, Routine No Growth to date      No Growth to date      No Growth to date    Culture, Anaerobe [[id number]] Collected: 06/03/23 0135    Order Status: Completed Specimen: Skin from Abdomen Updated: 06/04/23 1009     Anaerobic Culture Culture in progress    Narrative:      DLES    Aerobic culture  [[id number]] Collected: 06/03/23 0135    Order Status: Completed Specimen: Skin from Abdomen Updated: 06/04/23 0718     Aerobic Bacterial Culture Further report to follow    Narrative:      LVAD DLES            I have reviewed all pertinent labs within the past 24 hours.    Estimated Creatinine Clearance: 99.8 mL/min (based on SCr of 1.2 mg/dL).    Diagnostic Results:  I have reviewed and interpreted all pertinent imaging results/findings within the past 24 hours.    Assessment and Plan:     Patient is a 36 yo black male with stage d HFrEF, NICM, ? Familial CM (Father had LVAD and subsequent heart transplant), h/o PSA, DM2 on insulin who is s/p DT-HM3 implantation 6/23/2022, post op course complicated by early RV failure requiring RVAD with ProTek Duo after admitted with ADHF/cardiogenic shock on home milrinone requiring IABP. He underwent RVAD removal and chest closure 6/30/2022.  He also completed a course of IV Abx for staph epi. He was weaned off  but he had to restarted due to RVF. He was eventually transitioned to milrinone (secondary to  shortage) now on 0.25 mcg/kg/min.    He presented to an outside ED with complaints of fever (tmax 101 F), myalgias, and headache since Tuesday,  He also reported drainage from his DLES over the past 2 days.  He has a cough productive of a small amount of clear sputum.  No N/V or nasal congestion.  No VAD alarms.  Spouse also had fever and flu like symptoms over the past week. Otherwise states he has no other issues and thought that he had the flu.      Upon presentation to the ER, vitals stable, satting well on room air.  Labs show a sCr of 1.65 (baseline Cr 1.3), Leukocytosis to 13K, initial lactate normal at 1.9, COVID-19 PCR positive, Influenza PCR negative for A & B, CXR unremarkable.  CT abdomen was obtained to evaluate DLES drainage, shows mild stranding along distal drive line but otherwise unremarkable, no fluid collection.  He was started on vancomycin at the  outside ED and was [place] [facility] [place] infection in addition to possible LVAD DLES infection.      Home Medications:   Valsartan 40mg BID  Aldactone 25mg daily  Lasix 80mg PO daily (takes 40 BID due to cramping)  Milrinone 0.25 mcg/kg/min      * Left ventricular assist device (LVAD) complication, initial encounter  - Concern for DLES in the setting of increased drainage from the driveline site and CT scan.   - As per ID, continue vancomycin and zosyn.   - Cultures negative as of 48hrs.     COVID-19  Patient tested positive for COVID on 6/2/2023  Date of symptom onset: 5/30/22  O2 requirement: room air  Abx: vancomycin and zosyn (started on 6/3/2023)  Trending inflammatory markers  Continue remdesivir (6/3/2023 - 6/7/2023)      LVAD (left ventricular assist device) present  Procedure: Device Interrogation Including analysis of device parameters  Current Settings: Ventricular Assist Device  Review of device function is stable  Continue coumadin at home dose for INR goal of 2.0-3.0, monitor daily in the AM      TXP LVAD INTERROGATIONS 6/5/2023 6/5/2023 6/5/2023 6/4/2023 6/4/2023 6/4/2023 6/4/2023   Type HeartMate3 HeartMate3 HeartMate3 HeartMate3 HeartMate3 HeartMate3 HeartMate3   Flow 3.6 3.9 4.2 4.3 4.5 4.5 4.5   Speed 5100 5100 5100 5100 5100 5100 5150   PI 3.9 4.5 4.5 4.4 3.5 3.7 3.6   Power (Serna) 3.6 3.7 3.6 3.7 3.6 3.7 3.8   LSL 4700 4700 4700 4700 4700 4700 4750   Low Flow Alarm - - - - - - -   High Power Alarm - - - - - - -   Pulsatility Intermittent pulse Intermittent pulse Intermittent pulse Intermittent pulse Intermittent pulse Intermittent pulse Intermittent pulse       Seizure-like activity  Avoiding cefepime given hx of seizure with possibility of lowering seizure threshold  Continue keppra 1,000mg BID    Type 2 diabetes mellitus with hyperglycemia  - Management as per Endocrinology.     Chronic combined systolic and diastolic heart failure  - Last Echo (4/19/2023): HM3 9120. IV septum  midline. AV does not open. EF 10%. LV severely enlarged w/ severely decreased systolic function. Indeterminate LV diastolic dysfunction. Severe RV enlargment w/ mod severely reduced RV systolic function. Biatrial enlargment. Mod to severe TR. Mild to mod MR. PA sys pressure 44mmHg.   - Last RHC (10/31/2022): RA: 21/ 20/ 18, RV: 35/ 7/ 22, PA: 35/ 19/ 24, PWP: 22/ 22/ 21. CO 3.55  by Naomi. CI 1.6.  - Home GDMT: Spironolactone 25mg daily and Valsartan 40mg BID  - Home diuretic regimen: Lasix 40mg bid  - Euvolemic on exam today. Continue home GDMT and diuretic regimen.   - Ionotropes: Continue chronic mil 0.25  - Strict I&Os and daily weights.   - Maintain K>4 and Mg>2      Driveline cultures came back positive for staph aureus. As per ID, discontinue vanc and zosyn. Start ancef. Follow up repeat blood cultures.     Chantal Herndon MD  Heart Transplant  [facility] - Cardiology Stepdown

## 2023-06-05 NOTE — PROGRESS NOTES
Therapy with vancomycin complete and/or consult discontinued by provider.  Pharmacy will sign off, please re-consult as needed.    Angela Birmingham, PharmD, BCPS  Cardiology Clinical Pharmacy Specialist  Ext. [phone]

## 2023-06-05 NOTE — PROGRESS NOTES
Study Title:  Antiplatelet Removal and HemocompatIbility EventS with the HeartMate 3 Pump   Protocol: CRD_971  IRB #/Approval Date: [id number]  Sponsor: [employer]  : Dr. Luis F Paige     Mr. Queen was admitted over the weekend without his GASTON treatment arm medication. I requisitioned a new bottle of MELARA from the investigational pharmacy and delivered it to the CSU charge nurse, Jovan, to ensure continuing med compliance.

## 2023-06-05 NOTE — SUBJECTIVE & OBJECTIVE
Interval History: No fevers documented overnight.       Review of Systems   Constitutional:  Negative for chills and fever.   Respiratory:  Negative for cough and shortness of breath.    Gastrointestinal:  Negative for abdominal pain, diarrhea, nausea and vomiting.   Objective:     Vital Signs (Most Recent):  Temp: 96.8 °F (36 °C) (06/05/23 1134)  Pulse: 93 (06/05/23 1136)  Resp: 18 (06/05/23 1134)  BP: 105/60 (06/05/23 1134)  SpO2: 99 % (06/05/23 1134) Vital Signs (24h Range):  Temp:  [96.7 °F (35.9 °C)-98.5 °F (36.9 °C)] 96.8 °F (36 °C)  Pulse:  [] 93  Resp:  [16-19] 18  SpO2:  [93 %-100 %] 99 %  BP: ()/(0-64) 105/60     Weight: 99.4 kg (219 lb 2.2 oz)  Body mass index is 27.39 kg/m².    Estimated Creatinine Clearance: 99.8 mL/min (based on SCr of 1.2 mg/dL).     Physical Exam  Constitutional:       General: He is not in acute distress.     Appearance: He is not toxic-appearing.   HENT:      Head: Normocephalic and atraumatic.   Eyes:      General: No scleral icterus.        Right eye: No discharge.         Left eye: No discharge.   Pulmonary:      Effort: Pulmonary effort is normal. No respiratory distress.   Abdominal:      General: There is no distension.      Palpations: Abdomen is soft.      Tenderness: There is no abdominal tenderness.      Comments: DLES dressed   Musculoskeletal:      Right lower leg: No edema.      Left lower leg: No edema.   Skin:     General: Skin is warm and dry.      Comments: R picc   Neurological:      Mental Status: He is alert.        Significant Labs:   Microbiology Results (last 7 days)       Procedure Component Value Units Date/Time    Aerobic culture [[id number]]  (Abnormal)  (Susceptibility) Collected: 06/03/23 0135    Order Status: Completed Specimen: Skin from Abdomen Updated: 06/05/23 1113     Aerobic Bacterial Culture STAPHYLOCOCCUS AUREUS  Moderate      Narrative:      LVAD DLES    Blood culture [[id number]] Collected: 06/03/23 0040    Order Status: Completed  Specimen: Blood Updated: 06/05/23 0612     Blood Culture, Routine No Growth to date      No Growth to date      No Growth to date    Blood culture [[id number]] Collected: 06/03/23 0040    Order Status: Completed Specimen: Blood Updated: 06/05/23 0612     Blood Culture, Routine No Growth to date      No Growth to date      No Growth to date    Culture, Anaerobe [[id number]] Collected: 06/03/23 0135    Order Status: Completed Specimen: Skin from Abdomen Updated: 06/04/23 1009     Anaerobic Culture Culture in progress    Narrative:      DLES            Significant Imaging: I have reviewed all pertinent imaging results/findings within the past 24 hours.

## 2023-06-05 NOTE — PROGRESS NOTES
Admit Note     Heart Transplant  spoke with patient by phone to assess needs due to pt's current isolation requirements.  Pt is familiar with the role of SW from past hospital admissions. Pt was last discharged on 4/6/23.  Pt agreed to complete assessment by phone.      Patient is a 38 y.o. single male, admitted for positive for COVID, DLES drainage, seizure, type 2 diabetes, LVAD and acute on chronic combined systolic and diastolic heart failure.  Pt received his LVAD on 6/23/22. The pt's significant other does the dressing changes.        Patient admitted to [facility] on 6/2/2023 .  At this time, patient presents by phone as alert and oriented x 4, calm, and communicative.  At this time, patients caregiver is not in attendance.     Household/Family Systems     Patient resides with patient's mother, significant other, and three children ages 3,5, and 17 (recent graduate) at       [address] [place] [address].    2 hours from [facility]    Support system includes significant other, mother and children. The pt has seven children, two with the pt's significant other. The pt's other children live with their mothers. Pt reports family/mother care for children in the home when the pt and significant other are not in attendance.      Patients primary caregiver is self with support from significant other when indicated.    Pt's primary cell phone is : [phone]  Pt's back up cell phone is: [phone]    Emergency contacts:   Niharika Wright (significant other, will start work soon and drives) [phone]  Malou Dumont (mother) has a new phone and pt can't remember the phone number.         During admission, patient's caregiver plans to stay at home.      Confirmed patient and patients caregivers do have access to reliable transportation. Pt reports bother he and his significant other drive.     Cognitive Status/Learning     Patient reports reading ability as college and states patient does not have  difficulty with reading, writing, hearing, comprehension, learning, and memory.  Pt reports he continues to have the same difficulty with his eyesight.  Pt reports his eyesight issues do not effect his ability to drive.       Patient reports patient learns best by multiple methods.       Needed: No.   Highest education level: Attended College/Technical School    Vocation/Disability   .  Working for Income: No  If no, reason not working: medical   Patient used to work as a CNA.  Significant other is currently not working, but hopefully will start a new job soon.   Pt's mother receives monthly income and is able to assist pt financially.   Pt reports he has applied for disability and and a  is assisting with this process. Pt reports The Disability Office reports it may take an additional six months to process pt's information.   Pt reports no difficulty at this time covering the cost of monthly bills.     Adherence     Patient reports a high level of adherence to patients health care regimen. Pt reports he is a vegan.   Adherence counseling and education provided. Patient verbalizes understanding.    Substance Use    Patient reports the following substance usage.    Tobacco: none, patient denies any use.  Alcohol: none, patient denies any use.  Illicit Drugs/Non-prescribed Medications: no current use.   Pt does have a history of drug use. Pt stopped using Marijuana on 11/2021, cocaine on 7/2021 and ecstasy on 9/2020.    Patient states clear understanding of the potential impact of substance use.  Substance abstinence/cessation counseling, education and resources provided and reviewed.     Services Utilizing/ADLS    Infusion Service: Prior to admission, patient utilizing? yes [facility] for home Milrinone.  Pt reports his significant other Page does the IV dressing changes.   Pt would like to use the same IV company when discharged. [phone], fax [phone]    Home Health: Prior  to admission, patient utilizing? no    DME: Prior to admission, no    Pulmonary/Cardiac Rehab: Prior to admission, no    Dialysis:  Prior to admission, no    Transplant Specialty Pharmacy:  Prior to admission, no.    Prior to admission, patient reports patient was independent with ADLS and was driving.  Patient reports patient is not able to care for self at this time due to compromised medical condition (as documented in medical record) and physical weakness..  Patient indicates a willingness to care for self once medically cleared to do so.    Insurance/Medications    Insured by   Payer/Plan Subscr  Sex Relation Sub. Ins. ID Effective Group Num   1. MEDICAID - HE* JOSE J DAMICO 1985 Male Self [id number]* 3/1/20 [id number]                                   [address] [address]      Primary Insurance (for UNOS reporting): Public Insurance - Medicaid  Secondary Insurance (for UNOS reporting): None    Patient reports patient is able to obtain and afford medications at this time and at time of discharge. Pt reports his monthly medications are running about $8.00    Living Will/Healthcare Power of     Patient states patient has a LW and/or HCPA.  Pt reports his mother is his HCPA.  Worker explained need for phone number.    provided education regarding LW and HCPA and the completion of forms.    Coping/Mental Health    Patient is coping adequately with the aid of  family members.     Patient indicates mental ailyn difficulties.  Pt reports a history of depression, anxiety and anger and is taking Buspar. Pt reports he continues to be followed by Palliative Care in [place].  Pt is not participating in out pt counseling.  Pt reports no needs or concerns at this time and hopes to be able to go home soon.        Discharge Planning    At time of discharge, patient plans to return to patient's home under the care of self, significant other and mother.  Patients significant other will transport patient.   Per rounds today, expected discharge date is possibly in the next day or two per pt . Patient and patients caregiver  verbalize understanding and are involved in treatment planning and discharge process.    Additional Concerns    Patient is being followed for needs, education, resources, information, emotional support, supportive counseling, and for supportive and skilled discharge plan of care.  providing ongoing psychosocial support, education, resources and d/c planning as needed.  SW remains available. Patient verbalizes understanding and agreement with information reviewed, social work availability, and how to access available resources as needed.

## 2023-06-05 NOTE — ASSESSMENT & PLAN NOTE
Procedure: Device Interrogation Including analysis of device parameters  Current Settings: Ventricular Assist Device  Review of device function is stable  Continue coumadin at home dose for INR goal of 2.0-3.0, monitor daily in the AM      TXP LVAD INTERROGATIONS 6/5/2023 6/5/2023 6/5/2023 6/4/2023 6/4/2023 6/4/2023 6/4/2023   Type HeartMate3 HeartMate3 HeartMate3 HeartMate3 HeartMate3 HeartMate3 HeartMate3   Flow 3.6 3.9 4.2 4.3 4.5 4.5 4.5   Speed 5100 5100 5100 5100 5100 5100 5150   PI 3.9 4.5 4.5 4.4 3.5 3.7 3.6   Power (Serna) 3.6 3.7 3.6 3.7 3.6 3.7 3.8   LSL 4700 4700 4700 4700 4700 4700 4750   Low Flow Alarm - - - - - - -   High Power Alarm - - - - - - -   Pulsatility Intermittent pulse Intermittent pulse Intermittent pulse Intermittent pulse Intermittent pulse Intermittent pulse Intermittent pulse

## 2023-06-05 NOTE — SUBJECTIVE & OBJECTIVE
Interval History: NAEON. No acute complaints.     Continuous Infusions:   milrinone 20mg/100ml D5W (200mcg/ml) 0.25 mcg/kg/min (06/05/23 0724)     Scheduled Meds:   albuterol  2 puff Inhalation Q6H    ascorbic acid (vitamin C)  500 mg Oral BID    busPIRone  7.5 mg Oral Daily    famotidine  40 mg Oral Daily    furosemide  40 mg Oral BID    insulin aspart U-100  12 Units Subcutaneous TIDWM    insulin detemir U-100 (Levemir)  17 Units Subcutaneous BID    INV ASPIRIN 100MG/PLACEBO  100 mg Oral Daily    levETIRAcetam  1,000 mg Oral BID    magnesium sulfate IVPB  2 g Intravenous Once    mirtazapine  15 mg Oral Nightly    multivitamin  1 tablet Oral Daily    piperacillin-tazobactam (ZOSYN) IVPB  4.5 g Intravenous Q8H    potassium chloride  40 mEq Oral Once    remdesivir infusion  100 mg Intravenous Daily    spironolactone  25 mg Oral Daily    valsartan  40 mg Oral BID    vancomycin (VANCOCIN) IVPB  1,250 mg Intravenous Q12H    warfarin  2 mg Oral Daily     PRN Meds:acetaminophen, cyclobenzaprine, dextrose 10%, dextrose 10%, dextrose 10%, dextrose 10%, dextrose, dextrose, glucagon (human recombinant), insulin aspart U-100, sodium chloride 0.9%, Pharmacy to dose Vancomycin consult **AND** vancomycin - pharmacy to dose    Review of patient's allergies indicates:   Allergen Reactions    Aspirin Other (See Comments)     Mr. Thacker is enrolled in Dr. Paige's Alon Trial and cannot have any aspirin and/or aspirin-containing products. DO NOT cancel any orders for INV Aspirin 100 mg/Placebo. If you have questions, please contact Isabel @ [phone], [phone],[email or web address], secure chat or MS Teams message.    Bumex [bumetanide] Hives    Lactose Diarrhea     Other reaction(s): Abdominal distension, gaseous    Torsemide Hives     Objective:     Vital Signs (Most Recent):  Temp: 97.2 °F (36.2 °C) (06/05/23 0729)  Pulse: 80 (06/05/23 0729)  Resp: 16 (06/05/23 0729)  BP: (!) 87/59 (06/05/23 0730)  SpO2: 97 % (06/05/23 0729)  Vital Signs (24h Range):  Temp:  [96.7 °F (35.9 °C)-98.5 °F (36.9 °C)] 97.2 °F (36.2 °C)  Pulse:  [] 80  Resp:  [16-19] 16  SpO2:  [93 %-100 %] 97 %  BP: (62-91)/(0-64) 87/59     Patient Vitals for the past 72 hrs (Last 3 readings):   Weight   06/02/23 2327 99.4 kg (219 lb 2.2 oz)       Body mass index is 27.39 kg/m².      Intake/Output Summary (Last 24 hours) at 6/5/2023 0832  Last data filed at 6/5/2023 0732  Gross per 24 hour   Intake 600 ml   Output 3450 ml   Net -2850 ml                Physical Exam  Constitutional:       Appearance: Normal appearance.   HENT:      Head: Normocephalic and atraumatic.      Mouth/Throat:      Mouth: Mucous membranes are moist.   Neck:      Comments: No elevation in JVP appreciated  Cardiovascular:      Rate and Rhythm: Normal rate.      Comments: Normal VAD hum can be heard  Pulmonary:      Effort: Pulmonary effort is normal. No respiratory distress.      Breath sounds: Normal breath sounds. No wheezing or rales.   Abdominal:      General: Abdomen is flat.      Tenderness: There is no abdominal tenderness. There is no guarding.   Musculoskeletal:         General: No swelling.      Cervical back: Normal range of motion and neck supple.   Skin:     General: Skin is warm.      Capillary Refill: Capillary refill takes 2 to 3 seconds.      Comments: R sided PICC line in basilic vein   Neurological:      General: No focal deficit present.      Mental Status: He is alert and oriented to person, place, and time.   Psychiatric:         Mood and Affect: Mood normal.         Behavior: Behavior normal.          Significant Labs:  CBC:  Recent Labs   Lab 06/03/23  0532 06/04/23  0513 06/05/23  0418   WBC 12.69 14.22* 16.96*   RBC 4.92 5.37 5.12   HGB 12.1* 13.2* 12.6*   HCT 39.4* 40.9 38.8*    250 249   MCV 80* 76* 76*   MCH 24.6* 24.6* 24.6*   MCHC 30.7* 32.3 32.5       BNP:  Recent Labs   Lab 06/05/23  0418   BNP 52     CMP:  Recent Labs   Lab 06/02/23  1652 06/03/23  0532  06/04/23  0513 06/04/23  1638 06/05/23  0418 06/05/23  0419   GLU  --    < > 247* 200*  --  210*   CALCIUM 8.9   < > 9.5 9.4  --  9.3   ALBUMIN 4.0  --   --   --  3.1* 3.1*   PROT  --   --   --   --  7.4 7.5   *   < > 133* 137  --  135*   K 4.1   < > 4.6 4.3  --  3.8   CO2 21*   < > 21* 25  --  28   CL  --    < > 102 102  --  99   BUN 7.2*   < > 11 13  --  11   CREATININE 1.65*   < > 1.4 1.3  --  1.2   ALKPHOS 101  --   --   --  84 82   ALT 10  --   --   --  26 24   AST 22  --   --   --  35 37   BILITOT 1.0  --   --   --  0.5 0.5    < > = values in this interval not displayed.        Coagulation:   Recent Labs   Lab 06/03/23  0532 06/04/23 0513 06/05/23 0418   INR 2.0* 2.9* 2.6*   APTT 37.5* 39.6* 34.7*       LDH:  Recent Labs   Lab 06/03/23  0532 06/04/23 0513 06/05/23 0418    350* 247       Microbiology:  Microbiology Results (last 7 days)       Procedure Component Value Units Date/Time    Blood culture [[id number]] Collected: 06/03/23 0040    Order Status: Completed Specimen: Blood Updated: 06/05/23 0612     Blood Culture, Routine No Growth to date      No Growth to date      No Growth to date    Blood culture [[id number]] Collected: 06/03/23 0040    Order Status: Completed Specimen: Blood Updated: 06/05/23 0612     Blood Culture, Routine No Growth to date      No Growth to date      No Growth to date    Culture, Anaerobe [[id number]] Collected: 06/03/23 0135    Order Status: Completed Specimen: Skin from Abdomen Updated: 06/04/23 1009     Anaerobic Culture Culture in progress    Narrative:      DLES    Aerobic culture [[id number]] Collected: 06/03/23 0135    Order Status: Completed Specimen: Skin from Abdomen Updated: 06/04/23 0718     Aerobic Bacterial Culture Further report to follow    Narrative:      LVAD DLES            I have reviewed all pertinent labs within the past 24 hours.    Estimated Creatinine Clearance: 99.8 mL/min (based on SCr of 1.2 mg/dL).    Diagnostic Results:  I have reviewed  and interpreted all pertinent imaging results/findings within the past 24 hours.

## 2023-06-05 NOTE — ASSESSMENT & PLAN NOTE
DLES drainage noted with cultures with MSSA. I independently reviewed pt's lab work and images -leukocytosis noted, COVID PCR positive, and CT abdo without focal fluid collection. Pt reports feeling better, no diarrhea, reports improvement in drainage, and denies cough/sob.     Recommendations:  -stop vanc and zosyn, de-escalate to ancef 2g q8hr  -follow up DLES cultures  -monitor renal function while on abx therapy

## 2023-06-05 NOTE — ASSESSMENT & PLAN NOTE
- Concern for DLES in the setting of increased drainage from the driveline site and CT scan.   - As per ID, continue vancomycin and zosyn.   - Cultures negative as of 48hrs.

## 2023-06-05 NOTE — PROGRESS NOTES
[facility]  Infectious Disease  Progress Note    Patient Name: Kevan Queen  MRN: [id number]  Admission Date: 6/2/2023  Length of Stay: 3 days  Attending Physician: Angela Yang DO  Primary Care Provider: ORALIA Cline    Isolation Status: Airborne and Contact and Droplet  Assessment/Plan:      Cardiac/Vascular  LVAD (left ventricular assist device) present  DLES drainage noted with cultures with MSSA. I independently reviewed pt's lab work and images -leukocytosis noted, COVID PCR positive, and CT abdo without focal fluid collection. Pt reports feeling better, no diarrhea, reports improvement in drainage, and denies cough/sob. Also received a dose of decadron which could contribute.     Recommendations:  -stop vanc and zosyn, de-escalate to ancef 2g q8hr (ordered)  -follow up DLES cultures  -monitor renal function while on abx therapy   -blcx repeated (ordered)    ID  COVID-19  +sick contact and pt was found to have COVID positive PCR. I independently reviewed his CXR - no focal consolidation. Not hypoxic.     Recommendations:  -continue rem per hospital protocol given high risk  -covid precautions per infection control  -monitor for hepatotoxicity with daily CMP while on remdesivir            Thank you for your consult. I will follow-up with patient. Please contact us if you have any additional questions. Above d/w primary team.         50 minutes of total time spent on the encounter, which includes face to face time and non-face to face time preparing to see the patient (eg, review of tests), obtaining and/or reviewing separately obtained history, documenting clinical information in the electronic or other health record, independently interpreting results (not separately reported) and communicating results to the patient/family/caregiver, or care coordination (not separately reported).       Laura Baldwin MD  Infectious Disease  [facility]    Subjective:      Principal Problem:Left ventricular assist device (LVAD) complication, initial encounter    HPI: 36 yo male with HM3 6/23/22 c/b early RV failure requiring RVAD (removal/chest closure 6/30/22), CONS bacteremia s/p tx, on IV milrinone at home admitted to [facility] [place] for fever, found to have COVID and DLES drainage. ID consulted for abx recs. Pt reported several day hx of increased DLES drainage with fevers, dry cough, and exposure to sick contact (spouse with flu like symptoms and nephew). Work up notable for COVID PCR positive and CT abdo without evidence of fluid collection. He is currently on vanc, zosyn, and remdesivir. Not on supplemental O2. Denies issues with his chronic R picc.         Interval History: No fevers documented overnight.       Review of Systems   Constitutional:  Negative for chills and fever.   Respiratory:  Negative for cough and shortness of breath.    Gastrointestinal:  Negative for abdominal pain, diarrhea, nausea and vomiting.   Objective:     Vital Signs (Most Recent):  Temp: 96.8 °F (36 °C) (06/05/23 1134)  Pulse: 93 (06/05/23 1136)  Resp: 18 (06/05/23 1134)  BP: 105/60 (06/05/23 1134)  SpO2: 99 % (06/05/23 1134) Vital Signs (24h Range):  Temp:  [96.7 °F (35.9 °C)-98.5 °F (36.9 °C)] 96.8 °F (36 °C)  Pulse:  [] 93  Resp:  [16-19] 18  SpO2:  [93 %-100 %] 99 %  BP: ()/(0-64) 105/60     Weight: 99.4 kg (219 lb 2.2 oz)  Body mass index is 27.39 kg/m².    Estimated Creatinine Clearance: 99.8 mL/min (based on SCr of 1.2 mg/dL).     Physical Exam  Constitutional:       General: He is not in acute distress.     Appearance: He is not toxic-appearing.   HENT:      Head: Normocephalic and atraumatic.   Eyes:      General: No scleral icterus.        Right eye: No discharge.         Left eye: No discharge.   Pulmonary:      Effort: Pulmonary effort is normal. No respiratory distress.   Abdominal:      General: There is no distension.      Palpations: Abdomen is soft.      Tenderness: There is  no abdominal tenderness.      Comments: DLES dressed   Musculoskeletal:      Right lower leg: No edema.      Left lower leg: No edema.   Skin:     General: Skin is warm and dry.      Comments: R picc   Neurological:      Mental Status: He is alert.        Significant Labs:   Microbiology Results (last 7 days)       Procedure Component Value Units Date/Time    Aerobic culture [[id number]]  (Abnormal)  (Susceptibility) Collected: 06/03/23 0135    Order Status: Completed Specimen: Skin from Abdomen Updated: 06/05/23 1113     Aerobic Bacterial Culture STAPHYLOCOCCUS AUREUS  Moderate      Narrative:      LVAD DLES    Blood culture [[id number]] Collected: 06/03/23 0040    Order Status: Completed Specimen: Blood Updated: 06/05/23 0612     Blood Culture, Routine No Growth to date      No Growth to date      No Growth to date    Blood culture [[id number]] Collected: 06/03/23 0040    Order Status: Completed Specimen: Blood Updated: 06/05/23 0612     Blood Culture, Routine No Growth to date      No Growth to date      No Growth to date    Culture, Anaerobe [[id number]] Collected: 06/03/23 0135    Order Status: Completed Specimen: Skin from Abdomen Updated: 06/04/23 1009     Anaerobic Culture Culture in progress    Narrative:      DLES            Significant Imaging: I have reviewed all pertinent imaging results/findings within the past 24 hours.

## 2023-06-05 NOTE — NURSING
"LVAD dressing change completed using sterile technique with kit. DLES is a "1" with small amount of serous drainage noted on the drain sponge. Tolerated without any complication. No redness, or tenderness noted.    "

## 2023-06-06 VITALS
OXYGEN SATURATION: 96 % | SYSTOLIC BLOOD PRESSURE: 84 MMHG | TEMPERATURE: 98 F | RESPIRATION RATE: 18 BRPM | BODY MASS INDEX: 27.39 KG/M2 | HEART RATE: 90 BPM | WEIGHT: 219.13 LBS

## 2023-06-06 LAB
ALBUMIN SERPL BCP-MCNC: 3.2 G/DL (ref 3.5–5.2)
ALP SERPL-CCNC: 85 U/L (ref 55–135)
ALT SERPL W/O P-5'-P-CCNC: 26 U/L (ref 10–44)
ANION GAP SERPL CALC-SCNC: 8 MMOL/L (ref 8–16)
APTT PPP: 32.4 SEC (ref 21–32)
AST SERPL-CCNC: 40 U/L (ref 10–40)
BASOPHILS # BLD AUTO: 0.05 K/UL (ref 0–0.2)
BASOPHILS NFR BLD: 0.5 % (ref 0–1.9)
BILIRUB SERPL-MCNC: 0.5 MG/DL (ref 0.1–1)
BUN SERPL-MCNC: 9 MG/DL (ref 6–20)
CALCIUM SERPL-MCNC: 9.1 MG/DL (ref 8.7–10.5)
CHLORIDE SERPL-SCNC: 103 MMOL/L (ref 95–110)
CO2 SERPL-SCNC: 26 MMOL/L (ref 23–29)
CREAT SERPL-MCNC: 1.2 MG/DL (ref 0.5–1.4)
DIFFERENTIAL METHOD: ABNORMAL
EOSINOPHIL # BLD AUTO: 0.1 K/UL (ref 0–0.5)
EOSINOPHIL NFR BLD: 0.6 % (ref 0–8)
ERYTHROCYTE [DISTWIDTH] IN BLOOD BY AUTOMATED COUNT: 22.3 % (ref 11.5–14.5)
EST. GFR  (NO RACE VARIABLE): >60 ML/MIN/1.73 M^2
GLUCOSE SERPL-MCNC: 121 MG/DL (ref 70–110)
HCT VFR BLD AUTO: 43.2 % (ref 40–54)
HGB BLD-MCNC: 13.6 G/DL (ref 14–18)
IMM GRANULOCYTES # BLD AUTO: 0.04 K/UL (ref 0–0.04)
IMM GRANULOCYTES NFR BLD AUTO: 0.4 % (ref 0–0.5)
INR PPP: 2.2 (ref 0.8–1.2)
LDH SERPL L TO P-CCNC: 261 U/L (ref 110–260)
LYMPHOCYTES # BLD AUTO: 3.7 K/UL (ref 1–4.8)
LYMPHOCYTES NFR BLD: 33.3 % (ref 18–48)
MAGNESIUM SERPL-MCNC: 1.8 MG/DL (ref 1.6–2.6)
MCH RBC QN AUTO: 24.6 PG (ref 27–31)
MCHC RBC AUTO-ENTMCNC: 31.5 G/DL (ref 32–36)
MCV RBC AUTO: 78 FL (ref 82–98)
MONOCYTES # BLD AUTO: 0.7 K/UL (ref 0.3–1)
MONOCYTES NFR BLD: 6.2 % (ref 4–15)
NEUTROPHILS # BLD AUTO: 6.5 K/UL (ref 1.8–7.7)
NEUTROPHILS NFR BLD: 59 % (ref 38–73)
NRBC BLD-RTO: 0 /100 WBC
PLATELET # BLD AUTO: 302 K/UL (ref 150–450)
PMV BLD AUTO: 9.9 FL (ref 9.2–12.9)
POCT GLUCOSE: 117 MG/DL (ref 70–110)
POCT GLUCOSE: 118 MG/DL (ref 70–110)
POCT GLUCOSE: 165 MG/DL (ref 70–110)
POCT GLUCOSE: 195 MG/DL (ref 70–110)
POCT GLUCOSE: 93 MG/DL (ref 70–110)
POTASSIUM SERPL-SCNC: 4.3 MMOL/L (ref 3.5–5.1)
PROT SERPL-MCNC: 7.5 G/DL (ref 6–8.4)
PROTHROMBIN TIME: 22.1 SEC (ref 9–12.5)
RBC # BLD AUTO: 5.53 M/UL (ref 4.6–6.2)
SODIUM SERPL-SCNC: 137 MMOL/L (ref 136–145)
WBC # BLD AUTO: 10.96 K/UL (ref 3.9–12.7)

## 2023-06-06 PROCEDURE — 94761 N-INVAS EAR/PLS OXIMETRY MLT: CPT

## 2023-06-06 PROCEDURE — 25000003 PHARM REV CODE 250: Performed by: INTERNAL MEDICINE

## 2023-06-06 PROCEDURE — 25000003 PHARM REV CODE 250: Performed by: STUDENT IN AN ORGANIZED HEALTH CARE EDUCATION/TRAINING PROGRAM

## 2023-06-06 PROCEDURE — 99233 PR SUBSEQUENT HOSPITAL CARE,LEVL III: ICD-10-PCS | Mod: ,,, | Performed by: STUDENT IN AN ORGANIZED HEALTH CARE EDUCATION/TRAINING PROGRAM

## 2023-06-06 PROCEDURE — 99233 SBSQ HOSP IP/OBS HIGH 50: CPT | Mod: ,,, | Performed by: STUDENT IN AN ORGANIZED HEALTH CARE EDUCATION/TRAINING PROGRAM

## 2023-06-06 PROCEDURE — 83615 LACTATE (LD) (LDH) ENZYME: CPT | Performed by: STUDENT IN AN ORGANIZED HEALTH CARE EDUCATION/TRAINING PROGRAM

## 2023-06-06 PROCEDURE — 36415 COLL VENOUS BLD VENIPUNCTURE: CPT | Performed by: STUDENT IN AN ORGANIZED HEALTH CARE EDUCATION/TRAINING PROGRAM

## 2023-06-06 PROCEDURE — 94640 AIRWAY INHALATION TREATMENT: CPT

## 2023-06-06 PROCEDURE — 80053 COMPREHEN METABOLIC PANEL: CPT | Performed by: STUDENT IN AN ORGANIZED HEALTH CARE EDUCATION/TRAINING PROGRAM

## 2023-06-06 PROCEDURE — 63600175 PHARM REV CODE 636 W HCPCS: Performed by: STUDENT IN AN ORGANIZED HEALTH CARE EDUCATION/TRAINING PROGRAM

## 2023-06-06 PROCEDURE — 83735 ASSAY OF MAGNESIUM: CPT | Performed by: STUDENT IN AN ORGANIZED HEALTH CARE EDUCATION/TRAINING PROGRAM

## 2023-06-06 PROCEDURE — 85730 THROMBOPLASTIN TIME PARTIAL: CPT | Performed by: STUDENT IN AN ORGANIZED HEALTH CARE EDUCATION/TRAINING PROGRAM

## 2023-06-06 PROCEDURE — 85025 COMPLETE CBC W/AUTO DIFF WBC: CPT | Performed by: STUDENT IN AN ORGANIZED HEALTH CARE EDUCATION/TRAINING PROGRAM

## 2023-06-06 PROCEDURE — 93750 PR INTERROGATE VENT ASSIST DEV, IN PERSON, W PHYSICIAN ANALYSIS: ICD-10-PCS | Mod: ,,, | Performed by: INTERNAL MEDICINE

## 2023-06-06 PROCEDURE — 99233 PR SUBSEQUENT HOSPITAL CARE,LEVL III: ICD-10-PCS | Mod: ,,, | Performed by: INTERNAL MEDICINE

## 2023-06-06 PROCEDURE — 85610 PROTHROMBIN TIME: CPT | Performed by: STUDENT IN AN ORGANIZED HEALTH CARE EDUCATION/TRAINING PROGRAM

## 2023-06-06 PROCEDURE — 93750 INTERROGATION VAD IN PERSON: CPT | Mod: ,,, | Performed by: INTERNAL MEDICINE

## 2023-06-06 PROCEDURE — 99233 SBSQ HOSP IP/OBS HIGH 50: CPT | Mod: ,,, | Performed by: INTERNAL MEDICINE

## 2023-06-06 PROCEDURE — 27000248 HC VAD-ADDITIONAL DAY

## 2023-06-06 RX ORDER — DOXYCYCLINE 100 MG/1
100 CAPSULE ORAL 2 TIMES DAILY
Qty: 28 CAPSULE | Refills: 0 | Status: ON HOLD | OUTPATIENT
Start: 2023-06-06 | End: 2023-08-08 | Stop reason: HOSPADM

## 2023-06-06 RX ORDER — FUROSEMIDE 40 MG/1
40 TABLET ORAL 2 TIMES DAILY
Qty: 60 TABLET | Refills: 11 | Status: CANCELLED | OUTPATIENT
Start: 2023-06-06 | End: 2024-06-05

## 2023-06-06 RX ORDER — WARFARIN 3 MG/1
TABLET ORAL
Qty: 38 TABLET | Refills: 11 | Status: ON HOLD
Start: 2023-06-06 | End: 2023-08-08 | Stop reason: HOSPADM

## 2023-06-06 RX ORDER — MAGNESIUM SULFATE HEPTAHYDRATE 40 MG/ML
2 INJECTION, SOLUTION INTRAVENOUS ONCE
Status: COMPLETED | OUTPATIENT
Start: 2023-06-06 | End: 2023-06-06

## 2023-06-06 RX ORDER — POTASSIUM CHLORIDE 20 MEQ/1
40 TABLET, EXTENDED RELEASE ORAL 2 TIMES DAILY
Qty: 180 TABLET | Refills: 11
Start: 2023-06-06 | End: 2023-06-12

## 2023-06-06 RX ADMIN — VALSARTAN 40 MG: 40 TABLET, FILM COATED ORAL at 08:06

## 2023-06-06 RX ADMIN — LEVETIRACETAM 1000 MG: 500 TABLET, FILM COATED ORAL at 09:06

## 2023-06-06 RX ADMIN — MAGNESIUM SULFATE 2 G: 2 INJECTION INTRAVENOUS at 10:06

## 2023-06-06 RX ADMIN — ALBUTEROL SULFATE 2 PUFF: 108 AEROSOL, METERED RESPIRATORY (INHALATION) at 12:06

## 2023-06-06 RX ADMIN — CEFAZOLIN 2 G: 2 INJECTION, POWDER, FOR SOLUTION INTRAMUSCULAR; INTRAVENOUS at 05:06

## 2023-06-06 RX ADMIN — FAMOTIDINE 40 MG: 20 TABLET, FILM COATED ORAL at 08:06

## 2023-06-06 RX ADMIN — Medication 100 MG: at 08:06

## 2023-06-06 RX ADMIN — LEVETIRACETAM 1000 MG: 500 TABLET, FILM COATED ORAL at 08:06

## 2023-06-06 RX ADMIN — INSULIN ASPART 12 UNITS: 100 INJECTION, SOLUTION INTRAVENOUS; SUBCUTANEOUS at 04:06

## 2023-06-06 RX ADMIN — BUSPIRONE HYDROCHLORIDE 7.5 MG: 5 TABLET ORAL at 08:06

## 2023-06-06 RX ADMIN — ALBUTEROL SULFATE 2 PUFF: 108 AEROSOL, METERED RESPIRATORY (INHALATION) at 07:06

## 2023-06-06 RX ADMIN — VALSARTAN 40 MG: 40 TABLET, FILM COATED ORAL at 09:06

## 2023-06-06 RX ADMIN — MILRINONE LACTATE IN DEXTROSE 0.25 MCG/KG/MIN: 200 INJECTION, SOLUTION INTRAVENOUS at 10:06

## 2023-06-06 RX ADMIN — SPIRONOLACTONE 25 MG: 25 TABLET, FILM COATED ORAL at 08:06

## 2023-06-06 RX ADMIN — OXYCODONE HYDROCHLORIDE AND ACETAMINOPHEN 500 MG: 500 TABLET ORAL at 09:06

## 2023-06-06 RX ADMIN — FUROSEMIDE 40 MG: 40 TABLET ORAL at 08:06

## 2023-06-06 RX ADMIN — FUROSEMIDE 40 MG: 40 TABLET ORAL at 05:06

## 2023-06-06 RX ADMIN — THERA TABS 1 TABLET: TAB at 08:06

## 2023-06-06 RX ADMIN — CEFAZOLIN 2 G: 2 INJECTION, POWDER, FOR SOLUTION INTRAMUSCULAR; INTRAVENOUS at 10:06

## 2023-06-06 RX ADMIN — WARFARIN SODIUM 4.5 MG: 2.5 TABLET ORAL at 04:06

## 2023-06-06 RX ADMIN — INSULIN ASPART 12 UNITS: 100 INJECTION, SOLUTION INTRAVENOUS; SUBCUTANEOUS at 11:06

## 2023-06-06 RX ADMIN — INSULIN ASPART 12 UNITS: 100 INJECTION, SOLUTION INTRAVENOUS; SUBCUTANEOUS at 08:06

## 2023-06-06 RX ADMIN — OXYCODONE HYDROCHLORIDE AND ACETAMINOPHEN 500 MG: 500 TABLET ORAL at 08:06

## 2023-06-06 RX ADMIN — CEFAZOLIN 2 G: 2 INJECTION, POWDER, FOR SOLUTION INTRAMUSCULAR; INTRAVENOUS at 12:06

## 2023-06-06 NOTE — PLAN OF CARE
Pt aox4, pain mgmt is controlled  Pt completed remdesivir   Afebrile.  LVAD drainage decreased significantly; now requiring once daily dressing change.   Speed maintain 6100  No alarms  All pt needs are met; on no apparent distress.         Problem: Adult Inpatient Plan of Care  Goal: Plan of Care Review  Outcome: Ongoing, Progressing  Goal: Patient-Specific Goal (Individualized)  Outcome: Ongoing, Progressing  Goal: Absence of Hospital-Acquired Illness or Injury  Outcome: Ongoing, Progressing  Goal: Optimal Comfort and Wellbeing  Outcome: Ongoing, Progressing  Goal: Readiness for Transition of Care  Outcome: Ongoing, Progressing     Problem: Diabetes Comorbidity  Goal: Blood Glucose Level Within Targeted Range  Outcome: Ongoing, Progressing     Problem: Infection  Goal: Absence of Infection Signs and Symptoms  Outcome: Ongoing, Progressing     Problem: Fall Injury Risk  Goal: Absence of Fall and Fall-Related Injury  Outcome: Ongoing, Progressing

## 2023-06-06 NOTE — PROGRESS NOTES
[facility] - [facility]  Heart Transplant  Progress Note    Patient Name: Kevan Queen  MRN: [id number]  Admission Date: 6/2/2023  Hospital Length of Stay: 4 days  Attending Physician: aDlia Crum MD  Primary Care Provider: ORALIA Cline  Principal Problem:Left ventricular assist device (LVAD) complication, initial encounter    Subjective:     Interval History: NAEON. No acute complaints.     Continuous Infusions:   milrinone 20mg/100ml D5W (200mcg/ml) 0.25 mcg/kg/min (06/05/23 1955)     Scheduled Meds:   albuterol  2 puff Inhalation Q6H    ascorbic acid (vitamin C)  500 mg Oral BID    busPIRone  7.5 mg Oral Daily    ceFAZolin (ANCEF) IVPB  2 g Intravenous Q8H    famotidine  40 mg Oral Daily    furosemide  40 mg Oral BID    insulin aspart U-100  12 Units Subcutaneous TIDWM    insulin detemir U-100 (Levemir)  17 Units Subcutaneous BID    INV ASPIRIN 100MG/PLACEBO  100 mg Oral Daily    levETIRAcetam  1,000 mg Oral BID    mirtazapine  15 mg Oral Nightly    multivitamin  1 tablet Oral Daily    polyethylene glycol  17 g Oral Daily    spironolactone  25 mg Oral Daily    valsartan  40 mg Oral BID    warfarin  4.5 mg Oral Daily     PRN Meds:acetaminophen, cyclobenzaprine, dextrose 10%, dextrose 10%, dextrose 10%, dextrose 10%, dextrose, dextrose, glucagon (human recombinant), insulin aspart U-100, senna, sodium chloride 0.9%    Review of patient's allergies indicates:   Allergen Reactions    Aspirin Other (See Comments)     Mr. Thacker is enrolled in Dr. Paige's Alon Trial and cannot have any aspirin and/or aspirin-containing products. DO NOT cancel any orders for INV Aspirin 100 mg/Placebo. If you have questions, please contact Isabel @ [phone], [phone],[email or web address], secure chat or MS Teams message.    Bumex [bumetanide] Hives    Lactose Diarrhea     Other reaction(s): Abdominal distension, gaseous    Torsemide Hives     Objective:     Vital Signs (Most Recent):  Temp:  97 °F (36.1 °C) (06/06/23 0421)  Pulse: 84 (06/06/23 0421)  Resp: 18 (06/06/23 0421)  BP: (!) 72/0 (06/06/23 0421)  SpO2: 95 % (06/06/23 0421) Vital Signs (24h Range):  Temp:  [96.8 °F (36 °C)-97.8 °F (36.6 °C)] 97 °F (36.1 °C)  Pulse:  [68-98] 84  Resp:  [16-18] 18  SpO2:  [94 %-100 %] 95 %  BP: ()/(0-63) 72/0     No data found.    Body mass index is 27.39 kg/m².      Intake/Output Summary (Last 24 hours) at 6/6/2023 0449  Last data filed at 6/6/2023 0055  Gross per 24 hour   Intake 1080 ml   Output 2075 ml   Net -995 ml                Physical Exam  Constitutional:       Appearance: Normal appearance.   HENT:      Head: Normocephalic and atraumatic.      Mouth/Throat:      Mouth: Mucous membranes are moist.   Neck:      Comments: JVP 8cm H20 at 45 degrees  Cardiovascular:      Rate and Rhythm: Normal rate.      Comments: Normal VAD hum can be heard  Pulmonary:      Effort: Pulmonary effort is normal. No respiratory distress.      Breath sounds: Normal breath sounds. No wheezing or rales.   Abdominal:      General: Abdomen is flat.      Tenderness: There is no abdominal tenderness. There is no guarding.   Musculoskeletal:         General: No swelling.      Cervical back: Normal range of motion and neck supple.   Skin:     General: Skin is warm.      Capillary Refill: Capillary refill takes 2 to 3 seconds.      Comments: R sided PICC line in basilic vein   Neurological:      General: No focal deficit present.      Mental Status: He is alert and oriented to person, place, and time.   Psychiatric:         Mood and Affect: Mood normal.         Behavior: Behavior normal.          Significant Labs:  CBC:  Recent Labs   Lab 06/03/23  0532 06/04/23  0513 06/05/23  0418   WBC 12.69 14.22* 16.96*   RBC 4.92 5.37 5.12   HGB 12.1* 13.2* 12.6*   HCT 39.4* 40.9 38.8*    250 249   MCV 80* 76* 76*   MCH 24.6* 24.6* 24.6*   MCHC 30.7* 32.3 32.5       BNP:  Recent Labs   Lab 06/05/23  0418   BNP 52       CMP:  Recent  Labs   Lab 06/02/23  1652 06/03/23  0532 06/04/23  0513 06/04/23  1638 06/05/23  0418 06/05/23  0419   GLU  --    < > 247* 200*  --  210*   CALCIUM 8.9   < > 9.5 9.4  --  9.3   ALBUMIN 4.0  --   --   --  3.1* 3.1*   PROT  --   --   --   --  7.4 7.5   *   < > 133* 137  --  135*   K 4.1   < > 4.6 4.3  --  3.8   CO2 21*   < > 21* 25  --  28   CL  --    < > 102 102  --  99   BUN 7.2*   < > 11 13  --  11   CREATININE 1.65*   < > 1.4 1.3  --  1.2   ALKPHOS 101  --   --   --  84 82   ALT 10  --   --   --  26 24   AST 22  --   --   --  35 37   BILITOT 1.0  --   --   --  0.5 0.5    < > = values in this interval not displayed.        Coagulation:   Recent Labs   Lab 06/03/23  0532 06/04/23  0513 06/05/23 0418   INR 2.0* 2.9* 2.6*   APTT 37.5* 39.6* 34.7*       LDH:  Recent Labs   Lab 06/03/23  0532 06/04/23  0513 06/05/23 0418    350* 247       Microbiology:  Microbiology Results (last 7 days)       Procedure Component Value Units Date/Time    Blood culture [[id number]] Collected: 06/05/23 1329    Order Status: Completed Specimen: Blood Updated: 06/06/23 0115     Blood Culture, Routine No Growth to date    Blood culture [[id number]] Collected: 06/05/23 1319    Order Status: Completed Specimen: Blood Updated: 06/06/23 0115     Blood Culture, Routine No Growth to date    Aerobic culture [[id number]]  (Abnormal)  (Susceptibility) Collected: 06/03/23 0135    Order Status: Completed Specimen: Skin from Abdomen Updated: 06/05/23 1113     Aerobic Bacterial Culture STAPHYLOCOCCUS AUREUS  Moderate      Narrative:      LVAD DLES    Blood culture [[id number]] Collected: 06/03/23 0040    Order Status: Completed Specimen: Blood Updated: 06/05/23 0612     Blood Culture, Routine No Growth to date      No Growth to date      No Growth to date    Blood culture [[id number]] Collected: 06/03/23 0040    Order Status: Completed Specimen: Blood Updated: 06/05/23 0612     Blood Culture, Routine No Growth to date      No Growth to date       No Growth to date    Culture, Anaerobe [[id number]] Collected: 06/03/23 0135    Order Status: Completed Specimen: Skin from Abdomen Updated: 06/04/23 1009     Anaerobic Culture Culture in progress    Narrative:      DLES            I have reviewed all pertinent labs within the past 24 hours.    Estimated Creatinine Clearance: 99.8 mL/min (based on SCr of 1.2 mg/dL).    Diagnostic Results:  I have reviewed and interpreted all pertinent imaging results/findings within the past 24 hours.    Assessment and Plan:     Patient is a 36 yo black male with stage d HFrEF, NICM, ? Familial CM (Father had LVAD and subsequent heart transplant), h/o PSA, DM2 on insulin who is s/p DT-HM3 implantation 6/23/2022, post op course complicated by early RV failure requiring RVAD with ProTek Duo after admitted with ADHF/cardiogenic shock on home milrinone requiring IABP. He underwent RVAD removal and chest closure 6/30/2022.  He also completed a course of IV Abx for staph epi. He was weaned off  but he had to restarted due to RVF. He was eventually transitioned to milrinone (secondary to  shortage) now on 0.25 mcg/kg/min.    He presented to an outside ED with complaints of fever (tmax 101 F), myalgias, and headache since Tuesday,  He also reported drainage from his DLES over the past 2 days.  He has a cough productive of a small amount of clear sputum.  No N/V or nasal congestion.  No VAD alarms.  Spouse also had fever and flu like symptoms over the past week. Otherwise states he has no other issues and thought that he had the flu.      Upon presentation to the ER, vitals stable, satting well on room air.  Labs show a sCr of 1.65 (baseline Cr 1.3), Leukocytosis to 13K, initial lactate normal at 1.9, COVID-19 PCR positive, Influenza PCR negative for A & B, CXR unremarkable.  CT abdomen was obtained to evaluate DLES drainage, shows mild stranding along distal drive line but otherwise unremarkable, no fluid collection.  He was started  on vancomycin at the outside ED and was transferred to [facility] for COVID-19 infection in addition to possible LVAD DLES infection.      Home Medications:   Valsartan 40mg BID  Aldactone 25mg daily  Lasix 80mg PO daily (takes 40 BID due to cramping)  Milrinone 0.25 mcg/kg/min      * Left ventricular assist device (LVAD) complication, initial encounter  - DLES drainage noted with anaerobic cultures positive for MSSA. As per ID, continue cefazolin (started 6/5/2023). Aerobic cultures pending. Vancomycin and zosyn stopped (6/3/2023 - 6/5/2023).   - Repeat blood cultures ordered (6/5/2023) due to up-trending leukocytosis which has now resolved. Possibly steriod induced in the setting of receiving a dose of Decadron on day of admission.   - As per ID, if patient repeat blood cultures remain negative he can most likely be discharged on 14 day course PO abx (cefadroxil bid).     COVID-19  Patient tested positive for COVID on 6/2/2023  Date of symptom onset: 5/30/22  O2 requirement: room air  Completed Remdesivir course (6/3/2023 - 6/5/2023)      LVAD (left ventricular assist device) present  Procedure: Device Interrogation Including analysis of device parameters  Current Settings: Ventricular Assist Device  Review of device function is stable  Continue coumadin at home dose for INR goal of 2.0-3.0, monitor daily in the AM      TXP LVAD INTERROGATIONS 6/6/2023 6/6/2023 6/5/2023 6/5/2023 6/5/2023 6/5/2023 6/5/2023   Type HeartMate3 HeartMate3 HeartMate3 HeartMate3 HeartMate3 HeartMate3 HeartMate3   Flow 4.1 4.1 4.4 3.9 3.8 3.6 3.9   Speed 5100 5100 5100 5400 5100 5100 5100   PI 4.2 5.1 4.4 4.8 5.9 3.9 4.5   Power (Serna) 3.6 3.7 3.7 3.6 3.7 3.6 3.7   LSL 4400 4400 4400 4700 4700 4700 4700   Low Flow Alarm - - - - - - -   High Power Alarm - - - - - - -   Pulsatility Intermittent pulse Intermittent pulse Intermittent pulse Pulse Pulse Intermittent pulse Intermittent pulse       Seizure-like activity  Avoiding cefepime given hx of  seizure with possibility of lowering seizure threshold  Continue keppra 1,000mg BID    Type 2 diabetes mellitus with hyperglycemia  - Management as per Endocrinology.     Chronic combined systolic and diastolic heart failure  - Last Echo (4/19/2023): HM3 5100. IV septum midline. AV does not open. EF 10%. LV severely enlarged w/ severely decreased systolic function. Indeterminate LV diastolic dysfunction. Severe RV enlargment w/ mod severely reduced RV systolic function. Biatrial enlargment. Mod to severe TR. Mild to mod MR. PA sys pressure 44mmHg.   - Last RHC (10/31/2022): RA: 21/ 20/ 18, RV: 35/ 7/ 22, PA: 35/ 19/ 24, PWP: 22/ 22/ 21. CO 3.55  by Naomi. CI 1.6.  - Home GDMT: Spironolactone 25mg daily and Valsartan 40mg BID  - Home diuretic regimen: Lasix 40mg bid  - Euvolemic on exam today. Continue home GDMT and diuretic regimen.   - Ionotropes: Continue chronic mil 0.25  - Strict I&Os and daily weights.   - Maintain K>4 and Mg>2          Chantal Herndon MD  Heart Transplant  [facility]

## 2023-06-06 NOTE — PLAN OF CARE
[facility]   Heart Transplant Clinic  [address] [place] [address]   [phone] after hours        HOME  HEALTH ORDERS      Admit to Home Health    Diagnosis:   Patient Active Problem List   Diagnosis    Anxiety disorder, unspecified    Anemia of chronic disease    Ecstasy abuse    Cannabis use disorder, severe, dependence    Chronic combined systolic and diastolic heart failure    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    Type 2 diabetes mellitus with hyperglycemia    Insomnia    SOB (shortness of breath)    Familial dilated cardiomyopathy    LVAD (left ventricular assist device) present    Tingling in extremities    Seizure-like activity    Temporal lobe seizure    Examination of participant in clinical trial    Awareness alteration, transient    Right heart failure secondary to left heart failure-post LVAD surgery    History of substance abuse    Hyperglycemia    Headache    COVID-19    Left ventricular assist device (LVAD) complication, initial encounter       Patient is homebound due to:   Diet: Low Sodium  Acitivities: As Tolerated    Nursing:   SN to complete comprehensive assessment including routine vital signs. Instruct on disease process and s/s of complications to report to MD. Review/verify medication list sent home with the patient at time of discharge  and instruct patient/caregiver as needed. Frequency may be adjusted depending on start of care date.    Notify MD if SBP > 160 or < 90; DBP > 90 or < 50; HR > 120 or < 50; Temp > 101; Weight gain >3lbs in 1 day or 5lbs in 1 week.    LABS:  SN to perform labs: cbc, cmp, and Mg weekly.     HOME INFUSION THERAPY:  milrinone  SN to perform Infusion Therapy/Central Line Care.  Review Central Line Care & Central Line Flush with patient.    Administer (drug and dose): milrinone 0.25mcg/kg/min x 99.4kg    **For questions or concerns, please call [phone] and ask for Pre-Heart transplant clinic, M-F  8-5. After hours, weekends, call [phone] and ask for the Heart Transplant Cardiologist on call.**    Central line care:  Scrub the Hub: Prior to accessing the line, always perform a 30 second alcohol scrub  Each lumen of the central line is to be flushed at least daily with 10 mL Normal Saline and 3 mL Heparin flush (100 units/mL)  Skilled Nurse (SN) may draw blood from IV access  Blood Draw Procedure:   - Aspirate at least 5 mL of blood   - Discard   - Obtain specimen   - Change posiflow cap   - Flush with 20 mL Normal Saline followed by a                 3-5 mL Heparin flush (100 units/mL)  Central :   - Sterile dressing changes are done weekly and as needed.   - Use chlor-hexadine scrub to cleanse site, apply Biopatch to insertion site,       apply securement device dressing   - Posi-flow caps are changed weekly and after EVERY lab draw.   - If sterile gauze is under dressing to control oozing,                 dressing change must be performed every 24 hours until gauze is not needed.      Send initial Home Health orders to HTS attending physician on call.  Send follow up questions to [phone] or fax:                     Heart Failure:      [phone]

## 2023-06-06 NOTE — CARE UPDATE
Care Update:     No acute events overnight. Patient on the CSU in room 307/307 A. Blood glucose stable. BG at and above goal on current insulin regimen (SSI, prandial, and basal insulin ). Steroid use- None.    Renal function- Normal   Vasopressors-  None       Diet Vegetarian Lacto Ovo (and additional linked orders)     POCT Glucose   Date Value Ref Range Status   06/06/2023 118 (H) 70 - 110 mg/dL Final   06/05/2023 195 (H) 70 - 110 mg/dL Final   06/05/2023 178 (H) 70 - 110 mg/dL Final   06/05/2023 173 (H) 70 - 110 mg/dL Final   06/05/2023 118 (H) 70 - 110 mg/dL Final   06/04/2023 195 (H) 70 - 110 mg/dL Final   06/04/2023 169 (H) 70 - 110 mg/dL Final   06/04/2023 161 (H) 70 - 110 mg/dL Final   06/04/2023 179 (H) 70 - 110 mg/dL Final   06/03/2023 303 (H) 70 - 110 mg/dL Final   06/03/2023 258 (H) 70 - 110 mg/dL Final   06/03/2023 136 (H) 70 - 110 mg/dL Final   06/03/2023 131 (H) 70 - 110 mg/dL Final     Lab Results   Component Value Date    HGBA1C 7.9 (H) 04/02/2023       Endocrinology consulted for BG management.   BG goal 140-180    Diabetes Medications               insulin aspart U-100 (NOVOLOG) 100 unit/mL (3 mL) InPn pen Inject 16 Units into the skin 3 (three) times daily with meals. Plus Sliding Scale: 151-200: +1, 201-250: +2, 251-300: +3, 301-350: +4 and call MD; Max Daily Dose: 60 units    insulin detemir U-100 (LEVEMIR FLEXTOUCH) 100 unit/mL (3 mL) SubQ InPn pen Inject 22 Units into the skin 2 (two) times daily.          Hospital Medications    BG checks AC/HS           dextrose 40 % gel 15,000 mg 15,000 mg, Oral, As needed (PRN)    dextrose 40 % gel 30,000 mg 30,000 mg, Oral, As needed (PRN)    glucagon (human recombinant) injection 1 mg 1 mg, Intramuscular, As needed (PRN), Turn patient on their side, give IM, and NOTIFY MD IMMEDIATELY.<BR><BR>Feed the patient as soon as patient awakens and is able to swallow.    insulin aspart U-100 pen 1-10 Units 1-10 Units, Subcutaneous, Before meals &amp; nightly  PRN, **MODERATE CORRECTION DOSE**<BR>Blood Glucose<BR>mg/dL                  Pre-meal                2200<BR>151-200                2 units                    1 unit<BR>201-250                4 units                    2 units  <BR>251-300                6 units                    3 units  <BR>301-350                8 units                    4 units <BR>&gt;350                     10 units                  5 units<BR>Administer subcutaneously if needed at times designated by monitoring schedule. <BR>DO NOT HOLD correction dose insulin for patients who are  NPO.<BR>&quot;HIGH ALERT MEDICATION&quot; - Administer with meals or TF/TPN.    insulin aspart U-100 pen 12 Units 12 Units, Subcutaneous, 3 times daily with meals, Hold if pt NPO<BR>&quot;HIGH ALERT MEDICATION&quot; - Administer with meals or TF/TPN.    insulin detemir U-100 (Levemir) pen 17 Units 17 Units, Subcutaneous, 2 times daily, If Blood Glucose is less than 100 mg/dL at BEDTIME, give 16 grams (four glucose tablets) X 1 dose for patients who can take PO. Recheck BG in 30 minutes and call MD for insulin dose adjustments prior to administering insulin detemir (Levemir).<BR>&quot;HIGH ALERT MEDICATION&quot;              ** Please notify Endocrine for any change and/or advance in diet**  ** Please call Endocrine for any BG related issues **    Discharge Planning:   TBD. Please notify endocrinology prior to discharge.      Michael Wyman DNP, FNP-C  Department of Endocrinology  Inpatient Glycemic Management

## 2023-06-06 NOTE — NURSING
"LVAD dressing change completed using sterile technique with daily kit. DLES is a "2" with moderate drainage noted on the drain sponge. Tolerated without any complication. Sutures remain intact, no redness, or tenderness noted.    "

## 2023-06-06 NOTE — PROGRESS NOTES
Discharge Note:  SW notified that pt will be dc home today.  Pt to resume home milrinone.  SW notified Franki[facility] ([facility]) of same.  Orders placed.  Pt reported that he has home milrinone with him for dc home today, and Zulma said pt is good to dc from his standpoint.  [employer] will send out a new delivery to pt's home with new orders (slight weight adjustment). Pt has been advised of same.   Pt advised that he has a ride home.  Pt voiced understanding and agreement with dc plans for today. No other needs reported at this time by pt or team.

## 2023-06-06 NOTE — SUBJECTIVE & OBJECTIVE
Interval History: NAEON. No acute complaints.     Continuous Infusions:   milrinone 20mg/100ml D5W (200mcg/ml) 0.25 mcg/kg/min (06/05/23 1955)     Scheduled Meds:   albuterol  2 puff Inhalation Q6H    ascorbic acid (vitamin C)  500 mg Oral BID    busPIRone  7.5 mg Oral Daily    ceFAZolin (ANCEF) IVPB  2 g Intravenous Q8H    famotidine  40 mg Oral Daily    furosemide  40 mg Oral BID    insulin aspart U-100  12 Units Subcutaneous TIDWM    insulin detemir U-100 (Levemir)  17 Units Subcutaneous BID    INV ASPIRIN 100MG/PLACEBO  100 mg Oral Daily    levETIRAcetam  1,000 mg Oral BID    mirtazapine  15 mg Oral Nightly    multivitamin  1 tablet Oral Daily    polyethylene glycol  17 g Oral Daily    spironolactone  25 mg Oral Daily    valsartan  40 mg Oral BID    warfarin  4.5 mg Oral Daily     PRN Meds:acetaminophen, cyclobenzaprine, dextrose 10%, dextrose 10%, dextrose 10%, dextrose 10%, dextrose, dextrose, glucagon (human recombinant), insulin aspart U-100, senna, sodium chloride 0.9%    Review of patient's allergies indicates:   Allergen Reactions    Aspirin Other (See Comments)     Mr. Thacker is enrolled in Dr. Paige's Alon Trial and cannot have any aspirin and/or aspirin-containing products. DO NOT cancel any orders for INV Aspirin 100 mg/Placebo. If you have questions, please contact Isabel @ [phone], [phone],[email or web address], secure chat or MS Teams message.    Bumex [bumetanide] Hives    Lactose Diarrhea     Other reaction(s): Abdominal distension, gaseous    Torsemide Hives     Objective:     Vital Signs (Most Recent):  Temp: 97 °F (36.1 °C) (06/06/23 0421)  Pulse: 84 (06/06/23 0421)  Resp: 18 (06/06/23 0421)  BP: (!) 72/0 (06/06/23 0421)  SpO2: 95 % (06/06/23 0421) Vital Signs (24h Range):  Temp:  [96.8 °F (36 °C)-97.8 °F (36.6 °C)] 97 °F (36.1 °C)  Pulse:  [68-98] 84  Resp:  [16-18] 18  SpO2:  [94 %-100 %] 95 %  BP: ()/(0-63) 72/0     No data found.    Body mass index is 27.39  kg/m².      Intake/Output Summary (Last 24 hours) at 6/6/2023 0449  Last data filed at 6/6/2023 0055  Gross per 24 hour   Intake 1080 ml   Output 2075 ml   Net -995 ml                Physical Exam  Constitutional:       Appearance: Normal appearance.   HENT:      Head: Normocephalic and atraumatic.      Mouth/Throat:      Mouth: Mucous membranes are moist.   Neck:      Comments: JVP 8cm H20 at 45 degrees  Cardiovascular:      Rate and Rhythm: Normal rate.      Comments: Normal VAD hum can be heard  Pulmonary:      Effort: Pulmonary effort is normal. No respiratory distress.      Breath sounds: Normal breath sounds. No wheezing or rales.   Abdominal:      General: Abdomen is flat.      Tenderness: There is no abdominal tenderness. There is no guarding.   Musculoskeletal:         General: No swelling.      Cervical back: Normal range of motion and neck supple.   Skin:     General: Skin is warm.      Capillary Refill: Capillary refill takes 2 to 3 seconds.      Comments: R sided PICC line in basilic vein   Neurological:      General: No focal deficit present.      Mental Status: He is alert and oriented to person, place, and time.   Psychiatric:         Mood and Affect: Mood normal.         Behavior: Behavior normal.          Significant Labs:  CBC:  Recent Labs   Lab 06/03/23  0532 06/04/23  0513 06/05/23 0418   WBC 12.69 14.22* 16.96*   RBC 4.92 5.37 5.12   HGB 12.1* 13.2* 12.6*   HCT 39.4* 40.9 38.8*    250 249   MCV 80* 76* 76*   MCH 24.6* 24.6* 24.6*   MCHC 30.7* 32.3 32.5       BNP:  Recent Labs   Lab 06/05/23 0418   BNP 52       CMP:  Recent Labs   Lab 06/02/23  1652 06/03/23  0532 06/04/23  0513 06/04/23  1638 06/05/23  0418 06/05/23 0419   GLU  --    < > 247* 200*  --  210*   CALCIUM 8.9   < > 9.5 9.4  --  9.3   ALBUMIN 4.0  --   --   --  3.1* 3.1*   PROT  --   --   --   --  7.4 7.5   *   < > 133* 137  --  135*   K 4.1   < > 4.6 4.3  --  3.8   CO2 21*   < > 21* 25  --  28   CL  --    < > 102  102  --  99   BUN 7.2*   < > 11 13  --  11   CREATININE 1.65*   < > 1.4 1.3  --  1.2   ALKPHOS 101  --   --   --  84 82   ALT 10  --   --   --  26 24   AST 22  --   --   --  35 37   BILITOT 1.0  --   --   --  0.5 0.5    < > = values in this interval not displayed.        Coagulation:   Recent Labs   Lab 06/03/23 0532 06/04/23 0513 06/05/23 0418   INR 2.0* 2.9* 2.6*   APTT 37.5* 39.6* 34.7*       LDH:  Recent Labs   Lab 06/03/23 0532 06/04/23 0513 06/05/23 0418    350* 247       Microbiology:  Microbiology Results (last 7 days)       Procedure Component Value Units Date/Time    Blood culture [[id number]] Collected: 06/05/23 1329    Order Status: Completed Specimen: Blood Updated: 06/06/23 0115     Blood Culture, Routine No Growth to date    Blood culture [[id number]] Collected: 06/05/23 1319    Order Status: Completed Specimen: Blood Updated: 06/06/23 0115     Blood Culture, Routine No Growth to date    Aerobic culture [[id number]]  (Abnormal)  (Susceptibility) Collected: 06/03/23 0135    Order Status: Completed Specimen: Skin from Abdomen Updated: 06/05/23 1113     Aerobic Bacterial Culture STAPHYLOCOCCUS AUREUS  Moderate      Narrative:      LVAD DLES    Blood culture [[id number]] Collected: 06/03/23 0040    Order Status: Completed Specimen: Blood Updated: 06/05/23 0612     Blood Culture, Routine No Growth to date      No Growth to date      No Growth to date    Blood culture [[id number]] Collected: 06/03/23 0040    Order Status: Completed Specimen: Blood Updated: 06/05/23 0612     Blood Culture, Routine No Growth to date      No Growth to date      No Growth to date    Culture, Anaerobe [[id number]] Collected: 06/03/23 0135    Order Status: Completed Specimen: Skin from Abdomen Updated: 06/04/23 1009     Anaerobic Culture Culture in progress    Narrative:      DLES            I have reviewed all pertinent labs within the past 24 hours.    Estimated Creatinine Clearance: 99.8 mL/min (based on SCr of 1.2  mg/dL).    Diagnostic Results:  I have reviewed and interpreted all pertinent imaging results/findings within the past 24 hours.

## 2023-06-06 NOTE — PROGRESS NOTES
DISCHARGE NOTE:    Kevan Queen is a 38 y.o. male s/p HM3 VAD admitted for DL drainage and COVID     Hospital Course: He was given remdesivir with asymptomatic COVID. DL drainage was treated with BS abx. Subsequently, cultures revealed MSSA and the patient will take doxycycline for 14 days.     Pharmacy Interventions/Recommendations:  1) INR Goal: 2-3     2) Antiplatelet Agents: GASTON trial     3) Heparin Bridging:  Yes     4) INR Follow-Up/Discharge Needs:  repeat INR next week.     See list of discharge medication for dosing instructions.     Kevan Queen and his caregiver verbalized their understanding and had the opportunity to ask questions.      Discharge Medications:     Medication List        START taking these medications      doxycycline 100 MG Cap  Commonly known as: VIBRAMYCIN  Take 1 capsule (100 mg total) by mouth 2 (two) times daily.     INV aspirin/placebo tablet  Take 1 tablet (100 mg total) by mouth once daily. FOR INVESTIGATIONAL USE ONLY. Protocol: CRD-971  [facility]-3. subject # 4130            CHANGE how you take these medications      potassium chloride SA 20 MEQ tablet  Commonly known as: K-DURKLOR-CON  Take 2 tablets (40 mEq total) by mouth 2 (two) times daily.  What changed: when to take this     warfarin 3 MG tablet  Commonly known as: COUMADIN  Take 1.5 tablets (4.5mg) orally daily, except 2 tablets (6mg) on Wednesdays and saturdays  What changed: additional instructions            CONTINUE taking these medications      busPIRone 7.5 MG tablet  Commonly known as: BUSPAR  Take 1 tablet (7.5 mg total) by mouth once daily at 6am.     famotidine 40 MG tablet  Commonly known as: PEPCID  Take 1 tablet (40 mg total) by mouth once daily.     furosemide 80 MG tablet  Commonly known as: LASIX  Take 1 tablet (80 mg total) by mouth once daily.     HYDROcodone-acetaminophen 5-325 mg per tablet  Commonly known as: NORCO  Take 1 tablet by mouth every 8 (eight) hours as needed for Pain.     insulin  aspart U-100 100 unit/mL (3 mL) Inpn pen  Commonly known as: NovoLOG  Inject 16 Units into the skin 3 (three) times daily with meals. Plus Sliding Scale: 151-200: +1, 201-250: +2, 251-300: +3, 301-350: +4 and call MD; Max Daily Dose: 60 units     INV ASPIRIN 100MG/PLACEBO  Take 1 capsule (100 mg) by mouth once daily. FOR INVESTIGATIONAL USE ONLY. Protocol: GASTON III subject # 4204.     LEVEMIR FLEXTOUCH U100 INSULIN 100 unit/mL (3 mL) Inpn pen  Generic drug: insulin detemir U-100 (Levemir)  Inject 22 Units into the skin 2 (two) times daily.     levETIRAcetam 1000 MG tablet  Commonly known as: KEPPRA  Take 1 tablet (1,000 mg total) by mouth 2 (two) times daily.     milrinone 20mg/100ml D5W (200mcg/ml) 20 mg/100 mL (200 mcg/mL) infusion  Commonly known as: PRIMACOR  Inject 34.875 mcg/min into the vein continuous.     mirtazapine 15 MG tablet  Commonly known as: REMERON  Take 1 tablet (15 mg total) by mouth nightly.     spironolactone 25 MG tablet  Commonly known as: ALDACTONE  Take 1 tablet (25 mg total) by mouth once daily.     TRUE METRIX GLUCOSE METER Misc  Generic drug: blood-glucose meter  Use as instructed     TRUE METRIX GLUCOSE TEST STRIP Strp  Generic drug: blood sugar diagnostic  Use to test blood glucose 4 (four) times daily.     TRUEPLUS LANCETS 33 gauge Misc  Generic drug: lancets  Use to test blood glucose 4 (four) times daily.     valsartan 40 MG tablet  Commonly known as: DIOVAN  Take 1 tablet (40 mg total) by mouth 2 (two) times daily.            STOP taking these medications      ferrous gluconate 324 mg (37.5 mg iron) Tab tablet     gabapentin 100 MG capsule  Commonly known as: NEURONTIN               Where to Get Your Medications        These medications were sent to [facility]  [address] [address]      Hours: Mon-Fri, 8a-5:30p   INV aspirin/placebo tablet       These medications were sent to [id number] [address]  [facility] [address] [address] [facility]  [address] [address]      Hours: 24-hours Phone: [phone]   doxycycline 100 MG Cap       Information about where to get these medications is not yet available    Ask your nurse or doctor about these medications  potassium chloride SA 20 MEQ tablet  warfarin 3 MG tablet

## 2023-06-06 NOTE — ASSESSMENT & PLAN NOTE
- DLES drainage noted with anaerobic cultures positive for MSSA. As per ID, continue cefazolin (started 6/5/2023). Aerobic cultures pending. Vancomycin and zosyn stopped (6/3/2023 - 6/5/2023).   - Repeat blood cultures ordered (6/5/2023) due to up-trending leukocytosis which has now resolved. Possibly steriod induced in the setting of receiving a dose of Decadron on day of admission.   - As per ID, if patient repeat blood cultures remain negative he can most likely be discharged on 14 day course PO abx (cefadroxil bid).

## 2023-06-06 NOTE — PLAN OF CARE
Problem: Adult Inpatient Plan of Care  Goal: Plan of Care Review  Outcome: Ongoing, Progressing  Goal: Patient-Specific Goal (Individualized)  Outcome: Ongoing, Progressing  Goal: Absence of Hospital-Acquired Illness or Injury  Outcome: Ongoing, Progressing  Goal: Optimal Comfort and Wellbeing  Outcome: Ongoing, Progressing  Goal: Readiness for Transition of Care  Outcome: Ongoing, Progressing     Problem: Diabetes Comorbidity  Goal: Blood Glucose Level Within Targeted Range  Outcome: Ongoing, Progressing     Problem: Infection  Goal: Absence of Infection Signs and Symptoms  Outcome: Ongoing, Progressing     Problem: Fall Injury Risk  Goal: Absence of Fall and Fall-Related Injury  Outcome: Ongoing, Progressing    Patient AAOx4. Plan of care reviewed with patient. All questions, comments, and concerns addressed. Telemetry monitoring in place. Vitals q 4rs and prn. Call light light within reach. Bed in lowest possible position. LVAD in place.

## 2023-06-06 NOTE — PROGRESS NOTES
06/06/2023  Ruma Li    Current provider:  Dalia Crum MD    Device interrogation:  TXP LVAD INTERROGATIONS 6/6/2023 6/6/2023 6/6/2023 6/5/2023 6/5/2023 6/5/2023 6/5/2023   Type HeartMate3 HeartMate3 HeartMate3 HeartMate3 HeartMate3 HeartMate3 HeartMate3   Flow 4.3 4.1 4.1 4.4 3.9 3.8 3.6   Speed 5100 5100 5100 5100 5400 5100 5100   PI 4.7 4.2 5.1 4.4 4.8 5.9 3.9   Power (Serna) 3.7 3.6 3.7 3.7 3.6 3.7 3.6   LSL 4400 4400 4400 4400 4700 4700 4700   Low Flow Alarm - - - - - - -   High Power Alarm - - - - - - -   Pulsatility Intermittent pulse Intermittent pulse Intermittent pulse Intermittent pulse Pulse Pulse Intermittent pulse          Rounded on Kevan Queen to ensure all mechanical assist device settings (IABP or VAD) were appropriate and all parameters were within limits.  I was able to ensure all back up equipment was present, the staff had no issues, and the Perfusion Department daily rounding was complete.      For implantable VADs: Interrogation of Ventricular assist device was performed with analysis of device parameters and review of device function. I have personally reviewed the interrogation findings and agree with findings as stated.     In emergency, the nursing units have been notified to contact the perfusion department either by:  Calling [id number] from 630am to 4pm Mon thru Fri, utilizing the On-Call Finder functionality of Epic and searching for Perfusion, or by contacting the hospital  from 4pm to 630am and on weekends and asking to speak with the perfusionist on call.    10:07 AM

## 2023-06-06 NOTE — HOSPITAL COURSE
Upon admission, patient was started on broad spectrum abx, cultures were drawn, and ID was consulted. Driveline cultures came back positive for MSSA with negative blood cultures. Patient was cleared by ID to go home with 2 week course of PO doxycycline. Patient also tested positive for COVID during hospitalization without any symptoms. Completed a 3 day course of Remdesavir prior to discharge.

## 2023-06-06 NOTE — PROGRESS NOTES
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  CT abdo without focal fluid collection. Pt reports feeling better, no diarrhea, improvement in drainage.    Recommendations:  -Upon discharge can change to doxycycline 100mg BID to complete 2 weeks total of antibiotics ending 6/15/23.  -Discussed with patient to avoid shaving his abdominal hair for the time being, as this may have put him at risk of DLES infection in the first place    ID  COVID-19  +sick contact and pt was found to have COVID positive PCR.  CXR - no focal consolidation.  On room air.    Recommendations:  -covid precautions per infection control              Thank you for your consult. I will sign off. Please contact us if you have any additional questions.    Michael Macias MD  Infectious Disease  [facility] - [facility] Step[facility]    Subjective:     Principal Problem:Left ventricular assist device (LVAD) complication, initial encounter    HPI: 38 yo male with HM3 6/23/22 c/b early RV failure requiring RVAD (removal/chest closure 6/30/22), CONS bacteremia s/p tx, on IV milrinone at home admitted to [facility] for fever, found to have COVID and DLES drainage. ID consulted for abx recs. Pt reported several day hx of increased DLES drainage with fevers, dry cough, and exposure to sick contact (spouse with flu like symptoms and nephew). Work up notable for COVID PCR positive and CT abdo without evidence of fluid collection. He is currently on vanc, zosyn, and remdesivir. Not on supplemental O2. Denies issues with his chronic R picc.         Interval History: NAEO, afebrile.  Discharge  from DLES still present but improving.    Review of Systems   Constitutional:  Negative for chills and fever.   Respiratory:  Negative for cough and shortness of breath.    Gastrointestinal:  Negative for abdominal pain, diarrhea, nausea and vomiting.   Objective:     Vital Signs (Most Recent):  Temp: 97.6 °F (36.4 °C) (06/06/23 1123)  Pulse: 80 (06/06/23 1247)  Resp: 16 (06/06/23 1247)  BP: (!) 76/0 (06/06/23 1123)  SpO2: 99 % (06/06/23 1123) Vital Signs (24h Range):  Temp:  [97 °F (36.1 °C)-97.8 °F (36.6 °C)] 97.6 °F (36.4 °C)  Pulse:  [74-98] 80  Resp:  [16-18] 16  SpO2:  [94 %-100 %] 99 %  BP: ()/(0-63) 76/0     Weight: 99.4 kg (219 lb 2.2 oz)  Body mass index is 27.39 kg/m².    Estimated Creatinine Clearance: 99.8 mL/min (based on SCr of 1.2 mg/dL).     Physical Exam  Constitutional:       General: He is not in acute distress.     Appearance: He is not toxic-appearing.   HENT:      Head: Normocephalic and atraumatic.   Eyes:      General: No scleral icterus.        Right eye: No discharge.         Left eye: No discharge.   Pulmonary:      Effort: Pulmonary effort is normal. No respiratory distress.   Abdominal:      General: There is no distension.      Palpations: Abdomen is soft.      Tenderness: There is no abdominal tenderness.      Comments: DLES dressed; slightly soaked from discharge   Musculoskeletal:      Right lower leg: No edema.      Left lower leg: No edema.   Skin:     General: Skin is warm and dry.      Comments: RUE picc   Neurological:      Mental Status: He is alert.        Significant Labs: All pertinent labs within the past 24 hours have been reviewed.    Significant Imaging: I have reviewed all pertinent imaging results/findings within the past 24 hours.

## 2023-06-06 NOTE — SUBJECTIVE & OBJECTIVE
Interval History: NAEO, afebrile.  Discharge from DLES still present but improving.    Review of Systems   Constitutional:  Negative for chills and fever.   Respiratory:  Negative for cough and shortness of breath.    Gastrointestinal:  Negative for abdominal pain, diarrhea, nausea and vomiting.   Objective:     Vital Signs (Most Recent):  Temp: 97.6 °F (36.4 °C) (06/06/23 1123)  Pulse: 80 (06/06/23 1247)  Resp: 16 (06/06/23 1247)  BP: (!) 76/0 (06/06/23 1123)  SpO2: 99 % (06/06/23 1123) Vital Signs (24h Range):  Temp:  [97 °F (36.1 °C)-97.8 °F (36.6 °C)] 97.6 °F (36.4 °C)  Pulse:  [74-98] 80  Resp:  [16-18] 16  SpO2:  [94 %-100 %] 99 %  BP: ()/(0-63) 76/0     Weight: 99.4 kg (219 lb 2.2 oz)  Body mass index is 27.39 kg/m².    Estimated Creatinine Clearance: 99.8 mL/min (based on SCr of 1.2 mg/dL).     Physical Exam  Constitutional:       General: He is not in acute distress.     Appearance: He is not toxic-appearing.   HENT:      Head: Normocephalic and atraumatic.   Eyes:      General: No scleral icterus.        Right eye: No discharge.         Left eye: No discharge.   Pulmonary:      Effort: Pulmonary effort is normal. No respiratory distress.   Abdominal:      General: There is no distension.      Palpations: Abdomen is soft.      Tenderness: There is no abdominal tenderness.      Comments: DLES dressed; slightly soaked from discharge   Musculoskeletal:      Right lower leg: No edema.      Left lower leg: No edema.   Skin:     General: Skin is warm and dry.      Comments: RUE pic   Neurological:      Mental Status: He is alert.        Significant Labs: All pertinent labs within the past 24 hours have been reviewed.    Significant Imaging: I have reviewed all pertinent imaging results/findings within the past 24 hours.

## 2023-06-06 NOTE — ASSESSMENT & PLAN NOTE
Procedure: Device Interrogation Including analysis of device parameters  Current Settings: Ventricular Assist Device  Review of device function is stable  Continue coumadin at home dose for INR goal of 2.0-3.0, monitor daily in the AM      TXP LVAD INTERROGATIONS 6/6/2023 6/6/2023 6/5/2023 6/5/2023 6/5/2023 6/5/2023 6/5/2023   Type HeartMate3 HeartMate3 HeartMate3 HeartMate3 HeartMate3 HeartMate3 HeartMate3   Flow 4.1 4.1 4.4 3.9 3.8 3.6 3.9   Speed 5100 5100 5100 5400 5100 5100 5100   PI 4.2 5.1 4.4 4.8 5.9 3.9 4.5   Power (Serna) 3.6 3.7 3.7 3.6 3.7 3.6 3.7   LSL 4400 4400 4400 4700 4700 4700 4700   Low Flow Alarm - - - - - - -   High Power Alarm - - - - - - -   Pulsatility Intermittent pulse Intermittent pulse Intermittent pulse Pulse Pulse Intermittent pulse Intermittent pulse

## 2023-06-06 NOTE — ASSESSMENT & PLAN NOTE
Patient tested positive for COVID on 6/2/2023  Date of symptom onset: 5/30/22  O2 requirement: room air  Completed Remdesivir course (6/3/2023 - 6/5/2023)

## 2023-06-06 NOTE — ASSESSMENT & PLAN NOTE
+sick contact and pt was found to have COVID positive PCR.  CXR - no focal consolidation.  On room air.    Recommendations:  -covid precautions per infection control

## 2023-06-06 NOTE — ASSESSMENT & PLAN NOTE
DLES infection 2/2 MSSA from drainage cultures.  CT abdo without focal fluid collection. Pt reports feeling better, no diarrhea, improvement in drainage.    Recommendations:  -Upon discharge can change to doxycycline 100mg BID to complete 2 weeks total of antibiotics ending 6/15/23.  -Discussed with patient to avoid shaving his abdominal hair for the time being, as this may have put him at risk of DLES infection in the first place

## 2023-06-06 NOTE — DISCHARGE SUMMARY
[facility] - [facility] Stepdown  Heart Transplant  Discharge Summary      Patient Name: Kevan Queen  MRN: [id number]  Admission Date: 6/2/2023  Hospital Length of Stay: 4 days  Discharge Date and Time: 06/06/2023 3:07 PM  Attending Physician: Dalia Crum MD   Discharging Provider: Chantal Herndon MD  Primary Care Provider: ORALIA Cline     HPI: Patient is a 38 yo black male with stage d HFrEF, NICM, ? Familial CM (Father had LVAD and subsequent heart transplant), h/o PSA, DM2 on insulin who is s/p DT-HM3 implantation 6/23/2022, post op course complicated by early RV failure requiring RVAD with Luizk Duo after admitted with ADHF/cardiogenic shock on home milrinone requiring IABP. He underwent RVAD removal and chest closure 6/30/2022.  He also completed a course of IV Abx for staph epi. He was weaned off  but he had to restarted due to RVF. He was eventually transitioned to milrinone (secondary to  shortage) now on 0.25 mcg/kg/min.    He presented to an outside ED with complaints of fever (tmax 101 F), myalgias, and headache since Tuesday,  He also reported drainage from his DLES over the past 2 days.  He has a cough productive of a small amount of clear sputum.  No N/V or nasal congestion.  No VAD alarms.  Spouse also had fever and flu like symptoms over the past week. Otherwise states he has no other issues and thought that he had the flu.      Upon presentation to the ER, vitals stable, satting well on room air.  Labs show a sCr of 1.65 (baseline Cr 1.3), Leukocytosis to 13K, initial lactate normal at 1.9, COVID-19 PCR positive, Influenza PCR negative for A & B, CXR unremarkable.  CT abdomen was obtained to evaluate DLES drainage, shows mild stranding along distal drive line but otherwise unremarkable, no fluid collection.  He was started on vancomycin at the outside ED and was transferred to [facility] for COVID-19 infection in addition to possible LVAD DLES infection.      Home Medications:    Valsartan 40mg BID  Aldactone 25mg daily  Lasix 80mg PO daily (takes 40 BID due to cramping)  Milrinone 0.25 mcg/kg/min      * No surgery found *     Hospital Course: Upon admission, patient was started on broad spectrum abx, cultures were drawn, and ID was consulted. Driveline cultures came back positive for MSSA with negative blood cultures. Patient was cleared by ID to go home with 2 week course of PO doxycycline. Patient also tested positive for COVID during hospitalization without any symptoms. Completed a 3 day course of Remdesavir prior to discharge.       Goals of Care Treatment Preferences:  Code Status: Full Code    [facility]: [phone]                   Consults (From admission, onward)        Status Ordering Provider     Inpatient consult to Infectious Diseases  Once        Provider:  (Not yet assigned)    Completed VERNON, WEST     Inpatient consult to Endocrinology  Once        Provider:  (Not yet assigned)    Completed VERNON, WEST          Significant Diagnostic Studies: Microbiology:   Blood Culture   Lab Results   Component Value Date    LABBLOO No Growth to date 06/05/2023       Pending Diagnostic Studies:     Procedure Component Value Units Date/Time    Troponin I High Sensitivity [[id number]]     Order Status: Sent Lab Status: No result     Specimen: Blood         Final Active Diagnoses:    Diagnosis Date Noted POA    PRINCIPAL PROBLEM:  Left ventricular assist device (LVAD) complication, initial encounter [T82.9XXA] 06/05/2023 Unknown    COVID-19 [U07.1] 06/03/2023 Yes    LVAD (left ventricular assist device) present [Z95.811] 06/30/2022 Not Applicable    Seizure-like activity [R56.9] 07/20/2022 Yes    Type 2 diabetes mellitus with hyperglycemia [E11.65] 05/25/2022 Yes    Chronic combined systolic and diastolic heart failure [I50.42] 11/05/2020 Yes      Problems Resolved During this Admission:    Diagnosis Date Noted Date Resolved POA     Sepsis [A41.9] 06/03/2023 06/03/2023 Unknown      Discharged Condition: good    Disposition:     Follow Up:    Patient Instructions:   No discharge procedures on file.  Medications:  Reconciled Home Medications:      Medication List      START taking these medications    doxycycline 100 MG Cap  Commonly known as: VIBRAMYCIN  Take 1 capsule (100 mg total) by mouth 2 (two) times daily.     INV aspirin/placebo tablet  Take 1 tablet (100 mg total) by mouth once daily. FOR INVESTIGATIONAL USE ONLY. Protocol: [address]  [place] [id number]. subject # 0033        CHANGE how you take these medications    potassium chloride SA 20 MEQ tablet  Commonly known as: K-DUR,KLOR-CON  Take 2 tablets (40 mEq total) by mouth 2 (two) times daily.  What changed: when to take this     warfarin 3 MG tablet  Commonly known as: COUMADIN  Take 1.5 tablets (4.5mg) orally daily, except 2 tablets (6mg) on Wednesdays and saturdays  What changed: additional instructions        CONTINUE taking these medications    busPIRone 7.5 MG tablet  Commonly known as: BUSPAR  Take 1 tablet (7.5 mg total) by mouth once daily at 6am.     famotidine 40 MG tablet  Commonly known as: PEPCID  Take 1 tablet (40 mg total) by mouth once daily.     furosemide 80 MG tablet  Commonly known as: LASIX  Take 1 tablet (80 mg total) by mouth once daily.     HYDROcodone-acetaminophen 5-325 mg per tablet  Commonly known as: NORCO  Take 1 tablet by mouth every 8 (eight) hours as needed for Pain.     insulin aspart U-100 100 unit/mL (3 mL) Inpn pen  Commonly known as: NovoLOG  Inject 16 Units into the skin 3 (three) times daily with meals. Plus Sliding Scale: 151-200: +1, 201-250: +2, 251-300: +3, 301-350: +4 and call MD; Max Daily Dose: 60 units     INV ASPIRIN 100MG/PLACEBO  Take 1 capsule (100 mg) by mouth once daily. FOR INVESTIGATIONAL USE ONLY. Protocol: GASTON III subject # 3707.     LEVEMIR FLEXTOUCH U100 INSULIN 100 unit/mL (3 mL) Inpn pen  Generic drug: insulin detemir U-100  (Levemir)  Inject 22 Units into the skin 2 (two) times daily.     levETIRAcetam 1000 MG tablet  Commonly known as: KEPPRA  Take 1 tablet (1,000 mg total) by mouth 2 (two) times daily.     milrinone 20mg/100ml D5W (200mcg/ml) 20 mg/100 mL (200 mcg/mL) infusion  Commonly known as: PRIMACOR  Inject 34.875 mcg/min into the vein continuous.     mirtazapine 15 MG tablet  Commonly known as: REMERON  Take 1 tablet (15 mg total) by mouth nightly.     spironolactone 25 MG tablet  Commonly known as: ALDACTONE  Take 1 tablet (25 mg total) by mouth once daily.     TRUE METRIX GLUCOSE METER Misc  Generic drug: blood-glucose meter  Use as instructed     TRUE METRIX GLUCOSE TEST STRIP Strp  Generic drug: blood sugar diagnostic  Use to test blood glucose 4 (four) times daily.     TRUEPLUS LANCETS 33 gauge Misc  Generic drug: lancets  Use to test blood glucose 4 (four) times daily.     valsartan 40 MG tablet  Commonly known as: DIOVAN  Take 1 tablet (40 mg total) by mouth 2 (two) times daily.        STOP taking these medications    ferrous gluconate 324 mg (37.5 mg iron) Tab tablet     gabapentin 100 MG capsule  Commonly known as: SALO Herndon MD  Heart Transplant  [facility] - Cardiology Stepdown

## 2023-06-07 ENCOUNTER — PATIENT OUTREACH (OUTPATIENT)
Dept: ADMINISTRATIVE | Facility: CLINIC | Age: 38
End: 2023-06-07
Payer: MEDICAID

## 2023-06-07 LAB
BACTERIA BLD CULT: NORMAL

## 2023-06-07 NOTE — PROGRESS NOTES
C3 nurse attempted to contact Kevan Queen for a TCC post hospital discharge follow up call. No answer. No voicemail available. The patient has a scheduled appointment with ORALIA Cline on 06/12/23 @ 5043. Will route message to PCP staff to include this as hospital follow up appt if possible.

## 2023-06-07 NOTE — PROGRESS NOTES
Kevan Queen is a 38 y.o. male s/p HM3 VAD admitted for DL drainage and COVID      Hospital Course: He was given remdesivir with asymptomatic COVID. DL drainage was treated with BS abx. Subsequently, cultures revealed MSSA and the patient will take doxycycline for 14 days.

## 2023-06-07 NOTE — DISCHARGE INSTRUCTIONS
Pt was giving Educations and instructions on follow-up appointments and change in medications. Pt was educated on COVID-19 education. Patient understand discharge instructions. Left with simeon Carlson.

## 2023-06-07 NOTE — PROGRESS NOTES
Spoke to patient regarding discharge medication dosing. Patient verified a discharge warfarin dose of 6mg every Wed/Sat, and 4.5mg all other days. [facility]

## 2023-06-07 NOTE — NURSING
Patient discharged with transport provided by family, denies pain, no s/s of distress, peripheral IV removed per MD order, night medications given, discharge instructions given, verbalized understanding, discharged with continuous milrinone drip, home health ordered.

## 2023-06-08 LAB
BACTERIA BLD CULT: NORMAL
BACTERIA BLD CULT: NORMAL
BACTERIA SPEC ANAEROBE CULT: NORMAL

## 2023-06-08 NOTE — PROGRESS NOTES
3rd attempt for TCC Call; Left voicemail. Please call [phone] please leave first and last name and  for Jaron. We will return your call.

## 2023-06-10 LAB
BACTERIA BLD CULT: NORMAL
BACTERIA BLD CULT: NORMAL

## 2023-06-12 ENCOUNTER — OFFICE VISIT (OUTPATIENT)
Dept: FAMILY MEDICINE | Facility: CLINIC | Age: 38
End: 2023-06-12
Payer: MEDICAID

## 2023-06-12 VITALS
WEIGHT: 221.38 LBS | HEART RATE: 61 BPM | BODY MASS INDEX: 27.53 KG/M2 | HEIGHT: 75 IN | OXYGEN SATURATION: 99 % | TEMPERATURE: 98 F

## 2023-06-12 DIAGNOSIS — D63.8 ANEMIA OF CHRONIC DISEASE: ICD-10-CM

## 2023-06-12 DIAGNOSIS — G47.00 INSOMNIA, UNSPECIFIED TYPE: ICD-10-CM

## 2023-06-12 DIAGNOSIS — Z95.811 LVAD (LEFT VENTRICULAR ASSIST DEVICE) PRESENT: ICD-10-CM

## 2023-06-12 DIAGNOSIS — E11.65 TYPE 2 DIABETES MELLITUS WITH HYPERGLYCEMIA, WITH LONG-TERM CURRENT USE OF INSULIN: Primary | ICD-10-CM

## 2023-06-12 DIAGNOSIS — F41.9 ANXIETY DISORDER, UNSPECIFIED TYPE: ICD-10-CM

## 2023-06-12 DIAGNOSIS — Z79.4 TYPE 2 DIABETES MELLITUS WITH HYPERGLYCEMIA, WITH LONG-TERM CURRENT USE OF INSULIN: Primary | ICD-10-CM

## 2023-06-12 DIAGNOSIS — R56.9 SEIZURE-LIKE ACTIVITY: ICD-10-CM

## 2023-06-12 PROBLEM — R20.2 TINGLING IN EXTREMITIES: Status: RESOLVED | Noted: 2022-07-13 | Resolved: 2023-06-12

## 2023-06-12 PROBLEM — R40.4 AWARENESS ALTERATION, TRANSIENT: Status: RESOLVED | Noted: 2022-09-01 | Resolved: 2023-06-12

## 2023-06-12 PROBLEM — R06.02 SOB (SHORTNESS OF BREATH): Status: RESOLVED | Noted: 2022-06-13 | Resolved: 2023-06-12

## 2023-06-12 PROBLEM — T82.9XXA LEFT VENTRICULAR ASSIST DEVICE (LVAD) COMPLICATION, INITIAL ENCOUNTER: Status: RESOLVED | Noted: 2023-06-05 | Resolved: 2023-06-12

## 2023-06-12 PROBLEM — U07.1 COVID-19: Status: RESOLVED | Noted: 2023-06-03 | Resolved: 2023-06-12

## 2023-06-12 PROBLEM — R73.9 HYPERGLYCEMIA: Status: RESOLVED | Noted: 2022-12-16 | Resolved: 2023-06-12

## 2023-06-12 PROBLEM — R51.9 HEADACHE: Status: RESOLVED | Noted: 2023-04-19 | Resolved: 2023-06-12

## 2023-06-12 LAB
CREAT UR-MCNC: 197.5 MG/DL (ref 63–166)
HBA1C MFR BLD: 7.5 %
MICROALBUMIN UR-MCNC: 495.8 UG/ML
MICROALBUMIN/CREAT RATIO PNL UR: 251 MG/GM CR (ref 0–30)

## 2023-06-12 PROCEDURE — 99214 OFFICE O/P EST MOD 30 MIN: CPT | Mod: S$PBB,,, | Performed by: NURSE PRACTITIONER

## 2023-06-12 PROCEDURE — 99214 PR OFFICE/OUTPT VISIT, EST, LEVL IV, 30-39 MIN: ICD-10-PCS | Mod: S$PBB,,, | Performed by: NURSE PRACTITIONER

## 2023-06-12 PROCEDURE — 3060F PR POS MICROALBUMINURIA RESULT DOCUMENTED/REVIEW: ICD-10-PCS | Mod: CPTII,,, | Performed by: NURSE PRACTITIONER

## 2023-06-12 PROCEDURE — 1159F PR MEDICATION LIST DOCUMENTED IN MEDICAL RECORD: ICD-10-PCS | Mod: CPTII,,, | Performed by: NURSE PRACTITIONER

## 2023-06-12 PROCEDURE — 4010F PR ACE/ARB THEARPY RXD/TAKEN: ICD-10-PCS | Mod: CPTII,,, | Performed by: NURSE PRACTITIONER

## 2023-06-12 PROCEDURE — 1160F RVW MEDS BY RX/DR IN RCRD: CPT | Mod: CPTII,,, | Performed by: NURSE PRACTITIONER

## 2023-06-12 PROCEDURE — 3060F POS MICROALBUMINURIA REV: CPT | Mod: CPTII,,, | Performed by: NURSE PRACTITIONER

## 2023-06-12 PROCEDURE — 1111F DSCHRG MED/CURRENT MED MERGE: CPT | Mod: CPTII,,, | Performed by: NURSE PRACTITIONER

## 2023-06-12 PROCEDURE — 3066F PR DOCUMENTATION OF TREATMENT FOR NEPHROPATHY: ICD-10-PCS | Mod: CPTII,,, | Performed by: NURSE PRACTITIONER

## 2023-06-12 PROCEDURE — 3051F PR MOST RECENT HEMOGLOBIN A1C LEVEL 7.0 - < 8.0%: ICD-10-PCS | Mod: CPTII,,, | Performed by: NURSE PRACTITIONER

## 2023-06-12 PROCEDURE — 1159F MED LIST DOCD IN RCRD: CPT | Mod: CPTII,,, | Performed by: NURSE PRACTITIONER

## 2023-06-12 PROCEDURE — 3051F HG A1C>EQUAL 7.0%<8.0%: CPT | Mod: CPTII,,, | Performed by: NURSE PRACTITIONER

## 2023-06-12 PROCEDURE — 99215 OFFICE O/P EST HI 40 MIN: CPT | Mod: PBBFAC,PN | Performed by: NURSE PRACTITIONER

## 2023-06-12 PROCEDURE — 1111F PR DISCHARGE MEDS RECONCILED W/ CURRENT OUTPATIENT MED LIST: ICD-10-PCS | Mod: CPTII,,, | Performed by: NURSE PRACTITIONER

## 2023-06-12 PROCEDURE — 3008F BODY MASS INDEX DOCD: CPT | Mod: CPTII,,, | Performed by: NURSE PRACTITIONER

## 2023-06-12 PROCEDURE — 82043 UR ALBUMIN QUANTITATIVE: CPT | Performed by: NURSE PRACTITIONER

## 2023-06-12 PROCEDURE — 3066F NEPHROPATHY DOC TX: CPT | Mod: CPTII,,, | Performed by: NURSE PRACTITIONER

## 2023-06-12 PROCEDURE — 83036 HEMOGLOBIN GLYCOSYLATED A1C: CPT | Mod: PBBFAC,PN | Performed by: NURSE PRACTITIONER

## 2023-06-12 PROCEDURE — 4010F ACE/ARB THERAPY RXD/TAKEN: CPT | Mod: CPTII,,, | Performed by: NURSE PRACTITIONER

## 2023-06-12 PROCEDURE — 1160F PR REVIEW ALL MEDS BY PRESCRIBER/CLIN PHARMACIST DOCUMENTED: ICD-10-PCS | Mod: CPTII,,, | Performed by: NURSE PRACTITIONER

## 2023-06-12 PROCEDURE — 3008F PR BODY MASS INDEX (BMI) DOCUMENTED: ICD-10-PCS | Mod: CPTII,,, | Performed by: NURSE PRACTITIONER

## 2023-06-12 RX ORDER — POTASSIUM CHLORIDE 1500 MG/1
40 TABLET, EXTENDED RELEASE ORAL 3 TIMES DAILY
Status: ON HOLD | COMMUNITY
Start: 2023-05-15 | End: 2023-08-08 | Stop reason: HOSPADM

## 2023-06-12 RX ORDER — CYCLOBENZAPRINE HYDROCHLORIDE 7.5 MG/1
7.5 TABLET, FILM COATED ORAL EVERY 8 HOURS PRN
Status: ON HOLD | COMMUNITY
Start: 2023-04-26 | End: 2023-08-08 | Stop reason: HOSPADM

## 2023-06-12 RX ORDER — BUSPIRONE HYDROCHLORIDE 10 MG/1
10 TABLET ORAL 2 TIMES DAILY
Status: ON HOLD | COMMUNITY
Start: 2023-04-25 | End: 2023-11-15 | Stop reason: HOSPADM

## 2023-06-12 RX ORDER — MILRINONE LACTATE 1 MG/ML
INJECTION INTRAVENOUS
Status: ON HOLD | COMMUNITY
Start: 2023-05-19 | End: 2023-08-08 | Stop reason: HOSPADM

## 2023-06-12 NOTE — ASSESSMENT & PLAN NOTE
Continue Lexapro chronic, stable  Practice deep breathing or abdominal breathing exercises when anxiety occurs.  Exercise daily. Get sunlight daily.  Avoid caffeine, alcohol and stimulants.  Practice positive phrases and repeat throughout the day, yoga, lavender scents or Chamomile tea will help anxiety.  Set healthy boundaries, avoid people and conversations that increase stress.  Reports any symptoms of suicidal or homicidal ideations immediately, if clinic is closed go to nearest emergency room.

## 2023-06-12 NOTE — ASSESSMENT & PLAN NOTE
Pending Endo appointment  Levemir 22units BID  novolog TID 16units with meals  Refer to eye clinic for diabetic fundus, unable to obtain in clinic.      POCT A1c today 7.5 in clinic.   Foot exam 6/12/23  Microalbumin 6/12/23  Referred to eye clinic for Fundus

## 2023-06-12 NOTE — PROGRESS NOTES
Patient Name: Kevan Queen   : 1985  MRN: [id number]     SUBJECTIVE DATA:    CHIEF COMPLAINT:   Kevan Queen is a 38 y.o. male who presents to clinic today with Follow-up from inpatient admission, DM, Insomnia        HPI:  23:  FU from recent inpatient admission. Patient states he had an infected site from MRSA and he had COVID.  He was hospitalized. Since that time he is feeling much better.  He is working on Vegan diet, has only eaten meat once since admission.  He has lost weight but is feeling good with more energy.  He is his usual happy self for todays' visit.  He only requires refill of his Letemir today.  Is still sleeping well with Remeron.    Pending referrals to DE, Ortho, and Endocrine. Recently dx with COVID 23.  Was in hospital.   States needs refill of his Levemir.He is due for his diabetes yearly wellness exams including Fundus, Foot and Microalbumin.  His glucose is running fasting 130 or less and 2 hours post prandial 180 or less.  He is compliant with all medications.      23:  Follow-up 4 month. S/p LVAD insertion.  Is doing well. Did go take a walk yesterday and was wiped after.  Has not had A1c measured in system.  Glucose 117 on labs.  Tells me that his fasting sugars are  and post prandial are less than 180.  Is on Insulin at meal time and Lantus at night.  Compliant with meds and has had no episodes of hypoglycemia.    Last triglycerides over 300.  Not taking Lovaza as previously prescribed.  Has gained weight since last appointment.  Is trying to be more active.    Today c/o generalized joint pain in chest, fingers, knees.  Is scheduled to see palliative medicine provider in March and wife has tried to call her but no return call yet.  She will call again.      22:  FU after LVAD insertion.  Was going to have right heart cath and had seizure in parking lot prior to procedure.  He was told he had a stroke. NO providers want him to get cardiac rehab but wife  "unable to find any place locally for this.  He was told to see PCP for wellness visit. I referred him to diabetic education as per [facility] staff request but he has not gotten an appt. He was referred to Endocrine for possible Type 1 DM. States he sees NP in  for Endocrine.  I last saw pt in May.     Kevan is requesting a referral to Psychiatry.  He states that his doctors in [place] wanted him to see someone to talk to about his issues.  He states that it is although he feels happy to be alive, he knows that he has some agitation and anger that is controlling him and he feels they he wants somebody to talk to that is not family since they are not fully able to understand his situation.  He does not report any suicidal ideations.  States he has been through a lot and does not like having this LVAD attached to his body.  He does not feel like he has any quality of life currently.  He would just like to have a normal life for as long as that will be to spin with his children and his wife.  Would like a Dexcom but sees Endocrine soon. Takes insulin 3-4 times daily and also finger sticks 3-4 times a day currently.      5/31/22:  Pt here today after inpt stay in [place] for heart failure, was referred for a heart transplant.  Pt was dx with DM while inpatient and is in need of Diabetic Education.  Patient is noncompliant with clinic visits. Referred for heart transplant 4/11/22.  Appt 6/8/22 for transplant.        "He was been followed in clinic and underwent RHC 4/8/2022 with biventricular elevated filling pressures and echo showing EF 10%, LVEDD 70 mm, severe RVE and RVD with TAPSE 1.4, severe MR and subsequently admitted and discharged 4/25/2022 after aggressive diuresis. Advanced pathway was started"     Last glucose 5/27 was 191.  Ferritin normal at 150.   Pending referrals to DE, Ortho, and Endocrine. Last labs showed hgb 13.8/hct 39.8. BUN 26/creat 1.5 on 5/27/22 labs. Glucose 171 on " labs  Pt is c/o a pain in his throat deep in neck.  No redness or coughing or signs of strep noted.  Got tested for COVID prior to D/C from [place]. Denies any procedure being done there.  Wanted to take lozenges as wife just got over cold/sore throat but did not know if he could take them.   Pt is currently on Novolog 12 units TID, Detemir 23 units daily. He gets montly labs ordered.  QUINTIN 65 is pending.  Told he may have type 1 dm. Pending referral to Endocrine.  Left Shoulder pain secondary to device in hospital, pending referral to Ortho.  Anemia, stable on last draw. Iron stores were 243 in April this year.  Anxiety: pt has lots of anxiety related to health condition.  He was started on Lexapro but has not followed up with psych.  Remeron is helping for sleep at night.  He notes that he has issues and is requesting to be referred to someone to talk to, psychiatrist specifically.  He does not think Lexapro is helping anxiety and states Buspirone does not work either.                  ALLERGIES:   Review of patient's allergies indicates:   Allergen Reactions    Aspirin Other (See Comments)     Mr. Thacker is enrolled in Dr. Paige's Alon Trial and cannot have any aspirin and/or aspirin-containing products. DO NOT cancel any orders for INV Aspirin 100 mg/Placebo. If you have questions, please contact Isabel @ [phone], [phone],[email or web address], secure chat or MS Teams message.    Bumex [bumetanide] Hives    Lactose Diarrhea     Other reaction(s): Abdominal distension, gaseous    Torsemide Hives         ROS:  Review of Systems   Constitutional: Negative.    HENT: Negative.     Eyes: Negative.    Respiratory:  Positive for shortness of breath.    Cardiovascular: Negative.    Gastrointestinal: Negative.    Genitourinary: Negative.    Skin: Negative.    Neurological: Negative.    Psychiatric/Behavioral:  Negative for substance abuse and suicidal ideas. The patient is nervous/anxious and has insomnia.   "      OBJECTIVE DATA:  Vital signs  Vitals:    06/12/23 1300   Pulse: 61   Temp: 98 °F (36.7 °C)   TempSrc: Oral   SpO2: 99%   Weight: 100.4 kg (221 lb 6.4 oz)   Height: 6' 3" (1.905 m)      Body mass index is 27.67 kg/m².    PHYSICAL EXAM:   Physical Exam  Vitals reviewed.   Constitutional:       Appearance: Normal appearance.   HENT:      Head: Atraumatic.   Cardiovascular:      Rate and Rhythm: Normal rate and regular rhythm.      Pulses: Normal pulses.      Heart sounds: Heart sounds are distant.   Pulmonary:      Effort: Pulmonary effort is normal.      Breath sounds: Normal breath sounds.   Musculoskeletal:         General: Normal range of motion.      Cervical back: Normal range of motion.      Right lower leg: No edema.      Left lower leg: No edema.   Skin:     General: Skin is warm and dry.   Neurological:      General: No focal deficit present.      Mental Status: He is alert and oriented to person, place, and time.   Psychiatric:         Mood and Affect: Mood normal.         Behavior: Behavior normal.        ASSESSMENT/PLAN:  1. Type 2 diabetes mellitus with hyperglycemia, with long-term current use of insulin  Overview:  Lab Results   Component Value Date    HGBA1C 7.9 (H) 04/02/2023     POCT A1c today 7.5    Assessment & Plan:  Pending Endo appointment  Levemir 22units BID  novolog TID 16units with meals  Refer to eye clinic for diabetic fundus, unable to obtain in clinic.      POCT A1c today 7.5 in clinic.   Foot exam 6/12/23  Microalbumin 6/12/23  Referred to eye clinic for Fundus    Orders:  -     Microalbumin/Creatinine Ratio, Urine  -     Hemoglobin A1C, POCT  -     Ambulatory referral/consult to Ophthalmology; Future; Expected date: 06/19/2023  -     insulin detemir U-100, Levemir, 100 unit/mL (3 mL) SubQ InPn pen; Inject 22 Units into the skin 2 (two) times daily.  Dispense: 15 mL; Refill: 11    2. Insomnia, unspecified type  Assessment & Plan:  Continue Remeron  Avoid caffeine, alcohol and " stimulants. Do not use illicit drugs.  Practice positive phrases and repeat throughout the day.  Try yoga, lavender scents or Chamomile tea to promote relaxation.  Set healthy boundaries, avoid people and conversations that increase stress.  Avoid caffeinated beverages after lunch  Avoid alcohol near bedtime (eg, late afternoon and evening)  Avoid smoking or other nicotine intake, particularly during the evening  Exercise regularly for at least 20 minutes, preferably more than four to five hours prior to bedtime  Avoid daytime naps, especially if they are longer than 20 to 30 minutes or occur late in the day  Resolve concerns or worries before bedtime  Try not to force sleep    Sleep Hygiene Techniques: Sleep hygiene refers to actions that tend to improve and maintain good sleep  Power down electronic devices at least one hour prior to bedtime.  Keep room dark; use eye mask or relaxation sound machine to promote rest.  Sleep as long as necessary to feel rested (usually seven to eight hours for adults) and then get out of bed  Maintain a regular sleep schedule, particularly a regular wake-up time in the morning      3. Seizure-like activity  Overview:  37 year-old male who had LVAD placed on 6/29, and was sent from ICU to cardiac step-down unit yesterday. He was had two episodes of seizure-like activity. One yesterday, and one this morning. Both involve generalized convulsions and patient is unaware during these episodes. There is no associated tongue biting, eye deviation, loss of bowel or bladder control with either episode. There is no confusion after the episodes, and no preceding symptoms. His serum chemistries have shown hyponatremia, hypokalemia, and hypomagnesemia that have coincided with the seizure-like events. These are currently being corrected by the primary team. WBC abruptly increased from 7/17 to 7/18 (8 to 20.07). He was complaining of chills on 7/18, following inadvertently removing the LVAD drive  line. He was started on empiric vancomycin and cefepime. CT non-contrast of head on 7/19 showed no acute abnormality: there is a stable focus of hypo-attenuation along the left parasaggital occipital lobe. Differential includes seizure, myoclonus. Seizure-like activity may be due to electrolyte abnormalities. Cefepime, buspirone, may also be lowering the seizure threshold, contributing to these events. Recommend discontinuing the cefepime and buspirone, starting the patient on Keppra, and EEG monitoring >1 hr at this time. MRI contraindicated due to LVAD.    Recommendations  -discontinue cefepime, buspirone: lowers seizure threshold  -Keppra: 2000mg loading dose, followed by 1000mg BID  -EEG monitoring >1 hr  -no MRI at this time due to LVAD    Assessment & Plan:  Compliant with Keprra use daily. Seems to still be taking Buspirone.        4. Anxiety disorder, unspecified type  Assessment & Plan:  Continue Lexapro chronic, stable  Practice deep breathing or abdominal breathing exercises when anxiety occurs.  Exercise daily. Get sunlight daily.  Avoid caffeine, alcohol and stimulants.  Practice positive phrases and repeat throughout the day, yoga, lavender scents or Chamomile tea will help anxiety.  Set healthy boundaries, avoid people and conversations that increase stress.  Reports any symptoms of suicidal or homicidal ideations immediately, if clinic is closed go to nearest emergency room.          5. LVAD (left ventricular assist device) present    6. Anemia of chronic disease  Overview:  Lab Results   Component Value Date    HGB 13.6 (L) 06/06/2023       Lab Results   Component Value Date    HCT 43.2 06/06/2023       Lab Results   Component Value Date    IRON <10 (L) 06/03/2023    TIBC 508 (H) 06/03/2023    FERRITIN 360 (H) 06/05/2023         Assessment & Plan:  H/H stable last draw             RESULTS:  Recent Results (from the past 1008 hour(s))   HEART VAD LABS (EXTERNAL)    Collection Time: 05/05/23 12:00 AM    Result Value Ref Range    EXT WBC 8.3     EXT Hemoglobin 12.2     EXT Hematocrit 38.3     EXT Platelets 248     EXT Glucose 215     EXT BUN 10     EXT Creatinine 1.21 mg/dL    EXT Sodium 139 mmol/L    EXT Potassium 5.1     EXT Chloride 104     EXT Calcium 9.2     EXT Magnesium 1.8     EXT Albumin 4.2     EXT Protein total 7.4     EXT AST 28     EXT ALT 26     EXT BilirubiN Total 0.6     EXT     Prothrombin w/ INR and PTT    Collection Time: 05/12/23 12:00 AM   Result Value Ref Range    INR 2.1    HEART VAD LABS (EXTERNAL)    Collection Time: 05/16/23 12:00 AM   Result Value Ref Range    EXT WBC 14.9     EXT Hemoglobin 13.5     EXT Hematocrit 43.2     EXT Platelets 289     EXT Glucose 124     EXT BUN 10     EXT Creatinine 1.35 mg/dL    EXT Sodium 142 mmol/L    EXT Potassium 4.9     EXT Chloride 104     EXT Calcium 9.1     EXT Magnesium 1.8     EXT Albumin 4.3     EXT Protein total 7.6     EXT AST 24     EXT ALT 17     EXT BilirubiN Total 0.5     EXT     Prothrombin w/ INR and PTT    Collection Time: 05/16/23 12:00 AM   Result Value Ref Range    INR 1.7    Prothrombin w/ INR and PTT    Collection Time: 05/25/23 12:00 AM   Result Value Ref Range    INR 1.9    COVID/FLU A&B PCR    Collection Time: 06/02/23  4:17 PM   Result Value Ref Range    Influenza A PCR Not Detected Not Detected    Influenza B PCR Not Detected Not Detected    SARS-CoV-2 PCR Detected (A) Not Detected, Negative, Invalid   CBC with Differential    Collection Time: 06/02/23  4:51 PM   Result Value Ref Range    WBC 13.06 (H) 4.50 - 11.50 x10(3)/mcL    RBC 5.29 4.70 - 6.10 x10(6)/mcL    Hgb 13.3 (L) 14.0 - 18.0 g/dL    Hct 41.2 (L) 42.0 - 52.0 %    MCV 77.9 (L) 80.0 - 94.0 fL    MCH 25.1 (L) 27.0 - 31.0 pg    MCHC 32.3 (L) 33.0 - 36.0 g/dL    RDW 21.8 (H) 11.5 - 17.0 %    Platelet 238 130 - 400 x10(3)/mcL    MPV 9.9 7.4 - 10.4 fL    Neut % 79.8 %    Lymph % 10.6 %    Mono % 8.9 %    Eos % 0.0 %    Basophil % 0.3 %    Lymph # 1.38 0.6 -  4.6 x10(3)/mcL    Neut # 10.43 (H) 2.1 - 9.2 x10(3)/mcL    Mono # 1.16 0.1 - 1.3 x10(3)/mcL    Eos # 0.00 0 - 0.9 x10(3)/mcL    Baso # 0.04 <=0.2 x10(3)/mcL    IG# 0.05 (H) 0 - 0.04 x10(3)/mcL    IG% 0.4 %    NRBC% 0.0 %   Blood culture #1 **CANNOT BE ORDERED STAT**    Collection Time: 06/02/23  4:51 PM    Specimen: Blood   Result Value Ref Range    CULTURE, BLOOD ([facility]) No Growth at 5 days    Blood culture #2 **CANNOT BE ORDERED STAT**    Collection Time: 06/02/23  4:51 PM    Specimen: Blood   Result Value Ref Range    CULTURE, BLOOD ([facility]) No Growth at 5 days    Comprehensive Metabolic Panel    Collection Time: 06/02/23  4:52 PM   Result Value Ref Range    Sodium Level 132 (L) 136 - 145 mmol/L    Potassium Level 4.1 3.5 - 5.1 mmol/L    Chloride 101 98 - 107 mmol/L    Carbon Dioxide 21 (L) 22 - 29 mmol/L    Glucose Level 186 (H) 74 - 100 mg/dL    Blood Urea Nitrogen 7.2 (L) 8.9 - 20.6 mg/dL    Creatinine 1.65 (H) 0.73 - 1.18 mg/dL    Calcium Level Total 8.9 8.4 - 10.2 mg/dL    Protein Total 8.2 6.4 - 8.3 gm/dL    Albumin Level 4.0 3.5 - 5.0 g/dL    Globulin 4.2 (H) 2.4 - 3.5 gm/dL    Albumin/Globulin Ratio 1.0 (L) 1.1 - 2.0 ratio    Bilirubin Total 1.0 <=1.5 mg/dL    Alkaline Phosphatase 101 40 - 150 unit/L    Alanine Aminotransferase 10 0 - 55 unit/L    Aspartate Aminotransferase 22 5 - 34 unit/L    eGFR 54 mls/min/1.73/m2   Lactic Acid, Plasma    Collection Time: 06/02/23  4:52 PM   Result Value Ref Range    Lactic Acid Level 1.9 0.5 - 2.2 mmol/L   Lipase    Collection Time: 06/02/23  4:52 PM   Result Value Ref Range    Lipase Level 15 <=60 U/L   Blood Culture    Collection Time: 06/02/23  5:00 PM    Specimen: PICC Line; Blood   Result Value Ref Range    CULTURE, BLOOD ([facility]) No Growth at 5 days    Urinalysis, Reflex to Urine Culture    Collection Time: 06/02/23  6:19 PM    Specimen: Urine   Result Value Ref Range    Color, UA Yellow Yellow, Light-Yellow, Dark Yellow, Leyla, Straw    Appearance, UA Clear Clear     Specific Gravity, UA 1.004 (L) 1.005 - 1.030    pH, UA 7.0 5.0 - 8.5    Protein, UA 2+ (A) Negative mg/dL    Glucose, UA Negative Negative, Normal mg/dL    Ketones, UA Negative Negative mg/dL    Blood, UA Trace (A) Negative unit/L    Bilirubin, UA Negative Negative mg/dL    Urobilinogen, UA 1.0 0.2, 1.0, Normal mg/dL    Nitrites, UA Negative Negative    Leukocyte Esterase, UA Negative Negative unit/L   Urinalysis, Microscopic    Collection Time: 06/02/23  6:19 PM   Result Value Ref Range    RBC, UA <5 <=5 /HPF    WBC, UA <5 <=5 /HPF    Squamous Epithelial Cells, UA <5 <=5 /HPF    Bacteria, UA None Seen None Seen, Rare, Occasional /HPF   POCT glucose    Collection Time: 06/02/23 11:39 PM   Result Value Ref Range    POCT Glucose 144 (H) 70 - 110 mg/dL   PTT    Collection Time: 06/03/23 12:40 AM   Result Value Ref Range    aPTT 36.6 (H) 21.0 - 32.0 sec   Sedimentation rate    Collection Time: 06/03/23 12:40 AM   Result Value Ref Range    Sed Rate 97 (H) 0 - 23 mm/Hr   CK    Collection Time: 06/03/23 12:40 AM   Result Value Ref Range     (H) 20 - 200 U/L   Ferritin    Collection Time: 06/03/23 12:40 AM   Result Value Ref Range    Ferritin 97 20.0 - 300.0 ng/mL   Troponin I    Collection Time: 06/03/23 12:40 AM   Result Value Ref Range    Troponin I 0.166 (H) 0.000 - 0.026 ng/mL   D-Dimer, Quantitative    Collection Time: 06/03/23 12:40 AM   Result Value Ref Range    D-Dimer 1.29 (H) <0.50 mg/L FEU   Lactic Acid, Plasma    Collection Time: 06/03/23 12:40 AM   Result Value Ref Range    Lactate (Lactic Acid) 2.2 0.5 - 2.2 mmol/L   Blood culture    Collection Time: 06/03/23 12:40 AM    Specimen: Blood   Result Value Ref Range    Blood Culture, Routine No growth after 5 days.    Blood culture    Collection Time: 06/03/23 12:40 AM    Specimen: Blood   Result Value Ref Range    Blood Culture, Routine No growth after 5 days.    Iron and TIBC    Collection Time: 06/03/23 12:40 AM   Result Value Ref Range    Iron <10 (L)  45 - 160 ug/dL    Transferrin 343 200 - 375 mg/dL    TIBC 508 (H) 250 - 450 ug/dL    Saturated Iron Unable to calculate 20 - 50 %   Culture, Anaerobe    Collection Time: 06/03/23  1:35 AM    Specimen: Abdomen; Skin   Result Value Ref Range    Anaerobic Culture No anaerobes isolated    Aerobic culture    Collection Time: 06/03/23  1:35 AM    Specimen: Abdomen; Skin   Result Value Ref Range    Aerobic Bacterial Culture STAPHYLOCOCCUS AUREUS  Moderate   (A)        Susceptibility    Staphylococcus aureus - CULTURE, AEROBIC  (SPECIFY SOURCE)     Clindamycin <=0.5 Sensitive mcg/mL     Erythromycin >4 Resistant mcg/mL     Oxacillin 0.5 Sensitive mcg/mL     Penicillin >8 Resistant mcg/mL     Trimeth/Sulfa <=0.5/9.5 Sensitive mcg/mL     Tetracycline <=4 Sensitive mcg/mL   Toxicology screen, urine    Collection Time: 06/03/23  2:06 AM   Result Value Ref Range    Alcohol, Urine <10 <10 mg/dL    Benzodiazepines Negative Negative    Methadone metabolites Negative Negative    Cocaine (Metab.) Negative Negative    Opiate Scrn, Ur Negative Negative    Barbiturate Screen, Ur Negative Negative    Amphetamine Screen, Ur Negative Negative    THC Negative Negative    Phencyclidine Negative Negative    Creatinine, Urine 79.0 23.0 - 375.0 mg/dL    Toxicology Information SEE COMMENT    CBC auto differential    Collection Time: 06/03/23  5:32 AM   Result Value Ref Range    WBC 12.69 3.90 - 12.70 K/uL    RBC 4.92 4.60 - 6.20 M/uL    Hemoglobin 12.1 (L) 14.0 - 18.0 g/dL    Hematocrit 39.4 (L) 40.0 - 54.0 %    MCV 80 (L) 82 - 98 fL    MCH 24.6 (L) 27.0 - 31.0 pg    MCHC 30.7 (L) 32.0 - 36.0 g/dL    RDW 21.8 (H) 11.5 - 14.5 %    Platelets 223 150 - 450 K/uL    MPV 9.9 9.2 - 12.9 fL    Immature Granulocytes 0.2 0.0 - 0.5 %    Gran # (ANC) 9.5 (H) 1.8 - 7.7 K/uL    Immature Grans (Abs) 0.03 0.00 - 0.04 K/uL    Lymph # 1.9 1.0 - 4.8 K/uL    Mono # 1.2 (H) 0.3 - 1.0 K/uL    Eos # 0.0 0.0 - 0.5 K/uL    Baso # 0.04 0.00 - 0.20 K/uL    nRBC 0 0 /100  WBC    Gran % 74.6 (H) 38.0 - 73.0 %    Lymph % 15.1 (L) 18.0 - 48.0 %    Mono % 9.7 4.0 - 15.0 %    Eosinophil % 0.1 0.0 - 8.0 %    Basophil % 0.3 0.0 - 1.9 %    Differential Method Automated    Basic metabolic panel    Collection Time: 06/03/23  5:32 AM   Result Value Ref Range    Sodium 135 (L) 136 - 145 mmol/L    Potassium 3.9 3.5 - 5.1 mmol/L    Chloride 103 95 - 110 mmol/L    CO2 21 (L) 23 - 29 mmol/L    Glucose 105 70 - 110 mg/dL    BUN 8 6 - 20 mg/dL    Creatinine 1.4 0.5 - 1.4 mg/dL    Calcium 8.7 8.7 - 10.5 mg/dL    Anion Gap 11 8 - 16 mmol/L    eGFR >60.0 >60 mL/min/1.73 m^2   Magnesium    Collection Time: 06/03/23  5:32 AM   Result Value Ref Range    Magnesium 1.8 1.6 - 2.6 mg/dL   Phosphorus    Collection Time: 06/03/23  5:32 AM   Result Value Ref Range    Phosphorus 2.9 2.7 - 4.5 mg/dL   APTT    Collection Time: 06/03/23  5:32 AM   Result Value Ref Range    aPTT 37.5 (H) 21.0 - 32.0 sec   Protime-INR    Collection Time: 06/03/23  5:32 AM   Result Value Ref Range    Prothrombin Time 20.7 (H) 9.0 - 12.5 sec    INR 2.0 (H) 0.8 - 1.2   Lactate dehydrogenase    Collection Time: 06/03/23  5:32 AM   Result Value Ref Range     110 - 260 U/L   POCT glucose    Collection Time: 06/03/23  8:53 AM   Result Value Ref Range    POCT Glucose 131 (H) 70 - 110 mg/dL   POCT glucose    Collection Time: 06/03/23 11:56 AM   Result Value Ref Range    POCT Glucose 136 (H) 70 - 110 mg/dL   POCT glucose    Collection Time: 06/03/23  5:03 PM   Result Value Ref Range    POCT Glucose 258 (H) 70 - 110 mg/dL   POCT glucose    Collection Time: 06/03/23  8:06 PM   Result Value Ref Range    POCT Glucose 303 (H) 70 - 110 mg/dL   CBC auto differential    Collection Time: 06/04/23  5:13 AM   Result Value Ref Range    WBC 14.22 (H) 3.90 - 12.70 K/uL    RBC 5.37 4.60 - 6.20 M/uL    Hemoglobin 13.2 (L) 14.0 - 18.0 g/dL    Hematocrit 40.9 40.0 - 54.0 %    MCV 76 (L) 82 - 98 fL    MCH 24.6 (L) 27.0 - 31.0 pg    MCHC 32.3 32.0 - 36.0 g/dL     RDW 21.8 (H) 11.5 - 14.5 %    Platelets 250 150 - 450 K/uL    MPV 9.3 9.2 - 12.9 fL    Immature Granulocytes 0.4 0.0 - 0.5 %    Gran # (ANC) 12.5 (H) 1.8 - 7.7 K/uL    Immature Grans (Abs) 0.05 (H) 0.00 - 0.04 K/uL    Lymph # 1.0 1.0 - 4.8 K/uL    Mono # 0.6 0.3 - 1.0 K/uL    Eos # 0.0 0.0 - 0.5 K/uL    Baso # 0.03 0.00 - 0.20 K/uL    nRBC 0 0 /100 WBC    Gran % 88.2 (H) 38.0 - 73.0 %    Lymph % 7.1 (L) 18.0 - 48.0 %    Mono % 4.1 4.0 - 15.0 %    Eosinophil % 0.0 0.0 - 8.0 %    Basophil % 0.2 0.0 - 1.9 %    Differential Method Automated    Basic metabolic panel    Collection Time: 06/04/23  5:13 AM   Result Value Ref Range    Sodium 133 (L) 136 - 145 mmol/L    Potassium 4.6 3.5 - 5.1 mmol/L    Chloride 102 95 - 110 mmol/L    CO2 21 (L) 23 - 29 mmol/L    Glucose 247 (H) 70 - 110 mg/dL    BUN 11 6 - 20 mg/dL    Creatinine 1.4 0.5 - 1.4 mg/dL    Calcium 9.5 8.7 - 10.5 mg/dL    Anion Gap 10 8 - 16 mmol/L    eGFR >60.0 >60 mL/min/1.73 m^2   Magnesium    Collection Time: 06/04/23  5:13 AM   Result Value Ref Range    Magnesium 2.1 1.6 - 2.6 mg/dL   Phosphorus    Collection Time: 06/04/23  5:13 AM   Result Value Ref Range    Phosphorus 2.0 (L) 2.7 - 4.5 mg/dL   APTT    Collection Time: 06/04/23  5:13 AM   Result Value Ref Range    aPTT 39.6 (H) 21.0 - 32.0 sec   Protime-INR    Collection Time: 06/04/23  5:13 AM   Result Value Ref Range    Prothrombin Time 28.8 (H) 9.0 - 12.5 sec    INR 2.9 (H) 0.8 - 1.2   Lactate dehydrogenase    Collection Time: 06/04/23  5:13 AM   Result Value Ref Range     (H) 110 - 260 U/L   POCT glucose    Collection Time: 06/04/23  8:24 AM   Result Value Ref Range    POCT Glucose 179 (H) 70 - 110 mg/dL   VANCOMYCIN, TROUGH    Collection Time: 06/04/23  8:46 AM   Result Value Ref Range    Vancomycin-Trough 11.8 10.0 - 22.0 ug/mL   POCT glucose    Collection Time: 06/04/23 12:33 PM   Result Value Ref Range    POCT Glucose 161 (H) 70 - 110 mg/dL   POCT glucose    Collection Time: 06/04/23  4:32  PM   Result Value Ref Range    POCT Glucose 169 (H) 70 - 110 mg/dL   Basic metabolic panel    Collection Time: 06/04/23  4:38 PM   Result Value Ref Range    Sodium 137 136 - 145 mmol/L    Potassium 4.3 3.5 - 5.1 mmol/L    Chloride 102 95 - 110 mmol/L    CO2 25 23 - 29 mmol/L    Glucose 200 (H) 70 - 110 mg/dL    BUN 13 6 - 20 mg/dL    Creatinine 1.3 0.5 - 1.4 mg/dL    Calcium 9.4 8.7 - 10.5 mg/dL    Anion Gap 10 8 - 16 mmol/L    eGFR >60.0 >60 mL/min/1.73 m^2   POCT glucose    Collection Time: 06/04/23  9:01 PM   Result Value Ref Range    POCT Glucose 195 (H) 70 - 110 mg/dL   Ferritin    Collection Time: 06/05/23 12:21 AM   Result Value Ref Range    Ferritin 360 (H) 20.0 - 300.0 ng/mL   D-Dimer, Quantitative    Collection Time: 06/05/23 12:21 AM   Result Value Ref Range    D-Dimer 1.32 (H) <0.50 mg/L FEU   CBC auto differential    Collection Time: 06/05/23  4:18 AM   Result Value Ref Range    WBC 16.96 (H) 3.90 - 12.70 K/uL    RBC 5.12 4.60 - 6.20 M/uL    Hemoglobin 12.6 (L) 14.0 - 18.0 g/dL    Hematocrit 38.8 (L) 40.0 - 54.0 %    MCV 76 (L) 82 - 98 fL    MCH 24.6 (L) 27.0 - 31.0 pg    MCHC 32.5 32.0 - 36.0 g/dL    RDW 21.5 (H) 11.5 - 14.5 %    Platelets 249 150 - 450 K/uL    MPV 9.5 9.2 - 12.9 fL    Immature Granulocytes 0.4 0.0 - 0.5 %    Gran # (ANC) 14.0 (H) 1.8 - 7.7 K/uL    Immature Grans (Abs) 0.07 (H) 0.00 - 0.04 K/uL    Lymph # 2.1 1.0 - 4.8 K/uL    Mono # 0.7 0.3 - 1.0 K/uL    Eos # 0.0 0.0 - 0.5 K/uL    Baso # 0.04 0.00 - 0.20 K/uL    nRBC 0 0 /100 WBC    Gran % 82.4 (H) 38.0 - 73.0 %    Lymph % 12.6 (L) 18.0 - 48.0 %    Mono % 4.3 4.0 - 15.0 %    Eosinophil % 0.1 0.0 - 8.0 %    Basophil % 0.2 0.0 - 1.9 %    Differential Method Automated    APTT    Collection Time: 06/05/23  4:18 AM   Result Value Ref Range    aPTT 34.7 (H) 21.0 - 32.0 sec   Protime-INR    Collection Time: 06/05/23  4:18 AM   Result Value Ref Range    Prothrombin Time 26.5 (H) 9.0 - 12.5 sec    INR 2.6 (H) 0.8 - 1.2   Lactate dehydrogenase     Collection Time: 06/05/23  4:18 AM   Result Value Ref Range     110 - 260 U/L   Brain natriuretic peptide    Collection Time: 06/05/23  4:18 AM   Result Value Ref Range    BNP 52 0 - 99 pg/mL   C-reactive protein    Collection Time: 06/05/23  4:18 AM   Result Value Ref Range    CRP 37.7 (H) 0.0 - 8.2 mg/L   Prealbumin    Collection Time: 06/05/23  4:18 AM   Result Value Ref Range    Prealbumin 17 (L) 20 - 43 mg/dL   Hepatic function panel    Collection Time: 06/05/23  4:18 AM   Result Value Ref Range    Total Protein 7.4 6.0 - 8.4 g/dL    Albumin 3.1 (L) 3.5 - 5.2 g/dL    Total Bilirubin 0.5 0.1 - 1.0 mg/dL    Bilirubin, Direct 0.3 0.1 - 0.3 mg/dL    AST 35 10 - 40 U/L    ALT 26 10 - 44 U/L    Alkaline Phosphatase 84 55 - 135 U/L   Magnesium    Collection Time: 06/05/23  4:19 AM   Result Value Ref Range    Magnesium 1.8 1.6 - 2.6 mg/dL   Phosphorus    Collection Time: 06/05/23  4:19 AM   Result Value Ref Range    Phosphorus 3.5 2.7 - 4.5 mg/dL   Comprehensive metabolic panel    Collection Time: 06/05/23  4:19 AM   Result Value Ref Range    Sodium 135 (L) 136 - 145 mmol/L    Potassium 3.8 3.5 - 5.1 mmol/L    Chloride 99 95 - 110 mmol/L    CO2 28 23 - 29 mmol/L    Glucose 210 (H) 70 - 110 mg/dL    BUN 11 6 - 20 mg/dL    Creatinine 1.2 0.5 - 1.4 mg/dL    Calcium 9.3 8.7 - 10.5 mg/dL    Total Protein 7.5 6.0 - 8.4 g/dL    Albumin 3.1 (L) 3.5 - 5.2 g/dL    Total Bilirubin 0.5 0.1 - 1.0 mg/dL    Alkaline Phosphatase 82 55 - 135 U/L    AST 37 10 - 40 U/L    ALT 24 10 - 44 U/L    Anion Gap 8 8 - 16 mmol/L    eGFR >60.0 >60 mL/min/1.73 m^2   POCT glucose    Collection Time: 06/05/23  9:18 AM   Result Value Ref Range    POCT Glucose 118 (H) 70 - 110 mg/dL   POCT glucose    Collection Time: 06/05/23 11:34 AM   Result Value Ref Range    POCT Glucose 173 (H) 70 - 110 mg/dL   Blood culture    Collection Time: 06/05/23  1:19 PM    Specimen: Blood   Result Value Ref Range    Blood Culture, Routine No growth after 5 days.     Blood culture    Collection Time: 06/05/23  1:29 PM    Specimen: Blood   Result Value Ref Range    Blood Culture, Routine No growth after 5 days.    POCT glucose    Collection Time: 06/05/23  4:43 PM   Result Value Ref Range    POCT Glucose 178 (H) 70 - 110 mg/dL   POCT glucose    Collection Time: 06/05/23  9:56 PM   Result Value Ref Range    POCT Glucose 195 (H) 70 - 110 mg/dL   CBC auto differential    Collection Time: 06/06/23  6:55 AM   Result Value Ref Range    WBC 10.96 3.90 - 12.70 K/uL    RBC 5.53 4.60 - 6.20 M/uL    Hemoglobin 13.6 (L) 14.0 - 18.0 g/dL    Hematocrit 43.2 40.0 - 54.0 %    MCV 78 (L) 82 - 98 fL    MCH 24.6 (L) 27.0 - 31.0 pg    MCHC 31.5 (L) 32.0 - 36.0 g/dL    RDW 22.3 (H) 11.5 - 14.5 %    Platelets 302 150 - 450 K/uL    MPV 9.9 9.2 - 12.9 fL    Immature Granulocytes 0.4 0.0 - 0.5 %    Gran # (ANC) 6.5 1.8 - 7.7 K/uL    Immature Grans (Abs) 0.04 0.00 - 0.04 K/uL    Lymph # 3.7 1.0 - 4.8 K/uL    Mono # 0.7 0.3 - 1.0 K/uL    Eos # 0.1 0.0 - 0.5 K/uL    Baso # 0.05 0.00 - 0.20 K/uL    nRBC 0 0 /100 WBC    Gran % 59.0 38.0 - 73.0 %    Lymph % 33.3 18.0 - 48.0 %    Mono % 6.2 4.0 - 15.0 %    Eosinophil % 0.6 0.0 - 8.0 %    Basophil % 0.5 0.0 - 1.9 %    Differential Method Automated    Magnesium    Collection Time: 06/06/23  6:55 AM   Result Value Ref Range    Magnesium 1.8 1.6 - 2.6 mg/dL   APTT    Collection Time: 06/06/23  6:55 AM   Result Value Ref Range    aPTT 32.4 (H) 21.0 - 32.0 sec   Protime-INR    Collection Time: 06/06/23  6:55 AM   Result Value Ref Range    Prothrombin Time 22.1 (H) 9.0 - 12.5 sec    INR 2.2 (H) 0.8 - 1.2   Lactate dehydrogenase    Collection Time: 06/06/23  6:55 AM   Result Value Ref Range     (H) 110 - 260 U/L   Comprehensive metabolic panel    Collection Time: 06/06/23  6:55 AM   Result Value Ref Range    Sodium 137 136 - 145 mmol/L    Potassium 4.3 3.5 - 5.1 mmol/L    Chloride 103 95 - 110 mmol/L    CO2 26 23 - 29 mmol/L    Glucose 121 (H) 70 - 110 mg/dL     BUN 9 6 - 20 mg/dL    Creatinine 1.2 0.5 - 1.4 mg/dL    Calcium 9.1 8.7 - 10.5 mg/dL    Total Protein 7.5 6.0 - 8.4 g/dL    Albumin 3.2 (L) 3.5 - 5.2 g/dL    Total Bilirubin 0.5 0.1 - 1.0 mg/dL    Alkaline Phosphatase 85 55 - 135 U/L    AST 40 10 - 40 U/L    ALT 26 10 - 44 U/L    Anion Gap 8 8 - 16 mmol/L    eGFR >60.0 >60 mL/min/1.73 m^2   POCT glucose    Collection Time: 06/06/23  7:25 AM   Result Value Ref Range    POCT Glucose 118 (H) 70 - 110 mg/dL   POCT glucose    Collection Time: 06/06/23 11:23 AM   Result Value Ref Range    POCT Glucose 117 (H) 70 - 110 mg/dL   POCT glucose    Collection Time: 06/06/23  3:45 PM   Result Value Ref Range    POCT Glucose 165 (H) 70 - 110 mg/dL   POCT glucose    Collection Time: 06/06/23  9:54 PM   Result Value Ref Range    POCT Glucose 93 70 - 110 mg/dL   Microalbumin/Creatinine Ratio, Urine    Collection Time: 06/12/23  1:45 PM   Result Value Ref Range    Urine Microalbumin 495.8 (H) <=30.0 ug/ml    Urine Creatinine 197.5 (H) 63.0 - 166.0 mg/dL    Microalbumin Creatinine Ratio 251.0 (H) 0.0 - 30.0 mg/gm Cr   Hemoglobin A1C, POCT    Collection Time: 06/12/23  1:45 PM   Result Value Ref Range    Hemoglobin A1C, POC 7.5 %         Follow Up:  No follow-ups on file.             This note was created with the assistance of a voice recognition software or phone dictation. There may be transcription errors as a result of using this technology however minimal. Effort has been made to assure accuracy of transcription but any obvious errors or omissions should be clarified with the author of the document

## 2023-06-15 ENCOUNTER — DOCUMENTATION ONLY (OUTPATIENT)
Dept: TRANSPLANT | Facility: CLINIC | Age: 38
End: 2023-06-15
Payer: MEDICAID

## 2023-06-15 ENCOUNTER — ANTI-COAG VISIT (OUTPATIENT)
Dept: CARDIOLOGY | Facility: CLINIC | Age: 38
End: 2023-06-15
Payer: MEDICAID

## 2023-06-15 DIAGNOSIS — Z95.811 LVAD (LEFT VENTRICULAR ASSIST DEVICE) PRESENT: Primary | ICD-10-CM

## 2023-06-15 LAB
EXT ALBUMIN: 4
EXT ALT: 80
EXT AST: 81
EXT BILIRUBIN TOTAL: 0.6
EXT BUN: 12
EXT CALCIUM: 9.4
EXT CHLORIDE: 103
EXT CREATININE: 1.12 MG/DL
EXT GLUCOSE: 118
EXT HEMATOCRIT: 43.9
EXT HEMOGLOBIN: 13.9
EXT MAGNESIUM: 2
EXT PLATELETS: 307
EXT POTASSIUM: 5
EXT PROTEIN TOTAL: 7.6
EXT SODIUM: 136 MMOL/L
EXT WBC: 8.7
INR PPP: 3.5

## 2023-06-15 PROCEDURE — 93793 PR ANTICOAGULANT MGMT FOR PT TAKING WARFARIN: ICD-10-PCS | Mod: ,,,

## 2023-06-15 PROCEDURE — 93793 ANTICOAG MGMT PT WARFARIN: CPT | Mod: ,,,

## 2023-06-15 RX ORDER — WARFARIN 3 MG/1
4.5-6 TABLET ORAL DAILY
Qty: 60 TABLET | Refills: 5 | Status: ON HOLD | OUTPATIENT
Start: 2023-06-15 | End: 2023-08-08 | Stop reason: SDUPTHER

## 2023-06-15 NOTE — PROGRESS NOTES
Deep declined 6/19/23 INR date stating [facility] will only let them come on Tuesday's for INR's. Deep requested for patient to have INR at [facility] on 6/20/23.

## 2023-06-15 NOTE — PROGRESS NOTES
Darlyn reports correct Warfarin dose, stated patient has been taking Doxycycline 100mg BID since 6/6/23 for COVID and has a few days left to take the antibiotics, no other changes reported.

## 2023-06-15 NOTE — PROGRESS NOTES
Reviewed patient's labs and spoke with Dr. Crum, patient's AST/ALT is elevated, patient recently had COVID, will repeat labs next week. Patient's K is also at 5, MD ordered to drop K to 60 meq Daily. Communicated information to wife Robyn who verbalized understanding and agreement. She also stated that patient is having trouble with levimir prescription, instructed to reach out to PCP who manages this and pharmacy to communicate issue. Also having trouble with coumadin refill, message sent to coumadin clinic for refill.

## 2023-06-16 ENCOUNTER — PATIENT MESSAGE (OUTPATIENT)
Dept: ADMINISTRATIVE | Facility: OTHER | Age: 38
End: 2023-06-16
Payer: MEDICAID

## 2023-06-22 ENCOUNTER — TELEPHONE (OUTPATIENT)
Dept: TRANSPLANT | Facility: CLINIC | Age: 38
End: 2023-06-22
Payer: MEDICAID

## 2023-06-22 LAB — INR PPP: 1.1

## 2023-06-22 NOTE — TELEPHONE ENCOUNTER
CONTACTED [facility] IN EFFORTS TO OBTAIN JOSE J DAMICO CMP RESULTS FROM THIS WEEK    WAS INFORMED PATIENT HAS YET TO VISIT FACILITY AS OF YET. THIS INFORMATION WILL BE RELAYED TO VAD COORDINATOR.

## 2023-06-22 NOTE — TELEPHONE ENCOUNTER
Reports bp was 236/194 w/ wrist auto cuff when patient came for labs. Unable to obtain manual bp. No alarms on VAD. States she instructed patient to go home and check bp again then call this office. Is not sure if patient was pulsatile. Explained that it is very unlikely that this was the patients true bp but will check on patient.  Spoke with s/o Page, not able to check bp right now but pt reports feeling well, no alarms on VAD, flow 3.5.

## 2023-06-23 ENCOUNTER — ANTI-COAG VISIT (OUTPATIENT)
Dept: CARDIOLOGY | Facility: CLINIC | Age: 38
End: 2023-06-23
Payer: MEDICAID

## 2023-06-23 ENCOUNTER — TELEPHONE (OUTPATIENT)
Dept: TRANSPLANT | Facility: CLINIC | Age: 38
End: 2023-06-23
Payer: MEDICAID

## 2023-06-23 DIAGNOSIS — Z95.811 LVAD (LEFT VENTRICULAR ASSIST DEVICE) PRESENT: Primary | ICD-10-CM

## 2023-06-23 PROCEDURE — 93793 PR ANTICOAGULANT MGMT FOR PT TAKING WARFARIN: ICD-10-PCS | Mod: ,,,

## 2023-06-23 PROCEDURE — 93793 ANTICOAG MGMT PT WARFARIN: CPT | Mod: ,,,

## 2023-06-23 NOTE — PROGRESS NOTES
Darlyn Wright advised of dose instructions and verbalized understanding.  Patient declined labs Monday rescheduled appointment from 6/26/23  to 6/29/23 at [facility] .

## 2023-06-23 NOTE — PROGRESS NOTES
Spoke with patient regarding recent INR results .  Patient wife Darlyn Wright questioned and verified correct dosage . Reported patient coumadin was on back order and he missed four days of taking warfarin  . He received his RX yesterday 6/22/23 and taken 6 mg on his own no other changes reported.

## 2023-06-23 NOTE — TELEPHONE ENCOUNTER
Contacted  [facility] [phone] f- [phone]  To obtain lab results for patient Kevan Queen  Spoke with Janie  Provided fax number  Awaiting lab results.

## 2023-06-26 ENCOUNTER — TELEPHONE (OUTPATIENT)
Dept: TRANSPLANT | Facility: CLINIC | Age: 38
End: 2023-06-26
Payer: MEDICAID

## 2023-06-26 NOTE — TELEPHONE ENCOUNTER
----- Message from Jelly Hummel LPN sent at 6/16/2023 10:06 AM CDT -----  Regarding: cmp elevated Liver enzymes & K+  To be drawn on 6/19/23   [facility] (may/may not received results same day) / Every monday labs  [facility] ph [phone] f- [phone]    Lab order faxed to [facility] and scheduled with [facility]

## 2023-06-26 NOTE — TELEPHONE ENCOUNTER
----- Message from Jelly Hummel LPN sent at 6/16/2023 10:06 AM CDT -----  Regarding: cmp elevated Liver enzymes & K+  To be drawn on 6/19/23   [facility] (may/may not received results same day) / Every monday labs  [facility] ph [phone] f- [phone]    Lab order faxed to [facility] and scheduled with [facility]

## 2023-06-27 ENCOUNTER — TELEPHONE (OUTPATIENT)
Dept: TRANSPLANT | Facility: CLINIC | Age: 38
End: 2023-06-27
Payer: MEDICAID

## 2023-06-27 NOTE — TELEPHONE ENCOUNTER
Attempted to speak with patient regarding equipment maintenance due at upcoming clinic appointment on 7/5/2023. Left MyChart message asking patient to bring UBC and emergency bag with them to next appointment for maintenance.    It is medically necessary to ensure patient has properly functioning equipment and wearables to prevent infection, injury or death to patient.

## 2023-06-30 LAB
EXT ALBUMIN: 3.8
EXT ALT: 15
EXT AST: 21
EXT BILIRUBIN TOTAL: 0.5
EXT BUN: 9
EXT CALCIUM: 9.1
EXT CHLORIDE: 107
EXT CREATININE: 1.13 MG/DL
EXT GLUCOSE: 131
EXT HEMATOCRIT: 39.6
EXT HEMOGLOBIN: 12.6
EXT MAGNESIUM: 1.9
EXT PLATELETS: 210
EXT POTASSIUM: 4.4
EXT PROTEIN TOTAL: 6.5
EXT SODIUM: 138 MMOL/L
EXT WBC: 6.2
INR PPP: 2.3

## 2023-06-30 NOTE — PROGRESS NOTES
[facility] ph [phone] - [phone] regarding pt labs they report he moved his visit from 6/29 to 6/30 . May not get lab results until monday 7/3/23

## 2023-07-03 ENCOUNTER — TELEPHONE (OUTPATIENT)
Dept: TRANSPLANT | Facility: CLINIC | Age: 38
End: 2023-07-03
Payer: MEDICAID

## 2023-07-03 ENCOUNTER — ANTI-COAG VISIT (OUTPATIENT)
Dept: CARDIOLOGY | Facility: CLINIC | Age: 38
End: 2023-07-03
Payer: MEDICAID

## 2023-07-03 ENCOUNTER — PATIENT MESSAGE (OUTPATIENT)
Dept: CARDIOLOGY | Facility: CLINIC | Age: 38
End: 2023-07-03

## 2023-07-03 DIAGNOSIS — Z95.811 LVAD (LEFT VENTRICULAR ASSIST DEVICE) PRESENT: Primary | ICD-10-CM

## 2023-07-03 PROCEDURE — 93793 PR ANTICOAGULANT MGMT FOR PT TAKING WARFARIN: ICD-10-PCS | Mod: ,,,

## 2023-07-03 PROCEDURE — 93793 ANTICOAG MGMT PT WARFARIN: CPT | Mod: ,,,

## 2023-07-03 NOTE — PROGRESS NOTES
Patient's labs reviewed, no changes made at this time, CR and AST/ALT significantly improved. Will continue to monitor patient's weekly labs.

## 2023-07-06 ENCOUNTER — TELEPHONE (OUTPATIENT)
Dept: TRANSPLANT | Facility: CLINIC | Age: 38
End: 2023-07-06
Payer: MEDICAID

## 2023-07-06 DIAGNOSIS — Z95.811 LVAD (LEFT VENTRICULAR ASSIST DEVICE) PRESENT: Primary | ICD-10-CM

## 2023-07-06 NOTE — TELEPHONE ENCOUNTER
"[facility] did not process BNP on 6/30/23  "Test not Performed"  "No specimen received"    This information relayed to VAD coordinator.   "

## 2023-07-17 ENCOUNTER — TELEPHONE (OUTPATIENT)
Dept: TRANSPLANT | Facility: CLINIC | Age: 38
End: 2023-07-17
Payer: MEDICAID

## 2023-07-17 NOTE — TELEPHONE ENCOUNTER
Attempted to contact patient to f/u on lab visit from last week and this week.   Received message calls were not being accepted at this time

## 2023-07-18 ENCOUNTER — TELEPHONE (OUTPATIENT)
Dept: TRANSPLANT | Facility: CLINIC | Age: 38
End: 2023-07-18
Payer: MEDICAID

## 2023-07-18 NOTE — TELEPHONE ENCOUNTER
Robyn called to inform the team that they had some personal issues and had to move back to her mom's house temporarily

## 2023-07-21 ENCOUNTER — TELEPHONE (OUTPATIENT)
Dept: TRANSPLANT | Facility: CLINIC | Age: 38
End: 2023-07-21
Payer: MEDICAID

## 2023-07-21 NOTE — TELEPHONE ENCOUNTER
Patient's significant other Robyn called LVAD coordinator and reported that she called 911 because patient was having pain and fever of 101, she stated that his LVAD site looked ok last time they changed it and that he had been working outside for 30-45 minutes prior to events. On call coordinator MATEUS Mahoney notified and Provider Griffin notified.

## 2023-07-22 ENCOUNTER — HOSPITAL ENCOUNTER (INPATIENT)
Facility: HOSPITAL | Age: 38
LOS: 17 days | Discharge: HOME | DRG: 871 | End: 2023-08-08
Attending: INTERNAL MEDICINE | Admitting: INTERNAL MEDICINE
Payer: MEDICAID

## 2023-07-22 DIAGNOSIS — E11.65 TYPE 2 DIABETES MELLITUS WITH HYPERGLYCEMIA, UNSPECIFIED WHETHER LONG TERM INSULIN USE: ICD-10-CM

## 2023-07-22 DIAGNOSIS — A41.9 SEPSIS: ICD-10-CM

## 2023-07-22 DIAGNOSIS — I50.42 CHRONIC COMBINED SYSTOLIC AND DIASTOLIC HEART FAILURE: ICD-10-CM

## 2023-07-22 DIAGNOSIS — T82.7XXA INFECTION ASSOCIATED WITH DRIVELINE OF LEFT VENTRICULAR ASSIST DEVICE (LVAD): ICD-10-CM

## 2023-07-22 DIAGNOSIS — R07.9 CHEST PAIN: ICD-10-CM

## 2023-07-22 DIAGNOSIS — R56.9 SEIZURE-LIKE ACTIVITY: ICD-10-CM

## 2023-07-22 DIAGNOSIS — J96.01 ACUTE RESPIRATORY FAILURE WITH HYPOXIA: ICD-10-CM

## 2023-07-22 DIAGNOSIS — Z79.4 TYPE 2 DIABETES MELLITUS WITH HYPERGLYCEMIA, WITH LONG-TERM CURRENT USE OF INSULIN: ICD-10-CM

## 2023-07-22 DIAGNOSIS — R00.0 TACHYCARDIA: ICD-10-CM

## 2023-07-22 DIAGNOSIS — R07.9 CHEST PAIN, UNSPECIFIED TYPE: ICD-10-CM

## 2023-07-22 DIAGNOSIS — A41.9 SEVERE SEPSIS: ICD-10-CM

## 2023-07-22 DIAGNOSIS — J90 PLEURAL EFFUSION: ICD-10-CM

## 2023-07-22 DIAGNOSIS — A41.01 SEPSIS DUE TO METHICILLIN SUSCEPTIBLE STAPHYLOCOCCUS AUREUS, UNSPECIFIED WHETHER ACUTE ORGAN DYSFUNCTION PRESENT: ICD-10-CM

## 2023-07-22 DIAGNOSIS — R07.89 OTHER CHEST PAIN: ICD-10-CM

## 2023-07-22 DIAGNOSIS — R65.20 SEVERE SEPSIS: ICD-10-CM

## 2023-07-22 DIAGNOSIS — B95.8 BACTEREMIA DUE TO STAPHYLOCOCCUS: ICD-10-CM

## 2023-07-22 DIAGNOSIS — A41.01 SEPSIS DUE TO METHICILLIN SUSCEPTIBLE STAPHYLOCOCCUS AUREUS (MSSA) WITHOUT ACUTE ORGAN DYSFUNCTION: ICD-10-CM

## 2023-07-22 DIAGNOSIS — E11.65 TYPE 2 DIABETES MELLITUS WITH HYPERGLYCEMIA, WITH LONG-TERM CURRENT USE OF INSULIN: ICD-10-CM

## 2023-07-22 DIAGNOSIS — R78.81 BACTEREMIA DUE TO STAPHYLOCOCCUS: ICD-10-CM

## 2023-07-22 DIAGNOSIS — A41.9 SEPSIS, DUE TO UNSPECIFIED ORGANISM, UNSPECIFIED WHETHER ACUTE ORGAN DYSFUNCTION PRESENT: Primary | ICD-10-CM

## 2023-07-22 DIAGNOSIS — Z95.811 LVAD (LEFT VENTRICULAR ASSIST DEVICE) PRESENT: ICD-10-CM

## 2023-07-22 PROBLEM — J18.9 CAP (COMMUNITY ACQUIRED PNEUMONIA): Status: ACTIVE | Noted: 2023-07-22

## 2023-07-22 LAB
ALBUMIN SERPL BCP-MCNC: 2.2 G/DL (ref 3.5–5.2)
ALP SERPL-CCNC: 198 U/L (ref 55–135)
ALT SERPL W/O P-5'-P-CCNC: 45 U/L (ref 10–44)
AMPHET+METHAMPHET UR QL: NEGATIVE
ANION GAP SERPL CALC-SCNC: 13 MMOL/L (ref 8–16)
ANION GAP SERPL CALC-SCNC: 14 MMOL/L (ref 8–16)
ANION GAP SERPL CALC-SCNC: 15 MMOL/L (ref 8–16)
ANISOCYTOSIS BLD QL SMEAR: SLIGHT
APTT PPP: 106.6 SEC (ref 21–32)
APTT PPP: 99 SEC (ref 21–32)
AST SERPL-CCNC: 59 U/L (ref 10–40)
BARBITURATES UR QL SCN>200 NG/ML: NEGATIVE
BASOPHILS # BLD AUTO: ABNORMAL K/UL (ref 0–0.2)
BASOPHILS NFR BLD: 0 % (ref 0–1.9)
BENZODIAZ UR QL SCN>200 NG/ML: NEGATIVE
BILIRUB SERPL-MCNC: 1.4 MG/DL (ref 0.1–1)
BNP SERPL-MCNC: 474 PG/ML (ref 0–99)
BUN SERPL-MCNC: 18 MG/DL (ref 6–20)
BUN SERPL-MCNC: 19 MG/DL (ref 6–20)
BUN SERPL-MCNC: 21 MG/DL (ref 6–20)
BURR CELLS BLD QL SMEAR: ABNORMAL
BZE UR QL SCN: NEGATIVE
CALCIUM SERPL-MCNC: 8.8 MG/DL (ref 8.7–10.5)
CALCIUM SERPL-MCNC: 9.1 MG/DL (ref 8.7–10.5)
CALCIUM SERPL-MCNC: 9.1 MG/DL (ref 8.7–10.5)
CANNABINOIDS UR QL SCN: NEGATIVE
CHLORIDE SERPL-SCNC: 93 MMOL/L (ref 95–110)
CHLORIDE SERPL-SCNC: 93 MMOL/L (ref 95–110)
CHLORIDE SERPL-SCNC: 94 MMOL/L (ref 95–110)
CO2 SERPL-SCNC: 21 MMOL/L (ref 23–29)
CO2 SERPL-SCNC: 24 MMOL/L (ref 23–29)
CO2 SERPL-SCNC: 24 MMOL/L (ref 23–29)
CREAT SERPL-MCNC: 1.1 MG/DL (ref 0.5–1.4)
CREAT SERPL-MCNC: 1.2 MG/DL (ref 0.5–1.4)
CREAT SERPL-MCNC: 1.4 MG/DL (ref 0.5–1.4)
CREAT UR-MCNC: 200 MG/DL (ref 23–375)
DACRYOCYTES BLD QL SMEAR: ABNORMAL
DIFFERENTIAL METHOD: ABNORMAL
DOHLE BOD BLD QL SMEAR: PRESENT
EOSINOPHIL # BLD AUTO: ABNORMAL K/UL (ref 0–0.5)
EOSINOPHIL NFR BLD: 0 % (ref 0–8)
ERYTHROCYTE [DISTWIDTH] IN BLOOD BY AUTOMATED COUNT: 22.5 % (ref 11.5–14.5)
EST. GFR  (NO RACE VARIABLE): >60 ML/MIN/1.73 M^2
ESTIMATED AVG GLUCOSE: 146 MG/DL (ref 68–131)
ETHANOL UR-MCNC: <10 MG/DL
GLUCOSE SERPL-MCNC: 165 MG/DL (ref 70–110)
GLUCOSE SERPL-MCNC: 166 MG/DL (ref 70–110)
GLUCOSE SERPL-MCNC: 252 MG/DL (ref 70–110)
HBA1C MFR BLD: 6.7 % (ref 4–5.6)
HCT VFR BLD AUTO: 35.3 % (ref 40–54)
HGB BLD-MCNC: 11.8 G/DL (ref 14–18)
HYPOCHROMIA BLD QL SMEAR: ABNORMAL
IMM GRANULOCYTES # BLD AUTO: ABNORMAL K/UL (ref 0–0.04)
IMM GRANULOCYTES NFR BLD AUTO: ABNORMAL % (ref 0–0.5)
INR PPP: 1.5 (ref 0.8–1.2)
INR PPP: >10 (ref 0.8–1.2)
LACTATE SERPL-SCNC: 1.8 MMOL/L (ref 0.5–2.2)
LDH SERPL L TO P-CCNC: 549 U/L (ref 110–260)
LYMPHOCYTES # BLD AUTO: ABNORMAL K/UL (ref 1–4.8)
LYMPHOCYTES NFR BLD: 8 % (ref 18–48)
MAGNESIUM SERPL-MCNC: 1.7 MG/DL (ref 1.6–2.6)
MCH RBC QN AUTO: 26.9 PG (ref 27–31)
MCHC RBC AUTO-ENTMCNC: 33.4 G/DL (ref 32–36)
MCV RBC AUTO: 80 FL (ref 82–98)
METAMYELOCYTES NFR BLD MANUAL: 1 %
METHADONE UR QL SCN>300 NG/ML: NEGATIVE
MONOCYTES # BLD AUTO: ABNORMAL K/UL (ref 0.3–1)
MONOCYTES NFR BLD: 3 % (ref 4–15)
MYELOCYTES NFR BLD MANUAL: 1 %
NEUTROPHILS NFR BLD: 82 % (ref 38–73)
NEUTS BAND NFR BLD MANUAL: 5 %
NRBC BLD-RTO: 0 /100 WBC
OPIATES UR QL SCN: ABNORMAL
OVALOCYTES BLD QL SMEAR: ABNORMAL
PCP UR QL SCN>25 NG/ML: NEGATIVE
PHOSPHATE SERPL-MCNC: 3.4 MG/DL (ref 2.7–4.5)
PLATELET # BLD AUTO: 411 K/UL (ref 150–450)
PLATELET BLD QL SMEAR: ABNORMAL
PMV BLD AUTO: 10.3 FL (ref 9.2–12.9)
POIKILOCYTOSIS BLD QL SMEAR: SLIGHT
POLYCHROMASIA BLD QL SMEAR: ABNORMAL
POTASSIUM SERPL-SCNC: 4 MMOL/L (ref 3.5–5.1)
POTASSIUM SERPL-SCNC: 4.5 MMOL/L (ref 3.5–5.1)
POTASSIUM SERPL-SCNC: 4.5 MMOL/L (ref 3.5–5.1)
PROCALCITONIN SERPL IA-MCNC: 10.77 NG/ML
PROT SERPL-MCNC: 7.3 G/DL (ref 6–8.4)
PROTHROMBIN TIME: 16.1 SEC (ref 9–12.5)
PROTHROMBIN TIME: >100 SEC (ref 9–12.5)
RBC # BLD AUTO: 4.39 M/UL (ref 4.6–6.2)
SARS-COV-2 RNA RESP QL NAA+PROBE: DETECTED
SCHISTOCYTES BLD QL SMEAR: ABNORMAL
SCHISTOCYTES BLD QL SMEAR: PRESENT
SODIUM SERPL-SCNC: 129 MMOL/L (ref 136–145)
SODIUM SERPL-SCNC: 130 MMOL/L (ref 136–145)
SODIUM SERPL-SCNC: 132 MMOL/L (ref 136–145)
SPHEROCYTES BLD QL SMEAR: ABNORMAL
TARGETS BLD QL SMEAR: ABNORMAL
TOXIC GRANULES BLD QL SMEAR: PRESENT
TOXICOLOGY INFORMATION: ABNORMAL
WBC # BLD AUTO: 27.45 K/UL (ref 3.9–12.7)
WBC TOXIC VACUOLES BLD QL SMEAR: PRESENT

## 2023-07-22 PROCEDURE — 83735 ASSAY OF MAGNESIUM: CPT | Performed by: HOSPITALIST

## 2023-07-22 PROCEDURE — 63600175 PHARM REV CODE 636 W HCPCS: Performed by: HOSPITALIST

## 2023-07-22 PROCEDURE — 27000248 HC VAD-ADDITIONAL DAY

## 2023-07-22 PROCEDURE — 83605 ASSAY OF LACTIC ACID: CPT | Performed by: HOSPITALIST

## 2023-07-22 PROCEDURE — 87070 CULTURE OTHR SPECIMN AEROBIC: CPT | Performed by: HOSPITALIST

## 2023-07-22 PROCEDURE — 94761 N-INVAS EAR/PLS OXIMETRY MLT: CPT

## 2023-07-22 PROCEDURE — 25000003 PHARM REV CODE 250: Performed by: STUDENT IN AN ORGANIZED HEALTH CARE EDUCATION/TRAINING PROGRAM

## 2023-07-22 PROCEDURE — 99900035 HC TECH TIME PER 15 MIN (STAT)

## 2023-07-22 PROCEDURE — 87075 CULTR BACTERIA EXCEPT BLOOD: CPT | Performed by: HOSPITALIST

## 2023-07-22 PROCEDURE — 85610 PROTHROMBIN TIME: CPT | Performed by: STUDENT IN AN ORGANIZED HEALTH CARE EDUCATION/TRAINING PROGRAM

## 2023-07-22 PROCEDURE — 85027 COMPLETE CBC AUTOMATED: CPT | Performed by: HOSPITALIST

## 2023-07-22 PROCEDURE — 87077 CULTURE AEROBIC IDENTIFY: CPT | Performed by: HOSPITALIST

## 2023-07-22 PROCEDURE — 87186 SC STD MICRODIL/AGAR DIL: CPT | Mod: 59 | Performed by: STUDENT IN AN ORGANIZED HEALTH CARE EDUCATION/TRAINING PROGRAM

## 2023-07-22 PROCEDURE — 80053 COMPREHEN METABOLIC PANEL: CPT | Performed by: STUDENT IN AN ORGANIZED HEALTH CARE EDUCATION/TRAINING PROGRAM

## 2023-07-22 PROCEDURE — 63600175 PHARM REV CODE 636 W HCPCS: Performed by: STUDENT IN AN ORGANIZED HEALTH CARE EDUCATION/TRAINING PROGRAM

## 2023-07-22 PROCEDURE — 85610 PROTHROMBIN TIME: CPT | Mod: 91 | Performed by: STUDENT IN AN ORGANIZED HEALTH CARE EDUCATION/TRAINING PROGRAM

## 2023-07-22 PROCEDURE — 84100 ASSAY OF PHOSPHORUS: CPT | Performed by: HOSPITALIST

## 2023-07-22 PROCEDURE — 93010 EKG 12-LEAD: ICD-10-PCS | Mod: ,,, | Performed by: INTERNAL MEDICINE

## 2023-07-22 PROCEDURE — 99223 1ST HOSP IP/OBS HIGH 75: CPT | Mod: ,,, | Performed by: INTERNAL MEDICINE

## 2023-07-22 PROCEDURE — 25000003 PHARM REV CODE 250: Performed by: HOSPITALIST

## 2023-07-22 PROCEDURE — 25000003 PHARM REV CODE 250: Performed by: INTERNAL MEDICINE

## 2023-07-22 PROCEDURE — 85610 PROTHROMBIN TIME: CPT | Mod: 91 | Performed by: INTERNAL MEDICINE

## 2023-07-22 PROCEDURE — 87077 CULTURE AEROBIC IDENTIFY: CPT | Mod: 59 | Performed by: STUDENT IN AN ORGANIZED HEALTH CARE EDUCATION/TRAINING PROGRAM

## 2023-07-22 PROCEDURE — 93010 ELECTROCARDIOGRAM REPORT: CPT | Mod: ,,, | Performed by: INTERNAL MEDICINE

## 2023-07-22 PROCEDURE — 63600175 PHARM REV CODE 636 W HCPCS: Performed by: NURSE PRACTITIONER

## 2023-07-22 PROCEDURE — 87186 SC STD MICRODIL/AGAR DIL: CPT | Performed by: HOSPITALIST

## 2023-07-22 PROCEDURE — 87635 SARS-COV-2 COVID-19 AMP PRB: CPT | Performed by: HOSPITALIST

## 2023-07-22 PROCEDURE — 99232 PR SUBSEQUENT HOSPITAL CARE,LEVL II: ICD-10-PCS | Mod: ,,, | Performed by: NURSE PRACTITIONER

## 2023-07-22 PROCEDURE — 93005 ELECTROCARDIOGRAM TRACING: CPT

## 2023-07-22 PROCEDURE — 25000003 PHARM REV CODE 250: Performed by: NURSE PRACTITIONER

## 2023-07-22 PROCEDURE — 85730 THROMBOPLASTIN TIME PARTIAL: CPT | Mod: 91 | Performed by: STUDENT IN AN ORGANIZED HEALTH CARE EDUCATION/TRAINING PROGRAM

## 2023-07-22 PROCEDURE — 83880 ASSAY OF NATRIURETIC PEPTIDE: CPT | Performed by: HOSPITALIST

## 2023-07-22 PROCEDURE — 63600175 PHARM REV CODE 636 W HCPCS: Performed by: INTERNAL MEDICINE

## 2023-07-22 PROCEDURE — 83615 LACTATE (LD) (LDH) ENZYME: CPT | Performed by: HOSPITALIST

## 2023-07-22 PROCEDURE — 84145 PROCALCITONIN (PCT): CPT | Performed by: HOSPITALIST

## 2023-07-22 PROCEDURE — 85007 BL SMEAR W/DIFF WBC COUNT: CPT | Performed by: HOSPITALIST

## 2023-07-22 PROCEDURE — 80048 BASIC METABOLIC PNL TOTAL CA: CPT | Performed by: HOSPITALIST

## 2023-07-22 PROCEDURE — 80307 DRUG TEST PRSMV CHEM ANLYZR: CPT | Performed by: HOSPITALIST

## 2023-07-22 PROCEDURE — 20000000 HC ICU ROOM

## 2023-07-22 PROCEDURE — 27000221 HC OXYGEN, UP TO 24 HOURS

## 2023-07-22 PROCEDURE — 87040 BLOOD CULTURE FOR BACTERIA: CPT | Performed by: STUDENT IN AN ORGANIZED HEALTH CARE EDUCATION/TRAINING PROGRAM

## 2023-07-22 PROCEDURE — 99223 PR INITIAL HOSPITAL CARE,LEVL III: ICD-10-PCS | Mod: ,,, | Performed by: INTERNAL MEDICINE

## 2023-07-22 PROCEDURE — 99232 SBSQ HOSP IP/OBS MODERATE 35: CPT | Mod: ,,, | Performed by: NURSE PRACTITIONER

## 2023-07-22 PROCEDURE — 63600175 PHARM REV CODE 636 W HCPCS: Mod: JZ,TB | Performed by: STUDENT IN AN ORGANIZED HEALTH CARE EDUCATION/TRAINING PROGRAM

## 2023-07-22 PROCEDURE — 93750 PR INTERROGATE VENT ASSIST DEV, IN PERSON, W PHYSICIAN ANALYSIS: ICD-10-PCS | Mod: ,,, | Performed by: INTERNAL MEDICINE

## 2023-07-22 PROCEDURE — 87150 DNA/RNA AMPLIFIED PROBE: CPT | Mod: 59 | Performed by: STUDENT IN AN ORGANIZED HEALTH CARE EDUCATION/TRAINING PROGRAM

## 2023-07-22 PROCEDURE — 93750 INTERROGATION VAD IN PERSON: CPT | Mod: ,,, | Performed by: INTERNAL MEDICINE

## 2023-07-22 PROCEDURE — 83036 HEMOGLOBIN GLYCOSYLATED A1C: CPT | Performed by: HOSPITALIST

## 2023-07-22 RX ORDER — INSULIN ASPART 100 [IU]/ML
6 INJECTION, SOLUTION INTRAVENOUS; SUBCUTANEOUS
Status: DISCONTINUED | OUTPATIENT
Start: 2023-07-22 | End: 2023-07-23

## 2023-07-22 RX ORDER — FUROSEMIDE 10 MG/ML
80 INJECTION INTRAMUSCULAR; INTRAVENOUS
Status: DISCONTINUED | OUTPATIENT
Start: 2023-07-22 | End: 2023-07-24

## 2023-07-22 RX ORDER — GLUCAGON 1 MG
1 KIT INJECTION
Status: DISCONTINUED | OUTPATIENT
Start: 2023-07-22 | End: 2023-08-09 | Stop reason: HOSPADM

## 2023-07-22 RX ORDER — VALSARTAN 40 MG/1
40 TABLET ORAL 2 TIMES DAILY
Status: DISCONTINUED | OUTPATIENT
Start: 2023-07-22 | End: 2023-07-22

## 2023-07-22 RX ORDER — IBUPROFEN 200 MG
16 TABLET ORAL
Status: DISCONTINUED | OUTPATIENT
Start: 2023-07-22 | End: 2023-08-09 | Stop reason: HOSPADM

## 2023-07-22 RX ORDER — WARFARIN 3 MG/1
6 TABLET ORAL
Status: DISCONTINUED | OUTPATIENT
Start: 2023-07-23 | End: 2023-07-22

## 2023-07-22 RX ORDER — IBUPROFEN 200 MG
24 TABLET ORAL
Status: DISCONTINUED | OUTPATIENT
Start: 2023-07-22 | End: 2023-08-09 | Stop reason: HOSPADM

## 2023-07-22 RX ORDER — INSULIN ASPART 100 [IU]/ML
1-10 INJECTION, SOLUTION INTRAVENOUS; SUBCUTANEOUS
Status: DISCONTINUED | OUTPATIENT
Start: 2023-07-22 | End: 2023-07-22

## 2023-07-22 RX ORDER — ACETAMINOPHEN 325 MG/1
650 TABLET ORAL EVERY 6 HOURS PRN
Status: DISCONTINUED | OUTPATIENT
Start: 2023-07-22 | End: 2023-08-05

## 2023-07-22 RX ORDER — MIRTAZAPINE 15 MG/1
15 TABLET, FILM COATED ORAL NIGHTLY
Status: DISCONTINUED | OUTPATIENT
Start: 2023-07-22 | End: 2023-08-09 | Stop reason: HOSPADM

## 2023-07-22 RX ORDER — BUSPIRONE HYDROCHLORIDE 5 MG/1
10 TABLET ORAL 2 TIMES DAILY
Status: DISCONTINUED | OUTPATIENT
Start: 2023-07-22 | End: 2023-08-09 | Stop reason: HOSPADM

## 2023-07-22 RX ORDER — LEVETIRACETAM 500 MG/1
1000 TABLET ORAL 2 TIMES DAILY
Status: DISCONTINUED | OUTPATIENT
Start: 2023-07-22 | End: 2023-08-09 | Stop reason: HOSPADM

## 2023-07-22 RX ORDER — MILRINONE LACTATE 0.2 MG/ML
0.25 INJECTION, SOLUTION INTRAVENOUS CONTINUOUS
Status: DISCONTINUED | OUTPATIENT
Start: 2023-07-22 | End: 2023-08-09 | Stop reason: HOSPADM

## 2023-07-22 RX ORDER — WARFARIN 3 MG/1
6 TABLET ORAL
Status: DISCONTINUED | OUTPATIENT
Start: 2023-07-26 | End: 2023-07-22

## 2023-07-22 RX ORDER — INSULIN ASPART 100 [IU]/ML
1-10 INJECTION, SOLUTION INTRAVENOUS; SUBCUTANEOUS
Status: DISCONTINUED | OUTPATIENT
Start: 2023-07-22 | End: 2023-07-29

## 2023-07-22 RX ORDER — METOPROLOL TARTRATE 1 MG/ML
5 INJECTION, SOLUTION INTRAVENOUS ONCE
Status: DISCONTINUED | OUTPATIENT
Start: 2023-07-22 | End: 2023-07-22

## 2023-07-22 RX ORDER — HYDROCODONE BITARTRATE AND ACETAMINOPHEN 5; 325 MG/1; MG/1
1 TABLET ORAL ONCE
Status: COMPLETED | OUTPATIENT
Start: 2023-07-22 | End: 2023-07-22

## 2023-07-22 RX ORDER — FUROSEMIDE 10 MG/ML
80 INJECTION INTRAMUSCULAR; INTRAVENOUS ONCE
Status: COMPLETED | OUTPATIENT
Start: 2023-07-22 | End: 2023-07-22

## 2023-07-22 RX ORDER — ACETAMINOPHEN 500 MG
1000 TABLET ORAL ONCE
Status: COMPLETED | OUTPATIENT
Start: 2023-07-22 | End: 2023-07-22

## 2023-07-22 RX ORDER — MAGNESIUM SULFATE HEPTAHYDRATE 40 MG/ML
2 INJECTION, SOLUTION INTRAVENOUS ONCE
Status: COMPLETED | OUTPATIENT
Start: 2023-07-22 | End: 2023-07-22

## 2023-07-22 RX ORDER — HYDROCODONE BITARTRATE AND ACETAMINOPHEN 5; 325 MG/1; MG/1
1 TABLET ORAL EVERY 6 HOURS PRN
Status: DISCONTINUED | OUTPATIENT
Start: 2023-07-22 | End: 2023-08-05

## 2023-07-22 RX ORDER — SPIRONOLACTONE 25 MG/1
25 TABLET ORAL DAILY
Status: DISCONTINUED | OUTPATIENT
Start: 2023-07-22 | End: 2023-07-22

## 2023-07-22 RX ORDER — FUROSEMIDE 80 MG/1
80 TABLET ORAL DAILY
Status: DISCONTINUED | OUTPATIENT
Start: 2023-07-22 | End: 2023-07-22

## 2023-07-22 RX ORDER — OXYCODONE HYDROCHLORIDE 5 MG/1
5 TABLET ORAL ONCE
Status: COMPLETED | OUTPATIENT
Start: 2023-07-22 | End: 2023-07-22

## 2023-07-22 RX ADMIN — LEVETIRACETAM 1000 MG: 500 TABLET, FILM COATED ORAL at 09:07

## 2023-07-22 RX ADMIN — ACETAMINOPHEN 650 MG: 325 TABLET ORAL at 02:07

## 2023-07-22 RX ADMIN — PHYTONADIONE 5 MG: 10 INJECTION, EMULSION INTRAMUSCULAR; INTRAVENOUS; SUBCUTANEOUS at 07:07

## 2023-07-22 RX ADMIN — FUROSEMIDE 80 MG: 10 INJECTION, SOLUTION INTRAMUSCULAR; INTRAVENOUS at 03:07

## 2023-07-22 RX ADMIN — VANCOMYCIN HYDROCHLORIDE 2000 MG: 10 INJECTION, POWDER, LYOPHILIZED, FOR SOLUTION INTRAVENOUS at 03:07

## 2023-07-22 RX ADMIN — PIPERACILLIN SODIUM AND TAZOBACTAM SODIUM 4.5 G: 4; .5 INJECTION, POWDER, FOR SOLUTION INTRAVENOUS at 01:07

## 2023-07-22 RX ADMIN — MIRTAZAPINE 15 MG: 15 TABLET, FILM COATED ORAL at 09:07

## 2023-07-22 RX ADMIN — HYDROCODONE BITARTRATE AND ACETAMINOPHEN 1 TABLET: 5; 325 TABLET ORAL at 02:07

## 2023-07-22 RX ADMIN — DEXAMETHASONE 6 MG: 4 TABLET ORAL at 02:07

## 2023-07-22 RX ADMIN — LEVETIRACETAM 1000 MG: 500 TABLET, FILM COATED ORAL at 08:07

## 2023-07-22 RX ADMIN — INSULIN ASPART 6 UNITS: 100 INJECTION, SOLUTION INTRAVENOUS; SUBCUTANEOUS at 07:07

## 2023-07-22 RX ADMIN — INSULIN ASPART 6 UNITS: 100 INJECTION, SOLUTION INTRAVENOUS; SUBCUTANEOUS at 05:07

## 2023-07-22 RX ADMIN — VANCOMYCIN HYDROCHLORIDE 1500 MG: 1.5 INJECTION, POWDER, LYOPHILIZED, FOR SOLUTION INTRAVENOUS at 03:07

## 2023-07-22 RX ADMIN — FUROSEMIDE 80 MG: 10 INJECTION, SOLUTION INTRAVENOUS at 09:07

## 2023-07-22 RX ADMIN — HYDROCODONE BITARTRATE AND ACETAMINOPHEN 1 TABLET: 5; 325 TABLET ORAL at 10:07

## 2023-07-22 RX ADMIN — INSULIN ASPART 2 UNITS: 100 INJECTION, SOLUTION INTRAVENOUS; SUBCUTANEOUS at 10:07

## 2023-07-22 RX ADMIN — REMDESIVIR 200 MG: 100 INJECTION, POWDER, LYOPHILIZED, FOR SOLUTION INTRAVENOUS at 01:07

## 2023-07-22 RX ADMIN — PIPERACILLIN SODIUM AND TAZOBACTAM SODIUM 4.5 G: 4; .5 INJECTION, POWDER, FOR SOLUTION INTRAVENOUS at 05:07

## 2023-07-22 RX ADMIN — BUSPIRONE HYDROCHLORIDE 10 MG: 10 TABLET ORAL at 08:07

## 2023-07-22 RX ADMIN — MIRTAZAPINE 15 MG: 15 TABLET, FILM COATED ORAL at 12:07

## 2023-07-22 RX ADMIN — HYDROCODONE BITARTRATE AND ACETAMINOPHEN 1 TABLET: 5; 325 TABLET ORAL at 04:07

## 2023-07-22 RX ADMIN — INSULIN DETEMIR 20 UNITS: 100 INJECTION, SOLUTION SUBCUTANEOUS at 01:07

## 2023-07-22 RX ADMIN — ACETAMINOPHEN 1000 MG: 500 TABLET ORAL at 03:07

## 2023-07-22 RX ADMIN — MILRINONE LACTATE 0.25 MCG/KG/MIN: 0.2 INJECTION, SOLUTION INTRAVENOUS at 03:07

## 2023-07-22 RX ADMIN — INSULIN ASPART 6 UNITS: 100 INJECTION, SOLUTION INTRAVENOUS; SUBCUTANEOUS at 01:07

## 2023-07-22 RX ADMIN — PIPERACILLIN SODIUM AND TAZOBACTAM SODIUM 4.5 G: 4; .5 INJECTION, POWDER, FOR SOLUTION INTRAVENOUS at 09:07

## 2023-07-22 RX ADMIN — BUSPIRONE HYDROCHLORIDE 10 MG: 10 TABLET ORAL at 09:07

## 2023-07-22 RX ADMIN — MILRINONE LACTATE 0.25 MCG/KG/MIN: 0.2 INJECTION, SOLUTION INTRAVENOUS at 01:07

## 2023-07-22 RX ADMIN — INSULIN ASPART 2 UNITS: 100 INJECTION, SOLUTION INTRAVENOUS; SUBCUTANEOUS at 09:07

## 2023-07-22 RX ADMIN — OXYCODONE HYDROCHLORIDE 5 MG: 5 TABLET ORAL at 04:07

## 2023-07-22 RX ADMIN — MAGNESIUM SULFATE 2 G: 2 INJECTION INTRAVENOUS at 04:07

## 2023-07-22 NOTE — NURSING
SICU PLAN OF CARE NOTE    Dx: Severe sepsis    Shift Events: Admitted to SICU from CSU    Goals of Care: Doppler <90    Neuro: AAO x4, Follows Commands, and Moves All Extremities    Vital Signs: BP (!) 80/0 (BP Location: Left arm, Patient Position: Lying)   Pulse (!) 124   Temp 99.6 °F (37.6 °C) (Oral)   Resp (!) 34   Wt 98.9 kg (218 lb 0.6 oz)   SpO2 (!) 93%   BMI 27.25 kg/m²     Respiratory: Nasal Cannula 5L, RR 30-40s    Diet: Cardiac Diet    Gtts: Primacor @ 0.25 mcg/kg/min    Urine Output: Voids Spontaneously 1250 cc/shift    VAD HM3   Speed: 5100   LSL: 4700   Flows: 4.3-4.6   PI: 3.5-3.9   Power: 3.6-3.7     Labs/Accuchecks: Accuchecks ACHS.    Skin: Skin CDI. No skin breakdown noted on admit. Waffle mattress inflated. Adhesive use limited.

## 2023-07-22 NOTE — NURSING
Nurses Note -- 4 Eyes      7/22/2023   5:23 AM      Skin assessed during: Transfer      [x] No Altered Skin Integrity Present    [x]Prevention Measures Documented      [] Yes- Altered Skin Integrity Present or Discovered   [] LDA Added if Not in Epic (Describe Wound)   [] New Altered Skin Integrity was Present on Admit and Documented in LDA   [] Wound Image Taken    Wound Care Consulted? No    Attending Nurse:  Funmilayo Morales RN     Second RN/Staff Member:  Paulino Billings RN

## 2023-07-22 NOTE — CONSULTS
[facility] - Surgical Intensive Care  Endocrinology  Diabetes Consult Note    Consult Requested by: Josh Pulido MD   Reason for admit: Severe sepsis    HISTORY OF PRESENT ILLNESS:  Reason for Consult: Management of T2DM, Hyperglycemia     Surgical Procedure and Date: HM3 implantation 6/23/2022    Diabetes diagnosis year:  2022    Home Diabetes Medications:  Levemir 22 units BID, Novolog 16 units TID with meals in addition to SSI (151-200/+1)     How often checking glucose at home? Freestyle William    BG readings on regimen: 170-low 200s  Hypoglycemia on the regimen?  No  Missed doses on regimen?  Yes    Diabetes Complications include:     Hyperglycemia     Complicating diabetes co morbidities:   History of MI, CHF, and CKD         HPI:   Patient is a 38 y.o. 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 He underwent RVAD removal and chest closure 6/30/2022.  He was weaned off  but he had to restarted due to RVF. He was eventually transitioned to milrinone (secondary to  shortage) now on 0.25 mcg/kg/min.  Patient also has history of driveline infection.  Patient presented to the emergency room today because of pleuritic chest pain that has been going on for the last 3 days however it was more intense today.  Also complains of having shortness of breath and fevers associated with it.  He was spiking fever of 102 in the emergency room with elevated white blood cell count up to 17,000. He was treated with Zosyn.  He was sent here for admission.  Patient was admitted a month back for COVID infection for which he received remdesivir for 3 days. Patient has ground-glass opacities on imaging from outside hospital. Endocrinology consulted for management of T2DM.      Lab Results   Component Value Date     HGBA1C 6.7 (H) 2023           Interval HPI:   Overnight events: Remains in SICU. BG above goal ranges on prn SQ insulin correction scale. Diet Vegan    Eatin%  Nausea: No  Hypoglycemia and intervention: No  Fever: No  TPN and/or TF: No  If yes, type of TF/TPN and rate: n/a    PMH, PSH, FH, SH reviewed     ROS:  Constitutional: Positive for fever  Eyes: Negative for visual disturbance.  Respiratory: Positive for  cough and SOB  Cardiovascular: Negative for chest pain.  Gastrointestinal: Negative for nausea.  Endocrine: Negative for polyuria, polydipsia.  Musculoskeletal: Negative for back pain.  Skin: Negative for rash.  Neurological: Negative for syncope.  Psychiatric/Behavioral: Negative for depression.    Review of Systems    Current Medications and/or Treatments Impacting Glycemic Control  Immunotherapy:    Immunosuppressants       None          Steroids:   Hormones (From admission, onward)      None          Pressors:    Autonomic Drugs (From admission, onward)      None          Hyperglycemia/Diabetes Medications:   Antihyperglycemics (From admission, onward)      Start     Stop Route Frequency Ordered    23 1230  insulin detemir U-100 (Levemir) pen 20 Units         -- SubQ Daily 23 1221    23 1230  insulin aspart U-100 pen 6 Units         -- SubQ 3 times daily with meals 23 1221    23 0715  insulin aspart U-100 pen 1-10 Units         -- SubQ 3 times daily with meals 23 0041             PHYSICAL EXAMINATION:  Vitals:    23 1102   BP: (!) 85/0   Pulse: (!) 127   Resp: (!) 37   Temp: 99.1 °F (37.3 °C)     Body mass index is 27.25 kg/m².     Physical Exam   Deferred to decrease risk/exposure of COVID-19.       Labs Reviewed and Include   Recent Labs   Lab 23  0452   *   CALCIUM 8.8   ALBUMIN 2.2*   PROT 7.3   *   K 4.0   CO2 21*   CL 93*   BUN 21*   CREATININE 1.4   ALKPHOS 198*   ALT 45*   AST 59*   BILITOT 1.4*     Lab Results   Component  Value Date    WBC 27.45 (H) 07/22/2023    HGB 11.8 (L) 07/22/2023    HCT 35.3 (L) 07/22/2023    MCV 80 (L) 07/22/2023     07/22/2023     No results for input(s): TSH, FREET4 in the last 168 hours.  Lab Results   Component Value Date    HGBA1C 6.7 (H) 07/22/2023       Nutritional status:   Body mass index is 27.25 kg/m².  Lab Results   Component Value Date    ALBUMIN 2.2 (L) 07/22/2023    ALBUMIN 3.2 (L) 06/06/2023    ALBUMIN 3.1 (L) 06/05/2023     Lab Results   Component Value Date    PREALBUMIN 17 (L) 06/05/2023    PREALBUMIN 26 04/19/2023    PREALBUMIN 22 04/19/2023       Estimated Creatinine Clearance: 85.5 mL/min (based on SCr of 1.4 mg/dL).    Accu-Checks  No results for input(s): POCTGLUCOSE in the last 72 hours.     ASSESSMENT and PLAN    Cardiac/Vascular  LVAD (left ventricular assist device) present  Managed per primary team  Avoid hypoglycemia        ID  * Severe sepsis  Managed per primary team        Endocrine  Type 2 diabetes mellitus with hyperglycemia  BG goal 140-180    Start Levemir 20 units daily (0.4 u/kg dosing)   Start Novolog 6 units TID with meals (basal/prandial match)  Continue Moderate Dose Correction Scale  BG monitoring ac/hs    ** Please call Endocrine for any BG related issues **    Discharge plans: TBD    Lab Results   Component Value Date    HGBA1C 6.7 (H) 07/22/2023                 Plan discussed with patient, family, and RN at bedside.     Jody Starkey NP  Endocrinology  [facility] - Surgical Intensive Care

## 2023-07-22 NOTE — CONSULTS
[facility] - Surgical Intensive Care  Adult Nutrition  Consult Note    SUMMARY     Recommendations    1. Continue Vegan Diet. Please adhere to patient choices as much as possible.   2. Recommend Zyncd Standard 1.4 with all meals to optimize nutrition.   3. Continue appetite stimulant.   4. Recommend MVI, B12, thiamine and probiotic supplementation.   5. Recommend continue routine weight measurements to assess for any acute weight changes.    6. RD to monitor and follow-up.    Goals: Meet % EEN/EPN by follow-up date.  Nutrition Goal Status: new  Communication of RD Recs: other (comment) (POC)    Assessment and Plan    Nutrition Problem  Inadequate oral intake     Related to (etiology):   Decreased ability to consume sufficient energy    Signs and Symptoms (as evidenced by):   Decreased appetite     Interventions/Recommendations (treatment strategy):  Collaboration of nutrition care with other providers  Vegan  Commercial beverages  Vitamin supplementation  Appetite stimulant    Nutrition Diagnosis Status:   New        Reason for Assessment    Reason For Assessment: consult  Diagnosis: infection/sepsis  Relevant Medical History: DM2 s/p DT-HM3, CHF, drug abuse, renal disorder, former tobacco use  Interdisciplinary Rounds: did not attend  General Information Comments: RD consulted to assess dietary needs. Patient is vegan, reports decreased appetite. RD suspects malnutrition, patient decline NFPE at this time will perform at a later date. wt fluc. noted. UBW ~224lbs.  Nutrition Discharge Planning: Pendigng Clinical Course    Nutrition Risk Screen    Nutrition Risk Screen: no indicators present    Nutrition/Diet History    Patient Reported Diet/Restrictions/Preferences: other (see comments) (Vegan)  Food Allergies: other (see comments) (Lactose)  Factors Affecting Nutritional Intake: decreased appetite    Anthropometrics    Temp: 99.1 °F (37.3 °C)  Weight Method: Bed Scale  Weight: 98.9 kg (218 lb 0.6  oz)  Weight (lb): 218.04 lb       Lab/Procedures/Meds    Pertinent Labs Reviewed: reviewed  Pertinent Labs Comments: Na 129, Cl 93, CO2 21, BUN 21, Glu 252, Alk 198, AST 57, ALT 45  Pertinent Medications Reviewed: reviewed  Pertinent Medications Comments: dexamethasone, lasix, remdesivir, keppra, mirtazapine, vancomycin        Estimated/Assessed Needs    Weight Used For Calorie Calculations: 98.9 kg (218 lb)  Energy Calorie Requirements (kcal): 1837-9003 kcal/d (18-23 kcal/kg)  Energy Need Method: Kcal/kg  Protein Requirements:  g/d (1.0-1.2 g/kg)  Weight Used For Protein Calculations: 98.9 kg (218 lb)        RDA Method (mL): 1780         Nutrition Prescription Ordered    Current Diet Order: Vegan    Evaluation of Received Nutrient/Fluid Intake    I/O: -590 mL  Comments: LBM: unknown  % Intake of Estimated Energy Needs: 75 - 100 %  % Meal Intake: 0 - 25 %    Nutrition Risk    Level of Risk/Frequency of Follow-up: moderate (1x/ week)       Monitor and Evaluation    Food and Nutrient Intake: energy intake, food and beverage intake  Food and Nutrient Adminstration: diet order  Knowledge/Beliefs/Attitudes: food and nutrition knowledge/skill, beliefs and attitudes  Physical Activity and Function: nutrition-related ADLs and IADLs, factors affecting access to physical activity  Anthropometric Measurements: weight, weight change, body mass index  Biochemical Data, Medical Tests and Procedures: inflammatory profile, glucose/endocrine profile, gastrointestinal profile, electrolyte and renal panel, lipid profile  Nutrition-Focused Physical Findings: skin, head and eyes, extremities, muscles and bones, overall appearance       Nutrition Follow-Up    RD Follow-up?: Yes    Lawson Eaton Registration Eligible, Provisional LDN

## 2023-07-22 NOTE — PROGRESS NOTES
07/22/2023  John Alaniz    Current provider:  Josh Pulido MD    Device interrogation:  TXP LVAD INTERROGATIONS 7/22/2023 7/22/2023 7/22/2023 7/22/2023 7/22/2023 7/22/2023 7/22/2023   Type HeartMate3 HeartMate3 HeartMate3 HeartMate3 HeartMate3 HeartMate3 HeartMate3   Flow 4.3 4.3 4.3 4.4 4.3 4.3 4.5   Speed 5100 5100 5100 5100 5100 5100 5100   PI 3.1 3.4 3.4 3.6 3.9 4.3 3.9   Power (Serna) 3.7 3.7 3.7 3.7 3.7 3.7 3.6   LSL 4700 4700 4700 4700 4700 4700 4700   Low Flow Alarm - - - - - - -   High Power Alarm - - - - - - -   Pulsatility Intermittent pulse Intermittent pulse Intermittent pulse Intermittent pulse Intermittent pulse Intermittent pulse Intermittent pulse          Rounded on Kevan Queen to ensure all mechanical assist device settings (IABP or VAD) were appropriate and all parameters were within limits.  I was able to ensure all back up equipment was present, the staff had no issues, and the Perfusion Department daily rounding was complete.      For implantable VADs: Interrogation of Ventricular assist device was performed with analysis of device parameters and review of device function. I have personally reviewed the interrogation findings and agree with findings as stated.     In emergency, the nursing units have been notified to contact the perfusion department either by:  Calling [id number] from 630am to 4pm Mon thru Fri, utilizing the On-Call Finder functionality of Epic and searching for Perfusion, or by contacting the hospital  from 4pm to 630am and on weekends and asking to speak with the perfusionist on call.    2:05 PM

## 2023-07-22 NOTE — ASSESSMENT & PLAN NOTE
Patient with Hypoxic Respiratory failure which is Acute.  he is not on home oxygen. Supplemental oxygen was provided and noted.    Signs/symptoms of respiratory failure include- respiratory distress. Contributing diagnoses includes - COVID-19 Labs and images were reviewed. Patient Has recent ABG, which has been reviewed.     Recommend     1.  Remdesivir X 5 days.    2. Dexamethasone X 10 days.    3. Management of his volume status per cardiology.    4. Currently on vancomycin and zosyn.  Okay to continue for now pending respiratory cultures.

## 2023-07-22 NOTE — AI DETERIORATION ALERT
RAPID RESPONSE NURSE AI ALERT       AI alert received.    Chart Reviewed: 07/22/2023, 1:31 AM    MRN: [id number]  Bed: 303/303 A    Dx: Severe sepsis    Kevan Queen has a past medical history of Arthritis, Awareness alteration, transient, Cardiomyopathy, CHF (congestive heart failure), COVID-19, Diabetes mellitus, Dilated cardiomyopathy, Drug abuse, Headache, Hyperglycemia, Hyperosmolar hyperglycemic state (HHS), ICD (implantable cardioverter-defibrillator) in place, Left ventricular assist device (LVAD) complication, initial encounter, Muscle cramping, Renal disorder, SOB (shortness of breath), and Tingling in extremities.    Last VS: BP (!) 82/0 (BP Location: Left arm, Patient Position: Lying)   Pulse (!) 136   Temp (!) 101.4 °F (38.6 °C) (Oral)   Resp (!) 24   SpO2 (!) 92%     24H Vital Sign Range:  Temp:  [101.4 °F (38.6 °C)]   Pulse:  [136]   Resp:  [24]   BP: (82)/(0)   SpO2:  [92 %]         OXYGEN:  Flow (L/min): 3          MEWS score:  7    charge RN, Marissa  contacted. Currently drawing labs, MD aware of VS. Instructed to call [phone] for further concerns or assistance.    RENEE Loredo RN

## 2023-07-22 NOTE — ASSESSMENT & PLAN NOTE
BG goal 140-180    Start Levemir 20 units daily (0.4 u/kg dosing)   Start Novolog 6 units TID with meals (basal/prandial match)  Continue Moderate Dose Correction Scale  BG monitoring ac/hs    ** Please call Endocrine for any BG related issues **    Discharge plans: TBD    Lab Results   Component Value Date    HGBA1C 6.7 (H) 07/22/2023

## 2023-07-22 NOTE — NURSING
MD Casandra notified of patient's HR sustaining in low 140's. Patient's oxygen requirement increasing, now on 5L NC maintaining O2 sats between 89-91%. Increased effort in breathing noted with use of abdominal muscles. Order for STAT EKG. EKG tech called and on their way. Dr. Guajardo at bedside to assess the patient.

## 2023-07-22 NOTE — PT/OT/SLP PROGRESS
Occupational Therapy      Patient Name:  Kevan Queen   MRN:  [id number]    Pt declined session this date due to 10/10 left flank/chest pain. Despite education re: role of OT and impacts of prolonged immobility, pt continued to decline and requested therapy to return 7/24/23. Nsg notified.   7/22/2023

## 2023-07-22 NOTE — ASSESSMENT & PLAN NOTE
Patient presents with elevated fevers, white blood cell count, elevated lactic acid.  Source likely pneumonia.  Will check for COVID  Will start him on broad-spectrum antibiotics with vancomycin and Zosyn.  Check blood cultures, urinalysis.  Will keep him on Tylenol for fevers and chest pain

## 2023-07-22 NOTE — PLAN OF CARE
SICU PLAN OF CARE NOTE    Dx: Severe sepsis    Goals of Care: Doppler pressure <90    Vital Signs:  BP (!) 84/0 (BP Location: Left arm)   Pulse (!) 121   Temp (!) 102.3 °F (39.1 °C) (Oral)   Resp (!) 37   Wt 98.9 kg (218 lb 0.6 oz)   SpO2 (!) 93%   BMI 27.25 kg/m²     Cardiac:  NSR    Resp:  SpO2 93% on 5L nasal cannula     Neuro:  AAO x4, Follows Commands, and Moves All Extremities    Gtts:  Milrinone    Urine Output:  Voids Spontaneously 1,550 cc/shift    Diet:  Cardiac Diet    VAD     Speed: 5100  Flow: 4.3-4.5  Power: 3.6-3.7  LSL: 4700  Labs/Accuchecks:  Protime-INR, accuchecks ACHS    Skin:  All skin remains free from injury, VAD dressing CDI, patient changes position independently, waffle mattress inflated, ICU bed working correctly.    Shift Events: See flowsheet for further assessment/details.  Family updated on current condition/plan of care, questions answered, and emotional support provided.  MD updated on current condition, vitals, labs, and gtts.  No new orders received, will continue to monitor.

## 2023-07-22 NOTE — SUBJECTIVE & OBJECTIVE
Past Medical History:   Diagnosis Date    Arthritis     Awareness alteration, transient 9/1/2022    Cardiomyopathy     CHF (congestive heart failure) 10/01/2020    COVID-19 6/3/2023    Diabetes mellitus     Dilated cardiomyopathy 10/26/2020    Drug abuse 10/2020    Headache 4/19/2023    Hyperglycemia 12/16/2022    Hyperosmolar hyperglycemic state (HHS) 5/25/2022    ICD (implantable cardioverter-defibrillator) in place 10/26/2020    Left ventricular assist device (LVAD) complication, initial encounter 6/5/2023    Muscle cramping 6/15/2022    Renal disorder     SOB (shortness of breath) 6/13/2022    Tingling in extremities 7/13/2022       Past Surgical History:   Procedure Laterality Date    APPLICATION OF WOUND VACUUM-ASSISTED CLOSURE DEVICE N/A 6/30/2022    Procedure: APPLICATION, WOUND VAC;  Surgeon: Luis F Paige MD;  Location: [facility] OR [address] [place] [address];  Service: Cardiovascular;  Laterality: N/A;  50 x 5 cm    CARDIAC DEFIBRILLATOR PLACEMENT      IMPLANTATION OF RIGHT VENTRICULAR ASSIST DEVICE (RVAD) N/A 6/29/2022    Procedure: INSERTION, RVAD;  Surgeon: Luis F Paige MD;  [facility];  Service: Cardiovascular;  Laterality: N/A;    INSERTION OF GRAFT TO PERICARDIUM Right 6/30/2022    Procedure: INSERTION-RIGHT VENTRICULAR ASSIST DEVICE;  Surgeon: Luis F Paige MD;  [facility];  Service: Cardiovascular;  Laterality: Right;    IRRIGATION OF MEDIASTINUM  6/30/2022    Procedure: IRRIGATION, MEDIASTINUM;  Surgeon: Luis F Paige MD;  [facility];  Service: Cardiovascular;;    LEFT VENTRICULAR ASSIST DEVICE Left 6/23/2022    Procedure: INSERTION-LEFT VENTRICULAR ASSIST DEVICE;  Surgeon: Luis F Paige MD;  [facility];  Service: Cardiovascular;  Laterality: Left;    LEFT VENTRICULAR ASSIST DEVICE N/A 6/29/2022    Procedure: INSERTION-LEFT VENTRICULAR ASSIST DEVICE;  Surgeon: Luis F Paige MD;  [facility];  Service: Cardiovascular;  Laterality: N/A;     RIGHT HEART CATHETERIZATION Right 4/8/2022    Procedure: INSERTION, CATHETER, RIGHT HEART;  Surgeon: Luca Lopez Jr., MD;  Location: [facility] CATH LAB;  Service: Cardiology;  Laterality: Right;    RIGHT HEART CATHETERIZATION Right 4/19/2022    Procedure: INSERTION, CATHETER, RIGHT HEART;  Surgeon: Josh Pulido MD;  Location: [facility] CATH LAB;  Service: Cardiology;  Laterality: Right;    RIGHT HEART CATHETERIZATION Right 7/21/2022    Procedure: INSERTION, CATHETER, RIGHT HEART;  Surgeon: Dalia Crum MD;  Location: [facility] CATH LAB;  Service: Cardiology;  Laterality: Right;    RIGHT HEART CATHETERIZATION Right 10/31/2022    Procedure: INSERTION, CATHETER, RIGHT HEART;  Surgeon: Dalia Crum MD;  Location: [facility] CATH LAB;  Service: Cardiology;  Laterality: Right;    STERNAL WOUND CLOSURE N/A 6/30/2022    Procedure: CLOSURE, WOUND, STERNUM;  Surgeon: Luis F Paige MD;  Location: [facility] OR [address] [place] [address];  Service: Cardiovascular;  Laterality: N/A;       Review of patient's allergies indicates:   Allergen Reactions    Aspirin Other (See Comments)     Mr. Thacker is enrolled in Dr. Paige's Alon Trial and cannot have any aspirin and/or aspirin-containing products. DO NOT cancel any orders for INV Aspirin 100 mg/Placebo. If you have questions, please contact Isabel @ [phone], [phone],[email or web address], secure chat or MS Teams message.    Bumex [bumetanide] Hives    Lactose Diarrhea     Other reaction(s): Abdominal distension, gaseous    Torsemide Hives       Medications:  Medications Prior to Admission   Medication Sig    blood sugar diagnostic Strp Use to test blood glucose 4 (four) times daily.    blood-glucose meter Misc Use as instructed    busPIRone (BUSPAR) 10 MG tablet Take 10 mg by mouth 2 (two) times daily.    cyclobenzaprine (FEXMID) 7.5 MG Tab Take 7.5 mg by mouth every 8 (eight) hours as needed.    doxycycline (VIBRAMYCIN) 100 MG Cap Take 1 capsule (100 mg total) by mouth 2 (two) times daily.     furosemide (LASIX) 80 MG tablet Take 1 tablet (80 mg total) by mouth once daily.    insulin aspart U-100 (NOVOLOG) 100 unit/mL (3 mL) InPn pen Inject 16 Units into the skin 3 (three) times daily with meals. Plus Sliding Scale: 151-200: +1, 201-250: +2, 251-300: +3, 301-350: +4 and call MD; Max Daily Dose: 60 units    insulin detemir U-100, Levemir, 100 unit/mL (3 mL) SubQ InPn pen Inject 22 Units into the skin 2 (two) times daily.    INV ASPIRIN 100MG/PLACEBO Take 1 capsule (100 mg) by mouth once daily. FOR INVESTIGATIONAL USE ONLY. Protocol: GASTON III subject # 1044.    lancets 33 gauge Misc Use to test blood glucose 4 (four) times daily.    levETIRAcetam (KEPPRA) 1000 MG tablet Take 1 tablet (1,000 mg total) by mouth 2 (two) times daily.    milrinone (PRIMACOR) 1 mg/mL injection Inject into the vein.    milrinone 20mg/100ml D5W, 200mcg/ml, (PRIMACOR) 20 mg/100 mL (200 mcg/mL) infusion Inject 34.875 mcg/min into the vein continuous.    mirtazapine (REMERON) 15 MG tablet Take 1 tablet (15 mg total) by mouth nightly.    potassium chloride (K-TAB) 20 mEq Take 40 mEq by mouth 3 (three) times daily.    spironolactone (ALDACTONE) 25 MG tablet Take 1 tablet (25 mg total) by mouth once daily.    valsartan (DIOVAN) 40 MG tablet Take 1 tablet (40 mg total) by mouth 2 (two) times daily.    warfarin (COUMADIN) 3 MG tablet Take 1.5 tablets (4.5mg) orally daily, except 2 tablets (6mg) on Wednesdays and saturdays    warfarin (COUMADIN) 3 MG tablet Take 1.5-2 tablets (4.5-6 mg total) by mouth Daily.     Antibiotics (From admission, onward)      Start     Stop Route Frequency Ordered    07/22/23 1530  vancomycin 1,500 mg in dextrose 5 % (D5W) 250 mL IVPB (Vial-Mate)         -- IV Every 12 hours (non-standard times) 07/22/23 0352    07/22/23 0143  vancomycin - pharmacy to dose  (vancomycin IVPB (PEDS and ADULTS))        See Hyperspace for full Linked Orders Report.    -- IV pharmacy to manage frequency 07/22/23 0044    07/22/23  0130  piperacillin-tazobactam (ZOSYN) 4.5 g in dextrose 5 % in water (D5W) 5 % 100 mL IVPB (MB+)         -- IV Every 8 hours (non-standard times) 23 0044          Antifungals (From admission, onward)      None          Antivirals (From admission, onward)          Stop Route Frequency     remdesivir 100 mg        See Hyperspace for full Linked Orders Report.     0859 IV Daily             Immunization History   Administered Date(s) Administered    COVID-19, MRNA, LN-S, PF (Pfizer) (Purple Cap) 2021, 2021    DTP 1985, 1985, 1985, 1987    HIB 1990    Influenza - Quadrivalent - PF *Preferred* (6 months and older) 10/16/2020, 2021, 2022    MMR 1987    OPV 1985, 1985, 1987       Family History       Problem Relation (Age of Onset)    Diverticulosis Brother    Heart attack Maternal Grandmother, Maternal Grandfather    Heart failure Father          Social History     Socioeconomic History    Marital status: Legally    Tobacco Use    Smoking status: Former     Packs/day: 0.50     Years: 16.00     Pack years: 8.00     Types: Cigarettes     Start date: 10/1/2004     Quit date: 2021     Years since quittin.2    Smokeless tobacco: Never   Substance and Sexual Activity    Alcohol use: Not Currently    Drug use: Not Currently     Types: Marijuana, MDMA (Ecstacy)    Sexual activity: Yes     Partners: Female     Birth control/protection: None     Review of Systems   Constitutional:  Positive for fatigue.   Respiratory:  Positive for shortness of breath.    Cardiovascular:  Positive for chest pain.   All other systems reviewed and are negative.  Objective:     Vital Signs (Most Recent):  Temp: 99.1 °F (37.3 °C) (23 1102)  Pulse: (!) 127 (23 1315)  Resp: (!) 30 (23 1315)  BP: (!) 85/0 (23 1102)  SpO2: (!) 93 % (23 0500) Vital Signs (24h Range):  Temp:  [99 °F (37.2 °C)-101.4 °F (38.6 °C)] 99.1 °F  (37.3 °C)  Pulse:  [117-142] 127  Resp:  [19-50] 30  SpO2:  [87 %-93 %] 93 %  BP: (78-85)/(0) 85/0     Weight: 98.9 kg (218 lb 0.6 oz)  Body mass index is 27.25 kg/m².    Estimated Creatinine Clearance: 85.5 mL/min (based on SCr of 1.4 mg/dL).     Physical Exam  Vitals and nursing note reviewed.   Constitutional:       Appearance: He is ill-appearing.   Eyes:      General: No scleral icterus.        Right eye: No discharge.         Left eye: No discharge.   Pulmonary:      Effort: No respiratory distress.   Abdominal:      General: There is no distension.      Comments: Dressings covering LVAD drive line exit site.   Lymphadenopathy:      Cervical: No cervical adenopathy.   Neurological:      General: No focal deficit present.      Mental Status: He is alert and oriented to person, place, and time.        Significant Labs:   Microbiology Results (last 7 days)       Procedure Component Value Units Date/Time    Blood culture [[id number]] Collected: 07/22/23 1058    Order Status: Sent Specimen: Blood from Peripheral, Hand, Right Updated: 07/22/23 1205    Culture, Anaerobe [[id number]] Collected: 07/22/23 0159    Order Status: Sent Specimen: Abscess from Abdomen Updated: 07/22/23 0551    Aerobic culture [[id number]] Collected: 07/22/23 0159    Order Status: Sent Specimen: Abscess from Abdomen Updated: 07/22/23 0551    Blood culture [[id number]]     Order Status: Sent Specimen: Blood     Blood culture [[id number]]     Order Status: Sent Specimen: Blood             Significant Imaging: I have reviewed all pertinent imaging results/findings within the past 24 hours.

## 2023-07-22 NOTE — CONSULTS
[facility] - Surgical Intensive Care  Infectious Disease  Consult Note    Patient Name: Kevan Queen  MRN: [id number]  Admission Date: 7/22/2023  Hospital Length of Stay: 0 days  Attending Physician: Josh Pulido MD  Primary Care Provider: ORALIA Cline     Isolation Status: No active isolations    Patient information was obtained from patient, past medical records and ER records.      Inpatient consult to Infectious Diseases  Consult performed by: Benny Paul MD  Consult ordered by: Gilles Hill MD        Assessment/Plan:     Pulmonary  Acute respiratory failure with hypoxia  Patient with Hypoxic Respiratory failure which is Acute.  he is not on home oxygen. Supplemental oxygen was provided and noted.    Signs/symptoms of respiratory failure include- respiratory distress. Contributing diagnoses includes - COVID-19 Labs and images were reviewed. Patient Has recent ABG, which has been reviewed.     Recommend     1.  Remdesivir X 5 days.    2. Dexamethasone X 10 days.    3. Management of his volume status per cardiology.    4. Currently on vancomycin and zosyn.  Okay to continue for now pending respiratory cultures.      Cardiac/Vascular  LVAD (left ventricular assist device) present  Prior history of MSSA infection of the drive line.  He completed doxycycline.  Unable to examine the drive line today.  If feasible, please take a picture of the site at the time of his next dressing change.  He is currently on IV vancomycin which is active against MSSA as well.        Thank you for your consult. I will follow-up with patient. Please contact us if you have any additional questions.    Benny Paul MD  Infectious Disease  [facility] - Surgical Intensive Care    Subjective:     Principal Problem: Severe sepsis    HPI: 38 year old male with a history of CHF s/p LVAD placement in June of 2022.  He was recently admitted to the hospital in June of 2023 with COVID-19 infection and MSSA infection of his  LVAD drive line exit site.  He was apparently on room air for most of that hospital stay.  He received 3 days of remdesivir.  He was discharged on oral doxycycline to complete a 14 day course for the MSSA drive line infection.  He had tested negative for COVID-19 following his treatment.  He is re-admitted with complaints of shortness of breath, chest pains and generalized ill feeling. COVID-19 testing is positive again.  Chest x-ray shows diffuse infiltrates bilaterally.  ID is consulted to assist with his management.        Past Medical History:   Diagnosis Date    Arthritis     Awareness alteration, transient 9/1/2022    Cardiomyopathy     CHF (congestive heart failure) 10/01/2020    COVID-19 6/3/2023    Diabetes mellitus     Dilated cardiomyopathy 10/26/2020    Drug abuse 10/2020    Headache 4/19/2023    Hyperglycemia 12/16/2022    Hyperosmolar hyperglycemic state (HHS) 5/25/2022    ICD (implantable cardioverter-defibrillator) in place 10/26/2020    Left ventricular assist device (LVAD) complication, initial encounter 6/5/2023    Muscle cramping 6/15/2022    Renal disorder     SOB (shortness of breath) 6/13/2022    Tingling in extremities 7/13/2022       Past Surgical History:   Procedure Laterality Date    APPLICATION OF WOUND VACUUM-ASSISTED CLOSURE DEVICE N/A 6/30/2022    Procedure: APPLICATION, WOUND VAC;  Surgeon: Luis F Paige MD;  Location: [address];  Service: Cardiovascular;  Laterality: N/A;  50 x 5 cm    CARDIAC DEFIBRILLATOR PLACEMENT      IMPLANTATION OF RIGHT VENTRICULAR ASSIST DEVICE (RVAD) N/A 6/29/2022    Procedure: INSERTION, RVAD;  Surgeon: Luis F Paige MD;  Location: [facility] OR [address] [place] [address];  Service: Cardiovascular;  Laterality: N/A;    INSERTION OF GRAFT TO PERICARDIUM Right 6/30/2022    Procedure: INSERTION-RIGHT VENTRICULAR ASSIST DEVICE;  Surgeon: Luis F Paige MD;  Location: [facility] OR [address] [place] [address];  Service: Cardiovascular;  Laterality: Right;    IRRIGATION OF  MEDIASTINUM  6/30/2022    Procedure: IRRIGATION, MEDIASTINUM;  Surgeon: Luis F Paige MD;  Location: [facility] OR [facility];  Service: Cardiovascular;;    LEFT VENTRICULAR ASSIST DEVICE Left 6/23/2022    Procedure: INSERTION-LEFT VENTRICULAR ASSIST DEVICE;  Surgeon: Luis F Paige MD;  Location: [facility] OR [facility];  Service: Cardiovascular;  Laterality: Left;    LEFT VENTRICULAR ASSIST DEVICE N/A 6/29/2022    Procedure: INSERTION-LEFT VENTRICULAR ASSIST DEVICE;  Surgeon: Luis F Paige MD;  Location: [facility] OR [facility];  Service: Cardiovascular;  Laterality: N/A;    RIGHT HEART CATHETERIZATION Right 4/8/2022    Procedure: INSERTION, CATHETER, RIGHT HEART;  Surgeon: Luca Lopez Jr., MD;  Location: [facility] CATH LAB;  Service: Cardiology;  Laterality: Right;    RIGHT HEART CATHETERIZATION Right 4/19/2022    Procedure: INSERTION, CATHETER, RIGHT HEART;  Surgeon: Josh Pulido MD;  Location: [facility] CATH LAB;  Service: Cardiology;  Laterality: Right;    RIGHT HEART CATHETERIZATION Right 7/21/2022    Procedure: INSERTION, CATHETER, RIGHT HEART;  Surgeon: Dalia Crum MD;  Location: [facility] CATH LAB;  Service: Cardiology;  Laterality: Right;    RIGHT HEART CATHETERIZATION Right 10/31/2022    Procedure: INSERTION, CATHETER, RIGHT HEART;  Surgeon: Dalia Crum MD;  Location: [facility] CATH LAB;  Service: Cardiology;  Laterality: Right;    STERNAL WOUND CLOSURE N/A 6/30/2022    Procedure: CLOSURE, WOUND, STERNUM;  Surgeon: Luis F Paige MD;  Location: [facility] OR [facility];  Service: Cardiovascular;  Laterality: N/A;       Review of patient's allergies indicates:   Allergen Reactions    Aspirin Other (See Comments)     Mr. Thacker is enrolled in Dr. Paige's Alon Trial and cannot have any aspirin and/or aspirin-containing products. DO NOT cancel any orders for INV Aspirin 100 mg/Placebo. If you have questions, please contact Isabel @ [phone], [phone],[email or web address], secure chat or MS Teams message.    Bumex  [bumetanide] Hives    Lactose Diarrhea     Other reaction(s): Abdominal distension, gaseous    Torsemide Hives       Medications:  Medications Prior to Admission   Medication Sig    blood sugar diagnostic Strp Use to test blood glucose 4 (four) times daily.    blood-glucose meter Misc Use as instructed    busPIRone (BUSPAR) 10 MG tablet Take 10 mg by mouth 2 (two) times daily.    cyclobenzaprine (FEXMID) 7.5 MG Tab Take 7.5 mg by mouth every 8 (eight) hours as needed.    doxycycline (VIBRAMYCIN) 100 MG Cap Take 1 capsule (100 mg total) by mouth 2 (two) times daily.    furosemide (LASIX) 80 MG tablet Take 1 tablet (80 mg total) by mouth once daily.    insulin aspart U-100 (NOVOLOG) 100 unit/mL (3 mL) InPn pen Inject 16 Units into the skin 3 (three) times daily with meals. Plus Sliding Scale: 151-200: +1, 201-250: +2, 251-300: +3, 301-350: +4 and call MD; Max Daily Dose: 60 units    insulin detemir U-100, Levemir, 100 unit/mL (3 mL) SubQ InPn pen Inject 22 Units into the skin 2 (two) times daily.    INV ASPIRIN 100MG/PLACEBO Take 1 capsule (100 mg) by mouth once daily. FOR INVESTIGATIONAL USE ONLY. Protocol: GASTON III subject # 5884.    lancets 33 gauge Misc Use to test blood glucose 4 (four) times daily.    levETIRAcetam (KEPPRA) 1000 MG tablet Take 1 tablet (1,000 mg total) by mouth 2 (two) times daily.    milrinone (PRIMACOR) 1 mg/mL injection Inject into the vein.    milrinone 20mg/100ml D5W, 200mcg/ml, (PRIMACOR) 20 mg/100 mL (200 mcg/mL) infusion Inject 34.875 mcg/min into the vein continuous.    mirtazapine (REMERON) 15 MG tablet Take 1 tablet (15 mg total) by mouth nightly.    potassium chloride (K-TAB) 20 mEq Take 40 mEq by mouth 3 (three) times daily.    spironolactone (ALDACTONE) 25 MG tablet Take 1 tablet (25 mg total) by mouth once daily.    valsartan (DIOVAN) 40 MG tablet Take 1 tablet (40 mg total) by mouth 2 (two) times daily.    warfarin (COUMADIN) 3 MG tablet Take 1.5 tablets  (4.5mg) orally daily, except 2 tablets (6mg) on  and     warfarin (COUMADIN) 3 MG tablet Take 1.5-2 tablets (4.5-6 mg total) by mouth Daily.     Antibiotics (From admission, onward)      Start     Stop Route Frequency Ordered    23 1530  vancomycin 1,500 mg in dextrose 5 % (D5W) 250 mL IVPB (Vial-Mate)         -- IV Every 12 hours (non-standard times) 23 0352    23 0143  vancomycin - pharmacy to dose  (vancomycin IVPB (PEDS and ADULTS))        See Hyperspace for full Linked Orders Report.    -- IV pharmacy to manage frequency 23 0044    23 0130  piperacillin-tazobactam (ZOSYN) 4.5 g in dextrose 5 % in water (D5W) 5 % 100 mL IVPB (MB+)         -- IV Every 8 hours (non-standard times) 23 0044          Antifungals (From admission, onward)      None          Antivirals (From admission, onward)          Stop Route Frequency     remdesivir 100 mg        See Hyperspace for full Linked Orders Report.     0859 IV Daily             Immunization History   Administered Date(s) Administered    COVID-19, MRNA, LN-S, PF (Pfizer) (Purple Cap) 2021, 2021    DTP 1985, 1985, 1985, 1987    HIB 1990    Influenza - Quadrivalent - PF *Preferred* (6 months and older) 10/16/2020, 2021, 2022    MMR 1987    OPV 1985, 1985, 1987       Family History       Problem Relation (Age of Onset)    Diverticulosis Brother    Heart attack Maternal Grandmother, Maternal Grandfather    Heart failure Father          Social History     Socioeconomic History    Marital status: Legally    Tobacco Use    Smoking status: Former     Packs/day: 0.50     Years: 16.00     Pack years: 8.00     Types: Cigarettes     Start date: 10/1/2004     Quit date: 2021     Years since quittin.2    Smokeless tobacco: Never   Substance and Sexual Activity    Alcohol use: Not Currently    Drug use: Not Currently      Types: Marijuana, MDMA (Ecstacy)    Sexual activity: Yes     Partners: Female     Birth control/protection: None     Review of Systems   Constitutional:  Positive for fatigue.   Respiratory:  Positive for shortness of breath.    Cardiovascular:  Positive for chest pain.   All other systems reviewed and are negative.  Objective:     Vital Signs (Most Recent):  Temp: 99.1 °F (37.3 °C) (07/22/23 1102)  Pulse: (!) 127 (07/22/23 1315)  Resp: (!) 30 (07/22/23 1315)  BP: (!) 85/0 (07/22/23 1102)  SpO2: (!) 93 % (07/22/23 0500) Vital Signs (24h Range):  Temp:  [99 °F (37.2 °C)-101.4 °F (38.6 °C)] 99.1 °F (37.3 °C)  Pulse:  [117-142] 127  Resp:  [19-50] 30  SpO2:  [87 %-93 %] 93 %  BP: (78-85)/(0) 85/0     Weight: 98.9 kg (218 lb 0.6 oz)  Body mass index is 27.25 kg/m².    Estimated Creatinine Clearance: 85.5 mL/min (based on SCr of 1.4 mg/dL).     Physical Exam  Vitals and nursing note reviewed.   Constitutional:       Appearance: He is ill-appearing.   Eyes:      General: No scleral icterus.        Right eye: No discharge.         Left eye: No discharge.   Pulmonary:      Effort: No respiratory distress.   Abdominal:      General: There is no distension.      Comments: Dressings covering LVAD drive line exit site.   Lymphadenopathy:      Cervical: No cervical adenopathy.   Neurological:      General: No focal deficit present.      Mental Status: He is alert and oriented to person, place, and time.        Significant Labs:   Microbiology Results (last 7 days)       Procedure Component Value Units Date/Time    Blood culture [[id number]] Collected: 07/22/23 1058    Order Status: Sent Specimen: Blood from Peripheral, Hand, Right Updated: 07/22/23 1205    Culture, Anaerobe [[id number]] Collected: 07/22/23 0159    Order Status: Sent Specimen: Abscess from Abdomen Updated: 07/22/23 0551    Aerobic culture [[id number]] Collected: 07/22/23 0159    Order Status: Sent Specimen: Abscess from Abdomen Updated: 07/22/23 0551    Blood  culture [[id number]]     Order Status: Sent Specimen: Blood     Blood culture [[id number]]     Order Status: Sent Specimen: Blood             Significant Imaging: I have reviewed all pertinent imaging results/findings within the past 24 hours.

## 2023-07-22 NOTE — ASSESSMENT & PLAN NOTE
Prior history of MSSA infection of the drive line.  He completed doxycycline.  Unable to examine the drive line today.  If feasible, please take a picture of the site at the time of his next dressing change.  He is currently on IV vancomycin which is active against MSSA as well.

## 2023-07-22 NOTE — PT/OT/SLP PROGRESS
Physical Therapy      Patient Name:  Kevan Queen   MRN:  [id number]    Patient not seen today secondary to  (pt refused stating that he was in 10/10 pain in L flank and refused all therapy. Pt stated that he needed to rest and would participate on Monday. RN notified of such.). Will follow-up at a later date.    7/22/2023  .

## 2023-07-22 NOTE — PROGRESS NOTES
[facility] - Surgical Intensive Care  Transplant Heart  Progress Note    Patient Name: Kevan Queen  MRN: [id number]  Admission Date: 7/22/2023  Hospital Length of Stay: 0 days  Code Status: Full Code   Attending Physician: Josh Pulido MD   Expected Discharge Date:       Subjective / Interval   Afebrile since he's been in  the CIU    Objective     Vitals:    07/22/23 0900 07/22/23 1000 07/22/23 1044 07/22/23 1102   BP:    (!) 85/0   BP Location:       Patient Position:       Pulse: (!) 123 (!) 123  (!) 127   Resp: (!) 50 (!) 39 (!) 50 (!) 37   Temp:    99.1 °F (37.3 °C)   TempSrc:    Oral   SpO2:       Weight:           I/O last 3 completed shifts:  In: 659.6 [I.V.:61.3; IV Piggyback:598.4]  Out: 1250 [Urine:1250]  I/O this shift:  In: 112.9 [I.V.:40.2; IV Piggyback:72.7]  Out: 1200 [Urine:1200]         Physical Examination: General appearance - alert, well appearing, and in no distress  Mental status - alert, oriented to person, place, and time  Neck - JVD present  Chest - clear to auscultation, no wheezes, rales or rhonchi, symmetric air entry  Heart - normal rate, regular rhythm, normal S1, S2, no murmurs, rubs, clicks or gallops,   Abdomen - soft, nontender, nondistended, no masses or organomegaly  Neurological - alert, oriented, normal speech, no focal findings or movement disorder noted  Extremities - BLE edema Trace      Labs  BMP  Lab Results   Component Value Date     (L) 07/22/2023    K 4.0 07/22/2023    CL 93 (L) 07/22/2023    CO2 21 (L) 07/22/2023    BUN 21 (H) 07/22/2023    CREATININE 1.4 07/22/2023    CALCIUM 8.8 07/22/2023    ANIONGAP 15 07/22/2023    ESTGFRAFRICA >60.0 07/27/2022    EGFRNONAA 54.2 (A) 07/27/2022       Lab Results   Component Value Date    WBC 27.45 (H) 07/22/2023    HGB 11.8 (L) 07/22/2023    HCT 35.3 (L) 07/22/2023    MCV 80 (L) 07/22/2023     07/22/2023         BNP  Recent Labs   Lab 07/22/23  0248   *       Lab Results   Component Value Date    ALT 45  (H) 07/22/2023    AST 59 (H) 07/22/2023    ALKPHOS 198 (H) 07/22/2023    BILITOT 1.4 (H) 07/22/2023       Lab Results   Component Value Date    CHOL 171 02/02/2023    CHOL 261 (H) 04/18/2022    CHOL 121 10/16/2020     Lab Results   Component Value Date    HDL 35 (L) 02/02/2023    HDL 54 04/18/2022    HDL 35 10/16/2020     Lab Results   Component Value Date    LDLCALC 67.6 02/02/2023    LDLCALC Invalid, Trig>400.0 04/18/2022     Lab Results   Component Value Date    TRIG 342 (H) 02/02/2023    TRIG 496 (H) 04/18/2022    TRIG 82 10/16/2020     Lab Results   Component Value Date    CHOLHDL 20.5 02/02/2023    CHOLHDL 20.7 04/18/2022       Cardiac studies  Cardiac studies  Results for orders placed during the hospital encounter of 04/19/23    Echo    Interpretation Summary  · There is an LVAD present. Base speed is 5100 RPMs. The pump type is a Heartmate III. The interventricular septum appears midline. The aortic valve does not open.  · The left ventricle is severely enlarged with severely decreased systolic function. The estimated ejection fraction is 10%.  · Severe right ventricular enlargement with moderately to severely reduced right ventricular systolic function.  · Indeterminate left ventricular diastolic function.  · Biatrial enlargement.  · Moderate to severe tricuspid regurgitation.  · Mild-to-moderate mitral regurgitation.  · The estimated PA systolic pressure is 44 mmHg.  · Elevated central venous pressure (15 mmHg).      Results for orders placed or performed during the hospital encounter of 07/22/23   EKG 12-lead    Collection Time: 07/22/23  2:50 AM    Narrative    Test Reason : R07.9,    Vent. Rate : 141 BPM     Atrial Rate : 000 BPM     P-R Int : 000 ms          QRS Dur : 194 ms      QT Int : 340 ms       P-R-T Axes : 000 -36 107 degrees     QTc Int : 520 ms    ST with short OH  Left axis deviation  Left bundle branch block like IVCD  Cannot exclude anterior and inferior infarcts vs due to IVCD  Abnormal  ECG  When compared with ECG of 18-APR-2023 21:07,  Rate has increased  Confirmed by Hill ALLEN MD ([id number]) on 7/22/2023 8:18:03 AM    Referred By: WADE MENDEZ           Confirmed By:Hill ALLEN MD             Assessment / Plan:   Patient is a 38 yo black male with stage d HFrEF, NICM, ? 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 He underwent RVAD removal and chest closure 6/30/2022.  He was weaned off  but he had to restarted due to RVF. He was eventually transitioned to milrinone (secondary to  shortage) now on 0.25 mcg/kg/min.  Patient also has history of driveline infection.  Patient presented to the emergency room today because of pleuritic chest pain that has been going on for the last 3 days however it was more intense today.  Also complains of having shortness of breath and fevers associated with it.  He was spiking fever of 102 in the emergency room with elevated white blood cell count up to 17,000. He was treated with Zosyn.  He was sent here for admission.  Patient was admitted a month back for COVID infection for which he received remdesivir for 3 days.  He is on doxycycline for chronic driveline infection.  He is on Milrinone for RV failure.  Patient denies having any low flow alarms on the pump.  Also denies having any lower extremity edema, increased discharge from his driveline site.  Patient has ground-glass opacities on imaging from outside hospital.      * Severe sepsis  Patient presents with elevated fevers, white blood cell count, elevated lactic acid.  Source likely pneumonia.  Will check for COVID  Will start him on broad-spectrum antibiotics with vancomycin and Zosyn.  Check blood cultures, urinalysis.  Will keep him on Tylenol for fevers and chest pain     CAP (community acquired pneumonia)  Covid +  Patient has  ground-glass opacities with consolidation findings on imaging from outside hospital.  Will get a chest x-ray here.  Also complains of having pleuritic chest pain which is worse on deep inspirations  Currently spiking fevers.  -Continue Vanc and Zosyn  -ID consulted, may need remdesivir + steroids        Acute hypoxic respiratory failure  Likely from pneumonia.  Currently requiring 3 L to keep his sats above 92.        Seizure-like activity  On on Keppra     LVAD (left ventricular assist device) present  Procedure: Device Interrogation Including analysis of device parameters  Current Settings: Ventricular Assist Device  Review of device function is stable/unstable stable     TXP LVAD INTERROGATIONS 7/22/2023 7/22/2023 7/22/2023 7/22/2023 7/22/2023 7/22/2023 7/22/2023   Type HeartMate3 HeartMate3 HeartMate3 HeartMate3 HeartMate3 HeartMate3 -   Flow 4.3 4.4 4.3 4.3 4.5 4.5 4.4   Speed 5100 5100 5100 5100 5100 5100 5100   PI 3.4 3.6 3.9 4.3 3.9 3.6 3.8   Power (Serna) 3.7 3.7 3.7 3.7 3.6 3.7 3.6   LSL 4700 4700 4700 4700 4700 4700 4700   Low Flow Alarm - - - - - - -   High Power Alarm - - - - - - -   Pulsatility Intermittent pulse Intermittent pulse Intermittent pulse Intermittent pulse Intermittent pulse Intermittent pulse -   }       On coumadin for for anticoagulation.  Check INR  On Milrinone and oral Lasix for RV failure     Type 2 diabetes mellitus with hyperglycemia  Will keep him on sliding scale and a.c. HS     Chronic combined systolic and diastolic heart failure  Status post LVAD.  On GDMT with valsartan and Aldactone        Gilles Hill MD  Cardiovascular diseases  Transplant Heart  [facility] - Surgical Intensive Care

## 2023-07-22 NOTE — NURSING
Patient arrived to the unit. Telemetry box applied and vital signs documented in flow sheet. PICC line in place on right arm with milrinone infusing on patient's home infusion pump. MD Casandra notified. Identification band placed on patient. Oriented to room, call bell within reach, bed is in low lock position.

## 2023-07-22 NOTE — CODE/ RAPID DOCUMENTATION
RAPID RESPONSE NURSE NOTE        Admit Date: 2023  LOS: 0  Code Status: Full Code   Date of Consult: 2023  : 1985  Age: 38 y.o.  Weight:   Wt Readings from Last 1 Encounters:   23 100.4 kg (221 lb 6.4 oz)     Sex: male  Race: Black or    [facility]   MRN: [id number]  Time Rapid Response Team page Received: 0236  Time Rapid Response Team at Bedside: 0240  Time Rapid Response Team left Bedside: 0305  Was the patient discharged from an ICU this admission? No   Was the patient discharged from a PACU within last 24 hours? No   Did the patient receive conscious sedation/general anesthesia in last 24 hours? No  Was the patient in the ED within the past 24 hours? No  Was the patient on NIPPV within the past 24 hours? No   Did this progress into an ARC or CPA: No  Attending Physician: Josh Pulido MD  Primary Service: [facility] HEART TRANSPLANT TEAM 1       SITUATION    Notified by charge RN via phone call.  Reason for alert: tachycardia, tachypnea  Called to evaluate the patient for Circulatory    BACKGROUND     Why is the patient in the hospital?: Severe sepsis    Patient has a past medical history of Arthritis, Awareness alteration, transient, Cardiomyopathy, CHF (congestive heart failure), COVID-19, Diabetes mellitus, Dilated cardiomyopathy, Drug abuse, Headache, Hyperglycemia, Hyperosmolar hyperglycemic state (HHS), ICD (implantable cardioverter-defibrillator) in place, Left ventricular assist device (LVAD) complication, initial encounter, Muscle cramping, Renal disorder, SOB (shortness of breath), and Tingling in extremities.    Last Vitals:  Temp: 99.6 °F (37.6 °C) ( 030)  Pulse: 138 ( 0315)  Resp: 37 ( 030)  BP: 78/0 ( 0245)  SpO2: 90 % (245)    24 Hours Vitals Range:  Temp:  [99.6 °F (37.6 °C)-101.4 °F (38.6 °C)]   Pulse:  [136-142]   Resp:  [24-37]   BP: (78-82)/(0)   SpO2:  [90 %-92 %]     Labs:  Recent Labs     23  0140   WBC  27.45*   HGB 11.8*   HCT 35.3*          Recent Labs     07/22/23  0140   *  132*   K 4.5  4.5   CL 93*  94*   CO2 24  24   CREATININE 1.1  1.2   *  165*   PHOS 3.4   MG 1.7        No results for input(s): PH, PCO2, PO2, HCO3, POCSATURATED, BE in the last 72 hours.     ASSESSMENT    Physical Exam  Constitutional:       General: He is awake. He is in acute distress.      Appearance: He is ill-appearing.   Cardiovascular:      Rate and Rhythm: Tachycardia present.   Pulmonary:      Effort: Tachypnea and accessory muscle usage present.      Breath sounds: Rales present.   Skin:     General: Skin is warm.      Capillary Refill: Capillary refill takes more than 3 seconds.   Neurological:      Mental Status: He is oriented to person, place, and time.       BP 78/0 doppler  RR 38, SpO2 90% on 5L NC  Temp 101.4    Patient lying in bed, propped upright. Awake and conversant, however cannot complete sentence without stopping for a breath. Using accessory muscles, abdominal muscles to breathe. Denies any recent LVAD alarms    INTERVENTIONS    The patient was seen for Respiratory problem. Staff concerns included tachypnea, oxygen saturation < 90% despite supplemental oxygen, increased WOB, and increased oxygen requirements. The following interventions were performed: supplemental oxygen, continuous pulse ox monitoring continued, and continuous cardiac monitoring continued.  Cardiac problem. Staff concerns included tachycardia. The following interventions were performed: EKG.    Dr Guajardo to bedside, labs and EKG reviewed  Decision made to upgrade patient to ICU for higher level of care    RECOMMENDATIONS    We recommend: transfer to ICU, further care per [facility]/SICU    PROVIDER ESCALATION    Orders received and case discussed with Dr. Guajardo .    Primary team arrival time: 0250    Disposition: Tx in ICU bed 79908.  Patient placed on portable LVAD batteries  Transferred to SICU [id number] on  continuous cardiac, SpO2 monitoring. 5L NC via O2 tank  Milrinone, zosyn infusing to MELISSA PICC  Bedside handoff given to SICU RN Sera    FOLLOW UP    charge RNMarissa  updated on plan of care. Instructed to call the Rapid Response NurseRENEE RN at [phone] for additional questions or concerns.

## 2023-07-22 NOTE — HPI
38 year old male with a history of CHF s/p LVAD placement in June of 2022.  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  He was apparently on room air for most of that hospital stay.  He received 3 days of remdesivir.  He was discharged on oral doxycycline to complete a 14 day course for the MSSA drive line infection.  He had tested negative for COVID-19 following his treatment.  He is re-admitted with complaints of shortness of breath, chest pains and generalized ill feeling. COVID-19 testing is positive again.  Chest x-ray shows diffuse infiltrates bilaterally.  ID is consulted to assist with his management.

## 2023-07-22 NOTE — ASSESSMENT & PLAN NOTE
Procedure: Device Interrogation Including analysis of device parameters  Current Settings: Ventricular Assist Device  Review of device function is stable/unstable stable    TXP LVAD INTERROGATIONS 7/22/2023 6/6/2023 6/6/2023 6/6/2023 6/6/2023 6/6/2023 6/6/2023   Type HeartMate3 HeartMate3 HeartMate3 HeartMate3 HeartMate3 HeartMate3 HeartMate3   Flow 4.3 4.1 4.3 4.0 4.3 4.1 4.1   Speed 5100 5100 5100 5100 5100 5100 5100   PI 3.7 5.2 5.0 5.5 4.7 4.2 5.1   Power (Serna) 3.7 3.6 3.7 3.7 3.7 3.6 3.7   LSL 4700 4400 4400 4400 4400 4400 4400   Low Flow Alarm - - - - - - -   High Power Alarm - - - - - - -   Pulsatility - Intermittent pulse Intermittent pulse Intermittent pulse Intermittent pulse Intermittent pulse Intermittent pulse       On coumadin for for anticoagulation.  Check INR  On Milrinone and oral Lasix for RV failure

## 2023-07-22 NOTE — ASSESSMENT & PLAN NOTE
Patient has ground-glass opacities with consolidation findings on imaging from outside hospital.  Will get a chest x-ray here.  Also complains of having pleuritic chest pain which is worse on deep inspirations  Currently spiking fevers.    Plan  Will check for COVID.  Start him on broad-spectrum antibiotics.  Blood cultures urine analysis.

## 2023-07-22 NOTE — SUBJECTIVE & OBJECTIVE
Interval HPI:   Overnight events: Remains in SICU. BG above goal ranges on prn SQ insulin correction scale. Diet Vegan    Eatin%  Nausea: No  Hypoglycemia and intervention: No  Fever: No  TPN and/or TF: No  If yes, type of TF/TPN and rate: n/a    PMH, PSH, FH, SH reviewed     ROS:  Constitutional: Positive for fever  Eyes: Negative for visual disturbance.  Respiratory: Positive for  cough and SOB  Cardiovascular: Negative for chest pain.  Gastrointestinal: Negative for nausea.  Endocrine: Negative for polyuria, polydipsia.  Musculoskeletal: Negative for back pain.  Skin: Negative for rash.  Neurological: Negative for syncope.  Psychiatric/Behavioral: Negative for depression.    Review of Systems    Current Medications and/or Treatments Impacting Glycemic Control  Immunotherapy:    Immunosuppressants       None          Steroids:   Hormones (From admission, onward)      None          Pressors:    Autonomic Drugs (From admission, onward)      None          Hyperglycemia/Diabetes Medications:   Antihyperglycemics (From admission, onward)      Start     Stop Route Frequency Ordered    23 1230  insulin detemir U-100 (Levemir) pen 20 Units         -- SubQ Daily 23 1221    23 1230  insulin aspart U-100 pen 6 Units         -- SubQ 3 times daily with meals 23 1221    23 0715  insulin aspart U-100 pen 1-10 Units         -- SubQ 3 times daily with meals 23 0041             PHYSICAL EXAMINATION:  Vitals:    23 1102   BP: (!) 85/0   Pulse: (!) 127   Resp: (!) 37   Temp: 99.1 °F (37.3 °C)     Body mass index is 27.25 kg/m².     Physical Exam   Deferred to decrease risk/exposure of COVID-19.

## 2023-07-22 NOTE — HPI
Patient is a 38 yo black male with stage d HFrEF, NICM, ? 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 He underwent RVAD removal and chest closure 6/30/2022.  He was weaned off  but he had to restarted due to RVF. He was eventually transitioned to milrinone (secondary to  shortage) now on 0.25 mcg/kg/min.  Patient also has history of driveline infection.  Patient presented to the emergency room today because of pleuritic chest pain that has been going on for the last 3 days however it was more intense today.  Also complains of having shortness of breath and fevers associated with it.  He was spiking fever of 102 in the emergency room with elevated white blood cell count up to 17,000. He was treated with Zosyn.  He was sent here for admission.  Patient was admitted a month back for COVID infection for which he received remdesivir for 3 days.  He is on doxycycline for chronic driveline infection.  He is on Milrinone for RV failure.  Patient denies having any low flow alarms on the pump.  Also denies having any lower extremity edema, increased discharge from his driveline site.  Patient has ground-glass opacities on imaging from outside hospital

## 2023-07-22 NOTE — H&P
[facility] - Cardiology Stepdown  Heart Transplant  H&P    Patient Name: Kevan Queen  MRN: [id number]  Admission Date: 7/22/2023  Attending Physician: Josh Pulido MD  Primary Care Provider: ORALIA Cline  Principal Problem:Severe sepsis    Subjective:     History of Present Illness:  Patient is a 36 yo black male with stage d HFrEF, NICM, ? 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 He underwent RVAD removal and chest closure 6/30/2022.  He was weaned off  but he had to restarted due to RVF. He was eventually transitioned to milrinone (secondary to  shortage) now on 0.25 mcg/kg/min.  Patient also has history of driveline infection.  Patient presented to the emergency room today because of pleuritic chest pain that has been going on for the last 3 days however it was more intense today.  Also complains of having shortness of breath and fevers associated with it.  He was spiking fever of 102 in the emergency room with elevated white blood cell count up to 17,000. He was treated with Zosyn.  He was sent here for admission.  Patient was admitted a month back for COVID infection for which he received remdesivir for 3 days.  He is on doxycycline for chronic driveline infection.  He is on Milrinone for RV failure.  Patient denies having any low flow alarms on the pump.  Also denies having any lower extremity edema, increased discharge from his driveline site.  Patient has ground-glass opacities on imaging from outside hospital      Past Medical History:   Diagnosis Date    Arthritis     Awareness alteration, transient 9/1/2022    Cardiomyopathy     CHF (congestive heart failure) 10/01/2020    COVID-19 6/3/2023    Diabetes mellitus     Dilated cardiomyopathy 10/26/2020    Drug abuse 10/2020    Headache 4/19/2023    Hyperglycemia 12/16/2022     Hyperosmolar hyperglycemic state (HHS) 5/25/2022    ICD (implantable cardioverter-defibrillator) in place 10/26/2020    Left ventricular assist device (LVAD) complication, initial encounter 6/5/2023    Muscle cramping 6/15/2022    Renal disorder     SOB (shortness of breath) 6/13/2022    Tingling in extremities 7/13/2022       Past Surgical History:   Procedure Laterality Date    APPLICATION OF WOUND VACUUM-ASSISTED CLOSURE DEVICE N/A 6/30/2022    Procedure: APPLICATION, WOUND VAC;  Surgeon: Luis F Paige MD;  Location: [facility] OR [address] [place] [address];  Service: Cardiovascular;  Laterality: N/A;  50 x 5 cm    CARDIAC DEFIBRILLATOR PLACEMENT      IMPLANTATION OF RIGHT VENTRICULAR ASSIST DEVICE (RVAD) N/A 6/29/2022    Procedure: INSERTION, RVAD;  Surgeon: Luis F Paige MD;  [facility];  Service: Cardiovascular;  Laterality: N/A;    INSERTION OF GRAFT TO PERICARDIUM Right 6/30/2022    Procedure: INSERTION-RIGHT VENTRICULAR ASSIST DEVICE;  Surgeon: Luis F Paige MD;  [facility];  Service: Cardiovascular;  Laterality: Right;    IRRIGATION OF MEDIASTINUM  6/30/2022    Procedure: IRRIGATION, MEDIASTINUM;  Surgeon: Luis F Paige MD;  [facility];  Service: Cardiovascular;;    LEFT VENTRICULAR ASSIST DEVICE Left 6/23/2022    Procedure: INSERTION-LEFT VENTRICULAR ASSIST DEVICE;  Surgeon: Luis F Paige MD;  [facility];  Service: Cardiovascular;  Laterality: Left;    LEFT VENTRICULAR ASSIST DEVICE N/A 6/29/2022    Procedure: INSERTION-LEFT VENTRICULAR ASSIST DEVICE;  Surgeon: Luis F Paige MD;  [facility];  Service: Cardiovascular;  Laterality: N/A;    RIGHT HEART CATHETERIZATION Right 4/8/2022    Procedure: INSERTION, CATHETER, RIGHT HEART;  Surgeon: Luca Lopez Jr., MD;  Location: [facility] CATH LAB;  Service: Cardiology;  Laterality: Right;    RIGHT HEART CATHETERIZATION Right 4/19/2022    Procedure: INSERTION, CATHETER, RIGHT HEART;  Surgeon: Josh ARMAS  Griffin Pulido MD;  Location: [facility] CATH LAB;  Service: Cardiology;  Laterality: Right;    RIGHT HEART CATHETERIZATION Right 7/21/2022    Procedure: INSERTION, CATHETER, RIGHT HEART;  Surgeon: Dalia Crum MD;  Location: [facility] CATH LAB;  Service: Cardiology;  Laterality: Right;    RIGHT HEART CATHETERIZATION Right 10/31/2022    Procedure: INSERTION, CATHETER, RIGHT HEART;  Surgeon: Dalia Crum MD;  Location: [facility] CATH LAB;  Service: Cardiology;  Laterality: Right;    STERNAL WOUND CLOSURE N/A 6/30/2022    Procedure: CLOSURE, WOUND, STERNUM;  Surgeon: Luis F Paige MD;  [facility];  Service: Cardiovascular;  Laterality: N/A;       Review of patient's allergies indicates:   Allergen Reactions    Aspirin Other (See Comments)     Mr. Thacker is enrolled in Dr. Paige's Alon Trial and cannot have any aspirin and/or aspirin-containing products. DO NOT cancel any orders for INV Aspirin 100 mg/Placebo. If you have questions, please contact Isabel @ [phone], [phone],[email or web address], secure chat or MS Teams message.    Bumex [bumetanide] Hives    Lactose Diarrhea     Other reaction(s): Abdominal distension, gaseous    Torsemide Hives       Current Facility-Administered Medications   Medication    acetaminophen tablet 650 mg    busPIRone tablet 10 mg    dextrose 10% bolus 125 mL 125 mL    dextrose 10% bolus 250 mL 250 mL    furosemide tablet 80 mg    glucagon (human recombinant) injection 1 mg    glucose chewable tablet 16 g    glucose chewable tablet 24 g    insulin aspart U-100 pen 1-10 Units    levETIRAcetam tablet 1,000 mg    milrinone 20mg in D5W 100 mL infusion    mirtazapine tablet 15 mg    piperacillin-tazobactam (ZOSYN) 4.5 g in dextrose 5 % in water (D5W) 5 % 100 mL IVPB (MB+)    spironolactone tablet 25 mg    valsartan tablet 40 mg    vancomycin - pharmacy to dose    [START ON 7/26/2023] warfarin (COUMADIN) tablet 6 mg    [START ON 7/23/2023] warfarin (COUMADIN) tablet 6 mg     warfarin tablet 4.5 mg     Family History       Problem Relation (Age of Onset)    Diverticulosis Brother    Heart attack Maternal Grandmother, Maternal Grandfather    Heart failure Father          Tobacco Use    Smoking status: Former     Packs/day: 0.50     Years: 16.00     Pack years: 8.00     Types: Cigarettes     Start date: 10/1/2004     Quit date: 2021     Years since quittin.2    Smokeless tobacco: Never   Substance and Sexual Activity    Alcohol use: Not Currently    Drug use: Not Currently     Types: Marijuana, MDMA (Ecstacy)    Sexual activity: Yes     Partners: Female     Birth control/protection: None     Review of Systems   Constitutional:  Positive for fever.   HENT: Negative.     Eyes: Negative.    Respiratory:  Positive for cough and shortness of breath.    Cardiovascular:  Positive for chest pain.   Gastrointestinal: Negative.    Endocrine: Negative.    Genitourinary: Negative.    Musculoskeletal: Negative.    Neurological: Negative.    Objective:     Vital Signs (Most Recent):  Temp: (!) 101.4 °F (38.6 °C) (23 0040)  Pulse: (!) 136 (23 0040)  Resp: (!) 24 (23 0040)  BP: (!) 82/0 (23 0040)  SpO2: (!) 92 % (23 0040) Vital Signs (24h Range):  Temp:  [101.4 °F (38.6 °C)] 101.4 °F (38.6 °C)  Pulse:  [136] 136  Resp:  [24] 24  SpO2:  [92 %] 92 %  BP: (82)/(0) 82/0     No data found.  There is no height or weight on file to calculate BMI.    No intake or output data in the 24 hours ending 23 0100       Physical Exam  Constitutional:       Appearance: Normal appearance.   HENT:      Head: Normocephalic and atraumatic.   Eyes:      Extraocular Movements: Extraocular movements intact.      Pupils: Pupils are equal, round, and reactive to light.   Cardiovascular:      Rate and Rhythm: Normal rate and regular rhythm.      Pulses: Normal pulses.      Comments: VAD hum present  Pulmonary:      Effort: Pulmonary effort is normal.      Breath sounds: Rales present.    Abdominal:      General: Abdomen is flat. Bowel sounds are normal.      Palpations: Abdomen is soft.   Musculoskeletal:         General: Normal range of motion.      Cervical back: Normal range of motion.   Skin:     General: Skin is warm.      Capillary Refill: Capillary refill takes less than 2 seconds.   Neurological:      General: No focal deficit present.      Mental Status: He is alert and oriented to person, place, and time.          Significant Labs:  CBC:  No results for input(s): WBC, RBC, HGB, HCT, PLT, MCV, MCH, MCHC in the last 168 hours.  BNP:  No results for input(s): BNP in the last 168 hours.    Invalid input(s): BNPTRIAGELBLO  CMP:  No results for input(s): GLU, CALCIUM, ALBUMIN, PROT, NA, K, CO2, CL, BUN, CREATININE, ALKPHOS, ALT, AST, BILITOT in the last 168 hours.   Coagulation:   No results for input(s): PT, INR, APTT in the last 168 hours.  LDH:  No results for input(s): LDH in the last 72 hours.  Microbiology:  Microbiology Results (last 7 days)       Procedure Component Value Units Date/Time    Blood culture [[id number]]     Order Status: Sent Specimen: Blood     Blood culture [[id number]]     Order Status: Sent Specimen: Blood     Culture, Anaerobe [[id number]]     Order Status: No result Specimen: Abscess     Aerobic culture [[id number]]     Order Status: No result Specimen: Abscess             I have reviewed all pertinent labs within the past 24 hours.    Diagnostic Results:         Assessment/Plan:     * Severe sepsis  Patient presents with elevated fevers, white blood cell count, elevated lactic acid.  Source likely pneumonia.  Will check for COVID  Will start him on broad-spectrum antibiotics with vancomycin and Zosyn.  Check blood cultures, urinalysis.  Will keep him on Tylenol for fevers and chest pain    CAP (community acquired pneumonia)  Patient has ground-glass opacities with consolidation findings on imaging from outside hospital.  Will get a chest x-ray here.  Also complains of  having pleuritic chest pain which is worse on deep inspirations  Currently spiking fevers.    Plan  Will check for COVID.  Start him on broad-spectrum antibiotics.  Blood cultures urine analysis..      Acute hypoxic respiratory failure  Likely from pneumonia.  Currently requiring 3 L to keep his sats above 92.      Seizure-like activity  On on Keppra    LVAD (left ventricular assist device) present  Procedure: Device Interrogation Including analysis of device parameters  Current Settings: Ventricular Assist Device  Review of device function is stable/unstable stable    TXP LVAD INTERROGATIONS 7/22/2023 6/6/2023 6/6/2023 6/6/2023 6/6/2023 6/6/2023 6/6/2023   Type HeartMate3 HeartMate3 HeartMate3 HeartMate3 HeartMate3 HeartMate3 HeartMate3   Flow 4.3 4.1 4.3 4.0 4.3 4.1 4.1   Speed 5100 5100 5100 5100 5100 5100 5100   PI 3.7 5.2 5.0 5.5 4.7 4.2 5.1   Power (Serna) 3.7 3.6 3.7 3.7 3.7 3.6 3.7   LSL 4700 4400 4400 4400 4400 4400 4400   Low Flow Alarm - - - - - - -   High Power Alarm - - - - - - -   Pulsatility - Intermittent pulse Intermittent pulse Intermittent pulse Intermittent pulse Intermittent pulse Intermittent pulse       On coumadin for for anticoagulation.  Check INR  On Milrinone and oral Lasix for RV failure    Type 2 diabetes mellitus with hyperglycemia  Will keep him on sliding scale and a.c. HS    Chronic combined systolic and diastolic heart failure  Status post LVAD.  On GDMT with valsartan and Aldactone        Joaquín Guajardo MD  Heart Transplant  [facility] - [facility]

## 2023-07-22 NOTE — PLAN OF CARE
Recommendations    1. Continue Vegan Diet. Please adhere to patient choices as much as possible.   2. Recommend ApprenNet Standard 1.4 with all meals to optimize nutrition.   3. Continue appetite stimulant.   4. Recommend MVI, B12, thiamine and probiotic supplementation.   5. Recommend continue routine weight measurements to assess for any acute weight changes.    6. RD to monitor and follow-up.    Goals: Meet % EEN/EPN by follow-up date.  Nutrition Goal Status: new  Communication of RD Recs: other (comment) (POC)

## 2023-07-22 NOTE — HPI
Reason for Consult: Management of T2DM, Hyperglycemia     Surgical Procedure and Date: HM3 implantation 6/23/2022    Diabetes diagnosis year:  2022    Home Diabetes Medications:  Levemir 22 units BID, Novolog 16 units TID with meals in addition to SSI (151-200/+1)     How often checking glucose at home? Freestyle William    BG readings on regimen: 170-low 200s  Hypoglycemia on the regimen?  No  Missed doses on regimen?  Yes    Diabetes Complications include:     Hyperglycemia     Complicating diabetes co morbidities:   History of MI, CHF, and CKD         HPI:   Patient is a 38 y.o. 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 He underwent RVAD removal and chest closure 6/30/2022.  He was weaned off  but he had to restarted due to RVF. He was eventually transitioned to milrinone (secondary to  shortage) now on 0.25 mcg/kg/min.  Patient also has history of driveline infection.  Patient presented to the emergency room today because of pleuritic chest pain that has been going on for the last 3 days however it was more intense today.  Also complains of having shortness of breath and fevers associated with it.  He was spiking fever of 102 in the emergency room with elevated white blood cell count up to 17,000. He was treated with Zosyn.  He was sent here for admission.  Patient was admitted a month back for COVID infection for which he received remdesivir for 3 days. Patient has ground-glass opacities on imaging from outside hospital. Endocrinology consulted for management of T2DM.      Lab Results   Component Value Date    HGBA1C 6.7 (H) 07/22/2023

## 2023-07-22 NOTE — SIGNIFICANT EVENT
Patient oxygen requirements are going up with him currently being on 5 L saturating 89-90%.  On telemetry his heart rate is 140.  Chest x-ray shows worsening new diffuse bilateral airspace opacities .    Plan  Will give him IV Lasix 80 mg x 1 now and transferred to ICU

## 2023-07-22 NOTE — PROGRESS NOTES
Pharmacokinetic Initial Assessment: IV Vancomycin    Assessment/Plan:    Initiate intravenous vancomycin with loading dose of 2000 mg once followed by a maintenance dose of vancomycin 1500mg IV every 12 hours  Desired empiric serum trough concentration is 15 to 20 mcg/mL  Draw vancomycin trough level 60 min prior to fourth dose on 7/23 at approximately 1430  Pharmacy will continue to follow and monitor vancomycin.      Please contact pharmacy at extension [phone] with any questions regarding this assessment.     Thank you for the consult,   Molly Christy       Patient brief summary:  Kevan Queen is a 38 y.o. male initiated on antimicrobial therapy with IV Vancomycin for treatment of suspected intra-abdominal infection    Drug Allergies:   Review of patient's allergies indicates:   Allergen Reactions    Aspirin Other (See Comments)     Mr. Thacker is enrolled in Dr. Paige's Alon Trial and cannot have any aspirin and/or aspirin-containing products. DO NOT cancel any orders for INV Aspirin 100 mg/Placebo. If you have questions, please contact Isabel @ [phone], [phone],[email or web address], secure chat or MS Teams message.    Bumex [bumetanide] Hives    Lactose Diarrhea     Other reaction(s): Abdominal distension, gaseous    Torsemide Hives       Actual Body Weight:   98.9kg    Renal Function:   Estimated Creatinine Clearance: 99.8 mL/min (based on SCr of 1.2 mg/dL).,       CBC (last 72 hours):  Recent Labs   Lab Result Units 07/22/23  0140   WBC K/uL 27.45*   Hemoglobin g/dL 11.8*   Hemoglobin A1C % 6.7*   Hematocrit % 35.3*   Platelets K/uL 411       Metabolic Panel (last 72 hours):  Recent Labs   Lab Result Units 07/22/23  0140   Sodium mmol/L 130*  132*   Potassium mmol/L 4.5  4.5   Chloride mmol/L 93*  94*   CO2 mmol/L 24  24   Glucose mg/dL 166*  165*   BUN mg/dL 19  18   Creatinine mg/dL 1.1  1.2   Magnesium mg/dL 1.7   Phosphorus mg/dL 3.4       Drug levels (last 3 results):  No  results for input(s): VANCOMYCINRA, VANCORANDOM, VANCOMYCINPE, VANCOPEAK, VANCOMYCINTR, VANCOTROUGH in the last 72 hours.    Microbiologic Results:  Microbiology Results (last 7 days)       Procedure Component Value Units Date/Time    Culture, Anaerobe [[id number]] Collected: 07/22/23 0159    Order Status: Sent Specimen: Abscess from Abdomen Updated: 07/22/23 0200    Aerobic culture [[id number]] Collected: 07/22/23 0159    Order Status: Sent Specimen: Abscess from Abdomen Updated: 07/22/23 0200    Blood culture [[id number]]     Order Status: Sent Specimen: Blood     Blood culture [[id number]]     Order Status: Sent Specimen: Blood

## 2023-07-22 NOTE — SUBJECTIVE & OBJECTIVE
Past Medical History:   Diagnosis Date    Arthritis     Awareness alteration, transient 9/1/2022    Cardiomyopathy     CHF (congestive heart failure) 10/01/2020    COVID-19 6/3/2023    Diabetes mellitus     Dilated cardiomyopathy 10/26/2020    Drug abuse 10/2020    Headache 4/19/2023    Hyperglycemia 12/16/2022    Hyperosmolar hyperglycemic state (HHS) 5/25/2022    ICD (implantable cardioverter-defibrillator) in place 10/26/2020    Left ventricular assist device (LVAD) complication, initial encounter 6/5/2023    Muscle cramping 6/15/2022    Renal disorder     SOB (shortness of breath) 6/13/2022    Tingling in extremities 7/13/2022       Past Surgical History:   Procedure Laterality Date    APPLICATION OF WOUND VACUUM-ASSISTED CLOSURE DEVICE N/A 6/30/2022    Procedure: APPLICATION, WOUND VAC;  Surgeon: Luis F Paige MD;  Location: [facility] OR [address] [place] [address];  Service: Cardiovascular;  Laterality: N/A;  50 x 5 cm    CARDIAC DEFIBRILLATOR PLACEMENT      IMPLANTATION OF RIGHT VENTRICULAR ASSIST DEVICE (RVAD) N/A 6/29/2022    Procedure: INSERTION, RVAD;  Surgeon: Luis F Paige MD;  [facility];  Service: Cardiovascular;  Laterality: N/A;    INSERTION OF GRAFT TO PERICARDIUM Right 6/30/2022    Procedure: INSERTION-RIGHT VENTRICULAR ASSIST DEVICE;  Surgeon: Luis F Paige MD;  [facility];  Service: Cardiovascular;  Laterality: Right;    IRRIGATION OF MEDIASTINUM  6/30/2022    Procedure: IRRIGATION, MEDIASTINUM;  Surgeon: Luis F Paige MD;  Location: [facility];  Service: Cardiovascular;;    LEFT VENTRICULAR ASSIST DEVICE Left 6/23/2022    Procedure: INSERTION-LEFT VENTRICULAR ASSIST DEVICE;  Surgeon: Luis F Paige MD;  [facility]: [facility];  Service: Cardiovascular;  Laterality: Left;    LEFT VENTRICULAR ASSIST DEVICE N/A 6/29/2022    Procedure: INSERTION-LEFT VENTRICULAR ASSIST DEVICE;  Surgeon: Luis F Paige MD;  [facility]: [facility];  Service: Cardiovascular;  Laterality: N/A;     RIGHT HEART CATHETERIZATION Right 4/8/2022    Procedure: INSERTION, CATHETER, RIGHT HEART;  Surgeon: Luca Lopez Jr., MD;  Location: [facility] CATH LAB;  Service: Cardiology;  Laterality: Right;    RIGHT HEART CATHETERIZATION Right 4/19/2022    Procedure: INSERTION, CATHETER, RIGHT HEART;  Surgeon: Josh Pulido MD;  Location: [facility] CATH LAB;  Service: Cardiology;  Laterality: Right;    RIGHT HEART CATHETERIZATION Right 7/21/2022    Procedure: INSERTION, CATHETER, RIGHT HEART;  Surgeon: Dalia Crum MD;  Location: [facility] CATH LAB;  Service: Cardiology;  Laterality: Right;    RIGHT HEART CATHETERIZATION Right 10/31/2022    Procedure: INSERTION, CATHETER, RIGHT HEART;  Surgeon: Dalia Crum MD;  Location: [facility] CATH LAB;  Service: Cardiology;  Laterality: Right;    STERNAL WOUND CLOSURE N/A 6/30/2022    Procedure: CLOSURE, WOUND, STERNUM;  Surgeon: Luis F Paige MD;  Location: [facility] OR [address] [place] [address];  Service: Cardiovascular;  Laterality: N/A;       Review of patient's allergies indicates:   Allergen Reactions    Aspirin Other (See Comments)     Mr. Thacker is enrolled in Dr. Paige's Alon Trial and cannot have any aspirin and/or aspirin-containing products. DO NOT cancel any orders for INV Aspirin 100 mg/Placebo. If you have questions, please contact Isabel @ [phone], [phone],[email or web address], secure chat or MS Teams message.    Bumex [bumetanide] Hives    Lactose Diarrhea     Other reaction(s): Abdominal distension, gaseous    Torsemide Hives       Current Facility-Administered Medications   Medication    acetaminophen tablet 650 mg    busPIRone tablet 10 mg    dextrose 10% bolus 125 mL 125 mL    dextrose 10% bolus 250 mL 250 mL    furosemide tablet 80 mg    glucagon (human recombinant) injection 1 mg    glucose chewable tablet 16 g    glucose chewable tablet 24 g    insulin aspart U-100 pen 1-10 Units    levETIRAcetam tablet 1,000 mg    milrinone 20mg in D5W 100 mL infusion    mirtazapine tablet  15 mg    piperacillin-tazobactam (ZOSYN) 4.5 g in dextrose 5 % in water (D5W) 5 % 100 mL IVPB (MB+)    spironolactone tablet 25 mg    valsartan tablet 40 mg    vancomycin - pharmacy to dose    [START ON 2023] warfarin (COUMADIN) tablet 6 mg    [START ON 2023] warfarin (COUMADIN) tablet 6 mg    warfarin tablet 4.5 mg     Family History       Problem Relation (Age of Onset)    Diverticulosis Brother    Heart attack Maternal Grandmother, Maternal Grandfather    Heart failure Father          Tobacco Use    Smoking status: Former     Packs/day: 0.50     Years: 16.00     Pack years: 8.00     Types: Cigarettes     Start date: 10/1/2004     Quit date: 2021     Years since quittin.2    Smokeless tobacco: Never   Substance and Sexual Activity    Alcohol use: Not Currently    Drug use: Not Currently     Types: Marijuana, MDMA (Ecstacy)    Sexual activity: Yes     Partners: Female     Birth control/protection: None     Review of Systems   Constitutional:  Positive for fever.   HENT: Negative.     Eyes: Negative.    Respiratory:  Positive for cough and shortness of breath.    Cardiovascular:  Positive for chest pain.   Gastrointestinal: Negative.    Endocrine: Negative.    Genitourinary: Negative.    Musculoskeletal: Negative.    Neurological: Negative.    Objective:     Vital Signs (Most Recent):  Temp: (!) 101.4 °F (38.6 °C) (23 0040)  Pulse: (!) 136 (230)  Resp: (!) 24 (230)  BP: (!) 82/0 (23)  SpO2: (!) 92 % (23) Vital Signs (24h Range):  Temp:  [101.4 °F (38.6 °C)] 101.4 °F (38.6 °C)  Pulse:  [136] 136  Resp:  [24] 24  SpO2:  [92 %] 92 %  BP: (82)/(0) 82/0     No data found.  There is no height or weight on file to calculate BMI.    No intake or output data in the 24 hours ending 23 0100       Physical Exam  Constitutional:       Appearance: Normal appearance.   HENT:      Head: Normocephalic and atraumatic.   Eyes:      Extraocular Movements:  Extraocular movements intact.      Pupils: Pupils are equal, round, and reactive to light.   Cardiovascular:      Rate and Rhythm: Normal rate and regular rhythm.      Pulses: Normal pulses.      Comments: VAD hum present  Pulmonary:      Effort: Pulmonary effort is normal.      Breath sounds: Rales present.   Abdominal:      General: Abdomen is flat. Bowel sounds are normal.      Palpations: Abdomen is soft.   Musculoskeletal:         General: Normal range of motion.      Cervical back: Normal range of motion.   Skin:     General: Skin is warm.      Capillary Refill: Capillary refill takes less than 2 seconds.   Neurological:      General: No focal deficit present.      Mental Status: He is alert and oriented to person, place, and time.          Significant Labs:  CBC:  No results for input(s): WBC, RBC, HGB, HCT, PLT, MCV, MCH, MCHC in the last 168 hours.  BNP:  No results for input(s): BNP in the last 168 hours.    Invalid input(s): BNPTRIAGELBLO  CMP:  No results for input(s): GLU, CALCIUM, ALBUMIN, PROT, NA, K, CO2, CL, BUN, CREATININE, ALKPHOS, ALT, AST, BILITOT in the last 168 hours.   Coagulation:   No results for input(s): PT, INR, APTT in the last 168 hours.  LDH:  No results for input(s): LDH in the last 72 hours.  Microbiology:  Microbiology Results (last 7 days)       Procedure Component Value Units Date/Time    Blood culture [[id number]]     Order Status: Sent Specimen: Blood     Blood culture [[id number]]     Order Status: Sent Specimen: Blood     Culture, Anaerobe [[id number]]     Order Status: No result Specimen: Abscess     Aerobic culture [[id number]]     Order Status: No result Specimen: Abscess             I have reviewed all pertinent labs within the past 24 hours.    Diagnostic Results:

## 2023-07-23 PROBLEM — T82.7XXA INFECTION ASSOCIATED WITH DRIVELINE OF LEFT VENTRICULAR ASSIST DEVICE (LVAD): Status: ACTIVE | Noted: 2023-07-23

## 2023-07-23 PROBLEM — B95.61 STAPHYLOCOCCUS AUREUS BACTEREMIA: Status: ACTIVE | Noted: 2023-07-23

## 2023-07-23 PROBLEM — R78.81 STAPHYLOCOCCUS AUREUS BACTEREMIA: Status: ACTIVE | Noted: 2023-07-23

## 2023-07-23 LAB
ALLENS TEST: ABNORMAL
ANION GAP SERPL CALC-SCNC: 11 MMOL/L (ref 8–16)
ANISOCYTOSIS BLD QL SMEAR: SLIGHT
ANISOCYTOSIS BLD QL SMEAR: SLIGHT
APTT PPP: 29.9 SEC (ref 21–32)
APTT PPP: 30.2 SEC (ref 21–32)
APTT PPP: 31.1 SEC (ref 21–32)
APTT PPP: 31.4 SEC (ref 21–32)
BASOPHILS # BLD AUTO: 0.05 K/UL (ref 0–0.2)
BASOPHILS # BLD AUTO: 0.06 K/UL (ref 0–0.2)
BASOPHILS NFR BLD: 0.2 % (ref 0–1.9)
BASOPHILS NFR BLD: 0.2 % (ref 0–1.9)
BUN SERPL-MCNC: 27 MG/DL (ref 6–20)
BURR CELLS BLD QL SMEAR: ABNORMAL
BURR CELLS BLD QL SMEAR: ABNORMAL
CALCIUM SERPL-MCNC: 9.3 MG/DL (ref 8.7–10.5)
CHLORIDE SERPL-SCNC: 88 MMOL/L (ref 95–110)
CO2 SERPL-SCNC: 29 MMOL/L (ref 23–29)
CREAT SERPL-MCNC: 1.3 MG/DL (ref 0.5–1.4)
DACRYOCYTES BLD QL SMEAR: ABNORMAL
DELSYS: ABNORMAL
DIFFERENTIAL METHOD: ABNORMAL
DIFFERENTIAL METHOD: ABNORMAL
EOSINOPHIL # BLD AUTO: 0 K/UL (ref 0–0.5)
EOSINOPHIL # BLD AUTO: 0 K/UL (ref 0–0.5)
EOSINOPHIL NFR BLD: 0 % (ref 0–8)
EOSINOPHIL NFR BLD: 0 % (ref 0–8)
ERYTHROCYTE [DISTWIDTH] IN BLOOD BY AUTOMATED COUNT: 21.7 % (ref 11.5–14.5)
ERYTHROCYTE [DISTWIDTH] IN BLOOD BY AUTOMATED COUNT: 21.8 % (ref 11.5–14.5)
EST. GFR  (NO RACE VARIABLE): >60 ML/MIN/1.73 M^2
FLOW: 3
GIANT PLATELETS BLD QL SMEAR: PRESENT
GIANT PLATELETS BLD QL SMEAR: PRESENT
GLUCOSE SERPL-MCNC: 285 MG/DL (ref 70–110)
HCT VFR BLD AUTO: 29.6 % (ref 40–54)
HCT VFR BLD AUTO: 31.4 % (ref 40–54)
HGB BLD-MCNC: 10.2 G/DL (ref 14–18)
HGB BLD-MCNC: 10.6 G/DL (ref 14–18)
HYPOCHROMIA BLD QL SMEAR: ABNORMAL
IMM GRANULOCYTES # BLD AUTO: 0.31 K/UL (ref 0–0.04)
IMM GRANULOCYTES # BLD AUTO: 0.68 K/UL (ref 0–0.04)
IMM GRANULOCYTES NFR BLD AUTO: 0.9 % (ref 0–0.5)
IMM GRANULOCYTES NFR BLD AUTO: 1.9 % (ref 0–0.5)
INR PPP: 1.2 (ref 0.8–1.2)
INR PPP: 1.3 (ref 0.8–1.2)
LDH SERPL L TO P-CCNC: 430 U/L (ref 110–260)
LYMPHOCYTES # BLD AUTO: 1.2 K/UL (ref 1–4.8)
LYMPHOCYTES # BLD AUTO: 1.4 K/UL (ref 1–4.8)
LYMPHOCYTES NFR BLD: 3.5 % (ref 18–48)
LYMPHOCYTES NFR BLD: 4 % (ref 18–48)
MAGNESIUM SERPL-MCNC: 2 MG/DL (ref 1.6–2.6)
MCH RBC QN AUTO: 26.5 PG (ref 27–31)
MCH RBC QN AUTO: 26.9 PG (ref 27–31)
MCHC RBC AUTO-ENTMCNC: 33.8 G/DL (ref 32–36)
MCHC RBC AUTO-ENTMCNC: 34.5 G/DL (ref 32–36)
MCV RBC AUTO: 78 FL (ref 82–98)
MCV RBC AUTO: 79 FL (ref 82–98)
MODE: ABNORMAL
MONOCYTES # BLD AUTO: 1.3 K/UL (ref 0.3–1)
MONOCYTES # BLD AUTO: 2.7 K/UL (ref 0.3–1)
MONOCYTES NFR BLD: 4 % (ref 4–15)
MONOCYTES NFR BLD: 7.7 % (ref 4–15)
MRSA ID BY PCR: NEGATIVE
NEUTROPHILS # BLD AUTO: 30 K/UL (ref 1.8–7.7)
NEUTROPHILS # BLD AUTO: 30.3 K/UL (ref 1.8–7.7)
NEUTROPHILS NFR BLD: 86.2 % (ref 38–73)
NEUTROPHILS NFR BLD: 91.4 % (ref 38–73)
NRBC BLD-RTO: 0 /100 WBC
NRBC BLD-RTO: 0 /100 WBC
OVALOCYTES BLD QL SMEAR: ABNORMAL
PHOSPHATE SERPL-MCNC: 2.9 MG/DL (ref 2.7–4.5)
PLATELET # BLD AUTO: 384 K/UL (ref 150–450)
PLATELET # BLD AUTO: 406 K/UL (ref 150–450)
PLATELET BLD QL SMEAR: ABNORMAL
PLATELET BLD QL SMEAR: ABNORMAL
PMV BLD AUTO: 9.5 FL (ref 9.2–12.9)
PMV BLD AUTO: 9.9 FL (ref 9.2–12.9)
PO2 BLDA: 26 MMHG (ref 40–60)
POC SATURATED O2: 48 % (ref 95–100)
POCT GLUCOSE: 157 MG/DL (ref 70–110)
POCT GLUCOSE: 188 MG/DL (ref 70–110)
POCT GLUCOSE: 210 MG/DL (ref 70–110)
POIKILOCYTOSIS BLD QL SMEAR: SLIGHT
POIKILOCYTOSIS BLD QL SMEAR: SLIGHT
POLYCHROMASIA BLD QL SMEAR: ABNORMAL
POTASSIUM SERPL-SCNC: 4 MMOL/L (ref 3.5–5.1)
PROTHROMBIN TIME: 13.1 SEC (ref 9–12.5)
PROTHROMBIN TIME: 13.5 SEC (ref 9–12.5)
RBC # BLD AUTO: 3.79 M/UL (ref 4.6–6.2)
RBC # BLD AUTO: 4 M/UL (ref 4.6–6.2)
SAMPLE: ABNORMAL
SCHISTOCYTES BLD QL SMEAR: ABNORMAL
SCHISTOCYTES BLD QL SMEAR: PRESENT
SITE: ABNORMAL
SODIUM SERPL-SCNC: 128 MMOL/L (ref 136–145)
SPHEROCYTES BLD QL SMEAR: ABNORMAL
SPHEROCYTES BLD QL SMEAR: ABNORMAL
STAPH AUREUS ID BY PCR: POSITIVE
TARGETS BLD QL SMEAR: ABNORMAL
TARGETS BLD QL SMEAR: ABNORMAL
TOXIC GRANULES BLD QL SMEAR: PRESENT
TOXIC GRANULES BLD QL SMEAR: PRESENT
VANCOMYCIN TROUGH SERPL-MCNC: 18.1 UG/ML (ref 10–22)
WBC # BLD AUTO: 33.14 K/UL (ref 3.9–12.7)
WBC # BLD AUTO: 34.88 K/UL (ref 3.9–12.7)
WBC TOXIC VACUOLES BLD QL SMEAR: PRESENT

## 2023-07-23 PROCEDURE — 63600175 PHARM REV CODE 636 W HCPCS: Mod: JZ,TB | Performed by: STUDENT IN AN ORGANIZED HEALTH CARE EDUCATION/TRAINING PROGRAM

## 2023-07-23 PROCEDURE — 96372 THER/PROPH/DIAG INJ SC/IM: CPT

## 2023-07-23 PROCEDURE — 85730 THROMBOPLASTIN TIME PARTIAL: CPT | Performed by: HOSPITALIST

## 2023-07-23 PROCEDURE — 85025 COMPLETE CBC W/AUTO DIFF WBC: CPT | Performed by: HOSPITALIST

## 2023-07-23 PROCEDURE — C1751 CATH, INF, PER/CENT/MIDLINE: HCPCS

## 2023-07-23 PROCEDURE — 80202 ASSAY OF VANCOMYCIN: CPT | Performed by: INTERNAL MEDICINE

## 2023-07-23 PROCEDURE — 25000003 PHARM REV CODE 250: Performed by: STUDENT IN AN ORGANIZED HEALTH CARE EDUCATION/TRAINING PROGRAM

## 2023-07-23 PROCEDURE — 99232 PR SUBSEQUENT HOSPITAL CARE,LEVL II: ICD-10-PCS | Mod: ,,, | Performed by: NURSE PRACTITIONER

## 2023-07-23 PROCEDURE — 85730 THROMBOPLASTIN TIME PARTIAL: CPT | Mod: 91 | Performed by: INTERNAL MEDICINE

## 2023-07-23 PROCEDURE — 80048 BASIC METABOLIC PNL TOTAL CA: CPT | Performed by: HOSPITALIST

## 2023-07-23 PROCEDURE — 27000248 HC VAD-ADDITIONAL DAY

## 2023-07-23 PROCEDURE — 25000003 PHARM REV CODE 250: Performed by: HOSPITALIST

## 2023-07-23 PROCEDURE — 99233 SBSQ HOSP IP/OBS HIGH 50: CPT | Mod: ,,, | Performed by: INTERNAL MEDICINE

## 2023-07-23 PROCEDURE — 94761 N-INVAS EAR/PLS OXIMETRY MLT: CPT

## 2023-07-23 PROCEDURE — 85610 PROTHROMBIN TIME: CPT | Performed by: HOSPITALIST

## 2023-07-23 PROCEDURE — 82803 BLOOD GASES ANY COMBINATION: CPT

## 2023-07-23 PROCEDURE — 82962 GLUCOSE BLOOD TEST: CPT

## 2023-07-23 PROCEDURE — 85730 THROMBOPLASTIN TIME PARTIAL: CPT | Mod: 91 | Performed by: STUDENT IN AN ORGANIZED HEALTH CARE EDUCATION/TRAINING PROGRAM

## 2023-07-23 PROCEDURE — 99291 CRITICAL CARE FIRST HOUR: CPT | Mod: ,,, | Performed by: INTERNAL MEDICINE

## 2023-07-23 PROCEDURE — 87186 SC STD MICRODIL/AGAR DIL: CPT | Performed by: HOSPITALIST

## 2023-07-23 PROCEDURE — 63600175 PHARM REV CODE 636 W HCPCS: Performed by: INTERNAL MEDICINE

## 2023-07-23 PROCEDURE — 99900035 HC TECH TIME PER 15 MIN (STAT)

## 2023-07-23 PROCEDURE — 27000221 HC OXYGEN, UP TO 24 HOURS

## 2023-07-23 PROCEDURE — 20000000 HC ICU ROOM

## 2023-07-23 PROCEDURE — 84100 ASSAY OF PHOSPHORUS: CPT | Performed by: HOSPITALIST

## 2023-07-23 PROCEDURE — 99291 PR CRITICAL CARE, E/M 30-74 MINUTES: ICD-10-PCS | Mod: ,,, | Performed by: INTERNAL MEDICINE

## 2023-07-23 PROCEDURE — 83735 ASSAY OF MAGNESIUM: CPT | Performed by: HOSPITALIST

## 2023-07-23 PROCEDURE — 83615 LACTATE (LD) (LDH) ENZYME: CPT | Performed by: HOSPITALIST

## 2023-07-23 PROCEDURE — 85025 COMPLETE CBC W/AUTO DIFF WBC: CPT | Mod: 91 | Performed by: STUDENT IN AN ORGANIZED HEALTH CARE EDUCATION/TRAINING PROGRAM

## 2023-07-23 PROCEDURE — 87077 CULTURE AEROBIC IDENTIFY: CPT | Performed by: HOSPITALIST

## 2023-07-23 PROCEDURE — 63600175 PHARM REV CODE 636 W HCPCS: Performed by: HOSPITALIST

## 2023-07-23 PROCEDURE — 36556 INSERT NON-TUNNEL CV CATH: CPT

## 2023-07-23 PROCEDURE — 99233 PR SUBSEQUENT HOSPITAL CARE,LEVL III: ICD-10-PCS | Mod: ,,, | Performed by: INTERNAL MEDICINE

## 2023-07-23 PROCEDURE — 85610 PROTHROMBIN TIME: CPT | Mod: 91 | Performed by: STUDENT IN AN ORGANIZED HEALTH CARE EDUCATION/TRAINING PROGRAM

## 2023-07-23 PROCEDURE — 87040 BLOOD CULTURE FOR BACTERIA: CPT | Performed by: HOSPITALIST

## 2023-07-23 PROCEDURE — 99232 SBSQ HOSP IP/OBS MODERATE 35: CPT | Mod: ,,, | Performed by: NURSE PRACTITIONER

## 2023-07-23 RX ORDER — INSULIN ASPART 100 [IU]/ML
10 INJECTION, SOLUTION INTRAVENOUS; SUBCUTANEOUS
Status: DISCONTINUED | OUTPATIENT
Start: 2023-07-23 | End: 2023-07-25

## 2023-07-23 RX ORDER — HEPARIN SODIUM,PORCINE/D5W 25000/250
0-40 INTRAVENOUS SOLUTION INTRAVENOUS CONTINUOUS
Status: DISCONTINUED | OUTPATIENT
Start: 2023-07-23 | End: 2023-07-28

## 2023-07-23 RX ADMIN — INSULIN ASPART 2 UNITS: 100 INJECTION, SOLUTION INTRAVENOUS; SUBCUTANEOUS at 04:07

## 2023-07-23 RX ADMIN — INSULIN ASPART 4 UNITS: 100 INJECTION, SOLUTION INTRAVENOUS; SUBCUTANEOUS at 11:07

## 2023-07-23 RX ADMIN — LEVETIRACETAM 1000 MG: 500 TABLET, FILM COATED ORAL at 08:07

## 2023-07-23 RX ADMIN — INSULIN ASPART 2 UNITS: 100 INJECTION, SOLUTION INTRAVENOUS; SUBCUTANEOUS at 08:07

## 2023-07-23 RX ADMIN — HEPARIN SODIUM 15 UNITS/KG/HR: 10000 INJECTION, SOLUTION INTRAVENOUS at 03:07

## 2023-07-23 RX ADMIN — INSULIN ASPART 6 UNITS: 100 INJECTION, SOLUTION INTRAVENOUS; SUBCUTANEOUS at 08:07

## 2023-07-23 RX ADMIN — FUROSEMIDE 80 MG: 10 INJECTION, SOLUTION INTRAVENOUS at 08:07

## 2023-07-23 RX ADMIN — MILRINONE LACTATE 0.25 MCG/KG/MIN: 0.2 INJECTION, SOLUTION INTRAVENOUS at 04:07

## 2023-07-23 RX ADMIN — HEPARIN SODIUM 12 UNITS/KG/HR: 10000 INJECTION, SOLUTION INTRAVENOUS at 09:07

## 2023-07-23 RX ADMIN — PIPERACILLIN SODIUM AND TAZOBACTAM SODIUM 4.5 G: 4; .5 INJECTION, POWDER, FOR SOLUTION INTRAVENOUS at 12:07

## 2023-07-23 RX ADMIN — INSULIN ASPART 10 UNITS: 100 INJECTION, SOLUTION INTRAVENOUS; SUBCUTANEOUS at 11:07

## 2023-07-23 RX ADMIN — HYDROCODONE BITARTRATE AND ACETAMINOPHEN 1 TABLET: 5; 325 TABLET ORAL at 09:07

## 2023-07-23 RX ADMIN — DEXAMETHASONE 6 MG: 4 TABLET ORAL at 08:07

## 2023-07-23 RX ADMIN — BUSPIRONE HYDROCHLORIDE 10 MG: 10 TABLET ORAL at 08:07

## 2023-07-23 RX ADMIN — REMDESIVIR 100 MG: 100 INJECTION, POWDER, LYOPHILIZED, FOR SOLUTION INTRAVENOUS at 08:07

## 2023-07-23 RX ADMIN — PIPERACILLIN SODIUM AND TAZOBACTAM SODIUM 4.5 G: 4; .5 INJECTION, POWDER, FOR SOLUTION INTRAVENOUS at 09:07

## 2023-07-23 RX ADMIN — INSULIN ASPART 10 UNITS: 100 INJECTION, SOLUTION INTRAVENOUS; SUBCUTANEOUS at 04:07

## 2023-07-23 RX ADMIN — VANCOMYCIN HYDROCHLORIDE 1500 MG: 1.5 INJECTION, POWDER, LYOPHILIZED, FOR SOLUTION INTRAVENOUS at 03:07

## 2023-07-23 RX ADMIN — INSULIN ASPART 1 UNITS: 100 INJECTION, SOLUTION INTRAVENOUS; SUBCUTANEOUS at 08:07

## 2023-07-23 RX ADMIN — INSULIN DETEMIR 20 UNITS: 100 INJECTION, SOLUTION SUBCUTANEOUS at 08:07

## 2023-07-23 RX ADMIN — PIPERACILLIN SODIUM AND TAZOBACTAM SODIUM 4.5 G: 4; .5 INJECTION, POWDER, FOR SOLUTION INTRAVENOUS at 04:07

## 2023-07-23 RX ADMIN — VANCOMYCIN HYDROCHLORIDE 1500 MG: 1.5 INJECTION, POWDER, LYOPHILIZED, FOR SOLUTION INTRAVENOUS at 02:07

## 2023-07-23 RX ADMIN — MIRTAZAPINE 15 MG: 15 TABLET, FILM COATED ORAL at 08:07

## 2023-07-23 NOTE — EICU
Intervention Initiated From:  COR / EICU    Thomas intervened regarding:  Documentation and Time out      Nurse Notified:  Yes    Doctor Notified:  Yes    Comments: Privileges verified on Dr. Gilles Schwab  1246 right side of neck cleansed with Chlora prep  1250 Sterile drape placed over patient  1252 Using live US injeted area Lidocaine 1% no epiphrine 2 ml injected  1255 USing live US to locate RIJ, Inserted macro puncture ;with negative pressure on syringe. Blood in syringe. Syringe removed. Guide wire inserted, macro puncture catheter removed.  1257 Holding guide wire secure, dilator inserted and removed.   1258 Small incision made at entry site. Holding guide wire advanced in then removed  1300 Inserted TLC central line 7F/15 cm Guide wire removed through distal port.  1302 Wasted blood then blood collected for blood cultures. All ports aspirated then flushed with sterile saline.   1303 Injected Lidocaine 1% no epi for suturing.   1304 Biopatch at entry site  1306 Sutured TLC done. Central line dressing applied over site. Patient tolerated well. Stat portable CXR ordered.

## 2023-07-23 NOTE — PROGRESS NOTES
Pharmacokinetic Assessment Follow Up: IV Vancomycin    Vancomycin serum concentration assessment(s):    The trough level was drawn correctly and can be used to guide therapy at this time. The measurement is within the desired definitive target range of 15 to 20 mcg/mL.    Vancomycin Regimen Plan:    Plan to CCR of vancomycin 1.5 g IV q12h  Plan to follow up with another VT 7/24 at 1430 for close monitoring    Drug levels (last 3 results):  Recent Labs   Lab Result Units 07/23/23  1413   Vancomycin-Trough ug/mL 18.1     Pharmacy will continue to follow and monitor vancomycin.    Please contact pharmacy at extension [phone] for questions regarding this assessment.    Thank you for the consult,   Adolfo Quinn, PharmD, [facility]      Patient brief summary:  Kevan Queen is a 38 y.o. male initiated on antimicrobial therapy with IV Vancomycin for treatment of bacteremia    Drug Allergies:   Review of patient's allergies indicates:   Allergen Reactions    Aspirin Other (See Comments)     Mr. Thacker is enrolled in Dr. Paige's Alon Trial and cannot have any aspirin and/or aspirin-containing products. DO NOT cancel any orders for INV Aspirin 100 mg/Placebo. If you have questions, please contact Isabel @ [phone], [phone],[email or web address], secure chat or MS Teams message.    Bumex [bumetanide] Hives    Lactose Diarrhea     Other reaction(s): Abdominal distension, gaseous    Torsemide Hives       Actual Body Weight:   98.7 kg    Renal Function:   Estimated Creatinine Clearance: 92.1 mL/min (based on SCr of 1.3 mg/dL).,     Dialysis Method (if applicable):  N/A    CBC (last 72 hours):  Recent Labs   Lab Result Units 07/22/23  0140 07/23/23  0323 07/23/23  0829   WBC K/uL 27.45* 33.14* 34.88*   Hemoglobin g/dL 11.8* 10.2* 10.6*   Hemoglobin A1C % 6.7*  --   --    Hematocrit % 35.3* 29.6* 31.4*   Platelets K/uL 411 384 406   Gran % % 82.0* 91.4* 86.2*   Lymph % % 8.0* 3.5* 4.0*   Mono % % 3.0* 4.0 7.7   Eosinophil % %  0.0 0.0 0.0   Basophil % % 0.0 0.2 0.2   Differential Method  Manual Automated Automated       Metabolic Panel (last 72 hours):  Recent Labs   Lab Result Units 07/22/23  0140 07/22/23  0433 07/22/23  0452 07/23/23  0323   Sodium mmol/L 130*  132*  --  129* 128*   Potassium mmol/L 4.5  4.5  --  4.0 4.0   Chloride mmol/L 93*  94*  --  93* 88*   CO2 mmol/L 24  24  --  21* 29   Glucose mg/dL 166*  165*  --  252* 285*   BUN mg/dL 19  18  --  21* 27*   Creatinine mg/dL 1.1  1.2  --  1.4 1.3   Creatinine, Urine mg/dL  --  200.0  --   --    Albumin g/dL  --   --  2.2*  --    Total Bilirubin mg/dL  --   --  1.4*  --    Alkaline Phosphatase U/L  --   --  198*  --    AST U/L  --   --  59*  --    ALT U/L  --   --  45*  --    Magnesium mg/dL 1.7  --   --  2.0   Phosphorus mg/dL 3.4  --   --  2.9       Vancomycin Administrations:  vancomycin given in the last 96 hours                     vancomycin 1,500 mg in dextrose 5 % (D5W) 250 mL IVPB (Vial-Mate) (mg) 1,500 mg New Bag 07/23/23 1445     1,500 mg New Bag  0328     1,500 mg New Bag 07/22/23 1530    vancomycin 2 g in dextrose 5 % 500 mL IVPB (mg) 2,000 mg New Bag 07/22/23 0329                    Microbiologic Results:  Microbiology Results (last 7 days)       Procedure Component Value Units Date/Time    Blood culture [[id number]] Collected: 07/23/23 1305    Order Status: Sent Specimen: Blood from Line, Jugular, Internal Right Updated: 07/23/23 1437    Aerobic culture [[id number]]  (Abnormal) Collected: 07/22/23 0159    Order Status: Completed Specimen: Abscess from Abdomen Updated: 07/23/23 0850     Aerobic Bacterial Culture STAPHYLOCOCCUS AUREUS  Few  Susceptibility pending      Blood culture [[id number]] Collected: 07/22/23 1058    Order Status: Completed Specimen: Blood from Peripheral, Hand, Right Updated: 07/23/23 0642     Blood Culture, Routine Gram stain aer bottle: Gram positive cocci in clusters resembling Staph      Results called to and read back by:Zaheer Rae  RN 07/23/2023  06:41    MRSA/SA Rapid ID by PCR from Blood culture [[id number]]  (Abnormal) Collected: 07/22/23 1058    Order Status: Completed Updated: 07/23/23 0557     Staph aureus ID by PCR Positive     Methicillin Resistant ID by PCR Negative    Culture, Anaerobe [[id number]] Collected: 07/22/23 0159    Order Status: Completed Specimen: Abscess from Abdomen Updated: 07/23/23 0551     Anaerobic Culture Culture in progress    Blood culture [[id number]]     Order Status: Sent Specimen: Blood

## 2023-07-23 NOTE — SUBJECTIVE & OBJECTIVE
Interval HPI:   Overnight events: Remains in SICU. BG trending up above goal ranges on current SQ insulin regimen. Dex 6 mg daily initiated. Diet Vegan    Eatin%  Nausea: No  Hypoglycemia and intervention: No  Fever: No  TPN and/or TF: No  If yes, type of TF/TPN and rate: n/a    BP (!) 78/0 (BP Location: Left arm, Patient Position: Lying)   Pulse (!) 124   Temp 98.5 °F (36.9 °C) (Oral)   Resp (!) 31   Wt 98.9 kg (218 lb 0.6 oz)   SpO2 95%   BMI 27.25 kg/m²     Labs Reviewed and Include    Recent Labs   Lab 23  0323   *   CALCIUM 9.3   *   K 4.0   CO2 29   CL 88*   BUN 27*   CREATININE 1.3     Lab Results   Component Value Date    WBC 34.88 (H) 2023    HGB 10.6 (L) 2023    HCT 31.4 (L) 2023    MCV 79 (L) 2023     2023     No results for input(s): TSH, FREET4 in the last 168 hours.  Lab Results   Component Value Date    HGBA1C 6.7 (H) 2023       Nutritional status:   Body mass index is 27.25 kg/m².  Lab Results   Component Value Date    ALBUMIN 2.2 (L) 2023    ALBUMIN 3.2 (L) 2023    ALBUMIN 3.1 (L) 2023     Lab Results   Component Value Date    PREALBUMIN 17 (L) 2023    PREALBUMIN 26 2023    PREALBUMIN 22 2023       Estimated Creatinine Clearance: 92.1 mL/min (based on SCr of 1.3 mg/dL).    Accu-Checks  No results for input(s): POCTGLUCOSE in the last 72 hours.    Current Medications and/or Treatments Impacting Glycemic Control  Immunotherapy:    Immunosuppressants       None          Steroids:   Hormones (From admission, onward)      Start     Stop Route Frequency Ordered    23 1400  dexAMETHasone tablet 6 mg          0859 Oral Daily 23 1253          Pressors:    Autonomic Drugs (From admission, onward)      None          Hyperglycemia/Diabetes Medications:   Antihyperglycemics (From admission, onward)      Start     Stop Route Frequency Ordered    23 1130  insulin aspart U-100 pen  10 Units         -- SubQ 3 times daily with meals 07/23/23 0908    07/22/23 1326  insulin aspart U-100 pen 1-10 Units         -- SubQ Before meals & nightly PRN 07/22/23 1226    07/22/23 1230  insulin detemir U-100 (Levemir) pen 20 Units         -- SubQ Daily 07/22/23 1221

## 2023-07-23 NOTE — PLAN OF CARE
SICU PLAN OF CARE NOTE    Dx: Severe sepsis    Shift Events: blood cultures  positive for gram + cocci cultures. MD notify    Goals of Care: Doppler <90    Neuro: AAO x4, Follows Commands, and Moves All Extremities    Vital Signs: BP (!) 84/0 (BP Location: Left arm, Patient Position: Lying)   Pulse (!) 118   Temp 98.5 °F (36.9 °C) (Oral)   Resp (!) 33   Wt 98.9 kg (218 lb 0.6 oz)   SpO2 95%   BMI 27.25 kg/m²     Cardiac: ST    Respiratory: Nasal Cannula 4L    Diet: Cardiac Diet    Gtts: Milrinone    Urine Output: Voids Spontaneously 2275 cc/shift     Labs/Accuchecks: Daily labs/accuchecks ACHS.    Skin: Bed plugged in with pads and foams in place. Pt able to weight shift and turn independently.Driveline exit site noted to LRQ. No new skin breakdown noted upon assessment.

## 2023-07-23 NOTE — PROGRESS NOTES
Admit Note     Heart Transplant  spoke with significant other, Niharika, by phone to assess needs due to pt's current isolation requirements.  Pt's caregiver is familiar with the role of SW from past hospital admissions.   Pt 's caregiver Page agreed to complete assessment by phone.      Patient is a 38 y.o. single male, admitted for positive for COVID and sepsis.  Pt received his LVAD on 6/23/22. The pt's significant other does the dressing changes.        Patient admitted to [facility] on 7/22/2023 .  At this time, patient's caregiver Page presents by phone as alert and oriented x 4, calm, and communicative.  At this time, LCSW was not able to connect with Pt due to Pt's cell phone being off.     Household/Family Systems     Patient resides with significant other, and three children ages 3,5, and 17 (recent graduate) at       [address] [place] [address].    2 hours from [facility]    Support system includes significant other, mother and children. The pt has seven children, two with the pt's significant other. The pt's other children live with their mothers. Pt reports family/mother care for children in the home when the pt and significant other are not in attendance.      Patients primary caregiver is self with support from significant other when indicated.    Pt's primary cell phone is : [phone] (off)  Pt's back up cell phone is: [phone] (off)     Emergency contacts:   Niharika Kyle (significant other, will start work soon and drives) [phone]    During admission, patient's caregiver plans to stay at home.      Confirmed patient and patients caregivers do have access to reliable transportation. Pt reports bother he and his significant other drive.     Cognitive Status/Learning     Patient reports reading ability as college and states patient does not have difficulty with reading, writing, hearing, comprehension, learning, and memory.  Pt reports he continues to have the same difficulty with  his eyesight.  Pt reports his eyesight issues do not effect his ability to drive.       Patient reported in the past that he learns best by multiple methods.       Needed: No.   Highest education level: Attended College/Technical School    Vocation/Disability   .  Working for Income: No  If no, reason not working: medical   Patient used to work as a CNA.  Significant other is currently is working at [employer].   Pt's mother receives monthly income and is able to assist pt financially.   Pt reports he has applied for disability and and a  is assisting with this process. Pt reports The Disability Office reports it may take an additional six months to process pt's information.   Pt reports no difficulty at this time covering the cost of monthly bills.     Adherence     Patient reports a high level of adherence to patients health care regimen. Pt reports he is a vegan.   Adherence counseling and education provided. Patient verbalizes understanding.    Substance Use    Patient caregiver confirmed the following substance usage.    Tobacco: none, patient denies any use.  Alcohol: none, patient denies any use.  Illicit Drugs/Non-prescribed Medications: no current use.   Pt does have a history of drug use. Pt stopped using Marijuana on 11/2021, cocaine on 7/2021 and ecstasy on 9/2020.    Patient states clear understanding of the potential impact of substance use.  Substance abstinence/cessation counseling, education and resources provided and reviewed.     Services Utilizing/ADLS    Infusion Service: Prior to admission, patient utilizing? yes [facility] for home Milrinone.  Pt's significant other Page does the IV dressing changes.   Pt would like to use the same IV company when discharged. [phone], fax [phone]    Home Health: Prior to admission, patient utilizing? no    DME: Prior to admission, no    Pulmonary/Cardiac Rehab: Prior to admission, no    Dialysis:  Prior to admission,  no    Transplant Specialty Pharmacy:  Prior to admission, no.    Prior to admission, patient's caregiver reports patient was independent with ADLS and was driving.  Patient reports patient is not able to care for self at this time due to compromised medical condition (as documented in medical record) and physical weakness..  Patient indicates a willingness to care for self once medically cleared to do so.    Insurance/Medications    Insured by   Payer/Plan Subscr  Sex Relation Sub. Ins. ID Effective Group Num   1. MEDICAID - HE* JOSE J DAMICO 1985 Male Self [id number]* 3/1/20 [id number]                                   [address] [address]      Primary Insurance (for UNOS reporting): Public Insurance - Medicaid  Secondary Insurance (for UNOS reporting): None    Patient caregiver reports patient is able to obtain and afford medications at this time and at time of discharge. Pt reports his monthly medications are running about $8.00    Living Will/Healthcare Power of     Patient's caregiver confirmed patient has a LW and/or HCPA.  Pt reports his mother is his HCPA.  Worker explained need for phone number.    provided education regarding LW and HCPA and the completion of forms.    Coping/Mental Health    Per Pt's Caregiver Page, Patient is coping adequately with the aid of  family members.     Patient indicated mental ailyn difficulties in the past.   Pt reports a history of depression, anxiety and anger and is taking Buspar. Pt reports he continues to be followed by Palliative Care in [place].  Pt is not participating in out pt counseling.  Pt reports no needs or concerns at this time and hopes to be able to go home soon.        Discharge Planning    At time of discharge, patient plans to return to patient's home under the care of self, significant other and mother.  Patients significant other will transport patient.  Per rounds today, expected discharge date has not been medically determined  at this time. Patient and patients caregiver  verbalize understanding and are involved in treatment planning and discharge process.    Additional Concerns    Patient's caretaker denies additional needs and/or concerns at this time. Patient is being followed for needs, education, resources, information, emotional support, supportive counseling, and for supportive and skilled discharge plan of care.  providing ongoing psychosocial support, education, resources and d/c planning as needed.  SW remains available.  provided resource list, patient choice, psychosocial and supportive counseling, resources, education, assistance and discharge planning with patient and caregiver involvement, ongoing SW availability and services as appropriate.  remains available. Patient's caregiver verbalizes understanding and agreement with information reviewed,  availability and how to access available resources as needed.

## 2023-07-23 NOTE — NURSING
Notified MD Liz that patient's PICC line was placed on 3/8/2023. No new orders at this time. Will continue to monitor.

## 2023-07-23 NOTE — PROGRESS NOTES
[facility] - Surgical Intensive Care  Endocrinology  Progress Note    Admit Date: 7/22/2023     Reason for Consult: Management of T2DM, Hyperglycemia     Surgical Procedure and Date: HM3 implantation 6/23/2022    Diabetes diagnosis year:  2022    Home Diabetes Medications:  Levemir 22 units BID, Novolog 16 units TID with meals in addition to SSI (151-200/+1)     How often checking glucose at home? Freestyle William    BG readings on regimen: 170-low 200s  Hypoglycemia on the regimen?  No  Missed doses on regimen?  Yes    Diabetes Complications include:     Hyperglycemia     Complicating diabetes co morbidities:   History of MI, CHF, and CKD         HPI:   Patient is a 38 y.o. 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 He underwent RVAD removal and chest closure 6/30/2022.  He was weaned off  but he had to restarted due to RVF. He was eventually transitioned to milrinone (secondary to  shortage) now on 0.25 mcg/kg/min.  Patient also has history of driveline infection.  Patient presented to the emergency room today because of pleuritic chest pain that has been going on for the last 3 days however it was more intense today.  Also complains of having shortness of breath and fevers associated with it.  He was spiking fever of 102 in the emergency room with elevated white blood cell count up to 17,000. He was treated with Zosyn.  He was sent here for admission.  Patient was admitted a month back for COVID infection for which he received remdesivir for 3 days. Patient has ground-glass opacities on imaging from outside hospital. Endocrinology consulted for management of T2DM.      Lab Results   Component Value Date    HGBA1C 6.7 (H) 07/22/2023           Interval HPI:   Overnight events: Remains in SICU. BG  trending up above goal ranges on current SQ insulin regimen. Dex 6 mg daily initiated. Diet Vegan    Eatin%  Nausea: No  Hypoglycemia and intervention: No  Fever: No  TPN and/or TF: No  If yes, type of TF/TPN and rate: n/a    BP (!) 78/0 (BP Location: Left arm, Patient Position: Lying)   Pulse (!) 124   Temp 98.5 °F (36.9 °C) (Oral)   Resp (!) 31   Wt 98.9 kg (218 lb 0.6 oz)   SpO2 95%   BMI 27.25 kg/m²     Labs Reviewed and Include    Recent Labs   Lab 23  0323   *   CALCIUM 9.3   *   K 4.0   CO2 29   CL 88*   BUN 27*   CREATININE 1.3     Lab Results   Component Value Date    WBC 34.88 (H) 2023    HGB 10.6 (L) 2023    HCT 31.4 (L) 2023    MCV 79 (L) 2023     2023     No results for input(s): TSH, FREET4 in the last 168 hours.  Lab Results   Component Value Date    HGBA1C 6.7 (H) 2023       Nutritional status:   Body mass index is 27.25 kg/m².  Lab Results   Component Value Date    ALBUMIN 2.2 (L) 2023    ALBUMIN 3.2 (L) 2023    ALBUMIN 3.1 (L) 2023     Lab Results   Component Value Date    PREALBUMIN 17 (L) 2023    PREALBUMIN 26 2023    PREALBUMIN 22 2023       Estimated Creatinine Clearance: 92.1 mL/min (based on SCr of 1.3 mg/dL).    Accu-Checks  No results for input(s): POCTGLUCOSE in the last 72 hours.    Current Medications and/or Treatments Impacting Glycemic Control  Immunotherapy:    Immunosuppressants       None          Steroids:   Hormones (From admission, onward)      Start     Stop Route Frequency Ordered    23 1400  dexAMETHasone tablet 6 mg          0859 Oral Daily 23 1253          Pressors:    Autonomic Drugs (From admission, onward)      None          Hyperglycemia/Diabetes Medications:   Antihyperglycemics (From admission, onward)      Start     Stop Route Frequency Ordered    23 1130  insulin aspart U-100 pen 10 Units         -- SubQ 3 times daily with meals  07/23/23 0908    07/22/23 1326  insulin aspart U-100 pen 1-10 Units         -- SubQ Before meals & nightly PRN 07/22/23 1226    07/22/23 1230  insulin detemir U-100 (Levemir) pen 20 Units         -- SubQ Daily 07/22/23 1221            ASSESSMENT and PLAN    Cardiac/Vascular  LVAD (left ventricular assist device) present  Managed per primary team  Avoid hypoglycemia        ID  * Severe sepsis  Managed per primary team        Endocrine  Type 2 diabetes mellitus with hyperglycemia  BG goal 140-180    Increase Levemir to 15 units  BID  Increase Novolog to 10 units TID with meals (basal/prandial match) Prandial excursions noted   Continue Moderate Dose Correction Scale  BG monitoring ac/hs    ** Please call Endocrine for any BG related issues **    Discharge plans: TBD    Lab Results   Component Value Date    HGBA1C 6.7 (H) 07/22/2023                 Jody Starkey NP  Endocrinology  [facility] - [facility] Intensive Care

## 2023-07-23 NOTE — PROGRESS NOTES
07/23/2023  John Alaniz    Current provider:  Josh Pulido MD    Device interrogation:  TXP LVAD INTERROGATIONS 7/23/2023 7/23/2023 7/23/2023 7/23/2023 7/23/2023 7/23/2023 7/23/2023   Type HeartMate3 HeartMate3 HeartMate3 HeartMate3 HeartMate3 HeartMate3 HeartMate3   Flow 4.5 4.5 4.3 4.4 4.5 4.3 4.4   Speed 5100 5100 5100 5100 5100 5100 5100   PI 4.5 3.9 4.1 4.2 3.9 3.9 3.6   Power (Serna) 3.7 3.7 3.7 3.7 3.7 3.7 3.8   LSL 4700 4700 4700 4700 4700 4700 4700   Low Flow Alarm - - - - - - -   High Power Alarm - - - - - - -   Pulsatility Intermittent pulse Intermittent pulse Intermittent pulse Intermittent pulse Intermittent pulse Intermittent pulse Intermittent pulse          Rounded on Kevan Queen to ensure all mechanical assist device settings (IABP or VAD) were appropriate and all parameters were within limits.  I was able to ensure all back up equipment was present, the staff had no issues, and the Perfusion Department daily rounding was complete.      For implantable VADs: Interrogation of Ventricular assist device was performed with analysis of device parameters and review of device function. I have personally reviewed the interrogation findings and agree with findings as stated.     In emergency, the nursing units have been notified to contact the perfusion department either by:  Calling [phone] from 630am to 4pm Mon thru Fri, utilizing the On-Call Finder functionality of Epic and searching for Perfusion, or by contacting the hospital  from 4pm to 630am and on weekends and asking to speak with the perfusionist on call.    7:02 AM

## 2023-07-23 NOTE — ASSESSMENT & PLAN NOTE
BG goal 140-180    Increase Levemir to 15 units  BID  Increase Novolog to 10 units TID with meals (basal/prandial match) Prandial excursions noted   Continue Moderate Dose Correction Scale  BG monitoring ac/hs    ** Please call Endocrine for any BG related issues **    Discharge plans: TBD    Lab Results   Component Value Date    HGBA1C 6.7 (H) 07/22/2023

## 2023-07-23 NOTE — PROGRESS NOTES
[facility] - Surgical Intensive [facility]  Transplant Heart  Progress Note    Patient Name: Kevan Queen  MRN: [id number]  Admission Date: 7/22/2023  Hospital Length of Stay: 1 days  Code Status: Full Code   Attending Physician: Josh Pulido MD   Expected Discharge Date:       Subjective / Interval   Afebrile  Bcx growing gram+ cocci, likely MRSA    Objective     Vitals:    07/23/23 1245 07/23/23 1300 07/23/23 1307 07/23/23 1315   BP:    (!) 86/0   BP Location:    Left arm   Patient Position:    Lying   Pulse: (!) 112 (!) 112 109 (!) 115   Resp: (!) 22 (!) 27 (!) 23 (!) 27   Temp:       TempSrc:       SpO2:    (!) 93%   Weight:       Height:           I/O last 3 completed shifts:  In: 1659.6 [I.V.:236.9; IV Piggyback:1422.7]  Out: 5075 [Urine:5075]  I/O this shift:  In: 639.8 [P.O.:250; I.V.:86.6; IV Piggyback:303.2]  Out: 1475 [Urine:1475]         Physical Examination: General appearance - alert, well appearing, and in no distress  Mental status - alert, oriented to person, place, and time  Neck - JVD present  Chest - clear to auscultation, no wheezes, rales or rhonchi, symmetric air entry  Heart - normal rate, regular rhythm, normal S1, S2, no murmurs, rubs, clicks or gallops,   Abdomen - soft, nontender, nondistended, no masses or organomegaly  Neurological - alert, oriented, normal speech, no focal findings or movement disorder noted  Extremities - BLE edema Trace  PICC in right arm    Labs  BMP  Lab Results   Component Value Date     (L) 07/23/2023    K 4.0 07/23/2023    CL 88 (L) 07/23/2023    CO2 29 07/23/2023    BUN 27 (H) 07/23/2023    CREATININE 1.3 07/23/2023    CALCIUM 9.3 07/23/2023    ANIONGAP 11 07/23/2023    ESTGFRAFRICA >60.0 07/27/2022    EGFRNONAA 54.2 (A) 07/27/2022       Lab Results   Component Value Date    WBC 34.88 (H) 07/23/2023    HGB 10.6 (L) 07/23/2023    HCT 31.4 (L) 07/23/2023    MCV 79 (L) 07/23/2023     07/23/2023         BNP  Recent Labs   Lab 07/22/23  0248   *          Lab Results   Component Value Date    ALT 45 (H) 07/22/2023    AST 59 (H) 07/22/2023    ALKPHOS 198 (H) 07/22/2023    BILITOT 1.4 (H) 07/22/2023       Lab Results   Component Value Date    CHOL 171 02/02/2023    CHOL 261 (H) 04/18/2022    CHOL 121 10/16/2020     Lab Results   Component Value Date    HDL 35 (L) 02/02/2023    HDL 54 04/18/2022    HDL 35 10/16/2020     Lab Results   Component Value Date    LDLCALC 67.6 02/02/2023    LDLCALC Invalid, Trig>400.0 04/18/2022     Lab Results   Component Value Date    TRIG 342 (H) 02/02/2023    TRIG 496 (H) 04/18/2022    TRIG 82 10/16/2020     Lab Results   Component Value Date    CHOLHDL 20.5 02/02/2023    CHOLHDL 20.7 04/18/2022       Cardiac studies  Cardiac studies  Results for orders placed during the hospital encounter of 04/19/23    Echo    Interpretation Summary  · There is an LVAD present. Base speed is 5100 RPMs. The pump type is a Heartmate III. The interventricular septum appears midline. The aortic valve does not open.  · The left ventricle is severely enlarged with severely decreased systolic function. The estimated ejection fraction is 10%.  · Severe right ventricular enlargement with moderately to severely reduced right ventricular systolic function.  · Indeterminate left ventricular diastolic function.  · Biatrial enlargement.  · Moderate to severe tricuspid regurgitation.  · Mild-to-moderate mitral regurgitation.  · The estimated PA systolic pressure is 44 mmHg.  · Elevated central venous pressure (15 mmHg).      Results for orders placed or performed during the hospital encounter of 07/22/23   EKG 12-lead    Collection Time: 07/22/23  2:50 AM    Narrative    Test Reason : R07.9,    Vent. Rate : 141 BPM     Atrial Rate : 000 BPM     P-R Int : 000 ms          QRS Dur : 194 ms      QT Int : 340 ms       P-R-T Axes : 000 -36 107 degrees     QTc Int : 520 ms    ST with short MT  Left axis deviation  Left bundle branch block like IVCD  Cannot exclude  anterior and inferior infarcts vs due to IVCD  Abnormal ECG  When compared with ECG of 18-APR-2023 21:07,  Rate has increased  Confirmed by Hill ALLEN MD ([id number]) on 7/22/2023 8:18:03 AM    Referred By: WADE MENDEZ           Confirmed By:Hill ALLEN MD             Assessment / Plan:   Patient is a 38 yo black male with stage d HFrEF, NICM, ? 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 He underwent RVAD removal and chest closure 6/30/2022.  He was weaned off  but he had to restarted due to RVF. He was eventually transitioned to milrinone (secondary to  shortage) now on 0.25 mcg/kg/min.  Patient also has history of driveline infection.  Patient presented to the emergency room today because of pleuritic chest pain that has been going on for the last 3 days however it was more intense today.  Also complains of having shortness of breath and fevers associated with it.  He was spiking fever of 102 in the emergency room with elevated white blood cell count up to 17,000. He was treated with Zosyn.  He was sent here for admission.  Patient was admitted a month back for COVID infection for which he received remdesivir for 3 days.  He is on doxycycline for chronic driveline infection.  He is on Milrinone for RV failure.  Patient denies having any low flow alarms on the pump.  Also denies having any lower extremity edema, increased discharge from his driveline site.  Patient has ground-glass opacities on imaging from outside hospital.      * Severe sepsis  Patient presents with elevated fevers, white blood cell count, elevated lactic acid.  Source likely pneumonia.  Will check for COVID  Will start him on broad-spectrum antibiotics with vancomycin and Zosyn.    -ID consulted, appreciate recs  -Initial Bcx positive so will repeat them today, will remove PICC  and place a central line in the meantime to continue milrinone     CAP (community acquired pneumonia)  Covid +  Patient has ground-glass opacities with consolidation findings on imaging from outside hospital.  Will get a chest x-ray here.  Also complains of having pleuritic chest pain which is worse on deep inspirations  Currently spiking fevers.  -Continue Vanc and Zosyn  -ID consulted, may need remdesivir + steroids     Acute hypoxic respiratory failure  Likely from pneumonia.  Currently requiring 3 L to keep his sats above 92.        Seizure-like activity  On on Keppra     LVAD (left ventricular assist device) present  Procedure: Device Interrogation Including analysis of device parameters  Current Settings: Ventricular Assist Device  Review of device function is stable/unstable stable     TXP LVAD INTERROGATIONS 7/23/2023 7/23/2023 7/23/2023 7/23/2023 7/23/2023 7/23/2023 7/23/2023   Type HeartMate3 HeartMate3 HeartMate3 HeartMate3 HeartMate3 HeartMate3 HeartMate3   Flow 4.8 4.7 4.9 4.6 4.5 4.5 4.5   Speed 5100 5100 5100 5100 5100 5100 5100   PI 2.8 3.1 2.6 3.5 4.1 4.3 4.4   Power (Serna) 3.6 3.6 3.6 3.7 3.8 3.7 3.7   LSL 4700 4700 4700 4700 4700 4700 4700   Low Flow Alarm - - - - - - -   High Power Alarm - - - - - - -   Pulsatility Intermittent pulse Intermittent pulse Intermittent pulse Intermittent pulse Intermittent pulse Intermittent pulse Intermittent pulse          On coumadin for for anticoagulation.  Check INR  On Milrinone and oral Lasix for RV failure     Type 2 diabetes mellitus with hyperglycemia  Will keep him on sliding scale and a.c. HS     Chronic combined systolic and diastolic heart failure  Status post LVAD.  On GDMT with valsartan and Aldactone    Supra therapeutic INR   ->10 on admission, given Vit K, will continue heparin drip for now    Gilles Hill MD  Cardiovascular diseases  Transplant Heart  [facility] - Surgical Intensive Care

## 2023-07-23 NOTE — NURSING
"      SICU PLAN OF CARE NOTE    Dx: Severe sepsis    Shift Events: PICC line removed. TLC placed at bedside. Heparin gtt started    Goals of Care: Monitor infection control    Neuro: AAO x4    Vital Signs: BP (!) 78/0 (BP Location: Left arm, Patient Position: Lying)   Pulse (!) 114   Temp 98.3 °F (36.8 °C) (Oral)   Resp (!) 27   Ht 6' 3" (1.905 m)   Wt 98.7 kg (217 lb 9.5 oz)   SpO2 97%   BMI 27.20 kg/m²     Respiratory: Nasal Cannula    Diet: Cardiac Diet    Gtts: Milrinone and Heparin    Urine Output: Voids Spontaneously 300-600 cc/2 hours       Labs/Accuchecks: Accu ACHS; trending labs.    Skin: No new skin breakdown noted. Pt repositions independently with weight shift assistance provided and frequent weight shift encouraged. VAD dsg change completed by RN. Complete bath and linen change. PICC removed per order; catheter tip intact, verified by RN x2.       "

## 2023-07-23 NOTE — PROCEDURES
[facility] - Surgical Intensive Care  Central Venous Catheter  Procedure Note    SUMMARY     Date of Procedure: 7/23/2023     Procedure: Insertion of Central Venous Catheter    Provider: Gilles Hill MD    Assisting Provider:     Indications: vascular access     Pre-Procedure Diagnosis: LVAD present    Post-Procedure Diagnosis: Lvad present    Anesthesia: local lidocaine     Technical Procedures Used:     Description of the Findings of the Procedure:    Informed consent was obtained for the procedure, including sedation.  Risks of lung perforation, hemorrhage, arrhythmia, and adverse drug reaction were discussed.     Maximum sterile technique was used including antiseptics, cap, gloves, gown, hand hygiene, mask, and sheet.    Under sterile conditions the skin above the on the right internal jugular vein was prepped with betadine and covered with a sterile drape. Local anesthesia was applied to the skin and subcutaneous tissues. A 22-gauge needle was used to identify the vein. An 18-gauge needle was then inserted into the vein. A guide wire was then passed easily through the catheter. There were no arrhythmias, occasional PVCs. The catheter was then withdrawn. A 7.0 Moroccan triple-lumen was then inserted into the vessel over the guide wire. The catheter was sutured into place.    There were no changes to vital signs. Catheter was flushed with 10 cc NS. Patient did tolerate procedure well.    Recommendations:    CXR ordered to verify placement.    Significant Surgical Tasks Conducted by the Assistant(s), if Applicable:     Complications: No    Estimated Blood Loss (EBL): None      Condition: Fair    Disposition: ICU - extubated and stable.    Gilles Velasquez MD  Cardiology fellow, PGY-VI  Ext: [phone]

## 2023-07-24 ENCOUNTER — TELEPHONE (OUTPATIENT)
Dept: TRANSPLANT | Facility: CLINIC | Age: 38
End: 2023-07-24
Payer: MEDICAID

## 2023-07-24 PROBLEM — J18.9 CAP (COMMUNITY ACQUIRED PNEUMONIA): Status: RESOLVED | Noted: 2023-07-22 | Resolved: 2023-07-24

## 2023-07-24 LAB
ALBUMIN SERPL BCP-MCNC: 2.1 G/DL (ref 3.5–5.2)
ALP SERPL-CCNC: 158 U/L (ref 55–135)
ALT SERPL W/O P-5'-P-CCNC: 32 U/L (ref 10–44)
ANION GAP SERPL CALC-SCNC: 13 MMOL/L (ref 8–16)
ANION GAP SERPL CALC-SCNC: 14 MMOL/L (ref 8–16)
ANISOCYTOSIS BLD QL SMEAR: SLIGHT
APTT PPP: 32.8 SEC (ref 21–32)
APTT PPP: 39 SEC (ref 21–32)
APTT PPP: 40.2 SEC (ref 21–32)
AST SERPL-CCNC: 47 U/L (ref 10–40)
BACTERIA SPEC AEROBE CULT: ABNORMAL
BASOPHILS # BLD AUTO: 0.06 K/UL (ref 0–0.2)
BASOPHILS NFR BLD: 0.2 % (ref 0–1.9)
BILIRUB DIRECT SERPL-MCNC: 1 MG/DL (ref 0.1–0.3)
BILIRUB SERPL-MCNC: 1.6 MG/DL (ref 0.1–1)
BNP SERPL-MCNC: 348 PG/ML (ref 0–99)
BUN SERPL-MCNC: 34 MG/DL (ref 6–20)
BUN SERPL-MCNC: 36 MG/DL (ref 6–20)
BURR CELLS BLD QL SMEAR: ABNORMAL
CALCIUM SERPL-MCNC: 9.3 MG/DL (ref 8.7–10.5)
CALCIUM SERPL-MCNC: 9.4 MG/DL (ref 8.7–10.5)
CHLORIDE SERPL-SCNC: 89 MMOL/L (ref 95–110)
CHLORIDE SERPL-SCNC: 90 MMOL/L (ref 95–110)
CO2 SERPL-SCNC: 28 MMOL/L (ref 23–29)
CO2 SERPL-SCNC: 30 MMOL/L (ref 23–29)
CREAT SERPL-MCNC: 1.4 MG/DL (ref 0.5–1.4)
CREAT SERPL-MCNC: 1.5 MG/DL (ref 0.5–1.4)
CRP SERPL-MCNC: 253.4 MG/L (ref 0–8.2)
DIFFERENTIAL METHOD: ABNORMAL
EOSINOPHIL # BLD AUTO: 0 K/UL (ref 0–0.5)
EOSINOPHIL NFR BLD: 0 % (ref 0–8)
ERYTHROCYTE [DISTWIDTH] IN BLOOD BY AUTOMATED COUNT: 21.7 % (ref 11.5–14.5)
EST. GFR  (NO RACE VARIABLE): >60 ML/MIN/1.73 M^2
EST. GFR  (NO RACE VARIABLE): >60 ML/MIN/1.73 M^2
GLUCOSE SERPL-MCNC: 174 MG/DL (ref 70–110)
GLUCOSE SERPL-MCNC: 208 MG/DL (ref 70–110)
HCT VFR BLD AUTO: 31.9 % (ref 40–54)
HGB BLD-MCNC: 10.7 G/DL (ref 14–18)
HYPOCHROMIA BLD QL SMEAR: ABNORMAL
IMM GRANULOCYTES # BLD AUTO: 0.4 K/UL (ref 0–0.04)
IMM GRANULOCYTES NFR BLD AUTO: 1.2 % (ref 0–0.5)
INR PPP: 1.2 (ref 0.8–1.2)
LDH SERPL L TO P-CCNC: 409 U/L (ref 110–260)
LYMPHOCYTES # BLD AUTO: 1.3 K/UL (ref 1–4.8)
LYMPHOCYTES NFR BLD: 4.1 % (ref 18–48)
MAGNESIUM SERPL-MCNC: 2 MG/DL (ref 1.6–2.6)
MAGNESIUM SERPL-MCNC: 2 MG/DL (ref 1.6–2.6)
MCH RBC QN AUTO: 26.8 PG (ref 27–31)
MCHC RBC AUTO-ENTMCNC: 33.5 G/DL (ref 32–36)
MCV RBC AUTO: 80 FL (ref 82–98)
MONOCYTES # BLD AUTO: 2 K/UL (ref 0.3–1)
MONOCYTES NFR BLD: 6.1 % (ref 4–15)
NEUTROPHILS # BLD AUTO: 29 K/UL (ref 1.8–7.7)
NEUTROPHILS NFR BLD: 88.4 % (ref 38–73)
NRBC BLD-RTO: 0 /100 WBC
OVALOCYTES BLD QL SMEAR: ABNORMAL
PHOSPHATE SERPL-MCNC: 3.7 MG/DL (ref 2.7–4.5)
PLATELET # BLD AUTO: 460 K/UL (ref 150–450)
PLATELET BLD QL SMEAR: ABNORMAL
PMV BLD AUTO: 10.1 FL (ref 9.2–12.9)
POCT GLUCOSE: 151 MG/DL (ref 70–110)
POCT GLUCOSE: 213 MG/DL (ref 70–110)
POCT GLUCOSE: 258 MG/DL (ref 70–110)
POCT GLUCOSE: 273 MG/DL (ref 70–110)
POCT GLUCOSE: >500 MG/DL (ref 70–110)
POIKILOCYTOSIS BLD QL SMEAR: SLIGHT
POLYCHROMASIA BLD QL SMEAR: ABNORMAL
POTASSIUM SERPL-SCNC: 3.8 MMOL/L (ref 3.5–5.1)
POTASSIUM SERPL-SCNC: 4 MMOL/L (ref 3.5–5.1)
PREALB SERPL-MCNC: 9 MG/DL (ref 20–43)
PROT SERPL-MCNC: 7.4 G/DL (ref 6–8.4)
PROTHROMBIN TIME: 12.3 SEC (ref 9–12.5)
RBC # BLD AUTO: 3.99 M/UL (ref 4.6–6.2)
SODIUM SERPL-SCNC: 131 MMOL/L (ref 136–145)
SODIUM SERPL-SCNC: 133 MMOL/L (ref 136–145)
TARGETS BLD QL SMEAR: ABNORMAL
WBC # BLD AUTO: 32.77 K/UL (ref 3.9–12.7)

## 2023-07-24 PROCEDURE — 80048 BASIC METABOLIC PNL TOTAL CA: CPT | Performed by: HOSPITALIST

## 2023-07-24 PROCEDURE — 63600175 PHARM REV CODE 636 W HCPCS: Performed by: HOSPITALIST

## 2023-07-24 PROCEDURE — 93750 PR INTERROGATE VENT ASSIST DEV, IN PERSON, W PHYSICIAN ANALYSIS: ICD-10-PCS | Mod: ,,, | Performed by: INTERNAL MEDICINE

## 2023-07-24 PROCEDURE — 63600175 PHARM REV CODE 636 W HCPCS: Performed by: STUDENT IN AN ORGANIZED HEALTH CARE EDUCATION/TRAINING PROGRAM

## 2023-07-24 PROCEDURE — 97161 PT EVAL LOW COMPLEX 20 MIN: CPT

## 2023-07-24 PROCEDURE — 94761 N-INVAS EAR/PLS OXIMETRY MLT: CPT

## 2023-07-24 PROCEDURE — 85610 PROTHROMBIN TIME: CPT | Performed by: HOSPITALIST

## 2023-07-24 PROCEDURE — 99233 SBSQ HOSP IP/OBS HIGH 50: CPT | Mod: ,,, | Performed by: NURSE PRACTITIONER

## 2023-07-24 PROCEDURE — 83615 LACTATE (LD) (LDH) ENZYME: CPT | Performed by: HOSPITALIST

## 2023-07-24 PROCEDURE — 80076 HEPATIC FUNCTION PANEL: CPT | Performed by: HOSPITALIST

## 2023-07-24 PROCEDURE — 99233 PR SUBSEQUENT HOSPITAL CARE,LEVL III: ICD-10-PCS | Mod: ,,, | Performed by: NURSE PRACTITIONER

## 2023-07-24 PROCEDURE — 85730 THROMBOPLASTIN TIME PARTIAL: CPT | Mod: 91 | Performed by: STUDENT IN AN ORGANIZED HEALTH CARE EDUCATION/TRAINING PROGRAM

## 2023-07-24 PROCEDURE — 96372 THER/PROPH/DIAG INJ SC/IM: CPT

## 2023-07-24 PROCEDURE — 25000003 PHARM REV CODE 250: Performed by: STUDENT IN AN ORGANIZED HEALTH CARE EDUCATION/TRAINING PROGRAM

## 2023-07-24 PROCEDURE — 63600175 PHARM REV CODE 636 W HCPCS: Performed by: INTERNAL MEDICINE

## 2023-07-24 PROCEDURE — 99232 PR SUBSEQUENT HOSPITAL CARE,LEVL II: ICD-10-PCS | Mod: ,,, | Performed by: NURSE PRACTITIONER

## 2023-07-24 PROCEDURE — 83880 ASSAY OF NATRIURETIC PEPTIDE: CPT | Performed by: HOSPITALIST

## 2023-07-24 PROCEDURE — 25000003 PHARM REV CODE 250: Performed by: NURSE PRACTITIONER

## 2023-07-24 PROCEDURE — 86140 C-REACTIVE PROTEIN: CPT | Performed by: HOSPITALIST

## 2023-07-24 PROCEDURE — 84134 ASSAY OF PREALBUMIN: CPT | Performed by: HOSPITALIST

## 2023-07-24 PROCEDURE — 25000003 PHARM REV CODE 250: Performed by: HOSPITALIST

## 2023-07-24 PROCEDURE — 27000221 HC OXYGEN, UP TO 24 HOURS

## 2023-07-24 PROCEDURE — 85025 COMPLETE CBC W/AUTO DIFF WBC: CPT | Performed by: HOSPITALIST

## 2023-07-24 PROCEDURE — 20600001 HC STEP DOWN PRIVATE ROOM

## 2023-07-24 PROCEDURE — 25000003 PHARM REV CODE 250: Performed by: INTERNAL MEDICINE

## 2023-07-24 PROCEDURE — 83735 ASSAY OF MAGNESIUM: CPT | Performed by: HOSPITALIST

## 2023-07-24 PROCEDURE — 80048 BASIC METABOLIC PNL TOTAL CA: CPT | Mod: 91 | Performed by: STUDENT IN AN ORGANIZED HEALTH CARE EDUCATION/TRAINING PROGRAM

## 2023-07-24 PROCEDURE — 99233 PR SUBSEQUENT HOSPITAL CARE,LEVL III: ICD-10-PCS | Mod: ,,, | Performed by: INTERNAL MEDICINE

## 2023-07-24 PROCEDURE — 97165 OT EVAL LOW COMPLEX 30 MIN: CPT

## 2023-07-24 PROCEDURE — 85730 THROMBOPLASTIN TIME PARTIAL: CPT | Mod: 91 | Performed by: NURSE PRACTITIONER

## 2023-07-24 PROCEDURE — 93750 INTERROGATION VAD IN PERSON: CPT | Mod: ,,, | Performed by: INTERNAL MEDICINE

## 2023-07-24 PROCEDURE — 99233 SBSQ HOSP IP/OBS HIGH 50: CPT | Mod: ,,, | Performed by: INTERNAL MEDICINE

## 2023-07-24 PROCEDURE — 99232 SBSQ HOSP IP/OBS MODERATE 35: CPT | Mod: ,,, | Performed by: NURSE PRACTITIONER

## 2023-07-24 PROCEDURE — 99900035 HC TECH TIME PER 15 MIN (STAT)

## 2023-07-24 PROCEDURE — 84100 ASSAY OF PHOSPHORUS: CPT | Performed by: HOSPITALIST

## 2023-07-24 PROCEDURE — 83735 ASSAY OF MAGNESIUM: CPT | Mod: 91 | Performed by: STUDENT IN AN ORGANIZED HEALTH CARE EDUCATION/TRAINING PROGRAM

## 2023-07-24 PROCEDURE — 85730 THROMBOPLASTIN TIME PARTIAL: CPT | Performed by: STUDENT IN AN ORGANIZED HEALTH CARE EDUCATION/TRAINING PROGRAM

## 2023-07-24 PROCEDURE — 63600175 PHARM REV CODE 636 W HCPCS: Mod: JZ,TB | Performed by: STUDENT IN AN ORGANIZED HEALTH CARE EDUCATION/TRAINING PROGRAM

## 2023-07-24 PROCEDURE — 27000248 HC VAD-ADDITIONAL DAY

## 2023-07-24 PROCEDURE — 82962 GLUCOSE BLOOD TEST: CPT

## 2023-07-24 RX ORDER — ASPIRIN 81 MG/1
81 TABLET ORAL DAILY
Status: DISCONTINUED | OUTPATIENT
Start: 2023-07-25 | End: 2023-08-09 | Stop reason: HOSPADM

## 2023-07-24 RX ORDER — POTASSIUM CHLORIDE 20 MEQ/1
40 TABLET, EXTENDED RELEASE ORAL ONCE
Status: COMPLETED | OUTPATIENT
Start: 2023-07-24 | End: 2023-07-24

## 2023-07-24 RX ORDER — WARFARIN 4 MG/1
4 TABLET ORAL DAILY
Status: DISCONTINUED | OUTPATIENT
Start: 2023-07-24 | End: 2023-07-28

## 2023-07-24 RX ORDER — POLYETHYLENE GLYCOL 3350 17 G/17G
17 POWDER, FOR SOLUTION ORAL DAILY
Status: DISCONTINUED | OUTPATIENT
Start: 2023-07-24 | End: 2023-08-09 | Stop reason: HOSPADM

## 2023-07-24 RX ORDER — FUROSEMIDE 40 MG/1
80 TABLET ORAL DAILY
Status: DISCONTINUED | OUTPATIENT
Start: 2023-07-24 | End: 2023-07-24

## 2023-07-24 RX ORDER — FUROSEMIDE 80 MG/1
80 TABLET ORAL DAILY
Status: DISCONTINUED | OUTPATIENT
Start: 2023-07-25 | End: 2023-07-27

## 2023-07-24 RX ORDER — AMOXICILLIN 250 MG
2 CAPSULE ORAL 2 TIMES DAILY
Status: DISCONTINUED | OUTPATIENT
Start: 2023-07-24 | End: 2023-08-09 | Stop reason: HOSPADM

## 2023-07-24 RX ADMIN — INSULIN ASPART 2 UNITS: 100 INJECTION, SOLUTION INTRAVENOUS; SUBCUTANEOUS at 08:07

## 2023-07-24 RX ADMIN — INSULIN ASPART 10 UNITS: 100 INJECTION, SOLUTION INTRAVENOUS; SUBCUTANEOUS at 11:07

## 2023-07-24 RX ADMIN — INSULIN ASPART 10 UNITS: 100 INJECTION, SOLUTION INTRAVENOUS; SUBCUTANEOUS at 08:07

## 2023-07-24 RX ADMIN — INSULIN ASPART 4 UNITS: 100 INJECTION, SOLUTION INTRAVENOUS; SUBCUTANEOUS at 11:07

## 2023-07-24 RX ADMIN — BUSPIRONE HYDROCHLORIDE 10 MG: 10 TABLET ORAL at 08:07

## 2023-07-24 RX ADMIN — INSULIN ASPART 10 UNITS: 100 INJECTION, SOLUTION INTRAVENOUS; SUBCUTANEOUS at 04:07

## 2023-07-24 RX ADMIN — POTASSIUM CHLORIDE 40 MEQ: 1500 TABLET, EXTENDED RELEASE ORAL at 05:07

## 2023-07-24 RX ADMIN — HYDROCODONE BITARTRATE AND ACETAMINOPHEN 1 TABLET: 5; 325 TABLET ORAL at 02:07

## 2023-07-24 RX ADMIN — SENNOSIDES AND DOCUSATE SODIUM 2 TABLET: 50; 8.6 TABLET ORAL at 08:07

## 2023-07-24 RX ADMIN — HYDROCODONE BITARTRATE AND ACETAMINOPHEN 1 TABLET: 5; 325 TABLET ORAL at 11:07

## 2023-07-24 RX ADMIN — WARFARIN SODIUM 4 MG: 4 TABLET ORAL at 04:07

## 2023-07-24 RX ADMIN — CEFAZOLIN 6 G: 2 INJECTION, POWDER, FOR SOLUTION INTRAMUSCULAR; INTRAVENOUS at 10:07

## 2023-07-24 RX ADMIN — POTASSIUM CHLORIDE 40 MEQ: 1500 TABLET, EXTENDED RELEASE ORAL at 08:07

## 2023-07-24 RX ADMIN — DEXAMETHASONE 6 MG: 4 TABLET ORAL at 08:07

## 2023-07-24 RX ADMIN — REMDESIVIR 100 MG: 100 INJECTION, POWDER, LYOPHILIZED, FOR SOLUTION INTRAVENOUS at 08:07

## 2023-07-24 RX ADMIN — MILRINONE LACTATE 0.25 MCG/KG/MIN: 0.2 INJECTION, SOLUTION INTRAVENOUS at 12:07

## 2023-07-24 RX ADMIN — LEVETIRACETAM 1000 MG: 500 TABLET, FILM COATED ORAL at 08:07

## 2023-07-24 RX ADMIN — POLYETHYLENE GLYCOL 3350 17 G: 17 POWDER, FOR SOLUTION ORAL at 08:07

## 2023-07-24 RX ADMIN — MIRTAZAPINE 15 MG: 15 TABLET, FILM COATED ORAL at 08:07

## 2023-07-24 RX ADMIN — HYDROCODONE BITARTRATE AND ACETAMINOPHEN 1 TABLET: 5; 325 TABLET ORAL at 05:07

## 2023-07-24 RX ADMIN — PIPERACILLIN SODIUM AND TAZOBACTAM SODIUM 4.5 G: 4; .5 INJECTION, POWDER, FOR SOLUTION INTRAVENOUS at 12:07

## 2023-07-24 RX ADMIN — VANCOMYCIN HYDROCHLORIDE 1500 MG: 1.5 INJECTION, POWDER, LYOPHILIZED, FOR SOLUTION INTRAVENOUS at 03:07

## 2023-07-24 RX ADMIN — INSULIN ASPART 6 UNITS: 100 INJECTION, SOLUTION INTRAVENOUS; SUBCUTANEOUS at 04:07

## 2023-07-24 RX ADMIN — HEPARIN SODIUM 18 UNITS/KG/HR: 10000 INJECTION, SOLUTION INTRAVENOUS at 03:07

## 2023-07-24 RX ADMIN — MILRINONE LACTATE 0.25 MCG/KG/MIN: 0.2 INJECTION, SOLUTION INTRAVENOUS at 01:07

## 2023-07-24 RX ADMIN — HEPARIN SODIUM 20 UNITS/KG/HR: 10000 INJECTION, SOLUTION INTRAVENOUS at 07:07

## 2023-07-24 NOTE — NURSING TRANSFER
Nursing Transfer Note      7/24/2023   4:39 PM    Nurse giving handoff:SONIA Urena RN  Nurse receiving handoff:REZA Anders RN    Reason patient is being transferred: Stepdown    Transfer To: [id number]    Transfer via wheelchair    Transfer with RN to O2, cardiac monitoring    Transported by RN      Telemetry: Rate 109 and Rhythm ST  Order for Tele Monitor? Yes    Additional Lines: Oxygen    4eyes on Skin: no    Medicines sent: Yes    Any special needs or follow-up needed: N/A    Patient belongings transferred with patient: Yes    Chart send with patient: Yes    Notified: Significant other    Patient reassessed at: 7/24/22 @ 1630  Upon arrival to floor: cardiac monitor applied, patient oriented to room, call bell in reach, and bed in lowest position

## 2023-07-24 NOTE — PLAN OF CARE
"      SICU PLAN OF CARE NOTE    Dx: Severe sepsis    Shift Events: NAEON    Goals of Care: Doppler <90    Neuro: AAO x4, Follows Commands, and Moves All Extremities    Vital Signs: BP (!) 82/0 (BP Location: Left arm, Patient Position: Lying)   Pulse 108   Temp 97.8 °F (36.6 °C) (Oral)   Resp 20   Ht 6' 3" (1.905 m)   Wt 95.8 kg (211 lb 3.2 oz)   SpO2 95%   BMI 26.40 kg/m²     Cardiac:ST    Respiratory: Nasal Cannula 2L    Diet: Cardiac Diet    Gtts: Milrinone and Heparin    Urine Output: Voids Spontaneously 1900 cc/shift     Labs/Accuchecks: Daily labs/accucheck ACHS.    Skin: Bed plugged in with pads and foams in place. Pt able to weight shift and turn independently.Driveline exit site noted to LRQ. No new skin breakdown noted upon assessment.       "

## 2023-07-24 NOTE — PROGRESS NOTES
[facility] - Surgical Intensive Care  Infectious Disease  Progress Note    Patient Name: Kevan Queen  MRN: [id number]  Admission Date: 7/22/2023  Length of Stay: 1 days  Attending Physician: Josh Pulido MD  Primary Care Provider: ORALIA Cline    Isolation Status: No active isolations  Assessment/Plan:      Pulmonary  Acute respiratory failure with hypoxia  Secondary to COVID-19 infection.  May also be related to sepsis from bacteremia.      Plan    1. Remdesivir for 5 days.    2. Dexamethasone for 10 days.    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  Cultures obtained on this admission are positive for MSSA as well.      Plan  1. Change antibiotics to IV cefazolin.    Staphylococcus aureus bacteremia  38 year old male with a history of MSSA infection of his drive line exit site.  He is admitted with sepsis.  Blood cultures are now positive for MSSA.    Plan    1. Discontinue vancomycin and discontinue zosyn.    2. Start cefazolin 6 grams IV q 24 hours by continuous infusion.          Anticipated Disposition: TBD    Thank you for your consult. I will follow-up with patient. Please contact us if you have any additional questions.    Benny Paul MD  Infectious Disease  [facility] - Surgical Intensive Care    Subjective:     Principal Problem:Severe sepsis    HPI: 38 year old male with a history of CHF s/p LVAD placement in June of 2022.  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  He was apparently on room air for most of that hospital stay.  He received 3 days of remdesivir.  He was discharged on oral doxycycline to complete a 14 day course for the MSSA drive line infection.  He had tested negative for COVID-19 following his treatment.  He is re-admitted with complaints of shortness of breath, chest pains and generalized ill  feeling. COVID-19 testing is positive again.  Chest x-ray shows diffuse infiltrates bilaterally.  ID is consulted to assist with his management.      Interval History: Still feeling ill.    Review of Systems   Constitutional:  Positive for fatigue.   Respiratory:  Positive for shortness of breath.    Cardiovascular:  Positive for chest pain.   All other systems reviewed and are negative.  Objective:     Vital Signs (Most Recent):  Temp: 98.3 °F (36.8 °C) (07/23/23 1900)  Pulse: (!) 124 (07/23/23 2013)  Resp: (!) 21 (07/23/23 2013)  BP: (!) 88/0 (07/23/23 1900)  SpO2: 95 % (07/23/23 2013) Vital Signs (24h Range):  Temp:  [98.3 °F (36.8 °C)-99.1 °F (37.3 °C)] 98.3 °F (36.8 °C)  Pulse:  [109-133] 124  Resp:  [15-66] 21  SpO2:  [93 %-98 %] 95 %  BP: (76-88)/(0) 88/0     Weight: 98.7 kg (217 lb 9.5 oz)  Body mass index is 27.2 kg/m².    Estimated Creatinine Clearance: 92.1 mL/min (based on SCr of 1.3 mg/dL).     Physical Exam  Vitals and nursing note reviewed.   Constitutional:       Appearance: He is ill-appearing.   Eyes:      General: No scleral icterus.        Right eye: No discharge.         Left eye: No discharge.   Pulmonary:      Effort: No respiratory distress.   Abdominal:      General: There is no distension.      Comments: Dressings covering LVAD drive line exit site.   Lymphadenopathy:      Cervical: No cervical adenopathy.   Neurological:      General: No focal deficit present.      Mental Status: He is alert and oriented to person, place, and time.        Significant Labs:   Microbiology Results (last 7 days)       Procedure Component Value Units Date/Time    Blood culture [[id number]] Collected: 07/23/23 1305    Order Status: Sent Specimen: Blood from Line, Jugular, Internal Right Updated: 07/23/23 1437    Aerobic culture [[id number]]  (Abnormal) Collected: 07/22/23 0159    Order Status: Completed Specimen: Abscess from Abdomen Updated: 07/23/23 0850     Aerobic Bacterial Culture STAPHYLOCOCCUS  AUREUS  Few  Susceptibility pending      Blood culture [[id number]] Collected: 07/22/23 1058    Order Status: Completed Specimen: Blood from Peripheral, Hand, Right Updated: 07/23/23 0642     Blood Culture, Routine Gram stain aer bottle: Gram positive cocci in clusters resembling Staph      Results called to and read back by:Zaheer Rae RN 07/23/2023  06:41    MRSA/SA Rapid ID by PCR from Blood culture [[id number]]  (Abnormal) Collected: 07/22/23 1058    Order Status: Completed Updated: 07/23/23 0557     Staph aureus ID by PCR Positive     Methicillin Resistant ID by PCR Negative    Culture, Anaerobe [[id number]] Collected: 07/22/23 0159    Order Status: Completed Specimen: Abscess from Abdomen Updated: 07/23/23 0551     Anaerobic Culture Culture in progress    Blood culture [[id number]]     Order Status: Sent Specimen: Blood             Significant Imaging: I have reviewed all pertinent imaging results/findings within the past 24 hours.

## 2023-07-24 NOTE — TELEPHONE ENCOUNTER
7/21/23 2030: Robyn paged to ask about Kevan's transfer and when it would occur.  I explained that is all handled by the transfer center and once a bed is secured, the transfer will be completed.      7/22/23 1250:  Robyn paged again.  She said someone from [facility] has been calling her all morning and she can't answer.  She said she is having problems with her phone. She said she was scared because Kevan told her is was bad and she wanted to know if he was ok.  She told me the nurse won't give her any information and Kevan won't answer his cell.    I called the nurse to obtain information on him.  Almita told me he is good, sleeping right now which is why he didn't answer his phone.  She told me he has covid and being treated.    I asked Dr. Moya to call wife when he was able to and provided the number that Robyn provided to me.  He said he will call her. I let Robyn know he will call her.  She wanted to know what time he would call her.  I explained I don't know what's going on with the service and when he will have time, but he said he will call.     7/23/23 0730: Robyn paged asking when Dr. Moya will call her because he didn't call yet.  I communicated with Dr. Moya who said he tried to call her but no one answered.  Robyn seemed upset.  I told her I explained to Dr. Moya that she was having trouble with her phone.  HE said he would call her again after rounds today.

## 2023-07-24 NOTE — CARE UPDATE
Hemodynamics     8PM    CVP 3  SvO2 48  CO 5.17   CI 2.25   SVR 1082   UOP  400 ml/hr  Net neg1.4     MAP 73  Hgb (in g/dL) 10.6  Wt (in kg) 98.7  BSA 2.29  SaO2 95    Continuous Infusions:   heparin (porcine) in D5W 15 Units/kg/hr (07/23/23 2100)    milrinone 20mg/100ml D5W (200mcg/ml) 0.25 mcg/kg/min (07/23/23 2100)       Intake/Output Summary (Last 24 hours) at 7/23/2023 2159  Last data filed at 7/23/2023 2152  Gross per 24 hour   Intake 1566.61 ml   Output 4500 ml   Net -2933.39 ml       Recs:   - BMP and mg tonight     Case discussed with on call attending.    Carl Diaz MD  Cardiology Fellow, PGY4

## 2023-07-24 NOTE — PLAN OF CARE
Patient stepped down this shift. Plan of care discussed with patient. Patient ambulating independently, fall precautions in place. On 2L NC, maintaining O2 sats >92%. LVAD DP and numbers WNL, smooth LVAD hum. Pain managed with PO analgesics. Heparin infusing per nomogram. Milrinone and continuous Ancef infusing. Discussed medications and care. Patient has no questions at this time. Will continue to monitor.

## 2023-07-24 NOTE — ASSESSMENT & PLAN NOTE
Patient presents with elevated fevers, white blood cell count, elevated lactic acid.    ID following. Possible Secondary to COVID-19 infection.    Continue:    1. Remdesivir for 5 days.   2. Dexamethasone for 10 days.  History of MSSA infection of drive line.  Cultures obtained on this admission are positive for MSSA as well.    Changing to Cefazolin 6 grams IV q 24 hours by continuous infusion.

## 2023-07-24 NOTE — PROGRESS NOTES
[facility] - Surgical Intensive Care  Endocrinology  Progress Note    Admit Date: 7/22/2023     Reason for Consult: Management of T2DM, Hyperglycemia     Surgical Procedure and Date: HM3 implantation 6/23/2022    Diabetes diagnosis year:  2022    Home Diabetes Medications:  Levemir 22 units BID, Novolog 16 units TID with meals in addition to SSI (151-200/+1)     How often checking glucose at home? Freestyle William    BG readings on regimen: 170-low 200s  Hypoglycemia on the regimen?  No  Missed doses on regimen?  Yes    Diabetes Complications include:     Hyperglycemia     Complicating diabetes co morbidities:   History of MI, CHF, and CKD         HPI:   Patient is a 38 y.o. 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 He underwent RVAD removal and chest closure 6/30/2022.  He was weaned off  but he had to restarted due to RVF. He was eventually transitioned to milrinone (secondary to  shortage) now on 0.25 mcg/kg/min.  Patient also has history of driveline infection.  Patient presented to the emergency room today because of pleuritic chest pain that has been going on for the last 3 days however it was more intense today.  Also complains of having shortness of breath and fevers associated with it.  He was spiking fever of 102 in the emergency room with elevated white blood cell count up to 17,000. He was treated with Zosyn.  He was sent here for admission.  Patient was admitted a month back for COVID infection for which he received remdesivir for 3 days. Patient has ground-glass opacities on imaging from outside hospital. Endocrinology consulted for management of T2DM.      Lab Results   Component Value Date    HGBA1C 6.7 (H) 07/22/2023           Interval HPI:   Overnight events: Remains in SICU. BG well  "controlled and within goal ranges on current SQ insulin regimen. Remains on Dex 6 mg daily. Diet Vegan    Eatin%  Nausea: No  Hypoglycemia and intervention: No  Fever: No  TPN and/or TF: No  If yes, type of TF/TPN and rate: n/a    BP (!) 84/0 (BP Location: Left arm, Patient Position: Lying)   Pulse (!) 115   Temp 98.3 °F (36.8 °C) (Oral)   Resp 20   Ht 6' 3" (1.905 m)   Wt 95.8 kg (211 lb 3.2 oz)   SpO2 95%   BMI 26.40 kg/m²     Labs Reviewed and Include    Recent Labs   Lab 23  0322   *   CALCIUM 9.4   ALBUMIN 2.1*   PROT 7.4   *   K 3.8   CO2 30*   CL 90*   BUN 36*   CREATININE 1.5*   ALKPHOS 158*   ALT 32   AST 47*   BILITOT 1.6*     Lab Results   Component Value Date    WBC 32.77 (H) 2023    HGB 10.7 (L) 2023    HCT 31.9 (L) 2023    MCV 80 (L) 2023     (H) 2023     No results for input(s): TSH, FREET4 in the last 168 hours.  Lab Results   Component Value Date    HGBA1C 6.7 (H) 2023       Nutritional status:   Body mass index is 26.4 kg/m².  Lab Results   Component Value Date    ALBUMIN 2.1 (L) 2023    ALBUMIN 2.2 (L) 2023    ALBUMIN 3.2 (L) 2023     Lab Results   Component Value Date    PREALBUMIN 9 (L) 2023    PREALBUMIN 17 (L) 2023    PREALBUMIN 26 2023       Estimated Creatinine Clearance: 79.8 mL/min (A) (based on SCr of 1.5 mg/dL (H)).    Accu-Checks  Recent Labs     23  1108 23  1626 23  2031 23  0800   POCTGLUCOSE 210* 157* 188* 151*       Current Medications and/or Treatments Impacting Glycemic Control  Immunotherapy:    Immunosuppressants       None          Steroids:   Hormones (From admission, onward)      Start     Stop Route Frequency Ordered    23 1400  dexAMETHasone tablet 6 mg          0859 Oral Daily 23 1253          Pressors:    Autonomic Drugs (From admission, onward)      None          Hyperglycemia/Diabetes Medications:   Antihyperglycemics " (From admission, onward)      Start     Stop Route Frequency Ordered    07/23/23 2100  insulin detemir U-100 (Levemir) pen 15 Units         -- SubQ 2 times daily 07/23/23 0910    07/23/23 1130  insulin aspart U-100 pen 10 Units         -- SubQ 3 times daily with meals 07/23/23 0908    07/22/23 1326  insulin aspart U-100 pen 1-10 Units         -- SubQ Before meals & nightly PRN 07/22/23 1226            ASSESSMENT and PLAN    Cardiac/Vascular  LVAD (left ventricular assist device) present  Managed per primary team  Avoid hypoglycemia        ID  * Severe sepsis  Managed per primary team        Endocrine  Type 2 diabetes mellitus with hyperglycemia  BG goal 140-180    Continue Levemir 15 units  BID  Novolog 10 units TID with meals (basal/prandial match)   Continue Moderate Dose Correction Scale  BG monitoring ac/hs    ** Please call Endocrine for any BG related issues **    Discharge plans: TBD    Lab Results   Component Value Date    HGBA1C 6.7 (H) 07/22/2023                 Jody Starkey NP  Endocrinology  [facility] - [facility]

## 2023-07-24 NOTE — SUBJECTIVE & OBJECTIVE
Interval History: Still feeling ill.    Review of Systems   Constitutional:  Positive for fatigue.   Respiratory:  Positive for shortness of breath.    Cardiovascular:  Positive for chest pain.   All other systems reviewed and are negative.  Objective:     Vital Signs (Most Recent):  Temp: 98.3 °F (36.8 °C) (07/23/23 1900)  Pulse: (!) 124 (07/23/23 2013)  Resp: (!) 21 (07/23/23 2013)  BP: (!) 88/0 (07/23/23 1900)  SpO2: 95 % (07/23/23 2013) Vital Signs (24h Range):  Temp:  [98.3 °F (36.8 °C)-99.1 °F (37.3 °C)] 98.3 °F (36.8 °C)  Pulse:  [109-133] 124  Resp:  [15-66] 21  SpO2:  [93 %-98 %] 95 %  BP: (76-88)/(0) 88/0     Weight: 98.7 kg (217 lb 9.5 oz)  Body mass index is 27.2 kg/m².    Estimated Creatinine Clearance: 92.1 mL/min (based on SCr of 1.3 mg/dL).     Physical Exam  Vitals and nursing note reviewed.   Constitutional:       Appearance: He is ill-appearing.   Eyes:      General: No scleral icterus.        Right eye: No discharge.         Left eye: No discharge.   Pulmonary:      Effort: No respiratory distress.   Abdominal:      General: There is no distension.      Comments: Dressings covering LVAD drive line exit site.   Lymphadenopathy:      Cervical: No cervical adenopathy.   Neurological:      General: No focal deficit present.      Mental Status: He is alert and oriented to person, place, and time.        Significant Labs:   Microbiology Results (last 7 days)       Procedure Component Value Units Date/Time    Blood culture [[id number]] Collected: 07/23/23 1305    Order Status: Sent Specimen: Blood from Line, Jugular, Internal Right Updated: 07/23/23 1437    Aerobic culture [[id number]]  (Abnormal) Collected: 07/22/23 0159    Order Status: Completed Specimen: Abscess from Abdomen Updated: 07/23/23 0850     Aerobic Bacterial Culture STAPHYLOCOCCUS AUREUS  Few  Susceptibility pending      Blood culture [[id number]] Collected: 07/22/23 1058    Order Status: Completed Specimen: Blood from Peripheral, Hand,  Right Updated: 07/23/23 0642     Blood Culture, Routine Gram stain aer bottle: Gram positive cocci in clusters resembling Staph      Results called to and read back by:Zaheer Rae RN 07/23/2023  06:41    MRSA/SA Rapid ID by PCR from Blood culture [[id number]]  (Abnormal) Collected: 07/22/23 1058    Order Status: Completed Updated: 07/23/23 0557     Staph aureus ID by PCR Positive     Methicillin Resistant ID by PCR Negative    Culture, Anaerobe [[id number]] Collected: 07/22/23 0159    Order Status: Completed Specimen: Abscess from Abdomen Updated: 07/23/23 0551     Anaerobic Culture Culture in progress    Blood culture [[id number]]     Order Status: Sent Specimen: Blood             Significant Imaging: I have reviewed all pertinent imaging results/findings within the past 24 hours.
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
## 2023-07-24 NOTE — SUBJECTIVE & OBJECTIVE
Interval History: NAEON. No acute complaints. On 2L NC. Has L sided CP with deep inspiration.     Continuous Infusions:   heparin (porcine) in D5W 20 Units/kg/hr (07/24/23 1000)    milrinone 20mg/100ml D5W (200mcg/ml) 0.25 mcg/kg/min (07/24/23 1000)     Scheduled Meds:   busPIRone  10 mg Oral BID    ceFAZolin(ANCEF) in D5W 500 mL CONTINUOUS INFUSION  6 g Intravenous Q24H    dexAMETHasone  6 mg Oral Daily    [START ON 7/25/2023] furosemide  80 mg Oral Daily    insulin aspart U-100  10 Units Subcutaneous TIDWM    insulin detemir U-100  15 Units Subcutaneous BID    levETIRAcetam  1,000 mg Oral BID    mirtazapine  15 mg Oral Nightly    polyethylene glycol  17 g Oral Daily    remdesivir infusion  100 mg Intravenous Daily    senna-docusate 8.6-50 mg  2 tablet Oral BID    warfarin  4 mg Oral Daily     PRN Meds:acetaminophen, dextrose 10%, dextrose 10%, glucagon (human recombinant), glucose, glucose, HYDROcodone-acetaminophen, insulin aspart U-100    Review of patient's allergies indicates:   Allergen Reactions    Aspirin Other (See Comments)     Mr. Thacker is enrolled in Dr. Paige's Alon Trial and cannot have any aspirin and/or aspirin-containing products. DO NOT cancel any orders for INV Aspirin 100 mg/Placebo. If you have questions, please contact Isabel @ [phone], [phone],[email or web address], secure chat or MS Teams message.    Bumex [bumetanide] Hives    Lactose Diarrhea     Other reaction(s): Abdominal distension, gaseous    Torsemide Hives     Objective:     Vital Signs (Most Recent):  Temp: 98.3 °F (36.8 °C) (07/24/23 0800)  Pulse: 109 (07/24/23 1045)  Resp: 14 (07/24/23 1045)  BP: (!) 84/0 (07/24/23 0900)  SpO2: 95 % (07/24/23 0900) Vital Signs (24h Range):  Temp:  [97.8 °F (36.6 °C)-98.5 °F (36.9 °C)] 98.3 °F (36.8 °C)  Pulse:  [101-131] 109  Resp:  [12-66] 14  SpO2:  [93 %-97 %] 95 %  BP: (76-88)/(0) 84/0     Patient Vitals for the past 72 hrs (Last 3 readings):   Weight   07/24/23 0439 95.8 kg (211 lb  3.2 oz)   07/23/23 1006 98.7 kg (217 lb 9.5 oz)   07/22/23 0344 98.9 kg (218 lb 0.6 oz)       Body mass index is 26.4 kg/m².      Intake/Output Summary (Last 24 hours) at 7/24/2023 1103  Last data filed at 7/24/2023 1000  Gross per 24 hour   Intake 1498.62 ml   Output 2675 ml   Net -1176.38 ml          Physical Exam  Constitutional:       Appearance: Normal appearance.   HENT:      Head: Normocephalic and atraumatic.      Mouth/Throat:      Mouth: Mucous membranes are moist.   Cardiovascular:      Rate and Rhythm: Normal rate.      Comments: Normal VAD hum can be heard  Pulmonary:      Effort: Pulmonary effort is normal. No respiratory distress.      Breath sounds: Normal breath sounds. No wheezing or rales.   Abdominal:      General: Abdomen is flat.      Tenderness: There is no abdominal tenderness. There is no guarding.   Musculoskeletal:         General: No swelling.      Cervical back: Normal range of motion and neck supple.   Skin:     General: Skin is warm.      Capillary Refill: Capillary refill takes 2 to 3 seconds.      Comments: R sided PICC line in basilic vein   Neurological:      General: No focal deficit present.      Mental Status: He is alert and oriented to person, place, and time.   Psychiatric:         Mood and Affect: Mood normal.         Behavior: Behavior normal.          Significant Labs:  CBC:  Recent Labs   Lab 07/23/23  0323 07/23/23  0829 07/24/23  0322   WBC 33.14* 34.88* 32.77*   RBC 3.79* 4.00* 3.99*   HGB 10.2* 10.6* 10.7*   HCT 29.6* 31.4* 31.9*    406 460*   MCV 78* 79* 80*   MCH 26.9* 26.5* 26.8*   MCHC 34.5 33.8 33.5       BNP:  Recent Labs   Lab 07/22/23  0248 07/24/23  0322   * 348*       CMP:  Recent Labs   Lab 07/22/23  0452 07/23/23  0323 07/24/23  0040 07/24/23  0322   * 285* 208* 174*   CALCIUM 8.8 9.3 9.3 9.4   ALBUMIN 2.2*  --   --  2.1*   PROT 7.3  --   --  7.4   * 128* 131* 133*   K 4.0 4.0 4.0 3.8   CO2 21* 29 28 30*   CL 93* 88* 89* 90*    BUN 21* 27* 34* 36*   CREATININE 1.4 1.3 1.4 1.5*   ALKPHOS 198*  --   --  158*   ALT 45*  --   --  32   AST 59*  --   --  47*   BILITOT 1.4*  --   --  1.6*        Coagulation:   Recent Labs   Lab 07/23/23  0323 07/23/23  0829 07/23/23  1413 07/23/23  2055 07/24/23  0322   INR 1.3* 1.2  --   --  1.2   APTT 29.9 31.1 31.4 30.2 32.8*       LDH:  Recent Labs   Lab 07/22/23  0140 07/23/23  0323 07/24/23  0322   * 430* 409*       Microbiology:  Microbiology Results (last 7 days)       Procedure Component Value Units Date/Time    Blood culture [[id number]]  (Abnormal) Collected: 07/22/23 1058    Order Status: Completed Specimen: Blood from Peripheral, Hand, Right Updated: 07/24/23 1016     Blood Culture, Routine Gram stain aer bottle: Gram positive cocci in clusters resembling Staph      Results called to and read back by:Zaheer Rae RN 07/23/2023  06:41      STAPHYLOCOCCUS AUREUS  Susceptibility pending  ID consult required at [facility].[place] and [place] locations.      Blood culture [[id number]] Collected: 07/23/23 1305    Order Status: Completed Specimen: Blood from Line, Jugular, Internal Right Updated: 07/24/23 0715     Blood Culture, Routine No Growth to date    Aerobic culture [[id number]]  (Abnormal) Collected: 07/22/23 0159    Order Status: Completed Specimen: Abscess from Abdomen Updated: 07/23/23 0850     Aerobic Bacterial Culture STAPHYLOCOCCUS AUREUS  Few  Susceptibility pending      MRSA/SA Rapid ID by PCR from Blood culture [[id number]]  (Abnormal) Collected: 07/22/23 1058    Order Status: Completed Updated: 07/23/23 0557     Staph aureus ID by PCR Positive     Methicillin Resistant ID by PCR Negative    Culture, Anaerobe [[id number]] Collected: 07/22/23 0159    Order Status: Completed Specimen: Abscess from Abdomen Updated: 07/23/23 0551     Anaerobic Culture Culture in progress    Blood culture [[id number]]     Order Status: Sent Specimen: Blood             I have reviewed all pertinent labs within  the past 24 hours.    Estimated Creatinine Clearance: 79.8 mL/min (A) (based on SCr of 1.5 mg/dL (H)).    Diagnostic Results:  I have reviewed and interpreted all pertinent imaging results/findings within the past 24 hours.

## 2023-07-24 NOTE — ASSESSMENT & PLAN NOTE
-HeartMate 3 Implanted on 6/29/2022 as DT with RV failure on milrinone at 0.25 mcg/kg/min  -Continue Coumadin, Goal INR 2.0-3.0. Sub Therapeutic today. Con Heparin.   -LDH is stable.  Will continue to monitor daily.  -Speed set at 5100, LSL 4700 rpm      Procedure: Device Interrogation Including analysis of device parameters  Current Settings: Ventricular Assist Device  Review of device function is stable/unstable stable    TXP LVAD INTERROGATIONS 7/24/2023 7/24/2023 7/24/2023 7/24/2023 7/24/2023 7/24/2023 7/24/2023   Type HeartMate3 HeartMate3 HeartMate3 HeartMate3 HeartMate3 HeartMate3 HeartMate3   Flow 4.3 4.5 4.5 4.4 4.3 4.6 4.4   Speed 5100 5100 5100 5100 5100 5100 5100   PI 4.5 3.7 4.6 4.7 4.8 3.1 3.9   Power (Serna) 3.7 3.7 3.7 3.7 3.7 3.6 3.7   LSL 4700 4700 4700 4700 4700 4700 4700   Low Flow Alarm - - - - - - -   High Power Alarm - - - - - - -   Pulsatility Intermittent pulse Intermittent pulse Intermittent pulse Intermittent pulse Intermittent pulse Intermittent pulse Intermittent pulse

## 2023-07-24 NOTE — PROGRESS NOTES
07/24/2023  Miracle Caballero    Current provider:  Josh Pulido MD    Device interrogation:  TXP LVAD INTERROGATIONS 7/24/2023 7/24/2023 7/24/2023 7/24/2023 7/24/2023 7/24/2023 7/24/2023   Type HeartMate3 HeartMate3 HeartMate3 HeartMate3 HeartMate3 HeartMate3 HeartMate3   Flow 4.5 4.5 4.4 4.3 4.6 4.4 4.5   Speed 5100 5100 5100 5100 5100 5100 5100   PI 3.7 4.6 4.7 4.8 3.1 3.9 4.2   Power (Serna) 3.7 3.7 3.7 3.7 3.6 3.7 3.6   LSL 4700 4700 4700 4700 4700 4700 4700   Low Flow Alarm - - - - - - -   High Power Alarm - - - - - - -   Pulsatility Intermittent pulse Intermittent pulse Intermittent pulse Intermittent pulse Intermittent pulse Intermittent pulse Intermittent pulse          Rounded on Kevan Queen to ensure all mechanical assist device settings (IABP or VAD) were appropriate and all parameters were within limits.  I was able to ensure all back up equipment was present, the staff had no issues, and the Perfusion Department daily rounding was complete.      For implantable VADs: Interrogation of Ventricular assist device was performed with analysis of device parameters and review of device function. I have personally reviewed the interrogation findings and agree with findings as stated.     In emergency, the nursing units have been notified to contact the perfusion department either by:  Calling [phone] from 630am to 4pm Mon thru Fri, utilizing the On-Call Finder functionality of Epic and searching for Perfusion, or by contacting the hospital  from 4pm to 630am and on weekends and asking to speak with the perfusionist on call.    9:26 AM

## 2023-07-24 NOTE — ASSESSMENT & PLAN NOTE
Secondary to COVID-19 infection.  May also be related to sepsis from bacteremia.      Plan    1. Remdesivir for 5 days.    2. Dexamethasone for 10 days.

## 2023-07-24 NOTE — PLAN OF CARE
Problem: Occupational Therapy  Goal: Occupational Therapy Goal  Description: Pt is currently functioning at their baseline in ADLs and functional mobility. Therefore, further skilled OT intervention is not warranted at this time. Pt is appropriate to discharge home once medically appropriate. Please re-consult if pt's functional status changes.   Outcome: Met

## 2023-07-24 NOTE — SUBJECTIVE & OBJECTIVE
"Interval HPI:   Overnight events: Remains in SICU. BG well controlled and within goal ranges on current SQ insulin regimen. Remains on Dex 6 mg daily. Diet Vegan    Eatin%  Nausea: No  Hypoglycemia and intervention: No  Fever: No  TPN and/or TF: No  If yes, type of TF/TPN and rate: n/a    BP (!) 84/0 (BP Location: Left arm, Patient Position: Lying)   Pulse (!) 115   Temp 98.3 °F (36.8 °C) (Oral)   Resp 20   Ht 6' 3" (1.905 m)   Wt 95.8 kg (211 lb 3.2 oz)   SpO2 95%   BMI 26.40 kg/m²     Labs Reviewed and Include    Recent Labs   Lab 23  0322   *   CALCIUM 9.4   ALBUMIN 2.1*   PROT 7.4   *   K 3.8   CO2 30*   CL 90*   BUN 36*   CREATININE 1.5*   ALKPHOS 158*   ALT 32   AST 47*   BILITOT 1.6*     Lab Results   Component Value Date    WBC 32.77 (H) 2023    HGB 10.7 (L) 2023    HCT 31.9 (L) 2023    MCV 80 (L) 2023     (H) 2023     No results for input(s): TSH, FREET4 in the last 168 hours.  Lab Results   Component Value Date    HGBA1C 6.7 (H) 2023       Nutritional status:   Body mass index is 26.4 kg/m².  Lab Results   Component Value Date    ALBUMIN 2.1 (L) 2023    ALBUMIN 2.2 (L) 2023    ALBUMIN 3.2 (L) 2023     Lab Results   Component Value Date    PREALBUMIN 9 (L) 2023    PREALBUMIN 17 (L) 2023    PREALBUMIN 26 2023       Estimated Creatinine Clearance: 79.8 mL/min (A) (based on SCr of 1.5 mg/dL (H)).    Accu-Checks  Recent Labs     23  1108 23  1626 23  2031 23  0800   POCTGLUCOSE 210* 157* 188* 151*       Current Medications and/or Treatments Impacting Glycemic Control  Immunotherapy:    Immunosuppressants       None          Steroids:   Hormones (From admission, onward)      Start     Stop Route Frequency Ordered    23 1400  dexAMETHasone tablet 6 mg          0859 Oral Daily 23 1253          Pressors:    Autonomic Drugs (From admission, onward)      None      "     Hyperglycemia/Diabetes Medications:   Antihyperglycemics (From admission, onward)      Start     Stop Route Frequency Ordered    07/23/23 2100  insulin detemir U-100 (Levemir) pen 15 Units         -- SubQ 2 times daily 07/23/23 0910    07/23/23 1130  insulin aspart U-100 pen 10 Units         -- SubQ 3 times daily with meals 07/23/23 0908    07/22/23 1326  insulin aspart U-100 pen 1-10 Units         -- SubQ Before meals & nightly PRN 07/22/23 1226

## 2023-07-24 NOTE — ASSESSMENT & PLAN NOTE
History of MSSA infection of drive line.  Cultures obtained on this admission are positive for MSSA as well.      Plan  1. Change antibiotics to IV cefazolin.

## 2023-07-24 NOTE — PROGRESS NOTES
[facility] - Surgical Intensive Care  Heart Transplant  Progress Note    Patient Name: Kevan Queen  MRN: [id number]  Admission Date: 7/22/2023  Hospital Length of Stay: 2 days  Attending Physician: Josh Pulido MD  Primary Care Provider: ORALIA Cline  Principal Problem:Severe sepsis    Subjective:     Interval History: NAEON. No acute complaints. On 2L NC. Has L sided CP with deep inspiration.     Continuous Infusions:   heparin (porcine) in D5W 20 Units/kg/hr (07/24/23 1000)    milrinone 20mg/100ml D5W (200mcg/ml) 0.25 mcg/kg/min (07/24/23 1000)     Scheduled Meds:   busPIRone  10 mg Oral BID    ceFAZolin(ANCEF) in D5W 500 mL CONTINUOUS INFUSION  6 g Intravenous Q24H    dexAMETHasone  6 mg Oral Daily    [START ON 7/25/2023] furosemide  80 mg Oral Daily    insulin aspart U-100  10 Units Subcutaneous TIDWM    insulin detemir U-100  15 Units Subcutaneous BID    levETIRAcetam  1,000 mg Oral BID    mirtazapine  15 mg Oral Nightly    polyethylene glycol  17 g Oral Daily    remdesivir infusion  100 mg Intravenous Daily    senna-docusate 8.6-50 mg  2 tablet Oral BID    warfarin  4 mg Oral Daily     PRN Meds:acetaminophen, dextrose 10%, dextrose 10%, glucagon (human recombinant), glucose, glucose, HYDROcodone-acetaminophen, insulin aspart U-100    Review of patient's allergies indicates:   Allergen Reactions    Aspirin Other (See Comments)     Mr. Thacker is enrolled in Dr. Paige's Alon Trial and cannot have any aspirin and/or aspirin-containing products. DO NOT cancel any orders for INV Aspirin 100 mg/Placebo. If you have questions, please contact sIabel @ [phone], [phone],[email or web address], secure chat or MS Teams message.    Bumex [bumetanide] Hives    Lactose Diarrhea     Other reaction(s): Abdominal distension, gaseous    Torsemide Hives     Objective:     Vital Signs (Most Recent):  Temp: 98.3 °F (36.8 °C) (07/24/23 0800)  Pulse: 109 (07/24/23 1045)  Resp: 14 (07/24/23  1045)  BP: (!) 84/0 (07/24/23 0900)  SpO2: 95 % (07/24/23 0900) Vital Signs (24h Range):  Temp:  [97.8 °F (36.6 °C)-98.5 °F (36.9 °C)] 98.3 °F (36.8 °C)  Pulse:  [101-131] 109  Resp:  [12-66] 14  SpO2:  [93 %-97 %] 95 %  BP: (76-88)/(0) 84/0     Patient Vitals for the past 72 hrs (Last 3 readings):   Weight   07/24/23 0439 95.8 kg (211 lb 3.2 oz)   07/23/23 1006 98.7 kg (217 lb 9.5 oz)   07/22/23 0344 98.9 kg (218 lb 0.6 oz)       Body mass index is 26.4 kg/m².      Intake/Output Summary (Last 24 hours) at 7/24/2023 1103  Last data filed at 7/24/2023 1000  Gross per 24 hour   Intake 1498.62 ml   Output 2675 ml   Net -1176.38 ml          Physical Exam  Constitutional:       Appearance: Normal appearance.   HENT:      Head: Normocephalic and atraumatic.      Mouth/Throat:      Mouth: Mucous membranes are moist.   Cardiovascular:      Rate and Rhythm: Normal rate.      Comments: Normal VAD hum can be heard  Pulmonary:      Effort: Pulmonary effort is normal. No respiratory distress.      Breath sounds: Normal breath sounds. No wheezing or rales.   Abdominal:      General: Abdomen is flat.      Tenderness: There is no abdominal tenderness. There is no guarding.   Musculoskeletal:         General: No swelling.      Cervical back: Normal range of motion and neck supple.   Skin:     General: Skin is warm.      Capillary Refill: Capillary refill takes 2 to 3 seconds.      Comments: R sided PICC line in basilic vein   Neurological:      General: No focal deficit present.      Mental Status: He is alert and oriented to person, place, and time.   Psychiatric:         Mood and Affect: Mood normal.         Behavior: Behavior normal.          Significant Labs:  CBC:  Recent Labs   Lab 07/23/23  0323 07/23/23  0829 07/24/23  0322   WBC 33.14* 34.88* 32.77*   RBC 3.79* 4.00* 3.99*   HGB 10.2* 10.6* 10.7*   HCT 29.6* 31.4* 31.9*    406 460*   MCV 78* 79* 80*   MCH 26.9* 26.5* 26.8*   MCHC 34.5 33.8 33.5       BNP:  Recent  Labs   Lab 07/22/23  0248 07/24/23  0322   * 348*       CMP:  Recent Labs   Lab 07/22/23  0452 07/23/23  0323 07/24/23  0040 07/24/23  0322   * 285* 208* 174*   CALCIUM 8.8 9.3 9.3 9.4   ALBUMIN 2.2*  --   --  2.1*   PROT 7.3  --   --  7.4   * 128* 131* 133*   K 4.0 4.0 4.0 3.8   CO2 21* 29 28 30*   CL 93* 88* 89* 90*   BUN 21* 27* 34* 36*   CREATININE 1.4 1.3 1.4 1.5*   ALKPHOS 198*  --   --  158*   ALT 45*  --   --  32   AST 59*  --   --  47*   BILITOT 1.4*  --   --  1.6*        Coagulation:   Recent Labs   Lab 07/23/23  0323 07/23/23  0829 07/23/23  1413 07/23/23 2055 07/24/23  0322   INR 1.3* 1.2  --   --  1.2   APTT 29.9 31.1 31.4 30.2 32.8*       LDH:  Recent Labs   Lab 07/22/23  0140 07/23/23  0323 07/24/23  0322   * 430* 409*       Microbiology:  Microbiology Results (last 7 days)       Procedure Component Value Units Date/Time    Blood culture [[id number]]  (Abnormal) Collected: 07/22/23 1058    Order Status: Completed Specimen: Blood from Peripheral, Hand, Right Updated: 07/24/23 1016     Blood Culture, Routine Gram stain aer bottle: Gram positive cocci in clusters resembling Staph      Results called to and read back by:Zaheer Rae RN 07/23/2023  06:41      STAPHYLOCOCCUS AUREUS  Susceptibility pending  [place] [facility] [facility].      Blood culture [[id number]] Collected: 07/23/23 1305    Order Status: Completed Specimen: Blood from Line, Jugular, Internal Right Updated: 07/24/23 0715     Blood Culture, Routine No Growth to date    Aerobic culture [[id number]]  (Abnormal) Collected: 07/22/23 0159    Order Status: Completed Specimen: Abscess from Abdomen Updated: 07/23/23 0850     Aerobic Bacterial Culture STAPHYLOCOCCUS AUREUS  Few  Susceptibility pending      MRSA/SA Rapid ID by PCR from Blood culture [[id number]]  (Abnormal) Collected: 07/22/23 1058    Order Status: Completed Updated: 07/23/23 0557     Staph aureus ID by PCR Positive      Methicillin Resistant ID by PCR Negative    Culture, Anaerobe [[id number]] Collected: 07/22/23 0159    Order Status: Completed Specimen: Abscess from Abdomen Updated: 07/23/23 0551     Anaerobic Culture Culture in progress    Blood culture [[id number]]     Order Status: Sent Specimen: Blood             I have reviewed all pertinent labs within the past 24 hours.    Estimated Creatinine Clearance: 79.8 mL/min (A) (based on SCr of 1.5 mg/dL (H)).    Diagnostic Results:  I have reviewed and interpreted all pertinent imaging results/findings within the past 24 hours.    Assessment and Plan:     Patient is a 36 yo black male with stage d HFrEF, NICM, ? 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 He underwent RVAD removal and chest closure 6/30/2022.  He was weaned off  but he had to restarted due to RVF. He was eventually transitioned to milrinone (secondary to  shortage) now on 0.25 mcg/kg/min.  Patient also has history of driveline infection.  Patient presented to the emergency room today because of pleuritic chest pain that has been going on for the last 3 days however it was more intense today.  Also complains of having shortness of breath and fevers associated with it.  He was spiking fever of 102 in the emergency room with elevated white blood cell count up to 17,000. He was treated with Zosyn.  He was sent here for admission.  Patient was admitted a month back for COVID infection for which he received remdesivir for 3 days.  He is on doxycycline for chronic driveline infection.  He is on Milrinone for RV failure.  Patient denies having any low flow alarms on the pump.  Also denies having any lower extremity edema, increased discharge from his driveline site.  Patient has ground-glass opacities on imaging from outside hospital      * Severe  sepsis  Patient presents with elevated fevers, white blood cell count, elevated lactic acid.    ID following. Possible Secondary to COVID-19 infection.    Continue:    1. Remdesivir for 5 days.   2. Dexamethasone for 10 days.  History of MSSA infection of drive line.  Cultures obtained on this admission are positive for MSSA as well.    Changing to Cefazolin 6 grams IV q 24 hours by continuous infusion.      LVAD (left ventricular assist device) present  -HeartMate 3 Implanted on 6/29/2022 as DT with RV failure on milrinone at 0.25 mcg/kg/min  -Continue Coumadin, Goal INR 2.0-3.0. Sub Therapeutic today. Con Heparin.   -LDH is stable.  Will continue to monitor daily.  -Speed set at 5100, LSL 4700 rpm      Procedure: Device Interrogation Including analysis of device parameters  Current Settings: Ventricular Assist Device  Review of device function is stable/unstable stable    TXP LVAD INTERROGATIONS 7/24/2023 7/24/2023 7/24/2023 7/24/2023 7/24/2023 7/24/2023 7/24/2023   Type HeartMate3 HeartMate3 HeartMate3 HeartMate3 HeartMate3 HeartMate3 HeartMate3   Flow 4.3 4.5 4.5 4.4 4.3 4.6 4.4   Speed 5100 5100 5100 5100 5100 5100 5100   PI 4.5 3.7 4.6 4.7 4.8 3.1 3.9   Power (Serna) 3.7 3.7 3.7 3.7 3.7 3.6 3.7   LSL 4700 4700 4700 4700 4700 4700 4700   Low Flow Alarm - - - - - - -   High Power Alarm - - - - - - -   Pulsatility Intermittent pulse Intermittent pulse Intermittent pulse Intermittent pulse Intermittent pulse Intermittent pulse Intermittent pulse       Seizure-like activity  On on Keppra    Type 2 diabetes mellitus with hyperglycemia  Will keep him on sliding scale and a.c. HS    Chronic combined systolic and diastolic heart failure  Status post LVAD.  On GDMT with valsartan and Aldactone      Fausto Reyes NP  Heart Transplant  [facility] - [facility]

## 2023-07-24 NOTE — PT/OT/SLP EVAL
"Physical Therapy Evaluation and Discharge Note     Patient Name:  Kevan Queen  MRN: [id number]    Admit Date: 7/22/2023  Admitting Diagnosis:  Severe sepsis  Length of Stay: 2 days  Recent Surgery: * No surgery found *      Recommendations:     Discharge Recommendations: Home, no PT needs anticipated  Equipment recommendations: none  Barriers to discharge: None Identified     Assessment:     Kevan Queen is a 38 y.o. male admitted to [facility] on 7/22/2023 with medical diagnosis of Severe sepsis. Pt presents with impaired cardiopulmonary response to activity. These deficits effect their roles and responsibilities in which they were able to complete prior to admit.     Pt is agreeable to therapy evaluation and session. PTA, pt states (I) with ambulation and ADLs. Pt verbalizes LVAD management and states he is proficient at switching from wall power <> battery. No consolidation bag with pt d/t emergent admission to hospital. Pt inquires about ambulating in hallway but discussion with pt d/t COVID+ status that he is unable to at this time. Encouraged pt to ambulate with RN and sit up recliner during admission. Pt demonstrated (I) with bed and OOB mobility during session.    Kevan Queen does not require acute PT services at this time due to being at (I) PLOF and demonstrating safety with functional mobility, including transfers and ambulation    Once medically stable, recommending pt discharge to Home, no PT needs anticipated.      D/C from acute PT services.    Plan:     Plan of Care reviewed with: patient    This plan of care has been discussed with the patient/caregiver, who was included in its development and is in agreement with the identified goals and treatment plan.     Subjective     Communicated with RN prior to session.  Patient found supine upon PT entry to room, agreeable to evaluation. Pt alone during session.    Chief Complaint: none stated    Patient/Family Comments/goals: "I know how to handle this " "stuff"    Pain/Comfort:  Pain Rating 1: 0/10  Pain Rating Post-Intervention 1: 0/10    Patients cultural, spiritual, Roman Catholic conflicts given the current situation: None identified     Patient History: information obtained from pt     Living Environment: Pt lives with wifeCHANTELL, 2 children (3 & 6 yo) in single level home  with 0 MILY. Bathroom set-up: pt states he has been (I) spongebathing  Prior Level of Function: independent with mobility and ADLs  DME owned: none  Support Available/Caregiver Assistance:  states wife can (A) when he needs it    Objective:     Patient found with: arterial line, blood pressure cuff, central line, LVAD, oxygen, peripheral IV, pulse ox (continuous), telemetry    Recent Surgery: * No surgery found *    General Precautions: Standard, fall, LVAD, contact, droplet, airborne   Orthopedic Precautions:N/A   Braces: N/A   Oxygen Device: nasal cannula      Exams:    Cognition:  Alert  Command following: Follows multistep verbal commands  Communication: clear/fluent    Sensation:   Light touch sensation: Intact BLEs    Gross Motor Coordination: No deficits noted during functional mobility tasks     Edema/Skin Integrity: None noted; Visible skin intact    Postural examination/scapula alignment:  no deficits noted    Lower Extremity Range of Motion:  Right Lower Extremity: WFL  Left Lower Extremity: WFL    Lower Extremity Strength:  Right Lower Extremity: WFL  Left Lower Extremity: WFL    Functional Mobility:    Bed Mobility:  Supine > Sit with independence  Sit > Supine with independence    Transfers:   Sit <> Stand Transfer: Independent x multiple trials trials from eob with no AD   Bed <> Chair: pt declined; states he was "lounging in bed before therapy interrupted"             Gait:  Distance: ~25 ft in room d/t COVID+  Assistance level: Independent  Assistive Device: none  Gait Assessment: no deficits noted    Balance:  Dynamic Sitting: NORMAL: No deviations seen in posture held " dynamically  Standing:  Static: NORMAL: No deviations seen in posture held statically   Dynamic: NORMAL: No deviations seen in posture held dynamically    Outcome Measure: AM-PAC 6 CLICK MOBILITY  Total Score:24     Patient/Caregiver Education:   OT present for coeval due to pt's multiple medical comorbidities and functional/cognition deficits requiring two skilled therapists to appropriately progress pt's musculoskeletal strength, neuromuscular control, and endurance while taking into consideration medical acuity and pt safety.    Therapist educated pt/caregiver regarding:   PT POC and goals for therapy   Safety with mobility and fall risk   Instruction on use of call button and importance of calling nursing staff for assistance with mobility   Time provided for therapeutic counseling and discussion of current health disposition. All questions answered to satisfaction, within scope of PT practice     Patient/caregiver able to verbalize understanding and expressed no further questions this visit; will follow-up with pt/caregiver during current admit for additional questions/concerns within scope of practice.     White board updated.     Patient left supine with all lines intact, call button in reach, and RN present.    GOALS:   Multidisciplinary Problems       Physical Therapy Goals       Not on file              Multidisciplinary Problems (Resolved)          Problem: Physical Therapy    Goal Priority Disciplines Outcome Goal Variances Interventions   Physical Therapy Goal   (Resolved)     PT, PT/OT Met     Description: Patient does not require acute PT services at this time due to being at (I) PLOF and demonstrating safety with functional mobility, including transfers and ambulation                           History:     Past Medical History:   Diagnosis Date    Arthritis     Awareness alteration, transient 9/1/2022    Cardiomyopathy     CHF (congestive heart failure) 10/01/2020    COVID-19 6/3/2023    Diabetes  mellitus     Dilated cardiomyopathy 10/26/2020    Drug abuse 10/2020    Headache 4/19/2023    Hyperglycemia 12/16/2022    Hyperosmolar hyperglycemic state (HHS) 5/25/2022    ICD (implantable cardioverter-defibrillator) in place 10/26/2020    Left ventricular assist device (LVAD) complication, initial encounter 6/5/2023    Muscle cramping 6/15/2022    Renal disorder     SOB (shortness of breath) 6/13/2022    Tingling in extremities 7/13/2022       Past Surgical History:   Procedure Laterality Date    APPLICATION OF WOUND VACUUM-ASSISTED CLOSURE DEVICE N/A 6/30/2022    Procedure: APPLICATION, WOUND VAC;  Surgeon: Luis F Paige MD;  Location: [facility] OR 2ND FLR;  Service: Cardiovascular;  Laterality: N/A;  50 x 5 cm    CARDIAC DEFIBRILLATOR PLACEMENT      IMPLANTATION OF RIGHT VENTRICULAR ASSIST DEVICE (RVAD) N/A 6/29/2022    Procedure: INSERTION, RVAD;  Surgeon: Luis F Paige MD;  [facility];  Service: Cardiovascular;  Laterality: N/A;    INSERTION OF GRAFT TO PERICARDIUM Right 6/30/2022    Procedure: INSERTION-RIGHT VENTRICULAR ASSIST DEVICE;  Surgeon: Luis F Paige MD;  Location: [facility] OR [facility] FLR;  Service: Cardiovascular;  Laterality: Right;    IRRIGATION OF MEDIASTINUM  6/30/2022    Procedure: IRRIGATION, MEDIASTINUM;  Surgeon: Luis F Paige MD;  Location: [facility] OR 2ND FLR;  Service: Cardiovascular;;    LEFT VENTRICULAR ASSIST DEVICE Left 6/23/2022    Procedure: INSERTION-LEFT VENTRICULAR ASSIST DEVICE;  Surgeon: Luis F Paige MD;  Location: [facility] OR [facility] FLR;  Service: Cardiovascular;  Laterality: Left;    LEFT VENTRICULAR ASSIST DEVICE N/A 6/29/2022    Procedure: INSERTION-LEFT VENTRICULAR ASSIST DEVICE;  Surgeon: Luis F Paige MD;  Location: [facility] OR 2ND FLR;  Service: Cardiovascular;  Laterality: N/A;    RIGHT HEART CATHETERIZATION Right 4/8/2022    Procedure: INSERTION, CATHETER, RIGHT HEART;  Surgeon: Luca Lopez Jr., MD;  Location: [facility] CATH LAB;  Service: Cardiology;  Laterality:  Right;    RIGHT HEART CATHETERIZATION Right 4/19/2022    Procedure: INSERTION, CATHETER, RIGHT HEART;  Surgeon: Josh Pulido MD;  Location: [facility] CATH LAB;  Service: Cardiology;  Laterality: Right;    RIGHT HEART CATHETERIZATION Right 7/21/2022    Procedure: INSERTION, CATHETER, RIGHT HEART;  Surgeon: Dalia Crum MD;  Location: [facility] CATH LAB;  Service: Cardiology;  Laterality: Right;    RIGHT HEART CATHETERIZATION Right 10/31/2022    Procedure: INSERTION, CATHETER, RIGHT HEART;  Surgeon: Dalia Crum MD;  Location: [facility] CATH LAB;  Service: Cardiology;  Laterality: Right;    STERNAL WOUND CLOSURE N/A 6/30/2022    Procedure: CLOSURE, WOUND, STERNUM;  Surgeon: Luis F Paige MD;  Location: [facility] OR [address] [place] [address];  Service: Cardiovascular;  Laterality: N/A;       Time Tracking:     PT Received On: 07/24/23  PT Start Time: 1303     PT Stop Time: 1317  PT Total Time (min): 14 min     Billable Minutes: Evaluation 14    07/24/2023

## 2023-07-24 NOTE — ASSESSMENT & PLAN NOTE
38 year old male with a history of MSSA infection of his drive line exit site.  He is admitted with sepsis.  Blood cultures are now positive for MSSA.    Plan    1. Discontinue vancomycin and discontinue zosyn.    2. Start cefazolin 6 grams IV q 24 hours by continuous infusion.

## 2023-07-24 NOTE — ASSESSMENT & PLAN NOTE
BG goal 140-180    Continue Levemir 15 units  BID  Novolog 10 units TID with meals (basal/prandial match)   Continue Moderate Dose Correction Scale  BG monitoring ac/hs    ** Please call Endocrine for any BG related issues **    Discharge plans: TBD    Lab Results   Component Value Date    HGBA1C 6.7 (H) 07/22/2023

## 2023-07-24 NOTE — PLAN OF CARE
PT evaluation complete - see note for details.     Problem: Physical Therapy  Goal: Physical Therapy Goal  Description: Patient does not require acute PT services at this time due to being at (I) PLOF and demonstrating safety with functional mobility, including transfers and ambulation    Outcome: Met     7/24/2023

## 2023-07-25 LAB
ANION GAP SERPL CALC-SCNC: 13 MMOL/L (ref 8–16)
ANISOCYTOSIS BLD QL SMEAR: SLIGHT
ANISOCYTOSIS BLD QL SMEAR: SLIGHT
APTT PPP: 38.4 SEC (ref 21–32)
APTT PPP: 38.4 SEC (ref 21–32)
APTT PPP: 46.4 SEC (ref 21–32)
APTT PPP: 47.4 SEC (ref 21–32)
BACTERIA BLD CULT: ABNORMAL
BACTERIA SPEC ANAEROBE CULT: NORMAL
BASOPHILS # BLD AUTO: 0.03 K/UL (ref 0–0.2)
BASOPHILS # BLD AUTO: 0.03 K/UL (ref 0–0.2)
BASOPHILS NFR BLD: 0.1 % (ref 0–1.9)
BASOPHILS NFR BLD: 0.1 % (ref 0–1.9)
BUN SERPL-MCNC: 32 MG/DL (ref 6–20)
BURR CELLS BLD QL SMEAR: ABNORMAL
BURR CELLS BLD QL SMEAR: ABNORMAL
CALCIUM SERPL-MCNC: 9.2 MG/DL (ref 8.7–10.5)
CHLORIDE SERPL-SCNC: 91 MMOL/L (ref 95–110)
CO2 SERPL-SCNC: 28 MMOL/L (ref 23–29)
CREAT SERPL-MCNC: 1.3 MG/DL (ref 0.5–1.4)
DIFFERENTIAL METHOD: ABNORMAL
DIFFERENTIAL METHOD: ABNORMAL
EOSINOPHIL # BLD AUTO: 0 K/UL (ref 0–0.5)
EOSINOPHIL # BLD AUTO: 0 K/UL (ref 0–0.5)
EOSINOPHIL NFR BLD: 0 % (ref 0–8)
EOSINOPHIL NFR BLD: 0 % (ref 0–8)
ERYTHROCYTE [DISTWIDTH] IN BLOOD BY AUTOMATED COUNT: 21.6 % (ref 11.5–14.5)
ERYTHROCYTE [DISTWIDTH] IN BLOOD BY AUTOMATED COUNT: 21.6 % (ref 11.5–14.5)
EST. GFR  (NO RACE VARIABLE): >60 ML/MIN/1.73 M^2
GLUCOSE SERPL-MCNC: 220 MG/DL (ref 70–110)
HCT VFR BLD AUTO: 31.4 % (ref 40–54)
HCT VFR BLD AUTO: 31.4 % (ref 40–54)
HGB BLD-MCNC: 10.3 G/DL (ref 14–18)
HGB BLD-MCNC: 10.3 G/DL (ref 14–18)
HYPOCHROMIA BLD QL SMEAR: ABNORMAL
HYPOCHROMIA BLD QL SMEAR: ABNORMAL
IMM GRANULOCYTES # BLD AUTO: 0.47 K/UL (ref 0–0.04)
IMM GRANULOCYTES # BLD AUTO: 0.47 K/UL (ref 0–0.04)
IMM GRANULOCYTES NFR BLD AUTO: 1.6 % (ref 0–0.5)
IMM GRANULOCYTES NFR BLD AUTO: 1.6 % (ref 0–0.5)
INR PPP: 1.3 (ref 0.8–1.2)
LDH SERPL L TO P-CCNC: 486 U/L (ref 110–260)
LYMPHOCYTES # BLD AUTO: 1.7 K/UL (ref 1–4.8)
LYMPHOCYTES # BLD AUTO: 1.7 K/UL (ref 1–4.8)
LYMPHOCYTES NFR BLD: 5.6 % (ref 18–48)
LYMPHOCYTES NFR BLD: 5.6 % (ref 18–48)
MAGNESIUM SERPL-MCNC: 2.2 MG/DL (ref 1.6–2.6)
MCH RBC QN AUTO: 26.3 PG (ref 27–31)
MCH RBC QN AUTO: 26.3 PG (ref 27–31)
MCHC RBC AUTO-ENTMCNC: 32.8 G/DL (ref 32–36)
MCHC RBC AUTO-ENTMCNC: 32.8 G/DL (ref 32–36)
MCV RBC AUTO: 80 FL (ref 82–98)
MCV RBC AUTO: 80 FL (ref 82–98)
MONOCYTES # BLD AUTO: 2 K/UL (ref 0.3–1)
MONOCYTES # BLD AUTO: 2 K/UL (ref 0.3–1)
MONOCYTES NFR BLD: 6.6 % (ref 4–15)
MONOCYTES NFR BLD: 6.6 % (ref 4–15)
NEUTROPHILS # BLD AUTO: 25.7 K/UL (ref 1.8–7.7)
NEUTROPHILS # BLD AUTO: 25.7 K/UL (ref 1.8–7.7)
NEUTROPHILS NFR BLD: 86.1 % (ref 38–73)
NEUTROPHILS NFR BLD: 86.1 % (ref 38–73)
NRBC BLD-RTO: 0 /100 WBC
NRBC BLD-RTO: 0 /100 WBC
OVALOCYTES BLD QL SMEAR: ABNORMAL
OVALOCYTES BLD QL SMEAR: ABNORMAL
PHOSPHATE SERPL-MCNC: 3.3 MG/DL (ref 2.7–4.5)
PLATELET # BLD AUTO: 506 K/UL (ref 150–450)
PLATELET # BLD AUTO: 506 K/UL (ref 150–450)
PLATELET BLD QL SMEAR: ABNORMAL
PLATELET BLD QL SMEAR: ABNORMAL
PMV BLD AUTO: 10 FL (ref 9.2–12.9)
PMV BLD AUTO: 10 FL (ref 9.2–12.9)
POCT GLUCOSE: 203 MG/DL (ref 70–110)
POCT GLUCOSE: 225 MG/DL (ref 70–110)
POCT GLUCOSE: 237 MG/DL (ref 70–110)
POCT GLUCOSE: 297 MG/DL (ref 70–110)
POCT GLUCOSE: 311 MG/DL (ref 70–110)
POIKILOCYTOSIS BLD QL SMEAR: SLIGHT
POIKILOCYTOSIS BLD QL SMEAR: SLIGHT
POLYCHROMASIA BLD QL SMEAR: ABNORMAL
POLYCHROMASIA BLD QL SMEAR: ABNORMAL
POTASSIUM SERPL-SCNC: 4.2 MMOL/L (ref 3.5–5.1)
PROTHROMBIN TIME: 14.1 SEC (ref 9–12.5)
RBC # BLD AUTO: 3.92 M/UL (ref 4.6–6.2)
RBC # BLD AUTO: 3.92 M/UL (ref 4.6–6.2)
SODIUM SERPL-SCNC: 132 MMOL/L (ref 136–145)
WBC # BLD AUTO: 29.77 K/UL (ref 3.9–12.7)
WBC # BLD AUTO: 29.77 K/UL (ref 3.9–12.7)

## 2023-07-25 PROCEDURE — 87186 SC STD MICRODIL/AGAR DIL: CPT | Performed by: INTERNAL MEDICINE

## 2023-07-25 PROCEDURE — 85730 THROMBOPLASTIN TIME PARTIAL: CPT | Performed by: STUDENT IN AN ORGANIZED HEALTH CARE EDUCATION/TRAINING PROGRAM

## 2023-07-25 PROCEDURE — 93010 EKG 12-LEAD: ICD-10-PCS | Mod: ,,, | Performed by: INTERNAL MEDICINE

## 2023-07-25 PROCEDURE — 99232 PR SUBSEQUENT HOSPITAL CARE,LEVL II: ICD-10-PCS | Mod: ,,, | Performed by: NURSE PRACTITIONER

## 2023-07-25 PROCEDURE — 87040 BLOOD CULTURE FOR BACTERIA: CPT | Performed by: INTERNAL MEDICINE

## 2023-07-25 PROCEDURE — 85610 PROTHROMBIN TIME: CPT | Performed by: HOSPITALIST

## 2023-07-25 PROCEDURE — 63600175 PHARM REV CODE 636 W HCPCS: Mod: JZ,TB | Performed by: STUDENT IN AN ORGANIZED HEALTH CARE EDUCATION/TRAINING PROGRAM

## 2023-07-25 PROCEDURE — 85025 COMPLETE CBC W/AUTO DIFF WBC: CPT | Performed by: STUDENT IN AN ORGANIZED HEALTH CARE EDUCATION/TRAINING PROGRAM

## 2023-07-25 PROCEDURE — 25000003 PHARM REV CODE 250: Performed by: NURSE PRACTITIONER

## 2023-07-25 PROCEDURE — 99233 PR SUBSEQUENT HOSPITAL CARE,LEVL III: ICD-10-PCS | Mod: ,,, | Performed by: INTERNAL MEDICINE

## 2023-07-25 PROCEDURE — 94761 N-INVAS EAR/PLS OXIMETRY MLT: CPT

## 2023-07-25 PROCEDURE — 27000248 HC VAD-ADDITIONAL DAY

## 2023-07-25 PROCEDURE — 85730 THROMBOPLASTIN TIME PARTIAL: CPT | Mod: 91 | Performed by: INTERNAL MEDICINE

## 2023-07-25 PROCEDURE — 87150 DNA/RNA AMPLIFIED PROBE: CPT | Mod: 59 | Performed by: INTERNAL MEDICINE

## 2023-07-25 PROCEDURE — 84100 ASSAY OF PHOSPHORUS: CPT | Performed by: HOSPITALIST

## 2023-07-25 PROCEDURE — 63600175 PHARM REV CODE 636 W HCPCS: Performed by: INTERNAL MEDICINE

## 2023-07-25 PROCEDURE — 25000003 PHARM REV CODE 250: Performed by: STUDENT IN AN ORGANIZED HEALTH CARE EDUCATION/TRAINING PROGRAM

## 2023-07-25 PROCEDURE — 99233 PR SUBSEQUENT HOSPITAL CARE,LEVL III: ICD-10-PCS | Mod: ,,, | Performed by: NURSE PRACTITIONER

## 2023-07-25 PROCEDURE — 87077 CULTURE AEROBIC IDENTIFY: CPT | Performed by: INTERNAL MEDICINE

## 2023-07-25 PROCEDURE — 20600001 HC STEP DOWN PRIVATE ROOM

## 2023-07-25 PROCEDURE — 93010 ELECTROCARDIOGRAM REPORT: CPT | Mod: ,,, | Performed by: INTERNAL MEDICINE

## 2023-07-25 PROCEDURE — 80048 BASIC METABOLIC PNL TOTAL CA: CPT | Performed by: HOSPITALIST

## 2023-07-25 PROCEDURE — 99232 SBSQ HOSP IP/OBS MODERATE 35: CPT | Mod: ,,, | Performed by: NURSE PRACTITIONER

## 2023-07-25 PROCEDURE — 99233 SBSQ HOSP IP/OBS HIGH 50: CPT | Mod: ,,, | Performed by: INTERNAL MEDICINE

## 2023-07-25 PROCEDURE — 27000207 HC ISOLATION

## 2023-07-25 PROCEDURE — 93005 ELECTROCARDIOGRAM TRACING: CPT

## 2023-07-25 PROCEDURE — 99233 SBSQ HOSP IP/OBS HIGH 50: CPT | Mod: ,,, | Performed by: NURSE PRACTITIONER

## 2023-07-25 PROCEDURE — 63600175 PHARM REV CODE 636 W HCPCS: Performed by: HOSPITALIST

## 2023-07-25 PROCEDURE — 83615 LACTATE (LD) (LDH) ENZYME: CPT | Performed by: HOSPITALIST

## 2023-07-25 PROCEDURE — 36415 COLL VENOUS BLD VENIPUNCTURE: CPT | Performed by: INTERNAL MEDICINE

## 2023-07-25 PROCEDURE — 83735 ASSAY OF MAGNESIUM: CPT | Performed by: HOSPITALIST

## 2023-07-25 PROCEDURE — 25000003 PHARM REV CODE 250: Performed by: HOSPITALIST

## 2023-07-25 PROCEDURE — 25000003 PHARM REV CODE 250: Performed by: INTERNAL MEDICINE

## 2023-07-25 RX ORDER — INSULIN ASPART 100 [IU]/ML
12 INJECTION, SOLUTION INTRAVENOUS; SUBCUTANEOUS
Status: DISCONTINUED | OUTPATIENT
Start: 2023-07-25 | End: 2023-07-26

## 2023-07-25 RX ADMIN — ASPIRIN 81 MG: 81 TABLET, COATED ORAL at 08:07

## 2023-07-25 RX ADMIN — WARFARIN SODIUM 4 MG: 4 TABLET ORAL at 04:07

## 2023-07-25 RX ADMIN — CEFAZOLIN 6 G: 2 INJECTION, POWDER, FOR SOLUTION INTRAMUSCULAR; INTRAVENOUS at 12:07

## 2023-07-25 RX ADMIN — INSULIN ASPART 8 UNITS: 100 INJECTION, SOLUTION INTRAVENOUS; SUBCUTANEOUS at 04:07

## 2023-07-25 RX ADMIN — LEVETIRACETAM 1000 MG: 500 TABLET, FILM COATED ORAL at 08:07

## 2023-07-25 RX ADMIN — HEPARIN SODIUM 22 UNITS/KG/HR: 10000 INJECTION, SOLUTION INTRAVENOUS at 08:07

## 2023-07-25 RX ADMIN — MILRINONE LACTATE 0.25 MCG/KG/MIN: 0.2 INJECTION, SOLUTION INTRAVENOUS at 08:07

## 2023-07-25 RX ADMIN — SENNOSIDES AND DOCUSATE SODIUM 2 TABLET: 50; 8.6 TABLET ORAL at 08:07

## 2023-07-25 RX ADMIN — HYDROCODONE BITARTRATE AND ACETAMINOPHEN 1 TABLET: 5; 325 TABLET ORAL at 08:07

## 2023-07-25 RX ADMIN — MILRINONE LACTATE 0.25 MCG/KG/MIN: 0.2 INJECTION, SOLUTION INTRAVENOUS at 04:07

## 2023-07-25 RX ADMIN — INSULIN ASPART 10 UNITS: 100 INJECTION, SOLUTION INTRAVENOUS; SUBCUTANEOUS at 08:07

## 2023-07-25 RX ADMIN — FUROSEMIDE 80 MG: 80 TABLET ORAL at 08:07

## 2023-07-25 RX ADMIN — MIRTAZAPINE 15 MG: 15 TABLET, FILM COATED ORAL at 08:07

## 2023-07-25 RX ADMIN — HEPARIN SODIUM 22 UNITS/KG/HR: 10000 INJECTION, SOLUTION INTRAVENOUS at 10:07

## 2023-07-25 RX ADMIN — INSULIN ASPART 4 UNITS: 100 INJECTION, SOLUTION INTRAVENOUS; SUBCUTANEOUS at 12:07

## 2023-07-25 RX ADMIN — INSULIN ASPART 4 UNITS: 100 INJECTION, SOLUTION INTRAVENOUS; SUBCUTANEOUS at 08:07

## 2023-07-25 RX ADMIN — INSULIN ASPART 12 UNITS: 100 INJECTION, SOLUTION INTRAVENOUS; SUBCUTANEOUS at 04:07

## 2023-07-25 RX ADMIN — POLYETHYLENE GLYCOL 3350 17 G: 17 POWDER, FOR SOLUTION ORAL at 08:07

## 2023-07-25 RX ADMIN — DEXAMETHASONE 6 MG: 4 TABLET ORAL at 08:07

## 2023-07-25 RX ADMIN — REMDESIVIR 100 MG: 100 INJECTION, POWDER, LYOPHILIZED, FOR SOLUTION INTRAVENOUS at 08:07

## 2023-07-25 RX ADMIN — INSULIN ASPART 3 UNITS: 100 INJECTION, SOLUTION INTRAVENOUS; SUBCUTANEOUS at 08:07

## 2023-07-25 RX ADMIN — INSULIN ASPART 10 UNITS: 100 INJECTION, SOLUTION INTRAVENOUS; SUBCUTANEOUS at 12:07

## 2023-07-25 NOTE — PLAN OF CARE
Pt AO4. Pt independent. Pt denies any pain/ distress at this time.  LVAD assessment completed with no abnormalities noted at this time. Pt has Doppler of 78. LVAD dressing CDI. Pt remain in isolations with staff interactions. IS at bedside, pt demonstrated proper usage. Call light and belongings within pt reach. Pt encouraged to contact staff. Will continue plan of care.     Problem: Diabetes Comorbidity  Goal: Blood Glucose Level Within Targeted Range  Outcome: Ongoing, Progressing     Problem: Adult Inpatient Plan of Care  Goal: Plan of Care Review  Outcome: Ongoing, Progressing  Goal: Patient-Specific Goal (Individualized)  Outcome: Ongoing, Progressing  Goal: Absence of Hospital-Acquired Illness or Injury  Outcome: Ongoing, Progressing  Goal: Optimal Comfort and Wellbeing  Outcome: Ongoing, Progressing  Goal: Readiness for Transition of Care  Outcome: Ongoing, Progressing     Problem: Diabetes Comorbidity  Goal: Blood Glucose Level Within Targeted Range  Outcome: Ongoing, Progressing     Problem: Infection  Goal: Absence of Infection Signs and Symptoms  Outcome: Ongoing, Progressing     Problem: Adjustment to Illness (Sepsis/Septic Shock)  Goal: Optimal Coping  Outcome: Ongoing, Progressing     Problem: Bleeding (Sepsis/Septic Shock)  Goal: Absence of Bleeding  Outcome: Ongoing, Progressing     Problem: Glycemic Control Impaired (Sepsis/Septic Shock)  Goal: Blood Glucose Level Within Desired Range  Outcome: Ongoing, Progressing     Problem: Infection Progression (Sepsis/Septic Shock)  Goal: Absence of Infection Signs and Symptoms  Outcome: Ongoing, Progressing     Problem: Nutrition Impaired (Sepsis/Septic Shock)  Goal: Optimal Nutrition Intake  Outcome: Ongoing, Progressing     Problem: Fluid Imbalance (Pneumonia)  Goal: Fluid Balance  Outcome: Ongoing, Progressing     Problem: Infection (Pneumonia)  Goal: Resolution of Infection Signs and Symptoms  Outcome: Ongoing, Progressing     Problem: Respiratory  Compromise (Pneumonia)  Goal: Effective Oxygenation and Ventilation  Outcome: Ongoing, Progressing     Problem: Fall Injury Risk  Goal: Absence of Fall and Fall-Related Injury  Outcome: Ongoing, Progressing     Problem: Hemodynamic Instability (Ventricular Assist Device)  Goal: Optimal Blood Flow  Outcome: Ongoing, Progressing     Problem: Infection (Ventricular Assist Device)  Goal: Absence of Infection Signs and Symptoms  Outcome: Ongoing, Progressing     Problem: Right-Sided Heart Compromise (Ventricular Assist Device)  Goal: Effective Right-Sided Heart Function  Outcome: Ongoing, Progressing

## 2023-07-25 NOTE — SUBJECTIVE & OBJECTIVE
Interval History: No adverse events.    Review of Systems   Constitutional:  Positive for fatigue.   Respiratory:  Positive for shortness of breath.    Cardiovascular:  Positive for chest pain.   All other systems reviewed and are negative.  Objective:     Vital Signs (Most Recent):  Temp: 97.6 °F (36.4 °C) (07/24/23 1614)  Pulse: 104 (07/24/23 1827)  Resp: 15 (07/24/23 1759)  BP: (!) 82/0 (07/24/23 1614)  SpO2: 99 % (07/24/23 1614) Vital Signs (24h Range):  Temp:  [97.6 °F (36.4 °C)-98.5 °F (36.9 °C)] 97.6 °F (36.4 °C)  Pulse:  [101-127] 104  Resp:  [12-44] 15  SpO2:  [94 %-99 %] 99 %  BP: (78-88)/(0) 82/0     Weight: 95.8 kg (211 lb 3.2 oz)  Body mass index is 26.4 kg/m².    Estimated Creatinine Clearance: 79.8 mL/min (A) (based on SCr of 1.5 mg/dL (H)).     Physical Exam  Vitals and nursing note reviewed.   Constitutional:       Appearance: He is ill-appearing.   Eyes:      General: No scleral icterus.        Right eye: No discharge.         Left eye: No discharge.   Pulmonary:      Effort: No respiratory distress.   Abdominal:      General: There is no distension.      Comments: Dressings covering LVAD drive line exit site.   Lymphadenopathy:      Cervical: No cervical adenopathy.   Neurological:      General: No focal deficit present.      Mental Status: He is alert and oriented to person, place, and time.        Significant Labs:   Microbiology Results (last 7 days)       Procedure Component Value Units Date/Time    Blood culture [[id number]] Collected: 07/23/23 1305    Order Status: Completed Specimen: Blood from Line, Jugular, Internal Right Updated: 07/24/23 1606     Blood Culture, Routine Gram stain aer bottle: Gram positive cocci in clusters resembling Staph      Results called to and read back by:Cong Urena RN 07/24/2023  16:05    Aerobic culture [[id number]]  (Abnormal)  (Susceptibility) Collected: 07/22/23 0159    Order Status: Completed Specimen: Abscess from Abdomen Updated: 07/24/23 1113      Aerobic Bacterial Culture STAPHYLOCOCCUS AUREUS  Few      Blood culture [[id number]]  (Abnormal) Collected: 07/22/23 1058    Order Status: Completed Specimen: Blood from Peripheral, Hand, Right Updated: 07/24/23 1016     Blood Culture, Routine Gram stain aer bottle: Gram positive cocci in clusters resembling Staph      Results called to and read back by:Zaheer Rae RN 07/23/2023  06:41      STAPHYLOCOCCUS AUREUS  Susceptibility pending  ID consult required at [facility],[place] and [facility] locations.      MRSA/SA Rapid ID by PCR from Blood culture [[id number]]  (Abnormal) Collected: 07/22/23 1058    Order Status: Completed Updated: 07/23/23 0557     Staph aureus ID by PCR Positive     Methicillin Resistant ID by PCR Negative    Culture, Anaerobe [[id number]] Collected: 07/22/23 0159    Order Status: Completed Specimen: Abscess from Abdomen Updated: 07/23/23 0551     Anaerobic Culture Culture in progress    Blood culture [[id number]]     Order Status: Sent Specimen: Blood             Significant Imaging: I have reviewed all pertinent imaging results/findings within the past 24 hours.

## 2023-07-25 NOTE — PLAN OF CARE
Patient cooperative and pleasant with routine care and procedures.  Fall precautions reviewed and patient verbalized understanding.  Resting quietly and without complaints.  Will continue to monitor.

## 2023-07-25 NOTE — ASSESSMENT & PLAN NOTE
-HeartMate 3 Implanted on 6/29/2022 as DT with RV failure on milrinone at 0.25 mcg/kg/min  -Continue Coumadin, Goal INR 2.0-3.0. Sub Therapeutic today. Con Heparin.   -LDH is stable.  Will continue to monitor daily.  -Speed set at 5100, LSL 4700 rpm      Procedure: Device Interrogation Including analysis of device parameters  Current Settings: Ventricular Assist Device  Review of device function is stable/unstable stable    TXP LVAD INTERROGATIONS 7/25/2023 7/25/2023 7/24/2023 7/24/2023 7/24/2023 7/24/2023 7/24/2023   Type HeartMate3 HeartMate3 HeartMate3 HeartMate3 HeartMate3 HeartMate3 HeartMate3   Flow 4.2 4.3 4.4 4.4 4.3 4.4 4.5   Speed 5100 5150 5100 5100 5100 5100 5100   PI 5.2 4.7 4.0 4.0 5.1 3.9 3.8   Power (Serna) 3.6 3.6 3.6 3.7 3.8 3.6 3.7   LSL 4700 - - - 4700 4700 4700   Low Flow Alarm - - - - - - -   High Power Alarm - - - - - - -   Pulsatility Intermittent pulse Intermittent pulse Intermittent pulse Intermittent pulse Intermittent pulse Intermittent pulse Intermittent pulse

## 2023-07-25 NOTE — ASSESSMENT & PLAN NOTE
History of MSSA infection of drive line.  Cultures obtained on this admission are positive for MSSA as well.      Plan  1. Change antibiotics to IV cefazolin.    2. Anticipate a 6 week course of antibiotics followed by long term suppression.

## 2023-07-25 NOTE — PROGRESS NOTES
[facility] Cardiology Stepdown  Infectious Disease  Progress Note    Patient Name: Kevan Queen  MRN: [id number]  Admission Date: 7/22/2023  Length of Stay: 2 days  Attending Physician: Natalya Villatoro MD  Primary Care Provider: ORALIA Cline    Isolation Status: No active isolations  Assessment/Plan:      Pulmonary  Acute respiratory failure with hypoxia  Secondary to COVID-19 infection.  May also be related to sepsis from bacteremia.      Plan    1. Remdesivir for 5 days.    2. Dexamethasone for 10 days.    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  Cultures obtained on this admission are positive for MSSA as well.      Plan  1. Change antibiotics to IV cefazolin.    2. Anticipate a 6 week course of antibiotics followed by long term suppression.    Staphylococcus aureus bacteremia  38 year old male with a history of MSSA infection of his drive line exit site.  He is admitted with sepsis.  Blood cultures are now positive for MSSA.    Plan    1. Continue IV cefazolin.    2. Monitor repeat blood cultures.    3. Anticipate 6 weeks of antibiotics.        Anticipated Disposition: TBD    Thank you for your consult. I will follow-up with patient. Please contact us if you have any additional questions.    Benny Paul MD  Infectious Disease  [facility] - Cardiology Stepdown    Subjective:     Principal Problem:Severe sepsis    HPI: 38 year old male with a history of CHF s/p LVAD placement in June of 2022.  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  He was apparently on room air for most of that hospital stay.  He received 3 days of remdesivir.  He was discharged on oral doxycycline to complete a 14 day course for the MSSA drive line infection.  He had tested negative for COVID-19 following his treatment.  He is re-admitted with complaints of  shortness of breath, chest pains and generalized ill feeling. COVID-19 testing is positive again.  Chest x-ray shows diffuse infiltrates bilaterally.  ID is consulted to assist with his management.      Interval History: No adverse events.    Review of Systems   Constitutional:  Positive for fatigue.   Respiratory:  Positive for shortness of breath.    Cardiovascular:  Positive for chest pain.   All other systems reviewed and are negative.  Objective:     Vital Signs (Most Recent):  Temp: 97.6 °F (36.4 °C) (07/24/23 1614)  Pulse: 104 (07/24/23 1827)  Resp: 15 (07/24/23 1759)  BP: (!) 82/0 (07/24/23 1614)  SpO2: 99 % (07/24/23 1614) Vital Signs (24h Range):  Temp:  [97.6 °F (36.4 °C)-98.5 °F (36.9 °C)] 97.6 °F (36.4 °C)  Pulse:  [101-127] 104  Resp:  [12-44] 15  SpO2:  [94 %-99 %] 99 %  BP: (78-88)/(0) 82/0     Weight: 95.8 kg (211 lb 3.2 oz)  Body mass index is 26.4 kg/m².    Estimated Creatinine Clearance: 79.8 mL/min (A) (based on SCr of 1.5 mg/dL (H)).     Physical Exam  Vitals and nursing note reviewed.   Constitutional:       Appearance: He is ill-appearing.   Eyes:      General: No scleral icterus.        Right eye: No discharge.         Left eye: No discharge.   Pulmonary:      Effort: No respiratory distress.   Abdominal:      General: There is no distension.      Comments: Dressings covering LVAD drive line exit site.   Lymphadenopathy:      Cervical: No cervical adenopathy.   Neurological:      General: No focal deficit present.      Mental Status: He is alert and oriented to person, place, and time.        Significant Labs:   Microbiology Results (last 7 days)       Procedure Component Value Units Date/Time    Blood culture [[id number]] Collected: 07/23/23 1305    Order Status: Completed Specimen: Blood from Line, Jugular, Internal Right Updated: 07/24/23 1606     Blood Culture, Routine Gram stain aer bottle: Gram positive cocci in clusters resembling Staph      Results called to and read back by:Cong  RIAN Urena 07/24/2023  16:05    Aerobic culture [[id number]]  (Abnormal)  (Susceptibility) Collected: 07/22/23 0159    Order Status: Completed Specimen: Abscess from Abdomen Updated: 07/24/23 1113     Aerobic Bacterial Culture STAPHYLOCOCCUS AUREUS  Few      Blood culture [[id number]]  (Abnormal) Collected: 07/22/23 1058    Order Status: Completed Specimen: Blood from Peripheral, Hand, Right Updated: 07/24/23 1016     Blood Culture, Routine Gram stain aer bottle: Gram positive cocci in clusters resembling Staph      Results called to and read back by:Zaheer Rae RN 07/23/2023  06:41      STAPHYLOCOCCUS AUREUS  Susceptibility pending  ID consult required at [facility].[place] and [place] locations.      MRSA/SA Rapid ID by PCR from Blood culture [[id number]]  (Abnormal) Collected: 07/22/23 1058    Order Status: Completed Updated: 07/23/23 0557     Staph aureus ID by PCR Positive     Methicillin Resistant ID by PCR Negative    Culture, Anaerobe [[id number]] Collected: 07/22/23 0159    Order Status: Completed Specimen: Abscess from Abdomen Updated: 07/23/23 0551     Anaerobic Culture Culture in progress    Blood culture [[id number]]     Order Status: Sent Specimen: Blood             Significant Imaging: I have reviewed all pertinent imaging results/findings within the past 24 hours.

## 2023-07-25 NOTE — ASSESSMENT & PLAN NOTE
38 year old male with a history of MSSA infection of his drive line exit site.  He is admitted with sepsis.  Blood cultures are now positive for MSSA.    Plan    1. Continue IV cefazolin.    2. Monitor repeat blood cultures.    3. Anticipate 6 weeks of antibiotics.

## 2023-07-25 NOTE — PROGRESS NOTES
07/25/2023  Miracle Caballero    Current provider:  Natalya Villatoro MD    Device interrogation:  TXP LVAD INTERROGATIONS 7/25/2023 7/25/2023 7/25/2023 7/24/2023 7/24/2023 7/24/2023 7/24/2023   Type HeartMate3 HeartMate3 HeartMate3 HeartMate3 HeartMate3 HeartMate3 HeartMate3   Flow 4.6 4.2 4.3 4.4 4.4 4.3 4.4   Speed 5150 5100 5150 5100 5100 5100 5100   PI 3.4 5.2 4.7 4.0 4.0 5.1 3.9   Power (Serna) 3.8 3.6 3.6 3.6 3.7 3.8 3.6   LSL 4700 4700 - - - 4700 4700   Low Flow Alarm - - - - - - -   High Power Alarm - - - - - - -   Pulsatility Intermittent pulse Intermittent pulse Intermittent pulse Intermittent pulse Intermittent pulse Intermittent pulse Intermittent pulse          Rounded on Kevan Queen to ensure all mechanical assist device settings (IABP or VAD) were appropriate and all parameters were within limits.  I was able to ensure all back up equipment was present, the staff had no issues, and the Perfusion Department daily rounding was complete.      For implantable VADs: Interrogation of Ventricular assist device was performed with analysis of device parameters and review of device function. I have personally reviewed the interrogation findings and agree with findings as stated.     In emergency, the nursing units have been notified to contact the perfusion department either by:  Calling [phone] from 630am to 4pm Mon thru Fri, utilizing the On-Call Finder functionality of Epic and searching for Perfusion, or by contacting the hospital  from 4pm to 630am and on weekends and asking to speak with the perfusionist on call.    12:49 PM

## 2023-07-25 NOTE — SUBJECTIVE & OBJECTIVE
Interval History: NAEON. Chest pain better. On RA.     Continuous Infusions:   heparin (porcine) in D5W 22 Units/kg/hr (07/25/23 0647)    milrinone 20mg/100ml D5W (200mcg/ml) 0.25 mcg/kg/min (07/25/23 0647)     Scheduled Meds:   aspirin  81 mg Oral Daily    busPIRone  10 mg Oral BID    ceFAZolin(ANCEF) in D5W 500 mL CONTINUOUS INFUSION  6 g Intravenous Q24H    dexAMETHasone  6 mg Oral Daily    furosemide  80 mg Oral Daily    insulin aspart U-100  10 Units Subcutaneous TIDWM    insulin detemir U-100  15 Units Subcutaneous BID    levETIRAcetam  1,000 mg Oral BID    mirtazapine  15 mg Oral Nightly    polyethylene glycol  17 g Oral Daily    remdesivir infusion  100 mg Intravenous Daily    senna-docusate 8.6-50 mg  2 tablet Oral BID    warfarin  4 mg Oral Daily     PRN Meds:acetaminophen, dextrose 10%, dextrose 10%, glucagon (human recombinant), glucose, glucose, HYDROcodone-acetaminophen, insulin aspart U-100    Review of patient's allergies indicates:   Allergen Reactions    Bumex [bumetanide] Hives    Torsemide Hives     Objective:     Vital Signs (Most Recent):  Temp: 98.1 °F (36.7 °C) (07/25/23 0746)  Pulse: 104 (07/25/23 0746)  Resp: 20 (07/25/23 0746)  BP: (!) 80/0 (07/25/23 0746)  SpO2: (!) 92 % (07/25/23 0746) Vital Signs (24h Range):  Temp:  [97.6 °F (36.4 °C)-98.4 °F (36.9 °C)] 98.1 °F (36.7 °C)  Pulse:  [103-127] 104  Resp:  [13-33] 20  SpO2:  [92 %-99 %] 92 %  BP: ()/(0-76) 80/0     Patient Vitals for the past 72 hrs (Last 3 readings):   Weight   07/25/23 0746 92.3 kg (203 lb 6 oz)   07/24/23 0439 95.8 kg (211 lb 3.2 oz)   07/23/23 1006 98.7 kg (217 lb 9.5 oz)       Body mass index is 25.42 kg/m².      Intake/Output Summary (Last 24 hours) at 7/25/2023 0831  Last data filed at 7/25/2023 0647  Gross per 24 hour   Intake 1477.74 ml   Output 1450 ml   Net 27.74 ml          Physical Exam  Constitutional:       Appearance: Normal appearance.   HENT:      Head: Normocephalic and atraumatic.       Mouth/Throat:      Mouth: Mucous membranes are moist.   Neck:      Comments: RIJ TLC placed 7/23  Cardiovascular:      Rate and Rhythm: Normal rate.      Comments: Normal VAD hum can be heard  Pulmonary:      Effort: Pulmonary effort is normal. No respiratory distress.      Breath sounds: Normal breath sounds. No wheezing or rales.   Abdominal:      General: Abdomen is flat.      Tenderness: There is no abdominal tenderness. There is no guarding.   Musculoskeletal:         General: No swelling.      Cervical back: Normal range of motion and neck supple.   Skin:     General: Skin is warm.      Capillary Refill: Capillary refill takes 2 to 3 seconds.   Neurological:      General: No focal deficit present.      Mental Status: He is alert and oriented to person, place, and time.   Psychiatric:         Mood and Affect: Mood normal.         Behavior: Behavior normal.          Significant Labs:  CBC:  Recent Labs   Lab 07/23/23  0829 07/24/23  0322 07/25/23  0428   WBC 34.88* 32.77* 29.77*  29.77*   RBC 4.00* 3.99* 3.92*  3.92*   HGB 10.6* 10.7* 10.3*  10.3*   HCT 31.4* 31.9* 31.4*  31.4*    460* 506*  506*   MCV 79* 80* 80*  80*   MCH 26.5* 26.8* 26.3*  26.3*   MCHC 33.8 33.5 32.8  32.8       BNP:  Recent Labs   Lab 07/22/23  0248 07/24/23  0322   * 348*       CMP:  Recent Labs   Lab 07/22/23  0452 07/23/23  0323 07/24/23  0040 07/24/23  0322 07/25/23  0428   *   < > 208* 174* 220*   CALCIUM 8.8   < > 9.3 9.4 9.2   ALBUMIN 2.2*  --   --  2.1*  --    PROT 7.3  --   --  7.4  --    *   < > 131* 133* 132*   K 4.0   < > 4.0 3.8 4.2   CO2 21*   < > 28 30* 28   CL 93*   < > 89* 90* 91*   BUN 21*   < > 34* 36* 32*   CREATININE 1.4   < > 1.4 1.5* 1.3   ALKPHOS 198*  --   --  158*  --    ALT 45*  --   --  32  --    AST 59*  --   --  47*  --    BILITOT 1.4*  --   --  1.6*  --     < > = values in this interval not displayed.        Coagulation:   Recent Labs   Lab 07/23/23  0829 07/23/23  3915  07/24/23  0322 07/24/23  1029 07/24/23  1645 07/25/23  0428   INR 1.2  --  1.2  --   --  1.3*   APTT 31.1   < > 32.8* 39.0* 40.2* 38.4*  38.4*    < > = values in this interval not displayed.       LDH:  Recent Labs   Lab 07/23/23  0323 07/24/23  0322 07/25/23  0428   * 409* 486*       Microbiology:  Microbiology Results (last 7 days)       Procedure Component Value Units Date/Time    Blood culture [[id number]] Collected: 07/25/23 0451    Order Status: Sent Specimen: Blood Updated: 07/25/23 0513    Blood culture [[id number]] Collected: 07/25/23 0450    Order Status: Sent Specimen: Blood Updated: 07/25/23 0513    Blood culture [[id number]] Collected: 07/23/23 1305    Order Status: Completed Specimen: Blood from Line, Jugular, Internal Right Updated: 07/24/23 1606     Blood Culture, Routine Gram stain aer bottle: Gram positive cocci in clusters resembling Staph      Results called to and read back by:Cong Urena RN 07/24/2023  16:05    Aerobic culture [[id number]]  (Abnormal)  (Susceptibility) Collected: 07/22/23 0159    Order Status: Completed Specimen: Abscess from Abdomen Updated: 07/24/23 1113     Aerobic Bacterial Culture STAPHYLOCOCCUS AUREUS  Few      Blood culture [[id number]]  (Abnormal) Collected: 07/22/23 1058    Order Status: Completed Specimen: Blood from Peripheral, Hand, Right Updated: 07/24/23 1016     Blood Culture, Routine Gram stain aer bottle: Gram positive cocci in clusters resembling Staph      Results called to and read back by:Zaheer Rae RN 07/23/2023  06:41      STAPHYLOCOCCUS AUREUS  Susceptibility pending  ID consult required at [facility].[place] and [place].      MRSA/SA Rapid ID by PCR from Blood culture [[id number]]  (Abnormal) Collected: 07/22/23 1058    Order Status: Completed Updated: 07/23/23 0557     Staph aureus ID by PCR Positive     Methicillin Resistant ID by PCR Negative    Culture, Anaerobe [[id number]] Collected: 07/22/23 0159    Order Status: Completed  Specimen: Abscess from Abdomen Updated: 07/23/23 0551     Anaerobic Culture Culture in progress    Blood culture [[id number]]     Order Status: Sent Specimen: Blood             I have reviewed all pertinent labs within the past 24 hours.    Estimated Creatinine Clearance: 92.1 mL/min (based on SCr of 1.3 mg/dL).    Diagnostic Results:  I have reviewed and interpreted all pertinent imaging results/findings within the past 24 hours.

## 2023-07-25 NOTE — SIGNIFICANT EVENT
Pt went in to Narrow Complex Tachycardia with HR ~160. Pt asymptomatic and lying in bed at time. VSS. 12lead ordered but pt converted prior to strip. WCTM.

## 2023-07-25 NOTE — NURSING
Nurses Note -- 4 Eyes      7/24/2023   4:30 PM      Skin assessed during: Transfer      [x] No Altered Skin Integrity Present    [x]Prevention Measures Documented      [] Yes- Altered Skin Integrity Present or Discovered   [] LDA Added if Not in Epic (Describe Wound)   [] New Altered Skin Integrity was Present on Admit and Documented in LDA   [] Wound Image Taken    Wound Care Consulted? No    Attending Nurse:  Marissa Anders RN     Second RN/Staff Member:  Irma Cameron RN

## 2023-07-25 NOTE — CARE UPDATE
"RAPID RESPONSE NURSE CHART REVIEW        Chart Reviewed: 07/25/2023, 3:37 PM    MRN: [id number]  Bed: 314/314 A    Dx: Severe sepsis    Kevan Queen has a past medical history of Arthritis, Awareness alteration, transient, Cardiomyopathy, CHF (congestive heart failure), COVID-19, Diabetes mellitus, Dilated cardiomyopathy, Drug abuse, Headache, Hyperglycemia, Hyperosmolar hyperglycemic state (HHS), ICD (implantable cardioverter-defibrillator) in place, Left ventricular assist device (LVAD) complication, initial encounter, Muscle cramping, Renal disorder, SOB (shortness of breath), and Tingling in extremities.    Last VS: BP (!) 78/0 (BP Location: Right arm, Patient Position: Lying)   Pulse 108   Temp 98.1 °F (36.7 °C) (Oral)   Resp 20   Ht 6' 3" (1.905 m)   Wt 92.3 kg (203 lb 6 oz)   SpO2 (!) 93%   BMI 25.42 kg/m²     24H Vital Sign Range:  Temp:  [97.8 °F (36.6 °C)-98.4 °F (36.9 °C)]   Pulse:  [103-160]   Resp:  [15-20]   BP: ()/(0-76)   SpO2:  [90 %-97 %]     Level of Consciousness (AVPU): alert    Recent Labs     07/23/23  0829 07/24/23 0322 07/25/23 0428   WBC 34.88* 32.77* 29.77*  29.77*   HGB 10.6* 10.7* 10.3*  10.3*   HCT 31.4* 31.9* 31.4*  31.4*    460* 506*  506*       Recent Labs     07/23/23  0323 07/24/23  0040 07/24/23  0322 07/25/23  0428   * 131* 133* 132*   K 4.0 4.0 3.8 4.2   CL 88* 89* 90* 91*   CO2 29 28 30* 28   CREATININE 1.3 1.4 1.5* 1.3   * 208* 174* 220*   PHOS 2.9  --  3.7 3.3   MG 2.0 2.0 2.0 2.2        Recent Labs     07/23/23 2025   PO2 26*   POCSATURATED 48*        OXYGEN:  Flow (L/min): 2        MEWS score: 3    Bedside RNMarissa  contacted for tachycardia, HR 160s for short while then HR back down to 110s. Patient asymptomatic. No additional concerns verbalized at this time. Instructed to call [phone] for further concerns or assistance.    Jordyn Smith RN          "

## 2023-07-25 NOTE — PROGRESS NOTES
[facility]  Endocrinology  Progress Note    Admit Date: 7/22/2023     Reason for Consult: Management of T2DM, Hyperglycemia     Surgical Procedure and Date: HM3 implantation 6/23/2022    Diabetes diagnosis year:  2022    Home Diabetes Medications:  Levemir 22 units BID, Novolog 16 units TID with meals in addition to SSI (151-200/+1)     How often checking glucose at home? Freestyle William    BG readings on regimen: 170-low 200s  Hypoglycemia on the regimen?  No  Missed doses on regimen?  Yes    Diabetes Complications include:     Hyperglycemia     Complicating diabetes co morbidities:   History of MI, CHF, and CKD         HPI:   Patient is a 38 y.o. 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 He underwent RVAD removal and chest closure 6/30/2022.  He was weaned off  but he had to restarted due to RVF. He was eventually transitioned to milrinone (secondary to  shortage) now on 0.25 mcg/kg/min.  Patient also has history of driveline infection.  Patient presented to the emergency room today because of pleuritic chest pain that has been going on for the last 3 days however it was more intense today.  Also complains of having shortness of breath and fevers associated with it.  He was spiking fever of 102 in the emergency room with elevated white blood cell count up to 17,000. He was treated with Zosyn.  He was sent here for admission.  Patient was admitted a month back for COVID infection for which he received remdesivir for 3 days. Patient has ground-glass opacities on imaging from outside hospital. Endocrinology consulted for management of T2DM.      Lab Results   Component Value Date    HGBA1C 6.7 (H) 07/22/2023           Interval HPI:   Overnight events: Remains in SICU. COVID  "isolation. BG slightly above goal ranges on current SQ insulin regimen. Remains on Dex 6 mg daily. Diet Adult Regular (IDDSI Level 7) Fluid - 1500mL    Eatin%  Nausea: No  Hypoglycemia and intervention: No  Fever: No  TPN and/or TF: No  If yes, type of TF/TPN and rate: n/a    BP (!) 102/57 (BP Location: Left arm, Patient Position: Lying)   Pulse (!) 114   Temp 98.3 °F (36.8 °C) (Oral)   Resp 20   Ht 6' 3" (1.905 m)   Wt 92.3 kg (203 lb 6 oz)   SpO2 (!) 90%   BMI 25.42 kg/m²     Labs Reviewed and Include    Recent Labs   Lab 23  0428   *   CALCIUM 9.2   *   K 4.2   CO2 28   CL 91*   BUN 32*   CREATININE 1.3     Lab Results   Component Value Date    WBC 29.77 (H) 2023    WBC 29.77 (H) 2023    HGB 10.3 (L) 2023    HGB 10.3 (L) 2023    HCT 31.4 (L) 2023    HCT 31.4 (L) 2023    MCV 80 (L) 2023    MCV 80 (L) 2023     (H) 2023     (H) 2023     No results for input(s): TSH, FREET4 in the last 168 hours.  Lab Results   Component Value Date    HGBA1C 6.7 (H) 2023       Nutritional status:   Body mass index is 25.42 kg/m².  Lab Results   Component Value Date    ALBUMIN 2.1 (L) 2023    ALBUMIN 2.2 (L) 2023    ALBUMIN 3.2 (L) 2023     Lab Results   Component Value Date    PREALBUMIN 9 (L) 2023    PREALBUMIN 17 (L) 2023    PREALBUMIN 26 2023       Estimated Creatinine Clearance: 92.1 mL/min (based on SCr of 1.3 mg/dL).    Accu-Checks  Recent Labs     23  1626 23  2031 23  0800 23  1026 23  1635 23  1637 23  1639 23  2048 23  0817 23  1226   POCTGLUCOSE 157* 188* 151* 213* >500* 258* 273* 225* 203* 237*       Current Medications and/or Treatments Impacting Glycemic Control  Immunotherapy:    Immunosuppressants       None          Steroids:   Hormones (From admission, onward)      Start     Stop Route Frequency Ordered    " 07/22/23 1400  dexAMETHasone tablet 6 mg         08/01 0859 Oral Daily 07/22/23 1253          Pressors:    Autonomic Drugs (From admission, onward)      None          Hyperglycemia/Diabetes Medications:   Antihyperglycemics (From admission, onward)      Start     Stop Route Frequency Ordered    07/25/23 1645  insulin aspart U-100 pen 12 Units         -- SubQ 3 times daily with meals 07/25/23 1309    07/23/23 2100  insulin detemir U-100 (Levemir) pen 15 Units         -- SubQ 2 times daily 07/23/23 0910    07/22/23 1326  insulin aspart U-100 pen 1-10 Units         -- SubQ Before meals & nightly PRN 07/22/23 1226            ASSESSMENT and PLAN    Cardiac/Vascular  LVAD (left ventricular assist device) present  Managed per primary team  Avoid hypoglycemia        ID  * Severe sepsis  Managed per primary team        Endocrine  Type 2 diabetes mellitus with hyperglycemia  BG goal 140-180    Continue Levemir 15 units  BID  Increase Novolog to 12 units TID with meals (20% dose increase; prandial BG excursions noted)   Continue Moderate Dose Correction Scale  BG monitoring ac/hs    ** Please call Endocrine for any BG related issues **    Discharge plans: TBD    Lab Results   Component Value Date    HGBA1C 6.7 (H) 07/22/2023                 Jody Starkey NP  Endocrinology  [facility]

## 2023-07-25 NOTE — RESPIRATORY THERAPY
RAPID RESPONSE RESPIRATORY THERAPY PROACTIVE ROUNDING NOTE             Time of visit: 1324     Code Status: Full Code   : 1985  Bed: 314/314 A:   MRN: [id number]  Time spent at the bedside: < 15 min    SITUATION    Evaluated patient for: HFNC Compliance     BACKGROUND    Patient has a past medical history of Arthritis, Awareness alteration, transient, Cardiomyopathy, CHF (congestive heart failure), COVID-19, Diabetes mellitus, Dilated cardiomyopathy, Drug abuse, Headache, Hyperglycemia, Hyperosmolar hyperglycemic state (HHS), ICD (implantable cardioverter-defibrillator) in place, Left ventricular assist device (LVAD) complication, initial encounter, Muscle cramping, Renal disorder, SOB (shortness of breath), and Tingling in extremities.    24 Hours Vitals Range:  Temp:  [97.8 °F (36.6 °C)-98.4 °F (36.9 °C)]   Pulse:  [103-160]   Resp:  [15-20]   BP: ()/(0-76)   SpO2:  [90 %-97 %]     Labs:    Recent Labs     233 23  0040 232 23  0428   * 131* 133* 132*   K 4.0 4.0 3.8 4.2   CL 88* 89* 90* 91*   CO2 29 28 30* 28   CREATININE 1.3 1.4 1.5* 1.3   * 208* 174* 220*   PHOS 2.9  --  3.7 3.3   MG 2.0 2.0 2.0 2.2        Recent Labs     23   PO2 26*   POCSATURATED 48*       ASSESSMENT/INTERVENTIONS    Pt resting with no respiratory needs at this time    Last VS   Temp: 98.1 °F (36.7 °C) (1612)  Pulse: 108 (1612)  Resp: 20 (1612)  BP: 78/0 ( 1505)  SpO2: 93 % (1612)    Level of Consciousness: Level of Consciousness (AVPU): alert  Respiratory Effort: Respiratory Effort: Normal, Unlabored Expansion/Accessory Muscle Usage: Expansion/Accessory Muscles/Retractions: no use of accessory muscles, no retractions, expansion symmetric  All Lung Field Breath Sounds: All Lung Fields Breath Sounds: diminished  NICOLA Breath Sounds: Posterior:, diminished  LLL Breath Sounds: Posterior:, diminished  O2 Device/Concentration: KITTY  Was the O2 device  able to be weaned? No  Ambu at bedside:      Active Orders   Respiratory Care    Incentive spirometry     Frequency: Q4H     Number of Occurrences: Until Specified    Oxygen PRN     Frequency: PRN     Number of Occurrences: Until Specified     Order Questions:      Device type: Low flow      Device: Nasal Cannula (1- 5 Liters)      LPM: 2      Titrate O2 per Oxygen Titration Protocol: Yes      To maintain SpO2 goal of: >= 90%      Notify MD of: Inability to achieve desired SpO2; Sudden change in patient status and requires 20% increase in FiO2; Patient requires >60% FiO2    Pulse Oximetry Q4H     Frequency: Q4H     Number of Occurrences: Until Specified       RECOMMENDATIONS    We recommend: RRT Recs: Oxygen order modified to PRN      FOLLOW-UP    Please call back the Rapid Response RTJulio RRT at x [phone] for any questions or concerns.

## 2023-07-25 NOTE — PROGRESS NOTES
[facility]  Heart Transplant  Progress Note    Patient Name: Kevan Queen  MRN: [id number]  Admission Date: 7/22/2023  Hospital Length of Stay: 3 days  Attending Physician: Natalya Villatoro MD  Primary Care Provider: ORALIA Cline  Principal Problem:Severe sepsis    Subjective:     Interval History: NAEON. Chest pain better. On RA.     Continuous Infusions:   heparin (porcine) in D5W 22 Units/kg/hr (07/25/23 0647)    milrinone 20mg/100ml D5W (200mcg/ml) 0.25 mcg/kg/min (07/25/23 0647)     Scheduled Meds:   aspirin  81 mg Oral Daily    busPIRone  10 mg Oral BID    ceFAZolin(ANCEF) in D5W 500 mL CONTINUOUS INFUSION  6 g Intravenous Q24H    dexAMETHasone  6 mg Oral Daily    furosemide  80 mg Oral Daily    insulin aspart U-100  10 Units Subcutaneous TIDWM    insulin detemir U-100  15 Units Subcutaneous BID    levETIRAcetam  1,000 mg Oral BID    mirtazapine  15 mg Oral Nightly    polyethylene glycol  17 g Oral Daily    remdesivir infusion  100 mg Intravenous Daily    senna-docusate 8.6-50 mg  2 tablet Oral BID    warfarin  4 mg Oral Daily     PRN Meds:acetaminophen, dextrose 10%, dextrose 10%, glucagon (human recombinant), glucose, glucose, HYDROcodone-acetaminophen, insulin aspart U-100    Review of patient's allergies indicates:   Allergen Reactions    Bumex [bumetanide] Hives    Torsemide Hives     Objective:     Vital Signs (Most Recent):  Temp: 98.1 °F (36.7 °C) (07/25/23 0746)  Pulse: 104 (07/25/23 0746)  Resp: 20 (07/25/23 0746)  BP: (!) 80/0 (07/25/23 0746)  SpO2: (!) 92 % (07/25/23 0746) Vital Signs (24h Range):  Temp:  [97.6 °F (36.4 °C)-98.4 °F (36.9 °C)] 98.1 °F (36.7 °C)  Pulse:  [103-127] 104  Resp:  [13-33] 20  SpO2:  [92 %-99 %] 92 %  BP: ()/(0-76) 80/0     Patient Vitals for the past 72 hrs (Last 3 readings):   Weight   07/25/23 0746 92.3 kg (203 lb 6 oz)   07/24/23 0439 95.8 kg (211 lb 3.2 oz)   07/23/23 1006 98.7 kg (217 lb 9.5 oz)       Body mass index is 25.42  kg/m².      Intake/Output Summary (Last 24 hours) at 7/25/2023 0827  Last data filed at 7/25/2023 0647  Gross per 24 hour   Intake 1477.74 ml   Output 1450 ml   Net 27.74 ml          Physical Exam  Constitutional:       Appearance: Normal appearance.   HENT:      Head: Normocephalic and atraumatic.      Mouth/Throat:      Mouth: Mucous membranes are moist.   Neck:      Comments: RIJ TLC placed 7/23  Cardiovascular:      Rate and Rhythm: Normal rate.      Comments: Normal VAD hum can be heard  Pulmonary:      Effort: Pulmonary effort is normal. No respiratory distress.      Breath sounds: Normal breath sounds. No wheezing or rales.   Abdominal:      General: Abdomen is flat.      Tenderness: There is no abdominal tenderness. There is no guarding.   Musculoskeletal:         General: No swelling.      Cervical back: Normal range of motion and neck supple.   Skin:     General: Skin is warm.      Capillary Refill: Capillary refill takes 2 to 3 seconds.   Neurological:      General: No focal deficit present.      Mental Status: He is alert and oriented to person, place, and time.   Psychiatric:         Mood and Affect: Mood normal.         Behavior: Behavior normal.          Significant Labs:  CBC:  Recent Labs   Lab 07/23/23  0829 07/24/23 0322 07/25/23 0428   WBC 34.88* 32.77* 29.77*  29.77*   RBC 4.00* 3.99* 3.92*  3.92*   HGB 10.6* 10.7* 10.3*  10.3*   HCT 31.4* 31.9* 31.4*  31.4*    460* 506*  506*   MCV 79* 80* 80*  80*   MCH 26.5* 26.8* 26.3*  26.3*   MCHC 33.8 33.5 32.8  32.8       BNP:  Recent Labs   Lab 07/22/23  0248 07/24/23  0322   * 348*       CMP:  Recent Labs   Lab 07/22/23  0452 07/23/23  0323 07/24/23  0040 07/24/23 0322 07/25/23  0428   *   < > 208* 174* 220*   CALCIUM 8.8   < > 9.3 9.4 9.2   ALBUMIN 2.2*  --   --  2.1*  --    PROT 7.3  --   --  7.4  --    *   < > 131* 133* 132*   K 4.0   < > 4.0 3.8 4.2   CO2 21*   < > 28 30* 28   CL 93*   < > 89* 90* 91*   BUN 21*    < > 34* 36* 32*   CREATININE 1.4   < > 1.4 1.5* 1.3   ALKPHOS 198*  --   --  158*  --    ALT 45*  --   --  32  --    AST 59*  --   --  47*  --    BILITOT 1.4*  --   --  1.6*  --     < > = values in this interval not displayed.        Coagulation:   Recent Labs   Lab 07/23/23  0829 07/23/23  1413 07/24/23  0322 07/24/23  1029 07/24/23  1645 07/25/23  0428   INR 1.2  --  1.2  --   --  1.3*   APTT 31.1   < > 32.8* 39.0* 40.2* 38.4*  38.4*    < > = values in this interval not displayed.       LDH:  Recent Labs   Lab 07/23/23  0323 07/24/23  0322 07/25/23  0428   * 409* 486*       Microbiology:  Microbiology Results (last 7 days)       Procedure Component Value Units Date/Time    Blood culture [[id number]] Collected: 07/25/23 0451    Order Status: Sent Specimen: Blood Updated: 07/25/23 0513    Blood culture [[id number]] Collected: 07/25/23 0450    Order Status: Sent Specimen: Blood Updated: 07/25/23 0513    Blood culture [[id number]] Collected: 07/23/23 1305    Order Status: Completed Specimen: Blood from Line, Jugular, Internal Right Updated: 07/24/23 1606     Blood Culture, Routine Gram stain aer bottle: Gram positive cocci in clusters resembling Staph      Results called to and read back by:Cong Urena RN 07/24/2023  16:05    Aerobic culture [[id number]]  (Abnormal)  (Susceptibility) Collected: 07/22/23 0159    Order Status: Completed Specimen: Abscess from Abdomen Updated: 07/24/23 1113     Aerobic Bacterial Culture STAPHYLOCOCCUS AUREUS  Few      Blood culture [[id number]]  (Abnormal) Collected: 07/22/23 1058    Order Status: Completed Specimen: Blood from Peripheral, Hand, Right Updated: 07/24/23 1016     Blood Culture, Routine Gram stain aer bottle: Gram positive cocci in clusters resembling Staph      Results called to and read back by:Zaheer Rae RN 07/23/2023  06:41      STAPHYLOCOCCUS AUREUS  Susceptibility pending  ID consult required at [facility].[place] and [place] locations.      MRSA/SA Rapid ID  by PCR from Blood culture [[id number]]  (Abnormal) Collected: 07/22/23 1058    Order Status: Completed Updated: 07/23/23 0557     Staph aureus ID by PCR Positive     Methicillin Resistant ID by PCR Negative    Culture, Anaerobe [[id number]] Collected: 07/22/23 0159    Order Status: Completed Specimen: Abscess from Abdomen Updated: 07/23/23 0551     Anaerobic Culture Culture in progress    Blood culture [[id number]]     Order Status: Sent Specimen: Blood             I have reviewed all pertinent labs within the past 24 hours.    Estimated Creatinine Clearance: 92.1 mL/min (based on SCr of 1.3 mg/dL).    Diagnostic Results:  I have reviewed and interpreted all pertinent imaging results/findings within the past 24 hours.    Assessment and Plan:     Patient is a 38 yo black male with stage d HFrEF, NICM, ? 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 He underwent RVAD removal and chest closure 6/30/2022.  He was weaned off  but he had to restarted due to RVF. He was eventually transitioned to milrinone (secondary to  shortage) now on 0.25 mcg/kg/min.  Patient also has history of driveline infection.  Patient presented to the emergency room today because of pleuritic chest pain that has been going on for the last 3 days however it was more intense today.  Also complains of having shortness of breath and fevers associated with it.  He was spiking fever of 102 in the emergency room with elevated white blood cell count up to 17,000. He was treated with Zosyn.  He was sent here for admission.  Patient was admitted a month back for COVID infection for which he received remdesivir for 3 days.  He is on doxycycline for chronic driveline infection.  He is on Milrinone for RV failure.  Patient denies having any low flow alarms on the pump.  Also denies having any  lower extremity edema, increased discharge from his driveline site.  Patient has ground-glass opacities on imaging from outside hospital      * Severe sepsis  Patient presents with elevated fevers, white blood cell count, elevated lactic acid.    ID following. Possible Secondary to COVID-19 infection.    Continue:    1. Remdesivir for 5 days.   2. Dexamethasone for 10 days.  History of MSSA infection of drive line.  Blood Cultures obtained on this admission are positive for MSSA as well.    Continue Cefazolin 6 grams IV q 24 hours by continuous infusion.      LVAD (left ventricular assist device) present  -HeartMate 3 Implanted on 6/29/2022 as DT with RV failure on milrinone at 0.25 mcg/kg/min  -Continue Coumadin, Goal INR 2.0-3.0. Sub Therapeutic today. Con Heparin.   -LDH is stable.  Will continue to monitor daily.  -Speed set at 5100, LSL 4700 rpm      Procedure: Device Interrogation Including analysis of device parameters  Current Settings: Ventricular Assist Device  Review of device function is stable/unstable stable    TXP LVAD INTERROGATIONS 7/25/2023 7/25/2023 7/24/2023 7/24/2023 7/24/2023 7/24/2023 7/24/2023   Type HeartMate3 HeartMate3 HeartMate3 HeartMate3 HeartMate3 HeartMate3 HeartMate3   Flow 4.2 4.3 4.4 4.4 4.3 4.4 4.5   Speed 5100 5150 5100 5100 5100 5100 5100   PI 5.2 4.7 4.0 4.0 5.1 3.9 3.8   Power (Serna) 3.6 3.6 3.6 3.7 3.8 3.6 3.7   LSL 4700 - - - 4700 4700 4700   Low Flow Alarm - - - - - - -   High Power Alarm - - - - - - -   Pulsatility Intermittent pulse Intermittent pulse Intermittent pulse Intermittent pulse Intermittent pulse Intermittent pulse Intermittent pulse       Seizure-like activity  On on Keppra    Type 2 diabetes mellitus with hyperglycemia  Will keep him on sliding scale and a.c. HS    Chronic combined systolic and diastolic heart failure  Status post LVAD.  On GDMT with valsartan and Aldactone      Fausto Reyes, NP  Heart Transplant  [facility]

## 2023-07-25 NOTE — ASSESSMENT & PLAN NOTE
Patient presents with elevated fevers, white blood cell count, elevated lactic acid.    ID following. Possible Secondary to COVID-19 infection.    Continue:    1. Remdesivir for 5 days.   2. Dexamethasone for 10 days.  History of MSSA infection of drive line.  Blood Cultures obtained on this admission are positive for MSSA as well.    Continue Cefazolin 6 grams IV q 24 hours by continuous infusion.

## 2023-07-25 NOTE — NURSING
Received a call from Manuel Harvey RN)  ER nurse from [facility] ER that pt's blood cultures drawn on 7/21/23 were positive for staph/aureus.  Updated SEnrique Reyes NP and primary nurse.   No orders given

## 2023-07-25 NOTE — ASSESSMENT & PLAN NOTE
BG goal 140-180    Continue Levemir 15 units  BID  Increase Novolog to 12 units TID with meals (20% dose increase; prandial BG excursions noted)   Continue Moderate Dose Correction Scale  BG monitoring ac/hs    ** Please call Endocrine for any BG related issues **    Discharge plans: TBD    Lab Results   Component Value Date    HGBA1C 6.7 (H) 07/22/2023

## 2023-07-25 NOTE — SUBJECTIVE & OBJECTIVE
"Interval HPI:   Overnight events: Remains in SICU. COVID isolation. BG slightly above goal ranges on current SQ insulin regimen. Remains on Dex 6 mg daily. Diet Adult Regular (IDDSI Level 7) Fluid - 1500mL    Eatin%  Nausea: No  Hypoglycemia and intervention: No  Fever: No  TPN and/or TF: No  If yes, type of TF/TPN and rate: n/a    BP (!) 102/57 (BP Location: Left arm, Patient Position: Lying)   Pulse (!) 114   Temp 98.3 °F (36.8 °C) (Oral)   Resp 20   Ht 6' 3" (1.905 m)   Wt 92.3 kg (203 lb 6 oz)   SpO2 (!) 90%   BMI 25.42 kg/m²     Labs Reviewed and Include    Recent Labs   Lab 23  0428   *   CALCIUM 9.2   *   K 4.2   CO2 28   CL 91*   BUN 32*   CREATININE 1.3     Lab Results   Component Value Date    WBC 29.77 (H) 2023    WBC 29.77 (H) 2023    HGB 10.3 (L) 2023    HGB 10.3 (L) 2023    HCT 31.4 (L) 2023    HCT 31.4 (L) 2023    MCV 80 (L) 2023    MCV 80 (L) 2023     (H) 2023     (H) 2023     No results for input(s): TSH, FREET4 in the last 168 hours.  Lab Results   Component Value Date    HGBA1C 6.7 (H) 2023       Nutritional status:   Body mass index is 25.42 kg/m².  Lab Results   Component Value Date    ALBUMIN 2.1 (L) 2023    ALBUMIN 2.2 (L) 2023    ALBUMIN 3.2 (L) 2023     Lab Results   Component Value Date    PREALBUMIN 9 (L) 2023    PREALBUMIN 17 (L) 2023    PREALBUMIN 26 2023       Estimated Creatinine Clearance: 92.1 mL/min (based on SCr of 1.3 mg/dL).    Accu-Checks  Recent Labs     23  1626 23  2031 23  0800 23  1026 23  1635 23  1637 23  1639 23  2048 23  0817 23  1226   POCTGLUCOSE 157* 188* 151* 213* >500* 258* 273* 225* 203* 237*       Current Medications and/or Treatments Impacting Glycemic Control  Immunotherapy:    Immunosuppressants       None          Steroids:   Hormones (From admission, " onward)      Start     Stop Route Frequency Ordered    07/22/23 1400  dexAMETHasone tablet 6 mg         08/01 0859 Oral Daily 07/22/23 1253          Pressors:    Autonomic Drugs (From admission, onward)      None          Hyperglycemia/Diabetes Medications:   Antihyperglycemics (From admission, onward)      Start     Stop Route Frequency Ordered    07/25/23 1645  insulin aspart U-100 pen 12 Units         -- SubQ 3 times daily with meals 07/25/23 1309    07/23/23 2100  insulin detemir U-100 (Levemir) pen 15 Units         -- SubQ 2 times daily 07/23/23 0910    07/22/23 1326  insulin aspart U-100 pen 1-10 Units         -- SubQ Before meals & nightly PRN 07/22/23 1226

## 2023-07-26 LAB
ALBUMIN SERPL BCP-MCNC: 2.1 G/DL (ref 3.5–5.2)
ALP SERPL-CCNC: 143 U/L (ref 55–135)
ALT SERPL W/O P-5'-P-CCNC: 58 U/L (ref 10–44)
ANION GAP SERPL CALC-SCNC: 13 MMOL/L (ref 8–16)
ANISOCYTOSIS BLD QL SMEAR: SLIGHT
APTT PPP: 53.6 SEC (ref 21–32)
AST SERPL-CCNC: 79 U/L (ref 10–40)
BACTERIA BLD CULT: ABNORMAL
BASOPHILS # BLD AUTO: 0.07 K/UL (ref 0–0.2)
BASOPHILS NFR BLD: 0.2 % (ref 0–1.9)
BILIRUB DIRECT SERPL-MCNC: 0.7 MG/DL (ref 0.1–0.3)
BILIRUB SERPL-MCNC: 1 MG/DL (ref 0.1–1)
BNP SERPL-MCNC: 273 PG/ML (ref 0–99)
BUN SERPL-MCNC: 31 MG/DL (ref 6–20)
BURR CELLS BLD QL SMEAR: ABNORMAL
CALCIUM SERPL-MCNC: 9 MG/DL (ref 8.7–10.5)
CHLORIDE SERPL-SCNC: 93 MMOL/L (ref 95–110)
CO2 SERPL-SCNC: 26 MMOL/L (ref 23–29)
CREAT SERPL-MCNC: 1.3 MG/DL (ref 0.5–1.4)
CRP SERPL-MCNC: 112.4 MG/L (ref 0–8.2)
DIFFERENTIAL METHOD: ABNORMAL
EOSINOPHIL # BLD AUTO: 0 K/UL (ref 0–0.5)
EOSINOPHIL NFR BLD: 0 % (ref 0–8)
ERYTHROCYTE [DISTWIDTH] IN BLOOD BY AUTOMATED COUNT: 22 % (ref 11.5–14.5)
EST. GFR  (NO RACE VARIABLE): >60 ML/MIN/1.73 M^2
GLUCOSE SERPL-MCNC: 369 MG/DL (ref 70–110)
HCT VFR BLD AUTO: 30.6 % (ref 40–54)
HGB BLD-MCNC: 10.3 G/DL (ref 14–18)
HYPOCHROMIA BLD QL SMEAR: ABNORMAL
IMM GRANULOCYTES # BLD AUTO: 0.64 K/UL (ref 0–0.04)
IMM GRANULOCYTES NFR BLD AUTO: 2 % (ref 0–0.5)
INR PPP: 1.5 (ref 0.8–1.2)
LDH SERPL L TO P-CCNC: 415 U/L (ref 110–260)
LYMPHOCYTES # BLD AUTO: 2.2 K/UL (ref 1–4.8)
LYMPHOCYTES NFR BLD: 7.1 % (ref 18–48)
MAGNESIUM SERPL-MCNC: 2.1 MG/DL (ref 1.6–2.6)
MCH RBC QN AUTO: 27 PG (ref 27–31)
MCHC RBC AUTO-ENTMCNC: 33.7 G/DL (ref 32–36)
MCV RBC AUTO: 80 FL (ref 82–98)
MONOCYTES # BLD AUTO: 2 K/UL (ref 0.3–1)
MONOCYTES NFR BLD: 6.2 % (ref 4–15)
NEUTROPHILS # BLD AUTO: 26.5 K/UL (ref 1.8–7.7)
NEUTROPHILS NFR BLD: 84.5 % (ref 38–73)
NRBC BLD-RTO: 0 /100 WBC
PHOSPHATE SERPL-MCNC: 2.9 MG/DL (ref 2.7–4.5)
PLATELET # BLD AUTO: 555 K/UL (ref 150–450)
PLATELET BLD QL SMEAR: ABNORMAL
PMV BLD AUTO: 9.8 FL (ref 9.2–12.9)
POCT GLUCOSE: 176 MG/DL (ref 70–110)
POCT GLUCOSE: 259 MG/DL (ref 70–110)
POCT GLUCOSE: 269 MG/DL (ref 70–110)
POCT GLUCOSE: 273 MG/DL (ref 70–110)
POIKILOCYTOSIS BLD QL SMEAR: SLIGHT
POLYCHROMASIA BLD QL SMEAR: ABNORMAL
POTASSIUM SERPL-SCNC: 4.1 MMOL/L (ref 3.5–5.1)
PREALB SERPL-MCNC: 14 MG/DL (ref 20–43)
PROT SERPL-MCNC: 7.2 G/DL (ref 6–8.4)
PROTHROMBIN TIME: 15.8 SEC (ref 9–12.5)
RBC # BLD AUTO: 3.82 M/UL (ref 4.6–6.2)
SCHISTOCYTES BLD QL SMEAR: ABNORMAL
SCHISTOCYTES BLD QL SMEAR: PRESENT
SODIUM SERPL-SCNC: 132 MMOL/L (ref 136–145)
WBC # BLD AUTO: 31.42 K/UL (ref 3.9–12.7)

## 2023-07-26 PROCEDURE — 85025 COMPLETE CBC W/AUTO DIFF WBC: CPT | Performed by: STUDENT IN AN ORGANIZED HEALTH CARE EDUCATION/TRAINING PROGRAM

## 2023-07-26 PROCEDURE — 25000003 PHARM REV CODE 250: Performed by: STUDENT IN AN ORGANIZED HEALTH CARE EDUCATION/TRAINING PROGRAM

## 2023-07-26 PROCEDURE — 99232 PR SUBSEQUENT HOSPITAL CARE,LEVL II: ICD-10-PCS | Mod: ,,, | Performed by: NURSE PRACTITIONER

## 2023-07-26 PROCEDURE — 80048 BASIC METABOLIC PNL TOTAL CA: CPT | Performed by: HOSPITALIST

## 2023-07-26 PROCEDURE — 99233 PR SUBSEQUENT HOSPITAL CARE,LEVL III: ICD-10-PCS | Mod: ,,, | Performed by: NURSE PRACTITIONER

## 2023-07-26 PROCEDURE — 83880 ASSAY OF NATRIURETIC PEPTIDE: CPT | Performed by: HOSPITALIST

## 2023-07-26 PROCEDURE — 85730 THROMBOPLASTIN TIME PARTIAL: CPT | Performed by: STUDENT IN AN ORGANIZED HEALTH CARE EDUCATION/TRAINING PROGRAM

## 2023-07-26 PROCEDURE — 25000003 PHARM REV CODE 250: Performed by: HOSPITALIST

## 2023-07-26 PROCEDURE — 85610 PROTHROMBIN TIME: CPT | Performed by: HOSPITALIST

## 2023-07-26 PROCEDURE — 99232 SBSQ HOSP IP/OBS MODERATE 35: CPT | Mod: ,,, | Performed by: NURSE PRACTITIONER

## 2023-07-26 PROCEDURE — 63600175 PHARM REV CODE 636 W HCPCS: Performed by: STUDENT IN AN ORGANIZED HEALTH CARE EDUCATION/TRAINING PROGRAM

## 2023-07-26 PROCEDURE — 25000003 PHARM REV CODE 250: Performed by: NURSE PRACTITIONER

## 2023-07-26 PROCEDURE — 27000248 HC VAD-ADDITIONAL DAY

## 2023-07-26 PROCEDURE — 80076 HEPATIC FUNCTION PANEL: CPT | Performed by: HOSPITALIST

## 2023-07-26 PROCEDURE — 25000003 PHARM REV CODE 250: Performed by: INTERNAL MEDICINE

## 2023-07-26 PROCEDURE — 84134 ASSAY OF PREALBUMIN: CPT | Performed by: HOSPITALIST

## 2023-07-26 PROCEDURE — 63600175 PHARM REV CODE 636 W HCPCS: Performed by: HOSPITALIST

## 2023-07-26 PROCEDURE — 27000207 HC ISOLATION

## 2023-07-26 PROCEDURE — 83615 LACTATE (LD) (LDH) ENZYME: CPT | Performed by: HOSPITALIST

## 2023-07-26 PROCEDURE — 84100 ASSAY OF PHOSPHORUS: CPT | Performed by: HOSPITALIST

## 2023-07-26 PROCEDURE — 99233 SBSQ HOSP IP/OBS HIGH 50: CPT | Mod: ,,, | Performed by: NURSE PRACTITIONER

## 2023-07-26 PROCEDURE — 20600001 HC STEP DOWN PRIVATE ROOM

## 2023-07-26 PROCEDURE — 93750 INTERROGATION VAD IN PERSON: CPT | Mod: ,,, | Performed by: INTERNAL MEDICINE

## 2023-07-26 PROCEDURE — 99233 PR SUBSEQUENT HOSPITAL CARE,LEVL III: ICD-10-PCS | Mod: ,,, | Performed by: INTERNAL MEDICINE

## 2023-07-26 PROCEDURE — 63600175 PHARM REV CODE 636 W HCPCS: Performed by: INTERNAL MEDICINE

## 2023-07-26 PROCEDURE — 99233 SBSQ HOSP IP/OBS HIGH 50: CPT | Mod: ,,, | Performed by: INTERNAL MEDICINE

## 2023-07-26 PROCEDURE — 83735 ASSAY OF MAGNESIUM: CPT | Performed by: HOSPITALIST

## 2023-07-26 PROCEDURE — 93750 PR INTERROGATE VENT ASSIST DEV, IN PERSON, W PHYSICIAN ANALYSIS: ICD-10-PCS | Mod: ,,, | Performed by: INTERNAL MEDICINE

## 2023-07-26 PROCEDURE — 86140 C-REACTIVE PROTEIN: CPT | Performed by: HOSPITALIST

## 2023-07-26 RX ORDER — INSULIN ASPART 100 [IU]/ML
16 INJECTION, SOLUTION INTRAVENOUS; SUBCUTANEOUS
Status: DISCONTINUED | OUTPATIENT
Start: 2023-07-26 | End: 2023-07-27

## 2023-07-26 RX ORDER — INSULIN ASPART 100 [IU]/ML
14 INJECTION, SOLUTION INTRAVENOUS; SUBCUTANEOUS
Status: DISCONTINUED | OUTPATIENT
Start: 2023-07-26 | End: 2023-07-26

## 2023-07-26 RX ADMIN — MIRTAZAPINE 15 MG: 15 TABLET, FILM COATED ORAL at 09:07

## 2023-07-26 RX ADMIN — INSULIN ASPART 6 UNITS: 100 INJECTION, SOLUTION INTRAVENOUS; SUBCUTANEOUS at 09:07

## 2023-07-26 RX ADMIN — HYDROCODONE BITARTRATE AND ACETAMINOPHEN 1 TABLET: 5; 325 TABLET ORAL at 09:07

## 2023-07-26 RX ADMIN — SENNOSIDES AND DOCUSATE SODIUM 2 TABLET: 50; 8.6 TABLET ORAL at 09:07

## 2023-07-26 RX ADMIN — INSULIN ASPART 6 UNITS: 100 INJECTION, SOLUTION INTRAVENOUS; SUBCUTANEOUS at 01:07

## 2023-07-26 RX ADMIN — DEXAMETHASONE 6 MG: 4 TABLET ORAL at 09:07

## 2023-07-26 RX ADMIN — WARFARIN SODIUM 4 MG: 4 TABLET ORAL at 05:07

## 2023-07-26 RX ADMIN — LEVETIRACETAM 1000 MG: 500 TABLET, FILM COATED ORAL at 09:07

## 2023-07-26 RX ADMIN — MILRINONE LACTATE 0.25 MCG/KG/MIN: 0.2 INJECTION, SOLUTION INTRAVENOUS at 11:07

## 2023-07-26 RX ADMIN — INSULIN ASPART 16 UNITS: 100 INJECTION, SOLUTION INTRAVENOUS; SUBCUTANEOUS at 05:07

## 2023-07-26 RX ADMIN — INSULIN ASPART 2 UNITS: 100 INJECTION, SOLUTION INTRAVENOUS; SUBCUTANEOUS at 05:07

## 2023-07-26 RX ADMIN — POLYETHYLENE GLYCOL 3350 17 G: 17 POWDER, FOR SOLUTION ORAL at 09:07

## 2023-07-26 RX ADMIN — INSULIN ASPART 3 UNITS: 100 INJECTION, SOLUTION INTRAVENOUS; SUBCUTANEOUS at 09:07

## 2023-07-26 RX ADMIN — FUROSEMIDE 80 MG: 80 TABLET ORAL at 09:07

## 2023-07-26 RX ADMIN — BUSPIRONE HYDROCHLORIDE 10 MG: 10 TABLET ORAL at 09:07

## 2023-07-26 RX ADMIN — ASPIRIN 81 MG: 81 TABLET, COATED ORAL at 09:07

## 2023-07-26 RX ADMIN — HEPARIN SODIUM 22 UNITS/KG/HR: 10000 INJECTION, SOLUTION INTRAVENOUS at 11:07

## 2023-07-26 RX ADMIN — CEFAZOLIN 6 G: 2 INJECTION, POWDER, FOR SOLUTION INTRAMUSCULAR; INTRAVENOUS at 11:07

## 2023-07-26 RX ADMIN — INSULIN ASPART 14 UNITS: 100 INJECTION, SOLUTION INTRAVENOUS; SUBCUTANEOUS at 09:07

## 2023-07-26 RX ADMIN — REMDESIVIR 100 MG: 100 INJECTION, POWDER, LYOPHILIZED, FOR SOLUTION INTRAVENOUS at 09:07

## 2023-07-26 RX ADMIN — INSULIN ASPART 16 UNITS: 100 INJECTION, SOLUTION INTRAVENOUS; SUBCUTANEOUS at 01:07

## 2023-07-26 NOTE — NURSING
Pt's VAD dressing changed per protocol using kit and sterile technique. Pt's drive line site is clean, dry, intact with scant brown drainage at inner layer of old dressing; DLES is + (2). No kinks or frays on drive line, secured with tape per pt request. Pt tolerated dressing change well. Next dressing change due 07/27/2023.

## 2023-07-26 NOTE — SUBJECTIVE & OBJECTIVE
"Interval HPI:   Overnight events: Remains in CSU. BG above goal ranges on current SQ insulin regimen. Remains on Dex 6 mg daily (x 10 days). Diet Adult Regular (IDDSI Level 7) Fluid - 1500mL    Eatin%  Nausea: No  Hypoglycemia and intervention: No  Fever: No  TPN and/or TF: No  If yes, type of TF/TPN and rate: n/a    BP 94/73 (BP Location: Right arm, Patient Position: Lying)   Pulse 102   Temp 97 °F (36.1 °C) (Temporal)   Resp 18   Ht 6' 3" (1.905 m)   Wt 92.3 kg (203 lb 6 oz)   SpO2 (!) 94%   BMI 25.42 kg/m²     Labs Reviewed and Include    Recent Labs   Lab 23  0422   *   CALCIUM 9.0   ALBUMIN 2.1*   PROT 7.2   *   K 4.1   CO2 26   CL 93*   BUN 31*   CREATININE 1.3   ALKPHOS 143*   ALT 58*   AST 79*   BILITOT 1.0     Lab Results   Component Value Date    WBC 31.42 (H) 2023    HGB 10.3 (L) 2023    HCT 30.6 (L) 2023    MCV 80 (L) 2023     (H) 2023     No results for input(s): TSH, FREET4 in the last 168 hours.  Lab Results   Component Value Date    HGBA1C 6.7 (H) 2023       Nutritional status:   Body mass index is 25.42 kg/m².  Lab Results   Component Value Date    ALBUMIN 2.1 (L) 2023    ALBUMIN 2.1 (L) 2023    ALBUMIN 2.2 (L) 2023     Lab Results   Component Value Date    PREALBUMIN 14 (L) 2023    PREALBUMIN 9 (L) 2023    PREALBUMIN 17 (L) 2023       Estimated Creatinine Clearance: 92.1 mL/min (based on SCr of 1.3 mg/dL).    Accu-Checks  Recent Labs     23  1026 23  1635 23  1637 23  1639 23  2048 23  0817 23  1226 23  1615 23  0759   POCTGLUCOSE 213* >500* 258* 273* 225* 203* 237* 311* 297* 273*       Current Medications and/or Treatments Impacting Glycemic Control  Immunotherapy:    Immunosuppressants       None          Steroids:   Hormones (From admission, onward)      Start     Stop Route Frequency Ordered    23 1400  " dexAMETHasone tablet 6 mg         08/01 0859 Oral Daily 07/22/23 1253          Pressors:    Autonomic Drugs (From admission, onward)      None          Hyperglycemia/Diabetes Medications:   Antihyperglycemics (From admission, onward)      Start     Stop Route Frequency Ordered    07/26/23 0900  insulin detemir U-100 (Levemir) pen 18 Units         -- SubQ 2 times daily 07/26/23 0833    07/26/23 0845  insulin aspart U-100 pen 14 Units         -- SubQ 3 times daily with meals 07/26/23 0832    07/22/23 1326  insulin aspart U-100 pen 1-10 Units         -- SubQ Before meals & nightly PRN 07/22/23 1226

## 2023-07-26 NOTE — ASSESSMENT & PLAN NOTE
-HeartMate 3 Implanted on 6/29/2022 as DT with RV failure on milrinone at 0.25 mcg/kg/min  -Will need to replace pick.  -Continue Coumadin, Goal INR 2.0-3.0. Sub Therapeutic today. Con Heparin.   -LDH is stable.  Will continue to monitor daily.  -Speed set at 5100, LSL 4700 rpm      Procedure: Device Interrogation Including analysis of device parameters  Current Settings: Ventricular Assist Device  Review of device function is stable/unstable stable    TXP LVAD INTERROGATIONS 7/26/2023 7/26/2023 7/25/2023 7/25/2023 7/25/2023 7/25/2023 7/25/2023   Type HeartMate3 HeartMate3 HeartMate3 HeartMate3 HeartMate3 HeartMate3 HeartMate3   Flow 4.1 4.2 4.4 4.5 4.6 4.2 4.3   Speed 5100 5100 5100 5100 5150 5100 5150   PI 5.2 5.3 3.4 3.3 3.4 5.2 4.7   Power (Serna) 3.8 3.8 3.6 3.6 3.8 3.6 3.6   LSL 4700 4700 4700 4700 4700 4700 -   Low Flow Alarm - - - - - - -   High Power Alarm - - - - - - -   Pulsatility - Pulse No Pulse Intermittent pulse Intermittent pulse Intermittent pulse Intermittent pulse      [facility]  Internal Medicine Team 1

## 2023-07-26 NOTE — PROGRESS NOTES
[facility] - Cardiology Stepdown  Heart Transplant  Progress Note    Patient Name: Kevan Queen  MRN: [id number]  Admission Date: 7/22/2023  Hospital Length of Stay: 4 days  Attending Physician: Natalya Villatoro MD  Primary Care Provider: ORALIA Cline  Principal Problem:Severe sepsis    Subjective:     Interval History: NAEON. Chest pain improving. On RA.     Continuous Infusions:   heparin (porcine) in D5W 22 Units/kg/hr (07/25/23 2207)    milrinone 20mg/100ml D5W (200mcg/ml) 0.25 mcg/kg/min (07/25/23 2010)     Scheduled Meds:   aspirin  81 mg Oral Daily    busPIRone  10 mg Oral BID    ceFAZolin(ANCEF) in D5W 500 mL CONTINUOUS INFUSION  6 g Intravenous Q24H    dexAMETHasone  6 mg Oral Daily    furosemide  80 mg Oral Daily    insulin aspart U-100  12 Units Subcutaneous TIDWM    insulin detemir U-100  15 Units Subcutaneous BID    levETIRAcetam  1,000 mg Oral BID    mirtazapine  15 mg Oral Nightly    polyethylene glycol  17 g Oral Daily    remdesivir infusion  100 mg Intravenous Daily    senna-docusate 8.6-50 mg  2 tablet Oral BID    warfarin  4 mg Oral Daily     PRN Meds:acetaminophen, dextrose 10%, dextrose 10%, glucagon (human recombinant), glucose, glucose, HYDROcodone-acetaminophen, insulin aspart U-100    Review of patient's allergies indicates:   Allergen Reactions    Bumex [bumetanide] Hives    Torsemide Hives     Objective:     Vital Signs (Most Recent):  Temp: 97 °F (36.1 °C) (07/26/23 0428)  Pulse: 102 (07/26/23 0604)  Resp: 18 (07/26/23 0428)  BP: 94/73 (07/26/23 0428)  SpO2: (!) 94 % (07/26/23 0428) Vital Signs (24h Range):  Temp:  [97 °F (36.1 °C)-98.3 °F (36.8 °C)] 97 °F (36.1 °C)  Pulse:  [] 102  Resp:  [18-22] 18  SpO2:  [90 %-96 %] 94 %  BP: ()/(0-73) 94/73     Patient Vitals for the past 72 hrs (Last 3 readings):   Weight   07/25/23 0746 92.3 kg (203 lb 6 oz)   07/24/23 0439 95.8 kg (211 lb 3.2 oz)   07/23/23 1006 98.7 kg (217 lb 9.5 oz)       Body mass index is 25.42  kg/m².      Intake/Output Summary (Last 24 hours) at 7/26/2023 0746  Last data filed at 7/26/2023 0430  Gross per 24 hour   Intake 880 ml   Output 1525 ml   Net -645 ml          Physical Exam  Constitutional:       Appearance: Normal appearance.   HENT:      Head: Normocephalic and atraumatic.      Mouth/Throat:      Mouth: Mucous membranes are moist.   Neck:      Comments: RIJ TLC placed 7/23  Cardiovascular:      Rate and Rhythm: Normal rate.      Comments: Normal VAD hum can be heard  Pulmonary:      Effort: Pulmonary effort is normal. No respiratory distress.      Breath sounds: Normal breath sounds. No wheezing or rales.   Abdominal:      General: Abdomen is flat.      Tenderness: There is no abdominal tenderness. There is no guarding.   Musculoskeletal:         General: No swelling.      Cervical back: Normal range of motion and neck supple.   Skin:     General: Skin is warm.      Capillary Refill: Capillary refill takes 2 to 3 seconds.   Neurological:      General: No focal deficit present.      Mental Status: He is alert and oriented to person, place, and time.   Psychiatric:         Mood and Affect: Mood normal.         Behavior: Behavior normal.          Significant Labs:  CBC:  Recent Labs   Lab 07/24/23 0322 07/25/23 0428 07/26/23 0422   WBC 32.77* 29.77*  29.77* 31.42*   RBC 3.99* 3.92*  3.92* 3.82*   HGB 10.7* 10.3*  10.3* 10.3*   HCT 31.9* 31.4*  31.4* 30.6*   * 506*  506* 555*   MCV 80* 80*  80* 80*   MCH 26.8* 26.3*  26.3* 27.0   MCHC 33.5 32.8  32.8 33.7       BNP:  Recent Labs   Lab 07/22/23  0248 07/24/23  0322 07/26/23 0422   * 348* 273*       CMP:  Recent Labs   Lab 07/22/23  0452 07/23/23  0323 07/24/23  0322 07/25/23 0428 07/26/23 0422   *   < > 174* 220* 369*   CALCIUM 8.8   < > 9.4 9.2 9.0   ALBUMIN 2.2*  --  2.1*  --  2.1*   PROT 7.3  --  7.4  --  7.2   *   < > 133* 132* 132*   K 4.0   < > 3.8 4.2 4.1   CO2 21*   < > 30* 28 26   CL 93*   < > 90* 91*  93*   BUN 21*   < > 36* 32* 31*   CREATININE 1.4   < > 1.5* 1.3 1.3   ALKPHOS 198*  --  158*  --  143*   ALT 45*  --  32  --  58*   AST 59*  --  47*  --  79*   BILITOT 1.4*  --  1.6*  --  1.0    < > = values in this interval not displayed.        Coagulation:   Recent Labs   Lab 07/24/23  0322 07/24/23  1029 07/25/23  0428 07/25/23  1238 07/25/23  1836 07/26/23  0422   INR 1.2  --  1.3*  --   --  1.5*   APTT 32.8*   < > 38.4*  38.4* 46.4* 47.4* 53.6*    < > = values in this interval not displayed.       LDH:  Recent Labs   Lab 07/24/23  0322 07/25/23  0428 07/26/23  0422   * 486* 415*       Microbiology:  Microbiology Results (last 7 days)       Procedure Component Value Units Date/Time    Blood culture [[id number]] Collected: 07/25/23 0451    Order Status: Completed Specimen: Blood Updated: 07/26/23 0613     Blood Culture, Routine No Growth to date      No Growth to date    Blood culture [[id number]] Collected: 07/25/23 0450    Order Status: Completed Specimen: Blood Updated: 07/26/23 0613     Blood Culture, Routine No Growth to date      No Growth to date    Culture, Anaerobe [[id number]] Collected: 07/22/23 0159    Order Status: Completed Specimen: Abscess from Abdomen Updated: 07/25/23 1134     Anaerobic Culture No anaerobes isolated    Blood culture [[id number]]  (Abnormal)  (Susceptibility) Collected: 07/22/23 1058    Order Status: Completed Specimen: Blood from Peripheral, Hand, Right Updated: 07/25/23 1014     Blood Culture, Routine Gram stain aer bottle: Gram positive cocci in clusters resembling Staph      Results called to and read back by:Zaheer Rae RN 07/23/2023  06:41      STAPHYLOCOCCUS AUREUS  ID consult required at [facility],[place] and [facility].      Blood culture [[id number]]  (Abnormal) Collected: 07/23/23 1305    Order Status: Completed Specimen: Blood from Line, Jugular, Internal Right Updated: 07/25/23 0919     Blood Culture, Routine Gram stain aer bottle: Gram positive cocci in  clusters resembling Staph      Results called to and read back by:Cong Urena RN 07/24/2023  16:05      STAPHYLOCOCCUS AUREUS  Susceptibility pending  ID consult required at [facility].On license of [facility],[place] and [facility] locations.      Aerobic culture [[id number]]  (Abnormal)  (Susceptibility) Collected: 07/22/23 0159    Order Status: Completed Specimen: Abscess from Abdomen Updated: 07/24/23 1113     Aerobic Bacterial Culture STAPHYLOCOCCUS AUREUS  Few      MRSA/SA Rapid ID by PCR from Blood culture [[id number]]  (Abnormal) Collected: 07/22/23 1058    Order Status: Completed Updated: 07/23/23 0557     Staph aureus ID by PCR Positive     Methicillin Resistant ID by PCR Negative    Blood culture [[id number]]     Order Status: Sent Specimen: Blood             I have reviewed all pertinent labs within the past 24 hours.    Estimated Creatinine Clearance: 92.1 mL/min (based on SCr of 1.3 mg/dL).    Diagnostic Results:  I have reviewed and interpreted all pertinent imaging results/findings within the past 24 hours.    Assessment and Plan:     Patient is a 38 yo black male with stage d HFrEF, NICM, ? 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 He underwent RVAD removal and chest closure 6/30/2022.  He was weaned off  but he had to restarted due to RVF. He was eventually transitioned to milrinone (secondary to  shortage) now on 0.25 mcg/kg/min.  Patient also has history of driveline infection.  Patient presented to the emergency room today because of pleuritic chest pain that has been going on for the last 3 days however it was more intense today.  Also complains of having shortness of breath and fevers associated with it.  He was spiking fever of 102 in the emergency room with elevated white blood cell count up to 17,000. He was treated with Zosyn.  He was sent here  for admission.  Patient was admitted a month back for COVID infection for which he received remdesivir for 3 days.  He is on doxycycline for chronic driveline infection.  He is on Milrinone for RV failure.  Patient denies having any low flow alarms on the pump.  Also denies having any lower extremity edema, increased discharge from his driveline site.  Patient has ground-glass opacities on imaging from outside hospital    * Severe sepsis  Patient presents with elevated fevers, white blood cell count, elevated lactic acid.    ID following. Possible Secondary to COVID-19 infection.    Continue:    1. Remdesivir for 5 days(day4).   2. Dexamethasone for 10 days(Day5).  History of MSSA infection of drive line.  Blood Cultures obtained on this admission are positive for MSSA as well.    Continue Cefazolin 6 grams IV q 24 hours by continuous infusion.      LVAD (left ventricular assist device) present  -HeartMate 3 Implanted on 6/29/2022 as DT with RV failure on milrinone at 0.25 mcg/kg/min  -Will need to replace pick.  -Continue Coumadin, Goal INR 2.0-3.0. Sub Therapeutic today. Con Heparin.   -LDH is stable.  Will continue to monitor daily.  -Speed set at 5100, LSL 4700 rpm      Procedure: Device Interrogation Including analysis of device parameters  Current Settings: Ventricular Assist Device  Review of device function is stable/unstable stable    TXP LVAD INTERROGATIONS 7/26/2023 7/26/2023 7/25/2023 7/25/2023 7/25/2023 7/25/2023 7/25/2023   Type HeartMate3 HeartMate3 HeartMate3 HeartMate3 HeartMate3 HeartMate3 HeartMate3   Flow 4.1 4.2 4.4 4.5 4.6 4.2 4.3   Speed 5100 5100 5100 5100 5150 5100 5150   PI 5.2 5.3 3.4 3.3 3.4 5.2 4.7   Power (Serna) 3.8 3.8 3.6 3.6 3.8 3.6 3.6   LSL 4700 4700 4700 4700 4700 4700 -   Low Flow Alarm - - - - - - -   High Power Alarm - - - - - - -   Pulsatility - Pulse No Pulse Intermittent pulse Intermittent pulse Intermittent pulse Intermittent pulse       Seizure-like activity  On on  Keppra    Type 2 diabetes mellitus with hyperglycemia  Will keep him on sliding scale and a.c. HS    Chronic combined systolic and diastolic heart failure  Status post LVAD.  On GDMT with valsartan and Aldactone    Fausto Reyes, NP  Heart Transplant  [facility] - Cardiology Stepdown

## 2023-07-26 NOTE — PLAN OF CARE
BG monitored. VSS. Continue oh heparin gtt, milrinone gtt an ancef gtt. LVAD number and doppler WNL. LVAD dressing changed  per protocol ( see note). Pt educated on fall risk and remained free from falls/trauma/injury. Denies chest pain, SOB, palpitations, dizziness, pain, or discomfort. Plan of care reviewed with pt, all questions answered. Bed locked in lowest position, call bell within reach, no acute distress noted, will continue to monitor.

## 2023-07-26 NOTE — PLAN OF CARE
Pt free of falls and injury. Pt denies any pain or discomfort. Pt AAOx4. Fall precautions remain in place. LVAD hum present and smooth. VAD numbers and dopplers WDL. DLES dressing CDI. Airborne, contact, droplet isolation maintained. BG monitored and insulin given. Lab drawn and CVP of 12 obtained. Plan of care reviewed with pt. Will continue to monitor pt.

## 2023-07-26 NOTE — PROGRESS NOTES
07/26/2023  Miracle Caballero    Current provider:  Natalya Villatoro MD    Device interrogation:  TXP LVAD INTERROGATIONS 7/26/2023 7/26/2023 7/25/2023 7/25/2023 7/25/2023 7/25/2023 7/25/2023   Type HeartMate3 HeartMate3 HeartMate3 HeartMate3 HeartMate3 HeartMate3 HeartMate3   Flow 4.1 4.2 4.4 4.5 4.6 4.2 4.3   Speed 5100 5100 5100 5100 5150 5100 5150   PI 5.2 5.3 3.4 3.3 3.4 5.2 4.7   Power (Serna) 3.8 3.8 3.6 3.6 3.8 3.6 3.6   LSL 4700 4700 4700 4700 4700 4700 -   Low Flow Alarm - - - - - - -   High Power Alarm - - - - - - -   Pulsatility - Pulse No Pulse Intermittent pulse Intermittent pulse Intermittent pulse Intermittent pulse          Rounded on Kevan Queen to ensure all mechanical assist device settings (IABP or VAD) were appropriate and all parameters were within limits.  I was able to ensure all back up equipment was present, the staff had no issues, and the Perfusion Department daily rounding was complete.      For implantable VADs: Interrogation of Ventricular assist device was performed with analysis of device parameters and review of device function. I have personally reviewed the interrogation findings and agree with findings as stated.     In emergency, the nursing units have been notified to contact the perfusion department either by:  Calling [id number] from 630am to 4pm Mon thru Fri, utilizing the On-Call Finder functionality of Epic and searching for Perfusion, or by contacting the hospital  from 4pm to 630am and on weekends and asking to speak with the perfusionist on call.    12:18 PM

## 2023-07-26 NOTE — ASSESSMENT & PLAN NOTE
BG goal 140-180    Increae Levemir to 18 units  BID (20% dose increase; fasting BG above goal ranges)   Increase Novolog to 16 units TID with meals (20% dose increase; prandial BG excursions noted)   Continue Moderate Dose Correction Scale  BG monitoring ac/hs    ** Please call Endocrine for any BG related issues **    Discharge plans: TBD    Lab Results   Component Value Date    HGBA1C 6.7 (H) 07/22/2023

## 2023-07-26 NOTE — NURSING
Notified GILDARDO Lambert pt coughed blood 2 small blood clot last night, and team aware. Pt cough 2 small blood clots during the day, notified showed Varunedu. MD at bedside and team aware.

## 2023-07-26 NOTE — NURSING
Pt coughed up a small blood clot, saved in the cup on bedside tray. HTS on-call MD notified, will come to bedside to assess.

## 2023-07-26 NOTE — CARE UPDATE
"RAPID RESPONSE NURSE CHART REVIEW        Chart Reviewed: 07/25/2023, 10:38 PM    MRN: [id number]  Bed: 314/314 A    Dx: Severe sepsis    Kevan Queen has a past medical history of Arthritis, Awareness alteration, transient, Cardiomyopathy, CHF (congestive heart failure), COVID-19, Diabetes mellitus, Dilated cardiomyopathy, Drug abuse, Headache, Hyperglycemia, Hyperosmolar hyperglycemic state (HHS), ICD (implantable cardioverter-defibrillator) in place, Left ventricular assist device (LVAD) complication, initial encounter, Muscle cramping, Renal disorder, SOB (shortness of breath), and Tingling in extremities.    Last VS: BP (!) 84/0 (BP Location: Left arm, Patient Position: Sitting)   Pulse (!) 115   Temp 97.4 °F (36.3 °C) (Temporal)   Resp (!) 22   Ht 6' 3" (1.905 m)   Wt 92.3 kg (203 lb 6 oz)   SpO2 (!) 92%   BMI 25.42 kg/m²     24H Vital Sign Range:  Temp:  [97.4 °F (36.3 °C)-98.3 °F (36.8 °C)]   Pulse:  [103-160]   Resp:  [18-22]   BP: ()/(0-76)   SpO2:  [90 %-97 %]     Level of Consciousness (AVPU): alert    Recent Labs     07/23/23  0829 07/24/23 0322 07/25/23 0428   WBC 34.88* 32.77* 29.77*  29.77*   HGB 10.6* 10.7* 10.3*  10.3*   HCT 31.4* 31.9* 31.4*  31.4*    460* 506*  506*       Recent Labs     07/23/23  0323 07/24/23  0040 07/24/23  0322 07/25/23  0428   * 131* 133* 132*   K 4.0 4.0 3.8 4.2   CL 88* 89* 90* 91*   CO2 29 28 30* 28   CREATININE 1.3 1.4 1.5* 1.3   * 208* 174* 220*   PHOS 2.9  --  3.7 3.3   MG 2.0 2.0 2.0 2.2        Recent Labs     07/23/23 2025   PO2 26*   POCSATURATED 48*        OXYGEN:  Flow (L/min): 2          MEWS score: 2    Charge RNHallie  contacted for tachycardia. No additional concerns verbalized at this time. Instructed to call [phone] for further concerns or assistance.    Monet Saucedo RN       "

## 2023-07-26 NOTE — ASSESSMENT & PLAN NOTE
Patient presents with elevated fevers, white blood cell count, elevated lactic acid.    ID following. Possible Secondary to COVID-19 infection.    Continue:    1. Remdesivir for 5 days(day4).   2. Dexamethasone for 10 days(Day5).  History of MSSA infection of drive line.  Blood Cultures obtained on this admission are positive for MSSA as well.    Continue Cefazolin 6 grams IV q 24 hours by continuous infusion.

## 2023-07-26 NOTE — ASSESSMENT & PLAN NOTE
History of MSSA infection of drive line.  Cultures obtained on this admission are positive for MSSA as well.      Plan      1. IV cefazolin.    2. Anticipate 6 weeks of therapy.

## 2023-07-26 NOTE — NURSING
"LVAD dressing change completed using sterile technique with dressing kit by RN. DLES is a "1" with no drainage noted on the drain sponge. Tolerated without any complication. no redness, or tenderness noted.    "

## 2023-07-26 NOTE — PROGRESS NOTES
[facility] - Cardiology Stepdown  Endocrinology  Progress Note    Admit Date: 7/22/2023     Reason for Consult: Management of T2DM, Hyperglycemia     Surgical Procedure and Date: HM3 implantation 6/23/2022    Diabetes diagnosis year:  2022    Home Diabetes Medications:  Levemir 22 units BID, Novolog 16 units TID with meals in addition to SSI (151-200/+1)     How often checking glucose at home? Freestyle William    BG readings on regimen: 170-low 200s  Hypoglycemia on the regimen?  No  Missed doses on regimen?  Yes    Diabetes Complications include:     Hyperglycemia     Complicating diabetes co morbidities:   History of MI, CHF, and CKD         HPI:   Patient is a 38 y.o. 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 He underwent RVAD removal and chest closure 6/30/2022.  He was weaned off  but he had to restarted due to RVF. He was eventually transitioned to milrinone (secondary to  shortage) now on 0.25 mcg/kg/min.  Patient also has history of driveline infection.  Patient presented to the emergency room today because of pleuritic chest pain that has been going on for the last 3 days however it was more intense today.  Also complains of having shortness of breath and fevers associated with it.  He was spiking fever of 102 in the emergency room with elevated white blood cell count up to 17,000. He was treated with Zosyn.  He was sent here for admission.  Patient was admitted a month back for COVID infection for which he received remdesivir for 3 days. Patient has ground-glass opacities on imaging from outside hospital. Endocrinology consulted for management of T2DM.      Lab Results   Component Value Date    HGBA1C 6.7 (H) 07/22/2023           Interval HPI:   Overnight events: Remains in CSU. BG above goal  "ranges on current SQ insulin regimen. Remains on Dex 6 mg daily (x 10 days). Diet Adult Regular (IDDSI Level 7) Fluid - 1500mL    Eatin%  Nausea: No  Hypoglycemia and intervention: No  Fever: No  TPN and/or TF: No  If yes, type of TF/TPN and rate: n/a    BP 94/73 (BP Location: Right arm, Patient Position: Lying)   Pulse 102   Temp 97 °F (36.1 °C) (Temporal)   Resp 18   Ht 6' 3" (1.905 m)   Wt 92.3 kg (203 lb 6 oz)   SpO2 (!) 94%   BMI 25.42 kg/m²     Labs Reviewed and Include    Recent Labs   Lab 23  0422   *   CALCIUM 9.0   ALBUMIN 2.1*   PROT 7.2   *   K 4.1   CO2 26   CL 93*   BUN 31*   CREATININE 1.3   ALKPHOS 143*   ALT 58*   AST 79*   BILITOT 1.0     Lab Results   Component Value Date    WBC 31.42 (H) 2023    HGB 10.3 (L) 2023    HCT 30.6 (L) 2023    MCV 80 (L) 2023     (H) 2023     No results for input(s): TSH, FREET4 in the last 168 hours.  Lab Results   Component Value Date    HGBA1C 6.7 (H) 2023       Nutritional status:   Body mass index is 25.42 kg/m².  Lab Results   Component Value Date    ALBUMIN 2.1 (L) 2023    ALBUMIN 2.1 (L) 2023    ALBUMIN 2.2 (L) 2023     Lab Results   Component Value Date    PREALBUMIN 14 (L) 2023    PREALBUMIN 9 (L) 2023    PREALBUMIN 17 (L) 2023       Estimated Creatinine Clearance: 92.1 mL/min (based on SCr of 1.3 mg/dL).    Accu-Checks  Recent Labs     23  1026 23  1635 23  1637 23  1639 23  2048 23  0817 23  1226 23  1615 23  0759   POCTGLUCOSE 213* >500* 258* 273* 225* 203* 237* 311* 297* 273*       Current Medications and/or Treatments Impacting Glycemic Control  Immunotherapy:    Immunosuppressants       None          Steroids:   Hormones (From admission, onward)      Start     Stop Route Frequency Ordered    23 1400  dexAMETHasone tablet 6 mg          0859 Oral Daily 23 1253 "          Pressors:    Autonomic Drugs (From admission, onward)      None          Hyperglycemia/Diabetes Medications:   Antihyperglycemics (From admission, onward)      Start     Stop Route Frequency Ordered    07/26/23 0900  insulin detemir U-100 (Levemir) pen 18 Units         -- SubQ 2 times daily 07/26/23 0833    07/26/23 0845  insulin aspart U-100 pen 14 Units         -- SubQ 3 times daily with meals 07/26/23 0832    07/22/23 1326  insulin aspart U-100 pen 1-10 Units         -- SubQ Before meals & nightly PRN 07/22/23 1226            ASSESSMENT and PLAN    Cardiac/Vascular  LVAD (left ventricular assist device) present  Managed per primary team  Avoid hypoglycemia        ID  * Severe sepsis  Managed per primary team        Endocrine  Type 2 diabetes mellitus with hyperglycemia  BG goal 140-180    Increae Levemir to 18 units  BID (20% dose increase; fasting BG above goal ranges)   Increase Novolog to 16 units TID with meals (20% dose increase; prandial BG excursions noted)   Continue Moderate Dose Correction Scale  BG monitoring ac/hs    ** Please call Endocrine for any BG related issues **    Discharge plans: TBD    Lab Results   Component Value Date    HGBA1C 6.7 (H) 07/22/2023                 Jody Starkey NP  Endocrinology  [facility] - [facility]iology Stepdown

## 2023-07-26 NOTE — PROGRESS NOTES
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  Cultures obtained on this admission are positive for MSSA as well.      Plan      1. IV cefazolin.    2. Anticipate 6 weeks of therapy.    Staphylococcus aureus bacteremia  38 year old male with a history of MSSA infection of his drive line exit site.  He is admitted with sepsis.  Blood cultures are now positive for MSSA.    Plan    1. Continue IV cefazolin.    2. Monitor repeat blood cultures.    3. Anticipate 6 weeks of antibiotics.        Anticipated Disposition: TBD    Thank you for your consult. I will follow-up with patient. Please contact us if you have any additional questions.    Benny Paul MD  Infectious Disease  [facility] Stepdown    Subjective:     Principal Problem:Severe sepsis    HPI: 38 year old male with a history of CHF s/p LVAD placement in June of 2022.  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  He was apparently on room air for most of that hospital stay.  He received 3 days of remdesivir.  He was discharged on oral doxycycline to complete a 14 day course for the MSSA drive line infection.  He had tested negative for COVID-19 following his treatment.  He is re-admitted with complaints of shortness of breath, chest pains and generalized ill feeling. COVID-19 testing is positive again.  Chest x-ray shows diffuse infiltrates bilaterally.  ID is consulted to assist with his management.      Interval History: No adverse events.    Review of Systems    Constitutional:  Positive for fatigue.   Respiratory:  Positive for shortness of breath.    Cardiovascular:  Positive for chest pain.   All other systems reviewed and are negative.  Objective:     Vital Signs (Most Recent):  Temp: 98.1 °F (36.7 °C) (07/25/23 1612)  Pulse: 108 (07/25/23 1612)  Resp: 20 (07/25/23 1612)  BP: (!) 78/0 (07/25/23 1505)  SpO2: (!) 93 % (07/25/23 1612) Vital Signs (24h Range):  Temp:  [97.8 °F (36.6 °C)-98.4 °F (36.9 °C)] 98.1 °F (36.7 °C)  Pulse:  [103-160] 108  Resp:  [18-20] 20  SpO2:  [90 %-97 %] 93 %  BP: ()/(0-76) 78/0     Weight: 92.3 kg (203 lb 6 oz)  Body mass index is 25.42 kg/m².    Estimated Creatinine Clearance: 92.1 mL/min (based on SCr of 1.3 mg/dL).     Physical Exam  Vitals and nursing note reviewed.   Constitutional:       Appearance: He is ill-appearing.   Eyes:      General: No scleral icterus.        Right eye: No discharge.         Left eye: No discharge.   Pulmonary:      Effort: No respiratory distress.   Abdominal:      General: There is no distension.      Comments: Dressings covering LVAD drive line exit site.   Lymphadenopathy:      Cervical: No cervical adenopathy.   Neurological:      General: No focal deficit present.      Mental Status: He is alert and oriented to person, place, and time.        Significant Labs:   Microbiology Results (last 7 days)       Procedure Component Value Units Date/Time    Blood culture [[id number]] Collected: 07/25/23 0451    Order Status: Completed Specimen: Blood Updated: 07/25/23 1145     Blood Culture, Routine No Growth to date    Blood culture [[id number]] Collected: 07/25/23 0450    Order Status: Completed Specimen: Blood Updated: 07/25/23 1145     Blood Culture, Routine No Growth to date    Culture, Anaerobe [[id number]] Collected: 07/22/23 0159    Order Status: Completed Specimen: Abscess from Abdomen Updated: 07/25/23 1134     Anaerobic Culture No anaerobes isolated    Blood culture [[id number]]  (Abnormal)   (Susceptibility) Collected: 07/22/23 1058    Order Status: Completed Specimen: Blood from Peripheral, Hand, Right Updated: 07/25/23 1014     Blood Culture, Routine Gram stain aer bottle: Gram positive cocci in clusters resembling Staph      Results called to and read back by:Zaheer Rae RN 07/23/2023  06:41      STAPHYLOCOCCUS AUREUS  ID consult required at [facility].      Blood culture [[id number]]  (Abnormal) Collected: 07/23/23 1305    Order Status: Completed Specimen: Blood from Line, Jugular, Internal Right Updated: 07/25/23 0919     Blood Culture, Routine Gram stain aer bottle: Gram positive cocci in clusters resembling Staph      Results called to and read back by:Cong Urena RN 07/24/2023  16:05      STAPHYLOCOCCUS AUREUS  Susceptibility pending  ID consult required at [facility].      Aerobic culture [[id number]]  (Abnormal)  (Susceptibility) Collected: 07/22/23 0159    Order Status: Completed Specimen: Abscess from Abdomen Updated: 07/24/23 1113     Aerobic Bacterial Culture STAPHYLOCOCCUS AUREUS  Few      MRSA/SA Rapid ID by PCR from Blood culture [[id number]]  (Abnormal) Collected: 07/22/23 1058    Order Status: Completed Updated: 07/23/23 0557     Staph aureus ID by PCR Positive     Methicillin Resistant ID by PCR Negative    Blood culture [[id number]]     Order Status: Sent Specimen: Blood             Significant Imaging: I have reviewed all pertinent imaging results/findings within the past 24 hours.

## 2023-07-26 NOTE — SUBJECTIVE & OBJECTIVE
Interval History: NAEON. Chest pain improving. On RA.     Continuous Infusions:   heparin (porcine) in D5W 22 Units/kg/hr (07/25/23 2207)    milrinone 20mg/100ml D5W (200mcg/ml) 0.25 mcg/kg/min (07/25/23 2010)     Scheduled Meds:   aspirin  81 mg Oral Daily    busPIRone  10 mg Oral BID    ceFAZolin(ANCEF) in D5W 500 mL CONTINUOUS INFUSION  6 g Intravenous Q24H    dexAMETHasone  6 mg Oral Daily    furosemide  80 mg Oral Daily    insulin aspart U-100  12 Units Subcutaneous TIDWM    insulin detemir U-100  15 Units Subcutaneous BID    levETIRAcetam  1,000 mg Oral BID    mirtazapine  15 mg Oral Nightly    polyethylene glycol  17 g Oral Daily    remdesivir infusion  100 mg Intravenous Daily    senna-docusate 8.6-50 mg  2 tablet Oral BID    warfarin  4 mg Oral Daily     PRN Meds:acetaminophen, dextrose 10%, dextrose 10%, glucagon (human recombinant), glucose, glucose, HYDROcodone-acetaminophen, insulin aspart U-100    Review of patient's allergies indicates:   Allergen Reactions    Bumex [bumetanide] Hives    Torsemide Hives     Objective:     Vital Signs (Most Recent):  Temp: 97 °F (36.1 °C) (07/26/23 0428)  Pulse: 102 (07/26/23 0604)  Resp: 18 (07/26/23 0428)  BP: 94/73 (07/26/23 0428)  SpO2: (!) 94 % (07/26/23 0428) Vital Signs (24h Range):  Temp:  [97 °F (36.1 °C)-98.3 °F (36.8 °C)] 97 °F (36.1 °C)  Pulse:  [] 102  Resp:  [18-22] 18  SpO2:  [90 %-96 %] 94 %  BP: ()/(0-73) 94/73     Patient Vitals for the past 72 hrs (Last 3 readings):   Weight   07/25/23 0746 92.3 kg (203 lb 6 oz)   07/24/23 0439 95.8 kg (211 lb 3.2 oz)   07/23/23 1006 98.7 kg (217 lb 9.5 oz)       Body mass index is 25.42 kg/m².      Intake/Output Summary (Last 24 hours) at 7/26/2023 0746  Last data filed at 7/26/2023 0430  Gross per 24 hour   Intake 880 ml   Output 1525 ml   Net -645 ml          Physical Exam  Constitutional:       Appearance: Normal appearance.   HENT:      Head: Normocephalic and atraumatic.      Mouth/Throat:       Mouth: Mucous membranes are moist.   Neck:      Comments: [facility] TLC placed 7/23  Cardiovascular:      Rate and Rhythm: Normal rate.      Comments: Normal VAD hum can be heard  Pulmonary:      Effort: Pulmonary effort is normal. No respiratory distress.      Breath sounds: Normal breath sounds. No wheezing or rales.   Abdominal:      General: Abdomen is flat.      Tenderness: There is no abdominal tenderness. There is no guarding.   Musculoskeletal:         General: No swelling.      Cervical back: Normal range of motion and neck supple.   Skin:     General: Skin is warm.      Capillary Refill: Capillary refill takes 2 to 3 seconds.   Neurological:      General: No focal deficit present.      Mental Status: He is alert and oriented to person, place, and time.   Psychiatric:         Mood and Affect: Mood normal.         Behavior: Behavior normal.          Significant Labs:  CBC:  Recent Labs   Lab 07/24/23 0322 07/25/23 0428 07/26/23 0422   WBC 32.77* 29.77*  29.77* 31.42*   RBC 3.99* 3.92*  3.92* 3.82*   HGB 10.7* 10.3*  10.3* 10.3*   HCT 31.9* 31.4*  31.4* 30.6*   * 506*  506* 555*   MCV 80* 80*  80* 80*   MCH 26.8* 26.3*  26.3* 27.0   MCHC 33.5 32.8  32.8 33.7       BNP:  Recent Labs   Lab 07/22/23  0248 07/24/23  0322 07/26/23 0422   * 348* 273*       CMP:  Recent Labs   Lab 07/22/23  0452 07/23/23  0323 07/24/23  0322 07/25/23  0428 07/26/23 0422   *   < > 174* 220* 369*   CALCIUM 8.8   < > 9.4 9.2 9.0   ALBUMIN 2.2*  --  2.1*  --  2.1*   PROT 7.3  --  7.4  --  7.2   *   < > 133* 132* 132*   K 4.0   < > 3.8 4.2 4.1   CO2 21*   < > 30* 28 26   CL 93*   < > 90* 91* 93*   BUN 21*   < > 36* 32* 31*   CREATININE 1.4   < > 1.5* 1.3 1.3   ALKPHOS 198*  --  158*  --  143*   ALT 45*  --  32  --  58*   AST 59*  --  47*  --  79*   BILITOT 1.4*  --  1.6*  --  1.0    < > = values in this interval not displayed.        Coagulation:   Recent Labs   Lab 07/24/23  0322 07/24/23  1024  07/25/23  0428 07/25/23  1238 07/25/23  1836 07/26/23  0422   INR 1.2  --  1.3*  --   --  1.5*   APTT 32.8*   < > 38.4*  38.4* 46.4* 47.4* 53.6*    < > = values in this interval not displayed.       LDH:  Recent Labs   Lab 07/24/23  0322 07/25/23  0428 07/26/23  0422   * 486* 415*       Microbiology:  Microbiology Results (last 7 days)       Procedure Component Value Units Date/Time    Blood culture [[id number]] Collected: 07/25/23 0451    Order Status: Completed Specimen: Blood Updated: 07/26/23 0613     Blood Culture, Routine No Growth to date      No Growth to date    Blood culture [[id number]] Collected: 07/25/23 0450    Order Status: Completed Specimen: Blood Updated: 07/26/23 0613     Blood Culture, Routine No Growth to date      No Growth to date    Culture, Anaerobe [[id number]] Collected: 07/22/23 0159    Order Status: Completed Specimen: Abscess from Abdomen Updated: 07/25/23 1134     Anaerobic Culture No anaerobes isolated    Blood culture [[id number]]  (Abnormal)  (Susceptibility) Collected: 07/22/23 1058    Order Status: Completed Specimen: Blood from Peripheral, Hand, Right Updated: 07/25/23 1014     Blood Culture, Routine Gram stain aer bottle: Gram positive cocci in clusters resembling Staph      Results called to and read back by:Zaheer Rae RN 07/23/2023  06:41      STAPHYLOCOCCUS AUREUS  [facility]      Blood culture [[id number]]  (Abnormal) Collected: 07/23/23 1305    Order Status: Completed Specimen: Blood from Line, Jugular, Internal Right Updated: 07/25/23 0919     Blood Culture, Routine Gram stain aer bottle: Gram positive cocci in clusters resembling Staph      Results called to and read back by:Cong Urena RN 07/24/2023  16:05      STAPHYLOCOCCUS AUREUS  Susceptibility pending  ID consult required at [facility].      Aerobic culture [[id number]]  (Abnormal)  (Susceptibility) Collected: 07/22/23 0159     Order Status: Completed Specimen: Abscess from Abdomen Updated: 07/24/23 1113     Aerobic Bacterial Culture STAPHYLOCOCCUS AUREUS  Few      MRSA/SA Rapid ID by PCR from Blood culture [[id number]]  (Abnormal) Collected: 07/22/23 1058    Order Status: Completed Updated: 07/23/23 0557     Staph aureus ID by PCR Positive     Methicillin Resistant ID by PCR Negative    Blood culture [[id number]]     Order Status: Sent Specimen: Blood             I have reviewed all pertinent labs within the past 24 hours.    Estimated Creatinine Clearance: 92.1 mL/min (based on SCr of 1.3 mg/dL).    Diagnostic Results:  I have reviewed and interpreted all pertinent imaging results/findings within the past 24 hours.

## 2023-07-26 NOTE — PROGRESS NOTES
Pt Hr 160 sustaining. Pt resting in bed, denies any symptoms. Stat ekg ordered. Fausto wood NP notified and at bedside to assess pt. Pt returned to baseline HR of 110s, prior to team arrival.      07/25/23 1453   Vital Signs   Pulse (!) 160   BP (!) 88/0   BP Location Right arm   BP Method Doppler   Patient Position Lying

## 2023-07-26 NOTE — SUBJECTIVE & OBJECTIVE
Interval History: No adverse events.    Review of Systems   Constitutional:  Positive for fatigue.   Respiratory:  Positive for shortness of breath.    Cardiovascular:  Positive for chest pain.   All other systems reviewed and are negative.  Objective:     Vital Signs (Most Recent):  Temp: 98.1 °F (36.7 °C) (07/25/23 1612)  Pulse: 108 (07/25/23 1612)  Resp: 20 (07/25/23 1612)  BP: (!) 78/0 (07/25/23 1505)  SpO2: (!) 93 % (07/25/23 1612) Vital Signs (24h Range):  Temp:  [97.8 °F (36.6 °C)-98.4 °F (36.9 °C)] 98.1 °F (36.7 °C)  Pulse:  [103-160] 108  Resp:  [18-20] 20  SpO2:  [90 %-97 %] 93 %  BP: ()/(0-76) 78/0     Weight: 92.3 kg (203 lb 6 oz)  Body mass index is 25.42 kg/m².    Estimated Creatinine Clearance: 92.1 mL/min (based on SCr of 1.3 mg/dL).     Physical Exam  Vitals and nursing note reviewed.   Constitutional:       Appearance: He is ill-appearing.   Eyes:      General: No scleral icterus.        Right eye: No discharge.         Left eye: No discharge.   Pulmonary:      Effort: No respiratory distress.   Abdominal:      General: There is no distension.      Comments: Dressings covering LVAD drive line exit site.   Lymphadenopathy:      Cervical: No cervical adenopathy.   Neurological:      General: No focal deficit present.      Mental Status: He is alert and oriented to person, place, and time.        Significant Labs:   Microbiology Results (last 7 days)       Procedure Component Value Units Date/Time    Blood culture [[id number]] Collected: 07/25/23 0451    Order Status: Completed Specimen: Blood Updated: 07/25/23 1145     Blood Culture, Routine No Growth to date    Blood culture [[id number]] Collected: 07/25/23 0450    Order Status: Completed Specimen: Blood Updated: 07/25/23 1145     Blood Culture, Routine No Growth to date    Culture, Anaerobe [[id number]] Collected: 07/22/23 0159    Order Status: Completed Specimen: Abscess from Abdomen Updated: 07/25/23 1134     Anaerobic Culture No anaerobes  isolated    Blood culture [[id number]]  (Abnormal)  (Susceptibility) Collected: 07/22/23 1058    Order Status: Completed Specimen: Blood from Peripheral, Hand, Right Updated: 07/25/23 1014     Blood Culture, Routine Gram stain aer bottle: Gram positive cocci in clusters resembling Staph      Results called to and read back by:Zaheer Rae RN 07/23/2023  06:41      STAPHYLOCOCCUS AUREUS  ID consult required at [facility].      Blood culture [[id number]]  (Abnormal) Collected: 07/23/23 1305    Order Status: Completed Specimen: Blood from Line, Jugular, Internal Right Updated: 07/25/23 0919     Blood Culture, Routine Gram stain aer bottle: Gram positive cocci in clusters resembling Staph      Results called to and read back by:Cong Urena RN 07/24/2023  16:05      STAPHYLOCOCCUS AUREUS  Susceptibility pending  ID consult required at [facility].      Aerobic culture [[id number]]  (Abnormal)  (Susceptibility) Collected: 07/22/23 0159    Order Status: Completed Specimen: Abscess from Abdomen Updated: 07/24/23 1113     Aerobic Bacterial Culture STAPHYLOCOCCUS AUREUS  Few      MRSA/SA Rapid ID by PCR from Blood culture [[id number]]  (Abnormal) Collected: 07/22/23 1058    Order Status: Completed Updated: 07/23/23 0557     Staph aureus ID by PCR Positive     Methicillin Resistant ID by PCR Negative    Blood culture [[id number]]     Order Status: Sent Specimen: Blood             Significant Imaging: I have reviewed all pertinent imaging results/findings within the past 24 hours.

## 2023-07-27 LAB
ANION GAP SERPL CALC-SCNC: 12 MMOL/L (ref 8–16)
ANION GAP SERPL CALC-SCNC: 13 MMOL/L (ref 8–16)
ANISOCYTOSIS BLD QL SMEAR: SLIGHT
APTT PPP: 50.5 SEC (ref 21–32)
APTT PPP: 55.5 SEC (ref 21–32)
APTT PPP: 71.5 SEC (ref 21–32)
BASOPHILS # BLD AUTO: 0.09 K/UL (ref 0–0.2)
BASOPHILS NFR BLD: 0.2 % (ref 0–1.9)
BUN SERPL-MCNC: 26 MG/DL (ref 6–20)
BUN SERPL-MCNC: 29 MG/DL (ref 6–20)
BURR CELLS BLD QL SMEAR: ABNORMAL
CALCIUM SERPL-MCNC: 8.9 MG/DL (ref 8.7–10.5)
CALCIUM SERPL-MCNC: 9.5 MG/DL (ref 8.7–10.5)
CHLORIDE SERPL-SCNC: 95 MMOL/L (ref 95–110)
CHLORIDE SERPL-SCNC: 95 MMOL/L (ref 95–110)
CO2 SERPL-SCNC: 28 MMOL/L (ref 23–29)
CO2 SERPL-SCNC: 29 MMOL/L (ref 23–29)
CREAT SERPL-MCNC: 1.2 MG/DL (ref 0.5–1.4)
CREAT SERPL-MCNC: 1.2 MG/DL (ref 0.5–1.4)
DIFFERENTIAL METHOD: ABNORMAL
EOSINOPHIL # BLD AUTO: 0 K/UL (ref 0–0.5)
EOSINOPHIL NFR BLD: 0 % (ref 0–8)
ERYTHROCYTE [DISTWIDTH] IN BLOOD BY AUTOMATED COUNT: 22.4 % (ref 11.5–14.5)
EST. GFR  (NO RACE VARIABLE): >60 ML/MIN/1.73 M^2
EST. GFR  (NO RACE VARIABLE): >60 ML/MIN/1.73 M^2
GLUCOSE SERPL-MCNC: 248 MG/DL (ref 70–110)
GLUCOSE SERPL-MCNC: 302 MG/DL (ref 70–110)
HCT VFR BLD AUTO: 28.3 % (ref 40–54)
HGB BLD-MCNC: 9.7 G/DL (ref 14–18)
IMM GRANULOCYTES # BLD AUTO: 1.32 K/UL (ref 0–0.04)
IMM GRANULOCYTES NFR BLD AUTO: 3.1 % (ref 0–0.5)
INR PPP: 1.8 (ref 0.8–1.2)
LDH SERPL L TO P-CCNC: 437 U/L (ref 110–260)
LYMPHOCYTES # BLD AUTO: 3.6 K/UL (ref 1–4.8)
LYMPHOCYTES NFR BLD: 8.6 % (ref 18–48)
MAGNESIUM SERPL-MCNC: 1.9 MG/DL (ref 1.6–2.6)
MAGNESIUM SERPL-MCNC: 2 MG/DL (ref 1.6–2.6)
MCH RBC QN AUTO: 27.2 PG (ref 27–31)
MCHC RBC AUTO-ENTMCNC: 34.3 G/DL (ref 32–36)
MCV RBC AUTO: 79 FL (ref 82–98)
MONOCYTES # BLD AUTO: 2.8 K/UL (ref 0.3–1)
MONOCYTES NFR BLD: 6.6 % (ref 4–15)
MRSA ID BY PCR: NEGATIVE
NEUTROPHILS # BLD AUTO: 34.2 K/UL (ref 1.8–7.7)
NEUTROPHILS NFR BLD: 81.5 % (ref 38–73)
NRBC BLD-RTO: 0 /100 WBC
OVALOCYTES BLD QL SMEAR: ABNORMAL
PHOSPHATE SERPL-MCNC: 3.5 MG/DL (ref 2.7–4.5)
PLATELET # BLD AUTO: 558 K/UL (ref 150–450)
PLATELET BLD QL SMEAR: ABNORMAL
PMV BLD AUTO: 9.7 FL (ref 9.2–12.9)
POCT GLUCOSE: 214 MG/DL (ref 70–110)
POCT GLUCOSE: 246 MG/DL (ref 70–110)
POCT GLUCOSE: 257 MG/DL (ref 70–110)
POCT GLUCOSE: 268 MG/DL (ref 70–110)
POCT GLUCOSE: 276 MG/DL (ref 70–110)
POCT GLUCOSE: 281 MG/DL (ref 70–110)
POIKILOCYTOSIS BLD QL SMEAR: SLIGHT
POTASSIUM SERPL-SCNC: 3.7 MMOL/L (ref 3.5–5.1)
POTASSIUM SERPL-SCNC: 4 MMOL/L (ref 3.5–5.1)
PROTHROMBIN TIME: 19.1 SEC (ref 9–12.5)
RBC # BLD AUTO: 3.57 M/UL (ref 4.6–6.2)
SCHISTOCYTES BLD QL SMEAR: ABNORMAL
SODIUM SERPL-SCNC: 135 MMOL/L (ref 136–145)
SODIUM SERPL-SCNC: 137 MMOL/L (ref 136–145)
SPHEROCYTES BLD QL SMEAR: ABNORMAL
STAPH AUREUS ID BY PCR: POSITIVE
TARGETS BLD QL SMEAR: ABNORMAL
TOXIC GRANULES BLD QL SMEAR: PRESENT
WBC # BLD AUTO: 41.97 K/UL (ref 3.9–12.7)

## 2023-07-27 PROCEDURE — 83735 ASSAY OF MAGNESIUM: CPT | Performed by: HOSPITALIST

## 2023-07-27 PROCEDURE — 99233 PR SUBSEQUENT HOSPITAL CARE,LEVL III: ICD-10-PCS | Mod: ,,, | Performed by: INTERNAL MEDICINE

## 2023-07-27 PROCEDURE — 25000003 PHARM REV CODE 250: Performed by: HOSPITALIST

## 2023-07-27 PROCEDURE — 93750 PR INTERROGATE VENT ASSIST DEV, IN PERSON, W PHYSICIAN ANALYSIS: ICD-10-PCS | Mod: ,,, | Performed by: INTERNAL MEDICINE

## 2023-07-27 PROCEDURE — 93750 INTERROGATION VAD IN PERSON: CPT | Mod: ,,, | Performed by: INTERNAL MEDICINE

## 2023-07-27 PROCEDURE — 20600001 HC STEP DOWN PRIVATE ROOM

## 2023-07-27 PROCEDURE — 63600175 PHARM REV CODE 636 W HCPCS: Performed by: INTERNAL MEDICINE

## 2023-07-27 PROCEDURE — 99232 SBSQ HOSP IP/OBS MODERATE 35: CPT | Mod: ,,, | Performed by: NURSE PRACTITIONER

## 2023-07-27 PROCEDURE — 85025 COMPLETE CBC W/AUTO DIFF WBC: CPT | Performed by: STUDENT IN AN ORGANIZED HEALTH CARE EDUCATION/TRAINING PROGRAM

## 2023-07-27 PROCEDURE — 63600175 PHARM REV CODE 636 W HCPCS: Performed by: NURSE PRACTITIONER

## 2023-07-27 PROCEDURE — 80048 BASIC METABOLIC PNL TOTAL CA: CPT | Mod: 91 | Performed by: NURSE PRACTITIONER

## 2023-07-27 PROCEDURE — 27000207 HC ISOLATION

## 2023-07-27 PROCEDURE — 94761 N-INVAS EAR/PLS OXIMETRY MLT: CPT

## 2023-07-27 PROCEDURE — 99233 SBSQ HOSP IP/OBS HIGH 50: CPT | Mod: ,,, | Performed by: INTERNAL MEDICINE

## 2023-07-27 PROCEDURE — 80048 BASIC METABOLIC PNL TOTAL CA: CPT | Performed by: HOSPITALIST

## 2023-07-27 PROCEDURE — 85730 THROMBOPLASTIN TIME PARTIAL: CPT | Mod: 91 | Performed by: INTERNAL MEDICINE

## 2023-07-27 PROCEDURE — 27000248 HC VAD-ADDITIONAL DAY

## 2023-07-27 PROCEDURE — 83735 ASSAY OF MAGNESIUM: CPT | Mod: 91 | Performed by: NURSE PRACTITIONER

## 2023-07-27 PROCEDURE — 85730 THROMBOPLASTIN TIME PARTIAL: CPT | Performed by: STUDENT IN AN ORGANIZED HEALTH CARE EDUCATION/TRAINING PROGRAM

## 2023-07-27 PROCEDURE — 83615 LACTATE (LD) (LDH) ENZYME: CPT | Performed by: HOSPITALIST

## 2023-07-27 PROCEDURE — 99233 SBSQ HOSP IP/OBS HIGH 50: CPT | Mod: ,,, | Performed by: NURSE PRACTITIONER

## 2023-07-27 PROCEDURE — 84100 ASSAY OF PHOSPHORUS: CPT | Performed by: HOSPITALIST

## 2023-07-27 PROCEDURE — 25000003 PHARM REV CODE 250: Performed by: NURSE PRACTITIONER

## 2023-07-27 PROCEDURE — 63600175 PHARM REV CODE 636 W HCPCS: Performed by: STUDENT IN AN ORGANIZED HEALTH CARE EDUCATION/TRAINING PROGRAM

## 2023-07-27 PROCEDURE — 99233 PR SUBSEQUENT HOSPITAL CARE,LEVL III: ICD-10-PCS | Mod: ,,, | Performed by: NURSE PRACTITIONER

## 2023-07-27 PROCEDURE — 25000003 PHARM REV CODE 250: Performed by: INTERNAL MEDICINE

## 2023-07-27 PROCEDURE — 63600175 PHARM REV CODE 636 W HCPCS: Performed by: HOSPITALIST

## 2023-07-27 PROCEDURE — 85610 PROTHROMBIN TIME: CPT | Performed by: HOSPITALIST

## 2023-07-27 PROCEDURE — 99900035 HC TECH TIME PER 15 MIN (STAT)

## 2023-07-27 PROCEDURE — 99232 PR SUBSEQUENT HOSPITAL CARE,LEVL II: ICD-10-PCS | Mod: ,,, | Performed by: NURSE PRACTITIONER

## 2023-07-27 RX ORDER — MUPIROCIN 20 MG/G
OINTMENT TOPICAL 2 TIMES DAILY
Status: DISPENSED | OUTPATIENT
Start: 2023-07-27 | End: 2023-08-01

## 2023-07-27 RX ORDER — FUROSEMIDE 80 MG/1
80 TABLET ORAL DAILY
Status: DISCONTINUED | OUTPATIENT
Start: 2023-07-28 | End: 2023-08-09 | Stop reason: HOSPADM

## 2023-07-27 RX ORDER — POTASSIUM CHLORIDE 20 MEQ/1
20 TABLET, EXTENDED RELEASE ORAL ONCE
Status: COMPLETED | OUTPATIENT
Start: 2023-07-27 | End: 2023-07-27

## 2023-07-27 RX ORDER — FUROSEMIDE 10 MG/ML
80 INJECTION INTRAMUSCULAR; INTRAVENOUS ONCE
Status: COMPLETED | OUTPATIENT
Start: 2023-07-27 | End: 2023-07-27

## 2023-07-27 RX ORDER — POTASSIUM CHLORIDE 20 MEQ/1
40 TABLET, EXTENDED RELEASE ORAL 2 TIMES DAILY
Status: DISCONTINUED | OUTPATIENT
Start: 2023-07-27 | End: 2023-07-30

## 2023-07-27 RX ORDER — INSULIN ASPART 100 [IU]/ML
20 INJECTION, SOLUTION INTRAVENOUS; SUBCUTANEOUS
Status: DISCONTINUED | OUTPATIENT
Start: 2023-07-27 | End: 2023-07-28

## 2023-07-27 RX ORDER — LANOLIN ALCOHOL/MO/W.PET/CERES
400 CREAM (GRAM) TOPICAL ONCE
Status: COMPLETED | OUTPATIENT
Start: 2023-07-27 | End: 2023-07-27

## 2023-07-27 RX ADMIN — INSULIN ASPART 4 UNITS: 100 INJECTION, SOLUTION INTRAVENOUS; SUBCUTANEOUS at 05:07

## 2023-07-27 RX ADMIN — INSULIN ASPART 16 UNITS: 100 INJECTION, SOLUTION INTRAVENOUS; SUBCUTANEOUS at 08:07

## 2023-07-27 RX ADMIN — WARFARIN SODIUM 4 MG: 4 TABLET ORAL at 05:07

## 2023-07-27 RX ADMIN — INSULIN ASPART 6 UNITS: 100 INJECTION, SOLUTION INTRAVENOUS; SUBCUTANEOUS at 08:07

## 2023-07-27 RX ADMIN — HYDROCODONE BITARTRATE AND ACETAMINOPHEN 1 TABLET: 5; 325 TABLET ORAL at 09:07

## 2023-07-27 RX ADMIN — DEXAMETHASONE 6 MG: 4 TABLET ORAL at 08:07

## 2023-07-27 RX ADMIN — HEPARIN SODIUM 22 UNITS/KG/HR: 10000 INJECTION, SOLUTION INTRAVENOUS at 12:07

## 2023-07-27 RX ADMIN — POTASSIUM CHLORIDE 40 MEQ: 1500 TABLET, EXTENDED RELEASE ORAL at 09:07

## 2023-07-27 RX ADMIN — ASPIRIN 81 MG: 81 TABLET, COATED ORAL at 08:07

## 2023-07-27 RX ADMIN — FUROSEMIDE 80 MG: 10 INJECTION, SOLUTION INTRAMUSCULAR; INTRAVENOUS at 08:07

## 2023-07-27 RX ADMIN — LEVETIRACETAM 1000 MG: 500 TABLET, FILM COATED ORAL at 09:07

## 2023-07-27 RX ADMIN — INSULIN ASPART 20 UNITS: 100 INJECTION, SOLUTION INTRAVENOUS; SUBCUTANEOUS at 05:07

## 2023-07-27 RX ADMIN — LEVETIRACETAM 1000 MG: 500 TABLET, FILM COATED ORAL at 08:07

## 2023-07-27 RX ADMIN — INSULIN DETEMIR 22 UNITS: 100 INJECTION, SOLUTION SUBCUTANEOUS at 09:07

## 2023-07-27 RX ADMIN — MILRINONE LACTATE 0.25 MCG/KG/MIN: 0.2 INJECTION, SOLUTION INTRAVENOUS at 01:07

## 2023-07-27 RX ADMIN — Medication 400 MG: at 04:07

## 2023-07-27 RX ADMIN — BUSPIRONE HYDROCHLORIDE 10 MG: 10 TABLET ORAL at 09:07

## 2023-07-27 RX ADMIN — INSULIN ASPART 20 UNITS: 100 INJECTION, SOLUTION INTRAVENOUS; SUBCUTANEOUS at 12:07

## 2023-07-27 RX ADMIN — MIRTAZAPINE 15 MG: 15 TABLET, FILM COATED ORAL at 09:07

## 2023-07-27 RX ADMIN — INSULIN ASPART 4 UNITS: 100 INJECTION, SOLUTION INTRAVENOUS; SUBCUTANEOUS at 12:07

## 2023-07-27 RX ADMIN — CEFAZOLIN 6 G: 2 INJECTION, POWDER, FOR SOLUTION INTRAMUSCULAR; INTRAVENOUS at 10:07

## 2023-07-27 RX ADMIN — SENNOSIDES AND DOCUSATE SODIUM 2 TABLET: 50; 8.6 TABLET ORAL at 08:07

## 2023-07-27 RX ADMIN — MILRINONE LACTATE 0.25 MCG/KG/MIN: 0.2 INJECTION, SOLUTION INTRAVENOUS at 04:07

## 2023-07-27 RX ADMIN — HEPARIN SODIUM 20 UNITS/KG/HR: 10000 INJECTION, SOLUTION INTRAVENOUS at 02:07

## 2023-07-27 RX ADMIN — BUSPIRONE HYDROCHLORIDE 10 MG: 10 TABLET ORAL at 08:07

## 2023-07-27 RX ADMIN — POTASSIUM CHLORIDE 20 MEQ: 1500 TABLET, EXTENDED RELEASE ORAL at 04:07

## 2023-07-27 NOTE — SUBJECTIVE & OBJECTIVE
Interval History: NAEON. WBC count up but pt with no complaints. Also having episodes of SVT per tele(also asymptomatic).     Continuous Infusions:   heparin (porcine) in D5W 20 Units/kg/hr (07/27/23 0636)    milrinone 20mg/100ml D5W (200mcg/ml) 0.25 mcg/kg/min (07/27/23 0118)     Scheduled Meds:   aspirin  81 mg Oral Daily    busPIRone  10 mg Oral BID    ceFAZolin(ANCEF) in D5W 500 mL CONTINUOUS INFUSION  6 g Intravenous Q24H    dexAMETHasone  6 mg Oral Daily    furosemide  80 mg Oral Daily    insulin aspart U-100  16 Units Subcutaneous TIDWM    insulin detemir U-100  18 Units Subcutaneous BID    levETIRAcetam  1,000 mg Oral BID    mirtazapine  15 mg Oral Nightly    polyethylene glycol  17 g Oral Daily    senna-docusate 8.6-50 mg  2 tablet Oral BID    warfarin  4 mg Oral Daily     PRN Meds:acetaminophen, dextrose 10%, dextrose 10%, glucagon (human recombinant), glucose, glucose, HYDROcodone-acetaminophen, insulin aspart U-100    Review of patient's allergies indicates:   Allergen Reactions    Bumex [bumetanide] Hives    Torsemide Hives     Objective:     Vital Signs (Most Recent):  Temp: 97 °F (36.1 °C) (07/27/23 0351)  Pulse: 104 (07/27/23 0550)  Resp: 20 (07/27/23 0351)  BP: (!) 90/0 (07/27/23 0351)  SpO2: 97 % (07/27/23 0351) Vital Signs (24h Range):  Temp:  [97 °F (36.1 °C)-98.5 °F (36.9 °C)] 97 °F (36.1 °C)  Pulse:  [] 104  Resp:  [17-22] 20  SpO2:  [92 %-97 %] 97 %  BP: ()/(0-72) 90/0     Patient Vitals for the past 72 hrs (Last 3 readings):   Weight   07/26/23 0750 91.9 kg (202 lb 9.6 oz)   07/25/23 0746 92.3 kg (203 lb 6 oz)       Body mass index is 25.32 kg/m².      Intake/Output Summary (Last 24 hours) at 7/27/2023 0750  Last data filed at 7/27/2023 0354  Gross per 24 hour   Intake 942 ml   Output 1400 ml   Net -458 ml          Physical Exam  Constitutional:       Appearance: Normal appearance.   HENT:      Head: Normocephalic and atraumatic.      Mouth/Throat:      Mouth: Mucous membranes are  moist.   Neck:      Comments: [facility] TLC placed 7/23  Cardiovascular:      Rate and Rhythm: Normal rate.      Comments: Normal VAD hum can be heard  Pulmonary:      Effort: Pulmonary effort is normal. No respiratory distress.      Breath sounds: Normal breath sounds. No wheezing or rales.   Abdominal:      General: Abdomen is flat.      Tenderness: There is no abdominal tenderness. There is no guarding.   Musculoskeletal:         General: No swelling.      Cervical back: Normal range of motion and neck supple.   Skin:     General: Skin is warm.      Capillary Refill: Capillary refill takes 2 to 3 seconds.   Neurological:      General: No focal deficit present.      Mental Status: He is alert and oriented to person, place, and time.   Psychiatric:         Mood and Affect: Mood normal.         Behavior: Behavior normal.          Significant Labs:  CBC:  Recent Labs   Lab 07/25/23 0428 07/26/23 0422 07/27/23  0401   WBC 29.77*  29.77* 31.42* 41.97*   RBC 3.92*  3.92* 3.82* 3.57*   HGB 10.3*  10.3* 10.3* 9.7*   HCT 31.4*  31.4* 30.6* 28.3*   *  506* 555* 558*   MCV 80*  80* 80* 79*   MCH 26.3*  26.3* 27.0 27.2   MCHC 32.8  32.8 33.7 34.3       BNP:  Recent Labs   Lab 07/22/23  0248 07/24/23  0322 07/26/23  0422   * 348* 273*       CMP:  Recent Labs   Lab 07/22/23  0452 07/23/23  0323 07/24/23  0322 07/25/23  0428 07/26/23  0422 07/27/23  0401   *   < > 174* 220* 369* 302*   CALCIUM 8.8   < > 9.4 9.2 9.0 8.9   ALBUMIN 2.2*  --  2.1*  --  2.1*  --    PROT 7.3  --  7.4  --  7.2  --    *   < > 133* 132* 132* 135*   K 4.0   < > 3.8 4.2 4.1 4.0   CO2 21*   < > 30* 28 26 28   CL 93*   < > 90* 91* 93* 95   BUN 21*   < > 36* 32* 31* 29*   CREATININE 1.4   < > 1.5* 1.3 1.3 1.2   ALKPHOS 198*  --  158*  --  143*  --    ALT 45*  --  32  --  58*  --    AST 59*  --  47*  --  79*  --    BILITOT 1.4*  --  1.6*  --  1.0  --     < > = values in this interval not displayed.        Coagulation:   Recent  Labs   Lab 07/25/23  0428 07/25/23  1238 07/25/23  1836 07/26/23  0422 07/27/23  0401   INR 1.3*  --   --  1.5* 1.8*   APTT 38.4*  38.4*   < > 47.4* 53.6* 71.5*    < > = values in this interval not displayed.       LDH:  Recent Labs   Lab 07/25/23  0428 07/26/23  0422 07/27/23  0401   * 415* 437*       Microbiology:  Microbiology Results (last 7 days)       Procedure Component Value Units Date/Time    Blood culture [[id number]] Collected: 07/25/23 0451    Order Status: Completed Specimen: Blood Updated: 07/27/23 0612     Blood Culture, Routine No Growth to date      No Growth to date      No Growth to date    Blood culture [[id number]] Collected: 07/25/23 0450    Order Status: Completed Specimen: Blood Updated: 07/27/23 0612     Blood Culture, Routine No Growth to date      No Growth to date      No Growth to date    Blood culture [[id number]]  (Abnormal)  (Susceptibility) Collected: 07/23/23 1305    Order Status: Completed Specimen: Blood from Line, Jugular, Internal Right Updated: 07/26/23 1120     Blood Culture, Routine Gram stain aer bottle: Gram positive cocci in clusters resembling Staph      Results called to and read back by:Cong Urena RN 07/24/2023  16:05      STAPHYLOCOCCUS AUREUS  ID consult required at [facility].      Culture, Anaerobe [[id number]] Collected: 07/22/23 0159    Order Status: Completed Specimen: Abscess from Abdomen Updated: 07/25/23 1134     Anaerobic Culture No anaerobes isolated    Blood culture [[id number]]  (Abnormal)  (Susceptibility) Collected: 07/22/23 1058    Order Status: Completed Specimen: Blood from Peripheral, Hand, Right Updated: 07/25/23 1014     Blood Culture, Routine Gram stain aer bottle: Gram positive cocci in clusters resembling Staph      Results called to and read back by:Zaheer Rae RN 07/23/2023  06:41      STAPHYLOCOCCUS AUREUS  ID consult required at [facility].      Aerobic culture [[id number]]   (Abnormal)  (Susceptibility) Collected: 07/22/23 0159    Order Status: Completed Specimen: Abscess from Abdomen Updated: 07/24/23 1113     Aerobic Bacterial Culture STAPHYLOCOCCUS AUREUS  Few      MRSA/SA Rapid ID by PCR from Blood culture [[id number]]  (Abnormal) Collected: 07/22/23 1058    Order Status: Completed Updated: 07/23/23 0557     Staph aureus ID by PCR Positive     Methicillin Resistant ID by PCR Negative    Blood culture [[id number]]     Order Status: Sent Specimen: Blood             I have reviewed all pertinent labs within the past 24 hours.    Estimated Creatinine Clearance: 99.8 mL/min (based on SCr of 1.2 mg/dL).    Diagnostic Results:  I have reviewed and interpreted all pertinent imaging results/findings within the past 24 hours.

## 2023-07-27 NOTE — ASSESSMENT & PLAN NOTE
BG goal 140-180    Increae Levemir to 22units  BID (20% dose increase; fasting BG above goal ranges) Home dosing   Increase Novolog to 20 units TID with meals (20% dose increase; prandial BG excursions noted)   Continue Moderate Dose Correction Scale  BG monitoring ac/hs    ** Please call Endocrine for any BG related issues **    Discharge plans: TBD    Lab Results   Component Value Date    HGBA1C 6.7 (H) 07/22/2023

## 2023-07-27 NOTE — PROGRESS NOTES
[facility] Cardiology Stepdown  Infectious Disease  Progress Note    Patient Name: Kevan Queen  MRN: [id number]  Admission Date: 7/22/2023  Length of Stay: 4 days  Attending Physician: Natalya Villatoro MD  Primary Care Provider: ORALIA Cline    Isolation Status: Airborne and Contact and Droplet  Assessment/Plan:      Pulmonary  Acute respiratory failure with hypoxia  Secondary to COVID-19 infection.  Episode of hemoptysis today noted.  Will     Plan    1. Remdesivir for 5 days.    2. Dexamethasone for 10 days.    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  Cultures obtained on this admission are positive for MSSA as well.      Plan      1. IV cefazolin.    2. Anticipate 6 weeks of therapy.    Staphylococcus aureus bacteremia  38 year old male with a history of MSSA infection of his drive line exit site.  He is admitted with sepsis.  Blood cultures are positive for MSSA.    Plan    1. Continue IV cefazolin.    2. Monitor repeat blood cultures.    3. Anticipate 6 weeks of antibiotics.        Anticipated Disposition: TBD    Thank you for your consult. I will follow-up with patient. Please contact us if you have any additional questions.    Benny Paul MD  Infectious Disease  [facility] - Cardiology Stepdown    Subjective:     Principal Problem:Severe sepsis    HPI: 38 year old male with a history of CHF s/p LVAD placement in June of 2022.  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  He was apparently on room air for most of that hospital stay.  He received 3 days of remdesivir.  He was discharged on oral doxycycline to complete a 14 day course for the MSSA drive line infection.  He had tested negative for COVID-19 following his treatment.  He is re-admitted with complaints of shortness of breath, chest pains and generalized ill feeling.  COVID-19 testing is positive again.  Chest x-ray shows diffuse infiltrates bilaterally.  ID is consulted to assist with his management.      Interval History: Feeling better today.  Had some hemoptysis.    Review of Systems   All other systems reviewed and are negative.  Objective:     Vital Signs (Most Recent):  Temp: 98.2 °F (36.8 °C) (07/26/23 2048)  Pulse: (!) 116 (07/26/23 2048)  Resp: (!) 22 (07/26/23 2127)  BP: (!) 86/0 (07/26/23 2305)  SpO2: 97 % (07/26/23 2048) Vital Signs (24h Range):  Temp:  [97 °F (36.1 °C)-98.5 °F (36.9 °C)] 98.2 °F (36.8 °C)  Pulse:  [] 116  Resp:  [18-22] 22  SpO2:  [94 %-97 %] 97 %  BP: ()/(0-73) 86/0     Weight: 91.9 kg (202 lb 9.6 oz)  Body mass index is 25.32 kg/m².    Estimated Creatinine Clearance: 92.1 mL/min (based on SCr of 1.3 mg/dL).     Physical Exam  Vitals and nursing note reviewed.   Constitutional:       General: He is not in acute distress.     Appearance: He is well-developed. He is not diaphoretic.   HENT:      Head: Normocephalic and atraumatic.      Right Ear: External ear normal.      Left Ear: External ear normal.      Nose: Nose normal.      Mouth/Throat:      Pharynx: No oropharyngeal exudate.   Eyes:      General: No scleral icterus.        Right eye: No discharge.         Left eye: No discharge.      Conjunctiva/sclera: Conjunctivae normal.      Pupils: Pupils are equal, round, and reactive to light.   Neck:      Thyroid: No thyromegaly.      Vascular: No JVD.      Trachea: No tracheal deviation.   Cardiovascular:      Comments: hum  Pulmonary:      Effort: Pulmonary effort is normal. No respiratory distress.      Breath sounds: Normal breath sounds. No stridor. No wheezing or rales.   Chest:      Chest wall: No tenderness.   Abdominal:      General: Bowel sounds are normal. There is no distension.      Palpations: Abdomen is soft. There is no mass.      Tenderness: There is no abdominal tenderness. There is no guarding or rebound.      Comments:  Drive line in place.   Musculoskeletal:         General: No tenderness. Normal range of motion.      Cervical back: Normal range of motion and neck supple.   Lymphadenopathy:      Cervical: No cervical adenopathy.   Skin:     General: Skin is warm.      Coloration: Skin is not pale.      Findings: No erythema or rash.   Neurological:      Mental Status: He is alert and oriented to person, place, and time.      Cranial Nerves: No cranial nerve deficit.      Motor: No abnormal muscle tone.      Coordination: Coordination normal.      Deep Tendon Reflexes: Reflexes are normal and symmetric. Reflexes normal.   Psychiatric:         Behavior: Behavior normal.         Thought Content: Thought content normal.         Judgment: Judgment normal.        Significant Labs:   Microbiology Results (last 7 days)       Procedure Component Value Units Date/Time    Blood culture [[id number]]  (Abnormal)  (Susceptibility) Collected: 07/23/23 1305    Order Status: Completed Specimen: Blood from Line, Jugular, Internal Right Updated: 07/26/23 1120     Blood Culture, Routine Gram stain aer bottle: Gram positive cocci in clusters resembling Staph      Results called to and read back by:Cong Urena RN 07/24/2023  16:05      STAPHYLOCOCCUS AUREUS  ID consult required at [facility],[place] and [facility] locations.      Blood culture [[id number]] Collected: 07/25/23 0451    Order Status: Completed Specimen: Blood Updated: 07/26/23 0613     Blood Culture, Routine No Growth to date      No Growth to date    Blood culture [[id number]] Collected: 07/25/23 0450    Order Status: Completed Specimen: Blood Updated: 07/26/23 0613     Blood Culture, Routine No Growth to date      No Growth to date    Culture, Anaerobe [[id number]] Collected: 07/22/23 0159    Order Status: Completed Specimen: Abscess from Abdomen Updated: 07/25/23 1134     Anaerobic Culture No anaerobes isolated    Blood culture [[id number]]  (Abnormal)  (Susceptibility) Collected: 07/22/23  1058    Order Status: Completed Specimen: Blood from Peripheral, Hand, Right Updated: 07/25/23 1014     Blood Culture, Routine Gram stain aer bottle: Gram positive cocci in clusters resembling Staph      Results called to and read back by:Zaheer Rae RN 07/23/2023  06:41      STAPHYLOCOCCUS AUREUS  ID consult required at [facility] access [facility],[place] and [facility] locations.      Aerobic culture [[id number]]  (Abnormal)  (Susceptibility) Collected: 07/22/23 0159    Order Status: Completed Specimen: Abscess from Abdomen Updated: 07/24/23 1113     Aerobic Bacterial Culture STAPHYLOCOCCUS AUREUS  Few      MRSA/SA Rapid ID by PCR from Blood culture [[id number]]  (Abnormal) Collected: 07/22/23 1058    Order Status: Completed Updated: 07/23/23 0557     Staph aureus ID by PCR Positive     Methicillin Resistant ID by PCR Negative    Blood culture [[id number]]     Order Status: Sent Specimen: Blood             Significant Imaging: I have reviewed all pertinent imaging results/findings within the past 24 hours.

## 2023-07-27 NOTE — NURSING
Pt's VAD dressing changed per protocol using kit and sterile technique. Pt's drive line site is little pink, intact with scant light green drainage at DLES and quarter size green drainage at inner layer of old dressing; DLES is + (2). No kinks or frays on drive line, secured with melendez anchor. Pt tolerated dressing change well. Next dressing change due 07/28/2023. Picture taken in epic media.

## 2023-07-27 NOTE — PLAN OF CARE
K and Mag replaced. BG monitored. VSS. Continue oh heparin gtt, milrinone gtt an ancef gtt. LVAD number and doppler WNL. LVAD dressing changed  per protocol ( see note). Pt educated on fall risk and remained free from falls/trauma/injury. Denies chest pain, SOB, palpitations, dizziness, pain, or discomfort. Plan of care reviewed with pt, all questions answered. Bed locked in lowest position, call bell within reach, no acute distress noted, will continue to monitor.

## 2023-07-27 NOTE — NURSING
Notified GILDARDO Reyes lab just called blood culture collected on 07/25/2023 is positive for staph.

## 2023-07-27 NOTE — PROGRESS NOTES
[facility] - Cardiology Stepdown  Endocrinology  Progress Note    Admit Date: 7/22/2023     Reason for Consult: Management of T2DM, Hyperglycemia     Surgical Procedure and Date: HM3 implantation 6/23/2022    Diabetes diagnosis year:  2022    Home Diabetes Medications:  Levemir 22 units BID, Novolog 16 units TID with meals in addition to SSI (151-200/+1)     How often checking glucose at home? Freestyle William    BG readings on regimen: 170-low 200s  Hypoglycemia on the regimen?  No  Missed doses on regimen?  Yes    Diabetes Complications include:     Hyperglycemia     Complicating diabetes co morbidities:   History of MI, CHF, and CKD         HPI:   Patient is a 38 y.o. 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 He underwent RVAD removal and chest closure 6/30/2022.  He was weaned off  but he had to restarted due to RVF. He was eventually transitioned to milrinone (secondary to  shortage) now on 0.25 mcg/kg/min.  Patient also has history of driveline infection.  Patient presented to the emergency room today because of pleuritic chest pain that has been going on for the last 3 days however it was more intense today.  Also complains of having shortness of breath and fevers associated with it.  He was spiking fever of 102 in the emergency room with elevated white blood cell count up to 17,000. He was treated with Zosyn.  He was sent here for admission.  Patient was admitted a month back for COVID infection for which he received remdesivir for 3 days. Patient has ground-glass opacities on imaging from outside hospital. Endocrinology consulted for management of T2DM.      Lab Results   Component Value Date    HGBA1C 6.7 (H) 07/22/2023           Interval HPI:   Overnight events: Remains in CSU. Covid  "Isolation. BG mostly above goal ranges on current SQ insulin regimen. Receiving Dex 6 mg x 10 days (Day 6). LVAD present. Diet Adult Regular (IDDSI Level 7) [facility]; Double Portions; Fluid - 1500mL    Eatin%  Nausea: No  Hypoglycemia and intervention: No  Fever: No  TPN and/or TF: No  If yes, type of TF/TPN and rate: n/a    BP 97/60 (BP Location: Right arm, Patient Position: Lying)   Pulse 99   Temp 97.8 °F (36.6 °C) (Oral)   Resp 18   Ht 6' 3" (1.905 m)   Wt 92.9 kg (204 lb 12.9 oz)   SpO2 99%   BMI 25.60 kg/m²     Labs Reviewed and Include    Recent Labs   Lab 23  0401   *   CALCIUM 8.9   *   K 4.0   CO2 28   CL 95   BUN 29*   CREATININE 1.2     Lab Results   Component Value Date    WBC 41.97 (H) 2023    HGB 9.7 (L) 2023    HCT 28.3 (L) 2023    MCV 79 (L) 2023     (H) 2023     No results for input(s): TSH, FREET4 in the last 168 hours.  Lab Results   Component Value Date    HGBA1C 6.7 (H) 2023       Nutritional status:   Body mass index is 25.6 kg/m².  Lab Results   Component Value Date    ALBUMIN 2.1 (L) 2023    ALBUMIN 2.1 (L) 2023    ALBUMIN 2.2 (L) 2023     Lab Results   Component Value Date    PREALBUMIN 14 (L) 2023    PREALBUMIN 9 (L) 2023    PREALBUMIN 17 (L) 2023       Estimated Creatinine Clearance: 99.8 mL/min (based on SCr of 1.2 mg/dL).    Accu-Checks  Recent Labs     23  0817 23  1226 23  1615 23  0759 23  1223 23  1612 23  0804   POCTGLUCOSE 225* 203* 237* 311* 297* 273* 259* 176* 269* 257*       Current Medications and/or Treatments Impacting Glycemic Control  Immunotherapy:    Immunosuppressants       None          Steroids:   Hormones (From admission, onward)      Start     Stop Route Frequency Ordered    23 1400  dexAMETHasone tablet 6 mg          0859 Oral Daily 23 1253 "          Pressors:    Autonomic Drugs (From admission, onward)      None          Hyperglycemia/Diabetes Medications:   Antihyperglycemics (From admission, onward)      Start     Stop Route Frequency Ordered    07/27/23 2100  insulin detemir U-100 (Levemir) pen 22 Units         -- SubQ 2 times daily 07/27/23 0834    07/27/23 1130  insulin aspart U-100 pen 20 Units         -- SubQ 3 times daily with meals 07/27/23 0834    07/22/23 1326  insulin aspart U-100 pen 1-10 Units         -- SubQ Before meals & nightly PRN 07/22/23 1226            ASSESSMENT and PLAN    Cardiac/Vascular  LVAD (left ventricular assist device) present  Managed per primary team  Avoid hypoglycemia        ID  * Severe sepsis  Managed per primary team        Endocrine  Type 2 diabetes mellitus with hyperglycemia  BG goal 140-180    Increae Levemir to 22units  BID (20% dose increase; fasting BG above goal ranges) Home dosing   Increase Novolog to 20 units TID with meals (20% dose increase; prandial BG excursions noted)   Continue Moderate Dose Correction Scale  BG monitoring ac/hs    ** Please call Endocrine for any BG related issues **    Discharge plans: TBD    Lab Results   Component Value Date    HGBA1C 6.7 (H) 07/22/2023                 Jody Starkey NP  Endocrinology  [facility] - Cardiology Stepdown

## 2023-07-27 NOTE — PROGRESS NOTES
07/27/2023  Mirela Chris    Current provider:  Natalya Villatoro MD    Device interrogation:  TXP LVAD INTERROGATIONS 7/27/2023 7/27/2023 7/27/2023 7/26/2023 7/26/2023 7/26/2023 7/26/2023   Type HeartMate3 HeartMate3 HeartMate3 HeartMate3 HeartMate3 HeartMate3 HeartMate3   Flow 3.9 4.2 4.1 4.2 4.3 4.3 4.0   Speed 5100 5100 5150 5100 5100 5100 5100   PI 6.1 4.4 4.3 5.2 3.5 4.8 5.8   Power (Serna) 3.8 3.7 3.8 3.9 3.6 3.8 3.6   LSL 4700 4700 4700 4700 4700 4700 4700   Low Flow Alarm - - - - - - -   High Power Alarm - - - - - - -   Pulsatility Pulse No Pulse Pulse - Pulse Pulse Pulse          Rounded on Kevan Queen to ensure all mechanical assist device settings (IABP or VAD) were appropriate and all parameters were within limits.  I was able to ensure all back up equipment was present, the staff had no issues, and the Perfusion Department daily rounding was complete.      For implantable VADs: Interrogation of Ventricular assist device was performed with analysis of device parameters and review of device function. I have personally reviewed the interrogation findings and agree with findings as stated.     In emergency, the nursing units have been notified to contact the perfusion department either by:  Calling [phone] from 630am to 4pm Mon thru Fri, utilizing the On-Call Finder functionality of Epic and searching for Perfusion, or by contacting the hospital  from 4pm to 630am and on weekends and asking to speak with the perfusionist on call.    9:18 AM

## 2023-07-27 NOTE — ASSESSMENT & PLAN NOTE
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  -Continue Coumadin, Goal INR 2.0-3.0. Sub Therapeutic today. INR 1.8. Cont Heparin.   -LDH is stable.  Will continue to monitor daily.  -Speed set at 5100, LSL 4700 rpm.    Procedure: Device Interrogation Including analysis of device parameters  Current Settings: Ventricular Assist Device  Review of device function is stable/unstable stable    TXP LVAD INTERROGATIONS 7/27/2023 7/27/2023 7/26/2023 7/26/2023 7/26/2023 7/26/2023 7/26/2023   Type HeartMate3 HeartMate3 HeartMate3 HeartMate3 HeartMate3 HeartMate3 HeartMate3   Flow 4.2 4.1 4.2 4.3 4.3 4.0 4.1   Speed 5100 5150 5100 5100 5100 5100 5100   PI 4.4 4.3 5.2 3.5 4.8 5.8 5.2   Power (Serna) 3.7 3.8 3.9 3.6 3.8 3.6 3.8   LSL 4700 4700 4700 4700 4700 4700 4700   Low Flow Alarm - - - - - - -   High Power Alarm - - - - - - -   Pulsatility No Pulse Pulse - Pulse Pulse Pulse -

## 2023-07-27 NOTE — ASSESSMENT & PLAN NOTE
Secondary to COVID-19 infection.  Episode of hemoptysis today noted.  Will     Plan    1. Remdesivir for 5 days.    2. Dexamethasone for 10 days.

## 2023-07-27 NOTE — ASSESSMENT & PLAN NOTE
38 year old male with a history of MSSA infection of his drive line exit site.  He is admitted with sepsis.  Blood cultures are positive for MSSA.    Plan    1. Continue IV cefazolin.    2. Monitor repeat blood cultures.    3. Anticipate 6 weeks of antibiotics.

## 2023-07-27 NOTE — SUBJECTIVE & OBJECTIVE
Interval History: Feeling better today.  Had some hemoptysis.    Review of Systems   All other systems reviewed and are negative.  Objective:     Vital Signs (Most Recent):  Temp: 98.2 °F (36.8 °C) (07/26/23 2048)  Pulse: (!) 116 (07/26/23 2048)  Resp: (!) 22 (07/26/23 2127)  BP: (!) 86/0 (07/26/23 2305)  SpO2: 97 % (07/26/23 2048) Vital Signs (24h Range):  Temp:  [97 °F (36.1 °C)-98.5 °F (36.9 °C)] 98.2 °F (36.8 °C)  Pulse:  [] 116  Resp:  [18-22] 22  SpO2:  [94 %-97 %] 97 %  BP: ()/(0-73) 86/0     Weight: 91.9 kg (202 lb 9.6 oz)  Body mass index is 25.32 kg/m².    Estimated Creatinine Clearance: 92.1 mL/min (based on SCr of 1.3 mg/dL).     Physical Exam  Vitals and nursing note reviewed.   Constitutional:       General: He is not in acute distress.     Appearance: He is well-developed. He is not diaphoretic.   HENT:      Head: Normocephalic and atraumatic.      Right Ear: External ear normal.      Left Ear: External ear normal.      Nose: Nose normal.      Mouth/Throat:      Pharynx: No oropharyngeal exudate.   Eyes:      General: No scleral icterus.        Right eye: No discharge.         Left eye: No discharge.      Conjunctiva/sclera: Conjunctivae normal.      Pupils: Pupils are equal, round, and reactive to light.   Neck:      Thyroid: No thyromegaly.      Vascular: No JVD.      Trachea: No tracheal deviation.   Cardiovascular:      Comments: hum  Pulmonary:      Effort: Pulmonary effort is normal. No respiratory distress.      Breath sounds: Normal breath sounds. No stridor. No wheezing or rales.   Chest:      Chest wall: No tenderness.   Abdominal:      General: Bowel sounds are normal. There is no distension.      Palpations: Abdomen is soft. There is no mass.      Tenderness: There is no abdominal tenderness. There is no guarding or rebound.      Comments: Drive line in place.   Musculoskeletal:         General: No tenderness. Normal range of motion.      Cervical back: Normal range of motion  and neck supple.   Lymphadenopathy:      Cervical: No cervical adenopathy.   Skin:     General: Skin is warm.      Coloration: Skin is not pale.      Findings: No erythema or rash.   Neurological:      Mental Status: He is alert and oriented to person, place, and time.      Cranial Nerves: No cranial nerve deficit.      Motor: No abnormal muscle tone.      Coordination: Coordination normal.      Deep Tendon Reflexes: Reflexes are normal and symmetric. Reflexes normal.   Psychiatric:         Behavior: Behavior normal.         Thought Content: Thought content normal.         Judgment: Judgment normal.        Significant Labs:   Microbiology Results (last 7 days)       Procedure Component Value Units Date/Time    Blood culture [[id number]]  (Abnormal)  (Susceptibility) Collected: 07/23/23 1305    Order Status: Completed Specimen: Blood from Line, Jugular, Internal Right Updated: 07/26/23 1120     Blood Culture, Routine Gram stain aer bottle: Gram positive cocci in clusters resembling Staph      Results called to and read back by:Cong Urena RN 07/24/2023  16:05      STAPHYLOCOCCUS AUREUS  ID consult required at [facility] and [facility].      Blood culture [[id number]] Collected: 07/25/23 0451    Order Status: Completed Specimen: Blood Updated: 07/26/23 0613     Blood Culture, Routine No Growth to date      No Growth to date    Blood culture [[id number]] Collected: 07/25/23 0450    Order Status: Completed Specimen: Blood Updated: 07/26/23 0613     Blood Culture, Routine No Growth to date      No Growth to date    Culture, Anaerobe [[id number]] Collected: 07/22/23 0159    Order Status: Completed Specimen: Abscess from Abdomen Updated: 07/25/23 1134     Anaerobic Culture No anaerobes isolated    Blood culture [[id number]]  (Abnormal)  (Susceptibility) Collected: 07/22/23 1058    Order Status: Completed Specimen: Blood from Peripheral, Hand, Right Updated: 07/25/23 1014     Blood Culture, Routine Gram stain  aer bottle: Gram positive cocci in clusters resembling Staph      Results called to and read back by:Zaheer Rae RN 07/23/2023  06:41      STAPHYLOCOCCUS AUREUS  ID consult required at [facility].      Aerobic culture [[id number]]  (Abnormal)  (Susceptibility) Collected: 07/22/23 0159    Order Status: Completed Specimen: Abscess from Abdomen Updated: 07/24/23 1113     Aerobic Bacterial Culture STAPHYLOCOCCUS AUREUS  Few      MRSA/SA Rapid ID by PCR from Blood culture [[id number]]  (Abnormal) Collected: 07/22/23 1058    Order Status: Completed Updated: 07/23/23 0557     Staph aureus ID by PCR Positive     Methicillin Resistant ID by PCR Negative    Blood culture [[id number]]     Order Status: Sent Specimen: Blood             Significant Imaging: I have reviewed all pertinent imaging results/findings within the past 24 hours.

## 2023-07-27 NOTE — ASSESSMENT & PLAN NOTE
Patient presents with elevated fevers, white blood cell count, elevated lactic acid.    ID following. Possible Secondary to COVID-19 infection.    Continue:    1. Remdesivir for 5 days(day5).   2. Dexamethasone for 10 days(Day6).  History of MSSA infection of drive line.  Blood Cultures obtained on this admission are positive for MSSA as well.    Continue Cefazolin 6 grams IV q 24 hours by continuous infusion.  Will need PICC if he requires home IV Abx.

## 2023-07-27 NOTE — PROGRESS NOTES
[facility] - Cardiology Stepdown  Heart Transplant  Progress Note    Patient Name: Kevan Queen  MRN: [id number]  Admission Date: 7/22/2023  Hospital Length of Stay: 5 days  Attending Physician: Natalya Villatoro MD  Primary Care Provider: ORALIA Cline  Principal Problem:Severe sepsis    Subjective:     Interval History: NAEON. WBC count up but pt with no complaints. Also having episodes of SVT per tele(also asymptomatic).     Continuous Infusions:   heparin (porcine) in D5W 20 Units/kg/hr (07/27/23 0636)    milrinone 20mg/100ml D5W (200mcg/ml) 0.25 mcg/kg/min (07/27/23 0118)     Scheduled Meds:   aspirin  81 mg Oral Daily    busPIRone  10 mg Oral BID    ceFAZolin(ANCEF) in D5W 500 mL CONTINUOUS INFUSION  6 g Intravenous Q24H    dexAMETHasone  6 mg Oral Daily    furosemide  80 mg Oral Daily    insulin aspart U-100  16 Units Subcutaneous TIDWM    insulin detemir U-100  18 Units Subcutaneous BID    levETIRAcetam  1,000 mg Oral BID    mirtazapine  15 mg Oral Nightly    polyethylene glycol  17 g Oral Daily    senna-docusate 8.6-50 mg  2 tablet Oral BID    warfarin  4 mg Oral Daily     PRN Meds:acetaminophen, dextrose 10%, dextrose 10%, glucagon (human recombinant), glucose, glucose, HYDROcodone-acetaminophen, insulin aspart U-100    Review of patient's allergies indicates:   Allergen Reactions    Bumex [bumetanide] Hives    Torsemide Hives     Objective:     Vital Signs (Most Recent):  Temp: 97 °F (36.1 °C) (07/27/23 0351)  Pulse: 104 (07/27/23 0550)  Resp: 20 (07/27/23 0351)  BP: (!) 90/0 (07/27/23 0351)  SpO2: 97 % (07/27/23 0351) Vital Signs (24h Range):  Temp:  [97 °F (36.1 °C)-98.5 °F (36.9 °C)] 97 °F (36.1 °C)  Pulse:  [] 104  Resp:  [17-22] 20  SpO2:  [92 %-97 %] 97 %  BP: ()/(0-72) 90/0     Patient Vitals for the past 72 hrs (Last 3 readings):   Weight   07/26/23 0750 91.9 kg (202 lb 9.6 oz)   07/25/23 0746 92.3 kg (203 lb 6 oz)       Body mass index is 25.32 kg/m².      Intake/Output Summary (Last  24 hours) at 7/27/2023 0750  Last data filed at 7/27/2023 0354  Gross per 24 hour   Intake 942 ml   Output 1400 ml   Net -458 ml          Physical Exam  Constitutional:       Appearance: Normal appearance.   HENT:      Head: Normocephalic and atraumatic.      Mouth/Throat:      Mouth: Mucous membranes are moist.   Neck:      Comments: RIJ TLC placed 7/23  Cardiovascular:      Rate and Rhythm: Normal rate.      Comments: Normal VAD hum can be heard  Pulmonary:      Effort: Pulmonary effort is normal. No respiratory distress.      Breath sounds: Normal breath sounds. No wheezing or rales.   Abdominal:      General: Abdomen is flat.      Tenderness: There is no abdominal tenderness. There is no guarding.   Musculoskeletal:         General: No swelling.      Cervical back: Normal range of motion and neck supple.   Skin:     General: Skin is warm.      Capillary Refill: Capillary refill takes 2 to 3 seconds.   Neurological:      General: No focal deficit present.      Mental Status: He is alert and oriented to person, place, and time.   Psychiatric:         Mood and Affect: Mood normal.         Behavior: Behavior normal.          Significant Labs:  CBC:  Recent Labs   Lab 07/25/23  0428 07/26/23 0422 07/27/23  0401   WBC 29.77*  29.77* 31.42* 41.97*   RBC 3.92*  3.92* 3.82* 3.57*   HGB 10.3*  10.3* 10.3* 9.7*   HCT 31.4*  31.4* 30.6* 28.3*   *  506* 555* 558*   MCV 80*  80* 80* 79*   MCH 26.3*  26.3* 27.0 27.2   MCHC 32.8  32.8 33.7 34.3       BNP:  Recent Labs   Lab 07/22/23  0248 07/24/23  0322 07/26/23  0422   * 348* 273*       CMP:  Recent Labs   Lab 07/22/23  0452 07/23/23  0323 07/24/23  0322 07/25/23  0428 07/26/23  0422 07/27/23  0401   *   < > 174* 220* 369* 302*   CALCIUM 8.8   < > 9.4 9.2 9.0 8.9   ALBUMIN 2.2*  --  2.1*  --  2.1*  --    PROT 7.3  --  7.4  --  7.2  --    *   < > 133* 132* 132* 135*   K 4.0   < > 3.8 4.2 4.1 4.0   CO2 21*   < > 30* 28 26 28   CL 93*   < > 90*  91* 93* 95   BUN 21*   < > 36* 32* 31* 29*   CREATININE 1.4   < > 1.5* 1.3 1.3 1.2   ALKPHOS 198*  --  158*  --  143*  --    ALT 45*  --  32  --  58*  --    AST 59*  --  47*  --  79*  --    BILITOT 1.4*  --  1.6*  --  1.0  --     < > = values in this interval not displayed.        Coagulation:   Recent Labs   Lab 07/25/23  0428 07/25/23  1238 07/25/23  1836 07/26/23  0422 07/27/23  0401   INR 1.3*  --   --  1.5* 1.8*   APTT 38.4*  38.4*   < > 47.4* 53.6* 71.5*    < > = values in this interval not displayed.       LDH:  Recent Labs   Lab 07/25/23  0428 07/26/23  0422 07/27/23  0401   * 415* 437*       Microbiology:  Microbiology Results (last 7 days)       Procedure Component Value Units Date/Time    Blood culture [[id number]] Collected: 07/25/23 0451    Order Status: Completed Specimen: Blood Updated: 07/27/23 0612     Blood Culture, Routine No Growth to date      No Growth to date      No Growth to date    Blood culture [[id number]] Collected: 07/25/23 0450    Order Status: Completed Specimen: Blood Updated: 07/27/23 0612     Blood Culture, Routine No Growth to date      No Growth to date      No Growth to date    Blood culture [[id number]]  (Abnormal)  (Susceptibility) Collected: 07/23/23 1305    Order Status: Completed Specimen: Blood from Line, Jugular, Internal Right Updated: 07/26/23 1120     Blood Culture, Routine Gram stain aer bottle: Gram positive cocci in clusters resembling Staph      Results called to and read back by:Cong Urena RN 07/24/2023  16:05      STAPHYLOCOCCUS AUREUS  ID consult required at [facility].Martha,Jeanette and Alexandra hilton.      Culture, Anaerobe [[id number]] Collected: 07/22/23 0159    Order Status: Completed Specimen: Abscess from Abdomen Updated: 07/25/23 1134     Anaerobic Culture No anaerobes isolated    Blood culture [[id number]]  (Abnormal)  (Susceptibility) Collected: 07/22/23 1058    Order Status: Completed Specimen: Blood from Peripheral, Hand, Right Updated: 07/25/23  1014     Blood Culture, Routine Gram stain aer bottle: Gram positive cocci in clusters resembling Staph      Results called to and read back by:Zaheer Rae RN 07/23/2023  06:41      STAPHYLOCOCCUS AUREUS  ID consult required at [facility].Count includes the [facility],[place] and [facility] locations.      Aerobic culture [[id number]]  (Abnormal)  (Susceptibility) Collected: 07/22/23 0159    Order Status: Completed Specimen: Abscess from Abdomen Updated: 07/24/23 1113     Aerobic Bacterial Culture STAPHYLOCOCCUS AUREUS  Few      MRSA/SA Rapid ID by PCR from Blood culture [[id number]]  (Abnormal) Collected: 07/22/23 1058    Order Status: Completed Updated: 07/23/23 0557     Staph aureus ID by PCR Positive     Methicillin Resistant ID by PCR Negative    Blood culture [[id number]]     Order Status: Sent Specimen: Blood             I have reviewed all pertinent labs within the past 24 hours.    Estimated Creatinine Clearance: 99.8 mL/min (based on SCr of 1.2 mg/dL).    Diagnostic Results:  I have reviewed and interpreted all pertinent imaging results/findings within the past 24 hours.    Assessment and Plan:     Patient is a 38 yo black male with stage d HFrEF, NICM, ? 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 He underwent RVAD removal and chest closure 6/30/2022.  He was weaned off  but he had to restarted due to RVF. He was eventually transitioned to milrinone (secondary to  shortage) now on 0.25 mcg/kg/min.  Patient also has history of driveline infection.  Patient presented to the emergency room today because of pleuritic chest pain that has been going on for the last 3 days however it was more intense today.  Also complains of having shortness of breath and fevers associated with it.  He was spiking fever of 102 in the emergency room with elevated white blood cell count up to  17,000. He was treated with Zosyn.  He was sent here for admission.  Patient was admitted a month back for COVID infection for which he received remdesivir for 3 days.  He is on doxycycline for chronic driveline infection.  He is on Milrinone for RV failure.  Patient denies having any low flow alarms on the pump.  Also denies having any lower extremity edema, increased discharge from his driveline site.  Patient has ground-glass opacities on imaging from outside hospital      * Severe sepsis  Patient presents with elevated fevers, white blood cell count, elevated lactic acid.        -WBC elevated today. Afebrile.   ID following. Possible Secondary to COVID-19 infection.    Continue:    1. Remdesivir for 5 days(day5).   2. Dexamethasone for 10 days(Day6).  History of MSSA infection of drive line.  Blood Cultures obtained on this admission are positive for MSSA as well.    Continue Cefazolin 6 grams IV q 24 hours by continuous infusion.  Will need PICC if he requires home IV Abx.     LVAD (left ventricular assist device) present  -HeartMate 3 Implanted on 6/29/2022 as DT with RV failure on milrinone at 0.25 mcg/kg/min.  -Continue Coumadin, Goal INR 2.0-3.0. Sub Therapeutic today. INR 1.8. Cont Heparin.  -LDH is stable.  Will continue to monitor daily.  -Speed set at 5100, LSL 4700 rpm.    Procedure: Device Interrogation Including analysis of device parameters  Current Settings: Ventricular Assist Device  Review of device function is stable/unstable stable    TXP LVAD INTERROGATIONS 7/27/2023 7/27/2023 7/26/2023 7/26/2023 7/26/2023 7/26/2023 7/26/2023   Type HeartMate3 HeartMate3 HeartMate3 HeartMate3 HeartMate3 HeartMate3 HeartMate3   Flow 4.2 4.1 4.2 4.3 4.3 4.0 4.1   Speed 5100 5150 5100 5100 5100 5100 5100   PI 4.4 4.3 5.2 3.5 4.8 5.8 5.2   Power (Serna) 3.7 3.8 3.9 3.6 3.8 3.6 3.8   LSL 4700 4700 4700 4700 4700 4700 4700   Low Flow Alarm - - - - - - -   High Power Alarm - - - - - - -   Pulsatility No Pulse Pulse -  Pulse Pulse Pulse -       Seizure-like activity  On on Keppra    Type 2 diabetes mellitus with hyperglycemia  Will keep him on sliding scale and a.c. HS    Chronic combined systolic and diastolic heart failure  -Status post LVAD.  -On GDMT with valsartan and Aldactone  -CVP rising slowly. Will give 1 dose of IVP Lasix this am and follow.   -Continue home Milrinone 0.25mcg/kg/mn.    Fausto Reyes, NP  Heart Transplant  [facility] - Cardiology Stepdown

## 2023-07-27 NOTE — PLAN OF CARE
Pt free of falls and injury. Pt denies any pain or discomfort. Pt AAOx4. Fall precautions remain in place. LVAD hum present and smooth. VAD numbers and dopplers WDL. DLES dressing CDI. Airborne, contact, droplet isolation maintained. BG monitored and insulin given. Lab drawn and CVP of 13 obtained. Milrinone going at 0.25 mcg/kg/min, Hep going at 22 units/kg/hr. Ancef going at 20.8 mL/hr. Plan of care reviewed with pt. Will continue to monitor pt.

## 2023-07-27 NOTE — SUBJECTIVE & OBJECTIVE
"Interval HPI:   Overnight events: Remains in CSU. Covid Isolation. BG mostly above goal ranges on current SQ insulin regimen. Receiving Dex 6 mg x 10 days (Day 6). LVAD present. Diet Adult Regular (IDDSI Level 7) [facility]; Double Portions; Fluid - 1500mL    Eatin%  Nausea: No  Hypoglycemia and intervention: No  Fever: No  TPN and/or TF: No  If yes, type of TF/TPN and rate: n/a    BP 97/60 (BP Location: Right arm, Patient Position: Lying)   Pulse 99   Temp 97.8 °F (36.6 °C) (Oral)   Resp 18   Ht 6' 3" (1.905 m)   Wt 92.9 kg (204 lb 12.9 oz)   SpO2 99%   BMI 25.60 kg/m²     Labs Reviewed and Include    Recent Labs   Lab 23  0401   *   CALCIUM 8.9   *   K 4.0   CO2 28   CL 95   BUN 29*   CREATININE 1.2     Lab Results   Component Value Date    WBC 41.97 (H) 2023    HGB 9.7 (L) 2023    HCT 28.3 (L) 2023    MCV 79 (L) 2023     (H) 2023     No results for input(s): TSH, FREET4 in the last 168 hours.  Lab Results   Component Value Date    HGBA1C 6.7 (H) 2023       Nutritional status:   Body mass index is 25.6 kg/m².  Lab Results   Component Value Date    ALBUMIN 2.1 (L) 2023    ALBUMIN 2.1 (L) 2023    ALBUMIN 2.2 (L) 2023     Lab Results   Component Value Date    PREALBUMIN 14 (L) 2023    PREALBUMIN 9 (L) 2023    PREALBUMIN 17 (L) 2023       Estimated Creatinine Clearance: 99.8 mL/min (based on SCr of 1.2 mg/dL).    Accu-Checks  Recent Labs     23  0817 23  1226 23  1615 23  0759 23  1223 23  1612 231 23  0804   POCTGLUCOSE 225* 203* 237* 311* 297* 273* 259* 176* 269* 257*       Current Medications and/or Treatments Impacting Glycemic Control  Immunotherapy:    Immunosuppressants       None          Steroids:   Hormones (From admission, onward)      Start     Stop Route Frequency Ordered    23 1400  dexAMETHasone " tablet 6 mg         08/01 0859 Oral Daily 07/22/23 1253          Pressors:    Autonomic Drugs (From admission, onward)      None          Hyperglycemia/Diabetes Medications:   Antihyperglycemics (From admission, onward)      Start     Stop Route Frequency Ordered    07/27/23 2100  insulin detemir U-100 (Levemir) pen 22 Units         -- SubQ 2 times daily 07/27/23 0834    07/27/23 1130  insulin aspart U-100 pen 20 Units         -- SubQ 3 times daily with meals 07/27/23 0834    07/22/23 1326  insulin aspart U-100 pen 1-10 Units         -- SubQ Before meals & nightly PRN 07/22/23 1226

## 2023-07-28 PROBLEM — A41.9 SEVERE SEPSIS: Status: ACTIVE | Noted: 2023-07-28

## 2023-07-28 PROBLEM — R07.9 CHEST PAIN: Status: ACTIVE | Noted: 2023-07-28

## 2023-07-28 PROBLEM — R65.20 SEVERE SEPSIS: Status: ACTIVE | Noted: 2023-07-28

## 2023-07-28 LAB
ALBUMIN SERPL BCP-MCNC: 2.2 G/DL (ref 3.5–5.2)
ALP SERPL-CCNC: 121 U/L (ref 55–135)
ALT SERPL W/O P-5'-P-CCNC: 30 U/L (ref 10–44)
ANION GAP SERPL CALC-SCNC: 10 MMOL/L (ref 8–16)
ANISOCYTOSIS BLD QL SMEAR: SLIGHT
APTT PPP: 77.1 SEC (ref 21–32)
AST SERPL-CCNC: 32 U/L (ref 10–40)
BASOPHILS # BLD AUTO: ABNORMAL K/UL (ref 0–0.2)
BASOPHILS NFR BLD: 0 % (ref 0–1.9)
BILIRUB DIRECT SERPL-MCNC: 0.4 MG/DL (ref 0.1–0.3)
BILIRUB SERPL-MCNC: 0.7 MG/DL (ref 0.1–1)
BNP SERPL-MCNC: 337 PG/ML (ref 0–99)
BUN SERPL-MCNC: 29 MG/DL (ref 6–20)
BURR CELLS BLD QL SMEAR: ABNORMAL
CALCIUM SERPL-MCNC: 9.4 MG/DL (ref 8.7–10.5)
CHLORIDE SERPL-SCNC: 97 MMOL/L (ref 95–110)
CO2 SERPL-SCNC: 28 MMOL/L (ref 23–29)
CREAT SERPL-MCNC: 1.2 MG/DL (ref 0.5–1.4)
CRP SERPL-MCNC: 60.7 MG/L (ref 0–8.2)
DIFFERENTIAL METHOD: ABNORMAL
EOSINOPHIL # BLD AUTO: ABNORMAL K/UL (ref 0–0.5)
EOSINOPHIL NFR BLD: 0 % (ref 0–8)
ERYTHROCYTE [DISTWIDTH] IN BLOOD BY AUTOMATED COUNT: 22.9 % (ref 11.5–14.5)
EST. GFR  (NO RACE VARIABLE): >60 ML/MIN/1.73 M^2
GLUCOSE SERPL-MCNC: 248 MG/DL (ref 70–110)
HCT VFR BLD AUTO: 27.3 % (ref 40–54)
HGB BLD-MCNC: 9.2 G/DL (ref 14–18)
HYPOCHROMIA BLD QL SMEAR: ABNORMAL
IMM GRANULOCYTES # BLD AUTO: ABNORMAL K/UL (ref 0–0.04)
IMM GRANULOCYTES NFR BLD AUTO: ABNORMAL % (ref 0–0.5)
INR PPP: 2 (ref 0.8–1.2)
LDH SERPL L TO P-CCNC: 418 U/L (ref 110–260)
LYMPHOCYTES # BLD AUTO: ABNORMAL K/UL (ref 1–4.8)
LYMPHOCYTES NFR BLD: 10 % (ref 18–48)
MAGNESIUM SERPL-MCNC: 1.9 MG/DL (ref 1.6–2.6)
MCH RBC QN AUTO: 27.2 PG (ref 27–31)
MCHC RBC AUTO-ENTMCNC: 33.7 G/DL (ref 32–36)
MCV RBC AUTO: 81 FL (ref 82–98)
MONOCYTES # BLD AUTO: ABNORMAL K/UL (ref 0.3–1)
MONOCYTES NFR BLD: 3 % (ref 4–15)
MYELOCYTES NFR BLD MANUAL: 2 %
NEUTROPHILS NFR BLD: 85 % (ref 38–73)
NRBC BLD-RTO: 0 /100 WBC
OVALOCYTES BLD QL SMEAR: ABNORMAL
PHOSPHATE SERPL-MCNC: 3.4 MG/DL (ref 2.7–4.5)
PLATELET # BLD AUTO: 549 K/UL (ref 150–450)
PLATELET BLD QL SMEAR: ABNORMAL
PMV BLD AUTO: 9.5 FL (ref 9.2–12.9)
POCT GLUCOSE: 149 MG/DL (ref 70–110)
POCT GLUCOSE: 219 MG/DL (ref 70–110)
POCT GLUCOSE: 262 MG/DL (ref 70–110)
POCT GLUCOSE: 282 MG/DL (ref 70–110)
POIKILOCYTOSIS BLD QL SMEAR: SLIGHT
POLYCHROMASIA BLD QL SMEAR: ABNORMAL
POTASSIUM SERPL-SCNC: 3.9 MMOL/L (ref 3.5–5.1)
PREALB SERPL-MCNC: 18 MG/DL (ref 20–43)
PROT SERPL-MCNC: 6.7 G/DL (ref 6–8.4)
PROTHROMBIN TIME: 20.6 SEC (ref 9–12.5)
RBC # BLD AUTO: 3.38 M/UL (ref 4.6–6.2)
SODIUM SERPL-SCNC: 135 MMOL/L (ref 136–145)
TARGETS BLD QL SMEAR: ABNORMAL
WBC # BLD AUTO: 42.35 K/UL (ref 3.9–12.7)

## 2023-07-28 PROCEDURE — 80076 HEPATIC FUNCTION PANEL: CPT | Performed by: HOSPITALIST

## 2023-07-28 PROCEDURE — 83735 ASSAY OF MAGNESIUM: CPT | Performed by: HOSPITALIST

## 2023-07-28 PROCEDURE — 86140 C-REACTIVE PROTEIN: CPT | Performed by: HOSPITALIST

## 2023-07-28 PROCEDURE — 99233 SBSQ HOSP IP/OBS HIGH 50: CPT | Mod: 95,,, | Performed by: NURSE PRACTITIONER

## 2023-07-28 PROCEDURE — 87077 CULTURE AEROBIC IDENTIFY: CPT | Performed by: INTERNAL MEDICINE

## 2023-07-28 PROCEDURE — 87040 BLOOD CULTURE FOR BACTERIA: CPT | Performed by: INTERNAL MEDICINE

## 2023-07-28 PROCEDURE — 85027 COMPLETE CBC AUTOMATED: CPT | Performed by: STUDENT IN AN ORGANIZED HEALTH CARE EDUCATION/TRAINING PROGRAM

## 2023-07-28 PROCEDURE — 25000003 PHARM REV CODE 250: Performed by: NURSE PRACTITIONER

## 2023-07-28 PROCEDURE — 93750 PR INTERROGATE VENT ASSIST DEV, IN PERSON, W PHYSICIAN ANALYSIS: ICD-10-PCS | Mod: ,,, | Performed by: INTERNAL MEDICINE

## 2023-07-28 PROCEDURE — 27000248 HC VAD-ADDITIONAL DAY

## 2023-07-28 PROCEDURE — 25000003 PHARM REV CODE 250: Performed by: INTERNAL MEDICINE

## 2023-07-28 PROCEDURE — 99233 PR SUBSEQUENT HOSPITAL CARE,LEVL III: ICD-10-PCS | Mod: 95,,, | Performed by: NURSE PRACTITIONER

## 2023-07-28 PROCEDURE — 80048 BASIC METABOLIC PNL TOTAL CA: CPT | Performed by: HOSPITALIST

## 2023-07-28 PROCEDURE — 27000685 HC LVAD KIT (30 DAY SUPPLY)

## 2023-07-28 PROCEDURE — 85730 THROMBOPLASTIN TIME PARTIAL: CPT | Performed by: STUDENT IN AN ORGANIZED HEALTH CARE EDUCATION/TRAINING PROGRAM

## 2023-07-28 PROCEDURE — 63600175 PHARM REV CODE 636 W HCPCS: Performed by: STUDENT IN AN ORGANIZED HEALTH CARE EDUCATION/TRAINING PROGRAM

## 2023-07-28 PROCEDURE — 99233 SBSQ HOSP IP/OBS HIGH 50: CPT | Mod: ,,, | Performed by: INTERNAL MEDICINE

## 2023-07-28 PROCEDURE — 27000207 HC ISOLATION

## 2023-07-28 PROCEDURE — 25000003 PHARM REV CODE 250: Performed by: HOSPITALIST

## 2023-07-28 PROCEDURE — 87186 SC STD MICRODIL/AGAR DIL: CPT | Performed by: INTERNAL MEDICINE

## 2023-07-28 PROCEDURE — 99233 PR SUBSEQUENT HOSPITAL CARE,LEVL III: ICD-10-PCS | Mod: ,,, | Performed by: INTERNAL MEDICINE

## 2023-07-28 PROCEDURE — 63600175 PHARM REV CODE 636 W HCPCS: Performed by: INTERNAL MEDICINE

## 2023-07-28 PROCEDURE — 63600175 PHARM REV CODE 636 W HCPCS: Performed by: HOSPITALIST

## 2023-07-28 PROCEDURE — 20600001 HC STEP DOWN PRIVATE ROOM

## 2023-07-28 PROCEDURE — 93750 INTERROGATION VAD IN PERSON: CPT | Mod: ,,, | Performed by: INTERNAL MEDICINE

## 2023-07-28 PROCEDURE — 83880 ASSAY OF NATRIURETIC PEPTIDE: CPT | Performed by: HOSPITALIST

## 2023-07-28 PROCEDURE — 63600175 PHARM REV CODE 636 W HCPCS: Performed by: NURSE PRACTITIONER

## 2023-07-28 PROCEDURE — 84134 ASSAY OF PREALBUMIN: CPT | Performed by: HOSPITALIST

## 2023-07-28 PROCEDURE — 36415 COLL VENOUS BLD VENIPUNCTURE: CPT | Performed by: INTERNAL MEDICINE

## 2023-07-28 PROCEDURE — 85610 PROTHROMBIN TIME: CPT | Performed by: HOSPITALIST

## 2023-07-28 PROCEDURE — 83615 LACTATE (LD) (LDH) ENZYME: CPT | Performed by: HOSPITALIST

## 2023-07-28 PROCEDURE — 85007 BL SMEAR W/DIFF WBC COUNT: CPT | Performed by: STUDENT IN AN ORGANIZED HEALTH CARE EDUCATION/TRAINING PROGRAM

## 2023-07-28 PROCEDURE — 84100 ASSAY OF PHOSPHORUS: CPT | Performed by: HOSPITALIST

## 2023-07-28 RX ORDER — LANOLIN ALCOHOL/MO/W.PET/CERES
400 CREAM (GRAM) TOPICAL ONCE
Status: COMPLETED | OUTPATIENT
Start: 2023-07-28 | End: 2023-07-28

## 2023-07-28 RX ORDER — INSULIN ASPART 100 [IU]/ML
22 INJECTION, SOLUTION INTRAVENOUS; SUBCUTANEOUS
Status: DISCONTINUED | OUTPATIENT
Start: 2023-07-28 | End: 2023-07-29

## 2023-07-28 RX ADMIN — Medication 400 MG: at 09:07

## 2023-07-28 RX ADMIN — DEXAMETHASONE 6 MG: 4 TABLET ORAL at 09:07

## 2023-07-28 RX ADMIN — LEVETIRACETAM 1000 MG: 500 TABLET, FILM COATED ORAL at 09:07

## 2023-07-28 RX ADMIN — MIRTAZAPINE 15 MG: 15 TABLET, FILM COATED ORAL at 08:07

## 2023-07-28 RX ADMIN — INSULIN ASPART 20 UNITS: 100 INJECTION, SOLUTION INTRAVENOUS; SUBCUTANEOUS at 08:07

## 2023-07-28 RX ADMIN — MILRINONE LACTATE 0.25 MCG/KG/MIN: 0.2 INJECTION, SOLUTION INTRAVENOUS at 07:07

## 2023-07-28 RX ADMIN — INSULIN DETEMIR 22 UNITS: 100 INJECTION, SOLUTION SUBCUTANEOUS at 08:07

## 2023-07-28 RX ADMIN — INSULIN ASPART 6 UNITS: 100 INJECTION, SOLUTION INTRAVENOUS; SUBCUTANEOUS at 12:07

## 2023-07-28 RX ADMIN — HEPARIN SODIUM 20 UNITS/KG/HR: 10000 INJECTION, SOLUTION INTRAVENOUS at 04:07

## 2023-07-28 RX ADMIN — FUROSEMIDE 80 MG: 80 TABLET ORAL at 09:07

## 2023-07-28 RX ADMIN — SENNOSIDES AND DOCUSATE SODIUM 2 TABLET: 50; 8.6 TABLET ORAL at 08:07

## 2023-07-28 RX ADMIN — BUSPIRONE HYDROCHLORIDE 10 MG: 10 TABLET ORAL at 08:07

## 2023-07-28 RX ADMIN — WARFARIN SODIUM 4.5 MG: 2.5 TABLET ORAL at 05:07

## 2023-07-28 RX ADMIN — MUPIROCIN: 20 OINTMENT TOPICAL at 08:07

## 2023-07-28 RX ADMIN — POTASSIUM CHLORIDE 40 MEQ: 1500 TABLET, EXTENDED RELEASE ORAL at 08:07

## 2023-07-28 RX ADMIN — CEFAZOLIN 8 G: 2 INJECTION, POWDER, FOR SOLUTION INTRAMUSCULAR; INTRAVENOUS at 09:07

## 2023-07-28 RX ADMIN — INSULIN ASPART 22 UNITS: 100 INJECTION, SOLUTION INTRAVENOUS; SUBCUTANEOUS at 05:07

## 2023-07-28 RX ADMIN — MILRINONE LACTATE 0.25 MCG/KG/MIN: 0.2 INJECTION, SOLUTION INTRAVENOUS at 04:07

## 2023-07-28 RX ADMIN — INSULIN DETEMIR 22 UNITS: 100 INJECTION, SOLUTION SUBCUTANEOUS at 09:07

## 2023-07-28 RX ADMIN — INSULIN ASPART 22 UNITS: 100 INJECTION, SOLUTION INTRAVENOUS; SUBCUTANEOUS at 12:07

## 2023-07-28 RX ADMIN — BUSPIRONE HYDROCHLORIDE 10 MG: 10 TABLET ORAL at 09:07

## 2023-07-28 RX ADMIN — ASPIRIN 81 MG: 81 TABLET, COATED ORAL at 09:07

## 2023-07-28 RX ADMIN — LEVETIRACETAM 1000 MG: 500 TABLET, FILM COATED ORAL at 08:07

## 2023-07-28 NOTE — PROCEDURES
Interrogation of Ventricular assist device was performed with physician analysis of device parameters and review of device function. I have personally reviewed the interrogation findings and agree with findings as stated.   Procedure: Device Interrogation Including analysis of device parameters  Current Settings: Ventricular Assist Device  Review of device function is stable  TXP LVAD INTERROGATIONS 7/28/2023 7/28/2023 7/27/2023 7/27/2023 7/27/2023 7/27/2023 7/27/2023   Type HeartMate3 HeartMate3 HeartMate3 HeartMate3 HeartMate3 HeartMate3 HeartMate3   Flow 4.2 4.1 4.0 4.2 4.1 4.4 3.9   Speed 5100 5100 5100 5100 5100 5100 5100   PI 6.0 6.0 5.8 4.4 6.3 4.4 6.1   Power (Serna) 3.8 3.7 3.8 3.7 3.8 3.9 3.8   LSL 4700 4700 4700 4700 4700 4700 4700   Low Flow Alarm - - - - - - -   High Power Alarm - - - - - - -   Pulsatility Pulse Pulse Pulse Pulse Pulse Pulse Pulse      Natalya Villatoro MD

## 2023-07-28 NOTE — ASSESSMENT & PLAN NOTE
Secondary to COVID-19 infection.      Plan    1. Remdesivir for 5 days.    2. Dexamethasone for 10 days.

## 2023-07-28 NOTE — PROGRESS NOTES
[facility] - Cardiology Stepdown  Heart Transplant  Progress Note    Patient Name: Kevan Queen  MRN: [id number]  Admission Date: 7/22/2023  Hospital Length of Stay: 6 days  Attending Physician: Natalya Villatoro MD  Primary Care Provider: ORALIA Cline  Principal Problem:Severe sepsis    Subjective:     **Interval History: Blood cultures through 7/25 +ve for MSSA, Ancef dosage was increased bu ID to 8 grams qd and blood cultures repeated today. WCC remains elevated at 42K, afebrile and on d 7/10 steroids.Got IVP Lasix overnight for CVP of 13. Euvolemic n exam this morning. INR is now therapeutic at 2.0 - Heparin bridge D/C'd. No further coughing up blood clots.    Continuous Infusions:   milrinone 20mg/100ml D5W (200mcg/ml) 0.25 mcg/kg/min (07/28/23 0430)     Scheduled Meds:   aspirin  81 mg Oral Daily    busPIRone  10 mg Oral BID    ceFAZolin(ANCEF) in D5W 500 mL CONTINUOUS INFUSION  8 g Intravenous Q24H    dexAMETHasone  6 mg Oral Daily    furosemide  80 mg Oral Daily    insulin aspart U-100  22 Units Subcutaneous TIDWM    insulin detemir U-100  22 Units Subcutaneous BID    levETIRAcetam  1,000 mg Oral BID    mirtazapine  15 mg Oral Nightly    mupirocin   Nasal BID    polyethylene glycol  17 g Oral Daily    potassium chloride  40 mEq Oral BID    senna-docusate 8.6-50 mg  2 tablet Oral BID    warfarin  4.5 mg Oral Daily     PRN Meds:acetaminophen, dextrose 10%, dextrose 10%, glucagon (human recombinant), glucose, glucose, HYDROcodone-acetaminophen, insulin aspart U-100    Review of patient's allergies indicates:   Allergen Reactions    Bumex [bumetanide] Hives    Torsemide Hives     Objective:     Vital Signs (Most Recent):  Temp: 98.2 °F (36.8 °C) (07/28/23 0808)  Pulse: (!) 115 (07/27/23 2300)  Resp: 20 (07/28/23 0808)  BP: 97/71 (07/28/23 0808)  SpO2: 96 % (07/28/23 0808) Vital Signs (24h Range):  Temp:  [97.8 °F (36.6 °C)-98.3 °F (36.8 °C)] 98.2 °F (36.8 °C)  Pulse:  [108-115] 115  Resp:   [18-20] 20  SpO2:  [96 %-99 %] 96 %  BP: ()/(0-71) 97/71     Patient Vitals for the past 72 hrs (Last 3 readings):   Weight   07/27/23 0745 92.9 kg (204 lb 12.9 oz)   07/26/23 0750 91.9 kg (202 lb 9.6 oz)     Body mass index is 25.6 kg/m².      Intake/Output Summary (Last 24 hours) at 7/28/2023 0958  Last data filed at 7/28/2023 0453  Gross per 24 hour   Intake 942 ml   Output 3250 ml   Net -2308 ml       Hemodynamic Parameters:       Telemetry: ST with run run SVT       Physical Exam  Constitutional:       Appearance: Normal appearance.   HENT:      Head: Normocephalic and atraumatic.   Eyes:      Conjunctiva/sclera: Conjunctivae normal.      Pupils: Pupils are equal, round, and reactive to light.   Neck:      Comments: Do not appreciate elevated neck veins  Cardiovascular:      Rate and Rhythm: Regular rhythm. Tachycardia present.      Comments: Smooth VAD hum  Pulmonary:      Effort: Pulmonary effort is normal.      Breath sounds: Normal breath sounds.   Abdominal:      General: Bowel sounds are normal.      Palpations: Abdomen is soft.   Musculoskeletal:         General: No swelling. Normal range of motion.      Cervical back: Normal range of motion and neck supple.   Skin:     General: Skin is warm and dry.      Capillary Refill: Capillary refill takes 2 to 3 seconds.   Neurological:      General: No focal deficit present.      Mental Status: He is alert and oriented to person, place, and time.   Psychiatric:         Mood and Affect: Mood normal.         Behavior: Behavior normal.         Thought Content: Thought content normal.         Judgment: Judgment normal.          Significant Labs:  CBC:  Recent Labs   Lab 07/26/23  0422 07/27/23  0401 07/28/23  0431   WBC 31.42* 41.97* 42.35*   RBC 3.82* 3.57* 3.38*   HGB 10.3* 9.7* 9.2*   HCT 30.6* 28.3* 27.3*   * 558* 549*   MCV 80* 79* 81*   MCH 27.0 27.2 27.2   MCHC 33.7 34.3 33.7     BNP:  Recent Labs   Lab 07/24/23  0322 07/26/23  0422 07/28/23  0431    * 273* 337*     CMP:  Recent Labs   Lab 07/24/23  0322 07/25/23  0428 07/26/23  0422 07/27/23  0401 07/27/23  1307 07/28/23  0431   *   < > 369* 302* 248* 248*   CALCIUM 9.4   < > 9.0 8.9 9.5 9.4   ALBUMIN 2.1*  --  2.1*  --   --  2.2*   PROT 7.4  --  7.2  --   --  6.7   *   < > 132* 135* 137 135*   K 3.8   < > 4.1 4.0 3.7 3.9   CO2 30*   < > 26 28 29 28   CL 90*   < > 93* 95 95 97   BUN 36*   < > 31* 29* 26* 29*   CREATININE 1.5*   < > 1.3 1.2 1.2 1.2   ALKPHOS 158*  --  143*  --   --  121   ALT 32  --  58*  --   --  30   AST 47*  --  79*  --   --  32   BILITOT 1.6*  --  1.0  --   --  0.7    < > = values in this interval not displayed.      Coagulation:   Recent Labs   Lab 07/26/23  0422 07/27/23  0401 07/27/23  1307 07/27/23  1910 07/28/23  0431   INR 1.5* 1.8*  --   --  2.0*   APTT 53.6* 71.5* 50.5* 55.5* 77.1*     LDH:  Recent Labs   Lab 07/26/23 0422 07/27/23  0401 07/28/23  0431   * 437* 418*     Microbiology:  Microbiology Results (last 7 days)       Procedure Component Value Units Date/Time    Blood culture [[id number]] Collected: 07/28/23 0940    Order Status: Sent Specimen: Blood from Peripheral, Left Hand Updated: 07/28/23 0940    Blood culture [[id number]] Collected: 07/28/23 0939    Order Status: Sent Specimen: Blood from Antecubital, Left Arm Updated: 07/28/23 0939    Blood culture [[id number]]  (Abnormal) Collected: 07/25/23 0451    Order Status: Completed Specimen: Blood Updated: 07/28/23 0906     Blood Culture, Routine Gram stain aer bottle: Gram positive cocci in clusters resembling Staph      Results called to and read back by:Laura Kilgore RN 07/27/2023  13:01      STAPHYLOCOCCUS AUREUS  Susceptibility pending  [place] consult [place] [facility].      Blood culture [[id number]] Collected: 07/25/23 0450    Order Status: Completed Specimen: Blood Updated: 07/28/23 0612     Blood Culture, Routine No Growth to date      No Growth to date      No  Growth to date      No Growth to date    MRSA/SA Rapid ID by PCR from Blood culture [[id number]]  (Abnormal) Collected: 07/25/23 0451    Order Status: Completed Updated: 07/27/23 1438     Staph aureus ID by PCR Positive     Methicillin Resistant ID by PCR Negative    Blood culture [[id number]]  (Abnormal)  (Susceptibility) Collected: 07/23/23 1305    Order Status: Completed Specimen: Blood from Line, Jugular, Internal Right Updated: 07/26/23 1120     Blood Culture, Routine Gram stain aer bottle: Gram positive cocci in clusters resembling Staph      Results called to and read back by:Cong Urena RN 07/24/2023  16:05      STAPHYLOCOCCUS AUREUS  ID consult required at [facility].      Culture, Anaerobe [[id number]] Collected: 07/22/23 0159    Order Status: Completed Specimen: Abscess from Abdomen Updated: 07/25/23 1134     Anaerobic Culture No anaerobes isolated    Blood culture [[id number]]  (Abnormal)  (Susceptibility) Collected: 07/22/23 1058    Order Status: Completed Specimen: Blood from Peripheral, Hand, Right Updated: 07/25/23 1014     Blood Culture, Routine Gram stain aer bottle: Gram positive cocci in clusters resembling Staph      Results called to and read back by:Zaheer Rae RN 07/23/2023  06:41      STAPHYLOCOCCUS AUREUS  ID consult required at [facility].      Aerobic culture [[id number]]  (Abnormal)  (Susceptibility) Collected: 07/22/23 0159    Order Status: Completed Specimen: Abscess from Abdomen Updated: 07/24/23 1113     Aerobic Bacterial Culture STAPHYLOCOCCUS AUREUS  Few      MRSA/SA Rapid ID by PCR from Blood culture [[id number]]  (Abnormal) Collected: 07/22/23 1058    Order Status: Completed Updated: 07/23/23 0557     Staph aureus ID by PCR Positive     Methicillin Resistant ID by PCR Negative    Blood culture [[id number]]     Order Status: Canceled Specimen: Blood             I have reviewed all pertinent labs within the past 24  hours.    Estimated Creatinine Clearance: 99.8 mL/min (based on SCr of 1.2 mg/dL).    Diagnostic Results:  I have reviewed and interpreted all pertinent imaging results/findings within the past 24 hours.    Assessment and Plan:     Patient is a 38 yo black male with stage d HFrEF, NICM, ? 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 He underwent RVAD removal and chest closure 6/30/2022.  He was weaned off  but he had to restarted due to RVF. He was eventually transitioned to milrinone (secondary to  shortage) now on 0.25 mcg/kg/min.  Patient also has history of driveline infection.  Patient presented to the emergency room today because of pleuritic chest pain that has been going on for the last 3 days however it was more intense today.  Also complains of having shortness of breath and fevers associated with it.  He was spiking fever of 102 in the emergency room with elevated white blood cell count up to 17,000. He was treated with Zosyn.  He was sent here for admission.  Patient was admitted a month back for COVID infection for which he received remdesivir for 3 days.  He is on doxycycline for chronic driveline infection.  He is on Milrinone for RV failure.  Patient denies having any low flow alarms on the pump.  Also denies having any lower extremity edema, increased discharge from his driveline site.  Patient has ground-glass opacities on imaging from outside hospital      * Severe sepsis  Patient presents with elevated fevers, white blood cell count, elevated lactic acid.    ID following. Possible Secondary to COVID-19 infection.    Continue:    1. Remdesivir for 5 days(day5).   2. Dexamethasone for 10 days(Day6).  History of MSSA infection of drive line.  Blood Cultures obtained on this admission are positive for MSSA as well.    Continue  Cefazolin 6 grams IV q 24 hours by continuous infusion.  Will need PICC if he requires home IV Abx.     Seizure-like activity  On on Keppra    LVAD (left ventricular assist device) present  -HeartMate 3 Implanted on 6/29/2022 as DT with RV failure on milrinone at 0.25 mcg/kg/min.  -Continue Coumadin, Goal INR 2.0-3.0. Sub Therapeutic today. INR 1.8. Cont Heparin.   -LDH is stable.  Will continue to monitor daily.  -Speed set at 5100, LSL 4700 rpm.    Procedure: Device Interrogation Including analysis of device parameters  Current Settings: Ventricular Assist Device  Review of device function is stable/unstable stable    TXP LVAD INTERROGATIONS 7/27/2023 7/27/2023 7/26/2023 7/26/2023 7/26/2023 7/26/2023 7/26/2023   Type HeartMate3 HeartMate3 HeartMate3 HeartMate3 HeartMate3 HeartMate3 HeartMate3   Flow 4.2 4.1 4.2 4.3 4.3 4.0 4.1   Speed 5100 5150 5100 5100 5100 5100 5100   PI 4.4 4.3 5.2 3.5 4.8 5.8 5.2   Power (Serna) 3.7 3.8 3.9 3.6 3.8 3.6 3.8   LSL 4700 4700 4700 4700 4700 4700 4700   Low Flow Alarm - - - - - - -   High Power Alarm - - - - - - -   Pulsatility No Pulse Pulse - Pulse Pulse Pulse -       Type 2 diabetes mellitus with hyperglycemia  Will keep him on sliding scale and a.c. HS    Chronic combined systolic and diastolic heart failure  Status post LVAD.  On GDMT with valsartan [place] [id number]  Heart Transplant  [facility] - [facility]iology Stepdown

## 2023-07-28 NOTE — PLAN OF CARE
Problem: Adult Inpatient Plan of Care  Goal: Plan of Care Review  Outcome: Ongoing, Progressing  Goal: Patient-Specific Goal (Individualized)  Outcome: Ongoing, Progressing  Goal: Absence of Hospital-Acquired Illness or Injury  Outcome: Ongoing, Progressing  Goal: Optimal Comfort and Wellbeing  Outcome: Ongoing, Progressing  Goal: Readiness for Transition of Care  Outcome: Ongoing, Progressing     Problem: Diabetes Comorbidity  Goal: Blood Glucose Level Within Targeted Range  Outcome: Ongoing, Progressing     Problem: Infection  Goal: Absence of Infection Signs and Symptoms  Outcome: Ongoing, Progressing     Problem: Adjustment to Illness (Sepsis/Septic Shock)  Goal: Optimal Coping  Outcome: Ongoing, Progressing     Problem: Bleeding (Sepsis/Septic Shock)  Goal: Absence of Bleeding  Outcome: Ongoing, Progressing     Problem: Glycemic Control Impaired (Sepsis/Septic Shock)  Goal: Blood Glucose Level Within Desired Range  Outcome: Ongoing, Progressing     Problem: Infection Progression (Sepsis/Septic Shock)  Goal: Absence of Infection Signs and Symptoms  Outcome: Ongoing, Progressing     Problem: Nutrition Impaired (Sepsis/Septic Shock)  Goal: Optimal Nutrition Intake  Outcome: Ongoing, Progressing     Problem: Fluid Imbalance (Pneumonia)  Goal: Fluid Balance  Outcome: Ongoing, Progressing     Problem: Infection (Pneumonia)  Goal: Resolution of Infection Signs and Symptoms  Outcome: Ongoing, Progressing     Problem: Respiratory Compromise (Pneumonia)  Goal: Effective Oxygenation and Ventilation  Outcome: Ongoing, Progressing     Problem: Fall Injury Risk  Goal: Absence of Fall and Fall-Related Injury  Outcome: Ongoing, Progressing     Problem: Hemodynamic Instability (Ventricular Assist Device)  Goal: Optimal Blood Flow  Outcome: Ongoing, Progressing     Problem: Infection (Ventricular Assist Device)  Goal: Absence of Infection Signs and Symptoms  Outcome: Ongoing, Progressing     Problem: Right-Sided Heart  Compromise (Ventricular Assist Device)  Goal: Effective Right-Sided Heart Function  Outcome: Ongoing, Progressing

## 2023-07-28 NOTE — PROGRESS NOTES
Attempted to reach pt by phone for update due to COVID-19 isolation restrictions. Per admit note both of pt's cell phones are off. Pt does not answer room phone. SW will make further attempts next week. Providing ongoing psychosocial and counseling support, education, resources, assistance and discharge planning as indicated. Following and available.

## 2023-07-28 NOTE — NURSING
Patient departed room to retrieve ordered food downstairs without notifying RN first. Patient previously informed due to active covid infection, not to leave room. Patient will be re-educated on infection control upon return to room.

## 2023-07-28 NOTE — PROGRESS NOTES
[facility]  Infectious Disease  Progress Note    Patient Name: Kevan Queen  MRN: [id number]  Admission Date: 7/22/2023  Length of Stay: 5 days  Attending Physician: Natalya Villatoro MD  Primary Care Provider: ORALIA Cline    Isolation Status: Airborne and Contact and Droplet  Assessment/Plan:      Pulmonary  Acute respiratory failure with hypoxia  Secondary to COVID-19 infection.      Plan    1. Remdesivir for 5 days.    2. Dexamethasone for 10 days.    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  Cultures obtained on this admission are positive for MSSA as well.      Plan      1. IV cefazolin.    2. Anticipate 6 weeks of therapy.    Staphylococcus aureus bacteremia  38 year old male with a history of MSSA infection of his drive line exit site.  He is admitted with sepsis.  Blood cultures are positive for MSSA.  Will increase cefazolin dose from 6 grams q 24 to 8 grams q 24.  Continue to monitor     Plan    1. Continue IV cefazolin.    2. Monitor repeat blood cultures.    3. Anticipate 6 weeks of antibiotics.        Anticipated Disposition: TBD    Thank you for your consult. I will follow-up with patient. Please contact us if you have any additional questions.    Benny Paul MD  Infectious Disease  [facility] Stepdown    Subjective:     Principal Problem:Severe sepsis    HPI: 38 year old male with a history of CHF s/p LVAD placement in June of 2022.  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  He was apparently on room air for most of that hospital stay.  He received 3 days of remdesivir.  He was discharged on oral doxycycline to complete a 14 day course for the MSSA drive line infection.  He had tested negative for COVID-19 following his treatment.  He is re-admitted with complaints of shortness of breath,  chest pains and generalized ill feeling. COVID-19 testing is positive again.  Chest x-ray shows diffuse infiltrates bilaterally.  ID is consulted to assist with his management.      Interval History: Feels better.    Review of Systems   All other systems reviewed and are negative.  Objective:     Vital Signs (Most Recent):  Temp: 98.1 °F (36.7 °C) (07/27/23 1931)  Pulse: 108 (07/27/23 1931)  Resp: 19 (07/27/23 2118)  BP: (!) 82/0 (07/27/23 1931)  SpO2: 98 % (07/27/23 1931) Vital Signs (24h Range):  Temp:  [97 °F (36.1 °C)-98.2 °F (36.8 °C)] 98.1 °F (36.7 °C)  Pulse:  [] 108  Resp:  [17-20] 19  SpO2:  [92 %-99 %] 98 %  BP: ()/(0-69) 82/0     Weight: 92.9 kg (204 lb 12.9 oz)  Body mass index is 25.6 kg/m².    Estimated Creatinine Clearance: 99.8 mL/min (based on SCr of 1.2 mg/dL).     Physical Exam  Vitals and nursing note reviewed.   Constitutional:       General: He is not in acute distress.     Appearance: He is well-developed. He is not diaphoretic.   HENT:      Head: Normocephalic and atraumatic.      Right Ear: External ear normal.      Left Ear: External ear normal.      Nose: Nose normal.      Mouth/Throat:      Pharynx: No oropharyngeal exudate.   Eyes:      General: No scleral icterus.        Right eye: No discharge.         Left eye: No discharge.      Conjunctiva/sclera: Conjunctivae normal.      Pupils: Pupils are equal, round, and reactive to light.   Neck:      Thyroid: No thyromegaly.      Vascular: No JVD.      Trachea: No tracheal deviation.   Cardiovascular:      Comments: hum  Pulmonary:      Effort: Pulmonary effort is normal. No respiratory distress.      Breath sounds: Normal breath sounds. No stridor. No wheezing or rales.   Chest:      Chest wall: No tenderness.   Abdominal:      General: Bowel sounds are normal. There is no distension.      Palpations: Abdomen is soft. There is no mass.      Tenderness: There is no abdominal tenderness. There is no guarding or rebound.       Comments: Drive line in place.   Musculoskeletal:         General: No tenderness. Normal range of motion.      Cervical back: Normal range of motion and neck supple.   Lymphadenopathy:      Cervical: No cervical adenopathy.   Skin:     General: Skin is warm.      Coloration: Skin is not pale.      Findings: No erythema or rash.   Neurological:      Mental Status: He is alert and oriented to person, place, and time.      Cranial Nerves: No cranial nerve deficit.      Motor: No abnormal muscle tone.      Coordination: Coordination normal.      Deep Tendon Reflexes: Reflexes are normal and symmetric. Reflexes normal.   Psychiatric:         Behavior: Behavior normal.         Thought Content: Thought content normal.         Judgment: Judgment normal.        Significant Labs:   Microbiology Results (last 7 days)       Procedure Component Value Units Date/Time    MRSA/SA Rapid ID by PCR from Blood culture [[id number]]  (Abnormal) Collected: 07/25/23 0451    Order Status: Completed Updated: 07/27/23 1438     Staph aureus ID by PCR Positive     Methicillin Resistant ID by PCR Negative    Blood culture [[id number]] Collected: 07/25/23 0451    Order Status: Completed Specimen: Blood Updated: 07/27/23 1302     Blood Culture, Routine Gram stain aer bottle: Gram positive cocci in clusters resembling Staph      Results called to and read back by:Laura Kilgore RN 07/27/2023  13:01    Blood culture [[id number]] Collected: 07/25/23 0450    Order Status: Completed Specimen: Blood Updated: 07/27/23 0612     Blood Culture, Routine No Growth to date      No Growth to date      No Growth to date    Blood culture [[id number]]  (Abnormal)  (Susceptibility) Collected: 07/23/23 1305    Order Status: Completed Specimen: Blood from Line, Jugular, Internal Right Updated: 07/26/23 1120     Blood Culture, Routine Gram stain aer bottle: Gram positive cocci in clusters resembling Staph      Results called to and read back by:Cong Urena RN 07/24/2023   16:05      STAPHYLOCOCCUS AUREUS  ID consult required at [facility].      Culture, Anaerobe [[id number]] Collected: 07/22/23 0159    Order Status: Completed Specimen: Abscess from Abdomen Updated: 07/25/23 1134     Anaerobic Culture No anaerobes isolated    Blood culture [[id number]]  (Abnormal)  (Susceptibility) Collected: 07/22/23 1058    Order Status: Completed Specimen: Blood from Peripheral, Hand, Right Updated: 07/25/23 1014     Blood Culture, Routine Gram stain aer bottle: Gram positive cocci in clusters resembling Staph      Results called to and read back by:Zaheer Rae RN 07/23/2023  06:41      STAPHYLOCOCCUS AUREUS  ID consult required at [facility].      Aerobic culture [[id number]]  (Abnormal)  (Susceptibility) Collected: 07/22/23 0159    Order Status: Completed Specimen: Abscess from Abdomen Updated: 07/24/23 1113     Aerobic Bacterial Culture STAPHYLOCOCCUS AUREUS  Few      MRSA/SA Rapid ID by PCR from Blood culture [[id number]]  (Abnormal) Collected: 07/22/23 1058    Order Status: Completed Updated: 07/23/23 0557     Staph aureus ID by PCR Positive     Methicillin Resistant ID by PCR Negative    Blood culture [[id number]]     Order Status: Canceled Specimen: Blood             Significant Imaging: I have reviewed all pertinent imaging results/findings within the past 24 hours.

## 2023-07-28 NOTE — NURSING
Patient returned to room @ 2110. RN immediately assessed patient.  O2 sat 100 with patient on 4LNC as soon as patient arrived. Patient reports semi SOB, but no other symptoms. RN educated patient again on infection control.

## 2023-07-28 NOTE — CARE UPDATE
Care Update:     No acute events overnight. Patient on the CSU in room 314/314 A. Blood glucose stable. BG above goal on current insulin regimen (SSI, prandial, and basal insulin ). Steroid use- Dexamethasone  6 mg .    Renal function- Normal   Vasopressors-  None       Diet Adult Regular (IDDSI Level 7) [facility]; Double Portions; Fluid - 1500mL     POCT Glucose   Date Value Ref Range Status   07/28/2023 219 (H) 70 - 110 mg/dL Final   07/27/2023 276 (H) 70 - 110 mg/dL Final   07/27/2023 246 (H) 70 - 110 mg/dL Final   07/27/2023 268 (H) 70 - 110 mg/dL Final   07/27/2023 214 (H) 70 - 110 mg/dL Final   07/27/2023 281 (H) 70 - 110 mg/dL Final   07/27/2023 257 (H) 70 - 110 mg/dL Final   07/26/2023 269 (H) 70 - 110 mg/dL Final   07/26/2023 176 (H) 70 - 110 mg/dL Final   07/26/2023 259 (H) 70 - 110 mg/dL Final   07/26/2023 273 (H) 70 - 110 mg/dL Final   07/25/2023 297 (H) 70 - 110 mg/dL Final   07/25/2023 311 (H) 70 - 110 mg/dL Final   07/25/2023 237 (H) 70 - 110 mg/dL Final     Lab Results   Component Value Date    HGBA1C 6.7 (H) 07/22/2023       Diabetes Medications               insulin aspart U-100 (NOVOLOG) 100 unit/mL (3 mL) InPn pen Inject 16 Units into the skin 3 (three) times daily with meals. Plus Sliding Scale: 151-200: +1, 201-250: +2, 251-300: +3, 301-350: +4 and call MD; Max Daily Dose: 60 units    insulin detemir U-100, Levemir, 100 unit/mL (3 mL) SubQ InPn pen Inject 22 Units into the skin 2 (two) times daily.        Endocrine  Type 2 diabetes mellitus with hyperglycemia  BG goal 140-180     - Levemir to 22units BID  - Novolog to 22 units TID with meals (20% increase due to prandial blood glucose  above goal)   - Continue Moderate Dose Correction Scale      - BG monitoring ac/hs       ** Please notify Endocrine for any change and/or advance in diet**  ** Please call Endocrine for any BG related issues **    Discharge Planning:   TBD. Please notify endocrinology prior to discharge.      Michael KATZ  Garo HOLT, FNP-C  Department of Endocrinology  Inpatient Glycemic Management

## 2023-07-28 NOTE — ASSESSMENT & PLAN NOTE
38 year old male with a history of MSSA infection of his drive line exit site.  He is admitted with sepsis.  Blood cultures are positive for MSSA.  Will increase cefazolin dose from 6 grams q 24 to 8 grams q 24.  Continue to monitor     Plan    1. Continue IV cefazolin.    2. Monitor repeat blood cultures.    3. Anticipate 6 weeks of antibiotics.

## 2023-07-28 NOTE — SUBJECTIVE & OBJECTIVE
Interval History: Feels better.    Review of Systems   All other systems reviewed and are negative.  Objective:     Vital Signs (Most Recent):  Temp: 98.1 °F (36.7 °C) (07/27/23 1931)  Pulse: 108 (07/27/23 1931)  Resp: 19 (07/27/23 2118)  BP: (!) 82/0 (07/27/23 1931)  SpO2: 98 % (07/27/23 1931) Vital Signs (24h Range):  Temp:  [97 °F (36.1 °C)-98.2 °F (36.8 °C)] 98.1 °F (36.7 °C)  Pulse:  [] 108  Resp:  [17-20] 19  SpO2:  [92 %-99 %] 98 %  BP: ()/(0-69) 82/0     Weight: 92.9 kg (204 lb 12.9 oz)  Body mass index is 25.6 kg/m².    Estimated Creatinine Clearance: 99.8 mL/min (based on SCr of 1.2 mg/dL).     Physical Exam  Vitals and nursing note reviewed.   Constitutional:       General: He is not in acute distress.     Appearance: He is well-developed. He is not diaphoretic.   HENT:      Head: Normocephalic and atraumatic.      Right Ear: External ear normal.      Left Ear: External ear normal.      Nose: Nose normal.      Mouth/Throat:      Pharynx: No oropharyngeal exudate.   Eyes:      General: No scleral icterus.        Right eye: No discharge.         Left eye: No discharge.      Conjunctiva/sclera: Conjunctivae normal.      Pupils: Pupils are equal, round, and reactive to light.   Neck:      Thyroid: No thyromegaly.      Vascular: No JVD.      Trachea: No tracheal deviation.   Cardiovascular:      Comments: hum  Pulmonary:      Effort: Pulmonary effort is normal. No respiratory distress.      Breath sounds: Normal breath sounds. No stridor. No wheezing or rales.   Chest:      Chest wall: No tenderness.   Abdominal:      General: Bowel sounds are normal. There is no distension.      Palpations: Abdomen is soft. There is no mass.      Tenderness: There is no abdominal tenderness. There is no guarding or rebound.      Comments: Drive line in place.   Musculoskeletal:         General: No tenderness. Normal range of motion.      Cervical back: Normal range of motion and neck supple.   Lymphadenopathy:       Cervical: No cervical adenopathy.   Skin:     General: Skin is warm.      Coloration: Skin is not pale.      Findings: No erythema or rash.   Neurological:      Mental Status: He is alert and oriented to person, place, and time.      Cranial Nerves: No cranial nerve deficit.      Motor: No abnormal muscle tone.      Coordination: Coordination normal.      Deep Tendon Reflexes: Reflexes are normal and symmetric. Reflexes normal.   Psychiatric:         Behavior: Behavior normal.         Thought Content: Thought content normal.         Judgment: Judgment normal.        Significant Labs:   Microbiology Results (last 7 days)       Procedure Component Value Units Date/Time    MRSA/SA Rapid ID by PCR from Blood culture [[id number]]  (Abnormal) Collected: 07/25/23 0451    Order Status: Completed Updated: 07/27/23 1438     Staph aureus ID by PCR Positive     Methicillin Resistant ID by PCR Negative    Blood culture [[id number]] Collected: 07/25/23 0451    Order Status: Completed Specimen: Blood Updated: 07/27/23 1302     Blood Culture, Routine Gram stain aer bottle: Gram positive cocci in clusters resembling Staph      Results called to and read back by:Laura Kilgore RN 07/27/2023  13:01    Blood culture [[id number]] Collected: 07/25/23 0450    Order Status: Completed Specimen: Blood Updated: 07/27/23 0612     Blood Culture, Routine No Growth to date      No Growth to date      No Growth to date    Blood culture [[id number]]  (Abnormal)  (Susceptibility) Collected: 07/23/23 1305    Order Status: Completed Specimen: Blood from Line, Jugular, Internal Right Updated: 07/26/23 1120     Blood Culture, Routine Gram stain aer bottle: Gram positive cocci in clusters resembling Staph      Results called to and read back by:Cong Urena RN 07/24/2023  16:05      STAPHYLOCOCCUS AUREUS  ID consult required at [facility], [facility].      Culture, Anaerobe [[id number]] Collected: 07/22/23 0159    Order Status: Completed  Specimen: Abscess from Abdomen Updated: 07/25/23 1134     Anaerobic Culture No anaerobes isolated    Blood culture [[id number]]  (Abnormal)  (Susceptibility) Collected: 07/22/23 1058    Order Status: Completed Specimen: Blood from Peripheral, Hand, Right Updated: 07/25/23 1014     Blood Culture, Routine Gram stain aer bottle: Gram positive cocci in clusters resembling Staph      Results called to and read back by:Zaheer Rae RN 07/23/2023  06:41      STAPHYLOCOCCUS AUREUS  ID consult required at [facility] and [facility].      Aerobic culture [[id number]]  (Abnormal)  (Susceptibility) Collected: 07/22/23 0159    Order Status: Completed Specimen: Abscess from Abdomen Updated: 07/24/23 1113     Aerobic Bacterial Culture STAPHYLOCOCCUS AUREUS  Few      MRSA/SA Rapid ID by PCR from Blood culture [[id number]]  (Abnormal) Collected: 07/22/23 1058    Order Status: Completed Updated: 07/23/23 0557     Staph aureus ID by PCR Positive     Methicillin Resistant ID by PCR Negative    Blood culture [[id number]]     Order Status: Canceled Specimen: Blood             Significant Imaging: I have reviewed all pertinent imaging results/findings within the past 24 hours.

## 2023-07-28 NOTE — PROGRESS NOTES
07/28/2023  Mirela Chris    Current provider:  Natalya Villatoro MD    Device interrogation:  TXP LVAD INTERROGATIONS 7/28/2023 7/28/2023 7/27/2023 7/27/2023 7/27/2023 7/27/2023 7/27/2023   Type HeartMate3 HeartMate3 HeartMate3 HeartMate3 HeartMate3 HeartMate3 HeartMate3   Flow 4.2 4.1 4.0 4.2 4.1 4.4 3.9   Speed 5100 5100 5100 5100 5100 5100 5100   PI 6.0 6.0 5.8 4.4 6.3 4.4 6.1   Power (Serna) 3.8 3.7 3.8 3.7 3.8 3.9 3.8   LSL 4700 4700 4700 4700 4700 4700 4700   Low Flow Alarm - - - - - - -   High Power Alarm - - - - - - -   Pulsatility Pulse Pulse Pulse Pulse Pulse Pulse Pulse          Rounded on Kevan Queen to ensure all mechanical assist device settings (IABP or VAD) were appropriate and all parameters were within limits.  I was able to ensure all back up equipment was present, the staff had no issues, and the Perfusion Department daily rounding was complete.      For implantable VADs: Interrogation of Ventricular assist device was performed with analysis of device parameters and review of device function. I have personally reviewed the interrogation findings and agree with findings as stated.     In emergency, the nursing units have been notified to contact the perfusion department either by:  Calling [id number] from 630am to 4pm Mon thru Fri, utilizing the On-Call Finder functionality of Epic and searching for Perfusion, or by contacting the hospital  from 4pm to 630am and on weekends and asking to speak with the perfusionist on call.    10:54 AM

## 2023-07-28 NOTE — SUBJECTIVE & OBJECTIVE
**Interval History: Blood cultures through 7/25 +ve for MSSA, Ancef dosage was increased bu ID to 8 grams qd and blood cultures repeated today. WCC remains elevated at 42K, afebrile and on d 7/10 steroids.Got IVP Lasix overnight for CVP of 13. Euvolemic n exam this morning. INR is now therapeutic at 2.0 - Heparin bridge D/C'd. No further coughing up blood clots.    Continuous Infusions:   milrinone 20mg/100ml D5W (200mcg/ml) 0.25 mcg/kg/min (07/28/23 0430)     Scheduled Meds:   aspirin  81 mg Oral Daily    busPIRone  10 mg Oral BID    ceFAZolin(ANCEF) in D5W 500 mL CONTINUOUS INFUSION  8 g Intravenous Q24H    dexAMETHasone  6 mg Oral Daily    furosemide  80 mg Oral Daily    insulin aspart U-100  22 Units Subcutaneous TIDWM    insulin detemir U-100  22 Units Subcutaneous BID    levETIRAcetam  1,000 mg Oral BID    mirtazapine  15 mg Oral Nightly    mupirocin   Nasal BID    polyethylene glycol  17 g Oral Daily    potassium chloride  40 mEq Oral BID    senna-docusate 8.6-50 mg  2 tablet Oral BID    warfarin  4.5 mg Oral Daily     PRN Meds:acetaminophen, dextrose 10%, dextrose 10%, glucagon (human recombinant), glucose, glucose, HYDROcodone-acetaminophen, insulin aspart U-100    Review of patient's allergies indicates:   Allergen Reactions    Bumex [bumetanide] Hives    Torsemide Hives     Objective:     Vital Signs (Most Recent):  Temp: 98.2 °F (36.8 °C) (07/28/23 0808)  Pulse: (!) 115 (07/27/23 2300)  Resp: 20 (07/28/23 0808)  BP: 97/71 (07/28/23 0808)  SpO2: 96 % (07/28/23 0808) Vital Signs (24h Range):  Temp:  [97.8 °F (36.6 °C)-98.3 °F (36.8 °C)] 98.2 °F (36.8 °C)  Pulse:  [108-115] 115  Resp:  [18-20] 20  SpO2:  [96 %-99 %] 96 %  BP: ()/(0-71) 97/71     Patient Vitals for the past 72 hrs (Last 3 readings):   Weight   07/27/23 0745 92.9 kg (204 lb 12.9 oz)   07/26/23 0750 91.9 kg (202 lb 9.6 oz)     Body mass index is 25.6 kg/m².      Intake/Output Summary (Last 24 hours) at 7/28/2023 0987  Last data filed at  7/28/2023 0453  Gross per 24 hour   Intake 942 ml   Output 3250 ml   Net -2308 ml       Hemodynamic Parameters:       Telemetry: ST with run run SVT       Physical Exam  Constitutional:       Appearance: Normal appearance.   HENT:      Head: Normocephalic and atraumatic.   Eyes:      Conjunctiva/sclera: Conjunctivae normal.      Pupils: Pupils are equal, round, and reactive to light.   Neck:      Comments: Do not appreciate elevated neck veins  Cardiovascular:      Rate and Rhythm: Regular rhythm. Tachycardia present.      Comments: Smooth VAD hum  Pulmonary:      Effort: Pulmonary effort is normal.      Breath sounds: Normal breath sounds.   Abdominal:      General: Bowel sounds are normal.      Palpations: Abdomen is soft.   Musculoskeletal:         General: No swelling. Normal range of motion.      Cervical back: Normal range of motion and neck supple.   Skin:     General: Skin is warm and dry.      Capillary Refill: Capillary refill takes 2 to 3 seconds.   Neurological:      General: No focal deficit present.      Mental Status: He is alert and oriented to person, place, and time.   Psychiatric:         Mood and Affect: Mood normal.         Behavior: Behavior normal.         Thought Content: Thought content normal.         Judgment: Judgment normal.          Significant Labs:  CBC:  Recent Labs   Lab 07/26/23 0422 07/27/23  0401 07/28/23  0431   WBC 31.42* 41.97* 42.35*   RBC 3.82* 3.57* 3.38*   HGB 10.3* 9.7* 9.2*   HCT 30.6* 28.3* 27.3*   * 558* 549*   MCV 80* 79* 81*   MCH 27.0 27.2 27.2   MCHC 33.7 34.3 33.7     BNP:  Recent Labs   Lab 07/24/23 0322 07/26/23  0422 07/28/23  0431   * 273* 337*     CMP:  Recent Labs   Lab 07/24/23  0322 07/25/23  0428 07/26/23  0422 07/27/23  0401 07/27/23  1307 07/28/23  0431   *   < > 369* 302* 248* 248*   CALCIUM 9.4   < > 9.0 8.9 9.5 9.4   ALBUMIN 2.1*  --  2.1*  --   --  2.2*   PROT 7.4  --  7.2  --   --  6.7   *   < > 132* 135* 137 135*   K  3.8   < > 4.1 4.0 3.7 3.9   CO2 30*   < > 26 28 29 28   CL 90*   < > 93* 95 95 97   BUN 36*   < > 31* 29* 26* 29*   CREATININE 1.5*   < > 1.3 1.2 1.2 1.2   ALKPHOS 158*  --  143*  --   --  121   ALT 32  --  58*  --   --  30   AST 47*  --  79*  --   --  32   BILITOT 1.6*  --  1.0  --   --  0.7    < > = values in this interval not displayed.      Coagulation:   Recent Labs   Lab 07/26/23  0422 07/27/23  0401 07/27/23  1307 07/27/23  1910 07/28/23  0431   INR 1.5* 1.8*  --   --  2.0*   APTT 53.6* 71.5* 50.5* 55.5* 77.1*     LDH:  Recent Labs   Lab 07/26/23 0422 07/27/23  0401 07/28/23  0431   * 437* 418*     Microbiology:  Microbiology Results (last 7 days)       Procedure Component Value Units Date/Time    Blood culture [[id number]] Collected: 07/28/23 0940    Order Status: Sent Specimen: Blood from Peripheral, Left Hand Updated: 07/28/23 0940    Blood culture [[id number]] Collected: 07/28/23 0939    Order Status: Sent Specimen: Blood from Antecubital, Left Arm Updated: 07/28/23 0939    Blood culture [[id number]]  (Abnormal) Collected: 07/25/23 0451    Order Status: Completed Specimen: Blood Updated: 07/28/23 0906     Blood Culture, Routine Gram stain aer bottle: Gram positive cocci in clusters resembling Staph      Results called to and read back by:Laura Kilgore RN 07/27/2023  13:01      STAPHYLOCOCCUS AUREUS  Susceptibility pending  [facility]      Blood culture [[id number]] Collected: 07/25/23 0450    Order Status: Completed Specimen: Blood Updated: 07/28/23 0612     Blood Culture, Routine No Growth to date      No Growth to date      No Growth to date      No Growth to date    MRSA/SA Rapid ID by PCR from Blood culture [[id number]]  (Abnormal) Collected: 07/25/23 0451    Order Status: Completed Updated: 07/27/23 1438     Staph aureus ID by PCR Positive     Methicillin Resistant ID by PCR Negative    Blood culture [[id number]]  (Abnormal)  (Susceptibility)  Collected: 07/23/23 1305    Order Status: Completed Specimen: Blood from Line, Jugular, Internal Right Updated: 07/26/23 1120     Blood Culture, Routine Gram stain aer bottle: Gram positive cocci in clusters resembling Staph      Results called to and read back by:Cong Urena RN 07/24/2023  16:05      STAPHYLOCOCCUS AUREUS  ID consult required at [facility].      Culture, Anaerobe [[id number]] Collected: 07/22/23 0159    Order Status: Completed Specimen: Abscess from Abdomen Updated: 07/25/23 1134     Anaerobic Culture No anaerobes isolated    Blood culture [[id number]]  (Abnormal)  (Susceptibility) Collected: 07/22/23 1058    Order Status: Completed Specimen: Blood from Peripheral, Hand, Right Updated: 07/25/23 1014     Blood Culture, Routine Gram stain aer bottle: Gram positive cocci in clusters resembling Staph      Results called to and read back by:Zaheer Rae RN 07/23/2023  06:41      STAPHYLOCOCCUS AUREUS  ID consult required at [facility] locations.      Aerobic culture [[id number]]  (Abnormal)  (Susceptibility) Collected: 07/22/23 0159    Order Status: Completed Specimen: Abscess from Abdomen Updated: 07/24/23 1113     Aerobic Bacterial Culture STAPHYLOCOCCUS AUREUS  Few      MRSA/SA Rapid ID by PCR from Blood culture [[id number]]  (Abnormal) Collected: 07/22/23 1058    Order Status: Completed Updated: 07/23/23 0557     Staph aureus ID by PCR Positive     Methicillin Resistant ID by PCR Negative    Blood culture [[id number]]     Order Status: Canceled Specimen: Blood             I have reviewed all pertinent labs within the past 24 hours.    Estimated Creatinine Clearance: 99.8 mL/min (based on SCr of 1.2 mg/dL).    Diagnostic Results:  I have reviewed and interpreted all pertinent imaging results/findings within the past 24 hours.

## 2023-07-29 LAB
ANION GAP SERPL CALC-SCNC: 8 MMOL/L (ref 8–16)
ANISOCYTOSIS BLD QL SMEAR: SLIGHT
APTT PPP: 31.6 SEC (ref 21–32)
BACTERIA BLD CULT: ABNORMAL
BASOPHILS # BLD AUTO: ABNORMAL K/UL (ref 0–0.2)
BASOPHILS NFR BLD: 0 % (ref 0–1.9)
BUN SERPL-MCNC: 23 MG/DL (ref 6–20)
CALCIUM SERPL-MCNC: 9 MG/DL (ref 8.7–10.5)
CHLORIDE SERPL-SCNC: 99 MMOL/L (ref 95–110)
CO2 SERPL-SCNC: 26 MMOL/L (ref 23–29)
CREAT SERPL-MCNC: 1.2 MG/DL (ref 0.5–1.4)
DIFFERENTIAL METHOD: ABNORMAL
EOSINOPHIL # BLD AUTO: ABNORMAL K/UL (ref 0–0.5)
EOSINOPHIL NFR BLD: 0 % (ref 0–8)
ERYTHROCYTE [DISTWIDTH] IN BLOOD BY AUTOMATED COUNT: 23.8 % (ref 11.5–14.5)
EST. GFR  (NO RACE VARIABLE): >60 ML/MIN/1.73 M^2
GLUCOSE SERPL-MCNC: 317 MG/DL (ref 70–110)
HCT VFR BLD AUTO: 29.1 % (ref 40–54)
HGB BLD-MCNC: 9.8 G/DL (ref 14–18)
HYPOCHROMIA BLD QL SMEAR: ABNORMAL
IMM GRANULOCYTES # BLD AUTO: ABNORMAL K/UL (ref 0–0.04)
IMM GRANULOCYTES NFR BLD AUTO: ABNORMAL % (ref 0–0.5)
INR PPP: 2 (ref 0.8–1.2)
LDH SERPL L TO P-CCNC: 403 U/L (ref 110–260)
LYMPHOCYTES # BLD AUTO: ABNORMAL K/UL (ref 1–4.8)
LYMPHOCYTES NFR BLD: 13 % (ref 18–48)
MAGNESIUM SERPL-MCNC: 1.8 MG/DL (ref 1.6–2.6)
MCH RBC QN AUTO: 27.4 PG (ref 27–31)
MCHC RBC AUTO-ENTMCNC: 33.7 G/DL (ref 32–36)
MCV RBC AUTO: 81 FL (ref 82–98)
METAMYELOCYTES NFR BLD MANUAL: 1 %
MONOCYTES # BLD AUTO: ABNORMAL K/UL (ref 0.3–1)
MONOCYTES NFR BLD: 7 % (ref 4–15)
MYELOCYTES NFR BLD MANUAL: 1 %
NEUTROPHILS NFR BLD: 78 % (ref 38–73)
NRBC BLD-RTO: 0 /100 WBC
OVALOCYTES BLD QL SMEAR: ABNORMAL
PHOSPHATE SERPL-MCNC: 3.1 MG/DL (ref 2.7–4.5)
PLATELET # BLD AUTO: 532 K/UL (ref 150–450)
PLATELET BLD QL SMEAR: ABNORMAL
PMV BLD AUTO: 9 FL (ref 9.2–12.9)
POCT GLUCOSE: 193 MG/DL (ref 70–110)
POCT GLUCOSE: 226 MG/DL (ref 70–110)
POCT GLUCOSE: 239 MG/DL (ref 70–110)
POCT GLUCOSE: 333 MG/DL (ref 70–110)
POCT GLUCOSE: 337 MG/DL (ref 70–110)
POIKILOCYTOSIS BLD QL SMEAR: SLIGHT
POLYCHROMASIA BLD QL SMEAR: ABNORMAL
POTASSIUM SERPL-SCNC: 4.5 MMOL/L (ref 3.5–5.1)
PROTHROMBIN TIME: 21 SEC (ref 9–12.5)
RBC # BLD AUTO: 3.58 M/UL (ref 4.6–6.2)
SODIUM SERPL-SCNC: 133 MMOL/L (ref 136–145)
TARGETS BLD QL SMEAR: ABNORMAL
WBC # BLD AUTO: 32.05 K/UL (ref 3.9–12.7)

## 2023-07-29 PROCEDURE — 25000003 PHARM REV CODE 250: Performed by: INTERNAL MEDICINE

## 2023-07-29 PROCEDURE — 83615 LACTATE (LD) (LDH) ENZYME: CPT | Performed by: HOSPITALIST

## 2023-07-29 PROCEDURE — 93750 INTERROGATION VAD IN PERSON: CPT | Mod: ,,, | Performed by: INTERNAL MEDICINE

## 2023-07-29 PROCEDURE — 80048 BASIC METABOLIC PNL TOTAL CA: CPT | Performed by: HOSPITALIST

## 2023-07-29 PROCEDURE — 63600175 PHARM REV CODE 636 W HCPCS: Performed by: INTERNAL MEDICINE

## 2023-07-29 PROCEDURE — 99233 PR SUBSEQUENT HOSPITAL CARE,LEVL III: ICD-10-PCS | Mod: 95,,, | Performed by: NURSE PRACTITIONER

## 2023-07-29 PROCEDURE — 27000207 HC ISOLATION

## 2023-07-29 PROCEDURE — 94761 N-INVAS EAR/PLS OXIMETRY MLT: CPT

## 2023-07-29 PROCEDURE — 99233 SBSQ HOSP IP/OBS HIGH 50: CPT | Mod: 95,,, | Performed by: NURSE PRACTITIONER

## 2023-07-29 PROCEDURE — 85007 BL SMEAR W/DIFF WBC COUNT: CPT | Performed by: NURSE PRACTITIONER

## 2023-07-29 PROCEDURE — 99233 SBSQ HOSP IP/OBS HIGH 50: CPT | Mod: ,,, | Performed by: STUDENT IN AN ORGANIZED HEALTH CARE EDUCATION/TRAINING PROGRAM

## 2023-07-29 PROCEDURE — 83735 ASSAY OF MAGNESIUM: CPT | Performed by: HOSPITALIST

## 2023-07-29 PROCEDURE — 63600175 PHARM REV CODE 636 W HCPCS: Performed by: HOSPITALIST

## 2023-07-29 PROCEDURE — 25000003 PHARM REV CODE 250: Performed by: HOSPITALIST

## 2023-07-29 PROCEDURE — 27000248 HC VAD-ADDITIONAL DAY

## 2023-07-29 PROCEDURE — 25000003 PHARM REV CODE 250: Performed by: NURSE PRACTITIONER

## 2023-07-29 PROCEDURE — 93750 PR INTERROGATE VENT ASSIST DEV, IN PERSON, W PHYSICIAN ANALYSIS: ICD-10-PCS | Mod: ,,, | Performed by: INTERNAL MEDICINE

## 2023-07-29 PROCEDURE — 20600001 HC STEP DOWN PRIVATE ROOM

## 2023-07-29 PROCEDURE — 85730 THROMBOPLASTIN TIME PARTIAL: CPT | Performed by: STUDENT IN AN ORGANIZED HEALTH CARE EDUCATION/TRAINING PROGRAM

## 2023-07-29 PROCEDURE — 36415 COLL VENOUS BLD VENIPUNCTURE: CPT | Performed by: STUDENT IN AN ORGANIZED HEALTH CARE EDUCATION/TRAINING PROGRAM

## 2023-07-29 PROCEDURE — 87040 BLOOD CULTURE FOR BACTERIA: CPT | Mod: 59 | Performed by: STUDENT IN AN ORGANIZED HEALTH CARE EDUCATION/TRAINING PROGRAM

## 2023-07-29 PROCEDURE — 85610 PROTHROMBIN TIME: CPT | Performed by: HOSPITALIST

## 2023-07-29 PROCEDURE — 84100 ASSAY OF PHOSPHORUS: CPT | Performed by: HOSPITALIST

## 2023-07-29 PROCEDURE — 99233 PR SUBSEQUENT HOSPITAL CARE,LEVL III: ICD-10-PCS | Mod: ,,, | Performed by: STUDENT IN AN ORGANIZED HEALTH CARE EDUCATION/TRAINING PROGRAM

## 2023-07-29 PROCEDURE — 85027 COMPLETE CBC AUTOMATED: CPT | Performed by: NURSE PRACTITIONER

## 2023-07-29 RX ORDER — INSULIN ASPART 100 [IU]/ML
1-10 INJECTION, SOLUTION INTRAVENOUS; SUBCUTANEOUS
Status: DISCONTINUED | OUTPATIENT
Start: 2023-07-29 | End: 2023-07-30

## 2023-07-29 RX ORDER — LANOLIN ALCOHOL/MO/W.PET/CERES
400 CREAM (GRAM) TOPICAL ONCE
Status: COMPLETED | OUTPATIENT
Start: 2023-07-29 | End: 2023-07-29

## 2023-07-29 RX ORDER — INSULIN ASPART 100 [IU]/ML
30 INJECTION, SOLUTION INTRAVENOUS; SUBCUTANEOUS
Status: DISCONTINUED | OUTPATIENT
Start: 2023-07-30 | End: 2023-07-31

## 2023-07-29 RX ORDER — INSULIN ASPART 100 [IU]/ML
24 INJECTION, SOLUTION INTRAVENOUS; SUBCUTANEOUS
Status: DISCONTINUED | OUTPATIENT
Start: 2023-07-29 | End: 2023-07-29

## 2023-07-29 RX ADMIN — LEVETIRACETAM 1000 MG: 500 TABLET, FILM COATED ORAL at 08:07

## 2023-07-29 RX ADMIN — FUROSEMIDE 80 MG: 80 TABLET ORAL at 08:07

## 2023-07-29 RX ADMIN — MILRINONE LACTATE 0.25 MCG/KG/MIN: 0.2 INJECTION, SOLUTION INTRAVENOUS at 10:07

## 2023-07-29 RX ADMIN — HYDROCODONE BITARTRATE AND ACETAMINOPHEN 1 TABLET: 5; 325 TABLET ORAL at 10:07

## 2023-07-29 RX ADMIN — MIRTAZAPINE 15 MG: 15 TABLET, FILM COATED ORAL at 08:07

## 2023-07-29 RX ADMIN — INSULIN ASPART 24 UNITS: 100 INJECTION, SOLUTION INTRAVENOUS; SUBCUTANEOUS at 08:07

## 2023-07-29 RX ADMIN — HYDROCODONE BITARTRATE AND ACETAMINOPHEN 1 TABLET: 5; 325 TABLET ORAL at 08:07

## 2023-07-29 RX ADMIN — POTASSIUM CHLORIDE 40 MEQ: 1500 TABLET, EXTENDED RELEASE ORAL at 08:07

## 2023-07-29 RX ADMIN — INSULIN ASPART 4 UNITS: 100 INJECTION, SOLUTION INTRAVENOUS; SUBCUTANEOUS at 08:07

## 2023-07-29 RX ADMIN — ASPIRIN 81 MG: 81 TABLET, COATED ORAL at 08:07

## 2023-07-29 RX ADMIN — INSULIN ASPART 2 UNITS: 100 INJECTION, SOLUTION INTRAVENOUS; SUBCUTANEOUS at 12:07

## 2023-07-29 RX ADMIN — BUSPIRONE HYDROCHLORIDE 10 MG: 10 TABLET ORAL at 08:07

## 2023-07-29 RX ADMIN — INSULIN ASPART 8 UNITS: 100 INJECTION, SOLUTION INTRAVENOUS; SUBCUTANEOUS at 05:07

## 2023-07-29 RX ADMIN — CEFAZOLIN 8 G: 2 INJECTION, POWDER, FOR SOLUTION INTRAMUSCULAR; INTRAVENOUS at 10:07

## 2023-07-29 RX ADMIN — DEXAMETHASONE 6 MG: 4 TABLET ORAL at 08:07

## 2023-07-29 RX ADMIN — SENNOSIDES AND DOCUSATE SODIUM 2 TABLET: 50; 8.6 TABLET ORAL at 08:07

## 2023-07-29 RX ADMIN — INSULIN ASPART 24 UNITS: 100 INJECTION, SOLUTION INTRAVENOUS; SUBCUTANEOUS at 05:07

## 2023-07-29 RX ADMIN — Medication 400 MG: at 08:07

## 2023-07-29 RX ADMIN — INSULIN ASPART 24 UNITS: 100 INJECTION, SOLUTION INTRAVENOUS; SUBCUTANEOUS at 12:07

## 2023-07-29 RX ADMIN — WARFARIN SODIUM 4.5 MG: 2.5 TABLET ORAL at 05:07

## 2023-07-29 NOTE — PROCEDURES
Interrogation of Ventricular assist device was performed with physician analysis of device parameters and review of device function. I have personally reviewed the interrogation findings and agree with findings as stated.   Procedure: Device Interrogation Including analysis of device parameters  Current Settings: Ventricular Assist Device  Review of device function is stable  TXP LVAD INTERROGATIONS 7/29/2023 7/29/2023 7/28/2023 7/28/2023 7/28/2023 7/28/2023 7/28/2023   Type HeartMate3 HeartMate3 HeartMate3 HeartMate3 HeartMate3 HeartMate3 HeartMate3   Flow 4.2 4.1 4.1 4.2 4.3 4.1 4.2   Speed 5100 5100 5100 5100 5100 5100 5100   PI 5.2 5.0 5.8 4.1 6.0 6.1 6.0   Power (Serna) 3.6 3.7 3.8 3.7 3.7 3.8 3.8   LSL 4700 4700 4700 4700 4700 4700 4700   Low Flow Alarm - - - - - - -   High Power Alarm - - - - - - -   Pulsatility Pulse Pulse Pulse Pulse Pulse Pulse Pulse      Natalya Villatoro MD

## 2023-07-29 NOTE — NURSING
Rt IJ removed per order, pressure applied then dressing applied, pt tolerated well, no bleeding noted at site. Blood culture collected after Rt IJ removal.    53 M h/o HTN presented with intermittent chest pressure for 2 weeks. Reports that it is located at the left chest wall, and is worse when he is physically active, 7/10, associated with dyspnea. Has not seen his PMD for over a year.  In the ED, EKG was SR. Cardiac cath showed nonobstructive CAD, TTE LVEF 40-45%.     1) NICM with new diagnosis of systolic CHF  - s/p cath no intervention needed or obstruction  - per cardio cleared for discharge   - change lasix to po daily  - continue coreg and lisinopril    2) HTN  - continue coreg and lisinopril    3) JESSICA  - outpatient work up     Pt stable for discharge 53 M h/o HTN presented with intermittent chest pressure.  Had cardiac cath yesterday:  Normal coronaries, sever pulmonary htn.   Plan as per PCP.      Cardiology recommendations      1: NICM: on load reduction therapy ( coreg /lasix/lisnopril)  2: HTN: above goal BB rxn  3: SOB h/o asthma ; echo as above , nebs prn  4: r/o JESSICA- recc sleep study as outpatient 54 yo M here for ACS. 54 yo male presents with intermittent chest pain for the past two weeks tni(-) cardiac cath nonobstructive CAD TTE LVEF 40-45%. clinically improved overnight    Imp:      1: NICM: on load reduction therapy ( coreg /lasix/lisnopril)  2: HTN: above goal BB rxn  3: SOB h/o asthma ; echo as above , nebs prn  4: r/o JESSICA- recc sleep study as outpatient

## 2023-07-29 NOTE — SUBJECTIVE & OBJECTIVE
**Interval History: No complaints or overnight events. WCC trending down, afebrile, but blood cxs through 7/22 +ve for MSSA. Repeat cxs done 7/28 show NGTD, will repeat today after pulling RIJ TLC per ID rec. Will also get a TTE to eval for endocarditis. Euvolemic on exam, CVP 11    Continuous Infusions:   milrinone 20mg/100ml D5W (200mcg/ml) 0.25 mcg/kg/min (07/28/23 1947)     Scheduled Meds:   aspirin  81 mg Oral Daily    busPIRone  10 mg Oral BID    ceFAZolin (ANCEF) 8 g in dextrose 5 % (D5W) 500 mL CONTINUOUS INFUSION  8 g Intravenous Q24H    dexAMETHasone  6 mg Oral Daily    furosemide  80 mg Oral Daily    insulin aspart U-100  24 Units Subcutaneous TIDWM    insulin detemir U-100  24 Units Subcutaneous BID    levETIRAcetam  1,000 mg Oral BID    magnesium oxide  400 mg Oral Once    mirtazapine  15 mg Oral Nightly    mupirocin   Nasal BID    polyethylene glycol  17 g Oral Daily    potassium chloride  40 mEq Oral BID    senna-docusate 8.6-50 mg  2 tablet Oral BID    warfarin  4.5 mg Oral Daily     PRN Meds:acetaminophen, dextrose 10%, dextrose 10%, glucagon (human recombinant), glucose, glucose, HYDROcodone-acetaminophen, insulin aspart U-100    Review of patient's allergies indicates:   Allergen Reactions    Bumex [bumetanide] Hives    Torsemide Hives     Objective:     Vital Signs (Most Recent):  Temp: 98.5 °F (36.9 °C) (07/29/23 0750)  Pulse: (!) 113 (07/29/23 0803)  Resp: 18 (07/29/23 0750)  BP: 103/74 (07/29/23 0752)  SpO2: 99 % (07/29/23 0415) Vital Signs (24h Range):  Temp:  [98.2 °F (36.8 °C)-98.9 °F (37.2 °C)] 98.5 °F (36.9 °C)  Pulse:  [] 113  Resp:  [16-20] 18  SpO2:  [96 %-99 %] 99 %  BP: ()/(0-74) 103/74     Patient Vitals for the past 72 hrs (Last 3 readings):   Weight   07/29/23 0432 92.3 kg (203 lb 7.8 oz)   07/27/23 0745 92.9 kg (204 lb 12.9 oz)     Body mass index is 25.43 kg/m².      Intake/Output Summary (Last 24 hours) at 7/29/2023 0830  Last data filed at 7/29/2023 0824  Gross per  24 hour   Intake 950 ml   Output 3125 ml   Net -2175 ml       Hemodynamic Parameters:       Telemetry: ST       Physical Exam  Constitutional:       Appearance: Normal appearance.   HENT:      Head: Normocephalic and atraumatic.   Eyes:      Conjunctiva/sclera: Conjunctivae normal.      Pupils: Pupils are equal, round, and reactive to light.   Neck:      Comments: Do not appreciate elevated neck veins  Cardiovascular:      Rate and Rhythm: Regular rhythm. Tachycardia present.      Comments: Smooth VAD hum  Pulmonary:      Effort: Pulmonary effort is normal.      Breath sounds: Normal breath sounds.   Abdominal:      General: Bowel sounds are normal.      Palpations: Abdomen is soft.   Musculoskeletal:         General: No swelling. Normal range of motion.      Cervical back: Normal range of motion and neck supple.   Skin:     General: Skin is warm and dry.      Capillary Refill: Capillary refill takes 2 to 3 seconds.   Neurological:      General: No focal deficit present.      Mental Status: He is alert and oriented to person, place, and time.   Psychiatric:         Mood and Affect: Mood normal.         Behavior: Behavior normal.         Thought Content: Thought content normal.         Judgment: Judgment normal.            Significant Labs:  CBC:  Recent Labs   Lab 07/27/23  0401 07/28/23  0431 07/29/23  0741   WBC 41.97* 42.35* 32.05*   RBC 3.57* 3.38* 3.58*   HGB 9.7* 9.2* 9.8*   HCT 28.3* 27.3* 29.1*   * 549* 532*   MCV 79* 81* 81*   MCH 27.2 27.2 27.4   MCHC 34.3 33.7 33.7     BNP:  Recent Labs   Lab 07/24/23  0322 07/26/23  0422 07/28/23  0431   * 273* 337*     CMP:  Recent Labs   Lab 07/24/23  0322 07/25/23  0428 07/26/23  0422 07/27/23  0401 07/27/23  1307 07/28/23  0431 07/29/23  0443   *   < > 369*   < > 248* 248* 317*   CALCIUM 9.4   < > 9.0   < > 9.5 9.4 9.0   ALBUMIN 2.1*  --  2.1*  --   --  2.2*  --    PROT 7.4  --  7.2  --   --  6.7  --    *   < > 132*   < > 137 135* 133*   K  3.8   < > 4.1   < > 3.7 3.9 4.5   CO2 30*   < > 26   < > 29 28 26   CL 90*   < > 93*   < > 95 97 99   BUN 36*   < > 31*   < > 26* 29* 23*   CREATININE 1.5*   < > 1.3   < > 1.2 1.2 1.2   ALKPHOS 158*  --  143*  --   --  121  --    ALT 32  --  58*  --   --  30  --    AST 47*  --  79*  --   --  32  --    BILITOT 1.6*  --  1.0  --   --  0.7  --     < > = values in this interval not displayed.      Coagulation:   Recent Labs   Lab 07/27/23  0401 07/27/23  1307 07/27/23  1910 07/28/23  0431 07/29/23 0443   INR 1.8*  --   --  2.0* 2.0*   APTT 71.5*   < > 55.5* 77.1* 31.6    < > = values in this interval not displayed.     LDH:  Recent Labs   Lab 07/27/23  0401 07/28/23  0431 07/29/23 0443   * 418* 403*     Microbiology:  Microbiology Results (last 7 days)       Procedure Component Value Units Date/Time    Blood culture [[id number]]     Order Status: Sent Specimen: Blood     Blood culture [[id number]]     Order Status: Sent Specimen: Blood     Blood culture [[id number]] Collected: 07/25/23 0450    Order Status: Completed Specimen: Blood Updated: 07/29/23 0612     Blood Culture, Routine No Growth to date      No Growth to date      No Growth to date      No Growth to date      No Growth to date    Blood culture [[id number]] Collected: 07/28/23 0939    Order Status: Completed Specimen: Blood from Antecubital, Left Arm Updated: 07/28/23 1715     Blood Culture, Routine No Growth to date    Blood culture [[id number]] Collected: 07/28/23 0940    Order Status: Completed Specimen: Blood from Peripheral, Left Hand Updated: 07/28/23 1715     Blood Culture, Routine No Growth to date    Blood culture [[id number]]  (Abnormal) Collected: 07/25/23 0451    Order Status: Completed Specimen: Blood Updated: 07/28/23 0906     Blood Culture, Routine Gram stain aer bottle: Gram positive cocci in clusters resembling Staph      Results called to and read back by:Laura Kilgore RN 07/27/2023  13:01      STAPHYLOCOCCUS AUREUS  Susceptibility  pending  ID consult required at [facility].      MRSA/SA Rapid ID by PCR from Blood culture [[id number]]  (Abnormal) Collected: 07/25/23 0451    Order Status: Completed Updated: 07/27/23 1438     Staph aureus ID by PCR Positive     Methicillin Resistant ID by PCR Negative    Blood culture [[id number]]  (Abnormal)  (Susceptibility) Collected: 07/23/23 1305    Order Status: Completed Specimen: Blood from Line, Jugular, Internal Right Updated: 07/26/23 1120     Blood Culture, Routine Gram stain aer bottle: Gram positive cocci in clusters resembling Staph      Results called to and read back by:Cong Urena RN 07/24/2023  16:05      STAPHYLOCOCCUS AUREUS  ID consult required at [facility].      Culture, Anaerobe [[id number]] Collected: 07/22/23 0159    Order Status: Completed Specimen: Abscess from Abdomen Updated: 07/25/23 1134     Anaerobic Culture No anaerobes isolated    Blood culture [[id number]]  (Abnormal)  (Susceptibility) Collected: 07/22/23 1058    Order Status: Completed Specimen: Blood from Peripheral, Hand, Right Updated: 07/25/23 1014     Blood Culture, Routine Gram stain aer bottle: Gram positive cocci in clusters resembling Staph      Results called to and read back by:Zaheer Rae RN 07/23/2023  06:41      STAPHYLOCOCCUS AUREUS  ID consult required at [facility].      Aerobic culture [[id number]]  (Abnormal)  (Susceptibility) Collected: 07/22/23 0159    Order Status: Completed Specimen: Abscess from Abdomen Updated: 07/24/23 1113     Aerobic Bacterial Culture STAPHYLOCOCCUS AUREUS  Few      MRSA/SA Rapid ID by PCR from Blood culture [[id number]]  (Abnormal) Collected: 07/22/23 1058    Order Status: Completed Updated: 07/23/23 0557     Staph aureus ID by PCR Positive     Methicillin Resistant ID by PCR Negative            I have reviewed all pertinent labs within the past 24 hours.    Estimated Creatinine Clearance:  99.8 mL/min (based on SCr of 1.2 mg/dL).    Diagnostic Results:  I have reviewed and interpreted all pertinent imaging results/findings within the past 24 hours.

## 2023-07-29 NOTE — SUBJECTIVE & OBJECTIVE
Interval History: No adverse events.    Review of Systems   All other systems reviewed and are negative.  Objective:     Vital Signs (Most Recent):  Temp: 98.3 °F (36.8 °C) (07/28/23 1950)  Pulse: 83 (07/28/23 1529)  Resp: 18 (07/28/23 1950)  BP: (!) 78/0 (07/28/23 1950)  SpO2: 98 % (07/28/23 1950) Vital Signs (24h Range):  Temp:  [98.1 °F (36.7 °C)-98.9 °F (37.2 °C)] 98.3 °F (36.8 °C)  Pulse:  [] 83  Resp:  [16-20] 18  SpO2:  [96 %-99 %] 98 %  BP: ()/(0-72) 78/0     Weight: 92.9 kg (204 lb 12.9 oz)  Body mass index is 25.6 kg/m².    Estimated Creatinine Clearance: 99.8 mL/min (based on SCr of 1.2 mg/dL).     Physical Exam  Vitals and nursing note reviewed.   Constitutional:       General: He is not in acute distress.     Appearance: He is well-developed. He is not diaphoretic.   HENT:      Head: Normocephalic and atraumatic.      Right Ear: External ear normal.      Left Ear: External ear normal.      Nose: Nose normal.      Mouth/Throat:      Pharynx: No oropharyngeal exudate.   Eyes:      General: No scleral icterus.        Right eye: No discharge.         Left eye: No discharge.      Conjunctiva/sclera: Conjunctivae normal.      Pupils: Pupils are equal, round, and reactive to light.   Neck:      Thyroid: No thyromegaly.      Vascular: No JVD.      Trachea: No tracheal deviation.   Cardiovascular:      Comments: hum  Pulmonary:      Effort: Pulmonary effort is normal. No respiratory distress.      Breath sounds: Normal breath sounds. No stridor. No wheezing or rales.   Chest:      Chest wall: No tenderness.   Abdominal:      General: Bowel sounds are normal. There is no distension.      Palpations: Abdomen is soft. There is no mass.      Tenderness: There is no abdominal tenderness. There is no guarding or rebound.      Comments: Drive line in place.   Musculoskeletal:         General: No tenderness. Normal range of motion.      Cervical back: Normal range of motion and neck supple.    Lymphadenopathy:      Cervical: No cervical adenopathy.   Skin:     General: Skin is warm.      Coloration: Skin is not pale.      Findings: No erythema or rash.   Neurological:      Mental Status: He is alert and oriented to person, place, and time.      Cranial Nerves: No cranial nerve deficit.      Motor: No abnormal muscle tone.      Coordination: Coordination normal.      Deep Tendon Reflexes: Reflexes are normal and symmetric. Reflexes normal.   Psychiatric:         Behavior: Behavior normal.         Thought Content: Thought content normal.         Judgment: Judgment normal.        Significant Labs:   Microbiology Results (last 7 days)       Procedure Component Value Units Date/Time    Blood culture [[id number]] Collected: 07/28/23 0939    Order Status: Completed Specimen: Blood from Antecubital, Left Arm Updated: 07/28/23 1715     Blood Culture, Routine No Growth to date    Blood culture [[id number]] Collected: 07/28/23 0940    Order Status: Completed Specimen: Blood from Peripheral, Left Hand Updated: 07/28/23 1715     Blood Culture, Routine No Growth to date    Blood culture [[id number]]  (Abnormal) Collected: 07/25/23 0451    Order Status: Completed Specimen: Blood Updated: 07/28/23 0906     Blood Culture, Routine Gram stain aer bottle: Gram positive cocci in clusters resembling Staph      Results called to and read back by:Laura Kilgore RN 07/27/2023  13:01      STAPHYLOCOCCUS AUREUS  Susceptibility pending  [facility] consult [facility]      Blood culture [[id number]] Collected: 07/25/23 0450    Order Status: Completed Specimen: Blood Updated: 07/28/23 0612     Blood Culture, Routine No Growth to date      No Growth to date      No Growth to date      No Growth to date    MRSA/SA Rapid ID by PCR from Blood culture [[id number]]  (Abnormal) Collected: 07/25/23 0451    Order Status: Completed Updated: 07/27/23 1438     Staph aureus ID by PCR Positive     Methicillin Resistant  ID by PCR Negative    Blood culture [[id number]]  (Abnormal)  (Susceptibility) Collected: 07/23/23 1305    Order Status: Completed Specimen: Blood from Line, Jugular, Internal Right Updated: 07/26/23 1120     Blood Culture, Routine Gram stain aer bottle: Gram positive cocci in clusters resembling Staph      Results called to and read back by:Cong Urena RN 07/24/2023  16:05      STAPHYLOCOCCUS AUREUS  ID consult required at [facility].      Culture, Anaerobe [[id number]] Collected: 07/22/23 0159    Order Status: Completed Specimen: Abscess from Abdomen Updated: 07/25/23 1134     Anaerobic Culture No anaerobes isolated    Blood culture [[id number]]  (Abnormal)  (Susceptibility) Collected: 07/22/23 1058    Order Status: Completed Specimen: Blood from Peripheral, Hand, Right Updated: 07/25/23 1014     Blood Culture, Routine Gram stain aer bottle: Gram positive cocci in clusters resembling Staph      Results called to and read back by:Zaheer Rae RN 07/23/2023  06:41      STAPHYLOCOCCUS AUREUS  ID consult required at [facility].      Aerobic culture [[id number]]  (Abnormal)  (Susceptibility) Collected: 07/22/23 0159    Order Status: Completed Specimen: Abscess from Abdomen Updated: 07/24/23 1113     Aerobic Bacterial Culture STAPHYLOCOCCUS AUREUS  Few      MRSA/SA Rapid ID by PCR from Blood culture [[id number]]  (Abnormal) Collected: 07/22/23 1058    Order Status: Completed Updated: 07/23/23 0557     Staph aureus ID by PCR Positive     Methicillin Resistant ID by PCR Negative    Blood culture [[id number]]     Order Status: Canceled Specimen: Blood             Significant Imaging: I have reviewed all pertinent imaging results/findings within the past 24 hours.

## 2023-07-29 NOTE — ASSESSMENT & PLAN NOTE
38 year old male with a history of MSSA infection of his drive line exit site.  He is admitted with sepsis.  Blood cultures from July 22 and July 25 are positive.  Cefazolin dose increased to 8 gram over 24 hours.  Plan    1. Continue IV cefazolin.    2. Blood cultures repeated today.    3. Anticipate 6 weeks of IV therapy followed by oral suppression.

## 2023-07-29 NOTE — PROGRESS NOTES
[facility] - Cardiology Stepdown  Heart Transplant  Progress Note    Patient Name: Kevan Queen  MRN: [id number]  Admission Date: 7/22/2023  Hospital Length of Stay: 7 days  Attending Physician: Natalya Villatoro MD  Primary Care Provider: ORALIA Cline  Principal Problem:Sepsis    Subjective:     **Interval History: No complaints or overnight events. WCC trending down, afebrile, but blood cxs through 7/22 +ve for MSSA. Repeat cxs done 7/28 show NGTD, will repeat today after pulling RIJ TLC per ID rec. Will also get a TTE to eval for endocarditis. Euvolemic on exam, CVP 11    Continuous Infusions:   milrinone 20mg/100ml D5W (200mcg/ml) 0.25 mcg/kg/min (07/28/23 1947)     Scheduled Meds:   aspirin  81 mg Oral Daily    busPIRone  10 mg Oral BID    ceFAZolin (ANCEF) 8 g in dextrose 5 % (D5W) 500 mL CONTINUOUS INFUSION  8 g Intravenous Q24H    dexAMETHasone  6 mg Oral Daily    furosemide  80 mg Oral Daily    insulin aspart U-100  24 Units Subcutaneous TIDWM    insulin detemir U-100  24 Units Subcutaneous BID    levETIRAcetam  1,000 mg Oral BID    magnesium oxide  400 mg Oral Once    mirtazapine  15 mg Oral Nightly    mupirocin   Nasal BID    polyethylene glycol  17 g Oral Daily    potassium chloride  40 mEq Oral BID    senna-docusate 8.6-50 mg  2 tablet Oral BID    warfarin  4.5 mg Oral Daily     PRN Meds:acetaminophen, dextrose 10%, dextrose 10%, glucagon (human recombinant), glucose, glucose, HYDROcodone-acetaminophen, insulin aspart U-100    Review of patient's allergies indicates:   Allergen Reactions    Bumex [bumetanide] Hives    Torsemide Hives     Objective:     Vital Signs (Most Recent):  Temp: 98.5 °F (36.9 °C) (07/29/23 0750)  Pulse: (!) 113 (07/29/23 0803)  Resp: 18 (07/29/23 0750)  BP: 103/74 (07/29/23 0752)  SpO2: 99 % (07/29/23 3315) Vital Signs (24h Range):  Temp:  [98.2 °F (36.8 °C)-98.9 °F (37.2 °C)] 98.5 °F (36.9 °C)  Pulse:  [] 113  Resp:  [16-20] 18  SpO2:  [96 %-99 %] 99  %  BP: ()/(0-74) 103/74     Patient Vitals for the past 72 hrs (Last 3 readings):   Weight   07/29/23 0432 92.3 kg (203 lb 7.8 oz)   07/27/23 0745 92.9 kg (204 lb 12.9 oz)     Body mass index is 25.43 kg/m².      Intake/Output Summary (Last 24 hours) at 7/29/2023 0830  Last data filed at 7/29/2023 0824  Gross per 24 hour   Intake 950 ml   Output 3125 ml   Net -2175 ml       Hemodynamic Parameters:       Telemetry: ST       Physical Exam  Constitutional:       Appearance: Normal appearance.   HENT:      Head: Normocephalic and atraumatic.   Eyes:      Conjunctiva/sclera: Conjunctivae normal.      Pupils: Pupils are equal, round, and reactive to light.   Neck:      Comments: Do not appreciate elevated neck veins  Cardiovascular:      Rate and Rhythm: Regular rhythm. Tachycardia present.      Comments: Smooth VAD hum  Pulmonary:      Effort: Pulmonary effort is normal.      Breath sounds: Normal breath sounds.   Abdominal:      General: Bowel sounds are normal.      Palpations: Abdomen is soft.   Musculoskeletal:         General: No swelling. Normal range of motion.      Cervical back: Normal range of motion and neck supple.   Skin:     General: Skin is warm and dry.      Capillary Refill: Capillary refill takes 2 to 3 seconds.   Neurological:      General: No focal deficit present.      Mental Status: He is alert and oriented to person, place, and time.   Psychiatric:         Mood and Affect: Mood normal.         Behavior: Behavior normal.         Thought Content: Thought content normal.         Judgment: Judgment normal.            Significant Labs:  CBC:  Recent Labs   Lab 07/27/23  0401 07/28/23  0431 07/29/23  0741   WBC 41.97* 42.35* 32.05*   RBC 3.57* 3.38* 3.58*   HGB 9.7* 9.2* 9.8*   HCT 28.3* 27.3* 29.1*   * 549* 532*   MCV 79* 81* 81*   MCH 27.2 27.2 27.4   MCHC 34.3 33.7 33.7     BNP:  Recent Labs   Lab 07/24/23  0322 07/26/23  0422 07/28/23  0431   * 273* 337*     CMP:  Recent Labs    Lab 07/24/23  0322 07/25/23  0428 07/26/23  0422 07/27/23  0401 07/27/23  1307 07/28/23  0431 07/29/23  0443   *   < > 369*   < > 248* 248* 317*   CALCIUM 9.4   < > 9.0   < > 9.5 9.4 9.0   ALBUMIN 2.1*  --  2.1*  --   --  2.2*  --    PROT 7.4  --  7.2  --   --  6.7  --    *   < > 132*   < > 137 135* 133*   K 3.8   < > 4.1   < > 3.7 3.9 4.5   CO2 30*   < > 26   < > 29 28 26   CL 90*   < > 93*   < > 95 97 99   BUN 36*   < > 31*   < > 26* 29* 23*   CREATININE 1.5*   < > 1.3   < > 1.2 1.2 1.2   ALKPHOS 158*  --  143*  --   --  121  --    ALT 32  --  58*  --   --  30  --    AST 47*  --  79*  --   --  32  --    BILITOT 1.6*  --  1.0  --   --  0.7  --     < > = values in this interval not displayed.      Coagulation:   Recent Labs   Lab 07/27/23  0401 07/27/23  1307 07/27/23  1910 07/28/23 0431 07/29/23 0443   INR 1.8*  --   --  2.0* 2.0*   APTT 71.5*   < > 55.5* 77.1* 31.6    < > = values in this interval not displayed.     LDH:  Recent Labs   Lab 07/27/23  0401 07/28/23  0431 07/29/23 0443   * 418* 403*     Microbiology:  Microbiology Results (last 7 days)       Procedure Component Value Units Date/Time    Blood culture [[id number]]     Order Status: Sent Specimen: Blood     Blood culture [[id number]]     Order Status: Sent Specimen: Blood     Blood culture [[id number]] Collected: 07/25/23 0450    Order Status: Completed Specimen: Blood Updated: 07/29/23 0612     Blood Culture, Routine No Growth to date      No Growth to date      No Growth to date      No Growth to date      No Growth to date    Blood culture [[id number]] Collected: 07/28/23 0939    Order Status: Completed Specimen: Blood from Antecubital, Left Arm Updated: 07/28/23 1715     Blood Culture, Routine No Growth to date    Blood culture [[id number]] Collected: 07/28/23 0940    Order Status: Completed Specimen: Blood from Peripheral, Left Hand Updated: 07/28/23 1715     Blood Culture, Routine No Growth to date    Blood culture [[id number]]   (Abnormal) Collected: 07/25/23 0451    Order Status: Completed Specimen: Blood Updated: 07/28/23 0906     Blood Culture, Routine Gram stain aer bottle: Gram positive cocci in clusters resembling Staph      Results called to and read back by:Laura Kilgore RN 07/27/2023  13:01      STAPHYLOCOCCUS AUREUS  Susceptibility pending  ID consult required at [facility].      MRSA/SA Rapid ID by PCR from Blood culture [[id number]]  (Abnormal) Collected: 07/25/23 0451    Order Status: Completed Updated: 07/27/23 1438     Staph aureus ID by PCR Positive     Methicillin Resistant ID by PCR Negative    Blood culture [[id number]]  (Abnormal)  (Susceptibility) Collected: 07/23/23 1305    Order Status: Completed Specimen: Blood from Line, Jugular, Internal Right Updated: 07/26/23 1120     Blood Culture, Routine Gram stain aer bottle: Gram positive cocci in clusters resembling Staph      Results called to and read back by:Cong Urena RN 07/24/2023  16:05      STAPHYLOCOCCUS AUREUS  ID consult required at [facility].      Culture, Anaerobe [[id number]] Collected: 07/22/23 0159    Order Status: Completed Specimen: Abscess from Abdomen Updated: 07/25/23 1134     Anaerobic Culture No anaerobes isolated    Blood culture [[id number]]  (Abnormal)  (Susceptibility) Collected: 07/22/23 1058    Order Status: Completed Specimen: Blood from Peripheral, Hand, Right Updated: 07/25/23 1014     Blood Culture, Routine Gram stain aer bottle: Gram positive cocci in clusters resembling Staph      Results called to and read back by:Zaheer Rae RN 07/23/2023  06:41      STAPHYLOCOCCUS AUREUS  ID consult required at [facility].      Aerobic culture [[id number]]  (Abnormal)  (Susceptibility) Collected: 07/22/23 0159    Order Status: Completed Specimen: Abscess from Abdomen Updated: 07/24/23 1113     Aerobic Bacterial Culture STAPHYLOCOCCUS AUREUS  Few      MRSA/SA Rapid  ID by PCR from Blood culture [[id number]]  (Abnormal) Collected: 07/22/23 1058    Order Status: Completed Updated: 07/23/23 0557     Staph aureus ID by PCR Positive     Methicillin Resistant ID by PCR Negative            I have reviewed all pertinent labs within the past 24 hours.    Estimated Creatinine Clearance: 99.8 mL/min (based on SCr of 1.2 mg/dL).    Diagnostic Results:  I have reviewed and interpreted all pertinent imaging results/findings within the past 24 hours.    Assessment and Plan:     Patient is a 36 yo black male with stage d HFrEF, NICM, ? 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 He underwent RVAD removal and chest closure 6/30/2022.  He was weaned off  but he had to restarted due to RVF. He was eventually transitioned to milrinone (secondary to  shortage) now on 0.25 mcg/kg/min.  Patient also has history of driveline infection.  Patient presented to the emergency room today because of pleuritic chest pain that has been going on for the last 3 days however it was more intense today.  Also complains of having shortness of breath and fevers associated with it.  He was spiking fever of 102 in the emergency room with elevated white blood cell count up to 17,000. He was treated with Zosyn.  He was sent here for admission.  Patient was admitted a month back for COVID infection for which he received remdesivir for 3 days.  He is on doxycycline for chronic driveline infection.  He is on Milrinone for RV failure.  Patient denies having any low flow alarms on the pump.  Also denies having any lower extremity edema, increased discharge from his driveline site.  Patient has ground-glass opacities on imaging from outside hospital      * Sepsis  Patient presents with elevated fevers, white blood cell count, elevated lactic acid.    ID  following. Possible Secondary to COVID-19 infection.    Continue:    1. Remdesivir for 5 days(day5).   2. Dexamethasone for 10 days(Day6).  History of MSSA infection of drive line.  Blood Cultures obtained on this admission are positive for MSSA as well.    Continue Cefazolin 6 grams IV q 24 hours by continuous infusion.  Will need PICC if he requires home IV Abx.     Seizure-like activity  On on Kera    LVAD (left ventricular assist device) present  -HeartMate 3 Implanted on 6/29/2022 as DT with RV failure on milrinone at 0.25 mcg/kg/min.  -Continue Coumadin, Goal INR 2.0-3.0. Sub Therapeutic today. INR 1.8. Cont Heparin.   -LDH is stable.  Will continue to monitor daily.  -Speed set at 5100, LSL 4700 rpm.    Procedure: Device Interrogation Including analysis of device parameters  Current Settings: Ventricular Assist Device  Review of device function is stable/unstable stable    TXP LVAD INTERROGATIONS 7/27/2023 7/27/2023 7/26/2023 7/26/2023 7/26/2023 7/26/2023 7/26/2023   Type HeartMate3 HeartMate3 HeartMate3 HeartMate3 HeartMate3 HeartMate3 HeartMate3   Flow 4.2 4.1 4.2 4.3 4.3 4.0 4.1   Speed 5100 5150 5100 5100 5100 5100 5100   PI 4.4 4.3 5.2 3.5 4.8 5.8 5.2   Power (Serna) 3.7 3.8 3.9 3.6 3.8 3.6 3.8   LSL 4700 4700 4700 4700 4700 4700 4700   Low Flow Alarm - - - - - - -   High Power Alarm - - - - - - -   Pulsatility No Pulse Pulse - Pulse Pulse Pulse -       Type 2 diabetes mellitus with hyperglycemia  Will keep him on sliding scale and a.c. HS    Chronic combined systolic and diastolic heart failure  Status post LVAD.  On GDMT with valsartan [place] [id number]  Heart Transplant  [facility] - Cardiology Stepdown

## 2023-07-29 NOTE — NURSING
Notified  GILDARDO Cain and MD. Wilkinson pt c/o 7/10 LUQ abdomen pain, pt kaitlin he said he has the pain all the time, it's little pain most of the time, but today getting worse, Norco given, WCTM. 1200 pt said no more pain after norco given..

## 2023-07-29 NOTE — PROGRESS NOTES
[facility]  Infectious Disease  Progress Note    Patient Name: Kevan Queen  MRN: [id number]  Admission Date: 7/22/2023  Length of Stay: 7 days  Attending Physician: Natalya Villatoro MD  Primary Care Provider: ORALIA Cline    Isolation Status: Airborne and Contact and Droplet  Assessment/Plan:      ID  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)  I independently reviewed patient's lab work and images as documented. 39 yo male with LVAD c/b prior MSSA DLESI s/p treatment admitted with MSSA bacteremia 2/2 to DLES. Admit blcx and repeat DLES cx with MSSA. Also found to have COVID, CXR with L >R opacity; s/p remdesivir, on dexamethasone, suspect leukocytosis 2/2 to steroids. Denies new joint/back pain or diarrhea.    Recommendations:  -continue ancef 8g CI IV daily   -monitor LFTS/cr while on therapy  -favor pulling RIJ (present while bacteremic) and repeat 2 sets of blood cx after line pulled (ordered)  -TTE for completeness   -f/u cx data  -continue dexamethasone and isolation precautions per hospital protocol            Thank you for your consult. I will follow-up with patient. Please contact us if you have any additional questions. Above d/w primary team.     Laura Baldwin MD  Infectious Disease  [facility]    Subjective:     Principal Problem:Sepsis    HPI: 38 year old male with a history of CHF s/p LVAD placement in June of 2022.  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  He was apparently on room air for most of that hospital stay.  He received 3 days of remdesivir.  He was discharged on oral doxycycline to complete a 14 day course for the MSSA drive line infection.  He had tested negative for COVID-19 following his treatment.  He is re-admitted with complaints of shortness of breath, chest pains and generalized ill feeling. COVID-19 testing  is positive again.  Chest x-ray shows diffuse infiltrates bilaterally.  ID is consulted to assist with his management.      Interval History: No fevers documented overnight. Tolerating abx without issues, denies new joint pain.       Review of Systems  Objective:     Vital Signs (Most Recent):  Temp: 98.7 °F (37.1 °C) (07/29/23 0415)  Pulse: 110 (07/28/23 2329)  Resp: 18 (07/29/23 0415)  BP: (!) 80/0 (07/29/23 0415)  SpO2: 99 % (07/29/23 0415) Vital Signs (24h Range):  Temp:  [98.2 °F (36.8 °C)-98.9 °F (37.2 °C)] 98.7 °F (37.1 °C)  Pulse:  [] 110  Resp:  [16-20] 18  SpO2:  [96 %-99 %] 99 %  BP: ()/(0-72) 80/0     Weight: 92.3 kg (203 lb 7.8 oz)  Body mass index is 25.43 kg/m².    Estimated Creatinine Clearance: 99.8 mL/min (based on SCr of 1.2 mg/dL).     Physical Exam  Constitutional:       General: He is not in acute distress.     Appearance: He is not ill-appearing or toxic-appearing.   HENT:      Head: Normocephalic and atraumatic.      Right Ear: External ear normal.      Left Ear: External ear normal.   Eyes:      General: No scleral icterus.        Right eye: No discharge.         Left eye: No discharge.   Cardiovascular:      Comments: VAD  Pulmonary:      Effort: Pulmonary effort is normal. No respiratory distress.   Abdominal:      General: There is no distension.      Palpations: Abdomen is soft.      Tenderness: There is no abdominal tenderness. There is no guarding.      Comments: DLES dressed - c/d/i   Musculoskeletal:         General: No swelling or tenderness.      Right lower leg: No edema.      Left lower leg: No edema.   Skin:     General: Skin is warm and dry.      Findings: No erythema.      Comments: RIJ   Neurological:      Mental Status: He is alert and oriented to person, place, and time.          Significant Labs:   Microbiology Results (last 7 days)       Procedure Component Value Units Date/Time    Blood culture [[id number]] Collected: 07/25/23 9708    Order Status: Completed  Specimen: Blood Updated: 07/29/23 0612     Blood Culture, Routine No Growth to date      No Growth to date      No Growth to date      No Growth to date      No Growth to date    Blood culture [[id number]] Collected: 07/28/23 0939    Order Status: Completed Specimen: Blood from Antecubital, Left Arm Updated: 07/28/23 1715     Blood Culture, Routine No Growth to date    Blood culture [[id number]] Collected: 07/28/23 0940    Order Status: Completed Specimen: Blood from Peripheral, Left Hand Updated: 07/28/23 1715     Blood Culture, Routine No Growth to date    Blood culture [[id number]]  (Abnormal) Collected: 07/25/23 0451    Order Status: Completed Specimen: Blood Updated: 07/28/23 0906     Blood Culture, Routine Gram stain aer bottle: Gram positive cocci in clusters resembling Staph      Results called to and read back by:Laura Kilgore RN 07/27/2023  13:01      STAPHYLOCOCCUS AUREUS  Susceptibility pending  ID consult required at [facility].      MRSA/SA Rapid ID by PCR from Blood culture [[id number]]  (Abnormal) Collected: 07/25/23 0451    Order Status: Completed Updated: 07/27/23 1438     Staph aureus ID by PCR Positive     Methicillin Resistant ID by PCR Negative    Blood culture [[id number]]  (Abnormal)  (Susceptibility) Collected: 07/23/23 1305    Order Status: Completed Specimen: Blood from Line, Jugular, Internal Right Updated: 07/26/23 1120     Blood Culture, Routine Gram stain aer bottle: Gram positive cocci in clusters resembling Staph      Results called to and read back by:Cong Urena RN 07/24/2023  16:05      STAPHYLOCOCCUS AUREUS  ID consult required at [facility].      Culture, Anaerobe [[id number]] Collected: 07/22/23 0159    Order Status: Completed Specimen: Abscess from Abdomen Updated: 07/25/23 1134     Anaerobic Culture No anaerobes isolated    Blood culture [[id number]]  (Abnormal)  (Susceptibility) Collected: 07/22/23 1058    Order Status:  Completed Specimen: Blood from Peripheral, Hand, Right Updated: 07/25/23 1014     Blood Culture, Routine Gram stain aer bottle: Gram positive cocci in clusters resembling Staph      Results called to and read back by:Zaheer Rae RN 07/23/2023  06:41      STAPHYLOCOCCUS AUREUS  ID consult required at [facility].      Aerobic culture [[id number]]  (Abnormal)  (Susceptibility) Collected: 07/22/23 0159    Order Status: Completed Specimen: Abscess from Abdomen Updated: 07/24/23 1113     Aerobic Bacterial Culture STAPHYLOCOCCUS AUREUS  Few      MRSA/SA Rapid ID by PCR from Blood culture [[id number]]  (Abnormal) Collected: 07/22/23 1058    Order Status: Completed Updated: 07/23/23 0557     Staph aureus ID by PCR Positive     Methicillin Resistant ID by PCR Negative            Significant Imaging: I have reviewed all pertinent imaging results/findings within the past 24 hours.

## 2023-07-29 NOTE — ASSESSMENT & PLAN NOTE
Secondary to COVID-19 infection.   S/P remdesivir X 5 days.    Plan    1. Continue dexamethasone for a total 10 days.

## 2023-07-29 NOTE — SUBJECTIVE & OBJECTIVE
Interval History: No fevers documented overnight. Tolerating abx without issues, denies new joint pain.       Review of Systems  Objective:     Vital Signs (Most Recent):  Temp: 98.7 °F (37.1 °C) (07/29/23 0415)  Pulse: 110 (07/28/23 2329)  Resp: 18 (07/29/23 0415)  BP: (!) 80/0 (07/29/23 0415)  SpO2: 99 % (07/29/23 0415) Vital Signs (24h Range):  Temp:  [98.2 °F (36.8 °C)-98.9 °F (37.2 °C)] 98.7 °F (37.1 °C)  Pulse:  [] 110  Resp:  [16-20] 18  SpO2:  [96 %-99 %] 99 %  BP: ()/(0-72) 80/0     Weight: 92.3 kg (203 lb 7.8 oz)  Body mass index is 25.43 kg/m².    Estimated Creatinine Clearance: 99.8 mL/min (based on SCr of 1.2 mg/dL).     Physical Exam  Constitutional:       General: He is not in acute distress.     Appearance: He is not ill-appearing or toxic-appearing.   HENT:      Head: Normocephalic and atraumatic.      Right Ear: External ear normal.      Left Ear: External ear normal.   Eyes:      General: No scleral icterus.        Right eye: No discharge.         Left eye: No discharge.   Cardiovascular:      Comments: VAD  Pulmonary:      Effort: Pulmonary effort is normal. No respiratory distress.   Abdominal:      General: There is no distension.      Palpations: Abdomen is soft.      Tenderness: There is no abdominal tenderness. There is no guarding.      Comments: DLES dressed - c/d/i   Musculoskeletal:         General: No swelling or tenderness.      Right lower leg: No edema.      Left lower leg: No edema.   Skin:     General: Skin is warm and dry.      Findings: No erythema.      Comments: RIJ   Neurological:      Mental Status: He is alert and oriented to person, place, and time.          Significant Labs:   Microbiology Results (last 7 days)       Procedure Component Value Units Date/Time    Blood culture [[id number]] Collected: 07/25/23 0450    Order Status: Completed Specimen: Blood Updated: 07/29/23 0612     Blood Culture, Routine No Growth to date      No Growth to date      No Growth to  date      No Growth to date      No Growth to date    Blood culture [[id number]] Collected: 07/28/23 0939    Order Status: Completed Specimen: Blood from Antecubital, Left Arm Updated: 07/28/23 1715     Blood Culture, Routine No Growth to date    Blood culture [[id number]] Collected: 07/28/23 0940    Order Status: Completed Specimen: Blood from Peripheral, Left Hand Updated: 07/28/23 1715     Blood Culture, Routine No Growth to date    Blood culture [[id number]]  (Abnormal) Collected: 07/25/23 0451    Order Status: Completed Specimen: Blood Updated: 07/28/23 0906     Blood Culture, Routine Gram stain aer bottle: Gram positive cocci in clusters resembling Staph      Results called to and read back by:Laura Kilgore RN 07/27/2023  13:01      STAPHYLOCOCCUS AUREUS  Susceptibility pending  ID consult required at [facility].      MRSA/SA Rapid ID by PCR from Blood culture [[id number]]  (Abnormal) Collected: 07/25/23 0451    Order Status: Completed Updated: 07/27/23 1438     Staph aureus ID by PCR Positive     Methicillin Resistant ID by PCR Negative    Blood culture [[id number]]  (Abnormal)  (Susceptibility) Collected: 07/23/23 1305    Order Status: Completed Specimen: Blood from Line, Jugular, Internal Right Updated: 07/26/23 1120     Blood Culture, Routine Gram stain aer bottle: Gram positive cocci in clusters resembling Staph      Results called to and read back by:Cong Urena RN 07/24/2023  16:05      STAPHYLOCOCCUS AUREUS  ID consult required at [facility].      Culture, Anaerobe [[id number]] Collected: 07/22/23 0159    Order Status: Completed Specimen: Abscess from Abdomen Updated: 07/25/23 1134     Anaerobic Culture No anaerobes isolated    Blood culture [[id number]]  (Abnormal)  (Susceptibility) Collected: 07/22/23 1058    Order Status: Completed Specimen: Blood from Peripheral, Hand, Right Updated: 07/25/23 1014     Blood Culture, Routine Gram stain aer  bottle: Gram positive cocci in clusters resembling Staph      Results called to and read back by:Zaheer Rae RN 07/23/2023  06:41      STAPHYLOCOCCUS AUREUS  ID consult required at [facility].      Aerobic culture [[id number]]  (Abnormal)  (Susceptibility) Collected: 07/22/23 0159    Order Status: Completed Specimen: Abscess from Abdomen Updated: 07/24/23 1113     Aerobic Bacterial Culture STAPHYLOCOCCUS AUREUS  Few      MRSA/SA Rapid ID by PCR from Blood culture [[id number]]  (Abnormal) Collected: 07/22/23 1058    Order Status: Completed Updated: 07/23/23 0557     Staph aureus ID by PCR Positive     Methicillin Resistant ID by PCR Negative            Significant Imaging: I have reviewed all pertinent imaging results/findings within the past 24 hours.

## 2023-07-29 NOTE — PLAN OF CARE
RT IJ removed during the day. Blood culture  collected after IJ removal. BG monitored. VSS. LVAD number and doppler WNL. LVAD dressing changed per protocol ( see note). Continue on milrinone gtt  and ancef gtt.  Pt educated on fall risk and remained free from falls/trauma/injury. Denies chest pain, SOB, palpitations, dizziness, pain, or discomfort. Plan of care reviewed with pt, all questions answered. Bed locked in lowest position, call bell within reach, no acute distress noted, will continue to monitor.

## 2023-07-29 NOTE — ASSESSMENT & PLAN NOTE
I independently reviewed patient's lab work and images as documented. 39 yo male with LVAD c/b prior MSSA DLESI s/p treatment admitted with MSSA bacteremia 2/2 to DLES. Admit blcx and repeat DLES cx with MSSA. Also found to have COVID, CXR with L >R opacity; s/p remdesivir, on dexamethasone, suspect leukocytosis 2/2 to steroids. Denies new joint/back pain or diarrhea.    Recommendations:  -continue ancef 8g CI IV daily  -favor pulling RIJ (present while bacteremic) and repeat 2 sets of blood cx after line pulled (ordered)  -TTE for completeness   -f/u cx data  -continue dexamethasone and isolation precautions per hospital protocol

## 2023-07-29 NOTE — PROGRESS NOTES
[facility]  Infectious Disease  Progress Note    Patient Name: Kevan Queen  MRN: [id number]  Admission Date: 7/22/2023  Length of Stay: 6 days  Attending Physician: Natalya Villatoro MD  Primary Care Provider: ORALIA Cline    Isolation Status: Airborne and Contact and Droplet  Assessment/Plan:      Pulmonary  Acute respiratory failure with hypoxia  Secondary to COVID-19 infection.   S/P remdesivir X 5 days.    Plan    1. Continue dexamethasone for a total 10 days.    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  Cultures obtained on this admission are positive for MSSA as well.      Plan      1. IV cefazolin.    2. Anticipate 6 weeks of therapy.    Staphylococcus aureus bacteremia  38 year old male with a history of MSSA infection of his drive line exit site.  He is admitted with sepsis.  Blood cultures from July 22 and July 25 are positive.  Cefazolin dose increased to 8 gram over 24 hours.  Plan    1. Continue IV cefazolin.    2. Blood cultures repeated today.    3. Anticipate 6 weeks of IV therapy followed by oral suppression.        Anticipated Disposition: TBD    Thank you for your consult. I will follow-up with patient. Please contact us if you have any additional questions.    Benny Paul MD  Infectious Disease  [facility] Stepdown    Subjective:     Principal Problem:Sepsis    HPI: 38 year old male with a history of CHF s/p LVAD placement in June of 2022.  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  He was apparently on room air for most of that hospital stay.  He received 3 days of remdesivir.  He was discharged on oral doxycycline to complete a 14 day course for the MSSA drive line infection.  He had tested negative for COVID-19 following his treatment.  He is re-admitted with complaints of shortness  of breath, chest pains and generalized ill feeling. COVID-19 testing is positive again.  Chest x-ray shows diffuse infiltrates bilaterally.  ID is consulted to assist with his management.      Interval History: No adverse events.    Review of Systems   All other systems reviewed and are negative.  Objective:     Vital Signs (Most Recent):  Temp: 98.3 °F (36.8 °C) (07/28/23 1950)  Pulse: 83 (07/28/23 1529)  Resp: 18 (07/28/23 1950)  BP: (!) 78/0 (07/28/23 1950)  SpO2: 98 % (07/28/23 1950) Vital Signs (24h Range):  Temp:  [98.1 °F (36.7 °C)-98.9 °F (37.2 °C)] 98.3 °F (36.8 °C)  Pulse:  [] 83  Resp:  [16-20] 18  SpO2:  [96 %-99 %] 98 %  BP: ()/(0-72) 78/0     Weight: 92.9 kg (204 lb 12.9 oz)  Body mass index is 25.6 kg/m².    Estimated Creatinine Clearance: 99.8 mL/min (based on SCr of 1.2 mg/dL).     Physical Exam  Vitals and nursing note reviewed.   Constitutional:       General: He is not in acute distress.     Appearance: He is well-developed. He is not diaphoretic.   HENT:      Head: Normocephalic and atraumatic.      Right Ear: External ear normal.      Left Ear: External ear normal.      Nose: Nose normal.      Mouth/Throat:      Pharynx: No oropharyngeal exudate.   Eyes:      General: No scleral icterus.        Right eye: No discharge.         Left eye: No discharge.      Conjunctiva/sclera: Conjunctivae normal.      Pupils: Pupils are equal, round, and reactive to light.   Neck:      Thyroid: No thyromegaly.      Vascular: No JVD.      Trachea: No tracheal deviation.   Cardiovascular:      Comments: hum  Pulmonary:      Effort: Pulmonary effort is normal. No respiratory distress.      Breath sounds: Normal breath sounds. No stridor. No wheezing or rales.   Chest:      Chest wall: No tenderness.   Abdominal:      General: Bowel sounds are normal. There is no distension.      Palpations: Abdomen is soft. There is no mass.      Tenderness: There is no abdominal tenderness. There is no guarding or  rebound.      Comments: Drive line in place.   Musculoskeletal:         General: No tenderness. Normal range of motion.      Cervical back: Normal range of motion and neck supple.   Lymphadenopathy:      Cervical: No cervical adenopathy.   Skin:     General: Skin is warm.      Coloration: Skin is not pale.      Findings: No erythema or rash.   Neurological:      Mental Status: He is alert and oriented to person, place, and time.      Cranial Nerves: No cranial nerve deficit.      Motor: No abnormal muscle tone.      Coordination: Coordination normal.      Deep Tendon Reflexes: Reflexes are normal and symmetric. Reflexes normal.   Psychiatric:         Behavior: Behavior normal.         Thought Content: Thought content normal.         Judgment: Judgment normal.        Significant Labs:   Microbiology Results (last 7 days)       Procedure Component Value Units Date/Time    Blood culture [[id number]] Collected: 07/28/23 0939    Order Status: Completed Specimen: Blood from Antecubital, Left Arm Updated: 07/28/23 1715     Blood Culture, Routine No Growth to date    Blood culture [[id number]] Collected: 07/28/23 0940    Order Status: Completed Specimen: Blood from Peripheral, Left Hand Updated: 07/28/23 1715     Blood Culture, Routine No Growth to date    Blood culture [[id number]]  (Abnormal) Collected: 07/25/23 0451    Order Status: Completed Specimen: Blood Updated: 07/28/23 0906     Blood Culture, Routine Gram stain aer bottle: Gram positive cocci in clusters resembling Staph      Results called to and read back by:Laura Kilgore RN 07/27/2023  13:01      STAPHYLOCOCCUS AUREUS  Susceptibility pending  ID consult required at [facility] and [facility] locations.      Blood culture [[id number]] Collected: 07/25/23 0450    Order Status: Completed Specimen: Blood Updated: 07/28/23 0612     Blood Culture, Routine No Growth to date      No Growth to date      No Growth to date      No Growth to date    MRSA/SA Rapid ID by PCR  from Blood culture [[id number]]  (Abnormal) Collected: 07/25/23 0451    Order Status: Completed Updated: 07/27/23 1438     Staph aureus ID by PCR Positive     Methicillin Resistant ID by PCR Negative    Blood culture [[id number]]  (Abnormal)  (Susceptibility) Collected: 07/23/23 1305    Order Status: Completed Specimen: Blood from Line, Jugular, Internal Right Updated: 07/26/23 1120     Blood Culture, Routine Gram stain aer bottle: Gram positive cocci in clusters resembling Staph      Results called to and read back by:Cong Urena RN 07/24/2023  16:05      STAPHYLOCOCCUS AUREUS  ID consult required at [facility].      Culture, Anaerobe [[id number]] Collected: 07/22/23 0159    Order Status: Completed Specimen: Abscess from Abdomen Updated: 07/25/23 1134     Anaerobic Culture No anaerobes isolated    Blood culture [[id number]]  (Abnormal)  (Susceptibility) Collected: 07/22/23 1058    Order Status: Completed Specimen: Blood from Peripheral, Hand, Right Updated: 07/25/23 1014     Blood Culture, Routine Gram stain aer bottle: Gram positive cocci in clusters resembling Staph      Results called to and read back by:Zaheer Rae RN 07/23/2023  06:41      STAPHYLOCOCCUS AUREUS  ID consult required at [facility] and [facility].      Aerobic culture [[id number]]  (Abnormal)  (Susceptibility) Collected: 07/22/23 0159    Order Status: Completed Specimen: Abscess from Abdomen Updated: 07/24/23 1113     Aerobic Bacterial Culture STAPHYLOCOCCUS AUREUS  Few      MRSA/SA Rapid ID by PCR from Blood culture [[id number]]  (Abnormal) Collected: 07/22/23 1058    Order Status: Completed Updated: 07/23/23 0557     Staph aureus ID by PCR Positive     Methicillin Resistant ID by PCR Negative    Blood culture [[id number]]     Order Status: Canceled Specimen: Blood             Significant Imaging: I have reviewed all pertinent imaging results/findings within the past 24 hours.

## 2023-07-29 NOTE — CARE UPDATE
Care Update:     No acute events overnight. Patient on the CSU in room 314/314 A. Blood glucose stable. BG above goal on current insulin regimen (SSI, prandial, and basal insulin ). Steroid use- Dexamethasone  6 mg (dose 8/10).    Renal function- Normal   Vasopressors-  None       Diet Adult Regular (IDDSI Level 7) [facility]; Double Portions; Fluid - 1500mL     POCT Glucose   Date Value Ref Range Status   07/28/2023 282 (H) 70 - 110 mg/dL Final   07/28/2023 149 (H) 70 - 110 mg/dL Final   07/28/2023 262 (H) 70 - 110 mg/dL Final   07/28/2023 219 (H) 70 - 110 mg/dL Final   07/27/2023 276 (H) 70 - 110 mg/dL Final   07/27/2023 246 (H) 70 - 110 mg/dL Final   07/27/2023 268 (H) 70 - 110 mg/dL Final   07/27/2023 214 (H) 70 - 110 mg/dL Final   07/27/2023 281 (H) 70 - 110 mg/dL Final   07/27/2023 257 (H) 70 - 110 mg/dL Final   07/26/2023 269 (H) 70 - 110 mg/dL Final   07/26/2023 176 (H) 70 - 110 mg/dL Final   07/26/2023 259 (H) 70 - 110 mg/dL Final   07/26/2023 273 (H) 70 - 110 mg/dL Final     Lab Results   Component Value Date    HGBA1C 6.7 (H) 07/22/2023       Diabetes Medications               insulin aspart U-100 (NOVOLOG) 100 unit/mL (3 mL) InPn pen Inject 16 Units into the skin 3 (three) times daily with meals. Plus Sliding Scale: 151-200: +1, 201-250: +2, 251-300: +3, 301-350: +4 and call MD; Max Daily Dose: 60 units    insulin detemir U-100, Levemir, 100 unit/mL (3 mL) SubQ InPn pen Inject 22 Units into the skin 2 (two) times daily.        Endocrine  Type 2 diabetes mellitus with hyperglycemia  BG goal 140-180     - Levemir to 24 units BID (10% increase due to fasting blood glucose above goal)  - Novolog to 24 units TID with meals (10% increase due to prandial blood glucose  above goal)   - Continue Moderate Dose Correction Scale      - BG monitoring ac/hs       ** Please notify Endocrine for any change and/or advance in diet**  ** Please call Endocrine for any BG related issues **    Discharge Planning:   TBD.  Please notify endocrinology prior to discharge.      Michael Wyman DNP, FNP-C  Department of Endocrinology  Inpatient Glycemic Management

## 2023-07-29 NOTE — NURSING
Pt's VAD dressing changed per protocol using kit and sterile technique. Pt's drive line site is clean, dry, intact with small yellow purulent  drainage at inner layer of old dressing; DLES is + (2). No kinks or frays on drive line, secured with melendez anchor. Pt tolerated dressing change well. Next dressing change due 07/30/2023.

## 2023-07-29 NOTE — PROGRESS NOTES
07/29/2023  Mirela Chris    Current provider:  Natalya Villatoro MD    Device interrogation:  TXP LVAD INTERROGATIONS 7/29/2023 7/29/2023 7/29/2023 7/28/2023 7/28/2023 7/28/2023 7/28/2023   Type HeartMate3 HeartMate3 HeartMate3 HeartMate3 HeartMate3 HeartMate3 HeartMate3   Flow 4.2 4.2 4.1 4.1 4.2 4.3 4.1   Speed 5100 5100 5100 5100 5100 5100 5100   PI 4.7 5.2 5.0 5.8 4.1 6.0 6.1   Power (Serna) 3.7 3.6 3.7 3.8 3.7 3.7 3.8   LSL 4700 4700 4700 4700 4700 4700 4700   Low Flow Alarm - - - - - - -   High Power Alarm - - - - - - -   Pulsatility Pulse Pulse Pulse Pulse Pulse Pulse Pulse          Rounded on Kevan Queen to ensure all mechanical assist device settings (IABP or VAD) were appropriate and all parameters were within limits.  I was able to ensure all back up equipment was present, the staff had no issues, and the Perfusion Department daily rounding was complete.      For implantable VADs: Interrogation of Ventricular assist device was performed with analysis of device parameters and review of device function. I have personally reviewed the interrogation findings and agree with findings as stated.     In emergency, the nursing units have been notified to contact the perfusion department either by:  Calling [phone] from 630am to 4pm Mon thru Fri, utilizing the On-Call Finder functionality of Epic and searching for Perfusion, or by contacting the hospital  from 4pm to 630am and on weekends and asking to speak with the perfusionist on call.    4:12 PM

## 2023-07-30 PROBLEM — B95.8 BACTEREMIA DUE TO STAPHYLOCOCCUS: Status: ACTIVE | Noted: 2023-07-23

## 2023-07-30 PROBLEM — J96.01 ACUTE RESPIRATORY FAILURE WITH HYPOXIA: Status: RESOLVED | Noted: 2023-07-22 | Resolved: 2023-07-30

## 2023-07-30 LAB
ALBUMIN SERPL BCP-MCNC: 2.2 G/DL (ref 3.5–5.2)
ALP SERPL-CCNC: 135 U/L (ref 55–135)
ALT SERPL W/O P-5'-P-CCNC: 32 U/L (ref 10–44)
ANION GAP SERPL CALC-SCNC: 9 MMOL/L (ref 8–16)
ANISOCYTOSIS BLD QL SMEAR: SLIGHT
APTT PPP: 33.8 SEC (ref 21–32)
AST SERPL-CCNC: 47 U/L (ref 10–40)
BACTERIA BLD CULT: NORMAL
BASOPHILS # BLD AUTO: 0.08 K/UL (ref 0–0.2)
BASOPHILS NFR BLD: 0.2 % (ref 0–1.9)
BILIRUB SERPL-MCNC: 0.6 MG/DL (ref 0.1–1)
BUN SERPL-MCNC: 27 MG/DL (ref 6–20)
BURR CELLS BLD QL SMEAR: ABNORMAL
CALCIUM SERPL-MCNC: 9.3 MG/DL (ref 8.7–10.5)
CHLORIDE SERPL-SCNC: 101 MMOL/L (ref 95–110)
CO2 SERPL-SCNC: 22 MMOL/L (ref 23–29)
CREAT SERPL-MCNC: 1.2 MG/DL (ref 0.5–1.4)
DIFFERENTIAL METHOD: ABNORMAL
EOSINOPHIL # BLD AUTO: 0 K/UL (ref 0–0.5)
EOSINOPHIL NFR BLD: 0.1 % (ref 0–8)
ERYTHROCYTE [DISTWIDTH] IN BLOOD BY AUTOMATED COUNT: 23.9 % (ref 11.5–14.5)
EST. GFR  (NO RACE VARIABLE): >60 ML/MIN/1.73 M^2
GLUCOSE SERPL-MCNC: 298 MG/DL (ref 70–110)
HCT VFR BLD AUTO: 29.9 % (ref 40–54)
HGB BLD-MCNC: 10 G/DL (ref 14–18)
HYPOCHROMIA BLD QL SMEAR: ABNORMAL
IMM GRANULOCYTES # BLD AUTO: 1.57 K/UL (ref 0–0.04)
IMM GRANULOCYTES NFR BLD AUTO: 4.8 % (ref 0–0.5)
INR PPP: 2.6 (ref 0.8–1.2)
LDH SERPL L TO P-CCNC: 407 U/L (ref 110–260)
LYMPHOCYTES # BLD AUTO: 2.7 K/UL (ref 1–4.8)
LYMPHOCYTES NFR BLD: 8.3 % (ref 18–48)
MAGNESIUM SERPL-MCNC: 2 MG/DL (ref 1.6–2.6)
MCH RBC QN AUTO: 27.2 PG (ref 27–31)
MCHC RBC AUTO-ENTMCNC: 33.4 G/DL (ref 32–36)
MCV RBC AUTO: 82 FL (ref 82–98)
MONOCYTES # BLD AUTO: 1.9 K/UL (ref 0.3–1)
MONOCYTES NFR BLD: 5.9 % (ref 4–15)
NEUTROPHILS # BLD AUTO: 26.2 K/UL (ref 1.8–7.7)
NEUTROPHILS NFR BLD: 80.7 % (ref 38–73)
NRBC BLD-RTO: 0 /100 WBC
OVALOCYTES BLD QL SMEAR: ABNORMAL
PHOSPHATE SERPL-MCNC: 3.8 MG/DL (ref 2.7–4.5)
PLATELET # BLD AUTO: 527 K/UL (ref 150–450)
PLATELET BLD QL SMEAR: ABNORMAL
PMV BLD AUTO: 9.3 FL (ref 9.2–12.9)
POCT GLUCOSE: 171 MG/DL (ref 70–110)
POCT GLUCOSE: 193 MG/DL (ref 70–110)
POCT GLUCOSE: 216 MG/DL (ref 70–110)
POCT GLUCOSE: 261 MG/DL (ref 70–110)
POCT GLUCOSE: 285 MG/DL (ref 70–110)
POCT GLUCOSE: 313 MG/DL (ref 70–110)
POIKILOCYTOSIS BLD QL SMEAR: SLIGHT
POLYCHROMASIA BLD QL SMEAR: ABNORMAL
POTASSIUM SERPL-SCNC: 4.8 MMOL/L (ref 3.5–5.1)
PROT SERPL-MCNC: 7.2 G/DL (ref 6–8.4)
PROTHROMBIN TIME: 26.8 SEC (ref 9–12.5)
RBC # BLD AUTO: 3.67 M/UL (ref 4.6–6.2)
SODIUM SERPL-SCNC: 132 MMOL/L (ref 136–145)
TARGETS BLD QL SMEAR: ABNORMAL
WBC # BLD AUTO: 32.5 K/UL (ref 3.9–12.7)

## 2023-07-30 PROCEDURE — 93750 PR INTERROGATE VENT ASSIST DEV, IN PERSON, W PHYSICIAN ANALYSIS: ICD-10-PCS | Mod: ,,, | Performed by: INTERNAL MEDICINE

## 2023-07-30 PROCEDURE — 25000003 PHARM REV CODE 250: Performed by: INTERNAL MEDICINE

## 2023-07-30 PROCEDURE — 85730 THROMBOPLASTIN TIME PARTIAL: CPT | Performed by: STUDENT IN AN ORGANIZED HEALTH CARE EDUCATION/TRAINING PROGRAM

## 2023-07-30 PROCEDURE — 25000003 PHARM REV CODE 250: Performed by: NURSE PRACTITIONER

## 2023-07-30 PROCEDURE — 27000248 HC VAD-ADDITIONAL DAY

## 2023-07-30 PROCEDURE — 20600001 HC STEP DOWN PRIVATE ROOM

## 2023-07-30 PROCEDURE — 63600175 PHARM REV CODE 636 W HCPCS: Performed by: NURSE PRACTITIONER

## 2023-07-30 PROCEDURE — 36415 COLL VENOUS BLD VENIPUNCTURE: CPT | Performed by: HOSPITALIST

## 2023-07-30 PROCEDURE — 27000207 HC ISOLATION

## 2023-07-30 PROCEDURE — 80053 COMPREHEN METABOLIC PANEL: CPT | Performed by: NURSE PRACTITIONER

## 2023-07-30 PROCEDURE — 63600175 PHARM REV CODE 636 W HCPCS: Performed by: HOSPITALIST

## 2023-07-30 PROCEDURE — 99233 PR SUBSEQUENT HOSPITAL CARE,LEVL III: ICD-10-PCS | Mod: 95,,, | Performed by: NURSE PRACTITIONER

## 2023-07-30 PROCEDURE — 85610 PROTHROMBIN TIME: CPT | Performed by: HOSPITALIST

## 2023-07-30 PROCEDURE — 36415 COLL VENOUS BLD VENIPUNCTURE: CPT | Performed by: NURSE PRACTITIONER

## 2023-07-30 PROCEDURE — 93750 INTERROGATION VAD IN PERSON: CPT | Mod: ,,, | Performed by: INTERNAL MEDICINE

## 2023-07-30 PROCEDURE — 99233 SBSQ HOSP IP/OBS HIGH 50: CPT | Mod: 95,,, | Performed by: NURSE PRACTITIONER

## 2023-07-30 PROCEDURE — 25000003 PHARM REV CODE 250: Performed by: HOSPITALIST

## 2023-07-30 PROCEDURE — 87040 BLOOD CULTURE FOR BACTERIA: CPT | Mod: 59 | Performed by: NURSE PRACTITIONER

## 2023-07-30 PROCEDURE — 63600175 PHARM REV CODE 636 W HCPCS: Performed by: INTERNAL MEDICINE

## 2023-07-30 PROCEDURE — 83735 ASSAY OF MAGNESIUM: CPT | Performed by: HOSPITALIST

## 2023-07-30 PROCEDURE — 85025 COMPLETE CBC W/AUTO DIFF WBC: CPT | Performed by: NURSE PRACTITIONER

## 2023-07-30 PROCEDURE — 83615 LACTATE (LD) (LDH) ENZYME: CPT | Performed by: HOSPITALIST

## 2023-07-30 PROCEDURE — 84100 ASSAY OF PHOSPHORUS: CPT | Performed by: HOSPITALIST

## 2023-07-30 RX ORDER — INSULIN ASPART 100 [IU]/ML
0-15 INJECTION, SOLUTION INTRAVENOUS; SUBCUTANEOUS
Status: DISCONTINUED | OUTPATIENT
Start: 2023-07-30 | End: 2023-08-09 | Stop reason: HOSPADM

## 2023-07-30 RX ORDER — VALSARTAN 40 MG/1
40 TABLET ORAL 2 TIMES DAILY
Status: DISCONTINUED | OUTPATIENT
Start: 2023-07-30 | End: 2023-08-09 | Stop reason: HOSPADM

## 2023-07-30 RX ORDER — INSULIN ASPART 100 [IU]/ML
0-15 INJECTION, SOLUTION INTRAVENOUS; SUBCUTANEOUS
Status: DISCONTINUED | OUTPATIENT
Start: 2023-07-30 | End: 2023-07-30

## 2023-07-30 RX ADMIN — INSULIN ASPART 8 UNITS: 100 INJECTION, SOLUTION INTRAVENOUS; SUBCUTANEOUS at 01:07

## 2023-07-30 RX ADMIN — INSULIN DETEMIR 30 UNITS: 100 INJECTION, SOLUTION SUBCUTANEOUS at 08:07

## 2023-07-30 RX ADMIN — HYDROCODONE BITARTRATE AND ACETAMINOPHEN 1 TABLET: 5; 325 TABLET ORAL at 08:07

## 2023-07-30 RX ADMIN — ASPIRIN 81 MG: 81 TABLET, COATED ORAL at 08:07

## 2023-07-30 RX ADMIN — CEFAZOLIN 8 G: 2 INJECTION, POWDER, FOR SOLUTION INTRAMUSCULAR; INTRAVENOUS at 10:07

## 2023-07-30 RX ADMIN — MIRTAZAPINE 15 MG: 15 TABLET, FILM COATED ORAL at 08:07

## 2023-07-30 RX ADMIN — INSULIN ASPART 6 UNITS: 100 INJECTION, SOLUTION INTRAVENOUS; SUBCUTANEOUS at 12:07

## 2023-07-30 RX ADMIN — INSULIN ASPART 30 UNITS: 100 INJECTION, SOLUTION INTRAVENOUS; SUBCUTANEOUS at 08:07

## 2023-07-30 RX ADMIN — WARFARIN SODIUM 4.5 MG: 2.5 TABLET ORAL at 04:07

## 2023-07-30 RX ADMIN — INSULIN ASPART 9 UNITS: 100 INJECTION, SOLUTION INTRAVENOUS; SUBCUTANEOUS at 08:07

## 2023-07-30 RX ADMIN — DEXAMETHASONE 6 MG: 4 TABLET ORAL at 08:07

## 2023-07-30 RX ADMIN — SENNOSIDES AND DOCUSATE SODIUM 2 TABLET: 50; 8.6 TABLET ORAL at 08:07

## 2023-07-30 RX ADMIN — LEVETIRACETAM 1000 MG: 500 TABLET, FILM COATED ORAL at 08:07

## 2023-07-30 RX ADMIN — INSULIN ASPART 30 UNITS: 100 INJECTION, SOLUTION INTRAVENOUS; SUBCUTANEOUS at 04:07

## 2023-07-30 RX ADMIN — VALSARTAN 40 MG: 40 TABLET, FILM COATED ORAL at 10:07

## 2023-07-30 RX ADMIN — INSULIN ASPART 3 UNITS: 100 INJECTION, SOLUTION INTRAVENOUS; SUBCUTANEOUS at 04:07

## 2023-07-30 RX ADMIN — MILRINONE LACTATE 0.25 MCG/KG/MIN: 0.2 INJECTION, SOLUTION INTRAVENOUS at 01:07

## 2023-07-30 RX ADMIN — MILRINONE LACTATE 0.25 MCG/KG/MIN: 0.2 INJECTION, SOLUTION INTRAVENOUS at 04:07

## 2023-07-30 RX ADMIN — VALSARTAN 40 MG: 40 TABLET, FILM COATED ORAL at 08:07

## 2023-07-30 RX ADMIN — FUROSEMIDE 80 MG: 80 TABLET ORAL at 08:07

## 2023-07-30 RX ADMIN — INSULIN ASPART 30 UNITS: 100 INJECTION, SOLUTION INTRAVENOUS; SUBCUTANEOUS at 12:07

## 2023-07-30 RX ADMIN — BUSPIRONE HYDROCHLORIDE 10 MG: 10 TABLET ORAL at 08:07

## 2023-07-30 NOTE — PLAN OF CARE
BG monitored. VSS. LVAD number and doppler WNL. LVAD dressing changed per protocol ( see note). Continue on milrinone gtt  and ancef gtt.  Pt educated on fall risk and remained free from falls/trauma/injury. Denies chest pain, SOB, palpitations, dizziness, pain, or discomfort. Plan of care reviewed with pt, all questions answered. Bed locked in lowest position, call bell within reach, no acute distress noted, will continue to monitor.

## 2023-07-30 NOTE — ASSESSMENT & PLAN NOTE
Status post LVAD.  GDMT: On Valsartan, Aldactone and Toprol at home. Will resume Valsartan 40 mg po bid today. K+ has trended up at 4.8 - D/C'd KCL 40 mEq bid. Monitor K+ now that ARB resumed

## 2023-07-30 NOTE — NURSING
Pt's VAD dressing changed per protocol using kit and sterile technique. Pt's drive line site is clean, dry, intact with scant yellow purulent  drainage at inner layer of old dressing; DLES is + (2). No kinks or frays on drive line, secured with melendez anchor. Pt tolerated dressing change well. Next dressing change due 07/31/2023.

## 2023-07-30 NOTE — SUBJECTIVE & OBJECTIVE
**Interval History: No complaints or overnight events. Blood cxs from 7/28 now +ve for GPC resembling Staph. Blood cxs from 7/29 with NGTD, repeated today. RIJ line was D/C'd yesterday, and getting TTE to R/O endocarditis    Continuous Infusions:   milrinone 20mg/100ml D5W (200mcg/ml) 0.25 mcg/kg/min (07/30/23 0157)     Scheduled Meds:   aspirin  81 mg Oral Daily    busPIRone  10 mg Oral BID    ceFAZolin (ANCEF) 8 g in dextrose 5 % (D5W) 500 mL CONTINUOUS INFUSION  8 g Intravenous Q24H    dexAMETHasone  6 mg Oral Daily    furosemide  80 mg Oral Daily    insulin aspart U-100  30 Units Subcutaneous TIDWM    insulin detemir U-100  30 Units Subcutaneous BID    levETIRAcetam  1,000 mg Oral BID    mirtazapine  15 mg Oral Nightly    mupirocin   Nasal BID    polyethylene glycol  17 g Oral Daily    senna-docusate 8.6-50 mg  2 tablet Oral BID    warfarin  4.5 mg Oral Daily     PRN Meds:acetaminophen, dextrose 10%, dextrose 10%, glucagon (human recombinant), glucose, glucose, HYDROcodone-acetaminophen, insulin aspart U-100    Review of patient's allergies indicates:   Allergen Reactions    Bumex [bumetanide] Hives    Torsemide Hives     Objective:     Vital Signs (Most Recent):  Temp: 97.8 °F (36.6 °C) (07/30/23 0750)  Pulse: 107 (07/30/23 0750)  Resp: 18 (07/30/23 0833)  BP: 100/66 (07/30/23 0752)  SpO2: 95 % (07/30/23 0750) Vital Signs (24h Range):  Temp:  [97.7 °F (36.5 °C)-98.5 °F (36.9 °C)] 97.8 °F (36.6 °C)  Pulse:  [] 107  Resp:  [18-19] 18  SpO2:  [74 %-99 %] 95 %  BP: ()/(0-68) 100/66     Patient Vitals for the past 72 hrs (Last 3 readings):   Weight   07/30/23 0750 90.8 kg (200 lb 2.8 oz)   07/29/23 0752 90.7 kg (199 lb 15.3 oz)   07/29/23 0432 92.3 kg (203 lb 7.8 oz)     Body mass index is 25.02 kg/m².      Intake/Output Summary (Last 24 hours) at 7/30/2023 0945  Last data filed at 7/30/2023 0830  Gross per 24 hour   Intake 942 ml   Output 3475 ml   Net -2533 ml       Hemodynamic Parameters:        Telemetry: ST       Physical Exam  Constitutional:       Appearance: Normal appearance.   HENT:      Head: Normocephalic and atraumatic.   Eyes:      Conjunctiva/sclera: Conjunctivae normal.      Pupils: Pupils are equal, round, and reactive to light.   Neck:      Comments: Do not appreciate elevated neck veins  Cardiovascular:      Rate and Rhythm: Regular rhythm. Tachycardia present.      Comments: Smooth VAD hum  Pulmonary:      Effort: Pulmonary effort is normal.      Breath sounds: Normal breath sounds.   Abdominal:      General: Bowel sounds are normal.      Palpations: Abdomen is soft.   Musculoskeletal:         General: No swelling. Normal range of motion.      Cervical back: Normal range of motion and neck supple.   Skin:     General: Skin is warm and dry.      Capillary Refill: Capillary refill takes 2 to 3 seconds.   Neurological:      General: No focal deficit present.      Mental Status: He is alert and oriented to person, place, and time.   Psychiatric:         Mood and Affect: Mood normal.         Behavior: Behavior normal.         Thought Content: Thought content normal.         Judgment: Judgment normal.            Significant Labs:  CBC:  Recent Labs   Lab 07/28/23  0431 07/29/23  0741 07/30/23  0545   WBC 42.35* 32.05* 32.50*   RBC 3.38* 3.58* 3.67*   HGB 9.2* 9.8* 10.0*   HCT 27.3* 29.1* 29.9*   * 532* 527*   MCV 81* 81* 82   MCH 27.2 27.4 27.2   MCHC 33.7 33.7 33.4     BNP:  Recent Labs   Lab 07/24/23  0322 07/26/23  0422 07/28/23  0431   * 273* 337*     CMP:  Recent Labs   Lab 07/26/23  0422 07/27/23  0401 07/28/23  0431 07/29/23  0443 07/30/23  0545   *   < > 248* 317* 298*   CALCIUM 9.0   < > 9.4 9.0 9.3   ALBUMIN 2.1*  --  2.2*  --  2.2*   PROT 7.2  --  6.7  --  7.2   *   < > 135* 133* 132*   K 4.1   < > 3.9 4.5 4.8   CO2 26   < > 28 26 22*   CL 93*   < > 97 99 101   BUN 31*   < > 29* 23* 27*   CREATININE 1.3   < > 1.2 1.2 1.2   ALKPHOS 143*  --  121  --  135    ALT 58*  --  30  --  32   AST 79*  --  32  --  47*   BILITOT 1.0  --  0.7  --  0.6    < > = values in this interval not displayed.      Coagulation:   Recent Labs   Lab 07/28/23 0431 07/29/23 0443 07/30/23 0545   INR 2.0* 2.0* 2.6*   APTT 77.1* 31.6 33.8*     LDH:  Recent Labs   Lab 07/28/23 0431 07/29/23 0443 07/30/23  0545   * 403* 407*     Microbiology:  Microbiology Results (last 7 days)       Procedure Component Value Units Date/Time    Blood culture [[id number]] Collected: 07/30/23 0810    Order Status: Sent Specimen: Blood Updated: 07/30/23 0833    Blood culture [[id number]] Collected: 07/30/23 0810    Order Status: Sent Specimen: Blood Updated: 07/30/23 0833    Blood culture [[id number]] Collected: 07/28/23 0939    Order Status: Completed Specimen: Blood from Antecubital, Left Arm Updated: 07/30/23 0640     Blood Culture, Routine Gram stain aer bottle: Gram positive cocci in clusters resembling Staph      Positive results previously called 07/29/2023  21:20    Blood culture [[id number]] Collected: 07/25/23 0450    Order Status: Completed Specimen: Blood Updated: 07/30/23 0612     Blood Culture, Routine No growth after 5 days.    Blood culture [[id number]] Collected: 07/29/23 1130    Order Status: Completed Specimen: Blood Updated: 07/29/23 1945     Blood Culture, Routine No Growth to date    Narrative:      Please obtain 2 sets after RIJ is pulled, thank you    Blood culture [[id number]] Collected: 07/29/23 1132    Order Status: Completed Specimen: Blood Updated: 07/29/23 1945     Blood Culture, Routine No Growth to date    Narrative:      Please obtain 2 sets after RIJ is pulled, thank you  Rt hand    Blood culture [[id number]] Collected: 07/28/23 0940    Order Status: Completed Specimen: Blood from Peripheral, Left Hand Updated: 07/29/23 1212     Blood Culture, Routine No Growth to date      No Growth to date    Blood culture [[id number]]  (Abnormal)  (Susceptibility) Collected: 07/25/23 0457     Order Status: Completed Specimen: Blood Updated: 07/29/23 1045     Blood Culture, Routine Gram stain aer bottle: Gram positive cocci in clusters resembling Staph      Results called to and read back by:Laura Kilgore RN 07/27/2023  13:01      STAPHYLOCOCCUS AUREUS  ID consult required at [facility].      MRSA/SA Rapid ID by PCR from Blood culture [[id number]]  (Abnormal) Collected: 07/25/23 0451    Order Status: Completed Updated: 07/27/23 1438     Staph aureus ID by PCR Positive     Methicillin Resistant ID by PCR Negative    Blood culture [[id number]]  (Abnormal)  (Susceptibility) Collected: 07/23/23 1305    Order Status: Completed Specimen: Blood from Line, Jugular, Internal Right Updated: 07/26/23 1120     Blood Culture, Routine Gram stain aer bottle: Gram positive cocci in clusters resembling Staph      Results called to and read back by:Cong Urena RN 07/24/2023  16:05      STAPHYLOCOCCUS AUREUS  ID consult required at [facility].      Culture, Anaerobe [[id number]] Collected: 07/22/23 0159    Order Status: Completed Specimen: Abscess from Abdomen Updated: 07/25/23 1134     Anaerobic Culture No anaerobes isolated    Blood culture [[id number]]  (Abnormal)  (Susceptibility) Collected: 07/22/23 1058    Order Status: Completed Specimen: Blood from Peripheral, Hand, Right Updated: 07/25/23 1014     Blood Culture, Routine Gram stain aer bottle: Gram positive cocci in clusters resembling Staph      Results called to and read back by:Zaheer Rae RN 07/23/2023  06:41      STAPHYLOCOCCUS AUREUS  ID consult required at [facility].      Aerobic culture [[id number]]  (Abnormal)  (Susceptibility) Collected: 07/22/23 0159    Order Status: Completed Specimen: Abscess from Abdomen Updated: 07/24/23 1113     Aerobic Bacterial Culture STAPHYLOCOCCUS AUREUS  Few              I have reviewed all pertinent labs within the past 24  hours.    Estimated Creatinine Clearance: 99.8 mL/min (based on SCr of 1.2 mg/dL).    Diagnostic Results:  I have reviewed and interpreted all pertinent imaging results/findings within the past 24 hours.

## 2023-07-30 NOTE — PROGRESS NOTES
07/30/2023  Mirela Chris    Current provider:  Natalya Villatoro MD    Device interrogation:  TXP LVAD INTERROGATIONS 7/30/2023 7/30/2023 7/30/2023 7/30/2023 7/29/2023 7/29/2023 7/29/2023   Type HeartMate3 HeartMate3 HeartMate3 HeartMate3 HeartMate3 HeartMate3 HeartMate3   Flow 4.9 4.5 3.9 4.1 4.2 4.1 4.5   Speed 5100 5100 5100 5100 5100 5100 5100   PI 3.8 4.7 6.4 5.8 4.7 5.0 4.4   Power (Serna) 3.7 3.7 3.8 3.7 3.6 3.7 3.7   LSL 4700 4700 4700 4700 4700 4700 4700   Low Flow Alarm - - - - - - -   High Power Alarm - - - - - - -   Pulsatility Pulse Pulse Pulse Pulse Pulse Pulse Pulse          Rounded on Kevan Queen to ensure all mechanical assist device settings (IABP or VAD) were appropriate and all parameters were within limits.  I was able to ensure all back up equipment was present, the staff had no issues, and the Perfusion Department daily rounding was complete.      For implantable VADs: Interrogation of Ventricular assist device was performed with analysis of device parameters and review of device function. I have personally reviewed the interrogation findings and agree with findings as stated.     In emergency, the nursing units have been notified to contact the perfusion department either by:  Calling [phone] from 630am to 4pm Mon thru Fri, utilizing the On-Call Finder functionality of Epic and searching for Perfusion, or by contacting the hospital  from 4pm to 630am and on weekends and asking to speak with the perfusionist on call.    6:36 PM

## 2023-07-30 NOTE — ASSESSMENT & PLAN NOTE
Patient presents with elevated fevers, white blood cell count, elevated lactic acid.    ID following. Possible Secondary to COVID-19 infection.    Continue:    1. Remdesivir for 5 days(day5).   2. 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 Repeated 7/29 with NGTD. Repeated again today  Continue Ancef per ID  RIJ TLC D/C'd on 7/29  Getting echo to R/O endocarditis

## 2023-07-30 NOTE — PROGRESS NOTES
[facility] - Cardiology Stepdown  Heart Transplant  Progress Note    Patient Name: Kevan Queen  MRN: [id number]  Admission Date: 7/22/2023  Hospital Length of Stay: 8 days  Attending Physician: Natalya Villatoro MD  Primary Care Provider: ORALIA Cline  Principal Problem:Sepsis    Subjective:     **Interval History: No complaints or overnight events. Blood cxs from 7/28 now +ve for GPC resembling Staph. Blood cxs from 7/29 with NGTD, repeated today. RIJ line was D/C'd yesterday, and getting TTE to R/O endocarditis    Continuous Infusions:   milrinone 20mg/100ml D5W (200mcg/ml) 0.25 mcg/kg/min (07/30/23 0157)     Scheduled Meds:   aspirin  81 mg Oral Daily    busPIRone  10 mg Oral BID    ceFAZolin (ANCEF) 8 g in dextrose 5 % (D5W) 500 mL CONTINUOUS INFUSION  8 g Intravenous Q24H    dexAMETHasone  6 mg Oral Daily    furosemide  80 mg Oral Daily    insulin aspart U-100  30 Units Subcutaneous TIDWM    insulin detemir U-100  30 Units Subcutaneous BID    levETIRAcetam  1,000 mg Oral BID    mirtazapine  15 mg Oral Nightly    mupirocin   Nasal BID    polyethylene glycol  17 g Oral Daily    senna-docusate 8.6-50 mg  2 tablet Oral BID    warfarin  4.5 mg Oral Daily     PRN Meds:acetaminophen, dextrose 10%, dextrose 10%, glucagon (human recombinant), glucose, glucose, HYDROcodone-acetaminophen, insulin aspart U-100    Review of patient's allergies indicates:   Allergen Reactions    Bumex [bumetanide] Hives    Torsemide Hives     Objective:     Vital Signs (Most Recent):  Temp: 97.8 °F (36.6 °C) (07/30/23 0750)  Pulse: 107 (07/30/23 0750)  Resp: 18 (07/30/23 0833)  BP: 100/66 (07/30/23 0752)  SpO2: 95 % (07/30/23 0750) Vital Signs (24h Range):  Temp:  [97.7 °F (36.5 °C)-98.5 °F (36.9 °C)] 97.8 °F (36.6 °C)  Pulse:  [] 107  Resp:  [18-19] 18  SpO2:  [74 %-99 %] 95 %  BP: ()/(0-68) 100/66     Patient Vitals for the past 72 hrs (Last 3 readings):   Weight   07/30/23 0750 90.8 kg (200 lb 2.8 oz)    07/29/23 0752 90.7 kg (199 lb 15.3 oz)   07/29/23 0432 92.3 kg (203 lb 7.8 oz)     Body mass index is 25.02 kg/m².      Intake/Output Summary (Last 24 hours) at 7/30/2023 0945  Last data filed at 7/30/2023 0830  Gross per 24 hour   Intake 942 ml   Output 3475 ml   Net -2533 ml       Hemodynamic Parameters:       Telemetry: ST       Physical Exam  Constitutional:       Appearance: Normal appearance.   HENT:      Head: Normocephalic and atraumatic.   Eyes:      Conjunctiva/sclera: Conjunctivae normal.      Pupils: Pupils are equal, round, and reactive to light.   Neck:      Comments: Do not appreciate elevated neck veins  Cardiovascular:      Rate and Rhythm: Regular rhythm. Tachycardia present.      Comments: Smooth VAD hum  Pulmonary:      Effort: Pulmonary effort is normal.      Breath sounds: Normal breath sounds.   Abdominal:      General: Bowel sounds are normal.      Palpations: Abdomen is soft.   Musculoskeletal:         General: No swelling. Normal range of motion.      Cervical back: Normal range of motion and neck supple.   Skin:     General: Skin is warm and dry.      Capillary Refill: Capillary refill takes 2 to 3 seconds.   Neurological:      General: No focal deficit present.      Mental Status: He is alert and oriented to person, place, and time.   Psychiatric:         Mood and Affect: Mood normal.         Behavior: Behavior normal.         Thought Content: Thought content normal.         Judgment: Judgment normal.            Significant Labs:  CBC:  Recent Labs   Lab 07/28/23  0431 07/29/23  0741 07/30/23  0545   WBC 42.35* 32.05* 32.50*   RBC 3.38* 3.58* 3.67*   HGB 9.2* 9.8* 10.0*   HCT 27.3* 29.1* 29.9*   * 532* 527*   MCV 81* 81* 82   MCH 27.2 27.4 27.2   MCHC 33.7 33.7 33.4     BNP:  Recent Labs   Lab 07/24/23  0322 07/26/23  0422 07/28/23  0431   * 273* 337*     CMP:  Recent Labs   Lab 07/26/23  0422 07/27/23  0401 07/28/23  0431 07/29/23  0443 07/30/23  0545   *   < >  248* 317* 298*   CALCIUM 9.0   < > 9.4 9.0 9.3   ALBUMIN 2.1*  --  2.2*  --  2.2*   PROT 7.2  --  6.7  --  7.2   *   < > 135* 133* 132*   K 4.1   < > 3.9 4.5 4.8   CO2 26   < > 28 26 22*   CL 93*   < > 97 99 101   BUN 31*   < > 29* 23* 27*   CREATININE 1.3   < > 1.2 1.2 1.2   ALKPHOS 143*  --  121  --  135   ALT 58*  --  30  --  32   AST 79*  --  32  --  47*   BILITOT 1.0  --  0.7  --  0.6    < > = values in this interval not displayed.      Coagulation:   Recent Labs   Lab 07/28/23  0431 07/29/23 0443 07/30/23  0545   INR 2.0* 2.0* 2.6*   APTT 77.1* 31.6 33.8*     LDH:  Recent Labs   Lab 07/28/23  0431 07/29/23 0443 07/30/23  0545   * 403* 407*     Microbiology:  Microbiology Results (last 7 days)       Procedure Component Value Units Date/Time    Blood culture [[id number]] Collected: 07/30/23 0810    Order Status: Sent Specimen: Blood Updated: 07/30/23 0833    Blood culture [[id number]] Collected: 07/30/23 0810    Order Status: Sent Specimen: Blood Updated: 07/30/23 0833    Blood culture [[id number]] Collected: 07/28/23 0939    Order Status: Completed Specimen: Blood from Antecubital, Left Arm Updated: 07/30/23 0640     Blood Culture, Routine Gram stain aer bottle: Gram positive cocci in clusters resembling Staph      Positive results previously called 07/29/2023  21:20    Blood culture [[id number]] Collected: 07/25/23 0450    Order Status: Completed Specimen: Blood Updated: 07/30/23 0612     Blood Culture, Routine No growth after 5 days.    Blood culture [[id number]] Collected: 07/29/23 1130    Order Status: Completed Specimen: Blood Updated: 07/29/23 1945     Blood Culture, Routine No Growth to date    Narrative:      Please obtain 2 sets after RIJ is pulled, thank you    Blood culture [[id number]] Collected: 07/29/23 1132    Order Status: Completed Specimen: Blood Updated: 07/29/23 1945     Blood Culture, Routine No Growth to date    Narrative:      Please obtain 2 sets after RIJ is pulled, thank  you  Rt hand    Blood culture [[id number]] Collected: 07/28/23 0940    Order Status: Completed Specimen: Blood from Peripheral, Left Hand Updated: 07/29/23 1212     Blood Culture, Routine No Growth to date      No Growth to date    Blood culture [[id number]]  (Abnormal)  (Susceptibility) Collected: 07/25/23 0451    Order Status: Completed Specimen: Blood Updated: 07/29/23 1045     Blood Culture, Routine Gram stain aer bottle: Gram positive cocci in clusters resembling Staph      Results called to and read back by:Laura Kilgore RN 07/27/2023  13:01      STAPHYLOCOCCUS AUREUS  ID consult [facility] at [facility]      MRSA/SA Rapid ID by PCR from Blood culture [[id number]]  (Abnormal) Collected: 07/25/23 0451    Order Status: Completed Updated: 07/27/23 1438     Staph aureus ID by PCR Positive     Methicillin Resistant ID by PCR Negative    Blood culture [[id number]]  (Abnormal)  (Susceptibility) Collected: 07/23/23 1305    Order Status: Completed Specimen: Blood from Line, Jugular, Internal Right Updated: 07/26/23 1120     Blood Culture, Routine Gram stain aer bottle: Gram positive cocci in clusters resembling Staph      Results called to and read back by:Cong Urena RN 07/24/2023  16:05      STAPHYLOCOCCUS AUREUS  ID consult required at [facility] [place] [facility].      Culture, Anaerobe [[id number]] Collected: 07/22/23 0159    Order Status: Completed Specimen: Abscess from Abdomen Updated: 07/25/23 1134     Anaerobic Culture No anaerobes isolated    Blood culture [[id number]]  (Abnormal)  (Susceptibility) Collected: 07/22/23 1058    Order Status: Completed Specimen: Blood from Peripheral, Hand, Right Updated: 07/25/23 1014     Blood Culture, Routine Gram stain aer bottle: Gram positive cocci in clusters resembling Staph      Results called to and read back by:Zaheer Rae RN 07/23/2023  06:41      STAPHYLOCOCCUS AUREUS  ID consult required at [facility] [place]  locations.      Aerobic culture [[id number]]  (Abnormal)  (Susceptibility) Collected: 07/22/23 0159    Order Status: Completed Specimen: Abscess from Abdomen Updated: 07/24/23 1113     Aerobic Bacterial Culture STAPHYLOCOCCUS AUREUS  Few              I have reviewed all pertinent labs within the past 24 hours.    Estimated Creatinine Clearance: 99.8 mL/min (based on SCr of 1.2 mg/dL).    Diagnostic Results:  I have reviewed and interpreted all pertinent imaging results/findings within the past 24 hours.    Assessment and Plan:     Patient is a 36 yo black male with stage d HFrEF, NICM, ? 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 He underwent RVAD removal and chest closure 6/30/2022.  He was weaned off  but he had to restarted due to RVF. He was eventually transitioned to milrinone (secondary to  shortage) now on 0.25 mcg/kg/min.  Patient also has history of driveline infection.  Patient presented to the emergency room today because of pleuritic chest pain that has been going on for the last 3 days however it was more intense today.  Also complains of having shortness of breath and fevers associated with it.  He was spiking fever of 102 in the emergency room with elevated white blood cell count up to 17,000. He was treated with Zosyn.  He was sent here for admission.  Patient was admitted a month back for COVID infection for which he received remdesivir for 3 days.  He is on doxycycline for chronic driveline infection.  He is on Milrinone for RV failure.  Patient denies having any low flow alarms on the pump.  Also denies having any lower extremity edema, increased discharge from his driveline site.  Patient has ground-glass opacities on imaging from outside hospital      * Sepsis  Patient presents with elevated fevers, white blood cell count,  elevated lactic acid.    ID following. Possible Secondary to COVID-19 infection.    Continue:    1. Remdesivir for 5 days(day5).   2. 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 Repeated 7/29 with NGTD. 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  -Continue Coumadin, Goal INR 2.0-3.0. Therapeutic today at 2.6  -LDH is stable.  Will continue to monitor daily.  -Speed set at 5100, LSL 4700 rpm  -Getting TTE to R/O endocarditis given persistent MSSA bacteremia    Procedure: Device Interrogation Including analysis of device parameters  Current Settings: Ventricular Assist Device  Review of device function is stable/unstable stable    TXP LVAD INTERROGATIONS 7/30/2023 7/30/2023 7/29/2023 7/29/2023 7/29/2023 7/29/2023 7/29/2023   Type HeartMate3 HeartMate3 HeartMate3 HeartMate3 HeartMate3 HeartMate3 HeartMate3   Flow 3.9 4.1 4.2 4.1 4.5 4.2 4.2   Speed 5100 5100 5100 5100 5100 5100 5100   PI 6.4 5.8 4.7 5.0 4.4 4.7 5.2   Power (Serna) 3.8 3.7 3.6 3.7 3.7 3.7 3.6   LSL 4700 4700 4700 4700 4700 4700 4700   Low Flow Alarm - - - - - - -   High Power Alarm - - - - - - -   Pulsatility Pulse Pulse Pulse Pulse Pulse Pulse Pulse       Type 2 diabetes mellitus with hyperglycemia  Will keep him on sliding scale and a.c. HS    Chronic combined systolic and diastolic heart failure  Status post LVAD.  GDMT: On Valsartan, Aldactone and Toprol at home. Will resume Valsartan 40 mg po bid today. K+ has trended  up at 4.8 - D/C'd KCL 40 mEq bid. Monitor K+ now [place] [id number]  Heart Transplant  [facility] - Cardiology Stepdown

## 2023-07-30 NOTE — NURSING
Notified  GILDARDO Cain pt c/o 7/10 LUQ abdomen pain like yesterday, Norco given, WCTM. 1000 pt said no more pain after norco given.

## 2023-07-30 NOTE — ASSESSMENT & PLAN NOTE
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  -Continue Coumadin, Goal INR 2.0-3.0. Therapeutic today at 2.6  -LDH is stable.  Will continue to monitor daily.  -Speed set at 5100, LSL 4700 rpm  -Getting TTE to R/O endocarditis given persistent MSSA bacteremia    Procedure: Device Interrogation Including analysis of device parameters  Current Settings: Ventricular Assist Device  Review of device function is stable/unstable stable    TXP LVAD INTERROGATIONS 7/30/2023 7/30/2023 7/29/2023 7/29/2023 7/29/2023 7/29/2023 7/29/2023   Type HeartMate3 HeartMate3 HeartMate3 HeartMate3 HeartMate3 HeartMate3 HeartMate3   Flow 3.9 4.1 4.2 4.1 4.5 4.2 4.2   Speed 5100 5100 5100 5100 5100 5100 5100   PI 6.4 5.8 4.7 5.0 4.4 4.7 5.2   Power (Serna) 3.8 3.7 3.6 3.7 3.7 3.7 3.6   LSL 4700 4700 4700 4700 4700 4700 4700   Low Flow Alarm - - - - - - -   High Power Alarm - - - - - - -   Pulsatility Pulse Pulse Pulse Pulse Pulse Pulse Pulse

## 2023-07-30 NOTE — PLAN OF CARE
Problem: Adult Inpatient Plan of Care  Goal: Plan of Care Review  7/29/2023 1946 by Ken Herman RN  Outcome: Ongoing, Progressing  7/29/2023 1946 by Ken Herman RN  Outcome: Ongoing, Progressing  Goal: Patient-Specific Goal (Individualized)  7/29/2023 1946 by Ken Herman RN  Outcome: Ongoing, Progressing  7/29/2023 1946 by Ken Herman RN  Outcome: Ongoing, Progressing  Goal: Absence of Hospital-Acquired Illness or Injury  7/29/2023 1946 by Ken Herman RN  Outcome: Ongoing, Progressing  7/29/2023 1946 by Ken Herman RN  Outcome: Ongoing, Progressing  Goal: Optimal Comfort and Wellbeing  7/29/2023 1946 by Ken Herman RN  Outcome: Ongoing, Progressing  7/29/2023 1946 by Ken Herman RN  Outcome: Ongoing, Progressing  Goal: Readiness for Transition of Care  7/29/2023 1946 by Ken Herman RN  Outcome: Ongoing, Progressing  7/29/2023 1946 by Ken Herman RN  Outcome: Ongoing, Progressing     Problem: Diabetes Comorbidity  Goal: Blood Glucose Level Within Targeted Range  7/29/2023 1946 by Ken Herman RN  Outcome: Ongoing, Progressing  7/29/2023 1946 by Ken Herman RN  Outcome: Ongoing, Progressing     Problem: Infection  Goal: Absence of Infection Signs and Symptoms  Outcome: Ongoing, Progressing     Problem: Adjustment to Illness (Sepsis/Septic Shock)  Goal: Optimal Coping  Outcome: Ongoing, Progressing     Problem: Bleeding (Sepsis/Septic Shock)  Goal: Absence of Bleeding  Outcome: Ongoing, Progressing     Problem: Glycemic Control Impaired (Sepsis/Septic Shock)  Goal: Blood Glucose Level Within Desired Range  Outcome: Ongoing, Progressing     Problem: Infection Progression (Sepsis/Septic Shock)  Goal: Absence of Infection Signs and Symptoms  Outcome: Ongoing, Progressing     Problem: Nutrition Impaired (Sepsis/Septic Shock)  Goal: Optimal Nutrition Intake  Outcome: Ongoing, Progressing     Problem: Fluid Imbalance (Pneumonia)  Goal: Fluid Balance  Outcome: Ongoing, Progressing      Problem: Infection (Pneumonia)  Goal: Resolution of Infection Signs and Symptoms  Outcome: Ongoing, Progressing     Problem: Respiratory Compromise (Pneumonia)  Goal: Effective Oxygenation and Ventilation  Outcome: Ongoing, Progressing     Problem: Fall Injury Risk  Goal: Absence of Fall and Fall-Related Injury  Outcome: Ongoing, Progressing     Problem: Hemodynamic Instability (Ventricular Assist Device)  Goal: Optimal Blood Flow  Outcome: Ongoing, Progressing     Problem: Infection (Ventricular Assist Device)  Goal: Absence of Infection Signs and Symptoms  Outcome: Ongoing, Progressing     Problem: Right-Sided Heart Compromise (Ventricular Assist Device)  Goal: Effective Right-Sided Heart Function  Outcome: Ongoing, Progressing

## 2023-07-30 NOTE — PLAN OF CARE
Chart reviewed - prior line pulled and repeat blcx in process. Blcx from 7/28 with SA thus far. No fevers documented overnight.       Recommendations:  -continue ancef  -follow up cx/tte

## 2023-07-30 NOTE — CARE UPDATE
Care Update:     No acute events overnight. Patient on the CSU in room 314/314 A. Blood glucose stable. BG above goal on current insulin regimen (SSI, prandial, and basal insulin ). Steroid use- Dexamethasone  6 mg (dose 9/10).    Renal function- Normal   Vasopressors-  None       Diet diabetic [facility]; 2000 Calorie; Double Portions; Fluid - 2000mL     POCT Glucose   Date Value Ref Range Status   07/30/2023 261 (H) 70 - 110 mg/dL Final   07/30/2023 313 (H) 70 - 110 mg/dL Final   07/29/2023 239 (H) 70 - 110 mg/dL Final   07/29/2023 337 (H) 70 - 110 mg/dL Final   07/29/2023 333 (H) 70 - 110 mg/dL Final   07/29/2023 193 (H) 70 - 110 mg/dL Final   07/29/2023 226 (H) 70 - 110 mg/dL Final   07/28/2023 282 (H) 70 - 110 mg/dL Final   07/28/2023 149 (H) 70 - 110 mg/dL Final   07/28/2023 262 (H) 70 - 110 mg/dL Final   07/28/2023 219 (H) 70 - 110 mg/dL Final   07/27/2023 276 (H) 70 - 110 mg/dL Final   07/27/2023 246 (H) 70 - 110 mg/dL Final   07/27/2023 268 (H) 70 - 110 mg/dL Final   07/27/2023 214 (H) 70 - 110 mg/dL Final   07/27/2023 281 (H) 70 - 110 mg/dL Final     Lab Results   Component Value Date    HGBA1C 6.7 (H) 07/22/2023       Diabetes Medications               insulin aspart U-100 (NOVOLOG) 100 unit/mL (3 mL) InPn pen Inject 16 Units into the skin 3 (three) times daily with meals. Plus Sliding Scale: 151-200: +1, 201-250: +2, 251-300: +3, 301-350: +4 and call MD; Max Daily Dose: 60 units    insulin detemir U-100, Levemir, 100 unit/mL (3 mL) SubQ InPn pen Inject 22 Units into the skin 2 (two) times daily.        Endocrine  Type 2 diabetes mellitus with hyperglycemia  BG goal 140-180     - Levemir to 30 units BID (20% increase due to fasting blood glucose above goal)  - Novolog to 30 units TID with meals (20% increase due to prandial blood glucose  above goal)   - Liriano to High Dose Correction Scale      - BG monitoring ac/hs     Of note, excursions likely due to double portions in the presence of high-dose  steroids.     ** Please notify Endocrine for any change and/or advance in diet**  ** Please call Endocrine for any BG related issues **    Discharge Planning:   TBD. Please notify endocrinology prior to discharge.      Michael Wyman DNP, FNP-C  Department of Endocrinology  Inpatient Glycemic Management

## 2023-07-31 LAB
ALBUMIN SERPL BCP-MCNC: 2.5 G/DL (ref 3.5–5.2)
ALP SERPL-CCNC: 150 U/L (ref 55–135)
ALT SERPL W/O P-5'-P-CCNC: 32 U/L (ref 10–44)
ANION GAP SERPL CALC-SCNC: 10 MMOL/L (ref 8–16)
ANISOCYTOSIS BLD QL SMEAR: SLIGHT
APTT PPP: 33.8 SEC (ref 21–32)
ASCENDING AORTA: 3.01 CM
AST SERPL-CCNC: 39 U/L (ref 10–40)
BACTERIA BLD CULT: ABNORMAL
BASOPHILS # BLD AUTO: ABNORMAL K/UL (ref 0–0.2)
BASOPHILS NFR BLD: 0 % (ref 0–1.9)
BILIRUB SERPL-MCNC: 0.7 MG/DL (ref 0.1–1)
BNP SERPL-MCNC: 159 PG/ML (ref 0–99)
BSA FOR ECHO PROCEDURE: 2.19 M2
BUN SERPL-MCNC: 36 MG/DL (ref 6–20)
CALCIUM SERPL-MCNC: 9.9 MG/DL (ref 8.7–10.5)
CHLORIDE SERPL-SCNC: 97 MMOL/L (ref 95–110)
CO2 SERPL-SCNC: 25 MMOL/L (ref 23–29)
CREAT SERPL-MCNC: 1.4 MG/DL (ref 0.5–1.4)
CRP SERPL-MCNC: 31.8 MG/L (ref 0–8.2)
CV ECHO LV RWT: 0.22 CM
DIFFERENTIAL METHOD: ABNORMAL
DOP CALC LVOT AREA: 4.3 CM2
DOP CALC LVOT DIAMETER: 2.33 CM
ECHO LV POSTERIOR WALL: 0.77 CM (ref 0.6–1.1)
EOSINOPHIL # BLD AUTO: ABNORMAL K/UL (ref 0–0.5)
EOSINOPHIL NFR BLD: 0 % (ref 0–8)
ERYTHROCYTE [DISTWIDTH] IN BLOOD BY AUTOMATED COUNT: 24.3 % (ref 11.5–14.5)
EST. GFR  (NO RACE VARIABLE): >60 ML/MIN/1.73 M^2
FRACTIONAL SHORTENING: 6 % (ref 28–44)
GLUCOSE SERPL-MCNC: 307 MG/DL (ref 70–110)
HCT VFR BLD AUTO: 36.1 % (ref 40–54)
HGB BLD-MCNC: 11.7 G/DL (ref 14–18)
HYPOCHROMIA BLD QL SMEAR: ABNORMAL
IMM GRANULOCYTES # BLD AUTO: ABNORMAL K/UL (ref 0–0.04)
IMM GRANULOCYTES NFR BLD AUTO: ABNORMAL % (ref 0–0.5)
INR PPP: 3.4 (ref 0.8–1.2)
INTERVENTRICULAR SEPTUM: 0.73 CM (ref 0.6–1.1)
LA MAJOR: 5.9 CM
LA MINOR: 5.34 CM
LA WIDTH: 4.52 CM
LDH SERPL L TO P-CCNC: 477 U/L (ref 110–260)
LEFT ATRIUM SIZE: 4.37 CM
LEFT ATRIUM VOLUME INDEX: 43 ML/M2
LEFT ATRIUM VOLUME: 94.12 CM3
LEFT INTERNAL DIMENSION IN SYSTOLE: 6.46 CM (ref 2.1–4)
LEFT VENTRICLE DIASTOLIC VOLUME INDEX: 111.84 ML/M2
LEFT VENTRICLE DIASTOLIC VOLUME: 244.93 ML
LEFT VENTRICLE MASS INDEX: 100 G/M2
LEFT VENTRICLE SYSTOLIC VOLUME INDEX: 97.3 ML/M2
LEFT VENTRICLE SYSTOLIC VOLUME: 213.18 ML
LEFT VENTRICULAR INTERNAL DIMENSION IN DIASTOLE: 6.87 CM (ref 3.5–6)
LEFT VENTRICULAR MASS: 218.7 G
LVAD BASE RATE: 5100 RPM
LYMPHOCYTES # BLD AUTO: ABNORMAL K/UL (ref 1–4.8)
LYMPHOCYTES NFR BLD: 9 % (ref 18–48)
MAGNESIUM SERPL-MCNC: 2 MG/DL (ref 1.6–2.6)
MCH RBC QN AUTO: 27.1 PG (ref 27–31)
MCHC RBC AUTO-ENTMCNC: 32.4 G/DL (ref 32–36)
MCV RBC AUTO: 84 FL (ref 82–98)
MONOCYTES # BLD AUTO: ABNORMAL K/UL (ref 0.3–1)
MONOCYTES NFR BLD: 2 % (ref 4–15)
NEUTROPHILS # BLD AUTO: ABNORMAL K/UL (ref 1.8–7.7)
NEUTROPHILS NFR BLD: 89 % (ref 38–73)
NRBC BLD-RTO: 0 /100 WBC
OVALOCYTES BLD QL SMEAR: ABNORMAL
PHOSPHATE SERPL-MCNC: 4.1 MG/DL (ref 2.7–4.5)
PISA TR MAX VEL: 2.72 M/S
PLATELET # BLD AUTO: 564 K/UL (ref 150–450)
PMV BLD AUTO: 9.1 FL (ref 9.2–12.9)
POCT GLUCOSE: 179 MG/DL (ref 70–110)
POCT GLUCOSE: 221 MG/DL (ref 70–110)
POCT GLUCOSE: 272 MG/DL (ref 70–110)
POCT GLUCOSE: 320 MG/DL (ref 70–110)
POCT GLUCOSE: 322 MG/DL (ref 70–110)
POCT GLUCOSE: 327 MG/DL (ref 70–110)
POIKILOCYTOSIS BLD QL SMEAR: SLIGHT
POLYCHROMASIA BLD QL SMEAR: ABNORMAL
POTASSIUM SERPL-SCNC: 4.5 MMOL/L (ref 3.5–5.1)
PREALB SERPL-MCNC: 26 MG/DL (ref 20–43)
PROT SERPL-MCNC: 8.1 G/DL (ref 6–8.4)
PROTHROMBIN TIME: 34 SEC (ref 9–12.5)
RA MAJOR: 5.8 CM
RA PRESSURE ESTIMATED: 3 MMHG
RA WIDTH: 4.84 CM
RBC # BLD AUTO: 4.32 M/UL (ref 4.6–6.2)
RIGHT VENTRICULAR END-DIASTOLIC DIMENSION: 5.02 CM
RV TB RVSP: 6 MMHG
SCHISTOCYTES BLD QL SMEAR: ABNORMAL
SINUS: 3.06 CM
SODIUM SERPL-SCNC: 132 MMOL/L (ref 136–145)
SPHEROCYTES BLD QL SMEAR: ABNORMAL
STJ: 2.56 CM
TDI SEPTAL: 0.06 M/S
TR MAX PG: 30 MMHG
TRICUSPID ANNULAR PLANE SYSTOLIC EXCURSION: 0.68 CM
WBC # BLD AUTO: 35.42 K/UL (ref 3.9–12.7)
Z-SCORE OF LEFT VENTRICULAR DIMENSION IN END DIASTOLE: -0.74
Z-SCORE OF LEFT VENTRICULAR DIMENSION IN END SYSTOLE: 2.86

## 2023-07-31 PROCEDURE — 83615 LACTATE (LD) (LDH) ENZYME: CPT | Performed by: HOSPITALIST

## 2023-07-31 PROCEDURE — 25000003 PHARM REV CODE 250: Performed by: NURSE PRACTITIONER

## 2023-07-31 PROCEDURE — 93750 INTERROGATION VAD IN PERSON: CPT | Mod: ,,, | Performed by: INTERNAL MEDICINE

## 2023-07-31 PROCEDURE — 27000207 HC ISOLATION

## 2023-07-31 PROCEDURE — 99233 PR SUBSEQUENT HOSPITAL CARE,LEVL III: ICD-10-PCS | Mod: ,,, | Performed by: PHYSICIAN ASSISTANT

## 2023-07-31 PROCEDURE — 84134 ASSAY OF PREALBUMIN: CPT | Performed by: HOSPITALIST

## 2023-07-31 PROCEDURE — 86140 C-REACTIVE PROTEIN: CPT | Performed by: HOSPITALIST

## 2023-07-31 PROCEDURE — 63600175 PHARM REV CODE 636 W HCPCS: Performed by: INTERNAL MEDICINE

## 2023-07-31 PROCEDURE — 99233 SBSQ HOSP IP/OBS HIGH 50: CPT | Mod: ,,, | Performed by: STUDENT IN AN ORGANIZED HEALTH CARE EDUCATION/TRAINING PROGRAM

## 2023-07-31 PROCEDURE — 99232 PR SUBSEQUENT HOSPITAL CARE,LEVL II: ICD-10-PCS | Mod: ,,, | Performed by: NURSE PRACTITIONER

## 2023-07-31 PROCEDURE — 25000003 PHARM REV CODE 250: Performed by: INTERNAL MEDICINE

## 2023-07-31 PROCEDURE — 25000003 PHARM REV CODE 250: Performed by: HOSPITALIST

## 2023-07-31 PROCEDURE — 63600175 PHARM REV CODE 636 W HCPCS: Performed by: HOSPITALIST

## 2023-07-31 PROCEDURE — 99233 PR SUBSEQUENT HOSPITAL CARE,LEVL III: ICD-10-PCS | Mod: ,,, | Performed by: STUDENT IN AN ORGANIZED HEALTH CARE EDUCATION/TRAINING PROGRAM

## 2023-07-31 PROCEDURE — 85610 PROTHROMBIN TIME: CPT | Performed by: HOSPITALIST

## 2023-07-31 PROCEDURE — 99233 SBSQ HOSP IP/OBS HIGH 50: CPT | Mod: ,,, | Performed by: PHYSICIAN ASSISTANT

## 2023-07-31 PROCEDURE — 84100 ASSAY OF PHOSPHORUS: CPT | Performed by: HOSPITALIST

## 2023-07-31 PROCEDURE — 85730 THROMBOPLASTIN TIME PARTIAL: CPT | Performed by: STUDENT IN AN ORGANIZED HEALTH CARE EDUCATION/TRAINING PROGRAM

## 2023-07-31 PROCEDURE — 85027 COMPLETE CBC AUTOMATED: CPT | Performed by: NURSE PRACTITIONER

## 2023-07-31 PROCEDURE — 80053 COMPREHEN METABOLIC PANEL: CPT | Performed by: NURSE PRACTITIONER

## 2023-07-31 PROCEDURE — 83880 ASSAY OF NATRIURETIC PEPTIDE: CPT | Performed by: HOSPITALIST

## 2023-07-31 PROCEDURE — 94761 N-INVAS EAR/PLS OXIMETRY MLT: CPT

## 2023-07-31 PROCEDURE — 83735 ASSAY OF MAGNESIUM: CPT | Performed by: HOSPITALIST

## 2023-07-31 PROCEDURE — 99232 SBSQ HOSP IP/OBS MODERATE 35: CPT | Mod: ,,, | Performed by: NURSE PRACTITIONER

## 2023-07-31 PROCEDURE — 93750 PR INTERROGATE VENT ASSIST DEV, IN PERSON, W PHYSICIAN ANALYSIS: ICD-10-PCS | Mod: ,,, | Performed by: INTERNAL MEDICINE

## 2023-07-31 PROCEDURE — 85007 BL SMEAR W/DIFF WBC COUNT: CPT | Performed by: NURSE PRACTITIONER

## 2023-07-31 PROCEDURE — 27000248 HC VAD-ADDITIONAL DAY

## 2023-07-31 PROCEDURE — 20600001 HC STEP DOWN PRIVATE ROOM

## 2023-07-31 RX ORDER — INSULIN ASPART 100 [IU]/ML
36 INJECTION, SOLUTION INTRAVENOUS; SUBCUTANEOUS
Status: DISCONTINUED | OUTPATIENT
Start: 2023-07-31 | End: 2023-08-01

## 2023-07-31 RX ADMIN — DEXAMETHASONE 6 MG: 4 TABLET ORAL at 08:07

## 2023-07-31 RX ADMIN — ASPIRIN 81 MG: 81 TABLET, COATED ORAL at 08:07

## 2023-07-31 RX ADMIN — VALSARTAN 40 MG: 40 TABLET, FILM COATED ORAL at 08:07

## 2023-07-31 RX ADMIN — FUROSEMIDE 80 MG: 80 TABLET ORAL at 08:07

## 2023-07-31 RX ADMIN — INSULIN ASPART 30 UNITS: 100 INJECTION, SOLUTION INTRAVENOUS; SUBCUTANEOUS at 08:07

## 2023-07-31 RX ADMIN — SENNOSIDES AND DOCUSATE SODIUM 2 TABLET: 50; 8.6 TABLET ORAL at 08:07

## 2023-07-31 RX ADMIN — BUSPIRONE HYDROCHLORIDE 10 MG: 10 TABLET ORAL at 08:07

## 2023-07-31 RX ADMIN — LEVETIRACETAM 1000 MG: 500 TABLET, FILM COATED ORAL at 08:07

## 2023-07-31 RX ADMIN — HYDROCODONE BITARTRATE AND ACETAMINOPHEN 1 TABLET: 5; 325 TABLET ORAL at 08:07

## 2023-07-31 RX ADMIN — INSULIN DETEMIR 30 UNITS: 100 INJECTION, SOLUTION SUBCUTANEOUS at 08:07

## 2023-07-31 RX ADMIN — INSULIN ASPART 36 UNITS: 100 INJECTION, SOLUTION INTRAVENOUS; SUBCUTANEOUS at 12:07

## 2023-07-31 RX ADMIN — MILRINONE LACTATE 0.25 MCG/KG/MIN: 0.2 INJECTION, SOLUTION INTRAVENOUS at 08:07

## 2023-07-31 RX ADMIN — INSULIN ASPART 12 UNITS: 100 INJECTION, SOLUTION INTRAVENOUS; SUBCUTANEOUS at 08:07

## 2023-07-31 RX ADMIN — MILRINONE LACTATE 0.25 MCG/KG/MIN: 0.2 INJECTION, SOLUTION INTRAVENOUS at 06:07

## 2023-07-31 RX ADMIN — INSULIN ASPART 2 UNITS: 100 INJECTION, SOLUTION INTRAVENOUS; SUBCUTANEOUS at 08:07

## 2023-07-31 RX ADMIN — MIRTAZAPINE 15 MG: 15 TABLET, FILM COATED ORAL at 08:07

## 2023-07-31 RX ADMIN — INSULIN ASPART 12 UNITS: 100 INJECTION, SOLUTION INTRAVENOUS; SUBCUTANEOUS at 12:07

## 2023-07-31 RX ADMIN — INSULIN ASPART 36 UNITS: 100 INJECTION, SOLUTION INTRAVENOUS; SUBCUTANEOUS at 04:07

## 2023-07-31 RX ADMIN — MUPIROCIN: 20 OINTMENT TOPICAL at 08:07

## 2023-07-31 RX ADMIN — CEFAZOLIN 8 G: 2 INJECTION, POWDER, FOR SOLUTION INTRAMUSCULAR; INTRAVENOUS at 10:07

## 2023-07-31 RX ADMIN — INSULIN ASPART 6 UNITS: 100 INJECTION, SOLUTION INTRAVENOUS; SUBCUTANEOUS at 04:07

## 2023-07-31 NOTE — ASSESSMENT & PLAN NOTE
-NICM  -Last 2D Echo 10/31/22: LVEF 10%, LVEDD 7.25 cm  -Euvolemic on examination today  -Current diuretic regimen:Lasix 40mg Qdaily  -Status post LVAD.  -GDMT: On Valsartan, Aldactone and Toprol at home. Valsartan 40 mg po bid resumed. K+ has trended up at 4.8 - D/C'd KCL 40 mEq bid. -Monitor K+ now that ARB resumed  -2g Na dietary restriction, 1500 mL fluid restriction, strict I/Os

## 2023-07-31 NOTE — ASSESSMENT & PLAN NOTE
-H/O chronic MSSA DLES infection, on suppressive Doxycyline at home  -See above sepsis.  Now with new gram + bacteremia  -On Cefaxolin   -ID following

## 2023-07-31 NOTE — PROGRESS NOTES
[facility] - Cardiology Stepdown  Endocrinology  Progress Note    Admit Date: 7/22/2023     Reason for Consult: Management of T2DM, Hyperglycemia     Surgical Procedure and Date: HM3 implantation 6/23/2022    Diabetes diagnosis year:  2022    Home Diabetes Medications:  Levemir 22 units BID, Novolog 16 units TID with meals in addition to SSI (151-200/+1)     How often checking glucose at home? Freestyle William    BG readings on regimen: 170-low 200s  Hypoglycemia on the regimen?  No  Missed doses on regimen?  Yes    Diabetes Complications include:     Hyperglycemia     Complicating diabetes co morbidities:   History of MI, CHF, and CKD         HPI:   Patient is a 38 y.o. 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 He underwent RVAD removal and chest closure 6/30/2022.  He was weaned off  but he had to restarted due to RVF. He was eventually transitioned to milrinone (secondary to  shortage) now on 0.25 mcg/kg/min.  Patient also has history of driveline infection.  Patient presented to the emergency room today because of pleuritic chest pain that has been going on for the last 3 days however it was more intense today.  Also complains of having shortness of breath and fevers associated with it.  He was spiking fever of 102 in the emergency room with elevated white blood cell count up to 17,000. He was treated with Zosyn.  He was sent here for admission.  Patient was admitted a month back for COVID infection for which he received remdesivir for 3 days. Patient has ground-glass opacities on imaging from outside hospital. Endocrinology consulted for management of T2DM.      Lab Results   Component Value Date    HGBA1C 6.7 (H) 07/22/2023           Interval HPI:   Overnight events: No acute events overnight.  "Patient in room 314/314 A. Blood glucose stable. BG above goal on current insulin regimen (SSI, prandial, and basal insulin ). Steroid use- Dexamethasone  6 mg (dose 10/10).    Renal function- Normal   Vasopressors-  None       Endocrine will continue to follow and manage insulin orders inpatient.         Diet diabetic [facility]; 2000 Calorie; Double Portions; Fluid - 2000mL     Eatin%  Nausea: No  Hypoglycemia and intervention: No  Fever: No  TPN and/or TF: No      BP 94/62 (BP Location: Left arm, Patient Position: Lying)   Pulse 96   Temp 97.7 °F (36.5 °C) (Oral)   Resp 18   Ht 6' 3" (1.905 m)   Wt 90.6 kg (199 lb 11.8 oz)   SpO2 97%   BMI 24.97 kg/m²     Labs Reviewed and Include    Recent Labs   Lab 23  0501   *   CALCIUM 9.9   ALBUMIN 2.5*   PROT 8.1   *   K 4.5   CO2 25   CL 97   BUN 36*   CREATININE 1.4   ALKPHOS 150*   ALT 32   AST 39   BILITOT 0.7     Lab Results   Component Value Date    WBC 35.42 (H) 2023    HGB 11.7 (L) 2023    HCT 36.1 (L) 2023    MCV 84 2023     (H) 2023     No results for input(s): "TSH", "FREET4" in the last 168 hours.  Lab Results   Component Value Date    HGBA1C 6.7 (H) 2023       Nutritional status:   Body mass index is 24.97 kg/m².  Lab Results   Component Value Date    ALBUMIN 2.5 (L) 2023    ALBUMIN 2.2 (L) 2023    ALBUMIN 2.2 (L) 2023     Lab Results   Component Value Date    PREALBUMIN 26 2023    PREALBUMIN 18 (L) 2023    PREALBUMIN 14 (L) 2023       Estimated Creatinine Clearance: 85.5 mL/min (based on SCr of 1.4 mg/dL).    Accu-Checks  Recent Labs     23  1620 23  2029 23  0155 23  0800 23  1158 23  1603 23  1934 23  2040 23  2346 23  0757   POCTGLUCOSE 337* 239* 313* 261* 216* 171* 272* 193* 285* 327*       Current Medications and/or Treatments Impacting Glycemic Control  Immunotherapy:  "   Immunosuppressants       None          Steroids:   Hormones (From admission, onward)      None          Pressors:    Autonomic Drugs (From admission, onward)      None          Hyperglycemia/Diabetes Medications:   Antihyperglycemics (From admission, onward)      Start     Stop Route Frequency Ordered    07/31/23 2100  insulin detemir U-100 (Levemir) pen 33 Units         -- SubQ 2 times daily 07/31/23 0925    07/31/23 1130  insulin aspart U-100 pen 36 Units         -- SubQ 3 times daily with meals 07/31/23 0925    07/30/23 0907  insulin aspart U-100 pen 0-15 Units         -- SubQ Before meals, nightly and at 0200 PRN 07/30/23 0807            ASSESSMENT and PLAN    Cardiac/Vascular  LVAD (left ventricular assist device) present  Managed per primary team  Avoid hypoglycemia        ID  * Sepsis  Managed per primary team        Endocrine  Type 2 diabetes mellitus with hyperglycemia  BG goal 140-180    Increae Levemir to 33 units  BID (20% dose increase; fasting BG above goal ranges) Home dosing   Increase Novolog to 26 units TID with meals (20% dose increase; prandial BG excursions noted)   Continue Moderate Dose Correction Scale  BG monitoring ac/hs    ** Please call Endocrine for any BG related issues **    Discharge plans: TBD    Lab Results   Component Value Date    HGBA1C 6.7 (H) 07/22/2023                  Michael Wyman, DNP, FNP  Endocrinology  [facility] - Cardiology Stepdown

## 2023-07-31 NOTE — ASSESSMENT & PLAN NOTE
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  -Continue Coumadin, Goal INR 2.0-3.0. Supratherapetuic at 3.4   -LDH is stable.  Will continue to monitor daily.  -Speed set at 5100, LSL 4700 rpm  -Getting TTE to R/O endocarditis given persistent MSSA bacteremia    Procedure: Device Interrogation Including analysis of device parameters  Current Settings: Ventricular Assist Device  Review of device function is stable    TXP LVAD INTERROGATIONS 7/31/2023 7/31/2023 7/30/2023 7/30/2023 7/30/2023 7/30/2023 7/30/2023   Type HeartMate3 HeartMate3 HeartMate3 HeartMate3 HeartMate3 HeartMate3 HeartMate3   Flow 4.6 4.4 4.2 4.4 4.9 4.5 3.9   Speed 5100 5100 5100 5100 5100 5100 5100   PI 4.4 3.8 3.9 4.7 3.8 4.7 6.4   Power (Serna) 3.7 3.4 3.7 3.2 3.7 3.7 3.8   LSL 4700 4700 4700 4700 4700 4700 4700   Low Flow Alarm - - - - - - -   High Power Alarm - - - - - - -   Pulsatility Pulse Pulse Pulse Pulse Pulse Pulse Pulse

## 2023-07-31 NOTE — NURSING
Notified MARY Fregoso pt c/o 7/10 LUQ abdomen pain like yesterday, Norco given, WCTM. 1000: pt denies any pain.

## 2023-07-31 NOTE — NURSING
Pt's VAD dressing changed per protocol using kit and sterile technique. Pt's drive line site is clean, dry, intact with no drainage at site or  old dressing ; DLES is + (1). No kinks or frays on drive line, secured with melendez anchor. Pt tolerated dressing change well. Next dressing change due 08/01/2023.

## 2023-07-31 NOTE — PROGRESS NOTES
Checked with patient's bedside RN if patient had any needs. RN confirmed patient has dressing supplies at bedside. Reports patients stated his only need is to go home. Will continue to monitor patient.

## 2023-07-31 NOTE — NURSING
Notified endo team and PA. Zenobia  before breakfast and 322 before lunch, insulin given per order.

## 2023-07-31 NOTE — PROGRESS NOTES
07/31/2023  Mirela Perez    Current provider:  Marlo Marques MD    Device interrogation:  TXP LVAD INTERROGATIONS 7/31/2023 7/31/2023 7/31/2023 7/30/2023 7/30/2023 7/30/2023 7/30/2023   Type HeartMate3 HeartMate3 HeartMate3 HeartMate3 HeartMate3 HeartMate3 HeartMate3   Flow 4.6 4.6 4.4 4.2 4.4 4.9 4.5   Speed 5100 5100 5100 5100 5100 5100 5100   PI 3.9 4.4 3.8 3.9 4.7 3.8 4.7   Power (Serna) 3.7 3.7 3.4 3.7 3.2 3.7 3.7   LSL 4700 4700 4700 4700 4700 4700 4700   Low Flow Alarm - - - - - - -   High Power Alarm - - - - - - -   Pulsatility Pulse Pulse Pulse Pulse Pulse Pulse Pulse          Rounded on Kevan Queen to ensure all mechanical assist device settings (IABP or VAD) were appropriate and all parameters were within limits.  I was able to ensure all back up equipment was present, the staff had no issues, and the Perfusion Department daily rounding was complete.      For implantable VADs: Interrogation of Ventricular assist device was performed with analysis of device parameters and review of device function. I have personally reviewed the interrogation findings and agree with findings as stated.     In emergency, the nursing units have been notified to contact the perfusion department either by:  Calling [id number] from 630am to 4pm Mon thru Fri, utilizing the On-Call Finder functionality of Epic and searching for Perfusion, or by contacting the hospital  from 4pm to 630am and on weekends and asking to speak with the perfusionist on call.    2:10 PM

## 2023-07-31 NOTE — SUBJECTIVE & OBJECTIVE
Interval History:  No issues. Pt reports tolerating abx without adverse reaction. Old line removed over the weekend with subsequent blood cx ngtd. TTE pending. Denies new joint/back pain.       Review of Systems  Objective:     Vital Signs (Most Recent):  Temp: 97.7 °F (36.5 °C) (07/31/23 0750)  Pulse: (!) 116 (07/31/23 1000)  Resp: 18 (07/31/23 0849)  BP: 94/62 (07/31/23 0752)  SpO2: 97 % (07/31/23 0750) Vital Signs (24h Range):  Temp:  [97.7 °F (36.5 °C)-98.4 °F (36.9 °C)] 97.7 °F (36.5 °C)  Pulse:  [] 116  Resp:  [18-20] 18  SpO2:  [96 %-99 %] 97 %  BP: ()/(0-78) 94/62     Weight: 90.6 kg (199 lb 11.8 oz)  Body mass index is 24.97 kg/m².    Estimated Creatinine Clearance: 85.5 mL/min (based on SCr of 1.4 mg/dL).     Physical Exam  Constitutional:       General: He is not in acute distress.     Appearance: He is not ill-appearing or toxic-appearing.   HENT:      Head: Normocephalic and atraumatic.      Right Ear: External ear normal.      Left Ear: External ear normal.   Eyes:      General: No scleral icterus.        Right eye: No discharge.         Left eye: No discharge.   Cardiovascular:      Comments: VAD  Pulmonary:      Effort: Pulmonary effort is normal. No respiratory distress.   Abdominal:      General: There is no distension.      Palpations: Abdomen is soft.      Tenderness: There is no abdominal tenderness. There is no guarding.      Comments: DLES dressed - c/d/i   Musculoskeletal:         General: No swelling or tenderness.      Right lower leg: No edema.      Left lower leg: No edema.   Skin:     General: Skin is warm and dry.      Findings: No erythema.   Neurological:      Mental Status: He is alert and oriented to person, place, and time.          Significant Labs:   Microbiology Results (last 7 days)       Procedure Component Value Units Date/Time    Blood culture [[id number]]  (Abnormal)  (Susceptibility) Collected: 07/28/23 0682    Order Status: Completed Specimen: Blood from  Antecubital, Left Arm Updated: 07/31/23 1026     Blood Culture, Routine Gram stain aer bottle: Gram positive cocci in clusters resembling Staph      Positive results previously called 07/29/2023  21:20      STAPHYLOCOCCUS AUREUS  ID consult required at [facility].      Blood culture [[id number]] Collected: 07/30/23 0810    Order Status: Completed Specimen: Blood Updated: 07/31/23 1012     Blood Culture, Routine No Growth to date      No Growth to date    Blood culture [[id number]] Collected: 07/30/23 0810    Order Status: Completed Specimen: Blood Updated: 07/31/23 1012     Blood Culture, Routine No Growth to date      No Growth to date    Blood culture [[id number]]  (Abnormal) Collected: 07/28/23 0940    Order Status: Completed Specimen: Blood from Peripheral, Left Hand Updated: 07/31/23 0941     Blood Culture, Routine Gram stain aer bottle: Gram positive cocci in clusters resembling Staph      Positive results previously called 07/30/2023  13:09      STAPHYLOCOCCUS AUREUS  ID consult required at [facility].  For susceptibility see order #[id number]      Blood culture [[id number]] Collected: 07/29/23 1130    Order Status: Completed Specimen: Blood Updated: 07/30/23 1412     Blood Culture, Routine No Growth to date      No Growth to date    Narrative:      Please obtain 2 sets after RIJ is pulled, thank you    Blood culture [[id number]] Collected: 07/29/23 1132    Order Status: Completed Specimen: Blood Updated: 07/30/23 1412     Blood Culture, Routine No Growth to date      No Growth to date    Narrative:      Please obtain 2 sets after RIJ is pulled, thank you  Rt hand    Blood culture [[id number]] Collected: 07/25/23 0450    Order Status: Completed Specimen: Blood Updated: 07/30/23 0612     Blood Culture, Routine No growth after 5 days.    Blood culture [[id number]]  (Abnormal)  (Susceptibility) Collected: 07/25/23 0451    Order Status: Completed Specimen:  Blood Updated: 07/29/23 1045     Blood Culture, Routine Gram stain aer bottle: Gram positive cocci in clusters resembling Staph      Results called to and read back by:Laura Kilgore RN 07/27/2023  13:01      STAPHYLOCOCCUS AUREUS  ID consult required at [facility].      MRSA/SA Rapid ID by PCR from Blood culture [[id number]]  (Abnormal) Collected: 07/25/23 0451    Order Status: Completed Updated: 07/27/23 1438     Staph aureus ID by PCR Positive     Methicillin Resistant ID by PCR Negative    Blood culture [[id number]]  (Abnormal)  (Susceptibility) Collected: 07/23/23 1305    Order Status: Completed Specimen: Blood from Line, Jugular, Internal Right Updated: 07/26/23 1120     Blood Culture, Routine Gram stain aer bottle: Gram positive cocci in clusters resembling Staph      Results called to and read back by:Cong Urena RN 07/24/2023  16:05      STAPHYLOCOCCUS AUREUS  ID consult required at [facility].      Culture, Anaerobe [[id number]] Collected: 07/22/23 0159    Order Status: Completed Specimen: Abscess from Abdomen Updated: 07/25/23 1134     Anaerobic Culture No anaerobes isolated    Blood culture [[id number]]  (Abnormal)  (Susceptibility) Collected: 07/22/23 1058    Order Status: Completed Specimen: Blood from Peripheral, Hand, Right Updated: 07/25/23 1014     Blood Culture, Routine Gram stain aer bottle: Gram positive cocci in clusters resembling Staph      Results called to and read back by:Zaheer Rae RN 07/23/2023  06:41      STAPHYLOCOCCUS AUREUS  ID consult required at [facility].      Aerobic culture [[id number]]  (Abnormal)  (Susceptibility) Collected: 07/22/23 0159    Order Status: Completed Specimen: Abscess from Abdomen Updated: 07/24/23 1113     Aerobic Bacterial Culture STAPHYLOCOCCUS AUREUS  Few              Significant Imaging: I have reviewed all pertinent imaging results/findings within the past 24 hours.

## 2023-07-31 NOTE — ASSESSMENT & PLAN NOTE
-Patient presents with elevated fevers, white blood cell count, elevated lactic acid.   -Possible Secondary to COVID-19 infection.  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 Repeated 7/29 with NGTD. Repeated again 7/30  -7/29, 7/30 negative thus far.    -Continue Ancef per ID  -if cultures remain negative tomorrow then will plan for PICC placement   -[facility] D/C'd on 7/29  -Getting echo to R/O endocarditis

## 2023-07-31 NOTE — ASSESSMENT & PLAN NOTE
I independently reviewed patient's lab work and images as documented. 39 yo male with LVAD c/b prior MSSA DLESI s/p treatment admitted with MSSA bacteremia 2/2 to DLES. Admit blcx and repeat DLES cx with MSSA. Also found to have COVID, CXR with L >R opacity; s/p remdesivir, on dexamethasone, suspect leukocytosis 2/2 to steroids. Denies new joint/back pain or diarrhea. S/p line removed on 7/29, repeat blcx post-line removal ngtd.     Recommendations:  -continue ancef 8g CI IV daily  -f/u TTE  -if blood cx from 7/30 are negative tomorrow, ok for PICC placement  -continue dexamethasone and isolation precautions per hospital protocol

## 2023-07-31 NOTE — PROGRESS NOTES
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 37 yo male with LVAD c/b prior MSSA DLESI s/p treatment admitted with MSSA bacteremia 2/2 to DLES. Admit blcx and repeat DLES cx with MSSA. Also found to have COVID, CXR with L >R opacity; s/p remdesivir, on dexamethasone, suspect leukocytosis 2/2 to steroids. Denies new joint/back pain or diarrhea. S/p line removed on 7/29, repeat blcx post-line removal ngtd.     Recommendations:  -continue ancef 8g CI IV daily  -f/u TTE  -if blood cx from 7/30 are negative tomorrow, ok for PICC placement  -continue dexamethasone and isolation precautions per hospital protocol   -completed last dose today          Thank you for your consult. I will follow-up with patient. Please contact us if you have any additional questions. Above d/w primary team.       50 minutes of total time spent on the encounter, which includes face to face time and non-face to face time preparing to see the patient (eg, review of tests), obtaining and/or reviewing separately obtained history, documenting clinical information in the electronic or other health record, independently interpreting results (not separately reported) and communicating results to the patient/family/caregiver, or care coordination (not separately reported).       Laura Baldwin MD  Infectious Disease  [facility]    Subjective:     Principal Problem:Sepsis    HPI: 38 year old male with a history of CHF s/p LVAD placement in June of 2022.  He was recently admitted to the hospital in June of  2023 with COVID-19 infection and MSSA infection of his LVAD drive line exit site.  He was apparently on room air for most of that hospital stay.  He received 3 days of remdesivir.  He was discharged on oral doxycycline to complete a 14 day course for the MSSA drive line infection.  He had tested negative for COVID-19 following his treatment.  He is re-admitted with complaints of shortness of breath, chest pains and generalized ill feeling. COVID-19 testing is positive again.  Chest x-ray shows diffuse infiltrates bilaterally.  ID is consulted to assist with his management.      Interval History:  No issues. Pt reports tolerating abx without adverse reaction. Old line removed over the weekend with subsequent blood cx ngtd. TTE pending. Denies new joint/back pain.       Review of Systems  Objective:     Vital Signs (Most Recent):  Temp: 97.7 °F (36.5 °C) (07/31/23 0750)  Pulse: (!) 116 (07/31/23 1000)  Resp: 18 (07/31/23 0849)  BP: 94/62 (07/31/23 0752)  SpO2: 97 % (07/31/23 0750) Vital Signs (24h Range):  Temp:  [97.7 °F (36.5 °C)-98.4 °F (36.9 °C)] 97.7 °F (36.5 °C)  Pulse:  [] 116  Resp:  [18-20] 18  SpO2:  [96 %-99 %] 97 %  BP: ()/(0-78) 94/62     Weight: 90.6 kg (199 lb 11.8 oz)  Body mass index is 24.97 kg/m².    Estimated Creatinine Clearance: 85.5 mL/min (based on SCr of 1.4 mg/dL).     Physical Exam  Constitutional:       General: He is not in acute distress.     Appearance: He is not ill-appearing or toxic-appearing.   HENT:      Head: Normocephalic and atraumatic.      Right Ear: External ear normal.      Left Ear: External ear normal.   Eyes:      General: No scleral icterus.        Right eye: No discharge.         Left eye: No discharge.   Cardiovascular:      Comments: VAD  Pulmonary:      Effort: Pulmonary effort is normal. No respiratory distress.   Abdominal:      General: There is no distension.      Palpations: Abdomen is soft.      Tenderness: There is no abdominal tenderness. There is  no guarding.      Comments: DLES dressed - c/d/i   Musculoskeletal:         General: No swelling or tenderness.      Right lower leg: No edema.      Left lower leg: No edema.   Skin:     General: Skin is warm and dry.      Findings: No erythema.   Neurological:      Mental Status: He is alert and oriented to person, place, and time.          Significant Labs:   Microbiology Results (last 7 days)       Procedure Component Value Units Date/Time    Blood culture [[id number]]  (Abnormal)  (Susceptibility) Collected: 07/28/23 0939    Order Status: Completed Specimen: Blood from Antecubital, Left Arm Updated: 07/31/23 1026     Blood Culture, Routine Gram stain aer bottle: Gram positive cocci in clusters resembling Staph      Positive results previously called 07/29/2023  21:20      STAPHYLOCOCCUS AUREUS  ID consult required at [facility].      Blood culture [[id number]] Collected: 07/30/23 0810    Order Status: Completed Specimen: Blood Updated: 07/31/23 1012     Blood Culture, Routine No Growth to date      No Growth to date    Blood culture [[id number]] Collected: 07/30/23 0810    Order Status: Completed Specimen: Blood Updated: 07/31/23 1012     Blood Culture, Routine No Growth to date      No Growth to date    Blood culture [[id number]]  (Abnormal) Collected: 07/28/23 0940    Order Status: Completed Specimen: Blood from Peripheral, Left Hand Updated: 07/31/23 0941     Blood Culture, Routine Gram stain aer bottle: Gram positive cocci in clusters resembling Staph      Positive results previously called 07/30/2023  13:09      STAPHYLOCOCCUS AUREUS  ID consult required at [facility].  For susceptibility see order #[id number]      Blood culture [[id number]] Collected: 07/29/23 1130    Order Status: Completed Specimen: Blood Updated: 07/30/23 1412     Blood Culture, Routine No Growth to date      No Growth to date    Narrative:      Please obtain 2 sets after RIJ is  pulled, thank you    Blood culture [[id number]] Collected: 07/29/23 1132    Order Status: Completed Specimen: Blood Updated: 07/30/23 1412     Blood Culture, Routine No Growth to date      No Growth to date    Narrative:      Please obtain 2 sets after RIJ is pulled, thank you  Rt hand    Blood culture [[id number]] Collected: 07/25/23 0450    Order Status: Completed Specimen: Blood Updated: 07/30/23 0612     Blood Culture, Routine No growth after 5 days.    Blood culture [[id number]]  (Abnormal)  (Susceptibility) Collected: 07/25/23 0451    Order Status: Completed Specimen: Blood Updated: 07/29/23 1045     Blood Culture, Routine Gram stain aer bottle: Gram positive cocci in clusters resembling Staph      Results called to and read back by:Laura Kilgore RN 07/27/2023  13:01      STAPHYLOCOCCUS AUREUS  ID consult required at [facility].      MRSA/SA Rapid ID by PCR from Blood culture [[id number]]  (Abnormal) Collected: 07/25/23 0451    Order Status: Completed Updated: 07/27/23 1438     Staph aureus ID by PCR Positive     Methicillin Resistant ID by PCR Negative    Blood culture [[id number]]  (Abnormal)  (Susceptibility) Collected: 07/23/23 1305    Order Status: Completed Specimen: Blood from Line, Jugular, Internal Right Updated: 07/26/23 1120     Blood Culture, Routine Gram stain aer bottle: Gram positive cocci in clusters resembling Staph      Results called to and read back by:Cong Urena RN 07/24/2023  16:05      STAPHYLOCOCCUS AUREUS  ID consult required at [facility].      Culture, Anaerobe [[id number]] Collected: 07/22/23 0159    Order Status: Completed Specimen: Abscess from Abdomen Updated: 07/25/23 1134     Anaerobic Culture No anaerobes isolated    Blood culture [[id number]]  (Abnormal)  (Susceptibility) Collected: 07/22/23 1058    Order Status: Completed Specimen: Blood from Peripheral, Hand, Right Updated: 07/25/23 1014     Blood Culture, Routine Gram  stain aer bottle: Gram positive cocci in clusters resembling Staph      Results called to and read back by:Zaheer Rae RN 07/23/2023  06:41      STAPHYLOCOCCUS AUREUS  ID consult required at [facility].      Aerobic culture [[id number]]  (Abnormal)  (Susceptibility) Collected: 07/22/23 0159    Order Status: Completed Specimen: Abscess from Abdomen Updated: 07/24/23 1113     Aerobic Bacterial Culture STAPHYLOCOCCUS AUREUS  Few              Significant Imaging: I have reviewed all pertinent imaging results/findings within the past 24 hours.

## 2023-07-31 NOTE — PLAN OF CARE
Patient Aox4, RA SR VS stable. Doppler WNL. Patient Independent with ADLs. Patient reports no pain, no headache. Continue to monitor.      Problem: Adult Inpatient Plan of Care  Goal: Plan of Care Review  Outcome: Ongoing, Progressing  Goal: Patient-Specific Goal (Individualized)  Outcome: Ongoing, Progressing  Goal: Absence of Hospital-Acquired Illness or Injury  Outcome: Ongoing, Progressing  Goal: Optimal Comfort and Wellbeing  Outcome: Ongoing, Progressing  Goal: Readiness for Transition of Care  Outcome: Ongoing, Progressing     Problem: Diabetes Comorbidity  Goal: Blood Glucose Level Within Targeted Range  Outcome: Ongoing, Progressing     Problem: Infection  Goal: Absence of Infection Signs and Symptoms  Outcome: Ongoing, Progressing     Problem: Adjustment to Illness (Sepsis/Septic Shock)  Goal: Optimal Coping  Outcome: Ongoing, Progressing     Problem: Bleeding (Sepsis/Septic Shock)  Goal: Absence of Bleeding  Outcome: Ongoing, Progressing     Problem: Glycemic Control Impaired (Sepsis/Septic Shock)  Goal: Blood Glucose Level Within Desired Range  Outcome: Ongoing, Progressing     Problem: Infection Progression (Sepsis/Septic Shock)  Goal: Absence of Infection Signs and Symptoms  Outcome: Ongoing, Progressing     Problem: Nutrition Impaired (Sepsis/Septic Shock)  Goal: Optimal Nutrition Intake  Outcome: Ongoing, Progressing     Problem: Fluid Imbalance (Pneumonia)  Goal: Fluid Balance  Outcome: Ongoing, Progressing     Problem: Infection (Pneumonia)  Goal: Resolution of Infection Signs and Symptoms  Outcome: Ongoing, Progressing     Problem: Respiratory Compromise (Pneumonia)  Goal: Effective Oxygenation and Ventilation  Outcome: Ongoing, Progressing     Problem: Fall Injury Risk  Goal: Absence of Fall and Fall-Related Injury  Outcome: Ongoing, Progressing     Problem: Hemodynamic Instability (Ventricular Assist Device)  Goal: Optimal Blood Flow  Outcome: Ongoing, Progressing     Problem: Infection  (Ventricular Assist Device)  Goal: Absence of Infection Signs and Symptoms  Outcome: Ongoing, Progressing     Problem: Right-Sided Heart Compromise (Ventricular Assist Device)  Goal: Effective Right-Sided Heart Function  Outcome: Ongoing, Progressing

## 2023-07-31 NOTE — SUBJECTIVE & OBJECTIVE
"Interval HPI:   Overnight events: No acute events overnight. Patient in room 314/314 A. Blood glucose stable. BG above goal on current insulin regimen (SSI, prandial, and basal insulin ). Steroid use- Dexamethasone  6 mg (dose 10/10).    Renal function- Normal   Vasopressors-  None       Endocrine will continue to follow and manage insulin orders inpatient.         Diet diabetic [facility]; 2000 Calorie; Double Portions; Fluid - 2000mL     Eatin%  Nausea: No  Hypoglycemia and intervention: No  Fever: No  TPN and/or TF: No      BP 94/62 (BP Location: Left arm, Patient Position: Lying)   Pulse 96   Temp 97.7 °F (36.5 °C) (Oral)   Resp 18   Ht 6' 3" (1.905 m)   Wt 90.6 kg (199 lb 11.8 oz)   SpO2 97%   BMI 24.97 kg/m²     Labs Reviewed and Include    Recent Labs   Lab 23  0501   *   CALCIUM 9.9   ALBUMIN 2.5*   PROT 8.1   *   K 4.5   CO2 25   CL 97   BUN 36*   CREATININE 1.4   ALKPHOS 150*   ALT 32   AST 39   BILITOT 0.7     Lab Results   Component Value Date    WBC 35.42 (H) 2023    HGB 11.7 (L) 2023    HCT 36.1 (L) 2023    MCV 84 2023     (H) 2023     No results for input(s): "TSH", "FREET4" in the last 168 hours.  Lab Results   Component Value Date    HGBA1C 6.7 (H) 2023       Nutritional status:   Body mass index is 24.97 kg/m².  Lab Results   Component Value Date    ALBUMIN 2.5 (L) 2023    ALBUMIN 2.2 (L) 2023    ALBUMIN 2.2 (L) 2023     Lab Results   Component Value Date    PREALBUMIN 26 2023    PREALBUMIN 18 (L) 2023    PREALBUMIN 14 (L) 2023       Estimated Creatinine Clearance: 85.5 mL/min (based on SCr of 1.4 mg/dL).    Accu-Checks  Recent Labs     23  1620 23  0155 23  0800 23  1158 23  1603 23  1934 23  2040 23  2346 23  0757   POCTGLUCOSE 337* 239* 313* 261* 216* 171* 272* 193* 285* 327*       Current Medications and/or " Treatments Impacting Glycemic Control  Immunotherapy:    Immunosuppressants       None          Steroids:   Hormones (From admission, onward)      None          Pressors:    Autonomic Drugs (From admission, onward)      None          Hyperglycemia/Diabetes Medications:   Antihyperglycemics (From admission, onward)      Start     Stop Route Frequency Ordered    07/31/23 2100  insulin detemir U-100 (Levemir) pen 33 Units         -- SubQ 2 times daily 07/31/23 0925    07/31/23 1130  insulin aspart U-100 pen 36 Units         -- SubQ 3 times daily with meals 07/31/23 0925    07/30/23 0907  insulin aspart U-100 pen 0-15 Units         -- SubQ Before meals, nightly and at 0200 PRN 07/30/23 0807

## 2023-07-31 NOTE — PROGRESS NOTES
[facility] - Cardiology Stepdown  Heart Transplant  Progress Note    Patient Name: Kevan Queen  MRN: [id number]  Admission Date: 7/22/2023  Hospital Length of Stay: 9 days  Attending Physician: Marlo Marques MD  Primary Care Provider: ORALIA Cline  Principal Problem:Sepsis    Subjective:     **Interval History: No complaints or overnight events. Cultures from 7/29, 7/30 are negative thus far.  Per ID: if remain negative tomorrow then can place PICC.  Continue Ancef daily.  Last dose of steroids today.  Echo ordered and pending.  Patient is ent negative 1.1L in the last 24 hours and compensated on exam.    Continuous Infusions:   milrinone 20mg/100ml D5W (200mcg/ml) 0.25 mcg/kg/min (07/31/23 0856)     Scheduled Meds:   aspirin  81 mg Oral Daily    busPIRone  10 mg Oral BID    ceFAZolin (ANCEF) 8 g in dextrose 5 % (D5W) 500 mL CONTINUOUS INFUSION  8 g Intravenous Q24H    furosemide  80 mg Oral Daily    insulin aspart U-100  36 Units Subcutaneous TIDWM    insulin detemir U-100  33 Units Subcutaneous BID    levETIRAcetam  1,000 mg Oral BID    mirtazapine  15 mg Oral Nightly    mupirocin   Nasal BID    polyethylene glycol  17 g Oral Daily    senna-docusate 8.6-50 mg  2 tablet Oral BID    valsartan  40 mg Oral BID     PRN Meds:acetaminophen, dextrose 10%, dextrose 10%, glucagon (human recombinant), glucose, glucose, HYDROcodone-acetaminophen, insulin aspart U-100    Review of patient's allergies indicates:   Allergen Reactions    Bumex [bumetanide] Hives    Torsemide Hives     Objective:     Vital Signs (Most Recent):  Temp: 99 °F (37.2 °C) (07/31/23 1126)  Pulse: (!) 59 (07/31/23 1126)  Resp: 19 (07/31/23 1126)  BP: 96/69 (07/31/23 1126)  SpO2: 100 % (07/31/23 1126) Vital Signs (24h Range):  Temp:  [97.7 °F (36.5 °C)-99 °F (37.2 °C)] 99 °F (37.2 °C)  Pulse:  [] 59  Resp:  [18-20] 19  SpO2:  [96 %-100 %] 100 %  BP: ()/(0-78) 96/69     Patient Vitals for the past 72 hrs (Last 3 readings):    Weight   07/31/23 0750 90.6 kg (199 lb 11.8 oz)   07/31/23 0700 91.1 kg (200 lb 13.4 oz)   07/30/23 0750 90.8 kg (200 lb 2.8 oz)       Body mass index is 24.97 kg/m².      Intake/Output Summary (Last 24 hours) at 7/31/2023 1143  Last data filed at 7/31/2023 0814  Gross per 24 hour   Intake 564 ml   Output 1600 ml   Net -1036 ml         Hemodynamic Parameters:       Telemetry: ST       Physical Exam  Constitutional:       Appearance: Normal appearance.   HENT:      Head: Normocephalic and atraumatic.   Eyes:      Conjunctiva/sclera: Conjunctivae normal.      Pupils: Pupils are equal, round, and reactive to light.   Neck:      Comments: Do not appreciate elevated neck veins  Cardiovascular:      Rate and Rhythm: Regular rhythm. Tachycardia present.      Comments: Smooth VAD hum  Pulmonary:      Effort: Pulmonary effort is normal.      Breath sounds: Normal breath sounds.   Abdominal:      General: Bowel sounds are normal.      Palpations: Abdomen is soft.   Musculoskeletal:         General: No swelling. Normal range of motion.      Cervical back: Normal range of motion and neck supple.   Skin:     General: Skin is warm and dry.      Capillary Refill: Capillary refill takes 2 to 3 seconds.   Neurological:      General: No focal deficit present.      Mental Status: He is alert and oriented to person, place, and time.   Psychiatric:         Mood and Affect: Mood normal.         Behavior: Behavior normal.         Thought Content: Thought content normal.         Judgment: Judgment normal.            Significant Labs:  CBC:  Recent Labs   Lab 07/29/23  0741 07/30/23  0545 07/31/23  0501   WBC 32.05* 32.50* 35.42*   RBC 3.58* 3.67* 4.32*   HGB 9.8* 10.0* 11.7*   HCT 29.1* 29.9* 36.1*   * 527* 564*   MCV 81* 82 84   MCH 27.4 27.2 27.1   MCHC 33.7 33.4 32.4       BNP:  Recent Labs   Lab 07/26/23  0422 07/28/23  0431 07/31/23  0501   * 337* 159*       CMP:  Recent Labs   Lab 07/28/23  0431 07/29/23  0443  07/30/23  0545 07/31/23  0501   * 317* 298* 307*   CALCIUM 9.4 9.0 9.3 9.9   ALBUMIN 2.2*  --  2.2* 2.5*   PROT 6.7  --  7.2 8.1   * 133* 132* 132*   K 3.9 4.5 4.8 4.5   CO2 28 26 22* 25   CL 97 99 101 97   BUN 29* 23* 27* 36*   CREATININE 1.2 1.2 1.2 1.4   ALKPHOS 121  --  135 150*   ALT 30  --  32 32   AST 32  --  47* 39   BILITOT 0.7  --  0.6 0.7        Coagulation:   Recent Labs   Lab 07/29/23 0443 07/30/23  0545 07/31/23  0501   INR 2.0* 2.6* 3.4*   APTT 31.6 33.8* 33.8*       LDH:  Recent Labs   Lab 07/29/23 0443 07/30/23  0545 07/31/23  0501   * 407* 477*       Microbiology:  Microbiology Results (last 7 days)       Procedure Component Value Units Date/Time    Blood culture [[id number]]  (Abnormal)  (Susceptibility) Collected: 07/28/23 0939    Order Status: Completed Specimen: Blood from Antecubital, Left Arm Updated: 07/31/23 1026     Blood Culture, Routine Gram stain aer bottle: Gram positive cocci in clusters resembling Staph      Positive results previously called 07/29/2023  21:20      STAPHYLOCOCCUS AUREUS  ID consult required at [facility].Critical access hospital,[place] and [facility] locations.      Blood culture [[id number]] Collected: 07/30/23 0810    Order Status: Completed Specimen: Blood Updated: 07/31/23 1012     Blood Culture, Routine No Growth to date      No Growth to date    Blood culture [[id number]] Collected: 07/30/23 0810    Order Status: Completed Specimen: Blood Updated: 07/31/23 1012     Blood Culture, Routine No Growth to date      No Growth to date    Blood culture [[id number]]  (Abnormal) Collected: 07/28/23 0940    Order Status: Completed Specimen: Blood from Peripheral, Left Hand Updated: 07/31/23 0941     Blood Culture, Routine Gram stain aer bottle: Gram positive cocci in clusters resembling Staph      Positive results previously called 07/30/2023  13:09      STAPHYLOCOCCUS AUREUS  ID consult required at [facility] and [facility].  For susceptibility see order  #[id number]      Blood culture [[id number]] Collected: 07/29/23 1130    Order Status: Completed Specimen: Blood Updated: 07/30/23 1412     Blood Culture, Routine No Growth to date      No Growth to date    Narrative:      Please obtain 2 sets after RIJ is pulled, thank you    Blood culture [[id number]] Collected: 07/29/23 1132    Order Status: Completed Specimen: Blood Updated: 07/30/23 1412     Blood Culture, Routine No Growth to date      No Growth to date    Narrative:      Please obtain 2 sets after RIJ is pulled, thank you  Rt hand    Blood culture [[id number]] Collected: 07/25/23 0450    Order Status: Completed Specimen: Blood Updated: 07/30/23 0612     Blood Culture, Routine No growth after 5 days.    Blood culture [[id number]]  (Abnormal)  (Susceptibility) Collected: 07/25/23 0451    Order Status: Completed Specimen: Blood Updated: 07/29/23 1045     Blood Culture, Routine Gram stain aer bottle: Gram positive cocci in clusters resembling Staph      Results called to and read back by:Laura Kilgore RN 07/27/2023  13:01      STAPHYLOCOCCUS AUREUS  [facility].      MRSA/SA Rapid ID by PCR from Blood culture [[id number]]  (Abnormal) Collected: 07/25/23 0451    Order Status: Completed Updated: 07/27/23 1438     Staph aureus ID by PCR Positive     Methicillin Resistant ID by PCR Negative    Blood culture [[id number]]  (Abnormal)  (Susceptibility) Collected: 07/23/23 1305    Order Status: Completed Specimen: Blood from Line, Jugular, Internal Right Updated: 07/26/23 1120     Blood Culture, Routine Gram stain aer bottle: Gram positive cocci in clusters resembling Staph      Results called to and read back by:Cong Urena RN 07/24/2023  16:05      STAPHYLOCOCCUS AUREUS  ID consult required at [facility].      Culture, Anaerobe [[id number]] Collected: 07/22/23 0159    Order Status: Completed Specimen: Abscess from Abdomen Updated: 07/25/23 1134      Anaerobic Culture No anaerobes isolated    Blood culture [[id number]]  (Abnormal)  (Susceptibility) Collected: 07/22/23 1058    Order Status: Completed Specimen: Blood from Peripheral, Hand, Right Updated: 07/25/23 1014     Blood Culture, Routine Gram stain aer bottle: Gram positive cocci in clusters resembling Staph      Results called to and read back by:Zaheer Rae RN 07/23/2023  06:41      STAPHYLOCOCCUS AUREUS  ID consult required at [facility] and [facility].              I have reviewed all pertinent labs within the past 24 hours.    Estimated Creatinine Clearance: 85.5 mL/min (based on SCr of 1.4 mg/dL).    Diagnostic Results:  I have reviewed and interpreted all pertinent imaging results/findings within the past 24 hours.    Assessment and Plan:     Patient is a 36 yo black male with stage d HFrEF, NICM, ? 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 He underwent RVAD removal and chest closure 6/30/2022.  He was weaned off  but he had to restarted due to RVF. He was eventually transitioned to milrinone (secondary to  shortage) now on 0.25 mcg/kg/min.  Patient also has history of driveline infection.  Patient presented to the emergency room today because of pleuritic chest pain that has been going on for the last 3 days however it was more intense today.  Also complains of having shortness of breath and fevers associated with it.  He was spiking fever of 102 in the emergency room with elevated white blood cell count up to 17,000. He was treated with Zosyn.  He was sent here for admission.  Patient was admitted a month back for COVID infection for which he received remdesivir for 3 days.  He is on doxycycline for chronic driveline infection.  He is on Milrinone for RV failure.  Patient denies having any low flow alarms on  the pump.  Also denies having any lower extremity edema, increased discharge from his driveline site.  Patient has ground-glass opacities on imaging from outside hospital      * Sepsis  -Patient presents with elevated fevers, white blood cell count, elevated lactic acid.   -Possible Secondary to COVID-19 infection.  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 Repeated 7/29 with NGTD. Repeated again 7/30  -7/29, 7/30 negative thus far.    -Continue Ancef per ID  -if cultures remain negative tomorrow then will plan for PICC placement   -LINDSAY TLC D/C'd on 7/29  -Getting echo to R/O endocarditis    Bacteremia due to Staphylococcus  -See sepsis    LVAD (left ventricular assist device) present  -HeartMate 3 Implanted on 6/29/2022 as DT with RV failure on milrinone at 0.25 mcg/kg/min.  -Continue Coumadin, Goal INR 2.0-3.0. Supratherapetuic at 3.4   -LDH is stable.  Will continue to monitor daily.  -Speed set at 5100, LSL 4700 rpm  -Getting TTE to R/O endocarditis given persistent MSSA bacteremia    Procedure: Device Interrogation Including analysis of device parameters  Current Settings: Ventricular Assist Device  Review of device function is stable    TXP LVAD INTERROGATIONS 7/31/2023 7/31/2023 7/30/2023 7/30/2023 7/30/2023 7/30/2023 7/30/2023   Type HeartMate3 HeartMate3 HeartMate3 HeartMate3 HeartMate3 HeartMate3 HeartMate3   Flow 4.6 4.4 4.2 4.4 4.9 4.5 3.9   Speed 5100 5100 5100 5100 5100 5100 5100   PI 4.4 3.8 3.9 4.7 3.8 4.7 6.4   Power (Serna) 3.7 3.4 3.7 3.2 3.7 3.7 3.8   LSL 4700 4700 4700 4700 4700 4700 4700   Low Flow Alarm - - - - - - -   High Power Alarm - - - - - - -   Pulsatility Pulse Pulse Pulse Pulse Pulse Pulse Pulse       Chronic combined systolic and diastolic heart failure  -NICM  -Last 2D Echo 10/31/22: LVEF 10%, LVEDD 7.25 cm  -Euvolemic on examination today  -Current  diuretic regimen:Lasix 40mg Qdaily  -Status post LVAD.  -GDMT: On Valsartan, Aldactone and Toprol at home. Valsartan 40 mg po bid resumed. K+ has trended up at 4.8 - D/C'd KCL 40 mEq bid. 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  Now with new gram + bacteremia  -On Cefaxolin   -ID following     Type 2 diabetes mellitus with hyperglycemia  -Will keep him on sliding scale and a.c. HS    Seizure-like activity  -On on DEYA Irizarry  Heart Transplant  [facility] - Cardiology Stepdown

## 2023-07-31 NOTE — SUBJECTIVE & OBJECTIVE
**Interval History: No complaints or overnight events. Cultures from 7/29, 7/30 are negative thus far.  Per ID: if remain negative tomorrow then can place PICC.  Continue Ancef daily.  Last dose of steroids today.  Echo ordered and pending.  Patient is ent negative 1.1L in the last 24 hours and compensated on exam.    Continuous Infusions:   milrinone 20mg/100ml D5W (200mcg/ml) 0.25 mcg/kg/min (07/31/23 0856)     Scheduled Meds:   aspirin  81 mg Oral Daily    busPIRone  10 mg Oral BID    ceFAZolin (ANCEF) 8 g in dextrose 5 % (D5W) 500 mL CONTINUOUS INFUSION  8 g Intravenous Q24H    furosemide  80 mg Oral Daily    insulin aspart U-100  36 Units Subcutaneous TIDWM    insulin detemir U-100  33 Units Subcutaneous BID    levETIRAcetam  1,000 mg Oral BID    mirtazapine  15 mg Oral Nightly    mupirocin   Nasal BID    polyethylene glycol  17 g Oral Daily    senna-docusate 8.6-50 mg  2 tablet Oral BID    valsartan  40 mg Oral BID     PRN Meds:acetaminophen, dextrose 10%, dextrose 10%, glucagon (human recombinant), glucose, glucose, HYDROcodone-acetaminophen, insulin aspart U-100    Review of patient's allergies indicates:   Allergen Reactions    Bumex [bumetanide] Hives    Torsemide Hives     Objective:     Vital Signs (Most Recent):  Temp: 99 °F (37.2 °C) (07/31/23 1126)  Pulse: (!) 59 (07/31/23 1126)  Resp: 19 (07/31/23 1126)  BP: 96/69 (07/31/23 1126)  SpO2: 100 % (07/31/23 1126) Vital Signs (24h Range):  Temp:  [97.7 °F (36.5 °C)-99 °F (37.2 °C)] 99 °F (37.2 °C)  Pulse:  [] 59  Resp:  [18-20] 19  SpO2:  [96 %-100 %] 100 %  BP: ()/(0-78) 96/69     Patient Vitals for the past 72 hrs (Last 3 readings):   Weight   07/31/23 0750 90.6 kg (199 lb 11.8 oz)   07/31/23 0700 91.1 kg (200 lb 13.4 oz)   07/30/23 0750 90.8 kg (200 lb 2.8 oz)       Body mass index is 24.97 kg/m².      Intake/Output Summary (Last 24 hours) at 7/31/2023 1143  Last data filed at 7/31/2023 0814  Gross per 24 hour   Intake 564 ml   Output 1600  ml   Net -1036 ml         Hemodynamic Parameters:       Telemetry: ST       Physical Exam  Constitutional:       Appearance: Normal appearance.   HENT:      Head: Normocephalic and atraumatic.   Eyes:      Conjunctiva/sclera: Conjunctivae normal.      Pupils: Pupils are equal, round, and reactive to light.   Neck:      Comments: Do not appreciate elevated neck veins  Cardiovascular:      Rate and Rhythm: Regular rhythm. Tachycardia present.      Comments: Smooth VAD hum  Pulmonary:      Effort: Pulmonary effort is normal.      Breath sounds: Normal breath sounds.   Abdominal:      General: Bowel sounds are normal.      Palpations: Abdomen is soft.   Musculoskeletal:         General: No swelling. Normal range of motion.      Cervical back: Normal range of motion and neck supple.   Skin:     General: Skin is warm and dry.      Capillary Refill: Capillary refill takes 2 to 3 seconds.   Neurological:      General: No focal deficit present.      Mental Status: He is alert and oriented to person, place, and time.   Psychiatric:         Mood and Affect: Mood normal.         Behavior: Behavior normal.         Thought Content: Thought content normal.         Judgment: Judgment normal.            Significant Labs:  CBC:  Recent Labs   Lab 07/29/23  0741 07/30/23  0545 07/31/23  0501   WBC 32.05* 32.50* 35.42*   RBC 3.58* 3.67* 4.32*   HGB 9.8* 10.0* 11.7*   HCT 29.1* 29.9* 36.1*   * 527* 564*   MCV 81* 82 84   MCH 27.4 27.2 27.1   MCHC 33.7 33.4 32.4       BNP:  Recent Labs   Lab 07/26/23  0422 07/28/23  0431 07/31/23  0501   * 337* 159*       CMP:  Recent Labs   Lab 07/28/23  0431 07/29/23  0443 07/30/23  0545 07/31/23  0501   * 317* 298* 307*   CALCIUM 9.4 9.0 9.3 9.9   ALBUMIN 2.2*  --  2.2* 2.5*   PROT 6.7  --  7.2 8.1   * 133* 132* 132*   K 3.9 4.5 4.8 4.5   CO2 28 26 22* 25   CL 97 99 101 97   BUN 29* 23* 27* 36*   CREATININE 1.2 1.2 1.2 1.4   ALKPHOS 121  --  135 150*   ALT 30  --  32 32    AST 32  --  47* 39   BILITOT 0.7  --  0.6 0.7        Coagulation:   Recent Labs   Lab 07/29/23  0443 07/30/23  0545 07/31/23  0501   INR 2.0* 2.6* 3.4*   APTT 31.6 33.8* 33.8*       LDH:  Recent Labs   Lab 07/29/23  0443 07/30/23  0545 07/31/23  0501   * 407* 477*       Microbiology:  Microbiology Results (last 7 days)       Procedure Component Value Units Date/Time    Blood culture [[id number]]  (Abnormal)  (Susceptibility) Collected: 07/28/23 0939    Order Status: Completed Specimen: Blood from Antecubital, Left Arm Updated: 07/31/23 1026     Blood Culture, Routine Gram stain aer bottle: Gram positive cocci in clusters resembling Staph      Positive results previously called 07/29/2023  21:20      STAPHYLOCOCCUS AUREUS  ID consult required at [facility] locations.      Blood culture [[id number]] Collected: 07/30/23 0810    Order Status: Completed Specimen: Blood Updated: 07/31/23 1012     Blood Culture, Routine No Growth to date      No Growth to date    Blood culture [[id number]] Collected: 07/30/23 0810    Order Status: Completed Specimen: Blood Updated: 07/31/23 1012     Blood Culture, Routine No Growth to date      No Growth to date    Blood culture [[id number]]  (Abnormal) Collected: 07/28/23 0940    Order Status: Completed Specimen: Blood from Peripheral, Left Hand Updated: 07/31/23 0941     Blood Culture, Routine Gram stain aer bottle: Gram positive cocci in clusters resembling Staph      Positive results previously called 07/30/2023  13:09      STAPHYLOCOCCUS AUREUS  ID consult required at [facility] locations.  For susceptibility see order #[id number]      Blood culture [[id number]] Collected: 07/29/23 1130    Order Status: Completed Specimen: Blood Updated: 07/30/23 1412     Blood Culture, Routine No Growth to date      No Growth to date    Narrative:      Please obtain 2 sets after RIJ is pulled, thank you    Blood culture [[id number]] Collected: 07/29/23  1132    Order Status: Completed Specimen: Blood Updated: 07/30/23 1412     Blood Culture, Routine No Growth to date      No Growth to date    Narrative:      Please obtain 2 sets after RIJ is pulled, thank you  Rt hand    Blood culture [[id number]] Collected: 07/25/23 0450    Order Status: Completed Specimen: Blood Updated: 07/30/23 0612     Blood Culture, Routine No growth after 5 days.    Blood culture [[id number]]  (Abnormal)  (Susceptibility) Collected: 07/25/23 0451    Order Status: Completed Specimen: Blood Updated: 07/29/23 1045     Blood Culture, Routine Gram stain aer bottle: Gram positive cocci in clusters resembling Staph      Results called to and read back by:Laura Kilgore RN 07/27/2023  13:01      STAPHYLOCOCCUS AUREUS  ID consult required at [facility] and [facility].      MRSA/SA Rapid ID by PCR from Blood culture [[id number]]  (Abnormal) Collected: 07/25/23 0451    Order Status: Completed Updated: 07/27/23 1438     Staph aureus ID by PCR Positive     Methicillin Resistant ID by PCR Negative    Blood culture [[id number]]  (Abnormal)  (Susceptibility) Collected: 07/23/23 1305    Order Status: Completed Specimen: Blood from Line, Jugular, Internal Right Updated: 07/26/23 1120     Blood Culture, Routine Gram stain aer bottle: Gram positive cocci in clusters resembling Staph      Results called to and read back by:Cong Urena RN 07/24/2023  16:05      STAPHYLOCOCCUS AUREUS  ID consult required at [facility] and [facility].      Culture, Anaerobe [[id number]] Collected: 07/22/23 0159    Order Status: Completed Specimen: Abscess from Abdomen Updated: 07/25/23 1134     Anaerobic Culture No anaerobes isolated    Blood culture [[id number]]  (Abnormal)  (Susceptibility) Collected: 07/22/23 1058    Order Status: Completed Specimen: Blood from Peripheral, Hand, Right Updated: 07/25/23 1014     Blood Culture, Routine Gram stain aer bottle: Gram positive cocci in clusters resembling Staph       Results called to and read back by:Zaheer Rae RN 07/23/2023  06:41      STAPHYLOCOCCUS AUREUS  ID consult required at [facility] and [facility].              I have reviewed all pertinent labs within the past 24 hours.    Estimated Creatinine Clearance: 85.5 mL/min (based on SCr of 1.4 mg/dL).    Diagnostic Results:  I have reviewed and interpreted all pertinent imaging results/findings within the past 24 hours.

## 2023-08-01 ENCOUNTER — TELEPHONE (OUTPATIENT)
Dept: TRANSPLANT | Facility: CLINIC | Age: 38
End: 2023-08-01
Payer: MEDICAID

## 2023-08-01 LAB
ALBUMIN SERPL BCP-MCNC: 2.4 G/DL (ref 3.5–5.2)
ALP SERPL-CCNC: 151 U/L (ref 55–135)
ALT SERPL W/O P-5'-P-CCNC: 31 U/L (ref 10–44)
ANION GAP SERPL CALC-SCNC: 10 MMOL/L (ref 8–16)
ANISOCYTOSIS BLD QL SMEAR: SLIGHT
AST SERPL-CCNC: 55 U/L (ref 10–40)
BACTERIA BLD CULT: ABNORMAL
BASOPHILS # BLD AUTO: 0.06 K/UL (ref 0–0.2)
BASOPHILS NFR BLD: 0.2 % (ref 0–1.9)
BILIRUB SERPL-MCNC: 0.5 MG/DL (ref 0.1–1)
BUN SERPL-MCNC: 40 MG/DL (ref 6–20)
BURR CELLS BLD QL SMEAR: ABNORMAL
CALCIUM SERPL-MCNC: 9.5 MG/DL (ref 8.7–10.5)
CHLORIDE SERPL-SCNC: 100 MMOL/L (ref 95–110)
CHOLEST SERPL-MCNC: 148 MG/DL (ref 120–199)
CHOLEST/HDLC SERPL: 2.9 {RATIO} (ref 2–5)
CO2 SERPL-SCNC: 22 MMOL/L (ref 23–29)
CREAT SERPL-MCNC: 1.3 MG/DL (ref 0.5–1.4)
DIFFERENTIAL METHOD: ABNORMAL
EOSINOPHIL # BLD AUTO: 0 K/UL (ref 0–0.5)
EOSINOPHIL NFR BLD: 0.1 % (ref 0–8)
ERYTHROCYTE [DISTWIDTH] IN BLOOD BY AUTOMATED COUNT: 24.3 % (ref 11.5–14.5)
EST. GFR  (NO RACE VARIABLE): >60 ML/MIN/1.73 M^2
GLUCOSE SERPL-MCNC: 328 MG/DL (ref 70–110)
HCT VFR BLD AUTO: 32.8 % (ref 40–54)
HDLC SERPL-MCNC: 51 MG/DL (ref 40–75)
HDLC SERPL: 34.5 % (ref 20–50)
HGB BLD-MCNC: 10.9 G/DL (ref 14–18)
IMM GRANULOCYTES # BLD AUTO: 0.94 K/UL (ref 0–0.04)
IMM GRANULOCYTES NFR BLD AUTO: 2.7 % (ref 0–0.5)
INR PPP: 2.5 (ref 0.8–1.2)
LDH SERPL L TO P-CCNC: 410 U/L (ref 110–260)
LDLC SERPL CALC-MCNC: 66.2 MG/DL (ref 63–159)
LYMPHOCYTES # BLD AUTO: 2.7 K/UL (ref 1–4.8)
LYMPHOCYTES NFR BLD: 7.8 % (ref 18–48)
MAGNESIUM SERPL-MCNC: 2 MG/DL (ref 1.6–2.6)
MCH RBC QN AUTO: 27.5 PG (ref 27–31)
MCHC RBC AUTO-ENTMCNC: 33.2 G/DL (ref 32–36)
MCV RBC AUTO: 83 FL (ref 82–98)
MONOCYTES # BLD AUTO: 2 K/UL (ref 0.3–1)
MONOCYTES NFR BLD: 5.6 % (ref 4–15)
NEUTROPHILS # BLD AUTO: 29.1 K/UL (ref 1.8–7.7)
NEUTROPHILS NFR BLD: 83.6 % (ref 38–73)
NONHDLC SERPL-MCNC: 97 MG/DL
NRBC BLD-RTO: 0 /100 WBC
OVALOCYTES BLD QL SMEAR: ABNORMAL
PHOSPHATE SERPL-MCNC: 3.3 MG/DL (ref 2.7–4.5)
PLATELET # BLD AUTO: 510 K/UL (ref 150–450)
PLATELET BLD QL SMEAR: ABNORMAL
PMV BLD AUTO: 9.1 FL (ref 9.2–12.9)
POCT GLUCOSE: 123 MG/DL (ref 70–110)
POCT GLUCOSE: 176 MG/DL (ref 70–110)
POCT GLUCOSE: 222 MG/DL (ref 70–110)
POCT GLUCOSE: 230 MG/DL (ref 70–110)
POCT GLUCOSE: 309 MG/DL (ref 70–110)
POCT GLUCOSE: >500 MG/DL (ref 70–110)
POLYCHROMASIA BLD QL SMEAR: ABNORMAL
POTASSIUM SERPL-SCNC: 4.7 MMOL/L (ref 3.5–5.1)
PROT SERPL-MCNC: 7.5 G/DL (ref 6–8.4)
PROTHROMBIN TIME: 25.1 SEC (ref 9–12.5)
RBC # BLD AUTO: 3.97 M/UL (ref 4.6–6.2)
SCHISTOCYTES BLD QL SMEAR: ABNORMAL
SODIUM SERPL-SCNC: 132 MMOL/L (ref 136–145)
SPHEROCYTES BLD QL SMEAR: ABNORMAL
TARGETS BLD QL SMEAR: ABNORMAL
TRIGL SERPL-MCNC: 154 MG/DL (ref 30–150)
WBC # BLD AUTO: 34.78 K/UL (ref 3.9–12.7)

## 2023-08-01 PROCEDURE — C1751 CATH, INF, PER/CENT/MIDLINE: HCPCS

## 2023-08-01 PROCEDURE — 85610 PROTHROMBIN TIME: CPT | Performed by: PHYSICIAN ASSISTANT

## 2023-08-01 PROCEDURE — 80061 LIPID PANEL: CPT | Performed by: INTERNAL MEDICINE

## 2023-08-01 PROCEDURE — 93750 INTERROGATION VAD IN PERSON: CPT | Mod: ,,, | Performed by: INTERNAL MEDICINE

## 2023-08-01 PROCEDURE — 84100 ASSAY OF PHOSPHORUS: CPT | Performed by: HOSPITALIST

## 2023-08-01 PROCEDURE — 20600001 HC STEP DOWN PRIVATE ROOM

## 2023-08-01 PROCEDURE — 27000207 HC ISOLATION

## 2023-08-01 PROCEDURE — 99233 SBSQ HOSP IP/OBS HIGH 50: CPT | Mod: ,,, | Performed by: PHYSICIAN ASSISTANT

## 2023-08-01 PROCEDURE — 94761 N-INVAS EAR/PLS OXIMETRY MLT: CPT

## 2023-08-01 PROCEDURE — 25000003 PHARM REV CODE 250: Performed by: INTERNAL MEDICINE

## 2023-08-01 PROCEDURE — 36573 INSJ PICC RS&I 5 YR+: CPT

## 2023-08-01 PROCEDURE — 36415 COLL VENOUS BLD VENIPUNCTURE: CPT | Performed by: PHYSICIAN ASSISTANT

## 2023-08-01 PROCEDURE — 83615 LACTATE (LD) (LDH) ENZYME: CPT | Performed by: HOSPITALIST

## 2023-08-01 PROCEDURE — A4216 STERILE WATER/SALINE, 10 ML: HCPCS | Performed by: INTERNAL MEDICINE

## 2023-08-01 PROCEDURE — 99232 PR SUBSEQUENT HOSPITAL CARE,LEVL II: ICD-10-PCS | Mod: ,,, | Performed by: NURSE PRACTITIONER

## 2023-08-01 PROCEDURE — 25000003 PHARM REV CODE 250: Performed by: NURSE PRACTITIONER

## 2023-08-01 PROCEDURE — 85025 COMPLETE CBC W/AUTO DIFF WBC: CPT | Performed by: NURSE PRACTITIONER

## 2023-08-01 PROCEDURE — 83735 ASSAY OF MAGNESIUM: CPT | Performed by: HOSPITALIST

## 2023-08-01 PROCEDURE — 25000003 PHARM REV CODE 250: Performed by: HOSPITALIST

## 2023-08-01 PROCEDURE — 99233 SBSQ HOSP IP/OBS HIGH 50: CPT | Mod: ,,, | Performed by: STUDENT IN AN ORGANIZED HEALTH CARE EDUCATION/TRAINING PROGRAM

## 2023-08-01 PROCEDURE — 36415 COLL VENOUS BLD VENIPUNCTURE: CPT | Performed by: HOSPITALIST

## 2023-08-01 PROCEDURE — 63600175 PHARM REV CODE 636 W HCPCS: Performed by: INTERNAL MEDICINE

## 2023-08-01 PROCEDURE — 25500020 PHARM REV CODE 255: Performed by: INTERNAL MEDICINE

## 2023-08-01 PROCEDURE — 99233 PR SUBSEQUENT HOSPITAL CARE,LEVL III: ICD-10-PCS | Mod: ,,, | Performed by: PHYSICIAN ASSISTANT

## 2023-08-01 PROCEDURE — 80053 COMPREHEN METABOLIC PANEL: CPT | Performed by: NURSE PRACTITIONER

## 2023-08-01 PROCEDURE — 63600175 PHARM REV CODE 636 W HCPCS: Performed by: HOSPITALIST

## 2023-08-01 PROCEDURE — 99232 SBSQ HOSP IP/OBS MODERATE 35: CPT | Mod: ,,, | Performed by: NURSE PRACTITIONER

## 2023-08-01 PROCEDURE — 76937 US GUIDE VASCULAR ACCESS: CPT

## 2023-08-01 PROCEDURE — 27000248 HC VAD-ADDITIONAL DAY

## 2023-08-01 PROCEDURE — 93750 PR INTERROGATE VENT ASSIST DEV, IN PERSON, W PHYSICIAN ANALYSIS: ICD-10-PCS | Mod: ,,, | Performed by: INTERNAL MEDICINE

## 2023-08-01 PROCEDURE — 99233 PR SUBSEQUENT HOSPITAL CARE,LEVL III: ICD-10-PCS | Mod: ,,, | Performed by: STUDENT IN AN ORGANIZED HEALTH CARE EDUCATION/TRAINING PROGRAM

## 2023-08-01 RX ORDER — INSULIN ASPART 100 [IU]/ML
26 INJECTION, SOLUTION INTRAVENOUS; SUBCUTANEOUS
Status: DISCONTINUED | OUTPATIENT
Start: 2023-08-01 | End: 2023-08-02

## 2023-08-01 RX ORDER — SODIUM CHLORIDE 0.9 % (FLUSH) 0.9 %
10 SYRINGE (ML) INJECTION
Status: DISCONTINUED | OUTPATIENT
Start: 2023-08-01 | End: 2023-08-09 | Stop reason: HOSPADM

## 2023-08-01 RX ORDER — SODIUM CHLORIDE 0.9 % (FLUSH) 0.9 %
10 SYRINGE (ML) INJECTION EVERY 6 HOURS
Status: DISCONTINUED | OUTPATIENT
Start: 2023-08-01 | End: 2023-08-09 | Stop reason: HOSPADM

## 2023-08-01 RX ADMIN — Medication 10 ML: at 05:08

## 2023-08-01 RX ADMIN — MILRINONE LACTATE 0.25 MCG/KG/MIN: 0.2 INJECTION, SOLUTION INTRAVENOUS at 08:08

## 2023-08-01 RX ADMIN — LEVETIRACETAM 1000 MG: 500 TABLET, FILM COATED ORAL at 08:08

## 2023-08-01 RX ADMIN — BUSPIRONE HYDROCHLORIDE 10 MG: 10 TABLET ORAL at 08:08

## 2023-08-01 RX ADMIN — ACETAMINOPHEN 650 MG: 325 TABLET ORAL at 08:08

## 2023-08-01 RX ADMIN — Medication 10 ML: at 12:08

## 2023-08-01 RX ADMIN — VALSARTAN 40 MG: 40 TABLET, FILM COATED ORAL at 08:08

## 2023-08-01 RX ADMIN — IOHEXOL 100 ML: 350 INJECTION, SOLUTION INTRAVENOUS at 01:08

## 2023-08-01 RX ADMIN — INSULIN ASPART 6 UNITS: 100 INJECTION, SOLUTION INTRAVENOUS; SUBCUTANEOUS at 04:08

## 2023-08-01 RX ADMIN — INSULIN ASPART 36 UNITS: 100 INJECTION, SOLUTION INTRAVENOUS; SUBCUTANEOUS at 08:08

## 2023-08-01 RX ADMIN — CEFAZOLIN 8 G: 2 INJECTION, POWDER, FOR SOLUTION INTRAMUSCULAR; INTRAVENOUS at 10:08

## 2023-08-01 RX ADMIN — ASPIRIN 81 MG: 81 TABLET, COATED ORAL at 08:08

## 2023-08-01 RX ADMIN — MIRTAZAPINE 15 MG: 15 TABLET, FILM COATED ORAL at 08:08

## 2023-08-01 RX ADMIN — HYDROCODONE BITARTRATE AND ACETAMINOPHEN 1 TABLET: 5; 325 TABLET ORAL at 06:08

## 2023-08-01 RX ADMIN — SENNOSIDES AND DOCUSATE SODIUM 2 TABLET: 50; 8.6 TABLET ORAL at 08:08

## 2023-08-01 RX ADMIN — INSULIN ASPART 10 UNITS: 100 INJECTION, SOLUTION INTRAVENOUS; SUBCUTANEOUS at 03:08

## 2023-08-01 RX ADMIN — INSULIN ASPART 6 UNITS: 100 INJECTION, SOLUTION INTRAVENOUS; SUBCUTANEOUS at 08:08

## 2023-08-01 RX ADMIN — HYDROCODONE BITARTRATE AND ACETAMINOPHEN 1 TABLET: 5; 325 TABLET ORAL at 08:08

## 2023-08-01 RX ADMIN — INSULIN ASPART 26 UNITS: 100 INJECTION, SOLUTION INTRAVENOUS; SUBCUTANEOUS at 04:08

## 2023-08-01 RX ADMIN — INSULIN DETEMIR 24 UNITS: 100 INJECTION, SOLUTION SUBCUTANEOUS at 08:08

## 2023-08-01 RX ADMIN — FUROSEMIDE 80 MG: 80 TABLET ORAL at 08:08

## 2023-08-01 RX ADMIN — INSULIN ASPART 2 UNITS: 100 INJECTION, SOLUTION INTRAVENOUS; SUBCUTANEOUS at 08:08

## 2023-08-01 RX ADMIN — MILRINONE LACTATE 0.25 MCG/KG/MIN: 0.2 INJECTION, SOLUTION INTRAVENOUS at 11:08

## 2023-08-01 NOTE — PROGRESS NOTES
[facility] - Cardiology Stepdown  Endocrinology  Progress Note    Admit Date: 7/22/2023     Reason for Consult: Management of T2DM, Hyperglycemia     Surgical Procedure and Date: HM3 implantation 6/23/2022    Diabetes diagnosis year:  2022    Home Diabetes Medications:  Levemir 22 units BID, Novolog 16 units TID with meals in addition to SSI (151-200/+1)     How often checking glucose at home? Freestyle William    BG readings on regimen: 170-low 200s  Hypoglycemia on the regimen?  No  Missed doses on regimen?  Yes    Diabetes Complications include:     Hyperglycemia     Complicating diabetes co morbidities:   History of MI, CHF, and CKD         HPI:   Patient is a 38 y.o. 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 He underwent RVAD removal and chest closure 6/30/2022.  He was weaned off  but he had to restarted due to RVF. He was eventually transitioned to milrinone (secondary to  shortage) now on 0.25 mcg/kg/min.  Patient also has history of driveline infection.  Patient presented to the emergency room today because of pleuritic chest pain that has been going on for the last 3 days however it was more intense today.  Also complains of having shortness of breath and fevers associated with it.  He was spiking fever of 102 in the emergency room with elevated white blood cell count up to 17,000. He was treated with Zosyn.  He was sent here for admission.  Patient was admitted a month back for COVID infection for which he received remdesivir for 3 days. Patient has ground-glass opacities on imaging from outside hospital. Endocrinology consulted for management of T2DM.      Lab Results   Component Value Date    HGBA1C 6.7 (H) 07/22/2023           Interval HPI:   Overnight events: No acute events overnight.  "Patient in room 314/314 A. Blood glucose stable. BG above goal on current insulin regimen (SSI, prandial, and basal insulin ). Steroid use- None.    Renal function- Normal   Vasopressors-  None       Endocrine will continue to follow and manage insulin orders inpatient.         Diet NPO     Eating:   NPO  Nausea: No  Hypoglycemia and intervention: No  Fever: No  TPN and/or TF: No      /69 (BP Location: Right arm, Patient Position: Lying)   Pulse 102   Temp 97.9 °F (36.6 °C) (Oral)   Resp 18   Ht 6' 3" (1.905 m)   Wt 90.7 kg (199 lb 15.3 oz)   SpO2 97%   BMI 24.99 kg/m²     Labs Reviewed and Include    Recent Labs   Lab 08/01/23  0342   *   CALCIUM 9.5   ALBUMIN 2.4*   PROT 7.5   *   K 4.7   CO2 22*      BUN 40*   CREATININE 1.3   ALKPHOS 151*   ALT 31   AST 55*   BILITOT 0.5     Lab Results   Component Value Date    WBC 34.78 (H) 08/01/2023    HGB 10.9 (L) 08/01/2023    HCT 32.8 (L) 08/01/2023    MCV 83 08/01/2023     (H) 08/01/2023     No results for input(s): "TSH", "FREET4" in the last 168 hours.  Lab Results   Component Value Date    HGBA1C 6.7 (H) 07/22/2023       Nutritional status:   Body mass index is 24.99 kg/m².  Lab Results   Component Value Date    ALBUMIN 2.4 (L) 08/01/2023    ALBUMIN 2.5 (L) 07/31/2023    ALBUMIN 2.2 (L) 07/30/2023     Lab Results   Component Value Date    PREALBUMIN 26 07/31/2023    PREALBUMIN 18 (L) 07/28/2023    PREALBUMIN 14 (L) 07/26/2023       Estimated Creatinine Clearance: 92.1 mL/min (based on SCr of 1.3 mg/dL).    Accu-Checks  Recent Labs     07/30/23  2040 07/30/23  2346 07/31/23  0757 07/31/23  1154 07/31/23  1155 07/31/23  1636 07/31/23  1958 08/01/23  0311 08/01/23  0320 08/01/23  0756   POCTGLUCOSE 193* 285* 327* 320* 322* 221* 179* >500* 309* 222*       Current Medications and/or Treatments Impacting Glycemic Control  Immunotherapy:    Immunosuppressants       None          Steroids:   Hormones (From admission, onward)      None "          Pressors:    Autonomic Drugs (From admission, onward)      None          Hyperglycemia/Diabetes Medications:   Antihyperglycemics (From admission, onward)      Start     Stop Route Frequency Ordered    07/31/23 2100  insulin detemir U-100 (Levemir) pen 33 Units         -- SubQ 2 times daily 07/31/23 0925    07/31/23 1130  insulin aspart U-100 pen 36 Units         -- SubQ 3 times daily with meals 07/31/23 0925    07/30/23 0907  insulin aspart U-100 pen 0-15 Units         -- SubQ Before meals, nightly and at 0200 PRN 07/30/23 0807            ASSESSMENT and PLAN    Cardiac/Vascular  LVAD (left ventricular assist device) present  Managed per primary team  Avoid hypoglycemia        ID  * Sepsis  Managed per primary team        Endocrine  Type 2 diabetes mellitus with hyperglycemia  BG goal 140-180    - Levemir to 33 units  BID  - Novolog to 26 units TID with meals  - Continue Moderate Dose Correction Scale  - BG monitoring ac/hs    At 16:00    - Levemir (Insulin Detemir) 28 units BID (20% reduction due to steroid taper)  - Novolog (Insulin Aspart) 30 units TIDWM and prn for BG excursions [facility] SSI (150/25) (20% reduction due to steroid taper)  - BG checks AC/HS/0200  - Hypoglycemia protocol in place      Discharge Planning:   TBD. Please notify endocrinology prior to discharge.    ** Please call Endocrine for any BG related issues **    Discharge plans: TBD    Lab Results   Component Value Date    HGBA1C 6.7 (H) 07/22/2023                  Michael Wyman, DNP, FNP  Endocrinology  [facility] - Cardiology Stepdown

## 2023-08-01 NOTE — ASSESSMENT & PLAN NOTE
-NICM  -Last 2D Echo 10/31/22: LVEF 10%, LVEDD 7.25 cm  -Euvolemic on examination today  -Current diuretic regimen:Lasix 80mg Qdaily  -Status post LVAD.  -GDMT: On Valsartan, Aldactone and Toprol at home. Valsartan 40 mg po bid resumed. K+ has trended up at 4.8 - D/C'd KCL 40 mEq bid. -Monitor K+ now that ARB resumed  -2g Na dietary restriction, 1500 mL fluid restriction, strict I/Os

## 2023-08-01 NOTE — TELEPHONE ENCOUNTER
Attempted to call patient's significant other/caregiver, Darlyn, about possibly bringing in patient's equipment before he is discharged, but the call could not be completed/possible invalid number.

## 2023-08-01 NOTE — SUBJECTIVE & OBJECTIVE
"Interval HPI:   Overnight events: No acute events overnight. Patient in room 314/314 A. Blood glucose stable. BG above goal on current insulin regimen (SSI, prandial, and basal insulin ). Steroid use- None.    Renal function- Normal   Vasopressors-  None       Endocrine will continue to follow and manage insulin orders inpatient.         Diet NPO     Eating:   NPO  Nausea: No  Hypoglycemia and intervention: No  Fever: No  TPN and/or TF: No      /69 (BP Location: Right arm, Patient Position: Lying)   Pulse 102   Temp 97.9 °F (36.6 °C) (Oral)   Resp 18   Ht 6' 3" (1.905 m)   Wt 90.7 kg (199 lb 15.3 oz)   SpO2 97%   BMI 24.99 kg/m²     Labs Reviewed and Include    Recent Labs   Lab 08/01/23  0342   *   CALCIUM 9.5   ALBUMIN 2.4*   PROT 7.5   *   K 4.7   CO2 22*      BUN 40*   CREATININE 1.3   ALKPHOS 151*   ALT 31   AST 55*   BILITOT 0.5     Lab Results   Component Value Date    WBC 34.78 (H) 08/01/2023    HGB 10.9 (L) 08/01/2023    HCT 32.8 (L) 08/01/2023    MCV 83 08/01/2023     (H) 08/01/2023     No results for input(s): "TSH", "FREET4" in the last 168 hours.  Lab Results   Component Value Date    HGBA1C 6.7 (H) 07/22/2023       Nutritional status:   Body mass index is 24.99 kg/m².  Lab Results   Component Value Date    ALBUMIN 2.4 (L) 08/01/2023    ALBUMIN 2.5 (L) 07/31/2023    ALBUMIN 2.2 (L) 07/30/2023     Lab Results   Component Value Date    PREALBUMIN 26 07/31/2023    PREALBUMIN 18 (L) 07/28/2023    PREALBUMIN 14 (L) 07/26/2023       Estimated Creatinine Clearance: 92.1 mL/min (based on SCr of 1.3 mg/dL).    Accu-Checks  Recent Labs     07/30/23  2040 07/30/23  2346 07/31/23  0757 07/31/23  1154 07/31/23  1155 07/31/23  1636 07/31/23  1958 08/01/23  0311 08/01/23  0320 08/01/23  0756   POCTGLUCOSE 193* 285* 327* 320* 322* 221* 179* >500* 309* 222*       Current Medications and/or Treatments Impacting Glycemic Control  Immunotherapy:    Immunosuppressants       None      "     Steroids:   Hormones (From admission, onward)      None          Pressors:    Autonomic Drugs (From admission, onward)      None          Hyperglycemia/Diabetes Medications:   Antihyperglycemics (From admission, onward)      Start     Stop Route Frequency Ordered    07/31/23 2100  insulin detemir U-100 (Levemir) pen 33 Units         -- SubQ 2 times daily 07/31/23 0925    07/31/23 1130  insulin aspart U-100 pen 36 Units         -- SubQ 3 times daily with meals 07/31/23 0925    07/30/23 0907  insulin aspart U-100 pen 0-15 Units         -- SubQ Before meals, nightly and at 0200 PRN 07/30/23 0807

## 2023-08-01 NOTE — PROGRESS NOTES
[facility] - Cardiology Stepdown  Heart Transplant  Progress Note    Patient Name: Kevan Queen  MRN: [id number]  Admission Date: 7/22/2023  Hospital Length of Stay: 10 days  Attending Physician: Marlo Marques MD  Primary Care Provider: ORALIA Cline  Principal Problem:Sepsis    Subjective:     **Interval History: Cultures from 7/29, 7/30 remain negative.  Orders placed for PICC team consult.  He has completed Dexamethasone therapy.  Will continue Ancef 8g IV daily for total of 6 weeks with end date 9/10.  Patient continues to complain of abdominal pain.  Will get CT of C/A/P with contrast per ID recommendations.  Home health orders placed and anticipate discharge tomorrow    Continuous Infusions:   milrinone 20mg/100ml D5W (200mcg/ml) 0.25 mcg/kg/min (08/01/23 0858)     Scheduled Meds:   aspirin  81 mg Oral Daily    busPIRone  10 mg Oral BID    ceFAZolin (ANCEF) 8 g in dextrose 5 % (D5W) 500 mL CONTINUOUS INFUSION  8 g Intravenous Q24H    furosemide  80 mg Oral Daily    insulin aspart U-100  36 Units Subcutaneous TIDWM    insulin detemir U-100  33 Units Subcutaneous BID    levETIRAcetam  1,000 mg Oral BID    mirtazapine  15 mg Oral Nightly    mupirocin   Nasal BID    polyethylene glycol  17 g Oral Daily    senna-docusate 8.6-50 mg  2 tablet Oral BID    valsartan  40 mg Oral BID     PRN Meds:acetaminophen, dextrose 10%, dextrose 10%, glucagon (human recombinant), glucose, glucose, HYDROcodone-acetaminophen, insulin aspart U-100    Review of patient's allergies indicates:   Allergen Reactions    Bumex [bumetanide] Hives    Torsemide Hives     Objective:     Vital Signs (Most Recent):  Temp: 97.9 °F (36.6 °C) (08/01/23 0750)  Pulse: 102 (08/01/23 0814)  Resp: 18 (08/01/23 0857)  BP: 105/69 (08/01/23 0752)  SpO2: 99 % (08/01/23 0800) Vital Signs (24h Range):  Temp:  [97.7 °F (36.5 °C)-99 °F (37.2 °C)] 97.9 °F (36.6 °C)  Pulse:  [] 102  Resp:  [17-19] 18  SpO2:  [95 %-100 %] 99 %  BP:  ()/(0-80) 105/69     Patient Vitals for the past 72 hrs (Last 3 readings):   Weight   08/01/23 0750 90.7 kg (199 lb 15.3 oz)   07/31/23 1450 90.3 kg (199 lb)   07/31/23 0750 90.6 kg (199 lb 11.8 oz)       Body mass index is 24.99 kg/m².      Intake/Output Summary (Last 24 hours) at 8/1/2023 1024  Last data filed at 8/1/2023 0542  Gross per 24 hour   Intake 1200 ml   Output 3400 ml   Net -2200 ml         Hemodynamic Parameters:       Telemetry: ST       Physical Exam  Constitutional:       Appearance: Normal appearance.   HENT:      Head: Normocephalic and atraumatic.   Eyes:      Conjunctiva/sclera: Conjunctivae normal.      Pupils: Pupils are equal, round, and reactive to light.   Neck:      Comments: Do not appreciate elevated neck veins  Cardiovascular:      Rate and Rhythm: Regular rhythm. Tachycardia present.      Comments: Smooth VAD hum  Pulmonary:      Effort: Pulmonary effort is normal.      Breath sounds: Normal breath sounds.   Abdominal:      General: Bowel sounds are normal.      Palpations: Abdomen is soft.   Musculoskeletal:         General: No swelling. Normal range of motion.      Cervical back: Normal range of motion and neck supple.   Skin:     General: Skin is warm and dry.      Capillary Refill: Capillary refill takes 2 to 3 seconds.   Neurological:      General: No focal deficit present.      Mental Status: He is alert and oriented to person, place, and time.   Psychiatric:         Mood and Affect: Mood normal.         Behavior: Behavior normal.         Thought Content: Thought content normal.         Judgment: Judgment normal.            Significant Labs:  CBC:  Recent Labs   Lab 07/30/23  0545 07/31/23  0501 08/01/23  0342   WBC 32.50* 35.42* 34.78*   RBC 3.67* 4.32* 3.97*   HGB 10.0* 11.7* 10.9*   HCT 29.9* 36.1* 32.8*   * 564* 510*   MCV 82 84 83   MCH 27.2 27.1 27.5   MCHC 33.4 32.4 33.2       BNP:  Recent Labs   Lab 07/26/23  0422 07/28/23  0431 07/31/23  0501   * 337*  159*       CMP:  Recent Labs   Lab 07/30/23  0545 07/31/23  0501 08/01/23  0342   * 307* 328*   CALCIUM 9.3 9.9 9.5   ALBUMIN 2.2* 2.5* 2.4*   PROT 7.2 8.1 7.5   * 132* 132*   K 4.8 4.5 4.7   CO2 22* 25 22*    97 100   BUN 27* 36* 40*   CREATININE 1.2 1.4 1.3   ALKPHOS 135 150* 151*   ALT 32 32 31   AST 47* 39 55*   BILITOT 0.6 0.7 0.5        Coagulation:   Recent Labs   Lab 07/29/23  0443 07/30/23  0545 07/31/23  0501 08/01/23  0716   INR 2.0* 2.6* 3.4* 2.5*   APTT 31.6 33.8* 33.8*  --        LDH:  Recent Labs   Lab 07/30/23  0545 07/31/23  0501 08/01/23  0342   * 477* 410*       Microbiology:  Microbiology Results (last 7 days)       Procedure Component Value Units Date/Time    Blood culture [[id number]] Collected: 07/30/23 0810    Order Status: Completed Specimen: Blood Updated: 08/01/23 1012     Blood Culture, Routine No Growth to date      No Growth to date      No Growth to date    Blood culture [[id number]] Collected: 07/30/23 0810    Order Status: Completed Specimen: Blood Updated: 08/01/23 1012     Blood Culture, Routine No Growth to date      No Growth to date      No Growth to date    Blood culture [[id number]]  (Abnormal) Collected: 07/28/23 0940    Order Status: Completed Specimen: Blood from Peripheral, Left Hand Updated: 08/01/23 0834     Blood Culture, Routine Gram stain aer bottle: Gram positive cocci in clusters resembling Staph      Positive results previously called 07/30/2023  13:09      STAPHYLOCOCCUS AUREUS  ID consult required at [facility].Martha,Jeanette and Alexandra hilton.  For susceptibility see order #[id number]      Blood culture [[id number]] Collected: 07/29/23 1130    Order Status: Completed Specimen: Blood Updated: 07/31/23 1412     Blood Culture, Routine No Growth to date      No Growth to date      No Growth to date    Narrative:      Please obtain 2 sets after RIJ is pulled, thank you    Blood culture [[id number]] Collected: 07/29/23 1132    Order Status:  Completed Specimen: Blood Updated: 07/31/23 1412     Blood Culture, Routine No Growth to date      No Growth to date      No Growth to date    Narrative:      Please obtain 2 sets after RIJ is pulled, thank you  Rt hand    Blood culture [[id number]]  (Abnormal)  (Susceptibility) Collected: 07/28/23 0939    Order Status: Completed Specimen: Blood from Antecubital, Left Arm Updated: 07/31/23 1026     Blood Culture, Routine Gram stain aer bottle: Gram positive cocci in clusters resembling Staph      Positive results previously called 07/29/2023  21:20      STAPHYLOCOCCUS AUREUS  ID consult required at [facility].      Blood culture [[id number]] Collected: 07/25/23 0450    Order Status: Completed Specimen: Blood Updated: 07/30/23 0612     Blood Culture, Routine No growth after 5 days.    Blood culture [[id number]]  (Abnormal)  (Susceptibility) Collected: 07/25/23 0451    Order Status: Completed Specimen: Blood Updated: 07/29/23 1045     Blood Culture, Routine Gram stain aer bottle: Gram positive cocci in clusters resembling Staph      Results called to and read back by:Laura Kilgore RN 07/27/2023  13:01      STAPHYLOCOCCUS AUREUS  ID consult required at [facility].      MRSA/SA Rapid ID by PCR from Blood culture [[id number]]  (Abnormal) Collected: 07/25/23 0451    Order Status: Completed Updated: 07/27/23 1438     Staph aureus ID by PCR Positive     Methicillin Resistant ID by PCR Negative    Blood culture [[id number]]  (Abnormal)  (Susceptibility) Collected: 07/23/23 1305    Order Status: Completed Specimen: Blood from Line, Jugular, Internal Right Updated: 07/26/23 1120     Blood Culture, Routine Gram stain aer bottle: Gram positive cocci in clusters resembling Staph      Results called to and read back by:Cong Urena RN 07/24/2023  16:05      STAPHYLOCOCCUS AUREUS  ID consult required at [facility].      Culture, Anaerobe  [[id number]] Collected: 07/22/23 0159    Order Status: Completed Specimen: Abscess from Abdomen Updated: 07/25/23 1134     Anaerobic Culture No anaerobes isolated            I have reviewed all pertinent labs within the past 24 hours.    Estimated Creatinine Clearance: 92.1 mL/min (based on SCr of 1.3 mg/dL).    Diagnostic Results:  I have reviewed and interpreted all pertinent imaging results/findings within the past 24 hours.    Assessment and Plan:     Patient is a 36 yo black male with stage d HFrEF, NICM, ? 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 He underwent RVAD removal and chest closure 6/30/2022.  He was weaned off  but he had to restarted due to RVF. He was eventually transitioned to milrinone (secondary to  shortage) now on 0.25 mcg/kg/min.  Patient also has history of driveline infection.  Patient presented to the emergency room today because of pleuritic chest pain that has been going on for the last 3 days however it was more intense today.  Also complains of having shortness of breath and fevers associated with it.  He was spiking fever of 102 in the emergency room with elevated white blood cell count up to 17,000. He was treated with Zosyn.  He was sent here for admission.  Patient was admitted a month back for COVID infection for which he received remdesivir for 3 days.  He is on doxycycline for chronic driveline infection.  He is on Milrinone for RV failure.  Patient denies having any low flow alarms on the pump.  Also denies having any lower extremity edema, increased discharge from his driveline site.  Patient has ground-glass opacities on imaging from outside hospital      * Sepsis  -Patient presents with elevated fevers, white blood cell count, elevated lactic acid.   -Possible Secondary to COVID-19 infection.  Completed  Remdesivir and on last day of Dexamethasone  -H/O chronic MSSA DLES infection for which he is on Doxycycline at home  -Blood cxs here +ve for MSSA through 7/28. Repeated 7/29 with NGTD. Repeated again 7/30  -7/29, 7/30 negative   -Orders placed for PICC line    -Continue Ancef 8g IV q 24 hours for total of 6 weeks with end date 9/10  -RIJ TLC D/C'd on 7/29  -Echo done 7/31 and no vegetations appreciated     Bacteremia due to Staphylococcus  -See sepsis    LVAD (left ventricular assist device) present  -HeartMate 3 Implanted on 6/29/2022 as DT with RV failure on milrinone at 0.25 mcg/kg/min.  -Continue Coumadin, Goal INR 2.0-3.0. therapeutic at 2.5 after coumadin held yesterday with INR 3.4   -LDH is stable.  Will continue to monitor daily.  -Speed set at 5100, LSL 4700 rpm  -Echo: 7/31/23 EF: 10-15%, LVEDD: 6.87 cm.  No vegation appreciated     Procedure: Device Interrogation Including analysis of device parameters  Current Settings: Ventricular Assist Device  Review of device function is stable    TXP LVAD INTERROGATIONS 8/1/2023 8/1/2023 7/31/2023 7/31/2023 7/31/2023 7/31/2023 7/31/2023   Type HeartMate3 HeartMate3 HeartMate3 HeartMate3 HeartMate3 HeartMate3 HeartMate3   Flow 4.1 4.4 4.4 4.4 4.2 4.6 4.6   Speed 5100 5100 5100 5100 5100 5100 5100   PI 5.6 3.3 3.4 3.1 3.2 3.9 4.4   Power (Serna) 3.7 3.7 3.6 3.3 3.7 3.7 3.7   LSL 4700 4700 4700 4700 4700 4700 4700   Low Flow Alarm - - - - - - -   High Power Alarm - - - - - - -   Pulsatility Pulse Pulse Intermittent pulse Pulse Pulse Pulse Pulse       Chronic combined systolic and diastolic heart failure  -NICM  -Last 2D Echo 10/31/22: LVEF 10%, LVEDD 7.25 cm  -Euvolemic on examination today  -Current diuretic regimen:Lasix 80mg Qdaily  -Status post LVAD.  -GDMT: On Valsartan, Aldactone and Toprol at home. Valsartan 40 mg po bid resumed. K+ has trended up at 4.8 - D/C'd KCL 40 mEq bid. 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  Now with new gram + bacteremia  -On Cefaxolin   -ID following   -Getting repeat CT of C/A/P today with contrast.     Type 2 diabetes mellitus with hyperglycemia  -Will keep him on sliding scale and a.c. HS    Seizure-like activity  -On on DEYA Irizarry  Heart Transplant  [facility] - Cardiology Stepdown

## 2023-08-01 NOTE — PLAN OF CARE
[facility]   Heart Transplant/VAD Clinic   [address] [place] [address]   [phone] after hours          [phone] fax     VAD HOME  HEALTH ORDERS      Admit to Home Health    Diagnosis:   Patient Active Problem List   Diagnosis    Anxiety disorder, unspecified    Anemia of chronic disease    Ecstasy abuse    Cannabis use disorder, severe, dependence    Chronic combined systolic and diastolic heart failure    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    Type 2 diabetes mellitus with hyperglycemia    Insomnia    Familial dilated cardiomyopathy    LVAD (left ventricular assist device) present    Seizure-like activity    Temporal lobe seizure    Examination of participant in clinical trial    Right heart failure secondary to left heart failure-post LVAD surgery    History of substance abuse    Sepsis    Bacteremia due to Staphylococcus    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 S/P LVAD placement.     Diet: Low Fat, Low cholesterol, 2Gm Na, Coumadin restrictions.    Acitivities: No Swimming, bathing, vacuuming, contact sports.    Fresh implants= Sternal Precautions    Nursing:   SN to complete comprehensive assessment including routine vital signs. Instruct on disease process and s/s of complications to report to MD. Review/verify medication list sent home with the patient at time of discharge  and instruct patient/caregiver as needed. Frequency may be adjusted depending on start of care date.    **LVAD driveline exit site dressing change is to be completed per LVAD patient/caregiver only**.    Notify MD if:  SBP > 120 or < 80;   MAP > 80 or < 65;   HR > 120 or < 60;   Temp > 101;   Weight gain >3lbs in 1 day or 5lbs in 1 week.    LABS:  SN to perform labs: PT/INR per Coumadin clinic [phone].   Follow up INR date: 8/3/23  No Finger  Sticks    HOME INFUSION THERAPY:  SN to perform Infusion Therapy/Central Line Care.  Review Central Line Care & Central Line Flush with patient.    Administer (drug and dose):   Milrinone 0.25mcg/kg/min x 93kg, intravenous, continuous  Cefazolin 8g, intravenous, administer over 24 hours, every 24 hours x 6weeks.  End date: 9/10/23    Central line care:  Scrub the Hub: Prior to accessing the line, always perform a 30 second alcohol scrub  Each lumen of the central line is to be flushed at least daily with 10 mL Normal Saline and 3 mL Heparin flush (100 units/mL)  Skilled Nurse (SN) may draw blood from IV access  Blood Draw Procedure:   - Aspirate at least 5 mL of blood   - Discard   - Obtain specimen   - Change posiflow cap   - Flush with 20 mL Normal Saline followed by a                 3-5 mL Heparin flush (100 units/mL)  Central :   - Sterile dressing changes are done weekly and as needed.   - Use chlor-hexadine scrub to cleanse site, apply Biopatch to insertion site,       apply securement device dressing   - Posi-flow caps are changed weekly and after EVERY lab draw.   - If sterile gauze is under dressing to control oozing,                 dressing change must be performed every 24 hours until gauze is not needed.      Aide to provide assistance with personal care, ADLs, and vital signs    Send initial Home Health orders to HTS attending physician on call.  Send follow up questions to VAD clinic MD [phone] or [phone].

## 2023-08-01 NOTE — NURSING
Notified MD. [place] pt HR now stays 130s to 140s, and pt is having BM now, and c/o LUQ pain 7/10, Norco given, WCTM.

## 2023-08-01 NOTE — SUBJECTIVE & OBJECTIVE
Interval History: No fevers documented overnight.   Pt reports tolerating abx without issues. Has some L pleuritic chest pain that is stable, otherwise denies new pain, n/v/d.     Review of Systems  Objective:     Vital Signs (Most Recent):  Temp: 97.9 °F (36.6 °C) (08/01/23 0750)  Pulse: 102 (08/01/23 0814)  Resp: 18 (08/01/23 0750)  BP: 105/69 (08/01/23 0752)  SpO2: 97 % (07/31/23 2346) Vital Signs (24h Range):  Temp:  [97.7 °F (36.5 °C)-99 °F (37.2 °C)] 97.9 °F (36.6 °C)  Pulse:  [] 102  Resp:  [17-19] 18  SpO2:  [95 %-100 %] 97 %  BP: ()/(0-80) 105/69     Weight: 90.7 kg (199 lb 15.3 oz)  Body mass index is 24.99 kg/m².    Estimated Creatinine Clearance: 92.1 mL/min (based on SCr of 1.3 mg/dL).     Physical Exam  Constitutional:       General: He is not in acute distress.     Appearance: He is not ill-appearing or toxic-appearing.   HENT:      Head: Normocephalic and atraumatic.   Pulmonary:      Effort: Pulmonary effort is normal. No respiratory distress.   Abdominal:      General: There is no distension.      Palpations: Abdomen is soft.      Tenderness: There is no abdominal tenderness.      Comments: DL - dressed; no tenderness or erythema   Skin:     General: Skin is warm and dry.   Neurological:      Mental Status: He is alert and oriented to person, place, and time.          Significant Labs:   Microbiology Results (last 7 days)       Procedure Component Value Units Date/Time    Blood culture [[id number]]  (Abnormal) Collected: 07/28/23 0940    Order Status: Completed Specimen: Blood from Peripheral, Left Hand Updated: 08/01/23 0834     Blood Culture, Routine Gram stain aer bottle: Gram positive cocci in clusters resembling Staph      Positive results previously called 07/30/2023  13:09      STAPHYLOCOCCUS AUREUS  ID consult required at [facility] and [facility].  For susceptibility see order #[id number]      Blood culture [[id number]] Collected: 07/29/23 1130    Order Status:  Completed Specimen: Blood Updated: 07/31/23 1412     Blood Culture, Routine No Growth to date      No Growth to date      No Growth to date    Narrative:      Please obtain 2 sets after RIJ is pulled, thank you    Blood culture [[id number]] Collected: 07/29/23 1132    Order Status: Completed Specimen: Blood Updated: 07/31/23 1412     Blood Culture, Routine No Growth to date      No Growth to date      No Growth to date    Narrative:      Please obtain 2 sets after RIJ is pulled, thank you  Rt hand    Blood culture [[id number]]  (Abnormal)  (Susceptibility) Collected: 07/28/23 0939    Order Status: Completed Specimen: Blood from Antecubital, Left Arm Updated: 07/31/23 1026     Blood Culture, Routine Gram stain aer bottle: Gram positive cocci in clusters resembling Staph      Positive results previously called 07/29/2023  21:20      STAPHYLOCOCCUS AUREUS  ID consult required at [facility].      Blood culture [[id number]] Collected: 07/30/23 0810    Order Status: Completed Specimen: Blood Updated: 07/31/23 1012     Blood Culture, Routine No Growth to date      No Growth to date    Blood culture [[id number]] Collected: 07/30/23 0810    Order Status: Completed Specimen: Blood Updated: 07/31/23 1012     Blood Culture, Routine No Growth to date      No Growth to date    Blood culture [[id number]] Collected: 07/25/23 0450    Order Status: Completed Specimen: Blood Updated: 07/30/23 0612     Blood Culture, Routine No growth after 5 days.    Blood culture [[id number]]  (Abnormal)  (Susceptibility) Collected: 07/25/23 0451    Order Status: Completed Specimen: Blood Updated: 07/29/23 1045     Blood Culture, Routine Gram stain aer bottle: Gram positive cocci in clusters resembling Staph      Results called to and read back by:Laura Kilgore RN 07/27/2023  13:01      STAPHYLOCOCCUS AUREUS  ID consult required at [facility].      MRSA/SA Rapid ID by PCR from Blood culture  [[id number]]  (Abnormal) Collected: 07/25/23 0451    Order Status: Completed Updated: 07/27/23 1438     Staph aureus ID by PCR Positive     Methicillin Resistant ID by PCR Negative    Blood culture [[id number]]  (Abnormal)  (Susceptibility) Collected: 07/23/23 1305    Order Status: Completed Specimen: Blood from Line, Jugular, Internal Right Updated: 07/26/23 1120     Blood Culture, Routine Gram stain aer bottle: Gram positive cocci in clusters resembling Staph      Results called to and read back by:Cong Urena RN 07/24/2023  16:05      STAPHYLOCOCCUS AUREUS  [facility]      Culture, Anaerobe [[id number]] Collected: 07/22/23 0159    Order Status: Completed Specimen: Abscess from Abdomen Updated: 07/25/23 1134     Anaerobic Culture No anaerobes isolated    Blood culture [[id number]]  (Abnormal)  (Susceptibility) Collected: 07/22/23 1058    Order Status: Completed Specimen: Blood from Peripheral, Hand, Right Updated: 07/25/23 1014     Blood Culture, Routine Gram stain aer bottle: Gram positive cocci in clusters resembling Staph      Results called to and read back by:Zaheer Rae RN 07/23/2023  06:41      STAPHYLOCOCCUS AUREUS  [facility].              Significant Imaging: I have reviewed all pertinent imaging results/findings within the past 24 hours.

## 2023-08-01 NOTE — ASSESSMENT & PLAN NOTE
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  -Continue Coumadin, Goal INR 2.0-3.0. therapeutic at 2.5 after coumadin held yesterday with INR 3.4   -LDH is stable.  Will continue to monitor daily.  -Speed set at 5100, LSL 4700 rpm  -Echo: 7/31/23 EF: 10-15%, LVEDD: 6.87 cm.  No vegation appreciated     Procedure: Device Interrogation Including analysis of device parameters  Current Settings: Ventricular Assist Device  Review of device function is stable    TXP LVAD INTERROGATIONS 8/1/2023 8/1/2023 7/31/2023 7/31/2023 7/31/2023 7/31/2023 7/31/2023   Type HeartMate3 HeartMate3 HeartMate3 HeartMate3 HeartMate3 HeartMate3 HeartMate3   Flow 4.1 4.4 4.4 4.4 4.2 4.6 4.6   Speed 5100 5100 5100 5100 5100 5100 5100   PI 5.6 3.3 3.4 3.1 3.2 3.9 4.4   Power (Serna) 3.7 3.7 3.6 3.3 3.7 3.7 3.7   LSL 4700 4700 4700 4700 4700 4700 4700   Low Flow Alarm - - - - - - -   High Power Alarm - - - - - - -   Pulsatility Pulse Pulse Intermittent pulse Pulse Pulse Pulse Pulse

## 2023-08-01 NOTE — PROGRESS NOTES
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 39 yo male with LVAD c/b prior MSSA DLESI s/p treatment admitted with MSSA bacteremia 2/2 to DLES. Admit blcx and repeat DLES cx with MSSA; prior PICC removed. Also found to have COVID, CXR with L >R opacity; s/p remdesivir and dex suspect leukocytosis 2/2 to steroids; however, given MSSA, potential foci possible. Denies new joint/back pain or diarrhea. CVC (placed during hospital stay) removed on 7/29, repeat blcx post-line removal ngtd. TTE unrevealing for vegetations. Recent CXR with stable L>R opacity (stable) with pleural effusion.     Recommendations:  -continue ancef 8g CI IV daily, anticipate 6w course, maria isabel 9/10  -will likely need suppressive abx therapy once pt completes his course of IV abx  -follow up repeat blcx, so far ngtd  -low threshold for CT c/a/p w cont if worsening pleuritic chest pain or abdominal pain to evaluate for source  -trend WBC              Thank you for your consult. I will follow-up with patient. Please contact us if you have any additional questions. Above d/w primary team.       50 minutes of total time spent on the encounter, which includes face to face time and non-face to face time preparing to see the patient (eg, review of tests), obtaining and/or reviewing separately obtained history, documenting clinical information in the electronic or other health record, independently interpreting results (not separately reported) and communicating results to the patient/family/caregiver, or care coordination (not  separately reported).       Laura Baldwin MD  Infectious Disease  [facility] - Cardiology Stepdown    Subjective:     Principal Problem:Sepsis    HPI: 38 year old male with a history of CHF s/p LVAD placement in June of 2022.  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  He was apparently on room air for most of that hospital stay.  He received 3 days of remdesivir.  He was discharged on oral doxycycline to complete a 14 day course for the MSSA drive line infection.  He had tested negative for COVID-19 following his treatment.  He is re-admitted with complaints of shortness of breath, chest pains and generalized ill feeling. COVID-19 testing is positive again.  Chest x-ray shows diffuse infiltrates bilaterally.  ID is consulted to assist with his management.      Interval History: No fevers documented overnight.   Pt reports tolerating abx without issues. Has some L pleuritic chest pain that is stable, otherwise denies new pain, n/v/d.     Review of Systems  Objective:     Vital Signs (Most Recent):  Temp: 97.9 °F (36.6 °C) (08/01/23 0750)  Pulse: 102 (08/01/23 0814)  Resp: 18 (08/01/23 0750)  BP: 105/69 (08/01/23 0752)  SpO2: 97 % (07/31/23 2346) Vital Signs (24h Range):  Temp:  [97.7 °F (36.5 °C)-99 °F (37.2 °C)] 97.9 °F (36.6 °C)  Pulse:  [] 102  Resp:  [17-19] 18  SpO2:  [95 %-100 %] 97 %  BP: ()/(0-80) 105/69     Weight: 90.7 kg (199 lb 15.3 oz)  Body mass index is 24.99 kg/m².    Estimated Creatinine Clearance: 92.1 mL/min (based on SCr of 1.3 mg/dL).     Physical Exam  Constitutional:       General: He is not in acute distress.     Appearance: He is not ill-appearing or toxic-appearing.   HENT:      Head: Normocephalic and atraumatic.   Pulmonary:      Effort: Pulmonary effort is normal. No respiratory distress.   Abdominal:      General: There is no distension.      Palpations: Abdomen is soft.       Tenderness: There is no abdominal tenderness.      Comments: DL - dressed; no tenderness or erythema   Skin:     General: Skin is warm and dry.   Neurological:      Mental Status: He is alert and oriented to person, place, and time.          Significant Labs:   Microbiology Results (last 7 days)       Procedure Component Value Units Date/Time    Blood culture [[id number]]  (Abnormal) Collected: 07/28/23 0940    Order Status: Completed Specimen: Blood from Peripheral, Left Hand Updated: 08/01/23 0834     Blood Culture, Routine Gram stain aer bottle: Gram positive cocci in clusters resembling Staph      Positive results previously called 07/30/2023  13:09      STAPHYLOCOCCUS AUREUS  ID consult required at [facility].  For susceptibility see order #[id number]      Blood culture [[id number]] Collected: 07/29/23 1130    Order Status: Completed Specimen: Blood Updated: 07/31/23 1412     Blood Culture, Routine No Growth to date      No Growth to date      No Growth to date    Narrative:      Please obtain 2 sets after RIJ is pulled, thank you    Blood culture [[id number]] Collected: 07/29/23 1132    Order Status: Completed Specimen: Blood Updated: 07/31/23 1412     Blood Culture, Routine No Growth to date      No Growth to date      No Growth to date    Narrative:      Please obtain 2 sets after RIJ is pulled, thank you  Rt hand    Blood culture [[id number]]  (Abnormal)  (Susceptibility) Collected: 07/28/23 0939    Order Status: Completed Specimen: Blood from Antecubital, Left Arm Updated: 07/31/23 1026     Blood Culture, Routine Gram stain aer bottle: Gram positive cocci in clusters resembling Staph      Positive results previously called 07/29/2023  21:20      STAPHYLOCOCCUS AUREUS  ID consult required at [facility].      Blood culture [[id number]] Collected: 07/30/23 0810    Order Status: Completed Specimen: Blood Updated: 07/31/23 1012     Blood Culture, Routine  No Growth to date      No Growth to date    Blood culture [[id number]] Collected: 07/30/23 0810    Order Status: Completed Specimen: Blood Updated: 07/31/23 1012     Blood Culture, Routine No Growth to date      No Growth to date    Blood culture [[id number]] Collected: 07/25/23 0450    Order Status: Completed Specimen: Blood Updated: 07/30/23 0612     Blood Culture, Routine No growth after 5 days.    Blood culture [[id number]]  (Abnormal)  (Susceptibility) Collected: 07/25/23 0451    Order Status: Completed Specimen: Blood Updated: 07/29/23 1045     Blood Culture, Routine Gram stain aer bottle: Gram positive cocci in clusters resembling Staph      Results called to and read back by:Laura Kilgore RN 07/27/2023  13:01      STAPHYLOCOCCUS AUREUS  ID consult required at [facility] and [facility] locations.      MRSA/SA Rapid ID by PCR from Blood culture [[id number]]  (Abnormal) Collected: 07/25/23 0451    Order Status: Completed Updated: 07/27/23 1438     Staph aureus ID by PCR Positive     Methicillin Resistant ID by PCR Negative    Blood culture [[id number]]  (Abnormal)  (Susceptibility) Collected: 07/23/23 1305    Order Status: Completed Specimen: Blood from Line, Jugular, Internal Right Updated: 07/26/23 1120     Blood Culture, Routine Gram stain aer bottle: Gram positive cocci in clusters resembling Staph      Results called to and read back by:Cong Urena RN 07/24/2023  16:05      STAPHYLOCOCCUS AUREUS  ID consult required at [facility] and [facility].      Culture, Anaerobe [[id number]] Collected: 07/22/23 0159    Order Status: Completed Specimen: Abscess from Abdomen Updated: 07/25/23 1134     Anaerobic Culture No anaerobes isolated    Blood culture [[id number]]  (Abnormal)  (Susceptibility) Collected: 07/22/23 1058    Order Status: Completed Specimen: Blood from Peripheral, Hand, Right Updated: 07/25/23 1014     Blood Culture, Routine Gram stain aer bottle: Gram positive cocci in clusters  resembling Staph      Results called to and read back by:Zaheer Rae RN 07/23/2023  06:41      STAPHYLOCOCCUS AUREUS  ID consult required at [facility] and [facility].              Significant Imaging: I have reviewed all pertinent imaging results/findings within the past 24 hours.

## 2023-08-01 NOTE — NURSING
Pt's VAD dressing changed per protocol using kit and sterile technique. Pt's drive line site is dry, intact with scant brown drainage at site and inner layer of old dressing, DLES is + (2). No kinks or frays on drive line, secured with melendez anchor. Pt tolerated dressing change well. Next dressing change due 08/02/2023.

## 2023-08-01 NOTE — ASSESSMENT & PLAN NOTE
I independently reviewed patient's lab work and images as documented. 37 yo male with LVAD c/b prior MSSA DLESI s/p treatment admitted with MSSA bacteremia 2/2 to DLES. Admit blcx and repeat DLES cx with MSSA; prior PICC removed. Also found to have COVID, CXR with L >R opacity; s/p remdesivir and dex suspect leukocytosis 2/2 to steroids. Denies new joint/back pain or diarrhea. CVC (placed during hospital stay) removed on 7/29, repeat blcx post-line removal ngtd. TTE unrevealing for vegetations. Recent CXR with stable L>R opacity (stable) with pleural effusion.     Recommendations:  -continue ancef 8g CI IV daily, anticipate 6w course, maria isabel 9/10  -will likely need suppressive abx therapy once pt completes his course of IV abx  -follow up repeat blcx, so far ngtd  -low threshold for CT c/a/p if worsening pleuritic chest pain or abdominal pain  -trend WBC    Outpatient Antibiotic Therapy Plan:    Please send referral to [facility].    1) Infection: MSSA bacteremia/LVAD DLESI    2) Discharge Antibiotics:    Intravenous antibiotics:   Ancef 8g continuous infusion daily      3) Therapy Duration:  6w    Estimated end date of IV antibiotics: 9/10/23    4) Outpatient Weekly Labs:    Order the following labs to be drawn on Mondays:    CBC   CMP       5) Fax Lab Results to Infectious Diseases Provider: Indy    [facility] Fax Number: [phone]    6) Outpatient Infectious Diseases Follow-up     Follow-up appointment will be arranged by the ID clinic and will be found in the patient's appointments tab.     Prior to discharge, please ensure the patient's follow-up has been scheduled.     If there is still no follow-up scheduled prior to discharge, please send an EPIC message to Lynn Sheppard in Infectious Diseases.

## 2023-08-01 NOTE — PROCEDURES
"Kevan Queen is a 38 y.o. male patient.    Temp: 97.9 °F (36.6 °C) (08/01/23 0750)  Pulse: (!) 118 (08/01/23 1122)  Resp: 18 (08/01/23 0857)  BP: 105/69 (08/01/23 0752)  SpO2: 99 % (08/01/23 0800)  Weight: 90.7 kg (199 lb 15.3 oz) (08/01/23 0750)  Height: 6' 3" (190.5 cm) (07/31/23 1450)    PICC  Date/Time: 8/1/2023 11:21 AM  Performed by: Alex Boateng RN  Assisting provider: Tara Yeung RN  Consent Done: Yes  Time out: Immediately prior to procedure a time out was called to verify the correct patient, procedure, equipment, support staff and site/side marked as required  Indications: med administration and vascular access  Anesthesia: local infiltration  Local anesthetic: lidocaine 1% without epinephrine  Anesthetic Total (mL): 3  Description of findings: PICC  Preparation: skin prepped with ChloraPrep  Skin prep agent dried: skin prep agent completely dried prior to procedure  Sterile barriers: all five maximum sterile barriers used - cap, mask, sterile gown, sterile gloves, and large sterile sheet  Hand hygiene: hand hygiene performed prior to central venous catheter insertion  Location details: right basilic  Catheter type: double lumen  Catheter size: 5 Fr  Catheter Length: 37cm    Ultrasound guidance: yes  Vessel Caliber: medium and patent, compressibility normal  Vascular Doppler: not done  Needle advanced into vessel with real time Ultrasound guidance.  Guidewire confirmed in vessel.  Image recorded and saved.  Sterile sheath used.  Number of attempts: 1  Post-procedure: blood return through all ports, chlorhexidine patch and sterile dressing applied  Technical procedures used: 3CG  Specimens: No  Implants: No  Assessment: placement verified by x-ray  Complications: none  Other complications: To get tip central was extremely difficult.  I would suggest future picc be attempted from the left side.          Name   8/1/2023    "

## 2023-08-01 NOTE — ASSESSMENT & PLAN NOTE
-Patient presents with elevated fevers, white blood cell count, elevated lactic acid.   -Possible Secondary to COVID-19 infection.  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 Repeated 7/29 with NGTD. Repeated again 7/30  -7/29, 7/30 negative   -Orders placed for PICC line    -Continue Ancef 8g IV q 24 hours for total of 6 weeks with end date 9/10  -RIJ TLC D/C'd on 7/29  -Echo done 7/31 and no vegetations appreciated

## 2023-08-01 NOTE — ASSESSMENT & PLAN NOTE
-H/O chronic MSSA DLES infection, on suppressive Doxycyline at home  -See above sepsis.  Now with new gram + bacteremia  -On Cefaxolin   -ID following   -Getting repeat CT of C/A/P today with contrast.

## 2023-08-01 NOTE — PROGRESS NOTES
Attempted to reach pt on room phone due to his cell phones are turned off. Also attempted to reach pt's SO but was unable to do so. Following with MDR for pt needs and discharge planning. SW remains available.

## 2023-08-01 NOTE — ASSESSMENT & PLAN NOTE
BG goal 140-180    - Levemir to 33 units  BID  - Novolog to 26 units TID with meals  - Continue Moderate Dose Correction Scale  - BG monitoring ac/hs    At 16:00    - Levemir (Insulin Detemir) 28 units BID (20% reduction due to steroid taper)  - Novolog (Insulin Aspart) 30 units TIDWM and prn for BG excursions MDC SSI (150/25) (20% reduction due to steroid taper)  - BG checks AC/HS/0200  - Hypoglycemia protocol in place      Discharge Planning:   TBD. Please notify endocrinology prior to discharge.    ** Please call Endocrine for any BG related issues **    Discharge plans: TBD    Lab Results   Component Value Date    HGBA1C 6.7 (H) 07/22/2023

## 2023-08-01 NOTE — NURSING
Contacted Favian with CTS regarding glucose result of 521. Stated aslong as patient is asymptomatic, continue to treat per scale and will make interventions as needed. Patient reports no adverse symptoms, merely high appetite. To be expected as patient has just been discontinued on steroid treatment.  Will continue to monitor.    PCT reported second glucose check on opposing digit of 300. Reported this glucose result to CTS.

## 2023-08-01 NOTE — SUBJECTIVE & OBJECTIVE
**Interval History: Cultures from 7/29, 7/30 remain negative.  Orders placed for PICC team consult.  He has completed Dexamethasone therapy.  Will continue Ancef 8g IV daily for total of 6 weeks with end date 9/10.  Patient continues to complain of abdominal pain.  Will get CT of C/A/P with contrast per ID recommendations.  Home health orders placed and anticipate discharge tomorrow    Continuous Infusions:   milrinone 20mg/100ml D5W (200mcg/ml) 0.25 mcg/kg/min (08/01/23 0858)     Scheduled Meds:   aspirin  81 mg Oral Daily    busPIRone  10 mg Oral BID    ceFAZolin (ANCEF) 8 g in dextrose 5 % (D5W) 500 mL CONTINUOUS INFUSION  8 g Intravenous Q24H    furosemide  80 mg Oral Daily    insulin aspart U-100  36 Units Subcutaneous TIDWM    insulin detemir U-100  33 Units Subcutaneous BID    levETIRAcetam  1,000 mg Oral BID    mirtazapine  15 mg Oral Nightly    mupirocin   Nasal BID    polyethylene glycol  17 g Oral Daily    senna-docusate 8.6-50 mg  2 tablet Oral BID    valsartan  40 mg Oral BID     PRN Meds:acetaminophen, dextrose 10%, dextrose 10%, glucagon (human recombinant), glucose, glucose, HYDROcodone-acetaminophen, insulin aspart U-100    Review of patient's allergies indicates:   Allergen Reactions    Bumex [bumetanide] Hives    Torsemide Hives     Objective:     Vital Signs (Most Recent):  Temp: 97.9 °F (36.6 °C) (08/01/23 0750)  Pulse: 102 (08/01/23 0814)  Resp: 18 (08/01/23 0857)  BP: 105/69 (08/01/23 0752)  SpO2: 99 % (08/01/23 0800) Vital Signs (24h Range):  Temp:  [97.7 °F (36.5 °C)-99 °F (37.2 °C)] 97.9 °F (36.6 °C)  Pulse:  [] 102  Resp:  [17-19] 18  SpO2:  [95 %-100 %] 99 %  BP: ()/(0-80) 105/69     Patient Vitals for the past 72 hrs (Last 3 readings):   Weight   08/01/23 0750 90.7 kg (199 lb 15.3 oz)   07/31/23 1450 90.3 kg (199 lb)   07/31/23 0750 90.6 kg (199 lb 11.8 oz)       Body mass index is 24.99 kg/m².      Intake/Output Summary (Last 24 hours) at 8/1/2023 1024  Last data filed at  8/1/2023 0542  Gross per 24 hour   Intake 1200 ml   Output 3400 ml   Net -2200 ml         Hemodynamic Parameters:       Telemetry: ST       Physical Exam  Constitutional:       Appearance: Normal appearance.   HENT:      Head: Normocephalic and atraumatic.   Eyes:      Conjunctiva/sclera: Conjunctivae normal.      Pupils: Pupils are equal, round, and reactive to light.   Neck:      Comments: Do not appreciate elevated neck veins  Cardiovascular:      Rate and Rhythm: Regular rhythm. Tachycardia present.      Comments: Smooth VAD hum  Pulmonary:      Effort: Pulmonary effort is normal.      Breath sounds: Normal breath sounds.   Abdominal:      General: Bowel sounds are normal.      Palpations: Abdomen is soft.   Musculoskeletal:         General: No swelling. Normal range of motion.      Cervical back: Normal range of motion and neck supple.   Skin:     General: Skin is warm and dry.      Capillary Refill: Capillary refill takes 2 to 3 seconds.   Neurological:      General: No focal deficit present.      Mental Status: He is alert and oriented to person, place, and time.   Psychiatric:         Mood and Affect: Mood normal.         Behavior: Behavior normal.         Thought Content: Thought content normal.         Judgment: Judgment normal.            Significant Labs:  CBC:  Recent Labs   Lab 07/30/23  0545 07/31/23  0501 08/01/23  0342   WBC 32.50* 35.42* 34.78*   RBC 3.67* 4.32* 3.97*   HGB 10.0* 11.7* 10.9*   HCT 29.9* 36.1* 32.8*   * 564* 510*   MCV 82 84 83   MCH 27.2 27.1 27.5   MCHC 33.4 32.4 33.2       BNP:  Recent Labs   Lab 07/26/23  0422 07/28/23  0431 07/31/23  0501   * 337* 159*       CMP:  Recent Labs   Lab 07/30/23  0545 07/31/23  0501 08/01/23  0342   * 307* 328*   CALCIUM 9.3 9.9 9.5   ALBUMIN 2.2* 2.5* 2.4*   PROT 7.2 8.1 7.5   * 132* 132*   K 4.8 4.5 4.7   CO2 22* 25 22*    97 100   BUN 27* 36* 40*   CREATININE 1.2 1.4 1.3   ALKPHOS 135 150* 151*   ALT 32 32 31    AST 47* 39 55*   BILITOT 0.6 0.7 0.5        Coagulation:   Recent Labs   Lab 07/29/23  0443 07/30/23  0545 07/31/23  0501 08/01/23  0716   INR 2.0* 2.6* 3.4* 2.5*   APTT 31.6 33.8* 33.8*  --        LDH:  Recent Labs   Lab 07/30/23  0545 07/31/23  0501 08/01/23  0342   * 477* 410*       Microbiology:  Microbiology Results (last 7 days)       Procedure Component Value Units Date/Time    Blood culture [[id number]] Collected: 07/30/23 0810    Order Status: Completed Specimen: Blood Updated: 08/01/23 1012     Blood Culture, Routine No Growth to date      No Growth to date      No Growth to date    Blood culture [[id number]] Collected: 07/30/23 0810    Order Status: Completed Specimen: Blood Updated: 08/01/23 1012     Blood Culture, Routine No Growth to date      No Growth to date      No Growth to date    Blood culture [[id number]]  (Abnormal) Collected: 07/28/23 0940    Order Status: Completed Specimen: Blood from Peripheral, Left Hand Updated: 08/01/23 0834     Blood Culture, Routine Gram stain aer bottle: Gram positive cocci in clusters resembling Staph      Positive results previously called 07/30/2023  13:09      STAPHYLOCOCCUS AUREUS  ID consult required at [facility] locations.  For susceptibility see order #[id number]      Blood culture [[id number]] Collected: 07/29/23 1130    Order Status: Completed Specimen: Blood Updated: 07/31/23 1412     Blood Culture, Routine No Growth to date      No Growth to date      No Growth to date    Narrative:      Please obtain 2 sets after RIJ is pulled, thank you    Blood culture [[id number]] Collected: 07/29/23 1132    Order Status: Completed Specimen: Blood Updated: 07/31/23 1412     Blood Culture, Routine No Growth to date      No Growth to date      No Growth to date    Narrative:      Please obtain 2 sets after RIJ is pulled, thank you  Rt hand    Blood culture [[id number]]  (Abnormal)  (Susceptibility) Collected: 07/28/23 0939    Order Status:  Completed Specimen: Blood from Antecubital, Left Arm Updated: 07/31/23 1026     Blood Culture, Routine Gram stain aer bottle: Gram positive cocci in clusters resembling Staph      Positive results previously called 07/29/2023  21:20      STAPHYLOCOCCUS AUREUS  ID consult required at [facility].      Blood culture [[id number]] Collected: 07/25/23 0450    Order Status: Completed Specimen: Blood Updated: 07/30/23 0612     Blood Culture, Routine No growth after 5 days.    Blood culture [[id number]]  (Abnormal)  (Susceptibility) Collected: 07/25/23 0451    Order Status: Completed Specimen: Blood Updated: 07/29/23 1045     Blood Culture, Routine Gram stain aer bottle: Gram positive cocci in clusters resembling Staph      Results called to and read back by:Laura Kilgore RN 07/27/2023  13:01      STAPHYLOCOCCUS AUREUS  ID consult required at [facility] and [facility].      MRSA/SA Rapid ID by PCR from Blood culture [[id number]]  (Abnormal) Collected: 07/25/23 0451    Order Status: Completed Updated: 07/27/23 1438     Staph aureus ID by PCR Positive     Methicillin Resistant ID by PCR Negative    Blood culture [[id number]]  (Abnormal)  (Susceptibility) Collected: 07/23/23 1305    Order Status: Completed Specimen: Blood from Line, Jugular, Internal Right Updated: 07/26/23 1120     Blood Culture, Routine Gram stain aer bottle: Gram positive cocci in clusters resembling Staph      Results called to and read back by:Cong Urena RN 07/24/2023  16:05      STAPHYLOCOCCUS AUREUS  ID consult required at [facility] and [facility].      Culture, Anaerobe [[id number]] Collected: 07/22/23 0159    Order Status: Completed Specimen: Abscess from Abdomen Updated: 07/25/23 1134     Anaerobic Culture No anaerobes isolated            I have reviewed all pertinent labs within the past 24 hours.    Estimated Creatinine Clearance: 92.1 mL/min (based on SCr of 1.3 mg/dL).    Diagnostic  Results:  I have reviewed and interpreted all pertinent imaging results/findings within the past 24 hours.

## 2023-08-01 NOTE — CONSULTS
Double lumen PICC to right basilic vein.  37 cm in length, 34 cm arm circumference and 0 cm exposed.   Lot # [id number].

## 2023-08-02 PROBLEM — J90 PLEURAL EFFUSION: Status: ACTIVE | Noted: 2023-08-02

## 2023-08-02 LAB
ALBUMIN SERPL BCP-MCNC: 2.4 G/DL (ref 3.5–5.2)
ALP SERPL-CCNC: 137 U/L (ref 55–135)
ALT SERPL W/O P-5'-P-CCNC: 22 U/L (ref 10–44)
ANION GAP SERPL CALC-SCNC: 9 MMOL/L (ref 8–16)
APTT PPP: 30.6 SEC (ref 21–32)
AST SERPL-CCNC: 39 U/L (ref 10–40)
BASOPHILS # BLD AUTO: 0.04 K/UL (ref 0–0.2)
BASOPHILS NFR BLD: 0.2 % (ref 0–1.9)
BILIRUB SERPL-MCNC: 0.7 MG/DL (ref 0.1–1)
BNP SERPL-MCNC: 82 PG/ML (ref 0–99)
BUN SERPL-MCNC: 32 MG/DL (ref 6–20)
CALCIUM SERPL-MCNC: 9.3 MG/DL (ref 8.7–10.5)
CHLORIDE SERPL-SCNC: 104 MMOL/L (ref 95–110)
CO2 SERPL-SCNC: 22 MMOL/L (ref 23–29)
CREAT SERPL-MCNC: 1.4 MG/DL (ref 0.5–1.4)
CRP SERPL-MCNC: 37.4 MG/L (ref 0–8.2)
DIFFERENTIAL METHOD: ABNORMAL
EOSINOPHIL # BLD AUTO: 0 K/UL (ref 0–0.5)
EOSINOPHIL NFR BLD: 0.2 % (ref 0–8)
ERYTHROCYTE [DISTWIDTH] IN BLOOD BY AUTOMATED COUNT: 24 % (ref 11.5–14.5)
EST. GFR  (NO RACE VARIABLE): >60 ML/MIN/1.73 M^2
GLUCOSE SERPL-MCNC: 184 MG/DL (ref 70–110)
HCT VFR BLD AUTO: 33.5 % (ref 40–54)
HGB BLD-MCNC: 11.1 G/DL (ref 14–18)
IMM GRANULOCYTES # BLD AUTO: 0.36 K/UL (ref 0–0.04)
IMM GRANULOCYTES NFR BLD AUTO: 1.5 % (ref 0–0.5)
INR PPP: 1.8 (ref 0.8–1.2)
INR PPP: 1.8 (ref 0.8–1.2)
LDH SERPL L TO P-CCNC: 359 U/L (ref 110–260)
LYMPHOCYTES # BLD AUTO: 3.4 K/UL (ref 1–4.8)
LYMPHOCYTES NFR BLD: 13.7 % (ref 18–48)
MAGNESIUM SERPL-MCNC: 1.9 MG/DL (ref 1.6–2.6)
MCH RBC QN AUTO: 27.1 PG (ref 27–31)
MCHC RBC AUTO-ENTMCNC: 33.1 G/DL (ref 32–36)
MCV RBC AUTO: 82 FL (ref 82–98)
MONOCYTES # BLD AUTO: 1.6 K/UL (ref 0.3–1)
MONOCYTES NFR BLD: 6.4 % (ref 4–15)
NEUTROPHILS # BLD AUTO: 19.3 K/UL (ref 1.8–7.7)
NEUTROPHILS NFR BLD: 78 % (ref 38–73)
NRBC BLD-RTO: 0 /100 WBC
PHOSPHATE SERPL-MCNC: 3.9 MG/DL (ref 2.7–4.5)
PLATELET # BLD AUTO: 417 K/UL (ref 150–450)
PMV BLD AUTO: 8.8 FL (ref 9.2–12.9)
POCT GLUCOSE: 159 MG/DL (ref 70–110)
POCT GLUCOSE: 180 MG/DL (ref 70–110)
POCT GLUCOSE: 194 MG/DL (ref 70–110)
POCT GLUCOSE: 198 MG/DL (ref 70–110)
POTASSIUM SERPL-SCNC: 4.3 MMOL/L (ref 3.5–5.1)
PREALB SERPL-MCNC: 29 MG/DL (ref 20–43)
PROT SERPL-MCNC: 7.4 G/DL (ref 6–8.4)
PROTHROMBIN TIME: 18.5 SEC (ref 9–12.5)
PROTHROMBIN TIME: 18.5 SEC (ref 9–12.5)
RBC # BLD AUTO: 4.09 M/UL (ref 4.6–6.2)
SODIUM SERPL-SCNC: 135 MMOL/L (ref 136–145)
WBC # BLD AUTO: 24.71 K/UL (ref 3.9–12.7)

## 2023-08-02 PROCEDURE — 25000003 PHARM REV CODE 250: Performed by: HOSPITALIST

## 2023-08-02 PROCEDURE — 85730 THROMBOPLASTIN TIME PARTIAL: CPT | Performed by: STUDENT IN AN ORGANIZED HEALTH CARE EDUCATION/TRAINING PROGRAM

## 2023-08-02 PROCEDURE — 25000003 PHARM REV CODE 250: Performed by: INTERNAL MEDICINE

## 2023-08-02 PROCEDURE — 99223 1ST HOSP IP/OBS HIGH 75: CPT | Mod: ,,, | Performed by: EMERGENCY MEDICINE

## 2023-08-02 PROCEDURE — 99233 PR SUBSEQUENT HOSPITAL CARE,LEVL III: ICD-10-PCS | Mod: ,,, | Performed by: PHYSICIAN ASSISTANT

## 2023-08-02 PROCEDURE — 80053 COMPREHEN METABOLIC PANEL: CPT | Performed by: NURSE PRACTITIONER

## 2023-08-02 PROCEDURE — 85025 COMPLETE CBC W/AUTO DIFF WBC: CPT | Performed by: NURSE PRACTITIONER

## 2023-08-02 PROCEDURE — 85610 PROTHROMBIN TIME: CPT | Performed by: HOSPITALIST

## 2023-08-02 PROCEDURE — 83615 LACTATE (LD) (LDH) ENZYME: CPT | Performed by: HOSPITALIST

## 2023-08-02 PROCEDURE — 84100 ASSAY OF PHOSPHORUS: CPT | Performed by: HOSPITALIST

## 2023-08-02 PROCEDURE — 25000003 PHARM REV CODE 250: Performed by: NURSE PRACTITIONER

## 2023-08-02 PROCEDURE — 83735 ASSAY OF MAGNESIUM: CPT | Performed by: HOSPITALIST

## 2023-08-02 PROCEDURE — 27000248 HC VAD-ADDITIONAL DAY

## 2023-08-02 PROCEDURE — 36415 COLL VENOUS BLD VENIPUNCTURE: CPT | Performed by: HOSPITALIST

## 2023-08-02 PROCEDURE — 86140 C-REACTIVE PROTEIN: CPT | Performed by: HOSPITALIST

## 2023-08-02 PROCEDURE — 99232 SBSQ HOSP IP/OBS MODERATE 35: CPT | Mod: ,,, | Performed by: NURSE PRACTITIONER

## 2023-08-02 PROCEDURE — 99233 SBSQ HOSP IP/OBS HIGH 50: CPT | Mod: ,,, | Performed by: STUDENT IN AN ORGANIZED HEALTH CARE EDUCATION/TRAINING PROGRAM

## 2023-08-02 PROCEDURE — 83880 ASSAY OF NATRIURETIC PEPTIDE: CPT | Performed by: HOSPITALIST

## 2023-08-02 PROCEDURE — 63600175 PHARM REV CODE 636 W HCPCS: Performed by: NURSE PRACTITIONER

## 2023-08-02 PROCEDURE — 63600175 PHARM REV CODE 636 W HCPCS: Performed by: HOSPITALIST

## 2023-08-02 PROCEDURE — 93750 INTERROGATION VAD IN PERSON: CPT | Mod: ,,, | Performed by: INTERNAL MEDICINE

## 2023-08-02 PROCEDURE — 93750 PR INTERROGATE VENT ASSIST DEV, IN PERSON, W PHYSICIAN ANALYSIS: ICD-10-PCS | Mod: ,,, | Performed by: INTERNAL MEDICINE

## 2023-08-02 PROCEDURE — 99233 PR SUBSEQUENT HOSPITAL CARE,LEVL III: ICD-10-PCS | Mod: ,,, | Performed by: STUDENT IN AN ORGANIZED HEALTH CARE EDUCATION/TRAINING PROGRAM

## 2023-08-02 PROCEDURE — 99233 SBSQ HOSP IP/OBS HIGH 50: CPT | Mod: ,,, | Performed by: PHYSICIAN ASSISTANT

## 2023-08-02 PROCEDURE — 84134 ASSAY OF PREALBUMIN: CPT | Performed by: HOSPITALIST

## 2023-08-02 PROCEDURE — 63600175 PHARM REV CODE 636 W HCPCS: Performed by: INTERNAL MEDICINE

## 2023-08-02 PROCEDURE — 27000207 HC ISOLATION

## 2023-08-02 PROCEDURE — A4216 STERILE WATER/SALINE, 10 ML: HCPCS | Performed by: INTERNAL MEDICINE

## 2023-08-02 PROCEDURE — 20600001 HC STEP DOWN PRIVATE ROOM

## 2023-08-02 PROCEDURE — 99223 PR INITIAL HOSPITAL CARE,LEVL III: ICD-10-PCS | Mod: ,,, | Performed by: EMERGENCY MEDICINE

## 2023-08-02 PROCEDURE — 99232 PR SUBSEQUENT HOSPITAL CARE,LEVL II: ICD-10-PCS | Mod: ,,, | Performed by: NURSE PRACTITIONER

## 2023-08-02 RX ORDER — INSULIN ASPART 100 [IU]/ML
20 INJECTION, SOLUTION INTRAVENOUS; SUBCUTANEOUS ONCE
Status: COMPLETED | OUTPATIENT
Start: 2023-08-02 | End: 2023-08-02

## 2023-08-02 RX ORDER — INSULIN ASPART 100 [IU]/ML
20 INJECTION, SOLUTION INTRAVENOUS; SUBCUTANEOUS
Status: DISCONTINUED | OUTPATIENT
Start: 2023-08-02 | End: 2023-08-07

## 2023-08-02 RX ADMIN — ACETAMINOPHEN 650 MG: 325 TABLET ORAL at 08:08

## 2023-08-02 RX ADMIN — CEFAZOLIN 8 G: 2 INJECTION, POWDER, FOR SOLUTION INTRAMUSCULAR; INTRAVENOUS at 10:08

## 2023-08-02 RX ADMIN — INSULIN ASPART 3 UNITS: 100 INJECTION, SOLUTION INTRAVENOUS; SUBCUTANEOUS at 09:08

## 2023-08-02 RX ADMIN — FUROSEMIDE 80 MG: 80 TABLET ORAL at 08:08

## 2023-08-02 RX ADMIN — BUSPIRONE HYDROCHLORIDE 10 MG: 10 TABLET ORAL at 09:08

## 2023-08-02 RX ADMIN — BUSPIRONE HYDROCHLORIDE 10 MG: 10 TABLET ORAL at 08:08

## 2023-08-02 RX ADMIN — WARFARIN SODIUM 4.5 MG: 2.5 TABLET ORAL at 04:08

## 2023-08-02 RX ADMIN — ACETAMINOPHEN 650 MG: 325 TABLET ORAL at 07:08

## 2023-08-02 RX ADMIN — INSULIN ASPART 20 UNITS: 100 INJECTION, SOLUTION INTRAVENOUS; SUBCUTANEOUS at 09:08

## 2023-08-02 RX ADMIN — INSULIN DETEMIR 24 UNITS: 100 INJECTION, SOLUTION SUBCUTANEOUS at 09:08

## 2023-08-02 RX ADMIN — INSULIN ASPART 20 UNITS: 100 INJECTION, SOLUTION INTRAVENOUS; SUBCUTANEOUS at 04:08

## 2023-08-02 RX ADMIN — INSULIN ASPART 2 UNITS: 100 INJECTION, SOLUTION INTRAVENOUS; SUBCUTANEOUS at 09:08

## 2023-08-02 RX ADMIN — INSULIN ASPART 20 UNITS: 100 INJECTION, SOLUTION INTRAVENOUS; SUBCUTANEOUS at 12:08

## 2023-08-02 RX ADMIN — Medication 10 ML: at 12:08

## 2023-08-02 RX ADMIN — MIRTAZAPINE 15 MG: 15 TABLET, FILM COATED ORAL at 09:08

## 2023-08-02 RX ADMIN — INSULIN ASPART 3 UNITS: 100 INJECTION, SOLUTION INTRAVENOUS; SUBCUTANEOUS at 12:08

## 2023-08-02 RX ADMIN — HYDROCODONE BITARTRATE AND ACETAMINOPHEN 1 TABLET: 5; 325 TABLET ORAL at 10:08

## 2023-08-02 RX ADMIN — MILRINONE LACTATE 0.25 MCG/KG/MIN: 0.2 INJECTION, SOLUTION INTRAVENOUS at 02:08

## 2023-08-02 RX ADMIN — Medication 10 ML: at 06:08

## 2023-08-02 RX ADMIN — LEVETIRACETAM 1000 MG: 500 TABLET, FILM COATED ORAL at 09:08

## 2023-08-02 RX ADMIN — HYDROCODONE BITARTRATE AND ACETAMINOPHEN 1 TABLET: 5; 325 TABLET ORAL at 09:08

## 2023-08-02 RX ADMIN — INSULIN ASPART 3 UNITS: 100 INJECTION, SOLUTION INTRAVENOUS; SUBCUTANEOUS at 04:08

## 2023-08-02 RX ADMIN — ASPIRIN 81 MG: 81 TABLET, COATED ORAL at 08:08

## 2023-08-02 RX ADMIN — SENNOSIDES AND DOCUSATE SODIUM 2 TABLET: 50; 8.6 TABLET ORAL at 08:08

## 2023-08-02 RX ADMIN — HYDROCODONE BITARTRATE AND ACETAMINOPHEN 1 TABLET: 5; 325 TABLET ORAL at 04:08

## 2023-08-02 RX ADMIN — INSULIN DETEMIR 24 UNITS: 100 INJECTION, SOLUTION SUBCUTANEOUS at 08:08

## 2023-08-02 RX ADMIN — VALSARTAN 40 MG: 40 TABLET, FILM COATED ORAL at 08:08

## 2023-08-02 RX ADMIN — VALSARTAN 40 MG: 40 TABLET, FILM COATED ORAL at 09:08

## 2023-08-02 RX ADMIN — LEVETIRACETAM 1000 MG: 500 TABLET, FILM COATED ORAL at 08:08

## 2023-08-02 NOTE — PLAN OF CARE
BG monitored. CT done during the day. VSS. LVAD number and doppler WNL. LVAD dressing changed per protocol ( see note). Continue on milrinone gtt  and ancef gtt.  Pt educated on fall risk and remained free from falls/trauma/injury. Denies chest pain, SOB, palpitations, dizziness, pain, or discomfort. Plan of care reviewed with pt, all questions answered. Bed locked in lowest position, call bell within reach, no acute distress noted, will continue to monitor.

## 2023-08-02 NOTE — ASSESSMENT & PLAN NOTE
Kevan Queen is a 38 y.o. gentleman with an LVAD with a pleural effusion in the setting of COVID and staph bacteremia.     - Recommend image guided placement of pigtail catheter into collection  - would defer VATS. Based on images, collection would likely be able to resolve without surgical intervention

## 2023-08-02 NOTE — ASSESSMENT & PLAN NOTE
I independently reviewed patient's lab work and images as documented. 39 yo male with LVAD c/b prior MSSA DLESI s/p treatment admitted with MSSA bacteremia 2/2 to DLES. Admit blcx and repeat DLES cx with MSSA; prior PICC removed. Also found to have COVID, CXR with L >R opacity; s/p remdesivir and dex suspect leukocytosis 2/2 to steroids. Denies new joint/back pain or diarrhea. CVC (placed during hospital stay) removed on 7/29, repeat blcx post-line removal ngtd. TTE unrevealing for vegetations. Recent CXR with stable L>R opacity (stable) with pleural effusion.  CT with L loculated effusion (lateral heart border and base); prior stranding noted around DL site resolved.     Recommendations:  -continue ancef 8g CI IV daily, anticipate 6w course, maria isabel 9/10   -will likely need suppressive abx therapy once pt completes his course of IV abx  -follow up repeat blcx, so far ngtd  -consult pulm for further evaluation of effusion/if amenable for drainage    Outpatient Antibiotic Therapy Plan:    Please send referral to [facility].    1) Infection: MSSA bacteremia/LVAD DLESI    2) Discharge Antibiotics:    Intravenous antibiotics:   Ancef 8g continuous infusion daily      3) Therapy Duration:  6w    Estimated end date of IV antibiotics: 9/10/23    4) Outpatient Weekly Labs:    Order the following labs to be drawn on Mondays:    CBC   CMP       5) Fax Lab Results to Infectious Diseases Provider: Nicolasa/Beverly    [facility] Fax Number: [phone]    6) Outpatient Infectious Diseases Follow-up     Follow-up appointment will be arranged by the ID clinic and will be found in the patient's appointments tab.     Prior to discharge, please ensure the patient's follow-up has been scheduled.     If there is still no follow-up scheduled prior to discharge, please send an EPIC message to Lynn Sheppard in Infectious Diseases.

## 2023-08-02 NOTE — SUBJECTIVE & OBJECTIVE
No current facility-administered medications on file prior to encounter.     Current Outpatient Medications on File Prior to Encounter   Medication Sig    blood sugar diagnostic Strp Use to test blood glucose 4 (four) times daily.    blood-glucose meter Misc Use as instructed    busPIRone (BUSPAR) 10 MG tablet Take 10 mg by mouth 2 (two) times daily.    cyclobenzaprine (FEXMID) 7.5 MG Tab Take 7.5 mg by mouth every 8 (eight) hours as needed.    doxycycline (VIBRAMYCIN) 100 MG Cap Take 1 capsule (100 mg total) by mouth 2 (two) times daily.    furosemide (LASIX) 80 MG tablet Take 1 tablet (80 mg total) by mouth once daily.    insulin aspart U-100 (NOVOLOG) 100 unit/mL (3 mL) InPn pen Inject 16 Units into the skin 3 (three) times daily with meals. Plus Sliding Scale: 151-200: +1, 201-250: +2, 251-300: +3, 301-350: +4 and call MD; Max Daily Dose: 60 units    insulin detemir U-100, Levemir, 100 unit/mL (3 mL) SubQ InPn pen Inject 22 Units into the skin 2 (two) times daily.    INV ASPIRIN 100MG/PLACEBO Take 1 capsule (100 mg) by mouth once daily. FOR INVESTIGATIONAL USE ONLY. Protocol: GASTON III subject # 5844.    lancets 33 gauge Misc Use to test blood glucose 4 (four) times daily.    levETIRAcetam (KEPPRA) 1000 MG tablet Take 1 tablet (1,000 mg total) by mouth 2 (two) times daily.    milrinone (PRIMACOR) 1 mg/mL injection Inject into the vein.    milrinone 20mg/100ml D5W, 200mcg/ml, (PRIMACOR) 20 mg/100 mL (200 mcg/mL) infusion Inject 34.875 mcg/min into the vein continuous.    mirtazapine (REMERON) 15 MG tablet Take 1 tablet (15 mg total) by mouth nightly.    potassium chloride (K-TAB) 20 mEq Take 40 mEq by mouth 3 (three) times daily.    spironolactone (ALDACTONE) 25 MG tablet Take 1 tablet (25 mg total) by mouth once daily.    valsartan (DIOVAN) 40 MG tablet Take 1 tablet (40 mg total) by mouth 2 (two) times daily.    warfarin (COUMADIN) 3 MG tablet Take 1.5 tablets (4.5mg) orally daily, except 2 tablets (6mg) on  Wednesdays and saturdays    warfarin (COUMADIN) 3 MG tablet Take 1.5-2 tablets (4.5-6 mg total) by mouth Daily.    [DISCONTINUED] digoxin (LANOXIN) 125 mcg tablet Take 1 tablet (0.125 mg total) by mouth once daily.    [DISCONTINUED] EScitalopram oxalate (LEXAPRO) 5 MG Tab Take 1 tablet (5 mg total) by mouth once daily.    [DISCONTINUED] insulin (LANTUS SOLOSTAR U-100 INSULIN) glargine 100 units/mL (3mL) SubQ pen Inject 10 Units into the skin every evening. (Patient not taking: Reported on 5/24/2022)    [DISCONTINUED] metOLazone (ZAROXOLYN) 2.5 MG tablet Take 2.5 mg by mouth every other day.    [DISCONTINUED] metoprolol succinate (TOPROL-XL) 25 MG 24 hr tablet TAKE 1 TABLET(25 MG) BY MOUTH EVERY DAY    [DISCONTINUED] pantoprazole (PROTONIX) 40 MG tablet Take 1 tablet (40 mg total) by mouth once daily.       Review of patient's allergies indicates:   Allergen Reactions    Bumex [bumetanide] Hives    Torsemide Hives       Past Medical History:   Diagnosis Date    Acute respiratory failure with hypoxia 7/22/2023    Arthritis     Awareness alteration, transient 9/1/2022    Cardiomyopathy     CHF (congestive heart failure) 10/01/2020    COVID-19 6/3/2023    Diabetes mellitus     Dilated cardiomyopathy 10/26/2020    Drug abuse 10/2020    Headache 4/19/2023    Hyperglycemia 12/16/2022    Hyperosmolar hyperglycemic state (HHS) 5/25/2022    ICD (implantable cardioverter-defibrillator) in place 10/26/2020    Left ventricular assist device (LVAD) complication, initial encounter 6/5/2023    Muscle cramping 6/15/2022    Renal disorder     SOB (shortness of breath) 6/13/2022    Tingling in extremities 7/13/2022     Past Surgical History:   Procedure Laterality Date    APPLICATION OF WOUND VACUUM-ASSISTED CLOSURE DEVICE N/A 6/30/2022    Procedure: APPLICATION, WOUND VAC;  Surgeon: Luis F Paige MD;  Location: [facility] OR [address] [place] [address];  Service: Cardiovascular;  Laterality: N/A;  50 x 5 cm    CARDIAC DEFIBRILLATOR PLACEMENT       IMPLANTATION OF RIGHT VENTRICULAR ASSIST DEVICE (RVAD) N/A 6/29/2022    Procedure: INSERTION, RVAD;  Surgeon: Luis F Paige MD;  [facility];  Service: Cardiovascular;  Laterality: N/A;    INSERTION OF GRAFT TO PERICARDIUM Right 6/30/2022    Procedure: INSERTION-RIGHT VENTRICULAR ASSIST DEVICE;  Surgeon: Luis F Paige MD;  Location: [facility] OR [facility];  Service: Cardiovascular;  Laterality: Right;    IRRIGATION OF MEDIASTINUM  6/30/2022    Procedure: IRRIGATION, MEDIASTINUM;  Surgeon: Luis F Paige MD;  Location: [facility] OR [facility];  Service: Cardiovascular;;    LEFT VENTRICULAR ASSIST DEVICE Left 6/23/2022    Procedure: INSERTION-LEFT VENTRICULAR ASSIST DEVICE;  Surgeon: Luis F Paige MD;  [facility];  Service: Cardiovascular;  Laterality: Left;    LEFT VENTRICULAR ASSIST DEVICE N/A 6/29/2022    Procedure: INSERTION-LEFT VENTRICULAR ASSIST DEVICE;  Surgeon: Luis F Paige MD;  [facility];  Service: Cardiovascular;  Laterality: N/A;    RIGHT HEART CATHETERIZATION Right 4/8/2022    Procedure: INSERTION, CATHETER, RIGHT HEART;  Surgeon: Luca Lopez Jr., MD;  Location: [facility] CATH LAB;  Service: Cardiology;  Laterality: Right;    RIGHT HEART CATHETERIZATION Right 4/19/2022    Procedure: INSERTION, CATHETER, RIGHT HEART;  Surgeon: Josh Pulido MD;  Location: [facility] CATH LAB;  Service: Cardiology;  Laterality: Right;    RIGHT HEART CATHETERIZATION Right 7/21/2022    Procedure: INSERTION, CATHETER, RIGHT HEART;  Surgeon: Dalia Crum MD;  Location: [facility] CATH LAB;  Service: Cardiology;  Laterality: Right;    RIGHT HEART CATHETERIZATION Right 10/31/2022    Procedure: INSERTION, CATHETER, RIGHT HEART;  Surgeon: Dalia Crum MD;  Location: [facility] CATH LAB;  Service: Cardiology;  Laterality: Right;    STERNAL WOUND CLOSURE N/A 6/30/2022    Procedure: CLOSURE, WOUND, STERNUM;  Surgeon: Luis F Paige MD;  Location: [facility] OR [address] [place] [address];  Service: Cardiovascular;   Laterality: N/A;     Family History       Problem Relation (Age of Onset)    Diverticulosis Brother    Heart attack Maternal Grandmother, Maternal Grandfather    Heart failure Father          Tobacco Use    Smoking status: Former     Current packs/day: 0.00     Average packs/day: 0.5 packs/day for 16.6 years (8.3 ttl pk-yrs)     Types: Cigarettes     Start date: 10/1/2004     Quit date: 2021     Years since quittin.2    Smokeless tobacco: Never   Substance and Sexual Activity    Alcohol use: Not Currently    Drug use: Not Currently     Types: Marijuana, MDMA (Ecstacy)    Sexual activity: Yes     Partners: Female     Birth control/protection: None     Review of Systems   Constitutional:  Positive for fever. Negative for chills.   HENT:  Negative for hearing loss and sore throat.    Eyes:  Negative for discharge and visual disturbance.   Respiratory:  Negative for chest tightness and shortness of breath.    Cardiovascular:  Positive for chest pain. Negative for leg swelling.   Gastrointestinal:  Negative for abdominal distention, abdominal pain, nausea and vomiting.   Genitourinary:  Negative for difficulty urinating and hematuria.   Musculoskeletal:  Negative for back pain and joint swelling.   Skin:  Negative for color change and rash.   Neurological:  Negative for numbness and headaches.     Objective:     Vital Signs (Most Recent):  Temp: 98.2 °F (36.8 °C) (23 1150)  Pulse: (!) 118 (23 1219)  Resp: 18 (23 1150)  BP: (!) 82/59 (23 1152)  SpO2: 99 % (23 1150) Vital Signs (24h Range):  Temp:  [98.1 °F (36.7 °C)-98.6 °F (37 °C)] 98.2 °F (36.8 °C)  Pulse:  [108-126] 118  Resp:  [18-20] 18  SpO2:  [95 %-99 %] 99 %  BP: ()/(0-69) 82/59     Weight: 90.3 kg (199 lb 1.2 oz)  Body mass index is 24.88 kg/m².    Intake/Output - Last 3 Shifts          0700   0659  07 0659  07 0659    P.O. 1440 960 240    Total Intake(mL/kg) 1440 (15.9) 960 (10.6)  240 (2.7)    Urine (mL/kg/hr) 3900 (1.8) 3150 (1.4) 1850 (2.7)    Stool 0 0     Total Output 3900 3150 1850    Net -2460 -2190 -1610           Stool Occurrence 1 x 1 x             SpO2: 99 %        Physical Exam  Constitutional:       General: He is not in acute distress.     Appearance: Normal appearance.   HENT:      Head: Normocephalic and atraumatic.   Cardiovascular:      Rate and Rhythm: Normal rate and regular rhythm.   Pulmonary:      Effort: Pulmonary effort is normal. No respiratory distress.      Breath sounds: Normal breath sounds.   Abdominal:      General: Abdomen is flat. There is no distension.      Palpations: Abdomen is soft.      Tenderness: There is no abdominal tenderness.      Comments: Drive line in place with dressings in place  No tenderness around drive line   Neurological:      General: No focal deficit present.      Mental Status: He is alert and oriented to person, place, and time.   Psychiatric:         Behavior: Behavior normal.         Thought Content: Thought content normal.            Significant Labs:  CBC:   Recent Labs   Lab 08/02/23  0604   WBC 24.71*   RBC 4.09*   HGB 11.1*   HCT 33.5*      MCV 82   MCH 27.1   MCHC 33.1     CMP:   Recent Labs   Lab 08/02/23  0604   *   CALCIUM 9.3   ALBUMIN 2.4*   PROT 7.4   *   K 4.3   CO2 22*      BUN 32*   CREATININE 1.4   ALKPHOS 137*   ALT 22   AST 39   BILITOT 0.7       Significant Diagnostics:  I have reviewed all pertinent imaging results/findings within the past 24 hours.    VTE Risk Mitigation (From admission, onward)           Ordered     warfarin tablet 4.5 mg  Daily         08/02/23 0976

## 2023-08-02 NOTE — CARE UPDATE
"RAPID RESPONSE NURSE CHART REVIEW       Chart Reviewed: 08/02/2023, 1:14 PM    MRN: [id number]  Bed: 314/314 A    Dx: Sepsis    Kevan Queen has a past medical history of Acute respiratory failure with hypoxia, Arthritis, Awareness alteration, transient, Cardiomyopathy, CHF (congestive heart failure), COVID-19, Diabetes mellitus, Dilated cardiomyopathy, Drug abuse, Headache, Hyperglycemia, Hyperosmolar hyperglycemic state (HHS), ICD (implantable cardioverter-defibrillator) in place, Left ventricular assist device (LVAD) complication, initial encounter, Muscle cramping, Renal disorder, SOB (shortness of breath), and Tingling in extremities.    Last VS: BP (!) 82/59 (BP Location: Left arm, Patient Position: Lying)   Pulse (!) 118   Temp 98.2 °F (36.8 °C) (Oral)   Resp 18   Ht 6' 3" (1.905 m)   Wt 90.3 kg (199 lb 1.2 oz)   SpO2 99%   BMI 24.88 kg/m²     24H Vital Sign Range:  Temp:  [98.1 °F (36.7 °C)-98.6 °F (37 °C)]   Pulse:  [108-126]   Resp:  [18-20]   BP: ()/(0-69)   SpO2:  [95 %-99 %]     Level of Consciousness (AVPU): alert    Recent Labs     07/31/23  0501 08/01/23  0342 08/02/23  0604   WBC 35.42* 34.78* 24.71*   HGB 11.7* 10.9* 11.1*   HCT 36.1* 32.8* 33.5*   * 510* 417       Recent Labs     07/31/23  0501 08/01/23  0342 08/02/23  0604   * 132* 135*   K 4.5 4.7 4.3   CL 97 100 104   CO2 25 22* 22*   BUN 36* 40* 32*   CREATININE 1.4 1.3 1.4   * 328* 184*   PHOS 4.1 3.3 3.9   MG 2.0 2.0 1.9        No results for input(s): "PH", "PCO2", "PO2", "HCO3", "POCSATURATED", "BE" in the last 72 hours.     OXYGEN:  Flow (L/min): 2          MEWS score: 4    Rounding completed with charge RN Thao Toribio for tachycardia. contacted for tachycardia. No concerns verbalized at this time. Instructed to call [phone] for further concerns or assistance.    Marcella Lopez RN        "

## 2023-08-02 NOTE — PROGRESS NOTES
[facility] - Cardiology Stepdown  Heart Transplant  Progress Note    Patient Name: Kevan Queen  MRN: [id number]  Admission Date: 7/22/2023  Hospital Length of Stay: 11 days  Attending Physician: Marlo Marques MD  Primary Care Provider: ORALIA Cline  Principal Problem:Sepsis    Subjective:     **Interval History: PICC line placed yesterday.  CT of chest with contrast done yesterday and found to have loculated effusion along the left heart border and with small left pleural effusion.  Pulm consulted and Thoracic surgery consult for VATS.  If not candidate for VATS then will consult IR for pigtail catheter and can tx with lytics once daily.      Continuous Infusions:   milrinone 20mg/100ml D5W (200mcg/ml) 0.25 mcg/kg/min (08/01/23 2332)     Scheduled Meds:   aspirin  81 mg Oral Daily    busPIRone  10 mg Oral BID    ceFAZolin (ANCEF) 8 g in dextrose 5 % (D5W) 500 mL CONTINUOUS INFUSION  8 g Intravenous Q24H    furosemide  80 mg Oral Daily    insulin aspart U-100  20 Units Subcutaneous TIDWM    insulin detemir U-100  24 Units Subcutaneous BID    levETIRAcetam  1,000 mg Oral BID    mirtazapine  15 mg Oral Nightly    polyethylene glycol  17 g Oral Daily    senna-docusate 8.6-50 mg  2 tablet Oral BID    sodium chloride 0.9%  10 mL Intravenous Q6H    valsartan  40 mg Oral BID    warfarin  4.5 mg Oral Daily     PRN Meds:acetaminophen, dextrose 10%, dextrose 10%, glucagon (human recombinant), glucose, glucose, HYDROcodone-acetaminophen, insulin aspart U-100, Flushing PICC Protocol **AND** sodium chloride 0.9% **AND** sodium chloride 0.9%    Review of patient's allergies indicates:   Allergen Reactions    Bumex [bumetanide] Hives    Torsemide Hives     Objective:     Vital Signs (Most Recent):  Temp: 98.2 °F (36.8 °C) (08/02/23 1150)  Pulse: (!) 118 (08/02/23 1219)  Resp: 18 (08/02/23 1150)  BP: (!) 82/59 (08/02/23 1152)  SpO2: 99 % (08/02/23 1150) Vital Signs (24h Range):  Temp:  [98.1 °F (36.7 °C)-98.6  °F (37 °C)] 98.2 °F (36.8 °C)  Pulse:  [108-126] 118  Resp:  [18-20] 18  SpO2:  [95 %-99 %] 99 %  BP: ()/(0-69) 82/59     Patient Vitals for the past 72 hrs (Last 3 readings):   Weight   08/02/23 0750 90.3 kg (199 lb 1.2 oz)   08/01/23 0750 90.7 kg (199 lb 15.3 oz)   07/31/23 1450 90.3 kg (199 lb)       Body mass index is 24.88 kg/m².      Intake/Output Summary (Last 24 hours) at 8/2/2023 1232  Last data filed at 8/2/2023 1216  Gross per 24 hour   Intake 600 ml   Output 2800 ml   Net -2200 ml         Hemodynamic Parameters:       Telemetry: ST       Physical Exam  Constitutional:       Appearance: Normal appearance.   HENT:      Head: Normocephalic and atraumatic.   Eyes:      Conjunctiva/sclera: Conjunctivae normal.      Pupils: Pupils are equal, round, and reactive to light.   Neck:      Comments: Do not appreciate elevated neck veins  Cardiovascular:      Rate and Rhythm: Regular rhythm. Tachycardia present.      Comments: Smooth VAD hum  Pulmonary:      Effort: Pulmonary effort is normal.      Breath sounds: Normal breath sounds.   Abdominal:      General: Bowel sounds are normal.      Palpations: Abdomen is soft.   Musculoskeletal:         General: No swelling. Normal range of motion.      Cervical back: Normal range of motion and neck supple.   Skin:     General: Skin is warm and dry.      Capillary Refill: Capillary refill takes 2 to 3 seconds.   Neurological:      General: No focal deficit present.      Mental Status: He is alert and oriented to person, place, and time.   Psychiatric:         Mood and Affect: Mood normal.         Behavior: Behavior normal.         Thought Content: Thought content normal.         Judgment: Judgment normal.            Significant Labs:  CBC:  Recent Labs   Lab 07/31/23  0501 08/01/23  0342 08/02/23  0604   WBC 35.42* 34.78* 24.71*   RBC 4.32* 3.97* 4.09*   HGB 11.7* 10.9* 11.1*   HCT 36.1* 32.8* 33.5*   * 510* 417   MCV 84 83 82   MCH 27.1 27.5 27.1   MCHC 32.4  33.2 33.1       BNP:  Recent Labs   Lab 07/28/23  0431 07/31/23  0501 08/02/23  0604   * 159* 82       CMP:  Recent Labs   Lab 07/31/23  0501 08/01/23  0342 08/02/23  0604   * 328* 184*   CALCIUM 9.9 9.5 9.3   ALBUMIN 2.5* 2.4* 2.4*   PROT 8.1 7.5 7.4   * 132* 135*   K 4.5 4.7 4.3   CO2 25 22* 22*   CL 97 100 104   BUN 36* 40* 32*   CREATININE 1.4 1.3 1.4   ALKPHOS 150* 151* 137*   ALT 32 31 22   AST 39 55* 39   BILITOT 0.7 0.5 0.7        Coagulation:   Recent Labs   Lab 07/30/23  0545 07/31/23  0501 08/01/23  0716 08/02/23  0604   INR 2.6* 3.4* 2.5* 1.8*  1.8*   APTT 33.8* 33.8*  --  30.6       LDH:  Recent Labs   Lab 07/31/23  0501 08/01/23  0342 08/02/23  0604   * 410* 359*       Microbiology:  Microbiology Results (last 7 days)       Procedure Component Value Units Date/Time    Blood culture [[id number]] Collected: 07/30/23 0810    Order Status: Completed Specimen: Blood Updated: 08/02/23 1012     Blood Culture, Routine No Growth to date      No Growth to date      No Growth to date      No Growth to date    Blood culture [[id number]] Collected: 07/30/23 0810    Order Status: Completed Specimen: Blood Updated: 08/02/23 1012     Blood Culture, Routine No Growth to date      No Growth to date      No Growth to date      No Growth to date    Blood culture [[id number]] Collected: 07/29/23 1130    Order Status: Completed Specimen: Blood Updated: 08/01/23 1412     Blood Culture, Routine No Growth to date      No Growth to date      No Growth to date      No Growth to date    Narrative:      Please obtain 2 sets after RIJ is pulled, thank you    Blood culture [[id number]] Collected: 07/29/23 1132    Order Status: Completed Specimen: Blood Updated: 08/01/23 1412     Blood Culture, Routine No Growth to date      No Growth to date      No Growth to date      No Growth to date    Narrative:      Please obtain 2 sets after RIJ is pulled, thank you  Rt hand    Blood culture [[id number]]  (Abnormal)  Collected: 07/28/23 0940    Order Status: Completed Specimen: Blood from Peripheral, Left Hand Updated: 08/01/23 0834     Blood Culture, Routine Gram stain aer bottle: Gram positive cocci in clusters resembling Staph      Positive results previously called 07/30/2023  13:09      STAPHYLOCOCCUS AUREUS  ID consult required at [facility].  For susceptibility see order #[id number]      Blood culture [[id number]]  (Abnormal)  (Susceptibility) Collected: 07/28/23 0939    Order Status: Completed Specimen: Blood from Antecubital, Left Arm Updated: 07/31/23 1026     Blood Culture, Routine Gram stain aer bottle: Gram positive cocci in clusters resembling Staph      Positive results previously called 07/29/2023  21:20      STAPHYLOCOCCUS AUREUS  ID consult required at [facility] and [facility].      Blood culture [[id number]] Collected: 07/25/23 0450    Order Status: Completed Specimen: Blood Updated: 07/30/23 0612     Blood Culture, Routine No growth after 5 days.    Blood culture [[id number]]  (Abnormal)  (Susceptibility) Collected: 07/25/23 0451    Order Status: Completed Specimen: Blood Updated: 07/29/23 1045     Blood Culture, Routine Gram stain aer bottle: Gram positive cocci in clusters resembling Staph      Results called to and read back by:Laura Kilgore RN 07/27/2023  13:01      STAPHYLOCOCCUS AUREUS  ID consult required at [facility] and [facility].      MRSA/SA Rapid ID by PCR from Blood culture [[id number]]  (Abnormal) Collected: 07/25/23 0451    Order Status: Completed Updated: 07/27/23 1438     Staph aureus ID by PCR Positive     Methicillin Resistant ID by PCR Negative            I have reviewed all pertinent labs within the past 24 hours.    Estimated Creatinine Clearance: 85.5 mL/min (based on SCr of 1.4 mg/dL).    Diagnostic Results:  I have reviewed and interpreted all pertinent imaging results/findings within the past 24 hours.    Assessment and  Plan:     Patient is a 38 yo black male with stage d HFrEF, NICM, ? 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 He underwent RVAD removal and chest closure 6/30/2022.  He was weaned off  but he had to restarted due to RVF. He was eventually transitioned to milrinone (secondary to  shortage) now on 0.25 mcg/kg/min.  Patient also has history of driveline infection.  Patient presented to the emergency room today because of pleuritic chest pain that has been going on for the last 3 days however it was more intense today.  Also complains of having shortness of breath and fevers associated with it.  He was spiking fever of 102 in the emergency room with elevated white blood cell count up to 17,000. He was treated with Zosyn.  He was sent here for admission.  Patient was admitted a month back for COVID infection for which he received remdesivir for 3 days.  He is on doxycycline for chronic driveline infection.  He is on Milrinone for RV failure.  Patient denies having any low flow alarms on the pump.  Also denies having any lower extremity edema, increased discharge from his driveline site.  Patient has ground-glass opacities on imaging from outside hospital      * Sepsis  -Patient presents with elevated fevers, white blood cell count, elevated lactic acid.   -Possible Secondary to COVID-19 infection.  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 Repeated 7/29 with NGTD. Repeated again 7/30   -7/29, 7/30 negative   -Orders placed for PICC line    -Continue Ancef 8g IV q 24 hours for total of 6 weeks with end date 9/10  -[facility] D/C'd on 7/29  -Echo done 7/31 and no vegetations appreciated     Bacteremia due to Staphylococcus  -See  sepsis    Pleural effusion  -CT imaging this admission consistent with small left sided pleural effusion and complicated pleural around left heart border  -Pulm consulted- suspected para-pneumonic process vs empyema in setting of MSSA bacteremia  -Thoracic surgery consulted for VATS  -If not surgical candidate than will consult IR for CT guided chest tube placed in posterior collection.  -Will consider lytics     LVAD (left ventricular assist device) present  -HeartMate 3 Implanted on 6/29/2022 as DT with RV failure on milrinone at 0.25 mcg/kg/min.  -Continue Coumadin, Goal INR 2.0-3.0. therapeutic at 2.5 after coumadin held in setting of INR 3.4. Pharmacy to dose  -LDH is stable.  Will continue to monitor daily.  -Speed set at 5100, LSL 4700 rpm  -Echo: 7/31/23 EF: 10-15%, LVEDD: 6.87 cm.  No vegation appreciated     Procedure: Device Interrogation Including analysis of device parameters  Current Settings: Ventricular Assist Device  Review of device function is stable    TXP LVAD INTERROGATIONS 8/2/2023 8/2/2023 8/2/2023 8/1/2023 8/1/2023 8/1/2023 8/1/2023   Type HeartMate3 HeartMate3 HeartMate3 HeartMate3 HeartMate3 HeartMate3 HeartMate3   Flow 4.2 4.4 4.4 4.4 4.5 4.3 4.8   Speed 5100 5100 5100 5100 5100 5100 5100   PI 3.4 4.1 3.9 4.4 3.3 3.9 3.3   Power (Serna) 3.8 3.7 3.7 3.7 3.6 3.7 3.7   LSL 4700 4700 4700 4700 4700 4700 4700   Low Flow Alarm - - - - - - -   High Power Alarm - - - - - - -   Pulsatility Pulse Pulse - Pulse No Pulse Pulse Pulse       Chronic combined systolic and diastolic heart failure  -NICM  -Last 2D Echo 10/31/22: LVEF 10%, LVEDD 7.25 cm  -Euvolemic on examination today  -Current diuretic regimen:Lasix 80mg Qdaily  -Status post LVAD.  -GDMT: On Valsartan, Aldactone and Toprol at home. Valsartan 40 mg po bid resumed. K+ has trended up at 4.8 - D/C'd KCL 40 mEq bid. 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  Now with new gram + bacteremia  -On Cefaxolin   -ID following   -Repeat CT of C/A/P 8/1-prior stranding noted around DL site resolved    Type 2 diabetes mellitus with hyperglycemia  -Will keep him on sliding scale and a.c. HS    Seizure-like activity  -On on DEYA Irizarry  Heart Transplant  [facility] - Cardiology Stepdown

## 2023-08-02 NOTE — ASSESSMENT & PLAN NOTE
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  -Continue Coumadin, Goal INR 2.0-3.0. therapeutic at 2.5 after coumadin held in setting of INR 3.4. Pharmacy to dose  -LDH is stable.  Will continue to monitor daily.  -Speed set at 5100, LSL 4700 rpm  -Echo: 7/31/23 EF: 10-15%, LVEDD: 6.87 cm.  No vegation appreciated     Procedure: Device Interrogation Including analysis of device parameters  Current Settings: Ventricular Assist Device  Review of device function is stable    TXP LVAD INTERROGATIONS 8/2/2023 8/2/2023 8/2/2023 8/1/2023 8/1/2023 8/1/2023 8/1/2023   Type HeartMate3 HeartMate3 HeartMate3 HeartMate3 HeartMate3 HeartMate3 HeartMate3   Flow 4.2 4.4 4.4 4.4 4.5 4.3 4.8   Speed 5100 5100 5100 5100 5100 5100 5100   PI 3.4 4.1 3.9 4.4 3.3 3.9 3.3   Power (Serna) 3.8 3.7 3.7 3.7 3.6 3.7 3.7   LSL 4700 4700 4700 4700 4700 4700 4700   Low Flow Alarm - - - - - - -   High Power Alarm - - - - - - -   Pulsatility Pulse Pulse - Pulse No Pulse Pulse Pulse

## 2023-08-02 NOTE — SUBJECTIVE & OBJECTIVE
"Interval HPI:   Overnight events: No acute events overnight. Patient in room 314/314 A. Blood glucose stable. BG above goal on current insulin regimen (SSI, prandial, and basal insulin ). Steroid use- None.    Renal function- Normal   Vasopressors-  None       Endocrine will continue to follow and manage insulin orders inpatient.         Diet diabetic [facility]; 2800 Calorie     Eatin%  Nausea: No  Hypoglycemia and intervention: No  Fever: No  TPN and/or TF: No      BP 97/69 (BP Location: Left arm, Patient Position: Lying)   Pulse (!) 118   Temp 98.5 °F (36.9 °C) (Oral)   Resp 18   Ht 6' 3" (1.905 m)   Wt 90.3 kg (199 lb 1.2 oz)   SpO2 95%   BMI 24.88 kg/m²     Labs Reviewed and Include    Recent Labs   Lab 23  0604   *   CALCIUM 9.3   ALBUMIN 2.4*   PROT 7.4   *   K 4.3   CO2 22*      BUN 32*   CREATININE 1.4   ALKPHOS 137*   ALT 22   AST 39   BILITOT 0.7     Lab Results   Component Value Date    WBC 24.71 (H) 2023    HGB 11.1 (L) 2023    HCT 33.5 (L) 2023    MCV 82 2023     2023     No results for input(s): "TSH", "FREET4" in the last 168 hours.  Lab Results   Component Value Date    HGBA1C 6.7 (H) 2023       Nutritional status:   Body mass index is 24.88 kg/m².  Lab Results   Component Value Date    ALBUMIN 2.4 (L) 2023    ALBUMIN 2.4 (L) 2023    ALBUMIN 2.5 (L) 2023     Lab Results   Component Value Date    PREALBUMIN 29 2023    PREALBUMIN 26 2023    PREALBUMIN 18 (L) 2023       Estimated Creatinine Clearance: 85.5 mL/min (based on SCr of 1.4 mg/dL).    Accu-Checks  Recent Labs     23  1155 23  1636 23  1958 23  0311 23  0320 23  0756 23  1205 23  1644 23  1956 23  0756   POCTGLUCOSE 322* 221* 179* >500* 309* 222* 123* 230* 176* 180*       Current Medications and/or Treatments Impacting Glycemic Control  Immunotherapy:  "   Immunosuppressants       None          Steroids:   Hormones (From admission, onward)      None          Pressors:    Autonomic Drugs (From admission, onward)      None          Hyperglycemia/Diabetes Medications:   Antihyperglycemics (From admission, onward)      Start     Stop Route Frequency Ordered    08/02/23 1130  insulin aspart U-100 pen 20 Units         -- SubQ 3 times daily with meals 08/02/23 0829    08/01/23 2100  insulin detemir U-100 (Levemir) pen 24 Units         -- SubQ 2 times daily 08/01/23 1319    07/30/23 0907  insulin aspart U-100 pen 0-15 Units         -- SubQ Before meals, nightly and at 0200 PRN 07/30/23 0807        I

## 2023-08-02 NOTE — HPI
38 year-old male with history of combined systolic and diastolic heart failure, s/p LVAD placement, history of MSSA drive line infection of LVAD, T2DM. Patient notes that he originally presented with chest pain that was worse with breathing and worsened with leaning forward. His other symptoms he noted was having a fever. He denied having any worsening shortness of breath from his baseline. Patient was diagnosed with COVID during this admission on 7/22/23. Patient notes that since admission he has not had any more fevers or chills. He was found to have positive blood culture growing Staph on 7/28/23 and ID was consulted for management and started on IV Ancef. Patient notes he has history of MSSA bacteremia from his LVAD and was on a two week course of doxycycline. Patient was found to have a loculated effusion along the left heart border and with small left pleural effusion.    Pulmonology consulted for pleural effusion management. Bedside echo done by Dr. Thompson which showed very small pocket that appeared complicated with some evidence of stranding.

## 2023-08-02 NOTE — SUBJECTIVE & OBJECTIVE
Interval History: No fevers documented overnight. S/p CT scan with L sided loculated fluid. Denies new joint pain, has worsening L sided pleuritic chest pain today. He reports tolerating abx without issues.     Review of Systems  Objective:     Vital Signs (Most Recent):  Temp: 98.5 °F (36.9 °C) (08/02/23 0750)  Pulse: (!) 118 (08/02/23 0750)  Resp: 18 (08/02/23 0750)  BP: 97/69 (08/02/23 0752)  SpO2: 95 % (08/02/23 0750) Vital Signs (24h Range):  Temp:  [97.8 °F (36.6 °C)-98.6 °F (37 °C)] 98.5 °F (36.9 °C)  Pulse:  [108-126] 118  Resp:  [18-20] 18  SpO2:  [95 %-99 %] 95 %  BP: ()/(0-69) 97/69     Weight: 90.3 kg (199 lb 1.2 oz)  Body mass index is 24.88 kg/m².    Estimated Creatinine Clearance: 85.5 mL/min (based on SCr of 1.4 mg/dL).     Physical Exam  Constitutional:       General: He is not in acute distress.     Appearance: He is not ill-appearing or toxic-appearing.   HENT:      Head: Normocephalic and atraumatic.   Pulmonary:      Effort: Pulmonary effort is normal. No respiratory distress.      Comments: Decreased L basilar BS  Abdominal:      General: There is no distension.      Palpations: Abdomen is soft.      Tenderness: There is no abdominal tenderness.      Comments: DL - dressed; no tenderness or erythema   Skin:     General: Skin is warm and dry.      Comments: R picc   Neurological:      Mental Status: He is alert and oriented to person, place, and time.          Significant Labs:   Microbiology Results (last 7 days)       Procedure Component Value Units Date/Time    Blood culture [[id number]] Collected: 07/29/23 1130    Order Status: Completed Specimen: Blood Updated: 08/01/23 1412     Blood Culture, Routine No Growth to date      No Growth to date      No Growth to date      No Growth to date    Narrative:      Please obtain 2 sets after RIJ is pulled, thank you    Blood culture [[id number]] Collected: 07/29/23 1132    Order Status: Completed Specimen: Blood Updated: 08/01/23 1412     Blood  Culture, Routine No Growth to date      No Growth to date      No Growth to date      No Growth to date    Narrative:      Please obtain 2 sets after RIJ is pulled, thank you  Rt hand    Blood culture [[id number]] Collected: 07/30/23 0810    Order Status: Completed Specimen: Blood Updated: 08/01/23 1012     Blood Culture, Routine No Growth to date      No Growth to date      No Growth to date    Blood culture [[id number]] Collected: 07/30/23 0810    Order Status: Completed Specimen: Blood Updated: 08/01/23 1012     Blood Culture, Routine No Growth to date      No Growth to date      No Growth to date    Blood culture [[id number]]  (Abnormal) Collected: 07/28/23 0940    Order Status: Completed Specimen: Blood from Peripheral, Left Hand Updated: 08/01/23 0834     Blood Culture, Routine Gram stain aer bottle: Gram positive cocci in clusters resembling Staph      Positive results previously called 07/30/2023  13:09      STAPHYLOCOCCUS AUREUS  ID consult required at [facility].  For susceptibility see order #[id number]      Blood culture [[id number]]  (Abnormal)  (Susceptibility) Collected: 07/28/23 0939    Order Status: Completed Specimen: Blood from Antecubital, Left Arm Updated: 07/31/23 1026     Blood Culture, Routine Gram stain aer bottle: Gram positive cocci in clusters resembling Staph      Positive results previously called 07/29/2023  21:20      STAPHYLOCOCCUS AUREUS  ID consult required at [facility] and [facility].      Blood culture [[id number]] Collected: 07/25/23 0450    Order Status: Completed Specimen: Blood Updated: 07/30/23 0612     Blood Culture, Routine No growth after 5 days.    Blood culture [[id number]]  (Abnormal)  (Susceptibility) Collected: 07/25/23 0451    Order Status: Completed Specimen: Blood Updated: 07/29/23 1045     Blood Culture, Routine Gram stain aer bottle: Gram positive cocci in clusters resembling Staph      Results called to and read back  by:Laura Kilgore RN 07/27/2023  13:01      STAPHYLOCOCCUS AUREUS  ID consult required at [facility].[place] [facility] [place] locations.      MRSA/SA Rapid ID by PCR from Blood culture [[id number]]  (Abnormal) Collected: 07/25/23 0451    Order Status: Completed Updated: 07/27/23 1438     Staph aureus ID by PCR Positive     Methicillin Resistant ID by PCR Negative    Blood culture [[id number]]  (Abnormal)  (Susceptibility) Collected: 07/23/23 1305    Order Status: Completed Specimen: Blood from Line, Jugular, Internal Right Updated: 07/26/23 1120     Blood Culture, Routine Gram stain aer bottle: Gram positive cocci in clusters resembling Staph      Results called to and read back by:Cong Urena RN 07/24/2023  16:05      STAPHYLOCOCCUS AUREUS  ID consult required at [facility].[place] and [place] locations.              Significant Imaging: I have reviewed all pertinent imaging results/findings within the past 24 hours.

## 2023-08-02 NOTE — PROGRESS NOTES
[facility] - Cardiology Stepdown  Endocrinology  Progress Note    Admit Date: 7/22/2023     Reason for Consult: Management of T2DM, Hyperglycemia     Surgical Procedure and Date: HM3 implantation 6/23/2022    Diabetes diagnosis year:  2022    Home Diabetes Medications:  Levemir 22 units BID, Novolog 16 units TID with meals in addition to SSI (151-200/+1)     How often checking glucose at home? Freestyle William    BG readings on regimen: 170-low 200s  Hypoglycemia on the regimen?  No  Missed doses on regimen?  Yes    Diabetes Complications include:     Hyperglycemia     Complicating diabetes co morbidities:   History of MI, CHF, and CKD         HPI:   Patient is a 38 y.o. 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 He underwent RVAD removal and chest closure 6/30/2022.  He was weaned off  but he had to restarted due to RVF. He was eventually transitioned to milrinone (secondary to  shortage) now on 0.25 mcg/kg/min.  Patient also has history of driveline infection.  Patient presented to the emergency room today because of pleuritic chest pain that has been going on for the last 3 days however it was more intense today.  Also complains of having shortness of breath and fevers associated with it.  He was spiking fever of 102 in the emergency room with elevated white blood cell count up to 17,000. He was treated with Zosyn.  He was sent here for admission.  Patient was admitted a month back for COVID infection for which he received remdesivir for 3 days. Patient has ground-glass opacities on imaging from outside hospital. Endocrinology consulted for management of T2DM.      Lab Results   Component Value Date    HGBA1C 6.7 (H) 07/22/2023           Interval HPI:   Overnight events: No acute events overnight.  "Patient in room 314/314 A. Blood glucose stable. BG above goal on current insulin regimen (SSI, prandial, and basal insulin ). Steroid use- None.    Renal function- Normal   Vasopressors-  None       Endocrine will continue to follow and manage insulin orders inpatient.         Diet diabetic [facility]; 2800 Calorie     Eatin%  Nausea: No  Hypoglycemia and intervention: No  Fever: No  TPN and/or TF: No      BP 97/69 (BP Location: Left arm, Patient Position: Lying)   Pulse (!) 118   Temp 98.5 °F (36.9 °C) (Oral)   Resp 18   Ht 6' 3" (1.905 m)   Wt 90.3 kg (199 lb 1.2 oz)   SpO2 95%   BMI 24.88 kg/m²     Labs Reviewed and Include    Recent Labs   Lab 23  0604   *   CALCIUM 9.3   ALBUMIN 2.4*   PROT 7.4   *   K 4.3   CO2 22*      BUN 32*   CREATININE 1.4   ALKPHOS 137*   ALT 22   AST 39   BILITOT 0.7     Lab Results   Component Value Date    WBC 24.71 (H) 2023    HGB 11.1 (L) 2023    HCT 33.5 (L) 2023    MCV 82 2023     2023     No results for input(s): "TSH", "FREET4" in the last 168 hours.  Lab Results   Component Value Date    HGBA1C 6.7 (H) 2023       Nutritional status:   Body mass index is 24.88 kg/m².  Lab Results   Component Value Date    ALBUMIN 2.4 (L) 2023    ALBUMIN 2.4 (L) 2023    ALBUMIN 2.5 (L) 2023     Lab Results   Component Value Date    PREALBUMIN 29 2023    PREALBUMIN 26 2023    PREALBUMIN 18 (L) 2023       Estimated Creatinine Clearance: 85.5 mL/min (based on SCr of 1.4 mg/dL).    Accu-Checks  Recent Labs     23  1155 23  1636 23  1958 23  0311 23  0320 23  0756 23  1205 23  1644 23  1956 23  0756   POCTGLUCOSE 322* 221* 179* >500* 309* 222* 123* 230* 176* 180*       Current Medications and/or Treatments Impacting Glycemic Control  Immunotherapy:    Immunosuppressants       None          Steroids:   Hormones (From " admission, onward)      None          Pressors:    Autonomic Drugs (From admission, onward)      None          Hyperglycemia/Diabetes Medications:   Antihyperglycemics (From admission, onward)      Start     Stop Route Frequency Ordered    08/02/23 1130  insulin aspart U-100 pen 20 Units         -- SubQ 3 times daily with meals 08/02/23 0829    08/01/23 2100  insulin detemir U-100 (Levemir) pen 24 Units         -- SubQ 2 times daily 08/01/23 1319    07/30/23 0907  insulin aspart U-100 pen 0-15 Units         -- SubQ Before meals, nightly and at 0200 PRN 07/30/23 0807        I      ASSESSMENT and PLAN    Cardiac/Vascular  LVAD (left ventricular assist device) present  Managed per primary team  Avoid hypoglycemia        ID  * Sepsis  Managed per primary team        Endocrine  Type 2 diabetes mellitus with hyperglycemia  Endocrinology consulted for BG management.   BG goal 140-180    - Levemir (Insulin Detemir) 24 units BID (20% reduction due to steroid taper.)  - Novolog (Insulin Aspart) 20 units TIDWM and prn for BG excursions MDC SSI (150/25) (20% reduction due to steroid taper.)  - BG checks AC/HS/0200  - Hypoglycemia protocol in place  - If blood glucose greater than 300, please ask patient not to eat food or drink anything other than water until correctional insulin has brought it back below 250    ** Please notify Endocrine for any change and/or advance in diet**  ** Please call Endocrine for any BG related issues **    Discharge Planning:   TBD. Please notify endocrinology prior to discharge.           Michael Wyman, DNP, FNP  Endocrinology  [facility] - [facility]

## 2023-08-02 NOTE — ASSESSMENT & PLAN NOTE
Endocrinology consulted for BG management.   BG goal 140-180    - Levemir (Insulin Detemir) 24 units BID (20% reduction due to steroid taper.)  - Novolog (Insulin Aspart) 20 units TIDWM and prn for BG excursions MDC SSI (150/25) (20% reduction due to steroid taper.)  - BG checks //0200  - Hypoglycemia protocol in place  - If blood glucose greater than 300, please ask patient not to eat food or drink anything other than water until correctional insulin has brought it back below 250    ** Please notify Endocrine for any change and/or advance in diet**  ** Please call Endocrine for any BG related issues **    Discharge Planning:   TBD. Please notify endocrinology prior to discharge.

## 2023-08-02 NOTE — PROGRESS NOTES
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 37 yo male with LVAD c/b prior MSSA DLESI s/p treatment admitted with MSSA bacteremia 2/2 to DLES. Admit blcx and repeat DLES cx with MSSA; prior PICC removed. Also found to have COVID, CXR with L >R opacity; s/p remdesivir and dex suspect leukocytosis 2/2 to steroids. Denies new joint/back pain or diarrhea. CVC (placed during hospital stay) removed on 7/29, repeat blcx post-line removal ngtd. TTE unrevealing for vegetations. Recent CXR with stable L>R opacity with pleural effusion.  CT c/a/p noted to have L loculated effusion (lateral heart border and base); prior stranding noted around DL site resolved.     Recommendations:  -continue ancef 8g CI IV daily  -will likely need suppressive abx therapy once pt completes his course of IV abx  -follow up repeat blcx, so far ngtd  -consult pulm for further evaluation of effusion/if amenable for drainage                      Thank you for your consult. I will follow-up with patient. Please contact us if you have any additional questions. Above d/w primary and pulm team.     Laura Baldwin MD  Infectious Disease  [facility] - Cardiology Stepdown    Subjective:     Principal Problem:Sepsis    HPI: 38 year old male with a history of CHF s/p LVAD placement in June of 2022.  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  He was apparently on room air for most of that hospital stay.  He received 3 days of remdesivir.  He  was discharged on oral doxycycline to complete a 14 day course for the MSSA drive line infection.  He had tested negative for COVID-19 following his treatment.  He is re-admitted with complaints of shortness of breath, chest pains and generalized ill feeling. COVID-19 testing is positive again.  Chest x-ray shows diffuse infiltrates bilaterally.  ID is consulted to assist with his management.      Interval History: No fevers documented overnight. S/p CT scan with L sided loculated fluid. Denies new joint pain, has worsening L sided pleuritic chest pain today. He reports tolerating abx without issues.     Review of Systems  Objective:     Vital Signs (Most Recent):  Temp: 98.5 °F (36.9 °C) (08/02/23 0750)  Pulse: (!) 118 (08/02/23 0750)  Resp: 18 (08/02/23 0750)  BP: 97/69 (08/02/23 0752)  SpO2: 95 % (08/02/23 0750) Vital Signs (24h Range):  Temp:  [97.8 °F (36.6 °C)-98.6 °F (37 °C)] 98.5 °F (36.9 °C)  Pulse:  [108-126] 118  Resp:  [18-20] 18  SpO2:  [95 %-99 %] 95 %  BP: ()/(0-69) 97/69     Weight: 90.3 kg (199 lb 1.2 oz)  Body mass index is 24.88 kg/m².    Estimated Creatinine Clearance: 85.5 mL/min (based on SCr of 1.4 mg/dL).     Physical Exam  Constitutional:       General: He is not in acute distress.     Appearance: He is not ill-appearing or toxic-appearing.   HENT:      Head: Normocephalic and atraumatic.   Pulmonary:      Effort: Pulmonary effort is normal. No respiratory distress.      Comments: Decreased L basilar BS  Abdominal:      General: There is no distension.      Palpations: Abdomen is soft.      Tenderness: There is no abdominal tenderness.      Comments: DL - dressed; no tenderness or erythema   Skin:     General: Skin is warm and dry.      Comments: R picc   Neurological:      Mental Status: He is alert and oriented to person, place, and time.          Significant Labs:   Microbiology Results (last 7 days)       Procedure Component Value Units Date/Time    Blood culture [[id number]]  Collected: 07/29/23 1130    Order Status: Completed Specimen: Blood Updated: 08/01/23 1412     Blood Culture, Routine No Growth to date      No Growth to date      No Growth to date      No Growth to date    Narrative:      Please obtain 2 sets after RIJ is pulled, thank you    Blood culture [[id number]] Collected: 07/29/23 1132    Order Status: Completed Specimen: Blood Updated: 08/01/23 1412     Blood Culture, Routine No Growth to date      No Growth to date      No Growth to date      No Growth to date    Narrative:      Please obtain 2 sets after RIJ is pulled, thank you  Rt hand    Blood culture [[id number]] Collected: 07/30/23 0810    Order Status: Completed Specimen: Blood Updated: 08/01/23 1012     Blood Culture, Routine No Growth to date      No Growth to date      No Growth to date    Blood culture [[id number]] Collected: 07/30/23 0810    Order Status: Completed Specimen: Blood Updated: 08/01/23 1012     Blood Culture, Routine No Growth to date      No Growth to date      No Growth to date    Blood culture [[id number]]  (Abnormal) Collected: 07/28/23 0940    Order Status: Completed Specimen: Blood from Peripheral, Left Hand Updated: 08/01/23 0834     Blood Culture, Routine Gram stain aer bottle: Gram positive cocci in clusters resembling Staph      Positive results previously called 07/30/2023  13:09      STAPHYLOCOCCUS AUREUS  ID consult required at [facility].  For susceptibility see order #[id number]      Blood culture [[id number]]  (Abnormal)  (Susceptibility) Collected: 07/28/23 0939    Order Status: Completed Specimen: Blood from Antecubital, Left Arm Updated: 07/31/23 1026     Blood Culture, Routine Gram stain aer bottle: Gram positive cocci in clusters resembling Staph      Positive results previously called 07/29/2023  21:20      STAPHYLOCOCCUS AUREUS  ID consult required at [facility].      Blood culture [[id number]] Collected: 07/25/23 0450     Order Status: Completed Specimen: Blood Updated: 07/30/23 0612     Blood Culture, Routine No growth after 5 days.    Blood culture [[id number]]  (Abnormal)  (Susceptibility) Collected: 07/25/23 0451    Order Status: Completed Specimen: Blood Updated: 07/29/23 1045     Blood Culture, Routine Gram stain aer bottle: Gram positive cocci in clusters resembling Staph      Results called to and read back by:Laura Kilgore RN 07/27/2023  13:01      STAPHYLOCOCCUS AUREUS  ID consult required at [facility] and [facility] locations.      MRSA/SA Rapid ID by PCR from Blood culture [[id number]]  (Abnormal) Collected: 07/25/23 0451    Order Status: Completed Updated: 07/27/23 1438     Staph aureus ID by PCR Positive     Methicillin Resistant ID by PCR Negative    Blood culture [[id number]]  (Abnormal)  (Susceptibility) Collected: 07/23/23 1305    Order Status: Completed Specimen: Blood from Line, Jugular, Internal Right Updated: 07/26/23 1120     Blood Culture, Routine Gram stain aer bottle: Gram positive cocci in clusters resembling Staph      Results called to and read back by:Cong Urena RN 07/24/2023  16:05      STAPHYLOCOCCUS AUREUS  [facility].              Significant Imaging: I have reviewed all pertinent imaging results/findings within the past 24 hours.

## 2023-08-02 NOTE — PROGRESS NOTES
08/02/2023  Ruma Li    Current provider:  Marlo Marques MD    Device interrogation:  TXP LVAD INTERROGATIONS 8/2/2023 8/2/2023 8/1/2023 8/1/2023 8/1/2023 8/1/2023 8/1/2023   Type HeartMate3 HeartMate3 HeartMate3 HeartMate3 HeartMate3 HeartMate3 HeartMate3   Flow 4.4 4.4 4.4 4.5 4.3 4.8 4.1   Speed 5100 5100 5100 5100 5100 5100 5100   PI 4.1 3.9 4.4 3.3 3.9 3.3 5.6   Power (Serna) 3.7 3.7 3.7 3.6 3.7 3.7 3.7   LSL 4700 4700 4700 4700 4700 4700 4700   Low Flow Alarm - - - - - - -   High Power Alarm - - - - - - -   Pulsatility Pulse - Pulse No Pulse Pulse Pulse Pulse          Rounded on Kevan Queen to ensure all mechanical assist device settings (IABP or VAD) were appropriate and all parameters were within limits.  I was able to ensure all back up equipment was present, the staff had no issues, and the Perfusion Department daily rounding was complete.      For implantable VADs: Interrogation of Ventricular assist device was performed with analysis of device parameters and review of device function. I have personally reviewed the interrogation findings and agree with findings as stated.     In emergency, the nursing units have been notified to contact the perfusion department either by:  Calling [id number] from 630am to 4pm Mon thru Fri, utilizing the On-Call Finder functionality of Epic and searching for Perfusion, or by contacting the hospital  from 4pm to 630am and on weekends and asking to speak with the perfusionist on call.    12:14 PM

## 2023-08-02 NOTE — HPI
Kevan Queen is a 38 y.o. gentleman with history of heart failure s/p LVAD in 2022, chronic MSSA drive line infection, and DM who is admitted with COVID. He had originally presented with chest pain associated with fever for 3 days prior to admission but denies shortness of breath. No sick contacts. Last + blood culture on 7/28. CT was performed which showed a left sided pleural effusion that is mostly posterior, but has an anterior component. Unclear if these pockets communicate but they appear to be simple. CT surgery consulted for evaluation for VATS for management of pleural effusion.

## 2023-08-02 NOTE — PLAN OF CARE
Pt free of falls and injury. Pt c/o severe pain and PRN pain meds given and helped. Pt AAOx4. Fall precautions remain in place. LVAD hum present and smooth. VAD numbers and dopplers WDL. DLES dressing CDI. BG monitored and insulin given as ordered. Ancef going at 20.8 mL/hr and milrinone going at 0.25 mcg/kg/min. Plan of care reviewed with pt. Will continue to monitor pt.

## 2023-08-02 NOTE — SUBJECTIVE & OBJECTIVE
**Interval History: PICC line placed yesterday.  CT of chest with contrast done yesterday and found to have loculated effusion along the left heart border and with small left pleural effusion.  Pulm consulted and Thoracic surgery consult for VATS.  If not candidate for VATS then will consult IR for pigtail catheter and can tx with lytics once daily.      Continuous Infusions:   milrinone 20mg/100ml D5W (200mcg/ml) 0.25 mcg/kg/min (08/01/23 2332)     Scheduled Meds:   aspirin  81 mg Oral Daily    busPIRone  10 mg Oral BID    ceFAZolin (ANCEF) 8 g in dextrose 5 % (D5W) 500 mL CONTINUOUS INFUSION  8 g Intravenous Q24H    furosemide  80 mg Oral Daily    insulin aspart U-100  20 Units Subcutaneous TIDWM    insulin detemir U-100  24 Units Subcutaneous BID    levETIRAcetam  1,000 mg Oral BID    mirtazapine  15 mg Oral Nightly    polyethylene glycol  17 g Oral Daily    senna-docusate 8.6-50 mg  2 tablet Oral BID    sodium chloride 0.9%  10 mL Intravenous Q6H    valsartan  40 mg Oral BID    warfarin  4.5 mg Oral Daily     PRN Meds:acetaminophen, dextrose 10%, dextrose 10%, glucagon (human recombinant), glucose, glucose, HYDROcodone-acetaminophen, insulin aspart U-100, Flushing PICC Protocol **AND** sodium chloride 0.9% **AND** sodium chloride 0.9%    Review of patient's allergies indicates:   Allergen Reactions    Bumex [bumetanide] Hives    Torsemide Hives     Objective:     Vital Signs (Most Recent):  Temp: 98.2 °F (36.8 °C) (08/02/23 1150)  Pulse: (!) 118 (08/02/23 1219)  Resp: 18 (08/02/23 1150)  BP: (!) 82/59 (08/02/23 1152)  SpO2: 99 % (08/02/23 1150) Vital Signs (24h Range):  Temp:  [98.1 °F (36.7 °C)-98.6 °F (37 °C)] 98.2 °F (36.8 °C)  Pulse:  [108-126] 118  Resp:  [18-20] 18  SpO2:  [95 %-99 %] 99 %  BP: ()/(0-69) 82/59     Patient Vitals for the past 72 hrs (Last 3 readings):   Weight   08/02/23 0750 90.3 kg (199 lb 1.2 oz)   08/01/23 0750 90.7 kg (199 lb 15.3 oz)   07/31/23 1450 90.3 kg (199 lb)       Body  mass index is 24.88 kg/m².      Intake/Output Summary (Last 24 hours) at 8/2/2023 1232  Last data filed at 8/2/2023 1216  Gross per 24 hour   Intake 600 ml   Output 2800 ml   Net -2200 ml         Hemodynamic Parameters:       Telemetry: ST       Physical Exam  Constitutional:       Appearance: Normal appearance.   HENT:      Head: Normocephalic and atraumatic.   Eyes:      Conjunctiva/sclera: Conjunctivae normal.      Pupils: Pupils are equal, round, and reactive to light.   Neck:      Comments: Do not appreciate elevated neck veins  Cardiovascular:      Rate and Rhythm: Regular rhythm. Tachycardia present.      Comments: Smooth VAD hum  Pulmonary:      Effort: Pulmonary effort is normal.      Breath sounds: Normal breath sounds.   Abdominal:      General: Bowel sounds are normal.      Palpations: Abdomen is soft.   Musculoskeletal:         General: No swelling. Normal range of motion.      Cervical back: Normal range of motion and neck supple.   Skin:     General: Skin is warm and dry.      Capillary Refill: Capillary refill takes 2 to 3 seconds.   Neurological:      General: No focal deficit present.      Mental Status: He is alert and oriented to person, place, and time.   Psychiatric:         Mood and Affect: Mood normal.         Behavior: Behavior normal.         Thought Content: Thought content normal.         Judgment: Judgment normal.            Significant Labs:  CBC:  Recent Labs   Lab 07/31/23  0501 08/01/23  0342 08/02/23  0604   WBC 35.42* 34.78* 24.71*   RBC 4.32* 3.97* 4.09*   HGB 11.7* 10.9* 11.1*   HCT 36.1* 32.8* 33.5*   * 510* 417   MCV 84 83 82   MCH 27.1 27.5 27.1   MCHC 32.4 33.2 33.1       BNP:  Recent Labs   Lab 07/28/23  0431 07/31/23  0501 08/02/23  0604   * 159* 82       CMP:  Recent Labs   Lab 07/31/23  0501 08/01/23  0342 08/02/23  0604   * 328* 184*   CALCIUM 9.9 9.5 9.3   ALBUMIN 2.5* 2.4* 2.4*   PROT 8.1 7.5 7.4   * 132* 135*   K 4.5 4.7 4.3   CO2 25 22* 22*    CL 97 100 104   BUN 36* 40* 32*   CREATININE 1.4 1.3 1.4   ALKPHOS 150* 151* 137*   ALT 32 31 22   AST 39 55* 39   BILITOT 0.7 0.5 0.7        Coagulation:   Recent Labs   Lab 07/30/23  0545 07/31/23  0501 08/01/23  0716 08/02/23  0604   INR 2.6* 3.4* 2.5* 1.8*  1.8*   APTT 33.8* 33.8*  --  30.6       LDH:  Recent Labs   Lab 07/31/23  0501 08/01/23  0342 08/02/23  0604   * 410* 359*       Microbiology:  Microbiology Results (last 7 days)       Procedure Component Value Units Date/Time    Blood culture [[id number]] Collected: 07/30/23 0810    Order Status: Completed Specimen: Blood Updated: 08/02/23 1012     Blood Culture, Routine No Growth to date      No Growth to date      No Growth to date      No Growth to date    Blood culture [[id number]] Collected: 07/30/23 0810    Order Status: Completed Specimen: Blood Updated: 08/02/23 1012     Blood Culture, Routine No Growth to date      No Growth to date      No Growth to date      No Growth to date    Blood culture [[id number]] Collected: 07/29/23 1130    Order Status: Completed Specimen: Blood Updated: 08/01/23 1412     Blood Culture, Routine No Growth to date      No Growth to date      No Growth to date      No Growth to date    Narrative:      Please obtain 2 sets after RIJ is pulled, thank you    Blood culture [[id number]] Collected: 07/29/23 1132    Order Status: Completed Specimen: Blood Updated: 08/01/23 1412     Blood Culture, Routine No Growth to date      No Growth to date      No Growth to date      No Growth to date    Narrative:      Please obtain 2 sets after RIJ is pulled, thank you  Rt hand    Blood culture [[id number]]  (Abnormal) Collected: 07/28/23 0940    Order Status: Completed Specimen: Blood from Peripheral, Left Hand Updated: 08/01/23 0834     Blood Culture, Routine Gram stain aer bottle: Gram positive cocci in clusters resembling Staph      Positive results previously called 07/30/2023  13:09      STAPHYLOCOCCUS AUREUS  ID consult required  at [facility].  For susceptibility see order #[id number]      Blood culture [[id number]]  (Abnormal)  (Susceptibility) Collected: 07/28/23 0939    Order Status: Completed Specimen: Blood from Antecubital, Left Arm Updated: 07/31/23 1026     Blood Culture, Routine Gram stain aer bottle: Gram positive cocci in clusters resembling Staph      Positive results previously called 07/29/2023  21:20      STAPHYLOCOCCUS AUREUS  ID consult required at [facility] and [facility].      Blood culture [[id number]] Collected: 07/25/23 0450    Order Status: Completed Specimen: Blood Updated: 07/30/23 0612     Blood Culture, Routine No growth after 5 days.    Blood culture [[id number]]  (Abnormal)  (Susceptibility) Collected: 07/25/23 0451    Order Status: Completed Specimen: Blood Updated: 07/29/23 1045     Blood Culture, Routine Gram stain aer bottle: Gram positive cocci in clusters resembling Staph      Results called to and read back by:Laura Kilgore RN 07/27/2023  13:01      STAPHYLOCOCCUS AUREUS  ID consult required at [facility] and [facility] locations.      MRSA/SA Rapid ID by PCR from Blood culture [[id number]]  (Abnormal) Collected: 07/25/23 0451    Order Status: Completed Updated: 07/27/23 1438     Staph aureus ID by PCR Positive     Methicillin Resistant ID by PCR Negative            I have reviewed all pertinent labs within the past 24 hours.    Estimated Creatinine Clearance: 85.5 mL/min (based on SCr of 1.4 mg/dL).    Diagnostic Results:  I have reviewed and interpreted all pertinent imaging results/findings within the past 24 hours.

## 2023-08-02 NOTE — CONSULTS
[facility] - Cardiology Stepdown  Pulmonology  Consult Note    Patient Name: Kevan Queen  MRN: [id number]  Admission Date: 7/22/2023  Hospital Length of Stay: 11 days  Code Status: Full Code  Attending Physician: Marlo Marques MD  Primary Care Provider: ORALIA Cline   Principal Problem: Sepsis    Inpatient consult to Pulmonology  Consult performed by: Prasad Rouse MD  Consult ordered by: DEYA Martinez        Subjective:     HPI:  38 year-old male with history of combined systolic and diastolic heart failure, s/p LVAD placement, history of MSSA drive line infection of LVAD, T2DM. Patient notes that he originally presented with chest pain that was worse with breathing and worsened with leaning forward. His other symptoms he noted was having a fever. He denied having any worsening shortness of breath from his baseline. Patient was diagnosed with COVID during this admission on 7/22/23. Patient notes that since admission he has not had any more fevers or chills. He was found to have positive blood culture growing Staph on 7/28/23 and ID was consulted for management and started on IV Ancef. Patient notes he has history of MSSA bacteremia from his LVAD and was on a two week course of doxycycline. Patient was found to have a loculated effusion along the left heart border and with small left pleural effusion.    Pulmonology consulted for pleural effusion management. Bedside echo done by Dr Damian which showed very small pocket that appeared complicated with some evidence of stranding.       Past Medical History:   Diagnosis Date    Acute respiratory failure with hypoxia 7/22/2023    Arthritis     Awareness alteration, transient 9/1/2022    Cardiomyopathy     CHF (congestive heart failure) 10/01/2020    COVID-19 6/3/2023    Diabetes mellitus     Dilated cardiomyopathy 10/26/2020    Drug abuse 10/2020    Headache 4/19/2023    Hyperglycemia 12/16/2022    Hyperosmolar hyperglycemic state (HHS)  5/25/2022    ICD (implantable cardioverter-defibrillator) in place 10/26/2020    Left ventricular assist device (LVAD) complication, initial encounter 6/5/2023    Muscle cramping 6/15/2022    Renal disorder     SOB (shortness of breath) 6/13/2022    Tingling in extremities 7/13/2022       Past Surgical History:   Procedure Laterality Date    APPLICATION OF WOUND VACUUM-ASSISTED CLOSURE DEVICE N/A 6/30/2022    Procedure: APPLICATION, WOUND VAC;  Surgeon: Luis F Paige MD;  [facility];  Service: Cardiovascular;  Laterality: N/A;  50 x 5 cm    CARDIAC DEFIBRILLATOR PLACEMENT      IMPLANTATION OF RIGHT VENTRICULAR ASSIST DEVICE (RVAD) N/A 6/29/2022    Procedure: INSERTION, RVAD;  Surgeon: Luis F Paige MD;  [facility];  Service: Cardiovascular;  Laterality: N/A;    INSERTION OF GRAFT TO PERICARDIUM Right 6/30/2022    Procedure: INSERTION-RIGHT VENTRICULAR ASSIST DEVICE;  Surgeon: Luis F Paige MD;  [facility];  Service: Cardiovascular;  Laterality: Right;    IRRIGATION OF MEDIASTINUM  6/30/2022    Procedure: IRRIGATION, MEDIASTINUM;  Surgeon: Luis F Paige MD;  [facility];  Service: Cardiovascular;;    LEFT VENTRICULAR ASSIST DEVICE Left 6/23/2022    Procedure: INSERTION-LEFT VENTRICULAR ASSIST DEVICE;  Surgeon: Luis F Paige MD;  [facility];  Service: Cardiovascular;  Laterality: Left;    LEFT VENTRICULAR ASSIST DEVICE N/A 6/29/2022    Procedure: INSERTION-LEFT VENTRICULAR ASSIST DEVICE;  Surgeon: Luis F Paige MD;  [facility];  Service: Cardiovascular;  Laterality: N/A;    RIGHT HEART CATHETERIZATION Right 4/8/2022    Procedure: INSERTION, CATHETER, RIGHT HEART;  Surgeon: Luca Lopez Jr., MD;  Location: [facility] CATH LAB;  Service: Cardiology;  Laterality: Right;    RIGHT HEART CATHETERIZATION Right 4/19/2022    Procedure: INSERTION, CATHETER, RIGHT HEART;  Surgeon: Josh Pulido MD;  Location:  [facility] CATH LAB;  Service: Cardiology;  Laterality: Right;    RIGHT HEART CATHETERIZATION Right 2022    Procedure: INSERTION, CATHETER, RIGHT HEART;  Surgeon: Dalia Crum MD;  Location: [facility] CATH LAB;  Service: Cardiology;  Laterality: Right;    RIGHT HEART CATHETERIZATION Right 10/31/2022    Procedure: INSERTION, CATHETER, RIGHT HEART;  Surgeon: Dalia Crum MD;  Location: [facility] CATH LAB;  Service: Cardiology;  Laterality: Right;    STERNAL WOUND CLOSURE N/A 2022    Procedure: CLOSURE, WOUND, STERNUM;  Surgeon: Luis F Paige MD;  Location: [facility] OR [facility] FLR;  Service: Cardiovascular;  Laterality: N/A;       Review of patient's allergies indicates:   Allergen Reactions    Bumex [bumetanide] Hives    Torsemide Hives       Family History       Problem Relation (Age of Onset)    Diverticulosis Brother    Heart attack Maternal Grandmother, Maternal Grandfather    Heart failure Father          Tobacco Use    Smoking status: Former     Current packs/day: 0.00     Average packs/day: 0.5 packs/day for 16.6 years (8.3 ttl pk-yrs)     Types: Cigarettes     Start date: 10/1/2004     Quit date: 2021     Years since quittin.2    Smokeless tobacco: Never   Substance and Sexual Activity    Alcohol use: Not Currently    Drug use: Not Currently     Types: Marijuana, MDMA (Ecstacy)    Sexual activity: Yes     Partners: Female     Birth control/protection: None         Review of Systems   Constitutional:  Negative for chills, fatigue and fever.   Respiratory:  Negative for cough, chest tightness and shortness of breath (has exertional shortness of breath at baseline, but none currently.).    Cardiovascular:  Positive for chest pain (worse with leaning forward). Negative for leg swelling.   Gastrointestinal:  Negative for abdominal pain, nausea and vomiting.   Skin: Negative.    Neurological:  Negative for dizziness and light-headedness.   Psychiatric/Behavioral:  Negative for agitation, behavioral  problems and confusion.      Objective:     Vital Signs (Most Recent):  Temp: 98.5 °F (36.9 °C) (08/02/23 0750)  Pulse: (!) 118 (08/02/23 0750)  Resp: 18 (08/02/23 0750)  BP: 97/69 (08/02/23 0752)  SpO2: 95 % (08/02/23 0750) Vital Signs (24h Range):  Temp:  [97.8 °F (36.6 °C)-98.6 °F (37 °C)] 98.5 °F (36.9 °C)  Pulse:  [108-126] 118  Resp:  [18-20] 18  SpO2:  [95 %-99 %] 95 %  BP: ()/(0-69) 97/69     Weight: 90.3 kg (199 lb 1.2 oz)  Body mass index is 24.88 kg/m².      Intake/Output Summary (Last 24 hours) at 8/2/2023 1025  Last data filed at 8/2/2023 1020  Gross per 24 hour   Intake 600 ml   Output 2600 ml   Net -2000 ml        Physical Exam  Vitals reviewed.   Constitutional:       General: He is not in acute distress.     Appearance: Normal appearance.   HENT:      Head: Normocephalic and atraumatic.      Right Ear: External ear normal.      Left Ear: External ear normal.   Eyes:      General:         Right eye: No discharge.         Left eye: No discharge.      Conjunctiva/sclera: Conjunctivae normal.   Cardiovascular:      Heart sounds: Normal heart sounds.      No friction rub (difficult to assess with LVAD hum).      Comments: LVAD hum noted on exam  Pulmonary:      Effort: Pulmonary effort is normal. No respiratory distress.      Breath sounds: Normal breath sounds. No wheezing or rales.   Abdominal:      General: Abdomen is flat. There is no distension.   Musculoskeletal:      Cervical back: Normal range of motion.      Right lower leg: No edema.      Left lower leg: No edema.   Skin:     General: Skin is warm and dry.   Neurological:      Mental Status: He is alert and oriented to person, place, and time. Mental status is at baseline.   Psychiatric:         Mood and Affect: Mood normal.         Behavior: Behavior normal.          Vents:       Lines/Drains/Airways       Peripherally Inserted Central Catheter Line  Duration             PICC Double Lumen 08/01/23 1121 right basilic <1 day               Line  Duration                  VAD 06/29/22 1115 Left ventricular assist device HeartMate 3 398 days                    Significant Labs:    CBC/Anemia Profile:  Recent Labs   Lab 08/01/23  0342 08/02/23  0604   WBC 34.78* 24.71*   HGB 10.9* 11.1*   HCT 32.8* 33.5*   * 417   MCV 83 82   RDW 24.3* 24.0*        Chemistries:  Recent Labs   Lab 08/01/23  0342 08/02/23  0604   * 135*   K 4.7 4.3    104   CO2 22* 22*   BUN 40* 32*   CREATININE 1.3 1.4   CALCIUM 9.5 9.3   ALBUMIN 2.4* 2.4*   PROT 7.5 7.4   BILITOT 0.5 0.7   ALKPHOS 151* 137*   ALT 31 22   AST 55* 39   MG 2.0 1.9   PHOS 3.3 3.9       All pertinent labs within the past 24 hours have been reviewed.    Significant Imaging:   I have reviewed all pertinent imaging results/findings within the past 24 hours.    Assessment/Plan:     Pulmonary  Pleural effusion  38 year-old male with LVAD, recent COVID PNA s/p treatment, staph bacteremia that presented with elements of pleuritis, fever found to have CT imaging this admission consistent with small left sided pleural effusion and complicated pleural around left heart border. Given history of LVAD and known bacteremia recommend having the fluid drained. Bedside ultrasound showed small pleural effusion and evidence of stranding. Suspect this is a para-pneumonic process vs empyema in setting of MSSA bacteremia. Patient is on warfarin and aspirin which could pose a risk for pleural bleeding with pleurolytics. CT from June reviewed and no posterior fluid collection at that time.      Plan:  --Recommend thoracic surgery consult to see if candidate for VATS  --If not surgical candidate, can consult IR for CT guided chest tube placed in posterior collection.  --Complicated case: Given this patient is on chronic anticoagulation, pleurolytics will place him at increased risk of bleeding. At this time will defer to thoracic surgery  --Pulmonary will follow peripherally            Thank you for your consult. I  will follow-up with patient. Please contact us if you have any additional questions.     Prasad Rouse MD  Pulmonology  [facility] - Cardiology Stepdown

## 2023-08-02 NOTE — ASSESSMENT & PLAN NOTE
-H/O chronic MSSA DLES infection, on suppressive Doxycyline at home  -See above sepsis.  Now with new gram + bacteremia  -On Cefaxolin   -ID following   -Repeat CT of C/A/P 8/1-prior stranding noted around DL site resolved

## 2023-08-02 NOTE — NURSING
Pt's VAD dressing changed per protocol using kit and sterile technique. Pt's drive line site is dry, intact with small brown drainage at site and inner layer of old dressing, DLES is + (2). No kinks or frays on drive line, secured with melendez anchor. Pt tolerated dressing change well. Next dressing change due 08/03/2023.

## 2023-08-02 NOTE — ASSESSMENT & PLAN NOTE
-CT imaging this admission consistent with small left sided pleural effusion and complicated pleural around left heart border  -Pulm consulted- suspected para-pneumonic process vs empyema in setting of MSSA bacteremia  -Thoracic surgery consulted for VATS  -If not surgical candidate than will consult IR for CT guided chest tube placed in posterior collection.  -Will consider lytics

## 2023-08-02 NOTE — ASSESSMENT & PLAN NOTE
38 year-old male with LVAD, recent COVID PNA s/p treatment, staph bacteremia that presented with elements of pleuritis, fever found to have CT imaging this admission consistent with small left sided pleural effusion and complicated pleural around left heart border. Given history of LVAD and known bacteremia recommend having the fluid drained. Bedside ultrasound showed small pleural effusion and evidence of stranding. Suspect this is a para-pneumonic process vs empyema in setting of MSSA bacteremia. Patient is on warfarin and aspirin which could pose a risk for pleural bleeding with pleurolytics. CT from June reviewed and no posterior fluid collection at that time.      Plan:  --Recommend thoracic surgery consult to see if candidate for VATS  --If not surgical candidate, can consult IR for CT guided chest tube placed in posterior collection.  --Complicated case: Given this patient is on chronic anticoagulation, pleurolytics will place him at increased risk of bleeding. At this time will defer to thoracic surgery  --Pulmonary will follow peripherally

## 2023-08-02 NOTE — SUBJECTIVE & OBJECTIVE
Past Medical History:   Diagnosis Date    Acute respiratory failure with hypoxia 7/22/2023    Arthritis     Awareness alteration, transient 9/1/2022    Cardiomyopathy     CHF (congestive heart failure) 10/01/2020    COVID-19 6/3/2023    Diabetes mellitus     Dilated cardiomyopathy 10/26/2020    Drug abuse 10/2020    Headache 4/19/2023    Hyperglycemia 12/16/2022    Hyperosmolar hyperglycemic state (HHS) 5/25/2022    ICD (implantable cardioverter-defibrillator) in place 10/26/2020    Left ventricular assist device (LVAD) complication, initial encounter 6/5/2023    Muscle cramping 6/15/2022    Renal disorder     SOB (shortness of breath) 6/13/2022    Tingling in extremities 7/13/2022       Past Surgical History:   Procedure Laterality Date    APPLICATION OF WOUND VACUUM-ASSISTED CLOSURE DEVICE N/A 6/30/2022    Procedure: APPLICATION, WOUND VAC;  Surgeon: Luis F Paige MD;  Location: [facility] OR [address] [place] [address];  Service: Cardiovascular;  Laterality: N/A;  50 x 5 cm    CARDIAC DEFIBRILLATOR PLACEMENT      IMPLANTATION OF RIGHT VENTRICULAR ASSIST DEVICE (RVAD) N/A 6/29/2022    Procedure: INSERTION, RVAD;  Surgeon: Luis F Paige MD;  [facility];  Service: Cardiovascular;  Laterality: N/A;    INSERTION OF GRAFT TO PERICARDIUM Right 6/30/2022    Procedure: INSERTION-RIGHT VENTRICULAR ASSIST DEVICE;  Surgeon: Luis F Paige MD;  Location: [facility] OR 2ND FLR;  Service: Cardiovascular;  Laterality: Right;    IRRIGATION OF MEDIASTINUM  6/30/2022    Procedure: IRRIGATION, MEDIASTINUM;  Surgeon: Luis F Paige MD;  [facility];  Service: Cardiovascular;;    LEFT VENTRICULAR ASSIST DEVICE Left 6/23/2022    Procedure: INSERTION-LEFT VENTRICULAR ASSIST DEVICE;  Surgeon: Luis F Paige MD;  Location: [facility] OR [facility] FLR;  Service: Cardiovascular;  Laterality: Left;    LEFT VENTRICULAR ASSIST DEVICE N/A 6/29/2022    Procedure: INSERTION-LEFT VENTRICULAR ASSIST DEVICE;  Surgeon: Luis F Paige MD;  Location: [facility] OR [facility]  FLR;  Service: Cardiovascular;  Laterality: N/A;    RIGHT HEART CATHETERIZATION Right 2022    Procedure: INSERTION, CATHETER, RIGHT HEART;  Surgeon: Luca Lopez Jr., MD;  Location: [facility] CATH LAB;  Service: Cardiology;  Laterality: Right;    RIGHT HEART CATHETERIZATION Right 2022    Procedure: INSERTION, CATHETER, RIGHT HEART;  Surgeon: Josh Pulido MD;  Location: [facility] CATH LAB;  Service: Cardiology;  Laterality: Right;    RIGHT HEART CATHETERIZATION Right 2022    Procedure: INSERTION, CATHETER, RIGHT HEART;  Surgeon: Dalia Crum MD;  Location: [facility] CATH LAB;  Service: Cardiology;  Laterality: Right;    RIGHT HEART CATHETERIZATION Right 10/31/2022    Procedure: INSERTION, CATHETER, RIGHT HEART;  Surgeon: Dalia Crum MD;  Location: [facility] CATH LAB;  Service: Cardiology;  Laterality: Right;    STERNAL WOUND CLOSURE N/A 2022    Procedure: CLOSURE, WOUND, STERNUM;  Surgeon: Luis F Paige MD;  Location: [facility] OR [facility] FLR;  Service: Cardiovascular;  Laterality: N/A;       Review of patient's allergies indicates:   Allergen Reactions    Bumex [bumetanide] Hives    Torsemide Hives       Family History       Problem Relation (Age of Onset)    Diverticulosis Brother    Heart attack Maternal Grandmother, Maternal Grandfather    Heart failure Father          Tobacco Use    Smoking status: Former     Current packs/day: 0.00     Average packs/day: 0.5 packs/day for 16.6 years (8.3 ttl pk-yrs)     Types: Cigarettes     Start date: 10/1/2004     Quit date: 2021     Years since quittin.2    Smokeless tobacco: Never   Substance and Sexual Activity    Alcohol use: Not Currently    Drug use: Not Currently     Types: Marijuana, MDMA (Ecstacy)    Sexual activity: Yes     Partners: Female     Birth control/protection: None         Review of Systems   Constitutional:  Negative for chills, fatigue and fever.   Respiratory:  Negative for cough, chest tightness and shortness of breath (has  exertional shortness of breath at baseline, but none currently.).    Cardiovascular:  Positive for chest pain (worse with leaning forward). Negative for leg swelling.   Gastrointestinal:  Negative for abdominal pain, nausea and vomiting.   Skin: Negative.    Neurological:  Negative for dizziness and light-headedness.   Psychiatric/Behavioral:  Negative for agitation, behavioral problems and confusion.      Objective:     Vital Signs (Most Recent):  Temp: 98.5 °F (36.9 °C) (08/02/23 0750)  Pulse: (!) 118 (08/02/23 0750)  Resp: 18 (08/02/23 0750)  BP: 97/69 (08/02/23 0752)  SpO2: 95 % (08/02/23 0750) Vital Signs (24h Range):  Temp:  [97.8 °F (36.6 °C)-98.6 °F (37 °C)] 98.5 °F (36.9 °C)  Pulse:  [108-126] 118  Resp:  [18-20] 18  SpO2:  [95 %-99 %] 95 %  BP: ()/(0-69) 97/69     Weight: 90.3 kg (199 lb 1.2 oz)  Body mass index is 24.88 kg/m².      Intake/Output Summary (Last 24 hours) at 8/2/2023 1025  Last data filed at 8/2/2023 1020  Gross per 24 hour   Intake 600 ml   Output 2600 ml   Net -2000 ml        Physical Exam  Vitals reviewed.   Constitutional:       General: He is not in acute distress.     Appearance: Normal appearance.   HENT:      Head: Normocephalic and atraumatic.      Right Ear: External ear normal.      Left Ear: External ear normal.   Eyes:      General:         Right eye: No discharge.         Left eye: No discharge.      Conjunctiva/sclera: Conjunctivae normal.   Cardiovascular:      Heart sounds: Normal heart sounds.      No friction rub (difficult to assess with LVAD hum).      Comments: LVAD hum noted on exam  Pulmonary:      Effort: Pulmonary effort is normal. No respiratory distress.      Breath sounds: Normal breath sounds. No wheezing or rales.   Abdominal:      General: Abdomen is flat. There is no distension.   Musculoskeletal:      Cervical back: Normal range of motion.      Right lower leg: No edema.      Left lower leg: No edema.   Skin:     General: Skin is warm and dry.    Neurological:      Mental Status: He is alert and oriented to person, place, and time. Mental status is at baseline.   Psychiatric:         Mood and Affect: Mood normal.         Behavior: Behavior normal.          Vents:       Lines/Drains/Airways       Peripherally Inserted Central Catheter Line  Duration             PICC Double Lumen 08/01/23 1121 right basilic <1 day              Line  Duration                  VAD 06/29/22 1115 Left ventricular assist device HeartMate 3 398 days                    Significant Labs:    CBC/Anemia Profile:  Recent Labs   Lab 08/01/23  0342 08/02/23  0604   WBC 34.78* 24.71*   HGB 10.9* 11.1*   HCT 32.8* 33.5*   * 417   MCV 83 82   RDW 24.3* 24.0*        Chemistries:  Recent Labs   Lab 08/01/23  0342 08/02/23  0604   * 135*   K 4.7 4.3    104   CO2 22* 22*   BUN 40* 32*   CREATININE 1.3 1.4   CALCIUM 9.5 9.3   ALBUMIN 2.4* 2.4*   PROT 7.5 7.4   BILITOT 0.5 0.7   ALKPHOS 151* 137*   ALT 31 22   AST 55* 39   MG 2.0 1.9   PHOS 3.3 3.9       All pertinent labs within the past 24 hours have been reviewed.    Significant Imaging:   I have reviewed all pertinent imaging results/findings within the past 24 hours.

## 2023-08-02 NOTE — CONSULTS
[facility] - Cardiology Stepdown  Thoracic Surgery  Consult Note    Patient Name: Kevan Queen  MRN: [id number]  Code Status: Full Code  Admission Date: 7/22/2023  Hospital Length of Stay: 11 days  Primary Care Provider: ORALIA Cline    Inpatient consult to Cardiothoracic Surgery  Consult performed by: Damion Felipe MD  Consult ordered by: DEYA Martinez        Subjective:     Reason for Consult: left pleural effusion    History of Present Illness: Kevan Queen is a 38 y.o. gentleman with history of heart failure s/p LVAD in 2022, chronic MSSA drive line infection, and DM who is admitted with COVID. He had originally presented with chest pain associated with fever for 3 days prior to admission but denies shortness of breath. No sick contacts. Last + blood culture on 7/28. CT was performed which showed a left sided pleural effusion that is mostly posterior, but has an anterior component. Unclear if these pockets communicate but they appear to be simple. CT surgery consulted for evaluation for VATS for management of pleural effusion.            No current facility-administered medications on file prior to encounter.     Current Outpatient Medications on File Prior to Encounter   Medication Sig    blood sugar diagnostic Strp Use to test blood glucose 4 (four) times daily.    blood-glucose meter Misc Use as instructed    busPIRone (BUSPAR) 10 MG tablet Take 10 mg by mouth 2 (two) times daily.    cyclobenzaprine (FEXMID) 7.5 MG Tab Take 7.5 mg by mouth every 8 (eight) hours as needed.    doxycycline (VIBRAMYCIN) 100 MG Cap Take 1 capsule (100 mg total) by mouth 2 (two) times daily.    furosemide (LASIX) 80 MG tablet Take 1 tablet (80 mg total) by mouth once daily.    insulin aspart U-100 (NOVOLOG) 100 unit/mL (3 mL) InPn pen Inject 16 Units into the skin 3 (three) times daily with meals. Plus Sliding Scale: 151-200: +1, 201-250: +2, 251-300: +3, 301-350: +4 and call MD; Max Daily Dose: 60 units     insulin detemir U-100, Levemir, 100 unit/mL (3 mL) SubQ InPn pen Inject 22 Units into the skin 2 (two) times daily.    INV ASPIRIN 100MG/PLACEBO Take 1 capsule (100 mg) by mouth once daily. FOR INVESTIGATIONAL USE ONLY. Protocol: GASTON ACKERMAN subject # 5974.    lancets 33 gauge Misc Use to test blood glucose 4 (four) times daily.    levETIRAcetam (KEPPRA) 1000 MG tablet Take 1 tablet (1,000 mg total) by mouth 2 (two) times daily.    milrinone (PRIMACOR) 1 mg/mL injection Inject into the vein.    milrinone 20mg/100ml D5W, 200mcg/ml, (PRIMACOR) 20 mg/100 mL (200 mcg/mL) infusion Inject 34.875 mcg/min into the vein continuous.    mirtazapine (REMERON) 15 MG tablet Take 1 tablet (15 mg total) by mouth nightly.    potassium chloride (K-TAB) 20 mEq Take 40 mEq by mouth 3 (three) times daily.    spironolactone (ALDACTONE) 25 MG tablet Take 1 tablet (25 mg total) by mouth once daily.    valsartan (DIOVAN) 40 MG tablet Take 1 tablet (40 mg total) by mouth 2 (two) times daily.    warfarin (COUMADIN) 3 MG tablet Take 1.5 tablets (4.5mg) orally daily, except 2 tablets (6mg) on Wednesdays and saturdays    warfarin (COUMADIN) 3 MG tablet Take 1.5-2 tablets (4.5-6 mg total) by mouth Daily.    [DISCONTINUED] digoxin (LANOXIN) 125 mcg tablet Take 1 tablet (0.125 mg total) by mouth once daily.    [DISCONTINUED] EScitalopram oxalate (LEXAPRO) 5 MG Tab Take 1 tablet (5 mg total) by mouth once daily.    [DISCONTINUED] insulin (LANTUS SOLOSTAR U-100 INSULIN) glargine 100 units/mL (3mL) SubQ pen Inject 10 Units into the skin every evening. (Patient not taking: Reported on 5/24/2022)    [DISCONTINUED] metOLazone (ZAROXOLYN) 2.5 MG tablet Take 2.5 mg by mouth every other day.    [DISCONTINUED] metoprolol succinate (TOPROL-XL) 25 MG 24 hr tablet TAKE 1 TABLET(25 MG) BY MOUTH EVERY DAY    [DISCONTINUED] pantoprazole (PROTONIX) 40 MG tablet Take 1 tablet (40 mg total) by mouth once daily.       Review of patient's allergies  indicates:   Allergen Reactions    Bumex [bumetanide] Hives    Torsemide Hives       Past Medical History:   Diagnosis Date    Acute respiratory failure with hypoxia 7/22/2023    Arthritis     Awareness alteration, transient 9/1/2022    Cardiomyopathy     CHF (congestive heart failure) 10/01/2020    COVID-19 6/3/2023    Diabetes mellitus     Dilated cardiomyopathy 10/26/2020    Drug abuse 10/2020    Headache 4/19/2023    Hyperglycemia 12/16/2022    Hyperosmolar hyperglycemic state (HHS) 5/25/2022    ICD (implantable cardioverter-defibrillator) in place 10/26/2020    Left ventricular assist device (LVAD) complication, initial encounter 6/5/2023    Muscle cramping 6/15/2022    Renal disorder     SOB (shortness of breath) 6/13/2022    Tingling in extremities 7/13/2022     Past Surgical History:   Procedure Laterality Date    APPLICATION OF WOUND VACUUM-ASSISTED CLOSURE DEVICE N/A 6/30/2022    Procedure: APPLICATION, WOUND VAC;  Surgeon: Luis F Paige MD;  Location: [facility] OR [address] [place] [address];  Service: Cardiovascular;  Laterality: N/A;  50 x 5 cm    CARDIAC DEFIBRILLATOR PLACEMENT      IMPLANTATION OF RIGHT VENTRICULAR ASSIST DEVICE (RVAD) N/A 6/29/2022    Procedure: INSERTION, RVAD;  Surgeon: Luis F Paige MD;  [facility];  Service: Cardiovascular;  Laterality: N/A;    INSERTION OF GRAFT TO PERICARDIUM Right 6/30/2022    Procedure: INSERTION-RIGHT VENTRICULAR ASSIST DEVICE;  Surgeon: Luis F Paige MD;  [facility];  Service: Cardiovascular;  Laterality: Right;    IRRIGATION OF MEDIASTINUM  6/30/2022    Procedure: IRRIGATION, MEDIASTINUM;  Surgeon: Luis F Paige MD;  Location: [facility] OR 2ND FLR;  Service: Cardiovascular;;    LEFT VENTRICULAR ASSIST DEVICE Left 6/23/2022    Procedure: INSERTION-LEFT VENTRICULAR ASSIST DEVICE;  Surgeon: Luis F Paige MD;  [facility];  Service: Cardiovascular;  Laterality: Left;    LEFT VENTRICULAR ASSIST DEVICE N/A 6/29/2022     Procedure: INSERTION-LEFT VENTRICULAR ASSIST DEVICE;  Surgeon: Luis F Paige MD;  [facility];  Service: Cardiovascular;  Laterality: N/A;    RIGHT HEART CATHETERIZATION Right 2022    Procedure: INSERTION, CATHETER, RIGHT HEART;  Surgeon: Luca Lopez Jr., MD;  Location: [facility] CATH LAB;  Service: Cardiology;  Laterality: Right;    RIGHT HEART CATHETERIZATION Right 2022    Procedure: INSERTION, CATHETER, RIGHT HEART;  Surgeon: Josh Pulido MD;  Location: [facility] CATH LAB;  Service: Cardiology;  Laterality: Right;    RIGHT HEART CATHETERIZATION Right 2022    Procedure: INSERTION, CATHETER, RIGHT HEART;  Surgeon: Dalia Crum MD;  Location: [facility] CATH LAB;  Service: Cardiology;  Laterality: Right;    RIGHT HEART CATHETERIZATION Right 10/31/2022    Procedure: INSERTION, CATHETER, RIGHT HEART;  Surgeon: Dalia Crum MD;  Location: [facility] CATH LAB;  Service: Cardiology;  Laterality: Right;    STERNAL WOUND CLOSURE N/A 2022    Procedure: CLOSURE, WOUND, STERNUM;  Surgeon: Luis F Paige MD;  [address]  Service: Cardiovascular;  Laterality: N/A;     Family History       Problem Relation (Age of Onset)    Diverticulosis Brother    Heart attack Maternal Grandmother, Maternal Grandfather    Heart failure Father          Tobacco Use    Smoking status: Former     Current packs/day: 0.00     Average packs/day: 0.5 packs/day for 16.6 years (8.3 ttl pk-yrs)     Types: Cigarettes     Start date: 10/1/2004     Quit date: 2021     Years since quittin.2    Smokeless tobacco: Never   Substance and Sexual Activity    Alcohol use: Not Currently    Drug use: Not Currently     Types: Marijuana, MDMA (Ecstacy)    Sexual activity: Yes     Partners: Female     Birth control/protection: None     Review of Systems   Constitutional:  Positive for fever. Negative for chills.   HENT:  Negative for hearing loss and sore throat.    Eyes:  Negative for discharge  and visual disturbance.   Respiratory:  Negative for chest tightness and shortness of breath.    Cardiovascular:  Positive for chest pain. Negative for leg swelling.   Gastrointestinal:  Negative for abdominal distention, abdominal pain, nausea and vomiting.   Genitourinary:  Negative for difficulty urinating and hematuria.   Musculoskeletal:  Negative for back pain and joint swelling.   Skin:  Negative for color change and rash.   Neurological:  Negative for numbness and headaches.     Objective:     Vital Signs (Most Recent):  Temp: 98.2 °F (36.8 °C) (08/02/23 1150)  Pulse: (!) 118 (08/02/23 1219)  Resp: 18 (08/02/23 1150)  BP: (!) 82/59 (08/02/23 1152)  SpO2: 99 % (08/02/23 1150) Vital Signs (24h Range):  Temp:  [98.1 °F (36.7 °C)-98.6 °F (37 °C)] 98.2 °F (36.8 °C)  Pulse:  [108-126] 118  Resp:  [18-20] 18  SpO2:  [95 %-99 %] 99 %  BP: ()/(0-69) 82/59     Weight: 90.3 kg (199 lb 1.2 oz)  Body mass index is 24.88 kg/m².    Intake/Output - Last 3 Shifts         07/31 0700  08/01 0659 08/01 0700  08/02 0659 08/02 0700  08/03 0659    P.O. 1440 960 240    Total Intake(mL/kg) 1440 (15.9) 960 (10.6) 240 (2.7)    Urine (mL/kg/hr) 3900 (1.8) 3150 (1.4) 1850 (2.7)    Stool 0 0     Total Output 3900 3150 1850    Net -2460 -2190 -1610           Stool Occurrence 1 x 1 x             SpO2: 99 %        Physical Exam  Constitutional:       General: He is not in acute distress.     Appearance: Normal appearance.   HENT:      Head: Normocephalic and atraumatic.   Cardiovascular:      Rate and Rhythm: Normal rate and regular rhythm.   Pulmonary:      Effort: Pulmonary effort is normal. No respiratory distress.      Breath sounds: Normal breath sounds.   Abdominal:      General: Abdomen is flat. There is no distension.      Palpations: Abdomen is soft.      Tenderness: There is no abdominal tenderness.      Comments: Drive line in place with dressings in place  No tenderness around drive line   Neurological:      General: No  focal deficit present.      Mental Status: He is alert and oriented to person, place, and time.   Psychiatric:         Behavior: Behavior normal.         Thought Content: Thought content normal.            Significant Labs:  CBC:   Recent Labs   Lab 08/02/23  0604   WBC 24.71*   RBC 4.09*   HGB 11.1*   HCT 33.5*      MCV 82   MCH 27.1   MCHC 33.1     CMP:   Recent Labs   Lab 08/02/23  0604   *   CALCIUM 9.3   ALBUMIN 2.4*   PROT 7.4   *   K 4.3   CO2 22*      BUN 32*   CREATININE 1.4   ALKPHOS 137*   ALT 22   AST 39   BILITOT 0.7       Significant Diagnostics:  I have reviewed all pertinent imaging results/findings within the past 24 hours.    VTE Risk Mitigation (From admission, onward)           Ordered     warfarin tablet 4.5 mg  Daily         08/02/23 0935                      Assessment/Plan:     Pleural effusion  Kevan Queen is a 38 y.o. gentleman with an LVAD with a pleural effusion in the setting of COVID and staph bacteremia.     - Recommend image guided placement of pigtail catheter into collection  - would defer VATS. Based on images, collection would likely be able to resolve without surgical intervention          Damion Felipe MD  Thoracic Surgery  [facility] - [facility] Step[facility]   [FreeTextEntry1] : Urticaria

## 2023-08-03 LAB
ALBUMIN SERPL BCP-MCNC: 2.3 G/DL (ref 3.5–5.2)
ALP SERPL-CCNC: 159 U/L (ref 55–135)
ALT SERPL W/O P-5'-P-CCNC: 29 U/L (ref 10–44)
ANION GAP SERPL CALC-SCNC: 10 MMOL/L (ref 8–16)
APTT PPP: 30.3 SEC (ref 21–32)
AST SERPL-CCNC: 54 U/L (ref 10–40)
BACTERIA BLD CULT: NORMAL
BACTERIA BLD CULT: NORMAL
BASOPHILS # BLD AUTO: 0.04 K/UL (ref 0–0.2)
BASOPHILS NFR BLD: 0.2 % (ref 0–1.9)
BILIRUB SERPL-MCNC: 0.7 MG/DL (ref 0.1–1)
BUN SERPL-MCNC: 27 MG/DL (ref 6–20)
CALCIUM SERPL-MCNC: 9 MG/DL (ref 8.7–10.5)
CHLORIDE SERPL-SCNC: 101 MMOL/L (ref 95–110)
CO2 SERPL-SCNC: 22 MMOL/L (ref 23–29)
CREAT SERPL-MCNC: 1.2 MG/DL (ref 0.5–1.4)
DIFFERENTIAL METHOD: ABNORMAL
EOSINOPHIL # BLD AUTO: 0.1 K/UL (ref 0–0.5)
EOSINOPHIL NFR BLD: 0.3 % (ref 0–8)
ERYTHROCYTE [DISTWIDTH] IN BLOOD BY AUTOMATED COUNT: 23.6 % (ref 11.5–14.5)
EST. GFR  (NO RACE VARIABLE): >60 ML/MIN/1.73 M^2
GLUCOSE SERPL-MCNC: 169 MG/DL (ref 70–110)
GRAM STN SPEC: NORMAL
GRAM STN SPEC: NORMAL
HCT VFR BLD AUTO: 32.6 % (ref 40–54)
HGB BLD-MCNC: 10.4 G/DL (ref 14–18)
IMM GRANULOCYTES # BLD AUTO: 0.26 K/UL (ref 0–0.04)
IMM GRANULOCYTES NFR BLD AUTO: 1.3 % (ref 0–0.5)
INR PPP: 1.5 (ref 0.8–1.2)
INR PPP: 1.5 (ref 0.8–1.2)
LDH SERPL L TO P-CCNC: 389 U/L (ref 110–260)
LYMPHOCYTES # BLD AUTO: 2.7 K/UL (ref 1–4.8)
LYMPHOCYTES NFR BLD: 13.3 % (ref 18–48)
MAGNESIUM SERPL-MCNC: 1.9 MG/DL (ref 1.6–2.6)
MCH RBC QN AUTO: 27.1 PG (ref 27–31)
MCHC RBC AUTO-ENTMCNC: 31.9 G/DL (ref 32–36)
MCV RBC AUTO: 85 FL (ref 82–98)
MONOCYTES # BLD AUTO: 1.3 K/UL (ref 0.3–1)
MONOCYTES NFR BLD: 6.3 % (ref 4–15)
NEUTROPHILS # BLD AUTO: 16 K/UL (ref 1.8–7.7)
NEUTROPHILS NFR BLD: 78.6 % (ref 38–73)
NRBC BLD-RTO: 0 /100 WBC
PHOSPHATE SERPL-MCNC: 3 MG/DL (ref 2.7–4.5)
PLATELET # BLD AUTO: 376 K/UL (ref 150–450)
PMV BLD AUTO: 9.6 FL (ref 9.2–12.9)
POCT GLUCOSE: 117 MG/DL (ref 70–110)
POCT GLUCOSE: 162 MG/DL (ref 70–110)
POCT GLUCOSE: 167 MG/DL (ref 70–110)
POCT GLUCOSE: 170 MG/DL (ref 70–110)
POCT GLUCOSE: 183 MG/DL (ref 70–110)
POCT GLUCOSE: 233 MG/DL (ref 70–110)
POTASSIUM SERPL-SCNC: 4.1 MMOL/L (ref 3.5–5.1)
PROT SERPL-MCNC: 7.2 G/DL (ref 6–8.4)
PROTHROMBIN TIME: 15.3 SEC (ref 9–12.5)
PROTHROMBIN TIME: 15.3 SEC (ref 9–12.5)
RBC # BLD AUTO: 3.84 M/UL (ref 4.6–6.2)
SODIUM SERPL-SCNC: 133 MMOL/L (ref 136–145)
WBC # BLD AUTO: 20.28 K/UL (ref 3.9–12.7)

## 2023-08-03 PROCEDURE — 88305 TISSUE EXAM BY PATHOLOGIST: CPT | Performed by: PATHOLOGY

## 2023-08-03 PROCEDURE — 93750 PR INTERROGATE VENT ASSIST DEV, IN PERSON, W PHYSICIAN ANALYSIS: ICD-10-PCS | Mod: ,,, | Performed by: INTERNAL MEDICINE

## 2023-08-03 PROCEDURE — 99233 SBSQ HOSP IP/OBS HIGH 50: CPT | Mod: ,,, | Performed by: STUDENT IN AN ORGANIZED HEALTH CARE EDUCATION/TRAINING PROGRAM

## 2023-08-03 PROCEDURE — 84100 ASSAY OF PHOSPHORUS: CPT | Performed by: HOSPITALIST

## 2023-08-03 PROCEDURE — 85610 PROTHROMBIN TIME: CPT | Performed by: HOSPITALIST

## 2023-08-03 PROCEDURE — 89051 BODY FLUID CELL COUNT: CPT | Performed by: PHYSICIAN ASSISTANT

## 2023-08-03 PROCEDURE — 88112 PR  CYTOPATH, CELL ENHANCE TECH: ICD-10-PCS | Mod: 26,,, | Performed by: PATHOLOGY

## 2023-08-03 PROCEDURE — 85730 THROMBOPLASTIN TIME PARTIAL: CPT | Performed by: STUDENT IN AN ORGANIZED HEALTH CARE EDUCATION/TRAINING PROGRAM

## 2023-08-03 PROCEDURE — 25000003 PHARM REV CODE 250: Performed by: INTERNAL MEDICINE

## 2023-08-03 PROCEDURE — 99223 1ST HOSP IP/OBS HIGH 75: CPT | Mod: 25,,, | Performed by: PHYSICIAN ASSISTANT

## 2023-08-03 PROCEDURE — 83735 ASSAY OF MAGNESIUM: CPT | Performed by: HOSPITALIST

## 2023-08-03 PROCEDURE — 87015 SPECIMEN INFECT AGNT CONCNTJ: CPT | Performed by: PHYSICIAN ASSISTANT

## 2023-08-03 PROCEDURE — 25000003 PHARM REV CODE 250: Performed by: PHYSICIAN ASSISTANT

## 2023-08-03 PROCEDURE — 99223 PR INITIAL HOSPITAL CARE,LEVL III: ICD-10-PCS | Mod: 25,,, | Performed by: PHYSICIAN ASSISTANT

## 2023-08-03 PROCEDURE — 83615 LACTATE (LD) (LDH) ENZYME: CPT | Performed by: HOSPITALIST

## 2023-08-03 PROCEDURE — 99233 SBSQ HOSP IP/OBS HIGH 50: CPT | Mod: ,,, | Performed by: PHYSICIAN ASSISTANT

## 2023-08-03 PROCEDURE — 25000003 PHARM REV CODE 250: Performed by: NURSE PRACTITIONER

## 2023-08-03 PROCEDURE — 88112 CYTOPATH CELL ENHANCE TECH: CPT | Performed by: PATHOLOGY

## 2023-08-03 PROCEDURE — 99233 PR SUBSEQUENT HOSPITAL CARE,LEVL III: ICD-10-PCS | Mod: ,,, | Performed by: PHYSICIAN ASSISTANT

## 2023-08-03 PROCEDURE — 63600175 PHARM REV CODE 636 W HCPCS: Performed by: INTERNAL MEDICINE

## 2023-08-03 PROCEDURE — 27000248 HC VAD-ADDITIONAL DAY

## 2023-08-03 PROCEDURE — 87206 SMEAR FLUORESCENT/ACID STAI: CPT | Performed by: PHYSICIAN ASSISTANT

## 2023-08-03 PROCEDURE — 94761 N-INVAS EAR/PLS OXIMETRY MLT: CPT

## 2023-08-03 PROCEDURE — 87205 SMEAR GRAM STAIN: CPT | Performed by: PHYSICIAN ASSISTANT

## 2023-08-03 PROCEDURE — 88112 CYTOPATH CELL ENHANCE TECH: CPT | Mod: 26,,, | Performed by: PATHOLOGY

## 2023-08-03 PROCEDURE — 87075 CULTR BACTERIA EXCEPT BLOOD: CPT | Performed by: PHYSICIAN ASSISTANT

## 2023-08-03 PROCEDURE — 25000003 PHARM REV CODE 250: Performed by: HOSPITALIST

## 2023-08-03 PROCEDURE — 99233 PR SUBSEQUENT HOSPITAL CARE,LEVL III: ICD-10-PCS | Mod: ,,, | Performed by: STUDENT IN AN ORGANIZED HEALTH CARE EDUCATION/TRAINING PROGRAM

## 2023-08-03 PROCEDURE — 85025 COMPLETE CBC W/AUTO DIFF WBC: CPT | Performed by: NURSE PRACTITIONER

## 2023-08-03 PROCEDURE — 87070 CULTURE OTHR SPECIMN AEROBIC: CPT | Performed by: PHYSICIAN ASSISTANT

## 2023-08-03 PROCEDURE — 27000207 HC ISOLATION

## 2023-08-03 PROCEDURE — 20600001 HC STEP DOWN PRIVATE ROOM

## 2023-08-03 PROCEDURE — 87116 MYCOBACTERIA CULTURE: CPT | Performed by: PHYSICIAN ASSISTANT

## 2023-08-03 PROCEDURE — 88305 TISSUE EXAM BY PATHOLOGIST: CPT | Mod: 26,,, | Performed by: PATHOLOGY

## 2023-08-03 PROCEDURE — 80053 COMPREHEN METABOLIC PANEL: CPT | Performed by: NURSE PRACTITIONER

## 2023-08-03 PROCEDURE — A4216 STERILE WATER/SALINE, 10 ML: HCPCS | Performed by: INTERNAL MEDICINE

## 2023-08-03 PROCEDURE — 93750 INTERROGATION VAD IN PERSON: CPT | Mod: ,,, | Performed by: INTERNAL MEDICINE

## 2023-08-03 PROCEDURE — 88305 TISSUE EXAM BY PATHOLOGIST: ICD-10-PCS | Mod: 26,,, | Performed by: PATHOLOGY

## 2023-08-03 PROCEDURE — 63600175 PHARM REV CODE 636 W HCPCS: Performed by: HOSPITALIST

## 2023-08-03 RX ORDER — LIDOCAINE HYDROCHLORIDE 10 MG/ML
INJECTION INFILTRATION; PERINEURAL
Status: COMPLETED | OUTPATIENT
Start: 2023-08-03 | End: 2023-08-03

## 2023-08-03 RX ORDER — LANOLIN ALCOHOL/MO/W.PET/CERES
400 CREAM (GRAM) TOPICAL ONCE
Status: COMPLETED | OUTPATIENT
Start: 2023-08-03 | End: 2023-08-03

## 2023-08-03 RX ORDER — WARFARIN 3 MG/1
6 TABLET ORAL DAILY
Status: DISCONTINUED | OUTPATIENT
Start: 2023-08-03 | End: 2023-08-07

## 2023-08-03 RX ADMIN — LIDOCAINE HYDROCHLORIDE 5 ML: 10 INJECTION, SOLUTION INFILTRATION; PERINEURAL at 04:08

## 2023-08-03 RX ADMIN — INSULIN ASPART 20 UNITS: 100 INJECTION, SOLUTION INTRAVENOUS; SUBCUTANEOUS at 05:08

## 2023-08-03 RX ADMIN — INSULIN DETEMIR 24 UNITS: 100 INJECTION, SOLUTION SUBCUTANEOUS at 09:08

## 2023-08-03 RX ADMIN — MILRINONE LACTATE 0.25 MCG/KG/MIN: 0.2 INJECTION, SOLUTION INTRAVENOUS at 08:08

## 2023-08-03 RX ADMIN — MILRINONE LACTATE 0.25 MCG/KG/MIN: 0.2 INJECTION, SOLUTION INTRAVENOUS at 05:08

## 2023-08-03 RX ADMIN — Medication 400 MG: at 10:08

## 2023-08-03 RX ADMIN — WARFARIN SODIUM 6 MG: 3 TABLET ORAL at 05:08

## 2023-08-03 RX ADMIN — Medication 10 ML: at 12:08

## 2023-08-03 RX ADMIN — ACETAMINOPHEN 650 MG: 325 TABLET ORAL at 02:08

## 2023-08-03 RX ADMIN — HYDROCODONE BITARTRATE AND ACETAMINOPHEN 1 TABLET: 5; 325 TABLET ORAL at 05:08

## 2023-08-03 RX ADMIN — INSULIN ASPART 4 UNITS: 100 INJECTION, SOLUTION INTRAVENOUS; SUBCUTANEOUS at 02:08

## 2023-08-03 RX ADMIN — INSULIN ASPART 3 UNITS: 100 INJECTION, SOLUTION INTRAVENOUS; SUBCUTANEOUS at 12:08

## 2023-08-03 RX ADMIN — ASPIRIN 81 MG: 81 TABLET, COATED ORAL at 08:08

## 2023-08-03 RX ADMIN — ACETAMINOPHEN 650 MG: 325 TABLET ORAL at 10:08

## 2023-08-03 RX ADMIN — BUSPIRONE HYDROCHLORIDE 10 MG: 10 TABLET ORAL at 08:08

## 2023-08-03 RX ADMIN — ACETAMINOPHEN 650 MG: 325 TABLET ORAL at 08:08

## 2023-08-03 RX ADMIN — INSULIN ASPART 3 UNITS: 100 INJECTION, SOLUTION INTRAVENOUS; SUBCUTANEOUS at 08:08

## 2023-08-03 RX ADMIN — POLYETHYLENE GLYCOL 3350 17 G: 17 POWDER, FOR SOLUTION ORAL at 08:08

## 2023-08-03 RX ADMIN — FUROSEMIDE 80 MG: 80 TABLET ORAL at 08:08

## 2023-08-03 RX ADMIN — INSULIN ASPART 20 UNITS: 100 INJECTION, SOLUTION INTRAVENOUS; SUBCUTANEOUS at 12:08

## 2023-08-03 RX ADMIN — VALSARTAN 40 MG: 40 TABLET, FILM COATED ORAL at 08:08

## 2023-08-03 RX ADMIN — MIRTAZAPINE 15 MG: 15 TABLET, FILM COATED ORAL at 08:08

## 2023-08-03 RX ADMIN — SENNOSIDES AND DOCUSATE SODIUM 2 TABLET: 50; 8.6 TABLET ORAL at 08:08

## 2023-08-03 RX ADMIN — HYDROCODONE BITARTRATE AND ACETAMINOPHEN 1 TABLET: 5; 325 TABLET ORAL at 12:08

## 2023-08-03 RX ADMIN — INSULIN ASPART 2 UNITS: 100 INJECTION, SOLUTION INTRAVENOUS; SUBCUTANEOUS at 08:08

## 2023-08-03 RX ADMIN — Medication 10 ML: at 05:08

## 2023-08-03 RX ADMIN — INSULIN ASPART 20 UNITS: 100 INJECTION, SOLUTION INTRAVENOUS; SUBCUTANEOUS at 08:08

## 2023-08-03 RX ADMIN — HYDROCODONE BITARTRATE AND ACETAMINOPHEN 1 TABLET: 5; 325 TABLET ORAL at 11:08

## 2023-08-03 RX ADMIN — LEVETIRACETAM 1000 MG: 500 TABLET, FILM COATED ORAL at 08:08

## 2023-08-03 RX ADMIN — CEFAZOLIN 8 G: 2 INJECTION, POWDER, FOR SOLUTION INTRAMUSCULAR; INTRAVENOUS at 10:08

## 2023-08-03 RX ADMIN — HYDROCODONE BITARTRATE AND ACETAMINOPHEN 1 TABLET: 5; 325 TABLET ORAL at 04:08

## 2023-08-03 RX ADMIN — Medication 10 ML: at 06:08

## 2023-08-03 RX ADMIN — LIDOCAINE HYDROCHLORIDE 10 ML: 10 INJECTION, SOLUTION INFILTRATION; PERINEURAL at 04:08

## 2023-08-03 RX ADMIN — INSULIN DETEMIR 24 UNITS: 100 INJECTION, SOLUTION SUBCUTANEOUS at 08:08

## 2023-08-03 NOTE — PROGRESS NOTES
08/03/2023  Mirela Perez    Current provider:  Marlo Marques MD    Device interrogation:  TXP LVAD INTERROGATIONS 8/3/2023 8/3/2023 8/2/2023 8/2/2023 8/2/2023 8/2/2023 8/2/2023   Type HeartMate3 - HeartMate3 HeartMate3 HeartMate3 HeartMate3 HeartMate3   Flow 4.3 4.2 4.6 4.8 4.2 4.4 4.4   Speed 5100 5100 5100 5100 5100 5100 5100   PI 4.0 4.9 3.1 4.6 3.4 4.1 3.9   Power (Serna) 3.7 3.7 3.7 3.7 3.8 3.7 3.7   LSL 4700 4700 4700 4700 4700 4700 4700   Low Flow Alarm - - - - - - -   High Power Alarm - - - - - - -   Pulsatility Pulse Pulse Pulse Pulse Pulse Pulse -          Rounded on Kevan Queen to ensure all mechanical assist device settings (IABP or VAD) were appropriate and all parameters were within limits.  I was able to ensure all back up equipment was present, the staff had no issues, and the Perfusion Department daily rounding was complete.      For implantable VADs: Interrogation of Ventricular assist device was performed with analysis of device parameters and review of device function. I have personally reviewed the interrogation findings and agree with findings as stated.     In emergency, the nursing units have been notified to contact the perfusion department either by:  Calling [phone] from 630am to 4pm Mon thru Fri, utilizing the On-Call Finder functionality of Epic and searching for Perfusion, or by contacting the hospital  from 4pm to 630am and on weekends and asking to speak with the perfusionist on call.    7:11 AM

## 2023-08-03 NOTE — CONSULTS
"Chest Tube Placement Consult Note  Interventional Radiology    Consult Requested By: Angela Fregoso PA   Reason for Consult: pigtail catheter placement for pleural effusion     SUBJECTIVE:     Chief Complaint:  L pleural effusion, not a candidate for VATS, request L chest tube placement    History of Present Illness:  Kevan Queen is a 38 y.o. male with a PMHx of CHF s/p LVAD in 2022, chronic MSSA drive line infection, and T2DM who was admitted on 7/22/23 for pleural effusion in the setting of COVID and staph bacteremia. Interventional Radiology has been consulted for image guided left chest tube placement for management of pleural effusion. Pt has had recent imaging including a CT chest on 8/1/23 which revealed "loculated fluid along the left lateral heart border, favor loculated pleural effusion in the setting of small left pleural effusion more caudally. Complex pericardial effusion could appear similar". The pt has not had a prior thoracentesis. He was evaluated by both pulm and CTS who recommend chest tube placement as he is not a candidate for VATS. The pt is not in respiratory distress, satting well on RA. The pt is persistently tachycardic, otherwise HDS.     Review of Systems   Constitutional: Negative.    Respiratory:  Positive for shortness of breath. Negative for cough.    Cardiovascular: Negative.    Gastrointestinal: Negative.    Genitourinary: Negative.    Musculoskeletal: Negative.    Neurological: Negative.        Scheduled Meds:   aspirin  81 mg Oral Daily    busPIRone  10 mg Oral BID    ceFAZolin (ANCEF) 8 g in dextrose 5 % (D5W) 500 mL CONTINUOUS INFUSION  8 g Intravenous Q24H    furosemide  80 mg Oral Daily    insulin aspart U-100  20 Units Subcutaneous TIDWM    insulin detemir U-100  24 Units Subcutaneous BID    levETIRAcetam  1,000 mg Oral BID    mirtazapine  15 mg Oral Nightly    polyethylene glycol  17 g Oral Daily    senna-docusate 8.6-50 mg  2 tablet Oral BID    sodium chloride 0.9%  " 10 mL Intravenous Q6H    valsartan  40 mg Oral BID    warfarin  6 mg Oral Daily     Continuous Infusions:   milrinone 20mg/100ml D5W (200mcg/ml) 0.25 mcg/kg/min (08/03/23 0553)     PRN Meds:acetaminophen, dextrose 10%, dextrose 10%, glucagon (human recombinant), glucose, glucose, HYDROcodone-acetaminophen, insulin aspart U-100, Flushing PICC Protocol **AND** sodium chloride 0.9% **AND** sodium chloride 0.9%    Review of patient's allergies indicates:   Allergen Reactions    Bumex [bumetanide] Hives    Torsemide Hives       Past Medical History:   Diagnosis Date    Acute respiratory failure with hypoxia 7/22/2023    Arthritis     Awareness alteration, transient 9/1/2022    Cardiomyopathy     CHF (congestive heart failure) 10/01/2020    COVID-19 6/3/2023    Diabetes mellitus     Dilated cardiomyopathy 10/26/2020    Drug abuse 10/2020    Headache 4/19/2023    Hyperglycemia 12/16/2022    Hyperosmolar hyperglycemic state (HHS) 5/25/2022    ICD (implantable cardioverter-defibrillator) in place 10/26/2020    Left ventricular assist device (LVAD) complication, initial encounter 6/5/2023    Muscle cramping 6/15/2022    Renal disorder     SOB (shortness of breath) 6/13/2022    Tingling in extremities 7/13/2022     Past Surgical History:   Procedure Laterality Date    APPLICATION OF WOUND VACUUM-ASSISTED CLOSURE DEVICE N/A 6/30/2022    Procedure: APPLICATION, WOUND VAC;  Surgeon: Luis F Paige MD;  Location: [facility] OR [address] [place] [address];  Service: Cardiovascular;  Laterality: N/A;  50 x 5 cm    CARDIAC DEFIBRILLATOR PLACEMENT      IMPLANTATION OF RIGHT VENTRICULAR ASSIST DEVICE (RVAD) N/A 6/29/2022    Procedure: INSERTION, RVAD;  Surgeon: Luis F Paige MD;  [facility];  Service: Cardiovascular;  Laterality: N/A;    INSERTION OF GRAFT TO PERICARDIUM Right 6/30/2022    Procedure: INSERTION-RIGHT VENTRICULAR ASSIST DEVICE;  Surgeon: Luis F Paige MD;  Location: [facility] OR [address] [place] [address];  Service: Cardiovascular;  Laterality: Right;     IRRIGATION OF MEDIASTINUM  6/30/2022    Procedure: IRRIGATION, MEDIASTINUM;  Surgeon: Luis F Paige MD;  [address]  Service: Cardiovascular;;    LEFT VENTRICULAR ASSIST DEVICE Left 6/23/2022    Procedure: INSERTION-LEFT VENTRICULAR ASSIST DEVICE;  Surgeon: Luis F Paige MD;  [address]  Service: Cardiovascular;  Laterality: Left;    LEFT VENTRICULAR ASSIST DEVICE N/A 6/29/2022    Procedure: INSERTION-LEFT VENTRICULAR ASSIST DEVICE;  Surgeon: Luis F Paige MD;  [facility];  Service: Cardiovascular;  Laterality: N/A;    RIGHT HEART CATHETERIZATION Right 4/8/2022    Procedure: INSERTION, CATHETER, RIGHT HEART;  Surgeon: Luca Lopez Jr., MD;  Location: [facility] CATH LAB;  Service: Cardiology;  Laterality: Right;    RIGHT HEART CATHETERIZATION Right 4/19/2022    Procedure: INSERTION, CATHETER, RIGHT HEART;  Surgeon: Josh Pulido MD;  Location: [facility] CATH LAB;  Service: Cardiology;  Laterality: Right;    RIGHT HEART CATHETERIZATION Right 7/21/2022    Procedure: INSERTION, CATHETER, RIGHT HEART;  Surgeon: Dalia Crum MD;  Location: [facility] CATH LAB;  Service: Cardiology;  Laterality: Right;    RIGHT HEART CATHETERIZATION Right 10/31/2022    Procedure: INSERTION, CATHETER, RIGHT HEART;  Surgeon: Dalia Curm MD;  Location: [facility] CATH LAB;  Service: Cardiology;  Laterality: Right;    STERNAL WOUND CLOSURE N/A 6/30/2022    Procedure: CLOSURE, WOUND, STERNUM;  Surgeon: Luis F Paige MD;  [address]  Service: Cardiovascular;  Laterality: N/A;     Family History   Problem Relation Age of Onset    Heart failure Father     Diverticulosis Brother     Heart attack Maternal Grandmother     Heart attack Maternal Grandfather      Social History     Tobacco Use    Smoking status: Former     Current packs/day: 0.00     Average packs/day: 0.5 packs/day for 16.6 years (8.3 ttl pk-yrs)     Types: Cigarettes     Start date: 10/1/2004     Quit date:  2021     Years since quittin.2    Smokeless tobacco: Never   Substance Use Topics    Alcohol use: Not Currently    Drug use: Not Currently     Types: Marijuana, MDMA (Ecstacy)       OBJECTIVE:     Vital Signs (Most Recent)  Temp: 98.9 °F (37.2 °C) (23)  Pulse: (!) 121 (23 0600)  Resp: 16 (23 0436)  BP: (!) 68/0 (23)  SpO2: 96 % (23)    Physical Exam:  Physical Exam  Vitals and nursing note reviewed.   Constitutional:       General: He is not in acute distress.     Appearance: Normal appearance. He is not ill-appearing.   HENT:      Head: Normocephalic and atraumatic.   Eyes:      Extraocular Movements: Extraocular movements intact.      Conjunctiva/sclera: Conjunctivae normal.      Pupils: Pupils are equal, round, and reactive to light.   Pulmonary:      Effort: Pulmonary effort is normal. No respiratory distress.   Abdominal:      General: There is no distension.      Tenderness: There is no abdominal tenderness.      Comments: Midline LVAD DL   Skin:     General: Skin is warm and dry.      Coloration: Skin is not jaundiced.   Neurological:      General: No focal deficit present.      Mental Status: He is alert and oriented to person, place, and time.   Psychiatric:         Mood and Affect: Mood normal.         Behavior: Behavior normal.         Thought Content: Thought content normal.         Judgment: Judgment normal.         Laboratory  I have reviewed all pertinent lab results within the past 24 hours.  CBC:   Recent Labs   Lab 23   WBC 20.28*   RBC 3.84*   HGB 10.4*   HCT 32.6*      MCV 85   MCH 27.1   MCHC 31.9*     BMP:   Recent Labs   Lab 23   *   *   K 4.1      CO2 22*   BUN 27*   CREATININE 1.2   CALCIUM 9.0   MG 1.9     CMP:   Recent Labs   Lab 23   *   CALCIUM 9.0   ALBUMIN 2.3*   PROT 7.2   *   K 4.1   CO2 22*      BUN 27*   CREATININE 1.2   ALKPHOS 159*   ALT 29   AST  54*   BILITOT 0.7     LFTs:   Recent Labs   Lab 08/03/23 0441   ALT 29   AST 54*   ALKPHOS 159*   BILITOT 0.7   PROT 7.2   ALBUMIN 2.3*     Coagulation:   Recent Labs   Lab 08/03/23 0441   LABPROT 15.3*  15.3*   INR 1.5*  1.5*   APTT 30.3     Microbiology Results (last 7 days)       Procedure Component Value Units Date/Time    Blood culture [[id number]] Collected: 07/29/23 1130    Order Status: Completed Specimen: Blood Updated: 08/02/23 1412     Blood Culture, Routine No Growth to date      No Growth to date      No Growth to date      No Growth to date      No Growth to date    Narrative:      Please obtain 2 sets after RIJ is pulled, thank you    Blood culture [[id number]] Collected: 07/29/23 1132    Order Status: Completed Specimen: Blood Updated: 08/02/23 1412     Blood Culture, Routine No Growth to date      No Growth to date      No Growth to date      No Growth to date      No Growth to date    Narrative:      Please obtain 2 sets after RIJ is pulled, thank you  Rt hand    Blood culture [[id number]] Collected: 07/30/23 0810    Order Status: Completed Specimen: Blood Updated: 08/02/23 1012     Blood Culture, Routine No Growth to date      No Growth to date      No Growth to date      No Growth to date    Blood culture [[id number]] Collected: 07/30/23 0810    Order Status: Completed Specimen: Blood Updated: 08/02/23 1012     Blood Culture, Routine No Growth to date      No Growth to date      No Growth to date      No Growth to date    Blood culture [[id number]]  (Abnormal) Collected: 07/28/23 0940    Order Status: Completed Specimen: Blood from Peripheral, Left Hand Updated: 08/01/23 0834     Blood Culture, Routine Gram stain aer bottle: Gram positive cocci in clusters resembling Staph      Positive results previously called 07/30/2023  13:09      STAPHYLOCOCCUS AUREUS  ID consult required at [facility].Jeanette Thomas and Alexandra hilton.  For susceptibility see order #[id number]      Blood culture [[id number]]   (Abnormal)  (Susceptibility) Collected: 07/28/23 0939    Order Status: Completed Specimen: Blood from Antecubital, Left Arm Updated: 07/31/23 1026     Blood Culture, Routine Gram stain aer bottle: Gram positive cocci in clusters resembling Staph      Positive results previously called 07/29/2023  21:20      STAPHYLOCOCCUS AUREUS  ID consult required at [facility].      Blood culture [[id number]] Collected: 07/25/23 0450    Order Status: Completed Specimen: Blood Updated: 07/30/23 0612     Blood Culture, Routine No growth after 5 days.    Blood culture [[id number]]  (Abnormal)  (Susceptibility) Collected: 07/25/23 0451    Order Status: Completed Specimen: Blood Updated: 07/29/23 1045     Blood Culture, Routine Gram stain aer bottle: Gram positive cocci in clusters resembling Staph      Results called to and read back by:Laura Kilgore RN 07/27/2023  13:01      STAPHYLOCOCCUS AUREUS  ID consult required at [facility] and [facility] locations.      MRSA/SA Rapid ID by PCR from Blood culture [[id number]]  (Abnormal) Collected: 07/25/23 0451    Order Status: Completed Updated: 07/27/23 1438     Staph aureus ID by PCR Positive     Methicillin Resistant ID by PCR Negative            ASA/Mallampati  ASA: 3  Mallampati: 2    Imaging:  Recent imaging studies including CT chest on 8/1/23 which was independently reviewed by Jaziel Hill MD.     ASSESSMENT/PLAN:     Assessment:  38 y.o. male with a PMHx of CHF s/p LVAD in 2022, chronic MSSA drive line infection, and T2DM who has been referred to IR for image guided left chest tube placement for management of pleural effusion. The procedure was discussed in great detail with the patient including thorough explanations of the potential risks and benefits of chest tube placement. Risks include infection, hemorrhage, damage to surrounding structures such as the lung, catheter malfunction, inability to remove catheter, pneumothorax and catheter  dislodgment. The patient is a candidate for US guided left chest tube placement  with local anesthesia . Plan discussed with ordering physician.The pt verbalized understanding of the plan and would like to proceed.    Plan:  Will proceed with US guided left chest tube placement  with local anesthesia  on 8/3/23. Of note, pt covid positive status, will need to be the last case of the day.  Anticoagulation history reviewed. Pt on warfarin, ok per staff  Coagulation labs reviewed. INR 1.5, ok per staff  Thank you for the consult. Please contact with questions via [employer] [employer] secure chat or [employer]    Lynette Rivera PA-C  Interventional Radiology  Spectra: [phone]

## 2023-08-03 NOTE — PLAN OF CARE
Plan of care discussed with patient.  Patient ambulating independently, fall precautions in place. LVAD DP and numbers WNL, smooth LVAD hum. Discussed medications and care.  Patient has no questions at this time. Will continue to monitor.     Pt c/o L chest/pleural effusion pain, relieved with PRN tylenol & norco. HR back to 120s after pain meds. VSS. Milrinone gtt and continuous ancef infusing per order. BG checked at bedtime & 2am. Insulin given per order.     CVP 7

## 2023-08-03 NOTE — ASSESSMENT & PLAN NOTE
-Patient presents with elevated fevers, white blood cell count, elevated lactic acid.   -Possible Secondary to COVID-19 infection.  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 Repeated 7/29 with NGTD. Repeated again 7/30   -7/29, 7/30 negative   -PICC line replaced 8/1  -Continue Ancef 8g IV q 24 hours for total of 6 weeks with end date 9/10  -RIJ TLC D/C'd on 7/29  -Echo done 7/31 and no vegetations appreciated

## 2023-08-03 NOTE — ASSESSMENT & PLAN NOTE
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  -Continue Coumadin, Goal INR 2.0-3.0. subtherapeutic at 1.8 after coumadin held in setting of INR 3.4. Pharmacy to dose but for slow rise given planned chest tube placement   -LDH is stable.  Will continue to monitor daily.  -Speed set at 5100, LSL 4700 rpm  -Echo: 7/31/23 EF: 10-15%, LVEDD: 6.87 cm.  No vegation appreciated     Procedure: Device Interrogation Including analysis of device parameters  Current Settings: Ventricular Assist Device  Review of device function is stable    TXP LVAD INTERROGATIONS 8/3/2023 8/3/2023 8/3/2023 8/2/2023 8/2/2023 8/2/2023 8/2/2023   Type HeartMate3 HeartMate3 - HeartMate3 HeartMate3 HeartMate3 HeartMate3   Flow 4.4 4.3 4.2 4.6 4.8 4.2 4.4   Speed 5100 5100 5100 5100 5100 5100 5100   PI 4.2 4.0 4.9 3.1 4.6 3.4 4.1   Power (Serna) 3.6 3.7 3.7 3.7 3.7 3.8 3.7   LSL 4700 4700 4700 4700 4700 4700 4700   Low Flow Alarm - - - - - - -   High Power Alarm - - - - - - -   Pulsatility Pulse Pulse Pulse Pulse Pulse Pulse Pulse

## 2023-08-03 NOTE — NURSING
Pt's VAD dressing changed per protocol using kit and sterile technique. Pt's drive line site is dry, intact with small brown drainage at site and inner layer of old dressing, DLES is + (2). No kinks or frays on drive line, secured with melendez anchor. Pt tolerated dressing change well. Next dressing change due 08/04/2023.

## 2023-08-03 NOTE — PROCEDURES
VIR Post-Procedure Note    Pre Op Diagnosis:  Loculated pleural effusion    Post Op Diagnosis: same    Procedure: Image-guided Chest Tube Placement (left side)    Procedure performed by:  Tigist Dye MD / DEMETRIUS Hill MD    Written Informed Consent Obtained:  Yes    Specimen Removed:  Yes    Estimated Blood Loss:  Minimal     Findings:     Local anesthesia were used.    Successful placement of 10 Fr Fr chest tube in the left pleural space.    200 cc thick bloody fluid was removed and sent to the lab for further analysis.       The patient tolerated the procedure well and there were no complications.  Please see Imaging report for further details.    PLAN:  Tube management by pulmonology       Tigist Dye MD  VIR Fellow

## 2023-08-03 NOTE — NURSING
Notified MD. [place] pt just came  back  from procedure and c/o sever pain at procedure site, and Norco not due until 1838, and MD. [place] is ok to give Norco now, and Norco given at 1747. Connected chest tube to suction @ -20 per order.

## 2023-08-03 NOTE — PROGRESS NOTES
[facility] - Cardiology Stepdown  Heart Transplant  Progress Note    Patient Name: Kevan Queen  MRN: [id number]  Admission Date: 7/22/2023  Hospital Length of Stay: 12 days  Attending Physician: Marlo Marques MD  Primary Care Provider: Rosio Armendariz FNP  Principal Problem:Sepsis    Subjective:     **Interval History: PICC line in place and CVP 7.  Interventional radiology consulted and patient approved for chest tube placement.  Appreciate Pulm and ID recommendations.  Patient reports no new complaints.  Had 22 beat run NSVT overnight.  INR is subtherapeutic at 1.5.  pharmacy to adjust dose but will not boost in anticipation of chest tube placement.     Continuous Infusions:   milrinone 20mg/100ml D5W (200mcg/ml) 0.25 mcg/kg/min (08/03/23 0553)     Scheduled Meds:   aspirin  81 mg Oral Daily    busPIRone  10 mg Oral BID    ceFAZolin (ANCEF) 8 g in dextrose 5 % (D5W) 500 mL CONTINUOUS INFUSION  8 g Intravenous Q24H    furosemide  80 mg Oral Daily    insulin aspart U-100  20 Units Subcutaneous TIDWM    insulin detemir U-100  24 Units Subcutaneous BID    levETIRAcetam  1,000 mg Oral BID    mirtazapine  15 mg Oral Nightly    polyethylene glycol  17 g Oral Daily    senna-docusate 8.6-50 mg  2 tablet Oral BID    sodium chloride 0.9%  10 mL Intravenous Q6H    valsartan  40 mg Oral BID    warfarin  6 mg Oral Daily     PRN Meds:acetaminophen, dextrose 10%, dextrose 10%, glucagon (human recombinant), glucose, glucose, HYDROcodone-acetaminophen, insulin aspart U-100, Flushing PICC Protocol **AND** sodium chloride 0.9% **AND** sodium chloride 0.9%    Review of patient's allergies indicates:   Allergen Reactions    Bumex [bumetanide] Hives    Torsemide Hives     Objective:     Vital Signs (Most Recent):  Temp: 98.3 °F (36.8 °C) (08/03/23 0750)  Pulse: (!) 118 (08/03/23 0750)  Resp: 18 (08/03/23 0750)  BP: (!) 106/51 (08/03/23 0752)  SpO2: 97 % (08/03/23 0750) Vital Signs (24h Range):  Temp:  [98.2 °F (36.8  °C)-98.9 °F (37.2 °C)] 98.3 °F (36.8 °C)  Pulse:  [110-136] 118  Resp:  [16-20] 18  SpO2:  [95 %-99 %] 97 %  BP: ()/(0-70) 106/51     Patient Vitals for the past 72 hrs (Last 3 readings):   Weight   08/02/23 0750 90.3 kg (199 lb 1.2 oz)   08/01/23 0750 90.7 kg (199 lb 15.3 oz)   07/31/23 1450 90.3 kg (199 lb)       Body mass index is 24.88 kg/m².      Intake/Output Summary (Last 24 hours) at 8/3/2023 0913  Last data filed at 8/3/2023 0637  Gross per 24 hour   Intake 1062 ml   Output 2600 ml   Net -1538 ml         Hemodynamic Parameters:       Telemetry: ST       Physical Exam  Constitutional:       Appearance: Normal appearance.   HENT:      Head: Normocephalic and atraumatic.   Eyes:      Conjunctiva/sclera: Conjunctivae normal.      Pupils: Pupils are equal, round, and reactive to light.   Neck:      Comments: Do not appreciate elevated neck veins  Cardiovascular:      Rate and Rhythm: Regular rhythm. Tachycardia present.      Comments: Smooth VAD hum  Pulmonary:      Effort: Pulmonary effort is normal.      Breath sounds: Normal breath sounds.   Abdominal:      General: Bowel sounds are normal.      Palpations: Abdomen is soft.   Musculoskeletal:         General: No swelling. Normal range of motion.      Cervical back: Normal range of motion and neck supple.   Skin:     General: Skin is warm and dry.      Capillary Refill: Capillary refill takes 2 to 3 seconds.   Neurological:      General: No focal deficit present.      Mental Status: He is alert and oriented to person, place, and time.   Psychiatric:         Mood and Affect: Mood normal.         Behavior: Behavior normal.         Thought Content: Thought content normal.         Judgment: Judgment normal.            Significant Labs:  CBC:  Recent Labs   Lab 08/01/23  0342 08/02/23  0604 08/03/23  0441   WBC 34.78* 24.71* 20.28*   RBC 3.97* 4.09* 3.84*   HGB 10.9* 11.1* 10.4*   HCT 32.8* 33.5* 32.6*   * 417 376   MCV 83 82 85   MCH 27.5 27.1 27.1    MCHC 33.2 33.1 31.9*       BNP:  Recent Labs   Lab 07/28/23  0431 07/31/23  0501 08/02/23  0604   * 159* 82       CMP:  Recent Labs   Lab 08/01/23  0342 08/02/23  0604 08/03/23  0441   * 184* 169*   CALCIUM 9.5 9.3 9.0   ALBUMIN 2.4* 2.4* 2.3*   PROT 7.5 7.4 7.2   * 135* 133*   K 4.7 4.3 4.1   CO2 22* 22* 22*    104 101   BUN 40* 32* 27*   CREATININE 1.3 1.4 1.2   ALKPHOS 151* 137* 159*   ALT 31 22 29   AST 55* 39 54*   BILITOT 0.5 0.7 0.7        Coagulation:   Recent Labs   Lab 07/31/23  0501 08/01/23  0716 08/02/23  0604 08/03/23  0441   INR 3.4* 2.5* 1.8*  1.8* 1.5*  1.5*   APTT 33.8*  --  30.6 30.3       LDH:  Recent Labs   Lab 08/01/23  0342 08/02/23  0604 08/03/23  0441   * 359* 389*       Microbiology:  Microbiology Results (last 7 days)       Procedure Component Value Units Date/Time    Blood culture [[id number]] Collected: 07/29/23 1130    Order Status: Completed Specimen: Blood Updated: 08/02/23 1412     Blood Culture, Routine No Growth to date      No Growth to date      No Growth to date      No Growth to date      No Growth to date    Narrative:      Please obtain 2 sets after RIJ is pulled, thank you    Blood culture [[id number]] Collected: 07/29/23 1132    Order Status: Completed Specimen: Blood Updated: 08/02/23 1412     Blood Culture, Routine No Growth to date      No Growth to date      No Growth to date      No Growth to date      No Growth to date    Narrative:      Please obtain 2 sets after RIJ is pulled, thank you  Rt hand    Blood culture [[id number]] Collected: 07/30/23 0810    Order Status: Completed Specimen: Blood Updated: 08/02/23 1012     Blood Culture, Routine No Growth to date      No Growth to date      No Growth to date      No Growth to date    Blood culture [[id number]] Collected: 07/30/23 0810    Order Status: Completed Specimen: Blood Updated: 08/02/23 1012     Blood Culture, Routine No Growth to date      No Growth to date      No Growth to date       No Growth to date    Blood culture [[id number]]  (Abnormal) Collected: 07/28/23 0940    Order Status: Completed Specimen: Blood from Peripheral, Left Hand Updated: 08/01/23 0834     Blood Culture, Routine Gram stain aer bottle: Gram positive cocci in clusters resembling Staph      Positive results previously called 07/30/2023  13:09      STAPHYLOCOCCUS AUREUS  ID consult required at Health system.  For susceptibility see order #[id number]      Blood culture [[id number]]  (Abnormal)  (Susceptibility) Collected: 07/28/23 0939    Order Status: Completed Specimen: Blood from Antecubital, Left Arm Updated: 07/31/23 1026     Blood Culture, Routine Gram stain aer bottle: Gram positive cocci in clusters resembling Staph      Positive results previously called 07/29/2023  21:20      STAPHYLOCOCCUS AUREUS  ID consult required at [facility] and [facility].      Blood culture [[id number]] Collected: 07/25/23 0450    Order Status: Completed Specimen: Blood Updated: 07/30/23 0612     Blood Culture, Routine No growth after 5 days.    Blood culture [[id number]]  (Abnormal)  (Susceptibility) Collected: 07/25/23 0451    Order Status: Completed Specimen: Blood Updated: 07/29/23 1045     Blood Culture, Routine Gram stain aer bottle: Gram positive cocci in clusters resembling Staph      Results called to and read back by:Laura Kilgore RN 07/27/2023  13:01      STAPHYLOCOCCUS AUREUS  ID consult required at [facility] and [facility].      MRSA/SA Rapid ID by PCR from Blood culture [[id number]]  (Abnormal) Collected: 07/25/23 0451    Order Status: Completed Updated: 07/27/23 1438     Staph aureus ID by PCR Positive     Methicillin Resistant ID by PCR Negative            I have reviewed all pertinent labs within the past 24 hours.    Estimated Creatinine Clearance: 99.8 mL/min (based on SCr of 1.2 mg/dL).    Diagnostic Results:  I have reviewed and interpreted all pertinent imaging  results/findings within the past 24 hours.    Assessment and Plan:     Patient is a 36 yo black male with stage d HFrEF, NICM, ? 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 He underwent RVAD removal and chest closure 6/30/2022.  He was weaned off  but he had to restarted due to RVF. He was eventually transitioned to milrinone (secondary to  shortage) now on 0.25 mcg/kg/min.  Patient also has history of driveline infection.  Patient presented to the emergency room today because of pleuritic chest pain that has been going on for the last 3 days however it was more intense today.  Also complains of having shortness of breath and fevers associated with it.  He was spiking fever of 102 in the emergency room with elevated white blood cell count up to 17,000. He was treated with Zosyn.  He was sent here for admission.  Patient was admitted a month back for COVID infection for which he received remdesivir for 3 days.  He is on doxycycline for chronic driveline infection.  He is on Milrinone for RV failure.  Patient denies having any low flow alarms on the pump.  Also denies having any lower extremity edema, increased discharge from his driveline site.  Patient has ground-glass opacities on imaging from outside hospital      * Sepsis  -Patient presents with elevated fevers, white blood cell count, elevated lactic acid.   -Possible Secondary to COVID-19 infection.  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 Repeated 7/29 with NGTD. Repeated again 7/30   -7/29, 7/30 negative   -PICC line replaced 8/1  -Continue Ancef 8g IV q 24 hours for total of 6 weeks with end date 9/10  -[facility] TLC D/C'd on 7/29  -Echo done 7/31 and no vegetations appreciated      Bacteremia due to Staphylococcus  -See sepsis    Pleural effusion  -CT imaging this admission consistent with small left sided pleural effusion and complicated pleural around left heart border  -Pulm consulted- suspected para-pneumonic process vs empyema in setting of MSSA bacteremia  -Thoracic surgery consulted for VATS and patient not candidate  -IR consulted for CT guided chest tube placed in posterior collection.  -pulm following and will consider lytics     LVAD (left ventricular assist device) present  -HeartMate 3 Implanted on 6/29/2022 as DT with RV failure on milrinone at 0.25 mcg/kg/min.  -Continue Coumadin, Goal INR 2.0-3.0. subtherapeutic at 1.8 after coumadin held in setting of INR 3.4. Pharmacy to dose but for slow rise given planned chest tube placement   -LDH is stable.  Will continue to monitor daily.  -Speed set at 5100, LSL 4700 rpm  -Echo: 7/31/23 EF: 10-15%, LVEDD: 6.87 cm.  No vegation appreciated     Procedure: Device Interrogation Including analysis of device parameters  Current Settings: Ventricular Assist Device  Review of device function is stable    TXP LVAD INTERROGATIONS 8/3/2023 8/3/2023 8/3/2023 8/2/2023 8/2/2023 8/2/2023 8/2/2023   Type HeartMate3 HeartMate3 - HeartMate3 HeartMate3 HeartMate3 HeartMate3   Flow 4.4 4.3 4.2 4.6 4.8 4.2 4.4   Speed 5100 5100 5100 5100 5100 5100 5100   PI 4.2 4.0 4.9 3.1 4.6 3.4 4.1   Power (Serna) 3.6 3.7 3.7 3.7 3.7 3.8 3.7   LSL 4700 4700 4700 4700 4700 4700 4700   Low Flow Alarm - - - - - - -   High Power Alarm - - - - - - -   Pulsatility Pulse Pulse Pulse Pulse Pulse Pulse Pulse       Chronic combined systolic and diastolic heart failure  -NICM  -Last 2D Echo 10/31/22: LVEF 10%, LVEDD 7.25 cm  -Euvolemic on examination today  -Current diuretic regimen:Lasix 80mg Qdaily  -Status post LVAD.  -GDMT: On Valsartan, Aldactone and Toprol at home. Valsartan 40 mg po bid resumed. K+ has trended up at 4.8 - D/C'd KCL 40 mEq bid. 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  Now with new gram + bacteremia  -On Cefaxolin   -ID following   -Repeat CT of C/A/P 8/1-prior stranding noted around DL site resolved    Type 2 diabetes mellitus with hyperglycemia  -Will keep him on sliding scale and a.c. HS    Seizure-like activity  -On on DEYA Irizarry  Heart Transplant  [facility] - Cardiology Stepdown

## 2023-08-03 NOTE — ASSESSMENT & PLAN NOTE
-CT imaging this admission consistent with small left sided pleural effusion and complicated pleural around left heart border  -Pulm consulted- suspected para-pneumonic process vs empyema in setting of MSSA bacteremia  -Thoracic surgery consulted for VATS and patient not candidate  -IR consulted for CT guided chest tube placed in posterior collection.  -pulm following and will consider lytics

## 2023-08-03 NOTE — NURSING TRANSFER
Nursing Transfer Note      8/3/2023   4:05 PM    Reason patient is being transferred: pleural infusion    Transfer To: IR unit    Transfer via stretcher    Transfer with cardiac monitoring    Transported by transportation tech    Telemetry: Box Number [id number]  Order for Tele Monitor? Yes

## 2023-08-03 NOTE — PLAN OF CARE
Chest tube complete. Pt tolerated well. VSS. No signs or symptoms of distress noted.  VSS.  Pt will be transferred to [facility]. Report called to RIAN Rueda.

## 2023-08-03 NOTE — PLAN OF CARE
Plan of Care update:    No acute events overnight. Patient denies all symptoms and complaints at this time. Pending ultrasound guided left chest tube placement by Interventional Radiology, planned for today 8/3/23.     Pulmonology will continue to follow.     Fausto Belle MD  Pulmonology

## 2023-08-03 NOTE — PROGRESS NOTES
Update:  SW met with pt to assess needs and discuss dc plans.  Pt was sitting up in chair.  Nurse present in room.  Pt alert and oriented, calm and cooperative.  Pt reported that he does not have a working phone with him and the phone in room is not working.  Nursing present and agreed.   SW informed pt that HTS SW (Marielos) will be following and arranging his dc needs and will have ride arranged to get him home at dc.  Pt voiced understanding and agreement to same.  Pt expressed no other needs at this time.    HTS SW will follow.

## 2023-08-03 NOTE — PROGRESS NOTES
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 37 yo male with LVAD c/b prior MSSA DLESI s/p treatment admitted with MSSA bacteremia 2/2 to DLES. Admit blcx and repeat DLES cx with MSSA; prior PICC removed. Also found to have COVID, CXR with L >R opacity; s/p remdesivir and dex. Denies new joint/back pain or diarrhea. CVC (placed during hospital stay) removed on 7/29, repeat blcx post-line removal ngtd. TTE unrevealing for vegetations. Recent CXR with stable L>R opacity (stable) with pleural effusion.  CT with L loculated effusion (lateral heart border and base); prior stranding noted around DL site resolved. Plan for IR aspiration today. Leukocytosis trending down.     Recommendations:  -continue ancef 8g CI IV daily  -follow up repeat blcx, so far ngtd  -favor cx from IR aspiration                    Thank you for your consult. I will follow-up with patient. Please contact us if you have any additional questions. Above d/w primary team.       50 minutes of total time spent on the encounter, which includes face to face time and non-face to face time preparing to see the patient (eg, review of tests), obtaining and/or reviewing separately obtained history, documenting clinical information in the electronic or other health record, independently interpreting results (not separately reported) and communicating results to the patient/family/caregiver, or care coordination (not separately reported).       Laura Baldwin MD  Infectious Disease  [facility]    Subjective:      Principal Problem:Sepsis    HPI: 38 year old male with a history of CHF s/p LVAD placement in June of 2022.  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  He was apparently on room air for most of that hospital stay.  He received 3 days of remdesivir.  He was discharged on oral doxycycline to complete a 14 day course for the MSSA drive line infection.  He had tested negative for COVID-19 following his treatment.  He is re-admitted with complaints of shortness of breath, chest pains and generalized ill feeling. COVID-19 testing is positive again.  Chest x-ray shows diffuse infiltrates bilaterally.  ID is consulted to assist with his management.      Interval History: No fevers documented overnight.   Plan for IR aspiration for loculated fluid collection. Pt reports tolerating abx without issues.     Review of Systems  Objective:     Vital Signs (Most Recent):  Temp: 98.3 °F (36.8 °C) (08/03/23 0750)  Pulse: (!) 118 (08/03/23 0750)  Resp: 18 (08/03/23 0750)  BP: (!) 106/51 (08/03/23 0752)  SpO2: 97 % (08/03/23 0750) Vital Signs (24h Range):  Temp:  [98.2 °F (36.8 °C)-98.9 °F (37.2 °C)] 98.3 °F (36.8 °C)  Pulse:  [112-136] 118  Resp:  [16-20] 18  SpO2:  [95 %-99 %] 97 %  BP: ()/(0-70) 106/51     Weight: 90.3 kg (199 lb 1.2 oz)  Body mass index is 24.88 kg/m².    Estimated Creatinine Clearance: 99.8 mL/min (based on SCr of 1.2 mg/dL).     Physical Exam  Constitutional:       General: He is not in acute distress.     Appearance: He is not ill-appearing or toxic-appearing.   Pulmonary:      Effort: Pulmonary effort is normal.   Chest:      Chest wall: Tenderness (L pleuritic pain, stable) present.   Abdominal:      Comments: LVAD dressed   Neurological:      Mental Status: He is alert.          Significant Labs:   Microbiology Results (last 7 days)       Procedure Component Value Units Date/Time    Blood culture [[id number]]  Collected: 07/30/23 0810    Order Status: Completed Specimen: Blood Updated: 08/03/23 1012     Blood Culture, Routine No Growth to date      No Growth to date      No Growth to date      No Growth to date      No Growth to date    Blood culture [[id number]] Collected: 07/30/23 0810    Order Status: Completed Specimen: Blood Updated: 08/03/23 1012     Blood Culture, Routine No Growth to date      No Growth to date      No Growth to date      No Growth to date      No Growth to date    Aerobic culture [[id number]]     Order Status: No result Specimen: Pleural Fluid     Culture, Anaerobic [[id number]]     Order Status: No result Specimen: Pleural Fluid     Gram stain [[id number]]     Order Status: No result Specimen: Pleural Fluid     AFB Culture & Smear [[id number]]     Order Status: No result Specimen: Pleural Fluid     Blood culture [[id number]] Collected: 07/29/23 1130    Order Status: Completed Specimen: Blood Updated: 08/02/23 1412     Blood Culture, Routine No Growth to date      No Growth to date      No Growth to date      No Growth to date      No Growth to date    Narrative:      Please obtain 2 sets after RIJ is pulled, thank you    Blood culture [[id number]] Collected: 07/29/23 1132    Order Status: Completed Specimen: Blood Updated: 08/02/23 1412     Blood Culture, Routine No Growth to date      No Growth to date      No Growth to date      No Growth to date      No Growth to date    Narrative:      Please obtain 2 sets after RIJ is pulled, thank you  Rt hand    Blood culture [[id number]]  (Abnormal) Collected: 07/28/23 0940    Order Status: Completed Specimen: Blood from Peripheral, Left Hand Updated: 08/01/23 0834     Blood Culture, Routine Gram stain aer bottle: Gram positive cocci in clusters resembling Staph      Positive results previously called 07/30/2023  13:09      STAPHYLOCOCCUS AUREUS  ID consult required at [facility]  For susceptibility see order #[id number]       Blood culture [[id number]]  (Abnormal)  (Susceptibility) Collected: 07/28/23 0939    Order Status: Completed Specimen: Blood from Antecubital, Left Arm Updated: 07/31/23 1026     Blood Culture, Routine Gram stain aer bottle: Gram positive cocci in clusters resembling Staph      Positive results previously called 07/29/2023  21:20      STAPHYLOCOCCUS AUREUS  ID consult required at [facility].      Blood culture [[id number]] Collected: 07/25/23 0450    Order Status: Completed Specimen: Blood Updated: 07/30/23 0612     Blood Culture, Routine No growth after 5 days.    Blood culture [[id number]]  (Abnormal)  (Susceptibility) Collected: 07/25/23 0451    Order Status: Completed Specimen: Blood Updated: 07/29/23 1045     Blood Culture, Routine Gram stain aer bottle: Gram positive cocci in clusters resembling Staph      Results called to and read back by:Laura Kilgore RN 07/27/2023  13:01      STAPHYLOCOCCUS AUREUS  ID consult required at [facility] and [facility].      MRSA/SA Rapid ID by PCR from Blood culture [[id number]]  (Abnormal) Collected: 07/25/23 0451    Order Status: Completed Updated: 07/27/23 1438     Staph aureus ID by PCR Positive     Methicillin Resistant ID by PCR Negative            Significant Imaging: I have reviewed all pertinent imaging results/findings within the past 24 hours.

## 2023-08-03 NOTE — CARE UPDATE
Care Update:     No acute events overnight. Patient on the CSU in room 314/314 A. Blood glucose stable. BG at goal on current insulin regimen (SSI, prandial, and basal insulin ). Steroid use- None.    Renal function- Normal   Vasopressors-  None       Diet diabetic [facility]; 2800 Calorie     POCT Glucose   Date Value Ref Range Status   08/03/2023 167 (H) 70 - 110 mg/dL Final   08/03/2023 233 (H) 70 - 110 mg/dL Final   08/02/2023 194 (H) 70 - 110 mg/dL Final   08/02/2023 159 (H) 70 - 110 mg/dL Final   08/02/2023 198 (H) 70 - 110 mg/dL Final   08/02/2023 180 (H) 70 - 110 mg/dL Final   08/01/2023 176 (H) 70 - 110 mg/dL Final   08/01/2023 230 (H) 70 - 110 mg/dL Final   08/01/2023 123 (H) 70 - 110 mg/dL Final   08/01/2023 222 (H) 70 - 110 mg/dL Final   08/01/2023 309 (H) 70 - 110 mg/dL Final   08/01/2023 >500 (HH) 70 - 110 mg/dL Final   07/31/2023 179 (H) 70 - 110 mg/dL Final   07/31/2023 221 (H) 70 - 110 mg/dL Final   07/31/2023 322 (H) 70 - 110 mg/dL Final   07/31/2023 320 (H) 70 - 110 mg/dL Final     Lab Results   Component Value Date    HGBA1C 6.7 (H) 07/22/2023       Diabetes Medications               insulin aspart U-100 (NOVOLOG) 100 unit/mL (3 mL) InPn pen Inject 16 Units into the skin 3 (three) times daily with meals. Plus Sliding Scale: 151-200: +1, 201-250: +2, 251-300: +3, 301-350: +4 and call MD; Max Daily Dose: 60 units    insulin detemir U-100, Levemir, 100 unit/mL (3 mL) SubQ InPn pen Inject 22 Units into the skin 2 (two) times daily.          Endocrine  Type 2 diabetes mellitus with hyperglycemia  Endocrinology consulted for BG management.   BG goal 140-180     - Levemir (Insulin Detemir) 24 units BID   - Novolog (Insulin Aspart) 20 units TIDWM and prn for BG excursions [facility] SSI (150/25)   - BG checks /HS/0200  - Hypoglycemia protocol in place  - If blood glucose greater than 300, please ask patient not to eat food or drink anything other than water until correctional insulin has brought it back  below 250    ** Please notify Endocrine for any change and/or advance in diet**  ** Please call Endocrine for any BG related issues **    Discharge Planning:   TBD. Please notify endocrinology prior to discharge.      Michael Wyman DNP, FNP-C  Department of Endocrinology  Inpatient Glycemic Management

## 2023-08-03 NOTE — SUBJECTIVE & OBJECTIVE
**Interval History: PICC line in place and CVP 7.  Interventional radiology consulted and patient approved for chest tube placement.  Appreciate Pulm and ID recommendations.  Patient reports no new complaints.  Had 22 beat run NSVT overnight.  INR is subtherapeutic at 1.5.  pharmacy to adjust dose but will not boost in anticipation of chest tube placement.     Continuous Infusions:   milrinone 20mg/100ml D5W (200mcg/ml) 0.25 mcg/kg/min (08/03/23 0553)     Scheduled Meds:   aspirin  81 mg Oral Daily    busPIRone  10 mg Oral BID    ceFAZolin (ANCEF) 8 g in dextrose 5 % (D5W) 500 mL CONTINUOUS INFUSION  8 g Intravenous Q24H    furosemide  80 mg Oral Daily    insulin aspart U-100  20 Units Subcutaneous TIDWM    insulin detemir U-100  24 Units Subcutaneous BID    levETIRAcetam  1,000 mg Oral BID    mirtazapine  15 mg Oral Nightly    polyethylene glycol  17 g Oral Daily    senna-docusate 8.6-50 mg  2 tablet Oral BID    sodium chloride 0.9%  10 mL Intravenous Q6H    valsartan  40 mg Oral BID    warfarin  6 mg Oral Daily     PRN Meds:acetaminophen, dextrose 10%, dextrose 10%, glucagon (human recombinant), glucose, glucose, HYDROcodone-acetaminophen, insulin aspart U-100, Flushing PICC Protocol **AND** sodium chloride 0.9% **AND** sodium chloride 0.9%    Review of patient's allergies indicates:   Allergen Reactions    Bumex [bumetanide] Hives    Torsemide Hives     Objective:     Vital Signs (Most Recent):  Temp: 98.3 °F (36.8 °C) (08/03/23 0750)  Pulse: (!) 118 (08/03/23 0750)  Resp: 18 (08/03/23 0750)  BP: (!) 106/51 (08/03/23 0752)  SpO2: 97 % (08/03/23 0750) Vital Signs (24h Range):  Temp:  [98.2 °F (36.8 °C)-98.9 °F (37.2 °C)] 98.3 °F (36.8 °C)  Pulse:  [110-136] 118  Resp:  [16-20] 18  SpO2:  [95 %-99 %] 97 %  BP: ()/(0-70) 106/51     Patient Vitals for the past 72 hrs (Last 3 readings):   Weight   08/02/23 0750 90.3 kg (199 lb 1.2 oz)   08/01/23 0750 90.7 kg (199 lb 15.3 oz)   07/31/23 1450 90.3 kg (199 lb)        Body mass index is 24.88 kg/m².      Intake/Output Summary (Last 24 hours) at 8/3/2023 0913  Last data filed at 8/3/2023 0637  Gross per 24 hour   Intake 1062 ml   Output 2600 ml   Net -1538 ml         Hemodynamic Parameters:       Telemetry: ST       Physical Exam  Constitutional:       Appearance: Normal appearance.   HENT:      Head: Normocephalic and atraumatic.   Eyes:      Conjunctiva/sclera: Conjunctivae normal.      Pupils: Pupils are equal, round, and reactive to light.   Neck:      Comments: Do not appreciate elevated neck veins  Cardiovascular:      Rate and Rhythm: Regular rhythm. Tachycardia present.      Comments: Smooth VAD hum  Pulmonary:      Effort: Pulmonary effort is normal.      Breath sounds: Normal breath sounds.   Abdominal:      General: Bowel sounds are normal.      Palpations: Abdomen is soft.   Musculoskeletal:         General: No swelling. Normal range of motion.      Cervical back: Normal range of motion and neck supple.   Skin:     General: Skin is warm and dry.      Capillary Refill: Capillary refill takes 2 to 3 seconds.   Neurological:      General: No focal deficit present.      Mental Status: He is alert and oriented to person, place, and time.   Psychiatric:         Mood and Affect: Mood normal.         Behavior: Behavior normal.         Thought Content: Thought content normal.         Judgment: Judgment normal.            Significant Labs:  CBC:  Recent Labs   Lab 08/01/23 0342 08/02/23  0604 08/03/23  0441   WBC 34.78* 24.71* 20.28*   RBC 3.97* 4.09* 3.84*   HGB 10.9* 11.1* 10.4*   HCT 32.8* 33.5* 32.6*   * 417 376   MCV 83 82 85   MCH 27.5 27.1 27.1   MCHC 33.2 33.1 31.9*       BNP:  Recent Labs   Lab 07/28/23  0431 07/31/23  0501 08/02/23  0604   * 159* 82       CMP:  Recent Labs   Lab 08/01/23  0342 08/02/23  0604 08/03/23  0441   * 184* 169*   CALCIUM 9.5 9.3 9.0   ALBUMIN 2.4* 2.4* 2.3*   PROT 7.5 7.4 7.2   * 135* 133*   K 4.7 4.3 4.1    CO2 22* 22* 22*    104 101   BUN 40* 32* 27*   CREATININE 1.3 1.4 1.2   ALKPHOS 151* 137* 159*   ALT 31 22 29   AST 55* 39 54*   BILITOT 0.5 0.7 0.7        Coagulation:   Recent Labs   Lab 07/31/23  0501 08/01/23  0716 08/02/23  0604 08/03/23  0441   INR 3.4* 2.5* 1.8*  1.8* 1.5*  1.5*   APTT 33.8*  --  30.6 30.3       LDH:  Recent Labs   Lab 08/01/23  0342 08/02/23  0604 08/03/23  0441   * 359* 389*       Microbiology:  Microbiology Results (last 7 days)       Procedure Component Value Units Date/Time    Blood culture [[id number]] Collected: 07/29/23 1130    Order Status: Completed Specimen: Blood Updated: 08/02/23 1412     Blood Culture, Routine No Growth to date      No Growth to date      No Growth to date      No Growth to date      No Growth to date    Narrative:      Please obtain 2 sets after RIJ is pulled, thank you    Blood culture [[id number]] Collected: 07/29/23 1132    Order Status: Completed Specimen: Blood Updated: 08/02/23 1412     Blood Culture, Routine No Growth to date      No Growth to date      No Growth to date      No Growth to date      No Growth to date    Narrative:      Please obtain 2 sets after RIJ is pulled, thank you  Rt hand    Blood culture [[id number]] Collected: 07/30/23 0810    Order Status: Completed Specimen: Blood Updated: 08/02/23 1012     Blood Culture, Routine No Growth to date      No Growth to date      No Growth to date      No Growth to date    Blood culture [[id number]] Collected: 07/30/23 0810    Order Status: Completed Specimen: Blood Updated: 08/02/23 1012     Blood Culture, Routine No Growth to date      No Growth to date      No Growth to date      No Growth to date    Blood culture [[id number]]  (Abnormal) Collected: 07/28/23 0940    Order Status: Completed Specimen: Blood from Peripheral, Left Hand Updated: 08/01/23 0834     Blood Culture, Routine Gram stain aer bottle: Gram positive cocci in clusters resembling Staph      Positive results previously  called 07/30/2023  13:09      STAPHYLOCOCCUS AUREUS  ID consult required at [facility].  For susceptibility see order #[id number]      Blood culture [[id number]]  (Abnormal)  (Susceptibility) Collected: 07/28/23 0939    Order Status: Completed Specimen: Blood from Antecubital, Left Arm Updated: 07/31/23 1026     Blood Culture, Routine Gram stain aer bottle: Gram positive cocci in clusters resembling Staph      Positive results previously called 07/29/2023  21:20      STAPHYLOCOCCUS AUREUS  ID consult required at [facility].      Blood culture [[id number]] Collected: 07/25/23 0450    Order Status: Completed Specimen: Blood Updated: 07/30/23 0612     Blood Culture, Routine No growth after 5 days.    Blood culture [[id number]]  (Abnormal)  (Susceptibility) Collected: 07/25/23 0451    Order Status: Completed Specimen: Blood Updated: 07/29/23 1045     Blood Culture, Routine Gram stain aer bottle: Gram positive cocci in clusters resembling Staph      Results called to and read back by:Laura Kilgore RN 07/27/2023  13:01      STAPHYLOCOCCUS AUREUS  ID consult required at [facility] and [facility].      MRSA/SA Rapid ID by PCR from Blood culture [[id number]]  (Abnormal) Collected: 07/25/23 0451    Order Status: Completed Updated: 07/27/23 1438     Staph aureus ID by PCR Positive     Methicillin Resistant ID by PCR Negative            I have reviewed all pertinent labs within the past 24 hours.    Estimated Creatinine Clearance: 99.8 mL/min (based on SCr of 1.2 mg/dL).    Diagnostic Results:  I have reviewed and interpreted all pertinent imaging results/findings within the past 24 hours.

## 2023-08-03 NOTE — NURSING
Notified  MARY Fregoso and MD. Marques HR stays in 120s most of time, asymptomatic, Mag 1.9, replacement ordered. WCTM.

## 2023-08-03 NOTE — ASSESSMENT & PLAN NOTE
I independently reviewed patient's lab work and images as documented. 37 yo male with LVAD c/b prior MSSA DLESI s/p treatment admitted with MSSA bacteremia 2/2 to DLES. Admit blcx and repeat DLES cx with MSSA; prior PICC removed. Also found to have COVID, CXR with L >R opacity; s/p remdesivir and dex. Denies new joint/back pain or diarrhea. CVC (placed during hospital stay) removed on 7/29, repeat blcx post-line removal ngtd. TTE unrevealing for vegetations. Recent CXR with stable L>R opacity (stable) with pleural effusion.  CT with L loculated effusion (lateral heart border and base); prior stranding noted around DL site resolved. Plan for IR aspiration today.     Recommendations:  -continue ancef 8g CI IV daily, anticipate 6w course, maria isabel 9/10   -will likely need suppressive abx therapy once pt completes his course of IV abx  -follow up repeat blcx, so far ngtd  -favor cx from IR aspiration      Outpatient Antibiotic Therapy Plan:    Please send referral to [facility].    1) Infection: MSSA bacteremia/LVAD DLESI    2) Discharge Antibiotics:    Intravenous antibiotics:   Ancef 8g continuous infusion daily      3) Therapy Duration:  6w    Estimated end date of IV antibiotics: 9/10/23    4) Outpatient Weekly Labs:    Order the following labs to be drawn on Mondays:    CBC   CMP       5) Fax Lab Results to Infectious Diseases Provider: Nicolasa/Beverly    [facility] Fax Number: [phone]    6) Outpatient Infectious Diseases Follow-up     Follow-up appointment will be arranged by the ID clinic and will be found in the patient's appointments tab.     Prior to discharge, please ensure the patient's follow-up has been scheduled.     If there is still no follow-up scheduled prior to discharge, please send an EPIC message to Lynn Sheppard in Infectious Diseases.

## 2023-08-03 NOTE — SUBJECTIVE & OBJECTIVE
Interval History: No fevers documented overnight.   Plan for IR aspiration for loculated fluid collection. Pt reports tolerating abx without issues.     Review of Systems  Objective:     Vital Signs (Most Recent):  Temp: 98.3 °F (36.8 °C) (08/03/23 0750)  Pulse: (!) 118 (08/03/23 0750)  Resp: 18 (08/03/23 0750)  BP: (!) 106/51 (08/03/23 0752)  SpO2: 97 % (08/03/23 0750) Vital Signs (24h Range):  Temp:  [98.2 °F (36.8 °C)-98.9 °F (37.2 °C)] 98.3 °F (36.8 °C)  Pulse:  [112-136] 118  Resp:  [16-20] 18  SpO2:  [95 %-99 %] 97 %  BP: ()/(0-70) 106/51     Weight: 90.3 kg (199 lb 1.2 oz)  Body mass index is 24.88 kg/m².    Estimated Creatinine Clearance: 99.8 mL/min (based on SCr of 1.2 mg/dL).     Physical Exam  Constitutional:       General: He is not in acute distress.     Appearance: He is not ill-appearing or toxic-appearing.   Pulmonary:      Effort: Pulmonary effort is normal.   Chest:      Chest wall: Tenderness (L pleuritic pain, stable) present.   Abdominal:      Comments: LVAD dressed   Neurological:      Mental Status: He is alert.          Significant Labs:   Microbiology Results (last 7 days)       Procedure Component Value Units Date/Time    Blood culture [[id number]] Collected: 07/30/23 0810    Order Status: Completed Specimen: Blood Updated: 08/03/23 1012     Blood Culture, Routine No Growth to date      No Growth to date      No Growth to date      No Growth to date      No Growth to date    Blood culture [[id number]] Collected: 07/30/23 0810    Order Status: Completed Specimen: Blood Updated: 08/03/23 1012     Blood Culture, Routine No Growth to date      No Growth to date      No Growth to date      No Growth to date      No Growth to date    Aerobic culture [[id number]]     Order Status: No result Specimen: Pleural Fluid     Culture, Anaerobic [[id number]]     Order Status: No result Specimen: Pleural Fluid     Gram stain [[id number]]     Order Status: No result Specimen: Pleural Fluid     AFB Culture  & Smear [[id number]]     Order Status: No result Specimen: Pleural Fluid     Blood culture [[id number]] Collected: 07/29/23 1130    Order Status: Completed Specimen: Blood Updated: 08/02/23 1412     Blood Culture, Routine No Growth to date      No Growth to date      No Growth to date      No Growth to date      No Growth to date    Narrative:      Please obtain 2 sets after RIJ is pulled, thank you    Blood culture [[id number]] Collected: 07/29/23 1132    Order Status: Completed Specimen: Blood Updated: 08/02/23 1412     Blood Culture, Routine No Growth to date      No Growth to date      No Growth to date      No Growth to date      No Growth to date    Narrative:      Please obtain 2 sets after RIJ is pulled, thank you  Rt hand    Blood culture [[id number]]  (Abnormal) Collected: 07/28/23 0940    Order Status: Completed Specimen: Blood from Peripheral, Left Hand Updated: 08/01/23 0834     Blood Culture, Routine Gram stain aer bottle: Gram positive cocci in clusters resembling Staph      Positive results previously called 07/30/2023  13:09      STAPHYLOCOCCUS AUREUS  ID consult required at [facility].  For susceptibility see order #[id number]      Blood culture [[id number]]  (Abnormal)  (Susceptibility) Collected: 07/28/23 0939    Order Status: Completed Specimen: Blood from Antecubital, Left Arm Updated: 07/31/23 1026     Blood Culture, Routine Gram stain aer bottle: Gram positive cocci in clusters resembling Staph      Positive results previously called 07/29/2023  21:20      STAPHYLOCOCCUS AUREUS  ID consult required at [facility].      Blood culture [[id number]] Collected: 07/25/23 0450    Order Status: Completed Specimen: Blood Updated: 07/30/23 0612     Blood Culture, Routine No growth after 5 days.    Blood culture [[id number]]  (Abnormal)  (Susceptibility) Collected: 07/25/23 0451    Order Status: Completed Specimen: Blood Updated: 07/29/23 1045      Blood Culture, Routine Gram stain aer bottle: Gram positive cocci in clusters resembling Staph      Results called to and read back by:Laura Kilgore RN 07/27/2023  13:01      STAPHYLOCOCCUS AUREUS  ID consult required at [facility].Jeanette Thomas and Alexandra hilton.      MRSA/SA Rapid ID by PCR from Blood culture [[id number]]  (Abnormal) Collected: 07/25/23 0451    Order Status: Completed Updated: 07/27/23 1438     Staph aureus ID by PCR Positive     Methicillin Resistant ID by PCR Negative            Significant Imaging: I have reviewed all pertinent imaging results/findings within the past 24 hours.

## 2023-08-03 NOTE — PLAN OF CARE
Pt arrived to  for left chest tube. Pt oriented to unit and staff. Plan of care reviewed with patient, patient verbalizes understanding. Comfort measures utilized.  Blankets applied. Pt prepped and draped utilizing standard sterile technique. Patient placed on continuous monitoring. Timeouts completed utilizing standard universal time-out, per department and facility policy. RN to remain at bedside, continuous monitoring maintained. Pt resting comfortably. Denies pain/discomfort. Will continue to monitor. See flow sheets for monitoring, medication administration, and updates.

## 2023-08-04 LAB
ALBUMIN SERPL BCP-MCNC: 2.3 G/DL (ref 3.5–5.2)
ALP SERPL-CCNC: 155 U/L (ref 55–135)
ALT SERPL W/O P-5'-P-CCNC: 18 U/L (ref 10–44)
ANION GAP SERPL CALC-SCNC: 9 MMOL/L (ref 8–16)
APPEARANCE FLD: NORMAL
APTT PPP: 33.2 SEC (ref 21–32)
AST SERPL-CCNC: 31 U/L (ref 10–40)
BACTERIA BLD CULT: NORMAL
BACTERIA BLD CULT: NORMAL
BASOPHILS # BLD AUTO: 0.04 K/UL (ref 0–0.2)
BASOPHILS NFR BLD: 0.2 % (ref 0–1.9)
BILIRUB SERPL-MCNC: 0.8 MG/DL (ref 0.1–1)
BNP SERPL-MCNC: 104 PG/ML (ref 0–99)
BODY FLD TYPE: NORMAL
BODY FLUID COMMENTS: NORMAL
BUN SERPL-MCNC: 24 MG/DL (ref 6–20)
CALCIUM SERPL-MCNC: 9.3 MG/DL (ref 8.7–10.5)
CHLORIDE SERPL-SCNC: 100 MMOL/L (ref 95–110)
CO2 SERPL-SCNC: 24 MMOL/L (ref 23–29)
COLOR FLD: NORMAL
CREAT SERPL-MCNC: 1.1 MG/DL (ref 0.5–1.4)
CRP SERPL-MCNC: 135.8 MG/L (ref 0–8.2)
DIFFERENTIAL METHOD: ABNORMAL
EOSINOPHIL # BLD AUTO: 0.1 K/UL (ref 0–0.5)
EOSINOPHIL NFR BLD: 0.5 % (ref 0–8)
ERYTHROCYTE [DISTWIDTH] IN BLOOD BY AUTOMATED COUNT: 23.1 % (ref 11.5–14.5)
EST. GFR  (NO RACE VARIABLE): >60 ML/MIN/1.73 M^2
GLUCOSE SERPL-MCNC: 186 MG/DL (ref 70–110)
HCT VFR BLD AUTO: 31 % (ref 40–54)
HGB BLD-MCNC: 10 G/DL (ref 14–18)
IMM GRANULOCYTES # BLD AUTO: 0.18 K/UL (ref 0–0.04)
IMM GRANULOCYTES NFR BLD AUTO: 1 % (ref 0–0.5)
INR PPP: 1.5 (ref 0.8–1.2)
INR PPP: 1.5 (ref 0.8–1.2)
LDH SERPL L TO P-CCNC: 364 U/L (ref 110–260)
LYMPHOCYTES # BLD AUTO: 2.6 K/UL (ref 1–4.8)
LYMPHOCYTES NFR BLD: 13.7 % (ref 18–48)
LYMPHOCYTES NFR FLD MANUAL: 1 %
MAGNESIUM SERPL-MCNC: 2 MG/DL (ref 1.6–2.6)
MCH RBC QN AUTO: 27.5 PG (ref 27–31)
MCHC RBC AUTO-ENTMCNC: 32.3 G/DL (ref 32–36)
MCV RBC AUTO: 85 FL (ref 82–98)
MONOCYTES # BLD AUTO: 1.4 K/UL (ref 0.3–1)
MONOCYTES NFR BLD: 7.3 % (ref 4–15)
MONOS+MACROS NFR FLD MANUAL: 3 %
NEUTROPHILS # BLD AUTO: 14.4 K/UL (ref 1.8–7.7)
NEUTROPHILS NFR BLD: 77.3 % (ref 38–73)
NEUTROPHILS NFR FLD MANUAL: 96 %
NRBC BLD-RTO: 0 /100 WBC
PHOSPHATE SERPL-MCNC: 2.8 MG/DL (ref 2.7–4.5)
PLATELET # BLD AUTO: 324 K/UL (ref 150–450)
PMV BLD AUTO: 9.8 FL (ref 9.2–12.9)
POCT GLUCOSE: 138 MG/DL (ref 70–110)
POCT GLUCOSE: 226 MG/DL (ref 70–110)
POCT GLUCOSE: 231 MG/DL (ref 70–110)
POCT GLUCOSE: 255 MG/DL (ref 70–110)
POCT GLUCOSE: 96 MG/DL (ref 70–110)
POTASSIUM SERPL-SCNC: 4.1 MMOL/L (ref 3.5–5.1)
PREALB SERPL-MCNC: 21 MG/DL (ref 20–43)
PROT SERPL-MCNC: 7.4 G/DL (ref 6–8.4)
PROTHROMBIN TIME: 16.2 SEC (ref 9–12.5)
PROTHROMBIN TIME: 16.2 SEC (ref 9–12.5)
RBC # BLD AUTO: 3.63 M/UL (ref 4.6–6.2)
SODIUM SERPL-SCNC: 133 MMOL/L (ref 136–145)
WBC # BLD AUTO: 18.63 K/UL (ref 3.9–12.7)
WBC # FLD: NORMAL /CU MM

## 2023-08-04 PROCEDURE — 25000003 PHARM REV CODE 250: Performed by: STUDENT IN AN ORGANIZED HEALTH CARE EDUCATION/TRAINING PROGRAM

## 2023-08-04 PROCEDURE — 25000003 PHARM REV CODE 250: Performed by: INTERNAL MEDICINE

## 2023-08-04 PROCEDURE — 84134 ASSAY OF PREALBUMIN: CPT | Performed by: HOSPITALIST

## 2023-08-04 PROCEDURE — 20600001 HC STEP DOWN PRIVATE ROOM

## 2023-08-04 PROCEDURE — 25000003 PHARM REV CODE 250: Performed by: NURSE PRACTITIONER

## 2023-08-04 PROCEDURE — 63600175 PHARM REV CODE 636 W HCPCS: Performed by: STUDENT IN AN ORGANIZED HEALTH CARE EDUCATION/TRAINING PROGRAM

## 2023-08-04 PROCEDURE — 83880 ASSAY OF NATRIURETIC PEPTIDE: CPT | Performed by: HOSPITALIST

## 2023-08-04 PROCEDURE — A4216 STERILE WATER/SALINE, 10 ML: HCPCS | Performed by: INTERNAL MEDICINE

## 2023-08-04 PROCEDURE — 93750 PR INTERROGATE VENT ASSIST DEV, IN PERSON, W PHYSICIAN ANALYSIS: ICD-10-PCS | Mod: ,,, | Performed by: INTERNAL MEDICINE

## 2023-08-04 PROCEDURE — 25000242 PHARM REV CODE 250 ALT 637 W/ HCPCS: Performed by: STUDENT IN AN ORGANIZED HEALTH CARE EDUCATION/TRAINING PROGRAM

## 2023-08-04 PROCEDURE — A4217 STERILE WATER/SALINE, 500 ML: HCPCS | Performed by: STUDENT IN AN ORGANIZED HEALTH CARE EDUCATION/TRAINING PROGRAM

## 2023-08-04 PROCEDURE — 84100 ASSAY OF PHOSPHORUS: CPT | Performed by: HOSPITALIST

## 2023-08-04 PROCEDURE — 63600175 PHARM REV CODE 636 W HCPCS: Performed by: HOSPITALIST

## 2023-08-04 PROCEDURE — 83615 LACTATE (LD) (LDH) ENZYME: CPT | Performed by: HOSPITALIST

## 2023-08-04 PROCEDURE — 25000003 PHARM REV CODE 250: Performed by: HOSPITALIST

## 2023-08-04 PROCEDURE — 27000207 HC ISOLATION

## 2023-08-04 PROCEDURE — 86140 C-REACTIVE PROTEIN: CPT | Performed by: HOSPITALIST

## 2023-08-04 PROCEDURE — 99232 SBSQ HOSP IP/OBS MODERATE 35: CPT | Mod: ,,, | Performed by: EMERGENCY MEDICINE

## 2023-08-04 PROCEDURE — 80053 COMPREHEN METABOLIC PANEL: CPT | Performed by: NURSE PRACTITIONER

## 2023-08-04 PROCEDURE — 27000248 HC VAD-ADDITIONAL DAY

## 2023-08-04 PROCEDURE — 99233 SBSQ HOSP IP/OBS HIGH 50: CPT | Mod: ,,, | Performed by: STUDENT IN AN ORGANIZED HEALTH CARE EDUCATION/TRAINING PROGRAM

## 2023-08-04 PROCEDURE — 99233 PR SUBSEQUENT HOSPITAL CARE,LEVL III: ICD-10-PCS | Mod: 95,,, | Performed by: PHYSICIAN ASSISTANT

## 2023-08-04 PROCEDURE — 99233 PR SUBSEQUENT HOSPITAL CARE,LEVL III: ICD-10-PCS | Mod: ,,, | Performed by: STUDENT IN AN ORGANIZED HEALTH CARE EDUCATION/TRAINING PROGRAM

## 2023-08-04 PROCEDURE — 85610 PROTHROMBIN TIME: CPT | Performed by: HOSPITALIST

## 2023-08-04 PROCEDURE — 63600175 PHARM REV CODE 636 W HCPCS: Performed by: INTERNAL MEDICINE

## 2023-08-04 PROCEDURE — 99233 SBSQ HOSP IP/OBS HIGH 50: CPT | Mod: 95,,, | Performed by: PHYSICIAN ASSISTANT

## 2023-08-04 PROCEDURE — 83735 ASSAY OF MAGNESIUM: CPT | Performed by: HOSPITALIST

## 2023-08-04 PROCEDURE — 85025 COMPLETE CBC W/AUTO DIFF WBC: CPT | Performed by: NURSE PRACTITIONER

## 2023-08-04 PROCEDURE — 93750 INTERROGATION VAD IN PERSON: CPT | Mod: ,,, | Performed by: INTERNAL MEDICINE

## 2023-08-04 PROCEDURE — 94761 N-INVAS EAR/PLS OXIMETRY MLT: CPT

## 2023-08-04 PROCEDURE — 99232 PR SUBSEQUENT HOSPITAL CARE,LEVL II: ICD-10-PCS | Mod: ,,, | Performed by: EMERGENCY MEDICINE

## 2023-08-04 PROCEDURE — 85730 THROMBOPLASTIN TIME PARTIAL: CPT | Performed by: STUDENT IN AN ORGANIZED HEALTH CARE EDUCATION/TRAINING PROGRAM

## 2023-08-04 PROCEDURE — 63600175 PHARM REV CODE 636 W HCPCS: Mod: JZ,JG | Performed by: STUDENT IN AN ORGANIZED HEALTH CARE EDUCATION/TRAINING PROGRAM

## 2023-08-04 RX ORDER — MORPHINE SULFATE 2 MG/ML
2 INJECTION, SOLUTION INTRAMUSCULAR; INTRAVENOUS ONCE
Status: COMPLETED | OUTPATIENT
Start: 2023-08-04 | End: 2023-08-04

## 2023-08-04 RX ADMIN — ALTEPLASE 10 MG: 2.2 INJECTION, POWDER, LYOPHILIZED, FOR SOLUTION INTRAVENOUS at 06:08

## 2023-08-04 RX ADMIN — WARFARIN SODIUM 6 MG: 3 TABLET ORAL at 05:08

## 2023-08-04 RX ADMIN — INSULIN ASPART 6 UNITS: 100 INJECTION, SOLUTION INTRAVENOUS; SUBCUTANEOUS at 05:08

## 2023-08-04 RX ADMIN — MILRINONE LACTATE 0.25 MCG/KG/MIN: 0.2 INJECTION, SOLUTION INTRAVENOUS at 06:08

## 2023-08-04 RX ADMIN — Medication 10 ML: at 06:08

## 2023-08-04 RX ADMIN — INSULIN ASPART 6 UNITS: 100 INJECTION, SOLUTION INTRAVENOUS; SUBCUTANEOUS at 08:08

## 2023-08-04 RX ADMIN — CEFAZOLIN 8 G: 2 INJECTION, POWDER, FOR SOLUTION INTRAMUSCULAR; INTRAVENOUS at 10:08

## 2023-08-04 RX ADMIN — INSULIN DETEMIR 24 UNITS: 100 INJECTION, SOLUTION SUBCUTANEOUS at 08:08

## 2023-08-04 RX ADMIN — ACETAMINOPHEN 650 MG: 325 TABLET ORAL at 04:08

## 2023-08-04 RX ADMIN — MIRTAZAPINE 15 MG: 15 TABLET, FILM COATED ORAL at 11:08

## 2023-08-04 RX ADMIN — ASPIRIN 81 MG: 81 TABLET, COATED ORAL at 08:08

## 2023-08-04 RX ADMIN — INSULIN ASPART 20 UNITS: 100 INJECTION, SOLUTION INTRAVENOUS; SUBCUTANEOUS at 08:08

## 2023-08-04 RX ADMIN — VALSARTAN 40 MG: 40 TABLET, FILM COATED ORAL at 11:08

## 2023-08-04 RX ADMIN — LEVETIRACETAM 1000 MG: 500 TABLET, FILM COATED ORAL at 08:08

## 2023-08-04 RX ADMIN — LEVETIRACETAM 1000 MG: 500 TABLET, FILM COATED ORAL at 11:08

## 2023-08-04 RX ADMIN — MILRINONE LACTATE 0.25 MCG/KG/MIN: 0.2 INJECTION, SOLUTION INTRAVENOUS at 11:08

## 2023-08-04 RX ADMIN — VALSARTAN 40 MG: 40 TABLET, FILM COATED ORAL at 08:08

## 2023-08-04 RX ADMIN — FUROSEMIDE 80 MG: 80 TABLET ORAL at 08:08

## 2023-08-04 RX ADMIN — INSULIN ASPART 20 UNITS: 100 INJECTION, SOLUTION INTRAVENOUS; SUBCUTANEOUS at 05:08

## 2023-08-04 RX ADMIN — HYDROCODONE BITARTRATE AND ACETAMINOPHEN 1 TABLET: 5; 325 TABLET ORAL at 10:08

## 2023-08-04 RX ADMIN — Medication 10 ML: at 12:08

## 2023-08-04 RX ADMIN — INSULIN ASPART 6 UNITS: 100 INJECTION, SOLUTION INTRAVENOUS; SUBCUTANEOUS at 02:08

## 2023-08-04 RX ADMIN — MORPHINE SULFATE 2 MG: 2 INJECTION, SOLUTION INTRAMUSCULAR; INTRAVENOUS at 11:08

## 2023-08-04 RX ADMIN — ACETAMINOPHEN 650 MG: 325 TABLET ORAL at 05:08

## 2023-08-04 RX ADMIN — HYDROCODONE BITARTRATE AND ACETAMINOPHEN 1 TABLET: 5; 325 TABLET ORAL at 07:08

## 2023-08-04 RX ADMIN — DORNASE ALFA 5 MG: 1 SOLUTION RESPIRATORY (INHALATION) at 06:08

## 2023-08-04 RX ADMIN — INSULIN DETEMIR 24 UNITS: 100 INJECTION, SOLUTION SUBCUTANEOUS at 10:08

## 2023-08-04 NOTE — ASSESSMENT & PLAN NOTE
-Patient presents with elevated fevers, white blood cell count, elevated lactic acid.   -Possible Secondary to COVID-19 infection.  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 Repeated 7/29 with NGTD. Repeated again 7/30 NGTD  -PICC line replaced 8/1  -ECHO done 7/31 and no vegetations appreciated   -CT 8/1 with loculated fluid along Left lateral hear border, thoracic surgery consulted, not a candidate for VATS. Chest tube placed with IR 8/3, pleural fluid cultures positive for GPC  -Continue Ancef 8g IV q 24 hours for total of 6 weeks with end date 9/10

## 2023-08-04 NOTE — PLAN OF CARE
Plan of care discussed with patient. Patient ambulating independently, fall precautions in place. LVAD DP and numbers WNL, smooth LVAD hum. Patient has no complaints of pain. Discussed medications and care. Patient has no questions at this time. Will continue to monitor.     PRN pain meds given with relief. Chest tube site monitored, to -20 H2O suction, output charted. No SOB reported. BG checked & insulin given per order. Continuous ancef & milrinone gtt infusing thru PICC line.     CVP 8

## 2023-08-04 NOTE — PROGRESS NOTES
[facility] - Cardiology Stepdown  Heart Transplant  Progress Note    Patient Name: Kevan Queen  MRN: [id number]  Admission Date: 7/22/2023  Hospital Length of Stay: 13 days  Attending Physician: Marlo Marques MD  Primary Care Provider: Rosio Armendariz FNP  Principal Problem:Sepsis    Subjective:     Interval History: IR placed left chest tube yesterday. Chest tube output of 110 cc overnight. Pleural fluid culture with GPC. ID following, remains on IV Ancef 8mg QD. Tachycardic with HR in 120s over the past 2 days, possibly 2/2 infection.      Continuous Infusions:   milrinone 20mg/100ml D5W (200mcg/ml) 0.25 mcg/kg/min (08/03/23 2030)     Scheduled Meds:   aspirin  81 mg Oral Daily    busPIRone  10 mg Oral BID    ceFAZolin (ANCEF) 8 g in dextrose 5 % (D5W) 500 mL CONTINUOUS INFUSION  8 g Intravenous Q24H    furosemide  80 mg Oral Daily    insulin aspart U-100  20 Units Subcutaneous TIDWM    insulin detemir U-100  24 Units Subcutaneous BID    levETIRAcetam  1,000 mg Oral BID    mirtazapine  15 mg Oral Nightly    polyethylene glycol  17 g Oral Daily    senna-docusate 8.6-50 mg  2 tablet Oral BID    sodium chloride 0.9%  10 mL Intravenous Q6H    valsartan  40 mg Oral BID    warfarin  6 mg Oral Daily     PRN Meds:acetaminophen, dextrose 10%, dextrose 10%, glucagon (human recombinant), glucose, glucose, HYDROcodone-acetaminophen, insulin aspart U-100, Flushing PICC Protocol **AND** sodium chloride 0.9% **AND** sodium chloride 0.9%    Review of patient's allergies indicates:   Allergen Reactions    Bumex [bumetanide] Hives    Torsemide Hives     Objective:     Vital Signs (Most Recent):  Temp: 98.6 °F (37 °C) (08/04/23 0723)  Pulse: (!) 124 (08/04/23 0723)  Resp: 20 (08/04/23 1036)  BP: (!) 80/0 (08/04/23 0723)  SpO2: 96 % (08/04/23 1000) Vital Signs (24h Range):  Temp:  [97.9 °F (36.6 °C)-99.5 °F (37.5 °C)] 98.6 °F (37 °C)  Pulse:  [112-129] 124  Resp:  [16-22] 20  SpO2:  [95 %-100 %] 96 %  BP:  ()/(0-66) 80/0     Patient Vitals for the past 72 hrs (Last 3 readings):   Weight   08/03/23 0750 90.5 kg (199 lb 8.3 oz)   08/02/23 0750 90.3 kg (199 lb 1.2 oz)       Body mass index is 24.94 kg/m².      Intake/Output Summary (Last 24 hours) at 8/4/2023 1043  Last data filed at 8/4/2023 0859  Gross per 24 hour   Intake 822 ml   Output 2160 ml   Net -1338 ml         Hemodynamic Parameters:       Telemetry: ST       Physical Exam  Constitutional:       Appearance: Normal appearance.   HENT:      Head: Normocephalic and atraumatic.   Eyes:      Conjunctiva/sclera: Conjunctivae normal.      Pupils: Pupils are equal, round, and reactive to light.   Neck:      Comments: Do not appreciate elevated neck veins  Cardiovascular:      Rate and Rhythm: Regular rhythm. Tachycardia present.      Comments: Smooth VAD hum  Pulmonary:      Effort: Pulmonary effort is normal.      Breath sounds: Normal breath sounds.   Abdominal:      General: Bowel sounds are normal.      Palpations: Abdomen is soft.   Musculoskeletal:         General: No swelling. Normal range of motion.      Cervical back: Normal range of motion and neck supple.   Skin:     General: Skin is warm and dry.      Capillary Refill: Capillary refill takes 2 to 3 seconds.   Neurological:      General: No focal deficit present.      Mental Status: He is alert and oriented to person, place, and time.   Psychiatric:         Mood and Affect: Mood normal.         Behavior: Behavior normal.         Thought Content: Thought content normal.         Judgment: Judgment normal.            Significant Labs:  CBC:  Recent Labs   Lab 08/02/23  0604 08/03/23  0441 08/04/23  0426   WBC 24.71* 20.28* 18.63*   RBC 4.09* 3.84* 3.63*   HGB 11.1* 10.4* 10.0*   HCT 33.5* 32.6* 31.0*    376 324   MCV 82 85 85   MCH 27.1 27.1 27.5   MCHC 33.1 31.9* 32.3       BNP:  Recent Labs   Lab 07/31/23  0501 08/02/23  0604 08/04/23  0426   * 82 104*       CMP:  Recent Labs   Lab  08/02/23  0604 08/03/23  0441 08/04/23  0426   * 169* 186*   CALCIUM 9.3 9.0 9.3   ALBUMIN 2.4* 2.3* 2.3*   PROT 7.4 7.2 7.4   * 133* 133*   K 4.3 4.1 4.1   CO2 22* 22* 24    101 100   BUN 32* 27* 24*   CREATININE 1.4 1.2 1.1   ALKPHOS 137* 159* 155*   ALT 22 29 18   AST 39 54* 31   BILITOT 0.7 0.7 0.8        Coagulation:   Recent Labs   Lab 08/02/23  0604 08/03/23  0441 08/04/23  0426   INR 1.8*  1.8* 1.5*  1.5* 1.5*  1.5*   APTT 30.6 30.3 33.2*       LDH:  Recent Labs   Lab 08/02/23  0604 08/03/23  0441 08/04/23  0426   * 389* 364*       Microbiology:  Microbiology Results (last 7 days)       Procedure Component Value Units Date/Time    Blood culture [[id number]] Collected: 07/30/23 0810    Order Status: Completed Specimen: Blood Updated: 08/04/23 1012     Blood Culture, Routine No growth after 5 days.    Blood culture [[id number]] Collected: 07/30/23 0810    Order Status: Completed Specimen: Blood Updated: 08/04/23 1012     Blood Culture, Routine No growth after 5 days.    Aerobic culture [[id number]] Collected: 08/03/23 1655    Order Status: Completed Specimen: Pleural Fluid Updated: 08/04/23 0858     Aerobic Bacterial Culture No growth    Gram stain [[id number]] Collected: 08/03/23 1655    Order Status: Completed Specimen: Pleural Fluid Updated: 08/03/23 2243     Gram Stain Result Rare WBC's      Rare Gram positive cocci    AFB Culture & Smear [[id number]] Collected: 08/03/23 1655    Order Status: Sent Specimen: Pleural Fluid Updated: 08/03/23 1715    Culture, Anaerobic [[id number]] Collected: 08/03/23 1655    Order Status: Sent Specimen: Pleural Fluid Updated: 08/03/23 1714    Blood culture [[id number]] Collected: 07/29/23 1130    Order Status: Completed Specimen: Blood Updated: 08/03/23 1412     Blood Culture, Routine No growth after 5 days.    Narrative:      Please obtain 2 sets after RIJ is pulled, thank you    Blood culture [[id number]] Collected: 07/29/23 1132    Order Status:  Completed Specimen: Blood Updated: 08/03/23 1412     Blood Culture, Routine No growth after 5 days.    Narrative:      Please obtain 2 sets after RIJ is pulled, thank you  Rt hand    Blood culture [[id number]]  (Abnormal) Collected: 07/28/23 0940    Order Status: Completed Specimen: Blood from Peripheral, Left Hand Updated: 08/01/23 0834     Blood Culture, Routine Gram stain aer bottle: Gram positive cocci in clusters resembling Staph      Positive results previously called 07/30/2023  13:09      STAPHYLOCOCCUS AUREUS  ID consult required at [facility] and [facility].  For susceptibility see order #[id number]      Blood culture [[id number]]  (Abnormal)  (Susceptibility) Collected: 07/28/23 0939    Order Status: Completed Specimen: Blood from Antecubital, Left Arm Updated: 07/31/23 1026     Blood Culture, Routine Gram stain aer bottle: Gram positive cocci in clusters resembling Staph      Positive results previously called 07/29/2023  21:20      STAPHYLOCOCCUS AUREUS  ID consult required at [facility] and [facility].      Blood culture [[id number]] Collected: 07/25/23 0450    Order Status: Completed Specimen: Blood Updated: 07/30/23 0612     Blood Culture, Routine No growth after 5 days.    Blood culture [[id number]]  (Abnormal)  (Susceptibility) Collected: 07/25/23 0451    Order Status: Completed Specimen: Blood Updated: 07/29/23 1045     Blood Culture, Routine Gram stain aer bottle: Gram positive cocci in clusters resembling Staph      Results called to and read back by:Laura Kilgore RN 07/27/2023  13:01      STAPHYLOCOCCUS AUREUS  ID consult required at [facility] and [facility].              I have reviewed all pertinent labs within the past 24 hours.    Estimated Creatinine Clearance: 108.8 mL/min (based on SCr of 1.1 mg/dL).    Diagnostic Results:  I have reviewed and interpreted all pertinent imaging results/findings within the past 24 hours.    Assessment and Plan:      Patient is a 36 yo black male with stage d HFrEF, NICM, ? 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 He underwent RVAD removal and chest closure 6/30/2022.  He was weaned off  but he had to restarted due to RVF. He was eventually transitioned to milrinone (secondary to  shortage) now on 0.25 mcg/kg/min.  Patient also has history of driveline infection.  Patient presented to the emergency room today because of pleuritic chest pain that has been going on for the last 3 days however it was more intense today.  Also complains of having shortness of breath and fevers associated with it.  He was spiking fever of 102 in the emergency room with elevated white blood cell count up to 17,000. He was treated with Zosyn.  He was sent here for admission.  Patient was admitted a month back for COVID infection for which he received remdesivir for 3 days.  He is on doxycycline for chronic driveline infection.  He is on Milrinone for RV failure.  Patient denies having any low flow alarms on the pump.  Also denies having any lower extremity edema, increased discharge from his driveline site.  Patient has ground-glass opacities on imaging from outside hospital      * Sepsis  -Patient presents with elevated fevers, white blood cell count, elevated lactic acid.   -Possible Secondary to COVID-19 infection.  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 Repeated 7/29 with NGTD. Repeated again 7/30 NGTD  -PICC line replaced 8/1  -ECHO done 7/31 and no vegetations appreciated   -CT 8/1 with loculated fluid along Left lateral hear border, thoracic surgery consulted, not a candidate for VATS. Chest tube placed with IR 8/3, pleural fluid cultures positive for  GPC  -Continue Ancef 8g IV q 24 hours for total of 6 weeks with end date 9/10      Pleural effusion  -CT imaging this admission consistent with small left sided pleural effusion and complicated pleural around left heart border  -Pulm consulted- suspected para-pneumonic process vs empyema in setting of MSSA bacteremia  -Thoracic surgery consulted for VATS and patient not candidate  -IR consulted for CT guided chest tube placed in posterior collection.  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  Now with new gram + bacteremia  -On Cefaxolin   -ID following   -Repeat CT of C/A/P 8/1-prior stranding noted around DL site resolved    Bacteremia due to Staphylococcus  -See sepsis    Seizure-like activity  -On on Keppra    LVAD (left ventricular assist device) present  -HeartMate 3 Implanted on 6/29/2022 as DT with RV failure on milrinone at 0.25 mcg/kg/min.  -Continue Coumadin, Goal INR 2.0-3.0. subtherapeutic at 1.5 after coumadin held in setting of INR 3.4. Pharmacy to dose  -LDH is stable.  Will continue to monitor daily  -Speed set at 5100, LSL 4700 rpm  -Echo: 7/31/23 EF: 10-15%, LVEDD: 6.87 cm.  No vegation appreciated     Procedure: Device Interrogation Including analysis of device parameters  Current Settings: Ventricular Assist Device  Review of device function is stable    TXP LVAD INTERROGATIONS 8/4/2023 8/4/2023 8/3/2023 8/3/2023 8/3/2023 8/3/2023 8/3/2023   Type HeartMate3 HeartMate3 HeartMate3 HeartMate3 HeartMate3 HeartMate3 HeartMate3   Flow 4.4 4.3 4.4 4.4 4.5 4.4 4.4   Speed 5100 5100 5100 5100 5100 5100 5100   PI 3.3 4.3 3.4 3.0 4.0 3.1 4.2   Power (Serna) 3.7 2.7 3.6 3.7 3.6 3.6 3.6   LSL - 4700 4700 4700 4700 4700 4700   Low Flow Alarm - - - - - - -   High Power Alarm - - - - - - -   Pulsatility - Pulse Pulse Pulse Pulse Pulse Pulse        Type 2 diabetes mellitus with hyperglycemia  -Will keep him on sliding scale and a.c. HS    Chronic combined systolic and diastolic heart failure  -NICM  -Last 2D Echo 10/31/22: LVEF 10%, LVEDD 7.25 cm  -Euvolemic on examination today, CVP 8 via PICC  -Current diuretic regimen: Lasix 80mg Qdaily  -Status post LVAD.  -GDMT: On Valsartan, Aldactone and Toprol at home. Valsartan 40 mg po bid resumed. K+ has trended up at 4.8 - D/C'd KCL 40 mEq bid. -Monitor K+ now that ARB resumed  -2g Na dietary restriction, 1500 mL fluid restriction, strict I/Os                ANAID JosueC  Heart Transplant  [facility] - Cardiology Stepdown

## 2023-08-04 NOTE — PROGRESS NOTES
"     08/04/23 1117        VAD 06/29/22 1115 Left ventricular assist device HeartMate 3   Placement Date/Time: 06/29/22 1115   Present Prior to Hospital Arrival?: No  Inserted by: MD  VAD Type: Left ventricular assist device  VAD Brand: HeartMate 3   Site Location Abdomen right   Site Assessment Clean;Dry;Intact   Driveline Exit Site 1   Driveline Assessment Free of Kinks   Dressing Status Clean;Dry;Intact   Dressing Intervention Integrity maintained   Performed By RN   Dressing Change Schedule Daily   Dressing Change Due 08/05/23   Driveline [place] in use [place] askew   Condition CDI   Date changed 08/02/23  (refused change)     LVAD dressing change completed using sterile technique with kit. DLES is a "1" with minimal drainage noted on the drain sponge. Tolerated without any complication. No redness, or tenderness noted.  "

## 2023-08-04 NOTE — PROGRESS NOTES
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 37 yo male with LVAD c/b prior MSSA DLESI s/p treatment admitted with MSSA bacteremia 2/2 to DLES. Admit blcx and repeat DLES cx with MSSA; prior PICC removed. Also found to have COVID, CXR with L >R opacity; s/p remdesivir and dex. Denies new joint/back pain or diarrhea. CVC (placed during hospital stay) removed on 7/29, repeat blcx post-line removal ngtd. TTE unrevealing for vegetations. Recent CXR with stable L>R opacity (stable) with pleural effusion.  CT with L loculated effusion (lateral heart border and base); prior stranding noted around DL site resolved. S/p IR aspiration 8/3 - studies c/w exudate, g/s with GPC; cx in process; suspect same organism (MSSA) as isolated from blood and DLES. Pt reports interval improvement in L pleuritic chest pain since chest tube placement. Denies new joint or back pain.     Recommendations:  -continue ancef 8g CI IV daily, anticipate 6w course from chest tube placement, maria isabel 9/14   -will discuss SAT as an outpatient once completion of iv abx  -f/u cx data        Outpatient Antibiotic Therapy Plan:    Please send referral to [facility].    1) Infection: MSSA bacteremia/LVAD DLESI/empyema    2) Discharge Antibiotics:    Intravenous antibiotics:   Ancef 8g continuous infusion daily      3) Therapy Duration:  6w from chest tube placement    Estimated end date of IV antibiotics: 9/14/23    4) Outpatient Weekly Labs:    Order the following labs to be drawn on Mondays:     CBC   CMP       5) Fax Lab Results to Infectious Diseases Provider: Nicolasa    [facility] Fax Number: [phone]    6) Outpatient Infectious Diseases Follow-up     Follow-up appointment will be arranged by the ID clinic and will be found in the patient's appointments tab.     Prior to discharge, please ensure the patient's follow-up has been scheduled.     If there is still no follow-up scheduled prior to discharge, please send an EPIC message to Lynn Sheppard in Infectious Diseases.                      Thank you for your consult. I will sign off. Please contact us if you have any additional questions. Above d/w primary team.       50 minutes of total time spent on the encounter, which includes face to face time and non-face to face time preparing to see the patient (eg, review of tests), obtaining and/or reviewing separately obtained history, documenting clinical information in the electronic or other health record, independently interpreting results (not separately reported) and communicating results to the patient/family/caregiver, or care coordination (not separately reported).       Laura Baldwin MD  Infectious Disease  [facility] - Cardiology Stepdown    Subjective:     Principal Problem:Sepsis    HPI: 38 year old male with a history of CHF s/p LVAD placement in June of 2022.  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  He was apparently on room air for most of that hospital stay.  He received 3 days of remdesivir.  He was discharged on oral doxycycline to complete a 14 day course for the MSSA drive line infection.  He had tested negative for COVID-19 following his treatment.  He is re-admitted with complaints of shortness of breath, chest pains and generalized ill feeling. COVID-19 testing is positive again.  Chest x-ray shows diffuse infiltrates bilaterally.  ID is consulted to assist with his  management.      Interval History: No fevers documented overnight. S/p IR chest tube placement 8/3 - noted to have thick bloody fluid; cx in process, g/s with GPC, studies exudative. Pt reports tolerating abx without issues.     Review of Systems  Objective:     Vital Signs (Most Recent):  Temp: 98.6 °F (37 °C) (08/04/23 0723)  Pulse: (!) 124 (08/04/23 0723)  Resp: 20 (08/04/23 1036)  BP: (!) 80/0 (08/04/23 0723)  SpO2: 96 % (08/04/23 1000) Vital Signs (24h Range):  Temp:  [97.9 °F (36.6 °C)-99.5 °F (37.5 °C)] 98.6 °F (37 °C)  Pulse:  [112-129] 124  Resp:  [16-22] 20  SpO2:  [95 %-100 %] 96 %  BP: ()/(0-66) 80/0     Weight: 90.5 kg (199 lb 8.3 oz)  Body mass index is 24.94 kg/m².    Estimated Creatinine Clearance: 108.8 mL/min (based on SCr of 1.1 mg/dL).     Physical Exam  Constitutional:       General: He is not in acute distress.     Appearance: He is not ill-appearing or toxic-appearing.   Pulmonary:      Effort: Pulmonary effort is normal. No respiratory distress.      Comments: L chest tube - dark bloody fluid  Abdominal:      Comments: LVAD dressed   Musculoskeletal:      Right lower leg: No edema.      Left lower leg: No edema.   Skin:     General: Skin is warm and dry.      Comments: R picc   Neurological:      Mental Status: He is alert and oriented to person, place, and time.          Significant Labs:   Microbiology Results (last 7 days)       Procedure Component Value Units Date/Time    Blood culture [[id number]] Collected: 07/30/23 0810    Order Status: Completed Specimen: Blood Updated: 08/04/23 1012     Blood Culture, Routine No growth after 5 days.    Blood culture [[id number]] Collected: 07/30/23 0810    Order Status: Completed Specimen: Blood Updated: 08/04/23 1012     Blood Culture, Routine No growth after 5 days.    Aerobic culture [[id number]] Collected: 08/03/23 1655    Order Status: Completed Specimen: Pleural Fluid Updated: 08/04/23 0834     Aerobic Bacterial Culture No growth    Gram  stain [[id number]] Collected: 08/03/23 1655    Order Status: Completed Specimen: Pleural Fluid Updated: 08/03/23 2243     Gram Stain Result Rare WBC's      Rare Gram positive cocci    AFB Culture & Smear [[id number]] Collected: 08/03/23 1655    Order Status: Sent Specimen: Pleural Fluid Updated: 08/03/23 1715    Culture, Anaerobic [[id number]] Collected: 08/03/23 1655    Order Status: Sent Specimen: Pleural Fluid Updated: 08/03/23 1714    Blood culture [[id number]] Collected: 07/29/23 1130    Order Status: Completed Specimen: Blood Updated: 08/03/23 1412     Blood Culture, Routine No growth after 5 days.    Narrative:      Please obtain 2 sets after RIJ is pulled, thank you    Blood culture [[id number]] Collected: 07/29/23 1132    Order Status: Completed Specimen: Blood Updated: 08/03/23 1412     Blood Culture, Routine No growth after 5 days.    Narrative:      Please obtain 2 sets after RIJ is pulled, thank you  Rt hand    Blood culture [[id number]]  (Abnormal) Collected: 07/28/23 0940    Order Status: Completed Specimen: Blood from Peripheral, Left Hand Updated: 08/01/23 0834     Blood Culture, Routine Gram stain aer bottle: Gram positive cocci in clusters resembling Staph      Positive results previously called 07/30/2023  13:09      STAPHYLOCOCCUS AUREUS  ID consult required at [facility].  For susceptibility see order #[id number]      Blood culture [[id number]]  (Abnormal)  (Susceptibility) Collected: 07/28/23 0939    Order Status: Completed Specimen: Blood from Antecubital, Left Arm Updated: 07/31/23 1026     Blood Culture, Routine Gram stain aer bottle: Gram positive cocci in clusters resembling Staph      Positive results previously called 07/29/2023  21:20      STAPHYLOCOCCUS AUREUS  ID consult required at [facility].      Blood culture [[id number]] Collected: 07/25/23 0450    Order Status: Completed Specimen: Blood Updated: 07/30/23 0612     Blood  Culture, Routine No growth after 5 days.    Blood culture [[id number]]  (Abnormal)  (Susceptibility) Collected: 07/25/23 0451    Order Status: Completed Specimen: Blood Updated: 07/29/23 1045     Blood Culture, Routine Gram stain aer bottle: Gram positive cocci in clusters resembling Staph      Results called to and read back by:Laura Kilgore RN 07/27/2023  13:01      STAPHYLOCOCCUS AUREUS  ID consult required at [facility].Critical access hospital,[place] and [facility] locations.              Significant Imaging: I have reviewed all pertinent imaging results/findings within the past 24 hours.

## 2023-08-04 NOTE — ASSESSMENT & PLAN NOTE
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  -Continue Coumadin, Goal INR 2.0-3.0. subtherapeutic at 1.5 after coumadin held in setting of INR 3.4. Pharmacy to dose  -LDH is stable.  Will continue to monitor daily  -Speed set at 5100, LSL 4700 rpm  -Echo: 7/31/23 EF: 10-15%, LVEDD: 6.87 cm.  No vegation appreciated     Procedure: Device Interrogation Including analysis of device parameters  Current Settings: Ventricular Assist Device  Review of device function is stable    TXP LVAD INTERROGATIONS 8/4/2023 8/4/2023 8/3/2023 8/3/2023 8/3/2023 8/3/2023 8/3/2023   Type HeartMate3 HeartMate3 HeartMate3 HeartMate3 HeartMate3 HeartMate3 HeartMate3   Flow 4.4 4.3 4.4 4.4 4.5 4.4 4.4   Speed 5100 5100 5100 5100 5100 5100 5100   PI 3.3 4.3 3.4 3.0 4.0 3.1 4.2   Power (Serna) 3.7 2.7 3.6 3.7 3.6 3.6 3.6   LSL - 4700 4700 4700 4700 4700 4700   Low Flow Alarm - - - - - - -   High Power Alarm - - - - - - -   Pulsatility - Pulse Pulse Pulse Pulse Pulse Pulse

## 2023-08-04 NOTE — SUBJECTIVE & OBJECTIVE
Interval History: IR placed left chest tube yesterday. Chest tube output of 110 cc overnight. Pleural fluid culture with GPC. ID following, remains on IV Ancef 8mg QD. Tachycardic with HR in 120s over the past 2 days, possibly 2/2 infection.      Continuous Infusions:   milrinone 20mg/100ml D5W (200mcg/ml) 0.25 mcg/kg/min (08/03/23 2030)     Scheduled Meds:   aspirin  81 mg Oral Daily    busPIRone  10 mg Oral BID    ceFAZolin (ANCEF) 8 g in dextrose 5 % (D5W) 500 mL CONTINUOUS INFUSION  8 g Intravenous Q24H    furosemide  80 mg Oral Daily    insulin aspart U-100  20 Units Subcutaneous TIDWM    insulin detemir U-100  24 Units Subcutaneous BID    levETIRAcetam  1,000 mg Oral BID    mirtazapine  15 mg Oral Nightly    polyethylene glycol  17 g Oral Daily    senna-docusate 8.6-50 mg  2 tablet Oral BID    sodium chloride 0.9%  10 mL Intravenous Q6H    valsartan  40 mg Oral BID    warfarin  6 mg Oral Daily     PRN Meds:acetaminophen, dextrose 10%, dextrose 10%, glucagon (human recombinant), glucose, glucose, HYDROcodone-acetaminophen, insulin aspart U-100, Flushing PICC Protocol **AND** sodium chloride 0.9% **AND** sodium chloride 0.9%    Review of patient's allergies indicates:   Allergen Reactions    Bumex [bumetanide] Hives    Torsemide Hives     Objective:     Vital Signs (Most Recent):  Temp: 98.6 °F (37 °C) (08/04/23 0723)  Pulse: (!) 124 (08/04/23 0723)  Resp: 20 (08/04/23 1036)  BP: (!) 80/0 (08/04/23 0723)  SpO2: 96 % (08/04/23 1000) Vital Signs (24h Range):  Temp:  [97.9 °F (36.6 °C)-99.5 °F (37.5 °C)] 98.6 °F (37 °C)  Pulse:  [112-129] 124  Resp:  [16-22] 20  SpO2:  [95 %-100 %] 96 %  BP: ()/(0-66) 80/0     Patient Vitals for the past 72 hrs (Last 3 readings):   Weight   08/03/23 0750 90.5 kg (199 lb 8.3 oz)   08/02/23 0750 90.3 kg (199 lb 1.2 oz)       Body mass index is 24.94 kg/m².      Intake/Output Summary (Last 24 hours) at 8/4/2023 1043  Last data filed at 8/4/2023 0859  Gross per 24 hour   Intake  822 ml   Output 2160 ml   Net -1338 ml         Hemodynamic Parameters:       Telemetry: ST       Physical Exam  Constitutional:       Appearance: Normal appearance.   HENT:      Head: Normocephalic and atraumatic.   Eyes:      Conjunctiva/sclera: Conjunctivae normal.      Pupils: Pupils are equal, round, and reactive to light.   Neck:      Comments: Do not appreciate elevated neck veins  Cardiovascular:      Rate and Rhythm: Regular rhythm. Tachycardia present.      Comments: Smooth VAD hum  Pulmonary:      Effort: Pulmonary effort is normal.      Breath sounds: Normal breath sounds.   Abdominal:      General: Bowel sounds are normal.      Palpations: Abdomen is soft.   Musculoskeletal:         General: No swelling. Normal range of motion.      Cervical back: Normal range of motion and neck supple.   Skin:     General: Skin is warm and dry.      Capillary Refill: Capillary refill takes 2 to 3 seconds.   Neurological:      General: No focal deficit present.      Mental Status: He is alert and oriented to person, place, and time.   Psychiatric:         Mood and Affect: Mood normal.         Behavior: Behavior normal.         Thought Content: Thought content normal.         Judgment: Judgment normal.            Significant Labs:  CBC:  Recent Labs   Lab 08/02/23  0604 08/03/23 0441 08/04/23  0426   WBC 24.71* 20.28* 18.63*   RBC 4.09* 3.84* 3.63*   HGB 11.1* 10.4* 10.0*   HCT 33.5* 32.6* 31.0*    376 324   MCV 82 85 85   MCH 27.1 27.1 27.5   MCHC 33.1 31.9* 32.3       BNP:  Recent Labs   Lab 07/31/23  0501 08/02/23  0604 08/04/23  0426   * 82 104*       CMP:  Recent Labs   Lab 08/02/23  0604 08/03/23  0441 08/04/23  0426   * 169* 186*   CALCIUM 9.3 9.0 9.3   ALBUMIN 2.4* 2.3* 2.3*   PROT 7.4 7.2 7.4   * 133* 133*   K 4.3 4.1 4.1   CO2 22* 22* 24    101 100   BUN 32* 27* 24*   CREATININE 1.4 1.2 1.1   ALKPHOS 137* 159* 155*   ALT 22 29 18   AST 39 54* 31   BILITOT 0.7 0.7 0.8         Coagulation:   Recent Labs   Lab 08/02/23  0604 08/03/23  0441 08/04/23  0426   INR 1.8*  1.8* 1.5*  1.5* 1.5*  1.5*   APTT 30.6 30.3 33.2*       LDH:  Recent Labs   Lab 08/02/23  0604 08/03/23  0441 08/04/23  0426   * 389* 364*       Microbiology:  Microbiology Results (last 7 days)       Procedure Component Value Units Date/Time    Blood culture [[id number]] Collected: 07/30/23 0810    Order Status: Completed Specimen: Blood Updated: 08/04/23 1012     Blood Culture, Routine No growth after 5 days.    Blood culture [[id number]] Collected: 07/30/23 0810    Order Status: Completed Specimen: Blood Updated: 08/04/23 1012     Blood Culture, Routine No growth after 5 days.    Aerobic culture [[id number]] Collected: 08/03/23 1655    Order Status: Completed Specimen: Pleural Fluid Updated: 08/04/23 0858     Aerobic Bacterial Culture No growth    Gram stain [[id number]] Collected: 08/03/23 1655    Order Status: Completed Specimen: Pleural Fluid Updated: 08/03/23 2243     Gram Stain Result Rare WBC's      Rare Gram positive cocci    AFB Culture & Smear [[id number]] Collected: 08/03/23 1655    Order Status: Sent Specimen: Pleural Fluid Updated: 08/03/23 1715    Culture, Anaerobic [[id number]] Collected: 08/03/23 1655    Order Status: Sent Specimen: Pleural Fluid Updated: 08/03/23 1714    Blood culture [[id number]] Collected: 07/29/23 1130    Order Status: Completed Specimen: Blood Updated: 08/03/23 1412     Blood Culture, Routine No growth after 5 days.    Narrative:      Please obtain 2 sets after RIJ is pulled, thank you    Blood culture [[id number]] Collected: 07/29/23 1132    Order Status: Completed Specimen: Blood Updated: 08/03/23 1412     Blood Culture, Routine No growth after 5 days.    Narrative:      Please obtain 2 sets after RIJ is pulled, thank you  Rt hand    Blood culture [[id number]]  (Abnormal) Collected: 07/28/23 0940    Order Status: Completed Specimen: Blood from Peripheral, Left Hand Updated: 08/01/23  0834     Blood Culture, Routine Gram stain aer bottle: Gram positive cocci in clusters resembling Staph      Positive results previously called 07/30/2023  13:09      STAPHYLOCOCCUS AUREUS  ID consult required at [facility].  For susceptibility see order #[id number]      Blood culture [[id number]]  (Abnormal)  (Susceptibility) Collected: 07/28/23 0939    Order Status: Completed Specimen: Blood from Antecubital, Left Arm Updated: 07/31/23 1026     Blood Culture, Routine Gram stain aer bottle: Gram positive cocci in clusters resembling Staph      Positive results previously called 07/29/2023  21:20      STAPHYLOCOCCUS AUREUS  ID consult required at [facility] and [facility].      Blood culture [[id number]] Collected: 07/25/23 0450    Order Status: Completed Specimen: Blood Updated: 07/30/23 0612     Blood Culture, Routine No growth after 5 days.    Blood culture [[id number]]  (Abnormal)  (Susceptibility) Collected: 07/25/23 0451    Order Status: Completed Specimen: Blood Updated: 07/29/23 1045     Blood Culture, Routine Gram stain aer bottle: Gram positive cocci in clusters resembling Staph      Results called to and read back by:Laura Kilgore RN 07/27/2023  13:01      STAPHYLOCOCCUS AUREUS  ID consult required at [facility] the [facility] and [facility].              I have reviewed all pertinent labs within the past 24 hours.    Estimated Creatinine Clearance: 108.8 mL/min (based on SCr of 1.1 mg/dL).    Diagnostic Results:  I have reviewed and interpreted all pertinent imaging results/findings within the past 24 hours.

## 2023-08-04 NOTE — SUBJECTIVE & OBJECTIVE
Interval History: No fevers documented overnight. S/p IR chest tube placement 8/3 - noted to have thick bloody fluid; cx in process, g/s with GPC, studies exudative. Pt reports tolerating abx without issues.     Review of Systems  Objective:     Vital Signs (Most Recent):  Temp: 98.6 °F (37 °C) (08/04/23 0723)  Pulse: (!) 124 (08/04/23 0723)  Resp: 20 (08/04/23 1036)  BP: (!) 80/0 (08/04/23 0723)  SpO2: 96 % (08/04/23 1000) Vital Signs (24h Range):  Temp:  [97.9 °F (36.6 °C)-99.5 °F (37.5 °C)] 98.6 °F (37 °C)  Pulse:  [112-129] 124  Resp:  [16-22] 20  SpO2:  [95 %-100 %] 96 %  BP: ()/(0-66) 80/0     Weight: 90.5 kg (199 lb 8.3 oz)  Body mass index is 24.94 kg/m².    Estimated Creatinine Clearance: 108.8 mL/min (based on SCr of 1.1 mg/dL).     Physical Exam  Constitutional:       General: He is not in acute distress.     Appearance: He is not ill-appearing or toxic-appearing.   Pulmonary:      Effort: Pulmonary effort is normal. No respiratory distress.      Comments: L chest tube - dark bloody fluid  Abdominal:      Comments: LVAD dressed   Musculoskeletal:      Right lower leg: No edema.      Left lower leg: No edema.   Skin:     General: Skin is warm and dry.      Comments: R picc   Neurological:      Mental Status: He is alert and oriented to person, place, and time.          Significant Labs:   Microbiology Results (last 7 days)       Procedure Component Value Units Date/Time    Blood culture [[id number]] Collected: 07/30/23 0810    Order Status: Completed Specimen: Blood Updated: 08/04/23 1012     Blood Culture, Routine No growth after 5 days.    Blood culture [[id number]] Collected: 07/30/23 0810    Order Status: Completed Specimen: Blood Updated: 08/04/23 1012     Blood Culture, Routine No growth after 5 days.    Aerobic culture [[id number]] Collected: 08/03/23 1655    Order Status: Completed Specimen: Pleural Fluid Updated: 08/04/23 0858     Aerobic Bacterial Culture No growth    Gram stain [[id number]]  Collected: 08/03/23 1655    Order Status: Completed Specimen: Pleural Fluid Updated: 08/03/23 2243     Gram Stain Result Rare WBC's      Rare Gram positive cocci    AFB Culture & Smear [[id number]] Collected: 08/03/23 1655    Order Status: Sent Specimen: Pleural Fluid Updated: 08/03/23 1715    Culture, Anaerobic [[id number]] Collected: 08/03/23 1655    Order Status: Sent Specimen: Pleural Fluid Updated: 08/03/23 1714    Blood culture [[id number]] Collected: 07/29/23 1130    Order Status: Completed Specimen: Blood Updated: 08/03/23 1412     Blood Culture, Routine No growth after 5 days.    Narrative:      Please obtain 2 sets after RIJ is pulled, thank you    Blood culture [[id number]] Collected: 07/29/23 1132    Order Status: Completed Specimen: Blood Updated: 08/03/23 1412     Blood Culture, Routine No growth after 5 days.    Narrative:      Please obtain 2 sets after RIJ is pulled, thank you  Rt hand    Blood culture [[id number]]  (Abnormal) Collected: 07/28/23 0940    Order Status: Completed Specimen: Blood from Peripheral, Left Hand Updated: 08/01/23 0834     Blood Culture, Routine Gram stain aer bottle: Gram positive cocci in clusters resembling Staph      Positive results previously called 07/30/2023  13:09      STAPHYLOCOCCUS AUREUS  ID consult required at [facility].  For susceptibility see order #[id number]      Blood culture [[id number]]  (Abnormal)  (Susceptibility) Collected: 07/28/23 0939    Order Status: Completed Specimen: Blood from Antecubital, Left Arm Updated: 07/31/23 1026     Blood Culture, Routine Gram stain aer bottle: Gram positive cocci in clusters resembling Staph      Positive results previously called 07/29/2023  21:20      STAPHYLOCOCCUS AUREUS  ID consult required at [facility].      Blood culture [[id number]] Collected: 07/25/23 0450    Order Status: Completed Specimen: Blood Updated: 07/30/23 0612     Blood Culture, Routine No  growth after 5 days.    Blood culture [[id number]]  (Abnormal)  (Susceptibility) Collected: 07/25/23 0451    Order Status: Completed Specimen: Blood Updated: 07/29/23 1045     Blood Culture, Routine Gram stain aer bottle: Gram positive cocci in clusters resembling Staph      Results called to and read back by:Laura Kilgore RN 07/27/2023  13:01      STAPHYLOCOCCUS AUREUS  ID consult required at [facility]Critical access hospital,[place] and [facility] locations.              Significant Imaging: I have reviewed all pertinent imaging results/findings within the past 24 hours.

## 2023-08-04 NOTE — ASSESSMENT & PLAN NOTE
-NICM  -Last 2D Echo 10/31/22: LVEF 10%, LVEDD 7.25 cm  -Euvolemic on examination today, CVP 8 via PICC  -Current diuretic regimen: Lasix 80mg Qdaily  -Status post LVAD.  -GDMT: On Valsartan, Aldactone and Toprol at home. Valsartan 40 mg po bid resumed. K+ has trended up at 4.8 - D/C'd KCL 40 mEq bid. -Monitor K+ now that ARB resumed  -2g Na dietary restriction, 1500 mL fluid restriction, strict I/Os

## 2023-08-04 NOTE — PLAN OF CARE
Pulmonary Plan of Care - Intrapleural Lytics    Dose 1 of 3 of daily intrapleural lytics instilled at 18:15pm.    60cc of TPA, Dornase and 10cc of normal saline flush administered via chest tube. Tube clamped at 3 way stopcock, to remain clamped for 1 hour.    Nursing notified at bedside to unclamp stopcock at 19:15pm.    Sundeep Thompson M.D.  [facility] Pulmonary & Critical Care Fellow

## 2023-08-04 NOTE — PROGRESS NOTES
[facility] - Cardiology Stepdown  Pulmonology  Progress Note    Patient Name: Kevan Queen  MRN: [id number]  Admission Date: 7/22/2023  Hospital Length of Stay: 13 days  Code Status: Full Code  Attending Provider: Marlo Marques MD  Primary Care Provider: Rosio Armendariz FNP   Principal Problem: Sepsis    Subjective:     Interval History: NAEON. About 110 cc serosanguineous output from chest tube this morning. Patient reports mild pain at intrapleural catheter insertion site but denies all other symptoms.    Objective:     Vital Signs (Most Recent):  Temp: 98.4 °F (36.9 °C) (08/04/23 1122)  Pulse: (!) 124 (08/04/23 1200)  Resp: 20 (08/04/23 1122)  BP: (!) 64/0 (08/04/23 1122)  SpO2: 95 % (08/04/23 1122) Vital Signs (24h Range):  Temp:  [97.9 °F (36.6 °C)-99.5 °F (37.5 °C)] 98.4 °F (36.9 °C)  Pulse:  [112-129] 124  Resp:  [16-22] 20  SpO2:  [95 %-100 %] 95 %  BP: ()/(0-66) 64/0     Weight: 90.5 kg (199 lb 8.3 oz)  Body mass index is 24.94 kg/m².      Intake/Output Summary (Last 24 hours) at 8/4/2023 1315  Last data filed at 8/4/2023 1203  Gross per 24 hour   Intake 1435 ml   Output 2660 ml   Net -1225 ml        Physical Exam  Vitals and nursing note reviewed.   Constitutional:       General: He is not in acute distress.     Appearance: Normal appearance.   HENT:      Head: Normocephalic and atraumatic.      Right Ear: External ear normal.      Left Ear: External ear normal.   Eyes:      General:         Right eye: No discharge.         Left eye: No discharge.      Conjunctiva/sclera: Conjunctivae normal.   Cardiovascular:      Heart sounds: Normal heart sounds.      No friction rub (difficult to assess with LVAD hum).      Comments: LVAD hum noted on exam  L chest tube  Pulmonary:      Effort: Pulmonary effort is normal. No respiratory distress.      Breath sounds: Normal breath sounds. No wheezing or rales.   Abdominal:      General: Abdomen is flat. There is no distension.   Musculoskeletal:      Cervical  back: Normal range of motion.      Right lower leg: No edema.      Left lower leg: No edema.   Skin:     General: Skin is warm and dry.   Neurological:      Mental Status: He is alert and oriented to person, place, and time. Mental status is at baseline.   Psychiatric:         Mood and Affect: Mood normal.         Behavior: Behavior normal.           Review of Systems   Constitutional:  Negative for chills and fever.   HENT:  Negative for congestion and sore throat.    Eyes:  Negative for photophobia and visual disturbance.   Respiratory:  Negative for cough, chest tightness and shortness of breath.    Cardiovascular:  Negative for chest pain and palpitations.   Gastrointestinal:  Negative for abdominal pain, diarrhea, nausea and vomiting.   Endocrine: Negative for polydipsia and polyuria.   All other systems reviewed and are negative.      Vents:       Lines/Drains/Airways       Peripherally Inserted Central Catheter Line  Duration             PICC Double Lumen 08/01/23 1121 right basilic 3 days              Drain  Duration                  Chest Tube 08/03/23 1655 Left Pleural 10 Fr. <1 day              Line  Duration                  VAD 06/29/22 1115 Left ventricular assist device HeartMate 3 401 days                    Significant Labs:    CBC/Anemia Profile:  Recent Labs   Lab 08/03/23  0441 08/04/23  0426   WBC 20.28* 18.63*   HGB 10.4* 10.0*   HCT 32.6* 31.0*    324   MCV 85 85   RDW 23.6* 23.1*        Chemistries:  Recent Labs   Lab 08/03/23  0441 08/04/23  0426   * 133*   K 4.1 4.1    100   CO2 22* 24   BUN 27* 24*   CREATININE 1.2 1.1   CALCIUM 9.0 9.3   ALBUMIN 2.3* 2.3*   PROT 7.2 7.4   BILITOT 0.7 0.8   ALKPHOS 159* 155*   ALT 29 18   AST 54* 31   MG 1.9 2.0   PHOS 3.0 2.8       All pertinent labs within the past 24 hours have been reviewed.    Significant Imaging:  I have reviewed all pertinent imaging results/findings within the past 24 hours.    Assessment/Plan:      Pulmonary  Pleural effusion  38 year-old male with LVAD, recent COVID PNA s/p treatment, staph bacteremia that presented with elements of pleuritis, fever found to have CT imaging this admission consistent with small left sided pleural effusion and complicated pleural around left heart border. Given history of LVAD and known bacteremia recommend having the fluid drained. Bedside ultrasound showed small pleural effusion and evidence of stranding. Suspect this is a para-pneumonic process vs empyema in setting of MSSA bacteremia. CT from June reviewed and no posterior fluid collection at that time. Patient on daily coumadin. CT surgery recommended image guided placement of pigtail catheter into collection. Left chest tube placed by IR on 8/3.    Plan / Recs:  - Pleural gram stain with rare gram positive cocci indicative of empyema  - Antibiotics per ID  - Intrapleural lytics daily x 2-3 days, initiated 8/4  - Monitor chest tube output  - CT chest after lytics completed  - Pulmonology to follow        Thank you for your consult. Pulmonology with continue to follow-up with the patient.      Fausto Belle MD  Pulmonology  [facility] - Cardiology Stepdown

## 2023-08-04 NOTE — ASSESSMENT & PLAN NOTE
38 year-old male with LVAD, recent COVID PNA s/p treatment, staph bacteremia that presented with elements of pleuritis, fever found to have CT imaging this admission consistent with small left sided pleural effusion and complicated pleural around left heart border. Given history of LVAD and known bacteremia recommend having the fluid drained. Bedside ultrasound showed small pleural effusion and evidence of stranding. Suspect this is a para-pneumonic process vs empyema in setting of MSSA bacteremia. CT from June reviewed and no posterior fluid collection at that time. Patient on daily coumadin. CT surgery recommended image guided placement of pigtail catheter into collection. Left chest tube placed by IR on 8/3.    Plan / Recs:  - pleural gram stain with rare gram positive cocci indicative of empyema  - Antibiotics per ID  - Intrapleural lytics daily x 2-3 days, initiated 8/4  - Monitor chest tube output  - CT chest after lytics completed  - Pulmonology to follow

## 2023-08-04 NOTE — SUBJECTIVE & OBJECTIVE
Interval History: NAEON. About 110 cc serosanguineous output from chest tube this morning. Patient reports mild pain at intrapleural catheter insertion site but denies all other symptoms.    Objective:     Vital Signs (Most Recent):  Temp: 98.4 °F (36.9 °C) (08/04/23 1122)  Pulse: (!) 124 (08/04/23 1200)  Resp: 20 (08/04/23 1122)  BP: (!) 64/0 (08/04/23 1122)  SpO2: 95 % (08/04/23 1122) Vital Signs (24h Range):  Temp:  [97.9 °F (36.6 °C)-99.5 °F (37.5 °C)] 98.4 °F (36.9 °C)  Pulse:  [112-129] 124  Resp:  [16-22] 20  SpO2:  [95 %-100 %] 95 %  BP: ()/(0-66) 64/0     Weight: 90.5 kg (199 lb 8.3 oz)  Body mass index is 24.94 kg/m².      Intake/Output Summary (Last 24 hours) at 8/4/2023 1315  Last data filed at 8/4/2023 1203  Gross per 24 hour   Intake 1435 ml   Output 2660 ml   Net -1225 ml        Physical Exam  Vitals and nursing note reviewed.   Constitutional:       General: He is not in acute distress.     Appearance: Normal appearance.   HENT:      Head: Normocephalic and atraumatic.      Right Ear: External ear normal.      Left Ear: External ear normal.   Eyes:      General:         Right eye: No discharge.         Left eye: No discharge.      Conjunctiva/sclera: Conjunctivae normal.   Cardiovascular:      Heart sounds: Normal heart sounds.      No friction rub (difficult to assess with LVAD hum).      Comments: LVAD hum noted on exam  L chest tube  Pulmonary:      Effort: Pulmonary effort is normal. No respiratory distress.      Breath sounds: Normal breath sounds. No wheezing or rales.   Abdominal:      General: Abdomen is flat. There is no distension.   Musculoskeletal:      Cervical back: Normal range of motion.      Right lower leg: No edema.      Left lower leg: No edema.   Skin:     General: Skin is warm and dry.   Neurological:      Mental Status: He is alert and oriented to person, place, and time. Mental status is at baseline.   Psychiatric:         Mood and Affect: Mood normal.         Behavior:  Behavior normal.           Review of Systems   Constitutional:  Negative for chills and fever.   HENT:  Negative for congestion and sore throat.    Eyes:  Negative for photophobia and visual disturbance.   Respiratory:  Negative for cough, chest tightness and shortness of breath.    Cardiovascular:  Negative for chest pain and palpitations.   Gastrointestinal:  Negative for abdominal pain, diarrhea, nausea and vomiting.   Endocrine: Negative for polydipsia and polyuria.   All other systems reviewed and are negative.      Vents:       Lines/Drains/Airways       Peripherally Inserted Central Catheter Line  Duration             PICC Double Lumen 08/01/23 1121 right basilic 3 days              Drain  Duration                  Chest Tube 08/03/23 1655 Left Pleural 10 Fr. <1 day              Line  Duration                  VAD 06/29/22 1115 Left ventricular assist device HeartMate 3 401 days                    Significant Labs:    CBC/Anemia Profile:  Recent Labs   Lab 08/03/23  0441 08/04/23  0426   WBC 20.28* 18.63*   HGB 10.4* 10.0*   HCT 32.6* 31.0*    324   MCV 85 85   RDW 23.6* 23.1*        Chemistries:  Recent Labs   Lab 08/03/23  0441 08/04/23  0426   * 133*   K 4.1 4.1    100   CO2 22* 24   BUN 27* 24*   CREATININE 1.2 1.1   CALCIUM 9.0 9.3   ALBUMIN 2.3* 2.3*   PROT 7.2 7.4   BILITOT 0.7 0.8   ALKPHOS 159* 155*   ALT 29 18   AST 54* 31   MG 1.9 2.0   PHOS 3.0 2.8       All pertinent labs within the past 24 hours have been reviewed.    Significant Imaging:  I have reviewed all pertinent imaging results/findings within the past 24 hours.

## 2023-08-04 NOTE — PLAN OF CARE
Problem: Adult Inpatient Plan of Care  Goal: Patient-Specific Goal (Individualized)  Outcome: Ongoing, Progressing  Flowsheets (Taken 8/4/2023 9125)  Anxieties, Fears or Concerns: None voiced  Individualized Care Needs:   Maintain Milrinone gtt as per MAR. Maintain ABX protocol. PErform LVAD dressing daily with kit due tomorrow. LVAD # WNL. Covid-19 precautions maintained. CVP to be done daily with AM labs, CVP today 8.     Plan of care discussed with patient.  Patient ambulating independently, fall precautions in place. LVAD DP and numbers WNL, smooth LVAD hum. Patient's pain managed.  Discussed medications and care.  Patient has no questions at this time. Will continue to monitor throughout my shift.

## 2023-08-04 NOTE — NURSING TRANSFER
Nursing Transfer Note      8/3/2023   7:33 PM    Nurse giving handoff:Cecilio RN  Nurse receiving handoff:Xiagn RN    Reason patient is being transferred: procedure done    Transfer To: room 314    Transfer via stretcher    Transfer with cardiac monitoring    Transported by RN    Telemetry: Box Number [id number]  Order for Tele Monitor? Yes    4eyes on Skin: yes    Any special needs or follow-up needed: monitor procedure  site    Patient belongings transferred with patient: Yes    Chart send with patient: Yes    Patient reassessed at: 1720 08/03/2023     Upon arrival to floor: cardiac monitor applied, patient oriented to room, call bell in reach, and bed in lowest position  Procedure site at back dressing CDI.

## 2023-08-04 NOTE — CARE UPDATE
Care Update:     No acute events overnight. Patient on the CSU in room 314/314 A. Blood glucose stable. BG mostly at goal on current insulin regimen (SSI, prandial, and basal insulin ). Steroid use- None.    Renal function- Normal   Vasopressors-  None       Diet diabetic [facility]; 2800 Calorie     POCT Glucose   Date Value Ref Range Status   08/04/2023 96 70 - 110 mg/dL Final   08/04/2023 231 (H) 70 - 110 mg/dL Final   08/04/2023 255 (H) 70 - 110 mg/dL Final   08/03/2023 162 (H) 70 - 110 mg/dL Final   08/03/2023 117 (H) 70 - 110 mg/dL Final   08/03/2023 183 (H) 70 - 110 mg/dL Final   08/03/2023 170 (H) 70 - 110 mg/dL Final   08/03/2023 167 (H) 70 - 110 mg/dL Final   08/03/2023 233 (H) 70 - 110 mg/dL Final   08/02/2023 194 (H) 70 - 110 mg/dL Final   08/02/2023 159 (H) 70 - 110 mg/dL Final   08/02/2023 198 (H) 70 - 110 mg/dL Final   08/02/2023 180 (H) 70 - 110 mg/dL Final   08/01/2023 176 (H) 70 - 110 mg/dL Final   08/01/2023 230 (H) 70 - 110 mg/dL Final     Lab Results   Component Value Date    HGBA1C 6.7 (H) 07/22/2023       Diabetes Medications               insulin aspart U-100 (NOVOLOG) 100 unit/mL (3 mL) InPn pen Inject 16 Units into the skin 3 (three) times daily with meals. Plus Sliding Scale: 151-200: +1, 201-250: +2, 251-300: +3, 301-350: +4 and call MD; Max Daily Dose: 60 units    insulin detemir U-100, Levemir, 100 unit/mL (3 mL) SubQ InPn pen Inject 22 Units into the skin 2 (two) times daily.          Endocrine  Type 2 diabetes mellitus with hyperglycemia  Endocrinology consulted for BG management.   BG goal 140-180     - Levemir (Insulin Detemir) 24 units BID   - Novolog (Insulin Aspart) 20 units TIDWM and prn for BG excursions [place] SSI (150/25)   - BG checks AC/HS/0200  - Hypoglycemia protocol in place  - If blood glucose greater than 300, please ask patient not to eat food or drink anything other than water until correctional insulin has brought it back below 250    ** Please notify Endocrine  for any change and/or advance in diet**  ** Please call Endocrine for any BG related issues **    Discharge Planning:   TBD. Please notify endocrinology prior to discharge.

## 2023-08-04 NOTE — ASSESSMENT & PLAN NOTE
I independently reviewed patient's lab work and images as documented. 37 yo male with LVAD c/b prior MSSA DLESI s/p treatment admitted with MSSA bacteremia 2/2 to DLES. Admit blcx and repeat DLES cx with MSSA; prior PICC removed. Also found to have COVID, CXR with L >R opacity; s/p remdesivir and dex. Denies new joint/back pain or diarrhea. CVC (placed during hospital stay) removed on 7/29, repeat blcx post-line removal ngtd. TTE unrevealing for vegetations. Recent CXR with stable L>R opacity (stable) with pleural effusion.  CT with L loculated effusion (lateral heart border and base); prior stranding noted around DL site resolved. S/p IR aspiration 8/3 - studies c/w exudate, g/s with GPC; cx in process; suspect same organism (MSSA) as isolated from blood and DLES. Pt reports interval improvement in L pleuritic chest pain since chest tube placement. Denies new joint or back pain.     Recommendations:  -continue ancef 8g CI IV daily, anticipate 6w course from chest tube placement, maria isabel 9/14  -f/u cx data        Outpatient Antibiotic Therapy Plan:    Please send referral to [facility].    1) Infection: MSSA bacteremia/LVAD DLESI/empyema    2) Discharge Antibiotics:    Intravenous antibiotics:   Ancef 8g continuous infusion daily      3) Therapy Duration:  6w from chest tube placement    Estimated end date of IV antibiotics: 9/14/23    4) Outpatient Weekly Labs:    Order the following labs to be drawn on Mondays:    CBC   CMP       5) Fax Lab Results to Infectious Diseases Provider: Nicolasa    [facility] Fax Number: [phone]    6) Outpatient Infectious Diseases Follow-up     Follow-up appointment will be arranged by the ID clinic and will be found in the patient's appointments tab.     Prior to discharge, please ensure the patient's follow-up has been scheduled.     If there is still no follow-up scheduled prior to discharge, please send an EPIC message to Lynn  Silver in Infectious Diseases.

## 2023-08-04 NOTE — PROGRESS NOTES
08/04/2023  Mirela Perez    Current provider:  Marlo Marques MD    Device interrogation:  TXP LVAD INTERROGATIONS 8/4/2023 8/4/2023 8/3/2023 8/3/2023 8/3/2023 8/3/2023 8/3/2023   Type HeartMate3 HeartMate3 HeartMate3 HeartMate3 HeartMate3 HeartMate3 HeartMate3   Flow 4.4 4.3 4.4 4.4 4.5 4.4 4.4   Speed 5100 5100 5100 5100 5100 5100 5100   PI 3.3 4.3 3.4 3.0 4.0 3.1 4.2   Power (Serna) 3.7 2.7 3.6 3.7 3.6 3.6 3.6   LSL - 4700 4700 4700 4700 4700 4700   Low Flow Alarm - - - - - - -   High Power Alarm - - - - - - -   Pulsatility - Pulse Pulse Pulse Pulse Pulse Pulse          Rounded on Kevan Queen to ensure all mechanical assist device settings (IABP or VAD) were appropriate and all parameters were within limits.  I was able to ensure all back up equipment was present, the staff had no issues, and the Perfusion Department daily rounding was complete.      For implantable VADs: Interrogation of Ventricular assist device was performed with analysis of device parameters and review of device function. I have personally reviewed the interrogation findings and agree with findings as stated.     In emergency, the nursing units have been notified to contact the perfusion department either by:  Calling [phone] from 630am to 4pm Mon thru Fri, utilizing the On-Call Finder functionality of Epic and searching for Perfusion, or by contacting the hospital  from 4pm to 630am and on weekends and asking to speak with the perfusionist on call.    7:59 AM

## 2023-08-05 LAB
ALBUMIN SERPL BCP-MCNC: 2.2 G/DL (ref 3.5–5.2)
ALP SERPL-CCNC: 157 U/L (ref 55–135)
ALT SERPL W/O P-5'-P-CCNC: 15 U/L (ref 10–44)
ANION GAP SERPL CALC-SCNC: 10 MMOL/L (ref 8–16)
APTT PPP: 32.6 SEC (ref 21–32)
AST SERPL-CCNC: 27 U/L (ref 10–40)
BASOPHILS # BLD AUTO: 0.03 K/UL (ref 0–0.2)
BASOPHILS # BLD AUTO: 0.04 K/UL (ref 0–0.2)
BASOPHILS NFR BLD: 0.2 % (ref 0–1.9)
BASOPHILS NFR BLD: 0.2 % (ref 0–1.9)
BILIRUB SERPL-MCNC: 1.1 MG/DL (ref 0.1–1)
BUN SERPL-MCNC: 22 MG/DL (ref 6–20)
CALCIUM SERPL-MCNC: 9 MG/DL (ref 8.7–10.5)
CHLORIDE SERPL-SCNC: 99 MMOL/L (ref 95–110)
CO2 SERPL-SCNC: 24 MMOL/L (ref 23–29)
CREAT SERPL-MCNC: 1 MG/DL (ref 0.5–1.4)
DIFFERENTIAL METHOD: ABNORMAL
DIFFERENTIAL METHOD: ABNORMAL
EOSINOPHIL # BLD AUTO: 0 K/UL (ref 0–0.5)
EOSINOPHIL # BLD AUTO: 0.1 K/UL (ref 0–0.5)
EOSINOPHIL NFR BLD: 0.2 % (ref 0–8)
EOSINOPHIL NFR BLD: 0.3 % (ref 0–8)
ERYTHROCYTE [DISTWIDTH] IN BLOOD BY AUTOMATED COUNT: 21.5 % (ref 11.5–14.5)
ERYTHROCYTE [DISTWIDTH] IN BLOOD BY AUTOMATED COUNT: 21.9 % (ref 11.5–14.5)
EST. GFR  (NO RACE VARIABLE): >60 ML/MIN/1.73 M^2
GLUCOSE SERPL-MCNC: 213 MG/DL (ref 70–110)
HCT VFR BLD AUTO: 29.3 % (ref 40–54)
HCT VFR BLD AUTO: 30.8 % (ref 40–54)
HGB BLD-MCNC: 10.1 G/DL (ref 14–18)
HGB BLD-MCNC: 9.6 G/DL (ref 14–18)
IMM GRANULOCYTES # BLD AUTO: 0.1 K/UL (ref 0–0.04)
IMM GRANULOCYTES # BLD AUTO: 0.15 K/UL (ref 0–0.04)
IMM GRANULOCYTES NFR BLD AUTO: 0.5 % (ref 0–0.5)
IMM GRANULOCYTES NFR BLD AUTO: 0.8 % (ref 0–0.5)
INR PPP: 1.7 (ref 0.8–1.2)
INR PPP: 1.7 (ref 0.8–1.2)
LDH SERPL L TO P-CCNC: 320 U/L (ref 110–260)
LYMPHOCYTES # BLD AUTO: 2.2 K/UL (ref 1–4.8)
LYMPHOCYTES # BLD AUTO: 2.4 K/UL (ref 1–4.8)
LYMPHOCYTES NFR BLD: 11.6 % (ref 18–48)
LYMPHOCYTES NFR BLD: 12.7 % (ref 18–48)
MAGNESIUM SERPL-MCNC: 1.7 MG/DL (ref 1.6–2.6)
MCH RBC QN AUTO: 27.2 PG (ref 27–31)
MCH RBC QN AUTO: 27.6 PG (ref 27–31)
MCHC RBC AUTO-ENTMCNC: 32.8 G/DL (ref 32–36)
MCHC RBC AUTO-ENTMCNC: 32.8 G/DL (ref 32–36)
MCV RBC AUTO: 83 FL (ref 82–98)
MCV RBC AUTO: 84 FL (ref 82–98)
MONOCYTES # BLD AUTO: 1.2 K/UL (ref 0.3–1)
MONOCYTES # BLD AUTO: 1.3 K/UL (ref 0.3–1)
MONOCYTES NFR BLD: 6.1 % (ref 4–15)
MONOCYTES NFR BLD: 6.7 % (ref 4–15)
NEUTROPHILS # BLD AUTO: 15 K/UL (ref 1.8–7.7)
NEUTROPHILS # BLD AUTO: 15.5 K/UL (ref 1.8–7.7)
NEUTROPHILS NFR BLD: 79.9 % (ref 38–73)
NEUTROPHILS NFR BLD: 80.8 % (ref 38–73)
NRBC BLD-RTO: 0 /100 WBC
NRBC BLD-RTO: 0 /100 WBC
PHOSPHATE SERPL-MCNC: 3 MG/DL (ref 2.7–4.5)
PLATELET # BLD AUTO: 279 K/UL (ref 150–450)
PLATELET # BLD AUTO: 299 K/UL (ref 150–450)
PMV BLD AUTO: 9.3 FL (ref 9.2–12.9)
PMV BLD AUTO: 9.4 FL (ref 9.2–12.9)
POCT GLUCOSE: 104 MG/DL (ref 70–110)
POCT GLUCOSE: 215 MG/DL (ref 70–110)
POCT GLUCOSE: 260 MG/DL (ref 70–110)
POCT GLUCOSE: 85 MG/DL (ref 70–110)
POTASSIUM SERPL-SCNC: 4.1 MMOL/L (ref 3.5–5.1)
PROT SERPL-MCNC: 7.4 G/DL (ref 6–8.4)
PROTHROMBIN TIME: 17.9 SEC (ref 9–12.5)
PROTHROMBIN TIME: 17.9 SEC (ref 9–12.5)
RBC # BLD AUTO: 3.53 M/UL (ref 4.6–6.2)
RBC # BLD AUTO: 3.66 M/UL (ref 4.6–6.2)
SODIUM SERPL-SCNC: 133 MMOL/L (ref 136–145)
WBC # BLD AUTO: 18.83 K/UL (ref 3.9–12.7)
WBC # BLD AUTO: 19.12 K/UL (ref 3.9–12.7)

## 2023-08-05 PROCEDURE — 85025 COMPLETE CBC W/AUTO DIFF WBC: CPT | Mod: 91 | Performed by: NURSE PRACTITIONER

## 2023-08-05 PROCEDURE — 83735 ASSAY OF MAGNESIUM: CPT | Performed by: HOSPITALIST

## 2023-08-05 PROCEDURE — 25000003 PHARM REV CODE 250: Performed by: STUDENT IN AN ORGANIZED HEALTH CARE EDUCATION/TRAINING PROGRAM

## 2023-08-05 PROCEDURE — 25000003 PHARM REV CODE 250: Performed by: HOSPITALIST

## 2023-08-05 PROCEDURE — 87040 BLOOD CULTURE FOR BACTERIA: CPT | Mod: 59 | Performed by: PHYSICIAN ASSISTANT

## 2023-08-05 PROCEDURE — 20600001 HC STEP DOWN PRIVATE ROOM

## 2023-08-05 PROCEDURE — 99233 SBSQ HOSP IP/OBS HIGH 50: CPT | Mod: 95,,, | Performed by: PHYSICIAN ASSISTANT

## 2023-08-05 PROCEDURE — 63600175 PHARM REV CODE 636 W HCPCS: Performed by: INTERNAL MEDICINE

## 2023-08-05 PROCEDURE — 84100 ASSAY OF PHOSPHORUS: CPT | Performed by: HOSPITALIST

## 2023-08-05 PROCEDURE — A4216 STERILE WATER/SALINE, 10 ML: HCPCS | Performed by: INTERNAL MEDICINE

## 2023-08-05 PROCEDURE — 25000003 PHARM REV CODE 250: Performed by: NURSE PRACTITIONER

## 2023-08-05 PROCEDURE — 93750 INTERROGATION VAD IN PERSON: CPT | Mod: ,,, | Performed by: INTERNAL MEDICINE

## 2023-08-05 PROCEDURE — 99233 PR SUBSEQUENT HOSPITAL CARE,LEVL III: ICD-10-PCS | Mod: 95,,, | Performed by: PHYSICIAN ASSISTANT

## 2023-08-05 PROCEDURE — 85730 THROMBOPLASTIN TIME PARTIAL: CPT | Performed by: STUDENT IN AN ORGANIZED HEALTH CARE EDUCATION/TRAINING PROGRAM

## 2023-08-05 PROCEDURE — 63600175 PHARM REV CODE 636 W HCPCS: Performed by: PHYSICIAN ASSISTANT

## 2023-08-05 PROCEDURE — 25000242 PHARM REV CODE 250 ALT 637 W/ HCPCS: Performed by: STUDENT IN AN ORGANIZED HEALTH CARE EDUCATION/TRAINING PROGRAM

## 2023-08-05 PROCEDURE — 27000248 HC VAD-ADDITIONAL DAY

## 2023-08-05 PROCEDURE — 36415 COLL VENOUS BLD VENIPUNCTURE: CPT | Performed by: PHYSICIAN ASSISTANT

## 2023-08-05 PROCEDURE — 85610 PROTHROMBIN TIME: CPT | Performed by: HOSPITALIST

## 2023-08-05 PROCEDURE — 83615 LACTATE (LD) (LDH) ENZYME: CPT | Performed by: HOSPITALIST

## 2023-08-05 PROCEDURE — 63600175 PHARM REV CODE 636 W HCPCS: Performed by: STUDENT IN AN ORGANIZED HEALTH CARE EDUCATION/TRAINING PROGRAM

## 2023-08-05 PROCEDURE — 25000003 PHARM REV CODE 250: Performed by: INTERNAL MEDICINE

## 2023-08-05 PROCEDURE — 63600175 PHARM REV CODE 636 W HCPCS: Performed by: HOSPITALIST

## 2023-08-05 PROCEDURE — 80053 COMPREHEN METABOLIC PANEL: CPT | Performed by: NURSE PRACTITIONER

## 2023-08-05 PROCEDURE — 27000207 HC ISOLATION

## 2023-08-05 PROCEDURE — A4217 STERILE WATER/SALINE, 500 ML: HCPCS | Performed by: STUDENT IN AN ORGANIZED HEALTH CARE EDUCATION/TRAINING PROGRAM

## 2023-08-05 PROCEDURE — 85025 COMPLETE CBC W/AUTO DIFF WBC: CPT | Performed by: PHYSICIAN ASSISTANT

## 2023-08-05 PROCEDURE — 93750 PR INTERROGATE VENT ASSIST DEV, IN PERSON, W PHYSICIAN ANALYSIS: ICD-10-PCS | Mod: ,,, | Performed by: INTERNAL MEDICINE

## 2023-08-05 RX ORDER — LIDOCAINE HYDROCHLORIDE 10 MG/ML
10 INJECTION INFILTRATION; PERINEURAL ONCE
Status: COMPLETED | OUTPATIENT
Start: 2023-08-05 | End: 2023-08-05

## 2023-08-05 RX ORDER — HYDROCODONE BITARTRATE AND ACETAMINOPHEN 5; 325 MG/1; MG/1
1 TABLET ORAL EVERY 6 HOURS PRN
Status: DISCONTINUED | OUTPATIENT
Start: 2023-08-05 | End: 2023-08-09 | Stop reason: HOSPADM

## 2023-08-05 RX ORDER — HYDROMORPHONE HYDROCHLORIDE 1 MG/ML
0.2 INJECTION, SOLUTION INTRAMUSCULAR; INTRAVENOUS; SUBCUTANEOUS ONCE AS NEEDED
Status: COMPLETED | OUTPATIENT
Start: 2023-08-05 | End: 2023-08-05

## 2023-08-05 RX ORDER — MAGNESIUM SULFATE HEPTAHYDRATE 40 MG/ML
2 INJECTION, SOLUTION INTRAVENOUS ONCE
Status: COMPLETED | OUTPATIENT
Start: 2023-08-05 | End: 2023-08-05

## 2023-08-05 RX ORDER — MORPHINE SULFATE 2 MG/ML
2 INJECTION, SOLUTION INTRAMUSCULAR; INTRAVENOUS ONCE
Status: COMPLETED | OUTPATIENT
Start: 2023-08-05 | End: 2023-08-05

## 2023-08-05 RX ORDER — HYDROMORPHONE HYDROCHLORIDE 1 MG/ML
0.2 INJECTION, SOLUTION INTRAMUSCULAR; INTRAVENOUS; SUBCUTANEOUS EVERY 8 HOURS PRN
Status: DISPENSED | OUTPATIENT
Start: 2023-08-05 | End: 2023-08-07

## 2023-08-05 RX ORDER — ACETAMINOPHEN 325 MG/1
650 TABLET ORAL EVERY 6 HOURS PRN
Status: DISCONTINUED | OUTPATIENT
Start: 2023-08-05 | End: 2023-08-09 | Stop reason: HOSPADM

## 2023-08-05 RX ORDER — OXYCODONE AND ACETAMINOPHEN 5; 325 MG/1; MG/1
1 TABLET ORAL ONCE
Status: COMPLETED | OUTPATIENT
Start: 2023-08-05 | End: 2023-08-05

## 2023-08-05 RX ADMIN — INSULIN DETEMIR 24 UNITS: 100 INJECTION, SOLUTION SUBCUTANEOUS at 09:08

## 2023-08-05 RX ADMIN — MAGNESIUM SULFATE 2 G: 2 INJECTION INTRAVENOUS at 04:08

## 2023-08-05 RX ADMIN — INSULIN ASPART 9 UNITS: 100 INJECTION, SOLUTION INTRAVENOUS; SUBCUTANEOUS at 05:08

## 2023-08-05 RX ADMIN — INSULIN ASPART 20 UNITS: 100 INJECTION, SOLUTION INTRAVENOUS; SUBCUTANEOUS at 05:08

## 2023-08-05 RX ADMIN — Medication 10 ML: at 12:08

## 2023-08-05 RX ADMIN — VALSARTAN 40 MG: 40 TABLET, FILM COATED ORAL at 09:08

## 2023-08-05 RX ADMIN — ASPIRIN 81 MG: 81 TABLET, COATED ORAL at 08:08

## 2023-08-05 RX ADMIN — Medication 10 ML: at 06:08

## 2023-08-05 RX ADMIN — CEFAZOLIN 8 G: 2 INJECTION, POWDER, FOR SOLUTION INTRAMUSCULAR; INTRAVENOUS at 10:08

## 2023-08-05 RX ADMIN — HYDROMORPHONE HYDROCHLORIDE 0.2 MG: 1 INJECTION, SOLUTION INTRAMUSCULAR; INTRAVENOUS; SUBCUTANEOUS at 11:08

## 2023-08-05 RX ADMIN — HYDROMORPHONE HYDROCHLORIDE 0.2 MG: 1 INJECTION, SOLUTION INTRAMUSCULAR; INTRAVENOUS; SUBCUTANEOUS at 01:08

## 2023-08-05 RX ADMIN — WARFARIN SODIUM 6 MG: 3 TABLET ORAL at 05:08

## 2023-08-05 RX ADMIN — ALTEPLASE 10 MG: 2.2 INJECTION, POWDER, LYOPHILIZED, FOR SOLUTION INTRAVENOUS at 02:08

## 2023-08-05 RX ADMIN — LEVETIRACETAM 1000 MG: 500 TABLET, FILM COATED ORAL at 08:08

## 2023-08-05 RX ADMIN — HYDROMORPHONE HYDROCHLORIDE 0.2 MG: 1 INJECTION, SOLUTION INTRAMUSCULAR; INTRAVENOUS; SUBCUTANEOUS at 02:08

## 2023-08-05 RX ADMIN — FUROSEMIDE 80 MG: 80 TABLET ORAL at 08:08

## 2023-08-05 RX ADMIN — OXYCODONE HYDROCHLORIDE AND ACETAMINOPHEN 1 TABLET: 5; 325 TABLET ORAL at 05:08

## 2023-08-05 RX ADMIN — VALSARTAN 40 MG: 40 TABLET, FILM COATED ORAL at 08:08

## 2023-08-05 RX ADMIN — INSULIN ASPART 6 UNITS: 100 INJECTION, SOLUTION INTRAVENOUS; SUBCUTANEOUS at 08:08

## 2023-08-05 RX ADMIN — INSULIN ASPART 20 UNITS: 100 INJECTION, SOLUTION INTRAVENOUS; SUBCUTANEOUS at 08:08

## 2023-08-05 RX ADMIN — MILRINONE LACTATE 0.25 MCG/KG/MIN: 0.2 INJECTION, SOLUTION INTRAVENOUS at 10:08

## 2023-08-05 RX ADMIN — DORNASE ALFA 5 MG: 1 SOLUTION RESPIRATORY (INHALATION) at 02:08

## 2023-08-05 RX ADMIN — MIRTAZAPINE 15 MG: 15 TABLET, FILM COATED ORAL at 09:08

## 2023-08-05 RX ADMIN — MORPHINE SULFATE 2 MG: 2 INJECTION, SOLUTION INTRAMUSCULAR; INTRAVENOUS at 08:08

## 2023-08-05 RX ADMIN — LEVETIRACETAM 1000 MG: 500 TABLET, FILM COATED ORAL at 09:08

## 2023-08-05 RX ADMIN — LIDOCAINE HYDROCHLORIDE 10 ML: 10 INJECTION, SOLUTION INFILTRATION; PERINEURAL at 02:08

## 2023-08-05 RX ADMIN — INSULIN DETEMIR 24 UNITS: 100 INJECTION, SOLUTION SUBCUTANEOUS at 08:08

## 2023-08-05 RX ADMIN — HYDROCODONE BITARTRATE AND ACETAMINOPHEN 1 TABLET: 5; 325 TABLET ORAL at 09:08

## 2023-08-05 RX ADMIN — HYDROCODONE BITARTRATE AND ACETAMINOPHEN 1 TABLET: 5; 325 TABLET ORAL at 03:08

## 2023-08-05 RX ADMIN — HYDROCODONE BITARTRATE AND ACETAMINOPHEN 1 TABLET: 5; 325 TABLET ORAL at 06:08

## 2023-08-05 NOTE — ASSESSMENT & PLAN NOTE
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  -Continue Coumadin, Goal INR 2.0-3.0. subtherapeutic at 1.5 after coumadin held in setting of INR 3.4. INR 1.7 today   -LDH is stable.  Will continue to monitor daily  -Speed set at 5100, LSL 4700 rpm  -Echo: 7/31/23 EF: 10-15%, LVEDD: 6.87 cm.  No vegation appreciated     Procedure: Device Interrogation Including analysis of device parameters  Current Settings: Ventricular Assist Device  Review of device function is stable    TXP LVAD INTERROGATIONS 8/5/2023 8/5/2023 8/5/2023 8/4/2023 8/4/2023 8/4/2023 8/4/2023   Type HeartMate3 - HeartMate3 HeartMate3 HeartMate3 HeartMate3 HeartMate3   Flow 4.5 4.4 4.4 4.5 4.6 4.7 4.4   Speed 5100 5100 5100 5100 5100 5100 5100   PI 3.2 3.3 3.4 3.3 3.1 2.6 3.3   Power (Serna) 3.6 3.7 3.7 3.6 3.7 3.7 3.7   LSL - - - - - - -   Low Flow Alarm - - - - - - -   High Power Alarm - - - - - - -   Pulsatility - - - - - - -

## 2023-08-05 NOTE — SUBJECTIVE & OBJECTIVE
Interval History: Yesterday evening, Pulm administered 1st of 3 doses of intrapleural lytics. Chest tube drain was then clamped for an hour as directed. Pt experienced significant chest pain afterwards and low grade fever of 100.3 AM this morning. Discussed with Pulm this morning, these symptoms are expected after lytics are given. Will treat symptoms and repeat blood cultures x 2. Pulm plans on giving 2nd dose of lytics with lidocaine this afternoon. CBC results pending.     Continuous Infusions:   milrinone 20mg/100ml D5W (200mcg/ml) 0.25 mcg/kg/min (08/04/23 1848)     Scheduled Meds:   aspirin  81 mg Oral Daily    busPIRone  10 mg Oral BID    ceFAZolin (ANCEF) 8 g in dextrose 5 % (D5W) 500 mL CONTINUOUS INFUSION  8 g Intravenous Q24H    furosemide  80 mg Oral Daily    insulin aspart U-100  20 Units Subcutaneous TIDWM    insulin detemir U-100  24 Units Subcutaneous BID    levETIRAcetam  1,000 mg Oral BID    mirtazapine  15 mg Oral Nightly    polyethylene glycol  17 g Oral Daily    senna-docusate 8.6-50 mg  2 tablet Oral BID    sodium chloride 0.9%  10 mL Intravenous Q6H    valsartan  40 mg Oral BID    warfarin  6 mg Oral Daily     PRN Meds:acetaminophen, dextrose 10%, dextrose 10%, glucagon (human recombinant), glucose, glucose, HYDROcodone-acetaminophen, insulin aspart U-100, Flushing PICC Protocol **AND** sodium chloride 0.9% **AND** sodium chloride 0.9%    Review of patient's allergies indicates:   Allergen Reactions    Bumex [bumetanide] Hives    Torsemide Hives     Objective:     Vital Signs (Most Recent):  Temp: 100.3 °F (37.9 °C) (08/05/23 0723)  Pulse: (!) 130 (08/05/23 0723)  Resp: 20 (08/05/23 0807)  BP: (!) 70/0 (08/05/23 0723)  SpO2: 96 % (08/05/23 0723) Vital Signs (24h Range):  Temp:  [98.4 °F (36.9 °C)-100.3 °F (37.9 °C)] 100.3 °F (37.9 °C)  Pulse:  [122-131] 130  Resp:  [17-20] 20  SpO2:  [95 %-100 %] 96 %  BP: ()/(0-57) 70/0     Patient Vitals for the past 72 hrs (Last 3 readings):   Weight    08/03/23 0750 90.5 kg (199 lb 8.3 oz)       Body mass index is 24.94 kg/m².      Intake/Output Summary (Last 24 hours) at 8/5/2023 0819  Last data filed at 8/5/2023 0610  Gross per 24 hour   Intake 1279 ml   Output 2584 ml   Net -1305 ml         Hemodynamic Parameters:  CVP:  [12 mmHg] 12 mmHg    Telemetry: ST       Physical Exam  Constitutional:       Appearance: Normal appearance.   HENT:      Head: Normocephalic and atraumatic.   Eyes:      Conjunctiva/sclera: Conjunctivae normal.      Pupils: Pupils are equal, round, and reactive to light.   Neck:      Comments: Do not appreciate elevated neck veins  Cardiovascular:      Rate and Rhythm: Regular rhythm. Tachycardia present.      Comments: Smooth VAD hum  Pulmonary:      Effort: Pulmonary effort is normal.      Breath sounds: Normal breath sounds.   Abdominal:      General: Bowel sounds are normal.      Palpations: Abdomen is soft.   Musculoskeletal:         General: No swelling. Normal range of motion.      Cervical back: Normal range of motion and neck supple.   Skin:     General: Skin is warm and dry.      Capillary Refill: Capillary refill takes 2 to 3 seconds.   Neurological:      General: No focal deficit present.      Mental Status: He is alert and oriented to person, place, and time.   Psychiatric:         Mood and Affect: Mood normal.         Behavior: Behavior normal.         Thought Content: Thought content normal.         Judgment: Judgment normal.            Significant Labs:  CBC:  Recent Labs   Lab 08/02/23  0604 08/03/23 0441 08/04/23 0426   WBC 24.71* 20.28* 18.63*   RBC 4.09* 3.84* 3.63*   HGB 11.1* 10.4* 10.0*   HCT 33.5* 32.6* 31.0*    376 324   MCV 82 85 85   MCH 27.1 27.1 27.5   MCHC 33.1 31.9* 32.3       BNP:  Recent Labs   Lab 07/31/23  0501 08/02/23  0604 08/04/23  0426   * 82 104*       CMP:  Recent Labs   Lab 08/03/23 0441 08/04/23 0426 08/05/23  0634   * 186* 213*   CALCIUM 9.0 9.3 9.0   ALBUMIN 2.3* 2.3*  2.2*   PROT 7.2 7.4 7.4   * 133* 133*   K 4.1 4.1 4.1   CO2 22* 24 24    100 99   BUN 27* 24* 22*   CREATININE 1.2 1.1 1.0   ALKPHOS 159* 155* 157*   ALT 29 18 15   AST 54* 31 27   BILITOT 0.7 0.8 1.1*        Coagulation:   Recent Labs   Lab 08/03/23  0441 08/04/23  0426 08/05/23  0634   INR 1.5*  1.5* 1.5*  1.5* 1.7*  1.7*   APTT 30.3 33.2* 32.6*       LDH:  Recent Labs   Lab 08/03/23  0441 08/04/23  0426 08/05/23  0634   * 364* 320*       Microbiology:  Microbiology Results (last 7 days)       Procedure Component Value Units Date/Time    Blood culture [[id number]]     Order Status: Sent Specimen: Blood     Blood culture [[id number]]     Order Status: Sent Specimen: Blood     AFB Culture & Smear [[id number]] Collected: 08/03/23 1655    Order Status: Completed Specimen: Pleural Fluid Updated: 08/04/23 2127     AFB Culture & Smear Culture in progress     AFB CULTURE STAIN No acid fast bacilli seen.    Blood culture [[id number]] Collected: 07/30/23 0810    Order Status: Completed Specimen: Blood Updated: 08/04/23 1012     Blood Culture, Routine No growth after 5 days.    Blood culture [[id number]] Collected: 07/30/23 0810    Order Status: Completed Specimen: Blood Updated: 08/04/23 1012     Blood Culture, Routine No growth after 5 days.    Aerobic culture [[id number]] Collected: 08/03/23 1655    Order Status: Completed Specimen: Pleural Fluid Updated: 08/04/23 0858     Aerobic Bacterial Culture No growth    Gram stain [[id number]] Collected: 08/03/23 1655    Order Status: Completed Specimen: Pleural Fluid Updated: 08/03/23 2243     Gram Stain Result Rare WBC's      Rare Gram positive cocci    Culture, Anaerobic [[id number]] Collected: 08/03/23 1655    Order Status: Sent Specimen: Pleural Fluid Updated: 08/03/23 1714    Blood culture [[id number]] Collected: 07/29/23 1130    Order Status: Completed Specimen: Blood Updated: 08/03/23 1412     Blood Culture, Routine No growth after 5 days.    Narrative:       Please obtain 2 sets after RIJ is pulled, thank you    Blood culture [[id number]] Collected: 07/29/23 1132    Order Status: Completed Specimen: Blood Updated: 08/03/23 1412     Blood Culture, Routine No growth after 5 days.    Narrative:      Please obtain 2 sets after RIJ is pulled, thank you  Rt hand    Blood culture [[id number]]  (Abnormal) Collected: 07/28/23 0940    Order Status: Completed Specimen: Blood from Peripheral, Left Hand Updated: 08/01/23 0834     Blood Culture, Routine Gram stain aer bottle: Gram positive cocci in clusters resembling Staph      Positive results previously called 07/30/2023  13:09      STAPHYLOCOCCUS AUREUS  ID consult required at [facility] and [facility].  For susceptibility see order #[id number]      Blood culture [[id number]]  (Abnormal)  (Susceptibility) Collected: 07/28/23 0939    Order Status: Completed Specimen: Blood from Antecubital, Left Arm Updated: 07/31/23 1026     Blood Culture, Routine Gram stain aer bottle: Gram positive cocci in clusters resembling Staph      Positive results previously called 07/29/2023  21:20      STAPHYLOCOCCUS AUREUS  ID consult required at [facility] and [facility] locations.      Blood culture [[id number]] Collected: 07/25/23 0450    Order Status: Completed Specimen: Blood Updated: 07/30/23 0612     Blood Culture, Routine No growth after 5 days.    Blood culture [[id number]]  (Abnormal)  (Susceptibility) Collected: 07/25/23 0451    Order Status: Completed Specimen: Blood Updated: 07/29/23 1045     Blood Culture, Routine Gram stain aer bottle: Gram positive cocci in clusters resembling Staph      Results called to and read back by:Laura Kilgore RN 07/27/2023  13:01      STAPHYLOCOCCUS AUREUS  ID consult required at [facility] and [facility].              I have reviewed all pertinent labs within the past 24 hours.    Estimated Creatinine Clearance: 119.7 mL/min (based on SCr of 1 mg/dL).    Diagnostic Results:  I  have reviewed and interpreted all pertinent imaging results/findings within the past 24 hours.

## 2023-08-05 NOTE — ASSESSMENT & PLAN NOTE
-Patient presents with elevated fevers, white blood cell count, elevated lactic acid.   -Possible Secondary to COVID-19 infection.  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 Repeated 7/29 with NGTD. Repeated again 7/30 NGTD  -PICC line replaced 8/1  -ECHO done 7/31 and no vegetations appreciated   -CT 8/1 with loculated fluid along Left lateral hear border, thoracic surgery consulted, not a candidate for VATS. Chest tube placed with IR 8/3, pleural fluid cultures positive for GPC  -Continue Ancef 8g IV q 24 hours for total of 6 weeks with end date 9/14 (6 weeks after chest tube placement). ID signed off 8/4  -Low grade fevers overnight after receiving intrapleural lytics. Will repeat blood cultures x 2

## 2023-08-05 NOTE — TREATMENT PLAN
Intrapleural Lytic Administration Note     Dose 2 of max 3, once daily dosing.  Initial amount in drainage canister before administration: 600 mL serosanguinous fluid, 300cc over last 24 hours per nursing collateral.     2:00, administered into chest tube 5mg dornase yoli and 10mg alteplase in sterile water (30mL each) also 5mL lidocaine 1% and 10 mL NS to flush. Total volume 75 mL. Locked stopcock for lytics to dwell in intrapleural space for 60 min.      3:00, opened stopcock and placed tube back to suction, with rush of 60 mL serosanguinous fluid into canister.     Significant amount of pain with manipulation of pleural space which intrapleural lidocaine did not ameliorate. Good response to a small dose of IV dilaudid.     Natty Fleming MD  [facility]

## 2023-08-05 NOTE — ASSESSMENT & PLAN NOTE
-CT imaging this admission consistent with small left sided pleural effusion and complicated pleural around left heart border  -Pulm consulted- suspected para-pneumonic process vs empyema in setting of MSSA bacteremia  -Thoracic surgery consulted for VATS and patient not candidate  -IR consulted for CT guided chest tube placed in posterior collection 8/3  -pulm following and plan to administer intrapleural lytics x 3 days (started 8/4)

## 2023-08-05 NOTE — PLAN OF CARE
Critical Care Plan of Care    Approached by Cardiology regarding patient being followed by Pulmonary. Patient with L PNA complicated by empyema now s/p IR-placed pleural catheter. Patient s/p 1st dose of intrapleural lytics this evening. Instilled at 1815, and released at 1915 per nursing documentation one hour later. However, approx 10pm, pleural catheter was clamped by nurse, and patient developed acute onset of pain near pleural catheter site. Per Cards, site itself appears c/d/I. No change in patient's respiratory status, vitals, or O2 reqs per Cards. CXR obtained and personally reviewed by myself. Pleural catheter appearing to be in stable position with no significant change from prior imaging; no PTX.     Pulmonary documentation and orders reviewed. Unsure why pleural catheter was clamped at this point tonight. Have advised Cardiology to release catheter and resume suction at -20. Pain management per Cardiology.    If questions/concerns, please contact Critical Care overnight (#[phone]).    Baudilio Christy MD  [facility]  8/4/2023 @ 10:55 PM

## 2023-08-05 NOTE — ASSESSMENT & PLAN NOTE
-H/O chronic MSSA DLES infection, on suppressive Doxycyline at home  -See above sepsis.  Now with new gram + bacteremia  -On Ancef  -Repeat CT of C/A/P 8/1-prior stranding noted around DL site resolved

## 2023-08-05 NOTE — PLAN OF CARE
Problem: Adult Inpatient Plan of Care  Goal: Patient-Specific Goal (Individualized)  Outcome: Ongoing, Progressing  Flowsheets (Taken 8/5/2023 7466)  Anxieties, Fears or Concerns: None voiced  Individualized Care Needs:   Maintain milrinone gtt infusing as per orders. Maintain ABX. Perform LVAD dressing daily with kit due tomorrow. LVAD # WNL. COVID-19 precautions maintained. CT care performed. Pain managed.

## 2023-08-05 NOTE — CARE UPDATE
Care Update:     No acute events overnight. Patient on the CSU in room 314/314 A. Blood glucose stable. BG mostly at goal on current insulin regimen (SSI, prandial, and basal insulin ). Steroid use- None.    Renal function- Normal   Vasopressors-  None       Diet diabetic [facility]; 2800 Calorie     POCT Glucose   Date Value Ref Range Status   08/05/2023 215 (H) 70 - 110 mg/dL Final   08/04/2023 138 (H) 70 - 110 mg/dL Final   08/04/2023 226 (H) 70 - 110 mg/dL Final   08/04/2023 96 70 - 110 mg/dL Final   08/04/2023 231 (H) 70 - 110 mg/dL Final   08/04/2023 255 (H) 70 - 110 mg/dL Final   08/03/2023 162 (H) 70 - 110 mg/dL Final   08/03/2023 117 (H) 70 - 110 mg/dL Final   08/03/2023 183 (H) 70 - 110 mg/dL Final   08/03/2023 170 (H) 70 - 110 mg/dL Final   08/03/2023 167 (H) 70 - 110 mg/dL Final   08/03/2023 233 (H) 70 - 110 mg/dL Final   08/02/2023 194 (H) 70 - 110 mg/dL Final   08/02/2023 159 (H) 70 - 110 mg/dL Final   08/02/2023 198 (H) 70 - 110 mg/dL Final     Lab Results   Component Value Date    HGBA1C 6.7 (H) 07/22/2023       Diabetes Medications               insulin aspart U-100 (NOVOLOG) 100 unit/mL (3 mL) InPn pen Inject 16 Units into the skin 3 (three) times daily with meals. Plus Sliding Scale: 151-200: +1, 201-250: +2, 251-300: +3, 301-350: +4 and call MD; Max Daily Dose: 60 units    insulin detemir U-100, Levemir, 100 unit/mL (3 mL) SubQ InPn pen Inject 22 Units into the skin 2 (two) times daily.          Endocrine  Type 2 diabetes mellitus with hyperglycemia  Endocrinology consulted for BG management.   BG goal 140-180     - Levemir (Insulin Detemir) 24 units BID   - Novolog (Insulin Aspart) 20 units TIDWM and prn for BG excursions [facility] SSI (150/25)   - BG checks AC/HS/0200  - Hypoglycemia protocol in place  - If blood glucose greater than 300, please ask patient not to eat food or drink anything other than water until correctional insulin has brought it back below 250    ** Please notify Endocrine  for any change and/or advance in diet**  ** Please call Endocrine for any BG related issues **    Discharge Planning:   TBD. Please notify endocrinology prior to discharge.

## 2023-08-05 NOTE — PROGRESS NOTES
08/05/23 0631   Invasive Hemodynamic Monitoring   CVP (mmHg) 12 mmHg     CVP x2 w/ value above.  Good wave form on monitor.

## 2023-08-05 NOTE — CARE UPDATE
RAPID RESPONSE NURSE ROUND       Rounding completed with charge RNJane for MEWs reports NAD. No additional concerns verbalized at this time. Instructed to call [phone] for further concerns or assistance.

## 2023-08-05 NOTE — PROGRESS NOTES
2025  KIRT Loera MD notified patient had been increasing pain 10/10 to drain site per patient that started an hr since releasing clamp.   Patient administered pain medication @1922. Pleural catheter draining bright red blood w/ total of <150 cc.  Orders for STAT CXR.  MD  to assess patient at bedside.  VSS. Will continue to monitor.      2033: X-Ray notified of STAT CXR.      2135:CXR completed  KIRT Loera MD aware.  Callback w/ no further orders.      2154:  KIRT Loera MD notified patient continues to have increasing pain 10/10 to site w/ no relief. MD to come to bedside.  Will continue to monitor.      2226: KIRT Loera MD notified RN that he is currently talking to pulmonology team for further recommendations and that drain remains unclamped per order and draining.    MD to place order for pain medication. Will continue to monitor.      8/5     0455: Baudilio Christy MD notified upon reading note placed @ 2255 that patient pleural catheter was not clamped off during shift.  Per the order pleural drain was unclamped by previous RN.  KIRT Loera MD notified patient had reported patient w/ increasing pain that started an hr since releasing clamp .  Pleural catheter draining bright red blood w/ total of <150 cc.  Per order STAT CXR was ordered and KIRT Loera MD came to bedside. MD aware per primary RN that Drain was not clamped at all after released @ 715pm and that it was a miscommunication by primary team to pulmonology.  MD Jaelyn advised to place note stating the miscommunication.      0512:  KIRT Loera MD notified  of the note written by MD Jaelyn and the miscommunication between the teams.  MD aware that MD Jaelyn was contacted by primary RN of the miscommunication written in the note.  Will continue to monitor.

## 2023-08-05 NOTE — PROGRESS NOTES
08/05/2023  Miracle Caballero    Current provider:  Marlo Marques MD    Device interrogation:  TXP LVAD INTERROGATIONS 8/5/2023 8/5/2023 8/5/2023 8/5/2023 8/4/2023 8/4/2023 8/4/2023   Type HeartMate3 HeartMate3 - HeartMate3 HeartMate3 HeartMate3 HeartMate3   Flow 4.5 4.5 4.4 4.4 4.5 4.6 4.7   Speed 5100 5100 5100 5100 5100 5100 5100   PI 3.6 3.2 3.3 3.4 3.3 3.1 2.6   Power (Serna) 3.7 3.6 3.7 3.7 3.6 3.7 3.7   LSL - - - - - - -   Low Flow Alarm - - - - - - -   High Power Alarm - - - - - - -   Pulsatility - - - - - - -          Rounded on Kevan Queen to ensure all mechanical assist device settings (IABP or VAD) were appropriate and all parameters were within limits.  I was able to ensure all back up equipment was present, the staff had no issues, and the Perfusion Department daily rounding was complete.      For implantable VADs: Interrogation of Ventricular assist device was performed with analysis of device parameters and review of device function. I have personally reviewed the interrogation findings and agree with findings as stated.     In emergency, the nursing units have been notified to contact the perfusion department either by:  Calling [id number] from 630am to 4pm Mon thru Fri, utilizing the On-Call Finder functionality of Epic and searching for Perfusion, or by contacting the hospital  from 4pm to 630am and on weekends and asking to speak with the perfusionist on call.    1:55 PM

## 2023-08-05 NOTE — PROGRESS NOTES
[facility] - Cardiology Stepdown  Heart Transplant  Progress Note    Patient Name: Kevan Queen  MRN: [id number]  Admission Date: 7/22/2023  Hospital Length of Stay: 14 days  Attending Physician: Marlo Marques MD  Primary Care Provider: Rosio Armendariz FNP  Principal Problem:Sepsis    Subjective:     Interval History: Yesterday evening, Pulm administered 1st of 3 doses of intrapleural lytics. Chest tube drain was then clamped for an hour as directed. Pt experienced significant chest pain afterwards and low grade fever of 100.3 AM this morning. Discussed with Pulm this morning, these symptoms are expected after lytics are given. Will treat symptoms and repeat blood cultures x 2. Pulm plans on giving 2nd dose of lytics with lidocaine this afternoon. CBC results pending.     Continuous Infusions:   milrinone 20mg/100ml D5W (200mcg/ml) 0.25 mcg/kg/min (08/04/23 1848)     Scheduled Meds:   aspirin  81 mg Oral Daily    busPIRone  10 mg Oral BID    ceFAZolin (ANCEF) 8 g in dextrose 5 % (D5W) 500 mL CONTINUOUS INFUSION  8 g Intravenous Q24H    furosemide  80 mg Oral Daily    insulin aspart U-100  20 Units Subcutaneous TIDWM    insulin detemir U-100  24 Units Subcutaneous BID    levETIRAcetam  1,000 mg Oral BID    mirtazapine  15 mg Oral Nightly    polyethylene glycol  17 g Oral Daily    senna-docusate 8.6-50 mg  2 tablet Oral BID    sodium chloride 0.9%  10 mL Intravenous Q6H    valsartan  40 mg Oral BID    warfarin  6 mg Oral Daily     PRN Meds:acetaminophen, dextrose 10%, dextrose 10%, glucagon (human recombinant), glucose, glucose, HYDROcodone-acetaminophen, insulin aspart U-100, Flushing PICC Protocol **AND** sodium chloride 0.9% **AND** sodium chloride 0.9%    Review of patient's allergies indicates:   Allergen Reactions    Bumex [bumetanide] Hives    Torsemide Hives     Objective:     Vital Signs (Most Recent):  Temp: 100.3 °F (37.9 °C) (08/05/23 0723)  Pulse: (!) 130 (08/05/23 0723)  Resp: 20  (08/05/23 0807)  BP: (!) 70/0 (08/05/23 0723)  SpO2: 96 % (08/05/23 0723) Vital Signs (24h Range):  Temp:  [98.4 °F (36.9 °C)-100.3 °F (37.9 °C)] 100.3 °F (37.9 °C)  Pulse:  [122-131] 130  Resp:  [17-20] 20  SpO2:  [95 %-100 %] 96 %  BP: ()/(0-57) 70/0     Patient Vitals for the past 72 hrs (Last 3 readings):   Weight   08/03/23 0750 90.5 kg (199 lb 8.3 oz)       Body mass index is 24.94 kg/m².      Intake/Output Summary (Last 24 hours) at 8/5/2023 0819  Last data filed at 8/5/2023 0610  Gross per 24 hour   Intake 1279 ml   Output 2584 ml   Net -1305 ml         Hemodynamic Parameters:  CVP:  [12 mmHg] 12 mmHg    Telemetry: ST       Physical Exam  Constitutional:       Appearance: Normal appearance.   HENT:      Head: Normocephalic and atraumatic.   Eyes:      Conjunctiva/sclera: Conjunctivae normal.      Pupils: Pupils are equal, round, and reactive to light.   Neck:      Comments: Do not appreciate elevated neck veins  Cardiovascular:      Rate and Rhythm: Regular rhythm. Tachycardia present.      Comments: Smooth VAD hum  Pulmonary:      Effort: Pulmonary effort is normal.      Breath sounds: Normal breath sounds.   Abdominal:      General: Bowel sounds are normal.      Palpations: Abdomen is soft.   Musculoskeletal:         General: No swelling. Normal range of motion.      Cervical back: Normal range of motion and neck supple.   Skin:     General: Skin is warm and dry.      Capillary Refill: Capillary refill takes 2 to 3 seconds.   Neurological:      General: No focal deficit present.      Mental Status: He is alert and oriented to person, place, and time.   Psychiatric:         Mood and Affect: Mood normal.         Behavior: Behavior normal.         Thought Content: Thought content normal.         Judgment: Judgment normal.            Significant Labs:  CBC:  Recent Labs   Lab 08/02/23  0604 08/03/23  0441 08/04/23  0426   WBC 24.71* 20.28* 18.63*   RBC 4.09* 3.84* 3.63*   HGB 11.1* 10.4* 10.0*   HCT  33.5* 32.6* 31.0*    376 324   MCV 82 85 85   MCH 27.1 27.1 27.5   MCHC 33.1 31.9* 32.3       BNP:  Recent Labs   Lab 07/31/23  0501 08/02/23  0604 08/04/23  0426   * 82 104*       CMP:  Recent Labs   Lab 08/03/23  0441 08/04/23  0426 08/05/23  0634   * 186* 213*   CALCIUM 9.0 9.3 9.0   ALBUMIN 2.3* 2.3* 2.2*   PROT 7.2 7.4 7.4   * 133* 133*   K 4.1 4.1 4.1   CO2 22* 24 24    100 99   BUN 27* 24* 22*   CREATININE 1.2 1.1 1.0   ALKPHOS 159* 155* 157*   ALT 29 18 15   AST 54* 31 27   BILITOT 0.7 0.8 1.1*        Coagulation:   Recent Labs   Lab 08/03/23  0441 08/04/23  0426 08/05/23  0634   INR 1.5*  1.5* 1.5*  1.5* 1.7*  1.7*   APTT 30.3 33.2* 32.6*       LDH:  Recent Labs   Lab 08/03/23  0441 08/04/23  0426 08/05/23  0634   * 364* 320*       Microbiology:  Microbiology Results (last 7 days)       Procedure Component Value Units Date/Time    Blood culture [[id number]]     Order Status: Sent Specimen: Blood     Blood culture [[id number]]     Order Status: Sent Specimen: Blood     AFB Culture & Smear [[id number]] Collected: 08/03/23 1655    Order Status: Completed Specimen: Pleural Fluid Updated: 08/04/23 2127     AFB Culture & Smear Culture in progress     AFB CULTURE STAIN No acid fast bacilli seen.    Blood culture [[id number]] Collected: 07/30/23 0810    Order Status: Completed Specimen: Blood Updated: 08/04/23 1012     Blood Culture, Routine No growth after 5 days.    Blood culture [[id number]] Collected: 07/30/23 0810    Order Status: Completed Specimen: Blood Updated: 08/04/23 1012     Blood Culture, Routine No growth after 5 days.    Aerobic culture [[id number]] Collected: 08/03/23 1655    Order Status: Completed Specimen: Pleural Fluid Updated: 08/04/23 0858     Aerobic Bacterial Culture No growth    Gram stain [[id number]] Collected: 08/03/23 1655    Order Status: Completed Specimen: Pleural Fluid Updated: 08/03/23 2243     Gram Stain Result Rare WBC's      Rare Gram  positive cocci    Culture, Anaerobic [[id number]] Collected: 08/03/23 1655    Order Status: Sent Specimen: Pleural Fluid Updated: 08/03/23 1714    Blood culture [[id number]] Collected: 07/29/23 1130    Order Status: Completed Specimen: Blood Updated: 08/03/23 1412     Blood Culture, Routine No growth after 5 days.    Narrative:      Please obtain 2 sets after RIJ is pulled, thank you    Blood culture [[id number]] Collected: 07/29/23 1132    Order Status: Completed Specimen: Blood Updated: 08/03/23 1412     Blood Culture, Routine No growth after 5 days.    Narrative:      Please obtain 2 sets after RIJ is pulled, thank you  Rt hand    Blood culture [[id number]]  (Abnormal) Collected: 07/28/23 0940    Order Status: Completed Specimen: Blood from Peripheral, Left Hand Updated: 08/01/23 0834     Blood Culture, Routine Gram stain aer bottle: Gram positive cocci in clusters resembling Staph      Positive results previously called 07/30/2023  13:09      STAPHYLOCOCCUS AUREUS  ID consult required at [facility].  For susceptibility see order #[id number]      Blood culture [[id number]]  (Abnormal)  (Susceptibility) Collected: 07/28/23 0939    Order Status: Completed Specimen: Blood from Antecubital, Left Arm Updated: 07/31/23 1026     Blood Culture, Routine Gram stain aer bottle: Gram positive cocci in clusters resembling Staph      Positive results previously called 07/29/2023  21:20      STAPHYLOCOCCUS AUREUS  ID consult required at [facility].      Blood culture [[id number]] Collected: 07/25/23 0450    Order Status: Completed Specimen: Blood Updated: 07/30/23 0612     Blood Culture, Routine No growth after 5 days.    Blood culture [[id number]]  (Abnormal)  (Susceptibility) Collected: 07/25/23 0451    Order Status: Completed Specimen: Blood Updated: 07/29/23 1045     Blood Culture, Routine Gram stain aer bottle: Gram positive cocci in clusters resembling Staph       Results called to and read back by:Laura Kilgore RN 07/27/2023  13:01      STAPHYLOCOCCUS AUREUS  ID consult required at [facility].[place] and [place] locations.              I have reviewed all pertinent labs within the past 24 hours.    Estimated Creatinine Clearance: 119.7 mL/min (based on SCr of 1 mg/dL).    Diagnostic Results:  I have reviewed and interpreted all pertinent imaging results/findings within the past 24 hours.    Assessment and Plan:     Patient is a 36 yo black male with stage d HFrEF, NICM, ? 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 He underwent RVAD removal and chest closure 6/30/2022.  He was weaned off  but he had to restarted due to RVF. He was eventually transitioned to milrinone (secondary to  shortage) now on 0.25 mcg/kg/min.  Patient also has history of driveline infection.  Patient presented to the emergency room today because of pleuritic chest pain that has been going on for the last 3 days however it was more intense today.  Also complains of having shortness of breath and fevers associated with it.  He was spiking fever of 102 in the emergency room with elevated white blood cell count up to 17,000. He was treated with Zosyn.  He was sent here for admission.  Patient was admitted a month back for COVID infection for which he received remdesivir for 3 days.  He is on doxycycline for chronic driveline infection.  He is on Milrinone for RV failure.  Patient denies having any low flow alarms on the pump.  Also denies having any lower extremity edema, increased discharge from his driveline site.  Patient has ground-glass opacities on imaging from outside hospital      * Sepsis  -Patient presents with elevated fevers, white blood cell count, elevated lactic acid.   -Possible Secondary to COVID-19 infection.  Completed  Remdesivir and Dexamethasone  -H/O chronic MSSA DLES infection for which he is on Doxycycline at home  -Blood cxs here +ve for MSSA through 7/28. Repeated 7/29 with NGTD. Repeated again 7/30 NGTD  -PICC line replaced 8/1  -ECHO done 7/31 and no vegetations appreciated   -CT 8/1 with loculated fluid along Left lateral hear border, thoracic surgery consulted, not a candidate for VATS. Chest tube placed with IR 8/3, pleural fluid cultures positive for GPC  -Continue Ancef 8g IV q 24 hours for total of 6 weeks with end date 9/14 (6 weeks after chest tube placement). ID signed off 8/4  -Low grade fevers overnight after receiving intrapleural lytics. 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  Now with new gram + bacteremia  -On Ancef  -Repeat CT of C/A/P 8/1-prior stranding noted around DL site resolved    Bacteremia due to Staphylococcus  -See sepsis    Seizure-like activity  -On on Keppra    LVAD (left ventricular assist device) present  -HeartMate 3 Implanted on 6/29/2022 as DT with RV failure on milrinone at 0.25 mcg/kg/min.  -Continue Coumadin, Goal INR 2.0-3.0. subtherapeutic at 1.5 after coumadin held in setting of INR 3.4. INR 1.7 today   -LDH is stable.  Will continue to monitor daily  -Speed set at 5100, LSL 4700 rpm  -Echo: 7/31/23 EF: 10-15%, LVEDD: 6.87 cm.  No vegation appreciated     Procedure:  Device Interrogation Including analysis of device parameters  Current Settings: Ventricular Assist Device  Review of device function is stable    TXP LVAD INTERROGATIONS 8/5/2023 8/5/2023 8/5/2023 8/4/2023 8/4/2023 8/4/2023 8/4/2023   Type HeartMate3 - HeartMate3 HeartMate3 HeartMate3 HeartMate3 HeartMate3   Flow 4.5 4.4 4.4 4.5 4.6 4.7 4.4   Speed 5100 5100 5100 5100 5100 5100 5100   PI 3.2 3.3 3.4 3.3 3.1 2.6 3.3   Power (Serna) 3.6 3.7 3.7 3.6 3.7 3.7 3.7   LSL - - - - - - -   Low Flow Alarm - - - - - - -   High Power Alarm - - - - - - -   Pulsatility - - - - - - -       Type 2 diabetes mellitus with hyperglycemia  -Will keep him on sliding scale and a.c. HS    Chronic combined systolic and diastolic heart failure  -NICM  -Last 2D Echo 10/31/22: LVEF 10%, LVEDD 7.25 cm  -Euvolemic on examination today, CVP 8 via PICC  -Current diuretic regimen: Lasix 80mg Qdaily  -Status post LVAD.  -GDMT: On Valsartan, Aldactone and Toprol at home. Valsartan 40 mg po bid resumed. K+ has trended up at 4.8 - D/C'd KCL 40 mEq bid. -Monitor K+ now that ARB resumed  -2g Na dietary restriction, 1500 mL fluid restriction, strict I/Os                Denise Espinoza PA-C  Heart Transplant  [facility] - Cardiology Stepdown

## 2023-08-05 NOTE — CLINICAL REVIEW
IP Sepsis Screen (most recent)       Sepsis Screen (IP) - 08/05/23 1529       Is the patient's history or complaint suggestive of a possible infection? Yes  -LW    Are there at least two of the following signs and symptoms present? Yes  -LW    Sepsis signs/symptoms - Tachycardia Tachycardia     >90  -LW    Sepsis signs/symptoms - WBC WBC < 4,000 or WBC > 12,000  -LW    Are any of the following organ dysfunction criteria present and not considered to be due to a chronic condition? Yes   LVAD -LW    Organ Dysfunction Criteria SBP < 90 or MAP < 65  -LW    Organ Dysfunction Criteria INR > 1.5 or aPTT > 60  -LW    Initiate Sepsis Protocol No  -LW    Reason sepsis not considered Pt. receiving appropriate management  -LW              User Key  (r) = Recorded By, (t) = Taken By, (c) = Cosigned By      Initials Name    Jelena Turpin, RN

## 2023-08-06 LAB
ALBUMIN SERPL BCP-MCNC: 2.3 G/DL (ref 3.5–5.2)
ALP SERPL-CCNC: 193 U/L (ref 55–135)
ALT SERPL W/O P-5'-P-CCNC: 17 U/L (ref 10–44)
ANION GAP SERPL CALC-SCNC: 10 MMOL/L (ref 8–16)
ANION GAP SERPL CALC-SCNC: 13 MMOL/L (ref 8–16)
APTT PPP: 34.9 SEC (ref 21–32)
AST SERPL-CCNC: 43 U/L (ref 10–40)
BASOPHILS # BLD AUTO: 0.03 K/UL (ref 0–0.2)
BASOPHILS NFR BLD: 0.2 % (ref 0–1.9)
BILIRUB SERPL-MCNC: 1.3 MG/DL (ref 0.1–1)
BUN SERPL-MCNC: 24 MG/DL (ref 6–20)
BUN SERPL-MCNC: 24 MG/DL (ref 6–20)
CALCIUM SERPL-MCNC: 8.8 MG/DL (ref 8.7–10.5)
CALCIUM SERPL-MCNC: 9.3 MG/DL (ref 8.7–10.5)
CHLORIDE SERPL-SCNC: 92 MMOL/L (ref 95–110)
CHLORIDE SERPL-SCNC: 94 MMOL/L (ref 95–110)
CO2 SERPL-SCNC: 24 MMOL/L (ref 23–29)
CO2 SERPL-SCNC: 25 MMOL/L (ref 23–29)
CREAT SERPL-MCNC: 1.3 MG/DL (ref 0.5–1.4)
CREAT SERPL-MCNC: 1.3 MG/DL (ref 0.5–1.4)
DIFFERENTIAL METHOD: ABNORMAL
EOSINOPHIL # BLD AUTO: 0.1 K/UL (ref 0–0.5)
EOSINOPHIL NFR BLD: 0.5 % (ref 0–8)
ERYTHROCYTE [DISTWIDTH] IN BLOOD BY AUTOMATED COUNT: 20.4 % (ref 11.5–14.5)
EST. GFR  (NO RACE VARIABLE): >60 ML/MIN/1.73 M^2
EST. GFR  (NO RACE VARIABLE): >60 ML/MIN/1.73 M^2
GLUCOSE SERPL-MCNC: 148 MG/DL (ref 70–110)
GLUCOSE SERPL-MCNC: 244 MG/DL (ref 70–110)
HCT VFR BLD AUTO: 29.3 % (ref 40–54)
HGB BLD-MCNC: 9.3 G/DL (ref 14–18)
IMM GRANULOCYTES # BLD AUTO: 0.12 K/UL (ref 0–0.04)
IMM GRANULOCYTES NFR BLD AUTO: 0.7 % (ref 0–0.5)
INR PPP: 1.9 (ref 0.8–1.2)
INR PPP: 1.9 (ref 0.8–1.2)
LDH SERPL L TO P-CCNC: 407 U/L (ref 110–260)
LYMPHOCYTES # BLD AUTO: 2.4 K/UL (ref 1–4.8)
LYMPHOCYTES NFR BLD: 13.4 % (ref 18–48)
MAGNESIUM SERPL-MCNC: 2.1 MG/DL (ref 1.6–2.6)
MCH RBC QN AUTO: 27 PG (ref 27–31)
MCHC RBC AUTO-ENTMCNC: 31.7 G/DL (ref 32–36)
MCV RBC AUTO: 85 FL (ref 82–98)
MONOCYTES # BLD AUTO: 1.5 K/UL (ref 0.3–1)
MONOCYTES NFR BLD: 8 % (ref 4–15)
NEUTROPHILS # BLD AUTO: 14 K/UL (ref 1.8–7.7)
NEUTROPHILS NFR BLD: 77.2 % (ref 38–73)
NRBC BLD-RTO: 0 /100 WBC
PHOSPHATE SERPL-MCNC: 3.2 MG/DL (ref 2.7–4.5)
PLATELET # BLD AUTO: 296 K/UL (ref 150–450)
PMV BLD AUTO: 9.5 FL (ref 9.2–12.9)
POCT GLUCOSE: 119 MG/DL (ref 70–110)
POCT GLUCOSE: 181 MG/DL (ref 70–110)
POCT GLUCOSE: 198 MG/DL (ref 70–110)
POCT GLUCOSE: 201 MG/DL (ref 70–110)
POCT GLUCOSE: 70 MG/DL (ref 70–110)
POTASSIUM SERPL-SCNC: 4.1 MMOL/L (ref 3.5–5.1)
POTASSIUM SERPL-SCNC: 4.3 MMOL/L (ref 3.5–5.1)
PROT SERPL-MCNC: 7.6 G/DL (ref 6–8.4)
PROTHROMBIN TIME: 20.1 SEC (ref 9–12.5)
PROTHROMBIN TIME: 20.1 SEC (ref 9–12.5)
RBC # BLD AUTO: 3.44 M/UL (ref 4.6–6.2)
SODIUM SERPL-SCNC: 128 MMOL/L (ref 136–145)
SODIUM SERPL-SCNC: 130 MMOL/L (ref 136–145)
WBC # BLD AUTO: 18.1 K/UL (ref 3.9–12.7)

## 2023-08-06 PROCEDURE — 20600001 HC STEP DOWN PRIVATE ROOM

## 2023-08-06 PROCEDURE — 83735 ASSAY OF MAGNESIUM: CPT | Performed by: HOSPITALIST

## 2023-08-06 PROCEDURE — 93750 PR INTERROGATE VENT ASSIST DEV, IN PERSON, W PHYSICIAN ANALYSIS: ICD-10-PCS | Mod: ,,, | Performed by: INTERNAL MEDICINE

## 2023-08-06 PROCEDURE — 27000207 HC ISOLATION

## 2023-08-06 PROCEDURE — 25000003 PHARM REV CODE 250: Performed by: NURSE PRACTITIONER

## 2023-08-06 PROCEDURE — 63600175 PHARM REV CODE 636 W HCPCS: Performed by: INTERNAL MEDICINE

## 2023-08-06 PROCEDURE — 80048 BASIC METABOLIC PNL TOTAL CA: CPT | Mod: XB | Performed by: PHYSICIAN ASSISTANT

## 2023-08-06 PROCEDURE — 85610 PROTHROMBIN TIME: CPT | Performed by: HOSPITALIST

## 2023-08-06 PROCEDURE — 25000003 PHARM REV CODE 250: Performed by: PHYSICIAN ASSISTANT

## 2023-08-06 PROCEDURE — 99233 PR SUBSEQUENT HOSPITAL CARE,LEVL III: ICD-10-PCS | Mod: 95,,, | Performed by: PHYSICIAN ASSISTANT

## 2023-08-06 PROCEDURE — 84100 ASSAY OF PHOSPHORUS: CPT | Performed by: HOSPITALIST

## 2023-08-06 PROCEDURE — 25000003 PHARM REV CODE 250: Performed by: STUDENT IN AN ORGANIZED HEALTH CARE EDUCATION/TRAINING PROGRAM

## 2023-08-06 PROCEDURE — 25000003 PHARM REV CODE 250: Performed by: HOSPITALIST

## 2023-08-06 PROCEDURE — 80053 COMPREHEN METABOLIC PANEL: CPT | Performed by: NURSE PRACTITIONER

## 2023-08-06 PROCEDURE — A4216 STERILE WATER/SALINE, 10 ML: HCPCS | Performed by: INTERNAL MEDICINE

## 2023-08-06 PROCEDURE — 93750 INTERROGATION VAD IN PERSON: CPT | Mod: ,,, | Performed by: INTERNAL MEDICINE

## 2023-08-06 PROCEDURE — 25000003 PHARM REV CODE 250: Performed by: INTERNAL MEDICINE

## 2023-08-06 PROCEDURE — 99233 SBSQ HOSP IP/OBS HIGH 50: CPT | Mod: ,,, | Performed by: INTERNAL MEDICINE

## 2023-08-06 PROCEDURE — 99233 PR SUBSEQUENT HOSPITAL CARE,LEVL III: ICD-10-PCS | Mod: ,,, | Performed by: INTERNAL MEDICINE

## 2023-08-06 PROCEDURE — 99233 SBSQ HOSP IP/OBS HIGH 50: CPT | Mod: 95,,, | Performed by: PHYSICIAN ASSISTANT

## 2023-08-06 PROCEDURE — 63600175 PHARM REV CODE 636 W HCPCS: Performed by: STUDENT IN AN ORGANIZED HEALTH CARE EDUCATION/TRAINING PROGRAM

## 2023-08-06 PROCEDURE — 83615 LACTATE (LD) (LDH) ENZYME: CPT | Performed by: HOSPITALIST

## 2023-08-06 PROCEDURE — 85025 COMPLETE CBC W/AUTO DIFF WBC: CPT | Performed by: NURSE PRACTITIONER

## 2023-08-06 PROCEDURE — 85730 THROMBOPLASTIN TIME PARTIAL: CPT | Performed by: STUDENT IN AN ORGANIZED HEALTH CARE EDUCATION/TRAINING PROGRAM

## 2023-08-06 PROCEDURE — 63600175 PHARM REV CODE 636 W HCPCS: Performed by: HOSPITALIST

## 2023-08-06 PROCEDURE — 27000248 HC VAD-ADDITIONAL DAY

## 2023-08-06 RX ORDER — FUROSEMIDE 10 MG/ML
80 INJECTION INTRAMUSCULAR; INTRAVENOUS ONCE
Status: COMPLETED | OUTPATIENT
Start: 2023-08-06 | End: 2023-08-06

## 2023-08-06 RX ADMIN — HYDROCODONE BITARTRATE AND ACETAMINOPHEN 1 TABLET: 5; 325 TABLET ORAL at 10:08

## 2023-08-06 RX ADMIN — INSULIN DETEMIR 24 UNITS: 100 INJECTION, SOLUTION SUBCUTANEOUS at 10:08

## 2023-08-06 RX ADMIN — HYDROCODONE BITARTRATE AND ACETAMINOPHEN 1 TABLET: 5; 325 TABLET ORAL at 06:08

## 2023-08-06 RX ADMIN — INSULIN DETEMIR 24 UNITS: 100 INJECTION, SOLUTION SUBCUTANEOUS at 09:08

## 2023-08-06 RX ADMIN — BUSPIRONE HYDROCHLORIDE 10 MG: 10 TABLET ORAL at 09:08

## 2023-08-06 RX ADMIN — HYDROCODONE BITARTRATE AND ACETAMINOPHEN 1 TABLET: 5; 325 TABLET ORAL at 03:08

## 2023-08-06 RX ADMIN — CEFAZOLIN 8 G: 2 INJECTION, POWDER, FOR SOLUTION INTRAMUSCULAR; INTRAVENOUS at 11:08

## 2023-08-06 RX ADMIN — INSULIN ASPART 20 UNITS: 100 INJECTION, SOLUTION INTRAVENOUS; SUBCUTANEOUS at 08:08

## 2023-08-06 RX ADMIN — MILRINONE LACTATE 0.25 MCG/KG/MIN: 0.2 INJECTION, SOLUTION INTRAVENOUS at 05:08

## 2023-08-06 RX ADMIN — MIRTAZAPINE 15 MG: 15 TABLET, FILM COATED ORAL at 09:08

## 2023-08-06 RX ADMIN — MILRINONE LACTATE 0.25 MCG/KG/MIN: 0.2 INJECTION, SOLUTION INTRAVENOUS at 03:08

## 2023-08-06 RX ADMIN — FUROSEMIDE 80 MG: 10 INJECTION, SOLUTION INTRAVENOUS at 05:08

## 2023-08-06 RX ADMIN — LEVETIRACETAM 1000 MG: 500 TABLET, FILM COATED ORAL at 09:08

## 2023-08-06 RX ADMIN — VALSARTAN 40 MG: 40 TABLET, FILM COATED ORAL at 09:08

## 2023-08-06 RX ADMIN — ASPIRIN 81 MG: 81 TABLET, COATED ORAL at 09:08

## 2023-08-06 RX ADMIN — ACETAMINOPHEN 650 MG: 325 TABLET ORAL at 11:08

## 2023-08-06 RX ADMIN — FUROSEMIDE 80 MG: 80 TABLET ORAL at 09:08

## 2023-08-06 RX ADMIN — Medication 10 ML: at 06:08

## 2023-08-06 RX ADMIN — INSULIN ASPART 20 UNITS: 100 INJECTION, SOLUTION INTRAVENOUS; SUBCUTANEOUS at 05:08

## 2023-08-06 RX ADMIN — INSULIN ASPART 3 UNITS: 100 INJECTION, SOLUTION INTRAVENOUS; SUBCUTANEOUS at 08:08

## 2023-08-06 RX ADMIN — INSULIN ASPART 3 UNITS: 100 INJECTION, SOLUTION INTRAVENOUS; SUBCUTANEOUS at 05:08

## 2023-08-06 RX ADMIN — WARFARIN SODIUM 6 MG: 3 TABLET ORAL at 05:08

## 2023-08-06 RX ADMIN — INSULIN ASPART 4 UNITS: 100 INJECTION, SOLUTION INTRAVENOUS; SUBCUTANEOUS at 10:08

## 2023-08-06 NOTE — PROGRESS NOTES
"Pulmonary & Critical Care Medicine Progress Note    Subjective:  - two doses of daily intrapleural lytics placed with significant pain  - 300cc output from chest tube over 24 hours (including 75cc instilled fluids)  - Hg stable  - POCUS today without significant residual fluid at the left base, clinically improved as well with breathing comfortable per pt report    Review of Systems:   A comprehensive 12-point review of systems was performed, and is negative except for those items mentioned above in the HPI section of this note.     Vital Signs:    Vitals:    08/06/23 1534   BP:    Pulse: (!) 122   Resp:    Temp:        Fluid Balance:     Intake/Output Summary (Last 24 hours) at 8/6/2023 1615  Last data filed at 8/6/2023 1151  Gross per 24 hour   Intake 780 ml   Output 1675 ml   Net -895 ml       Physical Exam:   General: NAD  HEENT: NC/AT, PERRL, EOMI  Neck: Supple, trachea midline, no thyromegaly  Cardiac: LVAD hum  Resp: left basilar crackles, tube in place  Abd: Soft, NT/ND  Ext: No edema, no cyanosis, no clubbing  Skin: Warm and dry, no rashes, no lesions, no jaundice  Neuro: AAOx3, CN II-XII grossly intact, no focal deficits, good sensation bilaterally, grossly normal strength and tone.  Psych: Appropriate mood and affect, cooperative & interactive    Personal Review and Summary of Prior Diagnostics    Laboratory Studies:   No results for input(s): "PH", "PCO2", "PO2", "HCO3", "POCSATURATED", "BE" in the last 24 hours.  Recent Labs   Lab 08/06/23  0613   WBC 18.10*   RBC 3.44*   HGB 9.3*   HCT 29.3*      MCV 85   MCH 27.0   MCHC 31.7*     Recent Labs   Lab 08/06/23  0613 08/06/23  1512   * 130*   K 4.3 4.1   CL 94* 92*   CO2 24 25   BUN 24* 24*   CREATININE 1.3 1.3   CALCIUM 9.3 8.8   MG 2.1  --        Microbiology Data:   Microbiology Results (last 7 days)       Procedure Component Value Units Date/Time    Blood culture [[id number]] Collected: 08/05/23 0838    Order Status: Completed Specimen: " Blood Updated: 08/06/23 1012     Blood Culture, Routine No Growth to date      No Growth to date    Blood culture [[id number]] Collected: 08/05/23 0838    Order Status: Completed Specimen: Blood Updated: 08/06/23 1012     Blood Culture, Routine No Growth to date      No Growth to date    Culture, Anaerobic [[id number]] Collected: 08/03/23 1655    Order Status: Completed Specimen: Pleural Fluid Updated: 08/05/23 1441     Anaerobic Culture Culture in progress    AFB Culture & Smear [[id number]] Collected: 08/03/23 1655    Order Status: Completed Specimen: Pleural Fluid Updated: 08/04/23 2127     AFB Culture & Smear Culture in progress     AFB CULTURE STAIN No acid fast bacilli seen.    Blood culture [[id number]] Collected: 07/30/23 0810    Order Status: Completed Specimen: Blood Updated: 08/04/23 1012     Blood Culture, Routine No growth after 5 days.    Blood culture [[id number]] Collected: 07/30/23 0810    Order Status: Completed Specimen: Blood Updated: 08/04/23 1012     Blood Culture, Routine No growth after 5 days.    Aerobic culture [[id number]] Collected: 08/03/23 1655    Order Status: Completed Specimen: Pleural Fluid Updated: 08/04/23 0858     Aerobic Bacterial Culture No growth    Gram stain [[id number]] Collected: 08/03/23 1655    Order Status: Completed Specimen: Pleural Fluid Updated: 08/03/23 2243     Gram Stain Result Rare WBC's      Rare Gram positive cocci    Blood culture [[id number]] Collected: 07/29/23 1130    Order Status: Completed Specimen: Blood Updated: 08/03/23 1412     Blood Culture, Routine No growth after 5 days.    Narrative:      Please obtain 2 sets after RIJ is pulled, thank you    Blood culture [[id number]] Collected: 07/29/23 1132    Order Status: Completed Specimen: Blood Updated: 08/03/23 1412     Blood Culture, Routine No growth after 5 days.    Narrative:      Please obtain 2 sets after RIJ is pulled, thank you  Rt hand    Blood culture [[id number]]  (Abnormal) Collected: 07/28/23 0996     Order Status: Completed Specimen: Blood from Peripheral, Left Hand Updated: 08/01/23 0834     Blood Culture, Routine Gram stain aer bottle: Gram positive cocci in clusters resembling Staph      Positive results previously called 07/30/2023  13:09      STAPHYLOCOCCUS AUREUS  ID consult required at [facility] and [facility] locations.  For susceptibility see order #[id number]      Blood culture [[id number]]  (Abnormal)  (Susceptibility) Collected: 07/28/23 0939    Order Status: Completed Specimen: Blood from Antecubital, Left Arm Updated: 07/31/23 1026     Blood Culture, Routine Gram stain aer bottle: Gram positive cocci in clusters resembling Staph      Positive results previously called 07/29/2023  21:20      STAPHYLOCOCCUS AUREUS  ID consult required at [facility] and [facility].                Impression & Recommendations      38 year-old male with LVAD, recent COVID PNA, MSSA bacteremia with complicated parapneumonic effusion/empyema. No growth to date on pleural fluid cultures, but gram stain showing GPCs c/f MSSA which had been in blood cultures.    Blood cultures appear to have cleared; leukocytosis has improved, pt is clinically improved from respiratory standpoint.   POCUS shows no significant residual effusion after two doses of lytics.   Chest tube output (subtracting the lytics that were instilled) is around 200cc over 24 hours.    Have pulled chest tube today.  Please continue antibiotics per ID for MSSA bacteremia; anticipate 6 week course    Thank you for involving us in the care of this patient. We will sign off; please call anytime if questions arise.    Natty Fleming MD  [employer] PCCM Fellow, PGY5

## 2023-08-06 NOTE — SUBJECTIVE & OBJECTIVE
Interval History: Pulm administered 2nd dose of intrapleural lytics. Pt continued to experience significant chest pain after chest tube manipulation, given PRN norco, which provided relied. No complaints this morning. Blood cultures remain NGTD, remains on IV Ancef.       Continuous Infusions:   milrinone 20mg/100ml D5W (200mcg/ml) 0.25 mcg/kg/min (08/06/23 0350)     Scheduled Meds:   aspirin  81 mg Oral Daily    busPIRone  10 mg Oral BID    ceFAZolin (ANCEF) 8 g in dextrose 5 % (D5W) 500 mL CONTINUOUS INFUSION  8 g Intravenous Q24H    furosemide  80 mg Oral Daily    insulin aspart U-100  20 Units Subcutaneous TIDWM    insulin detemir U-100  24 Units Subcutaneous BID    levETIRAcetam  1,000 mg Oral BID    mirtazapine  15 mg Oral Nightly    polyethylene glycol  17 g Oral Daily    senna-docusate 8.6-50 mg  2 tablet Oral BID    sodium chloride 0.9%  10 mL Intravenous Q6H    valsartan  40 mg Oral BID    warfarin  6 mg Oral Daily     PRN Meds:acetaminophen, dextrose 10%, dextrose 10%, glucagon (human recombinant), glucose, glucose, HYDROcodone-acetaminophen, HYDROmorphone, insulin aspart U-100, Flushing PICC Protocol **AND** sodium chloride 0.9% **AND** sodium chloride 0.9%    Review of patient's allergies indicates:   Allergen Reactions    Bumex [bumetanide] Hives    Torsemide Hives     Objective:     Vital Signs (Most Recent):  Temp: 99.8 °F (37.7 °C) (08/06/23 0446)  Pulse: 89 (08/06/23 0446)  Resp: 18 (08/06/23 0446)  BP: (!) 70/0 (08/06/23 0446)  SpO2: 100 % (08/06/23 0446) Vital Signs (24h Range):  Temp:  [98.9 °F (37.2 °C)-100.7 °F (38.2 °C)] 99.8 °F (37.7 °C)  Pulse:  [] 89  Resp:  [18-22] 18  SpO2:  [96 %-100 %] 100 %  BP: (62-78)/(0) 70/0     Patient Vitals for the past 72 hrs (Last 3 readings):   Weight   08/03/23 0750 90.5 kg (199 lb 8.3 oz)       Body mass index is 24.94 kg/m².      Intake/Output Summary (Last 24 hours) at 8/6/2023 0770  Last data filed at 8/6/2023 0550  Gross per 24 hour   Intake 1202  ml   Output 1625 ml   Net -423 ml         Hemodynamic Parameters:       Telemetry: ST       Physical Exam  Constitutional:       Appearance: Normal appearance.   HENT:      Head: Normocephalic and atraumatic.   Eyes:      Conjunctiva/sclera: Conjunctivae normal.      Pupils: Pupils are equal, round, and reactive to light.   Neck:      Comments: Do not appreciate elevated neck veins  Cardiovascular:      Rate and Rhythm: Regular rhythm. Tachycardia present.      Comments: Smooth VAD hum  Pulmonary:      Effort: Pulmonary effort is normal.      Breath sounds: Normal breath sounds.   Abdominal:      General: Bowel sounds are normal.      Palpations: Abdomen is soft.   Musculoskeletal:         General: No swelling. Normal range of motion.      Cervical back: Normal range of motion and neck supple.   Skin:     General: Skin is warm and dry.      Capillary Refill: Capillary refill takes 2 to 3 seconds.   Neurological:      General: No focal deficit present.      Mental Status: He is alert and oriented to person, place, and time.   Psychiatric:         Mood and Affect: Mood normal.         Behavior: Behavior normal.         Thought Content: Thought content normal.         Judgment: Judgment normal.            Significant Labs:  CBC:  Recent Labs   Lab 08/04/23  0426 08/05/23  0634 08/05/23  0848   WBC 18.63* 19.12* 18.83*   RBC 3.63* 3.66* 3.53*   HGB 10.0* 10.1* 9.6*   HCT 31.0* 30.8* 29.3*    299 279   MCV 85 84 83   MCH 27.5 27.6 27.2   MCHC 32.3 32.8 32.8       BNP:  Recent Labs   Lab 07/31/23  0501 08/02/23  0604 08/04/23  0426   * 82 104*       CMP:  Recent Labs   Lab 08/03/23  0441 08/04/23  0426 08/05/23  0634   * 186* 213*   CALCIUM 9.0 9.3 9.0   ALBUMIN 2.3* 2.3* 2.2*   PROT 7.2 7.4 7.4   * 133* 133*   K 4.1 4.1 4.1   CO2 22* 24 24    100 99   BUN 27* 24* 22*   CREATININE 1.2 1.1 1.0   ALKPHOS 159* 155* 157*   ALT 29 18 15   AST 54* 31 27   BILITOT 0.7 0.8 1.1*        Coagulation:    Recent Labs   Lab 08/03/23  0441 08/04/23  0426 08/05/23  0634   INR 1.5*  1.5* 1.5*  1.5* 1.7*  1.7*   APTT 30.3 33.2* 32.6*       LDH:  Recent Labs   Lab 08/04/23  0426 08/05/23  0634   * 320*       Microbiology:  Microbiology Results (last 7 days)       Procedure Component Value Units Date/Time    Blood culture [[id number]] Collected: 08/05/23 0838    Order Status: Completed Specimen: Blood Updated: 08/05/23 1515     Blood Culture, Routine No Growth to date    Blood culture [[id number]] Collected: 08/05/23 0838    Order Status: Completed Specimen: Blood Updated: 08/05/23 1515     Blood Culture, Routine No Growth to date    Culture, Anaerobic [[id number]] Collected: 08/03/23 1655    Order Status: Completed Specimen: Pleural Fluid Updated: 08/05/23 1441     Anaerobic Culture Culture in progress    AFB Culture & Smear [[id number]] Collected: 08/03/23 1655    Order Status: Completed Specimen: Pleural Fluid Updated: 08/04/23 2127     AFB Culture & Smear Culture in progress     AFB CULTURE STAIN No acid fast bacilli seen.    Blood culture [[id number]] Collected: 07/30/23 0810    Order Status: Completed Specimen: Blood Updated: 08/04/23 1012     Blood Culture, Routine No growth after 5 days.    Blood culture [[id number]] Collected: 07/30/23 0810    Order Status: Completed Specimen: Blood Updated: 08/04/23 1012     Blood Culture, Routine No growth after 5 days.    Aerobic culture [[id number]] Collected: 08/03/23 1655    Order Status: Completed Specimen: Pleural Fluid Updated: 08/04/23 0858     Aerobic Bacterial Culture No growth    Gram stain [[id number]] Collected: 08/03/23 1655    Order Status: Completed Specimen: Pleural Fluid Updated: 08/03/23 2243     Gram Stain Result Rare WBC's      Rare Gram positive cocci    Blood culture [[id number]] Collected: 07/29/23 1130    Order Status: Completed Specimen: Blood Updated: 08/03/23 1412     Blood Culture, Routine No growth after 5 days.    Narrative:      Please obtain 2  sets after RIJ is pulled, thank you    Blood culture [[id number]] Collected: 07/29/23 1132    Order Status: Completed Specimen: Blood Updated: 08/03/23 1412     Blood Culture, Routine No growth after 5 days.    Narrative:      Please obtain 2 sets after RIJ is pulled, thank you  Rt hand    Blood culture [[id number]]  (Abnormal) Collected: 07/28/23 0940    Order Status: Completed Specimen: Blood from Peripheral, Left Hand Updated: 08/01/23 0834     Blood Culture, Routine Gram stain aer bottle: Gram positive cocci in clusters resembling Staph      Positive results previously called 07/30/2023  13:09      STAPHYLOCOCCUS AUREUS  ID consult required at [facility] and [facility].  For susceptibility see order #[id number]      Blood culture [[id number]]  (Abnormal)  (Susceptibility) Collected: 07/28/23 0939    Order Status: Completed Specimen: Blood from Antecubital, Left Arm Updated: 07/31/23 1026     Blood Culture, Routine Gram stain aer bottle: Gram positive cocci in clusters resembling Staph      Positive results previously called 07/29/2023  21:20      STAPHYLOCOCCUS AUREUS  ID consult required at [facility] and [facility] locations.              I have reviewed all pertinent labs within the past 24 hours.    Estimated Creatinine Clearance: 119.7 mL/min (based on SCr of 1 mg/dL).    Diagnostic Results:  I have reviewed and interpreted all pertinent imaging results/findings within the past 24 hours.

## 2023-08-06 NOTE — ASSESSMENT & PLAN NOTE
-Patient presents with elevated fevers, white blood cell count, elevated lactic acid.   -Possible Secondary to COVID-19 infection.  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 Repeated 7/29 with NGTD. Repeated again 7/30 NGTD  -PICC line replaced 8/1  -ECHO done 7/31 and no vegetations appreciated   -CT 8/1 with loculated fluid along Left lateral hear border, thoracic surgery consulted, not a candidate for VATS. Chest tube placed with IR 8/3, pleural fluid cultures positive for GPC  -Continue Ancef 8g IV q 24 hours for total of 6 weeks with end date 9/14 (6 weeks after chest tube placement). ID signed off 8/4  -Low grade fevers overnight 8/5 after receiving 1st dose of intrapleural lytics Repeat blood cultures NGTD

## 2023-08-06 NOTE — PROGRESS NOTES
08/06/2023  Miracle Caballero    Current provider:  Marlo Marques MD    Device interrogation:  TXP LVAD INTERROGATIONS 8/5/2023 8/5/2023 8/5/2023 8/5/2023 8/5/2023 8/4/2023 8/4/2023   Type - HeartMate3 HeartMate3 - HeartMate3 HeartMate3 HeartMate3   Flow 4.7 4.5 4.5 4.4 4.4 4.5 4.6   Speed 5100 5100 5100 5100 5100 5100 5100   PI 2.7 3.6 3.2 3.3 3.4 3.3 3.1   Power (Serna) 3.7 3.7 3.6 3.7 3.7 3.6 3.7   LSL - - - - - - -   Low Flow Alarm - - - - - - -   High Power Alarm - - - - - - -   Pulsatility - - - - - - -          Rounded on Kevan Queen to ensure all mechanical assist device settings (IABP or VAD) were appropriate and all parameters were within limits.  I was able to ensure all back up equipment was present, the staff had no issues, and the Perfusion Department daily rounding was complete.      For implantable VADs: Interrogation of Ventricular assist device was performed with analysis of device parameters and review of device function. I have personally reviewed the interrogation findings and agree with findings as stated.     In emergency, the nursing units have been notified to contact the perfusion department either by:  Calling [id number] from 630am to 4pm Mon thru Fri, utilizing the On-Call Finder functionality of Epic and searching for Perfusion, or by contacting the hospital  from 4pm to 630am and on weekends and asking to speak with the perfusionist on call.    9:13 AM

## 2023-08-06 NOTE — NURSING
Dr. Herndon notified of CVP 12. BMP also done at this time. LVAD dressing completed with daily kit per protocol. Site 2 with scan brown drainage noted on dressing. Patient tolerated procedure. Questions and concerns addressed.

## 2023-08-06 NOTE — PROGRESS NOTES
[facility] - Cardiology Stepdown  Heart Transplant  Progress Note    Patient Name: Kevan Queen  MRN: [id number]  Admission Date: 7/22/2023  Hospital Length of Stay: 15 days  Attending Physician: Marlo Marques MD  Primary Care Provider: Rosio Armendariz FNP  Principal Problem:Sepsis    Subjective:     Interval History: Pulm administered 2nd dose of intrapleural lytics. Pt continued to experience significant chest pain after chest tube manipulation, given PRN norco, which provided relied. No complaints this morning. Blood cultures remain NGTD, remains on IV Ancef.       Continuous Infusions:   milrinone 20mg/100ml D5W (200mcg/ml) 0.25 mcg/kg/min (08/06/23 0350)     Scheduled Meds:   aspirin  81 mg Oral Daily    busPIRone  10 mg Oral BID    ceFAZolin (ANCEF) 8 g in dextrose 5 % (D5W) 500 mL CONTINUOUS INFUSION  8 g Intravenous Q24H    furosemide  80 mg Oral Daily    insulin aspart U-100  20 Units Subcutaneous TIDWM    insulin detemir U-100  24 Units Subcutaneous BID    levETIRAcetam  1,000 mg Oral BID    mirtazapine  15 mg Oral Nightly    polyethylene glycol  17 g Oral Daily    senna-docusate 8.6-50 mg  2 tablet Oral BID    sodium chloride 0.9%  10 mL Intravenous Q6H    valsartan  40 mg Oral BID    warfarin  6 mg Oral Daily     PRN Meds:acetaminophen, dextrose 10%, dextrose 10%, glucagon (human recombinant), glucose, glucose, HYDROcodone-acetaminophen, HYDROmorphone, insulin aspart U-100, Flushing PICC Protocol **AND** sodium chloride 0.9% **AND** sodium chloride 0.9%    Review of patient's allergies indicates:   Allergen Reactions    Bumex [bumetanide] Hives    Torsemide Hives     Objective:     Vital Signs (Most Recent):  Temp: 99.8 °F (37.7 °C) (08/06/23 0446)  Pulse: 89 (08/06/23 0446)  Resp: 18 (08/06/23 0446)  BP: (!) 70/0 (08/06/23 0446)  SpO2: 100 % (08/06/23 0446) Vital Signs (24h Range):  Temp:  [98.9 °F (37.2 °C)-100.7 °F (38.2 °C)] 99.8 °F (37.7 °C)  Pulse:  [] 89  Resp:  [18-22]  18  SpO2:  [96 %-100 %] 100 %  BP: (62-78)/(0) 70/0     Patient Vitals for the past 72 hrs (Last 3 readings):   Weight   08/03/23 0750 90.5 kg (199 lb 8.3 oz)       Body mass index is 24.94 kg/m².      Intake/Output Summary (Last 24 hours) at 8/6/2023 0726  Last data filed at 8/6/2023 0550  Gross per 24 hour   Intake 1202 ml   Output 1625 ml   Net -423 ml         Hemodynamic Parameters:       Telemetry: ST       Physical Exam  Constitutional:       Appearance: Normal appearance.   HENT:      Head: Normocephalic and atraumatic.   Eyes:      Conjunctiva/sclera: Conjunctivae normal.      Pupils: Pupils are equal, round, and reactive to light.   Neck:      Comments: Do not appreciate elevated neck veins  Cardiovascular:      Rate and Rhythm: Regular rhythm. Tachycardia present.      Comments: Smooth VAD hum  Pulmonary:      Effort: Pulmonary effort is normal.      Breath sounds: Normal breath sounds.   Abdominal:      General: Bowel sounds are normal.      Palpations: Abdomen is soft.   Musculoskeletal:         General: No swelling. Normal range of motion.      Cervical back: Normal range of motion and neck supple.   Skin:     General: Skin is warm and dry.      Capillary Refill: Capillary refill takes 2 to 3 seconds.   Neurological:      General: No focal deficit present.      Mental Status: He is alert and oriented to person, place, and time.   Psychiatric:         Mood and Affect: Mood normal.         Behavior: Behavior normal.         Thought Content: Thought content normal.         Judgment: Judgment normal.            Significant Labs:  CBC:  Recent Labs   Lab 08/04/23  0426 08/05/23  0634 08/05/23  0848   WBC 18.63* 19.12* 18.83*   RBC 3.63* 3.66* 3.53*   HGB 10.0* 10.1* 9.6*   HCT 31.0* 30.8* 29.3*    299 279   MCV 85 84 83   MCH 27.5 27.6 27.2   MCHC 32.3 32.8 32.8       BNP:  Recent Labs   Lab 07/31/23  0501 08/02/23  0604 08/04/23  0426   * 82 104*       CMP:  Recent Labs   Lab 08/03/23  0441  08/04/23  0426 08/05/23  0634   * 186* 213*   CALCIUM 9.0 9.3 9.0   ALBUMIN 2.3* 2.3* 2.2*   PROT 7.2 7.4 7.4   * 133* 133*   K 4.1 4.1 4.1   CO2 22* 24 24    100 99   BUN 27* 24* 22*   CREATININE 1.2 1.1 1.0   ALKPHOS 159* 155* 157*   ALT 29 18 15   AST 54* 31 27   BILITOT 0.7 0.8 1.1*        Coagulation:   Recent Labs   Lab 08/03/23  0441 08/04/23  0426 08/05/23  0634   INR 1.5*  1.5* 1.5*  1.5* 1.7*  1.7*   APTT 30.3 33.2* 32.6*       LDH:  Recent Labs   Lab 08/04/23  0426 08/05/23  0634   * 320*       Microbiology:  Microbiology Results (last 7 days)       Procedure Component Value Units Date/Time    Blood culture [[id number]] Collected: 08/05/23 0838    Order Status: Completed Specimen: Blood Updated: 08/05/23 1515     Blood Culture, Routine No Growth to date    Blood culture [[id number]] Collected: 08/05/23 0838    Order Status: Completed Specimen: Blood Updated: 08/05/23 1515     Blood Culture, Routine No Growth to date    Culture, Anaerobic [[id number]] Collected: 08/03/23 1655    Order Status: Completed Specimen: Pleural Fluid Updated: 08/05/23 1441     Anaerobic Culture Culture in progress    AFB Culture & Smear [[id number]] Collected: 08/03/23 1655    Order Status: Completed Specimen: Pleural Fluid Updated: 08/04/23 2127     AFB Culture & Smear Culture in progress     AFB CULTURE STAIN No acid fast bacilli seen.    Blood culture [[id number]] Collected: 07/30/23 0810    Order Status: Completed Specimen: Blood Updated: 08/04/23 1012     Blood Culture, Routine No growth after 5 days.    Blood culture [[id number]] Collected: 07/30/23 0810    Order Status: Completed Specimen: Blood Updated: 08/04/23 1012     Blood Culture, Routine No growth after 5 days.    Aerobic culture [[id number]] Collected: 08/03/23 1655    Order Status: Completed Specimen: Pleural Fluid Updated: 08/04/23 0858     Aerobic Bacterial Culture No growth    Gram stain [[id number]] Collected: 08/03/23 1655    Order Status:  Completed Specimen: Pleural Fluid Updated: 08/03/23 2243     Gram Stain Result Rare WBC's      Rare Gram positive cocci    Blood culture [[id number]] Collected: 07/29/23 1130    Order Status: Completed Specimen: Blood Updated: 08/03/23 1412     Blood Culture, Routine No growth after 5 days.    Narrative:      Please obtain 2 sets after RIJ is pulled, thank you    Blood culture [[id number]] Collected: 07/29/23 1132    Order Status: Completed Specimen: Blood Updated: 08/03/23 1412     Blood Culture, Routine No growth after 5 days.    Narrative:      Please obtain 2 sets after RIJ is pulled, thank you  Rt hand    Blood culture [[id number]]  (Abnormal) Collected: 07/28/23 0940    Order Status: Completed Specimen: Blood from Peripheral, Left Hand Updated: 08/01/23 0834     Blood Culture, Routine Gram stain aer bottle: Gram positive cocci in clusters resembling Staph      Positive results previously called 07/30/2023  13:09      STAPHYLOCOCCUS AUREUS  ID consult required at [facility] and [place] locations.  For susceptibility see order #[id number]      Blood culture [[id number]]  (Abnormal)  (Susceptibility) Collected: 07/28/23 0939    Order Status: Completed Specimen: Blood from Antecubital, Left Arm Updated: 07/31/23 1026     Blood Culture, Routine Gram stain aer bottle: Gram positive cocci in clusters resembling Staph      Positive results previously called 07/29/2023  21:20      STAPHYLOCOCCUS AUREUS  ID consult required at [facility].              I have reviewed all pertinent labs within the past 24 hours.    Estimated Creatinine Clearance: 119.7 mL/min (based on SCr of 1 mg/dL).    Diagnostic Results:  I have reviewed and interpreted all pertinent imaging results/findings within the past 24 hours.    Assessment and Plan:     Patient is a 38 yo black male with stage d HFrEF, NICM, ? Familial CM (Father had LVAD and subsequent heart transplant), h/o PSA, DM2 on insulin who is s/p  DT-HM3 implantation 6/23/2022, post op course complicated by early RV failure requiring RVAD with ProTek Duo  . He underwent RVAD removal and chest closure 6/30/2022.  He was weaned off  but he had to restarted due to RVF. He was eventually transitioned to milrinone (secondary to  shortage) now on 0.25 mcg/kg/min.  Patient also has history of driveline infection.  Patient presented to the emergency room today because of pleuritic chest pain that has been going on for the last 3 days however it was more intense today.  Also complains of having shortness of breath and fevers associated with it.  He was spiking fever of 102 in the emergency room with elevated white blood cell count up to 17,000. He was treated with Zosyn.  He was sent here for admission.  Patient was admitted a month back for COVID infection for which he received remdesivir for 3 days.  He is on doxycycline for chronic driveline infection.  He is on Milrinone for RV failure.  Patient denies having any low flow alarms on the pump.  Also denies having any lower extremity edema, increased discharge from his driveline site.  Patient has ground-glass opacities on imaging from outside hospital      * Sepsis  -Patient presents with elevated fevers, white blood cell count, elevated lactic acid.   -Possible Secondary to COVID-19 infection.  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 Repeated 7/29 with NGTD. Repeated again 7/30 NGTD  -PICC line replaced 8/1  -ECHO done 7/31 and no vegetations appreciated   -CT 8/1 with loculated fluid along Left lateral hear border, thoracic surgery consulted, not a candidate for VATS. Chest tube placed with IR 8/3, pleural fluid cultures positive for GPC  -Continue Ancef 8g IV q 24 hours for total of 6 weeks with end date 9/14 (6 weeks after chest tube placement). 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  Now with new gram + bacteremia  -On Ancef  -Repeat CT of C/A/P 8/1-prior stranding noted around DL site resolved    Bacteremia due to Staphylococcus  -See sepsis    Seizure-like activity  -On on Keppra    LVAD (left ventricular assist device) present  -HeartMate 3 Implanted on 6/29/2022 as DT with RV failure on milrinone at 0.25 mcg/kg/min.  -Continue Coumadin, Goal INR 2.0-3.0. subtherapeutic at 1.5 after coumadin held in setting of INR 3.4. INR 1.9 today   -LDH is stable.  Will continue to monitor daily  -Speed set at 5100, LSL 4700 rpm  -Echo: 7/31/23 EF: 10-15%, LVEDD: 6.87 cm.  No vegation appreciated     Procedure: Device Interrogation Including analysis of device parameters  Current Settings: Ventricular Assist Device  Review of device function is stable    TXP LVAD INTERROGATIONS 8/5/2023 8/5/2023 8/5/2023 8/5/2023 8/5/2023 8/4/2023 8/4/2023   Type - HeartMate3 HeartMate3 - HeartMate3 HeartMate3 HeartMate3   Flow 4.7 4.5 4.5 4.4 4.4 4.5 4.6   Speed 5100 5100 5100 5100 5100 5100 5100   PI 2.7 3.6 3.2 3.3 3.4 3.3 3.1   Power (Serna) 3.7 3.7 3.6 3.7 3.7 3.6 3.7   LSL - - - - - - -   Low Flow Alarm - - - - - - -   High Power Alarm - - - - - - -   Pulsatility - - - - - - -       Type 2 diabetes mellitus with  hyperglycemia  -Will keep him on sliding scale and a.c. HS    Chronic combined systolic and diastolic heart failure  -NICM  -Last 2D Echo 10/31/22: LVEF 10%, LVEDD 7.25 cm  -Euvolemic on examination today, CVP 11 via PICC  -Current diuretic regimen: Lasix 80mg Qdaily  -Status post LVAD  -GDMT: On Valsartan, Aldactone and Toprol at home. Valsartan 40 mg po bid resumed.  -2g Na dietary restriction, 1500 mL fluid restriction, strict I/Os                Denise Espinoza PA-C  Heart Transplant  [facility] - Cardiology Stepdown

## 2023-08-06 NOTE — ASSESSMENT & PLAN NOTE
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  -Continue Coumadin, Goal INR 2.0-3.0. subtherapeutic at 1.5 after coumadin held in setting of INR 3.4. INR 1.9 today   -LDH is stable.  Will continue to monitor daily  -Speed set at 5100, LSL 4700 rpm  -Echo: 7/31/23 EF: 10-15%, LVEDD: 6.87 cm.  No vegation appreciated     Procedure: Device Interrogation Including analysis of device parameters  Current Settings: Ventricular Assist Device  Review of device function is stable    TXP LVAD INTERROGATIONS 8/5/2023 8/5/2023 8/5/2023 8/5/2023 8/5/2023 8/4/2023 8/4/2023   Type - HeartMate3 HeartMate3 - HeartMate3 HeartMate3 HeartMate3   Flow 4.7 4.5 4.5 4.4 4.4 4.5 4.6   Speed 5100 5100 5100 5100 5100 5100 5100   PI 2.7 3.6 3.2 3.3 3.4 3.3 3.1   Power (Serna) 3.7 3.7 3.6 3.7 3.7 3.6 3.7   LSL - - - - - - -   Low Flow Alarm - - - - - - -   High Power Alarm - - - - - - -   Pulsatility - - - - - - -

## 2023-08-06 NOTE — CARE UPDATE
Care Update:     No acute events overnight. Patient on the CSU in room 314/314 A. Blood glucose stable. BG mostly at goal on current insulin regimen (SSI, prandial, and basal insulin ). Steroid use- None.    Renal function- Normal   Vasopressors-  None       Diet diabetic [facility]; 2800 Calorie     POCT Glucose   Date Value Ref Range Status   08/06/2023 198 (H) 70 - 110 mg/dL Final   08/05/2023 119 (H) 70 - 110 mg/dL Final   08/05/2023 104 70 - 110 mg/dL Final   08/05/2023 260 (H) 70 - 110 mg/dL Final   08/05/2023 85 70 - 110 mg/dL Final   08/05/2023 215 (H) 70 - 110 mg/dL Final   08/04/2023 138 (H) 70 - 110 mg/dL Final   08/04/2023 226 (H) 70 - 110 mg/dL Final   08/04/2023 96 70 - 110 mg/dL Final   08/04/2023 231 (H) 70 - 110 mg/dL Final   08/04/2023 255 (H) 70 - 110 mg/dL Final   08/03/2023 162 (H) 70 - 110 mg/dL Final   08/03/2023 117 (H) 70 - 110 mg/dL Final   08/03/2023 183 (H) 70 - 110 mg/dL Final   08/03/2023 170 (H) 70 - 110 mg/dL Final     Lab Results   Component Value Date    HGBA1C 6.7 (H) 07/22/2023       Diabetes Medications               insulin aspart U-100 (NOVOLOG) 100 unit/mL (3 mL) InPn pen Inject 16 Units into the skin 3 (three) times daily with meals. Plus Sliding Scale: 151-200: +1, 201-250: +2, 251-300: +3, 301-350: +4 and call MD; Max Daily Dose: 60 units    insulin detemir U-100, Levemir, 100 unit/mL (3 mL) SubQ InPn pen Inject 22 Units into the skin 2 (two) times daily.          Endocrine  Type 2 diabetes mellitus with hyperglycemia  Endocrinology consulted for BG management.   BG goal 140-180     - Levemir (Insulin Detemir) 24 units BID   - Novolog (Insulin Aspart) 20 units TIDWM and prn for BG excursions MDC SSI (150/25)   - BG checks AC/HS/0200  - Hypoglycemia protocol in place  - If blood glucose greater than 300, please ask patient not to eat food or drink anything other than water until correctional insulin has brought it back below 250    ** Please notify Endocrine for any  change and/or advance in diet**  ** Please call Endocrine for any BG related issues **    Discharge Planning:   TBD. Please notify endocrinology prior to discharge.

## 2023-08-06 NOTE — PLAN OF CARE
Problem: Adult Inpatient Plan of Care  Goal: Plan of Care Review  Outcome: Ongoing, Progressing   Patient free from falls and injuries throughout shift.  AAO and VSS.  Patient denies chest pain and SOB.  Patient continues on milrinone gtt @ 0.25 mcg/kg/min and Lasix PO 80 mg daily. No acute events overnight. Pleural catheter working WNL.  Patient resting well at this time.  Plan of care discussed with patient.  Patient verbalizes understanding.  Will continue to monitor.

## 2023-08-06 NOTE — ASSESSMENT & PLAN NOTE
-NICM  -Last 2D Echo 10/31/22: LVEF 10%, LVEDD 7.25 cm  -Euvolemic on examination today, CVP 11 via PICC  -Current diuretic regimen: Lasix 80mg Qdaily  -Status post LVAD  -GDMT: On Valsartan, Aldactone and Toprol at home. Valsartan 40 mg po bid resumed.  -2g Na dietary restriction, 1500 mL fluid restriction, strict I/Os

## 2023-08-06 NOTE — PROGRESS NOTES
ELIF Silva MD notified patient complaining of 10/10 pain incision site of catheter.  MD aware both pain medications are not within times to administer currently.  Orders to administer PRN Norco early and give now.  Will continue to monitor.

## 2023-08-07 LAB
ALBUMIN SERPL BCP-MCNC: 2.2 G/DL (ref 3.5–5.2)
ALP SERPL-CCNC: 186 U/L (ref 55–135)
ALT SERPL W/O P-5'-P-CCNC: 10 U/L (ref 10–44)
ANION GAP SERPL CALC-SCNC: 12 MMOL/L (ref 8–16)
APTT PPP: 35 SEC (ref 21–32)
AST SERPL-CCNC: 36 U/L (ref 10–40)
BACTERIA SPEC AEROBE CULT: NO GROWTH
BASOPHILS # BLD AUTO: 0.03 K/UL (ref 0–0.2)
BASOPHILS NFR BLD: 0.2 % (ref 0–1.9)
BILIRUB SERPL-MCNC: 1.1 MG/DL (ref 0.1–1)
BNP SERPL-MCNC: 106 PG/ML (ref 0–99)
BUN SERPL-MCNC: 22 MG/DL (ref 6–20)
CALCIUM SERPL-MCNC: 8.9 MG/DL (ref 8.7–10.5)
CHLORIDE SERPL-SCNC: 96 MMOL/L (ref 95–110)
CO2 SERPL-SCNC: 25 MMOL/L (ref 23–29)
CREAT SERPL-MCNC: 1.1 MG/DL (ref 0.5–1.4)
CRP SERPL-MCNC: 129.4 MG/L (ref 0–8.2)
DIFFERENTIAL METHOD: ABNORMAL
EOSINOPHIL # BLD AUTO: 0.1 K/UL (ref 0–0.5)
EOSINOPHIL NFR BLD: 0.9 % (ref 0–8)
ERYTHROCYTE [DISTWIDTH] IN BLOOD BY AUTOMATED COUNT: 20.2 % (ref 11.5–14.5)
EST. GFR  (NO RACE VARIABLE): >60 ML/MIN/1.73 M^2
FINAL PATHOLOGIC DIAGNOSIS: NORMAL
GLUCOSE SERPL-MCNC: 111 MG/DL (ref 70–110)
HCT VFR BLD AUTO: 27.9 % (ref 40–54)
HGB BLD-MCNC: 9.2 G/DL (ref 14–18)
IMM GRANULOCYTES # BLD AUTO: 0.06 K/UL (ref 0–0.04)
IMM GRANULOCYTES NFR BLD AUTO: 0.4 % (ref 0–0.5)
INR PPP: 2.2 (ref 0.8–1.2)
INR PPP: 2.2 (ref 0.8–1.2)
LDH SERPL L TO P-CCNC: 307 U/L (ref 110–260)
LYMPHOCYTES # BLD AUTO: 2.3 K/UL (ref 1–4.8)
LYMPHOCYTES NFR BLD: 14.8 % (ref 18–48)
Lab: NORMAL
MAGNESIUM SERPL-MCNC: 2.1 MG/DL (ref 1.6–2.6)
MCH RBC QN AUTO: 27.7 PG (ref 27–31)
MCHC RBC AUTO-ENTMCNC: 33 G/DL (ref 32–36)
MCV RBC AUTO: 84 FL (ref 82–98)
MONOCYTES # BLD AUTO: 1.2 K/UL (ref 0.3–1)
MONOCYTES NFR BLD: 8 % (ref 4–15)
NEUTROPHILS # BLD AUTO: 11.6 K/UL (ref 1.8–7.7)
NEUTROPHILS NFR BLD: 75.7 % (ref 38–73)
NRBC BLD-RTO: 0 /100 WBC
PHOSPHATE SERPL-MCNC: 3.3 MG/DL (ref 2.7–4.5)
PLATELET # BLD AUTO: 272 K/UL (ref 150–450)
PMV BLD AUTO: 9.3 FL (ref 9.2–12.9)
POCT GLUCOSE: 105 MG/DL (ref 70–110)
POCT GLUCOSE: 204 MG/DL (ref 70–110)
POCT GLUCOSE: 224 MG/DL (ref 70–110)
POCT GLUCOSE: 78 MG/DL (ref 70–110)
POTASSIUM SERPL-SCNC: 4.1 MMOL/L (ref 3.5–5.1)
PREALB SERPL-MCNC: 15 MG/DL (ref 20–43)
PROT SERPL-MCNC: 7.5 G/DL (ref 6–8.4)
PROTHROMBIN TIME: 22.2 SEC (ref 9–12.5)
PROTHROMBIN TIME: 22.2 SEC (ref 9–12.5)
RBC # BLD AUTO: 3.32 M/UL (ref 4.6–6.2)
SODIUM SERPL-SCNC: 133 MMOL/L (ref 136–145)
WBC # BLD AUTO: 15.35 K/UL (ref 3.9–12.7)

## 2023-08-07 PROCEDURE — 85610 PROTHROMBIN TIME: CPT | Performed by: HOSPITALIST

## 2023-08-07 PROCEDURE — 25000003 PHARM REV CODE 250: Performed by: INTERNAL MEDICINE

## 2023-08-07 PROCEDURE — 94761 N-INVAS EAR/PLS OXIMETRY MLT: CPT

## 2023-08-07 PROCEDURE — 63600175 PHARM REV CODE 636 W HCPCS: Performed by: HOSPITALIST

## 2023-08-07 PROCEDURE — A4216 STERILE WATER/SALINE, 10 ML: HCPCS | Performed by: INTERNAL MEDICINE

## 2023-08-07 PROCEDURE — 85025 COMPLETE CBC W/AUTO DIFF WBC: CPT | Performed by: NURSE PRACTITIONER

## 2023-08-07 PROCEDURE — 99233 SBSQ HOSP IP/OBS HIGH 50: CPT | Mod: ,,, | Performed by: PHYSICIAN ASSISTANT

## 2023-08-07 PROCEDURE — 83735 ASSAY OF MAGNESIUM: CPT | Performed by: HOSPITALIST

## 2023-08-07 PROCEDURE — 80053 COMPREHEN METABOLIC PANEL: CPT | Performed by: NURSE PRACTITIONER

## 2023-08-07 PROCEDURE — 85730 THROMBOPLASTIN TIME PARTIAL: CPT | Performed by: STUDENT IN AN ORGANIZED HEALTH CARE EDUCATION/TRAINING PROGRAM

## 2023-08-07 PROCEDURE — 25000003 PHARM REV CODE 250: Performed by: HOSPITALIST

## 2023-08-07 PROCEDURE — 25500020 PHARM REV CODE 255: Performed by: INTERNAL MEDICINE

## 2023-08-07 PROCEDURE — 27000207 HC ISOLATION

## 2023-08-07 PROCEDURE — 25000003 PHARM REV CODE 250: Performed by: PHYSICIAN ASSISTANT

## 2023-08-07 PROCEDURE — 84100 ASSAY OF PHOSPHORUS: CPT | Performed by: HOSPITALIST

## 2023-08-07 PROCEDURE — 20600001 HC STEP DOWN PRIVATE ROOM

## 2023-08-07 PROCEDURE — 25000003 PHARM REV CODE 250: Performed by: NURSE PRACTITIONER

## 2023-08-07 PROCEDURE — 25000003 PHARM REV CODE 250: Performed by: STUDENT IN AN ORGANIZED HEALTH CARE EDUCATION/TRAINING PROGRAM

## 2023-08-07 PROCEDURE — 63600175 PHARM REV CODE 636 W HCPCS: Performed by: PHYSICIAN ASSISTANT

## 2023-08-07 PROCEDURE — 99233 PR SUBSEQUENT HOSPITAL CARE,LEVL III: ICD-10-PCS | Mod: ,,, | Performed by: PHYSICIAN ASSISTANT

## 2023-08-07 PROCEDURE — 93750 INTERROGATION VAD IN PERSON: CPT | Mod: ,,, | Performed by: INTERNAL MEDICINE

## 2023-08-07 PROCEDURE — 83615 LACTATE (LD) (LDH) ENZYME: CPT | Performed by: HOSPITALIST

## 2023-08-07 PROCEDURE — 86140 C-REACTIVE PROTEIN: CPT | Performed by: HOSPITALIST

## 2023-08-07 PROCEDURE — 63600175 PHARM REV CODE 636 W HCPCS: Performed by: INTERNAL MEDICINE

## 2023-08-07 PROCEDURE — 93750 PR INTERROGATE VENT ASSIST DEV, IN PERSON, W PHYSICIAN ANALYSIS: ICD-10-PCS | Mod: ,,, | Performed by: INTERNAL MEDICINE

## 2023-08-07 PROCEDURE — 83880 ASSAY OF NATRIURETIC PEPTIDE: CPT | Performed by: HOSPITALIST

## 2023-08-07 PROCEDURE — 27000248 HC VAD-ADDITIONAL DAY

## 2023-08-07 PROCEDURE — 84134 ASSAY OF PREALBUMIN: CPT | Performed by: HOSPITALIST

## 2023-08-07 RX ORDER — INSULIN ASPART 100 [IU]/ML
17 INJECTION, SOLUTION INTRAVENOUS; SUBCUTANEOUS
Status: DISCONTINUED | OUTPATIENT
Start: 2023-08-07 | End: 2023-08-08

## 2023-08-07 RX ORDER — WARFARIN 3 MG/1
6 TABLET ORAL
Status: DISCONTINUED | OUTPATIENT
Start: 2023-08-09 | End: 2023-08-08

## 2023-08-07 RX ADMIN — VALSARTAN 40 MG: 40 TABLET, FILM COATED ORAL at 08:08

## 2023-08-07 RX ADMIN — LEVETIRACETAM 1000 MG: 500 TABLET, FILM COATED ORAL at 08:08

## 2023-08-07 RX ADMIN — FUROSEMIDE 80 MG: 80 TABLET ORAL at 09:08

## 2023-08-07 RX ADMIN — BUSPIRONE HYDROCHLORIDE 10 MG: 10 TABLET ORAL at 09:08

## 2023-08-07 RX ADMIN — CEFAZOLIN 8 G: 2 INJECTION, POWDER, FOR SOLUTION INTRAMUSCULAR; INTRAVENOUS at 08:08

## 2023-08-07 RX ADMIN — MILRINONE LACTATE 0.25 MCG/KG/MIN: 0.2 INJECTION, SOLUTION INTRAVENOUS at 07:08

## 2023-08-07 RX ADMIN — INSULIN ASPART 6 UNITS: 100 INJECTION, SOLUTION INTRAVENOUS; SUBCUTANEOUS at 05:08

## 2023-08-07 RX ADMIN — Medication 10 ML: at 06:08

## 2023-08-07 RX ADMIN — IOHEXOL 100 ML: 350 INJECTION, SOLUTION INTRAVENOUS at 04:08

## 2023-08-07 RX ADMIN — Medication 10 ML: at 12:08

## 2023-08-07 RX ADMIN — BUSPIRONE HYDROCHLORIDE 10 MG: 10 TABLET ORAL at 08:08

## 2023-08-07 RX ADMIN — INSULIN ASPART 17 UNITS: 100 INJECTION, SOLUTION INTRAVENOUS; SUBCUTANEOUS at 09:08

## 2023-08-07 RX ADMIN — HYDROCODONE BITARTRATE AND ACETAMINOPHEN 1 TABLET: 5; 325 TABLET ORAL at 08:08

## 2023-08-07 RX ADMIN — WARFARIN SODIUM 4.5 MG: 2.5 TABLET ORAL at 05:08

## 2023-08-07 RX ADMIN — POLYETHYLENE GLYCOL 3350 17 G: 17 POWDER, FOR SOLUTION ORAL at 09:08

## 2023-08-07 RX ADMIN — VALSARTAN 40 MG: 40 TABLET, FILM COATED ORAL at 09:08

## 2023-08-07 RX ADMIN — MILRINONE LACTATE 0.25 MCG/KG/MIN: 0.2 INJECTION, SOLUTION INTRAVENOUS at 10:08

## 2023-08-07 RX ADMIN — MIRTAZAPINE 15 MG: 15 TABLET, FILM COATED ORAL at 08:08

## 2023-08-07 RX ADMIN — INSULIN ASPART 6 UNITS: 100 INJECTION, SOLUTION INTRAVENOUS; SUBCUTANEOUS at 09:08

## 2023-08-07 RX ADMIN — HYDROCODONE BITARTRATE AND ACETAMINOPHEN 1 TABLET: 5; 325 TABLET ORAL at 06:08

## 2023-08-07 RX ADMIN — ACETAMINOPHEN 650 MG: 325 TABLET ORAL at 05:08

## 2023-08-07 RX ADMIN — ASPIRIN 81 MG: 81 TABLET, COATED ORAL at 09:08

## 2023-08-07 RX ADMIN — INSULIN DETEMIR 24 UNITS: 100 INJECTION, SOLUTION SUBCUTANEOUS at 08:08

## 2023-08-07 RX ADMIN — LEVETIRACETAM 1000 MG: 500 TABLET, FILM COATED ORAL at 09:08

## 2023-08-07 RX ADMIN — INSULIN ASPART 17 UNITS: 100 INJECTION, SOLUTION INTRAVENOUS; SUBCUTANEOUS at 05:08

## 2023-08-07 RX ADMIN — CEFAZOLIN 8 G: 2 INJECTION, POWDER, FOR SOLUTION INTRAMUSCULAR; INTRAVENOUS at 12:08

## 2023-08-07 RX ADMIN — SENNOSIDES AND DOCUSATE SODIUM 2 TABLET: 50; 8.6 TABLET ORAL at 09:08

## 2023-08-07 RX ADMIN — INSULIN DETEMIR 24 UNITS: 100 INJECTION, SOLUTION SUBCUTANEOUS at 09:08

## 2023-08-07 NOTE — ASSESSMENT & PLAN NOTE
-Patient presents with elevated fevers, white blood cell count, elevated lactic acid.   -Possible Secondary to COVID-19 infection.  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 Repeated 7/29 with NGTD. Repeated again 7/30 NGTD  -PICC line replaced 8/1  -ECHO done 7/31 and no vegetations appreciated   -CT 8/1 with loculated fluid along Left lateral hear border, thoracic surgery consulted, not a candidate for VATS. Chest tube placed with IR 8/3, pleural fluid cultures positive for GPC  -Continue Ancef 8g IV q 24 hours for total of 6 weeks with end date 9/14 (6 weeks after chest tube placement). ID signed off 8/4  -Received 2 doses of intrapleural lytics Repeat blood cultures NGTD

## 2023-08-07 NOTE — PLAN OF CARE
Problem: Adult Inpatient Plan of Care  Goal: Plan of Care Review  Outcome: Ongoing, Progressing       Patient free from falls and injuries throughout shift.  AAO and VSS.  Patient denies chest pain and SOB.  Patient continues on milrinone gtt @ 0.25 mcg/kg/min and Lasix PO 80 mg daily. No acute events overnight. Pleural catheter d/c.  Patient resting well at this time.  Plan of care discussed with patient.  Patient verbalizes understanding.  Will continue to monitor.

## 2023-08-07 NOTE — CARE UPDATE
"RAPID RESPONSE NURSE CHART REVIEW        Chart Reviewed: 08/07/2023, 10:00 AM    MRN: [id number]  Bed: 314/314 A    Dx: Sepsis    Kevan Queen has a past medical history of Acute respiratory failure with hypoxia, Arthritis, Awareness alteration, transient, Cardiomyopathy, CHF (congestive heart failure), COVID-19, Diabetes mellitus, Dilated cardiomyopathy, Drug abuse, Headache, Hyperglycemia, Hyperosmolar hyperglycemic state (HHS), ICD (implantable cardioverter-defibrillator) in place, Left ventricular assist device (LVAD) complication, initial encounter, Muscle cramping, Renal disorder, SOB (shortness of breath), and Tingling in extremities.    Last VS: BP (!) 70/0 (BP Location: Left arm, Patient Position: Sitting)   Pulse (!) 124   Temp 98.6 °F (37 °C) (Temporal)   Resp 16   Ht 6' 3" (1.905 m)   Wt 90.3 kg (199 lb 1.2 oz)   SpO2 98%   BMI 24.88 kg/m²     24H Vital Sign Range:  Temp:  [98.2 °F (36.8 °C)-99.9 °F (37.7 °C)]   Pulse:  [120-135]   Resp:  [16-20]   BP: (60-74)/(0)   SpO2:  [98 %-100 %]     Level of Consciousness (AVPU): alert    Recent Labs     08/05/23  0848 08/06/23  0613 08/07/23  0654   WBC 18.83* 18.10* 15.35*   HGB 9.6* 9.3* 9.2*   HCT 29.3* 29.3* 27.9*    296 272       Recent Labs     08/05/23  0634 08/06/23  0613 08/06/23  1512 08/07/23  0654   * 128* 130* 133*   K 4.1 4.3 4.1 4.1   CL 99 94* 92* 96   CO2 24 24 25 25   BUN 22* 24* 24* 22*   CREATININE 1.0 1.3 1.3 1.1   * 148* 244* 111*   PHOS 3.0 3.2  --  3.3   MG 1.7 2.1  --  2.1        OXYGEN:  Flow (L/min): 2      MEWS score: 3    Rounding completed with charge RIAN Bain.  VAD patient, doppler 70/0, tachycardia HR 120s. No additional concerns verbalized at this time. Instructed to call [phone] for further concerns or assistance.    Jordyn Smith RN        "

## 2023-08-07 NOTE — PROGRESS NOTES
08/07/2023  Enrique NANDO Awan Jr    Current provider:  Marlo Marques MD    Device interrogation:  TXP LVAD INTERROGATIONS 8/7/2023 8/7/2023 8/7/2023 8/6/2023 8/6/2023 8/6/2023 8/6/2023   Type HeartMate3 HeartMate3 HeartMate3 HeartMate3 HeartMate3 HeartMate3 HeartMate3   Flow 4.1 4.3 4.5 4.6 4.6 4.7 4.5   Speed 5100 5100 5100 5100 5100 5100 5100   PI 3.5 3.6 3.6 3.7 3.6 3.7 3.7   Power (Serna) 3.4 3.3 3.5 3.4 3.7 3.3 3.4   LSL 4700 - - - 4700 4700 4700   Low Flow Alarm - - - - - - -   High Power Alarm - - - - - - -   Pulsatility Intermittent pulse - - - Intermittent pulse Pulse Pulse          Rounded on Kevan Queen to ensure all mechanical assist device settings (IABP or VAD) were appropriate and all parameters were within limits.  I was able to ensure all back up equipment was present, the staff had no issues, and the Perfusion Department daily rounding was complete.      For implantable VADs: Interrogation of Ventricular assist device was performed with analysis of device parameters and review of device function. I have personally reviewed the interrogation findings and agree with findings as stated.     In emergency, the nursing units have been notified to contact the perfusion department either by:  Calling [phone] from 630am to 4pm Mon thru Fri, utilizing the On-Call Finder functionality of Epic and searching for Perfusion, or by contacting the hospital  from 4pm to 630am and on weekends and asking to speak with the perfusionist on call.    10:33 AM

## 2023-08-07 NOTE — PLAN OF CARE
AAOX4,VSS,O2 sats>96% on RA. Covid positive, isolation precautions to be active till 8/12/23. Plan of care discussed with patient.Patient has no complaints of chest pain/SOB/palpitations.Pt ambulating independently, fall precautions in place,no falls/injuries through the shift. LVAD DP and numbers WNL, smooth LVAD hum. Discussed medications and care.Infusing Milrinone 0.25 mcg/kg/min and cefazolin at 20.8 mL/hr as per EMAR.Patient has no questions at this time.Pt resting comfortably with no acute distress.Call light within reach,bed at lowest position.       Problem: Adult Inpatient Plan of Care  Goal: Plan of Care Review  Outcome: Ongoing, Progressing  Goal: Patient-Specific Goal (Individualized)  Outcome: Ongoing, Progressing  Goal: Absence of Hospital-Acquired Illness or Injury  Outcome: Ongoing, Progressing  Goal: Optimal Comfort and Wellbeing  Outcome: Ongoing, Progressing  Goal: Readiness for Transition of Care  Outcome: Ongoing, Progressing     Problem: Diabetes Comorbidity  Goal: Blood Glucose Level Within Targeted Range  Outcome: Ongoing, Progressing     Problem: Infection  Goal: Absence of Infection Signs and Symptoms  Outcome: Ongoing, Progressing     Problem: Adjustment to Illness (Sepsis/Septic Shock)  Goal: Optimal Coping  Outcome: Ongoing, Progressing     Problem: Bleeding (Sepsis/Septic Shock)  Goal: Absence of Bleeding  Outcome: Ongoing, Progressing     Problem: Glycemic Control Impaired (Sepsis/Septic Shock)  Goal: Blood Glucose Level Within Desired Range  Outcome: Ongoing, Progressing     Problem: Infection Progression (Sepsis/Septic Shock)  Goal: Absence of Infection Signs and Symptoms  Outcome: Ongoing, Progressing     Problem: Nutrition Impaired (Sepsis/Septic Shock)  Goal: Optimal Nutrition Intake  Outcome: Ongoing, Progressing     Problem: Fluid Imbalance (Pneumonia)  Goal: Fluid Balance  Outcome: Ongoing, Progressing     Problem: Infection (Pneumonia)  Goal: Resolution of Infection Signs and  Symptoms  Outcome: Ongoing, Progressing     Problem: Respiratory Compromise (Pneumonia)  Goal: Effective Oxygenation and Ventilation  Outcome: Ongoing, Progressing     Problem: Fall Injury Risk  Goal: Absence of Fall and Fall-Related Injury  Outcome: Ongoing, Progressing     Problem: Hemodynamic Instability (Ventricular Assist Device)  Goal: Optimal Blood Flow  Outcome: Ongoing, Progressing     Problem: Infection (Ventricular Assist Device)  Goal: Absence of Infection Signs and Symptoms  Outcome: Ongoing, Progressing     Problem: Right-Sided Heart Compromise (Ventricular Assist Device)  Goal: Effective Right-Sided Heart Function  Outcome: Ongoing, Progressing

## 2023-08-07 NOTE — PROGRESS NOTES
[facility] - Cardiology Stepdown  Heart Transplant  Progress Note    Patient Name: Kevan Queen  MRN: [id number]  Admission Date: 7/22/2023   Hospital Length of Stay: 16 days  Attending Physician: Marlo Marques MD  Primary Care Provider: Rosio Armendariz FNP  Principal Problem:Sepsis    Subjective:     Interval History: patient received 2 dose of intrapleural lytics. Chest tube disconnected sometime yesterday am.  Pulm conducted bedside ultrasound that was without obvious residual effusion.  Plan for repeat CT to check for resolution.  Ordered and pending. Blood cultures remain NGTD, remains on IV Ancef.  Will likely discharge tomorrow if CT looks good.       Continuous Infusions:   milrinone 20mg/100ml D5W (200mcg/ml) 0.25 mcg/kg/min (08/07/23 0751)     Scheduled Meds:   aspirin  81 mg Oral Daily    busPIRone  10 mg Oral BID    ceFAZolin (ANCEF) 8 g in dextrose 5 % (D5W) 500 mL CONTINUOUS INFUSION  8 g Intravenous Q24H    furosemide  80 mg Oral Daily    insulin aspart U-100  17 Units Subcutaneous TIDWM    insulin detemir U-100  24 Units Subcutaneous BID    levETIRAcetam  1,000 mg Oral BID    mirtazapine  15 mg Oral Nightly    polyethylene glycol  17 g Oral Daily    senna-docusate 8.6-50 mg  2 tablet Oral BID    sodium chloride 0.9%  10 mL Intravenous Q6H    valsartan  40 mg Oral BID    [START ON 8/9/2023] warfarin  6 mg Oral Once per day on Sun Wed    warfarin  4.5 mg Oral Once per day on Mon Tue Thu Fri Sat     PRN Meds:acetaminophen, dextrose 10%, dextrose 10%, glucagon (human recombinant), glucose, glucose, HYDROcodone-acetaminophen, HYDROmorphone, insulin aspart U-100, Flushing PICC Protocol **AND** sodium chloride 0.9% **AND** sodium chloride 0.9%    Review of patient's allergies indicates:   Allergen Reactions    Bumex [bumetanide] Hives    Torsemide Hives     Objective:     Vital Signs (Most Recent):  Temp: 98.6 °F (37 °C) (08/07/23 0756)  Pulse: (!) 124 (08/07/23 1115)  Resp: 16 (08/07/23  0756)  BP: (!) 70/0 (08/07/23 0756)  SpO2: 98 % (08/07/23 0756) Vital Signs (24h Range):  Temp:  [98.2 °F (36.8 °C)-99.9 °F (37.7 °C)] 98.6 °F (37 °C)  Pulse:  [120-135] 124  Resp:  [16-20] 16  SpO2:  [98 %-100 %] 98 %  BP: (60-74)/(0) 70/0     Patient Vitals for the past 72 hrs (Last 3 readings):   Weight   08/07/23 0756 90.3 kg (199 lb 1.2 oz)       Body mass index is 24.88 kg/m².      Intake/Output Summary (Last 24 hours) at 8/7/2023 1351  Last data filed at 8/7/2023 1215  Gross per 24 hour   Intake 430 ml   Output 1275 ml   Net -845 ml         Hemodynamic Parameters:       Telemetry: ST       Physical Exam  Constitutional:       Appearance: Normal appearance.   HENT:      Head: Normocephalic and atraumatic.   Eyes:      Conjunctiva/sclera: Conjunctivae normal.      Pupils: Pupils are equal, round, and reactive to light.   Neck:      Comments: Do not appreciate elevated neck veins  Cardiovascular:      Rate and Rhythm: Regular rhythm. Tachycardia present.      Comments: Smooth VAD hum  Pulmonary:      Effort: Pulmonary effort is normal.      Breath sounds: Normal breath sounds.   Abdominal:      General: Bowel sounds are normal.      Palpations: Abdomen is soft.   Musculoskeletal:         General: No swelling. Normal range of motion.      Cervical back: Normal range of motion and neck supple.   Skin:     General: Skin is warm and dry.      Capillary Refill: Capillary refill takes 2 to 3 seconds.   Neurological:      General: No focal deficit present.      Mental Status: He is alert and oriented to person, place, and time.   Psychiatric:         Mood and Affect: Mood normal.         Behavior: Behavior normal.         Thought Content: Thought content normal.         Judgment: Judgment normal.            Significant Labs:  CBC:  Recent Labs   Lab 08/05/23  0848 08/06/23  0613 08/07/23  0654   WBC 18.83* 18.10* 15.35*   RBC 3.53* 3.44* 3.32*   HGB 9.6* 9.3* 9.2*   HCT 29.3* 29.3* 27.9*    296 272   MCV 83 85  84   MCH 27.2 27.0 27.7   MCHC 32.8 31.7* 33.0       BNP:  Recent Labs   Lab 08/02/23  0604 08/04/23  0426 08/07/23  0654   BNP 82 104* 106*       CMP:  Recent Labs   Lab 08/05/23  0634 08/06/23  0613 08/06/23  1512 08/07/23  0654   * 148* 244* 111*   CALCIUM 9.0 9.3 8.8 8.9   ALBUMIN 2.2* 2.3*  --  2.2*   PROT 7.4 7.6  --  7.5   * 128* 130* 133*   K 4.1 4.3 4.1 4.1   CO2 24 24 25 25   CL 99 94* 92* 96   BUN 22* 24* 24* 22*   CREATININE 1.0 1.3 1.3 1.1   ALKPHOS 157* 193*  --  186*   ALT 15 17  --  10   AST 27 43*  --  36   BILITOT 1.1* 1.3*  --  1.1*        Coagulation:   Recent Labs   Lab 08/05/23  0634 08/06/23  0613 08/07/23  0654   INR 1.7*  1.7* 1.9*  1.9* 2.2*  2.2*   APTT 32.6* 34.9* 35.0*       LDH:  Recent Labs   Lab 08/05/23  0634 08/06/23  0613 08/07/23  0654   * 407* 307*       Microbiology:  Microbiology Results (last 7 days)       Procedure Component Value Units Date/Time    Blood culture [[id number]] Collected: 08/05/23 0838    Order Status: Completed Specimen: Blood Updated: 08/07/23 1012     Blood Culture, Routine No Growth to date      No Growth to date      No Growth to date    Blood culture [[id number]] Collected: 08/05/23 0838    Order Status: Completed Specimen: Blood Updated: 08/07/23 1012     Blood Culture, Routine No Growth to date      No Growth to date      No Growth to date    Culture, Anaerobic [[id number]] Collected: 08/03/23 1655    Order Status: Completed Specimen: Pleural Fluid Updated: 08/07/23 0900     Anaerobic Culture Culture in progress    Aerobic culture [[id number]] Collected: 08/03/23 1655    Order Status: Completed Specimen: Pleural Fluid Updated: 08/07/23 0843     Aerobic Bacterial Culture No growth    AFB Culture & Smear [[id number]] Collected: 08/03/23 1655    Order Status: Completed Specimen: Pleural Fluid Updated: 08/04/23 2127     AFB Culture & Smear Culture in progress     AFB CULTURE STAIN No acid fast bacilli seen.    Blood culture [[id number]]  Collected: 07/30/23 0810    Order Status: Completed Specimen: Blood Updated: 08/04/23 1012     Blood Culture, Routine No growth after 5 days.    Blood culture [[id number]] Collected: 07/30/23 0810    Order Status: Completed Specimen: Blood Updated: 08/04/23 1012     Blood Culture, Routine No growth after 5 days.    Gram stain [[id number]] Collected: 08/03/23 1655    Order Status: Completed Specimen: Pleural Fluid Updated: 08/03/23 2243     Gram Stain Result Rare WBC's      Rare Gram positive cocci    Blood culture [[id number]] Collected: 07/29/23 1130    Order Status: Completed Specimen: Blood Updated: 08/03/23 1412     Blood Culture, Routine No growth after 5 days.    Narrative:      Please obtain 2 sets after RIJ is pulled, thank you    Blood culture [[id number]] Collected: 07/29/23 1132    Order Status: Completed Specimen: Blood Updated: 08/03/23 1412     Blood Culture, Routine No growth after 5 days.    Narrative:      Please obtain 2 sets after RIJ is pulled, thank you  Rt hand    Blood culture [[id number]]  (Abnormal) Collected: 07/28/23 0940    Order Status: Completed Specimen: Blood from Peripheral, Left Hand Updated: 08/01/23 0834     Blood Culture, Routine Gram stain aer bottle: Gram positive cocci in clusters resembling Staph      Positive results previously called 07/30/2023  13:09      STAPHYLOCOCCUS AUREUS  ID consult required at [facility] and [facility] locations.  For susceptibility see order #[id number]              I have reviewed all pertinent labs within the past 24 hours.    Estimated Creatinine Clearance: 108.8 mL/min (based on SCr of 1.1 mg/dL).    Diagnostic Results:  I have reviewed and interpreted all pertinent imaging results/findings within the past 24 hours.    Assessment and Plan:     Patient is a 36 yo black male with stage d HFrEF, NICM, ? Familial CM (Father had LVAD and subsequent heart transplant), h/o PSA, DM2 on insulin who is s/p DT-HM3 implantation 6/23/2022, post op course  complicated by early RV failure requiring RVAD with ProTek Duo  . He underwent RVAD removal and chest closure 6/30/2022.  He was weaned off  but he had to restarted due to RVF. He was eventually transitioned to milrinone (secondary to  shortage) now on 0.25 mcg/kg/min.  Patient also has history of driveline infection.  Patient presented to the emergency room today because of pleuritic chest pain that has been going on for the last 3 days however it was more intense today.  Also complains of having shortness of breath and fevers associated with it.  He was spiking fever of 102 in the emergency room with elevated white blood cell count up to 17,000. He was treated with Zosyn.  He was sent here for admission.  Patient was admitted a month back for COVID infection for which he received remdesivir for 3 days.  He is on doxycycline for chronic driveline infection.  He is on Milrinone for RV failure.  Patient denies having any low flow alarms on the pump.  Also denies having any lower extremity edema, increased discharge from his driveline site.  Patient has ground-glass opacities on imaging from outside hospital      * Sepsis  -Patient presents with elevated fevers, white blood cell count, elevated lactic acid.   -Possible Secondary to COVID-19 infection.  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 Repeated 7/29 with NGTD. Repeated again 7/30 NGTD  -PICC line replaced 8/1  -ECHO done 7/31 and no vegetations appreciated   -CT 8/1 with loculated fluid along Left lateral hear border, thoracic surgery consulted, not a candidate for VATS. Chest tube placed with IR 8/3, pleural fluid cultures positive for GPC  -Continue Ancef 8g IV q 24 hours for total of 6 weeks with end date 9/14 (6 weeks after chest tube placement). ID signed off 8/4  -Received 2 doses of intrapleural lytics Repeat blood cultures  NGTD      Bacteremia due to Staphylococcus  -See sepsis    Pleural effusion  -CT imaging this admission consistent with small left sided pleural effusion and complicated pleural around left heart border  -Pulm consulted- suspected para-pneumonic process vs empyema in setting of MSSA bacteremia  -Thoracic surgery consulted for VATS and patient not candidate  -IR consulted for CT guided chest tube placed in posterior collection 8/3  -pulm following and plan to administer intrapleural lytics x 3 days (started 8/4)  -Gram stain from pleural fluid + GPC.  Patient on Cefazolin    LVAD (left ventricular assist device) present  -HeartMate 3 Implanted on 6/29/2022 as DT with RV failure on milrinone at 0.25 mcg/kg/min.  -Continue Coumadin, Goal INR 2.0-3.0. subtherapeutic at 1.5 after coumadin held in setting of INR 3.4. INR 2.2 today   -LDH is stable.  Will continue to monitor daily  -Speed set at 5100, LSL 4700 rpm  -Echo: 7/31/23 EF: 10-15%, LVEDD: 6.87 cm.  No vegation appreciated     Procedure: Device Interrogation Including analysis of device parameters  Current Settings: Ventricular Assist Device  Review of device function is stable    TXP LVAD INTERROGATIONS 8/7/2023 8/7/2023 8/7/2023 8/6/2023 8/6/2023 8/6/2023 8/6/2023   Type HeartMate3 HeartMate3 HeartMate3 HeartMate3 HeartMate3 HeartMate3 HeartMate3   Flow 4.1 4.3 4.5 4.6 4.6 4.7 4.5   Speed 5100 5100 5100 5100 5100 5100 5100   PI 3.5 3.6 3.6 3.7 3.6 3.7 3.7   Power (Serna) 3.4 3.3 3.5 3.4 3.7 3.3 3.4   LSL 4700 - - - 4700 4700 4700   Low Flow Alarm - - - - - - -   High Power Alarm - - - - - - -   Pulsatility Intermittent pulse - - - Intermittent pulse Pulse Pulse       Chronic combined systolic and diastolic heart failure  -NICM  -Last 2D Echo 10/31/22: LVEF 10%, LVEDD 7.25 cm  -Euvolemic on examination today, CVP 11 via PICC  -Current diuretic regimen: Lasix 80mg Qdaily  -Status post LVAD  -GDMT: On Valsartan, Aldactone and Toprol at home. Valsartan 40 mg po bid  resumed.  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  Now with new gram + bacteremia  -On Ancef  -Repeat CT of C/A/P 8/1-prior stranding noted around DL site resolved    Type 2 diabetes mellitus with hyperglycemia  -Will keep him on sliding scale and a.c. HS    Seizure-like activity  -On on DEYA Irizarry  Heart Transplant  [facility] - Cardiology Stepdown

## 2023-08-07 NOTE — NURSING
"LVAD dressing change completed using sterile technique with daily kit. DLES is a "2" with minimal drainage noted on the drain sponge. Tolerated without any complication. no redness, or tenderness noted.    "

## 2023-08-07 NOTE — ASSESSMENT & PLAN NOTE
-CT imaging this admission consistent with small left sided pleural effusion and complicated pleural around left heart border  -Pulm consulted- suspected para-pneumonic process vs empyema in setting of MSSA bacteremia  -Thoracic surgery consulted for VATS and patient not candidate  -IR consulted for CT guided chest tube placed in posterior collection 8/3  -pulm following and plan to administer intrapleural lytics x 3 days (started 8/4)  -Gram stain from pleural fluid + GPC.  Patient on Cefazolin

## 2023-08-07 NOTE — ASSESSMENT & PLAN NOTE
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  -Continue Coumadin, Goal INR 2.0-3.0. subtherapeutic at 1.5 after coumadin held in setting of INR 3.4. INR 2.2 today   -LDH is stable.  Will continue to monitor daily  -Speed set at 5100, LSL 4700 rpm  -Echo: 7/31/23 EF: 10-15%, LVEDD: 6.87 cm.  No vegation appreciated     Procedure: Device Interrogation Including analysis of device parameters  Current Settings: Ventricular Assist Device  Review of device function is stable    TXP LVAD INTERROGATIONS 8/7/2023 8/7/2023 8/7/2023 8/6/2023 8/6/2023 8/6/2023 8/6/2023   Type HeartMate3 HeartMate3 HeartMate3 HeartMate3 HeartMate3 HeartMate3 HeartMate3   Flow 4.1 4.3 4.5 4.6 4.6 4.7 4.5   Speed 5100 5100 5100 5100 5100 5100 5100   PI 3.5 3.6 3.6 3.7 3.6 3.7 3.7   Power (Serna) 3.4 3.3 3.5 3.4 3.7 3.3 3.4   LSL 4700 - - - 4700 4700 4700   Low Flow Alarm - - - - - - -   High Power Alarm - - - - - - -   Pulsatility Intermittent pulse - - - Intermittent pulse Pulse Pulse

## 2023-08-07 NOTE — CARE UPDATE
Care Update:     No acute events overnight. Patient on the CSU in room 314/314 A. Blood glucose stable. BG mostly at goal on current insulin regimen (SSI, prandial, and basal insulin ). Steroid use- None.    Renal function- Normal   Vasopressors-  None       Diet diabetic [facility]; 2800 Calorie     POCT Glucose   Date Value Ref Range Status   08/06/2023 201 (H) 70 - 110 mg/dL Final   08/06/2023 181 (H) 70 - 110 mg/dL Final   08/06/2023 70 70 - 110 mg/dL Final   08/06/2023 198 (H) 70 - 110 mg/dL Final   08/05/2023 119 (H) 70 - 110 mg/dL Final   08/05/2023 104 70 - 110 mg/dL Final   08/05/2023 260 (H) 70 - 110 mg/dL Final   08/05/2023 85 70 - 110 mg/dL Final   08/05/2023 215 (H) 70 - 110 mg/dL Final   08/04/2023 138 (H) 70 - 110 mg/dL Final   08/04/2023 226 (H) 70 - 110 mg/dL Final   08/04/2023 96 70 - 110 mg/dL Final   08/04/2023 231 (H) 70 - 110 mg/dL Final     Lab Results   Component Value Date    HGBA1C 6.7 (H) 07/22/2023       Diabetes Medications               insulin aspart U-100 (NOVOLOG) 100 unit/mL (3 mL) InPn pen Inject 16 Units into the skin 3 (three) times daily with meals. Plus Sliding Scale: 151-200: +1, 201-250: +2, 251-300: +3, 301-350: +4 and call MD; Max Daily Dose: 60 units    insulin detemir U-100, Levemir, 100 unit/mL (3 mL) SubQ InPn pen Inject 22 Units into the skin 2 (two) times daily.          Endocrine  Type 2 diabetes mellitus with hyperglycemia  Endocrinology consulted for BG management.   BG goal 140-180  BG below goal at times post prandial.  Will decrease mealtime insulin.     - Levemir (Insulin Detemir) 24 units BID   - Novolog (Insulin Aspart) 17 units TIDWM and prn for BG excursions MDC SSI (150/25)   - BG checks AC/HS/0200  - Hypoglycemia protocol in place  - If blood glucose greater than 300, please ask patient not to eat food or drink anything other than water until correctional insulin has brought it back below 250    ** Please notify Endocrine for any change and/or  advance in diet**  ** Please call Endocrine for any BG related issues **    Discharge Planning:   TBD. Please notify endocrinology prior to discharge.

## 2023-08-07 NOTE — SUBJECTIVE & OBJECTIVE
Interval History: patient received 2 dose of intrapleural lytics. Chest tube disconnected sometime yesterday am.  Pulm conducted bedside ultrasound that was without obvious residual effusion.  Plan for repeat CT to check for resolution.  Ordered and pending. Blood cultures remain NGTD, remains on IV Ancef.  Will likely discharge tomorrow if CT looks good.       Continuous Infusions:   milrinone 20mg/100ml D5W (200mcg/ml) 0.25 mcg/kg/min (08/07/23 0751)     Scheduled Meds:   aspirin  81 mg Oral Daily    busPIRone  10 mg Oral BID    ceFAZolin (ANCEF) 8 g in dextrose 5 % (D5W) 500 mL CONTINUOUS INFUSION  8 g Intravenous Q24H    furosemide  80 mg Oral Daily    insulin aspart U-100  17 Units Subcutaneous TIDWM    insulin detemir U-100  24 Units Subcutaneous BID    levETIRAcetam  1,000 mg Oral BID    mirtazapine  15 mg Oral Nightly    polyethylene glycol  17 g Oral Daily    senna-docusate 8.6-50 mg  2 tablet Oral BID    sodium chloride 0.9%  10 mL Intravenous Q6H    valsartan  40 mg Oral BID    [START ON 8/9/2023] warfarin  6 mg Oral Once per day on Sun Wed    warfarin  4.5 mg Oral Once per day on Mon Tue Thu Fri Sat     PRN Meds:acetaminophen, dextrose 10%, dextrose 10%, glucagon (human recombinant), glucose, glucose, HYDROcodone-acetaminophen, HYDROmorphone, insulin aspart U-100, Flushing PICC Protocol **AND** sodium chloride 0.9% **AND** sodium chloride 0.9%    Review of patient's allergies indicates:   Allergen Reactions    Bumex [bumetanide] Hives    Torsemide Hives     Objective:     Vital Signs (Most Recent):  Temp: 98.6 °F (37 °C) (08/07/23 0756)  Pulse: (!) 124 (08/07/23 1115)  Resp: 16 (08/07/23 0756)  BP: (!) 70/0 (08/07/23 0756)  SpO2: 98 % (08/07/23 0756) Vital Signs (24h Range):  Temp:  [98.2 °F (36.8 °C)-99.9 °F (37.7 °C)] 98.6 °F (37 °C)  Pulse:  [120-135] 124  Resp:  [16-20] 16  SpO2:  [98 %-100 %] 98 %  BP: (60-74)/(0) 70/0     Patient Vitals for the past 72 hrs (Last 3 readings):   Weight   08/07/23  0756 90.3 kg (199 lb 1.2 oz)       Body mass index is 24.88 kg/m².      Intake/Output Summary (Last 24 hours) at 8/7/2023 1351  Last data filed at 8/7/2023 1215  Gross per 24 hour   Intake 430 ml   Output 1275 ml   Net -845 ml         Hemodynamic Parameters:       Telemetry: ST       Physical Exam  Constitutional:       Appearance: Normal appearance.   HENT:      Head: Normocephalic and atraumatic.   Eyes:      Conjunctiva/sclera: Conjunctivae normal.      Pupils: Pupils are equal, round, and reactive to light.   Neck:      Comments: Do not appreciate elevated neck veins  Cardiovascular:      Rate and Rhythm: Regular rhythm. Tachycardia present.      Comments: Smooth VAD hum  Pulmonary:      Effort: Pulmonary effort is normal.      Breath sounds: Normal breath sounds.   Abdominal:      General: Bowel sounds are normal.      Palpations: Abdomen is soft.   Musculoskeletal:         General: No swelling. Normal range of motion.      Cervical back: Normal range of motion and neck supple.   Skin:     General: Skin is warm and dry.      Capillary Refill: Capillary refill takes 2 to 3 seconds.   Neurological:      General: No focal deficit present.      Mental Status: He is alert and oriented to person, place, and time.   Psychiatric:         Mood and Affect: Mood normal.         Behavior: Behavior normal.         Thought Content: Thought content normal.         Judgment: Judgment normal.            Significant Labs:  CBC:  Recent Labs   Lab 08/05/23  0848 08/06/23  0613 08/07/23  0654   WBC 18.83* 18.10* 15.35*   RBC 3.53* 3.44* 3.32*   HGB 9.6* 9.3* 9.2*   HCT 29.3* 29.3* 27.9*    296 272   MCV 83 85 84   MCH 27.2 27.0 27.7   MCHC 32.8 31.7* 33.0       BNP:  Recent Labs   Lab 08/02/23  0604 08/04/23  0426 08/07/23  0654   BNP 82 104* 106*       CMP:  Recent Labs   Lab 08/05/23  0634 08/06/23  0613 08/06/23  1512 08/07/23  0654   * 148* 244* 111*   CALCIUM 9.0 9.3 8.8 8.9   ALBUMIN 2.2* 2.3*  --  2.2*   PROT  7.4 7.6  --  7.5   * 128* 130* 133*   K 4.1 4.3 4.1 4.1   CO2 24 24 25 25   CL 99 94* 92* 96   BUN 22* 24* 24* 22*   CREATININE 1.0 1.3 1.3 1.1   ALKPHOS 157* 193*  --  186*   ALT 15 17  --  10   AST 27 43*  --  36   BILITOT 1.1* 1.3*  --  1.1*        Coagulation:   Recent Labs   Lab 08/05/23  0634 08/06/23  0613 08/07/23  0654   INR 1.7*  1.7* 1.9*  1.9* 2.2*  2.2*   APTT 32.6* 34.9* 35.0*       LDH:  Recent Labs   Lab 08/05/23  0634 08/06/23  0613 08/07/23  0654   * 407* 307*       Microbiology:  Microbiology Results (last 7 days)       Procedure Component Value Units Date/Time    Blood culture [[id number]] Collected: 08/05/23 0838    Order Status: Completed Specimen: Blood Updated: 08/07/23 1012     Blood Culture, Routine No Growth to date      No Growth to date      No Growth to date    Blood culture [[id number]] Collected: 08/05/23 0838    Order Status: Completed Specimen: Blood Updated: 08/07/23 1012     Blood Culture, Routine No Growth to date      No Growth to date      No Growth to date    Culture, Anaerobic [[id number]] Collected: 08/03/23 1655    Order Status: Completed Specimen: Pleural Fluid Updated: 08/07/23 0900     Anaerobic Culture Culture in progress    Aerobic culture [[id number]] Collected: 08/03/23 1655    Order Status: Completed Specimen: Pleural Fluid Updated: 08/07/23 0843     Aerobic Bacterial Culture No growth    AFB Culture & Smear [[id number]] Collected: 08/03/23 1655    Order Status: Completed Specimen: Pleural Fluid Updated: 08/04/23 2127     AFB Culture & Smear Culture in progress     AFB CULTURE STAIN No acid fast bacilli seen.    Blood culture [[id number]] Collected: 07/30/23 0810    Order Status: Completed Specimen: Blood Updated: 08/04/23 1012     Blood Culture, Routine No growth after 5 days.    Blood culture [[id number]] Collected: 07/30/23 0810    Order Status: Completed Specimen: Blood Updated: 08/04/23 1012     Blood Culture, Routine No growth after 5 days.    Gram  stain [[id number]] Collected: 08/03/23 1655    Order Status: Completed Specimen: Pleural Fluid Updated: 08/03/23 2243     Gram Stain Result Rare WBC's      Rare Gram positive cocci    Blood culture [[id number]] Collected: 07/29/23 1130    Order Status: Completed Specimen: Blood Updated: 08/03/23 1412     Blood Culture, Routine No growth after 5 days.    Narrative:      Please obtain 2 sets after RIJ is pulled, thank you    Blood culture [[id number]] Collected: 07/29/23 1132    Order Status: Completed Specimen: Blood Updated: 08/03/23 1412     Blood Culture, Routine No growth after 5 days.    Narrative:      Please obtain 2 sets after RIJ is pulled, thank you  Rt hand    Blood culture [[id number]]  (Abnormal) Collected: 07/28/23 0940    Order Status: Completed Specimen: Blood from Peripheral, Left Hand Updated: 08/01/23 0834     Blood Culture, Routine Gram stain aer bottle: Gram positive cocci in clusters resembling Staph      Positive results previously called 07/30/2023  13:09      STAPHYLOCOCCUS AUREUS  ID consult required at [facility]  For susceptibility see order #[id number]              I have reviewed all pertinent labs within the past 24 hours.    Estimated Creatinine Clearance: 108.8 mL/min (based on SCr of 1.1 mg/dL).    Diagnostic Results:  I have reviewed and interpreted all pertinent imaging results/findings within the past 24 hours.

## 2023-08-08 VITALS
HEIGHT: 75 IN | SYSTOLIC BLOOD PRESSURE: 74 MMHG | HEART RATE: 115 BPM | OXYGEN SATURATION: 97 % | BODY MASS INDEX: 24.75 KG/M2 | TEMPERATURE: 98 F | WEIGHT: 199.06 LBS | RESPIRATION RATE: 16 BRPM

## 2023-08-08 LAB
ALBUMIN SERPL BCP-MCNC: 2 G/DL (ref 3.5–5.2)
ALP SERPL-CCNC: 196 U/L (ref 55–135)
ALT SERPL W/O P-5'-P-CCNC: 15 U/L (ref 10–44)
ANION GAP SERPL CALC-SCNC: 7 MMOL/L (ref 8–16)
APTT PPP: 33.6 SEC (ref 21–32)
AST SERPL-CCNC: 44 U/L (ref 10–40)
BASOPHILS # BLD AUTO: 0.02 K/UL (ref 0–0.2)
BASOPHILS NFR BLD: 0.2 % (ref 0–1.9)
BILIRUB SERPL-MCNC: 0.5 MG/DL (ref 0.1–1)
BUN SERPL-MCNC: 22 MG/DL (ref 6–20)
CALCIUM SERPL-MCNC: 8.9 MG/DL (ref 8.7–10.5)
CHLORIDE SERPL-SCNC: 100 MMOL/L (ref 95–110)
CO2 SERPL-SCNC: 28 MMOL/L (ref 23–29)
CREAT SERPL-MCNC: 1 MG/DL (ref 0.5–1.4)
DIFFERENTIAL METHOD: ABNORMAL
EOSINOPHIL # BLD AUTO: 0.1 K/UL (ref 0–0.5)
EOSINOPHIL NFR BLD: 1.2 % (ref 0–8)
ERYTHROCYTE [DISTWIDTH] IN BLOOD BY AUTOMATED COUNT: 20.2 % (ref 11.5–14.5)
EST. GFR  (NO RACE VARIABLE): >60 ML/MIN/1.73 M^2
GLUCOSE SERPL-MCNC: 127 MG/DL (ref 70–110)
HCT VFR BLD AUTO: 26.6 % (ref 40–54)
HGB BLD-MCNC: 8.7 G/DL (ref 14–18)
IMM GRANULOCYTES # BLD AUTO: 0.04 K/UL (ref 0–0.04)
IMM GRANULOCYTES NFR BLD AUTO: 0.4 % (ref 0–0.5)
INR PPP: 2 (ref 0.8–1.2)
INR PPP: 2 (ref 0.8–1.2)
LDH SERPL L TO P-CCNC: 284 U/L (ref 110–260)
LYMPHOCYTES # BLD AUTO: 2.4 K/UL (ref 1–4.8)
LYMPHOCYTES NFR BLD: 23.4 % (ref 18–48)
MAGNESIUM SERPL-MCNC: 2.3 MG/DL (ref 1.6–2.6)
MCH RBC QN AUTO: 27.4 PG (ref 27–31)
MCHC RBC AUTO-ENTMCNC: 32.7 G/DL (ref 32–36)
MCV RBC AUTO: 84 FL (ref 82–98)
MONOCYTES # BLD AUTO: 0.7 K/UL (ref 0.3–1)
MONOCYTES NFR BLD: 6.7 % (ref 4–15)
NEUTROPHILS # BLD AUTO: 6.9 K/UL (ref 1.8–7.7)
NEUTROPHILS NFR BLD: 68.1 % (ref 38–73)
NRBC BLD-RTO: 0 /100 WBC
PHOSPHATE SERPL-MCNC: 3.7 MG/DL (ref 2.7–4.5)
PLATELET # BLD AUTO: 246 K/UL (ref 150–450)
PMV BLD AUTO: 8.9 FL (ref 9.2–12.9)
POCT GLUCOSE: 160 MG/DL (ref 70–110)
POCT GLUCOSE: 164 MG/DL (ref 70–110)
POCT GLUCOSE: 180 MG/DL (ref 70–110)
POCT GLUCOSE: 96 MG/DL (ref 70–110)
POTASSIUM SERPL-SCNC: 4.1 MMOL/L (ref 3.5–5.1)
PROT SERPL-MCNC: 7.2 G/DL (ref 6–8.4)
PROTHROMBIN TIME: 20.4 SEC (ref 9–12.5)
PROTHROMBIN TIME: 20.4 SEC (ref 9–12.5)
RBC # BLD AUTO: 3.17 M/UL (ref 4.6–6.2)
SODIUM SERPL-SCNC: 135 MMOL/L (ref 136–145)
WBC # BLD AUTO: 10.05 K/UL (ref 3.9–12.7)

## 2023-08-08 PROCEDURE — 99222 PR INITIAL HOSPITAL CARE,LEVL II: ICD-10-PCS | Mod: ,,, | Performed by: STUDENT IN AN ORGANIZED HEALTH CARE EDUCATION/TRAINING PROGRAM

## 2023-08-08 PROCEDURE — 99233 SBSQ HOSP IP/OBS HIGH 50: CPT | Mod: ,,, | Performed by: PHYSICIAN ASSISTANT

## 2023-08-08 PROCEDURE — 25000003 PHARM REV CODE 250: Performed by: HOSPITALIST

## 2023-08-08 PROCEDURE — 83735 ASSAY OF MAGNESIUM: CPT | Performed by: HOSPITALIST

## 2023-08-08 PROCEDURE — 84100 ASSAY OF PHOSPHORUS: CPT | Performed by: HOSPITALIST

## 2023-08-08 PROCEDURE — 83615 LACTATE (LD) (LDH) ENZYME: CPT | Performed by: HOSPITALIST

## 2023-08-08 PROCEDURE — 99233 PR SUBSEQUENT HOSPITAL CARE,LEVL III: ICD-10-PCS | Mod: ,,, | Performed by: PHYSICIAN ASSISTANT

## 2023-08-08 PROCEDURE — 25000003 PHARM REV CODE 250: Performed by: STUDENT IN AN ORGANIZED HEALTH CARE EDUCATION/TRAINING PROGRAM

## 2023-08-08 PROCEDURE — 27000248 HC VAD-ADDITIONAL DAY

## 2023-08-08 PROCEDURE — 94761 N-INVAS EAR/PLS OXIMETRY MLT: CPT

## 2023-08-08 PROCEDURE — 25000003 PHARM REV CODE 250: Performed by: NURSE PRACTITIONER

## 2023-08-08 PROCEDURE — 85025 COMPLETE CBC W/AUTO DIFF WBC: CPT | Performed by: NURSE PRACTITIONER

## 2023-08-08 PROCEDURE — 25000003 PHARM REV CODE 250: Performed by: INTERNAL MEDICINE

## 2023-08-08 PROCEDURE — A4216 STERILE WATER/SALINE, 10 ML: HCPCS | Performed by: INTERNAL MEDICINE

## 2023-08-08 PROCEDURE — 93750 INTERROGATION VAD IN PERSON: CPT | Mod: ,,, | Performed by: INTERNAL MEDICINE

## 2023-08-08 PROCEDURE — 85730 THROMBOPLASTIN TIME PARTIAL: CPT | Performed by: STUDENT IN AN ORGANIZED HEALTH CARE EDUCATION/TRAINING PROGRAM

## 2023-08-08 PROCEDURE — 93750 PR INTERROGATE VENT ASSIST DEV, IN PERSON, W PHYSICIAN ANALYSIS: ICD-10-PCS | Mod: ,,, | Performed by: INTERNAL MEDICINE

## 2023-08-08 PROCEDURE — 80053 COMPREHEN METABOLIC PANEL: CPT | Performed by: NURSE PRACTITIONER

## 2023-08-08 PROCEDURE — 25000003 PHARM REV CODE 250: Performed by: PHYSICIAN ASSISTANT

## 2023-08-08 PROCEDURE — 85610 PROTHROMBIN TIME: CPT | Performed by: HOSPITALIST

## 2023-08-08 PROCEDURE — 63600175 PHARM REV CODE 636 W HCPCS: Performed by: HOSPITALIST

## 2023-08-08 PROCEDURE — 99232 SBSQ HOSP IP/OBS MODERATE 35: CPT | Mod: ,,, | Performed by: INTERNAL MEDICINE

## 2023-08-08 PROCEDURE — 99232 PR SUBSEQUENT HOSPITAL CARE,LEVL II: ICD-10-PCS | Mod: ,,, | Performed by: INTERNAL MEDICINE

## 2023-08-08 PROCEDURE — 99222 1ST HOSP IP/OBS MODERATE 55: CPT | Mod: ,,, | Performed by: STUDENT IN AN ORGANIZED HEALTH CARE EDUCATION/TRAINING PROGRAM

## 2023-08-08 RX ORDER — ASPIRIN 81 MG/1
81 TABLET ORAL DAILY
Qty: 90 TABLET | Refills: 3 | Status: SHIPPED | OUTPATIENT
Start: 2023-08-09

## 2023-08-08 RX ORDER — ASPIRIN 81 MG/1
81 TABLET ORAL DAILY
Qty: 90 TABLET | Refills: 3 | Status: SHIPPED | OUTPATIENT
Start: 2023-08-09 | End: 2023-08-08 | Stop reason: SDUPTHER

## 2023-08-08 RX ORDER — PEN NEEDLE, DIABETIC 30 GX3/16"
1 NEEDLE, DISPOSABLE MISCELLANEOUS
Qty: 200 EACH | Refills: 5 | Status: SHIPPED | OUTPATIENT
Start: 2023-08-08 | End: 2023-08-08 | Stop reason: SDUPTHER

## 2023-08-08 RX ORDER — INSULIN ASPART 100 [IU]/ML
14 INJECTION, SOLUTION INTRAVENOUS; SUBCUTANEOUS
Qty: 18 ML | Refills: 5 | Status: ON HOLD | OUTPATIENT
Start: 2023-08-08 | End: 2023-09-11 | Stop reason: SDUPTHER

## 2023-08-08 RX ORDER — PEN NEEDLE, DIABETIC 30 GX3/16"
1 NEEDLE, DISPOSABLE MISCELLANEOUS
Qty: 200 EACH | Refills: 5 | Status: ON HOLD | OUTPATIENT
Start: 2023-08-08 | End: 2023-11-15 | Stop reason: SDUPTHER

## 2023-08-08 RX ORDER — INSULIN ASPART 100 [IU]/ML
14 INJECTION, SOLUTION INTRAVENOUS; SUBCUTANEOUS
Status: DISCONTINUED | OUTPATIENT
Start: 2023-08-08 | End: 2023-08-09 | Stop reason: HOSPADM

## 2023-08-08 RX ORDER — WARFARIN 3 MG/1
TABLET ORAL
Qty: 60 TABLET | Refills: 5
Start: 2023-08-08 | End: 2023-09-12 | Stop reason: SDUPTHER

## 2023-08-08 RX ORDER — WARFARIN 3 MG/1
6 TABLET ORAL
Status: DISCONTINUED | OUTPATIENT
Start: 2023-08-08 | End: 2023-08-09 | Stop reason: HOSPADM

## 2023-08-08 RX ADMIN — Medication 10 ML: at 12:08

## 2023-08-08 RX ADMIN — LEVETIRACETAM 1000 MG: 500 TABLET, FILM COATED ORAL at 08:08

## 2023-08-08 RX ADMIN — BUSPIRONE HYDROCHLORIDE 10 MG: 10 TABLET ORAL at 08:08

## 2023-08-08 RX ADMIN — INSULIN ASPART 14 UNITS: 100 INJECTION, SOLUTION INTRAVENOUS; SUBCUTANEOUS at 08:08

## 2023-08-08 RX ADMIN — Medication 10 ML: at 06:08

## 2023-08-08 RX ADMIN — WARFARIN SODIUM 6 MG: 3 TABLET ORAL at 05:08

## 2023-08-08 RX ADMIN — ACETAMINOPHEN 650 MG: 325 TABLET ORAL at 07:08

## 2023-08-08 RX ADMIN — INSULIN ASPART 14 UNITS: 100 INJECTION, SOLUTION INTRAVENOUS; SUBCUTANEOUS at 12:08

## 2023-08-08 RX ADMIN — VALSARTAN 40 MG: 40 TABLET, FILM COATED ORAL at 08:08

## 2023-08-08 RX ADMIN — ACETAMINOPHEN 650 MG: 325 TABLET ORAL at 12:08

## 2023-08-08 RX ADMIN — MILRINONE LACTATE 0.25 MCG/KG/MIN: 0.2 INJECTION, SOLUTION INTRAVENOUS at 01:08

## 2023-08-08 RX ADMIN — INSULIN ASPART 3 UNITS: 100 INJECTION, SOLUTION INTRAVENOUS; SUBCUTANEOUS at 12:08

## 2023-08-08 RX ADMIN — FUROSEMIDE 80 MG: 80 TABLET ORAL at 08:08

## 2023-08-08 RX ADMIN — INSULIN ASPART 3 UNITS: 100 INJECTION, SOLUTION INTRAVENOUS; SUBCUTANEOUS at 08:08

## 2023-08-08 RX ADMIN — ASPIRIN 81 MG: 81 TABLET, COATED ORAL at 08:08

## 2023-08-08 RX ADMIN — INSULIN DETEMIR 24 UNITS: 100 INJECTION, SOLUTION SUBCUTANEOUS at 08:08

## 2023-08-08 RX ADMIN — HYDROCODONE BITARTRATE AND ACETAMINOPHEN 1 TABLET: 5; 325 TABLET ORAL at 03:08

## 2023-08-08 NOTE — PROGRESS NOTES
[facility] - Cardiology Stepdown  Pulmonology  Progress Note    Patient Name: Kevan Queen  MRN: [id number]  Admission Date: 7/22/2023  Hospital Length of Stay: 17 days  Code Status: Full Code  Attending Provider: Josh Ryan, *  Primary Care Provider: Rosio Armendariz FNP   Principal Problem: Sepsis    Subjective:     Interval History: Patient feels well this morning. Repeat CT scan done. Primary team planning on discharging with course of anti-biotics.      Objective:     Vital Signs (Most Recent):  Temp: 98.2 °F (36.8 °C) (08/08/23 0759)  Pulse: 108 (08/08/23 0800)  Resp: 16 (08/08/23 0759)  BP: (!) 68/0 (08/08/23 0759)  SpO2: 98 % (08/08/23 0759) Vital Signs (24h Range):  Temp:  [98.2 °F (36.8 °C)-98.8 °F (37.1 °C)] 98.2 °F (36.8 °C)  Pulse:  [101-127] 108  Resp:  [16-20] 16  SpO2:  [97 %-99 %] 98 %  BP: (66-74)/(0) 68/0     Weight: 90.3 kg (199 lb 1.2 oz)  Body mass index is 24.88 kg/m².      Intake/Output Summary (Last 24 hours) at 8/8/2023 1051  Last data filed at 8/8/2023 0800  Gross per 24 hour   Intake 712 ml   Output 2325 ml   Net -1613 ml        Physical Exam  Vitals reviewed.   Constitutional:       General: He is not in acute distress.     Appearance: He is not ill-appearing.   HENT:      Head: Normocephalic and atraumatic.      Right Ear: External ear normal.      Left Ear: External ear normal.   Eyes:      General:         Right eye: No discharge.         Left eye: No discharge.      Conjunctiva/sclera: Conjunctivae normal.   Cardiovascular:      Comments: LVAD hum  Pulmonary:      Effort: Pulmonary effort is normal. No respiratory distress.      Breath sounds: Normal breath sounds. No wheezing.   Abdominal:      General: Abdomen is flat.      Palpations: Abdomen is soft.   Musculoskeletal:      Cervical back: Normal range of motion.      Right lower leg: No edema.      Left lower leg: No edema.   Skin:     General: Skin is warm and dry.   Neurological:      Mental Status: He is alert and  oriented to person, place, and time. Mental status is at baseline.   Psychiatric:         Mood and Affect: Mood normal.         Behavior: Behavior normal.           Review of Systems   Constitutional:  Negative for chills, fatigue and fever.   Respiratory:  Negative for cough and shortness of breath.    Cardiovascular:  Negative for chest pain and leg swelling.   Psychiatric/Behavioral:  Negative for agitation and behavioral problems.        Vents:       Lines/Drains/Airways       Peripherally Inserted Central Catheter Line  Duration             PICC Double Lumen 08/01/23 1121 right basilic 6 days              Line  Duration                  VAD 06/29/22 1115 Left ventricular assist device HeartMate 3 404 days                    Significant Labs:    CBC/Anemia Profile:  Recent Labs   Lab 08/07/23  0654 08/08/23  0424   WBC 15.35* 10.05   HGB 9.2* 8.7*   HCT 27.9* 26.6*    246   MCV 84 84   RDW 20.2* 20.2*        Chemistries:  Recent Labs   Lab 08/06/23  1512 08/07/23  0654 08/08/23  0424   * 133* 135*   K 4.1 4.1 4.1   CL 92* 96 100   CO2 25 25 28   BUN 24* 22* 22*   CREATININE 1.3 1.1 1.0   CALCIUM 8.8 8.9 8.9   ALBUMIN  --  2.2* 2.0*   PROT  --  7.5 7.2   BILITOT  --  1.1* 0.5   ALKPHOS  --  186* 196*   ALT  --  10 15   AST  --  36 44*   MG  --  2.1 2.3   PHOS  --  3.3 3.7       All pertinent labs within the past 24 hours have been reviewed.    Significant Imaging:  I have reviewed all pertinent imaging results/findings within the past 24 hours.    Assessment/Plan:     Pulmonary  Pleural effusion  38 year-old male with LVAD, recent COVID PNA s/p treatment, staph bacteremia that presented with elements of pleuritis, fever found to have CT imaging this admission consistent with small left sided pleural effusion and complicated pleural around left heart border. Given history of LVAD and known bacteremia recommend having the fluid drained. Bedside ultrasound showed small pleural effusion and evidence of  stranding. Suspect this is a para-pneumonic process vs empyema in setting of MSSA bacteremia. CT from June reviewed and no posterior fluid collection at that time. Patient on daily coumadin. CT surgery recommended image guided placement of pigtail catheter into collection. Left chest tube placed by IR on 8/3.    S/P pleural drainage cath placement with studies c/w empyema (+GPC on GS). Almost certainly MSSA in setting of known LVAD drive line infection and bacteremia. Evaluated by thoracic and not operative candidate. S/p IET x2 (started on 8/4/23). Chest tube was disconnected, unclear how this happened. At that time Bedside US w/o obvious residual effusion and pleural catheter was discontinued.     Repeat CT scan of chest done on 8/7/23 showed some improvement of loculated left pleural process, still with residual loculated paramediastinal fluid pockets that were not able to be reached with pleurolytics. Recommend continued antibiotic therapy per ID recommendations. Patient hemodynamically stable, afebrile, and breathing well. Patient likely will need CT surveillance with ID team outpatient to monitor empyema.    Plan / Recs:  - CT scan with improvement on CT scan s/p pleurolytic therapy--no further intervention from Pulmonary team at this time, okay to discharge from pulmonary perspective.  - Antibiotics per ID team, follow up with ID outpatient  - primary with plan for discharge home today                     Prasad Rouse MD  Pulmonology  [facility] - Card[facility] Stepdown

## 2023-08-08 NOTE — NURSING
Pt off of the ancef infusion since 1615. Pt went off the unit for CT scan, while pushing contrast through the PICC access, the IV tubing damaged, leading to the med being wasted. Pharmacy notified twice for a new bag. No meds delivered yet. Report given to night RN.

## 2023-08-08 NOTE — PLAN OF CARE
Patient is ready for discharge. Patient stable alert and oriented. No complaints of pain. Dressing change completed for LVAD and PICC line. Cont infusion of Milrinone gtts @ 0.25 mcg.kg/min. Discussed discharge plan. Reviewed medications and side effects, appointments, and answered questions with patient. Rx sent to pt.'s pharmacy.   Infusion pump set up and meds delivered bedside. Pt waiting on ride.       Problem: Adult Inpatient Plan of Care  Goal: Plan of Care Review  8/8/2023 1318 by Lorri Riggs RN  Outcome: Met  8/8/2023 1318 by Lorri Riggs RN  Outcome: Met  Goal: Patient-Specific Goal (Individualized)  8/8/2023 1318 by Lorri Riggs RN  Outcome: Met  8/8/2023 1318 by Lorri Riggs RN  Outcome: Met  Goal: Absence of Hospital-Acquired Illness or Injury  8/8/2023 1318 by Lorri Riggs RN  Outcome: Met  8/8/2023 1318 by Lorri Riggs RN  Outcome: Met  Goal: Optimal Comfort and Wellbeing  8/8/2023 1318 by Lorri Riggs RN  Outcome: Met  8/8/2023 1318 by Lorri Riggs RN  Outcome: Met  Goal: Readiness for Transition of Care  8/8/2023 1318 by Lorri Riggs RN  Outcome: Met  8/8/2023 1318 by Lorri Riggs RN  Outcome: Met     Problem: Diabetes Comorbidity  Goal: Blood Glucose Level Within Targeted Range  8/8/2023 1318 by Lorri Riggs RN  Outcome: Met  8/8/2023 1318 by Lorri Riggs RN  Outcome: Met     Problem: Infection  Goal: Absence of Infection Signs and Symptoms  8/8/2023 1318 by Lorri Riggs RN  Outcome: Met  8/8/2023 1318 by Lorri Riggs RN  Outcome: Met     Problem: Adjustment to Illness (Sepsis/Septic Shock)  Goal: Optimal Coping  8/8/2023 1318 by Lorri Riggs RN  Outcome: Met  8/8/2023 1318 by Lorri Riggs RN  Outcome: Met     Problem: Bleeding (Sepsis/Septic Shock)  Goal: Absence of Bleeding  8/8/2023 1318 by Lorri Riggs RN  Outcome: Met  8/8/2023 1318 by Lorri Riggs RN  Outcome: Met     Problem: Glycemic Control Impaired (Sepsis/Septic Shock)  Goal: Blood Glucose Level Within Desired  Range  8/8/2023 1318 by Lorri Riggs RN  Outcome: Met  8/8/2023 1318 by Lorri Riggs RN  Outcome: Met     Problem: Infection Progression (Sepsis/Septic Shock)  Goal: Absence of Infection Signs and Symptoms  8/8/2023 1318 by Lorri Riggs RN  Outcome: Met  8/8/2023 1318 by Lorri Riggs RN  Outcome: Met     Problem: Nutrition Impaired (Sepsis/Septic Shock)  Goal: Optimal Nutrition Intake  8/8/2023 1318 by Lorri Riggs RN  Outcome: Met  8/8/2023 1318 by Lorri Riggs RN  Outcome: Met     Problem: Fluid Imbalance (Pneumonia)  Goal: Fluid Balance  8/8/2023 1318 by Lorri Riggs RN  Outcome: Met  8/8/2023 1318 by Lorri Riggs RN  Outcome: Met     Problem: Infection (Pneumonia)  Goal: Resolution of Infection Signs and Symptoms  8/8/2023 1318 by Lorri Riggs RN  Outcome: Met  8/8/2023 1318 by Lorri Riggs RN  Outcome: Met     Problem: Respiratory Compromise (Pneumonia)  Goal: Effective Oxygenation and Ventilation  8/8/2023 1318 by Lorri Riggs RN  Outcome: Met  8/8/2023 1318 by Lorri Riggs RN  Outcome: Met     Problem: Fall Injury Risk  Goal: Absence of Fall and Fall-Related Injury  8/8/2023 1318 by Lorri Riggs RN  Outcome: Met  8/8/2023 1318 by Lorri Riggs RN  Outcome: Met     Problem: Hemodynamic Instability (Ventricular Assist Device)  Goal: Optimal Blood Flow  8/8/2023 1318 by Lorri Riggs RN  Outcome: Met  8/8/2023 1318 by Lorri Riggs RN  Outcome: Met     Problem: Infection (Ventricular Assist Device)  Goal: Absence of Infection Signs and Symptoms  8/8/2023 1318 by Lorri Riggs RN  Outcome: Met  8/8/2023 1318 by Lorri Riggs RN  Outcome: Met     Problem: Right-Sided Heart Compromise (Ventricular Assist Device)  Goal: Effective Right-Sided Heart Function  8/8/2023 1318 by Lorri Riggs, RN  Outcome: Met  8/8/2023 1318 by Lorri Riggs, RN  Outcome: Met      Primary Defect Width (In Cm): 1.3

## 2023-08-08 NOTE — PROGRESS NOTES
Discharge    Pt presents in room aaox4 with open affect. Pt voices agreement with plan to discharge to home today with resumed services of [employer] ph [phone], fax [phone] for  and [facility]. SO does PICC care. Pt states SO will transport pt home today and needs to leave with pt by 3 in order to make it to work by 6. LCSW informed Franki. Pt reports coping well and denies further needs, questions, concerns at this time and none indicated. Providing psychosocial and counseling support, education, resources, assistance and discharge planning as indicated. SW remains available.    Pt reports new phone # is [phone]. LCSW updated in [employer].    Addendum: Per [facility] pt's meds will be here by 4. Pt is aware.

## 2023-08-08 NOTE — PROGRESS NOTES
Rounded on patient prior to discharge. Patient states he is excited to be going home. 30 day dressing kits at bedside for patient to take home. VAD interrogated, parameters WNL, no alarms. No further VAD needs at this time. Will continue to follow as outpatient.

## 2023-08-08 NOTE — PROGRESS NOTES
08/08/2023  Mirela Perez    Current provider:  Josh Ryan, *    Device interrogation:  TXP LVAD INTERROGATIONS 8/8/2023 8/8/2023 8/8/2023 8/7/2023 8/7/2023 8/7/2023 8/7/2023   Type HeartMate3 - - HeartMate3 HeartMate3 HeartMate3 HeartMate3   Flow 4.5 4.4 4.3 4.8 4.1 4.3 4.1   Speed 5100 5100 5100 5100 5100 5100 5100   PI 3.7 3.8 3.3 2.2 3.2 3.4 3.5   Power (Serna) 3.6 3.4 3.3 3.6 3.4 3.3 3.4   LSL 4700 4700 4700 4700 4700 4700 4700   Low Flow Alarm - - - - - - -   High Power Alarm - - - - - - -   Pulsatility Intermittent pulse Intermittent pulse Pulse Intermittent pulse Intermittent pulse Intermittent pulse Intermittent pulse          Rounded on Kevan Queen to ensure all mechanical assist device settings (IABP or VAD) were appropriate and all parameters were within limits.  I was able to ensure all back up equipment was present, the staff had no issues, and the Perfusion Department daily rounding was complete.      For implantable VADs: Interrogation of Ventricular assist device was performed with analysis of device parameters and review of device function. I have personally reviewed the interrogation findings and agree with findings as stated.     In emergency, the nursing units have been notified to contact the perfusion department either by:  Calling [phone] from 630am to 4pm Mon thru Fri, utilizing the On-Call Finder functionality of [employer] and searching for Perfusion, or by contacting the hospital  from 4pm to 630am and on weekends and asking to speak with the perfusionist on call.    8:56 AM

## 2023-08-08 NOTE — HOSPITAL COURSE
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 He was treated with Zosyn and competed 5 days of remdisiver and dexamethasone.  ID following. Fount to be bacteremic and was started on cefazolin for bacteremia. Subsequently, he continued to have left sided chest pain so CT done and he was found to have pleural effusion.  ID recommended Pulm consult.  Pulm recommended thoracic surgery consult for possible VATS but he was deemed not a candidate.  IR consulted for pig tail catheter placement, which was done 8/3 and Gram stain came back +ve for GPC. ID signed off, rec IV Ancef x 6 weeks from chest tube placement so EOC 9/14. Pulm gave three dose of lytics and chest tube was found to be disconnected.  They recommended repeat CT done on 8/7/23  which showed some improvement of loculated left pleural process, still with residual loculated paramediastinal fluid pockets that were not able to be reached with pleurolytics. Recommend continued antibiotic therapy per ID. Patient discharged in stable condition on home Milrinone and Cefazolin with plan for abx until 9/14.  Will need repeat CT once abx therapy is complete.

## 2023-08-08 NOTE — PROGRESS NOTES
Admitted 7/22-8/8 pleuritic chest pain, fevers, and SOB.      Hospital Course: He was given RDV and dex per COVID protocol. He also had recurrence of MSSA DLI complicated by bacteremia and lung empyema. Cefazolin was initiated and a chest tube was placed with administration of intrapleural lytics. He will discharge on cefazolin until at least 9/14.      Pharmacy Interventions/Recommendations:  INR Follow-Up/Discharge Needs:  Repeat INR next week      Calendar updated with recent INR and warfarin doses. Upon d/c, INR is in range and previous warfarin maintenance plan was reduced. Will schedule INR 8/14 as per hospital team recommendation.

## 2023-08-08 NOTE — ASSESSMENT & PLAN NOTE
38 year-old male with LVAD, recent COVID PNA s/p treatment, staph bacteremia that presented with elements of pleuritis, fever found to have CT imaging this admission consistent with small left sided pleural effusion and complicated pleural around left heart border. Given history of LVAD and known bacteremia recommend having the fluid drained. Bedside ultrasound showed small pleural effusion and evidence of stranding. Suspect this is a para-pneumonic process vs empyema in setting of MSSA bacteremia. CT from June reviewed and no posterior fluid collection at that time. Patient on daily coumadin. CT surgery recommended image guided placement of pigtail catheter into collection. Left chest tube placed by IR on 8/3.    S/P pleural drainage cath placement with studies c/w empyema (+GPC on GS). Almost certainly MSSA in setting of known LVAD drive line infection and bacteremia. Evaluated by thoracic and not operative candidate. S/p IET x2 (started on 8/4/23). Chest tube was disconnected, unclear how this happened. At that time Bedside US w/o obvious residual effusion and pleural catheter was discontinued.     Repeat CT scan of chest done on 8/7/23 showed some improvement of loculated left pleural process, still with residual loculated paramediastinal fluid pockets that were not able to be reached with pleurolytics. Recommend continued antibiotic therapy per ID recommendations. Patient hemodynamically stable, afebrile, and breathing well. Patient likely will need    Plan / Recs:  - CT scan with improvement on CT scan s/p pleurolytic therapy--no further intervention from Pulmonary team at this time, okay to discharge from pulmonary perspective.  - Antibiotics per ID team, follow up with ID outpatient  - primary with plan for discharge home today

## 2023-08-08 NOTE — SUBJECTIVE & OBJECTIVE
Interval History: Per pulm: repeat CT of chest done 8/7/23 8/7/23 showed some improvement of loculated left pleural process, still with residual loculated paramediastinal fluid pockets that were not able to be reached with pleurolytics. Recommend continued antibiotic therapy per ID recommendations.  Patient is stable from medical standpoint and reports feeling well.  Will discharge home with home health for Milrinone and Cefazolin until 9/14.  Patient has no new complaints this morning and is ready to go home.       Continuous Infusions:   milrinone 20mg/100ml D5W (200mcg/ml) 0.25 mcg/kg/min (08/07/23 2216)     Scheduled Meds:   aspirin  81 mg Oral Daily    busPIRone  10 mg Oral BID    ceFAZolin (ANCEF) 8 g in dextrose 5 % (D5W) 500 mL CONTINUOUS INFUSION  8 g Intravenous Q24H    furosemide  80 mg Oral Daily    insulin aspart U-100  14 Units Subcutaneous TIDWM    insulin detemir U-100  24 Units Subcutaneous BID    levETIRAcetam  1,000 mg Oral BID    mirtazapine  15 mg Oral Nightly    polyethylene glycol  17 g Oral Daily    senna-docusate 8.6-50 mg  2 tablet Oral BID    sodium chloride 0.9%  10 mL Intravenous Q6H    valsartan  40 mg Oral BID    warfarin  6 mg Oral Every Tues, Thurs, Sat    [START ON 8/9/2023] warfarin  4.5 mg Oral Every Mon, Wed, Fri, Sun     PRN Meds:acetaminophen, dextrose 10%, dextrose 10%, glucagon (human recombinant), glucose, glucose, HYDROcodone-acetaminophen, insulin aspart U-100, Flushing PICC Protocol **AND** sodium chloride 0.9% **AND** sodium chloride 0.9%    Review of patient's allergies indicates:   Allergen Reactions    Bumex [bumetanide] Hives    Torsemide Hives     Objective:     Vital Signs (Most Recent):  Temp: 98.2 °F (36.8 °C) (08/08/23 0759)  Pulse: 108 (08/08/23 0800)  Resp: 16 (08/08/23 0759)  BP: (!) 68/0 (08/08/23 0759)  SpO2: 98 % (08/08/23 0759) Vital Signs (24h Range):  Temp:  [98.2 °F (36.8 °C)-98.8 °F (37.1 °C)] 98.2 °F (36.8 °C)  Pulse:  [101-127] 108  Resp:  [16-20]  16  SpO2:  [97 %-99 %] 98 %  BP: (66-74)/(0) 68/0     Patient Vitals for the past 72 hrs (Last 3 readings):   Weight   08/07/23 0756 90.3 kg (199 lb 1.2 oz)       Body mass index is 24.88 kg/m².      Intake/Output Summary (Last 24 hours) at 8/8/2023 1148  Last data filed at 8/8/2023 0915  Gross per 24 hour   Intake 830 ml   Output 2325 ml   Net -1495 ml         Hemodynamic Parameters:  CVP:  [9 mmHg] 9 mmHg    Telemetry: ST       Physical Exam  Constitutional:       Appearance: Normal appearance.   HENT:      Head: Normocephalic and atraumatic.   Eyes:      Conjunctiva/sclera: Conjunctivae normal.      Pupils: Pupils are equal, round, and reactive to light.   Neck:      Comments: Do not appreciate elevated neck veins  Cardiovascular:      Rate and Rhythm: Regular rhythm. Tachycardia present.      Comments: Smooth VAD hum  Pulmonary:      Effort: Pulmonary effort is normal.      Breath sounds: Normal breath sounds.   Abdominal:      General: Bowel sounds are normal.      Palpations: Abdomen is soft.   Musculoskeletal:         General: No swelling. Normal range of motion.      Cervical back: Normal range of motion and neck supple.   Skin:     General: Skin is warm and dry.      Capillary Refill: Capillary refill takes 2 to 3 seconds.   Neurological:      General: No focal deficit present.      Mental Status: He is alert and oriented to person, place, and time.   Psychiatric:         Mood and Affect: Mood normal.         Behavior: Behavior normal.         Thought Content: Thought content normal.         Judgment: Judgment normal.            Significant Labs:  CBC:  Recent Labs   Lab 08/06/23  0613 08/07/23  0654 08/08/23  0424   WBC 18.10* 15.35* 10.05   RBC 3.44* 3.32* 3.17*   HGB 9.3* 9.2* 8.7*   HCT 29.3* 27.9* 26.6*    272 246   MCV 85 84 84   MCH 27.0 27.7 27.4   MCHC 31.7* 33.0 32.7       BNP:  Recent Labs   Lab 08/02/23  0604 08/04/23  0426 08/07/23  0654   BNP 82 104* 106*       CMP:  Recent Labs   Lab  08/06/23  0613 08/06/23  1512 08/07/23  0654 08/08/23  0424   * 244* 111* 127*   CALCIUM 9.3 8.8 8.9 8.9   ALBUMIN 2.3*  --  2.2* 2.0*   PROT 7.6  --  7.5 7.2   * 130* 133* 135*   K 4.3 4.1 4.1 4.1   CO2 24 25 25 28   CL 94* 92* 96 100   BUN 24* 24* 22* 22*   CREATININE 1.3 1.3 1.1 1.0   ALKPHOS 193*  --  186* 196*   ALT 17  --  10 15   AST 43*  --  36 44*   BILITOT 1.3*  --  1.1* 0.5        Coagulation:   Recent Labs   Lab 08/06/23  0613 08/07/23  0654 08/08/23  0424   INR 1.9*  1.9* 2.2*  2.2* 2.0*  2.0*   APTT 34.9* 35.0* 33.6*       LDH:  Recent Labs   Lab 08/06/23  0613 08/07/23  0654 08/08/23  0424   * 307* 284*       Microbiology:  Microbiology Results (last 7 days)       Procedure Component Value Units Date/Time    Blood culture [[id number]] Collected: 08/05/23 0838    Order Status: Completed Specimen: Blood Updated: 08/08/23 1012     Blood Culture, Routine No Growth to date      No Growth to date      No Growth to date      No Growth to date    Blood culture [[id number]] Collected: 08/05/23 0838    Order Status: Completed Specimen: Blood Updated: 08/08/23 1012     Blood Culture, Routine No Growth to date      No Growth to date      No Growth to date      No Growth to date    Culture, Anaerobic [[id number]] Collected: 08/03/23 1655    Order Status: Completed Specimen: Pleural Fluid Updated: 08/07/23 0900     Anaerobic Culture Culture in progress    Aerobic culture [[id number]] Collected: 08/03/23 1655    Order Status: Completed Specimen: Pleural Fluid Updated: 08/07/23 0843     Aerobic Bacterial Culture No growth    AFB Culture & Smear [[id number]] Collected: 08/03/23 1655    Order Status: Completed Specimen: Pleural Fluid Updated: 08/04/23 2127     AFB Culture & Smear Culture in progress     AFB CULTURE STAIN No acid fast bacilli seen.    Blood culture [[id number]] Collected: 07/30/23 0810    Order Status: Completed Specimen: Blood Updated: 08/04/23 1012     Blood Culture, Routine No growth  after 5 days.    Blood culture [[id number]] Collected: 07/30/23 0810    Order Status: Completed Specimen: Blood Updated: 08/04/23 1012     Blood Culture, Routine No growth after 5 days.    Gram stain [[id number]] Collected: 08/03/23 1655    Order Status: Completed Specimen: Pleural Fluid Updated: 08/03/23 2243     Gram Stain Result Rare WBC's      Rare Gram positive cocci    Blood culture [[id number]] Collected: 07/29/23 1130    Order Status: Completed Specimen: Blood Updated: 08/03/23 1412     Blood Culture, Routine No growth after 5 days.    Narrative:      Please obtain 2 sets after RIJ is pulled, thank you    Blood culture [[id number]] Collected: 07/29/23 1132    Order Status: Completed Specimen: Blood Updated: 08/03/23 1412     Blood Culture, Routine No growth after 5 days.    Narrative:      Please obtain 2 sets after RIJ is pulled, thank you  Rt hand            I have reviewed all pertinent labs within the past 24 hours.    Estimated Creatinine Clearance: 119.7 mL/min (based on SCr of 1 mg/dL).    Diagnostic Results:  I have reviewed and interpreted all pertinent imaging results/findings within the past 24 hours.

## 2023-08-08 NOTE — PROGRESS NOTES
"DISCHARGE NOTE:    Kevan Queen is a 38 y.o. male s/p HM3 VAD admitted for pleuritic chest pain, fevers, and SOB.     Hospital Course: He was given RDV and dex per COVID protocol. He also had recurrence of MSSA DLI complicated by bacteremia and lung empyema. Cefazolin was initiated and a chest tube was placed with administration of intrapleural lytics. He will discharge on cefazolin until at least 9/14.     Pharmacy Interventions/Recommendations:  1) INR Goal: 2-3     2) Antiplatelet Agents: ASA 81mg/day restarted     3) Heparin Bridging:  Yes     4) INR Follow-Up/Discharge Needs:  Repeat INR next week    See list of discharge medication for dosing instructions.     Kevan Queen and his caregiver verbalized their understanding and had the opportunity to ask questions.      Discharge Medications:     Medication List        START taking these medications      aspirin 81 MG EC tablet  Commonly known as: ECOTRIN  Take 1 tablet (81 mg total) by mouth once daily.  Start taking on: August 9, 2023     dextrose 5 % (D5W) SolP 500 mL with ceFAZolin 1 gram SolR  8 g per day IV continuously     pen needle, diabetic 32 gauge x 5/32" Ndle  1 each by Misc.(Non-Drug; Combo Route) route 4 (four) times daily.            CHANGE how you take these medications      insulin aspart U-100 100 unit/mL (3 mL) Inpn pen  Commonly known as: NovoLOG  Inject 14 Units into the skin 3 (three) times daily with meals. Plus Sliding Scale: 151-200: +1, 201-250: +2, 251-300: +3, 301-350: +4 and call MD  What changed:   how much to take  additional instructions     insulin detemir U-100 (Levemir) 100 unit/mL (3 mL) Inpn pen  Inject 24 Units into the skin 2 (two) times daily.  What changed: how much to take     warfarin 3 MG tablet  Commonly known as: COUMADIN  Take 1.5 tablets (4.5mg) orally daily, except 2 tablets (6mg) on Wednesdays and Saturdays  What changed:   how much to take  how to take this  when to take this  additional instructions  Another " "medication with the same name was removed. Continue taking this medication, and follow the directions you see here.            CONTINUE taking these medications      busPIRone 10 MG tablet  Commonly known as: BUSPAR     furosemide 80 MG tablet  Commonly known as: LASIX  Take 1 tablet (80 mg total) by mouth once daily.     levETIRAcetam 1000 MG tablet  Commonly known as: KEPPRA  Take 1 tablet (1,000 mg total) by mouth 2 (two) times daily.     milrinone 20mg/100ml D5W (200mcg/ml) 20 mg/100 mL (200 mcg/mL) infusion  Commonly known as: PRIMACOR  Inject 34.875 mcg/min into the vein continuous.     mirtazapine 15 MG tablet  Commonly known as: REMERON  Take 1 tablet (15 mg total) by mouth nightly.     TRUE METRIX GLUCOSE METER Misc  Generic drug: blood-glucose meter  Use as instructed     TRUE METRIX GLUCOSE TEST STRIP Strp  Generic drug: blood sugar diagnostic  Use to test blood glucose 4 (four) times daily.     TRUEPLUS LANCETS 33 gauge Misc  Generic drug: lancets  Use to test blood glucose 4 (four) times daily.     valsartan 40 MG tablet  Commonly known as: DIOVAN  Take 1 tablet (40 mg total) by mouth 2 (two) times daily.            STOP taking these medications      cyclobenzaprine 7.5 MG Tab  Commonly known as: FEXMID     doxycycline 100 MG Cap  Commonly known as: VIBRAMYCIN     INV ASPIRIN 100MG/PLACEBO     milrinone 1 mg/mL injection  Commonly known as: PRIMACOR     potassium chloride 20 mEq  Commonly known as: K-TAB     spironolactone 25 MG tablet  Commonly known as: ALDACTONE               Where to Get Your Medications        These medications were sent to [facility] [address], [place] [address]      Hours: Mon-Fri 7a-7p, Sat-Sun 10a-4p Phone: [phone]   aspirin 81 MG EC tablet  insulin aspart U-100 100 unit/mL (3 mL) Inpn pen  insulin detemir U-100 (Levemir) 100 unit/mL (3 mL) Inpn pen  pen needle, diabetic 32 gauge x 5/32" Ndle       Information about where to get these " medications is not yet available    Ask your nurse or doctor about these medications  dextrose 5 % (D5W) SolP 500 mL with ceFAZolin 1 gram SolR  warfarin 3 MG tablet

## 2023-08-08 NOTE — SUBJECTIVE & OBJECTIVE
Interval History: Patient feels well this morning. Repeat CT scan done. Primary team planning on discharging with course of anti-biotics.      Objective:     Vital Signs (Most Recent):  Temp: 98.2 °F (36.8 °C) (08/08/23 0759)  Pulse: 108 (08/08/23 0800)  Resp: 16 (08/08/23 0759)  BP: (!) 68/0 (08/08/23 0759)  SpO2: 98 % (08/08/23 0759) Vital Signs (24h Range):  Temp:  [98.2 °F (36.8 °C)-98.8 °F (37.1 °C)] 98.2 °F (36.8 °C)  Pulse:  [101-127] 108  Resp:  [16-20] 16  SpO2:  [97 %-99 %] 98 %  BP: (66-74)/(0) 68/0     Weight: 90.3 kg (199 lb 1.2 oz)  Body mass index is 24.88 kg/m².      Intake/Output Summary (Last 24 hours) at 8/8/2023 1051  Last data filed at 8/8/2023 0800  Gross per 24 hour   Intake 712 ml   Output 2325 ml   Net -1613 ml        Physical Exam  Vitals reviewed.   Constitutional:       General: He is not in acute distress.     Appearance: He is not ill-appearing.   HENT:      Head: Normocephalic and atraumatic.      Right Ear: External ear normal.      Left Ear: External ear normal.   Eyes:      General:         Right eye: No discharge.         Left eye: No discharge.      Conjunctiva/sclera: Conjunctivae normal.   Cardiovascular:      Comments: LVAD hum  Pulmonary:      Effort: Pulmonary effort is normal. No respiratory distress.      Breath sounds: Normal breath sounds. No wheezing.   Abdominal:      General: Abdomen is flat.      Palpations: Abdomen is soft.   Musculoskeletal:      Cervical back: Normal range of motion.      Right lower leg: No edema.      Left lower leg: No edema.   Skin:     General: Skin is warm and dry.   Neurological:      Mental Status: He is alert and oriented to person, place, and time. Mental status is at baseline.   Psychiatric:         Mood and Affect: Mood normal.         Behavior: Behavior normal.           Review of Systems   Constitutional:  Negative for chills, fatigue and fever.   Respiratory:  Negative for cough and shortness of breath.    Cardiovascular:  Negative  for chest pain and leg swelling.   Psychiatric/Behavioral:  Negative for agitation and behavioral problems.        Vents:       Lines/Drains/Airways       Peripherally Inserted Central Catheter Line  Duration             PICC Double Lumen 08/01/23 1121 right basilic 6 days              Line  Duration                  VAD 06/29/22 1115 Left ventricular assist device HeartMate 3 404 days                    Significant Labs:    CBC/Anemia Profile:  Recent Labs   Lab 08/07/23  0654 08/08/23  0424   WBC 15.35* 10.05   HGB 9.2* 8.7*   HCT 27.9* 26.6*    246   MCV 84 84   RDW 20.2* 20.2*        Chemistries:  Recent Labs   Lab 08/06/23  1512 08/07/23  0654 08/08/23  0424   * 133* 135*   K 4.1 4.1 4.1   CL 92* 96 100   CO2 25 25 28   BUN 24* 22* 22*   CREATININE 1.3 1.1 1.0   CALCIUM 8.8 8.9 8.9   ALBUMIN  --  2.2* 2.0*   PROT  --  7.5 7.2   BILITOT  --  1.1* 0.5   ALKPHOS  --  186* 196*   ALT  --  10 15   AST  --  36 44*   MG  --  2.1 2.3   PHOS  --  3.3 3.7       All pertinent labs within the past 24 hours have been reviewed.    Significant Imaging:  I have reviewed all pertinent imaging results/findings within the past 24 hours.

## 2023-08-08 NOTE — ASSESSMENT & PLAN NOTE
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  -Continue Coumadin, Goal INR 2.0-3.0. subtherapeutic at 1.5 after coumadin held in setting of INR 3.4. INR 2.0 today   -LDH is stable.  Will continue to monitor daily  -Speed set at 5100, LSL 4700 rpm  -Echo: 7/31/23 EF: 10-15%, LVEDD: 6.87 cm.  No vegation appreciated     Procedure: Device Interrogation Including analysis of device parameters  Current Settings: Ventricular Assist Device  Review of device function is stable    TXP LVAD INTERROGATIONS 8/8/2023 8/8/2023 8/8/2023 8/7/2023 8/7/2023 8/7/2023 8/7/2023   Type HeartMate3 - - HeartMate3 HeartMate3 HeartMate3 HeartMate3   Flow 4.5 4.4 4.3 4.8 4.1 4.3 4.1   Speed 5100 5100 5100 5100 5100 5100 5100   PI 3.7 3.8 3.3 2.2 3.2 3.4 3.5   Power (Serna) 3.6 3.4 3.3 3.6 3.4 3.3 3.4   LSL 4700 4700 4700 4700 4700 4700 4700   Low Flow Alarm - - - - - - -   High Power Alarm - - - - - - -   Pulsatility Intermittent pulse Intermittent pulse Pulse Intermittent pulse Intermittent pulse Intermittent pulse Intermittent pulse

## 2023-08-08 NOTE — CARE UPDATE
Care Update:     No acute events overnight. Patient on the CSU in room 314/314 A. Blood glucose stable. BG mostly at goal on current insulin regimen (SSI, prandial, and basal insulin ). Steroid use- None.    Renal function- Normal   Vasopressors-  None       Diet diabetic [facility]; 2800 Calorie     POCT Glucose   Date Value Ref Range Status   08/07/2023 105 70 - 110 mg/dL Final   08/07/2023 224 (H) 70 - 110 mg/dL Final   08/07/2023 78 70 - 110 mg/dL Final   08/07/2023 204 (H) 70 - 110 mg/dL Final   08/06/2023 201 (H) 70 - 110 mg/dL Final   08/06/2023 181 (H) 70 - 110 mg/dL Final   08/06/2023 70 70 - 110 mg/dL Final   08/06/2023 198 (H) 70 - 110 mg/dL Final   08/05/2023 119 (H) 70 - 110 mg/dL Final   08/05/2023 104 70 - 110 mg/dL Final   08/05/2023 260 (H) 70 - 110 mg/dL Final   08/05/2023 85 70 - 110 mg/dL Final   08/05/2023 215 (H) 70 - 110 mg/dL Final     Lab Results   Component Value Date    HGBA1C 6.7 (H) 07/22/2023       Diabetes Medications               insulin aspart U-100 (NOVOLOG) 100 unit/mL (3 mL) InPn pen Inject 16 Units into the skin 3 (three) times daily with meals. Plus Sliding Scale: 151-200: +1, 201-250: +2, 251-300: +3, 301-350: +4 and call MD; Max Daily Dose: 60 units    insulin detemir U-100, Levemir, 100 unit/mL (3 mL) SubQ InPn pen Inject 22 Units into the skin 2 (two) times daily.          Endocrine  Type 2 diabetes mellitus with hyperglycemia  Endocrinology consulted for BG management.   BG goal 140-180  BG below goal at times post prandial.  Will decrease mealtime insulin.     - Levemir (Insulin Detemir) 24 units BID   - Novolog (Insulin Aspart) 14 units TIDWM and prn for BG excursions MDC SSI (150/25)   - BG checks AC/HS/0200  - Hypoglycemia protocol in place  - If blood glucose greater than 300, please ask patient not to eat food or drink anything other than water until correctional insulin has brought it back below 250    ** Please notify Endocrine for any change and/or advance  in diet**  ** Please call Endocrine for any BG related issues **    Discharge Planning:   TBD. Please notify endocrinology prior to discharge.

## 2023-08-08 NOTE — PROGRESS NOTES
[facility] - Cardiology Stepdown  Heart Transplant  Progress Note    Patient Name: Kevan Queen  MRN: [id number]  Admission Date: 7/22/2023  Hospital Length of Stay: 17 days  Attending Physician: Josh Ryan, *  Primary Care Provider: Rosio Armendariz FNP  Principal Problem:Sepsis    Subjective:     Interval History: Per pulm: repeat CT of chest done 8/7/23 8/7/23 showed some improvement of loculated left pleural process, still with residual loculated paramediastinal fluid pockets that were not able to be reached with pleurolytics. Recommend continued antibiotic therapy per ID recommendations.  Patient is stable from medical standpoint and reports feeling well.  Will discharge home with home health for Milrinone and Cefazolin until 9/14.  Patient has no new complaints this morning and is ready to go home.       Continuous Infusions:   milrinone 20mg/100ml D5W (200mcg/ml) 0.25 mcg/kg/min (08/07/23 2216)     Scheduled Meds:   aspirin  81 mg Oral Daily    busPIRone  10 mg Oral BID    ceFAZolin (ANCEF) 8 g in dextrose 5 % (D5W) 500 mL CONTINUOUS INFUSION  8 g Intravenous Q24H    furosemide  80 mg Oral Daily    insulin aspart U-100  14 Units Subcutaneous TIDWM    insulin detemir U-100  24 Units Subcutaneous BID    levETIRAcetam  1,000 mg Oral BID    mirtazapine  15 mg Oral Nightly    polyethylene glycol  17 g Oral Daily    senna-docusate 8.6-50 mg  2 tablet Oral BID    sodium chloride 0.9%  10 mL Intravenous Q6H    valsartan  40 mg Oral BID    warfarin  6 mg Oral Every Tues, Thurs, Sat    [START ON 8/9/2023] warfarin  4.5 mg Oral Every Mon, Wed, Fri, Sun     PRN Meds:acetaminophen, dextrose 10%, dextrose 10%, glucagon (human recombinant), glucose, glucose, HYDROcodone-acetaminophen, insulin aspart U-100, Flushing PICC Protocol **AND** sodium chloride 0.9% **AND** sodium chloride 0.9%    Review of patient's allergies indicates:   Allergen Reactions    Bumex [bumetanide] Hives    Torsemide Hives      Objective:     Vital Signs (Most Recent):  Temp: 98.2 °F (36.8 °C) (08/08/23 0759)  Pulse: 108 (08/08/23 0800)  Resp: 16 (08/08/23 0759)  BP: (!) 68/0 (08/08/23 0759)  SpO2: 98 % (08/08/23 0759) Vital Signs (24h Range):  Temp:  [98.2 °F (36.8 °C)-98.8 °F (37.1 °C)] 98.2 °F (36.8 °C)  Pulse:  [101-127] 108  Resp:  [16-20] 16  SpO2:  [97 %-99 %] 98 %  BP: (66-74)/(0) 68/0     Patient Vitals for the past 72 hrs (Last 3 readings):   Weight   08/07/23 0756 90.3 kg (199 lb 1.2 oz)       Body mass index is 24.88 kg/m².      Intake/Output Summary (Last 24 hours) at 8/8/2023 1148  Last data filed at 8/8/2023 0915  Gross per 24 hour   Intake 830 ml   Output 2325 ml   Net -1495 ml         Hemodynamic Parameters:  CVP:  [9 mmHg] 9 mmHg    Telemetry: ST       Physical Exam  Constitutional:       Appearance: Normal appearance.   HENT:      Head: Normocephalic and atraumatic.   Eyes:      Conjunctiva/sclera: Conjunctivae normal.      Pupils: Pupils are equal, round, and reactive to light.   Neck:      Comments: Do not appreciate elevated neck veins  Cardiovascular:      Rate and Rhythm: Regular rhythm. Tachycardia present.      Comments: Smooth VAD hum  Pulmonary:      Effort: Pulmonary effort is normal.      Breath sounds: Normal breath sounds.   Abdominal:      General: Bowel sounds are normal.      Palpations: Abdomen is soft.   Musculoskeletal:         General: No swelling. Normal range of motion.      Cervical back: Normal range of motion and neck supple.   Skin:     General: Skin is warm and dry.      Capillary Refill: Capillary refill takes 2 to 3 seconds.   Neurological:      General: No focal deficit present.      Mental Status: He is alert and oriented to person, place, and time.   Psychiatric:         Mood and Affect: Mood normal.         Behavior: Behavior normal.         Thought Content: Thought content normal.         Judgment: Judgment normal.            Significant Labs:  CBC:  Recent Labs   Lab 08/06/23  0613  08/07/23  0654 08/08/23  0424   WBC 18.10* 15.35* 10.05   RBC 3.44* 3.32* 3.17*   HGB 9.3* 9.2* 8.7*   HCT 29.3* 27.9* 26.6*    272 246   MCV 85 84 84   MCH 27.0 27.7 27.4   MCHC 31.7* 33.0 32.7       BNP:  Recent Labs   Lab 08/02/23  0604 08/04/23  0426 08/07/23  0654   BNP 82 104* 106*       CMP:  Recent Labs   Lab 08/06/23  0613 08/06/23  1512 08/07/23  0654 08/08/23  0424   * 244* 111* 127*   CALCIUM 9.3 8.8 8.9 8.9   ALBUMIN 2.3*  --  2.2* 2.0*   PROT 7.6  --  7.5 7.2   * 130* 133* 135*   K 4.3 4.1 4.1 4.1   CO2 24 25 25 28   CL 94* 92* 96 100   BUN 24* 24* 22* 22*   CREATININE 1.3 1.3 1.1 1.0   ALKPHOS 193*  --  186* 196*   ALT 17  --  10 15   AST 43*  --  36 44*   BILITOT 1.3*  --  1.1* 0.5        Coagulation:   Recent Labs   Lab 08/06/23  0613 08/07/23  0654 08/08/23  0424   INR 1.9*  1.9* 2.2*  2.2* 2.0*  2.0*   APTT 34.9* 35.0* 33.6*       LDH:  Recent Labs   Lab 08/06/23  0613 08/07/23  0654 08/08/23  0424   * 307* 284*       Microbiology:  Microbiology Results (last 7 days)       Procedure Component Value Units Date/Time    Blood culture [[id number]] Collected: 08/05/23 0838    Order Status: Completed Specimen: Blood Updated: 08/08/23 1012     Blood Culture, Routine No Growth to date      No Growth to date      No Growth to date      No Growth to date    Blood culture [[id number]] Collected: 08/05/23 0838    Order Status: Completed Specimen: Blood Updated: 08/08/23 1012     Blood Culture, Routine No Growth to date      No Growth to date      No Growth to date      No Growth to date    Culture, Anaerobic [[id number]] Collected: 08/03/23 1655    Order Status: Completed Specimen: Pleural Fluid Updated: 08/07/23 0900     Anaerobic Culture Culture in progress    Aerobic culture [[id number]] Collected: 08/03/23 1655    Order Status: Completed Specimen: Pleural Fluid Updated: 08/07/23 0843     Aerobic Bacterial Culture No growth    AFB Culture & Smear [[id number]] Collected: 08/03/23  1655    Order Status: Completed Specimen: Pleural Fluid Updated: 08/04/23 2127     AFB Culture & Smear Culture in progress     AFB CULTURE STAIN No acid fast bacilli seen.    Blood culture [[id number]] Collected: 07/30/23 0810    Order Status: Completed Specimen: Blood Updated: 08/04/23 1012     Blood Culture, Routine No growth after 5 days.    Blood culture [[id number]] Collected: 07/30/23 0810    Order Status: Completed Specimen: Blood Updated: 08/04/23 1012     Blood Culture, Routine No growth after 5 days.    Gram stain [[id number]] Collected: 08/03/23 1655    Order Status: Completed Specimen: Pleural Fluid Updated: 08/03/23 2243     Gram Stain Result Rare WBC's      Rare Gram positive cocci    Blood culture [[id number]] Collected: 07/29/23 1130    Order Status: Completed Specimen: Blood Updated: 08/03/23 1412     Blood Culture, Routine No growth after 5 days.    Narrative:      Please obtain 2 sets after RIJ is pulled, thank you    Blood culture [[id number]] Collected: 07/29/23 1132    Order Status: Completed Specimen: Blood Updated: 08/03/23 1412     Blood Culture, Routine No growth after 5 days.    Narrative:      Please obtain 2 sets after RIJ is pulled, thank you  Rt hand            I have reviewed all pertinent labs within the past 24 hours.    Estimated Creatinine Clearance: 119.7 mL/min (based on SCr of 1 mg/dL).    Diagnostic Results:  I have reviewed and interpreted all pertinent imaging results/findings within the past 24 hours.    Assessment and Plan:     Patient is a 36 yo black male with stage d HFrEF, NICM, ? 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 He underwent RVAD removal and chest closure 6/30/2022.  He was weaned off  but he had to restarted due to RVF. He was eventually transitioned to milrinone (secondary to  shortage) now  on 0.25 mcg/kg/min.  Patient also has history of driveline infection.  Patient presented to the emergency room today because of pleuritic chest pain that has been going on for the last 3 days however it was more intense today.  Also complains of having shortness of breath and fevers associated with it.  He was spiking fever of 102 in the emergency room with elevated white blood cell count up to 17,000. He was treated with Zosyn.  He was sent here for admission.  Patient was admitted a month back for COVID infection for which he received remdesivir for 3 days.  He is on doxycycline for chronic driveline infection.  He is on Milrinone for RV failure.  Patient denies having any low flow alarms on the pump.  Also denies having any lower extremity edema, increased discharge from his driveline site.  Patient has ground-glass opacities on imaging from outside hospital      * Sepsis  -Patient presents with elevated fevers, white blood cell count, elevated lactic acid.   -Possible Secondary to COVID-19 infection.  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 Repeated 7/29 with NGTD. Repeated again 7/30 NGTD  -PICC line replaced 8/1  -ECHO done 7/31 and no vegetations appreciated   -CT 8/1 with loculated fluid along Left lateral hear border, thoracic surgery consulted, not a candidate for VATS. Chest tube placed with IR 8/3, pleural fluid cultures positive for GPC  -Continue Ancef 8g IV q 24 hours for total of 6 weeks with end date 9/14 (6 weeks after chest tube placement). ID signed off 8/4  -Received 2 doses of intrapleural lytics Repeat blood cultures NGTD      Bacteremia due to Staphylococcus  -See sepsis    Pleural effusion  -CT imaging this admission consistent with small left sided pleural effusion and complicated pleural around left heart border  -Pulm consulted- suspected para-pneumonic process vs empyema in  setting of MSSA bacteremia  -Thoracic surgery consulted for VATS and patient not candidate  -IR consulted for CT guided chest tube placed in posterior collection 8/3  -pulm following and plan to administer intrapleural lytics x 3 days (started 8/4)  -Gram stain from pleural fluid + GPC.  Patient on Cefazolin  - Repeat CT 8/7/23 showed some improvement of loculated left pleural process, still with residual loculated paramediastinal fluid pockets that were not able to be reached with pleurolytics. Recommend continued antibiotic therapy per ID recommendations    LVAD (left ventricular assist device) present  -HeartMate 3 Implanted on 6/29/2022 as DT with RV failure on milrinone at 0.25 mcg/kg/min.  -Continue Coumadin, Goal INR 2.0-3.0. subtherapeutic at 1.5 after coumadin held in setting of INR 3.4. INR 2.0 today   -LDH is stable.  Will continue to monitor daily  -Speed set at 5100, LSL 4700 rpm  -Echo: 7/31/23 EF: 10-15%, LVEDD: 6.87 cm.  No vegation appreciated     Procedure: Device Interrogation Including analysis of device parameters  Current Settings: Ventricular Assist Device  Review of device function is stable    TXP LVAD INTERROGATIONS 8/8/2023 8/8/2023 8/8/2023 8/7/2023 8/7/2023 8/7/2023 8/7/2023   Type HeartMate3 - - HeartMate3 HeartMate3 HeartMate3 HeartMate3   Flow 4.5 4.4 4.3 4.8 4.1 4.3 4.1   Speed 5100 5100 5100 5100 5100 5100 5100   PI 3.7 3.8 3.3 2.2 3.2 3.4 3.5   Power (Serna) 3.6 3.4 3.3 3.6 3.4 3.3 3.4   LSL 4700 4700 4700 4700 4700 4700 4700   Low Flow Alarm - - - - - - -   High Power Alarm - - - - - - -   Pulsatility Intermittent pulse Intermittent pulse Pulse Intermittent pulse Intermittent pulse Intermittent pulse Intermittent pulse       Chronic combined systolic and diastolic heart failure  -NICM  -Last 2D Echo 10/31/22: LVEF 10%, LVEDD 7.25 cm  -Euvolemic on examination today, CVP 11 via PICC  -Current diuretic regimen: Lasix 80mg Qdaily  -Status post LVAD  -GDMT: On Valsartan, Aldactone and  Toprol at home. Valsartan 40 mg po bid resumed.  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  Now with new gram + bacteremia  -On Ancef  -Repeat CT of C/A/P 8/1-prior stranding noted around DL site resolved    Type 2 diabetes mellitus with hyperglycemia  -Will keep him on sliding scale and a.c. HS    Seizure-like activity  -On on DEYA Irizarry  Heart Transplant  [facility] - Cardiology Stepdown

## 2023-08-08 NOTE — NURSING
"LVAD dressing change completed using sterile technique with daily kit. DLES is a "2" with minimal dried drainage noted on the drain sponge. Tolerated without any complication. No redness, or tenderness noted.      PICC line dressing change completed using sterile technique. Site clean, dry, and intact. No swelling, hematoma, or drainage noted. Next dressing change due on 8/15/23.   "

## 2023-08-08 NOTE — PROGRESS NOTES
Spoke to patient regarding discharge medication dosing. Patient verified a discharge warfarin dose of 6 mg every Wed/Sat, and 4.5 mg all other days. INR scheduled for 08.14.23.

## 2023-08-08 NOTE — PLAN OF CARE
Plan of care discussed with patient.  Patient ambulating independently, fall precautions in place. LVAD DP and numbers WNL, smooth LVAD hum. Patient has no complaints of pain. Discussed medications and care.  Patient has no questions at this time. Will continue to monitor.     BG checked. VSS. I&O monitored. Milrinone gtt @0.25 mcg/kg/min. Ancef infusion re started once received from pharmacy. Pain meds given with some relief.     CVP 9

## 2023-08-08 NOTE — DISCHARGE SUMMARY
[facility] - [facility]iology Stepdown  Heart Transplant  Discharge Summary      Patient Name: Kevan Queen  MRN: [id number]  Admission Date: 7/22/2023  Hospital Length of Stay: 17 days  Discharge Date and Time: 08/08/2023 1:03 PM  Attending Physician: Josh Ryan, *   Discharging Provider: DEYA Martinez  Primary Care Provider: Rosio Armendariz FNP     HPI: Patient is a 36 yo black male with stage d HFrEF, NICM, ? 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 He underwent RVAD removal and chest closure 6/30/2022.  He was weaned off  but he had to restarted due to RVF. He was eventually transitioned to milrinone (secondary to  shortage) now on 0.25 mcg/kg/min.  Patient also has history of driveline infection.  Patient presented to the emergency room today because of pleuritic chest pain that has been going on for the last 3 days however it was more intense today.  Also complains of having shortness of breath and fevers associated with it.  He was spiking fever of 102 in the emergency room with elevated white blood cell count up to 17,000. He was treated with Zosyn.  He was sent here for admission.  Patient was admitted a month back for COVID infection for which he received remdesivir for 3 days.  He is on doxycycline for chronic driveline infection.  He is on Milrinone for RV failure.  Patient denies having any low flow alarms on the pump.  Also denies having any lower extremity edema, increased discharge from his driveline site.  Patient has ground-glass opacities on imaging from outside hospital      * No surgery found *     Hospital Course: 37yo black male with stage d HFrEF, NICM, Familial CM ( h/o PSA, DM2 on insulin who is s/p DT-HM3 implantation 6/23/2022, chronic DLES infection on Doxycycline, post op course complicated by early RV  failure requiring RVAD, currently on home milrinone admitted for sepsis/covid. He was treated with Zosyn and competed 5 days of remdisiver and dexamethasone.  ID following. Fount to be bacteremic and was started on cefazolin for bacteremia. Subsequently, he continued to have left sided chest pain so CT done and he was found to have pleural effusion.  ID recommended Pulm consult.  Pulm recommended thoracic surgery consult for possible VATS but he was deemed not a candidate.  IR consulted for pig tail catheter placement, which was done 8/3 and Gram stain came back +ve for GPC. ID signed off, rec IV Ancef x 6 weeks from chest tube placement so EOC 9/14. Pulm gave three dose of lytics and chest tube was found to be disconnected.  They recommended repeat CT done on 8/7/23  which showed some improvement of loculated left pleural process, still with residual loculated paramediastinal fluid pockets that were not able to be reached with pleurolytics. Recommend continued antibiotic therapy per ID. Patient discharged in stable condition on home Milrinone and Cefazolin with plan for abx until 9/14.  Will need repeat CT once abx therapy is complete.       Goals of Care Treatment Preferences:  Code Status: Full Code    Health care agent[facility] agent number: [phone]                   Consults (From admission, onward)        Status Ordering Provider     Inpatient consult to Interventional Radiology  Once        Provider:  (Not yet assigned)    Completed ALVIN, LAY     Inpatient consult to Cardiothoracic Surgery  Once        Provider:  (Not yet assigned)    Completed ALVIN, LAY     Inpatient consult to Pulmonology  Once        Provider:  (Not yet assigned)    Completed ALVIN, LAY     Inpatient consult to PICC team (NIAS)  Once        Provider:  (Not yet assigned)    Completed ALVIN, LAY     Inpatient consult to Infectious Diseases  Once        Provider:  (Not yet assigned)     Completed RODRÍGUEZ CARLSON     Inpatient consult to Registered Dietitian/Nutritionist  Once        Provider:  (Not yet assigned)    Completed JACKSON MERRITT     Inpatient consult to Endocrinology  Once        Provider:  (Not yet assigned)    Completed JACKSON MERRITT          Significant Diagnostic Studies:   See chart for full details    Pending Diagnostic Studies:     Procedure Component Value Units Date/Time    Urinalysis [[id number]] Collected: 07/22/23 0445    Order Status: Sent Lab Status: In process Updated: 07/22/23 0446    Specimen: Urine         Final Active Diagnoses:    Diagnosis Date Noted POA    PRINCIPAL PROBLEM:  Sepsis [A41.9] 06/03/2023 Yes    Bacteremia due to Staphylococcus [R78.81, B95.8] 07/23/2023 Yes    Pleural effusion [J90] 08/02/2023 Unknown    LVAD (left ventricular assist device) present [Z95.811] 06/30/2022 Not Applicable    Chronic combined systolic and diastolic heart failure [I50.42] 11/05/2020 Yes    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) [T82.7XXA] 07/23/2023 Unknown    Type 2 diabetes mellitus with hyperglycemia [E11.65] 05/25/2022 Yes    Seizure-like activity [R56.9] 07/20/2022 Yes    Chest pain [R07.9] 07/28/2023 Yes    Severe sepsis [A41.9, R65.20] 07/28/2023 Yes    Tachycardia [R00.0]  Yes      Problems Resolved During this Admission:    Diagnosis Date Noted Date Resolved POA    CAP (community acquired pneumonia) [J18.9] 07/22/2023 07/24/2023 Yes    Acute respiratory failure with hypoxia [J96.01] 07/22/2023 07/30/2023 Unknown      Discharged Condition: stable    Disposition: Home or Self Care    Follow Up:    Patient Instructions:      Diet Cardiac     Notify your health care provider if you experience any of the following:  redness, tenderness, or signs of infection (pain, swelling, redness, odor or green/yellow discharge around incision site)     Notify your health care provider if you experience any of the following:   "difficulty breathing or increased cough     Activity as tolerated     Medications:  Reconciled Home Medications:      Medication List      START taking these medications    aspirin 81 MG EC tablet  Commonly known as: ECOTRIN  Take 1 tablet (81 mg total) by mouth once daily.  Start taking on: August 9, 2023     dextrose 5 % (D5W) SolP 500 mL with ceFAZolin 1 gram SolR  8 g per day IV continuously     pen needle, diabetic 32 gauge x 5/32" Ndle  Use to inject insulin 5 (five) times daily.        CHANGE how you take these medications    insulin aspart U-100 100 unit/mL (3 mL) Inpn pen  Commonly known as: NovoLOG  Inject 14 Units into the skin 3 (three) times daily with meals. Plus Sliding Scale: 151-200: +1, 201-250: +2, 251-300: +3, 301-350: +4 and call MD  What changed:   · how much to take  · additional instructions     insulin detemir U-100 (Levemir) 100 unit/mL (3 mL) Inpn pen  Inject 24 Units into the skin 2 (two) times daily.  What changed: how much to take     warfarin 3 MG tablet  Commonly known as: COUMADIN  Take 1.5 tablets (4.5mg) orally daily, except 2 tablets (6mg) on Wednesdays and Saturdays  What changed:   · how much to take  · how to take this  · when to take this  · additional instructions  · Another medication with the same name was removed. Continue taking this medication, and follow the directions you see here.        CONTINUE taking these medications    busPIRone 10 MG tablet  Commonly known as: BUSPAR  Take 10 mg by mouth 2 (two) times daily.     furosemide 80 MG tablet  Commonly known as: LASIX  Take 1 tablet (80 mg total) by mouth once daily.     levETIRAcetam 1000 MG tablet  Commonly known as: KEPPRA  Take 1 tablet (1,000 mg total) by mouth 2 (two) times daily.     milrinone 20mg/100ml D5W (200mcg/ml) 20 mg/100 mL (200 mcg/mL) infusion  Commonly known as: PRIMACOR  Inject 34.875 mcg/min into the vein continuous.     mirtazapine 15 MG tablet  Commonly known as: REMERON  Take 1 tablet (15 mg " total) by mouth nightly.     TRUE METRIX GLUCOSE METER Misc  Generic drug: blood-glucose meter  Use as instructed     TRUE METRIX GLUCOSE TEST STRIP Strp  Generic drug: blood sugar diagnostic  Use to test blood glucose 4 (four) times daily.     TRUEPLUS LANCETS 33 gauge Misc  Generic drug: lancets  Use to test blood glucose 4 (four) times daily.     valsartan 40 MG tablet  Commonly known as: DIOVAN  Take 1 tablet (40 mg total) by mouth 2 (two) times daily.        STOP taking these medications    cyclobenzaprine 7.5 MG Tab  Commonly known as: FEXMID     doxycycline 100 MG Cap  Commonly known as: VIBRAMYCIN     INV ASPIRIN 100MG/PLACEBO     milrinone 1 mg/mL injection  Commonly known as: PRIMACOR     potassium chloride 20 mEq  Commonly known as: K-TAB     spironolactone 25 MG tablet  Commonly known as: ALDACTONE            DEYA Martinez  Heart Transplant  [facility]

## 2023-08-08 NOTE — ASSESSMENT & PLAN NOTE
-CT imaging this admission consistent with small left sided pleural effusion and complicated pleural around left heart border  -Pulm consulted- suspected para-pneumonic process vs empyema in setting of MSSA bacteremia  -Thoracic surgery consulted for VATS and patient not candidate  -IR consulted for CT guided chest tube placed in posterior collection 8/3  -pulm following and plan to administer intrapleural lytics x 3 days (started 8/4)  -Gram stain from pleural fluid + GPC.  Patient on Cefazolin  - Repeat CT 8/7/23 showed some improvement of loculated left pleural process, still with residual loculated paramediastinal fluid pockets that were not able to be reached with pleurolytics. Recommend continued antibiotic therapy per ID recommendations

## 2023-08-09 ENCOUNTER — PATIENT OUTREACH (OUTPATIENT)
Dept: ADMINISTRATIVE | Facility: CLINIC | Age: 38
End: 2023-08-09
Payer: MEDICAID

## 2023-08-09 NOTE — NURSING
Patient is ready for discharge. Patient stable alert and oriented.  No complaints of pain. Discussed discharge plan. Previous review of medications and side effects, appointments, and answered questions with patient and family. Pt left floor with spouse via wheelchair.

## 2023-08-09 NOTE — PROGRESS NOTES
C3 nurse spoke with Kevan Queen and Darlyn CATHERINE for a TCC post hospital discharge follow up call. The patient has a scheduled HOSFU appointment with Rosio Armendariz FNP on 8/25/23 @ 7:30.

## 2023-08-10 LAB
BACTERIA BLD CULT: NORMAL
BACTERIA BLD CULT: NORMAL
BACTERIA SPEC ANAEROBE CULT: NORMAL

## 2023-08-15 ENCOUNTER — ANTI-COAG VISIT (OUTPATIENT)
Dept: CARDIOLOGY | Facility: CLINIC | Age: 38
End: 2023-08-15
Payer: MEDICAID

## 2023-08-15 ENCOUNTER — HOSPITAL ENCOUNTER (EMERGENCY)
Facility: HOSPITAL | Age: 38
Discharge: HOME | End: 2023-08-15
Attending: EMERGENCY MEDICINE
Payer: MEDICAID

## 2023-08-15 VITALS
HEART RATE: 98 BPM | WEIGHT: 214 LBS | TEMPERATURE: 98 F | BODY MASS INDEX: 26.61 KG/M2 | RESPIRATION RATE: 18 BRPM | OXYGEN SATURATION: 99 % | HEIGHT: 75 IN

## 2023-08-15 DIAGNOSIS — Z95.828 S/P PICC CENTRAL LINE PLACEMENT: ICD-10-CM

## 2023-08-15 DIAGNOSIS — Z95.811 LVAD (LEFT VENTRICULAR ASSIST DEVICE) PRESENT: Primary | ICD-10-CM

## 2023-08-15 DIAGNOSIS — T82.524A DISPLACEMENT OF PERIPHERALLY INSERTED CENTRAL CATHETER (PICC): Primary | ICD-10-CM

## 2023-08-15 LAB
INR PPP: 3
PROTHROMBIN TIME: 31 SECONDS (ref 12.5–14.5)

## 2023-08-15 PROCEDURE — 36573 INSJ PICC RS&I 5 YR+: CPT | Mod: RT

## 2023-08-15 PROCEDURE — 99284 EMERGENCY DEPT VISIT MOD MDM: CPT

## 2023-08-15 PROCEDURE — 85610 PROTHROMBIN TIME: CPT | Performed by: EMERGENCY MEDICINE

## 2023-08-15 RX ORDER — SODIUM CHLORIDE 0.9 % (FLUSH) 0.9 %
10 SYRINGE (ML) INJECTION EVERY 6 HOURS
Status: DISCONTINUED | OUTPATIENT
Start: 2023-08-15 | End: 2023-08-15 | Stop reason: HOSPADM

## 2023-08-15 RX ORDER — SODIUM CHLORIDE 0.9 % (FLUSH) 0.9 %
10 SYRINGE (ML) INJECTION
Status: DISCONTINUED | OUTPATIENT
Start: 2023-08-15 | End: 2023-08-15 | Stop reason: HOSPADM

## 2023-08-15 NOTE — PROGRESS NOTES
INR at goal. Medications reviewed and no changes noted to necessitate warfarin adjustment.   See calendar.

## 2023-08-15 NOTE — ED PROVIDER NOTES
Encounter Date: 8/15/2023       History     Chief Complaint   Patient presents with    Vascular Access Problem     Presents to ED for a new PICC line. Patient states he was sleeping good, and when he woke up, his picc line was out. 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 LVAD patient. Recently d/c from [facility] on 8/8 for CAP and acute resp failure.      38-year-old male presents to the emergency department after accidentally dislodging his PICC line at home overnight.  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  Currently asymptomatic.    The history is provided by the patient and medical records.   Illness   The current episode started just prior to arrival. The problem occurs continuously. The problem has been unchanged. Nothing relieves the symptoms. Nothing aggravates the symptoms. Pertinent negatives include no fever and no shortness of breath.     Review of patient's allergies indicates:   Allergen Reactions    Bumex [bumetanide] Hives    Torsemide Hives     Past Medical History:   Diagnosis Date    Acute respiratory failure with hypoxia 7/22/2023    Arthritis     Awareness alteration, transient 9/1/2022    Cardiomyopathy     CHF (congestive heart failure) 10/01/2020    COVID-19 6/3/2023    Diabetes mellitus     Dilated cardiomyopathy 10/26/2020    Drug abuse 10/2020    Headache 4/19/2023    Hyperglycemia 12/16/2022    Hyperosmolar hyperglycemic state (HHS) 5/25/2022    ICD (implantable cardioverter-defibrillator) in place 10/26/2020    Left ventricular assist device (LVAD) complication, initial encounter 6/5/2023    Muscle cramping 6/15/2022    Renal disorder     SOB (shortness of breath) 6/13/2022    Tingling in extremities 7/13/2022     Past Surgical History:   Procedure Laterality Date    APPLICATION OF WOUND VACUUM-ASSISTED CLOSURE DEVICE N/A 6/30/2022    Procedure: APPLICATION, WOUND VAC;  Surgeon: Luis F Paige MD;  Location:  [facility] OR 2ND FLR;  Service: Cardiovascular;  Laterality: N/A;  50 x 5 cm    CARDIAC DEFIBRILLATOR PLACEMENT      IMPLANTATION OF RIGHT VENTRICULAR ASSIST DEVICE (RVAD) N/A 6/29/2022    Procedure: INSERTION, RVAD;  Surgeon: Luis F Paige MD;  Location: [facility] OR 2ND FLR;  Service: Cardiovascular;  Laterality: N/A;    INSERTION OF GRAFT TO PERICARDIUM Right 6/30/2022    Procedure: INSERTION-RIGHT VENTRICULAR ASSIST DEVICE;  Surgeon: Luis F Paige MD;  Location: [facility] OR [facility] FLR;  Service: Cardiovascular;  Laterality: Right;    IRRIGATION OF MEDIASTINUM  6/30/2022    Procedure: IRRIGATION, MEDIASTINUM;  Surgeon: Luis F Paige MD;  Location: [facility] OR [facility] FLR;  Service: Cardiovascular;;    LEFT VENTRICULAR ASSIST DEVICE Left 6/23/2022    Procedure: INSERTION-LEFT VENTRICULAR ASSIST DEVICE;  Surgeon: Luis F Paige MD;  Location: [facility] OR [facility] FLR;  Service: Cardiovascular;  Laterality: Left;    LEFT VENTRICULAR ASSIST DEVICE N/A 6/29/2022    Procedure: INSERTION-LEFT VENTRICULAR ASSIST DEVICE;  Surgeon: Luis F Paige MD;  [facility];  Service: Cardiovascular;  Laterality: N/A;    RIGHT HEART CATHETERIZATION Right 4/8/2022    Procedure: INSERTION, CATHETER, RIGHT HEART;  Surgeon: Luca Lopez Jr., MD;  Location: [facility] CATH LAB;  Service: Cardiology;  Laterality: Right;    RIGHT HEART CATHETERIZATION Right 4/19/2022    Procedure: INSERTION, CATHETER, RIGHT HEART;  Surgeon: Josh Pulido MD;  Location: [facility] CATH LAB;  Service: Cardiology;  Laterality: Right;    RIGHT HEART CATHETERIZATION Right 7/21/2022    Procedure: INSERTION, CATHETER, RIGHT HEART;  Surgeon: Dalia Crum MD;  Location: [facility] CATH LAB;  Service: Cardiology;  Laterality: Right;    RIGHT HEART CATHETERIZATION Right 10/31/2022    Procedure: INSERTION, CATHETER, RIGHT HEART;  Surgeon: Dalia Crum MD;  Location: [facility] CATH LAB;  Service: Cardiology;  Laterality: Right;    STERNAL WOUND CLOSURE N/A 6/30/2022    Procedure:  CLOSURE, WOUND, STERNUM;  Surgeon: Luis F Paige MD;  Location: [facility] OR [address] [place] [address];  Service: Cardiovascular;  Laterality: N/A;     Family History   Problem Relation Age of Onset    Heart failure Father     Diverticulosis Brother     Heart attack Maternal Grandmother     Heart attack Maternal Grandfather      Social History     Tobacco Use    Smoking status: Former     Current packs/day: 0.00     Average packs/day: 0.5 packs/day for 16.6 years (8.3 ttl pk-yrs)     Types: Cigarettes     Start date: 10/1/2004     Quit date: 2021     Years since quittin.3    Smokeless tobacco: Never   Substance Use Topics    Alcohol use: Not Currently    Drug use: Not Currently     Types: Marijuana, MDMA (Ecstacy)     Review of Systems   Constitutional:  Negative for fever.   Respiratory:  Negative for chest tightness and shortness of breath.        Physical Exam     Initial Vitals [08/15/23 0803]   BP Pulse Resp Temp SpO2   -- 88 20 98.4 °F (36.9 °C) 95 %      MAP       --         Physical Exam    Nursing note and vitals reviewed.  Constitutional: He appears well-developed and well-nourished. No distress.   HENT:   Head: Normocephalic and atraumatic.   Eyes: No scleral icterus.   Cardiovascular:             RUE PICC site w/ clean dressing in place  LVAD in place   Pulmonary/Chest: No respiratory distress.   + precordial venous hum     Neurological: He is alert and oriented to person, place, and time.   Skin: Skin is warm and dry. No rash noted.         ED Course   Procedures  Labs Reviewed   PROTIME-INR - Abnormal; Notable for the following components:       Result Value    PT 31.0 (*)     INR 3.0 (*)     All other components within normal limits                 Medications - No data to display   Medical Decision Making  Problems Addressed:  Displacement of peripherally inserted central catheter (PICC): acute illness or injury    Amount and/or Complexity of Data Reviewed  Labs: ordered.  Radiology: ordered.      ED assessment:     Mr. Queen has a history of LVAD, line associated bacteremia, line exchange but currently on IV antibiotics via right upper extremity PICC.  Accidentally displaced overnight by his report.  Asymptomatic at this time.  Afebrile, nontoxic appearing.  Requesting PICC be replaced.    Differential diagnosis (including but not limited to):   Dislodged PICC line    ED management:   INR therapeutic.  PICC team contacted.  Will have line replaced and then discharge home.  Instructed to keep all future follow-up appointments as previously scheduled.    My independent radiology interpretation:   CXR: tip of PICC in the SVC    Amount and/or Complexity of Data Reviewed  Independent historian: none   Summary of history:   External data reviewed: notes from previous admissions  Summary of data reviewed:  Recently admitted in [place] with fever up to 102, started on broad space with pain to, grew gram-positive cocci, discharged with plan for 6 weeks of IV cefazolin in addition to Milrinone therapy.  Will be completed on 09/14/2023.  Risk and benefits of testing: discussed   Labs: ordered and reviewed  Radiology: ordered and independent interpretation performed (see above or ED course)    Risk  Shared decision making     Critical Care  none    I, Almita Ruiz MD personally performed the history, PE, MDM, and procedures as documented above and agree with the scribe's documentation.              ED Course as of 08/15/23 2310   Tue Aug 15, 2023   0946 PICC nurse notified of INR, will be en route to replace PICC line. [KS]      ED Course User Index  [KS] Almita Ruiz MD                 Clinical Impression:   Final diagnoses:  [T82.524A] Displacement of peripherally inserted central catheter (PICC) (Primary)  [Z95.828] S/P PICC central line placement        ED Disposition Condition    Discharge Stable          ED Prescriptions    None       Follow-up Information       Follow up With Specialties Details Why Contact Info     Rosio Armendariz, FNP Nurse Practitioner  As needed [address] [facility] [address]  [phone]      [facility] - Emergency Dept Emergency Medicine  As needed, If symptoms worsen [address] [facility] [phone]             Almita Ruiz MD  08/15/23 0238

## 2023-08-16 ENCOUNTER — TELEPHONE (OUTPATIENT)
Dept: TRANSPLANT | Facility: CLINIC | Age: 38
End: 2023-08-16
Payer: MEDICAID

## 2023-08-16 NOTE — TELEPHONE ENCOUNTER
Per [employer] patient did not show to appointment on yesterday for lab draw. Is now rescheduled for 8/22

## 2023-08-22 NOTE — PROGRESS NOTES
Kadeem from [facility] pt was a lab no show 8/21,  S/W pt girlfriend Darlyn Wright she r/s missed labs to 8/24/23 at [facility] .

## 2023-08-23 ENCOUNTER — OFFICE VISIT (OUTPATIENT)
Dept: INFECTIOUS DISEASES | Facility: CLINIC | Age: 38
End: 2023-08-23
Payer: MEDICAID

## 2023-08-23 DIAGNOSIS — T82.7XXA INFECTION ASSOCIATED WITH DRIVELINE OF LEFT VENTRICULAR ASSIST DEVICE (LVAD): Primary | ICD-10-CM

## 2023-08-23 DIAGNOSIS — R78.81 BACTEREMIA DUE TO STAPHYLOCOCCUS: ICD-10-CM

## 2023-08-23 DIAGNOSIS — Z79.2 ANTIBIOTIC LONG-TERM USE: ICD-10-CM

## 2023-08-23 DIAGNOSIS — Z79.2 RECEIVING INTRAVENOUS ANTIBIOTIC TREATMENT AS OUTPATIENT: ICD-10-CM

## 2023-08-23 DIAGNOSIS — B95.8 BACTEREMIA DUE TO STAPHYLOCOCCUS: ICD-10-CM

## 2023-08-23 DIAGNOSIS — J86.9 EMPYEMA OF LUNG: ICD-10-CM

## 2023-08-23 PROCEDURE — 3044F PR MOST RECENT HEMOGLOBIN A1C LEVEL <7.0%: ICD-10-PCS | Mod: CPTII,95,, | Performed by: STUDENT IN AN ORGANIZED HEALTH CARE EDUCATION/TRAINING PROGRAM

## 2023-08-23 PROCEDURE — 1111F PR DISCHARGE MEDS RECONCILED W/ CURRENT OUTPATIENT MED LIST: ICD-10-PCS | Mod: CPTII,95,, | Performed by: STUDENT IN AN ORGANIZED HEALTH CARE EDUCATION/TRAINING PROGRAM

## 2023-08-23 PROCEDURE — 99214 OFFICE O/P EST MOD 30 MIN: CPT | Mod: 95,,, | Performed by: STUDENT IN AN ORGANIZED HEALTH CARE EDUCATION/TRAINING PROGRAM

## 2023-08-23 PROCEDURE — 4010F ACE/ARB THERAPY RXD/TAKEN: CPT | Mod: CPTII,95,, | Performed by: STUDENT IN AN ORGANIZED HEALTH CARE EDUCATION/TRAINING PROGRAM

## 2023-08-23 PROCEDURE — 3066F NEPHROPATHY DOC TX: CPT | Mod: CPTII,95,, | Performed by: STUDENT IN AN ORGANIZED HEALTH CARE EDUCATION/TRAINING PROGRAM

## 2023-08-23 PROCEDURE — 1159F MED LIST DOCD IN RCRD: CPT | Mod: CPTII,95,, | Performed by: STUDENT IN AN ORGANIZED HEALTH CARE EDUCATION/TRAINING PROGRAM

## 2023-08-23 PROCEDURE — 99214 PR OFFICE/OUTPT VISIT, EST, LEVL IV, 30-39 MIN: ICD-10-PCS | Mod: 95,,, | Performed by: STUDENT IN AN ORGANIZED HEALTH CARE EDUCATION/TRAINING PROGRAM

## 2023-08-23 PROCEDURE — 3060F PR POS MICROALBUMINURIA RESULT DOCUMENTED/REVIEW: ICD-10-PCS | Mod: CPTII,95,, | Performed by: STUDENT IN AN ORGANIZED HEALTH CARE EDUCATION/TRAINING PROGRAM

## 2023-08-23 PROCEDURE — 1160F RVW MEDS BY RX/DR IN RCRD: CPT | Mod: CPTII,95,, | Performed by: STUDENT IN AN ORGANIZED HEALTH CARE EDUCATION/TRAINING PROGRAM

## 2023-08-23 PROCEDURE — 1111F DSCHRG MED/CURRENT MED MERGE: CPT | Mod: CPTII,95,, | Performed by: STUDENT IN AN ORGANIZED HEALTH CARE EDUCATION/TRAINING PROGRAM

## 2023-08-23 PROCEDURE — 3066F PR DOCUMENTATION OF TREATMENT FOR NEPHROPATHY: ICD-10-PCS | Mod: CPTII,95,, | Performed by: STUDENT IN AN ORGANIZED HEALTH CARE EDUCATION/TRAINING PROGRAM

## 2023-08-23 PROCEDURE — 1159F PR MEDICATION LIST DOCUMENTED IN MEDICAL RECORD: ICD-10-PCS | Mod: CPTII,95,, | Performed by: STUDENT IN AN ORGANIZED HEALTH CARE EDUCATION/TRAINING PROGRAM

## 2023-08-23 PROCEDURE — 3060F POS MICROALBUMINURIA REV: CPT | Mod: CPTII,95,, | Performed by: STUDENT IN AN ORGANIZED HEALTH CARE EDUCATION/TRAINING PROGRAM

## 2023-08-23 PROCEDURE — 3044F HG A1C LEVEL LT 7.0%: CPT | Mod: CPTII,95,, | Performed by: STUDENT IN AN ORGANIZED HEALTH CARE EDUCATION/TRAINING PROGRAM

## 2023-08-23 PROCEDURE — 1160F PR REVIEW ALL MEDS BY PRESCRIBER/CLIN PHARMACIST DOCUMENTED: ICD-10-PCS | Mod: CPTII,95,, | Performed by: STUDENT IN AN ORGANIZED HEALTH CARE EDUCATION/TRAINING PROGRAM

## 2023-08-23 PROCEDURE — 4010F PR ACE/ARB THEARPY RXD/TAKEN: ICD-10-PCS | Mod: CPTII,95,, | Performed by: STUDENT IN AN ORGANIZED HEALTH CARE EDUCATION/TRAINING PROGRAM

## 2023-08-23 NOTE — PROGRESS NOTES
The patient location is: [place]  The chief complaint leading to consultation is: f/u    Visit type: audiovisual    Face to Face time with patient: 7  30 minutes of total time spent on the encounter, which includes face to face time and non-face to face time preparing to see the patient (eg, review of tests), Obtaining and/or reviewing separately obtained history, Documenting clinical information in the electronic or other health record, Independently interpreting results (not separately reported) and communicating results to the patient/family/caregiver, or Care coordination (not separately reported).         Each patient to whom he or she provides medical services by telemedicine is:  (1) informed of the relationship between the physician and patient and the respective role of any other health care provider with respect to management of the patient; and (2) notified that he or she may decline to receive medical services by telemedicine and may withdraw from such care at any time.    Notes:       INFECTIOUS DISEASE CLINIC  08/23/2023     Subjective:      Chief Complaint:   Chief Complaint   Patient presents with    Follow-up       History of Present Illness:    This is a 38 y.o. male with LVAD c/b prior MSSA DLESI with recent admission for COVID, DLESI and MSSA bacteremia c/b L empyema (maintained on ancef, maria isabel 9/14) who is referred to my clinic for follow up.  Patient known to ID, please see prior notes for full details. Doing well since discharge, reports tolerating ancef without issues. PICC line inadvertently was pulled a couple of weeks ago and went to ER to get it replaced with CXR done at that time with interval improvement in L sided opacity.  Denies fevers, chills, drainage from DLES or diarrhea. Reports breathing is better, still with minimal L sided pleuritic chest pain.           ROS      Past Medical History:   Diagnosis Date    Acute respiratory failure with hypoxia 7/22/2023    Arthritis     Awareness  alteration, transient 9/1/2022    Cardiomyopathy     CHF (congestive heart failure) 10/01/2020    COVID-19 6/3/2023    Diabetes mellitus     Dilated cardiomyopathy 10/26/2020    Drug abuse 10/2020    Headache 4/19/2023    Hyperglycemia 12/16/2022    Hyperosmolar hyperglycemic state (HHS) 5/25/2022    ICD (implantable cardioverter-defibrillator) in place 10/26/2020    Left ventricular assist device (LVAD) complication, initial encounter 6/5/2023    Muscle cramping 6/15/2022    Renal disorder     SOB (shortness of breath) 6/13/2022    Tingling in extremities 7/13/2022     Past Surgical History:   Procedure Laterality Date    APPLICATION OF WOUND VACUUM-ASSISTED CLOSURE DEVICE N/A 6/30/2022    Procedure: APPLICATION, WOUND VAC;  Surgeon: Luis F Paige MD;  Location: [facility] OR 2ND FLR;  Service: Cardiovascular;  Laterality: N/A;  50 x 5 cm    CARDIAC DEFIBRILLATOR PLACEMENT      IMPLANTATION OF RIGHT VENTRICULAR ASSIST DEVICE (RVAD) N/A 6/29/2022    Procedure: INSERTION, RVAD;  Surgeon: Luis F Paige MD;  Location: [facility] OR 2ND FLR;  Service: Cardiovascular;  Laterality: N/A;    INSERTION OF GRAFT TO PERICARDIUM Right 6/30/2022    Procedure: INSERTION-RIGHT VENTRICULAR ASSIST DEVICE;  Surgeon: Luis F Paige MD;  Location: [facility] OR 2ND FLR;  Service: Cardiovascular;  Laterality: Right;    IRRIGATION OF MEDIASTINUM  6/30/2022    Procedure: IRRIGATION, MEDIASTINUM;  Surgeon: Luis F Paige MD;  Location: [facility] OR 2ND FLR;  Service: Cardiovascular;;    LEFT VENTRICULAR ASSIST DEVICE Left 6/23/2022    Procedure: INSERTION-LEFT VENTRICULAR ASSIST DEVICE;  Surgeon: Lui sF Paige MD;  Location: [facility] OR 2ND FLR;  Service: Cardiovascular;  Laterality: Left;    LEFT VENTRICULAR ASSIST DEVICE N/A 6/29/2022    Procedure: INSERTION-LEFT VENTRICULAR ASSIST DEVICE;  Surgeon: Luis F Paige MD;  Location: [facility] OR 2ND FLR;  Service: Cardiovascular;  Laterality: N/A;    RIGHT HEART CATHETERIZATION Right 4/8/2022    Procedure: INSERTION,  CATHETER, RIGHT HEART;  Surgeon: Luca Lopez Jr., MD;  Location: [facility] CATH LAB;  Service: Cardiology;  Laterality: Right;    RIGHT HEART CATHETERIZATION Right 2022    Procedure: INSERTION, CATHETER, RIGHT HEART;  Surgeon: Josh Pulido MD;  Location: [facility] CATH LAB;  Service: Cardiology;  Laterality: Right;    RIGHT HEART CATHETERIZATION Right 2022    Procedure: INSERTION, CATHETER, RIGHT HEART;  Surgeon: Dalia Crum MD;  Location: [facility] CATH LAB;  Service: Cardiology;  Laterality: Right;    RIGHT HEART CATHETERIZATION Right 10/31/2022    Procedure: INSERTION, CATHETER, RIGHT HEART;  Surgeon: Dalia Crum MD;  Location: [facility] CATH LAB;  Service: Cardiology;  Laterality: Right;    STERNAL WOUND CLOSURE N/A 2022    Procedure: CLOSURE, WOUND, STERNUM;  Surgeon: Luis F Paige MD;  Location: [facility] OR [address] [place] [address];  Service: Cardiovascular;  Laterality: N/A;     Family History   Problem Relation Age of Onset    Heart failure Father     Diverticulosis Brother     Heart attack Maternal Grandmother     Heart attack Maternal Grandfather      Social History     Tobacco Use    Smoking status: Former     Current packs/day: 0.00     Average packs/day: 0.5 packs/day for 16.6 years (8.3 ttl pk-yrs)     Types: Cigarettes     Start date: 10/1/2004     Quit date: 2021     Years since quittin.3    Smokeless tobacco: Never   Substance Use Topics    Alcohol use: Not Currently    Drug use: Not Currently     Types: Marijuana, MDMA (Ecstacy)       Review of patient's allergies indicates:   Allergen Reactions    Bumex [bumetanide] Hives    Torsemide Hives         Objective:   VS (24h): There were no vitals filed for this visit.      Physical Exam  Constitutional:       General: He is not in acute distress.     Appearance: He is not ill-appearing.   HENT:      Head: Normocephalic and atraumatic.   Eyes:      General: No scleral icterus.        Right eye: No discharge.         Left eye: No discharge.    Pulmonary:      Effort: Pulmonary effort is normal.   Skin:     Findings: No bruising.   Neurological:      Mental Status: He is alert and oriented to person, place, and time.             Labs: none recent; reviewed available lab work from 8/8 - no leukocytosis noted      Micro:   8/5 blcx ngtd  8/3 pleural cx ngtd  7/28 blcx MSSA    Radiology:   CXR reviewed - interval improvement in L opacity    Immunization History   Administered Date(s) Administered    COVID-19, MRNA, LN-S, PF (Pfizer) (Purple Cap) 12/08/2021, 12/29/2021    DTP 1985, 1985, 1985, 06/01/1987    HIB 02/05/1990    Influenza - Quadrivalent - PF *Preferred* (6 months and older) 10/16/2020, 09/24/2021, 12/16/2022    MMR 06/01/1987    OPV 1985, 1985, 06/01/1987         Assessment:     1. 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 Bacteremia due to Staphylococcus    3. Receiving intravenous antibiotic treatment as outpatient    4. Antibiotic long-term use    5. Empyema of lung        38 y.o. male with LVAD c/b prior MSSA DLESI with recent admission for COVID, DLESI and MSSA bacteremia c/b L empyema s/p chest tube placement (maintained on 6w ancef, maria isabel 9/14) who is referred to my clinic for follow up. Doing well since discharge, denies symptoms of ongoing infection. Tolerating ancef and denies problems with PICC. Recent CXR from 8/8 with interval improvement in L sided opacity.     Plan:     -continue ancef x 6w, maria isabel 9/14   -will discuss SAT at next visit  -weekly lab work (CBC/CMP)/dressing changes; pt states he is going tomorrow to get labs drawn with bioscript  -f/u with VAD clinic         Follow up in 3 weeks for EOC    Management of bacteremia/empyema were discussed with patient. Patient was given ample time for questions, all questions answered. Strict return precautions given to patient.         Laura Baldwin MD  Infectious Disease

## 2023-08-24 LAB — INR PPP: 1.7

## 2023-08-25 ENCOUNTER — TELEPHONE (OUTPATIENT)
Dept: TRANSPLANT | Facility: CLINIC | Age: 38
End: 2023-08-25
Payer: MEDICAID

## 2023-08-25 ENCOUNTER — DOCUMENTATION ONLY (OUTPATIENT)
Dept: TRANSPLANT | Facility: CLINIC | Age: 38
End: 2023-08-25
Payer: MEDICAID

## 2023-08-25 LAB
EXT ALBUMIN: 3.3
EXT ALT: <3
EXT AST: 14
EXT BILIRUBIN TOTAL: 0.6
EXT BUN: 7
EXT CALCIUM: 8.1
EXT CHLORIDE: 102
EXT CREATININE: 1.24 MG/DL
EXT GLUCOSE: 189
EXT HEMATOCRIT: 30.6
EXT HEMOGLOBIN: 9.5
EXT MAGNESIUM: 1.3
EXT PLATELETS: 368
EXT POTASSIUM: 3.3
EXT PROTEIN TOTAL: 7.1
EXT SODIUM: 139 MMOL/L
EXT WBC: 9.6

## 2023-08-25 NOTE — TELEPHONE ENCOUNTER
Robyn called in regards to patient's medication. She states that he needs a refill on his Keppra and half of his medication is at [facility] instead of [facility] in [place]. I informed Robyn to call [facility] pharmacy to transfer patient's medications to [facility] in [place] and to call Neurology for patient's Keppra refill. Robyn verbalized understanding.

## 2023-08-25 NOTE — PROGRESS NOTES
Patient's labs entered and reviewed, K slightly low, patient's phone number not in service unable to contact. Will continue to try and contact patient.

## 2023-08-28 ENCOUNTER — TELEPHONE (OUTPATIENT)
Dept: TRANSPLANT | Facility: CLINIC | Age: 38
End: 2023-08-28
Payer: MEDICAID

## 2023-08-28 ENCOUNTER — ANTI-COAG VISIT (OUTPATIENT)
Dept: CARDIOLOGY | Facility: CLINIC | Age: 38
End: 2023-08-28
Payer: MEDICAID

## 2023-08-28 DIAGNOSIS — Z95.811 LVAD (LEFT VENTRICULAR ASSIST DEVICE) PRESENT: Primary | ICD-10-CM

## 2023-08-28 PROCEDURE — 93793 PR ANTICOAGULANT MGMT FOR PT TAKING WARFARIN: ICD-10-PCS | Mod: ,,,

## 2023-08-28 PROCEDURE — 93793 ANTICOAG MGMT PT WARFARIN: CPT | Mod: ,,,

## 2023-08-28 NOTE — PROGRESS NOTES
Spoke with patient girlfriend Darlyn Wright regarding recent INR results .  Patient questioned and verified correct dosage . Reported kale intake last week . Will be going back to [facility] on Thursday - denies any other changes.

## 2023-08-28 NOTE — PROGRESS NOTES
INR not at goal, and of note it is from 8/24 . Medications, chart, and patient findings reviewed. Patient reports the following changes since their last visit: recent kale intake could be contributing to decreased INR. See calendar for adjustments to dose and follow up plan.

## 2023-08-28 NOTE — TELEPHONE ENCOUNTER
"Robyn called in with concerns that Kevan is swollen face, hands, legs, and feet since last week.I informed the VC's and they recommend that he goes to the ER. Robyn also asked about his needs to psych or palliative because of his attitude and mood swings. I have informed he that I can too past that along to the VC's and see if an appt can be made when he's here for VAD clinic. After letting Robyn know that the VC's recommend taking Kevan to the ER she's now saying the car is broken and she can't bring him to the ER. Then mysteriously he walked outside just now as we were talking and the swelling is gone. He is taking 80 lasix daily per Robyn but that prescription  on 23. I think she's scared because she sounded concerned then I heard "hold on he's walking out the house now". Her tone changed and she said "oh, I think the swelling is going down" followed by "okay, okay thank you" as if she couldn't talk anymore. I told her that if she notices more swelling and can find a ride please do as the VC's said and take him to the ER. She said okay and hung up.  "

## 2023-08-29 NOTE — ASSESSMENT & PLAN NOTE
-H/H stable    Rituxan Pregnancy And Lactation Text: This medication is Pregnancy Category C and it isn't know if it is safe during pregnancy. It is unknown if this medication is excreted in breast milk but similar antibodies are known to be excreted.

## 2023-08-31 ENCOUNTER — HOSPITAL ENCOUNTER (INPATIENT)
Facility: HOSPITAL | Age: 38
LOS: 11 days | Discharge: HOME | DRG: 064 | End: 2023-09-11
Attending: INTERNAL MEDICINE | Admitting: INTERNAL MEDICINE
Payer: MEDICAID

## 2023-08-31 ENCOUNTER — DOCUMENTATION ONLY (OUTPATIENT)
Dept: NEUROLOGY | Facility: CLINIC | Age: 38
End: 2023-08-31
Payer: MEDICAID

## 2023-08-31 ENCOUNTER — ANESTHESIA EVENT (OUTPATIENT)
Dept: INTENSIVE CARE | Facility: HOSPITAL | Age: 38
DRG: 064 | End: 2023-08-31
Payer: MEDICAID

## 2023-08-31 ENCOUNTER — ANESTHESIA (OUTPATIENT)
Dept: INTENSIVE CARE | Facility: HOSPITAL | Age: 38
DRG: 064 | End: 2023-08-31
Payer: MEDICAID

## 2023-08-31 ENCOUNTER — HOSPITAL ENCOUNTER (EMERGENCY)
Facility: HOSPITAL | Age: 38
Discharge: ANOTHER HOSPITAL | End: 2023-08-31
Attending: STUDENT IN AN ORGANIZED HEALTH CARE EDUCATION/TRAINING PROGRAM
Payer: MEDICAID

## 2023-08-31 VITALS
SYSTOLIC BLOOD PRESSURE: 118 MMHG | WEIGHT: 210.13 LBS | TEMPERATURE: 98 F | RESPIRATION RATE: 18 BRPM | HEART RATE: 127 BPM | DIASTOLIC BLOOD PRESSURE: 78 MMHG | OXYGEN SATURATION: 92 % | BODY MASS INDEX: 26.26 KG/M2

## 2023-08-31 DIAGNOSIS — I63.9 ACUTE CVA (CEREBROVASCULAR ACCIDENT): Primary | ICD-10-CM

## 2023-08-31 DIAGNOSIS — I63.412 EMBOLIC STROKE INVOLVING LEFT MIDDLE CEREBRAL ARTERY: ICD-10-CM

## 2023-08-31 DIAGNOSIS — Z79.4 TYPE 2 DIABETES MELLITUS WITH HYPERGLYCEMIA, WITH LONG-TERM CURRENT USE OF INSULIN: Primary | ICD-10-CM

## 2023-08-31 DIAGNOSIS — Z95.811 LVAD (LEFT VENTRICULAR ASSIST DEVICE) PRESENT: ICD-10-CM

## 2023-08-31 DIAGNOSIS — E11.65 TYPE 2 DIABETES MELLITUS WITH HYPERGLYCEMIA, WITH LONG-TERM CURRENT USE OF INSULIN: Primary | ICD-10-CM

## 2023-08-31 DIAGNOSIS — I63.9 CVA (CEREBRAL VASCULAR ACCIDENT): ICD-10-CM

## 2023-08-31 DIAGNOSIS — I63.9 STROKE: ICD-10-CM

## 2023-08-31 DIAGNOSIS — Z91.148 NONCOMPLIANCE WITH MEDICATION REGIMEN: ICD-10-CM

## 2023-08-31 DIAGNOSIS — I50.9 CHF (CONGESTIVE HEART FAILURE): ICD-10-CM

## 2023-08-31 DIAGNOSIS — A41.9 SEVERE SEPSIS: ICD-10-CM

## 2023-08-31 DIAGNOSIS — R65.20 SEVERE SEPSIS: ICD-10-CM

## 2023-08-31 DIAGNOSIS — I50.41 ACUTE COMBINED SYSTOLIC AND DIASTOLIC CONGESTIVE HEART FAILURE: ICD-10-CM

## 2023-08-31 DIAGNOSIS — R53.1 RIGHT SIDED WEAKNESS: ICD-10-CM

## 2023-08-31 DIAGNOSIS — E11.65 TYPE 2 DIABETES MELLITUS WITH HYPERGLYCEMIA, UNSPECIFIED WHETHER LONG TERM INSULIN USE: ICD-10-CM

## 2023-08-31 DIAGNOSIS — R47.01 APHASIA: ICD-10-CM

## 2023-08-31 PROBLEM — Z98.890 REQUIRED EMERGENT INTUBATION: Status: ACTIVE | Noted: 2023-08-31

## 2023-08-31 LAB
ALBUMIN SERPL BCP-MCNC: 2.8 G/DL (ref 3.5–5.2)
ALBUMIN SERPL BCP-MCNC: 2.9 G/DL (ref 3.5–5.2)
ALBUMIN SERPL-MCNC: 3.1 G/DL (ref 3.5–5)
ALBUMIN/GLOB SERPL: 0.6 RATIO (ref 1.1–2)
ALLENS TEST: ABNORMAL
ALP SERPL-CCNC: 101 U/L (ref 55–135)
ALP SERPL-CCNC: 114 UNIT/L (ref 40–150)
ALP SERPL-CCNC: 95 U/L (ref 55–135)
ALT SERPL W/O P-5'-P-CCNC: <5 U/L (ref 10–44)
ALT SERPL W/O P-5'-P-CCNC: <5 U/L (ref 10–44)
ALT SERPL-CCNC: <5 UNIT/L (ref 0–55)
AMPHET+METHAMPHET UR QL: NEGATIVE
ANION GAP SERPL CALC-SCNC: 10 MMOL/L (ref 8–16)
ANION GAP SERPL CALC-SCNC: 11 MMOL/L (ref 8–16)
ANION GAP SERPL CALC-SCNC: 12 MMOL/L (ref 8–16)
ANION GAP SERPL CALC-SCNC: 17 MMOL/L (ref 8–16)
AST SERPL-CCNC: 21 U/L (ref 10–40)
AST SERPL-CCNC: 22 U/L (ref 10–40)
AST SERPL-CCNC: 29 UNIT/L (ref 5–34)
BARBITURATES UR QL SCN>200 NG/ML: NEGATIVE
BASOPHILS # BLD AUTO: 0.01 K/UL (ref 0–0.2)
BASOPHILS # BLD AUTO: 0.03 X10(3)/MCL
BASOPHILS NFR BLD AUTO: 0.3 %
BASOPHILS NFR BLD: 0.1 % (ref 0–1.9)
BENZODIAZ UR QL SCN>200 NG/ML: NEGATIVE
BILIRUB SERPL-MCNC: 0.7 MG/DL (ref 0.1–1)
BILIRUB SERPL-MCNC: 0.7 MG/DL (ref 0.1–1)
BILIRUB SERPL-MCNC: 0.8 MG/DL
BNP SERPL-MCNC: 924 PG/ML (ref 0–99)
BUN SERPL-MCNC: 7 MG/DL (ref 6–30)
BUN SERPL-MCNC: 8 MG/DL (ref 6–20)
BUN SERPL-MCNC: 8.1 MG/DL (ref 8.9–20.6)
BZE UR QL SCN: NEGATIVE
CALCIUM SERPL-MCNC: 8.2 MG/DL (ref 8.7–10.5)
CALCIUM SERPL-MCNC: 8.3 MG/DL (ref 8.4–10.2)
CALCIUM SERPL-MCNC: 8.3 MG/DL (ref 8.7–10.5)
CALCIUM SERPL-MCNC: 8.5 MG/DL (ref 8.7–10.5)
CANNABINOIDS UR QL SCN: NEGATIVE
CHLORIDE SERPL-SCNC: 100 MMOL/L (ref 95–110)
CHLORIDE SERPL-SCNC: 96 MMOL/L (ref 95–110)
CHLORIDE SERPL-SCNC: 97 MMOL/L (ref 98–107)
CHOLEST SERPL-MCNC: 118 MG/DL (ref 120–199)
CHOLEST SERPL-MCNC: 147 MG/DL
CHOLEST/HDLC SERPL: 3.2 {RATIO} (ref 2–5)
CHOLEST/HDLC SERPL: 4 {RATIO} (ref 0–5)
CO2 SERPL-SCNC: 26 MMOL/L (ref 23–29)
CO2 SERPL-SCNC: 28 MMOL/L (ref 23–29)
CO2 SERPL-SCNC: 29 MMOL/L (ref 22–29)
CO2 SERPL-SCNC: 30 MMOL/L (ref 23–29)
CREAT SERPL-MCNC: 1.1 MG/DL (ref 0.5–1.4)
CREAT SERPL-MCNC: 1.2 MG/DL (ref 0.5–1.4)
CREAT SERPL-MCNC: 1.37 MG/DL (ref 0.73–1.18)
CREAT UR-MCNC: 9 MG/DL (ref 23–375)
DELSYS: ABNORMAL
DIFFERENTIAL METHOD: ABNORMAL
EOSINOPHIL # BLD AUTO: 0 K/UL (ref 0–0.5)
EOSINOPHIL # BLD AUTO: 0.04 X10(3)/MCL (ref 0–0.9)
EOSINOPHIL NFR BLD AUTO: 0.3 %
EOSINOPHIL NFR BLD: 0 % (ref 0–8)
ERYTHROCYTE [DISTWIDTH] IN BLOOD BY AUTOMATED COUNT: 19.8 % (ref 11.5–14.5)
ERYTHROCYTE [DISTWIDTH] IN BLOOD BY AUTOMATED COUNT: 20.2 % (ref 11.5–17)
ERYTHROCYTE [SEDIMENTATION RATE] IN BLOOD BY WESTERGREN METHOD: 15 MM/H
ERYTHROCYTE [SEDIMENTATION RATE] IN BLOOD BY WESTERGREN METHOD: 20 MM/H
ERYTHROCYTE [SEDIMENTATION RATE] IN BLOOD BY WESTERGREN METHOD: 20 MM/H
EST. GFR  (NO RACE VARIABLE): >60 ML/MIN/1.73 M^2
ESTIMATED AVG GLUCOSE: 151 MG/DL (ref 68–131)
ETHANOL UR-MCNC: <10 MG/DL
FIO2: 100
FIO2: 100
FIO2: 50
GFR SERPLBLD CREATININE-BSD FMLA CKD-EPI: >60 MLS/MIN/1.73/M2
GLOBULIN SER-MCNC: 5.2 GM/DL (ref 2.4–3.5)
GLUCOSE SERPL-MCNC: 142 MG/DL (ref 70–110)
GLUCOSE SERPL-MCNC: 198 MG/DL (ref 70–110)
GLUCOSE SERPL-MCNC: 203 MG/DL (ref 70–110)
GLUCOSE SERPL-MCNC: 227 MG/DL (ref 74–100)
GLUCOSE SERPL-MCNC: 231 MG/DL (ref 70–110)
HBA1C MFR BLD: 6.9 % (ref 4–5.6)
HCO3 UR-SCNC: 28.3 MMOL/L (ref 24–28)
HCO3 UR-SCNC: 33.5 MMOL/L (ref 24–28)
HCT VFR BLD AUTO: 31.7 % (ref 40–54)
HCT VFR BLD AUTO: 36.9 % (ref 42–52)
HCT VFR BLD CALC: 43 %PCV (ref 36–54)
HDLC SERPL-MCNC: 37 MG/DL (ref 40–75)
HDLC SERPL-MCNC: 42 MG/DL (ref 35–60)
HDLC SERPL: 31.4 % (ref 20–50)
HGB BLD-MCNC: 10.1 G/DL (ref 14–18)
HGB BLD-MCNC: 11.6 G/DL (ref 14–18)
HGB BLD-MCNC: 15 G/DL
IMM GRANULOCYTES # BLD AUTO: 0.03 X10(3)/MCL (ref 0–0.04)
IMM GRANULOCYTES # BLD AUTO: 0.05 K/UL (ref 0–0.04)
IMM GRANULOCYTES NFR BLD AUTO: 0.3 %
IMM GRANULOCYTES NFR BLD AUTO: 0.4 % (ref 0–0.5)
INR PPP: 1.2
INR PPP: 1.3 (ref 0.8–1.2)
LDLC SERPL CALC-MCNC: 63.2 MG/DL (ref 63–159)
LDLC SERPL CALC-MCNC: 81 MG/DL (ref 50–140)
LYMPHOCYTES # BLD AUTO: 0.5 K/UL (ref 1–4.8)
LYMPHOCYTES # BLD AUTO: 1.35 X10(3)/MCL (ref 0.6–4.6)
LYMPHOCYTES NFR BLD AUTO: 11.7 %
LYMPHOCYTES NFR BLD: 3.7 % (ref 18–48)
MAGNESIUM SERPL-MCNC: 1.3 MG/DL (ref 1.6–2.6)
MCH RBC QN AUTO: 28.4 PG (ref 27–31)
MCH RBC QN AUTO: 28.4 PG (ref 27–31)
MCHC RBC AUTO-ENTMCNC: 31.4 G/DL (ref 33–36)
MCHC RBC AUTO-ENTMCNC: 31.9 G/DL (ref 32–36)
MCV RBC AUTO: 89 FL (ref 82–98)
MCV RBC AUTO: 90.4 FL (ref 80–94)
METHADONE UR QL SCN>300 NG/ML: NEGATIVE
MIN VOL: 11.3
MIN VOL: 8.44
MIN VOL: 9.19
MODE: ABNORMAL
MONOCYTES # BLD AUTO: 0.3 K/UL (ref 0.3–1)
MONOCYTES # BLD AUTO: 0.6 X10(3)/MCL (ref 0.1–1.3)
MONOCYTES NFR BLD AUTO: 5.2 %
MONOCYTES NFR BLD: 2.3 % (ref 4–15)
NEUTROPHILS # BLD AUTO: 11.7 K/UL (ref 1.8–7.7)
NEUTROPHILS # BLD AUTO: 9.5 X10(3)/MCL (ref 2.1–9.2)
NEUTROPHILS NFR BLD AUTO: 82.2 %
NEUTROPHILS NFR BLD: 93.5 % (ref 38–73)
NONHDLC SERPL-MCNC: 81 MG/DL
NRBC BLD AUTO-RTO: 0 %
NRBC BLD-RTO: 0 /100 WBC
OPIATES UR QL SCN: NEGATIVE
PCO2 BLDA: 37.1 MMHG (ref 35–45)
PCO2 BLDA: 43.3 MMHG (ref 35–45)
PCP UR QL SCN>25 NG/ML: NEGATIVE
PEEP: 5
PH SMN: 7.49 [PH] (ref 7.35–7.45)
PH SMN: 7.5 [PH] (ref 7.35–7.45)
PIP: 24
PIP: 25
PIP: 29
PLATELET # BLD AUTO: 244 K/UL (ref 150–450)
PLATELET # BLD AUTO: 304 X10(3)/MCL (ref 130–400)
PMV BLD AUTO: 10 FL (ref 7.4–10.4)
PMV BLD AUTO: 9.5 FL (ref 9.2–12.9)
PO2 BLDA: 218 MMHG (ref 80–100)
PO2 BLDA: 427 MMHG (ref 80–100)
PO2 BLDA: 45 MMHG (ref 40–60)
POC BE: 10 MMOL/L
POC BE: 5 MMOL/L
POC IONIZED CALCIUM: 0.99 MMOL/L (ref 1.06–1.42)
POC SATURATED O2: 100 % (ref 95–100)
POC SATURATED O2: 100 % (ref 95–100)
POC SATURATED O2: 81 % (ref 95–100)
POC TCO2 (MEASURED): 31 MMOL/L (ref 23–29)
POC TCO2: 29 MMOL/L (ref 23–27)
POC TCO2: 35 MMOL/L (ref 23–27)
POCT GLUCOSE: 135 MG/DL (ref 70–110)
POCT GLUCOSE: 180 MG/DL (ref 70–110)
POCT GLUCOSE: 199 MG/DL (ref 70–110)
POCT GLUCOSE: 208 MG/DL (ref 70–110)
POTASSIUM BLD-SCNC: 3.4 MMOL/L (ref 3.5–5.1)
POTASSIUM SERPL-SCNC: 3.4 MMOL/L (ref 3.5–5.1)
POTASSIUM SERPL-SCNC: 3.6 MMOL/L (ref 3.5–5.1)
POTASSIUM SERPL-SCNC: 4 MMOL/L (ref 3.5–5.1)
POTASSIUM SERPL-SCNC: 4 MMOL/L (ref 3.5–5.1)
PROT SERPL-MCNC: 7.7 G/DL (ref 6–8.4)
PROT SERPL-MCNC: 7.9 G/DL (ref 6–8.4)
PROT SERPL-MCNC: 8.3 GM/DL (ref 6.4–8.3)
PROTHROMBIN TIME: 14 SEC (ref 9–12.5)
PROTHROMBIN TIME: 15 SECONDS (ref 12.5–14.5)
RBC # BLD AUTO: 3.56 M/UL (ref 4.6–6.2)
RBC # BLD AUTO: 4.08 X10(6)/MCL (ref 4.7–6.1)
SAMPLE: ABNORMAL
SITE: ABNORMAL
SODIUM BLD-SCNC: 140 MMOL/L (ref 136–145)
SODIUM SERPL-SCNC: 137 MMOL/L (ref 136–145)
SODIUM SERPL-SCNC: 139 MMOL/L (ref 136–145)
SODIUM SERPL-SCNC: 140 MMOL/L (ref 136–145)
SODIUM SERPL-SCNC: 140 MMOL/L (ref 136–145)
SP02: 100
TOXICOLOGY INFORMATION: ABNORMAL
TRIGL SERPL-MCNC: 120 MG/DL (ref 34–140)
TRIGL SERPL-MCNC: 89 MG/DL (ref 30–150)
TSH SERPL DL<=0.005 MIU/L-ACNC: 0.87 UIU/ML (ref 0.4–4)
TSH SERPL-ACNC: 4.95 UIU/ML (ref 0.35–4.94)
VLDLC SERPL CALC-MCNC: 24 MG/DL
VT: 530
VT: 550
VT: 550
WBC # BLD AUTO: 12.54 K/UL (ref 3.9–12.7)
WBC # SPEC AUTO: 11.55 X10(3)/MCL (ref 4.5–11.5)

## 2023-08-31 PROCEDURE — 96375 TX/PRO/DX INJ NEW DRUG ADDON: CPT

## 2023-08-31 PROCEDURE — 63600175 PHARM REV CODE 636 W HCPCS

## 2023-08-31 PROCEDURE — 80307 DRUG TEST PRSMV CHEM ANLYZR: CPT | Performed by: STUDENT IN AN ORGANIZED HEALTH CARE EDUCATION/TRAINING PROGRAM

## 2023-08-31 PROCEDURE — 63600175 PHARM REV CODE 636 W HCPCS: Performed by: STUDENT IN AN ORGANIZED HEALTH CARE EDUCATION/TRAINING PROGRAM

## 2023-08-31 PROCEDURE — 27000248 HC VAD-ADDITIONAL DAY

## 2023-08-31 PROCEDURE — 99285 EMERGENCY DEPT VISIT HI MDM: CPT | Mod: 25

## 2023-08-31 PROCEDURE — 95714 VEEG EA 12-26 HR UNMNTR: CPT

## 2023-08-31 PROCEDURE — 96365 THER/PROPH/DIAG IV INF INIT: CPT | Mod: 59

## 2023-08-31 PROCEDURE — 93010 ELECTROCARDIOGRAM REPORT: CPT | Mod: ,,, | Performed by: INTERNAL MEDICINE

## 2023-08-31 PROCEDURE — 25500020 PHARM REV CODE 255: Performed by: INTERNAL MEDICINE

## 2023-08-31 PROCEDURE — 99900035 HC TECH TIME PER 15 MIN (STAT)

## 2023-08-31 PROCEDURE — 93750 INTERROGATION VAD IN PERSON: CPT | Mod: ,,, | Performed by: INTERNAL MEDICINE

## 2023-08-31 PROCEDURE — 83735 ASSAY OF MAGNESIUM: CPT | Performed by: STUDENT IN AN ORGANIZED HEALTH CARE EDUCATION/TRAINING PROGRAM

## 2023-08-31 PROCEDURE — 99900026 HC AIRWAY MAINTENANCE (STAT)

## 2023-08-31 PROCEDURE — 84443 ASSAY THYROID STIM HORMONE: CPT | Performed by: STUDENT IN AN ORGANIZED HEALTH CARE EDUCATION/TRAINING PROGRAM

## 2023-08-31 PROCEDURE — 99223 1ST HOSP IP/OBS HIGH 75: CPT | Mod: ,,, | Performed by: PSYCHIATRY & NEUROLOGY

## 2023-08-31 PROCEDURE — 63600175 PHARM REV CODE 636 W HCPCS: Performed by: INTERNAL MEDICINE

## 2023-08-31 PROCEDURE — 80053 COMPREHEN METABOLIC PANEL: CPT | Performed by: STUDENT IN AN ORGANIZED HEALTH CARE EDUCATION/TRAINING PROGRAM

## 2023-08-31 PROCEDURE — 85610 PROTHROMBIN TIME: CPT | Performed by: STUDENT IN AN ORGANIZED HEALTH CARE EDUCATION/TRAINING PROGRAM

## 2023-08-31 PROCEDURE — 99291 PR CRITICAL CARE, E/M 30-74 MINUTES: ICD-10-PCS | Mod: ,,, | Performed by: INTERNAL MEDICINE

## 2023-08-31 PROCEDURE — 99291 CRITICAL CARE FIRST HOUR: CPT | Mod: ,,, | Performed by: INTERNAL MEDICINE

## 2023-08-31 PROCEDURE — 95700 EEG CONT REC W/VID EEG TECH: CPT

## 2023-08-31 PROCEDURE — 25000003 PHARM REV CODE 250: Performed by: NURSE PRACTITIONER

## 2023-08-31 PROCEDURE — 85025 COMPLETE CBC W/AUTO DIFF WBC: CPT | Performed by: STUDENT IN AN ORGANIZED HEALTH CARE EDUCATION/TRAINING PROGRAM

## 2023-08-31 PROCEDURE — 51702 INSERT TEMP BLADDER CATH: CPT

## 2023-08-31 PROCEDURE — 25000003 PHARM REV CODE 250: Performed by: STUDENT IN AN ORGANIZED HEALTH CARE EDUCATION/TRAINING PROGRAM

## 2023-08-31 PROCEDURE — 93010 EKG 12-LEAD: ICD-10-PCS | Mod: ,,, | Performed by: INTERNAL MEDICINE

## 2023-08-31 PROCEDURE — 80048 BASIC METABOLIC PNL TOTAL CA: CPT | Mod: XB | Performed by: STUDENT IN AN ORGANIZED HEALTH CARE EDUCATION/TRAINING PROGRAM

## 2023-08-31 PROCEDURE — 95720 EEG PHY/QHP EA INCR W/VEEG: CPT | Mod: ,,, | Performed by: STUDENT IN AN ORGANIZED HEALTH CARE EDUCATION/TRAINING PROGRAM

## 2023-08-31 PROCEDURE — 25000003 PHARM REV CODE 250: Performed by: INTERNAL MEDICINE

## 2023-08-31 PROCEDURE — 99222 1ST HOSP IP/OBS MODERATE 55: CPT | Mod: ,,, | Performed by: NURSE PRACTITIONER

## 2023-08-31 PROCEDURE — 83880 ASSAY OF NATRIURETIC PEPTIDE: CPT | Performed by: STUDENT IN AN ORGANIZED HEALTH CARE EDUCATION/TRAINING PROGRAM

## 2023-08-31 PROCEDURE — 31500 AD HOC INTUBATION: ICD-10-PCS | Mod: ,,, | Performed by: ANESTHESIOLOGY

## 2023-08-31 PROCEDURE — 99223 PR INITIAL HOSPITAL CARE,LEVL III: ICD-10-PCS | Mod: ,,, | Performed by: PSYCHIATRY & NEUROLOGY

## 2023-08-31 PROCEDURE — 80053 COMPREHEN METABOLIC PANEL: CPT | Mod: 91 | Performed by: STUDENT IN AN ORGANIZED HEALTH CARE EDUCATION/TRAINING PROGRAM

## 2023-08-31 PROCEDURE — 93750 PR INTERROGATE VENT ASSIST DEV, IN PERSON, W PHYSICIAN ANALYSIS: ICD-10-PCS | Mod: ,,, | Performed by: INTERNAL MEDICINE

## 2023-08-31 PROCEDURE — 25000003 PHARM REV CODE 250

## 2023-08-31 PROCEDURE — 37799 UNLISTED PX VASCULAR SURGERY: CPT

## 2023-08-31 PROCEDURE — 31500 INSERT EMERGENCY AIRWAY: CPT | Mod: ,,, | Performed by: ANESTHESIOLOGY

## 2023-08-31 PROCEDURE — 83036 HEMOGLOBIN GLYCOSYLATED A1C: CPT | Performed by: STUDENT IN AN ORGANIZED HEALTH CARE EDUCATION/TRAINING PROGRAM

## 2023-08-31 PROCEDURE — 20000000 HC ICU ROOM

## 2023-08-31 PROCEDURE — 95720 PR EEG, W/VIDEO, CONT RECORD, I&R, >12<26 HRS: ICD-10-PCS | Mod: ,,, | Performed by: STUDENT IN AN ORGANIZED HEALTH CARE EDUCATION/TRAINING PROGRAM

## 2023-08-31 PROCEDURE — 63600175 PHARM REV CODE 636 W HCPCS: Performed by: NURSE PRACTITIONER

## 2023-08-31 PROCEDURE — 80061 LIPID PANEL: CPT | Performed by: STUDENT IN AN ORGANIZED HEALTH CARE EDUCATION/TRAINING PROGRAM

## 2023-08-31 PROCEDURE — 82803 BLOOD GASES ANY COMBINATION: CPT

## 2023-08-31 PROCEDURE — 27100171 HC OXYGEN HIGH FLOW UP TO 24 HOURS

## 2023-08-31 PROCEDURE — 99222 PR INITIAL HOSPITAL CARE,LEVL II: ICD-10-PCS | Mod: ,,, | Performed by: NURSE PRACTITIONER

## 2023-08-31 PROCEDURE — 94761 N-INVAS EAR/PLS OXIMETRY MLT: CPT

## 2023-08-31 PROCEDURE — 80047 BASIC METABLC PNL IONIZED CA: CPT

## 2023-08-31 PROCEDURE — 25500020 PHARM REV CODE 255: Performed by: STUDENT IN AN ORGANIZED HEALTH CARE EDUCATION/TRAINING PROGRAM

## 2023-08-31 PROCEDURE — 94002 VENT MGMT INPAT INIT DAY: CPT

## 2023-08-31 PROCEDURE — 93041 RHYTHM ECG TRACING: CPT

## 2023-08-31 PROCEDURE — 93005 ELECTROCARDIOGRAM TRACING: CPT

## 2023-08-31 PROCEDURE — 36620 INSERTION CATHETER ARTERY: CPT

## 2023-08-31 RX ORDER — INSULIN ASPART 100 [IU]/ML
0-10 INJECTION, SOLUTION INTRAVENOUS; SUBCUTANEOUS EVERY 4 HOURS PRN
Status: DISCONTINUED | OUTPATIENT
Start: 2023-08-31 | End: 2023-09-02

## 2023-08-31 RX ORDER — POTASSIUM CHLORIDE 20 MEQ/1
40 TABLET, EXTENDED RELEASE ORAL ONCE
Status: DISCONTINUED | OUTPATIENT
Start: 2023-08-31 | End: 2023-08-31

## 2023-08-31 RX ORDER — NICARDIPINE HYDROCHLORIDE 0.2 MG/ML
0-15 INJECTION INTRAVENOUS CONTINUOUS
Status: DISCONTINUED | OUTPATIENT
Start: 2023-08-31 | End: 2023-09-04

## 2023-08-31 RX ORDER — MILRINONE LACTATE 0.2 MG/ML
0.25 INJECTION, SOLUTION INTRAVENOUS CONTINUOUS
Status: DISCONTINUED | OUTPATIENT
Start: 2023-08-31 | End: 2023-08-31 | Stop reason: HOSPADM

## 2023-08-31 RX ORDER — POLYETHYLENE GLYCOL 3350 17 G/17G
17 POWDER, FOR SOLUTION ORAL DAILY
Status: DISCONTINUED | OUTPATIENT
Start: 2023-09-01 | End: 2023-09-11 | Stop reason: HOSPADM

## 2023-08-31 RX ORDER — FUROSEMIDE 10 MG/ML
80 INJECTION INTRAMUSCULAR; INTRAVENOUS 3 TIMES DAILY
Status: DISCONTINUED | OUTPATIENT
Start: 2023-08-31 | End: 2023-09-01

## 2023-08-31 RX ORDER — ETOMIDATE 2 MG/ML
INJECTION INTRAVENOUS
Status: DISCONTINUED | OUTPATIENT
Start: 2023-08-31 | End: 2023-09-01 | Stop reason: HOSPADM

## 2023-08-31 RX ORDER — LEVETIRACETAM 10 MG/ML
INJECTION INTRAVASCULAR
Status: COMPLETED
Start: 2023-08-31 | End: 2023-08-31

## 2023-08-31 RX ORDER — GLUCAGON 1 MG
1 KIT INJECTION
Status: DISCONTINUED | OUTPATIENT
Start: 2023-08-31 | End: 2023-09-02

## 2023-08-31 RX ORDER — MILRINONE LACTATE 0.2 MG/ML
0.25 INJECTION, SOLUTION INTRAVENOUS CONTINUOUS
Status: DISCONTINUED | OUTPATIENT
Start: 2023-08-31 | End: 2023-09-11 | Stop reason: HOSPADM

## 2023-08-31 RX ORDER — SODIUM CHLORIDE 0.9 % (FLUSH) 0.9 %
10 SYRINGE (ML) INJECTION
Status: DISCONTINUED | OUTPATIENT
Start: 2023-08-31 | End: 2023-09-05

## 2023-08-31 RX ORDER — ETOMIDATE 2 MG/ML
INJECTION INTRAVENOUS
Status: COMPLETED
Start: 2023-08-31 | End: 2023-08-31

## 2023-08-31 RX ORDER — MILRINONE LACTATE 0.2 MG/ML
0.25 INJECTION, SOLUTION INTRAVENOUS CONTINUOUS
Status: CANCELLED | OUTPATIENT
Start: 2023-08-31

## 2023-08-31 RX ORDER — POTASSIUM CHLORIDE 14.9 MG/ML
20 INJECTION INTRAVENOUS ONCE
Status: COMPLETED | OUTPATIENT
Start: 2023-08-31 | End: 2023-09-01

## 2023-08-31 RX ORDER — SUCCINYLCHOLINE CHLORIDE 20 MG/ML
INJECTION INTRAMUSCULAR; INTRAVENOUS
Status: DISCONTINUED | OUTPATIENT
Start: 2023-08-31 | End: 2023-09-01 | Stop reason: HOSPADM

## 2023-08-31 RX ORDER — MUPIROCIN 20 MG/G
OINTMENT TOPICAL 2 TIMES DAILY
Status: DISPENSED | OUTPATIENT
Start: 2023-08-31 | End: 2023-09-05

## 2023-08-31 RX ORDER — INSULIN ASPART 100 [IU]/ML
0-5 INJECTION, SOLUTION INTRAVENOUS; SUBCUTANEOUS EVERY 4 HOURS PRN
Status: DISCONTINUED | OUTPATIENT
Start: 2023-08-31 | End: 2023-08-31

## 2023-08-31 RX ORDER — NAPROXEN SODIUM 220 MG/1
81 TABLET, FILM COATED ORAL DAILY
Status: DISCONTINUED | OUTPATIENT
Start: 2023-08-31 | End: 2023-09-11 | Stop reason: HOSPADM

## 2023-08-31 RX ORDER — SUCCINYLCHOLINE CHLORIDE 20 MG/ML
INJECTION INTRAMUSCULAR; INTRAVENOUS
Status: COMPLETED
Start: 2023-08-31 | End: 2023-08-31

## 2023-08-31 RX ORDER — GLUCAGON 1 MG
1 KIT INJECTION
Status: DISCONTINUED | OUTPATIENT
Start: 2023-08-31 | End: 2023-08-31

## 2023-08-31 RX ORDER — POTASSIUM CHLORIDE 29.8 MG/ML
40 INJECTION INTRAVENOUS ONCE
Status: COMPLETED | OUTPATIENT
Start: 2023-08-31 | End: 2023-08-31

## 2023-08-31 RX ORDER — PROPOFOL 10 MG/ML
0-50 INJECTION, EMULSION INTRAVENOUS CONTINUOUS
Status: DISCONTINUED | OUTPATIENT
Start: 2023-08-31 | End: 2023-09-01

## 2023-08-31 RX ORDER — MAGNESIUM SULFATE HEPTAHYDRATE 40 MG/ML
2 INJECTION, SOLUTION INTRAVENOUS ONCE
Status: COMPLETED | OUTPATIENT
Start: 2023-08-31 | End: 2023-09-01

## 2023-08-31 RX ORDER — MILRINONE LACTATE 0.2 MG/ML
0.38 INJECTION, SOLUTION INTRAVENOUS CONTINUOUS
Status: CANCELLED | OUTPATIENT
Start: 2023-08-31

## 2023-08-31 RX ORDER — PROPOFOL 10 MG/ML
INJECTION, EMULSION INTRAVENOUS
Status: COMPLETED
Start: 2023-08-31 | End: 2023-08-31

## 2023-08-31 RX ORDER — FENTANYL CITRATE 50 UG/ML
INJECTION, SOLUTION INTRAMUSCULAR; INTRAVENOUS
Status: DISCONTINUED | OUTPATIENT
Start: 2023-08-31 | End: 2023-09-01 | Stop reason: HOSPADM

## 2023-08-31 RX ORDER — FENTANYL CITRATE-0.9 % NACL/PF 10 MCG/ML
0-250 PLASTIC BAG, INJECTION (ML) INTRAVENOUS CONTINUOUS
Status: DISCONTINUED | OUTPATIENT
Start: 2023-08-31 | End: 2023-09-01

## 2023-08-31 RX ORDER — ATORVASTATIN CALCIUM 20 MG/1
40 TABLET, FILM COATED ORAL DAILY
Status: DISCONTINUED | OUTPATIENT
Start: 2023-08-31 | End: 2023-09-11 | Stop reason: HOSPADM

## 2023-08-31 RX ORDER — LORAZEPAM 2 MG/ML
1 INJECTION INTRAMUSCULAR
Status: COMPLETED | OUTPATIENT
Start: 2023-08-31 | End: 2023-08-31

## 2023-08-31 RX ORDER — PROPOFOL 10 MG/ML
INJECTION, EMULSION INTRAVENOUS
Status: DISPENSED
Start: 2023-08-31 | End: 2023-09-01

## 2023-08-31 RX ORDER — LEVETIRACETAM 10 MG/ML
2000 INJECTION INTRAVASCULAR
Status: COMPLETED | OUTPATIENT
Start: 2023-08-31 | End: 2023-08-31

## 2023-08-31 RX ORDER — NICARDIPINE HYDROCHLORIDE 0.2 MG/ML
INJECTION INTRAVENOUS
Status: COMPLETED
Start: 2023-08-31 | End: 2023-08-31

## 2023-08-31 RX ORDER — SENNOSIDES 8.6 MG/1
8.6 TABLET ORAL DAILY
Status: DISCONTINUED | OUTPATIENT
Start: 2023-09-01 | End: 2023-09-11 | Stop reason: HOSPADM

## 2023-08-31 RX ORDER — FENTANYL CITRATE 50 UG/ML
INJECTION, SOLUTION INTRAMUSCULAR; INTRAVENOUS
Status: COMPLETED
Start: 2023-08-31 | End: 2023-08-31

## 2023-08-31 RX ORDER — DEXTROSE MONOHYDRATE 100 MG/ML
INJECTION, SOLUTION INTRAVENOUS CONTINUOUS PRN
Status: DISCONTINUED | OUTPATIENT
Start: 2023-08-31 | End: 2023-08-31

## 2023-08-31 RX ORDER — MIDAZOLAM HYDROCHLORIDE 1 MG/ML
INJECTION INTRAMUSCULAR; INTRAVENOUS
Status: DISCONTINUED
Start: 2023-08-31 | End: 2023-08-31 | Stop reason: WASHOUT

## 2023-08-31 RX ORDER — PHENYLEPHRINE HCL IN 0.9% NACL 1 MG/10 ML
SYRINGE (ML) INTRAVENOUS
Status: DISPENSED
Start: 2023-08-31 | End: 2023-08-31

## 2023-08-31 RX ORDER — ROCURONIUM BROMIDE 10 MG/ML
INJECTION, SOLUTION INTRAVENOUS
Status: DISCONTINUED
Start: 2023-08-31 | End: 2023-08-31 | Stop reason: WASHOUT

## 2023-08-31 RX ADMIN — MAGNESIUM SULFATE 2 G: 2 INJECTION INTRAVENOUS at 11:08

## 2023-08-31 RX ADMIN — NICARDIPINE HYDROCHLORIDE 5 MG/HR: 0.2 INJECTION, SOLUTION INTRAVENOUS at 12:08

## 2023-08-31 RX ADMIN — FENTANYL CITRATE 100 MCG: 50 INJECTION, SOLUTION INTRAMUSCULAR; INTRAVENOUS at 12:08

## 2023-08-31 RX ADMIN — LEVETIRACETAM INJECTION 2000 MG: 10 INJECTION INTRAVENOUS at 05:08

## 2023-08-31 RX ADMIN — PROPOFOL 15 MCG/KG/MIN: 10 INJECTION, EMULSION INTRAVENOUS at 12:08

## 2023-08-31 RX ADMIN — NICARDIPINE HYDROCHLORIDE 5 MG/HR: 0.2 INJECTION INTRAVENOUS at 12:08

## 2023-08-31 RX ADMIN — ATORVASTATIN CALCIUM 40 MG: 20 TABLET, FILM COATED ORAL at 04:08

## 2023-08-31 RX ADMIN — PROPOFOL 40 MCG/KG/MIN: 10 INJECTION, EMULSION INTRAVENOUS at 07:08

## 2023-08-31 RX ADMIN — IOPAMIDOL 50 ML: 755 INJECTION, SOLUTION INTRAVENOUS at 05:08

## 2023-08-31 RX ADMIN — ETOMIDATE 20 MG: 2 INJECTION, SOLUTION INTRAVENOUS at 12:08

## 2023-08-31 RX ADMIN — MUPIROCIN: 20 OINTMENT TOPICAL at 09:08

## 2023-08-31 RX ADMIN — MILRINONE LACTATE IN DEXTROSE 0.25 MCG/KG/MIN: 200 INJECTION, SOLUTION INTRAVENOUS at 05:08

## 2023-08-31 RX ADMIN — FUROSEMIDE 80 MG: 10 INJECTION, SOLUTION INTRAVENOUS at 01:08

## 2023-08-31 RX ADMIN — MILRINONE LACTATE IN DEXTROSE 0.25 MCG/KG/MIN: 200 INJECTION, SOLUTION INTRAVENOUS at 10:08

## 2023-08-31 RX ADMIN — INSULIN DETEMIR 10 UNITS: 100 INJECTION, SOLUTION SUBCUTANEOUS at 01:08

## 2023-08-31 RX ADMIN — SUCCINYLCHOLINE CHLORIDE 100 MG: 20 INJECTION, SOLUTION INTRAMUSCULAR; INTRAVENOUS at 12:08

## 2023-08-31 RX ADMIN — POTASSIUM CHLORIDE 20 MEQ: 200 INJECTION, SOLUTION INTRAVENOUS at 11:08

## 2023-08-31 RX ADMIN — FUROSEMIDE 80 MG: 10 INJECTION, SOLUTION INTRAVENOUS at 09:08

## 2023-08-31 RX ADMIN — INSULIN DETEMIR 9 UNITS: 100 INJECTION, SOLUTION SUBCUTANEOUS at 09:08

## 2023-08-31 RX ADMIN — ASPIRIN 81 MG 81 MG: 81 TABLET ORAL at 04:08

## 2023-08-31 RX ADMIN — LEVETIRACETAM 2000 MG: 10 INJECTION INTRAVASCULAR at 05:08

## 2023-08-31 RX ADMIN — POTASSIUM CHLORIDE 40 MEQ: 29.8 INJECTION, SOLUTION INTRAVENOUS at 05:08

## 2023-08-31 RX ADMIN — CEFAZOLIN 8 G: 10 INJECTION, POWDER, FOR SOLUTION INTRAVENOUS at 01:08

## 2023-08-31 RX ADMIN — LORAZEPAM 1 MG: 2 INJECTION INTRAMUSCULAR; INTRAVENOUS at 06:08

## 2023-08-31 RX ADMIN — Medication 100 MCG/HR: at 12:08

## 2023-08-31 RX ADMIN — INSULIN ASPART 2 UNITS: 100 INJECTION, SOLUTION INTRAVENOUS; SUBCUTANEOUS at 06:08

## 2023-08-31 RX ADMIN — IOHEXOL 100 ML: 350 INJECTION, SOLUTION INTRAVENOUS at 12:08

## 2023-08-31 NOTE — PROGRESS NOTES
08/31/2023  Miracle Caballero    Current provider:  Luca Lopez Jr.*    Device interrogation:      8/31/2023    11:47 AM 8/31/2023    10:18 AM 8/8/2023     3:36 PM 8/8/2023    11:47 AM 8/8/2023     7:45 AM 8/8/2023     4:20 AM 8/8/2023    12:44 AM   TXP LVAD INTERROGATIONS   Type HeartMate3 HeartMate3 HeartMate3 HeartMate3 HeartMate3     Flow 3.9 3.7 4.6 4.8 4.5 4.4 4.3   Speed 5100 5100 5100 5100 5100 5100 5100   PI 5.8 6.5 3.1 2.9 3.7 3.8 3.3   Power (Serna) 3.7 3.7 3.5 3.6 3.6 3.4 3.3   LSL 4700 4700 4700 4700 4700 4700 4700   Pulsatility  Intermittent pulse Intermittent pulse Intermittent pulse Intermittent pulse Intermittent pulse Pulse          Rounded on Kevan Queen to ensure all mechanical assist device settings (IABP or VAD) were appropriate and all parameters were within limits.  I was able to ensure all back up equipment was present, the staff had no issues, and the Perfusion Department daily rounding was complete.      For implantable VADs: Interrogation of Ventricular assist device was performed with analysis of device parameters and review of device function. I have personally reviewed the interrogation findings and agree with findings as stated.     In emergency, the nursing units have been notified to contact the perfusion department either by:  Calling [id number] from 630am to 4pm Mon thru Fri, utilizing the On-Call Finder functionality of Epic and searching for Perfusion, or by contacting the hospital  from 4pm to 630am and on weekends and asking to speak with the perfusionist on call.    1:09 PM

## 2023-08-31 NOTE — ASSESSMENT & PLAN NOTE
- CTH with L parietal hypodensity with subtle areas of hyperdensity concerning for acute/subacute infarct with petechial hemorrhage, CTA negative for LVO or critical stenosis. Determined not a candidate for acute interventions with thrombolytics/thrombectomy.  - As per vascular neuro, continue aspirin and statin. Repeat CT brain tomorrow to determine whether to restart anticoagulation. Permissive HTN.   - PT, OT, Speech consulted.

## 2023-08-31 NOTE — CONSULTS
"[facility]  Pulmonology  Consult Note    Patient Name: Kevan Queen  MRN: [id number]  Admission Date: 8/31/2023  Hospital Length of Stay: 0 days  Code Status: Full Code  Attending Physician: Luca Lopez Jr.*  Primary Care Provider: Rosio Armendariz FNP   Principal Problem: Required emergent intubation    Inpatient consult to Pulmonology  Consult performed by: Alexandra Rader MD  Consult ordered by: Chantal Hrendon MD        Subjective:     HPI:  Mr. Queen is a 38 year old male with PMHx of CHF, DM, substance use disorder, medication noncompliance, cardiomyopathy s/p LVAD and seizures who presented to OSH via EMS for altered mental status. Last known normal was 0200 on 8/31. Pt unable to follow commands and bilateral UE tremors were noted at that time. In ED he grew more lethargic. CT head showed hypodensity in L posterior parietal lobe with mass effect and possible acute to subacute infarct and petechial hemorrhage. CXR with cardiomegaly unchanged from previous imaging as well as worsening pulmonary edema. . POC CO2 31.  [place] [facility] [place]2 VAD. [place] [facility] with possible seizures and intubated due to concern for airway protection.    Pulm consulted for "Vent managment in LVAD patient w/ stroke. Intubation for airway protection"      Past Medical History:   Diagnosis Date    Acute respiratory failure with hypoxia 7/22/2023    Arthritis     Awareness alteration, transient 9/1/2022    Cardiomyopathy     CHF (congestive heart failure) 10/01/2020    COVID-19 6/3/2023    Diabetes mellitus     Dilated cardiomyopathy 10/26/2020    Drug abuse 10/2020    Headache 4/19/2023    Hyperglycemia 12/16/2022    Hyperosmolar hyperglycemic state (HHS) 5/25/2022    ICD (implantable cardioverter-defibrillator) in place 10/26/2020    Left ventricular assist device (LVAD) complication, initial encounter 6/5/2023    Muscle cramping 6/15/2022    Renal " disorder     SOB (shortness of breath) 6/13/2022    Tingling in extremities 7/13/2022       Past Surgical History:   Procedure Laterality Date    APPLICATION OF WOUND VACUUM-ASSISTED CLOSURE DEVICE N/A 6/30/2022    Procedure: APPLICATION, WOUND VAC;  Surgeon: Luis F Paige MD;  [facility];  Service: Cardiovascular;  Laterality: N/A;  50 x 5 cm    CARDIAC DEFIBRILLATOR PLACEMENT      IMPLANTATION OF RIGHT VENTRICULAR ASSIST DEVICE (RVAD) N/A 6/29/2022    Procedure: INSERTION, RVAD;  Surgeon: Luis F Paige MD;  [facility];  Service: Cardiovascular;  Laterality: N/A;    INSERTION OF GRAFT TO PERICARDIUM Right 6/30/2022    Procedure: INSERTION-RIGHT VENTRICULAR ASSIST DEVICE;  Surgeon: Luis F Paige MD;  [facility];  Service: Cardiovascular;  Laterality: Right;    IRRIGATION OF MEDIASTINUM  6/30/2022    Procedure: IRRIGATION, MEDIASTINUM;  Surgeon: Luis F Paige MD;  [facility];  Service: Cardiovascular;;    LEFT VENTRICULAR ASSIST DEVICE Left 6/23/2022    Procedure: INSERTION-LEFT VENTRICULAR ASSIST DEVICE;  Surgeon: Luis F Paige MD;  [facility];  Service: Cardiovascular;  Laterality: Left;    LEFT VENTRICULAR ASSIST DEVICE N/A 6/29/2022    Procedure: INSERTION-LEFT VENTRICULAR ASSIST DEVICE;  Surgeon: Luis F Paige MD;  [facility];  Service: Cardiovascular;  Laterality: N/A;    RIGHT HEART CATHETERIZATION Right 4/8/2022    Procedure: INSERTION, CATHETER, RIGHT HEART;  Surgeon: Luca Lopez Jr., MD;  Location: [facility] CATH LAB;  Service: Cardiology;  Laterality: Right;    RIGHT HEART CATHETERIZATION Right 4/19/2022    Procedure: INSERTION, CATHETER, RIGHT HEART;  Surgeon: Josh Pulido MD;  Location: [facility] CATH LAB;  Service: Cardiology;  Laterality: Right;    RIGHT HEART CATHETERIZATION Right 7/21/2022    Procedure: INSERTION, CATHETER, RIGHT HEART;  Surgeon: Dalia Crum MD;  Location: [facility] CATH  LAB;  Service: Cardiology;  Laterality: Right;    RIGHT HEART CATHETERIZATION Right 10/31/2022    Procedure: INSERTION, CATHETER, RIGHT HEART;  Surgeon: Dalia Crum MD;  Location: [facility] CATH LAB;  Service: Cardiology;  Laterality: Right;    STERNAL WOUND CLOSURE N/A 2022    Procedure: CLOSURE, WOUND, STERNUM;  Surgeon: Luis F Paige MD;  [facility]: [facility];  Service: Cardiovascular;  Laterality: N/A;       Review of patient's allergies indicates:   Allergen Reactions    Bumex [bumetanide] Hives    Torsemide Hives       Family History       Problem Relation (Age of Onset)    Diverticulosis Brother    Heart attack Maternal Grandmother, Maternal Grandfather    Heart failure Father          Tobacco Use    Smoking status: Former     Current packs/day: 0.00     Average packs/day: 0.5 packs/day for 16.6 years (8.3 ttl pk-yrs)     Types: Cigarettes     Start date: 10/1/2004     Quit date: 2021     Years since quittin.3    Smokeless tobacco: Never   Substance and Sexual Activity    Alcohol use: Not Currently    Drug use: Not Currently     Types: Marijuana, MDMA (Ecstacy)    Sexual activity: Yes     Partners: Female     Birth control/protection: None         Review of Systems   Unable to perform ROS: Intubated     Objective:     Vital Signs (Most Recent):  Temp: 99.7 °F (37.6 °C) (23 1149)  Pulse: (!) 151 (23 1425)  Resp: 18 (23 1425)  BP: (!) 117/92 (23 1200)  SpO2: 100 % (23 1425) Vital Signs (24h Range):  Temp:  [97 °F (36.1 °C)-99.7 °F (37.6 °C)] 99.7 °F (37.6 °C)  Pulse:  [108-214] 151  Resp:  [15-39] 18  SpO2:  [91 %-100 %] 100 %  BP: (102-153)/(0-102) 117/92  Arterial Line BP: ()/(70-93) 91/70     Weight: 95.5 kg (210 lb 8.6 oz)  Body mass index is 26.32 kg/m².      Intake/Output Summary (Last 24 hours) at 2023 1451  Last data filed at 2023 1400  Gross per 24 hour   Intake 76.63 ml   Output 450 ml   Net -373.37 ml        Physical  Exam  Constitutional:       Appearance: He is ill-appearing.   HENT:      Head: Normocephalic and atraumatic.      Nose: Nose normal. No rhinorrhea.      Mouth/Throat:      Mouth: Mucous membranes are moist.      Pharynx: Oropharynx is clear.      Comments: ETT in place  Eyes:      General:         Right eye: No discharge.         Left eye: No discharge.      Conjunctiva/sclera: Conjunctivae normal.      Comments: Pinpoint pupils not reactive to threat (pt sedated)   Cardiovascular:      Rate and Rhythm: Regular rhythm. Tachycardia present.      Comments: Loud heart sounds (LVAD in place)  Pulmonary:      Breath sounds: Normal breath sounds. No wheezing, rhonchi or rales.   Abdominal:      General: Abdomen is flat.      Palpations: Abdomen is soft. There is no mass.      Hernia: No hernia is present.   Musculoskeletal:         General: No swelling.      Right lower leg: No edema.      Left lower leg: No edema.   Skin:     General: Skin is warm and dry.   Neurological:      Comments: Intubated and sedated. No gag reflex. Does not withdrawal from pain.          Vents:  Vent Mode: A/C (08/31/23 1425)  Ventilator Initiated: Yes (08/31/23 1224)  Set Rate: 15 BPM (08/31/23 1425)  Vt Set: 530 mL (08/31/23 1425)  PEEP/CPAP: 5 cmH20 (08/31/23 1425)  Oxygen Concentration (%): 50 (08/31/23 1425)  Peak Airway Pressure: 22 cmH20 (08/31/23 1425)  Plateau Pressure: 0 cmH20 (08/31/23 1425)  Total Ve: 8.1 L/m (08/31/23 1425)  Negative Inspiratory Force (cm H2O): 0 (08/31/23 1425)  F/VT Ratio<105 (RSBI): (!) 33.27 (08/31/23 1425)    Lines/Drains/Airways       Peripherally Inserted Central Catheter Line  Duration             PICC Double Lumen 08/01/23 30 days    PICC Double Lumen 08/01/23 1121 right basilic 30 days              Drain  Duration                  NG/OG Tube 08/31/23 1239 [place] sump 18 Fr. Center mouth <1 day         Urethral Catheter 08/31/23 1230 <1 day              Airway  Duration                  Airway -  Non-Surgical 08/31/23 1224 Endotracheal Tube <1 day       Airway Anesthesia 08/31/23 <1 day              Arterial Line  Duration             Arterial Line 08/31/23 1239 Right Radial <1 day              Line  Duration                  VAD 06/29/22 1115 Left ventricular assist device HeartMate 3 428 days              Peripheral Intravenous Line  Duration                  Peripheral IV - Single Lumen 08/31/23 1200 20 G Left Forearm <1 day         Peripheral IV - Single Lumen 08/31/23 18 G Left Antecubital <1 day                    Significant Labs:    CBC/Anemia Profile:  Recent Labs   Lab 08/31/23  0449 08/31/23  0536 08/31/23  1046   WBC  --  11.55* 12.54   HGB  --  11.6* 10.1*   HCT 43 36.9* 31.7*   PLT  --  304 244   MCV  --  90.4 89   RDW  --  20.2* 19.8*        Chemistries:  Recent Labs   Lab 08/31/23  0536 08/31/23  1046 08/31/23  1250    140 137   K 4.0 3.6 4.0   CL  --  100 100   CO2 29 30* 26   BUN 8.1* 8 8   CREATININE 1.37* 1.1 1.1   CALCIUM 8.3* 8.3* 8.2*   ALBUMIN 3.1* 2.9* 2.8*   PROT  --  7.9 7.7   BILITOT 0.8 0.7 0.7   ALKPHOS 114 101 95   ALT <5 <5* <5*   AST 29 22 21   GLUCOSE 227*  --   --        All pertinent labs within the past 24 hours have been reviewed.    Significant Imaging:   I have reviewed all pertinent imaging results/findings within the past 24 hours.    Assessment/Plan:     Pulmonary  * Required emergent intubation  Presented with altered mental status at mcg/kg/min OSH and found to have stroke. Transferred to [facility] due to LVAD history and vomited on arrival. Possible seizures. Intubated for airway protection. CXR with cardiomegaly however poor penetration so difficult to assess pulmonary edema.  Last EF 10-15% s/p LVAD. No history recent respiratory infection, ARDS, COPD, asthma. Does not appear to have acute insult to lungs.    ABG pH 7.49, CO2 37.1, O2 > 400 on FiO2 100%. Most recent serum CO2 30.   At bedside, tidal volume 560, RR 20, PEEP 5, FiO2 50%. Satting in high 90's. VAD  pt.  Intubated and sedated with propofol and fentanyl    - Recommend continuing RR of 12-20 bpm and TV of ~550 mL based on 6-8 mL/kg IBW (84 kg)  - Recommend continuing PEEP of 5, no recent hx atelectasis/ARDS/resp infxn  - Recommend decreasing FiO2 as tolerated for SaO2 > 88%  - If O2 requirements increase, recommend repeat CXR due to aspiration risk. No c/f aspiration pneumonitis at this time.  - Recommend suctioning for secretion management  - Recommend famotidine or pantoprazole for gastric ulcer ppx while intubated  - Recommend neuro workup prior to SAT/SBT        Thank you for your consult. I will follow-up with patient. Please contact us if you have any additional questions.     Alexandra Rader MD  Pulmonology  [facility]

## 2023-08-31 NOTE — SUBJECTIVE & OBJECTIVE
Past Medical History:   Diagnosis Date    Acute respiratory failure with hypoxia 7/22/2023    Arthritis     Awareness alteration, transient 9/1/2022    Cardiomyopathy     CHF (congestive heart failure) 10/01/2020    COVID-19 6/3/2023    Diabetes mellitus     Dilated cardiomyopathy 10/26/2020    Drug abuse 10/2020    Headache 4/19/2023    Hyperglycemia 12/16/2022    Hyperosmolar hyperglycemic state (HHS) 5/25/2022    ICD (implantable cardioverter-defibrillator) in place 10/26/2020    Left ventricular assist device (LVAD) complication, initial encounter 6/5/2023    Muscle cramping 6/15/2022    Renal disorder     SOB (shortness of breath) 6/13/2022    Tingling in extremities 7/13/2022     Past Surgical History:   Procedure Laterality Date    APPLICATION OF WOUND VACUUM-ASSISTED CLOSURE DEVICE N/A 6/30/2022    Procedure: APPLICATION, WOUND VAC;  Surgeon: Luis F Paige MD;  Location: [facility] OR [address] [place] [address];  Service: Cardiovascular;  Laterality: N/A;  50 x 5 cm    CARDIAC DEFIBRILLATOR PLACEMENT      IMPLANTATION OF RIGHT VENTRICULAR ASSIST DEVICE (RVAD) N/A 6/29/2022    Procedure: INSERTION, RVAD;  Surgeon: Luis F Paige MD;  [facility];  Service: Cardiovascular;  Laterality: N/A;    INSERTION OF GRAFT TO PERICARDIUM Right 6/30/2022    Procedure: INSERTION-RIGHT VENTRICULAR ASSIST DEVICE;  Surgeon: Luis F Paige MD;  Location: [facility] OR [facility]ND [facility];  Service: Cardiovascular;  Laterality: Right;    IRRIGATION OF MEDIASTINUM  6/30/2022    Procedure: IRRIGATION, MEDIASTINUM;  Surgeon: Luis F Paige MD;  [facility];  Service: Cardiovascular;;    LEFT VENTRICULAR ASSIST DEVICE Left 6/23/2022    Procedure: INSERTION-LEFT VENTRICULAR ASSIST DEVICE;  Surgeon: Luis F Paige MD;  Location: [facility] OR [facility];  Service: Cardiovascular;  Laterality: Left;    LEFT VENTRICULAR ASSIST DEVICE N/A 6/29/2022    Procedure: INSERTION-LEFT VENTRICULAR ASSIST DEVICE;  Surgeon: Luis F Paige MD;  Location: [facility] OR [facility];  Service: Cardiovascular;  Laterality: N/A;    RIGHT HEART CATHETERIZATION Right 2022    Procedure: INSERTION, CATHETER, RIGHT HEART;  Surgeon: Luca Lopez Jr., MD;  Location: [facility] CATH LAB;  Service: Cardiology;  Laterality: Right;    RIGHT HEART CATHETERIZATION Right 2022    Procedure: INSERTION, CATHETER, RIGHT HEART;  Surgeon: Josh Pulido MD;  Location: [facility] CATH LAB;  Service: Cardiology;  Laterality: Right;    RIGHT HEART CATHETERIZATION Right 2022    Procedure: INSERTION, CATHETER, RIGHT HEART;  Surgeon: Dalia Crum MD;  Location: [facility] CATH LAB;  Service: Cardiology;  Laterality: Right;    RIGHT HEART CATHETERIZATION Right 10/31/2022    Procedure: INSERTION, CATHETER, RIGHT HEART;  Surgeon: Dalia Crum MD;  Location: [facility] CATH LAB;  Service: Cardiology;  Laterality: Right;    STERNAL WOUND CLOSURE N/A 2022    Procedure: CLOSURE, WOUND, STERNUM;  Surgeon: Luis F Paige MD;  Location: [facility];  Service: Cardiovascular;  Laterality: N/A;     Family History   Problem Relation Age of Onset    Heart failure Father     Diverticulosis Brother     Heart attack Maternal Grandmother     Heart attack Maternal Grandfather      Social History     Tobacco Use    Smoking status: Former     Current packs/day: 0.00     Average packs/day: 0.5 packs/day for 16.6 years (8.3 ttl pk-yrs)     Types: Cigarettes     Start date: 10/1/2004     Quit date: 2021     Years since quittin.3    Smokeless tobacco: Never   Substance Use Topics    Alcohol use: Not Currently    Drug use: Not Currently     Types: Marijuana, MDMA (Ecstacy)     Review of patient's allergies indicates:   Allergen Reactions    Bumex [bumetanide] Hives    Torsemide Hives       Medications: I have reviewed the current medication administration record.    Medications Prior to Admission   Medication Sig Dispense Refill Last Dose    aspirin (ECOTRIN) 81 MG EC tablet Take 1 tablet (81 mg total) by mouth  "once daily. 90 tablet 3     blood sugar diagnostic Strp Use to test blood glucose 4 (four) times daily. 200 each 5     blood-glucose meter Misc Use as instructed 1 each 0     busPIRone (BUSPAR) 10 MG tablet Take 10 mg by mouth 2 (two) times daily.       dextrose 5 % (D5W) SolP 500 mL with ceFAZolin 1 gram SolR 8 g per day IV continuously       furosemide (LASIX) 80 MG tablet Take 1 tablet (80 mg total) by mouth once daily. 30 tablet 11     insulin aspart U-100 (NOVOLOG) 100 unit/mL (3 mL) InPn pen Inject 14 Units into the skin 3 (three) times daily with meals. Plus Sliding Scale: 151-200: +1, 201-250: +2, 251-300: +3, 301-350: +4 and call MD 18 mL 5     insulin detemir U-100, Levemir, 100 unit/mL (3 mL) SubQ InPn pen Inject 24 Units into the skin 2 (two) times daily. (Patient not taking: Reported on 8/9/2023) 15 mL 5     lancets 33 gauge Misc Use to test blood glucose 4 (four) times daily. 200 each 3     levETIRAcetam (KEPPRA) 1000 MG tablet Take 1 tablet (1,000 mg total) by mouth 2 (two) times daily. 60 tablet 11     milrinone 20mg/100ml D5W, 200mcg/ml, (PRIMACOR) 20 mg/100 mL (200 mcg/mL) infusion Inject 34.875 mcg/min into the vein continuous.       mirtazapine (REMERON) 15 MG tablet Take 1 tablet (15 mg total) by mouth nightly. 30 tablet 11     pen needle, diabetic 32 gauge x 5/32" Ndle Use to inject insulin 5 (five) times daily. 200 each 5     valsartan (DIOVAN) 40 MG tablet Take 1 tablet (40 mg total) by mouth 2 (two) times daily. 180 tablet 3     warfarin (COUMADIN) 3 MG tablet Take 1.5 tablets (4.5mg) orally daily, except 2 tablets (6mg) on Wednesdays and Saturdays 60 tablet 5        Review of Systems   Unable to perform ROS: Intubated     Objective:     Vital Signs (Most Recent):  Temp: 99.7 °F (37.6 °C) (08/31/23 1149)  Pulse: (!) 151 (08/31/23 1425)  Resp: 18 (08/31/23 1425)  BP: (!) 117/92 (08/31/23 1200)  SpO2: 100 % (08/31/23 1425)    Vital Signs Range (Last 24H):  Temp:  [97 °F (36.1 °C)-99.7 °F (37.6 " °C)]   Pulse:  [108-214]   Resp:  [15-39]   BP: (102-153)/(0-102)   SpO2:  [91 %-100 %]   Arterial Line BP: ()/(70-93)        Physical Exam  Vitals and nursing note reviewed.   Constitutional:       Appearance: He is ill-appearing.      Interventions: He is intubated.   HENT:      Head: Normocephalic.      Nose: Nose normal.   Eyes:      Extraocular Movements: Extraocular movements intact.      Conjunctiva/sclera: Conjunctivae normal.   Cardiovascular:      Rate and Rhythm: Normal rate.   Pulmonary:      Effort: He is intubated.   Abdominal:      General: There is no distension.   Musculoskeletal:         General: No deformity.   Skin:     General: Skin is warm.   Neurological:      Cranial Nerves: No dysarthria or facial asymmetry.      Sensory: Sensory deficit present.      Motor: Weakness present.      Comments: Exam limited due to intubation/sedation. Does not follow commands. Grimaces to pain in RUE but no movement, triple flexion to pain in LUE and BLE. + corneals/cough/gag              Neurological Exam:   LOC: intubated/sedated  Attention Span: poor  Language: Intubated  Articulation: Untestable due to intubation  Orientation: Untestable due to intubation  Pupils (CN II, III): PERRL  Gag Reflex: present  Motor: Arm left  Paresis: 1/5  Leg left  Paresis: 1/5  Arm right  Plegia 0/5  Leg right Paresis: 1/5  Sensation: Paul-hypoesthesia right      Laboratory:  CMP:   Recent Labs   Lab 08/31/23  0536 08/31/23  1046 08/31/23  1250   GLUCOSE 227*  --   --    CALCIUM 8.3*   < > 8.2*   ALBUMIN 3.1*   < > 2.8*   PROT  --    < > 7.7      < > 137   K 4.0   < > 4.0   CO2 29   < > 26   CL  --    < > 100   BUN 8.1*   < > 8   CREATININE 1.37*   < > 1.1   ALKPHOS 114   < > 95   ALT <5   < > <5*   AST 29   < > 21   BILITOT 0.8   < > 0.7    < > = values in this interval not displayed.     CBC:   Recent Labs   Lab 08/31/23  1046   WBC 12.54   RBC 3.56*   HGB 10.1*   HCT 31.7*      MCV 89   MCH 28.4   MCHC  "31.9*     Lipid Panel:   Recent Labs   Lab 08/31/23  0536   CHOL 147   HDL 42   TRIG 120     Coagulation:   Recent Labs   Lab 08/31/23  1046   INR 1.3*     Hgb A1C: No results for input(s): "HGBA1C" in the last 168 hours.    TSH:   Recent Labs   Lab 08/31/23  0536   TSH 4.954*       Diagnostic Results:    Brain/Vessel imaging:  CTA stroke multiphase 8/31/2023 1145  Impression:   Left parietal edema, nonspecific.  It is unclear whether this represents a recent infarct, focus of edema from demyelination or encephalitis, or other underlying lesion.  No midline shift or herniation.  Questionable very mild adjacent petechial hemorrhage with no focal hematoma.   No significant stenosis at the carotid bifurcations by NASCET criteria the vertebral arteries are patent.  No evidence of dissection.   No major branch stenosis/occlusion at the [id number]-gb-Pyuqjs.  No evidence of venous thrombosis.    CTA stroke multiphase 8/31/2023 0504 @ OSH   Impression:  No large vessel occlusion or flow-limiting stenosis.  No significant change from the [facility] interpretation.    CTH without contrast 8/31/2023 0453 @ OSH  Impression:  Findings suspicious for acute to subacute left parietal infarct with possible petechial hemorrhage.  No significant discrepancy with the preliminary report.      Cardiac Evaluation:   TTE 7/31/2023    LVAD: There is a Heartmate III LVAD running at 5100 RPM. The aortic valve does not open. The ventricular septum is at midline.    Left Ventricle: The left ventricle is severely dilated. Normal wall thickness. Severe global hypokinesis present. There is severely reduced systolic function with a visually estimated ejection fraction of 10 -15%. There is diastolic dysfunction.    Right Ventricle: Right ventricle was not well visualized due to poor acoustic window.    Biatrial enlargement.    Tricuspid Valve: There is moderate transvalvular regurgitation.    Estimated PASP is 33 mmHg.    IVC/SVC: Normal venous pressure " at 3 mmHg.

## 2023-08-31 NOTE — EICU
Intervention Initiated From:  Bedside    [facility] intervened regarding:  Respiratory    Nurse Notified:  Yes    Doctor Notified:  Yes    Comments: Called into pts room for an emergent intubation.  Dr. Clements orally intubated with a 7.5 ETT at a depth of 25cm at the teeth.  Prior to intubation the pt was premedicated with 20 mg Etomidate,100 mg Fentanyl, and 100mg succhs.  The procedure was tolerated well.

## 2023-08-31 NOTE — PROGRESS NOTES
Patient was seen today in the hospital. Backup controller charged and self test passed. Nurse notified to disconnect the controller from battery once charge is complete.       Equipment:  Any Equipment Issues: None noted     Emergency Bag  Emergency Equipment With Patient: Yes  Condition of emergency bag: Good  Contents of emergency bag: Backup controller, 2 fully charged batteries, 2 battery clips, reference cards    Equipment Inspection  Inspected patient's equipment today: Yes  Equipment clean and free of debris, including locking mechanism: Yes  Cleaned equipment today and educated patient on how to do this: Yes    Manual and visual inspection of driveline: No  Kinks, rescue tape, tears: No  Rescue tape applied: No  Clamshell repair: No  Perc lead repair: No    Backup Controller and Emergency Backup Battery  Backup controller serial number: [id number]  EBB S/N: [id number]  Manufacture date: 4/20/2022  EBB due to be charged: 2/2024  EBB charged today and self test completed: Yes  Self test passed:  Yes  EBB expires and due to be changed: 4/2025  EBB changed today: No    It is medically necessary to ensure patient has properly functioning equipment and wearables to prevent infection, injury or death to patient.   Waveforms sent: No

## 2023-08-31 NOTE — ASSESSMENT & PLAN NOTE
- loaded with keppra in ED prior to arrival.   - On propofol as sedation for mechanical ventilation.   - EEG ordered.   - Neurology consulted. Appreciate recommendations.

## 2023-08-31 NOTE — ASSESSMENT & PLAN NOTE
- Last Echo (7/31/2023): HM3 at 5100. AV does not open. IV septum midline. EF 10-15%. LV severely dilated. Normal wall thickness. Severe global hypokinesis. RV not well visualized due to poor acoustic window. Biatrial enlargment. Mod TR.    - Home GDMT: valsartan 40mg bid  - Home diuretic regimen: Lasix 80mg daily.   - Hypervolemic on exam today. CVP 16 from the PICC line. Start IV lasix 80mg tid and will adjust accordingly based on repsonse.   - Ionotropes: continue home milrinone 0.25.   - Strict I&Os and daily weights.   - Maintain K>4 and Mg>2

## 2023-08-31 NOTE — SUBJECTIVE & OBJECTIVE
Past Medical History:   Diagnosis Date    Acute respiratory failure with hypoxia 7/22/2023    Arthritis     Awareness alteration, transient 9/1/2022    Cardiomyopathy     CHF (congestive heart failure) 10/01/2020    COVID-19 6/3/2023    Diabetes mellitus     Dilated cardiomyopathy 10/26/2020    Drug abuse 10/2020    Headache 4/19/2023    Hyperglycemia 12/16/2022    Hyperosmolar hyperglycemic state (HHS) 5/25/2022    ICD (implantable cardioverter-defibrillator) in place 10/26/2020    Left ventricular assist device (LVAD) complication, initial encounter 6/5/2023    Muscle cramping 6/15/2022    Renal disorder     SOB (shortness of breath) 6/13/2022    Tingling in extremities 7/13/2022       Past Surgical History:   Procedure Laterality Date    APPLICATION OF WOUND VACUUM-ASSISTED CLOSURE DEVICE N/A 6/30/2022    Procedure: APPLICATION, WOUND VAC;  Surgeon: Luis F Paige MD;  Location: [facility] OR [address] [place] [address];  Service: Cardiovascular;  Laterality: N/A;  50 x 5 cm    CARDIAC DEFIBRILLATOR PLACEMENT      IMPLANTATION OF RIGHT VENTRICULAR ASSIST DEVICE (RVAD) N/A 6/29/2022    Procedure: INSERTION, RVAD;  Surgeon: Luis F Paige MD;  [facility];  Service: Cardiovascular;  Laterality: N/A;    INSERTION OF GRAFT TO PERICARDIUM Right 6/30/2022    Procedure: INSERTION-RIGHT VENTRICULAR ASSIST DEVICE;  Surgeon: Luis F Paige MD;  Location: [facility] OR 2ND FLR;  Service: Cardiovascular;  Laterality: Right;    IRRIGATION OF MEDIASTINUM  6/30/2022    Procedure: IRRIGATION, MEDIASTINUM;  Surgeon: Luis F Paige MD;  [facility];  Service: Cardiovascular;;    LEFT VENTRICULAR ASSIST DEVICE Left 6/23/2022    Procedure: INSERTION-LEFT VENTRICULAR ASSIST DEVICE;  Surgeon: Luis F Paige MD;  Location: [facility] OR [facility] FLR;  Service: Cardiovascular;  Laterality: Left;    LEFT VENTRICULAR ASSIST DEVICE N/A 6/29/2022    Procedure: INSERTION-LEFT VENTRICULAR ASSIST DEVICE;  Surgeon: Luis F Paige MD;  Location: [facility] OR [facility]  FLR;  Service: Cardiovascular;  Laterality: N/A;    RIGHT HEART CATHETERIZATION Right 2022    Procedure: INSERTION, CATHETER, RIGHT HEART;  Surgeon: Luca Lopez Jr., MD;  Location: [facility] CATH LAB;  Service: Cardiology;  Laterality: Right;    RIGHT HEART CATHETERIZATION Right 2022    Procedure: INSERTION, CATHETER, RIGHT HEART;  Surgeon: Josh Pulido MD;  Location: [facility] CATH LAB;  Service: Cardiology;  Laterality: Right;    RIGHT HEART CATHETERIZATION Right 2022    Procedure: INSERTION, CATHETER, RIGHT HEART;  Surgeon: Dalia Crum MD;  Location: [facility] CATH LAB;  Service: Cardiology;  Laterality: Right;    RIGHT HEART CATHETERIZATION Right 10/31/2022    Procedure: INSERTION, CATHETER, RIGHT HEART;  Surgeon: Dalia Crum MD;  Location: [facility] CATH LAB;  Service: Cardiology;  Laterality: Right;    STERNAL WOUND CLOSURE N/A 2022    Procedure: CLOSURE, WOUND, STERNUM;  Surgeon: Luis F Paige MD;  Location: [facility] OR [facility] FLR;  Service: Cardiovascular;  Laterality: N/A;       Review of patient's allergies indicates:   Allergen Reactions    Bumex [bumetanide] Hives    Torsemide Hives       Family History       Problem Relation (Age of Onset)    Diverticulosis Brother    Heart attack Maternal Grandmother, Maternal Grandfather    Heart failure Father          Tobacco Use    Smoking status: Former     Current packs/day: 0.00     Average packs/day: 0.5 packs/day for 16.6 years (8.3 ttl pk-yrs)     Types: Cigarettes     Start date: 10/1/2004     Quit date: 2021     Years since quittin.3    Smokeless tobacco: Never   Substance and Sexual Activity    Alcohol use: Not Currently    Drug use: Not Currently     Types: Marijuana, MDMA (Ecstacy)    Sexual activity: Yes     Partners: Female     Birth control/protection: None         Review of Systems   Unable to perform ROS: Intubated     Objective:     Vital Signs (Most Recent):  Temp: 99.7 °F (37.6 °C) (23 1149)  Pulse: (!) 151  (08/31/23 1425)  Resp: 18 (08/31/23 1425)  BP: (!) 117/92 (08/31/23 1200)  SpO2: 100 % (08/31/23 1425) Vital Signs (24h Range):  Temp:  [97 °F (36.1 °C)-99.7 °F (37.6 °C)] 99.7 °F (37.6 °C)  Pulse:  [108-214] 151  Resp:  [15-39] 18  SpO2:  [91 %-100 %] 100 %  BP: (102-153)/(0-102) 117/92  Arterial Line BP: ()/(70-93) 91/70     Weight: 95.5 kg (210 lb 8.6 oz)  Body mass index is 26.32 kg/m².      Intake/Output Summary (Last 24 hours) at 8/31/2023 1451  Last data filed at 8/31/2023 1400  Gross per 24 hour   Intake 76.63 ml   Output 450 ml   Net -373.37 ml        Physical Exam  Constitutional:       Appearance: He is ill-appearing.   HENT:      Head: Normocephalic and atraumatic.      Nose: Nose normal. No rhinorrhea.      Mouth/Throat:      Mouth: Mucous membranes are moist.      Pharynx: Oropharynx is clear.      Comments: ETT in place  Eyes:      General:         Right eye: No discharge.         Left eye: No discharge.      Conjunctiva/sclera: Conjunctivae normal.      Comments: Pinpoint pupils not reactive to threat (pt sedated)   Cardiovascular:      Rate and Rhythm: Regular rhythm. Tachycardia present.      Comments: Loud heart sounds (LVAD in place)  Pulmonary:      Breath sounds: Normal breath sounds. No wheezing, rhonchi or rales.   Abdominal:      General: Abdomen is flat.      Palpations: Abdomen is soft. There is no mass.      Hernia: No hernia is present.   Musculoskeletal:         General: No swelling.      Right lower leg: No edema.      Left lower leg: No edema.   Skin:     General: Skin is warm and dry.   Neurological:      Comments: Intubated and sedated. No gag reflex. Does not withdrawal from pain.          Vents:  Vent Mode: A/C (08/31/23 1425)  Ventilator Initiated: Yes (08/31/23 1224)  Set Rate: 15 BPM (08/31/23 1425)  Vt Set: 530 mL (08/31/23 1425)  PEEP/CPAP: 5 cmH20 (08/31/23 1425)  Oxygen Concentration (%): 50 (08/31/23 1425)  Peak Airway Pressure: 22 cmH20 (08/31/23 1425)  Plateau  Pressure: 0 cmH20 (08/31/23 1425)  Total Ve: 8.1 L/m (08/31/23 1425)  Negative Inspiratory Force (cm H2O): 0 (08/31/23 1425)  F/VT Ratio<105 (RSBI): (!) 33.27 (08/31/23 1425)    Lines/Drains/Airways       Peripherally Inserted Central Catheter Line  Duration             PICC Double Lumen 08/01/23 30 days    PICC Double Lumen 08/01/23 1121 right basilic 30 days              Drain  Duration                  NG/OG Tube 08/31/23 1239 [place] sump 18 Fr. Center mouth <1 day         Urethral Catheter 08/31/23 1230 <1 day              Airway  Duration                  Airway - Non-Surgical 08/31/23 1224 Endotracheal Tube <1 day       Airway Anesthesia 08/31/23 <1 day              Arterial Line  Duration             Arterial Line 08/31/23 1239 Right Radial <1 day              Line  Duration                  VAD 06/29/22 1115 Left ventricular assist device HeartMate 3 428 days              Peripheral Intravenous Line  Duration                  Peripheral IV - Single Lumen 08/31/23 1200 20 G Left Forearm <1 day         Peripheral IV - Single Lumen 08/31/23 18 G Left Antecubital <1 day                    Significant Labs:    CBC/Anemia Profile:  Recent Labs   Lab 08/31/23  0449 08/31/23  0536 08/31/23  1046   WBC  --  11.55* 12.54   HGB  --  11.6* 10.1*   HCT 43 36.9* 31.7*   PLT  --  304 244   MCV  --  90.4 89   RDW  --  20.2* 19.8*        Chemistries:  Recent Labs   Lab 08/31/23  0536 08/31/23  1046 08/31/23  1250    140 137   K 4.0 3.6 4.0   CL  --  100 100   CO2 29 30* 26   BUN 8.1* 8 8   CREATININE 1.37* 1.1 1.1   CALCIUM 8.3* 8.3* 8.2*   ALBUMIN 3.1* 2.9* 2.8*   PROT  --  7.9 7.7   BILITOT 0.8 0.7 0.7   ALKPHOS 114 101 95   ALT <5 <5* <5*   AST 29 22 21   GLUCOSE 227*  --   --        All pertinent labs within the past 24 hours have been reviewed.    Significant Imaging:   I have reviewed all pertinent imaging results/findings within the past 24 hours.

## 2023-08-31 NOTE — ASSESSMENT & PLAN NOTE
Endocrinology consulted for BG management.   BG goal 140-180    - Levemir (Insulin Detemir) 9 units BID  - Novolog (Insulin Aspart) prn for BG excursions [facility] [place] (150/25)  - BG checks q4hr  - Hypoglycemia protocol in place  - If blood glucose greater than 300, please ask patient not to eat food or drink anything other than water until correctional insulin has brought it back below 250    ** Please notify Endocrine for any change and/or advance in diet**  ** Please call Endocrine for any BG related issues **    Discharge Planning:   TBD. Please notify endocrinology prior to discharge.

## 2023-08-31 NOTE — HPI
Mr. 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 He was reportedly not taking his coumadin or keppra for the past week due to running out. As per chart review, patient started to experience symptoms around 1am when he went to sleep. Symptoms worsened and EMS was called around 3:30am. Upon admission patient underwent a CT head showed an acute to subacute infarct with possible petechial hemorrhage. Labs were significant for INR of 1.2. Patient was loaded with Keppra and [place] [facility] [place]      Upon arrival, patient was found to be hemodynamically stable w/ MAPs of 106. However, he was noted to be not alert or oriented and extremely lethargic. Patient was noted to have GCS 10-11 with minimally reactive pupils. Patient underwent stat CT brain which showed no acute changes since prior CT, and transferred to the ICU for closer monitoring. Once in the ICU patient was intubated for airway protection.      Of note, patient was most recently hospitalized from 7/22/2023-8/8/2023 for sepsis/covod. Found to have loculated pleural effussion requiring chest tube placement and three doses of lytics. Effussion improved and patient was discharged with 6 week course of Ancef (to end on 9/14).        Home Medications:    Aspirin 81mg daily   Buspirone 10mg bid   Furosemide 80mg daily   Insulin detemir 25 units bid   Insulin aspart 14 units w/ meals   Milrinone 0.25   Mirtazapine 15mg nightly   Valsartan 40mg bi   Warfarin      Cardiac Imaging:    Echo (7/31/2023): HM3 at 5100. AV does not open. IV septum midline. EF 10-15%. LV severely dilated. Normal wall thickness. Severe global hypokinesis. RV not well visualized due to poor acoustic window.  Biatrial enlargment. Mod TR.

## 2023-08-31 NOTE — ED PROVIDER NOTES
Encounter Date: 8/31/2023    SCRIBE #1 NOTE: I, Luis Javed, am scribing for, and in the presence of,  Rodger Yadav IV, MD. I have scribed the following portions of the note - Other sections scribed: HPI, ROS, PE.       History     Chief Complaint   Patient presents with    Altered Mental Status     LVAD pt presents via aasi wife called because pt was having trouble speaking and weak     38 year old male with pmhx of CHF, DM, drug abuse, medication noncompliance, cardiomyopathy s/p LVAD, and seizures presents to ED via EMS for altered mental status, last known normal 0200 tonight. Per EMS, wife realized pt was unable to speak called EMS. EMS reports pt was able to stand up from bed on floor and pivot onto stretcher. EMS states pt was not following commands and had tremors in both hands. Pt does not contribute to history    The history is provided by the EMS personnel. The history is limited by the condition of the patient. No  was used.   Altered Mental Status  This is a new problem. The current episode started 1 to 2 hours ago. The problem occurs constantly. The problem has not changed since onset.He has tried nothing for the symptoms.     Review of patient's allergies indicates:   Allergen Reactions    Bumex [bumetanide] Hives    Torsemide Hives     Past Medical History:   Diagnosis Date    Acute respiratory failure with hypoxia 7/22/2023    Arthritis     Awareness alteration, transient 9/1/2022    Cardiomyopathy     CHF (congestive heart failure) 10/01/2020    COVID-19 6/3/2023    Diabetes mellitus     Dilated cardiomyopathy 10/26/2020    Drug abuse 10/2020    Headache 4/19/2023    Hyperglycemia 12/16/2022    Hyperosmolar hyperglycemic state (HHS) 5/25/2022    ICD (implantable cardioverter-defibrillator) in place 10/26/2020    Left ventricular assist device (LVAD) complication, initial encounter 6/5/2023    Muscle cramping 6/15/2022    Renal disorder     SOB (shortness of breath)  6/13/2022    Tingling in extremities 7/13/2022     Past Surgical History:   Procedure Laterality Date    APPLICATION OF WOUND VACUUM-ASSISTED CLOSURE DEVICE N/A 6/30/2022    Procedure: APPLICATION, WOUND VAC;  Surgeon: Luis F Paige MD;  [facility];  Service: Cardiovascular;  Laterality: N/A;  50 x 5 cm    CARDIAC DEFIBRILLATOR PLACEMENT      IMPLANTATION OF RIGHT VENTRICULAR ASSIST DEVICE (RVAD) N/A 6/29/2022    Procedure: INSERTION, RVAD;  Surgeon: Luis F Paige MD;  Location: [facility] OR [facility] FLR;  Service: Cardiovascular;  Laterality: N/A;    INSERTION OF GRAFT TO PERICARDIUM Right 6/30/2022    Procedure: INSERTION-RIGHT VENTRICULAR ASSIST DEVICE;  Surgeon: Luis F Paige MD;  [facility];  Service: Cardiovascular;  Laterality: Right;    IRRIGATION OF MEDIASTINUM  6/30/2022    Procedure: IRRIGATION, MEDIASTINUM;  Surgeon: Luis F Paige MD;  [facility];  Service: Cardiovascular;;    LEFT VENTRICULAR ASSIST DEVICE Left 6/23/2022    Procedure: INSERTION-LEFT VENTRICULAR ASSIST DEVICE;  Surgeon: Luis F Paige MD;  [facility];  Service: Cardiovascular;  Laterality: Left;    LEFT VENTRICULAR ASSIST DEVICE N/A 6/29/2022    Procedure: INSERTION-LEFT VENTRICULAR ASSIST DEVICE;  Surgeon: Luis F Paige MD;  Location: [facility] OR [facility];  Service: Cardiovascular;  Laterality: N/A;    RIGHT HEART CATHETERIZATION Right 4/8/2022    Procedure: INSERTION, CATHETER, RIGHT HEART;  Surgeon: Luca Lopez Jr., MD;  Location: [facility] CATH LAB;  Service: Cardiology;  Laterality: Right;    RIGHT HEART CATHETERIZATION Right 4/19/2022    Procedure: INSERTION, CATHETER, RIGHT HEART;  Surgeon: Josh Pulido MD;  Location: [facility] CATH LAB;  Service: Cardiology;  Laterality: Right;    RIGHT HEART CATHETERIZATION Right 7/21/2022    Procedure: INSERTION, CATHETER, RIGHT HEART;  Surgeon: Dalia Crum MD;  Location: [facility] CATH LAB;  Service: Cardiology;  Laterality: Right;     RIGHT HEART CATHETERIZATION Right 10/31/2022    Procedure: INSERTION, CATHETER, RIGHT HEART;  Surgeon: Dalia Crum MD;  Location: [facility] CATH LAB;  Service: Cardiology;  Laterality: Right;    STERNAL WOUND CLOSURE N/A 2022    Procedure: CLOSURE, WOUND, STERNUM;  Surgeon: Luis F Paige MD;  Location: [facility] OR 2ND FLR;  Service: Cardiovascular;  Laterality: N/A;     Family History   Problem Relation Age of Onset    Heart failure Father     Diverticulosis Brother     Heart attack Maternal Grandmother     Heart attack Maternal Grandfather      Social History     Tobacco Use    Smoking status: Former     Current packs/day: 0.00     Average packs/day: 0.5 packs/day for 16.6 years (8.3 ttl pk-yrs)     Types: Cigarettes     Start date: 10/1/2004     Quit date: 2021     Years since quittin.3    Smokeless tobacco: Never   Substance Use Topics    Alcohol use: Not Currently    Drug use: Not Currently     Types: Marijuana, MDMA (Ecstacy)     Review of Systems   Unable to perform ROS: Mental status change       Physical Exam     Initial Vitals [23 0438]   BP Pulse Resp Temp SpO2   (!) 153/102 110 (!) 22 97 °F (36.1 °C) 100 %      MAP       --         Physical Exam    Nursing note and vitals reviewed.  Constitutional: He is not diaphoretic. No distress.   Cardiovascular:  Normal rate and regular rhythm.           LVAD in place  Mechanical hum   Warm and well-perfused distally  LVAD all within normal limits   Pulmonary/Chest: No respiratory distress.   Abdominal: Abdomen is soft. He exhibits no distension. There is no abdominal tenderness.     Neurological: He is alert.   Alert, aphasic  No obvious cranial nerve deficits   Intermittently following commands  RUE drift, LUE 5/5  No effort against gravity bilateral LE     Psychiatric: He has a normal mood and affect.         ED Course   Critical Care    Date/Time: 2023 5:18 AM    Performed by: Rodger Yadav IV, MD  Authorized by: Rodger Yadav IV, MD   Total critical care time (exclusive of procedural time) : 35 minutes  Critical care time was exclusive of separately billable procedures and treating other patients.  Critical care was necessary to treat or prevent imminent or life-threatening deterioration of the following conditions: CNS failure or compromise.  Critical care was time spent personally by me on the following activities: development of treatment plan with patient or surrogate, discussions with consultants, discussions with primary provider, evaluation of patient's response to treatment, examination of patient, obtaining history from patient or surrogate, pulse oximetry, re-evaluation of patient's condition, review of old charts, ordering and review of radiographic studies, ordering and review of laboratory studies and ordering and performing treatments and interventions.        Labs Reviewed   ISTAT CHEM8 - Abnormal; Notable for the following components:       Result Value    POC Glucose 231 (*)     POC Potassium 3.4 (*)     POC TCO2 (MEASURED) 31 (*)     POC Anion Gap 17 (*)     POC Ionized Calcium 0.99 (*)     All other components within normal limits   CBC W/ AUTO DIFFERENTIAL    Narrative:     The following orders were created for panel order CBC W/ AUTO DIFFERENTIAL.  Procedure                               Abnormality         Status                     ---------                               -----------         ------                     CBC with Differential[[id number]]                                                         Please view results for these tests on the individual orders.   COMPREHENSIVE METABOLIC PANEL   PROTIME-INR   TSH   LIPID PANEL   CBC WITH DIFFERENTIAL   DRUG SCREEN, URINE (BEAKER)   POCT GLUCOSE, HAND-HELD DEVICE   POCT GLUCOSE, HAND-HELD DEVICE   POCT PT/INR   ISTAT CHEM8          Imaging Results              CTA STROKE MULTI-PHASE (Preliminary result)  Result time 08/31/23 05:04:04      Preliminary result by Markell  John PEOPLES Jr., MD (08/31/23 05:04:04)                   Narrative:    START OF REPORT:  TECHNIQUE: CT ANGIOGRAM OF THE INTRACRANIAL VESSELS WAS PERFORMED WITH INTRAVENOUS CONTRAST WITH DIRECT AXIAL AS WELL AS SAGITTAL AND CORONAL REFORMATIONS. CT ANGIOGRAM OF THE NECK VESSELS WAS PERFORMED WITH INTRAVENOUS CONTRAST WITH DIRECT AXIAL AS WELL AS SAGITTAL AND CORONAL REFORMATIONS.    COMPARISON: COMPARISON IS WITH NONCONTRAST CT STUDY DATED 2023-08-31 04:49:15.    CLINICAL HISTORY: AMS, CONFUSION, RIGHT SIDE WEAKNESS.    Findings:  Intracranial Vascular structures:  Internal carotid arteries: Unremarkable.  Middle cerebral arteries: Unremarkable.  Anterior cerebral arteries: Unremarkable.  Vertebral arteries: The vertebrobasilar junction is unremarkable. Both vertebral arteries are patent and normal in caliber.  Basilar artery: Unremarkable.  Posterior cerebral arteries: Unremarkable.  Neck Vascular structures: The visualized aorta and origin of the great vessels of the neck appear unremarkable.  Carotids:  Common carotid arteries: There is mild atheromatous calcification of both common carotid arteries at their origin without stenosis.  Internal carotid artery: The right and the left internal carotid arteries appear unremarkable.  Vertebral arteries: The origins of both vertebral arteries are unremarkable. Both vertebral arteries are patent and normal in caliber.  Brain parenchyma: No abnormal brain parenchymal enhancement is seen.    Miscellaneous: There is a loculated pleural effusion along the left major fissure (series 11, images 118-125). There are median sternotomy wires with coronary artery calcification and associated mediastinal surgical clips, likely related to prior coronary artery bypass graft surgery. A cardiac pacemaker is seen in the left chest wall with incompletely imaged pacer leads.    Notifications: The results were discussed with the emergency room physician Dr. Yadav prior to dictation at  2023-08-31 05:19:36 CDT.      Impression:  1. Unremarkable CT angiogram of the head and neck. Details and findings as noted above.                                         CT HEAD FOR STROKE (Preliminary result)  Result time 08/31/23 04:53:53   Procedure changed from CT Head Without Contrast     Preliminary result by John Guillaume Jr., MD (08/31/23 04:53:53)                   Narrative:    START OF REPORT:  TECHNIQUE: CT OF THE HEAD WAS PERFORMED WITHOUT INTRAVENOUS CONTRAST WITH AXIAL AS WELL AS CORONAL AND SAGITTAL IMAGES.    COMPARISON: COMPARISON IS WITH STUDY DATED 2023-04-19 00:19:16.    DOSAGE INFORMATION: AUTOMATED EXPOSURE CONTROL WAS UTILIZED.    CLINICAL HISTORY: CODE STROKE, AMS, CONFUSION, RIGHT SIDE WEAKNESS.    Findings:  Hemorrhage: An ill-defined hypodensity is seen in the left high parietal lobe (series 2, image 47), new since the prior examination. This may reflect an acute/ subacute infarct.  CSF spaces: The ventricles sulci and basal cisterns are within normal limits.  Brain parenchyma: An ill-defined hypodensity is seen in the left posterior parietal lobe (series 2, images 42-50), new since the prior examination. There is mild surrounding mass effect with effacement of the convexity sulci. There are subtle bands of hyperdensity in the regional subcortical zones. Findings suggests an acute to subacute infarct with possible petechial hemorrage. The presence of some mass effect suggests an older than 6 hour event.  Cerebellum: Unremarkable.  Sella and skull base: The sella appears to be within normal limits for age.  Herniation: None.  Intracranial calcifications: Incidental note is made of bilateral choroid plexus calcification. Incidental note is made of some pineal region calcification.  Calvarium: No acute linear or depressed skull fracture is seen.    Maxillofacial Structures:  Paranasal sinuses: The visualized paranasal sinuses appear clear with no mucoperiosteal thickening or air  fluid levels identified.  Orbits: The orbits appear unremarkable.  Zygomatic arches: The zygomatic arches are intact and unremarkable.  Temporal bones and mastoids: The temporal bones and mastoids appear unremarkable.  TMJ: The mandibular condyles appear normally placed with respect to the mandibular fossa.    Notifications: This is a stroke protocol report and the results were discussed with the emergency room physicia Dr. Leif hansen prior to dictation at 2023-08-31 05:01:48 CDT.      Impression:  1. An ill-defined hypodensity is seen in the left posterior parietal lobe (series 2, images 42-50), new since the prior examination. There is mild surrounding mass effect with effacement of the convexity sulci. There are subtle bands of hyperdensity in the regional subcortical zones. Findings suggests an acute to subacute infarct with possible petechial hemorrage. The presence of some mass effect suggests an older than 6 hour event.  2. Details and other findings as noted above.                                         Medications   milrinone 20mg in D5W 100 mL infusion (has no administration in time range)   levETIRAcetam in NaCl (iso-os) IVPB 2,000 mg (2,000 mg Intravenous New Bag 8/31/23 8392)   iopamidoL (ISOVUE-370) injection 50 mL (50 mLs Intravenous Given 8/31/23 7666)     Medical Decision Making  Problems Addressed:  Acute CVA (cerebrovascular accident): acute illness or injury that poses a threat to life or bodily functions  Aphasia: acute illness or injury that poses a threat to life or bodily functions  Right sided weakness: acute illness or injury that poses a threat to life or bodily functions    Amount and/or Complexity of Data Reviewed  Labs: ordered.  Radiology: ordered.    Risk  Prescription drug management.            Scribe Attestation:   Scribe #1: I performed the above scribed service and the documentation accurately describes the services I performed. I attest to the accuracy of the note.    Attending  Attestation:           Physician Attestation for Scribe:  Physician Attestation Statement for Scribe #1: I, Rodger Yadav IV, MD, reviewed documentation, as scribed by Luis Javed in my presence, and it is both accurate and complete.         ED assessment:    37 yo with history above, LVAD, noncompliant with coumadin/keppra  Presenting for acute onset aphasia, right sided weakness  Exam as above, NIHSS15  CT confirms subacute infarct, possible petechial hemorrhage, not TNK or ASA candidate   Patient is well-perfused and LVAD seems to be working properly  Discussed with transplant coordinator and Dr. Marques at [facility] who has accepted transfer  Patient having some partial seizure activity but alert awake protecting his airway here will load with Keppra he is likely noncompliant with his home Keppra as well  Stable for transfer at this time    Differential diagnosis (including but not limited to):   Cva, ich, tia, electrolyte derangment, seizure     ED management:   Stroke activation  Consideration of thrombolytics  LVAD team consultation at [facility]  Transfer    My independent radiology interpretation:   CT head - edema L posterior parietal c/w infarct     Amount and/or Complexity of Data Reviewed  Independent historian: EMS   Summary of history: see HPI  External data reviewed: notes from previous admissions, notes from previous ED visits, notes from clinic visits, notes from outside facilities (through [facility]), prior labs, prior EKGs, prior imaging, and prescription medications   Risk and benefits of testing: discussed   Labs: ordered and reviewed  Radiology: ordered and independent interpretation performed (see above or ED course)  ECG/medicine tests: ordered and independent interpretation performed (see above or ED course)  Discussion of management or test interpretation with external provider(s): discussed with LVAD team Dr. Marques in [place] consultant and discussed  with accepting physician for transfer   Summary of discussion: see ED course     Risk  Drug therapy requiring intense monitoring for toxicity   Decision regarding transfer     Critical Care  30-74 minutes     I, Rodger Yadav MD personally performed the history, PE, MDM, and procedures as documented above and agree with the scribe's documentation.        ED Course as of 08/31/23 0551   Thu Aug 31, 2023   0441 37 yo with history of LVAD, noncompliance with medications presenting for acute onset aphasia.  On my exam patient is awake and alert he is intermittently following commands but he is nonverbal aphasic at this time.  On lower extremities he has no effort against gravity he has drift in R upper extremity no obvious cranial nerve deficits.  Last known normal was about 2:00 a.m. per report.  Code stroke activated CT head will check POC INR may TNK candidate if he is noncompliant with his anticoagulation which has been referenced in prior notes [AC]   0444 NIHSS14 [AC]   0505 Received a call from radiologist stating that the CT noncontrast shows some edema in left posterior parietal lobe concerning for a subacute infarct outside the window for thrombolytics also there is some area concerning for petechial bleeding.  Given this patient is not a candidate for thrombo lysis.  Still awaiting CT results will contact transplant coordinator at this time to update and for recommendations anticipate transfer [AC]   0516 Robby Feliz LVAD coordinator at [facility] recommends immediate transfer initiation even if patient does have a large vessel occlusion that may requiring intervention on CTA she recommends that we transfer for intervention at [facility] [AC]   0527 EKG NSR at rate of 91, artifact from LVAD. No obvious isvhemic changes  [AC]   0535 Dr. Marques accepted transfer at [facility]  [AC]   0539 Brought to my attention patient's seems to be having some focal seizure activity he also was confirmed by his mom  that he is noncompliant with his warfarin likely noncompliant with Keppra as well will load with Keppra continue to monitor [AC]   0570 Zeynep with stroke neurology will evaluate prior to transfer  [AC]      ED Course User Index  [AC] Rodger Yadav IV, MD                    Clinical Impression:   Final diagnoses:  [I63.9] Stroke  [I63.9] Acute CVA (cerebrovascular accident) (Primary)  [R47.01] Aphasia  [R53.1] Right sided weakness  [Z95.811] LVAD (left ventricular assist device) present        ED Disposition Condition    Transfer to Another Facility Stable                       Rodger Yadav IV, MD  08/31/23 0552       Rodger Yadav IV, MD  08/31/23 0566

## 2023-08-31 NOTE — ASSESSMENT & PLAN NOTE
Presented with altered mental status at mcg/kg/min OSH and found to have stroke. Transferred to [facility] due to LVAD history and vomited on arrival. Possible seizures. Intubated for airway protection. CXR with cardiomegaly however poor penetration so difficult to assess pulmonary edema.  Last EF 10-15% s/p LVAD. No history recent respiratory infection, ARDS, COPD, asthma. Does not appear to have acute insult to lungs.    ABG pH 7.49, CO2 37.1, O2 > 400 on FiO2 100%. Most recent serum CO2 30.   At bedside, tidal volume 560, RR 20, PEEP 5, FiO2 50%. Satting in high 90's. VAD pt.  Intubated and sedated with propofol and fentanyl    - Recommend continuing RR of 12-20 bpm and TV of ~550 mL based on 6-8 mL/kg IBW (84 kg)  - Recommend continuing PEEP of 5, no recent hx atelectasis/ARDS/resp infxn  - Recommend decreasing FiO2 as tolerated for SaO2 > 88%  - If O2 requirements increase, recommend repeat CXR due to aspiration risk. No c/f aspiration pneumonitis at this time.  - Recommend suctioning for secretion management  - Recommend famotidine or pantoprazole for gastric ulcer ppx while intubated  - Recommend neuro workup prior to SAT/SBT

## 2023-08-31 NOTE — CODE/ RAPID DOCUMENTATION
"  RAPID RESPONSE NURSE STROKE CODE NOTE         Admit Date: 2023  LOS: 0  Code Status: Full Code   Date of Consult: 2023  : 1985  Age: 38 y.o.  Weight:   Wt Readings from Last 1 Encounters:   23 95.5 kg (210 lb 8.6 oz)     Sex: male  Race: Black or    Bed: 14184/73814 A:   MRN: [id number]  Time Rapid Response Team page Received: 1105  Time Rapid Response Team at Bedside: 1110  Time Rapid Response Team left Bedside: 1149  Was the patient discharged from an ICU this admission? No   Was the patient discharged from a PACU within last 24 hours? No   Did the patient receive conscious sedation/general anesthesia in last 24 hours? No  Was the patient in the ED within the past 24 hours? No  Was the patient on NIPPV within the past 24 hours? No   Did this progress into an ARC or CPA: No  Attending Physician: Luca Lopez Jr.*  Primary Service: [facility]ANS[facility] TEAM 2       SITUATION    Notified by pager.  Called to evaluate the patient for Neuro    BACKGROUND    Why is the patient in the hospital?: <principal problem not specified>  Patient has a past medical history of Acute respiratory failure with hypoxia, Arthritis, Awareness alteration, transient, Cardiomyopathy, CHF (congestive heart failure), COVID-19, Diabetes mellitus, Dilated cardiomyopathy, Drug abuse, Headache, Hyperglycemia, Hyperosmolar hyperglycemic state (HHS), ICD (implantable cardioverter-defibrillator) in place, Left ventricular assist device (LVAD) complication, initial encounter, Muscle cramping, Renal disorder, SOB (shortness of breath), and Tingling in extremities.    ASSESSMENT    Last VS: BP (!) 117/92 (BP Location: Left arm, Patient Position: Lying)   Pulse (!) 138   Temp 99.7 °F (37.6 °C) (Oral)   Resp (!) 34   Ht 6' 3" (1.905 m)   Wt 95.5 kg (210 lb 8.6 oz)   SpO2 98%   BMI 26.32 kg/m²     24H Vital Sign Range:  Temp:  [97 °F (36.1 °C)-99.7 °F (37.6 °C)]   Pulse:  [110-144]   Resp:  [15-39] "   BP: (102-153)/(0-102)   SpO2:  [91 %-100 %]   Arterial Line BP: (108)/(87)     Last know well time:      Glucose time: 1037   Glucose result: 208    Physical Exam  Constitutional:       Appearance: He is ill-appearing.   Eyes:      Pupils: Pupils are equal, round, and reactive to light.   Cardiovascular:      Comments: LVAD  Pulmonary:      Effort: Pulmonary effort is normal.   Skin:     General: Skin is warm and dry.   Neurological:      Motor: Weakness present.     No new neurological changes noted. Diagnosed with CVA on admit. Pt is lethargic. Arouses to pain, attempts verbal response, words are unintelligible; does not follow commands. Maintaining airway, PERRLA    Time Stroke Code initiated: 1105  Stroke team Arrival time: 1110  Stroke Code activation triggers: previous stroke    Time arrived at CT: 1112  Time CT completed: 1121    VAN positive or negative: positive      RECOMMENDATIONS    We recommend:     - Transfer to critical care    FOLLOW-UP/PLAN    Call the Rapid Response Nurse, Lamar Molina, RN at [phone] for additional questions or concerns.    PHYSICIAN ESCALATION    Orders received and case discussed with Dr. Herndon    Disposition: Tx in ICU bed 65317.

## 2023-08-31 NOTE — CONSULTS
Acute Stroke Code  Triage and Medical Decision Making        38 y.o.male presenting with PMHx of CHF, DM, drug abuse, medication noncompliance, cardiomyopathy s/p LVAD, and seizures. [place] [facility] [place] acute/subacute stroke. Code stroke called for acute AMS changes.     There is delay in the Imaging due to patient was vomiting and unable to lay down.       Acute Stroke Times   Last Known Normal Date: 08/31/23  Last Known Normal Time: 1050  Symptom Onset Date: 08/31/23  Symptom Onset Time: 1100  Stroke Team Called Date: 08/31/23  Stroke Team Called Time: 1113  CT Interpretation Time: 1145  Thrombolytic Therapy Recommended: No    NIH Scale:  1a. Level of Consciousness: 0-->Alert, keenly responsive  1b. LOC Questions: 2-->Answers neither question correctly  1c. LOC Commands: 1-->Performs one task correctly  2. Best Gaze: 0-->Normal  3. Visual: 0-->No visual loss  4. Facial Palsy: 0-->Normal symmetrical movements  5a. Motor Arm, Left: 2-->Some effort against gravity, limb cannot get to or maintain (if cued) 90 (or 45) degrees, drifts down to bed, but has some effort against gravity  5b. Motor Arm, Right: 2-->Some effort against gravity, limb cannot get to or maintain (if cued) 90 (or 45) degrees, drifts down to bed, but has some effort against gravity  6a. Motor Leg, Left: 2-->Some effort against gravity, leg falls to bed by 5 secs, but has some effort against gravity  6b. Motor Leg, Right: 2-->Some effort against gravity, leg falls to bed by 5 secs, but has some effort against gravity  7. Limb Ataxia: 0-->Absent  8. Sensory: 0-->Normal, no sensory loss  9. Best Language: 3-->Mute, global aphasia, no usable speech or auditory comprehension  10. Dysarthria: (UN) Intubated or other physical barrier  11. Extinction and Inattention (formerly Neglect): 0-->No abnormality  Total (NIH Stroke Scale): 14     Modified Huma    Candie Coma Scale:    ABCD2 Score:    [id number] Score:   HAS -BLED Score:   ICH Score:    Renteria & Arce Classification:      Acute Imaging Interpretation:   Repeat CTH : No new acute findings , L hemisphere hypoattenuaiton with possible  petechial hemorrhagic changes     Repeated CTA H&N : no LVO , stenosis.      Thrombolysis Candidate? No, Recent moderate to severe stroke (< 3 months)  Delays to Thrombolysis?  Not Applicable    Interventional Revascularization Candidate?   Is the patient eligible for mechanical endovascular reperfusion (SIERRA)?  No; No large vessel occlusion identified on imaging   Delays to Thrombectomy? Not Applicable    Hemorrhagic change of an Ischemic Stroke: Does this patient have an ischemic stroke with hemorrhagic changes? Yes, Grading Scale: HI Type 1 (HI-1) = small petechiae along the margins of the infarct. Is this a symptomatic change?  No - Hemorrhage is not clinically significant     Full consult note to follow.

## 2023-08-31 NOTE — ANESTHESIA PROCEDURE NOTES
Ad Hoc Intubation    Date/Time: 8/31/2023 12:20 PM    Performed by: Alfred Carlisle MD  Authorized by: Marco Sotelo MD    Indications:  Respiratory distress  Diagnosis:  CVA  Patient Location:  ICU  Timeout:  8/31/2023 12:16 PM  Procedure Start Time:  8/31/2023 12:17 PM  Procedure End Time:  8/31/2023 12:25 PM  Staff:     Anesthesiologist Present: Yes    Intubation:     Induction:  Intravenous    Intubated:  Postinduction    Mask Ventilation:  Easy mask    Attempts:  1    Attempted By:  Resident anesthesiologist    Method of Intubation:  Video laryngoscopy    Blade:  Webster 3    Laryngeal View Grade: Grade I - full view of chords      Difficult Airway Encountered?: No      Complications:  None    Airway Device:  Oral endotracheal tube    Airway Device Size:  7.5    Style/Cuff Inflation:  Cuffed (inflated to minimal occlusive pressure)    Tube secured:  25    Secured at:  The teeth    Placement Verified By:  Colorimetric ETCO2 device    Complicating Factors:  None    Findings Post-Intubation:  BS equal bilateral and atraumatic/condition of teeth unchanged

## 2023-08-31 NOTE — SUBJECTIVE & OBJECTIVE
Past Medical History:   Diagnosis Date    Acute respiratory failure with hypoxia 7/22/2023    Arthritis     Awareness alteration, transient 9/1/2022    Cardiomyopathy     CHF (congestive heart failure) 10/01/2020    COVID-19 6/3/2023    Diabetes mellitus     Dilated cardiomyopathy 10/26/2020    Drug abuse 10/2020    Headache 4/19/2023    Hyperglycemia 12/16/2022    Hyperosmolar hyperglycemic state (HHS) 5/25/2022    ICD (implantable cardioverter-defibrillator) in place 10/26/2020    Left ventricular assist device (LVAD) complication, initial encounter 6/5/2023    Muscle cramping 6/15/2022    Renal disorder     SOB (shortness of breath) 6/13/2022    Tingling in extremities 7/13/2022       Past Surgical History:   Procedure Laterality Date    APPLICATION OF WOUND VACUUM-ASSISTED CLOSURE DEVICE N/A 6/30/2022    Procedure: APPLICATION, WOUND VAC;  Surgeon: Luis F Paige MD;  Location: [facility] OR [address] [place] [address];  Service: Cardiovascular;  Laterality: N/A;  50 x 5 cm    CARDIAC DEFIBRILLATOR PLACEMENT      IMPLANTATION OF RIGHT VENTRICULAR ASSIST DEVICE (RVAD) N/A 6/29/2022    Procedure: INSERTION, RVAD;  Surgeon: Luis F Paige MD;  [facility];  Service: Cardiovascular;  Laterality: N/A;    INSERTION OF GRAFT TO PERICARDIUM Right 6/30/2022    Procedure: INSERTION-RIGHT VENTRICULAR ASSIST DEVICE;  Surgeon: Luis F Paige MD;  Location: [facility] OR 2ND FLR;  Service: Cardiovascular;  Laterality: Right;    IRRIGATION OF MEDIASTINUM  6/30/2022    Procedure: IRRIGATION, MEDIASTINUM;  Surgeon: Luis F Paige MD;  [facility];  Service: Cardiovascular;;    LEFT VENTRICULAR ASSIST DEVICE Left 6/23/2022    Procedure: INSERTION-LEFT VENTRICULAR ASSIST DEVICE;  Surgeon: Luis F Paige MD;  Location: [facility] OR [facility] FLR;  Service: Cardiovascular;  Laterality: Left;    LEFT VENTRICULAR ASSIST DEVICE N/A 6/29/2022    Procedure: INSERTION-LEFT VENTRICULAR ASSIST DEVICE;  Surgeon: Luis F Paige MD;  Location: [facility] OR [facility]  FLR;  Service: Cardiovascular;  Laterality: N/A;    RIGHT HEART CATHETERIZATION Right 4/8/2022    Procedure: INSERTION, CATHETER, RIGHT HEART;  Surgeon: Luca Lopez Jr., MD;  Location: [facility] CATH LAB;  Service: Cardiology;  Laterality: Right;    RIGHT HEART CATHETERIZATION Right 4/19/2022    Procedure: INSERTION, CATHETER, RIGHT HEART;  Surgeon: Josh Pulido MD;  Location: [facility] CATH LAB;  Service: Cardiology;  Laterality: Right;    RIGHT HEART CATHETERIZATION Right 7/21/2022    Procedure: INSERTION, CATHETER, RIGHT HEART;  Surgeon: Dalia Crum MD;  Location: [facility] CATH LAB;  Service: Cardiology;  Laterality: Right;    RIGHT HEART CATHETERIZATION Right 10/31/2022    Procedure: INSERTION, CATHETER, RIGHT HEART;  Surgeon: Dalia Crum MD;  Location: [facility] CATH LAB;  Service: Cardiology;  Laterality: Right;    STERNAL WOUND CLOSURE N/A 6/30/2022    Procedure: CLOSURE, WOUND, STERNUM;  Surgeon: Luis F Paige MD;  Location: [facility] OR [facility] FLR;  Service: Cardiovascular;  Laterality: N/A;       Review of patient's allergies indicates:   Allergen Reactions    Bumex [bumetanide] Hives    Torsemide Hives       Current Neurological Medications:     No current facility-administered medications on file prior to encounter.     Current Outpatient Medications on File Prior to Encounter   Medication Sig    aspirin (ECOTRIN) 81 MG EC tablet Take 1 tablet (81 mg total) by mouth once daily.    blood sugar diagnostic Strp Use to test blood glucose 4 (four) times daily.    blood-glucose meter Misc Use as instructed    busPIRone (BUSPAR) 10 MG tablet Take 10 mg by mouth 2 (two) times daily.    dextrose 5 % (D5W) SolP 500 mL with ceFAZolin 1 gram SolR 8 g per day IV continuously    furosemide (LASIX) 80 MG tablet Take 1 tablet (80 mg total) by mouth once daily.    insulin aspart U-100 (NOVOLOG) 100 unit/mL (3 mL) InPn pen Inject 14 Units into the skin 3 (three) times daily with meals. Plus Sliding Scale: 151-200: +1,  "201-250: +2, 251-300: +3, 301-350: +4 and call MD    insulin detemir U-100, Levemir, 100 unit/mL (3 mL) SubQ InPn pen Inject 24 Units into the skin 2 (two) times daily. (Patient not taking: Reported on 8/9/2023)    lancets 33 gauge Misc Use to test blood glucose 4 (four) times daily.    levETIRAcetam (KEPPRA) 1000 MG tablet Take 1 tablet (1,000 mg total) by mouth 2 (two) times daily.    milrinone 20mg/100ml D5W, 200mcg/ml, (PRIMACOR) 20 mg/100 mL (200 mcg/mL) infusion Inject 34.875 mcg/min into the vein continuous.    mirtazapine (REMERON) 15 MG tablet Take 1 tablet (15 mg total) by mouth nightly.    pen needle, diabetic 32 gauge x 5/32" Ndle Use to inject insulin 5 (five) times daily.    valsartan (DIOVAN) 40 MG tablet Take 1 tablet (40 mg total) by mouth 2 (two) times daily.    warfarin (COUMADIN) 3 MG tablet Take 1.5 tablets (4.5mg) orally daily, except 2 tablets (6mg) on Wednesdays and Saturdays    [DISCONTINUED] digoxin (LANOXIN) 125 mcg tablet Take 1 tablet (0.125 mg total) by mouth once daily.    [DISCONTINUED] EScitalopram oxalate (LEXAPRO) 5 MG Tab Take 1 tablet (5 mg total) by mouth once daily.    [DISCONTINUED] insulin (LANTUS SOLOSTAR U-100 INSULIN) glargine 100 units/mL (3mL) SubQ pen Inject 10 Units into the skin every evening. (Patient not taking: Reported on 5/24/2022)    [DISCONTINUED] metOLazone (ZAROXOLYN) 2.5 MG tablet Take 2.5 mg by mouth every other day.    [DISCONTINUED] metoprolol succinate (TOPROL-XL) 25 MG 24 hr tablet TAKE 1 TABLET(25 MG) BY MOUTH EVERY DAY    [DISCONTINUED] pantoprazole (PROTONIX) 40 MG tablet Take 1 tablet (40 mg total) by mouth once daily.     Family History       Problem Relation (Age of Onset)    Diverticulosis Brother    Heart attack Maternal Grandmother, Maternal Grandfather    Heart failure Father          Tobacco Use    Smoking status: Former     Current packs/day: 0.00     Average packs/day: 0.5 packs/day for 16.6 years (8.3 ttl pk-yrs)     Types: Cigarettes     " Start date: 10/1/2004     Quit date: 2021     Years since quittin.3    Smokeless tobacco: Never   Substance and Sexual Activity    Alcohol use: Not Currently    Drug use: Not Currently     Types: Marijuana, MDMA (Ecstacy)    Sexual activity: Yes     Partners: Female     Birth control/protection: None     Review of Systems   Unable to perform ROS: Patient nonverbal     Objective:     Vital Signs (Most Recent):  Temp: 99.2 °F (37.3 °C) (23 0600)  Pulse: (!) 144 (23 0600)  Resp: 18 (23 06)  BP: (!) 153/102 (23 0438)  SpO2: 96 % (23 06) Vital Signs (24h Range):  Temp:  [97 °F (36.1 °C)-99.2 °F (37.3 °C)] 99.2 °F (37.3 °C)  Pulse:  [110-144] 144  Resp:  [16-22] 18  SpO2:  [96 %-100 %] 96 %  BP: (153)/(102) 153/102     Weight: 95.3 kg (210 lb 1.6 oz)  Body mass index is 26.26 kg/m².     Physical Exam  Vitals and nursing note reviewed.   HENT:      Head: Normocephalic.   Eyes:      General: No visual field deficit.     Pupils: Pupils are equal, round, and reactive to light.   Pulmonary:      Effort: Pulmonary effort is normal.   Musculoskeletal:      Right lower leg: No edema.      Left lower leg: No edema.   Skin:     General: Skin is warm and dry.      Capillary Refill: Capillary refill takes less than 2 seconds.   Neurological:      Cranial Nerves: No facial asymmetry.      Motor: Seizure activity present. No abnormal muscle tone.      Comments:     Very limited PE 2/2 seizure/aphasia/post-ictal  Appears post-ictal/active seizures  Rhythmic shaking of LUE, RUE  Gaze midline, PERR, appears to track examiner  Withdraws from painful stim BUE, BLE   Receptive and expressive aphasia, again unsure how accurate this is 2/2 active seizure activity          NEUROLOGICAL EXAMINATION:     CRANIAL NERVES     CN III, IV, VI   Pupils are equal, round, and reactive to light.      Significant Labs:   Recent Lab Results         23  0449        POC Anion Gap 17       POC BUN 7       POC  Chloride 96       POC Creatinine 1.2       POC Glucose 231       POC Hematocrit 43       POC HEMOGLOBIN 15       POC Ionized Calcium 0.99       Potassium, Blood Gas 3.4       Sodium, Blood Gas 140       POC TCO2 (MEASURED) 31       Sample VENOUS               Significant Imaging: I have reviewed all pertinent imaging results/findings within the past 24 hours.

## 2023-08-31 NOTE — ASSESSMENT & PLAN NOTE
Stroke risk factor  On AC with coumadin, reportedly had been out of meds x 5 days prior to presentation  Hold AC due to concern for petechial hemorrhage on CT, recommend repeat CTH tomorrow (9/1) and if stable without new/worsening hemorrhage then ok to resume AC

## 2023-08-31 NOTE — ASSESSMENT & PLAN NOTE
Reported episode of seizure like activity at [facility] with BUE rhythmic shaking  Per chart, currently on Keppra 1g BID at home for seizure ppx    --General neurology consulted for seizure/AED recs  --Recommend EEG to further evaluate for seizures

## 2023-08-31 NOTE — CONSULTS
"[facility] - Emergency Dept  Neurology  Consult Note    Patient Name: Kevan Queen  MRN: [id number]  Admission Date: 8/31/2023  Hospital Length of Stay: 0 days  Code Status: Prior   Attending Provider: Rodger Yadav IV, MD   Consulting Provider: Deysi Barton NP  Primary Care Physician: Rosio Armendariz FNP  Principal Problem:<principal problem not specified>    Inpatient consult to Vascular (Stroke) Neurology  Consult performed by: Deysi Barton NP  Consult ordered by: Almita Ruiz MD         Subjective:     Chief Complaint:  Aphasia, right sided weakness      HPI:   38 year old male with a past medical history of CHF, DM, drug abuse, medication noncompliance, cardiomyopathy s/p LVAD, and seizures presented to ED on 8/31 for aphasia and right sided weakness. Mother (at bedside) reports she saw him last night around 8:00 pm, he was normal. He had recently ran out of his Coumadin, mother reports his prescription was not refilled and canceled. He had been out of Coumadin for 5 days. She was able to get it filled and gave him a dose last night (gave him 2 pills she is unsure the dose). He lives with a significant other who reported they went to bed at 1:30 am. He was able to walk to bed, but was not answering questions appropriately. He got in bed and had some rhythmic shaking, she suspected he had a seizure. When the shaking began, she asked him if he wanted her to call 911, he said "no, hold on". She reported around 3:30 am, she asked him if he was ok, he did not respond to her but was trying to speak. She called 911. Upon arrival to ED, a stroke alert was activated. He was not a candidate for thrombolytic therapy 2/2 petechial hemorrhage on CT. CT head significant for hypodensity in the left posterior parietal lobe with mild surrounding mass effect with possible acute to subacute infarct and petechial hemorrhage, CT findings suggestive that infarct is older than 6 hours. CTA head " and neck negative for LVO. He will be transferred to [facility] 2/2 LVAD.              Past Medical History:   Diagnosis Date    Acute respiratory failure with hypoxia 7/22/2023    Arthritis     Awareness alteration, transient 9/1/2022    Cardiomyopathy     CHF (congestive heart failure) 10/01/2020    COVID-19 6/3/2023    Diabetes mellitus     Dilated cardiomyopathy 10/26/2020    Drug abuse 10/2020    Headache 4/19/2023    Hyperglycemia 12/16/2022    Hyperosmolar hyperglycemic state (HHS) 5/25/2022    ICD (implantable cardioverter-defibrillator) in place 10/26/2020    Left ventricular assist device (LVAD) complication, initial encounter 6/5/2023    Muscle cramping 6/15/2022    Renal disorder     SOB (shortness of breath) 6/13/2022    Tingling in extremities 7/13/2022       Past Surgical History:   Procedure Laterality Date    APPLICATION OF WOUND VACUUM-ASSISTED CLOSURE DEVICE N/A 6/30/2022    Procedure: APPLICATION, WOUND VAC;  Surgeon: Luis F Paige MD;  Location: [facility] OR [address] [place] [address];  Service: Cardiovascular;  Laterality: N/A;  50 x 5 cm    CARDIAC DEFIBRILLATOR PLACEMENT      IMPLANTATION OF RIGHT VENTRICULAR ASSIST DEVICE (RVAD) N/A 6/29/2022    Procedure: INSERTION, RVAD;  Surgeon: Luis F Paige MD;  [facility];  Service: Cardiovascular;  Laterality: N/A;    INSERTION OF GRAFT TO PERICARDIUM Right 6/30/2022    Procedure: INSERTION-RIGHT VENTRICULAR ASSIST DEVICE;  Surgeon: Luis F Paige MD;  [facility];  Service: Cardiovascular;  Laterality: Right;    IRRIGATION OF MEDIASTINUM  6/30/2022    Procedure: IRRIGATION, MEDIASTINUM;  Surgeon: Luis F Paige MD;  [facility];  Service: Cardiovascular;;    LEFT VENTRICULAR ASSIST DEVICE Left 6/23/2022    Procedure: INSERTION-LEFT VENTRICULAR ASSIST DEVICE;  Surgeon: Luis F Paige MD;  [facility];  Service: Cardiovascular;  Laterality: Left;    LEFT VENTRICULAR ASSIST DEVICE N/A  6/29/2022    Procedure: INSERTION-LEFT VENTRICULAR ASSIST DEVICE;  Surgeon: Luis F Paige MD;  [facility];  Service: Cardiovascular;  Laterality: N/A;    RIGHT HEART CATHETERIZATION Right 4/8/2022    Procedure: INSERTION, CATHETER, RIGHT HEART;  Surgeon: Luca Lopez Jr., MD;  Location: [facility] CATH LAB;  Service: Cardiology;  Laterality: Right;    RIGHT HEART CATHETERIZATION Right 4/19/2022    Procedure: INSERTION, CATHETER, RIGHT HEART;  Surgeon: Josh Pulido MD;  Location: [facility] CATH LAB;  Service: Cardiology;  Laterality: Right;    RIGHT HEART CATHETERIZATION Right 7/21/2022    Procedure: INSERTION, CATHETER, RIGHT HEART;  Surgeon: Dalia Crum MD;  Location: [facility] CATH LAB;  Service: Cardiology;  Laterality: Right;    RIGHT HEART CATHETERIZATION Right 10/31/2022    Procedure: INSERTION, CATHETER, RIGHT HEART;  Surgeon: Dalia Crum MD;  Location: [facility] CATH LAB;  Service: Cardiology;  Laterality: Right;    STERNAL WOUND CLOSURE N/A 6/30/2022    Procedure: CLOSURE, WOUND, STERNUM;  Surgeon: Luis F Paige MD;  [facility];  Service: Cardiovascular;  Laterality: N/A;       Review of patient's allergies indicates:   Allergen Reactions    Bumex [bumetanide] Hives    Torsemide Hives       Current Neurological Medications:     No current facility-administered medications on file prior to encounter.     Current Outpatient Medications on File Prior to Encounter   Medication Sig    aspirin (ECOTRIN) 81 MG EC tablet Take 1 tablet (81 mg total) by mouth once daily.    blood sugar diagnostic Strp Use to test blood glucose 4 (four) times daily.    blood-glucose meter Misc Use as instructed    busPIRone (BUSPAR) 10 MG tablet Take 10 mg by mouth 2 (two) times daily.    dextrose 5 % (D5W) SolP 500 mL with ceFAZolin 1 gram SolR 8 g per day IV continuously    furosemide (LASIX) 80 MG tablet Take 1 tablet (80 mg total) by mouth once daily.    insulin aspart U-100  "(NOVOLOG) 100 unit/mL (3 mL) InPn pen Inject 14 Units into the skin 3 (three) times daily with meals. Plus Sliding Scale: 151-200: +1, 201-250: +2, 251-300: +3, 301-350: +4 and call MD    insulin detemir U-100, Levemir, 100 unit/mL (3 mL) SubQ InPn pen Inject 24 Units into the skin 2 (two) times daily. (Patient not taking: Reported on 8/9/2023)    lancets 33 gauge Misc Use to test blood glucose 4 (four) times daily.    levETIRAcetam (KEPPRA) 1000 MG tablet Take 1 tablet (1,000 mg total) by mouth 2 (two) times daily.    milrinone 20mg/100ml D5W, 200mcg/ml, (PRIMACOR) 20 mg/100 mL (200 mcg/mL) infusion Inject 34.875 mcg/min into the vein continuous.    mirtazapine (REMERON) 15 MG tablet Take 1 tablet (15 mg total) by mouth nightly.    pen needle, diabetic 32 gauge x 5/32" Ndle Use to inject insulin 5 (five) times daily.    valsartan (DIOVAN) 40 MG tablet Take 1 tablet (40 mg total) by mouth 2 (two) times daily.    warfarin (COUMADIN) 3 MG tablet Take 1.5 tablets (4.5mg) orally daily, except 2 tablets (6mg) on Wednesdays and Saturdays    [DISCONTINUED] digoxin (LANOXIN) 125 mcg tablet Take 1 tablet (0.125 mg total) by mouth once daily.    [DISCONTINUED] EScitalopram oxalate (LEXAPRO) 5 MG Tab Take 1 tablet (5 mg total) by mouth once daily.    [DISCONTINUED] insulin (LANTUS SOLOSTAR U-100 INSULIN) glargine 100 units/mL (3mL) SubQ pen Inject 10 Units into the skin every evening. (Patient not taking: Reported on 5/24/2022)    [DISCONTINUED] metOLazone (ZAROXOLYN) 2.5 MG tablet Take 2.5 mg by mouth every other day.    [DISCONTINUED] metoprolol succinate (TOPROL-XL) 25 MG 24 hr tablet TAKE 1 TABLET(25 MG) BY MOUTH EVERY DAY    [DISCONTINUED] pantoprazole (PROTONIX) 40 MG tablet Take 1 tablet (40 mg total) by mouth once daily.     Family History       Problem Relation (Age of Onset)    Diverticulosis Brother    Heart attack Maternal Grandmother, Maternal Grandfather    Heart failure Father          Tobacco Use "    Smoking status: Former     Current packs/day: 0.00     Average packs/day: 0.5 packs/day for 16.6 years (8.3 ttl pk-yrs)     Types: Cigarettes     Start date: 10/1/2004     Quit date: 2021     Years since quittin.3    Smokeless tobacco: Never   Substance and Sexual Activity    Alcohol use: Not Currently    Drug use: Not Currently     Types: Marijuana, MDMA (Ecstacy)    Sexual activity: Yes     Partners: Female     Birth control/protection: None     Review of Systems   Unable to perform ROS: Patient nonverbal     Objective:     Vital Signs (Most Recent):  Temp: 99.2 °F (37.3 °C) (23 0600)  Pulse: (!) 144 (23 0600)  Resp: 18 (23 06)  BP: (!) 153/102 (23 0438)  SpO2: 96 % (23 06) Vital Signs (24h Range):  Temp:  [97 °F (36.1 °C)-99.2 °F (37.3 °C)] 99.2 °F (37.3 °C)  Pulse:  [110-144] 144  Resp:  [16-22] 18  SpO2:  [96 %-100 %] 96 %  BP: (153)/(102) 153/102     Weight: 95.3 kg (210 lb 1.6 oz)  Body mass index is 26.26 kg/m².     Physical Exam  Vitals and nursing note reviewed.   HENT:      Head: Normocephalic.   Eyes:      General: No visual field deficit.     Pupils: Pupils are equal, round, and reactive to light.   Pulmonary:      Effort: Pulmonary effort is normal.   Musculoskeletal:      Right lower leg: No edema.      Left lower leg: No edema.   Skin:     General: Skin is warm and dry.      Capillary Refill: Capillary refill takes less than 2 seconds.   Neurological:      Cranial Nerves: No facial asymmetry.      Motor: Seizure activity present. No abnormal muscle tone.      Comments:     Very limited PE 2/2 seizure/aphasia/post-ictal  Appears post-ictal/active seizures  Rhythmic shaking of LUE, RUE  Gaze midline, PERR, appears to track examiner  Withdraws from painful stim BUE, BLE   Receptive and expressive aphasia, again unsure how accurate this is 2/2 active seizure activity          NEUROLOGICAL EXAMINATION:     CRANIAL NERVES     CN III, IV, VI   Pupils are  equal, round, and reactive to light.      Significant Labs:   Recent Lab Results         08/31/23  0449        POC Anion Gap 17       POC BUN 7       POC Chloride 96       POC Creatinine 1.2       POC Glucose 231       POC Hematocrit 43       POC HEMOGLOBIN 15       POC Ionized Calcium 0.99       Potassium, Blood Gas 3.4       Sodium, Blood Gas 140       POC TCO2 (MEASURED) 31       Sample VENOUS               Significant Imaging: I have reviewed all pertinent imaging results/findings within the past 24 hours.    Assessment and Plan:     Stroke  Stroke    -Patient arrived at 4:38 am, stroke alert activated at 4:41 am  -Not a candidate for TNK 2/2 possible old infarct and petechial hemorrhage   -Not a candidate for mechanical thrombectomy   -planning to transfer to [facility] 2/2 LVAD  -currently being loaded with Keppra, recommend Ativan 1 mg PRN seizure activity, if no improvement would recommend additional 1 mg Ativan IV for seizure activity  -non-compliant with Coumadin recently     -CT head:   1. An ill-defined hypodensity is seen in the left posterior parietal lobe (series 2, images 42-50), new since the prior examination. There is mild surrounding mass effect with effacement of the convexity sulci. There are subtle bands of hyperdensity in the regional subcortical zones. Findings suggests an acute to subacute infarct with possible petechial hemorrage. The presence of some mass effect suggests an older than 6 hour event.  2. Details and other findings as noted above.  -CTA head and neck:   1. Unremarkable CT angiogram of the head and neck. Details and findings as noted above.        VTE Risk Mitigation (From admission, onward)    None          Thank you for your consult. Further recommendations to follow from MD Deysi Barton, NP  Neurology  [facility] - Emergency Dept

## 2023-08-31 NOTE — PT/OT/SLP PROGRESS
Speech Language Pathology      eKvan Queen  MRN: [id number]    Patient not seen today secondary to (Per RN, pt pending transition to ICU care. SLP will await new orders when pt is medically appropriate for participation in clinical swallow evaluation).   8/31/2023

## 2023-08-31 NOTE — PROGRESS NOTES
08/31/23 1016 08/31/23 1017   Vital Signs   BP (!) 122/95 (!) 108/0   MAP (mmHg) 106  --    BP Location Left arm Left arm   BP Method Automatic Doppler   Patient Position Lying Lying     Patient admitted to CSU. Patient arrived to floor from [place] [facility] after stroke. Patient AAO x1 at this time. Patient placed on tele. Vital signs obtained and notified Dr. Herndon and VAD coordinator, Marissa, Charge nurse notified and all at the bedside. Stroke code called by [facility] to expedite the transfer process to ICU and get neurology consulted due to issues with EPIC. Taken to CT scan and set for a transfer to SICU [id number]. Cardiac monitoring maintained throughout transfer.

## 2023-08-31 NOTE — PROCEDURES
Preliminary EEG Read    Background:   - Consists of intermixed beta activity resembling sleep spindles as well as underlying delta activity.  The left hemisphere has slightly more delta range slowing compared to the right suggestive of focal cortical dysfunction.        Impression:   - Sleep/sedation affect  - focal left hemispheric dysfunction consistent w/ acute/subacute L MCA stroke  - no epileptiform activity    Please hit the EEG button with any clinical activity concerning for seizures and describe what you see.    Detailed report to follow.     Asiya Sharp MD  [facility]   Department of Neurology

## 2023-08-31 NOTE — ASSESSMENT & PLAN NOTE
Stroke risk factor   on cessation when appropriate  Consider nicotine patch PRN and referral at discharge for smoking cessation

## 2023-08-31 NOTE — ASSESSMENT & PLAN NOTE
Stroke    -Patient arrived at 4:38 am, stroke alert activated at 4:41 am  -Not a candidate for TNK 2/2 possible old infarct and petechial hemorrhage   -Not a candidate for mechanical thrombectomy   -planning to transfer to [facility] 2/2 LVAD  -currently being loaded with Keppra, recommend Ativan 1 mg PRN seizure activity, if no improvement would recommend additional 1 mg Ativan IV for seizure activity  -non-compliant with Coumadin recently     -CT head:   1. An ill-defined hypodensity is seen in the left posterior parietal lobe (series 2, images 42-50), new since the prior examination. There is mild surrounding mass effect with effacement of the convexity sulci. There are subtle bands of hyperdensity in the regional subcortical zones. Findings suggests an acute to subacute infarct with possible petechial hemorrage. The presence of some mass effect suggests an older than 6 hour event.  2. Details and other findings as noted above.  -CTA head and neck:   1. Unremarkable CT angiogram of the head and neck. Details and findings as noted above.

## 2023-08-31 NOTE — CONSULTS
[facility] - Surgical Intensive Care  Vascular Neurology  Comprehensive Stroke Center  Consult Note    Consults  Assessment/Plan:     Patient is a 38 y.o. year old male with:    Embolic stroke involving left middle cerebral artery  Kevan Queen is a 38 y.o. male with pertinent medical history of DM, CHF, cardiomyopathy s/p LVAD and ICD, drug abuse, medication noncompliance, and seizures that was transferred from [facility] with acute/subacute L MCA stroke for higher level care. Initially presented to with aphasia and RSW, LKN approx 8 hrs PTA. Patient's significant other also reported episode of seizure-like activity, described as rhythmic shaking. Of note patient reportedly had been out of coumadin x 5 days, which was finally refilled 8/30 and was given 2 pills to take that night. In OSH ED, documented to have seizure-like activity with rhythmic shaking of BUE. CTH with L parietal hypodensity with subtle areas of hyperdensity concerning for acute/subacute infarct with petechial hemorrhage, CTA negative for LVO or critical stenosis. Determined not a candidate for acute interventions with thrombolytics/thrombectomy.    Stroke code was activated at [facility] due to AMS and for expedited transfer to ICU for concerns of airway protection. Stat CTA stable compared to prior study. He was transferred to SICU and intubated for airway protection. Exam this afternoon limited due to intubation/sedation, patient not moving RUE to pain with triple flexion to all other extremities but corneal/cough/gag intact.    Recommendations:    Antithrombotics for secondary stroke prevention: Antiplatelets: Aspirin: 81 mg daily ok to continue at this time; recommend repeat CTH tomorrow AM (9/1) to monitor for stability, if area of petechial hemorrhage stable then OK to start heparin gtt for AC     Statins for secondary stroke prevention and HLD, if present: Statins: Atorvastatin- 40 mg daily    Aggressive risk factor modification: DM, Diet,  Exercise, Obesity     Rehab efforts: The patient has been evaluated by a stroke team provider and the therapy needs have been fully considered based off the presenting complaints and exam findings. The following therapy evaluations are needed: PT evaluate and treat, OT evaluate and treat, SLP evaluate and treat pending extubated    Diagnostics ordered/pending: CT scan of head without contrast to asses brain parenchyma, HgbA1C to assess blood glucose levels, Lipid Profile to assess cholesterol levels, TTE to assess cardiac function/status     VTE prophylaxis: Mechanical prophylaxis: Place SCDs  None: Reason for No Pharmacological VTE Prophylaxis: concern for petechial hemorrahge on CTH    BP parameters: Infarct: No intervention, SBP <220        History of substance abuse  Stroke risk factor  Recommend obtaining UTox    on cessation when appropriate    Seizure-like activity  Reported episode of seizure like activity at OSH with BUE rhythmic shaking  Per chart, currently on Keppra 1g BID at home for seizure ppx    --General neurology consulted for seizure/AED recs  --Recommend EEG to further evaluate for seizures    LVAD (left ventricular assist device) present  Stroke risk factor  On AC with coumadin, reportedly had been out of meds x 5 days prior to presentation  Hold AC due to concern for petechial hemorrhage on CT, recommend repeat CTH tomorrow (9/1) and if stable without new/worsening hemorrhage then ok to resume AC    Type 2 diabetes mellitus with hyperglycemia  Stroke risk factor  A1c pending  BG goal while inpatient 140-180  Insulin per primary team, currently on levemir 10 units BID + SSI PRN    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  Stroke risk factor   on cessation when appropriate  Consider nicotine patch PRN and referral at discharge for smoking cessation    Chronic combined systolic and diastolic heart failure  Stroke risk factor  S/p LVAD and ICD  Management per primary  team/[facility]        STROKE DOCUMENTATION     Acute Stroke Times   Last Known Normal Date: 08/31/23  Last Known Normal Time: 1050  Symptom Onset Date: 08/31/23  Symptom Onset Time: 1100  Stroke Team Called Date: 08/31/23  Stroke Team Called Time: 1113  CT Interpretation Time: 1145  Thrombolytic Therapy Recommended: No    NIH Scale:  1a. Level of Consciousness: 2-->Not alert, requires repeated stimulation to attend, or is obtunded and requires strong or painful stimulation to make movements (not stereotyped)  1b. LOC Questions: 2-->Answers neither question correctly  1c. LOC Commands: 2-->Performs neither task correctly  2. Best Gaze: 0-->Normal  3. Visual: 0-->No visual loss  4. Facial Palsy: 0-->Normal symmetrical movements  5a. Motor Arm, Left: 3-->No effort against gravity, limb falls  5b. Motor Arm, Right: 4-->No movement  6a. Motor Leg, Left: 3-->No effort against gravity, leg falls to bed immediately  6b. Motor Leg, Right: 3-->No effort against gravity, leg falls to bed immediately  7. Limb Ataxia: 0-->Absent  8. Sensory: 1-->Mild-to-moderate sensory loss, patient feels pinprick is less sharp or is dull on the affected side, or there is a loss of superficial pain with pinprick, but patient is aware of being touched  9. Best Language: 3-->Mute, global aphasia, no usable speech or auditory comprehension  10. Dysarthria: (UN) Intubated or other physical barrier  11. Extinction and Inattention (formerly Neglect): 0-->No abnormality  Total (NIH Stroke Scale): 23    Modified Huma    Candie Coma Scale:    ABCD2 Score:    [id number] Score:   HAS -BLED Score:   ICH Score:   Hunt & Arce Classification:       Thrombolysis Candidate? No, Out of window - Symptom onset > 4.5 hours, CT findings (ICH, SAH, Large core infarct)     Delays to Thrombolysis?  Not Applicable    Interventional Revascularization Candidate?   Is the patient eligible for mechanical endovascular reperfusion (SIERRA)?  No; No large vessel occlusion  identified on imaging     Delays to Thrombectomy? Not Applicable    Hemorrhagic change of an Ischemic Stroke: Does this patient have an ischemic stroke with hemorrhagic changes? Yes, Grading Scale: HI Type 1 (HI-1) = small petechiae along the margins of the infarct. Is this a symptomatic change?  No - Hemorrhage is not clinically significant     Subjective:     History of Present Illness:  Kevan Queen is a 38 y.o. male with PMH of DM, CHF, cardiomyopathy s/p LVAD and ICD, drug abuse, medication noncompliance, and seizures that was transferred from [facility] for higher level care of acute/subacute L MCA stroke. History obtained from chart review, patient currently intubated and no family present at bedside. He initially presented to OSH ED with aphasia, RSW, and reported episode of seizure like activity. LKN approx. 8 hrs prior at 8pm 8/30. Patient's significant other stated he was able to talk to bed at 1:30am but was not answering questions appropriately, after getting in bed had episode of rhythmic shaking. Of note patient's mother reported he had recently been out of coumadin for 5 days, she was finally able to get it refilled on 8/30 and gave him 2 pills to take that night. On exam at OSH ED, appeared to have seizure-like activity with rhythmic shaking of BUE. CTH at OSH with area of hypodensity in L posterior parietal lobe with subtle bands of hyperdensity suspicious for acute/subacute infarct with petechial hemorrhage. CTA stroke multiphase negative for LVO. Determined not a candidate for acute interventions, no TNK due to petechial hemorrhage on CTH and no LVO for thrombectomy. Stroke code activated at [facility] due to AMS and for expedited transfer to ICU for concerns of airway protection. Stat CTA stable compared to prior study and again showed L parietal hypodensity with questionable mild adjacent petechial hemorrhage without LVO/critical stenosis. Following CTA he was transferred to SICU and intubated for  airway protection.          Past Medical History:   Diagnosis Date    Acute respiratory failure with hypoxia 7/22/2023    Arthritis     Awareness alteration, transient 9/1/2022    Cardiomyopathy     CHF (congestive heart failure) 10/01/2020    COVID-19 6/3/2023    Diabetes mellitus     Dilated cardiomyopathy 10/26/2020    Drug abuse 10/2020    Headache 4/19/2023    Hyperglycemia 12/16/2022    Hyperosmolar hyperglycemic state (HHS) 5/25/2022    ICD (implantable cardioverter-defibrillator) in place 10/26/2020    Left ventricular assist device (LVAD) complication, initial encounter 6/5/2023    Muscle cramping 6/15/2022    Renal disorder     SOB (shortness of breath) 6/13/2022    Tingling in extremities 7/13/2022     Past Surgical History:   Procedure Laterality Date    APPLICATION OF WOUND VACUUM-ASSISTED CLOSURE DEVICE N/A 6/30/2022    Procedure: APPLICATION, WOUND VAC;  Surgeon: Luis F Paige MD;  [facility];  Service: Cardiovascular;  Laterality: N/A;  50 x 5 cm    CARDIAC DEFIBRILLATOR PLACEMENT      IMPLANTATION OF RIGHT VENTRICULAR ASSIST DEVICE (RVAD) N/A 6/29/2022    Procedure: INSERTION, RVAD;  Surgeon: Luis F Paige MD;  Location: [facility] OR 2ND FLR;  Service: Cardiovascular;  Laterality: N/A;    INSERTION OF GRAFT TO PERICARDIUM Right 6/30/2022    Procedure: INSERTION-RIGHT VENTRICULAR ASSIST DEVICE;  Surgeon: Luis F Paige MD;  Location: [facility] OR 2ND FLR;  Service: Cardiovascular;  Laterality: Right;    IRRIGATION OF MEDIASTINUM  6/30/2022    Procedure: IRRIGATION, MEDIASTINUM;  Surgeon: Luis F Paige MD;  Location: [facility] OR 2ND FLR;  Service: Cardiovascular;;    LEFT VENTRICULAR ASSIST DEVICE Left 6/23/2022    Procedure: INSERTION-LEFT VENTRICULAR ASSIST DEVICE;  Surgeon: Luis F Paige MD;  Location: [facility] OR 2ND FLR;  Service: Cardiovascular;  Laterality: Left;    LEFT VENTRICULAR ASSIST DEVICE N/A 6/29/2022    Procedure: INSERTION-LEFT VENTRICULAR ASSIST DEVICE;   Surgeon: Luis F Paige MD;  [facility];  Service: Cardiovascular;  Laterality: N/A;    RIGHT HEART CATHETERIZATION Right 2022    Procedure: INSERTION, CATHETER, RIGHT HEART;  Surgeon: Luca Lopez Jr., MD;  Location: [facility] CATH LAB;  Service: Cardiology;  Laterality: Right;    RIGHT HEART CATHETERIZATION Right 2022    Procedure: INSERTION, CATHETER, RIGHT HEART;  Surgeon: Josh Pulido MD;  Location: [facility] CATH LAB;  Service: Cardiology;  Laterality: Right;    RIGHT HEART CATHETERIZATION Right 2022    Procedure: INSERTION, CATHETER, RIGHT HEART;  Surgeon: Dalia Crum MD;  Location: [facility] CATH LAB;  Service: Cardiology;  Laterality: Right;    RIGHT HEART CATHETERIZATION Right 10/31/2022    Procedure: INSERTION, CATHETER, RIGHT HEART;  Surgeon: Dalia Crum MD;  Location: [facility] CATH LAB;  Service: Cardiology;  Laterality: Right;    STERNAL WOUND CLOSURE N/A 2022    Procedure: CLOSURE, WOUND, STERNUM;  Surgeon: Luis F Paige MD;  [address]  Service: Cardiovascular;  Laterality: N/A;     Family History   Problem Relation Age of Onset    Heart failure Father     Diverticulosis Brother     Heart attack Maternal Grandmother     Heart attack Maternal Grandfather      Social History     Tobacco Use    Smoking status: Former     Current packs/day: 0.00     Average packs/day: 0.5 packs/day for 16.6 years (8.3 ttl pk-yrs)     Types: Cigarettes     Start date: 10/1/2004     Quit date: 2021     Years since quittin.3    Smokeless tobacco: Never   Substance Use Topics    Alcohol use: Not Currently    Drug use: Not Currently     Types: Marijuana, MDMA (Ecstacy)     Review of patient's allergies indicates:   Allergen Reactions    Bumex [bumetanide] Hives    Torsemide Hives       Medications: I have reviewed the current medication administration record.    Medications Prior to Admission   Medication Sig Dispense Refill Last Dose    aspirin  "(ECOTRIN) 81 MG EC tablet Take 1 tablet (81 mg total) by mouth once daily. 90 tablet 3     blood sugar diagnostic Strp Use to test blood glucose 4 (four) times daily. 200 each 5     blood-glucose meter Misc Use as instructed 1 each 0     busPIRone (BUSPAR) 10 MG tablet Take 10 mg by mouth 2 (two) times daily.       dextrose 5 % (D5W) SolP 500 mL with ceFAZolin 1 gram SolR 8 g per day IV continuously       furosemide (LASIX) 80 MG tablet Take 1 tablet (80 mg total) by mouth once daily. 30 tablet 11     insulin aspart U-100 (NOVOLOG) 100 unit/mL (3 mL) InPn pen Inject 14 Units into the skin 3 (three) times daily with meals. Plus Sliding Scale: 151-200: +1, 201-250: +2, 251-300: +3, 301-350: +4 and call MD 18 mL 5     insulin detemir U-100, Levemir, 100 unit/mL (3 mL) SubQ InPn pen Inject 24 Units into the skin 2 (two) times daily. (Patient not taking: Reported on 8/9/2023) 15 mL 5     lancets 33 gauge Misc Use to test blood glucose 4 (four) times daily. 200 each 3     levETIRAcetam (KEPPRA) 1000 MG tablet Take 1 tablet (1,000 mg total) by mouth 2 (two) times daily. 60 tablet 11     milrinone 20mg/100ml D5W, 200mcg/ml, (PRIMACOR) 20 mg/100 mL (200 mcg/mL) infusion Inject 34.875 mcg/min into the vein continuous.       mirtazapine (REMERON) 15 MG tablet Take 1 tablet (15 mg total) by mouth nightly. 30 tablet 11     pen needle, diabetic 32 gauge x 5/32" Ndle Use to inject insulin 5 (five) times daily. 200 each 5     valsartan (DIOVAN) 40 MG tablet Take 1 tablet (40 mg total) by mouth 2 (two) times daily. 180 tablet 3     warfarin (COUMADIN) 3 MG tablet Take 1.5 tablets (4.5mg) orally daily, except 2 tablets (6mg) on Wednesdays and Saturdays 60 tablet 5        Review of Systems   Unable to perform ROS: Intubated     Objective:     Vital Signs (Most Recent):  Temp: 99.7 °F (37.6 °C) (08/31/23 1149)  Pulse: (!) 151 (08/31/23 1425)  Resp: 18 (08/31/23 1425)  BP: (!) 117/92 (08/31/23 1200)  SpO2: 100 % (08/31/23 " 1425)    Vital Signs Range (Last 24H):  Temp:  [97 °F (36.1 °C)-99.7 °F (37.6 °C)]   Pulse:  [108-214]   Resp:  [15-39]   BP: (102-153)/(0-102)   SpO2:  [91 %-100 %]   Arterial Line BP: ()/(70-93)        Physical Exam  Vitals and nursing note reviewed.   Constitutional:       Appearance: He is ill-appearing.      Interventions: He is intubated.   HENT:      Head: Normocephalic.      Nose: Nose normal.   Eyes:      Extraocular Movements: Extraocular movements intact.      Conjunctiva/sclera: Conjunctivae normal.   Cardiovascular:      Rate and Rhythm: Normal rate.   Pulmonary:      Effort: He is intubated.   Abdominal:      General: There is no distension.   Musculoskeletal:         General: No deformity.   Skin:     General: Skin is warm.   Neurological:      Cranial Nerves: No dysarthria or facial asymmetry.      Sensory: Sensory deficit present.      Motor: Weakness present.      Comments: Exam limited due to intubation/sedation. Does not follow commands. Grimaces to pain in RUE but no movement, triple flexion to pain in LUE and BLE. + corneals/cough/gag              Neurological Exam:   LOC: intubated/sedated  Attention Span: poor  Language: Intubated  Articulation: Untestable due to intubation  Orientation: Untestable due to intubation  Pupils (CN II, III): PERRL  Gag Reflex: present  Motor: Arm left  Paresis: 1/5  Leg left  Paresis: 1/5  Arm right  Plegia 0/5  Leg right Paresis: 1/5  Sensation: Paul-hypoesthesia right      Laboratory:  CMP:   Recent Labs   Lab 08/31/23  0536 08/31/23  1046 08/31/23  1250   GLUCOSE 227*  --   --    CALCIUM 8.3*   < > 8.2*   ALBUMIN 3.1*   < > 2.8*   PROT  --    < > 7.7      < > 137   K 4.0   < > 4.0   CO2 29   < > 26   CL  --    < > 100   BUN 8.1*   < > 8   CREATININE 1.37*   < > 1.1   ALKPHOS 114   < > 95   ALT <5   < > <5*   AST 29   < > 21   BILITOT 0.8   < > 0.7    < > = values in this interval not displayed.     CBC:   Recent Labs   Lab 08/31/23  1046   WBC  "12.54   RBC 3.56*   HGB 10.1*   HCT 31.7*      MCV 89   MCH 28.4   MCHC 31.9*     Lipid Panel:   Recent Labs   Lab 08/31/23  0536   CHOL 147   HDL 42   TRIG 120     Coagulation:   Recent Labs   Lab 08/31/23  1046   INR 1.3*     Hgb A1C: No results for input(s): "HGBA1C" in the last 168 hours.    TSH:   Recent Labs   Lab 08/31/23  0536   TSH 4.954*       Diagnostic Results:    Brain/Vessel imaging:  CTA stroke multiphase 8/31/2023 1145  Impression:   Left parietal edema, nonspecific.  It is unclear whether this represents a recent infarct, focus of edema from demyelination or encephalitis, or other underlying lesion.  No midline shift or herniation.  Questionable very mild adjacent petechial hemorrhage with no focal hematoma.   No significant stenosis at the carotid bifurcations by NASCET criteria the vertebral arteries are patent.  No evidence of dissection.   No major branch stenosis/occlusion at the [id number].  No evidence of venous thrombosis.    CTA stroke multiphase 8/31/2023 0504 @ OSH   Impression:  No large vessel occlusion or flow-limiting stenosis.  No significant change from the [facility] interpretation.    CTH without contrast 8/31/2023 0453 @ OSH  Impression:  Findings suspicious for acute to subacute left parietal infarct with possible petechial hemorrhage.  No significant discrepancy with the preliminary report.      Cardiac Evaluation:   TTE 7/31/2023    LVAD: There is a Heartmate III LVAD running at 5100 RPM. The aortic valve does not open. The ventricular septum is at midline.    Left Ventricle: The left ventricle is severely dilated. Normal wall thickness. Severe global hypokinesis present. There is severely reduced systolic function with a visually estimated ejection fraction of 10 -15%. There is diastolic dysfunction.    Right Ventricle: Right ventricle was not well visualized due to poor acoustic window.    Biatrial enlargement.    Tricuspid Valve: There is moderate " transvalvular regurgitation.    Estimated PASP is 33 mmHg.    IVC/SVC: Normal venous pressure at 3 mmHg.        DEYA Blair  [facility]  Department of Vascular Neurology   [facility] - Surgical Intensive Care

## 2023-08-31 NOTE — PROGRESS NOTES
Notified by floor RN that patient had arrived for admission for [facility]. Attending provider's John and Jerald notified of arrival. Coordinator went to patient's bedside. Upon arrival RNs Wilder and Agusto admitting patient. Patient not responding to coordinator verbally just moaning with eyes open. Asked patient if he knew his name, where he was or what was going on, patient did not answer and just stared off into distance. Patient unable to follow finger with gaze and patient unable to hold either extremities up nor squeeze fingers. Called NUSRAT Larson fellow and told him of concerning findings and fellow came to the bedside to assess patient. Fellow spoke with MD Lopez and decision was made to upgrade patient to ICU for concerns of protecting airway. BP elevated with MAPs in the 100s, patient pulsatile, decision to leave pressures as they are d/t confirmed stroke from outside facility. Both Epic system and phone systems were down at this point making placing urgent/STAT orders difficult. Decision made to activate a STROKE Code for the safety of the patient. Patient taken to CT by VAD Coordinator, Tylor SILVERIO, Neurology resident, Agusto and Marissa Charge Nurse. [place] RIAN Naranjo also arrived to patient's side and assisted in getting patient to scan and ICU. Patient began spitting up during transport, once scan completed, patient was taken to SICU and was promptly intubated and sedated for airway protection. A-Line also placed for BP management. Propofol gtt started, cardene is on standby if pressures increase. BP goal to keep MAPs around 90.

## 2023-08-31 NOTE — PLAN OF CARE
Recommendations     1.) If and when medically feasible, and alternative nutrition is medically warranted, recommend initiating TF of [employer] Peptide 1.5 at trickle rate and slowly advance to goal rate of 55ml/hr to provide 2030kcal, 98gPRO, and 924ml FF.      -note: this formula is vegan and pt follows a vegan lifestyle.      - if pt is on propofol (not vegan), recommend goal rate of TF [employer] 1.5@45ml/hr to provide 1661 kcal, 80gPRO, and 756ml FF + Kcal from Propofol.      - monitor for s/s of intolerance such as residuals >500ml, n/v/d, or abdominal distension.       2.) If and when medically able, recommend to ADAT, with goal of vegan diet.      3.) RD to monitor wt, nutritional status, skin, labs.        Goals: to meet % of EEN/EPN by next RD f/u  Nutrition Goal Status: new  Communication of RD Recs: other (comment) (POC

## 2023-08-31 NOTE — HPI
"38 year old male with a past medical history of CHF, DM, drug abuse, medication noncompliance, cardiomyopathy s/p LVAD, and seizures presented to ED on 8/31 for aphasia and right sided weakness. Mother (at bedside) reports she saw him last night around 8:00 pm, he was normal. He had recently ran out of his Coumadin, mother reports his prescription was not refilled and canceled. He had been out of Coumadin for 5 days. She was able to get it filled and gave him a dose last night (gave him 2 pills she is unsure the dose). He lives with a significant other who reported they went to bed at 1:30 am. He was able to walk to bed, but was not answering questions appropriately. He got in bed and had some rhythmic shaking, she suspected he had a seizure. When the shaking began, she asked him if he wanted her to call 911, he said "no, hold on". She reported around 3:30 am, she asked him if he was ok, he did not respond to her but was trying to speak. She called 911. Upon arrival to ED, a stroke alert was activated. He was not a candidate for thrombolytic therapy 2/2 petechial hemorrhage on CT. CT head significant for hypodensity in the left posterior parietal lobe with mild surrounding mass effect with possible acute to subacute infarct and petechial hemorrhage, CT findings suggestive that infarct is older than 6 hours. CTA head and neck negative for LVO. He will be transferred to [facility] 2/2 LVAD.         "

## 2023-08-31 NOTE — PROGRESS NOTES
Admit Note     Pt admitted and emergently intubated and sedated. SW went to pt's room, however no family were present.    The information below was gathered on 7/23/23 by Parveen who spoke with pt's SO, Niharika.     TREY following pt and will see pt and family during admission.    Patient is a 38 y.o. single male, admitted for CVA (cerebral vascular accident) [I63.9] per medical record. Pt received his LVAD on 6/23/22. The pt's significant other does the dressing changes.      Patient admitted to [facility] on 8/31/2023.  At this time, pt is intubated and sedated and not family/caregivers are present.    Household/Family Systems     Patient resides with significant other, and three children ages 3,5, and 17 (recent graduate) at    [address] [place] [address].    2 hours from [facility]    Pt cell (given at last admission) [phone]    Additional contact:   Niharika Wright (significant other, will start work soon and drives) [phone]    Support system includes significant other, mother and children. The pt has seven children, two with the pt's significant other. The pt's other children live with their mothers. Pt reports family/mother care for children in the home when the pt and significant other are not in attendance.      Patients primary caregiver is self with support from significant other when indicated.    At this time, it is unknown when pt will have visitors.     Confirmed patient and patients caregivers do have access to reliable transportation. Pt and significant other both drive.    Cognitive Status/Learning     Patient reports reading ability as college and states patient does not have difficulty with reading, writing, hearing, comprehension, learning, and memory.  Pt reports he continues to have the same difficulty with his eyesight.  Pt reports his eyesight issues do not effect his ability to drive. Patient reported in the past that he learns best by multiple methods.   Needed:  No.   Highest education level: Attended College/Technical School    Vocation/Disability   .  Working for Income: No  If no, reason not working: medical   Patient used to work as a CNA.  Significant other is currently is working at [employer].   Pt's mother receives monthly income and is able to assist pt financially.   Pt reports he has applied for disability and and a  is assisting with this process. Pt reports The Disability Office reports it may take an additional six months to process pt's information.   Pt reports no difficulty at this time covering the cost of monthly bills.     Adherence     Patient reports a high level of adherence to patients health care regimen. Pt reports he is a vegan.   Adherence counseling and education provided. Patient verbalizes understanding.    Substance Use    Patient caregiver confirmed the following substance usage.    Tobacco: none, patient denies any use.  Alcohol: none, patient denies any use.  Illicit Drugs/Non-prescribed Medications: no current use.   Pt does have a history of drug use. Pt stopped using Marijuana on 11/2021, cocaine on 7/2021 and ecstasy on 9/2020.    Patient states clear understanding of the potential impact of substance use.  Substance abstinence/cessation counseling, education and resources provided and reviewed.     Services Utilizing/ADLS    Infusion Service: Prior to admission, patient utilizing? yes [facility] [email or web address] for home Milrinone.  Pt's significant other Page does the IV dressing changes.   Pt would like to use the same IV company when discharged. [phone], fax [phone]    Home Health: Prior to admission, patient utilizing? no    DME: Prior to admission, no    Pulmonary/Cardiac Rehab: Prior to admission, no    Dialysis:  Prior to admission, no    Transplant Specialty Pharmacy:  Prior to admission, no.    Prior to admission, patient's caregiver reports patient was independent with ADLS and was driving.  Patient reports  patient is not able to care for self at this time due to compromised medical condition (as documented in medical record) and physical weakness..  Patient indicates a willingness to care for self once medically cleared to do so.    Insurance/Medications    Insured by   Payer/Plan Subscr  Sex Relation Sub. Ins. ID Effective Group Num   1. MEDICAID - HE* JOSE J DAMICO 1985 Male Self [id number]* 3/1/20 [id number]                                   [address] [address]      Primary Insurance (for UNOS reporting): Public Insurance - Medicaid  Secondary Insurance (for UNOS reporting): None    Patient caregiver reports patient is able to obtain and afford medications at this time and at time of discharge. Pt reports his monthly medications are running about $8.00    Living Will/Healthcare Power of     Patient's caregiver confirmed patient has a LW and/or HCPA.  Pt reports his mother is his HCPA.  Worker explained need for phone number.    provided education regarding LW and HCPA and the completion of forms.    Coping/Mental Health    Per Pt's Caregiver Page, Patient is coping adequately with the aid of  family members.     Patient indicated mental ailyn difficulties in the past.   Pt reports a history of depression, anxiety and anger and is taking Buspar. Pt reports he continues to be followed by Palliative Care in [place].  Pt is not participating in out pt counseling.  Pt reports no needs or concerns at this time and hopes to be able to go home soon.        Discharge Planning    At time of discharge, patient plans to return to patient's home under the care of self, significant other and mother.  Patients significant other will transport patient.  Per rounds today, expected discharge date has not been medically determined at this time. Patient and patients caregiver  verbalize understanding and are involved in treatment planning and discharge process.    Additional Concerns    Patient is being  followed for needs, education, resources, information, emotional support, supportive counseling, and for supportive and skilled discharge plan of care.  providing ongoing psychosocial support, education, resources and d/c planning as needed.  SW remains available.

## 2023-08-31 NOTE — H&P
[facility]  Heart Transplant  H&P    Patient Name: Kevan Queen  MRN: [id number]  Admission Date: 8/31/2023  Attending Physician: Luca Lopez Jr.*  Primary Care Provider: Rosio Armendariz FNP  Principal Problem:Embolic stroke involving left middle cerebral artery    Subjective:     History of Present Illness:  Mr. 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 He was reportedly not taking his coumadin or keppra for the past week due to running out. As per chart review, patient started to experience symptoms around 1am when he went to sleep. Symptoms worsened and EMS was called around 3:30am. Upon admission patient underwent a CT head showed an acute to subacute infarct with possible petechial hemorrhage. Labs were significant for INR of 1.2. Patient was loaded with Keppra and [place] [facility] [place].      Upon arrival, patient was found to be hemodynamically stable w/ MAPs of 106. However, he was noted to be not alert or oriented and extremely lethargic. Patient was noted to have GCS 10-11 with minimally reactive pupils. Patient underwent stat CT brain which showed no acute changes since prior CT, and transferred to the ICU for closer monitoring. Once in the ICU patient was intubated for airway protection.      Of note, patient was most recently hospitalized from 7/22/2023-8/8/2023 for sepsis/covod. Found to have loculated pleural effussion requiring chest tube placement and three doses of lytics. Effussion improved and patient was discharged with 6 week course of Ancef (to end on 9/14).        Home Medications:    Aspirin 81mg daily   Buspirone 10mg bid   Furosemide 80mg daily   Insulin detemir 25  units bid   Insulin aspart 14 units w/ meals   Milrinone 0.25   Mirtazapine 15mg nightly   Valsartan 40mg bi   Warfarin      Cardiac Imaging:    Echo (7/31/2023): HM3 at 5100. AV does not open. IV septum midline. EF 10-15%. LV severely dilated. Normal wall thickness. Severe global hypokinesis. RV not well visualized due to poor acoustic window. Biatrial enlargment. Mod TR.        Past Medical History:   Diagnosis Date    Acute respiratory failure with hypoxia 7/22/2023    Arthritis     Awareness alteration, transient 9/1/2022    Cardiomyopathy     CHF (congestive heart failure) 10/01/2020    COVID-19 6/3/2023    Diabetes mellitus     Dilated cardiomyopathy 10/26/2020    Drug abuse 10/2020    Headache 4/19/2023    Hyperglycemia 12/16/2022    Hyperosmolar hyperglycemic state (HHS) 5/25/2022    ICD (implantable cardioverter-defibrillator) in place 10/26/2020    Left ventricular assist device (LVAD) complication, initial encounter 6/5/2023    Muscle cramping 6/15/2022    Renal disorder     SOB (shortness of breath) 6/13/2022    Tingling in extremities 7/13/2022       Past Surgical History:   Procedure Laterality Date    APPLICATION OF WOUND VACUUM-ASSISTED CLOSURE DEVICE N/A 6/30/2022    Procedure: APPLICATION, WOUND VAC;  Surgeon: Luis F Paige MD;  Location: [facility] OR [address] [place] [address];  Service: Cardiovascular;  Laterality: N/A;  50 x 5 cm    CARDIAC DEFIBRILLATOR PLACEMENT      IMPLANTATION OF RIGHT VENTRICULAR ASSIST DEVICE (RVAD) N/A 6/29/2022    Procedure: INSERTION, RVAD;  Surgeon: Luis F Paige MD;  Location: [facility] OR [address] [place] [address];  Service: Cardiovascular;  Laterality: N/A;    INSERTION OF GRAFT TO PERICARDIUM Right 6/30/2022    Procedure: INSERTION-RIGHT VENTRICULAR ASSIST DEVICE;  Surgeon: Luis F Paige MD;  Location: [facility] OR [address] [place] [address];  Service: Cardiovascular;  Laterality: Right;    IRRIGATION OF MEDIASTINUM  6/30/2022    Procedure: IRRIGATION, MEDIASTINUM;  Surgeon: Luis F Paige MD;  Location:  [facility] OR 2ND FLR;  Service: Cardiovascular;;    LEFT VENTRICULAR ASSIST DEVICE Left 6/23/2022    Procedure: INSERTION-LEFT VENTRICULAR ASSIST DEVICE;  Surgeon: Luis F Paige MD;  Location: [facility] OR [facility] FLR;  Service: Cardiovascular;  Laterality: Left;    LEFT VENTRICULAR ASSIST DEVICE N/A 6/29/2022    Procedure: INSERTION-LEFT VENTRICULAR ASSIST DEVICE;  Surgeon: Luis F Paige MD;  [facility];  Service: Cardiovascular;  Laterality: N/A;    RIGHT HEART CATHETERIZATION Right 4/8/2022    Procedure: INSERTION, CATHETER, RIGHT HEART;  Surgeon: Luca Lopez Jr., MD;  Location: [facility] CATH LAB;  Service: Cardiology;  Laterality: Right;    RIGHT HEART CATHETERIZATION Right 4/19/2022    Procedure: INSERTION, CATHETER, RIGHT HEART;  Surgeon: Josh Pulido MD;  Location: [facility] CATH LAB;  Service: Cardiology;  Laterality: Right;    RIGHT HEART CATHETERIZATION Right 7/21/2022    Procedure: INSERTION, CATHETER, RIGHT HEART;  Surgeon: Dalia Crum MD;  Location: [facility] CATH LAB;  Service: Cardiology;  Laterality: Right;    RIGHT HEART CATHETERIZATION Right 10/31/2022    Procedure: INSERTION, CATHETER, RIGHT HEART;  Surgeon: Dalia Crum MD;  Location: [facility] CATH LAB;  Service: Cardiology;  Laterality: Right;    STERNAL WOUND CLOSURE N/A 6/30/2022    Procedure: CLOSURE, WOUND, STERNUM;  Surgeon: Luis F Paige MD;  [address];  Service: Cardiovascular;  Laterality: N/A;       Review of patient's allergies indicates:   Allergen Reactions    Bumex [bumetanide] Hives    Torsemide Hives       Current Facility-Administered Medications   Medication    aspirin chewable tablet 81 mg    atorvastatin tablet 40 mg    ceFAZolin (ANCEF) 8 g in dextrose 5 % (D5W) 500 mL CONTINUOUS INFUSION    dextrose 10 % infusion    dextrose 10% bolus 125 mL 125 mL    dextrose 10% bolus 250 mL 250 mL    fentaNYL 2500 mcg in 0.9% sodium chloride 250 mL infusion premix (titrating)    furosemide  injection 80 mg    glucagon (human recombinant) injection 1 mg    insulin aspart U-100 pen 0-5 Units    insulin detemir U-100 (Levemir) pen 10 Units    milrinone 20mg in D5W 100 mL infusion    mupirocin 2 % ointment    niCARdipine 40 mg/200 mL (0.2 mg/mL) infusion    phenylephrine HCl in 0.9% NaCl 1 mg/10 mL (100 mcg/mL) syringe    potassium chloride SA CR tablet 40 mEq    propofol (DIPRIVAN) 10 mg/mL infusion    propofoL (DIPRIVAN) 10 mg/mL infusion    sodium chloride 0.9% flush 10 mL     Facility-Administered Medications Ordered in Other Encounters   Medication    etomidate injection    fentaNYL 50 mcg/mL injection    succinylcholine injection     Family History       Problem Relation (Age of Onset)    Diverticulosis Brother    Heart attack Maternal Grandmother, Maternal Grandfather    Heart failure Father          Tobacco Use    Smoking status: Former     Current packs/day: 0.00     Average packs/day: 0.5 packs/day for 16.6 years (8.3 ttl pk-yrs)     Types: Cigarettes     Start date: 10/1/2004     Quit date: 2021     Years since quittin.3    Smokeless tobacco: Never   Substance and Sexual Activity    Alcohol use: Not Currently    Drug use: Not Currently     Types: Marijuana, MDMA (Ecstacy)    Sexual activity: Yes     Partners: Female     Birth control/protection: None     Review of Systems Unable to be obtained due to medical condition.   Objective:     Vital Signs (Most Recent):  Temp: 99.7 °F (37.6 °C) (23 1149)  Pulse: (!) 151 (23 1425)  Resp: 18 (23 1425)  BP: (!) 117/92 (23 1200)  SpO2: 100 % (23 1425) Vital Signs (24h Range):  Temp:  [97 °F (36.1 °C)-99.7 °F (37.6 °C)] 99.7 °F (37.6 °C)  Pulse:  [108-214] 151  Resp:  [15-39] 18  SpO2:  [91 %-100 %] 100 %  BP: (102-153)/(0-102) 117/92  Arterial Line BP: ()/(70-93) 91/70     Patient Vitals for the past 72 hrs (Last 3 readings):   Weight   23 1017 95.5 kg (210 lb 8.6 oz)     Body mass index  "is 26.32 kg/m².      Intake/Output Summary (Last 24 hours) at 8/31/2023 1530  Last data filed at 8/31/2023 1500  Gross per 24 hour   Intake 120.01 ml   Output 1700 ml   Net -1579.99 ml          Physical Exam  Constitutional:       Appearance: Normal appearance.      Comments: GCS 10-11   HENT:      Head: Normocephalic and atraumatic.   Eyes:      Conjunctiva/sclera: Conjunctivae normal.      Comments: Pupils sluggishly reactive   Neck:      Vascular: JVD present.      Comments: JVP 15cm of H20 at 45 degrees  Cardiovascular:      Comments: VAD hum appreciated  Pulmonary:      Breath sounds: Normal breath sounds.      Comments: Clear to auscultation  Abdominal:      Comments: Soft, non-tender, slightly distended   Musculoskeletal:      Cervical back: Normal range of motion.      Comments: No peripheral edema   Skin:     General: Skin is warm and dry.      Capillary Refill: Capillary refill takes less than 2 seconds.   Neurological:      Mental Status: He is lethargic and confused.      Comments: GCS 10-11. Negative babinski.    Psychiatric:         Behavior: Behavior is uncooperative.            Significant Labs:  CBC:  Recent Labs   Lab 08/31/23  0449 08/31/23  0536 08/31/23  1046   WBC  --  11.55* 12.54   RBC  --  4.08* 3.56*   HGB  --  11.6* 10.1*   HCT 43 36.9* 31.7*   PLT  --  304 244   MCV  --  90.4 89   MCH  --  28.4 28.4   MCHC  --  31.4* 31.9*     BNP:  Recent Labs   Lab 08/31/23  1046   *     CMP:  Recent Labs   Lab 08/31/23  0536 08/31/23  1046 08/31/23  1250   GLU  --  203* 198*   CALCIUM 8.3* 8.3* 8.2*   ALBUMIN 3.1* 2.9* 2.8*   PROT  --  7.9 7.7    140 137   K 4.0 3.6 4.0   CO2 29 30* 26   CL  --  100 100   BUN 8.1* 8 8   CREATININE 1.37* 1.1 1.1   ALKPHOS 114 101 95   ALT <5 <5* <5*   AST 29 22 21   BILITOT 0.8 0.7 0.7      Coagulation:   Recent Labs   Lab 08/31/23  0536 08/31/23  1046   INR 1.2 1.3*     LDH:  No results for input(s): "LDH" in the last 72 hours.  Microbiology:  Microbiology " Results (last 7 days)       ** No results found for the last 168 hours. **            I have reviewed all pertinent labs within the past 24 hours.    Diagnostic Results:  I have reviewed all pertinent imaging results/findings within the past 24 hours.      Assessment/Plan:     * Embolic stroke involving left middle cerebral artery  - CTH with L parietal hypodensity with subtle areas of hyperdensity concerning for acute/subacute infarct with petechial hemorrhage, CTA negative for LVO or critical stenosis. Determined not a candidate for acute interventions with thrombolytics/thrombectomy.  - As per vascular neuro, continue aspirin and statin. Repeat CT brain tomorrow to determine whether to restart anticoagulation. Permissive HTN.   - PT, OT, Speech consulted.     Acute on chronic combined systolic and diastolic heart failure  - Last Echo (7/31/2023): HM3 at 5100. AV does not open. IV septum midline. EF 10-15%. LV severely dilated. Normal wall thickness. Severe global hypokinesis. RV not well visualized due to poor acoustic window. Biatrial enlargment. Mod TR.    - Home GDMT: valsartan 40mg bid  - Home diuretic regimen: Lasix 80mg daily.   - Hypervolemic on exam today. CVP 16 from the PICC line. Start IV lasix 80mg tid and will adjust accordingly based on repsonse.   - Ionotropes: continue home milrinone 0.25.   - Strict I&Os and daily weights.   - Maintain K>4 and Mg>2    Seizure-like activity  - loaded with keppra in ED prior to arrival.   - On propofol as sedation for mechanical ventilation.   - EEG ordered.   - Neurology consulted. Appreciate recommendations.     LVAD (left ventricular assist device) present  Procedure: Device Interrogation Including analysis of device parameters  Current Settings: Ventricular Assist Device  Review of device function is stable/unstable stable  Antiplatlet therapy w/ aspirin 81mg daily.   Anticoagulation w/ coumadin w/ goal INR 2-3. Held for now due to concern for petechial  hemorrhage. Will consider restarting tomorrow if repeat CT brain stable tomorrow.         8/31/2023     4:00 PM 8/31/2023     3:00 PM 8/31/2023     2:00 PM 8/31/2023     1:00 PM 8/31/2023    11:47 AM 8/31/2023    10:18 AM 8/8/2023     3:36 PM   TXP LVAD INTERROGATIONS   Type HeartMate3 HeartMate3 HeartMate3 HeartMate3 HeartMate3 HeartMate3 HeartMate3   Flow 4.2 4 4.1 4.1 3.9 3.7 4.6   Speed 5100 5100 5100 5100 5100 5100 5100   PI 3.6 4.4 4.6 4.3 5.8 6.5 3.1   Power (Serna) 3.6 3.5 3.6 3.5 3.7 3.7 3.5   LSL 4700 4700 4700 4700 4700 4700 4700   Pulsatility Intermittent pulse Intermittent pulse Intermittent pulse Intermittent pulse  Intermittent pulse Intermittent pulse       On mechanically assisted ventilation  Mechanically ventillatied for airway protection in the setting of AMS w/ stroke.     - Pulmonology consulted to assist with management.     Type 2 diabetes mellitus with hyperglycemia  - management as per endocrinology.         Chantal Herndon MD  Heart Transplant  [facility]

## 2023-08-31 NOTE — ASSESSMENT & PLAN NOTE
Mechanically ventillatied for airway protection in the setting of AMS w/ stroke.     - Pulmonology consulted to assist with management.

## 2023-08-31 NOTE — HPI
Reason for Consult: Management of T2DM, Hyperglycemia      Surgical Procedure and Date: LVAD 06/29/2022     Diabetes diagnosis year: 2022     Home Diabetes Medications:   -Levemir 22 units BID.   -Novolog 16 units TIDWM in addition to the following correction scale:     150 - 200 + 1 unit     201 - 250 + 2 units     251 - 300 + 3 units     301 - 350 + 4 units      > 350   + 5 units     How often checking glucose at home?  Uses Freestyle William    BG readings on regimen: 92-180s  Hypoglycemia on the regimen?  No  Missed doses on regimen?  occasionally skips mealsn and will skip Novolog with breakfast      Diabetes Complications include:     Hyperglycemia     Complicating diabetes co morbidities:   History of MI, CHF, and CKD        HPI: 38 year old male with a past medical history of CHF, DM, drug abuse, medication noncompliance, cardiomyopathy s/p LVAD, and seizures presented to ED on 8/31 for aphasia and right sided weakness. Hemorraghic stroke noted. Endocrine consulted to managed hyperglycemia and type 2 diabetes.

## 2023-08-31 NOTE — CONSULTS
[facility]  Adult Nutrition  Consult Note    SUMMARY     Recommendations    1.) If and when medically feasible, and alternative nutrition is medically warranted, recommend initiating TF of [employer] Peptide 1.5 at trickle rate and slowly advance to goal rate of 55ml/hr to provide 2030kcal, 98gPRO, and 924ml FF.     -note: this formula is vegan and pt follows a vegan lifestyle.     - if pt is on propofol (not vegan), recommend goal rate of TF [employer] 1.5@45ml/hr to provide 1661 kcal, 80gPRO, and 756ml FF + Kcal from Propofol.     - monitor for s/s of intolerance such as residuals >500ml, n/v/d, or abdominal distension.      2.) If and when medically able, recommend to ADAT, with goal of vegan diet.     3.) RD to monitor wt, nutritional status, skin, labs.      Goals: to meet % of EEN/EPN by next RD f/u  Nutrition Goal Status: new  Communication of RD Recs: other (comment) (POC)    Assessment and Plan    Nutrition Problem  Inadequate oral intake    Related to (etiology):   Inability to consume foods- sedated, on vent    Signs and Symptoms (as evidenced by):   NPO, need for EN     Interventions/Recommendations (treatment strategy):  Collaboration of nutritional care with other providers  ADAT vs EN    Nutrition Diagnosis Status:   New     Reason for Assessment    Reason For Assessment: consult  Diagnosis: stroke/CVA (Required emergent intubation)  Relevant Medical History: CHF, DM, drug abuse, medication noncompliance, cardiomyopathy s/p LVAD (6/2022), and seizures  Interdisciplinary Rounds: attended  General Information Comments: RD consulted for nutritional assessment. Pt is currently intubated, sedated. Prior nutritional history obtained by RD from chart review. As per last visit, pt previously stated that they follow a vegan lifestyle. Noted that pt is receiving Propofol which contains egg product. No sig wt changes in the past year as per chart review. NFPE not appropriate at  "this time.  Nutrition Discharge Planning: as per medical course    Nutrition/Diet History    Patient Reported Diet/Restrictions/Preferences:  (Vegan)    Anthropometrics    Temp: 99.7 °F (37.6 °C)  Height Method: Estimated  Height: 6' 3" (190.5 cm)  Height (inches): 75 in  Weight Method: Bed Scale  Weight: 95.5 kg (210 lb 8.6 oz)  Weight (lb): 210.54 lb  Ideal Body Weight (IBW), Male: 196 lb  % Ideal Body Weight, Male (lb): 107.42 %  BMI (Calculated): 26.3  BMI Grade: 25 - 29.9 - overweight    Lab/Procedures/Meds    Pertinent Labs Reviewed: reviewed  Pertinent Labs Comments: Gluc: 198, alb: 2.8, Ca: 8.2  Pertinent Medications Reviewed: reviewed  Pertinent Medications Comments: atorvastatin, lasix, insulin, KCl, fentanyl, milrinone, nicardipine, propofol    Estimated/Assessed Needs    Weight Used For Calorie Calculations: 95.5 kg (210 lb 8.6 oz)  Energy Calorie Requirements (kcal): 2156 kcal  Energy Need Method: Osorio-St Mortensen (MSJ x1.1)  Protein Requirements: 96- 115g (1.0-1.2g/kg)  Weight Used For Protein Calculations: 95.5 kg (210 lb 8.6 oz)  Fluid Requirements (mL): 1ml/1kcal or per MD  Estimated Fluid Requirement Method: RDA Method  RDA Method (mL): 2156  CHO Requirement: 270g    Nutrition Prescription Ordered    Current Diet Order: NPO    Evaluation of Received Nutrient/Fluid Intake    I/O: incomplete  Energy Calories Required: not meeting needs  Protein Required: not meeting needs  Fluid Required:  (as per MD)  Comments: No BM recorded  % Intake of Estimated Energy Needs: 0 - 25 %  % Meal Intake: NPO    Nutrition Risk    Level of Risk/Frequency of Follow-up:  (RD to f/u x1-2/week)     Monitor and Evaluation    Food and Nutrient Intake: energy intake  Food and Nutrient Adminstration: diet order  Physical Activity and Function: nutrition-related ADLs and IADLs  Anthropometric Measurements: weight, weight change, body mass index  Biochemical Data, Medical Tests and Procedures: electrolyte and renal panel, " gastrointestinal profile, glucose/endocrine profile, inflammatory profile, lipid profile  Nutrition-Focused Physical Findings: overall appearance, skin     Nutrition Follow-Up    RD Follow-up?: Yes

## 2023-08-31 NOTE — SUBJECTIVE & OBJECTIVE
Past Medical History:   Diagnosis Date    Acute respiratory failure with hypoxia 7/22/2023    Arthritis     Awareness alteration, transient 9/1/2022    Cardiomyopathy     CHF (congestive heart failure) 10/01/2020    COVID-19 6/3/2023    Diabetes mellitus     Dilated cardiomyopathy 10/26/2020    Drug abuse 10/2020    Headache 4/19/2023    Hyperglycemia 12/16/2022    Hyperosmolar hyperglycemic state (HHS) 5/25/2022    ICD (implantable cardioverter-defibrillator) in place 10/26/2020    Left ventricular assist device (LVAD) complication, initial encounter 6/5/2023    Muscle cramping 6/15/2022    Renal disorder     SOB (shortness of breath) 6/13/2022    Tingling in extremities 7/13/2022       Past Surgical History:   Procedure Laterality Date    APPLICATION OF WOUND VACUUM-ASSISTED CLOSURE DEVICE N/A 6/30/2022    Procedure: APPLICATION, WOUND VAC;  Surgeon: Luis F Paige MD;  Location: [facility] OR [address] [place] [address];  Service: Cardiovascular;  Laterality: N/A;  50 x 5 cm    CARDIAC DEFIBRILLATOR PLACEMENT      IMPLANTATION OF RIGHT VENTRICULAR ASSIST DEVICE (RVAD) N/A 6/29/2022    Procedure: INSERTION, RVAD;  Surgeon: Luis F Paige MD;  [facility];  Service: Cardiovascular;  Laterality: N/A;    INSERTION OF GRAFT TO PERICARDIUM Right 6/30/2022    Procedure: INSERTION-RIGHT VENTRICULAR ASSIST DEVICE;  Surgeon: Luis F Paige MD;  Location: [facility] OR 2ND FLR;  Service: Cardiovascular;  Laterality: Right;    IRRIGATION OF MEDIASTINUM  6/30/2022    Procedure: IRRIGATION, MEDIASTINUM;  Surgeon: Luis F Paige MD;  [facility];  Service: Cardiovascular;;    LEFT VENTRICULAR ASSIST DEVICE Left 6/23/2022    Procedure: INSERTION-LEFT VENTRICULAR ASSIST DEVICE;  Surgeon: Luis F Paige MD;  Location: [facility] OR [facility] FLR;  Service: Cardiovascular;  Laterality: Left;    LEFT VENTRICULAR ASSIST DEVICE N/A 6/29/2022    Procedure: INSERTION-LEFT VENTRICULAR ASSIST DEVICE;  Surgeon: Luis F Paige MD;  Location: [facility] OR [facility]  FLR;  Service: Cardiovascular;  Laterality: N/A;    RIGHT HEART CATHETERIZATION Right 4/8/2022    Procedure: INSERTION, CATHETER, RIGHT HEART;  Surgeon: Luca Lopez Jr., MD;  Location: [facility] CATH LAB;  Service: Cardiology;  Laterality: Right;    RIGHT HEART CATHETERIZATION Right 4/19/2022    Procedure: INSERTION, CATHETER, RIGHT HEART;  Surgeon: Josh Pulido MD;  Location: [facility] CATH LAB;  Service: Cardiology;  Laterality: Right;    RIGHT HEART CATHETERIZATION Right 7/21/2022    Procedure: INSERTION, CATHETER, RIGHT HEART;  Surgeon: Dalia Crum MD;  Location: [facility] CATH LAB;  Service: Cardiology;  Laterality: Right;    RIGHT HEART CATHETERIZATION Right 10/31/2022    Procedure: INSERTION, CATHETER, RIGHT HEART;  Surgeon: Dalia Crum MD;  Location: [facility] CATH LAB;  Service: Cardiology;  Laterality: Right;    STERNAL WOUND CLOSURE N/A 6/30/2022    Procedure: CLOSURE, WOUND, STERNUM;  Surgeon: Luis F Paige MD;  Location: [facility] OR [facility] FLR;  Service: Cardiovascular;  Laterality: N/A;       Review of patient's allergies indicates:   Allergen Reactions    Bumex [bumetanide] Hives    Torsemide Hives       Current Facility-Administered Medications   Medication    aspirin chewable tablet 81 mg    atorvastatin tablet 40 mg    ceFAZolin (ANCEF) 8 g in dextrose 5 % (D5W) 500 mL CONTINUOUS INFUSION    dextrose 10 % infusion    dextrose 10% bolus 125 mL 125 mL    dextrose 10% bolus 250 mL 250 mL    fentaNYL 2500 mcg in 0.9% sodium chloride 250 mL infusion premix (titrating)    furosemide injection 80 mg    glucagon (human recombinant) injection 1 mg    insulin aspart U-100 pen 0-5 Units    insulin detemir U-100 (Levemir) pen 10 Units    milrinone 20mg in D5W 100 mL infusion    mupirocin 2 % ointment    niCARdipine 40 mg/200 mL (0.2 mg/mL) infusion    phenylephrine HCl in 0.9% NaCl 1 mg/10 mL (100 mcg/mL) syringe    potassium chloride SA CR tablet 40 mEq    propofol (DIPRIVAN) 10 mg/mL infusion    propofoL  (DIPRIVAN) 10 mg/mL infusion    sodium chloride 0.9% flush 10 mL     Facility-Administered Medications Ordered in Other Encounters   Medication    etomidate injection    fentaNYL 50 mcg/mL injection    succinylcholine injection     Family History       Problem Relation (Age of Onset)    Diverticulosis Brother    Heart attack Maternal Grandmother, Maternal Grandfather    Heart failure Father          Tobacco Use    Smoking status: Former     Current packs/day: 0.00     Average packs/day: 0.5 packs/day for 16.6 years (8.3 ttl pk-yrs)     Types: Cigarettes     Start date: 10/1/2004     Quit date: 2021     Years since quittin.3    Smokeless tobacco: Never   Substance and Sexual Activity    Alcohol use: Not Currently    Drug use: Not Currently     Types: Marijuana, MDMA (Ecstacy)    Sexual activity: Yes     Partners: Female     Birth control/protection: None     Review of Systems Unable to be obtained due to medical condition.   Objective:     Vital Signs (Most Recent):  Temp: 99.7 °F (37.6 °C) (23 1149)  Pulse: (!) 151 (23 1425)  Resp: 18 (23 1425)  BP: (!) 117/92 (23 1200)  SpO2: 100 % (23 1425) Vital Signs (24h Range):  Temp:  [97 °F (36.1 °C)-99.7 °F (37.6 °C)] 99.7 °F (37.6 °C)  Pulse:  [108-214] 151  Resp:  [15-39] 18  SpO2:  [91 %-100 %] 100 %  BP: (102-153)/(0-102) 117/92  Arterial Line BP: ()/(70-93) 91/70     Patient Vitals for the past 72 hrs (Last 3 readings):   Weight   23 1017 95.5 kg (210 lb 8.6 oz)     Body mass index is 26.32 kg/m².      Intake/Output Summary (Last 24 hours) at 2023 1530  Last data filed at 2023 1500  Gross per 24 hour   Intake 120.01 ml   Output 1700 ml   Net -1579.99 ml          Physical Exam  Constitutional:       Appearance: Normal appearance.      Comments: GCS 10-11   HENT:      Head: Normocephalic and atraumatic.   Eyes:      Conjunctiva/sclera: Conjunctivae normal.      Comments: Pupils sluggishly reactive   Neck:       "Vascular: JVD present.      Comments: JVP 15cm of H20 at 45 degrees  Cardiovascular:      Comments: VAD hum appreciated  Pulmonary:      Breath sounds: Normal breath sounds.      Comments: Clear to auscultation  Abdominal:      Comments: Soft, non-tender, slightly distended   Musculoskeletal:      Cervical back: Normal range of motion.      Comments: No peripheral edema   Skin:     General: Skin is warm and dry.      Capillary Refill: Capillary refill takes less than 2 seconds.   Neurological:      Mental Status: He is lethargic and confused.      Comments: GCS 10-11. Negative babinski.    Psychiatric:         Behavior: Behavior is uncooperative.            Significant Labs:  CBC:  Recent Labs   Lab 08/31/23  0449 08/31/23  0536 08/31/23  1046   WBC  --  11.55* 12.54   RBC  --  4.08* 3.56*   HGB  --  11.6* 10.1*   HCT 43 36.9* 31.7*   PLT  --  304 244   MCV  --  90.4 89   MCH  --  28.4 28.4   MCHC  --  31.4* 31.9*     BNP:  Recent Labs   Lab 08/31/23  1046   *     CMP:  Recent Labs   Lab 08/31/23  0536 08/31/23  1046 08/31/23  1250   GLU  --  203* 198*   CALCIUM 8.3* 8.3* 8.2*   ALBUMIN 3.1* 2.9* 2.8*   PROT  --  7.9 7.7    140 137   K 4.0 3.6 4.0   CO2 29 30* 26   CL  --  100 100   BUN 8.1* 8 8   CREATININE 1.37* 1.1 1.1   ALKPHOS 114 101 95   ALT <5 <5* <5*   AST 29 22 21   BILITOT 0.8 0.7 0.7      Coagulation:   Recent Labs   Lab 08/31/23  0536 08/31/23  1046   INR 1.2 1.3*     LDH:  No results for input(s): "LDH" in the last 72 hours.  Microbiology:  Microbiology Results (last 7 days)       ** No results found for the last 168 hours. **            I have reviewed all pertinent labs within the past 24 hours.    Diagnostic Results:  I have reviewed all pertinent imaging results/findings within the past 24 hours.    "

## 2023-08-31 NOTE — ASSESSMENT & PLAN NOTE
Procedure: Device Interrogation Including analysis of device parameters  Current Settings: Ventricular Assist Device  Review of device function is stable/unstable stable  Antiplatlet therapy w/ aspirin 81mg daily.   Anticoagulation w/ coumadin w/ goal INR 2-3. Held for now due to concern for petechial hemorrhage. Will consider restarting tomorrow if repeat CT brain stable tomorrow.         8/31/2023     4:00 PM 8/31/2023     3:00 PM 8/31/2023     2:00 PM 8/31/2023     1:00 PM 8/31/2023    11:47 AM 8/31/2023    10:18 AM 8/8/2023     3:36 PM   TXP LVAD INTERROGATIONS   Type HeartMate3 HeartMate3 HeartMate3 HeartMate3 HeartMate3 HeartMate3 HeartMate3   Flow 4.2 4 4.1 4.1 3.9 3.7 4.6   Speed 5100 5100 5100 5100 5100 5100 5100   PI 3.6 4.4 4.6 4.3 5.8 6.5 3.1   Power (Serna) 3.6 3.5 3.6 3.5 3.7 3.7 3.5   LSL 4700 4700 4700 4700 4700 4700 4700   Pulsatility Intermittent pulse Intermittent pulse Intermittent pulse Intermittent pulse  Intermittent pulse Intermittent pulse

## 2023-08-31 NOTE — CONSULTS
[facility]  Endocrinology  Diabetes Consult Note    Consult Requested by: Luca Lopez Jr.*   Reason for admit: Embolic stroke involving left middle cerebral artery    HISTORY OF PRESENT ILLNESS:  Reason for Consult: Management of T2DM, Hyperglycemia      Surgical Procedure and Date: LVAD 06/29/2022     Diabetes diagnosis year: 2022     Home Diabetes Medications:   -Levemir 22 units BID.   -Novolog 16 units TIDWM in addition to the following correction scale:     150 - 200 + 1 unit     201 - 250 + 2 units     251 - 300 + 3 units     301 - 350 + 4 units      > 350   + 5 units     How often checking glucose at home?  Uses [employer]    BG readings on regimen: 92-180s  Hypoglycemia on the regimen?  No  Missed doses on regimen?  occasionally skips mealsn and will skip Novolog with breakfast      Diabetes Complications include:     Hyperglycemia     Complicating diabetes co morbidities:   History of MI, CHF, and CKD        HPI: 38 year old male with a past medical history of CHF, DM, drug abuse, medication noncompliance, cardiomyopathy s/p LVAD, and seizures presented to ED on 8/31 for aphasia and right sided weakness. Hemorraghic stroke noted. Endocrine consulted to managed hyperglycemia and type 2 diabetes.       Interval HPI:   Overnight events: No acute events overnight. Patient in room 21768/19649 A. Blood glucose stable. BG at goal on current insulin regimen (SSI ). Steroid use- None.    Renal function- Normal   Vasopressors-  None       Endocrine will continue to follow and manage insulin orders inpatient.         Diet NPO     Eating:   NPO  Nausea: No  Hypoglycemia and intervention: No  Fever: No  TPN and/or TF: No      PMH, PSH, FH, SH updated and reviewed     ROS:  Review of Systems  COLLIN due to intubation.   Current Medications and/or Treatments Impacting Glycemic Control  Immunotherapy:    Immunosuppressants       None          Steroids:   Hormones (From admission, onward)       None          Pressors:    Autonomic Drugs (From admission, onward)      None          Hyperglycemia/Diabetes Medications:   Antihyperglycemics (From admission, onward)      Start     Stop Route Frequency Ordered    08/31/23 1343  insulin aspart U-100 pen 0-5 Units         -- SubQ Every 4 hours PRN 08/31/23 1248    08/31/23 1300  insulin detemir U-100 (Levemir) pen 10 Units         -- SubQ 2 times daily 08/31/23 1248             PHYSICAL EXAMINATION:  Vitals:    08/31/23 1425   BP:    Pulse: (!) 151   Resp: 18   Temp:      Body mass index is 26.32 kg/m².     Physical Exam     Constitutional: Well developed, obese, NAD.  ENT: External ears no masses with nose patent  Neck: Supple; trachea midline  Cardiovascular: LVAD hum, no LE edema. DP +2 bilaterally.  Lungs: Normal effort; lungs anterior bilaterally clear to auscultation.  Intubated on a ventilator.  Abdomen: Soft, no masses, no hernias.  Hypoactive BS noted.  MS: No clubbing or cyanosis of nails noted; unable to assess gait.  Skin: No rashes, lesions, or ulcers; no nodules.  Injection sites are ok. No lipo hypertropthy or atrophy.    Psychiatric: COLLIN  Neurological: COLLIN  Foot: Nails in good condition, no amputations noted        Labs Reviewed and Include   Recent Labs   Lab 08/31/23  1250   *   CALCIUM 8.2*   ALBUMIN 2.8*   PROT 7.7      K 4.0   CO2 26      BUN 8   CREATININE 1.1   ALKPHOS 95   ALT <5*   AST 21   BILITOT 0.7     Lab Results   Component Value Date    WBC 12.54 08/31/2023    HGB 10.1 (L) 08/31/2023    HCT 31.7 (L) 08/31/2023    MCV 89 08/31/2023     08/31/2023     Recent Labs   Lab 08/31/23  0536 08/31/23  1634   TSH 4.954* 0.869     Lab Results   Component Value Date    HGBA1C 6.9 (H) 08/31/2023       Nutritional status:   Body mass index is 26.32 kg/m².  Lab Results   Component Value Date    ALBUMIN 2.8 (L) 08/31/2023    ALBUMIN 2.9 (L) 08/31/2023    ALBUMIN 3.1 (L) 08/31/2023     Lab Results   Component Value Date     PREALBUMIN 15 (L) 08/07/2023    PREALBUMIN 21 08/04/2023    PREALBUMIN 29 08/02/2023       Estimated Creatinine Clearance: 108.8 mL/min (based on SCr of 1.1 mg/dL).    Accu-Checks  Recent Labs     08/31/23  1037 08/31/23  1254 08/31/23  1633   POCTGLUCOSE 208* 199* 180*        ASSESSMENT and PLAN    Neuro  * Embolic stroke involving left middle cerebral artery  Managed per primary team  Avoid hypoglycemia        Cardiac/Vascular  LVAD (left ventricular assist device) present  Managed per primary team  Avoid hypoglycemia        Endocrine  Type 2 diabetes mellitus with hyperglycemia    Endocrinology consulted for BG management.   BG goal 140-180    - Levemir (Insulin Detemir) 9 units BID  - Novolog (Insulin Aspart) prn for BG excursions [facility] SSI (150/25)  - BG checks q4hr  - Hypoglycemia protocol in place  - If blood glucose greater than 300, please ask patient not to eat food or drink anything other than water until correctional insulin has brought it back below 250    ** Please notify Endocrine for any change and/or advance in diet**  ** Please call Endocrine for any BG related issues **    Discharge Planning:   TBD. Please notify endocrinology prior to discharge.          Plan discussed with patient, family, and RN at bedside.        Michael Wyman, DNP, FNP  Endocrinology  [facility]

## 2023-08-31 NOTE — RESPIRATORY THERAPY
Patient intubated at bedside with 8.0 ETT secured at 25cm at the teeth. Placed on ventilator with documented settings. NARN. Ambu and mask at bedside. Will continue to monitor.

## 2023-08-31 NOTE — ASSESSMENT & PLAN NOTE
Kevan Queen is a 38 y.o. male with pertinent medical history of DM, CHF, cardiomyopathy s/p LVAD and ICD, drug abuse, medication noncompliance, and seizures that was transferred from [place] with acute/subacute L MCA stroke for higher level care. Initially presented to with aphasia and RSW, LKN approx 8 hrs PTA. Patient's significant other also reported episode of seizure-like activity, described as rhythmic shaking. Of note patient reportedly had been out of coumadin x 5 days, which was finally refilled 8/30 and was given 2 pills to take that night. In OSH ED, documented to have seizure-like activity with rhythmic shaking of BUE. CTH with L parietal hypodensity with subtle areas of hyperdensity concerning for acute/subacute infarct with petechial hemorrhage, CTA negative for LVO or critical stenosis. Determined not a candidate for acute interventions with thrombolytics/thrombectomy.    Stroke code was activated at [facility] due to AMS and for expedited transfer to ICU for concerns of airway protection. Stat CTA stable compared to prior study. He was transferred to SICU and intubated for airway protection. Exam this afternoon limited due to intubation/sedation, patient not moving RUE to pain with triple flexion to all other extremities but corneal/cough/gag intact.    Recommendations:    Antithrombotics for secondary stroke prevention: Antiplatelets: Aspirin: 81 mg daily ok to continue at this time; recommend repeat CTH tomorrow AM (9/1) to monitor for stability, if area of petechial hemorrhage stable then OK to start heparin gtt for AC     Statins for secondary stroke prevention and HLD, if present: Statins: Atorvastatin- 40 mg daily    Aggressive risk factor modification: DM, Diet, Exercise, Obesity     Rehab efforts: The patient has been evaluated by a stroke team provider and the therapy needs have been fully considered based off the presenting complaints and exam findings. The following therapy evaluations are  needed: PT evaluate and treat, OT evaluate and treat, SLP evaluate and treat pending extubated    Diagnostics ordered/pending: CT scan of head without contrast to asses brain parenchyma, HgbA1C to assess blood glucose levels, Lipid Profile to assess cholesterol levels, TTE to assess cardiac function/status     VTE prophylaxis: Mechanical prophylaxis: Place SCDs  None: Reason for No Pharmacological VTE Prophylaxis: concern for petechial hemorrahge on CTH    BP parameters: Infarct: No intervention, SBP <220

## 2023-08-31 NOTE — SUBJECTIVE & OBJECTIVE
Interval HPI:   Overnight events: No acute events overnight. Patient in room 73848/53170 A. Blood glucose stable. BG at goal on current insulin regimen (SSI ). Steroid use- None.    Renal function- Normal   Vasopressors-  None       Endocrine will continue to follow and manage insulin orders inpatient.         Diet NPO     Eating:   NPO  Nausea: No  Hypoglycemia and intervention: No  Fever: No  TPN and/or TF: No      PMH, PSH, FH, SH updated and reviewed     ROS:  Review of Systems  COLLIN due to intubation.   Current Medications and/or Treatments Impacting Glycemic Control  Immunotherapy:    Immunosuppressants       None          Steroids:   Hormones (From admission, onward)      None          Pressors:    Autonomic Drugs (From admission, onward)      None          Hyperglycemia/Diabetes Medications:   Antihyperglycemics (From admission, onward)      Start     Stop Route Frequency Ordered    08/31/23 1343  insulin aspart U-100 pen 0-5 Units         -- SubQ Every 4 hours PRN 08/31/23 1248    08/31/23 1300  insulin detemir U-100 (Levemir) pen 10 Units         -- SubQ 2 times daily 08/31/23 1248             PHYSICAL EXAMINATION:  Vitals:    08/31/23 1425   BP:    Pulse: (!) 151   Resp: 18   Temp:      Body mass index is 26.32 kg/m².     Physical Exam     Constitutional: Well developed, obese, NAD.  ENT: External ears no masses with nose patent  Neck: Supple; trachea midline  Cardiovascular: LVAD hum, no LE edema. DP +2 bilaterally.  Lungs: Normal effort; lungs anterior bilaterally clear to auscultation.  Intubated on a ventilator.  Abdomen: Soft, no masses, no hernias.  Hypoactive BS noted.  MS: No clubbing or cyanosis of nails noted; unable to assess gait.  Skin: No rashes, lesions, or ulcers; no nodules.  Injection sites are ok. No lipo hypertropthy or atrophy.    Psychiatric: COLLIN  Neurological: COLLIN  Foot: Nails in good condition, no amputations noted

## 2023-08-31 NOTE — HPI
"Mr. Queen is a 38 year old male with PMHx of CHF, DM, substance use disorder, medication noncompliance, cardiomyopathy s/p LVAD and seizures who presented to OSH via EMS for altered mental status. Last known normal was 0200 on 8/31. Pt unable to follow commands and bilateral UE tremors were noted at that time. In ED he grew more lethargic. CT head showed hypodensity in L posterior parietal lobe with mass effect and possible acute to subacute infarct and petechial hemorrhage. CXR with cardiomegaly unchanged from previous imaging as well as worsening pulmonary edema. . POC CO2 31.  Pt was transferred to [facility] ED 2/2 VAD. Upon arrival to [facility], pt with possible seizures and intubated due to concern for airway protection.    Pulm consulted for "Vent managment in LVAD patient w/ stroke. Intubation for airway protection"  "

## 2023-09-01 PROBLEM — I50.41 ACUTE COMBINED SYSTOLIC AND DIASTOLIC CONGESTIVE HEART FAILURE: Status: ACTIVE | Noted: 2020-11-05

## 2023-09-01 PROBLEM — Z91.148 NONCOMPLIANCE WITH MEDICATION REGIMEN: Status: ACTIVE | Noted: 2023-09-01

## 2023-09-01 LAB
ALBUMIN SERPL BCP-MCNC: 2.6 G/DL (ref 3.5–5.2)
ALLENS TEST: ABNORMAL
ALLENS TEST: ABNORMAL
ALP SERPL-CCNC: 90 U/L (ref 55–135)
ALT SERPL W/O P-5'-P-CCNC: <5 U/L (ref 10–44)
ANION GAP SERPL CALC-SCNC: 10 MMOL/L (ref 8–16)
APTT PPP: 25.9 SEC (ref 21–32)
APTT PPP: 26.2 SEC (ref 21–32)
APTT PPP: 32.8 SEC (ref 21–32)
AST SERPL-CCNC: 17 U/L (ref 10–40)
BASOPHILS # BLD AUTO: 0.04 K/UL (ref 0–0.2)
BASOPHILS # BLD AUTO: 0.04 K/UL (ref 0–0.2)
BASOPHILS NFR BLD: 0.3 % (ref 0–1.9)
BASOPHILS NFR BLD: 0.4 % (ref 0–1.9)
BILIRUB SERPL-MCNC: 0.7 MG/DL (ref 0.1–1)
BUN SERPL-MCNC: 7 MG/DL (ref 6–20)
CALCIUM SERPL-MCNC: 8.4 MG/DL (ref 8.7–10.5)
CHLORIDE SERPL-SCNC: 99 MMOL/L (ref 95–110)
CO2 SERPL-SCNC: 31 MMOL/L (ref 23–29)
CREAT SERPL-MCNC: 1 MG/DL (ref 0.5–1.4)
DELSYS: ABNORMAL
DELSYS: ABNORMAL
DIFFERENTIAL METHOD: ABNORMAL
DIFFERENTIAL METHOD: ABNORMAL
EOSINOPHIL # BLD AUTO: 0 K/UL (ref 0–0.5)
EOSINOPHIL # BLD AUTO: 0.1 K/UL (ref 0–0.5)
EOSINOPHIL NFR BLD: 0.1 % (ref 0–8)
EOSINOPHIL NFR BLD: 0.5 % (ref 0–8)
ERYTHROCYTE [DISTWIDTH] IN BLOOD BY AUTOMATED COUNT: 19.9 % (ref 11.5–14.5)
ERYTHROCYTE [DISTWIDTH] IN BLOOD BY AUTOMATED COUNT: 19.9 % (ref 11.5–14.5)
ERYTHROCYTE [SEDIMENTATION RATE] IN BLOOD BY WESTERGREN METHOD: 12 MM/H
ERYTHROCYTE [SEDIMENTATION RATE] IN BLOOD BY WESTERGREN METHOD: 15 MM/H
EST. GFR  (NO RACE VARIABLE): >60 ML/MIN/1.73 M^2
FIO2: 40
FIO2: 40
GLUCOSE SERPL-MCNC: 113 MG/DL (ref 70–110)
HCO3 UR-SCNC: 30.7 MMOL/L (ref 24–28)
HCO3 UR-SCNC: 35.8 MMOL/L (ref 24–28)
HCT VFR BLD AUTO: 29.7 % (ref 40–54)
HCT VFR BLD AUTO: 32.6 % (ref 40–54)
HGB BLD-MCNC: 10.1 G/DL (ref 14–18)
HGB BLD-MCNC: 9.4 G/DL (ref 14–18)
IMM GRANULOCYTES # BLD AUTO: 0.02 K/UL (ref 0–0.04)
IMM GRANULOCYTES # BLD AUTO: 0.03 K/UL (ref 0–0.04)
IMM GRANULOCYTES NFR BLD AUTO: 0.2 % (ref 0–0.5)
IMM GRANULOCYTES NFR BLD AUTO: 0.3 % (ref 0–0.5)
INR PPP: 1.2 (ref 0.8–1.2)
INR PPP: 1.3 (ref 0.8–1.2)
LDH SERPL L TO P-CCNC: 291 U/L (ref 110–260)
LYMPHOCYTES # BLD AUTO: 2.1 K/UL (ref 1–4.8)
LYMPHOCYTES # BLD AUTO: 2.5 K/UL (ref 1–4.8)
LYMPHOCYTES NFR BLD: 17.7 % (ref 18–48)
LYMPHOCYTES NFR BLD: 23 % (ref 18–48)
MAGNESIUM SERPL-MCNC: 1.7 MG/DL (ref 1.6–2.6)
MCH RBC QN AUTO: 27 PG (ref 27–31)
MCH RBC QN AUTO: 27.6 PG (ref 27–31)
MCHC RBC AUTO-ENTMCNC: 31 G/DL (ref 32–36)
MCHC RBC AUTO-ENTMCNC: 31.6 G/DL (ref 32–36)
MCV RBC AUTO: 87 FL (ref 82–98)
MCV RBC AUTO: 87 FL (ref 82–98)
MIN VOL: 5.83
MODE: ABNORMAL
MODE: ABNORMAL
MONOCYTES # BLD AUTO: 0.8 K/UL (ref 0.3–1)
MONOCYTES # BLD AUTO: 0.9 K/UL (ref 0.3–1)
MONOCYTES NFR BLD: 7.3 % (ref 4–15)
MONOCYTES NFR BLD: 7.6 % (ref 4–15)
NEUTROPHILS # BLD AUTO: 7.5 K/UL (ref 1.8–7.7)
NEUTROPHILS # BLD AUTO: 8.8 K/UL (ref 1.8–7.7)
NEUTROPHILS NFR BLD: 68.3 % (ref 38–73)
NEUTROPHILS NFR BLD: 74.3 % (ref 38–73)
NRBC BLD-RTO: 0 /100 WBC
NRBC BLD-RTO: 0 /100 WBC
PCO2 BLDA: 39.5 MMHG (ref 35–45)
PCO2 BLDA: 40.4 MMHG (ref 35–45)
PEEP: 5
PEEP: 5
PH SMN: 7.5 [PH] (ref 7.35–7.45)
PH SMN: 7.56 [PH] (ref 7.35–7.45)
PIP: 20
PLATELET # BLD AUTO: 248 K/UL (ref 150–450)
PLATELET # BLD AUTO: 261 K/UL (ref 150–450)
PMV BLD AUTO: 10.3 FL (ref 9.2–12.9)
PMV BLD AUTO: 9.7 FL (ref 9.2–12.9)
PO2 BLDA: 146 MMHG (ref 80–100)
PO2 BLDA: 151 MMHG (ref 80–100)
POC BE: 14 MMOL/L
POC BE: 8 MMOL/L
POC PTINR: 1 (ref 0.9–1.2)
POC PTWBT: 12.4 SEC (ref 9.7–14.3)
POC SATURATED O2: 100 % (ref 95–100)
POC SATURATED O2: 99 % (ref 95–100)
POC TCO2: 32 MMOL/L (ref 23–27)
POC TCO2: 37 MMOL/L (ref 23–27)
POCT GLUCOSE: 123 MG/DL (ref 70–110)
POCT GLUCOSE: 124 MG/DL (ref 70–110)
POCT GLUCOSE: 161 MG/DL (ref 70–110)
POTASSIUM SERPL-SCNC: 2.9 MMOL/L (ref 3.5–5.1)
PROT SERPL-MCNC: 7.3 G/DL (ref 6–8.4)
PROTHROMBIN TIME: 13.2 SEC (ref 9–12.5)
PROTHROMBIN TIME: 13.7 SEC (ref 9–12.5)
RBC # BLD AUTO: 3.4 M/UL (ref 4.6–6.2)
RBC # BLD AUTO: 3.74 M/UL (ref 4.6–6.2)
SAMPLE: ABNORMAL
SAMPLE: ABNORMAL
SAMPLE: NORMAL
SITE: ABNORMAL
SITE: ABNORMAL
SODIUM SERPL-SCNC: 140 MMOL/L (ref 136–145)
VT: 480
VT: 530
WBC # BLD AUTO: 11.02 K/UL (ref 3.9–12.7)
WBC # BLD AUTO: 11.89 K/UL (ref 3.9–12.7)

## 2023-09-01 PROCEDURE — 99223 1ST HOSP IP/OBS HIGH 75: CPT | Mod: ,,, | Performed by: STUDENT IN AN ORGANIZED HEALTH CARE EDUCATION/TRAINING PROGRAM

## 2023-09-01 PROCEDURE — 36415 COLL VENOUS BLD VENIPUNCTURE: CPT | Performed by: STUDENT IN AN ORGANIZED HEALTH CARE EDUCATION/TRAINING PROGRAM

## 2023-09-01 PROCEDURE — 99223 PR INITIAL HOSPITAL CARE,LEVL III: ICD-10-PCS | Mod: ,,, | Performed by: STUDENT IN AN ORGANIZED HEALTH CARE EDUCATION/TRAINING PROGRAM

## 2023-09-01 PROCEDURE — 80177 DRUG SCRN QUAN LEVETIRACETAM: CPT | Mod: 91 | Performed by: STUDENT IN AN ORGANIZED HEALTH CARE EDUCATION/TRAINING PROGRAM

## 2023-09-01 PROCEDURE — C9113 INJ PANTOPRAZOLE SODIUM, VIA: HCPCS | Performed by: STUDENT IN AN ORGANIZED HEALTH CARE EDUCATION/TRAINING PROGRAM

## 2023-09-01 PROCEDURE — 25000003 PHARM REV CODE 250: Performed by: STUDENT IN AN ORGANIZED HEALTH CARE EDUCATION/TRAINING PROGRAM

## 2023-09-01 PROCEDURE — 99233 PR SUBSEQUENT HOSPITAL CARE,LEVL III: ICD-10-PCS | Mod: ,,, | Performed by: PSYCHIATRY & NEUROLOGY

## 2023-09-01 PROCEDURE — 83735 ASSAY OF MAGNESIUM: CPT | Performed by: STUDENT IN AN ORGANIZED HEALTH CARE EDUCATION/TRAINING PROGRAM

## 2023-09-01 PROCEDURE — 93750 PR INTERROGATE VENT ASSIST DEV, IN PERSON, W PHYSICIAN ANALYSIS: ICD-10-PCS | Mod: ,,, | Performed by: INTERNAL MEDICINE

## 2023-09-01 PROCEDURE — 20000000 HC ICU ROOM

## 2023-09-01 PROCEDURE — 63600175 PHARM REV CODE 636 W HCPCS: Performed by: STUDENT IN AN ORGANIZED HEALTH CARE EDUCATION/TRAINING PROGRAM

## 2023-09-01 PROCEDURE — 85610 PROTHROMBIN TIME: CPT | Performed by: STUDENT IN AN ORGANIZED HEALTH CARE EDUCATION/TRAINING PROGRAM

## 2023-09-01 PROCEDURE — 99232 SBSQ HOSP IP/OBS MODERATE 35: CPT | Mod: ,,, | Performed by: NURSE PRACTITIONER

## 2023-09-01 PROCEDURE — 93750 INTERROGATION VAD IN PERSON: CPT | Mod: ,,, | Performed by: INTERNAL MEDICINE

## 2023-09-01 PROCEDURE — 85730 THROMBOPLASTIN TIME PARTIAL: CPT | Mod: 91 | Performed by: INTERNAL MEDICINE

## 2023-09-01 PROCEDURE — 85730 THROMBOPLASTIN TIME PARTIAL: CPT | Performed by: STUDENT IN AN ORGANIZED HEALTH CARE EDUCATION/TRAINING PROGRAM

## 2023-09-01 PROCEDURE — 95714 VEEG EA 12-26 HR UNMNTR: CPT

## 2023-09-01 PROCEDURE — 27100171 HC OXYGEN HIGH FLOW UP TO 24 HOURS

## 2023-09-01 PROCEDURE — 80177 DRUG SCRN QUAN LEVETIRACETAM: CPT | Performed by: STUDENT IN AN ORGANIZED HEALTH CARE EDUCATION/TRAINING PROGRAM

## 2023-09-01 PROCEDURE — 99900035 HC TECH TIME PER 15 MIN (STAT)

## 2023-09-01 PROCEDURE — 95720 EEG PHY/QHP EA INCR W/VEEG: CPT | Mod: ,,, | Performed by: STUDENT IN AN ORGANIZED HEALTH CARE EDUCATION/TRAINING PROGRAM

## 2023-09-01 PROCEDURE — 99223 1ST HOSP IP/OBS HIGH 75: CPT | Mod: ,,, | Performed by: INTERNAL MEDICINE

## 2023-09-01 PROCEDURE — 99232 PR SUBSEQUENT HOSPITAL CARE,LEVL II: ICD-10-PCS | Mod: ,,, | Performed by: NURSE PRACTITIONER

## 2023-09-01 PROCEDURE — 99233 SBSQ HOSP IP/OBS HIGH 50: CPT | Mod: ,,, | Performed by: PSYCHIATRY & NEUROLOGY

## 2023-09-01 PROCEDURE — 99223 PR INITIAL HOSPITAL CARE,LEVL III: ICD-10-PCS | Mod: ,,, | Performed by: INTERNAL MEDICINE

## 2023-09-01 PROCEDURE — 82803 BLOOD GASES ANY COMBINATION: CPT

## 2023-09-01 PROCEDURE — 94003 VENT MGMT INPAT SUBQ DAY: CPT

## 2023-09-01 PROCEDURE — 99291 PR CRITICAL CARE, E/M 30-74 MINUTES: ICD-10-PCS | Mod: ,,, | Performed by: INTERNAL MEDICINE

## 2023-09-01 PROCEDURE — 95720 PR EEG, W/VIDEO, CONT RECORD, I&R, >12<26 HRS: ICD-10-PCS | Mod: ,,, | Performed by: STUDENT IN AN ORGANIZED HEALTH CARE EDUCATION/TRAINING PROGRAM

## 2023-09-01 PROCEDURE — 89220 SPUTUM SPECIMEN COLLECTION: CPT

## 2023-09-01 PROCEDURE — 85025 COMPLETE CBC W/AUTO DIFF WBC: CPT | Performed by: STUDENT IN AN ORGANIZED HEALTH CARE EDUCATION/TRAINING PROGRAM

## 2023-09-01 PROCEDURE — 99291 CRITICAL CARE FIRST HOUR: CPT | Mod: ,,, | Performed by: INTERNAL MEDICINE

## 2023-09-01 PROCEDURE — 27000248 HC VAD-ADDITIONAL DAY

## 2023-09-01 PROCEDURE — 37799 UNLISTED PX VASCULAR SURGERY: CPT

## 2023-09-01 PROCEDURE — 94761 N-INVAS EAR/PLS OXIMETRY MLT: CPT

## 2023-09-01 PROCEDURE — 82800 BLOOD PH: CPT

## 2023-09-01 PROCEDURE — 80053 COMPREHEN METABOLIC PANEL: CPT | Performed by: STUDENT IN AN ORGANIZED HEALTH CARE EDUCATION/TRAINING PROGRAM

## 2023-09-01 PROCEDURE — 83615 LACTATE (LD) (LDH) ENZYME: CPT | Performed by: STUDENT IN AN ORGANIZED HEALTH CARE EDUCATION/TRAINING PROGRAM

## 2023-09-01 RX ORDER — MAGNESIUM SULFATE HEPTAHYDRATE 40 MG/ML
2 INJECTION, SOLUTION INTRAVENOUS ONCE
Status: COMPLETED | OUTPATIENT
Start: 2023-09-01 | End: 2023-09-01

## 2023-09-01 RX ORDER — PANTOPRAZOLE SODIUM 40 MG/10ML
40 INJECTION, POWDER, LYOPHILIZED, FOR SOLUTION INTRAVENOUS DAILY
Status: DISCONTINUED | OUTPATIENT
Start: 2023-09-01 | End: 2023-09-04

## 2023-09-01 RX ORDER — POTASSIUM CHLORIDE 29.8 MG/ML
40 INJECTION INTRAVENOUS ONCE
Status: COMPLETED | OUTPATIENT
Start: 2023-09-01 | End: 2023-09-01

## 2023-09-01 RX ORDER — DEXMEDETOMIDINE HYDROCHLORIDE 4 UG/ML
0-1.4 INJECTION, SOLUTION INTRAVENOUS CONTINUOUS
Status: DISCONTINUED | OUTPATIENT
Start: 2023-09-01 | End: 2023-09-04

## 2023-09-01 RX ORDER — WARFARIN 4 MG/1
4 TABLET ORAL DAILY
Status: DISCONTINUED | OUTPATIENT
Start: 2023-09-01 | End: 2023-09-06

## 2023-09-01 RX ORDER — DEXMEDETOMIDINE HYDROCHLORIDE 4 UG/ML
INJECTION, SOLUTION INTRAVENOUS
Status: DISPENSED
Start: 2023-09-01 | End: 2023-09-02

## 2023-09-01 RX ORDER — FUROSEMIDE 10 MG/ML
80 INJECTION INTRAMUSCULAR; INTRAVENOUS EVERY 8 HOURS
Status: DISCONTINUED | OUTPATIENT
Start: 2023-09-01 | End: 2023-09-02

## 2023-09-01 RX ORDER — LEVETIRACETAM 500 MG/5ML
1000 INJECTION, SOLUTION, CONCENTRATE INTRAVENOUS EVERY 12 HOURS
Status: DISCONTINUED | OUTPATIENT
Start: 2023-09-01 | End: 2023-09-04

## 2023-09-01 RX ORDER — HEPARIN SODIUM,PORCINE/D5W 25000/250
0-40 INTRAVENOUS SOLUTION INTRAVENOUS CONTINUOUS
Status: DISCONTINUED | OUTPATIENT
Start: 2023-09-01 | End: 2023-09-11

## 2023-09-01 RX ORDER — FENTANYL CITRATE 50 UG/ML
25 INJECTION, SOLUTION INTRAMUSCULAR; INTRAVENOUS
Status: DISCONTINUED | OUTPATIENT
Start: 2023-09-01 | End: 2023-09-02

## 2023-09-01 RX ORDER — HALOPERIDOL 5 MG/ML
INJECTION INTRAMUSCULAR
Status: DISPENSED
Start: 2023-09-01 | End: 2023-09-02

## 2023-09-01 RX ADMIN — PROPOFOL 35.08 MCG/KG/MIN: 10 INJECTION, EMULSION INTRAVENOUS at 12:09

## 2023-09-01 RX ADMIN — HEPARIN SODIUM 12 UNITS/KG/HR: 10000 INJECTION, SOLUTION INTRAVENOUS at 03:09

## 2023-09-01 RX ADMIN — ASPIRIN 81 MG 81 MG: 81 TABLET ORAL at 10:09

## 2023-09-01 RX ADMIN — SENNOSIDES 8.6 MG: 8.6 TABLET, FILM COATED ORAL at 10:09

## 2023-09-01 RX ADMIN — LEVETIRACETAM 1000 MG: 100 INJECTION, SOLUTION INTRAVENOUS at 09:09

## 2023-09-01 RX ADMIN — ATORVASTATIN CALCIUM 40 MG: 20 TABLET, FILM COATED ORAL at 10:09

## 2023-09-01 RX ADMIN — FUROSEMIDE 80 MG: 10 INJECTION, SOLUTION INTRAVENOUS at 01:09

## 2023-09-01 RX ADMIN — PROPOFOL 45 MCG/KG/MIN: 10 INJECTION, EMULSION INTRAVENOUS at 08:09

## 2023-09-01 RX ADMIN — MUPIROCIN: 20 OINTMENT TOPICAL at 10:09

## 2023-09-01 RX ADMIN — POTASSIUM CHLORIDE 40 MEQ: 29.8 INJECTION, SOLUTION INTRAVENOUS at 10:09

## 2023-09-01 RX ADMIN — MUPIROCIN: 20 OINTMENT TOPICAL at 09:09

## 2023-09-01 RX ADMIN — MAGNESIUM SULFATE 2 G: 2 INJECTION INTRAVENOUS at 04:09

## 2023-09-01 RX ADMIN — PROPOFOL 35 MCG/KG/MIN: 10 INJECTION, EMULSION INTRAVENOUS at 05:09

## 2023-09-01 RX ADMIN — DEXMEDETOMIDINE HYDROCHLORIDE 0.2 MCG/KG/HR: 4 INJECTION INTRAVENOUS at 07:09

## 2023-09-01 RX ADMIN — POTASSIUM CHLORIDE 40 MEQ: 400 INJECTION, SOLUTION INTRAVENOUS at 04:09

## 2023-09-01 RX ADMIN — PANTOPRAZOLE SODIUM 40 MG: 40 INJECTION, POWDER, FOR SOLUTION INTRAVENOUS at 10:09

## 2023-09-01 RX ADMIN — POLYETHYLENE GLYCOL 3350 17 G: 17 POWDER, FOR SOLUTION ORAL at 10:09

## 2023-09-01 RX ADMIN — FENTANYL CITRATE 25 MCG: 50 INJECTION INTRAMUSCULAR; INTRAVENOUS at 01:09

## 2023-09-01 RX ADMIN — FUROSEMIDE 80 MG: 10 INJECTION, SOLUTION INTRAVENOUS at 09:09

## 2023-09-01 RX ADMIN — LEVETIRACETAM 1000 MG: 100 INJECTION, SOLUTION INTRAVENOUS at 10:09

## 2023-09-01 NOTE — ASSESSMENT & PLAN NOTE
Endocrinology consulted for BG management.   BG goal 140-180    - Decrease Levemir (Insulin Detemir) to 8 units BID  - Novolog (Insulin Aspart) prn for BG excursions MDC SSI (150/25)  - BG checks q4hr  - Hypoglycemia protocol in place  - If blood glucose greater than 300, please ask patient not to eat food or drink anything other than water until correctional insulin has brought it back below 250    ** Please notify Endocrine for any change and/or advance in diet**  ** Please call Endocrine for any BG related issues **    Discharge Planning:   TBD. Please notify endocrinology prior to discharge.

## 2023-09-01 NOTE — NURSING
Rounded on patient at bedside. Patient is intubated and sedated, no family at bedside.  LVAD history interrogated with no abnormal events noted.  LVAD parameters WNL. Will continue to round on patinet.

## 2023-09-01 NOTE — PROGRESS NOTES
09/01/2023  Mirela Perez    Current provider:  Luca Lopez Jr.*    Device interrogation:      9/1/2023    11:00 AM 9/1/2023    10:00 AM 9/1/2023     9:00 AM 9/1/2023     8:00 AM 9/1/2023     7:00 AM 9/1/2023     6:00 AM 9/1/2023     5:00 AM   TXP LVAD INTERROGATIONS   Type HeartMate3 HeartMate3 HeartMate3 HeartMate3 HeartMate3 HeartMate3 HeartMate3   Flow 4.2 4.2 4.2 4.1 4.2 4.4 4.4   Speed 5100 5100 5100 5100 5100 5150 5100   PI 4.4 4.5 4.4 4.6 4.3 3.8 3.43   Power (Serna) 3.5 3.5 3.5 3.5 3.5 3.5 3.5   LSL 4700 4700 4700 4700 4700 4700 4700   Pulsatility Intermittent pulse Intermittent pulse Intermittent pulse Intermittent pulse  Intermittent pulse Intermittent pulse          Rounded on Kevan Queen to ensure all mechanical assist device settings (IABP or VAD) were appropriate and all parameters were within limits.  I was able to ensure all back up equipment was present, the staff had no issues, and the Perfusion Department daily rounding was complete.      For implantable VADs: Interrogation of Ventricular assist device was performed with analysis of device parameters and review of device function. I have personally reviewed the interrogation findings and agree with findings as stated.     In emergency, the nursing units have been notified to contact the perfusion department either by:  Calling [phone] from 630am to 4pm Mon thru Fri, utilizing the On-Call Finder functionality of Epic and searching for Perfusion, or by contacting the hospital  from 4pm to 630am and on weekends and asking to speak with the perfusionist on call.    12:46 PM

## 2023-09-01 NOTE — ASSESSMENT & PLAN NOTE
- CTH with L parietal hypodensity with subtle areas of hyperdensity concerning for acute/subacute infarct with petechial hemorrhage, CTA negative for LVO or critical stenosis. Determined not a candidate for acute interventions with thrombolytics/thrombectomy.  - As per vascular neuro, continue aspirin and statin. Repeat CT brain pending today to decided whether to restart anticoagulation. Permissive HTN.   - PT, OT, Speech consulted.

## 2023-09-01 NOTE — PROGRESS NOTES
[facility] - Surgical Intensive Care  Pulmonology  Progress Note    Patient Name: Kevan Queen  MRN: [id number]  Admission Date: 8/31/2023  Hospital Length of Stay: 1 days  Code Status: Full Code  Attending Provider: Luca Lopez Jr.*  Primary Care Provider: Rosio Armendariz FNP   Principal Problem: Embolic stroke involving left middle cerebral artery    Subjective:     Interval History: No acute events overnight. On EEG study. Plan for repeat head CT today. On propofol, fentanyl for sedation during intubation.    Objective:     Vital Signs (Most Recent):  Temp: 98.2 °F (36.8 °C) (09/01/23 0300)  Pulse: (!) 178 (09/01/23 1030)  Resp: 12 (09/01/23 1030)  BP: 94/64 (09/01/23 0326)  SpO2: 100 % (09/01/23 1030) Vital Signs (24h Range):  Temp:  [97.9 °F (36.6 °C)-98.9 °F (37.2 °C)] 98.2 °F (36.8 °C)  Pulse:  [] 178  Resp:  [12-34] 12  SpO2:  [78 %-100 %] 100 %  BP: ()/(0-76) 94/64  Arterial Line BP: ()/(64-93) 87/74     Weight: 96.6 kg (213 lb)  Body mass index is 26.62 kg/m².      Intake/Output Summary (Last 24 hours) at 9/1/2023 1211  Last data filed at 9/1/2023 1100  Gross per 24 hour   Intake 1283.76 ml   Output 5010 ml   Net -3726.24 ml        Physical Exam  Constitutional:       Appearance: He is ill-appearing.   HENT:      Head: Normocephalic and atraumatic.      Mouth/Throat:      Mouth: Mucous membranes are moist.      Pharynx: Oropharynx is clear.   Eyes:      Comments: Pupils pinpoint, nonreactive to light, pt sedated   Cardiovascular:      Rate and Rhythm: Tachycardia present.      Comments: Loud heart sounds with LVAD  Pulmonary:      Effort: Pulmonary effort is normal.      Breath sounds: Normal breath sounds. No wheezing, rhonchi or rales.      Comments: Vent in place  Abdominal:      General: Abdomen is flat.      Palpations: Abdomen is soft.   Musculoskeletal:      Right lower leg: No edema.      Left lower leg: No edema.   Skin:     General: Skin is warm and dry.   Neurological:       Comments: Intubated and sedated.           Review of Systems   Unable to perform ROS: Intubated       Vents:  Vent Mode: A/C (09/01/23 1145)  Ventilator Initiated: Yes (08/31/23 1224)  Set Rate: 12 BPM (09/01/23 1145)  Vt Set: 480 mL (09/01/23 1145)  PEEP/CPAP: 5 cmH20 (09/01/23 1145)  Oxygen Concentration (%): 40 (09/01/23 1145)  Peak Airway Pressure: 3.8 cmH20 (09/01/23 1145)  Plateau Pressure: 0 cmH20 (09/01/23 1145)  Total Ve: 5.53 L/m (09/01/23 1145)  Negative Inspiratory Force (cm H2O): 0 (09/01/23 1145)  F/VT Ratio<105 (RSBI): (!) 30.61 (09/01/23 0912)    Lines/Drains/Airways       Peripherally Inserted Central Catheter Line  Duration             PICC Double Lumen 08/01/23 31 days    PICC Double Lumen 08/01/23 1121 right basilic 31 days              Drain  Duration                  NG/OG Tube 08/31/23 1239 [place] sump 18 Fr. Center mouth <1 day         Urethral Catheter 08/31/23 1230 <1 day              Airway  Duration                  Airway - Non-Surgical 08/31/23 1224 Endotracheal Tube <1 day       Airway Anesthesia 08/31/23 <1 day              Arterial Line  Duration             Arterial Line 08/31/23 1239 Right Radial <1 day              Line  Duration                  VAD 06/29/22 1115 Left ventricular assist device HeartMate 3 429 days              Peripheral Intravenous Line  Duration                  Peripheral IV - Single Lumen 08/31/23 18 G Left Antecubital 1 day                    Significant Labs:    CBC/Anemia Profile:  Recent Labs   Lab 08/31/23  0536 08/31/23  1046 09/01/23  0311   WBC 11.55* 12.54 11.89   HGB 11.6* 10.1* 9.4*   HCT 36.9* 31.7* 29.7*    244 248   MCV 90.4 89 87   RDW 20.2* 19.8* 19.9*        Chemistries:  Recent Labs   Lab 08/31/23  0536 08/31/23  1046 08/31/23  1046 08/31/23  1250 08/31/23 2011 09/01/23  0311    140   < > 137 140 140   K 4.0 3.6   < > 4.0 3.4* 2.9*   CL  --  100   < > 100 100 99   CO2 29 30*   < > 26 28 31*   BUN 8.1* 8   < > 8 8 7    CREATININE 1.37* 1.1   < > 1.1 1.1 1.0   CALCIUM 8.3* 8.3*   < > 8.2* 8.5* 8.4*   ALBUMIN 3.1* 2.9*  --  2.8*  --  2.6*   PROT  --  7.9  --  7.7  --  7.3   BILITOT 0.8 0.7  --  0.7  --  0.7   ALKPHOS 114 101  --  95  --  90   ALT <5 <5*  --  <5*  --  <5*   AST 29 22  --  21  --  17   GLUCOSE 227*  --   --   --   --   --    MG  --   --   --   --  1.3* 1.7    < > = values in this interval not displayed.       All pertinent labs within the past 24 hours have been reviewed.    Significant Imaging:  I have reviewed all pertinent imaging results/findings within the past 24 hours.    Assessment/Plan:     Pulmonary  Required emergent intubation  Presented with altered mental status at mcg/kg/min OSH and found to have stroke. Transferred to [facility] due to LVAD history and vomited on arrival. Possible seizures. Intubated for airway protection. CXR with cardiomegaly however poor penetration so difficult to assess pulmonary edema.  Last EF 10-15% s/p LVAD. Recent COVID, no recent ARDS, COPD, asthma. Does not appear to have acute insult to lungs.    ABG from this mornin.556 High      POC PCO2 35 - 45 mmHg 40.4    POC PO2 80 - 100 mmHg 146 High     POC HCO3 24 - 28 mmol/L 35.8 High     POC BE -2 to 2 mmol/L 14    POC SATURATED O2 95 - 100 % 100    POC TCO2 23 - 27 mmol/L 37 High     Rate  15    Sample  ARTERIAL      - Respiratory rate to 12 and tidal volume to <500 mL  - PEEP of 5, recent COVID infection but no signs of ARDS  - Secretion management  - Repeat ABG ordered prior to SAT/SBT           Alexandra Rader MD  Pulmonology  [facility] - Surgical Intensive Care

## 2023-09-01 NOTE — PT/OT/SLP PROGRESS
Physical Therapy      Patient Name:  Kevan Queen   MRN:  [id number]    Patient not seen today secondary to  (pt on hold due to being intubated and sedated.). Will follow-up at a later date.    9/1/2023  .

## 2023-09-01 NOTE — SUBJECTIVE & OBJECTIVE
Past Medical History:   Diagnosis Date    Acute respiratory failure with hypoxia 7/22/2023    Arthritis     Awareness alteration, transient 9/1/2022    Cardiomyopathy     CHF (congestive heart failure) 10/01/2020    COVID-19 6/3/2023    Diabetes mellitus     Dilated cardiomyopathy 10/26/2020    Drug abuse 10/2020    Headache 4/19/2023    Hyperglycemia 12/16/2022    Hyperosmolar hyperglycemic state (HHS) 5/25/2022    ICD (implantable cardioverter-defibrillator) in place 10/26/2020    Left ventricular assist device (LVAD) complication, initial encounter 6/5/2023    Muscle cramping 6/15/2022    Renal disorder     SOB (shortness of breath) 6/13/2022    Tingling in extremities 7/13/2022     Past Surgical History:   Procedure Laterality Date    APPLICATION OF WOUND VACUUM-ASSISTED CLOSURE DEVICE N/A 6/30/2022    Procedure: APPLICATION, WOUND VAC;  Surgeon: Luis F Paige MD;  Location: [facility] OR [address] [place] [address];  Service: Cardiovascular;  Laterality: N/A;  50 x 5 cm    CARDIAC DEFIBRILLATOR PLACEMENT      IMPLANTATION OF RIGHT VENTRICULAR ASSIST DEVICE (RVAD) N/A 6/29/2022    Procedure: INSERTION, RVAD;  Surgeon: Luis F Paige MD;  [facility];  Service: Cardiovascular;  Laterality: N/A;    INSERTION OF GRAFT TO PERICARDIUM Right 6/30/2022    Procedure: INSERTION-RIGHT VENTRICULAR ASSIST DEVICE;  Surgeon: Luis F Paige MD;  Location: [facility] OR 2ND FLR;  Service: Cardiovascular;  Laterality: Right;    IRRIGATION OF MEDIASTINUM  6/30/2022    Procedure: IRRIGATION, MEDIASTINUM;  Surgeon: Luis F Paige MD;  [facility];  Service: Cardiovascular;;    LEFT VENTRICULAR ASSIST DEVICE Left 6/23/2022    Procedure: INSERTION-LEFT VENTRICULAR ASSIST DEVICE;  Surgeon: Luis F Paige MD;  Location: [facility] OR [facility] FLR;  Service: Cardiovascular;  Laterality: Left;    LEFT VENTRICULAR ASSIST DEVICE N/A 6/29/2022    Procedure: INSERTION-LEFT VENTRICULAR ASSIST DEVICE;  Surgeon: Luis F Paige MD;  Location: [facility] OR [facility]  FLR;  Service: Cardiovascular;  Laterality: N/A;    RIGHT HEART CATHETERIZATION Right 2022    Procedure: INSERTION, CATHETER, RIGHT HEART;  Surgeon: Luca Lopez Jr., MD;  Location: [facility] CATH LAB;  Service: Cardiology;  Laterality: Right;    RIGHT HEART CATHETERIZATION Right 2022    Procedure: INSERTION, CATHETER, RIGHT HEART;  Surgeon: Josh Pulido MD;  Location: [facility] CATH LAB;  Service: Cardiology;  Laterality: Right;    RIGHT HEART CATHETERIZATION Right 2022    Procedure: INSERTION, CATHETER, RIGHT HEART;  Surgeon: Dalia Crum MD;  Location: [facility] CATH LAB;  Service: Cardiology;  Laterality: Right;    RIGHT HEART CATHETERIZATION Right 10/31/2022    Procedure: INSERTION, CATHETER, RIGHT HEART;  Surgeon: Dalia Crmu MD;  Location: [facility] CATH LAB;  Service: Cardiology;  Laterality: Right;    STERNAL WOUND CLOSURE N/A 2022    Procedure: CLOSURE, WOUND, STERNUM;  Surgeon: Luis F Paige MD;  Location: [facility] OR [facility] FLR;  Service: Cardiovascular;  Laterality: N/A;     Family History   Problem Relation Age of Onset    Heart failure Father     Diverticulosis Brother     Heart attack Maternal Grandmother     Heart attack Maternal Grandfather      Social History     Tobacco Use    Smoking status: Former     Current packs/day: 0.00     Average packs/day: 0.5 packs/day for 16.6 years (8.3 ttl pk-yrs)     Types: Cigarettes     Start date: 10/1/2004     Quit date: 2021     Years since quittin.3    Smokeless tobacco: Never   Substance Use Topics    Alcohol use: Not Currently    Drug use: Not Currently     Types: Marijuana, MDMA (Ecstacy)     Review of patient's allergies indicates:   Allergen Reactions    Bumex [bumetanide] Hives    Torsemide Hives       Medications: I have reviewed the current medication administration record.    Medications Prior to Admission   Medication Sig Dispense Refill Last Dose    aspirin (ECOTRIN) 81 MG EC tablet Take 1 tablet (81 mg total) by mouth  "once daily. 90 tablet 3     blood sugar diagnostic Strp Use to test blood glucose 4 (four) times daily. 200 each 5     blood-glucose meter Misc Use as instructed 1 each 0     busPIRone (BUSPAR) 10 MG tablet Take 10 mg by mouth 2 (two) times daily.       dextrose 5 % (D5W) SolP 500 mL with ceFAZolin 1 gram SolR 8 g per day IV continuously       furosemide (LASIX) 80 MG tablet Take 1 tablet (80 mg total) by mouth once daily. 30 tablet 11     insulin aspart U-100 (NOVOLOG) 100 unit/mL (3 mL) InPn pen Inject 14 Units into the skin 3 (three) times daily with meals. Plus Sliding Scale: 151-200: +1, 201-250: +2, 251-300: +3, 301-350: +4 and call MD 18 mL 5     insulin detemir U-100, Levemir, 100 unit/mL (3 mL) SubQ InPn pen Inject 24 Units into the skin 2 (two) times daily. (Patient not taking: Reported on 8/9/2023) 15 mL 5     lancets 33 gauge Misc Use to test blood glucose 4 (four) times daily. 200 each 3     levETIRAcetam (KEPPRA) 1000 MG tablet Take 1 tablet (1,000 mg total) by mouth 2 (two) times daily. 60 tablet 11     milrinone 20mg/100ml D5W, 200mcg/ml, (PRIMACOR) 20 mg/100 mL (200 mcg/mL) infusion Inject 34.875 mcg/min into the vein continuous.       mirtazapine (REMERON) 15 MG tablet Take 1 tablet (15 mg total) by mouth nightly. 30 tablet 11     pen needle, diabetic 32 gauge x 5/32" Ndle Use to inject insulin 5 (five) times daily. 200 each 5     valsartan (DIOVAN) 40 MG tablet Take 1 tablet (40 mg total) by mouth 2 (two) times daily. 180 tablet 3     warfarin (COUMADIN) 3 MG tablet Take 1.5 tablets (4.5mg) orally daily, except 2 tablets (6mg) on Wednesdays and Saturdays 60 tablet 5        Review of Systems   Unable to perform ROS: Intubated     Objective:     Vital Signs (Most Recent):  Temp: 98.2 °F (36.8 °C) (09/01/23 0300)  Pulse: 86 (09/01/23 1227)  Resp: 12 (09/01/23 1227)  BP: 94/64 (09/01/23 0326)  SpO2: 100 % (09/01/23 1030)    Vital Signs Range (Last 24H):  Temp:  [97.9 °F (36.6 °C)-98.9 °F (37.2 °C)] "   Pulse:  []   Resp:  [12-25]   BP: ()/(0-76)   SpO2:  [78 %-100 %]   Arterial Line BP: (76-99)/(64-87)        Physical Exam  Vitals and nursing note reviewed.   Constitutional:       Appearance: He is ill-appearing.      Interventions: He is intubated.   HENT:      Head: Normocephalic.      Nose: Nose normal.   Eyes:      Extraocular Movements: Extraocular movements intact.      Conjunctiva/sclera: Conjunctivae normal.   Cardiovascular:      Rate and Rhythm: Normal rate.   Pulmonary:      Effort: He is intubated.   Abdominal:      General: There is no distension.   Musculoskeletal:         General: No deformity.   Skin:     General: Skin is warm.   Neurological:      Cranial Nerves: No dysarthria or facial asymmetry.      Sensory: Sensory deficit present.      Motor: Weakness present.      Comments: Exam limited due to intubation/sedation. Does not follow commands. Grimaces to pain in RUE but no movement, triple flexion to pain in LUE and BLE. + corneals/cough/gag              Neurological Exam:   LOC: intubated/sedated  Attention Span: poor  Language: Intubated  Articulation: Untestable due to intubation  Orientation: Untestable due to intubation  Pupils (CN II, III): PERRL  Gag Reflex: present  Motor: Arm left  Paresis: 1/5  Leg left  Paresis: 1/5  Arm right  Plegia 0/5  Leg right Paresis: 1/5  Sensation: Paul-hypoesthesia right      Laboratory:  CMP:   Recent Labs   Lab 09/01/23  0311   CALCIUM 8.4*   ALBUMIN 2.6*   PROT 7.3      K 2.9*   CO2 31*   CL 99   BUN 7   CREATININE 1.0   ALKPHOS 90   ALT <5*   AST 17   BILITOT 0.7       CBC:   Recent Labs   Lab 09/01/23  0311   WBC 11.89   RBC 3.40*   HGB 9.4*   HCT 29.7*      MCV 87   MCH 27.6   MCHC 31.6*       Lipid Panel:   Recent Labs   Lab 08/31/23  1634   CHOL 118*   LDLCALC 63.2   HDL 37*   TRIG 89       Coagulation:   Recent Labs   Lab 09/01/23  0311   INR 1.3*   APTT 25.9       Hgb A1C:   Recent Labs   Lab 08/31/23  1634   HGBA1C 6.9*        TSH:   Recent Labs   Lab 08/31/23  1634   TSH 0.869         Diagnostic Results:    Brain/Vessel imaging:  CTA stroke multiphase 8/31/2023 1145  Impression:   Left parietal edema, nonspecific.  It is unclear whether this represents a recent infarct, focus of edema from demyelination or encephalitis, or other underlying lesion.  No midline shift or herniation.  Questionable very mild adjacent petechial hemorrhage with no focal hematoma.   No significant stenosis at the carotid bifurcations by NASCET criteria the vertebral arteries are patent.  No evidence of dissection.   No major branch stenosis/occlusion at the lflops-rm-[id number].  No evidence of venous thrombosis.    CTA stroke multiphase 8/31/2023 0504 @ OSH   Impression:  No large vessel occlusion or flow-limiting stenosis.  No significant change from the [facility] interpretation.    CTH without contrast 8/31/2023 0453 @ OSH  Impression:  Findings suspicious for acute to subacute left parietal infarct with possible petechial hemorrhage.  No significant discrepancy with the preliminary report.    CTH without 9/1/2023   Stable edema in the left parietal lobe.  Again it is unclear whether this reflects a recent infarct or infectious/inflammatory process including cerebritis.         Cardiac Evaluation:   TTE 7/31/2023    LVAD: There is a Heartmate III LVAD running at 5100 RPM. The aortic valve does not open. The ventricular septum is at midline.    Left Ventricle: The left ventricle is severely dilated. Normal wall thickness. Severe global hypokinesis present. There is severely reduced systolic function with a visually estimated ejection fraction of 10 -15%. There is diastolic dysfunction.    Right Ventricle: Right ventricle was not well visualized due to poor acoustic window.    Biatrial enlargement.    Tricuspid Valve: There is moderate transvalvular regurgitation.    Estimated PASP is 33 mmHg.    IVC/SVC: Normal venous pressure at 3 mmHg.

## 2023-09-01 NOTE — SUBJECTIVE & OBJECTIVE
Interval History: No acute events overnight. On EEG study. Plan for repeat head CT today. On propofol, fentanyl for sedation during intubation.    Objective:     Vital Signs (Most Recent):  Temp: 98.2 °F (36.8 °C) (09/01/23 0300)  Pulse: (!) 178 (09/01/23 1030)  Resp: 12 (09/01/23 1030)  BP: 94/64 (09/01/23 0326)  SpO2: 100 % (09/01/23 1030) Vital Signs (24h Range):  Temp:  [97.9 °F (36.6 °C)-98.9 °F (37.2 °C)] 98.2 °F (36.8 °C)  Pulse:  [] 178  Resp:  [12-34] 12  SpO2:  [78 %-100 %] 100 %  BP: ()/(0-76) 94/64  Arterial Line BP: ()/(64-93) 87/74     Weight: 96.6 kg (213 lb)  Body mass index is 26.62 kg/m².      Intake/Output Summary (Last 24 hours) at 9/1/2023 1211  Last data filed at 9/1/2023 1100  Gross per 24 hour   Intake 1283.76 ml   Output 5010 ml   Net -3726.24 ml        Physical Exam  Constitutional:       Appearance: He is ill-appearing.   HENT:      Head: Normocephalic and atraumatic.      Mouth/Throat:      Mouth: Mucous membranes are moist.      Pharynx: Oropharynx is clear.   Eyes:      Comments: Pupils pinpoint, nonreactive to light, pt sedated   Cardiovascular:      Rate and Rhythm: Tachycardia present.      Comments: Loud heart sounds with LVAD  Pulmonary:      Effort: Pulmonary effort is normal.      Breath sounds: Normal breath sounds. No wheezing, rhonchi or rales.      Comments: Vent in place  Abdominal:      General: Abdomen is flat.      Palpations: Abdomen is soft.   Musculoskeletal:      Right lower leg: No edema.      Left lower leg: No edema.   Skin:     General: Skin is warm and dry.   Neurological:      Comments: Intubated and sedated.           Review of Systems   Unable to perform ROS: Intubated       Vents:  Vent Mode: A/C (09/01/23 1145)  Ventilator Initiated: Yes (08/31/23 1224)  Set Rate: 12 BPM (09/01/23 1145)  Vt Set: 480 mL (09/01/23 1145)  PEEP/CPAP: 5 cmH20 (09/01/23 1145)  Oxygen Concentration (%): 40 (09/01/23 1145)  Peak Airway Pressure: 3.8 cmH20 (09/01/23  1145)  Plateau Pressure: 0 cmH20 (09/01/23 1145)  Total Ve: 5.53 L/m (09/01/23 1145)  Negative Inspiratory Force (cm H2O): 0 (09/01/23 1145)  F/VT Ratio<105 (RSBI): (!) 30.61 (09/01/23 0912)    Lines/Drains/Airways       Peripherally Inserted Central Catheter Line  Duration             PICC Double Lumen 08/01/23 31 days    PICC Double Lumen 08/01/23 1121 right basilic 31 days              Drain  Duration                  NG/OG Tube 08/31/23 1239 [place] sump 18 Fr. Center mouth <1 day         Urethral Catheter 08/31/23 1230 <1 day              Airway  Duration                  Airway - Non-Surgical 08/31/23 1224 Endotracheal Tube <1 day       Airway Anesthesia 08/31/23 <1 day              Arterial Line  Duration             Arterial Line 08/31/23 1239 Right Radial <1 day              Line  Duration                  VAD 06/29/22 1115 Left ventricular assist device HeartMate 3 429 days              Peripheral Intravenous Line  Duration                  Peripheral IV - Single Lumen 08/31/23 18 G Left Antecubital 1 day                    Significant Labs:    CBC/Anemia Profile:  Recent Labs   Lab 08/31/23  0536 08/31/23  1046 09/01/23  0311   WBC 11.55* 12.54 11.89   HGB 11.6* 10.1* 9.4*   HCT 36.9* 31.7* 29.7*    244 248   MCV 90.4 89 87   RDW 20.2* 19.8* 19.9*        Chemistries:  Recent Labs   Lab 08/31/23  0536 08/31/23  1046 08/31/23  1046 08/31/23  1250 08/31/23 2011 09/01/23  0311    140   < > 137 140 140   K 4.0 3.6   < > 4.0 3.4* 2.9*   CL  --  100   < > 100 100 99   CO2 29 30*   < > 26 28 31*   BUN 8.1* 8   < > 8 8 7   CREATININE 1.37* 1.1   < > 1.1 1.1 1.0   CALCIUM 8.3* 8.3*   < > 8.2* 8.5* 8.4*   ALBUMIN 3.1* 2.9*  --  2.8*  --  2.6*   PROT  --  7.9  --  7.7  --  7.3   BILITOT 0.8 0.7  --  0.7  --  0.7   ALKPHOS 114 101  --  95  --  90   ALT <5 <5*  --  <5*  --  <5*   AST 29 22  --  21  --  17   GLUCOSE 227*  --   --   --   --   --    MG  --   --   --   --  1.3* 1.7    < > = values in this  interval not displayed.       All pertinent labs within the past 24 hours have been reviewed.    Significant Imaging:  I have reviewed all pertinent imaging results/findings within the past 24 hours.

## 2023-09-01 NOTE — CARE UPDATE
Was notified that patient had self-extubated himself during an SBT trial. At the time of my examination, patient was alert and oriented x1 with marked aphasia. GCS 14. Able to follow commands but still very lethargic. Saturating well on room air. No strength deficits noted on limited neuro exam but patient became more combative as exam proceeded. Will continue to monitor for now.     Plan discussed with [facility] staff.

## 2023-09-01 NOTE — PROGRESS NOTES
Dr. Orozco called to bedside during pt SBT. With Propofol and Fentanyl gtt off patient became increasingly agitated. With MD at bedside patient became apneic on vent and he was switched back to a rate. At this time, patient was calm and appeared asleep. SBT attempted again, pt still unable to follow commands but moving all extremities and able to lift head off pillow. Then patient became acutely agitated again and began to swing L arm. With restraints still in place, patient was able to self extubate. Once ETT was removed, pt coughed up secretions and was oriented x1 with marked aphasia noted when speaking. Dr. Herndon also notified and to bedside for patient assessment. Pt hemodynamically stable at this time but refusing to wear nasal cannula. WCTM.

## 2023-09-01 NOTE — ASSESSMENT & PLAN NOTE
Procedure: Device Interrogation Including analysis of device parameters  Current Settings: Ventricular Assist Device  Review of device function is stable/unstable stable  Antiplatlet therapy w/ aspirin 81mg daily.   Anticoagulation w/ coumadin w/ goal INR 2-3. Held for now due to concern for petechial hemorrhage. Will consider restarting once repeat CT brain stable today.         9/1/2023    11:00 AM 9/1/2023    10:00 AM 9/1/2023     9:00 AM 9/1/2023     8:00 AM 9/1/2023     7:00 AM 9/1/2023     6:00 AM 9/1/2023     5:00 AM   TXP LVAD INTERROGATIONS   Type HeartMate3 HeartMate3 HeartMate3 HeartMate3 HeartMate3 HeartMate3 HeartMate3   Flow 4.2 4.2 4.2 4.1 4.2 4.4 4.4   Speed 5100 5100 5100 5100 5100 5150 5100   PI 4.4 4.5 4.4 4.6 4.3 3.8 3.43   Power (Serna) 3.5 3.5 3.5 3.5 3.5 3.5 3.5   LSL 4700 4700 4700 4700 4700 4700 4700   Pulsatility Intermittent pulse Intermittent pulse Intermittent pulse Intermittent pulse  Intermittent pulse Intermittent pulse

## 2023-09-01 NOTE — ASSESSMENT & PLAN NOTE
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 Repeated 7/29 with NGTD. Repeated again 7/30 NGTD  -PICC line replaced 8/1  -ECHO done 7/31 and no vegetations appreciated   -CT 8/1 with loculated fluid along Left lateral hear border, thoracic surgery consulted, not a candidate for VATS. Chest tube placed with IR 8/3, pleural fluid cultures positive for GPC  -Continue Ancef 8g IV q 24 hours for total of 6 weeks with end date 9/14 (6 weeks after chest tube placement).

## 2023-09-01 NOTE — PROCEDURES
ICU EEG/VIDEO MONITORING REPORT    DATE OF SERVICE: 9/1/23  EEG NUMBER: [id number]  LOCATION OF SERVICE: ICU ([id number])    METHODOLOGY   Electroencephalographic (EEG) recording is with electrodes placed according to the International 10-20 placement system.  Thirty two (32) channels of digital signal are simultaneously recorded from the scalp and may include EKG, EMG, and/or eye monitors.   Recording band pass was 0.1 to 512 hz.  Digital video recording of the patient is simultaneously recorded with the EEG.  The nursing staff report clinical symptoms and may press an event button when the patient has symptoms of clinical interest to the treating physicians.  EEG and video recording is stored and archived in digital format.  The entire recording is visually reviewed and the times identified by computer analysis as being spikes or seizures are reviewed again.  Activation procedures which include photic stimulation, hyperventilation and instructing patients to perform simple task are done in selected patients.   Compresses spectral analysis (CSA) is also performed on the activity recorded from each individual channel.  This is displayed as a power display of frequencies from 0 to 30 Hz over time.   The CSA analysis is done and displayed continuously.  This is reviewed for asymmetries in power between homologous areas of the scalp and for presence of changes in power which canbe seen when seizures occur.  Sections of suspected abnormalities on the CSA is then compared with the original EEG recording.     Stockpulse software was also utilized in the review of this study.  This software suite analyzes the EEG recording in multiple domains.  Coherence and rhythmicity is computed to identify EEG sections which may contain organized seizures.  Each channel undergoes analysis to detect presence of spike and sharp waves which have special and morphological characteristic of epileptic activity.  The routine EEG recording is  converted from spacial into frequency domain.  This is then displayed comparing homologous areas to identify areas of significant asymmetry.  Algorithm to identify non-cortically generated artifact is used to separate eye movement, EMG and other artifact from the EEG.      Recording Times  Start on 8/31/23 at 16:02  Stop on 9/1/23 at 07:00  A total of 14 hours and 55 minutes of EEG was recorded.    This EEG study is performed in the ICU with the patient on the following sedative medications: propofol @ 50 mcg/kg/min    Indication: 39 y/o AAM w/ PMH of NICM (possible Familial w/ father having LVAD and subsequent transplant) s/p DT-HM3 (6/3/2022), HFrEF, chronic DLES, MSSA bacteremia c/b L empyema (maintained on ancef, maria isabel 9/14), seizures, and IDDM2 who presented with R hemiparesis and aphasia, found to have L hemispheric infarction.  Patient subsequently had seizure like activity; eeg to evaluate for seizures.     State of Consciousness:   Stupor    Background:   The background is poorly organized, asymmetric, and continuous.    For the majority of the record, it is composed primarily of intermixed beta activity resembling sleep spindles as well as underlying delta activity. The left hemisphere has slightly more prominent delta range slowing compared to the right.  There is only a brief period of arousal seen.  During this period, the right hemisphere displays mainly theta activity with a rudimentary 6-7 hz PDR, which is not well appreciated in the left hemisphere.            Sleep:   Patient is in a sedated state throughout the majority of the record as detailed above.    Epileptiform Abnormalities  None       EKG:   Irregular    Events:   No push button activations are noted.     Impression:   This is an abnormal primarily sleep/sedated EEG showed a moderate to severe encephalopathy and a left hemispheric structural lesion.  No epileptiform discharges, lateralizing features, or clinical events occur during this  portion of the record.     Asiya Sharp MD  [facility]   Department of Neurology/Epilepsy

## 2023-09-01 NOTE — PROGRESS NOTES
[facility] - Surgical Intensive Care  Heart Transplant  Progress Note    Patient Name: Kevan Queen  MRN: [id number]  Admission Date: 8/31/2023  Hospital Length of Stay: 1 days  Attending Physician: Luca Lopez Jr.*  Primary Care Provider: Rosio Armendariz FNP  Principal Problem:Embolic stroke involving left middle cerebral artery    Subjective:     Interval History: No acute events overnight. At the time of examination patient remained intubated and sedated.     Continuous Infusions:   fentanyl 50 mcg/hr (09/01/23 1000)    milrinone 20mg/100ml D5W (200mcg/ml) 0.25 mcg/kg/min (09/01/23 1100)    nicardipine Stopped (08/31/23 1243)    propofoL 50 mcg/kg/min (09/01/23 1100)     Scheduled Meds:   aspirin  81 mg Oral Daily    atorvastatin  40 mg Oral Daily    ceFAZolin (ANCEF) 8 g in dextrose 5 % (D5W) 500 mL CONTINUOUS INFUSION  8 g Intravenous Q24H    furosemide (LASIX) injection  80 mg Intravenous Q8H    insulin detemir U-100  9 Units Subcutaneous BID    levETIRAcetam (Keppra) IV (PEDS and ADULTS)  1,000 mg Intravenous Q12H    mupirocin   Nasal BID    pantoprazole  40 mg Intravenous Daily    polyethylene glycol  17 g Oral Daily    senna  8.6 mg Oral Daily     PRN Meds:dextrose 10%, dextrose 10%, fentaNYL, glucagon (human recombinant), insulin aspart U-100, sodium chloride 0.9%    Review of patient's allergies indicates:   Allergen Reactions    Bumex [bumetanide] Hives    Torsemide Hives     Objective:     Vital Signs (Most Recent):  Temp: 98.2 °F (36.8 °C) (09/01/23 0300)  Pulse: (!) 178 (09/01/23 1030)  Resp: 12 (09/01/23 1030)  BP: 94/64 (09/01/23 0326)  SpO2: 100 % (09/01/23 1030) Vital Signs (24h Range):  Temp:  [97.9 °F (36.6 °C)-99.7 °F (37.6 °C)] 98.2 °F (36.8 °C)  Pulse:  [] 178  Resp:  [12-39] 12  SpO2:  [78 %-100 %] 100 %  BP: ()/(0-92) 94/64  Arterial Line BP: ()/(64-93) 87/74     Patient Vitals for the past 72 hrs (Last 3 readings):   Weight   09/01/23 0305 96.6 kg  (213 lb)   08/31/23 1017 95.5 kg (210 lb 8.6 oz)     Body mass index is 26.62 kg/m².      Intake/Output Summary (Last 24 hours) at 9/1/2023 1135  Last data filed at 9/1/2023 1100  Gross per 24 hour   Intake 1283.76 ml   Output 5010 ml   Net -3726.24 ml       Hemodynamic Parameters:  CVP:  [12 mmHg-16 mmHg] 12 mmHg           Physical Exam  Constitutional:       Appearance: Normal appearance.      Comments: Intubated and sedated   HENT:      Head: Normocephalic and atraumatic.   Eyes:      Conjunctiva/sclera: Conjunctivae normal.   Neck:      Vascular: JVD present.      Comments: JVP 13cm of H20 at 45 degrees.   Cardiovascular:      Comments: VAD hum appreciated  Pulmonary:      Breath sounds: Normal breath sounds.      Comments: Clear to auscultation  Abdominal:      Comments: Soft, non-tender, non-distended   Musculoskeletal:      Cervical back: Normal range of motion.      Comments: No peripheral edema   Skin:     General: Skin is warm and dry.      Capillary Refill: Capillary refill takes less than 2 seconds.   Neurological:      Comments: Sedated            Significant Labs:  CBC:  Recent Labs   Lab 08/31/23  0536 08/31/23  1046 09/01/23  0311   WBC 11.55* 12.54 11.89   RBC 4.08* 3.56* 3.40*   HGB 11.6* 10.1* 9.4*   HCT 36.9* 31.7* 29.7*    244 248   MCV 90.4 89 87   MCH 28.4 28.4 27.6   MCHC 31.4* 31.9* 31.6*     BNP:  Recent Labs   Lab 08/31/23  1046   *     CMP:  Recent Labs   Lab 08/31/23  1046 08/31/23  1250 08/31/23 2011 09/01/23  0311   * 198* 142* 113*   CALCIUM 8.3* 8.2* 8.5* 8.4*   ALBUMIN 2.9* 2.8*  --  2.6*   PROT 7.9 7.7  --  7.3    137 140 140   K 3.6 4.0 3.4* 2.9*   CO2 30* 26 28 31*    100 100 99   BUN 8 8 8 7   CREATININE 1.1 1.1 1.1 1.0   ALKPHOS 101 95  --  90   ALT <5* <5*  --  <5*   AST 22 21  --  17   BILITOT 0.7 0.7  --  0.7      Coagulation:   Recent Labs   Lab 08/31/23  0536 08/31/23  1046 09/01/23  0311   INR 1.2 1.3* 1.3*   APTT  --   --  25.9      LDH:  Recent Labs   Lab 09/01/23  0311   *     Microbiology:  Microbiology Results (last 7 days)       ** No results found for the last 168 hours. **            I have reviewed all pertinent labs within the past 24 hours.    Estimated Creatinine Clearance: 119.7 mL/min (based on SCr of 1 mg/dL).    Diagnostic Results:  I have reviewed all pertinent imaging results/findings within the past 24 hours.    Assessment and Plan:     Mr. 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 He was reportedly not taking his coumadin or keppra for the past week due to running out. As per chart review, patient started to experience symptoms around 1am when he went to sleep. Symptoms worsened and EMS was called around 3:30am. Upon admission patient underwent a CT head showed an acute to subacute infarct with possible petechial hemorrhage. Labs were significant for INR of 1.2. Patient was loaded with Keppra and [place] [facility]      Upon arrival, patient was found to be hemodynamically stable w/ MAPs of 106. However, he was noted to be not alert or oriented and extremely lethargic. Patient was noted to have GCS 10-11 with minimally reactive pupils. Patient underwent stat CT brain which showed no acute changes since prior CT, and transferred to the ICU for closer monitoring. Once in the ICU patient was intubated for airway protection.      Of note, patient was most recently hospitalized from 7/22/2023-8/8/2023 for sepsis/covod. Found to have loculated pleural effussion requiring chest tube placement and three doses of lytics. Effussion improved and patient was discharged with 6 week course of Ancef (to end on 9/14).        Home  Medications:    Aspirin 81mg daily   Buspirone 10mg bid   Furosemide 80mg daily   Insulin detemir 25 units bid   Insulin aspart 14 units w/ meals   Milrinone 0.25   Mirtazapine 15mg nightly   Valsartan 40mg bi   Warfarin      Cardiac Imaging:    Echo (7/31/2023): HM3 at 5100. AV does not open. IV septum midline. EF 10-15%. LV severely dilated. Normal wall thickness. Severe global hypokinesis. RV not well visualized due to poor acoustic window. Biatrial enlargment. Mod TR.        * Embolic stroke involving left middle cerebral artery  - CTH with L parietal hypodensity with subtle areas of hyperdensity concerning for acute/subacute infarct with petechial hemorrhage, CTA negative for LVO or critical stenosis. Determined not a candidate for acute interventions with thrombolytics/thrombectomy.  - As per vascular neuro, continue aspirin and statin. Repeat CT brain pending today to decided whether to restart anticoagulation. Permissive HTN.   - PT, OT, Speech consulted.     Acute on chronic combined systolic and diastolic heart failure  - Last Echo (7/31/2023): HM3 at 5100. AV does not open. IV septum midline. EF 10-15%. LV severely dilated. Normal wall thickness. Severe global hypokinesis. RV not well visualized due to poor acoustic window. Biatrial enlargment. Mod TR.    - Home GDMT: valsartan 40mg bid  - Home diuretic regimen: Lasix 80mg daily.   - Hypervolemic on exam today. CVP 10 from the PICC line. Continue IV lasix 80mg tid and will adjust accordingly based on repsonse.   - Ionotropes: continue home milrinone 0.25.   - Strict I&Os and daily weights.   - Maintain K>4 and Mg>2    Seizure-like activity  - loaded with keppra in ED prior to arrival.   - On propofol as sedation for mechanical ventilation.   - EEG unremarkable. As per neuro will restart keppra 1000mg bid.   - Neurology consulted. Appreciate recommendations.     LVAD (left ventricular assist device) present  Procedure: Device Interrogation Including  analysis of device parameters  Current Settings: Ventricular Assist Device  Review of device function is stable/unstable stable  Antiplatlet therapy w/ aspirin 81mg daily.   Anticoagulation w/ coumadin w/ goal INR 2-3. Held for now due to concern for petechial hemorrhage. Will consider restarting once repeat CT brain stable today.         9/1/2023    11:00 AM 9/1/2023    10:00 AM 9/1/2023     9:00 AM 9/1/2023     8:00 AM 9/1/2023     7:00 AM 9/1/2023     6:00 AM 9/1/2023     5:00 AM   TXP LVAD INTERROGATIONS   Type HeartMate3 HeartMate3 HeartMate3 HeartMate3 HeartMate3 HeartMate3 HeartMate3   Flow 4.2 4.2 4.2 4.1 4.2 4.4 4.4   Speed 5100 5100 5100 5100 5100 5150 5100   PI 4.4 4.5 4.4 4.6 4.3 3.8 3.43   Power (Serna) 3.5 3.5 3.5 3.5 3.5 3.5 3.5   LSL 4700 4700 4700 4700 4700 4700 4700   Pulsatility Intermittent pulse Intermittent pulse Intermittent pulse Intermittent pulse  Intermittent pulse Intermittent pulse       On mechanically assisted ventilation  Mechanically ventillatied for airway protection in the setting of AMS w/ stroke.     - Pulmonology consulted to assist with management.     Type 2 diabetes mellitus with hyperglycemia  - management as per endocrinology.         Chantal Herndon MD  Heart Transplant  [facility] - Surgical Intensive [facility]

## 2023-09-01 NOTE — ASSESSMENT & PLAN NOTE
-NICM s/p LVAD and on Milrinone   - Last Echo (7/31/2023): HM3 at 5100. AV does not open. IV septum midline. EF 10-15%. LV severely dilated. Normal wall thickness. Severe global hypokinesis. RV not well visualized due to poor acoustic window. Biatrial enlargment. Mod TR.    -diuresed on IV Lasix with good response.  He is net negative 6.8L since admission.  CVP: 6 and he had LFA this am.  Will stop IV Lasix and monitor response.   -Will get echo repeat echo given LFA  - Home GDMT: valsartan 40mg bid  - Home diuretic regimen: Lasix 80mg daily.   - Ionotropes: continue home milrinone 0.25.   - Strict I&Os and daily weights.   - Maintain K>4 and Mg>2

## 2023-09-01 NOTE — ASSESSMENT & PLAN NOTE
Kevan Queen is a 38 y.o. male with pertinent medical history of DM, CHF, cardiomyopathy s/p LVAD and ICD, drug abuse, medication noncompliance, and seizures that was transferred from [place] with acute/subacute L MCA stroke for higher level care. Initially presented to with aphasia and RSW, LKN approx 8 hrs PTA. Patient's significant other also reported episode of seizure-like activity, described as rhythmic shaking. Of note patient reportedly had been out of coumadin x 5 days, which was finally refilled 8/30 and was given 2 pills to take that night. In OSH ED, documented to have seizure-like activity with rhythmic shaking of BUE. CTH with L parietal hypodensity with subtle areas of hyperdensity concerning for acute/subacute infarct with petechial hemorrhage, CTA negative for LVO or critical stenosis. Determined not a candidate for acute interventions with thrombolytics/thrombectomy.    Stroke code was activated [place] [facility] due to AMS and for expedited transfer to ICU for concerns of airway protection. Stat CTA stable compared to prior study. He was transferred to SICU and intubated for airway protection. Exam this afternoon limited due to intubation/sedation, patient not moving RUE to pain with triple flexion to all other extremities but corneal/cough/gag intact.    Recommendations:    Antithrombotics for secondary stroke prevention: Antiplatelets: Aspirin: 81 mg daily ok to continue at this time;    repeat CTH 9/1: area of petechial hemorrhage stable, OK to start heparin gtt for AC     Statins for secondary stroke prevention and HLD, if present: Statins: Atorvastatin- 40 mg daily    Aggressive risk factor modification: DM, Diet, Exercise, Obesity     Rehab efforts: The patient has been evaluated by a stroke team provider and the therapy needs have been fully considered based off the presenting complaints and exam findings. The following therapy evaluations are needed: PT evaluate and treat, OT evaluate and treat,  SLP evaluate and treat pending extubated    Diagnostics ordered/pending: CT scan of head without contrast to asses brain parenchyma, HgbA1C to assess blood glucose levels, Lipid Profile to assess cholesterol levels, TTE to assess cardiac function/status     VTE prophylaxis: Mechanical prophylaxis: Place SCDs  None: Reason for No Pharmacological VTE Prophylaxis: concern for petechial hemorrahge on CTH    BP parameters: Infarct: No intervention, SBP <220

## 2023-09-01 NOTE — PROGRESS NOTES
[facility] - [facility] Intensive Care  Vascular Neurology  Comprehensive Stroke Center  Progress Note    Assessment/Plan:     * Embolic stroke involving left middle cerebral artery  Kevan Queen is a 38 y.o. male with pertinent medical history of DM, CHF, cardiomyopathy s/p LVAD and ICD, drug abuse, medication noncompliance, and seizures that was transferred from [place] with acute/subacute L MCA stroke for higher level care. Initially presented to with aphasia and RSW, LKN approx 8 hrs PTA. Patient's significant other also reported episode of seizure-like activity, described as rhythmic shaking. Of note patient reportedly had been out of coumadin x 5 days, which was finally refilled 8/30 and was given 2 pills to take that night. In OSH ED, documented to have seizure-like activity with rhythmic shaking of BUE. CTH with L parietal hypodensity with subtle areas of hyperdensity concerning for acute/subacute infarct with petechial hemorrhage, CTA negative for LVO or critical stenosis. Determined not a candidate for acute interventions with thrombolytics/thrombectomy.    [place] [facility] [place] and for expedited transfer to ICU for concerns of airway protection. Stat CTA stable compared to prior study. He was transferred to SICU and intubated for airway protection. Exam this afternoon limited due to intubation/sedation, patient not moving RUE to pain with triple flexion to all other extremities but corneal/cough/gag intact.    Recommendations:    Antithrombotics for secondary stroke prevention: Antiplatelets: Aspirin: 81 mg daily ok to continue at this time;    repeat CTH 9/1: area of petechial hemorrhage stable, OK to start heparin gtt for AC     Statins for secondary stroke prevention and HLD, if present: Statins: Atorvastatin- 40 mg daily    Aggressive risk factor modification: DM, Diet, Exercise, Obesity     Rehab efforts: The patient has been evaluated by a stroke team provider and the therapy  needs have been fully considered based off the presenting complaints and exam findings. The following therapy evaluations are needed: PT evaluate and treat, OT evaluate and treat, SLP evaluate and treat pending extubated    Diagnostics ordered/pending: CT scan of head without contrast to asses brain parenchyma, HgbA1C to assess blood glucose levels, Lipid Profile to assess cholesterol levels, TTE to assess cardiac function/status     VTE prophylaxis: Mechanical prophylaxis: Place SCDs  None: Reason for No Pharmacological VTE Prophylaxis: concern for petechial hemorrahge on CTH    BP parameters: Infarct: No intervention, SBP <220        History of substance abuse  Stroke risk factor  Recommend obtaining UTox    on cessation when appropriate    Seizure-like activity  Reported episode of seizure like activity at OSH with BUE rhythmic shaking  Per chart, currently on Keppra 1g BID at home for seizure ppx    --General neurology consulted for seizure/AED recs  --Recommend EEG to further evaluate for seizures    LVAD (left ventricular assist device) present  Stroke risk factor  On AC with coumadin, reportedly had been out of meds x 5 days prior to presentation  Hold AC due to concern for petechial hemorrhage on CT, recommend repeat CTH tomorrow (9/1) and if stable without new/worsening hemorrhage then ok to resume AC    Type 2 diabetes mellitus with hyperglycemia  Stroke risk factor  A1c pending  BG goal while inpatient 140-180  Insulin per primary team, currently on levemir 10 units BID + SSI PRN    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  Stroke risk factor   on cessation when appropriate  Consider nicotine patch PRN and referral at discharge for smoking cessation    Acute on chronic combined systolic and diastolic heart failure  Stroke risk factor  S/p LVAD and ICD  Management per primary team/HTS         8/31/23 -   9/1/23 - Intubated/sedated, Neuro exam unchanged, CTH no new or expanding bleed. Sedation  off in PM      STROKE DOCUMENTATION   Acute Stroke Times   Last Known Normal Date: 08/31/23  Last Known Normal Time: 1050  Symptom Onset Date: 08/31/23  Symptom Onset Time: 1100  Stroke Team Called Date: 08/31/23  Stroke Team Called Time: 1113  CT Interpretation Time: 1145  Thrombolytic Therapy Recommended: No    NIH Scale:  1a. Level of Consciousness: 2-->Not alert, requires repeated stimulation to attend, or is obtunded and requires strong or painful stimulation to make movements (not stereotyped)  1b. LOC Questions: 2-->Answers neither question correctly  1c. LOC Commands: 2-->Performs neither task correctly  2. Best Gaze: 0-->Normal  3. Visual: 0-->No visual loss  4. Facial Palsy: 0-->Normal symmetrical movements  5a. Motor Arm, Left: 3-->No effort against gravity, limb falls  5b. Motor Arm, Right: 4-->No movement  6a. Motor Leg, Left: 3-->No effort against gravity, leg falls to bed immediately  6b. Motor Leg, Right: 3-->No effort against gravity, leg falls to bed immediately  7. Limb Ataxia: 0-->Absent  8. Sensory: 1-->Mild-to-moderate sensory loss, patient feels pinprick is less sharp or is dull on the affected side, or there is a loss of superficial pain with pinprick, but patient is aware of being touched  9. Best Language: 3-->Mute, global aphasia, no usable speech or auditory comprehension  10. Dysarthria: (UN) Intubated or other physical barrier  11. Extinction and Inattention (formerly Neglect): 0-->No abnormality  Total (NIH Stroke Scale): 23       Modified Huma    Candie Coma Scale:    ABCD2 Score:    [id number] Score:   HAS -BLED Score:   ICH Score:   Hunt & Arce Classification:      Hemorrhagic change of an Ischemic Stroke: Does this patient have an ischemic stroke with hemorrhagic changes? No         Past Medical History:   Diagnosis Date    Acute respiratory failure with hypoxia 7/22/2023    Arthritis     Awareness alteration, transient 9/1/2022    Cardiomyopathy     CHF (congestive heart  failure) 10/01/2020    COVID-19 6/3/2023    Diabetes mellitus     Dilated cardiomyopathy 10/26/2020    Drug abuse 10/2020    Headache 4/19/2023    Hyperglycemia 12/16/2022    Hyperosmolar hyperglycemic state (HHS) 5/25/2022    ICD (implantable cardioverter-defibrillator) in place 10/26/2020    Left ventricular assist device (LVAD) complication, initial encounter 6/5/2023    Muscle cramping 6/15/2022    Renal disorder     SOB (shortness of breath) 6/13/2022    Tingling in extremities 7/13/2022     Past Surgical History:   Procedure Laterality Date    APPLICATION OF WOUND VACUUM-ASSISTED CLOSURE DEVICE N/A 6/30/2022    Procedure: APPLICATION, WOUND VAC;  Surgeon: Luis F Paige MD;  Location: [facility] OR 2ND FLR;  Service: Cardiovascular;  Laterality: N/A;  50 x 5 cm    CARDIAC DEFIBRILLATOR PLACEMENT      IMPLANTATION OF RIGHT VENTRICULAR ASSIST DEVICE (RVAD) N/A 6/29/2022    Procedure: INSERTION, RVAD;  Surgeon: Luis F Paige MD;  Location: [facility] OR 2ND FLR;  Service: Cardiovascular;  Laterality: N/A;    INSERTION OF GRAFT TO PERICARDIUM Right 6/30/2022    Procedure: INSERTION-RIGHT VENTRICULAR ASSIST DEVICE;  Surgeon: Luis F Paige MD;  Location: [facility] OR 2ND FLR;  Service: Cardiovascular;  Laterality: Right;    IRRIGATION OF MEDIASTINUM  6/30/2022    Procedure: IRRIGATION, MEDIASTINUM;  Surgeon: Luis F Paige MD;  Location: [facility] OR 2ND FLR;  Service: Cardiovascular;;    LEFT VENTRICULAR ASSIST DEVICE Left 6/23/2022    Procedure: INSERTION-LEFT VENTRICULAR ASSIST DEVICE;  Surgeon: Luis F Paige MD;  Location: [facility] OR 2ND FLR;  Service: Cardiovascular;  Laterality: Left;    LEFT VENTRICULAR ASSIST DEVICE N/A 6/29/2022    Procedure: INSERTION-LEFT VENTRICULAR ASSIST DEVICE;  Surgeon: Luis F Paige MD;  Location: [facility] OR 2ND FLR;  Service: Cardiovascular;  Laterality: N/A;    RIGHT HEART CATHETERIZATION Right 4/8/2022    Procedure: INSERTION, CATHETER, RIGHT HEART;  Surgeon: Luca Lopez  MD Jes;  Location: [facility] CATH LAB;  Service: Cardiology;  Laterality: Right;    RIGHT HEART CATHETERIZATION Right 2022    Procedure: INSERTION, CATHETER, RIGHT HEART;  Surgeon: Josh Pulido MD;  Location: [facility] CATH LAB;  Service: Cardiology;  Laterality: Right;    RIGHT HEART CATHETERIZATION Right 2022    Procedure: INSERTION, CATHETER, RIGHT HEART;  Surgeon: Dalia Crum MD;  Location: [facility] CATH LAB;  Service: Cardiology;  Laterality: Right;    RIGHT HEART CATHETERIZATION Right 10/31/2022    Procedure: INSERTION, CATHETER, RIGHT HEART;  Surgeon: Dalia Crum MD;  Location: [facility] CATH LAB;  Service: Cardiology;  Laterality: Right;    STERNAL WOUND CLOSURE N/A 2022    Procedure: CLOSURE, WOUND, STERNUM;  Surgeon: Luis F Paige MD;  Location: [facility] OR [facility] FLR;  Service: Cardiovascular;  Laterality: N/A;     Family History   Problem Relation Age of Onset    Heart failure Father     Diverticulosis Brother     Heart attack Maternal Grandmother     Heart attack Maternal Grandfather      Social History     Tobacco Use    Smoking status: Former     Current packs/day: 0.00     Average packs/day: 0.5 packs/day for 16.6 years (8.3 ttl pk-yrs)     Types: Cigarettes     Start date: 10/1/2004     Quit date: 2021     Years since quittin.3    Smokeless tobacco: Never   Substance Use Topics    Alcohol use: Not Currently    Drug use: Not Currently     Types: Marijuana, MDMA (Ecstacy)     Review of patient's allergies indicates:   Allergen Reactions    Bumex [bumetanide] Hives    Torsemide Hives       Medications: I have reviewed the current medication administration record.    Medications Prior to Admission   Medication Sig Dispense Refill Last Dose    aspirin (ECOTRIN) 81 MG EC tablet Take 1 tablet (81 mg total) by mouth once daily. 90 tablet 3     blood sugar diagnostic Strp Use to test blood glucose 4 (four) times daily. 200 each 5     blood-glucose meter Misc Use as  "instructed 1 each 0     busPIRone (BUSPAR) 10 MG tablet Take 10 mg by mouth 2 (two) times daily.       dextrose 5 % (D5W) SolP 500 mL with ceFAZolin 1 gram SolR 8 g per day IV continuously       furosemide (LASIX) 80 MG tablet Take 1 tablet (80 mg total) by mouth once daily. 30 tablet 11     insulin aspart U-100 (NOVOLOG) 100 unit/mL (3 mL) InPn pen Inject 14 Units into the skin 3 (three) times daily with meals. Plus Sliding Scale: 151-200: +1, 201-250: +2, 251-300: +3, 301-350: +4 and call MD 18 mL 5     insulin detemir U-100, Levemir, 100 unit/mL (3 mL) SubQ InPn pen Inject 24 Units into the skin 2 (two) times daily. (Patient not taking: Reported on 8/9/2023) 15 mL 5     lancets 33 gauge Misc Use to test blood glucose 4 (four) times daily. 200 each 3     levETIRAcetam (KEPPRA) 1000 MG tablet Take 1 tablet (1,000 mg total) by mouth 2 (two) times daily. 60 tablet 11     milrinone 20mg/100ml D5W, 200mcg/ml, (PRIMACOR) 20 mg/100 mL (200 mcg/mL) infusion Inject 34.875 mcg/min into the vein continuous.       mirtazapine (REMERON) 15 MG tablet Take 1 tablet (15 mg total) by mouth nightly. 30 tablet 11     pen needle, diabetic 32 gauge x 5/32" Ndle Use to inject insulin 5 (five) times daily. 200 each 5     valsartan (DIOVAN) 40 MG tablet Take 1 tablet (40 mg total) by mouth 2 (two) times daily. 180 tablet 3     warfarin (COUMADIN) 3 MG tablet Take 1.5 tablets (4.5mg) orally daily, except 2 tablets (6mg) on Wednesdays and Saturdays 60 tablet 5        Review of Systems   Unable to perform ROS: Intubated     Objective:     Vital Signs (Most Recent):  Temp: 98.2 °F (36.8 °C) (09/01/23 0300)  Pulse: 86 (09/01/23 1227)  Resp: 12 (09/01/23 1227)  BP: 94/64 (09/01/23 0326)  SpO2: 100 % (09/01/23 1030)    Vital Signs Range (Last 24H):  Temp:  [97.9 °F (36.6 °C)-98.9 °F (37.2 °C)]   Pulse:  []   Resp:  [12-25]   BP: ()/(0-76)   SpO2:  [78 %-100 %]   Arterial Line BP: (76-99)/(64-87)       Physical " Exam  Vitals and nursing note reviewed.   Constitutional:       Appearance: He is ill-appearing.      Interventions: He is intubated.   HENT:      Head: Normocephalic.      Nose: Nose normal.   Eyes:      Extraocular Movements: Extraocular movements intact.      Conjunctiva/sclera: Conjunctivae normal.   Cardiovascular:      Rate and Rhythm: Normal rate.   Pulmonary:      Effort: He is intubated.   Abdominal:      General: There is no distension.   Musculoskeletal:         General: No deformity.   Skin:     General: Skin is warm.   Neurological:      Cranial Nerves: No dysarthria or facial asymmetry.      Sensory: Sensory deficit present.      Motor: Weakness present.      Comments: Exam limited due to intubation/sedation. Does not follow commands. Grimaces to pain in RUE but no movement, triple flexion to pain in LUE and BLE. + corneals/cough/gag              Neurological Exam:   LOC: intubated/sedated  Attention Span: poor  Language: Intubated  Articulation: Untestable due to intubation  Orientation: Untestable due to intubation  Pupils (CN II, III): PERRL  Gag Reflex: present  Motor: Arm left  Paresis: 1/5  Leg left  Paresis: 1/5  Arm right  Plegia 0/5  Leg right Paresis: 1/5  Sensation: Paul-hypoesthesia right      Laboratory:  CMP:   Recent Labs   Lab 09/01/23  0311   CALCIUM 8.4*   ALBUMIN 2.6*   PROT 7.3      K 2.9*   CO2 31*   CL 99   BUN 7   CREATININE 1.0   ALKPHOS 90   ALT <5*   AST 17   BILITOT 0.7       CBC:   Recent Labs   Lab 09/01/23  0311   WBC 11.89   RBC 3.40*   HGB 9.4*   HCT 29.7*      MCV 87   MCH 27.6   MCHC 31.6*       Lipid Panel:   Recent Labs   Lab 08/31/23  1634   CHOL 118*   LDLCALC 63.2   HDL 37*   TRIG 89       Coagulation:   Recent Labs   Lab 09/01/23  0311   INR 1.3*   APTT 25.9       Hgb A1C:   Recent Labs   Lab 08/31/23  1634   HGBA1C 6.9*       TSH:   Recent Labs   Lab 08/31/23  1634   TSH 0.869         Diagnostic Results:    Brain/Vessel imaging:  CTA stroke  multiphase 8/31/2023 1145  Impression:   Left parietal edema, nonspecific.  It is unclear whether this represents a recent infarct, focus of edema from demyelination or encephalitis, or other underlying lesion.  No midline shift or herniation.  Questionable very mild adjacent petechial hemorrhage with no focal hematoma.   No significant stenosis at the carotid bifurcations by NASCET criteria the vertebral arteries are patent.  No evidence of dissection.   No major branch stenosis/occlusion at the [id number].  No evidence of venous thrombosis.    CTA stroke multiphase 8/31/2023 0504 @ OSH   Impression:  No large vessel occlusion or flow-limiting stenosis.  No significant change from the [facility] interpretation.    CTH without contrast 8/31/2023 0453 @ OSH  Impression:  Findings suspicious for acute to subacute left parietal infarct with possible petechial hemorrhage.  No significant discrepancy with the preliminary report.    CTH without 9/1/2023   Stable edema in the left parietal lobe.  Again it is unclear whether this reflects a recent infarct or infectious/inflammatory process including cerebritis.         Cardiac Evaluation:   TTE 7/31/2023    LVAD: There is a Heartmate III LVAD running at 5100 RPM. The aortic valve does not open. The ventricular septum is at midline.    Left Ventricle: The left ventricle is severely dilated. Normal wall thickness. Severe global hypokinesis present. There is severely reduced systolic function with a visually estimated ejection fraction of 10 -15%. There is diastolic dysfunction.    Right Ventricle: Right ventricle was not well visualized due to poor acoustic window.    Biatrial enlargement.    Tricuspid Valve: There is moderate transvalvular regurgitation.    Estimated PASP is 33 mmHg.    IVC/SVC: Normal venous pressure at 3 mmHg.        Mando Valencia MD  [facility]  Department of Vascular Neurology   [facility] - Surgical Intensive Care

## 2023-09-01 NOTE — ASSESSMENT & PLAN NOTE
- loaded with keppra in ED prior to arrival.   - EEG unremarkable, but remained on overnight. Will continue keppra 1000mg bid as per Neuro recs   - Neurology consulted. Appreciate recommendations.

## 2023-09-01 NOTE — SUBJECTIVE & OBJECTIVE
"Interval HPI:   Overnight events: NAEON. Remains in SICU. BG well controlled and within goal ranges on current SQ insulin regimen. Diet NPO    Eating:   NPO  Nausea: No  Hypoglycemia and intervention: No  Fever: No  TPN and/or TF: Yes  If yes, type of TF/TPN and rate: [place] Peptide 1.5 at 20 ml/hr (goal rate of 45 ml/hr)     BP 94/64   Pulse 98   Temp 98.2 °F (36.8 °C) (Oral)   Resp 15   Ht 6' 3" (1.905 m)   Wt 96.6 kg (213 lb)   SpO2 100%   BMI 26.62 kg/m²     Labs Reviewed and Include    Recent Labs   Lab 09/01/23  0311   *   CALCIUM 8.4*   ALBUMIN 2.6*   PROT 7.3      K 2.9*   CO2 31*   CL 99   BUN 7   CREATININE 1.0   ALKPHOS 90   ALT <5*   AST 17   BILITOT 0.7     Lab Results   Component Value Date    WBC 11.89 09/01/2023    HGB 9.4 (L) 09/01/2023    HCT 29.7 (L) 09/01/2023    MCV 87 09/01/2023     09/01/2023     Recent Labs   Lab 08/31/23  0536 08/31/23  1634   TSH 4.954* 0.869     Lab Results   Component Value Date    HGBA1C 6.9 (H) 08/31/2023       Nutritional status:   Body mass index is 26.62 kg/m².  Lab Results   Component Value Date    ALBUMIN 2.6 (L) 09/01/2023    ALBUMIN 2.8 (L) 08/31/2023    ALBUMIN 2.9 (L) 08/31/2023     Lab Results   Component Value Date    PREALBUMIN 15 (L) 08/07/2023    PREALBUMIN 21 08/04/2023    PREALBUMIN 29 08/02/2023       Estimated Creatinine Clearance: 119.7 mL/min (based on SCr of 1 mg/dL).    Accu-Checks  Recent Labs     08/31/23  1037 08/31/23  1254 08/31/23  1633 08/31/23  2020 09/01/23  0323   POCTGLUCOSE 208* 199* 180* 135* 123*       Current Medications and/or Treatments Impacting Glycemic Control  Immunotherapy:    Immunosuppressants       None          Steroids:   Hormones (From admission, onward)      None          Pressors:    Autonomic Drugs (From admission, onward)      None          Hyperglycemia/Diabetes Medications:   Antihyperglycemics (From admission, onward)      Start     Stop Route Frequency Ordered    08/31/23 2100  " insulin detemir U-100 (Levemir) pen 9 Units         -- SubQ 2 times daily 08/31/23 1550    08/31/23 1647  insulin aspart U-100 pen 0-10 Units         -- SubQ Every 4 hours PRN 08/31/23 1550

## 2023-09-01 NOTE — ASSESSMENT & PLAN NOTE
- loaded with keppra in ED prior to arrival.   - On propofol as sedation for mechanical ventilation.   - EEG unremarkable. As per neuro will restart keppra 1000mg bid.   - Neurology consulted. Appreciate recommendations.

## 2023-09-01 NOTE — PROGRESS NOTES
"[facility] - [facility] Intensive Care  Endocrinology  Progress Note    Admit Date: 8/31/2023     Reason for Consult: Management of T2DM, Hyperglycemia      Surgical Procedure and Date: LVAD 06/29/2022     Diabetes diagnosis year: 2022     Home Diabetes Medications:   -Levemir 22 units BID.   -Novolog 16 units TIDWM in addition to the following correction scale:     150 - 200 + 1 unit     201 - 250 + 2 units     251 - 300 + 3 units     301 - 350 + 4 units      > 350   + 5 units     How often checking glucose at home?  Uses Freestyle William    BG readings on regimen: 92-180s  Hypoglycemia on the regimen?  No  Missed doses on regimen?  occasionally skips mealsn and will skip Novolog with breakfast      Diabetes Complications include:     Hyperglycemia     Complicating diabetes co morbidities:   History of MI, CHF, and CKD        HPI: 38 year old male with a past medical history of CHF, DM, drug abuse, medication noncompliance, cardiomyopathy s/p LVAD, and seizures presented to ED on 8/31 for aphasia and right sided weakness. Hemorraghic stroke noted. Endocrine consulted to managed hyperglycemia and type 2 diabetes.       Interval HPI:   Overnight events: NAEON. Remains in SICU. BG well controlled and within goal ranges on current SQ insulin regimen. Diet NPO    Eating:   NPO  Nausea: No  Hypoglycemia and intervention: No  Fever: No  TPN and/or TF: Yes  If yes, type of TF/TPN and rate: [place] Peptide 1.5 at 20 ml/hr (goal rate of 45 ml/hr)     BP 94/64   Pulse 98   Temp 98.2 °F (36.8 °C) (Oral)   Resp 15   Ht 6' 3" (1.905 m)   Wt 96.6 kg (213 lb)   SpO2 100%   BMI 26.62 kg/m²     Labs Reviewed and Include    Recent Labs   Lab 09/01/23  0311   *   CALCIUM 8.4*   ALBUMIN 2.6*   PROT 7.3      K 2.9*   CO2 31*   CL 99   BUN 7   CREATININE 1.0   ALKPHOS 90   ALT <5*   AST 17   BILITOT 0.7     Lab Results   Component Value Date    WBC 11.89 09/01/2023    HGB 9.4 (L) 09/01/2023    HCT 29.7 (L) 09/01/2023    " MCV 87 09/01/2023     09/01/2023     Recent Labs   Lab 08/31/23  0536 08/31/23  1634   TSH 4.954* 0.869     Lab Results   Component Value Date    HGBA1C 6.9 (H) 08/31/2023       Nutritional status:   Body mass index is 26.62 kg/m².  Lab Results   Component Value Date    ALBUMIN 2.6 (L) 09/01/2023    ALBUMIN 2.8 (L) 08/31/2023    ALBUMIN 2.9 (L) 08/31/2023     Lab Results   Component Value Date    PREALBUMIN 15 (L) 08/07/2023    PREALBUMIN 21 08/04/2023    PREALBUMIN 29 08/02/2023       Estimated Creatinine Clearance: 119.7 mL/min (based on SCr of 1 mg/dL).    Accu-Checks  Recent Labs     08/31/23  1037 08/31/23  1254 08/31/23  1633 08/31/23  2020 09/01/23  0323   POCTGLUCOSE 208* 199* 180* 135* 123*       Current Medications and/or Treatments Impacting Glycemic Control  Immunotherapy:    Immunosuppressants       None          Steroids:   Hormones (From admission, onward)      None          Pressors:    Autonomic Drugs (From admission, onward)      None          Hyperglycemia/Diabetes Medications:   Antihyperglycemics (From admission, onward)      Start     Stop Route Frequency Ordered    08/31/23 2100  insulin detemir U-100 (Levemir) pen 9 Units         -- SubQ 2 times daily 08/31/23 1550    08/31/23 1647  insulin aspart U-100 pen 0-10 Units         -- SubQ Every 4 hours PRN 08/31/23 1550            ASSESSMENT and PLAN    Neuro  * Embolic stroke involving left middle cerebral artery  Managed per primary team  Avoid hypoglycemia        Cardiac/Vascular  LVAD (left ventricular assist device) present  Managed per primary team  Avoid hypoglycemia        Endocrine  Type 2 diabetes mellitus with hyperglycemia    Endocrinology consulted for BG management.   BG goal 140-180    - Decrease Levemir (Insulin Detemir) to 8 units BID  - Novolog (Insulin Aspart) prn for BG excursions MDC SSI (150/25)  - BG checks q4hr  - Hypoglycemia protocol in place  - If blood glucose greater than 300, please ask patient not to eat food  or drink anything other than water until correctional insulin has brought it back below 250    ** Please notify Endocrine for any change and/or advance in diet**  ** Please call Endocrine for any BG related issues **    Discharge Planning:   TBD. Please notify endocrinology prior to discharge.          Jody Starkey NP  Endocrinology  [facility] - Surgical Intensive Care

## 2023-09-01 NOTE — PLAN OF CARE
Pulmonary & Critical Care Medicine Plan of Care Note    Called to bedside as nurse noted pt on SBT and was agitated and might self extubate. Started to walk up to unit and was told he went apneic and was put back on a rate. Fentanyl turned off a couple hours ago, propofol turned off a bit ago in order to get EEG while off sedation. Upon arrival, pt on AV/VC with Vt 480, PEEP 5, FiO2 40%, and RR 12. Pt with spontaneous eye opening and response to verbal stimuli. Placed on SBT with PS 5, PEEP 5, and FiO2 40%. Pt triggered apnea alarm initially but likely trigger was set too soon. Apnea alarm delayed and pt placed back on SBT while at bedside. Pt with spontaneous respirations, RR around 10-12 but low tidal volumes (120s-200s). Asked pt to take some deep breaths, able to follow commands by squeezing b/l upper extremities. Then pt seemed to get acutely agitated and swung L arm at nurse. Attempted redirection but pt starting to sit up in bed. The bedside nurse and I were holding his arms but pt was able to pull off vent, pull out OG and then pull out ETT. Pt able to spit up secretions right after extubation. Primary team called to bedside to discuss. Pt hemodynamically stable on RA after self-extubation. Continue to monitor closely due to low Vt ventilation prior to extubation during SBT and waxing/waning mental status.       Inessa Orozco MD  [facility] Fellow  09/01/2023 2:51 PM

## 2023-09-01 NOTE — PT/OT/SLP PROGRESS
Occupational Therapy      Patient Name:  Kevan Queen   MRN:  [id number]    Patient not seen today secondary to  (pt on hold due to being intubated and sedated.). Will follow-up at a later date    9/1/2023

## 2023-09-01 NOTE — ASSESSMENT & PLAN NOTE
- Last Echo (7/31/2023): HM3 at 5100. AV does not open. IV septum midline. EF 10-15%. LV severely dilated. Normal wall thickness. Severe global hypokinesis. RV not well visualized due to poor acoustic window. Biatrial enlargment. Mod TR.    - Home GDMT: valsartan 40mg bid  - Home diuretic regimen: Lasix 80mg daily.   - Hypervolemic on exam today. CVP 10 from the PICC line. Continue IV lasix 80mg tid and will adjust accordingly based on repsonse.   - Ionotropes: continue home milrinone 0.25.   - Strict I&Os and daily weights.   - Maintain K>4 and Mg>2

## 2023-09-01 NOTE — CARE UPDATE
Patient self Extubated at 1443. SPO2 100, , RR 32 , BS coarse. Vent and High Flow Bubble nasal cannula at bedside, not currently needed.

## 2023-09-01 NOTE — ASSESSMENT & PLAN NOTE
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  -Coumadin held due to concern for petechial hemorrhage. Repeat CT stable so started back on heparin.  Goal INR 2.0-3.0. will resume coumadin once patient passes swallow study   -LDH is stable.  Will continue to monitor daily  -Speed set at 5100, LSL 4700 rpm  -Echo: 7/31/23 EF: 10-15%, LVEDD: 6.87 cm.  No vegation appreciated     Procedure: Device Interrogation Including analysis of device parameters  Current Settings: Ventricular Assist Device  Review of device function is stable        9/1/2023     1:00 PM 9/1/2023    12:00 PM 9/1/2023    11:00 AM 9/1/2023    10:00 AM 9/1/2023     9:00 AM 9/1/2023     8:00 AM 9/1/2023     7:00 AM   TXP LVAD INTERROGATIONS   Type HeartMate3 HeartMate3 HeartMate3 HeartMate3 HeartMate3 HeartMate3 HeartMate3   Flow 4.2 4 4.2 4.2 4.2 4.1 4.2   Speed 5100 5100 5100 5100 5100 5100 5100   PI 5.2 5.5 4.4 4.5 4.4 4.6 4.3   Power (Serna) 3.5 3.5 3.5 3.5 3.5 3.5 3.5   LSL 4700 4700 4700 4700 4700 4700 4700   Pulsatility  Intermittent pulse Intermittent pulse Intermittent pulse Intermittent pulse Intermittent pulse

## 2023-09-01 NOTE — ASSESSMENT & PLAN NOTE
Presented with altered mental status at mcg/kg/min OSH and found to have stroke. Transferred to [facility] due to LVAD history and vomited on arrival. Possible seizures. Intubated for airway protection. CXR with cardiomegaly however poor penetration so difficult to assess pulmonary edema.  Last EF 10-15% s/p LVAD. Recent COVID, no recent ARDS, COPD, asthma. Does not appear to have acute insult to lungs.    ABG from this mornin.556 High      POC PCO2 35 - 45 mmHg 40.4    POC PO2 80 - 100 mmHg 146 High     POC HCO3 24 - 28 mmol/L 35.8 High     POC BE -2 to 2 mmol/L 14    POC SATURATED O2 95 - 100 % 100    POC TCO2 23 - 27 mmol/L 37 High     Rate  15    Sample  ARTERIAL      - Respiratory rate to 12 and tidal volume to <500 mL  - PEEP of 5, recent COVID infection but no signs of ARDS  - Secretion management  - Repeat ABG ordered prior to SAT/SBT

## 2023-09-01 NOTE — SUBJECTIVE & OBJECTIVE
Interval History: No acute events overnight. At the time of examination patient remained intubated and sedated.     Continuous Infusions:   fentanyl 50 mcg/hr (09/01/23 1000)    milrinone 20mg/100ml D5W (200mcg/ml) 0.25 mcg/kg/min (09/01/23 1100)    nicardipine Stopped (08/31/23 1243)    propofoL 50 mcg/kg/min (09/01/23 1100)     Scheduled Meds:   aspirin  81 mg Oral Daily    atorvastatin  40 mg Oral Daily    ceFAZolin (ANCEF) 8 g in dextrose 5 % (D5W) 500 mL CONTINUOUS INFUSION  8 g Intravenous Q24H    furosemide (LASIX) injection  80 mg Intravenous Q8H    insulin detemir U-100  9 Units Subcutaneous BID    levETIRAcetam (Keppra) IV (PEDS and ADULTS)  1,000 mg Intravenous Q12H    mupirocin   Nasal BID    pantoprazole  40 mg Intravenous Daily    polyethylene glycol  17 g Oral Daily    senna  8.6 mg Oral Daily     PRN Meds:dextrose 10%, dextrose 10%, fentaNYL, glucagon (human recombinant), insulin aspart U-100, sodium chloride 0.9%    Review of patient's allergies indicates:   Allergen Reactions    Bumex [bumetanide] Hives    Torsemide Hives     Objective:     Vital Signs (Most Recent):  Temp: 98.2 °F (36.8 °C) (09/01/23 0300)  Pulse: (!) 178 (09/01/23 1030)  Resp: 12 (09/01/23 1030)  BP: 94/64 (09/01/23 0326)  SpO2: 100 % (09/01/23 1030) Vital Signs (24h Range):  Temp:  [97.9 °F (36.6 °C)-99.7 °F (37.6 °C)] 98.2 °F (36.8 °C)  Pulse:  [] 178  Resp:  [12-39] 12  SpO2:  [78 %-100 %] 100 %  BP: ()/(0-92) 94/64  Arterial Line BP: ()/(64-93) 87/74     Patient Vitals for the past 72 hrs (Last 3 readings):   Weight   09/01/23 0305 96.6 kg (213 lb)   08/31/23 1017 95.5 kg (210 lb 8.6 oz)     Body mass index is 26.62 kg/m².      Intake/Output Summary (Last 24 hours) at 9/1/2023 1135  Last data filed at 9/1/2023 1100  Gross per 24 hour   Intake 1283.76 ml   Output 5010 ml   Net -3726.24 ml       Hemodynamic Parameters:  CVP:  [12 mmHg-16 mmHg] 12 mmHg           Physical Exam  Constitutional:       Appearance:  Normal appearance.      Comments: Intubated and sedated   HENT:      Head: Normocephalic and atraumatic.   Eyes:      Conjunctiva/sclera: Conjunctivae normal.   Neck:      Vascular: JVD present.      Comments: JVP 13cm of H20 at 45 degrees.   Cardiovascular:      Comments: VAD hum appreciated  Pulmonary:      Breath sounds: Normal breath sounds.      Comments: Clear to auscultation  Abdominal:      Comments: Soft, non-tender, non-distended   Musculoskeletal:      Cervical back: Normal range of motion.      Comments: No peripheral edema   Skin:     General: Skin is warm and dry.      Capillary Refill: Capillary refill takes less than 2 seconds.   Neurological:      Comments: Sedated            Significant Labs:  CBC:  Recent Labs   Lab 08/31/23  0536 08/31/23 1046 09/01/23 0311   WBC 11.55* 12.54 11.89   RBC 4.08* 3.56* 3.40*   HGB 11.6* 10.1* 9.4*   HCT 36.9* 31.7* 29.7*    244 248   MCV 90.4 89 87   MCH 28.4 28.4 27.6   MCHC 31.4* 31.9* 31.6*     BNP:  Recent Labs   Lab 08/31/23  1046   *     CMP:  Recent Labs   Lab 08/31/23  1046 08/31/23  1250 08/31/23 2011 09/01/23 0311   * 198* 142* 113*   CALCIUM 8.3* 8.2* 8.5* 8.4*   ALBUMIN 2.9* 2.8*  --  2.6*   PROT 7.9 7.7  --  7.3    137 140 140   K 3.6 4.0 3.4* 2.9*   CO2 30* 26 28 31*    100 100 99   BUN 8 8 8 7   CREATININE 1.1 1.1 1.1 1.0   ALKPHOS 101 95  --  90   ALT <5* <5*  --  <5*   AST 22 21  --  17   BILITOT 0.7 0.7  --  0.7      Coagulation:   Recent Labs   Lab 08/31/23  0536 08/31/23  1046 09/01/23 0311   INR 1.2 1.3* 1.3*   APTT  --   --  25.9     LDH:  Recent Labs   Lab 09/01/23  0311   *     Microbiology:  Microbiology Results (last 7 days)       ** No results found for the last 168 hours. **            I have reviewed all pertinent labs within the past 24 hours.    Estimated Creatinine Clearance: 119.7 mL/min (based on SCr of 1 mg/dL).    Diagnostic Results:  I have reviewed all pertinent imaging  results/findings within the past 24 hours.

## 2023-09-01 NOTE — HOSPITAL COURSE
8/31/23 -   9/1/23 - Intubated/sedated, Neuro exam unchanged, CTH no new or expanding bleed. Sedation off in PM  9/2/2023 Patient self extubated yesterday. Also, had to be placed on sedation and restraints. Remains in upper extremity restraints but is able to move all extremities AG. Appears somewhat confused. Patient currently on heparin gtt with plan to switch to coumadin when evaluated by SLP per primary team notes. Continue RF modification with AC and statin. Unable to go for MRI 2/2 LVAD. No additional recommendations at this time from stroke perspective. Vascular neurology will sign off. Please do not hesitate to contact us if we can be of further assistance.

## 2023-09-02 LAB
ALBUMIN SERPL BCP-MCNC: 2.7 G/DL (ref 3.5–5.2)
ALP SERPL-CCNC: 97 U/L (ref 55–135)
ALT SERPL W/O P-5'-P-CCNC: <5 U/L (ref 10–44)
AMPHET+METHAMPHET UR QL: NEGATIVE
ANION GAP SERPL CALC-SCNC: 12 MMOL/L (ref 8–16)
ANION GAP SERPL CALC-SCNC: 12 MMOL/L (ref 8–16)
APTT PPP: 33.4 SEC (ref 21–32)
APTT PPP: 33.4 SEC (ref 21–32)
APTT PPP: 35.3 SEC (ref 21–32)
APTT PPP: 35.6 SEC (ref 21–32)
APTT PPP: 42.2 SEC (ref 21–32)
AST SERPL-CCNC: 19 U/L (ref 10–40)
BARBITURATES UR QL SCN>200 NG/ML: NEGATIVE
BASOPHILS # BLD AUTO: 0.03 K/UL (ref 0–0.2)
BASOPHILS NFR BLD: 0.3 % (ref 0–1.9)
BENZODIAZ UR QL SCN>200 NG/ML: NEGATIVE
BILIRUB SERPL-MCNC: 1.1 MG/DL (ref 0.1–1)
BUN SERPL-MCNC: 10 MG/DL (ref 6–20)
BUN SERPL-MCNC: 7 MG/DL (ref 6–20)
BZE UR QL SCN: NEGATIVE
CALCIUM SERPL-MCNC: 8.4 MG/DL (ref 8.7–10.5)
CALCIUM SERPL-MCNC: 8.7 MG/DL (ref 8.7–10.5)
CANNABINOIDS UR QL SCN: NEGATIVE
CHLORIDE SERPL-SCNC: 98 MMOL/L (ref 95–110)
CHLORIDE SERPL-SCNC: 99 MMOL/L (ref 95–110)
CO2 SERPL-SCNC: 26 MMOL/L (ref 23–29)
CO2 SERPL-SCNC: 28 MMOL/L (ref 23–29)
CREAT SERPL-MCNC: 0.9 MG/DL (ref 0.5–1.4)
CREAT SERPL-MCNC: 1.2 MG/DL (ref 0.5–1.4)
CREAT UR-MCNC: 284 MG/DL (ref 23–375)
DIFFERENTIAL METHOD: ABNORMAL
EOSINOPHIL # BLD AUTO: 0 K/UL (ref 0–0.5)
EOSINOPHIL NFR BLD: 0.2 % (ref 0–8)
ERYTHROCYTE [DISTWIDTH] IN BLOOD BY AUTOMATED COUNT: 19.2 % (ref 11.5–14.5)
EST. GFR  (NO RACE VARIABLE): >60 ML/MIN/1.73 M^2
EST. GFR  (NO RACE VARIABLE): >60 ML/MIN/1.73 M^2
ETHANOL UR-MCNC: <10 MG/DL
GLUCOSE SERPL-MCNC: 136 MG/DL (ref 70–110)
GLUCOSE SERPL-MCNC: 150 MG/DL (ref 70–110)
HCT VFR BLD AUTO: 32.9 % (ref 40–54)
HGB BLD-MCNC: 10.1 G/DL (ref 14–18)
IMM GRANULOCYTES # BLD AUTO: 0.03 K/UL (ref 0–0.04)
IMM GRANULOCYTES NFR BLD AUTO: 0.3 % (ref 0–0.5)
INR PPP: 1.3 (ref 0.8–1.2)
LDH SERPL L TO P-CCNC: 368 U/L (ref 110–260)
LYMPHOCYTES # BLD AUTO: 1.6 K/UL (ref 1–4.8)
LYMPHOCYTES NFR BLD: 13.9 % (ref 18–48)
MAGNESIUM SERPL-MCNC: 1.6 MG/DL (ref 1.6–2.6)
MCH RBC QN AUTO: 26.9 PG (ref 27–31)
MCHC RBC AUTO-ENTMCNC: 30.7 G/DL (ref 32–36)
MCV RBC AUTO: 88 FL (ref 82–98)
METHADONE UR QL SCN>300 NG/ML: NEGATIVE
MONOCYTES # BLD AUTO: 0.7 K/UL (ref 0.3–1)
MONOCYTES NFR BLD: 6.5 % (ref 4–15)
NEUTROPHILS # BLD AUTO: 8.9 K/UL (ref 1.8–7.7)
NEUTROPHILS NFR BLD: 78.8 % (ref 38–73)
NRBC BLD-RTO: 0 /100 WBC
OPIATES UR QL SCN: NEGATIVE
PCP UR QL SCN>25 NG/ML: NEGATIVE
PHOSPHATE SERPL-MCNC: 3.2 MG/DL (ref 2.7–4.5)
PLATELET # BLD AUTO: 279 K/UL (ref 150–450)
PMV BLD AUTO: 9.9 FL (ref 9.2–12.9)
POCT GLUCOSE: 123 MG/DL (ref 70–110)
POCT GLUCOSE: 137 MG/DL (ref 70–110)
POCT GLUCOSE: 137 MG/DL (ref 70–110)
POCT GLUCOSE: 140 MG/DL (ref 70–110)
POCT GLUCOSE: 146 MG/DL (ref 70–110)
POTASSIUM SERPL-SCNC: 2.9 MMOL/L (ref 3.5–5.1)
POTASSIUM SERPL-SCNC: 3.4 MMOL/L (ref 3.5–5.1)
PROT SERPL-MCNC: 7.6 G/DL (ref 6–8.4)
PROTHROMBIN TIME: 13.3 SEC (ref 9–12.5)
RBC # BLD AUTO: 3.75 M/UL (ref 4.6–6.2)
SODIUM SERPL-SCNC: 136 MMOL/L (ref 136–145)
SODIUM SERPL-SCNC: 139 MMOL/L (ref 136–145)
TOXICOLOGY INFORMATION: NORMAL
WBC # BLD AUTO: 11.31 K/UL (ref 3.9–12.7)

## 2023-09-02 PROCEDURE — 25000003 PHARM REV CODE 250: Performed by: PHYSICIAN ASSISTANT

## 2023-09-02 PROCEDURE — 85025 COMPLETE CBC W/AUTO DIFF WBC: CPT | Performed by: STUDENT IN AN ORGANIZED HEALTH CARE EDUCATION/TRAINING PROGRAM

## 2023-09-02 PROCEDURE — C9113 INJ PANTOPRAZOLE SODIUM, VIA: HCPCS | Performed by: STUDENT IN AN ORGANIZED HEALTH CARE EDUCATION/TRAINING PROGRAM

## 2023-09-02 PROCEDURE — 25000003 PHARM REV CODE 250: Performed by: INTERNAL MEDICINE

## 2023-09-02 PROCEDURE — 83735 ASSAY OF MAGNESIUM: CPT | Performed by: STUDENT IN AN ORGANIZED HEALTH CARE EDUCATION/TRAINING PROGRAM

## 2023-09-02 PROCEDURE — 99291 PR CRITICAL CARE, E/M 30-74 MINUTES: ICD-10-PCS | Mod: 25,,, | Performed by: INTERNAL MEDICINE

## 2023-09-02 PROCEDURE — 80307 DRUG TEST PRSMV CHEM ANLYZR: CPT | Performed by: PHYSICIAN ASSISTANT

## 2023-09-02 PROCEDURE — 93750 INTERROGATION VAD IN PERSON: CPT | Mod: ,,, | Performed by: INTERNAL MEDICINE

## 2023-09-02 PROCEDURE — 80048 BASIC METABOLIC PNL TOTAL CA: CPT | Mod: XB | Performed by: STUDENT IN AN ORGANIZED HEALTH CARE EDUCATION/TRAINING PROGRAM

## 2023-09-02 PROCEDURE — 85610 PROTHROMBIN TIME: CPT | Performed by: STUDENT IN AN ORGANIZED HEALTH CARE EDUCATION/TRAINING PROGRAM

## 2023-09-02 PROCEDURE — 92610 EVALUATE SWALLOWING FUNCTION: CPT

## 2023-09-02 PROCEDURE — 97161 PT EVAL LOW COMPLEX 20 MIN: CPT

## 2023-09-02 PROCEDURE — 94761 N-INVAS EAR/PLS OXIMETRY MLT: CPT

## 2023-09-02 PROCEDURE — 93750 PR INTERROGATE VENT ASSIST DEV, IN PERSON, W PHYSICIAN ANALYSIS: ICD-10-PCS | Mod: ,,, | Performed by: INTERNAL MEDICINE

## 2023-09-02 PROCEDURE — 51798 US URINE CAPACITY MEASURE: CPT

## 2023-09-02 PROCEDURE — 27000248 HC VAD-ADDITIONAL DAY

## 2023-09-02 PROCEDURE — 63600175 PHARM REV CODE 636 W HCPCS: Performed by: STUDENT IN AN ORGANIZED HEALTH CARE EDUCATION/TRAINING PROGRAM

## 2023-09-02 PROCEDURE — 97530 THERAPEUTIC ACTIVITIES: CPT

## 2023-09-02 PROCEDURE — 99233 PR SUBSEQUENT HOSPITAL CARE,LEVL III: ICD-10-PCS | Mod: ,,, | Performed by: PSYCHIATRY & NEUROLOGY

## 2023-09-02 PROCEDURE — 85730 THROMBOPLASTIN TIME PARTIAL: CPT | Mod: 91 | Performed by: INTERNAL MEDICINE

## 2023-09-02 PROCEDURE — 80053 COMPREHEN METABOLIC PANEL: CPT | Performed by: STUDENT IN AN ORGANIZED HEALTH CARE EDUCATION/TRAINING PROGRAM

## 2023-09-02 PROCEDURE — 99232 PR SUBSEQUENT HOSPITAL CARE,LEVL II: ICD-10-PCS | Mod: ,,, | Performed by: NURSE PRACTITIONER

## 2023-09-02 PROCEDURE — 63600175 PHARM REV CODE 636 W HCPCS: Performed by: INTERNAL MEDICINE

## 2023-09-02 PROCEDURE — 25000003 PHARM REV CODE 250: Performed by: STUDENT IN AN ORGANIZED HEALTH CARE EDUCATION/TRAINING PROGRAM

## 2023-09-02 PROCEDURE — 99232 SBSQ HOSP IP/OBS MODERATE 35: CPT | Mod: ,,, | Performed by: NURSE PRACTITIONER

## 2023-09-02 PROCEDURE — 83615 LACTATE (LD) (LDH) ENZYME: CPT | Performed by: STUDENT IN AN ORGANIZED HEALTH CARE EDUCATION/TRAINING PROGRAM

## 2023-09-02 PROCEDURE — 97535 SELF CARE MNGMENT TRAINING: CPT

## 2023-09-02 PROCEDURE — 97165 OT EVAL LOW COMPLEX 30 MIN: CPT

## 2023-09-02 PROCEDURE — 85730 THROMBOPLASTIN TIME PARTIAL: CPT | Performed by: STUDENT IN AN ORGANIZED HEALTH CARE EDUCATION/TRAINING PROGRAM

## 2023-09-02 PROCEDURE — 95718 PR EEG, W/VIDEO, CONT RECORD, I&R, 2-12 HRS: ICD-10-PCS | Mod: ,,, | Performed by: STUDENT IN AN ORGANIZED HEALTH CARE EDUCATION/TRAINING PROGRAM

## 2023-09-02 PROCEDURE — 20000000 HC ICU ROOM

## 2023-09-02 PROCEDURE — 84100 ASSAY OF PHOSPHORUS: CPT | Performed by: INTERNAL MEDICINE

## 2023-09-02 PROCEDURE — 99291 CRITICAL CARE FIRST HOUR: CPT | Mod: 25,,, | Performed by: INTERNAL MEDICINE

## 2023-09-02 PROCEDURE — 95718 EEG PHYS/QHP 2-12 HR W/VEEG: CPT | Mod: ,,, | Performed by: STUDENT IN AN ORGANIZED HEALTH CARE EDUCATION/TRAINING PROGRAM

## 2023-09-02 PROCEDURE — 99233 SBSQ HOSP IP/OBS HIGH 50: CPT | Mod: ,,, | Performed by: PSYCHIATRY & NEUROLOGY

## 2023-09-02 RX ORDER — IBUPROFEN 200 MG
16 TABLET ORAL
Status: DISCONTINUED | OUTPATIENT
Start: 2023-09-02 | End: 2023-09-11 | Stop reason: HOSPADM

## 2023-09-02 RX ORDER — SODIUM CHLORIDE 9 MG/ML
INJECTION, SOLUTION INTRAVENOUS
Status: DISCONTINUED | OUTPATIENT
Start: 2023-09-02 | End: 2023-09-11 | Stop reason: HOSPADM

## 2023-09-02 RX ORDER — GLUCAGON 1 MG
1 KIT INJECTION
Status: DISCONTINUED | OUTPATIENT
Start: 2023-09-02 | End: 2023-09-11 | Stop reason: HOSPADM

## 2023-09-02 RX ORDER — IBUPROFEN 200 MG
24 TABLET ORAL
Status: DISCONTINUED | OUTPATIENT
Start: 2023-09-02 | End: 2023-09-11 | Stop reason: HOSPADM

## 2023-09-02 RX ORDER — INSULIN ASPART 100 [IU]/ML
0-10 INJECTION, SOLUTION INTRAVENOUS; SUBCUTANEOUS
Status: DISCONTINUED | OUTPATIENT
Start: 2023-09-02 | End: 2023-09-11 | Stop reason: HOSPADM

## 2023-09-02 RX ORDER — BUSPIRONE HYDROCHLORIDE 5 MG/1
10 TABLET ORAL 2 TIMES DAILY
Status: DISCONTINUED | OUTPATIENT
Start: 2023-09-02 | End: 2023-09-11 | Stop reason: HOSPADM

## 2023-09-02 RX ORDER — MAGNESIUM SULFATE HEPTAHYDRATE 40 MG/ML
2 INJECTION, SOLUTION INTRAVENOUS ONCE
Status: COMPLETED | OUTPATIENT
Start: 2023-09-02 | End: 2023-09-02

## 2023-09-02 RX ORDER — BUSPIRONE HYDROCHLORIDE 10 MG/1
10 TABLET ORAL DAILY
Status: DISCONTINUED | OUTPATIENT
Start: 2023-09-02 | End: 2023-09-02

## 2023-09-02 RX ORDER — POTASSIUM CHLORIDE 29.8 MG/ML
40 INJECTION INTRAVENOUS ONCE
Status: COMPLETED | OUTPATIENT
Start: 2023-09-02 | End: 2023-09-02

## 2023-09-02 RX ADMIN — PANTOPRAZOLE SODIUM 40 MG: 40 INJECTION, POWDER, FOR SOLUTION INTRAVENOUS at 08:09

## 2023-09-02 RX ADMIN — SODIUM CHLORIDE: 9 INJECTION, SOLUTION INTRAVENOUS at 09:09

## 2023-09-02 RX ADMIN — ASPIRIN 81 MG 81 MG: 81 TABLET ORAL at 11:09

## 2023-09-02 RX ADMIN — LEVETIRACETAM 1000 MG: 100 INJECTION, SOLUTION INTRAVENOUS at 09:09

## 2023-09-02 RX ADMIN — CEFAZOLIN 8 G: 10 INJECTION, POWDER, FOR SOLUTION INTRAVENOUS at 01:09

## 2023-09-02 RX ADMIN — BUSPIRONE HYDROCHLORIDE 10 MG: 10 TABLET ORAL at 09:09

## 2023-09-02 RX ADMIN — MILRINONE LACTATE IN DEXTROSE 0.25 MCG/KG/MIN: 200 INJECTION, SOLUTION INTRAVENOUS at 02:09

## 2023-09-02 RX ADMIN — MUPIROCIN: 20 OINTMENT TOPICAL at 09:09

## 2023-09-02 RX ADMIN — POLYETHYLENE GLYCOL 3350 17 G: 17 POWDER, FOR SOLUTION ORAL at 11:09

## 2023-09-02 RX ADMIN — DEXMEDETOMIDINE HYDROCHLORIDE 0.6 MCG/KG/HR: 4 INJECTION INTRAVENOUS at 01:09

## 2023-09-02 RX ADMIN — MILRINONE LACTATE IN DEXTROSE 0.25 MCG/KG/MIN: 200 INJECTION, SOLUTION INTRAVENOUS at 06:09

## 2023-09-02 RX ADMIN — HEPARIN SODIUM 16 UNITS/KG/HR: 10000 INJECTION, SOLUTION INTRAVENOUS at 05:09

## 2023-09-02 RX ADMIN — POTASSIUM CHLORIDE 40 MEQ: 29.8 INJECTION, SOLUTION INTRAVENOUS at 04:09

## 2023-09-02 RX ADMIN — MAGNESIUM SULFATE 2 G: 2 INJECTION INTRAVENOUS at 09:09

## 2023-09-02 RX ADMIN — ATORVASTATIN CALCIUM 40 MG: 20 TABLET, FILM COATED ORAL at 11:09

## 2023-09-02 RX ADMIN — FUROSEMIDE 80 MG: 10 INJECTION, SOLUTION INTRAVENOUS at 05:09

## 2023-09-02 RX ADMIN — WARFARIN SODIUM 4 MG: 4 TABLET ORAL at 05:09

## 2023-09-02 RX ADMIN — SODIUM CHLORIDE 250 ML: 9 INJECTION, SOLUTION INTRAVENOUS at 11:09

## 2023-09-02 RX ADMIN — MUPIROCIN: 20 OINTMENT TOPICAL at 08:09

## 2023-09-02 RX ADMIN — SENNOSIDES 8.6 MG: 8.6 TABLET, FILM COATED ORAL at 11:09

## 2023-09-02 NOTE — PLAN OF CARE
Problem: Physical Therapy  Goal: Physical Therapy Goal  Description: Goals to be met by: 23     Patient will increase functional independence with mobility by performin. Supine to sit with Modified [place]  2. Sit to stand transfer with Supervision  3. Gait  x 250 feet with Stand-by Assistance using AD if needed.   4. Pt transfer bed to bedside chair with SBA. .     Outcome: Ongoing, Progressing   Evaluation completed and goals appropriate. 2023

## 2023-09-02 NOTE — SUBJECTIVE & OBJECTIVE
Neurologic Chief Complaint:L parietal infarct    Subjective:     Interval History: Patient is seen for follow-up neurological assessment and treatment recommendations:       Patient self extubated yesterday. Also, had to be placed on sedation and restraints. Remains in upper extremity restraints but is able to move all extremities AG. Appears somewhat confused. Patient currently on heparin gtt with plan to switch to coumadin when evaluated by SLP per primary team notes. Continue RF modification with AC and statin. Unable to go for MRI 2/2 LVAD. Per discussion with staff/fellow, unlikely patient's encephalopathy is due to stroke. No additional recommendations at this time from stroke perspective. Vascular neurology will sign off. Please do not hesitate to contact us if we can be of further assistance.    HPI, Past Medical, Family, and Social History remains the same as documented in the initial encounter.     Review of Systems   Cardiovascular:  Negative for chest pain.   Gastrointestinal:  Negative for abdominal pain.   Psychiatric/Behavioral:  Positive for confusion.      Scheduled Meds:   aspirin  81 mg Oral Daily    atorvastatin  40 mg Oral Daily    ceFAZolin (ANCEF) 8 g in dextrose 5 % (D5W) 500 mL CONTINUOUS INFUSION  8 g Intravenous Q24H    insulin detemir U-100  8 Units Subcutaneous BID    levETIRAcetam (Keppra) IV (PEDS and ADULTS)  1,000 mg Intravenous Q12H    mupirocin   Nasal BID    pantoprazole  40 mg Intravenous Daily    polyethylene glycol  17 g Oral Daily    senna  8.6 mg Oral Daily    warfarin  4 mg Oral Daily     Continuous Infusions:   dexmedeTOMIDine (Precedex) infusion (titrating) Stopped (09/02/23 1000)    heparin (porcine) in D5W 15 Units/kg/hr (09/02/23 1100)    milrinone 20mg/100ml D5W (200mcg/ml) 0.25 mcg/kg/min (09/02/23 1100)    nicardipine Stopped (08/31/23 1243)     PRN Meds:sodium chloride 0.9%, dextrose 10%, dextrose 10%, glucagon (human recombinant), insulin aspart U-100, sodium  chloride 0.9%    Objective:     Vital Signs (Most Recent):  Temp: 98.3 °F (36.8 °C) (09/02/23 0745)  Pulse: 95 (09/02/23 1100)  Resp: (!) 26 (09/02/23 1100)  BP: (!) 78/0 (09/02/23 0745)  SpO2: 100 % (09/02/23 1100)  BP Location: Left arm    Vital Signs Range (Last 24H):  Temp:  [98 °F (36.7 °C)-98.7 °F (37.1 °C)]   Pulse:  []   Resp:  [11-30]   BP: (78-84)/(0)   SpO2:  [77 %-100 %]   Arterial Line BP: ()/(56-87)   BP Location: Left arm       Physical Exam  Vitals and nursing note reviewed.   Constitutional:       General: He is not in acute distress.  HENT:      Head: Normocephalic and atraumatic.   Eyes:      Conjunctiva/sclera: Conjunctivae normal.   Cardiovascular:      Rate and Rhythm: Normal rate.   Pulmonary:      Effort: Pulmonary effort is normal.   Skin:     General: Skin is dry.   Neurological:      Mental Status: He is alert.      Comments: Moves all extremities AG  No facial asymmetry              Neurological Exam:   LOC: alert  Attention Span: Good   Language: No aphasia  Articulation: No dysarthria  Facial Movement (CN VII): Symmetric facial expression    Motor: moves all extremities spontaneously  Tone: Normal tone throughout    Laboratory:  CMP:   Recent Labs   Lab 09/02/23  0304   CALCIUM 8.7   ALBUMIN 2.7*   PROT 7.6      K 3.4*   CO2 28   CL 99   BUN 7   CREATININE 0.9   ALKPHOS 97   ALT <5*   AST 19   BILITOT 1.1*     BMP:   Recent Labs   Lab 08/31/23  0536 08/31/23  1046 09/02/23  0304   GLUCOSE 227*  --   --       < > 139   K 4.0   < > 3.4*   CL  --    < > 99   CO2 29   < > 28   BUN 8.1*   < > 7   CREATININE 1.37*   < > 0.9   CALCIUM 8.3*   < > 8.7    < > = values in this interval not displayed.     CBC:   Recent Labs   Lab 09/02/23  0304   WBC 11.31   RBC 3.75*   HGB 10.1*   HCT 32.9*      MCV 88   MCH 26.9*   MCHC 30.7*     Lipid Panel:   Recent Labs   Lab 08/31/23  1634   CHOL 118*   LDLCALC 63.2   HDL 37*   TRIG 89     Coagulation:   Recent Labs   Lab  "09/02/23  0304 09/02/23  1009   INR 1.3*  --    APTT 33.4*  33.4* 35.6*     Platelet Aggregation Study: No results for input(s): "PLTAGG", "PLTAGINTERP", "PLTAGREGLACO", "ADPPLTAGGREG" in the last 168 hours.  Hgb A1C:   Recent Labs   Lab 08/31/23  1634   HGBA1C 6.9*     TSH:   Recent Labs   Lab 08/31/23  1634   TSH 0.869       Diagnostic Results     Brain/Vessel Imaging   CTH WO 9/1/2023  Stable edema in the left parietal lobe.  Again it is unclear whether this reflects a recent infarct or infectious/inflammatory process including cerebritis.    CTA Stroke MP 8/31/2023 1145   Left parietal edema, nonspecific.  It is unclear whether this represents a recent infarct, focus of edema from demyelination or encephalitis, or other underlying lesion.  No midline shift or herniation.  Questionable very mild adjacent petechial hemorrhage with no focal hematoma.     No significant stenosis at the carotid bifurcations by NASCET criteria the vertebral arteries are patent.  No evidence of dissection.     No major branch stenosis/occlusion at the [id number].  No evidence of venous thrombosis.    CTA Stroke MP 8/31/2023 0504  No large vessel occlusion or flow-limiting stenosis.     No significant change from the [facility] interpretation.    CT WO 8/31/2023  Findings suspicious for acute to subacute left parietal infarct with possible petechial hemorrhage.     No significant discrepancy with the preliminary report.       Cardiac Imaging   TTE 7/31/2023    LVAD: There is a Heartmate III LVAD running at 5100 RPM. The aortic valve does not open. The ventricular septum is at midline.    Left Ventricle: The left ventricle is severely dilated. Normal wall thickness. Severe global hypokinesis present. There is severely reduced systolic function with a visually estimated ejection fraction of 10 -15%. There is diastolic dysfunction.    Right Ventricle: Right ventricle was not well visualized due to poor acoustic window.    Biatrial " enlargement.    Tricuspid Valve: There is moderate transvalvular regurgitation.    Estimated PASP is 33 mmHg.    IVC/SVC: Normal venous pressure at 3 mmHg.

## 2023-09-02 NOTE — ASSESSMENT & PLAN NOTE
39 y/o M w PMHx of DM, CHF, cardiomyopathy s/p LVAD and ICD, drug abuse, medication noncompliance, and seizures that was transferred from [place] with acute/subacute L MCA stroke for higher level care and neurology consulted for AED/seizure management after AMS event yesterday. He initially presented to OSH ED after seizure like event at home and then noted to have aphasia and RSW and CT head showed L parietal hypodensity w blood products. While at [facility] he had episode of AMS with moaning and non responsive and stroke code called and CT stable and patient was intubated for airway protection and EEG placed while on propofol that showed L sided dysfunction but no seizures. Propofol discontinued and no discrete seizures noted and patient started on LEV 1g BID. While off sedation awake and alert and able to slowly follow commands but noted weakness no R sided.     Recommendations   -- Keep EEG overnight for close monitoring while off sedation   -- Continue LEV 1g BID and continue to hold propofol if able   -- If EEG overnight unremarkable okay to d/c EEG and continue current AEDs  -- suspect AMS could be 2/2 post ictal state vs infarct. Unclear what's patient's baseline cognitive state. No signs to suggest CNS infection at this time

## 2023-09-02 NOTE — PROGRESS NOTES
"[facility]  Endocrinology  Progress Note    Admit Date: 8/31/2023     Reason for Consult: Management of T2DM, Hyperglycemia      Surgical Procedure and Date: LVAD 06/29/2022     Diabetes diagnosis year: 2022     Home Diabetes Medications:   -Levemir 22 units BID.   -Novolog 16 units TIDWM in addition to the following correction scale:     150 - 200 + 1 unit     201 - 250 + 2 units     251 - 300 + 3 units     301 - 350 + 4 units      > 350   + 5 units     How often checking glucose at home?  Uses Freestyle William    BG readings on regimen: 92-180s  Hypoglycemia on the regimen?  No  Missed doses on regimen?  occasionally skips mealsn and will skip Novolog with breakfast      Diabetes Complications include:     Hyperglycemia     Complicating diabetes co morbidities:   History of MI, CHF, and CKD        HPI: 38 year old male with a past medical history of CHF, DM, drug abuse, medication noncompliance, cardiomyopathy s/p LVAD, and seizures presented to ED on 8/31 for aphasia and right sided weakness. Hemorraghic stroke noted. Endocrine consulted to managed hyperglycemia and type 2 diabetes.       Interval HPI:   Overnight events: Remains in SICU. BG well controlled on current SQ insulin regimen. Diet Cardiac Thin    Eating:   <25%  Nausea: No  Hypoglycemia and intervention: No  Fever: No  TPN and/or TF: No  If yes, type of TF/TPN and rate: n/a    BP (!) 78/0 (BP Location: Left arm, Patient Position: Lying)   Pulse 95   Temp 98.3 °F (36.8 °C) (Oral)   Resp (!) 26   Ht 6' 3" (1.905 m)   Wt 90.7 kg (200 lb)   SpO2 100%   BMI 25.00 kg/m²     Labs Reviewed and Include    Recent Labs   Lab 09/02/23  0304   *   CALCIUM 8.7   ALBUMIN 2.7*   PROT 7.6      K 3.4*   CO2 28   CL 99   BUN 7   CREATININE 0.9   ALKPHOS 97   ALT <5*   AST 19   BILITOT 1.1*     Lab Results   Component Value Date    WBC 11.31 09/02/2023    HGB 10.1 (L) 09/02/2023    HCT 32.9 (L) 09/02/2023    MCV 88 09/02/2023    "  09/02/2023     Recent Labs   Lab 08/31/23  0536 08/31/23  1634   TSH 4.954* 0.869     Lab Results   Component Value Date    HGBA1C 6.9 (H) 08/31/2023       Nutritional status:   Body mass index is 25 kg/m².  Lab Results   Component Value Date    ALBUMIN 2.7 (L) 09/02/2023    ALBUMIN 2.6 (L) 09/01/2023    ALBUMIN 2.8 (L) 08/31/2023     Lab Results   Component Value Date    PREALBUMIN 15 (L) 08/07/2023    PREALBUMIN 21 08/04/2023    PREALBUMIN 29 08/02/2023       Estimated Creatinine Clearance: 133 mL/min (based on SCr of 0.9 mg/dL).    Accu-Checks  Recent Labs     08/31/23  1037 08/31/23  1254 08/31/23  1633 08/31/23  2020 09/01/23  0323 09/01/23  1050 09/01/23  2139 09/02/23  0029 09/02/23  0330   POCTGLUCOSE 208* 199* 180* 135* 123* 124* 161* 146* 137*       Current Medications and/or Treatments Impacting Glycemic Control  Immunotherapy:    Immunosuppressants       None          Steroids:   Hormones (From admission, onward)      None          Pressors:    Autonomic Drugs (From admission, onward)      None          Hyperglycemia/Diabetes Medications:   Antihyperglycemics (From admission, onward)      Start     Stop Route Frequency Ordered    09/01/23 2100  insulin detemir U-100 (Levemir) pen 8 Units         -- SubQ 2 times daily 09/01/23 1415    08/31/23 1647  insulin aspart U-100 pen 0-10 Units         -- SubQ Every 4 hours PRN 08/31/23 1550            ASSESSMENT and PLAN    Neuro  * Embolic stroke involving left middle cerebral artery  Managed per primary team  Avoid hypoglycemia        Cardiac/Vascular  LVAD (left ventricular assist device) present  Managed per primary team  Avoid hypoglycemia        Endocrine  Type 2 diabetes mellitus with hyperglycemia    Endocrinology consulted for BG management.   BG goal 140-180    - Levemir (Insulin Detemir) to 8 units BID  - Novolog (Insulin Aspart) prn for BG excursions MDC SSI (150/25)  - BG checks ac/hs   - Hypoglycemia protocol in place  - If blood glucose  greater than 300, please ask patient not to eat food or drink anything other than water until correctional insulin has brought it back below 250    ** Please notify Endocrine for any change and/or advance in diet**  ** Please call Endocrine for any BG related issues **    Discharge Planning:   TBD. Please notify endocrinology prior to discharge.          Jody Starkey NP  Endocrinology  [facility]

## 2023-09-02 NOTE — SUBJECTIVE & OBJECTIVE
Past Medical History:   Diagnosis Date    Acute respiratory failure with hypoxia 7/22/2023    Arthritis     Awareness alteration, transient 9/1/2022    Cardiomyopathy     CHF (congestive heart failure) 10/01/2020    COVID-19 6/3/2023    Diabetes mellitus     Dilated cardiomyopathy 10/26/2020    Drug abuse 10/2020    Headache 4/19/2023    Hyperglycemia 12/16/2022    Hyperosmolar hyperglycemic state (HHS) 5/25/2022    ICD (implantable cardioverter-defibrillator) in place 10/26/2020    Left ventricular assist device (LVAD) complication, initial encounter 6/5/2023    Muscle cramping 6/15/2022    Renal disorder     SOB (shortness of breath) 6/13/2022    Tingling in extremities 7/13/2022       Past Surgical History:   Procedure Laterality Date    APPLICATION OF WOUND VACUUM-ASSISTED CLOSURE DEVICE N/A 6/30/2022    Procedure: APPLICATION, WOUND VAC;  Surgeon: Luis F Paige MD;  Location: [facility] OR [address] [place] [address];  Service: Cardiovascular;  Laterality: N/A;  50 x 5 cm    CARDIAC DEFIBRILLATOR PLACEMENT      IMPLANTATION OF RIGHT VENTRICULAR ASSIST DEVICE (RVAD) N/A 6/29/2022    Procedure: INSERTION, RVAD;  Surgeon: Luis F Paige MD;  [facility];  Service: Cardiovascular;  Laterality: N/A;    INSERTION OF GRAFT TO PERICARDIUM Right 6/30/2022    Procedure: INSERTION-RIGHT VENTRICULAR ASSIST DEVICE;  Surgeon: Luis F Paige MD;  Location: [facility] OR 2ND FLR;  Service: Cardiovascular;  Laterality: Right;    IRRIGATION OF MEDIASTINUM  6/30/2022    Procedure: IRRIGATION, MEDIASTINUM;  Surgeon: Luis F Paige MD;  [facility];  Service: Cardiovascular;;    LEFT VENTRICULAR ASSIST DEVICE Left 6/23/2022    Procedure: INSERTION-LEFT VENTRICULAR ASSIST DEVICE;  Surgeon: Luis F Paige MD;  Location: [facility] OR [facility] FLR;  Service: Cardiovascular;  Laterality: Left;    LEFT VENTRICULAR ASSIST DEVICE N/A 6/29/2022    Procedure: INSERTION-LEFT VENTRICULAR ASSIST DEVICE;  Surgeon: Luis F Paige MD;  Location: [facility] OR [facility]  FLR;  Service: Cardiovascular;  Laterality: N/A;    RIGHT HEART CATHETERIZATION Right 4/8/2022    Procedure: INSERTION, CATHETER, RIGHT HEART;  Surgeon: Luca Lopez Jr., MD;  Location: [facility] CATH LAB;  Service: Cardiology;  Laterality: Right;    RIGHT HEART CATHETERIZATION Right 4/19/2022    Procedure: INSERTION, CATHETER, RIGHT HEART;  Surgeon: Josh Pulido MD;  Location: [facility] CATH LAB;  Service: Cardiology;  Laterality: Right;    RIGHT HEART CATHETERIZATION Right 7/21/2022    Procedure: INSERTION, CATHETER, RIGHT HEART;  Surgeon: Dalia Crum MD;  Location: [facility] CATH LAB;  Service: Cardiology;  Laterality: Right;    RIGHT HEART CATHETERIZATION Right 10/31/2022    Procedure: INSERTION, CATHETER, RIGHT HEART;  Surgeon: Dalia Crum MD;  Location: [facility] CATH LAB;  Service: Cardiology;  Laterality: Right;    STERNAL WOUND CLOSURE N/A 6/30/2022    Procedure: CLOSURE, WOUND, STERNUM;  Surgeon: Luis F Paige MD;  Location: [facility] OR [facility] FLR;  Service: Cardiovascular;  Laterality: N/A;       Review of patient's allergies indicates:   Allergen Reactions    Bumex [bumetanide] Hives    Torsemide Hives       Current Neurological Medications: as detailed below     No current facility-administered medications on file prior to encounter.     Current Outpatient Medications on File Prior to Encounter   Medication Sig    aspirin (ECOTRIN) 81 MG EC tablet Take 1 tablet (81 mg total) by mouth once daily.    blood sugar diagnostic Strp Use to test blood glucose 4 (four) times daily.    blood-glucose meter Misc Use as instructed    busPIRone (BUSPAR) 10 MG tablet Take 10 mg by mouth 2 (two) times daily.    dextrose 5 % (D5W) SolP 500 mL with ceFAZolin 1 gram SolR 8 g per day IV continuously    furosemide (LASIX) 80 MG tablet Take 1 tablet (80 mg total) by mouth once daily.    insulin aspart U-100 (NOVOLOG) 100 unit/mL (3 mL) InPn pen Inject 14 Units into the skin 3 (three) times daily with meals. Plus Sliding  "Scale: 151-200: +1, 201-250: +2, 251-300: +3, 301-350: +4 and call MD    insulin detemir U-100, Levemir, 100 unit/mL (3 mL) SubQ InPn pen Inject 24 Units into the skin 2 (two) times daily. (Patient not taking: Reported on 8/9/2023)    lancets 33 gauge Misc Use to test blood glucose 4 (four) times daily.    levETIRAcetam (KEPPRA) 1000 MG tablet Take 1 tablet (1,000 mg total) by mouth 2 (two) times daily.    milrinone 20mg/100ml D5W, 200mcg/ml, (PRIMACOR) 20 mg/100 mL (200 mcg/mL) infusion Inject 34.875 mcg/min into the vein continuous.    mirtazapine (REMERON) 15 MG tablet Take 1 tablet (15 mg total) by mouth nightly.    pen needle, diabetic 32 gauge x 5/32" Ndle Use to inject insulin 5 (five) times daily.    valsartan (DIOVAN) 40 MG tablet Take 1 tablet (40 mg total) by mouth 2 (two) times daily.    warfarin (COUMADIN) 3 MG tablet Take 1.5 tablets (4.5mg) orally daily, except 2 tablets (6mg) on Wednesdays and Saturdays    [DISCONTINUED] digoxin (LANOXIN) 125 mcg tablet Take 1 tablet (0.125 mg total) by mouth once daily.    [DISCONTINUED] EScitalopram oxalate (LEXAPRO) 5 MG Tab Take 1 tablet (5 mg total) by mouth once daily.    [DISCONTINUED] insulin (LANTUS SOLOSTAR U-100 INSULIN) glargine 100 units/mL (3mL) SubQ pen Inject 10 Units into the skin every evening. (Patient not taking: Reported on 5/24/2022)    [DISCONTINUED] metOLazone (ZAROXOLYN) 2.5 MG tablet Take 2.5 mg by mouth every other day.    [DISCONTINUED] metoprolol succinate (TOPROL-XL) 25 MG 24 hr tablet TAKE 1 TABLET(25 MG) BY MOUTH EVERY DAY    [DISCONTINUED] pantoprazole (PROTONIX) 40 MG tablet Take 1 tablet (40 mg total) by mouth once daily.     Family History       Problem Relation (Age of Onset)    Diverticulosis Brother    Heart attack Maternal Grandmother, Maternal Grandfather    Heart failure Father          Tobacco Use    Smoking status: Former     Current packs/day: 0.00     Average packs/day: 0.5 packs/day for 16.6 years (8.3 ttl pk-yrs)     " Types: Cigarettes     Start date: 10/1/2004     Quit date: 2021     Years since quittin.3    Smokeless tobacco: Never   Substance and Sexual Activity    Alcohol use: Not Currently    Drug use: Not Currently     Types: Marijuana, MDMA (Ecstacy)    Sexual activity: Yes     Partners: Female     Birth control/protection: None     Review of Systems   Unable to perform ROS: Intubated   Neurological:  Positive for speech difficulty and weakness.     Objective:     Vital Signs (Most Recent):  Temp: 98.7 °F (37.1 °C) (23)  Pulse: 100 (23)  Resp: (!) 23 (23)  BP: 94/64 (23 0326)  SpO2: (!) 91 % (23) Vital Signs (24h Range):  Temp:  [97.9 °F (36.6 °C)-98.7 °F (37.1 °C)] 98.7 °F (37.1 °C)  Pulse:  [] 100  Resp:  [12-30] 23  SpO2:  [77 %-100 %] 91 %  BP: ()/(62-64) 94/64  Arterial Line BP: ()/(64-87) 91/77     Weight: 96.6 kg (213 lb)  Body mass index is 26.62 kg/m².     Physical Exam  Vitals and nursing note reviewed.   Constitutional:       Comments: Intubated but awake and following commands, nodding y/n   Moving all 4 extremities L >R   Slow to follow commands but improving as time went on   HENT:      Head: Normocephalic.      Mouth/Throat:      Mouth: Mucous membranes are moist.   Eyes:      Extraocular Movements: Extraocular movements intact.      Pupils: Pupils are equal, round, and reactive to light.   Cardiovascular:      Rate and Rhythm: Normal rate.   Musculoskeletal:         General: Normal range of motion.   Neurological:      Cranial Nerves: Cranial nerves 2-12 are intact.      Deep Tendon Reflexes:      Reflex Scores:       Bicep reflexes are 2+ on the right side and 2+ on the left side.       Patellar reflexes are 2+ on the right side and 2+ on the left side.         NEUROLOGICAL EXAMINATION:     MENTAL STATUS        intubated     CRANIAL NERVES   Cranial nerves II through XII intact.     CN III, IV, VI   Pupils are equal, round, and  reactive to light.    MOTOR EXAM        Limited due to mentation but anti gravity on all 4 extremities but R sided weakness noted      REFLEXES     Reflexes   Right biceps: 2+  Left biceps: 2+  Right patellar: 2+  Left patellar: 2+    SENSORY EXAM   Light touch normal.       Significant Labs: CBC:   Recent Labs   Lab 08/31/23  1046 09/01/23  0311 09/01/23  1410   WBC 12.54 11.89 11.02   HGB 10.1* 9.4* 10.1*   HCT 31.7* 29.7* 32.6*    248 261     CMP:   Recent Labs   Lab 08/31/23  1046 08/31/23  1250 08/31/23 2011 09/01/23  0311   * 198* 142* 113*    137 140 140   K 3.6 4.0 3.4* 2.9*    100 100 99   CO2 30* 26 28 31*   BUN 8 8 8 7   CREATININE 1.1 1.1 1.1 1.0   CALCIUM 8.3* 8.2* 8.5* 8.4*   MG  --   --  1.3* 1.7   PROT 7.9 7.7  --  7.3   ALBUMIN 2.9* 2.8*  --  2.6*   BILITOT 0.7 0.7  --  0.7   ALKPHOS 101 95  --  90   AST 22 21  --  17   ALT <5* <5*  --  <5*   ANIONGAP 10 11 12 10     All pertinent lab results from the past 24 hours have been reviewed.    Significant Imaging: I have reviewed and interpreted all pertinent imaging results/findings within the past 24 hours.

## 2023-09-02 NOTE — CONSULTS
[facility] - Surgical Intensive Care  Neurology  Consult Note    Patient Name: Kevan Queen  MRN: [id number]  Admission Date: 8/31/2023  Hospital Length of Stay: 1 days  Code Status: Full Code   Attending Provider: Luca Lopez Jr.*   Consulting Provider: Radha Noe MD  Primary Care Physician: Rosio Armendariz, ROSSP  Principal Problem:Embolic stroke involving left middle cerebral artery    Inpatient consult to Neurology  Consult performed by: Radha Ríos MD  Consult ordered by: Chantal Herndon MD         Subjective:     Chief Complaint:  AMS     HPI:   37 y/o M w PMHx of DM, CHF, cardiomyopathy s/p LVAD and ICD, drug abuse, medication noncompliance, and seizures that was transferred from [place] with acute/subacute L MCA stroke for higher level care and neurology consulted for AED/seizure management after AMS event yesterday. History obtained from chart review as patient intubated and sedated by the time of initial evaluation. He initially presented with aphasia and RSW, LKN approx 8 hrs PTA. There's mention of possible seizure-like activity, described as rhythmic shaking while asleep with return to baseline and then patient woke up with the above mentioned symptoms. Of note patient reportedly had been out of coumadin x 5 days, which was finally refilled 8/30 and was given 2 pills to take that night. In OSH ED, documented to have seizure-like activity with rhythmic shaking of BUE. CTH with L parietal hypodensity with subtle areas of hyperdensity concerning for acute/subacute infarct with petechial hemorrhage, CTA negative for LVO or critical stenosis. Determined not a candidate for acute interventions with thrombolytics/thrombectomy. Patient was then transferred to [facility] [place] and while at cardiac floor he had a stoke code due to AMS and for expedited transfer to ICU for concerns of airway protection. Per noted patient was difficult to arouse and moaning. CT head unchanged from  prior and there's noted hypodensity on L parietal lobe and questionable whether infarct vs other underlying pathology; patient unable to get MRI.     Patient transferred to SICU and intubated and placed on propofol with EEG placed overnight. No seizures or seizure focus noted but L sided dysfunction correlating w CT findings.           Past Medical History:   Diagnosis Date    Acute respiratory failure with hypoxia 7/22/2023    Arthritis     Awareness alteration, transient 9/1/2022    Cardiomyopathy     CHF (congestive heart failure) 10/01/2020    COVID-19 6/3/2023    Diabetes mellitus     Dilated cardiomyopathy 10/26/2020    Drug abuse 10/2020    Headache 4/19/2023    Hyperglycemia 12/16/2022    Hyperosmolar hyperglycemic state (HHS) 5/25/2022    ICD (implantable cardioverter-defibrillator) in place 10/26/2020    Left ventricular assist device (LVAD) complication, initial encounter 6/5/2023    Muscle cramping 6/15/2022    Renal disorder     SOB (shortness of breath) 6/13/2022    Tingling in extremities 7/13/2022       Past Surgical History:   Procedure Laterality Date    APPLICATION OF WOUND VACUUM-ASSISTED CLOSURE DEVICE N/A 6/30/2022    Procedure: APPLICATION, WOUND VAC;  Surgeon: Luis F Paige MD;  Location: [facility] OR [address] [place] [address];  Service: Cardiovascular;  Laterality: N/A;  50 x 5 cm    CARDIAC DEFIBRILLATOR PLACEMENT      IMPLANTATION OF RIGHT VENTRICULAR ASSIST DEVICE (RVAD) N/A 6/29/2022    Procedure: INSERTION, RVAD;  Surgeon: Luis F Paige MD;  [facility];  Service: Cardiovascular;  Laterality: N/A;    INSERTION OF GRAFT TO PERICARDIUM Right 6/30/2022    Procedure: INSERTION-RIGHT VENTRICULAR ASSIST DEVICE;  Surgeon: Luis F Paige MD;  [facility];  Service: Cardiovascular;  Laterality: Right;    IRRIGATION OF MEDIASTINUM  6/30/2022    Procedure: IRRIGATION, MEDIASTINUM;  Surgeon: Luis F Paige MD;  [facility];  Service: Cardiovascular;;     LEFT VENTRICULAR ASSIST DEVICE Left 6/23/2022    Procedure: INSERTION-LEFT VENTRICULAR ASSIST DEVICE;  Surgeon: Luis F Paige MD;  [facility];  Service: Cardiovascular;  Laterality: Left;    LEFT VENTRICULAR ASSIST DEVICE N/A 6/29/2022    Procedure: INSERTION-LEFT VENTRICULAR ASSIST DEVICE;  Surgeon: Luis F Paige MD;  [facility];  Service: Cardiovascular;  Laterality: N/A;    RIGHT HEART CATHETERIZATION Right 4/8/2022    Procedure: INSERTION, CATHETER, RIGHT HEART;  Surgeon: Luca Lopez Jr., MD;  Location: [facility] CATH LAB;  Service: Cardiology;  Laterality: Right;    RIGHT HEART CATHETERIZATION Right 4/19/2022    Procedure: INSERTION, CATHETER, RIGHT HEART;  Surgeon: Josh Pulido MD;  Location: [facility] CATH LAB;  Service: Cardiology;  Laterality: Right;    RIGHT HEART CATHETERIZATION Right 7/21/2022    Procedure: INSERTION, CATHETER, RIGHT HEART;  Surgeon: Dalia Crum MD;  Location: [facility] CATH LAB;  Service: Cardiology;  Laterality: Right;    RIGHT HEART CATHETERIZATION Right 10/31/2022    Procedure: INSERTION, CATHETER, RIGHT HEART;  Surgeon: Dalia Crum MD;  Location: [facility] CATH LAB;  Service: Cardiology;  Laterality: Right;    STERNAL WOUND CLOSURE N/A 6/30/2022    Procedure: CLOSURE, WOUND, STERNUM;  Surgeon: Luis F Paige MD;  Location: [facility] OR [address] [place] [address];  Service: Cardiovascular;  Laterality: N/A;       Review of patient's allergies indicates:   Allergen Reactions    Bumex [bumetanide] Hives    Torsemide Hives       Current Neurological Medications: as detailed below     No current facility-administered medications on file prior to encounter.     Current Outpatient Medications on File Prior to Encounter   Medication Sig    aspirin (ECOTRIN) 81 MG EC tablet Take 1 tablet (81 mg total) by mouth once daily.    blood sugar diagnostic Strp Use to test blood glucose 4 (four) times daily.    blood-glucose meter Misc Use as instructed    busPIRone (BUSPAR)  "10 MG tablet Take 10 mg by mouth 2 (two) times daily.    dextrose 5 % (D5W) SolP 500 mL with ceFAZolin 1 gram SolR 8 g per day IV continuously    furosemide (LASIX) 80 MG tablet Take 1 tablet (80 mg total) by mouth once daily.    insulin aspart U-100 (NOVOLOG) 100 unit/mL (3 mL) InPn pen Inject 14 Units into the skin 3 (three) times daily with meals. Plus Sliding Scale: 151-200: +1, 201-250: +2, 251-300: +3, 301-350: +4 and call MD    insulin detemir U-100, Levemir, 100 unit/mL (3 mL) SubQ InPn pen Inject 24 Units into the skin 2 (two) times daily. (Patient not taking: Reported on 8/9/2023)    lancets 33 gauge Misc Use to test blood glucose 4 (four) times daily.    levETIRAcetam (KEPPRA) 1000 MG tablet Take 1 tablet (1,000 mg total) by mouth 2 (two) times daily.    milrinone 20mg/100ml D5W, 200mcg/ml, (PRIMACOR) 20 mg/100 mL (200 mcg/mL) infusion Inject 34.875 mcg/min into the vein continuous.    mirtazapine (REMERON) 15 MG tablet Take 1 tablet (15 mg total) by mouth nightly.    pen needle, diabetic 32 gauge x 5/32" Ndle Use to inject insulin 5 (five) times daily.    valsartan (DIOVAN) 40 MG tablet Take 1 tablet (40 mg total) by mouth 2 (two) times daily.    warfarin (COUMADIN) 3 MG tablet Take 1.5 tablets (4.5mg) orally daily, except 2 tablets (6mg) on Wednesdays and Saturdays    [DISCONTINUED] digoxin (LANOXIN) 125 mcg tablet Take 1 tablet (0.125 mg total) by mouth once daily.    [DISCONTINUED] EScitalopram oxalate (LEXAPRO) 5 MG Tab Take 1 tablet (5 mg total) by mouth once daily.    [DISCONTINUED] insulin (LANTUS SOLOSTAR U-100 INSULIN) glargine 100 units/mL (3mL) SubQ pen Inject 10 Units into the skin every evening. (Patient not taking: Reported on 5/24/2022)    [DISCONTINUED] metOLazone (ZAROXOLYN) 2.5 MG tablet Take 2.5 mg by mouth every other day.    [DISCONTINUED] metoprolol succinate (TOPROL-XL) 25 MG 24 hr tablet TAKE 1 TABLET(25 MG) BY MOUTH EVERY DAY    [DISCONTINUED] pantoprazole " (PROTONIX) 40 MG tablet Take 1 tablet (40 mg total) by mouth once daily.     Family History       Problem Relation (Age of Onset)    Diverticulosis Brother    Heart attack Maternal Grandmother, Maternal Grandfather    Heart failure Father          Tobacco Use    Smoking status: Former     Current packs/day: 0.00     Average packs/day: 0.5 packs/day for 16.6 years (8.3 ttl pk-yrs)     Types: Cigarettes     Start date: 10/1/2004     Quit date: 2021     Years since quittin.3    Smokeless tobacco: Never   Substance and Sexual Activity    Alcohol use: Not Currently    Drug use: Not Currently     Types: Marijuana, MDMA (Ecstacy)    Sexual activity: Yes     Partners: Female     Birth control/protection: None     Review of Systems   Unable to perform ROS: Intubated   Neurological:  Positive for speech difficulty and weakness.     Objective:     Vital Signs (Most Recent):  Temp: 98.7 °F (37.1 °C) (23)  Pulse: 100 (23)  Resp: (!) 23 (23)  BP: 94/64 (23 0326)  SpO2: (!) 91 % (23) Vital Signs (24h Range):  Temp:  [97.9 °F (36.6 °C)-98.7 °F (37.1 °C)] 98.7 °F (37.1 °C)  Pulse:  [] 100  Resp:  [12-30] 23  SpO2:  [77 %-100 %] 91 %  BP: ()/(62-64) 94/64  Arterial Line BP: ()/(64-87) 91/77     Weight: 96.6 kg (213 lb)  Body mass index is 26.62 kg/m².     Physical Exam  Vitals and nursing note reviewed.   Constitutional:       Comments: Intubated but awake and following commands, nodding y/n   Moving all 4 extremities L >R   Slow to follow commands but improving as time went on   HENT:      Head: Normocephalic.      Mouth/Throat:      Mouth: Mucous membranes are moist.   Eyes:      Extraocular Movements: Extraocular movements intact.      Pupils: Pupils are equal, round, and reactive to light.   Cardiovascular:      Rate and Rhythm: Normal rate.   Musculoskeletal:         General: Normal range of motion.   Neurological:      Cranial Nerves: Cranial  nerves 2-12 are intact.      Deep Tendon Reflexes:      Reflex Scores:       Bicep reflexes are 2+ on the right side and 2+ on the left side.       Patellar reflexes are 2+ on the right side and 2+ on the left side.         NEUROLOGICAL EXAMINATION:     MENTAL STATUS        intubated     CRANIAL NERVES   Cranial nerves II through XII intact.     CN III, IV, VI   Pupils are equal, round, and reactive to light.    MOTOR EXAM        Limited due to mentation but anti gravity on all 4 extremities but R sided weakness noted      REFLEXES     Reflexes   Right biceps: 2+  Left biceps: 2+  Right patellar: 2+  Left patellar: 2+    SENSORY EXAM   Light touch normal.       Significant Labs: CBC:   Recent Labs   Lab 08/31/23  1046 09/01/23 0311 09/01/23  1410   WBC 12.54 11.89 11.02   HGB 10.1* 9.4* 10.1*   HCT 31.7* 29.7* 32.6*    248 261     CMP:   Recent Labs   Lab 08/31/23  1046 08/31/23  1250 08/31/23 2011 09/01/23  0311   * 198* 142* 113*    137 140 140   K 3.6 4.0 3.4* 2.9*    100 100 99   CO2 30* 26 28 31*   BUN 8 8 8 7   CREATININE 1.1 1.1 1.1 1.0   CALCIUM 8.3* 8.2* 8.5* 8.4*   MG  --   --  1.3* 1.7   PROT 7.9 7.7  --  7.3   ALBUMIN 2.9* 2.8*  --  2.6*   BILITOT 0.7 0.7  --  0.7   ALKPHOS 101 95  --  90   AST 22 21  --  17   ALT <5* <5*  --  <5*   ANIONGAP 10 11 12 10     All pertinent lab results from the past 24 hours have been reviewed.    Significant Imaging: I have reviewed and interpreted all pertinent imaging results/findings within the past 24 hours.    Assessment and Plan:     Seizure-like activity  37 y/o M w PMHx of DM, CHF, cardiomyopathy s/p LVAD and ICD, drug abuse, medication noncompliance, and seizures that was transferred from [place] with acute/subacute L MCA stroke for higher level care and neurology consulted for AED/seizure management after AMS event yesterday. He initially presented to OSH ED after seizure like event at home and then noted to have aphasia and RSW and  CT head showed L parietal hypodensity w blood products. While at [facility] [place] he had episode of AMS with moaning and non responsive and stroke code called and CT stable and patient was intubated for airway protection and EEG placed while on propofol that showed L sided dysfunction but no seizures. Propofol discontinued and no discrete seizures noted and patient started on LEV 1g BID. While off sedation awake and alert and able to slowly follow commands but noted weakness no R sided.     Recommendations   -- Keep EEG overnight for close monitoring while off sedation   -- Continue LEV 1g BID and continue to hold propofol if able   -- If EEG overnight unremarkable okay to d/c EEG and continue current AEDs  -- suspect AMS could be 2/2 post ictal state vs infarct. Unclear what's patient's baseline cognitive state. No signs to suggest CNS infection at this time           VTE Risk Mitigation (From admission, onward)         Ordered     warfarin (COUMADIN) tablet 4 mg  Daily         09/01/23 1338     heparin 25,000 units in dextrose 5% 250 ml (100 units/mL) infusion MINIMAL INTENSITY nomogram - [facility]  Continuous        Question Answer Comment   Heparin Infusion Adjustment (DO NOT MODIFY ANSWER) [email or web address]  INTENSITY nomogram for [facility] [email or web address]    Begin at (in units/kg/hr) 12        09/01/23 1308     Place sequential compression device  Until discontinued         08/31/23 1452     IP VTE HIGH RISK PATIENT  Once         08/31/23 1452                Thank you for your consult. I will sign off. Please contact us if you have any additional questions.    Radha Noe MD  Neurology  [facility] - Surgical Intensive Care

## 2023-09-02 NOTE — ASSESSMENT & PLAN NOTE
Stroke risk factor  On AC with coumadin, reportedly had been out of meds x 5 days prior to presentation  Currently on heparin gtt with plans to transition to coumadin when evaluated by SLP per primary team notes.

## 2023-09-02 NOTE — ASSESSMENT & PLAN NOTE
Reported episode of seizure like activity at OSH with BUE rhythmic shaking  Per chart, currently on Keppra 1g BID at home for seizure ppx    --General neurology consulted for seizure/AED recs- recommended continuing LEV 1g BID

## 2023-09-02 NOTE — CARE UPDATE
SICU PLAN OF CARE NOTE    Dx: Embolic stroke involving left middle cerebral artery    Goals of Care: MAP > 65 and < 95, Q1H Neurovascular assessments    Vital Signs (last 12 hours):   Temp:  [98.3 °F (36.8 °C)-98.6 °F (37 °C)]   Pulse:  []   Resp:  [13-29]   BP: (78-98)/(0)   SpO2:  [95 %-100 %]   Arterial Line BP: (71-96)/(53-81)      Neuro: Follows Commands, Moves All Extremities, and Confused, slurred speech    Cardiac: HR 70-90's    LVAD HM3  Flow: 2.4-4.3  Speed: 5100  PI: 3.3-6.2  Power: 3.1-3.6  LSL: 4700  Alarms: Low flow alarm this AM when HOB raised for SLP felecia; Lasix dc'd    Respiratory: Room Air    Gtts: Milrinone, Heparin, and Continuous Ancef     Urine Output: Due to Void  1,661 cc/shift    Drains: None    Diet: Cardiac Diet     Labs/Accuchecks: Daily Labs / Accuchecks ACHS    Skin: No signs of skin breakdown or redness noted to bony prominences. Sacral and heel foam dressings in place. Waffle overlay on and inflated. [place] bed plugged in and working.    Shift Events: SLP consulted and swallow study performed-- diet order placed. Patient participated in PT/OT and up out of bed and in the chair for ~ 3 hours. Heparin gtt titrated per nomogram and Aptt checked per ordered.     Staring episode x1 this afternoon- HTS and Vascular neurology aware. EEG orders placed.    Plan of care reviewed with patient's mom and his significant other. All questions answered at this time.

## 2023-09-02 NOTE — PROCEDURES
ICU EEG/VIDEO MONITORING REPORT    DATE OF SERVICE: 9/2/23  EEG NUMBER: FH -[id number]  LOCATION OF SERVICE: ICU (10492)    METHODOLOGY   Electroencephalographic (EEG) recording is with electrodes placed according to the International 10-20 placement system.  Thirty two (32) channels of digital signal are simultaneously recorded from the scalp and may include EKG, EMG, and/or eye monitors.   Recording band pass was 0.1 to 512 hz.  Digital video recording of the patient is simultaneously recorded with the EEG.  The nursing staff report clinical symptoms and may press an event button when the patient has symptoms of clinical interest to the treating physicians.  EEG and video recording is stored and archived in digital format.  The entire recording is visually reviewed and the times identified by computer analysis as being spikes or seizures are reviewed again.  Activation procedures which include photic stimulation, hyperventilation and instructing patients to perform simple task are done in selected patients.   Compresses spectral analysis (CSA) is also performed on the activity recorded from each individual channel.  This is displayed as a power display of frequencies from 0 to 30 Hz over time.   The CSA analysis is done and displayed continuously.  This is reviewed for asymmetries in power between homologous areas of the scalp and for presence of changes in power which canbe seen when seizures occur.  Sections of suspected abnormalities on the CSA is then compared with the original EEG recording.     NERI software was also utilized in the review of this study.  This software suite analyzes the EEG recording in multiple domains.  Coherence and rhythmicity is computed to identify EEG sections which may contain organized seizures.  Each channel undergoes analysis to detect presence of spike and sharp waves which have special and morphological characteristic of epileptic activity.  The routine EEG recording is  converted from spacial into frequency domain.  This is then displayed comparing homologous areas to identify areas of significant asymmetry.  Algorithm to identify non-cortically generated artifact is used to separate eye movement, EMG and other artifact from the EEG.      Recording Times  Start on 9/1//23 at 07:00  Stop on 9/2/23 at 07:00  A total of 24 hours    This EEG study is performed in the ICU with the patient on the following sedative medications: propofol @ 50 mcg/kg/min at start, slowly weaning down during study, stopped at 13:52.    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  Patient subsequently had seizure like activity; eeg to evaluate for seizures.     State of Consciousness:   Stupor    Background:   The background is moderately disorganized, asymmetric, and continuous.    The right hemisphere displays mainly theta and alpha activity with a PDR that reaches up to 7-8 hz at maximum alertness.  The left hemisphere appears more suppressed and has mainly low amplitude delta and theta activity.             Sleep:   Transition from wakefulness to sleep is noted.    Epileptiform Abnormalities  None       EKG:   Irregular    Events:   Two push button activations are noted.  One occurs at 13:31 and it is not clear based on video review why this was done. The second occurred at 14:33 for eye deviation to the right.  However, patient was assessed during this event and was able to follow simple commands such as squeeze hand.  No epileptiform activity is noted during this event.     Impression:   This is an abnormal awake and sleep EEG indicative of a left hemispheric structural lesion and a moderate diffuse encephalopathy.  No epileptiform  discharges lateralizing features are noted.  One clinical event of eye deviation to the right occurs with no EEG correlate associated.  Compared to prior record, the degree of encephalopathy has improved.      Asiya Sharp MD  [facility]   Department of Neurology/Epilepsy

## 2023-09-02 NOTE — ASSESSMENT & PLAN NOTE
38 y.o. male with pertinent medical history of DM, CHF, cardiomyopathy s/p LVAD and ICD, drug abuse, medication noncompliance, and seizures that was transferred from [place] with acute/subacute L MCA stroke for higher level care. Initial presentation included RSW and aphasia. Patient's significant other also reported episode of seizure-like activity, described as rhythmic shaking. Of note patient reportedly had been out of coumadin x 5 days, which was finally refilled 8/30 and was given 2 pills to take that night. In OSH ED, documented to have seizure-like activity with rhythmic shaking of BUE. CTH with L parietal hypodensity with subtle areas of hyperdensity concerning for acute/subacute infarct with petechial hemorrhage, CTA negative for LVO or critical stenosis. Determined to not be a candidate for acute interventions with thrombolytics/thrombectomy. Stroke code was activated at [facility] due to AMS and for expedited transfer to ICU for concerns of airway protection. Stat CTA stable compared to prior study. He was transferred to SICU and intubated for airway protection.     9/2/2023- Patient self extubated yesterday. Also, had to be placed on sedation and restraints. Remains in upper extremity restraints but is able to move all extremities AG. Appears somewhat confused. Patient currently on heparin gtt with plan to switch to coumadin when evaluated by SLP per primary team notes. Continue RF modification with AC and statin. Unable to go for MRI 2/2 LVAD. Per discussion with staff/fellow, unlikely patient's encephalopathy is due to stroke. No additional recommendations at this time from stroke perspective. Vascular neurology will sign off. Please do not hesitate to contact us if we can be of further assistance.      Antithrombotics for secondary stroke prevention: currently on heparin gtt with plans to transition to coumadin when evaluated by SLP    Statins for secondary stroke prevention and HLD, if present: Statins:  Atorvastatin- 40 mg daily    Aggressive risk factor modification: DM, Diet, Exercise, Obesity     Rehab efforts: The patient has been evaluated by a stroke team provider and the therapy needs have been fully considered based off the presenting complaints and exam findings. The following therapy evaluations are needed: PT evaluate and treat, OT evaluate and treat, SLP evaluate and treat     Diagnostics ordered/pending: None     VTE prophylaxis: Mechanical prophylaxis: Place SCDs, currently on heparin gtt    BP parameters:SBP<180

## 2023-09-02 NOTE — PLAN OF CARE
Problem: Occupational Therapy  Goal: Occupational Therapy Goal  Description: Goals to be met by: 9/22/2023     Patient will increase functional independence with ADLs by performing:    UE Dressing with Set-up Assistance.  Grooming while standing at sink with Modified [place].  Toileting from toilet with Modified [place] for hygiene and clothing management.   Stand pivot transfers with Modified [place].  Toilet transfer to toilet with Modified [place].    Outcome: Ongoing, Progressing   Patient's goals are set.

## 2023-09-02 NOTE — PROGRESS NOTES
Notified Dr. Diaz of patient with Low alarm following raising the HOB for patient to be evaluated by SLP. Flows dropped to 2.4 and MAP ~ 60, CVP 4 on assessment and 700 cc UOP for the shift.  Patient reported no dizziness or lightheadedness.    HOB lowered and BP and Flows improved..    Updated Dr. Marques and DEYA Martinez on rounds. Plans to d/c Lasix.

## 2023-09-02 NOTE — CARE UPDATE
Called to bedside for concerns of AMS.   Patient was noted to roll his eyes backward and became unresponsive while he was spending time with family. RN attempted sternal rub without response. His Vital signs were stable. He returned to baseline after 30 seconds or so.   On my exam patient was A&O X3, no focal defects noted on neuro exam.     -- EEG ordered  -- Discussed with  vascular neurology; no other orders for now but will discuss with their team as well.

## 2023-09-02 NOTE — PROGRESS NOTES
[facility] Intensive Care  Vascular Neurology  Comprehensive Stroke Center  Progress Note    Assessment/Plan:     * Embolic stroke involving left middle cerebral artery  38 y.o. male with pertinent medical history of DM, CHF, cardiomyopathy s/p LVAD and ICD, drug abuse, medication noncompliance, and seizures that was transferred from [place] with acute/subacute L MCA stroke for higher level care. Initial presentation included RSW and aphasia. Patient's significant other also reported episode of seizure-like activity, described as rhythmic shaking. Of note patient reportedly had been out of coumadin x 5 days, which was finally refilled 8/30 and was given 2 pills to take that night. In OSH ED, documented to have seizure-like activity with rhythmic shaking of BUE. CTH with L parietal hypodensity with subtle areas of hyperdensity concerning for acute/subacute infarct with petechial hemorrhage, CTA negative for LVO or critical stenosis. Determined to not be a candidate for acute interventions with thrombolytics/thrombectomy. Stroke code was activated at [facility] [place] [facility] due to AMS and for expedited transfer to ICU for concerns of airway protection. Stat CTA stable compared to prior study. He was transferred to SICU and intubated for airway protection.     9/2/2023- Patient self extubated yesterday. Also, had to be placed on sedation and restraints. Remains in upper extremity restraints but is able to move all extremities AG. Appears somewhat confused. Patient currently on heparin gtt with plan to switch to coumadin when evaluated by SLP per primary team notes. Continue RF modification with AC and statin. Unable to go for MRI 2/2 LVAD.   Per discussion with staff/fellow, unlikely patient's encephalopathy is due to stroke. No additional recommendations at this time from stroke perspective. If further episodes concerning for seizure like activity, monitor with EEG and discuss with General Neurology. Vascular neurology will  sign off. Please do not hesitate to contact us if we can be of further assistance.      Antithrombotics for secondary stroke prevention: currently on heparin gtt with plans to transition to coumadin when evaluated by SLP    Statins for secondary stroke prevention and HLD, if present: Statins: Atorvastatin- 40 mg daily    Aggressive risk factor modification: DM, Diet, Exercise, Obesity     Rehab efforts: The patient has been evaluated by a stroke team provider and the therapy needs have been fully considered based off the presenting complaints and exam findings. The following therapy evaluations are needed: PT evaluate and treat, OT evaluate and treat, SLP evaluate and treat     Diagnostics ordered/pending: None     VTE prophylaxis: Mechanical prophylaxis: Place SCDs, currently on heparin gtt    BP parameters:SBP<180        History of substance abuse  Stroke risk factor  Utox negative on 8/31      Seizure-like activity  Reported episode of seizure like activity at OSH with BUE rhythmic shaking  Per chart, currently on Keppra 1g BID at home for seizure ppx    --General neurology consulted for seizure/AED recs- recommended continuing LEV 1g BID    LVAD (left ventricular assist device) present  Stroke risk factor  On AC with coumadin, reportedly had been out of meds x 5 days prior to presentation  Currently on heparin gtt with plans to transition to coumadin when evaluated by SLP per primary team notes.    Type 2 diabetes mellitus with hyperglycemia  Stroke risk factor  A1c 6.9  BG goal while inpatient 140-180  SSI    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  Stroke risk factor   on cessation when appropriate  Consider nicotine patch PRN and referral at discharge for smoking cessation    Acute combined systolic and diastolic congestive heart failure  Stroke risk factor  S/p LVAD and ICD  Management per primary team/HTS         8/31/23 -   9/1/23 - Intubated/sedated, Neuro exam unchanged, CTH no new or expanding  bleed. Sedation off in PM  9/2/2023 Patient self extubated yesterday. Also, had to be placed on sedation and restraints. Remains in upper extremity restraints but is able to move all extremities AG. Appears somewhat confused. Patient currently on heparin gtt with plan to switch to coumadin when evaluated by SLP per primary team notes. Continue RF modification with AC and statin. Unable to go for MRI 2/2 LVAD. No additional recommendations at this time from stroke perspective. Vascular neurology will sign off. Please do not hesitate to contact us if we can be of further assistance.      STROKE DOCUMENTATION   Acute Stroke Times   Last Known Normal Date: 08/31/23  Last Known Normal Time: 1050  Symptom Onset Date: 08/31/23  Symptom Onset Time: 1100  Stroke Team Called Date: 08/31/23  Stroke Team Called Time: 1113  CT Interpretation Time: 1145  Thrombolytic Therapy Recommended: No    NIH Scale:  1a. Level of Consciousness: 0-->Alert, keenly responsive  1b. LOC Questions: 1-->Answers one question correctly  1c. LOC Commands: 0-->Performs both tasks correctly  2. Best Gaze: 0-->Normal  3. Visual: 0-->No visual loss  4. Facial Palsy: 0-->Normal symmetrical movements  5a. Motor Arm, Left: 0-->No drift, limb holds 90 (or 45) degrees for full 10 secs  5b. Motor Arm, Right: 0-->No drift, limb holds 90 (or 45) degrees for full 10 secs  6a. Motor Leg, Left: 0-->No drift, leg holds 30 degree position for full 5 secs  6b. Motor Leg, Right: 0-->No drift, leg holds 30 degree position for full 5 secs  7. Limb Ataxia: 0-->Absent  8. Sensory: 0-->Normal, no sensory loss  9. Best Language: 0-->No aphasia, normal  10. Dysarthria: 0-->Normal  11. Extinction and Inattention (formerly Neglect): 0-->No abnormality  Total (NIH Stroke Scale): 1       Modified [place]    Titus Coma Scale:    ABCD2 Score:    [id number] Score:   HAS -BLED Score:   ICH Score:   Hunt & Arce Classification:      Hemorrhagic change of an Ischemic Stroke: Does this  patient have an ischemic stroke with hemorrhagic changes? No     Neurologic Chief Complaint:L parietal infarct    Subjective:     Interval History: Patient is seen for follow-up neurological assessment and treatment recommendations:       Patient self extubated yesterday. Also, had to be placed on sedation and restraints. Remains in upper extremity restraints but is able to move all extremities AG. Appears somewhat confused. Patient currently on heparin gtt with plan to switch to coumadin when evaluated by SLP per primary team notes. Continue RF modification with AC and statin. Unable to go for MRI 2/2 LVAD. Per discussion with staff/fellow, unlikely patient's encephalopathy is due to stroke. No additional recommendations at this time from stroke perspective. Vascular neurology will sign off. Please do not hesitate to contact us if we can be of further assistance.    HPI, Past Medical, Family, and Social History remains the same as documented in the initial encounter.     Review of Systems   Cardiovascular:  Negative for chest pain.   Gastrointestinal:  Negative for abdominal pain.   Psychiatric/Behavioral:  Positive for confusion.      Scheduled Meds:   aspirin  81 mg Oral Daily    atorvastatin  40 mg Oral Daily    ceFAZolin (ANCEF) 8 g in dextrose 5 % (D5W) 500 mL CONTINUOUS INFUSION  8 g Intravenous Q24H    insulin detemir U-100  8 Units Subcutaneous BID    levETIRAcetam (Keppra) IV (PEDS and ADULTS)  1,000 mg Intravenous Q12H    mupirocin   Nasal BID    pantoprazole  40 mg Intravenous Daily    polyethylene glycol  17 g Oral Daily    senna  8.6 mg Oral Daily    warfarin  4 mg Oral Daily     Continuous Infusions:   dexmedeTOMIDine (Precedex) infusion (titrating) Stopped (09/02/23 1000)    heparin (porcine) in D5W 15 Units/kg/hr (09/02/23 1100)    milrinone 20mg/100ml D5W (200mcg/ml) 0.25 mcg/kg/min (09/02/23 1100)    nicardipine Stopped (08/31/23 1243)     PRN Meds:sodium chloride 0.9%, dextrose 10%, dextrose 10%,  glucagon (human recombinant), insulin aspart U-100, sodium chloride 0.9%    Objective:     Vital Signs (Most Recent):  Temp: 98.3 °F (36.8 °C) (09/02/23 0745)  Pulse: 95 (09/02/23 1100)  Resp: (!) 26 (09/02/23 1100)  BP: (!) 78/0 (09/02/23 0745)  SpO2: 100 % (09/02/23 1100)  BP Location: Left arm    Vital Signs Range (Last 24H):  Temp:  [98 °F (36.7 °C)-98.7 °F (37.1 °C)]   Pulse:  []   Resp:  [11-30]   BP: (78-84)/(0)   SpO2:  [77 %-100 %]   Arterial Line BP: ()/(56-87)   BP Location: Left arm       Physical Exam  Vitals and nursing note reviewed.   Constitutional:       General: He is not in acute distress.  HENT:      Head: Normocephalic and atraumatic.   Eyes:      Conjunctiva/sclera: Conjunctivae normal.   Cardiovascular:      Rate and Rhythm: Normal rate.   Pulmonary:      Effort: Pulmonary effort is normal.   Skin:     General: Skin is dry.   Neurological:      Mental Status: He is alert.      Comments: Moves all extremities AG  No facial asymmetry              Neurological Exam:   LOC: alert  Attention Span: Good   Language: No aphasia  Articulation: No dysarthria  Facial Movement (CN VII): Symmetric facial expression    Motor: moves all extremities spontaneously  Tone: Normal tone throughout    Laboratory:  CMP:   Recent Labs   Lab 09/02/23  0304   CALCIUM 8.7   ALBUMIN 2.7*   PROT 7.6      K 3.4*   CO2 28   CL 99   BUN 7   CREATININE 0.9   ALKPHOS 97   ALT <5*   AST 19   BILITOT 1.1*     BMP:   Recent Labs   Lab 08/31/23  0536 08/31/23  1046 09/02/23  0304   GLUCOSE 227*  --   --       < > 139   K 4.0   < > 3.4*   CL  --    < > 99   CO2 29   < > 28   BUN 8.1*   < > 7   CREATININE 1.37*   < > 0.9   CALCIUM 8.3*   < > 8.7    < > = values in this interval not displayed.     CBC:   Recent Labs   Lab 09/02/23  0304   WBC 11.31   RBC 3.75*   HGB 10.1*   HCT 32.9*      MCV 88   MCH 26.9*   MCHC 30.7*     Lipid Panel:   Recent Labs   Lab 08/31/23  1634   CHOL 118*   LDLCALC 63.2  "  HDL 37*   TRIG 89     Coagulation:   Recent Labs   Lab 09/02/23  0304 09/02/23  1009   INR 1.3*  --    APTT 33.4*  33.4* 35.6*     Platelet Aggregation Study: No results for input(s): "PLTAGG", "PLTAGINTERP", "PLTAGREGLACO", "ADPPLTAGGREG" in the last 168 hours.  Hgb A1C:   Recent Labs   Lab 08/31/23  1634   HGBA1C 6.9*     TSH:   Recent Labs   Lab 08/31/23  1634   TSH 0.869       Diagnostic Results     Brain/Vessel Imaging   CTH WO 9/1/2023  Stable edema in the left parietal lobe.  Again it is unclear whether this reflects a recent infarct or infectious/inflammatory process including cerebritis.    CTA Stroke MP 8/31/2023 1145   Left parietal edema, nonspecific.  It is unclear whether this represents a recent infarct, focus of edema from demyelination or encephalitis, or other underlying lesion.  No midline shift or herniation.  Questionable very mild adjacent petechial hemorrhage with no focal hematoma.     No significant stenosis at the carotid bifurcations by NASCET criteria the vertebral arteries are patent.  No evidence of dissection.     No major branch stenosis/occlusion at the [id number].  No evidence of venous thrombosis.    CTA Stroke MP 8/31/2023 0504  No large vessel occlusion or flow-limiting stenosis.     No significant change from the [facility] interpretation.    CTH WO 8/31/2023  Findings suspicious for acute to subacute left parietal infarct with possible petechial hemorrhage.     No significant discrepancy with the preliminary report.       Cardiac Imaging   TTE 7/31/2023    LVAD: There is a Heartmate III LVAD running at 5100 RPM. The aortic valve does not open. The ventricular septum is at midline.    Left Ventricle: The left ventricle is severely dilated. Normal wall thickness. Severe global hypokinesis present. There is severely reduced systolic function with a visually estimated ejection fraction of 10 -15%. There is diastolic dysfunction.    Right Ventricle: Right ventricle was not " well visualized due to poor acoustic window.    Biatrial enlargement.    Tricuspid Valve: There is moderate transvalvular regurgitation.    Estimated PASP is 33 mmHg.    IVC/SVC: Normal venous pressure at 3 mmHg.      Monique Dodge PA-C  [facility]  Department of Vascular Neurology   [facility]

## 2023-09-02 NOTE — PT/OT/SLP EVAL
Occupational Therapy   Evaluation/Treatment    Name: Kevan Queen  MRN: [id number]  Admitting Diagnosis: Embolic stroke involving left middle cerebral artery  Recent Surgery: * No surgery found *      Recommendations:     Discharge Recommendations: other (see comments)  Discharge Equipment Recommendations:  to be determined by next level of care  Barriers to discharge:  None    Assessment:     Kevan Queen is a 38 y.o. male with a medical diagnosis of Embolic stroke involving left middle cerebral artery.  Performance deficits affecting function: weakness, impaired endurance, impaired self care skills, impaired functional mobility, gait instability, impaired balance, decreased safety awareness, decreased lower extremity function. Patient agreed to therapy and motivated to participate in therapy. VSS throughout sesison.  Patient would benefit from continued skilled acute OT 5x/wk to improve functional mobility, increase independence with ADLs, and address established goals prior to discharge.     Rehab Prognosis: Good; patient would benefit from acute skilled OT services to address these deficits and reach maximum level of function.       Plan:     Patient to be seen 5 x/week to address the above listed problems via self-care/home management, therapeutic activities, therapeutic exercises  Plan of Care Reviewed with: patient    Subjective     Chief Complaint: to get better  Patient/Family Comments/goals: patient agreed to therapy    Occupational Profile:  Living Environment: Pt does not work and lives with his significant other in 1 story slab. Patient has a tub shower combo.   Prior to admission, patients level of function was independent with ADLs and functional mobility.  Equipment used at home: none.  DME owned (not currently used): none.  Upon discharge, patient will have assistance from significant other.    Pain/Comfort:  Pain Rating 1: 0/10  Pain Rating Post-Intervention 1: 0/10    Patients cultural,  spiritual, Restorationism conflicts given the current situation: no    Objective:     Communicated with: NSG prior to session.  Patient found HOB elevated with telemetry, arterial line, melendez catheter, SCD, PICC line, LVAD upon OT entry to room.    General Precautions: Standard, fall  Orthopedic Precautions: N/A  Braces: N/A  Respiratory Status: Room air    Occupational Performance:    Bed Mobility:    Patient completed Rolling/Turning to Left with  stand by assistance  Patient completed Supine to Sit with stand by assistance    Functional Mobility/Transfers:  Patient completed Sit <> Stand Transfer with contact guard assistance  with  hand-held assist   Patient completed Bed > Chair Transfer using functional ambulation technique with contact guard assistance with hand-held assist    Activities of Daily Living:  Grooming: stand by assistance oral care and washing face EOB  Lower Body Dressing: stand by assistance Donning socks long sitting in bed . Safety with lines needed.     Cognitive/Visual Perceptual:  Cognitive/Psychosocial Skills:     -       Oriented to: Person and Place   -       Follows Commands/attention:Easily distracted and Follows one-step commands  -       Communication: clear/fluent  -       Safety awareness/insight to disability: impaired   -       Mood/Affect/Coping skills/emotional control: Appropriate to situation  Visual/Perceptual:      -Patient reports vision is different, but did not specify when trying to obtain visual impairment    Physical Exam:  Upper Extremity Range of Motion:     -       Right Upper Extremity: WFL  -       Left Upper Extremity: WFL   Strength:    -       Right Upper Extremity: WFL  -       Left Upper Extremity: WFL  Fine Motor Coordination:    -       Intact  Gross motor coordination:   WFL    AMPAC 6 Click ADL:  AMPAC Total Score: 18    Treatment & Education:  Role of OT and POC  ADL retraining  Functional mobility training  Safety  Discharge planning  Importance  EOB/OOB activity    Co-treatment performed due to patient's multiple deficits requiring two skilled therapists to appropriately and safely assess patient's strength and endurance while facilitating functional tasks in addition to accommodating for patient's activity tolerance.     Patient left up in chair with all lines intact, call button in reach, nurse aware, and all needs met.     GOALS:   Multidisciplinary Problems       Occupational Therapy Goals          Problem: Occupational Therapy    Goal Priority Disciplines Outcome Interventions   Occupational Therapy Goal     OT, PT/OT Ongoing, Progressing    Description: Goals to be met by: 9/22/2023     Patient will increase functional independence with ADLs by performing:    UE Dressing with Set-up Assistance.  Grooming while standing at sink with Modified [place].  Toileting from toilet with Modified [place] for hygiene and clothing management.   Stand pivot transfers with Modified [place].  Toilet transfer to toilet with Modified [place].                         History:     Past Medical History:   Diagnosis Date    Acute respiratory failure with hypoxia 7/22/2023    Arthritis     Awareness alteration, transient 9/1/2022    Cardiomyopathy     CHF (congestive heart failure) 10/01/2020    COVID-19 6/3/2023    Diabetes mellitus     Dilated cardiomyopathy 10/26/2020    Drug abuse 10/2020    Headache 4/19/2023    Hyperglycemia 12/16/2022    Hyperosmolar hyperglycemic state (HHS) 5/25/2022    ICD (implantable cardioverter-defibrillator) in place 10/26/2020    Left ventricular assist device (LVAD) complication, initial encounter 6/5/2023    Muscle cramping 6/15/2022    Renal disorder     SOB (shortness of breath) 6/13/2022    Tingling in extremities 7/13/2022         Past Surgical History:   Procedure Laterality Date    APPLICATION OF WOUND VACUUM-ASSISTED CLOSURE DEVICE N/A 6/30/2022    Procedure: APPLICATION, WOUND VAC;  Surgeon: Luis F Paige MD;   Location: [facility] OR [facility] FLR;  Service: Cardiovascular;  Laterality: N/A;  50 x 5 cm    CARDIAC DEFIBRILLATOR PLACEMENT      IMPLANTATION OF RIGHT VENTRICULAR ASSIST DEVICE (RVAD) N/A 6/29/2022    Procedure: INSERTION, RVAD;  Surgeon: Luis F Paige MD;  Location: [facility] OR [facility] FLR;  Service: Cardiovascular;  Laterality: N/A;    INSERTION OF GRAFT TO PERICARDIUM Right 6/30/2022    Procedure: INSERTION-RIGHT VENTRICULAR ASSIST DEVICE;  Surgeon: Luis F Paige MD;  Location: [facility] OR [facility] FLR;  Service: Cardiovascular;  Laterality: Right;    IRRIGATION OF MEDIASTINUM  6/30/2022    Procedure: IRRIGATION, MEDIASTINUM;  Surgeon: Luis F Paige MD;  [facility];  Service: Cardiovascular;;    LEFT VENTRICULAR ASSIST DEVICE Left 6/23/2022    Procedure: INSERTION-LEFT VENTRICULAR ASSIST DEVICE;  Surgeon: Luis F Paige MD;  Location: [facility] OR [facility] FLR;  Service: Cardiovascular;  Laterality: Left;    LEFT VENTRICULAR ASSIST DEVICE N/A 6/29/2022    Procedure: INSERTION-LEFT VENTRICULAR ASSIST DEVICE;  Surgeon: Luis F Paige MD;  [facility];  Service: Cardiovascular;  Laterality: N/A;    RIGHT HEART CATHETERIZATION Right 4/8/2022    Procedure: INSERTION, CATHETER, RIGHT HEART;  Surgeon: Luca Lopez Jr., MD;  Location: [facility] CATH LAB;  Service: Cardiology;  Laterality: Right;    RIGHT HEART CATHETERIZATION Right 4/19/2022    Procedure: INSERTION, CATHETER, RIGHT HEART;  Surgeon: Josh Pulido MD;  Location: [facility] CATH LAB;  Service: Cardiology;  Laterality: Right;    RIGHT HEART CATHETERIZATION Right 7/21/2022    Procedure: INSERTION, CATHETER, RIGHT HEART;  Surgeon: Dalia Crum MD;  Location: [facility] CATH LAB;  Service: Cardiology;  Laterality: Right;    RIGHT HEART CATHETERIZATION Right 10/31/2022    Procedure: INSERTION, CATHETER, RIGHT HEART;  Surgeon: Dalia Crum MD;  Location: [facility] CATH LAB;  Service: Cardiology;  Laterality: Right;    STERNAL WOUND CLOSURE N/A 6/30/2022     Procedure: CLOSURE, WOUND, STERNUM;  Surgeon: Luis F Paige MD;  Location: [facility] OR [address] [place] [address];  Service: Cardiovascular;  Laterality: N/A;       Time Tracking:     OT Date of Treatment: 09/02/23  OT Start Time: 1253  OT Stop Time: 1318  OT Total Time (min): 25 min    Billable Minutes:Evaluation 10  Self Care/Home Management 15    9/2/2023

## 2023-09-02 NOTE — ASSESSMENT & PLAN NOTE
Endocrinology consulted for BG management.   BG goal 140-180    - Levemir (Insulin Detemir) to 8 units BID  - Novolog (Insulin Aspart) prn for BG excursions [facility] SSI (150/25)  - BG checks ac/hs   - Hypoglycemia protocol in place  - If blood glucose greater than 300, please ask patient not to eat food or drink anything other than water until correctional insulin has brought it back below 250    ** Please notify Endocrine for any change and/or advance in diet**  ** Please call Endocrine for any BG related issues **    Discharge Planning:   TBD. Please notify endocrinology prior to discharge.

## 2023-09-02 NOTE — PLAN OF CARE
SICU PLAN OF CARE NOTE    Dx: Embolic stroke involving left middle cerebral artery    Goals of Care: d/c EEG, diuresis, PT/OT, neuro checks    Vital Signs (last 12 hours):   Temp:  [98 °F (36.7 °C)-98.7 °F (37.1 °C)]   Pulse:  []   Resp:  [11-29]   SpO2:  [91 %-100 %]   Arterial Line BP: ()/(70-84)      Neuro: Sedated, Arouses to Voice, Moves All Extremities, and Confused    Cardiac: NSR 70s-100    Respiratory: Room Air    Gtts: Milrinone, Precedex, and Heparin    Urine Output: Urinary Catheter 2880 cc/shift    Drains: none    Diet: NPO        LVAD   Speed 5100   Flows 3.3-4.2   PI        3.5-6.5   Power 3.3-3.5     Labs/Accuchecks: daily/accu q4h    Skin: LVAD dressing CDI, foams to sacrum and heels, waffle mattress in place and inflated properly, pt weigh shifted q2h    Shift Events: precedex started and restraints placed to both wrists for agitation, otherwise NAEON

## 2023-09-02 NOTE — PROGRESS NOTES
09/02/2023  Miracle Caballero    Current provider:  Luca Lopez Jr.*    Device interrogation:      9/2/2023     1:00 PM 9/2/2023    12:00 PM 9/2/2023    11:00 AM 9/2/2023    10:00 AM 9/2/2023     9:00 AM 9/2/2023     8:13 AM 9/2/2023     8:00 AM   TXP LVAD INTERROGATIONS   Type HeartMate3 HeartMate3 HeartMate3 HeartMate3 HeartMate3 HeartMate3 HeartMate3   Flow 4 3.8 4 3.7 3.3 2.4 3.4   Speed 5100 5100 5100 5100 5100 5150 5100   PI 6.2 5.6 5.5 5.5 4.2 3.3 5   Power (Serna) 3.6 3.6 3.5 3.5 3.6 3.1 3.4   LSL 4700 4700 4700 4700 4700 4700 4700   Pulsatility Intermittent pulse Intermittent pulse Intermittent pulse Intermittent pulse Intermittent pulse Intermittent pulse Intermittent pulse          Rounded on Kevan Queen to ensure all mechanical assist device settings (IABP or VAD) were appropriate and all parameters were within limits.  I was able to ensure all back up equipment was present, the staff had no issues, and the Perfusion Department daily rounding was complete.      For implantable VADs: Interrogation of Ventricular assist device was performed with analysis of device parameters and review of device function. I have personally reviewed the interrogation findings and agree with findings as stated.     In emergency, the nursing units have been notified to contact the perfusion department either by:  Calling [phone] from 630am to 4pm Mon thru Fri, utilizing the On-Call Finder functionality of Epic and searching for Perfusion, or by contacting the hospital  from 4pm to 630am and on weekends and asking to speak with the perfusionist on call.    1:32 PM

## 2023-09-02 NOTE — PROGRESS NOTES
"Dr. Herndon called to bedside following patient acute agitation. RN entered room to silence alarm on bedside monitor, pt appeared calm and asleep on entrance to room. RN was wearing yellow contact gown on entrance to room. While silencing bedside alarm, pt sat straight up in bed and said "guess what", RN responded "what?" At this time, pt reached for RN neck and throat with L hand and grabbed hold of yellow contact gown. RN was able to break free by allowing yellow gown to rip. RN immediately exited room, charge RN notified and security promptly notified and to bedside. At this time, male RN on unit assumed care. All vital signs remained stable, pt intermittently oriented x1 only. HTS team aware at @ bedside, plan discussed with security and charge RN present.   "

## 2023-09-02 NOTE — PT/OT/SLP EVAL
Speech Language Pathology Evaluation  Bedside Swallow    Patient Name:  Kevan Queen   MRN:  [id number]  [id number]    Admitting Diagnosis: Embolic stroke involving left middle cerebral artery    Recommendations:                 General Recommendations:  Speech language evaluation and Cognitive-linguistic evaluation  Diet recommendations:  Regular, Thin   Aspiration Precautions: Standard aspiration precautions   General Precautions: Standard, aspiration, fall  Communication strategies:  none    Assessment:     Kevan Queen is a 38 y.o. male with adequate tolerance of regular solids and thin liquids. ST will follow up to complete a Wppcoa-Hlqvdctr-Qfgfkuoxp Evaluation and determine further ST needs.     History:     Past Medical History:   Diagnosis Date    Acute respiratory failure with hypoxia 7/22/2023    Arthritis     Awareness alteration, transient 9/1/2022    Cardiomyopathy     CHF (congestive heart failure) 10/01/2020    COVID-19 6/3/2023    Diabetes mellitus     Dilated cardiomyopathy 10/26/2020    Drug abuse 10/2020    Headache 4/19/2023    Hyperglycemia 12/16/2022    Hyperosmolar hyperglycemic state (HHS) 5/25/2022    ICD (implantable cardioverter-defibrillator) in place 10/26/2020    Left ventricular assist device (LVAD) complication, initial encounter 6/5/2023    Muscle cramping 6/15/2022    Renal disorder     SOB (shortness of breath) 6/13/2022    Tingling in extremities 7/13/2022       Past Surgical History:   Procedure Laterality Date    APPLICATION OF WOUND VACUUM-ASSISTED CLOSURE DEVICE N/A 6/30/2022    Procedure: APPLICATION, WOUND VAC;  Surgeon: Luis F Paige MD;  Location: [address];  Service: Cardiovascular;  Laterality: N/A;  50 x 5 cm    CARDIAC DEFIBRILLATOR PLACEMENT      IMPLANTATION OF RIGHT VENTRICULAR ASSIST DEVICE (RVAD) N/A 6/29/2022    Procedure: INSERTION, RVAD;  Surgeon: Luis F Paige MD;  Location: [facility] OR [address] [place] [address];  Service: Cardiovascular;  Laterality: N/A;    INSERTION  OF GRAFT TO PERICARDIUM Right 6/30/2022    Procedure: INSERTION-RIGHT VENTRICULAR ASSIST DEVICE;  Surgeon: Luis F Paige MD;  Location: [facility] OR [facility] FLR;  Service: Cardiovascular;  Laterality: Right;    IRRIGATION OF MEDIASTINUM  6/30/2022    Procedure: IRRIGATION, MEDIASTINUM;  Surgeon: Luis F Paige MD;  Location: [facility] OR [facility] FLR;  Service: Cardiovascular;;    LEFT VENTRICULAR ASSIST DEVICE Left 6/23/2022    Procedure: INSERTION-LEFT VENTRICULAR ASSIST DEVICE;  Surgeon: Luis F Paige MD;  Location: [facility] OR [facility] FLR;  Service: Cardiovascular;  Laterality: Left;    LEFT VENTRICULAR ASSIST DEVICE N/A 6/29/2022    Procedure: INSERTION-LEFT VENTRICULAR ASSIST DEVICE;  Surgeon: Luis F Paieg MD;  Location: [facility] OR [facility] FLR;  Service: Cardiovascular;  Laterality: N/A;    RIGHT HEART CATHETERIZATION Right 4/8/2022    Procedure: INSERTION, CATHETER, RIGHT HEART;  Surgeon: Luca Lopez Jr., MD;  Location: [facility] CATH LAB;  Service: Cardiology;  Laterality: Right;    RIGHT HEART CATHETERIZATION Right 4/19/2022    Procedure: INSERTION, CATHETER, RIGHT HEART;  Surgeon: Josh Pulido MD;  Location: [facility] CATH LAB;  Service: Cardiology;  Laterality: Right;    RIGHT HEART CATHETERIZATION Right 7/21/2022    Procedure: INSERTION, CATHETER, RIGHT HEART;  Surgeon: Dalia Crum MD;  Location: [facility] CATH LAB;  Service: Cardiology;  Laterality: Right;    RIGHT HEART CATHETERIZATION Right 10/31/2022    Procedure: INSERTION, CATHETER, RIGHT HEART;  Surgeon: Dalia Crum MD;  Location: [facility] CATH LAB;  Service: Cardiology;  Laterality: Right;    STERNAL WOUND CLOSURE N/A 6/30/2022    Procedure: CLOSURE, WOUND, STERNUM;  Surgeon: Luis F Paige MD;  [facility];  Service: Cardiovascular;  Laterality: N/A;     MD note 9/1: HPI: 37 y/o M w PMHx of DM, CHF, cardiomyopathy s/p LVAD and ICD, drug abuse, medication noncompliance, and seizures that was transferred from [place] with acute/subacute L MCA stroke for  higher level care and neurology consulted for AED/seizure management after AMS event yesterday. History obtained from chart review as patient intubated and sedated by the time of initial evaluation. He initially presented with aphasia and RSW, LKN approx 8 hrs PTA. There's mention of possible seizure-like activity, described as rhythmic shaking while asleep with return to baseline and then patient woke up with the above mentioned symptoms. Of note patient reportedly had been out of coumadin x 5 days, which was finally refilled 8/30 and was given 2 pills to take that night. In OSH ED, documented to have seizure-like activity with rhythmic shaking of BUE. CTH with L parietal hypodensity with subtle areas of hyperdensity concerning for acute/subacute infarct with petechial hemorrhage, CTA negative for LVO or critical stenosis. Determined not a candidate for acute interventions with thrombolytics/thrombectomy. Patient was then transferred to [facility] on 8/31 and while at cardiac floor he had a stoke code due to AMS and for expedited transfer to ICU for concerns of airway protection. Per noted patient was difficult to arouse and moaning. CT head unchanged from prior and there's noted hypodensity on L parietal lobe and questionable whether infarct vs other underlying pathology; patient unable to get MRI.   Patient transferred to SICU and intubated and placed on propofol with EEG placed overnight. No seizures or seizure focus noted but L sided dysfunction correlating w CT findings.     Head CT: Stable edema in the left parietal lobe.  Again it is unclear whether this reflects a recent infarct or infectious/inflammatory process including cerebritis.    Chest X-Rays: Postoperative changes and support devices as before.  Cardiomegaly.  Opacification at the left lung base probably underlying pleural effusion or combination of volume loss.  The right lung is clear.  No extrapulmonary air identified.    Prior diet:  "reg/thin    Subjective     Patient seen across two attempts. Upon initial attempt, patient with low flow alarms with HOB elevation. RN present in room and evaluation held. SLP returned and patient with significantly improved SUJIT.   Patient goals: "Water."    Objective:     Oral Musculature Evaluation  Oral Musculature: WFL  Dentition: scattered dentition  Mucosal Quality: adequate  Mandibular Strength and Mobility: WFL  Oral Labial Strength and Mobility: WFL  Lingual Strength and Mobility: WFL, other (see comments) (tongue piercing)  Volitional Cough: elicited  Volitional Swallow: timely  Voice Prior to PO Intake: clear    Bedside Swallow Eval:   Consistencies Assessed:  Thin liquid sips of water via cup and straw x24 ounces (patient assessed with consecutive, self regulated straw sips)  Puree tsp bites of apple sauce (whole container)  Solids bites of hector rebolledo   RN administered whole po medications     Oral Phase:   WFL     Pharyngeal Phase:   no overt clinical signs/symptoms of aspiration  no overt clinical signs/symptoms of pharyngeal dysphagia     Compensatory Strategies  None     Treatment: SLP provided patient education on SLP role, s/s and risks of aspiraiton, safe swallow precautions, and POC. Patient v/u of all discussed.    Goals:   Multidisciplinary Problems       SLP Goals          Problem: SLP    Goal Priority Disciplines Outcome   SLP Goal     SLP Ongoing, Progressing   Description: Short Term Goals:   1. Pt will participate in a speech, language, and cognitive evaluation with possible updated goals to follow pending results.                         Plan:     Patient to be seen:  4 x/week   Plan of Care expires:     Plan of Care reviewed with:  patient   SLP Follow-Up:  Yes       Discharge recommendations:   (tbd)   Barriers to Discharge:  None    Time Tracking:     SLP Treatment Date:   09/02/23  Speech Start Time:  1122  Speech Stop Time:  1135     Speech Total Time (min):  13 min    Billable " Minutes: Eval Swallow and Oral Function 13    09/02/2023

## 2023-09-02 NOTE — PLAN OF CARE
Problem: SLP  Goal: SLP Goal  Description: Short Term Goals:   1. Pt will participate in a speech, language, and cognitive evaluation with possible updated goals to follow pending results.    Outcome: Ongoing, Progressing   Bedside Swallow Study completed. Recommend: regular diet, thin liquids, standard aspiration precautions. ST will continue to follow to complete a Yowswy-Pgebnaad-Qlyuzlntn Evaluation and assess tolerance of diet.

## 2023-09-02 NOTE — PROGRESS NOTES
[facility] - Surgical Intensive Care  Heart Transplant  Progress Note    Patient Name: Kevan Queen  MRN: [id number]  Admission Date: 8/31/2023  Hospital Length of Stay: 2 days  Attending Physician: Luca Lopez Jr.*  Primary Care Provider: Rosio Armendariz FNP  Principal Problem:Embolic stroke involving left middle cerebral artery    Subjective:     Interval History: Patient self extubated yesterday but remained stable overnight.  EEG in place overnight per Neurology recommendations.  He remains on Keppra 1g BID.  This morning: he had a LFA while sitting and working with speech therapy.  He is net negative 3L in the last 24 hours.  CVP: 6.  Will stop IV Lasix today and monitor. Can plan to restart po tomorrow if remains stable. Urine tox screen ordered as per vascular neurology consult.    Lines:  PICC: 8/1  A-line 8/31    Continuous Infusions:   dexmedeTOMIDine (Precedex) infusion (titrating) 0.1 mcg/kg/hr (09/02/23 0900)    heparin (porcine) in D5W 14 Units/kg/hr (09/02/23 0900)    milrinone 20mg/100ml D5W (200mcg/ml) 0.25 mcg/kg/min (09/02/23 0900)    nicardipine Stopped (08/31/23 1243)     Scheduled Meds:   aspirin  81 mg Oral Daily    atorvastatin  40 mg Oral Daily    ceFAZolin (ANCEF) 8 g in dextrose 5 % (D5W) 500 mL CONTINUOUS INFUSION  8 g Intravenous Q24H    insulin detemir U-100  8 Units Subcutaneous BID    levETIRAcetam (Keppra) IV (PEDS and ADULTS)  1,000 mg Intravenous Q12H    magnesium sulfate IVPB  2 g Intravenous Once    mupirocin   Nasal BID    pantoprazole  40 mg Intravenous Daily    polyethylene glycol  17 g Oral Daily    senna  8.6 mg Oral Daily    warfarin  4 mg Oral Daily     PRN Meds:sodium chloride 0.9%, dextrose 10%, dextrose 10%, fentaNYL, glucagon (human recombinant), insulin aspart U-100, sodium chloride 0.9%    Review of patient's allergies indicates:   Allergen Reactions    Bumex [bumetanide] Hives    Torsemide Hives     Objective:     Vital Signs (Most  Recent):  Temp: 98.3 °F (36.8 °C) (09/02/23 0745)  Pulse: 88 (09/02/23 0915)  Resp: 20 (09/02/23 0915)  BP: (!) 78/0 (09/02/23 0745)  SpO2: 97 % (09/02/23 0915) Vital Signs (24h Range):  Temp:  [98 °F (36.7 °C)-98.7 °F (37.1 °C)] 98.3 °F (36.8 °C)  Pulse:  [] 88  Resp:  [11-30] 20  SpO2:  [77 %-100 %] 97 %  BP: (78-84)/(0) 78/0  Arterial Line BP: ()/(56-87) 85/65     Patient Vitals for the past 72 hrs (Last 3 readings):   Weight   09/02/23 0500 90.7 kg (200 lb)   09/01/23 0305 96.6 kg (213 lb)   08/31/23 1017 95.5 kg (210 lb 8.6 oz)       Body mass index is 25 kg/m².      Intake/Output Summary (Last 24 hours) at 9/2/2023 0954  Last data filed at 9/2/2023 0900  Gross per 24 hour   Intake 695.13 ml   Output 4500 ml   Net -3804.87 ml         Hemodynamic Parameters:              Physical Exam  Constitutional:       Appearance: Normal appearance.      Comments: awake and responding appropriately.  EEG in place   HENT:      Head: Normocephalic and atraumatic.   Eyes:      Conjunctiva/sclera: Conjunctivae normal.   Neck:      Vascular: No JVD.      Comments: No JVP elevation   Cardiovascular:      Comments: VAD hum appreciated  Pulmonary:      Breath sounds: Normal breath sounds.      Comments: Clear to auscultation  Abdominal:      Comments: Soft, non-tender, non-distended   Musculoskeletal:      Cervical back: Normal range of motion.      Comments: No peripheral edema   Skin:     General: Skin is warm and dry.      Capillary Refill: Capillary refill takes less than 2 seconds.   Neurological:      Mental Status: He is oriented to person, place, and time.            Significant Labs:  CBC:  Recent Labs   Lab 09/01/23  0311 09/01/23  1410 09/02/23  0304   WBC 11.89 11.02 11.31   RBC 3.40* 3.74* 3.75*   HGB 9.4* 10.1* 10.1*   HCT 29.7* 32.6* 32.9*    261 279   MCV 87 87 88   MCH 27.6 27.0 26.9*   MCHC 31.6* 31.0* 30.7*       BNP:  Recent Labs   Lab 08/31/23  1046   *       CMP:  Recent Labs   Lab  08/31/23  1250 08/31/23 2011 09/01/23 0311 09/02/23  0304   * 142* 113* 150*   CALCIUM 8.2* 8.5* 8.4* 8.7   ALBUMIN 2.8*  --  2.6* 2.7*   PROT 7.7  --  7.3 7.6    140 140 139   K 4.0 3.4* 2.9* 3.4*   CO2 26 28 31* 28    100 99 99   BUN 8 8 7 7   CREATININE 1.1 1.1 1.0 0.9   ALKPHOS 95  --  90 97   ALT <5*  --  <5* <5*   AST 21  --  17 19   BILITOT 0.7  --  0.7 1.1*        Coagulation:   Recent Labs   Lab 09/01/23 0311 09/01/23 1410 09/01/23 2125 09/02/23  0304   INR 1.3* 1.2  --  1.3*   APTT 25.9 26.2 32.8* 33.4*  33.4*       LDH:  Recent Labs   Lab 09/01/23 0311 09/02/23  0304   * 368*       Microbiology:  Microbiology Results (last 7 days)       ** No results found for the last 168 hours. **            I have reviewed all pertinent labs within the past 24 hours.    Estimated Creatinine Clearance: 133 mL/min (based on SCr of 0.9 mg/dL).    Diagnostic Results:  I have reviewed all pertinent imaging results/findings within the past 24 hours.    Assessment and Plan:     Mr. Kevan Thacker is a 39 y/o AAM w/ PMH of NICM (possible Familial w/ father having LVAD and subsequent transplant) s/p DT-HM3 (6/3/2022), HFrEF, chronic DLES, MSSA bacteremia c/b L empyema (maintained on ancef, maria isabel 9/14), seizures, and IDDM2 who initially presented to [facility] with aphasia and right sided weekness. He was reportedly not taking his coumadin or keppra for the past week due to running out. As per chart review, patient started to experience symptoms around 1am when he went to sleep. Symptoms worsened and EMS was called around 3:30am. Upon admission patient underwent a CT head showed an acute to subacute infarct with possible petechial hemorrhage. Labs were significant for INR of 1.2. Patient was loaded with Keppra and transferred to [facility] for further care.      Upon arrival, patient was found to be hemodynamically stable w/ MAPs of 106. However, he was noted to be not alert or oriented and  extremely lethargic. Patient was noted to have GCS 10-11 with minimally reactive pupils. Patient underwent stat CT brain which showed no acute changes since prior CT, and transferred to the ICU for closer monitoring. Once in the ICU patient was intubated for airway protection.      Of note, patient was most recently hospitalized from 7/22/2023-8/8/2023 for sepsis/covod. Found to have loculated pleural effussion requiring chest tube placement and three doses of lytics. Effussion improved and patient was discharged with 6 week course of Ancef (to end on 9/14).        Home Medications:    Aspirin 81mg daily   Buspirone 10mg bid   Furosemide 80mg daily   Insulin detemir 25 units bid   Insulin aspart 14 units w/ meals   Milrinone 0.25   Mirtazapine 15mg nightly   Valsartan 40mg bi   Warfarin      Cardiac Imaging:    Echo (7/31/2023): HM3 at 5100. AV does not open. IV septum midline. EF 10-15%. LV severely dilated. Normal wall thickness. Severe global hypokinesis. RV not well visualized due to poor acoustic window. Biatrial enlargment. Mod TR.        * Embolic stroke involving left middle cerebral artery  - CTH with L parietal hypodensity with subtle areas of hyperdensity concerning for acute/subacute infarct with petechial hemorrhage, CTA negative for LVO or critical stenosis. Determined not a candidate for acute interventions with thrombolytics/thrombectomy.  - As per vascular neuro, continue aspirin and statin. Repeat CT unremarkable so gave clearance to resume anticoagulation.  -On heparin.  Will resume Coumadin once cleared by speech   - PT, OT, Speech consulted.     LVAD (left ventricular assist device) present  -HeartMate 3 Implanted on 6/29/2022 as DT with RV failure on milrinone at 0.25 mcg/kg/min.  -Coumadin held due to concern for petechial hemorrhage. Repeat CT stable so started back on heparin.  Goal INR 2.0-3.0. will resume coumadin once patient passes swallow study   -LDH is stable.  Will continue to  monitor daily  -Speed set at 5100, LSL 4700 rpm  -Echo: 7/31/23 EF: 10-15%, LVEDD: 6.87 cm.  No vegation appreciated     Procedure: Device Interrogation Including analysis of device parameters  Current Settings: Ventricular Assist Device  Review of device function is stable        9/1/2023     1:00 PM 9/1/2023    12:00 PM 9/1/2023    11:00 AM 9/1/2023    10:00 AM 9/1/2023     9:00 AM 9/1/2023     8:00 AM 9/1/2023     7:00 AM   TXP LVAD INTERROGATIONS   Type HeartMate3 HeartMate3 HeartMate3 HeartMate3 HeartMate3 HeartMate3 HeartMate3   Flow 4.2 4 4.2 4.2 4.2 4.1 4.2   Speed 5100 5100 5100 5100 5100 5100 5100   PI 5.2 5.5 4.4 4.5 4.4 4.6 4.3   Power (Serna) 3.5 3.5 3.5 3.5 3.5 3.5 3.5   LSL 4700 4700 4700 4700 4700 4700 4700   Pulsatility  Intermittent pulse Intermittent pulse Intermittent pulse Intermittent pulse Intermittent pulse        Acute combined systolic and diastolic congestive heart failure  -NICM s/p LVAD and on Milrinone   - Last Echo (7/31/2023): HM3 at 5100. AV does not open. IV septum midline. EF 10-15%. LV severely dilated. Normal wall thickness. Severe global hypokinesis. RV not well visualized due to poor acoustic window. Biatrial enlargment. Mod TR.    -diuresed on IV Lasix with good response.  He is net negative 6.8L since admission.  CVP: 6 and he had LFA this am.  Will stop IV Lasix and monitor response.   -Will get echo repeat echo given LFA  - Home GDMT: valsartan 40mg bid  - Home diuretic regimen: Lasix 80mg daily.   - Ionotropes: continue home milrinone 0.25.   - Strict I&Os and daily weights.   - Maintain K>4 and Mg>2    Seizure-like activity  - loaded with keppra in ED prior to arrival.   - EEG unremarkable, but remained on overnight. Will continue keppra 1000mg bid as per Neuro recs   - Neurology consulted. Appreciate recommendations.     Bacteremia due to Staphylococcus  -H/O chronic MSSA DLES infection for which he is on Doxycycline at home  -Blood cxs here +ve for MSSA through 7/28.  Repeated 7/29 with NGTD. Repeated again 7/30 NGTD  -PICC line replaced 8/1  -ECHO done 7/31 and no vegetations appreciated   -CT 8/1 with loculated fluid along Left lateral hear border, thoracic surgery consulted, not a candidate for VATS. Chest tube placed with IR 8/3, pleural fluid cultures positive for GPC  -Continue Ancef 8g IV q 24 hours for total of 6 weeks with end date 9/14 (6 weeks after chest tube placement).     Type 2 diabetes mellitus with hyperglycemia  - management as per endocrinology.     On mechanically assisted ventilation  -resolved  -Patient self extubated 9/1  -Mechanically ventillatied for airway protection in the setting of AMS w/ stroke.   - Pulmonology consulted to assist with management. Appreciate their assistance     Uninterrupted Critical Care/Counseling Time (not including procedures): 60 minutes      DEYA Martinez  Heart Transplant  [facility] - [facility] Int[facility]

## 2023-09-02 NOTE — PT/OT/SLP EVAL
Physical Therapy  Co-Evaluation and treatment with OT    Patient Name:  Kevan Queen   MRN:  [id number]    Recommendations:     Discharge Recommendations: other (see comments)   Discharge Equipment Recommendations:  (will determine DME needs closer to discharge)   Barriers to discharge: None    Assessment:     Kevan Queen is a 38 y.o. male admitted with a medical diagnosis of Embolic stroke involving left middle cerebral artery.  He presents with the following impairments/functional limitations: impaired balance, weakness, decreased safety awareness, impaired endurance, impaired functional mobility, gait instability, decreased lower extremity function pt tolerated treatment well and will benefit from skilled PT 5x/wk to progress physically. Pt should be able to discharge home with further PT needs when medically stable. Pt presented to ED with AMS, aphasia and R sided weakness. Pt had LVAD placement 6/2022.    Rehab Prognosis: Good; patient would benefit from acute skilled PT services to address these deficits and reach maximum level of function.    Recent Surgery: * No surgery found *      Plan:     During this hospitalization, patient to be seen 5 x/week to address the identified rehab impairments via gait training, therapeutic activities and progress toward the following goals:    Plan of Care Expires:  09/30/23    Subjective     Chief Complaint: pt stated that he wanted to walk,   Patient/Family Comments/goals:  to get stronger and go home.   Pain/Comfort:  Pain Rating 1: 0/10  Pain Rating Post-Intervention 1: 0/10    Patients cultural, spiritual, Uatsdin conflicts given the current situation: no    Living Environment:  Pt does not work and lives with his significant other in 1 story slab.   Prior to admission, patients level of function was independent .  Equipment used at home: none.  DME owned (not currently used): none.  Upon discharge, patient will have assistance from significant other.  .    Objective:     Communicated with nurse  prior to session.  Patient found supine with telemetry, arterial line, melendez catheter, SCD, PICC line, LVAD  upon PT entry to room.    General Precautions: Standard, fall  Orthopedic Precautions:    Braces:    Respiratory Status: Room air    Exams:  Cognitive Exam:  Patient is oriented to Person and Place  RLE ROM: WFL  RLE Strength: WFL  LLE ROM: WFL  LLE Strength: WFL    Functional Mobility:  Bed Mobility:  pt needed verbal cues for hand placement and sequencing for functional mobility.  Rolling Left:  stand by assistance  Supine to Sit: stand by assistance    Transfers:     Sit to Stand:  contact guard assistance with hand-held assist    Gait: pt received gait training ~ 4 stpe with CGA.     Balance: pt sat on EOB and performed ADLS with OT with SBA. Pt was CGA static standing balance.       AM-PAC 6 CLICK MOBILITY  Total Score:16       Treatment & Education:  Pt received verbal instructions in role of PT and POC. Pt verbally expressed understanding of such.     Patient left up in chair with all lines intact, call button in reach, and RN  present. Pt SCD in place.     GOALS:   Multidisciplinary Problems       Physical Therapy Goals          Problem: Physical Therapy    Goal Priority Disciplines Outcome Goal Variances Interventions   Physical Therapy Goal     PT, PT/OT Ongoing, Progressing     Description: Goals to be met by: 23     Patient will increase functional independence with mobility by performin. Supine to sit with Modified [place]  2. Sit to stand transfer with Supervision  3. Gait  x 250 feet with Stand-by Assistance using AD if needed.   4. Pt transfer bed to bedside chair with SBA. .                          History:     Past Medical History:   Diagnosis Date    Acute respiratory failure with hypoxia 2023    Arthritis     Awareness alteration, transient 2022    Cardiomyopathy     CHF (congestive heart failure) 10/01/2020     COVID-19 6/3/2023    Diabetes mellitus     Dilated cardiomyopathy 10/26/2020    Drug abuse 10/2020    Headache 4/19/2023    Hyperglycemia 12/16/2022    Hyperosmolar hyperglycemic state (HHS) 5/25/2022    ICD (implantable cardioverter-defibrillator) in place 10/26/2020    Left ventricular assist device (LVAD) complication, initial encounter 6/5/2023    Muscle cramping 6/15/2022    Renal disorder     SOB (shortness of breath) 6/13/2022    Tingling in extremities 7/13/2022       Past Surgical History:   Procedure Laterality Date    APPLICATION OF WOUND VACUUM-ASSISTED CLOSURE DEVICE N/A 6/30/2022    Procedure: APPLICATION, WOUND VAC;  Surgeon: Luis F Paige MD;  [facility];  Service: Cardiovascular;  Laterality: N/A;  50 x 5 cm    CARDIAC DEFIBRILLATOR PLACEMENT      IMPLANTATION OF RIGHT VENTRICULAR ASSIST DEVICE (RVAD) N/A 6/29/2022    Procedure: INSERTION, RVAD;  Surgeon: Luis F Paige MD;  [facility];  Service: Cardiovascular;  Laterality: N/A;    INSERTION OF GRAFT TO PERICARDIUM Right 6/30/2022    Procedure: INSERTION-RIGHT VENTRICULAR ASSIST DEVICE;  Surgeon: Luis F Paige MD;  [facility];  Service: Cardiovascular;  Laterality: Right;    IRRIGATION OF MEDIASTINUM  6/30/2022    Procedure: IRRIGATION, MEDIASTINUM;  Surgeon: Luis F Paige MD;  [facility];  Service: Cardiovascular;;    LEFT VENTRICULAR ASSIST DEVICE Left 6/23/2022    Procedure: INSERTION-LEFT VENTRICULAR ASSIST DEVICE;  Surgeon: Luis F Paige MD;  [facility];  Service: Cardiovascular;  Laterality: Left;    LEFT VENTRICULAR ASSIST DEVICE N/A 6/29/2022    Procedure: INSERTION-LEFT VENTRICULAR ASSIST DEVICE;  Surgeon: Luis F Paige MD;  [facility];  Service: Cardiovascular;  Laterality: N/A;    RIGHT HEART CATHETERIZATION Right 4/8/2022    Procedure: INSERTION, CATHETER, RIGHT HEART;  Surgeon: Luca Lopez Jr., MD;  Location: [facility] CATH LAB;  Service:  Cardiology;  Laterality: Right;    RIGHT HEART CATHETERIZATION Right 4/19/2022    Procedure: INSERTION, CATHETER, RIGHT HEART;  Surgeon: Josh Pulido MD;  Location: [facility] CATH LAB;  Service: Cardiology;  Laterality: Right;    RIGHT HEART CATHETERIZATION Right 7/21/2022    Procedure: INSERTION, CATHETER, RIGHT HEART;  Surgeon: Dalia Crum MD;  Location: [facility] CATH LAB;  Service: Cardiology;  Laterality: Right;    RIGHT HEART CATHETERIZATION Right 10/31/2022    Procedure: INSERTION, CATHETER, RIGHT HEART;  Surgeon: Dalia Crum MD;  Location: [facility] CATH LAB;  Service: Cardiology;  Laterality: Right;    STERNAL WOUND CLOSURE N/A 6/30/2022    Procedure: CLOSURE, WOUND, STERNUM;  Surgeon: Luis F Paige MD;  Location: [facility] OR [address] [place] [address];  Service: Cardiovascular;  Laterality: N/A;       Time Tracking:     PT Received On: 09/02/23  PT Start Time: 1253     PT Stop Time: 1318  PT Total Time (min): 25 min     Billable Minutes: Evaluation 10 min  and Therapeutic Activity 15 min       09/02/2023

## 2023-09-02 NOTE — ASSESSMENT & PLAN NOTE
-resolved  -Patient self extubated 9/1  -Mechanically ventillatied for airway protection in the setting of AMS w/ stroke.   - Pulmonology consulted to assist with management. Appreciate their assistance

## 2023-09-02 NOTE — HPI
39 y/o M w PMHx of DM, CHF, cardiomyopathy s/p LVAD and ICD, drug abuse, medication noncompliance, and seizures that was transferred from [place] with acute/subacute L MCA stroke for higher level care and neurology consulted for AED/seizure management after AMS event yesterday. History obtained from chart review as patient intubated and sedated by the time of initial evaluation. He initially presented with aphasia and RSW, LKN approx 8 hrs PTA. There's mention of possible seizure-like activity, described as rhythmic shaking while asleep with return to baseline and then patient woke up with the above mentioned symptoms. Of note patient reportedly had been out of coumadin x 5 days, which was finally refilled 8/30 and was given 2 pills to take that night. In OSH ED, documented to have seizure-like activity with rhythmic shaking of BUE. CTH with L parietal hypodensity with subtle areas of hyperdensity concerning for acute/subacute infarct with petechial hemorrhage, CTA negative for LVO or critical stenosis. Determined not a candidate for acute interventions with thrombolytics/thrombectomy. Patient was then transferred to [facility] [place] [place] on 8/31 and while at cardiac floor he had a stoke code due to AMS and for expedited transfer to ICU for concerns of airway protection. Per noted patient was difficult to arouse and moaning. CT head unchanged from prior and there's noted hypodensity on L parietal lobe and questionable whether infarct vs other underlying pathology; patient unable to get MRI.     Patient transferred to SICU and intubated and placed on propofol with EEG placed overnight. No seizures or seizure focus noted but L sided dysfunction correlating w CT findings.

## 2023-09-02 NOTE — ASSESSMENT & PLAN NOTE
- CTH with L parietal hypodensity with subtle areas of hyperdensity concerning for acute/subacute infarct with petechial hemorrhage, CTA negative for LVO or critical stenosis. Determined not a candidate for acute interventions with thrombolytics/thrombectomy.  - As per vascular neuro, continue aspirin and statin. Repeat CT unremarkable so gave clearance to resume anticoagulation.  -On heparin.  Will resume Coumadin once cleared by speech   - PT, OT, Speech consulted.

## 2023-09-02 NOTE — PROGRESS NOTES
"Called into the patient's room by patient's mother @ approximately 1625. Mother stated that patient placed his hand on his chest and stated "Something doesn't feel right" and became unresponsive. Upon entering the patient's room, patient head turned to the right with a blank stare. Patient did not respond to painful stimuli, VSS and no alarms noted on LVAD monitor and all values consistent with previous values. Pupils 3 mm, equal bilaterally, and brisk and reactive to light. BP 98/0. . Patient initially did not respond to verbal stimuli or follow commands. Dr. Diaz notified and at patient's bedside. Patient slowly became alert and more responsive and followed commands with equal strength noted bilaterally. Neurological assessment consistent with previous assessments. Patient reported "feeling more tired" and appeared fatigued. Dr. Diaz updated Vascular Neurology-- Orders to be placed for 24 hour EEG monitoring.  "

## 2023-09-02 NOTE — SUBJECTIVE & OBJECTIVE
"Interval HPI:   Overnight events: Remains in SICU. BG well controlled on current SQ insulin regimen. Diet Cardiac Thin    Eating:   <25%  Nausea: No  Hypoglycemia and intervention: No  Fever: No  TPN and/or TF: No  If yes, type of TF/TPN and rate: n/a    BP (!) 78/0 (BP Location: Left arm, Patient Position: Lying)   Pulse 95   Temp 98.3 °F (36.8 °C) (Oral)   Resp (!) 26   Ht 6' 3" (1.905 m)   Wt 90.7 kg (200 lb)   SpO2 100%   BMI 25.00 kg/m²     Labs Reviewed and Include    Recent Labs   Lab 09/02/23  0304   *   CALCIUM 8.7   ALBUMIN 2.7*   PROT 7.6      K 3.4*   CO2 28   CL 99   BUN 7   CREATININE 0.9   ALKPHOS 97   ALT <5*   AST 19   BILITOT 1.1*     Lab Results   Component Value Date    WBC 11.31 09/02/2023    HGB 10.1 (L) 09/02/2023    HCT 32.9 (L) 09/02/2023    MCV 88 09/02/2023     09/02/2023     Recent Labs   Lab 08/31/23  0536 08/31/23  1634   TSH 4.954* 0.869     Lab Results   Component Value Date    HGBA1C 6.9 (H) 08/31/2023       Nutritional status:   Body mass index is 25 kg/m².  Lab Results   Component Value Date    ALBUMIN 2.7 (L) 09/02/2023    ALBUMIN 2.6 (L) 09/01/2023    ALBUMIN 2.8 (L) 08/31/2023     Lab Results   Component Value Date    PREALBUMIN 15 (L) 08/07/2023    PREALBUMIN 21 08/04/2023    PREALBUMIN 29 08/02/2023       Estimated Creatinine Clearance: 133 mL/min (based on SCr of 0.9 mg/dL).    Accu-Checks  Recent Labs     08/31/23  1037 08/31/23  1254 08/31/23  1633 08/31/23  2020 09/01/23  0323 09/01/23  1050 09/01/23  2139 09/02/23  0029 09/02/23  0330   POCTGLUCOSE 208* 199* 180* 135* 123* 124* 161* 146* 137*       Current Medications and/or Treatments Impacting Glycemic Control  Immunotherapy:    Immunosuppressants       None          Steroids:   Hormones (From admission, onward)      None          Pressors:    Autonomic Drugs (From admission, onward)      None          Hyperglycemia/Diabetes Medications:   Antihyperglycemics (From admission, onward)      Start "     Stop Route Frequency Ordered    09/01/23 2100  insulin detemir U-100 (Levemir) pen 8 Units         -- SubQ 2 times daily 09/01/23 1415    08/31/23 1647  insulin aspart U-100 pen 0-10 Units         -- SubQ Every 4 hours PRN 08/31/23 0244

## 2023-09-02 NOTE — SUBJECTIVE & OBJECTIVE
Interval History: Patient self extubated yesterday but remained stable overnight.  EEG in place overnight per Neurology recommendations.  He remains on Keppra 1g BID.  This morning: he had a LFA while sitting and working with speech therapy.  He is net negative 3L in the last 24 hours.  CVP: 6.  Will stop IV Lasix today and monitor. Can plan to restart po tomorrow if remains stable. Urine tox screen ordered as per vascular neurology consult.    Lines:  PICC: 8/1  A-line 8/31    Continuous Infusions:   dexmedeTOMIDine (Precedex) infusion (titrating) 0.1 mcg/kg/hr (09/02/23 0900)    heparin (porcine) in D5W 14 Units/kg/hr (09/02/23 0900)    milrinone 20mg/100ml D5W (200mcg/ml) 0.25 mcg/kg/min (09/02/23 0900)    nicardipine Stopped (08/31/23 1243)     Scheduled Meds:   aspirin  81 mg Oral Daily    atorvastatin  40 mg Oral Daily    ceFAZolin (ANCEF) 8 g in dextrose 5 % (D5W) 500 mL CONTINUOUS INFUSION  8 g Intravenous Q24H    insulin detemir U-100  8 Units Subcutaneous BID    levETIRAcetam (Keppra) IV (PEDS and ADULTS)  1,000 mg Intravenous Q12H    magnesium sulfate IVPB  2 g Intravenous Once    mupirocin   Nasal BID    pantoprazole  40 mg Intravenous Daily    polyethylene glycol  17 g Oral Daily    senna  8.6 mg Oral Daily    warfarin  4 mg Oral Daily     PRN Meds:sodium chloride 0.9%, dextrose 10%, dextrose 10%, fentaNYL, glucagon (human recombinant), insulin aspart U-100, sodium chloride 0.9%    Review of patient's allergies indicates:   Allergen Reactions    Bumex [bumetanide] Hives    Torsemide Hives     Objective:     Vital Signs (Most Recent):  Temp: 98.3 °F (36.8 °C) (09/02/23 0745)  Pulse: 88 (09/02/23 0915)  Resp: 20 (09/02/23 0915)  BP: (!) 78/0 (09/02/23 0745)  SpO2: 97 % (09/02/23 0915) Vital Signs (24h Range):  Temp:  [98 °F (36.7 °C)-98.7 °F (37.1 °C)] 98.3 °F (36.8 °C)  Pulse:  [] 88  Resp:  [11-30] 20  SpO2:  [77 %-100 %] 97 %  BP: (78-84)/(0) 78/0  Arterial Line BP: ()/(56-87) 85/65      Patient Vitals for the past 72 hrs (Last 3 readings):   Weight   09/02/23 0500 90.7 kg (200 lb)   09/01/23 0305 96.6 kg (213 lb)   08/31/23 1017 95.5 kg (210 lb 8.6 oz)       Body mass index is 25 kg/m².      Intake/Output Summary (Last 24 hours) at 9/2/2023 0954  Last data filed at 9/2/2023 0900  Gross per 24 hour   Intake 695.13 ml   Output 4500 ml   Net -3804.87 ml         Hemodynamic Parameters:              Physical Exam  Constitutional:       Appearance: Normal appearance.      Comments: Intubated and sedated   HENT:      Head: Normocephalic and atraumatic.   Eyes:      Conjunctiva/sclera: Conjunctivae normal.   Neck:      Vascular: No JVD.      Comments: No JVP elevation   Cardiovascular:      Comments: VAD hum appreciated  Pulmonary:      Breath sounds: Normal breath sounds.      Comments: Clear to auscultation  Abdominal:      Comments: Soft, non-tender, non-distended   Musculoskeletal:      Cervical back: Normal range of motion.      Comments: No peripheral edema   Skin:     General: Skin is warm and dry.      Capillary Refill: Capillary refill takes less than 2 seconds.   Neurological:      Mental Status: He is oriented to person, place, and time.            Significant Labs:  CBC:  Recent Labs   Lab 09/01/23 0311 09/01/23 1410 09/02/23  0304   WBC 11.89 11.02 11.31   RBC 3.40* 3.74* 3.75*   HGB 9.4* 10.1* 10.1*   HCT 29.7* 32.6* 32.9*    261 279   MCV 87 87 88   MCH 27.6 27.0 26.9*   MCHC 31.6* 31.0* 30.7*       BNP:  Recent Labs   Lab 08/31/23  1046   *       CMP:  Recent Labs   Lab 08/31/23  1250 08/31/23 2011 09/01/23  0311 09/02/23  0304   * 142* 113* 150*   CALCIUM 8.2* 8.5* 8.4* 8.7   ALBUMIN 2.8*  --  2.6* 2.7*   PROT 7.7  --  7.3 7.6    140 140 139   K 4.0 3.4* 2.9* 3.4*   CO2 26 28 31* 28    100 99 99   BUN 8 8 7 7   CREATININE 1.1 1.1 1.0 0.9   ALKPHOS 95  --  90 97   ALT <5*  --  <5* <5*   AST 21  --  17 19   BILITOT 0.7  --  0.7 1.1*        Coagulation:    Recent Labs   Lab 09/01/23  0311 09/01/23  1410 09/01/23  2125 09/02/23  0304   INR 1.3* 1.2  --  1.3*   APTT 25.9 26.2 32.8* 33.4*  33.4*       LDH:  Recent Labs   Lab 09/01/23  0311 09/02/23  0304   * 368*       Microbiology:  Microbiology Results (last 7 days)       ** No results found for the last 168 hours. **            I have reviewed all pertinent labs within the past 24 hours.    Estimated Creatinine Clearance: 133 mL/min (based on SCr of 0.9 mg/dL).    Diagnostic Results:  I have reviewed all pertinent imaging results/findings within the past 24 hours.

## 2023-09-03 LAB
ALBUMIN SERPL BCP-MCNC: 2.5 G/DL (ref 3.5–5.2)
ALP SERPL-CCNC: 94 U/L (ref 55–135)
ALT SERPL W/O P-5'-P-CCNC: <5 U/L (ref 10–44)
ANION GAP SERPL CALC-SCNC: 13 MMOL/L (ref 8–16)
APTT PPP: 43.5 SEC (ref 21–32)
APTT PPP: 43.5 SEC (ref 21–32)
AST SERPL-CCNC: 17 U/L (ref 10–40)
BASOPHILS # BLD AUTO: 0.03 K/UL (ref 0–0.2)
BASOPHILS NFR BLD: 0.3 % (ref 0–1.9)
BILIRUB SERPL-MCNC: 1 MG/DL (ref 0.1–1)
BUN SERPL-MCNC: 10 MG/DL (ref 6–20)
CALCIUM SERPL-MCNC: 8.5 MG/DL (ref 8.7–10.5)
CHLORIDE SERPL-SCNC: 98 MMOL/L (ref 95–110)
CO2 SERPL-SCNC: 25 MMOL/L (ref 23–29)
CREAT SERPL-MCNC: 1 MG/DL (ref 0.5–1.4)
DIFFERENTIAL METHOD: ABNORMAL
EOSINOPHIL # BLD AUTO: 0.1 K/UL (ref 0–0.5)
EOSINOPHIL NFR BLD: 0.7 % (ref 0–8)
ERYTHROCYTE [DISTWIDTH] IN BLOOD BY AUTOMATED COUNT: 18.6 % (ref 11.5–14.5)
EST. GFR  (NO RACE VARIABLE): >60 ML/MIN/1.73 M^2
GLUCOSE SERPL-MCNC: 132 MG/DL (ref 70–110)
HCT VFR BLD AUTO: 33.7 % (ref 40–54)
HGB BLD-MCNC: 10.4 G/DL (ref 14–18)
IMM GRANULOCYTES # BLD AUTO: 0.02 K/UL (ref 0–0.04)
IMM GRANULOCYTES NFR BLD AUTO: 0.2 % (ref 0–0.5)
INR PPP: 1.2 (ref 0.8–1.2)
LDH SERPL L TO P-CCNC: 268 U/L (ref 110–260)
LYMPHOCYTES # BLD AUTO: 1.9 K/UL (ref 1–4.8)
LYMPHOCYTES NFR BLD: 18.1 % (ref 18–48)
MAGNESIUM SERPL-MCNC: 1.7 MG/DL (ref 1.6–2.6)
MCH RBC QN AUTO: 26.9 PG (ref 27–31)
MCHC RBC AUTO-ENTMCNC: 30.9 G/DL (ref 32–36)
MCV RBC AUTO: 87 FL (ref 82–98)
MONOCYTES # BLD AUTO: 0.6 K/UL (ref 0.3–1)
MONOCYTES NFR BLD: 6 % (ref 4–15)
NEUTROPHILS # BLD AUTO: 7.7 K/UL (ref 1.8–7.7)
NEUTROPHILS NFR BLD: 74.7 % (ref 38–73)
NRBC BLD-RTO: 0 /100 WBC
PHOSPHATE SERPL-MCNC: 2.5 MG/DL (ref 2.7–4.5)
PLATELET # BLD AUTO: 292 K/UL (ref 150–450)
PMV BLD AUTO: 9.3 FL (ref 9.2–12.9)
POCT GLUCOSE: 126 MG/DL (ref 70–110)
POCT GLUCOSE: 128 MG/DL (ref 70–110)
POCT GLUCOSE: 135 MG/DL (ref 70–110)
POCT GLUCOSE: 141 MG/DL (ref 70–110)
POCT GLUCOSE: 148 MG/DL (ref 70–110)
POTASSIUM SERPL-SCNC: 4 MMOL/L (ref 3.5–5.1)
PROT SERPL-MCNC: 6.9 G/DL (ref 6–8.4)
PROTHROMBIN TIME: 12.5 SEC (ref 9–12.5)
RBC # BLD AUTO: 3.86 M/UL (ref 4.6–6.2)
SODIUM SERPL-SCNC: 136 MMOL/L (ref 136–145)
WBC # BLD AUTO: 10.29 K/UL (ref 3.9–12.7)

## 2023-09-03 PROCEDURE — 51798 US URINE CAPACITY MEASURE: CPT

## 2023-09-03 PROCEDURE — 25000003 PHARM REV CODE 250: Performed by: PHYSICIAN ASSISTANT

## 2023-09-03 PROCEDURE — 63600175 PHARM REV CODE 636 W HCPCS: Performed by: STUDENT IN AN ORGANIZED HEALTH CARE EDUCATION/TRAINING PROGRAM

## 2023-09-03 PROCEDURE — 99233 PR SUBSEQUENT HOSPITAL CARE,LEVL III: ICD-10-PCS | Mod: ,,, | Performed by: PHYSICIAN ASSISTANT

## 2023-09-03 PROCEDURE — 25000003 PHARM REV CODE 250: Performed by: NURSE PRACTITIONER

## 2023-09-03 PROCEDURE — 80053 COMPREHEN METABOLIC PANEL: CPT | Performed by: STUDENT IN AN ORGANIZED HEALTH CARE EDUCATION/TRAINING PROGRAM

## 2023-09-03 PROCEDURE — 85025 COMPLETE CBC W/AUTO DIFF WBC: CPT | Performed by: STUDENT IN AN ORGANIZED HEALTH CARE EDUCATION/TRAINING PROGRAM

## 2023-09-03 PROCEDURE — 21400001 HC TELEMETRY ROOM

## 2023-09-03 PROCEDURE — 25000003 PHARM REV CODE 250: Performed by: INTERNAL MEDICINE

## 2023-09-03 PROCEDURE — 93750 INTERROGATION VAD IN PERSON: CPT | Mod: ,,, | Performed by: INTERNAL MEDICINE

## 2023-09-03 PROCEDURE — 85730 THROMBOPLASTIN TIME PARTIAL: CPT | Performed by: STUDENT IN AN ORGANIZED HEALTH CARE EDUCATION/TRAINING PROGRAM

## 2023-09-03 PROCEDURE — 25000003 PHARM REV CODE 250: Performed by: STUDENT IN AN ORGANIZED HEALTH CARE EDUCATION/TRAINING PROGRAM

## 2023-09-03 PROCEDURE — 83735 ASSAY OF MAGNESIUM: CPT | Performed by: STUDENT IN AN ORGANIZED HEALTH CARE EDUCATION/TRAINING PROGRAM

## 2023-09-03 PROCEDURE — 63600175 PHARM REV CODE 636 W HCPCS: Performed by: INTERNAL MEDICINE

## 2023-09-03 PROCEDURE — 93750 PR INTERROGATE VENT ASSIST DEV, IN PERSON, W PHYSICIAN ANALYSIS: ICD-10-PCS | Mod: ,,, | Performed by: INTERNAL MEDICINE

## 2023-09-03 PROCEDURE — 99233 SBSQ HOSP IP/OBS HIGH 50: CPT | Mod: ,,, | Performed by: PHYSICIAN ASSISTANT

## 2023-09-03 PROCEDURE — 83615 LACTATE (LD) (LDH) ENZYME: CPT | Performed by: STUDENT IN AN ORGANIZED HEALTH CARE EDUCATION/TRAINING PROGRAM

## 2023-09-03 PROCEDURE — C9113 INJ PANTOPRAZOLE SODIUM, VIA: HCPCS | Performed by: STUDENT IN AN ORGANIZED HEALTH CARE EDUCATION/TRAINING PROGRAM

## 2023-09-03 PROCEDURE — 99232 PR SUBSEQUENT HOSPITAL CARE,LEVL II: ICD-10-PCS | Mod: ,,, | Performed by: NURSE PRACTITIONER

## 2023-09-03 PROCEDURE — 99232 SBSQ HOSP IP/OBS MODERATE 35: CPT | Mod: ,,, | Performed by: NURSE PRACTITIONER

## 2023-09-03 PROCEDURE — 85610 PROTHROMBIN TIME: CPT | Performed by: STUDENT IN AN ORGANIZED HEALTH CARE EDUCATION/TRAINING PROGRAM

## 2023-09-03 PROCEDURE — 94761 N-INVAS EAR/PLS OXIMETRY MLT: CPT

## 2023-09-03 PROCEDURE — 84100 ASSAY OF PHOSPHORUS: CPT | Performed by: STUDENT IN AN ORGANIZED HEALTH CARE EDUCATION/TRAINING PROGRAM

## 2023-09-03 PROCEDURE — 27000248 HC VAD-ADDITIONAL DAY

## 2023-09-03 RX ORDER — MIRTAZAPINE 15 MG/1
15 TABLET, FILM COATED ORAL NIGHTLY
Status: DISCONTINUED | OUTPATIENT
Start: 2023-09-03 | End: 2023-09-11 | Stop reason: HOSPADM

## 2023-09-03 RX ORDER — DIGOXIN 125 MCG
0.12 TABLET ORAL DAILY
Status: DISCONTINUED | OUTPATIENT
Start: 2023-09-03 | End: 2023-09-03

## 2023-09-03 RX ORDER — VALSARTAN 40 MG/1
40 TABLET ORAL 2 TIMES DAILY
Status: DISCONTINUED | OUTPATIENT
Start: 2023-09-03 | End: 2023-09-11 | Stop reason: HOSPADM

## 2023-09-03 RX ORDER — SODIUM,POTASSIUM PHOSPHATES 280-250MG
1 POWDER IN PACKET (EA) ORAL ONCE
Status: COMPLETED | OUTPATIENT
Start: 2023-09-03 | End: 2023-09-03

## 2023-09-03 RX ORDER — MAGNESIUM SULFATE 1 G/100ML
1 INJECTION INTRAVENOUS ONCE
Status: COMPLETED | OUTPATIENT
Start: 2023-09-03 | End: 2023-09-03

## 2023-09-03 RX ORDER — POTASSIUM CHLORIDE 29.8 MG/ML
30 INJECTION INTRAVENOUS ONCE
Status: COMPLETED | OUTPATIENT
Start: 2023-09-03 | End: 2023-09-03

## 2023-09-03 RX ORDER — ESCITALOPRAM OXALATE 5 MG/1
5 TABLET ORAL DAILY
Status: DISCONTINUED | OUTPATIENT
Start: 2023-09-03 | End: 2023-09-03

## 2023-09-03 RX ADMIN — PANTOPRAZOLE SODIUM 40 MG: 40 INJECTION, POWDER, FOR SOLUTION INTRAVENOUS at 08:09

## 2023-09-03 RX ADMIN — BUSPIRONE HYDROCHLORIDE 10 MG: 10 TABLET ORAL at 08:09

## 2023-09-03 RX ADMIN — VALSARTAN 40 MG: 40 TABLET, FILM COATED ORAL at 09:09

## 2023-09-03 RX ADMIN — POLYETHYLENE GLYCOL 3350 17 G: 17 POWDER, FOR SOLUTION ORAL at 08:09

## 2023-09-03 RX ADMIN — LEVETIRACETAM 1000 MG: 100 INJECTION, SOLUTION INTRAVENOUS at 08:09

## 2023-09-03 RX ADMIN — POTASSIUM BICARBONATE 30 MEQ: 391 TABLET, EFFERVESCENT ORAL at 03:09

## 2023-09-03 RX ADMIN — MILRINONE LACTATE IN DEXTROSE 0.25 MCG/KG/MIN: 200 INJECTION, SOLUTION INTRAVENOUS at 09:09

## 2023-09-03 RX ADMIN — CEFAZOLIN 8 G: 10 INJECTION, POWDER, FOR SOLUTION INTRAVENOUS at 02:09

## 2023-09-03 RX ADMIN — ASPIRIN 81 MG 81 MG: 81 TABLET ORAL at 08:09

## 2023-09-03 RX ADMIN — INSULIN DETEMIR 6 UNITS: 100 INJECTION, SOLUTION SUBCUTANEOUS at 09:09

## 2023-09-03 RX ADMIN — POTASSIUM CHLORIDE 30 MEQ: 29.8 INJECTION, SOLUTION INTRAVENOUS at 12:09

## 2023-09-03 RX ADMIN — ATORVASTATIN CALCIUM 40 MG: 20 TABLET, FILM COATED ORAL at 08:09

## 2023-09-03 RX ADMIN — MIRTAZAPINE 15 MG: 15 TABLET, FILM COATED ORAL at 09:09

## 2023-09-03 RX ADMIN — WARFARIN SODIUM 4 MG: 4 TABLET ORAL at 05:09

## 2023-09-03 RX ADMIN — MUPIROCIN: 20 OINTMENT TOPICAL at 08:09

## 2023-09-03 RX ADMIN — INSULIN DETEMIR 6 UNITS: 100 INJECTION, SOLUTION SUBCUTANEOUS at 08:09

## 2023-09-03 RX ADMIN — HEPARIN SODIUM 16 UNITS/KG/HR: 10000 INJECTION, SOLUTION INTRAVENOUS at 10:09

## 2023-09-03 RX ADMIN — BUSPIRONE HYDROCHLORIDE 10 MG: 10 TABLET ORAL at 09:09

## 2023-09-03 RX ADMIN — LEVETIRACETAM 1000 MG: 100 INJECTION, SOLUTION INTRAVENOUS at 09:09

## 2023-09-03 RX ADMIN — VALSARTAN 40 MG: 40 TABLET, FILM COATED ORAL at 08:09

## 2023-09-03 RX ADMIN — SENNOSIDES 8.6 MG: 8.6 TABLET, FILM COATED ORAL at 08:09

## 2023-09-03 RX ADMIN — MUPIROCIN: 20 OINTMENT TOPICAL at 09:09

## 2023-09-03 RX ADMIN — POTASSIUM & SODIUM PHOSPHATES POWDER PACK 280-160-250 MG 1 PACKET: 280-160-250 PACK at 05:09

## 2023-09-03 RX ADMIN — MAGNESIUM SULFATE HEPTAHYDRATE 1 G: 500 INJECTION, SOLUTION INTRAMUSCULAR; INTRAVENOUS at 06:09

## 2023-09-03 RX ADMIN — MILRINONE LACTATE IN DEXTROSE 0.25 MCG/KG/MIN: 200 INJECTION, SOLUTION INTRAVENOUS at 10:09

## 2023-09-03 RX ADMIN — HEPARIN SODIUM 16 UNITS/KG/HR: 10000 INJECTION, SOLUTION INTRAVENOUS at 04:09

## 2023-09-03 NOTE — ASSESSMENT & PLAN NOTE
- CTH with L parietal hypodensity with subtle areas of hyperdensity concerning for acute/subacute infarct with petechial hemorrhage, CTA negative for LVO or critical stenosis. Determined not a candidate for acute interventions with thrombolytics/thrombectomy.  - As per vascular neuro, continue aspirin and statin. Repeat CT unremarkable so gave clearance to resume anticoagulation.  -On heparin and resumed Coumadin   - PT, OT, Speech consulted.

## 2023-09-03 NOTE — NURSING
Called MD Ibeth with pt lab values, 30mEqs K+ IV ordered and then 30 mEqs of K+ PO to be administered for potassium of 2.9, MD notified of lack of urine output so far and told MD would come to bedside, pt vitally stable with no symptoms or discomfort at this time

## 2023-09-03 NOTE — SUBJECTIVE & OBJECTIVE
"Interval HPI:   Overnight events: Remains in SICU. BG well controlled on current SQ insulin regimen Diet Cardiac Thin    Eatin%  Nausea: No  Hypoglycemia and intervention: No  Fever: No  TPN and/or TF: No  If yes, type of TF/TPN and rate: n/a    BP (!) 96/0 (BP Location: Left arm, Patient Position: Lying)   Pulse 108   Temp 98.3 °F (36.8 °C) (Oral)   Resp (!) 21   Ht 6' 3" (1.905 m)   Wt 90 kg (198 lb 6.6 oz)   SpO2 97%   BMI 24.80 kg/m²     Labs Reviewed and Include    Recent Labs   Lab 23  0410   *   CALCIUM 8.5*   ALBUMIN 2.5*   PROT 6.9      K 4.0   CO2 25   CL 98   BUN 10   CREATININE 1.0   ALKPHOS 94   ALT <5*   AST 17   BILITOT 1.0     Lab Results   Component Value Date    WBC 10.29 2023    HGB 10.4 (L) 2023    HCT 33.7 (L) 2023    MCV 87 2023     2023     Recent Labs   Lab 23  0536 23  1634   TSH 4.954* 0.869     Lab Results   Component Value Date    HGBA1C 6.9 (H) 2023       Nutritional status:   Body mass index is 24.8 kg/m².  Lab Results   Component Value Date    ALBUMIN 2.5 (L) 2023    ALBUMIN 2.7 (L) 2023    ALBUMIN 2.6 (L) 2023     Lab Results   Component Value Date    PREALBUMIN 15 (L) 2023    PREALBUMIN 21 2023    PREALBUMIN 29 2023       Estimated Creatinine Clearance: 119.7 mL/min (based on SCr of 1 mg/dL).    Accu-Checks  Recent Labs     23  0323 23  1050 23  2139 23  0029 23  0330 23  0820 23  1250 23  1631 23  2113 23  0805   POCTGLUCOSE 123* 124* 161* 146* 137* 140* 137* 123* 126* 141*       Current Medications and/or Treatments Impacting Glycemic Control  Immunotherapy:    Immunosuppressants       None          Steroids:   Hormones (From admission, onward)      None          Pressors:    Autonomic Drugs (From admission, onward)      None          Hyperglycemia/Diabetes Medications:   Antihyperglycemics (From " admission, onward)      Start     Stop Route Frequency Ordered    09/03/23 0900  insulin detemir U-100 (Levemir) pen 6 Units         -- SubQ 2 times daily 09/03/23 0726    09/02/23 1334  insulin aspart U-100 pen 0-10 Units         -- SubQ Before meals & nightly PRN 09/02/23 1239

## 2023-09-03 NOTE — PROGRESS NOTES
[facility] - Surgical Intensive Care  Heart Transplant  Progress Note    Patient Name: Kevan Queen  MRN: [id number]  Admission Date: 8/31/2023  Hospital Length of Stay: 3 days  Attending Physician: Luca Lopez Jr.*  Primary Care Provider: Rosio Armendariz FNP  Principal Problem:Embolic stroke involving left middle cerebral artery    Subjective:     Interval History: Held Lasix yesterday and ordered echo.  He passed swallow study so home dose of buspirone, Mirtazapine, valsartan and coumadin restarted.  He had an episode of unresponsiveness while spending time with his family. No hemodynamic changes.  Vascular neurology notified and EEG ordered.  He was given 250cc of fluid in setting of low CVP and decrease UOP.  He is +1.1L in the last 24 hours.  CVP: 6.  Will continue to hold Lasix today.  Will d/c A-line and plan to transfer patient to the floor.     Lines:  PICC: 8/1      Continuous Infusions:   dexmedeTOMIDine (Precedex) infusion (titrating) Stopped (09/02/23 0916)    heparin (porcine) in D5W 16 Units/kg/hr (09/03/23 0800)    milrinone 20mg/100ml D5W (200mcg/ml) 0.25 mcg/kg/min (09/03/23 0900)    nicardipine Stopped (08/31/23 1243)     Scheduled Meds:   aspirin  81 mg Oral Daily    atorvastatin  40 mg Oral Daily    busPIRone  10 mg Oral BID    ceFAZolin (ANCEF) 8 g in dextrose 5 % (D5W) 500 mL CONTINUOUS INFUSION  8 g Intravenous Q24H    insulin detemir U-100  6 Units Subcutaneous BID    levETIRAcetam (Keppra) IV (PEDS and ADULTS)  1,000 mg Intravenous Q12H    mirtazapine  15 mg Oral Nightly    mupirocin   Nasal BID    pantoprazole  40 mg Intravenous Daily    polyethylene glycol  17 g Oral Daily    senna  8.6 mg Oral Daily    valsartan  40 mg Oral BID    warfarin  4 mg Oral Daily     PRN Meds:sodium chloride 0.9%, dextrose 10%, dextrose 10%, glucagon (human recombinant), glucose, glucose, insulin aspart U-100, sodium chloride 0.9%    Review of patient's allergies indicates:   Allergen  Reactions    Bumex [bumetanide] Hives    Torsemide Hives     Objective:     Vital Signs (Most Recent):  Temp: 98.3 °F (36.8 °C) (09/03/23 0800)  Pulse: 108 (09/03/23 0830)  Resp: (!) 21 (09/03/23 0830)  BP: (!) 96/0 (09/03/23 0800)  SpO2: 97 % (09/03/23 0830) Vital Signs (24h Range):  Temp:  [98 °F (36.7 °C)-98.6 °F (37 °C)] 98.3 °F (36.8 °C)  Pulse:  [] 108  Resp:  [10-29] 21  SpO2:  [91 %-100 %] 97 %  BP: (80-98)/(0) 96/0  Arterial Line BP: ()/(53-82) 80/66     Patient Vitals for the past 72 hrs (Last 3 readings):   Weight   09/03/23 0500 90 kg (198 lb 6.6 oz)   09/02/23 0500 90.7 kg (200 lb)   09/01/23 0305 96.6 kg (213 lb)       Body mass index is 24.8 kg/m².      Intake/Output Summary (Last 24 hours) at 9/3/2023 0944  Last data filed at 9/3/2023 0800  Gross per 24 hour   Intake 2614.48 ml   Output 485 ml   Net 2129.48 ml         Hemodynamic Parameters:              Physical Exam  Constitutional:       Appearance: Normal appearance.      Comments: Intubated and sedated   HENT:      Head: Normocephalic and atraumatic.   Eyes:      Conjunctiva/sclera: Conjunctivae normal.   Neck:      Vascular: No JVD.      Comments: No JVP elevation   Cardiovascular:      Comments: VAD hum appreciated  Pulmonary:      Breath sounds: Normal breath sounds.      Comments: Clear to auscultation  Abdominal:      Comments: Soft, non-tender, non-distended   Musculoskeletal:      Cervical back: Normal range of motion.      Comments: No peripheral edema   Skin:     General: Skin is warm and dry.      Capillary Refill: Capillary refill takes less than 2 seconds.   Neurological:      Mental Status: He is oriented to person, place, and time.            Significant Labs:  CBC:  Recent Labs   Lab 09/01/23  1410 09/02/23  0304 09/03/23  0410   WBC 11.02 11.31 10.29   RBC 3.74* 3.75* 3.86*   HGB 10.1* 10.1* 10.4*   HCT 32.6* 32.9* 33.7*    279 292   MCV 87 88 87   MCH 27.0 26.9* 26.9*   MCHC 31.0* 30.7* 30.9*        BNP:  Recent Labs   Lab 08/31/23  1046   *       CMP:  Recent Labs   Lab 09/01/23  0311 09/02/23  0304 09/02/23  2316 09/03/23  0410   * 150* 136* 132*   CALCIUM 8.4* 8.7 8.4* 8.5*   ALBUMIN 2.6* 2.7*  --  2.5*   PROT 7.3 7.6  --  6.9    139 136 136   K 2.9* 3.4* 2.9* 4.0   CO2 31* 28 26 25   CL 99 99 98 98   BUN 7 7 10 10   CREATININE 1.0 0.9 1.2 1.0   ALKPHOS 90 97  --  94   ALT <5* <5*  --  <5*   AST 17 19  --  17   BILITOT 0.7 1.1*  --  1.0        Coagulation:   Recent Labs   Lab 09/01/23  1410 09/01/23  2125 09/02/23  0304 09/02/23  1009 09/02/23  1635 09/02/23  2258 09/03/23  0410   INR 1.2  --  1.3*  --   --   --  1.2   APTT 26.2   < > 33.4*  33.4*   < > 35.3* 42.2* 43.5*  43.5*    < > = values in this interval not displayed.       LDH:  Recent Labs   Lab 09/01/23 0311 09/02/23  0304 09/03/23  0410   * 368* 268*       Microbiology:  Microbiology Results (last 7 days)       ** No results found for the last 168 hours. **            I have reviewed all pertinent labs within the past 24 hours.    Estimated Creatinine Clearance: 119.7 mL/min (based on SCr of 1 mg/dL).    Diagnostic Results:  I have reviewed all pertinent imaging results/findings within the past 24 hours.    Assessment and Plan:     Mr. Kevan Thacker is a 39 y/o AAM w/ PMH of NICM (possible Familial w/ father having LVAD and subsequent transplant) s/p DT-HM3 (6/3/2022), HFrEF, chronic DLES, MSSA bacteremia c/b L empyema (maintained on ancef, maria isabel 9/14), seizures, and IDDM2 who initially presented to [facility] with aphasia and right sided weekness. He was reportedly not taking his coumadin or keppra for the past week due to running out. As per chart review, patient started to experience symptoms around 1am when he went to sleep. Symptoms worsened and EMS was called around 3:30am. Upon admission patient underwent a CT head showed an acute to subacute infarct with possible petechial hemorrhage. Labs were  significant for INR of 1.2. Patient was loaded with Keppra and transferred to [facility] for further care.      Upon arrival, patient was found to be hemodynamically stable w/ MAPs of 106. However, he was noted to be not alert or oriented and extremely lethargic. Patient was noted to have GCS 10-11 with minimally reactive pupils. Patient underwent stat CT brain which showed no acute changes since prior CT, and transferred to the ICU for closer monitoring. Once in the ICU patient was intubated for airway protection.      Of note, patient was most recently hospitalized from 7/22/2023-8/8/2023 for sepsis/covod. Found to have loculated pleural effussion requiring chest tube placement and three doses of lytics. Effussion improved and patient was discharged with 6 week course of Ancef (to end on 9/14).        Home Medications:    Aspirin 81mg daily   Buspirone 10mg bid   Furosemide 80mg daily   Insulin detemir 25 units bid   Insulin aspart 14 units w/ meals   Milrinone 0.25   Mirtazapine 15mg nightly   Valsartan 40mg bi   Warfarin      Cardiac Imaging:    Echo (7/31/2023): HM3 at 5100. AV does not open. IV septum midline. EF 10-15%. LV severely dilated. Normal wall thickness. Severe global hypokinesis. RV not well visualized due to poor acoustic window. Biatrial enlargment. Mod TR.        * Embolic stroke involving left middle cerebral artery  - CTH with L parietal hypodensity with subtle areas of hyperdensity concerning for acute/subacute infarct with petechial hemorrhage, CTA negative for LVO or critical stenosis. Determined not a candidate for acute interventions with thrombolytics/thrombectomy.  - As per vascular neuro, continue aspirin and statin. Repeat CT unremarkable so gave clearance to resume anticoagulation.  -On heparin and resumed Coumadin   - PT, OT, Speech consulted.     LVAD (left ventricular assist device) present  -HeartMate 3 Implanted on 6/29/2022 as DT with RV failure on milrinone at 0.25  mcg/kg/min.  -Coumadin held due to concern for petechial hemorrhage. Repeat CT stable so started back on heparin.  Goal INR 2.0-3.0. On heparin and coumadin  -LDH is stable.  Will continue to monitor daily  -Speed set at 5100, LSL 4700 rpm  -Echo: 7/31/23 EF: 10-15%, LVEDD: 6.87 cm.  No vegation appreciated.  Echo ordered     Procedure: Device Interrogation Including analysis of device parameters  Current Settings: Ventricular Assist Device  Review of device function is stable        9/3/2023     8:00 AM 9/3/2023     7:01 AM 9/3/2023     6:00 AM 9/3/2023     5:00 AM 9/3/2023     4:00 AM 9/3/2023     3:00 AM 9/3/2023     2:00 AM   TXP LVAD INTERROGATIONS   Type HeartMate3 HeartMate3 HeartMate3 HeartMate3 HeartMate3 HeartMate3 HeartMate3   Flow 4.2 4 4.1 4 4.2 4 4   Speed 5100 5100 5100 5100 5100 5100 5100   PI 4.3 4.8 5.1 4.8 4.6 5.2 5.2   Power (Serna) 3.7 3.6 3.5 3.6 3.6 3.6 3.6   LSL 4700 4700 4700 4700 4700 4700 4700   Pulsatility Intermittent pulse Intermittent pulse Intermittent pulse Intermittent pulse Intermittent pulse Intermittent pulse Intermittent pulse       Acute combined systolic and diastolic congestive heart failure  -NICM s/p LVAD and on Milrinone   - Last Echo (7/31/2023): HM3 at 5100. AV does not open. IV septum midline. EF 10-15%. LV severely dilated. Normal wall thickness. Severe global hypokinesis. RV not well visualized due to poor acoustic window. Biatrial enlargment. Mod TR.    -diuresed on IV Lasix with good response.  He is net negative 5.7L since admission.  IV lasix stopped yesterday and CVP: 6 this am.  Will continue to hold Lasix.   -Will get echo repeat echo given LFA morning of 9/2  - Home GDMT: valsartan 40mg bid  - Home diuretic regimen: Lasix 80mg daily.   - Ionotropes: continue home milrinone 0.25.   - Strict I&Os and daily weights.   - Maintain K>4 and Mg>2    Seizure-like activity  - loaded with keppra in ED prior to arrival.   - EEG unremarkable, but remained on overnight.  Will continue keppra 1000mg bid as per Neuro recs   - Neurology consulted. Appreciate recommendations.     Bacteremia due to Staphylococcus  -H/O chronic MSSA DLES infection for which he is on Doxycycline at home  -Blood cxs here +ve for MSSA through 7/28. Repeated 7/29 with NGTD. Repeated again 7/30 NGTD  -PICC line replaced 8/1  -ECHO done 7/31 and no vegetations appreciated   -CT 8/1 with loculated fluid along Left lateral hear border, thoracic surgery consulted, not a candidate for VATS. Chest tube placed with IR 8/3, pleural fluid cultures positive for GPC  -Continue Ancef 8g IV q 24 hours for total of 6 weeks with end date 9/14 (6 weeks after chest tube placement).     Type 2 diabetes mellitus with hyperglycemia  - management as per endocrinology.     On mechanically assisted ventilation  -resolved  -Patient self extubated 9/1  -Mechanically ventillatied for airway protection in the setting of AMS w/ stroke.   - Pulmonology consulted to assist with management. Appreciate their assistance       DEYA Martinez  Heart Transplant  [facility] - Surgical Intensive Care

## 2023-09-03 NOTE — PROCEDURES
ICU EEG/VIDEO MONITORING REPORT    DATE OF SERVICE: 9/2/23  EEG NUMBER: [id number]  LOCATION OF SERVICE: ICU ([address])    METHODOLOGY   Electroencephalographic (EEG) recording is with electrodes placed according to the International 10-20 placement system.  Thirty two (32) channels of digital signal are simultaneously recorded from the scalp and may include EKG, EMG, and/or eye monitors.   Recording band pass was 0.1 to 512 hz.  Digital video recording of the patient is simultaneously recorded with the EEG.  The nursing staff report clinical symptoms and may press an event button when the patient has symptoms of clinical interest to the treating physicians.  EEG and video recording is stored and archived in digital format.  The entire recording is visually reviewed and the times identified by computer analysis as being spikes or seizures are reviewed again.  Activation procedures which include photic stimulation, hyperventilation and instructing patients to perform simple task are done in selected patients.   Compresses spectral analysis (CSA) is also performed on the activity recorded from each individual channel.  This is displayed as a power display of frequencies from 0 to 30 Hz over time.   The CSA analysis is done and displayed continuously.  This is reviewed for asymmetries in power between homologous areas of the scalp and for presence of changes in power which canbe seen when seizures occur.  Sections of suspected abnormalities on the CSA is then compared with the original EEG recording.     [employer] software was also utilized in the review of this study.  This software suite analyzes the EEG recording in multiple domains.  Coherence and rhythmicity is computed to identify EEG sections which may contain organized seizures.  Each channel undergoes analysis to detect presence of spike and sharp waves which have special and morphological characteristic of epileptic activity.  The routine EEG recording is  converted from spacial into frequency domain.  This is then displayed comparing homologous areas to identify areas of significant asymmetry.  Algorithm to identify non-cortically generated artifact is used to separate eye movement, EMG and other artifact from the EEG.      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  Patient subsequently had seizure like activity; eeg to evaluate for seizures.     State of Consciousness:   Lethargy    Background:   The background is moderately disorganized, asymmetric, and continuous.    The right hemisphere displays mainly theta and alpha activity with a PDR that reaches up to 7-8 hz at maximum alertness.  The left hemisphere appears more suppressed and has mainly low amplitude delta and theta activity.     Sleep:   Transition from wakefulness to sleep is noted.    Epileptiform Abnormalities  None       EKG:   Irregular    Events:   None    Impression:   This is an abnormal awake and sleep EEG indicative of a left hemispheric structural lesion and a moderate diffuse encephalopathy.  No epileptiform discharges lateralizing features are noted.      Asiya Sharp MD  [facility]   Department of Neurology/Epilepsy

## 2023-09-03 NOTE — CARE UPDATE
HTS Care Update Note    8PM     Hemodynamics    MAP 78  Hemoglobin 10.1     CVP 6  SVO2:  50  Cardiac Output: 4.9  Cardiac Index: 2.2  SVR: 1179    I/Os    I/O this shift:  In: 72.1 [I.V.:64.2; IV Piggyback:7.9]  Out: -       Intake/Output Summary (Last 24 hours) at 9/2/2023 2108  Last data filed at 9/2/2023 2100  Gross per 24 hour   Intake 2068.89 ml   Output 3230 ml   Net -1161.11 ml       Net IO Since Admission: -5,875.34 mL [09/02/23 2108]    Diuretics: None    Continuous Infusions:      dexmedeTOMIDine (Precedex) infusion (titrating) Stopped (09/02/23 0916)    heparin (porcine) in D5W 16 Units/kg/hr (09/02/23 2100)    milrinone 20mg/100ml D5W (200mcg/ml) 0.25 mcg/kg/min (09/02/23 2100)    nicardipine Stopped (08/31/23 1243)       Mechanical support: LVAD    Plan:  - No changes made, continue current plan of care  - Plan discussed with attending       11PM     UOP has been zero for last hour, with CVP 6 and SvO2 50 decision was made to start 250cc IV NaCl during 4 hours.    1AM     200cc of Urine were scanned in bladder scan, will continue management as now, and reevaluate during the night.     430AM     Patient UOP of 400cc, no acute issues.    Richard Talbert MD  Cardiovascular Disease PGY-IV  [facility]

## 2023-09-03 NOTE — ASSESSMENT & PLAN NOTE
-NICM s/p LVAD and on Milrinone   - Last Echo (7/31/2023): HM3 at 5100. AV does not open. IV septum midline. EF 10-15%. LV severely dilated. Normal wall thickness. Severe global hypokinesis. RV not well visualized due to poor acoustic window. Biatrial enlargment. Mod TR.    -diuresed on IV Lasix with good response.  He is net negative 5.7L since admission.  IV lasix stopped yesterday and CVP: 6 this am.  Will continue to hold Lasix.   -Will get echo repeat echo given LFA morning of 9/2  - Home GDMT: valsartan 40mg bid  - Home diuretic regimen: Lasix 80mg daily.   - Ionotropes: continue home milrinone 0.25.   - Strict I&Os and daily weights.   - Maintain K>4 and Mg>2

## 2023-09-03 NOTE — NURSING
Pt due to void at 2000 put wanted to keep trying to use urinal, gave pt 240cc of water, bladder scanned pt @ 2200 with still no UOP and it showed 49ccs in pts bladder, per MD Michael, BMP drawn and sent and 250cc bolus of NS ordered to infuse over 4 hours

## 2023-09-03 NOTE — PROGRESS NOTES
09/03/2023  Miracle Caballero    Current provider:  Luca Lopez Jr.*    Device interrogation:      9/3/2023     8:00 AM 9/3/2023     7:01 AM 9/3/2023     6:00 AM 9/3/2023     5:00 AM 9/3/2023     4:00 AM 9/3/2023     3:00 AM 9/3/2023     2:00 AM   TXP LVAD INTERROGATIONS   Type HeartMate3 HeartMate3 HeartMate3 HeartMate3 HeartMate3 HeartMate3 HeartMate3   Flow 4.2 4 4.1 4 4.2 4 4   Speed 5100 5100 5100 5100 5100 5100 5100   PI 4.3 4.8 5.1 4.8 4.6 5.2 5.2   Power (Serna) 3.7 3.6 3.5 3.6 3.6 3.6 3.6   LSL 4700 4700 4700 4700 4700 4700 4700   Pulsatility Intermittent pulse Intermittent pulse Intermittent pulse Intermittent pulse Intermittent pulse Intermittent pulse Intermittent pulse          Rounded on Kevan Queen to ensure all mechanical assist device settings (IABP or VAD) were appropriate and all parameters were within limits.  I was able to ensure all back up equipment was present, the staff had no issues, and the Perfusion Department daily rounding was complete.      For implantable VADs: Interrogation of Ventricular assist device was performed with analysis of device parameters and review of device function. I have personally reviewed the interrogation findings and agree with findings as stated.     In emergency, the nursing units have been notified to contact the perfusion department either by:  Calling [phone] from 630am to 4pm Mon thru Fri, utilizing the On-Call Finder functionality of Epic and searching for Perfusion, or by contacting the hospital  from 4pm to 630am and on weekends and asking to speak with the perfusionist on call.    9:10 AM

## 2023-09-03 NOTE — PROGRESS NOTES
"[facility]  Endocrinology  Progress Note    Admit Date: 2023     Reason for Consult: Management of T2DM, Hyperglycemia      Surgical Procedure and Date: LVAD 2022     Diabetes diagnosis year:      Home Diabetes Medications:   -Levemir 22 units BID.   -Novolog 16 units TIDWM in addition to the following correction scale:     150 - 200 + 1 unit     201 - 250 + 2 units     251 - 300 + 3 units     301 - 350 + 4 units      > 350   + 5 units     How often checking glucose at home?  Uses Freestyle William    BG readings on regimen: 92-180s  Hypoglycemia on the regimen?  No  Missed doses on regimen?  occasionally skips mealsn and will skip Novolog with breakfast      Diabetes Complications include:     Hyperglycemia     Complicating diabetes co morbidities:   History of MI, CHF, and CKD        HPI: 38 year old male with a past medical history of CHF, DM, drug abuse, medication noncompliance, cardiomyopathy s/p LVAD, and seizures presented to ED on  for aphasia and right sided weakness. Hemorraghic stroke noted. Endocrine consulted to managed hyperglycemia and type 2 diabetes.       Interval HPI:   Overnight events: Remains in SICU. BG well controlled on current SQ insulin regimen Diet Cardiac Thin    Eatin%  Nausea: No  Hypoglycemia and intervention: No  Fever: No  TPN and/or TF: No  If yes, type of TF/TPN and rate: n/a    BP (!) 96/0 (BP Location: Left arm, Patient Position: Lying)   Pulse 108   Temp 98.3 °F (36.8 °C) (Oral)   Resp (!) 21   Ht 6' 3" (1.905 m)   Wt 90 kg (198 lb 6.6 oz)   SpO2 97%   BMI 24.80 kg/m²     Labs Reviewed and Include    Recent Labs   Lab 23  0410   *   CALCIUM 8.5*   ALBUMIN 2.5*   PROT 6.9      K 4.0   CO2 25   CL 98   BUN 10   CREATININE 1.0   ALKPHOS 94   ALT <5*   AST 17   BILITOT 1.0     Lab Results   Component Value Date    WBC 10.29 2023    HGB 10.4 (L) 2023    HCT 33.7 (L) 2023    MCV 87 2023 "     09/03/2023     Recent Labs   Lab 08/31/23  0536 08/31/23  1634   TSH 4.954* 0.869     Lab Results   Component Value Date    HGBA1C 6.9 (H) 08/31/2023       Nutritional status:   Body mass index is 24.8 kg/m².  Lab Results   Component Value Date    ALBUMIN 2.5 (L) 09/03/2023    ALBUMIN 2.7 (L) 09/02/2023    ALBUMIN 2.6 (L) 09/01/2023     Lab Results   Component Value Date    PREALBUMIN 15 (L) 08/07/2023    PREALBUMIN 21 08/04/2023    PREALBUMIN 29 08/02/2023       Estimated Creatinine Clearance: 119.7 mL/min (based on SCr of 1 mg/dL).    Accu-Checks  Recent Labs     09/01/23  0323 09/01/23  1050 09/01/23  2139 09/02/23  0029 09/02/23  0330 09/02/23  0820 09/02/23  1250 09/02/23  1631 09/02/23  2113 09/03/23  0805   POCTGLUCOSE 123* 124* 161* 146* 137* 140* 137* 123* 126* 141*       Current Medications and/or Treatments Impacting Glycemic Control  Immunotherapy:    Immunosuppressants       None          Steroids:   Hormones (From admission, onward)      None          Pressors:    Autonomic Drugs (From admission, onward)      None          Hyperglycemia/Diabetes Medications:   Antihyperglycemics (From admission, onward)      Start     Stop Route Frequency Ordered    09/03/23 0900  insulin detemir U-100 (Levemir) pen 6 Units         -- SubQ 2 times daily 09/03/23 0726    09/02/23 1334  insulin aspart U-100 pen 0-10 Units         -- SubQ Before meals & nightly PRN 09/02/23 1234            ASSESSMENT and PLAN    Neuro  * Embolic stroke involving left middle cerebral artery  Managed per primary team  Avoid hypoglycemia        Cardiac/Vascular  LVAD (left ventricular assist device) present  Managed per primary team  Avoid hypoglycemia        Endocrine  Type 2 diabetes mellitus with hyperglycemia  Endocrinology consulted for BG management.   BG goal 140-180    - Decrease Levemir (Insulin Detemir) to 6 units BID  - Novolog (Insulin Aspart) prn for BG excursions [facility] SSI (150/25)  - BG checks ac/hs   - Hypoglycemia  protocol in place  - If blood glucose greater than 300, please ask patient not to eat food or drink anything other than water until correctional insulin has brought it back below 250    ** Please notify Endocrine for any change and/or advance in diet**  ** Please call Endocrine for any BG related issues **    Discharge Planning:   TBD. Please notify endocrinology prior to discharge.          Jody Starkey NP  Endocrinology  [facility]

## 2023-09-03 NOTE — ASSESSMENT & PLAN NOTE
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  -Coumadin held due to concern for petechial hemorrhage. Repeat CT stable so started back on heparin.  Goal INR 2.0-3.0. On heparin and coumadin  -LDH is stable.  Will continue to monitor daily  -Speed set at 5100, LSL 4700 rpm  -Echo: 7/31/23 EF: 10-15%, LVEDD: 6.87 cm.  No vegation appreciated.  Echo ordered     Procedure: Device Interrogation Including analysis of device parameters  Current Settings: Ventricular Assist Device  Review of device function is stable        9/3/2023     8:00 AM 9/3/2023     7:01 AM 9/3/2023     6:00 AM 9/3/2023     5:00 AM 9/3/2023     4:00 AM 9/3/2023     3:00 AM 9/3/2023     2:00 AM   TXP LVAD INTERROGATIONS   Type HeartMate3 HeartMate3 HeartMate3 HeartMate3 HeartMate3 HeartMate3 HeartMate3   Flow 4.2 4 4.1 4 4.2 4 4   Speed 5100 5100 5100 5100 5100 5100 5100   PI 4.3 4.8 5.1 4.8 4.6 5.2 5.2   Power (Serna) 3.7 3.6 3.5 3.6 3.6 3.6 3.6   LSL 4700 4700 4700 4700 4700 4700 4700   Pulsatility Intermittent pulse Intermittent pulse Intermittent pulse Intermittent pulse Intermittent pulse Intermittent pulse Intermittent pulse

## 2023-09-03 NOTE — ASSESSMENT & PLAN NOTE
Endocrinology consulted for BG management.   BG goal 140-180    - Decrease Levemir (Insulin Detemir) to 6 units BID  - Novolog (Insulin Aspart) prn for BG excursions [facility] SSI (150/25)  - BG checks ac/hs   - Hypoglycemia protocol in place  - If blood glucose greater than 300, please ask patient not to eat food or drink anything other than water until correctional insulin has brought it back below 250    ** Please notify Endocrine for any change and/or advance in diet**  ** Please call Endocrine for any BG related issues **    Discharge Planning:   TBD. Please notify endocrinology prior to discharge.

## 2023-09-03 NOTE — PLAN OF CARE
SICU PLAN OF CARE NOTE    Dx: Embolic stroke involving left middle cerebral artery    Goals of Care: PT/OT, EEG?, SD    Vital Signs (last 12 hours):   Temp:  [98 °F (36.7 °C)-98.5 °F (36.9 °C)]   Pulse:  []   Resp:  [10-26]   BP: (80-84)/(0)   SpO2:  [92 %-99 %]   Arterial Line BP: ()/(67-80)      Neuro: Follows Commands, Moves All Extremities, and Confused    Cardiac: NSR in 70s-90s    Respiratory: Room Air    Gtts: Milrinone, Heparin, and Continuous Ancef     Urine Output: Voids Spontaneously 400 cc/shift    Drains: none    Diet: Regular Diet    LVAD Speed 5100             Flows 3.7-4.2             PI 3.5-6.5             PO 3.4-3.6    Labs/Accuchecks: daily/achs    Skin: LVAD dressing CDI, skin otherwise intact, specialty bed in use overnight, pt able to shift weight independently    Shift Events: gave 250cc NS over 4 hours for lack of UOP > 6 hours after melendez removal, pt was then able to void on his own

## 2023-09-03 NOTE — SUBJECTIVE & OBJECTIVE
Interval History: Held Lasix yesterday and ordered echo.  He passed swallow study so home dose of buspirone, Mirtazapine, valsartan and coumadin restarted.  He had an episode of unresponsiveness while spending time with his family. No hemodynamic changes.  Vascular neurology notified and EEG ordered.  He was given 250cc of fluid in setting of low CVP and decrease UOP.  He is +1.1L in the last 24 hours.  CVP: 6.  Will continue to hold Lasix today.  Will d/c A-line and plan to transfer patient to the floor.     Lines:  PICC: 8/1      Continuous Infusions:   dexmedeTOMIDine (Precedex) infusion (titrating) Stopped (09/02/23 0916)    heparin (porcine) in D5W 16 Units/kg/hr (09/03/23 0800)    milrinone 20mg/100ml D5W (200mcg/ml) 0.25 mcg/kg/min (09/03/23 0900)    nicardipine Stopped (08/31/23 1243)     Scheduled Meds:   aspirin  81 mg Oral Daily    atorvastatin  40 mg Oral Daily    busPIRone  10 mg Oral BID    ceFAZolin (ANCEF) 8 g in dextrose 5 % (D5W) 500 mL CONTINUOUS INFUSION  8 g Intravenous Q24H    insulin detemir U-100  6 Units Subcutaneous BID    levETIRAcetam (Keppra) IV (PEDS and ADULTS)  1,000 mg Intravenous Q12H    mirtazapine  15 mg Oral Nightly    mupirocin   Nasal BID    pantoprazole  40 mg Intravenous Daily    polyethylene glycol  17 g Oral Daily    senna  8.6 mg Oral Daily    valsartan  40 mg Oral BID    warfarin  4 mg Oral Daily     PRN Meds:sodium chloride 0.9%, dextrose 10%, dextrose 10%, glucagon (human recombinant), glucose, glucose, insulin aspart U-100, sodium chloride 0.9%    Review of patient's allergies indicates:   Allergen Reactions    Bumex [bumetanide] Hives    Torsemide Hives     Objective:     Vital Signs (Most Recent):  Temp: 98.3 °F (36.8 °C) (09/03/23 0800)  Pulse: 108 (09/03/23 0830)  Resp: (!) 21 (09/03/23 0830)  BP: (!) 96/0 (09/03/23 0800)  SpO2: 97 % (09/03/23 0830) Vital Signs (24h Range):  Temp:  [98 °F (36.7 °C)-98.6 °F (37 °C)] 98.3 °F (36.8 °C)  Pulse:  [] 108  Resp:   [10-29] 21  SpO2:  [91 %-100 %] 97 %  BP: (80-98)/(0) 96/0  Arterial Line BP: ()/(53-82) 80/66     Patient Vitals for the past 72 hrs (Last 3 readings):   Weight   09/03/23 0500 90 kg (198 lb 6.6 oz)   09/02/23 0500 90.7 kg (200 lb)   09/01/23 0305 96.6 kg (213 lb)       Body mass index is 24.8 kg/m².      Intake/Output Summary (Last 24 hours) at 9/3/2023 0944  Last data filed at 9/3/2023 0800  Gross per 24 hour   Intake 2614.48 ml   Output 485 ml   Net 2129.48 ml         Hemodynamic Parameters:              Physical Exam  Constitutional:       Appearance: Normal appearance.      Comments: Intubated and sedated   HENT:      Head: Normocephalic and atraumatic.   Eyes:      Conjunctiva/sclera: Conjunctivae normal.   Neck:      Vascular: No JVD.      Comments: No JVP elevation   Cardiovascular:      Comments: VAD hum appreciated  Pulmonary:      Breath sounds: Normal breath sounds.      Comments: Clear to auscultation  Abdominal:      Comments: Soft, non-tender, non-distended   Musculoskeletal:      Cervical back: Normal range of motion.      Comments: No peripheral edema   Skin:     General: Skin is warm and dry.      Capillary Refill: Capillary refill takes less than 2 seconds.   Neurological:      Mental Status: He is oriented to person, place, and time.            Significant Labs:  CBC:  Recent Labs   Lab 09/01/23  1410 09/02/23  0304 09/03/23  0410   WBC 11.02 11.31 10.29   RBC 3.74* 3.75* 3.86*   HGB 10.1* 10.1* 10.4*   HCT 32.6* 32.9* 33.7*    279 292   MCV 87 88 87   MCH 27.0 26.9* 26.9*   MCHC 31.0* 30.7* 30.9*       BNP:  Recent Labs   Lab 08/31/23  1046   *       CMP:  Recent Labs   Lab 09/01/23  0311 09/02/23  0304 09/02/23  2316 09/03/23  0410   * 150* 136* 132*   CALCIUM 8.4* 8.7 8.4* 8.5*   ALBUMIN 2.6* 2.7*  --  2.5*   PROT 7.3 7.6  --  6.9    139 136 136   K 2.9* 3.4* 2.9* 4.0   CO2 31* 28 26 25   CL 99 99 98 98   BUN 7 7 10 10   CREATININE 1.0 0.9 1.2 1.0   ALKPHOS 90  97  --  94   ALT <5* <5*  --  <5*   AST 17 19  --  17   BILITOT 0.7 1.1*  --  1.0        Coagulation:   Recent Labs   Lab 09/01/23  1410 09/01/23  2125 09/02/23  0304 09/02/23  1009 09/02/23  1635 09/02/23  2258 09/03/23  0410   INR 1.2  --  1.3*  --   --   --  1.2   APTT 26.2   < > 33.4*  33.4*   < > 35.3* 42.2* 43.5*  43.5*    < > = values in this interval not displayed.       LDH:  Recent Labs   Lab 09/01/23  0311 09/02/23  0304 09/03/23  0410   * 368* 268*       Microbiology:  Microbiology Results (last 7 days)       ** No results found for the last 168 hours. **            I have reviewed all pertinent labs within the past 24 hours.    Estimated Creatinine Clearance: 119.7 mL/min (based on SCr of 1 mg/dL).    Diagnostic Results:  I have reviewed all pertinent imaging results/findings within the past 24 hours.   female recent stressors presenting to the ED with dizziness and ? syncopal episode, elevated CRT and + UTI, decreased po intake due to recent stressors   problem list   1: uti --> Keflex BID   - repeat BMP am to check kidney function s/p hydration   2: dizziness,   - CT negative   - pending MRI   3: ? syncope  - serial trops  - us carotids

## 2023-09-04 LAB
ALBUMIN SERPL BCP-MCNC: 2.6 G/DL (ref 3.5–5.2)
ALP SERPL-CCNC: 100 U/L (ref 55–135)
ALT SERPL W/O P-5'-P-CCNC: <5 U/L (ref 10–44)
ANION GAP SERPL CALC-SCNC: 9 MMOL/L (ref 8–16)
APTT PPP: 44.4 SEC (ref 21–32)
APTT PPP: 44.4 SEC (ref 21–32)
AST SERPL-CCNC: 16 U/L (ref 10–40)
BASOPHILS # BLD AUTO: 0.02 K/UL (ref 0–0.2)
BASOPHILS NFR BLD: 0.2 % (ref 0–1.9)
BILIRUB SERPL-MCNC: 0.8 MG/DL (ref 0.1–1)
BUN SERPL-MCNC: 7 MG/DL (ref 6–20)
CALCIUM SERPL-MCNC: 8.7 MG/DL (ref 8.7–10.5)
CHLORIDE SERPL-SCNC: 100 MMOL/L (ref 95–110)
CO2 SERPL-SCNC: 26 MMOL/L (ref 23–29)
CREAT SERPL-MCNC: 1 MG/DL (ref 0.5–1.4)
DIFFERENTIAL METHOD: ABNORMAL
EOSINOPHIL # BLD AUTO: 0.1 K/UL (ref 0–0.5)
EOSINOPHIL NFR BLD: 0.8 % (ref 0–8)
ERYTHROCYTE [DISTWIDTH] IN BLOOD BY AUTOMATED COUNT: 18.6 % (ref 11.5–14.5)
EST. GFR  (NO RACE VARIABLE): >60 ML/MIN/1.73 M^2
GLUCOSE SERPL-MCNC: 145 MG/DL (ref 70–110)
HCT VFR BLD AUTO: 34.2 % (ref 40–54)
HGB BLD-MCNC: 10.8 G/DL (ref 14–18)
IMM GRANULOCYTES # BLD AUTO: 0.02 K/UL (ref 0–0.04)
IMM GRANULOCYTES NFR BLD AUTO: 0.2 % (ref 0–0.5)
INR PPP: 1.3 (ref 0.8–1.2)
LDH SERPL L TO P-CCNC: 279 U/L (ref 110–260)
LEVETIRACETAM SERPL-MCNC: 12.4 UG/ML (ref 3–60)
LEVETIRACETAM SERPL-MCNC: 15.8 UG/ML (ref 3–60)
LYMPHOCYTES # BLD AUTO: 1.4 K/UL (ref 1–4.8)
LYMPHOCYTES NFR BLD: 15.7 % (ref 18–48)
MAGNESIUM SERPL-MCNC: 1.7 MG/DL (ref 1.6–2.6)
MAGNESIUM SERPL-MCNC: 1.9 MG/DL (ref 1.6–2.6)
MCH RBC QN AUTO: 27 PG (ref 27–31)
MCHC RBC AUTO-ENTMCNC: 31.6 G/DL (ref 32–36)
MCV RBC AUTO: 86 FL (ref 82–98)
MONOCYTES # BLD AUTO: 0.7 K/UL (ref 0.3–1)
MONOCYTES NFR BLD: 7.5 % (ref 4–15)
NEUTROPHILS # BLD AUTO: 6.5 K/UL (ref 1.8–7.7)
NEUTROPHILS NFR BLD: 75.6 % (ref 38–73)
NRBC BLD-RTO: 0 /100 WBC
PHOSPHATE SERPL-MCNC: 2.4 MG/DL (ref 2.7–4.5)
PLATELET # BLD AUTO: 303 K/UL (ref 150–450)
PMV BLD AUTO: 9.8 FL (ref 9.2–12.9)
POCT GLUCOSE: 125 MG/DL (ref 70–110)
POCT GLUCOSE: 134 MG/DL (ref 70–110)
POCT GLUCOSE: 149 MG/DL (ref 70–110)
POCT GLUCOSE: 254 MG/DL (ref 70–110)
POTASSIUM SERPL-SCNC: 3 MMOL/L (ref 3.5–5.1)
POTASSIUM SERPL-SCNC: 3.2 MMOL/L (ref 3.5–5.1)
PROT SERPL-MCNC: 7.3 G/DL (ref 6–8.4)
PROTHROMBIN TIME: 13.4 SEC (ref 9–12.5)
RBC # BLD AUTO: 4 M/UL (ref 4.6–6.2)
SODIUM SERPL-SCNC: 135 MMOL/L (ref 136–145)
WBC # BLD AUTO: 8.64 K/UL (ref 3.9–12.7)

## 2023-09-04 PROCEDURE — 63600175 PHARM REV CODE 636 W HCPCS: Performed by: INTERNAL MEDICINE

## 2023-09-04 PROCEDURE — 84132 ASSAY OF SERUM POTASSIUM: CPT | Performed by: PHYSICIAN ASSISTANT

## 2023-09-04 PROCEDURE — 63600175 PHARM REV CODE 636 W HCPCS: Performed by: STUDENT IN AN ORGANIZED HEALTH CARE EDUCATION/TRAINING PROGRAM

## 2023-09-04 PROCEDURE — 25000003 PHARM REV CODE 250: Performed by: INTERNAL MEDICINE

## 2023-09-04 PROCEDURE — 80053 COMPREHEN METABOLIC PANEL: CPT | Performed by: STUDENT IN AN ORGANIZED HEALTH CARE EDUCATION/TRAINING PROGRAM

## 2023-09-04 PROCEDURE — 25000003 PHARM REV CODE 250: Performed by: PHYSICIAN ASSISTANT

## 2023-09-04 PROCEDURE — 85730 THROMBOPLASTIN TIME PARTIAL: CPT | Performed by: STUDENT IN AN ORGANIZED HEALTH CARE EDUCATION/TRAINING PROGRAM

## 2023-09-04 PROCEDURE — 21400001 HC TELEMETRY ROOM

## 2023-09-04 PROCEDURE — 99233 SBSQ HOSP IP/OBS HIGH 50: CPT | Mod: ,,, | Performed by: INTERNAL MEDICINE

## 2023-09-04 PROCEDURE — 25000003 PHARM REV CODE 250: Performed by: STUDENT IN AN ORGANIZED HEALTH CARE EDUCATION/TRAINING PROGRAM

## 2023-09-04 PROCEDURE — 99233 PR SUBSEQUENT HOSPITAL CARE,LEVL III: ICD-10-PCS | Mod: ,,, | Performed by: INTERNAL MEDICINE

## 2023-09-04 PROCEDURE — 83735 ASSAY OF MAGNESIUM: CPT | Mod: 91 | Performed by: PHYSICIAN ASSISTANT

## 2023-09-04 PROCEDURE — 27000248 HC VAD-ADDITIONAL DAY

## 2023-09-04 PROCEDURE — 20000000 HC ICU ROOM

## 2023-09-04 PROCEDURE — 97116 GAIT TRAINING THERAPY: CPT

## 2023-09-04 PROCEDURE — 93750 INTERROGATION VAD IN PERSON: CPT | Mod: ,,, | Performed by: INTERNAL MEDICINE

## 2023-09-04 PROCEDURE — 97535 SELF CARE MNGMENT TRAINING: CPT

## 2023-09-04 PROCEDURE — 63600175 PHARM REV CODE 636 W HCPCS: Performed by: NURSE PRACTITIONER

## 2023-09-04 PROCEDURE — 83735 ASSAY OF MAGNESIUM: CPT | Performed by: STUDENT IN AN ORGANIZED HEALTH CARE EDUCATION/TRAINING PROGRAM

## 2023-09-04 PROCEDURE — C9113 INJ PANTOPRAZOLE SODIUM, VIA: HCPCS | Performed by: STUDENT IN AN ORGANIZED HEALTH CARE EDUCATION/TRAINING PROGRAM

## 2023-09-04 PROCEDURE — 84100 ASSAY OF PHOSPHORUS: CPT | Performed by: INTERNAL MEDICINE

## 2023-09-04 PROCEDURE — 85610 PROTHROMBIN TIME: CPT | Performed by: STUDENT IN AN ORGANIZED HEALTH CARE EDUCATION/TRAINING PROGRAM

## 2023-09-04 PROCEDURE — 85025 COMPLETE CBC W/AUTO DIFF WBC: CPT | Performed by: STUDENT IN AN ORGANIZED HEALTH CARE EDUCATION/TRAINING PROGRAM

## 2023-09-04 PROCEDURE — 93750 PR INTERROGATE VENT ASSIST DEV, IN PERSON, W PHYSICIAN ANALYSIS: ICD-10-PCS | Mod: ,,, | Performed by: INTERNAL MEDICINE

## 2023-09-04 PROCEDURE — 83615 LACTATE (LD) (LDH) ENZYME: CPT | Performed by: STUDENT IN AN ORGANIZED HEALTH CARE EDUCATION/TRAINING PROGRAM

## 2023-09-04 RX ORDER — PANTOPRAZOLE SODIUM 40 MG/1
40 TABLET, DELAYED RELEASE ORAL DAILY
Status: DISCONTINUED | OUTPATIENT
Start: 2023-09-05 | End: 2023-09-11 | Stop reason: HOSPADM

## 2023-09-04 RX ORDER — LEVETIRACETAM 500 MG/1
1000 TABLET ORAL 2 TIMES DAILY
Status: DISCONTINUED | OUTPATIENT
Start: 2023-09-04 | End: 2023-09-04

## 2023-09-04 RX ORDER — SODIUM,POTASSIUM PHOSPHATES 280-250MG
2 POWDER IN PACKET (EA) ORAL ONCE
Status: COMPLETED | OUTPATIENT
Start: 2023-09-04 | End: 2023-09-04

## 2023-09-04 RX ORDER — LEVETIRACETAM 500 MG/1
1000 TABLET ORAL 2 TIMES DAILY
Status: DISCONTINUED | OUTPATIENT
Start: 2023-09-04 | End: 2023-09-11 | Stop reason: HOSPADM

## 2023-09-04 RX ORDER — MAGNESIUM SULFATE HEPTAHYDRATE 40 MG/ML
2 INJECTION, SOLUTION INTRAVENOUS ONCE
Status: COMPLETED | OUTPATIENT
Start: 2023-09-04 | End: 2023-09-04

## 2023-09-04 RX ORDER — POTASSIUM CHLORIDE 29.8 MG/ML
40 INJECTION INTRAVENOUS ONCE
Status: COMPLETED | OUTPATIENT
Start: 2023-09-04 | End: 2023-09-04

## 2023-09-04 RX ORDER — POTASSIUM CHLORIDE 20 MEQ/1
40 TABLET, EXTENDED RELEASE ORAL
Status: COMPLETED | OUTPATIENT
Start: 2023-09-04 | End: 2023-09-04

## 2023-09-04 RX ORDER — SODIUM,POTASSIUM PHOSPHATES 280-250MG
1 POWDER IN PACKET (EA) ORAL ONCE
Status: DISCONTINUED | OUTPATIENT
Start: 2023-09-04 | End: 2023-09-04

## 2023-09-04 RX ORDER — LANOLIN ALCOHOL/MO/W.PET/CERES
400 CREAM (GRAM) TOPICAL ONCE
Status: COMPLETED | OUTPATIENT
Start: 2023-09-04 | End: 2023-09-04

## 2023-09-04 RX ADMIN — HEPARIN SODIUM 16 UNITS/KG/HR: 10000 INJECTION, SOLUTION INTRAVENOUS at 02:09

## 2023-09-04 RX ADMIN — POTASSIUM & SODIUM PHOSPHATES POWDER PACK 280-160-250 MG 2 PACKET: 280-160-250 PACK at 05:09

## 2023-09-04 RX ADMIN — ASPIRIN 81 MG 81 MG: 81 TABLET ORAL at 08:09

## 2023-09-04 RX ADMIN — POTASSIUM CHLORIDE 40 MEQ: 1500 TABLET, EXTENDED RELEASE ORAL at 07:09

## 2023-09-04 RX ADMIN — PANTOPRAZOLE SODIUM 40 MG: 40 INJECTION, POWDER, FOR SOLUTION INTRAVENOUS at 08:09

## 2023-09-04 RX ADMIN — INSULIN DETEMIR 6 UNITS: 100 INJECTION, SOLUTION SUBCUTANEOUS at 09:09

## 2023-09-04 RX ADMIN — MUPIROCIN: 20 OINTMENT TOPICAL at 09:09

## 2023-09-04 RX ADMIN — BUSPIRONE HYDROCHLORIDE 10 MG: 10 TABLET ORAL at 08:09

## 2023-09-04 RX ADMIN — LEVETIRACETAM 1000 MG: 100 INJECTION, SOLUTION INTRAVENOUS at 08:09

## 2023-09-04 RX ADMIN — POTASSIUM BICARBONATE 25 MEQ: 978 TABLET, EFFERVESCENT ORAL at 05:09

## 2023-09-04 RX ADMIN — BUSPIRONE HYDROCHLORIDE 10 MG: 10 TABLET ORAL at 09:09

## 2023-09-04 RX ADMIN — Medication 400 MG: at 07:09

## 2023-09-04 RX ADMIN — POTASSIUM CHLORIDE 40 MEQ: 1500 TABLET, EXTENDED RELEASE ORAL at 09:09

## 2023-09-04 RX ADMIN — POTASSIUM CHLORIDE 40 MEQ: 29.8 INJECTION, SOLUTION INTRAVENOUS at 06:09

## 2023-09-04 RX ADMIN — WARFARIN SODIUM 4 MG: 4 TABLET ORAL at 04:09

## 2023-09-04 RX ADMIN — CEFAZOLIN 8 G: 10 INJECTION, POWDER, FOR SOLUTION INTRAVENOUS at 01:09

## 2023-09-04 RX ADMIN — LEVETIRACETAM 1000 MG: 500 TABLET, FILM COATED ORAL at 09:09

## 2023-09-04 RX ADMIN — ATORVASTATIN CALCIUM 40 MG: 20 TABLET, FILM COATED ORAL at 08:09

## 2023-09-04 RX ADMIN — MAGNESIUM SULFATE 2 G: 2 INJECTION INTRAVENOUS at 09:09

## 2023-09-04 RX ADMIN — INSULIN DETEMIR 6 UNITS: 100 INJECTION, SOLUTION SUBCUTANEOUS at 08:09

## 2023-09-04 RX ADMIN — MIRTAZAPINE 15 MG: 15 TABLET, FILM COATED ORAL at 09:09

## 2023-09-04 RX ADMIN — INSULIN ASPART 6 UNITS: 100 INJECTION, SOLUTION INTRAVENOUS; SUBCUTANEOUS at 03:09

## 2023-09-04 RX ADMIN — VALSARTAN 40 MG: 40 TABLET, FILM COATED ORAL at 09:09

## 2023-09-04 RX ADMIN — MUPIROCIN: 20 OINTMENT TOPICAL at 08:09

## 2023-09-04 RX ADMIN — VALSARTAN 40 MG: 40 TABLET, FILM COATED ORAL at 08:09

## 2023-09-04 RX ADMIN — MILRINONE LACTATE IN DEXTROSE 0.25 MCG/KG/MIN: 200 INJECTION, SOLUTION INTRAVENOUS at 02:09

## 2023-09-04 NOTE — PLAN OF CARE
Problem: Occupational Therapy  Goal: Occupational Therapy Goal  Description: Goals to be met by: 9/22/2023     Patient will increase functional independence with ADLs by performing:    UE Dressing with Set-up Assistance.  Grooming while standing at sink with Modified [place].  Toileting from toilet with Modified [place] for hygiene and clothing management.   Stand pivot transfers with Modified [place].  Toilet transfer to toilet with Modified [place].    Outcome: Ongoing, Progressing

## 2023-09-04 NOTE — NURSING
SICU PLAN OF CARE NOTE    Dx: Embolic stroke involving left middle cerebral artery    Goals of Care: MAP 60-90; PT/OT; Stepdown    Vital Signs (last 12 hours):   Temp:  [98 °F (36.7 °C)-98.1 °F (36.7 °C)]   Pulse:  []   Resp:  [12-27]   BP: (78-80)/(0)   SpO2:  [97 %-98 %]   Arterial Line BP: (76-88)/(62-75)      Neuro: AAO x4, Follows Commands, and Moves All Extremities    Cardiac: NSR    Respiratory: Room Air    Gtts: Milrinone and Heparin Ancef    Urine Output: Voids Spontaneously 600 cc/shift    Diet: Cardiac Diet    VAD RPM 5100 No alarms     Accuchecks: ac hs.    Skin: intact; no breakdown; turns and repositions self. Oob to chair. Ambulated in room    Shift Events: none.

## 2023-09-04 NOTE — CARE UPDATE
Care Update:     No acute events overnight. Patient on the SICU in room 98379/89759 A. Blood glucose stable. BG at goal on current insulin regimen (SSI + basal). Steroid use- None.    Renal function- Normal   Vasopressors-  None       Diet Cardiac Thin     POCT Glucose   Date Value Ref Range Status   09/03/2023 135 (H) 70 - 110 mg/dL Final   09/03/2023 148 (H) 70 - 110 mg/dL Final   09/03/2023 128 (H) 70 - 110 mg/dL Final   09/03/2023 141 (H) 70 - 110 mg/dL Final   09/02/2023 126 (H) 70 - 110 mg/dL Final   09/02/2023 123 (H) 70 - 110 mg/dL Final   09/02/2023 137 (H) 70 - 110 mg/dL Final   09/02/2023 140 (H) 70 - 110 mg/dL Final   09/02/2023 137 (H) 70 - 110 mg/dL Final   09/02/2023 146 (H) 70 - 110 mg/dL Final   09/01/2023 161 (H) 70 - 110 mg/dL Final   09/01/2023 124 (H) 70 - 110 mg/dL Final     Lab Results   Component Value Date    HGBA1C 6.9 (H) 08/31/2023       Diabetes Medications               insulin aspart U-100 (NOVOLOG) 100 unit/mL (3 mL) InPn pen Inject 14 Units into the skin 3 (three) times daily with meals. Plus Sliding Scale: 151-200: +1, 201-250: +2, 251-300: +3, 301-350: +4 and call MD    insulin detemir U-100, Levemir, 100 unit/mL (3 mL) SubQ InPn pen Inject 24 Units into the skin 2 (two) times daily.          Endocrine  Type 2 diabetes mellitus with hyperglycemia  Endocrinology consulted for BG management.   BG goal 140-180     - Levemir (Insulin Detemir) to 6 units BID  - Novolog (Insulin Aspart) prn for BG excursions [facility] SSI (150/25)  - BG checks ac/hs   - Hypoglycemia protocol in place    ** Please notify Endocrine for any change and/or advance in diet**  ** Please call Endocrine for any BG related issues **    Discharge Planning:   TBD. Please notify endocrinology prior to discharge.      Michael Wyman DNP, FNP-C  Department of Endocrinology  Inpatient Glycemic Management

## 2023-09-04 NOTE — PROGRESS NOTES
09/04/2023  Miracle Caballero    Current provider:  Luca Lopez Jr.*    Device interrogation:      9/4/2023    10:00 AM 9/4/2023     9:00 AM 9/4/2023     8:00 AM 9/4/2023     7:00 AM 9/4/2023     6:00 AM 9/4/2023     5:00 AM 9/4/2023     4:00 AM   TXP LVAD INTERROGATIONS   Type    HeartMate3 HeartMate3 HeartMate3 HeartMate3   Flow 3.9 4.1 3.5 4 4 4.1 3.9   Speed 5100 5100 5100 5100 5100 5100 5100   PI 5.6 5.7 6.4 5.5 5.2 5.4 5.5   Power (Serna) 3.6 3.7 3.7 3.4 3.6 3.6 3.7   LSL 4700 4700 4700 4700 4700 4700 4700   Pulsatility  Intermittent pulse  Intermittent pulse Intermittent pulse Intermittent pulse Intermittent pulse          Rounded on Kevan Queen to ensure all mechanical assist device settings (IABP or VAD) were appropriate and all parameters were within limits.  I was able to ensure all back up equipment was present, the staff had no issues, and the Perfusion Department daily rounding was complete.      For implantable VADs: Interrogation of Ventricular assist device was performed with analysis of device parameters and review of device function. I have personally reviewed the interrogation findings and agree with findings as stated.     In emergency, the nursing units have been notified to contact the perfusion department either by:  Calling [phone] from 630am to 4pm Mon thru Fri, utilizing the On-Call Finder functionality of Epic and searching for Perfusion, or by contacting the hospital  from 4pm to 630am and on weekends and asking to speak with the perfusionist on call.    10:12 AM

## 2023-09-04 NOTE — PT/OT/SLP PROGRESS
"Physical Therapy   Progress Note    Patient Name:  Kevan Queen  MRN: [id number]    Admit Date: 8/31/2023  Admitting Diagnosis:  Embolic stroke involving left middle cerebral artery  Length of Stay: 4 days  Recent Surgery: * No surgery found *      Recommendations:     Discharge Recommendations: Other  Equipment recommendations:  TBD closer to d/c  Barriers to discharge: None Identified     Assessment:     Kevan Queen is a 38 y.o. male admitted to [facility] on 8/31/2023 with medical diagnosis of Embolic stroke involving left middle cerebral artery. Pt presents with impaired endurance, impaired functional mobility, decreased safety awareness, impaired cardiopulmonary response to activity. Pt is progressing towards goals, but has not yet reached prior level of function.     Pt agreeable to therapy session. Supine when therapists entered room. Pt is concerned that he is "naked" and would like shorts or underwear prior to working with therapists. Found personal shorts in bag and patient able to don them while supine. Pt able to stand, ambulate to restroom and perform standing ADLs, and then ambulate around room without difficulty. Pt left up in recliner with all VSS.    Kevan Queen would benefit from continued acute PT intervention to improve quality of life, focus on recovery of impairments, provide patient/caregiver education, reduce fall risk, and maximize (I) and safety with functional mobility. Once medically stable, recommending pt discharge to Other (home).      Rehab Prognosis: Good    Plan:     During this hospitalization, patient to be seen 5 x/week to address the identified rehab impairments via gait training, therapeutic activities, therapeutic exercises, neuromuscular re-education and progress towards stated goals.     Plan of Care Expires:  09/30/23  Plan of Care reviewed with: patient    This plan of care has been discussed with the patient/caregiver, who was included in its development and is in agreement " "with the identified goals and treatment plan.     Subjective     Communicated with RN prior to session.  Patient found supine upon PT entry to room, agreeable to therapy session. Pt' alone during session.    Patient/Family Comments/goals: "I don't have any underwear on"    Pain/Comfort:  Pain Rating 1: 0/10  Pain Rating Post-Intervention 1: 0/10    Patients cultural, spiritual, Uatsdin conflicts given the current situation: None identified     Objective:   OT present for cotreat due to pt's multiple medical comorbidities and functional/cognition deficits requiring two skilled therapists to appropriately progress pt's musculoskeletal strength, neuromuscular control, and endurance while taking into consideration medical acuity and pt safety.    Patient found with: LVAD, central line, pulse ox (continuous), telemetry, blood pressure cuff    General Precautions: Standard, fall, LVAD   Orthopedic Precautions:    Braces:     Oxygen Device: room air    Cognition:  Pt is Alert during session.    Therapist provided skilled verbal and tactile cueing to facilitate the following functional mobility tasks. Listed tasks are focused on recovery of impairments and improving pt's independence and efficiency with bed mobility, transfers and ambulation as able.     Bed Mobility:  Supine > Sit: Stand-by Assistance    Transfers:   Sit <> Stand Transfer: Stand-by Assistance from eob with no AD                  Gait:  Distance: 8 + 20 ft  Assistance level: Stand-by Assistance  Assistive Device: none  Gait Assessment: decreased step length , decreased william, and decreased gait speed    Balance:  Dynamic Sitting: GOOD: Maintains balance through MODERATE excursions of active trunk movement  Standing:  Static: GOOD: Takes MODERATE challenges from all directions   Dynamic: FAIR+: Needs CLOSE SUPERVISION during gait and is able to right self with minor LOB    Outcome Measure: AM-PAC 6 CLICK MOBILITY  Total Score:18     Patient/Caregiver " Education and Additional Therapeutic Activities/Exercises       Provided pt/caregiver education regarding:   PT POC and goals for therapy   Safety with mobility and fall risk   Safe management of AD as needed   Energy conservation techniques   Instruction on use of call button and importance of calling nursing staff for assistance with mobility     Patient/caregiver able to verbalize understanding; will follow-up with pt/caregiver during current admit for additional questions/concerns within scope of practice.     White board updated.     Patient left up in chair with all lines intact, call button in reach, and RN notified.    Goals:     Multidisciplinary Problems       Physical Therapy Goals          Problem: Physical Therapy    Goal Priority Disciplines Outcome Goal Variances Interventions   Physical Therapy Goal     PT, PT/OT Ongoing, Progressing     Description: Goals to be met by: 23     Patient will increase functional independence with mobility by performin. Supine to sit with Modified [place]  2. Sit to stand transfer with Supervision  3. Gait  x 250 feet with Stand-by Assistance using AD if needed.   4. Pt transfer bed to bedside chair with SBA. .                          Time Tracking:       PT Received On: 23  PT Start Time: 924     PT Stop Time: 938  PT Total Time (min): 14 min     Billable Minutes: Gait Training 14    2023

## 2023-09-04 NOTE — PLAN OF CARE
SICU PLAN OF CARE NOTE    Dx: Embolic stroke involving left middle cerebral artery    Goals of Care: MAP > 65 and < 95, Q1H Neurovascular assessments    Vital Signs (last 12 hours):   Temp:  [98.3 °F (36.8 °C)-98.8 °F (37.1 °C)]   Pulse:  []   Resp:  [14-33]   BP: (78-96)/(0)   SpO2:  [91 %-98 %]   Arterial Line BP: ()/(60-85)      Neuro: Follows Commands, Moves All Extremities, and Confused     Cardiac: HR 's    LVAD HM3  Flow: 3.9-4.3  Speed: 5100  PI: 3.7-5.7  Power: 3.6-3.8  LSL: 4700  Alarms: No Alarms     Respiratory: Room Air     Gtts: Milrinone, Heparin, and Continuous Ancef      Urine Output: Voids Spontaneously 575 cc/shift with 1 unmeasured occurence     Drains: None     Diet: Cardiac Diet     Labs/Accuchecks: Daily Labs / Accuchecks ACHS     Skin: No signs of skin breakdown or redness noted to bony prominences. Sacral and heel foam dressings in place. Waffle overlay on and inflated. Abdulaziz bed plugged in and working.    Shift Events: No acute events throughout the shift. Transfer orders placed and awaiting bed placement.    Plan of care reviewed with his significant other. All questions answered at this time.

## 2023-09-04 NOTE — PLAN OF CARE
SICU PLAN OF CARE NOTE    Dx: Embolic stroke involving left middle cerebral artery    Goals of Care: remove a-line, SD    Vital Signs (last 12 hours):   Temp:  [98 °F (36.7 °C)-98.4 °F (36.9 °C)]   Pulse:  []   Resp:  [10-20]   BP: (80)/(0)   SpO2:  [96 %-100 %]   Arterial Line BP: (77-97)/(65-82)      Neuro: Follows Commands, Moves All Extremities, and Confused    Cardiac:     Respiratory: Room Air    Gtts: Milrinone, Heparin, and Continuous Ancef     Urine Output: Voids Spontaneously 1025 cc/shift    Drains: none    Diet: Cardiac Diet and Diabetic Diet    LVAD Speed 5100             Flows 3.8-4.2             PI        3.5-6.5             PO      3.5-3.7     Labs/Accuchecks: daily/achs.    Skin: no new breakdown, specialty bed in use, pt able to shift weight independently    Shift Events: NAEON

## 2023-09-04 NOTE — SUBJECTIVE & OBJECTIVE
Interval History: Most recent EEG report negative for epileptiform discharges.  Patient reports no new complaints this morning.  HE is +704cc in the last 24 hours and feels well.  CVP: 4 so will continue to hold Lasix.  Echo ordered on 9/3 still pending. K was 3.0 this morning and was replaced.  Has orders to transfer to the floor; just waiting for bed.  Will remove A-line      Lines:  PICC: 8/1      Continuous Infusions:   heparin (porcine) in D5W 16 Units/kg/hr (09/04/23 1000)    milrinone 20mg/100ml D5W (200mcg/ml) 0.25 mcg/kg/min (09/04/23 1000)     Scheduled Meds:   aspirin  81 mg Oral Daily    atorvastatin  40 mg Oral Daily    busPIRone  10 mg Oral BID    ceFAZolin (ANCEF) 8 g in dextrose 5 % (D5W) 500 mL CONTINUOUS INFUSION  8 g Intravenous Q24H    insulin detemir U-100  6 Units Subcutaneous BID    levETIRAcetam  1,000 mg Oral BID    magnesium sulfate IVPB  2 g Intravenous Once    mirtazapine  15 mg Oral Nightly    mupirocin   Nasal BID    [START ON 9/5/2023] pantoprazole  40 mg Oral Daily    polyethylene glycol  17 g Oral Daily    senna  8.6 mg Oral Daily    valsartan  40 mg Oral BID    warfarin  4 mg Oral Daily     PRN Meds:sodium chloride 0.9%, dextrose 10%, dextrose 10%, glucagon (human recombinant), glucose, glucose, insulin aspart U-100, sodium chloride 0.9%    Review of patient's allergies indicates:   Allergen Reactions    Bumex [bumetanide] Hives    Torsemide Hives     Objective:     Vital Signs (Most Recent):  Temp: 98 °F (36.7 °C) (09/04/23 0700)  Pulse: 103 (09/04/23 1000)  Resp: 14 (09/04/23 1000)  BP: (!) 80/0 (09/04/23 0700)  SpO2: 97 % (09/04/23 0900) Vital Signs (24h Range):  Temp:  [98 °F (36.7 °C)-98.8 °F (37.1 °C)] 98 °F (36.7 °C)  Pulse:  [] 103  Resp:  [10-33] 14  SpO2:  [95 %-100 %] 97 %  BP: (78-82)/(0) 80/0  Arterial Line BP: (73-99)/(60-85) 76/62     Patient Vitals for the past 72 hrs (Last 3 readings):   Weight   09/04/23 0500 90 kg (198 lb 6.6 oz)   09/03/23 0500 90 kg (198  lb 6.6 oz)   09/02/23 0500 90.7 kg (200 lb)       Body mass index is 24.8 kg/m².      Intake/Output Summary (Last 24 hours) at 9/4/2023 1042  Last data filed at 9/4/2023 1000  Gross per 24 hour   Intake 2679.09 ml   Output 1600 ml   Net 1079.09 ml         Hemodynamic Parameters:              Physical Exam  Constitutional:       Appearance: Normal appearance.      Comments: Intubated and sedated   HENT:      Head: Normocephalic and atraumatic.   Eyes:      Conjunctiva/sclera: Conjunctivae normal.   Neck:      Vascular: No JVD.      Comments: No JVP elevation   Cardiovascular:      Comments: VAD hum appreciated  Pulmonary:      Breath sounds: Normal breath sounds.      Comments: Clear to auscultation  Abdominal:      Comments: Soft, non-tender, non-distended   Musculoskeletal:      Cervical back: Normal range of motion.      Comments: No peripheral edema   Skin:     General: Skin is warm and dry.      Capillary Refill: Capillary refill takes less than 2 seconds.   Neurological:      Mental Status: He is oriented to person, place, and time.            Significant Labs:  CBC:  Recent Labs   Lab 09/02/23  0304 09/03/23  0410 09/04/23  0315   WBC 11.31 10.29 8.64   RBC 3.75* 3.86* 4.00*   HGB 10.1* 10.4* 10.8*   HCT 32.9* 33.7* 34.2*    292 303   MCV 88 87 86   MCH 26.9* 26.9* 27.0   MCHC 30.7* 30.9* 31.6*       BNP:  Recent Labs   Lab 08/31/23  1046   *       CMP:  Recent Labs   Lab 09/02/23  0304 09/02/23  2316 09/03/23  0410 09/04/23  0315   * 136* 132* 145*   CALCIUM 8.7 8.4* 8.5* 8.7   ALBUMIN 2.7*  --  2.5* 2.6*   PROT 7.6  --  6.9 7.3    136 136 135*   K 3.4* 2.9* 4.0 3.0*   CO2 28 26 25 26   CL 99 98 98 100   BUN 7 10 10 7   CREATININE 0.9 1.2 1.0 1.0   ALKPHOS 97  --  94 100   ALT <5*  --  <5* <5*   AST 19  --  17 16   BILITOT 1.1*  --  1.0 0.8        Coagulation:   Recent Labs   Lab 09/02/23  0304 09/02/23  1009 09/02/23  2258 09/03/23  0410 09/04/23  0315   INR 1.3*  --   --  1.2 1.3*    APTT 33.4*  33.4*   < > 42.2* 43.5*  43.5* 44.4*  44.4*    < > = values in this interval not displayed.       LDH:  Recent Labs   Lab 09/02/23  0304 09/03/23  0410 09/04/23  0315   * 268* 279*       Microbiology:  Microbiology Results (last 7 days)       ** No results found for the last 168 hours. **            I have reviewed all pertinent labs within the past 24 hours.    Estimated Creatinine Clearance: 119.7 mL/min (based on SCr of 1 mg/dL).    Diagnostic Results:  I have reviewed all pertinent imaging results/findings within the past 24 hours.

## 2023-09-04 NOTE — PROGRESS NOTES
[facility]  Heart Transplant  Progress Note    Patient Name: Kevan Queen  MRN: [id number]  Admission Date: 8/31/2023  Hospital Length of Stay: 4 days  Attending Physician: Luca Lopez Jr.*  Primary Care Provider: Rosio Armendariz FNP  Principal Problem:Embolic stroke involving left middle cerebral artery    Subjective:     Interval History: Most recent EEG report negative for epileptiform discharges.  Patient reports no new complaints this morning.  HE is +704cc in the last 24 hours and feels well.  CVP: 4 so will continue to hold Lasix.  Echo ordered on 9/3 still pending. K was 3.0 this morning and was replaced.  Has orders to transfer to the floor; just waiting for bed.  Will remove A-line      Lines:  PICC: 8/1      Continuous Infusions:   heparin (porcine) in D5W 16 Units/kg/hr (09/04/23 1000)    milrinone 20mg/100ml D5W (200mcg/ml) 0.25 mcg/kg/min (09/04/23 1000)     Scheduled Meds:   aspirin  81 mg Oral Daily    atorvastatin  40 mg Oral Daily    busPIRone  10 mg Oral BID    ceFAZolin (ANCEF) 8 g in dextrose 5 % (D5W) 500 mL CONTINUOUS INFUSION  8 g Intravenous Q24H    insulin detemir U-100  6 Units Subcutaneous BID    levETIRAcetam  1,000 mg Oral BID    magnesium sulfate IVPB  2 g Intravenous Once    mirtazapine  15 mg Oral Nightly    mupirocin   Nasal BID    [START ON 9/5/2023] pantoprazole  40 mg Oral Daily    polyethylene glycol  17 g Oral Daily    senna  8.6 mg Oral Daily    valsartan  40 mg Oral BID    warfarin  4 mg Oral Daily     PRN Meds:sodium chloride 0.9%, dextrose 10%, dextrose 10%, glucagon (human recombinant), glucose, glucose, insulin aspart U-100, sodium chloride 0.9%    Review of patient's allergies indicates:   Allergen Reactions    Bumex [bumetanide] Hives    Torsemide Hives     Objective:     Vital Signs (Most Recent):  Temp: 98 °F (36.7 °C) (09/04/23 0700)  Pulse: 103 (09/04/23 1000)  Resp: 14 (09/04/23 1000)  BP: (!) 80/0 (09/04/23  0700)  SpO2: 97 % (09/04/23 0900) Vital Signs (24h Range):  Temp:  [98 °F (36.7 °C)-98.8 °F (37.1 °C)] 98 °F (36.7 °C)  Pulse:  [] 103  Resp:  [10-33] 14  SpO2:  [95 %-100 %] 97 %  BP: (78-82)/(0) 80/0  Arterial Line BP: (73-99)/(60-85) 76/62     Patient Vitals for the past 72 hrs (Last 3 readings):   Weight   09/04/23 0500 90 kg (198 lb 6.6 oz)   09/03/23 0500 90 kg (198 lb 6.6 oz)   09/02/23 0500 90.7 kg (200 lb)       Body mass index is 24.8 kg/m².      Intake/Output Summary (Last 24 hours) at 9/4/2023 1042  Last data filed at 9/4/2023 1000  Gross per 24 hour   Intake 2679.09 ml   Output 1600 ml   Net 1079.09 ml         Hemodynamic Parameters:              Physical Exam  Constitutional:       Appearance: Normal appearance.      Comments: Intubated and sedated   HENT:      Head: Normocephalic and atraumatic.   Eyes:      Conjunctiva/sclera: Conjunctivae normal.   Neck:      Vascular: No JVD.      Comments: No JVP elevation   Cardiovascular:      Comments: VAD hum appreciated  Pulmonary:      Breath sounds: Normal breath sounds.      Comments: Clear to auscultation  Abdominal:      Comments: Soft, non-tender, non-distended   Musculoskeletal:      Cervical back: Normal range of motion.      Comments: No peripheral edema   Skin:     General: Skin is warm and dry.      Capillary Refill: Capillary refill takes less than 2 seconds.   Neurological:      Mental Status: He is oriented to person, place, and time.            Significant Labs:  CBC:  Recent Labs   Lab 09/02/23  0304 09/03/23  0410 09/04/23  0315   WBC 11.31 10.29 8.64   RBC 3.75* 3.86* 4.00*   HGB 10.1* 10.4* 10.8*   HCT 32.9* 33.7* 34.2*    292 303   MCV 88 87 86   MCH 26.9* 26.9* 27.0   MCHC 30.7* 30.9* 31.6*       BNP:  Recent Labs   Lab 08/31/23  1046   *       CMP:  Recent Labs   Lab 09/02/23  0304 09/02/23  2316 09/03/23  0410 09/04/23  0315   * 136* 132* 145*   CALCIUM 8.7 8.4* 8.5* 8.7   ALBUMIN 2.7*  --  2.5* 2.6*   PROT  7.6  --  6.9 7.3    136 136 135*   K 3.4* 2.9* 4.0 3.0*   CO2 28 26 25 26   CL 99 98 98 100   BUN 7 10 10 7   CREATININE 0.9 1.2 1.0 1.0   ALKPHOS 97  --  94 100   ALT <5*  --  <5* <5*   AST 19  --  17 16   BILITOT 1.1*  --  1.0 0.8        Coagulation:   Recent Labs   Lab 09/02/23  0304 09/02/23  1009 09/02/23  2258 09/03/23  0410 09/04/23  0315   INR 1.3*  --   --  1.2 1.3*   APTT 33.4*  33.4*   < > 42.2* 43.5*  43.5* 44.4*  44.4*    < > = values in this interval not displayed.       LDH:  Recent Labs   Lab 09/02/23  0304 09/03/23  0410 09/04/23  0315   * 268* 279*       Microbiology:  Microbiology Results (last 7 days)       ** No results found for the last 168 hours. **            I have reviewed all pertinent labs within the past 24 hours.    Estimated Creatinine Clearance: 119.7 mL/min (based on SCr of 1 mg/dL).    Diagnostic Results:  I have reviewed all pertinent imaging results/findings within the past 24 hours.    Assessment and Plan:     Mr. 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 He was reportedly not taking his coumadin or keppra for the past week due to running out. As per chart review, patient started to experience symptoms around 1am when he went to sleep. Symptoms worsened and EMS was called around 3:30am. Upon admission patient underwent a CT head showed an acute to subacute infarct with possible petechial hemorrhage. Labs were significant for INR of 1.2. Patient was loaded with Keppra and transferred to [facility] for further care.      Upon arrival, patient was found to be hemodynamically stable w/ MAPs of 106. However, he was noted to be not alert or oriented and extremely lethargic. Patient was noted to have  GCS 10-11 with minimally reactive pupils. Patient underwent stat CT brain which showed no acute changes since prior CT, and transferred to the ICU for closer monitoring. Once in the ICU patient was intubated for airway protection.      Of note, patient was most recently hospitalized from 7/22/2023-8/8/2023 for sepsis/covod. Found to have loculated pleural effussion requiring chest tube placement and three doses of lytics. Effussion improved and patient was discharged with 6 week course of Ancef (to end on 9/14).        Home Medications:    Aspirin 81mg daily   Buspirone 10mg bid   Furosemide 80mg daily   Insulin detemir 25 units bid   Insulin aspart 14 units w/ meals   Milrinone 0.25   Mirtazapine 15mg nightly   Valsartan 40mg bi   Warfarin      Cardiac Imaging:    Echo (7/31/2023): HM3 at 5100. AV does not open. IV septum midline. EF 10-15%. LV severely dilated. Normal wall thickness. Severe global hypokinesis. RV not well visualized due to poor acoustic window. Biatrial enlargment. Mod TR.        * Embolic stroke involving left middle cerebral artery  - CTH with L parietal hypodensity with subtle areas of hyperdensity concerning for acute/subacute infarct with petechial hemorrhage, CTA negative for LVO or critical stenosis. Determined not a candidate for acute interventions with thrombolytics/thrombectomy.  - As per vascular neuro, continue aspirin and statin. Repeat CT unremarkable so gave clearance to resume anticoagulation.  -On heparin and resumed Coumadin   - PT, OT, Speech consulted.     LVAD (left ventricular assist device) present  -HeartMate 3 Implanted on 6/29/2022 as DT with RV failure on milrinone at 0.25 mcg/kg/min.  -Coumadin held due to concern for petechial hemorrhage. Repeat CT stable so started back on heparin.  Goal INR 2.0-3.0. On heparin and coumadin.  INR subtherapeutic today at 1.3  -LDH is stable.  Will continue to monitor daily  -Speed set at 5100, LSL 4700 rpm  -Echo: 7/31/23 EF: 10-15%,  LVEDD: 6.87 cm.  No vegation appreciated.  Echo ordered     Procedure: Device Interrogation Including analysis of device parameters  Current Settings: Ventricular Assist Device  Review of device function is stable        9/4/2023    10:00 AM 9/4/2023     9:00 AM 9/4/2023     8:00 AM 9/4/2023     7:00 AM 9/4/2023     6:00 AM 9/4/2023     5:00 AM 9/4/2023     4:00 AM   TXP LVAD INTERROGATIONS   Type    HeartMate3 HeartMate3 HeartMate3 HeartMate3   Flow 3.9 4.1 3.5 4 4 4.1 3.9   Speed 5100 5100 5100 5100 5100 5100 5100   PI 5.6 5.7 6.4 5.5 5.2 5.4 5.5   Power (Serna) 3.6 3.7 3.7 3.4 3.6 3.6 3.7   LSL 4700 4700 4700 4700 4700 4700 4700   Pulsatility  Intermittent pulse  Intermittent pulse Intermittent pulse Intermittent pulse Intermittent pulse       Acute combined systolic and diastolic congestive heart failure  -NICM s/p LVAD and on Milrinone   - Last Echo (7/31/2023): HM3 at 5100. AV does not open. IV septum midline. EF 10-15%. LV severely dilated. Normal wall thickness. Severe global hypokinesis. RV not well visualized due to poor acoustic window. Biatrial enlargment. Mod TR.    -diuresed on IV Lasix with good response.  He is net negative 5.0L since admission.  IV lasix stopped and CVP: 4 this am.  Will continue to hold Lasix.   -Will get echo repeat echo given LFA morning of 9/2  - Home GDMT: valsartan 40mg bid  - Home diuretic regimen: Lasix 80mg daily.   - Ionotropes: continue home milrinone 0.25.   - Strict I&Os and daily weights.   - Maintain K>4 and Mg>2    Seizure-like activity  - loaded with keppra in ED prior to arrival.   - EEG unremarkable, but remained on overnight. Will continue keppra 1000mg bid as per Neuro recs   - Neurology consulted. Appreciate recommendations.     Bacteremia due to Staphylococcus  -H/O chronic MSSA DLES infection for which he is on Doxycycline at home  -Blood cxs here +ve for MSSA through 7/28. Repeated 7/29 with NGTD. Repeated again 7/30 NGTD  -PICC line replaced 8/1  -ECHO done  7/31 and no vegetations appreciated   -CT 8/1 with loculated fluid along Left lateral hear border, thoracic surgery consulted, not a candidate for VATS. Chest tube placed with IR 8/3, pleural fluid cultures positive for GPC  -Continue Ancef 8g IV q 24 hours for total of 6 weeks with end date 9/14 (6 weeks after chest tube placement).     Type 2 diabetes mellitus with hyperglycemia  - management as per endocrinology.     On mechanically assisted ventilation  -resolved  -Patient self extubated 9/1  -Mechanically ventillatied for airway protection in the setting of AMS w/ stroke.   - Pulmonology consulted to assist with management. Appreciate their assistance       DEYA Martinez  Heart Transplant  [facility]

## 2023-09-04 NOTE — PLAN OF CARE
Problem: Adult Inpatient Plan of Care  Goal: Plan of Care Review  Outcome: Ongoing, Progressing     Problem: Adult Inpatient Plan of Care  Goal: Patient-Specific Goal (Individualized)  Outcome: Ongoing, Progressing     Problem: Adult Inpatient Plan of Care  Goal: Absence of Hospital-Acquired Illness or Injury  Outcome: Ongoing, Progressing     Problem: Adult Inpatient Plan of Care  Goal: Optimal Comfort and Wellbeing  Outcome: Ongoing, Progressing     Problem: Adult Inpatient Plan of Care  Goal: Readiness for Transition of Care  Outcome: Ongoing, Progressing     Problem: Diabetes Comorbidity  Goal: Blood Glucose Level Within Targeted Range  Outcome: Ongoing, Progressing     Problem: Glycemic Control Impaired (Sepsis/Septic Shock)  Goal: Blood Glucose Level Within Desired Range  Outcome: Ongoing, Progressing     Problem: Skin Injury Risk Increased  Goal: Skin Health and Integrity  Outcome: Ongoing, Progressing     Problem: Adjustment to Illness (Stroke, Ischemic/Transient Ischemic Attack)  Goal: Optimal Coping  Outcome: Ongoing, Progressing     Problem: Cognitive Impairment (Stroke, Ischemic/Transient Ischemic Attack)  Goal: Optimal Cognitive Function  Outcome: Ongoing, Progressing     Problem: Cerebral Tissue Perfusion (Stroke, Ischemic/Transient Ischemic Attack)  Goal: Optimal Cerebral Tissue Perfusion  Outcome: Ongoing, Progressing     Problem: Communication Impairment (Stroke, Ischemic/Transient Ischemic Attack)  Goal: Improved Communication Skills  Outcome: Ongoing, Progressing     Problem: Functional Ability Impaired (Stroke, Ischemic/Transient Ischemic Attack)  Goal: Optimal Functional Ability  Outcome: Ongoing, Progressing

## 2023-09-04 NOTE — ASSESSMENT & PLAN NOTE
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  -Coumadin held due to concern for petechial hemorrhage. Repeat CT stable so started back on heparin.  Goal INR 2.0-3.0. On heparin and coumadin.  INR subtherapeutic today at 1.3  -LDH is stable.  Will continue to monitor daily  -Speed set at 5100, LSL 4700 rpm  -Echo: 7/31/23 EF: 10-15%, LVEDD: 6.87 cm.  No vegation appreciated.  Echo ordered     Procedure: Device Interrogation Including analysis of device parameters  Current Settings: Ventricular Assist Device  Review of device function is stable        9/4/2023    10:00 AM 9/4/2023     9:00 AM 9/4/2023     8:00 AM 9/4/2023     7:00 AM 9/4/2023     6:00 AM 9/4/2023     5:00 AM 9/4/2023     4:00 AM   TXP LVAD INTERROGATIONS   Type    HeartMate3 HeartMate3 HeartMate3 HeartMate3   Flow 3.9 4.1 3.5 4 4 4.1 3.9   Speed 5100 5100 5100 5100 5100 5100 5100   PI 5.6 5.7 6.4 5.5 5.2 5.4 5.5   Power (Serna) 3.6 3.7 3.7 3.4 3.6 3.6 3.7   LSL 4700 4700 4700 4700 4700 4700 4700   Pulsatility  Intermittent pulse  Intermittent pulse Intermittent pulse Intermittent pulse Intermittent pulse

## 2023-09-04 NOTE — PT/OT/SLP PROGRESS
Occupational Therapy   Co-Treatment    Name: Kevan Queen  MRN: [id number]  Admitting Diagnosis:  Embolic stroke involving left middle cerebral artery       Recommendations:     Discharge Recommendations: other (see comments) (Home)  Discharge Equipment Recommendations:  none  Barriers to discharge:  None    Assessment:     Kevan Queen is a 38 y.o. male with a medical diagnosis of Embolic stroke involving left middle cerebral artery.  He presents progressing towards goals, but not at PLOF. Pt is able to mobilize around room and complete ADL with SBA mostly for line management and safety cueing. Performance deficits affecting function are impaired self care skills, decreased safety awareness, impaired functional mobility, impaired endurance, impaired cardiopulmonary response to activity. Pt benefits from co-treatment with PT to accommodate pt's activity tolerance and progression with therapy.      Rehab Prognosis:  Good; patient would benefit from acute skilled OT services to address these deficits and reach maximum level of function.       Plan:     Patient to be seen 3 x/week to address the above listed problems via self-care/home management, therapeutic activities, therapeutic exercises  Plan of Care Expires:    Plan of Care Reviewed with: patient    Subjective     Chief Complaint: denies  Patient/Family Comments/goals: to go home  Pain/Comfort:  Pain Rating 1: 0/10  Pain Rating Post-Intervention 1: 0/10    Objective:     Communicated with: RN prior to session.  Patient found supine with blood pressure cuff, pulse ox (continuous), telemetry, LVAD, central line upon OT entry to room.    General Precautions: Standard, fall    Orthopedic Precautions:N/A  Braces: N/A  Respiratory Status: Room air     Occupational Performance:     Bed Mobility:    Independent     Functional Mobility/Transfers:  Patient completed Sit <> Stand Transfer with stand by assistance  with  no assistive device   Functional Mobility: SBA around  room during ADL    Activities of Daily Living:  Feeding:  independence    Grooming: independence    Upper Body Dressing: modified independence    Lower Body Dressing: supervision    Toileting: stand by assistance        [facility] 6 Click ADL: 22    Treatment & Education:  Pt ed on OT POC  Pt ed on safety and need for assistance 2* medical lines    Patient left up in chair with all lines intact, call button in reach, and RN notified    GOALS:   Multidisciplinary Problems       Occupational Therapy Goals          Problem: Occupational Therapy    Goal Priority Disciplines Outcome Interventions   Occupational Therapy Goal     OT, PT/OT Ongoing, Progressing    Description: Goals to be met by: 9/22/2023     Patient will increase functional independence with ADLs by performing:    UE Dressing with Set-up Assistance.  Grooming while standing at sink with Modified Hart.  Toileting from toilet with Modified Hart for hygiene and clothing management.   Stand pivot transfers with Modified Hart.  Toilet transfer to toilet with Modified Hart.                         Time Tracking:     OT Date of Treatment: 09/04/23  OT Start Time: 0857  OT Stop Time: 0915  OT Total Time (min): 18 min    Billable Minutes:Self Care/Home Management 15               9/4/2023

## 2023-09-05 PROBLEM — E44.0 MODERATE MALNUTRITION: Status: ACTIVE | Noted: 2023-09-05

## 2023-09-05 LAB
ALBUMIN SERPL BCP-MCNC: 2.4 G/DL (ref 3.5–5.2)
ALP SERPL-CCNC: 94 U/L (ref 55–135)
ALT SERPL W/O P-5'-P-CCNC: <5 U/L (ref 10–44)
ANION GAP SERPL CALC-SCNC: 9 MMOL/L (ref 8–16)
APTT PPP: 39.6 SEC (ref 21–32)
APTT PPP: 47.8 SEC (ref 21–32)
APTT PPP: 54.4 SEC (ref 21–32)
ASCENDING AORTA: 2.99 CM
AST SERPL-CCNC: 17 U/L (ref 10–40)
BASOPHILS # BLD AUTO: 0.04 K/UL (ref 0–0.2)
BASOPHILS NFR BLD: 0.5 % (ref 0–1.9)
BILIRUB SERPL-MCNC: 0.6 MG/DL (ref 0.1–1)
BSA FOR ECHO PROCEDURE: 2.18 M2
BUN SERPL-MCNC: 4 MG/DL (ref 6–20)
CALCIUM SERPL-MCNC: 8.4 MG/DL (ref 8.7–10.5)
CHLORIDE SERPL-SCNC: 100 MMOL/L (ref 95–110)
CO2 SERPL-SCNC: 26 MMOL/L (ref 23–29)
CREAT SERPL-MCNC: 1.1 MG/DL (ref 0.5–1.4)
CV ECHO LV RWT: 0.16 CM
DIFFERENTIAL METHOD: ABNORMAL
DOP CALC LVOT AREA: 4.1 CM2
DOP CALC LVOT DIAMETER: 2.28 CM
DOP CALC MV VTI: 17.13 CM
E WAVE DECELERATION TIME: 113.67 MSEC
E/A RATIO: 1.05
E/E' RATIO: 14.22 M/S
ECHO LV POSTERIOR WALL: 0.57 CM (ref 0.6–1.1)
EOSINOPHIL # BLD AUTO: 0.1 K/UL (ref 0–0.5)
EOSINOPHIL NFR BLD: 1.5 % (ref 0–8)
ERYTHROCYTE [DISTWIDTH] IN BLOOD BY AUTOMATED COUNT: 18.9 % (ref 11.5–14.5)
EST. GFR  (NO RACE VARIABLE): >60 ML/MIN/1.73 M^2
FRACTIONAL SHORTENING: 1 % (ref 28–44)
GLUCOSE SERPL-MCNC: 264 MG/DL (ref 70–110)
HCT VFR BLD AUTO: 34 % (ref 40–54)
HGB BLD-MCNC: 10.5 G/DL (ref 14–18)
IMM GRANULOCYTES # BLD AUTO: 0.02 K/UL (ref 0–0.04)
IMM GRANULOCYTES NFR BLD AUTO: 0.3 % (ref 0–0.5)
INR PPP: 1.4 (ref 0.8–1.2)
INTERVENTRICULAR SEPTUM: 0.75 CM (ref 0.6–1.1)
LA MAJOR: 6.27 CM
LA MINOR: 5.13 CM
LA WIDTH: 4.09 CM
LDH SERPL L TO P-CCNC: 226 U/L (ref 110–260)
LEFT ATRIUM SIZE: 2.8 CM
LEFT ATRIUM VOLUME INDEX: 25.1 ML/M2
LEFT ATRIUM VOLUME: 54.93 CM3
LEFT INTERNAL DIMENSION IN SYSTOLE: 7.01 CM (ref 2.1–4)
LEFT VENTRICLE DIASTOLIC VOLUME INDEX: 120.75 ML/M2
LEFT VENTRICLE DIASTOLIC VOLUME: 264.44 ML
LEFT VENTRICLE MASS INDEX: 91 G/M2
LEFT VENTRICLE SYSTOLIC VOLUME INDEX: 117 ML/M2
LEFT VENTRICLE SYSTOLIC VOLUME: 256.33 ML
LEFT VENTRICULAR INTERNAL DIMENSION IN DIASTOLE: 7.11 CM (ref 3.5–6)
LEFT VENTRICULAR MASS: 199.99 G
LV LATERAL E/E' RATIO: 12.8 M/S
LV SEPTAL E/E' RATIO: 16 M/S
LYMPHOCYTES # BLD AUTO: 1.7 K/UL (ref 1–4.8)
LYMPHOCYTES NFR BLD: 21.5 % (ref 18–48)
MAGNESIUM SERPL-MCNC: 1.7 MG/DL (ref 1.6–2.6)
MCH RBC QN AUTO: 27.2 PG (ref 27–31)
MCHC RBC AUTO-ENTMCNC: 30.9 G/DL (ref 32–36)
MCV RBC AUTO: 88 FL (ref 82–98)
MONOCYTES # BLD AUTO: 0.8 K/UL (ref 0.3–1)
MONOCYTES NFR BLD: 10 % (ref 4–15)
MV MEAN GRADIENT: 1 MMHG
MV PEAK A VEL: 0.61 M/S
MV PEAK E VEL: 0.64 M/S
MV PEAK GRADIENT: 3 MMHG
MV STENOSIS PRESSURE HALF TIME: 32.97 MS
MV VALVE AREA P 1/2 METHOD: 6.67 CM2
NEUTROPHILS # BLD AUTO: 5.2 K/UL (ref 1.8–7.7)
NEUTROPHILS NFR BLD: 66.2 % (ref 38–73)
NRBC BLD-RTO: 0 /100 WBC
PHOSPHATE SERPL-MCNC: 3.2 MG/DL (ref 2.7–4.5)
PISA TR MAX VEL: 2.72 M/S
PLATELET # BLD AUTO: 298 K/UL (ref 150–450)
PMV BLD AUTO: 9.9 FL (ref 9.2–12.9)
POCT GLUCOSE: 137 MG/DL (ref 70–110)
POCT GLUCOSE: 145 MG/DL (ref 70–110)
POCT GLUCOSE: 156 MG/DL (ref 70–110)
POCT GLUCOSE: 157 MG/DL (ref 70–110)
POCT GLUCOSE: 208 MG/DL (ref 70–110)
POTASSIUM SERPL-SCNC: 4 MMOL/L (ref 3.5–5.1)
PROT SERPL-MCNC: 6.7 G/DL (ref 6–8.4)
PROTHROMBIN TIME: 14.7 SEC (ref 9–12.5)
RA MAJOR: 5.87 CM
RA PRESSURE ESTIMATED: 8 MMHG
RA WIDTH: 4.79 CM
RBC # BLD AUTO: 3.86 M/UL (ref 4.6–6.2)
RIGHT VENTRICULAR END-DIASTOLIC DIMENSION: 6.66 CM
RV TB RVSP: 11 MMHG
RV TISSUE DOPPLER FREE WALL SYSTOLIC VELOCITY 1 (APICAL 4 CHAMBER VIEW): 7.57 CM/S
SINUS: 3.04 CM
SODIUM SERPL-SCNC: 135 MMOL/L (ref 136–145)
STJ: 2.54 CM
TDI LATERAL: 0.05 M/S
TDI SEPTAL: 0.04 M/S
TDI: 0.05 M/S
TR MAX PG: 30 MMHG
TRICUSPID ANNULAR PLANE SYSTOLIC EXCURSION: 1.16 CM
TV REST PULMONARY ARTERY PRESSURE: 38 MMHG
WBC # BLD AUTO: 7.9 K/UL (ref 3.9–12.7)
Z-SCORE OF LEFT VENTRICULAR DIMENSION IN END DIASTOLE: -0.4
Z-SCORE OF LEFT VENTRICULAR DIMENSION IN END SYSTOLE: 3.51

## 2023-09-05 PROCEDURE — 85025 COMPLETE CBC W/AUTO DIFF WBC: CPT | Performed by: INTERNAL MEDICINE

## 2023-09-05 PROCEDURE — 97530 THERAPEUTIC ACTIVITIES: CPT

## 2023-09-05 PROCEDURE — 97535 SELF CARE MNGMENT TRAINING: CPT

## 2023-09-05 PROCEDURE — 80053 COMPREHEN METABOLIC PANEL: CPT | Performed by: INTERNAL MEDICINE

## 2023-09-05 PROCEDURE — 94761 N-INVAS EAR/PLS OXIMETRY MLT: CPT

## 2023-09-05 PROCEDURE — 25000003 PHARM REV CODE 250: Performed by: PHYSICIAN ASSISTANT

## 2023-09-05 PROCEDURE — 92507 TX SP LANG VOICE COMM INDIV: CPT

## 2023-09-05 PROCEDURE — 83615 LACTATE (LD) (LDH) ENZYME: CPT | Performed by: PHYSICIAN ASSISTANT

## 2023-09-05 PROCEDURE — 83735 ASSAY OF MAGNESIUM: CPT | Performed by: INTERNAL MEDICINE

## 2023-09-05 PROCEDURE — 93750 INTERROGATION VAD IN PERSON: CPT | Mod: ,,, | Performed by: INTERNAL MEDICINE

## 2023-09-05 PROCEDURE — 25000003 PHARM REV CODE 250: Performed by: INTERNAL MEDICINE

## 2023-09-05 PROCEDURE — 99233 SBSQ HOSP IP/OBS HIGH 50: CPT | Mod: ,,, | Performed by: REGISTERED NURSE

## 2023-09-05 PROCEDURE — 85610 PROTHROMBIN TIME: CPT | Performed by: INTERNAL MEDICINE

## 2023-09-05 PROCEDURE — 84100 ASSAY OF PHOSPHORUS: CPT | Performed by: INTERNAL MEDICINE

## 2023-09-05 PROCEDURE — 99233 PR SUBSEQUENT HOSPITAL CARE,LEVL III: ICD-10-PCS | Mod: ,,, | Performed by: REGISTERED NURSE

## 2023-09-05 PROCEDURE — 63600175 PHARM REV CODE 636 W HCPCS: Performed by: PHYSICIAN ASSISTANT

## 2023-09-05 PROCEDURE — 93750 PR INTERROGATE VENT ASSIST DEV, IN PERSON, W PHYSICIAN ANALYSIS: ICD-10-PCS | Mod: ,,, | Performed by: INTERNAL MEDICINE

## 2023-09-05 PROCEDURE — 63600175 PHARM REV CODE 636 W HCPCS: Performed by: STUDENT IN AN ORGANIZED HEALTH CARE EDUCATION/TRAINING PROGRAM

## 2023-09-05 PROCEDURE — 27000248 HC VAD-ADDITIONAL DAY

## 2023-09-05 PROCEDURE — 21400001 HC TELEMETRY ROOM

## 2023-09-05 PROCEDURE — 85730 THROMBOPLASTIN TIME PARTIAL: CPT | Performed by: INTERNAL MEDICINE

## 2023-09-05 RX ORDER — MAGNESIUM SULFATE HEPTAHYDRATE 40 MG/ML
2 INJECTION, SOLUTION INTRAVENOUS ONCE
Status: COMPLETED | OUTPATIENT
Start: 2023-09-05 | End: 2023-09-05

## 2023-09-05 RX ADMIN — INSULIN DETEMIR 6 UNITS: 100 INJECTION, SOLUTION SUBCUTANEOUS at 08:09

## 2023-09-05 RX ADMIN — ATORVASTATIN CALCIUM 40 MG: 20 TABLET, FILM COATED ORAL at 08:09

## 2023-09-05 RX ADMIN — LEVETIRACETAM 1000 MG: 500 TABLET, FILM COATED ORAL at 08:09

## 2023-09-05 RX ADMIN — ASPIRIN 81 MG 81 MG: 81 TABLET ORAL at 08:09

## 2023-09-05 RX ADMIN — INSULIN ASPART 4 UNITS: 100 INJECTION, SOLUTION INTRAVENOUS; SUBCUTANEOUS at 05:09

## 2023-09-05 RX ADMIN — CEFAZOLIN 8 G: 10 INJECTION, POWDER, FOR SOLUTION INTRAVENOUS at 02:09

## 2023-09-05 RX ADMIN — BUSPIRONE HYDROCHLORIDE 10 MG: 10 TABLET ORAL at 08:09

## 2023-09-05 RX ADMIN — INSULIN DETEMIR 6 UNITS: 100 INJECTION, SOLUTION SUBCUTANEOUS at 09:09

## 2023-09-05 RX ADMIN — BUSPIRONE HYDROCHLORIDE 10 MG: 10 TABLET ORAL at 09:09

## 2023-09-05 RX ADMIN — WARFARIN SODIUM 4 MG: 4 TABLET ORAL at 05:09

## 2023-09-05 RX ADMIN — INSULIN ASPART 2 UNITS: 100 INJECTION, SOLUTION INTRAVENOUS; SUBCUTANEOUS at 11:09

## 2023-09-05 RX ADMIN — LEVETIRACETAM 1000 MG: 500 TABLET, FILM COATED ORAL at 09:09

## 2023-09-05 RX ADMIN — MIRTAZAPINE 15 MG: 15 TABLET, FILM COATED ORAL at 09:09

## 2023-09-05 RX ADMIN — MAGNESIUM SULFATE HEPTAHYDRATE 2 G: 40 INJECTION, SOLUTION INTRAVENOUS at 06:09

## 2023-09-05 RX ADMIN — VALSARTAN 40 MG: 40 TABLET, FILM COATED ORAL at 08:09

## 2023-09-05 RX ADMIN — VALSARTAN 40 MG: 40 TABLET, FILM COATED ORAL at 09:09

## 2023-09-05 RX ADMIN — PANTOPRAZOLE SODIUM 40 MG: 40 TABLET, DELAYED RELEASE ORAL at 08:09

## 2023-09-05 RX ADMIN — HEPARIN SODIUM 14 UNITS/KG/HR: 10000 INJECTION, SOLUTION INTRAVENOUS at 09:09

## 2023-09-05 NOTE — NURSING
SICU PLAN OF CARE NOTE    Dx: Embolic stroke involving left middle cerebral artery    Goals of Care: MAP 65-95, stepdown     Vital Signs (last 12 hours):   Temp:  [98.4 °F (36.9 °C)-98.8 °F (37.1 °C)]   Pulse:  []   Resp:  [10-20]   BP: (74-92)/(0)   SpO2:  [98 %-100 %]      Neuro: AAO x4    Cardiac: NSR    Respiratory: Room Air    Gtts: Milrinone and Heparin, ancef    Urine Output: Voids Spontaneously 825 cc/shift      Diet: Cardiac Diet      VAD speed 5100,  no alarms       Labs/Accuchecks: ACHS accuchecks.    Skin: OOBTC, sacral and heel foams intact    Shift Events: PT

## 2023-09-05 NOTE — CARE UPDATE
Care Update:     No acute events overnight. Patient on the SICU in room 24114/41227 A. Blood glucose stable. BG at goal on current insulin regimen (SSI + basal). Steroid use- None.    Renal function- Normal   Vasopressors-  None       Diet Cardiac Fluid - 1500mL; Thin     POCT Glucose   Date Value Ref Range Status   09/05/2023 137 (H) 70 - 110 mg/dL Final   09/05/2023 157 (H) 70 - 110 mg/dL Final   09/04/2023 134 (H) 70 - 110 mg/dL Final   09/04/2023 254 (H) 70 - 110 mg/dL Final   09/04/2023 125 (H) 70 - 110 mg/dL Final   09/04/2023 149 (H) 70 - 110 mg/dL Final   09/03/2023 135 (H) 70 - 110 mg/dL Final   09/03/2023 148 (H) 70 - 110 mg/dL Final   09/03/2023 128 (H) 70 - 110 mg/dL Final   09/03/2023 141 (H) 70 - 110 mg/dL Final   09/02/2023 126 (H) 70 - 110 mg/dL Final   09/02/2023 123 (H) 70 - 110 mg/dL Final   09/02/2023 137 (H) 70 - 110 mg/dL Final     Lab Results   Component Value Date    HGBA1C 6.9 (H) 08/31/2023       Diabetes Medications               insulin aspart U-100 (NOVOLOG) 100 unit/mL (3 mL) InPn pen Inject 14 Units into the skin 3 (three) times daily with meals. Plus Sliding Scale: 151-200: +1, 201-250: +2, 251-300: +3, 301-350: +4 and call MD    insulin detemir U-100, Levemir, 100 unit/mL (3 mL) SubQ InPn pen Inject 24 Units into the skin 2 (two) times daily.          Endocrine  Type 2 diabetes mellitus with hyperglycemia  Endocrinology consulted for BG management.   BG goal 140-180     - Levemir (Insulin Detemir) to 6 units BID  - Novolog (Insulin Aspart) prn for BG excursions MDC SSI (150/25)  - BG checks ac/hs   - Hypoglycemia protocol in place    ** Please notify Endocrine for any change and/or advance in diet**  ** Please call Endocrine for any BG related issues **    Discharge Planning:   TBD. Please notify endocrinology prior to discharge.      Michael Wyman DNP, FNP-C  Department of Endocrinology  Inpatient Glycemic Management

## 2023-09-05 NOTE — PROGRESS NOTES
[facility] - Surgical Intensive Care  Adult Nutrition  Progress Note    SUMMARY       Recommendations    1.) Recommend to continue with Cardiac diet, as tolerated- adding double portions as per pt's request- fluid per MD.     2.) Recommend honoring new dietary preferences.     3.) RD to monitor wt, PO intake, skin, labs.      Goals: to meet % of EEN/EPN by next RD f/u  Nutrition Goal Status: progressing towards goal  Communication of RD Recs: other (comment) (POC)    Assessment and Plan    Endocrine  Moderate malnutrition  Malnutrition Type:  Context: chronic illness  Level: moderate    Related to (etiology):   CHF     Signs and Symptoms (as evidenced by):   Moderate fat/muscle wasting; -12% x6mo    Malnutrition Characteristic Summary:  Weight Loss (Malnutrition): greater than 10% in 6 months  Subcutaneous Fat (Malnutrition): moderate depletion  Muscle Mass (Malnutrition): moderate depletion  Fluid Accumulation (Malnutrition):  (Trace)      Interventions/Recommendations (treatment strategy):  1.) Recommend to continue with Cardiac die, as tolerated- adding double portions as per pt's request- fluid per MD. 2.) Recommend honoring new dietary preferences. 3.) RD to monitor wt, PO intake, skin, labs.    Nutrition Diagnosis Status:   New         Malnutrition Assessment  Malnutrition Context: chronic illness  Malnutrition Level: moderate  Skin (Micronutrient): dry       Weight Loss (Malnutrition): greater than 10% in 6 months  Subcutaneous Fat (Malnutrition): moderate depletion  Muscle Mass (Malnutrition): moderate depletion  Fluid Accumulation (Malnutrition):  (Trace)   Orbital Region (Subcutaneous Fat Loss): moderate depletion  Upper Arm Region (Subcutaneous Fat Loss): moderate depletion  Thoracic and Lumbar Region: moderate depletion   Mormonism Region (Muscle Loss): moderate depletion  Clavicle Bone Region (Muscle Loss): moderate depletion  Clavicle and Acromion Bone Region (Muscle Loss): moderate  "depletion  Dorsal Hand (Muscle Loss): mild depletion     Reason for Assessment    Reason For Assessment: RD follow-up  Diagnosis: stroke/CVA (Required emergent intubation)  Relevant Medical History: CHF, DM, drug abuse, medication noncompliance, cardiomyopathy s/p LVAD (6/2022), and seizures  Interdisciplinary Rounds: attended  General Information Comments: RD f/u: Pt is extubated, off vent/sedation. Diet was advanced 9/2- was previously on TF. Pt denies n/v/d/c. Pt endorses good appetite, stating "I am hungry", consuming % of their meals. RD obtained dietary preferences to relay to dietary staff. Pt stated that they are "off and on" with Vegan lifestyle, and does not mind eating meat, here in the hospital. Pt stated that they had lost wt, but was not sure- stated that wt loss was d/t feeling ill. As per chart review, pt has lost -6% x1mo, -9.6%x3mo, -12%x6mo- all sig wt loss. NFPE performed on 9/5- moderate wasting noted- meets criteria for moderate malnutrition in the context of chronic disease. Trace edema noted. Pt declined cardiac diet edu at this time.  Nutrition Discharge Planning: adequate PO intake    Nutrition Risk Screen    Nutrition Risk Screen: no indicators present    Nutrition/Diet History    Patient Reported Diet/Restrictions/Preferences:  (Vegan)  Spiritual, Cultural Beliefs, Mosque Practices, Values that Affect Care: no    Anthropometrics    Temp: 98.5 °F (36.9 °C)  Height Method: Estimated  Height: 6' 3" (190.5 cm)  Height (inches): 75 in  Weight Method: Bed Scale  Weight: 89.8 kg (198 lb)  Weight (lb): 198 lb  Ideal Body Weight (IBW), Male: 196 lb  % Ideal Body Weight, Male (lb): 101.02 %  BMI (Calculated): 24.7  BMI Grade: 25 - 29.9 - overweight    Lab/Procedures/Meds    Pertinent Labs Reviewed: reviewed  Pertinent Labs Comments: Gluc: 198, alb: 2.8, Ca: 8.2  Pertinent Medications Reviewed: reviewed  Pertinent Medications Comments: atorvastatin, coumadin, polyethylene glycol, senna, " pantoprazole, insulin    Estimated/Assessed Needs    Weight Used For Calorie Calculations: 95.5 kg (210 lb 8.6 oz)  Energy Calorie Requirements (kcal): 2156 kcal  Energy Need Method: Morgan-St Jeor (MSJ x1.1)  Protein Requirements: 96- 115g (1.0-1.2g/kg)  Weight Used For Protein Calculations: 95.5 kg (210 lb 8.6 oz)  Fluid Requirements (mL): 1ml/1kcal or per MD  Estimated Fluid Requirement Method: RDA Method  RDA Method (mL): 2156  CHO Requirement: 270g    Nutrition Prescription Ordered    Current Diet Order: Cardiac diet (1.5L FR)    Evaluation of Received Nutrient/Fluid Intake    I/O: -4.7L since admit  Energy Calories Required: meeting needs  Protein Required: meeting needs  Fluid Required:  (as per MD)  Comments: LBM 9/3  % Intake of Estimated Energy Needs: 75 - 100 %  % Meal Intake: 75 - 100 %    Nutrition Risk    Level of Risk/Frequency of Follow-up:  (RD to f/u x1-2/week)     Monitor and Evaluation    Food and Nutrient Intake: energy intake  Food and Nutrient Adminstration: diet order  Physical Activity and Function: nutrition-related ADLs and IADLs  Anthropometric Measurements: weight, weight change, body mass index  Biochemical Data, Medical Tests and Procedures: electrolyte and renal panel, gastrointestinal profile, glucose/endocrine profile, inflammatory profile, lipid profile  Nutrition-Focused Physical Findings: overall appearance, skin     Nutrition Follow-Up    RD Follow-up?: Yes

## 2023-09-05 NOTE — ASSESSMENT & PLAN NOTE
-NICM s/p LVAD and on Milrinone   - Last Echo (7/31/2023): HM3 at 5100. AV does not open. IV septum midline. EF 10-15%. LV severely dilated. Normal wall thickness. Severe global hypokinesis. RV not well visualized due to poor acoustic window. Biatrial enlargment. Mod TR.    -diuresed on IV Lasix with good response since admission.  IV lasix has been on hold.  CVP: 5 this am. Euvolemic on exam.  Will continue to hold Lasix.   -Waiting for repeat echo   - Home GDMT: valsartan 40mg bid  - Home diuretic regimen: Lasix 80mg daily.   - Ionotropes: continue home milrinone 0.25.   - Strict I&Os and daily weights.   - Maintain K>4 and Mg>2

## 2023-09-05 NOTE — PROGRESS NOTES
Interdisciplinary Rounds Report:   Attended interdisciplinary rounds with the [facility]/CTS services including the LVAD Coordinators, social workers, cardiologists, surgeons,  PT/OT/Speech, dietician, and unit charge nurses. Discussed patient status, plan of care, goals of care, including DC date, and post discharge needs. Plan of care will be discussed with the patient and/or family per the physician while rounding on the floor. This is a recurring meeting that is medically and socially necessary to collaborate with the interdisciplinary team to assist patient needs and safe discharge.

## 2023-09-05 NOTE — ASSESSMENT & PLAN NOTE
Malnutrition Type:  Context: chronic illness  Level: moderate    Related to (etiology):   CHF     Signs and Symptoms (as evidenced by):   Moderate fat/muscle wasting; -12% x6mo    Malnutrition Characteristic Summary:  Weight Loss (Malnutrition): greater than 10% in 6 months  Subcutaneous Fat (Malnutrition): moderate depletion  Muscle Mass (Malnutrition): moderate depletion  Fluid Accumulation (Malnutrition):  (Trace)      Interventions/Recommendations (treatment strategy):  1.) Recommend to continue with Cardiac die, as tolerated- adding double portions as per pt's request- fluid per MD. 2.) Recommend honoring new dietary preferences. 3.) RD to monitor wt, PO intake, skin, labs.    Nutrition Diagnosis Status:   New

## 2023-09-05 NOTE — PLAN OF CARE
[facility]   Heart Transplant/VAD Clinic   [address] [place] [address]   [phone] after hours          [phone] fax     VAD HOME  HEALTH ORDERS      Admit to Home Health    Diagnosis:   Patient Active Problem List   Diagnosis    Acute on chronic combined systolic and diastolic heart failure, NYHA class 4    Anxiety disorder, unspecified    Anemia of chronic disease    Ecstasy abuse    Cannabis use disorder, severe, dependence    Acute combined systolic and diastolic congestive heart failure    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    Type 2 diabetes mellitus with hyperglycemia    Insomnia    Tachycardia    Familial dilated cardiomyopathy    LVAD (left ventricular assist device) present    On mechanically assisted ventilation    Seizure-like activity    Temporal lobe seizure    Examination of participant in clinical trial    Right heart failure secondary to left heart failure-post LVAD surgery    History of substance abuse    Sepsis    Bacteremia due to Staphylococcus    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 S/P LVAD placement.     Diet: Low Fat, Low cholesterol, 2Gm Na, Coumadin restrictions.    Acitivities: No Swimming, bathing, vacuuming, contact sports.    Fresh implants= Sternal Precautions    Nursing:   SN to complete comprehensive assessment including routine vital signs. Instruct on disease process and s/s of complications to report to MD. Review/verify medication list sent home with the patient at time of discharge  and instruct patient/caregiver as needed. Frequency may be adjusted depending on start of care date.    **LVAD driveline exit site  dressing change is to be completed per LVAD patient/caregiver only**.    Notify MD if:  SBP > 120 or < 80;   MAP > 80 or < 65;   HR > 120 or < 60;   Temp > 101;   Weight gain >3lbs in 1 day or 5lbs in 1 week.    LABS:  SN to perform labs: PT/INR per Coumadin clinic [phone].   Follow up INR date: 9/11/23  No Finger Sticks    Cbc, cmp, bnp, mag, inr weekly starting 9/12/23      HOME INFUSION THERAPY: SN to perform Infusion Therapy/Central Line Care.  Review Central Line Care & Central Line Flush with patient.    Administer (drug and dose):    Continuous IV Milrinone 0.25mcg/kg/min, dosing weight 95.3kg     Cefazolin 8g, intravenous, administer over 24 hours, every 24 hours x 6weeks.  End date: 9/14/23    Central line care:  Scrub the Hub: Prior to accessing the line, always perform a 30 second alcohol scrub  Each lumen of the central line is to be flushed at least daily with 10 mL Normal Saline and 3 mL Heparin flush (100 units/mL)  Skilled Nurse (SN) may draw blood from IV access  Blood Draw Procedure:   - Aspirate at least 5 mL of blood   - Discard   - Obtain specimen   - Change posiflow cap   - Flush with 20 mL Normal Saline followed by a                 3-5 mL Heparin flush (100 units/mL)  Central :   - Sterile dressing changes are done weekly and as needed.   - Use chlor-hexadine scrub to cleanse site, apply Biopatch to insertion site,       apply securement device dressing   - Posi-flow caps are changed weekly and after EVERY lab draw.   - If sterile gauze is under dressing to control oozing,                 dressing change must be performed every 24 hours until gauze is not needed.      Send initial Home Health orders to [facility] attending physician on call.  Send follow up questions to VAD clinic MD [phone] or [phone].

## 2023-09-05 NOTE — PROGRESS NOTES
[facility]  Cardiothoracic Surgery  Evaluation and Management/VAD interrogation       Patient Name: Kevan Queen  MRN: [id number]  Admission Date: 8/31/2023  Hospital Length of Stay: 5 days  Code Status: Full Code   Attending Physician: Luca Lopez Jr.*   Referring Provider: Rodger Yadav IV, MD  Principal Problem:Embolic stroke involving left middle cerebral artery  Subjective:     Post-Op Info:  * No surgery found *         Subjective:     Post-Op Info:  * No surgery found *          Reason for Visit:  Patient is seen in follow up management of LVAD/MCA Stroke    Interval History: Awake and alert in bed laughing and joking with staff.  Restarted coumadin    Medications:  Continuous Infusions:   heparin (porcine) in D5W Stopped (09/05/23 1259)    milrinone 20mg/100ml D5W (200mcg/ml) Stopped (09/05/23 1259)     Scheduled Meds:   aspirin  81 mg Oral Daily    atorvastatin  40 mg Oral Daily    busPIRone  10 mg Oral BID    ceFAZolin (ANCEF) 8 g in dextrose 5 % (D5W) 500 mL CONTINUOUS INFUSION  8 g Intravenous Q24H    insulin detemir U-100  6 Units Subcutaneous BID    levETIRAcetam  1,000 mg Oral BID    mirtazapine  15 mg Oral Nightly    mupirocin   Nasal BID    pantoprazole  40 mg Oral Daily    polyethylene glycol  17 g Oral Daily    senna  8.6 mg Oral Daily    valsartan  40 mg Oral BID    warfarin  4 mg Oral Daily     PRN Meds:sodium chloride 0.9%, dextrose 10%, dextrose 10%, glucagon (human recombinant), glucose, glucose, insulin aspart U-100     Objective:     Vital Signs (Most Recent):  Temp: 98.5 °F (36.9 °C) (09/05/23 1100)  Pulse: 93 (09/05/23 1100)  Resp: 20 (09/05/23 1100)  BP: (!) 92/0 (09/05/23 1100)  SpO2: 100 % (09/05/23 1100) Vital Signs (24h Range):  Temp:  [98 °F (36.7 °C)-98.8 °F (37.1 °C)] 98.5 °F (36.9 °C)  Pulse:  [] 93  Resp:  [7-75] 20  SpO2:  [96 %-100 %] 100 %  BP: (65-92)/(0) 92/0       Intake/Output Summary (Last 24 hours) at 9/5/2023  1437  Last data filed at 9/5/2023 1400  Gross per 24 hour   Intake 1315.25 ml   Output 1220 ml   Net 95.25 ml       Physical Exam  HENT:      Head: Normocephalic.   Eyes:      Extraocular Movements: Extraocular movements intact.   Cardiovascular:      Rate and Rhythm: Normal rate and regular rhythm.      Comments: LVAD hum is smooth  Pulmonary:      Effort: Pulmonary effort is normal.      Breath sounds: Normal breath sounds.   Abdominal:      General: Abdomen is flat.      Palpations: Abdomen is soft.   Skin:     General: Skin is warm and dry.   Neurological:      General: No focal deficit present.         Significant Labs:  BMP:   Recent Labs   Lab 09/05/23 0319   *   *   K 4.0      CO2 26   BUN 4*   CREATININE 1.1   CALCIUM 8.4*   MG 1.7     CBC:   Recent Labs   Lab 09/05/23 0319   WBC 7.90   RBC 3.86*   HGB 10.5*   HCT 34.0*      MCV 88   MCH 27.2   MCHC 30.9*     CMP:   Recent Labs   Lab 09/05/23 0319   *   CALCIUM 8.4*   ALBUMIN 2.4*   PROT 6.7   *   K 4.0   CO2 26      BUN 4*   CREATININE 1.1   ALKPHOS 94   ALT <5*   AST 17   BILITOT 0.6     Coagulation:   Recent Labs   Lab 09/05/23 0319 09/05/23  0436 09/05/23  1306   INR 1.4*  --   --    APTT  --    < > 47.8*    < > = values in this interval not displayed.       Significant Diagnostics:  I have reviewed and interpreted all pertinent imaging results/findings within the past 24 hours.    Procedure: Device Interrogation Including analysis of device parameters  Current Settings: Ventricular Assist Device  Review of device function is stable      9/5/2023     2:00 PM 9/5/2023     1:00 PM 9/5/2023    12:00 PM 9/5/2023    11:00 AM 9/5/2023    10:00 AM 9/5/2023     9:00 AM 9/5/2023     8:00 AM   TXP LVAD INTERROGATIONS   Type HeartMate3 HeartMate3 HeartMate3 HeartMate3 HeartMate3 HeartMate3 HeartMate3   Flow 4.1 4.1 3.9 4 4 3.5 3.5   Speed 5100 5100 5100 5100 5100 5100 5100   PI 4.4 4.7 5.8 5.4 5.6 8.2 6.3   Power  (Serna) 3.6 3.7 3.6 3.6 3.7 3.7 3.7   LSL 4700 4700 4700 4700 4700 4700 4700   Pulsatility Intermittent pulse Intermittent pulse Intermittent pulse Intermittent pulse Intermittent pulse Intermittent pulse Intermittent pulse         Assessment/Plan:     LVAD (left ventricular assist device) present  - Admitted for Left MCA stroke  - Implant Date: 6/29/2022 with Dr. Paige  - Speed: 5100  - Low Flow Events: None overnight  - Interval History was obtained from team member during rounding today.  - VAD/ANABELLE teaching was performed with patient.  - Mobilization/Physical Therapy is ongoing.  - Anticoagulation: Coumadin/Heparin  - INR: 1.4  - Urine Output: 1900  - BUN: 4   - Creat: 1.1  - LDH: 226  - Awake and alert in bed during assessment    More than 50 percent of the care dominated counseling and coordinating care with different team members. The VAD was interrogated and no significant findings were found in the history. All these findings are documented in the note above.        Vandana Coleman NP  Cardiothoracic Surgery  [facility]

## 2023-09-05 NOTE — PT/OT/SLP PROGRESS
Physical Therapy Treatment    Patient Name:  Kevan Queen   MRN:  [id number]  Admitting Diagnosis:  Embolic stroke involving left middle cerebral artery   Recent Surgery: * No surgery found *    Admit Date: 8/31/2023  Length of Stay: 5 days    Recommendations:     Discharge Recommendations:  other (see comments)   Discharge Equipment Recommendations: none   Barriers to discharge: None    Appropriate transfer level with nursing staff: ambulation 3-4x/day with SBA.     Plan:     During this hospitalization, patient to be seen 2 x/week to address the identified rehab impairments via gait training, therapeutic activities, therapeutic exercises, neuromuscular re-education and progress towards the established goals.  Plan of Care Expires:  09/30/23  Plan of Care Reviewed with: patient    Assessment:     Kevan Queen is a 38 y.o. male admitted with a medical diagnosis of Embolic stroke involving left middle cerebral artery. Pt tolerated session well today. He is showing good improvements in dynamic balance and strength as he was able to demo decreased assist needed for standing/ambulation with no LOB noted. Pt requiring min vc's for safety and management of LVAD lines at this time. Pt will continue to benefit from skilled PT services during this hospital admit to continue to improve transfer ability and efficiency as well as continue to progress pt's ambulation distance and cardiopulmonary endurance to maximize pt's functional independence and return to PLOF.    Problem List: impaired endurance, gait instability, impaired functional mobility, impaired balance, decreased safety awareness, impaired cardiopulmonary response to activity.  Rehab Prognosis: Good; patient would benefit from acute skilled PT services to address these deficits and reach maximum level of function.      Goals:   Multidisciplinary Problems       Physical Therapy Goals          Problem: Physical Therapy    Goal Priority Disciplines Outcome Goal  "Variances Interventions   Physical Therapy Goal     PT, PT/OT Ongoing, Progressing     Description: Goals to be met by: 23     Patient will increase functional independence with mobility by performin. Supine to sit with Modified [place] -met   2. Sit to stand transfer with Supervision - met   3. Gait  x 250 feet with Stand-by Assistance using AD if needed. - not met  4. Pt transfer bed to bedside chair with SBA. . - not met                         Subjective     RN notified prior to session. No family/friends present upon PT entrance into room. Patient agreeable to PT treatment session.    Chief Complaint: "the only thing that still feels off is my speech"  Patient/Family Comments/goals: go home  Pain/Comfort:  Pain Rating 1: 0/10  Pain Rating Post-Intervention 1: 0/10      Objective:     Additional staff present: N/A    Patient found HOB elevated with: LVAD, telemetry, blood pressure cuff, pulse ox (continuous), PICC line   Cognition:   Alert and Cooperative  Patient is oriented to Person, Place, Time, Situation  Command following: Follows multistep verbal commands  Fluency: clear/fluent  General Precautions: Standard, Cardiac fall, LVAD   Orthopedic Precautions:N/A   Braces: N/A   Body mass index is 24.75 kg/m².  Oxygen Device: Room Air  Vitals: BP (!) 74/0 (BP Location: Left arm, Patient Position: Lying)   Pulse 103   Temp 98.4 °F (36.9 °C) (Oral)   Resp 19   Ht 6' 3" (1.905 m)   Wt 89.8 kg (198 lb)   SpO2 98%   BMI 24.75 kg/m²     Outcome Measures:  AM-PAC 6 CLICK MOBILITY  Turning over in bed (including adjusting bedclothes, sheets and blankets)?: 4  Sitting down on and standing up from a chair with arms (e.g., wheelchair, bedside commode, etc.): 4  Moving from lying on back to sitting on the side of the bed?: 4  Moving to and from a bed to a chair (including a wheelchair)?: 3  Need to walk in hospital room?: 3  Climbing 3-5 steps with a railing?: 3  Basic Mobility Total Score: " 21     Functional Mobility:    Bed Mobility:   Supine to Sit: independence; from Lt side of bed  Scooting anteriorly to EOB to have both feet planted on floor: independence  Sit to Supine: independence; to Lt side of bed    Sitting Balance at Edge of Bed:  Static Sitting Balance: Normal : able to maintain balance against maximal resistance  Dynamic Sitting Balance: Normal : able to sit unsupported and weight shift across midline maximally  Assistance Level Required: [place]    Transfers:   Sit <> Stand Transfer: supervision with no assistive device   Stand <> Sit Transfer: supervision with no assistive device   [id number] from EOB and [id number] from toliet    Standing Balance:  Static Standing Balance: Good : able to maintain standing balance against moderate resistance  Dynamic Standing Balance: Good : able to stand independently unsupported and cross midline moderately  Assistance Level Required: Supervision  Patient used: no assistive device       Gait:  Patient ambulated: 14 ft; 30 ft (further mobility out of room deferred 2/2 no portable monitors present for use)   Patient required: standy by assistance progressing to supervision  Patient used:  no assistive device   Gait Pattern observed: reciprocal gait  Gait Deviation(s): decreased foot clearance, decreased william, and reciprocal arm swing  Impairments due to: decreased endurance and impaired safety awareness  all lines remained intact throughout ambulation trial  Comments: Patient required cues for  scanning enviornment and line mgmt  to increase independence and safety. Patient required cues ~ 50% of the time. Pt with no LOB throughout. Pt was able to perform vertical/horizontal head turns, 180 degree turns while ambulating, and avoid in-room obstacles without LOB.    Education:  Time provided for education, counseling and discussion of health disposition in regards to patient's current status  All questions answered within PT scope of practice and to  patient's satisfaction  PT role in POC to address current functional deficits  Pt educated on proper body mechanics, safety techniques, and energy conservation with PT facilitation and cueing throughout session  Call nursing/pct to transfer to chair/use bathroom. Pt stated understanding.  Whiteboard updated with therapist name and pt's current mobility status documented above  Safe to perform step transfer to/from chair/bedside commode supervision and no AD w/ nursing/PCT present  Pt to ambulate 3-4x/day SBA to Supervision and no AD with nsg/PCT in order to maintain functional mobility  Importance of OOB tolerance 8-10 hrs/day to improve lung ventilation and expansion as well as strengthen postural musculature  LVAD: patient found on wall power / returned on wall power. no alarms sounded.     Patient left HOB elevated with all lines intact, call button in reach, and RN notified.    Time Tracking:     PT Received On: 09/05/23  PT Start Time: 1511     PT Stop Time: 1526  PT Total Time (min): 15 min     Billable Minutes:   Therapeutic Activity 15 minutes    Treatment Type: Treatment  PT/PTA: PT       9/5/2023

## 2023-09-05 NOTE — SUBJECTIVE & OBJECTIVE
Subjective:     Post-Op Info:  * No surgery found *          Reason for Visit:  Patient is seen in follow up management of LVAD/MCA Stroke    Interval History: Awake and alert in bed laughing and joking with staff.  Restarted coumadin    Medications:  Continuous Infusions:   heparin (porcine) in D5W Stopped (09/05/23 1259)    milrinone 20mg/100ml D5W (200mcg/ml) Stopped (09/05/23 1259)     Scheduled Meds:   aspirin  81 mg Oral Daily    atorvastatin  40 mg Oral Daily    busPIRone  10 mg Oral BID    ceFAZolin (ANCEF) 8 g in dextrose 5 % (D5W) 500 mL CONTINUOUS INFUSION  8 g Intravenous Q24H    insulin detemir U-100  6 Units Subcutaneous BID    levETIRAcetam  1,000 mg Oral BID    mirtazapine  15 mg Oral Nightly    mupirocin   Nasal BID    pantoprazole  40 mg Oral Daily    polyethylene glycol  17 g Oral Daily    senna  8.6 mg Oral Daily    valsartan  40 mg Oral BID    warfarin  4 mg Oral Daily     PRN Meds:sodium chloride 0.9%, dextrose 10%, dextrose 10%, glucagon (human recombinant), glucose, glucose, insulin aspart U-100     Objective:     Vital Signs (Most Recent):  Temp: 98.5 °F (36.9 °C) (09/05/23 1100)  Pulse: 93 (09/05/23 1100)  Resp: 20 (09/05/23 1100)  BP: (!) 92/0 (09/05/23 1100)  SpO2: 100 % (09/05/23 1100) Vital Signs (24h Range):  Temp:  [98 °F (36.7 °C)-98.8 °F (37.1 °C)] 98.5 °F (36.9 °C)  Pulse:  [] 93  Resp:  [7-75] 20  SpO2:  [96 %-100 %] 100 %  BP: (65-92)/(0) 92/0       Intake/Output Summary (Last 24 hours) at 9/5/2023 1437  Last data filed at 9/5/2023 1400  Gross per 24 hour   Intake 1315.25 ml   Output 1220 ml   Net 95.25 ml       Physical Exam  HENT:      Head: Normocephalic.   Eyes:      Extraocular Movements: Extraocular movements intact.   Cardiovascular:      Rate and Rhythm: Normal rate and regular rhythm.      Comments: LVAD hum is smooth  Pulmonary:      Effort: Pulmonary effort is normal.      Breath sounds: Normal breath sounds.   Abdominal:      General: Abdomen is flat.       Palpations: Abdomen is soft.   Skin:     General: Skin is warm and dry.   Neurological:      General: No focal deficit present.         Significant Labs:  BMP:   Recent Labs   Lab 09/05/23 0319   *   *   K 4.0      CO2 26   BUN 4*   CREATININE 1.1   CALCIUM 8.4*   MG 1.7     CBC:   Recent Labs   Lab 09/05/23 0319   WBC 7.90   RBC 3.86*   HGB 10.5*   HCT 34.0*      MCV 88   MCH 27.2   MCHC 30.9*     CMP:   Recent Labs   Lab 09/05/23 0319   *   CALCIUM 8.4*   ALBUMIN 2.4*   PROT 6.7   *   K 4.0   CO2 26      BUN 4*   CREATININE 1.1   ALKPHOS 94   ALT <5*   AST 17   BILITOT 0.6     Coagulation:   Recent Labs   Lab 09/05/23 0319 09/05/23  0436 09/05/23  1306   INR 1.4*  --   --    APTT  --    < > 47.8*    < > = values in this interval not displayed.       Significant Diagnostics:  I have reviewed and interpreted all pertinent imaging results/findings within the past 24 hours.    Procedure: Device Interrogation Including analysis of device parameters  Current Settings: Ventricular Assist Device  Review of device function is stable      9/5/2023     2:00 PM 9/5/2023     1:00 PM 9/5/2023    12:00 PM 9/5/2023    11:00 AM 9/5/2023    10:00 AM 9/5/2023     9:00 AM 9/5/2023     8:00 AM   TXP LVAD INTERROGATIONS   Type HeartMate3 HeartMate3 HeartMate3 HeartMate3 HeartMate3 HeartMate3 HeartMate3   Flow 4.1 4.1 3.9 4 4 3.5 3.5   Speed 5100 5100 5100 5100 5100 5100 5100   PI 4.4 4.7 5.8 5.4 5.6 8.2 6.3   Power (Serna) 3.6 3.7 3.6 3.6 3.7 3.7 3.7   LSL 4700 4700 4700 4700 4700 4700 4700   Pulsatility Intermittent pulse Intermittent pulse Intermittent pulse Intermittent pulse Intermittent pulse Intermittent pulse Intermittent pulse

## 2023-09-05 NOTE — ASSESSMENT & PLAN NOTE
- CTH with L parietal hypodensity with subtle areas of hyperdensity concerning for acute/subacute infarct with petechial hemorrhage, CTA negative for LVO or critical stenosis. Determined not a candidate for acute interventions with thrombolytics/thrombectomy.  - continue aspirin and statin. Repeat CT unremarkable   -On heparin and Coumadin   - PT, OT, Speech consulted.

## 2023-09-05 NOTE — ASSESSMENT & PLAN NOTE
- Admitted for Left MCA stroke  - Implant Date: 6/29/2022 with Dr. Paige  - Speed: 5100  - Low Flow Events: None overnight  - Interval History was obtained from team member during rounding today.  - VAD/ANABELLE teaching was performed with patient.  - Mobilization/Physical Therapy is ongoing.  - Anticoagulation: Coumadin/Heparin  - INR: 1.4  - Urine Output: 1900  - BUN: 4   - Creat: 1.1  - LDH: 226  - Awake and alert in bed during assessment    More than 50 percent of the care dominated counseling and coordinating care with different team members. The VAD was interrogated and no significant findings were found in the history. All these findings are documented in the note above.

## 2023-09-05 NOTE — PLAN OF CARE
Problem: Swallowing Impairment (Stroke, Ischemic/Transient Ischemic Attack)  Goal: Optimal Eating and Swallowing without Aspiration  Outcome: Met     Problem: Urinary Elimination Impaired (Stroke, Ischemic/Transient Ischemic Attack)  Goal: Effective Urinary Elimination  Outcome: Ongoing, Progressing     Problem: Respiratory Compromise (Stroke, Ischemic/Transient Ischemic Attack)  Goal: Effective Oxygenation and Ventilation  Outcome: Ongoing, Progressing     Problem: Nutrition Impaired (Sepsis/Septic Shock)  Goal: Optimal Nutrition Intake  Outcome: Ongoing, Progressing     Problem: Diabetes Comorbidity  Goal: Blood Glucose Level Within Targeted Range  Outcome: Ongoing, Progressing

## 2023-09-05 NOTE — SUBJECTIVE & OBJECTIVE
Interval History: No acute events overnight. No seizures noted. He is net +400cc today. CVP is 5. 2D echo is pending. Pt has transfer orders and is waiting for bed availability.     Continuous Infusions:   heparin (porcine) in D5W 14 Units/kg/hr (09/05/23 1000)    milrinone 20mg/100ml D5W (200mcg/ml) 0.25 mcg/kg/min (09/05/23 1000)     Scheduled Meds:   aspirin  81 mg Oral Daily    atorvastatin  40 mg Oral Daily    busPIRone  10 mg Oral BID    ceFAZolin (ANCEF) 8 g in dextrose 5 % (D5W) 500 mL CONTINUOUS INFUSION  8 g Intravenous Q24H    insulin detemir U-100  6 Units Subcutaneous BID    levETIRAcetam  1,000 mg Oral BID    mirtazapine  15 mg Oral Nightly    mupirocin   Nasal BID    pantoprazole  40 mg Oral Daily    polyethylene glycol  17 g Oral Daily    senna  8.6 mg Oral Daily    valsartan  40 mg Oral BID    warfarin  4 mg Oral Daily     PRN Meds:sodium chloride 0.9%, dextrose 10%, dextrose 10%, glucagon (human recombinant), glucose, glucose, insulin aspart U-100    Review of patient's allergies indicates:   Allergen Reactions    Bumex [bumetanide] Hives    Torsemide Hives     Objective:     Vital Signs (Most Recent):  Temp: 98.8 °F (37.1 °C) (09/05/23 0700)  Pulse: 92 (09/05/23 0728)  Resp: 10 (09/05/23 0700)  BP: (!) 80/0 (09/05/23 0700)  SpO2: 99 % (09/05/23 0700) Vital Signs (24h Range):  Temp:  [98 °F (36.7 °C)-98.8 °F (37.1 °C)] 98.8 °F (37.1 °C)  Pulse:  [] 92  Resp:  [7-75] 10  SpO2:  [96 %-99 %] 99 %  BP: (65-90)/(0) 80/0  Arterial Line BP: (78-87)/(65-70) 87/70     Patient Vitals for the past 72 hrs (Last 3 readings):   Weight   09/04/23 0500 90 kg (198 lb 6.6 oz)   09/03/23 0500 90 kg (198 lb 6.6 oz)     Body mass index is 24.8 kg/m².      Intake/Output Summary (Last 24 hours) at 9/5/2023 1047  Last data filed at 9/5/2023 1000  Gross per 24 hour   Intake 1170.67 ml   Output 1520 ml   Net -349.33 ml       Hemodynamic Parameters:       Telemetry: NSR        Physical Exam  Constitutional:        Appearance: Normal appearance.   HENT:      Head: Normocephalic and atraumatic.   Cardiovascular:      Rate and Rhythm: Normal rate and regular rhythm.      Pulses: Normal pulses.      Comments: Smooth VAD hum   Pulmonary:      Effort: Pulmonary effort is normal.      Breath sounds: Normal breath sounds.   Abdominal:      General: Bowel sounds are normal.      Palpations: Abdomen is soft.   Musculoskeletal:         General: Normal range of motion.      Cervical back: Normal range of motion and neck supple.      Right lower leg: No edema.      Left lower leg: No edema.   Skin:     General: Skin is warm and dry.   Neurological:      General: No focal deficit present.      Mental Status: He is alert and oriented to person, place, and time.            Significant Labs:  CBC:  Recent Labs   Lab 09/03/23 0410 09/04/23 0315 09/05/23 0319   WBC 10.29 8.64 7.90   RBC 3.86* 4.00* 3.86*   HGB 10.4* 10.8* 10.5*   HCT 33.7* 34.2* 34.0*    303 298   MCV 87 86 88   MCH 26.9* 27.0 27.2   MCHC 30.9* 31.6* 30.9*     BNP:  Recent Labs   Lab 08/31/23  1046   *     CMP:  Recent Labs   Lab 09/03/23  0410 09/04/23 0315 09/04/23  1534 09/05/23  0319   * 145*  --  264*   CALCIUM 8.5* 8.7  --  8.4*   ALBUMIN 2.5* 2.6*  --  2.4*   PROT 6.9 7.3  --  6.7    135*  --  135*   K 4.0 3.0* 3.2* 4.0   CO2 25 26  --  26   CL 98 100  --  100   BUN 10 7  --  4*   CREATININE 1.0 1.0  --  1.1   ALKPHOS 94 100  --  94   ALT <5* <5*  --  <5*   AST 17 16  --  17   BILITOT 1.0 0.8  --  0.6      Coagulation:   Recent Labs   Lab 09/03/23  0410 09/04/23 0315 09/05/23 0319 09/05/23  0436   INR 1.2 1.3* 1.4*  --    APTT 43.5*  43.5* 44.4*  44.4*  --  54.4*     LDH:  Recent Labs   Lab 09/03/23  0410 09/04/23  0315 09/05/23 0319   * 279* 226     Microbiology:  Microbiology Results (last 7 days)       ** No results found for the last 168 hours. **            BMP:   Recent Labs   Lab 09/05/23 0319   *   *   K  4.0      CO2 26   BUN 4*   CREATININE 1.1   CALCIUM 8.4*   MG 1.7     I have reviewed all pertinent labs within the past 24 hours.    Estimated Creatinine Clearance: 108.8 mL/min (based on SCr of 1.1 mg/dL).    Diagnostic Results:  I have reviewed all pertinent imaging results/findings within the past 24 hours.

## 2023-09-05 NOTE — NURSING
Rounded on patient at bedside. Patient is A&Ox 4 w/ no family/caregiver at bedside.  LVAD history interrogated with no abnormal events noted.  LVAD parameters WNL. Patient able to change power sources on his VAD appropriately with no prompting, able to verbalize how to tell the VAD is running, hazard alarms and what to do for them. Pt denies any needs at this time.  Patient reported having at least two boxes dressing supplies at home for discharge. Patient reported no equipment needs.  Encouraged pt to notify nurse if they have any questions, problems or concerns for LVAD coordinator.  Pt verbalizes understanding. Pt verbalized understanding and in agreement of plan. Will continue to round on patinet.

## 2023-09-05 NOTE — ASSESSMENT & PLAN NOTE
- loaded with keppra in ED prior to arrival.   - EEG unremarkable.  Will continue keppra 1000mg bid as per Neuro recs   - Neurology consulted. Appreciate recommendations.

## 2023-09-05 NOTE — CARE UPDATE
SICU PLAN OF CARE NOTE    Dx: Embolic stroke involving left middle cerebral artery    Goals of Care: MAP 65-90    Vital Signs (last 12 hours):   Temp:  [98 °F (36.7 °C)-98.8 °F (37.1 °C)]   Pulse:  []   Resp:  [7-75]   BP: (65-90)/(0)   SpO2:  [96 %-97 %]      Neuro: AAO x4, Follows Commands, and Moves All Extremities    Cardiac: nsr    Respiratory: Room Air    Gtts: Milrinone and Heparin    Urine Output: Voids Spontaneously 520 cc/shift     Labs/Accuchecks: M.7.    Skin: Driveline insertion CDI    Shift Events: No acute events overnight

## 2023-09-05 NOTE — PT/OT/SLP EVAL
Speech Language Pathology Evaluation  Cognitive Communication    Patient Name:  Kevan Queen   MRN:  [id number]  Admitting Diagnosis: Embolic stroke involving left middle cerebral artery    Recommendations:     Recommendations:                General Recommendations:  Speech/language therapy and Cognitive-linguistic therapy  Diet recommendations:  Regular, Thin   Aspiration Precautions: Standard aspiration precautions, medications whole   General Precautions: Standard, aspiration, fall  Communication strategies:  none    Assessment:     Kevan Queen is a 38 y.o. male with an SLP diagnosis of Aphasia and Cognitive-Linguistic Impairment.  He presents with new onset of word finding difficulties and decreased safety awareness skills.     History:     Past Medical History:   Diagnosis Date    Acute respiratory failure with hypoxia 7/22/2023    Arthritis     Awareness alteration, transient 9/1/2022    Cardiomyopathy     CHF (congestive heart failure) 10/01/2020    COVID-19 6/3/2023    Diabetes mellitus     Dilated cardiomyopathy 10/26/2020    Drug abuse 10/2020    Headache 4/19/2023    Hyperglycemia 12/16/2022    Hyperosmolar hyperglycemic state (HHS) 5/25/2022    ICD (implantable cardioverter-defibrillator) in place 10/26/2020    Left ventricular assist device (LVAD) complication, initial encounter 6/5/2023    Muscle cramping 6/15/2022    Renal disorder     SOB (shortness of breath) 6/13/2022    Tingling in extremities 7/13/2022       Past Surgical History:   Procedure Laterality Date    APPLICATION OF WOUND VACUUM-ASSISTED CLOSURE DEVICE N/A 6/30/2022    Procedure: APPLICATION, WOUND VAC;  Surgeon: Luis F Paige MD;  Location: [address];  Service: Cardiovascular;  Laterality: N/A;  50 x 5 cm    CARDIAC DEFIBRILLATOR PLACEMENT      IMPLANTATION OF RIGHT VENTRICULAR ASSIST DEVICE (RVAD) N/A 6/29/2022    Procedure: INSERTION, RVAD;  Surgeon: Luis F Paige MD;  Location: [facility] OR [address] [place] [address];  Service: Cardiovascular;   Laterality: N/A;    INSERTION OF GRAFT TO PERICARDIUM Right 6/30/2022    Procedure: INSERTION-RIGHT VENTRICULAR ASSIST DEVICE;  Surgeon: Luis F Paige MD;  [facility];  Service: Cardiovascular;  Laterality: Right;    IRRIGATION OF MEDIASTINUM  6/30/2022    Procedure: IRRIGATION, MEDIASTINUM;  Surgeon: Luis F Paige MD;  [facility];  Service: Cardiovascular;;    LEFT VENTRICULAR ASSIST DEVICE Left 6/23/2022    Procedure: INSERTION-LEFT VENTRICULAR ASSIST DEVICE;  Surgeon: Luis F Paige MD;  [facility];  Service: Cardiovascular;  Laterality: Left;    LEFT VENTRICULAR ASSIST DEVICE N/A 6/29/2022    Procedure: INSERTION-LEFT VENTRICULAR ASSIST DEVICE;  Surgeon: Luis F Paige MD;  [facility];  Service: Cardiovascular;  Laterality: N/A;    RIGHT HEART CATHETERIZATION Right 4/8/2022    Procedure: INSERTION, CATHETER, RIGHT HEART;  Surgeon: Luca Lopez Jr., MD;  Location: [facility] CATH LAB;  Service: Cardiology;  Laterality: Right;    RIGHT HEART CATHETERIZATION Right 4/19/2022    Procedure: INSERTION, CATHETER, RIGHT HEART;  Surgeon: Josh Pulido MD;  Location: [facility] CATH LAB;  Service: Cardiology;  Laterality: Right;    RIGHT HEART CATHETERIZATION Right 7/21/2022    Procedure: INSERTION, CATHETER, RIGHT HEART;  Surgeon: Dalia Crum MD;  Location: [facility] CATH LAB;  Service: Cardiology;  Laterality: Right;    RIGHT HEART CATHETERIZATION Right 10/31/2022    Procedure: INSERTION, CATHETER, RIGHT HEART;  Surgeon: Dalia Crum MD;  Location: [facility] CATH LAB;  Service: Cardiology;  Laterality: Right;    STERNAL WOUND CLOSURE N/A 6/30/2022    Procedure: CLOSURE, WOUND, STERNUM;  Surgeon: Luis F Paige MD;  Location: [facility] OR [address] [place] [address];  Service: Cardiovascular;  Laterality: N/A;     MD note 9/1: HPI: 39 y/o M w PMHx of DM, CHF, cardiomyopathy s/p LVAD and ICD, drug abuse, medication noncompliance, and seizures that was transferred from [place] with  acute/subacute L MCA stroke for higher level care and neurology consulted for AED/seizure management after AMS event yesterday. History obtained from chart review as patient intubated and sedated by the time of initial evaluation. He initially presented with aphasia and RSW, LKN approx 8 hrs PTA. There's mention of possible seizure-like activity, described as rhythmic shaking while asleep with return to baseline and then patient woke up with the above mentioned symptoms. Of note patient reportedly had been out of coumadin x 5 days, which was finally refilled 8/30 and was given 2 pills to take that night. In OSH ED, documented to have seizure-like activity with rhythmic shaking of BUE. CTH with L parietal hypodensity with subtle areas of hyperdensity concerning for acute/subacute infarct with petechial hemorrhage, CTA negative for LVO or critical stenosis. Determined not a candidate for acute interventions with thrombolytics/thrombectomy. Patient was then transferred to [facility] on [place] and while at cardiac floor he had a stoke code due to AMS and for expedited transfer to ICU for concerns of airway protection. Per noted patient was difficult to arouse and moaning. CT head unchanged from prior and there's noted hypodensity on L parietal lobe and questionable whether infarct vs other underlying pathology; patient unable to get MRI.   Patient transferred to SICU and intubated and placed on propofol with EEG placed overnight. No seizures or seizure focus noted but L sided dysfunction correlating w CT findings.      Head CT: 9/1/2023 Stable edema in the left parietal lobe.  Again it is unclear whether this reflects a recent infarct or infectious/inflammatory process including cerebritis.     Chest X-Rays: FINDINGS: 9/1/2023  Note made of left ventricular assist device and left-sided cardiac defibrillator.  Right upper extremity PICC terminates over the SVC.  Possible small left pleural effusion.  No confluent consolidation or  "pneumothorax.  Left basilar atelectasis noted.  Cardiac silhouette remains enlarged, noting postoperative change.     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 Highest level of education is 12th grade.     Subjective     Pt awake upon SLP entrance. Nursing in agreeance to SLP evaluation prior to entrance.        Pain/Comfort:  Pain Rating 1: 0/10    Respiratory Status: Room air    Objective:     Cognitive Status:    Orientation: pt oriented to self and month. Pt off by one year ( stating "it is either  or ) however when given a verbal cues, pt was able to stated , question 2/2 word finding difficulties.   Situation: pt requiring moderate phonemic cues to orient to situation 2/2 word finding difficulties   Safety awareness: Unable to provide adequate responses to VAD battery to wall plug in steps      Immediate recall: Able to recall 1/3 words however requiring moderate verbal cues to recall 3/3   Sequencing: disorganized thought patterns when sequencing VAD battery to wall plug in steps.      Receptive Language:   Comprehension:    Able to follow instructions during evaluation with no difficulties.     Pragmatics:    Adequate eye contact and conversational turn taking during casual conversation.     Expressive Language:  Verbal:    Word finding difficulties during conversational speech.     Generative naming: animals in one minute: 4 independently, however 11 with moderate verbal cues.   Confrontational namin/5 independently   Responsive naming: 3/3 independently      Motor Speech:  Speech nonfluent and appearing related to  however word finding difficulties, speech intelligibly 100% at conversational level.     Voice:   WFL    Visual-Spatial:   Clock drawing and time WFL     Reading:   tba      Written Expression:   WFL    Treatment: Pt seen for cognitive-linguistic evaluation. Pt currently tolerating diet per nursing staff and " pt. Pt stating new onset of word finding difficulties during this hospital stay. During casual conversation and generative naming task, pt with noted word finding difficulties and disfluencies. However, during all naming tasks pt was able to name items in the room and outside of the room with 100% accuracy.  When patient asked to sequence steps of VAD battery to wall plug in steps, pt with evident disorganized thought patterns and unable to appropriately name steps. Despite max verbal cues, pt was still unable to provide an adequate and safe response. SLP unsure of baseline VAD management. D/w pt need for ongoing language/cogntive treatment and concerns with safety awareness. Pt verbalizing understanding, however would benefit from ongoing education. Nursing updated and verbalizing understanding.Would recommend ongoing diagnostic treatment for language/cognitive-linguistic deficits. SLP will continue to follow.   Goals:   Multidisciplinary Problems       SLP Goals          Problem: SLP    Goal Priority Disciplines Outcome   SLP Goal     SLP Ongoing, Progressing   Description: Short Term Goals:   1.  Pt will complete delayed recall tasks with 80% accuracy with minimal verbal cues.  2. Pt will complete functional sequencing tasks with 80% accuracy with minimal verbal cues. .   3.Pt will complete functional safety problem solving tasks for further diagnostic information to help guide POC.   4. Pt will participate in ongoing language/cognitive diagnostic treatment to help guide POC.                           Plan:   Patient to be seen:  4 x/week   Plan of Care expires:   9/19  Plan of Care reviewed with:  patient (Nursing)   SLP Follow-Up:  Yes       Discharge recommendations:  Discharge Facility/Level of Care Needs: other (see comments)   Barriers to Discharge:  Level of Skilled Assistance Needed     Time Tracking:     SLP Treatment Date:   09/05/23  Speech Start Time:  0800  Speech Stop Time:  0816     Speech Total  Time (min):  16 min    Billable Minutes: Eval 8  and Self Care/Home Management Training 8    09/05/2023

## 2023-09-05 NOTE — PLAN OF CARE
2 goals met today. PT goals appropriate.    Please continue Progressive Mobility Protocol as appropriate.    Appropriate transfer level with nursing staff: ambulate 3-4x/day with SBA     Afia Klein PT, DPT  2023  Pager: [phone]    Problem: Physical Therapy  Goal: Physical Therapy Goal  Description: Goals to be met by: 23     Patient will increase functional independence with mobility by performin. Supine to sit with Modified [place] -met   2. Sit to stand transfer with Supervision - met   3. Gait  x 250 feet with Stand-by Assistance using AD if needed. - not met  4. Pt transfer bed to bedside chair with SBA. . - not met    Outcome: Ongoing, Progressing

## 2023-09-05 NOTE — NURSING
Attempted to round on patient, sterile DLES dressing change being completed at this time. Will attempt to see patient later today.

## 2023-09-05 NOTE — NURSING
Nurses Note -- 4 Eyes      9/5/2023   10:15 AM      Skin assessed during: Daily Assessment      [x] No Altered Skin Integrity Present    [x]Prevention Measures Documented      [] Yes- Altered Skin Integrity Present or Discovered   [] LDA Added if Not in Epic (Describe Wound)   [] New Altered Skin Integrity was Present on Admit and Documented in LDA   [] Wound Image Taken    Wound Care Consulted? No    Attending Nurse:  Giovana Mullen RN/Staff Member:  [place]

## 2023-09-05 NOTE — PROGRESS NOTES
Update    Pt presents in room aaox4 with pleasant affect. No caregivers present. Pt voices understanding of plan of care and states medical team hopes to step pt down soon. Pt reports mother and dgtr visited and spouse is trying to get off work to come visit. Pt reports coping well and denies further needs, questions, concerns at this time and none indicated. Providing ongoing psychosocial and counseling support, education, resources, assistance and discharge planning as indicated. Following and available.

## 2023-09-05 NOTE — ASSESSMENT & PLAN NOTE
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  -Coumadin held intially due to concern for petechial hemorrhage. Repeat CT stable so started on heparin bridge.Coumadin resumed. Goal INR 2.0-3.0. INR subtherapeutic today at 1.4  -LDH is stable.  Will continue to monitor daily  -Speed set at 5100, LSL 4700 rpm  -Echo: 7/31/23 EF: 10-15%, LVEDD: 6.87 cm.  No vegation appreciated.  Echo ordered and pending.     Procedure: Device Interrogation Including analysis of device parameters  Current Settings: Ventricular Assist Device  Review of device function is stable        9/5/2023    10:00 AM 9/5/2023     9:00 AM 9/5/2023     8:00 AM 9/5/2023     7:00 AM 9/5/2023     6:00 AM 9/5/2023     5:00 AM 9/5/2023     4:00 AM   TXP LVAD INTERROGATIONS   Type HeartMate3 HeartMate3 HeartMate3 HeartMate3 HeartMate3 HeartMate3 HeartMate3   Flow 4 3.5 3.5 3.6 3.6 3.5 3.8   Speed 5100 5100 5100 5100 5100 5100 5100   PI 5.6 8.2 6.3 6.6 6.7 6.7 6.4   Power (Serna) 3.7 3.7 3.7 3.7 3.7 3.8 3.6   LSL 4700 4700 4700 4700 4700 4700 4700   Pulsatility Intermittent pulse Intermittent pulse Intermittent pulse Intermittent pulse Intermittent pulse Intermittent pulse Intermittent pulse

## 2023-09-05 NOTE — PROGRESS NOTES
09/05/2023  Mirela Perez    Current provider:  Luca Lopez Jr.*    Device interrogation:      9/5/2023    10:00 AM 9/5/2023     9:00 AM 9/5/2023     8:00 AM 9/5/2023     7:00 AM 9/5/2023     6:00 AM 9/5/2023     5:00 AM 9/5/2023     4:00 AM   TXP LVAD INTERROGATIONS   Type HeartMate3 HeartMate3 HeartMate3 HeartMate3 HeartMate3 HeartMate3 HeartMate3   Flow 4 3.5 3.5 3.6 3.6 3.5 3.8   Speed 5100 5100 5100 5100 5100 5100 5100   PI 5.6 8.2 6.3 6.6 6.7 6.7 6.4   Power (Serna) 3.7 3.7 3.7 3.7 3.7 3.8 3.6   LSL 4700 4700 4700 4700 4700 4700 4700   Pulsatility Intermittent pulse Intermittent pulse Intermittent pulse Intermittent pulse Intermittent pulse Intermittent pulse Intermittent pulse          Rounded on Kevan Queen to ensure all mechanical assist device settings (IABP or VAD) were appropriate and all parameters were within limits.  I was able to ensure all back up equipment was present, the staff had no issues, and the Perfusion Department daily rounding was complete.      For implantable VADs: Interrogation of Ventricular assist device was performed with analysis of device parameters and review of device function. I have personally reviewed the interrogation findings and agree with findings as stated.     In emergency, the nursing units have been notified to contact the perfusion department either by:  Calling [phone] from 630am to 4pm Mon thru Fri, utilizing the On-Call Finder functionality of Epic and searching for Perfusion, or by contacting the hospital  from 4pm to 630am and on weekends and asking to speak with the perfusionist on call.    10:49 AM

## 2023-09-05 NOTE — PLAN OF CARE
Recommendations     1.) Recommend to continue with Cardiac diet, as tolerated- adding double portions as per pt's request- fluid per MD.      2.) Recommend honoring new dietary preferences.      3.) RD to monitor wt, PO intake, skin, labs.        Goals: to meet % of EEN/EPN by next RD f/u  Nutrition Goal Status: progressing towards goal  Communication of RD Recs: other (comment) (POC)

## 2023-09-05 NOTE — PROGRESS NOTES
[facility] - [facility] Intensive [facility]  Heart Transplant  Progress Note    Patient Name: Kvean Queen  MRN: [id number]  Admission Date: 8/31/2023  Hospital Length of Stay: 5 days  Attending Physician: Luca Lopez Jr.*  Primary Care Provider: Rosio Armendariz FNP  Principal Problem:Embolic stroke involving left middle cerebral artery    Subjective:     Interval History: No acute events overnight. No seizures noted. He is net +400cc today. CVP is 5. 2D echo is pending. Pt has transfer orders and is waiting for bed availability.     Continuous Infusions:   heparin (porcine) in D5W 14 Units/kg/hr (09/05/23 1000)    milrinone 20mg/100ml D5W (200mcg/ml) 0.25 mcg/kg/min (09/05/23 1000)     Scheduled Meds:   aspirin  81 mg Oral Daily    atorvastatin  40 mg Oral Daily    busPIRone  10 mg Oral BID    ceFAZolin (ANCEF) 8 g in dextrose 5 % (D5W) 500 mL CONTINUOUS INFUSION  8 g Intravenous Q24H    insulin detemir U-100  6 Units Subcutaneous BID    levETIRAcetam  1,000 mg Oral BID    mirtazapine  15 mg Oral Nightly    mupirocin   Nasal BID    pantoprazole  40 mg Oral Daily    polyethylene glycol  17 g Oral Daily    senna  8.6 mg Oral Daily    valsartan  40 mg Oral BID    warfarin  4 mg Oral Daily     PRN Meds:sodium chloride 0.9%, dextrose 10%, dextrose 10%, glucagon (human recombinant), glucose, glucose, insulin aspart U-100    Review of patient's allergies indicates:   Allergen Reactions    Bumex [bumetanide] Hives    Torsemide Hives     Objective:     Vital Signs (Most Recent):  Temp: 98.8 °F (37.1 °C) (09/05/23 0700)  Pulse: 92 (09/05/23 0728)  Resp: 10 (09/05/23 0700)  BP: (!) 80/0 (09/05/23 0700)  SpO2: 99 % (09/05/23 0700) Vital Signs (24h Range):  Temp:  [98 °F (36.7 °C)-98.8 °F (37.1 °C)] 98.8 °F (37.1 °C)  Pulse:  [] 92  Resp:  [7-75] 10  SpO2:  [96 %-99 %] 99 %  BP: (65-90)/(0) 80/0  Arterial Line BP: (78-87)/(65-70) 87/70     Patient Vitals for the past 72 hrs (Last 3 readings):   Weight    09/04/23 0500 90 kg (198 lb 6.6 oz)   09/03/23 0500 90 kg (198 lb 6.6 oz)     Body mass index is 24.8 kg/m².      Intake/Output Summary (Last 24 hours) at 9/5/2023 1047  Last data filed at 9/5/2023 1000  Gross per 24 hour   Intake 1170.67 ml   Output 1520 ml   Net -349.33 ml       Hemodynamic Parameters:       Telemetry: NSR        Physical Exam  Constitutional:       Appearance: Normal appearance.   HENT:      Head: Normocephalic and atraumatic.   Cardiovascular:      Rate and Rhythm: Normal rate and regular rhythm.      Pulses: Normal pulses.      Comments: Smooth VAD hum   Pulmonary:      Effort: Pulmonary effort is normal.      Breath sounds: Normal breath sounds.   Abdominal:      General: Bowel sounds are normal.      Palpations: Abdomen is soft.   Musculoskeletal:         General: Normal range of motion.      Cervical back: Normal range of motion and neck supple.      Right lower leg: No edema.      Left lower leg: No edema.   Skin:     General: Skin is warm and dry.   Neurological:      General: No focal deficit present.      Mental Status: He is alert and oriented to person, place, and time.            Significant Labs:  CBC:  Recent Labs   Lab 09/03/23  0410 09/04/23  0315 09/05/23  0319   WBC 10.29 8.64 7.90   RBC 3.86* 4.00* 3.86*   HGB 10.4* 10.8* 10.5*   HCT 33.7* 34.2* 34.0*    303 298   MCV 87 86 88   MCH 26.9* 27.0 27.2   MCHC 30.9* 31.6* 30.9*     BNP:  Recent Labs   Lab 08/31/23  1046   *     CMP:  Recent Labs   Lab 09/03/23  0410 09/04/23  0315 09/04/23  1534 09/05/23  0319   * 145*  --  264*   CALCIUM 8.5* 8.7  --  8.4*   ALBUMIN 2.5* 2.6*  --  2.4*   PROT 6.9 7.3  --  6.7    135*  --  135*   K 4.0 3.0* 3.2* 4.0   CO2 25 26  --  26   CL 98 100  --  100   BUN 10 7  --  4*   CREATININE 1.0 1.0  --  1.1   ALKPHOS 94 100  --  94   ALT <5* <5*  --  <5*   AST 17 16  --  17   BILITOT 1.0 0.8  --  0.6      Coagulation:   Recent Labs   Lab 09/03/23  0410 09/04/23  0314  09/05/23 0319 09/05/23 0436   INR 1.2 1.3* 1.4*  --    APTT 43.5*  43.5* 44.4*  44.4*  --  54.4*     LDH:  Recent Labs   Lab 09/03/23  0410 09/04/23 0315 09/05/23 0319   * 279* 226     Microbiology:  Microbiology Results (last 7 days)       ** No results found for the last 168 hours. **            BMP:   Recent Labs   Lab 09/05/23 0319   *   *   K 4.0      CO2 26   BUN 4*   CREATININE 1.1   CALCIUM 8.4*   MG 1.7     I have reviewed all pertinent labs within the past 24 hours.    Estimated Creatinine Clearance: 108.8 mL/min (based on SCr of 1.1 mg/dL).    Diagnostic Results:  I have reviewed all pertinent imaging results/findings within the past 24 hours.    Assessment and Plan:     Mr. Kevan Thacker is a 39 y/o AAM w/ PMH of NICM (possible Familial w/ father having LVAD and subsequent transplant) s/p DT-HM3 (6/3/2022), HFrEF, chronic DLES, MSSA bacteremia c/b L empyema (maintained on ancef, maria isabel 9/14), seizures, and IDDM2 who initially presented to [facility] with aphasia and right sided weekness. He was reportedly not taking his coumadin or keppra for the past week due to running out. As per chart review, patient started to experience symptoms around 1am when he went to sleep. Symptoms worsened and EMS was called around 3:30am. Upon admission patient underwent a CT head showed an acute to subacute infarct with possible petechial hemorrhage. Labs were significant for INR of 1.2. Patient was loaded with Keppra and [place] [facility] [place]      Upon arrival, patient was found to be hemodynamically stable w/ MAPs of 106. However, he was noted to be not alert or oriented and extremely lethargic. Patient was noted to have GCS 10-11 with minimally reactive pupils. Patient underwent stat CT brain which showed no acute changes since prior CT, and transferred to the ICU for closer monitoring. Once in the ICU patient was intubated for airway protection.      Of note, patient  was most recently hospitalized from 7/22/2023-8/8/2023 for sepsis/covod. Found to have loculated pleural effussion requiring chest tube placement and three doses of lytics. Effussion improved and patient was discharged with 6 week course of Ancef (to end on 9/14).        Home Medications:    Aspirin 81mg daily   Buspirone 10mg bid   Furosemide 80mg daily   Insulin detemir 25 units bid   Insulin aspart 14 units w/ meals   Milrinone 0.25   Mirtazapine 15mg nightly   Valsartan 40mg bi   Warfarin      Cardiac Imaging:    Echo (7/31/2023): HM3 at 5100. AV does not open. IV septum midline. EF 10-15%. LV severely dilated. Normal wall thickness. Severe global hypokinesis. RV not well visualized due to poor acoustic window. Biatrial enlargment. Mod TR.        * Embolic stroke involving left middle cerebral artery  - CTH with L parietal hypodensity with subtle areas of hyperdensity concerning for acute/subacute infarct with petechial hemorrhage, CTA negative for LVO or critical stenosis. Determined not a candidate for acute interventions with thrombolytics/thrombectomy.  - continue aspirin and statin. Repeat CT unremarkable   -On heparin and Coumadin   - PT, OT, Speech consulted.     Bacteremia due to Staphylococcus  -H/O chronic MSSA DLES infection for which he is on Doxycycline at home  -Blood cxs here +ve for MSSA through 7/28. Repeated 7/29 with NGTD. Repeated again 7/30 NGTD  -PICC line replaced 8/1  -ECHO done 7/31 and no vegetations appreciated   -CT 8/1 with loculated fluid along Left lateral hear border, thoracic surgery consulted, not a candidate for VATS. Chest tube placed with IR 8/3, pleural fluid cultures positive for GPC  -Continue Ancef 8g IV q 24 hours for total of 6 weeks with end date 9/14 (6 weeks after chest tube placement).     Seizure-like activity  - loaded with keppra in ED prior to arrival.   - EEG unremarkable.  Will continue keppra 1000mg bid as per Neuro recs   - Neurology consulted. Appreciate  recommendations.     LVAD (left ventricular assist device) present  -HeartMate 3 Implanted on 6/29/2022 as DT with RV failure on milrinone at 0.25 mcg/kg/min.  -Coumadin held intially due to concern for petechial hemorrhage. Repeat CT stable so started on heparin bridge.Coumadin resumed. Goal INR 2.0-3.0. INR subtherapeutic today at 1.4  -LDH is stable.  Will continue to monitor daily  -Speed set at 5100, LSL 4700 rpm  -Echo: 7/31/23 EF: 10-15%, LVEDD: 6.87 cm.  No vegation appreciated.  Echo ordered and pending.     Procedure: Device Interrogation Including analysis of device parameters  Current Settings: Ventricular Assist Device  Review of device function is stable        9/5/2023    10:00 AM 9/5/2023     9:00 AM 9/5/2023     8:00 AM 9/5/2023     7:00 AM 9/5/2023     6:00 AM 9/5/2023     5:00 AM 9/5/2023     4:00 AM   TXP LVAD INTERROGATIONS   Type HeartMate3 HeartMate3 HeartMate3 HeartMate3 HeartMate3 HeartMate3 HeartMate3   Flow 4 3.5 3.5 3.6 3.6 3.5 3.8   Speed 5100 5100 5100 5100 5100 5100 5100   PI 5.6 8.2 6.3 6.6 6.7 6.7 6.4   Power (Serna) 3.7 3.7 3.7 3.7 3.7 3.8 3.6   LSL 4700 4700 4700 4700 4700 4700 4700   Pulsatility Intermittent pulse Intermittent pulse Intermittent pulse Intermittent pulse Intermittent pulse Intermittent pulse Intermittent pulse        Acute combined systolic and diastolic congestive heart failure  -NICM s/p LVAD and on Milrinone   - Last Echo (7/31/2023): HM3 at 5100. AV does not open. IV septum midline. EF 10-15%. LV severely dilated. Normal wall thickness. Severe global hypokinesis. RV not well visualized due to poor acoustic window. Biatrial enlargment. Mod TR.    -diuresed on IV Lasix with good response since admission.  IV lasix has been on hold.  CVP: 5 this am. Euvolemic on exam.  Will continue to hold Lasix.   -Waiting for repeat echo   - Home GDMT: valsartan 40mg bid  - Home diuretic regimen: Lasix 80mg daily.   - Ionotropes: continue home milrinone 0.25.   - Strict I&Os  and daily weights.   - Maintain K>4 and Mg>2        Marco Rothman NP  Heart Transplant  [facility] - [facility]

## 2023-09-06 LAB
ALBUMIN SERPL BCP-MCNC: 2.7 G/DL (ref 3.5–5.2)
ALP SERPL-CCNC: 98 U/L (ref 55–135)
ALT SERPL W/O P-5'-P-CCNC: <5 U/L (ref 10–44)
ANION GAP SERPL CALC-SCNC: 10 MMOL/L (ref 8–16)
APTT PPP: 34 SEC (ref 21–32)
APTT PPP: 43 SEC (ref 21–32)
APTT PPP: 44.1 SEC (ref 21–32)
AST SERPL-CCNC: 19 U/L (ref 10–40)
BASOPHILS # BLD AUTO: 0.04 K/UL (ref 0–0.2)
BASOPHILS NFR BLD: 0.4 % (ref 0–1.9)
BILIRUB SERPL-MCNC: 0.4 MG/DL (ref 0.1–1)
BUN SERPL-MCNC: 6 MG/DL (ref 6–20)
CALCIUM SERPL-MCNC: 9 MG/DL (ref 8.7–10.5)
CHLORIDE SERPL-SCNC: 105 MMOL/L (ref 95–110)
CO2 SERPL-SCNC: 25 MMOL/L (ref 23–29)
CREAT SERPL-MCNC: 1.2 MG/DL (ref 0.5–1.4)
DIFFERENTIAL METHOD: ABNORMAL
EOSINOPHIL # BLD AUTO: 0.2 K/UL (ref 0–0.5)
EOSINOPHIL NFR BLD: 2.2 % (ref 0–8)
ERYTHROCYTE [DISTWIDTH] IN BLOOD BY AUTOMATED COUNT: 19 % (ref 11.5–14.5)
EST. GFR  (NO RACE VARIABLE): >60 ML/MIN/1.73 M^2
GLUCOSE SERPL-MCNC: 164 MG/DL (ref 70–110)
HCT VFR BLD AUTO: 34.4 % (ref 40–54)
HGB BLD-MCNC: 10.2 G/DL (ref 14–18)
IMM GRANULOCYTES # BLD AUTO: 0.02 K/UL (ref 0–0.04)
IMM GRANULOCYTES NFR BLD AUTO: 0.2 % (ref 0–0.5)
INR PPP: 1.3 (ref 0.8–1.2)
LDH SERPL L TO P-CCNC: 273 U/L (ref 110–260)
LYMPHOCYTES # BLD AUTO: 1.7 K/UL (ref 1–4.8)
LYMPHOCYTES NFR BLD: 18 % (ref 18–48)
MAGNESIUM SERPL-MCNC: 1.8 MG/DL (ref 1.6–2.6)
MCH RBC QN AUTO: 26.3 PG (ref 27–31)
MCHC RBC AUTO-ENTMCNC: 29.7 G/DL (ref 32–36)
MCV RBC AUTO: 89 FL (ref 82–98)
MONOCYTES # BLD AUTO: 0.8 K/UL (ref 0.3–1)
MONOCYTES NFR BLD: 8.2 % (ref 4–15)
NEUTROPHILS # BLD AUTO: 6.5 K/UL (ref 1.8–7.7)
NEUTROPHILS NFR BLD: 71 % (ref 38–73)
NRBC BLD-RTO: 0 /100 WBC
PHOSPHATE SERPL-MCNC: 2.5 MG/DL (ref 2.7–4.5)
PLATELET # BLD AUTO: 310 K/UL (ref 150–450)
PMV BLD AUTO: 10 FL (ref 9.2–12.9)
POCT GLUCOSE: 166 MG/DL (ref 70–110)
POCT GLUCOSE: 176 MG/DL (ref 70–110)
POCT GLUCOSE: 212 MG/DL (ref 70–110)
POCT GLUCOSE: 282 MG/DL (ref 70–110)
POTASSIUM SERPL-SCNC: 4.4 MMOL/L (ref 3.5–5.1)
PROT SERPL-MCNC: 7.1 G/DL (ref 6–8.4)
PROTHROMBIN TIME: 13.4 SEC (ref 9–12.5)
RBC # BLD AUTO: 3.88 M/UL (ref 4.6–6.2)
SODIUM SERPL-SCNC: 140 MMOL/L (ref 136–145)
WBC # BLD AUTO: 9.2 K/UL (ref 3.9–12.7)

## 2023-09-06 PROCEDURE — 85025 COMPLETE CBC W/AUTO DIFF WBC: CPT | Performed by: INTERNAL MEDICINE

## 2023-09-06 PROCEDURE — 25000003 PHARM REV CODE 250: Performed by: INTERNAL MEDICINE

## 2023-09-06 PROCEDURE — 20600001 HC STEP DOWN PRIVATE ROOM

## 2023-09-06 PROCEDURE — 99233 SBSQ HOSP IP/OBS HIGH 50: CPT | Mod: ,,, | Performed by: REGISTERED NURSE

## 2023-09-06 PROCEDURE — 25000003 PHARM REV CODE 250: Performed by: PHYSICIAN ASSISTANT

## 2023-09-06 PROCEDURE — 63600175 PHARM REV CODE 636 W HCPCS: Performed by: PHYSICIAN ASSISTANT

## 2023-09-06 PROCEDURE — 92507 TX SP LANG VOICE COMM INDIV: CPT

## 2023-09-06 PROCEDURE — 84100 ASSAY OF PHOSPHORUS: CPT | Performed by: INTERNAL MEDICINE

## 2023-09-06 PROCEDURE — 83615 LACTATE (LD) (LDH) ENZYME: CPT | Performed by: PHYSICIAN ASSISTANT

## 2023-09-06 PROCEDURE — 93750 PR INTERROGATE VENT ASSIST DEV, IN PERSON, W PHYSICIAN ANALYSIS: ICD-10-PCS | Mod: ,,, | Performed by: INTERNAL MEDICINE

## 2023-09-06 PROCEDURE — 85730 THROMBOPLASTIN TIME PARTIAL: CPT | Performed by: INTERNAL MEDICINE

## 2023-09-06 PROCEDURE — 97535 SELF CARE MNGMENT TRAINING: CPT

## 2023-09-06 PROCEDURE — 99233 PR SUBSEQUENT HOSPITAL CARE,LEVL III: ICD-10-PCS | Mod: ,,, | Performed by: REGISTERED NURSE

## 2023-09-06 PROCEDURE — 63600175 PHARM REV CODE 636 W HCPCS: Performed by: STUDENT IN AN ORGANIZED HEALTH CARE EDUCATION/TRAINING PROGRAM

## 2023-09-06 PROCEDURE — 25000003 PHARM REV CODE 250: Performed by: STUDENT IN AN ORGANIZED HEALTH CARE EDUCATION/TRAINING PROGRAM

## 2023-09-06 PROCEDURE — 85610 PROTHROMBIN TIME: CPT | Performed by: INTERNAL MEDICINE

## 2023-09-06 PROCEDURE — 80053 COMPREHEN METABOLIC PANEL: CPT | Performed by: INTERNAL MEDICINE

## 2023-09-06 PROCEDURE — 27000248 HC VAD-ADDITIONAL DAY

## 2023-09-06 PROCEDURE — 25000003 PHARM REV CODE 250: Performed by: REGISTERED NURSE

## 2023-09-06 PROCEDURE — 83735 ASSAY OF MAGNESIUM: CPT | Performed by: INTERNAL MEDICINE

## 2023-09-06 PROCEDURE — 85730 THROMBOPLASTIN TIME PARTIAL: CPT | Mod: 91 | Performed by: INTERNAL MEDICINE

## 2023-09-06 PROCEDURE — 93750 INTERROGATION VAD IN PERSON: CPT | Mod: ,,, | Performed by: INTERNAL MEDICINE

## 2023-09-06 RX ORDER — FUROSEMIDE 80 MG/1
80 TABLET ORAL DAILY
Status: DISCONTINUED | OUTPATIENT
Start: 2023-09-06 | End: 2023-09-11 | Stop reason: HOSPADM

## 2023-09-06 RX ORDER — WARFARIN 3 MG/1
6 TABLET ORAL DAILY
Status: DISCONTINUED | OUTPATIENT
Start: 2023-09-06 | End: 2023-09-11 | Stop reason: HOSPADM

## 2023-09-06 RX ORDER — MORPHINE SULFATE 2 MG/ML
2 INJECTION, SOLUTION INTRAMUSCULAR; INTRAVENOUS ONCE
Status: COMPLETED | OUTPATIENT
Start: 2023-09-06 | End: 2023-09-06

## 2023-09-06 RX ORDER — ACETAMINOPHEN 325 MG/1
650 TABLET ORAL EVERY 6 HOURS PRN
Status: DISCONTINUED | OUTPATIENT
Start: 2023-09-06 | End: 2023-09-11 | Stop reason: HOSPADM

## 2023-09-06 RX ADMIN — WARFARIN SODIUM 6 MG: 3 TABLET ORAL at 04:09

## 2023-09-06 RX ADMIN — INSULIN DETEMIR 6 UNITS: 100 INJECTION, SOLUTION SUBCUTANEOUS at 08:09

## 2023-09-06 RX ADMIN — MILRINONE LACTATE IN DEXTROSE 0.25 MCG/KG/MIN: 200 INJECTION, SOLUTION INTRAVENOUS at 10:09

## 2023-09-06 RX ADMIN — FUROSEMIDE 80 MG: 80 TABLET ORAL at 11:09

## 2023-09-06 RX ADMIN — ATORVASTATIN CALCIUM 40 MG: 20 TABLET, FILM COATED ORAL at 08:09

## 2023-09-06 RX ADMIN — MILRINONE LACTATE IN DEXTROSE 0.25 MCG/KG/MIN: 200 INJECTION, SOLUTION INTRAVENOUS at 08:09

## 2023-09-06 RX ADMIN — MIRTAZAPINE 15 MG: 15 TABLET, FILM COATED ORAL at 08:09

## 2023-09-06 RX ADMIN — BUSPIRONE HYDROCHLORIDE 10 MG: 10 TABLET ORAL at 08:09

## 2023-09-06 RX ADMIN — SENNOSIDES 8.6 MG: 8.6 TABLET, FILM COATED ORAL at 08:09

## 2023-09-06 RX ADMIN — VALSARTAN 40 MG: 40 TABLET, FILM COATED ORAL at 08:09

## 2023-09-06 RX ADMIN — MORPHINE SULFATE 2 MG: 2 INJECTION, SOLUTION INTRAMUSCULAR; INTRAVENOUS at 05:09

## 2023-09-06 RX ADMIN — LEVETIRACETAM 1000 MG: 500 TABLET, FILM COATED ORAL at 08:09

## 2023-09-06 RX ADMIN — PANTOPRAZOLE SODIUM 40 MG: 40 TABLET, DELAYED RELEASE ORAL at 08:09

## 2023-09-06 RX ADMIN — ACETAMINOPHEN 650 MG: 325 TABLET ORAL at 12:09

## 2023-09-06 RX ADMIN — ACETAMINOPHEN 650 MG: 325 TABLET ORAL at 03:09

## 2023-09-06 RX ADMIN — ASPIRIN 81 MG 81 MG: 81 TABLET ORAL at 08:09

## 2023-09-06 RX ADMIN — INSULIN ASPART 6 UNITS: 100 INJECTION, SOLUTION INTRAVENOUS; SUBCUTANEOUS at 04:09

## 2023-09-06 RX ADMIN — HEPARIN SODIUM 14 UNITS/KG/HR: 10000 INJECTION, SOLUTION INTRAVENOUS at 06:09

## 2023-09-06 RX ADMIN — CEFAZOLIN 8 G: 10 INJECTION, POWDER, FOR SOLUTION INTRAVENOUS at 02:09

## 2023-09-06 RX ADMIN — INSULIN ASPART 2 UNITS: 100 INJECTION, SOLUTION INTRAVENOUS; SUBCUTANEOUS at 08:09

## 2023-09-06 NOTE — NURSING TRANSFER
Nursing Transfer Note      9/6/2023   1:18 AM    Nurse giving handoff:Fausto  Nurse receiving handoff:Tara    Reason patient is being transferred: Stepdown    Transfer To: CSU from SICU    Transfer via wheelchair    Transfer with cardiac monitoring    Transported by RN      Order for Tele Monitor? No    Additional Lines: none    4eyes on Skin: yes    Medicines sent: none    Any special needs or follow-up needed: no    Patient belongings transferred with patient: Yes    Chart send with patient: Yes    Notified: spouse    Upon arrival to floor: cardiac monitor applied, patient oriented to room, call bell in reach, and bed in lowest position

## 2023-09-06 NOTE — ASSESSMENT & PLAN NOTE
-NICM s/p LVAD and on Milrinone @ 0.25   -Echo on (9/5/23): HM3 at 5100.The aortic valve does not open. septum is at midline. LV severely dilated. severely reduced systolic function with EF 10 -15%. There is normal diastolic function. RV Systolic function is normal. Mild MR, Severe TR  - Home GDMT: valsartan 40mg bid  - Home diuretic regimen: Lasix 80mg daily. Resuming today   - Ionotropes: continue home milrinone 0.25.   - Strict I&Os and daily weights.   - Maintain K>4 and Mg>2

## 2023-09-06 NOTE — PT/OT/SLP PROGRESS
Speech Language Pathology Treatment    Patient Name:  Kevan Queen   MRN:  [id number]  Admitting Diagnosis: Embolic stroke involving left middle cerebral artery    Recommendations:                 General Recommendations:  Speech/language therapy  Diet recommendations:  Regular, Liquid Diet Level: Thin   Aspiration Precautions: Standard aspiration precautions   General Precautions: Standard, LVAD  Communication strategies:  none    Assessment:     Kevan Queen is a 38 y.o. male with a new onset of word finding difficulties during this hospitalization.   Subjective     Pt seen sitting up in chair comfortably upon SLP arrival.      Pain/Comfort:  Pain Rating 1: 0/10    Respiratory Status: Room air    Objective:     Has the patient been evaluated by SLP for swallowing?   Yes  Keep patient NPO? No   Current Respiratory Status:    Room air     Pt seen for ongoing speech/language therapy. Pt stating no difficulties with cognitive, speech or receptive language at this time. Pt alert and oriented x4 and able to recall events of the day and previous SLP session. Pt continues to endorse ongoing world finding deficits during this hospital. Pt stating baseline disfluent speech that has not increased during this hospitalization. Pt with appropriate conversational speech with x1 instance of word finding difficulty/hesitation. Pt able to utilize strategies during word finding difficulties independently. Generative naming task revealing 5 items named in one minutes independently, however when given a verbal cue pt able to name 9 items. During descriptive naming tasks, patient with 3/3 trials independently. Pt able to complete functional time management/math tasks with 100% accuracy independently. D/w pt safety precautions while in the hospital and strategies for word finding difficulties. Pt verbalizing and demonstrating good understanding. RN updated and verbalizing understanding. SLP will continue to follow.   Multidisciplinary  Problems       SLP Goals          Problem: SLP    Goal Priority Disciplines Outcome   SLP Goal     SLP Ongoing, Progressing   Description: Short Term Goals:   1.  Pt will complete delayed recall tasks with 80% accuracy with minimal verbal cues.  2. Pt will complete functional sequencing tasks with 80% accuracy with minimal verbal cues. .   3.Pt will complete functional safety problem solving tasks with 80% accuracy with minimal verbal cues.   4. Pt will participate in ongoing language/cognitive diagnostic treatment to help guide POC.                           Plan:     Patient to be seen:  4 x/week   Plan of Care expires:   9/19/2023  Plan of Care reviewed with:  patient, other (see comments) (Nursing)   SLP Follow-Up:  Yes       Discharge recommendations:  other (see comments)   Barriers to Discharge:  None    Time Tracking:     SLP Treatment Date:   09/06/23  Speech Start Time:  1330  Speech Stop Time:  1350     Speech Total Time (min):  20 min    Billable Minutes: Speech Therapy Individual 10 and Self Care/Home Management Training 10    09/06/2023

## 2023-09-06 NOTE — SUBJECTIVE & OBJECTIVE
Interval History: No acute events overnight. He is net -250cc today. 2D echo showed EF 10-15%, mild MR, severe TR, septum is midline, and AV does not open. Home dose of Lasix will be resumed at 80mg QD. INR remains subtherapeutic at 1.3. Increasing Coumadin dose to 6mg.       Continuous Infusions:   heparin (porcine) in D5W 14 Units/kg/hr (09/06/23 0657)    milrinone 20mg/100ml D5W (200mcg/ml) 0.25 mcg/kg/min (09/06/23 0805)     Scheduled Meds:   aspirin  81 mg Oral Daily    atorvastatin  40 mg Oral Daily    busPIRone  10 mg Oral BID    ceFAZolin (ANCEF) 8 g in dextrose 5 % (D5W) 500 mL CONTINUOUS INFUSION  8 g Intravenous Q24H    furosemide  80 mg Oral Daily    insulin detemir U-100  6 Units Subcutaneous BID    levETIRAcetam  1,000 mg Oral BID    mirtazapine  15 mg Oral Nightly    pantoprazole  40 mg Oral Daily    polyethylene glycol  17 g Oral Daily    senna  8.6 mg Oral Daily    valsartan  40 mg Oral BID    warfarin  6 mg Oral Daily     PRN Meds:sodium chloride 0.9%, acetaminophen, dextrose 10%, dextrose 10%, glucagon (human recombinant), glucose, glucose, insulin aspart U-100    Review of patient's allergies indicates:   Allergen Reactions    Bumex [bumetanide] Hives    Torsemide Hives     Objective:     Vital Signs (Most Recent):  Temp: 96.3 °F (35.7 °C) (09/06/23 0724)  Pulse: 87 (09/06/23 0800)  Resp: 20 (09/06/23 0724)  BP: 101/73 (09/06/23 0724)  SpO2: 99 % (09/06/23 0724) Vital Signs (24h Range):  Temp:  [96.3 °F (35.7 °C)-98.8 °F (37.1 °C)] 96.3 °F (35.7 °C)  Pulse:  [] 87  Resp:  [18-52] 20  SpO2:  [96 %-100 %] 99 %  BP: ()/(0-73) 101/73     Patient Vitals for the past 72 hrs (Last 3 readings):   Weight   09/06/23 0920 89.6 kg (197 lb 8.5 oz)   09/05/23 1010 89.8 kg (198 lb)   09/04/23 0500 90 kg (198 lb 6.6 oz)       Body mass index is 24.69 kg/m².      Intake/Output Summary (Last 24 hours) at 9/6/2023 1008  Last data filed at 9/6/2023 0924  Gross per 24 hour   Intake 827.62 ml   Output 925  ml   Net -97.38 ml         Hemodynamic Parameters:       Telemetry: NSR        Physical Exam  Constitutional:       Appearance: Normal appearance.   HENT:      Head: Normocephalic and atraumatic.   Cardiovascular:      Rate and Rhythm: Normal rate and regular rhythm.      Pulses: Normal pulses.      Comments: Smooth VAD hum   Pulmonary:      Effort: Pulmonary effort is normal.      Breath sounds: Normal breath sounds.   Abdominal:      General: Bowel sounds are normal.      Palpations: Abdomen is soft.   Musculoskeletal:         General: Normal range of motion.      Cervical back: Normal range of motion and neck supple.      Right lower leg: No edema.      Left lower leg: No edema.   Skin:     General: Skin is warm and dry.   Neurological:      General: No focal deficit present.      Mental Status: He is alert and oriented to person, place, and time.            Significant Labs:  CBC:  Recent Labs   Lab 09/04/23 0315 09/05/23 0319 09/06/23  0429   WBC 8.64 7.90 9.20   RBC 4.00* 3.86* 3.88*   HGB 10.8* 10.5* 10.2*   HCT 34.2* 34.0* 34.4*    298 310   MCV 86 88 89   MCH 27.0 27.2 26.3*   MCHC 31.6* 30.9* 29.7*       BNP:  Recent Labs   Lab 08/31/23  1046   *       CMP:  Recent Labs   Lab 09/04/23 0315 09/04/23  1534 09/05/23 0319 09/06/23  0429   *  --  264* 164*   CALCIUM 8.7  --  8.4* 9.0   ALBUMIN 2.6*  --  2.4* 2.7*   PROT 7.3  --  6.7 7.1   *  --  135* 140   K 3.0* 3.2* 4.0 4.4   CO2 26  --  26 25     --  100 105   BUN 7  --  4* 6   CREATININE 1.0  --  1.1 1.2   ALKPHOS 100  --  94 98   ALT <5*  --  <5* <5*   AST 16  --  17 19   BILITOT 0.8  --  0.6 0.4        Coagulation:   Recent Labs   Lab 09/04/23 0315 09/05/23 0319 09/05/23  0436 09/05/23  1306 09/05/23  1908 09/06/23  0429   INR 1.3* 1.4*  --   --   --  1.3*   APTT 44.4*  44.4*  --    < > 47.8* 39.6* 34.0*    < > = values in this interval not displayed.       LDH:  Recent Labs   Lab 09/04/23 0315 09/05/23  0319  09/06/23 0429   * 226 273*       Microbiology:  Microbiology Results (last 7 days)       ** No results found for the last 168 hours. **            BMP:   Recent Labs   Lab 09/06/23 0429   *      K 4.4      CO2 25   BUN 6   CREATININE 1.2   CALCIUM 9.0   MG 1.8       I have reviewed all pertinent labs within the past 24 hours.    Estimated Creatinine Clearance: 99.8 mL/min (based on SCr of 1.2 mg/dL).    Diagnostic Results:  I have reviewed all pertinent imaging results/findings within the past 24 hours.

## 2023-09-06 NOTE — PT/OT/SLP PROGRESS
Occupational Therapy   Treatment    Name: Kevan Queen  MRN: [id number]  Admitting Diagnosis:  Embolic stroke involving left middle cerebral artery       Recommendations:     Discharge Recommendations: other (see comments)  Discharge Equipment Recommendations:  none  Barriers to discharge:  None    Assessment:     Kevan Queen is a 38 y.o. male with a medical diagnosis of Embolic stroke involving left middle cerebral artery.  He presents with supervision for donning socks and standing grooming today. Patient found on battery power that he reports sleeping overnight (checked battery level prior to session and on 3 bars) and missing waist strap for consolidation bag. Patient ambulated around room today with preference not to complete out room activity, but agreeable to sit in bedside chair after session. Performance deficits affecting function are impaired endurance, impaired functional mobility, decreased safety awareness.     Rehab Prognosis:  Good; patient would benefit from acute skilled OT services to address these deficits and reach maximum level of function.       Plan:     Patient to be seen 3 x/week to address the above listed problems via self-care/home management, therapeutic activities, therapeutic exercises, neuromuscular re-education  Plan of Care Expires:    Plan of Care Reviewed with: patient    Subjective     Chief Complaint: speech deficits after MCA stroke   Patient/Family Comments/goals: get better and go home   Pain/Comfort:  Pain Rating 1: 0/10  Pain Rating Post-Intervention 1: 0/10    Objective:     Communicated with: nursing prior to session.  Patient found HOB elevated with LVAD, blood pressure cuff, pulse ox (continuous), PICC line upon OT entry to room.    General Precautions: Standard, fall, LVAD    Orthopedic Precautions:N/A  Braces: N/A  Respiratory Status: Room air     Occupational Performance:     Bed Mobility:    Patient completed Supine to Sit with supervision   Patient sat EOB for ~  4 minutes with Supervision or Set-up Assistance    Functional Mobility/Transfers:  Patient completed Sit <> Stand Transfer with stand by assistance  with  no assistive device   Functional Mobility: patient ambulated to/from bathroom and then around room (35 ft total) with close SBA and no AD    Activities of Daily Living:  Grooming: stand by assistance to wash face and brush teeth while standing at sink  Lower Body Dressing: supervision to don socks while sitting EOB      AMPAC 6 Click ADL: 23    Treatment & Education:  Assessment of  strength to determine need for hand strengthening device; patient L>R slightly but reports L hand dominance and feels like strength is back to baseline    Patient educated on:   -purpose of OT and OT POC  -facilitation and education on proper body mechanics, energy conservation, and safety  -importance of early mobility and out of bed activities with staff assist  -overall benefits of therapy     All questions answered within OT scope and to patient's satisfaction    Patient left up in chair with all lines intact and call button in reach    GOALS:   Multidisciplinary Problems       Occupational Therapy Goals          Problem: Occupational Therapy    Goal Priority Disciplines Outcome Interventions   Occupational Therapy Goal     OT, PT/OT Ongoing, Progressing    Description: Goals to be met by: 9/22/2023     Patient will increase functional independence with ADLs by performing:    UE Dressing with Set-up Assistance.  Grooming while standing at sink with Modified [place].  Toileting from toilet with Modified [place] for hygiene and clothing management.   Stand pivot transfers with Modified [place].  Toilet transfer to toilet with Modified [place].                         Time Tracking:     OT Date of Treatment: 09/06/23  OT Start Time: 1015  OT Stop Time: 1032  OT Total Time (min): 17 min    Billable Minutes:Self Care/Home Management 17               9/6/2023

## 2023-09-06 NOTE — CARE UPDATE
-Glucose Goal 140-180    -A1C:   Hemoglobin A1C   Date Value Ref Range Status   08/31/2023 6.9 (H) 4.0 - 5.6 % Final     Comment:     ADA Screening Guidelines:  5.7-6.4%  Consistent with prediabetes  >or=6.5%  Consistent with diabetes    High levels of fetal hemoglobin interfere with the HbA1C  assay. Heterozygous hemoglobin variants (HbS, HgC, etc)do  not significantly interfere with this assay.   However, presence of multiple variants may affect accuracy.           -HOME REGIMEN:   -Levemir 22 units BID.   -Novolog 16 units TIDWM in addition to the following correction scale:     150 - 200 + 1 unit     201 - 250 + 2 units     251 - 300 + 3 units     301 - 350 + 4 units      > 350   + 5 units    -GLUCOSE TREND FOR THE PAST 24HRS:   Recent Labs   Lab 09/04/23  2137 09/05/23  0438 09/05/23  0715 09/05/23  1118 09/05/23  1717 09/05/23  2139   POCTGLUCOSE 134* 157* 137* 156* 208* 145*         -NO HYPOGYCEMIAS NOTED     - Diet  Diet Cardiac Double Portions; Fluid - 1500mL; Thin    T2DM.  LVAD.  BG stable on current regimen.    Plan:   - Levemir (Insulin Detemir) to 6 units BID  - Novolog (Insulin Aspart) prn for BG excursions MDC SSI (150/25)  - BG checks ac/hs   - Hypoglycemia protocol in place      ** Please notify Endocrine for any change and/or advance in diet**  ** Please call Endocrine for any BG related issues **     Discharge Planning:   TBD. Please notify endocrinology prior to discharge.

## 2023-09-06 NOTE — NURSING
RN educated pt about the importance of not sleeping on LVAD batteries but pt insisted to sleep on batteries. Instructed to pt to call RN if heard any LVAD alarm.

## 2023-09-06 NOTE — NURSING
Patient arrived to the unit from SICU. Telemetry box applied and vital signs documented in flow sheet. LVAD inventory done. Oriented to room, call bell with in reach, bed is in low lock position. Will continue to monitor.

## 2023-09-06 NOTE — PROGRESS NOTES
09/06/2023  Mirela Perez    Current provider:  Marlo Marques MD    Device interrogation:      9/6/2023     8:00 AM 9/6/2023     4:32 AM 9/6/2023    12:45 AM 9/6/2023    12:00 AM 9/5/2023    11:00 PM 9/5/2023    10:00 PM 9/5/2023     9:00 PM   TXP LVAD INTERROGATIONS   Type HeartMate3 HeartMate3 HeartMate3 HeartMate3 HeartMate3 HeartMate3 HeartMate3   Flow 4 4 4.1 4 4.1 4.1 4   Speed 5100 5100 5100 5100 5100 5100 5100   PI 4.7 4.9 4.6 4.5 4.7 4.7 4.5   Power (Serna) 3.6 3.7 3.6 3.5 3.6 3.6 3.6   LSL 4700 4700 4700 4700 4700 4700 4700   Pulsatility No Pulse No Pulse No Pulse Intermittent pulse Intermittent pulse Intermittent pulse Intermittent pulse          Rounded on Keavn Queen to ensure all mechanical assist device settings (IABP or VAD) were appropriate and all parameters were within limits.  I was able to ensure all back up equipment was present, the staff had no issues, and the Perfusion Department daily rounding was complete.      For implantable VADs: Interrogation of Ventricular assist device was performed with analysis of device parameters and review of device function. I have personally reviewed the interrogation findings and agree with findings as stated.     In emergency, the nursing units have been notified to contact the perfusion department either by:  Calling [phone] from 630am to 4pm Mon thru Fri, utilizing the On-Call Finder functionality of Epic and searching for Perfusion, or by contacting the hospital  from 4pm to 630am and on weekends and asking to speak with the perfusionist on call.    10:15 AM

## 2023-09-06 NOTE — PLAN OF CARE
Problem: Adult Inpatient Plan of Care  Goal: Plan of Care Review  Outcome: Ongoing, Progressing  Goal: Patient-Specific Goal (Individualized)  Outcome: Ongoing, Progressing  Goal: Absence of Hospital-Acquired Illness or Injury  Outcome: Ongoing, Progressing  Goal: Optimal Comfort and Wellbeing  Outcome: Ongoing, Progressing     Problem: Diabetes Comorbidity  Goal: Blood Glucose Level Within Targeted Range  Outcome: Ongoing, Progressing     Problem: Glycemic Control Impaired (Sepsis/Septic Shock)  Goal: Blood Glucose Level Within Desired Range  Outcome: Ongoing, Progressing     Problem: Infection Progression (Sepsis/Septic Shock)  Goal: Absence of Infection Signs and Symptoms  Outcome: Ongoing, Progressing     Problem: Nutrition Impaired (Sepsis/Septic Shock)  Goal: Optimal Nutrition Intake  Outcome: Ongoing, Progressing     Problem: Infection  Goal: Absence of Infection Signs and Symptoms  Outcome: Ongoing, Progressing     Problem: Skin Injury Risk Increased  Goal: Skin Health and Integrity  Outcome: Ongoing, Progressing     Problem: Adjustment to Illness (Stroke, Ischemic/Transient Ischemic Attack)  Goal: Optimal Coping  Outcome: Ongoing, Progressing     Problem: Cerebral Tissue Perfusion (Stroke, Ischemic/Transient Ischemic Attack)  Goal: Optimal Cerebral Tissue Perfusion  Outcome: Ongoing, Progressing     Problem: Cognitive Impairment (Stroke, Ischemic/Transient Ischemic Attack)  Goal: Optimal Cognitive Function  Outcome: Ongoing, Progressing     Problem: Communication Impairment (Stroke, Ischemic/Transient Ischemic Attack)  Goal: Improved Communication Skills  Outcome: Ongoing, Progressing     Problem: Functional Ability Impaired (Stroke, Ischemic/Transient Ischemic Attack)  Goal: Optimal Functional Ability  Outcome: Ongoing, Progressing     Problem: Respiratory Compromise (Stroke, Ischemic/Transient Ischemic Attack)  Goal: Effective Oxygenation and Ventilation  Outcome: Ongoing, Progressing     Problem:  Sensorimotor Impairment (Stroke, Ischemic/Transient Ischemic Attack)  Goal: Improved Sensorimotor Function  Outcome: Ongoing, Progressing     Problem: Urinary Elimination Impaired (Stroke, Ischemic/Transient Ischemic Attack)  Goal: Effective Urinary Elimination  Outcome: Ongoing, Progressing     Problem: Fall Injury Risk  Goal: Absence of Fall and Fall-Related Injury  Outcome: Ongoing, Progressing     Problem: Adjustment to Device (Ventricular Assist Device)  Goal: Optimal Adjustment to Device  Outcome: Ongoing, Progressing     Problem: Embolism (Ventricular Assist Device)  Goal: Absence of Embolism Signs and Symptoms  Outcome: Ongoing, Progressing     Problem: Hemodynamic Instability (Ventricular Assist Device)  Goal: Optimal Blood Flow  Outcome: Ongoing, Progressing     Problem: Infection (Ventricular Assist Device)  Goal: Absence of Infection Signs and Symptoms  Outcome: Ongoing, Progressing     Problem: Right-Sided Heart Compromise (Ventricular Assist Device)  Goal: Effective Right-Sided Heart Function  Outcome: Ongoing, Progressing   Plan of care discussed with patient.  Patient ambulating independently, fall precautions in place. LVAD DP and numbers WNL, smooth LVAD hum. Patient has no complaints of pain. AAOx4 and VSS. Prn pain meds given. Discussed medications and care.  Patient has no questions at this time. Will continue to monitor.

## 2023-09-06 NOTE — NURSING
Rounded on patient at bedside. Patient is AAOx4.  LVAD history interrogated with no abnormal events noted.  LVAD parameters currently WNL.  Patient reports sleeping on batteries at home, re-educated on use and importance of MPU while sleeping. Patient states it is too inconvenient to be connected to the cables and he always connects to fully charged batteries before sleeping.  Will continue to round on patinet.

## 2023-09-06 NOTE — ASSESSMENT & PLAN NOTE
- Admitted for Left MCA stroke  - Implant Date: 6/29/2022 with Dr. Paige  - Speed: 5100  - Low Flow Events: None overnight  - Interval History was obtained from team member during rounding today.  - VAD/ANABELLE teaching was performed with patient.  - Mobilization/Physical Therapy is ongoing.  - Anticoagulation: Coumadin/Heparin  - INR: 1.4  - Urine Output: 1025  - BUN: 6   - Creat: 1.2  - LDH: 273  - Awake and alert in bed during assessment    More than 50 percent of the care dominated counseling and coordinating care with different team members. The VAD was interrogated and no significant findings were found in the history. All these findings are documented in the note above.

## 2023-09-06 NOTE — PROGRESS NOTES
[facility] - Cardiology Stepdown  Heart Transplant  Progress Note    Patient Name: Kevan Queen  MRN: [id number]  Admission Date: 8/31/2023  Hospital Length of Stay: 6 days  Attending Physician: Marlo Marques MD  Primary Care Provider: Rosio Armendariz FNP  Principal Problem:Embolic stroke involving left middle cerebral artery    Subjective:     Interval History: No acute events overnight. He is net -250cc today. 2D echo showed EF 10-15%, mild MR, severe TR, septum is midline, and AV does not open. Home dose of Lasix will be resumed at 80mg QD. INR remains subtherapeutic at 1.3. Increasing Coumadin dose to 6mg.       Continuous Infusions:   heparin (porcine) in D5W 14 Units/kg/hr (09/06/23 0657)    milrinone 20mg/100ml D5W (200mcg/ml) 0.25 mcg/kg/min (09/06/23 0805)     Scheduled Meds:   aspirin  81 mg Oral Daily    atorvastatin  40 mg Oral Daily    busPIRone  10 mg Oral BID    ceFAZolin (ANCEF) 8 g in dextrose 5 % (D5W) 500 mL CONTINUOUS INFUSION  8 g Intravenous Q24H    furosemide  80 mg Oral Daily    insulin detemir U-100  6 Units Subcutaneous BID    levETIRAcetam  1,000 mg Oral BID    mirtazapine  15 mg Oral Nightly    pantoprazole  40 mg Oral Daily    polyethylene glycol  17 g Oral Daily    senna  8.6 mg Oral Daily    valsartan  40 mg Oral BID    warfarin  6 mg Oral Daily     PRN Meds:sodium chloride 0.9%, acetaminophen, dextrose 10%, dextrose 10%, glucagon (human recombinant), glucose, glucose, insulin aspart U-100    Review of patient's allergies indicates:   Allergen Reactions    Bumex [bumetanide] Hives    Torsemide Hives     Objective:     Vital Signs (Most Recent):  Temp: 96.3 °F (35.7 °C) (09/06/23 0724)  Pulse: 87 (09/06/23 0800)  Resp: 20 (09/06/23 0724)  BP: 101/73 (09/06/23 0724)  SpO2: 99 % (09/06/23 0724) Vital Signs (24h Range):  Temp:  [96.3 °F (35.7 °C)-98.8 °F (37.1 °C)] 96.3 °F (35.7 °C)  Pulse:  [] 87  Resp:  [18-52] 20  SpO2:  [96 %-100 %] 99 %  BP: ()/(0-73)  101/73     Patient Vitals for the past 72 hrs (Last 3 readings):   Weight   09/06/23 0920 89.6 kg (197 lb 8.5 oz)   09/05/23 1010 89.8 kg (198 lb)   09/04/23 0500 90 kg (198 lb 6.6 oz)       Body mass index is 24.69 kg/m².      Intake/Output Summary (Last 24 hours) at 9/6/2023 1008  Last data filed at 9/6/2023 0924  Gross per 24 hour   Intake 827.62 ml   Output 925 ml   Net -97.38 ml         Hemodynamic Parameters:       Telemetry: NSR        Physical Exam  Constitutional:       Appearance: Normal appearance.   HENT:      Head: Normocephalic and atraumatic.   Cardiovascular:      Rate and Rhythm: Normal rate and regular rhythm.      Pulses: Normal pulses.      Comments: Smooth VAD hum   Pulmonary:      Effort: Pulmonary effort is normal.      Breath sounds: Normal breath sounds.   Abdominal:      General: Bowel sounds are normal.      Palpations: Abdomen is soft.   Musculoskeletal:         General: Normal range of motion.      Cervical back: Normal range of motion and neck supple.      Right lower leg: No edema.      Left lower leg: No edema.   Skin:     General: Skin is warm and dry.   Neurological:      General: No focal deficit present.      Mental Status: He is alert and oriented to person, place, and time.            Significant Labs:  CBC:  Recent Labs   Lab 09/04/23 0315 09/05/23 0319 09/06/23 0429   WBC 8.64 7.90 9.20   RBC 4.00* 3.86* 3.88*   HGB 10.8* 10.5* 10.2*   HCT 34.2* 34.0* 34.4*    298 310   MCV 86 88 89   MCH 27.0 27.2 26.3*   MCHC 31.6* 30.9* 29.7*       BNP:  Recent Labs   Lab 08/31/23  1046   *       CMP:  Recent Labs   Lab 09/04/23 0315 09/04/23  1534 09/05/23 0319 09/06/23 0429   *  --  264* 164*   CALCIUM 8.7  --  8.4* 9.0   ALBUMIN 2.6*  --  2.4* 2.7*   PROT 7.3  --  6.7 7.1   *  --  135* 140   K 3.0* 3.2* 4.0 4.4   CO2 26  --  26 25     --  100 105   BUN 7  --  4* 6   CREATININE 1.0  --  1.1 1.2   ALKPHOS 100  --  94 98   ALT <5*  --  <5* <5*   AST 16   --  17 19   BILITOT 0.8  --  0.6 0.4        Coagulation:   Recent Labs   Lab 09/04/23  0315 09/05/23  0319 09/05/23  0436 09/05/23  1306 09/05/23  1908 09/06/23 0429   INR 1.3* 1.4*  --   --   --  1.3*   APTT 44.4*  44.4*  --    < > 47.8* 39.6* 34.0*    < > = values in this interval not displayed.       LDH:  Recent Labs   Lab 09/04/23 0315 09/05/23 0319 09/06/23 0429   * 226 273*       Microbiology:  Microbiology Results (last 7 days)       ** No results found for the last 168 hours. **            BMP:   Recent Labs   Lab 09/06/23 0429   *      K 4.4      CO2 25   BUN 6   CREATININE 1.2   CALCIUM 9.0   MG 1.8       I have reviewed all pertinent labs within the past 24 hours.    Estimated Creatinine Clearance: 99.8 mL/min (based on SCr of 1.2 mg/dL).    Diagnostic Results:  I have reviewed all pertinent imaging results/findings within the past 24 hours.    Assessment and Plan:     Mr. 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 He was reportedly not taking his coumadin or keppra for the past week due to running out. As per chart review, patient started to experience symptoms around 1am when he went to sleep. Symptoms worsened and EMS was called around 3:30am. Upon admission patient underwent a CT head showed an acute to subacute infarct with possible petechial hemorrhage. Labs were significant for INR of 1.2. Patient was loaded with Keppra and [place] to [facility] [place].      Upon arrival, patient was found to be hemodynamically stable w/ MAPs of 106. However, he was noted to be not alert or oriented and extremely lethargic. Patient was noted to have GCS 10-11 with minimally reactive pupils. Patient  underwent stat CT brain which showed no acute changes since prior CT, and transferred to the ICU for closer monitoring. Once in the ICU patient was intubated for airway protection.      Of note, patient was most recently hospitalized from 7/22/2023-8/8/2023 for sepsis/covod. Found to have loculated pleural effussion requiring chest tube placement and three doses of lytics. Effussion improved and patient was discharged with 6 week course of Ancef (to end on 9/14).        Home Medications:    Aspirin 81mg daily   Buspirone 10mg bid   Furosemide 80mg daily   Insulin detemir 25 units bid   Insulin aspart 14 units w/ meals   Milrinone 0.25   Mirtazapine 15mg nightly   Valsartan 40mg bi   Warfarin      Cardiac Imaging:    Echo (7/31/2023): HM3 at 5100. AV does not open. IV septum midline. EF 10-15%. LV severely dilated. Normal wall thickness. Severe global hypokinesis. RV not well visualized due to poor acoustic window. Biatrial enlargment. Mod TR.        * Embolic stroke involving left middle cerebral artery  - CTH with L parietal hypodensity with subtle areas of hyperdensity concerning for acute/subacute infarct with petechial hemorrhage, CTA negative for LVO or critical stenosis. Determined not a candidate for acute interventions with thrombolytics/thrombectomy.  - continue aspirin and statin.   -On heparin and Coumadin   - PT, OT, Speech consulted.     Bacteremia due to Staphylococcus  -H/O chronic MSSA DLES infection for which he is on Doxycycline at home  -Blood cxs here +ve for MSSA through 7/28. Repeated 7/29 with NGTD. Repeated again 7/30 NGTD  -PICC line replaced 8/1  -ECHO done 7/31 and no vegetations appreciated   -CT 8/1 with loculated fluid along Left lateral hear border, thoracic surgery consulted, not a candidate for VATS. Chest tube placed with IR 8/3, pleural fluid cultures positive for GPC  -Continue Ancef 8g IV q 24 hours for total of 6 weeks with end date 9/14 (6 weeks after chest tube placement).      Seizure-like activity  - loaded with keppra in ED prior to arrival.   - EEG unremarkable.  Will continue keppra 1000mg bid as per Neuro recs   -no seizures noted overnight     LVAD (left ventricular assist device) present  -HeartMate 3 Implanted on 6/29/2022 as DT with RV failure on milrinone at 0.25 mcg/kg/min.  -Coumadin held intially due to concern for petechial hemorrhage. Repeat CT stable so started on heparin bridge.Coumadin resumed. Goal INR 2.0-3.0. INR subtherapeutic today at 1.3. Increasing dose to 6mg   -LDH is stable.  Will continue to monitor daily  -Speed set at 5100, LSL 4700 rpm  -No Low flow alarms noted        Procedure: Device Interrogation Including analysis of device parameters  Current Settings: Ventricular Assist Device  Review of device function is stable        9/6/2023     8:00 AM 9/6/2023     4:32 AM 9/6/2023    12:45 AM 9/6/2023    12:00 AM 9/5/2023    11:00 PM 9/5/2023    10:00 PM 9/5/2023     9:00 PM   TXP LVAD INTERROGATIONS   Type HeartMate3 HeartMate3 HeartMate3 HeartMate3 HeartMate3 HeartMate3 HeartMate3   Flow 4 4 4.1 4 4.1 4.1 4   Speed 5100 5100 5100 5100 5100 5100 5100   PI 4.7 4.9 4.6 4.5 4.7 4.7 4.5   Power (Serna) 3.6 3.7 3.6 3.5 3.6 3.6 3.6   LSL 4700 4700 4700 4700 4700 4700 4700   Pulsatility No Pulse No Pulse No Pulse Intermittent pulse Intermittent pulse Intermittent pulse Intermittent pulse       Type 2 diabetes mellitus with hyperglycemia  - management as per endocrinology.     Acute combined systolic and diastolic congestive heart failure  -NICM s/p LVAD and on Milrinone @ 0.25   -Echo on (9/5/23): HM3 at 5100.The aortic valve does not open. septum is at midline. LV severely dilated. severely reduced systolic function with EF 10 -15%. There is normal diastolic function. RV Systolic function is normal. Mild MR, Severe TR  - Home GDMT: valsartan 40mg bid  - Home diuretic regimen: Lasix 80mg daily. Resuming today   - Ionotropes: continue home milrinone 0.25.   -  Strict I&Os and daily weights.   - Maintain K>4 and Mg>2        Marco Rothman NP  Heart Transplant  [facility] - [facility] Step[facility]

## 2023-09-06 NOTE — NURSING
LVAD dressing change completed using sterile technique with daily LVAD kit. DLES is a 1 with no drainage noted on the drain sponge. Tolerated without any complication. Driveline remains intact, no redness, or tenderness noted.

## 2023-09-06 NOTE — PROGRESS NOTES
[facility] - Cardiology Stepdown  Cardiothoracic Surgery  Evaluation and Management/VAD interrogation       Patient Name: Kevan Queen  MRN: [id number]  Admission Date: 8/31/2023  Hospital Length of Stay: 6 days  Code Status: Full Code   Attending Physician: Marlo Marques MD   Referring Provider: Rodger Yadav IV, MD  Principal Problem:Embolic stroke involving left middle cerebral artery  Subjective:     Post-Op Info:  * No surgery found *         Subjective:     Post-Op Info:  * No surgery found *          Reason for Visit:  Patient is seen in follow up management of LVAD/L MCA Stroke    Interval History: Remains on coumadin, doing well. INR subtherapeutic today    Medications:  Continuous Infusions:   heparin (porcine) in D5W 14 Units/kg/hr (09/06/23 0657)    milrinone 20mg/100ml D5W (200mcg/ml) 0.25 mcg/kg/min (09/06/23 0805)     Scheduled Meds:   aspirin  81 mg Oral Daily    atorvastatin  40 mg Oral Daily    busPIRone  10 mg Oral BID    ceFAZolin (ANCEF) 8 g in dextrose 5 % (D5W) 500 mL CONTINUOUS INFUSION  8 g Intravenous Q24H    furosemide  80 mg Oral Daily    insulin detemir U-100  6 Units Subcutaneous BID    levETIRAcetam  1,000 mg Oral BID    mirtazapine  15 mg Oral Nightly    pantoprazole  40 mg Oral Daily    polyethylene glycol  17 g Oral Daily    senna  8.6 mg Oral Daily    valsartan  40 mg Oral BID    warfarin  6 mg Oral Daily     PRN Meds:sodium chloride 0.9%, acetaminophen, dextrose 10%, dextrose 10%, glucagon (human recombinant), glucose, glucose, insulin aspart U-100     Objective:     Vital Signs (Most Recent):  Temp: 96.3 °F (35.7 °C) (09/06/23 0724)  Pulse: 96 (09/06/23 1200)  Resp: 20 (09/06/23 0724)  BP: (!) 82/0 (09/06/23 1146)  SpO2: 99 % (09/06/23 0724) Vital Signs (24h Range):  Temp:  [96.3 °F (35.7 °C)-98.8 °F (37.1 °C)] 96.3 °F (35.7 °C)  Pulse:  [] 96  Resp:  [18-52] 20  SpO2:  [96 %-100 %] 99 %  BP: ()/(0-73) 82/0       Intake/Output Summary (Last 24 hours)  at 9/6/2023 1420  Last data filed at 9/6/2023 0924  Gross per 24 hour   Intake 689.76 ml   Output 925 ml   Net -235.24 ml       Physical Exam  HENT:      Head: Normocephalic.   Eyes:      Extraocular Movements: Extraocular movements intact.   Cardiovascular:      Rate and Rhythm: Normal rate and regular rhythm.      Comments: LVAD hum is smooth  Pulmonary:      Effort: Pulmonary effort is normal.      Breath sounds: Normal breath sounds.   Abdominal:      General: Abdomen is flat.      Palpations: Abdomen is soft.   Skin:     General: Skin is warm and dry.   Neurological:      General: No focal deficit present.         Significant Labs:  BMP:   Recent Labs   Lab 09/06/23 0429   *      K 4.4      CO2 25   BUN 6   CREATININE 1.2   CALCIUM 9.0   MG 1.8     CBC:   Recent Labs   Lab 09/06/23 0429   WBC 9.20   RBC 3.88*   HGB 10.2*   HCT 34.4*      MCV 89   MCH 26.3*   MCHC 29.7*     CMP:   Recent Labs   Lab 09/06/23 0429   *   CALCIUM 9.0   ALBUMIN 2.7*   PROT 7.1      K 4.4   CO2 25      BUN 6   CREATININE 1.2   ALKPHOS 98   ALT <5*   AST 19   BILITOT 0.4     Coagulation:   Recent Labs   Lab 09/06/23 0429 09/06/23  1323   INR 1.3*  --    APTT 34.0* 43.0*       Significant Diagnostics:  I have reviewed and interpreted all pertinent imaging results/findings within the past 24 hours.    Procedure: Device Interrogation Including analysis of device parameters  Current Settings: Ventricular Assist Device  Review of device function is stable      9/6/2023    11:59 AM 9/6/2023     8:00 AM 9/6/2023     4:32 AM 9/6/2023    12:45 AM 9/6/2023    12:00 AM 9/5/2023    11:00 PM 9/5/2023    10:00 PM   TXP LVAD INTERROGATIONS   Type HeartMate3 HeartMate3 HeartMate3 HeartMate3 HeartMate3 HeartMate3 HeartMate3   Flow 4 4 4 4.1 4 4.1 4.1   Speed 5100 5100 5100 5100 5100 5100 5100   PI 5.7 4.7 4.9 4.6 4.5 4.7 4.7   Power (Serna) 3.6 3.6 3.7 3.6 3.5 3.6 3.6   LSL 4700 4700 4700 4700 4700 4700  4700   Pulsatility No Pulse No Pulse No Pulse No Pulse Intermittent pulse Intermittent pulse Intermittent pulse         Assessment/Plan:     LVAD (left ventricular assist device) present  - Admitted for Left MCA stroke  - Implant Date: 6/29/2022 with Dr. Paige  - Speed: 5100  - Low Flow Events: None overnight  - Interval History was obtained from team member during rounding today.  - VAD/ANABELLE teaching was performed with patient.  - Mobilization/Physical Therapy is ongoing.  - Anticoagulation: Coumadin/Heparin  - INR: 1.4  - Urine Output: 1025  - BUN: 6   - Creat: 1.2  - LDH: 273  - Awake and alert in bed during assessment    More than 50 percent of the care dominated counseling and coordinating care with different team members. The VAD was interrogated and no significant findings were found in the history. All these findings are documented in the note above.        Vandana Coleman, LUCÍA  Cardiothoracic Surgery  [facility] - Cardiology Stepdown

## 2023-09-06 NOTE — PLAN OF CARE
Pt free of falls and injury. Pt c/o mild right UA pain and PRN tylenol given. Pt AAOx4. Fall precautions remain in place. LVAD hum present and smooth. VAD numbers and dopplers WDL. DLES dressing CDI. Lab drawn. Plan of care reviewed with pt. Will continue to monitor pt.

## 2023-09-06 NOTE — ASSESSMENT & PLAN NOTE
- CTH with L parietal hypodensity with subtle areas of hyperdensity concerning for acute/subacute infarct with petechial hemorrhage, CTA negative for LVO or critical stenosis. Determined not a candidate for acute interventions with thrombolytics/thrombectomy.  - continue aspirin and statin.   -On heparin and Coumadin   - PT, OT, Speech consulted.

## 2023-09-06 NOTE — SUBJECTIVE & OBJECTIVE
Subjective:     Post-Op Info:  * No surgery found *          Reason for Visit:  Patient is seen in follow up management of LVAD/L MCA Stroke    Interval History: Remains on coumadin, doing well. INR subtherapeutic today    Medications:  Continuous Infusions:   heparin (porcine) in D5W 14 Units/kg/hr (09/06/23 0657)    milrinone 20mg/100ml D5W (200mcg/ml) 0.25 mcg/kg/min (09/06/23 0805)     Scheduled Meds:   aspirin  81 mg Oral Daily    atorvastatin  40 mg Oral Daily    busPIRone  10 mg Oral BID    ceFAZolin (ANCEF) 8 g in dextrose 5 % (D5W) 500 mL CONTINUOUS INFUSION  8 g Intravenous Q24H    furosemide  80 mg Oral Daily    insulin detemir U-100  6 Units Subcutaneous BID    levETIRAcetam  1,000 mg Oral BID    mirtazapine  15 mg Oral Nightly    pantoprazole  40 mg Oral Daily    polyethylene glycol  17 g Oral Daily    senna  8.6 mg Oral Daily    valsartan  40 mg Oral BID    warfarin  6 mg Oral Daily     PRN Meds:sodium chloride 0.9%, acetaminophen, dextrose 10%, dextrose 10%, glucagon (human recombinant), glucose, glucose, insulin aspart U-100     Objective:     Vital Signs (Most Recent):  Temp: 96.3 °F (35.7 °C) (09/06/23 0724)  Pulse: 96 (09/06/23 1200)  Resp: 20 (09/06/23 0724)  BP: (!) 82/0 (09/06/23 1146)  SpO2: 99 % (09/06/23 0724) Vital Signs (24h Range):  Temp:  [96.3 °F (35.7 °C)-98.8 °F (37.1 °C)] 96.3 °F (35.7 °C)  Pulse:  [] 96  Resp:  [18-52] 20  SpO2:  [96 %-100 %] 99 %  BP: ()/(0-73) 82/0       Intake/Output Summary (Last 24 hours) at 9/6/2023 1420  Last data filed at 9/6/2023 0924  Gross per 24 hour   Intake 689.76 ml   Output 925 ml   Net -235.24 ml       Physical Exam  HENT:      Head: Normocephalic.   Eyes:      Extraocular Movements: Extraocular movements intact.   Cardiovascular:      Rate and Rhythm: Normal rate and regular rhythm.      Comments: LVAD hum is smooth  Pulmonary:      Effort: Pulmonary effort is normal.      Breath sounds: Normal breath sounds.   Abdominal:      General:  Abdomen is flat.      Palpations: Abdomen is soft.   Skin:     General: Skin is warm and dry.   Neurological:      General: No focal deficit present.         Significant Labs:  BMP:   Recent Labs   Lab 09/06/23 0429   *      K 4.4      CO2 25   BUN 6   CREATININE 1.2   CALCIUM 9.0   MG 1.8     CBC:   Recent Labs   Lab 09/06/23 0429   WBC 9.20   RBC 3.88*   HGB 10.2*   HCT 34.4*      MCV 89   MCH 26.3*   MCHC 29.7*     CMP:   Recent Labs   Lab 09/06/23 0429   *   CALCIUM 9.0   ALBUMIN 2.7*   PROT 7.1      K 4.4   CO2 25      BUN 6   CREATININE 1.2   ALKPHOS 98   ALT <5*   AST 19   BILITOT 0.4     Coagulation:   Recent Labs   Lab 09/06/23 0429 09/06/23  1323   INR 1.3*  --    APTT 34.0* 43.0*       Significant Diagnostics:  I have reviewed and interpreted all pertinent imaging results/findings within the past 24 hours.    Procedure: Device Interrogation Including analysis of device parameters  Current Settings: Ventricular Assist Device  Review of device function is stable      9/6/2023    11:59 AM 9/6/2023     8:00 AM 9/6/2023     4:32 AM 9/6/2023    12:45 AM 9/6/2023    12:00 AM 9/5/2023    11:00 PM 9/5/2023    10:00 PM   TXP LVAD INTERROGATIONS   Type HeartMate3 HeartMate3 HeartMate3 HeartMate3 HeartMate3 HeartMate3 HeartMate3   Flow 4 4 4 4.1 4 4.1 4.1   Speed 5100 5100 5100 5100 5100 5100 5100   PI 5.7 4.7 4.9 4.6 4.5 4.7 4.7   Power (Serna) 3.6 3.6 3.7 3.6 3.5 3.6 3.6   LSL 4700 4700 4700 4700 4700 4700 4700   Pulsatility No Pulse No Pulse No Pulse No Pulse Intermittent pulse Intermittent pulse Intermittent pulse

## 2023-09-06 NOTE — ASSESSMENT & PLAN NOTE
- loaded with keppra in ED prior to arrival.   - EEG unremarkable.  Will continue keppra 1000mg bid as per Neuro recs   -no seizures noted overnight

## 2023-09-06 NOTE — ASSESSMENT & PLAN NOTE
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  -Coumadin held intially due to concern for petechial hemorrhage. Repeat CT stable so started on heparin bridge.Coumadin resumed. Goal INR 2.0-3.0. INR subtherapeutic today at 1.3. Increasing dose to 6mg   -LDH is stable.  Will continue to monitor daily  -Speed set at 5100, LSL 4700 rpm  -No Low flow alarms noted        Procedure: Device Interrogation Including analysis of device parameters  Current Settings: Ventricular Assist Device  Review of device function is stable        9/6/2023     8:00 AM 9/6/2023     4:32 AM 9/6/2023    12:45 AM 9/6/2023    12:00 AM 9/5/2023    11:00 PM 9/5/2023    10:00 PM 9/5/2023     9:00 PM   TXP LVAD INTERROGATIONS   Type HeartMate3 HeartMate3 HeartMate3 HeartMate3 HeartMate3 HeartMate3 HeartMate3   Flow 4 4 4.1 4 4.1 4.1 4   Speed 5100 5100 5100 5100 5100 5100 5100   PI 4.7 4.9 4.6 4.5 4.7 4.7 4.5   Power (Serna) 3.6 3.7 3.6 3.5 3.6 3.6 3.6   LSL 4700 4700 4700 4700 4700 4700 4700   Pulsatility No Pulse No Pulse No Pulse Intermittent pulse Intermittent pulse Intermittent pulse Intermittent pulse

## 2023-09-07 LAB
ALBUMIN SERPL BCP-MCNC: 2.7 G/DL (ref 3.5–5.2)
ALP SERPL-CCNC: 100 U/L (ref 55–135)
ALT SERPL W/O P-5'-P-CCNC: <5 U/L (ref 10–44)
ANION GAP SERPL CALC-SCNC: 11 MMOL/L (ref 8–16)
APTT PPP: 43.6 SEC (ref 21–32)
AST SERPL-CCNC: 19 U/L (ref 10–40)
BASOPHILS # BLD AUTO: 0.05 K/UL (ref 0–0.2)
BASOPHILS NFR BLD: 0.6 % (ref 0–1.9)
BILIRUB SERPL-MCNC: 0.4 MG/DL (ref 0.1–1)
BUN SERPL-MCNC: 11 MG/DL (ref 6–20)
CALCIUM SERPL-MCNC: 8.8 MG/DL (ref 8.7–10.5)
CHLORIDE SERPL-SCNC: 103 MMOL/L (ref 95–110)
CO2 SERPL-SCNC: 24 MMOL/L (ref 23–29)
CREAT SERPL-MCNC: 1.1 MG/DL (ref 0.5–1.4)
DIFFERENTIAL METHOD: ABNORMAL
EOSINOPHIL # BLD AUTO: 0.2 K/UL (ref 0–0.5)
EOSINOPHIL NFR BLD: 2.5 % (ref 0–8)
ERYTHROCYTE [DISTWIDTH] IN BLOOD BY AUTOMATED COUNT: 19 % (ref 11.5–14.5)
EST. GFR  (NO RACE VARIABLE): >60 ML/MIN/1.73 M^2
GLUCOSE SERPL-MCNC: 180 MG/DL (ref 70–110)
HCT VFR BLD AUTO: 34.6 % (ref 40–54)
HGB BLD-MCNC: 10.4 G/DL (ref 14–18)
IMM GRANULOCYTES # BLD AUTO: 0.02 K/UL (ref 0–0.04)
IMM GRANULOCYTES NFR BLD AUTO: 0.2 % (ref 0–0.5)
INR PPP: 1.3 (ref 0.8–1.2)
LDH SERPL L TO P-CCNC: 243 U/L (ref 110–260)
LYMPHOCYTES # BLD AUTO: 1.8 K/UL (ref 1–4.8)
LYMPHOCYTES NFR BLD: 22.1 % (ref 18–48)
MAGNESIUM SERPL-MCNC: 1.5 MG/DL (ref 1.6–2.6)
MCH RBC QN AUTO: 26.9 PG (ref 27–31)
MCHC RBC AUTO-ENTMCNC: 30.1 G/DL (ref 32–36)
MCV RBC AUTO: 89 FL (ref 82–98)
MONOCYTES # BLD AUTO: 0.8 K/UL (ref 0.3–1)
MONOCYTES NFR BLD: 9.8 % (ref 4–15)
NEUTROPHILS # BLD AUTO: 5.2 K/UL (ref 1.8–7.7)
NEUTROPHILS NFR BLD: 64.8 % (ref 38–73)
NRBC BLD-RTO: 0 /100 WBC
PHOSPHATE SERPL-MCNC: 3.3 MG/DL (ref 2.7–4.5)
PLATELET # BLD AUTO: 321 K/UL (ref 150–450)
PMV BLD AUTO: 10.1 FL (ref 9.2–12.9)
POCT GLUCOSE: 180 MG/DL (ref 70–110)
POCT GLUCOSE: 187 MG/DL (ref 70–110)
POCT GLUCOSE: 202 MG/DL (ref 70–110)
POCT GLUCOSE: 206 MG/DL (ref 70–110)
POCT GLUCOSE: 249 MG/DL (ref 70–110)
POTASSIUM SERPL-SCNC: 4.2 MMOL/L (ref 3.5–5.1)
PROT SERPL-MCNC: 7.1 G/DL (ref 6–8.4)
PROTHROMBIN TIME: 13.9 SEC (ref 9–12.5)
RBC # BLD AUTO: 3.87 M/UL (ref 4.6–6.2)
SODIUM SERPL-SCNC: 138 MMOL/L (ref 136–145)
WBC # BLD AUTO: 8.05 K/UL (ref 3.9–12.7)

## 2023-09-07 PROCEDURE — 25000003 PHARM REV CODE 250: Performed by: PHYSICIAN ASSISTANT

## 2023-09-07 PROCEDURE — 93750 PR INTERROGATE VENT ASSIST DEV, IN PERSON, W PHYSICIAN ANALYSIS: ICD-10-PCS | Mod: ,,, | Performed by: INTERNAL MEDICINE

## 2023-09-07 PROCEDURE — 25000003 PHARM REV CODE 250: Performed by: INTERNAL MEDICINE

## 2023-09-07 PROCEDURE — 84100 ASSAY OF PHOSPHORUS: CPT | Performed by: INTERNAL MEDICINE

## 2023-09-07 PROCEDURE — 63600175 PHARM REV CODE 636 W HCPCS: Performed by: PHYSICIAN ASSISTANT

## 2023-09-07 PROCEDURE — 20600001 HC STEP DOWN PRIVATE ROOM

## 2023-09-07 PROCEDURE — 85025 COMPLETE CBC W/AUTO DIFF WBC: CPT | Performed by: INTERNAL MEDICINE

## 2023-09-07 PROCEDURE — 83735 ASSAY OF MAGNESIUM: CPT | Performed by: INTERNAL MEDICINE

## 2023-09-07 PROCEDURE — 85730 THROMBOPLASTIN TIME PARTIAL: CPT | Performed by: INTERNAL MEDICINE

## 2023-09-07 PROCEDURE — 99233 PR SUBSEQUENT HOSPITAL CARE,LEVL III: ICD-10-PCS | Mod: ,,, | Performed by: REGISTERED NURSE

## 2023-09-07 PROCEDURE — 85610 PROTHROMBIN TIME: CPT | Performed by: INTERNAL MEDICINE

## 2023-09-07 PROCEDURE — 93750 INTERROGATION VAD IN PERSON: CPT | Mod: ,,, | Performed by: INTERNAL MEDICINE

## 2023-09-07 PROCEDURE — 25000003 PHARM REV CODE 250: Performed by: REGISTERED NURSE

## 2023-09-07 PROCEDURE — 99233 SBSQ HOSP IP/OBS HIGH 50: CPT | Mod: ,,, | Performed by: REGISTERED NURSE

## 2023-09-07 PROCEDURE — 63600175 PHARM REV CODE 636 W HCPCS: Performed by: REGISTERED NURSE

## 2023-09-07 PROCEDURE — 80053 COMPREHEN METABOLIC PANEL: CPT | Performed by: INTERNAL MEDICINE

## 2023-09-07 PROCEDURE — 83615 LACTATE (LD) (LDH) ENZYME: CPT | Performed by: PHYSICIAN ASSISTANT

## 2023-09-07 PROCEDURE — 25000003 PHARM REV CODE 250: Performed by: STUDENT IN AN ORGANIZED HEALTH CARE EDUCATION/TRAINING PROGRAM

## 2023-09-07 PROCEDURE — 27000248 HC VAD-ADDITIONAL DAY

## 2023-09-07 RX ORDER — MAGNESIUM SULFATE HEPTAHYDRATE 40 MG/ML
2 INJECTION, SOLUTION INTRAVENOUS ONCE
Status: COMPLETED | OUTPATIENT
Start: 2023-09-07 | End: 2023-09-07

## 2023-09-07 RX ADMIN — BUSPIRONE HYDROCHLORIDE 10 MG: 10 TABLET ORAL at 08:09

## 2023-09-07 RX ADMIN — BUSPIRONE HYDROCHLORIDE 10 MG: 10 TABLET ORAL at 09:09

## 2023-09-07 RX ADMIN — MILRINONE LACTATE IN DEXTROSE 0.25 MCG/KG/MIN: 200 INJECTION, SOLUTION INTRAVENOUS at 11:09

## 2023-09-07 RX ADMIN — MILRINONE LACTATE IN DEXTROSE 0.25 MCG/KG/MIN: 200 INJECTION, SOLUTION INTRAVENOUS at 01:09

## 2023-09-07 RX ADMIN — SENNOSIDES 8.6 MG: 8.6 TABLET, FILM COATED ORAL at 08:09

## 2023-09-07 RX ADMIN — LEVETIRACETAM 1000 MG: 500 TABLET, FILM COATED ORAL at 08:09

## 2023-09-07 RX ADMIN — Medication 200 MG: at 10:09

## 2023-09-07 RX ADMIN — FUROSEMIDE 80 MG: 80 TABLET ORAL at 08:09

## 2023-09-07 RX ADMIN — LEVETIRACETAM 1000 MG: 500 TABLET, FILM COATED ORAL at 09:09

## 2023-09-07 RX ADMIN — ACETAMINOPHEN 650 MG: 325 TABLET ORAL at 09:09

## 2023-09-07 RX ADMIN — HEPARIN SODIUM 14 UNITS/KG/HR: 10000 INJECTION, SOLUTION INTRAVENOUS at 04:09

## 2023-09-07 RX ADMIN — ATORVASTATIN CALCIUM 40 MG: 20 TABLET, FILM COATED ORAL at 08:09

## 2023-09-07 RX ADMIN — INSULIN ASPART 2 UNITS: 100 INJECTION, SOLUTION INTRAVENOUS; SUBCUTANEOUS at 05:09

## 2023-09-07 RX ADMIN — INSULIN ASPART 4 UNITS: 100 INJECTION, SOLUTION INTRAVENOUS; SUBCUTANEOUS at 08:09

## 2023-09-07 RX ADMIN — ACETAMINOPHEN 650 MG: 325 TABLET ORAL at 01:09

## 2023-09-07 RX ADMIN — INSULIN ASPART 4 UNITS: 100 INJECTION, SOLUTION INTRAVENOUS; SUBCUTANEOUS at 12:09

## 2023-09-07 RX ADMIN — HEPARIN SODIUM 14 UNITS/KG/HR: 10000 INJECTION, SOLUTION INTRAVENOUS at 10:09

## 2023-09-07 RX ADMIN — VALSARTAN 40 MG: 40 TABLET, FILM COATED ORAL at 09:09

## 2023-09-07 RX ADMIN — INSULIN ASPART 1 UNITS: 100 INJECTION, SOLUTION INTRAVENOUS; SUBCUTANEOUS at 09:09

## 2023-09-07 RX ADMIN — PANTOPRAZOLE SODIUM 40 MG: 40 TABLET, DELAYED RELEASE ORAL at 08:09

## 2023-09-07 RX ADMIN — MIRTAZAPINE 15 MG: 15 TABLET, FILM COATED ORAL at 09:09

## 2023-09-07 RX ADMIN — MAGNESIUM SULFATE HEPTAHYDRATE 2 G: 40 INJECTION, SOLUTION INTRAVENOUS at 10:09

## 2023-09-07 RX ADMIN — INSULIN DETEMIR 6 UNITS: 100 INJECTION, SOLUTION SUBCUTANEOUS at 09:09

## 2023-09-07 RX ADMIN — WARFARIN SODIUM 6 MG: 3 TABLET ORAL at 05:09

## 2023-09-07 RX ADMIN — CEFAZOLIN 8 G: 10 INJECTION, POWDER, FOR SOLUTION INTRAVENOUS at 01:09

## 2023-09-07 RX ADMIN — INSULIN DETEMIR 6 UNITS: 100 INJECTION, SOLUTION SUBCUTANEOUS at 08:09

## 2023-09-07 RX ADMIN — ASPIRIN 81 MG 81 MG: 81 TABLET ORAL at 08:09

## 2023-09-07 RX ADMIN — VALSARTAN 40 MG: 40 TABLET, FILM COATED ORAL at 08:09

## 2023-09-07 NOTE — PROGRESS NOTES
[facility] - Cardiology Stepdown  Heart Transplant  Progress Note    Patient Name: Kevan Queen  MRN: [id number]  Admission Date: 8/31/2023  Hospital Length of Stay: 7 days  Attending Physician: Marlo Marques MD  Primary Care Provider: Rosio Armendariz FNP  Principal Problem:Embolic stroke involving left middle cerebral artery    Subjective:     Interval History: No acute events overnight. He is net -1L today. INR remains subtherapeutic at 1.3 again today. Plans to discharge once INR reaches 1.8. Remains hemodynamically stable on home Milrinone dose with no low VAD alarms.       Continuous Infusions:   heparin (porcine) in D5W 14 Units/kg/hr (09/07/23 0452)    milrinone 20mg/100ml D5W (200mcg/ml) 0.25 mcg/kg/min (09/06/23 2256)     Scheduled Meds:   aspirin  81 mg Oral Daily    atorvastatin  40 mg Oral Daily    busPIRone  10 mg Oral BID    ceFAZolin (ANCEF) 8 g in dextrose 5 % (D5W) 500 mL CONTINUOUS INFUSION  8 g Intravenous Q24H    furosemide  80 mg Oral Daily    insulin detemir U-100  6 Units Subcutaneous BID    levETIRAcetam  1,000 mg Oral BID    magnesium oxide  200 mg Oral Daily    magnesium sulfate IVPB  2 g Intravenous Once    mirtazapine  15 mg Oral Nightly    pantoprazole  40 mg Oral Daily    polyethylene glycol  17 g Oral Daily    senna  8.6 mg Oral Daily    valsartan  40 mg Oral BID    warfarin  6 mg Oral Daily     PRN Meds:sodium chloride 0.9%, acetaminophen, dextrose 10%, dextrose 10%, glucagon (human recombinant), glucose, glucose, insulin aspart U-100    Review of patient's allergies indicates:   Allergen Reactions    Bumex [bumetanide] Hives    Torsemide Hives     Objective:     Vital Signs (Most Recent):  Temp: 97.8 °F (36.6 °C) (09/07/23 0733)  Pulse: 86 (09/07/23 0733)  Resp: 18 (09/07/23 0733)  BP: (!) 64/0 (09/07/23 0733)  SpO2: 98 % (09/07/23 0733) Vital Signs (24h Range):  Temp:  [97.2 °F (36.2 °C)-97.9 °F (36.6 °C)] 97.8 °F (36.6 °C)  Pulse:  [] 86  Resp:  [18-20]  18  SpO2:  [96 %-100 %] 98 %  BP: ()/(0-64) 64/0     Patient Vitals for the past 72 hrs (Last 3 readings):   Weight   09/06/23 0920 89.6 kg (197 lb 8.5 oz)   09/05/23 1010 89.8 kg (198 lb)       Body mass index is 24.69 kg/m².      Intake/Output Summary (Last 24 hours) at 9/7/2023 0958  Last data filed at 9/7/2023 0924  Gross per 24 hour   Intake 480 ml   Output 1960 ml   Net -1480 ml         Hemodynamic Parameters:       Telemetry: NSR        Physical Exam  Constitutional:       Appearance: Normal appearance.   HENT:      Head: Normocephalic and atraumatic.   Cardiovascular:      Rate and Rhythm: Normal rate and regular rhythm.      Pulses: Normal pulses.      Comments: Smooth VAD hum   Pulmonary:      Effort: Pulmonary effort is normal.      Breath sounds: Normal breath sounds.   Abdominal:      General: Bowel sounds are normal.      Palpations: Abdomen is soft.   Musculoskeletal:         General: Normal range of motion.      Cervical back: Normal range of motion and neck supple.      Right lower leg: No edema.      Left lower leg: No edema.   Skin:     General: Skin is warm and dry.   Neurological:      General: No focal deficit present.      Mental Status: He is alert and oriented to person, place, and time.            Significant Labs:  CBC:  Recent Labs   Lab 09/05/23 0319 09/06/23 0429 09/07/23 0448   WBC 7.90 9.20 8.05   RBC 3.86* 3.88* 3.87*   HGB 10.5* 10.2* 10.4*   HCT 34.0* 34.4* 34.6*    310 321   MCV 88 89 89   MCH 27.2 26.3* 26.9*   MCHC 30.9* 29.7* 30.1*       BNP:  Recent Labs   Lab 08/31/23  1046   *       CMP:  Recent Labs   Lab 09/05/23 0319 09/06/23 0429 09/07/23 0448   * 164* 180*   CALCIUM 8.4* 9.0 8.8   ALBUMIN 2.4* 2.7* 2.7*   PROT 6.7 7.1 7.1   * 140 138   K 4.0 4.4 4.2   CO2 26 25 24    105 103   BUN 4* 6 11   CREATININE 1.1 1.2 1.1   ALKPHOS 94 98 100   ALT <5* <5* <5*   AST 17 19 19   BILITOT 0.6 0.4 0.4        Coagulation:   Recent Labs   Lab  09/05/23  0319 09/05/23  0436 09/06/23  0429 09/06/23  1323 09/06/23  2118 09/07/23 0448   INR 1.4*  --  1.3*  --   --  1.3*   APTT  --    < > 34.0* 43.0* 44.1* 43.6*    < > = values in this interval not displayed.       LDH:  Recent Labs   Lab 09/05/23 0319 09/06/23 0429 09/07/23 0448    273* 243       Microbiology:  Microbiology Results (last 7 days)       ** No results found for the last 168 hours. **            BMP:   Recent Labs   Lab 09/07/23 0448   *      K 4.2      CO2 24   BUN 11   CREATININE 1.1   CALCIUM 8.8   MG 1.5*       I have reviewed all pertinent labs within the past 24 hours.    Estimated Creatinine Clearance: 108.8 mL/min (based on SCr of 1.1 mg/dL).    Diagnostic Results:  I have reviewed all pertinent imaging results/findings within the past 24 hours.    Assessment and Plan:     Mr. Kevan Thacker is a 37 y/o AAM w/ PMH of NICM (possible Familial w/ father having LVAD and subsequent transplant) s/p DT-HM3 (6/3/2022), HFrEF, chronic DLES, MSSA bacteremia c/b L empyema (maintained on ancef, maria isabel 9/14), seizures, and IDDM2 who initially presented to [facility] with aphasia and right sided weekness. He was reportedly not taking his coumadin or keppra for the past week due to running out. As per chart review, patient started to experience symptoms around 1am when he went to sleep. Symptoms worsened and EMS was called around 3:30am. Upon admission patient underwent a CT head showed an acute to subacute infarct with possible petechial hemorrhage. Labs were significant for INR of 1.2. Patient was loaded with Keppra and [facility] care.      Upon arrival, patient was found to be hemodynamically stable w/ MAPs of 106. However, he was noted to be not alert or oriented and extremely lethargic. Patient was noted to have GCS 10-11 with minimally reactive pupils. Patient underwent stat CT brain which showed no acute changes since prior CT, and  transferred to the ICU for closer monitoring. Once in the ICU patient was intubated for airway protection.      Of note, patient was most recently hospitalized from 7/22/2023-8/8/2023 for sepsis/covod. Found to have loculated pleural effussion requiring chest tube placement and three doses of lytics. Effussion improved and patient was discharged with 6 week course of Ancef (to end on 9/14).        Home Medications:    Aspirin 81mg daily   Buspirone 10mg bid   Furosemide 80mg daily   Insulin detemir 25 units bid   Insulin aspart 14 units w/ meals   Milrinone 0.25   Mirtazapine 15mg nightly   Valsartan 40mg bi   Warfarin      Cardiac Imaging:    Echo (7/31/2023): HM3 at 5100. AV does not open. IV septum midline. EF 10-15%. LV severely dilated. Normal wall thickness. Severe global hypokinesis. RV not well visualized due to poor acoustic window. Biatrial enlargment. Mod TR.        * Embolic stroke involving left middle cerebral artery  - CTH with L parietal hypodensity with subtle areas of hyperdensity concerning for acute/subacute infarct with petechial hemorrhage, CTA negative for LVO or critical stenosis. Determined not a candidate for acute interventions with thrombolytics/thrombectomy.  - continue aspirin and statin.   -On heparin and Coumadin   - PT, OT, Speech consulted  -Will continue outpatient speech therapy      Bacteremia due to Staphylococcus  -H/O chronic MSSA DLES infection for which he is on Doxycycline at home  -Blood cxs here +ve for MSSA through 7/28. Repeated 7/29 with NGTD. Repeated again 7/30 NGTD  -PICC line replaced 8/1  -ECHO done 7/31 and no vegetations appreciated   -CT 8/1 with loculated fluid along Left lateral hear border, thoracic surgery consulted, not a candidate for VATS. Chest tube placed with IR 8/3, pleural fluid cultures positive for GPC  -Continue Ancef 8g IV q 24 hours for total of 6 weeks with end date 9/14 (home health orders completed).     Seizure-like activity  - loaded with  keppra in ED prior to arrival.   - EEG unremarkable.  Will continue keppra 1000mg bid as per Neuro recs   -no seizures noted overnight     LVAD (left ventricular assist device) present  -HeartMate 3 Implanted on 6/29/2022 as DT with RV failure on milrinone at 0.25 mcg/kg/min.  -Coumadin held intially due to concern for petechial hemorrhage. Repeat CT stable so started on heparin bridge.Coumadin resumed. Goal INR 2.0-3.0. INR subtherapeutic today at 1.3. Trend daily.    -LDH is stable.  Will continue to monitor daily  -Speed set at 5100, LSL 4700 rpm  -No Low flow alarms noted        Procedure: Device Interrogation Including analysis of device parameters  Current Settings: Ventricular Assist Device  Review of device function is stable        9/7/2023     8:00 AM 9/7/2023     4:49 AM 9/7/2023    12:23 AM 9/6/2023     8:05 PM 9/6/2023     3:51 PM 9/6/2023    12:00 PM 9/6/2023     8:00 AM   TXP LVAD INTERROGATIONS   Type HeartMate3 HeartMate3 HeartMate3 HeartMate3 HeartMate3 HeartMate3 HeartMate3   Flow 3.7 3.8 4.1 4.3 4 4 4   Speed 5100 5100 5100 5100 5100 5100 5100   PI 6 5.5 4.1 3.6 4.5 5.7 4.7   Power (Serna) 3.9 3.6 3.6 3.7 3.8 33.6 3.6   LSL 4700 4700 4700 4700 4700 4700 4700   Pulsatility Intermittent pulse Pulse Pulse Pulse Intermittent pulse Intermittent pulse Intermittent pulse       Type 2 diabetes mellitus with hyperglycemia  - management as per endocrinology.     Acute combined systolic and diastolic congestive heart failure  -NICM s/p LVAD and on Milrinone @ 0.25   -Echo on (9/5/23): HM3 at 5100.The aortic valve does not open. septum is at midline. LV severely dilated. severely reduced systolic function with EF 10 -15%. There is normal diastolic function. RV Systolic function is normal. Mild MR, Severe TR  - Home GDMT: valsartan 40mg bid (continued)  - Home diuretic regimen: Lasix 80mg daily.  (continued)   - Ionotropes: continue home milrinone 0.25.   - Strict I&Os and daily weights.   - Maintain K>4 and  Mg>2  -Replaced Mag today         Marco Rothman, NP  Heart Transplant  [facility] - Cardiology Stepdown

## 2023-09-07 NOTE — PLAN OF CARE
Problem: Adult Inpatient Plan of Care  Goal: Plan of Care Review  Outcome: Ongoing, Progressing  Goal: Patient-Specific Goal (Individualized)  Outcome: Ongoing, Progressing  Goal: Absence of Hospital-Acquired Illness or Injury  Outcome: Ongoing, Progressing  Goal: Optimal Comfort and Wellbeing  Outcome: Ongoing, Progressing     Problem: Diabetes Comorbidity  Goal: Blood Glucose Level Within Targeted Range  Outcome: Ongoing, Progressing     Problem: Glycemic Control Impaired (Sepsis/Septic Shock)  Goal: Blood Glucose Level Within Desired Range  Outcome: Ongoing, Progressing     Problem: Infection Progression (Sepsis/Septic Shock)  Goal: Absence of Infection Signs and Symptoms  Outcome: Ongoing, Progressing     Problem: Nutrition Impaired (Sepsis/Septic Shock)  Goal: Optimal Nutrition Intake  Outcome: Ongoing, Progressing     Problem: Infection  Goal: Absence of Infection Signs and Symptoms  Outcome: Ongoing, Progressing     Problem: Skin Injury Risk Increased  Goal: Skin Health and Integrity  Outcome: Ongoing, Progressing     Problem: Adjustment to Illness (Stroke, Ischemic/Transient Ischemic Attack)  Goal: Optimal Coping  Outcome: Ongoing, Progressing     Problem: Cerebral Tissue Perfusion (Stroke, Ischemic/Transient Ischemic Attack)  Goal: Optimal Cerebral Tissue Perfusion  Outcome: Ongoing, Progressing     Problem: Cognitive Impairment (Stroke, Ischemic/Transient Ischemic Attack)  Goal: Optimal Cognitive Function  Outcome: Ongoing, Progressing     Problem: Communication Impairment (Stroke, Ischemic/Transient Ischemic Attack)  Goal: Improved Communication Skills  Outcome: Ongoing, Progressing     Problem: Functional Ability Impaired (Stroke, Ischemic/Transient Ischemic Attack)  Goal: Optimal Functional Ability  Outcome: Ongoing, Progressing     Problem: Respiratory Compromise (Stroke, Ischemic/Transient Ischemic Attack)  Goal: Effective Oxygenation and Ventilation  Outcome: Ongoing, Progressing     Problem:  Sensorimotor Impairment (Stroke, Ischemic/Transient Ischemic Attack)  Goal: Improved Sensorimotor Function  Outcome: Ongoing, Progressing     Problem: Urinary Elimination Impaired (Stroke, Ischemic/Transient Ischemic Attack)  Goal: Effective Urinary Elimination  Outcome: Ongoing, Progressing     Problem: Fall Injury Risk  Goal: Absence of Fall and Fall-Related Injury  Outcome: Ongoing, Progressing     Problem: Adjustment to Device (Ventricular Assist Device)  Goal: Optimal Adjustment to Device  Outcome: Ongoing, Progressing     Problem: Embolism (Ventricular Assist Device)  Goal: Absence of Embolism Signs and Symptoms  Outcome: Ongoing, Progressing     Problem: Hemodynamic Instability (Ventricular Assist Device)  Goal: Optimal Blood Flow  Outcome: Ongoing, Progressing     Problem: Infection (Ventricular Assist Device)  Goal: Absence of Infection Signs and Symptoms  Outcome: Ongoing, Progressing     Problem: Right-Sided Heart Compromise (Ventricular Assist Device)  Goal: Effective Right-Sided Heart Function  Outcome: Ongoing, Progressing  Plan of care discussed with patient.  Patient ambulating independently, fall precautions in place. LVAD DP and numbers WNL, smooth LVAD hum. AAOx4 and VSS. Urinal and call light within reach.  Patient has no complaints of pain. Discussed medications and care.  Patient has no questions at this time. Will continue to monitor.

## 2023-09-07 NOTE — NURSING
LVAD dressing change completed using sterile technique with daily LVAD kit. DLES is a 1with no drainage noted on the drain sponge. Tolerated without any complication. Driveline remains intact, no redness, or tenderness noted. Central line dressing also changed at this time.

## 2023-09-07 NOTE — ASSESSMENT & PLAN NOTE
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  -Coumadin held intially due to concern for petechial hemorrhage. Repeat CT stable so started on heparin bridge.Coumadin resumed. Goal INR 2.0-3.0. INR subtherapeutic today at 1.3. Trend daily.    -LDH is stable.  Will continue to monitor daily  -Speed set at 5100, LSL 4700 rpm  -No Low flow alarms noted        Procedure: Device Interrogation Including analysis of device parameters  Current Settings: Ventricular Assist Device  Review of device function is stable        9/7/2023     8:00 AM 9/7/2023     4:49 AM 9/7/2023    12:23 AM 9/6/2023     8:05 PM 9/6/2023     3:51 PM 9/6/2023    12:00 PM 9/6/2023     8:00 AM   TXP LVAD INTERROGATIONS   Type HeartMate3 HeartMate3 HeartMate3 HeartMate3 HeartMate3 HeartMate3 HeartMate3   Flow 3.7 3.8 4.1 4.3 4 4 4   Speed 5100 5100 5100 5100 5100 5100 5100   PI 6 5.5 4.1 3.6 4.5 5.7 4.7   Power (Serna) 3.9 3.6 3.6 3.7 3.8 33.6 3.6   LSL 4700 4700 4700 4700 4700 4700 4700   Pulsatility Intermittent pulse Pulse Pulse Pulse Intermittent pulse Intermittent pulse Intermittent pulse

## 2023-09-07 NOTE — ASSESSMENT & PLAN NOTE
-NICM s/p LVAD and on Milrinone @ 0.25   -Echo on (9/5/23): HM3 at 5100.The aortic valve does not open. septum is at midline. LV severely dilated. severely reduced systolic function with EF 10 -15%. There is normal diastolic function. RV Systolic function is normal. Mild MR, Severe TR  - Home GDMT: valsartan 40mg bid (continued)  - Home diuretic regimen: Lasix 80mg daily.  (continued)   - Ionotropes: continue home milrinone 0.25.   - Strict I&Os and daily weights.   - Maintain K>4 and Mg>2

## 2023-09-07 NOTE — PROGRESS NOTES
[facility] Step[facility]  Cardiothoracic Surgery  Evaluation and Management/VAD interrogation       Patient Name: Kevan Queen  MRN: [id number]  Admission Date: 8/31/2023  Hospital Length of Stay: 7 days  Code Status: Full Code   Attending Physician: Marlo Marques MD   Referring Provider: Rodger Yadav IV, MD  Principal Problem:Embolic stroke involving left middle cerebral artery    Subjective:     Post-Op Info:  * No surgery found *         Subjective:     Post-Op Info:  * No surgery found *          Reason for Visit:  Patient is seen in follow up management of LVAD Placement    Interval History: INR remains subtherapeutic, otherwise NAEON.       Medications:  Continuous Infusions:   heparin (porcine) in D5W 14 Units/kg/hr (09/07/23 0452)    milrinone 20mg/100ml D5W (200mcg/ml) 0.25 mcg/kg/min (09/06/23 2256)     Scheduled Meds:   aspirin  81 mg Oral Daily    atorvastatin  40 mg Oral Daily    busPIRone  10 mg Oral BID    ceFAZolin (ANCEF) 8 g in dextrose 5 % (D5W) 500 mL CONTINUOUS INFUSION  8 g Intravenous Q24H    furosemide  80 mg Oral Daily    insulin detemir U-100  6 Units Subcutaneous BID    levETIRAcetam  1,000 mg Oral BID    mirtazapine  15 mg Oral Nightly    pantoprazole  40 mg Oral Daily    polyethylene glycol  17 g Oral Daily    senna  8.6 mg Oral Daily    valsartan  40 mg Oral BID    warfarin  6 mg Oral Daily     PRN Meds:sodium chloride 0.9%, acetaminophen, dextrose 10%, dextrose 10%, glucagon (human recombinant), glucose, glucose, insulin aspart U-100     Objective:     Vital Signs (Most Recent):  Temp: 97.8 °F (36.6 °C) (09/07/23 0733)  Pulse: 86 (09/07/23 0733)  Resp: 18 (09/07/23 0733)  BP: (!) 64/0 (09/07/23 0733)  SpO2: 98 % (09/07/23 0733) Vital Signs (24h Range):  Temp:  [97.2 °F (36.2 °C)-97.9 °F (36.6 °C)] 97.8 °F (36.6 °C)  Pulse:  [] 86  Resp:  [18-20] 18  SpO2:  [96 %-100 %] 98 %  BP: ()/(0-64) 64/0       Intake/Output Summary (Last 24 hours) at 9/7/2023  0902  Last data filed at 9/6/2023 1953  Gross per 24 hour   Intake 720 ml   Output 1810 ml   Net -1090 ml       Physical Exam  HENT:      Head: Normocephalic.   Eyes:      Extraocular Movements: Extraocular movements intact.   Cardiovascular:      Rate and Rhythm: Normal rate and regular rhythm.      Comments: LVAD hum is smooth  Pulmonary:      Effort: Pulmonary effort is normal.      Breath sounds: Normal breath sounds.   Abdominal:      General: Abdomen is flat.      Palpations: Abdomen is soft.   Skin:     General: Skin is warm and dry.   Neurological:      General: No focal deficit present.         Significant Labs:  BMP:   Recent Labs   Lab 09/07/23 0448   *      K 4.2      CO2 24   BUN 11   CREATININE 1.1   CALCIUM 8.8   MG 1.5*     CBC:   Recent Labs   Lab 09/07/23 0448   WBC 8.05   RBC 3.87*   HGB 10.4*   HCT 34.6*      MCV 89   MCH 26.9*   MCHC 30.1*     CMP:   Recent Labs   Lab 09/07/23 0448   *   CALCIUM 8.8   ALBUMIN 2.7*   PROT 7.1      K 4.2   CO2 24      BUN 11   CREATININE 1.1   ALKPHOS 100   ALT <5*   AST 19   BILITOT 0.4     Coagulation:   Recent Labs   Lab 09/07/23 0448   INR 1.3*   APTT 43.6*       Significant Diagnostics:  I have reviewed and interpreted all pertinent imaging results/findings within the past 24 hours.    Procedure: Device Interrogation Including analysis of device parameters  Current Settings: Ventricular Assist Device  Review of device function is stable      9/7/2023     8:00 AM 9/7/2023     4:49 AM 9/7/2023    12:23 AM 9/6/2023     8:05 PM 9/6/2023     3:51 PM 9/6/2023    12:00 PM 9/6/2023     8:00 AM   TXP LVAD INTERROGATIONS   Type  HeartMate3 HeartMate3 HeartMate3 HeartMate3 HeartMate3 HeartMate3   Flow 3.7 3.8 4.1 4.3 4 4 4   Speed 5100 5100 5100 5100 5100 5100 5100   PI 6 5.5 4.1 3.6 4.5 5.7 4.7   Power (Serna) 3.9 3.6 3.6 3.7 3.8 33.6 3.6   LSL 4700 4700 4700 4700 4700 4700 4700   Pulsatility Intermittent pulse Pulse  Pulse Pulse Intermittent pulse Intermittent pulse Intermittent pulse         Assessment/Plan:     LVAD (left ventricular assist device) present  - Admitted for Left MCA stroke  - Implant Date: 6/29/2022 with Dr. Paige  - Speed: 5100  - Low Flow Events: None overnight  - Interval History was obtained from team member during rounding today.  - VAD/ANABELLE teaching was performed with patient.  - Mobilization/Physical Therapy is ongoing.  - Anticoagulation: Coumadin/Heparin  - INR: 1.3  - Urine Output: 1810  - BUN: 1.1   - Creat: 1.1  - LDH: 243  - Awake and alert in bed during assessment  - Awaiting therapeutic INR    More than 50 percent of the care dominated counseling and coordinating care with different team members. The VAD was interrogated and no significant findings were found in the history. All these findings are documented in the note above.        Vandana Coleman, LUCÍA  Cardiothoracic Surgery  [facility]

## 2023-09-07 NOTE — PROGRESS NOTES
09/07/2023  Ruma Li    Current provider:  Marlo Marques MD    Device interrogation:      9/7/2023     8:00 AM 9/7/2023     4:49 AM 9/7/2023    12:23 AM 9/6/2023     8:05 PM 9/6/2023     3:51 PM 9/6/2023    12:00 PM 9/6/2023     8:00 AM   TXP LVAD INTERROGATIONS   Type HeartMate3 HeartMate3 HeartMate3 HeartMate3 HeartMate3 HeartMate3 HeartMate3   Flow 3.7 3.8 4.1 4.3 4 4 4   Speed 5100 5100 5100 5100 5100 5100 5100   PI 6 5.5 4.1 3.6 4.5 5.7 4.7   Power (Serna) 3.9 3.6 3.6 3.7 3.8 33.6 3.6   LSL 4700 4700 4700 4700 4700 4700 4700   Pulsatility Intermittent pulse Pulse Pulse Pulse Intermittent pulse Intermittent pulse Intermittent pulse          Rounded on Kevan Queen to ensure all mechanical assist device settings (IABP or VAD) were appropriate and all parameters were within limits.  I was able to ensure all back up equipment was present, the staff had no issues, and the Perfusion Department daily rounding was complete.      For implantable VADs: Interrogation of Ventricular assist device was performed with analysis of device parameters and review of device function. I have personally reviewed the interrogation findings and agree with findings as stated.     In emergency, the nursing units have been notified to contact the perfusion department either by:  Calling [phone] from 630am to 4pm Mon thru Fri, utilizing the On-Call Finder functionality of Epic and searching for Perfusion, or by contacting the hospital  from 4pm to 630am and on weekends and asking to speak with the perfusionist on call.    3:44 PM

## 2023-09-07 NOTE — ASSESSMENT & PLAN NOTE
- Admitted for Left MCA stroke  - Implant Date: 6/29/2022 with Dr. Paige  - Speed: 5100  - Low Flow Events: None overnight  - Interval History was obtained from team member during rounding today.  - VAD/ANABELLE teaching was performed with patient.  - Mobilization/Physical Therapy is ongoing.  - Anticoagulation: Coumadin/Heparin  - INR: 1.3  - Urine Output: 1810  - BUN: 1.1   - Creat: 1.1  - LDH: 243  - Awake and alert in bed during assessment  - Awaiting therapeutic INR    More than 50 percent of the care dominated counseling and coordinating care with different team members. The VAD was interrogated and no significant findings were found in the history. All these findings are documented in the note above.

## 2023-09-07 NOTE — SUBJECTIVE & OBJECTIVE
Subjective:     Post-Op Info:  * No surgery found *          Reason for Visit:  Patient is seen in follow up management of LVAD Placement    Interval History: INR remains subtherapeutic, otherwise NAEON.       Medications:  Continuous Infusions:   heparin (porcine) in D5W 14 Units/kg/hr (09/07/23 0452)    milrinone 20mg/100ml D5W (200mcg/ml) 0.25 mcg/kg/min (09/06/23 2256)     Scheduled Meds:   aspirin  81 mg Oral Daily    atorvastatin  40 mg Oral Daily    busPIRone  10 mg Oral BID    ceFAZolin (ANCEF) 8 g in dextrose 5 % (D5W) 500 mL CONTINUOUS INFUSION  8 g Intravenous Q24H    furosemide  80 mg Oral Daily    insulin detemir U-100  6 Units Subcutaneous BID    levETIRAcetam  1,000 mg Oral BID    mirtazapine  15 mg Oral Nightly    pantoprazole  40 mg Oral Daily    polyethylene glycol  17 g Oral Daily    senna  8.6 mg Oral Daily    valsartan  40 mg Oral BID    warfarin  6 mg Oral Daily     PRN Meds:sodium chloride 0.9%, acetaminophen, dextrose 10%, dextrose 10%, glucagon (human recombinant), glucose, glucose, insulin aspart U-100     Objective:     Vital Signs (Most Recent):  Temp: 97.8 °F (36.6 °C) (09/07/23 0733)  Pulse: 86 (09/07/23 0733)  Resp: 18 (09/07/23 0733)  BP: (!) 64/0 (09/07/23 0733)  SpO2: 98 % (09/07/23 0733) Vital Signs (24h Range):  Temp:  [97.2 °F (36.2 °C)-97.9 °F (36.6 °C)] 97.8 °F (36.6 °C)  Pulse:  [] 86  Resp:  [18-20] 18  SpO2:  [96 %-100 %] 98 %  BP: ()/(0-64) 64/0       Intake/Output Summary (Last 24 hours) at 9/7/2023 0902  Last data filed at 9/6/2023 1953  Gross per 24 hour   Intake 720 ml   Output 1810 ml   Net -1090 ml       Physical Exam  HENT:      Head: Normocephalic.   Eyes:      Extraocular Movements: Extraocular movements intact.   Cardiovascular:      Rate and Rhythm: Normal rate and regular rhythm.      Comments: LVAD hum is smooth  Pulmonary:      Effort: Pulmonary effort is normal.      Breath sounds: Normal breath sounds.   Abdominal:      General: Abdomen is flat.       Palpations: Abdomen is soft.   Skin:     General: Skin is warm and dry.   Neurological:      General: No focal deficit present.         Significant Labs:  BMP:   Recent Labs   Lab 09/07/23 0448   *      K 4.2      CO2 24   BUN 11   CREATININE 1.1   CALCIUM 8.8   MG 1.5*     CBC:   Recent Labs   Lab 09/07/23 0448   WBC 8.05   RBC 3.87*   HGB 10.4*   HCT 34.6*      MCV 89   MCH 26.9*   MCHC 30.1*     CMP:   Recent Labs   Lab 09/07/23 0448   *   CALCIUM 8.8   ALBUMIN 2.7*   PROT 7.1      K 4.2   CO2 24      BUN 11   CREATININE 1.1   ALKPHOS 100   ALT <5*   AST 19   BILITOT 0.4     Coagulation:   Recent Labs   Lab 09/07/23 0448   INR 1.3*   APTT 43.6*       Significant Diagnostics:  I have reviewed and interpreted all pertinent imaging results/findings within the past 24 hours.    Procedure: Device Interrogation Including analysis of device parameters  Current Settings: Ventricular Assist Device  Review of device function is stable      9/7/2023     8:00 AM 9/7/2023     4:49 AM 9/7/2023    12:23 AM 9/6/2023     8:05 PM 9/6/2023     3:51 PM 9/6/2023    12:00 PM 9/6/2023     8:00 AM   TXP LVAD INTERROGATIONS   Type  HeartMate3 HeartMate3 HeartMate3 HeartMate3 HeartMate3 HeartMate3   Flow 3.7 3.8 4.1 4.3 4 4 4   Speed 5100 5100 5100 5100 5100 5100 5100   PI 6 5.5 4.1 3.6 4.5 5.7 4.7   Power (Serna) 3.9 3.6 3.6 3.7 3.8 33.6 3.6   LSL 4700 4700 4700 4700 4700 4700 4700   Pulsatility Intermittent pulse Pulse Pulse Pulse Intermittent pulse Intermittent pulse Intermittent pulse

## 2023-09-07 NOTE — SUBJECTIVE & OBJECTIVE
Interval History: No acute events overnight. He is net -1L today. INR remains subtherapeutic at 1.3 again today. Plans to discharge once INR reaches 1.8. Remains hemodynamically stable on home Milrinone dose with no low VAD alarms.       Continuous Infusions:   heparin (porcine) in D5W 14 Units/kg/hr (09/07/23 0452)    milrinone 20mg/100ml D5W (200mcg/ml) 0.25 mcg/kg/min (09/06/23 2256)     Scheduled Meds:   aspirin  81 mg Oral Daily    atorvastatin  40 mg Oral Daily    busPIRone  10 mg Oral BID    ceFAZolin (ANCEF) 8 g in dextrose 5 % (D5W) 500 mL CONTINUOUS INFUSION  8 g Intravenous Q24H    furosemide  80 mg Oral Daily    insulin detemir U-100  6 Units Subcutaneous BID    levETIRAcetam  1,000 mg Oral BID    magnesium oxide  200 mg Oral Daily    magnesium sulfate IVPB  2 g Intravenous Once    mirtazapine  15 mg Oral Nightly    pantoprazole  40 mg Oral Daily    polyethylene glycol  17 g Oral Daily    senna  8.6 mg Oral Daily    valsartan  40 mg Oral BID    warfarin  6 mg Oral Daily     PRN Meds:sodium chloride 0.9%, acetaminophen, dextrose 10%, dextrose 10%, glucagon (human recombinant), glucose, glucose, insulin aspart U-100    Review of patient's allergies indicates:   Allergen Reactions    Bumex [bumetanide] Hives    Torsemide Hives     Objective:     Vital Signs (Most Recent):  Temp: 97.8 °F (36.6 °C) (09/07/23 0733)  Pulse: 86 (09/07/23 0733)  Resp: 18 (09/07/23 0733)  BP: (!) 64/0 (09/07/23 0733)  SpO2: 98 % (09/07/23 0733) Vital Signs (24h Range):  Temp:  [97.2 °F (36.2 °C)-97.9 °F (36.6 °C)] 97.8 °F (36.6 °C)  Pulse:  [] 86  Resp:  [18-20] 18  SpO2:  [96 %-100 %] 98 %  BP: ()/(0-64) 64/0     Patient Vitals for the past 72 hrs (Last 3 readings):   Weight   09/06/23 0920 89.6 kg (197 lb 8.5 oz)   09/05/23 1010 89.8 kg (198 lb)       Body mass index is 24.69 kg/m².      Intake/Output Summary (Last 24 hours) at 9/7/2023 0958  Last data filed at 9/7/2023 0924  Gross per 24 hour   Intake 480 ml    Output 1960 ml   Net -1480 ml         Hemodynamic Parameters:       Telemetry: NSR        Physical Exam  Constitutional:       Appearance: Normal appearance.   HENT:      Head: Normocephalic and atraumatic.   Cardiovascular:      Rate and Rhythm: Normal rate and regular rhythm.      Pulses: Normal pulses.      Comments: Smooth VAD hum   Pulmonary:      Effort: Pulmonary effort is normal.      Breath sounds: Normal breath sounds.   Abdominal:      General: Bowel sounds are normal.      Palpations: Abdomen is soft.   Musculoskeletal:         General: Normal range of motion.      Cervical back: Normal range of motion and neck supple.      Right lower leg: No edema.      Left lower leg: No edema.   Skin:     General: Skin is warm and dry.   Neurological:      General: No focal deficit present.      Mental Status: He is alert and oriented to person, place, and time.            Significant Labs:  CBC:  Recent Labs   Lab 09/05/23 0319 09/06/23 0429 09/07/23 0448   WBC 7.90 9.20 8.05   RBC 3.86* 3.88* 3.87*   HGB 10.5* 10.2* 10.4*   HCT 34.0* 34.4* 34.6*    310 321   MCV 88 89 89   MCH 27.2 26.3* 26.9*   MCHC 30.9* 29.7* 30.1*       BNP:  Recent Labs   Lab 08/31/23  1046   *       CMP:  Recent Labs   Lab 09/05/23 0319 09/06/23 0429 09/07/23 0448   * 164* 180*   CALCIUM 8.4* 9.0 8.8   ALBUMIN 2.4* 2.7* 2.7*   PROT 6.7 7.1 7.1   * 140 138   K 4.0 4.4 4.2   CO2 26 25 24    105 103   BUN 4* 6 11   CREATININE 1.1 1.2 1.1   ALKPHOS 94 98 100   ALT <5* <5* <5*   AST 17 19 19   BILITOT 0.6 0.4 0.4        Coagulation:   Recent Labs   Lab 09/05/23 0319 09/05/23  0436 09/06/23 0429 09/06/23  1323 09/06/23 2118 09/07/23 0448   INR 1.4*  --  1.3*  --   --  1.3*   APTT  --    < > 34.0* 43.0* 44.1* 43.6*    < > = values in this interval not displayed.       LDH:  Recent Labs   Lab 09/05/23  0319 09/06/23  0429 09/07/23  0448    273* 243       Microbiology:  Microbiology Results (last 7  days)       ** No results found for the last 168 hours. **            BMP:   Recent Labs   Lab 09/07/23  0448   *      K 4.2      CO2 24   BUN 11   CREATININE 1.1   CALCIUM 8.8   MG 1.5*       I have reviewed all pertinent labs within the past 24 hours.    Estimated Creatinine Clearance: 108.8 mL/min (based on SCr of 1.1 mg/dL).    Diagnostic Results:  I have reviewed all pertinent imaging results/findings within the past 24 hours.

## 2023-09-07 NOTE — CARE UPDATE
-Glucose Goal 140-180    -A1C:   Hemoglobin A1C   Date Value Ref Range Status   08/31/2023 6.9 (H) 4.0 - 5.6 % Final     Comment:     ADA Screening Guidelines:  5.7-6.4%  Consistent with prediabetes  >or=6.5%  Consistent with diabetes    High levels of fetal hemoglobin interfere with the HbA1C  assay. Heterozygous hemoglobin variants (HbS, HgC, etc)do  not significantly interfere with this assay.   However, presence of multiple variants may affect accuracy.           -HOME REGIMEN:   -Levemir 22 units BID.   -Novolog 16 units TIDWM in addition to the following correction scale:     150 - 200 + 1 unit     201 - 250 + 2 units     251 - 300 + 3 units     301 - 350 + 4 units      > 350   + 5 units    -GLUCOSE TREND FOR THE PAST 24HRS:   Recent Labs   Lab 09/05/23  1717 09/05/23  2139 09/06/23  0747 09/06/23  1149 09/06/23  1632 09/06/23  1915   POCTGLUCOSE 208* 145* 176* 166* 282* 212*           -NO HYPOGYCEMIAS NOTED     - Diet  Diet Cardiac Double Portions; Fluid - 1500mL; Thin    T2DM.  LVAD.  BG stable on current regimen with an isolated elevation.  Will continue to monitor.    Plan:   - Levemir (Insulin Detemir) to 6 units BID  - Novolog (Insulin Aspart) prn for BG excursions MDC SSI (150/25)  - BG checks ac/hs   - Hypoglycemia protocol in place      ** Please notify Endocrine for any change and/or advance in diet**  ** Please call Endocrine for any BG related issues **     Discharge Planning:   TBD. Please notify endocrinology prior to discharge.

## 2023-09-07 NOTE — ASSESSMENT & PLAN NOTE
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 Repeated 7/29 with NGTD. Repeated again 7/30 NGTD  -PICC line replaced 8/1  -ECHO done 7/31 and no vegetations appreciated   -CT 8/1 with loculated fluid along Left lateral hear border, thoracic surgery consulted, not a candidate for VATS. Chest tube placed with IR 8/3, pleural fluid cultures positive for GPC  -Continue Ancef 8g IV q 24 hours for total of 6 weeks with end date 9/14 (home health orders completed).

## 2023-09-07 NOTE — ASSESSMENT & PLAN NOTE
- CTH with L parietal hypodensity with subtle areas of hyperdensity concerning for acute/subacute infarct with petechial hemorrhage, CTA negative for LVO or critical stenosis. Determined not a candidate for acute interventions with thrombolytics/thrombectomy.  - continue aspirin and statin.   -On heparin and Coumadin   - PT, OT, Speech consulted  -Will continue outpatient speech therapy

## 2023-09-07 NOTE — PLAN OF CARE
Pt free of falls and injury. Pt denies any pain or discomfort. Pt AAOx4. Fall precautions remain in place. Lab drawn and AM aptt is therapeutic. Milrinone is going at 0.25 mcg/kg/min, and Hep is going at 14 units/kg/hr. IV Ancef running continuously. BG monitored and insulin given as ordered. LVAD hum present and smooth. VAD numbers and dopplers WDL. DLES dressing CDI. Plan of care reviewed with pt. Will continue to monitor pt.

## 2023-09-08 DIAGNOSIS — Z95.811 LVAD (LEFT VENTRICULAR ASSIST DEVICE) PRESENT: Primary | ICD-10-CM

## 2023-09-08 LAB
ALBUMIN SERPL BCP-MCNC: 2.8 G/DL (ref 3.5–5.2)
ALP SERPL-CCNC: 99 U/L (ref 55–135)
ALT SERPL W/O P-5'-P-CCNC: <5 U/L (ref 10–44)
ANION GAP SERPL CALC-SCNC: 7 MMOL/L (ref 8–16)
APTT PPP: 40.3 SEC (ref 21–32)
AST SERPL-CCNC: 19 U/L (ref 10–40)
BASOPHILS # BLD AUTO: 0.04 K/UL (ref 0–0.2)
BASOPHILS NFR BLD: 0.5 % (ref 0–1.9)
BILIRUB SERPL-MCNC: 0.3 MG/DL (ref 0.1–1)
BUN SERPL-MCNC: 11 MG/DL (ref 6–20)
CALCIUM SERPL-MCNC: 9.4 MG/DL (ref 8.7–10.5)
CHLORIDE SERPL-SCNC: 104 MMOL/L (ref 95–110)
CO2 SERPL-SCNC: 28 MMOL/L (ref 23–29)
CREAT SERPL-MCNC: 1.1 MG/DL (ref 0.5–1.4)
DIFFERENTIAL METHOD: ABNORMAL
EOSINOPHIL # BLD AUTO: 0.1 K/UL (ref 0–0.5)
EOSINOPHIL NFR BLD: 1.6 % (ref 0–8)
ERYTHROCYTE [DISTWIDTH] IN BLOOD BY AUTOMATED COUNT: 19 % (ref 11.5–14.5)
EST. GFR  (NO RACE VARIABLE): >60 ML/MIN/1.73 M^2
GLUCOSE SERPL-MCNC: 198 MG/DL (ref 70–110)
HCT VFR BLD AUTO: 34 % (ref 40–54)
HGB BLD-MCNC: 10.3 G/DL (ref 14–18)
IMM GRANULOCYTES # BLD AUTO: 0.01 K/UL (ref 0–0.04)
IMM GRANULOCYTES NFR BLD AUTO: 0.1 % (ref 0–0.5)
INR PPP: 1.6 (ref 0.8–1.2)
LDH SERPL L TO P-CCNC: 239 U/L (ref 110–260)
LYMPHOCYTES # BLD AUTO: 1.8 K/UL (ref 1–4.8)
LYMPHOCYTES NFR BLD: 22.3 % (ref 18–48)
MAGNESIUM SERPL-MCNC: 1.7 MG/DL (ref 1.6–2.6)
MCH RBC QN AUTO: 26.5 PG (ref 27–31)
MCHC RBC AUTO-ENTMCNC: 30.3 G/DL (ref 32–36)
MCV RBC AUTO: 88 FL (ref 82–98)
MONOCYTES # BLD AUTO: 0.7 K/UL (ref 0.3–1)
MONOCYTES NFR BLD: 7.9 % (ref 4–15)
NEUTROPHILS # BLD AUTO: 5.6 K/UL (ref 1.8–7.7)
NEUTROPHILS NFR BLD: 67.6 % (ref 38–73)
NRBC BLD-RTO: 0 /100 WBC
PHOSPHATE SERPL-MCNC: 3.4 MG/DL (ref 2.7–4.5)
PLATELET # BLD AUTO: 332 K/UL (ref 150–450)
PMV BLD AUTO: 10 FL (ref 9.2–12.9)
POCT GLUCOSE: 173 MG/DL (ref 70–110)
POCT GLUCOSE: 188 MG/DL (ref 70–110)
POCT GLUCOSE: 200 MG/DL (ref 70–110)
POCT GLUCOSE: 219 MG/DL (ref 70–110)
POTASSIUM SERPL-SCNC: 4.2 MMOL/L (ref 3.5–5.1)
PROT SERPL-MCNC: 7.4 G/DL (ref 6–8.4)
PROTHROMBIN TIME: 16.7 SEC (ref 9–12.5)
RBC # BLD AUTO: 3.88 M/UL (ref 4.6–6.2)
SODIUM SERPL-SCNC: 139 MMOL/L (ref 136–145)
WBC # BLD AUTO: 8.26 K/UL (ref 3.9–12.7)

## 2023-09-08 PROCEDURE — 97116 GAIT TRAINING THERAPY: CPT

## 2023-09-08 PROCEDURE — 83735 ASSAY OF MAGNESIUM: CPT | Performed by: INTERNAL MEDICINE

## 2023-09-08 PROCEDURE — 25000003 PHARM REV CODE 250: Performed by: REGISTERED NURSE

## 2023-09-08 PROCEDURE — 20600001 HC STEP DOWN PRIVATE ROOM

## 2023-09-08 PROCEDURE — 25000003 PHARM REV CODE 250: Performed by: INTERNAL MEDICINE

## 2023-09-08 PROCEDURE — 25000003 PHARM REV CODE 250: Performed by: PHYSICIAN ASSISTANT

## 2023-09-08 PROCEDURE — 92507 TX SP LANG VOICE COMM INDIV: CPT

## 2023-09-08 PROCEDURE — 83615 LACTATE (LD) (LDH) ENZYME: CPT | Performed by: PHYSICIAN ASSISTANT

## 2023-09-08 PROCEDURE — 97530 THERAPEUTIC ACTIVITIES: CPT

## 2023-09-08 PROCEDURE — 27000248 HC VAD-ADDITIONAL DAY

## 2023-09-08 PROCEDURE — 63600175 PHARM REV CODE 636 W HCPCS: Performed by: PHYSICIAN ASSISTANT

## 2023-09-08 PROCEDURE — 93750 INTERROGATION VAD IN PERSON: CPT | Mod: ,,, | Performed by: INTERNAL MEDICINE

## 2023-09-08 PROCEDURE — 99233 SBSQ HOSP IP/OBS HIGH 50: CPT | Mod: FS,,, | Performed by: INTERNAL MEDICINE

## 2023-09-08 PROCEDURE — 84100 ASSAY OF PHOSPHORUS: CPT | Performed by: INTERNAL MEDICINE

## 2023-09-08 PROCEDURE — 85730 THROMBOPLASTIN TIME PARTIAL: CPT | Performed by: INTERNAL MEDICINE

## 2023-09-08 PROCEDURE — 25000003 PHARM REV CODE 250: Performed by: STUDENT IN AN ORGANIZED HEALTH CARE EDUCATION/TRAINING PROGRAM

## 2023-09-08 PROCEDURE — 85025 COMPLETE CBC W/AUTO DIFF WBC: CPT | Performed by: INTERNAL MEDICINE

## 2023-09-08 PROCEDURE — 80053 COMPREHEN METABOLIC PANEL: CPT | Performed by: INTERNAL MEDICINE

## 2023-09-08 PROCEDURE — 85610 PROTHROMBIN TIME: CPT | Performed by: INTERNAL MEDICINE

## 2023-09-08 PROCEDURE — 99233 PR SUBSEQUENT HOSPITAL CARE,LEVL III: ICD-10-PCS | Mod: FS,,, | Performed by: INTERNAL MEDICINE

## 2023-09-08 PROCEDURE — 93750 PR INTERROGATE VENT ASSIST DEV, IN PERSON, W PHYSICIAN ANALYSIS: ICD-10-PCS | Mod: ,,, | Performed by: INTERNAL MEDICINE

## 2023-09-08 RX ADMIN — LEVETIRACETAM 1000 MG: 500 TABLET, FILM COATED ORAL at 09:09

## 2023-09-08 RX ADMIN — FUROSEMIDE 80 MG: 80 TABLET ORAL at 09:09

## 2023-09-08 RX ADMIN — BUSPIRONE HYDROCHLORIDE 10 MG: 10 TABLET ORAL at 09:09

## 2023-09-08 RX ADMIN — WARFARIN SODIUM 6 MG: 3 TABLET ORAL at 04:09

## 2023-09-08 RX ADMIN — MILRINONE LACTATE IN DEXTROSE 0.25 MCG/KG/MIN: 200 INJECTION, SOLUTION INTRAVENOUS at 09:09

## 2023-09-08 RX ADMIN — PANTOPRAZOLE SODIUM 40 MG: 40 TABLET, DELAYED RELEASE ORAL at 09:09

## 2023-09-08 RX ADMIN — ASPIRIN 81 MG 81 MG: 81 TABLET ORAL at 09:09

## 2023-09-08 RX ADMIN — INSULIN ASPART 2 UNITS: 100 INJECTION, SOLUTION INTRAVENOUS; SUBCUTANEOUS at 01:09

## 2023-09-08 RX ADMIN — ATORVASTATIN CALCIUM 40 MG: 20 TABLET, FILM COATED ORAL at 09:09

## 2023-09-08 RX ADMIN — BUSPIRONE HYDROCHLORIDE 10 MG: 10 TABLET ORAL at 08:09

## 2023-09-08 RX ADMIN — INSULIN ASPART 2 UNITS: 100 INJECTION, SOLUTION INTRAVENOUS; SUBCUTANEOUS at 06:09

## 2023-09-08 RX ADMIN — MILRINONE LACTATE IN DEXTROSE 0.25 MCG/KG/MIN: 200 INJECTION, SOLUTION INTRAVENOUS at 11:09

## 2023-09-08 RX ADMIN — LEVETIRACETAM 1000 MG: 500 TABLET, FILM COATED ORAL at 08:09

## 2023-09-08 RX ADMIN — Medication 200 MG: at 09:09

## 2023-09-08 RX ADMIN — ACETAMINOPHEN 650 MG: 325 TABLET ORAL at 08:09

## 2023-09-08 RX ADMIN — VALSARTAN 40 MG: 40 TABLET, FILM COATED ORAL at 08:09

## 2023-09-08 RX ADMIN — INSULIN ASPART 2 UNITS: 100 INJECTION, SOLUTION INTRAVENOUS; SUBCUTANEOUS at 08:09

## 2023-09-08 RX ADMIN — INSULIN ASPART 2 UNITS: 100 INJECTION, SOLUTION INTRAVENOUS; SUBCUTANEOUS at 09:09

## 2023-09-08 RX ADMIN — VALSARTAN 40 MG: 40 TABLET, FILM COATED ORAL at 09:09

## 2023-09-08 RX ADMIN — MIRTAZAPINE 15 MG: 15 TABLET, FILM COATED ORAL at 08:09

## 2023-09-08 RX ADMIN — ACETAMINOPHEN 650 MG: 325 TABLET ORAL at 01:09

## 2023-09-08 RX ADMIN — CEFAZOLIN 8 G: 10 INJECTION, POWDER, FOR SOLUTION INTRAVENOUS at 01:09

## 2023-09-08 NOTE — SUBJECTIVE & OBJECTIVE
Interval History: No acute events overnight. INR has come up to 1.6. Patient feels his strength is back to baseline.       Continuous Infusions:   heparin (porcine) in D5W 14 Units/kg/hr (09/07/23 2208)    milrinone 20mg/100ml D5W (200mcg/ml) 0.25 mcg/kg/min (09/08/23 0929)     Scheduled Meds:   aspirin  81 mg Oral Daily    atorvastatin  40 mg Oral Daily    busPIRone  10 mg Oral BID    ceFAZolin (ANCEF) 8 g in dextrose 5 % (D5W) 500 mL CONTINUOUS INFUSION  8 g Intravenous Q24H    furosemide  80 mg Oral Daily    insulin detemir U-100  8 Units Subcutaneous BID    levETIRAcetam  1,000 mg Oral BID    magnesium oxide  200 mg Oral Daily    mirtazapine  15 mg Oral Nightly    pantoprazole  40 mg Oral Daily    polyethylene glycol  17 g Oral Daily    senna  8.6 mg Oral Daily    valsartan  40 mg Oral BID    warfarin  6 mg Oral Daily     PRN Meds:sodium chloride 0.9%, acetaminophen, dextrose 10%, dextrose 10%, glucagon (human recombinant), glucose, glucose, insulin aspart U-100    Review of patient's allergies indicates:   Allergen Reactions    Bumex [bumetanide] Hives    Torsemide Hives     Objective:     Vital Signs (Most Recent):  Temp: 97.6 °F (36.4 °C) (09/08/23 0816)  Pulse: 67 (09/08/23 0610)  Resp: 16 (09/08/23 0816)  BP: (!) 78/0 (09/08/23 0816)  SpO2: 100 % (09/08/23 0523) Vital Signs (24h Range):  Temp:  [97.6 °F (36.4 °C)-98.7 °F (37.1 °C)] 97.6 °F (36.4 °C)  Pulse:  [] 67  Resp:  [16-18] 16  SpO2:  [97 %-100 %] 100 %  BP: ()/(0-68) 78/0     Patient Vitals for the past 72 hrs (Last 3 readings):   Weight   09/06/23 0920 89.6 kg (197 lb 8.5 oz)   09/05/23 1010 89.8 kg (198 lb)       Body mass index is 24.69 kg/m².      Intake/Output Summary (Last 24 hours) at 9/8/2023 0952  Last data filed at 9/8/2023 0825  Gross per 24 hour   Intake 3082.47 ml   Output 2600 ml   Net 482.47 ml         Hemodynamic Parameters:       Telemetry: NSR        Physical Exam  Constitutional:       Appearance: Normal appearance.  "  HENT:      Head: Normocephalic and atraumatic.   Cardiovascular:      Rate and Rhythm: Normal rate and regular rhythm.      Pulses: Normal pulses.      Comments: Smooth VAD hum   Pulmonary:      Effort: Pulmonary effort is normal.      Breath sounds: Normal breath sounds.   Abdominal:      General: Bowel sounds are normal.      Palpations: Abdomen is soft.   Musculoskeletal:         General: Normal range of motion.      Cervical back: Normal range of motion and neck supple.      Right lower leg: No edema.      Left lower leg: No edema.   Skin:     General: Skin is warm and dry.   Neurological:      General: No focal deficit present.      Mental Status: He is alert and oriented to person, place, and time.            Significant Labs:  CBC:  Recent Labs   Lab 09/06/23 0429 09/07/23 0448 09/08/23 0623   WBC 9.20 8.05 8.26   RBC 3.88* 3.87* 3.88*   HGB 10.2* 10.4* 10.3*   HCT 34.4* 34.6* 34.0*    321 332   MCV 89 89 88   MCH 26.3* 26.9* 26.5*   MCHC 29.7* 30.1* 30.3*       BNP:  No results for input(s): "BNP" in the last 168 hours.    Invalid input(s): "BNPTRIAGELBLO"    CMP:  Recent Labs   Lab 09/06/23 0429 09/07/23 0448 09/08/23 0623   * 180* 198*   CALCIUM 9.0 8.8 9.4   ALBUMIN 2.7* 2.7* 2.8*   PROT 7.1 7.1 7.4    138 139   K 4.4 4.2 4.2   CO2 25 24 28    103 104   BUN 6 11 11   CREATININE 1.2 1.1 1.1   ALKPHOS 98 100 99   ALT <5* <5* <5*   AST 19 19 19   BILITOT 0.4 0.4 0.3        Coagulation:   Recent Labs   Lab 09/06/23 0429 09/06/23  1323 09/06/23 2118 09/07/23 0448 09/08/23 0623   INR 1.3*  --   --  1.3* 1.6*   APTT 34.0*   < > 44.1* 43.6* 40.3*    < > = values in this interval not displayed.       LDH:  Recent Labs   Lab 09/06/23  0429 09/07/23  0448 09/08/23 0623   * 243 239       Microbiology:  Microbiology Results (last 7 days)       ** No results found for the last 168 hours. **            BMP:   Recent Labs   Lab 09/08/23 0623   *      K 4.2   CL " 104   CO2 28   BUN 11   CREATININE 1.1   CALCIUM 9.4   MG 1.7       I have reviewed all pertinent labs within the past 24 hours.    Estimated Creatinine Clearance: 108.8 mL/min (based on SCr of 1.1 mg/dL).    Diagnostic Results:  I have reviewed all pertinent imaging results/findings within the past 24 hours.

## 2023-09-08 NOTE — PROGRESS NOTES
Discharge    Per MDR pt to discharge Saturday with [facility] ph [phone], fax [phone] for  and IV ABX. Orders are in an agency is aware. Pt goes to their infusion center and does not have home health. Transplant SW On Call will need to be called to arrange medication delivery and possibly transportation. See below.    Pt presents in room aaox4 with open affect. Pt voices agreement with plan to discharge to home tomorrow. Pt states if his mother does not have to work she may be able to drive pt home. If not, Transplant SW On Call can call [employer] [facility] Discharge: [phone]. If Medicaid unable to find a ride. [facility] can be used for transportation. Pt is ambulatory and traveling alone.     Pt referred earlier this week for out pt speech therapy by JEAN HERNANDEZ. Per , provided pt with phone # to call and contact scheduling dept if he has not been called within a week of discharge. Pt voices understanding.    Pt reports coping well and denies further needs, questions, concerns at this time and none indicated. Providing psychosocial and counseling support, education, resources, assistance and discharge planning as indicated. SW remains available.

## 2023-09-08 NOTE — SUBJECTIVE & OBJECTIVE
Subjective:     Post-Op Info:  * No surgery found *          Reason for Visit:  Patient is seen in follow up management of LVAD/ L MCA Stroke    Interval History: INR remains subtherapeutic, patient feels back to baseline      Medications:  Continuous Infusions:   heparin (porcine) in D5W 14 Units/kg/hr (09/07/23 2208)    milrinone 20mg/100ml D5W (200mcg/ml) 0.25 mcg/kg/min (09/08/23 0929)     Scheduled Meds:   aspirin  81 mg Oral Daily    atorvastatin  40 mg Oral Daily    busPIRone  10 mg Oral BID    ceFAZolin (ANCEF) 8 g in dextrose 5 % (D5W) 500 mL CONTINUOUS INFUSION  8 g Intravenous Q24H    furosemide  80 mg Oral Daily    insulin detemir U-100  8 Units Subcutaneous BID    levETIRAcetam  1,000 mg Oral BID    magnesium oxide  200 mg Oral Daily    mirtazapine  15 mg Oral Nightly    pantoprazole  40 mg Oral Daily    polyethylene glycol  17 g Oral Daily    senna  8.6 mg Oral Daily    valsartan  40 mg Oral BID    warfarin  6 mg Oral Daily     PRN Meds:sodium chloride 0.9%, acetaminophen, dextrose 10%, dextrose 10%, glucagon (human recombinant), glucose, glucose, insulin aspart U-100     Objective:     Vital Signs (Most Recent):  Temp: 98.2 °F (36.8 °C) (09/08/23 1114)  Pulse: (!) 119 (09/08/23 1228)  Resp: 16 (09/08/23 1114)  BP: (!) 82/0 (09/08/23 1114)  SpO2: 99 % (09/08/23 1114) Vital Signs (24h Range):  Temp:  [97.6 °F (36.4 °C)-98.7 °F (37.1 °C)] 98.2 °F (36.8 °C)  Pulse:  [] 119  Resp:  [16-18] 16  SpO2:  [97 %-100 %] 99 %  BP: ()/(0-68) 82/0       Intake/Output Summary (Last 24 hours) at 9/8/2023 1447  Last data filed at 9/8/2023 1352  Gross per 24 hour   Intake 2662.47 ml   Output 3250 ml   Net -587.53 ml       Physical Exam  HENT:      Head: Normocephalic.   Eyes:      Extraocular Movements: Extraocular movements intact.   Cardiovascular:      Rate and Rhythm: Normal rate and regular rhythm.      Comments: LVAD hum is smooth  Pulmonary:      Effort: Pulmonary effort is normal.      Breath sounds:  Normal breath sounds.   Abdominal:      General: Abdomen is flat.      Palpations: Abdomen is soft.   Skin:     General: Skin is warm and dry.   Neurological:      General: No focal deficit present.         Significant Labs:  BMP:   Recent Labs   Lab 09/08/23 0623   *      K 4.2      CO2 28   BUN 11   CREATININE 1.1   CALCIUM 9.4   MG 1.7     CBC:   Recent Labs   Lab 09/08/23 0623   WBC 8.26   RBC 3.88*   HGB 10.3*   HCT 34.0*      MCV 88   MCH 26.5*   MCHC 30.3*     CMP:   Recent Labs   Lab 09/08/23 0623   *   CALCIUM 9.4   ALBUMIN 2.8*   PROT 7.4      K 4.2   CO2 28      BUN 11   CREATININE 1.1   ALKPHOS 99   ALT <5*   AST 19   BILITOT 0.3     Coagulation:   Recent Labs   Lab 09/08/23 0623   INR 1.6*   APTT 40.3*       Significant Diagnostics:  I have reviewed and interpreted all pertinent imaging results/findings within the past 24 hours.    Procedure: Device Interrogation Including analysis of device parameters  Current Settings: Ventricular Assist Device  Review of device function is stable      9/8/2023    11:14 AM 9/8/2023     9:24 AM 9/8/2023     6:03 AM 9/7/2023    11:30 PM 9/7/2023     8:16 PM 9/7/2023     4:00 PM 9/7/2023    12:00 PM   TXP LVAD INTERROGATIONS   Type HeartMate3 HeartMate3 HeartMate3 HeartMate3 HeartMate3 HeartMate3 HeartMate3   Flow 4.1 4.1 4.1 4.1 4.1 3.7 3.7   Speed 5100 5100 5100 5100 5100 5100 5100   PI 5.2 5.5 4.3 4.3 4.3 5.6 5.8   Power (Serna) 3.7 3.7 3.7 3.7 3.7 3.7 3.9   LSL  4700 4700 4700 4700 4700 4700   Pulsatility   Intermittent pulse Intermittent pulse Intermittent pulse  Intermittent pulse

## 2023-09-08 NOTE — PLAN OF CARE
Problem: Physical Therapy  Goal: Physical Therapy Goal  Description: Goals to be met by: 23     Patient will increase functional independence with mobility by performin. Supine to sit with Modified [place] -met   2. Sit to stand transfer with Supervision - met   3. Gait  x 250 feet with Stand-by Assistance using AD if needed. -  met   4. Pt transfer bed to bedside chair with SBA. . -  met     Outcome: Met   Goals met and pt does not need cont PT. 2023

## 2023-09-08 NOTE — PROGRESS NOTES
[facility] - [facility]  Heart Transplant  Progress Note    Patient Name: Kevan Queen  MRN: [id number]  Admission Date: 8/31/2023  Hospital Length of Stay: 8 days  Attending Physician: Marlo Marques MD  Primary Care Provider: Rosio Armendariz FNP  Principal Problem:Embolic stroke involving left middle cerebral artery    Subjective:     Interval History: No acute events overnight. INR has come up to 1.6. Patient feels his strength is back to baseline.       Continuous Infusions:   heparin (porcine) in D5W 14 Units/kg/hr (09/07/23 2208)    milrinone 20mg/100ml D5W (200mcg/ml) 0.25 mcg/kg/min (09/08/23 0929)     Scheduled Meds:   aspirin  81 mg Oral Daily    atorvastatin  40 mg Oral Daily    busPIRone  10 mg Oral BID    ceFAZolin (ANCEF) 8 g in dextrose 5 % (D5W) 500 mL CONTINUOUS INFUSION  8 g Intravenous Q24H    furosemide  80 mg Oral Daily    insulin detemir U-100  8 Units Subcutaneous BID    levETIRAcetam  1,000 mg Oral BID    magnesium oxide  200 mg Oral Daily    mirtazapine  15 mg Oral Nightly    pantoprazole  40 mg Oral Daily    polyethylene glycol  17 g Oral Daily    senna  8.6 mg Oral Daily    valsartan  40 mg Oral BID    warfarin  6 mg Oral Daily     PRN Meds:sodium chloride 0.9%, acetaminophen, dextrose 10%, dextrose 10%, glucagon (human recombinant), glucose, glucose, insulin aspart U-100    Review of patient's allergies indicates:   Allergen Reactions    Bumex [bumetanide] Hives    Torsemide Hives     Objective:     Vital Signs (Most Recent):  Temp: 97.6 °F (36.4 °C) (09/08/23 0816)  Pulse: 67 (09/08/23 0610)  Resp: 16 (09/08/23 0816)  BP: (!) 78/0 (09/08/23 0816)  SpO2: 100 % (09/08/23 0523) Vital Signs (24h Range):  Temp:  [97.6 °F (36.4 °C)-98.7 °F (37.1 °C)] 97.6 °F (36.4 °C)  Pulse:  [] 67  Resp:  [16-18] 16  SpO2:  [97 %-100 %] 100 %  BP: ()/(0-68) 78/0     Patient Vitals for the past 72 hrs (Last 3 readings):   Weight   09/06/23 0920 89.6 kg (197 lb 8.5 oz)  "  09/05/23 1010 89.8 kg (198 lb)       Body mass index is 24.69 kg/m².      Intake/Output Summary (Last 24 hours) at 9/8/2023 0952  Last data filed at 9/8/2023 0825  Gross per 24 hour   Intake 3082.47 ml   Output 2600 ml   Net 482.47 ml         Hemodynamic Parameters:       Telemetry: NSR        Physical Exam  Constitutional:       Appearance: Normal appearance.   HENT:      Head: Normocephalic and atraumatic.   Cardiovascular:      Rate and Rhythm: Normal rate and regular rhythm.      Pulses: Normal pulses.      Comments: Smooth VAD hum   Pulmonary:      Effort: Pulmonary effort is normal.      Breath sounds: Normal breath sounds.   Abdominal:      General: Bowel sounds are normal.      Palpations: Abdomen is soft.   Musculoskeletal:         General: Normal range of motion.      Cervical back: Normal range of motion and neck supple.      Right lower leg: No edema.      Left lower leg: No edema.   Skin:     General: Skin is warm and dry.   Neurological:      General: No focal deficit present.      Mental Status: He is alert and oriented to person, place, and time.            Significant Labs:  CBC:  Recent Labs   Lab 09/06/23 0429 09/07/23 0448 09/08/23  0623   WBC 9.20 8.05 8.26   RBC 3.88* 3.87* 3.88*   HGB 10.2* 10.4* 10.3*   HCT 34.4* 34.6* 34.0*    321 332   MCV 89 89 88   MCH 26.3* 26.9* 26.5*   MCHC 29.7* 30.1* 30.3*       BNP:  No results for input(s): "BNP" in the last 168 hours.    Invalid input(s): "BNPTRIAGELBLO"    CMP:  Recent Labs   Lab 09/06/23 0429 09/07/23 0448 09/08/23  0623   * 180* 198*   CALCIUM 9.0 8.8 9.4   ALBUMIN 2.7* 2.7* 2.8*   PROT 7.1 7.1 7.4    138 139   K 4.4 4.2 4.2   CO2 25 24 28    103 104   BUN 6 11 11   CREATININE 1.2 1.1 1.1   ALKPHOS 98 100 99   ALT <5* <5* <5*   AST 19 19 19   BILITOT 0.4 0.4 0.3        Coagulation:   Recent Labs   Lab 09/06/23  0429 09/06/23  1323 09/06/23  2118 09/07/23  0448 09/08/23  0623   INR 1.3*  --   --  1.3* 1.6*   APTT " 34.0*   < > 44.1* 43.6* 40.3*    < > = values in this interval not displayed.       LDH:  Recent Labs   Lab 09/06/23  0429 09/07/23  0448 09/08/23  0623   * 243 239       Microbiology:  Microbiology Results (last 7 days)       ** No results found for the last 168 hours. **            BMP:   Recent Labs   Lab 09/08/23 0623   *      K 4.2      CO2 28   BUN 11   CREATININE 1.1   CALCIUM 9.4   MG 1.7       I have reviewed all pertinent labs within the past 24 hours.    Estimated Creatinine Clearance: 108.8 mL/min (based on SCr of 1.1 mg/dL).    Diagnostic Results:  I have reviewed all pertinent imaging results/findings within the past 24 hours.    Assessment and Plan:     Mr. Kevan Thacker is a 39 y/o AAM w/ PMH of NICM (possible Familial w/ father having LVAD and subsequent transplant) s/p DT-HM3 (6/3/2022), HFrEF, chronic DLES, MSSA bacteremia c/b L empyema (maintained on ancef, maria isabel 9/14), seizures, and IDDM2 who initially presented to [facility] with aphasia and right sided weekness. He was reportedly not taking his coumadin or keppra for the past week due to running out. As per chart review, patient started to experience symptoms around 1am when he went to sleep. Symptoms worsened and EMS was called around 3:30am. Upon admission patient underwent a CT head showed an acute to subacute infarct with possible petechial hemorrhage. Labs were significant for INR of 1.2. Patient was loaded with Keppra and transferred to [facility] for further care.      Upon arrival, patient was found to be hemodynamically stable w/ MAPs of 106. However, he was noted to be not alert or oriented and extremely lethargic. Patient was noted to have GCS 10-11 with minimally reactive pupils. Patient underwent stat CT brain which showed no acute changes since prior CT, and transferred to the ICU for closer monitoring. Once in the ICU patient was intubated for airway protection.      Of note, patient was most recently  hospitalized from 7/22/2023-8/8/2023 for sepsis/covod. Found to have loculated pleural effussion requiring chest tube placement and three doses of lytics. Effussion improved and patient was discharged with 6 week course of Ancef (to end on 9/14).        Home Medications:    Aspirin 81mg daily   Buspirone 10mg bid   Furosemide 80mg daily   Insulin detemir 25 units bid   Insulin aspart 14 units w/ meals   Milrinone 0.25   Mirtazapine 15mg nightly   Valsartan 40mg bi   Warfarin      Cardiac Imaging:    Echo (7/31/2023): HM3 at 5100. AV does not open. IV septum midline. EF 10-15%. LV severely dilated. Normal wall thickness. Severe global hypokinesis. RV not well visualized due to poor acoustic window. Biatrial enlargment. Mod TR.        * Embolic stroke involving left middle cerebral artery  - CTH with L parietal hypodensity with subtle areas of hyperdensity concerning for acute/subacute infarct with petechial hemorrhage, CTA negative for LVO or critical stenosis. Determined not a candidate for acute interventions with thrombolytics/thrombectomy.  - continue aspirin and statin.   -On heparin and Coumadin   - PT, OT, Speech consulted  -Will continue outpatient speech therapy      Bacteremia due to Staphylococcus  -H/O chronic MSSA DLES infection for which he is on Doxycycline at home  -Blood cxs here +ve for MSSA through 7/28. Repeated 7/29 with NGTD. Repeated again 7/30 NGTD  -PICC line replaced 8/1  -ECHO done 7/31 and no vegetations appreciated   -CT 8/1 with loculated fluid along Left lateral hear border, thoracic surgery consulted, not a candidate for VATS. Chest tube placed with IR 8/3, pleural fluid cultures positive for GPC  -Continue Ancef 8g IV q 24 hours for total of 6 weeks with end date 9/14 (home health orders completed).     Seizure-like activity  - loaded with keppra in ED prior to arrival.   - EEG unremarkable.  Will continue keppra 1000mg bid as per Neuro recs   -no seizures noted overnight     On  mechanically assisted ventilation  -resolved  -Patient self extubated 9/1  -Mechanically ventillatied for airway protection in the setting of AMS w/ stroke.   - Pulmonology consulted to assist with management. Appreciate their assistance     LVAD (left ventricular assist device) present  -HeartMate 3 Implanted on 6/29/2022 as DT with RV failure on milrinone at 0.25 mcg/kg/min.  -Coumadin held intially due to concern for petechial hemorrhage. Repeat CT stable so started on heparin bridge.Coumadin resumed. Goal INR 2.0-3.0. INR subtherapeutic today at 1.6. Trend daily.    -LDH is stable.  Will continue to monitor daily  -Speed set at 5100, LSL 4700 rpm  -No Low flow alarms noted        Procedure: Device Interrogation Including analysis of device parameters  Current Settings: Ventricular Assist Device  Review of device function is stable        9/8/2023     9:24 AM 9/8/2023     6:03 AM 9/7/2023    11:30 PM 9/7/2023     8:16 PM 9/7/2023     4:00 PM 9/7/2023    12:00 PM 9/7/2023     8:00 AM   TXP LVAD INTERROGATIONS   Type HeartMate3 HeartMate3 HeartMate3 HeartMate3 HeartMate3 HeartMate3 HeartMate3   Flow 4.1 4.1 4.1 4.1 3.7 3.7 3.7   Speed 5100 5100 5100 5100 5100 5100 5100   PI 5.5 4.3 4.3 4.3 5.6 5.8 6   Power (Serna) 3.7 3.7 3.7 3.7 3.7 3.9 3.9   LSL 4700 4700 4700 4700 4700 4700 4700   Pulsatility  Intermittent pulse Intermittent pulse Intermittent pulse  Intermittent pulse Intermittent pulse       Type 2 diabetes mellitus with hyperglycemia  - management as per endocrinology.     Acute combined systolic and diastolic congestive heart failure  -NICM s/p LVAD and on Milrinone @ 0.25   -Echo on (9/5/23): HM3 at 5100.The aortic valve does not open. septum is at midline. LV severely dilated. severely reduced systolic function with EF 10 -15%. There is normal diastolic function. RV Systolic function is normal. Mild MR, Severe TR  - Home GDMT: valsartan 40mg bid (continued)  - Home diuretic regimen: Lasix 80mg daily.   (continued)   - Ionotropes: continue home milrinone 0.25.   - Strict I&Os and daily weights.   - Maintain K>4 and Mg>2        Jeff Spencer PA-C  Heart Transplant  [facility] - Cardiology Stepdown

## 2023-09-08 NOTE — PROGRESS NOTES
[facility] - [facility] Step[facility]  Cardiothoracic Surgery  Evaluation and Management/VAD interrogation       Patient Name: Kevan Queen  MRN: [id number]  Admission Date: 8/31/2023  Hospital Length of Stay: 8 days  Code Status: Full Code   Attending Physician: Marlo Marques MD   Referring Provider: Rodger Yadav IV, MD  Principal Problem:Embolic stroke involving left middle cerebral artery  Subjective:     Post-Op Info:  * No surgery found *         Subjective:     Post-Op Info:  * No surgery found *          Reason for Visit:  Patient is seen in follow up management of LVAD/ L MCA Stroke    Interval History: INR remains subtherapeutic, patient feels back to baseline      Medications:  Continuous Infusions:   heparin (porcine) in D5W 14 Units/kg/hr (09/07/23 2208)    milrinone 20mg/100ml D5W (200mcg/ml) 0.25 mcg/kg/min (09/08/23 0929)     Scheduled Meds:   aspirin  81 mg Oral Daily    atorvastatin  40 mg Oral Daily    busPIRone  10 mg Oral BID    ceFAZolin (ANCEF) 8 g in dextrose 5 % (D5W) 500 mL CONTINUOUS INFUSION  8 g Intravenous Q24H    furosemide  80 mg Oral Daily    insulin detemir U-100  8 Units Subcutaneous BID    levETIRAcetam  1,000 mg Oral BID    magnesium oxide  200 mg Oral Daily    mirtazapine  15 mg Oral Nightly    pantoprazole  40 mg Oral Daily    polyethylene glycol  17 g Oral Daily    senna  8.6 mg Oral Daily    valsartan  40 mg Oral BID    warfarin  6 mg Oral Daily     PRN Meds:sodium chloride 0.9%, acetaminophen, dextrose 10%, dextrose 10%, glucagon (human recombinant), glucose, glucose, insulin aspart U-100     Objective:     Vital Signs (Most Recent):  Temp: 98.2 °F (36.8 °C) (09/08/23 1114)  Pulse: (!) 119 (09/08/23 1228)  Resp: 16 (09/08/23 1114)  BP: (!) 82/0 (09/08/23 1114)  SpO2: 99 % (09/08/23 1114) Vital Signs (24h Range):  Temp:  [97.6 °F (36.4 °C)-98.7 °F (37.1 °C)] 98.2 °F (36.8 °C)  Pulse:  [] 119  Resp:  [16-18] 16  SpO2:  [97 %-100 %] 99 %  BP: ()/(0-68)  82/0       Intake/Output Summary (Last 24 hours) at 9/8/2023 1447  Last data filed at 9/8/2023 1352  Gross per 24 hour   Intake 2662.47 ml   Output 3250 ml   Net -587.53 ml       Physical Exam  HENT:      Head: Normocephalic.   Eyes:      Extraocular Movements: Extraocular movements intact.   Cardiovascular:      Rate and Rhythm: Normal rate and regular rhythm.      Comments: LVAD hum is smooth  Pulmonary:      Effort: Pulmonary effort is normal.      Breath sounds: Normal breath sounds.   Abdominal:      General: Abdomen is flat.      Palpations: Abdomen is soft.   Skin:     General: Skin is warm and dry.   Neurological:      General: No focal deficit present.         Significant Labs:  BMP:   Recent Labs   Lab 09/08/23 0623   *      K 4.2      CO2 28   BUN 11   CREATININE 1.1   CALCIUM 9.4   MG 1.7     CBC:   Recent Labs   Lab 09/08/23 0623   WBC 8.26   RBC 3.88*   HGB 10.3*   HCT 34.0*      MCV 88   MCH 26.5*   MCHC 30.3*     CMP:   Recent Labs   Lab 09/08/23 0623   *   CALCIUM 9.4   ALBUMIN 2.8*   PROT 7.4      K 4.2   CO2 28      BUN 11   CREATININE 1.1   ALKPHOS 99   ALT <5*   AST 19   BILITOT 0.3     Coagulation:   Recent Labs   Lab 09/08/23 0623   INR 1.6*   APTT 40.3*       Significant Diagnostics:  I have reviewed and interpreted all pertinent imaging results/findings within the past 24 hours.    Procedure: Device Interrogation Including analysis of device parameters  Current Settings: Ventricular Assist Device  Review of device function is stable      9/8/2023    11:14 AM 9/8/2023     9:24 AM 9/8/2023     6:03 AM 9/7/2023    11:30 PM 9/7/2023     8:16 PM 9/7/2023     4:00 PM 9/7/2023    12:00 PM   TXP LVAD INTERROGATIONS   Type HeartMate3 HeartMate3 HeartMate3 HeartMate3 HeartMate3 HeartMate3 HeartMate3   Flow 4.1 4.1 4.1 4.1 4.1 3.7 3.7   Speed 5100 5100 5100 5100 5100 5100 5100   PI 5.2 5.5 4.3 4.3 4.3 5.6 5.8   Power (Serna) 3.7 3.7 3.7 3.7 3.7 3.7 3.9    LSL  4700 4700 4700 4700 4700 4700   Pulsatility   Intermittent pulse Intermittent pulse Intermittent pulse  Intermittent pulse         Assessment/Plan:     LVAD (left ventricular assist device) present  - Admitted for Left MCA stroke  - Implant Date: 6/29/2022 with Dr. Paige  - Speed: 5100  - Low Flow Events: None overnight  - Interval History was obtained from team member during rounding today.  - VAD/ANABELLE teaching was performed with patient.  - Mobilization/Physical Therapy is ongoing.  - Anticoagulation: Coumadin/Heparin  - INR: 1.6  - Urine Output: 2650  - BUN: 11   - Creat: 1.1  - LDH: 239  - Awake and alert in bed during assessment  - Awaiting therapeutic INR  - Feels like he is back to baseline    More than 50 percent of the care dominated counseling and coordinating care with different team members. The VAD was interrogated and no significant findings were found in the history. All these findings are documented in the note above.        Vandana Coleman, LUCÍA  Cardiothoracic Surgery  [facility]

## 2023-09-08 NOTE — ASSESSMENT & PLAN NOTE
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  -Coumadin held intially due to concern for petechial hemorrhage. Repeat CT stable so started on heparin bridge.Coumadin resumed. Goal INR 2.0-3.0. INR subtherapeutic today at 1.6. Trend daily.    -LDH is stable.  Will continue to monitor daily  -Speed set at 5100, LSL 4700 rpm  -No Low flow alarms noted        Procedure: Device Interrogation Including analysis of device parameters  Current Settings: Ventricular Assist Device  Review of device function is stable        9/8/2023     9:24 AM 9/8/2023     6:03 AM 9/7/2023    11:30 PM 9/7/2023     8:16 PM 9/7/2023     4:00 PM 9/7/2023    12:00 PM 9/7/2023     8:00 AM   TXP LVAD INTERROGATIONS   Type HeartMate3 HeartMate3 HeartMate3 HeartMate3 HeartMate3 HeartMate3 HeartMate3   Flow 4.1 4.1 4.1 4.1 3.7 3.7 3.7   Speed 5100 5100 5100 5100 5100 5100 5100   PI 5.5 4.3 4.3 4.3 5.6 5.8 6   Power (Serna) 3.7 3.7 3.7 3.7 3.7 3.9 3.9   LSL 4700 4700 4700 4700 4700 4700 4700   Pulsatility  Intermittent pulse Intermittent pulse Intermittent pulse  Intermittent pulse Intermittent pulse

## 2023-09-08 NOTE — PT/OT/SLP PROGRESS
Physical Therapy Treatment/Discharge Note    Patient Name:  Kevan Queen   MRN:  [id number]    Recommendations:     Discharge Recommendations: home  Discharge Equipment Recommendations: none  Barriers to discharge: None    Assessment:     Kevan Queen is a 38 y.o. male admitted with a medical diagnosis of Embolic stroke involving left middle cerebral artery.  He presents with the following impairments/functional limitations:  (pt has no rehab needs.) pt tolerated treatment well and does not needs skilled PT. Pt is safe to ambulate on unit with RN staff. Pt will be able to discharge home with no needs when medically stable.     Rehab Prognosis: Good; patient would benefit from acute skilled PT services to address these deficits and reach maximum level of function.    Recent Surgery: * No surgery found *      Plan:     During this hospitalization, patient to be seen  (pt is being discharged from PT) to address the identified rehab impairments via  (pt is safe to gait train with RN staff.) and progress toward the following goals:    Plan of Care Expires:  09/08/23    Subjective     Chief Complaint: pt c/o pain in L flank   Patient/Family Comments/goals: to get better and go home.   Pain/Comfort:  Pain Rating 1: 8/10 (L flank)  Pain Addressed 1: Reposition, Distraction  Pain Rating Post-Intervention 1: 8/10 (L flank)      Objective:     Communicated with nurse  prior to session.  Patient found supine with PICC line, LVAD, telemetry upon PT entry to room.     General Precautions: Standard, LVAD, fall  Orthopedic Precautions: N/A  Braces: N/A  Respiratory Status: Room air     Functional Mobility:  Bed Mobility:     Rolling Left:  modified independence  Supine to Sit: modified independence    Transfers:     Sit to Stand:  supervision with no AD    Gait: pt received gait training ~ 550 ft with SBA. Pt had emergency bag intact.     Stairs:  Pt ascended/descended 3 stair(s) with No Assistive Device with right handrail with  Stand-by Assistance.     Pt white board updated with current therapists name and level of mobility assistance needed.         AM-PAC 6 CLICK MOBILITY  Turning over in bed (including adjusting bedclothes, sheets and blankets)?: 4  Sitting down on and standing up from a chair with arms (e.g., wheelchair, bedside commode, etc.): 4  Moving from lying on back to sitting on the side of the bed?: 4  Moving to and from a bed to a chair (including a wheelchair)?: 4  Need to walk in hospital room?: 3  Climbing 3-5 steps with a railing?: 3  Basic Mobility Total Score: 22       Treatment & Education:  Pt received verbal instructions in PT POC. Pt verbally expressed understanding of such.     Patient left supine with all lines intact, call button in reach, and RN  present..    GOALS:   Multidisciplinary Problems       Physical Therapy Goals       Not on file              Multidisciplinary Problems (Resolved)          Problem: Physical Therapy    Goal Priority Disciplines Outcome Goal Variances Interventions   Physical Therapy Goal   (Resolved)     PT, PT/OT Met     Description: Goals to be met by: 23     Patient will increase functional independence with mobility by performin. Supine to sit with Modified [place] -met   2. Sit to stand transfer with Supervision - met   3. Gait  x 250 feet with Stand-by Assistance using AD if needed. -  met   4. Pt transfer bed to bedside chair with SBA. . -  met                          Time Tracking:     PT Received On: 23  PT Start Time: 1321     PT Stop Time: 1329  PT Total Time (min): 8 min     Billable Minutes: Gait Training 8 min     Treatment Type: Treatment (Discharge)  PT/PTA: PT     Number of PTA visits since last PT visit: 0     2023

## 2023-09-08 NOTE — PROGRESS NOTES
Patient was seen today in the hospital. Patient was issued new shower bags. Patient was informed not to use them while in the hospital. Patient was informed to bring his MPU and UBC with him to his next clinic visit for maintenance. Patient has no other equipment needs at this time.    Equipment Issued:  Shower bags: [id number]

## 2023-09-08 NOTE — PROGRESS NOTES
Rounded on patient at bedside. Patient is A&Ox 4. LVAD numbers WNL compared to baseline. Pt denies any needs at this time.  Patient reported having 1 box of dressing supplies at home for discharge. Patient reported some equipment needs. He needs shower bags for home use as he thinks he only has one of his bags. Explained we will try and get some before he leaves. Encouraged pt to notify nurse if they have any questions, problems or concerns for LVAD coordinator.  Pt verbalizes understanding. Pt verbalized understanding and in agreement of plan. Will continue to round on patinet.

## 2023-09-08 NOTE — PLAN OF CARE
Problem: Occupational Therapy  Goal: Occupational Therapy Goal  Description: Goals to be met by: 9/22/2023     Patient will increase functional independence with ADLs by performing:    UE Dressing with Set-up Assistance.  Grooming while standing at sink with Modified [place].  Toileting from toilet with Modified [place] for hygiene and clothing management.   Stand pivot transfers with Modified [place].  Toilet transfer to toilet with Modified [place].    Outcome: Met

## 2023-09-08 NOTE — PROGRESS NOTES
09/08/2023  Mirela Perez    Current provider:  Marlo Marques MD    Device interrogation:      9/8/2023    11:14 AM 9/8/2023     9:24 AM 9/8/2023     6:03 AM 9/7/2023    11:30 PM 9/7/2023     8:16 PM 9/7/2023     4:00 PM 9/7/2023    12:00 PM   TXP LVAD INTERROGATIONS   Type HeartMate3 HeartMate3 HeartMate3 HeartMate3 HeartMate3 HeartMate3 HeartMate3   Flow 4.1 4.1 4.1 4.1 4.1 3.7 3.7   Speed 5100 5100 5100 5100 5100 5100 5100   PI 5.2 5.5 4.3 4.3 4.3 5.6 5.8   Power (Serna) 3.7 3.7 3.7 3.7 3.7 3.7 3.9   LSL  4700 4700 4700 4700 4700 4700   Pulsatility   Intermittent pulse Intermittent pulse Intermittent pulse  Intermittent pulse          Rounded on Kevan Queen to ensure all mechanical assist device settings (IABP or VAD) were appropriate and all parameters were within limits.  I was able to ensure all back up equipment was present, the staff had no issues, and the Perfusion Department daily rounding was complete.      For implantable VADs: Interrogation of Ventricular assist device was performed with analysis of device parameters and review of device function. I have personally reviewed the interrogation findings and agree with findings as stated.     In emergency, the nursing units have been notified to contact the perfusion department either by:  Calling [phone] from 630am to 4pm Mon thru Fri, utilizing the On-Call Finder functionality of Epic and searching for Perfusion, or by contacting the hospital  from 4pm to 630am and on weekends and asking to speak with the perfusionist on call.    11:54 AM

## 2023-09-08 NOTE — ASSESSMENT & PLAN NOTE
- Admitted for Left MCA stroke  - Implant Date: 6/29/2022 with Dr. Paige  - Speed: 5100  - Low Flow Events: None overnight  - Interval History was obtained from team member during rounding today.  - VAD/ANABELLE teaching was performed with patient.  - Mobilization/Physical Therapy is ongoing.  - Anticoagulation: Coumadin/Heparin  - INR: 1.6  - Urine Output: 2650  - BUN: 11   - Creat: 1.1  - LDH: 239  - Awake and alert in bed during assessment  - Awaiting therapeutic INR  - Feels like he is back to baseline    More than 50 percent of the care dominated counseling and coordinating care with different team members. The VAD was interrogated and no significant findings were found in the history. All these findings are documented in the note above.

## 2023-09-08 NOTE — PLAN OF CARE
Problem: Adult Inpatient Plan of Care  Goal: Plan of Care Review  Outcome: Ongoing, Progressing  Goal: Patient-Specific Goal (Individualized)  Outcome: Ongoing, Progressing  Goal: Absence of Hospital-Acquired Illness or Injury  Outcome: Ongoing, Progressing  Goal: Optimal Comfort and Wellbeing  Outcome: Ongoing, Progressing     Problem: Diabetes Comorbidity  Goal: Blood Glucose Level Within Targeted Range  Outcome: Ongoing, Progressing     Problem: Glycemic Control Impaired (Sepsis/Septic Shock)  Goal: Blood Glucose Level Within Desired Range  Outcome: Ongoing, Progressing     Problem: Infection Progression (Sepsis/Septic Shock)  Goal: Absence of Infection Signs and Symptoms  Outcome: Ongoing, Progressing     Problem: Nutrition Impaired (Sepsis/Septic Shock)  Goal: Optimal Nutrition Intake  Outcome: Ongoing, Progressing     Problem: Infection  Goal: Absence of Infection Signs and Symptoms  Outcome: Ongoing, Progressing     Problem: Skin Injury Risk Increased  Goal: Skin Health and Integrity  Outcome: Ongoing, Progressing     Problem: Adjustment to Illness (Stroke, Ischemic/Transient Ischemic Attack)  Goal: Optimal Coping  Outcome: Ongoing, Progressing     Problem: Cerebral Tissue Perfusion (Stroke, Ischemic/Transient Ischemic Attack)  Goal: Optimal Cerebral Tissue Perfusion  Outcome: Ongoing, Progressing     Problem: Cognitive Impairment (Stroke, Ischemic/Transient Ischemic Attack)  Goal: Optimal Cognitive Function  Outcome: Ongoing, Progressing     Problem: Communication Impairment (Stroke, Ischemic/Transient Ischemic Attack)  Goal: Improved Communication Skills  Outcome: Ongoing, Progressing     Problem: Functional Ability Impaired (Stroke, Ischemic/Transient Ischemic Attack)  Goal: Optimal Functional Ability  Outcome: Ongoing, Progressing     Problem: Respiratory Compromise (Stroke, Ischemic/Transient Ischemic Attack)  Goal: Effective Oxygenation and Ventilation  Outcome: Ongoing, Progressing     Problem:  Sensorimotor Impairment (Stroke, Ischemic/Transient Ischemic Attack)  Goal: Improved Sensorimotor Function  Outcome: Ongoing, Progressing     Problem: Urinary Elimination Impaired (Stroke, Ischemic/Transient Ischemic Attack)  Goal: Effective Urinary Elimination  Outcome: Ongoing, Progressing     Problem: Fall Injury Risk  Goal: Absence of Fall and Fall-Related Injury  Outcome: Ongoing, Progressing     Problem: Adjustment to Device (Ventricular Assist Device)  Goal: Optimal Adjustment to Device  Outcome: Ongoing, Progressing     Problem: Embolism (Ventricular Assist Device)  Goal: Absence of Embolism Signs and Symptoms  Outcome: Ongoing, Progressing     Problem: Hemodynamic Instability (Ventricular Assist Device)  Goal: Optimal Blood Flow  Outcome: Ongoing, Progressing     Problem: Infection (Ventricular Assist Device)  Goal: Absence of Infection Signs and Symptoms  Outcome: Ongoing, Progressing     Problem: Right-Sided Heart Compromise (Ventricular Assist Device)  Goal: Effective Right-Sided Heart Function  Outcome: Ongoing, Progressing

## 2023-09-08 NOTE — CARE UPDATE
-Glucose Goal 140-180    -A1C:   Hemoglobin A1C   Date Value Ref Range Status   08/31/2023 6.9 (H) 4.0 - 5.6 % Final     Comment:     ADA Screening Guidelines:  5.7-6.4%  Consistent with prediabetes  >or=6.5%  Consistent with diabetes    High levels of fetal hemoglobin interfere with the HbA1C  assay. Heterozygous hemoglobin variants (HbS, HgC, etc)do  not significantly interfere with this assay.   However, presence of multiple variants may affect accuracy.           -HOME REGIMEN:   -Levemir 22 units BID.   -Novolog 16 units TIDWM in addition to the following correction scale:     150 - 200 + 1 unit     201 - 250 + 2 units     251 - 300 + 3 units     301 - 350 + 4 units      > 350   + 5 units    -GLUCOSE TREND FOR THE PAST 24HRS:   Recent Labs   Lab 09/07/23  1152 09/07/23  1708 09/07/23  1906 09/07/23  2154 09/08/23  0813 09/08/23  1255   POCTGLUCOSE 206* 180* 249* 187* 188* 200*         -NO HYPOGYCEMIAS NOTED     - Diet  Diet Cardiac Double Portions; Fluid - 1500mL; Thin    T2DM.  LVAD.  BG at or above goal with scheduled insulin and prn correction scale.     Plan:   - increase to Levemir (Insulin Detemir) to 8 units BID  - consider starting scheduled novolog tomorrow.   - Novolog (Insulin Aspart) prn for BG excursions [facility] SSI (150/25)  - BG checks ac/hs   - Hypoglycemia protocol in place      ** Please notify Endocrine for any change and/or advance in diet**  ** Please call Endocrine for any BG related issues **     Discharge Planning:   TBD. Please notify endocrinology prior to discharge.

## 2023-09-08 NOTE — PT/OT/SLP PROGRESS
Speech Language Pathology Treatment    Patient Name:  Kevan Queen   MRN:  [id number]  Admitting Diagnosis: Embolic stroke involving left middle cerebral artery    Recommendations:                 General Recommendations:  Follow-up not indicated  Diet recommendations:  Regular, Liquid Diet Level: Thin   Aspiration Precautions: Standard aspiration precautions   General Precautions: Standard, LVAD  Communication strategies:  none    Assessment:     Kevan Queen is a 38 y.o. male with a functional swallow for a regular diet/thin liquids and with functional communication to express his wants and needs. No ongoing SLP services needed at this time. Re-consult if needed.   Subjective     Pt seen awake and alert upon SLP entry.        Pain/Comfort:  Pain Rating 1: 0/10    Respiratory Status: Room air    Objective:     Has the patient been evaluated by SLP for swallowing?   Yes  Keep patient NPO? No   Current Respiratory Status:    Room air    Pt seen for ongoing speech/language treatment. Pt stating improvements in word finding difficulties/hesitations this date. Through casual conversational speech, pt with no word finding deficits noted. Cookie theft picture given and pt able to name all aspects of the picture appropriately. D/w pt SLP role and d/c from SLP services. Pt verbalizing understanding and in agreement stating that he is at his baseline for cognitive-linguistic abilities.     Goals:   Multidisciplinary Problems       SLP Goals          Problem: SLP    Goal Priority Disciplines Outcome   SLP Goal     SLP Ongoing, Progressing   Description: Short Term Goals:   1.  Pt will complete delayed recall tasks with 80% accuracy with minimal verbal cues.  2. Pt will complete functional sequencing tasks with 80% accuracy with minimal verbal cues. .   3.Pt will complete functional safety problem solving tasks with 80% accuracy with minimal verbal cues.   4. Pt will participate in ongoing language/cognitive diagnostic  treatment to help guide POC.                           Plan:      Plan of Care reviewed with:  patient, other (see comments) (Nursing)   SLP Follow-Up:  No       Discharge recommendations:  other (see comments)   Barriers to Discharge:  None    Time Tracking:     SLP Treatment Date:   09/08/23  Speech Start Time:  1345  Speech Stop Time:  1351     Speech Total Time (min):  6 min    Billable Minutes: Speech Therapy Individual 6    09/08/2023

## 2023-09-08 NOTE — PLAN OF CARE
Plan of care discussed with patient. Fall precautions in place. LVAD DP and numbers WNL, smooth LVAD hum. Milrinone, Heparin and Cefazolin gtts maintained. INR=1.6 today. Patient has no complaints of pain. Patient has no questions at this time. Will continue to monitor.

## 2023-09-08 NOTE — PT/OT/SLP PROGRESS
"Occupational Therapy   Treatment and discharge    Name: Kevan Queen  MRN: [id number]  Admitting Diagnosis:  Embolic stroke involving left middle cerebral artery       Recommendations:     Discharge Recommendations: other (see comments)  Discharge Equipment Recommendations:  none  Barriers to discharge:  None    Assessment:     Kevan Queen is a 38 y.o. male with a medical diagnosis of Embolic stroke involving left middle cerebral artery.  He presents with completion of all ADLs and completing community level functional mobility. Patient is at PLOF and no longer in need of acute therapy services.    Rehab Prognosis:  Good; patient would benefit from acute skilled OT services to address these deficits and reach maximum level of function.       Plan:     Patient to be seen 3 x/week to address the above listed problems via self-care/home management, therapeutic activities, therapeutic exercises, neuromuscular re-education  Plan of Care Expires:    Plan of Care Reviewed with: patient    Subjective     Chief Complaint: none verbalized  Patient/Family Comments/goals: "I know it sounds weird but I feel better than I did before my stroke"  Pain/Comfort:  Pain Rating 1: 0/10  Pain Rating Post-Intervention 1: 0/10    Objective:     Communicated with: nursing prior to session.  Patient found up in chair with PICC line, telemetry, LVAD upon OT entry to room.    General Precautions: Standard, fall, LVAD    Orthopedic Precautions:N/A  Braces: N/A  Respiratory Status: Room air     Occupational Performance:     Bed Mobility:  not completed during session     Functional Mobility/Transfers:  Patient completed Sit <> Stand Transfer with modified independence  with  no assistive device   Functional Mobility: patient ambulated community level distances (~300 ft) with no AD. IV pole in tow and lines remained intact. No SOB or LOB noted.     Activities of Daily Living: patient reports independence with ADLs; does not have outside clothes " to change into  Upper Body Dressing: modified independence to adjust gown      [facility] 6 Click ADL: 24    Treatment & Education:  Pt seen for education with LVAD management to promote independence. Patient reports he stays on battery power (patient has been educated appropriately).   Check battery charge: independent  Ensuring lines untangled: independent  Controller in front :  required cueing  Bringing emergency bag: required cueing      Patient educated on:   -purpose of OT and OT POC  -facilitation and education on proper body mechanics, energy conservation, and safety  -importance of early mobility and out of bed activities with staff assist  -overall benefits of therapy     All questions answered within OT scope and to patient's satisfaction    Patient left sitting edge of bed with all lines intact and call button in reach    GOALS:   Multidisciplinary Problems       Occupational Therapy Goals          Problem: Occupational Therapy    Goal Priority Disciplines Outcome Interventions   Occupational Therapy Goal     OT, PT/OT Ongoing, Progressing    Description: Goals to be met by: 9/22/2023     Patient will increase functional independence with ADLs by performing:    UE Dressing with Set-up Assistance.  Grooming while standing at sink with Modified [place].  Toileting from toilet with Modified [place] for hygiene and clothing management.   Stand pivot transfers with Modified [place].  Toilet transfer to toilet with Modified [place].                         Time Tracking:     OT Date of Treatment: 09/08/23  OT Start Time: 1503  OT Stop Time: 1522  OT Total Time (min): 19 min    Billable Minutes:Therapeutic Activity 19    OT/MARTÍNEZ: OT          9/8/2023

## 2023-09-09 LAB
ALBUMIN SERPL BCP-MCNC: 2.9 G/DL (ref 3.5–5.2)
ALP SERPL-CCNC: 102 U/L (ref 55–135)
ALT SERPL W/O P-5'-P-CCNC: <5 U/L (ref 10–44)
ANION GAP SERPL CALC-SCNC: 10 MMOL/L (ref 8–16)
APTT PPP: 47.5 SEC (ref 21–32)
AST SERPL-CCNC: 18 U/L (ref 10–40)
BASOPHILS # BLD AUTO: 0.03 K/UL (ref 0–0.2)
BASOPHILS NFR BLD: 0.3 % (ref 0–1.9)
BILIRUB SERPL-MCNC: 0.3 MG/DL (ref 0.1–1)
BUN SERPL-MCNC: 17 MG/DL (ref 6–20)
CALCIUM SERPL-MCNC: 9.5 MG/DL (ref 8.7–10.5)
CHLORIDE SERPL-SCNC: 103 MMOL/L (ref 95–110)
CO2 SERPL-SCNC: 25 MMOL/L (ref 23–29)
CREAT SERPL-MCNC: 1.2 MG/DL (ref 0.5–1.4)
DIFFERENTIAL METHOD: ABNORMAL
EOSINOPHIL # BLD AUTO: 0.2 K/UL (ref 0–0.5)
EOSINOPHIL NFR BLD: 1.6 % (ref 0–8)
ERYTHROCYTE [DISTWIDTH] IN BLOOD BY AUTOMATED COUNT: 18.9 % (ref 11.5–14.5)
EST. GFR  (NO RACE VARIABLE): >60 ML/MIN/1.73 M^2
GLUCOSE SERPL-MCNC: 275 MG/DL (ref 70–110)
HCT VFR BLD AUTO: 33.4 % (ref 40–54)
HGB BLD-MCNC: 10.4 G/DL (ref 14–18)
IMM GRANULOCYTES # BLD AUTO: 0.02 K/UL (ref 0–0.04)
IMM GRANULOCYTES NFR BLD AUTO: 0.2 % (ref 0–0.5)
INR PPP: 1.5 (ref 0.8–1.2)
LDH SERPL L TO P-CCNC: 239 U/L (ref 110–260)
LYMPHOCYTES # BLD AUTO: 1.8 K/UL (ref 1–4.8)
LYMPHOCYTES NFR BLD: 18.8 % (ref 18–48)
MAGNESIUM SERPL-MCNC: 1.6 MG/DL (ref 1.6–2.6)
MCH RBC QN AUTO: 26.3 PG (ref 27–31)
MCHC RBC AUTO-ENTMCNC: 31.1 G/DL (ref 32–36)
MCV RBC AUTO: 85 FL (ref 82–98)
MONOCYTES # BLD AUTO: 0.9 K/UL (ref 0.3–1)
MONOCYTES NFR BLD: 9.1 % (ref 4–15)
NEUTROPHILS # BLD AUTO: 6.6 K/UL (ref 1.8–7.7)
NEUTROPHILS NFR BLD: 70 % (ref 38–73)
NRBC BLD-RTO: 0 /100 WBC
PHOSPHATE SERPL-MCNC: 3.1 MG/DL (ref 2.7–4.5)
PLATELET # BLD AUTO: 340 K/UL (ref 150–450)
PMV BLD AUTO: 9.6 FL (ref 9.2–12.9)
POCT GLUCOSE: 143 MG/DL (ref 70–110)
POCT GLUCOSE: 190 MG/DL (ref 70–110)
POCT GLUCOSE: 199 MG/DL (ref 70–110)
POCT GLUCOSE: 231 MG/DL (ref 70–110)
POCT GLUCOSE: 265 MG/DL (ref 70–110)
POTASSIUM SERPL-SCNC: 4.3 MMOL/L (ref 3.5–5.1)
PROT SERPL-MCNC: 7.6 G/DL (ref 6–8.4)
PROTHROMBIN TIME: 16 SEC (ref 9–12.5)
RBC # BLD AUTO: 3.95 M/UL (ref 4.6–6.2)
SODIUM SERPL-SCNC: 138 MMOL/L (ref 136–145)
WBC # BLD AUTO: 9.47 K/UL (ref 3.9–12.7)

## 2023-09-09 PROCEDURE — 99233 PR SUBSEQUENT HOSPITAL CARE,LEVL III: ICD-10-PCS | Mod: FS,,, | Performed by: PHYSICIAN ASSISTANT

## 2023-09-09 PROCEDURE — 83735 ASSAY OF MAGNESIUM: CPT | Performed by: INTERNAL MEDICINE

## 2023-09-09 PROCEDURE — 83615 LACTATE (LD) (LDH) ENZYME: CPT | Performed by: PHYSICIAN ASSISTANT

## 2023-09-09 PROCEDURE — 25000003 PHARM REV CODE 250: Performed by: REGISTERED NURSE

## 2023-09-09 PROCEDURE — 85610 PROTHROMBIN TIME: CPT | Performed by: INTERNAL MEDICINE

## 2023-09-09 PROCEDURE — 99233 SBSQ HOSP IP/OBS HIGH 50: CPT | Mod: FS,,, | Performed by: PHYSICIAN ASSISTANT

## 2023-09-09 PROCEDURE — 63600175 PHARM REV CODE 636 W HCPCS: Performed by: NURSE PRACTITIONER

## 2023-09-09 PROCEDURE — 63600175 PHARM REV CODE 636 W HCPCS: Performed by: PHYSICIAN ASSISTANT

## 2023-09-09 PROCEDURE — 25000003 PHARM REV CODE 250: Performed by: PHYSICIAN ASSISTANT

## 2023-09-09 PROCEDURE — 85025 COMPLETE CBC W/AUTO DIFF WBC: CPT | Performed by: INTERNAL MEDICINE

## 2023-09-09 PROCEDURE — 25000003 PHARM REV CODE 250: Performed by: INTERNAL MEDICINE

## 2023-09-09 PROCEDURE — 85730 THROMBOPLASTIN TIME PARTIAL: CPT | Performed by: INTERNAL MEDICINE

## 2023-09-09 PROCEDURE — 80053 COMPREHEN METABOLIC PANEL: CPT | Performed by: INTERNAL MEDICINE

## 2023-09-09 PROCEDURE — 27000248 HC VAD-ADDITIONAL DAY

## 2023-09-09 PROCEDURE — 93750 PR INTERROGATE VENT ASSIST DEV, IN PERSON, W PHYSICIAN ANALYSIS: ICD-10-PCS | Mod: ,,, | Performed by: INTERNAL MEDICINE

## 2023-09-09 PROCEDURE — 20600001 HC STEP DOWN PRIVATE ROOM

## 2023-09-09 PROCEDURE — 93750 INTERROGATION VAD IN PERSON: CPT | Mod: ,,, | Performed by: INTERNAL MEDICINE

## 2023-09-09 PROCEDURE — 84100 ASSAY OF PHOSPHORUS: CPT | Performed by: INTERNAL MEDICINE

## 2023-09-09 RX ORDER — MAGNESIUM SULFATE HEPTAHYDRATE 40 MG/ML
2 INJECTION, SOLUTION INTRAVENOUS ONCE
Status: COMPLETED | OUTPATIENT
Start: 2023-09-09 | End: 2023-09-09

## 2023-09-09 RX ORDER — INSULIN ASPART 100 [IU]/ML
4 INJECTION, SOLUTION INTRAVENOUS; SUBCUTANEOUS
Status: DISCONTINUED | OUTPATIENT
Start: 2023-09-09 | End: 2023-09-10

## 2023-09-09 RX ADMIN — BUSPIRONE HYDROCHLORIDE 10 MG: 10 TABLET ORAL at 08:09

## 2023-09-09 RX ADMIN — ASPIRIN 81 MG 81 MG: 81 TABLET ORAL at 08:09

## 2023-09-09 RX ADMIN — LEVETIRACETAM 1000 MG: 500 TABLET, FILM COATED ORAL at 10:09

## 2023-09-09 RX ADMIN — INSULIN ASPART 4 UNITS: 100 INJECTION, SOLUTION INTRAVENOUS; SUBCUTANEOUS at 12:09

## 2023-09-09 RX ADMIN — HEPARIN SODIUM 14 UNITS/KG/HR: 10000 INJECTION, SOLUTION INTRAVENOUS at 10:09

## 2023-09-09 RX ADMIN — WARFARIN SODIUM 6 MG: 3 TABLET ORAL at 04:09

## 2023-09-09 RX ADMIN — MILRINONE LACTATE IN DEXTROSE 0.25 MCG/KG/MIN: 200 INJECTION, SOLUTION INTRAVENOUS at 06:09

## 2023-09-09 RX ADMIN — INSULIN ASPART 4 UNITS: 100 INJECTION, SOLUTION INTRAVENOUS; SUBCUTANEOUS at 04:09

## 2023-09-09 RX ADMIN — CEFAZOLIN 8 G: 10 INJECTION, POWDER, FOR SOLUTION INTRAVENOUS at 02:09

## 2023-09-09 RX ADMIN — MILRINONE LACTATE IN DEXTROSE 0.25 MCG/KG/MIN: 200 INJECTION, SOLUTION INTRAVENOUS at 04:09

## 2023-09-09 RX ADMIN — PANTOPRAZOLE SODIUM 40 MG: 40 TABLET, DELAYED RELEASE ORAL at 08:09

## 2023-09-09 RX ADMIN — INSULIN ASPART 2 UNITS: 100 INJECTION, SOLUTION INTRAVENOUS; SUBCUTANEOUS at 12:09

## 2023-09-09 RX ADMIN — INSULIN ASPART 6 UNITS: 100 INJECTION, SOLUTION INTRAVENOUS; SUBCUTANEOUS at 08:09

## 2023-09-09 RX ADMIN — BUSPIRONE HYDROCHLORIDE 10 MG: 10 TABLET ORAL at 10:09

## 2023-09-09 RX ADMIN — VALSARTAN 40 MG: 40 TABLET, FILM COATED ORAL at 10:09

## 2023-09-09 RX ADMIN — VALSARTAN 40 MG: 40 TABLET, FILM COATED ORAL at 08:09

## 2023-09-09 RX ADMIN — MIRTAZAPINE 15 MG: 15 TABLET, FILM COATED ORAL at 10:09

## 2023-09-09 RX ADMIN — Medication 200 MG: at 08:09

## 2023-09-09 RX ADMIN — ATORVASTATIN CALCIUM 40 MG: 20 TABLET, FILM COATED ORAL at 08:09

## 2023-09-09 RX ADMIN — MAGNESIUM SULFATE HEPTAHYDRATE 2 G: 40 INJECTION, SOLUTION INTRAVENOUS at 08:09

## 2023-09-09 RX ADMIN — FUROSEMIDE 80 MG: 80 TABLET ORAL at 08:09

## 2023-09-09 RX ADMIN — INSULIN ASPART 1 UNITS: 100 INJECTION, SOLUTION INTRAVENOUS; SUBCUTANEOUS at 10:09

## 2023-09-09 RX ADMIN — LEVETIRACETAM 1000 MG: 500 TABLET, FILM COATED ORAL at 08:09

## 2023-09-09 RX ADMIN — HEPARIN SODIUM 14 UNITS/KG/HR: 10000 INJECTION, SOLUTION INTRAVENOUS at 06:09

## 2023-09-09 NOTE — PROGRESS NOTES
09/09/2023  Ruma Li    Current provider:  Marlo Marques MD    Device interrogation:      9/9/2023     9:46 AM 9/9/2023     4:15 AM 9/8/2023    11:31 PM 9/8/2023     8:26 PM 9/8/2023     3:57 PM 9/8/2023    11:14 AM 9/8/2023     9:24 AM   TXP LVAD INTERROGATIONS   Type HeartMate3 HeartMate3 HeartMate3 HeartMate3 HeartMate3 HeartMate3 HeartMate3   Flow 4.4 4 4.4 4.3 4.3 4.1 4.1   Speed 5100 5100 5100 5100 5100 5100 5100   PI 3.9 5.1 4.3 4.3 4.3 5.2 5.5   Power (Serna) 3.6 3.6 3.6 3.6 3.6 3.7 3.7   LSL 4700 4700 4700 4700   4700   Pulsatility  Intermittent pulse Intermittent pulse Intermittent pulse             Rounded on Kevan Queen to ensure all mechanical assist device settings (IABP or VAD) were appropriate and all parameters were within limits.  I was able to ensure all back up equipment was present, the staff had no issues, and the Perfusion Department daily rounding was complete.      For implantable VADs: Interrogation of Ventricular assist device was performed with analysis of device parameters and review of device function. I have personally reviewed the interrogation findings and agree with findings as stated.     In emergency, the nursing units have been notified to contact the perfusion department either by:  Calling [id number] from 630am to 4pm Mon thru Fri, utilizing the On-Call Finder functionality of Epic and searching for Perfusion, or by contacting the hospital  from 4pm to 630am and on weekends and asking to speak with the perfusionist on call.    11:22 AM

## 2023-09-09 NOTE — PLAN OF CARE
Plan of care discussed with patient.  Patient ambulating independently, fall precautions in place. LVAD DP and numbers WNL, smooth LVAD hum. Patient has no complaints of pain. Mg=1.6, replaced. INR=1.5 today. Heparin, milrinone and cefazolin gtts maintained. Patient has no questions at this time. Will continue to monitor.

## 2023-09-09 NOTE — SUBJECTIVE & OBJECTIVE
Interval History: No acute events overnight. No neuro deficits. INR is at 1.5. Patient feels his strength continues to be back to baseline.       Continuous Infusions:   heparin (porcine) in D5W 14 Units/kg/hr (09/09/23 0607)    milrinone 20mg/100ml D5W (200mcg/ml) 0.25 mcg/kg/min (09/09/23 0602)     Scheduled Meds:   aspirin  81 mg Oral Daily    atorvastatin  40 mg Oral Daily    busPIRone  10 mg Oral BID    ceFAZolin (ANCEF) 8 g in dextrose 5 % (D5W) 500 mL CONTINUOUS INFUSION  8 g Intravenous Q24H    furosemide  80 mg Oral Daily    insulin aspart U-100  4 Units Subcutaneous TIDWM    insulin detemir U-100  8 Units Subcutaneous BID    levETIRAcetam  1,000 mg Oral BID    magnesium oxide  200 mg Oral Daily    mirtazapine  15 mg Oral Nightly    pantoprazole  40 mg Oral Daily    polyethylene glycol  17 g Oral Daily    senna  8.6 mg Oral Daily    valsartan  40 mg Oral BID    warfarin  6 mg Oral Daily     PRN Meds:sodium chloride 0.9%, acetaminophen, dextrose 10%, dextrose 10%, glucagon (human recombinant), glucose, glucose, insulin aspart U-100    Review of patient's allergies indicates:   Allergen Reactions    Bumex [bumetanide] Hives    Torsemide Hives     Objective:     Vital Signs (Most Recent):  Temp: 97.1 °F (36.2 °C) (09/09/23 0741)  Pulse: 97 (09/09/23 0900)  Resp: 18 (09/09/23 0741)  BP: (!) 80/0 (09/09/23 0801)  SpO2: 98 % (09/09/23 0741) Vital Signs (24h Range):  Temp:  [97.1 °F (36.2 °C)-98.2 °F (36.8 °C)] 97.1 °F (36.2 °C)  Pulse:  [] 97  Resp:  [16-18] 18  SpO2:  [96 %-100 %] 98 %  BP: ()/(0-64) 80/0     Patient Vitals for the past 72 hrs (Last 3 readings):   Weight   09/09/23 0500 88 kg (194 lb 0.1 oz)       Body mass index is 24.25 kg/m².      Intake/Output Summary (Last 24 hours) at 9/9/2023 1017  Last data filed at 9/9/2023 0908  Gross per 24 hour   Intake 2396.83 ml   Output 3300 ml   Net -903.17 ml         Hemodynamic Parameters:       Telemetry: NSR        Physical Exam  Constitutional:   "     Appearance: Normal appearance.   HENT:      Head: Normocephalic and atraumatic.   Neck:      Comments: JVP is ~ midneck at 45 degrees  Cardiovascular:      Rate and Rhythm: Normal rate and regular rhythm.      Pulses: Normal pulses.      Comments: Smooth VAD hum   Pulmonary:      Effort: Pulmonary effort is normal.      Breath sounds: Normal breath sounds.   Abdominal:      General: Bowel sounds are normal.      Palpations: Abdomen is soft.   Musculoskeletal:         General: Normal range of motion.      Cervical back: Normal range of motion and neck supple.      Right lower leg: No edema.      Left lower leg: No edema.   Skin:     General: Skin is warm and dry.   Neurological:      General: No focal deficit present.      Mental Status: He is alert and oriented to person, place, and time.            Significant Labs:  CBC:  Recent Labs   Lab 09/07/23 0448 09/08/23 0623 09/09/23 0442   WBC 8.05 8.26 9.47   RBC 3.87* 3.88* 3.95*   HGB 10.4* 10.3* 10.4*   HCT 34.6* 34.0* 33.4*    332 340   MCV 89 88 85   MCH 26.9* 26.5* 26.3*   MCHC 30.1* 30.3* 31.1*       BNP:  No results for input(s): "BNP" in the last 168 hours.    Invalid input(s): "BNPTRIAGELBLO"    CMP:  Recent Labs   Lab 09/07/23 0448 09/08/23 0623 09/09/23 0442   * 198* 275*   CALCIUM 8.8 9.4 9.5   ALBUMIN 2.7* 2.8* 2.9*   PROT 7.1 7.4 7.6    139 138   K 4.2 4.2 4.3   CO2 24 28 25    104 103   BUN 11 11 17   CREATININE 1.1 1.1 1.2   ALKPHOS 100 99 102   ALT <5* <5* <5*   AST 19 19 18   BILITOT 0.4 0.3 0.3        Coagulation:   Recent Labs   Lab 09/07/23 0448 09/08/23 0623 09/09/23 0442   INR 1.3* 1.6* 1.5*   APTT 43.6* 40.3* 47.5*       LDH:  Recent Labs   Lab 09/07/23 0448 09/08/23 0623 09/09/23  0442    239 239       Microbiology:  Microbiology Results (last 7 days)       ** No results found for the last 168 hours. **            BMP:   Recent Labs   Lab 09/09/23 0442   *      K 4.3      CO2 " 25   BUN 17   CREATININE 1.2   CALCIUM 9.5   MG 1.6       I have reviewed all pertinent labs within the past 24 hours.    Estimated Creatinine Clearance: 99.8 mL/min (based on SCr of 1.2 mg/dL).    Diagnostic Results:  I have reviewed all pertinent imaging results/findings within the past 24 hours.

## 2023-09-09 NOTE — ASSESSMENT & PLAN NOTE
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  -Coumadin held intially due to concern for petechial hemorrhage. Repeat CT stable so started on heparin bridge.Coumadin resumed. Goal INR 2.0-3.0. INR subtherapeutic today at 1.5. Trend daily.    -LDH is stable.  Will continue to monitor daily  -Speed set at 5100, LSL 4700 rpm  -No Low flow alarms noted        Procedure: Device Interrogation Including analysis of device parameters  Current Settings: Ventricular Assist Device  Review of device function is stable        9/9/2023     9:46 AM 9/9/2023     4:15 AM 9/8/2023    11:31 PM 9/8/2023     8:26 PM 9/8/2023     3:57 PM 9/8/2023    11:14 AM 9/8/2023     9:24 AM   TXP LVAD INTERROGATIONS   Type HeartMate3 HeartMate3 HeartMate3 HeartMate3 HeartMate3 HeartMate3 HeartMate3   Flow 4.4 4 4.4 4.3 4.3 4.1 4.1   Speed 5100 5100 5100 5100 5100 5100 5100   PI 3.9 5.1 4.3 4.3 4.3 5.2 5.5   Power (Serna) 3.6 3.6 3.6 3.6 3.6 3.7 3.7   LSL 4700 4700 4700 4700   4700   Pulsatility  Intermittent pulse Intermittent pulse Intermittent pulse

## 2023-09-09 NOTE — CARE UPDATE
-Glucose Goal 140-180    -A1C:   Hemoglobin A1C   Date Value Ref Range Status   08/31/2023 6.9 (H) 4.0 - 5.6 % Final     Comment:     ADA Screening Guidelines:  5.7-6.4%  Consistent with prediabetes  >or=6.5%  Consistent with diabetes    High levels of fetal hemoglobin interfere with the HbA1C  assay. Heterozygous hemoglobin variants (HbS, HgC, etc)do  not significantly interfere with this assay.   However, presence of multiple variants may affect accuracy.           -HOME REGIMEN:   -Levemir 22 units BID.   -Novolog 16 units TIDWM in addition to the following correction scale:     150 - 200 + 1 unit     201 - 250 + 2 units     251 - 300 + 3 units     301 - 350 + 4 units      > 350   + 5 units    -GLUCOSE TREND FOR THE PAST 24HRS:   Recent Labs   Lab 09/07/23  2154 09/08/23  0813 09/08/23  1255 09/08/23  1556 09/08/23  2022 09/09/23  0803   POCTGLUCOSE 187* 188* 200* 173* 219* 265*           -NO HYPOGYCEMIAS NOTED     - Diet  Diet Cardiac Double Portions; Fluid - 1500mL; Thin    T2DM.  LVAD.  BG at or above goal with scheduled insulin and prn correction scale.     Plan:   - continue Levemir (Insulin Detemir) to 8 units BID  - start novolog 4 units with meals; 0.3 u/kg wt based and needs with correction scale.   - Novolog (Insulin Aspart) prn for BG excursions [facility] SSI (150/25)  - BG checks ac/hs   - Hypoglycemia protocol in place      ** Please notify Endocrine for any change and/or advance in diet**  ** Please call Endocrine for any BG related issues **     Discharge Planning:   TBD. Please notify endocrinology prior to discharge.

## 2023-09-09 NOTE — PROGRESS NOTES
[facility] - Cardiology Stepdown  Heart Transplant  Progress Note    Patient Name: Kevan Queen  MRN: [id number]  Admission Date: 8/31/2023  Hospital Length of Stay: 9 days  Attending Physician: Marlo Marques MD  Primary Care Provider: Rosio Armendariz FNP  Principal Problem:Embolic stroke involving left middle cerebral artery    Subjective:     Interval History: No acute events overnight. No neuro deficits. INR is at 1.5. Patient feels his strength continues to be back to baseline.       Continuous Infusions:   heparin (porcine) in D5W 14 Units/kg/hr (09/09/23 0607)    milrinone 20mg/100ml D5W (200mcg/ml) 0.25 mcg/kg/min (09/09/23 0602)     Scheduled Meds:   aspirin  81 mg Oral Daily    atorvastatin  40 mg Oral Daily    busPIRone  10 mg Oral BID    ceFAZolin (ANCEF) 8 g in dextrose 5 % (D5W) 500 mL CONTINUOUS INFUSION  8 g Intravenous Q24H    furosemide  80 mg Oral Daily    insulin aspart U-100  4 Units Subcutaneous TIDWM    insulin detemir U-100  8 Units Subcutaneous BID    levETIRAcetam  1,000 mg Oral BID    magnesium oxide  200 mg Oral Daily    mirtazapine  15 mg Oral Nightly    pantoprazole  40 mg Oral Daily    polyethylene glycol  17 g Oral Daily    senna  8.6 mg Oral Daily    valsartan  40 mg Oral BID    warfarin  6 mg Oral Daily     PRN Meds:sodium chloride 0.9%, acetaminophen, dextrose 10%, dextrose 10%, glucagon (human recombinant), glucose, glucose, insulin aspart U-100    Review of patient's allergies indicates:   Allergen Reactions    Bumex [bumetanide] Hives    Torsemide Hives     Objective:     Vital Signs (Most Recent):  Temp: 97.1 °F (36.2 °C) (09/09/23 0741)  Pulse: 97 (09/09/23 0900)  Resp: 18 (09/09/23 0741)  BP: (!) 80/0 (09/09/23 0801)  SpO2: 98 % (09/09/23 0741) Vital Signs (24h Range):  Temp:  [97.1 °F (36.2 °C)-98.2 °F (36.8 °C)] 97.1 °F (36.2 °C)  Pulse:  [] 97  Resp:  [16-18] 18  SpO2:  [96 %-100 %] 98 %  BP: ()/(0-64) 80/0     Patient Vitals for the past 72  "hrs (Last 3 readings):   Weight   09/09/23 0500 88 kg (194 lb 0.1 oz)       Body mass index is 24.25 kg/m².      Intake/Output Summary (Last 24 hours) at 9/9/2023 1017  Last data filed at 9/9/2023 0908  Gross per 24 hour   Intake 2396.83 ml   Output 3300 ml   Net -903.17 ml         Hemodynamic Parameters:       Telemetry: NSR        Physical Exam  Constitutional:       Appearance: Normal appearance.   HENT:      Head: Normocephalic and atraumatic.   Neck:      Comments: JVP is ~ midneck at 45 degrees  Cardiovascular:      Rate and Rhythm: Normal rate and regular rhythm.      Pulses: Normal pulses.      Comments: Smooth VAD hum   Pulmonary:      Effort: Pulmonary effort is normal.      Breath sounds: Normal breath sounds.   Abdominal:      General: Bowel sounds are normal.      Palpations: Abdomen is soft.   Musculoskeletal:         General: Normal range of motion.      Cervical back: Normal range of motion and neck supple.      Right lower leg: No edema.      Left lower leg: No edema.   Skin:     General: Skin is warm and dry.   Neurological:      General: No focal deficit present.      Mental Status: He is alert and oriented to person, place, and time.            Significant Labs:  CBC:  Recent Labs   Lab 09/07/23 0448 09/08/23 0623 09/09/23 0442   WBC 8.05 8.26 9.47   RBC 3.87* 3.88* 3.95*   HGB 10.4* 10.3* 10.4*   HCT 34.6* 34.0* 33.4*    332 340   MCV 89 88 85   MCH 26.9* 26.5* 26.3*   MCHC 30.1* 30.3* 31.1*       BNP:  No results for input(s): "BNP" in the last 168 hours.    Invalid input(s): "BNPTRIAGELBLO"    CMP:  Recent Labs   Lab 09/07/23 0448 09/08/23 0623 09/09/23 0442   * 198* 275*   CALCIUM 8.8 9.4 9.5   ALBUMIN 2.7* 2.8* 2.9*   PROT 7.1 7.4 7.6    139 138   K 4.2 4.2 4.3   CO2 24 28 25    104 103   BUN 11 11 17   CREATININE 1.1 1.1 1.2   ALKPHOS 100 99 102   ALT <5* <5* <5*   AST 19 19 18   BILITOT 0.4 0.3 0.3        Coagulation:   Recent Labs   Lab 09/07/23  0448 " 09/08/23 0623 09/09/23 0442   INR 1.3* 1.6* 1.5*   APTT 43.6* 40.3* 47.5*       LDH:  Recent Labs   Lab 09/07/23 0448 09/08/23 0623 09/09/23 0442    239 239       Microbiology:  Microbiology Results (last 7 days)       ** No results found for the last 168 hours. **            BMP:   Recent Labs   Lab 09/09/23 0442   *      K 4.3      CO2 25   BUN 17   CREATININE 1.2   CALCIUM 9.5   MG 1.6       I have reviewed all pertinent labs within the past 24 hours.    Estimated Creatinine Clearance: 99.8 mL/min (based on SCr of 1.2 mg/dL).    Diagnostic Results:  I have reviewed all pertinent imaging results/findings within the past 24 hours.    Assessment and Plan:     Mr. Kevan Thacker is a 37 y/o AAM w/ PMH of NICM (possible Familial w/ father having LVAD and subsequent transplant) s/p DT-HM3 (6/3/2022), HFrEF, chronic DLES, MSSA bacteremia c/b L empyema (maintained on ancef, maria isabel 9/14), seizures, and IDDM2 who initially presented to [facility] with aphasia and right sided weekness. He was reportedly not taking his coumadin or keppra for the past week due to running out. As per chart review, patient started to experience symptoms around 1am when he went to sleep. Symptoms worsened and EMS was called around 3:30am. Upon admission patient underwent a CT head showed an acute to subacute infarct with possible petechial hemorrhage. Labs were significant for INR of 1.2. Patient was loaded with Keppra and [place] [facility] [place].      Upon arrival, patient was found to be hemodynamically stable w/ MAPs of 106. However, he was noted to be not alert or oriented and extremely lethargic. Patient was noted to have GCS 10-11 with minimally reactive pupils. Patient underwent stat CT brain which showed no acute changes since prior CT, and transferred to the ICU for closer monitoring. Once in the ICU patient was intubated for airway protection.      Of note, patient was most recently  hospitalized from 7/22/2023-8/8/2023 for sepsis/covod. Found to have loculated pleural effussion requiring chest tube placement and three doses of lytics. Effussion improved and patient was discharged with 6 week course of Ancef (to end on 9/14).        Home Medications:    Aspirin 81mg daily   Buspirone 10mg bid   Furosemide 80mg daily   Insulin detemir 25 units bid   Insulin aspart 14 units w/ meals   Milrinone 0.25   Mirtazapine 15mg nightly   Valsartan 40mg bi   Warfarin      Cardiac Imaging:    Echo (7/31/2023): HM3 at 5100. AV does not open. IV septum midline. EF 10-15%. LV severely dilated. Normal wall thickness. Severe global hypokinesis. RV not well visualized due to poor acoustic window. Biatrial enlargment. Mod TR.        * Embolic stroke involving left middle cerebral artery  - CTH with L parietal hypodensity with subtle areas of hyperdensity concerning for acute/subacute infarct with petechial hemorrhage, CTA negative for LVO or critical stenosis. Determined not a candidate for acute interventions with thrombolytics/thrombectomy.  - continue aspirin and statin.   -On heparin and Coumadin   - PT, OT, Speech consulted  -Will continue outpatient speech therapy      Bacteremia due to Staphylococcus  -H/O chronic MSSA DLES infection for which he is on Doxycycline at home  -Blood cxs here +ve for MSSA through 7/28. Repeated 7/29 with NGTD. Repeated again 7/30 NGTD  -PICC line replaced 8/1  -ECHO done 7/31 and no vegetations appreciated   -CT 8/1 with loculated fluid along Left lateral hear border, thoracic surgery consulted, not a candidate for VATS. Chest tube placed with IR 8/3, pleural fluid cultures positive for GPC  -Continue Ancef 8g IV q 24 hours for total of 6 weeks with end date 9/14 (home health orders completed).     Seizure-like activity  - loaded with keppra in ED prior to arrival.   - EEG unremarkable.  Will continue keppra 1000mg bid as per Neuro recs   -no seizures noted overnight     On  mechanically assisted ventilation  -resolved  -Patient self extubated 9/1  -Mechanically ventillatied for airway protection in the setting of AMS w/ stroke.   - Pulmonology consulted to assist with management. Appreciate their assistance     LVAD (left ventricular assist device) present  -HeartMate 3 Implanted on 6/29/2022 as DT with RV failure on milrinone at 0.25 mcg/kg/min.  -Coumadin held intially due to concern for petechial hemorrhage. Repeat CT stable so started on heparin bridge.Coumadin resumed. Goal INR 2.0-3.0. INR subtherapeutic today at 1.5. Trend daily.    -LDH is stable.  Will continue to monitor daily  -Speed set at 5100, LSL 4700 rpm  -No Low flow alarms noted        Procedure: Device Interrogation Including analysis of device parameters  Current Settings: Ventricular Assist Device  Review of device function is stable        9/9/2023     9:46 AM 9/9/2023     4:15 AM 9/8/2023    11:31 PM 9/8/2023     8:26 PM 9/8/2023     3:57 PM 9/8/2023    11:14 AM 9/8/2023     9:24 AM   TXP LVAD INTERROGATIONS   Type HeartMate3 HeartMate3 HeartMate3 HeartMate3 HeartMate3 HeartMate3 HeartMate3   Flow 4.4 4 4.4 4.3 4.3 4.1 4.1   Speed 5100 5100 5100 5100 5100 5100 5100   PI 3.9 5.1 4.3 4.3 4.3 5.2 5.5   Power (Serna) 3.6 3.6 3.6 3.6 3.6 3.7 3.7   LSL 4700 4700 4700 4700   4700   Pulsatility  Intermittent pulse Intermittent pulse Intermittent pulse          Type 2 diabetes mellitus with hyperglycemia  - management as per endocrinology.     Acute combined systolic and diastolic congestive heart failure  -NICM s/p LVAD and on Milrinone @ 0.25   -Echo on (9/5/23): HM3 at 5100.The aortic valve does not open. septum is at midline. LV severely dilated. severely reduced systolic function with EF 10 -15%. There is normal diastolic function. RV Systolic function is normal. Mild MR, Severe TR  - Home GDMT: valsartan 40mg bid (continued)  - Home diuretic regimen: Lasix 80mg daily.  (continued)   - Ionotropes: continue home  milrinone 0.25.   - Strict I&Os and daily weights.   - Maintain K>4 and Mg>2        Jeff Spencer PA-C  Heart Transplant  [facility] - Cardiology Stepdown

## 2023-09-10 LAB
ALBUMIN SERPL BCP-MCNC: 3 G/DL (ref 3.5–5.2)
ALP SERPL-CCNC: 108 U/L (ref 55–135)
ALT SERPL W/O P-5'-P-CCNC: <5 U/L (ref 10–44)
ANION GAP SERPL CALC-SCNC: 11 MMOL/L (ref 8–16)
APTT PPP: 49.8 SEC (ref 21–32)
AST SERPL-CCNC: 20 U/L (ref 10–40)
BASOPHILS # BLD AUTO: 0.03 K/UL (ref 0–0.2)
BASOPHILS NFR BLD: 0.3 % (ref 0–1.9)
BILIRUB SERPL-MCNC: 0.3 MG/DL (ref 0.1–1)
BUN SERPL-MCNC: 16 MG/DL (ref 6–20)
CALCIUM SERPL-MCNC: 9.6 MG/DL (ref 8.7–10.5)
CHLORIDE SERPL-SCNC: 103 MMOL/L (ref 95–110)
CO2 SERPL-SCNC: 24 MMOL/L (ref 23–29)
CREAT SERPL-MCNC: 1.3 MG/DL (ref 0.5–1.4)
DIFFERENTIAL METHOD: ABNORMAL
EOSINOPHIL # BLD AUTO: 0.2 K/UL (ref 0–0.5)
EOSINOPHIL NFR BLD: 1.6 % (ref 0–8)
ERYTHROCYTE [DISTWIDTH] IN BLOOD BY AUTOMATED COUNT: 18.7 % (ref 11.5–14.5)
EST. GFR  (NO RACE VARIABLE): >60 ML/MIN/1.73 M^2
GLUCOSE SERPL-MCNC: 323 MG/DL (ref 70–110)
HCT VFR BLD AUTO: 35.2 % (ref 40–54)
HGB BLD-MCNC: 10.8 G/DL (ref 14–18)
IMM GRANULOCYTES # BLD AUTO: 0.02 K/UL (ref 0–0.04)
IMM GRANULOCYTES NFR BLD AUTO: 0.2 % (ref 0–0.5)
INR PPP: 1.8 (ref 0.8–1.2)
LDH SERPL L TO P-CCNC: 258 U/L (ref 110–260)
LYMPHOCYTES # BLD AUTO: 1.8 K/UL (ref 1–4.8)
LYMPHOCYTES NFR BLD: 16.1 % (ref 18–48)
MAGNESIUM SERPL-MCNC: 1.6 MG/DL (ref 1.6–2.6)
MCH RBC QN AUTO: 26.3 PG (ref 27–31)
MCHC RBC AUTO-ENTMCNC: 30.7 G/DL (ref 32–36)
MCV RBC AUTO: 86 FL (ref 82–98)
MONOCYTES # BLD AUTO: 1 K/UL (ref 0.3–1)
MONOCYTES NFR BLD: 8.8 % (ref 4–15)
NEUTROPHILS # BLD AUTO: 8.2 K/UL (ref 1.8–7.7)
NEUTROPHILS NFR BLD: 73 % (ref 38–73)
NRBC BLD-RTO: 0 /100 WBC
PHOSPHATE SERPL-MCNC: 3 MG/DL (ref 2.7–4.5)
PLATELET # BLD AUTO: 380 K/UL (ref 150–450)
PMV BLD AUTO: 10.1 FL (ref 9.2–12.9)
POCT GLUCOSE: 199 MG/DL (ref 70–110)
POCT GLUCOSE: 208 MG/DL (ref 70–110)
POCT GLUCOSE: 248 MG/DL (ref 70–110)
POCT GLUCOSE: 255 MG/DL (ref 70–110)
POTASSIUM SERPL-SCNC: 4.3 MMOL/L (ref 3.5–5.1)
PROT SERPL-MCNC: 7.9 G/DL (ref 6–8.4)
PROTHROMBIN TIME: 19.1 SEC (ref 9–12.5)
RBC # BLD AUTO: 4.11 M/UL (ref 4.6–6.2)
SODIUM SERPL-SCNC: 138 MMOL/L (ref 136–145)
WBC # BLD AUTO: 11.2 K/UL (ref 3.9–12.7)

## 2023-09-10 PROCEDURE — 25000003 PHARM REV CODE 250: Performed by: PHYSICIAN ASSISTANT

## 2023-09-10 PROCEDURE — 93750 INTERROGATION VAD IN PERSON: CPT | Mod: ,,, | Performed by: INTERNAL MEDICINE

## 2023-09-10 PROCEDURE — 20600001 HC STEP DOWN PRIVATE ROOM

## 2023-09-10 PROCEDURE — 63600175 PHARM REV CODE 636 W HCPCS: Performed by: PHYSICIAN ASSISTANT

## 2023-09-10 PROCEDURE — 99233 SBSQ HOSP IP/OBS HIGH 50: CPT | Mod: FS,,, | Performed by: INTERNAL MEDICINE

## 2023-09-10 PROCEDURE — 25000003 PHARM REV CODE 250: Performed by: INTERNAL MEDICINE

## 2023-09-10 PROCEDURE — 85610 PROTHROMBIN TIME: CPT | Performed by: INTERNAL MEDICINE

## 2023-09-10 PROCEDURE — 85025 COMPLETE CBC W/AUTO DIFF WBC: CPT | Performed by: INTERNAL MEDICINE

## 2023-09-10 PROCEDURE — 99233 PR SUBSEQUENT HOSPITAL CARE,LEVL III: ICD-10-PCS | Mod: FS,,, | Performed by: INTERNAL MEDICINE

## 2023-09-10 PROCEDURE — 85730 THROMBOPLASTIN TIME PARTIAL: CPT | Performed by: INTERNAL MEDICINE

## 2023-09-10 PROCEDURE — 83615 LACTATE (LD) (LDH) ENZYME: CPT | Performed by: PHYSICIAN ASSISTANT

## 2023-09-10 PROCEDURE — 93750 PR INTERROGATE VENT ASSIST DEV, IN PERSON, W PHYSICIAN ANALYSIS: ICD-10-PCS | Mod: ,,, | Performed by: INTERNAL MEDICINE

## 2023-09-10 PROCEDURE — 83735 ASSAY OF MAGNESIUM: CPT | Performed by: INTERNAL MEDICINE

## 2023-09-10 PROCEDURE — 25000003 PHARM REV CODE 250: Performed by: REGISTERED NURSE

## 2023-09-10 PROCEDURE — 27000248 HC VAD-ADDITIONAL DAY

## 2023-09-10 PROCEDURE — 84100 ASSAY OF PHOSPHORUS: CPT | Performed by: INTERNAL MEDICINE

## 2023-09-10 PROCEDURE — 80053 COMPREHEN METABOLIC PANEL: CPT | Performed by: INTERNAL MEDICINE

## 2023-09-10 RX ORDER — MAGNESIUM SULFATE HEPTAHYDRATE 40 MG/ML
2 INJECTION, SOLUTION INTRAVENOUS ONCE
Status: COMPLETED | OUTPATIENT
Start: 2023-09-10 | End: 2023-09-10

## 2023-09-10 RX ORDER — INSULIN ASPART 100 [IU]/ML
6 INJECTION, SOLUTION INTRAVENOUS; SUBCUTANEOUS
Status: DISCONTINUED | OUTPATIENT
Start: 2023-09-10 | End: 2023-09-11

## 2023-09-10 RX ADMIN — CEFAZOLIN 8 G: 10 INJECTION, POWDER, FOR SOLUTION INTRAVENOUS at 01:09

## 2023-09-10 RX ADMIN — FUROSEMIDE 80 MG: 80 TABLET ORAL at 08:09

## 2023-09-10 RX ADMIN — LEVETIRACETAM 1000 MG: 500 TABLET, FILM COATED ORAL at 08:09

## 2023-09-10 RX ADMIN — PANTOPRAZOLE SODIUM 40 MG: 40 TABLET, DELAYED RELEASE ORAL at 08:09

## 2023-09-10 RX ADMIN — INSULIN ASPART 2 UNITS: 100 INJECTION, SOLUTION INTRAVENOUS; SUBCUTANEOUS at 04:09

## 2023-09-10 RX ADMIN — BUSPIRONE HYDROCHLORIDE 10 MG: 10 TABLET ORAL at 08:09

## 2023-09-10 RX ADMIN — INSULIN ASPART 4 UNITS: 100 INJECTION, SOLUTION INTRAVENOUS; SUBCUTANEOUS at 08:09

## 2023-09-10 RX ADMIN — VALSARTAN 40 MG: 40 TABLET, FILM COATED ORAL at 09:09

## 2023-09-10 RX ADMIN — VALSARTAN 40 MG: 40 TABLET, FILM COATED ORAL at 08:09

## 2023-09-10 RX ADMIN — INSULIN ASPART 4 UNITS: 100 INJECTION, SOLUTION INTRAVENOUS; SUBCUTANEOUS at 12:09

## 2023-09-10 RX ADMIN — BUSPIRONE HYDROCHLORIDE 10 MG: 10 TABLET ORAL at 09:09

## 2023-09-10 RX ADMIN — ATORVASTATIN CALCIUM 40 MG: 20 TABLET, FILM COATED ORAL at 08:09

## 2023-09-10 RX ADMIN — ASPIRIN 81 MG 81 MG: 81 TABLET ORAL at 08:09

## 2023-09-10 RX ADMIN — INSULIN ASPART 2 UNITS: 100 INJECTION, SOLUTION INTRAVENOUS; SUBCUTANEOUS at 09:09

## 2023-09-10 RX ADMIN — INSULIN ASPART 6 UNITS: 100 INJECTION, SOLUTION INTRAVENOUS; SUBCUTANEOUS at 04:09

## 2023-09-10 RX ADMIN — Medication 200 MG: at 08:09

## 2023-09-10 RX ADMIN — LEVETIRACETAM 1000 MG: 500 TABLET, FILM COATED ORAL at 09:09

## 2023-09-10 RX ADMIN — MAGNESIUM SULFATE 2 G: 2 INJECTION INTRAVENOUS at 09:09

## 2023-09-10 RX ADMIN — MIRTAZAPINE 15 MG: 15 TABLET, FILM COATED ORAL at 09:09

## 2023-09-10 RX ADMIN — MILRINONE LACTATE IN DEXTROSE 0.25 MCG/KG/MIN: 200 INJECTION, SOLUTION INTRAVENOUS at 06:09

## 2023-09-10 RX ADMIN — WARFARIN SODIUM 6 MG: 3 TABLET ORAL at 04:09

## 2023-09-10 NOTE — ASSESSMENT & PLAN NOTE
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  -Coumadin held intially due to concern for petechial hemorrhage. Repeat CT stable so started on heparin bridge.Coumadin resumed. Goal INR 2.0-3.0. INR subtherapeutic today at 1.8. Trend daily.    -LDH is stable.  Will continue to monitor daily  -Speed set at 5100, LSL 4700 rpm  -No Low flow alarms noted        Procedure: Device Interrogation Including analysis of device parameters  Current Settings: Ventricular Assist Device  Review of device function is stable        9/10/2023     9:08 AM 9/10/2023     4:25 AM 9/10/2023    12:33 AM 9/9/2023    10:49 PM 9/9/2023     3:39 PM 9/9/2023    11:55 AM 9/9/2023     9:46 AM   TXP LVAD INTERROGATIONS   Type HeartMate3 HeartMate3 HeartMate3 HeartMate3 HeartMate3 HeartMate3 HeartMate3   Flow 4.4 4.5 4.4 4.3 4.5 4.3 4.4   Speed 5100 5100 5100 5150 5100 5100 5100   PI 3.7 3.6 3.8 4.4 4 4.9 3.9   Power (Serna) 3.7 3.7 3.7 3.7 3.7 3.7 3.6   LSL       4700

## 2023-09-10 NOTE — PLAN OF CARE
Plan of care discussed with patient.  Patient ambulating independently, fall precautions in place. LVAD DP and numbers WNL, smooth LVAD hum. Patient has no complaints of pain. INR=1.8 today. Heparin, Primacor and Ancef gtts maintained. Patient has no questions at this time. Will continue to monitor.

## 2023-09-10 NOTE — PROGRESS NOTES
09/10/2023  Ruma Li    Current provider:  Marlo Marques MD    Device interrogation:      9/10/2023     9:08 AM 9/10/2023     4:25 AM 9/10/2023    12:33 AM 9/9/2023    10:49 PM 9/9/2023     3:39 PM 9/9/2023    11:55 AM 9/9/2023     9:46 AM   TXP LVAD INTERROGATIONS   Type HeartMate3 HeartMate3 HeartMate3 HeartMate3 HeartMate3 HeartMate3 HeartMate3   Flow 4.4 4.5 4.4 4.3 4.5 4.3 4.4   Speed 5100 5100 5100 5150 5100 5100 5100   PI 3.7 3.6 3.8 4.4 4 4.9 3.9   Power (Serna) 3.7 3.7 3.7 3.7 3.7 3.7 3.6   LSL       4700          Rounded on Kevan Queen to ensure all mechanical assist device settings (IABP or VAD) were appropriate and all parameters were within limits.  I was able to ensure all back up equipment was present, the staff had no issues, and the Perfusion Department daily rounding was complete.      For implantable VADs: Interrogation of Ventricular assist device was performed with analysis of device parameters and review of device function. I have personally reviewed the interrogation findings and agree with findings as stated.     In emergency, the nursing units have been notified to contact the perfusion department either by:  Calling [phone] from 630am to 4pm Mon thru Fri, utilizing the On-Call Finder functionality of Epic and searching for Perfusion, or by contacting the hospital  from 4pm to 630am and on weekends and asking to speak with the perfusionist on call.    12:14 PM

## 2023-09-10 NOTE — PROGRESS NOTES
[facility] - [facility]iology Stepdown  Heart Transplant  Progress Note    Patient Name: Kevan Queen  MRN: [id number]  Admission Date: 8/31/2023  Hospital Length of Stay: 10 days  Attending Physician: Marlo Marques MD  Primary Care Provider: Rosio Armendariz FNP  Principal Problem:Embolic stroke involving left middle cerebral artery    Subjective:     Interval History: No acute events overnight. Patient has no complaints. No neuro deficits this AM. INR is up to 1.8. Plan to continue to boost coumadin towards therapeutic and plan for discharge tomorrow.     Continuous Infusions:   heparin (porcine) in D5W 14 Units/kg/hr (09/09/23 2246)    milrinone 20mg/100ml D5W (200mcg/ml) 0.25 mcg/kg/min (09/10/23 0604)     Scheduled Meds:   aspirin  81 mg Oral Daily    atorvastatin  40 mg Oral Daily    busPIRone  10 mg Oral BID    ceFAZolin (ANCEF) 8 g in dextrose 5 % (D5W) 500 mL CONTINUOUS INFUSION  8 g Intravenous Q24H    furosemide  80 mg Oral Daily    insulin aspart U-100  4 Units Subcutaneous TIDWM    insulin detemir U-100  8 Units Subcutaneous BID    levETIRAcetam  1,000 mg Oral BID    magnesium oxide  200 mg Oral Daily    magnesium sulfate IVPB  2 g Intravenous Once    mirtazapine  15 mg Oral Nightly    pantoprazole  40 mg Oral Daily    polyethylene glycol  17 g Oral Daily    senna  8.6 mg Oral Daily    valsartan  40 mg Oral BID    warfarin  6 mg Oral Daily     PRN Meds:sodium chloride 0.9%, acetaminophen, dextrose 10%, dextrose 10%, glucagon (human recombinant), glucose, glucose, insulin aspart U-100    Review of patient's allergies indicates:   Allergen Reactions    Bumex [bumetanide] Hives    Torsemide Hives     Objective:     Vital Signs (Most Recent):  Temp: 97.1 °F (36.2 °C) (09/10/23 0718)  Pulse: 99 (09/10/23 0718)  Resp: 17 (09/10/23 0718)  BP: 114/69 (09/10/23 0718)  SpO2: 98 % (09/10/23 0718) Vital Signs (24h Range):  Temp:  [97.1 °F (36.2 °C)-99 °F (37.2 °C)] 97.1 °F (36.2 °C)  Pulse:  []  "99  Resp:  [17-18] 17  SpO2:  [98 %] 98 %  BP: ()/(0-69) 114/69     Patient Vitals for the past 72 hrs (Last 3 readings):   Weight   09/10/23 0425 94.4 kg (208 lb 1.8 oz)   09/09/23 0500 88 kg (194 lb 0.1 oz)       Body mass index is 26.01 kg/m².      Intake/Output Summary (Last 24 hours) at 9/10/2023 1054  Last data filed at 9/10/2023 0903  Gross per 24 hour   Intake 945.71 ml   Output 2775 ml   Net -1829.29 ml         Hemodynamic Parameters:       Telemetry: NSR        Physical Exam  Constitutional:       Appearance: Normal appearance.   HENT:      Head: Normocephalic and atraumatic.   Neck:      Comments: JVP is ~ midneck at 45 degrees  Cardiovascular:      Rate and Rhythm: Normal rate and regular rhythm.      Pulses: Normal pulses.      Comments: Smooth VAD hum   Pulmonary:      Effort: Pulmonary effort is normal.      Breath sounds: Normal breath sounds.   Abdominal:      General: Bowel sounds are normal.      Palpations: Abdomen is soft.   Musculoskeletal:         General: Normal range of motion.      Cervical back: Normal range of motion and neck supple.      Right lower leg: No edema.      Left lower leg: No edema.   Skin:     General: Skin is warm and dry.   Neurological:      General: No focal deficit present.      Mental Status: He is alert and oriented to person, place, and time.            Significant Labs:  CBC:  Recent Labs   Lab 09/08/23  0623 09/09/23  0442 09/10/23  0546   WBC 8.26 9.47 11.20   RBC 3.88* 3.95* 4.11*   HGB 10.3* 10.4* 10.8*   HCT 34.0* 33.4* 35.2*    340 380   MCV 88 85 86   MCH 26.5* 26.3* 26.3*   MCHC 30.3* 31.1* 30.7*       BNP:  No results for input(s): "BNP" in the last 168 hours.    Invalid input(s): "BNPTRIAGELBLO"    CMP:  Recent Labs   Lab 09/08/23 0623 09/09/23 0442 09/10/23  0546   * 275* 323*   CALCIUM 9.4 9.5 9.6   ALBUMIN 2.8* 2.9* 3.0*   PROT 7.4 7.6 7.9    138 138   K 4.2 4.3 4.3   CO2 28 25 24    103 103   BUN 11 17 16   CREATININE " 1.1 1.2 1.3   ALKPHOS 99 102 108   ALT <5* <5* <5*   AST 19 18 20   BILITOT 0.3 0.3 0.3        Coagulation:   Recent Labs   Lab 09/08/23  0623 09/09/23  0442 09/10/23  0546   INR 1.6* 1.5* 1.8*   APTT 40.3* 47.5* 49.8*       LDH:  Recent Labs   Lab 09/08/23 0623 09/09/23 0442 09/10/23  0546    239 258       Microbiology:  Microbiology Results (last 7 days)       ** No results found for the last 168 hours. **            BMP:   Recent Labs   Lab 09/10/23  0546   *      K 4.3      CO2 24   BUN 16   CREATININE 1.3   CALCIUM 9.6   MG 1.6       I have reviewed all pertinent labs within the past 24 hours.    Estimated Creatinine Clearance: 92.1 mL/min (based on SCr of 1.3 mg/dL).    Diagnostic Results:  I have reviewed all pertinent imaging results/findings within the past 24 hours.    Assessment and Plan:     Mr. Kevan Thacker is a 39 y/o AAM w/ PMH of NICM (possible Familial w/ father having LVAD and subsequent transplant) s/p DT-HM3 (6/3/2022), HFrEF, chronic DLES, MSSA bacteremia c/b L empyema (maintained on ancef, maria isabel 9/14), seizures, and IDDM2 who initially presented to [facility] with aphasia and right sided weekness. He was reportedly not taking his coumadin or keppra for the past week due to running out. As per chart review, patient started to experience symptoms around 1am when he went to sleep. Symptoms worsened and EMS was called around 3:30am. Upon admission patient underwent a CT head showed an acute to subacute infarct with possible petechial hemorrhage. Labs were significant for INR of 1.2. Patient was loaded with Keppra and [place] [facility] [place].      Upon arrival, patient was found to be hemodynamically stable w/ MAPs of 106. However, he was noted to be not alert or oriented and extremely lethargic. Patient was noted to have GCS 10-11 with minimally reactive pupils. Patient underwent stat CT brain which showed no acute changes since prior CT, and  transferred to the ICU for closer monitoring. Once in the ICU patient was intubated for airway protection.      Of note, patient was most recently hospitalized from 7/22/2023-8/8/2023 for sepsis/covod. Found to have loculated pleural effussion requiring chest tube placement and three doses of lytics. Effussion improved and patient was discharged with 6 week course of Ancef (to end on 9/14).        Home Medications:    Aspirin 81mg daily   Buspirone 10mg bid   Furosemide 80mg daily   Insulin detemir 25 units bid   Insulin aspart 14 units w/ meals   Milrinone 0.25   Mirtazapine 15mg nightly   Valsartan 40mg bi   Warfarin      Cardiac Imaging:    Echo (7/31/2023): HM3 at 5100. AV does not open. IV septum midline. EF 10-15%. LV severely dilated. Normal wall thickness. Severe global hypokinesis. RV not well visualized due to poor acoustic window. Biatrial enlargment. Mod TR.        * Embolic stroke involving left middle cerebral artery  - CTH with L parietal hypodensity with subtle areas of hyperdensity concerning for acute/subacute infarct with petechial hemorrhage, CTA negative for LVO or critical stenosis. Determined not a candidate for acute interventions with thrombolytics/thrombectomy.  - continue aspirin and statin.   -On heparin and Coumadin   - PT, OT, Speech consulted  -Will continue outpatient speech therapy      Bacteremia due to Staphylococcus  -H/O chronic MSSA DLES infection for which he is on Doxycycline at home  -Blood cxs here +ve for MSSA through 7/28. Repeated 7/29 with NGTD. Repeated again 7/30 NGTD  -PICC line replaced 8/1  -ECHO done 7/31 and no vegetations appreciated   -CT 8/1 with loculated fluid along Left lateral hear border, thoracic surgery consulted, not a candidate for VATS. Chest tube placed with IR 8/3, pleural fluid cultures positive for GPC  -Continue Ancef 8g IV q 24 hours for total of 6 weeks with end date 9/14 (home health orders completed).     Seizure-like activity  - loaded with  keppra in ED prior to arrival.   - EEG unremarkable.  Will continue keppra 1000mg bid as per Neuro recs   -no seizures noted overnight     On mechanically assisted ventilation  -resolved  -Patient self extubated 9/1  -Mechanically ventillatied for airway protection in the setting of AMS w/ stroke.   - Pulmonology consulted to assist with management. Appreciate their assistance     LVAD (left ventricular assist device) present  -HeartMate 3 Implanted on 6/29/2022 as DT with RV failure on milrinone at 0.25 mcg/kg/min.  -Coumadin held intially due to concern for petechial hemorrhage. Repeat CT stable so started on heparin bridge.Coumadin resumed. Goal INR 2.0-3.0. INR subtherapeutic today at 1.8. Trend daily.    -LDH is stable.  Will continue to monitor daily  -Speed set at 5100, LSL 4700 rpm  -No Low flow alarms noted        Procedure: Device Interrogation Including analysis of device parameters  Current Settings: Ventricular Assist Device  Review of device function is stable        9/10/2023     9:08 AM 9/10/2023     4:25 AM 9/10/2023    12:33 AM 9/9/2023    10:49 PM 9/9/2023     3:39 PM 9/9/2023    11:55 AM 9/9/2023     9:46 AM   TXP LVAD INTERROGATIONS   Type HeartMate3 HeartMate3 HeartMate3 HeartMate3 HeartMate3 HeartMate3 HeartMate3   Flow 4.4 4.5 4.4 4.3 4.5 4.3 4.4   Speed 5100 5100 5100 5150 5100 5100 5100   PI 3.7 3.6 3.8 4.4 4 4.9 3.9   Power (Serna) 3.7 3.7 3.7 3.7 3.7 3.7 3.6   LSL       4700       Type 2 diabetes mellitus with hyperglycemia  - management as per endocrinology.     Acute combined systolic and diastolic congestive heart failure  -NICM s/p LVAD and on Milrinone @ 0.25   -Echo on (9/5/23): HM3 at 5100.The aortic valve does not open. septum is at midline. LV severely dilated. severely reduced systolic function with EF 10 -15%. There is normal diastolic function. RV Systolic function is normal. Mild MR, Severe TR  - Home GDMT: valsartan 40mg bid (continued)  - Home diuretic regimen: Lasix 80mg  daily.  (continued)   - Ionotropes: continue home milrinone 0.25.   - Strict I&Os and daily weights.   - Maintain K>4 and Mg>2        Jeff Spencer PA-C  Heart Transplant  [facility] - Cardiology Stepdown

## 2023-09-10 NOTE — SUBJECTIVE & OBJECTIVE
Interval History: No acute events overnight. Patient has no complaints. No neuro deficits this AM. INR is up to 1.8. Plan to continue to boost coumadin towards therapeutic and plan for discharge tomorrow.     Continuous Infusions:   heparin (porcine) in D5W 14 Units/kg/hr (09/09/23 2246)    milrinone 20mg/100ml D5W (200mcg/ml) 0.25 mcg/kg/min (09/10/23 0604)     Scheduled Meds:   aspirin  81 mg Oral Daily    atorvastatin  40 mg Oral Daily    busPIRone  10 mg Oral BID    ceFAZolin (ANCEF) 8 g in dextrose 5 % (D5W) 500 mL CONTINUOUS INFUSION  8 g Intravenous Q24H    furosemide  80 mg Oral Daily    insulin aspart U-100  4 Units Subcutaneous TIDWM    insulin detemir U-100  8 Units Subcutaneous BID    levETIRAcetam  1,000 mg Oral BID    magnesium oxide  200 mg Oral Daily    magnesium sulfate IVPB  2 g Intravenous Once    mirtazapine  15 mg Oral Nightly    pantoprazole  40 mg Oral Daily    polyethylene glycol  17 g Oral Daily    senna  8.6 mg Oral Daily    valsartan  40 mg Oral BID    warfarin  6 mg Oral Daily     PRN Meds:sodium chloride 0.9%, acetaminophen, dextrose 10%, dextrose 10%, glucagon (human recombinant), glucose, glucose, insulin aspart U-100    Review of patient's allergies indicates:   Allergen Reactions    Bumex [bumetanide] Hives    Torsemide Hives     Objective:     Vital Signs (Most Recent):  Temp: 97.1 °F (36.2 °C) (09/10/23 0718)  Pulse: 99 (09/10/23 0718)  Resp: 17 (09/10/23 0718)  BP: 114/69 (09/10/23 0718)  SpO2: 98 % (09/10/23 0718) Vital Signs (24h Range):  Temp:  [97.1 °F (36.2 °C)-99 °F (37.2 °C)] 97.1 °F (36.2 °C)  Pulse:  [] 99  Resp:  [17-18] 17  SpO2:  [98 %] 98 %  BP: ()/(0-69) 114/69     Patient Vitals for the past 72 hrs (Last 3 readings):   Weight   09/10/23 0425 94.4 kg (208 lb 1.8 oz)   09/09/23 0500 88 kg (194 lb 0.1 oz)       Body mass index is 26.01 kg/m².      Intake/Output Summary (Last 24 hours) at 9/10/2023 1054  Last data filed at 9/10/2023 0903  Gross per 24 hour  "  Intake 945.71 ml   Output 2775 ml   Net -1829.29 ml         Hemodynamic Parameters:       Telemetry: NSR        Physical Exam  Constitutional:       Appearance: Normal appearance.   HENT:      Head: Normocephalic and atraumatic.   Neck:      Comments: JVP is ~ midneck at 45 degrees  Cardiovascular:      Rate and Rhythm: Normal rate and regular rhythm.      Pulses: Normal pulses.      Comments: Smooth VAD hum   Pulmonary:      Effort: Pulmonary effort is normal.      Breath sounds: Normal breath sounds.   Abdominal:      General: Bowel sounds are normal.      Palpations: Abdomen is soft.   Musculoskeletal:         General: Normal range of motion.      Cervical back: Normal range of motion and neck supple.      Right lower leg: No edema.      Left lower leg: No edema.   Skin:     General: Skin is warm and dry.   Neurological:      General: No focal deficit present.      Mental Status: He is alert and oriented to person, place, and time.            Significant Labs:  CBC:  Recent Labs   Lab 09/08/23  0623 09/09/23  0442 09/10/23  0546   WBC 8.26 9.47 11.20   RBC 3.88* 3.95* 4.11*   HGB 10.3* 10.4* 10.8*   HCT 34.0* 33.4* 35.2*    340 380   MCV 88 85 86   MCH 26.5* 26.3* 26.3*   MCHC 30.3* 31.1* 30.7*       BNP:  No results for input(s): "BNP" in the last 168 hours.    Invalid input(s): "BNPTRIAGELBLO"    CMP:  Recent Labs   Lab 09/08/23  0623 09/09/23  0442 09/10/23  0546   * 275* 323*   CALCIUM 9.4 9.5 9.6   ALBUMIN 2.8* 2.9* 3.0*   PROT 7.4 7.6 7.9    138 138   K 4.2 4.3 4.3   CO2 28 25 24    103 103   BUN 11 17 16   CREATININE 1.1 1.2 1.3   ALKPHOS 99 102 108   ALT <5* <5* <5*   AST 19 18 20   BILITOT 0.3 0.3 0.3        Coagulation:   Recent Labs   Lab 09/08/23  0623 09/09/23  0442 09/10/23  0546   INR 1.6* 1.5* 1.8*   APTT 40.3* 47.5* 49.8*       LDH:  Recent Labs   Lab 09/08/23  0623 09/09/23  0442 09/10/23  0546    239 258       Microbiology:  Microbiology Results (last 7 days)  "      ** No results found for the last 168 hours. **            BMP:   Recent Labs   Lab 09/10/23  0546   *      K 4.3      CO2 24   BUN 16   CREATININE 1.3   CALCIUM 9.6   MG 1.6       I have reviewed all pertinent labs within the past 24 hours.    Estimated Creatinine Clearance: 92.1 mL/min (based on SCr of 1.3 mg/dL).    Diagnostic Results:  I have reviewed all pertinent imaging results/findings within the past 24 hours.

## 2023-09-10 NOTE — CARE UPDATE
-Glucose Goal 140-180    -A1C:   Hemoglobin A1C   Date Value Ref Range Status   08/31/2023 6.9 (H) 4.0 - 5.6 % Final     Comment:     ADA Screening Guidelines:  5.7-6.4%  Consistent with prediabetes  >or=6.5%  Consistent with diabetes    High levels of fetal hemoglobin interfere with the HbA1C  assay. Heterozygous hemoglobin variants (HbS, HgC, etc)do  not significantly interfere with this assay.   However, presence of multiple variants may affect accuracy.           -HOME REGIMEN:   -Levemir 22 units BID.   -Novolog 16 units TIDWM in addition to the following correction scale:     150 - 200 + 1 unit     201 - 250 + 2 units     251 - 300 + 3 units     301 - 350 + 4 units      > 350   + 5 units    -GLUCOSE TREND FOR THE PAST 24HRS:   Recent Labs   Lab 09/09/23  1103 09/09/23  1205 09/09/23  1518 09/09/23  2000 09/10/23  0746 09/10/23  1114   POCTGLUCOSE 231* 199* 143* 190* 248* 208*           -NO HYPOGYCEMIAS NOTED     - Diet  Diet Cardiac Double Portions; Fluid - 1500mL; Thin    T2DM.  LVAD.  BG at or above goal with scheduled insulin and prn correction scale.     Plan:   - continue Levemir (Insulin Detemir) to 8 units BID  - increase to  novolog 6 units with meals.   - Novolog (Insulin Aspart) prn for BG excursions [facility] (150/25)  - BG checks ac/hs   - Hypoglycemia protocol in place      ** Please notify Endocrine for any change and/or advance in diet**  ** Please call Endocrine for any BG related issues **     Discharge Planning:   TBD. Please notify endocrinology prior to discharge.

## 2023-09-11 VITALS
WEIGHT: 208.13 LBS | HEART RATE: 94 BPM | SYSTOLIC BLOOD PRESSURE: 82 MMHG | OXYGEN SATURATION: 98 % | RESPIRATION RATE: 18 BRPM | TEMPERATURE: 99 F | BODY MASS INDEX: 25.88 KG/M2 | HEIGHT: 75 IN

## 2023-09-11 LAB
ALBUMIN SERPL BCP-MCNC: 3 G/DL (ref 3.5–5.2)
ALP SERPL-CCNC: 108 U/L (ref 55–135)
ALT SERPL W/O P-5'-P-CCNC: 5 U/L (ref 10–44)
ANION GAP SERPL CALC-SCNC: 11 MMOL/L (ref 8–16)
APTT PPP: 50.1 SEC (ref 21–32)
AST SERPL-CCNC: 21 U/L (ref 10–40)
BASOPHILS # BLD AUTO: 0.04 K/UL (ref 0–0.2)
BASOPHILS NFR BLD: 0.4 % (ref 0–1.9)
BILIRUB SERPL-MCNC: 0.3 MG/DL (ref 0.1–1)
BUN SERPL-MCNC: 18 MG/DL (ref 6–20)
CALCIUM SERPL-MCNC: 9.6 MG/DL (ref 8.7–10.5)
CHLORIDE SERPL-SCNC: 102 MMOL/L (ref 95–110)
CO2 SERPL-SCNC: 26 MMOL/L (ref 23–29)
CREAT SERPL-MCNC: 1.3 MG/DL (ref 0.5–1.4)
DIFFERENTIAL METHOD: ABNORMAL
EOSINOPHIL # BLD AUTO: 0.2 K/UL (ref 0–0.5)
EOSINOPHIL NFR BLD: 1.4 % (ref 0–8)
ERYTHROCYTE [DISTWIDTH] IN BLOOD BY AUTOMATED COUNT: 18.6 % (ref 11.5–14.5)
EST. GFR  (NO RACE VARIABLE): >60 ML/MIN/1.73 M^2
GLUCOSE SERPL-MCNC: 309 MG/DL (ref 70–110)
HCT VFR BLD AUTO: 34.5 % (ref 40–54)
HGB BLD-MCNC: 11.1 G/DL (ref 14–18)
IMM GRANULOCYTES # BLD AUTO: 0.02 K/UL (ref 0–0.04)
IMM GRANULOCYTES NFR BLD AUTO: 0.2 % (ref 0–0.5)
INR PPP: 2 (ref 0.8–1.2)
LDH SERPL L TO P-CCNC: 253 U/L (ref 110–260)
LYMPHOCYTES # BLD AUTO: 2.4 K/UL (ref 1–4.8)
LYMPHOCYTES NFR BLD: 22.2 % (ref 18–48)
MAGNESIUM SERPL-MCNC: 1.7 MG/DL (ref 1.6–2.6)
MCH RBC QN AUTO: 26.6 PG (ref 27–31)
MCHC RBC AUTO-ENTMCNC: 32.2 G/DL (ref 32–36)
MCV RBC AUTO: 83 FL (ref 82–98)
MONOCYTES # BLD AUTO: 0.9 K/UL (ref 0.3–1)
MONOCYTES NFR BLD: 8.2 % (ref 4–15)
NEUTROPHILS # BLD AUTO: 7.3 K/UL (ref 1.8–7.7)
NEUTROPHILS NFR BLD: 67.6 % (ref 38–73)
NRBC BLD-RTO: 0 /100 WBC
PHOSPHATE SERPL-MCNC: 3.9 MG/DL (ref 2.7–4.5)
PLATELET # BLD AUTO: 397 K/UL (ref 150–450)
PMV BLD AUTO: 9.9 FL (ref 9.2–12.9)
POCT GLUCOSE: 179 MG/DL (ref 70–110)
POCT GLUCOSE: 267 MG/DL (ref 70–110)
POCT GLUCOSE: 284 MG/DL (ref 70–110)
POTASSIUM SERPL-SCNC: 4.1 MMOL/L (ref 3.5–5.1)
PROT SERPL-MCNC: 8.1 G/DL (ref 6–8.4)
PROTHROMBIN TIME: 20.8 SEC (ref 9–12.5)
RBC # BLD AUTO: 4.17 M/UL (ref 4.6–6.2)
SODIUM SERPL-SCNC: 139 MMOL/L (ref 136–145)
WBC # BLD AUTO: 10.79 K/UL (ref 3.9–12.7)

## 2023-09-11 PROCEDURE — 85610 PROTHROMBIN TIME: CPT | Performed by: INTERNAL MEDICINE

## 2023-09-11 PROCEDURE — 84100 ASSAY OF PHOSPHORUS: CPT | Performed by: INTERNAL MEDICINE

## 2023-09-11 PROCEDURE — 83735 ASSAY OF MAGNESIUM: CPT | Performed by: INTERNAL MEDICINE

## 2023-09-11 PROCEDURE — 85730 THROMBOPLASTIN TIME PARTIAL: CPT | Performed by: INTERNAL MEDICINE

## 2023-09-11 PROCEDURE — 25000003 PHARM REV CODE 250: Performed by: REGISTERED NURSE

## 2023-09-11 PROCEDURE — 83615 LACTATE (LD) (LDH) ENZYME: CPT | Performed by: PHYSICIAN ASSISTANT

## 2023-09-11 PROCEDURE — 99232 SBSQ HOSP IP/OBS MODERATE 35: CPT | Mod: ,,, | Performed by: NURSE PRACTITIONER

## 2023-09-11 PROCEDURE — 93750 PR INTERROGATE VENT ASSIST DEV, IN PERSON, W PHYSICIAN ANALYSIS: ICD-10-PCS | Mod: ,,, | Performed by: INTERNAL MEDICINE

## 2023-09-11 PROCEDURE — 25000003 PHARM REV CODE 250: Performed by: INTERNAL MEDICINE

## 2023-09-11 PROCEDURE — 80053 COMPREHEN METABOLIC PANEL: CPT | Performed by: INTERNAL MEDICINE

## 2023-09-11 PROCEDURE — 25000003 PHARM REV CODE 250: Performed by: PHYSICIAN ASSISTANT

## 2023-09-11 PROCEDURE — 63600175 PHARM REV CODE 636 W HCPCS: Performed by: PHYSICIAN ASSISTANT

## 2023-09-11 PROCEDURE — 85025 COMPLETE CBC W/AUTO DIFF WBC: CPT | Performed by: INTERNAL MEDICINE

## 2023-09-11 PROCEDURE — 99233 PR SUBSEQUENT HOSPITAL CARE,LEVL III: ICD-10-PCS | Mod: FS,,, | Performed by: PHYSICIAN ASSISTANT

## 2023-09-11 PROCEDURE — 27000248 HC VAD-ADDITIONAL DAY

## 2023-09-11 PROCEDURE — 99232 PR SUBSEQUENT HOSPITAL CARE,LEVL II: ICD-10-PCS | Mod: ,,, | Performed by: NURSE PRACTITIONER

## 2023-09-11 PROCEDURE — 99233 SBSQ HOSP IP/OBS HIGH 50: CPT | Mod: FS,,, | Performed by: PHYSICIAN ASSISTANT

## 2023-09-11 PROCEDURE — 93750 INTERROGATION VAD IN PERSON: CPT | Mod: ,,, | Performed by: INTERNAL MEDICINE

## 2023-09-11 RX ORDER — ACETAMINOPHEN 325 MG/1
650 TABLET ORAL EVERY 6 HOURS PRN
Refills: 0 | COMMUNITY
Start: 2023-09-11

## 2023-09-11 RX ORDER — MAGNESIUM SULFATE HEPTAHYDRATE 40 MG/ML
2 INJECTION, SOLUTION INTRAVENOUS ONCE
Status: COMPLETED | OUTPATIENT
Start: 2023-09-11 | End: 2023-09-11

## 2023-09-11 RX ORDER — POLYETHYLENE GLYCOL 3350 17 G/17G
17 POWDER, FOR SOLUTION ORAL DAILY
Qty: 30 EACH | Refills: 1 | Status: SHIPPED | OUTPATIENT
Start: 2023-09-12

## 2023-09-11 RX ORDER — ATORVASTATIN CALCIUM 40 MG/1
40 TABLET, FILM COATED ORAL DAILY
Qty: 90 TABLET | Refills: 3 | Status: SHIPPED | OUTPATIENT
Start: 2023-09-12 | End: 2024-09-11

## 2023-09-11 RX ORDER — LANOLIN ALCOHOL/MO/W.PET/CERES
400 CREAM (GRAM) TOPICAL DAILY
Qty: 30 TABLET | Refills: 11 | Status: SHIPPED | OUTPATIENT
Start: 2023-09-12 | End: 2023-10-24 | Stop reason: SDUPTHER

## 2023-09-11 RX ORDER — LANOLIN ALCOHOL/MO/W.PET/CERES
400 CREAM (GRAM) TOPICAL DAILY
Status: DISCONTINUED | OUTPATIENT
Start: 2023-09-11 | End: 2023-09-11 | Stop reason: HOSPADM

## 2023-09-11 RX ORDER — PANTOPRAZOLE SODIUM 40 MG/1
40 TABLET, DELAYED RELEASE ORAL DAILY
Qty: 30 TABLET | Refills: 11 | Status: SHIPPED | OUTPATIENT
Start: 2023-09-12 | End: 2024-09-11

## 2023-09-11 RX ORDER — INSULIN ASPART 100 [IU]/ML
6 INJECTION, SOLUTION INTRAVENOUS; SUBCUTANEOUS
Qty: 18 ML | Refills: 5 | Status: ON HOLD | OUTPATIENT
Start: 2023-09-11 | End: 2023-11-15 | Stop reason: SDUPTHER

## 2023-09-11 RX ORDER — INSULIN ASPART 100 [IU]/ML
8 INJECTION, SOLUTION INTRAVENOUS; SUBCUTANEOUS
Status: DISCONTINUED | OUTPATIENT
Start: 2023-09-11 | End: 2023-09-11 | Stop reason: HOSPADM

## 2023-09-11 RX ORDER — MILRINONE LACTATE 0.2 MG/ML
0.25 INJECTION, SOLUTION INTRAVENOUS CONTINUOUS
Start: 2023-09-11

## 2023-09-11 RX ORDER — SENNOSIDES 8.6 MG/1
1 TABLET ORAL DAILY
Qty: 30 TABLET | Refills: 1 | Status: ON HOLD | OUTPATIENT
Start: 2023-09-12 | End: 2023-11-15 | Stop reason: HOSPADM

## 2023-09-11 RX ADMIN — INSULIN ASPART 2 UNITS: 100 INJECTION, SOLUTION INTRAVENOUS; SUBCUTANEOUS at 05:09

## 2023-09-11 RX ADMIN — INSULIN ASPART 6 UNITS: 100 INJECTION, SOLUTION INTRAVENOUS; SUBCUTANEOUS at 08:09

## 2023-09-11 RX ADMIN — WARFARIN SODIUM 6 MG: 3 TABLET ORAL at 05:09

## 2023-09-11 RX ADMIN — VALSARTAN 40 MG: 40 TABLET, FILM COATED ORAL at 08:09

## 2023-09-11 RX ADMIN — ATORVASTATIN CALCIUM 40 MG: 20 TABLET, FILM COATED ORAL at 08:09

## 2023-09-11 RX ADMIN — BUSPIRONE HYDROCHLORIDE 10 MG: 10 TABLET ORAL at 08:09

## 2023-09-11 RX ADMIN — Medication 400 MG: at 08:09

## 2023-09-11 RX ADMIN — MILRINONE LACTATE IN DEXTROSE 0.25 MCG/KG/MIN: 200 INJECTION, SOLUTION INTRAVENOUS at 11:09

## 2023-09-11 RX ADMIN — INSULIN ASPART 6 UNITS: 100 INJECTION, SOLUTION INTRAVENOUS; SUBCUTANEOUS at 01:09

## 2023-09-11 RX ADMIN — FUROSEMIDE 80 MG: 80 TABLET ORAL at 08:09

## 2023-09-11 RX ADMIN — MAGNESIUM SULFATE HEPTAHYDRATE 2 G: 40 INJECTION, SOLUTION INTRAVENOUS at 08:09

## 2023-09-11 RX ADMIN — INSULIN ASPART 8 UNITS: 100 INJECTION, SOLUTION INTRAVENOUS; SUBCUTANEOUS at 01:09

## 2023-09-11 RX ADMIN — LEVETIRACETAM 1000 MG: 500 TABLET, FILM COATED ORAL at 08:09

## 2023-09-11 RX ADMIN — INSULIN ASPART 8 UNITS: 100 INJECTION, SOLUTION INTRAVENOUS; SUBCUTANEOUS at 05:09

## 2023-09-11 RX ADMIN — ASPIRIN 81 MG 81 MG: 81 TABLET ORAL at 08:09

## 2023-09-11 RX ADMIN — PANTOPRAZOLE SODIUM 40 MG: 40 TABLET, DELAYED RELEASE ORAL at 08:09

## 2023-09-11 NOTE — ASSESSMENT & PLAN NOTE
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  -Coumadin held intially due to concern for petechial hemorrhage. Repeat CT stable so started on heparin bridge.Coumadin resumed. Goal INR 2.0-3.0. INR therapeutic todayTrend daily.    -LDH is stable.  Will continue to monitor daily  -Speed set at 5100, LSL 4700 rpm  -No Low flow alarms noted        Procedure: Device Interrogation Including analysis of device parameters  Current Settings: Ventricular Assist Device  Review of device function is stable        9/11/2023     4:55 AM 9/10/2023    11:44 PM 9/10/2023     7:31 PM 9/10/2023     4:18 PM 9/10/2023     1:32 PM 9/10/2023     9:08 AM 9/10/2023     4:25 AM   TXP LVAD INTERROGATIONS   Type HeartMate3 HeartMate3 HeartMate3 HeartMate3 HeartMate3 HeartMate3 HeartMate3   Flow 4.1 4.3 4.4 4.2 4.4 4.4 4.5   Speed 5100 5100 5100 5100 5100 5100 5100   PI 5.3 4.4 4.7 5 3.6 3.7 3.6   Power (Serna) 3.7 3.6 3.6 3.6 3.7 3.7 3.7

## 2023-09-11 NOTE — PROGRESS NOTES
09/11/2023  John Alaniz    Current provider:  Marlo Marques MD    Device interrogation:      9/11/2023     4:55 AM 9/10/2023    11:44 PM 9/10/2023     7:31 PM 9/10/2023     4:18 PM 9/10/2023     1:32 PM 9/10/2023     9:08 AM 9/10/2023     4:25 AM   TXP LVAD INTERROGATIONS   Type HeartMate3 HeartMate3 HeartMate3 HeartMate3 HeartMate3 HeartMate3 HeartMate3   Flow 4.1 4.3 4.4 4.2 4.4 4.4 4.5   Speed 5100 5100 5100 5100 5100 5100 5100   PI 5.3 4.4 4.7 5 3.6 3.7 3.6   Power (Serna) 3.7 3.6 3.6 3.6 3.7 3.7 3.7          Rounded on Kevan Queen to ensure all mechanical assist device settings (IABP or VAD) were appropriate and all parameters were within limits.  I was able to ensure all back up equipment was present, the staff had no issues, and the Perfusion Department daily rounding was complete.      For implantable VADs: Interrogation of Ventricular assist device was performed with analysis of device parameters and review of device function. I have personally reviewed the interrogation findings and agree with findings as stated.     In emergency, the nursing units have been notified to contact the perfusion department either by:  Calling [id number] from 630am to 4pm Mon thru Fri, utilizing the On-Call Finder functionality of Epic and searching for Perfusion, or by contacting the hospital  from 4pm to 630am and on weekends and asking to speak with the perfusionist on call.    9:02 AM

## 2023-09-11 NOTE — ASSESSMENT & PLAN NOTE
Endocrinology consulted for BG management.   BG goal 140-180    - Increase Levemir (Insulin Detemir) to 10 units BID  -Increase Novolog to 8 units TID with meals (20% dose increase) Based on prn SQ insulin requirements  - Novolog (Insulin Aspart) prn for BG excursions [facility] (150/25)  - BG checks ac/hs   - Hypoglycemia protocol in place  - If blood glucose greater than 300, please ask patient not to eat food or drink anything other than water until correctional insulin has brought it back below 250    ** Please notify Endocrine for any change and/or advance in diet**  ** Please call Endocrine for any BG related issues **    Discharge Planning:   TBD. Please notify endocrinology prior to discharge.

## 2023-09-11 NOTE — PLAN OF CARE
Problem: Adult Inpatient Plan of Care  Goal: Patient-Specific Goal (Individualized)  Outcome: Ongoing, Progressing  Flowsheets (Taken 9/11/2023 1548)  Anxieties, Fears or Concerns: going home  Individualized Care Needs: Primacor and ABx at home     Problem: Glycemic Control Impaired (Sepsis/Septic Shock)  Goal: Blood Glucose Level Within Desired Range  Outcome: Ongoing, Progressing  Intervention: Optimize Glycemic Control  Flowsheets (Taken 9/11/2023 1548)  Glycemic Management:   blood glucose monitored   supplemental insulin given     Problem: Functional Ability Impaired (Stroke, Ischemic/Transient Ischemic Attack)  Goal: Optimal Functional Ability  Outcome: Ongoing, Progressing  Intervention: Optimize Functional Ability  Flowsheets (Taken 9/11/2023 1548)  Self-Care Promotion: independence encouraged     Problem: Adjustment to Illness (Stroke, Ischemic/Transient Ischemic Attack)  Goal: Optimal Coping  Intervention: Support Psychosocial Response to Stroke  Flowsheets (Taken 9/11/2023 1548)  Supportive Measures:   active listening utilized   self-care encouraged  Family/Support System Care:   self-care encouraged   support provided       Plan of care discussed with patient.  Patient ambulating independently, fall precautions in place. LVAD DP and numbers WNL, smooth LVAD hum. Primacor and Ancef gtts maintained. Mag replaced. Heparin gtt d/c as INR therapeutic. Patient has no complaints of pain. Discussed medications and care.  Patient has no questions at this time.

## 2023-09-11 NOTE — DISCHARGE SUMMARY
[facility] - Cardiology Stepdown  Heart Transplant  Discharge Summary      Patient Name: Kevan Queen  MRN: [id number]  Admission Date: 8/31/2023  Hospital Length of Stay: 11 days  Discharge Date and Time: 09/11/2023 11:34 AM  Attending Physician: Marlo Marques MD   Discharging Provider: Jeff Spencer PA-C  Primary Care Provider: Rosio Armendariz FNP     HPI: Mr. 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 He was reportedly not taking his coumadin or keppra for the past week due to running out. As per chart review, patient started to experience symptoms around 1am when he went to sleep. Symptoms worsened and EMS was called around 3:30am. Upon admission patient underwent a CT head showed an acute to subacute infarct with possible petechial hemorrhage. Labs were significant for INR of 1.2. Patient was loaded with Keppra and transferred to [facility] for further care.      Upon arrival, patient was found to be hemodynamically stable w/ MAPs of 106. However, he was noted to be not alert or oriented and extremely lethargic. Patient was noted to have GCS 10-11 with minimally reactive pupils. Patient underwent stat CT brain which showed no acute changes since prior CT, and transferred to the ICU for closer monitoring. Once in the ICU patient was intubated for airway protection.      Of note, patient was most recently hospitalized from 7/22/2023-8/8/2023 for sepsis/covod. Found to have loculated pleural effussion requiring chest tube placement and three doses of lytics. Effussion improved and patient was discharged with 6 week course of Ancef (to end on 9/14).        Home Medications:    Aspirin 81mg daily   Buspirone 10mg bid   Furosemide 80mg daily    Insulin detemir 25 units bid   Insulin aspart 14 units w/ meals   Milrinone 0.25   Mirtazapine 15mg nightly   Valsartan 40mg bi   Warfarin      Cardiac Imaging:    Echo (7/31/2023): HM3 at 5100. AV does not open. IV septum midline. EF 10-15%. LV severely dilated. Normal wall thickness. Severe global hypokinesis. RV not well visualized due to poor acoustic window. Biatrial enlargment. Mod TR.        * No surgery found *     Hospital Course: Mr Queen was admitted with stroke (L MCA) with possible petechial hemorrhage in setting of not taking his coumadin or Keppra for a week. No interventions were done as he was not a candidate for thrombolytics or thrombectomy. However his neurological status returned back to baseline.  Repeat CT was unremarkable, and neuro gave clearance to resume anticoagulation.  Bridged with heparin until his INR reached 2.0.  Had had EEG's that have been negative. He was volume overloaded on admit and diuresed with IV Lasix.  Resumed his home Lasix dose 80mg/day.       Goals of Care Treatment Preferences:  Code Status: Full Code    Health care agent: [facility] agent number: [phone]                   Consults (From admission, onward)        Status Ordering Provider     Inpatient consult to Neurology  Once        Provider:  (Not yet assigned)    Completed ALENA, PAVANKUMAR     Inpatient consult to Registered Dietitian/Nutritionist  Once        Provider:  (Not yet assigned)    Completed ALENA, PAVANKUMAR     Inpatient consult to Pulmonology  Once        Provider:  (Not yet assigned)    Completed ALENA, PAVANKUMAR     Inpatient consult to Registered Dietitian/Nutritionist  Once        Provider:  (Not yet assigned)    Completed ALENA, PAVANKUMAR     Inpatient consult to Endocrinology  Once        Provider:  (Not yet assigned)    Completed ALENA, PAVANKUMAR     Inpatient consult to Vascular (Stroke) Neurology  Once        Provider:  (Not yet assigned)    Completed ALENA,  JAYJAY          Significant Diagnostic Studies: Labs: All labs within the past 24 hours have been reviewed    Pending Diagnostic Studies:     Procedure Component Value Units Date/Time    Magnesium [[id number]] Collected: 08/31/23 1046    Order Status: Sent Lab Status: In process Updated: 08/31/23 1047    Specimen: Blood         Final Active Diagnoses:    Diagnosis Date Noted POA    PRINCIPAL PROBLEM:  Embolic stroke involving left middle cerebral artery [I63.412] 08/31/2023 Yes    Moderate malnutrition [E44.0] 09/05/2023 Yes    Noncompliance with medication regimen [Z91.148] 09/01/2023 Not Applicable    Bacteremia due to Staphylococcus [R78.81, B95.8] 07/23/2023 Yes    Seizure-like activity [R56.9] 07/20/2022 Yes    LVAD (left ventricular assist device) present [Z95.811] 06/30/2022 Not Applicable    Type 2 diabetes mellitus with hyperglycemia [E11.65] 05/25/2022 Yes    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 [F17.201] 04/12/2022 Yes    Acute combined systolic and diastolic congestive heart failure [I50.41] 11/05/2020 Yes      Problems Resolved During this Admission:      Discharged Condition: stable    Disposition: Home or Self Care    Follow Up:    Patient Instructions:      Ambulatory referral/consult to Speech Therapy   Standing Status: Future   Referral Priority: Routine Referral Type: Speech Therapy   Referral Reason: Specialty Services Required   Requested Specialty: Speech Pathology   Number of Visits Requested: 1     Medications:  Reconciled Home Medications:      Medication List      START taking these medications    acetaminophen 325 MG tablet  Commonly known as: TYLENOL  Take 2 tablets (650 mg total) by mouth every 6 (six) hours as needed for Pain.     atorvastatin 40 MG tablet  Commonly known as: LIPITOR  Take 1 tablet (40 mg total) by mouth once daily.  Start taking on: September 12, 2023     magnesium oxide 400 mg (241.3 mg magnesium) tablet  Commonly known as: MAG-OX  Take 1 tablet (400 mg  "total) by mouth once daily.  Start taking on: September 12, 2023     pantoprazole 40 MG tablet  Commonly known as: PROTONIX  Take 1 tablet (40 mg total) by mouth once daily.  Start taking on: September 12, 2023     polyethylene glycol 17 gram Pwpk  Commonly known as: GLYCOLAX  Take 17 g by mouth once daily.  Start taking on: September 12, 2023     senna 8.6 mg tablet  Commonly known as: SENOKOT  Take 1 tablet by mouth once daily.  Start taking on: September 12, 2023        CHANGE how you take these medications    insulin aspart U-100 100 unit/mL (3 mL) Inpn pen  Commonly known as: NovoLOG  Inject 6 Units into the skin 3 (three) times daily with meals. Plus Sliding Scale: 151-200: +1, 201-250: +2, 251-300: +3, 301-350: +4 and call MD  What changed: how much to take     insulin detemir U-100 (Levemir) 100 unit/mL (3 mL) Inpn pen  Inject 8 Units into the skin 2 (two) times daily.  What changed: how much to take     milrinone 20mg/100ml D5W (200mcg/ml) 20 mg/100 mL (200 mcg/mL) infusion  Commonly known as: PRIMACOR  Inject 23.6 mcg/min into the vein continuous.  What changed:   · how much to take  · how fast to infuse this        CONTINUE taking these medications    aspirin 81 MG EC tablet  Commonly known as: ECOTRIN  Take 1 tablet (81 mg total) by mouth once daily.     busPIRone 10 MG tablet  Commonly known as: BUSPAR  Take 10 mg by mouth 2 (two) times daily.     dextrose 5 % (D5W) SolP 500 mL with ceFAZolin 1 gram SolR  8 g per day IV continuously     furosemide 80 MG tablet  Commonly known as: LASIX  Take 1 tablet (80 mg total) by mouth once daily.     levETIRAcetam 1000 MG tablet  Commonly known as: KEPPRA  Take 1 tablet (1,000 mg total) by mouth 2 (two) times daily.     mirtazapine 15 MG tablet  Commonly known as: REMERON  Take 1 tablet (15 mg total) by mouth nightly.     pen needle, diabetic 32 gauge x 5/32" Ndle  Use to inject insulin 5 (five) times daily.     TRUE METRIX GLUCOSE METER Misc  Generic drug: " blood-glucose meter  Use as instructed     TRUE METRIX GLUCOSE TEST STRIP Strp  Generic drug: blood sugar diagnostic  Use to test blood glucose 4 (four) times daily.     TRUEPLUS LANCETS 33 gauge Misc  Generic drug: lancets  Use to test blood glucose 4 (four) times daily.     valsartan 40 MG tablet  Commonly known as: DIOVAN  Take 1 tablet (40 mg total) by mouth 2 (two) times daily.     warfarin 3 MG tablet  Commonly known as: COUMADIN  Take 1.5 tablets (4.5mg) orally daily, except 2 tablets (6mg) on Wednesdays and Saturdays            Jeff Spencer PA-C  Heart Transplant  [facility] - Cardiology Stepdown

## 2023-09-11 NOTE — SUBJECTIVE & OBJECTIVE
Subjective:     Post-Op Info:  * No surgery found *          Reason for Visit:  Patient is seen in follow up management of LVAD    Interval History: Discharging home today, INR 2.0      Medications:  Continuous Infusions:   milrinone 20mg/100ml D5W (200mcg/ml) 0.25 mcg/kg/min (09/11/23 1132)     Scheduled Meds:   aspirin  81 mg Oral Daily    atorvastatin  40 mg Oral Daily    busPIRone  10 mg Oral BID    ceFAZolin (ANCEF) 8 g in dextrose 5 % (D5W) 500 mL CONTINUOUS INFUSION  8 g Intravenous Q24H    furosemide  80 mg Oral Daily    insulin aspart U-100  8 Units Subcutaneous TIDWM    insulin detemir U-100  10 Units Subcutaneous BID    levETIRAcetam  1,000 mg Oral BID    magnesium oxide  400 mg Oral Daily    mirtazapine  15 mg Oral Nightly    pantoprazole  40 mg Oral Daily    polyethylene glycol  17 g Oral Daily    senna  8.6 mg Oral Daily    valsartan  40 mg Oral BID    warfarin  6 mg Oral Daily     PRN Meds:sodium chloride 0.9%, acetaminophen, dextrose 10%, dextrose 10%, glucagon (human recombinant), glucose, glucose, insulin aspart U-100     Objective:     Vital Signs (Most Recent):  Temp: 97.5 °F (36.4 °C) (09/11/23 1109)  Pulse: 99 (09/11/23 1200)  Resp: 19 (09/11/23 1200)  BP: (!) 76/0 (09/11/23 1200)  SpO2: 95 % (09/11/23 1200) Vital Signs (24h Range):  Temp:  [97.5 °F (36.4 °C)-98.9 °F (37.2 °C)] 97.5 °F (36.4 °C)  Pulse:  [] 99  Resp:  [17-19] 19  SpO2:  [95 %-98 %] 95 %  BP: (76-84)/(0) 76/0       Intake/Output Summary (Last 24 hours) at 9/11/2023 1439  Last data filed at 9/11/2023 1150  Gross per 24 hour   Intake 222 ml   Output 675 ml   Net -453 ml       Physical Exam  HENT:      Head: Normocephalic.   Eyes:      Extraocular Movements: Extraocular movements intact.   Cardiovascular:      Rate and Rhythm: Normal rate and regular rhythm.      Comments: LVAD hum is smooth  Pulmonary:      Effort: Pulmonary effort is normal.      Breath sounds: Normal breath sounds.   Abdominal:      General: Abdomen is  flat.      Palpations: Abdomen is soft.   Skin:     General: Skin is warm and dry.   Neurological:      General: No focal deficit present.         Significant Labs:  BMP:   Recent Labs   Lab 09/11/23  0506   *      K 4.1      CO2 26   BUN 18   CREATININE 1.3   CALCIUM 9.6   MG 1.7     CBC:   Recent Labs   Lab 09/11/23  0506   WBC 10.79   RBC 4.17*   HGB 11.1*   HCT 34.5*      MCV 83   MCH 26.6*   MCHC 32.2     CMP:   Recent Labs   Lab 09/11/23  0506   *   CALCIUM 9.6   ALBUMIN 3.0*   PROT 8.1      K 4.1   CO2 26      BUN 18   CREATININE 1.3   ALKPHOS 108   ALT 5*   AST 21   BILITOT 0.3     Coagulation:   Recent Labs   Lab 09/11/23  0506   INR 2.0*   APTT 50.1*       Significant Diagnostics:  I have reviewed and interpreted all pertinent imaging results/findings within the past 24 hours.    Procedure: Device Interrogation Including analysis of device parameters  Current Settings: Ventricular Assist Device  Review of device function is stable      9/11/2023     4:55 AM 9/10/2023    11:44 PM 9/10/2023     7:31 PM 9/10/2023     4:18 PM 9/10/2023     1:32 PM 9/10/2023     9:08 AM 9/10/2023     4:25 AM   TXP LVAD INTERROGATIONS   Type HeartMate3 HeartMate3 HeartMate3 HeartMate3 HeartMate3 HeartMate3 HeartMate3   Flow 4.1 4.3 4.4 4.2 4.4 4.4 4.5   Speed 5100 5100 5100 5100 5100 5100 5100   PI 5.3 4.4 4.7 5 3.6 3.7 3.6   Power (Serna) 3.7 3.6 3.6 3.6 3.7 3.7 3.7

## 2023-09-11 NOTE — PROGRESS NOTES
[facility]  Heart Transplant  Progress Note    Patient Name: Kevan Queen  MRN: [id number]  Admission Date: 8/31/2023  Hospital Length of Stay: 11 days  Attending Physician: Marlo Marques MD  Primary Care Provider: Rosio Armendariz FNP  Principal Problem:Embolic stroke involving left middle cerebral artery    Subjective:     Interval History: No acute events overnight. Patient has no complaints. No neuro deficits this AM. INR is up to 2.0. Will be discharging home today.     Continuous Infusions:   milrinone 20mg/100ml D5W (200mcg/ml) 0.25 mcg/kg/min (09/10/23 0604)     Scheduled Meds:   aspirin  81 mg Oral Daily    atorvastatin  40 mg Oral Daily    busPIRone  10 mg Oral BID    ceFAZolin (ANCEF) 8 g in dextrose 5 % (D5W) 500 mL CONTINUOUS INFUSION  8 g Intravenous Q24H    furosemide  80 mg Oral Daily    insulin aspart U-100  6 Units Subcutaneous TIDWM    insulin detemir U-100  10 Units Subcutaneous BID    levETIRAcetam  1,000 mg Oral BID    magnesium oxide  400 mg Oral Daily    magnesium sulfate IVPB  2 g Intravenous Once    mirtazapine  15 mg Oral Nightly    pantoprazole  40 mg Oral Daily    polyethylene glycol  17 g Oral Daily    senna  8.6 mg Oral Daily    valsartan  40 mg Oral BID    warfarin  6 mg Oral Daily     PRN Meds:sodium chloride 0.9%, acetaminophen, dextrose 10%, dextrose 10%, glucagon (human recombinant), glucose, glucose, insulin aspart U-100    Review of patient's allergies indicates:   Allergen Reactions    Bumex [bumetanide] Hives    Torsemide Hives     Objective:     Vital Signs (Most Recent):  Temp: 98 °F (36.7 °C) (09/11/23 0832)  Pulse: 97 (09/10/23 1931)  Resp: 18 (09/11/23 0455)  BP: (!) 82/0 (09/11/23 0832)  SpO2: 98 % (09/11/23 0455) Vital Signs (24h Range):  Temp:  [97.5 °F (36.4 °C)-98.9 °F (37.2 °C)] 98 °F (36.7 °C)  Pulse:  [] 97  Resp:  [17-18] 18  SpO2:  [97 %-98 %] 98 %  BP: ()/(0-58) 82/0     Patient Vitals for the past 72 hrs  "(Last 3 readings):   Weight   09/10/23 0425 94.4 kg (208 lb 1.8 oz)   09/09/23 0500 88 kg (194 lb 0.1 oz)       Body mass index is 26.01 kg/m².      Intake/Output Summary (Last 24 hours) at 9/11/2023 1022  Last data filed at 9/10/2023 1700  Gross per 24 hour   Intake 1382.12 ml   Output 1250 ml   Net 132.12 ml         Hemodynamic Parameters:       Telemetry: NSR        Physical Exam  Constitutional:       Appearance: Normal appearance.   HENT:      Head: Normocephalic and atraumatic.   Neck:      Comments: JVP is ~ midneck at 45 degrees  Cardiovascular:      Rate and Rhythm: Normal rate and regular rhythm.      Pulses: Normal pulses.      Comments: Smooth VAD hum   Pulmonary:      Effort: Pulmonary effort is normal.      Breath sounds: Normal breath sounds.   Abdominal:      General: Bowel sounds are normal.      Palpations: Abdomen is soft.   Musculoskeletal:         General: Normal range of motion.      Cervical back: Normal range of motion and neck supple.      Right lower leg: No edema.      Left lower leg: No edema.   Skin:     General: Skin is warm and dry.   Neurological:      General: No focal deficit present.      Mental Status: He is alert and oriented to person, place, and time.            Significant Labs:  CBC:  Recent Labs   Lab 09/09/23  0442 09/10/23  0546 09/11/23  0506   WBC 9.47 11.20 10.79   RBC 3.95* 4.11* 4.17*   HGB 10.4* 10.8* 11.1*   HCT 33.4* 35.2* 34.5*    380 397   MCV 85 86 83   MCH 26.3* 26.3* 26.6*   MCHC 31.1* 30.7* 32.2       BNP:  No results for input(s): "BNP" in the last 168 hours.    Invalid input(s): "BNPTRIAGELBLO"    CMP:  Recent Labs   Lab 09/09/23  0442 09/10/23  0546 09/11/23  0506   * 323* 309*   CALCIUM 9.5 9.6 9.6   ALBUMIN 2.9* 3.0* 3.0*   PROT 7.6 7.9 8.1    138 139   K 4.3 4.3 4.1   CO2 25 24 26    103 102   BUN 17 16 18   CREATININE 1.2 1.3 1.3   ALKPHOS 102 108 108   ALT <5* <5* 5*   AST 18 20 21   BILITOT 0.3 0.3 0.3        Coagulation: "   Recent Labs   Lab 09/09/23  0442 09/10/23  0546 09/11/23  0506   INR 1.5* 1.8* 2.0*   APTT 47.5* 49.8* 50.1*       LDH:  Recent Labs   Lab 09/09/23 0442 09/10/23  0546 09/11/23  0506    258 253       Microbiology:  Microbiology Results (last 7 days)       ** No results found for the last 168 hours. **            BMP:   Recent Labs   Lab 09/11/23  0506   *      K 4.1      CO2 26   BUN 18   CREATININE 1.3   CALCIUM 9.6   MG 1.7       I have reviewed all pertinent labs within the past 24 hours.    Estimated Creatinine Clearance: 92.1 mL/min (based on SCr of 1.3 mg/dL).    Diagnostic Results:  I have reviewed all pertinent imaging results/findings within the past 24 hours.    Assessment and Plan:     Mr. Kevan Thacker is a 37 y/o AAM w/ PMH of NICM (possible Familial w/ father having LVAD and subsequent transplant) s/p DT-HM3 (6/3/2022), HFrEF, chronic DLES, MSSA bacteremia c/b L empyema (maintained on ancef, maria isabel 9/14), seizures, and IDDM2 who initially presented to [facility] with aphasia and right sided weekness. He was reportedly not taking his coumadin or keppra for the past week due to running out. As per chart review, patient started to experience symptoms around 1am when he went to sleep. Symptoms worsened and EMS was called around 3:30am. Upon admission patient underwent a CT head showed an acute to subacute infarct with possible petechial hemorrhage. Labs were significant for INR of 1.2. Patient was loaded with Keppra and transferred to [facility] for further care.      Upon arrival, patient was found to be hemodynamically stable w/ MAPs of 106. However, he was noted to be not alert or oriented and extremely lethargic. Patient was noted to have GCS 10-11 with minimally reactive pupils. Patient underwent stat CT brain which showed no acute changes since prior CT, and transferred to the ICU for closer monitoring. Once in the ICU patient was intubated for airway protection.      Of  note, patient was most recently hospitalized from 7/22/2023-8/8/2023 for sepsis/covod. Found to have loculated pleural effussion requiring chest tube placement and three doses of lytics. Effussion improved and patient was discharged with 6 week course of Ancef (to end on 9/14).        Home Medications:    Aspirin 81mg daily   Buspirone 10mg bid   Furosemide 80mg daily   Insulin detemir 25 units bid   Insulin aspart 14 units w/ meals   Milrinone 0.25   Mirtazapine 15mg nightly   Valsartan 40mg bi   Warfarin      Cardiac Imaging:    Echo (7/31/2023): HM3 at 5100. AV does not open. IV septum midline. EF 10-15%. LV severely dilated. Normal wall thickness. Severe global hypokinesis. RV not well visualized due to poor acoustic window. Biatrial enlargment. Mod TR.        * Embolic stroke involving left middle cerebral artery  - CTH with L parietal hypodensity with subtle areas of hyperdensity concerning for acute/subacute infarct with petechial hemorrhage, CTA negative for LVO or critical stenosis. Determined not a candidate for acute interventions with thrombolytics/thrombectomy.  - continue aspirin and statin.   -On heparin and Coumadin   - PT, OT, Speech consulted  -Will continue outpatient speech therapy      Bacteremia due to Staphylococcus  -H/O chronic MSSA DLES infection for which he is on Doxycycline at home  -Blood cxs here +ve for MSSA through 7/28. Repeated 7/29 with NGTD. Repeated again 7/30 NGTD  -PICC line replaced 8/1  -ECHO done 7/31 and no vegetations appreciated   -CT 8/1 with loculated fluid along Left lateral hear border, thoracic surgery consulted, not a candidate for VATS. Chest tube placed with IR 8/3, pleural fluid cultures positive for GPC  -Continue Ancef 8g IV q 24 hours for total of 6 weeks with end date 9/14 (home health orders completed).     Seizure-like activity  - loaded with keppra in ED prior to arrival.   - EEG unremarkable.  Will continue keppra 1000mg bid as per Neuro recs   -no  seizures noted overnight     On mechanically assisted ventilation  -resolved  -Patient self extubated 9/1  -Mechanically ventillatied for airway protection in the setting of AMS w/ stroke.   - Pulmonology consulted to assist with management. Appreciate their assistance     LVAD (left ventricular assist device) present  -HeartMate 3 Implanted on 6/29/2022 as DT with RV failure on milrinone at 0.25 mcg/kg/min.  -Coumadin held intially due to concern for petechial hemorrhage. Repeat CT stable so started on heparin bridge.Coumadin resumed. Goal INR 2.0-3.0. INR therapeutic todayTrend daily.    -LDH is stable.  Will continue to monitor daily  -Speed set at 5100, LSL 4700 rpm  -No Low flow alarms noted        Procedure: Device Interrogation Including analysis of device parameters  Current Settings: Ventricular Assist Device  Review of device function is stable        9/11/2023     4:55 AM 9/10/2023    11:44 PM 9/10/2023     7:31 PM 9/10/2023     4:18 PM 9/10/2023     1:32 PM 9/10/2023     9:08 AM 9/10/2023     4:25 AM   TXP LVAD INTERROGATIONS   Type HeartMate3 HeartMate3 HeartMate3 HeartMate3 HeartMate3 HeartMate3 HeartMate3   Flow 4.1 4.3 4.4 4.2 4.4 4.4 4.5   Speed 5100 5100 5100 5100 5100 5100 5100   PI 5.3 4.4 4.7 5 3.6 3.7 3.6   Power (Serna) 3.7 3.6 3.6 3.6 3.7 3.7 3.7       Type 2 diabetes mellitus with hyperglycemia  - management as per endocrinology.     Acute combined systolic and diastolic congestive heart failure  -NICM s/p LVAD and on Milrinone @ 0.25   -Echo on (9/5/23): HM3 at 5100.The aortic valve does not open. septum is at midline. LV severely dilated. severely reduced systolic function with EF 10 -15%. There is normal diastolic function. RV Systolic function is normal. Mild MR, Severe TR  - Home GDMT: valsartan 40mg bid (continued)  - Home diuretic regimen: Lasix 80mg daily.  (continued)   - Ionotropes: continue home milrinone 0.25.   - Strict I&Os and daily weights.   - Maintain K>4 and  Mg>2        Jeff Spencer PA-C  Heart Transplant  [facility]

## 2023-09-11 NOTE — PROGRESS NOTES
"[facility] Step[facility]  Endocrinology  Progress Note    Admit Date: 2023     Reason for Consult: Management of T2DM, Hyperglycemia      Surgical Procedure and Date: LVAD 2022     Diabetes diagnosis year:      Home Diabetes Medications:   -Levemir 22 units BID.   -Novolog 16 units TIDWM in addition to the following correction scale:     150 - 200 + 1 unit     201 - 250 + 2 units     251 - 300 + 3 units     301 - 350 + 4 units      > 350   + 5 units     How often checking glucose at home?  Uses Freestyle William    BG readings on regimen: 92-180s  Hypoglycemia on the regimen?  No  Missed doses on regimen?  occasionally skips mealsn and will skip Novolog with breakfast      Diabetes Complications include:     Hyperglycemia     Complicating diabetes co morbidities:   History of MI, CHF, and CKD        HPI: 38 year old male with a past medical history of CHF, DM, drug abuse, medication noncompliance, cardiomyopathy s/p LVAD, and seizures presented to ED on  for aphasia and right sided weakness. Hemorraghic stroke noted. Endocrine consulted to managed hyperglycemia and type 2 diabetes.       Interval HPI:   Overnight events: Remains in CSU. LVAD. BG above goal ranges on current SQ insulin regimen. Diet Cardiac Double Portions; Fluid - 1500mL; Thin    Eatin%  Nausea: No  Hypoglycemia and intervention: No  Fever: No  TPN and/or TF: No  If yes, type of TF/TPN and rate: n/a    BP (!) 82/0   Pulse 97   Temp 98 °F (36.7 °C) (Oral)   Resp 18   Ht 6' 3" (1.905 m)   Wt 94.4 kg (208 lb 1.8 oz)   SpO2 98%   BMI 26.01 kg/m²     Labs Reviewed and Include    Recent Labs   Lab 23  0506   *   CALCIUM 9.6   ALBUMIN 3.0*   PROT 8.1      K 4.1   CO2 26      BUN 18   CREATININE 1.3   ALKPHOS 108   ALT 5*   AST 21   BILITOT 0.3     Lab Results   Component Value Date    WBC 10.79 2023    HGB 11.1 (L) 2023    HCT 34.5 (L) 2023    MCV 83 2023     " "09/11/2023     No results for input(s): "TSH", "FREET4" in the last 168 hours.  Lab Results   Component Value Date    HGBA1C 6.9 (H) 08/31/2023       Nutritional status:   Body mass index is 26.01 kg/m².  Lab Results   Component Value Date    ALBUMIN 3.0 (L) 09/11/2023    ALBUMIN 3.0 (L) 09/10/2023    ALBUMIN 2.9 (L) 09/09/2023     Lab Results   Component Value Date    PREALBUMIN 15 (L) 08/07/2023    PREALBUMIN 21 08/04/2023    PREALBUMIN 29 08/02/2023       Estimated Creatinine Clearance: 92.1 mL/min (based on SCr of 1.3 mg/dL).    Accu-Checks  Recent Labs     09/08/23 2022 09/09/23  0803 09/09/23  1103 09/09/23  1205 09/09/23  1518 09/09/23  2000 09/10/23  0746 09/10/23  1114 09/10/23  1507 09/10/23  1949   POCTGLUCOSE 219* 265* 231* 199* 143* 190* 248* 208* 199* 255*       Current Medications and/or Treatments Impacting Glycemic Control  Immunotherapy:    Immunosuppressants       None          Steroids:   Hormones (From admission, onward)      None          Pressors:    Autonomic Drugs (From admission, onward)      None          Hyperglycemia/Diabetes Medications:   Antihyperglycemics (From admission, onward)      Start     Stop Route Frequency Ordered    09/11/23 0900  insulin detemir U-100 (Levemir) pen 10 Units         -- SubQ 2 times daily 09/11/23 0839    09/10/23 1645  insulin aspart U-100 pen 6 Units         -- SubQ 3 times daily with meals 09/10/23 1448    09/02/23 1334  insulin aspart U-100 pen 0-10 Units         -- SubQ Before meals & nightly PRN 09/02/23 1234            ASSESSMENT and PLAN    Neuro  * Embolic stroke involving left middle cerebral artery  Managed per primary team  Avoid hypoglycemia        Cardiac/Vascular  LVAD (left ventricular assist device) present  Managed per primary team  Avoid hypoglycemia        Endocrine  Type 2 diabetes mellitus with hyperglycemia  Endocrinology consulted for BG management.   BG goal 140-180    - Increase Levemir (Insulin Detemir) to 10 units BID  -Increase " Novolog to 8 units TID with meals (20% dose increase) Based on prn SQ insulin requirements  - Novolog (Insulin Aspart) prn for BG excursions [facility] SSI (150/25)  - BG checks ac/hs   - Hypoglycemia protocol in place  - If blood glucose greater than 300, please ask patient not to eat food or drink anything other than water until correctional insulin has brought it back below 250    ** Please notify Endocrine for any change and/or advance in diet**  ** Please call Endocrine for any BG related issues **    Discharge Planning:   TBD. Please notify endocrinology prior to discharge.          Jody Starkey NP  Endocrinology  [facility]

## 2023-09-11 NOTE — PROGRESS NOTES
Discharge    Pt presents in room aaox4 with open affect. Pt voices agreement with plan to discharge to home today with [facility] for Dobutamine and IV antibiotic. [facility] aware. Pt states a family member will transport pt home. Pt reports coping well and denies further needs, questions, concerns at this time and none indicated. Providing psychosocial and counseling support, education, resources, assistance and discharge planning as indicated. SW remains available.      [facility]  [phone], fax [phone]

## 2023-09-11 NOTE — HOSPITAL COURSE
Mr Queen was admitted with stroke (L MCA) with possible petechial hemorrhage in setting of not taking his coumadin or Keppra for a week. No interventions were done as he was not a candidate for thrombolytics or thrombectomy. However his neurological status returned back to baseline.  Repeat CT was unremarkable, and neuro gave clearance to resume anticoagulation.  Bridged with heparin until his INR reached 2.0.  Had had EEG's that have been negative. He was volume overloaded on admit and diuresed with IV Lasix.  Resumed his home Lasix dose 80mg/day.

## 2023-09-11 NOTE — PROGRESS NOTES
DISCHARGE NOTE:    Kevan Queen is a 38 y.o. male s/p HM3 lvad from 6.3.22 admitted for evaluation of acute to subacute infarct from [facility].     Past Medical History:   Diagnosis Date    Acute respiratory failure with hypoxia 7/22/2023    Arthritis     Awareness alteration, transient 9/1/2022    Cardiomyopathy     CHF (congestive heart failure) 10/01/2020    COVID-19 6/3/2023    Diabetes mellitus     Dilated cardiomyopathy 10/26/2020    Drug abuse 10/2020    Headache 4/19/2023    Hyperglycemia 12/16/2022    Hyperosmolar hyperglycemic state (HHS) 5/25/2022    ICD (implantable cardioverter-defibrillator) in place 10/26/2020    Left ventricular assist device (LVAD) complication, initial encounter 6/5/2023    Muscle cramping 6/15/2022    Renal disorder     SOB (shortness of breath) 6/13/2022    Tingling in extremities 7/13/2022       Hospital Course: During his hospital admission he was found to have a left parietal hypodensity with subtle areas of hyperdensity concerning for acute/subacute infarct. There was no hemorrhagic conversion.     His inr on admission was 1.3. He reported missing several doses of warfarin and running out of his home supply. He was started ib warfarin and UFH. His warfarin dose was increased to 6mg daily. His inr increased to 2 today. Plan to resume his previous home dose of warfarin 4.5mg orally daily, except  6mg on Wednesdays and Saturdays.    He completes cefazolin continuous infusion on 9.14 for treatment of DLI and MSSA bacteremia c/b L empyema.     Pharmacy Interventions/Recommendations:  1) INR Goal: 2-3    2) Antiplatelet Agents: ASA 81mg    3) Heparin Bridging:  UFH / LMWH     4) INR Follow-Up/Discharge Needs:  Thursday with coumadin clinic     See list of discharge medication for dosing instructions.     Kevan Queen and his caregiver verbalized their understanding and had the opportunity to ask questions.      Discharge Medications:     Medication List        START taking these  medications      acetaminophen 325 MG tablet  Commonly known as: TYLENOL  Take 2 tablets (650 mg total) by mouth every 6 (six) hours as needed for Pain.     atorvastatin 40 MG tablet  Commonly known as: LIPITOR  Take 1 tablet (40 mg total) by mouth once daily.  Start taking on: September 12, 2023     magnesium oxide 400 mg (241.3 mg magnesium) tablet  Commonly known as: MAG-OX  Take 1 tablet (400 mg total) by mouth once daily.  Start taking on: September 12, 2023     pantoprazole 40 MG tablet  Commonly known as: PROTONIX  Take 1 tablet (40 mg total) by mouth once daily.  Start taking on: September 12, 2023     polyethylene glycol 17 gram Pwpk  Commonly known as: GLYCOLAX  Take 17 g by mouth once daily.  Start taking on: September 12, 2023     senna 8.6 mg tablet  Commonly known as: SENOKOT  Take 1 tablet by mouth once daily.  Start taking on: September 12, 2023            CHANGE how you take these medications      insulin aspart U-100 100 unit/mL (3 mL) Inpn pen  Commonly known as: NovoLOG  Inject 6 Units into the skin 3 (three) times daily with meals. Plus Sliding Scale: 151-200: +1, 201-250: +2, 251-300: +3, 301-350: +4 and call MD  What changed: how much to take     insulin detemir U-100 (Levemir) 100 unit/mL (3 mL) Inpn pen  Inject 8 Units into the skin 2 (two) times daily.  What changed: how much to take     milrinone 20mg/100ml D5W (200mcg/ml) 20 mg/100 mL (200 mcg/mL) infusion  Commonly known as: PRIMACOR  Inject 23.6 mcg/min into the vein continuous.  What changed:   how much to take  how fast to infuse this            CONTINUE taking these medications      aspirin 81 MG EC tablet  Commonly known as: ECOTRIN  Take 1 tablet (81 mg total) by mouth once daily.     busPIRone 10 MG tablet  Commonly known as: BUSPAR     dextrose 5 % (D5W) SolP 500 mL with ceFAZolin 1 gram SolR  8 g per day IV continuously     furosemide 80 MG tablet  Commonly known as: LASIX  Take 1 tablet (80 mg total) by mouth once daily.    "  levETIRAcetam 1000 MG tablet  Commonly known as: KEPPRA  Take 1 tablet (1,000 mg total) by mouth 2 (two) times daily.     mirtazapine 15 MG tablet  Commonly known as: REMERON  Take 1 tablet (15 mg total) by mouth nightly.     pen needle, diabetic 32 gauge x 5/32" Ndle  Use to inject insulin 5 (five) times daily.     TRUE METRIX GLUCOSE METER Misc  Generic drug: blood-glucose meter  Use as instructed     TRUE METRIX GLUCOSE TEST STRIP Strp  Generic drug: blood sugar diagnostic  Use to test blood glucose 4 (four) times daily.     TRUEPLUS LANCETS 33 gauge Misc  Generic drug: lancets  Use to test blood glucose 4 (four) times daily.     valsartan 40 MG tablet  Commonly known as: DIOVAN  Take 1 tablet (40 mg total) by mouth 2 (two) times daily.     warfarin 3 MG tablet  Commonly known as: COUMADIN  Take 1.5 tablets (4.5mg) orally daily, except 2 tablets (6mg) on Wednesdays and Saturdays               Where to Get Your Medications        These medications were sent to [id number] - [place] - [address]  AT [facility] [address] [facility] [address]  [address] [facility] [address] [place] [address]      Phone: [phone]   atorvastatin 40 MG tablet  insulin aspart U-100 100 unit/mL (3 mL) Inpn pen  insulin detemir U-100 (Levemir) 100 unit/mL (3 mL) Inpn pen  magnesium oxide 400 mg (241.3 mg magnesium) tablet  pantoprazole 40 MG tablet  polyethylene glycol 17 gram Pwpk  senna 8.6 mg tablet       You can get these medications from any pharmacy    You don't need a prescription for these medications  acetaminophen 325 MG tablet       Information about where to get these medications is not yet available    Ask your nurse or doctor about these medications  milrinone 20mg/100ml D5W (200mcg/ml) 20 mg/100 mL (200 mcg/mL) infusion         "

## 2023-09-11 NOTE — SUBJECTIVE & OBJECTIVE
Interval History: No acute events overnight. Patient has no complaints. No neuro deficits this AM. INR is up to 2.0. Will be discharging home today.     Continuous Infusions:   milrinone 20mg/100ml D5W (200mcg/ml) 0.25 mcg/kg/min (09/10/23 0604)     Scheduled Meds:   aspirin  81 mg Oral Daily    atorvastatin  40 mg Oral Daily    busPIRone  10 mg Oral BID    ceFAZolin (ANCEF) 8 g in dextrose 5 % (D5W) 500 mL CONTINUOUS INFUSION  8 g Intravenous Q24H    furosemide  80 mg Oral Daily    insulin aspart U-100  6 Units Subcutaneous TIDWM    insulin detemir U-100  10 Units Subcutaneous BID    levETIRAcetam  1,000 mg Oral BID    magnesium oxide  400 mg Oral Daily    magnesium sulfate IVPB  2 g Intravenous Once    mirtazapine  15 mg Oral Nightly    pantoprazole  40 mg Oral Daily    polyethylene glycol  17 g Oral Daily    senna  8.6 mg Oral Daily    valsartan  40 mg Oral BID    warfarin  6 mg Oral Daily     PRN Meds:sodium chloride 0.9%, acetaminophen, dextrose 10%, dextrose 10%, glucagon (human recombinant), glucose, glucose, insulin aspart U-100    Review of patient's allergies indicates:   Allergen Reactions    Bumex [bumetanide] Hives    Torsemide Hives     Objective:     Vital Signs (Most Recent):  Temp: 98 °F (36.7 °C) (09/11/23 0832)  Pulse: 97 (09/10/23 1931)  Resp: 18 (09/11/23 0455)  BP: (!) 82/0 (09/11/23 0832)  SpO2: 98 % (09/11/23 0455) Vital Signs (24h Range):  Temp:  [97.5 °F (36.4 °C)-98.9 °F (37.2 °C)] 98 °F (36.7 °C)  Pulse:  [] 97  Resp:  [17-18] 18  SpO2:  [97 %-98 %] 98 %  BP: ()/(0-58) 82/0     Patient Vitals for the past 72 hrs (Last 3 readings):   Weight   09/10/23 0425 94.4 kg (208 lb 1.8 oz)   09/09/23 0500 88 kg (194 lb 0.1 oz)       Body mass index is 26.01 kg/m².      Intake/Output Summary (Last 24 hours) at 9/11/2023 1022  Last data filed at 9/10/2023 1700  Gross per 24 hour   Intake 1382.12 ml   Output 1250 ml   Net 132.12 ml         Hemodynamic Parameters:       Telemetry: NSR       "  Physical Exam  Constitutional:       Appearance: Normal appearance.   HENT:      Head: Normocephalic and atraumatic.   Neck:      Comments: JVP is ~ midneck at 45 degrees  Cardiovascular:      Rate and Rhythm: Normal rate and regular rhythm.      Pulses: Normal pulses.      Comments: Smooth VAD hum   Pulmonary:      Effort: Pulmonary effort is normal.      Breath sounds: Normal breath sounds.   Abdominal:      General: Bowel sounds are normal.      Palpations: Abdomen is soft.   Musculoskeletal:         General: Normal range of motion.      Cervical back: Normal range of motion and neck supple.      Right lower leg: No edema.      Left lower leg: No edema.   Skin:     General: Skin is warm and dry.   Neurological:      General: No focal deficit present.      Mental Status: He is alert and oriented to person, place, and time.            Significant Labs:  CBC:  Recent Labs   Lab 09/09/23  0442 09/10/23  0546 09/11/23  0506   WBC 9.47 11.20 10.79   RBC 3.95* 4.11* 4.17*   HGB 10.4* 10.8* 11.1*   HCT 33.4* 35.2* 34.5*    380 397   MCV 85 86 83   MCH 26.3* 26.3* 26.6*   MCHC 31.1* 30.7* 32.2       BNP:  No results for input(s): "BNP" in the last 168 hours.    Invalid input(s): "BNPTRIAGELBLO"    CMP:  Recent Labs   Lab 09/09/23  0442 09/10/23  0546 09/11/23  0506   * 323* 309*   CALCIUM 9.5 9.6 9.6   ALBUMIN 2.9* 3.0* 3.0*   PROT 7.6 7.9 8.1    138 139   K 4.3 4.3 4.1   CO2 25 24 26    103 102   BUN 17 16 18   CREATININE 1.2 1.3 1.3   ALKPHOS 102 108 108   ALT <5* <5* 5*   AST 18 20 21   BILITOT 0.3 0.3 0.3        Coagulation:   Recent Labs   Lab 09/09/23  0442 09/10/23  0546 09/11/23  0506   INR 1.5* 1.8* 2.0*   APTT 47.5* 49.8* 50.1*       LDH:  Recent Labs   Lab 09/09/23  0442 09/10/23  0546 09/11/23  0506    258 253       Microbiology:  Microbiology Results (last 7 days)       ** No results found for the last 168 hours. **            BMP:   Recent Labs   Lab 09/11/23  0506   GLU " 309*      K 4.1      CO2 26   BUN 18   CREATININE 1.3   CALCIUM 9.6   MG 1.7       I have reviewed all pertinent labs within the past 24 hours.    Estimated Creatinine Clearance: 92.1 mL/min (based on SCr of 1.3 mg/dL).    Diagnostic Results:  I have reviewed all pertinent imaging results/findings within the past 24 hours.

## 2023-09-11 NOTE — NURSING
Patient is ready for discharge. Patient stable alert and oriented. PICC remains for home infusions. No complaints of pain. Discussed discharge plan. Reviewed medications, appointments, and answered questions with patient. Pt connected himself to Primacor and ancef infusions for home infusion. Both running smoothly. AVS reviewed with pt and given to pt.

## 2023-09-11 NOTE — PROGRESS NOTES
[facility] - Cardiology Stepdown  Cardiothoracic Surgery  Evaluation and Management/VAD interrogation       Patient Name: Kevan Queen  MRN: [id number]  Admission Date: 8/31/2023  Hospital Length of Stay: 11 days  Code Status: Full Code   Attending Physician: Marlo Marques MD   Referring Provider: Rodger Yadav IV, MD  Principal Problem:Embolic stroke involving left middle cerebral artery    Subjective:     Post-Op Info:  * No surgery found *         Subjective:     Post-Op Info:  * No surgery found *          Reason for Visit:  Patient is seen in follow up management of LVAD    Interval History: Discharging home today, INR 2.0      Medications:  Continuous Infusions:   milrinone 20mg/100ml D5W (200mcg/ml) 0.25 mcg/kg/min (09/11/23 1132)     Scheduled Meds:   aspirin  81 mg Oral Daily    atorvastatin  40 mg Oral Daily    busPIRone  10 mg Oral BID    ceFAZolin (ANCEF) 8 g in dextrose 5 % (D5W) 500 mL CONTINUOUS INFUSION  8 g Intravenous Q24H    furosemide  80 mg Oral Daily    insulin aspart U-100  8 Units Subcutaneous TIDWM    insulin detemir U-100  10 Units Subcutaneous BID    levETIRAcetam  1,000 mg Oral BID    magnesium oxide  400 mg Oral Daily    mirtazapine  15 mg Oral Nightly    pantoprazole  40 mg Oral Daily    polyethylene glycol  17 g Oral Daily    senna  8.6 mg Oral Daily    valsartan  40 mg Oral BID    warfarin  6 mg Oral Daily     PRN Meds:sodium chloride 0.9%, acetaminophen, dextrose 10%, dextrose 10%, glucagon (human recombinant), glucose, glucose, insulin aspart U-100     Objective:     Vital Signs (Most Recent):  Temp: 97.5 °F (36.4 °C) (09/11/23 1109)  Pulse: 99 (09/11/23 1200)  Resp: 19 (09/11/23 1200)  BP: (!) 76/0 (09/11/23 1200)  SpO2: 95 % (09/11/23 1200) Vital Signs (24h Range):  Temp:  [97.5 °F (36.4 °C)-98.9 °F (37.2 °C)] 97.5 °F (36.4 °C)  Pulse:  [] 99  Resp:  [17-19] 19  SpO2:  [95 %-98 %] 95 %  BP: (76-84)/(0) 76/0       Intake/Output Summary (Last 24 hours) at  9/11/2023 1439  Last data filed at 9/11/2023 1150  Gross per 24 hour   Intake 222 ml   Output 675 ml   Net -453 ml       Physical Exam  HENT:      Head: Normocephalic.   Eyes:      Extraocular Movements: Extraocular movements intact.   Cardiovascular:      Rate and Rhythm: Normal rate and regular rhythm.      Comments: LVAD hum is smooth  Pulmonary:      Effort: Pulmonary effort is normal.      Breath sounds: Normal breath sounds.   Abdominal:      General: Abdomen is flat.      Palpations: Abdomen is soft.   Skin:     General: Skin is warm and dry.   Neurological:      General: No focal deficit present.         Significant Labs:  BMP:   Recent Labs   Lab 09/11/23  0506   *      K 4.1      CO2 26   BUN 18   CREATININE 1.3   CALCIUM 9.6   MG 1.7     CBC:   Recent Labs   Lab 09/11/23  0506   WBC 10.79   RBC 4.17*   HGB 11.1*   HCT 34.5*      MCV 83   MCH 26.6*   MCHC 32.2     CMP:   Recent Labs   Lab 09/11/23  0506   *   CALCIUM 9.6   ALBUMIN 3.0*   PROT 8.1      K 4.1   CO2 26      BUN 18   CREATININE 1.3   ALKPHOS 108   ALT 5*   AST 21   BILITOT 0.3     Coagulation:   Recent Labs   Lab 09/11/23  0506   INR 2.0*   APTT 50.1*       Significant Diagnostics:  I have reviewed and interpreted all pertinent imaging results/findings within the past 24 hours.    Procedure: Device Interrogation Including analysis of device parameters  Current Settings: Ventricular Assist Device  Review of device function is stable      9/11/2023     4:55 AM 9/10/2023    11:44 PM 9/10/2023     7:31 PM 9/10/2023     4:18 PM 9/10/2023     1:32 PM 9/10/2023     9:08 AM 9/10/2023     4:25 AM   TXP LVAD INTERROGATIONS   Type HeartMate3 HeartMate3 HeartMate3 HeartMate3 HeartMate3 HeartMate3 HeartMate3   Flow 4.1 4.3 4.4 4.2 4.4 4.4 4.5   Speed 5100 5100 5100 5100 5100 5100 5100   PI 5.3 4.4 4.7 5 3.6 3.7 3.6   Power (Serna) 3.7 3.6 3.6 3.6 3.7 3.7 3.7         Assessment/Plan:     LVAD (left ventricular  assist device) present  - Admitted for Left MCA stroke  - Implant Date: 6/29/2022 with Dr. Paige  - Speed: 5100  - Low Flow Events: None overnight  - Interval History was obtained from team member during rounding today.  - VAD/ANABELLE teaching was performed with patient.  - Mobilization/Physical Therapy is ongoing.  - Anticoagulation: Coumadin/Heparin  - INR: 2.0  - Urine Output: 1250  - BUN: 18   - Creat: 1.3  - LDH: 253  - Awake and alert in bed during assessment  - Feels like he is back to baseline    More than 50 percent of the care dominated counseling and coordinating care with different team members. The VAD was interrogated and no significant findings were found in the history. All these findings are documented in the note above.        Vandana Coleamn NP  Cardiothoracic Surgery  [facility] - Cardiology Stepdown

## 2023-09-11 NOTE — SUBJECTIVE & OBJECTIVE
"Interval HPI:   Overnight events: Remains in CSU. LVAD. BG above goal ranges on current SQ insulin regimen. Diet Cardiac Double Portions; Fluid - 1500mL; Thin    Eatin%  Nausea: No  Hypoglycemia and intervention: No  Fever: No  TPN and/or TF: No  If yes, type of TF/TPN and rate: n/a    BP (!) 82/0   Pulse 97   Temp 98 °F (36.7 °C) (Oral)   Resp 18   Ht 6' 3" (1.905 m)   Wt 94.4 kg (208 lb 1.8 oz)   SpO2 98%   BMI 26.01 kg/m²     Labs Reviewed and Include    Recent Labs   Lab 23  0506   *   CALCIUM 9.6   ALBUMIN 3.0*   PROT 8.1      K 4.1   CO2 26      BUN 18   CREATININE 1.3   ALKPHOS 108   ALT 5*   AST 21   BILITOT 0.3     Lab Results   Component Value Date    WBC 10.79 2023    HGB 11.1 (L) 2023    HCT 34.5 (L) 2023    MCV 83 2023     2023     No results for input(s): "TSH", "FREET4" in the last 168 hours.  Lab Results   Component Value Date    HGBA1C 6.9 (H) 2023       Nutritional status:   Body mass index is 26.01 kg/m².  Lab Results   Component Value Date    ALBUMIN 3.0 (L) 2023    ALBUMIN 3.0 (L) 09/10/2023    ALBUMIN 2.9 (L) 2023     Lab Results   Component Value Date    PREALBUMIN 15 (L) 2023    PREALBUMIN 21 2023    PREALBUMIN 29 2023       Estimated Creatinine Clearance: 92.1 mL/min (based on SCr of 1.3 mg/dL).    Accu-Checks  Recent Labs     23  0803 23  1103 23  1205 23  1518 23  2000 09/10/23  0746 09/10/23  1114 09/10/23  1507 09/10/23  1949   POCTGLUCOSE 219* 265* 231* 199* 143* 190* 248* 208* 199* 255*       Current Medications and/or Treatments Impacting Glycemic Control  Immunotherapy:    Immunosuppressants       None          Steroids:   Hormones (From admission, onward)      None          Pressors:    Autonomic Drugs (From admission, onward)      None          Hyperglycemia/Diabetes Medications:   Antihyperglycemics (From admission, onward) "      Start     Stop Route Frequency Ordered    09/11/23 0900  insulin detemir U-100 (Levemir) pen 10 Units         -- SubQ 2 times daily 09/11/23 0839    09/10/23 1645  insulin aspart U-100 pen 6 Units         -- SubQ 3 times daily with meals 09/10/23 1448    09/02/23 1334  insulin aspart U-100 pen 0-10 Units         -- SubQ Before meals & nightly PRN 09/02/23 1234

## 2023-09-11 NOTE — ASSESSMENT & PLAN NOTE
- Admitted for Left MCA stroke  - Implant Date: 6/29/2022 with Dr. Paige  - Speed: 5100  - Low Flow Events: None overnight  - Interval History was obtained from team member during rounding today.  - VAD/ANABELLE teaching was performed with patient.  - Mobilization/Physical Therapy is ongoing.  - Anticoagulation: Coumadin/Heparin  - INR: 2.0  - Urine Output: 1250  - BUN: 18   - Creat: 1.3  - LDH: 253  - Awake and alert in bed during assessment  - Feels like he is back to baseline    More than 50 percent of the care dominated counseling and coordinating care with different team members. The VAD was interrogated and no significant findings were found in the history. All these findings are documented in the note above.

## 2023-09-12 RX ORDER — WARFARIN 3 MG/1
4.5-6 TABLET ORAL DAILY
Qty: 60 TABLET | Refills: 5 | Status: ON HOLD | OUTPATIENT
Start: 2023-09-12 | End: 2023-11-15 | Stop reason: SDUPTHER

## 2023-09-12 NOTE — PROGRESS NOTES
Kevan Queen is a 38 y.o. male s/p HM3 lvad from 6.3.22 admitted for evaluation of acute to subacute infarct from OSH.  During his hospital admission he was found to have a left parietal hypodensity with subtle areas of hyperdensity concerning for acute/subacute infarct. There was no hemorrhagic conversion.     His inr on admission was 1.3. He reported missing several doses of warfarin and running out of his home supply. He was started ib warfarin and UFH. His warfarin dose was increased to 6mg daily. His inr increased to 2 today. Plan to resume his previous home dose of warfarin 4.5mg orally daily, except  6mg on Wednesdays and Saturdays.     He completes cefazolin continuous infusion on 9.14 for treatment of DLI and MSSA bacteremia c/b L empyema.

## 2023-09-12 NOTE — PROGRESS NOTES
Spoke to Ms. Kyle regarding discharge medication dosing. A discharge warfarin dose of  6 mg every Wed/Sat, and 4.5 mg all other days confirmed with patient. INR scheduled for 09.14.23 with [employer].

## 2023-09-14 ENCOUNTER — PATIENT OUTREACH (OUTPATIENT)
Dept: ADMINISTRATIVE | Facility: CLINIC | Age: 38
End: 2023-09-14
Payer: MEDICAID

## 2023-09-14 ENCOUNTER — PATIENT MESSAGE (OUTPATIENT)
Dept: ADMINISTRATIVE | Facility: OTHER | Age: 38
End: 2023-09-14
Payer: MEDICAID

## 2023-09-14 NOTE — PROGRESS NOTES
C3 nurse spoke with Kevan Queen's simeon for a TCC post hospital discharge follow up call. Pt's fiance stated once the pt had a cramp in his left chest area but it subsided under five minutes after he drank some water. She verbalized understanding to notify the PCP or OOC number for continued or worsening symptoms. The patient does not have a scheduled HOSFU appointment with Rosio Armendariz FNP within 5-7 days post hospital discharge date 9/11/23. C3 nurse was unable to schedule HOSFU appointment in [facility] with PCP or [facility].     Message sent to PCP staff requesting they contact patient and schedule follow up appointment.

## 2023-09-15 ENCOUNTER — OFFICE VISIT (OUTPATIENT)
Dept: INFECTIOUS DISEASES | Facility: CLINIC | Age: 38
End: 2023-09-15
Payer: MEDICAID

## 2023-09-15 DIAGNOSIS — J86.9 EMPYEMA OF LUNG: ICD-10-CM

## 2023-09-15 DIAGNOSIS — T82.7XXA INFECTION ASSOCIATED WITH DRIVELINE OF LEFT VENTRICULAR ASSIST DEVICE (LVAD): Primary | ICD-10-CM

## 2023-09-15 DIAGNOSIS — B95.8 BACTEREMIA DUE TO STAPHYLOCOCCUS: ICD-10-CM

## 2023-09-15 DIAGNOSIS — R78.81 BACTEREMIA DUE TO STAPHYLOCOCCUS: ICD-10-CM

## 2023-09-15 PROCEDURE — 1159F MED LIST DOCD IN RCRD: CPT | Mod: CPTII,95,, | Performed by: STUDENT IN AN ORGANIZED HEALTH CARE EDUCATION/TRAINING PROGRAM

## 2023-09-15 PROCEDURE — 4010F PR ACE/ARB THEARPY RXD/TAKEN: ICD-10-PCS | Mod: CPTII,95,, | Performed by: STUDENT IN AN ORGANIZED HEALTH CARE EDUCATION/TRAINING PROGRAM

## 2023-09-15 PROCEDURE — 3060F POS MICROALBUMINURIA REV: CPT | Mod: CPTII,95,, | Performed by: STUDENT IN AN ORGANIZED HEALTH CARE EDUCATION/TRAINING PROGRAM

## 2023-09-15 PROCEDURE — 99215 PR OFFICE/OUTPT VISIT, EST, LEVL V, 40-54 MIN: ICD-10-PCS | Mod: 95,,, | Performed by: STUDENT IN AN ORGANIZED HEALTH CARE EDUCATION/TRAINING PROGRAM

## 2023-09-15 PROCEDURE — 1111F DSCHRG MED/CURRENT MED MERGE: CPT | Mod: CPTII,95,, | Performed by: STUDENT IN AN ORGANIZED HEALTH CARE EDUCATION/TRAINING PROGRAM

## 2023-09-15 PROCEDURE — 1159F PR MEDICATION LIST DOCUMENTED IN MEDICAL RECORD: ICD-10-PCS | Mod: CPTII,95,, | Performed by: STUDENT IN AN ORGANIZED HEALTH CARE EDUCATION/TRAINING PROGRAM

## 2023-09-15 PROCEDURE — 3044F HG A1C LEVEL LT 7.0%: CPT | Mod: CPTII,95,, | Performed by: STUDENT IN AN ORGANIZED HEALTH CARE EDUCATION/TRAINING PROGRAM

## 2023-09-15 PROCEDURE — 3060F PR POS MICROALBUMINURIA RESULT DOCUMENTED/REVIEW: ICD-10-PCS | Mod: CPTII,95,, | Performed by: STUDENT IN AN ORGANIZED HEALTH CARE EDUCATION/TRAINING PROGRAM

## 2023-09-15 PROCEDURE — 1111F PR DISCHARGE MEDS RECONCILED W/ CURRENT OUTPATIENT MED LIST: ICD-10-PCS | Mod: CPTII,95,, | Performed by: STUDENT IN AN ORGANIZED HEALTH CARE EDUCATION/TRAINING PROGRAM

## 2023-09-15 PROCEDURE — 3044F PR MOST RECENT HEMOGLOBIN A1C LEVEL <7.0%: ICD-10-PCS | Mod: CPTII,95,, | Performed by: STUDENT IN AN ORGANIZED HEALTH CARE EDUCATION/TRAINING PROGRAM

## 2023-09-15 PROCEDURE — 4010F ACE/ARB THERAPY RXD/TAKEN: CPT | Mod: CPTII,95,, | Performed by: STUDENT IN AN ORGANIZED HEALTH CARE EDUCATION/TRAINING PROGRAM

## 2023-09-15 PROCEDURE — 3066F PR DOCUMENTATION OF TREATMENT FOR NEPHROPATHY: ICD-10-PCS | Mod: CPTII,95,, | Performed by: STUDENT IN AN ORGANIZED HEALTH CARE EDUCATION/TRAINING PROGRAM

## 2023-09-15 PROCEDURE — 1160F PR REVIEW ALL MEDS BY PRESCRIBER/CLIN PHARMACIST DOCUMENTED: ICD-10-PCS | Mod: CPTII,95,, | Performed by: STUDENT IN AN ORGANIZED HEALTH CARE EDUCATION/TRAINING PROGRAM

## 2023-09-15 PROCEDURE — 3066F NEPHROPATHY DOC TX: CPT | Mod: CPTII,95,, | Performed by: STUDENT IN AN ORGANIZED HEALTH CARE EDUCATION/TRAINING PROGRAM

## 2023-09-15 PROCEDURE — 1160F RVW MEDS BY RX/DR IN RCRD: CPT | Mod: CPTII,95,, | Performed by: STUDENT IN AN ORGANIZED HEALTH CARE EDUCATION/TRAINING PROGRAM

## 2023-09-15 PROCEDURE — 99215 OFFICE O/P EST HI 40 MIN: CPT | Mod: 95,,, | Performed by: STUDENT IN AN ORGANIZED HEALTH CARE EDUCATION/TRAINING PROGRAM

## 2023-09-15 RX ORDER — CEFADROXIL 500 MG/1
500 CAPSULE ORAL EVERY 12 HOURS
Qty: 60 CAPSULE | Refills: 11 | Status: ON HOLD | OUTPATIENT
Start: 2023-09-15 | End: 2023-11-15 | Stop reason: HOSPADM

## 2023-09-15 NOTE — LETTER
September 15, 2023        Dalia Crum MD  [address] [facility] [address]             [facility] - Infectious Disease 1st Fl  [address]  [facility] [address]  Phone: [phone]  Fax: [phone]   Patient: Kevan Queen   MR Number: [id number]   YOB: 1985   Date of Visit: 9/15/2023       Dear Dr. Crum:    Thank you for referring Kevan Queen to me for evaluation. Below are the relevant portions of my assessment and plan of care.            If you have questions, please do not hesitate to call me. I look forward to following Kevan along with you.    Sincerely,      [place], Laura FLORES MD           CC    No Recipients

## 2023-09-15 NOTE — PROGRESS NOTES
The patient location is: [place]/home  The chief complaint leading to consultation is: follow up, EOC    Visit type: audiovisual    Face to Face time with patient: 10  60 minutes of total time spent on the encounter, which includes face to face time and non-face to face time preparing to see the patient (eg, review of tests), Obtaining and/or reviewing separately obtained history, Documenting clinical information in the electronic or other health record, Independently interpreting results (not separately reported) and communicating results to the patient/family/caregiver, or Care coordination (not separately reported).         Each patient to whom he or she provides medical services by telemedicine is:  (1) informed of the relationship between the physician and patient and the respective role of any other health care provider with respect to management of the patient; and (2) notified that he or she may decline to receive medical services by telemedicine and may withdraw from such care at any time.    Notes:         INFECTIOUS DISEASE CLINIC  09/15/2023     Subjective:      Chief Complaint:   Chief Complaint   Patient presents with    Follow-up       History of Present Illness:    This is a 38 y.o. male with LVAD c/b prior MSSA DLESI with recent admission for COVID, DLESI and MSSA bacteremia c/b L empyema (maintained on ancef, maria isabel 9/14) who is referred to my clinic for follow up.  Patient known to ID, please see prior notes for full details. Doing well since discharge, reports tolerating ancef without issues. PICC line inadvertently was pulled a couple of weeks ago and went to ER to get it replaced with CXR done at that time with interval improvement in L sided opacity.  Denies fevers, chills, drainage from DLES or diarrhea. Reports breathing is better, still with minimal L sided pleuritic chest pain.     Interval history:  9/15/23: Pt states he is doing well. Tolerating ancef without issues, on last bulb. No fevers,  chills, dlesi drainage, reports minimal L pleuritic CP. Denies issues with PICC. Per chart review, pt was recently admitted for seizure like activity. Denies further seizures, HA or vision changes. Late to our appt - spoke with mother who got in touch with pt to get on virtual.       ROS      Past Medical History:   Diagnosis Date    Acute respiratory failure with hypoxia 7/22/2023    Arthritis     Awareness alteration, transient 9/1/2022    Cardiomyopathy     CHF (congestive heart failure) 10/01/2020    COVID-19 6/3/2023    Diabetes mellitus     Dilated cardiomyopathy 10/26/2020    Drug abuse 10/2020    Headache 4/19/2023    Hyperglycemia 12/16/2022    Hyperosmolar hyperglycemic state (HHS) 5/25/2022    ICD (implantable cardioverter-defibrillator) in place 10/26/2020    Left ventricular assist device (LVAD) complication, initial encounter 6/5/2023    Muscle cramping 6/15/2022    Renal disorder     SOB (shortness of breath) 6/13/2022    Tingling in extremities 7/13/2022     Past Surgical History:   Procedure Laterality Date    APPLICATION OF WOUND VACUUM-ASSISTED CLOSURE DEVICE N/A 6/30/2022    Procedure: APPLICATION, WOUND VAC;  Surgeon: Luis F Paige MD;  Location: [facility] OR [address] [place] [address];  Service: Cardiovascular;  Laterality: N/A;  50 x 5 cm    CARDIAC DEFIBRILLATOR PLACEMENT      IMPLANTATION OF RIGHT VENTRICULAR ASSIST DEVICE (RVAD) N/A 6/29/2022    Procedure: INSERTION, RVAD;  Surgeon: Luis F Paige MD;  [facility];  Service: Cardiovascular;  Laterality: N/A;    INSERTION OF GRAFT TO PERICARDIUM Right 6/30/2022    Procedure: INSERTION-RIGHT VENTRICULAR ASSIST DEVICE;  Surgeon: Luis F Paige MD;  [facility];  Service: Cardiovascular;  Laterality: Right;    IRRIGATION OF MEDIASTINUM  6/30/2022    Procedure: IRRIGATION, MEDIASTINUM;  Surgeon: Luis F Paige MD;  [facility];  Service: Cardiovascular;;    LEFT VENTRICULAR ASSIST DEVICE Left 6/23/2022    Procedure:  INSERTION-LEFT VENTRICULAR ASSIST DEVICE;  Surgeon: Luis F Paige MD;  [facility];  Service: Cardiovascular;  Laterality: Left;    LEFT VENTRICULAR ASSIST DEVICE N/A 2022    Procedure: INSERTION-LEFT VENTRICULAR ASSIST DEVICE;  Surgeon: Luis F Paige MD;  [address]  Service: Cardiovascular;  Laterality: N/A;    RIGHT HEART CATHETERIZATION Right 2022    Procedure: INSERTION, CATHETER, RIGHT HEART;  Surgeon: Luca Lopez Jr., MD;  Location: [facility] CATH LAB;  Service: Cardiology;  Laterality: Right;    RIGHT HEART CATHETERIZATION Right 2022    Procedure: INSERTION, CATHETER, RIGHT HEART;  Surgeon: Josh Pulido MD;  Location: [facility] CATH LAB;  Service: Cardiology;  Laterality: Right;    RIGHT HEART CATHETERIZATION Right 2022    Procedure: INSERTION, CATHETER, RIGHT HEART;  Surgeon: Dalia Crum MD;  Location: [facility] CATH LAB;  Service: Cardiology;  Laterality: Right;    RIGHT HEART CATHETERIZATION Right 10/31/2022    Procedure: INSERTION, CATHETER, RIGHT HEART;  Surgeon: Dalia Crum MD;  Location: [facility] CATH LAB;  Service: Cardiology;  Laterality: Right;    STERNAL WOUND CLOSURE N/A 2022    Procedure: CLOSURE, WOUND, STERNUM;  Surgeon: Luis F Paige MD;  [address]  Service: Cardiovascular;  Laterality: N/A;     Family History   Problem Relation Age of Onset    Heart failure Father     Diverticulosis Brother     Heart attack Maternal Grandmother     Heart attack Maternal Grandfather      Social History     Tobacco Use    Smoking status: Former     Current packs/day: 0.00     Average packs/day: 0.5 packs/day for 16.6 years (8.3 ttl pk-yrs)     Types: Cigarettes     Start date: 10/1/2004     Quit date: 2021     Years since quittin.3    Smokeless tobacco: Never   Substance Use Topics    Alcohol use: Not Currently    Drug use: Not Currently     Types: Marijuana, MDMA (Ecstacy)       Review of patient's allergies indicates:    Allergen Reactions    Bumex [bumetanide] Hives    Torsemide Hives         Objective:   VS (24h): There were no vitals filed for this visit.      Physical Exam  Constitutional:       General: He is not in acute distress.     Appearance: He is not ill-appearing.   HENT:      Head: Normocephalic and atraumatic.   Eyes:      General: No scleral icterus.        Right eye: No discharge.         Left eye: No discharge.   Pulmonary:      Effort: Pulmonary effort is normal.   Skin:     Findings: No bruising.   Neurological:      Mental Status: He is alert and oriented to person, place, and time.             Labs: reviewed from recent hospitalization      Micro:   8/5 blcx ngtd  8/3 pleural cx ngtd  7/28 blcx MSSA    Radiology:     CXR 9/3 -  interval improvement in L effusion/consolidation compared to prior    Immunization History   Administered Date(s) Administered    COVID-19, MRNA, LN-S, PF (Pfizer) (Purple Cap) 12/08/2021, 12/29/2021    DTP 1985, [birth year], 06/01/1987    HIB 02/05/1990    Influenza - Quadrivalent - PF *Preferred* (6 months and older) 10/16/2020, 09/24/2021, 12/16/2022    MMR 06/01/1987    OPV 1985, 1985, 06/01/1987         Assessment:     1. 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  Dispense: 60 capsule; Refill: 11    2. Bacteremia due to Staphylococcus  - cefadroxil (DURICEF) 500 MG Cap; Take 1 capsule (500 mg total) by mouth every 12 (twelve) hours.  Dispense: 60 capsule; Refill: 11    3. Empyema of lung  - cefadroxil (DURICEF) 500 MG Cap; Take 1 capsule (500 mg total) by mouth every 12 (twelve) hours.  Dispense: 60 capsule; Refill: 11          38 y.o. male with LVAD c/b prior MSSA DLESI with recent admission for COVID, DLESI and MSSA bacteremia c/b L empyema s/p chest tube placement (maintained on 6w ancef, maria isabel 9/14) who is referred to  my clinic for follow up. Pt reports doing well, denies ongoing s/s of infection. Interval improvement in recent CXR from recent admission.       Plan:     -stop ancef after completing last dose  -informed infusion company  -transition to cefadroxil after IV abx - lifelong given prior hx of MSSA bacteremia in setting of LVAD        Follow up in 4w, virtual visit per pt pref    Management of bacteremia/empyema were discussed with patient. Patient was given ample time for questions, all questions answered. Strict return precautions given to patient.         Laura Baldwin MD  Infectious Disease

## 2023-09-19 LAB
EXT ALBUMIN: 4.2
EXT ALT: <5
EXT AST: 19
EXT BILIRUBIN TOTAL: 0.6
EXT BUN: 14
EXT CALCIUM: 9.2
EXT CHLORIDE: 99
EXT CREATININE: 1.23 MG/DL
EXT GLUCOSE: 158
EXT HEMATOCRIT: 30.7
EXT HEMOGLOBIN: 9.9
EXT PLATELETS: 395
EXT POTASSIUM: 3.5
EXT PROTEIN TOTAL: 7.8
EXT SODIUM: 138 MMOL/L
EXT WBC: 9.6

## 2023-09-20 ENCOUNTER — TELEPHONE (OUTPATIENT)
Dept: TRANSPLANT | Facility: CLINIC | Age: 38
End: 2023-09-20
Payer: MEDICAID

## 2023-09-20 ENCOUNTER — ANTI-COAG VISIT (OUTPATIENT)
Dept: CARDIOLOGY | Facility: CLINIC | Age: 38
End: 2023-09-20
Payer: MEDICAID

## 2023-09-20 DIAGNOSIS — Z95.811 LVAD (LEFT VENTRICULAR ASSIST DEVICE) PRESENT: Primary | ICD-10-CM

## 2023-09-20 LAB — INR PPP: 1.6

## 2023-09-20 PROCEDURE — 93793 ANTICOAG MGMT PT WARFARIN: CPT | Mod: ,,,

## 2023-09-20 PROCEDURE — 93793 PR ANTICOAGULANT MGMT FOR PT TAKING WARFARIN: ICD-10-PCS | Mod: ,,,

## 2023-09-20 RX ORDER — ENOXAPARIN SODIUM 100 MG/ML
40 INJECTION SUBCUTANEOUS EVERY 12 HOURS
Qty: 4 ML | Refills: 1 | Status: ON HOLD | OUTPATIENT
Start: 2023-09-20 | End: 2023-11-15 | Stop reason: HOSPADM

## 2023-09-20 NOTE — PROGRESS NOTES
Spoke with patient girlfriend Darlyn Wright regarding recent INR results .  Patient questioned and verified correct dosage . Reported ruddy salad intake - no recent changes .  Also goes to [employer] every Tuesday

## 2023-09-20 NOTE — PROGRESS NOTES
Patient advised of dose instructions and verbalized understanding.  Patient rescheduled appointment from 9/25 to 9/26

## 2023-09-20 NOTE — TELEPHONE ENCOUNTER
External labs entered for Review  Labs completed on 9/19/23  Results routed to VAD Coordinator      MAGNESIUM LEVEL PENDING   BNP PENDING

## 2023-09-20 NOTE — PROGRESS NOTES
INR not at goal and patient with recent infart. Will lovenox bridge and increase dose. Need INR 9/25.

## 2023-09-20 NOTE — TELEPHONE ENCOUNTER
----- Message from Jelly Hummel LPN sent at 11/17/2022 10:04 AM CST -----  Regarding: Weekly milrinone labs--link with pt/inr appointment  [facility] Drawstation (may/may not received results same day) / [employer] [facility] ph [phone]  ----- Message -----  From: Inessa Arana RN  Sent: 9/22/2022   8:42 AM CST  To: [facility]  Subject: Weekly milrinone labs                             [facility] ph [phone]

## 2023-09-21 LAB
ACID FAST MOD KINY STN SPEC: NORMAL
MYCOBACTERIUM SPEC QL CULT: NORMAL

## 2023-09-27 ENCOUNTER — TELEPHONE (OUTPATIENT)
Dept: TRANSPLANT | Facility: CLINIC | Age: 38
End: 2023-09-27
Payer: MEDICAID

## 2023-09-28 LAB — INR PPP: 2.8

## 2023-09-29 ENCOUNTER — ANTI-COAG VISIT (OUTPATIENT)
Dept: CARDIOLOGY | Facility: CLINIC | Age: 38
End: 2023-09-29
Payer: MEDICAID

## 2023-09-29 ENCOUNTER — TELEPHONE (OUTPATIENT)
Dept: TRANSPLANT | Facility: CLINIC | Age: 38
End: 2023-09-29
Payer: MEDICAID

## 2023-09-29 DIAGNOSIS — Z95.811 LVAD (LEFT VENTRICULAR ASSIST DEVICE) PRESENT: Primary | ICD-10-CM

## 2023-09-29 PROCEDURE — 93793 ANTICOAG MGMT PT WARFARIN: CPT | Mod: ,,,

## 2023-09-29 PROCEDURE — 93793 PR ANTICOAGULANT MGMT FOR PT TAKING WARFARIN: ICD-10-PCS | Mod: ,,,

## 2023-09-29 NOTE — TELEPHONE ENCOUNTER
Contacted [facility] to obtain lab results from 9/28/23    Only pt/inr drawn .   [facility] coordinators notified.

## 2023-09-29 NOTE — PROGRESS NOTES
Patient reports not taking lovenox and verified dose as of 6 mg wed/fri ; 4.5 mg all others . No other changes reported.    1:18 PM S/W  Darlyn Wright advised of dose instructions and verbalized understanding.  Patient Darlyn Wright rescheduled appointment from 10/2/23 to 10/5/23.

## 2023-09-29 NOTE — TELEPHONE ENCOUNTER
----- Message from Jelly Hummel LPN sent at 11/17/2022 10:04 AM CST -----  Regarding: Weekly milrinone labs--link with pt/inr appointment  [facility] (may/may not received results same day) / [facility] ph [phone]  ----- Message -----  From: Inessa Arana RN  Sent: 9/22/2022   8:42 AM CST  To: [facility]  Subject: Weekly milrinone labs                             [facility] ph [phone]

## 2023-10-06 ENCOUNTER — ANTI-COAG VISIT (OUTPATIENT)
Dept: CARDIOLOGY | Facility: CLINIC | Age: 38
End: 2023-10-06
Payer: MEDICAID

## 2023-10-06 DIAGNOSIS — Z95.811 LVAD (LEFT VENTRICULAR ASSIST DEVICE) PRESENT: Primary | ICD-10-CM

## 2023-10-06 LAB — INR PPP: 3.1

## 2023-10-06 PROCEDURE — 93793 ANTICOAG MGMT PT WARFARIN: CPT | Mod: ,,,

## 2023-10-06 PROCEDURE — 93793 PR ANTICOAGULANT MGMT FOR PT TAKING WARFARIN: ICD-10-PCS | Mod: ,,,

## 2023-10-06 NOTE — PROGRESS NOTES
Patient advised of dose instructions and verbalized understanding.  Patient rescheduled appointment from 10/10 to 10/12/23 at [facility]   No recent changes reported.

## 2023-10-09 ENCOUNTER — DOCUMENTATION ONLY (OUTPATIENT)
Dept: TRANSPLANT | Facility: CLINIC | Age: 38
End: 2023-10-09
Payer: MEDICAID

## 2023-10-09 ENCOUNTER — TELEPHONE (OUTPATIENT)
Dept: TRANSPLANT | Facility: CLINIC | Age: 38
End: 2023-10-09
Payer: MEDICAID

## 2023-10-09 LAB
EXT ALBUMIN: 4
EXT ALT: 9
EXT AST: 23
EXT BILIRUBIN TOTAL: 0.6
EXT BNP: 289
EXT BUN: 13
EXT CALCIUM: 9
EXT CHLORIDE: 93
EXT CREATININE: 1.44 MG/DL
EXT GLUCOSE: 222
EXT HEMATOCRIT: 33.5
EXT HEMOGLOBIN: 10.1
EXT MAGNESIUM: 1.7
EXT PLATELETS: 360
EXT POTASSIUM: 3.1
EXT PROTEIN TOTAL: 7.5
EXT SODIUM: 136 MMOL/L
EXT WBC: 9

## 2023-10-09 RX ORDER — SPIRONOLACTONE 25 MG/1
25 TABLET ORAL DAILY
Qty: 30 TABLET | Refills: 11 | Status: SHIPPED | OUTPATIENT
Start: 2023-10-09 | End: 2024-10-08

## 2023-10-09 NOTE — PROGRESS NOTES
External labs received, reviewed and entered into Epic. Potassium 3.1. Will call patient to review.

## 2023-10-10 ENCOUNTER — TELEPHONE (OUTPATIENT)
Dept: TRANSPLANT | Facility: CLINIC | Age: 38
End: 2023-10-10
Payer: MEDICAID

## 2023-10-10 NOTE — TELEPHONE ENCOUNTER
Attempted to contact patient regarding instructions, no answer and unable to leave voicemail, plan for team to attempt to contact patient at again.

## 2023-10-10 NOTE — TELEPHONE ENCOUNTER
----- Message from Josh Pulido MD sent at 10/9/2023  5:38 PM CDT -----  I suggest spironolactone 25 mg daily.  Follow up blood test within 1 week after starting.  I will enter prescription.  thx    ----- Message -----  From: Shena Turpin RN  Sent: 10/9/2023   5:06 PM CDT  To: Cally Feliz RN; Inessa Arana RN; #    Hi Josh,  Can we start potassium on Bernice?  K is 3.1.  He's on Lasix 80 daily. No potassium or spironolactone.

## 2023-10-10 NOTE — TELEPHONE ENCOUNTER
Spoke with caregiver regarding equipment maintenance due at upcoming clinic appointment on 10/19/2023.  Asked caregiver to bring MPU and UBC with them to next appointment for maintenance.  Verbalized understanding and agreement.    It is medically necessary to ensure patient has properly functioning equipment and wearables to prevent infection, injury or death to patient.

## 2023-10-12 LAB
EXT ALBUMIN: 3.9
EXT ALT: 7
EXT AST: 24
EXT BILIRUBIN TOTAL: 1
EXT BUN: 11
EXT CALCIUM: 9.1
EXT CHLORIDE: 92
EXT CREATININE: 1.32 MG/DL
EXT GLUCOSE: 165
EXT HEMATOCRIT: 31.5
EXT HEMOGLOBIN: 9.9
EXT MAGNESIUM: 1.8
EXT PLATELETS: 365
EXT POTASSIUM: 3.6
EXT PROTEIN TOTAL: 7.9
EXT SODIUM: 136 MMOL/L
EXT WBC: 7.5

## 2023-10-13 ENCOUNTER — TELEPHONE (OUTPATIENT)
Dept: TRANSPLANT | Facility: CLINIC | Age: 38
End: 2023-10-13
Payer: MEDICAID

## 2023-10-13 ENCOUNTER — ANTI-COAG VISIT (OUTPATIENT)
Dept: CARDIOLOGY | Facility: CLINIC | Age: 38
End: 2023-10-13
Payer: MEDICAID

## 2023-10-13 DIAGNOSIS — Z95.811 LVAD (LEFT VENTRICULAR ASSIST DEVICE) PRESENT: Primary | ICD-10-CM

## 2023-10-13 LAB
EXT BNP: 325
INR PPP: 2.6

## 2023-10-13 PROCEDURE — 93793 PR ANTICOAGULANT MGMT FOR PT TAKING WARFARIN: ICD-10-PCS | Mod: ,,,

## 2023-10-13 PROCEDURE — 93793 ANTICOAG MGMT PT WARFARIN: CPT | Mod: ,,,

## 2023-10-16 ENCOUNTER — TELEPHONE (OUTPATIENT)
Dept: TRANSPLANT | Facility: CLINIC | Age: 38
End: 2023-10-16
Payer: MEDICAID

## 2023-10-16 NOTE — TELEPHONE ENCOUNTER
S/o called, they are out of dressing supplies. States she thought they had more but they are PICC dressing kits. Patient is scheduled for [facility] appointment later this week. Will order 2 boxes of dressings today. Also states they have moved to a new address. Pt demographics updated. Instructed her to bring electric account info to appointment and will need to complete new community contact info. Verbalized understanding.

## 2023-10-18 DIAGNOSIS — Z95.811 LVAD (LEFT VENTRICULAR ASSIST DEVICE) PRESENT: ICD-10-CM

## 2023-10-20 NOTE — PROGRESS NOTES
2:34 PM S/W Darlyn Wright regarding missed labs 10/19/23 rescheduled appointment from 10/19 to 10/25.

## 2023-10-25 RX ORDER — LANOLIN ALCOHOL/MO/W.PET/CERES
400 CREAM (GRAM) TOPICAL DAILY
Qty: 30 TABLET | Refills: 11 | Status: SHIPPED | OUTPATIENT
Start: 2023-10-25

## 2023-10-25 RX ORDER — FUROSEMIDE 80 MG/1
80 TABLET ORAL DAILY
Qty: 30 TABLET | Refills: 11 | Status: SHIPPED | OUTPATIENT
Start: 2023-10-25 | End: 2024-10-24

## 2023-10-25 RX ORDER — LEVETIRACETAM 1000 MG/1
1000 TABLET ORAL 2 TIMES DAILY
Qty: 60 TABLET | Refills: 11 | Status: SHIPPED | OUTPATIENT
Start: 2023-10-25 | End: 2023-12-22

## 2023-10-25 RX ORDER — MIRTAZAPINE 15 MG/1
15 TABLET, FILM COATED ORAL NIGHTLY
Qty: 30 TABLET | Refills: 11 | Status: SHIPPED | OUTPATIENT
Start: 2023-10-25

## 2023-10-27 ENCOUNTER — TELEPHONE (OUTPATIENT)
Dept: TRANSPLANT | Facility: CLINIC | Age: 38
End: 2023-10-27
Payer: MEDICAID

## 2023-10-30 PROBLEM — A41.9 SEPSIS: Status: RESOLVED | Noted: 2023-06-03 | Resolved: 2023-10-30

## 2023-10-30 PROBLEM — A41.9 SEVERE SEPSIS: Status: RESOLVED | Noted: 2023-07-28 | Resolved: 2023-10-30

## 2023-10-30 PROBLEM — R65.20 SEVERE SEPSIS: Status: RESOLVED | Noted: 2023-07-28 | Resolved: 2023-10-30

## 2023-10-31 LAB — INR PPP: 2.3

## 2023-11-01 ENCOUNTER — ANTI-COAG VISIT (OUTPATIENT)
Dept: CARDIOLOGY | Facility: CLINIC | Age: 38
End: 2023-11-01
Payer: MEDICAID

## 2023-11-01 DIAGNOSIS — Z95.811 LVAD (LEFT VENTRICULAR ASSIST DEVICE) PRESENT: ICD-10-CM

## 2023-11-01 DIAGNOSIS — Z79.01 ANTICOAGULANT LONG-TERM USE: Primary | ICD-10-CM

## 2023-11-01 PROCEDURE — 93793 PR ANTICOAGULANT MGMT FOR PT TAKING WARFARIN: ICD-10-PCS | Mod: ,,,

## 2023-11-01 PROCEDURE — 93793 ANTICOAG MGMT PT WARFARIN: CPT | Mod: ,,,

## 2023-11-02 ENCOUNTER — TELEPHONE (OUTPATIENT)
Dept: TRANSPLANT | Facility: CLINIC | Age: 38
End: 2023-11-02
Payer: MEDICAID

## 2023-11-02 ENCOUNTER — PATIENT MESSAGE (OUTPATIENT)
Dept: TRANSPLANT | Facility: CLINIC | Age: 38
End: 2023-11-02
Payer: MEDICAID

## 2023-11-02 LAB
EXT ALBUMIN: 3.8
EXT ALT: 8
EXT AST: 20
EXT BILIRUBIN TOTAL: 0.8
EXT BNP: 528
EXT BUN: 12
EXT CALCIUM: 9.2
EXT CHLORIDE: 103
EXT CREATININE: 1.38 MG/DL
EXT GLUCOSE: 189
EXT HEMATOCRIT: 33.1
EXT HEMOGLOBIN: 9.9
EXT MAGNESIUM: 1.7
EXT PLATELETS: 352
EXT POTASSIUM: 4
EXT PROTEIN TOTAL: 7.2
EXT SODIUM: 139 MMOL/L
EXT WBC: 10.3

## 2023-11-02 NOTE — TELEPHONE ENCOUNTER
Page called to report she missed mu call.  Asked her about Kevan taking his lasix and how I was told he doesn't take it.  She said she will talk with him but she can tell he is fluid overloaded.  I also told her it sounds like he needs a new PICC line but needs to be seen in clinic so we can charge his for dressing supplies and then order more dressing supplies so they don't run out.  I told her we could probably see him in clinic an d admit for a new PICC.  Asked Leni to schedule for next week if possible.

## 2023-11-02 NOTE — TELEPHONE ENCOUNTER
Labs received, not able to get in touch with pt or S.O. Called infusion company to discuss care with them. Spoke with local person and then called [phone], spoke with Vandana.  (They had additional numbers for pt. [phone], [phone])     I was told he missed 2 weeks of appts.  He was there 10/31/23.  BNP elevated.  RN reports he was more fatigued and SOB on Monday.  He is 215 lbs.   She said he reports not taking his lasix due to cramping.  PICC line looks like it is out.  Kevan told RN it was placed like that.  I asked if PICC can be replaced locally and was told no, not with primacor.    I asked if she will call him and advise him to come here to get new PICC.  Vandana said she would call them.  Hopefully he will answer the call for her.

## 2023-11-03 ENCOUNTER — PATIENT MESSAGE (OUTPATIENT)
Dept: CARDIOTHORACIC SURGERY | Facility: CLINIC | Age: 38
End: 2023-11-03
Payer: MEDICAID

## 2023-11-03 ENCOUNTER — TELEPHONE (OUTPATIENT)
Dept: TRANSPLANT | Facility: CLINIC | Age: 38
End: 2023-11-03
Payer: MEDICAID

## 2023-11-03 NOTE — TELEPHONE ENCOUNTER
Spoke with caregiver regarding equipment maintenance due at upcoming clinic appointment on 11/8/2023.  Asked caregiver to bring MPU and UBC with them to next appointment for maintenance.  Verbalized understanding and agreement.    It is medically necessary to ensure patient has properly functioning equipment and wearables to prevent infection, injury or death to patient.

## 2023-11-03 NOTE — TELEPHONE ENCOUNTER
Returned call and spoke to patient's fianceRosage. She was asking about appt time and if he needed labs since being admitted for his PICC line. While going over appts, she told me that while he is admitted for his PICC line, can his ICD be looked at because it is coming out of his skin. She states that she is able to see the ICD and it has been like this for 3-4 days.     Robyn sent me a picture, see below. I informed that patient needs to get to the ER now and be transferred to [facility]. I informed Dr. Villatoro and Inessa Arana, on-call VC.       Robyn called back and stated that they will report to the ER after her birthday celebration this evening.         ----- Message from Justin Cleaning sent at 11/3/2023  2:41 PM CDT -----  Regarding: Rita Farrar  Subject: Artemio: Leni                                       Pt would like Leni to call him back.     Contact @ [phone]     Thanks

## 2023-11-08 ENCOUNTER — DOCUMENTATION ONLY (OUTPATIENT)
Dept: CARDIOLOGY | Facility: HOSPITAL | Age: 38
End: 2023-11-08
Payer: MEDICAID

## 2023-11-08 ENCOUNTER — HOSPITAL ENCOUNTER (INPATIENT)
Facility: HOSPITAL | Age: 38
LOS: 7 days | Discharge: HOME HEALTH | DRG: 261 | End: 2023-11-15
Attending: STUDENT IN AN ORGANIZED HEALTH CARE EDUCATION/TRAINING PROGRAM | Admitting: INTERNAL MEDICINE
Payer: MEDICAID

## 2023-11-08 DIAGNOSIS — Z79.4 TYPE 2 DIABETES MELLITUS WITH HYPERGLYCEMIA, WITH LONG-TERM CURRENT USE OF INSULIN: ICD-10-CM

## 2023-11-08 DIAGNOSIS — L03.90 CELLULITIS, UNSPECIFIED CELLULITIS SITE: ICD-10-CM

## 2023-11-08 DIAGNOSIS — T82.7XXA INFECTION INVOLVING IMPLANTABLE CARDIOVERTER-DEFIBRILLATOR (ICD), INITIAL ENCOUNTER: Primary | ICD-10-CM

## 2023-11-08 DIAGNOSIS — I50.43 ACUTE ON CHRONIC COMBINED SYSTOLIC AND DIASTOLIC HEART FAILURE, NYHA CLASS 4: ICD-10-CM

## 2023-11-08 DIAGNOSIS — B99.9 INFECTION: ICD-10-CM

## 2023-11-08 DIAGNOSIS — T82.118A ICD (IMPLANTABLE CARDIOVERTER-DEFIBRILLATOR) MALFUNCTION: ICD-10-CM

## 2023-11-08 DIAGNOSIS — I50.814 RIGHT HEART FAILURE SECONDARY TO LEFT HEART FAILURE: ICD-10-CM

## 2023-11-08 DIAGNOSIS — T82.118D MALFUNCTION OF IMPLANTABLE CARDIOVERTER-DEFIBRILLATOR (ICD), SUBSEQUENT ENCOUNTER: ICD-10-CM

## 2023-11-08 DIAGNOSIS — T82.118A MALFUNCTION OF IMPLANTABLE CARDIOVERTER-DEFIBRILLATOR (ICD), INITIAL ENCOUNTER: ICD-10-CM

## 2023-11-08 DIAGNOSIS — T82.7XXA: ICD-10-CM

## 2023-11-08 DIAGNOSIS — T82.7XXA ICD (IMPLANTABLE CARDIOVERTER-DEFIBRILLATOR) INFECTION: ICD-10-CM

## 2023-11-08 DIAGNOSIS — E11.65 TYPE 2 DIABETES MELLITUS WITH HYPERGLYCEMIA, UNSPECIFIED WHETHER LONG TERM INSULIN USE: ICD-10-CM

## 2023-11-08 DIAGNOSIS — T82.9XXA: ICD-10-CM

## 2023-11-08 DIAGNOSIS — Z95.811 LVAD (LEFT VENTRICULAR ASSIST DEVICE) PRESENT: ICD-10-CM

## 2023-11-08 DIAGNOSIS — T82.9XXA COMPLICATION DUE TO IMPLANTABLE CARDIOVERTER-DEFIBRILLATOR (ICD), INITIAL ENCOUNTER: ICD-10-CM

## 2023-11-08 DIAGNOSIS — I42.0 FAMILIAL DILATED CARDIOMYOPATHY: ICD-10-CM

## 2023-11-08 DIAGNOSIS — T82.118S MALFUNCTION OF IMPLANTABLE CARDIOVERTER-DEFIBRILLATOR (ICD), SEQUELA: ICD-10-CM

## 2023-11-08 DIAGNOSIS — E11.65 TYPE 2 DIABETES MELLITUS WITH HYPERGLYCEMIA, WITH LONG-TERM CURRENT USE OF INSULIN: ICD-10-CM

## 2023-11-08 LAB
ALBUMIN SERPL BCP-MCNC: 3.4 G/DL (ref 3.5–5.2)
ALLENS TEST: NORMAL
ALP SERPL-CCNC: 127 U/L (ref 55–135)
ALT SERPL W/O P-5'-P-CCNC: 11 U/L (ref 10–44)
ANION GAP SERPL CALC-SCNC: 14 MMOL/L (ref 8–16)
AST SERPL-CCNC: 30 U/L (ref 10–40)
BACTERIA #/AREA URNS AUTO: NORMAL /HPF
BASOPHILS # BLD AUTO: 0.05 K/UL (ref 0–0.2)
BASOPHILS NFR BLD: 0.5 % (ref 0–1.9)
BILIRUB SERPL-MCNC: 1.7 MG/DL (ref 0.1–1)
BILIRUB UR QL STRIP: NEGATIVE
BNP SERPL-MCNC: 300 PG/ML (ref 0–99)
BUN SERPL-MCNC: 18 MG/DL (ref 6–20)
CALCIUM SERPL-MCNC: 9.3 MG/DL (ref 8.7–10.5)
CHLORIDE SERPL-SCNC: 96 MMOL/L (ref 95–110)
CLARITY UR REFRACT.AUTO: CLEAR
CO2 SERPL-SCNC: 27 MMOL/L (ref 23–29)
COLOR UR AUTO: YELLOW
CREAT SERPL-MCNC: 1.5 MG/DL (ref 0.5–1.4)
DIFFERENTIAL METHOD: ABNORMAL
EOSINOPHIL # BLD AUTO: 0 K/UL (ref 0–0.5)
EOSINOPHIL NFR BLD: 0.3 % (ref 0–8)
ERYTHROCYTE [DISTWIDTH] IN BLOOD BY AUTOMATED COUNT: 20.8 % (ref 11.5–14.5)
EST. GFR  (NO RACE VARIABLE): >60 ML/MIN/1.73 M^2
GLUCOSE SERPL-MCNC: 177 MG/DL (ref 70–110)
GLUCOSE UR QL STRIP: NEGATIVE
HCT VFR BLD AUTO: 31.5 % (ref 40–54)
HCV AB SERPL QL IA: NORMAL
HGB BLD-MCNC: 9.8 G/DL (ref 14–18)
HGB UR QL STRIP: ABNORMAL
HIV 1+2 AB+HIV1 P24 AG SERPL QL IA: NORMAL
HYALINE CASTS UR QL AUTO: 0 /LPF
IMM GRANULOCYTES # BLD AUTO: 0.02 K/UL (ref 0–0.04)
IMM GRANULOCYTES NFR BLD AUTO: 0.2 % (ref 0–0.5)
INR PPP: 2.9 (ref 0.8–1.2)
KETONES UR QL STRIP: NEGATIVE
LDH SERPL L TO P-CCNC: 1.41 MMOL/L (ref 0.5–2.2)
LEUKOCYTE ESTERASE UR QL STRIP: NEGATIVE
LYMPHOCYTES # BLD AUTO: 2.1 K/UL (ref 1–4.8)
LYMPHOCYTES NFR BLD: 23.2 % (ref 18–48)
MAGNESIUM SERPL-MCNC: 1.6 MG/DL (ref 1.6–2.6)
MCH RBC QN AUTO: 22.8 PG (ref 27–31)
MCHC RBC AUTO-ENTMCNC: 31.1 G/DL (ref 32–36)
MCV RBC AUTO: 73 FL (ref 82–98)
MICROSCOPIC COMMENT: NORMAL
MONOCYTES # BLD AUTO: 0.7 K/UL (ref 0.3–1)
MONOCYTES NFR BLD: 7.4 % (ref 4–15)
NEUTROPHILS # BLD AUTO: 6.3 K/UL (ref 1.8–7.7)
NEUTROPHILS NFR BLD: 68.4 % (ref 38–73)
NITRITE UR QL STRIP: NEGATIVE
NRBC BLD-RTO: 0 /100 WBC
PH UR STRIP: 6 [PH] (ref 5–8)
PLATELET # BLD AUTO: 350 K/UL (ref 150–450)
PMV BLD AUTO: 9.7 FL (ref 9.2–12.9)
POCT GLUCOSE: 266 MG/DL (ref 70–110)
POTASSIUM SERPL-SCNC: 3.3 MMOL/L (ref 3.5–5.1)
PROT SERPL-MCNC: 8.2 G/DL (ref 6–8.4)
PROT UR QL STRIP: ABNORMAL
PROTHROMBIN TIME: 28.8 SEC (ref 9–12.5)
RBC # BLD AUTO: 4.29 M/UL (ref 4.6–6.2)
RBC #/AREA URNS AUTO: 1 /HPF (ref 0–4)
SAMPLE: NORMAL
SITE: NORMAL
SODIUM SERPL-SCNC: 137 MMOL/L (ref 136–145)
SP GR UR STRIP: 1.03 (ref 1–1.03)
SQUAMOUS #/AREA URNS AUTO: 1 /HPF
URN SPEC COLLECT METH UR: ABNORMAL
WBC # BLD AUTO: 9.21 K/UL (ref 3.9–12.7)
WBC #/AREA URNS AUTO: 2 /HPF (ref 0–5)

## 2023-11-08 PROCEDURE — 99223 PR INITIAL HOSPITAL CARE,LEVL III: ICD-10-PCS | Mod: ,,, | Performed by: INTERNAL MEDICINE

## 2023-11-08 PROCEDURE — 63600175 PHARM REV CODE 636 W HCPCS: Performed by: STUDENT IN AN ORGANIZED HEALTH CARE EDUCATION/TRAINING PROGRAM

## 2023-11-08 PROCEDURE — 25000003 PHARM REV CODE 250: Performed by: PHYSICIAN ASSISTANT

## 2023-11-08 PROCEDURE — 96365 THER/PROPH/DIAG IV INF INIT: CPT

## 2023-11-08 PROCEDURE — 87389 HIV-1 AG W/HIV-1&-2 AB AG IA: CPT | Performed by: PHYSICIAN ASSISTANT

## 2023-11-08 PROCEDURE — 25000003 PHARM REV CODE 250

## 2023-11-08 PROCEDURE — 99285 EMERGENCY DEPT VISIT HI MDM: CPT | Mod: 25

## 2023-11-08 PROCEDURE — 99223 1ST HOSP IP/OBS HIGH 75: CPT | Mod: ,,, | Performed by: INTERNAL MEDICINE

## 2023-11-08 PROCEDURE — 93005 ELECTROCARDIOGRAM TRACING: CPT

## 2023-11-08 PROCEDURE — 83605 ASSAY OF LACTIC ACID: CPT

## 2023-11-08 PROCEDURE — 99223 1ST HOSP IP/OBS HIGH 75: CPT | Mod: ,,, | Performed by: STUDENT IN AN ORGANIZED HEALTH CARE EDUCATION/TRAINING PROGRAM

## 2023-11-08 PROCEDURE — 93010 ELECTROCARDIOGRAM REPORT: CPT | Mod: ,,, | Performed by: INTERNAL MEDICINE

## 2023-11-08 PROCEDURE — 99900035 HC TECH TIME PER 15 MIN (STAT)

## 2023-11-08 PROCEDURE — 86803 HEPATITIS C AB TEST: CPT | Performed by: PHYSICIAN ASSISTANT

## 2023-11-08 PROCEDURE — 93010 EKG 12-LEAD: ICD-10-PCS | Mod: ,,, | Performed by: INTERNAL MEDICINE

## 2023-11-08 PROCEDURE — 63600175 PHARM REV CODE 636 W HCPCS: Performed by: PHYSICIAN ASSISTANT

## 2023-11-08 PROCEDURE — 27000248 HC VAD-ADDITIONAL DAY

## 2023-11-08 PROCEDURE — 85610 PROTHROMBIN TIME: CPT | Performed by: STUDENT IN AN ORGANIZED HEALTH CARE EDUCATION/TRAINING PROGRAM

## 2023-11-08 PROCEDURE — 83880 ASSAY OF NATRIURETIC PEPTIDE: CPT

## 2023-11-08 PROCEDURE — 80053 COMPREHEN METABOLIC PANEL: CPT

## 2023-11-08 PROCEDURE — 63600175 PHARM REV CODE 636 W HCPCS

## 2023-11-08 PROCEDURE — 85025 COMPLETE CBC W/AUTO DIFF WBC: CPT

## 2023-11-08 PROCEDURE — 20600001 HC STEP DOWN PRIVATE ROOM

## 2023-11-08 PROCEDURE — 87040 BLOOD CULTURE FOR BACTERIA: CPT

## 2023-11-08 PROCEDURE — 25000003 PHARM REV CODE 250: Performed by: STUDENT IN AN ORGANIZED HEALTH CARE EDUCATION/TRAINING PROGRAM

## 2023-11-08 PROCEDURE — 81001 URINALYSIS AUTO W/SCOPE: CPT

## 2023-11-08 PROCEDURE — 99223 PR INITIAL HOSPITAL CARE,LEVL III: ICD-10-PCS | Mod: ,,, | Performed by: STUDENT IN AN ORGANIZED HEALTH CARE EDUCATION/TRAINING PROGRAM

## 2023-11-08 PROCEDURE — 83735 ASSAY OF MAGNESIUM: CPT

## 2023-11-08 RX ORDER — PANTOPRAZOLE SODIUM 40 MG/1
40 TABLET, DELAYED RELEASE ORAL DAILY
Status: DISCONTINUED | OUTPATIENT
Start: 2023-11-09 | End: 2023-11-15 | Stop reason: HOSPADM

## 2023-11-08 RX ORDER — ATORVASTATIN CALCIUM 20 MG/1
40 TABLET, FILM COATED ORAL DAILY
Status: DISCONTINUED | OUTPATIENT
Start: 2023-11-09 | End: 2023-11-15 | Stop reason: HOSPADM

## 2023-11-08 RX ORDER — SPIRONOLACTONE 25 MG/1
25 TABLET ORAL DAILY
Status: DISCONTINUED | OUTPATIENT
Start: 2023-11-09 | End: 2023-11-15 | Stop reason: HOSPADM

## 2023-11-08 RX ORDER — ASPIRIN 81 MG/1
81 TABLET ORAL DAILY
Status: DISCONTINUED | OUTPATIENT
Start: 2023-11-09 | End: 2023-11-15 | Stop reason: HOSPADM

## 2023-11-08 RX ORDER — IBUPROFEN 200 MG
24 TABLET ORAL
Status: DISCONTINUED | OUTPATIENT
Start: 2023-11-08 | End: 2023-11-10

## 2023-11-08 RX ORDER — LEVETIRACETAM 500 MG/1
1000 TABLET ORAL 2 TIMES DAILY
Status: DISCONTINUED | OUTPATIENT
Start: 2023-11-08 | End: 2023-11-15 | Stop reason: HOSPADM

## 2023-11-08 RX ORDER — WARFARIN 1 MG/1
4 TABLET ORAL DAILY
Status: DISCONTINUED | OUTPATIENT
Start: 2023-11-08 | End: 2023-11-10

## 2023-11-08 RX ORDER — FUROSEMIDE 80 MG/1
80 TABLET ORAL DAILY
Status: DISCONTINUED | OUTPATIENT
Start: 2023-11-08 | End: 2023-11-15 | Stop reason: HOSPADM

## 2023-11-08 RX ORDER — INSULIN ASPART 100 [IU]/ML
0-5 INJECTION, SOLUTION INTRAVENOUS; SUBCUTANEOUS
Status: DISCONTINUED | OUTPATIENT
Start: 2023-11-08 | End: 2023-11-10

## 2023-11-08 RX ORDER — GLUCAGON 1 MG
1 KIT INJECTION
Status: DISCONTINUED | OUTPATIENT
Start: 2023-11-08 | End: 2023-11-10

## 2023-11-08 RX ORDER — SODIUM CHLORIDE 0.9 % (FLUSH) 0.9 %
10 SYRINGE (ML) INJECTION
Status: DISCONTINUED | OUTPATIENT
Start: 2023-11-08 | End: 2023-11-15 | Stop reason: HOSPADM

## 2023-11-08 RX ORDER — MILRINONE LACTATE 0.2 MG/ML
0.25 INJECTION, SOLUTION INTRAVENOUS CONTINUOUS
Status: DISCONTINUED | OUTPATIENT
Start: 2023-11-08 | End: 2023-11-15 | Stop reason: HOSPADM

## 2023-11-08 RX ORDER — MAGNESIUM SULFATE HEPTAHYDRATE 40 MG/ML
2 INJECTION, SOLUTION INTRAVENOUS ONCE
Status: COMPLETED | OUTPATIENT
Start: 2023-11-08 | End: 2023-11-08

## 2023-11-08 RX ORDER — POTASSIUM CHLORIDE 20 MEQ/1
40 TABLET, EXTENDED RELEASE ORAL EVERY 4 HOURS
Status: COMPLETED | OUTPATIENT
Start: 2023-11-08 | End: 2023-11-08

## 2023-11-08 RX ORDER — LANOLIN ALCOHOL/MO/W.PET/CERES
400 CREAM (GRAM) TOPICAL DAILY
Status: DISCONTINUED | OUTPATIENT
Start: 2023-11-09 | End: 2023-11-11

## 2023-11-08 RX ORDER — ACETAMINOPHEN 325 MG/1
650 TABLET ORAL EVERY 6 HOURS PRN
Status: DISCONTINUED | OUTPATIENT
Start: 2023-11-08 | End: 2023-11-15 | Stop reason: HOSPADM

## 2023-11-08 RX ORDER — MIRTAZAPINE 15 MG/1
15 TABLET, FILM COATED ORAL NIGHTLY
Status: DISCONTINUED | OUTPATIENT
Start: 2023-11-08 | End: 2023-11-15 | Stop reason: HOSPADM

## 2023-11-08 RX ORDER — IBUPROFEN 200 MG
16 TABLET ORAL
Status: DISCONTINUED | OUTPATIENT
Start: 2023-11-08 | End: 2023-11-10

## 2023-11-08 RX ADMIN — PIPERACILLIN SODIUM AND TAZOBACTAM SODIUM 4.5 G: 4; .5 INJECTION, POWDER, FOR SOLUTION INTRAVENOUS at 10:11

## 2023-11-08 RX ADMIN — INSULIN ASPART 3 UNITS: 100 INJECTION, SOLUTION INTRAVENOUS; SUBCUTANEOUS at 07:11

## 2023-11-08 RX ADMIN — POTASSIUM CHLORIDE 40 MEQ: 1500 TABLET, EXTENDED RELEASE ORAL at 10:11

## 2023-11-08 RX ADMIN — MILRINONE LACTATE IN DEXTROSE 0.25 MCG/KG/MIN: 200 INJECTION, SOLUTION INTRAVENOUS at 05:11

## 2023-11-08 RX ADMIN — POTASSIUM CHLORIDE 40 MEQ: 1500 TABLET, EXTENDED RELEASE ORAL at 08:11

## 2023-11-08 RX ADMIN — FUROSEMIDE 80 MG: 80 TABLET ORAL at 05:11

## 2023-11-08 RX ADMIN — VANCOMYCIN HYDROCHLORIDE 2000 MG: 500 INJECTION, POWDER, LYOPHILIZED, FOR SOLUTION INTRAVENOUS at 04:11

## 2023-11-08 RX ADMIN — PIPERACILLIN SODIUM AND TAZOBACTAM SODIUM 4.5 G: 4; .5 INJECTION, POWDER, FOR SOLUTION INTRAVENOUS at 03:11

## 2023-11-08 RX ADMIN — MAGNESIUM SULFATE HEPTAHYDRATE 2 G: 40 INJECTION, SOLUTION INTRAVENOUS at 08:11

## 2023-11-08 NOTE — ASSESSMENT & PLAN NOTE
Pts [employer] AICD is falling out of L chest wall.  Unclear reason for dehiscence of ICD site  BCx's pending. Continue Zosyn.   Will consult both ID and EP. ICD extraction scheduled tentatively for next week.

## 2023-11-08 NOTE — PROGRESS NOTES
Cardiac device interrogation and/or reprogramming completed by industry representative, Awais with [employer] please refer to report located in the Media tab.

## 2023-11-08 NOTE — H&P
[facility] - Emergency Dept  Heart Transplant  H&P    Patient Name: Kevan Queen  MRN: [id number]  Admission Date: 11/8/2023  Attending Physician: Griffin Horta MD  Primary Care Provider: Rosio Armendariz FNP  Principal Problem:ICD (implantable cardioverter-defibrillator) malfunction    Subjective:     History of Present Illness:  Kevan Queen is a 38 y.o. male on LVAD presenting with c/o defibrillator coming out of his chest. He stated that this started a few months ago. He also c/o that his picc line looks longer that the previous ones he has had. The picc line is functioning and there is no irritation around it. He uses it daily for his milrinone infusion. He states that his defibrillator has been coming out of his chest for around a week now. He denied chest trauma, fever, nausea, vomiting or diarrhea. Breathing is at baseline and comfortable at rest. Denies orthopnea or PND or LVAD alarms.          Past Medical History:   Diagnosis Date    Acute respiratory failure with hypoxia 7/22/2023    Arthritis     Awareness alteration, transient 9/1/2022    Cardiomyopathy     CHF (congestive heart failure) 10/01/2020    COVID-19 6/3/2023    Diabetes mellitus     Dilated cardiomyopathy 10/26/2020    Drug abuse 10/2020    Headache 4/19/2023    Hyperglycemia 12/16/2022    Hyperosmolar hyperglycemic state (HHS) 5/25/2022    ICD (implantable cardioverter-defibrillator) in place 10/26/2020    Left ventricular assist device (LVAD) complication, initial encounter 6/5/2023    Muscle cramping 6/15/2022    Renal disorder     SOB (shortness of breath) 6/13/2022    Tingling in extremities 7/13/2022       Past Surgical History:   Procedure Laterality Date    APPLICATION OF WOUND VACUUM-ASSISTED CLOSURE DEVICE N/A 6/30/2022    Procedure: APPLICATION, WOUND VAC;  Surgeon: Luis F Paige MD;  Location: [facility] OR [address] [place] [address];  Service: Cardiovascular;  Laterality: N/A;  50 x 5 cm    CARDIAC DEFIBRILLATOR PLACEMENT      IMPLANTATION OF RIGHT  VENTRICULAR ASSIST DEVICE (RVAD) N/A 6/29/2022    Procedure: INSERTION, RVAD;  Surgeon: Luis F Paige MD;  [facility];  Service: Cardiovascular;  Laterality: N/A;    INSERTION OF GRAFT TO PERICARDIUM Right 6/30/2022    Procedure: INSERTION-RIGHT VENTRICULAR ASSIST DEVICE;  Surgeon: Luis F Paige MD;  Location: [facility] OR [facility];  Service: Cardiovascular;  Laterality: Right;    IRRIGATION OF MEDIASTINUM  6/30/2022    Procedure: IRRIGATION, MEDIASTINUM;  Surgeon: Luis F Paige MD;  Location: [facility] OR [facility];  Service: Cardiovascular;;    LEFT VENTRICULAR ASSIST DEVICE Left 6/23/2022    Procedure: INSERTION-LEFT VENTRICULAR ASSIST DEVICE;  Surgeon: Luis F Paige MD;  [facility];  Service: Cardiovascular;  Laterality: Left;    LEFT VENTRICULAR ASSIST DEVICE N/A 6/29/2022    Procedure: INSERTION-LEFT VENTRICULAR ASSIST DEVICE;  Surgeon: Luis F Paige MD;  [facility];  Service: Cardiovascular;  Laterality: N/A;    RIGHT HEART CATHETERIZATION Right 4/8/2022    Procedure: INSERTION, CATHETER, RIGHT HEART;  Surgeon: Luca Lopez Jr., MD;  Location: [facility] CATH LAB;  Service: Cardiology;  Laterality: Right;    RIGHT HEART CATHETERIZATION Right 4/19/2022    Procedure: INSERTION, CATHETER, RIGHT HEART;  Surgeon: Josh Pulido MD;  Location: [facility] CATH LAB;  Service: Cardiology;  Laterality: Right;    RIGHT HEART CATHETERIZATION Right 7/21/2022    Procedure: INSERTION, CATHETER, RIGHT HEART;  Surgeon: Dalia Crum MD;  Location: [facility] CATH LAB;  Service: Cardiology;  Laterality: Right;    RIGHT HEART CATHETERIZATION Right 10/31/2022    Procedure: INSERTION, CATHETER, RIGHT HEART;  Surgeon: Dalia Crum MD;  Location: [facility] CATH LAB;  Service: Cardiology;  Laterality: Right;    STERNAL WOUND CLOSURE N/A 6/30/2022    Procedure: CLOSURE, WOUND, STERNUM;  Surgeon: Luis F Paige MD;  [facility];  Service: Cardiovascular;  Laterality: N/A;       Review of  patient's allergies indicates:   Allergen Reactions    Bumex [bumetanide] Hives    Torsemide Hives       Current Facility-Administered Medications   Medication    piperacillin-tazobactam (ZOSYN) 4.5 g in dextrose 5 % in water (D5W) 100 mL IVPB (MB+)    vancomycin 2 g in dextrose 5 % 500 mL IVPB     Current Outpatient Medications   Medication Sig    acetaminophen (TYLENOL) 325 MG tablet Take 2 tablets (650 mg total) by mouth every 6 (six) hours as needed for Pain.    aspirin (ECOTRIN) 81 MG EC tablet Take 1 tablet (81 mg total) by mouth once daily.    atorvastatin (LIPITOR) 40 MG tablet Take 1 tablet (40 mg total) by mouth once daily.    blood sugar diagnostic Strp Use to test blood glucose 4 (four) times daily.    blood-glucose meter Misc Use as instructed    busPIRone (BUSPAR) 10 MG tablet Take 10 mg by mouth 2 (two) times daily.    cefadroxil (DURICEF) 500 MG Cap Take 1 capsule (500 mg total) by mouth every 12 (twelve) hours.    enoxaparin (LOVENOX) 40 mg/0.4 mL Syrg Inject 0.4 mLs (40 mg total) into the skin every 12 (twelve) hours.    furosemide (LASIX) 80 MG tablet Take 1 tablet (80 mg total) by mouth once daily.    insulin aspart U-100 (NOVOLOG) 100 unit/mL (3 mL) InPn pen Inject 6 Units into the skin 3 (three) times daily with meals. Plus Sliding Scale: 151-200: +1, 201-250: +2, 251-300: +3, 301-350: +4 and call MD    insulin detemir U-100, Levemir, 100 unit/mL (3 mL) SubQ InPn pen Inject 8 Units into the skin 2 (two) times daily.    lancets 33 gauge Misc Use to test blood glucose 4 (four) times daily.    levETIRAcetam (KEPPRA) 1000 MG tablet TAKE 1 TABLET(1000 MG) BY MOUTH TWICE DAILY    magnesium oxide (MAG-OX) 400 mg (241.3 mg magnesium) tablet Take 1 tablet (400 mg total) by mouth once daily.    milrinone 20mg/100ml D5W, 200mcg/ml, (PRIMACOR) 20 mg/100 mL (200 mcg/mL) infusion Inject 23.6 mcg/min into the vein continuous.    mirtazapine (REMERON) 15 MG tablet Take 1 tablet (15 mg total) by mouth nightly.  "   pantoprazole (PROTONIX) 40 MG tablet Take 1 tablet (40 mg total) by mouth once daily.    pen needle, diabetic 32 gauge x " Ndle Use to inject insulin 5 (five) times daily.    polyethylene glycol (GLYCOLAX) 17 gram PwPk Take 17 g by mouth once daily.    senna (SENOKOT) 8.6 mg tablet Take 1 tablet by mouth once daily.    spironolactone (ALDACTONE) 25 MG tablet Take 1 tablet (25 mg total) by mouth once daily.    valsartan (DIOVAN) 40 MG tablet Take 1 tablet (40 mg total) by mouth 2 (two) times daily.    warfarin (COUMADIN) 3 MG tablet Take 1.5-2 tablets (4.5-6 mg total) by mouth Daily. Take 1.5 tablets (4.5mg) orally daily, except 2 tablets (6mg) on  and      Family History       Problem Relation (Age of Onset)    Diverticulosis Brother    Heart attack Maternal Grandmother, Maternal Grandfather    Heart failure Father          Tobacco Use    Smoking status: Former     Current packs/day: 0.00     Average packs/day: 0.5 packs/day for 16.6 years (8.3 ttl pk-yrs)     Types: Cigarettes     Start date: 10/1/2004     Quit date: 2021     Years since quittin.5    Smokeless tobacco: Never   Substance and Sexual Activity    Alcohol use: Not Currently    Drug use: Not Currently     Types: Marijuana, MDMA (Ecstacy)    Sexual activity: Yes     Partners: Female     Birth control/protection: None     Review of Systems   Respiratory:  Negative for chest tightness and shortness of breath.    Cardiovascular:  Negative for chest pain, palpitations and leg swelling.   Gastrointestinal:  Negative for abdominal distention and abdominal pain.     Objective:     Vital Signs (Most Recent):  Temp: 98.1 °F (36.7 °C) (23 1252)  Pulse: (!) 115 (23 1252)  Resp: 18 (23 125)  SpO2: 99 % (23 125) Vital Signs (24h Range):  Temp:  [98.1 °F (36.7 °C)] 98.1 °F (36.7 °C)  Pulse:  [115] 115  Resp:  [18] 18  SpO2:  [99 %] 99 %     Patient Vitals for the past 72 hrs (Last 3 readings):   Weight " "  11/08/23 1252 94.3 kg (208 lb)     Body mass index is 26 kg/m².    No intake or output data in the 24 hours ending 11/08/23 1613       Physical Exam  Constitutional:       Appearance: Normal appearance.   HENT:      Head: Normocephalic and atraumatic.   Neck:      Vascular: No JVD.   Cardiovascular:      Rate and Rhythm: Normal rate and regular rhythm.      Pulses: Normal pulses.      Heart sounds: Normal heart sounds.      Comments: Smooth lvad hum.   Pulmonary:      Effort: Pulmonary effort is normal.      Breath sounds: Normal breath sounds.   Chest:      Comments: [employer] defibrillator is sticking out of L chest wall.   Abdominal:      General: Bowel sounds are normal. There is no distension.      Palpations: Abdomen is soft.      Tenderness: There is no abdominal tenderness.   Musculoskeletal:         General: Normal range of motion.      Cervical back: Normal range of motion and neck supple.   Skin:     General: Skin is warm and dry.      Capillary Refill: Capillary refill takes less than 2 seconds.   Neurological:      General: No focal deficit present.      Mental Status: He is alert and oriented to person, place, and time.   Psychiatric:         Mood and Affect: Mood normal.         Behavior: Behavior normal.            Significant Labs:  CBC:  Recent Labs   Lab 11/08/23  1407   WBC 9.21   RBC 4.29*   HGB 9.8*   HCT 31.5*      MCV 73*   MCH 22.8*   MCHC 31.1*     BNP:  No results for input(s): "BNP" in the last 168 hours.    Invalid input(s): "BNPTRIAGELBLO"  CMP:  Recent Labs   Lab 11/08/23  1407   *   CALCIUM 9.3   ALBUMIN 3.4*   PROT 8.2      K 3.3*   CO2 27   CL 96   BUN 18   CREATININE 1.5*   ALKPHOS 127   ALT 11   AST 30   BILITOT 1.7*      Coagulation:   Recent Labs   Lab 11/08/23  1420   INR 2.9*     LDH:  No results for input(s): "LDH" in the last 72 hours.  Microbiology:  Microbiology Results (last 7 days)       Procedure Component Value Units Date/Time    Blood " culture x two cultures. Draw prior to antibiotics. [[id number]] Collected: 11/08/23 1408    Order Status: Sent Specimen: Blood from Peripheral, Antecubital, Left Updated: 11/08/23 1453    Blood culture x two cultures. Draw prior to antibiotics. [[id number]] Collected: 11/08/23 1407    Order Status: Sent Specimen: Blood from Peripheral, Lower Arm, Left Updated: 11/08/23 1453            I have reviewed all pertinent labs within the past 24 hours.    Diagnostic Results:  I have reviewed all pertinent imaging results/findings within the past 24 hours.      Assessment/Plan:     * ICD (implantable cardioverter-defibrillator) malfunction  Pts [employer] AICD is falling out of L chest wall.  Unclear reason for dehiscence of ICD site  BCx's pending. Continue Zosyn and Vanc  Will consult both ID and EP. ICD extraction scheduled tentatively for next week.     Seizure-like activity  Continue keppra    LVAD (left ventricular assist device) present  Procedure: Device Interrogation Including analysis of device parameters  Current Settings: Ventricular Assist Device  INR therapeutic continue coumadin.   Review of device function is stable/unstable stable        9/11/2023     5:00 PM 9/11/2023    12:00 PM 9/11/2023     8:00 AM 9/11/2023     4:55 AM 9/10/2023    11:44 PM 9/10/2023     7:31 PM 9/10/2023     4:18 PM   TXP LVAD INTERROGATIONS   Type HeartMate3 HeartMate3 HeartMate3 HeartMate3 HeartMate3 HeartMate3 HeartMate3   Flow 4.4 4.3 4.3 4.1 4.3 4.4 4.2   Speed 5100 5100 5100 5100 5100 5100 5100   PI 4.1 4.9 4.8 5.3 4.4 4.7 5   Power (Serna) 3.7 3.8 3.7 3.7 3.6 3.6 3.6   LSL   4700               Jeff Spencer PA-C  Heart Transplant  [facility] - Emergency Dept

## 2023-11-08 NOTE — ED PROVIDER NOTES
Encounter Date: 11/8/2023       History     Chief Complaint   Patient presents with    Wound Check     Possible infection at PICC line site and defibrillator site. LVAD pt     HPI  Kevan Queen is a 38 y.o. male on LVAD presenting with c/o defibrillator coming out of his chest. He stated that this started a few months ago. He denied chest trauma, fever, nausea, vomiting or diarrhea. He also c/o that his picc line looks longer that the previous ones he had. The picc line functions wthout irritation around it. He uses it daily for his milrinone infusion.      Review of patient's allergies indicates:   Allergen Reactions    Bumex [bumetanide] Hives    Torsemide Hives     Past Medical History:   Diagnosis Date    Acute respiratory failure with hypoxia 7/22/2023    Arthritis     Awareness alteration, transient 9/1/2022    Cardiomyopathy     CHF (congestive heart failure) 10/01/2020    COVID-19 6/3/2023    Diabetes mellitus     Dilated cardiomyopathy 10/26/2020    Drug abuse 10/2020    Headache 4/19/2023    Hyperglycemia 12/16/2022    Hyperosmolar hyperglycemic state (HHS) 5/25/2022    ICD (implantable cardioverter-defibrillator) in place 10/26/2020    Left ventricular assist device (LVAD) complication, initial encounter 6/5/2023    Muscle cramping 6/15/2022    Renal disorder     SOB (shortness of breath) 6/13/2022    Tingling in extremities 7/13/2022     Past Surgical History:   Procedure Laterality Date    APPLICATION OF WOUND VACUUM-ASSISTED CLOSURE DEVICE N/A 6/30/2022    Procedure: APPLICATION, WOUND VAC;  Surgeon: Luis F Paige MD;  Location: [facility] OR [address] [place] [address];  Service: Cardiovascular;  Laterality: N/A;  50 x 5 cm    CARDIAC DEFIBRILLATOR PLACEMENT      IMPLANTATION OF RIGHT VENTRICULAR ASSIST DEVICE (RVAD) N/A 6/29/2022    Procedure: INSERTION, RVAD;  Surgeon: Luis F Paige MD;  Location: [facility] OR [address] [place] [address];  Service: Cardiovascular;  Laterality: N/A;    INSERTION OF GRAFT TO PERICARDIUM Right 6/30/2022     Procedure: INSERTION-RIGHT VENTRICULAR ASSIST DEVICE;  Surgeon: Luis F Pagie MD;  [facility];  Service: Cardiovascular;  Laterality: Right;    IRRIGATION OF MEDIASTINUM  6/30/2022    Procedure: IRRIGATION, MEDIASTINUM;  Surgeon: Luis F Paige MD;  [facility];  Service: Cardiovascular;;    LEFT VENTRICULAR ASSIST DEVICE Left 6/23/2022    Procedure: INSERTION-LEFT VENTRICULAR ASSIST DEVICE;  Surgeon: Luis F Paige MD;  [facility];  Service: Cardiovascular;  Laterality: Left;    LEFT VENTRICULAR ASSIST DEVICE N/A 6/29/2022    Procedure: INSERTION-LEFT VENTRICULAR ASSIST DEVICE;  Surgeon: Luis F Paige MD;  [facility];  Service: Cardiovascular;  Laterality: N/A;    RIGHT HEART CATHETERIZATION Right 4/8/2022    Procedure: INSERTION, CATHETER, RIGHT HEART;  Surgeon: Luca Lopez Jr., MD;  Location: [facility] CATH LAB;  Service: Cardiology;  Laterality: Right;    RIGHT HEART CATHETERIZATION Right 4/19/2022    Procedure: INSERTION, CATHETER, RIGHT HEART;  Surgeon: Josh Pulido MD;  Location: [facility] CATH LAB;  Service: Cardiology;  Laterality: Right;    RIGHT HEART CATHETERIZATION Right 7/21/2022    Procedure: INSERTION, CATHETER, RIGHT HEART;  Surgeon: Dalia Crum MD;  Location: [facility] CATH LAB;  Service: Cardiology;  Laterality: Right;    RIGHT HEART CATHETERIZATION Right 10/31/2022    Procedure: INSERTION, CATHETER, RIGHT HEART;  Surgeon: Dalia Crum MD;  Location: [facility] CATH LAB;  Service: Cardiology;  Laterality: Right;    STERNAL WOUND CLOSURE N/A 6/30/2022    Procedure: CLOSURE, WOUND, STERNUM;  Surgeon: Luis F Paige MD;  Location: [address];  Service: Cardiovascular;  Laterality: N/A;     Family History   Problem Relation Age of Onset    Heart failure Father     Diverticulosis Brother     Heart attack Maternal Grandmother     Heart attack Maternal Grandfather      Social History     Tobacco Use    Smoking status: Former      Current packs/day: 0.00     Average packs/day: 0.5 packs/day for 16.6 years (8.3 ttl pk-yrs)     Types: Cigarettes     Start date: 10/1/2004     Quit date: 2021     Years since quittin.5    Smokeless tobacco: Never   Substance Use Topics    Alcohol use: Not Currently    Drug use: Not Currently     Types: Marijuana, MDMA (Ecstacy)     Review of Systems  See HPI  Physical Exam     Initial Vitals [23 1252]   BP Pulse Resp Temp SpO2   -- (!) 115 18 98.1 °F (36.7 °C) 99 %      MAP       --         Physical Exam    Constitutional: He appears well-developed and well-nourished.   HENT:   Head: Normocephalic and atraumatic.   Eyes: EOM are normal. Pupils are equal, round, and reactive to light.   Neck: Neck supple.   Normal range of motion.  Cardiovascular:            Tachycardic.  Defibrillator tapped to chest   Musculoskeletal:         General: No tenderness or edema. Normal range of motion.      Cervical back: Normal range of motion and neck supple.     Neurological: He is alert and oriented to person, place, and time.   Skin: Skin is warm and dry.   Psychiatric: He has a normal mood and affect.         ED Course   Procedures  Labs Reviewed   CULTURE, BLOOD   CULTURE, BLOOD   HIV 1 / 2 ANTIBODY   HEPATITIS C ANTIBODY   CBC W/ AUTO DIFFERENTIAL   COMPREHENSIVE METABOLIC PANEL   URINALYSIS, REFLEX TO URINE CULTURE          Imaging Results    None          Medications   piperacillin-tazobactam (ZOSYN) 4.5 g in dextrose 5 % in water (D5W) 100 mL IVPB (MB+) (has no administration in time range)   vancomycin 2 g in dextrose 5 % 500 mL IVPB (has no administration in time range)     Medical Decision Making  Differentials: Sepsis, Defibrillator rejection.     Kevan Queen is a 38 y.o. male on LVAD presenting with c/o defibrillator coming out of his chest. He was started on sepsis workup and broad spectrum abx. Cardiology was consulted for evaluation and management of the defibrillator.    Amount and/or Complexity  of Data Reviewed  Labs: ordered.  Radiology: ordered.    Risk  Prescription drug management.  Decision regarding hospitalization.               ED Course as of 11/08/23 1530   Wed Nov 08, 2023   1519 EKG:  Rate 108   Significant motion artifact limits interpretation  No obvious STEMI [DR]      ED Course User Index  [DR] Griffin Horta MD                    Clinical Impression:   Final diagnoses:  [B99.9] Infection               William Gonzales MD  Resident  11/08/23 1531       William Gonzales MD  Resident  11/08/23 8137

## 2023-11-08 NOTE — HPI
Consult: ICD Extraction from HF team. Primary admission for ICD Extraction.   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 ICD was placed in 10/2020. Patient reports he noticed it starting to come out of the pocket 1 week ago. No recent trauma or inciting factors that patient can recall. Started to notice increasing purulence and presented to the hospital. NO other infectious signs at this time. 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 On warfarin outpatient. Cbc, blood cultures, INR pending.

## 2023-11-08 NOTE — HPI
Kevan Queen is a 38 y.o. male on LVAD presenting with c/o defibrillator coming out of his chest. He stated that this started a few months ago. He also c/o that his picc line looks longer that the previous ones he has had. The picc line is functioning and there is no irritation around it. He uses it daily for his milrinone infusion. He states that his defibrillator has been coming out of his chest for around a week now. He denied chest trauma, fever, nausea, vomiting or diarrhea. Breathing is at baseline and comfortable at rest. Denies orthopnea or PND or LVAD alarms.

## 2023-11-08 NOTE — SUBJECTIVE & OBJECTIVE
Past Medical History:   Diagnosis Date    Acute respiratory failure with hypoxia 7/22/2023    Arthritis     Awareness alteration, transient 9/1/2022    Cardiomyopathy     CHF (congestive heart failure) 10/01/2020    COVID-19 6/3/2023    Diabetes mellitus     Dilated cardiomyopathy 10/26/2020    Drug abuse 10/2020    Headache 4/19/2023    Hyperglycemia 12/16/2022    Hyperosmolar hyperglycemic state (HHS) 5/25/2022    ICD (implantable cardioverter-defibrillator) in place 10/26/2020    Left ventricular assist device (LVAD) complication, initial encounter 6/5/2023    Muscle cramping 6/15/2022    Renal disorder     SOB (shortness of breath) 6/13/2022    Tingling in extremities 7/13/2022       Past Surgical History:   Procedure Laterality Date    APPLICATION OF WOUND VACUUM-ASSISTED CLOSURE DEVICE N/A 6/30/2022    Procedure: APPLICATION, WOUND VAC;  Surgeon: Luis F Paige MD;  Location: [facility] OR [address] [place] [address];  Service: Cardiovascular;  Laterality: N/A;  50 x 5 cm    CARDIAC DEFIBRILLATOR PLACEMENT      IMPLANTATION OF RIGHT VENTRICULAR ASSIST DEVICE (RVAD) N/A 6/29/2022    Procedure: INSERTION, RVAD;  Surgeon: Luis F Paige MD;  [facility];  Service: Cardiovascular;  Laterality: N/A;    INSERTION OF GRAFT TO PERICARDIUM Right 6/30/2022    Procedure: INSERTION-RIGHT VENTRICULAR ASSIST DEVICE;  Surgeon: Luis F Paige MD;  Location: [facility] OR 2ND FLR;  Service: Cardiovascular;  Laterality: Right;    IRRIGATION OF MEDIASTINUM  6/30/2022    Procedure: IRRIGATION, MEDIASTINUM;  Surgeon: Luis F Paige MD;  [facility];  Service: Cardiovascular;;    LEFT VENTRICULAR ASSIST DEVICE Left 6/23/2022    Procedure: INSERTION-LEFT VENTRICULAR ASSIST DEVICE;  Surgeon: Luis F Paige MD;  Location: [facility] OR [facility] FLR;  Service: Cardiovascular;  Laterality: Left;    LEFT VENTRICULAR ASSIST DEVICE N/A 6/29/2022    Procedure: INSERTION-LEFT VENTRICULAR ASSIST DEVICE;  Surgeon: Luis F Paige MD;  Location: [facility] OR [facility]  FLR;  Service: Cardiovascular;  Laterality: N/A;    RIGHT HEART CATHETERIZATION Right 4/8/2022    Procedure: INSERTION, CATHETER, RIGHT HEART;  Surgeon: Luca Lopez Jr., MD;  Location: [facility] CATH LAB;  Service: Cardiology;  Laterality: Right;    RIGHT HEART CATHETERIZATION Right 4/19/2022    Procedure: INSERTION, CATHETER, RIGHT HEART;  Surgeon: Josh Pulido MD;  Location: [facility] CATH LAB;  Service: Cardiology;  Laterality: Right;    RIGHT HEART CATHETERIZATION Right 7/21/2022    Procedure: INSERTION, CATHETER, RIGHT HEART;  Surgeon: Dalia Crum MD;  Location: [facility] CATH LAB;  Service: Cardiology;  Laterality: Right;    RIGHT HEART CATHETERIZATION Right 10/31/2022    Procedure: INSERTION, CATHETER, RIGHT HEART;  Surgeon: Dalia Crum MD;  Location: [facility] CATH LAB;  Service: Cardiology;  Laterality: Right;    STERNAL WOUND CLOSURE N/A 6/30/2022    Procedure: CLOSURE, WOUND, STERNUM;  Surgeon: Luis F Paige MD;  Location: [facility] OR [facility] FLR;  Service: Cardiovascular;  Laterality: N/A;       Review of patient's allergies indicates:   Allergen Reactions    Bumex [bumetanide] Hives    Torsemide Hives       No current facility-administered medications on file prior to encounter.     Current Outpatient Medications on File Prior to Encounter   Medication Sig    acetaminophen (TYLENOL) 325 MG tablet Take 2 tablets (650 mg total) by mouth every 6 (six) hours as needed for Pain.    aspirin (ECOTRIN) 81 MG EC tablet Take 1 tablet (81 mg total) by mouth once daily.    atorvastatin (LIPITOR) 40 MG tablet Take 1 tablet (40 mg total) by mouth once daily.    blood sugar diagnostic Strp Use to test blood glucose 4 (four) times daily.    blood-glucose meter Misc Use as instructed    busPIRone (BUSPAR) 10 MG tablet Take 10 mg by mouth 2 (two) times daily.    cefadroxil (DURICEF) 500 MG Cap Take 1 capsule (500 mg total) by mouth every 12 (twelve) hours.    enoxaparin (LOVENOX) 40 mg/0.4 mL Syrg Inject 0.4 mLs (40 mg  "total) into the skin every 12 (twelve) hours.    furosemide (LASIX) 80 MG tablet Take 1 tablet (80 mg total) by mouth once daily.    insulin aspart U-100 (NOVOLOG) 100 unit/mL (3 mL) InPn pen Inject 6 Units into the skin 3 (three) times daily with meals. Plus Sliding Scale: 151-200: +1, 201-250: +2, 251-300: +3, 301-350: +4 and call MD    insulin detemir U-100, Levemir, 100 unit/mL (3 mL) SubQ InPn pen Inject 8 Units into the skin 2 (two) times daily.    lancets 33 gauge Misc Use to test blood glucose 4 (four) times daily.    levETIRAcetam (KEPPRA) 1000 MG tablet TAKE 1 TABLET(1000 MG) BY MOUTH TWICE DAILY    magnesium oxide (MAG-OX) 400 mg (241.3 mg magnesium) tablet Take 1 tablet (400 mg total) by mouth once daily.    milrinone 20mg/100ml D5W, 200mcg/ml, (PRIMACOR) 20 mg/100 mL (200 mcg/mL) infusion Inject 23.6 mcg/min into the vein continuous.    mirtazapine (REMERON) 15 MG tablet Take 1 tablet (15 mg total) by mouth nightly.    pantoprazole (PROTONIX) 40 MG tablet Take 1 tablet (40 mg total) by mouth once daily.    pen needle, diabetic 32 gauge x 5/32" Ndle Use to inject insulin 5 (five) times daily.    polyethylene glycol (GLYCOLAX) 17 gram PwPk Take 17 g by mouth once daily.    senna (SENOKOT) 8.6 mg tablet Take 1 tablet by mouth once daily.    spironolactone (ALDACTONE) 25 MG tablet Take 1 tablet (25 mg total) by mouth once daily.    valsartan (DIOVAN) 40 MG tablet Take 1 tablet (40 mg total) by mouth 2 (two) times daily.    warfarin (COUMADIN) 3 MG tablet Take 1.5-2 tablets (4.5-6 mg total) by mouth Daily. Take 1.5 tablets (4.5mg) orally daily, except 2 tablets (6mg) on Wednesdays and Saturdays    [DISCONTINUED] digoxin (LANOXIN) 125 mcg tablet Take 1 tablet (0.125 mg total) by mouth once daily.    [DISCONTINUED] EScitalopram oxalate (LEXAPRO) 5 MG Tab Take 1 tablet (5 mg total) by mouth once daily.    [DISCONTINUED] insulin (LANTUS SOLOSTAR U-100 INSULIN) glargine 100 units/mL (3mL) SubQ pen Inject 10 Units " into the skin every evening. (Patient not taking: Reported on 2022)    [DISCONTINUED] metOLazone (ZAROXOLYN) 2.5 MG tablet Take 2.5 mg by mouth every other day.    [DISCONTINUED] metoprolol succinate (TOPROL-XL) 25 MG 24 hr tablet TAKE 1 TABLET(25 MG) BY MOUTH EVERY DAY     Family History       Problem Relation (Age of Onset)    Diverticulosis Brother    Heart attack Maternal Grandmother, Maternal Grandfather    Heart failure Father          Tobacco Use    Smoking status: Former     Current packs/day: 0.00     Average packs/day: 0.5 packs/day for 16.6 years (8.3 ttl pk-yrs)     Types: Cigarettes     Start date: 10/1/2004     Quit date: 2021     Years since quittin.5    Smokeless tobacco: Never   Substance and Sexual Activity    Alcohol use: Not Currently    Drug use: Not Currently     Types: Marijuana, MDMA (Ecstacy)    Sexual activity: Yes     Partners: Female     Birth control/protection: None     Review of Systems   Constitutional: Negative for chills, fever, malaise/fatigue and night sweats.   HENT:  Negative for congestion, nosebleeds, sore throat, stridor and tinnitus.    Eyes:  Negative for blurred vision, discharge, double vision, pain, vision loss in left eye, vision loss in right eye, visual disturbance and visual halos.   Cardiovascular:  Negative for chest pain, dyspnea on exertion, leg swelling, near-syncope, orthopnea, palpitations and syncope.        Tenderness over ICD site   Respiratory:  Negative for cough, hemoptysis, shortness of breath, snoring, sputum production and wheezing.    Endocrine: Negative for cold intolerance, heat intolerance and polydipsia.   Hematologic/Lymphatic: Negative for adenopathy and bleeding problem. Does not bruise/bleed easily.   Skin:  Negative for color change, dry skin, flushing, poor wound healing and suspicious lesions.   Musculoskeletal:  Negative for arthritis, back pain, gout, joint pain and joint swelling.   Gastrointestinal:  Negative for  bloating, abdominal pain, constipation and diarrhea.   Genitourinary:  Negative for dysuria, frequency and hematuria.   Neurological:  Negative for dizziness, focal weakness, headaches, light-headedness, loss of balance, numbness and weakness.   Psychiatric/Behavioral:  Negative for altered mental status, hallucinations and hypervigilance. The patient is not nervous/anxious.      Objective:     Vital Signs (Most Recent):  Temp: 98.1 °F (36.7 °C) (11/08/23 1252)  Pulse: (!) 115 (11/08/23 1252)  Resp: 18 (11/08/23 1252)  SpO2: 99 % (11/08/23 1252) Vital Signs (24h Range):  Temp:  [98.1 °F (36.7 °C)] 98.1 °F (36.7 °C)  Pulse:  [115] 115  Resp:  [18] 18  SpO2:  [99 %] 99 %       Weight: 94.3 kg (208 lb)  Body mass index is 26 kg/m².    SpO2: 99 %        Physical Exam  Constitutional:       General: He is not in acute distress.     Appearance: Normal appearance. He is normal weight. He is not toxic-appearing.   HENT:      Head: Normocephalic and atraumatic.   Eyes:      General:         Right eye: No discharge.         Left eye: No discharge.      Extraocular Movements: Extraocular movements intact.      Conjunctiva/sclera: Conjunctivae normal.      Pupils: Pupils are equal, round, and reactive to light.   Cardiovascular:      Rate and Rhythm: Normal rate and regular rhythm.      Pulses: Normal pulses.      Heart sounds: Normal heart sounds. No murmur heard.     No friction rub.      Comments: Half of ICD protruding from L upper chest wall.   Pulmonary:      Effort: Pulmonary effort is normal. No respiratory distress.      Breath sounds: Normal breath sounds. No wheezing or rales.   Abdominal:      General: Abdomen is flat. Bowel sounds are normal. There is no distension.      Palpations: Abdomen is soft.      Tenderness: There is no abdominal tenderness. There is no guarding.   Musculoskeletal:         General: No swelling, tenderness or deformity. Normal range of motion.      Cervical back: Normal range of motion and  neck supple. No rigidity.      Right lower leg: No edema.      Left lower leg: No edema.   Skin:     General: Skin is warm and dry.      Capillary Refill: Capillary refill takes less than 2 seconds.      Coloration: Skin is not jaundiced or pale.      Findings: No bruising.   Neurological:      General: No focal deficit present.      Mental Status: He is alert and oriented to person, place, and time. Mental status is at baseline.   Psychiatric:         Mood and Affect: Mood normal.         Thought Content: Thought content normal.         Judgment: Judgment normal.

## 2023-11-08 NOTE — Clinical Note
Anes starting induction process and starting arterial line Patient Education        Chronic Kidney Disease: Care Instructions  Overview     Chronic kidney disease happens when your kidneys don't work as well as they should. Your kidneys have a few important jobs. They remove waste from your blood. This waste leaves your body in your urine. They also balance your body's fluids and chemicals. When your kidneys don't work well, extra waste and fluid can build up. This can poison the body and sometimes cause death. The most common causes of this disease are diabetes and high blood pressure. In some cases, the disease develops in 2 to 3 months. But it usually develops over many years. If you take medicine and make healthy changes to your lifestyle, you may be able to prevent the disease from getting worse. But if your kidney damage gets worse, you may need dialysis or a kidney transplant. Dialysis uses a machine to filter waste from the blood. A transplant is surgery to give you a healthy kidney from another person. Follow-up care is a key part of your treatment and safety. Be sure to make and go to all appointments, and call your doctor if you are having problems. It's also a good idea to know your test results and keep a list of the medicines you take. How can you care for yourself at home? Treatments and appointments    · Be safe with medicines. Take your medicines exactly as prescribed. Call your doctor if you have any problems with your medicine. You also may take medicine to control your blood pressure or to treat diabetes. Many people who have diabetes take blood pressure medicine.     · If you have diabetes, do your best to keep your blood sugar in your target range. You may do this by eating healthy food and exercising. You may also take medicines.     · Go to your dialysis appointments if you have this treatment.     · Do not take ibuprofen, naproxen, or similar medicines, unless your doctor tells you to.  These may make the disease worse.     · Do not take any vitamins, over-the-counter medicines, or herbal products without talking to your doctor first.     · Do not smoke or use other tobacco products. Smoking can reduce blood flow to the kidneys. If you need help quitting, talk to your doctor about stop-smoking programs and medicines. These can increase your chances of quitting for good.     · Limit your use of alcohol and avoid illegal drugs.     · Talk to your doctor about an exercise plan. Exercise helps lower your blood pressure. It also makes you feel better.     · If you have an advance directive, let your doctor know. It may include a living will and a durable power of  for health care. If you don't have one, you may want to prepare one. It lets your doctor and loved ones know your health care wishes if you become unable to speak for yourself. Diet    · Talk to a registered dietitian. They can help you make a meal plan that is right for you. Most people with kidney disease need to limit salt (sodium), fluids, and protein. Some also have to limit potassium and phosphorus.     · You may have to give up many foods you like. But try to focus on the fact that this will help you stay healthy for as long as possible.     · If you have a hard time eating enough, talk to your doctor or dietitian about ways to add calories to your diet.     · Your diet may change as your disease changes. See your doctor for regular testing. And work with a dietitian to change your diet as needed. When should you call for help? Call 911 anytime you think you may need emergency care. For example, call if:    · You passed out (lost consciousness). Call your doctor now or seek immediate medical care if:    · You have less urine than normal or no urine.     · You have trouble urinating or can urinate only very small amounts.     · You are confused or have trouble thinking clearly.     · You feel weaker or more tired than usual.     · You are very thirsty, lightheaded, or dizzy.   · You have nausea and vomiting.     · You have new swelling of your arms or feet, or your swelling is worse.     · You have blood in your urine.     · You have new or worse trouble breathing. Watch closely for changes in your health, and be sure to contact your doctor if:    · You have any problems with your medicine or other treatment. Where can you learn more? Go to http://www.gray.com/  Enter N276 in the search box to learn more about \"Chronic Kidney Disease: Care Instructions. \"  Current as of: December 17, 2020               Content Version: 13.0  © 3904-8612 [employer]. Care instructions adapted under license by [employer] (which disclaims liability or warranty for this information). If you have questions about a medical condition or this instruction, always ask your healthcare professional. [address] any warranty or liability for your use of this information.

## 2023-11-08 NOTE — SUBJECTIVE & OBJECTIVE
Past Medical History:   Diagnosis Date    Acute respiratory failure with hypoxia 7/22/2023    Arthritis     Awareness alteration, transient 9/1/2022    Cardiomyopathy     CHF (congestive heart failure) 10/01/2020    COVID-19 6/3/2023    Diabetes mellitus     Dilated cardiomyopathy 10/26/2020    Drug abuse 10/2020    Headache 4/19/2023    Hyperglycemia 12/16/2022    Hyperosmolar hyperglycemic state (HHS) 5/25/2022    ICD (implantable cardioverter-defibrillator) in place 10/26/2020    Left ventricular assist device (LVAD) complication, initial encounter 6/5/2023    Muscle cramping 6/15/2022    Renal disorder     SOB (shortness of breath) 6/13/2022    Tingling in extremities 7/13/2022       Past Surgical History:   Procedure Laterality Date    APPLICATION OF WOUND VACUUM-ASSISTED CLOSURE DEVICE N/A 6/30/2022    Procedure: APPLICATION, WOUND VAC;  Surgeon: Luis F Paige MD;  Location: [facility] OR [address] [place] [address];  Service: Cardiovascular;  Laterality: N/A;  50 x 5 cm    CARDIAC DEFIBRILLATOR PLACEMENT      IMPLANTATION OF RIGHT VENTRICULAR ASSIST DEVICE (RVAD) N/A 6/29/2022    Procedure: INSERTION, RVAD;  Surgeon: Luis F Paige MD;  [facility];  Service: Cardiovascular;  Laterality: N/A;    INSERTION OF GRAFT TO PERICARDIUM Right 6/30/2022    Procedure: INSERTION-RIGHT VENTRICULAR ASSIST DEVICE;  Surgeon: Luis F Paige MD;  Location: [facility] OR 2ND FLR;  Service: Cardiovascular;  Laterality: Right;    IRRIGATION OF MEDIASTINUM  6/30/2022    Procedure: IRRIGATION, MEDIASTINUM;  Surgeon: Luis F Paige MD;  [facility];  Service: Cardiovascular;;    LEFT VENTRICULAR ASSIST DEVICE Left 6/23/2022    Procedure: INSERTION-LEFT VENTRICULAR ASSIST DEVICE;  Surgeon: Luis F Paige MD;  Location: [facility] OR [facility] FLR;  Service: Cardiovascular;  Laterality: Left;    LEFT VENTRICULAR ASSIST DEVICE N/A 6/29/2022    Procedure: INSERTION-LEFT VENTRICULAR ASSIST DEVICE;  Surgeon: Luis F Paige MD;  Location: [facility] OR [facility]  FLR;  Service: Cardiovascular;  Laterality: N/A;    RIGHT HEART CATHETERIZATION Right 4/8/2022    Procedure: INSERTION, CATHETER, RIGHT HEART;  Surgeon: Luca Lopez Jr., MD;  Location: [facility] CATH LAB;  Service: Cardiology;  Laterality: Right;    RIGHT HEART CATHETERIZATION Right 4/19/2022    Procedure: INSERTION, CATHETER, RIGHT HEART;  Surgeon: Josh Pulido MD;  Location: [facility] CATH LAB;  Service: Cardiology;  Laterality: Right;    RIGHT HEART CATHETERIZATION Right 7/21/2022    Procedure: INSERTION, CATHETER, RIGHT HEART;  Surgeon: Dalia Crum MD;  Location: [facility] CATH LAB;  Service: Cardiology;  Laterality: Right;    RIGHT HEART CATHETERIZATION Right 10/31/2022    Procedure: INSERTION, CATHETER, RIGHT HEART;  Surgeon: Dalia Crum MD;  Location: [facility] CATH LAB;  Service: Cardiology;  Laterality: Right;    STERNAL WOUND CLOSURE N/A 6/30/2022    Procedure: CLOSURE, WOUND, STERNUM;  Surgeon: Luis F Paige MD;  Location: [facility] OR [facility] FLR;  Service: Cardiovascular;  Laterality: N/A;       Review of patient's allergies indicates:   Allergen Reactions    Bumex [bumetanide] Hives    Torsemide Hives       Current Facility-Administered Medications   Medication    piperacillin-tazobactam (ZOSYN) 4.5 g in dextrose 5 % in water (D5W) 100 mL IVPB (MB+)    vancomycin 2 g in dextrose 5 % 500 mL IVPB     Current Outpatient Medications   Medication Sig    acetaminophen (TYLENOL) 325 MG tablet Take 2 tablets (650 mg total) by mouth every 6 (six) hours as needed for Pain.    aspirin (ECOTRIN) 81 MG EC tablet Take 1 tablet (81 mg total) by mouth once daily.    atorvastatin (LIPITOR) 40 MG tablet Take 1 tablet (40 mg total) by mouth once daily.    blood sugar diagnostic Strp Use to test blood glucose 4 (four) times daily.    blood-glucose meter Misc Use as instructed    busPIRone (BUSPAR) 10 MG tablet Take 10 mg by mouth 2 (two) times daily.    cefadroxil (DURICEF) 500 MG Cap Take 1 capsule (500 mg total) by mouth  "every 12 (twelve) hours.    enoxaparin (LOVENOX) 40 mg/0.4 mL Syrg Inject 0.4 mLs (40 mg total) into the skin every 12 (twelve) hours.    furosemide (LASIX) 80 MG tablet Take 1 tablet (80 mg total) by mouth once daily.    insulin aspart U-100 (NOVOLOG) 100 unit/mL (3 mL) InPn pen Inject 6 Units into the skin 3 (three) times daily with meals. Plus Sliding Scale: 151-200: +1, 201-250: +2, 251-300: +3, 301-350: +4 and call MD    insulin detemir U-100, Levemir, 100 unit/mL (3 mL) SubQ InPn pen Inject 8 Units into the skin 2 (two) times daily.    lancets 33 gauge Misc Use to test blood glucose 4 (four) times daily.    levETIRAcetam (KEPPRA) 1000 MG tablet TAKE 1 TABLET(1000 MG) BY MOUTH TWICE DAILY    magnesium oxide (MAG-OX) 400 mg (241.3 mg magnesium) tablet Take 1 tablet (400 mg total) by mouth once daily.    milrinone 20mg/100ml D5W, 200mcg/ml, (PRIMACOR) 20 mg/100 mL (200 mcg/mL) infusion Inject 23.6 mcg/min into the vein continuous.    mirtazapine (REMERON) 15 MG tablet Take 1 tablet (15 mg total) by mouth nightly.    pantoprazole (PROTONIX) 40 MG tablet Take 1 tablet (40 mg total) by mouth once daily.    pen needle, diabetic 32 gauge x 5/32" Ndle Use to inject insulin 5 (five) times daily.    polyethylene glycol (GLYCOLAX) 17 gram PwPk Take 17 g by mouth once daily.    senna (SENOKOT) 8.6 mg tablet Take 1 tablet by mouth once daily.    spironolactone (ALDACTONE) 25 MG tablet Take 1 tablet (25 mg total) by mouth once daily.    valsartan (DIOVAN) 40 MG tablet Take 1 tablet (40 mg total) by mouth 2 (two) times daily.    warfarin (COUMADIN) 3 MG tablet Take 1.5-2 tablets (4.5-6 mg total) by mouth Daily. Take 1.5 tablets (4.5mg) orally daily, except 2 tablets (6mg) on Wednesdays and Saturdays     Family History       Problem Relation (Age of Onset)    Diverticulosis Brother    Heart attack Maternal Grandmother, Maternal Grandfather    Heart failure Father          Tobacco Use    Smoking status: Former     Current " packs/day: 0.00     Average packs/day: 0.5 packs/day for 16.6 years (8.3 ttl pk-yrs)     Types: Cigarettes     Start date: 10/1/2004     Quit date: 2021     Years since quittin.5    Smokeless tobacco: Never   Substance and Sexual Activity    Alcohol use: Not Currently    Drug use: Not Currently     Types: Marijuana, MDMA (Ecstacy)    Sexual activity: Yes     Partners: Female     Birth control/protection: None     Review of Systems   Respiratory:  Negative for chest tightness and shortness of breath.    Cardiovascular:  Negative for chest pain, palpitations and leg swelling.   Gastrointestinal:  Negative for abdominal distention and abdominal pain.     Objective:     Vital Signs (Most Recent):  Temp: 98.1 °F (36.7 °C) (23 1252)  Pulse: (!) 115 (23 1252)  Resp: 18 (23 1252)  SpO2: 99 % (23 1252) Vital Signs (24h Range):  Temp:  [98.1 °F (36.7 °C)] 98.1 °F (36.7 °C)  Pulse:  [115] 115  Resp:  [18] 18  SpO2:  [99 %] 99 %     Patient Vitals for the past 72 hrs (Last 3 readings):   Weight   23 1252 94.3 kg (208 lb)     Body mass index is 26 kg/m².    No intake or output data in the 24 hours ending 23 1613       Physical Exam  Constitutional:       Appearance: Normal appearance.   HENT:      Head: Normocephalic and atraumatic.   Neck:      Vascular: No JVD.   Cardiovascular:      Rate and Rhythm: Normal rate and regular rhythm.      Pulses: Normal pulses.      Heart sounds: Normal heart sounds.      Comments: Smooth lvad hum.   Pulmonary:      Effort: Pulmonary effort is normal.      Breath sounds: Normal breath sounds.   Chest:      Comments: [employer] defibrillator is sticking out of L chest wall.   Abdominal:      General: Bowel sounds are normal. There is no distension.      Palpations: Abdomen is soft.      Tenderness: There is no abdominal tenderness.   Musculoskeletal:         General: Normal range of motion.      Cervical back: Normal range of motion and neck  "supple.   Skin:     General: Skin is warm and dry.      Capillary Refill: Capillary refill takes less than 2 seconds.   Neurological:      General: No focal deficit present.      Mental Status: He is alert and oriented to person, place, and time.   Psychiatric:         Mood and Affect: Mood normal.         Behavior: Behavior normal.            Significant Labs:  CBC:  Recent Labs   Lab 11/08/23  1407   WBC 9.21   RBC 4.29*   HGB 9.8*   HCT 31.5*      MCV 73*   MCH 22.8*   MCHC 31.1*     BNP:  No results for input(s): "BNP" in the last 168 hours.    Invalid input(s): "BNPTRIAGELBLO"  CMP:  Recent Labs   Lab 11/08/23  1407   *   CALCIUM 9.3   ALBUMIN 3.4*   PROT 8.2      K 3.3*   CO2 27   CL 96   BUN 18   CREATININE 1.5*   ALKPHOS 127   ALT 11   AST 30   BILITOT 1.7*      Coagulation:   Recent Labs   Lab 11/08/23  1420   INR 2.9*     LDH:  No results for input(s): "LDH" in the last 72 hours.  Microbiology:  Microbiology Results (last 7 days)       Procedure Component Value Units Date/Time    Blood culture x two cultures. Draw prior to antibiotics. [[id number]] Collected: 11/08/23 1408    Order Status: Sent Specimen: Blood from Peripheral, Antecubital, Left Updated: 11/08/23 1453    Blood culture x two cultures. Draw prior to antibiotics. [[id number]] Collected: 11/08/23 1407    Order Status: Sent Specimen: Blood from Peripheral, Lower Arm, Left Updated: 11/08/23 1453            I have reviewed all pertinent labs within the past 24 hours.    Diagnostic Results:  I have reviewed all pertinent imaging results/findings within the past 24 hours.    "

## 2023-11-08 NOTE — Clinical Note
A generator pocket was revised at the left chest with blunt dissection, electrocautery and sharp dissection.

## 2023-11-08 NOTE — CONSULTS
[facility] - Emergency Dept  Cardiac Electrophysiology  Consult Note    Admission Date: 11/8/2023  Code Status: Full Code   Attending Provider: Griffin Horta MD  Consulting Provider: Won Rowland MD  Principal Problem:ICD (implantable cardioverter-defibrillator) malfunction    Inpatient consult to Electrophysiology  Consult performed by: Won Rowland MD  Consult ordered by: Jan Pruitt MD  Reason for consult: ICD infection        Subjective:     Chief Complaint:  ICD Extraction     HPI:   Consult: ICD Extraction from HF team. Primary admission for ICD Extraction.   EP Outpatient: Does not follow with [facility]     39 y/o AAM w/ PMH of NICM (possible Familial w/ father having LVAD and subsequent transplant) s/p DT-HM3 (6/3/2022), HFrEF 10% s/p ICD ([employer], placed 10/2020), chronic DLES on outpt abx, MSSA bacteremia c/b L empyema (maintained on ancef, maria isabel 9/14), seizures, and IDDM2. ICD was placed in 10/2020. Patient reports he noticed it starting to come out of the pocket 1 week ago. No recent trauma or inciting factors that patient can recall. Started to notice increasing purulence and presented to the hospital. NO other infectious signs at this time. 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 On warfarin outpatient. Cbc, blood cultures, INR pending.    Past Medical History:   Diagnosis Date    Acute respiratory failure with hypoxia 7/22/2023    Arthritis     Awareness alteration, transient 9/1/2022    Cardiomyopathy     CHF (congestive heart failure) 10/01/2020    COVID-19 6/3/2023    Diabetes mellitus     Dilated cardiomyopathy 10/26/2020    Drug abuse 10/2020    Headache 4/19/2023    Hyperglycemia 12/16/2022    Hyperosmolar hyperglycemic state (HHS) 5/25/2022    ICD (implantable cardioverter-defibrillator) in place 10/26/2020    Left ventricular assist device (LVAD) complication, initial encounter 6/5/2023    Muscle cramping 6/15/2022     Renal disorder     SOB (shortness of breath) 6/13/2022    Tingling in extremities 7/13/2022       Past Surgical History:   Procedure Laterality Date    APPLICATION OF WOUND VACUUM-ASSISTED CLOSURE DEVICE N/A 6/30/2022    Procedure: APPLICATION, WOUND VAC;  Surgeon: Luis F Paige MD;  [facility];  Service: Cardiovascular;  Laterality: N/A;  50 x 5 cm    CARDIAC DEFIBRILLATOR PLACEMENT      IMPLANTATION OF RIGHT VENTRICULAR ASSIST DEVICE (RVAD) N/A 6/29/2022    Procedure: INSERTION, RVAD;  Surgeon: Luis F Paige MD;  Location: [facility] OR 2ND FLR;  Service: Cardiovascular;  Laterality: N/A;    INSERTION OF GRAFT TO PERICARDIUM Right 6/30/2022    Procedure: INSERTION-RIGHT VENTRICULAR ASSIST DEVICE;  Surgeon: Luis F Paige MD;  Location: [facility] OR [facility] FLR;  Service: Cardiovascular;  Laterality: Right;    IRRIGATION OF MEDIASTINUM  6/30/2022    Procedure: IRRIGATION, MEDIASTINUM;  Surgeon: Luis F Paige MD;  [facility];  Service: Cardiovascular;;    LEFT VENTRICULAR ASSIST DEVICE Left 6/23/2022    Procedure: INSERTION-LEFT VENTRICULAR ASSIST DEVICE;  Surgeon: Luis F Paige MD;  Location: [facility] OR [facility] FLR;  Service: Cardiovascular;  Laterality: Left;    LEFT VENTRICULAR ASSIST DEVICE N/A 6/29/2022    Procedure: INSERTION-LEFT VENTRICULAR ASSIST DEVICE;  Surgeon: Luis F Paige MD;  Location: [facility] OR [facility] FLR;  Service: Cardiovascular;  Laterality: N/A;    RIGHT HEART CATHETERIZATION Right 4/8/2022    Procedure: INSERTION, CATHETER, RIGHT HEART;  Surgeon: Luca Lopez Jr., MD;  Location: [facility] CATH LAB;  Service: Cardiology;  Laterality: Right;    RIGHT HEART CATHETERIZATION Right 4/19/2022    Procedure: INSERTION, CATHETER, RIGHT HEART;  Surgeon: Josh Pulido MD;  Location: [facility] CATH LAB;  Service: Cardiology;  Laterality: Right;    RIGHT HEART CATHETERIZATION Right 7/21/2022    Procedure: INSERTION, CATHETER, RIGHT HEART;  Surgeon: Dalia Crum MD;  Location: [facility] CATH  LAB;  Service: Cardiology;  Laterality: Right;    RIGHT HEART CATHETERIZATION Right 10/31/2022    Procedure: INSERTION, CATHETER, RIGHT HEART;  Surgeon: Dalia Crum MD;  Location: [facility] CATH LAB;  Service: Cardiology;  Laterality: Right;    STERNAL WOUND CLOSURE N/A 6/30/2022    Procedure: CLOSURE, WOUND, STERNUM;  Surgeon: Luis F Paige MD;  [facility];  Service: Cardiovascular;  Laterality: N/A;       Review of patient's allergies indicates:   Allergen Reactions    Bumex [bumetanide] Hives    Torsemide Hives       No current facility-administered medications on file prior to encounter.     Current Outpatient Medications on File Prior to Encounter   Medication Sig    acetaminophen (TYLENOL) 325 MG tablet Take 2 tablets (650 mg total) by mouth every 6 (six) hours as needed for Pain.    aspirin (ECOTRIN) 81 MG EC tablet Take 1 tablet (81 mg total) by mouth once daily.    atorvastatin (LIPITOR) 40 MG tablet Take 1 tablet (40 mg total) by mouth once daily.    blood sugar diagnostic Strp Use to test blood glucose 4 (four) times daily.    blood-glucose meter Misc Use as instructed    busPIRone (BUSPAR) 10 MG tablet Take 10 mg by mouth 2 (two) times daily.    cefadroxil (DURICEF) 500 MG Cap Take 1 capsule (500 mg total) by mouth every 12 (twelve) hours.    enoxaparin (LOVENOX) 40 mg/0.4 mL Syrg Inject 0.4 mLs (40 mg total) into the skin every 12 (twelve) hours.    furosemide (LASIX) 80 MG tablet Take 1 tablet (80 mg total) by mouth once daily.    insulin aspart U-100 (NOVOLOG) 100 unit/mL (3 mL) InPn pen Inject 6 Units into the skin 3 (three) times daily with meals. Plus Sliding Scale: 151-200: +1, 201-250: +2, 251-300: +3, 301-350: +4 and call MD    insulin detemir U-100, Levemir, 100 unit/mL (3 mL) SubQ InPn pen Inject 8 Units into the skin 2 (two) times daily.    lancets 33 gauge Misc Use to test blood glucose 4 (four) times daily.    levETIRAcetam (KEPPRA) 1000 MG tablet TAKE 1 TABLET(1000 MG) BY  "MOUTH TWICE DAILY    magnesium oxide (MAG-OX) 400 mg (241.3 mg magnesium) tablet Take 1 tablet (400 mg total) by mouth once daily.    milrinone 20mg/100ml D5W, 200mcg/ml, (PRIMACOR) 20 mg/100 mL (200 mcg/mL) infusion Inject 23.6 mcg/min into the vein continuous.    mirtazapine (REMERON) 15 MG tablet Take 1 tablet (15 mg total) by mouth nightly.    pantoprazole (PROTONIX) 40 MG tablet Take 1 tablet (40 mg total) by mouth once daily.    pen needle, diabetic 32 gauge x 5/32" Ndle Use to inject insulin 5 (five) times daily.    polyethylene glycol (GLYCOLAX) 17 gram PwPk Take 17 g by mouth once daily.    senna (SENOKOT) 8.6 mg tablet Take 1 tablet by mouth once daily.    spironolactone (ALDACTONE) 25 MG tablet Take 1 tablet (25 mg total) by mouth once daily.    valsartan (DIOVAN) 40 MG tablet Take 1 tablet (40 mg total) by mouth 2 (two) times daily.    warfarin (COUMADIN) 3 MG tablet Take 1.5-2 tablets (4.5-6 mg total) by mouth Daily. Take 1.5 tablets (4.5mg) orally daily, except 2 tablets (6mg) on Wednesdays and Saturdays    [DISCONTINUED] digoxin (LANOXIN) 125 mcg tablet Take 1 tablet (0.125 mg total) by mouth once daily.    [DISCONTINUED] EScitalopram oxalate (LEXAPRO) 5 MG Tab Take 1 tablet (5 mg total) by mouth once daily.    [DISCONTINUED] insulin (LANTUS SOLOSTAR U-100 INSULIN) glargine 100 units/mL (3mL) SubQ pen Inject 10 Units into the skin every evening. (Patient not taking: Reported on 5/24/2022)    [DISCONTINUED] metOLazone (ZAROXOLYN) 2.5 MG tablet Take 2.5 mg by mouth every other day.    [DISCONTINUED] metoprolol succinate (TOPROL-XL) 25 MG 24 hr tablet TAKE 1 TABLET(25 MG) BY MOUTH EVERY DAY     Family History       Problem Relation (Age of Onset)    Diverticulosis Brother    Heart attack Maternal Grandmother, Maternal Grandfather    Heart failure Father          Tobacco Use    Smoking status: Former     Current packs/day: 0.00     Average packs/day: 0.5 packs/day for 16.6 years (8.3 ttl pk-yrs)     " Types: Cigarettes     Start date: 10/1/2004     Quit date: 2021     Years since quittin.5    Smokeless tobacco: Never   Substance and Sexual Activity    Alcohol use: Not Currently    Drug use: Not Currently     Types: Marijuana, MDMA (Ecstacy)    Sexual activity: Yes     Partners: Female     Birth control/protection: None     Review of Systems   Constitutional: Negative for chills, fever, malaise/fatigue and night sweats.   HENT:  Negative for congestion, nosebleeds, sore throat, stridor and tinnitus.    Eyes:  Negative for blurred vision, discharge, double vision, pain, vision loss in left eye, vision loss in right eye, visual disturbance and visual halos.   Cardiovascular:  Negative for chest pain, dyspnea on exertion, leg swelling, near-syncope, orthopnea, palpitations and syncope.        Tenderness over ICD site   Respiratory:  Negative for cough, hemoptysis, shortness of breath, snoring, sputum production and wheezing.    Endocrine: Negative for cold intolerance, heat intolerance and polydipsia.   Hematologic/Lymphatic: Negative for adenopathy and bleeding problem. Does not bruise/bleed easily.   Skin:  Negative for color change, dry skin, flushing, poor wound healing and suspicious lesions.   Musculoskeletal:  Negative for arthritis, back pain, gout, joint pain and joint swelling.   Gastrointestinal:  Negative for bloating, abdominal pain, constipation and diarrhea.   Genitourinary:  Negative for dysuria, frequency and hematuria.   Neurological:  Negative for dizziness, focal weakness, headaches, light-headedness, loss of balance, numbness and weakness.   Psychiatric/Behavioral:  Negative for altered mental status, hallucinations and hypervigilance. The patient is not nervous/anxious.      Objective:     Vital Signs (Most Recent):  Temp: 98.1 °F (36.7 °C) (23 125)  Pulse: (!) 115 (23 125)  Resp: 18 (23 125)  SpO2: 99 % (23) Vital Signs (24h Range):  Temp:  [98.1 °F  (36.7 °C)] 98.1 °F (36.7 °C)  Pulse:  [115] 115  Resp:  [18] 18  SpO2:  [99 %] 99 %       Weight: 94.3 kg (208 lb)  Body mass index is 26 kg/m².    SpO2: 99 %        Physical Exam  Constitutional:       General: He is not in acute distress.     Appearance: Normal appearance. He is normal weight. He is not toxic-appearing.   HENT:      Head: Normocephalic and atraumatic.   Eyes:      General:         Right eye: No discharge.         Left eye: No discharge.      Extraocular Movements: Extraocular movements intact.      Conjunctiva/sclera: Conjunctivae normal.      Pupils: Pupils are equal, round, and reactive to light.   Cardiovascular:      Rate and Rhythm: Normal rate and regular rhythm.      Pulses: Normal pulses.      Heart sounds: Normal heart sounds. No murmur heard.     No friction rub.      Comments: Half of ICD protruding from L upper chest wall.   Pulmonary:      Effort: Pulmonary effort is normal. No respiratory distress.      Breath sounds: Normal breath sounds. No wheezing or rales.   Abdominal:      General: Abdomen is flat. Bowel sounds are normal. There is no distension.      Palpations: Abdomen is soft.      Tenderness: There is no abdominal tenderness. There is no guarding.   Musculoskeletal:         General: No swelling, tenderness or deformity. Normal range of motion.      Cervical back: Normal range of motion and neck supple. No rigidity.      Right lower leg: No edema.      Left lower leg: No edema.   Skin:     General: Skin is warm and dry.      Capillary Refill: Capillary refill takes less than 2 seconds.      Coloration: Skin is not jaundiced or pale.      Findings: No bruising.   Neurological:      General: No focal deficit present.      Mental Status: He is alert and oriented to person, place, and time. Mental status is at baseline.   Psychiatric:         Mood and Affect: Mood normal.         Thought Content: Thought content normal.         Judgment: Judgment normal.                     Assessment and Plan:     ICD (implantable cardioverter-defibrillator) infection  ICD ([employer]) protruding from L chest wall. Placed in 10/2020 at [facility]. 38M with NICM EF 10% and HM III and chronic driveline infections. On warfarin and chronic cephalosporins.  -Consult ID. Broad spectrum antibiotics.  -Therapeutic on warfarin  -Device Interrogation. No VT shocks prior  -Will take patient for device/lead extraction + GUILLERMO tomorrow. NPO midnight. If new ICD for primary prevention is required, please let us know to schedule for another day. Will proceed with initial extraction and management of potential infection for now.           Thank you for your consult. I will follow-up with patient. Please contact us if you have any additional questions.    Won Rowland MD  Cardiac Electrophysiology  [facility] - Emergency Dept

## 2023-11-08 NOTE — ASSESSMENT & PLAN NOTE
ICD ([employer]) protruding from L chest wall. Placed in 10/2020 at [facility]. 38M with NICM EF 10% and HM III and chronic driveline infections. On warfarin and chronic cephalosporins.  -Consult ID. Broad spectrum antibiotics.  -Therapeutic on warfarin  -Device Interrogation. No VT shocks prior  -Will take patient for device/lead extraction + GUILLERMO tomorrow. NPO midnight. If new ICD for primary prevention is required, please let us know to schedule for another day. Will proceed with initial extraction and management of potential infection for now.

## 2023-11-08 NOTE — ED TRIAGE NOTES
Pt presents to ED c/o displacement of defibrillator that was placed 2 years ago, states it began coming out 1 week ago. Pt also presents with PICC line with milrinone infusion. Pt states it is his third PICC line and is concerned that it is coming out. Pt has LVAD. Pt denies symptoms at this time.

## 2023-11-08 NOTE — Clinical Note
The patient's elbows and knees were padded, heels floated, warming blanket given, and safety strap applied. nurses notes/vital signs

## 2023-11-08 NOTE — ASSESSMENT & PLAN NOTE
Procedure: Device Interrogation Including analysis of device parameters  Current Settings: Ventricular Assist Device  INR therapeutic continue coumadin.   Review of device function is stable/unstable stable        9/11/2023     5:00 PM 9/11/2023    12:00 PM 9/11/2023     8:00 AM 9/11/2023     4:55 AM 9/10/2023    11:44 PM 9/10/2023     7:31 PM 9/10/2023     4:18 PM   TXP LVAD INTERROGATIONS   Type HeartMate3 HeartMate3 HeartMate3 HeartMate3 HeartMate3 HeartMate3 HeartMate3   Flow 4.4 4.3 4.3 4.1 4.3 4.4 4.2   Speed 5100 5100 5100 5100 5100 5100 5100   PI 4.1 4.9 4.8 5.3 4.4 4.7 5   Power (Serna) 3.7 3.8 3.7 3.7 3.6 3.6 3.6   LSL   4700

## 2023-11-09 ENCOUNTER — ANESTHESIA EVENT (OUTPATIENT)
Dept: MEDSURG UNIT | Facility: HOSPITAL | Age: 38
DRG: 261 | End: 2023-11-09
Payer: MEDICAID

## 2023-11-09 ENCOUNTER — ANESTHESIA (OUTPATIENT)
Dept: MEDSURG UNIT | Facility: HOSPITAL | Age: 38
DRG: 261 | End: 2023-11-09
Payer: MEDICAID

## 2023-11-09 LAB
ABO + RH BLD: NORMAL
ALBUMIN SERPL BCP-MCNC: 3 G/DL (ref 3.5–5.2)
ALP SERPL-CCNC: 116 U/L (ref 55–135)
ALT SERPL W/O P-5'-P-CCNC: 10 U/L (ref 10–44)
ANION GAP SERPL CALC-SCNC: 12 MMOL/L (ref 8–16)
ANION GAP SERPL CALC-SCNC: 9 MMOL/L (ref 8–16)
APTT PPP: 33.3 SEC (ref 21–32)
AST SERPL-CCNC: 31 U/L (ref 10–40)
BASOPHILS # BLD AUTO: 0.02 K/UL (ref 0–0.2)
BASOPHILS # BLD AUTO: 0.03 K/UL (ref 0–0.2)
BASOPHILS NFR BLD: 0.2 % (ref 0–1.9)
BASOPHILS NFR BLD: 0.4 % (ref 0–1.9)
BILIRUB SERPL-MCNC: 1.1 MG/DL (ref 0.1–1)
BLD GP AB SCN CELLS X3 SERPL QL: NORMAL
BSA FOR ECHO PROCEDURE: 2.16 M2
BUN SERPL-MCNC: 15 MG/DL (ref 6–20)
BUN SERPL-MCNC: 20 MG/DL (ref 6–20)
CALCIUM SERPL-MCNC: 8.5 MG/DL (ref 8.7–10.5)
CALCIUM SERPL-MCNC: 9.9 MG/DL (ref 8.7–10.5)
CHLORIDE SERPL-SCNC: 102 MMOL/L (ref 95–110)
CHLORIDE SERPL-SCNC: 103 MMOL/L (ref 95–110)
CO2 SERPL-SCNC: 22 MMOL/L (ref 23–29)
CO2 SERPL-SCNC: 26 MMOL/L (ref 23–29)
CREAT SERPL-MCNC: 1.4 MG/DL (ref 0.5–1.4)
CREAT SERPL-MCNC: 1.5 MG/DL (ref 0.5–1.4)
DIFFERENTIAL METHOD: ABNORMAL
DIFFERENTIAL METHOD: ABNORMAL
EOSINOPHIL # BLD AUTO: 0 K/UL (ref 0–0.5)
EOSINOPHIL # BLD AUTO: 0.1 K/UL (ref 0–0.5)
EOSINOPHIL NFR BLD: 0 % (ref 0–8)
EOSINOPHIL NFR BLD: 1 % (ref 0–8)
ERYTHROCYTE [DISTWIDTH] IN BLOOD BY AUTOMATED COUNT: 20.3 % (ref 11.5–14.5)
ERYTHROCYTE [DISTWIDTH] IN BLOOD BY AUTOMATED COUNT: 21 % (ref 11.5–14.5)
EST. GFR  (NO RACE VARIABLE): >60 ML/MIN/1.73 M^2
EST. GFR  (NO RACE VARIABLE): >60 ML/MIN/1.73 M^2
GLUCOSE SERPL-MCNC: 216 MG/DL (ref 70–110)
GLUCOSE SERPL-MCNC: 224 MG/DL (ref 70–110)
HCT VFR BLD AUTO: 27.6 % (ref 40–54)
HCT VFR BLD AUTO: 31 % (ref 40–54)
HGB BLD-MCNC: 8.8 G/DL (ref 14–18)
HGB BLD-MCNC: 9.5 G/DL (ref 14–18)
IMM GRANULOCYTES # BLD AUTO: 0.01 K/UL (ref 0–0.04)
IMM GRANULOCYTES # BLD AUTO: 0.05 K/UL (ref 0–0.04)
IMM GRANULOCYTES NFR BLD AUTO: 0.1 % (ref 0–0.5)
IMM GRANULOCYTES NFR BLD AUTO: 0.4 % (ref 0–0.5)
INR PPP: 3.1 (ref 0.8–1.2)
LDH SERPL L TO P-CCNC: 349 U/L (ref 110–260)
LYMPHOCYTES # BLD AUTO: 0.5 K/UL (ref 1–4.8)
LYMPHOCYTES # BLD AUTO: 1.7 K/UL (ref 1–4.8)
LYMPHOCYTES NFR BLD: 22.1 % (ref 18–48)
LYMPHOCYTES NFR BLD: 4.5 % (ref 18–48)
MAGNESIUM SERPL-MCNC: 2 MG/DL (ref 1.6–2.6)
MCH RBC QN AUTO: 23.2 PG (ref 27–31)
MCH RBC QN AUTO: 23.3 PG (ref 27–31)
MCHC RBC AUTO-ENTMCNC: 30.6 G/DL (ref 32–36)
MCHC RBC AUTO-ENTMCNC: 31.9 G/DL (ref 32–36)
MCV RBC AUTO: 73 FL (ref 82–98)
MCV RBC AUTO: 76 FL (ref 82–98)
MONOCYTES # BLD AUTO: 0.1 K/UL (ref 0.3–1)
MONOCYTES # BLD AUTO: 0.6 K/UL (ref 0.3–1)
MONOCYTES NFR BLD: 1.2 % (ref 4–15)
MONOCYTES NFR BLD: 7.7 % (ref 4–15)
NEUTROPHILS # BLD AUTO: 11.3 K/UL (ref 1.8–7.7)
NEUTROPHILS # BLD AUTO: 5.4 K/UL (ref 1.8–7.7)
NEUTROPHILS NFR BLD: 68.7 % (ref 38–73)
NEUTROPHILS NFR BLD: 93.7 % (ref 38–73)
NRBC BLD-RTO: 0 /100 WBC
NRBC BLD-RTO: 0 /100 WBC
PLATELET # BLD AUTO: 324 K/UL (ref 150–450)
PLATELET # BLD AUTO: 342 K/UL (ref 150–450)
PMV BLD AUTO: 10.7 FL (ref 9.2–12.9)
PMV BLD AUTO: 9.8 FL (ref 9.2–12.9)
POCT GLUCOSE: 194 MG/DL (ref 70–110)
POCT GLUCOSE: 215 MG/DL (ref 70–110)
POCT GLUCOSE: 248 MG/DL (ref 70–110)
POTASSIUM SERPL-SCNC: 3.3 MMOL/L (ref 3.5–5.1)
POTASSIUM SERPL-SCNC: 5.4 MMOL/L (ref 3.5–5.1)
PROT SERPL-MCNC: 7.4 G/DL (ref 6–8.4)
PROTHROMBIN TIME: 31.3 SEC (ref 9–12.5)
RBC # BLD AUTO: 3.77 M/UL (ref 4.6–6.2)
RBC # BLD AUTO: 4.1 M/UL (ref 4.6–6.2)
SODIUM SERPL-SCNC: 137 MMOL/L (ref 136–145)
SODIUM SERPL-SCNC: 137 MMOL/L (ref 136–145)
SPECIMEN OUTDATE: NORMAL
WBC # BLD AUTO: 12 K/UL (ref 3.9–12.7)
WBC # BLD AUTO: 7.84 K/UL (ref 3.9–12.7)

## 2023-11-09 PROCEDURE — 85025 COMPLETE CBC W/AUTO DIFF WBC: CPT | Performed by: STUDENT IN AN ORGANIZED HEALTH CARE EDUCATION/TRAINING PROGRAM

## 2023-11-09 PROCEDURE — 63600175 PHARM REV CODE 636 W HCPCS: Performed by: PHYSICIAN ASSISTANT

## 2023-11-09 PROCEDURE — 20000000 HC ICU ROOM

## 2023-11-09 PROCEDURE — 37000009 HC ANESTHESIA EA ADD 15 MINS: Performed by: STUDENT IN AN ORGANIZED HEALTH CARE EDUCATION/TRAINING PROGRAM

## 2023-11-09 PROCEDURE — 36415 COLL VENOUS BLD VENIPUNCTURE: CPT | Performed by: PHYSICIAN ASSISTANT

## 2023-11-09 PROCEDURE — D9220A PRA ANESTHESIA: ICD-10-PCS | Mod: ANES,,, | Performed by: ANESTHESIOLOGY

## 2023-11-09 PROCEDURE — 99233 SBSQ HOSP IP/OBS HIGH 50: CPT | Mod: ,,, | Performed by: PHYSICIAN ASSISTANT

## 2023-11-09 PROCEDURE — 36620 ARTERIAL: ICD-10-PCS | Mod: ,,, | Performed by: ANESTHESIOLOGY

## 2023-11-09 PROCEDURE — 86920 COMPATIBILITY TEST SPIN: CPT | Performed by: STUDENT IN AN ORGANIZED HEALTH CARE EDUCATION/TRAINING PROGRAM

## 2023-11-09 PROCEDURE — 93010 ELECTROCARDIOGRAM REPORT: CPT | Mod: ,,, | Performed by: INTERNAL MEDICINE

## 2023-11-09 PROCEDURE — 85730 THROMBOPLASTIN TIME PARTIAL: CPT | Performed by: STUDENT IN AN ORGANIZED HEALTH CARE EDUCATION/TRAINING PROGRAM

## 2023-11-09 PROCEDURE — 93750 INTERROGATION VAD IN PERSON: CPT | Mod: ,,, | Performed by: INTERNAL MEDICINE

## 2023-11-09 PROCEDURE — 94761 N-INVAS EAR/PLS OXIMETRY MLT: CPT

## 2023-11-09 PROCEDURE — 99233 SBSQ HOSP IP/OBS HIGH 50: CPT | Mod: ,,, | Performed by: STUDENT IN AN ORGANIZED HEALTH CARE EDUCATION/TRAINING PROGRAM

## 2023-11-09 PROCEDURE — 25000003 PHARM REV CODE 250: Performed by: NURSE ANESTHETIST, CERTIFIED REGISTERED

## 2023-11-09 PROCEDURE — D9220A PRA ANESTHESIA: ICD-10-PCS | Mod: CRNA,,, | Performed by: NURSE ANESTHETIST, CERTIFIED REGISTERED

## 2023-11-09 PROCEDURE — 99223 PR INITIAL HOSPITAL CARE,LEVL III: ICD-10-PCS | Mod: ,,, | Performed by: STUDENT IN AN ORGANIZED HEALTH CARE EDUCATION/TRAINING PROGRAM

## 2023-11-09 PROCEDURE — D9220A PRA ANESTHESIA: Mod: ANES,,, | Performed by: ANESTHESIOLOGY

## 2023-11-09 PROCEDURE — 63600175 PHARM REV CODE 636 W HCPCS: Performed by: ANESTHESIOLOGY

## 2023-11-09 PROCEDURE — 25000003 PHARM REV CODE 250: Performed by: STUDENT IN AN ORGANIZED HEALTH CARE EDUCATION/TRAINING PROGRAM

## 2023-11-09 PROCEDURE — 93750 PR INTERROGATE VENT ASSIST DEV, IN PERSON, W PHYSICIAN ANALYSIS: ICD-10-PCS | Mod: ,,, | Performed by: INTERNAL MEDICINE

## 2023-11-09 PROCEDURE — 85025 COMPLETE CBC W/AUTO DIFF WBC: CPT | Mod: 91 | Performed by: INTERNAL MEDICINE

## 2023-11-09 PROCEDURE — 93010 EKG 12-LEAD: ICD-10-PCS | Mod: ,,, | Performed by: INTERNAL MEDICINE

## 2023-11-09 PROCEDURE — 63600175 PHARM REV CODE 636 W HCPCS

## 2023-11-09 PROCEDURE — 33241 REMOVE PULSE GENERATOR: CPT | Mod: 22 | Performed by: STUDENT IN AN ORGANIZED HEALTH CARE EDUCATION/TRAINING PROGRAM

## 2023-11-09 PROCEDURE — 93005 ELECTROCARDIOGRAM TRACING: CPT

## 2023-11-09 PROCEDURE — 36415 COLL VENOUS BLD VENIPUNCTURE: CPT | Performed by: INTERNAL MEDICINE

## 2023-11-09 PROCEDURE — 99233 PR SUBSEQUENT HOSPITAL CARE,LEVL III: ICD-10-PCS | Mod: ,,, | Performed by: PHYSICIAN ASSISTANT

## 2023-11-09 PROCEDURE — C1894 INTRO/SHEATH, NON-LASER: HCPCS | Performed by: STUDENT IN AN ORGANIZED HEALTH CARE EDUCATION/TRAINING PROGRAM

## 2023-11-09 PROCEDURE — 36620 INSERTION CATHETER ARTERY: CPT | Mod: ,,, | Performed by: ANESTHESIOLOGY

## 2023-11-09 PROCEDURE — 27201037 HC PRESSURE MONITORING SET UP

## 2023-11-09 PROCEDURE — 80053 COMPREHEN METABOLIC PANEL: CPT | Performed by: STUDENT IN AN ORGANIZED HEALTH CARE EDUCATION/TRAINING PROGRAM

## 2023-11-09 PROCEDURE — 25000003 PHARM REV CODE 250

## 2023-11-09 PROCEDURE — 63600175 PHARM REV CODE 636 W HCPCS: Performed by: NURSE ANESTHETIST, CERTIFIED REGISTERED

## 2023-11-09 PROCEDURE — 83735 ASSAY OF MAGNESIUM: CPT | Performed by: STUDENT IN AN ORGANIZED HEALTH CARE EDUCATION/TRAINING PROGRAM

## 2023-11-09 PROCEDURE — 33241 REMOVE PULSE GENERATOR: CPT | Mod: 22,51,, | Performed by: STUDENT IN AN ORGANIZED HEALTH CARE EDUCATION/TRAINING PROGRAM

## 2023-11-09 PROCEDURE — D9220A PRA ANESTHESIA: Mod: CRNA,,, | Performed by: NURSE ANESTHETIST, CERTIFIED REGISTERED

## 2023-11-09 PROCEDURE — 25000003 PHARM REV CODE 250: Performed by: PHYSICIAN ASSISTANT

## 2023-11-09 PROCEDURE — 86901 BLOOD TYPING SEROLOGIC RH(D): CPT | Performed by: PHYSICIAN ASSISTANT

## 2023-11-09 PROCEDURE — 33272 PR REMOVAL SUBQ IMPLANTABLE DEFIB ELECTRODE: ICD-10-PCS | Mod: 22,,, | Performed by: STUDENT IN AN ORGANIZED HEALTH CARE EDUCATION/TRAINING PROGRAM

## 2023-11-09 PROCEDURE — 27201423 OPTIME MED/SURG SUP & DEVICES STERILE SUPPLY: Performed by: STUDENT IN AN ORGANIZED HEALTH CARE EDUCATION/TRAINING PROGRAM

## 2023-11-09 PROCEDURE — 87075 CULTR BACTERIA EXCEPT BLOOD: CPT | Mod: 59 | Performed by: STUDENT IN AN ORGANIZED HEALTH CARE EDUCATION/TRAINING PROGRAM

## 2023-11-09 PROCEDURE — 33272 RMVL OF SUBQ DEFIBRILLATOR: CPT | Mod: 22,,, | Performed by: STUDENT IN AN ORGANIZED HEALTH CARE EDUCATION/TRAINING PROGRAM

## 2023-11-09 PROCEDURE — 99223 1ST HOSP IP/OBS HIGH 75: CPT | Mod: ,,, | Performed by: STUDENT IN AN ORGANIZED HEALTH CARE EDUCATION/TRAINING PROGRAM

## 2023-11-09 PROCEDURE — 85610 PROTHROMBIN TIME: CPT | Performed by: STUDENT IN AN ORGANIZED HEALTH CARE EDUCATION/TRAINING PROGRAM

## 2023-11-09 PROCEDURE — 63600175 PHARM REV CODE 636 W HCPCS: Performed by: STUDENT IN AN ORGANIZED HEALTH CARE EDUCATION/TRAINING PROGRAM

## 2023-11-09 PROCEDURE — 33241 PR RMV PULSE GENERATOR,SNGL/DUAL: ICD-10-PCS | Mod: 22,51,, | Performed by: STUDENT IN AN ORGANIZED HEALTH CARE EDUCATION/TRAINING PROGRAM

## 2023-11-09 PROCEDURE — 36415 COLL VENOUS BLD VENIPUNCTURE: CPT | Performed by: STUDENT IN AN ORGANIZED HEALTH CARE EDUCATION/TRAINING PROGRAM

## 2023-11-09 PROCEDURE — 87070 CULTURE OTHR SPECIMN AEROBIC: CPT | Mod: 59 | Performed by: STUDENT IN AN ORGANIZED HEALTH CARE EDUCATION/TRAINING PROGRAM

## 2023-11-09 PROCEDURE — 83615 LACTATE (LD) (LDH) ENZYME: CPT | Performed by: STUDENT IN AN ORGANIZED HEALTH CARE EDUCATION/TRAINING PROGRAM

## 2023-11-09 PROCEDURE — 37000008 HC ANESTHESIA 1ST 15 MINUTES: Performed by: STUDENT IN AN ORGANIZED HEALTH CARE EDUCATION/TRAINING PROGRAM

## 2023-11-09 PROCEDURE — 87077 CULTURE AEROBIC IDENTIFY: CPT | Mod: 59 | Performed by: STUDENT IN AN ORGANIZED HEALTH CARE EDUCATION/TRAINING PROGRAM

## 2023-11-09 PROCEDURE — 99233 PR SUBSEQUENT HOSPITAL CARE,LEVL III: ICD-10-PCS | Mod: ,,, | Performed by: STUDENT IN AN ORGANIZED HEALTH CARE EDUCATION/TRAINING PROGRAM

## 2023-11-09 PROCEDURE — 80048 BASIC METABOLIC PNL TOTAL CA: CPT | Mod: XB | Performed by: PHYSICIAN ASSISTANT

## 2023-11-09 PROCEDURE — 33272 RMVL OF SUBQ DEFIBRILLATOR: CPT | Mod: 22 | Performed by: STUDENT IN AN ORGANIZED HEALTH CARE EDUCATION/TRAINING PROGRAM

## 2023-11-09 PROCEDURE — 27000248 HC VAD-ADDITIONAL DAY

## 2023-11-09 PROCEDURE — 87186 SC STD MICRODIL/AGAR DIL: CPT | Performed by: STUDENT IN AN ORGANIZED HEALTH CARE EDUCATION/TRAINING PROGRAM

## 2023-11-09 RX ORDER — LIDOCAINE HYDROCHLORIDE 20 MG/ML
INJECTION, SOLUTION INFILTRATION; PERINEURAL
Status: DISCONTINUED | OUTPATIENT
Start: 2023-11-09 | End: 2023-11-09 | Stop reason: HOSPADM

## 2023-11-09 RX ORDER — FENTANYL CITRATE 50 UG/ML
INJECTION, SOLUTION INTRAMUSCULAR; INTRAVENOUS
Status: DISCONTINUED | OUTPATIENT
Start: 2023-11-09 | End: 2023-11-09

## 2023-11-09 RX ORDER — LIDOCAINE HYDROCHLORIDE 20 MG/ML
INJECTION INTRAVENOUS
Status: DISCONTINUED | OUTPATIENT
Start: 2023-11-09 | End: 2023-11-09

## 2023-11-09 RX ORDER — PROPOFOL 10 MG/ML
VIAL (ML) INTRAVENOUS
Status: DISCONTINUED | OUTPATIENT
Start: 2023-11-09 | End: 2023-11-09

## 2023-11-09 RX ORDER — DEXAMETHASONE SODIUM PHOSPHATE 4 MG/ML
INJECTION, SOLUTION INTRA-ARTICULAR; INTRALESIONAL; INTRAMUSCULAR; INTRAVENOUS; SOFT TISSUE
Status: DISCONTINUED | OUTPATIENT
Start: 2023-11-09 | End: 2023-11-09

## 2023-11-09 RX ORDER — MIDAZOLAM HYDROCHLORIDE 1 MG/ML
2 INJECTION INTRAMUSCULAR; INTRAVENOUS ONCE
Status: COMPLETED | OUTPATIENT
Start: 2023-11-09 | End: 2023-11-09

## 2023-11-09 RX ORDER — BUPIVACAINE HYDROCHLORIDE 2.5 MG/ML
INJECTION, SOLUTION EPIDURAL; INFILTRATION; INTRACAUDAL
Status: DISCONTINUED | OUTPATIENT
Start: 2023-11-09 | End: 2023-11-09 | Stop reason: HOSPADM

## 2023-11-09 RX ORDER — FENTANYL CITRATE 50 UG/ML
INJECTION, SOLUTION INTRAMUSCULAR; INTRAVENOUS
Status: COMPLETED
Start: 2023-11-09 | End: 2023-11-09

## 2023-11-09 RX ORDER — MIDAZOLAM HYDROCHLORIDE 1 MG/ML
INJECTION, SOLUTION INTRAMUSCULAR; INTRAVENOUS
Status: DISCONTINUED | OUTPATIENT
Start: 2023-11-09 | End: 2023-11-09

## 2023-11-09 RX ORDER — CEFAZOLIN 2 G/1
INJECTION, POWDER, FOR SOLUTION INTRAMUSCULAR; INTRAVENOUS
Status: DISCONTINUED | OUTPATIENT
Start: 2023-11-09 | End: 2023-11-09

## 2023-11-09 RX ORDER — SODIUM CHLORIDE 0.9 G/100ML
IRRIGANT IRRIGATION
Status: DISCONTINUED | OUTPATIENT
Start: 2023-11-09 | End: 2023-11-09 | Stop reason: HOSPADM

## 2023-11-09 RX ORDER — SODIUM CHLORIDE 0.9 % (FLUSH) 0.9 %
3 SYRINGE (ML) INJECTION
Status: DISCONTINUED | OUTPATIENT
Start: 2023-11-09 | End: 2023-11-09 | Stop reason: HOSPADM

## 2023-11-09 RX ORDER — FENTANYL CITRATE 50 UG/ML
25 INJECTION, SOLUTION INTRAMUSCULAR; INTRAVENOUS EVERY 5 MIN PRN
Status: COMPLETED | OUTPATIENT
Start: 2023-11-09 | End: 2023-11-09

## 2023-11-09 RX ORDER — ONDANSETRON 2 MG/ML
INJECTION INTRAMUSCULAR; INTRAVENOUS
Status: DISCONTINUED | OUTPATIENT
Start: 2023-11-09 | End: 2023-11-09

## 2023-11-09 RX ORDER — ROCURONIUM BROMIDE 10 MG/ML
INJECTION, SOLUTION INTRAVENOUS
Status: DISCONTINUED | OUTPATIENT
Start: 2023-11-09 | End: 2023-11-09

## 2023-11-09 RX ORDER — FENTANYL CITRATE 50 UG/ML
25 INJECTION, SOLUTION INTRAMUSCULAR; INTRAVENOUS EVERY 5 MIN PRN
Status: DISCONTINUED | OUTPATIENT
Start: 2023-11-09 | End: 2023-11-09 | Stop reason: HOSPADM

## 2023-11-09 RX ORDER — VANCOMYCIN HYDROCHLORIDE 1 G/20ML
INJECTION, POWDER, LYOPHILIZED, FOR SOLUTION INTRAVENOUS
Status: DISCONTINUED | OUTPATIENT
Start: 2023-11-09 | End: 2023-11-09 | Stop reason: HOSPADM

## 2023-11-09 RX ADMIN — SODIUM CHLORIDE: 0.9 INJECTION, SOLUTION INTRAVENOUS at 12:11

## 2023-11-09 RX ADMIN — FENTANYL CITRATE 25 MCG: 50 INJECTION INTRAMUSCULAR; INTRAVENOUS at 06:11

## 2023-11-09 RX ADMIN — SPIRONOLACTONE 25 MG: 25 TABLET ORAL at 09:11

## 2023-11-09 RX ADMIN — FENTANYL CITRATE 25 MCG: 50 INJECTION INTRAMUSCULAR; INTRAVENOUS at 05:11

## 2023-11-09 RX ADMIN — ROCURONIUM BROMIDE 20 MG: 10 INJECTION INTRAVENOUS at 02:11

## 2023-11-09 RX ADMIN — FENTANYL CITRATE 25 MCG: 50 INJECTION INTRAMUSCULAR; INTRAVENOUS at 09:11

## 2023-11-09 RX ADMIN — PROPOFOL 20 MG: 10 INJECTION, EMULSION INTRAVENOUS at 12:11

## 2023-11-09 RX ADMIN — LIDOCAINE HYDROCHLORIDE 50 MG: 20 INJECTION INTRAVENOUS at 12:11

## 2023-11-09 RX ADMIN — ONDANSETRON 4 MG: 2 INJECTION INTRAMUSCULAR; INTRAVENOUS at 03:11

## 2023-11-09 RX ADMIN — FENTANYL CITRATE 12.5 MCG: 50 INJECTION, SOLUTION INTRAMUSCULAR; INTRAVENOUS at 04:11

## 2023-11-09 RX ADMIN — SUGAMMADEX 200 MG: 100 INJECTION, SOLUTION INTRAVENOUS at 03:11

## 2023-11-09 RX ADMIN — FENTANYL CITRATE 50 MCG: 50 INJECTION, SOLUTION INTRAMUSCULAR; INTRAVENOUS at 12:11

## 2023-11-09 RX ADMIN — FENTANYL CITRATE 25 MCG: 50 INJECTION INTRAMUSCULAR; INTRAVENOUS at 07:11

## 2023-11-09 RX ADMIN — LEVETIRACETAM 1000 MG: 500 TABLET, FILM COATED ORAL at 09:11

## 2023-11-09 RX ADMIN — PANTOPRAZOLE SODIUM 40 MG: 40 TABLET, DELAYED RELEASE ORAL at 09:11

## 2023-11-09 RX ADMIN — ASPIRIN 81 MG: 81 TABLET, COATED ORAL at 09:11

## 2023-11-09 RX ADMIN — MIDAZOLAM 2 MG: 1 INJECTION INTRAMUSCULAR; INTRAVENOUS at 09:11

## 2023-11-09 RX ADMIN — FUROSEMIDE 80 MG: 80 TABLET ORAL at 09:11

## 2023-11-09 RX ADMIN — FENTANYL CITRATE 25 MCG: 50 INJECTION INTRAMUSCULAR; INTRAVENOUS at 08:11

## 2023-11-09 RX ADMIN — MIDAZOLAM HYDROCHLORIDE 2 MG: 1 INJECTION, SOLUTION INTRAMUSCULAR; INTRAVENOUS at 12:11

## 2023-11-09 RX ADMIN — CEFAZOLIN 2 G: 2 INJECTION, POWDER, FOR SOLUTION INTRAMUSCULAR; INTRAVENOUS at 01:11

## 2023-11-09 RX ADMIN — ROCURONIUM BROMIDE 10 MG: 10 INJECTION INTRAVENOUS at 03:11

## 2023-11-09 RX ADMIN — MILRINONE LACTATE IN DEXTROSE 0.25 MCG/KG/MIN: 200 INJECTION, SOLUTION INTRAVENOUS at 07:11

## 2023-11-09 RX ADMIN — ROCURONIUM BROMIDE 10 MG: 10 INJECTION INTRAVENOUS at 01:11

## 2023-11-09 RX ADMIN — FENTANYL CITRATE 25 MCG: 50 INJECTION, SOLUTION INTRAMUSCULAR; INTRAVENOUS at 02:11

## 2023-11-09 RX ADMIN — DEXAMETHASONE SODIUM PHOSPHATE 4 MG: 4 INJECTION, SOLUTION INTRAMUSCULAR; INTRAVENOUS at 01:11

## 2023-11-09 RX ADMIN — PIPERACILLIN SODIUM AND TAZOBACTAM SODIUM 4.5 G: 4; .5 INJECTION, POWDER, FOR SOLUTION INTRAVENOUS at 06:11

## 2023-11-09 RX ADMIN — ATORVASTATIN CALCIUM 40 MG: 40 TABLET, FILM COATED ORAL at 09:11

## 2023-11-09 RX ADMIN — ROCURONIUM BROMIDE 50 MG: 10 INJECTION INTRAVENOUS at 12:11

## 2023-11-09 RX ADMIN — FENTANYL CITRATE 12.5 MCG: 50 INJECTION, SOLUTION INTRAMUSCULAR; INTRAVENOUS at 03:11

## 2023-11-09 RX ADMIN — PROPOFOL 40 MG: 10 INJECTION, EMULSION INTRAVENOUS at 12:11

## 2023-11-09 RX ADMIN — Medication 400 MG: at 09:11

## 2023-11-09 RX ADMIN — INSULIN ASPART 2 UNITS: 100 INJECTION, SOLUTION INTRAVENOUS; SUBCUTANEOUS at 09:11

## 2023-11-09 RX ADMIN — MILRINONE LACTATE IN DEXTROSE 0.25 MCG/KG/MIN: 200 INJECTION, SOLUTION INTRAVENOUS at 04:11

## 2023-11-09 NOTE — SUBJECTIVE & OBJECTIVE
Interval History: No acute events overnight. Getting his ICD extracted this afternoon. On broad spectrum abx. Cultures pending.     Continuous Infusions:   milrinone 20mg/100ml D5W (200mcg/ml) 0.25 mcg/kg/min (11/09/23 0454)     Scheduled Meds:   aspirin  81 mg Oral Daily    atorvastatin  40 mg Oral Daily    furosemide  80 mg Oral Daily    levETIRAcetam  1,000 mg Oral BID    magnesium oxide  400 mg Oral Daily    mirtazapine  15 mg Oral Nightly    pantoprazole  40 mg Oral Daily    spironolactone  25 mg Oral Daily    warfarin  4 mg Oral Daily     PRN Meds:acetaminophen, dextrose 10%, dextrose 10%, glucagon (human recombinant), glucose, glucose, insulin aspart U-100, sodium chloride 0.9%    Review of patient's allergies indicates:   Allergen Reactions    Bumex [bumetanide] Hives    Torsemide Hives     Objective:     Vital Signs (Most Recent):  Temp: 97.7 °F (36.5 °C) (11/09/23 0750)  Pulse: 95 (11/09/23 1107)  Resp: 18 (11/09/23 0750)  BP: 115/68 (11/09/23 0752)  SpO2: 99 % (11/09/23 0750) Vital Signs (24h Range):  Temp:  [97.5 °F (36.4 °C)-98.2 °F (36.8 °C)] 97.7 °F (36.5 °C)  Pulse:  [] 95  Resp:  [17-18] 18  SpO2:  [94 %-99 %] 99 %  BP: ()/(0-69) 115/68     Patient Vitals for the past 72 hrs (Last 3 readings):   Weight   11/09/23 0750 88.3 kg (194 lb 10.7 oz)   11/08/23 1842 92 kg (202 lb 13.2 oz)   11/08/23 1252 94.3 kg (208 lb)     Body mass index is 24.33 kg/m².      Intake/Output Summary (Last 24 hours) at 11/9/2023 1129  Last data filed at 11/9/2023 0630  Gross per 24 hour   Intake 300 ml   Output 3150 ml   Net -2850 ml       Hemodynamic Parameters:            Physical Exam  Constitutional:       Appearance: Normal appearance.   HENT:      Head: Normocephalic and atraumatic.   Neck:      Vascular: JVD present.      Comments: +JVD  Cardiovascular:      Rate and Rhythm: Normal rate and regular rhythm.      Pulses: Normal pulses.      Heart sounds: Normal heart sounds.      Comments: LVAD hum  smooth  Pulmonary:      Effort: Pulmonary effort is normal.      Breath sounds: Normal breath sounds.   Chest:      Comments: AICD covered with dressing.  Abdominal:      General: Bowel sounds are normal. There is no distension.      Palpations: Abdomen is soft.      Tenderness: There is no abdominal tenderness.   Musculoskeletal:         General: Normal range of motion.      Cervical back: Normal range of motion and neck supple.      Right lower leg: No edema.   Skin:     General: Skin is warm and dry.      Capillary Refill: Capillary refill takes 2 to 3 seconds.   Neurological:      General: No focal deficit present.      Mental Status: He is alert and oriented to person, place, and time.   Psychiatric:         Mood and Affect: Mood normal.         Behavior: Behavior normal.            Significant Labs:  CBC:  Recent Labs   Lab 11/08/23  1407 11/09/23  0635   WBC 9.21 7.84   RBC 4.29* 4.10*   HGB 9.8* 9.5*   HCT 31.5* 31.0*    324   MCV 73* 76*   MCH 22.8* 23.2*   MCHC 31.1* 30.6*     BNP:  Recent Labs   Lab 11/08/23  1407   *     CMP:  Recent Labs   Lab 11/08/23  1407 11/09/23  0635 11/09/23  0946   * 224* 216*   CALCIUM 9.3 9.9 8.5*   ALBUMIN 3.4* 3.0*  --    PROT 8.2 7.4  --     137 137   K 3.3* 5.4* 3.3*   CO2 27 22* 26   CL 96 103 102   BUN 18 20 15   CREATININE 1.5* 1.5* 1.4   ALKPHOS 127 116  --    ALT 11 10  --    AST 30 31  --    BILITOT 1.7* 1.1*  --       Coagulation:   Recent Labs   Lab 11/08/23  1420 11/09/23  0635   INR 2.9* 3.1*   APTT  --  33.3*     LDH:  Recent Labs   Lab 11/09/23  0635   *     Microbiology:  Microbiology Results (last 7 days)       Procedure Component Value Units Date/Time    Blood culture x two cultures. Draw prior to antibiotics. [[id number]] Collected: 11/08/23 1407    Order Status: Completed Specimen: Blood from Peripheral, Lower Arm, Left Updated: 11/08/23 2145     Blood Culture, Routine No Growth to date    Narrative:      Aerobic and  anaerobic    Blood culture x two cultures. Draw prior to antibiotics. [[id number]] Collected: 11/08/23 1408    Order Status: Completed Specimen: Blood from Peripheral, Antecubital, Left Updated: 11/08/23 0725     Blood Culture, Routine No Growth to date    Narrative:      Aerobic and anaerobic            I have reviewed all pertinent labs within the past 24 hours.    Estimated Creatinine Clearance: 85.5 mL/min (based on SCr of 1.4 mg/dL).    Diagnostic Results:  I have reviewed all pertinent imaging results/findings within the past 24 hours.

## 2023-11-09 NOTE — CONSULTS
"[facility] - Cardiology  Adult Nutrition  Consult Note    SUMMARY     Recommendations    1) 310D + 105AA + IV Lipids to provide 1974 kcals, 105g PRO, GIR: 2.44   2) ADAT per MD   3) RD Following    Goals: Meet % een/epn by next RD f/u  Nutrition Goal Status: new  Communication of RD Recs: other (comment) (POC)    Assessment and Plan    Nutrition Problem  Inadequate energy intake    Related to (etiology):   Inadequate oral intake    Signs and Symptoms (as evidenced by):   NPO     Interventions/Recommendations (treatment strategy):  Collaboration with other providers    Nutrition Diagnosis Status:   New      Reason for Assessment    Reason For Assessment: consult  Diagnosis: cardiac disease  Relevant Medical History: LVAD 6/5/2023,CHF, DMT2, drug abuse  Interdisciplinary Rounds: did not attend  General Information Comments: RD consult. Pt's NPO w/ no nutrition at this time. PICC team consulted. 7% wt loss in 2 months, possibly d/t fluid status. RD following.       Nutrition Discharge Planning: adequate intake    Nutrition Risk Screen    Nutrition Risk Screen: no indicators present    Nutrition/Diet History    Spiritual, Cultural Beliefs, Jewish Practices, Values that Affect Care: no  Food Allergies: NKFA    Anthropometrics    Temp: 97.5 °F (36.4 °C)  Height Method: Stated  Height: 6' 3" (190.5 cm)  Height (inches): 75 in  Weight Method: Standard Scale  Weight: 88 kg (194 lb)  Weight (lb): 194 lb  Ideal Body Weight (IBW), Male: 196 lb  % Ideal Body Weight, Male (lb): 98.98 %  BMI (Calculated): 24.2  BMI Grade: 18.5-24.9 - normal       Lab/Procedures/Meds    Pertinent Labs Reviewed: reviewed  Pertinent Labs Comments: n/a  Pertinent Medications Reviewed: reviewed  Pertinent Medications Comments: Atorvastatin, ABX, magnesium oxide, pantoprazole, warfarin      Estimated/Assessed Needs    Weight Used For Calorie Calculations: 88 kg (194 lb)  Energy Calorie Requirements (kcal): 3206-8920 (20-25 kcal/kg)  Energy Need " Method: Kcal/kg  Protein Requirements: 105 (1.2 g/kg)  Weight Used For Protein Calculations: 88 kg (194 lb)     Estimated Fluid Requirement Method: RDA Method  RDA Method (mL): 1759  CHO Requirement: 219-274      Nutrition Prescription Ordered    Current Diet Order: NPO    Evaluation of Received Nutrient/Fluid Intake    I/O: -2.8 L since admit  Comments: LBM 11/8  % Intake of Estimated Energy Needs: 0 - 25 %  % Meal Intake: NPO    Nutrition Risk    Level of Risk/Frequency of Follow-up: low (f/u 1x/week)       Monitor and Evaluation    Food and Nutrient Intake: energy intake, food and beverage intake  Physical Activity and Function: nutrition-related ADLs and IADLs  Anthropometric Measurements: height/length, weight, weight change, body mass index  Biochemical Data, Medical Tests and Procedures: electrolyte and renal panel, gastrointestinal profile, glucose/endocrine profile, inflammatory profile, lipid profile       Nutrition Follow-Up    RD Follow-up?: Yes  Sneha Hong RD, LDN

## 2023-11-09 NOTE — SUBJECTIVE & OBJECTIVE
Interval:  NAEON. Plan for extraction today.    Past Medical History:   Diagnosis Date    Acute respiratory failure with hypoxia 7/22/2023    Arthritis     Awareness alteration, transient 9/1/2022    Cardiomyopathy     CHF (congestive heart failure) 10/01/2020    COVID-19 6/3/2023    Diabetes mellitus     Dilated cardiomyopathy 10/26/2020    Drug abuse 10/2020    Headache 4/19/2023    Hyperglycemia 12/16/2022    Hyperosmolar hyperglycemic state (HHS) 5/25/2022    ICD (implantable cardioverter-defibrillator) in place 10/26/2020    Left ventricular assist device (LVAD) complication, initial encounter 6/5/2023    Muscle cramping 6/15/2022    Renal disorder     SOB (shortness of breath) 6/13/2022    Tingling in extremities 7/13/2022       Past Surgical History:   Procedure Laterality Date    APPLICATION OF WOUND VACUUM-ASSISTED CLOSURE DEVICE N/A 6/30/2022    Procedure: APPLICATION, WOUND VAC;  Surgeon: Luis F Paige MD;  [facility];  Service: Cardiovascular;  Laterality: N/A;  50 x 5 cm    CARDIAC DEFIBRILLATOR PLACEMENT      IMPLANTATION OF RIGHT VENTRICULAR ASSIST DEVICE (RVAD) N/A 6/29/2022    Procedure: INSERTION, RVAD;  Surgeon: Luis F Paige MD;  Location: [facility] OR 2ND FLR;  Service: Cardiovascular;  Laterality: N/A;    INSERTION OF GRAFT TO PERICARDIUM Right 6/30/2022    Procedure: INSERTION-RIGHT VENTRICULAR ASSIST DEVICE;  Surgeon: Luis F Paige MD;  Location: [facility] OR 2ND FLR;  Service: Cardiovascular;  Laterality: Right;    IRRIGATION OF MEDIASTINUM  6/30/2022    Procedure: IRRIGATION, MEDIASTINUM;  Surgeon: Luis F Paige MD;  Location: [facility] OR 2ND FLR;  Service: Cardiovascular;;    LEFT VENTRICULAR ASSIST DEVICE Left 6/23/2022    Procedure: INSERTION-LEFT VENTRICULAR ASSIST DEVICE;  Surgeon: Luis F Paige MD;  [facility];  Service: Cardiovascular;  Laterality: Left;    LEFT VENTRICULAR ASSIST DEVICE N/A 6/29/2022    Procedure: INSERTION-LEFT VENTRICULAR ASSIST DEVICE;   Surgeon: Luis F Paige MD;  Location: [facility] OR [address] [place] [address];  Service: Cardiovascular;  Laterality: N/A;    RIGHT HEART CATHETERIZATION Right 4/8/2022    Procedure: INSERTION, CATHETER, RIGHT HEART;  Surgeon: Luca Lopez Jr., MD;  Location: [facility] CATH LAB;  Service: Cardiology;  Laterality: Right;    RIGHT HEART CATHETERIZATION Right 4/19/2022    Procedure: INSERTION, CATHETER, RIGHT HEART;  Surgeon: Josh Pulido MD;  Location: [facility] CATH LAB;  Service: Cardiology;  Laterality: Right;    RIGHT HEART CATHETERIZATION Right 7/21/2022    Procedure: INSERTION, CATHETER, RIGHT HEART;  Surgeon: Dalia Crum MD;  Location: [facility] CATH LAB;  Service: Cardiology;  Laterality: Right;    RIGHT HEART CATHETERIZATION Right 10/31/2022    Procedure: INSERTION, CATHETER, RIGHT HEART;  Surgeon: Dalia Crum MD;  Location: [facility] CATH LAB;  Service: Cardiology;  Laterality: Right;    STERNAL WOUND CLOSURE N/A 6/30/2022    Procedure: CLOSURE, WOUND, STERNUM;  Surgeon: Luis F Paige MD;  [address];  Service: Cardiovascular;  Laterality: N/A;       Review of patient's allergies indicates:   Allergen Reactions    Bumex [bumetanide] Hives    Torsemide Hives       No current facility-administered medications on file prior to encounter.     Current Outpatient Medications on File Prior to Encounter   Medication Sig    levETIRAcetam (KEPPRA) 1000 MG tablet TAKE 1 TABLET(1000 MG) BY MOUTH TWICE DAILY    warfarin (COUMADIN) 3 MG tablet Take 1.5-2 tablets (4.5-6 mg total) by mouth Daily. Take 1.5 tablets (4.5mg) orally daily, except 2 tablets (6mg) on Wednesdays and Saturdays    acetaminophen (TYLENOL) 325 MG tablet Take 2 tablets (650 mg total) by mouth every 6 (six) hours as needed for Pain.    aspirin (ECOTRIN) 81 MG EC tablet Take 1 tablet (81 mg total) by mouth once daily.    atorvastatin (LIPITOR) 40 MG tablet Take 1 tablet (40 mg total) by mouth once daily.    blood sugar diagnostic Strp Use to test blood  "glucose 4 (four) times daily.    blood-glucose meter Misc Use as instructed    busPIRone (BUSPAR) 10 MG tablet Take 10 mg by mouth 2 (two) times daily.    cefadroxil (DURICEF) 500 MG Cap Take 1 capsule (500 mg total) by mouth every 12 (twelve) hours.    enoxaparin (LOVENOX) 40 mg/0.4 mL Syrg Inject 0.4 mLs (40 mg total) into the skin every 12 (twelve) hours.    furosemide (LASIX) 80 MG tablet Take 1 tablet (80 mg total) by mouth once daily.    insulin aspart U-100 (NOVOLOG) 100 unit/mL (3 mL) InPn pen Inject 6 Units into the skin 3 (three) times daily with meals. Plus Sliding Scale: 151-200: +1, 201-250: +2, 251-300: +3, 301-350: +4 and call MD    insulin detemir U-100, Levemir, 100 unit/mL (3 mL) SubQ InPn pen Inject 8 Units into the skin 2 (two) times daily.    lancets 33 gauge Misc Use to test blood glucose 4 (four) times daily.    magnesium oxide (MAG-OX) 400 mg (241.3 mg magnesium) tablet Take 1 tablet (400 mg total) by mouth once daily.    milrinone 20mg/100ml D5W, 200mcg/ml, (PRIMACOR) 20 mg/100 mL (200 mcg/mL) infusion Inject 23.6 mcg/min into the vein continuous.    mirtazapine (REMERON) 15 MG tablet Take 1 tablet (15 mg total) by mouth nightly.    pantoprazole (PROTONIX) 40 MG tablet Take 1 tablet (40 mg total) by mouth once daily.    pen needle, diabetic 32 gauge x 5/32" Ndle Use to inject insulin 5 (five) times daily.    polyethylene glycol (GLYCOLAX) 17 gram PwPk Take 17 g by mouth once daily.    senna (SENOKOT) 8.6 mg tablet Take 1 tablet by mouth once daily.    spironolactone (ALDACTONE) 25 MG tablet Take 1 tablet (25 mg total) by mouth once daily.    valsartan (DIOVAN) 40 MG tablet Take 1 tablet (40 mg total) by mouth 2 (two) times daily.    [DISCONTINUED] digoxin (LANOXIN) 125 mcg tablet Take 1 tablet (0.125 mg total) by mouth once daily.    [DISCONTINUED] EScitalopram oxalate (LEXAPRO) 5 MG Tab Take 1 tablet (5 mg total) by mouth once daily.    [DISCONTINUED] insulin (LANTUS SOLOSTAR U-100 INSULIN) " glargine 100 units/mL (3mL) SubQ pen Inject 10 Units into the skin every evening. (Patient not taking: Reported on 2022)    [DISCONTINUED] metOLazone (ZAROXOLYN) 2.5 MG tablet Take 2.5 mg by mouth every other day.    [DISCONTINUED] metoprolol succinate (TOPROL-XL) 25 MG 24 hr tablet TAKE 1 TABLET(25 MG) BY MOUTH EVERY DAY     Family History       Problem Relation (Age of Onset)    Diverticulosis Brother    Heart attack Maternal Grandmother, Maternal Grandfather    Heart failure Father          Tobacco Use    Smoking status: Former     Current packs/day: 0.00     Average packs/day: 0.5 packs/day for 16.6 years (8.3 ttl pk-yrs)     Types: Cigarettes     Start date: 10/1/2004     Quit date: 2021     Years since quittin.5    Smokeless tobacco: Never   Substance and Sexual Activity    Alcohol use: Not Currently    Drug use: Not Currently     Types: Marijuana, MDMA (Ecstacy)    Sexual activity: Yes     Partners: Female     Birth control/protection: None     Review of Systems   Constitutional: Negative for chills, fever, malaise/fatigue and night sweats.   HENT:  Negative for congestion, nosebleeds, sore throat, stridor and tinnitus.    Eyes:  Negative for blurred vision, discharge, double vision, pain, vision loss in left eye, vision loss in right eye, visual disturbance and visual halos.   Cardiovascular:  Negative for chest pain, dyspnea on exertion, leg swelling, near-syncope, orthopnea, palpitations and syncope.        Tenderness over ICD site   Respiratory:  Negative for cough, hemoptysis, shortness of breath, snoring, sputum production and wheezing.    Endocrine: Negative for cold intolerance, heat intolerance and polydipsia.   Hematologic/Lymphatic: Negative for adenopathy and bleeding problem. Does not bruise/bleed easily.   Skin:  Negative for color change, dry skin, flushing, poor wound healing and suspicious lesions.   Musculoskeletal:  Negative for arthritis, back pain, gout, joint pain and joint  swelling.   Gastrointestinal:  Negative for bloating, abdominal pain, constipation and diarrhea.   Genitourinary:  Negative for dysuria, frequency and hematuria.   Neurological:  Negative for dizziness, focal weakness, headaches, light-headedness, loss of balance, numbness and weakness.   Psychiatric/Behavioral:  Negative for altered mental status, hallucinations and hypervigilance. The patient is not nervous/anxious.      Objective:     Vital Signs (Most Recent):  Temp: 97.5 °F (36.4 °C) (11/09/23 0517)  Pulse: 104 (11/09/23 0517)  Resp: 17 (11/09/23 0517)  BP: (!) 70/0 (11/09/23 0517)  SpO2: 99 % (11/09/23 0517) Vital Signs (24h Range):  Temp:  [97.5 °F (36.4 °C)-98.2 °F (36.8 °C)] 97.5 °F (36.4 °C)  Pulse:  [] 104  Resp:  [17-18] 17  SpO2:  [94 %-99 %] 99 %  BP: (68-99)/(0-69) 70/0       Weight: 92 kg (202 lb 13.2 oz)  Body mass index is 25.35 kg/m².    SpO2: 99 %        Physical Exam  Constitutional:       General: He is not in acute distress.     Appearance: Normal appearance. He is normal weight. He is not toxic-appearing.   HENT:      Head: Normocephalic and atraumatic.   Eyes:      General:         Right eye: No discharge.         Left eye: No discharge.      Extraocular Movements: Extraocular movements intact.      Conjunctiva/sclera: Conjunctivae normal.      Pupils: Pupils are equal, round, and reactive to light.   Cardiovascular:      Rate and Rhythm: Normal rate and regular rhythm.      Pulses: Normal pulses.      Heart sounds: Normal heart sounds. No murmur heard.     No friction rub.      Comments: Half of ICD protruding from L upper chest wall.   Pulmonary:      Effort: Pulmonary effort is normal. No respiratory distress.      Breath sounds: Normal breath sounds. No wheezing or rales.   Abdominal:      General: Abdomen is flat. Bowel sounds are normal. There is no distension.      Palpations: Abdomen is soft.      Tenderness: There is no abdominal tenderness. There is no guarding.    Musculoskeletal:         General: No swelling, tenderness or deformity. Normal range of motion.      Cervical back: Normal range of motion and neck supple. No rigidity.      Right lower leg: No edema.      Left lower leg: No edema.   Skin:     General: Skin is warm and dry.      Capillary Refill: Capillary refill takes less than 2 seconds.      Coloration: Skin is not jaundiced or pale.      Findings: No bruising.   Neurological:      General: No focal deficit present.      Mental Status: He is alert and oriented to person, place, and time. Mental status is at baseline.   Psychiatric:         Mood and Affect: Mood normal.         Thought Content: Thought content normal.         Judgment: Judgment normal.

## 2023-11-09 NOTE — PROGRESS NOTES
Pharmacokinetic Initial Assessment: IV Vancomycin    Assessment/Plan:    Initiate intravenous vancomycin with loading dose of 2000 mg once followed by a maintenance dose of vancomycin 1000 mg IV every 12 hours  Desired empiric serum trough concentration is 10 to 20 mcg/mL  Draw vancomycin trough level 60 min prior to fourth dose on 11/10 at approximately 1530  Pharmacy will continue to follow and monitor vancomycin.      Please contact pharmacy at extension [phone] with any questions regarding this assessment.     Thank you for the consult,   Jojo Noe       Patient brief summary:  Kevan Queen is a 38 y.o. male initiated on antimicrobial therapy with IV Vancomycin for treatment of suspected  ICD infection    Drug Allergies:   Review of patient's allergies indicates:   Allergen Reactions    Bumex [bumetanide] Hives    Torsemide Hives       Actual Body Weight:   88 kg     Renal Function:   Estimated Creatinine Clearance: 85.5 mL/min (based on SCr of 1.4 mg/dL).,     Dialysis Method (if applicable):  N/A    CBC (last 72 hours):  Recent Labs   Lab Result Units 11/08/23  1407 11/09/23  0635   WBC K/uL 9.21 7.84   Hemoglobin g/dL 9.8* 9.5*   Hematocrit % 31.5* 31.0*   Platelets K/uL 350 324   Gran % % 68.4 68.7   Lymph % % 23.2 22.1   Mono % % 7.4 7.7   Eosinophil % % 0.3 1.0   Basophil % % 0.5 0.4   Differential Method  Automated Automated       Metabolic Panel (last 72 hours):  Recent Labs   Lab Result Units 11/08/23  1407 11/08/23  1516 11/09/23  0635 11/09/23  0946   Sodium mmol/L 137  --  137 137   Potassium mmol/L 3.3*  --  5.4* 3.3*   Chloride mmol/L 96  --  103 102   CO2 mmol/L 27  --  22* 26   Glucose mg/dL 177*  --  224* 216*   Glucose, UA   --  Negative  --   --    BUN mg/dL 18  --  20 15   Creatinine mg/dL 1.5*  --  1.5* 1.4   Albumin g/dL 3.4*  --  3.0*  --    Total Bilirubin mg/dL 1.7*  --  1.1*  --    Alkaline Phosphatase U/L 127  --  116  --    AST U/L 30  --  31  --    ALT U/L 11  --  10  --   "  Magnesium mg/dL 1.6  --  2.0  --        Drug levels (last 3 results):  No results for input(s): "VANCOMYCINRA", "VANCORANDOM", "VANCOMYCINPE", "VANCOPEAK", "VANCOMYCINTR", "VANCOTROUGH" in the last 72 hours.    Microbiologic Results:  Microbiology Results (last 7 days)       Procedure Component Value Units Date/Time    Culture, Anaerobe [[id number]] Collected: 11/09/23 1400    Order Status: Sent Specimen: Incision site from Chest, Left Updated: 11/09/23 1503    Aerobic culture [[id number]] Collected: 11/09/23 1400    Order Status: Sent Specimen: Incision site from Chest, Left Updated: 11/09/23 1503    Culture, Anaerobe [[id number]] Collected: 11/09/23 1400    Order Status: Sent Specimen: Incision site from Chest, Left Updated: 11/09/23 1501    Aerobic culture [[id number]] Collected: 11/09/23 1400    Order Status: Sent Specimen: Incision site from Chest, Left Updated: 11/09/23 1501    Culture, Anaerobe [[id number]] Collected: 11/09/23 1400    Order Status: Sent Specimen: Incision site from Chest, Left Updated: 11/09/23 1500    Aerobic culture [[id number]] Collected: 11/09/23 1400    Order Status: Sent Specimen: Incision site from Chest, Left Updated: 11/09/23 1500    Culture, Anaerobe [[id number]] Collected: 11/09/23 1430    Order Status: Sent Specimen: Incision site from Chest, Left Updated: 11/09/23 1459    Aerobic culture [[id number]] Collected: 11/09/23 1430    Order Status: Sent Specimen: Incision site from Chest, Left Updated: 11/09/23 1459    Culture, Anaerobe [[id number]] Collected: 11/09/23 1400    Order Status: Sent Specimen: Incision site from Chest, Left Updated: 11/09/23 1457    Aerobic culture [[id number]] Collected: 11/09/23 1400    Order Status: Sent Specimen: Incision site from Chest, Left Updated: 11/09/23 1457    Aerobic culture [[id number]] Collected: 11/09/23 1415    Order Status: Sent Specimen: Incision site from Chest, Left Updated: 11/09/23 1454    Culture, Anaerobe [[id number]] Collected: " 11/09/23 1415    Order Status: Sent Specimen: Incision site from Chest, Left Updated: 11/09/23 1454    Blood culture x two cultures. Draw prior to antibiotics. [[id number]] Collected: 11/08/23 1407    Order Status: Completed Specimen: Blood from Peripheral, Lower Arm, Left Updated: 11/08/23 2145     Blood Culture, Routine No Growth to date    Narrative:      Aerobic and anaerobic    Blood culture x two cultures. Draw prior to antibiotics. [[id number]] Collected: 11/08/23 1408    Order Status: Completed Specimen: Blood from Peripheral, Antecubital, Left Updated: 11/08/23 2145     Blood Culture, Routine No Growth to date    Narrative:      Aerobic and anaerobic              delivery delivered    H/O laminectomy

## 2023-11-09 NOTE — PROGRESS NOTES
[facility] - Cardiology Stepdown  Heart Transplant  Progress Note    Patient Name: Kevan Queen  MRN: [id number]  Admission Date: 11/8/2023  Hospital Length of Stay: 1 days  Attending Physician: Josh Ryan, *  Primary Care Provider: Rosio Armendariz FNP  Principal Problem:ICD (implantable cardioverter-defibrillator) malfunction    Subjective:     Interval History: No acute events overnight. Getting his ICD extracted this afternoon. On broad spectrum abx. Cultures pending.     Continuous Infusions:   milrinone 20mg/100ml D5W (200mcg/ml) 0.25 mcg/kg/min (11/09/23 0454)     Scheduled Meds:   aspirin  81 mg Oral Daily    atorvastatin  40 mg Oral Daily    furosemide  80 mg Oral Daily    levETIRAcetam  1,000 mg Oral BID    magnesium oxide  400 mg Oral Daily    mirtazapine  15 mg Oral Nightly    pantoprazole  40 mg Oral Daily    spironolactone  25 mg Oral Daily    warfarin  4 mg Oral Daily     PRN Meds:acetaminophen, dextrose 10%, dextrose 10%, glucagon (human recombinant), glucose, glucose, insulin aspart U-100, sodium chloride 0.9%    Review of patient's allergies indicates:   Allergen Reactions    Bumex [bumetanide] Hives    Torsemide Hives     Objective:     Vital Signs (Most Recent):  Temp: 97.7 °F (36.5 °C) (11/09/23 0750)  Pulse: 95 (11/09/23 1107)  Resp: 18 (11/09/23 0750)  BP: 115/68 (11/09/23 0752)  SpO2: 99 % (11/09/23 0750) Vital Signs (24h Range):  Temp:  [97.5 °F (36.4 °C)-98.2 °F (36.8 °C)] 97.7 °F (36.5 °C)  Pulse:  [] 95  Resp:  [17-18] 18  SpO2:  [94 %-99 %] 99 %  BP: ()/(0-69) 115/68     Patient Vitals for the past 72 hrs (Last 3 readings):   Weight   11/09/23 0750 88.3 kg (194 lb 10.7 oz)   11/08/23 1842 92 kg (202 lb 13.2 oz)   11/08/23 1252 94.3 kg (208 lb)     Body mass index is 24.33 kg/m².      Intake/Output Summary (Last 24 hours) at 11/9/2023 1129  Last data filed at 11/9/2023 0630  Gross per 24 hour   Intake 300 ml   Output 3150 ml   Net -2850 ml        Hemodynamic Parameters:            Physical Exam  Constitutional:       Appearance: Normal appearance.   HENT:      Head: Normocephalic and atraumatic.   Neck:      Vascular: JVD present.      Comments: +JVD  Cardiovascular:      Rate and Rhythm: Normal rate and regular rhythm.      Pulses: Normal pulses.      Heart sounds: Normal heart sounds.      Comments: LVAD hum smooth  Pulmonary:      Effort: Pulmonary effort is normal.      Breath sounds: Normal breath sounds.   Chest:      Comments: AICD covered with dressing.  Abdominal:      General: Bowel sounds are normal. There is no distension.      Palpations: Abdomen is soft.      Tenderness: There is no abdominal tenderness.   Musculoskeletal:         General: Normal range of motion.      Cervical back: Normal range of motion and neck supple.      Right lower leg: No edema.   Skin:     General: Skin is warm and dry.      Capillary Refill: Capillary refill takes 2 to 3 seconds.   Neurological:      General: No focal deficit present.      Mental Status: He is alert and oriented to person, place, and time.   Psychiatric:         Mood and Affect: Mood normal.         Behavior: Behavior normal.            Significant Labs:  CBC:  Recent Labs   Lab 11/08/23  1407 11/09/23  0635   WBC 9.21 7.84   RBC 4.29* 4.10*   HGB 9.8* 9.5*   HCT 31.5* 31.0*    324   MCV 73* 76*   MCH 22.8* 23.2*   MCHC 31.1* 30.6*     BNP:  Recent Labs   Lab 11/08/23  1407   *     CMP:  Recent Labs   Lab 11/08/23  1407 11/09/23  0635 11/09/23  0946   * 224* 216*   CALCIUM 9.3 9.9 8.5*   ALBUMIN 3.4* 3.0*  --    PROT 8.2 7.4  --     137 137   K 3.3* 5.4* 3.3*   CO2 27 22* 26   CL 96 103 102   BUN 18 20 15   CREATININE 1.5* 1.5* 1.4   ALKPHOS 127 116  --    ALT 11 10  --    AST 30 31  --    BILITOT 1.7* 1.1*  --       Coagulation:   Recent Labs   Lab 11/08/23  1420 11/09/23  0635   INR 2.9* 3.1*   APTT  --  33.3*     LDH:  Recent Labs   Lab 11/09/23  0635   *      Microbiology:  Microbiology Results (last 7 days)       Procedure Component Value Units Date/Time    Blood culture x two cultures. Draw prior to antibiotics. [[id number]] Collected: 11/08/23 1407    Order Status: Completed Specimen: Blood from Peripheral, Lower Arm, Left Updated: 11/08/23 2145     Blood Culture, Routine No Growth to date    Narrative:      Aerobic and anaerobic    Blood culture x two cultures. Draw prior to antibiotics. [[id number]] Collected: 11/08/23 1408    Order Status: Completed Specimen: Blood from Peripheral, Antecubital, Left Updated: 11/08/23 2145     Blood Culture, Routine No Growth to date    Narrative:      Aerobic and anaerobic            I have reviewed all pertinent labs within the past 24 hours.    Estimated Creatinine Clearance: 85.5 mL/min (based on SCr of 1.4 mg/dL).    Diagnostic Results:  I have reviewed all pertinent imaging results/findings within the past 24 hours.    Assessment and Plan:     Kevan Queen is a 38 y.o. male on LVAD presenting with c/o defibrillator coming out of his chest. He stated that this started a few months ago. He also c/o that his picc line looks longer that the previous ones he has had. The picc line is functioning and there is no irritation around it. He uses it daily for his milrinone infusion. He states that his defibrillator has been coming out of his chest for around a week now. He denied chest trauma, fever, nausea, vomiting or diarrhea. Breathing is at baseline and comfortable at rest. Denies orthopnea or PND or LVAD alarms.          * ICD (implantable cardioverter-defibrillator) malfunction  Pts [employer] AICD is falling out of L chest wall.  Unclear reason for dehiscence of ICD site  BCx's pending. Continue Zosyn and Vanc  Will consult both ID and EP. ICD extraction planned for this afternoon.     Seizure-like activity  Continue keppra    LVAD (left ventricular assist device) present  Procedure: Device Interrogation Including  analysis of device parameters  Current Settings: Ventricular Assist Device  INR therapeutic continue coumadin.   Review of device function is stable/unstable stable        11/9/2023     8:00 AM 11/9/2023    12:30 AM 11/8/2023     8:30 PM 11/8/2023     6:42 PM 9/11/2023     5:00 PM 9/11/2023    12:00 PM 9/11/2023     8:00 AM   TXP LVAD INTERROGATIONS   Type HeartMate3 HeartMate3 HeartMate3 HeartMate3 HeartMate3 HeartMate3 HeartMate3   Flow 3.6 4.3 3.9 4.3 4.4 4.3 4.3   Speed 5100 5100 5100 5100 5100 5100 5100   PI 7.6 5 6.5 5.1 4.1 4.9 4.8   Power (Serna) 3.7 3.5 3.6 3.6 3.7 3.8 3.7   LSL 4700 4700 4700 4700   4700   Pulsatility Pulse Intermittent pulse Pulse Pulse          Type 2 diabetes mellitus with hyperglycemia  Sliding scale insulin        Jeff Spencer PA-C  Heart Transplant  [facility] - [facility] Step[facility]

## 2023-11-09 NOTE — TRANSFER OF CARE
"Anesthesia Transfer of Care Note    Patient: Kevan Queen    Procedure(s) Performed: Procedure(s) (LRB):  EXTRACTION, ELECTRODE LEAD (Left)  Transesophageal echo (GUILLERMO) intra-procedure log documentation  REVISION, SKIN POCKET, FOR CARDIOVERTER-DEFIBRILLATOR    Patient location: PACU    Anesthesia Type: general    Transport from OR: Transported from OR on room air with adequate spontaneous ventilation    Post pain: adequate analgesia    Post assessment: no apparent anesthetic complications and tolerated procedure well    Post vital signs: stable    Level of consciousness: awake    Nausea/Vomiting: no nausea/vomiting    Complications: none    Transfer of care protocol was followed      Last vitals: Visit Vitals  BP 90/85   Pulse 96   Temp 36.8 °C (98.3 °F) (Oral)   Resp 18   Ht 6' 3" (1.905 m)   Wt 88 kg (194 lb)   SpO2 98%   BMI 24.25 kg/m²     "

## 2023-11-09 NOTE — ANESTHESIA PROCEDURE NOTES
Intubation    Date/Time: 11/9/2023 12:43 PM    Performed by: Letty Spencer CRNA  Authorized by: Kadeem Alex MD    Intubation:     Induction:  Intravenous    Intubated:  Postinduction    Mask Ventilation:  Easy with oral airway    Attempts:  1    Attempted By:  Student    Method of Intubation:  Video laryngoscopy    Blade:  Webster 3    Laryngeal View Grade: Grade I - full view of cords      Difficult Airway Encountered?: No      Complications:  None    Airway Device:  Oral endotracheal tube    Airway Device Size:  7.5    Style/Cuff Inflation:  Cuffed    Inflation Amount (mL):  8    Tube secured:  21    Secured at:  The teeth    Placement Verified By:  Capnometry    Complicating Factors:  None    Findings Post-Intubation:  BS equal bilateral

## 2023-11-09 NOTE — ANESTHESIA PROCEDURE NOTES
Arterial    Diagnosis: cardiomyopathy    Patient location during procedure: done in OR  Procedure start time: 11/9/2023 12:31 PM  Procedure end time: 11/9/2023 12:36 PM    Staffing  Authorizing Provider: Kadeem Alex MD  Performing Provider: Kadeem Alex MD    Staffing  Performed by: Kadeem Alex MD  Authorized by: Kadeem Alex MD    Anesthesiologist was present at the time of the procedure.    Preanesthetic Checklist  Completed: patient identified, IV checked, risks and benefits discussed, surgical consent, monitors and equipment checked, pre-op evaluation, timeout performed and anesthesia consent givenArterial  Skin Prep: chlorhexidine gluconate and isopropyl alcohol  Local Infiltration: lidocaine  Orientation: right  Location: radial    Catheter Size: 20 G  Catheter placement by Ultrasound guidance. Heme positive aspiration all ports.   Vessel Caliber: small, patent, compressibility normal  Needle advanced into vessel with real time Ultrasound guidance.  Sterile sheath used.Insertion Attempts: 1  Assessment  Dressing: secured with tape and tegaderm  Patient: Tolerated well

## 2023-11-09 NOTE — ED NOTES
Telemetry Verification   Patient placed on Telemetry Box  Verified with War Room  Box # [id number]   Monitor Tech Adali   Rate 115   Rhythm LVAD pt

## 2023-11-09 NOTE — NURSING
Notified Katrina ROSEN. K 3.3, Mag 1.6, and old PICC line site, Katrina ROSEN. Came at bedside assess the old PICC line site, will order to remove it.

## 2023-11-09 NOTE — PLAN OF CARE
Recommendations    1) 310D + 105AA + IV Lipids to provide 1974 kcals, 105g PRO, GIR: 2.44   2) ADAT per MD   3) RD Following    Goals: Meet % een/epn by next RD f/u  Nutrition Goal Status: new  Communication of RD Recs: other (comment) (POC)

## 2023-11-09 NOTE — ANESTHESIA PREPROCEDURE EVALUATION
11/09/2023  Keavn Queen is a 38 y.o., male.      Pre-op Assessment    I have reviewed the Patient Summary Reports.       I have reviewed the Medications.     Review of Systems  Anesthesia Hx:   History of prior surgery of interest to airway management or planning:            Denies Personal Hx of Anesthesia complications.                    Social:  Recreational Drugs       Cardiovascular:    Pacemaker        CHF        S/p VAD with NL RV Fxn                         Pulmonary:      Shortness of breath                  Renal/:  Chronic Renal Disease                Neurological:   CVA   Headaches Seizures                                Endocrine:  Diabetes           Psych:  Psychiatric History                  Physical Exam  General: Well nourished    Airway:  Mallampati: III / II  Mouth Opening: Normal  TM Distance: Normal  Tongue: Normal  Neck ROM: Normal ROM    Chest/Lungs:  Normal Respiratory Rate    Heart:  Rate: Normal        Anesthesia Plan  Type of Anesthesia, risks & benefits discussed:    Anesthesia Type: Gen ETT  Intra-op Monitoring Plan: Standard ASA Monitors and Art Line  Post Op Pain Control Plan: multimodal analgesia  Induction:  IV  Airway Plan: Video  Informed Consent: Informed consent signed with the Patient and all parties understand the risks and agree with anesthesia plan.  All questions answered.   ASA Score: 4  Day of Surgery Review of History & Physical: H&P Update referred to the surgeon/provider.  Anesthesia Plan Notes: NPO confirmed.  NL RV Fxn on Echo      Ready For Surgery From Anesthesia Perspective.     .

## 2023-11-09 NOTE — ASSESSMENT & PLAN NOTE
Pts [employer] AICD is falling out of L chest wall.  Unclear reason for dehiscence of ICD site  BCx's pending. Continue Zosytyrone and Rodger  Will consult both ID and EP. ICD extraction planned for this afternoon.

## 2023-11-09 NOTE — PROGRESS NOTES
Heart Transplant  attempted to reach pt by phone in order to complete assessment, however pt was not available.   Transplant Social Workers will follow.

## 2023-11-09 NOTE — CONSULTS
"Infectious Disease Note      Mr. Queen is a 38M with PMH of NICM EF 10-15%, ICD placement in Jan 2022, HM3 LVAD placement on 6/29/23 with DLESI 2/2 MSSA in Sept 2023, previous MV repair, DM2, epilepsy, and polysubstance abuse, presents 1 week after ICD began eroding through chest with some purulence. Infectious disease consulted for "ICD is fully exposed and hanging out. Pt on broad spectrum abx".     Patient is currently in the OR getting ICD removed. Per chart review, he has been afebrile, on room air, normal WBC, no growth on 11/8 blood cultures.     Recommendations  Start vancomycin and cefepime  Follow up blood and intraoperative cultures      Full note to follow tomorrow.       Jo Ann Willson  Infectious Disease Fellow, PGY4  "

## 2023-11-09 NOTE — NURSING TRANSFER
Nursing Transfer Note      11/8/2023   1842PM    Reason patient is being transferred: ICD    Transfer To: room 319    Transfer via stretcher    Transfer with cardiac monitoring    Transported by transportation team    Telemetry: Box Number [id number]  Order for Tele Monitor? Yes    4eyes on Skin: yes    Medicines sent: pt on milrinone gtt and vanco gtt when arrival to room 319    Any special needs or follow-up needed: LVAD    Patient belongings transferred with patient: Yes    Chart send with patient: Yes    Notified: Katrina ROSEN.    Patient reassessed at: 1842 11/08/2023     Upon arrival to floor: cardiac monitor applied, patient oriented to room, call bell in reach, and bed in lowest position

## 2023-11-09 NOTE — CONSULTS
[facility]  GUILLERMO Consult      Kevan Queen  YOB: 1985  Medical Record Number:  [id number]  Attending Physician:  Josh Ryan, *   Date of Admission: 11/8/2023       Hospital Day:  1  Current Principal Problem:  ICD (implantable cardioverter-defibrillator) malfunction      History     Cc: ICD pocket erosion    HPI  Mr. Queen is a 38-year-old M w/ medical history significant for nonischemic cardiomyopathy, recent LVEF 10-15%, s/p implantation of a HeartMate III LVAD on 6/29/2023, implantation of a primary prevention [employer] single-chamber ICD on 1/20/2022, mitral valve [id number] repair via an Elyse stitch, type II diabetes mellitus, epilepsy, a history of polysubstance abuse, and overweight BMI. He reports that approximately one week ago his device began eroding through his anterior left chest pocket location, with the generator visible with edyta purulence.     Today, denies any complaints. Awaiting device extraction for today.       Anticoagulant/antiplatelets: warfarin 3mg  ECG: S. tachy     Dysphagia or odynophagia:  no  Liver Disease, esophageal disease, or known varices:  no  Upper GI Bleeding: no  Snoring:  no  Sleep Apnea:  no  Prior neck surgery or radiation:  no  History of anesthetic difficulties:  no  Family history of anesthetic difficulties:  no  Last oral intake: last pm   Able to move neck in all directions: yes  Platelet count: 324  INR: 3.1        Medications - Outpatient  Prior to Admission medications    Medication Sig Start Date End Date Taking? Authorizing Provider   levETIRAcetam (KEPPRA) 1000 MG tablet TAKE 1 TABLET(1000 MG) BY MOUTH TWICE DAILY 10/25/23  Yes Natalya Villatoro MD   warfarin (COUMADIN) 3 MG tablet Take 1.5-2 tablets (4.5-6 mg total) by mouth Daily. Take 1.5 tablets (4.5mg) orally daily, except 2 tablets (6mg) on Wednesdays and Saturdays 9/12/23  Yes Josh Ryan MD   acetaminophen (TYLENOL) 325 MG  tablet Take 2 tablets (650 mg total) by mouth every 6 (six) hours as needed for Pain. 9/11/23   Marlo Marques MD   aspirin (ECOTRIN) 81 MG EC tablet Take 1 tablet (81 mg total) by mouth once daily. 8/9/23   Josh Ryan MD   atorvastatin (LIPITOR) 40 MG tablet Take 1 tablet (40 mg total) by mouth once daily. 9/12/23 9/11/24  Marlo Marques MD   blood sugar diagnostic Strp Use to test blood glucose 4 (four) times daily. 12/16/22   Natalya Villatoro MD   blood-glucose meter Misc Use as instructed 5/27/22   Luca Lopez Jr., MD   busPIRone (BUSPAR) 10 MG tablet Take 10 mg by mouth 2 (two) times daily. 4/25/23   Provider, Historical   cefadroxil (DURICEF) 500 MG Cap Take 1 capsule (500 mg total) by mouth every 12 (twelve) hours. 9/15/23 9/14/24  Laura Baldwin MD   enoxaparin (LOVENOX) 40 mg/0.4 mL Syrg Inject 0.4 mLs (40 mg total) into the skin every 12 (twelve) hours. 9/20/23   Josh Ryan MD   furosemide (LASIX) 80 MG tablet Take 1 tablet (80 mg total) by mouth once daily. 10/25/23 10/24/24  Natayla Villatoro MD   insulin aspart U-100 (NOVOLOG) 100 unit/mL (3 mL) InPn pen Inject 6 Units into the skin 3 (three) times daily with meals. Plus Sliding Scale: 151-200: +1, 201-250: +2, 251-300: +3, 301-350: +4 and call MD 9/11/23   Marlo Marques MD   insulin detemir U-100, Levemir, 100 unit/mL (3 mL) SubQ InPn pen Inject 8 Units into the skin 2 (two) times daily. 9/11/23 9/10/24  Marlo Marques MD   lancets 33 gauge Misc Use to test blood glucose 4 (four) times daily. 5/27/22   Luca Lopez Jr., MD   magnesium oxide (MAG-OX) 400 mg (241.3 mg magnesium) tablet Take 1 tablet (400 mg total) by mouth once daily. 10/25/23   Natalya Villatoro MD   milrinone 20mg/100ml D5W, 200mcg/ml, (PRIMACOR) 20 mg/100 mL (200 mcg/mL) infusion Inject 23.6 mcg/min into the vein continuous. 9/11/23   Marlo Marques MD   mirtazapine (REMERON) 15 MG tablet Take 1 tablet (15 mg total) by mouth nightly.  "10/25/23   Natalya Villatoro MD   pantoprazole (PROTONIX) 40 MG tablet Take 1 tablet (40 mg total) by mouth once daily. 9/12/23 9/11/24  Marlo Marques MD   pen needle, diabetic 32 gauge x 5/32" Ndle Use to inject insulin 5 (five) times daily. 8/8/23   Josh Ryan MD   polyethylene glycol (GLYCOLAX) 17 gram PwPk Take 17 g by mouth once daily. 9/12/23   Marlo Marques MD   senna (SENOKOT) 8.6 mg tablet Take 1 tablet by mouth once daily. 9/12/23   Marlo Marques MD   spironolactone (ALDACTONE) 25 MG tablet Take 1 tablet (25 mg total) by mouth once daily. 10/9/23 10/8/24  Josh Ryan MD   valsartan (DIOVAN) 40 MG tablet Take 1 tablet (40 mg total) by mouth 2 (two) times daily. 4/19/23 4/18/24  Camilla Billingsley MD   digoxin (LANOXIN) 125 mcg tablet Take 1 tablet (0.125 mg total) by mouth once daily. 4/26/22 5/27/22  Jeff Spencer PA-C   EScitalopram oxalate (LEXAPRO) 5 MG Tab Take 1 tablet (5 mg total) by mouth once daily. 5/27/22 7/27/22  Luca Lopez Jr., MD   insulin (LANTUS SOLOSTAR U-100 INSULIN) glargine 100 units/mL (3mL) SubQ pen Inject 10 Units into the skin every evening.  Patient not taking: Reported on 5/24/2022 5/20/22 5/27/22  Bautista Lynch III, MD   metOLazone (ZAROXOLYN) 2.5 MG tablet Take 2.5 mg by mouth every other day. 11/25/21 4/25/22  Provider, Historical   metoprolol succinate (TOPROL-XL) 25 MG 24 hr tablet TAKE 1 TABLET(25 MG) BY MOUTH EVERY DAY 5/19/21 4/25/22  Luca Lopez Jr., MD         Medications - Current  Scheduled Meds:   aspirin  81 mg Oral Daily    atorvastatin  40 mg Oral Daily    furosemide  80 mg Oral Daily    levETIRAcetam  1,000 mg Oral BID    magnesium oxide  400 mg Oral Daily    mirtazapine  15 mg Oral Nightly    pantoprazole  40 mg Oral Daily    spironolactone  25 mg Oral Daily    warfarin  4 mg Oral Daily     Continuous Infusions:   milrinone 20mg/100ml D5W (200mcg/ml) 0.25 mcg/kg/min (11/09/23 0454)     PRN " Meds:.acetaminophen, dextrose 10%, dextrose 10%, glucagon (human recombinant), glucose, glucose, insulin aspart U-100, sodium chloride 0.9%      Allergies  Review of patient's allergies indicates:   Allergen Reactions    Bumex [bumetanide] Hives    Torsemide Hives         Past Medical History  Past Medical History:   Diagnosis Date    Acute respiratory failure with hypoxia 7/22/2023    Arthritis     Awareness alteration, transient 9/1/2022    Cardiomyopathy     CHF (congestive heart failure) 10/01/2020    COVID-19 6/3/2023    Diabetes mellitus     Dilated cardiomyopathy 10/26/2020    Drug abuse 10/2020    Headache 4/19/2023    Hyperglycemia 12/16/2022    Hyperosmolar hyperglycemic state (HHS) 5/25/2022    ICD (implantable cardioverter-defibrillator) in place 10/26/2020    Left ventricular assist device (LVAD) complication, initial encounter 6/5/2023    Muscle cramping 6/15/2022    Renal disorder     SOB (shortness of breath) 6/13/2022    Tingling in extremities 7/13/2022         Past Surgical History  Past Surgical History:   Procedure Laterality Date    APPLICATION OF WOUND VACUUM-ASSISTED CLOSURE DEVICE N/A 6/30/2022    Procedure: APPLICATION, WOUND VAC;  Surgeon: Luis F Paige MD;  Location: [facility] OR 2ND FLR;  Service: Cardiovascular;  Laterality: N/A;  50 x 5 cm    CARDIAC DEFIBRILLATOR PLACEMENT      IMPLANTATION OF RIGHT VENTRICULAR ASSIST DEVICE (RVAD) N/A 6/29/2022    Procedure: INSERTION, RVAD;  Surgeon: Luis F Paige MD;  Location: [facility] OR 2ND FLR;  Service: Cardiovascular;  Laterality: N/A;    INSERTION OF GRAFT TO PERICARDIUM Right 6/30/2022    Procedure: INSERTION-RIGHT VENTRICULAR ASSIST DEVICE;  Surgeon: Luis F Paige MD;  Location: [facility] OR 2ND FLR;  Service: Cardiovascular;  Laterality: Right;    IRRIGATION OF MEDIASTINUM  6/30/2022    Procedure: IRRIGATION, MEDIASTINUM;  Surgeon: Luis F Paige MD;  Location: [facility] OR 2ND FLR;  Service: Cardiovascular;;    LEFT VENTRICULAR ASSIST DEVICE Left  2022    Procedure: INSERTION-LEFT VENTRICULAR ASSIST DEVICE;  Surgeon: Luis F Paige MD;  Location: [facility] OR [facility] FLR;  Service: Cardiovascular;  Laterality: Left;    LEFT VENTRICULAR ASSIST DEVICE N/A 2022    Procedure: INSERTION-LEFT VENTRICULAR ASSIST DEVICE;  Surgeon: Luis F Paige MD;  Location: [facility] OR [facility] FLR;  Service: Cardiovascular;  Laterality: N/A;    RIGHT HEART CATHETERIZATION Right 2022    Procedure: INSERTION, CATHETER, RIGHT HEART;  Surgeon: Luca Lopez Jr., MD;  Location: [facility] CATH LAB;  Service: Cardiology;  Laterality: Right;    RIGHT HEART CATHETERIZATION Right 2022    Procedure: INSERTION, CATHETER, RIGHT HEART;  Surgeon: Josh Pulido MD;  Location: [facility] CATH LAB;  Service: Cardiology;  Laterality: Right;    RIGHT HEART CATHETERIZATION Right 2022    Procedure: INSERTION, CATHETER, RIGHT HEART;  Surgeon: Dalia Crum MD;  Location: [facility] CATH LAB;  Service: Cardiology;  Laterality: Right;    RIGHT HEART CATHETERIZATION Right 10/31/2022    Procedure: INSERTION, CATHETER, RIGHT HEART;  Surgeon: Dalia Crum MD;  Location: [facility] CATH LAB;  Service: Cardiology;  Laterality: Right;    STERNAL WOUND CLOSURE N/A 2022    Procedure: CLOSURE, WOUND, STERNUM;  Surgeon: Luis F Paige MD;  [facility];  Service: Cardiovascular;  Laterality: N/A;         Social History  Social History     Socioeconomic History    Marital status: Legally    Tobacco Use    Smoking status: Former     Current packs/day: 0.00     Average packs/day: 0.5 packs/day for 16.6 years (8.3 ttl pk-yrs)     Types: Cigarettes     Start date: 10/1/2004     Quit date: 2021     Years since quittin.5    Smokeless tobacco: Never   Substance and Sexual Activity    Alcohol use: Not Currently    Drug use: Not Currently     Types: Marijuana, MDMA (Ecstacy)    Sexual activity: Yes     Partners: Female     Birth control/protection: None         ROS  10 point ROS  "performed and negative except as stated in HPI     Physical Examination         Vital Signs  Vitals  Temp: 97.5 °F (36.4 °C)  Temp Source: Tympanic  Pulse: 104  Heart Rate Source: Monitor  Resp: 17  SpO2: 99 %  BP: (!) 70/0  MAP (mmHg): 79  BP Location: Right arm  BP Method: Doppler  Patient Position: Lying          24 Hour VS Range    Temp:  [97.5 °F (36.4 °C)-98.2 °F (36.8 °C)]   Pulse:  []   Resp:  [17-18]   BP: (68-99)/(0-69)   SpO2:  [94 %-99 %]     Intake/Output Summary (Last 24 hours) at 11/9/2023 0856  Last data filed at 11/9/2023 0630  Gross per 24 hour   Intake 300 ml   Output 3150 ml   Net -2850 ml         Physical Exam:   Constitutional: no acute distress  HEENT: NCAT, EOMI, no scleral icterus  Cardiovascular: Regular rhythm, tachycardia  Pulmonary: Normal respiratory effort   Abdomen: nontender, non-distended   Neuro: alert and oriented, no focal deficits  Extremities: warm, no edema   MSK: no deformities  Integument: intact, no rashes       Data       Recent Labs   Lab 11/08/23  1407 11/09/23  0635   WBC 9.21 7.84   HGB 9.8* 9.5*   HCT 31.5* 31.0*    324        Recent Labs   Lab 11/08/23  1420 11/09/23  0635   INR 2.9* 3.1*        Recent Labs   Lab 11/08/23  1407 11/09/23  0635    137   K 3.3* 5.4*   CL 96 103   CO2 27 22*   BUN 18 20   CREATININE 1.5* 1.5*   ANIONGAP 14 12   CALCIUM 9.3 9.9        Recent Labs   Lab 11/08/23  1407 11/09/23  0635   PROT 8.2 7.4   ALBUMIN 3.4* 3.0*   BILITOT 1.7* 1.1*   ALKPHOS 127 116   AST 30 31   ALT 11 10        No results for input(s): "TROPONINI" in the last 168 hours.     BNP (pg/mL)   Date Value   11/08/2023 300 (H)   08/31/2023 924 (H)   08/07/2023 106 (H)   08/04/2023 104 (H)   08/02/2023 82       Recent Labs   Lab 11/08/23  1407 11/08/23  1408   LABBLOO No Growth to date No Growth to date            Assessment & Plan   ICD (implantable cardioverter-defibrillator) infection       We will proceed with GUILLERMO in preparation for device extraction. "   -No absolute contraindications of esophageal stricture, tumor, perforation, laceration,or diverticulum and/or active GI bleed  -The risks, benefits & alternatives of the procedure were explained to the patient.   -The risks of transesophageal echo include but are not limited to:  Dental trauma, esophageal trauma/perforation, bleeding, laryngospasm/brochospasm, aspiration, sore throat/hoarseness, & dislodgement of the endotracheal tube/nasogastric tube (where applicable).    -The risks of moderate sedation include hypotension, respiratory depression, arrhythmias, bronchospasm, & death.    -Informed consent was obtained. The patient is agreeable to proceed with the procedure and all questions and concerns addressed    Geneva Ovalles MD  [facility]   Cardiovascular Disease PGY-V

## 2023-11-09 NOTE — PROGRESS NOTES
11/09/2023  Enrique Awan Jr    Current provider:  Josh Ryan, *    Device interrogation:      11/9/2023     8:00 AM 11/9/2023    12:30 AM 11/8/2023     8:30 PM 11/8/2023     6:42 PM 9/11/2023     5:00 PM 9/11/2023    12:00 PM 9/11/2023     8:00 AM   TXP LVAD INTERROGATIONS   Type HeartMate3 HeartMate3 HeartMate3 HeartMate3 HeartMate3 HeartMate3 HeartMate3   Flow 3.6 4.3 3.9 4.3 4.4 4.3 4.3   Speed 5100 5100 5100 5100 5100 5100 5100   PI 7.6 5 6.5 5.1 4.1 4.9 4.8   Power (Serna) 3.7 3.5 3.6 3.6 3.7 3.8 3.7   LSL 4700 4700 4700 4700   4700   Pulsatility Pulse Intermittent pulse Pulse Pulse             Rounded on Kevan Queen to ensure all mechanical assist device settings (IABP or VAD) were appropriate and all parameters were within limits.  I was able to ensure all back up equipment was present, the staff had no issues, and the Perfusion Department daily rounding was complete.  Pt for ICD removal today.    For implantable VADs: Interrogation of Ventricular assist device was performed with analysis of device parameters and review of device function. I have personally reviewed the interrogation findings and agree with findings as stated.     In emergency, the nursing units have been notified to contact the perfusion department either by:  Calling [phone] from 630am to 4pm Mon thru Fri, utilizing the On-Call Finder functionality of Epic and searching for Perfusion, or by contacting the hospital  from 4pm to 630am and on weekends and asking to speak with the perfusionist on call.    12:17 PM

## 2023-11-09 NOTE — ASSESSMENT & PLAN NOTE
Procedure: Device Interrogation Including analysis of device parameters  Current Settings: Ventricular Assist Device  INR therapeutic continue coumadin.   Review of device function is stable/unstable stable        11/9/2023     8:00 AM 11/9/2023    12:30 AM 11/8/2023     8:30 PM 11/8/2023     6:42 PM 9/11/2023     5:00 PM 9/11/2023    12:00 PM 9/11/2023     8:00 AM   TXP LVAD INTERROGATIONS   Type HeartMate3 HeartMate3 HeartMate3 HeartMate3 HeartMate3 HeartMate3 HeartMate3   Flow 3.6 4.3 3.9 4.3 4.4 4.3 4.3   Speed 5100 5100 5100 5100 5100 5100 5100   PI 7.6 5 6.5 5.1 4.1 4.9 4.8   Power (Serna) 3.7 3.5 3.6 3.6 3.7 3.8 3.7   LSL 4700 4700 4700 4700   4700   Pulsatility Pulse Intermittent pulse Pulse Pulse

## 2023-11-09 NOTE — NURSING
Nurses Note -- 4 Eyes      11/8/2023   7:11 PM      Skin assessed during: Transfer      [] No Altered Skin Integrity Present    []Prevention Measures Documented      [x] Yes- Altered Skin Integrity Present or Discovered   [x] LDA Added if Not in Epic (Describe Wound)   [] New Altered Skin Integrity was Present on Admit and Documented in LDA   [] Wound Image Taken    Wound Care Consulted? no    Attending Nurse:  Xiang MODI     Second RN/Staff Member:   Pita Reilly RN

## 2023-11-09 NOTE — ASSESSMENT & PLAN NOTE
ICD ([employer]) protruding from L chest wall. Placed in 10/2020 at [facility]. 38M with NICM EF 10% and HM III and chronic driveline infections. On warfarin and chronic cephalosporins.  -Consult ID. Broad spectrum antibiotics.  -Therapeutic on warfarin  -Device Interrogation. No VT shocks prior  -Will take patient for device/lead extraction + GUILLERMO today. If new ICD for primary prevention is required, please let us know to schedule after 48 hours. Will proceed with initial extraction and management of potential infection for now.

## 2023-11-09 NOTE — PROGRESS NOTES
[facility] - Cardiology Stepdown  Cardiac Electrophysiology  Progress Note    Admission Date: 11/8/2023  Code Status: Full Code   Attending Physician: Josh Ryan, *   Expected Discharge Date:   Principal Problem:ICD (implantable cardioverter-defibrillator) malfunction    Subjective:     Interval:  NAEON. Plan for extraction today.    Past Medical History:   Diagnosis Date    Acute respiratory failure with hypoxia 7/22/2023    Arthritis     Awareness alteration, transient 9/1/2022    Cardiomyopathy     CHF (congestive heart failure) 10/01/2020    COVID-19 6/3/2023    Diabetes mellitus     Dilated cardiomyopathy 10/26/2020    Drug abuse 10/2020    Headache 4/19/2023    Hyperglycemia 12/16/2022    Hyperosmolar hyperglycemic state (HHS) 5/25/2022    ICD (implantable cardioverter-defibrillator) in place 10/26/2020    Left ventricular assist device (LVAD) complication, initial encounter 6/5/2023    Muscle cramping 6/15/2022    Renal disorder     SOB (shortness of breath) 6/13/2022    Tingling in extremities 7/13/2022       Past Surgical History:   Procedure Laterality Date    APPLICATION OF WOUND VACUUM-ASSISTED CLOSURE DEVICE N/A 6/30/2022    Procedure: APPLICATION, WOUND VAC;  Surgeon: Luis F Paige MD;  Location: [facility] OR [address] [place] [address];  Service: Cardiovascular;  Laterality: N/A;  50 x 5 cm    CARDIAC DEFIBRILLATOR PLACEMENT      IMPLANTATION OF RIGHT VENTRICULAR ASSIST DEVICE (RVAD) N/A 6/29/2022    Procedure: INSERTION, RVAD;  Surgeon: Luis F Paige MD;  [facility];  Service: Cardiovascular;  Laterality: N/A;    INSERTION OF GRAFT TO PERICARDIUM Right 6/30/2022    Procedure: INSERTION-RIGHT VENTRICULAR ASSIST DEVICE;  Surgeon: Luis F Paige MD;  [facility];  Service: Cardiovascular;  Laterality: Right;    IRRIGATION OF MEDIASTINUM  6/30/2022    Procedure: IRRIGATION, MEDIASTINUM;  Surgeon: Luis F Paige MD;  [facility];  Service: Cardiovascular;;    LEFT VENTRICULAR  ASSIST DEVICE Left 6/23/2022    Procedure: INSERTION-LEFT VENTRICULAR ASSIST DEVICE;  Surgeon: Luis F Paige MD;  [facility];  Service: Cardiovascular;  Laterality: Left;    LEFT VENTRICULAR ASSIST DEVICE N/A 6/29/2022    Procedure: INSERTION-LEFT VENTRICULAR ASSIST DEVICE;  Surgeon: Luis F Paige MD;  [facility];  Service: Cardiovascular;  Laterality: N/A;    RIGHT HEART CATHETERIZATION Right 4/8/2022    Procedure: INSERTION, CATHETER, RIGHT HEART;  Surgeon: Luca Lopez Jr., MD;  Location: [facility] CATH LAB;  Service: Cardiology;  Laterality: Right;    RIGHT HEART CATHETERIZATION Right 4/19/2022    Procedure: INSERTION, CATHETER, RIGHT HEART;  Surgeon: Josh Pulido MD;  Location: [facility] CATH LAB;  Service: Cardiology;  Laterality: Right;    RIGHT HEART CATHETERIZATION Right 7/21/2022    Procedure: INSERTION, CATHETER, RIGHT HEART;  Surgeon: Dalia Crum MD;  Location: [facility] CATH LAB;  Service: Cardiology;  Laterality: Right;    RIGHT HEART CATHETERIZATION Right 10/31/2022    Procedure: INSERTION, CATHETER, RIGHT HEART;  Surgeon: Dalia Crum MD;  Location: [facility] CATH LAB;  Service: Cardiology;  Laterality: Right;    STERNAL WOUND CLOSURE N/A 6/30/2022    Procedure: CLOSURE, WOUND, STERNUM;  Surgeon: Luis F Paige MD;  [facility];  Service: Cardiovascular;  Laterality: N/A;       Review of patient's allergies indicates:   Allergen Reactions    Bumex [bumetanide] Hives    Torsemide Hives       No current facility-administered medications on file prior to encounter.     Current Outpatient Medications on File Prior to Encounter   Medication Sig    levETIRAcetam (KEPPRA) 1000 MG tablet TAKE 1 TABLET(1000 MG) BY MOUTH TWICE DAILY    warfarin (COUMADIN) 3 MG tablet Take 1.5-2 tablets (4.5-6 mg total) by mouth Daily. Take 1.5 tablets (4.5mg) orally daily, except 2 tablets (6mg) on Wednesdays and Saturdays    acetaminophen (TYLENOL) 325 MG tablet Take 2 tablets  "(650 mg total) by mouth every 6 (six) hours as needed for Pain.    aspirin (ECOTRIN) 81 MG EC tablet Take 1 tablet (81 mg total) by mouth once daily.    atorvastatin (LIPITOR) 40 MG tablet Take 1 tablet (40 mg total) by mouth once daily.    blood sugar diagnostic Strp Use to test blood glucose 4 (four) times daily.    blood-glucose meter Misc Use as instructed    busPIRone (BUSPAR) 10 MG tablet Take 10 mg by mouth 2 (two) times daily.    cefadroxil (DURICEF) 500 MG Cap Take 1 capsule (500 mg total) by mouth every 12 (twelve) hours.    enoxaparin (LOVENOX) 40 mg/0.4 mL Syrg Inject 0.4 mLs (40 mg total) into the skin every 12 (twelve) hours.    furosemide (LASIX) 80 MG tablet Take 1 tablet (80 mg total) by mouth once daily.    insulin aspart U-100 (NOVOLOG) 100 unit/mL (3 mL) InPn pen Inject 6 Units into the skin 3 (three) times daily with meals. Plus Sliding Scale: 151-200: +1, 201-250: +2, 251-300: +3, 301-350: +4 and call MD    insulin detemir U-100, Levemir, 100 unit/mL (3 mL) SubQ InPn pen Inject 8 Units into the skin 2 (two) times daily.    lancets 33 gauge Misc Use to test blood glucose 4 (four) times daily.    magnesium oxide (MAG-OX) 400 mg (241.3 mg magnesium) tablet Take 1 tablet (400 mg total) by mouth once daily.    milrinone 20mg/100ml D5W, 200mcg/ml, (PRIMACOR) 20 mg/100 mL (200 mcg/mL) infusion Inject 23.6 mcg/min into the vein continuous.    mirtazapine (REMERON) 15 MG tablet Take 1 tablet (15 mg total) by mouth nightly.    pantoprazole (PROTONIX) 40 MG tablet Take 1 tablet (40 mg total) by mouth once daily.    pen needle, diabetic 32 gauge x 5/32" Ndle Use to inject insulin 5 (five) times daily.    polyethylene glycol (GLYCOLAX) 17 gram PwPk Take 17 g by mouth once daily.    senna (SENOKOT) 8.6 mg tablet Take 1 tablet by mouth once daily.    spironolactone (ALDACTONE) 25 MG tablet Take 1 tablet (25 mg total) by mouth once daily.    valsartan (DIOVAN) 40 MG tablet Take 1 tablet (40 mg total) by mouth " 2 (two) times daily.    [DISCONTINUED] digoxin (LANOXIN) 125 mcg tablet Take 1 tablet (0.125 mg total) by mouth once daily.    [DISCONTINUED] EScitalopram oxalate (LEXAPRO) 5 MG Tab Take 1 tablet (5 mg total) by mouth once daily.    [DISCONTINUED] insulin (LANTUS SOLOSTAR U-100 INSULIN) glargine 100 units/mL (3mL) SubQ pen Inject 10 Units into the skin every evening. (Patient not taking: Reported on 2022)    [DISCONTINUED] metOLazone (ZAROXOLYN) 2.5 MG tablet Take 2.5 mg by mouth every other day.    [DISCONTINUED] metoprolol succinate (TOPROL-XL) 25 MG 24 hr tablet TAKE 1 TABLET(25 MG) BY MOUTH EVERY DAY     Family History       Problem Relation (Age of Onset)    Diverticulosis Brother    Heart attack Maternal Grandmother, Maternal Grandfather    Heart failure Father          Tobacco Use    Smoking status: Former     Current packs/day: 0.00     Average packs/day: 0.5 packs/day for 16.6 years (8.3 ttl pk-yrs)     Types: Cigarettes     Start date: 10/1/2004     Quit date: 2021     Years since quittin.5    Smokeless tobacco: Never   Substance and Sexual Activity    Alcohol use: Not Currently    Drug use: Not Currently     Types: Marijuana, MDMA (Ecstacy)    Sexual activity: Yes     Partners: Female     Birth control/protection: None     Review of Systems   Constitutional: Negative for chills, fever, malaise/fatigue and night sweats.   HENT:  Negative for congestion, nosebleeds, sore throat, stridor and tinnitus.    Eyes:  Negative for blurred vision, discharge, double vision, pain, vision loss in left eye, vision loss in right eye, visual disturbance and visual halos.   Cardiovascular:  Negative for chest pain, dyspnea on exertion, leg swelling, near-syncope, orthopnea, palpitations and syncope.        Tenderness over ICD site   Respiratory:  Negative for cough, hemoptysis, shortness of breath, snoring, sputum production and wheezing.    Endocrine: Negative for cold intolerance, heat intolerance and  polydipsia.   Hematologic/Lymphatic: Negative for adenopathy and bleeding problem. Does not bruise/bleed easily.   Skin:  Negative for color change, dry skin, flushing, poor wound healing and suspicious lesions.   Musculoskeletal:  Negative for arthritis, back pain, gout, joint pain and joint swelling.   Gastrointestinal:  Negative for bloating, abdominal pain, constipation and diarrhea.   Genitourinary:  Negative for dysuria, frequency and hematuria.   Neurological:  Negative for dizziness, focal weakness, headaches, light-headedness, loss of balance, numbness and weakness.   Psychiatric/Behavioral:  Negative for altered mental status, hallucinations and hypervigilance. The patient is not nervous/anxious.      Objective:     Vital Signs (Most Recent):  Temp: 97.5 °F (36.4 °C) (11/09/23 0517)  Pulse: 104 (11/09/23 0517)  Resp: 17 (11/09/23 0517)  BP: (!) 70/0 (11/09/23 0517)  SpO2: 99 % (11/09/23 0517) Vital Signs (24h Range):  Temp:  [97.5 °F (36.4 °C)-98.2 °F (36.8 °C)] 97.5 °F (36.4 °C)  Pulse:  [] 104  Resp:  [17-18] 17  SpO2:  [94 %-99 %] 99 %  BP: (68-99)/(0-69) 70/0       Weight: 92 kg (202 lb 13.2 oz)  Body mass index is 25.35 kg/m².    SpO2: 99 %        Physical Exam  Constitutional:       General: He is not in acute distress.     Appearance: Normal appearance. He is normal weight. He is not toxic-appearing.   HENT:      Head: Normocephalic and atraumatic.   Eyes:      General:         Right eye: No discharge.         Left eye: No discharge.      Extraocular Movements: Extraocular movements intact.      Conjunctiva/sclera: Conjunctivae normal.      Pupils: Pupils are equal, round, and reactive to light.   Cardiovascular:      Rate and Rhythm: Normal rate and regular rhythm.      Pulses: Normal pulses.      Heart sounds: Normal heart sounds. No murmur heard.     No friction rub.      Comments: Half of ICD protruding from L upper chest wall.   Pulmonary:      Effort: Pulmonary effort is normal. No  respiratory distress.      Breath sounds: Normal breath sounds. No wheezing or rales.   Abdominal:      General: Abdomen is flat. Bowel sounds are normal. There is no distension.      Palpations: Abdomen is soft.      Tenderness: There is no abdominal tenderness. There is no guarding.   Musculoskeletal:         General: No swelling, tenderness or deformity. Normal range of motion.      Cervical back: Normal range of motion and neck supple. No rigidity.      Right lower leg: No edema.      Left lower leg: No edema.   Skin:     General: Skin is warm and dry.      Capillary Refill: Capillary refill takes less than 2 seconds.      Coloration: Skin is not jaundiced or pale.      Findings: No bruising.   Neurological:      General: No focal deficit present.      Mental Status: He is alert and oriented to person, place, and time. Mental status is at baseline.   Psychiatric:         Mood and Affect: Mood normal.         Thought Content: Thought content normal.         Judgment: Judgment normal.          Assessment and Plan:     ICD (implantable cardioverter-defibrillator) infection  ICD ([employer]) protruding from L chest wall. Placed in 10/2020 at [facility]. 38M with NICM EF 10% and HM III and chronic driveline infections. On warfarin and chronic cephalosporins.  -Consult ID. Broad spectrum antibiotics.  -Therapeutic on warfarin  -Device Interrogation. No VT shocks prior  -Will take patient for device/lead extraction + GUILLERMO today. If new ICD for primary prevention is required, please let us know to schedule after 48 hours. Will proceed with initial extraction and management of potential infection for now.           Won Rowland MD  Cardiac Electrophysiology  [facility] - Cardiology Stepdown

## 2023-11-09 NOTE — HPI
"Mr. Queen is a 38M with PMH of NICM EF 10-15%, ICD placement in Jan 2022, HM3 LVAD placement on 6/29/23 with DLESI 2/2 MSSA in Sept 2023, previous MV repair, DM2, epilepsy, and polysubstance abuse, presents 1 week after ICD began eroding through chest with some purulence. Infectious disease consulted for "ICD is fully exposed and hanging out. Pt on broad spectrum abx".     EP team has evaluated patient is planning for complete device removal. He received one dose each of vancomycin and zosyn in the ER. He does have a RUE PICC for home milrinone. Patient had a recent admission in September 2023 for MSSA bacteremia with DLESI and L empyema, was treated with ancef. He also had COVID at that time.         "

## 2023-11-09 NOTE — CARE UPDATE
"RAPID RESPONSE NURSE CHART REVIEW        Chart Reviewed: 11/09/2023, 3:04 PM    MRN: [id number]  Bed: [facility] POOL ROOM/[facility] E*    Dx: ICD (implantable cardioverter-defibrillator) malfunction    Kevan Queen has a past medical history of Acute respiratory failure with hypoxia, Arthritis, Awareness alteration, transient, Cardiomyopathy, CHF (congestive heart failure), COVID-19, Diabetes mellitus, Dilated cardiomyopathy, Drug abuse, Headache, Hyperglycemia, Hyperosmolar hyperglycemic state (HHS), ICD (implantable cardioverter-defibrillator) in place, Left ventricular assist device (LVAD) complication, initial encounter, Muscle cramping, Renal disorder, SOB (shortness of breath), and Tingling in extremities.    Last VS: /81   Pulse 96   Temp 98.3 °F (36.8 °C) (Oral)   Resp 18   Ht 6' 3" (1.905 m)   Wt 88 kg (194 lb)   SpO2 99%   BMI 24.25 kg/m²     24H Vital Sign Range:  Temp:  [97.5 °F (36.4 °C)-98.3 °F (36.8 °C)]   Pulse:  []   Resp:  [17-18]   BP: ()/(0-81)   SpO2:  [94 %-99 %]     Level of Consciousness (AVPU): alert    Recent Labs     11/08/23  1407 11/09/23  0635   WBC 9.21 7.84   HGB 9.8* 9.5*   HCT 31.5* 31.0*    324       Recent Labs     11/08/23  1407 11/09/23  0635 11/09/23  0946    137 137   K 3.3* 5.4* 3.3*   CL 96 103 102   CO2 27 22* 26   BUN 18 20 15   CREATININE 1.5* 1.5* 1.4   * 224* 216*   MG 1.6 2.0  --        OXYGEN: N/A     MEWS score: 1    Rounding completed with charge RIAN Evans.  No concerns verbalized at this time. Instructed to call [phone] for further concerns or assistance.    Noreen Bartholomew RN       "

## 2023-11-09 NOTE — NURSING TRANSFER
Nursing Transfer Note      11/9/2023   12:17 PM    Reason patient is being transferred: GUILLERMO and ICD removal    Transfer To:  EP lab    Transfer via stretcher    Transfer with cardiac monitoring    Transported by transportation team    Telemetry: Box Number [id number]  Order for Tele Monitor? Yes    Medicines sent: on milrinone gtt    Any special needs or follow-up needed: LVAD    Patient belongings transferred with patient: Yes    Chart send with patient: Yes

## 2023-11-10 LAB
ALBUMIN SERPL BCP-MCNC: 2.8 G/DL (ref 3.5–5.2)
ALP SERPL-CCNC: 109 U/L (ref 55–135)
ALT SERPL W/O P-5'-P-CCNC: 7 U/L (ref 10–44)
ANION GAP SERPL CALC-SCNC: 9 MMOL/L (ref 8–16)
APTT PPP: 27.5 SEC (ref 21–32)
AST SERPL-CCNC: 21 U/L (ref 10–40)
BASOPHILS # BLD AUTO: 0.01 K/UL (ref 0–0.2)
BASOPHILS NFR BLD: 0.1 % (ref 0–1.9)
BILIRUB SERPL-MCNC: 1.2 MG/DL (ref 0.1–1)
BUN SERPL-MCNC: 19 MG/DL (ref 6–20)
CALCIUM SERPL-MCNC: 8.9 MG/DL (ref 8.7–10.5)
CHLORIDE SERPL-SCNC: 99 MMOL/L (ref 95–110)
CO2 SERPL-SCNC: 27 MMOL/L (ref 23–29)
CREAT SERPL-MCNC: 1.4 MG/DL (ref 0.5–1.4)
CRP SERPL-MCNC: 5.8 MG/L (ref 0–8.2)
DIFFERENTIAL METHOD: ABNORMAL
EOSINOPHIL # BLD AUTO: 0 K/UL (ref 0–0.5)
EOSINOPHIL NFR BLD: 0 % (ref 0–8)
ERYTHROCYTE [DISTWIDTH] IN BLOOD BY AUTOMATED COUNT: 19.8 % (ref 11.5–14.5)
EST. GFR  (NO RACE VARIABLE): >60 ML/MIN/1.73 M^2
GLUCOSE SERPL-MCNC: 315 MG/DL (ref 70–110)
HCT VFR BLD AUTO: 23.9 % (ref 40–54)
HGB BLD-MCNC: 6.6 G/DL (ref 14–18)
HGB BLD-MCNC: 6.9 G/DL (ref 14–18)
HGB BLD-MCNC: 7.1 G/DL (ref 14–18)
HGB BLD-MCNC: 7.6 G/DL (ref 14–18)
IMM GRANULOCYTES # BLD AUTO: 0.03 K/UL (ref 0–0.04)
IMM GRANULOCYTES NFR BLD AUTO: 0.3 % (ref 0–0.5)
INR PPP: 2.7 (ref 0.8–1.2)
LDH SERPL L TO P-CCNC: 278 U/L (ref 110–260)
LYMPHOCYTES # BLD AUTO: 0.9 K/UL (ref 1–4.8)
LYMPHOCYTES NFR BLD: 9.1 % (ref 18–48)
MAGNESIUM SERPL-MCNC: 1.8 MG/DL (ref 1.6–2.6)
MAGNESIUM SERPL-MCNC: 2.1 MG/DL (ref 1.6–2.6)
MCH RBC QN AUTO: 22.9 PG (ref 27–31)
MCHC RBC AUTO-ENTMCNC: 31.8 G/DL (ref 32–36)
MCV RBC AUTO: 72 FL (ref 82–98)
MONOCYTES # BLD AUTO: 0.7 K/UL (ref 0.3–1)
MONOCYTES NFR BLD: 6.8 % (ref 4–15)
NEUTROPHILS # BLD AUTO: 8.6 K/UL (ref 1.8–7.7)
NEUTROPHILS NFR BLD: 83.7 % (ref 38–73)
NRBC BLD-RTO: 0 /100 WBC
PLATELET # BLD AUTO: 302 K/UL (ref 150–450)
PMV BLD AUTO: 9.9 FL (ref 9.2–12.9)
POCT GLUCOSE: 316 MG/DL (ref 70–110)
POCT GLUCOSE: 333 MG/DL (ref 70–110)
POCT GLUCOSE: 341 MG/DL (ref 70–110)
POCT GLUCOSE: 408 MG/DL (ref 70–110)
POCT GLUCOSE: 422 MG/DL (ref 70–110)
POCT GLUCOSE: 428 MG/DL (ref 70–110)
POTASSIUM SERPL-SCNC: 4.5 MMOL/L (ref 3.5–5.1)
PROT SERPL-MCNC: 6.7 G/DL (ref 6–8.4)
PROTHROMBIN TIME: 27.1 SEC (ref 9–12.5)
RBC # BLD AUTO: 3.32 M/UL (ref 4.6–6.2)
SODIUM SERPL-SCNC: 135 MMOL/L (ref 136–145)
VANCOMYCIN TROUGH SERPL-MCNC: 8.3 UG/ML (ref 10–22)
WBC # BLD AUTO: 10.32 K/UL (ref 3.9–12.7)

## 2023-11-10 PROCEDURE — 85610 PROTHROMBIN TIME: CPT | Performed by: STUDENT IN AN ORGANIZED HEALTH CARE EDUCATION/TRAINING PROGRAM

## 2023-11-10 PROCEDURE — 93750 INTERROGATION VAD IN PERSON: CPT | Mod: ,,, | Performed by: INTERNAL MEDICINE

## 2023-11-10 PROCEDURE — 63600175 PHARM REV CODE 636 W HCPCS: Performed by: STUDENT IN AN ORGANIZED HEALTH CARE EDUCATION/TRAINING PROGRAM

## 2023-11-10 PROCEDURE — 93750 PR INTERROGATE VENT ASSIST DEV, IN PERSON, W PHYSICIAN ANALYSIS: ICD-10-PCS | Mod: ,,, | Performed by: INTERNAL MEDICINE

## 2023-11-10 PROCEDURE — 99233 PR SUBSEQUENT HOSPITAL CARE,LEVL III: ICD-10-PCS | Mod: ,,, | Performed by: STUDENT IN AN ORGANIZED HEALTH CARE EDUCATION/TRAINING PROGRAM

## 2023-11-10 PROCEDURE — P9016 RBC LEUKOCYTES REDUCED: HCPCS | Performed by: STUDENT IN AN ORGANIZED HEALTH CARE EDUCATION/TRAINING PROGRAM

## 2023-11-10 PROCEDURE — 85018 HEMOGLOBIN: CPT | Mod: 91 | Performed by: STUDENT IN AN ORGANIZED HEALTH CARE EDUCATION/TRAINING PROGRAM

## 2023-11-10 PROCEDURE — 80202 ASSAY OF VANCOMYCIN: CPT | Performed by: INTERNAL MEDICINE

## 2023-11-10 PROCEDURE — 20000000 HC ICU ROOM

## 2023-11-10 PROCEDURE — 63600175 PHARM REV CODE 636 W HCPCS: Performed by: NURSE PRACTITIONER

## 2023-11-10 PROCEDURE — 99233 SBSQ HOSP IP/OBS HIGH 50: CPT | Mod: ,,, | Performed by: STUDENT IN AN ORGANIZED HEALTH CARE EDUCATION/TRAINING PROGRAM

## 2023-11-10 PROCEDURE — 87040 BLOOD CULTURE FOR BACTERIA: CPT | Performed by: STUDENT IN AN ORGANIZED HEALTH CARE EDUCATION/TRAINING PROGRAM

## 2023-11-10 PROCEDURE — 25000003 PHARM REV CODE 250: Performed by: NURSE PRACTITIONER

## 2023-11-10 PROCEDURE — 25000003 PHARM REV CODE 250: Performed by: STUDENT IN AN ORGANIZED HEALTH CARE EDUCATION/TRAINING PROGRAM

## 2023-11-10 PROCEDURE — 86140 C-REACTIVE PROTEIN: CPT | Performed by: STUDENT IN AN ORGANIZED HEALTH CARE EDUCATION/TRAINING PROGRAM

## 2023-11-10 PROCEDURE — 83735 ASSAY OF MAGNESIUM: CPT | Performed by: STUDENT IN AN ORGANIZED HEALTH CARE EDUCATION/TRAINING PROGRAM

## 2023-11-10 PROCEDURE — 27000248 HC VAD-ADDITIONAL DAY

## 2023-11-10 PROCEDURE — 85025 COMPLETE CBC W/AUTO DIFF WBC: CPT | Performed by: STUDENT IN AN ORGANIZED HEALTH CARE EDUCATION/TRAINING PROGRAM

## 2023-11-10 PROCEDURE — 99292 PR CRITICAL CARE, ADDL 30 MIN: ICD-10-PCS | Mod: ,,, | Performed by: INTERNAL MEDICINE

## 2023-11-10 PROCEDURE — 85730 THROMBOPLASTIN TIME PARTIAL: CPT | Performed by: STUDENT IN AN ORGANIZED HEALTH CARE EDUCATION/TRAINING PROGRAM

## 2023-11-10 PROCEDURE — 99292 CRITICAL CARE ADDL 30 MIN: CPT | Mod: ,,, | Performed by: INTERNAL MEDICINE

## 2023-11-10 PROCEDURE — 25000003 PHARM REV CODE 250: Performed by: INTERNAL MEDICINE

## 2023-11-10 PROCEDURE — 25000003 PHARM REV CODE 250: Performed by: PHYSICIAN ASSISTANT

## 2023-11-10 PROCEDURE — 94761 N-INVAS EAR/PLS OXIMETRY MLT: CPT

## 2023-11-10 PROCEDURE — 63600175 PHARM REV CODE 636 W HCPCS: Performed by: INTERNAL MEDICINE

## 2023-11-10 PROCEDURE — 63600175 PHARM REV CODE 636 W HCPCS: Performed by: PHYSICIAN ASSISTANT

## 2023-11-10 PROCEDURE — 83615 LACTATE (LD) (LDH) ENZYME: CPT | Performed by: STUDENT IN AN ORGANIZED HEALTH CARE EDUCATION/TRAINING PROGRAM

## 2023-11-10 PROCEDURE — 36430 TRANSFUSION BLD/BLD COMPNT: CPT

## 2023-11-10 PROCEDURE — 80053 COMPREHEN METABOLIC PANEL: CPT | Performed by: STUDENT IN AN ORGANIZED HEALTH CARE EDUCATION/TRAINING PROGRAM

## 2023-11-10 PROCEDURE — 99291 PR CRITICAL CARE, E/M 30-74 MINUTES: ICD-10-PCS | Mod: ,,, | Performed by: PHYSICIAN ASSISTANT

## 2023-11-10 PROCEDURE — 99291 CRITICAL CARE FIRST HOUR: CPT | Mod: ,,, | Performed by: PHYSICIAN ASSISTANT

## 2023-11-10 PROCEDURE — 83735 ASSAY OF MAGNESIUM: CPT | Mod: 91 | Performed by: INTERNAL MEDICINE

## 2023-11-10 RX ORDER — GLUCAGON 1 MG
1 KIT INJECTION
Status: DISCONTINUED | OUTPATIENT
Start: 2023-11-10 | End: 2023-11-10

## 2023-11-10 RX ORDER — IBUPROFEN 200 MG
24 TABLET ORAL
Status: DISCONTINUED | OUTPATIENT
Start: 2023-11-10 | End: 2023-11-10

## 2023-11-10 RX ORDER — IBUPROFEN 200 MG
16 TABLET ORAL
Status: DISCONTINUED | OUTPATIENT
Start: 2023-11-10 | End: 2023-11-10

## 2023-11-10 RX ORDER — IBUPROFEN 200 MG
16 TABLET ORAL
Status: DISCONTINUED | OUTPATIENT
Start: 2023-11-10 | End: 2023-11-13

## 2023-11-10 RX ORDER — INSULIN ASPART 100 [IU]/ML
2 INJECTION, SOLUTION INTRAVENOUS; SUBCUTANEOUS ONCE
Status: COMPLETED | OUTPATIENT
Start: 2023-11-10 | End: 2023-11-10

## 2023-11-10 RX ORDER — INSULIN ASPART 100 [IU]/ML
0-5 INJECTION, SOLUTION INTRAVENOUS; SUBCUTANEOUS
Status: DISCONTINUED | OUTPATIENT
Start: 2023-11-10 | End: 2023-11-10

## 2023-11-10 RX ORDER — GLUCAGON 1 MG
1 KIT INJECTION
Status: DISCONTINUED | OUTPATIENT
Start: 2023-11-10 | End: 2023-11-13

## 2023-11-10 RX ORDER — INSULIN ASPART 100 [IU]/ML
0-10 INJECTION, SOLUTION INTRAVENOUS; SUBCUTANEOUS
Status: DISCONTINUED | OUTPATIENT
Start: 2023-11-10 | End: 2023-11-13

## 2023-11-10 RX ORDER — IBUPROFEN 200 MG
24 TABLET ORAL
Status: DISCONTINUED | OUTPATIENT
Start: 2023-11-10 | End: 2023-11-13

## 2023-11-10 RX ORDER — INSULIN ASPART 100 [IU]/ML
1 INJECTION, SOLUTION INTRAVENOUS; SUBCUTANEOUS
Status: DISCONTINUED | OUTPATIENT
Start: 2023-11-10 | End: 2023-11-10

## 2023-11-10 RX ORDER — INSULIN ASPART 100 [IU]/ML
3-6 INJECTION, SOLUTION INTRAVENOUS; SUBCUTANEOUS
Status: DISCONTINUED | OUTPATIENT
Start: 2023-11-11 | End: 2023-11-11

## 2023-11-10 RX ORDER — MAGNESIUM SULFATE HEPTAHYDRATE 40 MG/ML
2 INJECTION, SOLUTION INTRAVENOUS ONCE
Status: COMPLETED | OUTPATIENT
Start: 2023-11-10 | End: 2023-11-10

## 2023-11-10 RX ORDER — INSULIN ASPART 100 [IU]/ML
3 INJECTION, SOLUTION INTRAVENOUS; SUBCUTANEOUS ONCE
Status: DISCONTINUED | OUTPATIENT
Start: 2023-11-10 | End: 2023-11-10

## 2023-11-10 RX ADMIN — CEFEPIME 2 G: 2 INJECTION, POWDER, FOR SOLUTION INTRAVENOUS at 08:11

## 2023-11-10 RX ADMIN — INSULIN ASPART 4 UNITS: 100 INJECTION, SOLUTION INTRAVENOUS; SUBCUTANEOUS at 09:11

## 2023-11-10 RX ADMIN — PANTOPRAZOLE SODIUM 40 MG: 40 TABLET, DELAYED RELEASE ORAL at 09:11

## 2023-11-10 RX ADMIN — MIRTAZAPINE 15 MG: 15 TABLET, FILM COATED ORAL at 09:11

## 2023-11-10 RX ADMIN — INSULIN DETEMIR 4 UNITS: 100 INJECTION, SOLUTION SUBCUTANEOUS at 06:11

## 2023-11-10 RX ADMIN — VANCOMYCIN HYDROCHLORIDE 1000 MG: 1 INJECTION, POWDER, LYOPHILIZED, FOR SOLUTION INTRAVENOUS at 04:11

## 2023-11-10 RX ADMIN — MILRINONE LACTATE IN DEXTROSE 0.25 MCG/KG/MIN: 200 INJECTION, SOLUTION INTRAVENOUS at 08:11

## 2023-11-10 RX ADMIN — INSULIN ASPART 8 UNITS: 100 INJECTION, SOLUTION INTRAVENOUS; SUBCUTANEOUS at 11:11

## 2023-11-10 RX ADMIN — LEVETIRACETAM 1000 MG: 500 TABLET, FILM COATED ORAL at 09:11

## 2023-11-10 RX ADMIN — ACETAMINOPHEN 650 MG: 325 TABLET ORAL at 01:11

## 2023-11-10 RX ADMIN — MAGNESIUM SULFATE 2 G: 2 INJECTION INTRAVENOUS at 06:11

## 2023-11-10 RX ADMIN — PHYTONADIONE 1 MG: 10 INJECTION, EMULSION INTRAMUSCULAR; INTRAVENOUS; SUBCUTANEOUS at 05:11

## 2023-11-10 RX ADMIN — CEFEPIME 2 G: 2 INJECTION, POWDER, FOR SOLUTION INTRAVENOUS at 03:11

## 2023-11-10 RX ADMIN — INSULIN ASPART 4 UNITS: 100 INJECTION, SOLUTION INTRAVENOUS; SUBCUTANEOUS at 11:11

## 2023-11-10 RX ADMIN — INSULIN ASPART 5 UNITS: 100 INJECTION, SOLUTION INTRAVENOUS; SUBCUTANEOUS at 04:11

## 2023-11-10 RX ADMIN — Medication 400 MG: at 09:11

## 2023-11-10 RX ADMIN — CEFEPIME 2 G: 2 INJECTION, POWDER, FOR SOLUTION INTRAVENOUS at 12:11

## 2023-11-10 RX ADMIN — INSULIN ASPART 2 UNITS: 100 INJECTION, SOLUTION INTRAVENOUS; SUBCUTANEOUS at 06:11

## 2023-11-10 RX ADMIN — INSULIN HUMAN 1 UNITS/HR: 1 INJECTION, SOLUTION INTRAVENOUS at 09:11

## 2023-11-10 RX ADMIN — FUROSEMIDE 80 MG: 80 TABLET ORAL at 09:11

## 2023-11-10 RX ADMIN — SPIRONOLACTONE 25 MG: 25 TABLET ORAL at 09:11

## 2023-11-10 RX ADMIN — ATORVASTATIN CALCIUM 40 MG: 40 TABLET, FILM COATED ORAL at 09:11

## 2023-11-10 RX ADMIN — ASPIRIN 81 MG: 81 TABLET, COATED ORAL at 09:11

## 2023-11-10 NOTE — ASSESSMENT & PLAN NOTE
Mr. Queen is a 37yo male who underwent an ICD removal 11/9 secondary to it eroding through the skin.  Post procedure he was noted to have significant bleeding from some bleeding vessels as well as likely confounded with his coagulopathy from his coumadin which he takes for his LVAD. S/p suture ligation of bleeding vessels and application of hemostatic agents with pressure dressing in PACU on 11/9     - dressing changed at bedside, no signs of active bleeding  - recommend wound care consult for consideration of wound vac given no persistent bleeding; dressing changes per primary team who removed the device   - general surgery will sign off at this time, please call with questions

## 2023-11-10 NOTE — CONSULTS
[facility] - Cardiac Intensive Care  General Surgery  Consult Note    Inpatient consult to General Surgery  Consult performed by: Leno Esteves MD  Consult ordered by: Chantal Herndon MD      Subjective:     Chief Complaint/Reason for Admission: Exposed ICD    History of Present Illness: Mr. Queen is a 37yo male with HF (EF 10-15%), s/p LVAD placement with HeartMate III, ICD placement 1/2022, DM2, epilepsy who presented as his ICD began to erode through his skin and was exposed.  He went earlier today for ICD removal by EP and wound vac placement.  Post operatively in PACU it was noted that he was having swelling at his wound vac site as well as the wound vac was having occlusion alarms.  Given these findings general surgery was called to come evaluate the wound vac by cardiology and the hospital medicine service.  On evaluation patient has a significantly swollen left chest.  The wound vac at this time had around 600cc of sanguinous output collected.  Given the swelling and wound vac not working the decision was made to take down the wound vac dressing to better assess.  Immediately found a large hematoma about the size of a baseball that was evacuated from his prior ICD pocket.  Initially it appeared to be muscle oozing and an attempt at placing nu knit was done along with pressure and a new gauze dressing.  However this was unfortunately not satisfactory and the dressing was saturated shortly after secondary to significant continued bleeding.  The dressing was removed and the ICD pocket was further evaluated.  After further evaluation a couple vessels were identified that were injured and had active bleeding resulting in rapid pooling of blood within his ICD pocket.  Discussed with patient and elected to attempt suture ligation of these bleeding vessels with figure of 8 stitches.  The most significant bleeding after this appeared to be controlled and what was left seen appeared to be muscle wall oozing.   Tereso was sprayed in the area and a new nu knit was placed over this.  The pocket was again packed with gauze and a pressure dressing was placed over the area.  The patient was checked on again a couple hours later and the dressing appears to be holding without signs of significant bleeding.  This was discussed with the hospital medicine team as well as cardiology.  The EP attending was at bedside as well during portions of the exam/procedure.    Of note patient is on coumadin for his HeartMate III LVAD and reversing his anticoagulation would pose a significant risk for thrombosis of this and deemed to high of a risk to do.    No current facility-administered medications on file prior to encounter.     Current Outpatient Medications on File Prior to Encounter   Medication Sig    levETIRAcetam (KEPPRA) 1000 MG tablet TAKE 1 TABLET(1000 MG) BY MOUTH TWICE DAILY    warfarin (COUMADIN) 3 MG tablet Take 1.5-2 tablets (4.5-6 mg total) by mouth Daily. Take 1.5 tablets (4.5mg) orally daily, except 2 tablets (6mg) on Wednesdays and Saturdays    acetaminophen (TYLENOL) 325 MG tablet Take 2 tablets (650 mg total) by mouth every 6 (six) hours as needed for Pain.    aspirin (ECOTRIN) 81 MG EC tablet Take 1 tablet (81 mg total) by mouth once daily.    atorvastatin (LIPITOR) 40 MG tablet Take 1 tablet (40 mg total) by mouth once daily.    blood sugar diagnostic Strp Use to test blood glucose 4 (four) times daily.    blood-glucose meter Misc Use as instructed    busPIRone (BUSPAR) 10 MG tablet Take 10 mg by mouth 2 (two) times daily.    cefadroxil (DURICEF) 500 MG Cap Take 1 capsule (500 mg total) by mouth every 12 (twelve) hours.    enoxaparin (LOVENOX) 40 mg/0.4 mL Syrg Inject 0.4 mLs (40 mg total) into the skin every 12 (twelve) hours.    furosemide (LASIX) 80 MG tablet Take 1 tablet (80 mg total) by mouth once daily.    insulin aspart U-100 (NOVOLOG) 100 unit/mL (3 mL) InPn pen Inject 6 Units into the skin 3 (three) times  "daily with meals. Plus Sliding Scale: 151-200: +1, 201-250: +2, 251-300: +3, 301-350: +4 and call MD    insulin detemir U-100, Levemir, 100 unit/mL (3 mL) SubQ InPn pen Inject 8 Units into the skin 2 (two) times daily.    lancets 33 gauge Misc Use to test blood glucose 4 (four) times daily.    magnesium oxide (MAG-OX) 400 mg (241.3 mg magnesium) tablet Take 1 tablet (400 mg total) by mouth once daily.    milrinone 20mg/100ml D5W, 200mcg/ml, (PRIMACOR) 20 mg/100 mL (200 mcg/mL) infusion Inject 23.6 mcg/min into the vein continuous.    mirtazapine (REMERON) 15 MG tablet Take 1 tablet (15 mg total) by mouth nightly.    pantoprazole (PROTONIX) 40 MG tablet Take 1 tablet (40 mg total) by mouth once daily.    pen needle, diabetic 32 gauge x 5/32" Ndle Use to inject insulin 5 (five) times daily.    polyethylene glycol (GLYCOLAX) 17 gram PwPk Take 17 g by mouth once daily.    senna (SENOKOT) 8.6 mg tablet Take 1 tablet by mouth once daily.    spironolactone (ALDACTONE) 25 MG tablet Take 1 tablet (25 mg total) by mouth once daily.    valsartan (DIOVAN) 40 MG tablet Take 1 tablet (40 mg total) by mouth 2 (two) times daily.    [DISCONTINUED] digoxin (LANOXIN) 125 mcg tablet Take 1 tablet (0.125 mg total) by mouth once daily.    [DISCONTINUED] EScitalopram oxalate (LEXAPRO) 5 MG Tab Take 1 tablet (5 mg total) by mouth once daily.    [DISCONTINUED] insulin (LANTUS SOLOSTAR U-100 INSULIN) glargine 100 units/mL (3mL) SubQ pen Inject 10 Units into the skin every evening. (Patient not taking: Reported on 5/24/2022)    [DISCONTINUED] metOLazone (ZAROXOLYN) 2.5 MG tablet Take 2.5 mg by mouth every other day.    [DISCONTINUED] metoprolol succinate (TOPROL-XL) 25 MG 24 hr tablet TAKE 1 TABLET(25 MG) BY MOUTH EVERY DAY       Review of patient's allergies indicates:   Allergen Reactions    Bumex [bumetanide] Hives    Torsemide Hives       Past Medical History:   Diagnosis Date    Acute respiratory failure with hypoxia 7/22/2023    " Arthritis     Awareness alteration, transient 9/1/2022    Cardiomyopathy     CHF (congestive heart failure) 10/01/2020    COVID-19 6/3/2023    Diabetes mellitus     Dilated cardiomyopathy 10/26/2020    Drug abuse 10/2020    Headache 4/19/2023    Hyperglycemia 12/16/2022    Hyperosmolar hyperglycemic state (HHS) 5/25/2022    ICD (implantable cardioverter-defibrillator) in place 10/26/2020    Left ventricular assist device (LVAD) complication, initial encounter 6/5/2023    Muscle cramping 6/15/2022    Renal disorder     SOB (shortness of breath) 6/13/2022    Tingling in extremities 7/13/2022     Past Surgical History:   Procedure Laterality Date    APPLICATION OF WOUND VACUUM-ASSISTED CLOSURE DEVICE N/A 6/30/2022    Procedure: APPLICATION, WOUND VAC;  Surgeon: Luis F Paige MD;  [facility];  Service: Cardiovascular;  Laterality: N/A;  50 x 5 cm    CARDIAC DEFIBRILLATOR PLACEMENT      IMPLANTATION OF RIGHT VENTRICULAR ASSIST DEVICE (RVAD) N/A 6/29/2022    Procedure: INSERTION, RVAD;  Surgeon: Luis F Paige MD;  [facility];  Service: Cardiovascular;  Laterality: N/A;    INSERTION OF GRAFT TO PERICARDIUM Right 6/30/2022    Procedure: INSERTION-RIGHT VENTRICULAR ASSIST DEVICE;  Surgeon: Luis F Paige MD;  Location: [facility] OR 2ND FLR;  Service: Cardiovascular;  Laterality: Right;    IRRIGATION OF MEDIASTINUM  6/30/2022    Procedure: IRRIGATION, MEDIASTINUM;  Surgeon: Luis F Paige MD;  [facility];  Service: Cardiovascular;;    LEFT VENTRICULAR ASSIST DEVICE Left 6/23/2022    Procedure: INSERTION-LEFT VENTRICULAR ASSIST DEVICE;  Surgeon: Luis F Paige MD;  Location: [facility] OR [facility]LR;  Service: Cardiovascular;  Laterality: Left;    LEFT VENTRICULAR ASSIST DEVICE N/A 6/29/2022    Procedure: INSERTION-LEFT VENTRICULAR ASSIST DEVICE;  Surgeon: Luis F Paige MD;  [facility];  Service: Cardiovascular;  Laterality: N/A;    RIGHT HEART CATHETERIZATION Right 4/8/2022     Procedure: INSERTION, CATHETER, RIGHT HEART;  Surgeon: Luca Lopez Jr., MD;  Location: [facility] CATH LAB;  Service: Cardiology;  Laterality: Right;    RIGHT HEART CATHETERIZATION Right 2022    Procedure: INSERTION, CATHETER, RIGHT HEART;  Surgeon: Josh Pulido MD;  Location: [facility] CATH LAB;  Service: Cardiology;  Laterality: Right;    RIGHT HEART CATHETERIZATION Right 2022    Procedure: INSERTION, CATHETER, RIGHT HEART;  Surgeon: Dalia Crum MD;  Location: [facility] CATH LAB;  Service: Cardiology;  Laterality: Right;    RIGHT HEART CATHETERIZATION Right 10/31/2022    Procedure: INSERTION, CATHETER, RIGHT HEART;  Surgeon: Dalia Crum MD;  Location: [facility] CATH LAB;  Service: Cardiology;  Laterality: Right;    STERNAL WOUND CLOSURE N/A 2022    Procedure: CLOSURE, WOUND, STERNUM;  Surgeon: Luis F Paige MD;  Location: [facility] OR [facility] FLR;  Service: Cardiovascular;  Laterality: N/A;     Family History       Problem Relation (Age of Onset)    Diverticulosis Brother    Heart attack Maternal Grandmother, Maternal Grandfather    Heart failure Father          Tobacco Use    Smoking status: Former     Current packs/day: 0.00     Average packs/day: 0.5 packs/day for 16.6 years (8.3 ttl pk-yrs)     Types: Cigarettes     Start date: 10/1/2004     Quit date: 2021     Years since quittin.5    Smokeless tobacco: Never   Substance and Sexual Activity    Alcohol use: Not Currently    Drug use: Not Currently     Types: Marijuana, MDMA (Ecstacy)    Sexual activity: Yes     Partners: Female     Birth control/protection: None     Review of Systems   Constitutional: Negative.    Cardiovascular:  Positive for chest pain (at ICD pocket site).        Bleeding   Gastrointestinal:  Negative for abdominal pain.   Genitourinary: Negative.    Musculoskeletal: Negative.    Neurological: Negative.    Psychiatric/Behavioral: Negative.       Objective:     Vital Signs (Most Recent):  Temp: 98.2 °F (36.8 °C)  (11/09/23 2301)  Pulse: 110 (11/10/23 0001)  Resp: 16 (11/10/23 0001)  BP: (!) 80/10 (11/09/23 2301)  SpO2: 98 % (11/10/23 0001) Vital Signs (24h Range):  Temp:  [97.5 °F (36.4 °C)-98.3 °F (36.8 °C)] 98.2 °F (36.8 °C)  Pulse:  [] 110  Resp:  [10-33] 16  SpO2:  [97 %-100 %] 98 %  BP: ()/(0-91) 80/10  Arterial Line BP: (78-91)/(65-74) 89/74     Weight: 88 kg (194 lb)  Body mass index is 24.25 kg/m².      Intake/Output Summary (Last 24 hours) at 11/10/2023 0102  Last data filed at 11/10/2023 0001  Gross per 24 hour   Intake 1102.95 ml   Output 3925 ml   Net -2822.05 ml       Physical Exam  Constitutional:       Appearance: Normal appearance.   HENT:      Head: Normocephalic and atraumatic.   Eyes:      Extraocular Movements: Extraocular movements intact.   Cardiovascular:      Rate and Rhythm: Tachycardia present.      Comments: Left chest ICD pocket with active bleeding  Pulmonary:      Effort: Pulmonary effort is normal. No respiratory distress.   Abdominal:      General: There is no distension.      Palpations: Abdomen is soft.      Tenderness: There is no abdominal tenderness.   Musculoskeletal:         General: Normal range of motion.   Skin:     General: Skin is warm and dry.   Neurological:      General: No focal deficit present.      Mental Status: He is alert and oriented to person, place, and time.   Psychiatric:         Mood and Affect: Mood normal.         Behavior: Behavior normal.       Significant Labs:  Recent Lab Results  (Last 5 results in the past 24 hours)        11/09/23  2039   11/09/23  1629   11/09/23  1446   11/09/23  1145   11/09/23  0946        Unit Blood Type Code         6200  [P]                6200  [P]                6200  [P]                6200  [P]       Unit Expiration         [id number]  [P]                [id number]  [P]                [id number]  [P]                [id number]  [P]       Unit Blood Type         A POS  [P]                A POS  [P]                 A POS  [P]                A POS  [P]       Anion Gap         9       Baso # 0.02               Basophil % 0.2               BSA     2.16           BUN         15       Calcium         8.5       Chloride         102       CO2         26       CODING SYSTEM         IHUS222  [P]                DNST997  [P]                ONFA088  [P]                ZDBQ850  [P]       Creatinine         1.4       Crossmatch Interpretation         Compatible  [P]                Compatible  [P]                Compatible  [P]                Compatible  [P]       Differential Method Automated               DISPENSE STATUS         CROSSMATCHED  [P]                CROSSMATCHED  [P]                CROSSMATCHED  [P]                CROSSMATCHED  [P]       eGFR         >60.0       Eos # 0.0               Eosinophil % 0.0               Glucose         216       Gran # (ANC) 11.3               Gran % 93.7               Group & Rh         A POS       Hematocrit 27.6               Hemoglobin 8.8               Immature Grans (Abs) 0.05  Comment: Mild elevation in immature granulocytes is non specific and   can be seen in a variety of conditions including stress response,   acute inflammation, trauma and pregnancy. Correlation with other   laboratory and clinical findings is essential.                 Immature Granulocytes 0.4               INDIRECT ARIANA         NEG       Lymph # 0.5               Lymph % 4.5               MCH 23.3               MCHC 31.9               MCV 73               Mono # 0.1               Mono % 1.2               MPV 9.8               nRBC 0               Platelet Count 342               POCT Glucose   248     194         Potassium         3.3       Product Code         O8219Z93  [P]                P2444V20  [P]                M6796N05  [P]                K1675J98  [P]       RBC 3.77               RDW 20.3               Sodium         137       Specimen Outdate         11/12/2023 23:59       UNIT NUMBER          [id number]  [P]                [id number]  [P]                [id number]  [P]                [id number]  [P]       WBC 12.00                                       [P] - Preliminary Result               Significant Diagnostics:  I have reviewed all pertinent imaging results/findings within the past 24 hours.    Assessment/Plan:     Active Diagnoses:    Diagnosis Date Noted POA    PRINCIPAL PROBLEM:  ICD (implantable cardioverter-defibrillator) malfunction [T82.118A] 11/08/2023 Unknown    ICD (implantable cardioverter-defibrillator) infection [T82.7XXA] 07/23/2023 Yes    Seizure-like activity [R56.9] 07/20/2022 Yes    LVAD (left ventricular assist device) present [Z95.811] 06/30/2022 Not Applicable    Type 2 diabetes mellitus with hyperglycemia [E11.65] 05/25/2022 Yes      Problems Resolved During this Admission:     Mr. Queen is a 37yo male who underwent an ICD removal today secondary to it eroding through the skin.  Post procedure he was noted to have significant bleeding from some bleeding vessels as well as likely confounded with his coagulopathy from his coumadin which he takes for his LVAD.    - Performed a bedside evaluation and suture ligation of the bleeding vessels as well as applied hemostatic agents and a pressure dressing to address his significant bleeding  - Would keep dressing in place at this time until it is insured that the bleeding has been controlled  - Will tentatively plan for dressing removal on 11/10 and if hemostasis continues can then plan for wound vac placement    Thank you for your consult. I will follow-up with patient. Please contact us if you have any additional questions.    Leno Esteves MD  General Surgery  [facility] - Cardiac Intensive Care    Procedure Note    11/10/2023  Procedure: Wound exploration, suture ligation of vessels, dressing change    Pre-Op diagnosis: surgical site bleeding    Post-Op diagnosis: surgical site bleeding    EBL: 200cc    Procedure in  detail: After verbal consent was obtained the dressing was removed and the ICD pocket was examined.  Clot was removed and irrigation was performed in the pocket.  There was rapid pooling of blood noted and on inspection multiple vessels were identified which were the source of the bleeding.  Using a 2-0 silk suture these vessels were ligated with a figure of 8 stitch which appeared to control the most significant sites of bleeding.  After further evaluation there was still some slow diffuse oozing which was likely from muscle wall and secondary to his coagulopathic state from his coumadin.  To address this christie and nu knit was placed within the pocket site and it was packed with gauze.  A pressure dressing was also applied overtop of this.    Leno Esteves MD - PGY V  General Surgery

## 2023-11-10 NOTE — PROGRESS NOTES
Noted wound vac alarming blockage at 1759, checked tubing no blockage noted and reset wound vac.   1815 Wound vac alarming blockage noted again  volume at 200 ml in chamber  1830 Wound vac chamber filled from to 400 ml changed chamber.  Wound vac still alarming blockage and noted increased hematoma at wound vac site.   1847 Secure chatted Chadwick ROSEN instructed to call hospitalist.  1905  Katrina ROSEN at bedside  Increased  bloody drainage at site, patient  increased c/o pain. Bleeding noted out of wound vac site. Pressure applied to wound vac site, bleeding continued, Lee Ann ROSEN on phone with EP fellow Chadwick ROSEN.   1915 Katrina ROSEN called general surgery resident. This RN continue to hold pressure to site.   1930 General surgery  Keegan ROSEN and Linn ROSEN at bedside removed wound vac, noted continued bleeding. General surgery at asked for supplies gauze and continue to remove clots from wound, patient complain of pain. Oleg ROSEN placed order for fentanyl , administered fentanyl to patient.   1950 General surgery applied pressure dressing, nursing staff to hold pressure to site to stop bleeding, Katrina ROSEN asked if patient care would be escalated to ICU, instructed not now will to hold pressure and once stopped return patient to CSU..   2010 Continued pressure applied to site, General surgery at bedside informed of continued bleeding after holding pressure to site. Patient awake and alert talking to staff. General surgery removed pressure dressing continued bleeding, This RN called Santosh ROSEN to inform General surgery at bedside and patient with continued bleeding.   Keegan ROSEN discussed  plan of care with Wing ROSEN.   2020 General surgery and  Carolyn ROSEN at bedside to attempt bedside hemostasis, Patient with complains of pain all prescribed given.   2045 Doctor Kishor at bedside medication given to patient for pain and comfort.   2115 Bedside hemostasis completed by general surgery, patient resting comfortable VSS. Pressure dressing  applied no bleeding noted.  Care escalated to ICU report called.

## 2023-11-10 NOTE — SUBJECTIVE & OBJECTIVE
Interval History: yesterday got ICD extracted in afternoon. Developed hematoma which prompted wound vac removal and required surgery consult to achieve hemostasis. This AM slowly was oozing through the dressing. Hemostasis achieved again. Site currently covered w/ dressing.  Hemodynamically stable but H/H dropping since admit.     Continuous Infusions:   milrinone 20mg/100ml D5W (200mcg/ml) 0.25 mcg/kg/min (11/10/23 1305)     Scheduled Meds:   aspirin  81 mg Oral Daily    atorvastatin  40 mg Oral Daily    ceFEPime (MAXIPIME) IVPB  2 g Intravenous Q8H    furosemide  80 mg Oral Daily    levETIRAcetam  1,000 mg Oral BID    magnesium oxide  400 mg Oral Daily    mirtazapine  15 mg Oral Nightly    pantoprazole  40 mg Oral Daily    spironolactone  25 mg Oral Daily    vancomycin (VANCOCIN) IV (PEDS and ADULTS)  1,000 mg Intravenous Q12H     PRN Meds:acetaminophen, dextrose 10%, dextrose 10%, glucagon (human recombinant), glucose, glucose, insulin aspart U-100, sodium chloride 0.9%, Pharmacy to dose Vancomycin consult **AND** vancomycin - pharmacy to dose    Review of patient's allergies indicates:   Allergen Reactions    Bumex [bumetanide] Hives    Torsemide Hives     Objective:     Vital Signs (Most Recent):  Temp: 97.4 °F (36.3 °C) (11/10/23 1105)  Pulse: (!) 116 (11/10/23 1305)  Resp: (!) 29 (11/10/23 1305)  BP: (!) 78/0 (11/10/23 1105)  SpO2: 96 % (11/10/23 1105) Vital Signs (24h Range):  Temp:  [97.4 °F (36.3 °C)-98.3 °F (36.8 °C)] 97.4 °F (36.3 °C)  Pulse:  [100-148] 116  Resp:  [10-33] 29  SpO2:  [96 %-100 %] 96 %  BP: ()/(0-91) 78/0  Arterial Line BP: ()/(65-88) 97/81     Patient Vitals for the past 72 hrs (Last 3 readings):   Weight   11/10/23 0400 86 kg (189 lb 9.5 oz)   11/09/23 1729 88 kg (194 lb)   11/09/23 1240 88 kg (194 lb)       Body mass index is 23.7 kg/m².      Intake/Output Summary (Last 24 hours) at 11/10/2023 1409  Last data filed at 11/10/2023 1305  Gross per 24 hour   Intake 3023.85 ml    Output 2760 ml   Net 263.85 ml         Hemodynamic Parameters:          Physical Exam  Constitutional:       Appearance: Normal appearance.   HENT:      Head: Normocephalic and atraumatic.   Neck:      Vascular: JVD present.      Comments: +JVD  Cardiovascular:      Rate and Rhythm: Normal rate and regular rhythm.      Pulses: Normal pulses.      Heart sounds: Normal heart sounds.      Comments: LVAD hum smooth  Pulmonary:      Effort: Pulmonary effort is normal.      Breath sounds: Normal breath sounds.   Chest:      Comments: AICD pocket site in L chest wall covered w/ dressing  Abdominal:      General: Bowel sounds are normal. There is no distension.      Palpations: Abdomen is soft.      Tenderness: There is no abdominal tenderness.   Musculoskeletal:         General: Normal range of motion.      Cervical back: Normal range of motion and neck supple.      Right lower leg: No edema.      Left lower leg: No edema.   Skin:     General: Skin is warm and dry.      Capillary Refill: Capillary refill takes 2 to 3 seconds.   Neurological:      General: No focal deficit present.      Mental Status: He is alert and oriented to person, place, and time.   Psychiatric:         Mood and Affect: Mood normal.         Behavior: Behavior normal.            Significant Labs:  CBC:  Recent Labs   Lab 11/09/23  0635 11/09/23  2039 11/10/23  0359 11/10/23  0608 11/10/23  1219   WBC 7.84 12.00 10.32  --   --    RBC 4.10* 3.77* 3.32*  --   --    HGB 9.5* 8.8* 7.6* 6.9* 7.1*   HCT 31.0* 27.6* 23.9*  --   --     342 302  --   --    MCV 76* 73* 72*  --   --    MCH 23.2* 23.3* 22.9*  --   --    MCHC 30.6* 31.9* 31.8*  --   --        BNP:  Recent Labs   Lab 11/08/23  1407   *       CMP:  Recent Labs   Lab 11/08/23  1407 11/09/23 0635 11/09/23  0946 11/10/23  0359   * 224* 216* 315*   CALCIUM 9.3 9.9 8.5* 8.9   ALBUMIN 3.4* 3.0*  --  2.8*   PROT 8.2 7.4  --  6.7    137 137 135*   K 3.3* 5.4* 3.3* 4.5   CO2 27  22* 26 27   CL 96 103 102 99   BUN 18 20 15 19   CREATININE 1.5* 1.5* 1.4 1.4   ALKPHOS 127 116  --  109   ALT 11 10  --  7*   AST 30 31  --  21   BILITOT 1.7* 1.1*  --  1.2*        Coagulation:   Recent Labs   Lab 11/08/23  1420 11/09/23  0635 11/10/23  0359   INR 2.9* 3.1* 2.7*   APTT  --  33.3* 27.5       LDH:  Recent Labs   Lab 11/09/23  0635 11/10/23  0359   * 278*       Microbiology:  Microbiology Results (last 7 days)       Procedure Component Value Units Date/Time    Aerobic culture [[id number]] Collected: 11/09/23 1400    Order Status: Completed Specimen: Incision site from Chest, Left Updated: 11/10/23 1334     Aerobic Bacterial Culture Insufficient incubation, culture in progress    Narrative:      Superficial pocket #2    Blood culture [[id number]] Collected: 11/10/23 0400    Order Status: Completed Specimen: Blood from Peripheral, Wrist, Left Updated: 11/10/23 1315     Blood Culture, Routine No Growth to date    Blood culture [[id number]] Collected: 11/10/23 0403    Order Status: Completed Specimen: Blood from Peripheral, Wrist, Right Updated: 11/10/23 1315     Blood Culture, Routine No Growth to date    Aerobic culture [[id number]] Collected: 11/09/23 1400    Order Status: Completed Specimen: Incision site from Chest, Left Updated: 11/10/23 0725     Aerobic Bacterial Culture No growth    Narrative:      Superficial pocket #1    Aerobic culture [[id number]] Collected: 11/09/23 1400    Order Status: Completed Specimen: Incision site from Chest, Left Updated: 11/10/23 0725     Aerobic Bacterial Culture No growth    Narrative:      Deep pocket #1    Aerobic culture [[id number]] Collected: 11/09/23 1415    Order Status: Completed Specimen: Incision site from Chest, Left Updated: 11/10/23 0725     Aerobic Bacterial Culture No growth    Narrative:      Superior pocket #1    Aerobic culture [[id number]] Collected: 11/09/23 1400    Order Status: Completed Specimen: Incision site from Chest, Left Updated:  11/10/23 0725     Aerobic Bacterial Culture No growth    Narrative:      Deep pocket #2    Aerobic culture [[id number]] Collected: 11/09/23 1430    Order Status: Completed Specimen: Incision site from Chest, Left Updated: 11/10/23 0725     Aerobic Bacterial Culture No growth    Narrative:      Lead tip    Culture, Anaerobe [[id number]] Collected: 11/09/23 1400    Order Status: Completed Specimen: Incision site from Chest, Left Updated: 11/10/23 0608     Anaerobic Culture Culture in progress    Narrative:      Superficial pocket #1    Culture, Anaerobe [[id number]] Collected: 11/09/23 1400    Order Status: Completed Specimen: Incision site from Chest, Left Updated: 11/10/23 0608     Anaerobic Culture Culture in progress    Narrative:      Deep pocket #1    Culture, Anaerobe [[id number]] Collected: 11/09/23 1400    Order Status: Completed Specimen: Incision site from Chest, Left Updated: 11/10/23 0608     Anaerobic Culture Culture in progress    Narrative:      Deep pocket #2    Culture, Anaerobe [[id number]] Collected: 11/09/23 1415    Order Status: Completed Specimen: Incision site from Chest, Left Updated: 11/10/23 0608     Anaerobic Culture Culture in progress    Narrative:      Superior pocket #1    Culture, Anaerobe [[id number]] Collected: 11/09/23 1400    Order Status: Completed Specimen: Incision site from Chest, Left Updated: 11/10/23 0608     Anaerobic Culture Culture in progress    Narrative:      Superficial pocket #2    Culture, Anaerobe [[id number]] Collected: 11/09/23 1430    Order Status: Completed Specimen: Incision site from Chest, Left Updated: 11/10/23 0608     Anaerobic Culture Culture in progress    Narrative:      Lead tip    Blood culture x two cultures. Draw prior to antibiotics. [[id number]] Collected: 11/08/23 1407    Order Status: Completed Specimen: Blood from Peripheral, Lower Arm, Left Updated: 11/09/23 1612     Blood Culture, Routine No Growth to date      No Growth to date     Narrative:      Aerobic and anaerobic    Blood culture x two cultures. Draw prior to antibiotics. [[id number]] Collected: 11/08/23 1408    Order Status: Completed Specimen: Blood from Peripheral, Antecubital, Left Updated: 11/09/23 1612     Blood Culture, Routine No Growth to date      No Growth to date    Narrative:      Aerobic and anaerobic            I have reviewed all pertinent labs within the past 24 hours.    Estimated Creatinine Clearance: 85.5 mL/min (based on SCr of 1.4 mg/dL).    Diagnostic Results:  I have reviewed all pertinent imaging results/findings within the past 24 hours.

## 2023-11-10 NOTE — SUBJECTIVE & OBJECTIVE
Interval History:     Underwent ICD removal yesterday, with bleeding afterwards that was managed by general surgery and primary teams. Patient reports soreness in that area today but otherwise is doing well.     Review of Systems   Constitutional:  Negative for chills and fever.   Respiratory:  Negative for cough and shortness of breath.    Cardiovascular:  Negative for chest pain (soreness), palpitations and leg swelling.   Gastrointestinal:  Negative for abdominal pain, constipation, diarrhea, nausea and vomiting.   Genitourinary:  Negative for dysuria and hematuria.   Musculoskeletal:  Negative for arthralgias and myalgias.   Skin:  Negative for rash.   Neurological:  Negative for weakness and headaches.     Objective:     Vital Signs (Most Recent):  Temp: 97.4 °F (36.3 °C) (11/10/23 1105)  Pulse: (!) 116 (11/10/23 1305)  Resp: (!) 29 (11/10/23 1305)  BP: (!) 78/0 (11/10/23 1105)  SpO2: 96 % (11/10/23 1105) Vital Signs (24h Range):  Temp:  [97.4 °F (36.3 °C)-98.3 °F (36.8 °C)] 97.4 °F (36.3 °C)  Pulse:  [100-148] 116  Resp:  [10-33] 29  SpO2:  [96 %-100 %] 96 %  BP: ()/(0-91) 78/0  Arterial Line BP: ()/(65-88) 97/81     Weight: 86 kg (189 lb 9.5 oz)  Body mass index is 23.7 kg/m².    Estimated Creatinine Clearance: 85.5 mL/min (based on SCr of 1.4 mg/dL).     Physical Exam  Vitals reviewed.   Constitutional:       General: He is not in acute distress.     Appearance: Normal appearance. He is not ill-appearing.   HENT:      Head: Normocephalic and atraumatic.   Eyes:      Extraocular Movements: Extraocular movements intact.      Conjunctiva/sclera: Conjunctivae normal.   Cardiovascular:      Rate and Rhythm: Normal rate and regular rhythm.      Heart sounds: No murmur heard.     Comments: ICD site with dressing that is clean, dry, intact  Pulmonary:      Effort: Pulmonary effort is normal. No respiratory distress.      Breath sounds: Normal breath sounds.   Abdominal:      General: Abdomen is flat.  Bowel sounds are normal.      Palpations: Abdomen is soft.      Tenderness: There is no abdominal tenderness.   Musculoskeletal:      Cervical back: Normal range of motion.   Skin:     General: Skin is warm and dry.   Neurological:      General: No focal deficit present.      Mental Status: He is alert and oriented to person, place, and time.   Psychiatric:         Mood and Affect: Mood normal.         Behavior: Behavior normal.         Thought Content: Thought content normal.          Significant Labs: All pertinent labs within the past 24 hours have been reviewed.  Recent Lab Results  (Last 5 results in the past 24 hours)        11/10/23  1219   11/10/23  1151   11/10/23  1026   11/10/23  0836   11/10/23  0608        Blood Culture, Routine               Hemoglobin 7.1         6.9       Magnesium      2.1           POCT Glucose   333     316                                Significant Imaging: I have reviewed all pertinent imaging results/findings within the past 24 hours.

## 2023-11-10 NOTE — PLAN OF CARE
Bleeding stabilized. Pressure dressing minimally saturated. Surgery team assessed earlier and were happy with hemostasis at the time.    Hgb down some, hemodynamically stable. Continue to trend H/H.    Glucose up this morning (got dexamethasone in OR). Scheduled 50% home doses. Uptitrate from there.    Connor Gillies, DO, PGY-IV  [facility]

## 2023-11-10 NOTE — PROGRESS NOTES
"[facility] - Cardiac Intensive Care  Infectious Disease  Progress Note    Patient Name: Kevan Queen  MRN: [id number]  Admission Date: 11/8/2023  Length of Stay: 2 days  Attending Physician: Josh Ryan, *  Primary Care Provider: Rosio Armendariz FNP    Isolation Status: No active isolations  Assessment/Plan:      ID  ICD (implantable cardioverter-defibrillator) infection  38M with PMH of NICM EF 10-15%, ICD placement in Jan 2022, HM3 LVAD placement on 6/29/23 with DLESI 2/2 MSSA in Sept 2023, previous MV repair, DM2, epilepsy, and polysubstance abuse, presents 1 week after ICD began eroding through chest with some purulence, now s/p removal of device. No growth on 11/8 blood cultures or 11/9 OR cultures.      Recommendations   Continue vancomycin and cefepime   Follow up blood and intraoperative cultures        Anticipated Disposition: TBD     Thank you for your consult. I will follow-up with patient. Please contact us if you have any additional questions.    Jo Ann Willson DO  Infectious Disease  [facility] - Cardiac Intensive Care    Subjective:     Principal Problem:ICD (implantable cardioverter-defibrillator) malfunction    HPI: Mr. Queen is a 38M with PMH of NICM EF 10-15%, ICD placement in Jan 2022, HM3 LVAD placement on 6/29/23 with DLESI 2/2 MSSA in Sept 2023, previous MV repair, DM2, epilepsy, and polysubstance abuse, presents 1 week after ICD began eroding through chest with some purulence. Infectious disease consulted for "ICD is fully exposed and hanging out. Pt on broad spectrum abx".     EP team has evaluated patient is planning for complete device removal. He received one dose each of vancomycin and zosyn in the ER. He does have a RUE PICC for home milrinone. Patient had a recent admission in September 2023 for MSSA bacteremia with DLESI and L empyema, was treated with ancef. He also had COVID at that time.           Interval History:     Underwent ICD removal yesterday, with bleeding " afterwards that was managed by general surgery and primary teams. Patient reports soreness in that area today but otherwise is doing well.     Review of Systems   Constitutional:  Negative for chills and fever.   Respiratory:  Negative for cough and shortness of breath.    Cardiovascular:  Negative for chest pain (soreness), palpitations and leg swelling.   Gastrointestinal:  Negative for abdominal pain, constipation, diarrhea, nausea and vomiting.   Genitourinary:  Negative for dysuria and hematuria.   Musculoskeletal:  Negative for arthralgias and myalgias.   Skin:  Negative for rash.   Neurological:  Negative for weakness and headaches.     Objective:     Vital Signs (Most Recent):  Temp: 97.4 °F (36.3 °C) (11/10/23 1105)  Pulse: (!) 116 (11/10/23 1305)  Resp: (!) 29 (11/10/23 1305)  BP: (!) 78/0 (11/10/23 1105)  SpO2: 96 % (11/10/23 1105) Vital Signs (24h Range):  Temp:  [97.4 °F (36.3 °C)-98.3 °F (36.8 °C)] 97.4 °F (36.3 °C)  Pulse:  [100-148] 116  Resp:  [10-33] 29  SpO2:  [96 %-100 %] 96 %  BP: ()/(0-91) 78/0  Arterial Line BP: ()/(65-88) 97/81     Weight: 86 kg (189 lb 9.5 oz)  Body mass index is 23.7 kg/m².    Estimated Creatinine Clearance: 85.5 mL/min (based on SCr of 1.4 mg/dL).     Physical Exam  Vitals reviewed.   Constitutional:       General: He is not in acute distress.     Appearance: Normal appearance. He is not ill-appearing.   HENT:      Head: Normocephalic and atraumatic.   Eyes:      Extraocular Movements: Extraocular movements intact.      Conjunctiva/sclera: Conjunctivae normal.   Cardiovascular:      Rate and Rhythm: Normal rate and regular rhythm.      Heart sounds: No murmur heard.     Comments: ICD site with dressing that is clean, dry, intact  Pulmonary:      Effort: Pulmonary effort is normal. No respiratory distress.      Breath sounds: Normal breath sounds.   Abdominal:      General: Abdomen is flat. Bowel sounds are normal.      Palpations: Abdomen is soft.       Tenderness: There is no abdominal tenderness.   Musculoskeletal:      Cervical back: Normal range of motion.   Skin:     General: Skin is warm and dry.   Neurological:      General: No focal deficit present.      Mental Status: He is alert and oriented to person, place, and time.   Psychiatric:         Mood and Affect: Mood normal.         Behavior: Behavior normal.         Thought Content: Thought content normal.          Significant Labs: All pertinent labs within the past 24 hours have been reviewed.  Recent Lab Results  (Last 5 results in the past 24 hours)        11/10/23  1219   11/10/23  1151   11/10/23  1026   11/10/23  0836   11/10/23  0608        Blood Culture, Routine               Hemoglobin 7.1         6.9       Magnesium      2.1           POCT Glucose   333     316                                Significant Imaging: I have reviewed all pertinent imaging results/findings within the past 24 hours.

## 2023-11-10 NOTE — SUBJECTIVE & OBJECTIVE
Interval History: Dressing taken down this morning and no signs of active bleeding. Re-dressed.     Medications:  Continuous Infusions:   milrinone 20mg/100ml D5W (200mcg/ml) 0.25 mcg/kg/min (11/10/23 0805)     Scheduled Meds:   aspirin  81 mg Oral Daily    atorvastatin  40 mg Oral Daily    ceFEPime (MAXIPIME) IVPB  2 g Intravenous Q8H    furosemide  80 mg Oral Daily    insulin aspart U-100  1 Units Subcutaneous TIDWM    insulin detemir U-100  4 Units Subcutaneous BID    levETIRAcetam  1,000 mg Oral BID    magnesium oxide  400 mg Oral Daily    magnesium sulfate IVPB  2 g Intravenous Once    mirtazapine  15 mg Oral Nightly    pantoprazole  40 mg Oral Daily    spironolactone  25 mg Oral Daily    vancomycin (VANCOCIN) IV (PEDS and ADULTS)  1,000 mg Intravenous Q12H    warfarin  4 mg Oral Daily     PRN Meds:acetaminophen, dextrose 10%, dextrose 10%, glucagon (human recombinant), glucose, glucose, sodium chloride 0.9%, Pharmacy to dose Vancomycin consult **AND** vancomycin - pharmacy to dose     Review of patient's allergies indicates:   Allergen Reactions    Bumex [bumetanide] Hives    Torsemide Hives     Objective:     Vital Signs (Most Recent):  Temp: 98 °F (36.7 °C) (11/10/23 0705)  Pulse: 107 (11/10/23 0805)  Resp: (!) 25 (11/10/23 0805)  BP: (!) 82/0 (11/10/23 0705)  SpO2: 96 % (11/10/23 0705) Vital Signs (24h Range):  Temp:  [97.5 °F (36.4 °C)-98.3 °F (36.8 °C)] 98 °F (36.7 °C)  Pulse:  [] 107  Resp:  [10-33] 25  SpO2:  [96 %-100 %] 96 %  BP: ()/(0-91) 82/0  Arterial Line BP: (78-95)/(65-81) 87/73     Weight: 86 kg (189 lb 9.5 oz)  Body mass index is 23.7 kg/m².    Intake/Output - Last 3 Shifts         11/08 0700  11/09 0659 11/09 0700  11/10 0659 11/10 0700  11/11 0659    P.O. 300 950     I.V. (mL/kg)  255.6 (3) 106.4 (1.2)    IV Piggyback  802.8 47.2    Total Intake(mL/kg) 300 (3.3) 2008.4 (23.4) 153.7 (1.8)    Urine (mL/kg/hr) 3150 3000 (1.5)     Other  600     Total Output 3150 3600     Net -5415  -1591.6 +153.7                    Physical Exam  Constitutional:       Appearance: Normal appearance.   Cardiovascular:      Rate and Rhythm: Tachycardia present.      Comments: L chest ICD pocket with no active bleeding, clean and dry.  Pulmonary:      Effort: Pulmonary effort is normal.   Skin:     General: Skin is warm and dry.   Neurological:      Mental Status: He is alert.          Significant Labs:  I have reviewed all pertinent lab results within the past 24 hours.  CBC:   Recent Labs   Lab 11/10/23  0359 11/10/23  0608   WBC 10.32  --    RBC 3.32*  --    HGB 7.6* 6.9*   HCT 23.9*  --      --    MCV 72*  --    MCH 22.9*  --    MCHC 31.8*  --      BMP:   Recent Labs   Lab 11/10/23  0359   *   *   K 4.5   CL 99   CO2 27   BUN 19   CREATININE 1.4   CALCIUM 8.9   MG 1.8       Significant Diagnostics:  I have reviewed all pertinent imaging results/findings within the past 24 hours.

## 2023-11-10 NOTE — PROGRESS NOTES
Admit Note     Met with pt to assess needs. Patient is a 38 y.o. single male, admitted for Infection [B99.9], LVAD (left ventricular assist device) present [Z95.811], Cardiac device, implant, or graft infection or inflammation, initial encounter [T82.7XXA], ICD (implantable cardioverter-defibrillator) infection [T82.7XXA], Cellulitis, unspecified cellulitis site [L03.90], Complication due to implantable cardioverter-defibrillator (ICD) [T82.9XXA], Infection involving implantable cardioverter-defibrillator (ICD), initial encounter [T82.7XXA], ICD (implantable cardioverter-defibrillator) malfunction [T82.118A] per medical record.     Pt received LVAD on 6/23/22. The pt's significant other does the dressing changes.      Patient admitted to [facility] on 11/8/2023.  At this time, pt presents aaox4 with pleasant affect. No caregivers present at time of visit.    Household/Family Systems     Patient resides with significant other, and three children at    [address] [place] [address].      2 hours from [facility]    Pt cell: [phone]    Additional contact:   Niharika Wright (significant other, will start work soon and drives) [phone]    Support system includes significant other, mother and children. The pt has seven children, two with the pt's significant other. The pt's other children live with their mothers. Pt reports family/mother care for children in the home when the pt and significant other are not in attendance.      Patients primary caregiver is self with support from significant other when indicated.    Pt's caregivers/family will visit if possible.    Confirmed patient and patients caregivers do have access to reliable transportation. Pt and significant other both drive.    Cognitive Status/Learning     Patient reports reading ability as college and states patient does not have difficulty with reading, writing, hearing, comprehension, learning, and memory.  Pt reports he continues to have the same  difficulty with his eyesight.  Pt reports his eyesight issues do not effect his ability to drive. Patient reports he learns best by multiple methods.   Needed: No.   Highest education level: Attended College/Technical School    Vocation/Disability   .  Working for Income: No  If no, reason not working: medical   Patient used to work as a CNA.  Significant other is currently is working at [employer].   Pt's mother receives monthly income and is able to assist pt financially.   Pt reports he has applied for disability and and a  is assisting with this process.   Pt reports no difficulty at this time covering the cost of monthly bills.     Adherence     Patient reports a high level of adherence to patients health care regimen. Pt reports he is a vegan. Adherence counseling and education provided. Patient verbalizes understanding.    Substance Use    Patient caregiver confirmed the following substance usage.    Tobacco: none, patient denies any use.  Alcohol: none, patient denies any use.  Illicit Drugs/Non-prescribed Medications: no current use. Pt does have a history of drug use. Pt stopped using Marijuana on 11/2021, cocaine on 7/2021 and ecstasy on 9/2020.    Patient states clear understanding of the potential impact of substance use.  Substance abstinence/cessation counseling, education and resources provided and reviewed.     Services Utilizing/ADLS    Infusion Service: Prior to admission, patient utilizing? yes [facility] for home Milrinone.  Pt's significant other Page does the IV dressing changes.   Pt would like to use the same IV company when discharged. [phone], fax [phone]    Home Health: Prior to admission, patient utilizing? no    DME: Prior to admission, no    Pulmonary/Cardiac Rehab: Prior to admission, no    Dialysis:  Prior to admission, no    Transplant Specialty Pharmacy:  Prior to admission, no.    Prior to admission, patient's caregiver reports patient was  independent with ADLS and was driving.  Patient reports patient is able to care for self at this time..  Patient indicates a willingness to care for self once medically cleared to do so.    Insurance/Medications    Insured by   Payer/Plan Subscr  Sex Relation Sub. Ins. ID Effective Group Num   1. MEDICAID - HE* JOSE J DAMICO 1985 Male Self [id number]* 3/1/20 [id number]                                   [address] [address]      Primary Insurance (for UNOS reporting): Public Insurance - Medicaid  Secondary Insurance (for UNOS reporting): None    Patient caregiver reports patient is able to obtain and afford medications at this time and at time of discharge. Pt reports his monthly medications are running about $8.00    Living Will/Healthcare Power of     Patient's caregiver confirmed patient has a LW and/or HCPA.  Pt reports his mother is his HCPA.   provided education regarding LW and HCPA and the completion of forms.    Coping/Mental Health    Per pt, pt is coping adequately with the aid of  family members. Patient indicated mental ailyn difficulties in the past. Pt reports a history of depression, anxiety and anger and is taking Buspar. Pt reports no needs or concerns at this time and hopes to be able to go home soon.      Discharge Planning    At time of discharge, patient plans to return to patient's home under the care of self, significant other and mother.  Patients significant other will transport patient.  Per rounds today, expected discharge date has not been medically determined at this time. Patient verbalizes understanding and is involved in treatment planning and discharge process.    Additional Concerns    Patient is being followed for needs, education, resources, information, emotional support, supportive counseling, and for supportive and skilled discharge plan of care.  providing ongoing psychosocial support, education, resources and d/c planning as needed.  TREY  remains available. Patient denies additional needs and/or concerns at this time. Patient verbalizes understanding and agreement with information reviewed, social work availability, and how to access available resources as needed.

## 2023-11-10 NOTE — NURSING TRANSFER
Nursing Transfer Note      11/9/2023   10:43 PM    Nurse giving handoff: PACU RN  Nurse receiving handoff: RIAN Suggs    Reason patient is being transferred: bleeding r/t lead extraction; VAD pt    Transfer From: PACU    Transfer via stretcher    Transfer with cardiac monitoring, VAD supplies    Transported by PACU RN    Transfer Vital Signs:  Blood Pressure:85/77 (80)  Heart Rate:114  O2:100  Temperature:98.3 Oral  Respirations:21    Telemetry: Rate 114 and Rhythm sinus tach  Continuous cardiac monitoring  Order for Tele Monitor? Yes    4eyes on Skin: yes    Medicines sent: yes    Any special needs or follow-up needed: monitor pressure dressing    Patient belongings transferred with patient: Yes    Chart send with patient: Yes    Notified: mother at bedside    Patient reassessed at: 11/9/23 @ 2200 (date, time)  1  Upon arrival to floor: cardiac monitor applied, patient oriented to room, call bell in reach, and bed in lowest position

## 2023-11-10 NOTE — ASSESSMENT & PLAN NOTE
38M with PMH of NICM EF 10-15%, ICD placement in Jan 2022, HM3 LVAD placement on 6/29/23 with DLESI 2/2 MSSA in Sept 2023, previous MV repair, DM2, epilepsy, and polysubstance abuse, presents 1 week after ICD began eroding through chest with some purulence, now s/p removal of device. No growth on 11/8 blood cultures or 11/9 OR cultures.      Recommendations   Continue vancomycin and cefepime   Follow up blood and intraoperative cultures

## 2023-11-10 NOTE — CARE UPDATE
I was called to the patient's bedside secondary to continued bleeding from his left anterior pocket site location, resulting in clotting and loss of suction of his wound VAC. General surgery was notified and his wound VAC was removed with an attempt to achieve bedside hemostasis with silk sutures, Tereso powder, Nu-Knit, and manual pressure. He was thoroughly irrigated with achievement of hemostasis.    ADALID Leon MD  EP Attending

## 2023-11-10 NOTE — PROGRESS NOTES
11/09/2023  John Alaniz    Current provider:  Josh Ryan, *    Device interrogation:      11/9/2023     4:25 PM 11/9/2023     4:24 PM 11/9/2023    12:33 PM 11/9/2023    12:00 PM 11/9/2023     8:00 AM 11/9/2023    12:30 AM 11/8/2023     8:30 PM   TXP LVAD INTERROGATIONS   Type  HeartMate3 HeartMate3 HeartMate3 HeartMate3 HeartMate3 HeartMate3   Flow 3.8  3.7 3.8 3.6 4.3 3.9   Speed 5100  5100 5100 5100 5100 5100   PI 5.9  6.5 6 7.6 5 6.5   Power (Serna) 3.6  3.6 3.5 3.7 3.5 3.6   LSL    4700 4700 4700 4700   Pulsatility Pulse   Pulse Pulse Intermittent pulse Pulse          Rounded on Kevan Queen to ensure all mechanical assist device settings (IABP or VAD) were appropriate and all parameters were within limits.  I was able to ensure all back up equipment was present, the staff had no issues, and the Perfusion Department daily rounding was complete.      For implantable VADs: Interrogation of Ventricular assist device was performed with analysis of device parameters and review of device function. I have personally reviewed the interrogation findings and agree with findings as stated.     In emergency, the nursing units have been notified to contact the perfusion department either by:  Calling [id number] from 630am to 4pm Mon thru Fri, utilizing the On-Call Finder functionality of Epic and searching for Perfusion, or by contacting the hospital  from 4pm to 630am and on weekends and asking to speak with the perfusionist on call.    6:05 PM

## 2023-11-10 NOTE — PROGRESS NOTES
[facility] - Cardiac Intensive Care  Heart Transplant  Progress Note    Patient Name: Kevan Queen  MRN: [id number]  Admission Date: 11/8/2023  Hospital Length of Stay: 2 days  Attending Physician: Josh Ryan, *  Primary Care Provider: Rosio Armendariz FNP  Principal Problem:ICD (implantable cardioverter-defibrillator) malfunction    Subjective:     Interval History: yesterday got ICD extracted in afternoon. Developed hematoma which prompted wound vac removal and required surgery consult to achieve hemostasis. This AM slowly was oozing through the dressing. Hemostasis achieved again. Site currently covered w/ dressing.  Hemodynamically stable but H/H dropping since admit.     Continuous Infusions:   milrinone 20mg/100ml D5W (200mcg/ml) 0.25 mcg/kg/min (11/10/23 1305)     Scheduled Meds:   aspirin  81 mg Oral Daily    atorvastatin  40 mg Oral Daily    ceFEPime (MAXIPIME) IVPB  2 g Intravenous Q8H    furosemide  80 mg Oral Daily    levETIRAcetam  1,000 mg Oral BID    magnesium oxide  400 mg Oral Daily    mirtazapine  15 mg Oral Nightly    pantoprazole  40 mg Oral Daily    spironolactone  25 mg Oral Daily    vancomycin (VANCOCIN) IV (PEDS and ADULTS)  1,000 mg Intravenous Q12H     PRN Meds:acetaminophen, dextrose 10%, dextrose 10%, glucagon (human recombinant), glucose, glucose, insulin aspart U-100, sodium chloride 0.9%, Pharmacy to dose Vancomycin consult **AND** vancomycin - pharmacy to dose    Review of patient's allergies indicates:   Allergen Reactions    Bumex [bumetanide] Hives    Torsemide Hives     Objective:     Vital Signs (Most Recent):  Temp: 97.4 °F (36.3 °C) (11/10/23 1105)  Pulse: (!) 116 (11/10/23 1305)  Resp: (!) 29 (11/10/23 1305)  BP: (!) 78/0 (11/10/23 1105)  SpO2: 96 % (11/10/23 1105) Vital Signs (24h Range):  Temp:  [97.4 °F (36.3 °C)-98.3 °F (36.8 °C)] 97.4 °F (36.3 °C)  Pulse:  [100-148] 116  Resp:  [10-33] 29  SpO2:  [96 %-100 %] 96 %  BP: ()/(0-91) 78/0  Arterial  Line BP: ()/(65-88) 97/81     Patient Vitals for the past 72 hrs (Last 3 readings):   Weight   11/10/23 0400 86 kg (189 lb 9.5 oz)   11/09/23 1729 88 kg (194 lb)   11/09/23 1240 88 kg (194 lb)       Body mass index is 23.7 kg/m².      Intake/Output Summary (Last 24 hours) at 11/10/2023 1409  Last data filed at 11/10/2023 1305  Gross per 24 hour   Intake 3023.85 ml   Output 2760 ml   Net 263.85 ml         Hemodynamic Parameters:          Physical Exam  Constitutional:       Appearance: Normal appearance.   HENT:      Head: Normocephalic and atraumatic.   Neck:      Vascular: JVD present.      Comments: +JVD  Cardiovascular:      Rate and Rhythm: Normal rate and regular rhythm.      Pulses: Normal pulses.      Heart sounds: Normal heart sounds.      Comments: LVAD hum smooth  Pulmonary:      Effort: Pulmonary effort is normal.      Breath sounds: Normal breath sounds.   Chest:      Comments: AICD pocket site in L chest wall covered w/ dressing  Abdominal:      General: Bowel sounds are normal. There is no distension.      Palpations: Abdomen is soft.      Tenderness: There is no abdominal tenderness.   Musculoskeletal:         General: Normal range of motion.      Cervical back: Normal range of motion and neck supple.      Right lower leg: No edema.      Left lower leg: No edema.   Skin:     General: Skin is warm and dry.      Capillary Refill: Capillary refill takes 2 to 3 seconds.   Neurological:      General: No focal deficit present.      Mental Status: He is alert and oriented to person, place, and time.   Psychiatric:         Mood and Affect: Mood normal.         Behavior: Behavior normal.            Significant Labs:  CBC:  Recent Labs   Lab 11/09/23  0635 11/09/23  2039 11/10/23  0359 11/10/23  0608 11/10/23  1219   WBC 7.84 12.00 10.32  --   --    RBC 4.10* 3.77* 3.32*  --   --    HGB 9.5* 8.8* 7.6* 6.9* 7.1*   HCT 31.0* 27.6* 23.9*  --   --     342 302  --   --    MCV 76* 73* 72*  --   --    MCH  23.2* 23.3* 22.9*  --   --    MCHC 30.6* 31.9* 31.8*  --   --        BNP:  Recent Labs   Lab 11/08/23  1407   *       CMP:  Recent Labs   Lab 11/08/23  1407 11/09/23  0635 11/09/23  0946 11/10/23  0359   * 224* 216* 315*   CALCIUM 9.3 9.9 8.5* 8.9   ALBUMIN 3.4* 3.0*  --  2.8*   PROT 8.2 7.4  --  6.7    137 137 135*   K 3.3* 5.4* 3.3* 4.5   CO2 27 22* 26 27   CL 96 103 102 99   BUN 18 20 15 19   CREATININE 1.5* 1.5* 1.4 1.4   ALKPHOS 127 116  --  109   ALT 11 10  --  7*   AST 30 31  --  21   BILITOT 1.7* 1.1*  --  1.2*        Coagulation:   Recent Labs   Lab 11/08/23  1420 11/09/23  0635 11/10/23  0359   INR 2.9* 3.1* 2.7*   APTT  --  33.3* 27.5       LDH:  Recent Labs   Lab 11/09/23  0635 11/10/23  0359   * 278*       Microbiology:  Microbiology Results (last 7 days)       Procedure Component Value Units Date/Time    Aerobic culture [[id number]] Collected: 11/09/23 1400    Order Status: Completed Specimen: Incision site from Chest, Left Updated: 11/10/23 1334     Aerobic Bacterial Culture Insufficient incubation, culture in progress    Narrative:      Superficial pocket #2    Blood culture [[id number]] Collected: 11/10/23 0400    Order Status: Completed Specimen: Blood from Peripheral, Wrist, Left Updated: 11/10/23 1315     Blood Culture, Routine No Growth to date    Blood culture [[id number]] Collected: 11/10/23 0403    Order Status: Completed Specimen: Blood from Peripheral, Wrist, Right Updated: 11/10/23 1315     Blood Culture, Routine No Growth to date    Aerobic culture [[id number]] Collected: 11/09/23 1400    Order Status: Completed Specimen: Incision site from Chest, Left Updated: 11/10/23 0725     Aerobic Bacterial Culture No growth    Narrative:      Superficial pocket #1    Aerobic culture [[id number]] Collected: 11/09/23 1400    Order Status: Completed Specimen: Incision site from Chest, Left Updated: 11/10/23 0725     Aerobic Bacterial Culture No growth    Narrative:      Deep  pocket #1    Aerobic culture [[id number]] Collected: 11/09/23 1415    Order Status: Completed Specimen: Incision site from Chest, Left Updated: 11/10/23 0725     Aerobic Bacterial Culture No growth    Narrative:      Superior pocket #1    Aerobic culture [[id number]] Collected: 11/09/23 1400    Order Status: Completed Specimen: Incision site from Chest, Left Updated: 11/10/23 0725     Aerobic Bacterial Culture No growth    Narrative:      Deep pocket #2    Aerobic culture [[id number]] Collected: 11/09/23 1430    Order Status: Completed Specimen: Incision site from Chest, Left Updated: 11/10/23 0725     Aerobic Bacterial Culture No growth    Narrative:      Lead tip    Culture, Anaerobe [[id number]] Collected: 11/09/23 1400    Order Status: Completed Specimen: Incision site from Chest, Left Updated: 11/10/23 0608     Anaerobic Culture Culture in progress    Narrative:      Superficial pocket #1    Culture, Anaerobe [[id number]] Collected: 11/09/23 1400    Order Status: Completed Specimen: Incision site from Chest, Left Updated: 11/10/23 0608     Anaerobic Culture Culture in progress    Narrative:      Deep pocket #1    Culture, Anaerobe [[id number]] Collected: 11/09/23 1400    Order Status: Completed Specimen: Incision site from Chest, Left Updated: 11/10/23 0608     Anaerobic Culture Culture in progress    Narrative:      Deep pocket #2    Culture, Anaerobe [[id number]] Collected: 11/09/23 1415    Order Status: Completed Specimen: Incision site from Chest, Left Updated: 11/10/23 0608     Anaerobic Culture Culture in progress    Narrative:      Superior pocket #1    Culture, Anaerobe [[id number]] Collected: 11/09/23 1400    Order Status: Completed Specimen: Incision site from Chest, Left Updated: 11/10/23 0608     Anaerobic Culture Culture in progress    Narrative:      Superficial pocket #2    Culture, Anaerobe [[id number]] Collected: 11/09/23 1430    Order Status: Completed Specimen: Incision site from Chest, Left  Updated: 11/10/23 0608     Anaerobic Culture Culture in progress    Narrative:      Lead tip    Blood culture x two cultures. Draw prior to antibiotics. [[id number]] Collected: 11/08/23 1407    Order Status: Completed Specimen: Blood from Peripheral, Lower Arm, Left Updated: 11/09/23 1612     Blood Culture, Routine No Growth to date      No Growth to date    Narrative:      Aerobic and anaerobic    Blood culture x two cultures. Draw prior to antibiotics. [[id number]] Collected: 11/08/23 1408    Order Status: Completed Specimen: Blood from Peripheral, Antecubital, Left Updated: 11/09/23 1612     Blood Culture, Routine No Growth to date      No Growth to date    Narrative:      Aerobic and anaerobic            I have reviewed all pertinent labs within the past 24 hours.    Estimated Creatinine Clearance: 85.5 mL/min (based on SCr of 1.4 mg/dL).    Diagnostic Results:  I have reviewed all pertinent imaging results/findings within the past 24 hours.    Assessment and Plan:     Kevan Queen is a 38 y.o. male on LVAD presenting with c/o defibrillator coming out of his chest. He stated that this started a few months ago. He also c/o that his picc line looks longer that the previous ones he has had. The picc line is functioning and there is no irritation around it. He uses it daily for his milrinone infusion. He states that his defibrillator has been coming out of his chest for around a week now. He denied chest trauma, fever, nausea, vomiting or diarrhea. Breathing is at baseline and comfortable at rest. Denies orthopnea or PND or LVAD alarms.          * ICD (implantable cardioverter-defibrillator) malfunction  Patient presented with [employer] AICD falling out of L chest wall. Apparently had been this way for 1 week  Unclear reason for dehiscence of ICD site  BCx's pending. Continue cefepime and Vanc  EP extracted his device yesterday, complicated by hematoma so wound vac removed. Hemostasis achieved with general  surgerys help. Will continue to monitor no interventions planned at the moment.     Seizure-like activity  Continue keppra    LVAD (left ventricular assist device) present  Procedure: Device Interrogation Including analysis of device parameters  Current Settings: Ventricular Assist Device  INR therapeutic but will hold given ICD pocket bleed.   Review of device function is stable/unstable stable        11/10/2023     2:05 PM 11/10/2023     1:05 PM 11/10/2023    12:05 PM 11/10/2023    11:05 AM 11/10/2023    10:05 AM 11/10/2023     9:05 AM 11/10/2023     8:05 AM   TXP LVAD INTERROGATIONS   Type HeartMate3 HeartMate3 HeartMate3 HeartMate3 HeartMate3 HeartMate3 HeartMate3   Flow 3.7 3.8 4 3.8 4 3.9 3.8   Speed 5100 5100 5100 5100 5100 5100 5100   PI 6.1 5 4.9 5.8 4.7 5.8 5   Power (Serna) 3.5 3.6 3.8 3.6 3.5 3.6 3.5   LSL 4700 4700 4700 4700 4700 4700 4700   Pulsatility Intermittent pulse Intermittent pulse Intermittent pulse Intermittent pulse Intermittent pulse Intermittent pulse Intermittent pulse       Type 2 diabetes mellitus with hyperglycemia  Sliding scale insulin      Uninterrupted Critical Care/Counseling Time (not including procedures): 60 min.       Jeff Spencer PA-C  Heart Transplant  [facility] - Cardiac Intensive Care

## 2023-11-10 NOTE — ASSESSMENT & PLAN NOTE
Patient presented with [employer] AICD falling out of L chest wall. Apparently had been this way for 1 week  Unclear reason for dehiscence of ICD site  BCx's pending. Continue cefepime and Vanc  EP extracted his device yesterday, complicated by hematoma so wound vac removed. Hemostasis achieved with general surgerys help. Will continue to monitor no interventions planned at the moment.

## 2023-11-10 NOTE — ASSESSMENT & PLAN NOTE
ICD ([employer]) protruding from L chest wall. Placed in 10/2020 at [facility]. 38M with NICM EF 10% and HM III and chronic driveline infections. On warfarin and chronic cephalosporins.  -Consult ID. Broad spectrum antibiotics.  -Therapeutic on warfarin  -Device Interrogation. No VT shocks prior  -Underwent extraction on 11/9/2023 complicated with hematoma/ bleeding with wound vac removal.  General surgery did suture ligation of bleeding vessels and application of hemostatic agents  -Wound care consult for wound vac consideration   If new ICD for primary prevention is required, please let us know

## 2023-11-10 NOTE — PROGRESS NOTES
[facility] - Cardiac Intensive Care  Cardiac Electrophysiology  Progress Note    Admission Date: 11/8/2023  Code Status: Full Code   Attending Physician: Josh Ryan, *   Expected Discharge Date: 11/13/2023  Principal Problem:ICD (implantable cardioverter-defibrillator) malfunction    Subjective:     Interval History: Patient yesterday underwent ICD extraction. Patient had alarm from wound vac and hematoma present. He had bleeding from his left anterior pocket site location, resulting in clotting and loss of suction of his wound VAC. General surgery was notified and his wound VAC was removed with an attempt to achieve bedside hemostasis with silk sutures, Tereso powder, Nu-Knit, and manual pressure. He was thoroughly irrigated with achievement of hemostasis.    Patient had dressing taken down this morning without signs of active bleeding     Review of Systems   Constitutional: Negative.   Cardiovascular:  Negative for irregular heartbeat, orthopnea, palpitations and paroxysmal nocturnal dyspnea.        Chest soreness   All other systems reviewed and are negative.    Objective:     Vital Signs (Most Recent):  Temp: 98 °F (36.7 °C) (11/10/23 0705)  Pulse: 107 (11/10/23 0805)  Resp: (!) 25 (11/10/23 0805)  BP: (!) 82/0 (11/10/23 0705)  SpO2: 96 % (11/10/23 0705) Vital Signs (24h Range):  Temp:  [97.5 °F (36.4 °C)-98.3 °F (36.8 °C)] 98 °F (36.7 °C)  Pulse:  [] 107  Resp:  [10-33] 25  SpO2:  [96 %-100 %] 96 %  BP: ()/(0-91) 82/0  Arterial Line BP: (78-95)/(65-81) 87/73     Weight: 86 kg (189 lb 9.5 oz)  Body mass index is 23.7 kg/m².     SpO2: 96 %        Physical Exam  Constitutional:       Appearance: Normal appearance.   Cardiovascular:      Rate and Rhythm: Regular rhythm. Tachycardia present.      Pulses: Normal pulses.      Heart sounds: Normal heart sounds.      Comments: Vad hum  Chest:      Comments: Left chest ICD pocket with pressure dressing on  Musculoskeletal:      Right lower leg: No  edema.      Left lower leg: No edema.   Skin:     General: Skin is warm.   Neurological:      General: No focal deficit present.      Mental Status: He is alert and oriented to person, place, and time.            Significant Labs: CBC:   Recent Labs   Lab 11/09/23  0635 11/09/23  2039 11/10/23  0359 11/10/23  0608   WBC 7.84 12.00 10.32  --    HGB 9.5* 8.8* 7.6* 6.9*   HCT 31.0* 27.6* 23.9*  --     342 302  --          Assessment and Plan:     ICD (implantable cardioverter-defibrillator) infection  ICD ([employer]) protruding from L chest wall. Placed in 10/2020 at [facility]. 38M with NICM EF 10% and HM III and chronic driveline infections. On warfarin and chronic cephalosporins.  -Consult ID. Broad spectrum antibiotics.  -Therapeutic on warfarin  -Device Interrogation. No VT shocks prior  -Underwent extraction on 11/9/2023 complicated with hematoma/ bleeding with wound vac removal.  General surgery did suture ligation of bleeding vessels and application of hemostatic agents  -Wound care consult for wound vac consideration   If new ICD for primary prevention is required, please let us know        Shalonda Underwood MD  Cardiac Electrophysiology  [facility] - Cardiac Intensive [facility]

## 2023-11-10 NOTE — PLAN OF CARE
"Cardiac ICU Care Plan  NAEON  Pressure dressing CDI, continuing to monitor for bleeding.      POC reviewed with Kevan Queen and family. Questions and concerns addressed. No acute events today. Pt progressing toward goals. Will continue to monitor. See below and flowsheets for full assessment and VS info.       Neuro:  [place] Coma Scale  Best Eye Response: 4-->(E4) spontaneous  Best Motor Response: 6-->(M6) obeys commands  Best Verbal Response: 5-->(V5) oriented  [place] Coma Scale Score: 15  Assessment Qualifiers: patient not sedated/intubated  Pupil PERRLA: yes    24 hr Temp:  [97.5 °F (36.4 °C)-98.3 °F (36.8 °C)]      CV:  Rhythm: sinus tachycardia   DVT prophylaxis: VTE Required Core Measure: Pharmacological prophylaxis initiated/maintained                    Type: HeartMate3       Pulses  Right Radial Pulse: 1+ (weak)  Left Radial Pulse: 1+ (weak)  Right Dorsalis Pedis Pulse: Doppler  Left Dorsalis Pedis Pulse: Doppler  Right Posterior Tibial Pulse: Doppler  Left Posterior Tibial Pulse: Doppler    Resp:          GI/:  GI prophylaxis: no  Diet/Nutrition Received: 2 gram sodium, low saturated fat/low cholesterol, restrict fluids (1500ml)  Last Bowel Movement: 11/08/23      Intake/Output Summary (Last 24 hours) at 11/10/2023 0545  Last data filed at 11/10/2023 0501  Gross per 24 hour   Intake 1834.57 ml   Output 3925 ml   Net -2090.43 ml        Nutritional Supplement Intake: Quantity 0, Type:  none ordered     Labs/Accuchecks:  Recent Labs   Lab 11/09/23  0635 11/09/23  2039 11/10/23  0359   WBC 7.84 12.00 10.32   RBC 4.10* 3.77* 3.32*   HGB 9.5* 8.8* 7.6*   HCT 31.0* 27.6* 23.9*    342 302      Recent Labs   Lab 11/08/23  1420 11/09/23  0635 11/10/23  0359   INR 2.9* 3.1* 2.7*   APTT  --  33.3* 27.5      Recent Labs     11/10/23  0359   *   K 4.5   CO2 27   CL 99   BUN 19   CREATININE 1.4   ALKPHOS 109   ALT 7*   AST 21   BILITOT 1.2*     No results for input(s): "CPK", "CPKMB", "MB", "TROPONINI" " "in the last 168 hours. No results for input(s): "PH", "PCO2", "PO2", "HCO3", "POCSATURATED", "BE" in the last 72 hours.    Electrolytes: Electrolytes replaced  Accuchecks: ACHS    Gtts/LDAs:   milrinone 20mg/100ml D5W (200mcg/ml) 0.25 mcg/kg/min (11/09/23 1906)       Lines/Drains/Airways       Arterial Line  Duration             Arterial Line 11/09/23 1231 Right Radial <1 day              Line  Duration                  VAD 06/29/22 1115 Left ventricular assist device HeartMate 3 498 days              Peripheral Intravenous Line  Duration                  Peripheral IV - Single Lumen 11/08/23 1407 18 G Anterior;Left Forearm 1 day         Peripheral IV - Single Lumen 11/08/23 1745 20 G Left Antecubital 1 day                    Skin/Wounds  Bathing/Skin Care: back care;bath, complete;dressed/undressed;electrode patches/site rotation;foot care;linen changed (11/09/23 2200)  Wounds: No  Wound care consulted: No     Problem: Adult Inpatient Plan of Care  Goal: Plan of Care Review  11/10/2023 0545 by Es Lim RN  Outcome: Ongoing, Progressing  11/10/2023 0544 by Es Lim RN  Outcome: Ongoing, Progressing     "

## 2023-11-10 NOTE — ANESTHESIA POSTPROCEDURE EVALUATION
Anesthesia Post Evaluation    Patient: Kevan Queen    Procedure(s) Performed: Procedure(s) (LRB):  EXTRACTION, ELECTRODE LEAD (Left)  Transesophageal echo (GUILLERMO) intra-procedure log documentation  REVISION, SKIN POCKET, FOR CARDIOVERTER-DEFIBRILLATOR    Final Anesthesia Type: general      Patient location during evaluation: ICU  Patient participation: Yes- Able to Participate  Level of consciousness: awake and alert  Post-procedure vital signs: reviewed and stable  Pain management: adequate  Airway patency: patent    PONV status at discharge: No PONV  Anesthetic complications: no      Cardiovascular status: blood pressure returned to baseline  Respiratory status: unassisted  Hydration status: euvolemic  Follow-up not needed.          Vitals Value Taken Time   BP 82/0 11/10/23 0301   Temp 36.6 °C (97.9 °F) 11/10/23 0301   Pulse 108 11/10/23 0703   Resp 18 11/10/23 0703   SpO2 88 % 11/10/23 0617   Vitals shown include unvalidated device data.      No case tracking events are documented in the log.      Pain/Gopi Score: Pain Rating Prior to Med Admin: 9 (11/9/2023  9:15 PM)  Pain Rating Post Med Admin: 0 (11/9/2023  9:25 PM)  Gopi Score: 9 (11/9/2023  5:00 PM)

## 2023-11-10 NOTE — SUBJECTIVE & OBJECTIVE
Interval History: Patient yesterday underwent ICD extraction. Patient had alarm from wound vac and hematoma present. He had bleeding from his left anterior pocket site location, resulting in clotting and loss of suction of his wound VAC. General surgery was notified and his wound VAC was removed with an attempt to achieve bedside hemostasis with silk sutures, Tereso powder, Nu-Knit, and manual pressure. He was thoroughly irrigated with achievement of hemostasis.    Patient had dressing taken down this morning without signs of active bleeding     Review of Systems   Constitutional: Negative.   Cardiovascular:  Negative for irregular heartbeat, orthopnea, palpitations and paroxysmal nocturnal dyspnea.        Chest soreness   All other systems reviewed and are negative.    Objective:     Vital Signs (Most Recent):  Temp: 98 °F (36.7 °C) (11/10/23 0705)  Pulse: 107 (11/10/23 0805)  Resp: (!) 25 (11/10/23 0805)  BP: (!) 82/0 (11/10/23 0705)  SpO2: 96 % (11/10/23 0705) Vital Signs (24h Range):  Temp:  [97.5 °F (36.4 °C)-98.3 °F (36.8 °C)] 98 °F (36.7 °C)  Pulse:  [] 107  Resp:  [10-33] 25  SpO2:  [96 %-100 %] 96 %  BP: ()/(0-91) 82/0  Arterial Line BP: (78-95)/(65-81) 87/73     Weight: 86 kg (189 lb 9.5 oz)  Body mass index is 23.7 kg/m².     SpO2: 96 %        Physical Exam  Constitutional:       Appearance: Normal appearance.   Cardiovascular:      Rate and Rhythm: Regular rhythm. Tachycardia present.      Pulses: Normal pulses.      Heart sounds: Normal heart sounds.      Comments: Vad hum  Chest:      Comments: Left chest ICD pocket with pressure dressing on  Musculoskeletal:      Right lower leg: No edema.      Left lower leg: No edema.   Skin:     General: Skin is warm.   Neurological:      General: No focal deficit present.      Mental Status: He is alert and oriented to person, place, and time.            Significant Labs: CBC:   Recent Labs   Lab 11/09/23 0635 11/09/23  2039 11/10/23  0354  11/10/23  0608   WBC 7.84 12.00 10.32  --    HGB 9.5* 8.8* 7.6* 6.9*   HCT 31.0* 27.6* 23.9*  --     342 302  --

## 2023-11-10 NOTE — ASSESSMENT & PLAN NOTE
Procedure: Device Interrogation Including analysis of device parameters  Current Settings: Ventricular Assist Device  INR therapeutic but will hold given ICD pocket bleed.   Review of device function is stable/unstable stable        11/10/2023     2:05 PM 11/10/2023     1:05 PM 11/10/2023    12:05 PM 11/10/2023    11:05 AM 11/10/2023    10:05 AM 11/10/2023     9:05 AM 11/10/2023     8:05 AM   TXP LVAD INTERROGATIONS   Type HeartMate3 HeartMate3 HeartMate3 HeartMate3 HeartMate3 HeartMate3 HeartMate3   Flow 3.7 3.8 4 3.8 4 3.9 3.8   Speed 5100 5100 5100 5100 5100 5100 5100   PI 6.1 5 4.9 5.8 4.7 5.8 5   Power (Serna) 3.5 3.6 3.8 3.6 3.5 3.6 3.5   LSL 4700 4700 4700 4700 4700 4700 4700   Pulsatility Intermittent pulse Intermittent pulse Intermittent pulse Intermittent pulse Intermittent pulse Intermittent pulse Intermittent pulse

## 2023-11-10 NOTE — HPI
Mr. Queen is a 37yo male with HF (EF 10-15%), s/p LVAD placement with HeartMate III, ICD placement 1/2022, DM2, epilepsy who presented as his ICD began to erode through his skin and was exposed.  He went earlier today for ICD removal by EP and wound vac placement.  Post operatively in PACU it was noted that he was having swelling at his wound vac site as well as the wound vac was having occlusion alarms.  Given these findings general surgery was called to come evaluate the wound vac by cardiology and the hospital medicine service.  On evaluation patient has a significantly swollen left chest.  The wound vac at this time had around 600cc of sanguinous output collected.  Given the swelling and wound vac not working the decision was made to take down the wound vac dressing to better assess.  Immediately found a large hematoma about the size of a baseball that was evacuated from his prior ICD pocket.  Initially it appeared to be muscle oozing and an attempt at placing nu knit was done along with pressure and a new gauze dressing.  However this was unfortunately not satisfactory and the dressing was saturated shortly after secondary to significant continued bleeding.  The dressing was removed and the ICD pocket was further evaluated.  After further evaluation a couple vessels were identified that were injured and had active bleeding resulting in rapid pooling of blood within his ICD pocket.  Discussed with patient and elected to attempt suture ligation of these bleeding vessels with figure of 8 stitches.  The most significant bleeding after this appeared to be controlled and what was left seen appeared to be muscle wall oozing.  Tereso was sprayed in the area and a new nu knit was placed over this.  The pocket was again packed with gauze and a pressure dressing was placed over the area.  The patient was checked on again a couple hours later and the dressing appears to be holding without signs of significant bleeding.   This was discussed with the hospital medicine team as well as cardiology.  The EP attending was at bedside as well during portions of the exam/procedure.     Of note patient is on coumadin for his HeartMate III LVAD and reversing his anticoagulation would pose a significant risk for thrombosis of this and deemed to high of a risk to do.

## 2023-11-10 NOTE — CARE UPDATE
I was notified around 1900 that patient had continued bleeding from his left anterior pocket site location. At the time of my examination, in the PACU, patient was noted to be hemodynamically stable and in no acute distress. Surgical site noted to be enlarging. Appeared to be a hematoma. Wound vac alarming that it was blocked. On call Cardiology fellow, EP fellow, and EP staff Dr. Leon were notified promptly. Due to lack of procedure space afterhours for EP, decision was made to consult General Surgery for assistance. General surgery evaluated the patient and removed wound vac at bedside. Initial hemostasis attempt with surgicele and gauze failed. General surgery w/ Dr. Leon at bedside attempted to achieve bedside hemostasis again. After bedside hemostasis was achieved, patient was transferred to ICU for closer monitoring. Hgb post procedure was 8.8 from 9.5 prior to ICD extraction procedure in the AM. Patient's mother was informed. For further details please refer to EP note, surgical procedure note, and PACU bedside nurse note.     Plan was discussed with [facility] staff.

## 2023-11-10 NOTE — PROGRESS NOTES
11/10/2023  John Alaniz    Current provider:  Josh Ryan, *    Device interrogation:      11/10/2023     1:05 PM 11/10/2023    12:05 PM 11/10/2023    11:05 AM 11/10/2023    10:05 AM 11/10/2023     9:05 AM 11/10/2023     8:05 AM 11/10/2023     7:05 AM   TXP LVAD INTERROGATIONS   Type HeartMate3 HeartMate3 HeartMate3 HeartMate3 HeartMate3 HeartMate3 HeartMate3   Flow 3.8 4 3.8 4 3.9 3.8 4.2   Speed 5100 5100 5100 5100 5100 5100 5100   PI 5 4.9 5.8 4.7 5.8 5 3   Power (Serna) 3.6 3.8 3.6 3.5 3.6 3.5 3.5   LSL 4700 4700 4700 4700 4700 4700 4700   Pulsatility Intermittent pulse Intermittent pulse Intermittent pulse Intermittent pulse Intermittent pulse Intermittent pulse Intermittent pulse          Rounded on Kevan Queen to ensure all mechanical assist device settings (IABP or VAD) were appropriate and all parameters were within limits.  I was able to ensure all back up equipment was present, the staff had no issues, and the Perfusion Department daily rounding was complete.      For implantable VADs: Interrogation of Ventricular assist device was performed with analysis of device parameters and review of device function. I have personally reviewed the interrogation findings and agree with findings as stated.     In emergency, the nursing units have been notified to contact the perfusion department either by:  Calling [phone] from 630am to 4pm Mon thru Fri, utilizing the On-Call Finder functionality of Epic and searching for Perfusion, or by contacting the hospital  from 4pm to 630am and on weekends and asking to speak with the perfusionist on call.    1:49 PM

## 2023-11-10 NOTE — CARE UPDATE
Was contacted by primary team regarding continued bleeding from pocket site and wound vac alarm. Arrived at bedside to assess patient. Discussed with EP fellow on call. Dr. Herndon contacted Gen surgery to discuss the situation. I further dicussed with Gen surgery who came to bedside to assess. Notified Dr. Leon who also came to bedside to assess patient. Wound vac was removed and general surgery performed irrigation of the wound and was able to successfully attain hemostasis. Patient was subsequently transferred to CICU for closer monitoring.     Eleuterio Genao MD  Cardiovascular Disease PGY5  [facility]

## 2023-11-10 NOTE — PLAN OF CARE
BG monitored. VSS. LVAD number and doppler WNL. LVAD dressing CDI. Keep NPO for procedure. Pt educated on fall risk and remained free from falls/trauma/injury. Denies chest pain, SOB, palpitations, dizziness, pain, or discomfort. Plan of care reviewed with pt, all questions answered. Bed locked in lowest position, call bell within reach, no acute distress noted, will continue to monitor.

## 2023-11-10 NOTE — NURSING
Admitted to room 319, alert and oriented x4, denies pain, no s/s of distress, oriented to room and call light,assessment done, VS WNL, comfort measures given, safety instructions given, verbalized understaning,call light within reach, instructed to call for assistance out of bed, call light within reach, will continue to monitor.

## 2023-11-10 NOTE — CARE UPDATE
Care Update:     Consult acknowledged, full consult note to come in the morning.   BG excursions noted.     Diet Cardiac Fluid - 1500mL     POCT Glucose   Date Value Ref Range Status   11/10/2023 408 (H) 70 - 110 mg/dL Final   11/10/2023 422 (H) 70 - 110 mg/dL Final   11/10/2023 333 (H) 70 - 110 mg/dL Final   11/10/2023 316 (H) 70 - 110 mg/dL Final   11/10/2023 341 (H) 70 - 110 mg/dL Final   11/09/2023 248 (H) 70 - 110 mg/dL Final   11/09/2023 194 (H) 70 - 110 mg/dL Final   11/09/2023 215 (H) 70 - 110 mg/dL Final   11/08/2023 266 (H) 70 - 110 mg/dL Final     Lab Results   Component Value Date    HGBA1C 6.9 (H) 08/31/2023     Diabetes Medications    Home Medication           insulin aspart U-100 (NOVOLOG) 100 unit/mL (3 mL) InPn pen Inject 6 Units into the skin 3 (three) times daily with meals. Plus Sliding Scale: 151-200: +1, 201-250: +2, 251-300: +3, 301-350: +4 and call MD    insulin detemir U-100, Levemir, 100 unit/mL (3 mL) SubQ InPn pen Inject 8 Units into the skin 2 (two) times daily.          Endocrinology consulted for BG management.   BG goal 140-180     - Transition drip at 1 units/hr with step-down parameters. (Based on previous admission requirements)   - Novolog (aspart) insulin 6 Units SQ TIDWM and prn for BG excursions [facility] [place] (150/25).  - BG checks q4hr  - Hypoglycemia protocol in place    ** Please notify Endocrine for any change and/or advance in diet**  ** Please call Endocrine for any BG related issues **    Discharge Planning:   TBD. Please notify endocrinology prior to discharge.      ** Please notify Endocrine for any change and/or advance in diet**  ** Please call Endocrine for any BG related issues **    Discharge Planning:   TBD. Please notify endocrinology prior to discharge.      Michael Wyman DNP, FNP-C  Department of Endocrinology  Inpatient Glycemic Management

## 2023-11-10 NOTE — PROGRESS NOTES
[facility] - Cardiac [facility]ensive [facility]  General Surgery  Progress Note    Subjective:     History of Present Illness:   Mr. Queen is a 37yo male with HF (EF 10-15%), s/p LVAD placement with HeartMate III, ICD placement 1/2022, DM2, epilepsy who presented as his ICD began to erode through his skin and was exposed.  He went earlier today for ICD removal by EP and wound vac placement.  Post operatively in PACU it was noted that he was having swelling at his wound vac site as well as the wound vac was having occlusion alarms.  Given these findings general surgery was called to come evaluate the wound vac by cardiology and the hospital medicine service.  On evaluation patient has a significantly swollen left chest.  The wound vac at this time had around 600cc of sanguinous output collected.  Given the swelling and wound vac not working the decision was made to take down the wound vac dressing to better assess.  Immediately found a large hematoma about the size of a baseball that was evacuated from his prior ICD pocket.  Initially it appeared to be muscle oozing and an attempt at placing nu knit was done along with pressure and a new gauze dressing.  However this was unfortunately not satisfactory and the dressing was saturated shortly after secondary to significant continued bleeding.  The dressing was removed and the ICD pocket was further evaluated.  After further evaluation a couple vessels were identified that were injured and had active bleeding resulting in rapid pooling of blood within his ICD pocket.  Discussed with patient and elected to attempt suture ligation of these bleeding vessels with figure of 8 stitches.  The most significant bleeding after this appeared to be controlled and what was left seen appeared to be muscle wall oozing.  Tereso was sprayed in the area and a new nu knit was placed over this.  The pocket was again packed with gauze and a pressure dressing was placed over the area.  The patient was  checked on again a couple hours later and the dressing appears to be holding without signs of significant bleeding.  This was discussed with the hospital medicine team as well as cardiology.  The EP attending was at bedside as well during portions of the exam/procedure.     Of note patient is on coumadin for his HeartMate III LVAD and reversing his anticoagulation would pose a significant risk for thrombosis of this and deemed to high of a risk to do.    Post-Op Info:  Procedure(s) (LRB):  EXTRACTION, ELECTRODE LEAD (Left)  Transesophageal echo (GUILLERMO) intra-procedure log documentation  REVISION, SKIN POCKET, FOR CARDIOVERTER-DEFIBRILLATOR   1 Day Post-Op     Interval History: Dressing taken down this morning and no signs of active bleeding. Re-dressed.     Medications:  Continuous Infusions:   milrinone 20mg/100ml D5W (200mcg/ml) 0.25 mcg/kg/min (11/10/23 0805)     Scheduled Meds:   aspirin  81 mg Oral Daily    atorvastatin  40 mg Oral Daily    ceFEPime (MAXIPIME) IVPB  2 g Intravenous Q8H    furosemide  80 mg Oral Daily    insulin aspart U-100  1 Units Subcutaneous TIDWM    insulin detemir U-100  4 Units Subcutaneous BID    levETIRAcetam  1,000 mg Oral BID    magnesium oxide  400 mg Oral Daily    magnesium sulfate IVPB  2 g Intravenous Once    mirtazapine  15 mg Oral Nightly    pantoprazole  40 mg Oral Daily    spironolactone  25 mg Oral Daily    vancomycin (VANCOCIN) IV (PEDS and ADULTS)  1,000 mg Intravenous Q12H    warfarin  4 mg Oral Daily     PRN Meds:acetaminophen, dextrose 10%, dextrose 10%, glucagon (human recombinant), glucose, glucose, sodium chloride 0.9%, Pharmacy to dose Vancomycin consult **AND** vancomycin - pharmacy to dose     Review of patient's allergies indicates:   Allergen Reactions    Bumex [bumetanide] Hives    Torsemide Hives     Objective:     Vital Signs (Most Recent):  Temp: 98 °F (36.7 °C) (11/10/23 0705)  Pulse: 107 (11/10/23 0805)  Resp: (!) 25 (11/10/23 0805)  BP: (!) 82/0 (11/10/23  0705)  SpO2: 96 % (11/10/23 0705) Vital Signs (24h Range):  Temp:  [97.5 °F (36.4 °C)-98.3 °F (36.8 °C)] 98 °F (36.7 °C)  Pulse:  [] 107  Resp:  [10-33] 25  SpO2:  [96 %-100 %] 96 %  BP: ()/(0-91) 82/0  Arterial Line BP: (78-95)/(65-81) 87/73     Weight: 86 kg (189 lb 9.5 oz)  Body mass index is 23.7 kg/m².    Intake/Output - Last 3 Shifts         11/08 0700  11/09 0659 11/09 0700  11/10 0659 11/10 0700  11/11 0659    P.O. 300 950     I.V. (mL/kg)  255.6 (3) 106.4 (1.2)    IV Piggyback  802.8 47.2    Total Intake(mL/kg) 300 (3.3) 2008.4 (23.4) 153.7 (1.8)    Urine (mL/kg/hr) 3150 3000 (1.5)     Other  600     Total Output 3150 3600     Net -2850 -1591.6 +153.7                    Physical Exam  Constitutional:       Appearance: Normal appearance.   Cardiovascular:      Rate and Rhythm: Tachycardia present.      Comments: L chest ICD pocket with no active bleeding, clean and dry.  Pulmonary:      Effort: Pulmonary effort is normal.   Skin:     General: Skin is warm and dry.   Neurological:      Mental Status: He is alert.          Significant Labs:  I have reviewed all pertinent lab results within the past 24 hours.  CBC:   Recent Labs   Lab 11/10/23  0359 11/10/23  0608   WBC 10.32  --    RBC 3.32*  --    HGB 7.6* 6.9*   HCT 23.9*  --      --    MCV 72*  --    MCH 22.9*  --    MCHC 31.8*  --      BMP:   Recent Labs   Lab 11/10/23  0359   *   *   K 4.5   CL 99   CO2 27   BUN 19   CREATININE 1.4   CALCIUM 8.9   MG 1.8       Significant Diagnostics:  I have reviewed all pertinent imaging results/findings within the past 24 hours.  Assessment/Plan:     ICD (implantable cardioverter-defibrillator) infection  Mr. Queen is a 39yo male who underwent an ICD removal 11/9 secondary to it eroding through the skin.  Post procedure he was noted to have significant bleeding from some bleeding vessels as well as likely confounded with his coagulopathy from his coumadin which he takes for his LVAD.  S/p suture ligation of bleeding vessels and application of hemostatic agents with pressure dressing in PACU on 11/9     - dressing changed at bedside, no signs of active bleeding  - recommend wound care consult for consideration of wound vac given no persistent bleeding; dressing changes per primary team who removed the device   - please call with questions        Jordyn Maria MD  General Surgery  [facility]

## 2023-11-10 NOTE — NURSING TRANSFER
Nursing Transfer Note      11/9/2023   10:54 PM    Nurse giving handoff:Kindra MODI  Nurse receiving handoff:Kaitlyn MODI    Reason patient is being transferred: MD order    Transfer To: [id number]    Transfer via stretcher    Transfer with cardiac monitoring    Transported by RN    Order for Tele Monitor? Yes      Medicines sent: [facility] infusion  Patient belongings transferred with patient: No    Chart send with patient: Yes    Notified: Mother    Patient reassessed at: 2100

## 2023-11-10 NOTE — PROGRESS NOTES
Heart Transplant  attempted to speak  to pt to complete hospital admit note. Pt's phone is currently off.  Worker spoke to the pt's nurse, pt is sleeping.   Transplant Social Workers will continue to follow.

## 2023-11-11 LAB
ALBUMIN SERPL BCP-MCNC: 2.7 G/DL (ref 3.5–5.2)
ALP SERPL-CCNC: 103 U/L (ref 55–135)
ALT SERPL W/O P-5'-P-CCNC: 8 U/L (ref 10–44)
ANION GAP SERPL CALC-SCNC: 10 MMOL/L (ref 8–16)
ANION GAP SERPL CALC-SCNC: 7 MMOL/L (ref 8–16)
ANION GAP SERPL CALC-SCNC: 9 MMOL/L (ref 8–16)
APTT PPP: 25.2 SEC (ref 21–32)
AST SERPL-CCNC: 17 U/L (ref 10–40)
BASOPHILS # BLD AUTO: 0.06 K/UL (ref 0–0.2)
BASOPHILS NFR BLD: 0.5 % (ref 0–1.9)
BILIRUB SERPL-MCNC: 2.2 MG/DL (ref 0.1–1)
BUN SERPL-MCNC: 14 MG/DL (ref 6–20)
BUN SERPL-MCNC: 16 MG/DL (ref 6–20)
BUN SERPL-MCNC: 17 MG/DL (ref 6–20)
CALCIUM SERPL-MCNC: 8.5 MG/DL (ref 8.7–10.5)
CALCIUM SERPL-MCNC: 8.5 MG/DL (ref 8.7–10.5)
CALCIUM SERPL-MCNC: 8.7 MG/DL (ref 8.7–10.5)
CHLORIDE SERPL-SCNC: 100 MMOL/L (ref 95–110)
CHLORIDE SERPL-SCNC: 99 MMOL/L (ref 95–110)
CHLORIDE SERPL-SCNC: 99 MMOL/L (ref 95–110)
CO2 SERPL-SCNC: 26 MMOL/L (ref 23–29)
CO2 SERPL-SCNC: 27 MMOL/L (ref 23–29)
CO2 SERPL-SCNC: 29 MMOL/L (ref 23–29)
CREAT SERPL-MCNC: 1.2 MG/DL (ref 0.5–1.4)
CREAT SERPL-MCNC: 1.2 MG/DL (ref 0.5–1.4)
CREAT SERPL-MCNC: 1.3 MG/DL (ref 0.5–1.4)
DIFFERENTIAL METHOD: ABNORMAL
EOSINOPHIL # BLD AUTO: 0.1 K/UL (ref 0–0.5)
EOSINOPHIL NFR BLD: 0.5 % (ref 0–8)
ERYTHROCYTE [DISTWIDTH] IN BLOOD BY AUTOMATED COUNT: 20 % (ref 11.5–14.5)
EST. GFR  (NO RACE VARIABLE): >60 ML/MIN/1.73 M^2
GLUCOSE SERPL-MCNC: 201 MG/DL (ref 70–110)
GLUCOSE SERPL-MCNC: 260 MG/DL (ref 70–110)
GLUCOSE SERPL-MCNC: 284 MG/DL (ref 70–110)
HCT VFR BLD AUTO: 23.9 % (ref 40–54)
HGB BLD-MCNC: 7.5 G/DL (ref 14–18)
HGB BLD-MCNC: 7.5 G/DL (ref 14–18)
HGB BLD-MCNC: 7.8 G/DL (ref 14–18)
IMM GRANULOCYTES # BLD AUTO: 0.03 K/UL (ref 0–0.04)
IMM GRANULOCYTES NFR BLD AUTO: 0.2 % (ref 0–0.5)
INR PPP: 1.5 (ref 0.8–1.2)
LDH SERPL L TO P-CCNC: 278 U/L (ref 110–260)
LYMPHOCYTES # BLD AUTO: 2.7 K/UL (ref 1–4.8)
LYMPHOCYTES NFR BLD: 21.5 % (ref 18–48)
MAGNESIUM SERPL-MCNC: 1.7 MG/DL (ref 1.6–2.6)
MAGNESIUM SERPL-MCNC: 1.8 MG/DL (ref 1.6–2.6)
MAGNESIUM SERPL-MCNC: 1.9 MG/DL (ref 1.6–2.6)
MCH RBC QN AUTO: 23.7 PG (ref 27–31)
MCHC RBC AUTO-ENTMCNC: 31.4 G/DL (ref 32–36)
MCV RBC AUTO: 76 FL (ref 82–98)
MONOCYTES # BLD AUTO: 1.1 K/UL (ref 0.3–1)
MONOCYTES NFR BLD: 8.5 % (ref 4–15)
NEUTROPHILS # BLD AUTO: 8.8 K/UL (ref 1.8–7.7)
NEUTROPHILS NFR BLD: 68.8 % (ref 38–73)
NRBC BLD-RTO: 0 /100 WBC
PLATELET # BLD AUTO: 279 K/UL (ref 150–450)
PMV BLD AUTO: 11.1 FL (ref 9.2–12.9)
POCT GLUCOSE: 198 MG/DL (ref 70–110)
POCT GLUCOSE: 268 MG/DL (ref 70–110)
POCT GLUCOSE: 272 MG/DL (ref 70–110)
POCT GLUCOSE: 288 MG/DL (ref 70–110)
POCT GLUCOSE: 310 MG/DL (ref 70–110)
POCT GLUCOSE: 333 MG/DL (ref 70–110)
POCT GLUCOSE: 339 MG/DL (ref 70–110)
POTASSIUM SERPL-SCNC: 3.3 MMOL/L (ref 3.5–5.1)
POTASSIUM SERPL-SCNC: 3.5 MMOL/L (ref 3.5–5.1)
POTASSIUM SERPL-SCNC: 3.6 MMOL/L (ref 3.5–5.1)
PROT SERPL-MCNC: 6.4 G/DL (ref 6–8.4)
PROTHROMBIN TIME: 16.2 SEC (ref 9–12.5)
RBC # BLD AUTO: 3.16 M/UL (ref 4.6–6.2)
SODIUM SERPL-SCNC: 135 MMOL/L (ref 136–145)
SODIUM SERPL-SCNC: 135 MMOL/L (ref 136–145)
SODIUM SERPL-SCNC: 136 MMOL/L (ref 136–145)
VANCOMYCIN TROUGH SERPL-MCNC: 8.8 UG/ML (ref 10–22)
WBC # BLD AUTO: 12.75 K/UL (ref 3.9–12.7)

## 2023-11-11 PROCEDURE — 83735 ASSAY OF MAGNESIUM: CPT | Performed by: STUDENT IN AN ORGANIZED HEALTH CARE EDUCATION/TRAINING PROGRAM

## 2023-11-11 PROCEDURE — 93750 PR INTERROGATE VENT ASSIST DEV, IN PERSON, W PHYSICIAN ANALYSIS: ICD-10-PCS | Mod: ,,, | Performed by: INTERNAL MEDICINE

## 2023-11-11 PROCEDURE — 25000003 PHARM REV CODE 250: Performed by: INTERNAL MEDICINE

## 2023-11-11 PROCEDURE — 20600001 HC STEP DOWN PRIVATE ROOM

## 2023-11-11 PROCEDURE — 99233 SBSQ HOSP IP/OBS HIGH 50: CPT | Mod: FS,,, | Performed by: NURSE PRACTITIONER

## 2023-11-11 PROCEDURE — 25000003 PHARM REV CODE 250: Performed by: NURSE PRACTITIONER

## 2023-11-11 PROCEDURE — 25000003 PHARM REV CODE 250: Performed by: STUDENT IN AN ORGANIZED HEALTH CARE EDUCATION/TRAINING PROGRAM

## 2023-11-11 PROCEDURE — 63600175 PHARM REV CODE 636 W HCPCS: Performed by: STUDENT IN AN ORGANIZED HEALTH CARE EDUCATION/TRAINING PROGRAM

## 2023-11-11 PROCEDURE — 85610 PROTHROMBIN TIME: CPT | Performed by: STUDENT IN AN ORGANIZED HEALTH CARE EDUCATION/TRAINING PROGRAM

## 2023-11-11 PROCEDURE — 63600175 PHARM REV CODE 636 W HCPCS: Performed by: INTERNAL MEDICINE

## 2023-11-11 PROCEDURE — 99222 1ST HOSP IP/OBS MODERATE 55: CPT | Mod: ,,, | Performed by: NURSE PRACTITIONER

## 2023-11-11 PROCEDURE — 99222 PR INITIAL HOSPITAL CARE,LEVL II: ICD-10-PCS | Mod: ,,, | Performed by: NURSE PRACTITIONER

## 2023-11-11 PROCEDURE — 27000248 HC VAD-ADDITIONAL DAY

## 2023-11-11 PROCEDURE — 63600175 PHARM REV CODE 636 W HCPCS: Performed by: PHYSICIAN ASSISTANT

## 2023-11-11 PROCEDURE — 99233 PR SUBSEQUENT HOSPITAL CARE,LEVL III: ICD-10-PCS | Mod: FS,,, | Performed by: NURSE PRACTITIONER

## 2023-11-11 PROCEDURE — 80048 BASIC METABOLIC PNL TOTAL CA: CPT | Mod: 91,XB | Performed by: INTERNAL MEDICINE

## 2023-11-11 PROCEDURE — 63600175 PHARM REV CODE 636 W HCPCS: Performed by: NURSE PRACTITIONER

## 2023-11-11 PROCEDURE — 85018 HEMOGLOBIN: CPT | Performed by: STUDENT IN AN ORGANIZED HEALTH CARE EDUCATION/TRAINING PROGRAM

## 2023-11-11 PROCEDURE — 25000003 PHARM REV CODE 250: Performed by: PHYSICIAN ASSISTANT

## 2023-11-11 PROCEDURE — 83735 ASSAY OF MAGNESIUM: CPT | Mod: 91 | Performed by: INTERNAL MEDICINE

## 2023-11-11 PROCEDURE — 85730 THROMBOPLASTIN TIME PARTIAL: CPT | Performed by: STUDENT IN AN ORGANIZED HEALTH CARE EDUCATION/TRAINING PROGRAM

## 2023-11-11 PROCEDURE — 36415 COLL VENOUS BLD VENIPUNCTURE: CPT | Performed by: NURSE PRACTITIONER

## 2023-11-11 PROCEDURE — 83615 LACTATE (LD) (LDH) ENZYME: CPT | Performed by: STUDENT IN AN ORGANIZED HEALTH CARE EDUCATION/TRAINING PROGRAM

## 2023-11-11 PROCEDURE — A4216 STERILE WATER/SALINE, 10 ML: HCPCS | Performed by: STUDENT IN AN ORGANIZED HEALTH CARE EDUCATION/TRAINING PROGRAM

## 2023-11-11 PROCEDURE — 80202 ASSAY OF VANCOMYCIN: CPT | Performed by: INTERNAL MEDICINE

## 2023-11-11 PROCEDURE — 36415 COLL VENOUS BLD VENIPUNCTURE: CPT | Performed by: INTERNAL MEDICINE

## 2023-11-11 PROCEDURE — 85025 COMPLETE CBC W/AUTO DIFF WBC: CPT | Performed by: STUDENT IN AN ORGANIZED HEALTH CARE EDUCATION/TRAINING PROGRAM

## 2023-11-11 PROCEDURE — 85018 HEMOGLOBIN: CPT | Mod: 91 | Performed by: NURSE PRACTITIONER

## 2023-11-11 PROCEDURE — 99233 PR SUBSEQUENT HOSPITAL CARE,LEVL III: ICD-10-PCS | Mod: ,,, | Performed by: STUDENT IN AN ORGANIZED HEALTH CARE EDUCATION/TRAINING PROGRAM

## 2023-11-11 PROCEDURE — 99233 SBSQ HOSP IP/OBS HIGH 50: CPT | Mod: ,,, | Performed by: STUDENT IN AN ORGANIZED HEALTH CARE EDUCATION/TRAINING PROGRAM

## 2023-11-11 PROCEDURE — 93750 INTERROGATION VAD IN PERSON: CPT | Mod: ,,, | Performed by: INTERNAL MEDICINE

## 2023-11-11 PROCEDURE — 80053 COMPREHEN METABOLIC PANEL: CPT | Performed by: STUDENT IN AN ORGANIZED HEALTH CARE EDUCATION/TRAINING PROGRAM

## 2023-11-11 PROCEDURE — 94761 N-INVAS EAR/PLS OXIMETRY MLT: CPT

## 2023-11-11 RX ORDER — LANOLIN ALCOHOL/MO/W.PET/CERES
400 CREAM (GRAM) TOPICAL 2 TIMES DAILY
Status: DISCONTINUED | OUTPATIENT
Start: 2023-11-11 | End: 2023-11-15 | Stop reason: HOSPADM

## 2023-11-11 RX ORDER — POTASSIUM CHLORIDE 20 MEQ/1
20 TABLET, EXTENDED RELEASE ORAL ONCE
Status: COMPLETED | OUTPATIENT
Start: 2023-11-11 | End: 2023-11-11

## 2023-11-11 RX ORDER — POTASSIUM CHLORIDE 20 MEQ/1
40 TABLET, EXTENDED RELEASE ORAL ONCE
Status: COMPLETED | OUTPATIENT
Start: 2023-11-11 | End: 2023-11-11

## 2023-11-11 RX ORDER — INSULIN ASPART 100 [IU]/ML
4-8 INJECTION, SOLUTION INTRAVENOUS; SUBCUTANEOUS
Status: DISCONTINUED | OUTPATIENT
Start: 2023-11-11 | End: 2023-11-13

## 2023-11-11 RX ORDER — MAGNESIUM SULFATE 1 G/100ML
1 INJECTION INTRAVENOUS ONCE
Status: COMPLETED | OUTPATIENT
Start: 2023-11-11 | End: 2023-11-11

## 2023-11-11 RX ORDER — SODIUM CHLORIDE 9 MG/ML
INJECTION, SOLUTION INTRAVENOUS CONTINUOUS
Status: DISCONTINUED | OUTPATIENT
Start: 2023-11-11 | End: 2023-11-15 | Stop reason: HOSPADM

## 2023-11-11 RX ADMIN — INSULIN ASPART 4 UNITS: 100 INJECTION, SOLUTION INTRAVENOUS; SUBCUTANEOUS at 12:11

## 2023-11-11 RX ADMIN — LEVETIRACETAM 1000 MG: 500 TABLET, FILM COATED ORAL at 09:11

## 2023-11-11 RX ADMIN — MAGNESIUM SULFATE HEPTAHYDRATE 1 G: 500 INJECTION, SOLUTION INTRAMUSCULAR; INTRAVENOUS at 06:11

## 2023-11-11 RX ADMIN — CEFEPIME 2 G: 2 INJECTION, POWDER, FOR SOLUTION INTRAVENOUS at 12:11

## 2023-11-11 RX ADMIN — Medication 10 ML: at 12:11

## 2023-11-11 RX ADMIN — CEFEPIME 2 G: 2 INJECTION, POWDER, FOR SOLUTION INTRAVENOUS at 05:11

## 2023-11-11 RX ADMIN — MILRINONE LACTATE IN DEXTROSE 0.25 MCG/KG/MIN: 200 INJECTION, SOLUTION INTRAVENOUS at 11:11

## 2023-11-11 RX ADMIN — INSULIN ASPART 8 UNITS: 100 INJECTION, SOLUTION INTRAVENOUS; SUBCUTANEOUS at 12:11

## 2023-11-11 RX ADMIN — INSULIN ASPART 2 UNITS: 100 INJECTION, SOLUTION INTRAVENOUS; SUBCUTANEOUS at 05:11

## 2023-11-11 RX ADMIN — CEFEPIME 2 G: 2 INJECTION, POWDER, FOR SOLUTION INTRAVENOUS at 09:11

## 2023-11-11 RX ADMIN — INSULIN ASPART 6 UNITS: 100 INJECTION, SOLUTION INTRAVENOUS; SUBCUTANEOUS at 08:11

## 2023-11-11 RX ADMIN — FUROSEMIDE 80 MG: 80 TABLET ORAL at 09:11

## 2023-11-11 RX ADMIN — VANCOMYCIN HYDROCHLORIDE 1000 MG: 1 INJECTION, POWDER, LYOPHILIZED, FOR SOLUTION INTRAVENOUS at 05:11

## 2023-11-11 RX ADMIN — INSULIN ASPART 8 UNITS: 100 INJECTION, SOLUTION INTRAVENOUS; SUBCUTANEOUS at 08:11

## 2023-11-11 RX ADMIN — SODIUM CHLORIDE: 9 INJECTION, SOLUTION INTRAVENOUS at 09:11

## 2023-11-11 RX ADMIN — Medication 400 MG: at 08:11

## 2023-11-11 RX ADMIN — MIRTAZAPINE 15 MG: 15 TABLET, FILM COATED ORAL at 08:11

## 2023-11-11 RX ADMIN — LEVETIRACETAM 1000 MG: 500 TABLET, FILM COATED ORAL at 08:11

## 2023-11-11 RX ADMIN — INSULIN ASPART 8 UNITS: 100 INJECTION, SOLUTION INTRAVENOUS; SUBCUTANEOUS at 05:11

## 2023-11-11 RX ADMIN — PANTOPRAZOLE SODIUM 40 MG: 40 TABLET, DELAYED RELEASE ORAL at 09:11

## 2023-11-11 RX ADMIN — POTASSIUM CHLORIDE 40 MEQ: 1500 TABLET, EXTENDED RELEASE ORAL at 05:11

## 2023-11-11 RX ADMIN — INSULIN ASPART 4 UNITS: 100 INJECTION, SOLUTION INTRAVENOUS; SUBCUTANEOUS at 05:11

## 2023-11-11 RX ADMIN — ATORVASTATIN CALCIUM 40 MG: 40 TABLET, FILM COATED ORAL at 09:11

## 2023-11-11 RX ADMIN — MILRINONE LACTATE IN DEXTROSE 0.25 MCG/KG/MIN: 200 INJECTION, SOLUTION INTRAVENOUS at 10:11

## 2023-11-11 RX ADMIN — VANCOMYCIN HYDROCHLORIDE 1500 MG: 1.5 INJECTION, POWDER, LYOPHILIZED, FOR SOLUTION INTRAVENOUS at 05:11

## 2023-11-11 RX ADMIN — INSULIN ASPART 6 UNITS: 100 INJECTION, SOLUTION INTRAVENOUS; SUBCUTANEOUS at 11:11

## 2023-11-11 RX ADMIN — INSULIN HUMAN 2 UNITS/HR: 1 INJECTION, SOLUTION INTRAVENOUS at 10:11

## 2023-11-11 RX ADMIN — POTASSIUM CHLORIDE 40 MEQ: 1500 TABLET, EXTENDED RELEASE ORAL at 10:11

## 2023-11-11 RX ADMIN — POTASSIUM CHLORIDE 40 MEQ: 1500 TABLET, EXTENDED RELEASE ORAL at 01:11

## 2023-11-11 RX ADMIN — ASPIRIN 81 MG: 81 TABLET, COATED ORAL at 09:11

## 2023-11-11 RX ADMIN — Medication 400 MG: at 09:11

## 2023-11-11 RX ADMIN — SPIRONOLACTONE 25 MG: 25 TABLET ORAL at 09:11

## 2023-11-11 RX ADMIN — CEFEPIME 2 G: 2 INJECTION, POWDER, FOR SOLUTION INTRAVENOUS at 11:11

## 2023-11-11 RX ADMIN — POTASSIUM CHLORIDE 20 MEQ: 1500 TABLET, EXTENDED RELEASE ORAL at 09:11

## 2023-11-11 NOTE — ASSESSMENT & PLAN NOTE
38M with PMH of NICM EF 10-15%, ICD placement in Jan 2022, HM3 LVAD placement on 6/29/23 with DLESI 2/2 MSSA in Sept 2023, previous MV repair, DM2, and epilepsy admitted for erosion of ICD s/p complete device removal - OR cx (pocket with staph aureus thus far, lead cx so far no growth to date. Blood cultures on admit no growth to date.          Recommendations  Continue vancomycin pharmacy to dose and cefepime  Goal trough 15-20  F/u OR cx

## 2023-11-11 NOTE — SUBJECTIVE & OBJECTIVE
Interval HPI:   Overnight events: Patient in room [id number]/[id number] A. Blood glucose worsening. BG above goal on current insulin regimen (Transition Insulin Drip). Steroid use- None. 2 Days Post-Op  Renal function- Normal   Vasopressors-  None       Endocrine will continue to follow and manage insulin orders inpatient.         Diet Cardiac Fluid - 1500mL     Eatin%  Nausea: No  Hypoglycemia and intervention: No  Fever: No  TPN and/or TF: No    PMH, PSH, FH, SH updated and reviewed     ROS:  Review of Systems  Constitutional: Negative for weight changes.  Eyes: Negative for visual disturbance.  Respiratory: Negative for cough.   Cardiovascular: Negative for chest pain.  Gastrointestinal: Negative for nausea.  Endocrine: Negative for polyuria, polydipsia.  Musculoskeletal: Negative for back pain.  Skin: Negative for rash.  Neurological: Negative for syncope.  Psychiatric/Behavioral: Negative for depression.    Current Medications and/or Treatments Impacting Glycemic Control  Immunotherapy:    Immunosuppressants       None          Steroids:   Hormones (From admission, onward)      None          Pressors:    Autonomic Drugs (From admission, onward)      None          Hyperglycemia/Diabetes Medications:   Antihyperglycemics (From admission, onward)      Start     Stop Route Frequency Ordered    23 1130  insulin aspart U-100 pen 4-8 Units         -- SubQ 3 times daily with meals 23 0923 0915  insulin regular in 0.9 % NaCl 100 unit/100 mL (1 unit/mL) infusion        Question:  Enter initial dose (Units/hr):  Answer:  2    -- IV Continuous 23 0907    11/10/23 1855  insulin aspart U-100 pen 0-10 Units         -- SubQ As needed (PRN) 11/10/23 1756             PHYSICAL EXAMINATION:  Vitals:    23 1201   BP:    Pulse: (!) 115   Resp: 20   Temp:      Body mass index is 23.7 kg/m².     Physical Exam     Constitutional: Well developed, well nourished, NAD.  ENT: External ears no masses  with nose patent; normal hearing.  Neck: Supple; trachea midline.  Cardiovascular: LVAD hum, no LE edema.   Lungs: Normal effort; lungs anterior bilaterally clear to auscultation.  Abdomen: Soft, no masses, no hernias.  MS: No clubbing or cyanosis of nails noted;  unable to assess gait.  Skin: No rashes, lesions, or ulcers; no nodules. Injection sites are ok. No lipo hypertropthy or atrophy.  Psychiatric: Good judgement and insight; normal mood and affect.  Neurological: Cranial nerves are grossly intact. Normal vibration sense in the bilateral lower extremities.  Foot: Nails in good condition, no amputations noted.

## 2023-11-11 NOTE — SUBJECTIVE & OBJECTIVE
Interval History: NAOEN. ICD site with pressure dressing CDI. No complaints.     Continuous Infusions:   sodium chloride 0.9% Stopped (11/11/23 1021)    insulin regular 1 units/mL infusion orderable (TRANSFER) 2 Units/hr (11/11/23 1101)    milrinone 20mg/100ml D5W (200mcg/ml) 0.25 mcg/kg/min (11/11/23 1101)     Scheduled Meds:   aspirin  81 mg Oral Daily    atorvastatin  40 mg Oral Daily    ceFEPime (MAXIPIME) IVPB  2 g Intravenous Q8H    furosemide  80 mg Oral Daily    insulin aspart U-100  4-8 Units Subcutaneous TIDWM    levETIRAcetam  1,000 mg Oral BID    magnesium oxide  400 mg Oral Daily    mirtazapine  15 mg Oral Nightly    pantoprazole  40 mg Oral Daily    spironolactone  25 mg Oral Daily    vancomycin (VANCOCIN) IV (PEDS and ADULTS)  1,000 mg Intravenous Q12H     PRN Meds:acetaminophen, dextrose 10%, dextrose 10%, glucagon (human recombinant), glucose, glucose, influenza, insulin aspart U-100, sodium chloride 0.9%, Pharmacy to dose Vancomycin consult **AND** vancomycin - pharmacy to dose    Review of patient's allergies indicates:   Allergen Reactions    Bumex [bumetanide] Hives    Torsemide Hives     Objective:     Vital Signs (Most Recent):  Temp: 98.1 °F (36.7 °C) (11/11/23 0701)  Pulse: (!) 117 (11/11/23 1101)  Resp: (!) 23 (11/11/23 1101)  BP: 118/67 (11/11/23 0801)  SpO2: 96 % (11/11/23 0701) Vital Signs (24h Range):  Temp:  [97.2 °F (36.2 °C)-98.1 °F (36.7 °C)] 98.1 °F (36.7 °C)  Pulse:  [103-131] 117  Resp:  [11-32] 23  SpO2:  [96 %-98 %] 96 %  BP: ()/(0-67) 118/67  Arterial Line BP: ()/(51-88) 99/85     Patient Vitals for the past 72 hrs (Last 3 readings):   Weight   11/11/23 0400 86 kg (189 lb 9.5 oz)   11/10/23 0400 86 kg (189 lb 9.5 oz)   11/09/23 1729 88 kg (194 lb)       Body mass index is 23.7 kg/m².      Intake/Output Summary (Last 24 hours) at 11/11/2023 1131  Last data filed at 11/11/2023 1101  Gross per 24 hour   Intake 2489.34 ml   Output 2210 ml   Net 279.34 ml          Hemodynamic Parameters:          Physical Exam  Constitutional:       Appearance: Normal appearance.   HENT:      Head: Normocephalic and atraumatic.   Neck:      Vascular: JVD present.      Comments: +JVD  Cardiovascular:      Rate and Rhythm: Normal rate and regular rhythm.      Pulses: Normal pulses.      Heart sounds: Normal heart sounds.      Comments: LVAD hum smooth  Pulmonary:      Effort: Pulmonary effort is normal.      Breath sounds: Normal breath sounds.   Chest:      Comments: AICD pocket site in L chest wall covered w/ dressing  Abdominal:      General: Bowel sounds are normal. There is no distension.      Palpations: Abdomen is soft.      Tenderness: There is no abdominal tenderness.   Musculoskeletal:         General: Normal range of motion.      Cervical back: Normal range of motion and neck supple.      Right lower leg: No edema.      Left lower leg: No edema.   Skin:     General: Skin is warm and dry.      Capillary Refill: Capillary refill takes 2 to 3 seconds.   Neurological:      General: No focal deficit present.      Mental Status: He is alert and oriented to person, place, and time.   Psychiatric:         Mood and Affect: Mood normal.         Behavior: Behavior normal.            Significant Labs:  CBC:  Recent Labs   Lab 11/09/23  2039 11/10/23  0359 11/10/23  0608 11/10/23  1219 11/10/23  1753 11/11/23  0442   WBC 12.00 10.32  --   --   --  12.75*   RBC 3.77* 3.32*  --   --   --  3.16*   HGB 8.8* 7.6*   < > 7.1* 6.6* 7.5*   HCT 27.6* 23.9*  --   --   --  23.9*    302  --   --   --  279   MCV 73* 72*  --   --   --  76*   MCH 23.3* 22.9*  --   --   --  23.7*   MCHC 31.9* 31.8*  --   --   --  31.4*    < > = values in this interval not displayed.       BNP:  Recent Labs   Lab 11/08/23  1407   *       CMP:  Recent Labs   Lab 11/09/23  0635 11/09/23  0946 11/10/23  0359 11/11/23  0442   * 216* 315* 260*   CALCIUM 9.9 8.5* 8.9 8.5*   ALBUMIN 3.0*  --  2.8* 2.7*   PROT  7.4  --  6.7 6.4    137 135* 136   K 5.4* 3.3* 4.5 3.3*   CO2 22* 26 27 26    102 99 100   BUN 20 15 19 17   CREATININE 1.5* 1.4 1.4 1.3   ALKPHOS 116  --  109 103   ALT 10  --  7* 8*   AST 31  --  21 17   BILITOT 1.1*  --  1.2* 2.2*        Coagulation:   Recent Labs   Lab 11/09/23  0635 11/10/23  0359 11/11/23  0442   INR 3.1* 2.7* 1.5*   APTT 33.3* 27.5 25.2       LDH:  Recent Labs   Lab 11/09/23  0635 11/10/23  0359 11/11/23  0442   * 278* 278*       Microbiology:  Microbiology Results (last 7 days)       Procedure Component Value Units Date/Time    Aerobic culture [[id number]]  (Abnormal) Collected: 11/09/23 1400    Order Status: Completed Specimen: Incision site from Chest, Left Updated: 11/11/23 1115     Aerobic Bacterial Culture STAPHYLOCOCCUS AUREUS  Rare  Susceptibility pending        STAPHYLOCOCCUS SPECIES  Rare  Identification and susceptibility pending      Narrative:      Deep pocket #1    Aerobic culture [[id number]]  (Abnormal) Collected: 11/09/23 1400    Order Status: Completed Specimen: Incision site from Chest, Left Updated: 11/11/23 1110     Aerobic Bacterial Culture STAPHYLOCOCCUS SPECIES  Rare  Identification and susceptibility pending      Narrative:      Deep pocket #2    Aerobic culture [[id number]]  (Abnormal) Collected: 11/09/23 1400    Order Status: Completed Specimen: Incision site from Chest, Left Updated: 11/11/23 1107     Aerobic Bacterial Culture STAPHYLOCOCCUS SPECIES  Rare  Identification and susceptibility pending      Narrative:      Superficial pocket #1    Aerobic culture [[id number]]  (Abnormal) Collected: 11/09/23 1415    Order Status: Completed Specimen: Incision site from Chest, Left Updated: 11/11/23 1104     Aerobic Bacterial Culture STAPHYLOCOCCUS AUREUS  Rare  Susceptibility pending  Skin ramesh also present      Narrative:      Superior pocket #1    Aerobic culture [[id number]]  (Abnormal) Collected: 11/09/23 1400    Order Status: Completed Specimen:  Incision site from Chest, Left Updated: 11/11/23 0748     Aerobic Bacterial Culture STAPHYLOCOCCUS AUREUS  Rare  Susceptibility pending  Skin ramesh also present      Narrative:      Superficial pocket #2    Blood culture [[id number]] Collected: 11/10/23 0400    Order Status: Completed Specimen: Blood from Peripheral, Wrist, Left Updated: 11/11/23 0613     Blood Culture, Routine No Growth to date      No Growth to date    Blood culture [[id number]] Collected: 11/10/23 0403    Order Status: Completed Specimen: Blood from Peripheral, Wrist, Right Updated: 11/11/23 0613     Blood Culture, Routine No Growth to date      No Growth to date    Blood culture x two cultures. Draw prior to antibiotics. [[id number]] Collected: 11/08/23 1407    Order Status: Completed Specimen: Blood from Peripheral, Lower Arm, Left Updated: 11/10/23 1612     Blood Culture, Routine No Growth to date      No Growth to date      No Growth to date    Narrative:      Aerobic and anaerobic    Blood culture x two cultures. Draw prior to antibiotics. [[id number]] Collected: 11/08/23 1408    Order Status: Completed Specimen: Blood from Peripheral, Antecubital, Left Updated: 11/10/23 1612     Blood Culture, Routine No Growth to date      No Growth to date      No Growth to date    Narrative:      Aerobic and anaerobic    Aerobic culture [[id number]] Collected: 11/09/23 1430    Order Status: Completed Specimen: Incision site from Chest, Left Updated: 11/10/23 0725     Aerobic Bacterial Culture No growth    Narrative:      Lead tip    Culture, Anaerobe [[id number]] Collected: 11/09/23 1400    Order Status: Completed Specimen: Incision site from Chest, Left Updated: 11/10/23 0608     Anaerobic Culture Culture in progress    Narrative:      Superficial pocket #1    Culture, Anaerobe [[id number]] Collected: 11/09/23 1400    Order Status: Completed Specimen: Incision site from Chest, Left Updated: 11/10/23 0608     Anaerobic Culture Culture in progress     Narrative:      Deep pocket #1    Culture, Anaerobe [[id number]] Collected: 11/09/23 1400    Order Status: Completed Specimen: Incision site from Chest, Left Updated: 11/10/23 0608     Anaerobic Culture Culture in progress    Narrative:      Deep pocket #2    Culture, Anaerobe [[id number]] Collected: 11/09/23 1415    Order Status: Completed Specimen: Incision site from Chest, Left Updated: 11/10/23 0608     Anaerobic Culture Culture in progress    Narrative:      Superior pocket #1    Culture, Anaerobe [[id number]] Collected: 11/09/23 1400    Order Status: Completed Specimen: Incision site from Chest, Left Updated: 11/10/23 0608     Anaerobic Culture Culture in progress    Narrative:      Superficial pocket #2    Culture, Anaerobe [[id number]] Collected: 11/09/23 1430    Order Status: Completed Specimen: Incision site from Chest, Left Updated: 11/10/23 0608     Anaerobic Culture Culture in progress    Narrative:      Lead tip            I have reviewed all pertinent labs within the past 24 hours.    Estimated Creatinine Clearance: 92.1 mL/min (based on SCr of 1.3 mg/dL).    Diagnostic Results:  I have reviewed all pertinent imaging results/findings within the past 24 hours.

## 2023-11-11 NOTE — ASSESSMENT & PLAN NOTE
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  -Continue Coumadin, Goal INR 2.0-3.0. subtherapeutic at 1.5 after coumadin held in setting of INR 3.4. INR 2.0 today   -LDH is stable.  Will continue to monitor daily  -Speed set at 5100, LSL 4700 rpm  Review of device function is stable/unstable stable        11/11/2023    11:01 AM 11/11/2023    10:01 AM 11/11/2023     9:01 AM 11/11/2023     8:01 AM 11/11/2023     7:01 AM 11/11/2023     6:01 AM 11/11/2023     5:01 AM   TXP LVAD INTERROGATIONS   Type HeartMate3 HeartMate3 HeartMate3 HeartMate3 HeartMate3 HeartMate3 HeartMate3   Flow  4.2 4.2 4.2 4 3.9 3.9   Speed  5100 5100 5100 5100 5150 5100   PI  4.1 4 4.5 4.4 4.8 5   Power (Serna)  3.6 3.5 3.8 3.6 3.6 3.5   LSL     4700 4700 4700   Pulsatility     Intermittent pulse Intermittent pulse Intermittent pulse

## 2023-11-11 NOTE — CONSULTS
Wound care communicated with Primary RN that gen surgery did not want wound vac change at this time d/t bleeding concerns. Wound care to reassess at a later time.

## 2023-11-11 NOTE — PROGRESS NOTES
Pharmacokinetic Assessment Follow Up: IV Vancomycin    Vancomycin serum concentration assessment(s):    The trough level was drawn correctly and can be used to guide therapy at this time. The measurement is below the desired definitive target range of 15 to 20 mcg/mL.  - The trough level was drawn ~11 hours after previous dose and resulted at 8.8.    Vancomycin Regimen Plan:    Change regimen to Vancomycin 1500 mg IV every 12 hours with next serum trough concentration measured at 0500 prior to 4th dose on 11/13.  - Mr. Queen's Scr has downtrended back towards his baseline. His levels show that he's clearing the vanc well. He continues to make good UOP.    Drug levels (last 3 results):  Recent Labs   Lab Result Units 11/10/23  1533 11/11/23  1621   Vancomycin-Trough ug/mL 8.3* 8.8*       Pharmacy will continue to follow and monitor vancomycin.    Please contact pharmacy at extension [phone] for questions regarding this assessment.    Thank you for the consult,   Suzan Fischer       Patient brief summary:  Kevan Queen is a 38 y.o. male initiated on antimicrobial therapy with IV Vancomycin for treatment of  ICD infection    Actual Body Weight:   86 kg    Renal Function:   Estimated Creatinine Clearance: 99.8 mL/min (based on SCr of 1.2 mg/dL).    Dialysis Method (if applicable):  N/A

## 2023-11-11 NOTE — ASSESSMENT & PLAN NOTE
Managed per primary team  Avoid hypoglycemia  Optimize BG control to improve wound healing  Infection may elevate BG readings  On IV antibiotics

## 2023-11-11 NOTE — PLAN OF CARE
No recent bleeding  Continue pressure dressing  Hgb <7, will give 1u RBC    Connor Gillies, DO, PGY-IV  [facility]

## 2023-11-11 NOTE — PLAN OF CARE
CICU DAILY GOALS       A: Awake    RASS: Goal - RASS Goal: 0-->alert and calm  Actual - RASS (Diggs Agitation-Sedation Scale): alert and calm   Restraint necessity:    B: Breath   SBT: Not intubated   C: Coordinate A & B, analgesics/sedatives   Pain: managed    SAT: Not intubated  D: Delirium   CAM-ICU: Overall CAM-ICU: Negative  E: Early(intubated/ Progressive (non-intubated) Mobility   MOVE Screen: Pass   Activity: Activity Management: Up in chair - L3  FAS: Feeding/Nutrition   Diet order: Diet/Nutrition Received: 2 gram sodium, low saturated fat/low cholesterol, restrict fluids,   Fluid restriction: Fluid Requirement: 1500 cc FR   Nutritional Supplement Intake: Quantity 0, Type: Boost  T: Thrombus   DVT prophylaxis: VTE Required Core Measure: Pharmacological prophylaxis initiated/maintained  H: HOB Elevation   Head of Bed (HOB) Positioning: HOB elevated  U: Ulcer Prophylaxis   GI: yes  G: Glucose control   uncontrolled Glycemic Management: blood glucose monitored  S: Skin   Bundle compliance: yes   Bathing/Skin Care: back care, bath, complete, dressed/undressed, electrode patches/site rotation, foot care, linen changed Date: 11 10 23  B: Bowel Function   no issues   I: Indwelling Catheters   Cartwright necessity:     CVC necessity: No   IPAD offered: No  D: De-escalation Antibx   Yes  Plan for the day   Monitor hemoglobin; fluid restriction  Family/Goals of care/Code Status   Code Status: Full Code     No acute events throughout day, VS and assessment per flow sheet, patient progressing towards goals as tolerated, plan of care reviewed with Kevan Queen and family, all concerns addressed, will continue to monitor.

## 2023-11-11 NOTE — CONSULTS
[facility]  Endocrinology  Diabetes Consult Note    Consult Requested by: Josh Ryan, *   Reason for admit: ICD (implantable cardioverter-defibrillator) malfunction    HISTORY OF PRESENT ILLNESS:  Reason for Consult: Management of T2DM, Hyperglycemia      Surgical Procedure and Date: LVAD 2022     Diabetes diagnosis year:      Home Diabetes Medications:   -Levemir 8 units BID.   -Novolog 6 units TIDWM in addition to the following correction scale:     150 - 200 + 1 unit     201 - 250 + 2 units     251 - 300 + 3 units     301 - 350 + 4 units      > 350   + 5 units     How often checking glucose at home?  > 4 times per day  BG readings on regimen: 180's-200's  Hypoglycemia on the regimen?  No  Missed doses on regimen?  occasionally skips mealsn and will skip Novolog with breakfast      Diabetes Complications include:     Hyperglycemia     Complicating diabetes co morbidities:   History of MI, CHF, and CKD        HPI: Mr. Queen is a 37yo male with HF (EF 10-15%), s/p LVAD placement with HeartMate III, ICD placement 2022, DM2, epilepsy who presented as his ICD began to erode through his skin and was exposed. BG excursions noted in the 400's yesterday evening. Endocrine consulted to manage hyperglycemia and type 2 diabetes.     Lab Results   Component Value Date    HGBA1C 6.9 (H) 2023         Interval HPI:   Overnight events: Patient in room [id number] A. Blood glucose worsening. BG above goal on current insulin regimen (Transition Insulin Drip). Steroid use- None. 2 Days Post-Op  Renal function- Normal   Vasopressors-  None       Endocrine will continue to follow and manage insulin orders inpatient.         Diet Cardiac Fluid - 1500mL     Eatin%  Nausea: No  Hypoglycemia and intervention: No  Fever: No  TPN and/or TF: No    PMH, PSH, FH, SH updated and reviewed     ROS:  Review of Systems  Constitutional: Negative for weight changes.  Eyes: Negative for  visual disturbance.  Respiratory: Negative for cough.   Cardiovascular: Negative for chest pain.  Gastrointestinal: Negative for nausea.  Endocrine: Negative for polyuria, polydipsia.  Musculoskeletal: Negative for back pain.  Skin: Negative for rash.  Neurological: Negative for syncope.  Psychiatric/Behavioral: Negative for depression.    Current Medications and/or Treatments Impacting Glycemic Control  Immunotherapy:    Immunosuppressants       None          Steroids:   Hormones (From admission, onward)      None          Pressors:    Autonomic Drugs (From admission, onward)      None          Hyperglycemia/Diabetes Medications:   Antihyperglycemics (From admission, onward)      Start     Stop Route Frequency Ordered    11/11/23 1130  insulin aspart U-100 pen 4-8 Units         -- SubQ 3 times daily with meals 11/11/23 0907 11/11/23 0915  insulin regular in 0.9 % NaCl 100 unit/100 mL (1 unit/mL) infusion        Question:  Enter initial dose (Units/hr):  Answer:  2    -- IV Continuous 11/11/23 0907    11/10/23 1855  insulin aspart U-100 pen 0-10 Units         -- SubQ As needed (PRN) 11/10/23 1756             PHYSICAL EXAMINATION:  Vitals:    11/11/23 1201   BP:    Pulse: (!) 115   Resp: 20   Temp:      Body mass index is 23.7 kg/m².     Physical Exam     Constitutional: Well developed, well nourished, NAD.  ENT: External ears no masses with nose patent; normal hearing.  Neck: Supple; trachea midline.  Cardiovascular: LVAD hum, no LE edema.   Lungs: Normal effort; lungs anterior bilaterally clear to auscultation.  Abdomen: Soft, no masses, no hernias.  MS: No clubbing or cyanosis of nails noted;  unable to assess gait.  Skin: No rashes, lesions, or ulcers; no nodules. Injection sites are ok. No lipo hypertropthy or atrophy.  Psychiatric: Good judgement and insight; normal mood and affect.  Neurological: Cranial nerves are grossly intact. Normal vibration sense in the bilateral lower extremities.  Foot: Nails in  "good condition, no amputations noted.      Labs Reviewed and Include   Recent Labs   Lab 11/11/23  0442   *   CALCIUM 8.5*   ALBUMIN 2.7*   PROT 6.4      K 3.3*   CO2 26      BUN 17   CREATININE 1.3   ALKPHOS 103   ALT 8*   AST 17   BILITOT 2.2*     Lab Results   Component Value Date    WBC 12.75 (H) 11/11/2023    HGB 7.5 (L) 11/11/2023    HCT 23.9 (L) 11/11/2023    MCV 76 (L) 11/11/2023     11/11/2023     No results for input(s): "TSH", "FREET4" in the last 168 hours.  Lab Results   Component Value Date    HGBA1C 6.9 (H) 08/31/2023       Nutritional status:   Body mass index is 23.7 kg/m².  Lab Results   Component Value Date    ALBUMIN 2.7 (L) 11/11/2023    ALBUMIN 2.8 (L) 11/10/2023    ALBUMIN 3.0 (L) 11/09/2023     Lab Results   Component Value Date    PREALBUMIN 15 (L) 08/07/2023    PREALBUMIN 21 08/04/2023    PREALBUMIN 29 08/02/2023       Estimated Creatinine Clearance: 92.1 mL/min (based on SCr of 1.3 mg/dL).    Accu-Checks  Recent Labs     11/10/23  0607 11/10/23  0836 11/10/23  1151 11/10/23  1647 11/10/23  1649 11/10/23  2134 11/10/23  2356 11/11/23  0441 11/11/23  0810 11/11/23  1158   POCTGLUCOSE 341* 316* 333* 422* 408* 428* 333* 268* 339* 272*        ASSESSMENT and PLAN    Cardiac/Vascular  * ICD (implantable cardioverter-defibrillator) malfunction  Managed per primary team  Avoid hypoglycemia  Optimize BG control to improve wound healing  Infection may elevate BG readings  On IV antibiotics          LVAD (left ventricular assist device) present  Managed per primary team  Avoid hypoglycemia        Endocrine  Type 2 diabetes mellitus with hyperglycemia  Endocrinology consulted for BG management.   BG goal 140-180     WBD 0.5 units/kg/day (initial)  - Transition drip at 2 units/hr with step-down parameters. (70% increase due to fasting blood glucose above goal)   - Novolog (aspart) insulin 4-8 Units SQ TIDWM and prn for BG excursions [facility] SSI (150/25).  - BG checks q4hr until BG " excursions resolve.   - Hypoglycemia protocol in place    ** Please notify Endocrine for any change and/or advance in diet**  ** Please call Endocrine for any BG related issues **    Discharge Planning:   TBD. Please notify endocrinology prior to discharge.            Plan discussed with patient, family, and RN at bedside.        Michael Wyman, DNP, FNP  Endocrinology  [facility]

## 2023-11-11 NOTE — PLAN OF CARE
CICU DAILY GOALS       A: Awake    RASS: Goal - RASS Goal: 0-->alert and calm  Actual - RASS (Diggs Agitation-Sedation Scale): alert and calm   Restraint necessity:    B: Breath   SBT: Not intubated   C: Coordinate A & B, analgesics/sedatives   Pain: managed    SAT: Not intubated  D: Delirium   CAM-ICU: Overall CAM-ICU: Negative  E: Early(intubated/ Progressive (non-intubated) Mobility   MOVE Screen: Pass   Activity: Activity Management: Ambulated in room - L4  FAS: Feeding/Nutrition   Diet order: Diet/Nutrition Received: 2 gram sodium, low saturated fat/low cholesterol, restrict fluids,   Fluid restriction: Fluid Requirement: 1500 cc FR   Nutritional Supplement Intake: Quantity 0, Type:  n/a  T: Thrombus   DVT prophylaxis: VTE Required Core Measure: Pharmacological prophylaxis initiated/maintained  H: HOB Elevation   Head of Bed (HOB) Positioning: HOB lowered  U: Ulcer Prophylaxis   GI: no  G: Glucose control   managed Glycemic Management: blood glucose monitored  S: Skin   Bundle compliance: yes   Bathing/Skin Care: back care, bath, complete, dressed/undressed, electrode patches/site rotation, foot care, linen changed Date: 11/10  B: Bowel Function   no issues   I: Indwelling Catheters   Cartwright necessity:     CVC necessity: No   IPAD offered: Not appropriate  D: De-escalation Antibx   Yes    Patient remains on antibiotics and milrinone at .25; Dopplers today were 82/0, 78/0, and 80/0    Patient's subclavian dressing saturated in the morning. DEYA Cope notified and MD Jordyn from surgery notified and at bedside to re pack the dressing. Patient did not bleed through the dressing for the rest of the day.    Patient's blood sugar in the 300s throughout shift despite insulin aspart coverage. DEYA Cope notified and plans to go to moderate dose sliding scale. Inpatient endocrinology consulted as well.       Family/Goals of care/Code Status   Code Status: Full Code     No acute events throughout day, VS and  assessment per flow sheet, patient progressing towards goals as tolerated, plan of care reviewed with Kevan Queen and family, all concerns addressed, will continue to monitor.

## 2023-11-11 NOTE — SUBJECTIVE & OBJECTIVE
Interval History: No bleeding from ICD pocket overnight after being packed with combat gauze yesterday. Dressing taken down this morning and hemostatic, re-packed.    Medications:  Continuous Infusions:   sodium chloride 0.9%      insulin regular 1 units/mL infusion orderable (TRANSFER)      milrinone 20mg/100ml D5W (200mcg/ml) 0.25 mcg/kg/min (11/11/23 0901)     Scheduled Meds:   aspirin  81 mg Oral Daily    atorvastatin  40 mg Oral Daily    ceFEPime (MAXIPIME) IVPB  2 g Intravenous Q8H    furosemide  80 mg Oral Daily    insulin aspart U-100  4-8 Units Subcutaneous TIDWM    levETIRAcetam  1,000 mg Oral BID    magnesium oxide  400 mg Oral Daily    mirtazapine  15 mg Oral Nightly    pantoprazole  40 mg Oral Daily    spironolactone  25 mg Oral Daily    vancomycin (VANCOCIN) IV (PEDS and ADULTS)  1,000 mg Intravenous Q12H     PRN Meds:acetaminophen, dextrose 10%, dextrose 10%, glucagon (human recombinant), glucose, glucose, insulin aspart U-100, sodium chloride 0.9%, Pharmacy to dose Vancomycin consult **AND** vancomycin - pharmacy to dose     Review of patient's allergies indicates:   Allergen Reactions    Bumex [bumetanide] Hives    Torsemide Hives     Objective:     Vital Signs (Most Recent):  Temp: 98.1 °F (36.7 °C) (11/11/23 0701)  Pulse: (!) 112 (11/11/23 0901)  Resp: 19 (11/11/23 0901)  BP: 118/67 (11/11/23 0801)  SpO2: 96 % (11/11/23 0701) Vital Signs (24h Range):  Temp:  [97.2 °F (36.2 °C)-98.1 °F (36.7 °C)] 98.1 °F (36.7 °C)  Pulse:  [103-131] 112  Resp:  [11-32] 19  SpO2:  [96 %-98 %] 96 %  BP: ()/(0-67) 118/67  Arterial Line BP: ()/(51-88) 93/75     Weight: 86 kg (189 lb 9.5 oz)  Body mass index is 23.7 kg/m².    Intake/Output - Last 3 Shifts         11/09 0700  11/10 0659 11/10 0700  11/11 0659 11/11 0700  11/12 0659    P.O. 950 1080 420    I.V. (mL/kg) 255.6 (3) 278.1 (3.2) 123 (1.4)    Blood  350     IV Piggyback 802.8 702.1 177.1    Total Intake(mL/kg) 2008.4 (23.4) 2410.3 (28) 720.1 (8.4)     Urine (mL/kg/hr) 3000 (1.5) 2310 (1.1) 350 (1.4)    Other 600      Total Output 3600 2310 350    Net -1591.6 +100.3 +370.1                    Physical Exam  Constitutional:       Appearance: Normal appearance.   HENT:      Head: Normocephalic and atraumatic.   Cardiovascular:      Rate and Rhythm: Tachycardia present.   Pulmonary:      Comments: L chest ICD pocket with no active bleeding, clean and dry.  Neurological:      Mental Status: He is alert.          Significant Labs:  I have reviewed all pertinent lab results within the past 24 hours.  CBC:   Recent Labs   Lab 11/11/23 0442   WBC 12.75*   RBC 3.16*   HGB 7.5*   HCT 23.9*      MCV 76*   MCH 23.7*   MCHC 31.4*     BMP:   Recent Labs   Lab 11/11/23 0442   *      K 3.3*      CO2 26   BUN 17   CREATININE 1.3   CALCIUM 8.5*   MG 1.8       Significant Diagnostics:  I have reviewed all pertinent imaging results/findings within the past 24 hours.

## 2023-11-11 NOTE — ASSESSMENT & PLAN NOTE
Patient presented with Hashdoc AICD falling out of L chest wall. Apparently had been this way for 1 week  Unclear reason for dehiscence of ICD site  BCx's pending. Continue cefepime and Vanc  EP extracted his device, complicated by hematoma so wound vac removed. Hemostasis achieved with general surgery help. Will continue to monitor no interventions planned at the moment.

## 2023-11-11 NOTE — NURSING
Notified Gillies MD of recent spike in MAP readings ranging 88-94. Last doppler reading was 84 at approx 0400. No new orders at this time. Day team will address upon rounds per Gillies MD. New orders for potassium and magnesium replacement. Pt received 1 unit of RBCs; awaiting recent hemoglobin.

## 2023-11-11 NOTE — PROGRESS NOTES
[facility]  General Surgery  Progress Note    Subjective:     History of Present Illness:   Mr. Queen is a 37yo male with HF (EF 10-15%), s/p LVAD placement with HeartMate III, ICD placement 1/2022, DM2, epilepsy who presented as his ICD began to erode through his skin and was exposed.  He went earlier today for ICD removal by EP and wound vac placement.  Post operatively in PACU it was noted that he was having swelling at his wound vac site as well as the wound vac was having occlusion alarms.  Given these findings general surgery was called to come evaluate the wound vac by cardiology and the hospital medicine service.  On evaluation patient has a significantly swollen left chest.  The wound vac at this time had around 600cc of sanguinous output collected.  Given the swelling and wound vac not working the decision was made to take down the wound vac dressing to better assess.  Immediately found a large hematoma about the size of a baseball that was evacuated from his prior ICD pocket.  Initially it appeared to be muscle oozing and an attempt at placing nu knit was done along with pressure and a new gauze dressing.  However this was unfortunately not satisfactory and the dressing was saturated shortly after secondary to significant continued bleeding.  The dressing was removed and the ICD pocket was further evaluated.  After further evaluation a couple vessels were identified that were injured and had active bleeding resulting in rapid pooling of blood within his ICD pocket.  Discussed with patient and elected to attempt suture ligation of these bleeding vessels with figure of 8 stitches.  The most significant bleeding after this appeared to be controlled and what was left seen appeared to be muscle wall oozing.  Tereso was sprayed in the area and a new nu knit was placed over this.  The pocket was again packed with gauze and a pressure dressing was placed over the area.  The patient was  checked on again a couple hours later and the dressing appears to be holding without signs of significant bleeding.  This was discussed with the hospital medicine team as well as cardiology.  The EP attending was at bedside as well during portions of the exam/procedure.     Of note patient is on coumadin for his HeartMate III LVAD and reversing his anticoagulation would pose a significant risk for thrombosis of this and deemed to high of a risk to do.    Post-Op Info:  Procedure(s) (LRB):  EXTRACTION, ELECTRODE LEAD (Left)  Transesophageal echo (GUILLERMO) intra-procedure log documentation  REVISION, SKIN POCKET, FOR CARDIOVERTER-DEFIBRILLATOR   2 Days Post-Op     Interval History: No bleeding from ICD pocket overnight after being packed with combat gauze yesterday. Dressing taken down this morning and hemostatic, re-packed.    Medications:  Continuous Infusions:   sodium chloride 0.9%      insulin regular 1 units/mL infusion orderable (TRANSFER)      milrinone 20mg/100ml D5W (200mcg/ml) 0.25 mcg/kg/min (11/11/23 0901)     Scheduled Meds:   aspirin  81 mg Oral Daily    atorvastatin  40 mg Oral Daily    ceFEPime (MAXIPIME) IVPB  2 g Intravenous Q8H    furosemide  80 mg Oral Daily    insulin aspart U-100  4-8 Units Subcutaneous TIDWM    levETIRAcetam  1,000 mg Oral BID    magnesium oxide  400 mg Oral Daily    mirtazapine  15 mg Oral Nightly    pantoprazole  40 mg Oral Daily    spironolactone  25 mg Oral Daily    vancomycin (VANCOCIN) IV (PEDS and ADULTS)  1,000 mg Intravenous Q12H     PRN Meds:acetaminophen, dextrose 10%, dextrose 10%, glucagon (human recombinant), glucose, glucose, insulin aspart U-100, sodium chloride 0.9%, Pharmacy to dose Vancomycin consult **AND** vancomycin - pharmacy to dose     Review of patient's allergies indicates:   Allergen Reactions    Bumex [bumetanide] Hives    Torsemide Hives     Objective:     Vital Signs (Most Recent):  Temp: 98.1 °F (36.7 °C) (11/11/23 0701)  Pulse: (!) 112 (11/11/23  0901)  Resp: 19 (11/11/23 0901)  BP: 118/67 (11/11/23 0801)  SpO2: 96 % (11/11/23 0701) Vital Signs (24h Range):  Temp:  [97.2 °F (36.2 °C)-98.1 °F (36.7 °C)] 98.1 °F (36.7 °C)  Pulse:  [103-131] 112  Resp:  [11-32] 19  SpO2:  [96 %-98 %] 96 %  BP: ()/(0-67) 118/67  Arterial Line BP: ()/(51-88) 93/75     Weight: 86 kg (189 lb 9.5 oz)  Body mass index is 23.7 kg/m².    Intake/Output - Last 3 Shifts         11/09 0700  11/10 0659 11/10 0700  11/11 0659 11/11 0700 11/12 0659    P.O. 950 1080 420    I.V. (mL/kg) 255.6 (3) 278.1 (3.2) 123 (1.4)    Blood  350     IV Piggyback 802.8 702.1 177.1    Total Intake(mL/kg) 2008.4 (23.4) 2410.3 (28) 720.1 (8.4)    Urine (mL/kg/hr) 3000 (1.5) 2310 (1.1) 350 (1.4)    Other 600      Total Output 3600 2310 350    Net -1591.6 +100.3 +370.1                    Physical Exam  Constitutional:       Appearance: Normal appearance.   HENT:      Head: Normocephalic and atraumatic.   Cardiovascular:      Rate and Rhythm: Tachycardia present.   Pulmonary:      Comments: L chest ICD pocket with no active bleeding, clean and dry.  Neurological:      Mental Status: He is alert.          Significant Labs:  I have reviewed all pertinent lab results within the past 24 hours.  CBC:   Recent Labs   Lab 11/11/23 0442   WBC 12.75*   RBC 3.16*   HGB 7.5*   HCT 23.9*      MCV 76*   MCH 23.7*   MCHC 31.4*     BMP:   Recent Labs   Lab 11/11/23 0442   *      K 3.3*      CO2 26   BUN 17   CREATININE 1.3   CALCIUM 8.5*   MG 1.8       Significant Diagnostics:  I have reviewed all pertinent imaging results/findings within the past 24 hours.  Assessment/Plan:     ICD (implantable cardioverter-defibrillator) infection  Mr. Queen is a 39yo male who underwent an ICD removal 11/9 secondary to it eroding through the skin.  Post procedure he was noted to have significant bleeding from some bleeding vessels as well as likely confounded with his coagulopathy from his coumadin  which he takes for his LVAD. S/p suture ligation of bleeding vessels and application of hemostatic agents with pressure dressing in PACU on 11/9     - dressing changed at bedside, no signs of active bleeding; repacked with quick clot gauze  - now hemostatic for 24hrs; remove gauze tomorrow and can re-pack with wet-to-dry or per primary team that removed the ICD; can consider consult to wound care for further dressing changes  - general surgery will sign off at this time, please call with questions        Jordyn Maria MD  General Surgery  [facility]

## 2023-11-11 NOTE — ASSESSMENT & PLAN NOTE
Mr. Queen is a 39yo male who underwent an ICD removal 11/9 secondary to it eroding through the skin.  Post procedure he was noted to have significant bleeding from some bleeding vessels as well as likely confounded with his coagulopathy from his coumadin which he takes for his LVAD. S/p suture ligation of bleeding vessels and application of hemostatic agents with pressure dressing in PACU on 11/9     - dressing changed at bedside, no signs of active bleeding; repacked with quick clot gauze  - now hemostatic for 24hrs; remove gauze tomorrow and can re-pack with wet-to-dry or per primary team that removed the ICD; can consider consult to wound care for further dressing changes  - general surgery will sign off at this time, please call with questions

## 2023-11-11 NOTE — NURSING TRANSFER
Nursing Transfer Note      11/11/2023   2:00 PM    Nurse giving handoff:Sydnie Sequeira   Nurse receiving handoff:Jane    Reason patient is being transferred: Stepdown    Transfer To: [id number]    Transfer via wheelchair    Transfer with cardiac monitoring, personal items, VAD equipment    Transported by RN      Telemetry: Rhythm ST  Order for Tele Monitor? Yes    Additional Lines: n/a    4eyes on Skin: yes    Medicines sent: current infusions, insulin pens    Any special needs or follow-up needed: VAD    Patient belongings transferred with patient: Yes    Chart send with patient: Yes    Notified: family    Patient reassessed at:  (date, time)  1  Upon arrival to floor: cardiac monitor applied, patient oriented to room, call bell in reach, and bed in lowest position

## 2023-11-11 NOTE — HPI
Reason for Consult: Management of T2DM, Hyperglycemia      Surgical Procedure and Date: LVAD 06/29/2022     Diabetes diagnosis year: 2022     Home Diabetes Medications:   -Levemir 8 units BID.   -Novolog 6 units TIDWM in addition to the following correction scale:     150 - 200 + 1 unit     201 - 250 + 2 units     251 - 300 + 3 units     301 - 350 + 4 units      > 350   + 5 units     How often checking glucose at home?  > 4 times per day  BG readings on regimen: 180's-200's  Hypoglycemia on the regimen?  No  Missed doses on regimen?  occasionally skips mealsn and will skip Novolog with breakfast      Diabetes Complications include:     Hyperglycemia     Complicating diabetes co morbidities:   History of MI, CHF, and CKD        HPI: Mr. Queen is a 37yo male with HF (EF 10-15%), s/p LVAD placement with HeartMate III, ICD placement 1/2022, DM2, epilepsy who presented as his ICD began to erode through his skin and was exposed. BG excursions noted in the 400's yesterday evening. Endocrine consulted to manage hyperglycemia and type 2 diabetes.     Lab Results   Component Value Date    HGBA1C 6.9 (H) 08/31/2023

## 2023-11-11 NOTE — PROGRESS NOTES
Pharmacokinetic Assessment Follow Up: IV Vancomycin    Vancomycin serum concentration assessment(s):    The trough level was drawn incorrectly and cannot be used to guide therapy at this time.  - While the level itself was drawn correctly ~12 hours after previous dose, there were missing doses prior that created an inaccurate level.  - He received his LD of 2000 mg on 11/8 @1622. Vanc consult was ordered on 11/9 @1500. The only vancomycin given on 11/9 was vanc to the sterile field during surgery. A dose of IV vanc was scheduled on 11/9 @ 1914, but it was charted against and not given, saying vanc was given in surgery too soon before the dose was due. However, since that surgical site vanc wasn't systemic, it doesn't really contribute to the vancomycin level for systemic vanc.   - As a result, there was no systemic vanc given on 11/9. The next dose of 1000 mg was given on 11/10 @0448. Because of this, the 8.3 level from today is not accurate since it's basically giving us the level after only one dose.     Vancomycin Regimen Plan:    Continue regimen to Vancomycin 1000 mg IV every 12 hours with next serum trough concentration measured at 1530 on 11/11.  - Mr. Queen's renal function appears stable today. His Scr still appears elevated above his baseline, but his level shows he's still clearing well. Will continue to watch closely.   - Please draw random level sooner than scheduled trough if renal function changes significantly.    Drug levels (last 3 results):  Recent Labs   Lab Result Units 11/10/23  1533   Vancomycin-Trough ug/mL 8.3*       Pharmacy will continue to follow and monitor vancomycin.    Please contact pharmacy at extension [phone] for questions regarding this assessment.    Thank you for the consult,   Suzan Fischer       Patient brief summary:  Kevan Queen is a 38 y.o. male initiated on antimicrobial therapy with IV Vancomycin for treatment of  infected ICD    Actual Body Weight:   86 kg    Renal  Function:   Estimated Creatinine Clearance: 85.5 mL/min (based on SCr of 1.4 mg/dL).     Dialysis Method (if applicable):  N/A

## 2023-11-11 NOTE — ASSESSMENT & PLAN NOTE
Endocrinology consulted for BG management.   BG goal 140-180     WBD 0.5 units/kg/day (initial)  - Transition drip at 2 units/hr with step-down parameters. (70% increase due to fasting blood glucose above goal)   - Novolog (aspart) insulin 4-8 Units SQ TIDWM and prn for BG excursions [facility] SSI (150/25).  - BG checks q4hr until BG excursions resolve.   - Hypoglycemia protocol in place    ** Please notify Endocrine for any change and/or advance in diet**  ** Please call Endocrine for any BG related issues **    Discharge Planning:   TBD. Please notify endocrinology prior to discharge.

## 2023-11-11 NOTE — PROGRESS NOTES
[facility] - Cardiac [facility]  Heart Transplant  Progress Note    Patient Name: Kevan Queen  MRN: [id number]  Admission Date: 11/8/2023  Hospital Length of Stay: 3 days  Attending Physician: Josh Ryan, *  Primary Care Provider: Rosio Armendariz FNP  Principal Problem:ICD (implantable cardioverter-defibrillator) malfunction    Subjective:     Interval History: NAOEN. ICD site with pressure dressing CDI. No complaints.     Continuous Infusions:   sodium chloride 0.9% Stopped (11/11/23 1021)    insulin regular 1 units/mL infusion orderable (TRANSFER) 2 Units/hr (11/11/23 1101)    milrinone 20mg/100ml D5W (200mcg/ml) 0.25 mcg/kg/min (11/11/23 1101)     Scheduled Meds:   aspirin  81 mg Oral Daily    atorvastatin  40 mg Oral Daily    ceFEPime (MAXIPIME) IVPB  2 g Intravenous Q8H    furosemide  80 mg Oral Daily    insulin aspart U-100  4-8 Units Subcutaneous TIDWM    levETIRAcetam  1,000 mg Oral BID    magnesium oxide  400 mg Oral Daily    mirtazapine  15 mg Oral Nightly    pantoprazole  40 mg Oral Daily    spironolactone  25 mg Oral Daily    vancomycin (VANCOCIN) IV (PEDS and ADULTS)  1,000 mg Intravenous Q12H     PRN Meds:acetaminophen, dextrose 10%, dextrose 10%, glucagon (human recombinant), glucose, glucose, influenza, insulin aspart U-100, sodium chloride 0.9%, Pharmacy to dose Vancomycin consult **AND** vancomycin - pharmacy to dose    Review of patient's allergies indicates:   Allergen Reactions    Bumex [bumetanide] Hives    Torsemide Hives     Objective:     Vital Signs (Most Recent):  Temp: 98.1 °F (36.7 °C) (11/11/23 0701)  Pulse: (!) 117 (11/11/23 1101)  Resp: (!) 23 (11/11/23 1101)  BP: 118/67 (11/11/23 0801)  SpO2: 96 % (11/11/23 0701) Vital Signs (24h Range):  Temp:  [97.2 °F (36.2 °C)-98.1 °F (36.7 °C)] 98.1 °F (36.7 °C)  Pulse:  [103-131] 117  Resp:  [11-32] 23  SpO2:  [96 %-98 %] 96 %  BP: ()/(0-67) 118/67  Arterial Line BP: ()/(51-88) 99/85     Patient Vitals  for the past 72 hrs (Last 3 readings):   Weight   11/11/23 0400 86 kg (189 lb 9.5 oz)   11/10/23 0400 86 kg (189 lb 9.5 oz)   11/09/23 1729 88 kg (194 lb)       Body mass index is 23.7 kg/m².      Intake/Output Summary (Last 24 hours) at 11/11/2023 1131  Last data filed at 11/11/2023 1101  Gross per 24 hour   Intake 2489.34 ml   Output 2210 ml   Net 279.34 ml         Hemodynamic Parameters:          Physical Exam  Constitutional:       Appearance: Normal appearance.   HENT:      Head: Normocephalic and atraumatic.   Neck:      Vascular: JVD present.      Comments: +JVD  Cardiovascular:      Rate and Rhythm: Normal rate and regular rhythm.      Pulses: Normal pulses.      Heart sounds: Normal heart sounds.      Comments: LVAD hum smooth  Pulmonary:      Effort: Pulmonary effort is normal.      Breath sounds: Normal breath sounds.   Chest:      Comments: AICD pocket site in L chest wall covered w/ dressing  Abdominal:      General: Bowel sounds are normal. There is no distension.      Palpations: Abdomen is soft.      Tenderness: There is no abdominal tenderness.   Musculoskeletal:         General: Normal range of motion.      Cervical back: Normal range of motion and neck supple.      Right lower leg: No edema.      Left lower leg: No edema.   Skin:     General: Skin is warm and dry.      Capillary Refill: Capillary refill takes 2 to 3 seconds.   Neurological:      General: No focal deficit present.      Mental Status: He is alert and oriented to person, place, and time.   Psychiatric:         Mood and Affect: Mood normal.         Behavior: Behavior normal.            Significant Labs:  CBC:  Recent Labs   Lab 11/09/23  2039 11/10/23  0359 11/10/23  0608 11/10/23  1219 11/10/23  1753 11/11/23  0442   WBC 12.00 10.32  --   --   --  12.75*   RBC 3.77* 3.32*  --   --   --  3.16*   HGB 8.8* 7.6*   < > 7.1* 6.6* 7.5*   HCT 27.6* 23.9*  --   --   --  23.9*    302  --   --   --  279   MCV 73* 72*  --   --   --  76*    MCH 23.3* 22.9*  --   --   --  23.7*   MCHC 31.9* 31.8*  --   --   --  31.4*    < > = values in this interval not displayed.       BNP:  Recent Labs   Lab 11/08/23  1407   *       CMP:  Recent Labs   Lab 11/09/23  0635 11/09/23  0946 11/10/23  0359 11/11/23 0442   * 216* 315* 260*   CALCIUM 9.9 8.5* 8.9 8.5*   ALBUMIN 3.0*  --  2.8* 2.7*   PROT 7.4  --  6.7 6.4    137 135* 136   K 5.4* 3.3* 4.5 3.3*   CO2 22* 26 27 26    102 99 100   BUN 20 15 19 17   CREATININE 1.5* 1.4 1.4 1.3   ALKPHOS 116  --  109 103   ALT 10  --  7* 8*   AST 31  --  21 17   BILITOT 1.1*  --  1.2* 2.2*        Coagulation:   Recent Labs   Lab 11/09/23  0635 11/10/23  0359 11/11/23 0442   INR 3.1* 2.7* 1.5*   APTT 33.3* 27.5 25.2       LDH:  Recent Labs   Lab 11/09/23  0635 11/10/23  0359 11/11/23 0442   * 278* 278*       Microbiology:  Microbiology Results (last 7 days)       Procedure Component Value Units Date/Time    Aerobic culture [[id number]]  (Abnormal) Collected: 11/09/23 1400    Order Status: Completed Specimen: Incision site from Chest, Left Updated: 11/11/23 1115     Aerobic Bacterial Culture STAPHYLOCOCCUS AUREUS  Rare  Susceptibility pending        STAPHYLOCOCCUS SPECIES  Rare  Identification and susceptibility pending      Narrative:      Deep pocket #1    Aerobic culture [[id number]]  (Abnormal) Collected: 11/09/23 1400    Order Status: Completed Specimen: Incision site from Chest, Left Updated: 11/11/23 1110     Aerobic Bacterial Culture STAPHYLOCOCCUS SPECIES  Rare  Identification and susceptibility pending      Narrative:      Deep pocket #2    Aerobic culture [[id number]]  (Abnormal) Collected: 11/09/23 1400    Order Status: Completed Specimen: Incision site from Chest, Left Updated: 11/11/23 1107     Aerobic Bacterial Culture STAPHYLOCOCCUS SPECIES  Rare  Identification and susceptibility pending      Narrative:      Superficial pocket #1    Aerobic culture [[id number]]  (Abnormal)  Collected: 11/09/23 1415    Order Status: Completed Specimen: Incision site from Chest, Left Updated: 11/11/23 1104     Aerobic Bacterial Culture STAPHYLOCOCCUS AUREUS  Rare  Susceptibility pending  Skin ramesh also present      Narrative:      Superior pocket #1    Aerobic culture [[id number]]  (Abnormal) Collected: 11/09/23 1400    Order Status: Completed Specimen: Incision site from Chest, Left Updated: 11/11/23 0748     Aerobic Bacterial Culture STAPHYLOCOCCUS AUREUS  Rare  Susceptibility pending  Skin ramesh also present      Narrative:      Superficial pocket #2    Blood culture [[id number]] Collected: 11/10/23 0400    Order Status: Completed Specimen: Blood from Peripheral, Wrist, Left Updated: 11/11/23 0613     Blood Culture, Routine No Growth to date      No Growth to date    Blood culture [[id number]] Collected: 11/10/23 0403    Order Status: Completed Specimen: Blood from Peripheral, Wrist, Right Updated: 11/11/23 0613     Blood Culture, Routine No Growth to date      No Growth to date    Blood culture x two cultures. Draw prior to antibiotics. [[id number]] Collected: 11/08/23 1407    Order Status: Completed Specimen: Blood from Peripheral, Lower Arm, Left Updated: 11/10/23 1612     Blood Culture, Routine No Growth to date      No Growth to date      No Growth to date    Narrative:      Aerobic and anaerobic    Blood culture x two cultures. Draw prior to antibiotics. [[id number]] Collected: 11/08/23 1408    Order Status: Completed Specimen: Blood from Peripheral, Antecubital, Left Updated: 11/10/23 1612     Blood Culture, Routine No Growth to date      No Growth to date      No Growth to date    Narrative:      Aerobic and anaerobic    Aerobic culture [[id number]] Collected: 11/09/23 1430    Order Status: Completed Specimen: Incision site from Chest, Left Updated: 11/10/23 0725     Aerobic Bacterial Culture No growth    Narrative:      Lead tip    Culture, Anaerobe [[id number]] Collected: 11/09/23 1400     Order Status: Completed Specimen: Incision site from Chest, Left Updated: 11/10/23 0608     Anaerobic Culture Culture in progress    Narrative:      Superficial pocket #1    Culture, Anaerobe [[id number]] Collected: 11/09/23 1400    Order Status: Completed Specimen: Incision site from Chest, Left Updated: 11/10/23 0608     Anaerobic Culture Culture in progress    Narrative:      Deep pocket #1    Culture, Anaerobe [[id number]] Collected: 11/09/23 1400    Order Status: Completed Specimen: Incision site from Chest, Left Updated: 11/10/23 0608     Anaerobic Culture Culture in progress    Narrative:      Deep pocket #2    Culture, Anaerobe [[id number]] Collected: 11/09/23 1415    Order Status: Completed Specimen: Incision site from Chest, Left Updated: 11/10/23 0608     Anaerobic Culture Culture in progress    Narrative:      Superior pocket #1    Culture, Anaerobe [[id number]] Collected: 11/09/23 1400    Order Status: Completed Specimen: Incision site from Chest, Left Updated: 11/10/23 0608     Anaerobic Culture Culture in progress    Narrative:      Superficial pocket #2    Culture, Anaerobe [[id number]] Collected: 11/09/23 1430    Order Status: Completed Specimen: Incision site from Chest, Left Updated: 11/10/23 0608     Anaerobic Culture Culture in progress    Narrative:      Lead tip            I have reviewed all pertinent labs within the past 24 hours.    Estimated Creatinine Clearance: 92.1 mL/min (based on SCr of 1.3 mg/dL).    Diagnostic Results:  I have reviewed all pertinent imaging results/findings within the past 24 hours.    Assessment and Plan:     Kevan Queen is a 38 y.o. male on LVAD presenting with c/o defibrillator coming out of his chest. He stated that this started a few months ago. He also c/o that his picc line looks longer that the previous ones he has had. The picc line is functioning and there is no irritation around it. He uses it daily for his milrinone infusion. He states that his  defibrillator has been coming out of his chest for around a week now. He denied chest trauma, fever, nausea, vomiting or diarrhea. Breathing is at baseline and comfortable at rest. Denies orthopnea or PND or LVAD alarms.          * ICD (implantable cardioverter-defibrillator) malfunction  Patient presented with [employer] AICD falling out of L chest wall. Apparently had been this way for 1 week  Unclear reason for dehiscence of ICD site  BCx's pending. Continue cefepime and Vanc  EP extracted his device, complicated by hematoma so wound vac removed. Hemostasis achieved with general surgery help. Will continue to monitor no interventions planned at the moment.     LVAD (left ventricular assist device) present  -HeartMate 3 Implanted on 6/29/2022 as DT with RV failure on milrinone at 0.25 mcg/kg/min.  -Continue Coumadin, Goal INR 2.0-3.0. subtherapeutic at 1.5 after coumadin held in setting of INR 3.4. INR 2.0 today   -LDH is stable.  Will continue to monitor daily  -Speed set at 5100, LSL 4700 rpm  Review of device function is stable/unstable stable        11/11/2023    11:01 AM 11/11/2023    10:01 AM 11/11/2023     9:01 AM 11/11/2023     8:01 AM 11/11/2023     7:01 AM 11/11/2023     6:01 AM 11/11/2023     5:01 AM   TXP LVAD INTERROGATIONS   Type HeartMate3 HeartMate3 HeartMate3 HeartMate3 HeartMate3 HeartMate3 HeartMate3   Flow  4.2 4.2 4.2 4 3.9 3.9   Speed  5100 5100 5100 5100 5150 5100   PI  4.1 4 4.5 4.4 4.8 5   Power (Serna)  3.6 3.5 3.8 3.6 3.6 3.5   LSL     4700 4700 4700   Pulsatility     Intermittent pulse Intermittent pulse Intermittent pulse       Seizure-like activity  Continue keppra    Type 2 diabetes mellitus with hyperglycemia  Sliding scale insulin      Fausto Reyes NP  Heart Transplant  [facility] - [facility]

## 2023-11-11 NOTE — SUBJECTIVE & OBJECTIVE
Interval History: No fevers documented overnight.   Pt reports tolerating abx without issues. OR cx with staph.     Review of Systems  Objective:     Vital Signs (Most Recent):  Temp: 98.2 °F (36.8 °C) (11/11/23 1101)  Pulse: (!) 115 (11/11/23 1201)  Resp: 20 (11/11/23 1201)  BP: 99/85 (11/11/23 1101)  SpO2: 98 % (11/11/23 1101) Vital Signs (24h Range):  Temp:  [97.2 °F (36.2 °C)-98.2 °F (36.8 °C)] 98.2 °F (36.8 °C)  Pulse:  [103-131] 115  Resp:  [11-30] 20  SpO2:  [96 %-98 %] 98 %  BP: ()/(0-85) 99/85  Arterial Line BP: ()/(51-88) 86/73     Weight: 86 kg (189 lb 9.5 oz)  Body mass index is 23.7 kg/m².    Estimated Creatinine Clearance: 92.1 mL/min (based on SCr of 1.3 mg/dL).     Physical Exam  Constitutional:       General: He is not in acute distress.     Appearance: He is not ill-appearing or toxic-appearing.   HENT:      Head: Normocephalic and atraumatic.   Eyes:      General:         Right eye: No discharge.         Left eye: No discharge.   Cardiovascular:      Comments: L chest wound wrapped  Pulmonary:      Effort: Pulmonary effort is normal. No respiratory distress.   Abdominal:      Comments: LVAD     Musculoskeletal:      Right lower leg: No edema.      Left lower leg: No edema.   Skin:     General: Skin is warm and dry.      Findings: No bruising.   Neurological:      Mental Status: He is alert and oriented to person, place, and time.          Significant Labs:   Microbiology Results (last 7 days)       Procedure Component Value Units Date/Time    Aerobic culture [[id number]]  (Abnormal) Collected: 11/09/23 1400    Order Status: Completed Specimen: Incision site from Chest, Left Updated: 11/11/23 1115     Aerobic Bacterial Culture STAPHYLOCOCCUS AUREUS  Rare  Susceptibility pending        STAPHYLOCOCCUS SPECIES  Rare  Identification and susceptibility pending      Narrative:      Deep pocket #1    Aerobic culture [[id number]]  (Abnormal) Collected: 11/09/23 1400    Order Status: Completed  Specimen: Incision site from Chest, Left Updated: 11/11/23 1110     Aerobic Bacterial Culture STAPHYLOCOCCUS SPECIES  Rare  Identification and susceptibility pending      Narrative:      Deep pocket #2    Aerobic culture [[id number]]  (Abnormal) Collected: 11/09/23 1400    Order Status: Completed Specimen: Incision site from Chest, Left Updated: 11/11/23 1107     Aerobic Bacterial Culture STAPHYLOCOCCUS SPECIES  Rare  Identification and susceptibility pending      Narrative:      Superficial pocket #1    Aerobic culture [[id number]]  (Abnormal) Collected: 11/09/23 1415    Order Status: Completed Specimen: Incision site from Chest, Left Updated: 11/11/23 1104     Aerobic Bacterial Culture STAPHYLOCOCCUS AUREUS  Rare  Susceptibility pending  Skin ramesh also present      Narrative:      Superior pocket #1    Aerobic culture [[id number]]  (Abnormal) Collected: 11/09/23 1400    Order Status: Completed Specimen: Incision site from Chest, Left Updated: 11/11/23 0748     Aerobic Bacterial Culture STAPHYLOCOCCUS AUREUS  Rare  Susceptibility pending  Skin ramesh also present      Narrative:      Superficial pocket #2    Blood culture [[id number]] Collected: 11/10/23 0400    Order Status: Completed Specimen: Blood from Peripheral, Wrist, Left Updated: 11/11/23 0613     Blood Culture, Routine No Growth to date      No Growth to date    Blood culture [[id number]] Collected: 11/10/23 0403    Order Status: Completed Specimen: Blood from Peripheral, Wrist, Right Updated: 11/11/23 0613     Blood Culture, Routine No Growth to date      No Growth to date    Blood culture x two cultures. Draw prior to antibiotics. [[id number]] Collected: 11/08/23 1407    Order Status: Completed Specimen: Blood from Peripheral, Lower Arm, Left Updated: 11/10/23 1612     Blood Culture, Routine No Growth to date      No Growth to date      No Growth to date    Narrative:      Aerobic and anaerobic    Blood culture x two cultures. Draw prior to antibiotics.  [[id number]] Collected: 11/08/23 1408    Order Status: Completed Specimen: Blood from Peripheral, Antecubital, Left Updated: 11/10/23 1612     Blood Culture, Routine No Growth to date      No Growth to date      No Growth to date    Narrative:      Aerobic and anaerobic    Aerobic culture [[id number]] Collected: 11/09/23 1430    Order Status: Completed Specimen: Incision site from Chest, Left Updated: 11/10/23 0725     Aerobic Bacterial Culture No growth    Narrative:      Lead tip    Culture, Anaerobe [[id number]] Collected: 11/09/23 1400    Order Status: Completed Specimen: Incision site from Chest, Left Updated: 11/10/23 0608     Anaerobic Culture Culture in progress    Narrative:      Superficial pocket #1    Culture, Anaerobe [[id number]] Collected: 11/09/23 1400    Order Status: Completed Specimen: Incision site from Chest, Left Updated: 11/10/23 0608     Anaerobic Culture Culture in progress    Narrative:      Deep pocket #1    Culture, Anaerobe [[id number]] Collected: 11/09/23 1400    Order Status: Completed Specimen: Incision site from Chest, Left Updated: 11/10/23 0608     Anaerobic Culture Culture in progress    Narrative:      Deep pocket #2    Culture, Anaerobe [[id number]] Collected: 11/09/23 1415    Order Status: Completed Specimen: Incision site from Chest, Left Updated: 11/10/23 0608     Anaerobic Culture Culture in progress    Narrative:      Superior pocket #1    Culture, Anaerobe [[id number]] Collected: 11/09/23 1400    Order Status: Completed Specimen: Incision site from Chest, Left Updated: 11/10/23 0608     Anaerobic Culture Culture in progress    Narrative:      Superficial pocket #2    Culture, Anaerobe [[id number]] Collected: 11/09/23 1430    Order Status: Completed Specimen: Incision site from Chest, Left Updated: 11/10/23 0608     Anaerobic Culture Culture in progress    Narrative:      Lead tip            Significant Imaging: I have reviewed all pertinent imaging results/findings within  the past 24 hours.

## 2023-11-11 NOTE — PROGRESS NOTES
[facility] - Cardiac Intensive Care  Infectious Disease  Progress Note    Patient Name: Kevan Queen  MRN: [id number]  Admission Date: 11/8/2023  Length of Stay: 3 days  Attending Physician: Josh Ryan, *  Primary Care Provider: Rosio Armendariz FNP    Isolation Status: No active isolations  Assessment/Plan:      ID  ICD (implantable cardioverter-defibrillator) infection  38M with PMH of NICM EF 10-15%, ICD placement in Jan 2022, HM3 LVAD placement on 6/29/23 with DLESI 2/2 MSSA and MSSA bacteremia (on suppressive cefadroxil at home), previous MV repair, DM2, and epilepsy admitted for erosion of ICD s/p complete device removal - OR cx (pocket with staph aureus thus far, lead cx so far no growth to date. Blood cultures on admit no growth to date.          Recommendations  Continue vancomycin pharmacy to dose and cefepime  Goal trough 15-20  F/u OR cx/susceptibilities            Thank you for your consult. I will follow-up with patient. Please contact us if you have any additional questions. Above d/w primary team.         50 minutes of total time spent on the encounter, which includes face to face time and non-face to face time preparing to see the patient (eg, review of tests), obtaining and/or reviewing separately obtained history, documenting clinical information in the electronic or other health record, independently interpreting results (not separately reported) and communicating results to the patient/family/caregiver, or care coordination (not separately reported).       Laura Baldwin MD  Infectious Disease  [facility] - Cardiac Intensive Care    Subjective:     Principal Problem:ICD (implantable cardioverter-defibrillator) malfunction    HPI: Mr. Queen is a 38M with PMH of NICM EF 10-15%, ICD placement in Jan 2022, HM3 LVAD placement on 6/29/23 with DLESI 2/2 MSSA in Sept 2023, previous MV repair, DM2, epilepsy, and polysubstance abuse, presents 1 week after ICD began eroding through chest with  "some purulence. Infectious disease consulted for "ICD is fully exposed and hanging out. Pt on broad spectrum abx".     EP team has evaluated patient is planning for complete device removal. He received one dose each of vancomycin and zosyn in the ER. He does have a RUE PICC for home milrinone. Patient had a recent admission in September 2023 for MSSA bacteremia with DLESI and L empyema, was treated with ancef. He also had COVID at that time.         Interval History: No fevers documented overnight.   Pt reports tolerating abx without issues. OR cx with staph.     Review of Systems  Objective:     Vital Signs (Most Recent):  Temp: 98.2 °F (36.8 °C) (11/11/23 1101)  Pulse: (!) 115 (11/11/23 1201)  Resp: 20 (11/11/23 1201)  BP: 99/85 (11/11/23 1101)  SpO2: 98 % (11/11/23 1101) Vital Signs (24h Range):  Temp:  [97.2 °F (36.2 °C)-98.2 °F (36.8 °C)] 98.2 °F (36.8 °C)  Pulse:  [103-131] 115  Resp:  [11-30] 20  SpO2:  [96 %-98 %] 98 %  BP: ()/(0-85) 99/85  Arterial Line BP: ()/(51-88) 86/73     Weight: 86 kg (189 lb 9.5 oz)  Body mass index is 23.7 kg/m².    Estimated Creatinine Clearance: 92.1 mL/min (based on SCr of 1.3 mg/dL).     Physical Exam  Constitutional:       General: He is not in acute distress.     Appearance: He is not ill-appearing or toxic-appearing.   HENT:      Head: Normocephalic and atraumatic.   Eyes:      General:         Right eye: No discharge.         Left eye: No discharge.   Cardiovascular:      Comments: L chest wound wrapped  Pulmonary:      Effort: Pulmonary effort is normal. No respiratory distress.   Abdominal:      Comments: LVAD     Musculoskeletal:      Right lower leg: No edema.      Left lower leg: No edema.   Skin:     General: Skin is warm and dry.      Findings: No bruising.   Neurological:      Mental Status: He is alert and oriented to person, place, and time.          Significant Labs:   Microbiology Results (last 7 days)       Procedure Component Value Units Date/Time "    Aerobic culture [[id number]]  (Abnormal) Collected: 11/09/23 1400    Order Status: Completed Specimen: Incision site from Chest, Left Updated: 11/11/23 1115     Aerobic Bacterial Culture STAPHYLOCOCCUS AUREUS  Rare  Susceptibility pending        STAPHYLOCOCCUS SPECIES  Rare  Identification and susceptibility pending      Narrative:      Deep pocket #1    Aerobic culture [[id number]]  (Abnormal) Collected: 11/09/23 1400    Order Status: Completed Specimen: Incision site from Chest, Left Updated: 11/11/23 1110     Aerobic Bacterial Culture STAPHYLOCOCCUS SPECIES  Rare  Identification and susceptibility pending      Narrative:      Deep pocket #2    Aerobic culture [[id number]]  (Abnormal) Collected: 11/09/23 1400    Order Status: Completed Specimen: Incision site from Chest, Left Updated: 11/11/23 1107     Aerobic Bacterial Culture STAPHYLOCOCCUS SPECIES  Rare  Identification and susceptibility pending      Narrative:      Superficial pocket #1    Aerobic culture [[id number]]  (Abnormal) Collected: 11/09/23 1415    Order Status: Completed Specimen: Incision site from Chest, Left Updated: 11/11/23 1104     Aerobic Bacterial Culture STAPHYLOCOCCUS AUREUS  Rare  Susceptibility pending  Skin ramesh also present      Narrative:      Superior pocket #1    Aerobic culture [[id number]]  (Abnormal) Collected: 11/09/23 1400    Order Status: Completed Specimen: Incision site from Chest, Left Updated: 11/11/23 0748     Aerobic Bacterial Culture STAPHYLOCOCCUS AUREUS  Rare  Susceptibility pending  Skin ramesh also present      Narrative:      Superficial pocket #2    Blood culture [[id number]] Collected: 11/10/23 0400    Order Status: Completed Specimen: Blood from Peripheral, Wrist, Left Updated: 11/11/23 0613     Blood Culture, Routine No Growth to date      No Growth to date    Blood culture [[id number]] Collected: 11/10/23 0403    Order Status: Completed Specimen: Blood from Peripheral, Wrist, Right Updated: 11/11/23 0613      Blood Culture, Routine No Growth to date      No Growth to date    Blood culture x two cultures. Draw prior to antibiotics. [[id number]] Collected: 11/08/23 1407    Order Status: Completed Specimen: Blood from Peripheral, Lower Arm, Left Updated: 11/10/23 1612     Blood Culture, Routine No Growth to date      No Growth to date      No Growth to date    Narrative:      Aerobic and anaerobic    Blood culture x two cultures. Draw prior to antibiotics. [[id number]] Collected: 11/08/23 1408    Order Status: Completed Specimen: Blood from Peripheral, Antecubital, Left Updated: 11/10/23 1612     Blood Culture, Routine No Growth to date      No Growth to date      No Growth to date    Narrative:      Aerobic and anaerobic    Aerobic culture [[id number]] Collected: 11/09/23 1430    Order Status: Completed Specimen: Incision site from Chest, Left Updated: 11/10/23 0725     Aerobic Bacterial Culture No growth    Narrative:      Lead tip    Culture, Anaerobe [[id number]] Collected: 11/09/23 1400    Order Status: Completed Specimen: Incision site from Chest, Left Updated: 11/10/23 0608     Anaerobic Culture Culture in progress    Narrative:      Superficial pocket #1    Culture, Anaerobe [[id number]] Collected: 11/09/23 1400    Order Status: Completed Specimen: Incision site from Chest, Left Updated: 11/10/23 0608     Anaerobic Culture Culture in progress    Narrative:      Deep pocket #1    Culture, Anaerobe [[id number]] Collected: 11/09/23 1400    Order Status: Completed Specimen: Incision site from Chest, Left Updated: 11/10/23 0608     Anaerobic Culture Culture in progress    Narrative:      Deep pocket #2    Culture, Anaerobe [[id number]] Collected: 11/09/23 1415    Order Status: Completed Specimen: Incision site from Chest, Left Updated: 11/10/23 0608     Anaerobic Culture Culture in progress    Narrative:      Superior pocket #1    Culture, Anaerobe [[id number]] Collected: 11/09/23 1400    Order Status: Completed  Specimen: Incision site from Chest, Left Updated: 11/10/23 0608     Anaerobic Culture Culture in progress    Narrative:      Superficial pocket #2    Culture, Anaerobe [[id number]] Collected: 11/09/23 1430    Order Status: Completed Specimen: Incision site from Chest, Left Updated: 11/10/23 0608     Anaerobic Culture Culture in progress    Narrative:      Lead tip            Significant Imaging: I have reviewed all pertinent imaging results/findings within the past 24 hours.

## 2023-11-12 LAB
ALBUMIN SERPL BCP-MCNC: 2.7 G/DL (ref 3.5–5.2)
ALP SERPL-CCNC: 112 U/L (ref 55–135)
ALT SERPL W/O P-5'-P-CCNC: 8 U/L (ref 10–44)
ANION GAP SERPL CALC-SCNC: 10 MMOL/L (ref 8–16)
ANION GAP SERPL CALC-SCNC: 8 MMOL/L (ref 8–16)
APTT PPP: 23.9 SEC (ref 21–32)
AST SERPL-CCNC: 19 U/L (ref 10–40)
BASOPHILS # BLD AUTO: 0.05 K/UL (ref 0–0.2)
BASOPHILS NFR BLD: 0.4 % (ref 0–1.9)
BILIRUB SERPL-MCNC: 0.9 MG/DL (ref 0.1–1)
BUN SERPL-MCNC: 12 MG/DL (ref 6–20)
BUN SERPL-MCNC: 14 MG/DL (ref 6–20)
CALCIUM SERPL-MCNC: 8.9 MG/DL (ref 8.7–10.5)
CALCIUM SERPL-MCNC: 8.9 MG/DL (ref 8.7–10.5)
CHLORIDE SERPL-SCNC: 100 MMOL/L (ref 95–110)
CHLORIDE SERPL-SCNC: 97 MMOL/L (ref 95–110)
CO2 SERPL-SCNC: 26 MMOL/L (ref 23–29)
CO2 SERPL-SCNC: 30 MMOL/L (ref 23–29)
CREAT SERPL-MCNC: 1.3 MG/DL (ref 0.5–1.4)
CREAT SERPL-MCNC: 1.4 MG/DL (ref 0.5–1.4)
DIFFERENTIAL METHOD: ABNORMAL
EOSINOPHIL # BLD AUTO: 0.1 K/UL (ref 0–0.5)
EOSINOPHIL NFR BLD: 0.8 % (ref 0–8)
ERYTHROCYTE [DISTWIDTH] IN BLOOD BY AUTOMATED COUNT: 19.4 % (ref 11.5–14.5)
EST. GFR  (NO RACE VARIABLE): >60 ML/MIN/1.73 M^2
EST. GFR  (NO RACE VARIABLE): >60 ML/MIN/1.73 M^2
GLUCOSE SERPL-MCNC: 177 MG/DL (ref 70–110)
GLUCOSE SERPL-MCNC: 192 MG/DL (ref 70–110)
HCT VFR BLD AUTO: 25.2 % (ref 40–54)
HGB BLD-MCNC: 7.7 G/DL (ref 14–18)
HGB BLD-MCNC: 7.8 G/DL (ref 14–18)
HGB BLD-MCNC: 7.9 G/DL (ref 14–18)
HGB BLD-MCNC: 8.1 G/DL (ref 14–18)
IMM GRANULOCYTES # BLD AUTO: 0.03 K/UL (ref 0–0.04)
IMM GRANULOCYTES NFR BLD AUTO: 0.3 % (ref 0–0.5)
INR PPP: 1.2 (ref 0.8–1.2)
LDH SERPL L TO P-CCNC: 243 U/L (ref 110–260)
LYMPHOCYTES # BLD AUTO: 2.5 K/UL (ref 1–4.8)
LYMPHOCYTES NFR BLD: 21.7 % (ref 18–48)
MAGNESIUM SERPL-MCNC: 1.6 MG/DL (ref 1.6–2.6)
MAGNESIUM SERPL-MCNC: 1.8 MG/DL (ref 1.6–2.6)
MCH RBC QN AUTO: 23.8 PG (ref 27–31)
MCHC RBC AUTO-ENTMCNC: 31.3 G/DL (ref 32–36)
MCV RBC AUTO: 76 FL (ref 82–98)
MONOCYTES # BLD AUTO: 0.8 K/UL (ref 0.3–1)
MONOCYTES NFR BLD: 7 % (ref 4–15)
NEUTROPHILS # BLD AUTO: 8 K/UL (ref 1.8–7.7)
NEUTROPHILS NFR BLD: 69.8 % (ref 38–73)
NRBC BLD-RTO: 0 /100 WBC
PHOSPHATE SERPL-MCNC: 2.6 MG/DL (ref 2.7–4.5)
PLATELET # BLD AUTO: 297 K/UL (ref 150–450)
PMV BLD AUTO: 10.4 FL (ref 9.2–12.9)
POCT GLUCOSE: 100 MG/DL (ref 70–110)
POCT GLUCOSE: 173 MG/DL (ref 70–110)
POCT GLUCOSE: 178 MG/DL (ref 70–110)
POCT GLUCOSE: 197 MG/DL (ref 70–110)
POCT GLUCOSE: 240 MG/DL (ref 70–110)
POCT GLUCOSE: 287 MG/DL (ref 70–110)
POTASSIUM SERPL-SCNC: 3.6 MMOL/L (ref 3.5–5.1)
POTASSIUM SERPL-SCNC: 4.1 MMOL/L (ref 3.5–5.1)
PROT SERPL-MCNC: 6.6 G/DL (ref 6–8.4)
PROTHROMBIN TIME: 12.9 SEC (ref 9–12.5)
RBC # BLD AUTO: 3.32 M/UL (ref 4.6–6.2)
SODIUM SERPL-SCNC: 135 MMOL/L (ref 136–145)
SODIUM SERPL-SCNC: 136 MMOL/L (ref 136–145)
WBC # BLD AUTO: 11.45 K/UL (ref 3.9–12.7)

## 2023-11-12 PROCEDURE — 99232 SBSQ HOSP IP/OBS MODERATE 35: CPT | Mod: ,,, | Performed by: NURSE PRACTITIONER

## 2023-11-12 PROCEDURE — 25000003 PHARM REV CODE 250: Performed by: STUDENT IN AN ORGANIZED HEALTH CARE EDUCATION/TRAINING PROGRAM

## 2023-11-12 PROCEDURE — 25000003 PHARM REV CODE 250: Performed by: NURSE PRACTITIONER

## 2023-11-12 PROCEDURE — 85018 HEMOGLOBIN: CPT | Mod: 91 | Performed by: NURSE PRACTITIONER

## 2023-11-12 PROCEDURE — C1751 CATH, INF, PER/CENT/MIDLINE: HCPCS

## 2023-11-12 PROCEDURE — 83615 LACTATE (LD) (LDH) ENZYME: CPT | Performed by: NURSE PRACTITIONER

## 2023-11-12 PROCEDURE — 99233 SBSQ HOSP IP/OBS HIGH 50: CPT | Mod: ,,, | Performed by: STUDENT IN AN ORGANIZED HEALTH CARE EDUCATION/TRAINING PROGRAM

## 2023-11-12 PROCEDURE — 99233 PR SUBSEQUENT HOSPITAL CARE,LEVL III: ICD-10-PCS | Mod: FS,,, | Performed by: NURSE PRACTITIONER

## 2023-11-12 PROCEDURE — 80053 COMPREHEN METABOLIC PANEL: CPT | Performed by: NURSE PRACTITIONER

## 2023-11-12 PROCEDURE — 63600175 PHARM REV CODE 636 W HCPCS: Performed by: NURSE PRACTITIONER

## 2023-11-12 PROCEDURE — 25000003 PHARM REV CODE 250: Performed by: INTERNAL MEDICINE

## 2023-11-12 PROCEDURE — 85610 PROTHROMBIN TIME: CPT | Performed by: NURSE PRACTITIONER

## 2023-11-12 PROCEDURE — 27000248 HC VAD-ADDITIONAL DAY

## 2023-11-12 PROCEDURE — 99233 SBSQ HOSP IP/OBS HIGH 50: CPT | Mod: FS,,, | Performed by: NURSE PRACTITIONER

## 2023-11-12 PROCEDURE — 93750 PR INTERROGATE VENT ASSIST DEV, IN PERSON, W PHYSICIAN ANALYSIS: ICD-10-PCS | Mod: ,,, | Performed by: INTERNAL MEDICINE

## 2023-11-12 PROCEDURE — 36415 COLL VENOUS BLD VENIPUNCTURE: CPT | Performed by: NURSE PRACTITIONER

## 2023-11-12 PROCEDURE — A4216 STERILE WATER/SALINE, 10 ML: HCPCS | Performed by: INTERNAL MEDICINE

## 2023-11-12 PROCEDURE — 99232 PR SUBSEQUENT HOSPITAL CARE,LEVL II: ICD-10-PCS | Mod: ,,, | Performed by: NURSE PRACTITIONER

## 2023-11-12 PROCEDURE — 20600001 HC STEP DOWN PRIVATE ROOM

## 2023-11-12 PROCEDURE — 93750 INTERROGATION VAD IN PERSON: CPT | Mod: ,,, | Performed by: INTERNAL MEDICINE

## 2023-11-12 PROCEDURE — 83735 ASSAY OF MAGNESIUM: CPT | Mod: 91 | Performed by: STUDENT IN AN ORGANIZED HEALTH CARE EDUCATION/TRAINING PROGRAM

## 2023-11-12 PROCEDURE — 83735 ASSAY OF MAGNESIUM: CPT | Performed by: NURSE PRACTITIONER

## 2023-11-12 PROCEDURE — 36573 INSJ PICC RS&I 5 YR+: CPT

## 2023-11-12 PROCEDURE — 63600175 PHARM REV CODE 636 W HCPCS: Performed by: INTERNAL MEDICINE

## 2023-11-12 PROCEDURE — 85730 THROMBOPLASTIN TIME PARTIAL: CPT | Performed by: NURSE PRACTITIONER

## 2023-11-12 PROCEDURE — 94761 N-INVAS EAR/PLS OXIMETRY MLT: CPT

## 2023-11-12 PROCEDURE — 85025 COMPLETE CBC W/AUTO DIFF WBC: CPT | Performed by: NURSE PRACTITIONER

## 2023-11-12 PROCEDURE — 84100 ASSAY OF PHOSPHORUS: CPT | Performed by: NURSE PRACTITIONER

## 2023-11-12 PROCEDURE — 99233 PR SUBSEQUENT HOSPITAL CARE,LEVL III: ICD-10-PCS | Mod: ,,, | Performed by: STUDENT IN AN ORGANIZED HEALTH CARE EDUCATION/TRAINING PROGRAM

## 2023-11-12 PROCEDURE — 80048 BASIC METABOLIC PNL TOTAL CA: CPT | Mod: XB | Performed by: STUDENT IN AN ORGANIZED HEALTH CARE EDUCATION/TRAINING PROGRAM

## 2023-11-12 PROCEDURE — 76937 US GUIDE VASCULAR ACCESS: CPT

## 2023-11-12 RX ORDER — POTASSIUM CHLORIDE 20 MEQ/1
40 TABLET, EXTENDED RELEASE ORAL ONCE
Status: COMPLETED | OUTPATIENT
Start: 2023-11-12 | End: 2023-11-12

## 2023-11-12 RX ORDER — METHOCARBAMOL 500 MG/1
500 TABLET, FILM COATED ORAL 4 TIMES DAILY
Status: DISCONTINUED | OUTPATIENT
Start: 2023-11-12 | End: 2023-11-14

## 2023-11-12 RX ORDER — WARFARIN 3 MG/1
6 TABLET ORAL DAILY
Status: COMPLETED | OUTPATIENT
Start: 2023-11-12 | End: 2023-11-12

## 2023-11-12 RX ORDER — LANOLIN ALCOHOL/MO/W.PET/CERES
400 CREAM (GRAM) TOPICAL ONCE
Status: COMPLETED | OUTPATIENT
Start: 2023-11-12 | End: 2023-11-12

## 2023-11-12 RX ORDER — SODIUM CHLORIDE 0.9 % (FLUSH) 0.9 %
10 SYRINGE (ML) INJECTION EVERY 6 HOURS
Status: DISCONTINUED | OUTPATIENT
Start: 2023-11-12 | End: 2023-11-15 | Stop reason: HOSPADM

## 2023-11-12 RX ORDER — SODIUM CHLORIDE 0.9 % (FLUSH) 0.9 %
10 SYRINGE (ML) INJECTION
Status: DISCONTINUED | OUTPATIENT
Start: 2023-11-12 | End: 2023-11-15 | Stop reason: HOSPADM

## 2023-11-12 RX ADMIN — MIRTAZAPINE 15 MG: 15 TABLET, FILM COATED ORAL at 08:11

## 2023-11-12 RX ADMIN — ATORVASTATIN CALCIUM 40 MG: 40 TABLET, FILM COATED ORAL at 09:11

## 2023-11-12 RX ADMIN — INSULIN ASPART 8 UNITS: 100 INJECTION, SOLUTION INTRAVENOUS; SUBCUTANEOUS at 09:11

## 2023-11-12 RX ADMIN — LEVETIRACETAM 1000 MG: 500 TABLET, FILM COATED ORAL at 08:11

## 2023-11-12 RX ADMIN — FUROSEMIDE 80 MG: 80 TABLET ORAL at 09:11

## 2023-11-12 RX ADMIN — Medication 400 MG: at 09:11

## 2023-11-12 RX ADMIN — SPIRONOLACTONE 25 MG: 25 TABLET ORAL at 09:11

## 2023-11-12 RX ADMIN — INSULIN HUMAN 1.5 UNITS/HR: 1 INJECTION, SOLUTION INTRAVENOUS at 04:11

## 2023-11-12 RX ADMIN — MILRINONE LACTATE IN DEXTROSE 0.25 MCG/KG/MIN: 200 INJECTION, SOLUTION INTRAVENOUS at 01:11

## 2023-11-12 RX ADMIN — PANTOPRAZOLE SODIUM 40 MG: 40 TABLET, DELAYED RELEASE ORAL at 09:11

## 2023-11-12 RX ADMIN — CEFEPIME 2 G: 2 INJECTION, POWDER, FOR SOLUTION INTRAVENOUS at 09:11

## 2023-11-12 RX ADMIN — Medication 400 MG: at 08:11

## 2023-11-12 RX ADMIN — INSULIN ASPART 2 UNITS: 100 INJECTION, SOLUTION INTRAVENOUS; SUBCUTANEOUS at 09:11

## 2023-11-12 RX ADMIN — Medication 10 ML: at 06:11

## 2023-11-12 RX ADMIN — LEVETIRACETAM 1000 MG: 500 TABLET, FILM COATED ORAL at 09:11

## 2023-11-12 RX ADMIN — VANCOMYCIN HYDROCHLORIDE 1500 MG: 1.5 INJECTION, POWDER, LYOPHILIZED, FOR SOLUTION INTRAVENOUS at 05:11

## 2023-11-12 RX ADMIN — POTASSIUM CHLORIDE 40 MEQ: 1500 TABLET, EXTENDED RELEASE ORAL at 08:11

## 2023-11-12 RX ADMIN — ASPIRIN 81 MG: 81 TABLET, COATED ORAL at 09:11

## 2023-11-12 RX ADMIN — INSULIN ASPART 8 UNITS: 100 INJECTION, SOLUTION INTRAVENOUS; SUBCUTANEOUS at 05:11

## 2023-11-12 RX ADMIN — INSULIN ASPART 8 UNITS: 100 INJECTION, SOLUTION INTRAVENOUS; SUBCUTANEOUS at 12:11

## 2023-11-12 RX ADMIN — METHOCARBAMOL 500 MG: 500 TABLET ORAL at 08:11

## 2023-11-12 RX ADMIN — WARFARIN SODIUM 6 MG: 3 TABLET ORAL at 05:11

## 2023-11-12 NOTE — SUBJECTIVE & OBJECTIVE
Interval History:     Patient just had new LUE PICC placed this morning. States he is feeling well, denies any new complaints.     Review of Systems   Constitutional:  Negative for chills and fever.   Respiratory:  Negative for cough and shortness of breath.    Cardiovascular:  Negative for chest pain.   Gastrointestinal:  Negative for abdominal pain, constipation, diarrhea, nausea and vomiting.   Musculoskeletal:  Negative for arthralgias and myalgias.   Neurological:  Negative for headaches.     Objective:     Vital Signs (Most Recent):  Temp: 98.2 °F (36.8 °C) (11/12/23 0741)  Pulse: (!) 116 (11/12/23 0741)  Resp: 18 (11/12/23 0741)  BP: (!) 82/0 (11/12/23 0817)  SpO2: 100 % (11/12/23 0741) Vital Signs (24h Range):  Temp:  [97.6 °F (36.4 °C)-98.3 °F (36.8 °C)] 98.2 °F (36.8 °C)  Pulse:  [109-122] 116  Resp:  [17-19] 18  SpO2:  [94 %-100 %] 100 %  BP: (74-82)/(0) 82/0  Arterial Line BP: (99)/(84) 99/84     Weight: 86 kg (189 lb 9.5 oz)  Body mass index is 23.7 kg/m².    Estimated Creatinine Clearance: 92.1 mL/min (based on SCr of 1.3 mg/dL).     Physical Exam  Vitals reviewed.   Constitutional:       General: He is not in acute distress.     Appearance: Normal appearance. He is not ill-appearing.   HENT:      Head: Normocephalic and atraumatic.   Eyes:      Extraocular Movements: Extraocular movements intact.      Conjunctiva/sclera: Conjunctivae normal.   Cardiovascular:      Rate and Rhythm: Normal rate and regular rhythm.      Heart sounds: No murmur heard.     Comments: L chest  with dressing in place, is clean, dry, and intact  Pulmonary:      Effort: Pulmonary effort is normal. No respiratory distress.      Breath sounds: Normal breath sounds.   Abdominal:      General: Abdomen is flat. Bowel sounds are normal.      Palpations: Abdomen is soft.      Tenderness: There is no abdominal tenderness.   Musculoskeletal:      Cervical back: Normal range of motion.   Skin:     General: Skin is warm and dry.    Neurological:      General: No focal deficit present.      Mental Status: He is alert and oriented to person, place, and time.   Psychiatric:         Mood and Affect: Mood normal.         Behavior: Behavior normal.         Thought Content: Thought content normal.          Significant Labs: All pertinent labs within the past 24 hours have been reviewed.  Recent Lab Results  (Last 5 results in the past 24 hours)        11/12/23  1100   11/12/23  0746   11/12/23  0510   11/12/23  0509   11/12/23  0356        Albumin       2.7         ALP       112         ALT       8         Anion Gap       10         aPTT     23.9  Comment: Refer to local heparin nomogram for intensity/dose specific   therapeutic   range.             AST       19         Baso #     0.05           Basophil %     0.4           BILIRUBIN TOTAL       0.9  Comment: For infants and newborns, interpretation of results should be based  on gestational age, weight and in agreement with clinical  observations.    Premature Infant recommended reference ranges:  Up to 24 hours.............<8.0 mg/dL  Up to 48 hours............<12.0 mg/dL  3-5 days..................<15.0 mg/dL  6-29 days.................<15.0 mg/dL           BUN       12         Calcium       8.9         Chloride       100         CO2       26         Creatinine       1.3         Differential Method     Automated           eGFR       >60.0         Eos #     0.1           Eosinophil %     0.8           Glucose       177         Gran # (ANC)     8.0           Gran %     69.8           Hematocrit     25.2           Hemoglobin     7.9                7.8           Immature Grans (Abs)     0.03  Comment: Mild elevation in immature granulocytes is non specific and   can be seen in a variety of conditions including stress response,   acute inflammation, trauma and pregnancy. Correlation with other   laboratory and clinical findings is essential.             Immature Granulocytes     0.3           INR      1.2  Comment: Coumadin Therapy:  2.0 - 3.0 for INR for all indicators except mechanical heart valves  and antiphospholipid syndromes which should use 2.5 - 3.5.             LD       243  Comment: Results are increased in hemolyzed samples.         Lymph #     2.5           Lymph %     21.7           Magnesium        1.6         MCH     23.8           MCHC     31.3           MCV     76           Mono #     0.8           Mono %     7.0           MPV     10.4           nRBC     0           Phosphorus Level       2.6         Platelet Count     297           POCT Glucose 287   178       100       Potassium       3.6         PROTEIN TOTAL       6.6         Protime     12.9           RBC     3.32           RDW     19.4           Sodium       136         WBC     11.45                                  Significant Imaging: I have reviewed all pertinent imaging results/findings within the past 24 hours.

## 2023-11-12 NOTE — ASSESSMENT & PLAN NOTE
Endocrinology consulted for BG management.   BG goal 140-180     WBD 0.5 units/kg/day (initial)  - Transition drip at 1.5 units/hr with step-down parameters.   - Novolog (aspart) insulin 4-8 Units SQ TIDWM and prn for BG excursions [facility] SSI (150/25).  - BG checks q4hr until BG excursions resolve.   - Hypoglycemia protocol in place    ** Please notify Endocrine for any change and/or advance in diet**  ** Please call Endocrine for any BG related issues **    Discharge Planning:   TBD. Please notify endocrinology prior to discharge.

## 2023-11-12 NOTE — PROGRESS NOTES
"[facility] - Cardiology Stepdown  Endocrinology  Progress Note    Admit Date: 2023     Reason for Consult: Management of T2DM, Hyperglycemia      Surgical Procedure and Date: LVAD 2022     Diabetes diagnosis year:      Home Diabetes Medications:   -Levemir 8 units BID.   -Novolog 6 units TIDWM in addition to the following correction scale:     150 - 200 + 1 unit     201 - 250 + 2 units     251 - 300 + 3 units     301 - 350 + 4 units      > 350   + 5 units     How often checking glucose at home?  > 4 times per day  BG readings on regimen: 180's-200's  Hypoglycemia on the regimen?  No  Missed doses on regimen?  occasionally skips mealsn and will skip Novolog with breakfast      Diabetes Complications include:     Hyperglycemia     Complicating diabetes co morbidities:   History of MI, CHF, and CKD        HPI: Mr. Queen is a 37yo male with HF (EF 10-15%), s/p LVAD placement with HeartMate III, ICD placement 2022, DM2, epilepsy who presented as his ICD began to erode through his skin and was exposed. BG excursions noted in the 400's yesterday evening. Endocrine consulted to manage hyperglycemia and type 2 diabetes.     Lab Results   Component Value Date    HGBA1C 6.9 (H) 2023         Interval HPI:   Overnight events: No acute events overnight. Patient in room 309/309 A. Blood glucose stable. BG at and above goal on current insulin regimen (Transition Insulin Drip). Steroid use- None. 3 Days Post-Op  Renal function- Normal   Vasopressors-  None       Endocrine will continue to follow and manage insulin orders inpatient.         Diet diabetic Cardiac (Low Na/Chol); 2000 Calorie; Fluid - 1500mL; Standard Tray     Eatin%  Nausea: No  Hypoglycemia and intervention: No  Fever: No  TPN and/or TF: No    BP (!) 78/0 (BP Location: Right arm, Patient Position: Lying)   Pulse (!) 111   Temp 97.6 °F (36.4 °C) (Oral)   Resp 18   Ht 6' 3" (1.905 m)   Wt 86 kg (189 lb 9.5 oz)   SpO2 96%   BMI 23.70 " "kg/m²     Labs Reviewed and Include    Recent Labs   Lab 11/12/23  0509   *   CALCIUM 8.9   ALBUMIN 2.7*   PROT 6.6      K 3.6   CO2 26      BUN 12   CREATININE 1.3   ALKPHOS 112   ALT 8*   AST 19   BILITOT 0.9     Lab Results   Component Value Date    WBC 11.45 11/12/2023    HGB 7.8 (L) 11/12/2023    HGB 7.9 (L) 11/12/2023    HCT 25.2 (L) 11/12/2023    MCV 76 (L) 11/12/2023     11/12/2023     No results for input(s): "TSH", "FREET4" in the last 168 hours.  Lab Results   Component Value Date    HGBA1C 6.9 (H) 08/31/2023       Nutritional status:   Body mass index is 23.7 kg/m².  Lab Results   Component Value Date    ALBUMIN 2.7 (L) 11/12/2023    ALBUMIN 2.7 (L) 11/11/2023    ALBUMIN 2.8 (L) 11/10/2023     Lab Results   Component Value Date    PREALBUMIN 15 (L) 08/07/2023    PREALBUMIN 21 08/04/2023    PREALBUMIN 29 08/02/2023       Estimated Creatinine Clearance: 92.1 mL/min (based on SCr of 1.3 mg/dL).    Accu-Checks  Recent Labs     11/10/23  1649 11/10/23  2134 11/10/23  2356 11/11/23  0441 11/11/23  0810 11/11/23  1158 11/11/23  1613 11/11/23  1949 11/11/23  2334 11/12/23  0356   POCTGLUCOSE 408* 428* 333* 268* 339* 272* 198* 310* 288* 100       Current Medications and/or Treatments Impacting Glycemic Control  Immunotherapy:    Immunosuppressants       None          Steroids:   Hormones (From admission, onward)      None          Pressors:    Autonomic Drugs (From admission, onward)      None          Hyperglycemia/Diabetes Medications:   Antihyperglycemics (From admission, onward)      Start     Stop Route Frequency Ordered    11/12/23 0715  insulin regular in 0.9 % NaCl 100 unit/100 mL (1 unit/mL) infusion        Question:  Enter initial dose (Units/hr):  Answer:  1.5    -- IV Continuous 11/12/23 0713    11/11/23 1130  insulin aspart U-100 pen 4-8 Units         -- SubQ 3 times daily with meals 11/11/23 0907    11/10/23 1855  insulin aspart U-100 pen 0-10 Units         -- SubQ As " needed (PRN) 11/10/23 0974            ASSESSMENT and PLAN    Cardiac/Vascular  * ICD (implantable cardioverter-defibrillator) malfunction  Managed per primary team  Avoid hypoglycemia  Optimize BG control to improve wound healing  Infection may elevate BG readings  On IV antibiotics          LVAD (left ventricular assist device) present  Managed per primary team  Avoid hypoglycemia        Endocrine  Type 2 diabetes mellitus with hyperglycemia  Endocrinology consulted for BG management.   BG goal 140-180     WBD 0.5 units/kg/day (initial)  - Transition drip at 1.5 units/hr with step-down parameters.   - Novolog (aspart) insulin 4-8 Units SQ TIDWM and prn for BG excursions [facility] SSI (150/25).  - BG checks q4hr until BG excursions resolve.   - Hypoglycemia protocol in place    ** Please notify Endocrine for any change and/or advance in diet**  ** Please call Endocrine for any BG related issues **    Discharge Planning:   TBD. Please notify endocrinology prior to discharge.             Michael Wyman, DNP, FNP  Endocrinology  [facility] - Cardiology Stepdown

## 2023-11-12 NOTE — ASSESSMENT & PLAN NOTE
38M with PMH of NICM EF 10-15%, ICD placement in Jan 2022, HM3 LVAD placement on 6/29/23 with DLESI 2/2 MSSA in Sept 2023, previous MV repair, DM2, and epilepsy admitted for erosion of ICD s/p complete device removal - OR cx (pocket with staph aureus and staph epi thus far, lead cx so far no growth to date. Blood cultures on admit and repeat no growth to date. Was on chronic cefadroxil at home for previous LVAD MSSA infection.          Recommendations:  · Continue vancomycin, pharmacy to dose  · Stop cefepime   F/u OR cx and sensitivities

## 2023-11-12 NOTE — PROGRESS NOTES
"[facility] - Cardiology Stepdown  Infectious Disease  Progress Note    Patient Name: Kevan Queen  MRN: [id number]  Admission Date: 11/8/2023  Length of Stay: 4 days  Attending Physician: Josh Ryan, *  Primary Care Provider: Rosio Armendariz FNP    Isolation Status: No active isolations  Assessment/Plan:      ID  ICD (implantable cardioverter-defibrillator) infection  38M with PMH of NICM EF 10-15%, ICD placement in Jan 2022, HM3 LVAD placement on 6/29/23 with DLESI 2/2 MSSA in Sept 2023, previous MV repair, DM2, and epilepsy admitted for erosion of ICD s/p complete device removal - OR cx (pocket with staph aureus and staph epi thus far, lead cx so far no growth to date. Blood cultures on admit and repeat no growth to date. Was on chronic cefadroxil at home for previous LVAD MSSA infection.          Recommendations:  · Continue vancomycin, pharmacy to dose  · Stop cefepime   F/u OR cx and sensitivities        Anticipated Disposition: TBD    Thank you for your consult. I will follow-up with patient. Please contact us if you have any additional questions.    Jo Ann Willson,   Infectious Disease  [facility] - Cardiology Stepdown    Subjective:     Principal Problem:ICD (implantable cardioverter-defibrillator) malfunction    HPI: Mr. Queen is a 38M with PMH of NICM EF 10-15%, ICD placement in Jan 2022, HM3 LVAD placement on 6/29/23 with DLESI 2/2 MSSA in Sept 2023, previous MV repair, DM2, epilepsy, and polysubstance abuse, presents 1 week after ICD began eroding through chest with some purulence. Infectious disease consulted for "ICD is fully exposed and hanging out. Pt on broad spectrum abx".     EP team has evaluated patient is planning for complete device removal. He received one dose each of vancomycin and zosyn in the ER. He does have a RUE PICC for home milrinone. Patient had a recent admission in September 2023 for MSSA bacteremia with DLESI and L empyema, was treated with ancef. He also had COVID at " that time.           Interval History:     Patient just had new LUE PICC placed this morning. States he is feeling well, denies any new complaints.     Review of Systems   Constitutional:  Negative for chills and fever.   Respiratory:  Negative for cough and shortness of breath.    Cardiovascular:  Negative for chest pain.   Gastrointestinal:  Negative for abdominal pain, constipation, diarrhea, nausea and vomiting.   Musculoskeletal:  Negative for arthralgias and myalgias.   Neurological:  Negative for headaches.     Objective:     Vital Signs (Most Recent):  Temp: 98.2 °F (36.8 °C) (11/12/23 0741)  Pulse: (!) 116 (11/12/23 0741)  Resp: 18 (11/12/23 0741)  BP: (!) 82/0 (11/12/23 0817)  SpO2: 100 % (11/12/23 0741) Vital Signs (24h Range):  Temp:  [97.6 °F (36.4 °C)-98.3 °F (36.8 °C)] 98.2 °F (36.8 °C)  Pulse:  [109-122] 116  Resp:  [17-19] 18  SpO2:  [94 %-100 %] 100 %  BP: (74-82)/(0) 82/0  Arterial Line BP: (99)/(84) 99/84     Weight: 86 kg (189 lb 9.5 oz)  Body mass index is 23.7 kg/m².    Estimated Creatinine Clearance: 92.1 mL/min (based on SCr of 1.3 mg/dL).     Physical Exam  Vitals reviewed.   Constitutional:       General: He is not in acute distress.     Appearance: Normal appearance. He is not ill-appearing.   HENT:      Head: Normocephalic and atraumatic.   Eyes:      Extraocular Movements: Extraocular movements intact.      Conjunctiva/sclera: Conjunctivae normal.   Cardiovascular:      Rate and Rhythm: Normal rate and regular rhythm.      Heart sounds: No murmur heard.     Comments: L chest  with dressing in place, is clean, dry, and intact  Pulmonary:      Effort: Pulmonary effort is normal. No respiratory distress.      Breath sounds: Normal breath sounds.   Abdominal:      General: Abdomen is flat. Bowel sounds are normal.      Palpations: Abdomen is soft.      Tenderness: There is no abdominal tenderness.   Musculoskeletal:      Cervical back: Normal range of motion.   Skin:     General: Skin is  warm and dry.   Neurological:      General: No focal deficit present.      Mental Status: He is alert and oriented to person, place, and time.   Psychiatric:         Mood and Affect: Mood normal.         Behavior: Behavior normal.         Thought Content: Thought content normal.          Significant Labs: All pertinent labs within the past 24 hours have been reviewed.  Recent Lab Results  (Last 5 results in the past 24 hours)        11/12/23  1100   11/12/23  0746   11/12/23  0510   11/12/23  0509   11/12/23  0356        Albumin       2.7         ALP       112         ALT       8         Anion Gap       10         aPTT     23.9  Comment: Refer to local heparin nomogram for intensity/dose specific   therapeutic   range.             AST       19         Baso #     0.05           Basophil %     0.4           BILIRUBIN TOTAL       0.9  Comment: For infants and newborns, interpretation of results should be based  on gestational age, weight and in agreement with clinical  observations.    Premature Infant recommended reference ranges:  Up to 24 hours.............<8.0 mg/dL  Up to 48 hours............<12.0 mg/dL  3-5 days..................<15.0 mg/dL  6-29 days.................<15.0 mg/dL           BUN       12         Calcium       8.9         Chloride       100         CO2       26         Creatinine       1.3         Differential Method     Automated           eGFR       >60.0         Eos #     0.1           Eosinophil %     0.8           Glucose       177         Gran # (ANC)     8.0           Gran %     69.8           Hematocrit     25.2           Hemoglobin     7.9                7.8           Immature Grans (Abs)     0.03  Comment: Mild elevation in immature granulocytes is non specific and   can be seen in a variety of conditions including stress response,   acute inflammation, trauma and pregnancy. Correlation with other   laboratory and clinical findings is essential.             Immature Granulocytes     0.3            INR     1.2  Comment: Coumadin Therapy:  2.0 - 3.0 for INR for all indicators except mechanical heart valves  and antiphospholipid syndromes which should use 2.5 - 3.5.             LD       243  Comment: Results are increased in hemolyzed samples.         Lymph #     2.5           Lymph %     21.7           Magnesium        1.6         MCH     23.8           MCHC     31.3           MCV     76           Mono #     0.8           Mono %     7.0           MPV     10.4           nRBC     0           Phosphorus Level       2.6         Platelet Count     297           POCT Glucose 287   178       100       Potassium       3.6         PROTEIN TOTAL       6.6         Protime     12.9           RBC     3.32           RDW     19.4           Sodium       136         WBC     11.45                                  Significant Imaging: I have reviewed all pertinent imaging results/findings within the past 24 hours.

## 2023-11-12 NOTE — SUBJECTIVE & OBJECTIVE
Interval History: NAOEN. ICD site with pressure dressing CDI. No complaints.     Continuous Infusions:   sodium chloride 0.9% Stopped (11/11/23 1021)    insulin regular 1 units/mL infusion orderable (TRANSFER) 1.5 Units/hr (11/12/23 0749)    milrinone 20mg/100ml D5W (200mcg/ml) 0.25 mcg/kg/min (11/11/23 2327)     Scheduled Meds:   aspirin  81 mg Oral Daily    atorvastatin  40 mg Oral Daily    ceFEPime (MAXIPIME) IVPB  2 g Intravenous Q8H    furosemide  80 mg Oral Daily    insulin aspart U-100  4-8 Units Subcutaneous TIDWM    levETIRAcetam  1,000 mg Oral BID    magnesium oxide  400 mg Oral BID    mirtazapine  15 mg Oral Nightly    pantoprazole  40 mg Oral Daily    spironolactone  25 mg Oral Daily    vancomycin (VANCOCIN) IV (PEDS and ADULTS)  1,500 mg Intravenous Q12H     PRN Meds:acetaminophen, dextrose 10%, dextrose 10%, glucagon (human recombinant), glucose, glucose, influenza, insulin aspart U-100, sodium chloride 0.9%, Pharmacy to dose Vancomycin consult **AND** vancomycin - pharmacy to dose    Review of patient's allergies indicates:   Allergen Reactions    Bumex [bumetanide] Hives    Torsemide Hives     Objective:     Vital Signs (Most Recent):  Temp: 98.2 °F (36.8 °C) (11/12/23 0741)  Pulse: (!) 116 (11/12/23 0741)  Resp: 18 (11/12/23 0741)  BP: (!) 82/0 (11/12/23 0817)  SpO2: 100 % (11/12/23 0741) Vital Signs (24h Range):  Temp:  [97.6 °F (36.4 °C)-98.3 °F (36.8 °C)] 98.2 °F (36.8 °C)  Pulse:  [109-122] 116  Resp:  [17-23] 18  SpO2:  [94 %-100 %] 100 %  BP: (74-99)/(0-85) 82/0  Arterial Line BP: (86-99)/(73-85) 99/84     Patient Vitals for the past 72 hrs (Last 3 readings):   Weight   11/12/23 0438 86 kg (189 lb 9.5 oz)   11/11/23 0400 86 kg (189 lb 9.5 oz)   11/10/23 0400 86 kg (189 lb 9.5 oz)       Body mass index is 23.7 kg/m².      Intake/Output Summary (Last 24 hours) at 11/12/2023 0832  Last data filed at 11/12/2023 0522  Gross per 24 hour   Intake 965.6 ml   Output 4150 ml   Net -3184.4 ml                Physical Exam  Constitutional:       Appearance: Normal appearance.   HENT:      Head: Normocephalic and atraumatic.   Cardiovascular:      Rate and Rhythm: Normal rate and regular rhythm.      Pulses: Normal pulses.      Heart sounds: Normal heart sounds.      Comments: LVAD hum smooth  Pulmonary:      Effort: Pulmonary effort is normal.      Breath sounds: Normal breath sounds.   Chest:      Comments: AICD pocket site in L chest wall covered w/ dressing  Abdominal:      General: Bowel sounds are normal. There is no distension.      Palpations: Abdomen is soft.      Tenderness: There is no abdominal tenderness.   Musculoskeletal:         General: Normal range of motion.      Cervical back: Normal range of motion and neck supple.      Right lower leg: No edema.      Left lower leg: No edema.   Skin:     General: Skin is warm and dry.      Capillary Refill: Capillary refill takes 2 to 3 seconds.   Neurological:      General: No focal deficit present.      Mental Status: He is alert and oriented to person, place, and time.   Psychiatric:         Mood and Affect: Mood normal.         Behavior: Behavior normal.            Significant Labs:  CBC:  Recent Labs   Lab 11/10/23  0359 11/10/23  0608 11/11/23  0442 11/11/23  1155 11/11/23  1751 11/11/23  2325 11/12/23  0510   WBC 10.32  --  12.75*  --   --   --  11.45   RBC 3.32*  --  3.16*  --   --   --  3.32*   HGB 7.6*   < > 7.5*   < > 7.8* 7.7* 7.9*  7.8*   HCT 23.9*  --  23.9*  --   --   --  25.2*     --  279  --   --   --  297   MCV 72*  --  76*  --   --   --  76*   MCH 22.9*  --  23.7*  --   --   --  23.8*   MCHC 31.8*  --  31.4*  --   --   --  31.3*    < > = values in this interval not displayed.       BNP:  Recent Labs   Lab 11/08/23  1407   *       CMP:  Recent Labs   Lab 11/10/23  0359 11/11/23  0442 11/11/23  1155 11/11/23  1621 11/12/23  0509   * 260* 284* 201* 177*   CALCIUM 8.9 8.5* 8.5* 8.7 8.9   ALBUMIN 2.8* 2.7*  --   --  2.7*   PROT  6.7 6.4  --   --  6.6   * 136 135* 135* 136   K 4.5 3.3* 3.5 3.6 3.6   CO2 27 26 29 27 26   CL 99 100 99 99 100   BUN 19 17 16 14 12   CREATININE 1.4 1.3 1.2 1.2 1.3   ALKPHOS 109 103  --   --  112   ALT 7* 8*  --   --  8*   AST 21 17  --   --  19   BILITOT 1.2* 2.2*  --   --  0.9        Coagulation:   Recent Labs   Lab 11/10/23  0359 11/11/23 0442 11/12/23  0510   INR 2.7* 1.5* 1.2   APTT 27.5 25.2 23.9       LDH:  Recent Labs   Lab 11/10/23  0359 11/11/23  0442 11/12/23  0509   * 278* 243       Microbiology:  Microbiology Results (last 7 days)       Procedure Component Value Units Date/Time    Blood culture [[id number]] Collected: 11/10/23 0400    Order Status: Completed Specimen: Blood from Peripheral, Wrist, Left Updated: 11/12/23 0612     Blood Culture, Routine No Growth to date      No Growth to date      No Growth to date    Blood culture [[id number]] Collected: 11/10/23 0403    Order Status: Completed Specimen: Blood from Peripheral, Wrist, Right Updated: 11/12/23 0612     Blood Culture, Routine No Growth to date      No Growth to date      No Growth to date    Blood culture x two cultures. Draw prior to antibiotics. [[id number]] Collected: 11/08/23 1407    Order Status: Completed Specimen: Blood from Peripheral, Lower Arm, Left Updated: 11/11/23 1612     Blood Culture, Routine No Growth to date      No Growth to date      No Growth to date      No Growth to date    Narrative:      Aerobic and anaerobic    Blood culture x two cultures. Draw prior to antibiotics. [[id number]] Collected: 11/08/23 1408    Order Status: Completed Specimen: Blood from Peripheral, Antecubital, Left Updated: 11/11/23 1612     Blood Culture, Routine No Growth to date      No Growth to date      No Growth to date      No Growth to date    Narrative:      Aerobic and anaerobic    Aerobic culture [[id number]]  (Abnormal) Collected: 11/09/23 1400    Order Status: Completed Specimen: Incision site from Chest, Left Updated:  11/11/23 1311     Aerobic Bacterial Culture STAPHYLOCOCCUS AUREUS  Rare  Susceptibility pending        STAPHYLOCOCCUS EPIDERMIDIS  Rare  Susceptibility pending      Narrative:      Deep pocket #1    Aerobic culture [[id number]]  (Abnormal) Collected: 11/09/23 1400    Order Status: Completed Specimen: Incision site from Chest, Left Updated: 11/11/23 1310     Aerobic Bacterial Culture STAPHYLOCOCCUS EPIDERMIDIS  Rare  Susceptibility pending      Narrative:      Deep pocket #2    Aerobic culture [[id number]]  (Abnormal) Collected: 11/09/23 1400    Order Status: Completed Specimen: Incision site from Chest, Left Updated: 11/11/23 1309     Aerobic Bacterial Culture STAPHYLOCOCCUS AUREUS  Rare  Susceptibility pending      Narrative:      Superficial pocket #1    Aerobic culture [[id number]]  (Abnormal) Collected: 11/09/23 1415    Order Status: Completed Specimen: Incision site from Chest, Left Updated: 11/11/23 1104     Aerobic Bacterial Culture STAPHYLOCOCCUS AUREUS  Rare  Susceptibility pending  Skin ramesh also present      Narrative:      Superior pocket #1    Aerobic culture [[id number]]  (Abnormal) Collected: 11/09/23 1400    Order Status: Completed Specimen: Incision site from Chest, Left Updated: 11/11/23 0748     Aerobic Bacterial Culture STAPHYLOCOCCUS AUREUS  Rare  Susceptibility pending  Skin ramesh also present      Narrative:      Superficial pocket #2    Aerobic culture [[id number]] Collected: 11/09/23 1430    Order Status: Completed Specimen: Incision site from Chest, Left Updated: 11/10/23 0725     Aerobic Bacterial Culture No growth    Narrative:      Lead tip    Culture, Anaerobe [[id number]] Collected: 11/09/23 1400    Order Status: Completed Specimen: Incision site from Chest, Left Updated: 11/10/23 0608     Anaerobic Culture Culture in progress    Narrative:      Superficial pocket #1    Culture, Anaerobe [[id number]] Collected: 11/09/23 1400    Order Status: Completed Specimen: Incision site from  Chest, Left Updated: 11/10/23 0608     Anaerobic Culture Culture in progress    Narrative:      Deep pocket #1    Culture, Anaerobe [[id number]] Collected: 11/09/23 1400    Order Status: Completed Specimen: Incision site from Chest, Left Updated: 11/10/23 0608     Anaerobic Culture Culture in progress    Narrative:      Deep pocket #2    Culture, Anaerobe [[id number]] Collected: 11/09/23 1415    Order Status: Completed Specimen: Incision site from Chest, Left Updated: 11/10/23 0608     Anaerobic Culture Culture in progress    Narrative:      Superior pocket #1    Culture, Anaerobe [[id number]] Collected: 11/09/23 1400    Order Status: Completed Specimen: Incision site from Chest, Left Updated: 11/10/23 0608     Anaerobic Culture Culture in progress    Narrative:      Superficial pocket #2    Culture, Anaerobe [[id number]] Collected: 11/09/23 1430    Order Status: Completed Specimen: Incision site from Chest, Left Updated: 11/10/23 0608     Anaerobic Culture Culture in progress    Narrative:      Lead tip            I have reviewed all pertinent labs within the past 24 hours.    Estimated Creatinine Clearance: 92.1 mL/min (based on SCr of 1.3 mg/dL).    Diagnostic Results:  I have reviewed all pertinent imaging results/findings within the past 24 hours.

## 2023-11-12 NOTE — PROCEDURES
"Kevan Queen is a 38 y.o. male patient.    Temp: 98.2 °F (36.8 °C) (11/12/23 0741)  Pulse: (!) 116 (11/12/23 0741)  Resp: 18 (11/12/23 0741)  BP: (!) 82/0 (11/12/23 0817)  SpO2: 100 % (11/12/23 0741)  Weight: 86 kg (189 lb 9.5 oz) (11/12/23 0438)  Height: 6' 3" (190.5 cm) (11/09/23 1729)    PICC  Date/Time: 11/12/2023 11:48 AM  Location procedure was performed: [facility] PICC LINE PLACEMENT  Performed by: Rossy Garcia, RN  Assisting provider: Agustín Messer LPN  Consent Done: Yes  Time out: Immediately prior to procedure a time out was called to verify the correct patient, procedure, equipment, support staff and site/side marked as required  Indications: med administration  Anesthesia: local infiltration  Local anesthetic: lidocaine 1% without epinephrine  Anesthetic Total (mL): 3  Preparation: skin prepped with ChloraPrep  Skin prep agent dried: skin prep agent completely dried prior to procedure  Sterile barriers: all five maximum sterile barriers used - cap, mask, sterile gown, sterile gloves, and large sterile sheet  Hand hygiene: hand hygiene performed prior to central venous catheter insertion  Location details: right brachial  Catheter type: double lumen  Catheter size: 5 Fr  Catheter Length: 38cm    Ultrasound guidance: yes  Vessel Caliber: medium and patent, compressibility normal  Vascular Doppler: not done  Needle advanced into vessel with real time Ultrasound guidance.  Guidewire confirmed in vessel.  Image recorded and saved.  Sterile sheath used.  no esophageal manometryNumber of attempts: 1  Post-procedure: blood return through all ports, sterile dressing applied and chlorhexidine patch  Technical procedures used: 3CG  Specimens: No  Implants: No  Assessment: placement verified by x-ray  Complications: none          Name Agustín Messer LPN   11/12/2023    "

## 2023-11-12 NOTE — NURSING
Patient admitted to CSU. Patient arrived to floor from CICU no evidence of distress; patient AAO x4 at this time. Patient on tele. Vital signs obtained. Patient voices no complaints at this time. Plan of care initiated with patient. Bed in lowest position, locked, SR up x2, call bell in reach. LVAD equipment inventoried.

## 2023-11-12 NOTE — SUBJECTIVE & OBJECTIVE
"Interval HPI:   Overnight events: No acute events overnight. Patient in room 309/309 A. Blood glucose stable. BG at and above goal on current insulin regimen (Transition Insulin Drip). Steroid use- None. 3 Days Post-Op  Renal function- Normal   Vasopressors-  None       Endocrine will continue to follow and manage insulin orders inpatient.         Diet diabetic Cardiac (Low Na/Chol); 2000 Calorie; Fluid - 1500mL; Standard Tray     Eatin%  Nausea: No  Hypoglycemia and intervention: No  Fever: No  TPN and/or TF: No    BP (!) 78/0 (BP Location: Right arm, Patient Position: Lying)   Pulse (!) 111   Temp 97.6 °F (36.4 °C) (Oral)   Resp 18   Ht 6' 3" (1.905 m)   Wt 86 kg (189 lb 9.5 oz)   SpO2 96%   BMI 23.70 kg/m²     Labs Reviewed and Include    Recent Labs   Lab 23  0509   *   CALCIUM 8.9   ALBUMIN 2.7*   PROT 6.6      K 3.6   CO2 26      BUN 12   CREATININE 1.3   ALKPHOS 112   ALT 8*   AST 19   BILITOT 0.9     Lab Results   Component Value Date    WBC 11.45 2023    HGB 7.8 (L) 2023    HGB 7.9 (L) 2023    HCT 25.2 (L) 2023    MCV 76 (L) 2023     2023     No results for input(s): "TSH", "FREET4" in the last 168 hours.  Lab Results   Component Value Date    HGBA1C 6.9 (H) 2023       Nutritional status:   Body mass index is 23.7 kg/m².  Lab Results   Component Value Date    ALBUMIN 2.7 (L) 2023    ALBUMIN 2.7 (L) 2023    ALBUMIN 2.8 (L) 11/10/2023     Lab Results   Component Value Date    PREALBUMIN 15 (L) 2023    PREALBUMIN 21 2023    PREALBUMIN 29 2023       Estimated Creatinine Clearance: 92.1 mL/min (based on SCr of 1.3 mg/dL).    Accu-Checks  Recent Labs     11/10/23  1649 11/10/23  2134 11/10/23  2356 23  0441 23  0810 23  1158 23  1613 23  1949 23  2334 23  0356   POCTGLUCOSE 408* 428* 333* 268* 339* 272* 198* 310* 288* 100       Current Medications and/or " Treatments Impacting Glycemic Control  Immunotherapy:    Immunosuppressants       None          Steroids:   Hormones (From admission, onward)      None          Pressors:    Autonomic Drugs (From admission, onward)      None          Hyperglycemia/Diabetes Medications:   Antihyperglycemics (From admission, onward)      Start     Stop Route Frequency Ordered    11/12/23 0715  insulin regular in 0.9 % NaCl 100 unit/100 mL (1 unit/mL) infusion        Question:  Enter initial dose (Units/hr):  Answer:  1.5    -- IV Continuous 11/12/23 0713    11/11/23 1130  insulin aspart U-100 pen 4-8 Units         -- SubQ 3 times daily with meals 11/11/23 0907    11/10/23 1855  insulin aspart U-100 pen 0-10 Units         -- SubQ As needed (PRN) 11/10/23 4429

## 2023-11-12 NOTE — PROGRESS NOTES
11/11/2023  Mirela Perez    Current provider:  Josh Ryan, *    Device interrogation:      11/11/2023     7:59 PM 11/11/2023     4:50 PM 11/11/2023     2:31 PM 11/11/2023     1:01 PM 11/11/2023    12:01 PM 11/11/2023    11:01 AM 11/11/2023    10:01 AM   TXP LVAD INTERROGATIONS   Type HeartMate3 HeartMate3 HeartMate3 HeartMate3 HeartMate3 HeartMate3 HeartMate3   Flow 4.4 4.3 4.5 4.4 4.1 4 4.2   Speed 5100 5100 5100 5100 5100 5100 5100   PI 3.7 3.6 3.8 3.7 4.5 4.9 4.1   Power (Serna) 3.6 3.6 3.5 3.6 3.6 3.5 3.6   LSL 4700         Pulsatility Intermittent pulse                Rounded on Kevan Queen to ensure all mechanical assist device settings (IABP or VAD) were appropriate and all parameters were within limits.  I was able to ensure all back up equipment was present, the staff had no issues, and the Perfusion Department daily rounding was complete.      For implantable VADs: Interrogation of Ventricular assist device was performed with analysis of device parameters and review of device function. I have personally reviewed the interrogation findings and agree with findings as stated.     In emergency, the nursing units have been notified to contact the perfusion department either by:  Calling [phone] from 630am to 4pm Mon thru Fri, utilizing the On-Call Finder functionality of Epic and searching for Perfusion, or by contacting the hospital  from 4pm to 630am and on weekends and asking to speak with the perfusionist on call.    11:48 PM

## 2023-11-12 NOTE — PROGRESS NOTES
[facility] - Cardiology Stepdown  Heart Transplant  Progress Note    Patient Name: Kevan Queen  MRN: [id number]  Admission Date: 11/8/2023  Hospital Length of Stay: 4 days  Attending Physician: Josh Ryan, *  Primary Care Provider: Rosio Armendariz FNP  Principal Problem:ICD (implantable cardioverter-defibrillator) malfunction    Subjective:   Interval History: NAOEN. ICD site with pressure dressing CDI. No complaints.     Continuous Infusions:   sodium chloride 0.9% Stopped (11/11/23 1021)    insulin regular 1 units/mL infusion orderable (TRANSFER) 1.5 Units/hr (11/12/23 0749)    milrinone 20mg/100ml D5W (200mcg/ml) 0.25 mcg/kg/min (11/11/23 2327)     Scheduled Meds:   aspirin  81 mg Oral Daily    atorvastatin  40 mg Oral Daily    ceFEPime (MAXIPIME) IVPB  2 g Intravenous Q8H    furosemide  80 mg Oral Daily    insulin aspart U-100  4-8 Units Subcutaneous TIDWM    levETIRAcetam  1,000 mg Oral BID    magnesium oxide  400 mg Oral BID    mirtazapine  15 mg Oral Nightly    pantoprazole  40 mg Oral Daily    spironolactone  25 mg Oral Daily    vancomycin (VANCOCIN) IV (PEDS and ADULTS)  1,500 mg Intravenous Q12H     PRN Meds:acetaminophen, dextrose 10%, dextrose 10%, glucagon (human recombinant), glucose, glucose, influenza, insulin aspart U-100, sodium chloride 0.9%, Pharmacy to dose Vancomycin consult **AND** vancomycin - pharmacy to dose    Review of patient's allergies indicates:   Allergen Reactions    Bumex [bumetanide] Hives    Torsemide Hives     Objective:     Vital Signs (Most Recent):  Temp: 98.2 °F (36.8 °C) (11/12/23 0741)  Pulse: (!) 116 (11/12/23 0741)  Resp: 18 (11/12/23 0741)  BP: (!) 82/0 (11/12/23 0817)  SpO2: 100 % (11/12/23 0741) Vital Signs (24h Range):  Temp:  [97.6 °F (36.4 °C)-98.3 °F (36.8 °C)] 98.2 °F (36.8 °C)  Pulse:  [109-122] 116  Resp:  [17-23] 18  SpO2:  [94 %-100 %] 100 %  BP: (74-99)/(0-85) 82/0  Arterial Line BP: (86-99)/(73-85) 99/84     Patient Vitals for the past 72 hrs  (Last 3 readings):   Weight   11/12/23 0438 86 kg (189 lb 9.5 oz)   11/11/23 0400 86 kg (189 lb 9.5 oz)   11/10/23 0400 86 kg (189 lb 9.5 oz)       Body mass index is 23.7 kg/m².      Intake/Output Summary (Last 24 hours) at 11/12/2023 0832  Last data filed at 11/12/2023 0522  Gross per 24 hour   Intake 965.6 ml   Output 4150 ml   Net -3184.4 ml               Physical Exam  Constitutional:       Appearance: Normal appearance.   HENT:      Head: Normocephalic and atraumatic.   Cardiovascular:      Rate and Rhythm: Normal rate and regular rhythm.      Pulses: Normal pulses.      Heart sounds: Normal heart sounds.      Comments: LVAD hum smooth  Pulmonary:      Effort: Pulmonary effort is normal.      Breath sounds: Normal breath sounds.   Chest:      Comments: AICD pocket site in L chest wall covered w/ dressing  Abdominal:      General: Bowel sounds are normal. There is no distension.      Palpations: Abdomen is soft.      Tenderness: There is no abdominal tenderness.   Musculoskeletal:         General: Normal range of motion.      Cervical back: Normal range of motion and neck supple.      Right lower leg: No edema.      Left lower leg: No edema.   Skin:     General: Skin is warm and dry.      Capillary Refill: Capillary refill takes 2 to 3 seconds.   Neurological:      General: No focal deficit present.      Mental Status: He is alert and oriented to person, place, and time.   Psychiatric:         Mood and Affect: Mood normal.         Behavior: Behavior normal.            Significant Labs:  CBC:  Recent Labs   Lab 11/10/23  0359 11/10/23  0608 11/11/23  0442 11/11/23  1155 11/11/23  1751 11/11/23  2325 11/12/23  0510   WBC 10.32  --  12.75*  --   --   --  11.45   RBC 3.32*  --  3.16*  --   --   --  3.32*   HGB 7.6*   < > 7.5*   < > 7.8* 7.7* 7.9*  7.8*   HCT 23.9*  --  23.9*  --   --   --  25.2*     --  279  --   --   --  297   MCV 72*  --  76*  --   --   --  76*   MCH 22.9*  --  23.7*  --   --   --  23.8*    MCHC 31.8*  --  31.4*  --   --   --  31.3*    < > = values in this interval not displayed.       BNP:  Recent Labs   Lab 11/08/23  1407   *       CMP:  Recent Labs   Lab 11/10/23  0359 11/11/23  0442 11/11/23  1155 11/11/23  1621 11/12/23  0509   * 260* 284* 201* 177*   CALCIUM 8.9 8.5* 8.5* 8.7 8.9   ALBUMIN 2.8* 2.7*  --   --  2.7*   PROT 6.7 6.4  --   --  6.6   * 136 135* 135* 136   K 4.5 3.3* 3.5 3.6 3.6   CO2 27 26 29 27 26   CL 99 100 99 99 100   BUN 19 17 16 14 12   CREATININE 1.4 1.3 1.2 1.2 1.3   ALKPHOS 109 103  --   --  112   ALT 7* 8*  --   --  8*   AST 21 17  --   --  19   BILITOT 1.2* 2.2*  --   --  0.9        Coagulation:   Recent Labs   Lab 11/10/23  0359 11/11/23  0442 11/12/23  0510   INR 2.7* 1.5* 1.2   APTT 27.5 25.2 23.9       LDH:  Recent Labs   Lab 11/10/23  0359 11/11/23 0442 11/12/23  0509   * 278* 243       Microbiology:  Microbiology Results (last 7 days)       Procedure Component Value Units Date/Time    Blood culture [[id number]] Collected: 11/10/23 0400    Order Status: Completed Specimen: Blood from Peripheral, Wrist, Left Updated: 11/12/23 0612     Blood Culture, Routine No Growth to date      No Growth to date      No Growth to date    Blood culture [[id number]] Collected: 11/10/23 0403    Order Status: Completed Specimen: Blood from Peripheral, Wrist, Right Updated: 11/12/23 0612     Blood Culture, Routine No Growth to date      No Growth to date      No Growth to date    Blood culture x two cultures. Draw prior to antibiotics. [[id number]] Collected: 11/08/23 1407    Order Status: Completed Specimen: Blood from Peripheral, Lower Arm, Left Updated: 11/11/23 1612     Blood Culture, Routine No Growth to date      No Growth to date      No Growth to date      No Growth to date    Narrative:      Aerobic and anaerobic    Blood culture x two cultures. Draw prior to antibiotics. [[id number]] Collected: 11/08/23 1408    Order Status: Completed Specimen: Blood  from Peripheral, Antecubital, Left Updated: 11/11/23 1612     Blood Culture, Routine No Growth to date      No Growth to date      No Growth to date      No Growth to date    Narrative:      Aerobic and anaerobic    Aerobic culture [[id number]]  (Abnormal) Collected: 11/09/23 1400    Order Status: Completed Specimen: Incision site from Chest, Left Updated: 11/11/23 1311     Aerobic Bacterial Culture STAPHYLOCOCCUS AUREUS  Rare  Susceptibility pending        STAPHYLOCOCCUS EPIDERMIDIS  Rare  Susceptibility pending      Narrative:      Deep pocket #1    Aerobic culture [[id number]]  (Abnormal) Collected: 11/09/23 1400    Order Status: Completed Specimen: Incision site from Chest, Left Updated: 11/11/23 1310     Aerobic Bacterial Culture STAPHYLOCOCCUS EPIDERMIDIS  Rare  Susceptibility pending      Narrative:      Deep pocket #2    Aerobic culture [[id number]]  (Abnormal) Collected: 11/09/23 1400    Order Status: Completed Specimen: Incision site from Chest, Left Updated: 11/11/23 1309     Aerobic Bacterial Culture STAPHYLOCOCCUS AUREUS  Rare  Susceptibility pending      Narrative:      Superficial pocket #1    Aerobic culture [[id number]]  (Abnormal) Collected: 11/09/23 1415    Order Status: Completed Specimen: Incision site from Chest, Left Updated: 11/11/23 1104     Aerobic Bacterial Culture STAPHYLOCOCCUS AUREUS  Rare  Susceptibility pending  Skin ramesh also present      Narrative:      Superior pocket #1    Aerobic culture [[id number]]  (Abnormal) Collected: 11/09/23 1400    Order Status: Completed Specimen: Incision site from Chest, Left Updated: 11/11/23 0748     Aerobic Bacterial Culture STAPHYLOCOCCUS AUREUS  Rare  Susceptibility pending  Skin ramesh also present      Narrative:      Superficial pocket #2    Aerobic culture [[id number]] Collected: 11/09/23 1430    Order Status: Completed Specimen: Incision site from Chest, Left Updated: 11/10/23 0725     Aerobic Bacterial Culture No growth    Narrative:       Lead tip    Culture, Anaerobe [[id number]] Collected: 11/09/23 1400    Order Status: Completed Specimen: Incision site from Chest, Left Updated: 11/10/23 0608     Anaerobic Culture Culture in progress    Narrative:      Superficial pocket #1    Culture, Anaerobe [[id number]] Collected: 11/09/23 1400    Order Status: Completed Specimen: Incision site from Chest, Left Updated: 11/10/23 0608     Anaerobic Culture Culture in progress    Narrative:      Deep pocket #1    Culture, Anaerobe [[id number]] Collected: 11/09/23 1400    Order Status: Completed Specimen: Incision site from Chest, Left Updated: 11/10/23 0608     Anaerobic Culture Culture in progress    Narrative:      Deep pocket #2    Culture, Anaerobe [[id number]] Collected: 11/09/23 1415    Order Status: Completed Specimen: Incision site from Chest, Left Updated: 11/10/23 0608     Anaerobic Culture Culture in progress    Narrative:      Superior pocket #1    Culture, Anaerobe [[id number]] Collected: 11/09/23 1400    Order Status: Completed Specimen: Incision site from Chest, Left Updated: 11/10/23 0608     Anaerobic Culture Culture in progress    Narrative:      Superficial pocket #2    Culture, Anaerobe [[id number]] Collected: 11/09/23 1430    Order Status: Completed Specimen: Incision site from Chest, Left Updated: 11/10/23 0608     Anaerobic Culture Culture in progress    Narrative:      Lead tip            I have reviewed all pertinent labs within the past 24 hours.    Estimated Creatinine Clearance: 92.1 mL/min (based on SCr of 1.3 mg/dL).    Diagnostic Results:  I have reviewed all pertinent imaging results/findings within the past 24 hours.  Assessment and Plan:     Kevan Queen is a 38 y.o. male on LVAD presenting with c/o defibrillator coming out of his chest. He stated that this started a few months ago. He also c/o that his picc line looks longer that the previous ones he has had. The picc line is functioning and there is no irritation around it. He  uses it daily for his milrinone infusion. He states that his defibrillator has been coming out of his chest for around a week now. He denied chest trauma, fever, nausea, vomiting or diarrhea. Breathing is at baseline and comfortable at rest. Denies orthopnea or PND or LVAD alarms.        * ICD (implantable cardioverter-defibrillator) malfunction  Patient presented with [employer] AICD falling out of L chest wall. Apparently had been this way for 1 week  Unclear reason for dehiscence of ICD site  BCx's pending. Continue cefepime and Vanc  EP extracted his device, complicated by hematoma so wound vac removed. Hemostasis achieved with general surgery help. Will continue to monitor no interventions planned at the moment.     LVAD (left ventricular assist device) present  -HeartMate 3 Implanted on 6/29/2022 as DT with RV failure on milrinone at 0.25 mcg/kg/min.  -Restart Coumadin, Goal INR 2.0-3.0. subtherapeutic  -LDH is stable.  Will continue to monitor daily  -Speed set at 5100, LSL 4700 rpm  Review of device function is stable/unstable stable        11/12/2023     7:34 AM 11/12/2023     4:10 AM 11/11/2023    11:35 PM 11/11/2023     7:59 PM 11/11/2023     4:50 PM 11/11/2023     2:31 PM 11/11/2023     1:01 PM   TXP LVAD INTERROGATIONS   Type HeartMate3 HeartMate3 HeartMate3 HeartMate3 HeartMate3 HeartMate3 HeartMate3   Flow 4.2 4.1 4.2 4.4 4.3 4.5 4.4   Speed 5150 5100 5100 5100 5100 5100 5100   PI 4.1 4.9 4.2 3.7 3.6 3.8 3.7   Power (Serna) 3.6 3.5 3.6 3.6 3.6 3.5 3.6   LSL 4700  4700 4700      Pulsatility  Intermittent pulse Intermittent pulse Intermittent pulse          Seizure-like activity  Continue keppra    Type 2 diabetes mellitus with hyperglycemia  Sliding scale insulin        Fausto Reyes, NP  Heart Transplant  [facility] - Cardiology Step[facility]

## 2023-11-12 NOTE — PROGRESS NOTES
"   11/12/23 1250        VAD 06/29/22 1115 Left ventricular assist device HeartMate 3   Placement Date/Time: 06/29/22 1115   Present Prior to Hospital Arrival?: No  Inserted by: MD  VAD Type: Left ventricular assist device  VAD Brand: HeartMate 3   Site Location Abdomen right   Site Assessment Clean;Dry;Intact   Driveline Exit Site 1   Driveline Assessment Free of Kinks;Intact;Modular cable Clean and Locked   Dressing Status Clean;Dry;Intact   Dressing Intervention Sterile dressing change   Performed By RN   Dressing Change Schedule Daily   Dressing Change Due 11/13/23   Driveline [place] in use [place] askew   Condition CDI   Date changed 11/12/23     LVAD dressing change completed using sterile technique with kit. DLES is a "1" with minimal drainage noted on the drain sponge. Tolerated without any complication.No redness, or tenderness noted.   "

## 2023-11-12 NOTE — PLAN OF CARE
Problem: Adult Inpatient Plan of Care  Goal: Patient-Specific Goal (Individualized)  Outcome: Ongoing, Progressing  Flowsheets (Taken 11/11/2023 1950)  Anxieties, Fears or Concerns: infection  Individualized Care Needs: IV ABx     Problem: Diabetes Comorbidity  Goal: Blood Glucose Level Within Targeted Range  Outcome: Ongoing, Progressing  Intervention: Monitor and Manage Glycemia  Flowsheets (Taken 11/11/2023 1950)  Glycemic Management:   blood glucose monitored   supplemental insulin given     Problem: Infection  Goal: Absence of Infection Signs and Symptoms  Outcome: Ongoing, Progressing  Intervention: Prevent or Manage Infection  Flowsheets (Taken 11/11/2023 1950)  Infection Management: aseptic technique maintained       Plan of care discussed with patient.  Patient ambulating independently, fall precautions in place. LVAD DP and numbers WNL, smooth LVAD hum. Milrinone and insulin infusing per order. IV Abx administered. BG monitored and supplemental insulin administered. Pt complained of cramping at end of shift-Dr Herndon notified and heat packs applied. Dr Herndon placed orders to recheck BMP/Mg. Added on labs to previously drawn green top tube. Results pending. Heat resolved cramping at this time.Discussed medications and care.  Patient has no questions at this time.

## 2023-11-12 NOTE — ASSESSMENT & PLAN NOTE
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  -Restart Coumadin, Goal INR 2.0-3.0. subtherapeutic  -LDH is stable.  Will continue to monitor daily  -Speed set at 5100, LSL 4700 rpm  Review of device function is stable/unstable stable        11/12/2023     7:34 AM 11/12/2023     4:10 AM 11/11/2023    11:35 PM 11/11/2023     7:59 PM 11/11/2023     4:50 PM 11/11/2023     2:31 PM 11/11/2023     1:01 PM   TXP LVAD INTERROGATIONS   Type HeartMate3 HeartMate3 HeartMate3 HeartMate3 HeartMate3 HeartMate3 HeartMate3   Flow 4.2 4.1 4.2 4.4 4.3 4.5 4.4   Speed 5150 5100 5100 5100 5100 5100 5100   PI 4.1 4.9 4.2 3.7 3.6 3.8 3.7   Power (Serna) 3.6 3.5 3.6 3.6 3.6 3.5 3.6   LSL 4700  4700 4700      Pulsatility  Intermittent pulse Intermittent pulse Intermittent pulse

## 2023-11-12 NOTE — CONSULTS
D/L PICC placed in right brachial vein, 38cm in length with 0cm exposed. Arm circumference 34cm. Lot#[id number]

## 2023-11-12 NOTE — PROGRESS NOTES
11/12/2023  Mirela Perez    Current provider:  Josh Ryan, *    Device interrogation:      11/11/2023    11:35 PM 11/11/2023     7:59 PM 11/11/2023     4:50 PM 11/11/2023     2:31 PM 11/11/2023     1:01 PM 11/11/2023    12:01 PM 11/11/2023    11:01 AM   TXP LVAD INTERROGATIONS   Type  HeartMate3 HeartMate3 HeartMate3 HeartMate3 HeartMate3 HeartMate3   Flow 4.2 4.4 4.3 4.5 4.4 4.1 4   Speed 5100 5100 5100 5100 5100 5100 5100   PI 4.2 3.7 3.6 3.8 3.7 4.5 4.9   Power (Serna) 3.6 3.6 3.6 3.5 3.6 3.6 3.5   LSL 4700 4700        Pulsatility Intermittent pulse Intermittent pulse               Rounded on Kevan Queen to ensure all mechanical assist device settings (IABP or VAD) were appropriate and all parameters were within limits.  I was able to ensure all back up equipment was present, the staff had no issues, and the Perfusion Department daily rounding was complete.      For implantable VADs: Interrogation of Ventricular assist device was performed with analysis of device parameters and review of device function. I have personally reviewed the interrogation findings and agree with findings as stated.     In emergency, the nursing units have been notified to contact the perfusion department either by:  Calling [phone] from 630am to 4pm Mon thru Fri, utilizing the On-Call Finder functionality of Epic and searching for Perfusion, or by contacting the hospital  from 4pm to 630am and on weekends and asking to speak with the perfusionist on call.    12:15 AM

## 2023-11-13 PROBLEM — T82.9XXA COMPLICATIONS DUE TO AUTOMATIC IMPLANTABLE CARDIOVERTER-DEFIBRILLATOR: Status: ACTIVE | Noted: 2023-07-23

## 2023-11-13 LAB
ALBUMIN SERPL BCP-MCNC: 2.9 G/DL (ref 3.5–5.2)
ALBUMIN SERPL BCP-MCNC: 2.9 G/DL (ref 3.5–5.2)
ALP SERPL-CCNC: 131 U/L (ref 55–135)
ALP SERPL-CCNC: 131 U/L (ref 55–135)
ALT SERPL W/O P-5'-P-CCNC: 15 U/L (ref 10–44)
ALT SERPL W/O P-5'-P-CCNC: 15 U/L (ref 10–44)
ANION GAP SERPL CALC-SCNC: 10 MMOL/L (ref 8–16)
APTT PPP: 22.7 SEC (ref 21–32)
AST SERPL-CCNC: 25 U/L (ref 10–40)
AST SERPL-CCNC: 25 U/L (ref 10–40)
BACTERIA BLD CULT: NORMAL
BACTERIA BLD CULT: NORMAL
BACTERIA SPEC AEROBE CULT: ABNORMAL
BACTERIA SPEC AEROBE CULT: NO GROWTH
BACTERIA SPEC ANAEROBE CULT: NORMAL
BASOPHILS # BLD AUTO: 0.05 K/UL (ref 0–0.2)
BASOPHILS NFR BLD: 0.4 % (ref 0–1.9)
BILIRUB DIRECT SERPL-MCNC: 0.4 MG/DL (ref 0.1–0.3)
BILIRUB SERPL-MCNC: 0.8 MG/DL (ref 0.1–1)
BILIRUB SERPL-MCNC: 0.8 MG/DL (ref 0.1–1)
BLD PROD TYP BPU: NORMAL
BLOOD UNIT EXPIRATION DATE: NORMAL
BLOOD UNIT TYPE CODE: 6200
BLOOD UNIT TYPE: NORMAL
BNP SERPL-MCNC: 116 PG/ML (ref 0–99)
BUN SERPL-MCNC: 13 MG/DL (ref 6–20)
CALCIUM SERPL-MCNC: 9.1 MG/DL (ref 8.7–10.5)
CHLORIDE SERPL-SCNC: 98 MMOL/L (ref 95–110)
CO2 SERPL-SCNC: 28 MMOL/L (ref 23–29)
CODING SYSTEM: NORMAL
CREAT SERPL-MCNC: 1.2 MG/DL (ref 0.5–1.4)
CROSSMATCH INTERPRETATION: NORMAL
CRP SERPL-MCNC: 1.9 MG/L (ref 0–8.2)
DIFFERENTIAL METHOD: ABNORMAL
DISPENSE STATUS: NORMAL
EOSINOPHIL # BLD AUTO: 0.1 K/UL (ref 0–0.5)
EOSINOPHIL NFR BLD: 0.9 % (ref 0–8)
ERYTHROCYTE [DISTWIDTH] IN BLOOD BY AUTOMATED COUNT: 20.5 % (ref 11.5–14.5)
EST. GFR  (NO RACE VARIABLE): >60 ML/MIN/1.73 M^2
ESTIMATED AVG GLUCOSE: 209 MG/DL (ref 68–131)
GLUCOSE SERPL-MCNC: 175 MG/DL (ref 70–110)
HBA1C MFR BLD: 8.9 % (ref 4–5.6)
HCT VFR BLD AUTO: 26 % (ref 40–54)
HGB BLD-MCNC: 8.1 G/DL (ref 14–18)
IMM GRANULOCYTES # BLD AUTO: 0.04 K/UL (ref 0–0.04)
IMM GRANULOCYTES NFR BLD AUTO: 0.3 % (ref 0–0.5)
INR PPP: 1.1 (ref 0.8–1.2)
LDH SERPL L TO P-CCNC: 261 U/L (ref 110–260)
LYMPHOCYTES # BLD AUTO: 2.8 K/UL (ref 1–4.8)
LYMPHOCYTES NFR BLD: 22.9 % (ref 18–48)
MAGNESIUM SERPL-MCNC: 1.9 MG/DL (ref 1.6–2.6)
MCH RBC QN AUTO: 23.7 PG (ref 27–31)
MCHC RBC AUTO-ENTMCNC: 31.2 G/DL (ref 32–36)
MCV RBC AUTO: 76 FL (ref 82–98)
MONOCYTES # BLD AUTO: 1 K/UL (ref 0.3–1)
MONOCYTES NFR BLD: 8.2 % (ref 4–15)
NEUTROPHILS # BLD AUTO: 8.3 K/UL (ref 1.8–7.7)
NEUTROPHILS NFR BLD: 67.3 % (ref 38–73)
NRBC BLD-RTO: 0 /100 WBC
NUM UNITS TRANS PACKED RBC: NORMAL
PHOSPHATE SERPL-MCNC: 2.6 MG/DL (ref 2.7–4.5)
PLATELET # BLD AUTO: 338 K/UL (ref 150–450)
PMV BLD AUTO: 10.8 FL (ref 9.2–12.9)
POCT GLUCOSE: 125 MG/DL (ref 70–110)
POCT GLUCOSE: 177 MG/DL (ref 70–110)
POCT GLUCOSE: 184 MG/DL (ref 70–110)
POCT GLUCOSE: 194 MG/DL (ref 70–110)
POCT GLUCOSE: 272 MG/DL (ref 70–110)
POCT GLUCOSE: 336 MG/DL (ref 70–110)
POTASSIUM SERPL-SCNC: 4.3 MMOL/L (ref 3.5–5.1)
PREALB SERPL-MCNC: 17 MG/DL (ref 20–43)
PROT SERPL-MCNC: 7.1 G/DL (ref 6–8.4)
PROT SERPL-MCNC: 7.1 G/DL (ref 6–8.4)
PROTHROMBIN TIME: 11.6 SEC (ref 9–12.5)
RBC # BLD AUTO: 3.42 M/UL (ref 4.6–6.2)
SODIUM SERPL-SCNC: 136 MMOL/L (ref 136–145)
VANCOMYCIN TROUGH SERPL-MCNC: 16.4 UG/ML (ref 10–22)
WBC # BLD AUTO: 12.26 K/UL (ref 3.9–12.7)

## 2023-11-13 PROCEDURE — 99232 SBSQ HOSP IP/OBS MODERATE 35: CPT | Mod: ,,,

## 2023-11-13 PROCEDURE — 80202 ASSAY OF VANCOMYCIN: CPT | Performed by: INTERNAL MEDICINE

## 2023-11-13 PROCEDURE — 25000003 PHARM REV CODE 250: Performed by: STUDENT IN AN ORGANIZED HEALTH CARE EDUCATION/TRAINING PROGRAM

## 2023-11-13 PROCEDURE — 85025 COMPLETE CBC W/AUTO DIFF WBC: CPT | Performed by: NURSE PRACTITIONER

## 2023-11-13 PROCEDURE — 99231 SBSQ HOSP IP/OBS SF/LOW 25: CPT | Mod: ,,, | Performed by: STUDENT IN AN ORGANIZED HEALTH CARE EDUCATION/TRAINING PROGRAM

## 2023-11-13 PROCEDURE — 99231 PR SUBSEQUENT HOSPITAL CARE,LEVL I: ICD-10-PCS | Mod: ,,, | Performed by: STUDENT IN AN ORGANIZED HEALTH CARE EDUCATION/TRAINING PROGRAM

## 2023-11-13 PROCEDURE — 99233 SBSQ HOSP IP/OBS HIGH 50: CPT | Mod: FS,,, | Performed by: REGISTERED NURSE

## 2023-11-13 PROCEDURE — 83036 HEMOGLOBIN GLYCOSYLATED A1C: CPT | Performed by: INTERNAL MEDICINE

## 2023-11-13 PROCEDURE — 99232 PR SUBSEQUENT HOSPITAL CARE,LEVL II: ICD-10-PCS | Mod: ,,,

## 2023-11-13 PROCEDURE — 27000248 HC VAD-ADDITIONAL DAY

## 2023-11-13 PROCEDURE — 93750 PR INTERROGATE VENT ASSIST DEV, IN PERSON, W PHYSICIAN ANALYSIS: ICD-10-PCS | Mod: ,,, | Performed by: INTERNAL MEDICINE

## 2023-11-13 PROCEDURE — A4216 STERILE WATER/SALINE, 10 ML: HCPCS | Performed by: INTERNAL MEDICINE

## 2023-11-13 PROCEDURE — 83735 ASSAY OF MAGNESIUM: CPT | Performed by: NURSE PRACTITIONER

## 2023-11-13 PROCEDURE — 99233 PR SUBSEQUENT HOSPITAL CARE,LEVL III: ICD-10-PCS | Mod: FS,,, | Performed by: REGISTERED NURSE

## 2023-11-13 PROCEDURE — 99233 SBSQ HOSP IP/OBS HIGH 50: CPT | Mod: ,,, | Performed by: INTERNAL MEDICINE

## 2023-11-13 PROCEDURE — 63600175 PHARM REV CODE 636 W HCPCS: Performed by: NURSE PRACTITIONER

## 2023-11-13 PROCEDURE — 20600001 HC STEP DOWN PRIVATE ROOM

## 2023-11-13 PROCEDURE — 25000003 PHARM REV CODE 250: Performed by: INTERNAL MEDICINE

## 2023-11-13 PROCEDURE — 25000003 PHARM REV CODE 250

## 2023-11-13 PROCEDURE — 85610 PROTHROMBIN TIME: CPT | Performed by: NURSE PRACTITIONER

## 2023-11-13 PROCEDURE — 86140 C-REACTIVE PROTEIN: CPT | Performed by: NURSE PRACTITIONER

## 2023-11-13 PROCEDURE — 63600175 PHARM REV CODE 636 W HCPCS: Performed by: INTERNAL MEDICINE

## 2023-11-13 PROCEDURE — 85730 THROMBOPLASTIN TIME PARTIAL: CPT | Performed by: NURSE PRACTITIONER

## 2023-11-13 PROCEDURE — 63600175 PHARM REV CODE 636 W HCPCS

## 2023-11-13 PROCEDURE — 93750 INTERROGATION VAD IN PERSON: CPT | Mod: ,,, | Performed by: INTERNAL MEDICINE

## 2023-11-13 PROCEDURE — 63600175 PHARM REV CODE 636 W HCPCS: Performed by: STUDENT IN AN ORGANIZED HEALTH CARE EDUCATION/TRAINING PROGRAM

## 2023-11-13 PROCEDURE — 83615 LACTATE (LD) (LDH) ENZYME: CPT | Performed by: NURSE PRACTITIONER

## 2023-11-13 PROCEDURE — 25000003 PHARM REV CODE 250: Performed by: NURSE PRACTITIONER

## 2023-11-13 PROCEDURE — 99233 PR SUBSEQUENT HOSPITAL CARE,LEVL III: ICD-10-PCS | Mod: ,,, | Performed by: INTERNAL MEDICINE

## 2023-11-13 PROCEDURE — 84134 ASSAY OF PREALBUMIN: CPT | Performed by: NURSE PRACTITIONER

## 2023-11-13 PROCEDURE — 80053 COMPREHEN METABOLIC PANEL: CPT | Performed by: NURSE PRACTITIONER

## 2023-11-13 PROCEDURE — 84100 ASSAY OF PHOSPHORUS: CPT | Performed by: NURSE PRACTITIONER

## 2023-11-13 PROCEDURE — 83880 ASSAY OF NATRIURETIC PEPTIDE: CPT | Performed by: NURSE PRACTITIONER

## 2023-11-13 RX ORDER — IBUPROFEN 200 MG
16 TABLET ORAL
Status: DISCONTINUED | OUTPATIENT
Start: 2023-11-13 | End: 2023-11-15 | Stop reason: HOSPADM

## 2023-11-13 RX ORDER — GLUCAGON 1 MG
1 KIT INJECTION
Status: DISCONTINUED | OUTPATIENT
Start: 2023-11-13 | End: 2023-11-15 | Stop reason: HOSPADM

## 2023-11-13 RX ORDER — WARFARIN 7.5 MG/1
7.5 TABLET ORAL DAILY
Status: DISCONTINUED | OUTPATIENT
Start: 2023-11-13 | End: 2023-11-15 | Stop reason: HOSPADM

## 2023-11-13 RX ORDER — INSULIN ASPART 100 [IU]/ML
8 INJECTION, SOLUTION INTRAVENOUS; SUBCUTANEOUS
Status: DISCONTINUED | OUTPATIENT
Start: 2023-11-13 | End: 2023-11-14

## 2023-11-13 RX ORDER — INSULIN ASPART 100 [IU]/ML
8 INJECTION, SOLUTION INTRAVENOUS; SUBCUTANEOUS
Status: DISCONTINUED | OUTPATIENT
Start: 2023-11-13 | End: 2023-11-13

## 2023-11-13 RX ORDER — DEXTROSE 40 %
24 GEL (GRAM) ORAL
Status: DISCONTINUED | OUTPATIENT
Start: 2023-11-13 | End: 2023-11-15 | Stop reason: HOSPADM

## 2023-11-13 RX ORDER — AMOXICILLIN 250 MG
1 CAPSULE ORAL 2 TIMES DAILY
Status: DISCONTINUED | OUTPATIENT
Start: 2023-11-14 | End: 2023-11-14

## 2023-11-13 RX ORDER — DEXTROSE 40 %
16 GEL (GRAM) ORAL
Status: DISCONTINUED | OUTPATIENT
Start: 2023-11-13 | End: 2023-11-15 | Stop reason: HOSPADM

## 2023-11-13 RX ORDER — IBUPROFEN 200 MG
24 TABLET ORAL
Status: DISCONTINUED | OUTPATIENT
Start: 2023-11-13 | End: 2023-11-13

## 2023-11-13 RX ADMIN — Medication 10 ML: at 05:11

## 2023-11-13 RX ADMIN — MIRTAZAPINE 15 MG: 15 TABLET, FILM COATED ORAL at 08:11

## 2023-11-13 RX ADMIN — INSULIN ASPART 8 UNITS: 100 INJECTION, SOLUTION INTRAVENOUS; SUBCUTANEOUS at 05:11

## 2023-11-13 RX ADMIN — Medication 10 ML: at 11:11

## 2023-11-13 RX ADMIN — MILRINONE LACTATE IN DEXTROSE 0.25 MCG/KG/MIN: 200 INJECTION, SOLUTION INTRAVENOUS at 12:11

## 2023-11-13 RX ADMIN — LEVETIRACETAM 1000 MG: 500 TABLET, FILM COATED ORAL at 08:11

## 2023-11-13 RX ADMIN — DAPTOMYCIN 690 MG: 350 INJECTION, POWDER, LYOPHILIZED, FOR SOLUTION INTRAVENOUS at 06:11

## 2023-11-13 RX ADMIN — INSULIN ASPART 8 UNITS: 100 INJECTION, SOLUTION INTRAVENOUS; SUBCUTANEOUS at 08:11

## 2023-11-13 RX ADMIN — FUROSEMIDE 80 MG: 80 TABLET ORAL at 08:11

## 2023-11-13 RX ADMIN — METHOCARBAMOL 500 MG: 500 TABLET ORAL at 01:11

## 2023-11-13 RX ADMIN — Medication 10 ML: at 12:11

## 2023-11-13 RX ADMIN — SPIRONOLACTONE 25 MG: 25 TABLET ORAL at 08:11

## 2023-11-13 RX ADMIN — INSULIN ASPART 4 UNITS: 100 INJECTION, SOLUTION INTRAVENOUS; SUBCUTANEOUS at 07:11

## 2023-11-13 RX ADMIN — METHOCARBAMOL 500 MG: 500 TABLET ORAL at 08:11

## 2023-11-13 RX ADMIN — INSULIN DETEMIR 18 UNITS: 100 INJECTION, SOLUTION SUBCUTANEOUS at 11:11

## 2023-11-13 RX ADMIN — INSULIN ASPART 2 UNITS: 100 INJECTION, SOLUTION INTRAVENOUS; SUBCUTANEOUS at 12:11

## 2023-11-13 RX ADMIN — INSULIN DETEMIR 18 UNITS: 100 INJECTION, SOLUTION SUBCUTANEOUS at 08:11

## 2023-11-13 RX ADMIN — VANCOMYCIN HYDROCHLORIDE 1500 MG: 1.5 INJECTION, POWDER, LYOPHILIZED, FOR SOLUTION INTRAVENOUS at 05:11

## 2023-11-13 RX ADMIN — INSULIN ASPART 8 UNITS: 100 INJECTION, SOLUTION INTRAVENOUS; SUBCUTANEOUS at 01:11

## 2023-11-13 RX ADMIN — ASPIRIN 81 MG: 81 TABLET, COATED ORAL at 08:11

## 2023-11-13 RX ADMIN — METHOCARBAMOL 500 MG: 500 TABLET ORAL at 05:11

## 2023-11-13 RX ADMIN — MILRINONE LACTATE IN DEXTROSE 0.25 MCG/KG/MIN: 200 INJECTION, SOLUTION INTRAVENOUS at 02:11

## 2023-11-13 RX ADMIN — ATORVASTATIN CALCIUM 40 MG: 40 TABLET, FILM COATED ORAL at 08:11

## 2023-11-13 RX ADMIN — Medication 400 MG: at 08:11

## 2023-11-13 RX ADMIN — PANTOPRAZOLE SODIUM 40 MG: 40 TABLET, DELAYED RELEASE ORAL at 08:11

## 2023-11-13 RX ADMIN — WARFARIN SODIUM 7.5 MG: 7.5 TABLET ORAL at 05:11

## 2023-11-13 NOTE — SUBJECTIVE & OBJECTIVE
Interval History: NAOEN. Wound care consulted today and packed with wet to dry dressing. No active bleeding noted. H/H stable. Will need lifevest prior to discharge. Blood cx NGTD. Wound cx with staph areus. Remains on Vancomycin.     Continuous Infusions:   sodium chloride 0.9% Stopped (11/11/23 1021)    milrinone 20mg/100ml D5W (200mcg/ml) 0.25 mcg/kg/min (11/13/23 1427)     Scheduled Meds:   aspirin  81 mg Oral Daily    atorvastatin  40 mg Oral Daily    furosemide  80 mg Oral Daily    insulin aspart U-100  8 Units Subcutaneous TIDWM    insulin detemir U-100  18 Units Subcutaneous BID    levETIRAcetam  1,000 mg Oral BID    magnesium oxide  400 mg Oral BID    methocarbamoL  500 mg Oral QID    mirtazapine  15 mg Oral Nightly    pantoprazole  40 mg Oral Daily    sodium chloride 0.9%  10 mL Intravenous Q6H    spironolactone  25 mg Oral Daily    vancomycin (VANCOCIN) IV (PEDS and ADULTS)  1,500 mg Intravenous Q12H     PRN Meds:acetaminophen, dextrose 10%, dextrose 10%, dextrose 10%, dextrose 10%, dextrose, dextrose, glucagon (human recombinant), glucose, influenza, sodium chloride 0.9%, Flushing PICC/Midline Protocol **AND** sodium chloride 0.9% **AND** sodium chloride 0.9%, Pharmacy to dose Vancomycin consult **AND** vancomycin - pharmacy to dose    Review of patient's allergies indicates:   Allergen Reactions    Bumex [bumetanide] Hives    Torsemide Hives     Objective:     Vital Signs (Most Recent):  Temp: 98.2 °F (36.8 °C) (11/13/23 1141)  Pulse: 72 (11/13/23 1141)  Resp: 20 (11/13/23 1141)  BP: (!) 76/0 (11/13/23 1141)  SpO2: 100 % (11/13/23 1141) Vital Signs (24h Range):  Temp:  [98.1 °F (36.7 °C)-98.6 °F (37 °C)] 98.2 °F (36.8 °C)  Pulse:  [] 72  Resp:  [18-20] 20  SpO2:  [97 %-100 %] 100 %  BP: (76-88)/(0) 76/0     Patient Vitals for the past 72 hrs (Last 3 readings):   Weight   11/12/23 0438 86 kg (189 lb 9.5 oz)   11/11/23 0400 86 kg (189 lb 9.5 oz)       Body mass index is 23.7  kg/m².      Intake/Output Summary (Last 24 hours) at 11/13/2023 1453  Last data filed at 11/13/2023 1313  Gross per 24 hour   Intake 864 ml   Output 2200 ml   Net -1336 ml       CVP:  [15 mmHg] 15 mmHg       Physical Exam  Constitutional:       Appearance: Normal appearance.   HENT:      Head: Normocephalic and atraumatic.   Cardiovascular:      Rate and Rhythm: Normal rate and regular rhythm.      Pulses: Normal pulses.      Heart sounds: Normal heart sounds.      Comments: LVAD hum smooth  Pulmonary:      Effort: Pulmonary effort is normal.      Breath sounds: Normal breath sounds.   Chest:      Comments: AICD pocket site in L chest wall covered w/ dressing  Abdominal:      General: Bowel sounds are normal. There is no distension.      Palpations: Abdomen is soft.      Tenderness: There is no abdominal tenderness.   Musculoskeletal:         General: Normal range of motion.      Cervical back: Normal range of motion and neck supple.      Right lower leg: No edema.      Left lower leg: No edema.   Skin:     General: Skin is warm and dry.      Capillary Refill: Capillary refill takes 2 to 3 seconds.   Neurological:      General: No focal deficit present.      Mental Status: He is alert and oriented to person, place, and time.   Psychiatric:         Mood and Affect: Mood normal.         Behavior: Behavior normal.            Significant Labs:  CBC:  Recent Labs   Lab 11/11/23  0442 11/11/23  1155 11/12/23  0510 11/12/23  1303 11/13/23  0407   WBC 12.75*  --  11.45  --  12.26   RBC 3.16*  --  3.32*  --  3.42*   HGB 7.5*   < > 7.9*  7.8* 8.1* 8.1*   HCT 23.9*  --  25.2*  --  26.0*     --  297  --  338   MCV 76*  --  76*  --  76*   MCH 23.7*  --  23.8*  --  23.7*   MCHC 31.4*  --  31.3*  --  31.2*    < > = values in this interval not displayed.       BNP:  Recent Labs   Lab 11/08/23  1407 11/13/23  0407   * 116*       CMP:  Recent Labs   Lab 11/11/23  0442 11/11/23  1155 11/12/23  0509 11/12/23  7277  11/13/23  0407   *   < > 177* 192* 175*   CALCIUM 8.5*   < > 8.9 8.9 9.1   ALBUMIN 2.7*  --  2.7*  --  2.9*  2.9*   PROT 6.4  --  6.6  --  7.1  7.1      < > 136 135* 136   K 3.3*   < > 3.6 4.1 4.3   CO2 26   < > 26 30* 28      < > 100 97 98   BUN 17   < > 12 14 13   CREATININE 1.3   < > 1.3 1.4 1.2   ALKPHOS 103  --  112  --  131  131   ALT 8*  --  8*  --  15  15   AST 17  --  19  --  25  25   BILITOT 2.2*  --  0.9  --  0.8  0.8    < > = values in this interval not displayed.        Coagulation:   Recent Labs   Lab 11/11/23  0442 11/12/23  0510 11/13/23  0407   INR 1.5* 1.2 1.1   APTT 25.2 23.9 22.7       LDH:  Recent Labs   Lab 11/11/23  0442 11/12/23  0509 11/13/23  0407   * 243 261*       Microbiology:  Microbiology Results (last 7 days)       Procedure Component Value Units Date/Time    Culture, Anaerobe [[id number]] Collected: 11/09/23 1430    Order Status: Completed Specimen: Incision site from Chest, Left Updated: 11/13/23 1350     Anaerobic Culture No anaerobes isolated    Narrative:      Lead tip    Culture, Anaerobe [[id number]] Collected: 11/09/23 1415    Order Status: Completed Specimen: Incision site from Chest, Left Updated: 11/13/23 1349     Anaerobic Culture No anaerobes isolated    Narrative:      Superior pocket #1    Culture, Anaerobe [[id number]] Collected: 11/09/23 1400    Order Status: Completed Specimen: Incision site from Chest, Left Updated: 11/13/23 1349     Anaerobic Culture No anaerobes isolated    Narrative:      Deep pocket #2    Culture, Anaerobe [[id number]] Collected: 11/09/23 1400    Order Status: Completed Specimen: Incision site from Chest, Left Updated: 11/13/23 1348     Anaerobic Culture No anaerobes isolated    Narrative:      Deep pocket #1    Culture, Anaerobe [[id number]] Collected: 11/09/23 1400    Order Status: Completed Specimen: Incision site from Chest, Left Updated: 11/13/23 1347     Anaerobic Culture No anaerobes isolated    Narrative:       Superficial pocket #2    Culture, Anaerobe [[id number]] Collected: 11/09/23 1400    Order Status: Completed Specimen: Incision site from Chest, Left Updated: 11/13/23 1346     Anaerobic Culture No anaerobes isolated    Narrative:      Superficial pocket #1    Aerobic culture [[id number]]  (Abnormal)  (Susceptibility) Collected: 11/09/23 1400    Order Status: Completed Specimen: Incision site from Chest, Left Updated: 11/13/23 0950     Aerobic Bacterial Culture STAPHYLOCOCCUS EPIDERMIDIS  Rare      Narrative:      Deep pocket #2    Aerobic culture [[id number]]  (Abnormal)  (Susceptibility) Collected: 11/09/23 1400    Order Status: Completed Specimen: Incision site from Chest, Left Updated: 11/13/23 0950     Aerobic Bacterial Culture STAPHYLOCOCCUS AUREUS  Rare      Narrative:      Superficial pocket #1    Aerobic culture [[id number]]  (Abnormal)  (Susceptibility) Collected: 11/09/23 1415    Order Status: Completed Specimen: Incision site from Chest, Left Updated: 11/13/23 0949     Aerobic Bacterial Culture STAPHYLOCOCCUS AUREUS  Rare  Skin ramesh also present      Narrative:      Superior pocket #1    Aerobic culture [[id number]]  (Abnormal)  (Susceptibility) Collected: 11/09/23 1400    Order Status: Completed Specimen: Incision site from Chest, Left Updated: 11/13/23 0945     Aerobic Bacterial Culture STAPHYLOCOCCUS AUREUS  Rare        STAPHYLOCOCCUS EPIDERMIDIS  Rare      Narrative:      Deep pocket #1    Aerobic culture [[id number]]  (Abnormal)  (Susceptibility) Collected: 11/09/23 1400    Order Status: Completed Specimen: Incision site from Chest, Left Updated: 11/13/23 0944     Aerobic Bacterial Culture STAPHYLOCOCCUS AUREUS  Rare  Skin ramesh also present      Narrative:      Superficial pocket #2    Aerobic culture [[id number]] Collected: 11/09/23 1430    Order Status: Completed Specimen: Incision site from Chest, Left Updated: 11/13/23 0840     Aerobic Bacterial Culture No growth    Narrative:      Lead tip     Blood culture [[id number]] Collected: 11/10/23 0400    Order Status: Completed Specimen: Blood from Peripheral, Wrist, Left Updated: 11/13/23 0612     Blood Culture, Routine No Growth to date      No Growth to date      No Growth to date      No Growth to date    Blood culture [[id number]] Collected: 11/10/23 0403    Order Status: Completed Specimen: Blood from Peripheral, Wrist, Right Updated: 11/13/23 0612     Blood Culture, Routine No Growth to date      No Growth to date      No Growth to date      No Growth to date    Blood culture x two cultures. Draw prior to antibiotics. [[id number]] Collected: 11/08/23 1407    Order Status: Completed Specimen: Blood from Peripheral, Lower Arm, Left Updated: 11/12/23 1612     Blood Culture, Routine No Growth to date      No Growth to date      No Growth to date      No Growth to date      No Growth to date    Narrative:      Aerobic and anaerobic    Blood culture x two cultures. Draw prior to antibiotics. [[id number]] Collected: 11/08/23 1408    Order Status: Completed Specimen: Blood from Peripheral, Antecubital, Left Updated: 11/12/23 1612     Blood Culture, Routine No Growth to date      No Growth to date      No Growth to date      No Growth to date      No Growth to date    Narrative:      Aerobic and anaerobic            I have reviewed all pertinent labs within the past 24 hours.    Estimated Creatinine Clearance: 99.8 mL/min (based on SCr of 1.2 mg/dL).    Diagnostic Results:  I have reviewed all pertinent imaging results/findings within the past 24 hours.

## 2023-11-13 NOTE — PROGRESS NOTES
11/13/23 1451        VAD 06/29/22 1115 Left ventricular assist device HeartMate 3   Placement Date/Time: 06/29/22 1115   Present Prior to Hospital Arrival?: No  Inserted by: MD  VAD Type: Left ventricular assist device  VAD Brand: HeartMate 3   Site Location Abdomen right   Site Assessment Clean;Intact;Dry   Driveline Exit Site 1   Driveline Assessment Free of Kinks;Modular cable Clean and Locked;Intact   Dressing Status Clean;Dry;Intact   Dressing Intervention Sterile dressing change   Performed By RN   Dressing Change Schedule Daily   Dressing Change Due 11/14/23   Driveline [place] in use [place] askew   Condition CDI     LVAD dressing change completed with kit using sterile technique. No redness or tenderness noted.

## 2023-11-13 NOTE — PROGRESS NOTES
[facility] - Cardiology Stepdown  Heart Transplant  Progress Note    Patient Name: Kevan Queen  MRN: [id number]  Admission Date: 11/8/2023  Hospital Length of Stay: 5 days  Attending Physician: Josh Ryan, *  Primary Care Provider: Rosio Armendariz FNP  Principal Problem:ICD (implantable cardioverter-defibrillator) malfunction    Subjective:   Interval History: NAOEN. Wound care consulted today and packed with wet to dry dressing. No active bleeding noted. H/H stable. Will need lifevest prior to discharge. Blood cx NGTD. Wound cx with staph areus. Remains on Vancomycin.     Continuous Infusions:   sodium chloride 0.9% Stopped (11/11/23 1021)    milrinone 20mg/100ml D5W (200mcg/ml) 0.25 mcg/kg/min (11/13/23 1427)     Scheduled Meds:   aspirin  81 mg Oral Daily    atorvastatin  40 mg Oral Daily    furosemide  80 mg Oral Daily    insulin aspart U-100  8 Units Subcutaneous TIDWM    insulin detemir U-100  18 Units Subcutaneous BID    levETIRAcetam  1,000 mg Oral BID    magnesium oxide  400 mg Oral BID    methocarbamoL  500 mg Oral QID    mirtazapine  15 mg Oral Nightly    pantoprazole  40 mg Oral Daily    sodium chloride 0.9%  10 mL Intravenous Q6H    spironolactone  25 mg Oral Daily    vancomycin (VANCOCIN) IV (PEDS and ADULTS)  1,500 mg Intravenous Q12H     PRN Meds:acetaminophen, dextrose 10%, dextrose 10%, dextrose 10%, dextrose 10%, dextrose, dextrose, glucagon (human recombinant), glucose, influenza, sodium chloride 0.9%, Flushing PICC/Midline Protocol **AND** sodium chloride 0.9% **AND** sodium chloride 0.9%, Pharmacy to dose Vancomycin consult **AND** vancomycin - pharmacy to dose    Review of patient's allergies indicates:   Allergen Reactions    Bumex [bumetanide] Hives    Torsemide Hives     Objective:     Vital Signs (Most Recent):  Temp: 98.2 °F (36.8 °C) (11/13/23 1141)  Pulse: 72 (11/13/23 1141)  Resp: 20 (11/13/23 1141)  BP: (!) 76/0 (11/13/23 1141)  SpO2: 100 % (11/13/23 1141) Vital Signs (24h  Range):  Temp:  [98.1 °F (36.7 °C)-98.6 °F (37 °C)] 98.2 °F (36.8 °C)  Pulse:  [] 72  Resp:  [18-20] 20  SpO2:  [97 %-100 %] 100 %  BP: (76-88)/(0) 76/0     Patient Vitals for the past 72 hrs (Last 3 readings):   Weight   11/12/23 0438 86 kg (189 lb 9.5 oz)   11/11/23 0400 86 kg (189 lb 9.5 oz)       Body mass index is 23.7 kg/m².      Intake/Output Summary (Last 24 hours) at 11/13/2023 1453  Last data filed at 11/13/2023 1313  Gross per 24 hour   Intake 864 ml   Output 2200 ml   Net -1336 ml       CVP:  [15 mmHg] 15 mmHg       Physical Exam  Constitutional:       Appearance: Normal appearance.   HENT:      Head: Normocephalic and atraumatic.   Cardiovascular:      Rate and Rhythm: Normal rate and regular rhythm.      Pulses: Normal pulses.      Heart sounds: Normal heart sounds.      Comments: LVAD hum smooth  Pulmonary:      Effort: Pulmonary effort is normal.      Breath sounds: Normal breath sounds.   Chest:      Comments: AICD pocket site in L chest wall covered w/ dressing  Abdominal:      General: Bowel sounds are normal. There is no distension.      Palpations: Abdomen is soft.      Tenderness: There is no abdominal tenderness.   Musculoskeletal:         General: Normal range of motion.      Cervical back: Normal range of motion and neck supple.      Right lower leg: No edema.      Left lower leg: No edema.   Skin:     General: Skin is warm and dry.      Capillary Refill: Capillary refill takes 2 to 3 seconds.   Neurological:      General: No focal deficit present.      Mental Status: He is alert and oriented to person, place, and time.   Psychiatric:         Mood and Affect: Mood normal.         Behavior: Behavior normal.            Significant Labs:  CBC:  Recent Labs   Lab 11/11/23  0442 11/11/23  1155 11/12/23  0510 11/12/23  1303 11/13/23  0407   WBC 12.75*  --  11.45  --  12.26   RBC 3.16*  --  3.32*  --  3.42*   HGB 7.5*   < > 7.9*  7.8* 8.1* 8.1*   HCT 23.9*  --  25.2*  --  26.0*     --   297  --  338   MCV 76*  --  76*  --  76*   MCH 23.7*  --  23.8*  --  23.7*   MCHC 31.4*  --  31.3*  --  31.2*    < > = values in this interval not displayed.       BNP:  Recent Labs   Lab 11/08/23  1407 11/13/23 0407   * 116*       CMP:  Recent Labs   Lab 11/11/23  0442 11/11/23  1155 11/12/23  0509 11/12/23 2052 11/13/23  0407   *   < > 177* 192* 175*   CALCIUM 8.5*   < > 8.9 8.9 9.1   ALBUMIN 2.7*  --  2.7*  --  2.9*  2.9*   PROT 6.4  --  6.6  --  7.1  7.1      < > 136 135* 136   K 3.3*   < > 3.6 4.1 4.3   CO2 26   < > 26 30* 28      < > 100 97 98   BUN 17   < > 12 14 13   CREATININE 1.3   < > 1.3 1.4 1.2   ALKPHOS 103  --  112  --  131  131   ALT 8*  --  8*  --  15  15   AST 17  --  19  --  25  25   BILITOT 2.2*  --  0.9  --  0.8  0.8    < > = values in this interval not displayed.        Coagulation:   Recent Labs   Lab 11/11/23 0442 11/12/23  0510 11/13/23  0407   INR 1.5* 1.2 1.1   APTT 25.2 23.9 22.7       LDH:  Recent Labs   Lab 11/11/23 0442 11/12/23  0509 11/13/23  0407   * 243 261*       Microbiology:  Microbiology Results (last 7 days)       Procedure Component Value Units Date/Time    Culture, Anaerobe [[id number]] Collected: 11/09/23 1430    Order Status: Completed Specimen: Incision site from Chest, Left Updated: 11/13/23 1350     Anaerobic Culture No anaerobes isolated    Narrative:      Lead tip    Culture, Anaerobe [[id number]] Collected: 11/09/23 1415    Order Status: Completed Specimen: Incision site from Chest, Left Updated: 11/13/23 1349     Anaerobic Culture No anaerobes isolated    Narrative:      Superior pocket #1    Culture, Anaerobe [[id number]] Collected: 11/09/23 1400    Order Status: Completed Specimen: Incision site from Chest, Left Updated: 11/13/23 1349     Anaerobic Culture No anaerobes isolated    Narrative:      Deep pocket #2    Culture, Anaerobe [[id number]] Collected: 11/09/23 1400    Order Status: Completed Specimen: Incision site  from Chest, Left Updated: 11/13/23 1348     Anaerobic Culture No anaerobes isolated    Narrative:      Deep pocket #1    Culture, Anaerobe [[id number]] Collected: 11/09/23 1400    Order Status: Completed Specimen: Incision site from Chest, Left Updated: 11/13/23 1347     Anaerobic Culture No anaerobes isolated    Narrative:      Superficial pocket #2    Culture, Anaerobe [[id number]] Collected: 11/09/23 1400    Order Status: Completed Specimen: Incision site from Chest, Left Updated: 11/13/23 1346     Anaerobic Culture No anaerobes isolated    Narrative:      Superficial pocket #1    Aerobic culture [[id number]]  (Abnormal)  (Susceptibility) Collected: 11/09/23 1400    Order Status: Completed Specimen: Incision site from Chest, Left Updated: 11/13/23 0950     Aerobic Bacterial Culture STAPHYLOCOCCUS EPIDERMIDIS  Rare      Narrative:      Deep pocket #2    Aerobic culture [[id number]]  (Abnormal)  (Susceptibility) Collected: 11/09/23 1400    Order Status: Completed Specimen: Incision site from Chest, Left Updated: 11/13/23 0950     Aerobic Bacterial Culture STAPHYLOCOCCUS AUREUS  Rare      Narrative:      Superficial pocket #1    Aerobic culture [[id number]]  (Abnormal)  (Susceptibility) Collected: 11/09/23 1415    Order Status: Completed Specimen: Incision site from Chest, Left Updated: 11/13/23 0949     Aerobic Bacterial Culture STAPHYLOCOCCUS AUREUS  Rare  Skin ramesh also present      Narrative:      Superior pocket #1    Aerobic culture [[id number]]  (Abnormal)  (Susceptibility) Collected: 11/09/23 1400    Order Status: Completed Specimen: Incision site from Chest, Left Updated: 11/13/23 0945     Aerobic Bacterial Culture STAPHYLOCOCCUS AUREUS  Rare        STAPHYLOCOCCUS EPIDERMIDIS  Rare      Narrative:      Deep pocket #1    Aerobic culture [[id number]]  (Abnormal)  (Susceptibility) Collected: 11/09/23 1400    Order Status: Completed Specimen: Incision site from Chest, Left Updated: 11/13/23 0944     Aerobic  Bacterial Culture STAPHYLOCOCCUS AUREUS  Rare  Skin ramesh also present      Narrative:      Superficial pocket #2    Aerobic culture [[id number]] Collected: 11/09/23 1430    Order Status: Completed Specimen: Incision site from Chest, Left Updated: 11/13/23 0840     Aerobic Bacterial Culture No growth    Narrative:      Lead tip    Blood culture [[id number]] Collected: 11/10/23 0400    Order Status: Completed Specimen: Blood from Peripheral, Wrist, Left Updated: 11/13/23 0612     Blood Culture, Routine No Growth to date      No Growth to date      No Growth to date      No Growth to date    Blood culture [[id number]] Collected: 11/10/23 0403    Order Status: Completed Specimen: Blood from Peripheral, Wrist, Right Updated: 11/13/23 0612     Blood Culture, Routine No Growth to date      No Growth to date      No Growth to date      No Growth to date    Blood culture x two cultures. Draw prior to antibiotics. [[id number]] Collected: 11/08/23 1407    Order Status: Completed Specimen: Blood from Peripheral, Lower Arm, Left Updated: 11/12/23 1612     Blood Culture, Routine No Growth to date      No Growth to date      No Growth to date      No Growth to date      No Growth to date    Narrative:      Aerobic and anaerobic    Blood culture x two cultures. Draw prior to antibiotics. [[id number]] Collected: 11/08/23 1408    Order Status: Completed Specimen: Blood from Peripheral, Antecubital, Left Updated: 11/12/23 1612     Blood Culture, Routine No Growth to date      No Growth to date      No Growth to date      No Growth to date      No Growth to date    Narrative:      Aerobic and anaerobic            I have reviewed all pertinent labs within the past 24 hours.    Estimated Creatinine Clearance: 99.8 mL/min (based on SCr of 1.2 mg/dL).    Diagnostic Results:  I have reviewed all pertinent imaging results/findings within the past 24 hours.  Assessment and Plan:     Kevan Queen is a 38 y.o. male on LVAD presenting with c/o  defibrillator coming out of his chest. He stated that this started a few months ago. He also c/o that his picc line looks longer that the previous ones he has had. The picc line is functioning and there is no irritation around it. He uses it daily for his milrinone infusion. He states that his defibrillator has been coming out of his chest for around a week now. He denied chest trauma, fever, nausea, vomiting or diarrhea. Breathing is at baseline and comfortable at rest. Denies orthopnea or PND or LVAD alarms.        * ICD (implantable cardioverter-defibrillator) malfunction  -Patient presented with SEA AICD falling out of L chest wall. Apparently had been this way for 1 week  -Unclear reason for dehiscence of ICD site  -BCx's NGTD.  Continue Vanc  -s/p device extraction complicated by hematoma so wound vac removed. Hemostasis achieved with general surgery help.  -Wound care following.   -Orders for BID wet to dry dressing   -Lifevest ordered     Seizure-like activity  Continue keppra    LVAD (left ventricular assist device) present  -HeartMate 3 Implanted on 6/29/2022 as DT with RV failure on milrinone at 0.25 mcg/kg/min.  -Restart Coumadin, Goal INR 2.0-3.0. subtherapeutic today   -LDH is stable.  Will continue to monitor daily  -Speed set at 5100, LSL 4700 rpm  Review of device function is stable/unstable stable  -Palliative consulted today         11/13/2023    11:43 AM 11/13/2023     7:10 AM 11/13/2023     3:54 AM 11/12/2023    11:19 PM 11/12/2023     7:55 PM 11/12/2023     4:08 PM 11/12/2023    12:11 PM   TXP LVAD INTERROGATIONS   Type HeartMate3 HeartMate3 HeartMate3 HeartMate3 HeartMate3 HeartMate3 HeartMate3   Flow 4 4.2 4.2 4.4 4 4.1 4.2   Speed 5100 5100 5100 5100 5100 5100 5100   PI 3.2 4.9 5.1 4 5.8 5.6 5.1   Power (Serna) 3.6 3.5 3.6 3.6 3.5 3.7 3.6   LSL 4700 4700 4700  4700     Pulsatility Intermittent pulse Intermittent pulse Intermittent pulse  Intermittent pulse         Type 2  diabetes mellitus with hyperglycemia  Sliding scale insulin        Marco Rothman, LUCÍA  Heart Transplant  [facility] - Cardiology Stepdown

## 2023-11-13 NOTE — PROGRESS NOTES
Pharmacokinetic Assessment Follow Up: IV Vancomycin    Vancomycin serum concentration assessment(s):    ~10 hour level = 16.4 (goal 15-20 for MRSA lead infection). RF stable. Continue current dose and draw trough prior to the fifth dose on 11/15 @ ~5:00.     Drug levels (last 3 results):  Recent Labs   Lab Result Units 11/10/23  1533 11/11/23  1621 11/13/23  0407   Vancomycin-Trough ug/mL 8.3* 8.8* 16.4       CBC (last 72 hours):  Recent Labs   Lab Result Units 11/10/23  1219 11/10/23  1753 11/11/23  0442 11/11/23  1155 11/11/23  1751 11/11/23  2325 11/12/23  0510 11/12/23  1303 11/13/23  0407   WBC K/uL  --   --  12.75*  --   --   --  11.45  --  12.26   Hemoglobin g/dL 7.1* 6.6* 7.5* 7.5* 7.8* 7.7* 7.9*  7.8* 8.1* 8.1*   Hemoglobin A1C %  --   --   --   --   --   --   --   --  8.9*   Hematocrit %  --   --  23.9*  --   --   --  25.2*  --  26.0*   Platelets K/uL  --   --  279  --   --   --  297  --  338   Gran % %  --   --  68.8  --   --   --  69.8  --  67.3   Lymph % %  --   --  21.5  --   --   --  21.7  --  22.9   Mono % %  --   --  8.5  --   --   --  7.0  --  8.2   Eosinophil % %  --   --  0.5  --   --   --  0.8  --  0.9   Basophil % %  --   --  0.5  --   --   --  0.4  --  0.4   Differential Method   --   --  Automated  --   --   --  Automated  --  Automated       Metabolic Panel (last 72 hours):  Recent Labs   Lab Result Units 11/10/23  1026 11/11/23  0442 11/11/23  1155 11/11/23  1621 11/12/23  0509 11/12/23 2052 11/13/23  0407   Sodium mmol/L  --  136 135* 135* 136 135* 136   Potassium mmol/L  --  3.3* 3.5 3.6 3.6 4.1 4.3   Chloride mmol/L  --  100 99 99 100 97 98   CO2 mmol/L  --  26 29 27 26 30* 28   Glucose mg/dL  --  260* 284* 201* 177* 192* 175*   BUN mg/dL  --  17 16 14 12 14 13   Creatinine mg/dL  --  1.3 1.2 1.2 1.3 1.4 1.2   Albumin g/dL  --  2.7*  --   --  2.7*  --  2.9*  2.9*   Total Bilirubin mg/dL  --  2.2*  --   --  0.9  --  0.8  0.8   Alkaline Phosphatase U/L  --  103  --   --  112  --  131   131   AST U/L  --  17  --   --  19  --  25  25   ALT U/L  --  8*  --   --  8*  --  15  15   Magnesium mg/dL 2.1 1.8 1.9 1.7 1.6 1.8 1.9   Phosphorus mg/dL  --   --   --   --  2.6*  --  2.6*       Vancomycin Administrations:  vancomycin given in the last 96 hours                     vancomycin 1,500 mg in dextrose 5 % (D5W) 250 mL IVPB (Vial-Mate) (mg) 1,500 mg New Bag 11/13/23 0516     1,500 mg New Bag 11/12/23 1754     1,500 mg New Bag  0521     1,500 mg New Bag 11/11/23 1743    vancomycin (VANCOCIN) 1,000 mg in dextrose 5 % (D5W) 250 mL IVPB (Vial-Mate) (mg) 1,000 mg New Bag 11/11/23 0500     1,000 mg New Bag 11/10/23 1616     1,000 mg New Bag  0448    vancomycin injection (g) 1 g Given 11/09/23 1308                    Microbiologic Results:  Microbiology Results (last 7 days)       Procedure Component Value Units Date/Time    Aerobic culture [[id number]]  (Abnormal)  (Susceptibility) Collected: 11/09/23 1400    Order Status: Completed Specimen: Incision site from Chest, Left Updated: 11/13/23 0950     Aerobic Bacterial Culture STAPHYLOCOCCUS EPIDERMIDIS  Rare      Narrative:      Deep pocket #2    Aerobic culture [[id number]]  (Abnormal)  (Susceptibility) Collected: 11/09/23 1400    Order Status: Completed Specimen: Incision site from Chest, Left Updated: 11/13/23 0950     Aerobic Bacterial Culture STAPHYLOCOCCUS AUREUS  Rare      Narrative:      Superficial pocket #1    Aerobic culture [[id number]]  (Abnormal)  (Susceptibility) Collected: 11/09/23 1415    Order Status: Completed Specimen: Incision site from Chest, Left Updated: 11/13/23 0949     Aerobic Bacterial Culture STAPHYLOCOCCUS AUREUS  Rare  Skin ramesh also present      Narrative:      Superior pocket #1    Aerobic culture [[id number]]  (Abnormal)  (Susceptibility) Collected: 11/09/23 1400    Order Status: Completed Specimen: Incision site from Chest, Left Updated: 11/13/23 0945     Aerobic Bacterial Culture STAPHYLOCOCCUS AUREUS  Rare         STAPHYLOCOCCUS EPIDERMIDIS  Rare      Narrative:      Deep pocket #1    Aerobic culture [[id number]]  (Abnormal)  (Susceptibility) Collected: 11/09/23 1400    Order Status: Completed Specimen: Incision site from Chest, Left Updated: 11/13/23 0944     Aerobic Bacterial Culture STAPHYLOCOCCUS AUREUS  Rare  Skin ramesh also present      Narrative:      Superficial pocket #2    Aerobic culture [[id number]] Collected: 11/09/23 1430    Order Status: Completed Specimen: Incision site from Chest, Left Updated: 11/13/23 0840     Aerobic Bacterial Culture No growth    Narrative:      Lead tip    Blood culture [[id number]] Collected: 11/10/23 0400    Order Status: Completed Specimen: Blood from Peripheral, Wrist, Left Updated: 11/13/23 0612     Blood Culture, Routine No Growth to date      No Growth to date      No Growth to date      No Growth to date    Blood culture [[id number]] Collected: 11/10/23 0403    Order Status: Completed Specimen: Blood from Peripheral, Wrist, Right Updated: 11/13/23 0612     Blood Culture, Routine No Growth to date      No Growth to date      No Growth to date      No Growth to date    Blood culture x two cultures. Draw prior to antibiotics. [[id number]] Collected: 11/08/23 1407    Order Status: Completed Specimen: Blood from Peripheral, Lower Arm, Left Updated: 11/12/23 1612     Blood Culture, Routine No Growth to date      No Growth to date      No Growth to date      No Growth to date      No Growth to date    Narrative:      Aerobic and anaerobic    Blood culture x two cultures. Draw prior to antibiotics. [[id number]] Collected: 11/08/23 1408    Order Status: Completed Specimen: Blood from Peripheral, Antecubital, Left Updated: 11/12/23 1612     Blood Culture, Routine No Growth to date      No Growth to date      No Growth to date      No Growth to date      No Growth to date    Narrative:      Aerobic and anaerobic    Culture, Anaerobe [[id number]] Collected: 11/09/23 1400    Order Status:  Completed Specimen: Incision site from Chest, Left Updated: 11/10/23 0608     Anaerobic Culture Culture in progress    Narrative:      Superficial pocket #1    Culture, Anaerobe [[id number]] Collected: 11/09/23 1400    Order Status: Completed Specimen: Incision site from Chest, Left Updated: 11/10/23 0608     Anaerobic Culture Culture in progress    Narrative:      Deep pocket #1    Culture, Anaerobe [[id number]] Collected: 11/09/23 1400    Order Status: Completed Specimen: Incision site from Chest, Left Updated: 11/10/23 0608     Anaerobic Culture Culture in progress    Narrative:      Deep pocket #2    Culture, Anaerobe [[id number]] Collected: 11/09/23 1415    Order Status: Completed Specimen: Incision site from Chest, Left Updated: 11/10/23 0608     Anaerobic Culture Culture in progress    Narrative:      Superior pocket #1    Culture, Anaerobe [[id number]] Collected: 11/09/23 1400    Order Status: Completed Specimen: Incision site from Chest, Left Updated: 11/10/23 0608     Anaerobic Culture Culture in progress    Narrative:      Superficial pocket #2    Culture, Anaerobe [[id number]] Collected: 11/09/23 1430    Order Status: Completed Specimen: Incision site from Chest, Left Updated: 11/10/23 0608     Anaerobic Culture Culture in progress    Narrative:      Lead tip

## 2023-11-13 NOTE — PLAN OF CARE
Problem: Adult Inpatient Plan of Care  Goal: Patient-Specific Goal (Individualized)  Outcome: Ongoing, Progressing  Flowsheets (Taken 11/12/2023 5601)  Anxieties, Fears or Concerns: L CHEST pressure dressing.  Individualized Care Needs:   Maintain ABX protocol. Mantain Insulin gtt infusing as per MAR and nomogram. Maintain Milrinone gtt infusing as per MAR. Maintain daily CVP with am labs. Lvad dressing to be performed daily with kit;  due 11/13. MOnitor LVAD #.Monitor electrolytes.

## 2023-11-13 NOTE — ASSESSMENT & PLAN NOTE
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  -Restart Coumadin, Goal INR 2.0-3.0. subtherapeutic today   -LDH is stable.  Will continue to monitor daily  -Speed set at 5100, LSL 4700 rpm  Review of device function is stable/unstable stable        11/13/2023    11:43 AM 11/13/2023     7:10 AM 11/13/2023     3:54 AM 11/12/2023    11:19 PM 11/12/2023     7:55 PM 11/12/2023     4:08 PM 11/12/2023    12:11 PM   TXP LVAD INTERROGATIONS   Type HeartMate3 HeartMate3 HeartMate3 HeartMate3 HeartMate3 HeartMate3 HeartMate3   Flow 4 4.2 4.2 4.4 4 4.1 4.2   Speed 5100 5100 5100 5100 5100 5100 5100   PI 3.2 4.9 5.1 4 5.8 5.6 5.1   Power (Serna) 3.6 3.5 3.6 3.6 3.5 3.7 3.6   LSL 4700 4700 4700  4700     Pulsatility Intermittent pulse Intermittent pulse Intermittent pulse  Intermittent pulse

## 2023-11-13 NOTE — ASSESSMENT & PLAN NOTE
38M with PMH of NICM EF 10-15%, ICD placement in Jan 2022, HM3 LVAD placement on 6/29/23 with DLESI 2/2 MSSA in Sept 2023, previous MV repair, DM2, and epilepsy admitted for erosion of ICD s/p complete device removal - OR cx (pocket with MSSA and staph epi thus far, lead cx so far no growth to date. Blood cultures on admit and repeat no growth to date. Was on chronic cefadroxil at home for previous LVAD MSSA infection.     Recommendations:  Continue vancomycin, pharmacy to dose  F/u staph epi sensitivities

## 2023-11-13 NOTE — SUBJECTIVE & OBJECTIVE
Interval History: Patient seen this morning, no acute complaints. Still has pressure dressing on over left upper chest wall with prominent hump still present. Denies pain, slightly uncomfortable.     Review of Systems   Constitutional: Negative for chills and fever.   Cardiovascular:  Negative for chest pain, orthopnea and palpitations.   Respiratory:  Negative for cough and shortness of breath.    Gastrointestinal:  Negative for abdominal pain.   Neurological:  Negative for dizziness, headaches and light-headedness.   Psychiatric/Behavioral:  Negative for altered mental status.      Objective:     Vital Signs (Most Recent):  Temp: 98.4 °F (36.9 °C) (11/13/23 0709)  Pulse: 106 (11/13/23 0709)  Resp: 18 (11/13/23 0709)  BP: (!) 80/0 (11/13/23 0709)  SpO2: 100 % (11/13/23 0709) Vital Signs (24h Range):  Temp:  [98.1 °F (36.7 °C)-98.9 °F (37.2 °C)] 98.4 °F (36.9 °C)  Pulse:  [106-124] 106  Resp:  [18-20] 18  SpO2:  [97 %-100 %] 100 %  BP: (80-88)/(0) 80/0     Weight: 86 kg (189 lb 9.5 oz)  Body mass index is 23.7 kg/m².     SpO2: 100 %        Physical Exam  Vitals reviewed.   Constitutional:       General: He is not in acute distress.     Appearance: Normal appearance. He is not toxic-appearing.   HENT:      Head: Normocephalic and atraumatic.      Mouth/Throat:      Mouth: Mucous membranes are moist.   Cardiovascular:      Rate and Rhythm: Normal rate and regular rhythm.      Heart sounds: No murmur heard.     No friction rub. No gallop.      Comments: LUCW prominence from hematoma in pocket site post extraction  VAD hum+  Pulmonary:      Effort: Pulmonary effort is normal. No respiratory distress.      Breath sounds: Normal breath sounds.   Abdominal:      Palpations: Abdomen is soft.      Tenderness: There is no abdominal tenderness.   Musculoskeletal:      Right lower leg: No edema.      Left lower leg: No edema.   Skin:     General: Skin is warm.   Neurological:      Mental Status: He is alert. Mental status is at  baseline.        Significant Labs: EP:   Recent Labs   Lab 11/11/23  2325 11/12/23  0509 11/12/23  0510 11/12/23  1303 11/12/23  2052 11/13/23  0407   NA  --  136  --   --  135* 136   K  --  3.6  --   --  4.1 4.3   CL  --  100  --   --  97 98   CO2  --  26  --   --  30* 28   GLU  --  177*  --   --  192* 175*   BUN  --  12  --   --  14 13   CREATININE  --  1.3  --   --  1.4 1.2   CALCIUM  --  8.9  --   --  8.9 9.1   PROT  --  6.6  --   --   --  7.1  7.1   ALBUMIN  --  2.7*  --   --   --  2.9*  2.9*   BILITOT  --  0.9  --   --   --  0.8  0.8   ALKPHOS  --  112  --   --   --  131  131   AST  --  19  --   --   --  25  25   ALT  --  8*  --   --   --  15  15   ANIONGAP  --  10  --   --  8 10   WBC  --   --  11.45  --   --  12.26   HGB  --   --  7.9*  7.8* 8.1*  --  8.1*   HCT   < >  --  25.2*  --   --  26.0*   PLT  --   --  297  --   --  338   INR  --   --  1.2  --   --  1.1    < > = values in this interval not displayed.       Significant Imaging: Echocardiogram: Transthoracic echo (TTE) complete ([facility] Only):   Results for orders placed or performed during the hospital encounter of 08/31/23   Echo   Result Value Ref Range    Ascending aorta 2.99 cm    STJ 2.54 cm    IVS 0.75 0.6 - 1.1 cm    LA size 2.80 cm    Left Atrium Major Axis 6.27 cm    Left Atrium Minor Axis 5.13 cm    LVIDd 7.11 (A) 3.5 - 6.0 cm    LVIDs 7.01 (A) 2.1 - 4.0 cm    LVOT diameter 2.28 cm    Posterior Wall 0.57 (A) 0.6 - 1.1 cm    MV Peak A Carloz 0.61 m/s    E wave deceleration time 113.67 msec    MV Peak E Carloz 0.64 m/s    RA Major Axis 5.87 cm    RA Width 4.79 cm    RVDD 6.66 cm    Sinus 3.04 cm    TAPSE 1.16 cm    TR Max Carloz 2.72 m/s    TDI LATERAL 0.05 m/s    TDI SEPTAL 0.04 m/s    LA WIDTH 4.09 cm    MV stenosis pressure 1/2 time 32.97 ms    LV Diastolic Volume 264.44 mL    LV Systolic Volume 256.33 mL    RV S' 7.57 cm/s    MV mean gradient 1 mmHg    MV peak gradient 3 mmHg    MV VTI 17.13 cm    LV LATERAL E/E' RATIO 12.80 m/s    LV SEPTAL  E/E' RATIO 16.00 m/s    FS 1 %    LA volume 54.93 cm3    LV mass 199.99 g    ZLVIDD -0.40     ZLVIDS 3.51     Left Ventricle Relative Wall Thickness 0.16 cm    MV valve area p 1/2 method 6.67 cm2    E/A ratio 1.05     Mean e' 0.05 m/s    LVOT area 4.1 cm2    E/E' ratio 14.22 m/s    LV Systolic Volume Index 117.0 mL/m2    LV Diastolic Volume Index 120.75 mL/m2    LA Volume Index 25.1 mL/m2    LV Mass Index 91 g/m2    Triscuspid Valve Regurgitation Peak Gradient 30 mmHg    BSA 2.18 m2    TV resting pulmonary artery pressure 38 mmHg    RV TB RVSP 11 mmHg    Est. RA pres 8 mmHg    Narrative      LVAD: The aortic valve does not open. The ventricular septum is at   midline.HM3 5100    Left Ventricle: The left ventricle is severely dilated. Ventricular   mass is normal. Normal wall thickness. Normal wall motion. There is   severely reduced systolic function with a visually estimated ejection   fraction of 10 -15%. There is normal diastolic function.    Right Ventricle: Normal right ventricular cavity size. Wall thickness   is normal. Right ventricle wall motion  is normal. Systolic function is   normal.    Mitral Valve: There is mild regurgitation.    Tricuspid Valve: There is moderate to severe regurgitation.    Pulmonary Artery: The estimated pulmonary artery systolic pressure is   38 mmHg.    IVC/SVC: Intermediate venous pressure at 8 mmHg.

## 2023-11-13 NOTE — PROGRESS NOTES
"[facility]  Endocrinology  Progress Note    Admit Date: 2023     Reason for Consult: Management of T2DM, Hyperglycemia      Surgical Procedure and Date: LVAD 2022     Diabetes diagnosis year:      Home Diabetes Medications:   -Levemir 8 units BID.   -Novolog 6 units TIDWM in addition to the following correction scale:     150 - 200 + 1 unit     201 - 250 + 2 units     251 - 300 + 3 units     301 - 350 + 4 units      > 350   + 5 units     How often checking glucose at home?  > 4 times per day  BG readings on regimen: 180's-200's  Hypoglycemia on the regimen?  No  Missed doses on regimen?  occasionally skips mealsn and will skip Novolog with breakfast      Diabetes Complications include:     Hyperglycemia     Complicating diabetes co morbidities:   History of MI, CHF, and CKD        HPI: Mr. Queen is a 39yo male with HF (EF 10-15%), s/p LVAD placement with HeartMate III, ICD placement 2022, DM2, epilepsy who presented as his ICD began to erode through his skin and was exposed. BG excursions noted in the 400's yesterday evening. Endocrine consulted to manage hyperglycemia and type 2 diabetes.     Lab Results   Component Value Date    HGBA1C 6.9 (H) 2023         Interval HPI:   No acute events overnight. Patient in room 309/309 A. Blood glucose variable. BG at and above goal on current insulin regimen (Transition Insulin Drip). Steroid use- None.   4 Days Post-Op  Renal function- Normal   Vasopressors-  None     Diet diabetic Cardiac (Low Na/Chol); 2000 Calorie; Fluid - 1500mL; Standard Tray     Eatin%  Nausea: No  Hypoglycemia and intervention: No  Fever: No  TPN and/or TF: No    BP (!) 76/0 (BP Location: Left arm, Patient Position: Lying)   Pulse 72   Temp 98.2 °F (36.8 °C)   Resp 20   Ht 6' 3" (1.905 m)   Wt 86 kg (189 lb 9.5 oz)   SpO2 100%   BMI 23.70 kg/m²     Labs Reviewed and Include    Recent Labs   Lab 23  0407   *   CALCIUM 9.1   ALBUMIN " "2.9*  2.9*   PROT 7.1  7.1      K 4.3   CO2 28   CL 98   BUN 13   CREATININE 1.2   ALKPHOS 131  131   ALT 15  15   AST 25  25   BILITOT 0.8  0.8     Lab Results   Component Value Date    WBC 12.26 11/13/2023    HGB 8.1 (L) 11/13/2023    HCT 26.0 (L) 11/13/2023    MCV 76 (L) 11/13/2023     11/13/2023     No results for input(s): "TSH", "FREET4" in the last 168 hours.  Lab Results   Component Value Date    HGBA1C 8.9 (H) 11/13/2023       Nutritional status:   Body mass index is 23.7 kg/m².  Lab Results   Component Value Date    ALBUMIN 2.9 (L) 11/13/2023    ALBUMIN 2.9 (L) 11/13/2023    ALBUMIN 2.7 (L) 11/12/2023     Lab Results   Component Value Date    PREALBUMIN 17 (L) 11/13/2023    PREALBUMIN 15 (L) 08/07/2023    PREALBUMIN 21 08/04/2023       Estimated Creatinine Clearance: 99.8 mL/min (based on SCr of 1.2 mg/dL).    Accu-Checks  Recent Labs     11/12/23  0356 11/12/23  0746 11/12/23  1100 11/12/23  1613 11/12/23  1935 11/12/23  2104 11/13/23  0004 11/13/23  0402 11/13/23  0718 11/13/23  1141   POCTGLUCOSE 100 178* 287* 173* 240* 197* 194* 177* 272* 125*       Current Medications and/or Treatments Impacting Glycemic Control  Immunotherapy:    Immunosuppressants       None          Steroids:   Hormones (From admission, onward)      None          Pressors:    Autonomic Drugs (From admission, onward)      None          Hyperglycemia/Diabetes Medications:   Antihyperglycemics (From admission, onward)      Start     Stop Route Frequency Ordered    11/13/23 1100  insulin detemir U-100 (Levemir) pen 18 Units         -- SubQ 2 times daily 11/13/23 1055    11/13/23 1100  insulin aspart U-100 pen 8 Units         -- SubQ 3 times daily with meals 11/13/23 1055            ASSESSMENT and PLAN    Cardiac/Vascular  * ICD (implantable cardioverter-defibrillator) malfunction  Managed per primary team  Avoid hypoglycemia  Optimize BG control to improve wound healing  Infection may elevate BG readings  On IV " antibiotics          LVAD (left ventricular assist device) present  Managed per primary team  Avoid hypoglycemia        Endocrine  Type 2 diabetes mellitus with hyperglycemia  Endocrinology consulted for BG management.   BG goal 140-180     - will d/c transition drip as discharge may be near   - Start levemir 18 units BID (transitioned based on Tgtt requirements) - will continue to monitor for decreased needs   - Novolog (aspart) insulin 8 Units SQ TIDWM and prn for BG excursions MDC SSI (150/25).  - BG checks AC/HS   - Hypoglycemia protocol in place    ** Please notify Endocrine for any change and/or advance in diet**  ** Please call Endocrine for any BG related issues **    Discharge Planning:   TBD. Please notify endocrinology prior to discharge.            ANAID VencesC  Endocrinology  [facility]

## 2023-11-13 NOTE — PROGRESS NOTES
[facility] - Cardiology Stepdown  Cardiac Electrophysiology  Progress Note    Admission Date: 11/8/2023  Code Status: Full Code   Attending Physician: Josh Ryan, *   Expected Discharge Date: 11/13/2023  Principal Problem:ICD (implantable cardioverter-defibrillator) malfunction    Subjective:     Interval History: Patient seen this morning, no acute complaints. Still has pressure dressing on over left upper chest wall with prominent hump still present. Denies pain, slightly uncomfortable.     Review of Systems   Constitutional: Negative for chills and fever.   Cardiovascular:  Negative for chest pain, orthopnea and palpitations.   Respiratory:  Negative for cough and shortness of breath.    Gastrointestinal:  Negative for abdominal pain.   Neurological:  Negative for dizziness, headaches and light-headedness.   Psychiatric/Behavioral:  Negative for altered mental status.      Objective:     Vital Signs (Most Recent):  Temp: 98.4 °F (36.9 °C) (11/13/23 0709)  Pulse: 106 (11/13/23 0709)  Resp: 18 (11/13/23 0709)  BP: (!) 80/0 (11/13/23 0709)  SpO2: 100 % (11/13/23 0709) Vital Signs (24h Range):  Temp:  [98.1 °F (36.7 °C)-98.9 °F (37.2 °C)] 98.4 °F (36.9 °C)  Pulse:  [106-124] 106  Resp:  [18-20] 18  SpO2:  [97 %-100 %] 100 %  BP: (80-88)/(0) 80/0     Weight: 86 kg (189 lb 9.5 oz)  Body mass index is 23.7 kg/m².     SpO2: 100 %        Physical Exam  Vitals reviewed.   Constitutional:       General: He is not in acute distress.     Appearance: Normal appearance. He is not toxic-appearing.   HENT:      Head: Normocephalic and atraumatic.      Mouth/Throat:      Mouth: Mucous membranes are moist.   Cardiovascular:      Rate and Rhythm: Normal rate.      Comments: LUCW prominence from hematoma in pocket site post extraction  VAD hum+  Pulmonary:      Effort: Pulmonary effort is normal. No respiratory distress.      Breath sounds: Normal breath sounds.   Abdominal:      Palpations: Abdomen is soft.       Tenderness: There is no abdominal tenderness.   Musculoskeletal:      Right lower leg: No edema.      Left lower leg: No edema.   Skin:     General: Skin is warm.   Neurological:      Mental Status: He is alert. Mental status is at baseline.        Significant Labs: EP:   Recent Labs   Lab 11/11/23  2325 11/12/23  0509 11/12/23  0510 11/12/23  1303 11/12/23  2052 11/13/23  0407   NA  --  136  --   --  135* 136   K  --  3.6  --   --  4.1 4.3   CL  --  100  --   --  97 98   CO2  --  26  --   --  30* 28   GLU  --  177*  --   --  192* 175*   BUN  --  12  --   --  14 13   CREATININE  --  1.3  --   --  1.4 1.2   CALCIUM  --  8.9  --   --  8.9 9.1   PROT  --  6.6  --   --   --  7.1  7.1   ALBUMIN  --  2.7*  --   --   --  2.9*  2.9*   BILITOT  --  0.9  --   --   --  0.8  0.8   ALKPHOS  --  112  --   --   --  131  131   AST  --  19  --   --   --  25  25   ALT  --  8*  --   --   --  15  15   ANIONGAP  --  10  --   --  8 10   WBC  --   --  11.45  --   --  12.26   HGB  --   --  7.9*  7.8* 8.1*  --  8.1*   HCT   < >  --  25.2*  --   --  26.0*   PLT  --   --  297  --   --  338   INR  --   --  1.2  --   --  1.1    < > = values in this interval not displayed.       Significant Imaging: Echocardiogram: Transthoracic echo (TTE) complete ([facility] Only):   Results for orders placed or performed during the hospital encounter of 08/31/23   Echo   Result Value Ref Range    Ascending aorta 2.99 cm    STJ 2.54 cm    IVS 0.75 0.6 - 1.1 cm    LA size 2.80 cm    Left Atrium Major Axis 6.27 cm    Left Atrium Minor Axis 5.13 cm    LVIDd 7.11 (A) 3.5 - 6.0 cm    LVIDs 7.01 (A) 2.1 - 4.0 cm    LVOT diameter 2.28 cm    Posterior Wall 0.57 (A) 0.6 - 1.1 cm    MV Peak A Carloz 0.61 m/s    E wave deceleration time 113.67 msec    MV Peak E Carloz 0.64 m/s    RA Major Axis 5.87 cm    RA Width 4.79 cm    RVDD 6.66 cm    Sinus 3.04 cm    TAPSE 1.16 cm    TR Max Carloz 2.72 m/s    TDI LATERAL 0.05 m/s    TDI SEPTAL 0.04 m/s    LA WIDTH 4.09 cm    MV stenosis  pressure 1/2 time 32.97 ms    LV Diastolic Volume 264.44 mL    LV Systolic Volume 256.33 mL    RV S' 7.57 cm/s    MV mean gradient 1 mmHg    MV peak gradient 3 mmHg    MV VTI 17.13 cm    LV LATERAL E/E' RATIO 12.80 m/s    LV SEPTAL E/E' RATIO 16.00 m/s    FS 1 %    LA volume 54.93 cm3    LV mass 199.99 g    ZLVIDD -0.40     ZLVIDS 3.51     Left Ventricle Relative Wall Thickness 0.16 cm    MV valve area p 1/2 method 6.67 cm2    E/A ratio 1.05     Mean e' 0.05 m/s    LVOT area 4.1 cm2    E/E' ratio 14.22 m/s    LV Systolic Volume Index 117.0 mL/m2    LV Diastolic Volume Index 120.75 mL/m2    LA Volume Index 25.1 mL/m2    LV Mass Index 91 g/m2    Triscuspid Valve Regurgitation Peak Gradient 30 mmHg    BSA 2.18 m2    TV resting pulmonary artery pressure 38 mmHg    RV TB RVSP 11 mmHg    Est. RA pres 8 mmHg    Narrative      LVAD: The aortic valve does not open. The ventricular septum is at   midline.HM3 5100    Left Ventricle: The left ventricle is severely dilated. Ventricular   mass is normal. Normal wall thickness. Normal wall motion. There is   severely reduced systolic function with a visually estimated ejection   fraction of 10 -15%. There is normal diastolic function.    Right Ventricle: Normal right ventricular cavity size. Wall thickness   is normal. Right ventricle wall motion  is normal. Systolic function is   normal.    Mitral Valve: There is mild regurgitation.    Tricuspid Valve: There is moderate to severe regurgitation.    Pulmonary Artery: The estimated pulmonary artery systolic pressure is   38 mmHg.    IVC/SVC: Intermediate venous pressure at 8 mmHg.       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 Patient was admitted for concerns of infected scICD. [employer] scICD (10/2020 at [facility]) protruding from L chest  wall on admit. Underwent extraction on 11/9/2023 complicated with hematoma/ bleeding with wound vac removal. INR 3.1 at that time. General surgery did suture ligation of bleeding vessels and application of hemostatic agents. Extracted device is growing MRSA on aerobic cultures 11/11/2023.   - ID consulted, recommendations appreciated   - Was on warfarin for AC to prevent VAD thrombosis. INR 1.1 11/13/2023.  - Device Interrogation. No VT shocks prior  - Wound care consult 11/10/2023 appreciated, need follow up; if Wound Care team recommends achieving hemostasis , recommend following up with Gen Surg again about further care plans  If new ICD for primary prevention is required, please contact EP    LVAD (left ventricular assist device) present  LVAD implanted 06/23/2023  Management per HTS team    Staff: Dr Carolyn Sanderson MD  Cardiac Electrophysiology PGY6  [facility] - Cardiology Stepdown

## 2023-11-13 NOTE — PLAN OF CARE
AAOX4,VSS,O2 sats>97% on RA. Patient ambulating independently, fall precautions in place.LVAD DP and numbers WNL, smooth LVAD hum. Patient has no complaints of pain/SOB. Discussed medications and care.Patient has no questions at this time.Pt visualised and stable.Pt resting well,call light within reach, bed at the lowest position.    Problem: Adult Inpatient Plan of Care  Goal: Plan of Care Review  Outcome: Ongoing, Progressing  Goal: Patient-Specific Goal (Individualized)  Outcome: Ongoing, Progressing  Goal: Absence of Hospital-Acquired Illness or Injury  Outcome: Ongoing, Progressing  Goal: Optimal Comfort and Wellbeing  Outcome: Ongoing, Progressing  Goal: Readiness for Transition of Care  Outcome: Ongoing, Progressing     Problem: Infection  Goal: Absence of Infection Signs and Symptoms  Outcome: Ongoing, Progressing     Problem: Impaired Wound Healing  Goal: Optimal Wound Healing  Outcome: Ongoing, Progressing     Problem: Fall Injury Risk  Goal: Absence of Fall and Fall-Related Injury  Outcome: Ongoing, Progressing     Problem: Skin Injury Risk Increased  Goal: Skin Health and Integrity  Outcome: Ongoing, Progressing     Problem: Coping Ineffective  Goal: Effective Coping  Outcome: Ongoing, Progressing

## 2023-11-13 NOTE — SUBJECTIVE & OBJECTIVE
Interval History:     Patient reports continued pain at the surgical site, but it has been improving. Denies any other symptoms.     Review of Systems   Constitutional:  Negative for chills and fever.   Respiratory:  Negative for cough and shortness of breath.    Cardiovascular:  Positive for chest pain (at ICD surgical site).   Gastrointestinal:  Negative for abdominal pain, constipation, diarrhea, nausea and vomiting.   Genitourinary:  Negative for dysuria and hematuria.   Musculoskeletal:  Negative for arthralgias and myalgias.   Skin:  Positive for wound. Negative for rash.   Neurological:  Negative for weakness and headaches.     Objective:     Vital Signs (Most Recent):  Temp: 98.4 °F (36.9 °C) (11/13/23 0709)  Pulse: 97 (11/13/23 1112)  Resp: 18 (11/13/23 0709)  BP: (!) 80/0 (11/13/23 0709)  SpO2: 100 % (11/13/23 0709) Vital Signs (24h Range):  Temp:  [98.1 °F (36.7 °C)-98.9 °F (37.2 °C)] 98.4 °F (36.9 °C)  Pulse:  [] 97  Resp:  [18-20] 18  SpO2:  [97 %-100 %] 100 %  BP: (80-88)/(0) 80/0     Weight: 86 kg (189 lb 9.5 oz)  Body mass index is 23.7 kg/m².    Estimated Creatinine Clearance: 99.8 mL/min (based on SCr of 1.2 mg/dL).     Physical Exam  Vitals reviewed.   Constitutional:       General: He is not in acute distress.     Appearance: Normal appearance. He is not ill-appearing.   HENT:      Head: Normocephalic and atraumatic.   Eyes:      Extraocular Movements: Extraocular movements intact.      Conjunctiva/sclera: Conjunctivae normal.   Cardiovascular:      Rate and Rhythm: Normal rate and regular rhythm.      Heart sounds: No murmur heard.     Comments: Surgical site with dressing in place that is clean, dry, intact  Pulmonary:      Effort: Pulmonary effort is normal. No respiratory distress.      Breath sounds: Normal breath sounds.   Abdominal:      General: Abdomen is flat. Bowel sounds are normal.      Palpations: Abdomen is soft.      Tenderness: There is no abdominal tenderness.    Musculoskeletal:      Cervical back: Normal range of motion.   Skin:     General: Skin is warm and dry.   Neurological:      General: No focal deficit present.      Mental Status: He is alert and oriented to person, place, and time.   Psychiatric:         Mood and Affect: Mood normal.         Behavior: Behavior normal.         Thought Content: Thought content normal.          Significant Labs: All pertinent labs within the past 24 hours have been reviewed.  Recent Lab Results  (Last 5 results in the past 24 hours)        11/13/23  0718   11/13/23  0407   11/13/23  0402   11/13/23  0004   11/12/23  2104        Albumin   2.9                2.9             ALP   131                131             ALT   15                15             Anion Gap   10             aPTT   22.7  Comment: Refer to local heparin nomogram for intensity/dose specific   therapeutic   range.               AST   25                25             Baso #   0.05             Basophil %   0.4             Bilirubin Direct   0.4             BILIRUBIN TOTAL   0.8  Comment: For infants and newborns, interpretation of results should be based  on gestational age, weight and in agreement with clinical  observations.    Premature Infant recommended reference ranges:  Up to 24 hours.............<8.0 mg/dL  Up to 48 hours............<12.0 mg/dL  3-5 days..................<15.0 mg/dL  6-29 days.................<15.0 mg/dL                  0.8  Comment: For infants and newborns, interpretation of results should be based  on gestational age, weight and in agreement with clinical  observations.    Premature Infant recommended reference ranges:  Up to 24 hours.............<8.0 mg/dL  Up to 48 hours............<12.0 mg/dL  3-5 days..................<15.0 mg/dL  6-29 days.................<15.0 mg/dL               BNP   116  Comment: Values of less than 100 pg/ml are consistent with non-CHF populations.             BUN   13             Calcium   9.1              Chloride   98             CO2   28             Creatinine   1.2             CRP   1.9             Differential Method   Automated             eGFR   >60.0             Eos #   0.1             Eosinophil %   0.9             Estimated Avg Glucose   209             Glucose   175             Gran # (ANC)   8.3             Gran %   67.3             Hematocrit   26.0             Hemoglobin   8.1             Hemoglobin A1C External   8.9  Comment: ADA Screening Guidelines:  5.7-6.4%  Consistent with prediabetes  >or=6.5%  Consistent with diabetes    High levels of fetal hemoglobin interfere with the HbA1C  assay. Heterozygous hemoglobin variants (HbS, HgC, etc)do  not significantly interfere with this assay.   However, presence of multiple variants may affect accuracy.               Immature Grans (Abs)   0.04  Comment: Mild elevation in immature granulocytes is non specific and   can be seen in a variety of conditions including stress response,   acute inflammation, trauma and pregnancy. Correlation with other   laboratory and clinical findings is essential.               Immature Granulocytes   0.3             INR   1.1  Comment: Coumadin Therapy:  2.0 - 3.0 for INR for all indicators except mechanical heart valves  and antiphospholipid syndromes which should use 2.5 - 3.5.               LD   261  Comment: Results are increased in hemolyzed samples.             Lymph #   2.8             Lymph %   22.9             Magnesium    1.9             MCH   23.7             MCHC   31.2             MCV   76             Mono #   1.0             Mono %   8.2             MPV   10.8             nRBC   0             Phosphorus Level   2.6             Platelet Count   338             POCT Glucose 272     177   194   197       Potassium   4.3             Prealbumin   17             PROTEIN TOTAL   7.1                7.1             Protime   11.6             RBC   3.42             RDW   20.5             Sodium   136              Vancomycin-Trough   16.4             WBC   12.26                                    Significant Imaging: I have reviewed all pertinent imaging results/findings within the past 24 hours.

## 2023-11-13 NOTE — PROGRESS NOTES
11/13/2023  John Alaniz    Current provider:  Josh Ryan, *    Device interrogation:      11/13/2023    11:43 AM 11/13/2023     7:10 AM 11/13/2023     3:54 AM 11/12/2023    11:19 PM 11/12/2023     7:55 PM 11/12/2023     4:08 PM 11/12/2023    12:11 PM   TXP LVAD INTERROGATIONS   Type HeartMate3 HeartMate3 HeartMate3 HeartMate3 HeartMate3 HeartMate3 HeartMate3   Flow 4 4.2 4.2 4.4 4 4.1 4.2   Speed 5100 5100 5100 5100 5100 5100 5100   PI 3.2 4.9 5.1 4 5.8 5.6 5.1   Power (Serna) 3.6 3.5 3.6 3.6 3.5 3.7 3.6   LSL 4700 4700 4700  4700     Pulsatility Intermittent pulse Intermittent pulse Intermittent pulse  Intermittent pulse            Rounded on Kevan Queen to ensure all mechanical assist device settings (IABP or VAD) were appropriate and all parameters were within limits.  I was able to ensure all back up equipment was present, the staff had no issues, and the Perfusion Department daily rounding was complete.      For implantable VADs: Interrogation of Ventricular assist device was performed with analysis of device parameters and review of device function. I have personally reviewed the interrogation findings and agree with findings as stated.     In emergency, the nursing units have been notified to contact the perfusion department either by:  Calling [phone] from 630am to 4pm Mon thru Fri, utilizing the On-Call Finder functionality of Epic and searching for Perfusion, or by contacting the hospital  from 4pm to 630am and on weekends and asking to speak with the perfusionist on call.    4:18 PM

## 2023-11-13 NOTE — NURSING
Pt has not had a bowel movement since 11/8. This RN asked if pt would like any stool softeners however pt refused. LUCÍA Lara notified.

## 2023-11-13 NOTE — ASSESSMENT & PLAN NOTE
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 Patient was admitted for concerns of infected scICD. [employer] scICD (10/2020 at [facility]) protruding from L chest wall on admit. Underwent extraction on 11/9/2023 complicated with hematoma/ bleeding with wound vac removal. General surgery did suture ligation of bleeding vessels and application of hemostatic agents. Extracted device is growing MRSA on aerobic cultures 11/11/2023.   - ID consulted, recommendations appreciated   - Was on warfarin for AC to prevent VAD thrombosis. INR 1.1 11/13/2023.  - Device Interrogation. No VT shocks prior  - Wound care consult for wound vac consideration   If new ICD for primary prevention is required, please contact EP

## 2023-11-13 NOTE — PLAN OF CARE
Outpatient Antibiotic Therapy Plan:    Please send referral to [facility].    1) Infection: ICD pocket infection    2) Discharge Antibiotics:    Intravenous antibiotics:  Daptomycin 8mg/kg IV q 24 hours       3) Therapy Duration:  2 weeks    Estimated end date of IV antibiotics: 11/23/23    4) Outpatient Weekly Labs:    Order the following labs to be drawn on Mondays:   CBC  CMP   CPK (when on Daptomycin)    5) Fax Lab Results to Infectious Diseases Provider: Dr. Willson    [facility] Fax Number: [phone]    6) Outpatient Infectious Diseases Follow-up    Follow-up appointment will be arranged by the ID clinic and will be found in the patient's appointments tab.    Prior to discharge, please ensure the patient's follow-up has been scheduled.    If there is still no follow-up scheduled prior to discharge, please send an EPIC message to Lynn Sheppard in Infectious Diseases.

## 2023-11-13 NOTE — ASSESSMENT & PLAN NOTE
Managed per primary team  Avoid hypoglycemia  Optimize BG control to improve wound healing  Infection may elevate BG readings  On IV antibiotics         crib

## 2023-11-13 NOTE — CONSULTS
[facility] - Cardiology Stepdown  Wound Care    Patient Name:  Kevan Queen   MRN:  [id number]  Date: 2023  Diagnosis: ICD (implantable cardioverter-defibrillator) malfunction    History:     Past Medical History:   Diagnosis Date    Acute respiratory failure with hypoxia 2023    Arthritis     Awareness alteration, transient 2022    Cardiomyopathy     CHF (congestive heart failure) 10/01/2020    COVID-19 6/3/2023    Diabetes mellitus     Dilated cardiomyopathy 10/26/2020    Drug abuse 10/2020    Headache 2023    Hyperglycemia 2022    Hyperosmolar hyperglycemic state (HHS) 2022    ICD (implantable cardioverter-defibrillator) in place 10/26/2020    Left ventricular assist device (LVAD) complication, initial encounter 2023    Muscle cramping 6/15/2022    Renal disorder     SOB (shortness of breath) 2022    Tingling in extremities 2022       Social History     Socioeconomic History    Marital status: Legally    Tobacco Use    Smoking status: Former     Current packs/day: 0.00     Average packs/day: 0.5 packs/day for 16.6 years (8.3 ttl pk-yrs)     Types: Cigarettes     Start date: 10/1/2004     Quit date: 2021     Years since quittin.5    Smokeless tobacco: Never   Substance and Sexual Activity    Alcohol use: Not Currently    Drug use: Not Currently     Types: Marijuana, MDMA (Ecstacy)    Sexual activity: Yes     Partners: Female     Birth control/protection: None       Precautions:     Allergies as of 2023 - Reviewed 2023   Allergen Reaction Noted    Bumex [bumetanide] Hives 2022    Torsemide Hives 2022       WOC Assessment Details/Treatment   Patient seen for wound care consultation to left anterior chest  See Flow Sheet for findings.      Per chart review. Kevan Queen is a 38 y.o. male on LVAD presenting with c/o defibrillator coming out of his chest. He stated that this started a few months ago. He also c/o that his picc line  looks longer that the previous ones he has had. The picc line is functioning and there is no irritation around it. He uses it daily for his milrinone infusion. He states that his defibrillator has been coming out of his chest for around a week now. He denied chest trauma, fever, nausea, vomiting or diarrhea. Breathing is at baseline and comfortable at rest. Denies orthopnea or PND or LVAD alarm Wound etiology stemming from a surgical wound d/t defibrillator coming out. Wound care cleansed left anterior chest with Vashe. A Vashe wet to moist packed into the wound bed before applying an island dressing on top. Wound care communicated with Marco Rothman NP.       RECOMMENDATIONS  Recommendations made to primary team for above plan via secured chat. Wound Care to follow up. Orders placed.     Discussed POC with patient and primary RN.   See EMR for orders & patient education.  Discussed POC with primary team.    Nursing to continue care.  Nursing to maintain pressure injury prevention interventions.  Contact wound care for any further questions.         11/13/23 1020   WOCN Assessment   WOCN Total Time (mins) 30   Visit Date 11/13/23   Visit Time 1020   Consult Type New   WOCN Speciality Wound   Wound surgical   Intervention assessed;changed;applied;chart review;coordination of care;orders   Teaching on-going        Altered Skin Integrity 11/08/23 1842 Left Clavicle Other (comment)   Date First Assessed/Time First Assessed: 11/08/23 1842   Altered Skin Integrity Present on Admission - Did Patient arrive to the hospital with altered skin?: yes  Side: Left  Location: Clavicle  Primary Wound Type: Other (comment)   Wound Image    Dressing Appearance Dry;Intact;Clean   Drainage Amount Small   Drainage Characteristics/Odor Serosanguineous;No odor   Appearance Red;Black;Yellow;Dry;Adipose;Muscle   Tissue loss description Full thickness   Wound Length (cm) 6.5 cm   Wound Width (cm) 4 cm   Wound Depth (cm) 0.5 cm   Wound Volume  (cm^3) 13 cm^3   Wound Surface Area (cm^2) 26 cm^2   Undermining (depth (cm)/location) 7cm @ 10:00 from 6-12   Care Cleansed with:;Antimicrobial agent   Dressing Applied;Gauze, wet to moist;Island/border   Packing packing removed;packed with;gauze, iodoform   Packing Inserted  1   Packing Removed 1   Dressing Change Due 11/13/23 11/13/2023

## 2023-11-13 NOTE — SUBJECTIVE & OBJECTIVE
"Interval HPI:   No acute events overnight. Patient in room 309/309 A. Blood glucose variable. BG at and above goal on current insulin regimen (Transition Insulin Drip). Steroid use- None.   4 Days Post-Op  Renal function- Normal   Vasopressors-  None     Diet diabetic Cardiac (Low Na/Chol); 2000 Calorie; Fluid - 1500mL; Standard Tray     Eatin%  Nausea: No  Hypoglycemia and intervention: No  Fever: No  TPN and/or TF: No    BP (!) 76/0 (BP Location: Left arm, Patient Position: Lying)   Pulse 72   Temp 98.2 °F (36.8 °C)   Resp 20   Ht 6' 3" (1.905 m)   Wt 86 kg (189 lb 9.5 oz)   SpO2 100%   BMI 23.70 kg/m²     Labs Reviewed and Include    Recent Labs   Lab 23  0407   *   CALCIUM 9.1   ALBUMIN 2.9*  2.9*   PROT 7.1  7.1      K 4.3   CO2 28   CL 98   BUN 13   CREATININE 1.2   ALKPHOS 131  131   ALT 15  15   AST 25  25   BILITOT 0.8  0.8     Lab Results   Component Value Date    WBC 12.26 2023    HGB 8.1 (L) 2023    HCT 26.0 (L) 2023    MCV 76 (L) 2023     2023     No results for input(s): "TSH", "FREET4" in the last 168 hours.  Lab Results   Component Value Date    HGBA1C 8.9 (H) 2023       Nutritional status:   Body mass index is 23.7 kg/m².  Lab Results   Component Value Date    ALBUMIN 2.9 (L) 2023    ALBUMIN 2.9 (L) 2023    ALBUMIN 2.7 (L) 2023     Lab Results   Component Value Date    PREALBUMIN 17 (L) 2023    PREALBUMIN 15 (L) 2023    PREALBUMIN 21 2023       Estimated Creatinine Clearance: 99.8 mL/min (based on SCr of 1.2 mg/dL).    Accu-Checks  Recent Labs     23  0356 23  0746 23  1100 23  1613 23  1935 23  2104 23  0004 23  0402 23  0718 23  1141   POCTGLUCOSE 100 178* 287* 173* 240* 197* 194* 177* 272* 125*       Current Medications and/or Treatments Impacting Glycemic Control  Immunotherapy:    Immunosuppressants       None      "     Steroids:   Hormones (From admission, onward)      None          Pressors:    Autonomic Drugs (From admission, onward)      None          Hyperglycemia/Diabetes Medications:   Antihyperglycemics (From admission, onward)      Start     Stop Route Frequency Ordered    11/13/23 1100  insulin detemir U-100 (Levemir) pen 18 Units         -- SubQ 2 times daily 11/13/23 1055    11/13/23 1100  insulin aspart U-100 pen 8 Units         -- SubQ 3 times daily with meals 11/13/23 1055

## 2023-11-13 NOTE — PROGRESS NOTES
"[facility]  Infectious Disease  Progress Note    Patient Name: Kevan Queen  MRN: [id number]  Admission Date: 11/8/2023  Length of Stay: 5 days  Attending Physician: Josh Ryan, *  Primary Care Provider: Rosio Armendariz FNP    Isolation Status: No active isolations  Assessment/Plan:      ID  ICD (implantable cardioverter-defibrillator) infection  38M with PMH of NICM EF 10-15%, ICD placement in Jan 2022, HM3 LVAD placement on 6/29/23 with DLESI 2/2 MSSA in Sept 2023, previous MV repair, DM2, and epilepsy admitted for erosion of ICD s/p complete device removal - OR cx (pocket with MSSA and MRSE, lead cx so far no growth to date). Blood cultures on admit and repeat no growth to date. Was on chronic cefadroxil at home for previous LVAD MSSA infection.     Recommendations:  Change vancomycin to daptomycin - confirmed sensitivity with micro lab  Will need 2 weeks of treatment from date of ICD removal        Anticipated Disposition: TBD    Thank you for your consult. I will sign off. Please contact us if you have any additional questions.    Jo Ann Willson,   Infectious Disease  [facility]    Subjective:     Principal Problem:ICD (implantable cardioverter-defibrillator) malfunction    HPI: Mr. Queen is a 38M with PMH of NICM EF 10-15%, ICD placement in Jan 2022, HM3 LVAD placement on 6/29/23 with DLESI 2/2 MSSA in Sept 2023, previous MV repair, DM2, epilepsy, and polysubstance abuse, presents 1 week after ICD began eroding through chest with some purulence. Infectious disease consulted for "ICD is fully exposed and hanging out. Pt on broad spectrum abx".     EP team has evaluated patient is planning for complete device removal. He received one dose each of vancomycin and zosyn in the ER. He does have a RUE PICC for home milrinone. Patient had a recent admission in September 2023 for MSSA bacteremia with DLESI and L empyema, was treated with ancef. He also had COVID at " that time.         Interval History:     Patient reports continued pain at the surgical site, but it has been improving. Denies any other symptoms.     Review of Systems   Constitutional:  Negative for chills and fever.   Respiratory:  Negative for cough and shortness of breath.    Cardiovascular:  Positive for chest pain (at ICD surgical site).   Gastrointestinal:  Negative for abdominal pain, constipation, diarrhea, nausea and vomiting.   Genitourinary:  Negative for dysuria and hematuria.   Musculoskeletal:  Negative for arthralgias and myalgias.   Skin:  Positive for wound. Negative for rash.   Neurological:  Negative for weakness and headaches.     Objective:     Vital Signs (Most Recent):  Temp: 98.4 °F (36.9 °C) (11/13/23 0709)  Pulse: 97 (11/13/23 1112)  Resp: 18 (11/13/23 0709)  BP: (!) 80/0 (11/13/23 0709)  SpO2: 100 % (11/13/23 0709) Vital Signs (24h Range):  Temp:  [98.1 °F (36.7 °C)-98.9 °F (37.2 °C)] 98.4 °F (36.9 °C)  Pulse:  [] 97  Resp:  [18-20] 18  SpO2:  [97 %-100 %] 100 %  BP: (80-88)/(0) 80/0     Weight: 86 kg (189 lb 9.5 oz)  Body mass index is 23.7 kg/m².    Estimated Creatinine Clearance: 99.8 mL/min (based on SCr of 1.2 mg/dL).     Physical Exam  Vitals reviewed.   Constitutional:       General: He is not in acute distress.     Appearance: Normal appearance. He is not ill-appearing.   HENT:      Head: Normocephalic and atraumatic.   Eyes:      Extraocular Movements: Extraocular movements intact.      Conjunctiva/sclera: Conjunctivae normal.   Cardiovascular:      Rate and Rhythm: Normal rate and regular rhythm.      Heart sounds: No murmur heard.     Comments: Surgical site with dressing in place that is clean, dry, intact  Pulmonary:      Effort: Pulmonary effort is normal. No respiratory distress.      Breath sounds: Normal breath sounds.   Abdominal:      General: Abdomen is flat. Bowel sounds are normal.      Palpations: Abdomen is soft.      Tenderness: There is no abdominal  tenderness.   Musculoskeletal:      Cervical back: Normal range of motion.   Skin:     General: Skin is warm and dry.   Neurological:      General: No focal deficit present.      Mental Status: He is alert and oriented to person, place, and time.   Psychiatric:         Mood and Affect: Mood normal.         Behavior: Behavior normal.         Thought Content: Thought content normal.          Significant Labs: All pertinent labs within the past 24 hours have been reviewed.  Recent Lab Results  (Last 5 results in the past 24 hours)        11/13/23  0718   11/13/23  0407   11/13/23  0402   11/13/23  0004   11/12/23  2104        Albumin   2.9                2.9             ALP   131                131             ALT   15                15             Anion Gap   10             aPTT   22.7  Comment: Refer to local heparin nomogram for intensity/dose specific   therapeutic   range.               AST   25                25             Baso #   0.05             Basophil %   0.4             Bilirubin Direct   0.4             BILIRUBIN TOTAL   0.8  Comment: For infants and newborns, interpretation of results should be based  on gestational age, weight and in agreement with clinical  observations.    Premature Infant recommended reference ranges:  Up to 24 hours.............<8.0 mg/dL  Up to 48 hours............<12.0 mg/dL  3-5 days..................<15.0 mg/dL  6-29 days.................<15.0 mg/dL                  0.8  Comment: For infants and newborns, interpretation of results should be based  on gestational age, weight and in agreement with clinical  observations.    Premature Infant recommended reference ranges:  Up to 24 hours.............<8.0 mg/dL  Up to 48 hours............<12.0 mg/dL  3-5 days..................<15.0 mg/dL  6-29 days.................<15.0 mg/dL               BNP   116  Comment: Values of less than 100 pg/ml are consistent with non-CHF populations.             BUN   13             Calcium   9.1              Chloride   98             CO2   28             Creatinine   1.2             CRP   1.9             Differential Method   Automated             eGFR   >60.0             Eos #   0.1             Eosinophil %   0.9             Estimated Avg Glucose   209             Glucose   175             Gran # (ANC)   8.3             Gran %   67.3             Hematocrit   26.0             Hemoglobin   8.1             Hemoglobin A1C External   8.9  Comment: ADA Screening Guidelines:  5.7-6.4%  Consistent with prediabetes  >or=6.5%  Consistent with diabetes    High levels of fetal hemoglobin interfere with the HbA1C  assay. Heterozygous hemoglobin variants (HbS, HgC, etc)do  not significantly interfere with this assay.   However, presence of multiple variants may affect accuracy.               Immature Grans (Abs)   0.04  Comment: Mild elevation in immature granulocytes is non specific and   can be seen in a variety of conditions including stress response,   acute inflammation, trauma and pregnancy. Correlation with other   laboratory and clinical findings is essential.               Immature Granulocytes   0.3             INR   1.1  Comment: Coumadin Therapy:  2.0 - 3.0 for INR for all indicators except mechanical heart valves  and antiphospholipid syndromes which should use 2.5 - 3.5.               LD   261  Comment: Results are increased in hemolyzed samples.             Lymph #   2.8             Lymph %   22.9             Magnesium    1.9             MCH   23.7             MCHC   31.2             MCV   76             Mono #   1.0             Mono %   8.2             MPV   10.8             nRBC   0             Phosphorus Level   2.6             Platelet Count   338             POCT Glucose 272     177   194   197       Potassium   4.3             Prealbumin   17             PROTEIN TOTAL   7.1                7.1             Protime   11.6             RBC   3.42             RDW   20.5             Sodium   136              Vancomycin-Trough   16.4             WBC   12.26                                    Significant Imaging: I have reviewed all pertinent imaging results/findings within the past 24 hours.

## 2023-11-13 NOTE — ASSESSMENT & PLAN NOTE
-Patient presented with [place] AICD falling out of L chest wall. Apparently had been this way for 1 week  -Unclear reason for dehiscence of ICD site  -BCx's NGTD.  Continue Vanc  -s/p device extraction complicated by hematoma so wound vac removed. Hemostasis achieved with general surgery help.  -Wound care following.   -Orders for BID wet to dry dressing   -Lifevest ordered

## 2023-11-13 NOTE — ASSESSMENT & PLAN NOTE
-Patient presented with [place] AICD falling out of L chest wall. Apparently had been this way for 1 week  -Unclear reason for dehiscence of ICD site  -BCx's NGTD.  Continue Vanc  -s/p device extraction complicated by hematoma so wound vac removed. Hemostasis achieved with general surgery help.  -Wound care following.   -Orders for BID wet to dry dressing   -Lifevest ordered

## 2023-11-13 NOTE — ASSESSMENT & PLAN NOTE
Endocrinology consulted for BG management.   BG goal 140-180     - will d/c transition drip as patient may be getting discharged soon   - Start levemir 18 units BID (transitioned based on Tgtt requirements)   - Novolog (aspart) insulin 8 Units SQ TIDWM and prn for BG excursions [facility] SSI (150/25).  - BG checks AC/HS   - Hypoglycemia protocol in place    ** Please notify Endocrine for any change and/or advance in diet**  ** Please call Endocrine for any BG related issues **    Discharge Planning:   TBD. Please notify endocrinology prior to discharge.

## 2023-11-14 PROBLEM — L03.90 CELLULITIS: Status: ACTIVE | Noted: 2023-11-14

## 2023-11-14 LAB
ALBUMIN SERPL BCP-MCNC: 2.9 G/DL (ref 3.5–5.2)
ALP SERPL-CCNC: 131 U/L (ref 55–135)
ALT SERPL W/O P-5'-P-CCNC: 17 U/L (ref 10–44)
ANION GAP SERPL CALC-SCNC: 8 MMOL/L (ref 8–16)
APTT PPP: 24.2 SEC (ref 21–32)
AST SERPL-CCNC: 25 U/L (ref 10–40)
BASOPHILS # BLD AUTO: 0.05 K/UL (ref 0–0.2)
BASOPHILS NFR BLD: 0.5 % (ref 0–1.9)
BILIRUB SERPL-MCNC: 0.8 MG/DL (ref 0.1–1)
BUN SERPL-MCNC: 12 MG/DL (ref 6–20)
CALCIUM SERPL-MCNC: 9.2 MG/DL (ref 8.7–10.5)
CHLORIDE SERPL-SCNC: 100 MMOL/L (ref 95–110)
CO2 SERPL-SCNC: 28 MMOL/L (ref 23–29)
CREAT SERPL-MCNC: 1.2 MG/DL (ref 0.5–1.4)
DIFFERENTIAL METHOD: ABNORMAL
EOSINOPHIL # BLD AUTO: 0.1 K/UL (ref 0–0.5)
EOSINOPHIL NFR BLD: 1.3 % (ref 0–8)
ERYTHROCYTE [DISTWIDTH] IN BLOOD BY AUTOMATED COUNT: 21 % (ref 11.5–14.5)
EST. GFR  (NO RACE VARIABLE): >60 ML/MIN/1.73 M^2
GLUCOSE SERPL-MCNC: 157 MG/DL (ref 70–110)
HCT VFR BLD AUTO: 25.1 % (ref 40–54)
HGB BLD-MCNC: 7.9 G/DL (ref 14–18)
IMM GRANULOCYTES # BLD AUTO: 0.04 K/UL (ref 0–0.04)
IMM GRANULOCYTES NFR BLD AUTO: 0.4 % (ref 0–0.5)
INR PPP: 1.1 (ref 0.8–1.2)
LDH SERPL L TO P-CCNC: 236 U/L (ref 110–260)
LYMPHOCYTES # BLD AUTO: 2.5 K/UL (ref 1–4.8)
LYMPHOCYTES NFR BLD: 24.1 % (ref 18–48)
MAGNESIUM SERPL-MCNC: 2 MG/DL (ref 1.6–2.6)
MCH RBC QN AUTO: 24 PG (ref 27–31)
MCHC RBC AUTO-ENTMCNC: 31.5 G/DL (ref 32–36)
MCV RBC AUTO: 76 FL (ref 82–98)
MONOCYTES # BLD AUTO: 0.7 K/UL (ref 0.3–1)
MONOCYTES NFR BLD: 6.9 % (ref 4–15)
NEUTROPHILS # BLD AUTO: 6.9 K/UL (ref 1.8–7.7)
NEUTROPHILS NFR BLD: 66.8 % (ref 38–73)
NRBC BLD-RTO: 0 /100 WBC
PHOSPHATE SERPL-MCNC: 3.1 MG/DL (ref 2.7–4.5)
PLATELET # BLD AUTO: 346 K/UL (ref 150–450)
PMV BLD AUTO: 10.6 FL (ref 9.2–12.9)
POCT GLUCOSE: 151 MG/DL (ref 70–110)
POCT GLUCOSE: 157 MG/DL (ref 70–110)
POCT GLUCOSE: 242 MG/DL (ref 70–110)
POCT GLUCOSE: 254 MG/DL (ref 70–110)
POCT GLUCOSE: 269 MG/DL (ref 70–110)
POTASSIUM SERPL-SCNC: 4.3 MMOL/L (ref 3.5–5.1)
PROT SERPL-MCNC: 7 G/DL (ref 6–8.4)
PROTHROMBIN TIME: 12.2 SEC (ref 9–12.5)
RBC # BLD AUTO: 3.29 M/UL (ref 4.6–6.2)
SODIUM SERPL-SCNC: 136 MMOL/L (ref 136–145)
WBC # BLD AUTO: 10.27 K/UL (ref 3.9–12.7)

## 2023-11-14 PROCEDURE — 25000003 PHARM REV CODE 250: Performed by: NURSE PRACTITIONER

## 2023-11-14 PROCEDURE — 93750 PR INTERROGATE VENT ASSIST DEV, IN PERSON, W PHYSICIAN ANALYSIS: ICD-10-PCS | Mod: ,,, | Performed by: INTERNAL MEDICINE

## 2023-11-14 PROCEDURE — 84100 ASSAY OF PHOSPHORUS: CPT | Performed by: NURSE PRACTITIONER

## 2023-11-14 PROCEDURE — 80053 COMPREHEN METABOLIC PANEL: CPT | Performed by: NURSE PRACTITIONER

## 2023-11-14 PROCEDURE — 93750 INTERROGATION VAD IN PERSON: CPT | Mod: ,,, | Performed by: INTERNAL MEDICINE

## 2023-11-14 PROCEDURE — 85025 COMPLETE CBC W/AUTO DIFF WBC: CPT | Performed by: NURSE PRACTITIONER

## 2023-11-14 PROCEDURE — 99231 PR SUBSEQUENT HOSPITAL CARE,LEVL I: ICD-10-PCS | Mod: ,,, | Performed by: STUDENT IN AN ORGANIZED HEALTH CARE EDUCATION/TRAINING PROGRAM

## 2023-11-14 PROCEDURE — 63600175 PHARM REV CODE 636 W HCPCS: Performed by: NURSE PRACTITIONER

## 2023-11-14 PROCEDURE — 94761 N-INVAS EAR/PLS OXIMETRY MLT: CPT

## 2023-11-14 PROCEDURE — 99223 1ST HOSP IP/OBS HIGH 75: CPT | Mod: ,,, | Performed by: INTERNAL MEDICINE

## 2023-11-14 PROCEDURE — A4216 STERILE WATER/SALINE, 10 ML: HCPCS | Performed by: INTERNAL MEDICINE

## 2023-11-14 PROCEDURE — 99231 SBSQ HOSP IP/OBS SF/LOW 25: CPT | Mod: ,,, | Performed by: STUDENT IN AN ORGANIZED HEALTH CARE EDUCATION/TRAINING PROGRAM

## 2023-11-14 PROCEDURE — 25000003 PHARM REV CODE 250: Performed by: INTERNAL MEDICINE

## 2023-11-14 PROCEDURE — 99232 SBSQ HOSP IP/OBS MODERATE 35: CPT | Mod: ,,,

## 2023-11-14 PROCEDURE — 99223 PR INITIAL HOSPITAL CARE,LEVL III: ICD-10-PCS | Mod: ,,, | Performed by: INTERNAL MEDICINE

## 2023-11-14 PROCEDURE — 25000003 PHARM REV CODE 250: Performed by: STUDENT IN AN ORGANIZED HEALTH CARE EDUCATION/TRAINING PROGRAM

## 2023-11-14 PROCEDURE — 99497 PR ADVNCD CARE PLAN 30 MIN: ICD-10-PCS | Mod: 25,,, | Performed by: INTERNAL MEDICINE

## 2023-11-14 PROCEDURE — 99233 SBSQ HOSP IP/OBS HIGH 50: CPT | Mod: FS,,, | Performed by: NURSE PRACTITIONER

## 2023-11-14 PROCEDURE — 27000248 HC VAD-ADDITIONAL DAY

## 2023-11-14 PROCEDURE — 99497 ADVNCD CARE PLAN 30 MIN: CPT | Mod: 25,,, | Performed by: INTERNAL MEDICINE

## 2023-11-14 PROCEDURE — 20600001 HC STEP DOWN PRIVATE ROOM

## 2023-11-14 PROCEDURE — 83615 LACTATE (LD) (LDH) ENZYME: CPT | Performed by: NURSE PRACTITIONER

## 2023-11-14 PROCEDURE — 83735 ASSAY OF MAGNESIUM: CPT | Performed by: NURSE PRACTITIONER

## 2023-11-14 PROCEDURE — 85730 THROMBOPLASTIN TIME PARTIAL: CPT | Performed by: NURSE PRACTITIONER

## 2023-11-14 PROCEDURE — 85610 PROTHROMBIN TIME: CPT | Performed by: NURSE PRACTITIONER

## 2023-11-14 PROCEDURE — 63600175 PHARM REV CODE 636 W HCPCS

## 2023-11-14 PROCEDURE — 99233 PR SUBSEQUENT HOSPITAL CARE,LEVL III: ICD-10-PCS | Mod: FS,,, | Performed by: NURSE PRACTITIONER

## 2023-11-14 PROCEDURE — 99232 PR SUBSEQUENT HOSPITAL CARE,LEVL II: ICD-10-PCS | Mod: ,,,

## 2023-11-14 PROCEDURE — 63600175 PHARM REV CODE 636 W HCPCS: Performed by: STUDENT IN AN ORGANIZED HEALTH CARE EDUCATION/TRAINING PROGRAM

## 2023-11-14 RX ORDER — INSULIN ASPART 100 [IU]/ML
0-10 INJECTION, SOLUTION INTRAVENOUS; SUBCUTANEOUS
Status: DISCONTINUED | OUTPATIENT
Start: 2023-11-14 | End: 2023-11-15 | Stop reason: HOSPADM

## 2023-11-14 RX ORDER — AMOXICILLIN 250 MG
2 CAPSULE ORAL 2 TIMES DAILY
Status: DISCONTINUED | OUTPATIENT
Start: 2023-11-14 | End: 2023-11-15 | Stop reason: HOSPADM

## 2023-11-14 RX ORDER — POLYETHYLENE GLYCOL 3350 17 G/17G
17 POWDER, FOR SOLUTION ORAL DAILY
Status: DISCONTINUED | OUTPATIENT
Start: 2023-11-14 | End: 2023-11-15 | Stop reason: HOSPADM

## 2023-11-14 RX ORDER — INSULIN ASPART 100 [IU]/ML
8 INJECTION, SOLUTION INTRAVENOUS; SUBCUTANEOUS ONCE
Status: COMPLETED | OUTPATIENT
Start: 2023-11-14 | End: 2023-11-14

## 2023-11-14 RX ORDER — METHOCARBAMOL 500 MG/1
1000 TABLET, FILM COATED ORAL 4 TIMES DAILY
Status: DISCONTINUED | OUTPATIENT
Start: 2023-11-14 | End: 2023-11-15 | Stop reason: HOSPADM

## 2023-11-14 RX ORDER — INSULIN ASPART 100 [IU]/ML
10 INJECTION, SOLUTION INTRAVENOUS; SUBCUTANEOUS
Status: DISCONTINUED | OUTPATIENT
Start: 2023-11-14 | End: 2023-11-15 | Stop reason: HOSPADM

## 2023-11-14 RX ADMIN — Medication 400 MG: at 08:11

## 2023-11-14 RX ADMIN — MILRINONE LACTATE IN DEXTROSE 0.25 MCG/KG/MIN: 200 INJECTION, SOLUTION INTRAVENOUS at 08:11

## 2023-11-14 RX ADMIN — METHOCARBAMOL 1000 MG: 500 TABLET ORAL at 04:11

## 2023-11-14 RX ADMIN — PANTOPRAZOLE SODIUM 40 MG: 40 TABLET, DELAYED RELEASE ORAL at 08:11

## 2023-11-14 RX ADMIN — METHOCARBAMOL 500 MG: 500 TABLET ORAL at 08:11

## 2023-11-14 RX ADMIN — Medication 10 ML: at 06:11

## 2023-11-14 RX ADMIN — INSULIN DETEMIR 18 UNITS: 100 INJECTION, SOLUTION SUBCUTANEOUS at 08:11

## 2023-11-14 RX ADMIN — DAPTOMYCIN 690 MG: 350 INJECTION, POWDER, LYOPHILIZED, FOR SOLUTION INTRAVENOUS at 04:11

## 2023-11-14 RX ADMIN — INSULIN ASPART 10 UNITS: 100 INJECTION, SOLUTION INTRAVENOUS; SUBCUTANEOUS at 05:11

## 2023-11-14 RX ADMIN — INSULIN ASPART 10 UNITS: 100 INJECTION, SOLUTION INTRAVENOUS; SUBCUTANEOUS at 12:11

## 2023-11-14 RX ADMIN — FUROSEMIDE 80 MG: 80 TABLET ORAL at 08:11

## 2023-11-14 RX ADMIN — INSULIN DETEMIR 18 UNITS: 100 INJECTION, SOLUTION SUBCUTANEOUS at 09:11

## 2023-11-14 RX ADMIN — Medication 10 ML: at 12:11

## 2023-11-14 RX ADMIN — INSULIN ASPART 6 UNITS: 100 INJECTION, SOLUTION INTRAVENOUS; SUBCUTANEOUS at 12:11

## 2023-11-14 RX ADMIN — SENNOSIDES AND DOCUSATE SODIUM 2 TABLET: 8.6; 5 TABLET ORAL at 08:11

## 2023-11-14 RX ADMIN — SPIRONOLACTONE 25 MG: 25 TABLET ORAL at 08:11

## 2023-11-14 RX ADMIN — INSULIN ASPART 2 UNITS: 100 INJECTION, SOLUTION INTRAVENOUS; SUBCUTANEOUS at 05:11

## 2023-11-14 RX ADMIN — INSULIN ASPART 6 UNITS: 100 INJECTION, SOLUTION INTRAVENOUS; SUBCUTANEOUS at 08:11

## 2023-11-14 RX ADMIN — Medication 10 ML: at 05:11

## 2023-11-14 RX ADMIN — INSULIN ASPART 10 UNITS: 100 INJECTION, SOLUTION INTRAVENOUS; SUBCUTANEOUS at 09:11

## 2023-11-14 RX ADMIN — WARFARIN SODIUM 7.5 MG: 7.5 TABLET ORAL at 04:11

## 2023-11-14 RX ADMIN — METHOCARBAMOL 1000 MG: 500 TABLET ORAL at 12:11

## 2023-11-14 RX ADMIN — METHOCARBAMOL 1000 MG: 500 TABLET ORAL at 08:11

## 2023-11-14 RX ADMIN — LEVETIRACETAM 1000 MG: 500 TABLET, FILM COATED ORAL at 08:11

## 2023-11-14 RX ADMIN — INSULIN ASPART 8 UNITS: 100 INJECTION, SOLUTION INTRAVENOUS; SUBCUTANEOUS at 09:11

## 2023-11-14 RX ADMIN — MILRINONE LACTATE IN DEXTROSE 0.25 MCG/KG/MIN: 200 INJECTION, SOLUTION INTRAVENOUS at 05:11

## 2023-11-14 RX ADMIN — ATORVASTATIN CALCIUM 40 MG: 40 TABLET, FILM COATED ORAL at 08:11

## 2023-11-14 RX ADMIN — ASPIRIN 81 MG: 81 TABLET, COATED ORAL at 08:11

## 2023-11-14 RX ADMIN — MIRTAZAPINE 15 MG: 15 TABLET, FILM COATED ORAL at 08:11

## 2023-11-14 NOTE — PLAN OF CARE
Problem: Adult Inpatient Plan of Care  Goal: Plan of Care Review  Outcome: Ongoing, Progressing  Goal: Patient-Specific Goal (Individualized)  Outcome: Ongoing, Progressing  Goal: Absence of Hospital-Acquired Illness or Injury  Outcome: Ongoing, Progressing  Goal: Optimal Comfort and Wellbeing  Outcome: Ongoing, Progressing  Goal: Readiness for Transition of Care  Outcome: Ongoing, Progressing     Problem: Diabetes Comorbidity  Goal: Blood Glucose Level Within Targeted Range  Outcome: Ongoing, Progressing     Problem: Infection  Goal: Absence of Infection Signs and Symptoms  Outcome: Ongoing, Progressing     Problem: Impaired Wound Healing  Goal: Optimal Wound Healing  Outcome: Ongoing, Progressing     Problem: Fall Injury Risk  Goal: Absence of Fall and Fall-Related Injury  Outcome: Ongoing, Progressing     Problem: Skin Injury Risk Increased  Goal: Skin Health and Integrity  Outcome: Ongoing, Progressing     Problem: Coping Ineffective  Goal: Effective Coping  Outcome: Ongoing, Progressing

## 2023-11-14 NOTE — SUBJECTIVE & OBJECTIVE
Interval History: Clean pressure dressing overlying procedure site. No obvious drainage, erythema, nor swelling.    Review of Systems   Constitutional: Negative for chills and fever.   Cardiovascular:  Negative for chest pain, orthopnea and palpitations.   Respiratory:  Negative for cough and shortness of breath.    Gastrointestinal:  Negative for abdominal pain.   Neurological:  Negative for dizziness, headaches and light-headedness.   Psychiatric/Behavioral:  Negative for altered mental status.      Objective:     Vital Signs (Most Recent):  Temp: 98.3 °F (36.8 °C) (11/14/23 0725)  Pulse: 110 (11/14/23 0725)  Resp: 18 (11/14/23 0725)  BP: (!) 80/0 (11/14/23 0725)  SpO2: 98 % (11/14/23 0725) Vital Signs (24h Range):  Temp:  [97.9 °F (36.6 °C)-98.9 °F (37.2 °C)] 98.3 °F (36.8 °C)  Pulse:  [] 110  Resp:  [18-20] 18  SpO2:  [98 %-100 %] 98 %  BP: (76-86)/(0) 80/0     Weight: 84.9 kg (187 lb 2.7 oz)  Body mass index is 23.39 kg/m².     SpO2: 98 %        Physical Exam  Vitals reviewed.   Constitutional:       General: He is not in acute distress.     Appearance: Normal appearance. He is not toxic-appearing.   HENT:      Head: Normocephalic and atraumatic.      Mouth/Throat:      Mouth: Mucous membranes are moist.   Cardiovascular:      Rate and Rhythm: Normal rate and regular rhythm.      Heart sounds: No murmur heard.     No friction rub. No gallop.      Comments: LUCW prominence from hematoma in pocket site post extraction  VAD hum+  Pulmonary:      Effort: Pulmonary effort is normal. No respiratory distress.      Breath sounds: Normal breath sounds.   Abdominal:      Palpations: Abdomen is soft.      Tenderness: There is no abdominal tenderness.   Musculoskeletal:      Right lower leg: No edema.      Left lower leg: No edema.   Skin:     General: Skin is warm.   Neurological:      Mental Status: He is alert. Mental status is at baseline.        Significant Labs: EP:   Recent Labs   Lab 11/12/23  1303  11/12/23 2052 11/13/23 0407 11/13/23 0407 11/14/23  0438   NA  --  135* 136  --  136   K  --  4.1 4.3  --  4.3   CL  --  97 98  --  100   CO2  --  30* 28  --  28   GLU  --  192* 175*  --  157*   BUN  --  14 13  --  12   CREATININE  --  1.4 1.2  --  1.2   CALCIUM  --  8.9 9.1  --  9.2   PROT  --   --  7.1  7.1  --  7.0   ALBUMIN  --   --  2.9*  2.9*  --  2.9*   BILITOT  --   --  0.8  0.8  --  0.8   ALKPHOS  --   --  131  131  --  131   AST  --   --  25  25  --  25   ALT  --   --  15  15  --  17   ANIONGAP  --  8 10  --  8   WBC  --   --  12.26  --  10.27   HGB 8.1*  --  8.1*  --  7.9*   HCT  --   --  26.0*   < > 25.1*   PLT  --   --  338  --  346   INR  --   --  1.1  --  1.1    < > = values in this interval not displayed.         Significant Imaging: Echocardiogram: Transthoracic echo (TTE) complete (Cupid Only):   Results for orders placed or performed during the hospital encounter of 08/31/23   Echo   Result Value Ref Range    Ascending aorta 2.99 cm    STJ 2.54 cm    IVS 0.75 0.6 - 1.1 cm    LA size 2.80 cm    Left Atrium Major Axis 6.27 cm    Left Atrium Minor Axis 5.13 cm    LVIDd 7.11 (A) 3.5 - 6.0 cm    LVIDs 7.01 (A) 2.1 - 4.0 cm    LVOT diameter 2.28 cm    Posterior Wall 0.57 (A) 0.6 - 1.1 cm    MV Peak A Carloz 0.61 m/s    E wave deceleration time 113.67 msec    MV Peak E Carloz 0.64 m/s    RA Major Axis 5.87 cm    RA Width 4.79 cm    RVDD 6.66 cm    Sinus 3.04 cm    TAPSE 1.16 cm    TR Max Carloz 2.72 m/s    TDI LATERAL 0.05 m/s    TDI SEPTAL 0.04 m/s    LA WIDTH 4.09 cm    MV stenosis pressure 1/2 time 32.97 ms    LV Diastolic Volume 264.44 mL    LV Systolic Volume 256.33 mL    RV S' 7.57 cm/s    MV mean gradient 1 mmHg    MV peak gradient 3 mmHg    MV VTI 17.13 cm    LV LATERAL E/E' RATIO 12.80 m/s    LV SEPTAL E/E' RATIO 16.00 m/s    FS 1 %    LA volume 54.93 cm3    LV mass 199.99 g    ZLVIDD -0.40     ZLVIDS 3.51     Left Ventricle Relative Wall Thickness 0.16 cm    MV valve area p 1/2 method 6.67 cm2     E/A ratio 1.05     Mean e' 0.05 m/s    LVOT area 4.1 cm2    E/E' ratio 14.22 m/s    LV Systolic Volume Index 117.0 mL/m2    LV Diastolic Volume Index 120.75 mL/m2    LA Volume Index 25.1 mL/m2    LV Mass Index 91 g/m2    Triscuspid Valve Regurgitation Peak Gradient 30 mmHg    BSA 2.18 m2    TV resting pulmonary artery pressure 38 mmHg    RV TB RVSP 11 mmHg    Est. RA pres 8 mmHg    Narrative      LVAD: The aortic valve does not open. The ventricular septum is at   midline.HM3 5100    Left Ventricle: The left ventricle is severely dilated. Ventricular   mass is normal. Normal wall thickness. Normal wall motion. There is   severely reduced systolic function with a visually estimated ejection   fraction of 10 -15%. There is normal diastolic function.    Right Ventricle: Normal right ventricular cavity size. Wall thickness   is normal. Right ventricle wall motion  is normal. Systolic function is   normal.    Mitral Valve: There is mild regurgitation.    Tricuspid Valve: There is moderate to severe regurgitation.    Pulmonary Artery: The estimated pulmonary artery systolic pressure is   38 mmHg.    IVC/SVC: Intermediate venous pressure at 8 mmHg.

## 2023-11-14 NOTE — SUBJECTIVE & OBJECTIVE
"Interval HPI:   No acute events overnight. Patient in room 309/309 A. Blood glucose variable. BG at and above goal on current insulin regimen (SSI, prandial, and basal insulin ). Steroid use- None.   5 Days Post-Op  Renal function- Normal   Vasopressors-  None     Diet diabetic Cardiac (Low Na/Chol); 2000 Calorie; Fluid - 1500mL; Standard Tray     Eatin%  Nausea: No  Hypoglycemia and intervention: No  Fever: No  TPN and/or TF: No    BP (!) 80/0 (BP Location: Left arm, Patient Position: Sitting)   Pulse 110   Temp 98.3 °F (36.8 °C) (Oral)   Resp 18   Ht 6' 3" (1.905 m)   Wt 84.9 kg (187 lb 2.7 oz)   SpO2 98%   BMI 23.39 kg/m²     Labs Reviewed and Include    Recent Labs   Lab 23  0438   *   CALCIUM 9.2   ALBUMIN 2.9*   PROT 7.0      K 4.3   CO2 28      BUN 12   CREATININE 1.2   ALKPHOS 131   ALT 17   AST 25   BILITOT 0.8     Lab Results   Component Value Date    WBC 10.27 2023    HGB 7.9 (L) 2023    HCT 25.1 (L) 2023    MCV 76 (L) 2023     2023     No results for input(s): "TSH", "FREET4" in the last 168 hours.  Lab Results   Component Value Date    HGBA1C 8.9 (H) 2023       Nutritional status:   Body mass index is 23.39 kg/m².  Lab Results   Component Value Date    ALBUMIN 2.9 (L) 2023    ALBUMIN 2.9 (L) 2023    ALBUMIN 2.9 (L) 2023     Lab Results   Component Value Date    PREALBUMIN 17 (L) 2023    PREALBUMIN 15 (L) 2023    PREALBUMIN 21 2023       Estimated Creatinine Clearance: 99.8 mL/min (based on SCr of 1.2 mg/dL).    Accu-Checks  Recent Labs     23  1613 23  1935 23  2104 23  0004 23  0402 23  0718 23  1141 23  1652 23  1954 23  0822   POCTGLUCOSE 173* 240* 197* 194* 177* 272* 125* 184* 336* 269*       Current Medications and/or Treatments Impacting Glycemic Control  Immunotherapy:    Immunosuppressants       None          Steroids: "   Hormones (From admission, onward)      None          Pressors:    Autonomic Drugs (From admission, onward)      None          Hyperglycemia/Diabetes Medications:   Antihyperglycemics (From admission, onward)      Start     Stop Route Frequency Ordered    11/14/23 1200  insulin aspart U-100 pen 10 Units         -- SubQ 4 times daily with meals & nightly 11/14/23 0847    11/14/23 0729  insulin aspart U-100 pen 0-10 Units         -- SubQ Before meals & nightly PRN 11/14/23 0729    11/13/23 1100  insulin detemir U-100 (Levemir) pen 18 Units         -- SubQ 2 times daily 11/13/23 1055

## 2023-11-14 NOTE — ASSESSMENT & PLAN NOTE
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 Patient was admitted for concerns of infected scICD. [place] scientific scICD (10/2020 at [facility]) protruding from L chest wall on admit. Underwent extraction on 11/9/2023 complicated with hematoma/ bleeding with wound vac removal. General surgery did suture ligation of bleeding vessels and application of hemostatic agents. Extracted device is growing MRSA on aerobic cultures 11/11/2023.   - ID consulted, recommendations appreciated   - Was on warfarin for AC to prevent VAD thrombosis. INR 1.1 11/14/2023.  - Device Interrogation. No VT shocks prior  - Wound care following for management of extraction site. Pending decision on ongoing packing vs reapplication of wound vac.  If new ICD for primary prevention is required, please contact EP.

## 2023-11-14 NOTE — PROGRESS NOTES
11/14/2023  Miracle Caballero    Current provider:  Dalia Crum MD    Device interrogation:      11/14/2023    12:00 PM 11/14/2023     8:18 AM 11/14/2023     4:13 AM 11/13/2023    11:29 PM 11/13/2023     8:17 PM 11/13/2023     5:23 PM 11/13/2023    11:43 AM   TXP LVAD INTERROGATIONS   Type HeartMate3 HeartMate3 HeartMate3 HeartMate3 HeartMate3 HeartMate3 HeartMate3   Flow 4.3 4.4 4.3 4.2 4.3 4 4   Speed 5100 5100 5100 5100 5100 5100 5100   PI 4.9 4.3 4.5 4.5 3.6 5.4 3.2   Power (Serna) 3.7 3.5 3.5 3.6 3.5 3.6 3.6   LSL 4700 4700 4700 4700 4700 4700 4700   Pulsatility  Intermittent pulse Intermittent pulse Intermittent pulse Intermittent pulse Intermittent pulse Intermittent pulse          Rounded on Kevan Queen to ensure all mechanical assist device settings (IABP or VAD) were appropriate and all parameters were within limits.  I was able to ensure all back up equipment was present, the staff had no issues, and the Perfusion Department daily rounding was complete.      For implantable VADs: Interrogation of Ventricular assist device was performed with analysis of device parameters and review of device function. I have personally reviewed the interrogation findings and agree with findings as stated.     In emergency, the nursing units have been notified to contact the perfusion department either by:  Calling [phone] from 630am to 4pm Mon thru Fri, utilizing the On-Call Finder functionality of Epic and searching for Perfusion, or by contacting the hospital  from 4pm to 630am and on weekends and asking to speak with the perfusionist on call.    3:35 PM

## 2023-11-14 NOTE — PROGRESS NOTES
[facility] - [facility]iology Stepdown  Heart Transplant  Progress Note    Patient Name: Kevan Queen  MRN: [id number]  Admission Date: 11/8/2023  Hospital Length of Stay: 6 days  Attending Physician: Dalia Crum MD  Primary Care Provider: Rosio Armendariz FNP  Principal Problem:ICD (implantable cardioverter-defibrillator) malfunction    Subjective:   Interval History: Patient reports that he is willing and able to learn to do bid wet to dry dressing changes to ICD system explant site and so is his wife Roybn. He would like to be discharged tomorrow if possible. Coumadin resumed last night, INR 1.1 but he is not a candidate for Heparin bridge given post op hematoma from ICD explant site. Life Vest has been ordered. ID changed antibiotics to Daptomycin X 2 weeks with end date 11/23    Continuous Infusions:   sodium chloride 0.9% Stopped (11/11/23 1021)    milrinone 20mg/100ml D5W (200mcg/ml) 0.25 mcg/kg/min (11/14/23 0522)     Scheduled Meds:   aspirin  81 mg Oral Daily    atorvastatin  40 mg Oral Daily    DAPTOmycin (CUBICIN) IV (PEDS and ADULTS)  8 mg/kg Intravenous Q24H    furosemide  80 mg Oral Daily    insulin aspart U-100  10 Units Subcutaneous QID (WM & HS)    insulin detemir U-100  18 Units Subcutaneous BID    levETIRAcetam  1,000 mg Oral BID    magnesium oxide  400 mg Oral BID    methocarbamoL  1,000 mg Oral QID    mirtazapine  15 mg Oral Nightly    pantoprazole  40 mg Oral Daily    polyethylene glycol  17 g Oral Daily    senna-docusate 8.6-50 mg  2 tablet Oral BID    sodium chloride 0.9%  10 mL Intravenous Q6H    spironolactone  25 mg Oral Daily    warfarin  7.5 mg Oral Daily     PRN Meds:acetaminophen, dextrose 10%, dextrose 10%, dextrose 10%, dextrose 10%, dextrose, dextrose, glucagon (human recombinant), glucose, influenza, insulin aspart U-100, sodium chloride 0.9%, Flushing PICC/Midline Protocol **AND** sodium chloride 0.9% **AND** sodium chloride 0.9%    Review of patient's allergies indicates:   Allergen  Reactions    Bumex [bumetanide] Hives    Torsemide Hives     Objective:     Vital Signs (Most Recent):  Temp: 98.3 °F (36.8 °C) (11/14/23 1046)  Pulse: (!) 124 (11/14/23 1200)  Resp: 18 (11/14/23 1200)  BP: (!) 78/0 (11/14/23 1200)  SpO2: 100 % (11/14/23 1046) Vital Signs (24h Range):  Temp:  [97.9 °F (36.6 °C)-98.9 °F (37.2 °C)] 98.3 °F (36.8 °C)  Pulse:  [] 124  Resp:  [18] 18  SpO2:  [98 %-100 %] 100 %  BP: (78-86)/(0) 78/0     Patient Vitals for the past 72 hrs (Last 3 readings):   Weight   11/14/23 0414 84.9 kg (187 lb 2.7 oz)   11/12/23 0438 86 kg (189 lb 9.5 oz)     Body mass index is 23.39 kg/m².      Intake/Output Summary (Last 24 hours) at 11/14/2023 1252  Last data filed at 11/14/2023 1206  Gross per 24 hour   Intake 1170 ml   Output 2525 ml   Net -1355 ml       Hemodynamic Parameters:  CVP:  [5 mmHg] 5 mmHg    Telemetry: ST       Physical Exam  Constitutional:       Appearance: Normal appearance.   HENT:      Head: Normocephalic and atraumatic.   Eyes:      Conjunctiva/sclera: Conjunctivae normal.      Pupils: Pupils are equal, round, and reactive to light.   Neck:      Comments: Do not appreciate elevated JVP  Pulmonary:      Effort: Pulmonary effort is normal.      Breath sounds: Normal breath sounds.   Abdominal:      General: Bowel sounds are normal.      Palpations: Abdomen is soft.   Musculoskeletal:         General: No swelling. Normal range of motion.      Cervical back: Normal range of motion and neck supple.   Skin:     General: Skin is warm and dry.      Capillary Refill: Capillary refill takes 2 to 3 seconds.   Neurological:      General: No focal deficit present.      Mental Status: He is alert and oriented to person, place, and time.   Psychiatric:         Mood and Affect: Mood normal.         Behavior: Behavior normal.         Thought Content: Thought content normal.         Judgment: Judgment normal.            Significant Labs:  CBC:  Recent Labs   Lab 11/12/23  0510 11/12/23  1304  11/13/23 0407 11/14/23 0438   WBC 11.45  --  12.26 10.27   RBC 3.32*  --  3.42* 3.29*   HGB 7.9*  7.8* 8.1* 8.1* 7.9*   HCT 25.2*  --  26.0* 25.1*     --  338 346   MCV 76*  --  76* 76*   MCH 23.8*  --  23.7* 24.0*   MCHC 31.3*  --  31.2* 31.5*     BNP:  Recent Labs   Lab 11/08/23  1407 11/13/23 0407   * 116*     CMP:  Recent Labs   Lab 11/12/23  0509 11/12/23 2052 11/13/23 0407 11/14/23 0438   * 192* 175* 157*   CALCIUM 8.9 8.9 9.1 9.2   ALBUMIN 2.7*  --  2.9*  2.9* 2.9*   PROT 6.6  --  7.1  7.1 7.0    135* 136 136   K 3.6 4.1 4.3 4.3   CO2 26 30* 28 28    97 98 100   BUN 12 14 13 12   CREATININE 1.3 1.4 1.2 1.2   ALKPHOS 112  --  131  131 131   ALT 8*  --  15  15 17   AST 19  --  25  25 25   BILITOT 0.9  --  0.8  0.8 0.8      Coagulation:   Recent Labs   Lab 11/12/23  0510 11/13/23 0407 11/14/23 0438   INR 1.2 1.1 1.1   APTT 23.9 22.7 24.2     LDH:  Recent Labs   Lab 11/12/23  0509 11/13/23 0407 11/14/23 0438    261* 236     Microbiology:  Microbiology Results (last 7 days)       Procedure Component Value Units Date/Time    Blood culture [[id number]] Collected: 11/10/23 0400    Order Status: Completed Specimen: Blood from Peripheral, Wrist, Left Updated: 11/14/23 0612     Blood Culture, Routine No Growth to date      No Growth to date      No Growth to date      No Growth to date      No Growth to date    Blood culture [[id number]] Collected: 11/10/23 0403    Order Status: Completed Specimen: Blood from Peripheral, Wrist, Right Updated: 11/14/23 0612     Blood Culture, Routine No Growth to date      No Growth to date      No Growth to date      No Growth to date      No Growth to date    Blood culture x two cultures. Draw prior to antibiotics. [[id number]] Collected: 11/08/23 1407    Order Status: Completed Specimen: Blood from Peripheral, Lower Arm, Left Updated: 11/13/23 1612     Blood Culture, Routine No growth after 5 days.    Narrative:      Aerobic and  anaerobic    Blood culture x two cultures. Draw prior to antibiotics. [[id number]] Collected: 11/08/23 1408    Order Status: Completed Specimen: Blood from Peripheral, Antecubital, Left Updated: 11/13/23 1612     Blood Culture, Routine No growth after 5 days.    Narrative:      Aerobic and anaerobic    Aerobic culture [[id number]]  (Abnormal)  (Susceptibility) Collected: 11/09/23 1400    Order Status: Completed Specimen: Incision site from Chest, Left Updated: 11/13/23 1551     Aerobic Bacterial Culture STAPHYLOCOCCUS EPIDERMIDIS  Rare      Narrative:      Deep pocket #2    Culture, Anaerobe [[id number]] Collected: 11/09/23 1430    Order Status: Completed Specimen: Incision site from Chest, Left Updated: 11/13/23 1350     Anaerobic Culture No anaerobes isolated    Narrative:      Lead tip    Culture, Anaerobe [[id number]] Collected: 11/09/23 1415    Order Status: Completed Specimen: Incision site from Chest, Left Updated: 11/13/23 1349     Anaerobic Culture No anaerobes isolated    Narrative:      Superior pocket #1    Culture, Anaerobe [[id number]] Collected: 11/09/23 1400    Order Status: Completed Specimen: Incision site from Chest, Left Updated: 11/13/23 1349     Anaerobic Culture No anaerobes isolated    Narrative:      Deep pocket #2    Culture, Anaerobe [[id number]] Collected: 11/09/23 1400    Order Status: Completed Specimen: Incision site from Chest, Left Updated: 11/13/23 1348     Anaerobic Culture No anaerobes isolated    Narrative:      Deep pocket #1    Culture, Anaerobe [[id number]] Collected: 11/09/23 1400    Order Status: Completed Specimen: Incision site from Chest, Left Updated: 11/13/23 1347     Anaerobic Culture No anaerobes isolated    Narrative:      Superficial pocket #2    Culture, Anaerobe [[id number]] Collected: 11/09/23 1400    Order Status: Completed Specimen: Incision site from Chest, Left Updated: 11/13/23 1346     Anaerobic Culture No anaerobes isolated    Narrative:      Superficial  pocket #1    Aerobic culture [[id number]]  (Abnormal)  (Susceptibility) Collected: 11/09/23 1400    Order Status: Completed Specimen: Incision site from Chest, Left Updated: 11/13/23 0950     Aerobic Bacterial Culture STAPHYLOCOCCUS AUREUS  Rare      Narrative:      Superficial pocket #1    Aerobic culture [[id number]]  (Abnormal)  (Susceptibility) Collected: 11/09/23 1415    Order Status: Completed Specimen: Incision site from Chest, Left Updated: 11/13/23 0949     Aerobic Bacterial Culture STAPHYLOCOCCUS AUREUS  Rare  Skin ramesh also present      Narrative:      Superior pocket #1    Aerobic culture [[id number]]  (Abnormal)  (Susceptibility) Collected: 11/09/23 1400    Order Status: Completed Specimen: Incision site from Chest, Left Updated: 11/13/23 0945     Aerobic Bacterial Culture STAPHYLOCOCCUS AUREUS  Rare        STAPHYLOCOCCUS EPIDERMIDIS  Rare      Narrative:      Deep pocket #1    Aerobic culture [[id number]]  (Abnormal)  (Susceptibility) Collected: 11/09/23 1400    Order Status: Completed Specimen: Incision site from Chest, Left Updated: 11/13/23 0944     Aerobic Bacterial Culture STAPHYLOCOCCUS AUREUS  Rare  Skin ramesh also present      Narrative:      Superficial pocket #2    Aerobic culture [[id number]] Collected: 11/09/23 1430    Order Status: Completed Specimen: Incision site from Chest, Left Updated: 11/13/23 0840     Aerobic Bacterial Culture No growth    Narrative:      Lead tip            I have reviewed all pertinent labs within the past 24 hours.    Estimated Creatinine Clearance: 99.8 mL/min (based on SCr of 1.2 mg/dL).    Diagnostic Results:  I have reviewed and interpreted all pertinent imaging results/findings within the past 24 hours.  Assessment and Plan:     Kevan Queen is a 38 y.o. male on LVAD presenting with c/o defibrillator coming out of his chest. He stated that this started a few months ago. He also c/o that his picc line looks longer that the previous ones he has had. The picc  line is functioning and there is no irritation around it. He uses it daily for his milrinone infusion. He states that his defibrillator has been coming out of his chest for around a week now. He denied chest trauma, fever, nausea, vomiting or diarrhea. Breathing is at baseline and comfortable at rest. Denies orthopnea or PND or LVAD alarms.        * ICD (implantable cardioverter-defibrillator) malfunction  -Patient presented with [employer] AICD falling out of L chest wall. Apparently had been this way for 1 week  -Unclear reason for dehiscence of ICD site  -BCx's NGTD.  Continue Vanc  -s/p device extraction complicated by hematoma so wound vac removed. Hemostasis achieved with general surgery help.  -Wound care following.   -Orders for BID wet to dry dressing   -Lifevest ordered     Seizure-like activity  Continue keppra    LVAD (left ventricular assist device) present  -HeartMate 3 Implanted on 6/29/2022 as DT with RV failure on milrinone at 0.25 mcg/kg/min.  -Restart Coumadin, Goal INR 2.0-3.0. subtherapeutic today   -LDH is stable.  Will continue to monitor daily  -Speed set at 5100, LSL 4700 rpm  Review of device function is stable/unstable stable        11/13/2023    11:43 AM 11/13/2023     7:10 AM 11/13/2023     3:54 AM 11/12/2023    11:19 PM 11/12/2023     7:55 PM 11/12/2023     4:08 PM 11/12/2023    12:11 PM   TXP LVAD INTERROGATIONS   Type HeartMate3 HeartMate3 HeartMate3 HeartMate3 HeartMate3 HeartMate3 HeartMate3   Flow 4 4.2 4.2 4.4 4 4.1 4.2   Speed 5100 5100 5100 5100 5100 5100 5100   PI 3.2 4.9 5.1 4 5.8 5.6 5.1   Power (Serna) 3.6 3.5 3.6 3.6 3.5 3.7 3.6   LSL 4700 4700 4700  4700     Pulsatility Intermittent pulse Intermittent pulse Intermittent pulse  Intermittent pulse         Type 2 diabetes mellitus with hyperglycemia  Sliding [place] [id number]  Heart Transplant  [facility]

## 2023-11-14 NOTE — CONSULTS
"[facility] - Cardiology Stepdown  Palliative Medicine  Consult Note    Patient Name: Kevan Queen  MRN: [id number]  Admission Date: 11/8/2023  Hospital Length of Stay: 6 days  Code Status: Full Code   Attending Provider: Dalia Crum MD  Consulting Provider: Esther Moore MD  Primary Care Physician: Rosio Armendariz FNP  Principal Problem:ICD (implantable cardioverter-defibrillator) malfunction    Patient information was obtained from patient and primary team.      Inpatient consult to Palliative Care  Consult performed by: Esther Moore MD  Consult ordered by: Marco Rothman NP        Assessment/Plan:     Palliative Care  Palliative care encounter  38 year old male with LVAD on chronic milrinone and recurrent hospitalizations presents with ICD pocket infection. S/p extraction. Palliative consulted for GOC    DMII/seizure disorder/HFrEF/hx CVA/ ICD pocket infection   -management per primary and other consultants     Code status: Full     HCPOA: Patient previously appointed his mother as HCPOA. After our discussion patient decided he wanted to appoint his significant other Darlyn (Page) Kyle [phone] as first choice HCPOA. New document was discussed and completed     GOC/ACP   -Met with patient at bedside. Discussed HCPOA as above. Patient reports having a good quality of life between hospitalizations. He says the LVAD has allowed him to do things he otherwise would not be able to do. He does not find the recurrent hospitalizations burdensome. He says he has had discussions with his significant other following the hospitalization where he was "in a coma for 3 days on the vent" and significant other knows he would not want to be prolonged on machines.     19 minutes spent discussing ACP     -Patient would not want to be prolonged on a vent indefinitely. Patient is accepting of all aggressive measures up until that point     Discussed with LUCÍA Cain               Thank you for your consult. I " "will sign off. Please contact us if you have any additional questions.    Subjective:     HPI:   Per cardiology H&P  "Kevan Queen is a 38 y.o. male on LVAD presenting with c/o defibrillator coming out of his chest. He stated that this started a few months ago. He also c/o that his picc line looks longer that the previous ones he has had. The picc line is functioning and there is no irritation around it. He uses it daily for his milrinone infusion. He states that his defibrillator has been coming out of his chest for around a week now. He denied chest trauma, fever, nausea, vomiting or diarrhea. Breathing is at baseline and comfortable at rest. Denies orthopnea or PND or LVAD alarms."     Now s/p ICD extraction. Patient is on chronic milrinone. Palliative consulted for GOC. Patient with no complaints. Patient with hx of CVA a couple months ago. Recurrent admissions     Hospital Course:  No notes on file        Past Medical History:   Diagnosis Date    Acute respiratory failure with hypoxia 7/22/2023    Arthritis     Awareness alteration, transient 9/1/2022    Cardiomyopathy     CHF (congestive heart failure) 10/01/2020    COVID-19 6/3/2023    Diabetes mellitus     Dilated cardiomyopathy 10/26/2020    Drug abuse 10/2020    Headache 4/19/2023    Hyperglycemia 12/16/2022    Hyperosmolar hyperglycemic state (HHS) 5/25/2022    ICD (implantable cardioverter-defibrillator) in place 10/26/2020    Left ventricular assist device (LVAD) complication, initial encounter 6/5/2023    Muscle cramping 6/15/2022    Renal disorder     SOB (shortness of breath) 6/13/2022    Tingling in extremities 7/13/2022       Past Surgical History:   Procedure Laterality Date    APPLICATION OF WOUND VACUUM-ASSISTED CLOSURE DEVICE N/A 6/30/2022    Procedure: APPLICATION, WOUND VAC;  Surgeon: Luis F Paige MD;  Location: [facility] OR [address] [place] [address];  Service: Cardiovascular;  Laterality: N/A;  50 x 5 cm    CARDIAC DEFIBRILLATOR PLACEMENT      " ECHOCARDIOGRAM,TRANSESOPHAGEAL  11/9/2023    Procedure: Transesophageal echo (GUILLERMO) intra-procedure log documentation;  Surgeon: Eron Ivy MD;  Location: [facility] EP LAB;  Service: Cardiology;;    EXTRACTION, ELECTRODE LEAD Left 11/9/2023    Procedure: EXTRACTION, ELECTRODE LEAD;  Surgeon: ADALID Leon MD;  Location: [facility] EP LAB;  Service: Cardiology;  Laterality: Left;  INFECTION, LEAD EXTRACTION, GUILLERMO, BSCI, ANES, EH,   *NO CTS BACKUP*    IMPLANTATION OF RIGHT VENTRICULAR ASSIST DEVICE (RVAD) N/A 6/29/2022    Procedure: INSERTION, RVAD;  Surgeon: Luis F Paige MD;  Location: [facility] OR 2ND FLR;  Service: Cardiovascular;  Laterality: N/A;    INSERTION OF GRAFT TO PERICARDIUM Right 6/30/2022    Procedure: INSERTION-RIGHT VENTRICULAR ASSIST DEVICE;  Surgeon: Luis F Paige MD;  Location: [facility] OR [facility] FLR;  Service: Cardiovascular;  Laterality: Right;    IRRIGATION OF MEDIASTINUM  6/30/2022    Procedure: IRRIGATION, MEDIASTINUM;  Surgeon: Luis F Paige MD;  Location: [facility] OR 2ND FLR;  Service: Cardiovascular;;    LEFT VENTRICULAR ASSIST DEVICE Left 6/23/2022    Procedure: INSERTION-LEFT VENTRICULAR ASSIST DEVICE;  Surgeon: Luis F Paige MD;  Location: [facility] OR 2ND FLR;  Service: Cardiovascular;  Laterality: Left;    LEFT VENTRICULAR ASSIST DEVICE N/A 6/29/2022    Procedure: INSERTION-LEFT VENTRICULAR ASSIST DEVICE;  Surgeon: Luis F Paige MD;  Location: [facility] OR 2ND FLR;  Service: Cardiovascular;  Laterality: N/A;    REVISION OF SKIN POCKET FOR CARDIOVERTER-DEFIBRILLATOR  11/9/2023    Procedure: REVISION, SKIN POCKET, FOR CARDIOVERTER-DEFIBRILLATOR;  Surgeon: ADALID Leon MD;  Location: [facility] EP LAB;  Service: Cardiology;;    RIGHT HEART CATHETERIZATION Right 4/8/2022    Procedure: INSERTION, CATHETER, RIGHT HEART;  Surgeon: Luca Lopez Jr., MD;  Location: [facility] CATH LAB;  Service: Cardiology;  Laterality: Right;    RIGHT HEART CATHETERIZATION Right 4/19/2022    Procedure: INSERTION,  CATHETER, RIGHT HEART;  Surgeon: Josh Pulido MD;  Location: [facility] CATH LAB;  Service: Cardiology;  Laterality: Right;    RIGHT HEART CATHETERIZATION Right 7/21/2022    Procedure: INSERTION, CATHETER, RIGHT HEART;  Surgeon: Dalia Crum MD;  Location: [facility] CATH LAB;  Service: Cardiology;  Laterality: Right;    RIGHT HEART CATHETERIZATION Right 10/31/2022    Procedure: INSERTION, CATHETER, RIGHT HEART;  Surgeon: Dalia Crum MD;  Location: [facility] CATH LAB;  Service: Cardiology;  Laterality: Right;    STERNAL WOUND CLOSURE N/A 6/30/2022    Procedure: CLOSURE, WOUND, STERNUM;  Surgeon: Luis F Paige MD;  Location: [facility] OR [address] [place] [address];  Service: Cardiovascular;  Laterality: N/A;       Review of patient's allergies indicates:   Allergen Reactions    Bumex [bumetanide] Hives    Torsemide Hives       Medications:  Continuous Infusions:   sodium chloride 0.9% Stopped (11/11/23 1021)    milrinone 20mg/100ml D5W (200mcg/ml) 0.25 mcg/kg/min (11/14/23 0522)     Scheduled Meds:   aspirin  81 mg Oral Daily    atorvastatin  40 mg Oral Daily    DAPTOmycin (CUBICIN) IV (PEDS and ADULTS)  8 mg/kg Intravenous Q24H    furosemide  80 mg Oral Daily    insulin aspart U-100  10 Units Subcutaneous QID (WM & HS)    insulin detemir U-100  18 Units Subcutaneous BID    levETIRAcetam  1,000 mg Oral BID    magnesium oxide  400 mg Oral BID    methocarbamoL  1,000 mg Oral QID    mirtazapine  15 mg Oral Nightly    pantoprazole  40 mg Oral Daily    polyethylene glycol  17 g Oral Daily    senna-docusate 8.6-50 mg  2 tablet Oral BID    sodium chloride 0.9%  10 mL Intravenous Q6H    spironolactone  25 mg Oral Daily    warfarin  7.5 mg Oral Daily     PRN Meds:acetaminophen, dextrose 10%, dextrose 10%, dextrose 10%, dextrose 10%, dextrose, dextrose, glucagon (human recombinant), glucose, influenza, insulin aspart U-100, sodium chloride 0.9%, Flushing PICC/Midline Protocol **AND** sodium chloride 0.9% **AND** sodium chloride 0.9%    Family  History       Problem Relation (Age of Onset)    Diverticulosis Brother    Heart attack Maternal Grandmother, Maternal Grandfather    Heart failure Father          Tobacco Use    Smoking status: Former     Current packs/day: 0.00     Average packs/day: 0.5 packs/day for 16.6 years (8.3 ttl pk-yrs)     Types: Cigarettes     Start date: 10/1/2004     Quit date: 2021     Years since quittin.5    Smokeless tobacco: Never   Substance and Sexual Activity    Alcohol use: Not Currently    Drug use: Not Currently     Types: Marijuana, MDMA (Ecstacy)    Sexual activity: Yes     Partners: Female     Birth control/protection: None       Review of Systems   Constitutional:  Positive for activity change and fatigue.   HENT: Negative.     Eyes: Negative.    Respiratory: Negative.     Cardiovascular: Negative.    Gastrointestinal: Negative.    Musculoskeletal: Negative.    Skin: Negative.    Allergic/Immunologic: Negative.    Neurological: Negative.    Psychiatric/Behavioral: Negative.       Objective:     Vital Signs (Most Recent):  Temp: 98 °F (36.7 °C) (23 1627)  Pulse: 103 (23 1511)  Resp: 18 (23 1200)  BP: (!) 78/0 (23 1200)  SpO2: 98 % (23 1200) Vital Signs (24h Range):  Temp:  [97.9 °F (36.6 °C)-98.9 °F (37.2 °C)] 98 °F (36.7 °C)  Pulse:  [103-124] 103  Resp:  [18] 18  SpO2:  [98 %-100 %] 98 %  BP: (78-86)/(0) 78/0     Weight: 84.9 kg (187 lb 2.7 oz)  Body mass index is 23.39 kg/m².       Physical Exam  Vitals and nursing note reviewed.   Constitutional:       General: He is not in acute distress.  HENT:      Head: Normocephalic.      Right Ear: External ear normal.      Left Ear: External ear normal.      Nose: Nose normal.      Mouth/Throat:      Mouth: Mucous membranes are moist.   Eyes:      Pupils: Pupils are equal, round, and reactive to light.   Cardiovascular:      Rate and Rhythm: Tachycardia present.   Pulmonary:      Effort: Pulmonary effort is normal. No respiratory  distress.   Abdominal:      General: There is no distension.   Musculoskeletal:      Cervical back: Normal range of motion.   Neurological:      Mental Status: He is alert and oriented to person, place, and time.   Psychiatric:         Mood and Affect: Mood normal.         Thought Content: Thought content normal.            Review of Symptoms      Symptom Assessment (ESAS 0-10 Scale)  Pain:  0  Dyspnea:  0  Anxiety:  0  Nausea:  0  Depression:  0  Anorexia:  0  Fatigue:  0  Insomnia:  0  Restlessness:  0  Agitation:  0     CAM / Delirium:  Negative  Constipation:  Negative  Diarrhea:  Negative      Bowel Management Plan (BMP):  Yes      Modified Ria Scale:  0    Performance Status:  70    Living Arrangements:  Lives with family    Psychosocial/Cultural:   See Palliative Psychosocial Note: No  Has a significant other. Mother still living. Has 7 children, different mothers   **Primary  to Follow**  Palliative Care  Consult: No    Spiritual:  F - Margie and Belief:  Not addressed   A - Address in Care:  Not addressed         Advance Care Planning  Advance Directives:   Living Will: No    [place]: No    Do Not Resuscitate Status: No    Medical Power of : Yes    Agent's Name:  Darlyn Wright   Agent's Contact Number:  [phone]    Decision Making:  Patient answered questions  Goals of Care: The patient endorses that what is most important right now is to focus on quality of life, even if it means sacrificing a little time    Accordingly, we have decided that the best plan to meet the patient's goals includes continuing with treatment         Significant Labs: All pertinent labs within the past 24 hours have been reviewed.  CBC:   Recent Labs   Lab 11/14/23 0438   WBC 10.27   HGB 7.9*   HCT 25.1*   MCV 76*        BMP:  Recent Labs   Lab 11/14/23 0438   *      K 4.3      CO2 28   BUN 12   CREATININE 1.2   CALCIUM 9.2   MG 2.0     LFT:  Lab Results    Component Value Date    AST 25 11/14/2023    ALKPHOS 131 11/14/2023    BILITOT 0.8 11/14/2023     Albumin:   Albumin   Date Value Ref Range Status   11/14/2023 2.9 (L) 3.5 - 5.2 g/dL Final     Protein:   Total Protein   Date Value Ref Range Status   11/14/2023 7.0 6.0 - 8.4 g/dL Final     Lactic acid:   Lab Results   Component Value Date    LACTATE 1.8 07/22/2023    LACTATE 2.2 06/03/2023       Significant Imaging: I have reviewed all pertinent imaging results/findings within the past 24 hours.      Chest x-ray 11/9/23    The cardiomediastinal silhouette is prominent, stable.  LVAD noted.  There is improving left basilar atelectasis/pleural fluid.  Left ICD has been removed.  There is no new or enlarging pneumothorax or significant interval detrimental change in the cardiopulmonary status since the previous exam.           Esther Moore MD  Palliative Medicine  [facility] - Cardiology Stepdown

## 2023-11-14 NOTE — ASSESSMENT & PLAN NOTE
"38 year old male with LVAD on chronic milrinone and recurrent hospitalizations presents with ICD pocket infection. S/p extraction. Palliative consulted for GOC    DMII/seizure disorder/HFrEF/hx CVA/ ICD pocket infection   -management per primary and other consultants     Code status: Full     HCPOA: Patient previously appointed his mother as HCPOA. After our discussion patient decided he wanted to appoint his significant other Darlyn (Page) Kyle [phone] as first choice HCPOA. New document was discussed and completed     GOC/ACP   -Met with patient at bedside. Discussed HCPOA as above. Patient reports having a good quality of life between hospitalizations. He says the LVAD has allowed him to do things he otherwise would not be able to do. He does not find the recurrent hospitalizations burdensome. He says he has had discussions with his significant other following the hospitalization where he was "in a coma for 3 days on the vent" and significant other knows he would not want to be prolonged on machines.     19 minutes spent discussing ACP     -Patient would not want to be prolonged on a vent indefinitely. Patient is accepting of all aggressive measures up until that point     Discussed with LUCÍA Cain         "

## 2023-11-14 NOTE — PROGRESS NOTES
11/14/23 1423        VAD 06/29/22 1115 Left ventricular assist device HeartMate 3   Placement Date/Time: 06/29/22 1115   Present Prior to Hospital Arrival?: No  Inserted by: MD  VAD Type: Left ventricular assist device  VAD Brand: HeartMate 3   Site Location Abdomen right   Site Assessment Clean;Dry;Intact   Driveline Exit Site 1   Driveline Assessment Free of Kinks;Intact;Modular cable Clean and Locked   Dressing Status Clean;Dry;Intact   Dressing Intervention Sterile dressing change   Performed By RN   Dressing Change Schedule Daily   Dressing Change Due 11/15/23   Driveline Leon in use [place] askew   Condition CDI     LVAD dressing changed, completed with kit, using sterile technique, no redness or tenderness noted.

## 2023-11-14 NOTE — SUBJECTIVE & OBJECTIVE
Interval History: Patient reports that he is willing and able to learn to do bid wet to dry dressing changes to ICD system explant site and so is his wife Robyn. He would like to be discharged tomorrow if possible. Coumadin resumed last night, INR 1.1 but he is not a candidate for Heparin bridge given post op hematoma from ICD explant site. Life Vest has been ordered    Continuous Infusions:   sodium chloride 0.9% Stopped (11/11/23 1021)    milrinone 20mg/100ml D5W (200mcg/ml) 0.25 mcg/kg/min (11/14/23 0522)     Scheduled Meds:   aspirin  81 mg Oral Daily    atorvastatin  40 mg Oral Daily    DAPTOmycin (CUBICIN) IV (PEDS and ADULTS)  8 mg/kg Intravenous Q24H    furosemide  80 mg Oral Daily    insulin aspart U-100  10 Units Subcutaneous QID (WM & HS)    insulin detemir U-100  18 Units Subcutaneous BID    levETIRAcetam  1,000 mg Oral BID    magnesium oxide  400 mg Oral BID    methocarbamoL  1,000 mg Oral QID    mirtazapine  15 mg Oral Nightly    pantoprazole  40 mg Oral Daily    polyethylene glycol  17 g Oral Daily    senna-docusate 8.6-50 mg  2 tablet Oral BID    sodium chloride 0.9%  10 mL Intravenous Q6H    spironolactone  25 mg Oral Daily    warfarin  7.5 mg Oral Daily     PRN Meds:acetaminophen, dextrose 10%, dextrose 10%, dextrose 10%, dextrose 10%, dextrose, dextrose, glucagon (human recombinant), glucose, influenza, insulin aspart U-100, sodium chloride 0.9%, Flushing PICC/Midline Protocol **AND** sodium chloride 0.9% **AND** sodium chloride 0.9%    Review of patient's allergies indicates:   Allergen Reactions    Bumex [bumetanide] Hives    Torsemide Hives     Objective:     Vital Signs (Most Recent):  Temp: 98.3 °F (36.8 °C) (11/14/23 1046)  Pulse: (!) 124 (11/14/23 1200)  Resp: 18 (11/14/23 1200)  BP: (!) 78/0 (11/14/23 1200)  SpO2: 100 % (11/14/23 1046) Vital Signs (24h Range):  Temp:  [97.9 °F (36.6 °C)-98.9 °F (37.2 °C)] 98.3 °F (36.8 °C)  Pulse:  [] 124  Resp:  [18] 18  SpO2:  [98 %-100 %] 100  %  BP: (78-86)/(0) 78/0     Patient Vitals for the past 72 hrs (Last 3 readings):   Weight   11/14/23 0414 84.9 kg (187 lb 2.7 oz)   11/12/23 0438 86 kg (189 lb 9.5 oz)     Body mass index is 23.39 kg/m².      Intake/Output Summary (Last 24 hours) at 11/14/2023 1252  Last data filed at 11/14/2023 1206  Gross per 24 hour   Intake 1170 ml   Output 2525 ml   Net -1355 ml       Hemodynamic Parameters:  CVP:  [5 mmHg] 5 mmHg    Telemetry: ST       Physical Exam  Constitutional:       Appearance: Normal appearance.   HENT:      Head: Normocephalic and atraumatic.   Eyes:      Conjunctiva/sclera: Conjunctivae normal.      Pupils: Pupils are equal, round, and reactive to light.   Neck:      Comments: Do not appreciate elevated JVP  Pulmonary:      Effort: Pulmonary effort is normal.      Breath sounds: Normal breath sounds.   Abdominal:      General: Bowel sounds are normal.      Palpations: Abdomen is soft.   Musculoskeletal:         General: No swelling. Normal range of motion.      Cervical back: Normal range of motion and neck supple.   Skin:     General: Skin is warm and dry.      Capillary Refill: Capillary refill takes 2 to 3 seconds.   Neurological:      General: No focal deficit present.      Mental Status: He is alert and oriented to person, place, and time.   Psychiatric:         Mood and Affect: Mood normal.         Behavior: Behavior normal.         Thought Content: Thought content normal.         Judgment: Judgment normal.            Significant Labs:  CBC:  Recent Labs   Lab 11/12/23  0510 11/12/23  1303 11/13/23  0407 11/14/23  0438   WBC 11.45  --  12.26 10.27   RBC 3.32*  --  3.42* 3.29*   HGB 7.9*  7.8* 8.1* 8.1* 7.9*   HCT 25.2*  --  26.0* 25.1*     --  338 346   MCV 76*  --  76* 76*   MCH 23.8*  --  23.7* 24.0*   MCHC 31.3*  --  31.2* 31.5*     BNP:  Recent Labs   Lab 11/08/23  1407 11/13/23  0407   * 116*     CMP:  Recent Labs   Lab 11/12/23  0509 11/12/23  2052 11/13/23  040  11/14/23  0438   * 192* 175* 157*   CALCIUM 8.9 8.9 9.1 9.2   ALBUMIN 2.7*  --  2.9*  2.9* 2.9*   PROT 6.6  --  7.1  7.1 7.0    135* 136 136   K 3.6 4.1 4.3 4.3   CO2 26 30* 28 28    97 98 100   BUN 12 14 13 12   CREATININE 1.3 1.4 1.2 1.2   ALKPHOS 112  --  131  131 131   ALT 8*  --  15  15 17   AST 19  --  25  25 25   BILITOT 0.9  --  0.8  0.8 0.8      Coagulation:   Recent Labs   Lab 11/12/23  0510 11/13/23  0407 11/14/23  0438   INR 1.2 1.1 1.1   APTT 23.9 22.7 24.2     LDH:  Recent Labs   Lab 11/12/23  0509 11/13/23  0407 11/14/23  0438    261* 236     Microbiology:  Microbiology Results (last 7 days)       Procedure Component Value Units Date/Time    Blood culture [[id number]] Collected: 11/10/23 0400    Order Status: Completed Specimen: Blood from Peripheral, Wrist, Left Updated: 11/14/23 0612     Blood Culture, Routine No Growth to date      No Growth to date      No Growth to date      No Growth to date      No Growth to date    Blood culture [[id number]] Collected: 11/10/23 0403    Order Status: Completed Specimen: Blood from Peripheral, Wrist, Right Updated: 11/14/23 0612     Blood Culture, Routine No Growth to date      No Growth to date      No Growth to date      No Growth to date      No Growth to date    Blood culture x two cultures. Draw prior to antibiotics. [[id number]] Collected: 11/08/23 1407    Order Status: Completed Specimen: Blood from Peripheral, Lower Arm, Left Updated: 11/13/23 1612     Blood Culture, Routine No growth after 5 days.    Narrative:      Aerobic and anaerobic    Blood culture x two cultures. Draw prior to antibiotics. [[id number]] Collected: 11/08/23 1408    Order Status: Completed Specimen: Blood from Peripheral, Antecubital, Left Updated: 11/13/23 1612     Blood Culture, Routine No growth after 5 days.    Narrative:      Aerobic and anaerobic    Aerobic culture [[id number]]  (Abnormal)  (Susceptibility) Collected: 11/09/23 1400    Order  Status: Completed Specimen: Incision site from Chest, Left Updated: 11/13/23 1551     Aerobic Bacterial Culture STAPHYLOCOCCUS EPIDERMIDIS  Rare      Narrative:      Deep pocket #2    Culture, Anaerobe [[id number]] Collected: 11/09/23 1430    Order Status: Completed Specimen: Incision site from Chest, Left Updated: 11/13/23 1350     Anaerobic Culture No anaerobes isolated    Narrative:      Lead tip    Culture, Anaerobe [[id number]] Collected: 11/09/23 1415    Order Status: Completed Specimen: Incision site from Chest, Left Updated: 11/13/23 1349     Anaerobic Culture No anaerobes isolated    Narrative:      Superior pocket #1    Culture, Anaerobe [[id number]] Collected: 11/09/23 1400    Order Status: Completed Specimen: Incision site from Chest, Left Updated: 11/13/23 1349     Anaerobic Culture No anaerobes isolated    Narrative:      Deep pocket #2    Culture, Anaerobe [[id number]] Collected: 11/09/23 1400    Order Status: Completed Specimen: Incision site from Chest, Left Updated: 11/13/23 1348     Anaerobic Culture No anaerobes isolated    Narrative:      Deep pocket #1    Culture, Anaerobe [[id number]] Collected: 11/09/23 1400    Order Status: Completed Specimen: Incision site from Chest, Left Updated: 11/13/23 1347     Anaerobic Culture No anaerobes isolated    Narrative:      Superficial pocket #2    Culture, Anaerobe [[id number]] Collected: 11/09/23 1400    Order Status: Completed Specimen: Incision site from Chest, Left Updated: 11/13/23 1346     Anaerobic Culture No anaerobes isolated    Narrative:      Superficial pocket #1    Aerobic culture [[id number]]  (Abnormal)  (Susceptibility) Collected: 11/09/23 1400    Order Status: Completed Specimen: Incision site from Chest, Left Updated: 11/13/23 0950     Aerobic Bacterial Culture STAPHYLOCOCCUS AUREUS  Rare      Narrative:      Superficial pocket #1    Aerobic culture [[id number]]  (Abnormal)  (Susceptibility) Collected: 11/09/23 1415    Order Status:  Completed Specimen: Incision site from Chest, Left Updated: 11/13/23 0949     Aerobic Bacterial Culture STAPHYLOCOCCUS AUREUS  Rare  Skin ramesh also present      Narrative:      Superior pocket #1    Aerobic culture [[id number]]  (Abnormal)  (Susceptibility) Collected: 11/09/23 1400    Order Status: Completed Specimen: Incision site from Chest, Left Updated: 11/13/23 0945     Aerobic Bacterial Culture STAPHYLOCOCCUS AUREUS  Rare        STAPHYLOCOCCUS EPIDERMIDIS  Rare      Narrative:      Deep pocket #1    Aerobic culture [[id number]]  (Abnormal)  (Susceptibility) Collected: 11/09/23 1400    Order Status: Completed Specimen: Incision site from Chest, Left Updated: 11/13/23 0944     Aerobic Bacterial Culture STAPHYLOCOCCUS AUREUS  Rare  Skin ramesh also present      Narrative:      Superficial pocket #2    Aerobic culture [[id number]] Collected: 11/09/23 1430    Order Status: Completed Specimen: Incision site from Chest, Left Updated: 11/13/23 0840     Aerobic Bacterial Culture No growth    Narrative:      Lead tip            I have reviewed all pertinent labs within the past 24 hours.    Estimated Creatinine Clearance: 99.8 mL/min (based on SCr of 1.2 mg/dL).    Diagnostic Results:  I have reviewed and interpreted all pertinent imaging results/findings within the past 24 hours.

## 2023-11-14 NOTE — SUBJECTIVE & OBJECTIVE
Past Medical History:   Diagnosis Date    Acute respiratory failure with hypoxia 7/22/2023    Arthritis     Awareness alteration, transient 9/1/2022    Cardiomyopathy     CHF (congestive heart failure) 10/01/2020    COVID-19 6/3/2023    Diabetes mellitus     Dilated cardiomyopathy 10/26/2020    Drug abuse 10/2020    Headache 4/19/2023    Hyperglycemia 12/16/2022    Hyperosmolar hyperglycemic state (HHS) 5/25/2022    ICD (implantable cardioverter-defibrillator) in place 10/26/2020    Left ventricular assist device (LVAD) complication, initial encounter 6/5/2023    Muscle cramping 6/15/2022    Renal disorder     SOB (shortness of breath) 6/13/2022    Tingling in extremities 7/13/2022       Past Surgical History:   Procedure Laterality Date    APPLICATION OF WOUND VACUUM-ASSISTED CLOSURE DEVICE N/A 6/30/2022    Procedure: APPLICATION, WOUND VAC;  Surgeon: Luis F Paige MD;  Location: [facility] OR [address] [place] [address];  Service: Cardiovascular;  Laterality: N/A;  50 x 5 cm    CARDIAC DEFIBRILLATOR PLACEMENT      ECHOCARDIOGRAM,TRANSESOPHAGEAL  11/9/2023    Procedure: Transesophageal echo (GUILLERMO) intra-procedure log documentation;  Surgeon: Eron Ivy MD;  Location: [facility] EP LAB;  Service: Cardiology;;    EXTRACTION, ELECTRODE LEAD Left 11/9/2023    Procedure: EXTRACTION, ELECTRODE LEAD;  Surgeon: ADALID Leon MD;  Location: [facility] EP LAB;  Service: Cardiology;  Laterality: Left;  INFECTION, LEAD EXTRACTION, GUILLERMO, BSCI, ANES, EH,   *NO CTS BACKUP*    IMPLANTATION OF RIGHT VENTRICULAR ASSIST DEVICE (RVAD) N/A 6/29/2022    Procedure: INSERTION, RVAD;  Surgeon: Luis F Paige MD;  Location: [facility] OR [address] [place] [address];  Service: Cardiovascular;  Laterality: N/A;    INSERTION OF GRAFT TO PERICARDIUM Right 6/30/2022    Procedure: INSERTION-RIGHT VENTRICULAR ASSIST DEVICE;  Surgeon: Luis F Paige MD;  Location: [address];  Service: Cardiovascular;  Laterality: Right;    IRRIGATION OF MEDIASTINUM  6/30/2022    Procedure:  IRRIGATION, MEDIASTINUM;  Surgeon: Luis F Paige MD;  Location: [facility] OR [facility] FLR;  Service: Cardiovascular;;    LEFT VENTRICULAR ASSIST DEVICE Left 6/23/2022    Procedure: INSERTION-LEFT VENTRICULAR ASSIST DEVICE;  Surgeon: Luis F Paige MD;  Location: [facility] OR [facility] FLR;  Service: Cardiovascular;  Laterality: Left;    LEFT VENTRICULAR ASSIST DEVICE N/A 6/29/2022    Procedure: INSERTION-LEFT VENTRICULAR ASSIST DEVICE;  Surgeon: Luis F Paige MD;  Location: [facility] OR [facility] FLR;  Service: Cardiovascular;  Laterality: N/A;    REVISION OF SKIN POCKET FOR CARDIOVERTER-DEFIBRILLATOR  11/9/2023    Procedure: REVISION, SKIN POCKET, FOR CARDIOVERTER-DEFIBRILLATOR;  Surgeon: ADALID Leon MD;  Location: [facility] EP LAB;  Service: Cardiology;;    RIGHT HEART CATHETERIZATION Right 4/8/2022    Procedure: INSERTION, CATHETER, RIGHT HEART;  Surgeon: Luca Lopez Jr., MD;  Location: [facility] CATH LAB;  Service: Cardiology;  Laterality: Right;    RIGHT HEART CATHETERIZATION Right 4/19/2022    Procedure: INSERTION, CATHETER, RIGHT HEART;  Surgeon: Josh Pulido MD;  Location: [facility] CATH LAB;  Service: Cardiology;  Laterality: Right;    RIGHT HEART CATHETERIZATION Right 7/21/2022    Procedure: INSERTION, CATHETER, RIGHT HEART;  Surgeon: Dalia Crum MD;  Location: [facility] CATH LAB;  Service: Cardiology;  Laterality: Right;    RIGHT HEART CATHETERIZATION Right 10/31/2022    Procedure: INSERTION, CATHETER, RIGHT HEART;  Surgeon: Dalia Crum MD;  Location: [facility] CATH LAB;  Service: Cardiology;  Laterality: Right;    STERNAL WOUND CLOSURE N/A 6/30/2022    Procedure: CLOSURE, WOUND, STERNUM;  Surgeon: Luis F Paige MD;  Location: [facility] OR [facility] FLR;  Service: Cardiovascular;  Laterality: N/A;       Review of patient's allergies indicates:   Allergen Reactions    Bumex [bumetanide] Hives    Torsemide Hives       Medications:  Continuous Infusions:   sodium chloride 0.9% Stopped (11/11/23 1021)    milrinone 20mg/100ml D5W  (200mcg/ml) 0.25 mcg/kg/min (23 0522)     Scheduled Meds:   aspirin  81 mg Oral Daily    atorvastatin  40 mg Oral Daily    DAPTOmycin (CUBICIN) IV (PEDS and ADULTS)  8 mg/kg Intravenous Q24H    furosemide  80 mg Oral Daily    insulin aspart U-100  10 Units Subcutaneous QID (WM & HS)    insulin detemir U-100  18 Units Subcutaneous BID    levETIRAcetam  1,000 mg Oral BID    magnesium oxide  400 mg Oral BID    methocarbamoL  1,000 mg Oral QID    mirtazapine  15 mg Oral Nightly    pantoprazole  40 mg Oral Daily    polyethylene glycol  17 g Oral Daily    senna-docusate 8.6-50 mg  2 tablet Oral BID    sodium chloride 0.9%  10 mL Intravenous Q6H    spironolactone  25 mg Oral Daily    warfarin  7.5 mg Oral Daily     PRN Meds:acetaminophen, dextrose 10%, dextrose 10%, dextrose 10%, dextrose 10%, dextrose, dextrose, glucagon (human recombinant), glucose, influenza, insulin aspart U-100, sodium chloride 0.9%, Flushing PICC/Midline Protocol **AND** sodium chloride 0.9% **AND** sodium chloride 0.9%    Family History       Problem Relation (Age of Onset)    Diverticulosis Brother    Heart attack Maternal Grandmother, Maternal Grandfather    Heart failure Father          Tobacco Use    Smoking status: Former     Current packs/day: 0.00     Average packs/day: 0.5 packs/day for 16.6 years (8.3 ttl pk-yrs)     Types: Cigarettes     Start date: 10/1/2004     Quit date: 2021     Years since quittin.5    Smokeless tobacco: Never   Substance and Sexual Activity    Alcohol use: Not Currently    Drug use: Not Currently     Types: Marijuana, MDMA (Ecstacy)    Sexual activity: Yes     Partners: Female     Birth control/protection: None       Review of Systems   Constitutional:  Positive for activity change and fatigue.   HENT: Negative.     Eyes: Negative.    Respiratory: Negative.     Cardiovascular: Negative.    Gastrointestinal: Negative.    Musculoskeletal: Negative.    Skin: Negative.    Allergic/Immunologic: Negative.     Neurological: Negative.    Psychiatric/Behavioral: Negative.       Objective:     Vital Signs (Most Recent):  Temp: 98 °F (36.7 °C) (11/14/23 1627)  Pulse: 103 (11/14/23 1511)  Resp: 18 (11/14/23 1200)  BP: (!) 78/0 (11/14/23 1200)  SpO2: 98 % (11/14/23 1200) Vital Signs (24h Range):  Temp:  [97.9 °F (36.6 °C)-98.9 °F (37.2 °C)] 98 °F (36.7 °C)  Pulse:  [103-124] 103  Resp:  [18] 18  SpO2:  [98 %-100 %] 98 %  BP: (78-86)/(0) 78/0     Weight: 84.9 kg (187 lb 2.7 oz)  Body mass index is 23.39 kg/m².       Physical Exam  Vitals and nursing note reviewed.   Constitutional:       General: He is not in acute distress.  HENT:      Head: Normocephalic.      Right Ear: External ear normal.      Left Ear: External ear normal.      Nose: Nose normal.      Mouth/Throat:      Mouth: Mucous membranes are moist.   Eyes:      Pupils: Pupils are equal, round, and reactive to light.   Cardiovascular:      Rate and Rhythm: Tachycardia present.   Pulmonary:      Effort: Pulmonary effort is normal. No respiratory distress.   Abdominal:      General: There is no distension.   Musculoskeletal:      Cervical back: Normal range of motion.   Neurological:      Mental Status: He is alert and oriented to person, place, and time.   Psychiatric:         Mood and Affect: Mood normal.         Thought Content: Thought content normal.            Review of Symptoms      Symptom Assessment (ESAS 0-10 Scale)  Pain:  0  Dyspnea:  0  Anxiety:  0  Nausea:  0  Depression:  0  Anorexia:  0  Fatigue:  0  Insomnia:  0  Restlessness:  0  Agitation:  0     CAM / Delirium:  Negative  Constipation:  Negative  Diarrhea:  Negative      Bowel Management Plan (BMP):  Yes      Modified Ria Scale:  0    Performance Status:  70    Living Arrangements:  Lives with family    Psychosocial/Cultural:   See Palliative Psychosocial Note: No  Has a significant other. Mother still living. Has 7 children, different mothers   **Primary  to Follow**  Palliative Care   Consult: No    Spiritual:  F - Margie and Belief:  Not addressed   A - Address in Care:  Not addressed         Advance Care Planning   Advance Directives:   Living Will: No    LaPOST: No    Do Not Resuscitate Status: No    Medical Power of : Yes    Agent's Name:  Darlyn Wright   Agent's Contact Number:  [phone]    Decision Making:  Patient answered questions  Goals of Care: The patient endorses that what is most important right now is to focus on quality of life, even if it means sacrificing a little time    Accordingly, we have decided that the best plan to meet the patient's goals includes continuing with treatment         Significant Labs: All pertinent labs within the past 24 hours have been reviewed.  CBC:   Recent Labs   Lab 11/14/23 0438   WBC 10.27   HGB 7.9*   HCT 25.1*   MCV 76*        BMP:  Recent Labs   Lab 11/14/23 0438   *      K 4.3      CO2 28   BUN 12   CREATININE 1.2   CALCIUM 9.2   MG 2.0     LFT:  Lab Results   Component Value Date    AST 25 11/14/2023    ALKPHOS 131 11/14/2023    BILITOT 0.8 11/14/2023     Albumin:   Albumin   Date Value Ref Range Status   11/14/2023 2.9 (L) 3.5 - 5.2 g/dL Final     Protein:   Total Protein   Date Value Ref Range Status   11/14/2023 7.0 6.0 - 8.4 g/dL Final     Lactic acid:   Lab Results   Component Value Date    LACTATE 1.8 07/22/2023    LACTATE 2.2 06/03/2023       Significant Imaging: I have reviewed all pertinent imaging results/findings within the past 24 hours.      Chest x-ray 11/9/23    The cardiomediastinal silhouette is prominent, stable.  LVAD noted.  There is improving left basilar atelectasis/pleural fluid.  Left ICD has been removed.  There is no new or enlarging pneumothorax or significant interval detrimental change in the cardiopulmonary status since the previous exam.

## 2023-11-14 NOTE — PROGRESS NOTES
"[facility] - Cardiology Stepdown  Adult Nutrition  Progress Note    SUMMARY       Recommendations    1) Continue cardiac diet w/ fluid per MD - add double portions   2) RD Following     Goals: Meet % een/epn by next RD f/u  Nutrition Goal Status: progressing towards goal  Communication of RD Recs: other (comment) (POC)    Assessment and Plan    Nutrition Problem  Inadequate energy intake     Related to (etiology):   Inadequate oral intake     Signs and Symptoms (as evidenced by):   NPO      Interventions/Recommendations (treatment strategy):  Collaboration with other providers     Nutrition Diagnosis Status:   Continues     Reason for Assessment    Reason For Assessment: RD follow-up  Diagnosis: cardiac disease  Relevant Medical History: LVAD 2023,CHF, DMT2, drug abuse  Interdisciplinary Rounds: did not attend    General Information Comments: RD f/u, pt endorses good appetite w/ 100% intake of meals. Asked for double portions. Denies N/V/D/C or chewing/swallowing issues. Good intake/appetite PTA. # Per charted wt history pt with wt flux since August, possibly d/t fluid status. Appears nourished. No s/s of malnutrition at this time. RD following      Nutrition Discharge Planning: adequate intake    Nutrition Risk Screen    Nutrition Risk Screen: no indicators present    Nutrition/Diet History    Patient Reported Diet/Restrictions/Preferences: general  Typical Food/Fluid Intake: 2-3 meals/day  Spiritual, Cultural Beliefs, Yazdanism Practices, Values that Affect Care: no  Food Allergies: NKFA    Anthropometrics    Temp: 98.3 °F (36.8 °C)  Height Method: Stated  Height: 6' 3" (190.5 cm)  Height (inches): 75 in  Weight Method: Standard Scale  Weight: 84.9 kg (187 lb 2.7 oz)  Weight (lb): 187.17 lb  Ideal Body Weight (IBW), Male: 196 lb  % Ideal Body Weight, Male (lb): 98.98 %  BMI (Calculated): 23.4  BMI Grade: 18.5-24.9 - normal  Usual Body Weight (UBW), k.45 kg  % Usual Body Weight: 89.13   "     Lab/Procedures/Meds    Pertinent Labs Reviewed: reviewed  Pertinent Labs Comments: H/h: 7.9/25.1, mcv: 76, mch: 24, mchc: 31.5, glu: 157  Pertinent Medications Reviewed: reviewed  Pertinent Medications Comments: Atorvastatin, insulin, abx, mag oxide, pantoprazole, polyethylene, senna-docusate, warfarin      Estimated/Assessed Needs    Weight Used For Calorie Calculations: 88 kg (194 lb)  Energy Calorie Requirements (kcal): 9430-1144 (20-25 kcal/kg)  Energy Need Method: Kcal/kg  Protein Requirements: 105 (1.2 g/kg)  Weight Used For Protein Calculations: 88 kg (194 lb)     Estimated Fluid Requirement Method: RDA Method  RDA Method (mL): 1759  CHO Requirement: 219-274      Nutrition Prescription Ordered    Current Diet Order: NPO    Evaluation of Received Nutrient/Fluid Intake    I/O: -9.6 L since admit  Comments: LBM 11/8  % Intake of Estimated Energy Needs: 75 - 100 %  % Meal Intake: 75 - 100 %    Nutrition Risk    Level of Risk/Frequency of Follow-up: low (f/u 1x/week)     Monitor and Evaluation    Food and Nutrient Intake: energy intake, food and beverage intake  Physical Activity and Function: nutrition-related ADLs and IADLs  Anthropometric Measurements: height/length, weight, weight change, body mass index  Biochemical Data, Medical Tests and Procedures: electrolyte and renal panel, gastrointestinal profile, glucose/endocrine profile, inflammatory profile, lipid profile     Nutrition Follow-Up    RD Follow-up?: Yes    Sneha Hong RD, LDN

## 2023-11-14 NOTE — PROGRESS NOTES
Order placed for VAD 30 day supply of Daily Kits and charge placed per protocol. Kits to be delivered to patient's bedside.

## 2023-11-14 NOTE — PROGRESS NOTES
Patient was seen today in the hospital. Patient awake at time of visit. No family at bedside. Patient has no equipment needs at this time.   - Patient has altered mental status on top of underlying dementia in the setting of dehydration/RAYRAY.  -Appears to be improving with improvement in RAYRAY and uremia  -Negative U/A, no indication of active infection at current time. Afebrile, leukocytosis  -No evidence of acute CVA or hemorrhage on CT  -Patient's EKG is not concerning, patient with history of stage I diastolic dysfunction and normal EF in December 2017, will hold off on further cardiac workup and telemetry monitoring at this time - Patient has altered mental status on top of underlying dementia possibly in the setting of dehydration/RAYRAY.  -Negative U/A, no indication of active infection at current time. Afebrile, leukocytosis.  -RVP negative  -May be related to dehydration/RAYRAY. BUN not elevated enough to be in uremia, but would monitor MS improvement with IVFs to downtrend RAYRAY  -No evidence of acute CVA or hemorrhage on CT  -Patient's EKG is not concerning, patient with history of stage I diastolic dysfunction and normal EF in December 2017, will hold off on further cardiac workup and telemetry monitoring at this time

## 2023-11-14 NOTE — PLAN OF CARE
KULDIPW visited with pt at bedside. Witnessed and signed HCPOA for pt, which names his girlfriend Darlyn rWight as his primary decision maker, and his mother Malou Dumont as his backup. No other needs at this time.     Debra Asif, DARLENE  Palliative Medicine

## 2023-11-14 NOTE — HPI
"Per cardiology H&P  "Kevan Queen is a 38 y.o. male on LVAD presenting with c/o defibrillator coming out of his chest. He stated that this started a few months ago. He also c/o that his picc line looks longer that the previous ones he has had. The picc line is functioning and there is no irritation around it. He uses it daily for his milrinone infusion. He states that his defibrillator has been coming out of his chest for around a week now. He denied chest trauma, fever, nausea, vomiting or diarrhea. Breathing is at baseline and comfortable at rest. Denies orthopnea or PND or LVAD alarms."     Now s/p ICD extraction. Patient is on chronic milrinone. Palliative consulted for GOC. Patient with no complaints. Patient with hx of CVA a couple months ago. Recurrent admissions   "

## 2023-11-14 NOTE — ASSESSMENT & PLAN NOTE
Endocrinology consulted for BG management.   BG goal 140-180     - Levemir 18 units BID   - Increase Novolog (aspart) insulin to 10 Units SQ TIDWM and prn for BG excursions MDC SSI (150/25).  - BG checks AC/HS   - Hypoglycemia protocol in place    ** Please notify Endocrine for any change and/or advance in diet**  ** Please call Endocrine for any BG related issues **    Discharge Planning:   TBD. Please notify endocrinology prior to discharge.

## 2023-11-14 NOTE — PLAN OF CARE
[facility]   Heart Transplant/VAD Clinic   [address] [place] [address]   [phone] after hours          [phone] fax     VAD HOME  HEALTH ORDERS      Admit to Home Health    Diagnosis:   Patient Active Problem List   Diagnosis    Acute on chronic combined systolic and diastolic heart failure, NYHA class 4    Anxiety disorder, unspecified    Anemia of chronic disease    Ecstasy abuse    Cannabis use disorder, severe, dependence    Acute combined systolic and diastolic congestive heart failure    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    Type 2 diabetes mellitus with hyperglycemia    Insomnia    Tachycardia    Familial dilated cardiomyopathy    LVAD (left ventricular assist device) present    On mechanically assisted ventilation    Seizure-like activity    Temporal lobe seizure    Examination of participant in clinical trial    Right heart failure secondary to left heart failure-post LVAD surgery    History of substance abuse    Bacteremia due to Staphylococcus    Complications due to automatic implantable cardioverter-defibrillator    Chest pain    Pleural effusion    Embolic stroke involving left middle cerebral artery    Required emergent intubation    Noncompliance with medication regimen    Moderate malnutrition    ICD (implantable cardioverter-defibrillator) malfunction    Cellulitis       Patient is homebound due to:  NYHA Class IV HF. S/P LVAD placement.     Diet: Low Fat, Low cholesterol, 2Gm Na, Coumadin restrictions. 2000 cc fluid restriction    Acitivities: No Swimming, bathing, vacuuming, contact sports.    Fresh implants= Sternal Precautions    Nursing:   SN to complete comprehensive assessment including routine vital signs. Instruct on disease process and s/s of complications to report to MD. Review/verify medication list sent home with the patient at time of discharge  and instruct patient/caregiver as needed. Frequency may be adjusted  depending on start of care date.    **LVAD driveline exit site dressing change is to be completed per LVAD patient/caregiver only**.    Notify MD if:  SBP > 120 or < 80;   MAP > 80 or < 65;   HR > 120 or < 60;   Temp > 101;   Weight gain >3lbs in 1 day or 5lbs in 1 week.    LABS:  SN to perform labs: PT/INR per Coumadin clinic [phone].   Follow up INR date: Monday, 11/20/23  No Finger Sticks    Weekly CBC, CMP and CPK on Mondays with results faxed to [facility] while patient is on Daptomycin    HOME INFUSION THERAPY:   SN to perform Infusion Therapy/Central Line Care.  Review Central Line Care & Central Line Flush with patient.    Administer (drug and dose): Milrinone 0.25 mcg/kg/min with dosing weight 94.4 kg    Daptomycin 690 mg IV over 30 minutes every 24 hours with end date 11/23    Central line care:  Scrub the Hub: Prior to accessing the line, always perform a 30 second alcohol scrub  Each lumen of the central line is to be flushed at least daily with 10 mL Normal Saline and 3 mL Heparin flush (100 units/mL)  Skilled Nurse (SN) may draw blood from IV access  Blood Draw Procedure:   - Aspirate at least 5 mL of blood   - Discard   - Obtain specimen   - Change posiflow cap   - Flush with 20 mL Normal Saline followed by a                 3-5 mL Heparin flush (100 units/mL)  Central :   - Sterile dressing changes are done weekly and as needed.   - Use chlor-hexadine scrub to cleanse site, apply Biopatch to insertion site,       apply securement device dressing   - Posi-flow caps are changed weekly and after EVERY lab draw.   - If sterile gauze is under dressing to control oozing,                 dressing change must be performed every 24 hours until gauze is not needed.    Wound Care: perform wound care to left clavicle:  Cleanse wound with hypochlorous acid (Vashe)  Pat dry.  Apply a hypochlorous acid wet to moist gauze dressing (Vashe wet to dry)  to wound bed  Cover with an island border  (Yury)   Apply this BID        Send initial Home Health orders to [facility] attending physician on call.  Send follow up questions to VAD clinic MD [phone] or [phone].

## 2023-11-14 NOTE — PROGRESS NOTES
"[facility]  Endocrinology  Progress Note    Admit Date: 2023     Reason for Consult: Management of T2DM, Hyperglycemia      Surgical Procedure and Date: LVAD 2022     Diabetes diagnosis year:      Home Diabetes Medications:   -Levemir 8 units BID.   -Novolog 6 units TIDWM in addition to the following correction scale:     150 - 200 + 1 unit     201 - 250 + 2 units     251 - 300 + 3 units     301 - 350 + 4 units      > 350   + 5 units     How often checking glucose at home?  > 4 times per day  BG readings on regimen: 180's-200's  Hypoglycemia on the regimen?  No  Missed doses on regimen?  occasionally skips mealsn and will skip Novolog with breakfast      Diabetes Complications include:     Hyperglycemia     Complicating diabetes co morbidities:   History of MI, CHF, and CKD        HPI: Mr. Queen is a 37yo male with HF (EF 10-15%), s/p LVAD placement with HeartMate III, ICD placement 2022, DM2, epilepsy who presented as his ICD began to erode through his skin and was exposed. BG excursions noted in the 400's yesterday evening. Endocrine consulted to manage hyperglycemia and type 2 diabetes.     Lab Results   Component Value Date    HGBA1C 6.9 (H) 2023         Interval HPI:   No acute events overnight. Patient in room 309/309 A. Blood glucose variable. BG at and above goal on current insulin regimen (SSI, prandial, and basal insulin ). Steroid use- None.   5 Days Post-Op  Renal function- Normal   Vasopressors-  None     Diet diabetic Cardiac (Low Na/Chol); 2000 Calorie; Fluid - 1500mL; Standard Tray     Eatin%  Nausea: No  Hypoglycemia and intervention: No  Fever: No  TPN and/or TF: No    BP (!) 80/0 (BP Location: Left arm, Patient Position: Sitting)   Pulse 110   Temp 98.3 °F (36.8 °C) (Oral)   Resp 18   Ht 6' 3" (1.905 m)   Wt 84.9 kg (187 lb 2.7 oz)   SpO2 98%   BMI 23.39 kg/m²     Labs Reviewed and Include    Recent Labs   Lab 23  0438   * " "  CALCIUM 9.2   ALBUMIN 2.9*   PROT 7.0      K 4.3   CO2 28      BUN 12   CREATININE 1.2   ALKPHOS 131   ALT 17   AST 25   BILITOT 0.8     Lab Results   Component Value Date    WBC 10.27 11/14/2023    HGB 7.9 (L) 11/14/2023    HCT 25.1 (L) 11/14/2023    MCV 76 (L) 11/14/2023     11/14/2023     No results for input(s): "TSH", "FREET4" in the last 168 hours.  Lab Results   Component Value Date    HGBA1C 8.9 (H) 11/13/2023       Nutritional status:   Body mass index is 23.39 kg/m².  Lab Results   Component Value Date    ALBUMIN 2.9 (L) 11/14/2023    ALBUMIN 2.9 (L) 11/13/2023    ALBUMIN 2.9 (L) 11/13/2023     Lab Results   Component Value Date    PREALBUMIN 17 (L) 11/13/2023    PREALBUMIN 15 (L) 08/07/2023    PREALBUMIN 21 08/04/2023       Estimated Creatinine Clearance: 99.8 mL/min (based on SCr of 1.2 mg/dL).    Accu-Checks  Recent Labs     11/12/23  1613 11/12/23  1935 11/12/23  2104 11/13/23  0004 11/13/23  0402 11/13/23  0718 11/13/23  1141 11/13/23  1652 11/13/23  1954 11/14/23  0822   POCTGLUCOSE 173* 240* 197* 194* 177* 272* 125* 184* 336* 269*       Current Medications and/or Treatments Impacting Glycemic Control  Immunotherapy:    Immunosuppressants       None          Steroids:   Hormones (From admission, onward)      None          Pressors:    Autonomic Drugs (From admission, onward)      None          Hyperglycemia/Diabetes Medications:   Antihyperglycemics (From admission, onward)      Start     Stop Route Frequency Ordered    11/14/23 1200  insulin aspart U-100 pen 10 Units         -- SubQ 4 times daily with meals & nightly 11/14/23 0847    11/14/23 0729  insulin aspart U-100 pen 0-10 Units         -- SubQ Before meals & nightly PRN 11/14/23 0729    11/13/23 1100  insulin detemir U-100 (Levemir) pen 18 Units         -- SubQ 2 times daily 11/13/23 1058            ASSESSMENT and PLAN    Cardiac/Vascular  * ICD (implantable cardioverter-defibrillator) malfunction  Managed per primary " team  Avoid hypoglycemia          LVAD (left ventricular assist device) present  Managed per primary team  Avoid hypoglycemia        Endocrine  Type 2 diabetes mellitus with hyperglycemia  Endocrinology consulted for BG management.   BG goal 140-180     - Levemir 18 units BID   - Increase Novolog (aspart) insulin to 10 Units SQ TIDWM and prn for BG excursions [facility] SSI (150/25).  - BG checks AC/HS   - Hypoglycemia protocol in place    ** Please notify Endocrine for any change and/or advance in diet**  ** Please call Endocrine for any BG related issues **    Discharge Planning:   TBD. Please notify endocrinology prior to discharge.            Susanna Doty PA-C  Endocrinology  [facility]

## 2023-11-14 NOTE — PLAN OF CARE
Recommendations    1) Continue cardiac diet w/ fluid per MD - add double portions   2) RD Following     Goals: Meet % een/epn by next RD f/u  Nutrition Goal Status: progressing towards goal  Communication of RD Recs: other (comment) (POC)

## 2023-11-14 NOTE — PROGRESS NOTES
[facility]  Cardiac Electrophysiology  Progress Note    Admission Date: 11/8/2023  Code Status: Full Code   Attending Physician: Josh Ryan, *   Expected Discharge Date: 11/13/2023  Principal Problem:ICD (implantable cardioverter-defibrillator) malfunction    Subjective:     Interval History: Clean pressure dressing overlying procedure site. No obvious drainage, erythema, nor swelling.    Review of Systems   Constitutional: Negative for chills and fever.   Cardiovascular:  Negative for chest pain, orthopnea and palpitations.   Respiratory:  Negative for cough and shortness of breath.    Gastrointestinal:  Negative for abdominal pain.   Neurological:  Negative for dizziness, headaches and light-headedness.   Psychiatric/Behavioral:  Negative for altered mental status.      Objective:     Vital Signs (Most Recent):  Temp: 98.3 °F (36.8 °C) (11/14/23 0725)  Pulse: 110 (11/14/23 0725)  Resp: 18 (11/14/23 0725)  BP: (!) 80/0 (11/14/23 0725)  SpO2: 98 % (11/14/23 0725) Vital Signs (24h Range):  Temp:  [97.9 °F (36.6 °C)-98.9 °F (37.2 °C)] 98.3 °F (36.8 °C)  Pulse:  [] 110  Resp:  [18-20] 18  SpO2:  [98 %-100 %] 98 %  BP: (76-86)/(0) 80/0     Weight: 84.9 kg (187 lb 2.7 oz)  Body mass index is 23.39 kg/m².     SpO2: 98 %        Physical Exam  Vitals reviewed.   Constitutional:       General: He is not in acute distress.     Appearance: Normal appearance. He is not toxic-appearing.   HENT:      Head: Normocephalic and atraumatic.      Mouth/Throat:      Mouth: Mucous membranes are moist.   Cardiovascular:      Rate and Rhythm: Normal rate and regular rhythm.      Heart sounds: No murmur heard.     No friction rub. No gallop.      Comments: LUCW prominence from hematoma in pocket site post extraction  VAD hum+  Pulmonary:      Effort: Pulmonary effort is normal. No respiratory distress.      Breath sounds: Normal breath sounds.   Abdominal:      Palpations: Abdomen is soft.       Tenderness: There is no abdominal tenderness.   Musculoskeletal:      Right lower leg: No edema.      Left lower leg: No edema.   Skin:     General: Skin is warm.   Neurological:      Mental Status: He is alert. Mental status is at baseline.        Significant Labs: EP:   Recent Labs   Lab 11/12/23  1303 11/12/23  2052 11/13/23 0407 11/13/23 0407 11/14/23  0438   NA  --  135* 136  --  136   K  --  4.1 4.3  --  4.3   CL  --  97 98  --  100   CO2  --  30* 28  --  28   GLU  --  192* 175*  --  157*   BUN  --  14 13  --  12   CREATININE  --  1.4 1.2  --  1.2   CALCIUM  --  8.9 9.1  --  9.2   PROT  --   --  7.1  7.1  --  7.0   ALBUMIN  --   --  2.9*  2.9*  --  2.9*   BILITOT  --   --  0.8  0.8  --  0.8   ALKPHOS  --   --  131  131  --  131   AST  --   --  25  25  --  25   ALT  --   --  15  15  --  17   ANIONGAP  --  8 10  --  8   WBC  --   --  12.26  --  10.27   HGB 8.1*  --  8.1*  --  7.9*   HCT  --   --  26.0*   < > 25.1*   PLT  --   --  338  --  346   INR  --   --  1.1  --  1.1    < > = values in this interval not displayed.         Significant Imaging: Echocardiogram: Transthoracic echo (TTE) complete (Cupid Only):   Results for orders placed or performed during the hospital encounter of 08/31/23   Echo   Result Value Ref Range    Ascending aorta 2.99 cm    STJ 2.54 cm    IVS 0.75 0.6 - 1.1 cm    LA size 2.80 cm    Left Atrium Major Axis 6.27 cm    Left Atrium Minor Axis 5.13 cm    LVIDd 7.11 (A) 3.5 - 6.0 cm    LVIDs 7.01 (A) 2.1 - 4.0 cm    LVOT diameter 2.28 cm    Posterior Wall 0.57 (A) 0.6 - 1.1 cm    MV Peak A Carloz 0.61 m/s    E wave deceleration time 113.67 msec    MV Peak E Carloz 0.64 m/s    RA Major Axis 5.87 cm    RA Width 4.79 cm    RVDD 6.66 cm    Sinus 3.04 cm    TAPSE 1.16 cm    TR Max Carloz 2.72 m/s    TDI LATERAL 0.05 m/s    TDI SEPTAL 0.04 m/s    LA WIDTH 4.09 cm    MV stenosis pressure 1/2 time 32.97 ms    LV Diastolic Volume 264.44 mL    LV Systolic Volume 256.33 mL    RV S' 7.57 cm/s    MV mean  gradient 1 mmHg    MV peak gradient 3 mmHg    MV VTI 17.13 cm    LV LATERAL E/E' RATIO 12.80 m/s    LV SEPTAL E/E' RATIO 16.00 m/s    FS 1 %    LA volume 54.93 cm3    LV mass 199.99 g    ZLVIDD -0.40     ZLVIDS 3.51     Left Ventricle Relative Wall Thickness 0.16 cm    MV valve area p 1/2 method 6.67 cm2    E/A ratio 1.05     Mean e' 0.05 m/s    LVOT area 4.1 cm2    E/E' ratio 14.22 m/s    LV Systolic Volume Index 117.0 mL/m2    LV Diastolic Volume Index 120.75 mL/m2    LA Volume Index 25.1 mL/m2    LV Mass Index 91 g/m2    Triscuspid Valve Regurgitation Peak Gradient 30 mmHg    BSA 2.18 m2    TV resting pulmonary artery pressure 38 mmHg    RV TB RVSP 11 mmHg    Est. RA pres 8 mmHg    Narrative      LVAD: The aortic valve does not open. The ventricular septum is at   midline.HM3 5100    Left Ventricle: The left ventricle is severely dilated. Ventricular   mass is normal. Normal wall thickness. Normal wall motion. There is   severely reduced systolic function with a visually estimated ejection   fraction of 10 -15%. There is normal diastolic function.    Right Ventricle: Normal right ventricular cavity size. Wall thickness   is normal. Right ventricle wall motion  is normal. Systolic function is   normal.    Mitral Valve: There is mild regurgitation.    Tricuspid Valve: There is moderate to severe regurgitation.    Pulmonary Artery: The estimated pulmonary artery systolic pressure is   38 mmHg.    IVC/SVC: Intermediate venous pressure at 8 mmHg.       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 Patient was admitted for concerns of infected scICD. [employer] scientific scICD (10/2020 at [facility]) protruding from L chest wall on admit. Underwent extraction on 11/9/2023 complicated with hematoma/ bleeding with wound vac removal. General surgery did suture ligation of bleeding vessels and application  of hemostatic agents. Extracted device is growing MRSA on aerobic cultures 11/11/2023.   - ID consulted, recommendations appreciated   - Was on warfarin for AC to prevent VAD thrombosis. INR 1.1 11/14/2023.  - Device Interrogation. No VT shocks prior  - Wound care following for management of extraction site. Pending decision on ongoing packing vs reapplication of wound vac.  If new ICD for primary prevention is required, please contact EP.    LVAD (left ventricular assist device) present  LVAD implanted 06/23/2023  Management per [facility] team        Won Rowland MD  Cardiac Electrophysiology  [facility]

## 2023-11-15 VITALS
BODY MASS INDEX: 23.28 KG/M2 | SYSTOLIC BLOOD PRESSURE: 84 MMHG | WEIGHT: 187.19 LBS | OXYGEN SATURATION: 96 % | RESPIRATION RATE: 18 BRPM | TEMPERATURE: 98 F | HEIGHT: 75 IN | HEART RATE: 115 BPM

## 2023-11-15 LAB
ALBUMIN SERPL BCP-MCNC: 3 G/DL (ref 3.5–5.2)
ALBUMIN SERPL BCP-MCNC: 3 G/DL (ref 3.5–5.2)
ALP SERPL-CCNC: 157 U/L (ref 55–135)
ALP SERPL-CCNC: 157 U/L (ref 55–135)
ALT SERPL W/O P-5'-P-CCNC: 29 U/L (ref 10–44)
ALT SERPL W/O P-5'-P-CCNC: 29 U/L (ref 10–44)
ANION GAP SERPL CALC-SCNC: 7 MMOL/L (ref 8–16)
APTT PPP: 25.2 SEC (ref 21–32)
AST SERPL-CCNC: 38 U/L (ref 10–40)
AST SERPL-CCNC: 38 U/L (ref 10–40)
BACTERIA BLD CULT: NORMAL
BACTERIA BLD CULT: NORMAL
BASOPHILS # BLD AUTO: 0.03 K/UL (ref 0–0.2)
BASOPHILS NFR BLD: 0.3 % (ref 0–1.9)
BILIRUB DIRECT SERPL-MCNC: 0.4 MG/DL (ref 0.1–0.3)
BILIRUB SERPL-MCNC: 0.8 MG/DL (ref 0.1–1)
BILIRUB SERPL-MCNC: 0.8 MG/DL (ref 0.1–1)
BNP SERPL-MCNC: 67 PG/ML (ref 0–99)
BUN SERPL-MCNC: 19 MG/DL (ref 6–20)
CALCIUM SERPL-MCNC: 9.2 MG/DL (ref 8.7–10.5)
CHLORIDE SERPL-SCNC: 98 MMOL/L (ref 95–110)
CO2 SERPL-SCNC: 28 MMOL/L (ref 23–29)
CREAT SERPL-MCNC: 1.3 MG/DL (ref 0.5–1.4)
CRP SERPL-MCNC: 1.1 MG/L (ref 0–8.2)
DIFFERENTIAL METHOD: ABNORMAL
EOSINOPHIL # BLD AUTO: 0.1 K/UL (ref 0–0.5)
EOSINOPHIL NFR BLD: 0.9 % (ref 0–8)
ERYTHROCYTE [DISTWIDTH] IN BLOOD BY AUTOMATED COUNT: 21.5 % (ref 11.5–14.5)
EST. GFR  (NO RACE VARIABLE): >60 ML/MIN/1.73 M^2
GLUCOSE SERPL-MCNC: 192 MG/DL (ref 70–110)
HCT VFR BLD AUTO: 26.5 % (ref 40–54)
HGB BLD-MCNC: 8.2 G/DL (ref 14–18)
IMM GRANULOCYTES # BLD AUTO: 0.03 K/UL (ref 0–0.04)
IMM GRANULOCYTES NFR BLD AUTO: 0.3 % (ref 0–0.5)
INR PPP: 1.3 (ref 0.8–1.2)
LDH SERPL L TO P-CCNC: 253 U/L (ref 110–260)
LYMPHOCYTES # BLD AUTO: 2.3 K/UL (ref 1–4.8)
LYMPHOCYTES NFR BLD: 22.2 % (ref 18–48)
MAGNESIUM SERPL-MCNC: 2 MG/DL (ref 1.6–2.6)
MCH RBC QN AUTO: 23.6 PG (ref 27–31)
MCHC RBC AUTO-ENTMCNC: 30.9 G/DL (ref 32–36)
MCV RBC AUTO: 76 FL (ref 82–98)
MONOCYTES # BLD AUTO: 0.7 K/UL (ref 0.3–1)
MONOCYTES NFR BLD: 6.5 % (ref 4–15)
NEUTROPHILS # BLD AUTO: 7.4 K/UL (ref 1.8–7.7)
NEUTROPHILS NFR BLD: 69.8 % (ref 38–73)
NRBC BLD-RTO: 0 /100 WBC
PHOSPHATE SERPL-MCNC: 3.6 MG/DL (ref 2.7–4.5)
PLATELET # BLD AUTO: 381 K/UL (ref 150–450)
PMV BLD AUTO: 10.3 FL (ref 9.2–12.9)
POCT GLUCOSE: 120 MG/DL (ref 70–110)
POCT GLUCOSE: 142 MG/DL (ref 70–110)
POCT GLUCOSE: 149 MG/DL (ref 70–110)
POTASSIUM SERPL-SCNC: 3.9 MMOL/L (ref 3.5–5.1)
PREALB SERPL-MCNC: 21 MG/DL (ref 20–43)
PROT SERPL-MCNC: 7.4 G/DL (ref 6–8.4)
PROT SERPL-MCNC: 7.4 G/DL (ref 6–8.4)
PROTHROMBIN TIME: 13.5 SEC (ref 9–12.5)
RBC # BLD AUTO: 3.48 M/UL (ref 4.6–6.2)
SODIUM SERPL-SCNC: 133 MMOL/L (ref 136–145)
WBC # BLD AUTO: 10.55 K/UL (ref 3.9–12.7)

## 2023-11-15 PROCEDURE — 25000003 PHARM REV CODE 250: Performed by: NURSE PRACTITIONER

## 2023-11-15 PROCEDURE — 83615 LACTATE (LD) (LDH) ENZYME: CPT | Performed by: NURSE PRACTITIONER

## 2023-11-15 PROCEDURE — 27000248 HC VAD-ADDITIONAL DAY

## 2023-11-15 PROCEDURE — 83735 ASSAY OF MAGNESIUM: CPT | Performed by: NURSE PRACTITIONER

## 2023-11-15 PROCEDURE — 80053 COMPREHEN METABOLIC PANEL: CPT | Performed by: NURSE PRACTITIONER

## 2023-11-15 PROCEDURE — 99233 SBSQ HOSP IP/OBS HIGH 50: CPT | Mod: ,,, | Performed by: STUDENT IN AN ORGANIZED HEALTH CARE EDUCATION/TRAINING PROGRAM

## 2023-11-15 PROCEDURE — 86140 C-REACTIVE PROTEIN: CPT | Performed by: NURSE PRACTITIONER

## 2023-11-15 PROCEDURE — 84100 ASSAY OF PHOSPHORUS: CPT | Performed by: NURSE PRACTITIONER

## 2023-11-15 PROCEDURE — 93750 PR INTERROGATE VENT ASSIST DEV, IN PERSON, W PHYSICIAN ANALYSIS: ICD-10-PCS | Mod: ,,, | Performed by: INTERNAL MEDICINE

## 2023-11-15 PROCEDURE — 99233 PR SUBSEQUENT HOSPITAL CARE,LEVL III: ICD-10-PCS | Mod: FS,,, | Performed by: NURSE PRACTITIONER

## 2023-11-15 PROCEDURE — 85610 PROTHROMBIN TIME: CPT | Performed by: NURSE PRACTITIONER

## 2023-11-15 PROCEDURE — 99232 SBSQ HOSP IP/OBS MODERATE 35: CPT | Mod: ,,,

## 2023-11-15 PROCEDURE — A4216 STERILE WATER/SALINE, 10 ML: HCPCS | Performed by: INTERNAL MEDICINE

## 2023-11-15 PROCEDURE — 25000003 PHARM REV CODE 250: Performed by: INTERNAL MEDICINE

## 2023-11-15 PROCEDURE — 99233 SBSQ HOSP IP/OBS HIGH 50: CPT | Mod: FS,,, | Performed by: NURSE PRACTITIONER

## 2023-11-15 PROCEDURE — 85730 THROMBOPLASTIN TIME PARTIAL: CPT | Performed by: NURSE PRACTITIONER

## 2023-11-15 PROCEDURE — 84134 ASSAY OF PREALBUMIN: CPT | Performed by: NURSE PRACTITIONER

## 2023-11-15 PROCEDURE — 99233 PR SUBSEQUENT HOSPITAL CARE,LEVL III: ICD-10-PCS | Mod: ,,, | Performed by: STUDENT IN AN ORGANIZED HEALTH CARE EDUCATION/TRAINING PROGRAM

## 2023-11-15 PROCEDURE — 63600175 PHARM REV CODE 636 W HCPCS: Performed by: NURSE PRACTITIONER

## 2023-11-15 PROCEDURE — 83880 ASSAY OF NATRIURETIC PEPTIDE: CPT | Performed by: NURSE PRACTITIONER

## 2023-11-15 PROCEDURE — 99232 PR SUBSEQUENT HOSPITAL CARE,LEVL II: ICD-10-PCS | Mod: ,,,

## 2023-11-15 PROCEDURE — 93750 INTERROGATION VAD IN PERSON: CPT | Mod: ,,, | Performed by: INTERNAL MEDICINE

## 2023-11-15 PROCEDURE — 85025 COMPLETE CBC W/AUTO DIFF WBC: CPT | Performed by: NURSE PRACTITIONER

## 2023-11-15 RX ORDER — LANCETS 33 GAUGE
EACH MISCELLANEOUS 4 TIMES DAILY
Qty: 200 EACH | Refills: 3 | Status: SHIPPED | OUTPATIENT
Start: 2023-11-15

## 2023-11-15 RX ORDER — WARFARIN 3 MG/1
TABLET ORAL
Qty: 60 TABLET | Refills: 5
Start: 2023-11-15 | End: 2023-11-24 | Stop reason: SDUPTHER

## 2023-11-15 RX ORDER — INSULIN PUMP SYRINGE, 3 ML
EACH MISCELLANEOUS
Qty: 1 EACH | Refills: 0 | Status: SHIPPED | OUTPATIENT
Start: 2023-11-15 | End: 2023-11-15 | Stop reason: HOSPADM

## 2023-11-15 RX ORDER — PEN NEEDLE, DIABETIC 30 GX3/16"
1 NEEDLE, DISPOSABLE MISCELLANEOUS
Qty: 200 EACH | Refills: 5 | Status: SHIPPED | OUTPATIENT
Start: 2023-11-15

## 2023-11-15 RX ORDER — INSULIN ASPART 100 [IU]/ML
10 INJECTION, SOLUTION INTRAVENOUS; SUBCUTANEOUS
Qty: 12 ML | Refills: 5 | Status: SHIPPED | OUTPATIENT
Start: 2023-11-15

## 2023-11-15 RX ADMIN — SPIRONOLACTONE 25 MG: 25 TABLET ORAL at 09:11

## 2023-11-15 RX ADMIN — SENNOSIDES AND DOCUSATE SODIUM 2 TABLET: 8.6; 5 TABLET ORAL at 09:11

## 2023-11-15 RX ADMIN — INSULIN ASPART 10 UNITS: 100 INJECTION, SOLUTION INTRAVENOUS; SUBCUTANEOUS at 04:11

## 2023-11-15 RX ADMIN — PANTOPRAZOLE SODIUM 40 MG: 40 TABLET, DELAYED RELEASE ORAL at 09:11

## 2023-11-15 RX ADMIN — ATORVASTATIN CALCIUM 40 MG: 40 TABLET, FILM COATED ORAL at 09:11

## 2023-11-15 RX ADMIN — ASPIRIN 81 MG: 81 TABLET, COATED ORAL at 09:11

## 2023-11-15 RX ADMIN — METHOCARBAMOL 1000 MG: 500 TABLET ORAL at 01:11

## 2023-11-15 RX ADMIN — Medication 10 ML: at 05:11

## 2023-11-15 RX ADMIN — INSULIN ASPART 10 UNITS: 100 INJECTION, SOLUTION INTRAVENOUS; SUBCUTANEOUS at 01:11

## 2023-11-15 RX ADMIN — INSULIN DETEMIR 18 UNITS: 100 INJECTION, SOLUTION SUBCUTANEOUS at 09:11

## 2023-11-15 RX ADMIN — LEVETIRACETAM 1000 MG: 500 TABLET, FILM COATED ORAL at 09:11

## 2023-11-15 RX ADMIN — METHOCARBAMOL 1000 MG: 500 TABLET ORAL at 04:11

## 2023-11-15 RX ADMIN — INSULIN ASPART 10 UNITS: 100 INJECTION, SOLUTION INTRAVENOUS; SUBCUTANEOUS at 09:11

## 2023-11-15 RX ADMIN — METHOCARBAMOL 1000 MG: 500 TABLET ORAL at 09:11

## 2023-11-15 RX ADMIN — WARFARIN SODIUM 7.5 MG: 7.5 TABLET ORAL at 04:11

## 2023-11-15 RX ADMIN — MILRINONE LACTATE IN DEXTROSE 0.25 MCG/KG/MIN: 200 INJECTION, SOLUTION INTRAVENOUS at 11:11

## 2023-11-15 RX ADMIN — Medication 400 MG: at 09:11

## 2023-11-15 RX ADMIN — FUROSEMIDE 80 MG: 80 TABLET ORAL at 09:11

## 2023-11-15 NOTE — PROGRESS NOTES
Discharge    Pt presents in room aaox4 with open affect. No caregivers present at time of visit. Pt voices agreement with plan to discharge to home today. Pt will go with [employer] ph [phone] for Milrinone and IV ABX. Orders in and agency is aware and working on mixing medications. Meds should be delivered by 3pm. Pt reports he is working on finding a ride home with a family member. If unable to find a family ride, SW will set up transport via Medicaid. Pt reports coping well and denies further needs, questions, concerns at this time and none indicated. Providing psychosocial and counseling support, education, resources, assistance and discharge planning as indicated. SW remains available.    Addendum: pt requested Medicaid Transport. SW phoned same at [phone]. Trip # is [id number].  time TBD by Medicaid Transportation.

## 2023-11-15 NOTE — PROGRESS NOTES
[facility] - [facility] Step[facility]  Heart Transplant  Progress Note    Patient Name: Kevan Queen  MRN: [id number]  Admission Date: 11/8/2023  Hospital Length of Stay: 7 days  Attending Physician: Dalia Crum MD  Primary Care Provider: Rosio Armendariz FNP  Principal Problem:ICD (implantable cardioverter-defibrillator) malfunction    Subjective:   Interval History: No complaints or overnight events. INR is 1.3 but not bridging with Heparin given recent development of hematoma at ICD explant site. Wants to discharge home today once he gets checked off on ICD dressing change and if transportation can be arranged. He will f/u with Dr. Baldwin in ID 11/29 and in our VAD clinic likely same day. Per EP, as he has not required any tachytherapies, has a functioning HeartMate III LVAD, and as he remains a high risk for repeated infection, he does not require reimplantation of an ICD at this time. He does not require LifeVest fitting at this time.    Continuous Infusions:   sodium chloride 0.9% Stopped (11/11/23 1021)    milrinone 20mg/100ml D5W (200mcg/ml) 0.25 mcg/kg/min (11/15/23 1105)     Scheduled Meds:   aspirin  81 mg Oral Daily    atorvastatin  40 mg Oral Daily    DAPTOmycin (CUBICIN) IV (PEDS and ADULTS)  8 mg/kg Intravenous Q24H    furosemide  80 mg Oral Daily    insulin aspart U-100  10 Units Subcutaneous QID (WM & HS)    insulin detemir U-100  18 Units Subcutaneous BID    levETIRAcetam  1,000 mg Oral BID    magnesium oxide  400 mg Oral BID    methocarbamoL  1,000 mg Oral QID    mirtazapine  15 mg Oral Nightly    pantoprazole  40 mg Oral Daily    polyethylene glycol  17 g Oral Daily    senna-docusate 8.6-50 mg  2 tablet Oral BID    sodium chloride 0.9%  10 mL Intravenous Q6H    spironolactone  25 mg Oral Daily    warfarin  7.5 mg Oral Daily     PRN Meds:acetaminophen, dextrose 10%, dextrose 10%, dextrose 10%, dextrose 10%, dextrose, dextrose, glucagon (human recombinant), glucose, influenza, insulin aspart U-100,  sodium chloride 0.9%, Flushing PICC/Midline Protocol **AND** sodium chloride 0.9% **AND** sodium chloride 0.9%    Review of patient's allergies indicates:   Allergen Reactions    Bumex [bumetanide] Hives    Torsemide Hives     Objective:     Vital Signs (Most Recent):  Temp: 97.9 °F (36.6 °C) (11/15/23 1144)  Pulse: (!) 118 (11/15/23 1045)  Resp: 18 (11/15/23 0523)  BP: (!) 78/0 (11/15/23 1100)  SpO2: 95 % (11/14/23 1627) Vital Signs (24h Range):  Temp:  [97.9 °F (36.6 °C)-98.4 °F (36.9 °C)] 97.9 °F (36.6 °C)  Pulse:  [] 118  Resp:  [18] 18  SpO2:  [95 %] 95 %  BP: (78-82)/(0) 78/0     Patient Vitals for the past 72 hrs (Last 3 readings):   Weight   11/14/23 0414 84.9 kg (187 lb 2.7 oz)     Body mass index is 23.39 kg/m².      Intake/Output Summary (Last 24 hours) at 11/15/2023 1213  Last data filed at 11/15/2023 0900  Gross per 24 hour   Intake 922 ml   Output 800 ml   Net 122 ml       Hemodynamic Parameters:  CVP:  [7 mmHg] 7 mmHg    Telemetry: ST       Physical Exam  Constitutional:       Appearance: Normal appearance.   HENT:      Head: Normocephalic and atraumatic.   Eyes:      Conjunctiva/sclera: Conjunctivae normal.      Pupils: Pupils are equal, round, and reactive to light.   Neck:      Comments: Do not appreciate elevated JVP  Pulmonary:      Effort: Pulmonary effort is normal.      Breath sounds: Normal breath sounds.   Abdominal:      General: Bowel sounds are normal.      Palpations: Abdomen is soft.   Musculoskeletal:         General: No swelling. Normal range of motion.      Cervical back: Normal range of motion and neck supple.   Skin:     General: Skin is warm and dry.      Capillary Refill: Capillary refill takes 2 to 3 seconds.   Neurological:      General: No focal deficit present.      Mental Status: He is alert and oriented to person, place, and time.   Psychiatric:         Mood and Affect: Mood normal.         Behavior: Behavior normal.         Thought Content: Thought content normal.          Judgment: Judgment normal.            Significant Labs:  CBC:  Recent Labs   Lab 11/13/23 0407 11/14/23 0438 11/15/23  0544   WBC 12.26 10.27 10.55   RBC 3.42* 3.29* 3.48*   HGB 8.1* 7.9* 8.2*   HCT 26.0* 25.1* 26.5*    346 381   MCV 76* 76* 76*   MCH 23.7* 24.0* 23.6*   MCHC 31.2* 31.5* 30.9*     BNP:  Recent Labs   Lab 11/08/23  1407 11/13/23 0407 11/15/23  0544   * 116* 67     CMP:  Recent Labs   Lab 11/13/23  0407 11/14/23  0438 11/15/23  0544   * 157* 192*   CALCIUM 9.1 9.2 9.2   ALBUMIN 2.9*  2.9* 2.9* 3.0*  3.0*   PROT 7.1  7.1 7.0 7.4  7.4    136 133*   K 4.3 4.3 3.9   CO2 28 28 28   CL 98 100 98   BUN 13 12 19   CREATININE 1.2 1.2 1.3   ALKPHOS 131  131 131 157*  157*   ALT 15  15 17 29  29   AST 25  25 25 38  38   BILITOT 0.8  0.8 0.8 0.8  0.8      Coagulation:   Recent Labs   Lab 11/13/23 0407 11/14/23 0438 11/15/23  0544   INR 1.1 1.1 1.3*   APTT 22.7 24.2 25.2     LDH:  Recent Labs   Lab 11/13/23 0407 11/14/23 0438 11/15/23  0544   * 236 253     Microbiology:  Microbiology Results (last 7 days)       Procedure Component Value Units Date/Time    Blood culture [[id number]] Collected: 11/10/23 0403    Order Status: Completed Specimen: Blood from Peripheral, Wrist, Right Updated: 11/15/23 0612     Blood Culture, Routine No growth after 5 days.    Blood culture [[id number]] Collected: 11/10/23 0400    Order Status: Completed Specimen: Blood from Peripheral, Wrist, Left Updated: 11/15/23 0612     Blood Culture, Routine No growth after 5 days.    Blood culture x two cultures. Draw prior to antibiotics. [[id number]] Collected: 11/08/23 1407    Order Status: Completed Specimen: Blood from Peripheral, Lower Arm, Left Updated: 11/13/23 1612     Blood Culture, Routine No growth after 5 days.    Narrative:      Aerobic and anaerobic    Blood culture x two cultures. Draw prior to antibiotics. [[id number]] Collected: 11/08/23 1408    Order Status: Completed  Specimen: Blood from Peripheral, Antecubital, Left Updated: 11/13/23 1612     Blood Culture, Routine No growth after 5 days.    Narrative:      Aerobic and anaerobic    Aerobic culture [[id number]]  (Abnormal)  (Susceptibility) Collected: 11/09/23 1400    Order Status: Completed Specimen: Incision site from Chest, Left Updated: 11/13/23 1551     Aerobic Bacterial Culture STAPHYLOCOCCUS EPIDERMIDIS  Rare      Narrative:      Deep pocket #2    Culture, Anaerobe [[id number]] Collected: 11/09/23 1430    Order Status: Completed Specimen: Incision site from Chest, Left Updated: 11/13/23 1350     Anaerobic Culture No anaerobes isolated    Narrative:      Lead tip    Culture, Anaerobe [[id number]] Collected: 11/09/23 1415    Order Status: Completed Specimen: Incision site from Chest, Left Updated: 11/13/23 1349     Anaerobic Culture No anaerobes isolated    Narrative:      Superior pocket #1    Culture, Anaerobe [[id number]] Collected: 11/09/23 1400    Order Status: Completed Specimen: Incision site from Chest, Left Updated: 11/13/23 1349     Anaerobic Culture No anaerobes isolated    Narrative:      Deep pocket #2    Culture, Anaerobe [[id number]] Collected: 11/09/23 1400    Order Status: Completed Specimen: Incision site from Chest, Left Updated: 11/13/23 1348     Anaerobic Culture No anaerobes isolated    Narrative:      Deep pocket #1    Culture, Anaerobe [[id number]] Collected: 11/09/23 1400    Order Status: Completed Specimen: Incision site from Chest, Left Updated: 11/13/23 1347     Anaerobic Culture No anaerobes isolated    Narrative:      Superficial pocket #2    Culture, Anaerobe [[id number]] Collected: 11/09/23 1400    Order Status: Completed Specimen: Incision site from Chest, Left Updated: 11/13/23 1346     Anaerobic Culture No anaerobes isolated    Narrative:      Superficial pocket #1    Aerobic culture [[id number]]  (Abnormal)  (Susceptibility) Collected: 11/09/23 1400    Order Status: Completed Specimen:  Incision site from Chest, Left Updated: 11/13/23 0950     Aerobic Bacterial Culture STAPHYLOCOCCUS AUREUS  Rare      Narrative:      Superficial pocket #1    Aerobic culture [[id number]]  (Abnormal)  (Susceptibility) Collected: 11/09/23 1415    Order Status: Completed Specimen: Incision site from Chest, Left Updated: 11/13/23 0949     Aerobic Bacterial Culture STAPHYLOCOCCUS AUREUS  Rare  Skin ramesh also present      Narrative:      Superior pocket #1    Aerobic culture [[id number]]  (Abnormal)  (Susceptibility) Collected: 11/09/23 1400    Order Status: Completed Specimen: Incision site from Chest, Left Updated: 11/13/23 0945     Aerobic Bacterial Culture STAPHYLOCOCCUS AUREUS  Rare        STAPHYLOCOCCUS EPIDERMIDIS  Rare      Narrative:      Deep pocket #1    Aerobic culture [[id number]]  (Abnormal)  (Susceptibility) Collected: 11/09/23 1400    Order Status: Completed Specimen: Incision site from Chest, Left Updated: 11/13/23 0944     Aerobic Bacterial Culture STAPHYLOCOCCUS AUREUS  Rare  Skin ramesh also present      Narrative:      Superficial pocket #2    Aerobic culture [[id number]] Collected: 11/09/23 1430    Order Status: Completed Specimen: Incision site from Chest, Left Updated: 11/13/23 0840     Aerobic Bacterial Culture No growth    Narrative:      Lead tip            I have reviewed all pertinent labs within the past 24 hours.    Estimated Creatinine Clearance: 92.1 mL/min (based on SCr of 1.3 mg/dL).    Diagnostic Results:  I have reviewed and interpreted all pertinent imaging results/findings within the past 24 hours.  Assessment and Plan:     Kevan Queen is a 38 y.o. male on LVAD presenting with c/o defibrillator coming out of his chest. He stated that this started a few months ago. He also c/o that his picc line looks longer that the previous ones he has had. The picc line is functioning and there is no irritation around it. He uses it daily for his milrinone infusion. He states that his  defibrillator has been coming out of his chest for around a week now. He denied chest trauma, fever, nausea, vomiting or diarrhea. Breathing is at baseline and comfortable at rest. Denies orthopnea or PND or LVAD alarms.        * ICD (implantable cardioverter-defibrillator) malfunction  -Patient presented with [employer] AICD falling out of L chest wall. Apparently had been this way for 1 week  -Unclear reason for dehiscence of ICD site  -BCx's -ve.  ICD pocket cxs +ve for MSSA and MRSE. D/C home on 2 week course of IV Daptomycin with end date 11/23. Weekly CBC, CMP and CPK will be drawn on Mondays by home health and faxed to ID Clinic while he is on Daptomycin  -s/p device extraction complicated by hematoma so wound vac removed. Hemostasis achieved with general surgery help.  -Orders for BID wet to dry dressing. Patient now feels comfortable doing the dressing changes himself  -D/C home with home health to f/u with Dr. Baldwin in ID clinic 11/29. Will have him f/u in our VAD clinic same day if possible    Complications due to automatic implantable cardioverter-defibrillator  Procedure: Device Interrogation Including analysis of device parameters  Current Settings: Ventricular Assist Device  Review of device function is stable/unstable         11/15/2023    11:10 AM 11/15/2023     7:34 AM 11/15/2023     5:24 AM 11/14/2023     8:55 PM 11/14/2023     4:46 PM 11/14/2023    12:00 PM 11/14/2023     8:18 AM   TXP LVAD INTERROGATIONS   Type  HeartMate3 HeartMate3 HeartMate3 HeartMate3 HeartMate3 HeartMate3   Flow 4.1 4.2 4.2 4.1 4.3 4.3 4.4   Speed 5150 5150 5100 5100 5150 5100 5100   PI 5.2 5.8 5.5 4.9 4.7 4.9 4.3   Power (Serna) 3.6 3.6 3.7 3.6 3.6 3.7 3.5   LSL     4750 4700 4700   Pulsatility   Intermittent pulse Intermittent pulse Intermittent pulse  Intermittent pulse         Seizure-like activity  Continue keppra    LVAD (left ventricular assist device) present  -HeartMate 3 Implanted on 6/29/2022 as DT with  RV failure on milrinone at 0.25 mcg/kg/min.  -Continue Coumadin, Goal INR  2.0-3.02.0-3.0. subtherapeutic today but not bridging with Heparin 2/2 recent hematoma development at ICD explant site  -LDH is stable.    -Speed set at 5100, LSL 4700 rpm  Review of device function is stable/unstable stable        11/13/2023    11:43 AM 11/13/2023     7:10 AM 11/13/2023     3:54 AM 11/12/2023    11:19 PM 11/12/2023     7:55 PM 11/12/2023     4:08 PM 11/12/2023    12:11 PM   TXP LVAD INTERROGATIONS   Type HeartMate3 HeartMate3 HeartMate3 HeartMate3 HeartMate3 HeartMate3 HeartMate3   Flow 4 4.2 4.2 4.4 4 4.1 4.2   Speed 5100 5100 5100 5100 5100 5100 5100   PI 3.2 4.9 5.1 4 5.8 5.6 5.1   Power (Serna) 3.6 3.5 3.6 3.6 3.5 3.7 3.6   LSL 4700 4700 4700  4700     Pulsatility Intermittent pulse Intermittent pulse Intermittent pulse  Intermittent pulse         Type 2 diabetes mellitus with hyperglycemia  Sliding scale insulin        Alana Cain NP [id number]  Heart Transplant  [facility] - [facility] Step[facility]

## 2023-11-15 NOTE — PROGRESS NOTES
[facility] - [facility] Step[facility]  Cardiac Electrophysiology  Progress Note    Admission Date: 11/8/2023  Code Status: Full Code   Attending Physician: Dalia Crum MD   Expected Discharge Date: 11/15/2023  Principal Problem:ICD (implantable cardioverter-defibrillator) malfunction    Subjective:     Interval History: Clean pressure dressing overlying procedure site. No obvious drainage, erythema, nor swelling.    Review of Systems   Constitutional: Negative for chills and fever.   Cardiovascular:  Negative for chest pain, orthopnea and palpitations.   Respiratory:  Negative for cough and shortness of breath.    Gastrointestinal:  Negative for abdominal pain.   Neurological:  Negative for dizziness, headaches and light-headedness.   Psychiatric/Behavioral:  Negative for altered mental status.      Objective:     Vital Signs (Most Recent):  Temp: 98 °F (36.7 °C) (11/15/23 0716)  Pulse: 106 (11/15/23 0523)  Resp: 18 (11/15/23 0523)  BP: (!) 82/0 (11/15/23 0731)  SpO2: 95 % (11/14/23 1627) Vital Signs (24h Range):  Temp:  [98 °F (36.7 °C)-98.4 °F (36.9 °C)] 98 °F (36.7 °C)  Pulse:  [] 106  Resp:  [18] 18  SpO2:  [95 %-100 %] 95 %  BP: (78-82)/(0) 82/0     Weight: 84.9 kg (187 lb 2.7 oz)  Body mass index is 23.39 kg/m².     SpO2: 95 %        Physical Exam  Vitals reviewed.   Constitutional:       General: He is not in acute distress.     Appearance: Normal appearance. He is not toxic-appearing.   HENT:      Head: Normocephalic and atraumatic.      Mouth/Throat:      Mouth: Mucous membranes are moist.   Cardiovascular:      Rate and Rhythm: Normal rate and regular rhythm.      Heart sounds: No murmur heard.     No friction rub. No gallop.      Comments: VAD hum+  Pulmonary:      Effort: Pulmonary effort is normal. No respiratory distress.      Breath sounds: Normal breath sounds.   Abdominal:      Palpations: Abdomen is soft.      Tenderness: There is no abdominal tenderness.   Musculoskeletal:      Right lower  leg: No edema.      Left lower leg: No edema.   Skin:     General: Skin is warm.   Neurological:      Mental Status: He is alert. Mental status is at baseline.        Significant Labs: EP:   Recent Labs   Lab 11/14/23  0438 11/15/23  0544    133*   K 4.3 3.9    98   CO2 28 28   * 192*   BUN 12 19   CREATININE 1.2 1.3   CALCIUM 9.2 9.2   PROT 7.0 7.4  7.4   ALBUMIN 2.9* 3.0*  3.0*   BILITOT 0.8 0.8  0.8   ALKPHOS 131 157*  157*   AST 25 38  38   ALT 17 29  29   ANIONGAP 8 7*   WBC 10.27 10.55   HGB 7.9* 8.2*   HCT 25.1* 26.5*    381   INR 1.1 1.3*         Significant Imaging: Echocardiogram: Transthoracic echo (TTE) complete ([facility] Only):   Results for orders placed or performed during the hospital encounter of 08/31/23   Echo   Result Value Ref Range    Ascending aorta 2.99 cm    STJ 2.54 cm    IVS 0.75 0.6 - 1.1 cm    LA size 2.80 cm    Left Atrium Major Axis 6.27 cm    Left Atrium Minor Axis 5.13 cm    LVIDd 7.11 (A) 3.5 - 6.0 cm    LVIDs 7.01 (A) 2.1 - 4.0 cm    LVOT diameter 2.28 cm    Posterior Wall 0.57 (A) 0.6 - 1.1 cm    MV Peak A Carloz 0.61 m/s    E wave deceleration time 113.67 msec    MV Peak E Carloz 0.64 m/s    RA Major Axis 5.87 cm    RA Width 4.79 cm    RVDD 6.66 cm    Sinus 3.04 cm    TAPSE 1.16 cm    TR Max Carloz 2.72 m/s    TDI LATERAL 0.05 m/s    TDI SEPTAL 0.04 m/s    LA WIDTH 4.09 cm    MV stenosis pressure 1/2 time 32.97 ms    LV Diastolic Volume 264.44 mL    LV Systolic Volume 256.33 mL    RV S' 7.57 cm/s    MV mean gradient 1 mmHg    MV peak gradient 3 mmHg    MV VTI 17.13 cm    LV LATERAL E/E' RATIO 12.80 m/s    LV SEPTAL E/E' RATIO 16.00 m/s    FS 1 %    LA volume 54.93 cm3    LV mass 199.99 g    ZLVIDD -0.40     ZLVIDS 3.51     Left Ventricle Relative Wall Thickness 0.16 cm    MV valve area p 1/2 method 6.67 cm2    E/A ratio 1.05     Mean e' 0.05 m/s    LVOT area 4.1 cm2    E/E' ratio 14.22 m/s    LV Systolic Volume Index 117.0 mL/m2    LV Diastolic Volume Index  120.75 mL/m2    LA Volume Index 25.1 mL/m2    LV Mass Index 91 g/m2    Triscuspid Valve Regurgitation Peak Gradient 30 mmHg    BSA 2.18 m2    TV resting pulmonary artery pressure 38 mmHg    RV TB RVSP 11 mmHg    Est. RA pres 8 mmHg    Narrative      LVAD: The aortic valve does not open. The ventricular septum is at   midline.HM3 5100    Left Ventricle: The left ventricle is severely dilated. Ventricular   mass is normal. Normal wall thickness. Normal wall motion. There is   severely reduced systolic function with a visually estimated ejection   fraction of 10 -15%. There is normal diastolic function.    Right Ventricle: Normal right ventricular cavity size. Wall thickness   is normal. Right ventricle wall motion  is normal. Systolic function is   normal.    Mitral Valve: There is mild regurgitation.    Tricuspid Valve: There is moderate to severe regurgitation.    Pulmonary Artery: The estimated pulmonary artery systolic pressure is   38 mmHg.    IVC/SVC: Intermediate venous pressure at 8 mmHg.       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 Patient was admitted for concerns of infected scICD. [employer] scICD (10/2020 at [facility]) protruding from L chest wall on admit. Underwent extraction on 11/9/2023 complicated with hematoma/ bleeding with wound vac removal. General surgery did suture ligation of bleeding vessels and application of hemostatic agents. Extracted device is growing MRSA on aerobic cultures 11/11/2023.   - ID consulted, outpatient daptomycin  - Device Interrogation shows no VT shocks prior. Would not recommend lifevest as patient has had no prior VT events, and would not serve as a bridge to ICD reimplantation. Discharge with a lifevest may be more likely to cause inappropriate shocks.  -Patient to follow up with wound  [facility] clinic for his extraction site care.    Thank you for this consult, EP signing off.    LVAD (left ventricular assist device) present  LVAD implanted 06/23/2023  Management per [facility] team        Won Rowland MD  Cardiac Electrophysiology  [facility] - Cardiology Stepdown

## 2023-11-15 NOTE — DISCHARGE SUMMARY
[facility] - Cardiology Stepdown  Heart Transplant  Discharge Summary      Patient Name: Kevan Queen  MRN: [id number]  Admission Date: 11/8/2023  Hospital Length of Stay: 7 days  Discharge Date and Time: 11/15/2023 2:47 PM  Attending Physician: Dalia Crum MD   Discharging Provider: Alana Cain NP  Primary Care Provider: Rosio Armendariz FNP     HPI: Kevan Queen is a 38 y.o. male on LVAD presenting with c/o defibrillator coming out of his chest. He stated that this started a few months ago. He also c/o that his picc line looks longer that the previous ones he has had. The picc line is functioning and there is no irritation around it. He uses it daily for his milrinone infusion. He states that his defibrillator has been coming out of his chest for around a week now. He denied chest trauma, fever, nausea, vomiting or diarrhea. Breathing is at baseline and comfortable at rest. Denies orthopnea or PND or LVAD alarms.        Procedure(s) (LRB):  EXTRACTION, ELECTRODE LEAD (Left)  Transesophageal echo (GUILLERMO) intra-procedure log documentation  REVISION, SKIN POCKET, FOR CARDIOVERTER-DEFIBRILLATOR     Hospital Course: EP extracted the entire device. Post op course was complicated by hematoma so wound vac was removed. Hemostasis achieved with Gen Surgery's help. BID wet to dry dressings to ICD explant site ordered, and patient was instructed on how to perform the dressing changes himself. Blood cxs were -ve. ICD pocket cxs +ve for MSSA and MRSE. ID recommended that he complete a 2 week course of IV Daptomycin with end date 11/23. Per EP, as he has not required any tachytherapies, has a functioning HeartMate III LVAD, and as he remains a high risk for repeated infection, he does not require reimplantation of an ICD at this time. He does not require LifeVest fitting at this time. He was discharged home with home health to f/u with Dr. Baldwin in ID clinic 11/29. Will have him f/u in our VAD clinic on the same date if  possible.    Consults (From admission, onward)          Status Ordering Provider     Inpatient consult to Social Work/Case Management  Once        Provider:  (Not yet assigned)    Acknowledged GALEN VINCENT     Inpatient consult to Social Work/Case Management  Once        Provider:  (Not yet assigned)    Acknowledged KEYLA SOLITARIO     Inpatient consult to Palliative Care  Once        Provider:  (Not yet assigned)    Completed KEYLA SOLITARIO     Inpatient consult to Endocrinology  Once        Provider:  (Not yet assigned)    Completed SHRUTHI VILLALBA     Inpatient consult to General Surgery  Once        Provider:  (Not yet assigned)    Completed JAYJAY CARVAJAL     Inpatient consult to PICC team (hospitals)  Once        Provider:  (Not yet assigned)    Completed SHRUTHI VILLALBA     Inpatient consult to Infectious Diseases  Once        Provider:  (Not yet assigned)    Completed TERESA ARGUELLES     Inpatient consult to Electrophysiology  Once        Provider:  (Not yet assigned)    Completed GISELE VICKERS     Inpatient consult to Registered Dietitian/Nutritionist  Once        Provider:  (Not yet assigned)    Completed JAYJAY CARVAJAL            Significant Diagnostic Studies: See above      Pending Diagnostic Studies:       None          Final Active Diagnoses:    Diagnosis Date Noted POA    PRINCIPAL PROBLEM:  ICD (implantable cardioverter-defibrillator) malfunction [T82.118A] 11/08/2023 Yes    Cellulitis [L03.90] 11/14/2023 Yes    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4     Notify your health care provider if you experience any of the following:  persistent nausea and vomiting or diarrhea     Notify your health care provider if you experience any of the following:  severe uncontrolled pain     Notify your health care provider if you experience any of the following:  redness, tenderness, or signs of infection (pain, swelling, redness, odor or green/yellow discharge around incision site)     Notify your health care provider if you experience any of the following:  difficulty breathing or increased cough     Notify your health care provider if you experience any of the following:  severe persistent headache     Notify your health care provider if you experience any of the following:  worsening rash     Notify your health care provider if you experience any of the following:  persistent dizziness, light-headedness, or visual disturbances     Notify your health care provider if you experience any of the following:  increased confusion or weakness     Activity as tolerated     Medications:  Reconciled Home Medications:      Medication List        START taking these medications      sodium chloride 0.9% SolP 50 mL with DAPTOmycin 500 mg SolR 688 mg  Inject 688 mg into the vein once daily.            CHANGE how you take these medications      insulin aspart U-100 100 unit/mL (3 mL) Inpn pen  Commonly known as: NovoLOG  Inject 10 Units into the skin 3 (three) times daily with meals. Plus Sliding Scale: 151-200: +1, 201-250: +2, 251-300: +3, 301-350: +4 and call MD.  (Max daily dose:  60 units)  What changed:   how much to take  additional instructions     insulin detemir U-100 (Levemir) 100 unit/mL (3 mL) Inpn pen  Inject 18 Units into the skin once daily.  What changed:   how much to take  when to take this     warfarin 3 MG  "tablet  Commonly known as: COUMADIN  Take 1.5 tablets (4.5mg) orally daily, except 2 tablets (6mg) on Wednesdays and Saturdays  What changed:   how much to take  how to take this  when to take this            CONTINUE taking these medications      acetaminophen 325 MG tablet  Commonly known as: TYLENOL  Take 2 tablets (650 mg total) by mouth every 6 (six) hours as needed for Pain.     aspirin 81 MG EC tablet  Commonly known as: ECOTRIN  Take 1 tablet (81 mg total) by mouth once daily.     atorvastatin 40 MG tablet  Commonly known as: LIPITOR  Take 1 tablet (40 mg total) by mouth once daily.     blood sugar diagnostic Strp  Use to test blood glucose 4 (four) times daily.     furosemide 80 MG tablet  Commonly known as: LASIX  Take 1 tablet (80 mg total) by mouth once daily.     lancets 33 gauge Misc  Use to test blood glucose 4 (four) times daily.     levETIRAcetam 1000 MG tablet  Commonly known as: KEPPRA  TAKE 1 TABLET(1000 MG) BY MOUTH TWICE DAILY     magnesium oxide 400 mg (241.3 mg magnesium) tablet  Commonly known as: MAG-OX  Take 1 tablet (400 mg total) by mouth once daily.     milrinone 20mg/100ml D5W (200mcg/ml) 20 mg/100 mL (200 mcg/mL) infusion  Commonly known as: PRIMACOR  Inject 23.6 mcg/min into the vein continuous.     mirtazapine 15 MG tablet  Commonly known as: REMERON  Take 1 tablet (15 mg total) by mouth nightly.     pantoprazole 40 MG tablet  Commonly known as: PROTONIX  Take 1 tablet (40 mg total) by mouth once daily.     pen needle, diabetic 32 gauge x 5/32" Ndle  Use to inject insulin 5 (five) times daily.     polyethylene glycol 17 gram Pwpk  Commonly known as: GLYCOLAX  Take 17 g by mouth once daily.     spironolactone 25 MG tablet  Commonly known as: ALDACTONE  Take 1 tablet (25 mg total) by mouth once daily.            STOP taking these medications      busPIRone 10 MG tablet  Commonly known as: BUSPAR     cefadroxil 500 MG Cap  Commonly known as: DURICEF     enoxaparin 40 mg/0.4 mL " Syrg  Commonly known as: LOVENOX     senna 8.6 mg tablet  Commonly known as: SENOKOT     valsartan 40 MG tablet  Commonly known as: DIOVAN            ASK your doctor about these medications      * TRUE METRIX GLUCOSE METER Misc  Generic drug: blood-glucose meter  Use as instructed  Ask about: Which instructions should I use?     * blood-glucose meter Misc  Commonly known as: TRUE METRIX GLUCOSE METER  Use to test blood glucose 4 (four) times daily.  Ask about: Which instructions should I use?           * This list has 2 medication(s) that are the same as other medications prescribed for you. Read the directions carefully, and ask your doctor or other care provider to review them with you.                  Alana Cain NP  Heart Transplant  [facility] - [facility] Step[facility]

## 2023-11-15 NOTE — SUBJECTIVE & OBJECTIVE
"Interval HPI:   No acute events overnight. Patient in room 309/309 A. Blood glucose stable. BG at and above goal on current insulin regimen (SSI, prandial, and basal insulin ). Steroid use- None.   6 Days Post-Op  Renal function- Normal   Vasopressors-  None     Diet diabetic Cardiac (Low Na/Chol), Double Portions; 2000 Calorie; Fluid - 2000mL; Standard Tray     Eatin%  Nausea: No  Hypoglycemia and intervention: No  Fever: No  TPN and/or TF: No    BP (!) 78/0 (BP Location: Left arm, Patient Position: Lying)   Pulse (!) 118   Temp 97.9 °F (36.6 °C) (Oral)   Resp 18   Ht 6' 3" (1.905 m)   Wt 84.9 kg (187 lb 2.7 oz)   SpO2 95%   BMI 23.39 kg/m²     Labs Reviewed and Include    Recent Labs   Lab 11/15/23  0544   *   CALCIUM 9.2   ALBUMIN 3.0*  3.0*   PROT 7.4  7.4   *   K 3.9   CO2 28   CL 98   BUN 19   CREATININE 1.3   ALKPHOS 157*  157*   ALT 29  29   AST 38  38   BILITOT 0.8  0.8     Lab Results   Component Value Date    WBC 10.55 11/15/2023    HGB 8.2 (L) 11/15/2023    HCT 26.5 (L) 11/15/2023    MCV 76 (L) 11/15/2023     11/15/2023     No results for input(s): "TSH", "FREET4" in the last 168 hours.  Lab Results   Component Value Date    HGBA1C 8.9 (H) 2023       Nutritional status:   Body mass index is 23.39 kg/m².  Lab Results   Component Value Date    ALBUMIN 3.0 (L) 11/15/2023    ALBUMIN 3.0 (L) 11/15/2023    ALBUMIN 2.9 (L) 2023     Lab Results   Component Value Date    PREALBUMIN 21 11/15/2023    PREALBUMIN 17 (L) 2023    PREALBUMIN 15 (L) 2023       Estimated Creatinine Clearance: 92.1 mL/min (based on SCr of 1.3 mg/dL).    Accu-Checks  Recent Labs     23  1141 23  1652 23  1954 23  0822 23  1047 23  1214 23  1626 23  2101 11/15/23  0738 11/15/23  1145   POCTGLUCOSE 125* 184* 336* 269* 157* 254* 151* 242* 149* 142*       Current Medications and/or Treatments Impacting Glycemic " Control  Immunotherapy:    Immunosuppressants       None          Steroids:   Hormones (From admission, onward)      None          Pressors:    Autonomic Drugs (From admission, onward)      None          Hyperglycemia/Diabetes Medications:   Antihyperglycemics (From admission, onward)      Start     Stop Route Frequency Ordered    11/14/23 1200  insulin aspart U-100 pen 10 Units         -- SubQ 4 times daily with meals & nightly 11/14/23 0847    11/14/23 0729  insulin aspart U-100 pen 0-10 Units         -- SubQ Before meals & nightly PRN 11/14/23 0729    11/13/23 1100  insulin detemir U-100 (Levemir) pen 18 Units         -- SubQ 2 times daily 11/13/23 1055

## 2023-11-15 NOTE — PROGRESS NOTES
Okay to skip midnight vitals and check in the AM per Anita ROSEN. Patient is a do not disturb from 11-5am. Will check vitals in AM.

## 2023-11-15 NOTE — ASSESSMENT & PLAN NOTE
Procedure: Device Interrogation Including analysis of device parameters  Current Settings: Ventricular Assist Device  Review of device function is stable/unstable         11/15/2023    11:10 AM 11/15/2023     7:34 AM 11/15/2023     5:24 AM 11/14/2023     8:55 PM 11/14/2023     4:46 PM 11/14/2023    12:00 PM 11/14/2023     8:18 AM   TXP LVAD INTERROGATIONS   Type  HeartMate3 HeartMate3 HeartMate3 HeartMate3 HeartMate3 HeartMate3   Flow 4.1 4.2 4.2 4.1 4.3 4.3 4.4   Speed 5150 5150 5100 5100 5150 5100 5100   PI 5.2 5.8 5.5 4.9 4.7 4.9 4.3   Power (Serna) 3.6 3.6 3.7 3.6 3.6 3.7 3.5   LSL     4750 4700 4700   Pulsatility   Intermittent pulse Intermittent pulse Intermittent pulse  Intermittent pulse

## 2023-11-15 NOTE — SUBJECTIVE & OBJECTIVE
Interval History: Clean pressure dressing overlying procedure site. No obvious drainage, erythema, nor swelling.    Review of Systems   Constitutional: Negative for chills and fever.   Cardiovascular:  Negative for chest pain, orthopnea and palpitations.   Respiratory:  Negative for cough and shortness of breath.    Gastrointestinal:  Negative for abdominal pain.   Neurological:  Negative for dizziness, headaches and light-headedness.   Psychiatric/Behavioral:  Negative for altered mental status.      Objective:     Vital Signs (Most Recent):  Temp: 98 °F (36.7 °C) (11/15/23 0716)  Pulse: 106 (11/15/23 0523)  Resp: 18 (11/15/23 0523)  BP: (!) 82/0 (11/15/23 0731)  SpO2: 95 % (11/14/23 1627) Vital Signs (24h Range):  Temp:  [98 °F (36.7 °C)-98.4 °F (36.9 °C)] 98 °F (36.7 °C)  Pulse:  [] 106  Resp:  [18] 18  SpO2:  [95 %-100 %] 95 %  BP: (78-82)/(0) 82/0     Weight: 84.9 kg (187 lb 2.7 oz)  Body mass index is 23.39 kg/m².     SpO2: 95 %        Physical Exam  Vitals reviewed.   Constitutional:       General: He is not in acute distress.     Appearance: Normal appearance. He is not toxic-appearing.   HENT:      Head: Normocephalic and atraumatic.      Mouth/Throat:      Mouth: Mucous membranes are moist.   Cardiovascular:      Rate and Rhythm: Normal rate and regular rhythm.      Heart sounds: No murmur heard.     No friction rub. No gallop.      Comments: VAD hum+  Pulmonary:      Effort: Pulmonary effort is normal. No respiratory distress.      Breath sounds: Normal breath sounds.   Abdominal:      Palpations: Abdomen is soft.      Tenderness: There is no abdominal tenderness.   Musculoskeletal:      Right lower leg: No edema.      Left lower leg: No edema.   Skin:     General: Skin is warm.   Neurological:      Mental Status: He is alert. Mental status is at baseline.        Significant Labs: EP:   Recent Labs   Lab 11/14/23  0438 11/15/23  0544    133*   K 4.3 3.9    98   CO2 28 28   * 192*    BUN 12 19   CREATININE 1.2 1.3   CALCIUM 9.2 9.2   PROT 7.0 7.4  7.4   ALBUMIN 2.9* 3.0*  3.0*   BILITOT 0.8 0.8  0.8   ALKPHOS 131 157*  157*   AST 25 38  38   ALT 17 29  29   ANIONGAP 8 7*   WBC 10.27 10.55   HGB 7.9* 8.2*   HCT 25.1* 26.5*    381   INR 1.1 1.3*         Significant Imaging: Echocardiogram: Transthoracic echo (TTE) complete (Cupid Only):   Results for orders placed or performed during the hospital encounter of 08/31/23   Echo   Result Value Ref Range    Ascending aorta 2.99 cm    STJ 2.54 cm    IVS 0.75 0.6 - 1.1 cm    LA size 2.80 cm    Left Atrium Major Axis 6.27 cm    Left Atrium Minor Axis 5.13 cm    LVIDd 7.11 (A) 3.5 - 6.0 cm    LVIDs 7.01 (A) 2.1 - 4.0 cm    LVOT diameter 2.28 cm    Posterior Wall 0.57 (A) 0.6 - 1.1 cm    MV Peak A Carloz 0.61 m/s    E wave deceleration time 113.67 msec    MV Peak E Carloz 0.64 m/s    RA Major Axis 5.87 cm    RA Width 4.79 cm    RVDD 6.66 cm    Sinus 3.04 cm    TAPSE 1.16 cm    TR Max Carloz 2.72 m/s    TDI LATERAL 0.05 m/s    TDI SEPTAL 0.04 m/s    LA WIDTH 4.09 cm    MV stenosis pressure 1/2 time 32.97 ms    LV Diastolic Volume 264.44 mL    LV Systolic Volume 256.33 mL    RV S' 7.57 cm/s    MV mean gradient 1 mmHg    MV peak gradient 3 mmHg    MV VTI 17.13 cm    LV LATERAL E/E' RATIO 12.80 m/s    LV SEPTAL E/E' RATIO 16.00 m/s    FS 1 %    LA volume 54.93 cm3    LV mass 199.99 g    ZLVIDD -0.40     ZLVIDS 3.51     Left Ventricle Relative Wall Thickness 0.16 cm    MV valve area p 1/2 method 6.67 cm2    E/A ratio 1.05     Mean e' 0.05 m/s    LVOT area 4.1 cm2    E/E' ratio 14.22 m/s    LV Systolic Volume Index 117.0 mL/m2    LV Diastolic Volume Index 120.75 mL/m2    LA Volume Index 25.1 mL/m2    LV Mass Index 91 g/m2    Triscuspid Valve Regurgitation Peak Gradient 30 mmHg    BSA 2.18 m2    TV resting pulmonary artery pressure 38 mmHg    RV TB RVSP 11 mmHg    Est. RA pres 8 mmHg    Narrative      LVAD: The aortic valve does not open. The ventricular  septum is at   midline.HM3 5100    Left Ventricle: The left ventricle is severely dilated. Ventricular   mass is normal. Normal wall thickness. Normal wall motion. There is   severely reduced systolic function with a visually estimated ejection   fraction of 10 -15%. There is normal diastolic function.    Right Ventricle: Normal right ventricular cavity size. Wall thickness   is normal. Right ventricle wall motion  is normal. Systolic function is   normal.    Mitral Valve: There is mild regurgitation.    Tricuspid Valve: There is moderate to severe regurgitation.    Pulmonary Artery: The estimated pulmonary artery systolic pressure is   38 mmHg.    IVC/SVC: Intermediate venous pressure at 8 mmHg.

## 2023-11-15 NOTE — ASSESSMENT & PLAN NOTE
Endocrinology consulted for BG management.   BG goal 140-180     - Levemir 18 units BID   - Novolog (aspart) insulin 10 Units SQ TIDWM and prn for BG excursions MDC SSI (150/25).  - BG checks AC/HS   - Hypoglycemia protocol in place    ** Please notify Endocrine for any change and/or advance in diet**  ** Please call Endocrine for any BG related issues **    Discharge Planning:   Discharge Planning:   - Levemir 18 units QD (Eat a snack before bed if BG is < 100 mg/dl)  - Novolog 10 units TIDWM (Hold if BG < 100 mg/dL)  - Novolog SSI for BG excursions:  Add correction scale if needed.  Blood sugar 150 to 200 add 2 units  Blood sugar 201 to 250 add 4 units  Blood sugar 251 to 300 add 6 units  Blood sugar 301 to 350 add 8 units  Blood sugar greater than 350 add 10 units  - Will need Insurance approved diabetes testing supplies to check blood sugar 4 times a day (Before meals and at bedtime) and as needed.  - Send BG logs in 4 days  - Follow-up in discharge clinic    Education:  Discharge Teaching:    Reviewed topics related to DM including: the need for insulin, how insulin works, what makes it a high risk medication, the importance of immediate follow up with either PCP or endocrine provider, importance of and how to check BG, how to record BG on logs, how to administer insulin, appropriate insulin administration sites, importance of rotating injection sites, hyper/hypoglycemia, how and when to treat hypoglycemia, when to hold insulin, how the correction scale works and when to seek medical attention.  Patient verbalized understanding, answered all questions to patient's satisfaction. Blood sugar logs given to patient.

## 2023-11-15 NOTE — SUBJECTIVE & OBJECTIVE
Interval History: No complaints or overnight events. INR is 1.3 but not bridging with Heparin given recent development of hematoma at ICD explant site. Wants to discharge home today once he gets checked off on ICD dressing change and if transportation can be arranged. He will f/u with Dr. Baldwin in ID 11/29 and in our VAD clinic likely same day    Continuous Infusions:   sodium chloride 0.9% Stopped (11/11/23 1021)    milrinone 20mg/100ml D5W (200mcg/ml) 0.25 mcg/kg/min (11/15/23 1105)     Scheduled Meds:   aspirin  81 mg Oral Daily    atorvastatin  40 mg Oral Daily    DAPTOmycin (CUBICIN) IV (PEDS and ADULTS)  8 mg/kg Intravenous Q24H    furosemide  80 mg Oral Daily    insulin aspart U-100  10 Units Subcutaneous QID (WM & HS)    insulin detemir U-100  18 Units Subcutaneous BID    levETIRAcetam  1,000 mg Oral BID    magnesium oxide  400 mg Oral BID    methocarbamoL  1,000 mg Oral QID    mirtazapine  15 mg Oral Nightly    pantoprazole  40 mg Oral Daily    polyethylene glycol  17 g Oral Daily    senna-docusate 8.6-50 mg  2 tablet Oral BID    sodium chloride 0.9%  10 mL Intravenous Q6H    spironolactone  25 mg Oral Daily    warfarin  7.5 mg Oral Daily     PRN Meds:acetaminophen, dextrose 10%, dextrose 10%, dextrose 10%, dextrose 10%, dextrose, dextrose, glucagon (human recombinant), glucose, influenza, insulin aspart U-100, sodium chloride 0.9%, [place] PICC/Midline Protocol **AND** sodium chloride 0.9% **AND** sodium chloride 0.9%    Review of patient's allergies indicates:   Allergen Reactions    Bumex [bumetanide] Hives    Torsemide Hives     Objective:     Vital Signs (Most Recent):  Temp: 97.9 °F (36.6 °C) (11/15/23 1144)  Pulse: (!) 118 (11/15/23 1045)  Resp: 18 (11/15/23 0523)  BP: (!) 78/0 (11/15/23 1100)  SpO2: 95 % (11/14/23 1627) Vital Signs (24h Range):  Temp:  [97.9 °F (36.6 °C)-98.4 °F (36.9 °C)] 97.9 °F (36.6 °C)  Pulse:  [] 118  Resp:  [18] 18  SpO2:  [95 %] 95 %  BP: (78-82)/(0) 78/0      Patient Vitals for the past 72 hrs (Last 3 readings):   Weight   11/14/23 0414 84.9 kg (187 lb 2.7 oz)     Body mass index is 23.39 kg/m².      Intake/Output Summary (Last 24 hours) at 11/15/2023 1213  Last data filed at 11/15/2023 0900  Gross per 24 hour   Intake 922 ml   Output 800 ml   Net 122 ml       Hemodynamic Parameters:  CVP:  [7 mmHg] 7 mmHg    Telemetry: ST       Physical Exam  Constitutional:       Appearance: Normal appearance.   HENT:      Head: Normocephalic and atraumatic.   Eyes:      Conjunctiva/sclera: Conjunctivae normal.      Pupils: Pupils are equal, round, and reactive to light.   Neck:      Comments: Do not appreciate elevated JVP  Pulmonary:      Effort: Pulmonary effort is normal.      Breath sounds: Normal breath sounds.   Abdominal:      General: Bowel sounds are normal.      Palpations: Abdomen is soft.   Musculoskeletal:         General: No swelling. Normal range of motion.      Cervical back: Normal range of motion and neck supple.   Skin:     General: Skin is warm and dry.      Capillary Refill: Capillary refill takes 2 to 3 seconds.   Neurological:      General: No focal deficit present.      Mental Status: He is alert and oriented to person, place, and time.   Psychiatric:         Mood and Affect: Mood normal.         Behavior: Behavior normal.         Thought Content: Thought content normal.         Judgment: Judgment normal.            Significant Labs:  CBC:  Recent Labs   Lab 11/13/23  0407 11/14/23  0438 11/15/23  0544   WBC 12.26 10.27 10.55   RBC 3.42* 3.29* 3.48*   HGB 8.1* 7.9* 8.2*   HCT 26.0* 25.1* 26.5*    346 381   MCV 76* 76* 76*   MCH 23.7* 24.0* 23.6*   MCHC 31.2* 31.5* 30.9*     BNP:  Recent Labs   Lab 11/08/23  1407 11/13/23  0407 11/15/23  0544   * 116* 67     CMP:  Recent Labs   Lab 11/13/23  0407 11/14/23  0438 11/15/23  0544   * 157* 192*   CALCIUM 9.1 9.2 9.2   ALBUMIN 2.9*  2.9* 2.9* 3.0*  3.0*   PROT 7.1  7.1 7.0 7.4  7.4   NA  136 136 133*   K 4.3 4.3 3.9   CO2 28 28 28   CL 98 100 98   BUN 13 12 19   CREATININE 1.2 1.2 1.3   ALKPHOS 131  131 131 157*  157*   ALT 15  15 17 29  29   AST 25  25 25 38  38   BILITOT 0.8  0.8 0.8 0.8  0.8      Coagulation:   Recent Labs   Lab 11/13/23  0407 11/14/23  0438 11/15/23  0544   INR 1.1 1.1 1.3*   APTT 22.7 24.2 25.2     LDH:  Recent Labs   Lab 11/13/23  0407 11/14/23  0438 11/15/23  0544   * 236 253     Microbiology:  Microbiology Results (last 7 days)       Procedure Component Value Units Date/Time    Blood culture [[id number]] Collected: 11/10/23 0403    Order Status: Completed Specimen: Blood from Peripheral, Wrist, Right Updated: 11/15/23 0612     Blood Culture, Routine No growth after 5 days.    Blood culture [[id number]] Collected: 11/10/23 0400    Order Status: Completed Specimen: Blood from Peripheral, Wrist, Left Updated: 11/15/23 0612     Blood Culture, Routine No growth after 5 days.    Blood culture x two cultures. Draw prior to antibiotics. [[id number]] Collected: 11/08/23 1407    Order Status: Completed Specimen: Blood from Peripheral, Lower Arm, Left Updated: 11/13/23 1612     Blood Culture, Routine No growth after 5 days.    Narrative:      Aerobic and anaerobic    Blood culture x two cultures. Draw prior to antibiotics. [[id number]] Collected: 11/08/23 1408    Order Status: Completed Specimen: Blood from Peripheral, Antecubital, Left Updated: 11/13/23 1612     Blood Culture, Routine No growth after 5 days.    Narrative:      Aerobic and anaerobic    Aerobic culture [[id number]]  (Abnormal)  (Susceptibility) Collected: 11/09/23 1400    Order Status: Completed Specimen: Incision site from Chest, Left Updated: 11/13/23 1551     Aerobic Bacterial Culture STAPHYLOCOCCUS EPIDERMIDIS  Rare      Narrative:      Deep pocket #2    Culture, Anaerobe [[id number]] Collected: 11/09/23 1430    Order Status: Completed Specimen: Incision site from Chest, Left Updated: 11/13/23 1350      Anaerobic Culture No anaerobes isolated    Narrative:      Lead tip    Culture, Anaerobe [[id number]] Collected: 11/09/23 1415    Order Status: Completed Specimen: Incision site from Chest, Left Updated: 11/13/23 1349     Anaerobic Culture No anaerobes isolated    Narrative:      Superior pocket #1    Culture, Anaerobe [[id number]] Collected: 11/09/23 1400    Order Status: Completed Specimen: Incision site from Chest, Left Updated: 11/13/23 1349     Anaerobic Culture No anaerobes isolated    Narrative:      Deep pocket #2    Culture, Anaerobe [[id number]] Collected: 11/09/23 1400    Order Status: Completed Specimen: Incision site from Chest, Left Updated: 11/13/23 1348     Anaerobic Culture No anaerobes isolated    Narrative:      Deep pocket #1    Culture, Anaerobe [[id number]] Collected: 11/09/23 1400    Order Status: Completed Specimen: Incision site from Chest, Left Updated: 11/13/23 1347     Anaerobic Culture No anaerobes isolated    Narrative:      Superficial pocket #2    Culture, Anaerobe [[id number]] Collected: 11/09/23 1400    Order Status: Completed Specimen: Incision site from Chest, Left Updated: 11/13/23 1346     Anaerobic Culture No anaerobes isolated    Narrative:      Superficial pocket #1    Aerobic culture [[id number]]  (Abnormal)  (Susceptibility) Collected: 11/09/23 1400    Order Status: Completed Specimen: Incision site from Chest, Left Updated: 11/13/23 0950     Aerobic Bacterial Culture STAPHYLOCOCCUS AUREUS  Rare      Narrative:      Superficial pocket #1    Aerobic culture [[id number]]  (Abnormal)  (Susceptibility) Collected: 11/09/23 1415    Order Status: Completed Specimen: Incision site from Chest, Left Updated: 11/13/23 0949     Aerobic Bacterial Culture STAPHYLOCOCCUS AUREUS  Rare  Skin ramesh also present      Narrative:      Superior pocket #1    Aerobic culture [[id number]]  (Abnormal)  (Susceptibility) Collected: 11/09/23 1400    Order Status: Completed Specimen: Incision site from  Chest, Left Updated: 11/13/23 0945     Aerobic Bacterial Culture STAPHYLOCOCCUS AUREUS  Rare        STAPHYLOCOCCUS EPIDERMIDIS  Rare      Narrative:      Deep pocket #1    Aerobic culture [[id number]]  (Abnormal)  (Susceptibility) Collected: 11/09/23 1400    Order Status: Completed Specimen: Incision site from Chest, Left Updated: 11/13/23 0944     Aerobic Bacterial Culture STAPHYLOCOCCUS AUREUS  Rare  Skin ramesh also present      Narrative:      Superficial pocket #2    Aerobic culture [[id number]] Collected: 11/09/23 1430    Order Status: Completed Specimen: Incision site from Chest, Left Updated: 11/13/23 0840     Aerobic Bacterial Culture No growth    Narrative:      Lead tip            I have reviewed all pertinent labs within the past 24 hours.    Estimated Creatinine Clearance: 92.1 mL/min (based on SCr of 1.3 mg/dL).    Diagnostic Results:  I have reviewed and interpreted all pertinent imaging results/findings within the past 24 hours.

## 2023-11-15 NOTE — PROGRESS NOTES
11/15/2023  John Alaniz    Current provider:  Dalia Crum MD    Device interrogation:      11/15/2023    11:10 AM 11/15/2023     7:34 AM 11/15/2023     5:24 AM 11/14/2023     8:55 PM 11/14/2023     4:46 PM 11/14/2023    12:00 PM 11/14/2023     8:18 AM   TXP LVAD INTERROGATIONS   Type  HeartMate3 HeartMate3 HeartMate3 HeartMate3 HeartMate3 HeartMate3   Flow 4.1 4.2 4.2 4.1 4.3 4.3 4.4   Speed 5150 5150 5100 5100 5150 5100 5100   PI 5.2 5.8 5.5 4.9 4.7 4.9 4.3   Power (Serna) 3.6 3.6 3.7 3.6 3.6 3.7 3.5   LSL  4750   4750 4700 4700   Pulsatility  Intermittent pulse Intermittent pulse Intermittent pulse Intermittent pulse  Intermittent pulse          Rounded on Kevan Queen to ensure all mechanical assist device settings (IABP or VAD) were appropriate and all parameters were within limits.  I was able to ensure all back up equipment was present, the staff had no issues, and the Perfusion Department daily rounding was complete.      For implantable VADs: Interrogation of Ventricular assist device was performed with analysis of device parameters and review of device function. I have personally reviewed the interrogation findings and agree with findings as stated.     In emergency, the nursing units have been notified to contact the perfusion department either by:  Calling [phone] from 630am to 4pm Mon thru Fri, utilizing the On-Call Finder functionality of Epic and searching for Perfusion, or by contacting the hospital  from 4pm to 630am and on weekends and asking to speak with the perfusionist on call.    3:24 PM

## 2023-11-15 NOTE — PROGRESS NOTES
"[facility] - [facility] Step[facility]  Endocrinology  Progress Note    Admit Date: 2023     Reason for Consult: Management of T2DM, Hyperglycemia      Surgical Procedure and Date: LVAD 2022     Diabetes diagnosis year:      Home Diabetes Medications:   -Levemir 8 units BID.   -Novolog 6 units TIDWM in addition to the following correction scale:     150 - 200 + 1 unit     201 - 250 + 2 units     251 - 300 + 3 units     301 - 350 + 4 units      > 350   + 5 units     How often checking glucose at home?  > 4 times per day  BG readings on regimen: 180's-200's  Hypoglycemia on the regimen?  No  Missed doses on regimen?  occasionally skips mealsn and will skip Novolog with breakfast      Diabetes Complications include:     Hyperglycemia     Complicating diabetes co morbidities:   History of MI, CHF, and CKD        HPI: Mr. Queen is a 39yo male with HF (EF 10-15%), s/p LVAD placement with HeartMate III, ICD placement 2022, DM2, epilepsy who presented as his ICD began to erode through his skin and was exposed. BG excursions noted in the 400's yesterday evening. Endocrine consulted to manage hyperglycemia and type 2 diabetes.     Lab Results   Component Value Date    HGBA1C 6.9 (H) 2023         Interval HPI:   No acute events overnight. Patient in room 309/309 A. Blood glucose stable. BG at and above goal on current insulin regimen (SSI, prandial, and basal insulin ). Steroid use- None.   6 Days Post-Op  Renal function- Normal   Vasopressors-  None     Diet diabetic Cardiac (Low Na/Chol), Double Portions; 2000 Calorie; Fluid - 2000mL; Standard Tray     Eatin%  Nausea: No  Hypoglycemia and intervention: No  Fever: No  TPN and/or TF: No    BP (!) 78/0 (BP Location: Left arm, Patient Position: Lying)   Pulse (!) 118   Temp 97.9 °F (36.6 °C) (Oral)   Resp 18   Ht 6' 3" (1.905 m)   Wt 84.9 kg (187 lb 2.7 oz)   SpO2 95%   BMI 23.39 kg/m²     Labs Reviewed and Include    Recent Labs   Lab " "11/15/23  0544   *   CALCIUM 9.2   ALBUMIN 3.0*  3.0*   PROT 7.4  7.4   *   K 3.9   CO2 28   CL 98   BUN 19   CREATININE 1.3   ALKPHOS 157*  157*   ALT 29  29   AST 38  38   BILITOT 0.8  0.8     Lab Results   Component Value Date    WBC 10.55 11/15/2023    HGB 8.2 (L) 11/15/2023    HCT 26.5 (L) 11/15/2023    MCV 76 (L) 11/15/2023     11/15/2023     No results for input(s): "TSH", "FREET4" in the last 168 hours.  Lab Results   Component Value Date    HGBA1C 8.9 (H) 11/13/2023       Nutritional status:   Body mass index is 23.39 kg/m².  Lab Results   Component Value Date    ALBUMIN 3.0 (L) 11/15/2023    ALBUMIN 3.0 (L) 11/15/2023    ALBUMIN 2.9 (L) 11/14/2023     Lab Results   Component Value Date    PREALBUMIN 21 11/15/2023    PREALBUMIN 17 (L) 11/13/2023    PREALBUMIN 15 (L) 08/07/2023       Estimated Creatinine Clearance: 92.1 mL/min (based on SCr of 1.3 mg/dL).    Accu-Checks  Recent Labs     11/13/23  1141 11/13/23  1652 11/13/23  1954 11/14/23  0822 11/14/23  1047 11/14/23  1214 11/14/23  1626 11/14/23  2101 11/15/23  0738 11/15/23  1145   POCTGLUCOSE 125* 184* 336* 269* 157* 254* 151* 242* 149* 142*       Current Medications and/or Treatments Impacting Glycemic Control  Immunotherapy:    Immunosuppressants       None          Steroids:   Hormones (From admission, onward)      None          Pressors:    Autonomic Drugs (From admission, onward)      None          Hyperglycemia/Diabetes Medications:   Antihyperglycemics (From admission, onward)      Start     Stop Route Frequency Ordered    11/14/23 1200  insulin aspart U-100 pen 10 Units         -- SubQ 4 times daily with meals & nightly 11/14/23 0847    11/14/23 0729  insulin aspart U-100 pen 0-10 Units         -- SubQ Before meals & nightly PRN 11/14/23 0729    11/13/23 1100  insulin detemir U-100 (Levemir) pen 18 Units         -- SubQ 2 times daily 11/13/23 1055            ASSESSMENT and PLAN    Cardiac/Vascular  * ICD (implantable " cardioverter-defibrillator) malfunction  Managed per primary team  Avoid hypoglycemia          LVAD (left ventricular assist device) present  Managed per primary team  Avoid hypoglycemia        Endocrine  Type 2 diabetes mellitus with hyperglycemia  Endocrinology consulted for BG management.   BG goal 140-180     - Levemir 18 units BID   - Novolog (aspart) insulin 10 Units SQ TIDWM and prn for BG excursions [facility] SSI (150/25).  - BG checks AC/HS   - Hypoglycemia protocol in place    ** Please notify Endocrine for any change and/or advance in diet**  ** Please call Endocrine for any BG related issues **    Discharge Planning:   Discharge Planning:   - Levemir 18 units QD (Eat a snack before bed if BG is < 100 mg/dl)  - Novolog 10 units TIDWM (Hold if BG < 100 mg/dL)  - Novolog SSI for BG excursions:  Add correction scale if needed.  Blood sugar 150 to 200 add 2 units  Blood sugar 201 to 250 add 4 units  Blood sugar 251 to 300 add 6 units  Blood sugar 301 to 350 add 8 units  Blood sugar greater than 350 add 10 units  - Will need Insurance approved diabetes testing supplies to check blood sugar 4 times a day (Before meals and at bedtime) and as needed.  - Send BG logs in 4 days  - Follow-up in discharge clinic    Education:  Discharge Teaching:    Reviewed topics related to DM including: the need for insulin, how insulin works, what makes it a high risk medication, the importance of immediate follow up with either PCP or endocrine provider, importance of and how to check BG, how to record BG on logs, how to administer insulin, appropriate insulin administration sites, importance of rotating injection sites, hyper/hypoglycemia, how and when to treat hypoglycemia, when to hold insulin, how the correction scale works and when to seek medical attention.  Patient verbalized understanding, answered all questions to patient's satisfaction. Blood sugar logs given to patient.               Susanna Doty,  BHASKAR  Endocrinology  [facility] Step[facility]

## 2023-11-15 NOTE — ASSESSMENT & PLAN NOTE
-Patient presented with [employer] Mobile AICD falling out of L chest wall. Apparently had been this way for 1 week  -Unclear reason for dehiscence of ICD site  -BCx's -ve.  ICD pocket cxs +ve for MSSA and MRSE. D/C home on 2 week course of IV Daptomycin with end date 11/23. Weekly CBC, CMP and CPK will be drawn on Mondays by home health and faxed to ID Clinic while he is on Daptomycin  -s/p device extraction complicated by hematoma so wound vac removed. Hemostasis achieved with general surgery help.  -Orders for BID wet to dry dressing. Patient now feels comfortable doing the dressing changes himself  -Lifevest ordered   -D/C home with home health to f/u with Dr. Baldwin in ID clinic 11/29. Will have him f/u in our VAD clinic same day if possible

## 2023-11-15 NOTE — PLAN OF CARE
Problem: Adult Inpatient Plan of Care  Goal: Plan of Care Review  Outcome: Ongoing, Progressing  Goal: Absence of Hospital-Acquired Illness or Injury  Outcome: Ongoing, Progressing  Goal: Optimal Comfort and Wellbeing  Outcome: Ongoing, Progressing  Goal: Readiness for Transition of Care  Outcome: Ongoing, Progressing     Problem: Diabetes Comorbidity  Goal: Blood Glucose Level Within Targeted Range  Outcome: Ongoing, Progressing     Problem: Infection  Goal: Absence of Infection Signs and Symptoms  Outcome: Ongoing, Progressing     Problem: Impaired Wound Healing  Goal: Optimal Wound Healing  Outcome: Ongoing, Progressing     Problem: Fall Injury Risk  Goal: Absence of Fall and Fall-Related Injury  Outcome: Ongoing, Progressing     Problem: Skin Injury Risk Increased  Goal: Skin Health and Integrity  Outcome: Ongoing, Progressing     Problem: Coping Ineffective  Goal: Effective Coping  Outcome: Ongoing, Progressing    Pt AAOx4. Pt ambulating independently; fall precautions in place. Pt on room air. LVAD and dopplers WNL; smooth LVAD hum. Glucose monitored. Plan of care discussed with Pt; Pt has no questions at this time. Plan of care continues.

## 2023-11-15 NOTE — PROGRESS NOTES
DISCHARGE NOTE:    Kevan Queen is a 38 y.o. male s/p HM3 admitted for ICD coming out of L chest wall. The device was extracted and a hematoma formed. For now, he cannot be bridged with heparin, but would subsequently be a candidate once wound heals due to high stroke risk.     Pharmacy Interventions/Recommendations:  1) INR Goal: 2-3    2) Antiplatelet Agents: ASA 81mg/day     3) Heparin Bridging:  Not for now    4) INR Follow-Up/Discharge Needs:  Repeat INR Monday    See list of discharge medication for dosing instructions.     Kevan Queen and his caregiver verbalized their understanding and had the opportunity to ask questions.       Medication List        START taking these medications      sodium chloride 0.9% SolP 50 mL with DAPTOmycin 500 mg SolR 688 mg  Inject 688 mg into the vein once daily.            CHANGE how you take these medications      insulin aspart U-100 100 unit/mL (3 mL) Inpn pen  Commonly known as: NovoLOG  Inject 10 Units into the skin 3 (three) times daily with meals. Plus Sliding Scale: 151-200: +1, 201-250: +2, 251-300: +3, 301-350: +4 and call MD  What changed: how much to take     insulin detemir U-100 (Levemir) 100 unit/mL (3 mL) Inpn pen  Inject 18 Units into the skin once daily.  What changed:   how much to take  when to take this     warfarin 3 MG tablet  Commonly known as: COUMADIN  Take 1.5 tablets (4.5mg) orally daily, except 2 tablets (6mg) on Wednesdays and Saturdays  What changed:   how much to take  how to take this  when to take this            CONTINUE taking these medications      acetaminophen 325 MG tablet  Commonly known as: TYLENOL  Take 2 tablets (650 mg total) by mouth every 6 (six) hours as needed for Pain.     aspirin 81 MG EC tablet  Commonly known as: ECOTRIN  Take 1 tablet (81 mg total) by mouth once daily.     atorvastatin 40 MG tablet  Commonly known as: LIPITOR  Take 1 tablet (40 mg total) by mouth once daily.     blood sugar diagnostic Strp  Use to test  "blood glucose 4 (four) times daily.     furosemide 80 MG tablet  Commonly known as: LASIX  Take 1 tablet (80 mg total) by mouth once daily.     lancets 33 gauge Misc  Use to test blood glucose 4 (four) times daily.     levETIRAcetam 1000 MG tablet  Commonly known as: KEPPRA  TAKE 1 TABLET(1000 MG) BY MOUTH TWICE DAILY     magnesium oxide 400 mg (241.3 mg magnesium) tablet  Commonly known as: MAG-OX  Take 1 tablet (400 mg total) by mouth once daily.     milrinone 20mg/100ml D5W (200mcg/ml) 20 mg/100 mL (200 mcg/mL) infusion  Commonly known as: PRIMACOR  Inject 23.6 mcg/min into the vein continuous.     mirtazapine 15 MG tablet  Commonly known as: REMERON  Take 1 tablet (15 mg total) by mouth nightly.     pantoprazole 40 MG tablet  Commonly known as: PROTONIX  Take 1 tablet (40 mg total) by mouth once daily.     pen needle, diabetic 32 gauge x 5/32" Ndle  Use to inject insulin 5 (five) times daily.     polyethylene glycol 17 gram Pwpk  Commonly known as: GLYCOLAX  Take 17 g by mouth once daily.     spironolactone 25 MG tablet  Commonly known as: ALDACTONE  Take 1 tablet (25 mg total) by mouth once daily.     TRUE METRIX GLUCOSE METER Misc  Generic drug: blood-glucose meter  Use as instructed            STOP taking these medications      busPIRone 10 MG tablet  Commonly known as: BUSPAR     cefadroxil 500 MG Cap  Commonly known as: DURICEF     enoxaparin 40 mg/0.4 mL Syrg  Commonly known as: LOVENOX     senna 8.6 mg tablet  Commonly known as: SENOKOT     valsartan 40 MG tablet  Commonly known as: DIOVAN               Where to Get Your Medications        These medications were sent to [facility] [address] [address]      Hours: Mon-Fri 7a-7p, Sat-Sun 10a-4p Phone: [phone]   blood sugar diagnostic Strp  insulin aspart U-100 100 unit/mL (3 mL) Inpn pen  insulin detemir U-100 (Levemir) 100 unit/mL (3 mL) Inpn pen  lancets 33 gauge Misc  pen needle, diabetic 32 gauge x 5/32" " Ndle       Information about where to get these medications is not yet available    Ask your nurse or doctor about these medications  sodium chloride 0.9% SolP 50 mL with DAPTOmycin 500 mg SolR 688 mg  warfarin 3 MG tablet

## 2023-11-15 NOTE — ASSESSMENT & PLAN NOTE
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 Patient was admitted for concerns of infected scICD. [place] scientific scICD (10/2020 at [facility]) protruding from L chest wall on admit. Underwent extraction on 11/9/2023 complicated with hematoma/ bleeding with wound vac removal. General surgery did suture ligation of bleeding vessels and application of hemostatic agents. Extracted device is growing MRSA on aerobic cultures 11/11/2023.   - ID consulted, outpatient daptomycin  - Device Interrogation shows no VT shocks prior. Would not recommend lifevest as patient has had no prior VT events, and would not serve as a bridge to ICD reimplantation. Discharge with a lifevest may be more likely to cause inappropriate shocks.  -Patient to follow up with wound care clinic for his extraction site care.    Thank you for this consult, EP signing off.

## 2023-11-15 NOTE — PLAN OF CARE
Patient is ready for discharge. Patient stable alert and oriented. No complaints of pain. Discussed discharge plan. Reviewed medications, appointments, and answered questions with patient. Medications sent to bedside. PT. Went home on continuous Milrinone per order.All LVAD equipment accounted for and sent home with patient. Educated patient on dressing change to Left Clavicle prior to discharge. Dressing was changed and patient checked off on dressing change.

## 2023-11-16 ENCOUNTER — PATIENT MESSAGE (OUTPATIENT)
Dept: ENDOCRINOLOGY | Facility: HOSPITAL | Age: 38
End: 2023-11-16
Payer: MEDICAID

## 2023-11-16 NOTE — PROGRESS NOTES
Kevan Queen is a 38 y.o. male s/p HM3 admitted for ICD coming out of L chest wall. The device was extracted and a hematoma formed. For now, he cannot be bridged with heparin, but would subsequently be a candidate once wound heals due to high stroke risk.

## 2023-11-17 NOTE — PROGRESS NOTES
Spoke to Ms. Wright regarding discharge patient's medication dosing. A discharge warfarin dose of 6 mg every Wed/Fri, and 4.5 mg all other days confirmed. INR scheduled for 11/20/23 at [facility].

## 2023-11-21 ENCOUNTER — DOCUMENTATION ONLY (OUTPATIENT)
Dept: TRANSPLANT | Facility: CLINIC | Age: 38
End: 2023-11-21
Payer: MEDICAID

## 2023-11-21 NOTE — PROGRESS NOTES
"Per Coumadin clinic note, patient rescheduled appointment, see below snippet from coumadin clinic encounter.     "11/21/2023 9:20 AM S/W Nurse Jone she reports that  Patient canceled [facility] appt 11/20/23 and r/s to 11/30/23 .  Also Called Darlyn Wright to r/s no answer and unable to lvm ."  "

## 2023-11-21 NOTE — PROGRESS NOTES
11/21/2023 9:20 AM S/W Nurse Jone she reports that  Patient canceled [facility] appt 11/20/23 and r/s to 11/30/23 .  Also Called Darlyn Wright to r/s no answer and unable to lvm .

## 2023-11-24 DIAGNOSIS — Z95.811 LVAD (LEFT VENTRICULAR ASSIST DEVICE) PRESENT: ICD-10-CM

## 2023-11-24 RX ORDER — WARFARIN 3 MG/1
TABLET ORAL
Qty: 60 TABLET | Refills: 5 | Status: SHIPPED | OUTPATIENT
Start: 2023-11-24

## 2023-11-26 ENCOUNTER — TELEPHONE (OUTPATIENT)
Dept: TRANSPLANT | Facility: CLINIC | Age: 38
End: 2023-11-26
Payer: MEDICAID

## 2023-11-26 NOTE — TELEPHONE ENCOUNTER
"Spouse paged, patient took last dose of coumadin tonight, when contacted pharmacy were told he does not have any refills. Reviewed chart, last prescription was on "no print" when patient discharged from hospital. New prescription sent to Dr Marques for signature. Spouse also reports that she is out of dressing supplies for patients chest wall wound. Reviewed supplies that are still available, states she has enough for the weekend. Will need to investigate further next week who is supposed to be providing dressing supplies.  "

## 2023-11-27 ENCOUNTER — TELEPHONE (OUTPATIENT)
Dept: ELECTROPHYSIOLOGY | Facility: CLINIC | Age: 38
End: 2023-11-27
Payer: MEDICAID

## 2023-11-27 ENCOUNTER — TELEPHONE (OUTPATIENT)
Dept: TRANSPLANT | Facility: CLINIC | Age: 38
End: 2023-11-27
Payer: MEDICAID

## 2023-11-27 NOTE — TELEPHONE ENCOUNTER
Following up on phone call to Inessa Friday regarding ICD care and supplies. Sent a message to the inpatient wound care RN and EP asking for assistance on giving them directions and obtaining supplies.  Called [facility] to let them know, but unable to get through on their phones.

## 2023-11-29 ENCOUNTER — OFFICE VISIT (OUTPATIENT)
Dept: INFECTIOUS DISEASES | Facility: CLINIC | Age: 38
End: 2023-11-29
Payer: MEDICAID

## 2023-11-29 DIAGNOSIS — J86.9 EMPYEMA OF LUNG: ICD-10-CM

## 2023-11-29 DIAGNOSIS — T82.7XXA INFECTION INVOLVING IMPLANTABLE CARDIOVERTER-DEFIBRILLATOR (ICD), INITIAL ENCOUNTER: Primary | ICD-10-CM

## 2023-11-29 DIAGNOSIS — R78.81 BACTEREMIA DUE TO STAPHYLOCOCCUS: ICD-10-CM

## 2023-11-29 DIAGNOSIS — T82.7XXA INFECTION ASSOCIATED WITH DRIVELINE OF LEFT VENTRICULAR ASSIST DEVICE (LVAD): ICD-10-CM

## 2023-11-29 DIAGNOSIS — B95.8 BACTEREMIA DUE TO STAPHYLOCOCCUS: ICD-10-CM

## 2023-11-29 PROCEDURE — 3052F HG A1C>EQUAL 8.0%<EQUAL 9.0%: CPT | Mod: CPTII,95,, | Performed by: STUDENT IN AN ORGANIZED HEALTH CARE EDUCATION/TRAINING PROGRAM

## 2023-11-29 PROCEDURE — 3066F PR DOCUMENTATION OF TREATMENT FOR NEPHROPATHY: ICD-10-PCS | Mod: CPTII,95,, | Performed by: STUDENT IN AN ORGANIZED HEALTH CARE EDUCATION/TRAINING PROGRAM

## 2023-11-29 PROCEDURE — 4010F ACE/ARB THERAPY RXD/TAKEN: CPT | Mod: CPTII,95,, | Performed by: STUDENT IN AN ORGANIZED HEALTH CARE EDUCATION/TRAINING PROGRAM

## 2023-11-29 PROCEDURE — 1159F PR MEDICATION LIST DOCUMENTED IN MEDICAL RECORD: ICD-10-PCS | Mod: CPTII,95,, | Performed by: STUDENT IN AN ORGANIZED HEALTH CARE EDUCATION/TRAINING PROGRAM

## 2023-11-29 PROCEDURE — 3066F NEPHROPATHY DOC TX: CPT | Mod: CPTII,95,, | Performed by: STUDENT IN AN ORGANIZED HEALTH CARE EDUCATION/TRAINING PROGRAM

## 2023-11-29 PROCEDURE — 1111F PR DISCHARGE MEDS RECONCILED W/ CURRENT OUTPATIENT MED LIST: ICD-10-PCS | Mod: CPTII,95,, | Performed by: STUDENT IN AN ORGANIZED HEALTH CARE EDUCATION/TRAINING PROGRAM

## 2023-11-29 PROCEDURE — 99214 OFFICE O/P EST MOD 30 MIN: CPT | Mod: 95,,, | Performed by: STUDENT IN AN ORGANIZED HEALTH CARE EDUCATION/TRAINING PROGRAM

## 2023-11-29 PROCEDURE — 99214 PR OFFICE/OUTPT VISIT, EST, LEVL IV, 30-39 MIN: ICD-10-PCS | Mod: 95,,, | Performed by: STUDENT IN AN ORGANIZED HEALTH CARE EDUCATION/TRAINING PROGRAM

## 2023-11-29 PROCEDURE — 1160F PR REVIEW ALL MEDS BY PRESCRIBER/CLIN PHARMACIST DOCUMENTED: ICD-10-PCS | Mod: CPTII,95,, | Performed by: STUDENT IN AN ORGANIZED HEALTH CARE EDUCATION/TRAINING PROGRAM

## 2023-11-29 PROCEDURE — 4010F PR ACE/ARB THEARPY RXD/TAKEN: ICD-10-PCS | Mod: CPTII,95,, | Performed by: STUDENT IN AN ORGANIZED HEALTH CARE EDUCATION/TRAINING PROGRAM

## 2023-11-29 PROCEDURE — 3060F PR POS MICROALBUMINURIA RESULT DOCUMENTED/REVIEW: ICD-10-PCS | Mod: CPTII,95,, | Performed by: STUDENT IN AN ORGANIZED HEALTH CARE EDUCATION/TRAINING PROGRAM

## 2023-11-29 PROCEDURE — 1160F RVW MEDS BY RX/DR IN RCRD: CPT | Mod: CPTII,95,, | Performed by: STUDENT IN AN ORGANIZED HEALTH CARE EDUCATION/TRAINING PROGRAM

## 2023-11-29 PROCEDURE — 3060F POS MICROALBUMINURIA REV: CPT | Mod: CPTII,95,, | Performed by: STUDENT IN AN ORGANIZED HEALTH CARE EDUCATION/TRAINING PROGRAM

## 2023-11-29 PROCEDURE — 3052F PR MOST RECENT HEMOGLOBIN A1C LEVEL 8.0 - < 9.0%: ICD-10-PCS | Mod: CPTII,95,, | Performed by: STUDENT IN AN ORGANIZED HEALTH CARE EDUCATION/TRAINING PROGRAM

## 2023-11-29 PROCEDURE — 1159F MED LIST DOCD IN RCRD: CPT | Mod: CPTII,95,, | Performed by: STUDENT IN AN ORGANIZED HEALTH CARE EDUCATION/TRAINING PROGRAM

## 2023-11-29 PROCEDURE — 1111F DSCHRG MED/CURRENT MED MERGE: CPT | Mod: CPTII,95,, | Performed by: STUDENT IN AN ORGANIZED HEALTH CARE EDUCATION/TRAINING PROGRAM

## 2023-11-29 RX ORDER — CEFADROXIL 500 MG/1
500 CAPSULE ORAL EVERY 12 HOURS
Qty: 60 CAPSULE | Refills: 11 | Status: SHIPPED | OUTPATIENT
Start: 2023-11-29 | End: 2024-11-28

## 2023-11-29 NOTE — PROGRESS NOTES
The patient location is: home  The chief complaint leading to consultation is: f/u    Visit type: audiovisual    Face to Face time with patient: 7  37 minutes of total time spent on the encounter, which includes face to face time and non-face to face time preparing to see the patient (eg, review of tests), Obtaining and/or reviewing separately obtained history, Documenting clinical information in the electronic or other health record, Independently interpreting results (not separately reported) and communicating results to the patient/family/caregiver, or Care coordination (not separately reported).         Each patient to whom he or she provides medical services by telemedicine is:  (1) informed of the relationship between the physician and patient and the respective role of any other health care provider with respect to management of the patient; and (2) notified that he or she may decline to receive medical services by telemedicine and may withdraw from such care at any time.    Notes:           INFECTIOUS DISEASE CLINIC  11/29/2023     Subjective:      Chief Complaint:   Chief Complaint   Patient presents with    Follow-up       History of Present Illness:    This is a 38 y.o. male with LVAD c/b prior MSSA DLESI with recent admission for COVID, DLESI and MSSA bacteremia c/b L empyema (maintained on ancef, maria isabel 9/14) who is referred to my clinic for follow up.  Patient known to ID, please see prior notes for full details. Doing well since discharge, reports tolerating ancef without issues. PICC line inadvertently was pulled a couple of weeks ago and went to ER to get it replaced with CXR done at that time with interval improvement in L sided opacity.  Denies fevers, chills, drainage from DLES or diarrhea. Reports breathing is better, still with minimal L sided pleuritic chest pain.     Interval history:  9/15/23: Pt states he is doing well. Tolerating ancef without issues, on last bulb. No fevers, chills, dlesi  drainage, reports minimal L pleuritic CP. Denies issues with PICC. Per chart review, pt was recently admitted for seizure like activity. Denies further seizures, HA or vision changes. Late to our appt - spoke with mother who got in touch with pt to get on virtual.   11/29/23: Doing well since discharged - was sent home on 2w course of dapto - completed without issues. Has PICC in for milrinone, denies problems. No fevers or chills. Reports some bloody drainage from prior ICD site. No drainage over DLES.    ROS      Past Medical History:   Diagnosis Date    Acute respiratory failure with hypoxia 7/22/2023    Arthritis     Awareness alteration, transient 9/1/2022    Cardiomyopathy     CHF (congestive heart failure) 10/01/2020    COVID-19 6/3/2023    Diabetes mellitus     Dilated cardiomyopathy 10/26/2020    Drug abuse 10/2020    Headache 4/19/2023    Hyperglycemia 12/16/2022    Hyperosmolar hyperglycemic state (HHS) 5/25/2022    ICD (implantable cardioverter-defibrillator) in place 10/26/2020    Left ventricular assist device (LVAD) complication, initial encounter 6/5/2023    Muscle cramping 6/15/2022    Renal disorder     SOB (shortness of breath) 6/13/2022    Tingling in extremities 7/13/2022     Past Surgical History:   Procedure Laterality Date    APPLICATION OF WOUND VACUUM-ASSISTED CLOSURE DEVICE N/A 6/30/2022    Procedure: APPLICATION, WOUND VAC;  Surgeon: Luis F Paige MD;  Location: [facility] OR [address] [place] [address];  Service: Cardiovascular;  Laterality: N/A;  50 x 5 cm    CARDIAC DEFIBRILLATOR PLACEMENT      ECHOCARDIOGRAM,TRANSESOPHAGEAL  11/9/2023    Procedure: Transesophageal echo (GUILLERMO) intra-procedure log documentation;  Surgeon: Eron Ivy MD;  Location: [facility] EP LAB;  Service: Cardiology;;    EXTRACTION, ELECTRODE LEAD Left 11/9/2023    Procedure: EXTRACTION, ELECTRODE LEAD;  Surgeon: ADALID Leon MD;  Location: [facility] EP LAB;  Service: Cardiology;  Laterality: Left;  INFECTION, LEAD EXTRACTION, GUILLERMO, BSCI,  ANES, EH,   *NO CTS BACKUP*    IMPLANTATION OF RIGHT VENTRICULAR ASSIST DEVICE (RVAD) N/A 6/29/2022    Procedure: INSERTION, RVAD;  Surgeon: Luis F Paige MD;  Location: [facility] OR [facility];  Service: Cardiovascular;  Laterality: N/A;    INSERTION OF GRAFT TO PERICARDIUM Right 6/30/2022    Procedure: INSERTION-RIGHT VENTRICULAR ASSIST DEVICE;  Surgeon: Luis F Paige MD;  Location: [facility] OR [facility]LR;  Service: Cardiovascular;  Laterality: Right;    IRRIGATION OF MEDIASTINUM  6/30/2022    Procedure: IRRIGATION, MEDIASTINUM;  Surgeon: Luis F Paige MD;  Location: [facility] OR [facility];  Service: Cardiovascular;;    LEFT VENTRICULAR ASSIST DEVICE Left 6/23/2022    Procedure: INSERTION-LEFT VENTRICULAR ASSIST DEVICE;  Surgeon: Luis F Paige MD;  Location: [facility] OR [facility]LR;  Service: Cardiovascular;  Laterality: Left;    LEFT VENTRICULAR ASSIST DEVICE N/A 6/29/2022    Procedure: INSERTION-LEFT VENTRICULAR ASSIST DEVICE;  Surgeon: Luis F Paige MD;  Location: [facility] OR [facility];  Service: Cardiovascular;  Laterality: N/A;    REVISION OF SKIN POCKET FOR CARDIOVERTER-DEFIBRILLATOR  11/9/2023    Procedure: REVISION, SKIN POCKET, FOR CARDIOVERTER-DEFIBRILLATOR;  Surgeon: ADALID Leon MD;  Location: [facility] EP LAB;  Service: Cardiology;;    RIGHT HEART CATHETERIZATION Right 4/8/2022    Procedure: INSERTION, CATHETER, RIGHT HEART;  Surgeon: Luca Lopez Jr., MD;  Location: [facility] CATH LAB;  Service: Cardiology;  Laterality: Right;    RIGHT HEART CATHETERIZATION Right 4/19/2022    Procedure: INSERTION, CATHETER, RIGHT HEART;  Surgeon: Josh Pulido MD;  Location: [facility] CATH LAB;  Service: Cardiology;  Laterality: Right;    RIGHT HEART CATHETERIZATION Right 7/21/2022    Procedure: INSERTION, CATHETER, RIGHT HEART;  Surgeon: Dalia Crum MD;  Location: [facility] CATH LAB;  Service: Cardiology;  Laterality: Right;    RIGHT HEART CATHETERIZATION Right 10/31/2022    Procedure: INSERTION, CATHETER, RIGHT HEART;  Surgeon:  Dalia Crum MD;  Location: [facility] CATH LAB;  Service: Cardiology;  Laterality: Right;    STERNAL WOUND CLOSURE N/A 2022    Procedure: CLOSURE, WOUND, STERNUM;  Surgeon: Luis F Paige MD;  [facility];  Service: Cardiovascular;  Laterality: N/A;     Family History   Problem Relation Age of Onset    Heart failure Father     Diverticulosis Brother     Heart attack Maternal Grandmother     Heart attack Maternal Grandfather      Social History     Tobacco Use    Smoking status: Former     Current packs/day: 0.00     Average packs/day: 0.5 packs/day for 16.6 years (8.3 ttl pk-yrs)     Types: Cigarettes     Start date: 10/1/2004     Quit date: 2021     Years since quittin.6    Smokeless tobacco: Never   Substance Use Topics    Alcohol use: Not Currently    Drug use: Not Currently     Types: Marijuana, MDMA (Ecstacy)       Review of patient's allergies indicates:   Allergen Reactions    Bumex [bumetanide] Hives    Torsemide Hives         Objective:   VS (24h): There were no vitals filed for this visit.      Physical Exam  Constitutional:       General: He is not in acute distress.     Appearance: He is not ill-appearing.   HENT:      Head: Normocephalic and atraumatic.   Eyes:      General: No scleral icterus.        Right eye: No discharge.         Left eye: No discharge.   Pulmonary:      Effort: Pulmonary effort is normal.   Neurological:      Mental Status: He is alert and oriented to person, place, and time.             Labs: reviewed       Micro:      OR cx - MSSA, MRSE (superficial/deep pocket); cx from lead tip ngtd   blcx ngtd   blcx ngtd  8/3 pleural cx ngtd   blcx MSSA      Radiology:     CXR 9/3 -  interval improvement in L effusion/consolidation compared to prior    Immunization History   Administered Date(s) Administered    COVID-19, MRNA, LN-S, PF (Pfizer) (Purple Cap) 2021, 2021    DTP 1985, 1985, 1985, 1987    HIB 1990     Influenza - Quadrivalent - PF *Preferred* (6 months and older) 10/16/2020, 09/24/2021, 12/16/2022    MMR 06/01/1987    OPV 1985, 1985, 06/01/1987         Assessment:     1. 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  Dispense: 60 capsule; Refill: 11    2. Bacteremia due to Staphylococcus  - cefadroxil (DURICEF) 500 MG Cap; Take 1 capsule (500 mg total) by mouth every 12 (twelve) hours.  Dispense: 60 capsule; Refill: 11    3. Empyema of lung  - cefadroxil (DURICEF) 500 MG Cap; Take 1 capsule (500 mg total) by mouth every 12 (twelve) hours.  Dispense: 60 capsule; Refill: 11    4. Infection involving implantable cardioverter-defibrillator (ICD), initial encounter           38 y.o. male with LVAD c/b prior MSSA DLESI and MSSA bacteremia c/b L empyema s/p chest tube placement (s/p 6w course of ancef) with recent admission for ICD infection who is referred to my clinic for follow up. Hospital course notable for complete ICD removal on 11/9 (OR cx - deep and superficial pockets with MSSA/staph epi, lead tip cx and blcx ngtd); GUILLERMO without e/o vegetation. Completed 14d course of daptomycin, tolerated without issues.       Plan:     -reordered suppressive cefadroxil 500 mg q12hr given prior MSSA bacteremia/multiple DLESI  -PICC line mgmt per HTS (on primacor)  -monitor surgical wound - advised pt and wife to let team know if drainage worsens or if he develops any worsening fevers/chills        Follow up in 2m, virtual visit per pt pref    Management of bacteremia/empyema were discussed with patient. Patient was given ample time for questions, all questions answered. Strict return precautions given to patient.         Laura Baldwin MD  Infectious Disease

## 2023-11-30 ENCOUNTER — TELEPHONE (OUTPATIENT)
Dept: TRANSPLANT | Facility: CLINIC | Age: 38
End: 2023-11-30
Payer: MEDICAID

## 2023-11-30 LAB
EXT ALBUMIN: 3.2
EXT ALT: 22
EXT AST: 15
EXT BUN: 8
EXT CALCIUM: 8.9
EXT CHLORIDE: 95
EXT CREATININE: 1.32 MG/DL
EXT GLUCOSE: 271
EXT HEMATOCRIT: 25.1
EXT HEMOGLOBIN: 7.6
EXT MAGNESIUM: 1.5
EXT PLATELETS: 303
EXT POTASSIUM: 3
EXT PROTEIN TOTAL: 8.3
EXT SODIUM: 133 MMOL/L
EXT WBC: 11.4
INR PPP: 2.55

## 2023-11-30 NOTE — PROGRESS NOTES
Spoke to Ms. Wright regarding missed labs. Per Ms. Wright, labs have been missed due to transportation issues. Ms. Wright requested that INR lab be set up for today, 11/30, with the [facility] lab. Orders faxed.

## 2023-12-01 ENCOUNTER — ANTI-COAG VISIT (OUTPATIENT)
Dept: CARDIOLOGY | Facility: CLINIC | Age: 38
End: 2023-12-01

## 2023-12-01 ENCOUNTER — ANTI-COAG VISIT (OUTPATIENT)
Dept: CARDIOLOGY | Facility: CLINIC | Age: 38
End: 2023-12-01
Payer: MEDICAID

## 2023-12-01 DIAGNOSIS — Z95.811 LVAD (LEFT VENTRICULAR ASSIST DEVICE) PRESENT: Primary | ICD-10-CM

## 2023-12-01 LAB — INR PPP: 2.55

## 2023-12-01 PROCEDURE — 93793 PR ANTICOAGULANT MGMT FOR PT TAKING WARFARIN: ICD-10-PCS | Mod: ,,,

## 2023-12-01 PROCEDURE — 93793 ANTICOAG MGMT PT WARFARIN: CPT | Mod: ,,,

## 2023-12-01 NOTE — PROGRESS NOTES
Patient advised of dose instructions and verbalized understanding.  Patient started cefadroxil (DURICEF) 500 MG one tablet every 12 hours  as of 11/29/23.  Will redraw labs at [facility] () [phone]  on 12/06/23.

## 2023-12-01 NOTE — PROGRESS NOTES
Page (wife) called and was given lab result, verified warfarin dose: 4.5mg daily except 6 mg Wednesday and Saturday, reports finished IV antibiotic and 11/30 started another antibiotic - wife to call coumadin clinic back with antibiotic name.

## 2023-12-06 ENCOUNTER — TELEPHONE (OUTPATIENT)
Dept: TRANSPLANT | Facility: CLINIC | Age: 38
End: 2023-12-06
Payer: MEDICAID

## 2023-12-06 PROBLEM — T82.9XXA COMPLICATIONS DUE TO AUTOMATIC IMPLANTABLE CARDIOVERTER-DEFIBRILLATOR: Status: ACTIVE | Noted: 2023-12-06

## 2023-12-06 NOTE — TELEPHONE ENCOUNTER
BNP LAB RESULTED SCANNED INTO PATIENT CHART  BNP COMPLETED ON 11/30/23  LAB ROUTED TO VAD COORDINATORS.

## 2023-12-07 ENCOUNTER — TELEPHONE (OUTPATIENT)
Dept: TRANSPLANT | Facility: CLINIC | Age: 38
End: 2023-12-07
Payer: MEDICAID

## 2023-12-07 NOTE — TELEPHONE ENCOUNTER
Robyn called to report they are having problems with [facility].  [facility] doesn't want them getting the labs at [facility] and want Kevan to go get his labs there.  I said that is ok.  She said we wanted him to go to [facility].  She said [facility] won't be able to provide them with the dobutamine if they can't get the lab results.  I told Robyn we are fine with him getting his labs at [facility] but if [facility] won't supply his dobutamine then we are not ok with him getting his labs at [facility] anymore. Robyn said they are having trouble with transportation.  I asked if they can call a relative, a friend, take a bus from their house to [facility] in [place] and she said they don't have buses there.  She said she will speak the the [facility] manager again this afternoon.

## 2023-12-08 ENCOUNTER — TELEPHONE (OUTPATIENT)
Dept: TRANSPLANT | Facility: CLINIC | Age: 38
End: 2023-12-08
Payer: MEDICAID

## 2023-12-08 ENCOUNTER — PATIENT MESSAGE (OUTPATIENT)
Dept: CARDIOTHORACIC SURGERY | Facility: CLINIC | Age: 38
End: 2023-12-08
Payer: MEDICAID

## 2023-12-08 NOTE — TELEPHONE ENCOUNTER
----- Message from Jelly Hummel LPN sent at 11/17/2022 10:04 AM CST -----  Regarding: Weekly milrinone labs--link with pt/inr appointment  [facility] Drawstation (may/may not received results same day) / [facility] ph [phone]  ----- Message -----  From: Inessa Arana RN  Sent: 9/22/2022   8:42 AM CST  To: [facility]  Subject: Weekly milrinone labs                             [facility] ph [phone]

## 2023-12-08 NOTE — TELEPHONE ENCOUNTER
Contacted   [facility] ph [phone]    in efforts to obtain if patient actually visited the facility to get labs drawn     Patient did not report to facility to get labs drawn     Attempted to reach out to patient , no answer.

## 2023-12-11 PROBLEM — I63.412 EMBOLIC STROKE INVOLVING LEFT MIDDLE CEREBRAL ARTERY: Status: RESOLVED | Noted: 2023-08-31 | Resolved: 2023-12-11

## 2023-12-13 ENCOUNTER — TELEPHONE (OUTPATIENT)
Dept: TRANSPLANT | Facility: CLINIC | Age: 38
End: 2023-12-13
Payer: MEDICAID

## 2023-12-13 ENCOUNTER — ANTI-COAG VISIT (OUTPATIENT)
Dept: CARDIOLOGY | Facility: CLINIC | Age: 38
End: 2023-12-13
Payer: MEDICAID

## 2023-12-13 DIAGNOSIS — Z95.811 LVAD (LEFT VENTRICULAR ASSIST DEVICE) PRESENT: Primary | ICD-10-CM

## 2023-12-13 LAB — INR PPP: 2.5

## 2023-12-13 PROCEDURE — 93793 ANTICOAG MGMT PT WARFARIN: CPT | Mod: ,,,

## 2023-12-13 PROCEDURE — 93793 PR ANTICOAGULANT MGMT FOR PT TAKING WARFARIN: ICD-10-PCS | Mod: ,,,

## 2023-12-13 NOTE — TELEPHONE ENCOUNTER
Received call back from [facility] (Ph) [phone] (Fx) [phone]   No order received     Confirmed fax number  New lab weekly orders faxed   VAD coordinators notified.

## 2023-12-13 NOTE — TELEPHONE ENCOUNTER
[facility] [phone] (Fx) [phone] TO OBTAIN WEEKLY LAB RESULTS   THEY DMITRIY PT/INR ONLY  HOWEVER SUPER IS LOOKING INTO HOW THE ERROR OCCURRED. CALL BACK NUMBER PROVIDED   AWAITING CALL BACK    VAD COORDINATOR NOTIFIED

## 2023-12-19 ENCOUNTER — TELEPHONE (OUTPATIENT)
Dept: TRANSPLANT | Facility: CLINIC | Age: 38
End: 2023-12-19
Payer: MEDICAID

## 2023-12-19 NOTE — TELEPHONE ENCOUNTER
Page received from patient's fianceRobyn. She states that the patient is running out of dressing kits. Patient was scheduled for appt on 10/16 and was a no show. He was admitted 11/8 and sent home with dressing supplies. He has a f/u appt on 1/3. Dressing supplies will be ordered at that visit.     Robyn informs that patient has a bump, resembles a pimple, at his exit site. She states that it is not draining. Patient denies tenderness, fever or chills. I asked that Robyn send a picture via [employer] or email so that we can assess if patient needs to be seen prior to 1/3.

## 2023-12-19 NOTE — TELEPHONE ENCOUNTER
Called patient regarding picture of driveline with bump patient's reported concern for. Site not concerning at this time as it does not appear infected, red or irritated. Attempted to contact patient and no answer nor voicemail.

## 2023-12-20 ENCOUNTER — TELEPHONE (OUTPATIENT)
Dept: TRANSPLANT | Facility: CLINIC | Age: 38
End: 2023-12-20
Payer: MEDICAID

## 2023-12-20 NOTE — PROGRESS NOTES
12/20/2023-Darlyn stated pt didn't go for INR 12/19/2023 because he has been having full body cramps since he was discharged from the hospital 11/2023. Stated pt can't get out of the bed most days due to the cramps and has a seizure most days due to the cramps, stated they have not contacted LVAD Team regarding symptoms. Agathaomar stated she will take pt for INR today, but wanted to let us know about his symptoms.

## 2023-12-20 NOTE — TELEPHONE ENCOUNTER
Page received from Robyn hendrix. First, she asked if we received the picture of patient's DLES. I informed that RIAN Bustamante tried to contact her yesterday as she reviewed the picture and there is no concern at this time as there is no drainage or tenderness.     Robyn then informs me that the patient will probably go to the ER before his next clinic appt because since discharge, patient has been is having episodes of severe muscle cramping throughout his body that turns into him having a seizure. I urged that patient report to the ER now and not to wait. Robyn states that she is going to make him go to the ER because he hasn't wanted to.     I informed Dr. Marques of the potential ER visit.

## 2023-12-21 ENCOUNTER — TELEPHONE (OUTPATIENT)
Dept: TRANSPLANT | Facility: CLINIC | Age: 38
End: 2023-12-21
Payer: MEDICAID

## 2023-12-21 NOTE — TELEPHONE ENCOUNTER
Contacted  [facility] in efforts to obtain weekly lab results   Patient did not visit their lab location either.   VAD coordinators made aware.

## 2023-12-21 NOTE — TELEPHONE ENCOUNTER
Contacted [facility] (Ph) [phone] (Fx) [phone] / in efforts to obtain weekly lab , patient did not visit lab this week . This information relayed to VAD coordinators.

## 2023-12-22 ENCOUNTER — HOSPITAL ENCOUNTER (EMERGENCY)
Facility: HOSPITAL | Age: 38
Discharge: HOME | End: 2023-12-22
Attending: STUDENT IN AN ORGANIZED HEALTH CARE EDUCATION/TRAINING PROGRAM
Payer: MEDICAID

## 2023-12-22 VITALS
HEART RATE: 120 BPM | HEIGHT: 75 IN | OXYGEN SATURATION: 100 % | RESPIRATION RATE: 22 BRPM | WEIGHT: 185 LBS | TEMPERATURE: 98 F | BODY MASS INDEX: 23 KG/M2

## 2023-12-22 DIAGNOSIS — R56.9 SEIZURE-LIKE ACTIVITY: ICD-10-CM

## 2023-12-22 DIAGNOSIS — R56.9 SEIZURE: Primary | ICD-10-CM

## 2023-12-22 DIAGNOSIS — R25.2 MUSCLE CRAMPS: ICD-10-CM

## 2023-12-22 DIAGNOSIS — Z95.811 LVAD (LEFT VENTRICULAR ASSIST DEVICE) PRESENT: ICD-10-CM

## 2023-12-22 LAB
ACANTHOCYTES (OLG): ABNORMAL
ALBUMIN SERPL-MCNC: 3.2 G/DL (ref 3.5–5)
ALBUMIN/GLOB SERPL: 0.7 RATIO (ref 1.1–2)
ALP SERPL-CCNC: 171 UNIT/L (ref 40–150)
ALT SERPL-CCNC: 16 UNIT/L (ref 0–55)
ANISOCYTOSIS BLD QL SMEAR: ABNORMAL
APTT PPP: 36.6 SECONDS (ref 23.2–33.7)
AST SERPL-CCNC: 30 UNIT/L (ref 5–34)
BASE EXCESS BLD CALC-SCNC: 3.5 MMOL/L
BASOPHILS # BLD AUTO: 0.04 X10(3)/MCL
BASOPHILS NFR BLD AUTO: 0.5 %
BILIRUB SERPL-MCNC: 1.2 MG/DL
BLOOD GAS SAMPLE TYPE (OHS): ABNORMAL
BUN SERPL-MCNC: 8.4 MG/DL (ref 8.9–20.6)
CA-I BLD-SCNC: 1.13 MMOL/L (ref 1.12–1.23)
CALCIUM SERPL-MCNC: 8.9 MG/DL (ref 8.4–10.2)
CHLORIDE SERPL-SCNC: 102 MMOL/L (ref 98–107)
CK SERPL-CCNC: 544 U/L (ref 30–200)
CO2 BLDA-SCNC: 29 MMOL/L
CO2 SERPL-SCNC: 20 MMOL/L (ref 22–29)
COHGB MFR BLDA: 2.9 %
CREAT SERPL-MCNC: 1.38 MG/DL (ref 0.73–1.18)
DRAWN BY BLOOD GAS (OHS): ABNORMAL
EOSINOPHIL # BLD AUTO: 0.06 X10(3)/MCL (ref 0–0.9)
EOSINOPHIL NFR BLD AUTO: 0.8 %
ERYTHROCYTE [DISTWIDTH] IN BLOOD BY AUTOMATED COUNT: 23 % (ref 11.5–17)
ETHANOL SERPL-MCNC: <10 MG/DL
GFR SERPLBLD CREATININE-BSD FMLA CKD-EPI: >60 MLS/MIN/1.73/M2
GLOBULIN SER-MCNC: 4.7 GM/DL (ref 2.4–3.5)
GLUCOSE SERPL-MCNC: 121 MG/DL (ref 74–100)
HCO3 BLDA-SCNC: 27.8 MMOL/L
HCT VFR BLD AUTO: 24.8 % (ref 42–52)
HGB BLD-MCNC: 7 G/DL (ref 14–18)
HYPOCHROMIA BLD QL SMEAR: ABNORMAL
IMM GRANULOCYTES # BLD AUTO: 0.02 X10(3)/MCL (ref 0–0.04)
IMM GRANULOCYTES NFR BLD AUTO: 0.3 %
INR PPP: 2.9
LYMPHOCYTES # BLD AUTO: 1.42 X10(3)/MCL (ref 0.6–4.6)
LYMPHOCYTES NFR BLD AUTO: 18.9 %
MACROCYTES BLD QL SMEAR: ABNORMAL
MAGNESIUM SERPL-MCNC: 1.8 MG/DL (ref 1.6–2.6)
MCH RBC QN AUTO: 18.6 PG (ref 27–31)
MCHC RBC AUTO-ENTMCNC: 28.2 G/DL (ref 33–36)
MCV RBC AUTO: 66 FL (ref 80–94)
METHGB MFR BLDA: 0.8 %
MICROCYTES BLD QL SMEAR: ABNORMAL
MONOCYTES # BLD AUTO: 0.82 X10(3)/MCL (ref 0.1–1.3)
MONOCYTES NFR BLD AUTO: 10.9 %
NEUTROPHILS # BLD AUTO: 5.15 X10(3)/MCL (ref 2.1–9.2)
NEUTROPHILS NFR BLD AUTO: 68.6 %
NRBC BLD AUTO-RTO: 2 %
O2 HB BLOOD GAS (OHS): 82.1 %
OVALOCYTES (OLG): ABNORMAL
OXYHGB MFR BLDA: 7.2 G/DL
PCO2 BLDA: 40 MMHG (ref 20–50)
PH BLDA: 7.45 [PH] (ref 7.3–7.6)
PLATELET # BLD AUTO: 303 X10(3)/MCL (ref 130–400)
PLATELET # BLD EST: NORMAL 10*3/UL
PMV BLD AUTO: 9.6 FL (ref 7.4–10.4)
PO2 BLDA: 48 MMHG
POIKILOCYTOSIS BLD QL SMEAR: ABNORMAL
POTASSIUM BLOOD GAS (OHS): 3.9 MMOL/L (ref 3.5–5)
POTASSIUM SERPL-SCNC: 3.5 MMOL/L (ref 3.5–5.1)
PROT SERPL-MCNC: 7.9 GM/DL (ref 6.4–8.3)
PROTHROMBIN TIME: 29.6 SECONDS (ref 12.5–14.5)
RBC # BLD AUTO: 3.76 X10(6)/MCL (ref 4.7–6.1)
RBC MORPH BLD: ABNORMAL
SAO2 % BLDA: 85.5 %
SCHISTOCYTE (OLG): ABNORMAL
SODIUM BLOOD GAS (OHS): 133 MMOL/L (ref 137–145)
SODIUM SERPL-SCNC: 135 MMOL/L (ref 136–145)
TARGETS BLD QL SMEAR: ABNORMAL
TROPONIN I SERPL-MCNC: 0.11 NG/ML (ref 0–0.04)
WBC # SPEC AUTO: 7.51 X10(3)/MCL (ref 4.5–11.5)

## 2023-12-22 PROCEDURE — 82803 BLOOD GASES ANY COMBINATION: CPT

## 2023-12-22 PROCEDURE — 83735 ASSAY OF MAGNESIUM: CPT | Performed by: STUDENT IN AN ORGANIZED HEALTH CARE EDUCATION/TRAINING PROGRAM

## 2023-12-22 PROCEDURE — 80053 COMPREHEN METABOLIC PANEL: CPT | Performed by: STUDENT IN AN ORGANIZED HEALTH CARE EDUCATION/TRAINING PROGRAM

## 2023-12-22 PROCEDURE — 96375 TX/PRO/DX INJ NEW DRUG ADDON: CPT

## 2023-12-22 PROCEDURE — 80177 DRUG SCRN QUAN LEVETIRACETAM: CPT | Performed by: STUDENT IN AN ORGANIZED HEALTH CARE EDUCATION/TRAINING PROGRAM

## 2023-12-22 PROCEDURE — 96365 THER/PROPH/DIAG IV INF INIT: CPT

## 2023-12-22 PROCEDURE — 84484 ASSAY OF TROPONIN QUANT: CPT | Performed by: STUDENT IN AN ORGANIZED HEALTH CARE EDUCATION/TRAINING PROGRAM

## 2023-12-22 PROCEDURE — 99285 EMERGENCY DEPT VISIT HI MDM: CPT | Mod: 25

## 2023-12-22 PROCEDURE — 82550 ASSAY OF CK (CPK): CPT | Performed by: STUDENT IN AN ORGANIZED HEALTH CARE EDUCATION/TRAINING PROGRAM

## 2023-12-22 PROCEDURE — 85025 COMPLETE CBC W/AUTO DIFF WBC: CPT | Performed by: STUDENT IN AN ORGANIZED HEALTH CARE EDUCATION/TRAINING PROGRAM

## 2023-12-22 PROCEDURE — 63600175 PHARM REV CODE 636 W HCPCS: Performed by: STUDENT IN AN ORGANIZED HEALTH CARE EDUCATION/TRAINING PROGRAM

## 2023-12-22 PROCEDURE — 82077 ASSAY SPEC XCP UR&BREATH IA: CPT | Performed by: STUDENT IN AN ORGANIZED HEALTH CARE EDUCATION/TRAINING PROGRAM

## 2023-12-22 PROCEDURE — 85610 PROTHROMBIN TIME: CPT | Performed by: STUDENT IN AN ORGANIZED HEALTH CARE EDUCATION/TRAINING PROGRAM

## 2023-12-22 PROCEDURE — 99900035 HC TECH TIME PER 15 MIN (STAT)

## 2023-12-22 PROCEDURE — 93005 ELECTROCARDIOGRAM TRACING: CPT

## 2023-12-22 PROCEDURE — 82962 GLUCOSE BLOOD TEST: CPT

## 2023-12-22 PROCEDURE — 85730 THROMBOPLASTIN TIME PARTIAL: CPT | Performed by: STUDENT IN AN ORGANIZED HEALTH CARE EDUCATION/TRAINING PROGRAM

## 2023-12-22 RX ORDER — HYDRALAZINE HYDROCHLORIDE 20 MG/ML
10 INJECTION INTRAMUSCULAR; INTRAVENOUS
Status: COMPLETED | OUTPATIENT
Start: 2023-12-22 | End: 2023-12-22

## 2023-12-22 RX ORDER — CYCLOBENZAPRINE HCL 10 MG
10 TABLET ORAL 3 TIMES DAILY PRN
Qty: 21 TABLET | Refills: 0 | Status: SHIPPED | OUTPATIENT
Start: 2023-12-22 | End: 2023-12-22 | Stop reason: CLARIF

## 2023-12-22 RX ORDER — METHOCARBAMOL 500 MG/1
1000 TABLET, FILM COATED ORAL 3 TIMES DAILY PRN
Qty: 42 TABLET | Refills: 0 | Status: SHIPPED | OUTPATIENT
Start: 2023-12-22 | End: 2023-12-29

## 2023-12-22 RX ORDER — LEVETIRACETAM 1000 MG/1
1500 TABLET ORAL 2 TIMES DAILY
Qty: 60 TABLET | Refills: 11 | Status: SHIPPED | OUTPATIENT
Start: 2023-12-22

## 2023-12-22 RX ORDER — LEVETIRACETAM 10 MG/ML
1000 INJECTION INTRAVASCULAR
Status: DISPENSED | OUTPATIENT
Start: 2023-12-22 | End: 2023-12-22

## 2023-12-22 RX ADMIN — HYDRALAZINE HYDROCHLORIDE 10 MG: 20 INJECTION INTRAMUSCULAR; INTRAVENOUS at 08:12

## 2023-12-22 RX ADMIN — LEVETIRACETAM INJECTION 1000 MG: 10 INJECTION INTRAVENOUS at 05:12

## 2023-12-22 NOTE — ED PROVIDER NOTES
Encounter Date: 12/22/2023    SCRIBE #1 NOTE: I, Jennifer Shankar, am scribing for, and in the presence of,  Rodger Yadav IV, MD. I have scribed the following portions of the note - Other sections scribed: HPI, ROS, PE, EKG.       History     Chief Complaint   Patient presents with    Seizures     EMS reports multiple seizures today, compliant with Keppra, LVAD patient. GCS 15     38 year old male with a hx of CHF, DM, ICD, LVAD, seizures, dilated cardiomyopathy, and substance abuse presents to the ED via EMS for seizures. The patient's significant other on the phone reports that he's had diffuse muscle cramping since his last operation, and his medical team in [place] reccommended he reduce his lasix from 80 to 40 mg which has not resolved the cramping. She reports he has been extremely lethargic and having seizures, and his last one was yesterday. She reports he is compliant with his keppra and eliquis and denies drug or alcohol use. His CBG yesterday read too high to detect.     The history is provided by the patient and a significant other. No  was used.     Review of patient's allergies indicates:   Allergen Reactions    Bumex [bumetanide] Hives    Torsemide Hives     Past Medical History:   Diagnosis Date    Acute respiratory failure with hypoxia 7/22/2023    Arthritis     Awareness alteration, transient 9/1/2022    Cardiomyopathy     CHF (congestive heart failure) 10/01/2020    COVID-19 6/3/2023    Diabetes mellitus     Dilated cardiomyopathy 10/26/2020    Drug abuse 10/2020    Headache 4/19/2023    Hyperglycemia 12/16/2022    Hyperosmolar hyperglycemic state (HHS) 5/25/2022    ICD (implantable cardioverter-defibrillator) in place 10/26/2020    Left ventricular assist device (LVAD) complication, initial encounter 6/5/2023    Muscle cramping 6/15/2022    Renal disorder     SOB (shortness of breath) 6/13/2022    Tingling in extremities 7/13/2022     Past Surgical History:   Procedure  Laterality Date    APPLICATION OF WOUND VACUUM-ASSISTED CLOSURE DEVICE N/A 6/30/2022    Procedure: APPLICATION, WOUND VAC;  Surgeon: Luis F Paige MD;  Location: [facility] OR 2ND FLR;  Service: Cardiovascular;  Laterality: N/A;  50 x 5 cm    CARDIAC DEFIBRILLATOR PLACEMENT      ECHOCARDIOGRAM,TRANSESOPHAGEAL  11/9/2023    Procedure: Transesophageal echo (GUILLERMO) intra-procedure log documentation;  Surgeon: Eron Ivy MD;  Location: [facility] EP LAB;  Service: Cardiology;;    EXTRACTION, ELECTRODE LEAD Left 11/9/2023    Procedure: EXTRACTION, ELECTRODE LEAD;  Surgeon: ADALID Leon MD;  Location: [facility] EP LAB;  Service: Cardiology;  Laterality: Left;  INFECTION, LEAD EXTRACTION, GUILLERMO, BSCI, ANES, EH,   *NO CTS BACKUP*    IMPLANTATION OF RIGHT VENTRICULAR ASSIST DEVICE (RVAD) N/A 6/29/2022    Procedure: INSERTION, RVAD;  Surgeon: Luis F Paige MD;  [facility];  Service: Cardiovascular;  Laterality: N/A;    INSERTION OF GRAFT TO PERICARDIUM Right 6/30/2022    Procedure: INSERTION-RIGHT VENTRICULAR ASSIST DEVICE;  Surgeon: Luis F Paige MD;  [facility];  Service: Cardiovascular;  Laterality: Right;    IRRIGATION OF MEDIASTINUM  6/30/2022    Procedure: IRRIGATION, MEDIASTINUM;  Surgeon: Luis F Paige MD;  [facility];  Service: Cardiovascular;;    LEFT VENTRICULAR ASSIST DEVICE Left 6/23/2022    Procedure: INSERTION-LEFT VENTRICULAR ASSIST DEVICE;  Surgeon: Luis F Paige MD;  [facility];  Service: Cardiovascular;  Laterality: Left;    LEFT VENTRICULAR ASSIST DEVICE N/A 6/29/2022    Procedure: INSERTION-LEFT VENTRICULAR ASSIST DEVICE;  Surgeon: Luis F Paige MD;  Location: [facility] OR [facility] FLR;  Service: Cardiovascular;  Laterality: N/A;    REVISION OF SKIN POCKET FOR CARDIOVERTER-DEFIBRILLATOR  11/9/2023    Procedure: REVISION, SKIN POCKET, FOR CARDIOVERTER-DEFIBRILLATOR;  Surgeon: ADALID Leon MD;  Location: [facility] EP LAB;  Service: Cardiology;;     RIGHT HEART CATHETERIZATION Right 2022    Procedure: INSERTION, CATHETER, RIGHT HEART;  Surgeon: Luca Lopez Jr., MD;  Location: [facility] CATH LAB;  Service: Cardiology;  Laterality: Right;    RIGHT HEART CATHETERIZATION Right 2022    Procedure: INSERTION, CATHETER, RIGHT HEART;  Surgeon: Josh Pulido MD;  Location: [facility] CATH LAB;  Service: Cardiology;  Laterality: Right;    RIGHT HEART CATHETERIZATION Right 2022    Procedure: INSERTION, CATHETER, RIGHT HEART;  Surgeon: Dalia Crum MD;  Location: [facility] CATH LAB;  Service: Cardiology;  Laterality: Right;    RIGHT HEART CATHETERIZATION Right 10/31/2022    Procedure: INSERTION, CATHETER, RIGHT HEART;  Surgeon: Dalia Crum MD;  Location: [facility] CATH LAB;  Service: Cardiology;  Laterality: Right;    STERNAL WOUND CLOSURE N/A 2022    Procedure: CLOSURE, WOUND, STERNUM;  Surgeon: Luis F Paige MD;  Location: [facility] OR [address] [place] [address];  Service: Cardiovascular;  Laterality: N/A;     Family History   Problem Relation Age of Onset    Heart failure Father     Diverticulosis Brother     Heart attack Maternal Grandmother     Heart attack Maternal Grandfather      Social History     Tobacco Use    Smoking status: Former     Current packs/day: 0.00     Average packs/day: 0.5 packs/day for 16.6 years (8.3 ttl pk-yrs)     Types: Cigarettes     Start date: 10/1/2004     Quit date: 2021     Years since quittin.6    Smokeless tobacco: Never   Substance Use Topics    Alcohol use: Not Currently    Drug use: Not Currently     Types: Marijuana, MDMA (Ecstacy)     Review of Systems   Constitutional:  Positive for fatigue. Negative for chills and fever.   HENT:  Negative for congestion, rhinorrhea and sore throat.    Eyes:  Negative for visual disturbance.   Respiratory:  Negative for cough and shortness of breath.    Cardiovascular:  Negative for chest pain.   Gastrointestinal:  Negative for abdominal pain, nausea and vomiting.   Genitourinary:   Negative for dysuria and hematuria.   Musculoskeletal:  Negative for joint swelling.        Diffuse cramping    Skin:  Negative for rash.   Neurological:  Positive for seizures. Negative for weakness.   Psychiatric/Behavioral:  Negative for confusion.    All other systems reviewed and are negative.      Physical Exam     Initial Vitals [12/22/23 1600]   BP Pulse Resp Temp SpO2   -- (!) 120 (!) 22 97.9 °F (36.6 °C) 100 %      MAP       --         Physical Exam    Nursing note and vitals reviewed.  Constitutional: He is not diaphoretic. No distress.   Cardiovascular:            LVAD device in place - is charged and its parameters are within normal limits, mechanical humming over the precordium consistent with LVAD    Pulmonary/Chest: No respiratory distress.   Dressing to L chest wall c/d/I, underlying wound from previous ICD pocket infection healing well, no drainage or erythema    Abdominal: Abdomen is soft. He exhibits no distension. There is no abdominal tenderness.   Musculoskeletal:      Comments: PICC RUE c/d/i     Neurological: He is alert.   Psychiatric: He has a normal mood and affect.         ED Course   Procedures  Labs Reviewed   COMPREHENSIVE METABOLIC PANEL - Abnormal; Notable for the following components:       Result Value    Sodium Level 135 (*)     Carbon Dioxide 20 (*)     Glucose Level 121 (*)     Blood Urea Nitrogen 8.4 (*)     Creatinine 1.38 (*)     Albumin Level 3.2 (*)     Globulin 4.7 (*)     Albumin/Globulin Ratio 0.7 (*)     Alkaline Phosphatase 171 (*)     All other components within normal limits   TROPONIN I - Abnormal; Notable for the following components:    Troponin-I 0.110 (*)     All other components within normal limits   PROTIME-INR - Abnormal; Notable for the following components:    PT 29.6 (*)     INR 2.9 (*)     All other components within normal limits   APTT - Abnormal; Notable for the following components:    PTT 36.6 (*)     All other components within normal limits   CBC  WITH DIFFERENTIAL - Abnormal; Notable for the following components:    RBC 3.76 (*)     Hgb 7.0 (*)     Hct 24.8 (*)     MCV 66.0 (*)     MCH 18.6 (*)     MCHC 28.2 (*)     RDW 23.0 (*)     All other components within normal limits   BLOOD SMEAR MICROSCOPIC EXAM (OLG) - Abnormal; Notable for the following components:    RBC Morph Abnormal (*)     Acanthocytes 1+ (*)     Anisocytosis 2+ (*)     Hypochromasia 1+ (*)     Macrocytosis 1+ (*)     Microcytosis 1+ (*)     Ovalocytes 1+ (*)     Poikilocytosis 2+ (*)     Schistocytes 1+ (*)     Target Cells 1+ (*)     All other components within normal limits   BLOOD GAS - Abnormal; Notable for the following components:    Sodium, Blood Gas 133 (*)     All other components within normal limits   CK - Abnormal; Notable for the following components:    Creatine Kinase 544 (*)     All other components within normal limits   ALCOHOL,MEDICAL (ETHANOL) - Normal   MAGNESIUM - Normal   CBC W/ AUTO DIFFERENTIAL    Narrative:     The following orders were created for panel order CBC auto differential.  Procedure                               Abnormality         Status                     ---------                               -----------         ------                     CBC with Differential[[id number]]       Abnormal            Final result                 Please view results for these tests on the individual orders.   LEVETIRACETAM  (KEPPRA) LEVEL   POCT GLUCOSE     EKG Readings: (Independently Interpreted)   Initial Reading: No STEMI. Heart Rate: 118. Ectopy: No Ectopy. Conduction: Normal. Axis: Normal.   Done at 16:07, significant artifact and widened QRS consistent with LVAD, no ischemic changes      ECG Results              EKG 12-lead (Final result)  Result time 12/22/23 17:30:04      Final result by Interface, Lab In [facility] (12/22/23 17:30:04)                   Narrative:    Test Reason : R56.9,    Vent. Rate : 118 BPM     Atrial Rate : 000 BPM     P-R Int : 000 ms           QRS Dur : 180 ms      QT Int : 436 ms       P-R-T Axes : 000 -73 164 degrees     QTc Int : 611 ms    Technically poor ECG tracing  Confirmed by Favian Davis MD ([id number]) on 12/22/2023 5:29:57 PM    Referred By:             Confirmed By:Favian Davis MD                                  Imaging Results              CT Head Without Contrast (Final result)  Result time 12/22/23 18:20:20      Final result by Dyana Chance MD (12/22/23 18:20:20)                   Impression:      No acute intracranial abnormality.      Electronically signed by: Dyana Chance  Date:    12/22/2023  Time:    18:20               Narrative:    EXAMINATION:  CT HEAD WITHOUT CONTRAST    CLINICAL HISTORY:  More frequent seizures;    TECHNIQUE:  Axial scans were obtained from skull base to the vertex.    Coronal and sagittal reconstructions obtained from the axial data.    Automatic exposure control was utilized to limit radiation dose.    Contrast: None    Radiation Dose:    Total DLP: 986 mGy*cm    COMPARISON:  None    FINDINGS:  There is no acute intracranial hemorrhage or edema.  There is encephalomalacia in the left parietal lobe.    There is no mass effect or midline shift. The ventricles and sulci are normal in size. The basal cisterns are patent. There is no abnormal extra-axial fluid collection.    The calvarium and skull base are intact. The visualized paranasal sinuses and the mastoid air cells are clear.                                       X-Ray Chest AP Portable (Final result)  Result time 12/22/23 16:54:37      Final result by Varun Armendariz MD (12/22/23 16:54:37)                   Impression:      Possible small left pleural effusion.  Retraction of the right PICC with tip now overlying the right subclavian region.      Electronically signed by: Varun Armendariz  Date:    12/22/2023  Time:    16:54               Narrative:    EXAMINATION:  XR CHEST AP PORTABLE    CLINICAL HISTORY:  Unspecified  convulsions    TECHNIQUE:  Frontal view(s) of the chest.    COMPARISON:  Radiography 11/12/2023    FINDINGS:  Right PICC tip overlies the right subclavian region.  Prior sternotomy with LVAD and stable cardiomegaly.  The lungs are well-inflated.  No consolidation identified.  Mild blunting of the left costophrenic angle.  No pneumothorax.                                       Medications   levETIRAcetam in NaCl (iso-os) IVPB 1,000 mg (1,000 mg Intravenous Not Given 12/22/23 1730)   hydrALAZINE injection 10 mg (10 mg Intravenous Given 12/22/23 2050)     Medical Decision Making  This is a 38-year-old male with a history of LVAD, seizures, prior CVA, drug use, DM, CHF, medication noncompliance - presenting with multiple generalized tonic-clonic seizures at home per patient and family  Also reportedly hyperglycemic at home his glucose has been controlled in the ER, no evidence of DKA or other acute diabetic related pathology today  No seizures in the ER after prolonged observation of almost 6 hours  Patient also complaining with acute on chronic muscle cramping  Mother bedside and girlfriend via phone both report that patient is compliant with his seizure meds his anticoagulation  Patient recently had infection of ICD which was removed - healing well   Patient well-appearing awake and alert neuro intact in the ER no seizure activity in the ER  His labs are all essentially at baseline, CT head unremarkable  I discussed his workup extensively with LVAD coordinator who recommended that patient can go home, follow up as an outpatient  He does not have a local neurologist - I did speak with Dr. Gómez who recommended increasing his Keppra follow up with a neurologist at [facility] referral has been placed patient provided with information for follow up  Patient's PICC line slightly retracted on chest x-ray he was instructed to follow up very closely with his outpatient team regarding this he has not having any issues with the PICC  line he reports  Decision made after extensive discussions with LVAD coordinator and family to discharge home follow up with his outpatient team strict return precautions given      The differential diagnosis includes, but is not limited to:  Seizure, noncompliance, electrolyte derangement, dehydration, intracranial hemorrhage     Problems Addressed:  LVAD (left ventricular assist device) present: acute illness or injury that poses a threat to life or bodily functions  Muscle cramps: acute illness or injury that poses a threat to life or bodily functions  Seizure: acute illness or injury that poses a threat to life or bodily functions    Amount and/or Complexity of Data Reviewed  Independent Historian: spouse     Details: hx per significant other:  pt had diffuse muscle cramping since his last operation, and his medical team in [place] reccommended he reduce his lasix from 80 to 40 mg which has not resolved the cramping. She reports he has been extremely lethargic and having seizures, and his last one was yesterday. She reports he is compliant with his keppra and eliquis and denies drug or alcohol use. His CBG yesterday read too high to detect.     External Data Reviewed: labs, radiology, ECG and notes.  Labs: ordered.  Radiology: ordered and independent interpretation performed.     Details:  Possible small left pleural effusion, PICC line slightly retracted from optimal position  Head CT - no obvious bleed or mass      ECG/medicine tests: ordered and independent interpretation performed.     Details: No STEMI. Heart Rate: 118. Ectopy: No Ectopy. Conduction: Normal. Axis: Normal.   Done at 16:07, significant artifact and widened QRS consistent with LVAD, no ischemic changes    Discussion of management or test interpretation with external provider(s): Inessa LVAD coordinator [facility] - spoke with multiple times, discussed all labs and imaging, discussed patient's clinical presentation and exam.  LVAD  team ultimately recommended local follow up with a neurologist no need for transfer or admission at this time good ER return precautions    Neurology Dr. Gómez - recommended increasing Keppra to 1500 b.i.d. follow up at [facility] neurology clinic given insurance status    Risk  Prescription drug management.  Decision regarding hospitalization.  Diagnosis or treatment significantly limited by social determinants of health.  Risk Details:   Shared decision making with patient, LVAD coordinator and family bedside             Scribe Attestation:   Scribe #1: I performed the above scribed service and the documentation accurately describes the services I performed. I attest to the accuracy of the note.    Attending Attestation:           Physician Attestation for Scribe:  Physician Attestation Statement for Scribe #1: I, Rodger Yadav IV, MD, reviewed documentation, as scribed by Jennifer Shankar in my presence, and it is both accurate and complete.             ED Course as of 12/23/23 1119   Fri Dec 22, 2023   1739 Inessa with LVAD team - reviewed his workup reports that if his head CT is normal patient can likely be discharged I discussed his slightly low bicarb with her will check a VBG apparently he got a bunch of insulin at home prior to coming here his glucoses okay on his chemistry now but that is post insulin that was given unknown doses at home.  [AC]   2006 Neurology paged  [AC]   2015 Neurology Dr. Gómez - reports patient can increase keppra to 1500 BID, follow up outpatient at [facility] neurology clinic. I spoke extensively with the LVAD coordinators who recommend outpatietn follow up, no need for transfer today. Patient and mother bedside amendable to plan. Will discharge at this time  [AC]   2043 Inessa with LVAD team regarding elevated MAP - give 10 hydralazine, repeat  [AC]   2134 Map improved, will discharge as planned. Return precautions given  [AC]   Sat Dec 23, 2023   1117 I noticed that Mr Queen's PICC line was  slightly retraced on the CXR - I did call and inform him of this and the need to contact his local outpatient care team ASAP for further recommendations and management - he voiced understanding with this. He is not having any pain or complaints regarding the PICC line - he states he has not had any issues and it has been working normally. I instructed him to go to ER with any issues with the PICC line if he is unable to follow up soon with outpatient care team. He voiced understanding  [AC]      ED Course User Index  [AC] Rodger Yadav IV, MD                           Clinical Impression:  Final diagnoses:  [R56.9] Seizure (Primary)  [Z95.811] LVAD (left ventricular assist device) present  [R25.2] Muscle cramps          ED Disposition Condition    Discharge Stable          ED Prescriptions       Medication Sig Dispense Start Date End Date Auth. Provider    levETIRAcetam (KEPPRA) 1000 MG tablet Take 1.5 tablets (1,500 mg total) by mouth 2 (two) times daily. 60 tablet 12/22/2023 -- Rodger Yadav IV, MD    cyclobenzaprine (FLEXERIL) 10 MG tablet  (Status: Discontinued) Take 1 tablet (10 mg total) by mouth 3 (three) times daily as needed for Muscle spasms. 21 tablet 12/22/2023 12/22/2023 Rodger Yadav IV, MD    methocarbamoL (ROBAXIN) 500 MG Tab Take 2 tablets (1,000 mg total) by mouth 3 (three) times daily as needed (Muscle cramps). 42 tablet 12/22/2023 12/29/2023 Rodger Yadav IV, MD          Follow-up Information       Follow up With Specialties Details Why Contact Info    Lynn Jolley MD Neurology, Psychiatry Schedule an appointment as soon as possible for a visit   [address] [facility] [address] [phone]      [facility]  Emergency Dept Emergency Medicine Go to  If symptoms worsen [facility] [facility] [address] [phone]    Rosio Armendariz, FNP Nurse Practitioner   [address] [facility] [address] [phone]      [facility] -  Neurology Neurology Schedule an appointment as soon as possible for a visit   [address] [facility] [address]             Rodger Yadav IV, MD  12/23/23 0982

## 2023-12-23 LAB — POCT GLUCOSE: 94 MG/DL (ref 70–110)

## 2023-12-23 NOTE — DISCHARGE INSTRUCTIONS
Thanks for letting use take care of you today! It is our goal to give you courteous care and to keep you comfortable and informed. If you have any questions before you leave I will be happy to try and answer them.     Advice after your visit:  Your visit in the emergency department is NOT definitive care - please follow-up with your primary care doctor and/or specialist within 1-2 days. If you do not have a primary care physician call [phone] to schedule an appointment. Please return if you have any worsening in your condition or if you have any other concerns.    Return to the emergency department if any worsening symptoms including fever, chest pain, difficulty breathing, weakness, numbness, tingling, nausea, vomiting, inability to eat, drink or take your medication, or any other new symptoms or concerns arise.      Please signup for MyChart as noted below in your paperwork to review all labwork, imaging results, and any other incidental findings from today's visit.     If you had radiology exams like an XRAY or CT in the emergency Department the interpreation on them may be preliminary - there may be less time sensitive findings on the reports please obtain these reports within 24 hours from the hospital or by using your out on your mobile phone to access records.  Bring these to your primary care doctor and/or specialist for further review of incidental findings.    Please review any LAB WORK from your visit today with your primary care physician.    If you were prescribed OPIATE PAIN MEDICATION - please understand of these medications can be addictive, you may fill less of the prescription was written for, you do not have to take the full prescription.  You may discard what you do not use.  Please seek help if you feel you are having problems with addiction.  Do not drive or operate heavy machinery if you are taking sedating medications.  Do not mix these medications with alcohol.      If you had a SPLINT  placed in the emergency department if you have severe pain numbness tingling or discoloration of year digits please remove the splint and return to the emergency department for further evaluation as this may represent a sign of compromise to the nerves or blood vessels due to swelling.    If you had SUTURES in the emergency department please have them removed in the prescribed time frame typically within 7-14 days.  You may shower but please do not bathe or swim.  Keep the wounds clean and dry and covered with a clean dressing.  Please return if he have any signs of infection like redness or drainage or pain at the suture site.    Please take the full course of  any ANTIBIOTICS you were prescribed - incomplete courses of antibiotics can cause resistance to antibiotics in the future which will make it difficult to treat any infections you may have.

## 2023-12-26 ENCOUNTER — TELEPHONE (OUTPATIENT)
Dept: TRANSPLANT | Facility: CLINIC | Age: 38
End: 2023-12-26
Payer: MEDICAID

## 2023-12-26 ENCOUNTER — ANTI-COAG VISIT (OUTPATIENT)
Dept: CARDIOLOGY | Facility: CLINIC | Age: 38
End: 2023-12-26
Payer: MEDICAID

## 2023-12-26 DIAGNOSIS — Z95.811 LVAD (LEFT VENTRICULAR ASSIST DEVICE) PRESENT: Primary | ICD-10-CM

## 2023-12-26 PROCEDURE — 93793 PR ANTICOAGULANT MGMT FOR PT TAKING WARFARIN: ICD-10-PCS | Mod: ,,,

## 2023-12-26 PROCEDURE — 93793 ANTICOAG MGMT PT WARFARIN: CPT | Mod: ,,,

## 2023-12-26 NOTE — TELEPHONE ENCOUNTER
I spoke with patient's fiance, Robyn, via p/c. The site has healed with a scab, she is unable to pack the wound. I asked Robyn to continue cleaning the wound and place a dry dressing.     We will reassess in clinic on 1/3. Robyn verified date and time of clinic appt.     ----- Message from Lucinda López sent at 12/26/2023 11:05 AM CST -----  Regarding: Wound  Patient calling to see if he still need to pack his wound. Please call back @ [phone]. Thank you Lucinda

## 2023-12-27 ENCOUNTER — TELEPHONE (OUTPATIENT)
Dept: TRANSPLANT | Facility: CLINIC | Age: 38
End: 2023-12-27
Payer: MEDICAID

## 2023-12-27 LAB — LEVETIRACETAM SERPL-MCNC: 80.4 MCG/ML (ref 10–40)

## 2023-12-27 NOTE — TELEPHONE ENCOUNTER
Attempted to speak with patient regarding equipment maintenance due at upcoming clinic appointment on 1/3/2024.  Left Mychart message asking patient to bring MPU and UBC with them to next appointment for maintenance.    It is medically necessary to ensure patient has properly functioning equipment and wearables to prevent infection, injury or death to patient.

## 2023-12-28 LAB
EXT ALBUMIN: 2.8
EXT ALT: 22
EXT AST: 25
EXT BILIRUBIN TOTAL: 0.8
EXT BNP: 299.6
EXT BUN: 13
EXT CALCIUM: 8.5
EXT CHLORIDE: 99
EXT CREATININE: 1.41 MG/DL
EXT GLUCOSE: 227
EXT HEMATOCRIT: 25.6
EXT HEMOGLOBIN: 7.5
EXT PLATELETS: 239
EXT POTASSIUM: 3.4
EXT PROTEIN TOTAL: 8.2
EXT SODIUM: 136 MMOL/L
EXT WBC: 6.96

## 2023-12-29 ENCOUNTER — TELEPHONE (OUTPATIENT)
Dept: TRANSPLANT | Facility: CLINIC | Age: 38
End: 2023-12-29
Payer: MEDICAID

## 2023-12-29 NOTE — TELEPHONE ENCOUNTER
External labs entered for Review  Labs completed on 12/28/23  Results routed to VAD Coordinator    BNP REMAINS PENDING

## 2023-12-29 NOTE — PROGRESS NOTES
S/W natty from [facility] (Ph) [phone]  INR not drawn w/ other labs on 12/28/23 . She will contact patient to r/s .    Also called patient girlfriend Darlyn Wright to r/s but no answer - unable to [facility].

## 2023-12-29 NOTE — TELEPHONE ENCOUNTER
Contacted [facility] (Ph) [phone] (Fx) [phone]   To obtain this week lab results   Was informed that BNP is still pending.   Fax number provided   Awaiting results.

## 2024-01-03 ENCOUNTER — ANTI-COAG VISIT (OUTPATIENT)
Dept: CARDIOLOGY | Facility: CLINIC | Age: 39
End: 2024-01-03
Payer: MEDICAID

## 2024-01-03 ENCOUNTER — OFFICE VISIT (OUTPATIENT)
Dept: TRANSPLANT | Facility: CLINIC | Age: 39
End: 2024-01-03
Attending: INTERNAL MEDICINE
Payer: MEDICAID

## 2024-01-03 ENCOUNTER — CLINICAL SUPPORT (OUTPATIENT)
Dept: TRANSPLANT | Facility: CLINIC | Age: 39
End: 2024-01-03
Payer: MEDICAID

## 2024-01-03 ENCOUNTER — HOSPITAL ENCOUNTER (OUTPATIENT)
Dept: PULMONOLOGY | Facility: CLINIC | Age: 39
Discharge: HOME | End: 2024-01-03
Payer: MEDICAID

## 2024-01-03 ENCOUNTER — TELEPHONE (OUTPATIENT)
Dept: TRANSPLANT | Facility: CLINIC | Age: 39
End: 2024-01-03
Payer: MEDICAID

## 2024-01-03 VITALS
WEIGHT: 190 LBS | BODY MASS INDEX: 23.62 KG/M2 | WEIGHT: 210 LBS | SYSTOLIC BLOOD PRESSURE: 72 MMHG | TEMPERATURE: 98 F | BODY MASS INDEX: 26.11 KG/M2 | HEIGHT: 75 IN | HEIGHT: 75 IN

## 2024-01-03 DIAGNOSIS — Z79.01 CHRONIC ANTICOAGULATION: ICD-10-CM

## 2024-01-03 DIAGNOSIS — G40.109 TEMPORAL LOBE SEIZURE: ICD-10-CM

## 2024-01-03 DIAGNOSIS — G40.309 EPILEPSY SEIZURE, GENERALIZED, CONVULSIVE: Primary | ICD-10-CM

## 2024-01-03 DIAGNOSIS — Z95.811 LVAD (LEFT VENTRICULAR ASSIST DEVICE) PRESENT: Primary | ICD-10-CM

## 2024-01-03 DIAGNOSIS — T82.7XXS INFECTION INVOLVING IMPLANTABLE CARDIOVERTER-DEFIBRILLATOR (ICD), SEQUELA: ICD-10-CM

## 2024-01-03 DIAGNOSIS — Z79.899 RECEIVING INOTROPIC MEDICATION: ICD-10-CM

## 2024-01-03 DIAGNOSIS — Z95.811 LVAD (LEFT VENTRICULAR ASSIST DEVICE) PRESENT: ICD-10-CM

## 2024-01-03 DIAGNOSIS — I50.84 CHF (NYHA CLASS IV, ACC/AHA STAGE D): ICD-10-CM

## 2024-01-03 DIAGNOSIS — I50.814 RIGHT HEART FAILURE SECONDARY TO LEFT HEART FAILURE: ICD-10-CM

## 2024-01-03 PROBLEM — B95.8 BACTEREMIA DUE TO STAPHYLOCOCCUS: Status: RESOLVED | Noted: 2023-07-23 | Resolved: 2024-01-03

## 2024-01-03 PROBLEM — I50.41 ACUTE COMBINED SYSTOLIC AND DIASTOLIC CONGESTIVE HEART FAILURE: Status: RESOLVED | Noted: 2020-11-05 | Resolved: 2024-01-03

## 2024-01-03 PROBLEM — T82.9XXA COMPLICATIONS DUE TO AUTOMATIC IMPLANTABLE CARDIOVERTER-DEFIBRILLATOR: Status: RESOLVED | Noted: 2023-12-06 | Resolved: 2024-01-03

## 2024-01-03 PROBLEM — I50.43 ACUTE ON CHRONIC COMBINED SYSTOLIC AND DIASTOLIC HEART FAILURE, NYHA CLASS 4: Status: RESOLVED | Noted: 2020-10-25 | Resolved: 2024-01-03

## 2024-01-03 PROBLEM — Z98.890 REQUIRED EMERGENT INTUBATION: Status: RESOLVED | Noted: 2023-08-31 | Resolved: 2024-01-03

## 2024-01-03 PROBLEM — R07.9 CHEST PAIN: Status: RESOLVED | Noted: 2023-07-28 | Resolved: 2024-01-03

## 2024-01-03 PROBLEM — L03.90 CELLULITIS: Status: RESOLVED | Noted: 2023-11-14 | Resolved: 2024-01-03

## 2024-01-03 PROBLEM — T82.118A ICD (IMPLANTABLE CARDIOVERTER-DEFIBRILLATOR) MALFUNCTION: Status: RESOLVED | Noted: 2023-11-08 | Resolved: 2024-01-03

## 2024-01-03 PROBLEM — R78.81 BACTEREMIA DUE TO STAPHYLOCOCCUS: Status: RESOLVED | Noted: 2023-07-23 | Resolved: 2024-01-03

## 2024-01-03 PROBLEM — Z00.6 EXAMINATION OF PARTICIPANT IN CLINICAL TRIAL: Status: RESOLVED | Noted: 2022-07-25 | Resolved: 2024-01-03

## 2024-01-03 PROCEDURE — 99213 OFFICE O/P EST LOW 20 MIN: CPT | Mod: PBBFAC,25 | Performed by: INTERNAL MEDICINE

## 2024-01-03 PROCEDURE — 93750 INTERROGATION VAD IN PERSON: CPT | Mod: S$PBB,,, | Performed by: INTERNAL MEDICINE

## 2024-01-03 PROCEDURE — 99999 PR PBB SHADOW E&M-EST. PATIENT-LVL I: CPT | Mod: PBBFAC,,,

## 2024-01-03 PROCEDURE — 99999PBSHW PR PBB SHADOW TECHNICAL ONLY FILED TO HB: Mod: PBBFAC,,,

## 2024-01-03 PROCEDURE — 94618 PULMONARY STRESS TESTING: CPT | Mod: 26,S$PBB,, | Performed by: INTERNAL MEDICINE

## 2024-01-03 PROCEDURE — 99999 PR PBB SHADOW E&M-EST. PATIENT-LVL III: CPT | Mod: PBBFAC,,, | Performed by: INTERNAL MEDICINE

## 2024-01-03 PROCEDURE — 99214 OFFICE O/P EST MOD 30 MIN: CPT | Mod: S$PBB,,, | Performed by: INTERNAL MEDICINE

## 2024-01-03 PROCEDURE — 1160F RVW MEDS BY RX/DR IN RCRD: CPT | Mod: CPTII,,, | Performed by: INTERNAL MEDICINE

## 2024-01-03 PROCEDURE — 1159F MED LIST DOCD IN RCRD: CPT | Mod: CPTII,,, | Performed by: INTERNAL MEDICINE

## 2024-01-03 PROCEDURE — 93793 ANTICOAG MGMT PT WARFARIN: CPT | Mod: ,,,

## 2024-01-03 PROCEDURE — 94618 PULMONARY STRESS TESTING: CPT | Mod: PBBFAC | Performed by: INTERNAL MEDICINE

## 2024-01-03 PROCEDURE — 3008F BODY MASS INDEX DOCD: CPT | Mod: CPTII,,, | Performed by: INTERNAL MEDICINE

## 2024-01-03 PROCEDURE — 93750 INTERROGATION VAD IN PERSON: CPT | Mod: PBBFAC | Performed by: INTERNAL MEDICINE

## 2024-01-03 PROCEDURE — 99211 OFF/OP EST MAY X REQ PHY/QHP: CPT | Mod: PBBFAC,27,25

## 2024-01-03 NOTE — PROGRESS NOTES
Subjective:   Patient ID:  Kevan Queen is a 38 y.o. male who presents for LVAD followup visit.    Implant Date:6/29/2022  Initials:JxP     Heartmate 3 RPM 5100     INR goal: 2-3   Bridge with Heparin/lovenox   Antiplatelets: ASA 81 (Alon Trial completed 7/24/2023)  Inotropes: Milrinone @ 0.25 mcg/kg/min in D5W        1/3/2024     2:33 PM   TXP JOY INTERROGATIONS   Type HeartMate3   Flow 4.3   Speed 5100   PI 6   Power (Serna) 3.6   LSL 4700   Pulsatility No Pulse   Interrogation of Ventricular assist device was performed with physician analysis of device parameters and review of device function. I have personally reviewed the interrogation findings and agree with findings as stated.     HPI  37 yo BM with stage D 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 He underwent RVAD removal and chest closure 6/30/2022.  He also completed a course of IV Abx for staph epi. He was weaned off  but he had to restarted due to RVF. He was eventually transitioned to milrinone (secondary to  shortage) now on 0.25 mcg/kg/min. He has a history of unclear syncope vs seizures and is followed by neuro for this and is on Keppra.      On 11/15/23 underwent removal of eroded ICD--no Lifevest (due to LVAD) and no plan to replace ICD as not requiring therapy post LVAD with concern for reinfection.  He has not had neurology f/u with seizure hx on keppra so coordinator to assist him with obtaining appt.    Review of Systems   Constitutional: Negative for chills, fever, malaise/fatigue, weight gain and weight loss.   Cardiovascular:  Positive for dyspnea on exertion. Negative for chest pain, claudication, irregular heartbeat, leg swelling, near-syncope, orthopnea, palpitations, paroxysmal nocturnal dyspnea and  "syncope.   Respiratory:  Negative for cough, shortness of breath, sputum production and wheezing.    Hematologic/Lymphatic: Negative for bleeding problem. Does not bruise/bleed easily.   Musculoskeletal:  Negative for falls.   Gastrointestinal:  Negative for change in bowel habit, hematemesis, hematochezia and melena.   Genitourinary:  Negative for hematuria.   Neurological:  Positive for seizures and weakness. Negative for brief paralysis, dizziness, focal weakness and headaches.       Objective:   Blood pressure (!) 72/0, temperature 97.8 °F (36.6 °C), temperature source Oral, height 6' 3" (1.905 m), weight 86.2 kg (190 lb).body mass index is 23.75 kg/m².  Doppler: 72  Physical Exam  Constitutional:       General: He is not in acute distress.     Appearance: He is well-developed. He is not ill-appearing, toxic-appearing or diaphoretic.      Comments: BP (!) 72/0 (BP Location: Left arm, Patient Position: Sitting) Comment (BP Method): doppler  Temp 97.8 °F (36.6 °C) (Oral)   Ht 6' 3" (1.905 m)   Wt 86.2 kg (190 lb)   BMI 23.75 kg/m²    HENT:      Head: Normocephalic and atraumatic.   Eyes:      General: No scleral icterus.        Right eye: No discharge.         Left eye: No discharge.      Conjunctiva/sclera: Conjunctivae normal.   Neck:      Thyroid: No thyromegaly.      Vascular: No JVD.      Trachea: No tracheal deviation.   Cardiovascular:      Comments: Normal LVAD sounds; DL 1  Muscle removed at site of ICD removal  Pulmonary:      Effort: Pulmonary effort is normal. No respiratory distress.      Breath sounds: Normal breath sounds. No wheezing or rales.   Abdominal:      General: Bowel sounds are normal. There is no distension.      Palpations: Abdomen is soft. There is no mass.      Tenderness: There is no abdominal tenderness. There is no guarding or rebound.   Musculoskeletal:         General: No tenderness.      Right lower leg: No edema.      Left lower leg: No edema.   Skin:     General: Skin is " warm and dry.   Neurological:      General: No focal deficit present.      Mental Status: He is alert. Mental status is at baseline.   Psychiatric:         Mood and Affect: Mood normal.         Behavior: Behavior normal.         Thought Content: Thought content normal.         Judgment: Judgment normal.       Labs reviewed as follows:  Lab Results   Component Value Date     (H) 01/03/2024     (L) 01/03/2024    K 4.2 01/03/2024    MG 1.9 01/03/2024    CL 93 (L) 01/03/2024    CO2 23 01/03/2024    PHOS 2.7 01/03/2024    BUN 12 01/03/2024    CREATININE 1.7 (H) 01/03/2024     (HH) 01/03/2024    HGBA1C 8.9 (H) 11/13/2023    AST 19 01/03/2024    ALT 13 01/03/2024    ALBUMIN 3.2 (L) 01/03/2024    PROT 8.5 (H) 01/03/2024    BILITOT 1.4 (H) 01/03/2024    WBC 7.99 01/03/2024    HGB 8.0 (L) 01/03/2024    HCT 28.4 (L) 01/03/2024    HCT 43 08/31/2023     01/03/2024    INR 4.4 (H) 01/03/2024    INR 2.50 12/12/2023    INR 1.0 08/31/2023     (H) 01/03/2024    TSH 0.869 08/31/2023    FREET4 0.96 04/13/2022    CHOL 118 (L) 08/31/2023    HDL 37 (L) 08/31/2023    LDLCALC 63.2 08/31/2023    TRIG 89 08/31/2023     1/3/2024 6 .9 meters    Assessment:      1. LVAD (left ventricular assist device) present    2. Right heart failure secondary to left heart failure-post LVAD surgery    3. CHF (NYHA class IV, ACC/AHA stage D)    4. Receiving inotropic medication    5. Infection involving implantable cardioverter-defibrillator (ICD),now s/p removal of device    6. Temporal lobe seizure    7. Chronic anticoagulation      Plan:   He needs f/u with Neurology.  Even if he has to see an neurologist off main campus he can undergo outpatient EEG including sleep EEG but will need wife with him due to LVAD.    There is no seroma and wound healed nicely.  There is missing muscle at site of explant hence the unusual appearance.    Patient is now NYHA IV since on home milrinone    Recommend 2 gram sodium restriction  and 1500cc fluid restriction.  Encourage physical activity with graded exercise program.  Requested patient to weigh themselves daily, and to notify us if their weight increases by more than 3 lbs in 1 day or 5 lbs in 1 week.     Listed for transplant: No    UNOS Patient Status  Functional Status: 70% - Cares for self: unable to carry on normal activity or active work  Physical Capacity: No Limitations  Working for Income: No  If no, reason not working: Disability and applying for SSI

## 2024-01-03 NOTE — PROGRESS NOTES
Patient was seen today in clinic. Backup controller charged and self test passed. Issued new right sided consolidation bag.      Equipment:  Any Equipment Issues: None noted     Emergency Bag  Emergency Equipment With Patient: Yes  Condition of emergency bag: Good  Contents of emergency bag: Backup controller, 2 fully charged batteries, 2 battery clips, reference cards    Maintenance Tracking  Patient has monthly checklist today: No  Patient correctly utilizing monthly checklist: No  Educated patient on utilizing monthly checklist correctly and importance of this: Yes    Equipment Inspection  Inspected patient's equipment today: Yes  Equipment clean and free of debris, including locking mechanism: Yes  Cleaned equipment today and educated patient on how to do this: Yes    Manual and visual inspection of driveline: No  Kinks, rescue tape, tears: No  Rescue tape applied: No  Clamshell repair: No  Perc lead repair: No    Backup Controller and Emergency Backup Battery  Backup controller serial number: [id number]  EBB S/N: [id number]  Manufacture date: 4/20/2022  EBB uses: 7  EBB due to be charged: 7/2024  EBB charged today and self test completed: Yes  Self test passed:  Yes  EBB expires and due to be changed: 4/2025  EBB changed today: No    Equipment Issued  Consolidation bag (Right): [id number]    Equipment Needs  Other equipment issues: None  Equipment given to patient today in clinic: Consolidation bag (1)  Discussed with MCS Coordinator and physician: Yes  Items Under Warranty Yes VAD Coordinator Notified Yes  Equipment loaned to patient today: No  It is medically necessary to ensure patient has properly functioning equipment and wearables to prevent infection, injury or death to patient.   Waveforms sent: No

## 2024-01-03 NOTE — PROCEDURES
1/3/2024     2:33 PM 11/15/2023     7:34 AM 11/15/2023     5:24 AM 11/14/2023     8:55 PM 11/14/2023     4:46 PM 11/14/2023     8:18 AM 11/14/2023     4:13 AM   TXP JOY INTERROGATIONS   Type HeartMate3         Flow 4.3         Speed 5100         PI 6         Power (Serna) 3.6         LSL 4700         Pulsatility No Pulse Intermittent pulse Intermittent pulse Intermittent pulse Intermittent pulse Intermittent pulse Intermittent pulse   }Interrogation of Ventricular assist device was performed with physician analysis of device parameters and review of device function. I have personally reviewed the interrogation findings and agree with findings as stated.

## 2024-01-03 NOTE — TELEPHONE ENCOUNTER
External labs entered for Review  Labs completed on 12/28/23  Results routed to VAD Coordinator      BNP RESULTS RECEIVED.

## 2024-01-03 NOTE — PROGRESS NOTES
Spoke with Darlyn Wright regarding recent INR results .  Patient questioned and verified correct dosage . Reported is okay and still taking cefadroxil -no recent changes .

## 2024-01-03 NOTE — LETTER
January 3, 2024        Inderjit Hendricks  [address]  [address]  [place] [address]  Phone: [phone]  Fax: [phone]     Josh Pulido  [address] [address]  Phone: [phone]  Fax: [phone]             [id number]  [address]  [place] [address]  Phone: [phone]   Patient: Kevan Queen   MR Number: [id number]   YOB: 1985   Date of Visit: 1/3/2024       Dear Dr. Inderjit Hendricks, Josh Pulido    Thank you for referring Kevan Queen to me for evaluation. Attached you will find relevant portions of my assessment and plan of care.    If you have questions, please do not hesitate to call me. I look forward to following Kevan Queen along with you.    Sincerely,    Lcua Lopez Jr, MD    Enclosure    If you would like to receive this communication electronically, please contact [email or web address] or [phone] to request Cross[employer] Link access.    Cross[employer] Link is a tool which provides read-only access to select patient information with whom you have a relationship. Its easy to use and provides real time access to review your patients record including encounter summaries, notes, results, and demographic information.    If you feel you have received this communication in error or would no longer like to receive these types of communications, please e-mail [email or web address]

## 2024-01-03 NOTE — PROGRESS NOTES
Date of Implant with Heartmate 3 LVAD: 6/29/22    PATIENT ARRIVED IN CLINIC:  Ambulatory   Accompanied by: wife and 2 kids  Complaints/reason for visit today: routine    Vitals  Temperature, oral:   Temp Readings from Last 1 Encounters:   01/03/24 97.8 °F (36.6 °C) (Oral)     Blood Pressure:   BP Readings from Last 3 Encounters:   01/03/24 (!) 72/0   11/15/23 (!) 84/0   09/11/23 (!) 82/0        VAD Interrogation:      1/3/2024     2:33 PM 11/15/2023     7:34 AM 11/15/2023     5:24 AM   TXP JOY INTERROGATIONS   Type HeartMate3     Flow 4.3     Speed 5100     PI 6     Power (Serna) 3.6     LSL 4700     Pulsatility No Pulse Intermittent pulse Intermittent pulse     HCT:   Lab Results   Component Value Date    HCT 28.4 (L) 01/03/2024    HCT 43 08/31/2023       Problems / Issues / Alarms with VAD if any: None noted  VAD Sounds: HM3 Smooth    VAD Binder With Patient: No  Reviewed VAD Numbers In Binder: No    Equipment:  Emergency Equipment With Patient: Yes  Any Equipment Issues: None noted   It is medically necessary to ensure patient has properly functioning equipment and wearables to prevent infection, injury or death to patient.     DLES Assessment:  Appearance of DLES: 1 with scab. Pt c/o occasional pain. On assessment, driveline is anchored pulling upwards on exit site. Patient endorses pain when I press inferior to DLES. When patient inhales, his DLES pulls upward. I reanchored driveline to exit to the side and anchor for driveline to go down.   Antibiotics: YES duricef 500mg BID, indefinitely  Velour: No  Manual & Visual Inspection Of Driveline: No kinks or tears noted  Stabilization Device In Use: yes, melendez securement device    Heartmate 3 Module Cable:  No yellow exposed and Attempted to unscrew modular cable to ensure it will be able to come lose in the event we ever need to change the modular cable while patient held the driveline in place so it would not move. Modular cable connection able to be unscrewed  and re-tightened. Instructed pt to perform this weekly.  DLES Dressing Care:   Frequency of Dressing Changes: every other day & daily kit   Pt In Need Of Management Kits?:yes -   4 Box of daily kit  It is medically necessary to have VAD management kits in order to prevent infection or to assist in the healing of an infected DLES.    Patient MyChart Questionnaire:        No data to display                 Assessment/ Quality of Life Survery:   Complaints Of Nausea / Vomiting: None noted    Appearance and Frequency Of Stools: normal and formed without blood & daily  Color Of Urine: clear/yellow  Pain: see above regarding DLES pain  Coping/Depression/Anxiety: coping okay  Sleep Habits: 8-9 hrs /night  Sleep Aids: None noted  Showering: Yes, reminded to change dressing immediately after drying off  Self- Care I have no problems with self- care  Mobility I have no problems walking about  Usual Activities I have no problems with performing my usual activities  Activity/Exercise: kids   Driving: No.  Additional Comments: wife states that patient has been having more seizures. Patient does not remember any of the seizures that wife is talking about.    Labs:    Chemistry        Component Value Date/Time     (L) 12/22/2023 1630     (L) 11/15/2023 0544    K 3.5 12/22/2023 1630    K 3.9 11/15/2023 0544    CL 98 11/15/2023 0544    CO2 20 (L) 12/22/2023 1630    CO2 28 11/15/2023 0544    BUN 8.4 (L) 12/22/2023 1630    BUN 19 11/15/2023 0544    CREATININE 1.38 (H) 12/22/2023 1630    CREATININE 1.3 11/15/2023 0544     (H) 11/15/2023 0544        Component Value Date/Time    CALCIUM 8.9 12/22/2023 1630    CALCIUM 9.2 11/15/2023 0544    ALKPHOS 171 (H) 12/22/2023 1630    ALKPHOS 157 (H) 11/15/2023 0544    ALKPHOS 157 (H) 11/15/2023 0544    AST 30 12/22/2023 1630    AST 38 11/15/2023 0544    AST 38 11/15/2023 0544    ALT 16 12/22/2023 1630    ALT 29 11/15/2023 0544    ALT 29 11/15/2023 0544    BILITOT 1.2 12/22/2023  1630    BILITOT 0.8 11/15/2023 0544    BILITOT 0.8 11/15/2023 0544    ESTGFRAFRICA >60.0 07/27/2022 1229    EGFRNONAA 54.2 (A) 07/27/2022 1229            Magnesium   Date Value Ref Range Status   11/15/2023 2.0 1.6 - 2.6 mg/dL Final     Magnesium Level   Date Value Ref Range Status   12/22/2023 1.80 1.60 - 2.60 mg/dL Final       Lab Results   Component Value Date    WBC 7.99 01/03/2024    HGB 8.0 (L) 01/03/2024    HCT 28.4 (L) 01/03/2024    MCV 65 (L) 01/03/2024     01/03/2024       Lab Results   Component Value Date    INR 4.4 (H) 01/03/2024    INR 2.9 (H) 12/22/2023    INR 2.50 12/12/2023    PROTIME 29.6 (H) 12/22/2023    PROTIME 15.0 (H) 08/31/2023    PROTIME 31.0 (H) 08/15/2023       BNP   Date Value Ref Range Status   01/03/2024 230 (H) 0 - 99 pg/mL Final     Comment:     Values of less than 100 pg/ml are consistent with non-CHF populations.   11/15/2023 67 0 - 99 pg/mL Final     Comment:     Values of less than 100 pg/ml are consistent with non-CHF populations.   11/13/2023 116 (H) 0 - 99 pg/mL Final     Comment:     Values of less than 100 pg/ml are consistent with non-CHF populations.       LD   Date Value Ref Range Status   01/03/2024 261 (H) 110 - 260 U/L Final     Comment:     Results are increased in hemolyzed samples.   11/15/2023 253 110 - 260 U/L Final     Comment:     Results are increased in hemolyzed samples.   11/14/2023 236 110 - 260 U/L Final     Comment:     Results are increased in hemolyzed samples.       Labs reviewed with patient: YES     Medication Reconciliation: per MA.  New Medication Detail Provided: no  Coumadin Managed by: [facility]    Education: Reviewed driveline care, emergency procedures, how to change the controller, alarms with patient, as well as discussed how to page the VAD coordinator in case of an emergency.   Educated patient/family that chest compressions are allowed in the event they are needed.    Reminded patient/caregiver not to touch their face  and to cover their mouth when they cough or sneeze.   Vaccines: Pt informed that we are encouraging all VAD patients to receive the Flu and COVID vaccines and boosters. Informed pt that they can take tylenol but should avoid other NSAIDs.       Plans/Needs: Routine f/u. Overall, patient is doing well.     ICD extraction wound has completely closed/healed. Due to the muscle debridement, there is no muscle in this area so patient has what looks like a pocket of fluid but it is not per Dr. Lopez and the OP note. Patient will most likely always have this appearance to the old ICD pocket.    Referral placed for Neurology/Epilepsy. He was last seen by Dr. Devi in 8/2022 in clinic. He was then seen inpatient 9/2023 after CVA and seizures. Patient has not had a f/u and wife reports active seizures at home. Dr. Lopez also gave the OK for EEG close to home if Robyn, caregiver, can stay with him overnight.     RTC in  with FLP.       Hurricane Season: No

## 2024-01-04 NOTE — PROCEDURES
Kevan Queen is a 38 y.o.  male patient, who presents for a 6 minute walk test ordered by MD Grecia.  The diagnosis is Left Ventricular Assist Device; Cardiomyopathy.  The patient's BMI is 26.2 kg/m2.  Predicted distance (lower limit of normal) is 576.3 meters.      Test Results:    The test was completed with stops. The patient stopped 1 time for a total of 16 seconds. The total time walked was 344 seconds. During walking, the patient reported:  Dyspnea, Lightheadedness. The patient used a wheelchair for assistance during testing.     01/03/2024---------Distance: 206.35 meters (677 feet)     O2 Sat % Supplemental Oxygen Heart Rate Blood Pressure Ria Scale   Pre-exercise  (Resting) 100 % Room Air 30 bpm Unable to obtain 4   During Exercise Unable to obtain Room Air Unable to obtain Unable to obtain 5-6   Post-exercise  (Recovery) Unable to obtain Room Air   3     Recovery Time: 480 seconds              The patient walked the first 15 feet in 5.00 seconds.    Performing nurse/tech: Sushma GOMEZ      PREVIOUS STUDY:   12/15/2022---------Distance: 381 meters (1250 feet)       O2 Sat % Supplemental Oxygen Heart Rate Blood Pressure Ria Scale   Pre-exercise  (Resting) 97 % Room Air 69 bpm Unable to obtain 0   During Exercise 98 % Room Air 71 bpm Unable to obtain 0   Post-exercise  (Recovery) 98 % Room Air  70 bpm          Recovery Time:  70 seconds              The patient walked the first 15 feet in 3.70 seconds.       CLINICAL INTERPRETATION:  Six minute walk distance is 206.35 meters (677 feet) with heavy dyspnea.  At rest, there was no significant desaturation while breathing room air.  Bradycardia was present prior to exercise.  The patient reported non-pulmonary symptoms during exercise.  Significant exercise impairment is likely due to cardiovascular causes and subjective symptoms.  The patient did complete the study, walking 344 seconds of the 360 second test.  Since the previous study in December  2022, exercise capacity is significantly worse.  Based upon age and body mass index, exercise capacity is less than predicted.

## 2024-01-09 ENCOUNTER — HOSPITAL ENCOUNTER (EMERGENCY)
Facility: HOSPITAL | Age: 39
Discharge: HOME | End: 2024-01-09
Attending: STUDENT IN AN ORGANIZED HEALTH CARE EDUCATION/TRAINING PROGRAM
Payer: MEDICAID

## 2024-01-09 ENCOUNTER — TELEPHONE (OUTPATIENT)
Dept: TRANSPLANT | Facility: CLINIC | Age: 39
End: 2024-01-09
Payer: MEDICAID

## 2024-01-09 VITALS
WEIGHT: 190 LBS | OXYGEN SATURATION: 95 % | SYSTOLIC BLOOD PRESSURE: 126 MMHG | BODY MASS INDEX: 23.62 KG/M2 | TEMPERATURE: 98 F | HEIGHT: 75 IN | DIASTOLIC BLOOD PRESSURE: 88 MMHG | RESPIRATION RATE: 16 BRPM | HEART RATE: 88 BPM

## 2024-01-09 DIAGNOSIS — R56.9 SEIZURE: ICD-10-CM

## 2024-01-09 DIAGNOSIS — Z79.4 TYPE 2 DIABETES MELLITUS WITH HYPERGLYCEMIA, WITH LONG-TERM CURRENT USE OF INSULIN: ICD-10-CM

## 2024-01-09 DIAGNOSIS — R73.9 HYPERGLYCEMIA: Primary | ICD-10-CM

## 2024-01-09 DIAGNOSIS — E11.65 TYPE 2 DIABETES MELLITUS WITH HYPERGLYCEMIA, WITH LONG-TERM CURRENT USE OF INSULIN: ICD-10-CM

## 2024-01-09 LAB
ALBUMIN SERPL-MCNC: 3.3 G/DL (ref 3.5–5)
ALBUMIN/GLOB SERPL: 0.8 RATIO (ref 1.1–2)
ALLENS TEST BLOOD GAS (OHS): YES
ALP SERPL-CCNC: 183 UNIT/L (ref 40–150)
ALT SERPL-CCNC: 14 UNIT/L (ref 0–55)
APPEARANCE UR: CLEAR
AST SERPL-CCNC: 21 UNIT/L (ref 5–34)
B-OH-BUTYR SERPL-MCNC: 0.5 MMOL/L
BACTERIA #/AREA URNS AUTO: ABNORMAL /HPF
BASE EXCESS BLD CALC-SCNC: -0.7 MMOL/L
BASOPHILS # BLD AUTO: 0.04 X10(3)/MCL
BASOPHILS NFR BLD AUTO: 0.6 %
BILIRUB SERPL-MCNC: 2 MG/DL
BILIRUB UR QL STRIP.AUTO: NEGATIVE
BLOOD GAS SAMPLE TYPE (OHS): ABNORMAL
BUN SERPL-MCNC: 13.9 MG/DL (ref 8.9–20.6)
CA-I BLD-SCNC: 1.08 MMOL/L (ref 1.12–1.23)
CALCIUM SERPL-MCNC: 8.7 MG/DL (ref 8.4–10.2)
CHLORIDE SERPL-SCNC: 98 MMOL/L (ref 98–107)
CK SERPL-CCNC: 430 U/L (ref 30–200)
CO2 BLDA-SCNC: 24.5 MMOL/L
CO2 SERPL-SCNC: 23 MMOL/L (ref 22–29)
COLOR UR AUTO: ABNORMAL
CREAT SERPL-MCNC: 1.53 MG/DL (ref 0.73–1.18)
DRAWN BY BLOOD GAS (OHS): ABNORMAL
EOSINOPHIL # BLD AUTO: 0.03 X10(3)/MCL (ref 0–0.9)
EOSINOPHIL NFR BLD AUTO: 0.4 %
ERYTHROCYTE [DISTWIDTH] IN BLOOD BY AUTOMATED COUNT: 23.3 % (ref 11.5–17)
ETHANOL SERPL-MCNC: <10 MG/DL
GFR SERPLBLD CREATININE-BSD FMLA CKD-EPI: 59 MLS/MIN/1.73/M2
GLOBULIN SER-MCNC: 4.4 GM/DL (ref 2.4–3.5)
GLUCOSE SERPL-MCNC: 518 MG/DL (ref 74–100)
GLUCOSE UR QL STRIP.AUTO: ABNORMAL
HCO3 BLDA-SCNC: 23.4 MMOL/L
HCT VFR BLD AUTO: 25.2 % (ref 42–52)
HGB BLD-MCNC: 7.3 G/DL (ref 14–18)
IMM GRANULOCYTES # BLD AUTO: 0.02 X10(3)/MCL (ref 0–0.04)
IMM GRANULOCYTES NFR BLD AUTO: 0.3 %
INHALED O2 CONCENTRATION: 21 %
INR PPP: 2.5
KETONES UR QL STRIP.AUTO: NEGATIVE
LEUKOCYTE ESTERASE UR QL STRIP.AUTO: NEGATIVE
LYMPHOCYTES # BLD AUTO: 1.46 X10(3)/MCL (ref 0.6–4.6)
LYMPHOCYTES NFR BLD AUTO: 21.6 %
MAGNESIUM SERPL-MCNC: 2 MG/DL (ref 1.6–2.6)
MCH RBC QN AUTO: 18.3 PG (ref 27–31)
MCHC RBC AUTO-ENTMCNC: 29 G/DL (ref 33–36)
MCV RBC AUTO: 63.3 FL (ref 80–94)
MONOCYTES # BLD AUTO: 0.61 X10(3)/MCL (ref 0.1–1.3)
MONOCYTES NFR BLD AUTO: 9 %
NEUTROPHILS # BLD AUTO: 4.6 X10(3)/MCL (ref 2.1–9.2)
NEUTROPHILS NFR BLD AUTO: 68.1 %
NITRITE UR QL STRIP.AUTO: NEGATIVE
NRBC BLD AUTO-RTO: 0.9 %
PCO2 BLDA: 36 MMHG (ref 20–50)
PH BLDA: 7.42 [PH] (ref 7.3–7.6)
PH UR STRIP.AUTO: 6 [PH]
PLATELET # BLD AUTO: 387 X10(3)/MCL (ref 130–400)
PLATELETS.RETICULATED NFR BLD AUTO: 5.6 % (ref 0.9–11.2)
PMV BLD AUTO: 9.3 FL (ref 7.4–10.4)
PO2 BLDA: 92 MMHG
POCT GLUCOSE: 387 MG/DL (ref 70–110)
POCT GLUCOSE: 461 MG/DL (ref 70–110)
POCT GLUCOSE: >500 MG/DL (ref 70–110)
POTASSIUM BLOOD GAS (OHS): 3.6 MMOL/L (ref 3.5–5)
POTASSIUM SERPL-SCNC: 4.2 MMOL/L (ref 3.5–5.1)
PROT SERPL-MCNC: 7.7 GM/DL (ref 6.4–8.3)
PROT UR QL STRIP.AUTO: ABNORMAL
PROTHROMBIN TIME: 26.6 SECONDS (ref 12.5–14.5)
RBC # BLD AUTO: 3.98 X10(6)/MCL (ref 4.7–6.1)
RBC #/AREA URNS AUTO: ABNORMAL /HPF
RBC UR QL AUTO: ABNORMAL
SAMPLE SITE BLOOD GAS (OHS): ABNORMAL
SAO2 % BLDA: 97.3 %
SODIUM BLOOD GAS (OHS): 127 MMOL/L (ref 137–145)
SODIUM SERPL-SCNC: 130 MMOL/L (ref 136–145)
SP GR UR STRIP.AUTO: 1.03 (ref 1–1.03)
SQUAMOUS #/AREA URNS LPF: ABNORMAL /HPF
TROPONIN I SERPL-MCNC: 0.1 NG/ML (ref 0–0.04)
UROBILINOGEN UR STRIP-ACNC: NORMAL
WBC # SPEC AUTO: 6.76 X10(3)/MCL (ref 4.5–11.5)
WBC #/AREA URNS AUTO: ABNORMAL /HPF

## 2024-01-09 PROCEDURE — 99900035 HC TECH TIME PER 15 MIN (STAT)

## 2024-01-09 PROCEDURE — 83735 ASSAY OF MAGNESIUM: CPT | Performed by: STUDENT IN AN ORGANIZED HEALTH CARE EDUCATION/TRAINING PROGRAM

## 2024-01-09 PROCEDURE — 81001 URINALYSIS AUTO W/SCOPE: CPT | Mod: 59 | Performed by: STUDENT IN AN ORGANIZED HEALTH CARE EDUCATION/TRAINING PROGRAM

## 2024-01-09 PROCEDURE — 93010 ELECTROCARDIOGRAM REPORT: CPT | Mod: ,,, | Performed by: INTERNAL MEDICINE

## 2024-01-09 PROCEDURE — 85610 PROTHROMBIN TIME: CPT | Performed by: STUDENT IN AN ORGANIZED HEALTH CARE EDUCATION/TRAINING PROGRAM

## 2024-01-09 PROCEDURE — 84484 ASSAY OF TROPONIN QUANT: CPT | Performed by: STUDENT IN AN ORGANIZED HEALTH CARE EDUCATION/TRAINING PROGRAM

## 2024-01-09 PROCEDURE — 82962 GLUCOSE BLOOD TEST: CPT

## 2024-01-09 PROCEDURE — 82010 KETONE BODYS QUAN: CPT | Performed by: STUDENT IN AN ORGANIZED HEALTH CARE EDUCATION/TRAINING PROGRAM

## 2024-01-09 PROCEDURE — 96372 THER/PROPH/DIAG INJ SC/IM: CPT | Mod: 59 | Performed by: STUDENT IN AN ORGANIZED HEALTH CARE EDUCATION/TRAINING PROGRAM

## 2024-01-09 PROCEDURE — 63600175 PHARM REV CODE 636 W HCPCS: Performed by: STUDENT IN AN ORGANIZED HEALTH CARE EDUCATION/TRAINING PROGRAM

## 2024-01-09 PROCEDURE — 82550 ASSAY OF CK (CPK): CPT | Performed by: STUDENT IN AN ORGANIZED HEALTH CARE EDUCATION/TRAINING PROGRAM

## 2024-01-09 PROCEDURE — 80177 DRUG SCRN QUAN LEVETIRACETAM: CPT | Performed by: STUDENT IN AN ORGANIZED HEALTH CARE EDUCATION/TRAINING PROGRAM

## 2024-01-09 PROCEDURE — 96361 HYDRATE IV INFUSION ADD-ON: CPT

## 2024-01-09 PROCEDURE — 80053 COMPREHEN METABOLIC PANEL: CPT | Performed by: STUDENT IN AN ORGANIZED HEALTH CARE EDUCATION/TRAINING PROGRAM

## 2024-01-09 PROCEDURE — 36600 WITHDRAWAL OF ARTERIAL BLOOD: CPT

## 2024-01-09 PROCEDURE — 96374 THER/PROPH/DIAG INJ IV PUSH: CPT

## 2024-01-09 PROCEDURE — 82803 BLOOD GASES ANY COMBINATION: CPT

## 2024-01-09 PROCEDURE — 99285 EMERGENCY DEPT VISIT HI MDM: CPT | Mod: 25

## 2024-01-09 PROCEDURE — 96376 TX/PRO/DX INJ SAME DRUG ADON: CPT

## 2024-01-09 PROCEDURE — 82077 ASSAY SPEC XCP UR&BREATH IA: CPT | Performed by: STUDENT IN AN ORGANIZED HEALTH CARE EDUCATION/TRAINING PROGRAM

## 2024-01-09 PROCEDURE — 96375 TX/PRO/DX INJ NEW DRUG ADDON: CPT

## 2024-01-09 PROCEDURE — 85025 COMPLETE CBC W/AUTO DIFF WBC: CPT | Performed by: STUDENT IN AN ORGANIZED HEALTH CARE EDUCATION/TRAINING PROGRAM

## 2024-01-09 PROCEDURE — 93005 ELECTROCARDIOGRAM TRACING: CPT

## 2024-01-09 RX ORDER — CYCLOBENZAPRINE HCL 10 MG
5 TABLET ORAL 3 TIMES DAILY PRN
Qty: 5 TABLET | Refills: 0 | Status: SHIPPED | OUTPATIENT
Start: 2024-01-09 | End: 2024-01-14

## 2024-01-09 RX ORDER — LEVETIRACETAM 500 MG/5ML
1000 INJECTION, SOLUTION, CONCENTRATE INTRAVENOUS
Status: COMPLETED | OUTPATIENT
Start: 2024-01-09 | End: 2024-01-09

## 2024-01-09 RX ADMIN — INSULIN HUMAN 10 UNITS: 100 INJECTION, SOLUTION PARENTERAL at 01:01

## 2024-01-09 RX ADMIN — LEVETIRACETAM 1000 MG: 100 INJECTION, SOLUTION INTRAVENOUS at 09:01

## 2024-01-09 RX ADMIN — INSULIN HUMAN 10 UNITS: 100 INJECTION, SOLUTION PARENTERAL at 10:01

## 2024-01-09 RX ADMIN — SODIUM CHLORIDE, POTASSIUM CHLORIDE, SODIUM LACTATE AND CALCIUM CHLORIDE 500 ML: 600; 310; 30; 20 INJECTION, SOLUTION INTRAVENOUS at 10:01

## 2024-01-09 RX ADMIN — INSULIN HUMAN 10 UNITS: 100 INJECTION, SOLUTION PARENTERAL at 12:01

## 2024-01-09 NOTE — TELEPHONE ENCOUNTER
Received a phone call from Dr. Lees at [facility]. Patient's glucose is >500. He admits to not checking his glucose at home d/t not having testing strips. simeon Carlson, reported patient having a seizure overnight but patient has no recollection of this. Robyn also reported the same thing in clinic but the patient did not remember. Patient has a Neuro f/u on 1/29. ER will give a prescription for glucose test strips.     All other labs and scans are stable. Patient to discharge home. Dr. Crum is aware via phone.

## 2024-01-09 NOTE — ED PROVIDER NOTES
Encounter Date: 1/9/2024       History     Chief Complaint   Patient presents with    Seizures     Pt reports seizure last night, now AAOx4, no distress. LVAD in place,      Kevan Queen is a 38 y.o. male with a complex past medical history including seizures and LVAD placement who presents to the ED for high blood sugar at home.  He also reportedly had a seizure at some point last night per family member although he does not recall that event.  He does report a history of seizures and states he is compliant with his Keppra.  He states he still has insulin at home but he ran out of test strips so he has been unable to check his sugars at home.  He reports some nausea that started yesterday with no vomiting.  He denies any chest pain or shortness of breath.         Review of patient's allergies indicates:   Allergen Reactions    Bumex [bumetanide] Hives    Torsemide Hives     Past Medical History:   Diagnosis Date    Acute respiratory failure with hypoxia 07/22/2023    Arthritis     Awareness alteration, transient 09/01/2022    Cardiomyopathy     CHF (congestive heart failure) 10/01/2020    COVID-19 06/03/2023    Diabetes mellitus     Dilated cardiomyopathy 10/26/2020    Drug abuse 10/2020    Headache 04/19/2023    Hyperglycemia 12/16/2022    Hyperosmolar hyperglycemic state (HHS) 05/25/2022    ICD (implantable cardioverter-defibrillator) in place 10/26/2020    Infected defibrillator now s/p removal Nov 2023 07/23/2023    Left ventricular assist device (LVAD) complication, initial encounter 06/05/2023    Muscle cramping 06/15/2022    Renal disorder     SOB (shortness of breath) 06/13/2022    Tingling in extremities 07/13/2022     Past Surgical History:   Procedure Laterality Date    APPLICATION OF WOUND VACUUM-ASSISTED CLOSURE DEVICE N/A 6/30/2022    Procedure: APPLICATION, WOUND VAC;  Surgeon: Luis F Paige MD;  Location: [facility] OR [address] [place] [address];  Service: Cardiovascular;  Laterality: N/A;  50 x 5 cm    CARDIAC  DEFIBRILLATOR PLACEMENT      ECHOCARDIOGRAM,TRANSESOPHAGEAL  11/9/2023    Procedure: Transesophageal echo (GUILLERMO) intra-procedure log documentation;  Surgeon: Eron Ivy MD;  Location: [facility] EP LAB;  Service: Cardiology;;    EXTRACTION, ELECTRODE LEAD Left 11/9/2023    Procedure: EXTRACTION, ELECTRODE LEAD;  Surgeon: ADALID Leon MD;  Location: [facility] EP LAB;  Service: Cardiology;  Laterality: Left;  INFECTION, LEAD EXTRACTION, GUILLERMO, BSCI, ANES, EH,   *NO CTS BACKUP*    IMPLANTATION OF RIGHT VENTRICULAR ASSIST DEVICE (RVAD) N/A 6/29/2022    Procedure: INSERTION, RVAD;  Surgeon: Luis F Paige MD;  Location: [facility] OR 2ND FLR;  Service: Cardiovascular;  Laterality: N/A;    INSERTION OF GRAFT TO PERICARDIUM Right 6/30/2022    Procedure: INSERTION-RIGHT VENTRICULAR ASSIST DEVICE;  Surgeon: Luis F Paige MD;  Location: [facility] OR 2ND FLR;  Service: Cardiovascular;  Laterality: Right;    IRRIGATION OF MEDIASTINUM  6/30/2022    Procedure: IRRIGATION, MEDIASTINUM;  Surgeon: Luis F Paige MD;  Location: [facility] OR 2ND FLR;  Service: Cardiovascular;;    LEFT VENTRICULAR ASSIST DEVICE Left 6/23/2022    Procedure: INSERTION-LEFT VENTRICULAR ASSIST DEVICE;  Surgeon: Luis F Paige MD;  Location: [facility] OR 2ND FLR;  Service: Cardiovascular;  Laterality: Left;    LEFT VENTRICULAR ASSIST DEVICE N/A 6/29/2022    Procedure: INSERTION-LEFT VENTRICULAR ASSIST DEVICE;  Surgeon: Luis F Paige MD;  Location: [facility] OR 2ND FLR;  Service: Cardiovascular;  Laterality: N/A;    REVISION OF SKIN POCKET FOR CARDIOVERTER-DEFIBRILLATOR  11/9/2023    Procedure: REVISION, SKIN POCKET, FOR CARDIOVERTER-DEFIBRILLATOR;  Surgeon: ADALID Leon MD;  Location: [facility] EP LAB;  Service: Cardiology;;    RIGHT HEART CATHETERIZATION Right 4/8/2022    Procedure: INSERTION, CATHETER, RIGHT HEART;  Surgeon: Luca Lopez Jr., MD;  Location: [facility] CATH LAB;  Service: Cardiology;  Laterality: Right;    RIGHT HEART CATHETERIZATION Right 4/19/2022     Procedure: INSERTION, CATHETER, RIGHT HEART;  Surgeon: Josh Pulido MD;  Location: [facility] CATH LAB;  Service: Cardiology;  Laterality: Right;    RIGHT HEART CATHETERIZATION Right 2022    Procedure: INSERTION, CATHETER, RIGHT HEART;  Surgeon: Dalia Crum MD;  Location: [facility] CATH LAB;  Service: Cardiology;  Laterality: Right;    RIGHT HEART CATHETERIZATION Right 10/31/2022    Procedure: INSERTION, CATHETER, RIGHT HEART;  Surgeon: Dalia Crum MD;  Location: [facility] CATH LAB;  Service: Cardiology;  Laterality: Right;    STERNAL WOUND CLOSURE N/A 2022    Procedure: CLOSURE, WOUND, STERNUM;  Surgeon: Luis F Paige MD;  Location: [facility] OR [address] [place] [address];  Service: Cardiovascular;  Laterality: N/A;     Family History   Problem Relation Age of Onset    Heart failure Father     Diverticulosis Brother     Heart attack Maternal Grandmother     Heart attack Maternal Grandfather      Social History     Tobacco Use    Smoking status: Former     Current packs/day: 0.00     Average packs/day: 0.5 packs/day for 16.6 years (8.3 ttl pk-yrs)     Types: Cigarettes     Start date: 10/1/2004     Quit date: 2021     Years since quittin.7    Smokeless tobacco: Never   Substance Use Topics    Alcohol use: Not Currently    Drug use: Not Currently     Types: Marijuana, MDMA (Ecstacy)     Review of Systems   Constitutional:  Negative for chills and fever.   HENT:  Negative for congestion.    Respiratory:  Negative for shortness of breath.    Cardiovascular:  Negative for chest pain and palpitations.   Gastrointestinal:  Positive for nausea. Negative for abdominal pain.   Genitourinary:  Negative for dysuria.   Neurological:  Positive for seizures.       Physical Exam     Initial Vitals   BP Pulse Resp Temp SpO2   24 0853 24 0853 24 0853 24 0854 24 0853   126/88 88 16 97.9 °F (36.6 °C) 95 %      MAP       --                Physical Exam    Nursing note and vitals reviewed.  Constitutional: He  appears well-developed and well-nourished.   HENT:   Head: Normocephalic and atraumatic.   Eyes: EOM are normal. Pupils are equal, round, and reactive to light.   Neck:   Normal range of motion.  Cardiovascular:  Normal rate.           Mechanical hum heard on auscultation   Abdominal: Abdomen is soft. Bowel sounds are normal.   Driveline in place, dressings clean/dry/intact   Musculoskeletal:         General: No tenderness. Normal range of motion.      Cervical back: Normal range of motion.     Neurological: He is alert and oriented to person, place, and time. GCS score is 15. GCS eye subscore is 4. GCS verbal subscore is 5. GCS motor subscore is 6.   Skin: Skin is warm and dry. Capillary refill takes less than 2 seconds.         ED Course   Procedures  Labs Reviewed   COMPREHENSIVE METABOLIC PANEL - Abnormal; Notable for the following components:       Result Value    Sodium Level 130 (*)     Glucose Level 518 (*)     Creatinine 1.53 (*)     Albumin Level 3.3 (*)     Globulin 4.4 (*)     Albumin/Globulin Ratio 0.8 (*)     Bilirubin Total 2.0 (*)     Alkaline Phosphatase 183 (*)     All other components within normal limits   TROPONIN I - Abnormal; Notable for the following components:    Troponin-I 0.100 (*)     All other components within normal limits   BETA - HYDROXYBUTYRATE, SERUM - Abnormal; Notable for the following components:    Beta Hydroxybutyrate 0.50 (*)     All other components within normal limits   URINALYSIS, REFLEX TO URINE CULTURE - Abnormal; Notable for the following components:    Protein, UA 1+ (*)     Glucose, UA 4+ (*)     Blood, UA Trace (*)     All other components within normal limits   BLOOD GAS - Abnormal; Notable for the following components:    Sodium, Blood Gas 127 (*)     Calcium Level Ionized 1.08 (*)     All other components within normal limits   PROTIME-INR - Abnormal; Notable for the following components:    PT 26.6 (*)     INR 2.5 (*)     All other components within normal  limits   CK - Abnormal; Notable for the following components:    Creatine Kinase 430 (*)     All other components within normal limits   CBC WITH DIFFERENTIAL - Abnormal; Notable for the following components:    RBC 3.98 (*)     Hgb 7.3 (*)     Hct 25.2 (*)     MCV 63.3 (*)     MCH 18.3 (*)     MCHC 29.0 (*)     RDW 23.3 (*)     All other components within normal limits   POCT GLUCOSE - Abnormal; Notable for the following components:    POCT Glucose >500 (*)     All other components within normal limits   POCT GLUCOSE - Abnormal; Notable for the following components:    POCT Glucose 461 (*)     All other components within normal limits   POCT GLUCOSE - Abnormal; Notable for the following components:    POCT Glucose 387 (*)     All other components within normal limits   MAGNESIUM - Normal   ALCOHOL,MEDICAL (ETHANOL) - Normal   CBC W/ AUTO DIFFERENTIAL    Narrative:     The following orders were created for panel order CBC auto differential.  Procedure                               Abnormality         Status                     ---------                               -----------         ------                     CBC with Differential[[id number]]       Abnormal            Final result                 Please view results for these tests on the individual orders.   LEVETIRACETAM  (KEPPRA) LEVEL          Imaging Results              CT Head Without Contrast (Final result)  Result time 01/09/24 11:17:42      Final result by Josh Tucker MD (01/09/24 11:17:42)                   Impression:      No acute intracranial findings or significant interval change compared to last month.      Electronically signed by: Josh Tucker  Date:    01/09/2024  Time:    11:17               Narrative:    EXAMINATION:  CT HEAD WITHOUT CONTRAST    CLINICAL HISTORY:  Mental status change, unknown cause;    TECHNIQUE:  CT imaging of the head performed from the skull base to the vertex without intravenous contrast.  mGycm. Automatic  exposure control, adjustment of mA/kV or iterative reconstruction technique was used to reduce radiation.    COMPARISON:  22 December 2023    FINDINGS:  There is no acute cortical infarct, hemorrhage or mass lesion.  There is no new parenchymal attenuation abnormality.  Ventricular size is stable.    Visualized paranasal sinuses and mastoid air cells are clear.                                       X-Ray Chest AP Portable (Final result)  Result time 01/09/24 09:20:49      Final result by Josh Tucker MD (01/09/24 09:20:49)                   Impression:      No significant interval change.      Electronically signed by: Josh Tucker  Date:    01/09/2024  Time:    09:20               Narrative:    EXAMINATION:  XR CHEST AP PORTABLE    CLINICAL HISTORY:  Unspecified convulsions    COMPARISON:  22 December 2023    FINDINGS:  Frontal view of the chest was obtained. Stable cardiomegaly with LVAD.  Stable right PICC.  Tip overlies the right subclavian vein.  Similar mild left basilar opacities with possible small pleural effusion.  No pneumothorax.                                       Medications   levETIRAcetam injection 1,000 mg (1,000 mg Intravenous Given 1/9/24 0940)   lactated ringers bolus 500 mL (0 mLs Intravenous Stopped 1/9/24 1115)   insulin regular injection 10 Units 0.1 mL (10 Units Intravenous Given 1/9/24 1007)   insulin regular injection 10 Units 0.1 mL (10 Units Intravenous Given 1/9/24 1200)   insulin regular injection 10 Units 0.1 mL (10 Units Subcutaneous Given 1/9/24 1340)     Medical Decision Making  Problems Addressed:  Hyperglycemia: acute illness or injury  Seizure: acute illness or injury    Amount and/or Complexity of Data Reviewed  Labs: ordered. Decision-making details documented in ED Course.  Radiology: ordered. Decision-making details documented in ED Course.    Risk  OTC drugs.  Prescription drug management.    Differential diagnosis (includes but is not limited to):   Seizure,  convulsive syncope, arrhythmia, electrolyte abnormalities, LVAD dysfunction, ACS, kidney injury, dehydration, hyperglycemia, DKA    MDM Narrative  38-year-old male presents for hyperglycemia and a reported seizure-like episode last night.  LVAD settings reviewed, settings and ED course, map appropriate at 72.  Patient appears to be perfusing well and is awake and alert with a GCS of 15 and brisk capillary refill.  Chest x-ray pending.  EKG pending.  Labs including electrolytes pending.  DKA labs pending.  Limited IV fluid bolus ordered.  Glucose significantly elevated, will likely require insulin treatment.  CT head pending to evaluate for acute traumatic injury.  Will discuss case with LVAD coordinator.  Patient may require transfer for further care given his LVAD and complex past medical history.    Update:  Labs and imaging reviewed.  Case discussed with LVAD coordinator including history and physical exam, vital signs, labs, and imaging as well as LVAD settings.  LVAD coordinator feels patient is stable for discharge home.  Recommends following up in clinic.  Patient has Neurology follow-up already scheduled for later this month as well with Dr. Shannon, I have stressed the importance of following up with his cardiologist for further evaluation and management of his symptoms.  I have also stressed the importance of following up with his LVAD team for further evaluation and management of his symptoms as well.  Patient and his mother who is present at the bedside feel comfortable with discharge home and state they are ready for discharge.  Patient was ambulatory at his baseline with a steady gait.  Patient is tolerating oral intake well.  Patient has been observed clinically in the emergency department for several hours with no return of any seizure-like activity.  Medication refills provided.  Glucose test strips Rx provided.    Dispo: Discharge    My independent radiology interpretation: as above  Point of care  US (independently performed and interpreted):   Decision rules/clinical scoring:     Sepsis Perfusion Assessment:     Amount and/or Complexity of Data Reviewed  Independent historian: parent   Summary of history: History corroborated by patient's mother at bedside  External data reviewed: notes from previous admissions, notes from previous ED visits, and notes from clinic visits  Summary of data reviewed: Prior records reviewd  Risk and benefits of testing: discussed   Labs: ordered and reviewed  Radiology: ordered and independent interpretation performed (see above or ED course)  ECG/medicine tests: ordered and independent interpretation performed (see above or ED course)  Discussion of management or test interpretation with external provider(s): discussed with LVAD coordinator consultant   Summary of discussion: as above    Risk  Parenteral controlled substances   Drug therapy requiring intense monitoring for toxicity   Decision regarding hospitalization  Shared decision making     Critical Care  none    Data Reviewed/Counseling: I have personally reviewed the patient's vital signs, nursing notes, and other relevant tests, information, and imaging. I had a detailed discussion regarding the historical points, exam findings, and any diagnostic results supporting the discharge diagnosis. I personally performed the history, PE, MDM and procedures as documented above and agree with the scribe's documentation.    Portions of this note were dictated using voice recognition software. Although it was reviewed for accuracy, some inherent voice recognition errors may have occurred and may be present in this document.            ED Course as of 01/09/24 1536   Tue Jan 09, 2024   0915 X-Ray Chest AP Portable  Independently visualized/reviewed by me during the ED visit.  - LVAD in place, PICC in place, overall similar to previous [MC]   1003 CO2: 23  Anion gap 8   [MC]   1003 POC PH: 7.420 [MC]   1004 Troponin I(!):  0.100  Improved from previous []   1132 LVAD settings:  - Speed 5100  - Flow 4.2  - PI 4.1  - Pow 3.6W []   1135 EKG independently interpreted by me.  EKG: SR @ 106, , LBBB, Qtc 650 []   1200 Case discussed with Shena LVAD coordinator. She is very aware of the patient and his history. Patient was seen in clinic very recently. Patient is stable for discharge home and they will see him in follow up. Patient has follow up with neurology scheduled as well with Dr. Devi. []   1346 I have reassessed the patient.  Patient is resting comfortably, no acute distress.  Vital signs stable.  Discussed all results including incidental findings.  Discussed need for follow up and discussed return precautions.  Answered all questions at this time.  Hemodynamically stable for continued outpatient management. Patient verbalized understanding and agreed to plan.      []      ED Course User Index  [] Eron Lees MD                             Clinical Impression:  Final diagnoses:  [R56.9] Seizure  [R73.9] Hyperglycemia (Primary)          ED Disposition Condition    Discharge Stable          ED Prescriptions       Medication Sig Dispense Start Date End Date Auth. Provider    cyclobenzaprine (FLEXERIL) 10 MG tablet Take 0.5 tablets (5 mg total) by mouth 3 (three) times daily as needed for Muscle spasms. 5 tablet 1/9/2024 1/14/2024 Eron Lees MD    blood sugar diagnostic Strp  (Status: Discontinued) Use to test blood glucose 4 (four) times daily. 200 each 1/9/2024 1/9/2024 Eron Lees MD    blood sugar diagnostic Strp Use to test blood glucose 4 (four) times daily. 200 each 1/9/2024 -- Eron Lees MD          Follow-up Information       Follow up With Specialties Details Why Contact Info Additional Information    Rosio Armendariz, FNP Nurse Practitioner In 2 days  [address] [place] [id number] [phone]       Haider Devi MD Neurology Schedule an  appointment as soon as possible for a visit   [address] [facility] [address]  [phone]       [id number] Schedule an appointment as soon as possible for a visit   [address] [facility] [id number] [phone] Cardiology Services Clinics - Main Building, Atrium 3rd Floor Please park in [facility] and use Atrium elevator    [facility] - Emergency Dept Emergency Medicine  If symptoms worsen [facility] [facility] [id number]  [phone]              Eron Lees MD  01/09/24 1532

## 2024-01-09 NOTE — DISCHARGE INSTRUCTIONS
Please follow up with your primary care physician in 2-3 days.  If you do not have a primary care physician, you may call [phone] to be assigned to a primary care provider.    Your visit in the emergency department is NOT definitive care - please follow-up with your primary care doctor and/or specialist within 1-2 days.  Please return if you have any worsening in your condition or if you have any other concerns.    If you had radiology exams like an XRAY or CT in the emergency Department the interpretation on them may be preliminary - there may be less time sensitive findings on the reports. Please obtain these reports within 24 hours from the hospital or by using the skinny for the portal on your mobile phone to access records.  Bring these to your primary care doctor and/or specialist for further review of incidental findings.    Please review any LAB WORK from your visit today with your primary care physician.    Please return to the emergency department if you develop any worsening symptoms, fever, chest pain, difficulty breathing, or any other new symptoms or concerns.      Thank you for allowing us to take care of you today.

## 2024-01-10 ENCOUNTER — ANTI-COAG VISIT (OUTPATIENT)
Dept: CARDIOLOGY | Facility: CLINIC | Age: 39
End: 2024-01-10
Payer: MEDICAID

## 2024-01-10 DIAGNOSIS — Z95.811 LVAD (LEFT VENTRICULAR ASSIST DEVICE) PRESENT: Primary | ICD-10-CM

## 2024-01-10 LAB — LEVETIRACETAM SERPL-MCNC: 41.3 MCG/ML (ref 10–40)

## 2024-01-10 PROCEDURE — 93793 ANTICOAG MGMT PT WARFARIN: CPT | Mod: ,,,

## 2024-01-11 ENCOUNTER — TELEPHONE (OUTPATIENT)
Dept: TRANSPLANT | Facility: CLINIC | Age: 39
End: 2024-01-11
Payer: MEDICAID

## 2024-01-11 NOTE — TELEPHONE ENCOUNTER
Contacted [facility] (Ph) [phone] (Fx) [phone] to obtain lab results  Patient has not visited lab since 12/28/23. This information was relayed to VAD coordinator.

## 2024-01-17 NOTE — PROGRESS NOTES
[facility]16 missed lab appointment to tomorrow 1/18/24

## 2024-01-18 LAB — EXT BNP: 373.2

## 2024-01-19 ENCOUNTER — DOCUMENTATION ONLY (OUTPATIENT)
Dept: TRANSPLANT | Facility: CLINIC | Age: 39
End: 2024-01-19
Payer: MEDICAID

## 2024-01-19 ENCOUNTER — ANTI-COAG VISIT (OUTPATIENT)
Dept: CARDIOLOGY | Facility: CLINIC | Age: 39
End: 2024-01-19
Payer: MEDICAID

## 2024-01-19 DIAGNOSIS — Z95.811 LVAD (LEFT VENTRICULAR ASSIST DEVICE) PRESENT: Primary | ICD-10-CM

## 2024-01-19 LAB
EXT ALBUMIN: 3
EXT ALT: 32
EXT AST: 33
EXT BILIRUBIN TOTAL: 1.8
EXT BUN: 11
EXT CALCIUM: 8.8
EXT CHLORIDE: 98
EXT CREATININE: 1.64 MG/DL
EXT GLUCOSE: 386
EXT HEMATOCRIT: 25.4
EXT HEMOGLOBIN: 7.3
EXT PLATELETS: 276
EXT POTASSIUM: 3.6
EXT PROTEIN TOTAL: 8.2
EXT SODIUM: 134 MMOL/L
EXT WBC: 5.35
INR PPP: 4.6

## 2024-01-19 PROCEDURE — 93793 ANTICOAG MGMT PT WARFARIN: CPT | Mod: ,,,

## 2024-01-19 NOTE — PROGRESS NOTES
Ms. Wright questioned regarding elevated INR. Proper warfarin dosing confirmed. She reported that the patient is currently has c/o cold symptoms. In addition, she reports that the patient is experiencing pain and drainage around his driveline. She denied other changes in medications, diet, and health. She also denied s/sx of bleeding. Ms. Wright advised that the patient could possibly have an infection at the driveline site, and she should notify LVAD. Ms. Wright also advised the patient should visit ED in the event of s/sx of bleeding.

## 2024-01-22 ENCOUNTER — TELEPHONE (OUTPATIENT)
Dept: TRANSPLANT | Facility: CLINIC | Age: 39
End: 2024-01-22
Payer: MEDICAID

## 2024-01-22 NOTE — PROGRESS NOTES
1/22/24 wife called to reschedule lab draw to 1/23, states Patient not feeling well, having pain at drive line site--states she spoke with LVAD team, states if needed she will take him to ED

## 2024-01-22 NOTE — TELEPHONE ENCOUNTER
External labs entered for Review  Labs completed on 01/18/24  Results routed to VAD Coordinator    Bnp results received.

## 2024-01-23 ENCOUNTER — TELEPHONE (OUTPATIENT)
Dept: TRANSPLANT | Facility: CLINIC | Age: 39
End: 2024-01-23
Payer: MEDICAID

## 2024-01-23 ENCOUNTER — HOSPITAL ENCOUNTER (EMERGENCY)
Facility: HOSPITAL | Age: 39
Discharge: HOME | End: 2024-01-23
Attending: STUDENT IN AN ORGANIZED HEALTH CARE EDUCATION/TRAINING PROGRAM
Payer: MEDICAID

## 2024-01-23 VITALS
DIASTOLIC BLOOD PRESSURE: 90 MMHG | SYSTOLIC BLOOD PRESSURE: 160 MMHG | HEIGHT: 75 IN | OXYGEN SATURATION: 99 % | RESPIRATION RATE: 17 BRPM | BODY MASS INDEX: 23.63 KG/M2 | WEIGHT: 190.06 LBS | TEMPERATURE: 98 F | HEART RATE: 108 BPM

## 2024-01-23 DIAGNOSIS — R73.9 HYPERGLYCEMIA: ICD-10-CM

## 2024-01-23 DIAGNOSIS — Z95.811 LVAD (LEFT VENTRICULAR ASSIST DEVICE) PRESENT: Primary | ICD-10-CM

## 2024-01-23 LAB
ALBUMIN SERPL-MCNC: 3.3 G/DL (ref 3.5–5)
ALBUMIN/GLOB SERPL: 0.8 RATIO (ref 1.1–2)
ALLENS TEST BLOOD GAS (OHS): YES
ALP SERPL-CCNC: 172 UNIT/L (ref 40–150)
ALT SERPL-CCNC: 25 UNIT/L (ref 0–55)
APPEARANCE UR: CLEAR
AST SERPL-CCNC: 40 UNIT/L (ref 5–34)
B-OH-BUTYR SERPL-MCNC: 0.1 MMOL/L
BACTERIA #/AREA URNS AUTO: ABNORMAL /HPF
BASE EXCESS BLD CALC-SCNC: 3.5 MMOL/L (ref -2–2)
BASOPHILS # BLD AUTO: 0.04 X10(3)/MCL
BASOPHILS NFR BLD AUTO: 0.5 %
BILIRUB SERPL-MCNC: 1.7 MG/DL
BILIRUB UR QL STRIP.AUTO: NEGATIVE
BLOOD GAS SAMPLE TYPE (OHS): ABNORMAL
BUN SERPL-MCNC: 13.3 MG/DL (ref 8.9–20.6)
CA-I BLD-SCNC: 1.11 MMOL/L (ref 1.12–1.23)
CALCIUM SERPL-MCNC: 8.9 MG/DL (ref 8.4–10.2)
CHLORIDE SERPL-SCNC: 99 MMOL/L (ref 98–107)
CO2 BLDA-SCNC: 29.9 MMOL/L
CO2 SERPL-SCNC: 25 MMOL/L (ref 22–29)
COHGB MFR BLDA: 3 % (ref 0.5–1.5)
COLOR UR AUTO: YELLOW
CREAT SERPL-MCNC: 1.62 MG/DL (ref 0.73–1.18)
DRAWN BY BLOOD GAS (OHS): ABNORMAL
EOSINOPHIL # BLD AUTO: 0.08 X10(3)/MCL (ref 0–0.9)
EOSINOPHIL NFR BLD AUTO: 1.1 %
ERYTHROCYTE [DISTWIDTH] IN BLOOD BY AUTOMATED COUNT: 23.7 % (ref 11.5–17)
FLUAV AG UPPER RESP QL IA.RAPID: NOT DETECTED
FLUBV AG UPPER RESP QL IA.RAPID: NOT DETECTED
GFR SERPLBLD CREATININE-BSD FMLA CKD-EPI: 55 MLS/MIN/1.73/M2
GLOBULIN SER-MCNC: 4.2 GM/DL (ref 2.4–3.5)
GLUCOSE SERPL-MCNC: 234 MG/DL (ref 74–100)
GLUCOSE UR QL STRIP.AUTO: NORMAL
HCO3 BLDA-SCNC: 28.5 MMOL/L (ref 22–26)
HCT VFR BLD AUTO: 24.7 % (ref 42–52)
HGB BLD-MCNC: 7 G/DL (ref 14–18)
IMM GRANULOCYTES # BLD AUTO: 0.02 X10(3)/MCL (ref 0–0.04)
IMM GRANULOCYTES NFR BLD AUTO: 0.3 %
KETONES UR QL STRIP.AUTO: NEGATIVE
LACTATE SERPL-SCNC: 1.5 MMOL/L (ref 0.5–2.2)
LACTATE SERPL-SCNC: 3.6 MMOL/L (ref 0.5–2.2)
LEUKOCYTE ESTERASE UR QL STRIP.AUTO: NEGATIVE
LYMPHOCYTES # BLD AUTO: 2.02 X10(3)/MCL (ref 0.6–4.6)
LYMPHOCYTES NFR BLD AUTO: 27.1 %
MAGNESIUM SERPL-MCNC: 1.7 MG/DL (ref 1.6–2.6)
MCH RBC QN AUTO: 17.2 PG (ref 27–31)
MCHC RBC AUTO-ENTMCNC: 28.3 G/DL (ref 33–36)
MCV RBC AUTO: 60.8 FL (ref 80–94)
METHGB MFR BLDA: 0.3 % (ref 0.4–1.5)
MONOCYTES # BLD AUTO: 0.75 X10(3)/MCL (ref 0.1–1.3)
MONOCYTES NFR BLD AUTO: 10.1 %
NEUTROPHILS # BLD AUTO: 4.55 X10(3)/MCL (ref 2.1–9.2)
NEUTROPHILS NFR BLD AUTO: 60.9 %
NITRITE UR QL STRIP.AUTO: NEGATIVE
NRBC BLD AUTO-RTO: 0.4 %
O2 HB BLOOD GAS (OHS): 93.7 % (ref 94–97)
OXYHGB MFR BLDA: 6.7 G/DL (ref 12–16)
PCO2 BLDA: 45 MMHG (ref 35–45)
PH BLDA: 7.41 [PH] (ref 7.35–7.45)
PH UR STRIP.AUTO: 6 [PH]
PLATELET # BLD AUTO: 179 X10(3)/MCL (ref 130–400)
PLATELETS.RETICULATED NFR BLD AUTO: 3.9 % (ref 0.9–11.2)
PMV BLD AUTO: ABNORMAL FL
PO2 BLDA: 74 MMHG (ref 80–100)
POCT GLUCOSE: 240 MG/DL (ref 70–110)
POTASSIUM BLOOD GAS (OHS): 2.9 MMOL/L (ref 3.5–5)
POTASSIUM SERPL-SCNC: 3.2 MMOL/L (ref 3.5–5.1)
PROT SERPL-MCNC: 7.5 GM/DL (ref 6.4–8.3)
PROT UR QL STRIP.AUTO: ABNORMAL
RBC # BLD AUTO: 4.06 X10(6)/MCL (ref 4.7–6.1)
RBC #/AREA URNS AUTO: ABNORMAL /HPF
RBC UR QL AUTO: ABNORMAL
RSV A 5' UTR RNA NPH QL NAA+PROBE: NOT DETECTED
SAMPLE SITE BLOOD GAS (OHS): ABNORMAL
SAO2 % BLDA: 94.8 %
SARS-COV-2 RNA RESP QL NAA+PROBE: NOT DETECTED
SODIUM BLOOD GAS (OHS): 129 MMOL/L (ref 137–145)
SODIUM SERPL-SCNC: 135 MMOL/L (ref 136–145)
SP GR UR STRIP.AUTO: 1.01 (ref 1–1.03)
SQUAMOUS #/AREA URNS LPF: ABNORMAL /HPF
TROPONIN I SERPL-MCNC: 0.09 NG/ML (ref 0–0.04)
UROBILINOGEN UR STRIP-ACNC: 4
WBC # SPEC AUTO: 7.46 X10(3)/MCL (ref 4.5–11.5)
WBC #/AREA URNS AUTO: ABNORMAL /HPF

## 2024-01-23 PROCEDURE — 84484 ASSAY OF TROPONIN QUANT: CPT | Performed by: STUDENT IN AN ORGANIZED HEALTH CARE EDUCATION/TRAINING PROGRAM

## 2024-01-23 PROCEDURE — 82010 KETONE BODYS QUAN: CPT | Performed by: STUDENT IN AN ORGANIZED HEALTH CARE EDUCATION/TRAINING PROGRAM

## 2024-01-23 PROCEDURE — 80053 COMPREHEN METABOLIC PANEL: CPT | Performed by: STUDENT IN AN ORGANIZED HEALTH CARE EDUCATION/TRAINING PROGRAM

## 2024-01-23 PROCEDURE — 0241U COVID/RSV/FLU A&B PCR: CPT | Performed by: STUDENT IN AN ORGANIZED HEALTH CARE EDUCATION/TRAINING PROGRAM

## 2024-01-23 PROCEDURE — 82803 BLOOD GASES ANY COMBINATION: CPT

## 2024-01-23 PROCEDURE — 25000003 PHARM REV CODE 250: Performed by: STUDENT IN AN ORGANIZED HEALTH CARE EDUCATION/TRAINING PROGRAM

## 2024-01-23 PROCEDURE — 81001 URINALYSIS AUTO W/SCOPE: CPT | Performed by: STUDENT IN AN ORGANIZED HEALTH CARE EDUCATION/TRAINING PROGRAM

## 2024-01-23 PROCEDURE — 25500020 PHARM REV CODE 255: Performed by: STUDENT IN AN ORGANIZED HEALTH CARE EDUCATION/TRAINING PROGRAM

## 2024-01-23 PROCEDURE — 85025 COMPLETE CBC W/AUTO DIFF WBC: CPT | Performed by: STUDENT IN AN ORGANIZED HEALTH CARE EDUCATION/TRAINING PROGRAM

## 2024-01-23 PROCEDURE — 99900035 HC TECH TIME PER 15 MIN (STAT)

## 2024-01-23 PROCEDURE — 83605 ASSAY OF LACTIC ACID: CPT | Performed by: STUDENT IN AN ORGANIZED HEALTH CARE EDUCATION/TRAINING PROGRAM

## 2024-01-23 PROCEDURE — 87040 BLOOD CULTURE FOR BACTERIA: CPT | Performed by: STUDENT IN AN ORGANIZED HEALTH CARE EDUCATION/TRAINING PROGRAM

## 2024-01-23 PROCEDURE — 99285 EMERGENCY DEPT VISIT HI MDM: CPT | Mod: 25

## 2024-01-23 PROCEDURE — 36600 WITHDRAWAL OF ARTERIAL BLOOD: CPT

## 2024-01-23 PROCEDURE — 83735 ASSAY OF MAGNESIUM: CPT | Performed by: STUDENT IN AN ORGANIZED HEALTH CARE EDUCATION/TRAINING PROGRAM

## 2024-01-23 PROCEDURE — 82962 GLUCOSE BLOOD TEST: CPT

## 2024-01-23 RX ADMIN — IOPAMIDOL 100 ML: 755 INJECTION, SOLUTION INTRAVENOUS at 06:01

## 2024-01-23 RX ADMIN — SODIUM CHLORIDE 1000 ML: 9 INJECTION, SOLUTION INTRAVENOUS at 05:01

## 2024-01-23 NOTE — ED PROVIDER NOTES
Encounter Date: 1/23/2024       History     Chief Complaint   Patient presents with    LVAD Complication      Pt family reports LVAD site is infected with leakage from site near the skin. Pt GCS 15 currently but wife states he was altered mental status at home.      This is a 38-year-old male history of LVAD presents emergency department chief complaint of drainage around LVAD site.  Denies any fever chills.  Reports drainage has been ongoing for proximally week.  Does not know with the drainage looks like.  Reports his wife would no more.  States he had some nausea and vomiting several days ago x1.  Dry heave earlier today but denies any other complaints.  States he feels well at this time a little tired but notes that it is 3:00 a.m..  Has come in the emergency department further evaluation management.      Both nursing myself attempted to contact wife, unsuccessful.  No answer..    The history is provided by the patient.     Review of patient's allergies indicates:   Allergen Reactions    Bumex [bumetanide] Hives    Torsemide Hives     Past Medical History:   Diagnosis Date    Acute respiratory failure with hypoxia 07/22/2023    Arthritis     Awareness alteration, transient 09/01/2022    Cardiomyopathy     CHF (congestive heart failure) 10/01/2020    COVID-19 06/03/2023    Diabetes mellitus     Dilated cardiomyopathy 10/26/2020    Drug abuse 10/2020    Headache 04/19/2023    Hyperglycemia 12/16/2022    Hyperosmolar hyperglycemic state (HHS) 05/25/2022    ICD (implantable cardioverter-defibrillator) in place 10/26/2020    Infected defibrillator now s/p removal Nov 2023 07/23/2023    Left ventricular assist device (LVAD) complication, initial encounter 06/05/2023    Muscle cramping 06/15/2022    Renal disorder     SOB (shortness of breath) 06/13/2022    Tingling in extremities 07/13/2022     Past Surgical History:   Procedure Laterality Date    APPLICATION OF WOUND VACUUM-ASSISTED CLOSURE DEVICE N/A 6/30/2022     Procedure: APPLICATION, WOUND VAC;  Surgeon: Luis F Paige MD;  Location: [facility] OR [facility] FLR;  Service: Cardiovascular;  Laterality: N/A;  50 x 5 cm    CARDIAC DEFIBRILLATOR PLACEMENT      ECHOCARDIOGRAM,TRANSESOPHAGEAL  11/9/2023    Procedure: Transesophageal echo (GUILLERMO) intra-procedure log documentation;  Surgeon: Eron Ivy MD;  Location: [facility] EP LAB;  Service: Cardiology;;    EXTRACTION, ELECTRODE LEAD Left 11/9/2023    Procedure: EXTRACTION, ELECTRODE LEAD;  Surgeon: ADALID Leon MD;  Location: [facility] EP LAB;  Service: Cardiology;  Laterality: Left;  INFECTION, LEAD EXTRACTION, GUILLERMO, BSCI, ANES, EH,   *NO CTS BACKUP*    IMPLANTATION OF RIGHT VENTRICULAR ASSIST DEVICE (RVAD) N/A 6/29/2022    Procedure: INSERTION, RVAD;  Surgeon: Luis F Paige MD;  Location: [facility] OR [facility] FLR;  Service: Cardiovascular;  Laterality: N/A;    INSERTION OF GRAFT TO PERICARDIUM Right 6/30/2022    Procedure: INSERTION-RIGHT VENTRICULAR ASSIST DEVICE;  Surgeon: Luis F Paige MD;  [facility];  Service: Cardiovascular;  Laterality: Right;    IRRIGATION OF MEDIASTINUM  6/30/2022    Procedure: IRRIGATION, MEDIASTINUM;  Surgeon: Luis F Paige MD;  [facility];  Service: Cardiovascular;;    LEFT VENTRICULAR ASSIST DEVICE Left 6/23/2022    Procedure: INSERTION-LEFT VENTRICULAR ASSIST DEVICE;  Surgeon: Luis F Paige MD;  Location: [facility] OR [facility] FLR;  Service: Cardiovascular;  Laterality: Left;    LEFT VENTRICULAR ASSIST DEVICE N/A 6/29/2022    Procedure: INSERTION-LEFT VENTRICULAR ASSIST DEVICE;  Surgeon: Luis F Paige MD;  Location: [facility] OR [facility] FLR;  Service: Cardiovascular;  Laterality: N/A;    REVISION OF SKIN POCKET FOR CARDIOVERTER-DEFIBRILLATOR  11/9/2023    Procedure: REVISION, SKIN POCKET, FOR CARDIOVERTER-DEFIBRILLATOR;  Surgeon: ADALID Leon MD;  Location: [facility] EP LAB;  Service: Cardiology;;    RIGHT HEART CATHETERIZATION Right 4/8/2022    Procedure: INSERTION, CATHETER, RIGHT HEART;   Surgeon: Luca Lopez Jr., MD;  Location: [facility] CATH LAB;  Service: Cardiology;  Laterality: Right;    RIGHT HEART CATHETERIZATION Right 2022    Procedure: INSERTION, CATHETER, RIGHT HEART;  Surgeon: Josh Pulido MD;  Location: [facility] CATH LAB;  Service: Cardiology;  Laterality: Right;    RIGHT HEART CATHETERIZATION Right 2022    Procedure: INSERTION, CATHETER, RIGHT HEART;  Surgeon: Dalia Crum MD;  Location: [facility] CATH LAB;  Service: Cardiology;  Laterality: Right;    RIGHT HEART CATHETERIZATION Right 10/31/2022    Procedure: INSERTION, CATHETER, RIGHT HEART;  Surgeon: Dalia Crum MD;  Location: [facility] CATH LAB;  Service: Cardiology;  Laterality: Right;    STERNAL WOUND CLOSURE N/A 2022    Procedure: CLOSURE, WOUND, STERNUM;  Surgeon: Luis F Paige MD;  [facility];  Service: Cardiovascular;  Laterality: N/A;     Family History   Problem Relation Age of Onset    Heart failure Father     Diverticulosis Brother     Heart attack Maternal Grandmother     Heart attack Maternal Grandfather      Social History     Tobacco Use    Smoking status: Former     Current packs/day: 0.00     Average packs/day: 0.5 packs/day for 16.6 years (8.3 ttl pk-yrs)     Types: Cigarettes     Start date: 10/1/2004     Quit date: 2021     Years since quittin.7    Smokeless tobacco: Never   Substance Use Topics    Alcohol use: Not Currently    Drug use: Not Currently     Types: Marijuana, MDMA (Ecstacy)     Review of Systems   Constitutional:  Negative for chills and fever.   Respiratory:  Negative for chest tightness and shortness of breath.    Cardiovascular:  Negative for chest pain.   Gastrointestinal:  Positive for nausea and vomiting.       Physical Exam     Initial Vitals   BP Pulse Resp Temp SpO2   24 0714 24 0321 24 0321 24 0321 24 0321   (!) 160/90 110 18 97.9 °F (36.6 °C) 99 %      MAP       --                Physical Exam    Constitutional: He  appears well-developed and well-nourished. He is not diaphoretic. No distress.   Resting comfortably on stretcher.  NAD.   HENT:   Head: Normocephalic and atraumatic.   Right Ear: External ear normal.   Left Ear: External ear normal.   Nose: Nose normal.   Eyes: EOM are normal. Pupils are equal, round, and reactive to light. Right eye exhibits no discharge. Left eye exhibits no discharge.   Cardiovascular:            Mechanical hum over chest.   Pulmonary/Chest: Effort normal and breath sounds normal. No respiratory distress. He has no wheezes. He has no rhonchi. He has no rales. He exhibits no tenderness.   Well-healed midline sternotomy scar   Abdominal: Abdomen is soft. Bowel sounds are normal. He exhibits no distension and no mass. There is no abdominal tenderness.   There is a drive line  exiting right upper quadrant.  Clean and dry.  Dressings intact which are also clean and dry.  No drainage at this time.  No surrounding erythema irritation drainage There is no rebound and no guarding.   Musculoskeletal:         General: No edema. Normal range of motion.     Neurological: He is alert and oriented to person, place, and time. No cranial nerve deficit or sensory deficit. GCS score is 15. GCS eye subscore is 4. GCS verbal subscore is 5. GCS motor subscore is 6.   Skin: Skin is warm and dry. Capillary refill takes less than 2 seconds.           ED Course   Procedures  Labs Reviewed   COMPREHENSIVE METABOLIC PANEL - Abnormal; Notable for the following components:       Result Value    Sodium Level 135 (*)     Potassium Level 3.2 (*)     Glucose Level 234 (*)     Creatinine 1.62 (*)     Albumin Level 3.3 (*)     Globulin 4.2 (*)     Albumin/Globulin Ratio 0.8 (*)     Bilirubin Total 1.7 (*)     Alkaline Phosphatase 172 (*)     Aspartate Aminotransferase 40 (*)     All other components within normal limits   LACTIC ACID, PLASMA - Abnormal; Notable for the following components:    Lactic Acid Level 3.6 (*)     All  other components within normal limits   TROPONIN I - Abnormal; Notable for the following components:    Troponin-I 0.090 (*)     All other components within normal limits   CBC WITH DIFFERENTIAL - Abnormal; Notable for the following components:    RBC 4.06 (*)     Hgb 7.0 (*)     Hct 24.7 (*)     MCV 60.8 (*)     MCH 17.2 (*)     MCHC 28.3 (*)     RDW 23.7 (*)     All other components within normal limits   BLOOD GAS - Abnormal; Notable for the following components:    pO2, Blood gas 74.0 (*)     Sodium, Blood Gas 129 (*)     Potassium, Blood Gas 2.9 (*)     Calcium Level Ionized 1.11 (*)     Base Excess, Blood gas 3.50 (*)     HCO3, Blood gas 28.5 (*)     THb, Blood gas 6.7 (*)     O2 Hb, Blood Gas 93.7 (*)     CO Hgb 3.0 (*)     Met Hgb 0.3 (*)     All other components within normal limits    Narrative:     Room air     POCT GLUCOSE - Abnormal; Notable for the following components:    POCT Glucose 240 (*)     All other components within normal limits   BETA - HYDROXYBUTYRATE, SERUM - Normal   COVID/RSV/FLU A&B PCR - Normal    Narrative:     The Xpert Xpress SARS-CoV-2/FLU/RSV plus is a rapid, multiplexed real-time PCR test intended for the simultaneous qualitative detection and differentiation of SARS-CoV-2, Influenza A, Influenza B, and respiratory syncytial virus (RSV) viral RNA in either nasopharyngeal swab or nasal swab specimens.         MAGNESIUM - Normal   LACTIC ACID, PLASMA - Normal   BLOOD CULTURE OLG   BLOOD CULTURE OLG   CBC W/ AUTO DIFFERENTIAL    Narrative:     The following orders were created for panel order CBC auto differential.  Procedure                               Abnormality         Status                     ---------                               -----------         ------                     CBC with Differential[[id number]]       Abnormal            Final result                 Please view results for these tests on the individual orders.   URINALYSIS, REFLEX TO URINE CULTURE   POCT  GLUCOSE, HAND-HELD DEVICE          Imaging Results              CT Chest Abdomen Pelvis With IV Contrast (XPD) NO Oral Contrast (Final result)  Result time 01/23/24 06:38:13      Final result by Josh Tucker MD (01/23/24 06:38:13)                   Impression:      1. Small left pleural effusion slightly improved compared to prior.  Gas is no longer seen within the effusion.  2. No convincing fluid collection along the LVAD drive line.  3. Trace free fluid abdomen and pelvis.  4. Other chronic findings above.      Electronically signed by: Josh Tucker  Date:    01/23/2024  Time:    06:38               Narrative:    EXAMINATION:  CT CHEST ABDOMEN PELVIS WITH IV CONTRAST (XPD)    CLINICAL HISTORY:  LVAD, drainage from drive line, abscess?;    TECHNIQUE:  Helical acquisition from the thoracic inlet through the ischia with  IV contrast. Three plane reconstructions made available for review.  mGycm. Automatic exposure control, adjustment of mA/kV or iterative reconstruction technique was used to reduce radiation.    COMPARISON:  7 August 2023    FINDINGS:  Chest.    The heart is enlarged.  LVAD remains.  No convincing fluid along the drive line.    No mediastinal fluid collection.  No newly enlarged thoracic lymph nodes.  Prominent gynecomastia.    There is a small left pleural effusion.  No significant air within the effusion today.  Minimal left basilar opacities.  Right lung is clear.    Abdomen and pelvis.    Similar enlarged heterogeneous liver.  Patent portal vein.  Some gallbladder wall edema could relate to fluid overload.  No significant abnormality of the spleen, pancreas or adrenals.  No hydronephrosis.    No bowel obstruction.  Trace free fluid.  No free air.    Urinary bladder empty.  Abdominal aorta normal in caliber.  Mild atherosclerotic disease.    No acute osseous findings.                                       X-Ray Chest AP Portable (Final result)  Result time 01/23/24 06:08:17      Final  result by Ren Murray MD (01/23/24 06:08:17)                   Impression:      No significant change as compared with the previous exam      Electronically signed by: Ren Murray  Date:    01/23/2024  Time:    06:08               Narrative:    EXAMINATION:  XR CHEST AP PORTABLE    CLINICAL HISTORY:  Tachycardia;    TECHNIQUE:  Single frontal view of the chest was performed.    COMPARISON:  January 19, 2024    FINDINGS:  Examination reveals cardiomediastinal silhouette improved parenchymal changes to be essentially unchanged as compared with the previous exam    Ventricular assist device again identified                                       Medications   sodium chloride 0.9% bolus 1,000 mL 1,000 mL (1,000 mLs Intravenous New Bag 1/23/24 0530)   iopamidoL (ISOVUE-370) injection 100 mL (100 mLs Intravenous Given 1/23/24 0601)     Medical Decision Making  Differential diagnosis to include but not limited to wound infection, abscess, electrolyte abnormality, renal dysfunction, hyperglycemia    Patient awake alert well-appearing.  Reports some drainage to his driveline site on his abdomen is however bandage was clean and dry.  No active drainage here in the emergency department.  CT scan does not reveal any abscess or other obvious infection.  No leukocytosis.  Not hypoglycemic today.  Labs are similar to prior if not improved.  No evidence of DKA.  No acidosis, no ketones.  Same for CT scan.  GCS 15 here awake alert and cooperative.  Discussed findings with both patient and mother in room.  Comfortable with discharge home.  Strongly encouraged to follow up with the PCP, keep in touch with the LVAD Center.  Both voiced understanding.Return precautions given.  Questions invited, questions answered to the best my ability.  Patient discharged home condition stable.      Amount and/or Complexity of Data Reviewed  External Data Reviewed: notes.     Details: See ED course  Labs: ordered. Decision-making details  documented in ED Course.  Radiology: ordered and independent interpretation performed. Decision-making details documented in ED Course.    Risk  Prescription drug management.               ED Course as of 01/23/24 0740   Tue Jan 23, 2024   0330 Note from [facility] 01/19/2024 notes that family had concern about possible infection around driveline site. [MM]   0345 Chart review reveals history congestive heart failure diabetes ICD, LVAD, seizure, dilated cardiomyopathy, substance abuse. [MM]   0503 CBC auto differential(!)  Anemia similar to baseline.  No leukocytosis. [MM]   0503 Troponin I(!)  Mildly improved from prior. [MM]   0504 Lactate, Hemant(!!): 3.6 [MM]   0504 Magnesium : 1.70 [MM]   0504 Comprehensive metabolic panel(!)  Creatinine at baseline.  Mild hyperkalemia 234. [MM]   0504 Beta-Hydroxybutyrate: 0.10 [MM]   0504 Influenza A, Molecular: Not Detected [MM]   0504 Influenza B, Molecular: Not Detected [MM]   0504 RSV Ag by Molecular Method: Not Detected [MM]   0504 SARS-CoV2 (COVID-19) Qualitative PCR: Not Detected [MM]   0504 X-Ray Chest AP Portable  No obvious, acute findings. [MM]   0704 There is a note from 12/19/2023 concerning similar.  Concern for drainage at that time. [MM]   0720 Lactate, Hemant: 1.5 [MM]   0729 Attempted to call wife at [phone], no answer [MM]      ED Course User Index  [MM] Osmani Henson MD                           Clinical Impression:  Final diagnoses:  [Z95.811] LVAD (left ventricular assist device) present (Primary)  [R73.9] Hyperglycemia          ED Disposition Condition    Discharge Stable          ED Prescriptions    None       Follow-up Information       Follow up With Specialties Details Why Contact Info    Rosio Armendariz, [facility] Nurse Practitioner Call   [address] [facility] [address] [phone]               Osmani Henson MD  01/23/24 0748

## 2024-01-23 NOTE — DISCHARGE INSTRUCTIONS
Thanks for letting us take care of you today!  It is our goal to give you courteous care and to keep you comfortable and informed, if you have any questions before you leave I will be happy to try and answer them.    Here is some advice after your visit:    Your visit in the emergency department is NOT definitive care - please follow-up with your primary care doctor and/or specialist within 1-2 days. Please return to the emergency department if you develop worsening symptoms including: fever, chills, chest pain, shortness of breath, weakness, numbness, tingling, nausea, vomiting, inability to eat, drink, or take your medication. Please return if you have any worsening in your condition or if you have any other concerns.    If you had radiology exams like an XRAY or CT in the emergency Department the interpreation on them may be preliminary - there may be less time sensitive findings on the reports please obtain these reports within 24 hours from the hospital or by using your out on your mobile phone to access records.  Bring these to your primary care doctor and/or specialist for further review of incidental findings.    Please review any LAB WORK from your visit today with your primary care physician.    Please be sure to follow up with the primary care physician as well as keep your regularly scheduled appointments with your LVAD team.

## 2024-01-23 NOTE — TELEPHONE ENCOUNTER
"Paged by s/o. Reports increased drainage from DLES. Requested photos with next dressing change and for patient to go to lab tomorrow. Then reported that he has been "confused and acting crazy at night". Advised that he should report to local ER. She states he is better right now and she wants to watch him tonight and if it happens again she will call EMS.   "

## 2024-01-24 NOTE — PROGRESS NOTES
1/24/24 Ms Mouton called for lab result, informed her that other labs were drawn at [facility] but no PT/INR, states Patient will go to [facility] today

## 2024-01-25 ENCOUNTER — ANTI-COAG VISIT (OUTPATIENT)
Dept: CARDIOLOGY | Facility: CLINIC | Age: 39
End: 2024-01-25
Payer: MEDICAID

## 2024-01-25 DIAGNOSIS — Z95.811 LVAD (LEFT VENTRICULAR ASSIST DEVICE) PRESENT: Primary | ICD-10-CM

## 2024-01-25 LAB — INR PPP: 2.73

## 2024-01-26 ENCOUNTER — TELEPHONE (OUTPATIENT)
Dept: TRANSPLANT | Facility: CLINIC | Age: 39
End: 2024-01-26
Payer: MEDICAID

## 2024-01-26 NOTE — TELEPHONE ENCOUNTER
----- Message from Jelly Hummel LPN sent at 12/13/2023  1:55 PM CST -----  Regarding: Weekly milrinone labs--link with pt/inr appointment/[facility]  [facility] [place] [facility] (may/may not received results same day) // [facility] (Ph) [phone] (Kq) [phone] // Every monday labs  [facility] ph [phone] f- [phone]    NEW ORDERS FAXED TO [facility] AS PT APPEARS TO BE VISITING THIS PARTICULAR FACILITY TO GET LABS DRAWN   ----- Message -----  From: Jelly Hummel LPN  Sent: 11/17/2022  10:05 AM CST  To: [facility]  Subject: Weekly milrinone labs--link with pt/inr appo#    [facility] [facility] (may/may not received results same day) / [facility] ph [phone]  ----- Message -----  From: Inessa Arana RN  Sent: 9/22/2022   8:42 AM CST  To: [facility]  Subject: Weekly milrinone labs                             [facility] ph [phone]

## 2024-01-28 LAB
BACTERIA BLD CULT: NORMAL
BACTERIA BLD CULT: NORMAL

## 2024-01-30 ENCOUNTER — ANTI-COAG VISIT (OUTPATIENT)
Dept: CARDIOLOGY | Facility: CLINIC | Age: 39
End: 2024-01-30
Payer: MEDICAID

## 2024-01-30 DIAGNOSIS — Z95.811 LVAD (LEFT VENTRICULAR ASSIST DEVICE) PRESENT: Primary | ICD-10-CM

## 2024-01-30 LAB — INR PPP: 4.06

## 2024-01-30 PROCEDURE — 93793 ANTICOAG MGMT PT WARFARIN: CPT | Mod: ,,,

## 2024-01-30 NOTE — PROGRESS NOTES
INR at goal. Medications reviewed and no changes noted to necessitate warfarin adjustment.   See calendar.     .

## 2024-01-30 NOTE — PROGRESS NOTES
1/30/24 [facility] called regarding 1/29 missed lab, said nikky didn't pick them up yesterday, will be going today

## 2024-01-31 ENCOUNTER — CLINICAL SUPPORT (OUTPATIENT)
Dept: INFECTIOUS DISEASES | Facility: CLINIC | Age: 39
End: 2024-01-31
Payer: MEDICAID

## 2024-01-31 DIAGNOSIS — Z79.2 ANTIBIOTIC LONG-TERM USE: ICD-10-CM

## 2024-01-31 DIAGNOSIS — T82.7XXA INFECTION ASSOCIATED WITH DRIVELINE OF LEFT VENTRICULAR ASSIST DEVICE (LVAD): Primary | ICD-10-CM

## 2024-01-31 DIAGNOSIS — T82.7XXA INFECTION INVOLVING IMPLANTABLE CARDIOVERTER-DEFIBRILLATOR (ICD), INITIAL ENCOUNTER: ICD-10-CM

## 2024-01-31 DIAGNOSIS — R78.81 BACTEREMIA DUE TO STAPHYLOCOCCUS: ICD-10-CM

## 2024-01-31 DIAGNOSIS — B95.8 BACTEREMIA DUE TO STAPHYLOCOCCUS: ICD-10-CM

## 2024-01-31 PROCEDURE — 99443 PR PHYSICIAN TELEPHONE EVALUATION 21-30 MIN: CPT | Mod: 95,,, | Performed by: STUDENT IN AN ORGANIZED HEALTH CARE EDUCATION/TRAINING PROGRAM

## 2024-01-31 NOTE — PROGRESS NOTES
Pt's mother states pt did have green beans last night and the night before about one serving size. Pt does have bruising on his arm form 1/23 hospital visit due to not being able to find a vein and he does a lot of bruising from it. Pt's mother confirmed pt taking all correct doses and reports no other changes to be made.

## 2024-01-31 NOTE — PROGRESS NOTES
The patient location is: home  The chief complaint leading to consultation is: f/u    Visit type: audiovisual attempted, pt had issues logging onto [facility], audio only      35 minutes of total time spent on the encounter, which includes face to face time and non-face to face time preparing to see the patient (eg, review of tests), Obtaining and/or reviewing separately obtained history, Documenting clinical information in the electronic or other health record, Independently interpreting results (not separately reported) and communicating results to the patient/family/caregiver, or Care coordination (not separately reported).         Each patient to whom he or she provides medical services by telemedicine is:  (1) informed of the relationship between the physician and patient and the respective role of any other health care provider with respect to management of the patient; and (2) notified that he or she may decline to receive medical services by telemedicine and may withdraw from such care at any time.    Notes:             INFECT[facility]OUS DISEASE CLINIC  01/31/2024     Subjective:      Chief Complaint:   Chief Complaint   Patient presents with    Follow-up       History of Present Illness:    This is a 38 y.o. male with LVAD c/b prior MSSA DLESI with recent admission for COVID, DLESI and MSSA bacteremia c/b L empyema (maintained on ancef, maria isabel 9/14) who is referred to my clinic for follow up.  Patient known to ID, please see prior notes for full details. Doing well since discharge, reports tolerating ancef without issues. PICC line inadvertently was pulled a couple of weeks ago and went to ER to get it replaced with CXR done at that time with interval improvement in L sided opacity.  Denies fevers, chills, drainage from DLES or diarrhea. Reports breathing is better, still with minimal L sided pleuritic chest pain.     Interval history:  9/15/23: Pt states he is doing well. Tolerating ancef without issues, on last  bulb. No fevers, chills, dlesi drainage, reports minimal L pleuritic CP. Denies issues with PICC. Per chart review, pt was recently admitted for seizure like activity. Denies further seizures, HA or vision changes. Late to our appt - spoke with mother who got in touch with pt to get on virtual.   11/29/23: Doing well since discharged - was sent home on 2w course of dapto - completed without issues. Has PICC in for milrinone, denies problems. No fevers or chills. Reports some bloody drainage from prior ICD site. No drainage over DLES.  1/31/24: Had a seizure episode earlier this month, may have pulled DL a little. Denied issues with cefadroxil, tolerating without issues and denies missed doses. Per partner, pt had an episode a couple weeks ago of drainage from DL - went to [facility] ED, drainage cleared up prior to being seen. No fevers or chills. Denies further issues or drainage from DL/prior ICD site.     Review of Systems   Constitutional: Negative for chills and fever.   All other systems reviewed and are negative.        Past Medical History:   Diagnosis Date    Acute respiratory failure with hypoxia 07/22/2023    Arthritis     Awareness alteration, transient 09/01/2022    Cardiomyopathy     CHF (congestive heart failure) 10/01/2020    COVID-19 06/03/2023    Diabetes mellitus     Dilated cardiomyopathy 10/26/2020    Drug abuse 10/2020    Headache 04/19/2023    Hyperglycemia 12/16/2022    Hyperosmolar hyperglycemic state (HHS) 05/25/2022    ICD (implantable cardioverter-defibrillator) in place 10/26/2020    Infected defibrillator now s/p removal Nov 2023 07/23/2023    Left ventricular assist device (LVAD) complication, initial encounter 06/05/2023    Muscle cramping 06/15/2022    Renal disorder     SOB (shortness of breath) 06/13/2022    Tingling in extremities 07/13/2022     Past Surgical History:   Procedure Laterality Date    APPLICATION OF WOUND VACUUM-ASSISTED CLOSURE DEVICE N/A 6/30/2022    Procedure:  APPLICATION, WOUND VAC;  Surgeon: Luis F Paige MD;  Location: [facility] OR [facility] FLR;  Service: Cardiovascular;  Laterality: N/A;  50 x 5 cm    CARDIAC DEFIBRILLATOR PLACEMENT      ECHOCARDIOGRAM,TRANSESOPHAGEAL  11/9/2023    Procedure: Transesophageal echo (GUILLERMO) intra-procedure log documentation;  Surgeon: Eron Ivy MD;  Location: [facility] EP LAB;  Service: Cardiology;;    EXTRACTION, ELECTRODE LEAD Left 11/9/2023    Procedure: EXTRACTION, ELECTRODE LEAD;  Surgeon: ADALID Leon MD;  Location: [facility] EP LAB;  Service: Cardiology;  Laterality: Left;  INFECTION, LEAD EXTRACTION, GUILLERMO, BSCI, ANES, EH,   *NO CTS BACKUP*    IMPLANTATION OF RIGHT VENTRICULAR ASSIST DEVICE (RVAD) N/A 6/29/2022    Procedure: INSERTION, RVAD;  Surgeon: Luis F Paige MD;  Location: [facility] OR [facility] FLR;  Service: Cardiovascular;  Laterality: N/A;    INSERTION OF GRAFT TO PERICARDIUM Right 6/30/2022    Procedure: INSERTION-RIGHT VENTRICULAR ASSIST DEVICE;  Surgeon: Luis F Paige MD;  [facility];  Service: Cardiovascular;  Laterality: Right;    IRRIGATION OF MEDIASTINUM  6/30/2022    Procedure: IRRIGATION, MEDIASTINUM;  Surgeon: Luis F Paige MD;  [facility];  Service: Cardiovascular;;    LEFT VENTRICULAR ASSIST DEVICE Left 6/23/2022    Procedure: INSERTION-LEFT VENTRICULAR ASSIST DEVICE;  Surgeon: Luis F Paige MD;  Location: [facility] OR [facility] FLR;  Service: Cardiovascular;  Laterality: Left;    LEFT VENTRICULAR ASSIST DEVICE N/A 6/29/2022    Procedure: INSERTION-LEFT VENTRICULAR ASSIST DEVICE;  Surgeon: Luis F Paige MD;  Location: [facility] OR [facility] FLR;  Service: Cardiovascular;  Laterality: N/A;    REVISION OF SKIN POCKET FOR CARDIOVERTER-DEFIBRILLATOR  11/9/2023    Procedure: REVISION, SKIN POCKET, FOR CARDIOVERTER-DEFIBRILLATOR;  Surgeon: ADALID Leon MD;  Location: [facility] EP LAB;  Service: Cardiology;;    RIGHT HEART CATHETERIZATION Right 4/8/2022    Procedure: INSERTION, CATHETER, RIGHT HEART;  Surgeon:  Luca Lopez Jr., MD;  Location: [facility] CATH LAB;  Service: Cardiology;  Laterality: Right;    RIGHT HEART CATHETERIZATION Right 2022    Procedure: INSERTION, CATHETER, RIGHT HEART;  Surgeon: Josh Pulido MD;  Location: [facility] CATH LAB;  Service: Cardiology;  Laterality: Right;    RIGHT HEART CATHETERIZATION Right 2022    Procedure: INSERTION, CATHETER, RIGHT HEART;  Surgeon: Dalia Crum MD;  Location: [facility] CATH LAB;  Service: Cardiology;  Laterality: Right;    RIGHT HEART CATHETERIZATION Right 10/31/2022    Procedure: INSERTION, CATHETER, RIGHT HEART;  Surgeon: Dalia Crum MD;  Location: [facility] CATH LAB;  Service: Cardiology;  Laterality: Right;    STERNAL WOUND CLOSURE N/A 2022    Procedure: CLOSURE, WOUND, STERNUM;  Surgeon: Luis F Paige MD;  [facility];  Service: Cardiovascular;  Laterality: N/A;     Family History   Problem Relation Age of Onset    Heart failure Father     Diverticulosis Brother     Heart attack Maternal Grandmother     Heart attack Maternal Grandfather      Social History     Tobacco Use    Smoking status: Former     Current packs/day: 0.00     Average packs/day: 0.5 packs/day for 16.6 years (8.3 ttl pk-yrs)     Types: Cigarettes     Start date: 10/1/2004     Quit date: 2021     Years since quittin.7    Smokeless tobacco: Never   Substance Use Topics    Alcohol use: Not Currently    Drug use: Not Currently     Types: Marijuana, MDMA (Ecstacy)       Review of patient's allergies indicates:   Allergen Reactions    Bumex [bumetanide] Hives    Torsemide Hives         Objective:   VS (24h): There were no vitals filed for this visit.      Physical Exam      Micro:      OR cx - MSSA, MRSE (superficial/deep pocket); cx from lead tip ngtd   blcx ngtd   blcx ngtd  8/3 pleural cx ngtd   blcx MSSA          Immunization History   Administered Date(s) Administered    COVID-19, MRNA, LN-S, PF (Pfizer) (Purple Cap) 2021,  12/29/2021    DTP 1985, 1985, 1985, 06/01/1987    HIB 02/05/1990    Influenza - Quadrivalent - PF *Preferred* (6 months and older) 10/16/2020, 09/24/2021, 12/16/2022    MMR 06/01/1987    OPV 1985, 1985, 06/01/1987         Assessment:     1. 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 Bacteremia due to Staphylococcus    3. Antibiotic long-term use    4. Infection involving implantable cardioverter-defibrillator (ICD), initial encounter             38 y.o. male with LVAD c/b prior MSSA DLESI and MSSA bacteremia c/b L empyema s/p chest tube placement (s/p 6w course of ancef) with recent admission for ICD infection who is referred to my clinic for follow up. Hospital course notable for complete ICD removal on 11/9 (OR cx - deep and superficial pockets with MSSA/staph epi, lead tip cx and blcx ngtd); GUILLERMO without e/o vegetation, s/p 14d of daptomycin. Remains on cefadroxil suppressive therapy - tolerating without issues and denies missed doses.       Plan:     -continue suppressive cefadroxil 500 mg q12hr given prior MSSA bacteremia/multiple DLESI   -pt should have enough until 11/2024, he will let me know if he is running out   -monitor DLES/prior surgical wouind- advised pt and wife to let team know if drainage worsens or if he develops any worsening fevers/chills        Follow up in 2m, virtual visit preferred per pt    Patient was given ample time for questions, all questions answered. Strict return precautions given to patient.       Laura Baldwin MD  Infectious Disease

## 2024-02-02 ENCOUNTER — DOCUMENTATION ONLY (OUTPATIENT)
Dept: TRANSPLANT | Facility: CLINIC | Age: 39
End: 2024-02-02
Payer: MEDICAID

## 2024-02-02 NOTE — PROGRESS NOTES
Mother rescheduled missed labs from 2/1/24 to 2/5/24  Has no transportation to make it to lab today 02/02/2024

## 2024-02-02 NOTE — PROGRESS NOTES
Patient's no showed to lab appointment on 1/29, will attempt to obtain weekly labs when patient goes back to the lab.

## 2024-02-03 LAB
EXT ALBUMIN: 3
EXT ALT: 24
EXT AST: 22
EXT BILIRUBIN TOTAL: 2
EXT BUN: 16
EXT CALCIUM: 9.1
EXT CHLORIDE: 96
EXT CREATININE: 1.53 MG/DL
EXT GLUCOSE: 434
EXT HEMATOCRIT: 26.6
EXT HEMOGLOBIN: 7.7
EXT PLATELETS: 379
EXT POTASSIUM: 3.6
EXT PROTEIN TOTAL: 8.5
EXT SODIUM: 132 MMOL/L
EXT WBC: 6.64

## 2024-02-04 ENCOUNTER — HOSPITAL ENCOUNTER (EMERGENCY)
Facility: HOSPITAL | Age: 39
Discharge: HOME | End: 2024-02-04
Attending: EMERGENCY MEDICINE
Payer: MEDICAID

## 2024-02-04 VITALS
RESPIRATION RATE: 25 BRPM | OXYGEN SATURATION: 98 % | BODY MASS INDEX: 23.62 KG/M2 | HEIGHT: 75 IN | TEMPERATURE: 97 F | WEIGHT: 190 LBS | HEART RATE: 119 BPM

## 2024-02-04 DIAGNOSIS — R11.10 VOMITING, UNSPECIFIED VOMITING TYPE, UNSPECIFIED WHETHER NAUSEA PRESENT: ICD-10-CM

## 2024-02-04 DIAGNOSIS — R73.9 HYPERGLYCEMIA: Primary | ICD-10-CM

## 2024-02-04 LAB
ALBUMIN SERPL-MCNC: 3.2 G/DL (ref 3.5–5)
ALBUMIN/GLOB SERPL: 0.7 RATIO (ref 1.1–2)
ALP SERPL-CCNC: 195 UNIT/L (ref 40–150)
ALT SERPL-CCNC: 23 UNIT/L (ref 0–55)
APTT PPP: 36.4 SECONDS (ref 23.2–33.7)
AST SERPL-CCNC: 31 UNIT/L (ref 5–34)
B-OH-BUTYR SERPL-MCNC: 0.7 MMOL/L
BASE EXCESS BLD CALC-SCNC: -4.2 MMOL/L
BASOPHILS # BLD AUTO: 0.02 X10(3)/MCL
BASOPHILS NFR BLD AUTO: 0.3 %
BILIRUB SERPL-MCNC: 2.3 MG/DL
BLOOD GAS SAMPLE TYPE (OHS): ABNORMAL
BUN SERPL-MCNC: 14.1 MG/DL (ref 8.9–20.6)
CA-I BLD-SCNC: 1.08 MMOL/L (ref 1.12–1.23)
CALCIUM SERPL-MCNC: 8.9 MG/DL (ref 8.4–10.2)
CHLORIDE SERPL-SCNC: 102 MMOL/L (ref 98–107)
CO2 BLDA-SCNC: 22.2 MMOL/L
CO2 SERPL-SCNC: 19 MMOL/L (ref 22–29)
COHGB MFR BLDA: 3.9 %
CREAT SERPL-MCNC: 1.39 MG/DL (ref 0.73–1.18)
DRAWN BY BLOOD GAS (OHS): ABNORMAL
EOSINOPHIL # BLD AUTO: 0 X10(3)/MCL (ref 0–0.9)
EOSINOPHIL NFR BLD AUTO: 0 %
ERYTHROCYTE [DISTWIDTH] IN BLOOD BY AUTOMATED COUNT: 26 % (ref 11.5–17)
FLUAV AG UPPER RESP QL IA.RAPID: NOT DETECTED
FLUBV AG UPPER RESP QL IA.RAPID: NOT DETECTED
GFR SERPLBLD CREATININE-BSD FMLA CKD-EPI: >60 MLS/MIN/1.73/M2
GLOBULIN SER-MCNC: 4.5 GM/DL (ref 2.4–3.5)
GLUCOSE SERPL-MCNC: 373 MG/DL (ref 74–100)
HCO3 BLDA-SCNC: 21 MMOL/L
HCT VFR BLD AUTO: 26.9 % (ref 42–52)
HGB BLD-MCNC: 7.6 G/DL (ref 14–18)
IMM GRANULOCYTES # BLD AUTO: 0.02 X10(3)/MCL (ref 0–0.04)
IMM GRANULOCYTES NFR BLD AUTO: 0.3 %
INR PPP: 2.3
LIPASE SERPL-CCNC: 43 U/L
LYMPHOCYTES # BLD AUTO: 0.94 X10(3)/MCL (ref 0.6–4.6)
LYMPHOCYTES NFR BLD AUTO: 13.7 %
MAGNESIUM SERPL-MCNC: 1.8 MG/DL (ref 1.6–2.6)
MCH RBC QN AUTO: 17.2 PG (ref 27–31)
MCHC RBC AUTO-ENTMCNC: 28.3 G/DL (ref 33–36)
MCV RBC AUTO: 60.7 FL (ref 80–94)
METHGB MFR BLDA: 0.8 %
MONOCYTES # BLD AUTO: 0.38 X10(3)/MCL (ref 0.1–1.3)
MONOCYTES NFR BLD AUTO: 5.5 %
NEUTROPHILS # BLD AUTO: 5.51 X10(3)/MCL (ref 2.1–9.2)
NEUTROPHILS NFR BLD AUTO: 80.2 %
NRBC BLD AUTO-RTO: 0.6 %
O2 HB BLOOD GAS (OHS): 60.9 %
OXYHGB MFR BLDA: 7.9 G/DL
PCO2 BLDA: 38 MMHG (ref 20–50)
PH BLDA: 7.35 [PH] (ref 7.3–7.6)
PLATELET # BLD AUTO: 333 X10(3)/MCL (ref 130–400)
PLATELETS.RETICULATED NFR BLD AUTO: 6.2 % (ref 0.9–11.2)
PMV BLD AUTO: ABNORMAL FL
PO2 BLDA: <38 MMHG
POCT GLUCOSE: 357 MG/DL (ref 70–110)
POTASSIUM BLOOD GAS (OHS): 3.7 MMOL/L (ref 3.5–5)
POTASSIUM SERPL-SCNC: 3.9 MMOL/L (ref 3.5–5.1)
PROT SERPL-MCNC: 7.7 GM/DL (ref 6.4–8.3)
PROTHROMBIN TIME: 25.1 SECONDS (ref 12.5–14.5)
RBC # BLD AUTO: 4.43 X10(6)/MCL (ref 4.7–6.1)
SAO2 % BLDA: 65.4 %
SARS-COV-2 RNA RESP QL NAA+PROBE: NOT DETECTED
SODIUM BLOOD GAS (OHS): 131 MMOL/L (ref 137–145)
SODIUM SERPL-SCNC: 133 MMOL/L (ref 136–145)
TROPONIN I SERPL-MCNC: 0.1 NG/ML (ref 0–0.04)
WBC # SPEC AUTO: 6.87 X10(3)/MCL (ref 4.5–11.5)

## 2024-02-04 PROCEDURE — 0240U COVID/FLU A&B PCR: CPT | Performed by: EMERGENCY MEDICINE

## 2024-02-04 PROCEDURE — 96372 THER/PROPH/DIAG INJ SC/IM: CPT | Performed by: EMERGENCY MEDICINE

## 2024-02-04 PROCEDURE — 83690 ASSAY OF LIPASE: CPT | Performed by: EMERGENCY MEDICINE

## 2024-02-04 PROCEDURE — 85610 PROTHROMBIN TIME: CPT | Performed by: EMERGENCY MEDICINE

## 2024-02-04 PROCEDURE — 99900035 HC TECH TIME PER 15 MIN (STAT)

## 2024-02-04 PROCEDURE — 80053 COMPREHEN METABOLIC PANEL: CPT | Performed by: EMERGENCY MEDICINE

## 2024-02-04 PROCEDURE — 63600175 PHARM REV CODE 636 W HCPCS: Performed by: EMERGENCY MEDICINE

## 2024-02-04 PROCEDURE — 82803 BLOOD GASES ANY COMBINATION: CPT

## 2024-02-04 PROCEDURE — 85730 THROMBOPLASTIN TIME PARTIAL: CPT | Performed by: EMERGENCY MEDICINE

## 2024-02-04 PROCEDURE — 99284 EMERGENCY DEPT VISIT MOD MDM: CPT | Mod: 25

## 2024-02-04 PROCEDURE — 84484 ASSAY OF TROPONIN QUANT: CPT | Performed by: EMERGENCY MEDICINE

## 2024-02-04 PROCEDURE — 85025 COMPLETE CBC W/AUTO DIFF WBC: CPT | Performed by: EMERGENCY MEDICINE

## 2024-02-04 PROCEDURE — 82962 GLUCOSE BLOOD TEST: CPT

## 2024-02-04 PROCEDURE — 83735 ASSAY OF MAGNESIUM: CPT | Performed by: EMERGENCY MEDICINE

## 2024-02-04 PROCEDURE — 82010 KETONE BODYS QUAN: CPT | Performed by: EMERGENCY MEDICINE

## 2024-02-04 RX ORDER — POTASSIUM CHLORIDE 20 MEQ/1
20 TABLET, EXTENDED RELEASE ORAL 3 TIMES DAILY
Status: ON HOLD | COMMUNITY
End: 2024-04-26

## 2024-02-04 RX ORDER — ONDANSETRON 4 MG/1
4 TABLET, ORALLY DISINTEGRATING ORAL EVERY 8 HOURS PRN
Qty: 10 TABLET | Refills: 0 | Status: SHIPPED | OUTPATIENT
Start: 2024-02-04

## 2024-02-04 RX ADMIN — HUMAN INSULIN 6 UNITS: 100 INJECTION, SOLUTION SUBCUTANEOUS at 03:02

## 2024-02-04 RX ADMIN — SODIUM CHLORIDE, POTASSIUM CHLORIDE, SODIUM LACTATE AND CALCIUM CHLORIDE 500 ML: 600; 310; 30; 20 INJECTION, SOLUTION INTRAVENOUS at 04:02

## 2024-02-04 NOTE — ED PROVIDER NOTES
Encounter Date: 2/4/2024    SCRIBE #1 NOTE: I, Luis Javed, am scribing for, and in the presence of,  Prasad Banda MD. I have scribed the following portions of the note - Other sections scribed: HPI, ROS, PE.       History     Chief Complaint   Patient presents with    Emesis     BIBA c/o n/v for several days worsening this PM. Unable to tolerate PO intake since yesterday.     Hyperglycemia      en route per EMS. Pt reports has been having difficulty keeping his BG under control x1 month. Hx DM, and LVAD on continuous Primacor infucsion. Current BG on arrival 352. Pt denies chest pain. Also c/o weakness at this time.     49 year old male with pmhx of CHF, LVAD, drug abuse, DM, and renal disorder presents to ED c/o nausea and vomiting onset 1 day ago. Pt states that he cannot tolerate PO intake. Pt reports additional concern of hyperglycemia, states that he uses insulin, denies pmhx of DKA. Pt reports normal BM. Pt denies chest pain, abdominal pain, dysuria, hematemesis, and sick contacts      The history is provided by the patient. No  was used.     Review of patient's allergies indicates:   Allergen Reactions    Bumex [bumetanide] Hives    Torsemide Hives     Past Medical History:   Diagnosis Date    Acute respiratory failure with hypoxia 07/22/2023    Arthritis     Awareness alteration, transient 09/01/2022    Cardiomyopathy     CHF (congestive heart failure) 10/01/2020    COVID-19 06/03/2023    Diabetes mellitus     Dilated cardiomyopathy 10/26/2020    Drug abuse 10/2020    Headache 04/19/2023    Hyperglycemia 12/16/2022    Hyperosmolar hyperglycemic state (HHS) 05/25/2022    ICD (implantable cardioverter-defibrillator) in place 10/26/2020    Infected defibrillator now s/p removal Nov 20232023 07/23/2023    Left ventricular assist device (LVAD) complication, initial encounter 06/05/2023    Muscle cramping 06/15/2022    Renal disorder     SOB (shortness of breath) 06/13/2022     Tingling in extremities 07/13/2022     Past Surgical History:   Procedure Laterality Date    APPLICATION OF WOUND VACUUM-ASSISTED CLOSURE DEVICE N/A 6/30/2022    Procedure: APPLICATION, WOUND VAC;  Surgeon: Luis F Paige MD;  Location: [facility] OR [facility] FLR;  Service: Cardiovascular;  Laterality: N/A;  50 x 5 cm    CARDIAC DEFIBRILLATOR PLACEMENT      ECHOCARDIOGRAM,TRANSESOPHAGEAL  11/9/2023    Procedure: Transesophageal echo (GUILLERMO) intra-procedure log documentation;  Surgeon: Eron Ivy MD;  Location: [facility] EP LAB;  Service: Cardiology;;    EXTRACTION, ELECTRODE LEAD Left 11/9/2023    Procedure: EXTRACTION, ELECTRODE LEAD;  Surgeon: ADALID Leon MD;  Location: [facility] EP LAB;  Service: Cardiology;  Laterality: Left;  INFECTION, LEAD EXTRACTION, GUILLERMO, BSCI, ANES, EH,   *NO CTS BACKUP*    IMPLANTATION OF RIGHT VENTRICULAR ASSIST DEVICE (RVAD) N/A 6/29/2022    Procedure: INSERTION, RVAD;  Surgeon: Luis F Paige MD;  [facility];  Service: Cardiovascular;  Laterality: N/A;    INSERTION OF GRAFT TO PERICARDIUM Right 6/30/2022    Procedure: INSERTION-RIGHT VENTRICULAR ASSIST DEVICE;  Surgeon: Luis F Paige MD;  [facility];  Service: Cardiovascular;  Laterality: Right;    IRRIGATION OF MEDIASTINUM  6/30/2022    Procedure: IRRIGATION, MEDIASTINUM;  Surgeon: Luis F Paige MD;  [facility];  Service: Cardiovascular;;    LEFT VENTRICULAR ASSIST DEVICE Left 6/23/2022    Procedure: INSERTION-LEFT VENTRICULAR ASSIST DEVICE;  Surgeon: Luis F Paige MD;  [facility];  Service: Cardiovascular;  Laterality: Left;    LEFT VENTRICULAR ASSIST DEVICE N/A 6/29/2022    Procedure: INSERTION-LEFT VENTRICULAR ASSIST DEVICE;  Surgeon: Luis F Paige MD;  Location: [facility] OR [facility] FLR;  Service: Cardiovascular;  Laterality: N/A;    REVISION OF SKIN POCKET FOR CARDIOVERTER-DEFIBRILLATOR  11/9/2023    Procedure: REVISION, SKIN POCKET, FOR CARDIOVERTER-DEFIBRILLATOR;  Surgeon:  ADALID Leon MD;  Location: [facility] EP LAB;  Service: Cardiology;;    RIGHT HEART CATHETERIZATION Right 2022    Procedure: INSERTION, CATHETER, RIGHT HEART;  Surgeon: Luca Lopez Jr., MD;  Location: [facility] CATH LAB;  Service: Cardiology;  Laterality: Right;    RIGHT HEART CATHETERIZATION Right 2022    Procedure: INSERTION, CATHETER, RIGHT HEART;  Surgeon: Josh Pulido MD;  Location: [facility] CATH LAB;  Service: Cardiology;  Laterality: Right;    RIGHT HEART CATHETERIZATION Right 2022    Procedure: INSERTION, CATHETER, RIGHT HEART;  Surgeon: Dalia Crum MD;  Location: [facility] CATH LAB;  Service: Cardiology;  Laterality: Right;    RIGHT HEART CATHETERIZATION Right 10/31/2022    Procedure: INSERTION, CATHETER, RIGHT HEART;  Surgeon: Dalia Crum MD;  Location: [facility] CATH LAB;  Service: Cardiology;  Laterality: Right;    STERNAL WOUND CLOSURE N/A 2022    Procedure: CLOSURE, WOUND, STERNUM;  Surgeon: Luis F Paige MD;  Location: [facility] OR [facility] FLR;  Service: Cardiovascular;  Laterality: N/A;     Family History   Problem Relation Age of Onset    Heart failure Father     Diverticulosis Brother     Heart attack Maternal Grandmother     Heart attack Maternal Grandfather      Social History     Tobacco Use    Smoking status: Former     Current packs/day: 0.00     Average packs/day: 0.5 packs/day for 16.6 years (8.3 ttl pk-yrs)     Types: Cigarettes     Start date: 10/1/2004     Quit date: 2021     Years since quittin.7    Smokeless tobacco: Never   Substance Use Topics    Alcohol use: Not Currently    Drug use: Not Currently     Types: Marijuana, MDMA (Ecstacy)     Review of Systems   Respiratory:  Negative for shortness of breath.    Cardiovascular:  Negative for chest pain.   Gastrointestinal:  Positive for nausea and vomiting. Negative for constipation and diarrhea.   Genitourinary:  Negative for dysuria.       Physical Exam     Initial Vitals [24 0108]   BP Pulse Resp  Temp SpO2   -- (!) 117 18 97.2 °F (36.2 °C) 97 %      MAP       --         Physical Exam    Nursing note and vitals reviewed.  Constitutional: He appears well-developed and well-nourished. No distress.   HENT:   Head: Normocephalic and atraumatic.   Eyes: Conjunctivae and EOM are normal. Pupils are equal, round, and reactive to light. Right eye exhibits no discharge. Left eye exhibits no discharge. No scleral icterus.   Neck: No tracheal deviation present.   Cardiovascular:  Normal rate, regular rhythm and intact distal pulses.           No murmur heard.  Mechanical whirring on auscultation, LVAD wiring from right chest wall   Pulmonary/Chest: Breath sounds normal. No stridor. No respiratory distress. He has no wheezes. He has no rales.   Abdominal: Abdomen is soft. He exhibits no distension. There is no abdominal tenderness.   Musculoskeletal:         General: No tenderness or edema. Normal range of motion.      Comments: Scarring to right shoulder     Neurological: He is alert and oriented to person, place, and time. He has normal strength and normal reflexes. No cranial nerve deficit.   Skin: Skin is warm and dry. No rash noted. No erythema. No pallor.   Psychiatric: He has a normal mood and affect. His behavior is normal. Judgment and thought content normal.         ED Course   Procedures  Labs Reviewed   COMPREHENSIVE METABOLIC PANEL - Abnormal; Notable for the following components:       Result Value    Sodium Level 133 (*)     Carbon Dioxide 19 (*)     Glucose Level 373 (*)     Creatinine 1.39 (*)     Albumin Level 3.2 (*)     Globulin 4.5 (*)     Albumin/Globulin Ratio 0.7 (*)     Bilirubin Total 2.3 (*)     Alkaline Phosphatase 195 (*)     All other components within normal limits   TROPONIN I - Abnormal; Notable for the following components:    Troponin-I 0.097 (*)     All other components within normal limits   PROTIME-INR - Abnormal; Notable for the following components:    PT 25.1 (*)     INR 2.3 (*)      All other components within normal limits   APTT - Abnormal; Notable for the following components:    PTT 36.4 (*)     All other components within normal limits   BETA - HYDROXYBUTYRATE, SERUM - Abnormal; Notable for the following components:    Beta Hydroxybutyrate 0.70 (*)     All other components within normal limits   CBC WITH DIFFERENTIAL - Abnormal; Notable for the following components:    RBC 4.43 (*)     Hgb 7.6 (*)     Hct 26.9 (*)     MCV 60.7 (*)     MCH 17.2 (*)     MCHC 28.3 (*)     RDW 26.0 (*)     All other components within normal limits   BLOOD GAS - Abnormal; Notable for the following components:    Sodium, Blood Gas 131 (*)     Calcium Level Ionized 1.08 (*)     All other components within normal limits   POCT GLUCOSE - Abnormal; Notable for the following components:    POCT Glucose 357 (*)     All other components within normal limits   COVID/FLU A&B PCR - Normal    Narrative:     The Xpert Xpress SARS-CoV-2/FLU/RSV plus is a rapid, multiplexed real-time PCR test intended for the simultaneous qualitative detection and differentiation of SARS-CoV-2, Influenza A, Influenza B, and respiratory syncytial virus (RSV) viral RNA in either nasopharyngeal swab or nasal swab specimens.         LIPASE - Normal   MAGNESIUM - Normal   CBC W/ AUTO DIFFERENTIAL    Narrative:     The following orders were created for panel order CBC auto differential.  Procedure                               Abnormality         Status                     ---------                               -----------         ------                     CBC with Differential[[id number]]       Abnormal            Final result                 Please view results for these tests on the individual orders.   URINALYSIS, REFLEX TO URINE CULTURE          Imaging Results    None          Medications   lactated ringers bolus 500 mL (500 mLs Intravenous New Bag 2/4/24 0415)   insulin regular injection 6 Units 0.06 mL (6 Units Subcutaneous Given  2/4/24 0305)     Medical Decision Making      Differential diagnosis includes but is not limited to DKA, sepsis, hyperglycemia    Amount and/or Complexity of Data Reviewed  Labs: ordered.    Risk  OTC drugs.  Prescription drug management.            Scribe Attestation:   Scribe #1: I performed the above scribed service and the documentation accurately describes the services I performed. I attest to the accuracy of the note.    Attending Attestation:           Physician Attestation for Scribe:  Physician Attestation Statement for Scribe #1: I, Prasad Banda MD, reviewed documentation, as scribed by Luis Javed in my presence, and it is both accurate and complete.             ED Course as of 02/04/24 0450   Sun Feb 04, 2024   0415 Called LVAD coordinator troponin is at baseline BNP is at baseline H and H is at baseline [NL]   0427 Spoke with LVAD coordinator - pt ok to go home if not in dka and vomiting controlled, I went over labs with her. [NL]   0448 Pt is avery po [NL]   0449 Pt not in dka, ph 7.35, ag 12, pt avery po, lvad coordinator ok with dc [NL]      ED Course User Index  [NL] Prasad Banda MD                           Clinical Impression:  Final diagnoses:  [R73.9] Hyperglycemia (Primary)  [R11.10] Vomiting, unspecified vomiting type, unspecified whether nausea present          ED Disposition Condition    Discharge Stable          ED Prescriptions       Medication Sig Dispense Start Date End Date Auth. Provider    ondansetron (ZOFRAN-ODT) 4 MG TbDL Take 1 tablet (4 mg total) by mouth every 8 (eight) hours as needed (nausea). 10 tablet 2/4/2024 -- Prasad Banda MD          Follow-up Information       Follow up With Specialties Details Why Contact Info    Rosio Armendariz, [facility] Nurse Practitioner In 1 day  [address] [place] [id number]  [phone]               Prasad Banda MD  02/04/24 1946

## 2024-02-04 NOTE — ED TRIAGE NOTES
BIBA c/o n/v for several days worsening this PM. Unable to tolerate PO intake since yesterday.  en route per EMS. Pt reports has been having difficulty keeping his BG under control x1 month. Hx DM, and LVAD on continuous Primacor infucsion. Current BG on arrival 352. Pt denies chest pain. Also c/o weakness at this time.

## 2024-02-04 NOTE — ED NOTES
Pt dc home he and mother were instructed to follow up with LVAD coordinator as well as his his pcp I also reinforced the need to keep his BG under controlled pt reports he knows what he needs to do but some days he still eat more than he should. Pt NAD he and mother have no further questions at this time

## 2024-02-04 NOTE — ED NOTES
Pt passed PO challenge MD aware pt reports that he is feel much better, MD spoke with LVAD center in [place] plan to DC with follow up

## 2024-02-04 NOTE — ED NOTES
Pt here via ems for n/v x 3 weeks worse today pt awake alert hx of LVAD and DM CBG was 370 prior to arrival GCS 15 MARTIN has milrinone infusion to PICC line in his right upper arm no issue or complaints with his LVAD

## 2024-02-05 ENCOUNTER — TELEPHONE (OUTPATIENT)
Dept: TRANSPLANT | Facility: CLINIC | Age: 39
End: 2024-02-05
Payer: MEDICAID

## 2024-02-05 ENCOUNTER — ANTI-COAG VISIT (OUTPATIENT)
Dept: CARDIOLOGY | Facility: CLINIC | Age: 39
End: 2024-02-05
Payer: MEDICAID

## 2024-02-05 DIAGNOSIS — Z95.811 LVAD (LEFT VENTRICULAR ASSIST DEVICE) PRESENT: Primary | ICD-10-CM

## 2024-02-05 PROCEDURE — 93793 ANTICOAG MGMT PT WARFARIN: CPT | Mod: ,,,

## 2024-02-05 NOTE — PROGRESS NOTES
[facility]    02/05/2024 9:37 AM    Kevan Queen (38 y.o.) is followed by the [facility] [employer] [facility].      Assessment/Plan:    Kevan Queen presents today with therapeutic INR. Goal INR 2.0-3.0    Assessment of patient findings per MA/LPN and chart review:   The following significant findings were found:  None    Recommendation for patient's warfarin regimen:   No change was made to warfarin therapy during this visit and patient has been instructed to continue their current warfarin regimen.    Recommended repeat INR in 1 week      Nela Morgan, PharmD, BCPS  Clinical Pharmacist - Coumadin Clinic  Preferred Contact: Secure Messaging or In Basket Message

## 2024-02-06 ENCOUNTER — TELEPHONE (OUTPATIENT)
Dept: TRANSPLANT | Facility: CLINIC | Age: 39
End: 2024-02-06
Payer: MEDICAID

## 2024-02-06 NOTE — TELEPHONE ENCOUNTER
00:24- simeon Carlson, paged that she called 911 because patient cannot sleep. He is continuing to vomit and get weaker. Patient is alert and oriented. No VAD alarms. No seizures. EMS is taking him to [facility] ER.    04:17- Page received from ER MD. Labs and CXR are unremarkable. BG is 300s. Patient given an antiemetic and getting a PO challenge. MD asked me why we VAD these people when they are non-compliant and so far away from [facility]. I had a long educational conversation with [facility] ER MD regarding LVAD and why our patients undergo implantation for DT therapy.     Patient will be discharged home to / with us routinely as outpatient.     06:31- simeon Carlson, paged me again stating that she doesn't believe the ER test results and that something is wrong with Kevan. She wants him to be seen in [place]. I advised that she can bring Kevan to [place] ER to be seen if she feels that he is not receiving the proper care at [facility]. Robyn states that she may can get a ride on Wednesday to come to [place]. She will let me know.     I have not been able to speak with Kevan himself to review how he is feeling.

## 2024-02-06 NOTE — TELEPHONE ENCOUNTER
Patient's fiance, Robyn, paged stating that something is wrong with the patient. He is nauseated, vomiting and weak. His BG is also high, she cannot tell me a reading. I advised that if patient is not feeling well, he needs to report to the ER. Robyn states that she will try to get him to go but he is hard headed and doesn't listen to anyone.

## 2024-02-07 ENCOUNTER — DOCUMENTATION ONLY (OUTPATIENT)
Dept: TRANSPLANT | Facility: CLINIC | Age: 39
End: 2024-02-07
Payer: MEDICAID

## 2024-02-07 LAB — EXT BNP: 341.9

## 2024-02-07 NOTE — PROGRESS NOTES
Patient's labs reviewed. Lab work WNL for patient's baseline, no changes made at this time. Plan for VAD team to continue to monitor patient's labs.

## 2024-02-09 LAB
EXT ALBUMIN: 2.8
EXT ALT: 22
EXT AST: 31
EXT BILIRUBIN TOTAL: 2.3
EXT BNP: 462.4
EXT BUN: 12
EXT CALCIUM: 8.2
EXT CHLORIDE: 99
EXT CREATININE: 1.47 MG/DL
EXT GLUCOSE: 423
EXT HEMATOCRIT: 26.8
EXT HEMOGLOBIN: 7.5
EXT MAGNESIUM: 1.8
EXT PLATELETS: 350
EXT POTASSIUM: 4
EXT PROTEIN TOTAL: 7.7
EXT SODIUM: 135 MMOL/L
EXT WBC: 5.36

## 2024-02-12 ENCOUNTER — TELEPHONE (OUTPATIENT)
Dept: TRANSPLANT | Facility: CLINIC | Age: 39
End: 2024-02-12
Payer: MEDICAID

## 2024-02-12 ENCOUNTER — ANTI-COAG VISIT (OUTPATIENT)
Dept: CARDIOLOGY | Facility: CLINIC | Age: 39
End: 2024-02-12
Payer: MEDICAID

## 2024-02-12 DIAGNOSIS — Z95.811 LVAD (LEFT VENTRICULAR ASSIST DEVICE) PRESENT: Primary | ICD-10-CM

## 2024-02-12 LAB — INR PPP: 3.6

## 2024-02-12 PROCEDURE — 93793 ANTICOAG MGMT PT WARFARIN: CPT | Mod: ,,,

## 2024-02-12 NOTE — PROGRESS NOTES
Wife verified correct warfarin dose, reports pain/redness at drive line site--states LVAD was notified, some SOB, no other changes reported

## 2024-02-12 NOTE — PROGRESS NOTES
[facility]    02/12/2024 9:36 AM    Kevan Queen (38 y.o.) is followed by the [facility].      Assessment/Plan:    Kevan Queen presents 2/9/24 with supratherapeutic INR. Goal INR 2.0-3.0    Assessment of patient findings per MA/LPN and chart review:   The following significant findings were found:  Patient reports redness to driveline site but states LVAD team is aware  Per chart review, patient was in the ER over the weekend for overall not feeling well - no significant findings noted    Recommendation for patient's warfarin regimen:   No change was made to warfarin therapy during this visit and patient has been instructed to continue their current warfarin regimen due to result being from 2/9/24.  Will see if patient can come today for lab and if not, tomorrow.    Recommended repeat INR today      Nela Morgan, PharmD, BCPS  Clinical Pharmacist - Coumadin Clinic  Preferred Contact: Secure Messaging or In Basket Message

## 2024-02-13 ENCOUNTER — TELEPHONE (OUTPATIENT)
Dept: TRANSPLANT | Facility: CLINIC | Age: 39
End: 2024-02-13
Payer: MEDICAID

## 2024-02-14 ENCOUNTER — TELEPHONE (OUTPATIENT)
Dept: TRANSPLANT | Facility: CLINIC | Age: 39
End: 2024-02-14
Payer: MEDICAID

## 2024-02-14 NOTE — TELEPHONE ENCOUNTER
Page received from Robyn, patient's fiance. She states that patient is having drainage from driveline site. Patient's abdomen is warm, red and tender to touch. I advised that patient present to his local ER if he cannot be driven to [place]. Robyn states that she will try to talk to the patient to go to his local ER.

## 2024-02-15 ENCOUNTER — TELEPHONE (OUTPATIENT)
Dept: TRANSPLANT | Facility: CLINIC | Age: 39
End: 2024-02-15
Payer: MEDICAID

## 2024-02-15 LAB — EXT BNP: 474.2

## 2024-02-16 ENCOUNTER — TELEPHONE (OUTPATIENT)
Dept: TRANSPLANT | Facility: CLINIC | Age: 39
End: 2024-02-16
Payer: MEDICAID

## 2024-02-16 ENCOUNTER — ANTI-COAG VISIT (OUTPATIENT)
Dept: CARDIOLOGY | Facility: CLINIC | Age: 39
End: 2024-02-16
Payer: MEDICAID

## 2024-02-16 DIAGNOSIS — Z95.811 LVAD (LEFT VENTRICULAR ASSIST DEVICE) PRESENT: Primary | ICD-10-CM

## 2024-02-16 LAB
EXT ALBUMIN: 2.9
EXT ALT: 22
EXT AST: 28
EXT BILIRUBIN TOTAL: 2
EXT BUN: 15
EXT CALCIUM: 8.4
EXT CHLORIDE: 99
EXT CREATININE: 1.42 MG/DL
EXT GLUCOSE: 592
EXT HEMATOCRIT: 27.2
EXT HEMOGLOBIN: 7.4
EXT PLATELETS: 254
EXT POTASSIUM: 4.3
EXT PROTEIN TOTAL: 8
EXT SODIUM: 132 MMOL/L
EXT WBC: 4.98
INR PPP: 3.01

## 2024-02-16 PROCEDURE — 93793 ANTICOAG MGMT PT WARFARIN: CPT | Mod: ,,,

## 2024-02-16 NOTE — TELEPHONE ENCOUNTER
Labs received and reviewed. Glucose 592, called patient s/o and advised to report to local ER. On call VC Robby Feliz notified.

## 2024-02-16 NOTE — PROGRESS NOTES
Spoke with patient wife Darlyn Wright regarding recent INR results .  Patient questioned and verified correct dosage . Reported pain and redness around drive line area - no recent changes .

## 2024-02-16 NOTE — PROGRESS NOTES
[facility]    02/16/2024 1:52 PM    Kevan Queen (38 y.o.) is followed by the [employer] [facility].      Assessment/Plan:    Kevan Queen presents today with supratherapeutic INR. Goal INR 2.0-3.0    Assessment of patient findings per MA/LPN and chart review:   The following significant findings were found:  Patient reports redness and pain to driveline site    Recommendation for patient's warfarin regimen:   Weekly warfarin dose decreased due to repeated supratherapeutic INRs.    Recommended repeat INR in 1 week      Nela Morgan, PharmD, BCPS  Clinical Pharmacist - Coumadin Clinic  Preferred Contact: Secure Messaging or In Basket Message

## 2024-02-17 ENCOUNTER — HOSPITAL ENCOUNTER (EMERGENCY)
Facility: HOSPITAL | Age: 39
Discharge: HOME | End: 2024-02-17
Attending: EMERGENCY MEDICINE
Payer: MEDICAID

## 2024-02-17 ENCOUNTER — TELEPHONE (OUTPATIENT)
Dept: TRANSPLANT | Facility: CLINIC | Age: 39
End: 2024-02-17
Payer: MEDICAID

## 2024-02-17 VITALS
BODY MASS INDEX: 23.62 KG/M2 | DIASTOLIC BLOOD PRESSURE: 88 MMHG | RESPIRATION RATE: 11 BRPM | TEMPERATURE: 98 F | WEIGHT: 190 LBS | OXYGEN SATURATION: 98 % | HEART RATE: 110 BPM | SYSTOLIC BLOOD PRESSURE: 109 MMHG | HEIGHT: 75 IN

## 2024-02-17 DIAGNOSIS — R73.9 HYPERGLYCEMIA: Primary | ICD-10-CM

## 2024-02-17 DIAGNOSIS — E11.65 TYPE 2 DIABETES MELLITUS WITH HYPERGLYCEMIA, UNSPECIFIED WHETHER LONG TERM INSULIN USE: ICD-10-CM

## 2024-02-17 LAB
ALBUMIN SERPL-MCNC: 3 G/DL (ref 3.5–5)
ALBUMIN/GLOB SERPL: 0.6 RATIO (ref 1.1–2)
ALP SERPL-CCNC: 180 UNIT/L (ref 40–150)
ALT SERPL-CCNC: 23 UNIT/L (ref 0–55)
APTT PPP: 39.1 SECONDS (ref 23.2–33.7)
AST SERPL-CCNC: 30 UNIT/L (ref 5–34)
BASOPHILS # BLD AUTO: 0.03 X10(3)/MCL
BASOPHILS NFR BLD AUTO: 0.5 %
BILIRUB SERPL-MCNC: 1.8 MG/DL
BUN SERPL-MCNC: 16.1 MG/DL (ref 8.9–20.6)
CALCIUM SERPL-MCNC: 8.9 MG/DL (ref 8.4–10.2)
CHLORIDE SERPL-SCNC: 95 MMOL/L (ref 98–107)
CO2 SERPL-SCNC: 20 MMOL/L (ref 22–29)
CREAT SERPL-MCNC: 1.7 MG/DL (ref 0.73–1.18)
EOSINOPHIL # BLD AUTO: 0.03 X10(3)/MCL (ref 0–0.9)
EOSINOPHIL NFR BLD AUTO: 0.5 %
ERYTHROCYTE [DISTWIDTH] IN BLOOD BY AUTOMATED COUNT: 25.4 % (ref 11.5–17)
EST. AVERAGE GLUCOSE BLD GHB EST-MCNC: ABNORMAL MG/DL
GFR SERPLBLD CREATININE-BSD FMLA CKD-EPI: 52 MLS/MIN/1.73/M2
GLOBULIN SER-MCNC: 5 GM/DL (ref 2.4–3.5)
GLUCOSE SERPL-MCNC: 809 MG/DL (ref 74–100)
HBA1C MFR BLD: >14 %
HCT VFR BLD AUTO: 27.4 % (ref 42–52)
HGB BLD-MCNC: 7.6 G/DL (ref 14–18)
IMM GRANULOCYTES # BLD AUTO: 0.02 X10(3)/MCL (ref 0–0.04)
IMM GRANULOCYTES NFR BLD AUTO: 0.3 %
INR PPP: 2.6
LYMPHOCYTES # BLD AUTO: 1 X10(3)/MCL (ref 0.6–4.6)
LYMPHOCYTES NFR BLD AUTO: 15.4 %
MAGNESIUM SERPL-MCNC: 2 MG/DL (ref 1.6–2.6)
MCH RBC QN AUTO: 17.2 PG (ref 27–31)
MCHC RBC AUTO-ENTMCNC: 27.7 G/DL (ref 33–36)
MCV RBC AUTO: 62 FL (ref 80–94)
MONOCYTES # BLD AUTO: 0.52 X10(3)/MCL (ref 0.1–1.3)
MONOCYTES NFR BLD AUTO: 8 %
NEUTROPHILS # BLD AUTO: 4.88 X10(3)/MCL (ref 2.1–9.2)
NEUTROPHILS NFR BLD AUTO: 75.3 %
NRBC BLD AUTO-RTO: 0.8 %
PLATELET # BLD AUTO: 186 X10(3)/MCL (ref 130–400)
PLATELETS.RETICULATED NFR BLD AUTO: 4 % (ref 0.9–11.2)
PMV BLD AUTO: ABNORMAL FL
POCT GLUCOSE: 309 MG/DL (ref 70–110)
POCT GLUCOSE: 425 MG/DL (ref 70–110)
POCT GLUCOSE: >500 MG/DL (ref 70–110)
POTASSIUM SERPL-SCNC: 5.2 MMOL/L (ref 3.5–5.1)
PROT SERPL-MCNC: 8 GM/DL (ref 6.4–8.3)
PROTHROMBIN TIME: 27.5 SECONDS (ref 12.5–14.5)
RBC # BLD AUTO: 4.42 X10(6)/MCL (ref 4.7–6.1)
SODIUM SERPL-SCNC: 126 MMOL/L (ref 136–145)
TROPONIN I SERPL-MCNC: 0.11 NG/ML (ref 0–0.04)
WBC # SPEC AUTO: 6.48 X10(3)/MCL (ref 4.5–11.5)

## 2024-02-17 PROCEDURE — 85025 COMPLETE CBC W/AUTO DIFF WBC: CPT | Performed by: EMERGENCY MEDICINE

## 2024-02-17 PROCEDURE — 96376 TX/PRO/DX INJ SAME DRUG ADON: CPT

## 2024-02-17 PROCEDURE — 96361 HYDRATE IV INFUSION ADD-ON: CPT

## 2024-02-17 PROCEDURE — 63600175 PHARM REV CODE 636 W HCPCS: Performed by: EMERGENCY MEDICINE

## 2024-02-17 PROCEDURE — 85610 PROTHROMBIN TIME: CPT | Performed by: EMERGENCY MEDICINE

## 2024-02-17 PROCEDURE — 84484 ASSAY OF TROPONIN QUANT: CPT | Performed by: EMERGENCY MEDICINE

## 2024-02-17 PROCEDURE — 83036 HEMOGLOBIN GLYCOSYLATED A1C: CPT | Performed by: EMERGENCY MEDICINE

## 2024-02-17 PROCEDURE — 96374 THER/PROPH/DIAG INJ IV PUSH: CPT

## 2024-02-17 PROCEDURE — 63600175 PHARM REV CODE 636 W HCPCS

## 2024-02-17 PROCEDURE — 80053 COMPREHEN METABOLIC PANEL: CPT | Performed by: EMERGENCY MEDICINE

## 2024-02-17 PROCEDURE — 85730 THROMBOPLASTIN TIME PARTIAL: CPT | Performed by: EMERGENCY MEDICINE

## 2024-02-17 PROCEDURE — 99284 EMERGENCY DEPT VISIT MOD MDM: CPT | Mod: 25

## 2024-02-17 PROCEDURE — 83735 ASSAY OF MAGNESIUM: CPT | Performed by: EMERGENCY MEDICINE

## 2024-02-17 PROCEDURE — 82962 GLUCOSE BLOOD TEST: CPT

## 2024-02-17 RX ADMIN — INSULIN HUMAN 15 UNITS: 100 INJECTION, SOLUTION PARENTERAL at 10:02

## 2024-02-17 RX ADMIN — SODIUM CHLORIDE, POTASSIUM CHLORIDE, SODIUM LACTATE AND CALCIUM CHLORIDE 1000 ML: 600; 310; 30; 20 INJECTION, SOLUTION INTRAVENOUS at 06:02

## 2024-02-17 RX ADMIN — INSULIN HUMAN 15 UNITS: 100 INJECTION, SOLUTION PARENTERAL at 12:02

## 2024-02-17 RX ADMIN — SODIUM CHLORIDE, POTASSIUM CHLORIDE, SODIUM LACTATE AND CALCIUM CHLORIDE 1000 ML: 600; 310; 30; 20 INJECTION, SOLUTION INTRAVENOUS at 10:02

## 2024-02-17 RX ADMIN — INSULIN HUMAN 10 UNITS: 100 INJECTION, SOLUTION PARENTERAL at 08:02

## 2024-02-17 RX ADMIN — INSULIN HUMAN 10 UNITS: 100 INJECTION, SOLUTION PARENTERAL at 02:02

## 2024-02-17 NOTE — ED PROVIDER NOTES
Encounter Date: 2/17/2024    SCRIBE #1 NOTE: I, Esvin Bates, am scribing for, and in the presence of,  Wilmer Laguna MD. I have scribed the following portions of the note - Other sections scribed: HPI, ROS, PE, MDM.       History     Chief Complaint   Patient presents with    Hyperglycemia     C/o hyperglycemia pt is an LVAD pt and had not been able get his blood sugar under control and has been out of his glucoses strips for the last 4 days. Pt states that he was in touch with his LVAD coordinator in [place] and they have a be at the main campus but need transport. CBG > 500 in triage pt wake alert.      A 37 y/o male with a history of an LVAD, diabetes, and drug abuse presents to [facility] ED with hyperglycemia for the past 4 days. Patient notes that his CBG has been greater than 500 over the past few days. Patient notes that he has been out of his diabetes medications during this time. Patient denies nausea, vomiting, diarrhea, and abdominal pain.       The history is provided by the patient and medical records.     Review of patient's allergies indicates:   Allergen Reactions    Bumex [bumetanide] Hives    Torsemide Hives     Past Medical History:   Diagnosis Date    Acute respiratory failure with hypoxia 07/22/2023    Arthritis     Awareness alteration, transient 09/01/2022    Cardiomyopathy     CHF (congestive heart failure) 10/01/2020    COVID-19 06/03/2023    Diabetes mellitus     Dilated cardiomyopathy 10/26/2020    Drug abuse 10/2020    Headache 04/19/2023    Hyperglycemia 12/16/2022    Hyperosmolar hyperglycemic state (HHS) 05/25/2022    ICD (implantable cardioverter-defibrillator) in place 10/26/2020    Infected defibrillator now s/p removal Nov 20232023 07/23/2023    Left ventricular assist device (LVAD) complication, initial encounter 06/05/2023    Muscle cramping 06/15/2022    Renal disorder     SOB (shortness of breath) 06/13/2022    Tingling in extremities 07/13/2022     Past Surgical  History:   Procedure Laterality Date    APPLICATION OF WOUND VACUUM-ASSISTED CLOSURE DEVICE N/A 6/30/2022    Procedure: APPLICATION, WOUND VAC;  Surgeon: Luis F Paige MD;  Location: [facility] OR [facility] FLR;  Service: Cardiovascular;  Laterality: N/A;  50 x 5 cm    CARDIAC DEFIBRILLATOR PLACEMENT      ECHOCARDIOGRAM,TRANSESOPHAGEAL  11/9/2023    Procedure: Transesophageal echo (GUILLERMO) intra-procedure log documentation;  Surgeon: Eron Ivy MD;  Location: [facility] EP LAB;  Service: Cardiology;;    EXTRACTION, ELECTRODE LEAD Left 11/9/2023    Procedure: EXTRACTION, ELECTRODE LEAD;  Surgeon: ADALID Leon MD;  Location: [facility] EP LAB;  Service: Cardiology;  Laterality: Left;  INFECTION, LEAD EXTRACTION, GUILLERMO, BSCI, ANES, EH,   *NO CTS BACKUP*    IMPLANTATION OF RIGHT VENTRICULAR ASSIST DEVICE (RVAD) N/A 6/29/2022    Procedure: INSERTION, RVAD;  Surgeon: Luis F Paige MD;  [facility];  Service: Cardiovascular;  Laterality: N/A;    INSERTION OF GRAFT TO PERICARDIUM Right 6/30/2022    Procedure: INSERTION-RIGHT VENTRICULAR ASSIST DEVICE;  Surgeon: Luis F aPige MD;  [facility];  Service: Cardiovascular;  Laterality: Right;    IRRIGATION OF MEDIASTINUM  6/30/2022    Procedure: IRRIGATION, MEDIASTINUM;  Surgeon: Luis F Paige MD;  [facility];  Service: Cardiovascular;;    LEFT VENTRICULAR ASSIST DEVICE Left 6/23/2022    Procedure: INSERTION-LEFT VENTRICULAR ASSIST DEVICE;  Surgeon: Luis F Paige MD;  [facility];  Service: Cardiovascular;  Laterality: Left;    LEFT VENTRICULAR ASSIST DEVICE N/A 6/29/2022    Procedure: INSERTION-LEFT VENTRICULAR ASSIST DEVICE;  Surgeon: Luis F Paige MD;  Location: [facility] OR [facility] FLR;  Service: Cardiovascular;  Laterality: N/A;    REVISION OF SKIN POCKET FOR CARDIOVERTER-DEFIBRILLATOR  11/9/2023    Procedure: REVISION, SKIN POCKET, FOR CARDIOVERTER-DEFIBRILLATOR;  Surgeon: ADALID Leon MD;  Location: [facility] EP LAB;   Service: Cardiology;;    RIGHT HEART CATHETERIZATION Right 2022    Procedure: INSERTION, CATHETER, RIGHT HEART;  Surgeon: Luca Lopez Jr., MD;  Location: [facility] CATH LAB;  Service: Cardiology;  Laterality: Right;    RIGHT HEART CATHETERIZATION Right 2022    Procedure: INSERTION, CATHETER, RIGHT HEART;  Surgeon: Josh Pulido MD;  Location: [facility] CATH LAB;  Service: Cardiology;  Laterality: Right;    RIGHT HEART CATHETERIZATION Right 2022    Procedure: INSERTION, CATHETER, RIGHT HEART;  Surgeon: Dalia Crum MD;  Location: [facility] CATH LAB;  Service: Cardiology;  Laterality: Right;    RIGHT HEART CATHETERIZATION Right 10/31/2022    Procedure: INSERTION, CATHETER, RIGHT HEART;  Surgeon: Dalia Crum MD;  Location: [facility] CATH LAB;  Service: Cardiology;  Laterality: Right;    STERNAL WOUND CLOSURE N/A 2022    Procedure: CLOSURE, WOUND, STERNUM;  Surgeon: Luis F Paige MD;  Location: [facility] OR [address] [place] [address];  Service: Cardiovascular;  Laterality: N/A;     Family History   Problem Relation Age of Onset    Heart failure Father     Diverticulosis Brother     Heart attack Maternal Grandmother     Heart attack Maternal Grandfather      Social History     Tobacco Use    Smoking status: Former     Current packs/day: 0.00     Average packs/day: 0.5 packs/day for 16.6 years (8.3 ttl pk-yrs)     Types: Cigarettes     Start date: 10/1/2004     Quit date: 2021     Years since quittin.8    Smokeless tobacco: Never   Substance Use Topics    Alcohol use: Not Currently    Drug use: Not Currently     Types: Marijuana, MDMA (Ecstacy)     Review of Systems   Constitutional:  Negative for chills, fatigue and fever.   HENT:  Negative for congestion and sore throat.    Eyes:  Negative for visual disturbance.   Respiratory:  Negative for cough and shortness of breath.    Cardiovascular:  Negative for chest pain.   Gastrointestinal:  Negative for abdominal pain, diarrhea, nausea and vomiting.    Genitourinary:  Negative for dysuria.   Musculoskeletal:  Negative for myalgias.   Skin:  Negative for rash.   Neurological:  Negative for weakness, numbness and headaches.   All other systems reviewed and are negative.      Physical Exam     Initial Vitals   BP Pulse Resp Temp SpO2   02/17/24 1002 02/17/24 0618 02/17/24 0618 02/17/24 0618 02/17/24 0618   98/77 61 18 98 °F (36.7 °C) 100 %      MAP       --                Physical Exam    Nursing note and vitals reviewed.  Constitutional: He appears well-developed and well-nourished. No distress.   Numerous tattoos.    HENT:   Head: Normocephalic and atraumatic.   Eyes: Conjunctivae and EOM are normal. Pupils are equal, round, and reactive to light. Right eye exhibits no discharge. Left eye exhibits no discharge. No scleral icterus.   Neck: No tracheal deviation present.   Cardiovascular:  Intact distal pulses.           No murmur heard.  LVAD in place with mechanical sounds.    Pulmonary/Chest: Breath sounds normal. No stridor. No respiratory distress. He has no wheezes. He has no rales.   Abdominal: Abdomen is soft. He exhibits no distension. There is no abdominal tenderness. There is no rebound and no guarding.   Musculoskeletal:         General: No tenderness or edema. Normal range of motion.      Comments: PICC line in place to the right upper arm.      Neurological: He is alert and oriented to person, place, and time. He has normal strength and normal reflexes. No cranial nerve deficit.   Skin: Skin is warm and dry. No rash noted. No erythema. No pallor.   Psychiatric: He has a normal mood and affect. His behavior is normal. Judgment and thought content normal.         ED Course   Procedures  Labs Reviewed   COMPREHENSIVE METABOLIC PANEL - Abnormal; Notable for the following components:       Result Value    Sodium Level 126 (*)     Potassium Level 5.2 (*)     Chloride 95 (*)     Carbon Dioxide 20 (*)     Glucose Level 809 (*)     Creatinine 1.70 (*)      Albumin Level 3.0 (*)     Globulin 5.0 (*)     Albumin/Globulin Ratio 0.6 (*)     Bilirubin Total 1.8 (*)     Alkaline Phosphatase 180 (*)     All other components within normal limits   TROPONIN I - Abnormal; Notable for the following components:    Troponin-I 0.106 (*)     All other components within normal limits   HEMOGLOBIN A1C - Abnormal; Notable for the following components:    Hemoglobin A1c >14.0 (*)     All other components within normal limits   CBC WITH DIFFERENTIAL - Abnormal; Notable for the following components:    RBC 4.42 (*)     Hgb 7.6 (*)     Hct 27.4 (*)     MCV 62.0 (*)     MCH 17.2 (*)     MCHC 27.7 (*)     RDW 25.4 (*)     All other components within normal limits   PROTIME-INR - Abnormal; Notable for the following components:    PT 27.5 (*)     INR 2.6 (*)     All other components within normal limits   APTT - Abnormal; Notable for the following components:    PTT 39.1 (*)     All other components within normal limits   POCT GLUCOSE - Abnormal; Notable for the following components:    POCT Glucose >500 (*)     All other components within normal limits   POCT GLUCOSE - Abnormal; Notable for the following components:    POCT Glucose >500 (*)     All other components within normal limits   POCT GLUCOSE - Abnormal; Notable for the following components:    POCT Glucose >500 (*)     All other components within normal limits   POCT GLUCOSE - Abnormal; Notable for the following components:    POCT Glucose >500 (*)     All other components within normal limits   POCT GLUCOSE - Abnormal; Notable for the following components:    POCT Glucose 425 (*)     All other components within normal limits   POCT GLUCOSE - Abnormal; Notable for the following components:    POCT Glucose 309 (*)     All other components within normal limits   MAGNESIUM - Normal   CBC W/ AUTO DIFFERENTIAL    Narrative:     The following orders were created for panel order CBC auto differential.  Procedure                                Abnormality         Status                     ---------                               -----------         ------                     CBC with Differential[[id number]]       Abnormal            Final result                 Please view results for these tests on the individual orders.   URINALYSIS, REFLEX TO URINE CULTURE   POCT GLUCOSE, HAND-HELD DEVICE   POCT GLUCOSE, HAND-HELD DEVICE   POCT GLUCOSE, HAND-HELD DEVICE          Imaging Results              X-Ray Chest AP Portable (Final result)  Result time 02/17/24 09:00:57      Final result by Casey Mott MD (02/17/24 09:00:57)                   Impression:      Cardiomegaly without acute cardiopulmonary abnormality.      Electronically signed by: Casey Mott MD  Date:    02/17/2024  Time:    09:00               Narrative:    EXAMINATION:  Single view chest radiograph.    CLINICAL HISTORY:  hyperglycemia;    TECHNIQUE:  Single view of the chest.    COMPARISON:  Chest radiograph 01/23/2024.    FINDINGS:  The lungs are clear without consolidation or effusion.  There is no pneumothorax.  The cardiac silhouette is enlarged.  The left ventricular assist device is unchanged..  There is no acute osseous abnormality.                        Wet Read by Wilmer Laguna MD (02/17/24 07:15:51, [facility] - Emergency Dept, Emergency Medicine)    Ventricular assist device noted, NAF                                     Medications   insulin regular injection 10 Units 0.1 mL (has no administration in time range)   lactated ringers bolus 1,000 mL (0 mLs Intravenous Stopped 2/17/24 0755)   insulin regular injection 10 Units 0.1 mL (10 Units Intravenous Given 2/17/24 0827)   insulin regular injection 15 Units 0.15 mL (15 Units Intravenous Given 2/17/24 1000)   lactated ringers bolus 1,000 mL (0 mLs Intravenous Stopped 2/17/24 1144)   insulin regular injection 15 Units 0.15 mL (15 Units Intravenous Given 2/17/24 1246)     Medical Decision Making  Differential  diagnosis includes but is not limited to noncompliance and infectious process.     Amount and/or Complexity of Data Reviewed  Labs: ordered.  Radiology: ordered and independent interpretation performed.    Risk  OTC drugs.            Scribe Attestation:   Scribe #1: I performed the above scribed service and the documentation accurately describes the services I performed. I attest to the accuracy of the note.    Attending Attestation:           Physician Attestation for Scribe:  Physician Attestation Statement for Scribe #1: I, Wilmer Laguna MD, reviewed documentation, as scribed by Esvin Bates in my presence, and it is both accurate and complete.             ED Course as of 02/17/24 1357   Sat Feb 17, 2024   7739 I spoke with Robby (LVAD coordinator at [facility]) - states no indication to transfer unless glucose is unable to be controlled. [CL]   1356 Glucose trending in the right direction.  Additional insulin dose ordered.  Will increase Lantus from 18 to 30 units QHS.  Reiterated Humalog sliding scale.  Encouraged F/U with PCP. [CL]      ED Course User Index  [CL] Wilmer Laguna MD                           Clinical Impression:  Final diagnoses:  [R73.9] Hyperglycemia (Primary)          ED Disposition Condition    Discharge Stable          ED Prescriptions       Medication Sig Dispense Start Date End Date Auth. Provider    insulin detemir U-100, Levemir, 100 unit/mL (3 mL) SubQ InPn pen Inject 30 Units into the skin once daily. 6 mL 2/17/2024 6/16/2024 Wilmer Laguna MD          Follow-up Information       Follow up With Specialties Details Why Contact Info    Rosio Armendariz, ROSSP Nurse Practitioner Schedule an appointment as soon as possible for a visit   [address] [place] [address] [id number] [phone]               Wilmer Laguna MD  02/17/24 3581

## 2024-02-17 NOTE — TELEPHONE ENCOUNTER
"0500 Received page from patient's significant other reporting that they are going to local ER as instructed to be DEIRDRE Arana for high blood sugar.     0600: Patient's significant other paged back asking "do y'all have everything ready for him down there" Asked her what she meant and she said a room for admission. Explained that if the ER doctor wants to admit the patient they would need to call the transfer center and initiate the transfer to main [facility] because he cannot be admitted there but it is up to the desertion of the ER MD to determine if situation can be handled in the ED or require admission.     0800: Spoke with ER MD who stated that they were going to attempt to control his blood sugar but wanted to verify plan with us. I stated taht if that patient's blood sugar is able to become controlled and does not require an admission to the hospital, discharging the patient after is ok with our team and patient should be instructed to follow up with his PCP or endocrinologist for his diabetes. If the Blood sugar is unable to be controlled, requiring an inpatient admission or patient is in DKA, then the patient would require admission. MD verbalized understanding and agreement, MD Crum also notified.   "

## 2024-02-20 ENCOUNTER — TELEPHONE (OUTPATIENT)
Dept: TRANSPLANT | Facility: CLINIC | Age: 39
End: 2024-02-20
Payer: MEDICAID

## 2024-02-20 NOTE — TELEPHONE ENCOUNTER
External labs entered for Review  Labs completed on 2/15/24  Results routed to VAD Coordinator    BNP results.

## 2024-02-22 ENCOUNTER — TELEPHONE (OUTPATIENT)
Dept: TRANSPLANT | Facility: CLINIC | Age: 39
End: 2024-02-22
Payer: MEDICAID

## 2024-02-22 LAB
EXT BNP: 471.8
INR PPP: 3.13

## 2024-02-22 NOTE — TELEPHONE ENCOUNTER
----- Message from Luly Servin sent at 2/21/2024  1:54 PM CST -----  Regarding: call back  Contact: [phone]  Pt wife calling in requesting call back please call to discuss Further

## 2024-02-22 NOTE — TELEPHONE ENCOUNTER
"Returned call . Dharmeshgladys requested a fax number to the office to fax disability forms for patient's employers. Fax number provided .       She also mentioned that patient visited [facility]. He was discharged once his blood glucose was decreased to 300s. Patient was discharged on Saturday. She states the patient stills has elevated blood glucose levels at every reading and at times it does not display a number but simply the word "HIGH". She went on to mention patient has elevated blood glucose levels over the last few months . She stated he has been taking his insulin the way it was prescribed.     I inquired about patient's diet . In which she stated he hasn't made any diet changes. She was educated that increase in carb consumption will increase patient's blood glucose. She was informed that the patient may need to see Endocrinologist to educate on diet and make medication changes.  She was informed that this information will be relayed to VAD coordinators for further instructions.    "

## 2024-02-23 ENCOUNTER — DOCUMENTATION ONLY (OUTPATIENT)
Dept: TRANSPLANT | Facility: CLINIC | Age: 39
End: 2024-02-23
Payer: MEDICAID

## 2024-02-23 ENCOUNTER — ANTI-COAG VISIT (OUTPATIENT)
Dept: CARDIOLOGY | Facility: CLINIC | Age: 39
End: 2024-02-23
Payer: MEDICAID

## 2024-02-23 DIAGNOSIS — Z95.811 LVAD (LEFT VENTRICULAR ASSIST DEVICE) PRESENT: Primary | ICD-10-CM

## 2024-02-23 LAB
EXT ALBUMIN: 2.8
EXT ALT: 20
EXT AST: 20
EXT BILIRUBIN TOTAL: 2.1
EXT BUN: 14
EXT CALCIUM: 8.5
EXT CHLORIDE: 100
EXT CREATININE: 1.34 MG/DL
EXT GLUCOSE: 477
EXT HEMATOCRIT: 26.6
EXT HEMOGLOBIN: 7.2
EXT PLATELETS: 239
EXT POTASSIUM: 4.1
EXT PROTEIN TOTAL: 7.7
EXT SODIUM: 133 MMOL/L
EXT WBC: 5.58

## 2024-02-23 PROCEDURE — 93793 ANTICOAG MGMT PT WARFARIN: CPT | Mod: ,,,

## 2024-02-23 NOTE — PROGRESS NOTES
Patient's labs reviewed. Lab work WNL for patient's baseline except for Glucose. Spoke w/ Robyn, significant other, they are trying to get a PCP appointment. No changes made at this time. Plan for VAD team to continue to monitor patient's labs.

## 2024-02-23 NOTE — PROGRESS NOTES
Spoke with patient regarding recent INR results .  Patient questioned and verified correct dosage . Reported still having pain at drive line and   2/17 ER visit due to hyperglycemia -currently waiting to hear back from vad team .  NO other recent changes .

## 2024-02-26 ENCOUNTER — TELEPHONE (OUTPATIENT)
Dept: TRANSPLANT | Facility: CLINIC | Age: 39
End: 2024-02-26
Payer: MEDICAID

## 2024-02-29 ENCOUNTER — TELEPHONE (OUTPATIENT)
Dept: TRANSPLANT | Facility: CLINIC | Age: 39
End: 2024-02-29
Payer: MEDICAID

## 2024-02-29 NOTE — TELEPHONE ENCOUNTER
Attempting to obtain weekly lab results, no lab visit as of this time. Attempted to contact pt s/o, unable to leave VM

## 2024-03-05 ENCOUNTER — ANTI-COAG VISIT (OUTPATIENT)
Dept: CARDIOLOGY | Facility: CLINIC | Age: 39
End: 2024-03-05
Payer: MEDICAID

## 2024-03-05 DIAGNOSIS — Z95.811 LVAD (LEFT VENTRICULAR ASSIST DEVICE) PRESENT: Primary | ICD-10-CM

## 2024-03-05 LAB — INR PPP: 2.77

## 2024-03-05 PROCEDURE — 93793 ANTICOAG MGMT PT WARFARIN: CPT | Mod: ,,,

## 2024-03-05 NOTE — PROGRESS NOTES
Pradeep Page verified warfarin: 4.5 mg daily since last lab, verified tab size: 3mg, has a bit of a cold/sinus/stuffy nose--taking no med taken for it

## 2024-03-05 NOTE — PROGRESS NOTES
Ms Wright was advised of warfarin instructions and next lab date, she repeated instructions back correctly, verbalized understanding

## 2024-03-06 ENCOUNTER — TELEPHONE (OUTPATIENT)
Dept: TRANSPLANT | Facility: CLINIC | Age: 39
End: 2024-03-06
Payer: MEDICAID

## 2024-03-06 NOTE — TELEPHONE ENCOUNTER
Page received from caregiver/simeon Robyn. She states that she is calling 911 for patient because he has chills, aches, fever and a cough. She also states that his abdomen is tender and hot to touch. She reports no drainage from the site but thinks that it may drain at times because the gauze is sticking to the exit site.     I spoke with Robyn in depth about this due to the fact that she has paged me in the past about the same complaints and patient was never taken to the ER. She states that at times, the patient does not want to go to the ER because he is taken to [facility] and then discharged home as if nothing is wrong. I asked if the patient can be brought to [facility]. She states that she will talk with EMS. I asked Robyn to have EMS page or call me when they arrive so that we can discuss.

## 2024-03-07 ENCOUNTER — TELEPHONE (OUTPATIENT)
Dept: TRANSPLANT | Facility: CLINIC | Age: 39
End: 2024-03-07
Payer: MEDICAID

## 2024-03-07 LAB — BNP: 475.2

## 2024-03-07 NOTE — TELEPHONE ENCOUNTER
BNP result faxed from 3/4/24.  .  Pt caregiver was instructed to take pt to ER yesterday. No changes made at this time

## 2024-03-18 ENCOUNTER — TELEPHONE (OUTPATIENT)
Dept: TRANSPLANT | Facility: CLINIC | Age: 39
End: 2024-03-18
Payer: MEDICAID

## 2024-03-18 NOTE — TELEPHONE ENCOUNTER
Notified by lab that Blood glucose is 800.  Called pt and significant other to advise them to go to local ER.  Spoke with Robyn.  Advised her to have someone bring to his local ER now.  Robyn verbalized understanding and agreement. On call Inessa PATRICIA and MD, Dr. Villatoro notified

## 2024-03-19 ENCOUNTER — ANTI-COAG VISIT (OUTPATIENT)
Dept: CARDIOLOGY | Facility: CLINIC | Age: 39
End: 2024-03-19
Payer: MEDICAID

## 2024-03-19 ENCOUNTER — TELEPHONE (OUTPATIENT)
Dept: TRANSPLANT | Facility: CLINIC | Age: 39
End: 2024-03-19
Payer: MEDICAID

## 2024-03-19 ENCOUNTER — TELEPHONE (OUTPATIENT)
Dept: CARDIOTHORACIC SURGERY | Facility: CLINIC | Age: 39
End: 2024-03-19
Payer: MEDICAID

## 2024-03-19 DIAGNOSIS — Z95.811 LVAD (LEFT VENTRICULAR ASSIST DEVICE) PRESENT: Primary | ICD-10-CM

## 2024-03-19 LAB
EXT ALBUMIN: 3.1
EXT ALT: 29
EXT AST: 20
EXT BILIRUBIN TOTAL: 1.7
EXT BUN: 11
EXT CALCIUM: 9.1
EXT CHLORIDE: 90
EXT CREATININE: 1.48 MG/DL
EXT GLUCOSE: 808
EXT HEMATOCRIT: 28.9
EXT HEMOGLOBIN: 7.8
EXT PLATELETS: 220
EXT POTASSIUM: 4.3
EXT PROTEIN TOTAL: 8.3
EXT SODIUM: 122 MMOL/L
EXT WBC: 5.27
INR PPP: 2.79

## 2024-03-19 PROCEDURE — 93793 ANTICOAG MGMT PT WARFARIN: CPT | Mod: ,,,

## 2024-03-19 NOTE — TELEPHONE ENCOUNTER
----- Message from Mehran Messer sent at 3/19/2024  1:10 PM CDT -----  Regarding: Pt Advice  Contact: [phone]  Missed Callback         Pt returning callback from missed call. Requesting to speak with somebody in  ([place])  office. Please call [phone]      
[Time Spent: ___ minutes] : I have spent [unfilled] minutes of time on the encounter.

## 2024-03-19 NOTE — TELEPHONE ENCOUNTER
Robyn called looking to talk with someone about the virtual visit tomorrow at 11 am.  I see that is with ID so transferred her call to ID.  Before transferring, I asked about the ER visit last night. Robyn said they went to [facility], BG got down to 400 so they were D/C home.

## 2024-03-21 ENCOUNTER — TELEPHONE (OUTPATIENT)
Dept: INFECTIOUS DISEASES | Facility: CLINIC | Age: 39
End: 2024-03-21
Payer: MEDICAID

## 2024-03-21 NOTE — TELEPHONE ENCOUNTER
Patient had a virtual appointment with Dr Baldwin today at 11am.   He logged in at 11:23am, past his 15 minute gege period.   Spoke to patient, stated he was late to his virtual due to the pre-check in questions.   Rescheduled his appointment to 4/26, advice patient to complete his pre-check in a day prior his appointment. Her verbalized understating.

## 2024-03-27 ENCOUNTER — TELEPHONE (OUTPATIENT)
Dept: TRANSPLANT | Facility: CLINIC | Age: 39
End: 2024-03-27
Payer: MEDICAID

## 2024-03-29 ENCOUNTER — PATIENT MESSAGE (OUTPATIENT)
Dept: ADMINISTRATIVE | Facility: OTHER | Age: 39
End: 2024-03-29
Payer: MEDICAID

## 2024-04-02 ENCOUNTER — TELEPHONE (OUTPATIENT)
Dept: TRANSPLANT | Facility: CLINIC | Age: 39
End: 2024-04-02
Payer: MEDICAID

## 2024-04-02 LAB — INR PPP: 4.19

## 2024-04-02 NOTE — TELEPHONE ENCOUNTER
Spoke with caregiver regarding equipment maintenance due at upcoming clinic appointment on 4/10/2024.  Asked caregiver to bring MPU and [employer] with them to next appointment for maintenance.  Verbalized understanding and agreement.    It is medically necessary to ensure patient has properly functioning equipment and wearables to prevent infection, injury or death to patient.

## 2024-04-02 NOTE — TELEPHONE ENCOUNTER
Lucien returned kamilla call stating concern for milrinone and inaccurate missing weights. Explained patient has a long standing non-compliance history. Home Health Lucien stated that they explained the significance of weights and the medication to the patient's caregiver. Acknowledged concerns, routed information to Dr. Crum.

## 2024-04-02 NOTE — TELEPHONE ENCOUNTER
----- Message from Justin Cleaning sent at 4/1/2024  2:43 PM CDT -----  Regarding: Noncomplaint  Lucien, [facility]       Pt has been noncompliant with his home health nurse and not getting his weight taking. Please call     Contact@ [phone] ext [phone]    Thanks

## 2024-04-03 ENCOUNTER — ANTI-COAG VISIT (OUTPATIENT)
Dept: CARDIOLOGY | Facility: CLINIC | Age: 39
End: 2024-04-03
Payer: MEDICAID

## 2024-04-03 DIAGNOSIS — Z95.811 LVAD (LEFT VENTRICULAR ASSIST DEVICE) PRESENT: Primary | ICD-10-CM

## 2024-04-03 PROCEDURE — 93793 ANTICOAG MGMT PT WARFARIN: CPT | Mod: ,,,

## 2024-04-03 NOTE — PROGRESS NOTES
Pt's wife reports pt had a fever of 102 last week. She also says he has been having pain at Driveline site and there was bleeding when she changed his bandage last week. She said she encouraged pt to go to the ED for evaluation and to call his VAD team but pt refused. He has not had any bleeding or fever this week but having increased night sweats. She confirmed correct warfarin dose and denies any other changes. Message sent to VAD team.

## 2024-04-05 ENCOUNTER — TELEPHONE (OUTPATIENT)
Dept: TRANSPLANT | Facility: CLINIC | Age: 39
End: 2024-04-05
Payer: MEDICAID

## 2024-04-05 NOTE — TELEPHONE ENCOUNTER
Called patient's lab, patient went ot lab on 4/2 but only a PT/Inr was drawn. Will attempt to obtain labs when patient returns.

## 2024-04-09 NOTE — PROGRESS NOTES
Called pt's wife to r/s missed lab. She states that pt is going to VAD clinic tomorrow and will get INR. She said she believes he will be admitted due to him still having a fever and pain at driveline site. Pt refused ED.

## 2024-04-17 ENCOUNTER — ANTI-COAG VISIT (OUTPATIENT)
Dept: CARDIOLOGY | Facility: CLINIC | Age: 39
End: 2024-04-17
Payer: MEDICAID

## 2024-04-17 DIAGNOSIS — Z95.811 LVAD (LEFT VENTRICULAR ASSIST DEVICE) PRESENT: Primary | ICD-10-CM

## 2024-04-17 LAB — INR PPP: 3.32

## 2024-04-17 PROCEDURE — 93793 ANTICOAG MGMT PT WARFARIN: CPT | Mod: ,,,

## 2024-04-17 NOTE — PROGRESS NOTES
Pt's wife reports pt continues to have pain at DL site but denies any further bleeding. She reports he had a fever this week and last week. He vomited on Monday and it appeared normal. She confirmed correct warfarin and denies any other changes.       Update 1307hrs: Pt's wife states that pt will be going to [facility] [place] for [place] due to fever, vomiting and pain.INR rescheduled to 4/19 early AM per her request. I advised her on warfarin dosing.

## 2024-04-18 ENCOUNTER — TELEPHONE (OUTPATIENT)
Dept: TRANSPLANT | Facility: CLINIC | Age: 39
End: 2024-04-18
Payer: MEDICAID

## 2024-04-18 NOTE — TELEPHONE ENCOUNTER
----- Message from Jelly Hummel LPN sent at 12/13/2023  1:55 PM CST -----  Regarding: Weekly milrinone labs--link with pt/inr appointment/tara  [facility] [place] [facility] (may/may not received results same day) // [facility] (Ph) [phone] (Rj) [phone] // Every monday labs  [facility] ph [phone] f- [phone]    NEW ORDERS FAXED TO [facility] AS PT APPEARS TO BE VISITING THIS PARTICULAR FACILITY TO GET LABS DRAWN   ----- Message -----  From: Jelly Hummel LPN  Sent: 11/17/2022  10:05 AM CST  To: [facility]  Subject: Weekly milrinone labs--link with pt/inr appo#    [facility] [place] [facility] (may/may not received results same day) / [facility] ph [phone]  ----- Message -----  From: Inessa Arana RN  Sent: 9/22/2022   8:42 AM CST  To: [facility]  Subject: Weekly milrinone labs                             [facility] ph [phone]

## 2024-04-18 NOTE — TELEPHONE ENCOUNTER
Called lab to see if patient went to get weekly lab work done, lab reported that patient went on 4/16 but told them only to draw the PT/INR so weekly lab work was not completed. Patient should be returning to lab tomorrow 4/19. Called patient in attempts to get labs drawn tomorrow. No answer voicemail left with call back number.

## 2024-04-20 ENCOUNTER — HOSPITAL ENCOUNTER (INPATIENT)
Facility: HOSPITAL | Age: 39
LOS: 7 days | Discharge: HOME | DRG: 291 | End: 2024-04-27
Attending: EMERGENCY MEDICINE | Admitting: INTERNAL MEDICINE
Payer: MEDICAID

## 2024-04-20 DIAGNOSIS — I50.9 ACUTE DECOMPENSATED HEART FAILURE: Primary | ICD-10-CM

## 2024-04-20 DIAGNOSIS — Z95.811 LVAD (LEFT VENTRICULAR ASSIST DEVICE) PRESENT: ICD-10-CM

## 2024-04-20 DIAGNOSIS — E11.00 TYPE 2 DIABETES MELLITUS WITH HYPEROSMOLAR HYPERGLYCEMIC STATE (HHS): ICD-10-CM

## 2024-04-20 DIAGNOSIS — Z71.89 ADVANCE CARE PLANNING: ICD-10-CM

## 2024-04-20 DIAGNOSIS — R25.2 MUSCLE CRAMPING: ICD-10-CM

## 2024-04-20 DIAGNOSIS — E11.65 TYPE 2 DIABETES MELLITUS WITH HYPERGLYCEMIA, WITH LONG-TERM CURRENT USE OF INSULIN: ICD-10-CM

## 2024-04-20 DIAGNOSIS — Z71.89 GOALS OF CARE, COUNSELING/DISCUSSION: ICD-10-CM

## 2024-04-20 DIAGNOSIS — E11.65 TYPE 2 DIABETES MELLITUS WITH HYPERGLYCEMIA, UNSPECIFIED WHETHER LONG TERM INSULIN USE: ICD-10-CM

## 2024-04-20 DIAGNOSIS — Z79.4 TYPE 2 DIABETES MELLITUS WITH HYPERGLYCEMIA, WITH LONG-TERM CURRENT USE OF INSULIN: ICD-10-CM

## 2024-04-20 DIAGNOSIS — I50.43 ACUTE ON CHRONIC COMBINED SYSTOLIC AND DIASTOLIC HEART FAILURE: ICD-10-CM

## 2024-04-20 DIAGNOSIS — Z51.5 PALLIATIVE CARE ENCOUNTER: ICD-10-CM

## 2024-04-20 LAB
ALBUMIN SERPL BCP-MCNC: 2.9 G/DL (ref 3.5–5.2)
ALBUMIN SERPL BCP-MCNC: 3.1 G/DL (ref 3.5–5.2)
ALLENS TEST: ABNORMAL
ALLENS TEST: NORMAL
ALP SERPL-CCNC: 176 U/L (ref 55–135)
ALP SERPL-CCNC: 183 U/L (ref 55–135)
ALT SERPL W/O P-5'-P-CCNC: 15 U/L (ref 10–44)
ALT SERPL W/O P-5'-P-CCNC: 18 U/L (ref 10–44)
ANION GAP SERPL CALC-SCNC: 11 MMOL/L (ref 8–16)
ANION GAP SERPL CALC-SCNC: 11 MMOL/L (ref 8–16)
ANION GAP SERPL CALC-SCNC: 13 MMOL/L (ref 8–16)
APTT PPP: 32.5 SEC (ref 21–32)
AST SERPL-CCNC: 32 U/L (ref 10–40)
AST SERPL-CCNC: 35 U/L (ref 10–40)
B-OH-BUTYR BLD STRIP-SCNC: 1.2 MMOL/L (ref 0–0.5)
BACTERIA #/AREA URNS AUTO: NORMAL /HPF
BASOPHILS # BLD AUTO: 0.04 K/UL (ref 0–0.2)
BASOPHILS NFR BLD: 0.6 % (ref 0–1.9)
BILIRUB SERPL-MCNC: 2.4 MG/DL (ref 0.1–1)
BILIRUB SERPL-MCNC: 3 MG/DL (ref 0.1–1)
BILIRUB UR QL STRIP: NEGATIVE
BUN SERPL-MCNC: 10 MG/DL (ref 6–30)
BUN SERPL-MCNC: 11 MG/DL (ref 6–20)
BUN SERPL-MCNC: 11 MG/DL (ref 6–20)
BUN SERPL-MCNC: 12 MG/DL (ref 6–20)
CALCIUM SERPL-MCNC: 8.8 MG/DL (ref 8.7–10.5)
CALCIUM SERPL-MCNC: 9 MG/DL (ref 8.7–10.5)
CALCIUM SERPL-MCNC: 9 MG/DL (ref 8.7–10.5)
CHLORIDE SERPL-SCNC: 92 MMOL/L (ref 95–110)
CHLORIDE SERPL-SCNC: 95 MMOL/L (ref 95–110)
CLARITY UR REFRACT.AUTO: CLEAR
CO2 SERPL-SCNC: 18 MMOL/L (ref 23–29)
CO2 SERPL-SCNC: 20 MMOL/L (ref 23–29)
CO2 SERPL-SCNC: 25 MMOL/L (ref 23–29)
COLOR UR AUTO: YELLOW
CREAT SERPL-MCNC: 1 MG/DL (ref 0.5–1.4)
CREAT SERPL-MCNC: 1 MG/DL (ref 0.5–1.4)
CREAT SERPL-MCNC: 1.3 MG/DL (ref 0.5–1.4)
CREAT SERPL-MCNC: 1.4 MG/DL (ref 0.5–1.4)
DIFFERENTIAL METHOD BLD: ABNORMAL
EOSINOPHIL # BLD AUTO: 0 K/UL (ref 0–0.5)
EOSINOPHIL NFR BLD: 0.6 % (ref 0–8)
ERYTHROCYTE [DISTWIDTH] IN BLOOD BY AUTOMATED COUNT: 25.2 % (ref 11.5–14.5)
EST. GFR  (NO RACE VARIABLE): >60 ML/MIN/1.73 M^2
GLUCOSE SERPL-MCNC: 138 MG/DL (ref 70–110)
GLUCOSE SERPL-MCNC: 592 MG/DL (ref 70–110)
GLUCOSE SERPL-MCNC: 602 MG/DL (ref 70–110)
GLUCOSE SERPL-MCNC: 680 MG/DL (ref 70–110)
GLUCOSE SERPL-MCNC: >500 MG/DL (ref 70–110)
GLUCOSE UR QL STRIP: ABNORMAL
HCO3 UR-SCNC: 19.8 MMOL/L (ref 24–28)
HCT VFR BLD AUTO: 29.5 % (ref 40–54)
HCT VFR BLD CALC: 36 %PCV (ref 36–54)
HGB BLD-MCNC: 8.3 G/DL (ref 14–18)
HGB UR QL STRIP: ABNORMAL
HYALINE CASTS UR QL AUTO: 0 /LPF
IMM GRANULOCYTES # BLD AUTO: 0.02 K/UL (ref 0–0.04)
IMM GRANULOCYTES NFR BLD AUTO: 0.3 % (ref 0–0.5)
INR PPP: 1.9 (ref 0.8–1.2)
KETONES UR QL STRIP: ABNORMAL
LDH SERPL L TO P-CCNC: 1.96 MMOL/L (ref 0.5–2.2)
LDH SERPL L TO P-CCNC: 305 U/L (ref 110–260)
LEUKOCYTE ESTERASE UR QL STRIP: NEGATIVE
LYMPHOCYTES # BLD AUTO: 1.4 K/UL (ref 1–4.8)
LYMPHOCYTES NFR BLD: 22.8 % (ref 18–48)
MAGNESIUM SERPL-MCNC: 1.9 MG/DL (ref 1.6–2.6)
MCH RBC QN AUTO: 17 PG (ref 27–31)
MCHC RBC AUTO-ENTMCNC: 28.1 G/DL (ref 32–36)
MCV RBC AUTO: 61 FL (ref 82–98)
MICROSCOPIC COMMENT: NORMAL
MONOCYTES # BLD AUTO: 0.6 K/UL (ref 0.3–1)
MONOCYTES NFR BLD: 10.3 % (ref 4–15)
NEUTROPHILS # BLD AUTO: 4.1 K/UL (ref 1.8–7.7)
NEUTROPHILS NFR BLD: 65.4 % (ref 38–73)
NITRITE UR QL STRIP: NEGATIVE
NRBC BLD-RTO: 2 /100 WBC
OHS QRS DURATION: 124 MS
OHS QTC CALCULATION: 552 MS
OSMOLALITY SERPL: 301 MOSM/KG (ref 280–300)
PCO2 BLDA: 33.7 MMHG (ref 35–45)
PH SMN: 7.38 [PH] (ref 7.35–7.45)
PH UR STRIP: 6 [PH] (ref 5–8)
PHOSPHATE SERPL-MCNC: 1.8 MG/DL (ref 2.7–4.5)
PHOSPHATE SERPL-MCNC: 2.2 MG/DL (ref 2.7–4.5)
PHOSPHATE SERPL-MCNC: 2.3 MG/DL (ref 2.7–4.5)
PLATELET # BLD AUTO: 294 K/UL (ref 150–450)
PMV BLD AUTO: ABNORMAL FL (ref 9.2–12.9)
PO2 BLDA: 49 MMHG (ref 40–60)
POC BE: -5 MMOL/L
POC IONIZED CALCIUM: 1.18 MMOL/L (ref 1.06–1.42)
POC SATURATED O2: 84 % (ref 95–100)
POC TCO2 (MEASURED): 19 MMOL/L (ref 23–29)
POC TCO2: 21 MMOL/L (ref 24–29)
POCT GLUCOSE: 152 MG/DL (ref 70–110)
POCT GLUCOSE: 157 MG/DL (ref 70–110)
POCT GLUCOSE: 173 MG/DL (ref 70–110)
POCT GLUCOSE: 174 MG/DL (ref 70–110)
POCT GLUCOSE: 198 MG/DL (ref 70–110)
POCT GLUCOSE: 229 MG/DL (ref 70–110)
POCT GLUCOSE: 344 MG/DL (ref 70–110)
POCT GLUCOSE: 490 MG/DL (ref 70–110)
POCT GLUCOSE: >500 MG/DL (ref 70–110)
POTASSIUM BLD-SCNC: 4.3 MMOL/L (ref 3.5–5.1)
POTASSIUM SERPL-SCNC: 3.1 MMOL/L (ref 3.5–5.1)
POTASSIUM SERPL-SCNC: 3.9 MMOL/L (ref 3.5–5.1)
POTASSIUM SERPL-SCNC: 4.3 MMOL/L (ref 3.5–5.1)
PROCALCITONIN SERPL IA-MCNC: 0.44 NG/ML
PROT SERPL-MCNC: 7.6 G/DL (ref 6–8.4)
PROT SERPL-MCNC: 8 G/DL (ref 6–8.4)
PROT UR QL STRIP: ABNORMAL
PROTHROMBIN TIME: 20.3 SEC (ref 9–12.5)
RBC # BLD AUTO: 4.87 M/UL (ref 4.6–6.2)
RBC #/AREA URNS AUTO: 1 /HPF (ref 0–4)
SAMPLE: ABNORMAL
SAMPLE: ABNORMAL
SAMPLE: NORMAL
SITE: ABNORMAL
SITE: NORMAL
SODIUM BLD-SCNC: 127 MMOL/L (ref 136–145)
SODIUM SERPL-SCNC: 123 MMOL/L (ref 136–145)
SODIUM SERPL-SCNC: 126 MMOL/L (ref 136–145)
SODIUM SERPL-SCNC: 131 MMOL/L (ref 136–145)
SP GR UR STRIP: >1.03 (ref 1–1.03)
URN SPEC COLLECT METH UR: ABNORMAL
WBC # BLD AUTO: 6.23 K/UL (ref 3.9–12.7)
WBC #/AREA URNS AUTO: 1 /HPF (ref 0–5)
YEAST UR QL AUTO: NORMAL

## 2024-04-20 PROCEDURE — A4216 STERILE WATER/SALINE, 10 ML: HCPCS | Performed by: INTERNAL MEDICINE

## 2024-04-20 PROCEDURE — 81001 URINALYSIS AUTO W/SCOPE: CPT | Performed by: EMERGENCY MEDICINE

## 2024-04-20 PROCEDURE — 99900035 HC TECH TIME PER 15 MIN (STAT)

## 2024-04-20 PROCEDURE — 80053 COMPREHEN METABOLIC PANEL: CPT | Mod: 91 | Performed by: PHYSICIAN ASSISTANT

## 2024-04-20 PROCEDURE — 25000003 PHARM REV CODE 250: Performed by: INTERNAL MEDICINE

## 2024-04-20 PROCEDURE — 25500020 PHARM REV CODE 255: Performed by: EMERGENCY MEDICINE

## 2024-04-20 PROCEDURE — 82010 KETONE BODYS QUAN: CPT | Performed by: EMERGENCY MEDICINE

## 2024-04-20 PROCEDURE — 99285 EMERGENCY DEPT VISIT HI MDM: CPT | Mod: 25

## 2024-04-20 PROCEDURE — 96365 THER/PROPH/DIAG IV INF INIT: CPT

## 2024-04-20 PROCEDURE — 27000248 HC VAD-ADDITIONAL DAY

## 2024-04-20 PROCEDURE — 82800 BLOOD PH: CPT

## 2024-04-20 PROCEDURE — 99223 1ST HOSP IP/OBS HIGH 75: CPT | Mod: ,,, | Performed by: NURSE PRACTITIONER

## 2024-04-20 PROCEDURE — 80053 COMPREHEN METABOLIC PANEL: CPT | Performed by: EMERGENCY MEDICINE

## 2024-04-20 PROCEDURE — 80048 BASIC METABOLIC PNL TOTAL CA: CPT | Mod: XB | Performed by: INTERNAL MEDICINE

## 2024-04-20 PROCEDURE — 84295 ASSAY OF SERUM SODIUM: CPT

## 2024-04-20 PROCEDURE — 25000003 PHARM REV CODE 250: Performed by: EMERGENCY MEDICINE

## 2024-04-20 PROCEDURE — 63600175 PHARM REV CODE 636 W HCPCS: Performed by: PHYSICIAN ASSISTANT

## 2024-04-20 PROCEDURE — 20600001 HC STEP DOWN PRIVATE ROOM

## 2024-04-20 PROCEDURE — 83930 ASSAY OF BLOOD OSMOLALITY: CPT | Performed by: EMERGENCY MEDICINE

## 2024-04-20 PROCEDURE — 80048 BASIC METABOLIC PNL TOTAL CA: CPT | Mod: XB

## 2024-04-20 PROCEDURE — 84100 ASSAY OF PHOSPHORUS: CPT | Mod: 91 | Performed by: PHYSICIAN ASSISTANT

## 2024-04-20 PROCEDURE — 84100 ASSAY OF PHOSPHORUS: CPT | Mod: 91 | Performed by: EMERGENCY MEDICINE

## 2024-04-20 PROCEDURE — 63600175 PHARM REV CODE 636 W HCPCS: Performed by: INTERNAL MEDICINE

## 2024-04-20 PROCEDURE — 84145 PROCALCITONIN (PCT): CPT | Performed by: EMERGENCY MEDICINE

## 2024-04-20 PROCEDURE — 25000003 PHARM REV CODE 250: Performed by: PHYSICIAN ASSISTANT

## 2024-04-20 PROCEDURE — 93750 INTERROGATION VAD IN PERSON: CPT | Mod: ,,, | Performed by: INTERNAL MEDICINE

## 2024-04-20 PROCEDURE — 93010 ELECTROCARDIOGRAM REPORT: CPT | Mod: ,,, | Performed by: INTERNAL MEDICINE

## 2024-04-20 PROCEDURE — 82962 GLUCOSE BLOOD TEST: CPT

## 2024-04-20 PROCEDURE — 63600175 PHARM REV CODE 636 W HCPCS: Performed by: NURSE PRACTITIONER

## 2024-04-20 PROCEDURE — 36415 COLL VENOUS BLD VENIPUNCTURE: CPT | Performed by: INTERNAL MEDICINE

## 2024-04-20 PROCEDURE — 93005 ELECTROCARDIOGRAM TRACING: CPT

## 2024-04-20 PROCEDURE — 85610 PROTHROMBIN TIME: CPT | Performed by: EMERGENCY MEDICINE

## 2024-04-20 PROCEDURE — 85730 THROMBOPLASTIN TIME PARTIAL: CPT | Performed by: EMERGENCY MEDICINE

## 2024-04-20 PROCEDURE — 82330 ASSAY OF CALCIUM: CPT

## 2024-04-20 PROCEDURE — 83615 LACTATE (LD) (LDH) ENZYME: CPT | Performed by: EMERGENCY MEDICINE

## 2024-04-20 PROCEDURE — 87040 BLOOD CULTURE FOR BACTERIA: CPT | Mod: 59 | Performed by: EMERGENCY MEDICINE

## 2024-04-20 PROCEDURE — 83605 ASSAY OF LACTIC ACID: CPT

## 2024-04-20 PROCEDURE — 25000003 PHARM REV CODE 250: Performed by: NURSE PRACTITIONER

## 2024-04-20 PROCEDURE — 85025 COMPLETE CBC W/AUTO DIFF WBC: CPT | Performed by: EMERGENCY MEDICINE

## 2024-04-20 PROCEDURE — 85014 HEMATOCRIT: CPT

## 2024-04-20 PROCEDURE — 83735 ASSAY OF MAGNESIUM: CPT | Performed by: EMERGENCY MEDICINE

## 2024-04-20 PROCEDURE — 84132 ASSAY OF SERUM POTASSIUM: CPT

## 2024-04-20 PROCEDURE — 02HV33Z INSERTION OF INFUSION DEVICE INTO SUPERIOR VENA CAVA, PERCUTANEOUS APPROACH: ICD-10-PCS | Performed by: INTERNAL MEDICINE

## 2024-04-20 PROCEDURE — 99223 1ST HOSP IP/OBS HIGH 75: CPT | Mod: ,,, | Performed by: INTERNAL MEDICINE

## 2024-04-20 PROCEDURE — 82803 BLOOD GASES ANY COMBINATION: CPT

## 2024-04-20 PROCEDURE — 63600175 PHARM REV CODE 636 W HCPCS: Performed by: EMERGENCY MEDICINE

## 2024-04-20 RX ORDER — SPIRONOLACTONE 25 MG/1
25 TABLET ORAL DAILY
Status: DISCONTINUED | OUTPATIENT
Start: 2024-04-20 | End: 2024-04-27 | Stop reason: HOSPADM

## 2024-04-20 RX ORDER — IBUPROFEN 200 MG
24 TABLET ORAL
Status: DISCONTINUED | OUTPATIENT
Start: 2024-04-20 | End: 2024-04-23

## 2024-04-20 RX ORDER — LANOLIN ALCOHOL/MO/W.PET/CERES
400 CREAM (GRAM) TOPICAL DAILY
Status: DISCONTINUED | OUTPATIENT
Start: 2024-04-20 | End: 2024-04-27 | Stop reason: HOSPADM

## 2024-04-20 RX ORDER — AMLODIPINE BESYLATE 5 MG/1
5 TABLET ORAL DAILY
Status: DISCONTINUED | OUTPATIENT
Start: 2024-04-20 | End: 2024-04-27 | Stop reason: HOSPADM

## 2024-04-20 RX ORDER — SODIUM CHLORIDE 9 MG/ML
1000 INJECTION, SOLUTION INTRAVENOUS CONTINUOUS
Status: ACTIVE | OUTPATIENT
Start: 2024-04-20 | End: 2024-04-20

## 2024-04-20 RX ORDER — POTASSIUM CHLORIDE 20 MEQ/1
40 TABLET, EXTENDED RELEASE ORAL DAILY
Status: DISCONTINUED | OUTPATIENT
Start: 2024-04-20 | End: 2024-04-21

## 2024-04-20 RX ORDER — FUROSEMIDE 40 MG/1
40 TABLET ORAL DAILY
Status: DISCONTINUED | OUTPATIENT
Start: 2024-04-21 | End: 2024-04-22

## 2024-04-20 RX ORDER — IBUPROFEN 200 MG
16 TABLET ORAL
Status: DISCONTINUED | OUTPATIENT
Start: 2024-04-20 | End: 2024-04-23

## 2024-04-20 RX ORDER — DEXTROSE MONOHYDRATE AND SODIUM CHLORIDE 5; .45 G/100ML; G/100ML
INJECTION, SOLUTION INTRAVENOUS CONTINUOUS PRN
Status: DISCONTINUED | OUTPATIENT
Start: 2024-04-20 | End: 2024-04-23

## 2024-04-20 RX ORDER — MAGNESIUM SULFATE HEPTAHYDRATE 40 MG/ML
2 INJECTION, SOLUTION INTRAVENOUS ONCE
Status: COMPLETED | OUTPATIENT
Start: 2024-04-20 | End: 2024-04-20

## 2024-04-20 RX ORDER — INSULIN ASPART 100 [IU]/ML
0-10 INJECTION, SOLUTION INTRAVENOUS; SUBCUTANEOUS
Status: DISCONTINUED | OUTPATIENT
Start: 2024-04-20 | End: 2024-04-23

## 2024-04-20 RX ORDER — MIRTAZAPINE 15 MG/1
15 TABLET, FILM COATED ORAL NIGHTLY
Status: DISCONTINUED | OUTPATIENT
Start: 2024-04-20 | End: 2024-04-27 | Stop reason: HOSPADM

## 2024-04-20 RX ORDER — ACETAMINOPHEN 325 MG/1
650 TABLET ORAL EVERY 6 HOURS PRN
Status: DISCONTINUED | OUTPATIENT
Start: 2024-04-20 | End: 2024-04-27 | Stop reason: HOSPADM

## 2024-04-20 RX ORDER — FUROSEMIDE 10 MG/ML
80 INJECTION INTRAMUSCULAR; INTRAVENOUS
Status: DISCONTINUED | OUTPATIENT
Start: 2024-04-20 | End: 2024-04-20

## 2024-04-20 RX ORDER — SODIUM CHLORIDE 0.9 % (FLUSH) 0.9 %
10 SYRINGE (ML) INJECTION
Status: DISCONTINUED | OUTPATIENT
Start: 2024-04-20 | End: 2024-04-27 | Stop reason: HOSPADM

## 2024-04-20 RX ORDER — MUPIROCIN 20 MG/G
OINTMENT TOPICAL 2 TIMES DAILY
Status: DISPENSED | OUTPATIENT
Start: 2024-04-20 | End: 2024-04-25

## 2024-04-20 RX ORDER — ATORVASTATIN CALCIUM 20 MG/1
40 TABLET, FILM COATED ORAL DAILY
Status: DISCONTINUED | OUTPATIENT
Start: 2024-04-20 | End: 2024-04-27 | Stop reason: HOSPADM

## 2024-04-20 RX ORDER — CYCLOBENZAPRINE HCL 5 MG
5 TABLET ORAL 3 TIMES DAILY PRN
Status: DISCONTINUED | OUTPATIENT
Start: 2024-04-20 | End: 2024-04-24

## 2024-04-20 RX ORDER — WARFARIN SODIUM 5 MG/1
5 TABLET ORAL DAILY
Status: DISCONTINUED | OUTPATIENT
Start: 2024-04-20 | End: 2024-04-22

## 2024-04-20 RX ORDER — GABAPENTIN 300 MG/1
300 CAPSULE ORAL DAILY
Status: DISCONTINUED | OUTPATIENT
Start: 2024-04-20 | End: 2024-04-27 | Stop reason: HOSPADM

## 2024-04-20 RX ORDER — ASPIRIN 81 MG/1
81 TABLET ORAL DAILY
Status: DISCONTINUED | OUTPATIENT
Start: 2024-04-20 | End: 2024-04-27 | Stop reason: HOSPADM

## 2024-04-20 RX ORDER — GLUCAGON 1 MG
1 KIT INJECTION
Status: DISCONTINUED | OUTPATIENT
Start: 2024-04-20 | End: 2024-04-23

## 2024-04-20 RX ORDER — METHOCARBAMOL 500 MG/1
1000 TABLET, FILM COATED ORAL ONCE
Status: COMPLETED | OUTPATIENT
Start: 2024-04-20 | End: 2024-04-20

## 2024-04-20 RX ORDER — SODIUM CHLORIDE 0.9 % (FLUSH) 0.9 %
10 SYRINGE (ML) INJECTION
Status: DISCONTINUED | OUTPATIENT
Start: 2024-04-20 | End: 2024-04-23

## 2024-04-20 RX ORDER — SODIUM CHLORIDE 0.9 % (FLUSH) 0.9 %
10 SYRINGE (ML) INJECTION EVERY 6 HOURS
Status: DISCONTINUED | OUTPATIENT
Start: 2024-04-20 | End: 2024-04-27 | Stop reason: HOSPADM

## 2024-04-20 RX ORDER — FUROSEMIDE 10 MG/ML
80 INJECTION INTRAMUSCULAR; INTRAVENOUS ONCE
Status: COMPLETED | OUTPATIENT
Start: 2024-04-20 | End: 2024-04-20

## 2024-04-20 RX ORDER — PANTOPRAZOLE SODIUM 40 MG/1
40 TABLET, DELAYED RELEASE ORAL DAILY
Status: DISCONTINUED | OUTPATIENT
Start: 2024-04-20 | End: 2024-04-27 | Stop reason: HOSPADM

## 2024-04-20 RX ORDER — ONDANSETRON 4 MG/1
4 TABLET, ORALLY DISINTEGRATING ORAL EVERY 8 HOURS PRN
Status: DISCONTINUED | OUTPATIENT
Start: 2024-04-20 | End: 2024-04-27 | Stop reason: HOSPADM

## 2024-04-20 RX ORDER — LEVETIRACETAM 500 MG/1
1500 TABLET ORAL 2 TIMES DAILY
Status: DISCONTINUED | OUTPATIENT
Start: 2024-04-20 | End: 2024-04-27 | Stop reason: HOSPADM

## 2024-04-20 RX ORDER — MILRINONE LACTATE 0.2 MG/ML
0.25 INJECTION, SOLUTION INTRAVENOUS CONTINUOUS
Status: DISCONTINUED | OUTPATIENT
Start: 2024-04-20 | End: 2024-04-27 | Stop reason: HOSPADM

## 2024-04-20 RX ORDER — SODIUM CHLORIDE 9 MG/ML
1000 INJECTION, SOLUTION INTRAVENOUS CONTINUOUS
Status: DISCONTINUED | OUTPATIENT
Start: 2024-04-20 | End: 2024-04-20

## 2024-04-20 RX ADMIN — CYCLOBENZAPRINE HYDROCHLORIDE 5 MG: 5 TABLET, FILM COATED ORAL at 05:04

## 2024-04-20 RX ADMIN — AMLODIPINE BESYLATE 5 MG: 5 TABLET ORAL at 04:04

## 2024-04-20 RX ADMIN — Medication 400 MG: at 09:04

## 2024-04-20 RX ADMIN — MILRINONE LACTATE IN DEXTROSE 0.25 MCG/KG/MIN: 200 INJECTION, SOLUTION INTRAVENOUS at 05:04

## 2024-04-20 RX ADMIN — IOHEXOL 75 ML: 350 INJECTION, SOLUTION INTRAVENOUS at 03:04

## 2024-04-20 RX ADMIN — LEVETIRACETAM 1500 MG: 500 TABLET, FILM COATED ORAL at 09:04

## 2024-04-20 RX ADMIN — ATORVASTATIN CALCIUM 40 MG: 20 TABLET, FILM COATED ORAL at 09:04

## 2024-04-20 RX ADMIN — GABAPENTIN 300 MG: 300 CAPSULE ORAL at 06:04

## 2024-04-20 RX ADMIN — INSULIN HUMAN 5.5 UNITS/HR: 1 INJECTION, SOLUTION INTRAVENOUS at 02:04

## 2024-04-20 RX ADMIN — METHOCARBAMOL 1000 MG: 500 TABLET ORAL at 09:04

## 2024-04-20 RX ADMIN — MILRINONE LACTATE IN DEXTROSE 0.25 MCG/KG/MIN: 200 INJECTION, SOLUTION INTRAVENOUS at 06:04

## 2024-04-20 RX ADMIN — INSULIN ASPART 2 UNITS: 100 INJECTION, SOLUTION INTRAVENOUS; SUBCUTANEOUS at 11:04

## 2024-04-20 RX ADMIN — POTASSIUM CHLORIDE 40 MEQ: 1500 TABLET, EXTENDED RELEASE ORAL at 09:04

## 2024-04-20 RX ADMIN — ASPIRIN 81 MG: 81 TABLET, COATED ORAL at 09:04

## 2024-04-20 RX ADMIN — SODIUM CHLORIDE 1000 ML: 9 INJECTION, SOLUTION INTRAVENOUS at 05:04

## 2024-04-20 RX ADMIN — CYCLOBENZAPRINE HYDROCHLORIDE 5 MG: 5 TABLET, FILM COATED ORAL at 11:04

## 2024-04-20 RX ADMIN — FUROSEMIDE 80 MG: 10 INJECTION, SOLUTION INTRAVENOUS at 04:04

## 2024-04-20 RX ADMIN — SPIRONOLACTONE 25 MG: 25 TABLET ORAL at 09:04

## 2024-04-20 RX ADMIN — MUPIROCIN: 20 OINTMENT TOPICAL at 09:04

## 2024-04-20 RX ADMIN — VANCOMYCIN HYDROCHLORIDE 1750 MG: 500 INJECTION, POWDER, LYOPHILIZED, FOR SOLUTION INTRAVENOUS at 04:04

## 2024-04-20 RX ADMIN — PANTOPRAZOLE SODIUM 40 MG: 40 TABLET, DELAYED RELEASE ORAL at 09:04

## 2024-04-20 RX ADMIN — MAGNESIUM SULFATE HEPTAHYDRATE 2 G: 40 INJECTION, SOLUTION INTRAVENOUS at 12:04

## 2024-04-20 RX ADMIN — WARFARIN SODIUM 5 MG: 5 TABLET ORAL at 04:04

## 2024-04-20 RX ADMIN — SODIUM CHLORIDE 1000 ML: 9 INJECTION, SOLUTION INTRAVENOUS at 04:04

## 2024-04-20 RX ADMIN — Medication 10 ML: at 05:04

## 2024-04-20 RX ADMIN — INSULIN HUMAN 2 UNITS/HR: 1 INJECTION, SOLUTION INTRAVENOUS at 04:04

## 2024-04-20 RX ADMIN — FUROSEMIDE 10 MG/HR: 10 INJECTION, SOLUTION INTRAMUSCULAR; INTRAVENOUS at 05:04

## 2024-04-20 NOTE — ASSESSMENT & PLAN NOTE
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

## 2024-04-20 NOTE — HPI
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 He underwent RVAD removal and chest closure 6/30/2022.  He also completed a course of IV Abx for staph epi. He was weaned off  but he had to restarted due to RVF. He was eventually transitioned to milrinone (secondary to  shortage) now on 0.25 mcg/kg/min. He has a history of unclear syncope vs seizures and is followed by neuro for this and is on Keppra.       On 11/15/23 underwent removal of eroded [employer] scICD--no Lifevest (due to LVAD) and no plan to replace ICD as not requiring therapy post LVAD with concern for reinfection.  He has not had neurology f/u with seizure hx on keppra so coordinator to assist him with obtaining appt.  Patient presents for concerns for fever, vomiting and pain and concerns of driveline infection, low INR and hyperglycemia. Beta hydroxybutyrate negative. Lactic acid negative. Normal WBC count and CT Abd/Pelv with contrast without signs of driveline infection. Had elevated blood glucose 03/19/2024 and was advised to go to ED. Also advised to go to ED in the first week of April for fever of 102*F (reported) but did not go at that time.

## 2024-04-20 NOTE — ASSESSMENT & PLAN NOTE
Blood glucose 592 mg/dL on ISTAT, anion gap 13  Hx of A1c 14%  Most recent A1c 04/02/2024 7.9%  Insulin while inpatient

## 2024-04-20 NOTE — PLAN OF CARE
Recommendations     ADAT to Low Na, Diabetic w/ fluid restriction per MD.  RD to monitor & follow-up.     Goals: Meet % EEN, EPN by RD f/u date  Nutrition Goal Status: new  Communication of RD Recs: reviewed with RN

## 2024-04-20 NOTE — CONSULTS
D/L PICC placed in left brachial vein, 41 cm in length with 0 cm exposed. Arm circumference 32 cm. Lot#[id number]

## 2024-04-20 NOTE — ED TRIAGE NOTES
Kevan Queen, an 39 y.o. male presents to the ED with complaints of drive line drainage and pain for weeks. +hyperglycemia.      Chief Complaint   Patient presents with    LVAD problem      line leaking for weeks. Pain at driverline site. No alarms.     Hyperglycemia     >500     Review of patient's allergies indicates:   Allergen Reactions    Bumex [bumetanide] Hives    Torsemide Hives     Past Medical History:   Diagnosis Date    Acute respiratory failure with hypoxia 07/22/2023    Arthritis     Awareness alteration, transient 09/01/2022    Cardiomyopathy     CHF (congestive heart failure) 10/01/2020    COVID-19 06/03/2023    Diabetes mellitus     Dilated cardiomyopathy 10/26/2020    Drug abuse 10/2020    Headache 04/19/2023    Hyperglycemia 12/16/2022    Hyperosmolar hyperglycemic state (HHS) 05/25/2022    ICD (implantable cardioverter-defibrillator) in place 10/26/2020    Infected defibrillator now s/p removal Nov 2023 07/23/2023    Left ventricular assist device (LVAD) complication, initial encounter 06/05/2023    Muscle cramping 06/15/2022    Renal disorder     SOB (shortness of breath) 06/13/2022    Tingling in extremities 07/13/2022

## 2024-04-20 NOTE — H&P
[facility] Dept  Heart Transplant  H&P    Patient Name: Kevan Queen  MRN: [id number]  Admission Date: 4/20/2024  Attending Physician: Lola Todd MD  Primary Care Provider: Rosio Armendariz FNP  Principal Problem:Acute decompensated heart failure    Subjective:     History of Present Illness:  Patient is a 39 year old male with stage D 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 He underwent RVAD removal and chest closure 6/30/2022.  He also completed a course of IV Abx for staph epi. He was weaned off  but he had to restarted due to RVF. He was eventually transitioned to milrinone (secondary to  shortage) now on 0.25 mcg/kg/min. He has a history of unclear syncope vs seizures and is followed by neuro for this and is on Keppra.       On 11/15/23 underwent removal of eroded [employer] scICD--no Lifevest (due to LVAD) and no plan to replace ICD as not requiring therapy post LVAD with concern for reinfection.  He has not had neurology f/u with seizure hx on keppra so coordinator to assist him with obtaining appt.  Patient presents for concerns for fever, vomiting and pain and concerns of driveline infection, low INR and hyperglycemia. Beta hydroxybutyrate negative. Lactic acid negative. Normal WBC count and CT Abd/Pelv with contrast without signs of driveline infection. Had elevated blood glucose 03/19/2024 and was advised to go to ED. Also advised to go to ED in the first week of April for fever of 102*F (reported) but did not go at that time. Patient reports that he has been taking lower doses of Lasix at home as compared to what has been prescribed. Exam consistent with hypervolemia. Bedside TTE with IVC 2.5 cm and non collapsible IVC. No inflow  outflow cannula issues visualized.     Past Medical History:   Diagnosis Date    Acute respiratory failure with hypoxia 07/22/2023    Arthritis     Awareness alteration, transient 09/01/2022    Cardiomyopathy     CHF (congestive heart failure) 10/01/2020    COVID-19 06/03/2023    Diabetes mellitus     Dilated cardiomyopathy 10/26/2020    Drug abuse 10/2020    Headache 04/19/2023    Hyperglycemia 12/16/2022    Hyperosmolar hyperglycemic state (HHS) 05/25/2022    ICD (implantable cardioverter-defibrillator) in place 10/26/2020    Infected defibrillator now s/p removal Nov 2023 07/23/2023    Left ventricular assist device (LVAD) complication, initial encounter 06/05/2023    Muscle cramping 06/15/2022    Renal disorder     SOB (shortness of breath) 06/13/2022    Tingling in extremities 07/13/2022       Past Surgical History:   Procedure Laterality Date    APPLICATION OF WOUND VACUUM-ASSISTED CLOSURE DEVICE N/A 6/30/2022    Procedure: APPLICATION, WOUND VAC;  Surgeon: Luis F Paige MD;  Location: [facility] OR [address] [place] [address];  Service: Cardiovascular;  Laterality: N/A;  50 x 5 cm    CARDIAC DEFIBRILLATOR PLACEMENT      ECHOCARDIOGRAM,TRANSESOPHAGEAL  11/9/2023    Procedure: Transesophageal echo (GUILLERMO) intra-procedure log documentation;  Surgeon: Eron Ivy MD;  Location: [facility] EP LAB;  Service: Cardiology;;    EXTRACTION, ELECTRODE LEAD Left 11/9/2023    Procedure: EXTRACTION, ELECTRODE LEAD;  Surgeon: ADALID Leon MD;  Location: [facility] EP LAB;  Service: Cardiology;  Laterality: Left;  INFECTION, LEAD EXTRACTION, GUILLERMO, BSCI, ANES, EH,   *NO CTS BACKUP*    IMPLANTATION OF RIGHT VENTRICULAR ASSIST DEVICE (RVAD) N/A 6/29/2022    Procedure: INSERTION, RVAD;  Surgeon: Luis F Paige MD;  [facility];  Service: Cardiovascular;  Laterality: N/A;    INSERTION OF GRAFT TO PERICARDIUM Right 6/30/2022    Procedure: INSERTION-RIGHT VENTRICULAR ASSIST DEVICE;  Surgeon: Luis F Paige MD;  Location: [facility] OR [address] [place] [address];   Service: Cardiovascular;  Laterality: Right;    IRRIGATION OF MEDIASTINUM  6/30/2022    Procedure: IRRIGATION, MEDIASTINUM;  Surgeon: Luis F Paige MD;  [address]  Service: Cardiovascular;;    LEFT VENTRICULAR ASSIST DEVICE Left 6/23/2022    Procedure: INSERTION-LEFT VENTRICULAR ASSIST DEVICE;  Surgeon: Luis F Paige MD;  [facility];  Service: Cardiovascular;  Laterality: Left;    LEFT VENTRICULAR ASSIST DEVICE N/A 6/29/2022    Procedure: INSERTION-LEFT VENTRICULAR ASSIST DEVICE;  Surgeon: Luis F Paige MD;  [facility];  Service: Cardiovascular;  Laterality: N/A;    REVISION OF SKIN POCKET FOR CARDIOVERTER-DEFIBRILLATOR  11/9/2023    Procedure: REVISION, SKIN POCKET, FOR CARDIOVERTER-DEFIBRILLATOR;  Surgeon: ADALID Leon MD;  Location: [facility] EP LAB;  Service: Cardiology;;    RIGHT HEART CATHETERIZATION Right 4/8/2022    Procedure: INSERTION, CATHETER, RIGHT HEART;  Surgeon: Luca Lopez Jr., MD;  Location: [facility] CATH LAB;  Service: Cardiology;  Laterality: Right;    RIGHT HEART CATHETERIZATION Right 4/19/2022    Procedure: INSERTION, CATHETER, RIGHT HEART;  Surgeon: Josh Pulido MD;  Location: [facility] CATH LAB;  Service: Cardiology;  Laterality: Right;    RIGHT HEART CATHETERIZATION Right 7/21/2022    Procedure: INSERTION, CATHETER, RIGHT HEART;  Surgeon: Dalia Crum MD;  Location: [facility] CATH LAB;  Service: Cardiology;  Laterality: Right;    RIGHT HEART CATHETERIZATION Right 10/31/2022    Procedure: INSERTION, CATHETER, RIGHT HEART;  Surgeon: Dalia Crum MD;  Location: [facility] CATH LAB;  Service: Cardiology;  Laterality: Right;    STERNAL WOUND CLOSURE N/A 6/30/2022    Procedure: CLOSURE, WOUND, STERNUM;  Surgeon: Luis F Paige MD;  [address]  Service: Cardiovascular;  Laterality: N/A;       Review of patient's allergies indicates:   Allergen Reactions    Bumex [bumetanide] Hives    Torsemide Hives       Current  Facility-Administered Medications   Medication Dose Route Frequency Provider Last Rate Last Admin    0.9%  NaCl infusion  1,000 mL Intravenous Continuous Lola Todd MD        ceFEPIme (MAXIPIME) 2 g in dextrose 5 % in water (D5W) 100 mL IVPB (MB+)  2 g Intravenous ED 1 Time Lola Todd MD        dextrose 10% bolus 125 mL 125 mL  12.5 g Intravenous PRN Lola Todd MD        dextrose 10% bolus 250 mL 250 mL  25 g Intravenous PRN Lola Todd MD        dextrose 5 % and 0.45 % NaCl infusion   Intravenous Continuous PRN Lola Todd MD        insulin regular in 0.9 % NaCl 100 unit/100 mL (1 unit/mL) infusion  0-52 Units/hr Intravenous Continuous Lola Todd MD        sodium chloride 0.9% flush 10 mL  10 mL Intravenous PRN Lola Todd MD        vancomycin 1.75 g in 5 % dextrose 500 mL IVPB  20 mg/kg Intravenous ED 1 Time Lola Todd MD         Current Outpatient Medications   Medication Sig Dispense Refill    acetaminophen (TYLENOL) 325 MG tablet Take 2 tablets (650 mg total) by mouth every 6 (six) hours as needed for Pain.  0    aspirin (ECOTRIN) 81 MG EC tablet Take 1 tablet (81 mg total) by mouth once daily. 90 tablet 3    atorvastatin (LIPITOR) 40 MG tablet Take 1 tablet (40 mg total) by mouth once daily. 90 tablet 3    blood sugar diagnostic Strp Use to test blood glucose 4 (four) times daily. 200 each 5    blood-glucose meter (TRUE METRIX GLUCOSE METER) Misc Use to test blood glucose 4 (four) times daily. 1 each 0    blood-glucose meter Misc Use as instructed 1 each 0    cefadroxil (DURICEF) 500 MG Cap Take 1 capsule (500 mg total) by mouth every 12 (twelve) hours. 60 capsule 11    furosemide (LASIX) 80 MG tablet Take 1 tablet (80 mg total) by mouth once daily. 30 tablet 11    insulin aspart U-100 (NOVOLOG) 100 unit/mL (3 mL) InPn pen Inject 10 Units into the skin 3 (three) times daily with meals. Plus Sliding Scale: 151-200: +1, 201-250: +2, 251-300:  "+3, 301-350: +4 and call MD.  (Max daily dose:  60 units) 12 mL 5    insulin detemir U-100, Levemir, 100 unit/mL (3 mL) SubQ InPn pen Inject 30 Units into the skin once daily. 6 mL 5    lancets 33 gauge Misc Use to test blood glucose 4 (four) times daily. 200 each 3    levETIRAcetam (KEPPRA) 1000 MG tablet Take 1.5 tablets (1,500 mg total) by mouth 2 (two) times daily. 60 tablet 11    magnesium oxide (MAG-OX) 400 mg (241.3 mg magnesium) tablet Take 1 tablet (400 mg total) by mouth once daily. 30 tablet 11    milrinone 20mg/100ml D5W, 200mcg/ml, (PRIMACOR) 20 mg/100 mL (200 mcg/mL) infusion Inject 23.6 mcg/min into the vein continuous.      mirtazapine (REMERON) 15 MG tablet Take 1 tablet (15 mg total) by mouth nightly. 30 tablet 11    ondansetron (ZOFRAN-ODT) 4 MG TbDL Take 1 tablet (4 mg total) by mouth every 8 (eight) hours as needed (nausea). 10 tablet 0    pantoprazole (PROTONIX) 40 MG tablet Take 1 tablet (40 mg total) by mouth once daily. 30 tablet 11    pen needle, diabetic 32 gauge x 5/32" Ndle Use to inject insulin 5 (five) times daily. 200 each 5    polyethylene glycol (GLYCOLAX) 17 gram PwPk Take 17 g by mouth once daily. 30 each 1    potassium chloride SA (K-DUR,KLOR-CON) 20 MEQ tablet Take 20 mEq by mouth 3 (three) times daily. Take 2 tablets by mouth three times daily      sodium chloride 0.9% SolP 50 mL with DAPTOmycin 500 mg SolR 688 mg Inject 688 mg into the vein once daily.      spironolactone (ALDACTONE) 25 MG tablet Take 1 tablet (25 mg total) by mouth once daily. 30 tablet 11    warfarin (COUMADIN) 3 MG tablet Take 1.5 tablets (4.5mg) orally daily, except 2 tablets (6mg) on Wednesdays and Saturdays 60 tablet 5     Family History       Problem Relation (Age of Onset)    Diverticulosis Brother    Heart attack Maternal Grandmother, Maternal Grandfather    Heart failure Father          Tobacco Use    Smoking status: Former     Current packs/day: 0.00     Average packs/day: 0.5 packs/day for 16.6 " years (8.3 ttl pk-yrs)     Types: Cigarettes     Start date: 10/1/2004     Quit date: 2021     Years since quittin.9    Smokeless tobacco: Never   Substance and Sexual Activity    Alcohol use: Not Currently    Drug use: Not Currently     Types: Marijuana, MDMA (Ecstacy)    Sexual activity: Yes     Partners: Female     Birth control/protection: None     Review of Systems   Constitutional: Positive for fever. Negative for chills.   Cardiovascular:  Negative for chest pain, orthopnea and palpitations.   Respiratory:  Negative for cough and shortness of breath.    Gastrointestinal:  Positive for nausea and vomiting. Negative for abdominal pain.   Neurological:  Negative for dizziness, headaches and light-headedness.   Psychiatric/Behavioral:  Negative for altered mental status.      Objective:     Vital Signs (Most Recent):  Temp: 98.3 °F (36.8 °C) (24 0124)  Pulse: (!) 111 (24 022)  Resp: (!) 32 (24)  BP: (!) 90/0 (24 0252)  SpO2: 100 % (24) Vital Signs (24h Range):  Temp:  [98.3 °F (36.8 °C)] 98.3 °F (36.8 °C)  Pulse:  [111-112] 111  Resp:  [19-32] 32  SpO2:  [100 %] 100 %  BP: (90)/(0) 90/0     Weight: 86.2 kg (190 lb)  Body mass index is 23.75 kg/m².    SpO2: 100 %       No intake or output data in the 24 hours ending 24 0309    Lines/Drains/Airways       Peripherally Inserted Central Catheter Line  Duration             PICC Double Lumen 23 1150 right brachial 159 days              Line  Duration                  VAD 22 1115 Left ventricular assist device HeartMate 3 660 days              Peripheral Intravenous Line  Duration                  Peripheral IV - Single Lumen 24 0210 20 G Left Antecubital <1 day         Peripheral IV - Single Lumen 24 0220 22 G Left;Posterior Hand <1 day                     Physical Exam  Vitals and nursing note reviewed.   Constitutional:       General: He is not in acute distress.     Appearance: Normal  appearance. He is not ill-appearing or toxic-appearing.   HENT:      Head: Normocephalic and atraumatic.      Mouth/Throat:      Mouth: Mucous membranes are dry.      Neck: JVP to the mandible at 30* angle  Eyes:      Extraocular Movements: Extraocular movements intact.   Cardiovascular:      Heart sounds: No murmur heard.     No gallop.      Comments: VAD hum+  PICC line with milrinone  Pulmonary:      Effort: Pulmonary effort is normal. No respiratory distress.      Breath sounds: Normal breath sounds.   Abdominal:      Palpations: Abdomen is soft.      Tenderness: There is no abdominal tenderness.      Comments: DLES 1   Musculoskeletal:      Right lower leg: Edema (2+ to the knees).      Left lower leg: Edema (2+ to the knees).   Skin:     General: Skin is warm.   Neurological:      Mental Status: He is alert and oriented to person, place, and time. Mental status is at baseline.        Significant Labs: BMP:   Recent Labs   Lab 04/20/24  0209   *   K 4.3   CL 95   CALCIUM 9.0   , CMP   Recent Labs   Lab 04/20/24 0209   *   K 4.3   CL 95   CALCIUM 9.0   ALBUMIN 3.1*   , CBC   Recent Labs   Lab 04/20/24 0209 04/20/24  0222   WBC 6.23  --    HGB 8.3*  --    HCT 29.5* 36     --    , INR   Recent Labs   Lab 04/20/24 0209   INR 1.9*   , and All pertinent lab results from the last 24 hours have been reviewed.    Significant Imaging: CT scan: CT ABDOMEN PELVIS WITH CONTRAST: No results found for this visit on 04/20/24. and CT ABDOMEN PELVIS WITHOUT CONTRAST: No results found for this visit on 04/20/24. and Echocardiogram: Transthoracic echo (TTE) complete (Cupid Only):   Results for orders placed or performed during the hospital encounter of 08/31/23   Echo   Result Value Ref Range    Ascending aorta 2.99 cm    STJ 2.54 cm    IVS 0.75 0.6 - 1.1 cm    LA size 2.80 cm    Left Atrium Major Axis 6.27 cm    Left Atrium Minor Axis 5.13 cm    LVIDd 7.11 (A) 3.5 - 6.0 cm    LVIDs 7.01 (A) 2.1 - 4.0 cm     LVOT diameter 2.28 cm    Posterior Wall 0.57 (A) 0.6 - 1.1 cm    MV Peak A Carloz 0.61 m/s    E wave deceleration time 113.67 msec    MV Peak E Carloz 0.64 m/s    RA Major Axis 5.87 cm    RA Width 4.79 cm    RVDD 6.66 cm    Sinus 3.04 cm    TAPSE 1.16 cm    TR Max Carloz 2.72 m/s    TDI LATERAL 0.05 m/s    TDI SEPTAL 0.04 m/s    LA WIDTH 4.09 cm    MV stenosis pressure 1/2 time 32.97 ms    LV Diastolic Volume 264.44 mL    LV Systolic Volume 256.33 mL    RV S' 7.57 cm/s    MV mean gradient 1 mmHg    MV peak gradient 3 mmHg    MV VTI 17.13 cm    LV LATERAL E/E' RATIO 12.80 m/s    LV SEPTAL E/E' RATIO 16.00 m/s    FS 1 %    LA volume 54.93 cm3    LV mass 199.99 g    ZLVIDD -0.40     ZLVIDS 3.51     Left Ventricle Relative Wall Thickness 0.16 cm    MV valve area p 1/2 method 6.67 cm2    E/A ratio 1.05     Mean e' 0.05 m/s    LVOT area 4.1 cm2    E/E' ratio 14.22 m/s    LV Systolic Volume Index 117.0 mL/m2    LV Diastolic Volume Index 120.75 mL/m2    LA Volume Index 25.1 mL/m2    LV Mass Index 91 g/m2    Triscuspid Valve Regurgitation Peak Gradient 30 mmHg    BSA 2.18 m2    TV resting pulmonary artery pressure 38 mmHg    RV TB RVSP 11 mmHg    Est. RA pres 8 mmHg    Narrative      LVAD: The aortic valve does not open. The ventricular septum is at   midline.HM3 5100    Left Ventricle: The left ventricle is severely dilated. Ventricular   mass is normal. Normal wall thickness. Normal wall motion. There is   severely reduced systolic function with a visually estimated ejection   fraction of 10 -15%. There is normal diastolic function.    Right Ventricle: Normal right ventricular cavity size. Wall thickness   is normal. Right ventricle wall motion  is normal. Systolic function is   normal.    Mitral Valve: There is mild regurgitation.    Tricuspid Valve: There is moderate to severe regurgitation.    Pulmonary Artery: The estimated pulmonary artery systolic pressure is   38 mmHg.    IVC/SVC: Intermediate venous pressure at 8 mmHg.        Assessment/Plan:     * Acute decompensated Heart Failure   Patient is a 39 year old male with stage D 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 Infected [employer] scICD that was extracted 11/2023, no LifeVest due to VAD present and no hx of VT shocks. Presents with reported fever and nausea, blood glucose uncontrolled on admission. Patient reports that he has been taking lower doses of Lasix at home as compared to what has been prescribed. Exam consistent with hypervolemia. Bedside TTE with IVC 2.5 cm and non collapsible IVC. No inflow outflow cannula issues visualized.   Admit to [facility] for acute decompensated HF + HHS, Blood cultures x 2 in ED, given abx in ED  Start Lasix 80 mg IVP BID AC (initially planned for Lasix gtt @ 10 mg/hr but when administered Lasix, patient had robust urine output thus changed to BID AC)     - gabapentin for cramps  CVP BID + SvO2 q AM hemodynamic monitoring  K 40 mEq PO Daily  Insulin + BMP q 4 hrs while inpatient to control hyperglycemia  Telemetry monitoring  Daily standing weights, if able  GDMT as tolerated      Type 2 diabetes mellitus with hyperosmolar hyperglycemic state (HHS)  Type 2 diabetes mellitus with hyperglycemia  Blood glucose 592 mg/dL on ISTAT, anion gap 13, with blood glucose on serum 602 mg/dL  UA on admit does not show signs of infection  Hx of A1c 14%  Most recent A1c 04/02/2024 7.9%  Insulin gtt + BMP q 4 hours  Endocrinology consult in AM, appreciate recommendations    Chronic anticoagulation  Subtherapeutic INR   INR 1.9 on admit  Continue warfarin    Infected defibrillator now s/p removal Nov 2023  Infected [employer] scICD that was extracted 11/2023  No LifeVest due to VAD present and no hx of VT shocks.    LVAD  (left ventricular assist device) present  Procedure: Device Interrogation Including analysis of device parameters  Current Settings: Ventricular Assist Device  Review of device function is stable/unstable stable        1/3/2024     2:33 PM 11/15/2023     3:26 PM 11/15/2023    11:10 AM 11/15/2023     7:34 AM 11/15/2023     5:24 AM 11/14/2023     8:55 PM 11/14/2023     4:46 PM   TXP LVAD INTERROGATIONS   Type  HeartMate3 HeartMate3 HeartMate3 HeartMate3 HeartMate3 HeartMate3   Flow  4.2 4.1 4.2 4.2 4.1 4.3   Speed  5100 5150 5150 5100 5100 5150   PI  6.1 5.2 5.8 5.5 4.9 4.7   Power (Serna)  3.6 3.6 3.6 3.7 3.6 3.6   LSL    4750   4750   Pulsatility No Pulse   Intermittent pulse Intermittent pulse Intermittent pulse Intermittent pulse       CHF (NYHA class IV, ACC/AHA stage D)  Patient is a 39 year old male with stage D 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      Anemia of chronic disease  Significantly low iron < 10 in 06/2023 with elevated TIBC, could be iron deficiency anemia   With MCV in 60s could also consider a mild thalassemia on differential    Discussed with staff.    Rebel Sanderson MD  Heart Transplant PGY6  [facility] Dept

## 2024-04-20 NOTE — SUBJECTIVE & OBJECTIVE
Past Medical History:   Diagnosis Date    Acute respiratory failure with hypoxia 07/22/2023    Arthritis     Awareness alteration, transient 09/01/2022    Cardiomyopathy     CHF (congestive heart failure) 10/01/2020    COVID-19 06/03/2023    Diabetes mellitus     Dilated cardiomyopathy 10/26/2020    Drug abuse 10/2020    Headache 04/19/2023    Hyperglycemia 12/16/2022    Hyperosmolar hyperglycemic state (HHS) 05/25/2022    ICD (implantable cardioverter-defibrillator) in place 10/26/2020    Infected defibrillator now s/p removal Nov 2023 07/23/2023    Left ventricular assist device (LVAD) complication, initial encounter 06/05/2023    Muscle cramping 06/15/2022    Renal disorder     SOB (shortness of breath) 06/13/2022    Tingling in extremities 07/13/2022       Past Surgical History:   Procedure Laterality Date    APPLICATION OF WOUND VACUUM-ASSISTED CLOSURE DEVICE N/A 6/30/2022    Procedure: APPLICATION, WOUND VAC;  Surgeon: Luis F Paige MD;  Location: [facility] OR [address] [place] [address];  Service: Cardiovascular;  Laterality: N/A;  50 x 5 cm    CARDIAC DEFIBRILLATOR PLACEMENT      ECHOCARDIOGRAM,TRANSESOPHAGEAL  11/9/2023    Procedure: Transesophageal echo (GUILLERMO) intra-procedure log documentation;  Surgeon: Eron Ivy MD;  Location: [facility] EP LAB;  Service: Cardiology;;    EXTRACTION, ELECTRODE LEAD Left 11/9/2023    Procedure: EXTRACTION, ELECTRODE LEAD;  Surgeon: ADALID Leon MD;  Location: [facility] EP LAB;  Service: Cardiology;  Laterality: Left;  INFECTION, LEAD EXTRACTION, GUILLERMO, BSCI, ANES, EH,   *NO CTS BACKUP*    IMPLANTATION OF RIGHT VENTRICULAR ASSIST DEVICE (RVAD) N/A 6/29/2022    Procedure: INSERTION, RVAD;  Surgeon: Luis F Paige MD;  Location: [facility] OR [address] [place] [address];  Service: Cardiovascular;  Laterality: N/A;    INSERTION OF GRAFT TO PERICARDIUM Right 6/30/2022    Procedure: INSERTION-RIGHT VENTRICULAR ASSIST DEVICE;  Surgeon: Luis F Paige MD;  Location: [facility] OR [address] [place] [address];  Service:  Cardiovascular;  Laterality: Right;    IRRIGATION OF MEDIASTINUM  6/30/2022    Procedure: IRRIGATION, MEDIASTINUM;  Surgeon: Luis F Paige MD;  [address];  Service: Cardiovascular;;    LEFT VENTRICULAR ASSIST DEVICE Left 6/23/2022    Procedure: INSERTION-LEFT VENTRICULAR ASSIST DEVICE;  Surgeon: Luis F Paige MD;  [facility];  Service: Cardiovascular;  Laterality: Left;    LEFT VENTRICULAR ASSIST DEVICE N/A 6/29/2022    Procedure: INSERTION-LEFT VENTRICULAR ASSIST DEVICE;  Surgeon: Luis F Paige MD;  [facility];  Service: Cardiovascular;  Laterality: N/A;    REVISION OF SKIN POCKET FOR CARDIOVERTER-DEFIBRILLATOR  11/9/2023    Procedure: REVISION, SKIN POCKET, FOR CARDIOVERTER-DEFIBRILLATOR;  Surgeon: ADALID Leon MD;  Location: [facility] EP LAB;  Service: Cardiology;;    RIGHT HEART CATHETERIZATION Right 4/8/2022    Procedure: INSERTION, CATHETER, RIGHT HEART;  Surgeon: Luca Lopez Jr., MD;  Location: [facility] CATH LAB;  Service: Cardiology;  Laterality: Right;    RIGHT HEART CATHETERIZATION Right 4/19/2022    Procedure: INSERTION, CATHETER, RIGHT HEART;  Surgeon: Jsoh Pulido MD;  Location: [facility] CATH LAB;  Service: Cardiology;  Laterality: Right;    RIGHT HEART CATHETERIZATION Right 7/21/2022    Procedure: INSERTION, CATHETER, RIGHT HEART;  Surgeon: Dalia Crum MD;  Location: [facility] CATH LAB;  Service: Cardiology;  Laterality: Right;    RIGHT HEART CATHETERIZATION Right 10/31/2022    Procedure: INSERTION, CATHETER, RIGHT HEART;  Surgeon: Dalia Crum MD;  Location: [facility] CATH LAB;  Service: Cardiology;  Laterality: Right;    STERNAL WOUND CLOSURE N/A 6/30/2022    Procedure: CLOSURE, WOUND, STERNUM;  Surgeon: Luis F Paige MD;  [facility];  Service: Cardiovascular;  Laterality: N/A;       Review of patient's allergies indicates:   Allergen Reactions    Bumex [bumetanide] Hives    Torsemide Hives       Current  Facility-Administered Medications   Medication Dose Route Frequency Provider Last Rate Last Admin    0.9%  NaCl infusion  1,000 mL Intravenous Continuous Lola Todd MD        ceFEPIme (MAXIPIME) 2 g in dextrose 5 % in water (D5W) 100 mL IVPB (MB+)  2 g Intravenous ED 1 Time Lola Todd MD        dextrose 10% bolus 125 mL 125 mL  12.5 g Intravenous PRN Lola Todd MD        dextrose 10% bolus 250 mL 250 mL  25 g Intravenous PRN Lola Todd MD        dextrose 5 % and 0.45 % NaCl infusion   Intravenous Continuous PRN Lola Todd MD        insulin regular in 0.9 % NaCl 100 unit/100 mL (1 unit/mL) infusion  0-52 Units/hr Intravenous Continuous Lola Todd MD        sodium chloride 0.9% flush 10 mL  10 mL Intravenous PRN Lola Todd MD        vancomycin 1.75 g in 5 % dextrose 500 mL IVPB  20 mg/kg Intravenous ED 1 Time Lola Todd MD         Current Outpatient Medications   Medication Sig Dispense Refill    acetaminophen (TYLENOL) 325 MG tablet Take 2 tablets (650 mg total) by mouth every 6 (six) hours as needed for Pain.  0    aspirin (ECOTRIN) 81 MG EC tablet Take 1 tablet (81 mg total) by mouth once daily. 90 tablet 3    atorvastatin (LIPITOR) 40 MG tablet Take 1 tablet (40 mg total) by mouth once daily. 90 tablet 3    blood sugar diagnostic Strp Use to test blood glucose 4 (four) times daily. 200 each 5    blood-glucose meter (TRUE METRIX GLUCOSE METER) Misc Use to test blood glucose 4 (four) times daily. 1 each 0    blood-glucose meter Misc Use as instructed 1 each 0    cefadroxil (DURICEF) 500 MG Cap Take 1 capsule (500 mg total) by mouth every 12 (twelve) hours. 60 capsule 11    furosemide (LASIX) 80 MG tablet Take 1 tablet (80 mg total) by mouth once daily. 30 tablet 11    insulin aspart U-100 (NOVOLOG) 100 unit/mL (3 mL) InPn pen Inject 10 Units into the skin 3 (three) times daily with meals. Plus Sliding Scale: 151-200: +1,  "201-250: +2, 251-300: +3, 301-350: +4 and call MD.  (Max daily dose:  60 units) 12 mL 5    insulin detemir U-100, Levemir, 100 unit/mL (3 mL) SubQ InPn pen Inject 30 Units into the skin once daily. 6 mL 5    lancets 33 gauge Misc Use to test blood glucose 4 (four) times daily. 200 each 3    levETIRAcetam (KEPPRA) 1000 MG tablet Take 1.5 tablets (1,500 mg total) by mouth 2 (two) times daily. 60 tablet 11    magnesium oxide (MAG-OX) 400 mg (241.3 mg magnesium) tablet Take 1 tablet (400 mg total) by mouth once daily. 30 tablet 11    milrinone 20mg/100ml D5W, 200mcg/ml, (PRIMACOR) 20 mg/100 mL (200 mcg/mL) infusion Inject 23.6 mcg/min into the vein continuous.      mirtazapine (REMERON) 15 MG tablet Take 1 tablet (15 mg total) by mouth nightly. 30 tablet 11    ondansetron (ZOFRAN-ODT) 4 MG TbDL Take 1 tablet (4 mg total) by mouth every 8 (eight) hours as needed (nausea). 10 tablet 0    pantoprazole (PROTONIX) 40 MG tablet Take 1 tablet (40 mg total) by mouth once daily. 30 tablet 11    pen needle, diabetic 32 gauge x 5/32" Ndle Use to inject insulin 5 (five) times daily. 200 each 5    polyethylene glycol (GLYCOLAX) 17 gram PwPk Take 17 g by mouth once daily. 30 each 1    potassium chloride SA (K-DUR,KLOR-CON) 20 MEQ tablet Take 20 mEq by mouth 3 (three) times daily. Take 2 tablets by mouth three times daily      sodium chloride 0.9% SolP 50 mL with DAPTOmycin 500 mg SolR 688 mg Inject 688 mg into the vein once daily.      spironolactone (ALDACTONE) 25 MG tablet Take 1 tablet (25 mg total) by mouth once daily. 30 tablet 11    warfarin (COUMADIN) 3 MG tablet Take 1.5 tablets (4.5mg) orally daily, except 2 tablets (6mg) on Wednesdays and Saturdays 60 tablet 5     Family History       Problem Relation (Age of Onset)    Diverticulosis Brother    Heart attack Maternal Grandmother, Maternal Grandfather    Heart failure Father          Tobacco Use    Smoking status: Former     Current packs/day: 0.00     Average " packs/day: 0.5 packs/day for 16.6 years (8.3 ttl pk-yrs)     Types: Cigarettes     Start date: 10/1/2004     Quit date: 2021     Years since quittin.9    Smokeless tobacco: Never   Substance and Sexual Activity    Alcohol use: Not Currently    Drug use: Not Currently     Types: Marijuana, MDMA (Ecstacy)    Sexual activity: Yes     Partners: Female     Birth control/protection: None     Review of Systems   Constitutional: Positive for fever. Negative for chills.   Cardiovascular:  Negative for chest pain, orthopnea and palpitations.   Respiratory:  Negative for cough and shortness of breath.    Gastrointestinal:  Positive for nausea and vomiting. Negative for abdominal pain.   Neurological:  Negative for dizziness, headaches and light-headedness.   Psychiatric/Behavioral:  Negative for altered mental status.      Objective:     Vital Signs (Most Recent):  Temp: 98.3 °F (36.8 °C) (24 0124)  Pulse: (!) 111 (24 022)  Resp: (!) 32 (24)  BP: (!) 90/0 (24 0252)  SpO2: 100 % (24) Vital Signs (24h Range):  Temp:  [98.3 °F (36.8 °C)] 98.3 °F (36.8 °C)  Pulse:  [111-112] 111  Resp:  [19-32] 32  SpO2:  [100 %] 100 %  BP: (90)/(0) 90/0     Weight: 86.2 kg (190 lb)  Body mass index is 23.75 kg/m².    SpO2: 100 %       No intake or output data in the 24 hours ending 24 0309    Lines/Drains/Airways       Peripherally Inserted Central Catheter Line  Duration             PICC Double Lumen 23 1150 right brachial 159 days              Line  Duration                  VAD 22 1115 Left ventricular assist device HeartMate 3 660 days              Peripheral Intravenous Line  Duration                  Peripheral IV - Single Lumen 24 0210 20 G Left Antecubital <1 day         Peripheral IV - Single Lumen 24 0220 22 G Left;Posterior Hand <1 day                     Physical Exam  Vitals and nursing note reviewed.   Constitutional:       General: He is not in  acute distress.     Appearance: Normal appearance. He is not ill-appearing or toxic-appearing.   HENT:      Head: Normocephalic and atraumatic.      Mouth/Throat:      Mouth: Mucous membranes are dry.   Eyes:      Extraocular Movements: Extraocular movements intact.   Cardiovascular:      Heart sounds: No murmur heard.     No gallop.      Comments: VAD hum+  PICC line with milrinone  Pulmonary:      Effort: Pulmonary effort is normal. No respiratory distress.      Breath sounds: Normal breath sounds.   Abdominal:      Palpations: Abdomen is soft.      Tenderness: There is no abdominal tenderness.      Comments: DLES 1   Musculoskeletal:      Right lower leg: No edema.      Left lower leg: No edema.   Skin:     General: Skin is warm.   Neurological:      Mental Status: He is alert and oriented to person, place, and time. Mental status is at baseline.        Significant Labs: BMP:   Recent Labs   Lab 04/20/24 0209   *   K 4.3   CL 95   CALCIUM 9.0   , CMP   Recent Labs   Lab 04/20/24 0209   *   K 4.3   CL 95   CALCIUM 9.0   ALBUMIN 3.1*   , CBC   Recent Labs   Lab 04/20/24 0209 04/20/24  0222   WBC 6.23  --    HGB 8.3*  --    HCT 29.5* 36     --    , INR   Recent Labs   Lab 04/20/24 0209   INR 1.9*   , and All pertinent lab results from the last 24 hours have been reviewed.    Significant Imaging: CT scan: CT ABDOMEN PELVIS WITH CONTRAST: No results found for this visit on 04/20/24. and CT ABDOMEN PELVIS WITHOUT CONTRAST: No results found for this visit on 04/20/24. and Echocardiogram: Transthoracic echo (TTE) complete (Cupid Only):   Results for orders placed or performed during the hospital encounter of 08/31/23   Echo   Result Value Ref Range    Ascending aorta 2.99 cm    STJ 2.54 cm    IVS 0.75 0.6 - 1.1 cm    LA size 2.80 cm    Left Atrium Major Axis 6.27 cm    Left Atrium Minor Axis 5.13 cm    LVIDd 7.11 (A) 3.5 - 6.0 cm    LVIDs 7.01 (A) 2.1 - 4.0 cm    LVOT diameter 2.28 cm    Posterior  Wall 0.57 (A) 0.6 - 1.1 cm    MV Peak A Carloz 0.61 m/s    E wave deceleration time 113.67 msec    MV Peak E Carloz 0.64 m/s    RA Major Axis 5.87 cm    RA Width 4.79 cm    RVDD 6.66 cm    Sinus 3.04 cm    TAPSE 1.16 cm    TR Max Carloz 2.72 m/s    TDI LATERAL 0.05 m/s    TDI SEPTAL 0.04 m/s    LA WIDTH 4.09 cm    MV stenosis pressure 1/2 time 32.97 ms    LV Diastolic Volume 264.44 mL    LV Systolic Volume 256.33 mL    RV S' 7.57 cm/s    MV mean gradient 1 mmHg    MV peak gradient 3 mmHg    MV VTI 17.13 cm    LV LATERAL E/E' RATIO 12.80 m/s    LV SEPTAL E/E' RATIO 16.00 m/s    FS 1 %    LA volume 54.93 cm3    LV mass 199.99 g    ZLVIDD -0.40     ZLVIDS 3.51     Left Ventricle Relative Wall Thickness 0.16 cm    MV valve area p 1/2 method 6.67 cm2    E/A ratio 1.05     Mean e' 0.05 m/s    LVOT area 4.1 cm2    E/E' ratio 14.22 m/s    LV Systolic Volume Index 117.0 mL/m2    LV Diastolic Volume Index 120.75 mL/m2    LA Volume Index 25.1 mL/m2    LV Mass Index 91 g/m2    Triscuspid Valve Regurgitation Peak Gradient 30 mmHg    BSA 2.18 m2    TV resting pulmonary artery pressure 38 mmHg    RV TB RVSP 11 mmHg    Est. RA pres 8 mmHg    Narrative      LVAD: The aortic valve does not open. The ventricular septum is at   midline.HM3 5100    Left Ventricle: The left ventricle is severely dilated. Ventricular   mass is normal. Normal wall thickness. Normal wall motion. There is   severely reduced systolic function with a visually estimated ejection   fraction of 10 -15%. There is normal diastolic function.    Right Ventricle: Normal right ventricular cavity size. Wall thickness   is normal. Right ventricle wall motion  is normal. Systolic function is   normal.    Mitral Valve: There is mild regurgitation.    Tricuspid Valve: There is moderate to severe regurgitation.    Pulmonary Artery: The estimated pulmonary artery systolic pressure is   38 mmHg.    IVC/SVC: Intermediate venous pressure at 8 mmHg.

## 2024-04-20 NOTE — NURSING
Pt.'s doppler this am is 92/0.pt denies HA and lightheadedness. DEYA Martinez notified. Correlating tachycardia and hypertension to pt.'s muscle cramps, rating pain at 10/10. Order placed for one time administration of Robaxin PO.     1055: Rechecked doppler 94/0, muscle cramps with very mild relief. Notified DEYA Cope. Added cyclobenzaprine TID to help with muscle spasms.

## 2024-04-20 NOTE — PROCEDURES
Interrogation of Ventricular assist device was performed with physician analysis of device parameters and review of device function. I have personally reviewed the interrogation findings and agree with findings as stated.   Procedure: Device Interrogation Including analysis of device parameters  Current Settings: Ventricular Assist Device  Review of device function is stable      4/20/2024    12:09 PM 4/20/2024     8:10 AM 4/20/2024     5:34 AM 4/20/2024     3:13 AM 1/3/2024     2:33 PM 11/15/2023     3:26 PM 11/15/2023    11:10 AM   TXP LVAD INTERROGATIONS   Type HeartMate3 HeartMate3 HeartMate3 HeartMate3  HeartMate3 HeartMate3   Flow 4.2 4.2 4.2 4.4  4.2 4.1   Speed 5100 5100 5100 5100  5100 5150   PI 5.4 5.7 5.9 4.4  6.1 5.2   Power (Serna) 3.6 3.8 3.7 3.7  3.6 3.6   LSL 4700 4700 4700       Pulsatility Intermittent pulse  Intermittent pulse Intermittent pulse No Pulse        Natalya Villatoro MD

## 2024-04-20 NOTE — PLAN OF CARE
Plan of care discussed with patient and family.  Patient ambulating independently, fall precautions in place. No falls or injuries throughout  the shift. LVAD DP and numbers WNL, smooth LVAD hum. No LFA. Patient with intermittent muscle spasms, managing with cyclobenzaprine TID and Robaxin dose, with occasional relief. Discussed medications and care. Infusing Milrinone gtts @ 0.25 mcg/kg/min. Insulin gtts continued per protocol along with Q 1 POCT bG checks.  Patient has no questions at this time.     Problem: Adult Inpatient Plan of Care  Goal: Plan of Care Review  Outcome: Ongoing, Progressing  Goal: Patient-Specific Goal (Individualized)  Outcome: Ongoing, Progressing  Goal: Absence of Hospital-Acquired Illness or Injury  Outcome: Ongoing, Progressing  Goal: Optimal Comfort and Wellbeing  Outcome: Ongoing, Progressing  Goal: Readiness for Transition of Care  Outcome: Ongoing, Progressing     Problem: Infection  Goal: Absence of Infection Signs and Symptoms  Outcome: Ongoing, Progressing     Problem: Diabetes Comorbidity  Goal: Blood Glucose Level Within Targeted Range  Outcome: Ongoing, Progressing     Problem: Fall Injury Risk  Goal: Absence of Fall and Fall-Related Injury  Outcome: Ongoing, Progressing     Problem: Adjustment to Illness (Heart Failure)  Goal: Optimal Coping  Outcome: Ongoing, Progressing     Problem: Cardiac Output Decreased (Heart Failure)  Goal: Optimal Cardiac Output  Outcome: Ongoing, Progressing     Problem: Dysrhythmia (Heart Failure)  Goal: Stable Heart Rate and Rhythm  Outcome: Ongoing, Progressing     Problem: Fluid Imbalance (Heart Failure)  Goal: Fluid Balance  Outcome: Ongoing, Progressing     Problem: Functional Ability Impaired (Heart Failure)  Goal: Optimal Functional Ability  Outcome: Ongoing, Progressing     Problem: Oral Intake Inadequate (Heart Failure)  Goal: Optimal Nutrition Intake  Outcome: Ongoing, Progressing     Problem: Respiratory Compromise (Heart Failure)  Goal:  Effective Oxygenation and Ventilation  Outcome: Ongoing, Progressing     Problem: Sleep Disordered Breathing (Heart Failure)  Goal: Effective Breathing Pattern During Sleep  Outcome: Ongoing, Progressing     Problem: Adjustment to Device (Ventricular Assist Device)  Goal: Optimal Adjustment to Device  Outcome: Ongoing, Progressing     Problem: Bleeding (Ventricular Assist Device)  Goal: Absence of Bleeding  Outcome: Ongoing, Progressing     Problem: Embolism (Ventricular Assist Device)  Goal: Absence of Embolism Signs and Symptoms  Outcome: Ongoing, Progressing     Problem: Hemodynamic Instability (Ventricular Assist Device)  Goal: Optimal Blood Flow  Outcome: Ongoing, Progressing     Problem: Infection (Ventricular Assist Device)  Goal: Absence of Infection Signs and Symptoms  Outcome: Ongoing, Progressing     Problem: Right-Sided Heart Compromise (Ventricular Assist Device)  Goal: Effective Right-Sided Heart Function  Outcome: Ongoing, Progressing

## 2024-04-20 NOTE — ASSESSMENT & PLAN NOTE
Procedure: Device Interrogation Including analysis of device parameters  Current Settings: Ventricular Assist Device  Review of device function is stable/unstable stable        1/3/2024     2:33 PM 11/15/2023     3:26 PM 11/15/2023    11:10 AM 11/15/2023     7:34 AM 11/15/2023     5:24 AM 11/14/2023     8:55 PM 11/14/2023     4:46 PM   TXP LVAD INTERROGATIONS   Type  HeartMate3 HeartMate3 HeartMate3 HeartMate3 HeartMate3 HeartMate3   Flow  4.2 4.1 4.2 4.2 4.1 4.3   Speed  5100 5150 5150 5100 5100 5150   PI  6.1 5.2 5.8 5.5 4.9 4.7   Power (Serna)  3.6 3.6 3.6 3.7 3.6 3.6   LSL    4750   4750   Pulsatility No Pulse   Intermittent pulse Intermittent pulse Intermittent pulse Intermittent pulse

## 2024-04-20 NOTE — HPI
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 He underwent RVAD removal and chest closure 6/30/2022.  He also completed a course of IV Abx for staph epi. He was weaned off  but he had to restarted due to RVF. He was eventually transitioned to milrinone (secondary to  shortage) now on 0.25 mcg/kg/min. He has a history of unclear syncope vs seizures and is followed by neuro for this and is on Keppra.       On 11/15/23 underwent removal of eroded [employer] scICD--no Lifevest (due to LVAD) and no plan to replace ICD as not requiring therapy post LVAD with concern for reinfection.  He has not had neurology f/u with seizure hx on keppra so coordinator to assist him with obtaining appt.  Patient presents for concerns for fever, vomiting and pain and concerns of driveline infection, low INR and hyperglycemia. Beta hydroxybutyrate negative. Lactic acid negative. Normal WBC count and CT Abd/Pelv with contrast without signs of driveline infection. Had elevated blood glucose 03/19/2024 and was advised to go to ED. Also advised to go to ED in the first week of April for fever of 102*F (reported) but did not go at that time. Patient reports that he has been taking lower doses of Lasix at home as compared to what has been prescribed. Exam consistent with hypervolemia. Bedside TTE with IVC 2.5 cm and non collapsible IVC. No inflow outflow cannula issues visualized.

## 2024-04-20 NOTE — HPI
Reason for Consult: Management of T2DM, Hyperglycemia      Surgical Procedure and Date: LVAD 06/29/2022     Diabetes diagnosis year: 2022     Home Diabetes Medications:   -Levemir 30 units QD   -Novolog 20 units TIDWM in addition to the following correction scale:    150 - 200 + 2 unit    201 - 250 + 4 units    251 - 300 + 6 units    301 - 350 + 8 units       > 350   + 10 units     How often checking glucose at home?  > 4 times per day  BG readings on regimen: 180's-200's  Hypoglycemia on the regimen?  No  Missed doses on regimen?  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 He underwent RVAD removal and chest closure 6/30/2022.  He also completed a course of IV Abx for staph epi. He was weaned off  but he had to restarted due to RVF. He was eventually transitioned to milrinone (secondary to  shortage) now on 0.25 mcg/kg/min. He has a history of unclear syncope vs seizures and is followed by neuro for this and is on Keppra.   On 11/15/23 underwent removal of eroded [employer] scICD--no Lifevest (due to LVAD) and no plan to replace ICD as not requiring therapy post LVAD with concern for reinfection.  He has not had neurology f/u with seizure hx on keppra so coordinator to assist him with obtaining appt.  Patient presents for concerns for fever, vomiting and pain and concerns of driveline infection, low INR and hyperglycemia. Beta hydroxybutyrate negative. Lactic acid negative. Normal WBC count and CT Abd/Pelv with contrast without signs  of driveline infection. Had elevated blood glucose 03/19/2024 and was advised to go to ED. Also advised to go to ED in the first week of April for fever of 102*F (reported) but did not go at that time. Patient reports that he has been taking lower doses of Lasix at home as compared to what has been prescribed. Exam consistent with hypervolemia. Bedside TTE with IVC 2.5 cm and non collapsible IVC. No inflow outflow cannula issues visualized. Endocrine consulted to manage hyperglycemia and type 2 diabetes.     Lab Results   Component Value Date    HGBA1C >14.0 (H) 02/17/2024

## 2024-04-20 NOTE — ASSESSMENT & PLAN NOTE
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 Infected [employer] scICD that was extracted 11/2023, no LifeVest due to VAD present and no hx of VT shocks. Presents with reported fever and nausea, blood glucose uncontrolled on admission.

## 2024-04-20 NOTE — ASSESSMENT & PLAN NOTE
-NICM s/p HM 3 on home Milrinone   -Last 2D Echo  9/5/23 : LVEF 10-15%, LVEDD  7.11 cm with speed at 5100  -Last RHC: 10/31/22 with speed at 5100 on Milrinone 0.125 RA: 18, RV: 35/7 (22), PAP: 35/19 (24), PWP: 21, CO: 3.55, CI: 1.6  -Hypervolemic on examination today  -Diuresed on IV Lasix and was transitioned to po Lasix at 40mg Qdaily.  He is net negative 6.9L throughout hospital stay.  CVP: 12, SVo2: 54, CO: 5.68, CI: 2.74, SVR: 1098.  He refused his Lasix po dose this am  -GDMT with N/A  -2g Na dietary restriction, 1500 mL fluid restriction, strict I/Os

## 2024-04-20 NOTE — CONSULTS
[facility] - [facility] Step[facility]  Endocrinology  Diabetes Consult Note    Consult Requested by: Natalya Villatoro MD   Reason for admit: Acute decompensated heart failure    HISTORY OF PRESENT ILLNESS:  Reason for Consult: Management of T2DM, Hyperglycemia      Surgical Procedure and Date: LVAD 06/29/2022     Diabetes diagnosis year: 2022     Home Diabetes Medications:   -Levemir 30 units QD   -Novolog 20 units TIDWM in addition to the following correction scale:    150 - 200 + 2 unit    201 - 250 + 4 units    251 - 300 + 6 units    301 - 350 + 8 units       > 350   + 10 units     How often checking glucose at home?  > 4 times per day  BG readings on regimen: 180's-200's  Hypoglycemia on the regimen?  No  Missed doses on regimen?  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 He underwent RVAD removal and chest closure 6/30/2022.  He also completed a course of IV Abx for staph epi. He was weaned off  but he had to restarted due to RVF. He was eventually transitioned to milrinone (secondary to  shortage) now on 0.25 mcg/kg/min. He has a history of unclear syncope vs seizures and is followed by neuro for this and is on Keppra.   On 11/15/23 underwent removal of eroded [employer] scICD--no Lifevest (due to LVAD) and no plan to replace ICD as not requiring therapy post LVAD with concern for reinfection.  He has not had neurology f/u with seizure hx on keppra so coordinator to assist him with obtaining appt.  Patient presents for concerns for  fever, vomiting and pain and concerns of driveline infection, low INR and hyperglycemia. Beta hydroxybutyrate negative. Lactic acid negative. Normal WBC count and CT Abd/Pelv with contrast without signs of driveline infection. Had elevated blood glucose 2024 and was advised to go to ED. Also advised to go to ED in the first week of April for fever of 102*F (reported) but did not go at that time. Patient reports that he has been taking lower doses of Lasix at home as compared to what has been prescribed. Exam consistent with hypervolemia. Bedside TTE with IVC 2.5 cm and non collapsible IVC. No inflow outflow cannula issues visualized. Endocrine consulted to manage hyperglycemia and type 2 diabetes.     Lab Results   Component Value Date    HGBA1C >14.0 (H) 2024         Interval HPI:   Overnight events: No acute events overnight. Patient in room 315/315 A. Blood glucose worsening. BG above goal on current insulin regimen (IIP). Steroid use- None.    Renal function- Normal   Vasopressors-  None       Endocrine will continue to follow and manage insulin orders inpatient.         Diet Clear liquid (no sugar)/Bariatric Fluid - 2000mL     Eatin%  Nausea: No  Hypoglycemia and intervention: No  Fever: No  TPN and/or TF: No    PMH, PSH, FH, SH updated and reviewed     ROS:  Review of Systems   Constitutional:  Negative for unexpected weight change.   Eyes:  Negative for visual disturbance.   Respiratory:  Negative for cough.    Cardiovascular:  Negative for chest pain.   Gastrointestinal:  Negative for nausea and vomiting.   Endocrine: Negative for polydipsia and polyuria.   Musculoskeletal:  Negative for back pain.   Skin:  Negative for rash.   Neurological:  Negative for syncope.   Psychiatric/Behavioral:  Negative for agitation and dysphoric mood.        Current Medications and/or Treatments Impacting Glycemic Control  Immunotherapy:    Immunosuppressants       None          Steroids:   Hormones (From  "admission, onward)      None          Pressors:    Autonomic Drugs (From admission, onward)      None          Hyperglycemia/Diabetes Medications:   Antihyperglycemics (From admission, onward)      Start     Stop Route Frequency Ordered    04/20/24 0415  insulin regular in 0.9 % NaCl 100 unit/100 mL (1 unit/mL) infusion        Question:  Enter initial dose from Infusion Protocol Chart (Units/hr):  Answer:  8    -- IV Continuous 04/20/24 0304             PHYSICAL EXAMINATION:  Vitals:    04/20/24 0816   BP: (!) 92/0   Pulse: 100   Resp: 18   Temp: 98 °F (36.7 °C)     Body mass index is 22.4 kg/m².     Physical Exam  Constitutional:       Appearance: He is well-developed.   HENT:      Head: Normocephalic.   Eyes:      Conjunctiva/sclera: Conjunctivae normal.   Pulmonary:      Effort: Pulmonary effort is normal.   Musculoskeletal:         General: Normal range of motion.   Skin:     General: Skin is warm.      Findings: No rash.   Neurological:      Mental Status: He is alert and oriented to person, place, and time.            Labs Reviewed and Include   Recent Labs   Lab 04/20/24  0209 04/20/24  0600   * 680*   CALCIUM 9.0 8.8   ALBUMIN 3.1*  --    PROT 8.0  --    * 123*   K 4.3 3.9   CO2 18* 20*   CL 95 92*   BUN 11 12   CREATININE 1.4 1.3   ALKPHOS 183*  --    ALT 18  --    AST 32  --    BILITOT 3.0*  --      Lab Results   Component Value Date    WBC 6.23 04/20/2024    HGB 8.3 (L) 04/20/2024    HCT 36 04/20/2024    MCV 61 (L) 04/20/2024     04/20/2024     No results for input(s): "TSH", "FREET4" in the last 168 hours.  Lab Results   Component Value Date    HGBA1C >14.0 (H) 02/17/2024       Nutritional status:   Body mass index is 22.4 kg/m².  Lab Results   Component Value Date    ALBUMIN 3.1 (L) 04/20/2024    ALBUMIN 3.0 (L) 02/17/2024    ALBUMIN 3.2 (L) 02/04/2024     Lab Results   Component Value Date    PREALBUMIN 14 (L) 01/03/2024    PREALBUMIN 21 11/15/2023    PREALBUMIN 17 (L) 11/13/2023 "       Estimated Creatinine Clearance: 87.7 mL/min (based on SCr of 1.3 mg/dL).    Accu-Checks  Recent Labs     04/20/24  0119 04/20/24  0405 04/20/24  0547 04/20/24  0644 04/20/24  0804 04/20/24  0926 04/20/24  1033 04/20/24  1147   POCTGLUCOSE >500* >500* >500* >500* >500* >500* >500* 490*        ASSESSMENT and PLAN    Cardiac/Vascular  * Acute decompensated heart failure  Managed per primary team  Avoid hypoglycemia        LVAD (left ventricular assist device) present  Managed per primary team  Avoid hypoglycemia        Endocrine  Type 2 diabetes mellitus with hyperglycemia  Endocrinology consulted for BG management.   BG goal 140-180        - IIP  - Requires intensive BG monitoring.   - BG checks q1hr  - Hypoglycemia protocol in place    - Notify endocrine if patient becomes hypokalemic (K < 3.3) and/or is not responding to replacement.   - Notify endocrine if patient requiring > 20 units/hr.      ** Please notify Endocrine for any change and/or advance in diet**  ** Please call Endocrine for any BG related issues **    Discharge Planning:   TBD. Please notify endocrinology prior to discharge.            Plan discussed with patient, family, and RN at bedside.        Michael Wyman, DNP, FNP  Endocrinology  [facility] - [facility] Stepdown

## 2024-04-20 NOTE — NURSING
Orders in place for Q 12 CVP  and SVO2. RN attempted twice, pt  refusing.Pt experiencing muscle  cramps on BUE, pain rated 10/10, unable to lie flat.Refused PICC team attempt for a central line due  to same reason. Provider DEYA Cope  notified. Ok to hold off on the order now.

## 2024-04-20 NOTE — ASSESSMENT & PLAN NOTE
Significantly low iron < 10 in 06/2023 with elevated TIBC, could be iron deficiency anemia   With MCV in 60s could also consider a mild thalassemia on differential

## 2024-04-20 NOTE — PROGRESS NOTES
"LVAD dressing change completed using sterile technique with daily kit DLES is a "1" with minimal dried  drainage noted on the drain sponge. Tolerated without any complication. No redness, or tenderness noted. Pictures added below.          04/20/24 1816        VAD 06/29/22 1115 Left ventricular assist device HeartMate 3   Placement Date/Time: 06/29/22 1115   Present Prior to Hospital Arrival?: No  Inserted by: MD  VAD Type: Left ventricular assist device  VAD Brand: HeartMate 3   Site Location Abdomen right   Site Assessment Clean;Dry;Intact   Driveline Exit Site 1   Driveline Assessment Free of Kinks;Intact   Dressing Status Clean;Dry;Intact   Dressing Intervention Sterile dressing change   Performed By RN   Dressing Change Schedule Daily   Dressing Change Due 04/21/24   Integrity dry;intact   Driveline [place] in use Tape   Condition CDI   Post Removal Complications None       "

## 2024-04-20 NOTE — PLAN OF CARE
Pt arrived to floor w LVAD equipment & emergency bag. VC notified of admission. BP elevated, HTS aware. Lasix gtt d/c'ed, carrier fluid/NS rate changed to 50 per order. Milrinone gtt per order. Pt has no complaints but some cramps   Insulin gtt continued, BG monitored Q1hr but reading >500 without specific number. Per HTS, will use Q4hr BMP BG result to titrate. BMP drawn by lab & not resolved yet. Day RN aware   PICC line partially out, HTS notified & said ok to use for now. Pt wife stated VC & LVAD clinic aware. Site has no drainage/redness/swelling, pt denies tenderness       Nurses Note -- 4 Eyes      4/20/2024   7:58 AM      Skin assessed during: Transfer      [x] No Altered Skin Integrity Present    [x]Prevention Measures Documented      [] Yes- Altered Skin Integrity Present or Discovered   [] LDA Added if Not in Epic (Describe Wound)   [] New Altered Skin Integrity was Present on Admit and Documented in LDA   [] Wound Image Taken    Wound Care Consulted? No    Attending Nurse:  Ana Reilly RN     Second RN/Staff Member:  RIAN Harvey    Problem: Infection  Goal: Absence of Infection Signs and Symptoms  Outcome: Ongoing, Progressing     Problem: Diabetes Comorbidity  Goal: Blood Glucose Level Within Targeted Range  Outcome: Ongoing, Progressing     Problem: Adjustment to Device (Ventricular Assist Device)  Goal: Optimal Adjustment to Device  Outcome: Ongoing, Progressing

## 2024-04-20 NOTE — ASSESSMENT & PLAN NOTE
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 Infected [employer] scICD that was extracted 11/2023, no LifeVest due to VAD present and no hx of VT shocks. Presents with reported fever and nausea, blood glucose uncontrolled on admission.   Blood glucose 592 mg/dL on ISTAT, anion gap 13, with blood glucose on serum 602 mg/dL  UA on admit does not show signs of infection  Hx of A1c 14%  Most recent A1c 04/02/2024 7.9%    Admit to [facility] for observation, given abx in ED  Blood cultures x 2 in ED drawn  Insulin while inpatient to control hyperglycemia  Gentle hydration due to concerns of intravascular volume depletion, bedside TTE on admit with low CVP

## 2024-04-20 NOTE — CONSULTS
"  [facility] - Cardiology Stepdown  Adult Nutrition  Consult Note    SUMMARY     Recommendations    ADAT to Low Na, Diabetic w/ fluid restriction per MD.  RD to monitor & follow-up.    Goals: Meet % EEN, EPN by RD f/u date  Nutrition Goal Status: new  Communication of RD Recs: reviewed with RN    Assessment and Plan    Nutrition Problem:  Excessive CHO intake    Related to (etiology):   Food and nutrition related knowledge deficit     Signs and Symptoms (as evidenced by):   A1C >14    Interventions(treatment strategy):  Collaboration of nutrition care w/ other providers    Nutrition Diagnosis Status:   New    Reason for Assessment    Reason For Assessment: consult  Diagnosis: other (see comments) (HF)  Relevant Medical History: CHF, DM, LVAD (6/2022), Polysubstance abuse  Interdisciplinary Rounds: did not attend    General Information Comments: Pt tolerating diet, consuming breakfast during visit. Reports good appetite PTA & UBW of 180#. Pt appears thin, however no indicators of malnutrition found. Noted A1C >14 (2/2024) - diabetic diet education reinforced.  Nutrition Discharge Planning: Adequate PO intake    Nutrition/Diet History    Patient Reported Diet/Restrictions/Preferences: general  Factors Affecting Nutritional Intake: None identified at this time    Anthropometrics    Temp: 98 °F (36.7 °C)  Height Method: Stated  Height: 6' 3" (190.5 cm)  Height (inches): 75 in  Weight Method: Standard Scale  Weight: 81.3 kg (179 lb 3.7 oz)  Weight (lb): 179.24 lb  Ideal Body Weight (IBW), Male: 196 lb  % Ideal Body Weight, Male (lb): 91.45 %  BMI (Calculated): 22.4  BMI Grade: 18.5-24.9 - normal    Lab/Procedures/Meds    Pertinent Labs Reviewed: reviewed  Pertinent Labs Comments: Na 126, A1C >14 (2/2024)  Pertinent Medications Reviewed: reviewed  Pertinent Medications Comments: Insulin, Coumadin    Estimated/Assessed Needs    Weight Used For Calorie Calculations: 81.3 kg (179 lb 3.7 oz)    Energy Calorie Requirements " (kcal): 2176 kcal/d  Energy Need Method: [place]-St Jeor (1.2 PAL)    Protein Requirements: 81 g/d (1 g/kg)  Weight Used For Protein Calculations: 81.3 kg (179 lb 3.7 oz)    Estimated Fluid Requirement Method: other (see comments) (Per MD)  RDA Method (mL): 2176    Nutrition Prescription Ordered    Current Diet Order: Clear liquid/Bariatric   Nutrition Order Comments: 2000 mL FR    Evaluation of Received Nutrient/Fluid Intake    I/O: -4.1L since admit    Comments: LBM: 4/19    Tolerance: tolerating    Nutrition Risk    Level of Risk/Frequency of Follow-up:  (1x/week)     Monitor and Evaluation    Food and Nutrient Intake: food and beverage intake, energy intake  Food and Nutrient Adminstration: diet order  Physical Activity and Function: nutrition-related ADLs and IADLs  Anthropometric Measurements: weight, weight change  Biochemical Data, Medical Tests and Procedures: lipid profile, inflammatory profile, electrolyte and renal panel, gastrointestinal profile, glucose/endocrine profile  Nutrition-Focused Physical Findings: overall appearance     Nutrition Follow-Up    RD Follow-up?: Yes

## 2024-04-20 NOTE — ED PROVIDER NOTES
Source of History:  Patient  Chart    Chief complaint:  LVAD problem ( line leaking for weeks. Pain at driverline site. No alarms. ) and Hyperglycemia (>500)      HPI:  Kevan Queen is a 39 y.o. male with history of LVAD (HM3 2022) c/b prior MSSA DLESI, DLESI and MSSA bacteremia c/b L empyema (maintained on ancef, maria isabel 9/14), seizure disorder, type 2 diabetes, substance abuse, presenting to emergency department with complaint of drainage from around LVAD site, and hyperglycemia.    Per chart review, multiple telephone calls the patient with concern for subtherapeutic INR, and concern for driveline site infection, urged to come to the emergency department but patient refusing.    Patient states he has been having intermittent drainage onto his LVAD dressing for approximately 1 month.  He denies fevers or chills.  No bleeding symptoms.  No that alarms.  He was no abdominal pain.  No vomiting.  He notes high blood sugars.  States that he has been eating too much salty food, and is having some lower extremity edema.    Review of patient's allergies indicates:   Allergen Reactions    Bumex [bumetanide] Hives    Torsemide Hives       No current facility-administered medications on file prior to encounter.     Current Outpatient Medications on File Prior to Encounter   Medication Sig Dispense Refill    acetaminophen (TYLENOL) 325 MG tablet Take 2 tablets (650 mg total) by mouth every 6 (six) hours as needed for Pain.  0    aspirin (ECOTRIN) 81 MG EC tablet Take 1 tablet (81 mg total) by mouth once daily. 90 tablet 3    atorvastatin (LIPITOR) 40 MG tablet Take 1 tablet (40 mg total) by mouth once daily. 90 tablet 3    blood sugar diagnostic Strp Use to test blood glucose 4 (four) times daily. 200 each 5    blood-glucose meter (TRUE METRIX GLUCOSE METER) Misc Use to test blood glucose 4 (four) times daily. 1 each 0    blood-glucose meter Misc Use as instructed 1 each 0    cefadroxil (DURICEF) 500 MG Cap Take  "1 capsule (500 mg total) by mouth every 12 (twelve) hours. 60 capsule 11    furosemide (LASIX) 80 MG tablet Take 1 tablet (80 mg total) by mouth once daily. 30 tablet 11    insulin aspart U-100 (NOVOLOG) 100 unit/mL (3 mL) InPn pen Inject 10 Units into the skin 3 (three) times daily with meals. Plus Sliding Scale: 151-200: +1, 201-250: +2, 251-300: +3, 301-350: +4 and call MD.  (Max daily dose:  60 units) 12 mL 5    insulin detemir U-100, Levemir, 100 unit/mL (3 mL) SubQ InPn pen Inject 30 Units into the skin once daily. 6 mL 5    lancets 33 gauge Misc Use to test blood glucose 4 (four) times daily. 200 each 3    levETIRAcetam (KEPPRA) 1000 MG tablet Take 1.5 tablets (1,500 mg total) by mouth 2 (two) times daily. 60 tablet 11    magnesium oxide (MAG-OX) 400 mg (241.3 mg magnesium) tablet Take 1 tablet (400 mg total) by mouth once daily. 30 tablet 11    milrinone 20mg/100ml D5W, 200mcg/ml, (PRIMACOR) 20 mg/100 mL (200 mcg/mL) infusion Inject 23.6 mcg/min into the vein continuous.      mirtazapine (REMERON) 15 MG tablet Take 1 tablet (15 mg total) by mouth nightly. 30 tablet 11    ondansetron (ZOFRAN-ODT) 4 MG TbDL Take 1 tablet (4 mg total) by mouth every 8 (eight) hours as needed (nausea). 10 tablet 0    pantoprazole (PROTONIX) 40 MG tablet Take 1 tablet (40 mg total) by mouth once daily. 30 tablet 11    pen needle, diabetic 32 gauge x 5/32" Ndle Use to inject insulin 5 (five) times daily. 200 each 5    polyethylene glycol (GLYCOLAX) 17 gram PwPk Take 17 g by mouth once daily. 30 each 1    potassium chloride SA (K-DUR,KLOR-CON) 20 MEQ tablet Take 20 mEq by mouth 3 (three) times daily. Take 2 tablets by mouth three times daily      sodium chloride 0.9% SolP 50 mL with DAPTOmycin 500 mg SolR 688 mg Inject 688 mg into the vein once daily.      spironolactone (ALDACTONE) 25 MG tablet Take 1 tablet (25 mg total) by mouth once daily. 30 tablet 11    warfarin (COUMADIN) 3 MG tablet Take 1.5 tablets (4.5mg) orally daily, " except 2 tablets (6mg) on Wednesdays and Saturdays 60 tablet 5    [DISCONTINUED] digoxin (LANOXIN) 125 mcg tablet Take 1 tablet (0.125 mg total) by mouth once daily. 30 tablet 11    [DISCONTINUED] EScitalopram oxalate (LEXAPRO) 5 MG Tab Take 1 tablet (5 mg total) by mouth once daily. 90 tablet 3    [DISCONTINUED] insulin (LANTUS SOLOSTAR U-100 INSULIN) glargine 100 units/mL (3mL) SubQ pen Inject 10 Units into the skin every evening. (Patient not taking: Reported on 5/24/2022) 3 mL 11    [DISCONTINUED] metOLazone (ZAROXOLYN) 2.5 MG tablet Take 2.5 mg by mouth every other day.      [DISCONTINUED] metoprolol succinate (TOPROL-XL) 25 MG 24 hr tablet TAKE 1 TABLET(25 MG) BY MOUTH EVERY DAY 30 tablet 0       PMH:  As per HPI and below:  Past Medical History:   Diagnosis Date    Acute respiratory failure with hypoxia 07/22/2023    Arthritis     Awareness alteration, transient 09/01/2022    Cardiomyopathy     CHF (congestive heart failure) 10/01/2020    COVID-19 06/03/2023    Diabetes mellitus     Dilated cardiomyopathy 10/26/2020    Drug abuse 10/2020    Headache 04/19/2023    Hyperglycemia 12/16/2022    Hyperosmolar hyperglycemic state (HHS) 05/25/2022    ICD (implantable cardioverter-defibrillator) in place 10/26/2020    Infected defibrillator now s/p removal Nov 2023 07/23/2023    Left ventricular assist device (LVAD) complication, initial encounter 06/05/2023    Muscle cramping 06/15/2022    Renal disorder     SOB (shortness of breath) 06/13/2022    Tingling in extremities 07/13/2022     Past Surgical History:   Procedure Laterality Date    APPLICATION OF WOUND VACUUM-ASSISTED CLOSURE DEVICE N/A 6/30/2022    Procedure: APPLICATION, WOUND VAC;  Surgeon: Luis F Paige MD;  Location: [facility] OR [address] [place] [address];  Service: Cardiovascular;  Laterality: N/A;  50 x 5 cm    CARDIAC DEFIBRILLATOR PLACEMENT      ECHOCARDIOGRAM,TRANSESOPHAGEAL  11/9/2023    Procedure: Transesophageal echo (GUILLERMO) intra-procedure log documentation;  Surgeon:  Eron Ivy MD;  Location: [facility] EP LAB;  Service: Cardiology;;    EXTRACTION, ELECTRODE LEAD Left 11/9/2023    Procedure: EXTRACTION, ELECTRODE LEAD;  Surgeon: ADALID Leon MD;  Location: [facility] EP LAB;  Service: Cardiology;  Laterality: Left;  INFECTION, LEAD EXTRACTION, GUILLERMO, BSCI, ANES, EH,   *NO CTS BACKUP*    IMPLANTATION OF RIGHT VENTRICULAR ASSIST DEVICE (RVAD) N/A 6/29/2022    Procedure: INSERTION, RVAD;  Surgeon: Luis F Paige MD;  Location: [facility] OR [facility] FLR;  Service: Cardiovascular;  Laterality: N/A;    INSERTION OF GRAFT TO PERICARDIUM Right 6/30/2022    Procedure: INSERTION-RIGHT VENTRICULAR ASSIST DEVICE;  Surgeon: Luis F Paige MD;  [facility];  Service: Cardiovascular;  Laterality: Right;    IRRIGATION OF MEDIASTINUM  6/30/2022    Procedure: IRRIGATION, MEDIASTINUM;  Surgeon: Luis F Paige MD;  Location: [facility] OR [facility] FLR;  Service: Cardiovascular;;    LEFT VENTRICULAR ASSIST DEVICE Left 6/23/2022    Procedure: INSERTION-LEFT VENTRICULAR ASSIST DEVICE;  Surgeon: Luis F Piage MD;  Location: [facility] OR [facility] FLR;  Service: Cardiovascular;  Laterality: Left;    LEFT VENTRICULAR ASSIST DEVICE N/A 6/29/2022    Procedure: INSERTION-LEFT VENTRICULAR ASSIST DEVICE;  Surgeon: Luis F Paige MD;  Location: [facility] OR 2ND FLR;  Service: Cardiovascular;  Laterality: N/A;    REVISION OF SKIN POCKET FOR CARDIOVERTER-DEFIBRILLATOR  11/9/2023    Procedure: REVISION, SKIN POCKET, FOR CARDIOVERTER-DEFIBRILLATOR;  Surgeon: ADALID Leon MD;  Location: [facility] EP LAB;  Service: Cardiology;;    RIGHT HEART CATHETERIZATION Right 4/8/2022    Procedure: INSERTION, CATHETER, RIGHT HEART;  Surgeon: Luca Lopez Jr., MD;  Location: [facility] CATH LAB;  Service: Cardiology;  Laterality: Right;    RIGHT HEART CATHETERIZATION Right 4/19/2022    Procedure: INSERTION, CATHETER, RIGHT HEART;  Surgeon: Josh Pulido MD;  Location: [facility] CATH LAB;  Service: Cardiology;  Laterality: Right;    RIGHT  HEART CATHETERIZATION Right 2022    Procedure: INSERTION, CATHETER, RIGHT HEART;  Surgeon: Dalia Crum MD;  Location: [facility] CATH LAB;  Service: Cardiology;  Laterality: Right;    RIGHT HEART CATHETERIZATION Right 10/31/2022    Procedure: INSERTION, CATHETER, RIGHT HEART;  Surgeon: Dalia Curm MD;  Location: [facility] CATH LAB;  Service: Cardiology;  Laterality: Right;    STERNAL WOUND CLOSURE N/A 2022    Procedure: CLOSURE, WOUND, STERNUM;  Surgeon: Luis F Paige MD;  Location: [facility] OR [facility] FLR;  Service: Cardiovascular;  Laterality: N/A;       Social History     Socioeconomic History    Marital status: Significant Other   Tobacco Use    Smoking status: Former     Current packs/day: 0.00     Average packs/day: 0.5 packs/day for 16.6 years (8.3 ttl pk-yrs)     Types: Cigarettes     Start date: 10/1/2004     Quit date: 2021     Years since quittin.9    Smokeless tobacco: Never   Substance and Sexual Activity    Alcohol use: Not Currently    Drug use: Not Currently     Types: Marijuana, MDMA (Ecstacy)    Sexual activity: Yes     Partners: Female     Birth control/protection: None     Social Determinants of Health     Financial Resource Strain: Low Risk  (3/21/2024)    Overall Financial Resource Strain (CARDIA)     Difficulty of Paying Living Expenses: Not hard at all   Food Insecurity: No Food Insecurity (3/21/2024)    Hunger Vital Sign     Worried About Running Out of Food in the Last Year: Never true     Ran Out of Food in the Last Year: Never true   Transportation Needs: Unmet Transportation Needs (3/21/2024)    PRAPARE - Transportation     Lack of Transportation (Medical): Yes     Lack of Transportation (Non-Medical): Yes   Physical Activity: Unknown (3/21/2024)    Exercise Vital Sign     Days of Exercise per Week: Patient declined     Minutes of Exercise per Session: 0 min   Stress: No Stress Concern Present (3/21/2024)    [employer] [place] [employer] - Occupational Stress  Questionnaire     Feeling of Stress : Not at all   Social Connections: Unknown (3/21/2024)    Social Connection and Isolation Panel [NHANES]     Frequency of Communication with Friends and Family: Never     Frequency of Social Gatherings with Friends and Family: Never     Active Member of Clubs or Organizations: No     Attends Club or Organization Meetings: Never     Marital Status: Living with partner   Housing Stability: Low Risk  (3/21/2024)    Housing Stability Vital Sign     Unable to Pay for Housing in the Last Year: No     Number of Places Lived in the Last Year: 2     Unstable Housing in the Last Year: No       Family History   Problem Relation Name Age of Onset    Heart failure Father      Diverticulosis Brother      Heart attack Maternal Grandmother      Heart attack Maternal Grandfather         Physical Exam:      Vitals:    04/21/24 0000   BP: (!) 86/0   Pulse:    Resp:    Temp:      Gen: No acute distress.  Nontoxic.  Chronically ill-appearing  Mental Status:  Alert and oriented .  Appropriate, conversant.  Skin: Warm, dry. No rashes seen.  No spontaneous drainage from bad site, small amount of dried drainage present on his dressing  Eyes: No conjunctival injection.  Pulm: CTAB. No increased work of breathing.  No significant tachypnea.  No audible stridor or wheezing.  No conversational dyspnea.    CV:  LVAD hum present.  Lower extremity edema present.  Abd: Soft.  Not distended.  Nontender.   MSK: Good range of motion all joints.  No deformities.    Neuro: Awake. Speech normal. No focal neuro deficit observed.      Laboratory Studies:  Labs Reviewed   CBC W/ AUTO DIFFERENTIAL - Abnormal; Notable for the following components:       Result Value    Hemoglobin 8.3 (*)     Hematocrit 29.5 (*)     MCV 61 (*)     MCH 17.0 (*)     MCHC 28.1 (*)     RDW 25.2 (*)     nRBC 2 (*)     All other components within normal limits   COMPREHENSIVE METABOLIC PANEL - Abnormal; Notable for the following components:     Sodium 126 (*)     CO2 18 (*)     Glucose 602 (*)     Albumin 3.1 (*)     Total Bilirubin 3.0 (*)     Alkaline Phosphatase 183 (*)     All other components within normal limits    Narrative:     _ acknowledged and accepted results on test(s) _ via secure chat.                                     lana dejesus rn by WPT 04/20/2024 03:23   URINALYSIS, REFLEX TO URINE CULTURE - Abnormal; Notable for the following components:    Specific Gravity, UA >1.030 (*)     Protein, UA 2+ (*)     Glucose, UA 4+ (*)     Ketones, UA Trace (*)     Occult Blood UA 1+ (*)     All other components within normal limits    Narrative:     Specimen Source->Urine   APTT - Abnormal; Notable for the following components:    aPTT 32.5 (*)     All other components within normal limits   PROTIME-INR - Abnormal; Notable for the following components:    Prothrombin Time 20.3 (*)     INR 1.9 (*)     All other components within normal limits   PROCALCITONIN - Abnormal; Notable for the following components:    Procalcitonin 0.44 (*)     All other components within normal limits    Narrative:     Add on MG and Phos per Dr. Todd @ 03:11 am to order # [id number]   LACTATE DEHYDROGENASE - Abnormal; Notable for the following components:     (*)     All other components within normal limits   BETA - HYDROXYBUTYRATE, SERUM - Abnormal; Notable for the following components:    Beta-Hydroxybutyrate 1.2 (*)     All other components within normal limits   OSMOLALITY, SERUM - Abnormal; Notable for the following components:    Osmolality 301 (*)     All other components within normal limits   PHOSPHORUS - Abnormal; Notable for the following components:    Phosphorus 2.2 (*)     All other components within normal limits    Narrative:     Add on MG and Phos per Dr. Todd @ 03:11 am to order # [id number]   POCT GLUCOSE - Abnormal; Notable for the following components:    POCT Glucose >500 (*)     All other components within normal limits   ISTAT  PROCEDURE - Abnormal; Notable for the following components:    POC PCO2 33.7 (*)     POC HCO3 19.8 (*)     POC BE -5 (*)     POC TCO2 21 (*)     All other components within normal limits   ISTAT PROCEDURE - Abnormal; Notable for the following components:    POC Glucose 592 (*)     POC Sodium 127 (*)     POC TCO2 (MEASURED) 19 (*)     All other components within normal limits   URINALYSIS MICROSCOPIC    Narrative:     Specimen Source->Urine   BASIC METABOLIC PANEL   MAGNESIUM   PHOSPHORUS   MAGNESIUM    Narrative:     Add on MG and Phos per Dr. Todd @ 03:11 am to order # [id number]   BASIC METABOLIC PANEL   ISTAT LACTATE   ISTAT CHEM8       EKG (independently interpreted by me):  Sinus rhythm.  No arrhythmia.  Artifact present due to presence of bad    Chart reviewed.     Imaging Results              CT Chest Abdomen Pelvis With IV Contrast (XPD) NO Oral Contrast (Final result)  Result time 04/20/24 06:00:55      Final result by Ross Marks MD (04/20/24 06:00:55)                   Impression:      No acute findings in the chest, abdomen, or pelvis.  Specifically no abnormalities along the course of the drive line.    Enlarged heterogeneous liver, similar to prior and possibly related to congestive hepatopathy (as there is continued engorgement of the IVC and hepatic veins).    Nonobstructing 3 mm left lower pole renal calculus.  No right or left hydronephrosis.    Electronically signed by resident: Julio Sanchez  Date:    04/20/2024  Time:    04:29    Electronically signed by: Ross Marks  Date:    04/20/2024  Time:    06:00               Narrative:    EXAMINATION:  CT CHEST ABDOMEN PELVIS WITH IV CONTRAST (XPD)    CLINICAL HISTORY:  Sepsis;Concern for LVAD driveline infection;    TECHNIQUE:  Axial images of the chest, abdomen, and pelvis were acquired  after the use of 75 mL [id number] IV contrast.  Coronal and sagittal reconstructions were also obtained.    COMPARISON:  CT chest abdomen pelvis  01/23/2024    FINDINGS:  Thoracic soft tissues: Normal thyroid. No axillary lymphadenopathy.    Aorta: Thoracic aorta is normal in caliber and contour with no significant calcific atherosclerosis. Left vertebral artery arises directly from the aortic arch.    Heart: Enlarged.  No significant calcific coronary atherosclerosis.  LVAD in place.  Outflow cannula appears patent.    [place]/Mediastinum: No mediastinal or hilar lymphadenopathy.    Lungs: Trachea and bronchi are patent.  Lungs symmetrically expanded without consolidation.  Few scattered pulmonary micronodules and calcified granulomas stable from prior.  No pleural fluid or pneumothorax.    Liver: Enlarged measuring 22.0 cm in craniocaudal dimension.  Diffusely heterogeneous.  No focal hepatic lesion.  Portal veins are patent.    Gallbladder: Contracted with pericholecystic fluid.    Bile Ducts: No evidence of dilated ducts.    Pancreas: No mass or peripancreatic fat stranding.    Spleen: Unremarkable.    Esophagus: Unremarkable.    Stomach and duodenum: Unremarkable.    Adrenals: Unremarkable.    Kidneys/Ureters: Right kidney inferiorly displaced by enlarged liver.  Normal enhancement. 3 mm left lower pole renal stone, nonobstructing.  No right renal stones.  No hydronephrosis or ureteral dilatation.    Bladder: No evidence of wall thickening.    Reproductive organs: Unremarkable.    Bowel/Mesentery: Small bowel is normal in caliber with no evidence of obstruction.  No evidence of inflammation or wall thickening.  Normal appendix.  Colon demonstrates no focal wall thickening.  Diffuse mesenteric edema.    Peritoneum: No intraperitoneal free air or fluid.    Lymph nodes: No lymphadenopathy.    Abdominal wall:  Diffuse anasarca.  No abnormalities along the course of the drive line.  No focal fluid collections    Vasculature: No aneurysm. Minimal calcific atherosclerosis.    Bones: Postoperative change median sternotomy.  No acute fracture. No suspicious osseous  lesions.                                       X-Ray Chest AP Portable (Final result)  Result time 04/20/24 03:25:53      Final result by Sofia Meléndez MD (04/20/24 03:25:53)                   Impression:      Please see above.      Electronically signed by: Sofia Meléndez MD  Date:    04/20/2024  Time:    03:25               Narrative:    EXAMINATION:  XR CHEST AP PORTABLE    CLINICAL HISTORY:  Sepsis;    TECHNIQUE:  Single frontal view of the chest was performed.    COMPARISON:  02/17/2024    FINDINGS:  Cardiac monitoring leads overlie the chest.  There is postoperative change of median sternotomy.  Note is made that the most inferior sternal suture is fractured, unchanged from several prior examinations.  There is a stably positioned LVAD in place.  There is unchanged enlargement of the cardiomediastinal silhouette.  The lungs are symmetrically expanded without evidence of confluent airspace consolidation, substantial volume of pleural fluid or pneumothorax.  Osseous structures are intact.                                      Medications Given:  Medications   sodium chloride 0.9% flush 10 mL (has no administration in time range)   dextrose 5 % and 0.45 % NaCl infusion (has no administration in time range)   dextrose 10% bolus 125 mL 125 mL (has no administration in time range)   dextrose 10% bolus 250 mL 250 mL (has no administration in time range)   potassium chloride SA CR tablet 40 mEq (40 mEq Oral Given 4/20/24 0930)   0.9%  NaCl infusion (0 mLs Intravenous Stopped 4/20/24 1300)   aspirin EC tablet 81 mg (81 mg Oral Given 4/20/24 0930)   atorvastatin tablet 40 mg (40 mg Oral Given 4/20/24 0930)   acetaminophen tablet 650 mg (has no administration in time range)   levETIRAcetam tablet 1,500 mg (1,500 mg Oral Given 4/20/24 2114)   magnesium oxide tablet 400 mg (400 mg Oral Given 4/20/24 0930)   milrinone 20mg in D5W 100 mL infusion (0.25 mcg/kg/min × 94.4 kg Intravenous New Bag 4/20/24 5233)   mirtazapine  tablet 15 mg (15 mg Oral Not Given 4/20/24 2100)   ondansetron disintegrating tablet 4 mg (has no administration in time range)   pantoprazole EC tablet 40 mg (40 mg Oral Given 4/20/24 0930)   warfarin (COUMADIN) tablet 5 mg (5 mg Oral Given 4/20/24 1656)   spironolactone tablet 25 mg (25 mg Oral Given 4/20/24 0930)   gabapentin capsule 300 mg (300 mg Oral Given 4/20/24 0614)   furosemide tablet 40 mg (has no administration in time range)   cyclobenzaprine tablet 5 mg (5 mg Oral Given 4/20/24 1752)   sodium chloride 0.9% flush 10 mL (10 mLs Intravenous Given 4/21/24 0000)     And   sodium chloride 0.9% flush 10 mL (has no administration in time range)   amLODIPine tablet 5 mg (5 mg Oral Given 4/20/24 1656)   insulin regular in 0.9 % NaCl 100 unit/100 mL (1 unit/mL) infusion (2.5 Units/hr Intravenous Restarted 4/20/24 2100)   glucose chewable tablet 16 g (has no administration in time range)   glucose chewable tablet 24 g (has no administration in time range)   glucagon (human recombinant) injection 1 mg (has no administration in time range)   insulin aspart U-100 pen 0-10 Units (2 Units Subcutaneous Given 4/20/24 2346)   mupirocin 2 % ointment ( Nasal Given 4/20/24 2119)   vancomycin 1.75 g in 5 % dextrose 500 mL IVPB (0 mg Intravenous Stopped 4/20/24 0608)   iohexoL (OMNIPAQUE 350) injection 75 mL (75 mLs Intravenous Given 4/20/24 0348)   furosemide injection 80 mg (80 mg Intravenous Given 4/20/24 0434)   methocarbamoL tablet 1,000 mg (1,000 mg Oral Given 4/20/24 0930)   magnesium sulfate 2g in water 50mL IVPB (premix) (0 g Intravenous Stopped 4/20/24 1415)       Discussed with:  Cardiology    MDM:    39 y.o. male with LVAD with prior driveline site infections, poorly controlled type 2 diabetes presenting to emergency department with complaint of drainage around his driveline site, and hyperglycemia.  He has appropriate mentation, no VAD alarms.    Labs reviewed.  Started on insulin drip.  Case discussed with  Cardiology, anticipate admission to their service.    Diagnostic Impression:    1. Acute decompensated heart failure    2. LVAD (left ventricular assist device) present    3. Type 2 diabetes mellitus with hyperosmolar hyperglycemic state (HHS)    4. Type 2 diabetes mellitus with hyperglycemia, with long-term current use of insulin         ED Disposition Condition    Admit Stable               Patient understands the plan and is in agreement, verbalized understanding, questions answered    Lola Todd MD  Emergency Medicine         Lola Todd MD  04/21/24 0118

## 2024-04-20 NOTE — ASSESSMENT & PLAN NOTE
Infected [employer] scICD that was extracted 11/2023  No LifeVest due to VAD present and no hx of VT shocks.

## 2024-04-20 NOTE — ASSESSMENT & PLAN NOTE
-Blood glucose 592 mg/dL on ISTAT, anion gap 13, with blood glucose on serum 602 mg/dL  -Endocrinology consult and patient on insulin drip  -management per endocrine

## 2024-04-20 NOTE — SUBJECTIVE & OBJECTIVE
Interval HPI:   Overnight events: No acute events overnight. Patient in room 315/315 A. Blood glucose worsening. BG above goal on current insulin regimen (IIP). Steroid use- None.    Renal function- Normal   Vasopressors-  None       Endocrine will continue to follow and manage insulin orders inpatient.         Diet Clear liquid (no sugar)/Bariatric Fluid - 2000mL     Eatin%  Nausea: No  Hypoglycemia and intervention: No  Fever: No  TPN and/or TF: No    PMH, PSH, FH, SH updated and reviewed     ROS:  Review of Systems   Constitutional:  Negative for unexpected weight change.   Eyes:  Negative for visual disturbance.   Respiratory:  Negative for cough.    Cardiovascular:  Negative for chest pain.   Gastrointestinal:  Negative for nausea and vomiting.   Endocrine: Negative for polydipsia and polyuria.   Musculoskeletal:  Negative for back pain.   Skin:  Negative for rash.   Neurological:  Negative for syncope.   Psychiatric/Behavioral:  Negative for agitation and dysphoric mood.        Current Medications and/or Treatments Impacting Glycemic Control  Immunotherapy:    Immunosuppressants       None          Steroids:   Hormones (From admission, onward)      None          Pressors:    Autonomic Drugs (From admission, onward)      None          Hyperglycemia/Diabetes Medications:   Antihyperglycemics (From admission, onward)      Start     Stop Route Frequency Ordered    24 0415  insulin regular in 0.9 % NaCl 100 unit/100 mL (1 unit/mL) infusion        Question:  Enter initial dose from Infusion Protocol Chart (Units/hr):  Answer:  8    -- IV Continuous 24 0304             PHYSICAL EXAMINATION:  Vitals:    24 0816   BP: (!) 92/0   Pulse: 100   Resp: 18   Temp: 98 °F (36.7 °C)     Body mass index is 22.4 kg/m².     Physical Exam  Constitutional:       Appearance: He is well-developed.   HENT:      Head: Normocephalic.   Eyes:      Conjunctiva/sclera: Conjunctivae normal.   Pulmonary:      Effort:  Pulmonary effort is normal.   Musculoskeletal:         General: Normal range of motion.   Skin:     General: Skin is warm.      Findings: No rash.   Neurological:      Mental Status: He is alert and oriented to person, place, and time.

## 2024-04-20 NOTE — PROCEDURES
"Kevan Queen is a 39 y.o. male patient.    Temp: 98.2 °F (36.8 °C) (04/20/24 1200)  Pulse: (!) 117 (04/20/24 1510)  Resp: 16 (04/20/24 1200)  BP: (!) 94/0 (04/20/24 1200)  SpO2: 98 % (04/20/24 1200)  Weight: 81.3 kg (179 lb 3.7 oz) (04/20/24 0947)  Height: 6' 3" (190.5 cm) (04/20/24 0947)    PICC  Date/Time: 4/20/2024 3:30 PM  Location procedure was performed: [facility] PICC LINE PLACEMENT  Performed by: Tara Yeung RN  Assisting provider: Agustín Messer LPN  Consent Done: Yes  Time out: Immediately prior to procedure a time out was called to verify the correct patient, procedure, equipment, support staff and site/side marked as required  Indications: med administration  Anesthesia: local infiltration  Local anesthetic: lidocaine 1% without epinephrine  Anesthetic Total (mL): 3  Preparation: skin prepped with ChloraPrep  Skin prep agent dried: skin prep agent completely dried prior to procedure  Sterile barriers: all five maximum sterile barriers used - cap, mask, sterile gown, sterile gloves, and large sterile sheet  Hand hygiene: hand hygiene performed prior to central venous catheter insertion  Location details: left brachial  Catheter type: double lumen  Catheter size: 5 Fr  Catheter Length: 41cm    Ultrasound guidance: yes  Vessel Caliber: medium and patent, compressibility normal  Vascular Doppler: not done  Needle advanced into vessel with real time Ultrasound guidance.  Guidewire confirmed in vessel.  Image recorded and saved.  Sterile sheath used.  no esophageal manometryNumber of attempts: 1  Post-procedure: blood return through all ports, sterile dressing applied and chlorhexidine patch  Technical procedures used: 3CG  Specimens: No  Implants: No  Assessment: placement verified by x-ray  Complications: none          Name Agustín Messer LPN   4/20/2024    "

## 2024-04-20 NOTE — PROGRESS NOTES
04/20/2024  Ruma Li    Current provider:  Natalya Villatoro MD    Device interrogation:      4/20/2024     8:10 AM 4/20/2024     5:34 AM 4/20/2024     3:13 AM 1/3/2024     2:33 PM 11/15/2023     3:26 PM 11/15/2023    11:10 AM 11/15/2023     7:34 AM   TXP LVAD INTERROGATIONS   Type HeartMate3 HeartMate3 HeartMate3  HeartMate3 HeartMate3 HeartMate3   Flow 4.2 4.2 4.4  4.2 4.1 4.2   Speed 5100 5100 5100  5100 5150 5150   PI 5.7 5.9 4.4  6.1 5.2 5.8   Power (Serna) 3.8 3.7 3.7  3.6 3.6 3.6   LSL 4700 4700     4750   Pulsatility  Intermittent pulse Intermittent pulse No Pulse   Intermittent pulse          Rounded on Kevan Queen to ensure all mechanical assist device settings (IABP or VAD) were appropriate and all parameters were within limits.  I was able to ensure all back up equipment was present, the staff had no issues, and the Perfusion Department daily rounding was complete.      For implantable VADs: Interrogation of Ventricular assist device was performed with analysis of device parameters and review of device function. I have personally reviewed the interrogation findings and agree with findings as stated.     In emergency, the nursing units have been notified to contact the perfusion department either by:  Calling [phone] from 630am to 4pm Mon thru Fri, utilizing the On-Call Finder functionality of Epic and searching for Perfusion, or by contacting the hospital  from 4pm to 630am and on weekends and asking to speak with the perfusionist on call.    10:05 AM

## 2024-04-20 NOTE — PROGRESS NOTES
For PICC line adjustment or replacement, we dont have any concerns for infection at this time.    Rebel Sanderson MD  Cardiology PGY6  [facility]

## 2024-04-21 PROBLEM — I50.9 ACUTE DECOMPENSATED HEART FAILURE: Status: ACTIVE | Noted: 2024-04-21

## 2024-04-21 PROBLEM — Z79.4 TYPE 2 DIABETES MELLITUS WITH HYPERGLYCEMIA, WITH LONG-TERM CURRENT USE OF INSULIN: Status: ACTIVE | Noted: 2024-04-21

## 2024-04-21 PROBLEM — E11.65 TYPE 2 DIABETES MELLITUS WITH HYPERGLYCEMIA: Status: RESOLVED | Noted: 2022-05-25 | Resolved: 2024-04-21

## 2024-04-21 PROBLEM — I10 HTN (HYPERTENSION): Status: ACTIVE | Noted: 2024-04-21

## 2024-04-21 PROBLEM — E11.65 TYPE 2 DIABETES MELLITUS WITH HYPERGLYCEMIA, WITH LONG-TERM CURRENT USE OF INSULIN: Status: ACTIVE | Noted: 2024-04-21

## 2024-04-21 LAB
ALBUMIN SERPL BCP-MCNC: 2.6 G/DL (ref 3.5–5.2)
ALBUMIN SERPL BCP-MCNC: 2.7 G/DL (ref 3.5–5.2)
ALLENS TEST: ABNORMAL
ALP SERPL-CCNC: 167 U/L (ref 55–135)
ALP SERPL-CCNC: 167 U/L (ref 55–135)
ALT SERPL W/O P-5'-P-CCNC: 13 U/L (ref 10–44)
ALT SERPL W/O P-5'-P-CCNC: 15 U/L (ref 10–44)
ANION GAP SERPL CALC-SCNC: 10 MMOL/L (ref 8–16)
ANION GAP SERPL CALC-SCNC: 8 MMOL/L (ref 8–16)
APTT PPP: 32.5 SEC (ref 21–32)
AST SERPL-CCNC: 39 U/L (ref 10–40)
AST SERPL-CCNC: 44 U/L (ref 10–40)
BASOPHILS # BLD AUTO: 0.02 K/UL (ref 0–0.2)
BASOPHILS # BLD AUTO: 0.03 K/UL (ref 0–0.2)
BASOPHILS NFR BLD: 0.3 % (ref 0–1.9)
BASOPHILS NFR BLD: 0.4 % (ref 0–1.9)
BILIRUB SERPL-MCNC: 2.4 MG/DL (ref 0.1–1)
BILIRUB SERPL-MCNC: 2.5 MG/DL (ref 0.1–1)
BUN SERPL-MCNC: 8 MG/DL (ref 6–20)
BUN SERPL-MCNC: 8 MG/DL (ref 6–20)
CALCIUM SERPL-MCNC: 8.7 MG/DL (ref 8.7–10.5)
CALCIUM SERPL-MCNC: 8.8 MG/DL (ref 8.7–10.5)
CHLORIDE SERPL-SCNC: 98 MMOL/L (ref 95–110)
CHLORIDE SERPL-SCNC: 99 MMOL/L (ref 95–110)
CO2 SERPL-SCNC: 26 MMOL/L (ref 23–29)
CO2 SERPL-SCNC: 26 MMOL/L (ref 23–29)
CREAT SERPL-MCNC: 0.9 MG/DL (ref 0.5–1.4)
CREAT SERPL-MCNC: 1 MG/DL (ref 0.5–1.4)
DELSYS: ABNORMAL
DIFFERENTIAL METHOD BLD: ABNORMAL
DIFFERENTIAL METHOD BLD: ABNORMAL
EOSINOPHIL # BLD AUTO: 0 K/UL (ref 0–0.5)
EOSINOPHIL # BLD AUTO: 0 K/UL (ref 0–0.5)
EOSINOPHIL NFR BLD: 0.3 % (ref 0–8)
EOSINOPHIL NFR BLD: 0.4 % (ref 0–8)
ERYTHROCYTE [DISTWIDTH] IN BLOOD BY AUTOMATED COUNT: 24.9 % (ref 11.5–14.5)
ERYTHROCYTE [DISTWIDTH] IN BLOOD BY AUTOMATED COUNT: 25 % (ref 11.5–14.5)
EST. GFR  (NO RACE VARIABLE): >60 ML/MIN/1.73 M^2
EST. GFR  (NO RACE VARIABLE): >60 ML/MIN/1.73 M^2
GLUCOSE SERPL-MCNC: 205 MG/DL (ref 70–110)
GLUCOSE SERPL-MCNC: 81 MG/DL (ref 70–110)
HCO3 UR-SCNC: 28.4 MMOL/L (ref 24–28)
HCT VFR BLD AUTO: 29.8 % (ref 40–54)
HCT VFR BLD AUTO: 32.3 % (ref 40–54)
HGB BLD-MCNC: 8.1 G/DL (ref 14–18)
HGB BLD-MCNC: 8.8 G/DL (ref 14–18)
IMM GRANULOCYTES # BLD AUTO: 0.02 K/UL (ref 0–0.04)
IMM GRANULOCYTES # BLD AUTO: 0.02 K/UL (ref 0–0.04)
IMM GRANULOCYTES NFR BLD AUTO: 0.3 % (ref 0–0.5)
IMM GRANULOCYTES NFR BLD AUTO: 0.3 % (ref 0–0.5)
INR PPP: 2.2 (ref 0.8–1.2)
LDH SERPL L TO P-CCNC: 294 U/L (ref 110–260)
LYMPHOCYTES # BLD AUTO: 1.2 K/UL (ref 1–4.8)
LYMPHOCYTES # BLD AUTO: 1.2 K/UL (ref 1–4.8)
LYMPHOCYTES NFR BLD: 16.8 % (ref 18–48)
LYMPHOCYTES NFR BLD: 18.6 % (ref 18–48)
MAGNESIUM SERPL-MCNC: 2 MG/DL (ref 1.6–2.6)
MCH RBC QN AUTO: 16.5 PG (ref 27–31)
MCH RBC QN AUTO: 16.6 PG (ref 27–31)
MCHC RBC AUTO-ENTMCNC: 27.2 G/DL (ref 32–36)
MCHC RBC AUTO-ENTMCNC: 27.2 G/DL (ref 32–36)
MCV RBC AUTO: 61 FL (ref 82–98)
MCV RBC AUTO: 61 FL (ref 82–98)
MODE: ABNORMAL
MONOCYTES # BLD AUTO: 0.6 K/UL (ref 0.3–1)
MONOCYTES # BLD AUTO: 0.6 K/UL (ref 0.3–1)
MONOCYTES NFR BLD: 8.7 % (ref 4–15)
MONOCYTES NFR BLD: 8.8 % (ref 4–15)
NEUTROPHILS # BLD AUTO: 4.8 K/UL (ref 1.8–7.7)
NEUTROPHILS # BLD AUTO: 5.4 K/UL (ref 1.8–7.7)
NEUTROPHILS NFR BLD: 71.5 % (ref 38–73)
NEUTROPHILS NFR BLD: 73.6 % (ref 38–73)
NRBC BLD-RTO: 1 /100 WBC
NRBC BLD-RTO: 1 /100 WBC
PCO2 BLDA: 44.8 MMHG (ref 35–45)
PH SMN: 7.41 [PH] (ref 7.35–7.45)
PHOSPHATE SERPL-MCNC: 1.9 MG/DL (ref 2.7–4.5)
PHOSPHATE SERPL-MCNC: 2.4 MG/DL (ref 2.7–4.5)
PLATELET # BLD AUTO: 289 K/UL (ref 150–450)
PLATELET # BLD AUTO: 295 K/UL (ref 150–450)
PMV BLD AUTO: ABNORMAL FL (ref 9.2–12.9)
PMV BLD AUTO: ABNORMAL FL (ref 9.2–12.9)
PO2 BLDA: 29 MMHG (ref 40–60)
POC BE: 4 MMOL/L
POC SATURATED O2: 54 % (ref 95–100)
POC TCO2: 30 MMOL/L (ref 24–29)
POCT GLUCOSE: 102 MG/DL (ref 70–110)
POCT GLUCOSE: 110 MG/DL (ref 70–110)
POCT GLUCOSE: 154 MG/DL (ref 70–110)
POCT GLUCOSE: 157 MG/DL (ref 70–110)
POCT GLUCOSE: 197 MG/DL (ref 70–110)
POCT GLUCOSE: 214 MG/DL (ref 70–110)
POCT GLUCOSE: 94 MG/DL (ref 70–110)
POTASSIUM SERPL-SCNC: 2.8 MMOL/L (ref 3.5–5.1)
POTASSIUM SERPL-SCNC: 3.6 MMOL/L (ref 3.5–5.1)
PROT SERPL-MCNC: 7.2 G/DL (ref 6–8.4)
PROT SERPL-MCNC: 7.3 G/DL (ref 6–8.4)
PROTHROMBIN TIME: 23.2 SEC (ref 9–12.5)
RBC # BLD AUTO: 4.87 M/UL (ref 4.6–6.2)
RBC # BLD AUTO: 5.34 M/UL (ref 4.6–6.2)
SAMPLE: ABNORMAL
SITE: ABNORMAL
SODIUM SERPL-SCNC: 132 MMOL/L (ref 136–145)
SODIUM SERPL-SCNC: 135 MMOL/L (ref 136–145)
SP02: 100
WBC # BLD AUTO: 6.67 K/UL (ref 3.9–12.7)
WBC # BLD AUTO: 7.34 K/UL (ref 3.9–12.7)

## 2024-04-21 PROCEDURE — A4216 STERILE WATER/SALINE, 10 ML: HCPCS | Performed by: INTERNAL MEDICINE

## 2024-04-21 PROCEDURE — 94761 N-INVAS EAR/PLS OXIMETRY MLT: CPT | Mod: XB

## 2024-04-21 PROCEDURE — 82803 BLOOD GASES ANY COMBINATION: CPT

## 2024-04-21 PROCEDURE — 85730 THROMBOPLASTIN TIME PARTIAL: CPT | Performed by: INTERNAL MEDICINE

## 2024-04-21 PROCEDURE — 93750 INTERROGATION VAD IN PERSON: CPT | Mod: ,,, | Performed by: INTERNAL MEDICINE

## 2024-04-21 PROCEDURE — 84100 ASSAY OF PHOSPHORUS: CPT | Performed by: INTERNAL MEDICINE

## 2024-04-21 PROCEDURE — 84100 ASSAY OF PHOSPHORUS: CPT | Mod: 91 | Performed by: INTERNAL MEDICINE

## 2024-04-21 PROCEDURE — 99900035 HC TECH TIME PER 15 MIN (STAT)

## 2024-04-21 PROCEDURE — 63600175 PHARM REV CODE 636 W HCPCS: Performed by: NURSE PRACTITIONER

## 2024-04-21 PROCEDURE — 20600001 HC STEP DOWN PRIVATE ROOM

## 2024-04-21 PROCEDURE — 80053 COMPREHEN METABOLIC PANEL: CPT | Mod: 91 | Performed by: INTERNAL MEDICINE

## 2024-04-21 PROCEDURE — 63600175 PHARM REV CODE 636 W HCPCS: Performed by: INTERNAL MEDICINE

## 2024-04-21 PROCEDURE — 85025 COMPLETE CBC W/AUTO DIFF WBC: CPT | Mod: 91 | Performed by: INTERNAL MEDICINE

## 2024-04-21 PROCEDURE — 25000003 PHARM REV CODE 250: Performed by: NURSE PRACTITIONER

## 2024-04-21 PROCEDURE — 85025 COMPLETE CBC W/AUTO DIFF WBC: CPT | Performed by: INTERNAL MEDICINE

## 2024-04-21 PROCEDURE — 83735 ASSAY OF MAGNESIUM: CPT | Performed by: INTERNAL MEDICINE

## 2024-04-21 PROCEDURE — 25000003 PHARM REV CODE 250: Performed by: PHYSICIAN ASSISTANT

## 2024-04-21 PROCEDURE — 80053 COMPREHEN METABOLIC PANEL: CPT | Performed by: PHYSICIAN ASSISTANT

## 2024-04-21 PROCEDURE — 99233 SBSQ HOSP IP/OBS HIGH 50: CPT | Mod: ,,, | Performed by: NURSE PRACTITIONER

## 2024-04-21 PROCEDURE — 27000248 HC VAD-ADDITIONAL DAY

## 2024-04-21 PROCEDURE — 99233 SBSQ HOSP IP/OBS HIGH 50: CPT | Mod: ,,, | Performed by: PHYSICIAN ASSISTANT

## 2024-04-21 PROCEDURE — 25000003 PHARM REV CODE 250: Performed by: INTERNAL MEDICINE

## 2024-04-21 PROCEDURE — 85610 PROTHROMBIN TIME: CPT | Performed by: INTERNAL MEDICINE

## 2024-04-21 PROCEDURE — 83615 LACTATE (LD) (LDH) ENZYME: CPT | Performed by: INTERNAL MEDICINE

## 2024-04-21 RX ORDER — INSULIN ASPART 100 [IU]/ML
10 INJECTION, SOLUTION INTRAVENOUS; SUBCUTANEOUS
Status: DISCONTINUED | OUTPATIENT
Start: 2024-04-21 | End: 2024-04-22

## 2024-04-21 RX ORDER — POTASSIUM CHLORIDE 20 MEQ/1
40 TABLET, EXTENDED RELEASE ORAL 4 TIMES DAILY
Status: DISCONTINUED | OUTPATIENT
Start: 2024-04-21 | End: 2024-04-22

## 2024-04-21 RX ADMIN — INSULIN ASPART 2 UNITS: 100 INJECTION, SOLUTION INTRAVENOUS; SUBCUTANEOUS at 09:04

## 2024-04-21 RX ADMIN — LEVETIRACETAM 1500 MG: 500 TABLET, FILM COATED ORAL at 09:04

## 2024-04-21 RX ADMIN — Medication 10 ML: at 12:04

## 2024-04-21 RX ADMIN — INSULIN HUMAN 1.3 UNITS/HR: 1 INJECTION, SOLUTION INTRAVENOUS at 03:04

## 2024-04-21 RX ADMIN — INSULIN ASPART 4 UNITS: 100 INJECTION, SOLUTION INTRAVENOUS; SUBCUTANEOUS at 05:04

## 2024-04-21 RX ADMIN — INSULIN ASPART 10 UNITS: 100 INJECTION, SOLUTION INTRAVENOUS; SUBCUTANEOUS at 12:04

## 2024-04-21 RX ADMIN — MUPIROCIN: 20 OINTMENT TOPICAL at 09:04

## 2024-04-21 RX ADMIN — PANTOPRAZOLE SODIUM 40 MG: 40 TABLET, DELAYED RELEASE ORAL at 08:04

## 2024-04-21 RX ADMIN — POTASSIUM CHLORIDE 40 MEQ: 1500 TABLET, EXTENDED RELEASE ORAL at 12:04

## 2024-04-21 RX ADMIN — SPIRONOLACTONE 25 MG: 25 TABLET ORAL at 08:04

## 2024-04-21 RX ADMIN — MILRINONE LACTATE IN DEXTROSE 0.25 MCG/KG/MIN: 200 INJECTION, SOLUTION INTRAVENOUS at 11:04

## 2024-04-21 RX ADMIN — Medication 400 MG: at 08:04

## 2024-04-21 RX ADMIN — AMLODIPINE BESYLATE 5 MG: 5 TABLET ORAL at 08:04

## 2024-04-21 RX ADMIN — INSULIN ASPART 10 UNITS: 100 INJECTION, SOLUTION INTRAVENOUS; SUBCUTANEOUS at 05:04

## 2024-04-21 RX ADMIN — ASPIRIN 81 MG: 81 TABLET, COATED ORAL at 08:04

## 2024-04-21 RX ADMIN — Medication 10 ML: at 06:04

## 2024-04-21 RX ADMIN — POTASSIUM CHLORIDE 40 MEQ: 1500 TABLET, EXTENDED RELEASE ORAL at 09:04

## 2024-04-21 RX ADMIN — WARFARIN SODIUM 5 MG: 5 TABLET ORAL at 05:04

## 2024-04-21 RX ADMIN — Medication 10 ML: at 05:04

## 2024-04-21 RX ADMIN — ATORVASTATIN CALCIUM 40 MG: 20 TABLET, FILM COATED ORAL at 08:04

## 2024-04-21 RX ADMIN — LEVETIRACETAM 1500 MG: 500 TABLET, FILM COATED ORAL at 08:04

## 2024-04-21 RX ADMIN — POTASSIUM CHLORIDE 40 MEQ: 1500 TABLET, EXTENDED RELEASE ORAL at 05:04

## 2024-04-21 RX ADMIN — POTASSIUM CHLORIDE 40 MEQ: 1500 TABLET, EXTENDED RELEASE ORAL at 08:04

## 2024-04-21 RX ADMIN — MILRINONE LACTATE IN DEXTROSE 0.25 MCG/KG/MIN: 200 INJECTION, SOLUTION INTRAVENOUS at 08:04

## 2024-04-21 RX ADMIN — GABAPENTIN 300 MG: 300 CAPSULE ORAL at 08:04

## 2024-04-21 RX ADMIN — INSULIN ASPART 2 UNITS: 100 INJECTION, SOLUTION INTRAVENOUS; SUBCUTANEOUS at 12:04

## 2024-04-21 RX ADMIN — MUPIROCIN: 20 OINTMENT TOPICAL at 08:04

## 2024-04-21 NOTE — ASSESSMENT & PLAN NOTE
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  -Continue Coumadin; INR goal 2.0-3.0,  INR is therapeutic today   -Antiplatelets: ASA 81  -LDH is stable.  Will continue to monitor daily  -Speed set at 5100, LSL 4700 rpm  -Echo: 9/5/23 EF: 10-15%, LVEDD: 7.11cm.        Procedure: Device Interrogation Including analysis of device parameters  Current Settings: Ventricular Assist Device  Review of device function is stable        4/21/2024     7:50 AM 4/21/2024     5:08 AM 4/20/2024    11:55 PM 4/20/2024     7:25 PM 4/20/2024     4:31 PM 4/20/2024    12:09 PM 4/20/2024     8:10 AM   TXP LVAD INTERROGATIONS   Type HeartMate3 HeartMate3 HeartMate3 HeartMate3 HeartMate3 HeartMate3 HeartMate3   Flow 4.1 3.9 4 5.7 4 4.2 4.2   Speed 5100 5100 5100 5100 5100 5100 5100   PI 4.7 6 6.1 6.8 5.7 5.4 5.7   Power (Serna) 3.7 3.7 3.7 3.6 3.7 3.6 3.8   LSL 4700    4700 4700 4700   Pulsatility Intermittent pulse    Intermittent pulse Intermittent pulse

## 2024-04-21 NOTE — ASSESSMENT & PLAN NOTE
Endocrinology consulted for BG management.   BG goal 140-180     - D/C IIP  - Transition drip at 1.3 units/hr with step-down parameters.   - Novolog (aspart) insulin 10 Units SQ TIDWM and prn for BG excursions MDC SSI (150/25).  - BG checks q4hr  - Hypoglycemia protocol in place    ** Please notify Endocrine for any change and/or advance in diet**  ** Please call Endocrine for any BG related issues **    Discharge Planning:   TBD. Please notify endocrinology prior to discharge.

## 2024-04-21 NOTE — PROGRESS NOTES
[facility] - Cardiology Stepdown  Endocrinology  Progress Note    Admit Date: 4/20/2024     Reason for Consult: Management of T2DM, Hyperglycemia      Surgical Procedure and Date: LVAD 06/29/2022     Diabetes diagnosis year: 2022     Home Diabetes Medications:   -Levemir 30 units QD   -Novolog 20 units TIDWM in addition to the following correction scale:    150 - 200 + 2 unit    201 - 250 + 4 units    251 - 300 + 6 units    301 - 350 + 8 units       > 350   + 10 units     How often checking glucose at home?  > 4 times per day  BG readings on regimen: 180's-200's  Hypoglycemia on the regimen?  No  Missed doses on regimen?  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 He underwent RVAD removal and chest closure 6/30/2022.  He also completed a course of IV Abx for staph epi. He was weaned off  but he had to restarted due to RVF. He was eventually transitioned to milrinone (secondary to  shortage) now on 0.25 mcg/kg/min. He has a history of unclear syncope vs seizures and is followed by neuro for this and is on Keppra.   On 11/15/23 underwent removal of eroded [employer] scICD--no Lifevest (due to LVAD) and no plan to replace ICD as not requiring therapy post LVAD with concern for reinfection.  He has not had neurology f/u with seizure hx on keppra so coordinator to assist him with obtaining appt.  Patient presents for concerns for fever, vomiting and pain and concerns of driveline infection, low INR and hyperglycemia. Beta hydroxybutyrate  "negative. Lactic acid negative. Normal WBC count and CT Abd/Pelv with contrast without signs of driveline infection. Had elevated blood glucose 2024 and was advised to go to ED. Also advised to go to ED in the first week of April for fever of 102*F (reported) but did not go at that time. Patient reports that he has been taking lower doses of Lasix at home as compared to what has been prescribed. Exam consistent with hypervolemia. Bedside TTE with IVC 2.5 cm and non collapsible IVC. No inflow outflow cannula issues visualized. Endocrine consulted to manage hyperglycemia and type 2 diabetes.     Lab Results   Component Value Date    HGBA1C >14.0 (H) 2024         Interval HPI:   Overnight events: No acute events overnight. Patient in room 315/315 A. Blood glucose improving. BG at and below goal on current insulin regimen (Transition Insulin Drip). Steroid use- None.    Renal function- Normal   Vasopressors-  None       Endocrine will continue to follow and manage insulin orders inpatient.         Diet diabetic  Calorie '  Eatin%  Nausea: No  Hypoglycemia and intervention: No  Fever: No  TPN and/or TF: No      BP (!) 78/0 (BP Location: Right arm, Patient Position: Lying)   Pulse (!) 113   Temp 98 °F (36.7 °C) (Oral)   Resp 16   Ht 6' 3" (1.905 m)   Wt 81.1 kg (178 lb 13.4 oz)   SpO2 97%   BMI 22.35 kg/m²     Labs Reviewed and Include    Recent Labs   Lab 24  1802   *   CALCIUM 9.0   ALBUMIN 2.9*   PROT 7.6   *   K 3.1*   CO2 25   CL 95   BUN 11   CREATININE 1.0   ALKPHOS 176*   ALT 15   AST 35   BILITOT 2.4*     Lab Results   Component Value Date    WBC 7.34 2024    HGB 8.1 (L) 2024    HCT 29.8 (L) 2024    MCV 61 (L) 2024     2024     No results for input(s): "TSH", "FREET4" in the last 168 hours.  Lab Results   Component Value Date    HGBA1C >14.0 (H) 2024       Nutritional status:   Body mass index is 22.35 kg/m².  Lab Results "   Component Value Date    ALBUMIN 2.9 (L) 04/20/2024    ALBUMIN 3.1 (L) 04/20/2024    ALBUMIN 3.0 (L) 02/17/2024     Lab Results   Component Value Date    PREALBUMIN 14 (L) 01/03/2024    PREALBUMIN 21 11/15/2023    PREALBUMIN 17 (L) 11/13/2023       Estimated Creatinine Clearance: 113.8 mL/min (based on SCr of 1 mg/dL).    Accu-Checks  Recent Labs     04/20/24  1305 04/20/24  1416 04/20/24  1612 04/20/24  1731 04/20/24  1824 04/20/24  1935 04/20/24  2341 04/21/24  0515 04/21/24  0751 04/21/24  0824   POCTGLUCOSE 344* 229* 173* 157* 152* 174* 198* 102 94 110       Current Medications and/or Treatments Impacting Glycemic Control  Immunotherapy:    Immunosuppressants       None          Steroids:   Hormones (From admission, onward)      None          Pressors:    Autonomic Drugs (From admission, onward)      None          Hyperglycemia/Diabetes Medications:   Antihyperglycemics (From admission, onward)      Start     Stop Route Frequency Ordered    04/21/24 1130  insulin aspart U-100 pen 10 Units         -- SubQ 3 times daily with meals 04/21/24 0916    04/20/24 2100  insulin regular in 0.9 % NaCl 100 unit/100 mL (1 unit/mL) infusion        Question:  Enter initial dose (Units/hr):  Answer:  1.3    -- IV Continuous 04/20/24 1947 04/20/24 2046  insulin aspart U-100 pen 0-10 Units         -- SubQ Before meals, nightly and at 0200 PRN 04/20/24 1947            ASSESSMENT and PLAN    Cardiac/Vascular  * Acute decompensated heart failure  Managed per primary team  Avoid hypoglycemia        LVAD (left ventricular assist device) present  Managed per primary team  Avoid hypoglycemia        Endocrine  Type 2 diabetes mellitus with hyperglycemia  Endocrinology consulted for BG management.   BG goal 140-180     - D/C IIP  - Transition drip at 1.3 units/hr with step-down parameters.   - Novolog (aspart) insulin 10 Units SQ TIDWM and prn for BG excursions MDC SSI (150/25).  - BG checks q4hr  - Hypoglycemia protocol in place    **  Please notify Endocrine for any change and/or advance in diet**  ** Please call Endocrine for any BG related issues **    Discharge Planning:   TBD. Please notify endocrinology prior to discharge.               Michael Wyman, DNP, FNP  Endocrinology  [facility] - Cardiology Stepdown

## 2024-04-21 NOTE — SUBJECTIVE & OBJECTIVE
"Interval HPI:   Overnight events: No acute events overnight. Patient in room 315/315 A. Blood glucose improving. BG at and below goal on current insulin regimen (Transition Insulin Drip). Steroid use- None.    Renal function- Normal   Vasopressors-  None       Endocrine will continue to follow and manage insulin orders inpatient.         Diet diabetic  Calorie '  Eatin%  Nausea: No  Hypoglycemia and intervention: No  Fever: No  TPN and/or TF: No      BP (!) 78/0 (BP Location: Right arm, Patient Position: Lying)   Pulse (!) 113   Temp 98 °F (36.7 °C) (Oral)   Resp 16   Ht 6' 3" (1.905 m)   Wt 81.1 kg (178 lb 13.4 oz)   SpO2 97%   BMI 22.35 kg/m²     Labs Reviewed and Include    Recent Labs   Lab 24  1802   *   CALCIUM 9.0   ALBUMIN 2.9*   PROT 7.6   *   K 3.1*   CO2 25   CL 95   BUN 11   CREATININE 1.0   ALKPHOS 176*   ALT 15   AST 35   BILITOT 2.4*     Lab Results   Component Value Date    WBC 7.34 2024    HGB 8.1 (L) 2024    HCT 29.8 (L) 2024    MCV 61 (L) 2024     2024     No results for input(s): "TSH", "FREET4" in the last 168 hours.  Lab Results   Component Value Date    HGBA1C >14.0 (H) 2024       Nutritional status:   Body mass index is 22.35 kg/m².  Lab Results   Component Value Date    ALBUMIN 2.9 (L) 2024    ALBUMIN 3.1 (L) 2024    ALBUMIN 3.0 (L) 2024     Lab Results   Component Value Date    PREALBUMIN 14 (L) 2024    PREALBUMIN 21 11/15/2023    PREALBUMIN 17 (L) 2023       Estimated Creatinine Clearance: 113.8 mL/min (based on SCr of 1 mg/dL).    Accu-Checks  Recent Labs     24  1305 24  1416 24  1612 24  1731 24  1824 24  1935 24  2341 24  0515 24  0751 24  0824   POCTGLUCOSE 344* 229* 173* 157* 152* 174* 198* 102 94 110       Current Medications and/or Treatments Impacting Glycemic Control  Immunotherapy:    Immunosuppressants       " None          Steroids:   Hormones (From admission, onward)      None          Pressors:    Autonomic Drugs (From admission, onward)      None          Hyperglycemia/Diabetes Medications:   Antihyperglycemics (From admission, onward)      Start     Stop Route Frequency Ordered    04/21/24 1130  insulin aspart U-100 pen 10 Units         -- SubQ 3 times daily with meals 04/21/24 0916    04/20/24 2100  insulin regular in 0.9 % NaCl 100 unit/100 mL (1 unit/mL) infusion        Question:  Enter initial dose (Units/hr):  Answer:  1.3    -- IV Continuous 04/20/24 1947 04/20/24 2046  insulin aspart U-100 pen 0-10 Units         -- SubQ Before meals, nightly and at 0200 PRN 04/20/24 1947

## 2024-04-21 NOTE — PROGRESS NOTES
04/21/2024  Ruma Li    Current provider:  Natalya Villatoro MD    Device interrogation:      4/21/2024     7:50 AM 4/21/2024     5:08 AM 4/20/2024    11:55 PM 4/20/2024     7:25 PM 4/20/2024     4:31 PM 4/20/2024    12:09 PM 4/20/2024     8:10 AM   TXP LVAD INTERROGATIONS   Type HeartMate3 HeartMate3 HeartMate3 HeartMate3 HeartMate3 HeartMate3 HeartMate3   Flow 4.1 3.9 4 5.7 4 4.2 4.2   Speed 5100 5100 5100 5100 5100 5100 5100   PI 4.7 6 6.1 6.8 5.7 5.4 5.7   Power (Serna) 3.7 3.7 3.7 3.6 3.7 3.6 3.8   LSL 4700    4700 4700 4700   Pulsatility Intermittent pulse    Intermittent pulse Intermittent pulse           Rounded on Kevan Queen to ensure all mechanical assist device settings (IABP or VAD) were appropriate and all parameters were within limits.  I was able to ensure all back up equipment was present, the staff had no issues, and the Perfusion Department daily rounding was complete.      For implantable VADs: Interrogation of Ventricular assist device was performed with analysis of device parameters and review of device function. I have personally reviewed the interrogation findings and agree with findings as stated.     In emergency, the nursing units have been notified to contact the perfusion department either by:  Calling [phone] from 630am to 4pm Mon thru Fri, utilizing the On-Call Finder functionality of Epic and searching for Perfusion, or by contacting the hospital  from 4pm to 630am and on weekends and asking to speak with the perfusionist on call.    8:19 AM

## 2024-04-21 NOTE — PROGRESS NOTES
[facility] - Cardiology Stepdown  Heart Transplant  Progress Note    Patient Name: Kevan Queen  MRN: [id number]  Admission Date: 4/20/2024  Hospital Length of Stay: 1 days  Attending Physician: Natalya Villatoro MD  Primary Care Provider: Rosio Armendariz FNP  Principal Problem:Acute on chronic combined systolic and diastolic heart failure    Subjective:   Interval History: patient with no new complaints today.  Diuresed well on IV Lasix and is now on po.  Although he refused am dose today.  PICC line replaced yesterday.  HE is net negative 2.8L in the last 24 hours.  CVP: 12, SVO2: 54, CO: 5.68, CI: 2.74, SVR: 1098.  INR is therapeutic at 2.2.  He was started on Norvasc for BP control.  Remains on insulin drip and Endocrine following.  Plan to discharge once off insulin infusion and glucose is controlled.     Continuous Infusions:  Current Facility-Administered Medications   Medication Dose Route Frequency Last Rate Last Admin    dextrose 5 % and 0.45 % NaCl   Intravenous Continuous PRN        insulin regular 1 units/mL infusion orderable (TRANSFER)  1.3 Units/hr Intravenous Continuous 1.3 mL/hr at 04/21/24 0837 1.3 Units/hr at 04/21/24 0837    milrinone 20mg/100ml D5W (200mcg/ml)  0.25 mcg/kg/min Intravenous Continuous 7.1 mL/hr at 04/21/24 0837 0.25 mcg/kg/min at 04/21/24 0837     Scheduled Meds:  Current Facility-Administered Medications   Medication Dose Route Frequency    amLODIPine  5 mg Oral Daily    aspirin  81 mg Oral Daily    atorvastatin  40 mg Oral Daily    furosemide  40 mg Oral Daily    gabapentin  300 mg Oral Daily    insulin aspart U-100  10 Units Subcutaneous TIDWM    levETIRAcetam  1,500 mg Oral BID    magnesium oxide  400 mg Oral Daily    mirtazapine  15 mg Oral Nightly    mupirocin   Nasal BID    pantoprazole  40 mg Oral Daily    potassium chloride  40 mEq Oral Daily    sodium chloride 0.9%  10 mL Intravenous Q6H    spironolactone  25 mg Oral Daily    warfarin  5 mg Oral Daily     PRN  Meds:  Current Facility-Administered Medications:     acetaminophen, 650 mg, Oral, Q6H PRN    cyclobenzaprine, 5 mg, Oral, TID PRN    dextrose 10%, 12.5 g, Intravenous, PRN    dextrose 10%, 25 g, Intravenous, PRN    dextrose 5 % and 0.45 % NaCl, , Intravenous, Continuous PRN    glucagon (human recombinant), 1 mg, Intramuscular, PRN    glucose, 16 g, Oral, PRN    glucose, 24 g, Oral, PRN    insulin aspart U-100, 0-10 Units, Subcutaneous, AC + HS + 0200 PRN    ondansetron, 4 mg, Oral, Q8H PRN    sodium chloride 0.9%, 10 mL, Intravenous, PRN    Flushing PICC/Midline Protocol, , , Until Discontinued **AND** sodium chloride 0.9%, 10 mL, Intravenous, Q6H **AND** sodium chloride 0.9%, 10 mL, Intravenous, PRN    Review of patient's allergies indicates:   Allergen Reactions    Bumex [bumetanide] Hives    Torsemide Hives     Objective:     Vital Signs (Most Recent):  Temp: 98 °F (36.7 °C) (04/21/24 0801)  Pulse: (!) 113 (04/21/24 0801)  Resp: 16 (04/21/24 0801)  BP: (!) 78/0 (04/21/24 0802)  SpO2: 97 % (04/21/24 0801) Vital Signs (24h Range):  Temp:  [98 °F (36.7 °C)-98.8 °F (37.1 °C)] 98 °F (36.7 °C)  Pulse:  [] 113  Resp:  [16-19] 16  SpO2:  [96 %-100 %] 97 %  BP: ()/(0-89) 78/0     Patient Vitals for the past 72 hrs (Last 3 readings):   Weight   04/21/24 0506 81.1 kg (178 lb 13.4 oz)   04/20/24 0947 81.3 kg (179 lb 3.7 oz)   04/20/24 0528 81.3 kg (179 lb 3.7 oz)     Body mass index is 22.35 kg/m².      Intake/Output Summary (Last 24 hours) at 4/21/2024 1014  Last data filed at 4/21/2024 0841  Gross per 24 hour   Intake 840 ml   Output 2700 ml   Net -1860 ml       Hemodynamic Parameters:       Telemetry: reviewed     Physical Exam  Vitals and nursing note reviewed.   Constitutional:       Appearance: Normal appearance.   HENT:      Head: Normocephalic and atraumatic.      Nose: Nose normal.   Eyes:      Pupils: Pupils are equal, round, and reactive to light.   Neck:      Comments: JVP elevation to mid neck  "  Cardiovascular:      Rate and Rhythm: Normal rate and regular rhythm.      Comments: Smooth VAD hum  Pulmonary:      Effort: Pulmonary effort is normal.      Breath sounds: Normal breath sounds.   Abdominal:      General: Abdomen is flat. There is no distension.      Palpations: Abdomen is soft.   Musculoskeletal:         General: Normal range of motion.   Skin:     General: Skin is warm and dry.      Capillary Refill: Capillary refill takes 2 to 3 seconds.   Neurological:      General: No focal deficit present.      Mental Status: He is alert and oriented to person, place, and time.   Psychiatric:         Mood and Affect: Mood normal.         Behavior: Behavior normal.            Significant Labs:  CBC:  Recent Labs   Lab 04/20/24  0209 04/20/24  0222 04/21/24  0519   WBC 6.23  --  7.34   RBC 4.87  --  4.87   HGB 8.3*  --  8.1*   HCT 29.5* 36 29.8*     --  289   MCV 61*  --  61*   MCH 17.0*  --  16.6*   MCHC 28.1*  --  27.2*     BNP:  No results for input(s): "BNP" in the last 168 hours.    Invalid input(s): "BNPTRIAGELBLO"  CMP:  Recent Labs   Lab 04/20/24  0209 04/20/24  0600 04/20/24  1802 04/21/24  0812   * 680* 138* 81   CALCIUM 9.0 8.8 9.0 8.7   ALBUMIN 3.1*  --  2.9* 2.7*   PROT 8.0  --  7.6 7.2   * 123* 131* 132*   K 4.3 3.9 3.1* 2.8*   CO2 18* 20* 25 26   CL 95 92* 95 98   BUN 11 12 11 8   CREATININE 1.4 1.3 1.0 0.9   ALKPHOS 183*  --  176* 167*   ALT 18  --  15 13   AST 32  --  35 39   BILITOT 3.0*  --  2.4* 2.4*      Coagulation:   Recent Labs   Lab 04/16/24  0000 04/20/24  0209 04/21/24  0519   INR 3.32 1.9* 2.2*   APTT  --  32.5* 32.5*     LDH:  Recent Labs   Lab 04/20/24  0209 04/21/24  0519   * 294*     Microbiology:  Microbiology Results (last 7 days)       Procedure Component Value Units Date/Time    Blood culture x two cultures. Draw prior to antibiotics. [[id number]] Collected: 04/20/24 0210    Order Status: Completed Specimen: Blood from Peripheral, Hand, Left " Updated: 04/21/24 0613     Blood Culture, Routine No Growth to date      No Growth to date    Narrative:      Aerobic and anaerobic    Blood culture x two cultures. Draw prior to antibiotics. [[id number]] Collected: 04/20/24 0209    Order Status: Completed Specimen: Blood from Peripheral, Antecubital, Left Updated: 04/21/24 0613     Blood Culture, Routine No Growth to date      No Growth to date    Narrative:      Aerobic and anaerobic            I have reviewed all pertinent labs within the past 24 hours.    Estimated Creatinine Clearance: 126.4 mL/min (based on SCr of 0.9 mg/dL).    Diagnostic Results:  I have reviewed all pertinent imaging results/findings within the past 24 hours.  Assessment and Plan:     Patient is a 39 year old male with stage D 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 He underwent RVAD removal and chest closure 6/30/2022.  He also completed a course of IV Abx for staph epi. He was weaned off  but he had to restarted due to RVF. He was eventually transitioned to milrinone (secondary to  shortage) now on 0.25 mcg/kg/min. He has a history of unclear syncope vs seizures and is followed by neuro for this and is on Keppra.       On 11/15/23 underwent removal of eroded [employer] scICD--no Lifevest (due to LVAD) and no plan to replace ICD as not requiring therapy post LVAD with concern for reinfection.  He has not had neurology f/u with seizure hx on keppra so coordinator to assist him with obtaining appt.  Patient presents for concerns for fever, vomiting and pain and concerns of driveline infection, low INR and hyperglycemia. Beta hydroxybutyrate negative. Lactic acid negative. Normal WBC count and CT Abd/Pelv with contrast without signs of driveline  infection. Had elevated blood glucose 03/19/2024 and was advised to go to ED. Also advised to go to ED in the first week of April for fever of 102*F (reported) but did not go at that time. Patient reports that he has been taking lower doses of Lasix at home as compared to what has been prescribed. Exam consistent with hypervolemia. Bedside TTE with IVC 2.5 cm and non collapsible IVC. No inflow outflow cannula issues visualized.     * Acute on chronic combined systolic and diastolic heart failure  -NICM s/p HM 3 on home Milrinone   -Last 2D Echo  9/5/23 : LVEF 10-15%, LVEDD  7.11 cm with speed at 5100  -Last RHC: 10/31/22 with speed at 5100 on Milrinone 0.125 RA: 18, RV: 35/7 (22), PAP: 35/19 (24), PWP: 21, CO: 3.55, CI: 1.6  -Hypervolemic on examination today  -Diuresed on IV Lasix and was transitioned to po Lasix at 40mg Qdaily.  He is net negative 6.9L throughout hospital stay.  CVP: 12, SVo2: 54, CO: 5.68, CI: 2.74, SVR: 1098.  He refused his Lasix po dose this am  -GDMT with N/A  -2g Na dietary restriction, 1500 mL fluid restriction, strict I/Os      Type 2 diabetes mellitus with hyperosmolar hyperglycemic state (HHS)  -Blood glucose 592 mg/dL on ISTAT, anion gap 13, with blood glucose on serum 602 mg/dL  -Endocrinology consult and patient on insulin drip  -management per endocrine      LVAD (left ventricular assist device) present  -HeartMate 3 Implanted on 6/29/2022 as DT with RV failure on milrinone at 0.25 mcg/kg/min.  -Continue Coumadin; INR goal 2.0-3.0,  INR is therapeutic today   -Antiplatelets: ASA 81  -LDH is stable.  Will continue to monitor daily  -Speed set at 5100, LSL 4700 rpm  -Echo: 9/5/23 EF: 10-15%, LVEDD: 7.11cm.        Procedure: Device Interrogation Including analysis of device parameters  Current Settings: Ventricular Assist Device  Review of device function is stable        4/21/2024     7:50 AM 4/21/2024     5:08 AM 4/20/2024    11:55 PM 4/20/2024     7:25 PM 4/20/2024     4:31 PM  4/20/2024    12:09 PM 4/20/2024     8:10 AM   TXP LVAD INTERROGATIONS   Type HeartMate3 HeartMate3 HeartMate3 HeartMate3 HeartMate3 HeartMate3 HeartMate3   Flow 4.1 3.9 4 5.7 4 4.2 4.2   Speed 5100 5100 5100 5100 5100 5100 5100   PI 4.7 6 6.1 6.8 5.7 5.4 5.7   Power (Serna) 3.7 3.7 3.7 3.6 3.7 3.6 3.8   LSL 4700    4700 4700 4700   Pulsatility Intermittent pulse    Intermittent pulse Intermittent pulse          HTN (hypertension)  -Goal MAP 60-90  -Was consistently above 90 yesterday and Norvasc started     Anemia of chronic disease  -H/H stable    Seizure-like activity  -restarted home Keppra dose 1500 BID  -no seizure activity this hospital stay    CHF (NYHA class IV, ACC/AHA stage D)  -NICM  -see above     Infected defibrillator now s/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    Chronic anticoagulation  -INR 1.9 on admit  -Continue warfarin        DEYA Martinez  Heart Transplant  [facility] - [facility] Step[facility]

## 2024-04-21 NOTE — PLAN OF CARE
Plan of care discussed with patient and family.  Patient ambulating independently, fall precautions in place. No falls or injuries throughout  the shift. LVAD DP and numbers WNL, smooth LVAD hum. No LFA. Discussed medications and care. Infusing Milrinone gtts @ 0.25 mcg/kg/min. Insulin gtts @1.3 units/hr continued per protocol along with Q 4 POCT bG checks and SSI. K of 2.8, replaced with 120 mEq PO. Patient has no questions at this time.     Problem: Adult Inpatient Plan of Care  Goal: Plan of Care Review  Outcome: Ongoing, Progressing  Goal: Patient-Specific Goal (Individualized)  Outcome: Ongoing, Progressing  Goal: Absence of Hospital-Acquired Illness or Injury  Outcome: Ongoing, Progressing  Goal: Optimal Comfort and Wellbeing  Outcome: Ongoing, Progressing  Goal: Readiness for Transition of Care  Outcome: Ongoing, Progressing     Problem: Infection  Goal: Absence of Infection Signs and Symptoms  Outcome: Ongoing, Progressing     Problem: Diabetes Comorbidity  Goal: Blood Glucose Level Within Targeted Range  Outcome: Ongoing, Progressing     Problem: Fall Injury Risk  Goal: Absence of Fall and Fall-Related Injury  Outcome: Ongoing, Progressing     Problem: Adjustment to Illness (Heart Failure)  Goal: Optimal Coping  Outcome: Ongoing, Progressing     Problem: Cardiac Output Decreased (Heart Failure)  Goal: Optimal Cardiac Output  Outcome: Ongoing, Progressing     Problem: Dysrhythmia (Heart Failure)  Goal: Stable Heart Rate and Rhythm  Outcome: Ongoing, Progressing     Problem: Fluid Imbalance (Heart Failure)  Goal: Fluid Balance  Outcome: Ongoing, Progressing     Problem: Functional Ability Impaired (Heart Failure)  Goal: Optimal Functional Ability  Outcome: Ongoing, Progressing     Problem: Oral Intake Inadequate (Heart Failure)  Goal: Optimal Nutrition Intake  Outcome: Ongoing, Progressing     Problem: Respiratory Compromise (Heart Failure)  Goal: Effective Oxygenation and Ventilation  Outcome: Ongoing,  Progressing     Problem: Sleep Disordered Breathing (Heart Failure)  Goal: Effective Breathing Pattern During Sleep  Outcome: Ongoing, Progressing     Problem: Adjustment to Device (Ventricular Assist Device)  Goal: Optimal Adjustment to Device  Outcome: Ongoing, Progressing     Problem: Bleeding (Ventricular Assist Device)  Goal: Absence of Bleeding  Outcome: Ongoing, Progressing     Problem: Embolism (Ventricular Assist Device)  Goal: Absence of Embolism Signs and Symptoms  Outcome: Ongoing, Progressing     Problem: Hemodynamic Instability (Ventricular Assist Device)  Goal: Optimal Blood Flow  Outcome: Ongoing, Progressing     Problem: Infection (Ventricular Assist Device)  Goal: Absence of Infection Signs and Symptoms  Outcome: Ongoing, Progressing     Problem: Right-Sided Heart Compromise (Ventricular Assist Device)  Goal: Effective Right-Sided Heart Function  Outcome: Ongoing, Progressing     Problem: Skin Injury Risk Increased  Goal: Skin Health and Integrity  Outcome: Ongoing, Progressing

## 2024-04-21 NOTE — SUBJECTIVE & OBJECTIVE
Interval History: patient with no new complaints today.  Diuresed well on IV Lasix and is now on po.  Although he refused am dose today.  PICC line replaced yesterday.  HE is net negative 2.8L in the last 24 hours.  CVP: 12, SVO2: 54, CO: 5.68, CI: 2.74, SVR: 1098.  INR is therapeutic at 2.2.  He was started on Norvasc for BP control.  Remains on insulin drip and Endocrine following.  Plan to discharge once off insulin infusion and glucose is controlled.     Continuous Infusions:  Current Facility-Administered Medications   Medication Dose Route Frequency Last Rate Last Admin    dextrose 5 % and 0.45 % NaCl   Intravenous Continuous PRN        insulin regular 1 units/mL infusion orderable (TRANSFER)  1.3 Units/hr Intravenous Continuous 1.3 mL/hr at 04/21/24 0837 1.3 Units/hr at 04/21/24 0837    milrinone 20mg/100ml D5W (200mcg/ml)  0.25 mcg/kg/min Intravenous Continuous 7.1 mL/hr at 04/21/24 0837 0.25 mcg/kg/min at 04/21/24 0837     Scheduled Meds:  Current Facility-Administered Medications   Medication Dose Route Frequency    amLODIPine  5 mg Oral Daily    aspirin  81 mg Oral Daily    atorvastatin  40 mg Oral Daily    furosemide  40 mg Oral Daily    gabapentin  300 mg Oral Daily    insulin aspart U-100  10 Units Subcutaneous TIDWM    levETIRAcetam  1,500 mg Oral BID    magnesium oxide  400 mg Oral Daily    mirtazapine  15 mg Oral Nightly    mupirocin   Nasal BID    pantoprazole  40 mg Oral Daily    potassium chloride  40 mEq Oral Daily    sodium chloride 0.9%  10 mL Intravenous Q6H    spironolactone  25 mg Oral Daily    warfarin  5 mg Oral Daily     PRN Meds:  Current Facility-Administered Medications:     acetaminophen, 650 mg, Oral, Q6H PRN    cyclobenzaprine, 5 mg, Oral, TID PRN    dextrose 10%, 12.5 g, Intravenous, PRN    dextrose 10%, 25 g, Intravenous, PRN    dextrose 5 % and 0.45 % NaCl, , Intravenous, Continuous PRN    glucagon (human recombinant), 1 mg, Intramuscular, PRN    glucose, 16 g, Oral, PRN     glucose, 24 g, Oral, PRN    insulin aspart U-100, 0-10 Units, Subcutaneous, AC + HS + 0200 PRN    ondansetron, 4 mg, Oral, Q8H PRN    sodium chloride 0.9%, 10 mL, Intravenous, PRN    Flushing PICC/Midline Protocol, , , Until Discontinued **AND** sodium chloride 0.9%, 10 mL, Intravenous, Q6H **AND** sodium chloride 0.9%, 10 mL, Intravenous, PRN    Review of patient's allergies indicates:   Allergen Reactions    Bumex [bumetanide] Hives    Torsemide Hives     Objective:     Vital Signs (Most Recent):  Temp: 98 °F (36.7 °C) (04/21/24 0801)  Pulse: (!) 113 (04/21/24 0801)  Resp: 16 (04/21/24 0801)  BP: (!) 78/0 (04/21/24 0802)  SpO2: 97 % (04/21/24 0801) Vital Signs (24h Range):  Temp:  [98 °F (36.7 °C)-98.8 °F (37.1 °C)] 98 °F (36.7 °C)  Pulse:  [] 113  Resp:  [16-19] 16  SpO2:  [96 %-100 %] 97 %  BP: ()/(0-89) 78/0     Patient Vitals for the past 72 hrs (Last 3 readings):   Weight   04/21/24 0506 81.1 kg (178 lb 13.4 oz)   04/20/24 0947 81.3 kg (179 lb 3.7 oz)   04/20/24 0528 81.3 kg (179 lb 3.7 oz)     Body mass index is 22.35 kg/m².      Intake/Output Summary (Last 24 hours) at 4/21/2024 1014  Last data filed at 4/21/2024 0841  Gross per 24 hour   Intake 840 ml   Output 2700 ml   Net -1860 ml       Hemodynamic Parameters:       Telemetry: reviewed     Physical Exam  Vitals and nursing note reviewed.   Constitutional:       Appearance: Normal appearance.   HENT:      Head: Normocephalic and atraumatic.      Nose: Nose normal.   Eyes:      Pupils: Pupils are equal, round, and reactive to light.   Neck:      Comments: JVP elevation to mid neck   Cardiovascular:      Rate and Rhythm: Normal rate and regular rhythm.      Comments: Smooth VAD hum  Pulmonary:      Effort: Pulmonary effort is normal.      Breath sounds: Normal breath sounds.   Abdominal:      General: Abdomen is flat. There is no distension.      Palpations: Abdomen is soft.   Musculoskeletal:         General: Normal range of motion.   Skin:      "General: Skin is warm and dry.      Capillary Refill: Capillary refill takes 2 to 3 seconds.   Neurological:      General: No focal deficit present.      Mental Status: He is alert and oriented to person, place, and time.   Psychiatric:         Mood and Affect: Mood normal.         Behavior: Behavior normal.            Significant Labs:  CBC:  Recent Labs   Lab 04/20/24  0209 04/20/24  0222 04/21/24  0519   WBC 6.23  --  7.34   RBC 4.87  --  4.87   HGB 8.3*  --  8.1*   HCT 29.5* 36 29.8*     --  289   MCV 61*  --  61*   MCH 17.0*  --  16.6*   MCHC 28.1*  --  27.2*     BNP:  No results for input(s): "BNP" in the last 168 hours.    Invalid input(s): "BNPTRIAGELBLO"  CMP:  Recent Labs   Lab 04/20/24  0209 04/20/24  0600 04/20/24  1802 04/21/24  0812   * 680* 138* 81   CALCIUM 9.0 8.8 9.0 8.7   ALBUMIN 3.1*  --  2.9* 2.7*   PROT 8.0  --  7.6 7.2   * 123* 131* 132*   K 4.3 3.9 3.1* 2.8*   CO2 18* 20* 25 26   CL 95 92* 95 98   BUN 11 12 11 8   CREATININE 1.4 1.3 1.0 0.9   ALKPHOS 183*  --  176* 167*   ALT 18  --  15 13   AST 32  --  35 39   BILITOT 3.0*  --  2.4* 2.4*      Coagulation:   Recent Labs   Lab 04/16/24  0000 04/20/24  0209 04/21/24  0519   INR 3.32 1.9* 2.2*   APTT  --  32.5* 32.5*     LDH:  Recent Labs   Lab 04/20/24  0209 04/21/24  0519   * 294*     Microbiology:  Microbiology Results (last 7 days)       Procedure Component Value Units Date/Time    Blood culture x two cultures. Draw prior to antibiotics. [[id number]] Collected: 04/20/24 0210    Order Status: Completed Specimen: Blood from Peripheral, Hand, Left Updated: 04/21/24 0613     Blood Culture, Routine No Growth to date      No Growth to date    Narrative:      Aerobic and anaerobic    Blood culture x two cultures. Draw prior to antibiotics. [[id number]] Collected: 04/20/24 0209    Order Status: Completed Specimen: Blood from Peripheral, Antecubital, Left Updated: 04/21/24 0613     Blood Culture, Routine No Growth to date "      No Growth to date    Narrative:      Aerobic and anaerobic            I have reviewed all pertinent labs within the past 24 hours.    Estimated Creatinine Clearance: 126.4 mL/min (based on SCr of 0.9 mg/dL).    Diagnostic Results:  I have reviewed all pertinent imaging results/findings within the past 24 hours.

## 2024-04-21 NOTE — NURSING
Pt.'s CMP resulted for this am, K of 2.8. Provider Angela Fregoso notified. PO replacements 80 mEq of K added. Primary team and Endocrinology team made aware of the K levels and insulin gtts.   Ok to continue insulin gtts @1.3 units/hr. No new orders added.

## 2024-04-21 NOTE — PROGRESS NOTES
Admit Note     Met with pt to assess needs. Patient is a 39 y.o. single male, admitted for Acute Decompensated Heart Failure.     Pt received LVAD on 6/23/22. The pt's significant other does the dressing changes.      Patient admitted to [facility] on 4/20/2024.  At this time, pt presents aaox4 with pleasant affect. No caregivers present at time of visit.    Household/Family Systems     Patient resides with significant other, and three children at    [address] [place] [address].      2 hours from [facility]    Pt cell: [phone]    Additional contact:   Niharika Wright (significant other, will start work soon and drives) [phone]    Support system includes significant other, mother and children. The pt has seven children, two with the pt's significant other. The pt's other children live with their mothers. Pt reports family/mother care for children in the home when the pt and significant other are not in attendance.      Patients primary caregiver is self with support from significant other when indicated.    Pt's caregivers/family will visit if possible.    Confirmed patient and patients caregivers do have access to reliable transportation. Pt and significant other both drive.    Cognitive Status/Learning     Patient reports reading ability as college and states patient does not have difficulty with reading, writing, hearing, comprehension, learning, and memory.  Pt reports he continues to have the same difficulty with his eyesight.  Pt reports his eyesight issues do not effect his ability to drive. Patient reports he learns best by multiple methods.   Needed: No.   Highest education level: Attended College/Technical School    Vocation/Disability   .  Working for Income: No  If no, reason not working: medical   Patient used to work as a CNA.  Significant other is currently is working at [employer].   Pt's mother receives monthly income and is able to assist pt financially.   Pt reports he has  applied for disability and and a  is assisting with this process.   Pt reports no difficulty at this time covering the cost of monthly bills.     Adherence     Patient reports a high level of adherence to patients health care regimen. Pt reports he is a vegan. Adherence counseling and education provided. Patient verbalizes understanding.    Substance Use    Patient caregiver confirmed the following substance usage.    Tobacco: none, patient denies any use.  Alcohol: none, patient denies any use.  Illicit Drugs/Non-prescribed Medications: no current use. Pt does have a history of drug use. Pt stopped using Marijuana on 2021, cocaine on 2021 and ecstasy on 2020.    Patient states clear understanding of the potential impact of substance use.  Substance abstinence/cessation counseling, education and resources provided and reviewed.     Services Utilizing/ADLS    Infusion Service: Prior to admission, patient utilizing? yes [facility] for home Milrinone.  Pt's significant other Page does the IV dressing changes.   Pt would like to use the same IV company when discharged. [phone], fax [phone]    Home Health: Prior to admission, patient utilizing? no    DME: Prior to admission, no    Pulmonary/Cardiac Rehab: Prior to admission, no    Dialysis:  Prior to admission, no    Transplant Specialty Pharmacy:  Prior to admission, no.    Prior to admission, patient's caregiver reports patient was independent with ADLS and was driving.  Patient reports patient is able to care for self at this time..  Patient indicates a willingness to care for self once medically cleared to do so.    Insurance/Medications    Insured by   Payer/Plan Subscr  Sex Relation Sub. Ins. ID Effective Group Num   1. MEDICAID - HE* MOOKIEJOSE J 1985 Male Self [id number]* 3/1/20 [id number]                                   [address] [address]      Primary Insurance (for UNOS reporting): Public Insurance -  Medicaid  Secondary Insurance (for UNOS reporting): None    Patient caregiver reports patient is able to obtain and afford medications at this time and at time of discharge. Pt reports his monthly medications are running about $8.00    Living Will/Healthcare Power of     Patient's caregiver confirmed patient has a LW and/or HCPA.  Pt reports his mother is his HCPA.   provided education regarding LW and HCPA and the completion of forms.    Coping/Mental Health    Per pt, pt is coping adequately with the aid of  family members. Patient indicated mental ailyn difficulties in the past. Pt reports a history of depression, anxiety and anger and is taking Buspar. 4/21/24 - Pt states the depression is always there but it's not bad. Pt reports no needs or concerns at this time and hopes to be able to go home soon.      Discharge Planning    At time of discharge, patient plans to return to patient's home under the care of self, significant other and mother.  Patients significant other will transport patient.  Per rounds today, expected discharge date has not been medically determined at this time. Patient verbalizes understanding and is involved in treatment planning and discharge process.    Additional Concerns    Patient is being followed for needs, education, resources, information, emotional support, supportive counseling, and for supportive and skilled discharge plan of care.  providing ongoing psychosocial support, education, resources and d/c planning as needed.  SW remains available.  provided resource list, patient choice, psychosocial and supportive counseling, resources, education, assistance and discharge planning with patient and caregiver involvement, ongoing SW availability and services as appropriate.  remains available. Patient denies additional needs and/or concerns at this time. Patient verbalizes understanding and agreement with information  reviewed, social work availability, and how to access available resources as needed.

## 2024-04-21 NOTE — CARE UPDATE
"RAPID RESPONSE NURSE CHART REVIEW        Chart Reviewed: 04/21/2024, 3:45 PM    MRN: [id number]  Bed: 315/315 A    Dx: Acute on chronic combined systolic and diastolic heart failure    Kevan Queen has a past medical history of Acute respiratory failure with hypoxia, Arthritis, Awareness alteration, transient, Cardiomyopathy, CHF (congestive heart failure), COVID-19, Diabetes mellitus, Dilated cardiomyopathy, Drug abuse, Headache, Hyperglycemia, Hyperosmolar hyperglycemic state (HHS), ICD (implantable cardioverter-defibrillator) in place, Infected defibrillator now s/p removal Nov 2023, Left ventricular assist device (LVAD) complication, initial encounter, Muscle cramping, Renal disorder, SOB (shortness of breath), and Tingling in extremities.    Last VS: BP 97/76 (BP Location: Right arm, Patient Position: Lying)   Pulse (!) 119   Temp 97.7 °F (36.5 °C) (Oral)   Resp 19   Ht 6' 3" (1.905 m)   Wt 81.1 kg (178 lb 13.4 oz)   SpO2 100%   BMI 22.35 kg/m²     24H Vital Sign Range:  Temp:  [97.7 °F (36.5 °C)-98.8 °F (37.1 °C)]   Pulse:  []   Resp:  [16-19]   BP: ()/(0-80)   SpO2:  [96 %-100 %]     Level of Consciousness (AVPU): alert    Recent Labs     04/20/24  0209 04/20/24  0222 04/21/24  0519   WBC 6.23  --  7.34   HGB 8.3*  --  8.1*   HCT 29.5* 36 29.8*     --  289       Recent Labs     04/20/24  0209 04/20/24  0600 04/20/24  1802 04/21/24  0519 04/21/24  0812   * 123* 131*  --  132*   K 4.3 3.9 3.1*  --  2.8*   CL 95 92* 95  --  98   CO2 18* 20* 25  --  26   BUN 11 12 11  --  8   CREATININE 1.4 1.3 1.0  --  0.9   * 680* 138*  --  81   PHOS 2.2* 2.3* 1.8* 1.9*  --    MG 1.9  --   --  2.0  --         Recent Labs     04/20/24  0223 04/21/24  0524   PH 7.377 7.411   PCO2 33.7* 44.8   PO2 49 29*   HCO3 19.8* 28.4*   POCSATURATED 84 54   BE -5* 4*        OXYGEN:             MEWS score: 3    Bedside RNWilder Patient will get labs at 1600.  No additional concerns verbalized " at this time. Instructed to call [phone] for further concerns or assistance.    Jeane Castillo RN

## 2024-04-21 NOTE — PROGRESS NOTES
04/21/24 1541        VAD 06/29/22 1115 Left ventricular assist device HeartMate 3   Placement Date/Time: 06/29/22 1115   Present Prior to Hospital Arrival?: No  Inserted by: MD  VAD Type: Left ventricular assist device  VAD Brand: HeartMate 3   Site Location Abdomen right   Site Assessment Clean;Dry;Intact   Driveline Exit Site 1   Driveline Assessment Free of Kinks;Intact;Modular cable Clean and Locked   Dressing Status Clean;Dry;Intact   Dressing Intervention Sterile dressing change   Performed By RN   Dressing Change Schedule Daily   Dressing Change Due 04/22/24   Integrity dry;intact   Driveline [place] in use Tape   Condition CDI     LVAD dressing change completed using sterile technique with daily LVAD kit. DLES is a 1 with no drainage noted on the drain sponge. Tolerated without any complication. Driveline remains intact, no redness, or tenderness noted.

## 2024-04-22 ENCOUNTER — PATIENT MESSAGE (OUTPATIENT)
Dept: TRANSPLANT | Facility: CLINIC | Age: 39
End: 2024-04-22
Payer: MEDICAID

## 2024-04-22 LAB
ALBUMIN SERPL BCP-MCNC: 2.5 G/DL (ref 3.5–5.2)
ALLENS TEST: ABNORMAL
ALP SERPL-CCNC: 170 U/L (ref 55–135)
ALT SERPL W/O P-5'-P-CCNC: 17 U/L (ref 10–44)
ANION GAP SERPL CALC-SCNC: 6 MMOL/L (ref 8–16)
APTT PPP: 34.3 SEC (ref 21–32)
AST SERPL-CCNC: 43 U/L (ref 10–40)
BASOPHILS # BLD AUTO: 0.02 K/UL (ref 0–0.2)
BASOPHILS NFR BLD: 0.3 % (ref 0–1.9)
BILIRUB DIRECT SERPL-MCNC: 1.4 MG/DL (ref 0.1–0.3)
BILIRUB SERPL-MCNC: 2 MG/DL (ref 0.1–1)
BNP SERPL-MCNC: 331 PG/ML (ref 0–99)
BUN SERPL-MCNC: 10 MG/DL (ref 6–20)
CALCIUM SERPL-MCNC: 8.7 MG/DL (ref 8.7–10.5)
CHLORIDE SERPL-SCNC: 102 MMOL/L (ref 95–110)
CO2 SERPL-SCNC: 25 MMOL/L (ref 23–29)
CREAT SERPL-MCNC: 1 MG/DL (ref 0.5–1.4)
CRP SERPL-MCNC: 16.2 MG/L (ref 0–8.2)
DELSYS: ABNORMAL
DIFFERENTIAL METHOD BLD: ABNORMAL
EOSINOPHIL # BLD AUTO: 0.1 K/UL (ref 0–0.5)
EOSINOPHIL NFR BLD: 0.9 % (ref 0–8)
ERYTHROCYTE [DISTWIDTH] IN BLOOD BY AUTOMATED COUNT: 25 % (ref 11.5–14.5)
EST. GFR  (NO RACE VARIABLE): >60 ML/MIN/1.73 M^2
GLUCOSE SERPL-MCNC: 241 MG/DL (ref 70–110)
HCO3 UR-SCNC: 27.2 MMOL/L (ref 24–28)
HCT VFR BLD AUTO: 28.4 % (ref 40–54)
HGB BLD-MCNC: 7.8 G/DL (ref 14–18)
IMM GRANULOCYTES # BLD AUTO: 0.02 K/UL (ref 0–0.04)
IMM GRANULOCYTES NFR BLD AUTO: 0.3 % (ref 0–0.5)
INR PPP: 2.7 (ref 0.8–1.2)
LDH SERPL L TO P-CCNC: 263 U/L (ref 110–260)
LYMPHOCYTES # BLD AUTO: 1.5 K/UL (ref 1–4.8)
LYMPHOCYTES NFR BLD: 25 % (ref 18–48)
MAGNESIUM SERPL-MCNC: 1.8 MG/DL (ref 1.6–2.6)
MAGNESIUM SERPL-MCNC: 2 MG/DL (ref 1.6–2.6)
MCH RBC QN AUTO: 16.7 PG (ref 27–31)
MCHC RBC AUTO-ENTMCNC: 27.5 G/DL (ref 32–36)
MCV RBC AUTO: 61 FL (ref 82–98)
MODE: ABNORMAL
MONOCYTES # BLD AUTO: 0.7 K/UL (ref 0.3–1)
MONOCYTES NFR BLD: 12.1 % (ref 4–15)
NEUTROPHILS # BLD AUTO: 3.6 K/UL (ref 1.8–7.7)
NEUTROPHILS NFR BLD: 61.4 % (ref 38–73)
NRBC BLD-RTO: 0 /100 WBC
PCO2 BLDA: 45.9 MMHG (ref 35–45)
PH SMN: 7.38 [PH] (ref 7.35–7.45)
PHOSPHATE SERPL-MCNC: 2.1 MG/DL (ref 2.7–4.5)
PHOSPHATE SERPL-MCNC: 3.3 MG/DL (ref 2.7–4.5)
PLATELET # BLD AUTO: 269 K/UL (ref 150–450)
PMV BLD AUTO: ABNORMAL FL (ref 9.2–12.9)
PO2 BLDA: 26 MMHG (ref 40–60)
POC BE: 2 MMOL/L
POC SATURATED O2: 46 % (ref 95–100)
POC TCO2: 29 MMOL/L (ref 24–29)
POCT GLUCOSE: 148 MG/DL (ref 70–110)
POCT GLUCOSE: 156 MG/DL (ref 70–110)
POCT GLUCOSE: 163 MG/DL (ref 70–110)
POCT GLUCOSE: 196 MG/DL (ref 70–110)
POCT GLUCOSE: 288 MG/DL (ref 70–110)
POTASSIUM SERPL-SCNC: 3.8 MMOL/L (ref 3.5–5.1)
POTASSIUM SERPL-SCNC: 4.1 MMOL/L (ref 3.5–5.1)
PREALB SERPL-MCNC: 9 MG/DL (ref 20–43)
PROT SERPL-MCNC: 6.8 G/DL (ref 6–8.4)
PROTHROMBIN TIME: 27.9 SEC (ref 9–12.5)
RBC # BLD AUTO: 4.67 M/UL (ref 4.6–6.2)
SAMPLE: ABNORMAL
SITE: ABNORMAL
SODIUM SERPL-SCNC: 133 MMOL/L (ref 136–145)
SP02: 100
WBC # BLD AUTO: 5.87 K/UL (ref 3.9–12.7)

## 2024-04-22 PROCEDURE — 85025 COMPLETE CBC W/AUTO DIFF WBC: CPT | Performed by: INTERNAL MEDICINE

## 2024-04-22 PROCEDURE — 82803 BLOOD GASES ANY COMBINATION: CPT

## 2024-04-22 PROCEDURE — 20600001 HC STEP DOWN PRIVATE ROOM

## 2024-04-22 PROCEDURE — 80076 HEPATIC FUNCTION PANEL: CPT | Performed by: INTERNAL MEDICINE

## 2024-04-22 PROCEDURE — 25000003 PHARM REV CODE 250: Performed by: INTERNAL MEDICINE

## 2024-04-22 PROCEDURE — 25000003 PHARM REV CODE 250: Performed by: STUDENT IN AN ORGANIZED HEALTH CARE EDUCATION/TRAINING PROGRAM

## 2024-04-22 PROCEDURE — 27000248 HC VAD-ADDITIONAL DAY

## 2024-04-22 PROCEDURE — 83880 ASSAY OF NATRIURETIC PEPTIDE: CPT | Performed by: INTERNAL MEDICINE

## 2024-04-22 PROCEDURE — 99900035 HC TECH TIME PER 15 MIN (STAT)

## 2024-04-22 PROCEDURE — 80048 BASIC METABOLIC PNL TOTAL CA: CPT | Performed by: STUDENT IN AN ORGANIZED HEALTH CARE EDUCATION/TRAINING PROGRAM

## 2024-04-22 PROCEDURE — 94761 N-INVAS EAR/PLS OXIMETRY MLT: CPT | Mod: XB

## 2024-04-22 PROCEDURE — 85610 PROTHROMBIN TIME: CPT | Performed by: INTERNAL MEDICINE

## 2024-04-22 PROCEDURE — A4216 STERILE WATER/SALINE, 10 ML: HCPCS | Performed by: INTERNAL MEDICINE

## 2024-04-22 PROCEDURE — 99232 SBSQ HOSP IP/OBS MODERATE 35: CPT | Mod: ,,, | Performed by: NURSE PRACTITIONER

## 2024-04-22 PROCEDURE — 63600175 PHARM REV CODE 636 W HCPCS: Performed by: INTERNAL MEDICINE

## 2024-04-22 PROCEDURE — 84100 ASSAY OF PHOSPHORUS: CPT | Performed by: INTERNAL MEDICINE

## 2024-04-22 PROCEDURE — 83615 LACTATE (LD) (LDH) ENZYME: CPT | Performed by: INTERNAL MEDICINE

## 2024-04-22 PROCEDURE — 86140 C-REACTIVE PROTEIN: CPT | Performed by: INTERNAL MEDICINE

## 2024-04-22 PROCEDURE — 25000003 PHARM REV CODE 250: Performed by: PHYSICIAN ASSISTANT

## 2024-04-22 PROCEDURE — 99233 SBSQ HOSP IP/OBS HIGH 50: CPT | Mod: ,,, | Performed by: INTERNAL MEDICINE

## 2024-04-22 PROCEDURE — 83735 ASSAY OF MAGNESIUM: CPT | Mod: 91 | Performed by: STUDENT IN AN ORGANIZED HEALTH CARE EDUCATION/TRAINING PROGRAM

## 2024-04-22 PROCEDURE — 84100 ASSAY OF PHOSPHORUS: CPT | Mod: 91 | Performed by: INTERNAL MEDICINE

## 2024-04-22 PROCEDURE — 83735 ASSAY OF MAGNESIUM: CPT | Performed by: INTERNAL MEDICINE

## 2024-04-22 PROCEDURE — 85730 THROMBOPLASTIN TIME PARTIAL: CPT | Performed by: INTERNAL MEDICINE

## 2024-04-22 PROCEDURE — 93750 INTERROGATION VAD IN PERSON: CPT | Mod: ,,, | Performed by: INTERNAL MEDICINE

## 2024-04-22 PROCEDURE — 84134 ASSAY OF PREALBUMIN: CPT | Performed by: INTERNAL MEDICINE

## 2024-04-22 PROCEDURE — 25000003 PHARM REV CODE 250: Performed by: NURSE PRACTITIONER

## 2024-04-22 PROCEDURE — 63600175 PHARM REV CODE 636 W HCPCS: Performed by: STUDENT IN AN ORGANIZED HEALTH CARE EDUCATION/TRAINING PROGRAM

## 2024-04-22 PROCEDURE — 84132 ASSAY OF SERUM POTASSIUM: CPT | Performed by: STUDENT IN AN ORGANIZED HEALTH CARE EDUCATION/TRAINING PROGRAM

## 2024-04-22 RX ORDER — INSULIN ASPART 100 [IU]/ML
14 INJECTION, SOLUTION INTRAVENOUS; SUBCUTANEOUS
Status: DISCONTINUED | OUTPATIENT
Start: 2024-04-22 | End: 2024-04-23

## 2024-04-22 RX ORDER — FUROSEMIDE 10 MG/ML
80 INJECTION INTRAMUSCULAR; INTRAVENOUS ONCE
Status: COMPLETED | OUTPATIENT
Start: 2024-04-22 | End: 2024-04-22

## 2024-04-22 RX ORDER — BACLOFEN 5 MG/1
5 TABLET ORAL ONCE
Status: COMPLETED | OUTPATIENT
Start: 2024-04-22 | End: 2024-04-22

## 2024-04-22 RX ORDER — POTASSIUM CHLORIDE 20 MEQ/1
40 TABLET, EXTENDED RELEASE ORAL ONCE
Status: COMPLETED | OUTPATIENT
Start: 2024-04-22 | End: 2024-04-22

## 2024-04-22 RX ORDER — MAGNESIUM SULFATE HEPTAHYDRATE 40 MG/ML
2 INJECTION, SOLUTION INTRAVENOUS ONCE
Status: COMPLETED | OUTPATIENT
Start: 2024-04-22 | End: 2024-04-22

## 2024-04-22 RX ORDER — WARFARIN 2.5 MG/1
2.5 TABLET ORAL DAILY
Status: DISCONTINUED | OUTPATIENT
Start: 2024-04-22 | End: 2024-04-23

## 2024-04-22 RX ADMIN — WARFARIN SODIUM 2.5 MG: 2.5 TABLET ORAL at 04:04

## 2024-04-22 RX ADMIN — LEVETIRACETAM 1500 MG: 500 TABLET, FILM COATED ORAL at 08:04

## 2024-04-22 RX ADMIN — INSULIN ASPART 6 UNITS: 100 INJECTION, SOLUTION INTRAVENOUS; SUBCUTANEOUS at 06:04

## 2024-04-22 RX ADMIN — FUROSEMIDE 80 MG: 10 INJECTION, SOLUTION INTRAVENOUS at 09:04

## 2024-04-22 RX ADMIN — Medication 10 ML: at 06:04

## 2024-04-22 RX ADMIN — Medication 10 ML: at 05:04

## 2024-04-22 RX ADMIN — BACLOFEN 5 MG: 5 TABLET ORAL at 09:04

## 2024-04-22 RX ADMIN — MILRINONE LACTATE IN DEXTROSE 0.25 MCG/KG/MIN: 200 INJECTION, SOLUTION INTRAVENOUS at 03:04

## 2024-04-22 RX ADMIN — ASPIRIN 81 MG: 81 TABLET, COATED ORAL at 08:04

## 2024-04-22 RX ADMIN — INSULIN ASPART 14 UNITS: 100 INJECTION, SOLUTION INTRAVENOUS; SUBCUTANEOUS at 05:04

## 2024-04-22 RX ADMIN — MAGNESIUM SULFATE HEPTAHYDRATE 2 G: 40 INJECTION, SOLUTION INTRAVENOUS at 08:04

## 2024-04-22 RX ADMIN — LEVETIRACETAM 1500 MG: 500 TABLET, FILM COATED ORAL at 09:04

## 2024-04-22 RX ADMIN — Medication 400 MG: at 08:04

## 2024-04-22 RX ADMIN — SPIRONOLACTONE 25 MG: 25 TABLET ORAL at 08:04

## 2024-04-22 RX ADMIN — POTASSIUM CHLORIDE 40 MEQ: 1500 TABLET, EXTENDED RELEASE ORAL at 04:04

## 2024-04-22 RX ADMIN — INSULIN ASPART 14 UNITS: 100 INJECTION, SOLUTION INTRAVENOUS; SUBCUTANEOUS at 12:04

## 2024-04-22 RX ADMIN — ATORVASTATIN CALCIUM 40 MG: 20 TABLET, FILM COATED ORAL at 08:04

## 2024-04-22 RX ADMIN — INSULIN DETEMIR 34 UNITS: 100 INJECTION, SOLUTION SUBCUTANEOUS at 09:04

## 2024-04-22 RX ADMIN — PANTOPRAZOLE SODIUM 40 MG: 40 TABLET, DELAYED RELEASE ORAL at 08:04

## 2024-04-22 RX ADMIN — Medication 10 ML: at 02:04

## 2024-04-22 RX ADMIN — MUPIROCIN: 20 OINTMENT TOPICAL at 09:04

## 2024-04-22 RX ADMIN — INSULIN ASPART 10 UNITS: 100 INJECTION, SOLUTION INTRAVENOUS; SUBCUTANEOUS at 08:04

## 2024-04-22 RX ADMIN — AMLODIPINE BESYLATE 5 MG: 5 TABLET ORAL at 08:04

## 2024-04-22 RX ADMIN — GABAPENTIN 300 MG: 300 CAPSULE ORAL at 08:04

## 2024-04-22 NOTE — PROGRESS NOTES
Pt asleeep, but woke when I entered.  No family at bedside.  No alarms noted except many low voltage alarms.  No questions or needs from me at this time.

## 2024-04-22 NOTE — PROGRESS NOTES
Patient was seen today in the hospital.  Patient awake at time of visit. No family at bedside. Patient's equipment inspected and is in working condition. Patient brought in his MPU and UBC from home. MPU and UBC maintenance completed. Driveline inspected, no kinks or tears seen. Equipment care and cleaning reviewed with patient. Patient has no other equipment needs at this time.        Equipment:  Any Equipment Issues: None noted     Emergency Bag  Emergency Equipment With Patient: Yes  Condition of emergency bag: Good  Contents of emergency bag: Backup controller, 2 fully charged batteries, 2 battery clips, reference cards    Maintenance Tracking  Patient has monthly checklist today: No  Patient correctly utilizing monthly checklist: No  Educated patient on utilizing monthly checklist correctly and importance of this: Yes    Equipment Inspection  Inspected patient's equipment today: Yes  Equipment clean and free of debris, including locking mechanism: Yes  Cleaned equipment today and educated patient on how to do this: Yes    Manual and visual inspection of driveline: Yes  Kinks, rescue tape, tears: No  Rescue tape applied: No  Clamshell repair: No  Perc lead repair: No    Mobile Power Unit  Mobile power unit serial number: [id number]  MPU maintenance due: 10/2024  MPU maintenance completed today:  Yes  Mobile Power Unit 6 month maintenance and AA battery replacement complete. MPU, MPU patient cable and AC power cord inspected for damage and noted to be in good condition.    Universal Battery Charger  [facility] serial number: [id number]  UBC maintenance due:4/2025  UBC maintenance completed today Yes    Batteries  Batteries checked for calibration: Yes  Batteries calibrated today: No  Which batteries: None  Educated patient on how and why to calibrate batteries: Yes    It is medically necessary to ensure patient has properly functioning equipment and wearables to prevent infection, injury or death to patient.   Waveforms  sent: No

## 2024-04-22 NOTE — ASSESSMENT & PLAN NOTE
-NICM s/p HM 3 on home Milrinone   -Last 2D Echo  9/5/23 : LVEF 10-15%, LVEDD  7.11 cm with speed at 5100  -Last RHC: 10/31/22 with speed at 5100 on Milrinone 0.125 RA: 18, RV: 35/7 (22), PAP: 35/19 (24), PWP: 21, CO: 3.55, CI: 1.6  -Hypervolemic on examination today  -Give Lasix 80mg IVP today.   -GDMT with N/A  -2g Na dietary restriction, 1500 mL fluid restriction, strict I/Os

## 2024-04-22 NOTE — PROGRESS NOTES
[facility] - Cardiology Stepdown  Endocrinology  Progress Note    Admit Date: 4/20/2024     Reason for Consult: Management of T2DM, Hyperglycemia      Surgical Procedure and Date: LVAD 06/29/2022     Diabetes diagnosis year: 2022     Home Diabetes Medications:   -Levemir 30 units QD   -Novolog 20 units TIDWM in addition to the following correction scale:    150 - 200 + 2 unit    201 - 250 + 4 units    251 - 300 + 6 units    301 - 350 + 8 units       > 350   + 10 units     How often checking glucose at home?  > 4 times per day  BG readings on regimen: 180's-200's  Hypoglycemia on the regimen?  No  Missed doses on regimen?  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 He underwent RVAD removal and chest closure 6/30/2022.  He also completed a course of IV Abx for staph epi. He was weaned off  but he had to restarted due to RVF. He was eventually transitioned to milrinone (secondary to  shortage) now on 0.25 mcg/kg/min. He has a history of unclear syncope vs seizures and is followed by neuro for this and is on Keppra.   On 11/15/23 underwent removal of eroded [employer] scICD--no Lifevest (due to LVAD) and no plan to replace ICD as not requiring therapy post LVAD with concern for reinfection.  He has not had neurology f/u with seizure hx on keppra so coordinator to assist him with obtaining appt.  Patient presents for concerns for fever, vomiting and pain and concerns of driveline infection, low INR and hyperglycemia. Beta hydroxybutyrate  "negative. Lactic acid negative. Normal WBC count and CT Abd/Pelv with contrast without signs of driveline infection. Had elevated blood glucose 2024 and was advised to go to ED. Also advised to go to ED in the first week of April for fever of 102*F (reported) but did not go at that time. Patient reports that he has been taking lower doses of Lasix at home as compared to what has been prescribed. Exam consistent with hypervolemia. Bedside TTE with IVC 2.5 cm and non collapsible IVC. No inflow outflow cannula issues visualized. Endocrine consulted to manage hyperglycemia and type 2 diabetes.     Lab Results   Component Value Date    HGBA1C >14.0 (H) 2024         Interval HPI:   Overnight events: No acute events overnight. Patient in room 315/315 A. Blood glucose improving. BG at and below goal on current insulin regimen (Transition Insulin Drip). Steroid use- None.    Renal function- Normal   Vasopressors-  None       Endocrine will continue to follow and manage insulin orders inpatient.         Diet diabetic  Calorie '  Eatin%  Nausea: No  Hypoglycemia and intervention: No  Fever: No  TPN and/or TF: No      BP (!) 88/0 (BP Location: Right arm, Patient Position: Lying)   Pulse 109   Temp 97.8 °F (36.6 °C) (Oral)   Resp 18   Ht 6' 3" (1.905 m)   Wt 81.1 kg (178 lb 13.4 oz)   SpO2 98%   BMI 22.35 kg/m²     Labs Reviewed and Include    Recent Labs   Lab 24  0516 24  0749   GLU  --  241*   CALCIUM  --  8.7   ALBUMIN 2.5*  --    PROT 6.8  --    NA  --  133*   K  --  4.1   CO2  --  25   CL  --  102   BUN  --  10   CREATININE  --  1.0   ALKPHOS 170*  --    ALT 17  --    AST 43*  --    BILITOT 2.0*  --      Lab Results   Component Value Date    WBC 5.87 2024    HGB 7.8 (L) 2024    HCT 28.4 (L) 2024    MCV 61 (L) 2024     2024     No results for input(s): "TSH", "FREET4" in the last 168 hours.  Lab Results   Component Value Date    HGBA1C >14.0 (H) " 02/17/2024       Nutritional status:   Body mass index is 22.35 kg/m².  Lab Results   Component Value Date    ALBUMIN 2.5 (L) 04/22/2024    ALBUMIN 2.6 (L) 04/21/2024    ALBUMIN 2.7 (L) 04/21/2024     Lab Results   Component Value Date    PREALBUMIN 9 (L) 04/22/2024    PREALBUMIN 14 (L) 01/03/2024    PREALBUMIN 21 11/15/2023       Estimated Creatinine Clearance: 113.8 mL/min (based on SCr of 1 mg/dL).    Accu-Checks  Recent Labs     04/20/24  2341 04/21/24  0515 04/21/24  0751 04/21/24  0824 04/21/24  1151 04/21/24  1213 04/21/24  1640 04/21/24  2136 04/22/24  0633 04/22/24  0831   POCTGLUCOSE 198* 102 94 110 157* 154* 214* 197* 288* 196*       Current Medications and/or Treatments Impacting Glycemic Control  Immunotherapy:    Immunosuppressants       None          Steroids:   Hormones (From admission, onward)      None          Pressors:    Autonomic Drugs (From admission, onward)      None          Hyperglycemia/Diabetes Medications:   Antihyperglycemics (From admission, onward)      Start     Stop Route Frequency Ordered    04/22/24 1130  insulin aspart U-100 pen 14 Units         -- SubQ 3 times daily with meals 04/22/24 0857    04/20/24 2100  insulin regular in 0.9 % NaCl 100 unit/100 mL (1 unit/mL) infusion        Question:  Enter initial dose (Units/hr):  Answer:  1.7    -- IV Continuous 04/20/24 1947 04/20/24 2046  insulin aspart U-100 pen 0-10 Units         -- SubQ Before meals, nightly and at 0200 PRN 04/20/24 1947            ASSESSMENT and PLAN    Cardiac/Vascular  * Acute on chronic combined systolic and diastolic heart failure  Managed per primary team  Avoid hypoglycemia        LVAD (left ventricular assist device) present  Managed per primary team  Avoid hypoglycemia        Endocrine  Type 2 diabetes mellitus with hyperosmolar hyperglycemic state (HHS)  Endocrinology consulted for BG management.   BG goal 140-180     - Transition drip at 1.7 units/hr with step-down parameters. (30% increase due to  fasting blood glucose above goal)   - Novolog (aspart) insulin 14 Units SQ TIDWM and prn for BG excursions [place] SSI (150/25).  - BG checks /HS/0200  - Hypoglycemia protocol in place    ** Please notify Endocrine for any change and/or advance in diet**  ** Please call Endocrine for any BG related issues **    Discharge Planning:   TBD. Please notify endocrinology prior to discharge.             Michael Wyman, DNP, FNP  Endocrinology  [facility] - Cardiology Stepdown

## 2024-04-22 NOTE — SUBJECTIVE & OBJECTIVE
Interval History: NAEON. No acute complaints. CVP 15 this AM.     Continuous Infusions:  Current Facility-Administered Medications   Medication Dose Route Frequency Last Rate Last Admin    dextrose 5 % and 0.45 % NaCl   Intravenous Continuous PRN        insulin regular 1 units/mL infusion orderable (TRANSFER)  1.7 Units/hr Intravenous Continuous 1.3 mL/hr at 04/21/24 1516 1.3 Units/hr at 04/21/24 1516    milrinone 20mg/100ml D5W (200mcg/ml)  0.25 mcg/kg/min Intravenous Continuous 7.1 mL/hr at 04/21/24 2327 0.25 mcg/kg/min at 04/21/24 2327     Scheduled Meds:  Current Facility-Administered Medications   Medication Dose Route Frequency    amLODIPine  5 mg Oral Daily    aspirin  81 mg Oral Daily    atorvastatin  40 mg Oral Daily    baclofen  5 mg Oral Once    furosemide (LASIX) injection  80 mg Intravenous Once    gabapentin  300 mg Oral Daily    insulin aspart U-100  14 Units Subcutaneous TIDWM    levETIRAcetam  1,500 mg Oral BID    magnesium oxide  400 mg Oral Daily    magnesium sulfate IVPB  2 g Intravenous Once    mirtazapine  15 mg Oral Nightly    mupirocin   Nasal BID    pantoprazole  40 mg Oral Daily    sodium chloride 0.9%  10 mL Intravenous Q6H    spironolactone  25 mg Oral Daily    warfarin  5 mg Oral Daily     PRN Meds:  Current Facility-Administered Medications:     acetaminophen, 650 mg, Oral, Q6H PRN    cyclobenzaprine, 5 mg, Oral, TID PRN    dextrose 10%, 12.5 g, Intravenous, PRN    dextrose 10%, 25 g, Intravenous, PRN    dextrose 5 % and 0.45 % NaCl, , Intravenous, Continuous PRN    glucagon (human recombinant), 1 mg, Intramuscular, PRN    glucose, 16 g, Oral, PRN    glucose, 24 g, Oral, PRN    insulin aspart U-100, 0-10 Units, Subcutaneous, AC + HS + 0200 PRN    ondansetron, 4 mg, Oral, Q8H PRN    sodium chloride 0.9%, 10 mL, Intravenous, PRN    Flushing PICC/Midline Protocol, , , Until Discontinued **AND** sodium chloride 0.9%, 10 mL, Intravenous, Q6H **AND** sodium chloride 0.9%, 10 mL, Intravenous,  PRN    Review of patient's allergies indicates:   Allergen Reactions    Bumex [bumetanide] Hives    Torsemide Hives     Objective:     Vital Signs (Most Recent):  Temp: 97.8 °F (36.6 °C) (04/22/24 0809)  Pulse: 109 (04/22/24 0311)  Resp: 18 (04/22/24 0809)  BP: (!) 88/0 (04/22/24 0835)  SpO2: 98 % (04/22/24 0809) Vital Signs (24h Range):  Temp:  [97.6 °F (36.4 °C)-98.3 °F (36.8 °C)] 97.8 °F (36.6 °C)  Pulse:  [] 109  Resp:  [17-19] 18  SpO2:  [98 %-100 %] 98 %  BP: ()/(0-82) 88/0     Patient Vitals for the past 72 hrs (Last 3 readings):   Weight   04/21/24 0506 81.1 kg (178 lb 13.4 oz)   04/20/24 0947 81.3 kg (179 lb 3.7 oz)   04/20/24 0528 81.3 kg (179 lb 3.7 oz)     Body mass index is 22.35 kg/m².      Intake/Output Summary (Last 24 hours) at 4/22/2024 0929  Last data filed at 4/22/2024 0747  Gross per 24 hour   Intake 1271 ml   Output 2500 ml   Net -1229 ml       Hemodynamic Parameters:       Telemetry: reviewed     Physical Exam  Vitals and nursing note reviewed.   Constitutional:       Appearance: Normal appearance.   HENT:      Head: Normocephalic and atraumatic.      Nose: Nose normal.   Eyes:      Pupils: Pupils are equal, round, and reactive to light.   Neck:      Comments: JVP 14cm  Cardiovascular:      Rate and Rhythm: Normal rate and regular rhythm.      Comments: Smooth VAD hum  Pulmonary:      Effort: Pulmonary effort is normal.      Breath sounds: Normal breath sounds.   Abdominal:      General: Abdomen is flat. There is no distension.      Palpations: Abdomen is soft.   Musculoskeletal:         General: Normal range of motion.   Skin:     General: Skin is warm and dry.      Capillary Refill: Capillary refill takes 2 to 3 seconds.   Neurological:      General: No focal deficit present.      Mental Status: He is alert and oriented to person, place, and time.   Psychiatric:         Mood and Affect: Mood normal.         Behavior: Behavior normal.            Significant Labs:  CBC:  Recent  Labs   Lab 04/21/24  0519 04/21/24  1511 04/22/24  0516   WBC 7.34 6.67 5.87   RBC 4.87 5.34 4.67   HGB 8.1* 8.8* 7.8*   HCT 29.8* 32.3* 28.4*    295 269   MCV 61* 61* 61*   MCH 16.6* 16.5* 16.7*   MCHC 27.2* 27.2* 27.5*     BNP:  Recent Labs   Lab 04/22/24  0516   *     CMP:  Recent Labs   Lab 04/21/24  0812 04/21/24  1700 04/22/24  0516 04/22/24  0749   GLU 81 205*  --  241*   CALCIUM 8.7 8.8  --  8.7   ALBUMIN 2.7* 2.6* 2.5*  --    PROT 7.2 7.3 6.8  --    * 135*  --  133*   K 2.8* 3.6  --  4.1   CO2 26 26  --  25   CL 98 99  --  102   BUN 8 8  --  10   CREATININE 0.9 1.0  --  1.0   ALKPHOS 167* 167* 170*  --    ALT 13 15 17  --    AST 39 44* 43*  --    BILITOT 2.4* 2.5* 2.0*  --       Coagulation:   Recent Labs   Lab 04/20/24  0209 04/21/24  0519 04/22/24  0516   INR 1.9* 2.2* 2.7*   APTT 32.5* 32.5* 34.3*     LDH:  Recent Labs   Lab 04/20/24  0209 04/21/24  0519 04/22/24  0516   * 294* 263*     Microbiology:  Microbiology Results (last 7 days)       Procedure Component Value Units Date/Time    Blood culture x two cultures. Draw prior to antibiotics. [[id number]] Collected: 04/20/24 0210    Order Status: Completed Specimen: Blood from Peripheral, Hand, Left Updated: 04/22/24 0613     Blood Culture, Routine No Growth to date      No Growth to date      No Growth to date    Narrative:      Aerobic and anaerobic    Blood culture x two cultures. Draw prior to antibiotics. [[id number]] Collected: 04/20/24 0209    Order Status: Completed Specimen: Blood from Peripheral, Antecubital, Left Updated: 04/22/24 0612     Blood Culture, Routine No Growth to date      No Growth to date      No Growth to date    Narrative:      Aerobic and anaerobic            I have reviewed all pertinent labs within the past 24 hours.    Estimated Creatinine Clearance: 113.8 mL/min (based on SCr of 1 mg/dL).    Diagnostic Results:  I have reviewed all pertinent imaging results/findings within the past 24 hours.

## 2024-04-22 NOTE — PROGRESS NOTES
[facility] - [facility] Step[facility]  Heart Transplant  Progress Note    Patient Name: Kevan Queen  MRN: [id number]  Admission Date: 4/20/2024  Hospital Length of Stay: 2 days  Attending Physician: Natalya Villatoro MD  Primary Care Provider: Rosio Armendariz FNP  Principal Problem:Acute on chronic combined systolic and diastolic heart failure    Subjective:   Interval History: NAEON. No acute complaints. CVP 15 this AM.     Continuous Infusions:  Current Facility-Administered Medications   Medication Dose Route Frequency Last Rate Last Admin    dextrose 5 % and 0.45 % NaCl   Intravenous Continuous PRN        insulin regular 1 units/mL infusion orderable (TRANSFER)  1.7 Units/hr Intravenous Continuous 1.3 mL/hr at 04/21/24 1516 1.3 Units/hr at 04/21/24 1516    milrinone 20mg/100ml D5W (200mcg/ml)  0.25 mcg/kg/min Intravenous Continuous 7.1 mL/hr at 04/21/24 2327 0.25 mcg/kg/min at 04/21/24 2327     Scheduled Meds:  Current Facility-Administered Medications   Medication Dose Route Frequency    amLODIPine  5 mg Oral Daily    aspirin  81 mg Oral Daily    atorvastatin  40 mg Oral Daily    baclofen  5 mg Oral Once    furosemide (LASIX) injection  80 mg Intravenous Once    gabapentin  300 mg Oral Daily    insulin aspart U-100  14 Units Subcutaneous TIDWM    levETIRAcetam  1,500 mg Oral BID    magnesium oxide  400 mg Oral Daily    magnesium sulfate IVPB  2 g Intravenous Once    mirtazapine  15 mg Oral Nightly    mupirocin   Nasal BID    pantoprazole  40 mg Oral Daily    sodium chloride 0.9%  10 mL Intravenous Q6H    spironolactone  25 mg Oral Daily    warfarin  5 mg Oral Daily     PRN Meds:  Current Facility-Administered Medications:     acetaminophen, 650 mg, Oral, Q6H PRN    cyclobenzaprine, 5 mg, Oral, TID PRN    dextrose 10%, 12.5 g, Intravenous, PRN    dextrose 10%, 25 g, Intravenous, PRN    dextrose 5 % and 0.45 % NaCl, , Intravenous, Continuous PRN    glucagon (human recombinant), 1 mg, Intramuscular, PRN     glucose, 16 g, Oral, PRN    glucose, 24 g, Oral, PRN    insulin aspart U-100, 0-10 Units, Subcutaneous, AC + HS + 0200 PRN    ondansetron, 4 mg, Oral, Q8H PRN    sodium chloride 0.9%, 10 mL, Intravenous, PRN    Flushing PICC/Midline Protocol, , , Until Discontinued **AND** sodium chloride 0.9%, 10 mL, Intravenous, Q6H **AND** sodium chloride 0.9%, 10 mL, Intravenous, PRN    Review of patient's allergies indicates:   Allergen Reactions    Bumex [bumetanide] Hives    Torsemide Hives     Objective:     Vital Signs (Most Recent):  Temp: 97.8 °F (36.6 °C) (04/22/24 0809)  Pulse: 109 (04/22/24 0311)  Resp: 18 (04/22/24 0809)  BP: (!) 88/0 (04/22/24 0835)  SpO2: 98 % (04/22/24 0809) Vital Signs (24h Range):  Temp:  [97.6 °F (36.4 °C)-98.3 °F (36.8 °C)] 97.8 °F (36.6 °C)  Pulse:  [] 109  Resp:  [17-19] 18  SpO2:  [98 %-100 %] 98 %  BP: ()/(0-82) 88/0     Patient Vitals for the past 72 hrs (Last 3 readings):   Weight   04/21/24 0506 81.1 kg (178 lb 13.4 oz)   04/20/24 0947 81.3 kg (179 lb 3.7 oz)   04/20/24 0528 81.3 kg (179 lb 3.7 oz)     Body mass index is 22.35 kg/m².      Intake/Output Summary (Last 24 hours) at 4/22/2024 0929  Last data filed at 4/22/2024 0747  Gross per 24 hour   Intake 1271 ml   Output 2500 ml   Net -1229 ml       Hemodynamic Parameters:       Telemetry: reviewed     Physical Exam  Vitals and nursing note reviewed.   Constitutional:       Appearance: Normal appearance.   HENT:      Head: Normocephalic and atraumatic.      Nose: Nose normal.   Eyes:      Pupils: Pupils are equal, round, and reactive to light.   Neck:      Comments: JVP 14cm  Cardiovascular:      Rate and Rhythm: Normal rate and regular rhythm.      Comments: Smooth VAD hum  Pulmonary:      Effort: Pulmonary effort is normal.      Breath sounds: Normal breath sounds.   Abdominal:      General: Abdomen is flat. There is no distension.      Palpations: Abdomen is soft.   Musculoskeletal:         General: Normal range of  motion.   Skin:     General: Skin is warm and dry.      Capillary Refill: Capillary refill takes 2 to 3 seconds.   Neurological:      General: No focal deficit present.      Mental Status: He is alert and oriented to person, place, and time.   Psychiatric:         Mood and Affect: Mood normal.         Behavior: Behavior normal.            Significant Labs:  CBC:  Recent Labs   Lab 04/21/24  0519 04/21/24  1511 04/22/24  0516   WBC 7.34 6.67 5.87   RBC 4.87 5.34 4.67   HGB 8.1* 8.8* 7.8*   HCT 29.8* 32.3* 28.4*    295 269   MCV 61* 61* 61*   MCH 16.6* 16.5* 16.7*   MCHC 27.2* 27.2* 27.5*     BNP:  Recent Labs   Lab 04/22/24  0516   *     CMP:  Recent Labs   Lab 04/21/24  0812 04/21/24  1700 04/22/24  0516 04/22/24  0749   GLU 81 205*  --  241*   CALCIUM 8.7 8.8  --  8.7   ALBUMIN 2.7* 2.6* 2.5*  --    PROT 7.2 7.3 6.8  --    * 135*  --  133*   K 2.8* 3.6  --  4.1   CO2 26 26  --  25   CL 98 99  --  102   BUN 8 8  --  10   CREATININE 0.9 1.0  --  1.0   ALKPHOS 167* 167* 170*  --    ALT 13 15 17  --    AST 39 44* 43*  --    BILITOT 2.4* 2.5* 2.0*  --       Coagulation:   Recent Labs   Lab 04/20/24  0209 04/21/24  0519 04/22/24  0516   INR 1.9* 2.2* 2.7*   APTT 32.5* 32.5* 34.3*     LDH:  Recent Labs   Lab 04/20/24  0209 04/21/24  0519 04/22/24  0516   * 294* 263*     Microbiology:  Microbiology Results (last 7 days)       Procedure Component Value Units Date/Time    Blood culture x two cultures. Draw prior to antibiotics. [[id number]] Collected: 04/20/24 0210    Order Status: Completed Specimen: Blood from Peripheral, Hand, Left Updated: 04/22/24 0613     Blood Culture, Routine No Growth to date      No Growth to date      No Growth to date    Narrative:      Aerobic and anaerobic    Blood culture x two cultures. Draw prior to antibiotics. [[id number]] Collected: 04/20/24 0209    Order Status: Completed Specimen: Blood from Peripheral, Antecubital, Left Updated: 04/22/24 0612     Blood  Culture, Routine No Growth to date      No Growth to date      No Growth to date    Narrative:      Aerobic and anaerobic            I have reviewed all pertinent labs within the past 24 hours.    Estimated Creatinine Clearance: 113.8 mL/min (based on SCr of 1 mg/dL).    Diagnostic Results:  I have reviewed all pertinent imaging results/findings within the past 24 hours.  Assessment and Plan:     Patient is a 39 year old male with stage D 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 He underwent RVAD removal and chest closure 6/30/2022.  He also completed a course of IV Abx for staph epi. He was weaned off  but he had to restarted due to RVF. He was eventually transitioned to milrinone (secondary to  shortage) now on 0.25 mcg/kg/min. He has a history of unclear syncope vs seizures and is followed by neuro for this and is on Keppra.       On 11/15/23 underwent removal of eroded [employer] scICD--no Lifevest (due to LVAD) and no plan to replace ICD as not requiring therapy post LVAD with concern for reinfection.  He has not had neurology f/u with seizure hx on keppra so coordinator to assist him with obtaining appt.  Patient presents for concerns for fever, vomiting and pain and concerns of driveline infection, low INR and hyperglycemia. Beta hydroxybutyrate negative. Lactic acid negative. Normal WBC count and CT Abd/Pelv with contrast without signs of driveline infection. Had elevated blood glucose 03/19/2024 and was advised to go to ED. Also advised to go to ED in the first week of April for fever of 102*F (reported) but did not go at that time. Patient reports that he has been taking lower doses of Lasix at home as compared to what has been prescribed. Exam consistent with  hypervolemia. Bedside TTE with IVC 2.5 cm and non collapsible IVC. No inflow outflow cannula issues visualized.     * Acute on chronic combined systolic and diastolic heart failure  -NICM s/p HM 3 on home Milrinone   -Last 2D Echo  9/5/23 : LVEF 10-15%, LVEDD  7.11 cm with speed at 5100  -Last RHC: 10/31/22 with speed at 5100 on Milrinone 0.125 RA: 18, RV: 35/7 (22), PAP: 35/19 (24), PWP: 21, CO: 3.55, CI: 1.6  -Hypervolemic on examination today  -Give Lasix 80mg IVP today.   -GDMT with N/A  -2g Na dietary restriction, 1500 mL fluid restriction, strict I/Os      Type 2 diabetes mellitus with hyperosmolar hyperglycemic state (HHS)  -Blood glucose 592 mg/dL on ISTAT, anion gap 13, with blood glucose on serum 602 mg/dL  -Endocrinology consult and patient on insulin drip  -management per endocrine      Seizure-like activity  -restarted home Keppra dose 1500 BID  -no seizure activity this hospital stay    LVAD (left ventricular assist device) present  -HeartMate 3 Implanted on 6/29/2022 as DT with RV failure on milrinone at 0.25 mcg/kg/min.  -Continue Coumadin; INR goal 2.0-3.0,  INR is therapeutic today   -Antiplatelets: ASA 81  -LDH is stable.  Will continue to monitor daily  -Speed set at 5100, LSL 4700 rpm  -Echo: 9/5/23 EF: 10-15%, LVEDD: 7.11cm.        Procedure: Device Interrogation Including analysis of device parameters  Current Settings: Ventricular Assist Device  Review of device function is stable        4/22/2024     8:32 AM 4/22/2024     5:00 AM 4/21/2024    11:27 PM 4/21/2024     7:05 PM 4/21/2024     5:13 PM 4/21/2024    12:02 PM 4/21/2024     7:50 AM   TXP LVAD INTERROGATIONS   Type HeartMate3 HeartMate3 HeartMate3 HeartMate3 HeartMate3 HeartMate3 HeartMate3   Flow 3.8 3.9 4.5 3.9 4.4 4.1 4.1   Speed 5100 5100 5100 5100 5100 5100 5100   PI 5.7 5.9 3.9 5.5 4.4 5.6 4.7   Power (Serna) 3.6 3.5 3.6 3.5 3.6 3.6 3.7   LSL 4700    4700 4700 4700   Pulsatility    Intermittent pulse Intermittent pulse  Intermittent pulse Intermittent pulse         HTN (hypertension)  -Goal MAP 60-90  -Was consistently above 90 yesterday and Norvasc started     Chronic anticoagulation  -INR 1.9 on admit  -Continue warfarin    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    CHF (NYHA class IV, ACC/AHA stage D)  -NICM  -see above     Anemia of chronic disease  -H/H stable        Chantal Herndon MD  Heart Transplant  [facility] - [facility]

## 2024-04-22 NOTE — ASSESSMENT & PLAN NOTE
Endocrinology consulted for BG management.   BG goal 140-180     - Transition drip at 1.7 units/hr with step-down parameters. (30% increase due to fasting blood glucose above goal)   - Novolog (aspart) insulin 14 Units SQ TIDWM and prn for BG excursions [facility] SSI (150/25).  - BG checks /HS/0200  - Hypoglycemia protocol in place    ** Please notify Endocrine for any change and/or advance in diet**  ** Please call Endocrine for any BG related issues **    Discharge Planning:   TBD. Please notify endocrinology prior to discharge.

## 2024-04-22 NOTE — PROGRESS NOTES
04/22/2024  John Alaniz    Current provider:  Natalya Villatoro MD    Device interrogation:      4/22/2024     5:00 AM 4/21/2024    11:27 PM 4/21/2024     7:05 PM 4/21/2024     5:13 PM 4/21/2024    12:02 PM 4/21/2024     7:50 AM 4/21/2024     5:08 AM   TXP LVAD INTERROGATIONS   Type HeartMate3 HeartMate3 HeartMate3 HeartMate3 HeartMate3 HeartMate3 HeartMate3   Flow 3.9 4.5 3.9 4.4 4.1 4.1 3.9   Speed 5100 5100 5100 5100 5100 5100 5100   PI 5.9 3.9 5.5 4.4 5.6 4.7 6   Power (Serna) 3.5 3.6 3.5 3.6 3.6 3.7 3.7   LSL    4700 4700 4700    Pulsatility   Intermittent pulse Intermittent pulse Intermittent pulse Intermittent pulse           Rounded on Kevan Queen to ensure all mechanical assist device settings (IABP or VAD) were appropriate and all parameters were within limits.  I was able to ensure all back up equipment was present, the staff had no issues, and the Perfusion Department daily rounding was complete.      For implantable VADs: Interrogation of Ventricular assist device was performed with analysis of device parameters and review of device function. I have personally reviewed the interrogation findings and agree with findings as stated.     In emergency, the nursing units have been notified to contact the perfusion department either by:  Calling [phone] from 630am to 4pm Mon thru Fri, utilizing the On-Call Finder functionality of Epic and searching for Perfusion, or by contacting the hospital  from 4pm to 630am and on weekends and asking to speak with the perfusionist on call.    7:38 AM

## 2024-04-22 NOTE — NURSING
Patient ambulating independently, fall precautions in place. LVAD DP and numbers WNL, smooth LVAD hum. Discussed medications and care. Patient has no questions at this time. Patient is on milrinone drip at 7.1 ml/hr. Patient is currently on insulin drip and the insulin drip will be d/cd tonight per MD order. Bed is in the lowest position, wheels locked, call light within reach.

## 2024-04-22 NOTE — NURSING
LVAD dressing change completed using sterile technique with chlorhexidine daily kit. DLES is a 2 with minimal drainage noted on the drain sponge. Tolerated without any complication. No redness, or tenderness noted. Next dressing change due 4/23/2024.

## 2024-04-22 NOTE — ASSESSMENT & PLAN NOTE
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  -Continue Coumadin; INR goal 2.0-3.0,  INR is therapeutic today   -Antiplatelets: ASA 81  -LDH is stable.  Will continue to monitor daily  -Speed set at 5100, LSL 4700 rpm  -Echo: 9/5/23 EF: 10-15%, LVEDD: 7.11cm.        Procedure: Device Interrogation Including analysis of device parameters  Current Settings: Ventricular Assist Device  Review of device function is stable        4/22/2024     8:32 AM 4/22/2024     5:00 AM 4/21/2024    11:27 PM 4/21/2024     7:05 PM 4/21/2024     5:13 PM 4/21/2024    12:02 PM 4/21/2024     7:50 AM   TXP LVAD INTERROGATIONS   Type HeartMate3 HeartMate3 HeartMate3 HeartMate3 HeartMate3 HeartMate3 HeartMate3   Flow 3.8 3.9 4.5 3.9 4.4 4.1 4.1   Speed 5100 5100 5100 5100 5100 5100 5100   PI 5.7 5.9 3.9 5.5 4.4 5.6 4.7   Power (Serna) 3.6 3.5 3.6 3.5 3.6 3.6 3.7   LSL 4700    4700 4700 4700   Pulsatility    Intermittent pulse Intermittent pulse Intermittent pulse Intermittent pulse

## 2024-04-22 NOTE — SUBJECTIVE & OBJECTIVE
"Interval HPI:   Overnight events: No acute events overnight. Patient in room 315/315 A. Blood glucose improving. BG at and below goal on current insulin regimen (Transition Insulin Drip). Steroid use- None.    Renal function- Normal   Vasopressors-  None       Endocrine will continue to follow and manage insulin orders inpatient.         Diet diabetic  Calorie '  Eatin%  Nausea: No  Hypoglycemia and intervention: No  Fever: No  TPN and/or TF: No      BP (!) 88/0 (BP Location: Right arm, Patient Position: Lying)   Pulse 109   Temp 97.8 °F (36.6 °C) (Oral)   Resp 18   Ht 6' 3" (1.905 m)   Wt 81.1 kg (178 lb 13.4 oz)   SpO2 98%   BMI 22.35 kg/m²     Labs Reviewed and Include    Recent Labs   Lab 24  0516 24  0749   GLU  --  241*   CALCIUM  --  8.7   ALBUMIN 2.5*  --    PROT 6.8  --    NA  --  133*   K  --  4.1   CO2  --  25   CL  --  102   BUN  --  10   CREATININE  --  1.0   ALKPHOS 170*  --    ALT 17  --    AST 43*  --    BILITOT 2.0*  --      Lab Results   Component Value Date    WBC 5.87 2024    HGB 7.8 (L) 2024    HCT 28.4 (L) 2024    MCV 61 (L) 2024     2024     No results for input(s): "TSH", "FREET4" in the last 168 hours.  Lab Results   Component Value Date    HGBA1C >14.0 (H) 2024       Nutritional status:   Body mass index is 22.35 kg/m².  Lab Results   Component Value Date    ALBUMIN 2.5 (L) 2024    ALBUMIN 2.6 (L) 2024    ALBUMIN 2.7 (L) 2024     Lab Results   Component Value Date    PREALBUMIN 9 (L) 2024    PREALBUMIN 14 (L) 2024    PREALBUMIN 21 11/15/2023       Estimated Creatinine Clearance: 113.8 mL/min (based on SCr of 1 mg/dL).    Accu-Checks  Recent Labs     24  2341 24  0515 24  0751 24  0824 24  1151 24  1213 24  1640 24  2136 24  0633 24  0831   POCTGLUCOSE 198* 102 94 110 157* 154* 214* 197* 288* 196*       Current Medications and/or " Treatments Impacting Glycemic Control  Immunotherapy:    Immunosuppressants       None          Steroids:   Hormones (From admission, onward)      None          Pressors:    Autonomic Drugs (From admission, onward)      None          Hyperglycemia/Diabetes Medications:   Antihyperglycemics (From admission, onward)      Start     Stop Route Frequency Ordered    04/22/24 1130  insulin aspart U-100 pen 14 Units         -- SubQ 3 times daily with meals 04/22/24 0857    04/20/24 2100  insulin regular in 0.9 % NaCl 100 unit/100 mL (1 unit/mL) infusion        Question:  Enter initial dose (Units/hr):  Answer:  1.7    -- IV Continuous 04/20/24 1947 04/20/24 2046  insulin aspart U-100 pen 0-10 Units         -- SubQ Before meals, nightly and at 0200 PRN 04/20/24 1947

## 2024-04-23 LAB
ALBUMIN SERPL BCP-MCNC: 2.6 G/DL (ref 3.5–5.2)
ALP SERPL-CCNC: 180 U/L (ref 55–135)
ALT SERPL W/O P-5'-P-CCNC: 17 U/L (ref 10–44)
ANION GAP SERPL CALC-SCNC: 7 MMOL/L (ref 8–16)
ANION GAP SERPL CALC-SCNC: 8 MMOL/L (ref 8–16)
APTT PPP: 34.3 SEC (ref 21–32)
AST SERPL-CCNC: 43 U/L (ref 10–40)
BASOPHILS # BLD AUTO: 0.06 K/UL (ref 0–0.2)
BASOPHILS NFR BLD: 0.9 % (ref 0–1.9)
BILIRUB SERPL-MCNC: 1.6 MG/DL (ref 0.1–1)
BUN SERPL-MCNC: 13 MG/DL (ref 6–20)
BUN SERPL-MCNC: 15 MG/DL (ref 6–20)
CALCIUM SERPL-MCNC: 8.9 MG/DL (ref 8.7–10.5)
CALCIUM SERPL-MCNC: 9.5 MG/DL (ref 8.7–10.5)
CHLORIDE SERPL-SCNC: 102 MMOL/L (ref 95–110)
CHLORIDE SERPL-SCNC: 97 MMOL/L (ref 95–110)
CO2 SERPL-SCNC: 26 MMOL/L (ref 23–29)
CO2 SERPL-SCNC: 27 MMOL/L (ref 23–29)
CREAT SERPL-MCNC: 1.2 MG/DL (ref 0.5–1.4)
CREAT SERPL-MCNC: 1.3 MG/DL (ref 0.5–1.4)
DIFFERENTIAL METHOD BLD: ABNORMAL
EOSINOPHIL # BLD AUTO: 0.1 K/UL (ref 0–0.5)
EOSINOPHIL NFR BLD: 0.9 % (ref 0–8)
ERYTHROCYTE [DISTWIDTH] IN BLOOD BY AUTOMATED COUNT: 24.7 % (ref 11.5–14.5)
EST. GFR  (NO RACE VARIABLE): >60 ML/MIN/1.73 M^2
EST. GFR  (NO RACE VARIABLE): >60 ML/MIN/1.73 M^2
GLUCOSE SERPL-MCNC: 173 MG/DL (ref 70–110)
GLUCOSE SERPL-MCNC: 211 MG/DL (ref 70–110)
HCT VFR BLD AUTO: 28.5 % (ref 40–54)
HGB BLD-MCNC: 7.6 G/DL (ref 14–18)
IMM GRANULOCYTES # BLD AUTO: 0.02 K/UL (ref 0–0.04)
IMM GRANULOCYTES NFR BLD AUTO: 0.3 % (ref 0–0.5)
INR PPP: 2.5 (ref 0.8–1.2)
LDH SERPL L TO P-CCNC: 279 U/L (ref 110–260)
LYMPHOCYTES # BLD AUTO: 1.6 K/UL (ref 1–4.8)
LYMPHOCYTES NFR BLD: 25.7 % (ref 18–48)
MAGNESIUM SERPL-MCNC: 1.9 MG/DL (ref 1.6–2.6)
MAGNESIUM SERPL-MCNC: 2 MG/DL (ref 1.6–2.6)
MAGNESIUM SERPL-MCNC: 2 MG/DL (ref 1.6–2.6)
MCH RBC QN AUTO: 16.6 PG (ref 27–31)
MCHC RBC AUTO-ENTMCNC: 26.7 G/DL (ref 32–36)
MCV RBC AUTO: 62 FL (ref 82–98)
MONOCYTES # BLD AUTO: 0.8 K/UL (ref 0.3–1)
MONOCYTES NFR BLD: 12.1 % (ref 4–15)
NEUTROPHILS # BLD AUTO: 3.8 K/UL (ref 1.8–7.7)
NEUTROPHILS NFR BLD: 60.1 % (ref 38–73)
NRBC BLD-RTO: 0 /100 WBC
PHOSPHATE SERPL-MCNC: 2.7 MG/DL (ref 2.7–4.5)
PLATELET # BLD AUTO: 250 K/UL (ref 150–450)
PMV BLD AUTO: ABNORMAL FL (ref 9.2–12.9)
POCT GLUCOSE: 167 MG/DL (ref 70–110)
POCT GLUCOSE: 227 MG/DL (ref 70–110)
POCT GLUCOSE: 231 MG/DL (ref 70–110)
POCT GLUCOSE: 291 MG/DL (ref 70–110)
POCT GLUCOSE: 315 MG/DL (ref 70–110)
POCT GLUCOSE: 419 MG/DL (ref 70–110)
POCT GLUCOSE: 422 MG/DL (ref 70–110)
POCT GLUCOSE: 443 MG/DL (ref 70–110)
POCT GLUCOSE: 447 MG/DL (ref 70–110)
POCT GLUCOSE: 464 MG/DL (ref 70–110)
POTASSIUM SERPL-SCNC: 3.9 MMOL/L (ref 3.5–5.1)
POTASSIUM SERPL-SCNC: 4 MMOL/L (ref 3.5–5.1)
POTASSIUM SERPL-SCNC: 4.3 MMOL/L (ref 3.5–5.1)
PROT SERPL-MCNC: 6.9 G/DL (ref 6–8.4)
PROTHROMBIN TIME: 26.2 SEC (ref 9–12.5)
RBC # BLD AUTO: 4.59 M/UL (ref 4.6–6.2)
SODIUM SERPL-SCNC: 132 MMOL/L (ref 136–145)
SODIUM SERPL-SCNC: 135 MMOL/L (ref 136–145)
WBC # BLD AUTO: 6.35 K/UL (ref 3.9–12.7)

## 2024-04-23 PROCEDURE — 25000003 PHARM REV CODE 250: Performed by: STUDENT IN AN ORGANIZED HEALTH CARE EDUCATION/TRAINING PROGRAM

## 2024-04-23 PROCEDURE — 20600001 HC STEP DOWN PRIVATE ROOM

## 2024-04-23 PROCEDURE — 85025 COMPLETE CBC W/AUTO DIFF WBC: CPT | Performed by: INTERNAL MEDICINE

## 2024-04-23 PROCEDURE — 80053 COMPREHEN METABOLIC PANEL: CPT | Performed by: STUDENT IN AN ORGANIZED HEALTH CARE EDUCATION/TRAINING PROGRAM

## 2024-04-23 PROCEDURE — 84132 ASSAY OF SERUM POTASSIUM: CPT | Performed by: STUDENT IN AN ORGANIZED HEALTH CARE EDUCATION/TRAINING PROGRAM

## 2024-04-23 PROCEDURE — 63600175 PHARM REV CODE 636 W HCPCS

## 2024-04-23 PROCEDURE — 83615 LACTATE (LD) (LDH) ENZYME: CPT | Performed by: INTERNAL MEDICINE

## 2024-04-23 PROCEDURE — 99232 SBSQ HOSP IP/OBS MODERATE 35: CPT | Mod: ,,,

## 2024-04-23 PROCEDURE — 83735 ASSAY OF MAGNESIUM: CPT | Mod: 91 | Performed by: INTERNAL MEDICINE

## 2024-04-23 PROCEDURE — 84100 ASSAY OF PHOSPHORUS: CPT | Performed by: INTERNAL MEDICINE

## 2024-04-23 PROCEDURE — 27000248 HC VAD-ADDITIONAL DAY

## 2024-04-23 PROCEDURE — A4216 STERILE WATER/SALINE, 10 ML: HCPCS | Performed by: INTERNAL MEDICINE

## 2024-04-23 PROCEDURE — 25000003 PHARM REV CODE 250: Performed by: INTERNAL MEDICINE

## 2024-04-23 PROCEDURE — 63600175 PHARM REV CODE 636 W HCPCS: Performed by: INTERNAL MEDICINE

## 2024-04-23 PROCEDURE — 83735 ASSAY OF MAGNESIUM: CPT | Performed by: INTERNAL MEDICINE

## 2024-04-23 PROCEDURE — 25000003 PHARM REV CODE 250: Performed by: PHYSICIAN ASSISTANT

## 2024-04-23 PROCEDURE — 85730 THROMBOPLASTIN TIME PARTIAL: CPT | Performed by: INTERNAL MEDICINE

## 2024-04-23 PROCEDURE — 25000003 PHARM REV CODE 250

## 2024-04-23 PROCEDURE — 63600175 PHARM REV CODE 636 W HCPCS: Performed by: STUDENT IN AN ORGANIZED HEALTH CARE EDUCATION/TRAINING PROGRAM

## 2024-04-23 PROCEDURE — 83735 ASSAY OF MAGNESIUM: CPT | Mod: 91 | Performed by: STUDENT IN AN ORGANIZED HEALTH CARE EDUCATION/TRAINING PROGRAM

## 2024-04-23 PROCEDURE — 85610 PROTHROMBIN TIME: CPT | Performed by: INTERNAL MEDICINE

## 2024-04-23 PROCEDURE — 80048 BASIC METABOLIC PNL TOTAL CA: CPT | Mod: XB | Performed by: INTERNAL MEDICINE

## 2024-04-23 RX ORDER — FUROSEMIDE 10 MG/ML
80 INJECTION INTRAMUSCULAR; INTRAVENOUS EVERY 12 HOURS
Status: DISCONTINUED | OUTPATIENT
Start: 2024-04-23 | End: 2024-04-24

## 2024-04-23 RX ORDER — IBUPROFEN 200 MG
16 TABLET ORAL
Status: DISCONTINUED | OUTPATIENT
Start: 2024-04-23 | End: 2024-04-25

## 2024-04-23 RX ORDER — INSULIN ASPART 100 [IU]/ML
18 INJECTION, SOLUTION INTRAVENOUS; SUBCUTANEOUS
Status: DISCONTINUED | OUTPATIENT
Start: 2024-04-23 | End: 2024-04-24

## 2024-04-23 RX ORDER — IBUPROFEN 200 MG
24 TABLET ORAL
Status: DISCONTINUED | OUTPATIENT
Start: 2024-04-23 | End: 2024-04-25

## 2024-04-23 RX ORDER — TRAMADOL HYDROCHLORIDE 50 MG/1
50 TABLET ORAL EVERY 6 HOURS PRN
Status: DISCONTINUED | OUTPATIENT
Start: 2024-04-23 | End: 2024-04-23

## 2024-04-23 RX ORDER — BACLOFEN 5 MG/1
5 TABLET ORAL 3 TIMES DAILY
Status: DISCONTINUED | OUTPATIENT
Start: 2024-04-23 | End: 2024-04-24

## 2024-04-23 RX ORDER — MAGNESIUM SULFATE HEPTAHYDRATE 40 MG/ML
2 INJECTION, SOLUTION INTRAVENOUS ONCE
Status: COMPLETED | OUTPATIENT
Start: 2024-04-23 | End: 2024-04-23

## 2024-04-23 RX ORDER — GLUCAGON 1 MG
1 KIT INJECTION
Status: DISCONTINUED | OUTPATIENT
Start: 2024-04-23 | End: 2024-04-25

## 2024-04-23 RX ORDER — INSULIN ASPART 100 [IU]/ML
0-10 INJECTION, SOLUTION INTRAVENOUS; SUBCUTANEOUS
Status: DISCONTINUED | OUTPATIENT
Start: 2024-04-23 | End: 2024-04-25

## 2024-04-23 RX ORDER — WARFARIN 3 MG/1
3 TABLET ORAL
Status: DISCONTINUED | OUTPATIENT
Start: 2024-04-26 | End: 2024-04-27 | Stop reason: HOSPADM

## 2024-04-23 RX ORDER — POTASSIUM CHLORIDE 20 MEQ/1
40 TABLET, EXTENDED RELEASE ORAL ONCE
Status: COMPLETED | OUTPATIENT
Start: 2024-04-23 | End: 2024-04-23

## 2024-04-23 RX ADMIN — Medication 10 ML: at 01:04

## 2024-04-23 RX ADMIN — LEVETIRACETAM 1500 MG: 500 TABLET, FILM COATED ORAL at 08:04

## 2024-04-23 RX ADMIN — INSULIN ASPART 10 UNITS: 100 INJECTION, SOLUTION INTRAVENOUS; SUBCUTANEOUS at 02:04

## 2024-04-23 RX ADMIN — WARFARIN SODIUM 4.5 MG: 2.5 TABLET ORAL at 04:04

## 2024-04-23 RX ADMIN — MIRTAZAPINE 15 MG: 15 TABLET, FILM COATED ORAL at 10:04

## 2024-04-23 RX ADMIN — INSULIN DETEMIR 40 UNITS: 100 INJECTION, SOLUTION SUBCUTANEOUS at 10:04

## 2024-04-23 RX ADMIN — Medication 10 ML: at 06:04

## 2024-04-23 RX ADMIN — MILRINONE LACTATE IN DEXTROSE 0.25 MCG/KG/MIN: 200 INJECTION, SOLUTION INTRAVENOUS at 10:04

## 2024-04-23 RX ADMIN — FUROSEMIDE 80 MG: 10 INJECTION, SOLUTION INTRAVENOUS at 08:04

## 2024-04-23 RX ADMIN — POTASSIUM CHLORIDE 40 MEQ: 1500 TABLET, EXTENDED RELEASE ORAL at 04:04

## 2024-04-23 RX ADMIN — ASPIRIN 81 MG: 81 TABLET, COATED ORAL at 08:04

## 2024-04-23 RX ADMIN — MUPIROCIN: 20 OINTMENT TOPICAL at 10:04

## 2024-04-23 RX ADMIN — TRAMADOL HYDROCHLORIDE 50 MG: 50 TABLET, COATED ORAL at 12:04

## 2024-04-23 RX ADMIN — BACLOFEN 5 MG: 5 TABLET ORAL at 10:04

## 2024-04-23 RX ADMIN — MAGNESIUM SULFATE HEPTAHYDRATE 2 G: 40 INJECTION, SOLUTION INTRAVENOUS at 08:04

## 2024-04-23 RX ADMIN — BACLOFEN 5 MG: 5 TABLET ORAL at 02:04

## 2024-04-23 RX ADMIN — GABAPENTIN 300 MG: 300 CAPSULE ORAL at 08:04

## 2024-04-23 RX ADMIN — INSULIN ASPART 4 UNITS: 100 INJECTION, SOLUTION INTRAVENOUS; SUBCUTANEOUS at 09:04

## 2024-04-23 RX ADMIN — INSULIN ASPART 10 UNITS: 100 INJECTION, SOLUTION INTRAVENOUS; SUBCUTANEOUS at 12:04

## 2024-04-23 RX ADMIN — INSULIN ASPART 4 UNITS: 100 INJECTION, SOLUTION INTRAVENOUS; SUBCUTANEOUS at 10:04

## 2024-04-23 RX ADMIN — INSULIN ASPART 18 UNITS: 100 INJECTION, SOLUTION INTRAVENOUS; SUBCUTANEOUS at 06:04

## 2024-04-23 RX ADMIN — SPIRONOLACTONE 25 MG: 25 TABLET ORAL at 08:04

## 2024-04-23 RX ADMIN — LEVETIRACETAM 1500 MG: 500 TABLET, FILM COATED ORAL at 10:04

## 2024-04-23 RX ADMIN — INSULIN ASPART 14 UNITS: 100 INJECTION, SOLUTION INTRAVENOUS; SUBCUTANEOUS at 09:04

## 2024-04-23 RX ADMIN — Medication 400 MG: at 08:04

## 2024-04-23 RX ADMIN — INSULIN ASPART 2 UNITS: 100 INJECTION, SOLUTION INTRAVENOUS; SUBCUTANEOUS at 06:04

## 2024-04-23 RX ADMIN — AMLODIPINE BESYLATE 5 MG: 5 TABLET ORAL at 08:04

## 2024-04-23 RX ADMIN — BACLOFEN 5 MG: 5 TABLET ORAL at 08:04

## 2024-04-23 RX ADMIN — PANTOPRAZOLE SODIUM 40 MG: 40 TABLET, DELAYED RELEASE ORAL at 08:04

## 2024-04-23 RX ADMIN — ACETAMINOPHEN 650 MG: 325 TABLET ORAL at 04:04

## 2024-04-23 RX ADMIN — MILRINONE LACTATE IN DEXTROSE 0.25 MCG/KG/MIN: 200 INJECTION, SOLUTION INTRAVENOUS at 06:04

## 2024-04-23 RX ADMIN — ATORVASTATIN CALCIUM 40 MG: 20 TABLET, FILM COATED ORAL at 08:04

## 2024-04-23 RX ADMIN — INSULIN ASPART 18 UNITS: 100 INJECTION, SOLUTION INTRAVENOUS; SUBCUTANEOUS at 01:04

## 2024-04-23 NOTE — PROGRESS NOTES
[facility]  Endocrinology  Progress Note    Admit Date: 4/20/2024     Reason for Consult: Management of T2DM, Hyperglycemia      Surgical Procedure and Date: LVAD 06/29/2022     Diabetes diagnosis year: 2022     Home Diabetes Medications:   -Levemir 30 units QD   -Novolog 20 units TIDWM in addition to the following correction scale:    150 - 200 + 2 unit    201 - 250 + 4 units    251 - 300 + 6 units    301 - 350 + 8 units       > 350   + 10 units     How often checking glucose at home?  > 4 times per day  BG readings on regimen: 180's-200's  Hypoglycemia on the regimen?  No  Missed doses on regimen?  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 He underwent RVAD removal and chest closure 6/30/2022.  He also completed a course of IV Abx for staph epi. He was weaned off  but he had to restarted due to RVF. He was eventually transitioned to milrinone (secondary to  shortage) now on 0.25 mcg/kg/min. He has a history of unclear syncope vs seizures and is followed by neuro for this and is on Keppra.   On 11/15/23 underwent removal of eroded [employer] scICD--no Lifevest (due to LVAD) and no plan to replace ICD as not requiring therapy post LVAD with concern for reinfection.  He has not had neurology f/u with seizure hx on keppra so coordinator to assist him with obtaining appt.  Patient presents for concerns for fever, vomiting and pain and concerns of driveline infection, low INR and hyperglycemia. Beta hydroxybutyrate  "negative. Lactic acid negative. Normal WBC count and CT Abd/Pelv with contrast without signs of driveline infection. Had elevated blood glucose 2024 and was advised to go to ED. Also advised to go to ED in the first week of April for fever of 102*F (reported) but did not go at that time. Patient reports that he has been taking lower doses of Lasix at home as compared to what has been prescribed. Exam consistent with hypervolemia. Bedside TTE with IVC 2.5 cm and non collapsible IVC. No inflow outflow cannula issues visualized. Endocrine consulted to manage hyperglycemia and type 2 diabetes.     Lab Results   Component Value Date    HGBA1C >14.0 (H) 2024         Interval HPI:   Overnight events:No acute events overnight. Patient in room 315/315 A. Blood glucose variable. BG above goal on current insulin regimen (SSI, prandial, and basal insulin ). Steroid use- None.      Renal function- Normal   Vasopressors-  None     Diet diabetic  Calorie     Eatin%  Nausea: No  Hypoglycemia and intervention: No  Fever: No  TPN and/or TF: No      BP (!) 80/0   Pulse 100   Temp 97.7 °F (36.5 °C) (Oral)   Resp 18   Ht 6' 3" (1.905 m)   Wt 76.1 kg (167 lb 12.3 oz)   SpO2 98%   BMI 20.97 kg/m²     Labs Reviewed and Include    Recent Labs   Lab 24  0538   *   CALCIUM 8.9   ALBUMIN 2.6*   PROT 6.9   *   K 4.3   CO2 26      BUN 13   CREATININE 1.2   ALKPHOS 180*   ALT 17   AST 43*   BILITOT 1.6*     Lab Results   Component Value Date    WBC 6.35 2024    HGB 7.6 (L) 2024    HCT 28.5 (L) 2024    MCV 62 (L) 2024     2024     No results for input(s): "TSH", "FREET4" in the last 168 hours.  Lab Results   Component Value Date    HGBA1C >14.0 (H) 2024       Nutritional status:   Body mass index is 20.97 kg/m².  Lab Results   Component Value Date    ALBUMIN 2.6 (L) 2024    ALBUMIN 2.5 (L) 2024    ALBUMIN 2.6 (L) 2024     Lab " Results   Component Value Date    PREALBUMIN 9 (L) 04/22/2024    PREALBUMIN 14 (L) 01/03/2024    PREALBUMIN 21 11/15/2023       Estimated Creatinine Clearance: 89 mL/min (based on SCr of 1.2 mg/dL).    Accu-Checks  Recent Labs     04/22/24  0633 04/22/24  0831 04/22/24  1146 04/22/24  1659 04/22/24  2102 04/23/24  0023 04/23/24  0024 04/23/24  0034 04/23/24  0534 04/23/24  0830   POCTGLUCOSE 288* 196* 148* 163* 156* 443* 422* 464* 231* 227*       Current Medications and/or Treatments Impacting Glycemic Control  Immunotherapy:    Immunosuppressants       None          Steroids:   Hormones (From admission, onward)      None          Pressors:    Autonomic Drugs (From admission, onward)      None          Hyperglycemia/Diabetes Medications:   Antihyperglycemics (From admission, onward)      Start     Stop Route Frequency Ordered    04/23/24 2100  insulin detemir U-100 (Levemir) pen 40 Units         -- SubQ Nightly 04/23/24 0836    04/22/24 1130  insulin aspart U-100 pen 14 Units         -- SubQ 3 times daily with meals 04/22/24 0857    04/20/24 2100  insulin regular in 0.9 % NaCl 100 unit/100 mL (1 unit/mL) infusion        Question:  Enter initial dose (Units/hr):  Answer:  1.7    04/22/24 2100 IV Continuous 04/20/24 1947 04/20/24 2046  insulin aspart U-100 pen 0-10 Units         -- SubQ Before meals, nightly and at 0200 PRN 04/20/24 1947            ASSESSMENT and PLAN    Cardiac/Vascular  * Acute on chronic combined systolic and diastolic heart failure  Managed per primary team  Avoid hypoglycemia        LVAD (left ventricular assist device) present  Managed per primary team  Avoid hypoglycemia        Endocrine  Type 2 diabetes mellitus with hyperglycemia, with long-term current use of insulin  BG goal 140-180     - Levemir 40 units nightly (20% increase due to fasting blood glucose above goal)  - Novolog (aspart) insulin 18 Units SQ TIDWM (30% increase given post-prandial BG excursions) and prn for BG excursions  [facility] SSI (150/25).  - BG checks /HS/0200  - Hypoglycemia protocol in place    ** Please notify Endocrine for any change and/or advance in diet**  ** Please call Endocrine for any BG related issues **    Discharge Planning:   TBD. Please notify endocrinology prior to discharge.              Susanna Doty PA-C  Endocrinology  [facility]

## 2024-04-23 NOTE — PROGRESS NOTES
[facility] - Cardiology Stepdown  Heart Transplant  Progress Note    Patient Name: Kevan Queen  MRN: [id number]  Admission Date: 4/20/2024  Hospital Length of Stay: 3 days  Attending Physician: Natalya Villatoro MD  Primary Care Provider: Rosio Armendariz FNP  Principal Problem:Acute on chronic combined systolic and diastolic heart failure    Subjective:   Interval History: NAEON. No acute complaints. CVP 13.     Continuous Infusions:  Current Facility-Administered Medications   Medication Dose Route Frequency Last Rate Last Admin    dextrose 5 % and 0.45 % NaCl   Intravenous Continuous PRN        milrinone 20mg/100ml D5W (200mcg/ml)  0.25 mcg/kg/min Intravenous Continuous 7.1 mL/hr at 04/23/24 0649 0.25 mcg/kg/min at 04/23/24 0649     Scheduled Meds:  Current Facility-Administered Medications   Medication Dose Route Frequency    amLODIPine  5 mg Oral Daily    aspirin  81 mg Oral Daily    atorvastatin  40 mg Oral Daily    baclofen  5 mg Oral TID    furosemide (LASIX) injection  80 mg Intravenous Q12H    gabapentin  300 mg Oral Daily    insulin aspart U-100  14 Units Subcutaneous TIDWM    insulin detemir U-100  40 Units Subcutaneous QHS    levETIRAcetam  1,500 mg Oral BID    magnesium oxide  400 mg Oral Daily    magnesium sulfate IVPB  2 g Intravenous Once    mirtazapine  15 mg Oral Nightly    mupirocin   Nasal BID    pantoprazole  40 mg Oral Daily    sodium chloride 0.9%  10 mL Intravenous Q6H    spironolactone  25 mg Oral Daily    warfarin  2.5 mg Oral Daily     PRN Meds:  Current Facility-Administered Medications:     acetaminophen, 650 mg, Oral, Q6H PRN    cyclobenzaprine, 5 mg, Oral, TID PRN    dextrose 10%, 12.5 g, Intravenous, PRN    dextrose 10%, 25 g, Intravenous, PRN    dextrose 5 % and 0.45 % NaCl, , Intravenous, Continuous PRN    glucagon (human recombinant), 1 mg, Intramuscular, PRN    glucose, 16 g, Oral, PRN    glucose, 24 g, Oral, PRN    insulin aspart U-100, 0-10 Units, Subcutaneous, AC + HS +  0200 PRN    ondansetron, 4 mg, Oral, Q8H PRN    sodium chloride 0.9%, 10 mL, Intravenous, PRN    Flushing PICC/Midline Protocol, , , Until Discontinued **AND** sodium chloride 0.9%, 10 mL, Intravenous, Q6H **AND** sodium chloride 0.9%, 10 mL, Intravenous, PRN    traMADoL, 50 mg, Oral, Q6H PRN    Review of patient's allergies indicates:   Allergen Reactions    Bumex [bumetanide] Hives    Torsemide Hives     Objective:     Vital Signs (Most Recent):  Temp: 97.7 °F (36.5 °C) (04/23/24 0752)  Pulse: 100 (04/23/24 0753)  Resp: 18 (04/23/24 0752)  BP: (!) 80/0 (04/23/24 0800)  SpO2: 98 % (04/23/24 0752) Vital Signs (24h Range):  Temp:  [97.7 °F (36.5 °C)-98.3 °F (36.8 °C)] 97.7 °F (36.5 °C)  Pulse:  [] 100  Resp:  [18] 18  SpO2:  [98 %] 98 %  BP: ()/(0-73) 80/0     Patient Vitals for the past 72 hrs (Last 3 readings):   Weight   04/23/24 0429 76.1 kg (167 lb 12.3 oz)   04/21/24 0506 81.1 kg (178 lb 13.4 oz)     Body mass index is 20.97 kg/m².      Intake/Output Summary (Last 24 hours) at 4/23/2024 0951  Last data filed at 4/23/2024 0428  Gross per 24 hour   Intake 684 ml   Output 5100 ml   Net -4416 ml       Hemodynamic Parameters:       Telemetry: reviewed     Physical Exam  Vitals and nursing note reviewed.   Constitutional:       Appearance: Normal appearance.   HENT:      Head: Normocephalic and atraumatic.      Nose: Nose normal.   Eyes:      Pupils: Pupils are equal, round, and reactive to light.   Neck:      Vascular: JVD present.      Comments: JVP midneck at 45 degrees.   Cardiovascular:      Rate and Rhythm: Normal rate and regular rhythm.      Comments: Smooth VAD hum  Pulmonary:      Effort: Pulmonary effort is normal.      Breath sounds: Normal breath sounds.   Abdominal:      General: Abdomen is flat. There is no distension.      Palpations: Abdomen is soft.   Musculoskeletal:         General: Normal range of motion.   Skin:     General: Skin is warm and dry.      Capillary Refill: Capillary  refill takes 2 to 3 seconds.   Neurological:      General: No focal deficit present.      Mental Status: He is alert and oriented to person, place, and time.   Psychiatric:         Mood and Affect: Mood normal.         Behavior: Behavior normal.            Significant Labs:  CBC:  Recent Labs   Lab 04/21/24  1511 04/22/24  0516 04/23/24  0538   WBC 6.67 5.87 6.35   RBC 5.34 4.67 4.59*   HGB 8.8* 7.8* 7.6*   HCT 32.3* 28.4* 28.5*    269 250   MCV 61* 61* 62*   MCH 16.5* 16.7* 16.6*   MCHC 27.2* 27.5* 26.7*     BNP:  Recent Labs   Lab 04/22/24  0516   *     CMP:  Recent Labs   Lab 04/21/24  1700 04/22/24  0516 04/22/24  0749 04/22/24  1445 04/23/24  0538   *  --  241*  --  211*   CALCIUM 8.8  --  8.7  --  8.9   ALBUMIN 2.6* 2.5*  --   --  2.6*   PROT 7.3 6.8  --   --  6.9   *  --  133*  --  135*   K 3.6  --  4.1 3.8 4.3   CO2 26  --  25  --  26   CL 99  --  102  --  102   BUN 8  --  10  --  13   CREATININE 1.0  --  1.0  --  1.2   ALKPHOS 167* 170*  --   --  180*   ALT 15 17  --   --  17   AST 44* 43*  --   --  43*   BILITOT 2.5* 2.0*  --   --  1.6*      Coagulation:   Recent Labs   Lab 04/21/24  0519 04/22/24  0516 04/23/24  0538   INR 2.2* 2.7* 2.5*   APTT 32.5* 34.3* 34.3*     LDH:  Recent Labs   Lab 04/21/24  0519 04/22/24  0516 04/23/24  0538   * 263* 279*     Microbiology:  Microbiology Results (last 7 days)       Procedure Component Value Units Date/Time    Blood culture x two cultures. Draw prior to antibiotics. [[id number]] Collected: 04/20/24 0210    Order Status: Completed Specimen: Blood from Peripheral, Hand, Left Updated: 04/23/24 0612     Blood Culture, Routine No Growth to date      No Growth to date      No Growth to date      No Growth to date    Narrative:      Aerobic and anaerobic    Blood culture x two cultures. Draw prior to antibiotics. [[id number]] Collected: 04/20/24 0209    Order Status: Completed Specimen: Blood from Peripheral, Antecubital, Left Updated:  04/23/24 0612     Blood Culture, Routine No Growth to date      No Growth to date      No Growth to date      No Growth to date    Narrative:      Aerobic and anaerobic            I have reviewed all pertinent labs within the past 24 hours.    Estimated Creatinine Clearance: 89 mL/min (based on SCr of 1.2 mg/dL).    Diagnostic Results:  I have reviewed all pertinent imaging results/findings within the past 24 hours.  Assessment and Plan:     Patient is a 39 year old male with stage D 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 He underwent RVAD removal and chest closure 6/30/2022.  He also completed a course of IV Abx for staph epi. He was weaned off  but he had to restarted due to RVF. He was eventually transitioned to milrinone (secondary to  shortage) now on 0.25 mcg/kg/min. He has a history of unclear syncope vs seizures and is followed by neuro for this and is on Keppra.       On 11/15/23 underwent removal of eroded [employer] scICD--no Lifevest (due to LVAD) and no plan to replace ICD as not requiring therapy post LVAD with concern for reinfection.  He has not had neurology f/u with seizure hx on keppra so coordinator to assist him with obtaining appt.  Patient presents for concerns for fever, vomiting and pain and concerns of driveline infection, low INR and hyperglycemia. Beta hydroxybutyrate negative. Lactic acid negative. Normal WBC count and CT Abd/Pelv with contrast without signs of driveline infection. Had elevated blood glucose 03/19/2024 and was advised to go to ED. Also advised to go to ED in the first week of April for fever of 102*F (reported) but did not go at that time. Patient reports that he has been taking lower doses of Lasix at home as compared to what  has been prescribed. Exam consistent with hypervolemia. Bedside TTE with IVC 2.5 cm and non collapsible IVC. No inflow outflow cannula issues visualized.     * Acute on chronic combined systolic and diastolic heart failure  -NICM s/p HM 3 on home Milrinone   -Last 2D Echo  9/5/23 : LVEF 10-15%, LVEDD  7.11 cm with speed at 5100  -Last RHC: 10/31/22 with speed at 5100 on Milrinone 0.125 RA: 18, RV: 35/7 (22), PAP: 35/19 (24), PWP: 21, CO: 3.55, CI: 1.6  -Hypervolemic on examination today  -Start IVP lasix 80mg bid.   -GDMT with N/A  -2g Na dietary restriction, 1500 mL fluid restriction, strict I/Os      Type 2 diabetes mellitus with hyperosmolar hyperglycemic state (HHS)  -management per endocrine      Seizure-like activity  -restarted home Keppra dose 1500 BID  -no seizure activity this hospital stay    LVAD (left ventricular assist device) present  -HeartMate 3 Implanted on 6/29/2022 as DT with RV failure on milrinone at 0.25 mcg/kg/min.  -Continue Coumadin; INR goal 2.0-3.0,  INR is therapeutic today   -Antiplatelets: ASA 81  -LDH is stable.  Will continue to monitor daily  -Speed set at 5100, LSL 4700 rpm  -Echo: 9/5/23 EF: 10-15%, LVEDD: 7.11cm.        Procedure: Device Interrogation Including analysis of device parameters  Current Settings: Ventricular Assist Device  Review of device function is stable        4/23/2024     8:41 AM 4/23/2024     8:40 AM 4/23/2024     5:25 AM 4/23/2024    12:00 AM 4/22/2024     7:00 PM 4/22/2024     4:51 PM 4/22/2024    12:00 PM   TXP LVAD INTERROGATIONS   Type  HeartMate3 HeartMate3 HeartMate3 HeartMate3 HeartMate3 HeartMate3   Flow 4.4  3.8 4.2 4 3.9 4.4   Speed 5100  5100 5100 5100 5150 5100   PI 3.7  5.9 4.5 5.8 5.9 4.7   Power (Serna) 3.6  3.7 3.6 3.6 3.5 3.6   LSL 4700     4750 4700         HTN (hypertension)  -Goal MAP 60-90  -Was consistently above 90 yesterday and Norvasc started     Chronic anticoagulation  -INR 1.9 on admit  -Continue warfarin    Infected defibrillator  now s/p removal Nov 2023  Infected [employer] scICD that was extracted 11/2023  No LifeVest due to VAD present and no hx of VT shocks.    CHF (NYHA class IV, ACC/AHA stage D)  -NICM  -see above     Anemia of chronic disease  -H/H stable        Chantal Herndon MD  Heart Transplant  [facility] - [facility] Step[facility]

## 2024-04-23 NOTE — NURSING
Patient BG check was 447 at 1219 and 419 at 1222.   Susanna Doty PA-C ordered 10 units PRN aspart and to recheck BG in 1 hour, while keeping patient NPO.

## 2024-04-23 NOTE — NURSING
Patient BG recheck at 1351 was 291. Susanna Doty PA-C  notified, okay to eat lunch and give scheduled 18 units of aspart.

## 2024-04-23 NOTE — ASSESSMENT & PLAN NOTE
-NICM s/p HM 3 on home Milrinone   -Last 2D Echo  9/5/23 : LVEF 10-15%, LVEDD  7.11 cm with speed at 5100  -Last RHC: 10/31/22 with speed at 5100 on Milrinone 0.125 RA: 18, RV: 35/7 (22), PAP: 35/19 (24), PWP: 21, CO: 3.55, CI: 1.6  -Hypervolemic on examination today  -Start IVP lasix 80mg bid.   -GDMT with N/A  -2g Na dietary restriction, 1500 mL fluid restriction, strict I/Os

## 2024-04-23 NOTE — ASSESSMENT & PLAN NOTE
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  -Continue Coumadin; INR goal 2.0-3.0,  INR is therapeutic today   -Antiplatelets: ASA 81  -LDH is stable.  Will continue to monitor daily  -Speed set at 5100, LSL 4700 rpm  -Echo: 9/5/23 EF: 10-15%, LVEDD: 7.11cm.        Procedure: Device Interrogation Including analysis of device parameters  Current Settings: Ventricular Assist Device  Review of device function is stable        4/23/2024     8:41 AM 4/23/2024     8:40 AM 4/23/2024     5:25 AM 4/23/2024    12:00 AM 4/22/2024     7:00 PM 4/22/2024     4:51 PM 4/22/2024    12:00 PM   TXP LVAD INTERROGATIONS   Type  HeartMate3 HeartMate3 HeartMate3 HeartMate3 HeartMate3 HeartMate3   Flow 4.4  3.8 4.2 4 3.9 4.4   Speed 5100  5100 5100 5100 5150 5100   PI 3.7  5.9 4.5 5.8 5.9 4.7   Power (Serna) 3.6  3.7 3.6 3.6 3.5 3.6   LSL 4700     4750 4700

## 2024-04-23 NOTE — NURSING
Patients blood sugar reading was 464 at 1230. He was given the scheduled insulin by the night nurse. Blood sugar was then 231 at 0530. Endocrine team notified of blood sugar readings by day RN. Next blood sugar check scheduled for 0800.

## 2024-04-23 NOTE — SUBJECTIVE & OBJECTIVE
"Interval HPI:   Overnight events:No acute events overnight. Patient in room 315/315 A. Blood glucose variable. BG above goal on current insulin regimen (SSI, prandial, and basal insulin ). Steroid use- None.      Renal function- Normal   Vasopressors-  None     Diet diabetic  Calorie     Eatin%  Nausea: No  Hypoglycemia and intervention: No  Fever: No  TPN and/or TF: No      BP (!) 80/0   Pulse 100   Temp 97.7 °F (36.5 °C) (Oral)   Resp 18   Ht 6' 3" (1.905 m)   Wt 76.1 kg (167 lb 12.3 oz)   SpO2 98%   BMI 20.97 kg/m²     Labs Reviewed and Include    Recent Labs   Lab 24  0538   *   CALCIUM 8.9   ALBUMIN 2.6*   PROT 6.9   *   K 4.3   CO2 26      BUN 13   CREATININE 1.2   ALKPHOS 180*   ALT 17   AST 43*   BILITOT 1.6*     Lab Results   Component Value Date    WBC 6.35 2024    HGB 7.6 (L) 2024    HCT 28.5 (L) 2024    MCV 62 (L) 2024     2024     No results for input(s): "TSH", "FREET4" in the last 168 hours.  Lab Results   Component Value Date    HGBA1C >14.0 (H) 2024       Nutritional status:   Body mass index is 20.97 kg/m².  Lab Results   Component Value Date    ALBUMIN 2.6 (L) 2024    ALBUMIN 2.5 (L) 2024    ALBUMIN 2.6 (L) 2024     Lab Results   Component Value Date    PREALBUMIN 9 (L) 2024    PREALBUMIN 14 (L) 2024    PREALBUMIN 21 11/15/2023       Estimated Creatinine Clearance: 89 mL/min (based on SCr of 1.2 mg/dL).    Accu-Checks  Recent Labs     24  0633 24  0831 24  1146 24  1659 24  2102 24  0023 24  0024 24  0034 24  0534 24  0830   POCTGLUCOSE 288* 196* 148* 163* 156* 443* 422* 464* 231* 227*       Current Medications and/or Treatments Impacting Glycemic Control  Immunotherapy:    Immunosuppressants       None          Steroids:   Hormones (From admission, onward)      None          Pressors:    Autonomic Drugs (From admission, " onward)      None          Hyperglycemia/Diabetes Medications:   Antihyperglycemics (From admission, onward)      Start     Stop Route Frequency Ordered    04/23/24 2100  insulin detemir U-100 (Levemir) pen 40 Units         -- SubQ Nightly 04/23/24 0836    04/22/24 1130  insulin aspart U-100 pen 14 Units         -- SubQ 3 times daily with meals 04/22/24 0857    04/20/24 2100  insulin regular in 0.9 % NaCl 100 unit/100 mL (1 unit/mL) infusion        Question:  Enter initial dose (Units/hr):  Answer:  1.7    04/22/24 2100 IV Continuous 04/20/24 1947 04/20/24 2046  insulin aspart U-100 pen 0-10 Units         -- SubQ Before meals, nightly and at 0200 PRN 04/20/24 1947

## 2024-04-23 NOTE — SUBJECTIVE & OBJECTIVE
Interval History: NAEON. No acute complaints. CVP 13.     Continuous Infusions:  Current Facility-Administered Medications   Medication Dose Route Frequency Last Rate Last Admin    dextrose 5 % and 0.45 % NaCl   Intravenous Continuous PRN        milrinone 20mg/100ml D5W (200mcg/ml)  0.25 mcg/kg/min Intravenous Continuous 7.1 mL/hr at 04/23/24 0649 0.25 mcg/kg/min at 04/23/24 0649     Scheduled Meds:  Current Facility-Administered Medications   Medication Dose Route Frequency    amLODIPine  5 mg Oral Daily    aspirin  81 mg Oral Daily    atorvastatin  40 mg Oral Daily    baclofen  5 mg Oral TID    furosemide (LASIX) injection  80 mg Intravenous Q12H    gabapentin  300 mg Oral Daily    insulin aspart U-100  14 Units Subcutaneous TIDWM    insulin detemir U-100  40 Units Subcutaneous QHS    levETIRAcetam  1,500 mg Oral BID    magnesium oxide  400 mg Oral Daily    magnesium sulfate IVPB  2 g Intravenous Once    mirtazapine  15 mg Oral Nightly    mupirocin   Nasal BID    pantoprazole  40 mg Oral Daily    sodium chloride 0.9%  10 mL Intravenous Q6H    spironolactone  25 mg Oral Daily    warfarin  2.5 mg Oral Daily     PRN Meds:  Current Facility-Administered Medications:     acetaminophen, 650 mg, Oral, Q6H PRN    cyclobenzaprine, 5 mg, Oral, TID PRN    dextrose 10%, 12.5 g, Intravenous, PRN    dextrose 10%, 25 g, Intravenous, PRN    dextrose 5 % and 0.45 % NaCl, , Intravenous, Continuous PRN    glucagon (human recombinant), 1 mg, Intramuscular, PRN    glucose, 16 g, Oral, PRN    glucose, 24 g, Oral, PRN    insulin aspart U-100, 0-10 Units, Subcutaneous, AC + HS + 0200 PRN    ondansetron, 4 mg, Oral, Q8H PRN    sodium chloride 0.9%, 10 mL, Intravenous, PRN    Flushing PICC/Midline Protocol, , , Until Discontinued **AND** sodium chloride 0.9%, 10 mL, Intravenous, Q6H **AND** sodium chloride 0.9%, 10 mL, Intravenous, PRN    traMADoL, 50 mg, Oral, Q6H PRN    Review of patient's allergies indicates:   Allergen Reactions     Bumex [bumetanide] Hives    Torsemide Hives     Objective:     Vital Signs (Most Recent):  Temp: 97.7 °F (36.5 °C) (04/23/24 0752)  Pulse: 100 (04/23/24 0753)  Resp: 18 (04/23/24 0752)  BP: (!) 80/0 (04/23/24 0800)  SpO2: 98 % (04/23/24 0752) Vital Signs (24h Range):  Temp:  [97.7 °F (36.5 °C)-98.3 °F (36.8 °C)] 97.7 °F (36.5 °C)  Pulse:  [] 100  Resp:  [18] 18  SpO2:  [98 %] 98 %  BP: ()/(0-73) 80/0     Patient Vitals for the past 72 hrs (Last 3 readings):   Weight   04/23/24 0429 76.1 kg (167 lb 12.3 oz)   04/21/24 0506 81.1 kg (178 lb 13.4 oz)     Body mass index is 20.97 kg/m².      Intake/Output Summary (Last 24 hours) at 4/23/2024 0951  Last data filed at 4/23/2024 0428  Gross per 24 hour   Intake 684 ml   Output 5100 ml   Net -4416 ml       Hemodynamic Parameters:       Telemetry: reviewed     Physical Exam  Vitals and nursing note reviewed.   Constitutional:       Appearance: Normal appearance.   HENT:      Head: Normocephalic and atraumatic.      Nose: Nose normal.   Eyes:      Pupils: Pupils are equal, round, and reactive to light.   Neck:      Vascular: JVD present.      Comments: JVP midneck at 45 degrees.   Cardiovascular:      Rate and Rhythm: Normal rate and regular rhythm.      Comments: Smooth VAD hum  Pulmonary:      Effort: Pulmonary effort is normal.      Breath sounds: Normal breath sounds.   Abdominal:      General: Abdomen is flat. There is no distension.      Palpations: Abdomen is soft.   Musculoskeletal:         General: Normal range of motion.   Skin:     General: Skin is warm and dry.      Capillary Refill: Capillary refill takes 2 to 3 seconds.   Neurological:      General: No focal deficit present.      Mental Status: He is alert and oriented to person, place, and time.   Psychiatric:         Mood and Affect: Mood normal.         Behavior: Behavior normal.            Significant Labs:  CBC:  Recent Labs   Lab 04/21/24  1511 04/22/24  0516 04/23/24  0538   WBC 6.67 5.87 6.35    RBC 5.34 4.67 4.59*   HGB 8.8* 7.8* 7.6*   HCT 32.3* 28.4* 28.5*    269 250   MCV 61* 61* 62*   MCH 16.5* 16.7* 16.6*   MCHC 27.2* 27.5* 26.7*     BNP:  Recent Labs   Lab 04/22/24  0516   *     CMP:  Recent Labs   Lab 04/21/24  1700 04/22/24  0516 04/22/24  0749 04/22/24  1445 04/23/24  0538   *  --  241*  --  211*   CALCIUM 8.8  --  8.7  --  8.9   ALBUMIN 2.6* 2.5*  --   --  2.6*   PROT 7.3 6.8  --   --  6.9   *  --  133*  --  135*   K 3.6  --  4.1 3.8 4.3   CO2 26  --  25  --  26   CL 99  --  102  --  102   BUN 8  --  10  --  13   CREATININE 1.0  --  1.0  --  1.2   ALKPHOS 167* 170*  --   --  180*   ALT 15 17  --   --  17   AST 44* 43*  --   --  43*   BILITOT 2.5* 2.0*  --   --  1.6*      Coagulation:   Recent Labs   Lab 04/21/24  0519 04/22/24  0516 04/23/24  0538   INR 2.2* 2.7* 2.5*   APTT 32.5* 34.3* 34.3*     LDH:  Recent Labs   Lab 04/21/24  0519 04/22/24  0516 04/23/24  0538   * 263* 279*     Microbiology:  Microbiology Results (last 7 days)       Procedure Component Value Units Date/Time    Blood culture x two cultures. Draw prior to antibiotics. [[id number]] Collected: 04/20/24 0210    Order Status: Completed Specimen: Blood from Peripheral, Hand, Left Updated: 04/23/24 0612     Blood Culture, Routine No Growth to date      No Growth to date      No Growth to date      No Growth to date    Narrative:      Aerobic and anaerobic    Blood culture x two cultures. Draw prior to antibiotics. [[id number]] Collected: 04/20/24 0209    Order Status: Completed Specimen: Blood from Peripheral, Antecubital, Left Updated: 04/23/24 0612     Blood Culture, Routine No Growth to date      No Growth to date      No Growth to date      No Growth to date    Narrative:      Aerobic and anaerobic            I have reviewed all pertinent labs within the past 24 hours.    Estimated Creatinine Clearance: 89 mL/min (based on SCr of 1.2 mg/dL).    Diagnostic Results:  I have reviewed all pertinent  imaging results/findings within the past 24 hours.

## 2024-04-23 NOTE — PROGRESS NOTES
[facility] - Cardiology Stepdown  Heart Transplant  Progress Note    Patient Name: Kevan Queen  MRN: [id number]  Admission Date: 4/20/2024  Hospital Length of Stay: 3 days  Attending Physician: Natalya Villatoro MD  Primary Care Provider: Rosio Armendariz FNP  Principal Problem:Acute on chronic combined systolic and diastolic heart failure    Subjective:   Interval History: NAEON. No acute complaints. CVP 13.     Continuous Infusions:  Current Facility-Administered Medications   Medication Dose Route Frequency Last Rate Last Admin    dextrose 5 % and 0.45 % NaCl   Intravenous Continuous PRN        milrinone 20mg/100ml D5W (200mcg/ml)  0.25 mcg/kg/min Intravenous Continuous 7.1 mL/hr at 04/23/24 0649 0.25 mcg/kg/min at 04/23/24 0649     Scheduled Meds:  Current Facility-Administered Medications   Medication Dose Route Frequency    amLODIPine  5 mg Oral Daily    aspirin  81 mg Oral Daily    atorvastatin  40 mg Oral Daily    baclofen  5 mg Oral TID    furosemide (LASIX) injection  80 mg Intravenous Q12H    gabapentin  300 mg Oral Daily    insulin aspart U-100  14 Units Subcutaneous TIDWM    insulin detemir U-100  40 Units Subcutaneous QHS    levETIRAcetam  1,500 mg Oral BID    magnesium oxide  400 mg Oral Daily    magnesium sulfate IVPB  2 g Intravenous Once    mirtazapine  15 mg Oral Nightly    mupirocin   Nasal BID    pantoprazole  40 mg Oral Daily    sodium chloride 0.9%  10 mL Intravenous Q6H    spironolactone  25 mg Oral Daily    warfarin  2.5 mg Oral Daily     PRN Meds:  Current Facility-Administered Medications:     acetaminophen, 650 mg, Oral, Q6H PRN    cyclobenzaprine, 5 mg, Oral, TID PRN    dextrose 10%, 12.5 g, Intravenous, PRN    dextrose 10%, 25 g, Intravenous, PRN    dextrose 5 % and 0.45 % NaCl, , Intravenous, Continuous PRN    glucagon (human recombinant), 1 mg, Intramuscular, PRN    glucose, 16 g, Oral, PRN    glucose, 24 g, Oral, PRN    insulin aspart U-100, 0-10 Units, Subcutaneous, AC + HS +  0200 PRN    ondansetron, 4 mg, Oral, Q8H PRN    sodium chloride 0.9%, 10 mL, Intravenous, PRN    Flushing PICC/Midline Protocol, , , Until Discontinued **AND** sodium chloride 0.9%, 10 mL, Intravenous, Q6H **AND** sodium chloride 0.9%, 10 mL, Intravenous, PRN    traMADoL, 50 mg, Oral, Q6H PRN    Review of patient's allergies indicates:   Allergen Reactions    Bumex [bumetanide] Hives    Torsemide Hives     Objective:     Vital Signs (Most Recent):  Temp: 97.7 °F (36.5 °C) (04/23/24 0752)  Pulse: 100 (04/23/24 0753)  Resp: 18 (04/23/24 0752)  BP: (!) 80/0 (04/23/24 0800)  SpO2: 98 % (04/23/24 0752) Vital Signs (24h Range):  Temp:  [97.7 °F (36.5 °C)-98.3 °F (36.8 °C)] 97.7 °F (36.5 °C)  Pulse:  [] 100  Resp:  [18] 18  SpO2:  [98 %] 98 %  BP: ()/(0-73) 80/0     Patient Vitals for the past 72 hrs (Last 3 readings):   Weight   04/23/24 0429 76.1 kg (167 lb 12.3 oz)   04/21/24 0506 81.1 kg (178 lb 13.4 oz)     Body mass index is 20.97 kg/m².      Intake/Output Summary (Last 24 hours) at 4/23/2024 0951  Last data filed at 4/23/2024 0428  Gross per 24 hour   Intake 684 ml   Output 5100 ml   Net -4416 ml       Hemodynamic Parameters:       Telemetry: reviewed     Physical Exam  Vitals and nursing note reviewed.   Constitutional:       Appearance: Normal appearance.   HENT:      Head: Normocephalic and atraumatic.      Nose: Nose normal.   Eyes:      Pupils: Pupils are equal, round, and reactive to light.   Neck:      Vascular: JVD present.      Comments: JVP midneck at 45 degrees.   Cardiovascular:      Rate and Rhythm: Normal rate and regular rhythm.      Comments: Smooth VAD hum  Pulmonary:      Effort: Pulmonary effort is normal.      Breath sounds: Normal breath sounds.   Abdominal:      General: Abdomen is flat. There is no distension.      Palpations: Abdomen is soft.   Musculoskeletal:         General: Normal range of motion.   Skin:     General: Skin is warm and dry.      Capillary Refill: Capillary  refill takes 2 to 3 seconds.   Neurological:      General: No focal deficit present.      Mental Status: He is alert and oriented to person, place, and time.   Psychiatric:         Mood and Affect: Mood normal.         Behavior: Behavior normal.            Significant Labs:  CBC:  Recent Labs   Lab 04/21/24  1511 04/22/24  0516 04/23/24  0538   WBC 6.67 5.87 6.35   RBC 5.34 4.67 4.59*   HGB 8.8* 7.8* 7.6*   HCT 32.3* 28.4* 28.5*    269 250   MCV 61* 61* 62*   MCH 16.5* 16.7* 16.6*   MCHC 27.2* 27.5* 26.7*     BNP:  Recent Labs   Lab 04/22/24  0516   *     CMP:  Recent Labs   Lab 04/21/24  1700 04/22/24  0516 04/22/24  0749 04/22/24  1445 04/23/24  0538   *  --  241*  --  211*   CALCIUM 8.8  --  8.7  --  8.9   ALBUMIN 2.6* 2.5*  --   --  2.6*   PROT 7.3 6.8  --   --  6.9   *  --  133*  --  135*   K 3.6  --  4.1 3.8 4.3   CO2 26  --  25  --  26   CL 99  --  102  --  102   BUN 8  --  10  --  13   CREATININE 1.0  --  1.0  --  1.2   ALKPHOS 167* 170*  --   --  180*   ALT 15 17  --   --  17   AST 44* 43*  --   --  43*   BILITOT 2.5* 2.0*  --   --  1.6*      Coagulation:   Recent Labs   Lab 04/21/24  0519 04/22/24  0516 04/23/24  0538   INR 2.2* 2.7* 2.5*   APTT 32.5* 34.3* 34.3*     LDH:  Recent Labs   Lab 04/21/24  0519 04/22/24  0516 04/23/24  0538   * 263* 279*     Microbiology:  Microbiology Results (last 7 days)       Procedure Component Value Units Date/Time    Blood culture x two cultures. Draw prior to antibiotics. [[id number]] Collected: 04/20/24 0210    Order Status: Completed Specimen: Blood from Peripheral, Hand, Left Updated: 04/23/24 0612     Blood Culture, Routine No Growth to date      No Growth to date      No Growth to date      No Growth to date    Narrative:      Aerobic and anaerobic    Blood culture x two cultures. Draw prior to antibiotics. [[id number]] Collected: 04/20/24 0209    Order Status: Completed Specimen: Blood from Peripheral, Antecubital, Left Updated:  04/23/24 0612     Blood Culture, Routine No Growth to date      No Growth to date      No Growth to date      No Growth to date    Narrative:      Aerobic and anaerobic            I have reviewed all pertinent labs within the past 24 hours.    Estimated Creatinine Clearance: 89 mL/min (based on SCr of 1.2 mg/dL).    Diagnostic Results:  I have reviewed all pertinent imaging results/findings within the past 24 hours.  Assessment and Plan:     Patient is a 39 year old male with stage D 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 He underwent RVAD removal and chest closure 6/30/2022.  He also completed a course of IV Abx for staph epi. He was weaned off  but he had to restarted due to RVF. He was eventually transitioned to milrinone (secondary to  shortage) now on 0.25 mcg/kg/min. He has a history of unclear syncope vs seizures and is followed by neuro for this and is on Keppra.       On 11/15/23 underwent removal of eroded [employer] scICD--no Lifevest (due to LVAD) and no plan to replace ICD as not requiring therapy post LVAD with concern for reinfection.  He has not had neurology f/u with seizure hx on keppra so coordinator to assist him with obtaining appt.  Patient presents for concerns for fever, vomiting and pain and concerns of driveline infection, low INR and hyperglycemia. Beta hydroxybutyrate negative. Lactic acid negative. Normal WBC count and CT Abd/Pelv with contrast without signs of driveline infection. Had elevated blood glucose 03/19/2024 and was advised to go to ED. Also advised to go to ED in the first week of April for fever of 102*F (reported) but did not go at that time. Patient reports that he has been taking lower doses of Lasix at home as compared to what  has been prescribed. Exam consistent with hypervolemia. Bedside TTE with IVC 2.5 cm and non collapsible IVC. No inflow outflow cannula issues visualized.     * Acute on chronic combined systolic and diastolic heart failure  -NICM s/p HM 3 on home Milrinone   -Last 2D Echo  9/5/23 : LVEF 10-15%, LVEDD  7.11 cm with speed at 5100  -Last RHC: 10/31/22 with speed at 5100 on Milrinone 0.125 RA: 18, RV: 35/7 (22), PAP: 35/19 (24), PWP: 21, CO: 3.55, CI: 1.6  -Hypervolemic on examination today  -Start IVP lasix 80mg bid.   -GDMT with N/A  -2g Na dietary restriction, 1500 mL fluid restriction, strict I/Os      Type 2 diabetes mellitus with hyperosmolar hyperglycemic state (HHS)  -management per endocrine      Seizure-like activity  -restarted home Keppra dose 1500 BID  -no seizure activity this hospital stay    LVAD (left ventricular assist device) present  -HeartMate 3 Implanted on 6/29/2022 as DT with RV failure on milrinone at 0.25 mcg/kg/min.  -Continue Coumadin; INR goal 2.0-3.0,  INR is therapeutic today   -Antiplatelets: ASA 81  -LDH is stable.  Will continue to monitor daily  -Speed set at 5100, LSL 4700 rpm  -Echo: 9/5/23 EF: 10-15%, LVEDD: 7.11cm.        Procedure: Device Interrogation Including analysis of device parameters  Current Settings: Ventricular Assist Device  Review of device function is stable        4/22/2024     8:32 AM 4/22/2024     5:00 AM 4/21/2024    11:27 PM 4/21/2024     7:05 PM 4/21/2024     5:13 PM 4/21/2024    12:02 PM 4/21/2024     7:50 AM   TXP LVAD INTERROGATIONS   Type HeartMate3 HeartMate3 HeartMate3 HeartMate3 HeartMate3 HeartMate3 HeartMate3   Flow 3.8 3.9 4.5 3.9 4.4 4.1 4.1   Speed 5100 5100 5100 5100 5100 5100 5100   PI 5.7 5.9 3.9 5.5 4.4 5.6 4.7   Power (Serna) 3.6 3.5 3.6 3.5 3.6 3.6 3.7   LSL 4700    4700 4700 4700   Pulsatility    Intermittent pulse Intermittent pulse Intermittent pulse Intermittent pulse         HTN (hypertension)  -Goal MAP 60-90  -Was consistently above 90  yesterday and Norvasc started     Chronic anticoagulation  -INR 1.9 on admit  -Continue warfarin    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    CHF (NYHA class IV, ACC/AHA stage D)  -NICM  -see above     Anemia of chronic disease  -H/H stable        Chantal Herndon MD  Heart Transplant  [facility] - Cardiology Stepdown

## 2024-04-23 NOTE — ASSESSMENT & PLAN NOTE
BG goal 140-180     - Levemir 40 units nightly (20% increase due to fasting blood glucose above goal)  - Novolog (aspart) insulin 14 Units SQ TIDWM and prn for BG excursions MDC SSI (150/25).  - BG checks AC/HS/0200  - Hypoglycemia protocol in place    ** Please notify Endocrine for any change and/or advance in diet**  ** Please call Endocrine for any BG related issues **    Discharge Planning:   TBD. Please notify endocrinology prior to discharge.

## 2024-04-23 NOTE — PROGRESS NOTES
04/23/2024  Mirela Perez    Current provider:  Natalya Villatoro MD    Device interrogation:      4/23/2024     5:25 AM 4/23/2024    12:00 AM 4/22/2024     7:00 PM 4/22/2024     4:51 PM 4/22/2024    12:00 PM 4/22/2024     8:32 AM 4/22/2024     5:00 AM   TXP LVAD INTERROGATIONS   Type HeartMate3 HeartMate3 HeartMate3 HeartMate3 HeartMate3 HeartMate3 HeartMate3   Flow 3.8 4.2 4 3.9 4.4 3.8 3.9   Speed 5100 5100 5100 5150 5100 5100 5100   PI 5.9 4.5 5.8 5.9 4.7 5.7 5.9   Power (Serna) 3.7 3.6 3.6 3.5 3.6 3.6 3.5   LSL    4750 4700 4700           Rounded on Kevan Queen to ensure all mechanical assist device settings (IABP or VAD) were appropriate and all parameters were within limits.  I was able to ensure all back up equipment was present, the staff had no issues, and the Perfusion Department daily rounding was complete.      For implantable VADs: Interrogation of Ventricular assist device was performed with analysis of device parameters and review of device function. I have personally reviewed the interrogation findings and agree with findings as stated.     In emergency, the nursing units have been notified to contact the perfusion department either by:  Calling [id number] from 630am to 4pm Mon thru Fri, utilizing the On-Call Finder functionality of Epic and searching for Perfusion, or by contacting the hospital  from 4pm to 630am and on weekends and asking to speak with the perfusionist on call.    7:06 AM

## 2024-04-23 NOTE — NURSING
-1553 Patient called for having 10 out of 10 muscle cramping in the chest and legs. Doppler BP 78/0. Charge nurse at bedside    - 1602 MD Herndon called. BMP, Mg labs ordered. Oral potassium ordered and given along with PRN tylenol.     -1630 Patient stated the cramping was resolving, rating it 5 out of 10.

## 2024-04-23 NOTE — PLAN OF CARE
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    Acute on chronic combined systolic and diastolic heart failure    Insomnia    Tachycardia    Palliative care encounter    Familial dilated cardiomyopathy    LVAD (left ventricular assist device) present    On mechanically assisted ventilation    Hyponatremia    Seizure-like activity    Temporal lobe seizure    Right heart failure secondary to left heart failure-post LVAD surgery    History of substance abuse    Infected defibrillator now s/p removal Nov 2023    Pleural effusion    Noncompliance with medication regimen    Moderate malnutrition    Receiving inotropic medication    Chronic anticoagulation    Type 2 diabetes mellitus with hyperosmolar hyperglycemic state (HHS)    HTN (hypertension)    Acute decompensated heart failure    Type 2 diabetes mellitus with hyperglycemia, with long-term current use of insulin       Patient is homebound due to:   Diet: Low Sodium  Acitivities: As Tolerated    Nursing:   SN to complete comprehensive assessment including routine vital signs. Instruct on disease process and s/s of complications to report to MD. Review/verify medication list sent home with the patient at time of discharge  and instruct patient/caregiver as needed. Frequency may be adjusted depending on start of care date.    Notify MD if SBP > 160 or < 90; DBP > 90 or < 50; HR > 120 or < 50; Temp > 101; Weight gain >3lbs in 1 day or 5lbs in 1 week.    LABS:  SN to perform labs: weekly cbc, cmp, mg    HOME INFUSION THERAPY:   SN to perform Infusion  The electroanatomical mapping system was used to create the activation map of the arrhythmia.   Therapy/Central Line Care.  Review Central Line Care & Central Line Flush with patient.    Administer (drug and dose): IV milrinone 0.25 mcg/kg/min x 94.4kg    **For questions or concerns, please call [phone] and ask for Pre-Heart transplant clinic, M-F 8-5. After hours, weekends, call [phone] and ask for the Heart Transplant Cardiologist on call.**    Central line care:  Scrub the Hub: Prior to accessing the line, always perform a 30 second alcohol scrub  Each lumen of the central line is to be flushed at least daily with 10 mL Normal Saline and 3 mL Heparin flush (100 units/mL)  Skilled Nurse (SN) may draw blood from IV access  Blood Draw Procedure:   - Aspirate at least 5 mL of blood   - Discard   - Obtain specimen   - Change posiflow cap   - Flush with 20 mL Normal Saline followed by a                 3-5 mL Heparin flush (100 units/mL)  :   - Sterile dressing changes are done weekly and as needed.   - Use chlor-hexadine scrub to cleanse site, apply Biopatch to insertion site,       apply securement device dressing   - Posi-flow caps are changed weekly and after EVERY lab draw.   - If sterile gauze is under dressing to control oozing,                 dressing change must be performed every 24 hours until gauze is not needed.        Send initial Home Health orders to [facility] attending physician on call.  Send follow up questions to [phone] or fax:              Heart Failure:      [phone]    .

## 2024-04-24 LAB
ALBUMIN SERPL BCP-MCNC: 2.8 G/DL (ref 3.5–5.2)
ALP SERPL-CCNC: 186 U/L (ref 55–135)
ALT SERPL W/O P-5'-P-CCNC: 23 U/L (ref 10–44)
ANION GAP SERPL CALC-SCNC: 7 MMOL/L (ref 8–16)
APTT PPP: 33.3 SEC (ref 21–32)
AST SERPL-CCNC: 42 U/L (ref 10–40)
BASOPHILS # BLD AUTO: 0.05 K/UL (ref 0–0.2)
BASOPHILS NFR BLD: 0.5 % (ref 0–1.9)
BILIRUB SERPL-MCNC: 1.7 MG/DL (ref 0.1–1)
BNP SERPL-MCNC: 285 PG/ML (ref 0–99)
BUN SERPL-MCNC: 15 MG/DL (ref 6–20)
CALCIUM SERPL-MCNC: 8.9 MG/DL (ref 8.7–10.5)
CHLORIDE SERPL-SCNC: 99 MMOL/L (ref 95–110)
CO2 SERPL-SCNC: 27 MMOL/L (ref 23–29)
CREAT SERPL-MCNC: 1.1 MG/DL (ref 0.5–1.4)
DIFFERENTIAL METHOD BLD: ABNORMAL
EOSINOPHIL # BLD AUTO: 0.1 K/UL (ref 0–0.5)
EOSINOPHIL NFR BLD: 0.9 % (ref 0–8)
ERYTHROCYTE [DISTWIDTH] IN BLOOD BY AUTOMATED COUNT: 25.1 % (ref 11.5–14.5)
EST. GFR  (NO RACE VARIABLE): >60 ML/MIN/1.73 M^2
GLUCOSE SERPL-MCNC: 123 MG/DL (ref 70–110)
HCT VFR BLD AUTO: 28.6 % (ref 40–54)
HGB BLD-MCNC: 7.8 G/DL (ref 14–18)
IMM GRANULOCYTES # BLD AUTO: 0.03 K/UL (ref 0–0.04)
IMM GRANULOCYTES NFR BLD AUTO: 0.3 % (ref 0–0.5)
INR PPP: 2 (ref 0.8–1.2)
LDH SERPL L TO P-CCNC: 267 U/L (ref 110–260)
LYMPHOCYTES # BLD AUTO: 1.8 K/UL (ref 1–4.8)
LYMPHOCYTES NFR BLD: 17.4 % (ref 18–48)
MAGNESIUM SERPL-MCNC: 1.8 MG/DL (ref 1.6–2.6)
MCH RBC QN AUTO: 16.9 PG (ref 27–31)
MCHC RBC AUTO-ENTMCNC: 27.3 G/DL (ref 32–36)
MCV RBC AUTO: 62 FL (ref 82–98)
MONOCYTES # BLD AUTO: 1.1 K/UL (ref 0.3–1)
MONOCYTES NFR BLD: 11 % (ref 4–15)
NEUTROPHILS # BLD AUTO: 7.2 K/UL (ref 1.8–7.7)
NEUTROPHILS NFR BLD: 69.9 % (ref 38–73)
NRBC BLD-RTO: 0 /100 WBC
PLATELET # BLD AUTO: 257 K/UL (ref 150–450)
PMV BLD AUTO: ABNORMAL FL (ref 9.2–12.9)
POCT GLUCOSE: 144 MG/DL (ref 70–110)
POCT GLUCOSE: 183 MG/DL (ref 70–110)
POCT GLUCOSE: 264 MG/DL (ref 70–110)
POCT GLUCOSE: 286 MG/DL (ref 70–110)
POCT GLUCOSE: 359 MG/DL (ref 70–110)
POCT GLUCOSE: 363 MG/DL (ref 70–110)
POTASSIUM SERPL-SCNC: 3.8 MMOL/L (ref 3.5–5.1)
POTASSIUM SERPL-SCNC: 3.9 MMOL/L (ref 3.5–5.1)
POTASSIUM SERPL-SCNC: 4 MMOL/L (ref 3.5–5.1)
PREALB SERPL-MCNC: 13 MG/DL (ref 20–43)
PROT SERPL-MCNC: 7.4 G/DL (ref 6–8.4)
PROTHROMBIN TIME: 21.4 SEC (ref 9–12.5)
RBC # BLD AUTO: 4.62 M/UL (ref 4.6–6.2)
SODIUM SERPL-SCNC: 133 MMOL/L (ref 136–145)
WBC # BLD AUTO: 10.23 K/UL (ref 3.9–12.7)

## 2024-04-24 PROCEDURE — 84132 ASSAY OF SERUM POTASSIUM: CPT | Performed by: STUDENT IN AN ORGANIZED HEALTH CARE EDUCATION/TRAINING PROGRAM

## 2024-04-24 PROCEDURE — 25000003 PHARM REV CODE 250: Performed by: PHYSICIAN ASSISTANT

## 2024-04-24 PROCEDURE — 25000003 PHARM REV CODE 250: Performed by: STUDENT IN AN ORGANIZED HEALTH CARE EDUCATION/TRAINING PROGRAM

## 2024-04-24 PROCEDURE — 27000248 HC VAD-ADDITIONAL DAY

## 2024-04-24 PROCEDURE — 85610 PROTHROMBIN TIME: CPT | Performed by: INTERNAL MEDICINE

## 2024-04-24 PROCEDURE — 99232 SBSQ HOSP IP/OBS MODERATE 35: CPT | Mod: ,,,

## 2024-04-24 PROCEDURE — 83735 ASSAY OF MAGNESIUM: CPT | Mod: 91 | Performed by: STUDENT IN AN ORGANIZED HEALTH CARE EDUCATION/TRAINING PROGRAM

## 2024-04-24 PROCEDURE — 83735 ASSAY OF MAGNESIUM: CPT | Performed by: INTERNAL MEDICINE

## 2024-04-24 PROCEDURE — A4216 STERILE WATER/SALINE, 10 ML: HCPCS | Performed by: INTERNAL MEDICINE

## 2024-04-24 PROCEDURE — 20600001 HC STEP DOWN PRIVATE ROOM

## 2024-04-24 PROCEDURE — 83615 LACTATE (LD) (LDH) ENZYME: CPT | Performed by: INTERNAL MEDICINE

## 2024-04-24 PROCEDURE — 63600175 PHARM REV CODE 636 W HCPCS: Performed by: STUDENT IN AN ORGANIZED HEALTH CARE EDUCATION/TRAINING PROGRAM

## 2024-04-24 PROCEDURE — 63600175 PHARM REV CODE 636 W HCPCS: Performed by: INTERNAL MEDICINE

## 2024-04-24 PROCEDURE — 99233 SBSQ HOSP IP/OBS HIGH 50: CPT | Mod: ,,, | Performed by: INTERNAL MEDICINE

## 2024-04-24 PROCEDURE — 25000003 PHARM REV CODE 250: Performed by: INTERNAL MEDICINE

## 2024-04-24 PROCEDURE — 93750 INTERROGATION VAD IN PERSON: CPT | Mod: ,,, | Performed by: INTERNAL MEDICINE

## 2024-04-24 PROCEDURE — 85730 THROMBOPLASTIN TIME PARTIAL: CPT | Performed by: INTERNAL MEDICINE

## 2024-04-24 PROCEDURE — 83880 ASSAY OF NATRIURETIC PEPTIDE: CPT | Performed by: INTERNAL MEDICINE

## 2024-04-24 PROCEDURE — 85025 COMPLETE CBC W/AUTO DIFF WBC: CPT | Performed by: INTERNAL MEDICINE

## 2024-04-24 PROCEDURE — 84134 ASSAY OF PREALBUMIN: CPT | Performed by: INTERNAL MEDICINE

## 2024-04-24 PROCEDURE — 80053 COMPREHEN METABOLIC PANEL: CPT | Performed by: STUDENT IN AN ORGANIZED HEALTH CARE EDUCATION/TRAINING PROGRAM

## 2024-04-24 RX ORDER — HYDROCODONE BITARTRATE AND ACETAMINOPHEN 5; 325 MG/1; MG/1
1 TABLET ORAL ONCE
Status: COMPLETED | OUTPATIENT
Start: 2024-04-24 | End: 2024-04-24

## 2024-04-24 RX ORDER — POTASSIUM CHLORIDE 20 MEQ/1
40 TABLET, EXTENDED RELEASE ORAL ONCE
Status: COMPLETED | OUTPATIENT
Start: 2024-04-24 | End: 2024-04-24

## 2024-04-24 RX ORDER — FUROSEMIDE 10 MG/ML
80 INJECTION INTRAMUSCULAR; INTRAVENOUS EVERY 8 HOURS
Status: DISCONTINUED | OUTPATIENT
Start: 2024-04-24 | End: 2024-04-25

## 2024-04-24 RX ORDER — MAGNESIUM SULFATE HEPTAHYDRATE 40 MG/ML
2 INJECTION, SOLUTION INTRAVENOUS ONCE
Status: COMPLETED | OUTPATIENT
Start: 2024-04-24 | End: 2024-04-24

## 2024-04-24 RX ORDER — FUROSEMIDE 10 MG/ML
80 INJECTION INTRAMUSCULAR; INTRAVENOUS ONCE
Status: COMPLETED | OUTPATIENT
Start: 2024-04-24 | End: 2024-04-24

## 2024-04-24 RX ORDER — FUROSEMIDE 10 MG/ML
80 INJECTION INTRAMUSCULAR; INTRAVENOUS ONCE
Status: DISCONTINUED | OUTPATIENT
Start: 2024-04-24 | End: 2024-04-24

## 2024-04-24 RX ORDER — FUROSEMIDE 10 MG/ML
80 INJECTION INTRAMUSCULAR; INTRAVENOUS EVERY 12 HOURS
Status: DISCONTINUED | OUTPATIENT
Start: 2024-04-24 | End: 2024-04-24

## 2024-04-24 RX ORDER — INSULIN ASPART 100 [IU]/ML
20 INJECTION, SOLUTION INTRAVENOUS; SUBCUTANEOUS
Status: DISCONTINUED | OUTPATIENT
Start: 2024-04-24 | End: 2024-04-25

## 2024-04-24 RX ORDER — CYCLOBENZAPRINE HCL 5 MG
5 TABLET ORAL 3 TIMES DAILY PRN
Status: DISCONTINUED | OUTPATIENT
Start: 2024-04-24 | End: 2024-04-27 | Stop reason: HOSPADM

## 2024-04-24 RX ADMIN — INSULIN ASPART 18 UNITS: 100 INJECTION, SOLUTION INTRAVENOUS; SUBCUTANEOUS at 12:04

## 2024-04-24 RX ADMIN — INSULIN ASPART 20 UNITS: 100 INJECTION, SOLUTION INTRAVENOUS; SUBCUTANEOUS at 05:04

## 2024-04-24 RX ADMIN — INSULIN ASPART 3 UNITS: 100 INJECTION, SOLUTION INTRAVENOUS; SUBCUTANEOUS at 02:04

## 2024-04-24 RX ADMIN — INSULIN ASPART 2 UNITS: 100 INJECTION, SOLUTION INTRAVENOUS; SUBCUTANEOUS at 08:04

## 2024-04-24 RX ADMIN — MAGNESIUM SULFATE HEPTAHYDRATE 2 G: 40 INJECTION, SOLUTION INTRAVENOUS at 10:04

## 2024-04-24 RX ADMIN — FUROSEMIDE 80 MG: 10 INJECTION, SOLUTION INTRAVENOUS at 10:04

## 2024-04-24 RX ADMIN — FUROSEMIDE 80 MG: 10 INJECTION, SOLUTION INTRAVENOUS at 04:04

## 2024-04-24 RX ADMIN — FOLIC ACID-PYRIDOXINE-CYANOCOBALAMIN TAB 2.5-25-2 MG 1 TABLET: 2.5-25-2 TAB at 06:04

## 2024-04-24 RX ADMIN — WARFARIN SODIUM 4.5 MG: 2.5 TABLET ORAL at 05:04

## 2024-04-24 RX ADMIN — INSULIN ASPART 10 UNITS: 100 INJECTION, SOLUTION INTRAVENOUS; SUBCUTANEOUS at 05:04

## 2024-04-24 RX ADMIN — INSULIN ASPART 5 UNITS: 100 INJECTION, SOLUTION INTRAVENOUS; SUBCUTANEOUS at 10:04

## 2024-04-24 RX ADMIN — AMLODIPINE BESYLATE 5 MG: 5 TABLET ORAL at 10:04

## 2024-04-24 RX ADMIN — INSULIN ASPART 18 UNITS: 100 INJECTION, SOLUTION INTRAVENOUS; SUBCUTANEOUS at 09:04

## 2024-04-24 RX ADMIN — MILRINONE LACTATE IN DEXTROSE 0.25 MCG/KG/MIN: 200 INJECTION, SOLUTION INTRAVENOUS at 01:04

## 2024-04-24 RX ADMIN — LEVETIRACETAM 1500 MG: 500 TABLET, FILM COATED ORAL at 10:04

## 2024-04-24 RX ADMIN — SPIRONOLACTONE 25 MG: 25 TABLET ORAL at 10:04

## 2024-04-24 RX ADMIN — ATORVASTATIN CALCIUM 40 MG: 20 TABLET, FILM COATED ORAL at 10:04

## 2024-04-24 RX ADMIN — CYCLOBENZAPRINE HYDROCHLORIDE 5 MG: 5 TABLET, FILM COATED ORAL at 10:04

## 2024-04-24 RX ADMIN — GABAPENTIN 300 MG: 300 CAPSULE ORAL at 10:04

## 2024-04-24 RX ADMIN — MAGNESIUM SULFATE HEPTAHYDRATE 2 G: 40 INJECTION, SOLUTION INTRAVENOUS at 09:04

## 2024-04-24 RX ADMIN — ASPIRIN 81 MG: 81 TABLET, COATED ORAL at 10:04

## 2024-04-24 RX ADMIN — Medication 10 ML: at 05:04

## 2024-04-24 RX ADMIN — Medication 10 ML: at 12:04

## 2024-04-24 RX ADMIN — PANTOPRAZOLE SODIUM 40 MG: 40 TABLET, DELAYED RELEASE ORAL at 10:04

## 2024-04-24 RX ADMIN — CYCLOBENZAPRINE HYDROCHLORIDE 5 MG: 5 TABLET, FILM COATED ORAL at 04:04

## 2024-04-24 RX ADMIN — Medication 10 ML: at 06:04

## 2024-04-24 RX ADMIN — MUPIROCIN: 20 OINTMENT TOPICAL at 10:04

## 2024-04-24 RX ADMIN — Medication 400 MG: at 10:04

## 2024-04-24 RX ADMIN — POTASSIUM CHLORIDE 40 MEQ: 1500 TABLET, EXTENDED RELEASE ORAL at 10:04

## 2024-04-24 RX ADMIN — HYDROCODONE BITARTRATE AND ACETAMINOPHEN 1 TABLET: 5; 325 TABLET ORAL at 09:04

## 2024-04-24 RX ADMIN — BACLOFEN 5 MG: 5 TABLET ORAL at 10:04

## 2024-04-24 NOTE — PROGRESS NOTES
04/24/2024  John Alaniz    Current provider:  Natalya Villatoro MD    Device interrogation:      4/24/2024     8:07 AM 4/23/2024    11:08 PM 4/23/2024     9:05 PM 4/23/2024     4:29 PM 4/23/2024    12:00 PM 4/23/2024     8:41 AM 4/23/2024     8:40 AM   TXP LVAD INTERROGATIONS   Type HeartMate3 HeartMate3  HeartMate3 HeartMate3 HeartMate3 HeartMate3   Flow 4.5 4.1 4.5 4 4.4 4.4    Speed 5100 5100 5100 5100 5100 5100    PI 3.6 4.4 3.5 5.9 4.7 3.7    Power (Serna) 3.7 3.7 3.6 3.5 3.7 3.6    LSL 4700   4700 4700 4700           Rounded on Kevan Queen to ensure all mechanical assist device settings (IABP or VAD) were appropriate and all parameters were within limits.  I was able to ensure all back up equipment was present, the staff had no issues, and the Perfusion Department daily rounding was complete.      For implantable VADs: Interrogation of Ventricular assist device was performed with analysis of device parameters and review of device function. I have personally reviewed the interrogation findings and agree with findings as stated.     In emergency, the nursing units have been notified to contact the perfusion department either by:  Calling [phone] from 630am to 4pm Mon thru Fri, utilizing the On-Call Finder functionality of Epic and searching for Perfusion, or by contacting the hospital  from 4pm to 630am and on weekends and asking to speak with the perfusionist on call.    11:11 AM

## 2024-04-24 NOTE — SUBJECTIVE & OBJECTIVE
Interval History: NAEON. No acute complaints. CVP 16. Refused evening lasix dose last night due to cramping. States getting flexeril at same time as lasix has helped.     Continuous Infusions:  Current Facility-Administered Medications   Medication Dose Route Frequency Last Rate Last Admin    milrinone 20mg/100ml D5W (200mcg/ml)  0.25 mcg/kg/min Intravenous Continuous 7.1 mL/hr at 04/23/24 2214 0.25 mcg/kg/min at 04/23/24 2214     Scheduled Meds:  Current Facility-Administered Medications   Medication Dose Route Frequency    amLODIPine  5 mg Oral Daily    aspirin  81 mg Oral Daily    atorvastatin  40 mg Oral Daily    gabapentin  300 mg Oral Daily    insulin aspart U-100  18 Units Subcutaneous TIDWM    insulin detemir U-100  36 Units Subcutaneous QHS    levETIRAcetam  1,500 mg Oral BID    magnesium oxide  400 mg Oral Daily    magnesium sulfate IVPB  2 g Intravenous Once    mirtazapine  15 mg Oral Nightly    mupirocin   Nasal BID    pantoprazole  40 mg Oral Daily    sodium chloride 0.9%  10 mL Intravenous Q6H    spironolactone  25 mg Oral Daily    [START ON 4/26/2024] warfarin  3 mg Oral Once per day on Sunday Friday    warfarin  4.5 mg Oral Once per day on Monday Tuesday Wednesday Thursday Saturday     PRN Meds:  Current Facility-Administered Medications:     acetaminophen, 650 mg, Oral, Q6H PRN    cyclobenzaprine, 5 mg, Oral, TID PRN    dextrose 10%, 12.5 g, Intravenous, PRN    dextrose 10%, 12.5 g, Intravenous, PRN    dextrose 10%, 25 g, Intravenous, PRN    dextrose 10%, 25 g, Intravenous, PRN    glucagon (human recombinant), 1 mg, Intramuscular, PRN    glucose, 16 g, Oral, PRN    glucose, 24 g, Oral, PRN    insulin aspart U-100, 0-10 Units, Subcutaneous, AC + HS + 0200 PRN    ondansetron, 4 mg, Oral, Q8H PRN    Flushing PICC/Midline Protocol, , , Until Discontinued **AND** sodium chloride 0.9%, 10 mL, Intravenous, Q6H **AND** sodium chloride 0.9%, 10 mL, Intravenous, PRN    Review of patient's allergies indicates:    Allergen Reactions    Bumex [bumetanide] Hives    Torsemide Hives     Objective:     Vital Signs (Most Recent):  Temp: 96.4 °F (35.8 °C) (04/24/24 0743)  Pulse: (!) 117 (04/24/24 0743)  Resp: 18 (04/24/24 0743)  BP: (!) 78/0 (04/24/24 0758)  SpO2: 99 % (04/24/24 0002) Vital Signs (24h Range):  Temp:  [96.4 °F (35.8 °C)-98.5 °F (36.9 °C)] 96.4 °F (35.8 °C)  Pulse:  [] 117  Resp:  [18-20] 18  SpO2:  [98 %-100 %] 99 %  BP: ()/(0-82) 78/0     Patient Vitals for the past 72 hrs (Last 3 readings):   Weight   04/24/24 0453 77.2 kg (170 lb 3.1 oz)   04/23/24 0429 76.1 kg (167 lb 12.3 oz)     Body mass index is 21.27 kg/m².      Intake/Output Summary (Last 24 hours) at 4/24/2024 1033  Last data filed at 4/24/2024 0835  Gross per 24 hour   Intake 1560 ml   Output 2125 ml   Net -565 ml       Hemodynamic Parameters:       Telemetry: reviewed     Physical Exam  Vitals and nursing note reviewed.   Constitutional:       Appearance: Normal appearance.   HENT:      Head: Normocephalic and atraumatic.      Nose: Nose normal.   Eyes:      Pupils: Pupils are equal, round, and reactive to light.   Neck:      Vascular: JVD present.      Comments: JVP jaw at 45 degrees  Cardiovascular:      Rate and Rhythm: Normal rate and regular rhythm.      Comments: Smooth VAD hum  Pulmonary:      Effort: Pulmonary effort is normal.      Breath sounds: Normal breath sounds.   Abdominal:      General: Abdomen is flat. There is no distension.      Palpations: Abdomen is soft.   Musculoskeletal:         General: Normal range of motion.   Skin:     General: Skin is warm and dry.      Capillary Refill: Capillary refill takes 2 to 3 seconds.   Neurological:      General: No focal deficit present.      Mental Status: He is alert and oriented to person, place, and time.   Psychiatric:         Mood and Affect: Mood normal.         Behavior: Behavior normal.            Significant Labs:  CBC:  Recent Labs   Lab 04/22/24  0516 04/23/24  0538  04/24/24  0530   WBC 5.87 6.35 10.23   RBC 4.67 4.59* 4.62   HGB 7.8* 7.6* 7.8*   HCT 28.4* 28.5* 28.6*    250 257   MCV 61* 62* 62*   MCH 16.7* 16.6* 16.9*   MCHC 27.5* 26.7* 27.3*     BNP:  Recent Labs   Lab 04/22/24  0516 04/24/24  0530   * 285*     CMP:  Recent Labs   Lab 04/22/24  0516 04/22/24  0749 04/23/24  0538 04/23/24  1417 04/23/24  1622 04/24/24  0530   GLU  --    < > 211*  --  173* 123*   CALCIUM  --    < > 8.9  --  9.5 8.9   ALBUMIN 2.5*  --  2.6*  --   --  2.8*   PROT 6.8  --  6.9  --   --  7.4   NA  --    < > 135*  --  132* 133*   K  --    < > 4.3 3.9 4.0 3.8   CO2  --    < > 26  --  27 27   CL  --    < > 102  --  97 99   BUN  --    < > 13  --  15 15   CREATININE  --    < > 1.2  --  1.3 1.1   ALKPHOS 170*  --  180*  --   --  186*   ALT 17  --  17  --   --  23   AST 43*  --  43*  --   --  42*   BILITOT 2.0*  --  1.6*  --   --  1.7*    < > = values in this interval not displayed.      Coagulation:   Recent Labs   Lab 04/22/24  0516 04/23/24  0538 04/24/24  0530   INR 2.7* 2.5* 2.0*   APTT 34.3* 34.3* 33.3*     LDH:  Recent Labs   Lab 04/22/24 0516 04/23/24  0538 04/24/24  0530   * 279* 267*     Microbiology:  Microbiology Results (last 7 days)       Procedure Component Value Units Date/Time    Blood culture x two cultures. Draw prior to antibiotics. [[id number]] Collected: 04/20/24 0210    Order Status: Completed Specimen: Blood from Peripheral, Hand, Left Updated: 04/24/24 0612     Blood Culture, Routine No Growth to date      No Growth to date      No Growth to date      No Growth to date      No Growth to date    Narrative:      Aerobic and anaerobic    Blood culture x two cultures. Draw prior to antibiotics. [[id number]] Collected: 04/20/24 0209    Order Status: Completed Specimen: Blood from Peripheral, Antecubital, Left Updated: 04/24/24 0612     Blood Culture, Routine No Growth to date      No Growth to date      No Growth to date      No Growth to date      No Growth to  date    Narrative:      Aerobic and anaerobic            I have reviewed all pertinent labs within the past 24 hours.    Estimated Creatinine Clearance: 98.4 mL/min (based on SCr of 1.1 mg/dL).    Diagnostic Results:  I have reviewed all pertinent imaging results/findings within the past 24 hours.

## 2024-04-24 NOTE — PROGRESS NOTES
[facility]  Heart Transplant  Progress Note    Patient Name: Kevan Queen  MRN: [id number]  Admission Date: 4/20/2024  Hospital Length of Stay: 4 days  Attending Physician: Natalya Villatoro MD  Primary Care Provider: Rosio Armendariz FNP  Principal Problem:Acute on chronic combined systolic and diastolic heart failure    Subjective:   Interval History: NAEON. No acute complaints. CVP 16. Refused evening lasix dose last night due to cramping. States getting flexeril at same time as lasix has helped.     Continuous Infusions:  Current Facility-Administered Medications   Medication Dose Route Frequency Last Rate Last Admin    milrinone 20mg/100ml D5W (200mcg/ml)  0.25 mcg/kg/min Intravenous Continuous 7.1 mL/hr at 04/23/24 2214 0.25 mcg/kg/min at 04/23/24 2214     Scheduled Meds:  Current Facility-Administered Medications   Medication Dose Route Frequency    amLODIPine  5 mg Oral Daily    aspirin  81 mg Oral Daily    atorvastatin  40 mg Oral Daily    gabapentin  300 mg Oral Daily    insulin aspart U-100  18 Units Subcutaneous TIDWM    insulin detemir U-100  36 Units Subcutaneous QHS    levETIRAcetam  1,500 mg Oral BID    magnesium oxide  400 mg Oral Daily    magnesium sulfate IVPB  2 g Intravenous Once    mirtazapine  15 mg Oral Nightly    mupirocin   Nasal BID    pantoprazole  40 mg Oral Daily    sodium chloride 0.9%  10 mL Intravenous Q6H    spironolactone  25 mg Oral Daily    [START ON 4/26/2024] warfarin  3 mg Oral Once per day on Sunday Friday    warfarin  4.5 mg Oral Once per day on Monday Tuesday Wednesday Thursday Saturday     PRN Meds:  Current Facility-Administered Medications:     acetaminophen, 650 mg, Oral, Q6H PRN    cyclobenzaprine, 5 mg, Oral, TID PRN    dextrose 10%, 12.5 g, Intravenous, PRN    dextrose 10%, 12.5 g, Intravenous, PRN    dextrose 10%, 25 g, Intravenous, PRN    dextrose 10%, 25 g, Intravenous, PRN    glucagon (human recombinant), 1 mg, Intramuscular, PRN     glucose, 16 g, Oral, PRN    glucose, 24 g, Oral, PRN    insulin aspart U-100, 0-10 Units, Subcutaneous, AC + HS + 0200 PRN    ondansetron, 4 mg, Oral, Q8H PRN    Flushing PICC/Midline Protocol, , , Until Discontinued **AND** sodium chloride 0.9%, 10 mL, Intravenous, Q6H **AND** sodium chloride 0.9%, 10 mL, Intravenous, PRN    Review of patient's allergies indicates:   Allergen Reactions    Bumex [bumetanide] Hives    Torsemide Hives     Objective:     Vital Signs (Most Recent):  Temp: 96.4 °F (35.8 °C) (04/24/24 0743)  Pulse: (!) 117 (04/24/24 0743)  Resp: 18 (04/24/24 0743)  BP: (!) 78/0 (04/24/24 0758)  SpO2: 99 % (04/24/24 0002) Vital Signs (24h Range):  Temp:  [96.4 °F (35.8 °C)-98.5 °F (36.9 °C)] 96.4 °F (35.8 °C)  Pulse:  [] 117  Resp:  [18-20] 18  SpO2:  [98 %-100 %] 99 %  BP: ()/(0-82) 78/0     Patient Vitals for the past 72 hrs (Last 3 readings):   Weight   04/24/24 0453 77.2 kg (170 lb 3.1 oz)   04/23/24 0429 76.1 kg (167 lb 12.3 oz)     Body mass index is 21.27 kg/m².      Intake/Output Summary (Last 24 hours) at 4/24/2024 1033  Last data filed at 4/24/2024 0835  Gross per 24 hour   Intake 1560 ml   Output 2125 ml   Net -565 ml       Hemodynamic Parameters:       Telemetry: reviewed     Physical Exam  Vitals and nursing note reviewed.   Constitutional:       Appearance: Normal appearance.   HENT:      Head: Normocephalic and atraumatic.      Nose: Nose normal.   Eyes:      Pupils: Pupils are equal, round, and reactive to light.   Neck:      Vascular: JVD present.      Comments: JVP jaw at 45 degrees  Cardiovascular:      Rate and Rhythm: Normal rate and regular rhythm.      Comments: Smooth VAD hum  Pulmonary:      Effort: Pulmonary effort is normal.      Breath sounds: Normal breath sounds.   Abdominal:      General: Abdomen is flat. There is no distension.      Palpations: Abdomen is soft.   Musculoskeletal:         General: Normal range of motion.   Skin:     General: Skin is warm and dry.       Capillary Refill: Capillary refill takes 2 to 3 seconds.   Neurological:      General: No focal deficit present.      Mental Status: He is alert and oriented to person, place, and time.   Psychiatric:         Mood and Affect: Mood normal.         Behavior: Behavior normal.            Significant Labs:  CBC:  Recent Labs   Lab 04/22/24 0516 04/23/24  0538 04/24/24  0530   WBC 5.87 6.35 10.23   RBC 4.67 4.59* 4.62   HGB 7.8* 7.6* 7.8*   HCT 28.4* 28.5* 28.6*    250 257   MCV 61* 62* 62*   MCH 16.7* 16.6* 16.9*   MCHC 27.5* 26.7* 27.3*     BNP:  Recent Labs   Lab 04/22/24 0516 04/24/24  0530   * 285*     CMP:  Recent Labs   Lab 04/22/24  0516 04/22/24  0749 04/23/24  0538 04/23/24  1417 04/23/24  1622 04/24/24  0530   GLU  --    < > 211*  --  173* 123*   CALCIUM  --    < > 8.9  --  9.5 8.9   ALBUMIN 2.5*  --  2.6*  --   --  2.8*   PROT 6.8  --  6.9  --   --  7.4   NA  --    < > 135*  --  132* 133*   K  --    < > 4.3 3.9 4.0 3.8   CO2  --    < > 26  --  27 27   CL  --    < > 102  --  97 99   BUN  --    < > 13  --  15 15   CREATININE  --    < > 1.2  --  1.3 1.1   ALKPHOS 170*  --  180*  --   --  186*   ALT 17  --  17  --   --  23   AST 43*  --  43*  --   --  42*   BILITOT 2.0*  --  1.6*  --   --  1.7*    < > = values in this interval not displayed.      Coagulation:   Recent Labs   Lab 04/22/24 0516 04/23/24 0538 04/24/24  0530   INR 2.7* 2.5* 2.0*   APTT 34.3* 34.3* 33.3*     LDH:  Recent Labs   Lab 04/22/24  0516 04/23/24  0538 04/24/24  0530   * 279* 267*     Microbiology:  Microbiology Results (last 7 days)       Procedure Component Value Units Date/Time    Blood culture x two cultures. Draw prior to antibiotics. [[id number]] Collected: 04/20/24 0210    Order Status: Completed Specimen: Blood from Peripheral, Hand, Left Updated: 04/24/24 0612     Blood Culture, Routine No Growth to date      No Growth to date      No Growth to date      No Growth to date      No Growth to date     Narrative:      Aerobic and anaerobic    Blood culture x two cultures. Draw prior to antibiotics. [[id number]] Collected: 04/20/24 0209    Order Status: Completed Specimen: Blood from Peripheral, Antecubital, Left Updated: 04/24/24 0612     Blood Culture, Routine No Growth to date      No Growth to date      No Growth to date      No Growth to date      No Growth to date    Narrative:      Aerobic and anaerobic            I have reviewed all pertinent labs within the past 24 hours.    Estimated Creatinine Clearance: 98.4 mL/min (based on SCr of 1.1 mg/dL).    Diagnostic Results:  I have reviewed all pertinent imaging results/findings within the past 24 hours.  Assessment and Plan:     Patient is a 39 year old male with stage D 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 He underwent RVAD removal and chest closure 6/30/2022.  He also completed a course of IV Abx for staph epi. He was weaned off  but he had to restarted due to RVF. He was eventually transitioned to milrinone (secondary to  shortage) now on 0.25 mcg/kg/min. He has a history of unclear syncope vs seizures and is followed by neuro for this and is on Keppra.       On 11/15/23 underwent removal of eroded [employer] scICD--no Lifevest (due to LVAD) and no plan to replace ICD as not requiring therapy post LVAD with concern for reinfection.  He has not had neurology f/u with seizure hx on keppra so coordinator to assist him with obtaining appt.  Patient presents for concerns for fever, vomiting and pain and concerns of driveline infection, low INR and hyperglycemia. Beta hydroxybutyrate negative. Lactic acid negative. Normal WBC count and CT Abd/Pelv with contrast without signs of driveline infection. Had elevated blood  glucose 03/19/2024 and was advised to go to ED. Also advised to go to ED in the first week of April for fever of 102*F (reported) but did not go at that time. Patient reports that he has been taking lower doses of Lasix at home as compared to what has been prescribed. Exam consistent with hypervolemia. Bedside TTE with IVC 2.5 cm and non collapsible IVC. No inflow outflow cannula issues visualized.     * Acute on chronic combined systolic and diastolic heart failure  -NICM s/p HM 3 on home Milrinone   -Last 2D Echo  9/5/23 : LVEF 10-15%, LVEDD  7.11 cm with speed at 5100  -Last RHC: 10/31/22 with speed at 5100 on Milrinone 0.125 RA: 18, RV: 35/7 (22), PAP: 35/19 (24), PWP: 21, CO: 3.55, CI: 1.6  -Hypervolemic on examination today  -Start IVP lasix 80mg bid.   -GDMT with N/A  -2g Na dietary restriction, 1500 mL fluid restriction, strict I/Os      Type 2 diabetes mellitus with hyperosmolar hyperglycemic state (HHS)  -management per endocrine      Seizure-like activity  -restarted home Keppra dose 1500 BID  -no seizure activity this hospital stay    LVAD (left ventricular assist device) present  -HeartMate 3 Implanted on 6/29/2022 as DT with RV failure on milrinone at 0.25 mcg/kg/min.  -Continue Coumadin; INR goal 2.0-3.0,  INR is therapeutic today   -Antiplatelets: ASA 81  -LDH is stable.  Will continue to monitor daily  -Speed set at 5100, LSL 4700 rpm  -Echo: 9/5/23 EF: 10-15%, LVEDD: 7.11cm.        Procedure: Device Interrogation Including analysis of device parameters  Current Settings: Ventricular Assist Device  Review of device function is stable        4/23/2024     8:41 AM 4/23/2024     8:40 AM 4/23/2024     5:25 AM 4/23/2024    12:00 AM 4/22/2024     7:00 PM 4/22/2024     4:51 PM 4/22/2024    12:00 PM   TXP LVAD INTERROGATIONS   Type  HeartMate3 HeartMate3 HeartMate3 HeartMate3 HeartMate3 HeartMate3   Flow 4.4  3.8 4.2 4 3.9 4.4   Speed 5100  5100 5100 5100 5150 5100   PI 3.7  5.9 4.5 5.8 5.9 4.7   Power  (Serna) 3.6  3.7 3.6 3.6 3.5 3.6   LSL 4700     4750 4700         HTN (hypertension)  -Goal MAP 60-90  -Was consistently above 90 yesterday and Norvasc started     Chronic anticoagulation  -INR 1.9 on admit  -Continue warfarin    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    CHF (NYHA class IV, ACC/AHA stage D)  -NICM  -see above     Anemia of chronic disease  -H/H stable        Chantal Herndon MD  Heart Transplant  [facility]

## 2024-04-24 NOTE — ASSESSMENT & PLAN NOTE
BG goal 140-180     - Levemir 36 units nightly (10% decrease due to fasting blood glucose slightly below goal this AM)  - Novolog (aspart) insulin 18 Units SQ TIDWM and prn for BG excursions [facility] SSI (150/25).  - BG checks /HS/0200  - Hypoglycemia protocol in place    ** Please notify Endocrine for any change and/or advance in diet**  ** Please call Endocrine for any BG related issues **    Discharge Planning:   TBD. Please notify endocrinology prior to discharge.

## 2024-04-24 NOTE — PROGRESS NOTES
Saw Robyn walking in [place], she stopped me and asked for the electric letter.  Upon talking further, they moved a year ago and she hasn't completed the new community contact forms.  I brought her a copy to his room, but she wasn't in there.  I explained to patient what was needed including his signature. Son and daughter both present in room.  Daughter got up from blanket to give me a hug, then son.   Pt verbalized he will give these forms  to Robyn when she returns in a few minutes.

## 2024-04-24 NOTE — SUBJECTIVE & OBJECTIVE
"Interval HPI:   Overnight events:No acute events overnight. Patient in room 315/315 A. Blood glucose variable. BG at/above goal on current insulin regimen (SSI, prandial, and basal insulin ). Steroid use- None.   Renal function- Normal   Vasopressors-  None      Diet diabetic  Calorie      Eatin%  Nausea: No  Hypoglycemia and intervention: No  Fever: No  TPN and/or TF: No    BP (!) 78/0 (BP Location: Right arm, Patient Position: Lying)   Pulse (!) 117   Temp 96.4 °F (35.8 °C) (Oral)   Resp 18   Ht 6' 3" (1.905 m)   Wt 77.2 kg (170 lb 3.1 oz)   SpO2 99%   BMI 21.27 kg/m²     Labs Reviewed and Include    Recent Labs   Lab 24  0530   *   CALCIUM 8.9   ALBUMIN 2.8*   PROT 7.4   *   K 3.8   CO2 27   CL 99   BUN 15   CREATININE 1.1   ALKPHOS 186*   ALT 23   AST 42*   BILITOT 1.7*     Lab Results   Component Value Date    WBC 10.23 2024    HGB 7.8 (L) 2024    HCT 28.6 (L) 2024    MCV 62 (L) 2024     2024     No results for input(s): "TSH", "FREET4" in the last 168 hours.  Lab Results   Component Value Date    HGBA1C >14.0 (H) 2024       Nutritional status:   Body mass index is 21.27 kg/m².  Lab Results   Component Value Date    ALBUMIN 2.8 (L) 2024    ALBUMIN 2.6 (L) 2024    ALBUMIN 2.5 (L) 2024     Lab Results   Component Value Date    PREALBUMIN 13 (L) 2024    PREALBUMIN 9 (L) 2024    PREALBUMIN 14 (L) 2024       Estimated Creatinine Clearance: 98.4 mL/min (based on SCr of 1.1 mg/dL).    Accu-Checks  Recent Labs     24  0034 24  0534 24  0830 24  1216 24  1220 24  1345 24  1636 24  2031 24  0155 24  0525   POCTGLUCOSE 464* 231* 227* 447* 419* 291* 167* 315* 264* 144*       Current Medications and/or Treatments Impacting Glycemic Control  Immunotherapy:    Immunosuppressants       None          Steroids:   Hormones (From admission, onward)      " None          Pressors:    Autonomic Drugs (From admission, onward)      None          Hyperglycemia/Diabetes Medications:   Antihyperglycemics (From admission, onward)      Start     Stop Route Frequency Ordered    04/24/24 2100  insulin detemir U-100 (Levemir) pen 36 Units         -- SubQ Nightly 04/24/24 0845    04/23/24 1245  insulin aspart U-100 pen 18 Units         -- SubQ 3 times daily with meals 04/23/24 1233    04/23/24 1103  insulin aspart U-100 pen 0-10 Units         -- SubQ Before meals, nightly and at 0200 PRN 04/23/24 1003    04/20/24 2100  insulin regular in 0.9 % NaCl 100 unit/100 mL (1 unit/mL) infusion        Question:  Enter initial dose (Units/hr):  Answer:  1.7    04/22/24 2100 IV Continuous 04/20/24 1947

## 2024-04-24 NOTE — PROGRESS NOTES
[facility]  Endocrinology  Progress Note    Admit Date: 4/20/2024     Reason for Consult: Management of T2DM, Hyperglycemia      Surgical Procedure and Date: LVAD 06/29/2022     Diabetes diagnosis year: 2022     Home Diabetes Medications:   -Levemir 30 units QD   -Novolog 20 units TIDWM in addition to the following correction scale:    150 - 200 + 2 unit    201 - 250 + 4 units    251 - 300 + 6 units    301 - 350 + 8 units       > 350   + 10 units     How often checking glucose at home?  > 4 times per day  BG readings on regimen: 180's-200's  Hypoglycemia on the regimen?  No  Missed doses on regimen?  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 He underwent RVAD removal and chest closure 6/30/2022.  He also completed a course of IV Abx for staph epi. He was weaned off  but he had to restarted due to RVF. He was eventually transitioned to milrinone (secondary to  shortage) now on 0.25 mcg/kg/min. He has a history of unclear syncope vs seizures and is followed by neuro for this and is on Keppra.   On 11/15/23 underwent removal of eroded [employer] scICD--no Lifevest (due to LVAD) and no plan to replace ICD as not requiring therapy post LVAD with concern for reinfection.  He has not had neurology f/u with seizure hx on keppra so coordinator to assist him with obtaining appt.  Patient presents for concerns for fever, vomiting and pain and concerns of driveline infection, low INR and hyperglycemia. Beta hydroxybutyrate  "negative. Lactic acid negative. Normal WBC count and CT Abd/Pelv with contrast without signs of driveline infection. Had elevated blood glucose 2024 and was advised to go to ED. Also advised to go to ED in the first week of April for fever of 102*F (reported) but did not go at that time. Patient reports that he has been taking lower doses of Lasix at home as compared to what has been prescribed. Exam consistent with hypervolemia. Bedside TTE with IVC 2.5 cm and non collapsible IVC. No inflow outflow cannula issues visualized. Endocrine consulted to manage hyperglycemia and type 2 diabetes.     Lab Results   Component Value Date    HGBA1C >14.0 (H) 2024         Interval HPI:   Overnight events:No acute events overnight. Patient in room 315/315 A. Blood glucose variable. BG at/above goal on current insulin regimen (SSI, prandial, and basal insulin ). Steroid use- None.   Renal function- Normal   Vasopressors-  None      Diet diabetic  Calorie      Eatin%  Nausea: No  Hypoglycemia and intervention: No  Fever: No  TPN and/or TF: No    BP (!) 78/0 (BP Location: Right arm, Patient Position: Lying)   Pulse (!) 117   Temp 96.4 °F (35.8 °C) (Oral)   Resp 18   Ht 6' 3" (1.905 m)   Wt 77.2 kg (170 lb 3.1 oz)   SpO2 99%   BMI 21.27 kg/m²     Labs Reviewed and Include    Recent Labs   Lab 24  0530   *   CALCIUM 8.9   ALBUMIN 2.8*   PROT 7.4   *   K 3.8   CO2 27   CL 99   BUN 15   CREATININE 1.1   ALKPHOS 186*   ALT 23   AST 42*   BILITOT 1.7*     Lab Results   Component Value Date    WBC 10.23 2024    HGB 7.8 (L) 2024    HCT 28.6 (L) 2024    MCV 62 (L) 2024     2024     No results for input(s): "TSH", "FREET4" in the last 168 hours.  Lab Results   Component Value Date    HGBA1C >14.0 (H) 2024       Nutritional status:   Body mass index is 21.27 kg/m².  Lab Results   Component Value Date    ALBUMIN 2.8 (L) 2024    ALBUMIN 2.6 (L) " 04/23/2024    ALBUMIN 2.5 (L) 04/22/2024     Lab Results   Component Value Date    PREALBUMIN 13 (L) 04/24/2024    PREALBUMIN 9 (L) 04/22/2024    PREALBUMIN 14 (L) 01/03/2024       Estimated Creatinine Clearance: 98.4 mL/min (based on SCr of 1.1 mg/dL).    Accu-Checks  Recent Labs     04/23/24  0034 04/23/24  0534 04/23/24  0830 04/23/24  1216 04/23/24  1220 04/23/24  1345 04/23/24  1636 04/23/24  2031 04/24/24  0155 04/24/24  0525   POCTGLUCOSE 464* 231* 227* 447* 419* 291* 167* 315* 264* 144*       Current Medications and/or Treatments Impacting Glycemic Control  Immunotherapy:    Immunosuppressants       None          Steroids:   Hormones (From admission, onward)      None          Pressors:    Autonomic Drugs (From admission, onward)      None          Hyperglycemia/Diabetes Medications:   Antihyperglycemics (From admission, onward)      Start     Stop Route Frequency Ordered    04/24/24 2100  insulin detemir U-100 (Levemir) pen 36 Units         -- SubQ Nightly 04/24/24 0845    04/23/24 1245  insulin aspart U-100 pen 18 Units         -- SubQ 3 times daily with meals 04/23/24 1233    04/23/24 1103  insulin aspart U-100 pen 0-10 Units         -- SubQ Before meals, nightly and at 0200 PRN 04/23/24 1003    04/20/24 2100  insulin regular in 0.9 % NaCl 100 unit/100 mL (1 unit/mL) infusion        Question:  Enter initial dose (Units/hr):  Answer:  1.7    04/22/24 2100 IV Continuous 04/20/24 1947            ASSESSMENT and PLAN    Cardiac/Vascular  * Acute on chronic combined systolic and diastolic heart failure  Managed per primary team  Avoid hypoglycemia        LVAD (left ventricular assist device) present  Managed per primary team  Avoid hypoglycemia        Endocrine  Type 2 diabetes mellitus with hyperglycemia, with long-term current use of insulin  BG goal 140-180     - Levemir 36 units nightly (10% decrease due to fasting blood glucose slightly below goal this AM)  - Novolog (aspart) insulin 18 Units SQ TIDWM and  prn for BG excursions [facility] SSI (150/25).  - BG checks AC/HS/0200  - Hypoglycemia protocol in place    ** Please notify Endocrine for any change and/or advance in diet**  ** Please call Endocrine for any BG related issues **    Discharge Planning:   TBD. Please notify endocrinology prior to discharge.              Susanna Doty PA-C  Endocrinology  [facility]

## 2024-04-24 NOTE — PROCEDURES
Interrogation of Ventricular assist device was performed with physician analysis of device parameters and review of device function. I have personally reviewed the interrogation findings and agree with findings as stated.   Procedure: Device Interrogation Including analysis of device parameters  Current Settings: Ventricular Assist Device  Review of device function is stable      4/24/2024     1:02 PM 4/24/2024     8:07 AM 4/23/2024    11:08 PM 4/23/2024     9:05 PM 4/23/2024     4:29 PM 4/23/2024    12:00 PM 4/23/2024     8:41 AM   TXP LVAD INTERROGATIONS   Type HeartMate3 HeartMate3 HeartMate3  HeartMate3 HeartMate3 HeartMate3   Flow 4.5 4.5 4.1 4.5 4 4.4 4.4   Speed 5100 5100 5100 5100 5100 5100 5100   PI 3.6 3.6 4.4 3.5 5.9 4.7 3.7   Power (Serna) 3.7 3.7 3.7 3.6 3.5 3.7 3.6   LSL 4700 4700   4700 4700 4700      Natalya Villatoro MD

## 2024-04-25 LAB
ALBUMIN SERPL BCP-MCNC: 2.9 G/DL (ref 3.5–5.2)
ALLENS TEST: ABNORMAL
ALP SERPL-CCNC: 206 U/L (ref 55–135)
ALT SERPL W/O P-5'-P-CCNC: 23 U/L (ref 10–44)
ANION GAP SERPL CALC-SCNC: 11 MMOL/L (ref 8–16)
APTT PPP: 36 SEC (ref 21–32)
AST SERPL-CCNC: 35 U/L (ref 10–40)
BACTERIA BLD CULT: NORMAL
BACTERIA BLD CULT: NORMAL
BASOPHILS # BLD AUTO: 0.06 K/UL (ref 0–0.2)
BASOPHILS NFR BLD: 0.5 % (ref 0–1.9)
BILIRUB SERPL-MCNC: 1.9 MG/DL (ref 0.1–1)
BUN SERPL-MCNC: 18 MG/DL (ref 6–20)
CALCIUM SERPL-MCNC: 8.7 MG/DL (ref 8.7–10.5)
CHLORIDE SERPL-SCNC: 88 MMOL/L (ref 95–110)
CO2 SERPL-SCNC: 28 MMOL/L (ref 23–29)
CREAT SERPL-MCNC: 1.4 MG/DL (ref 0.5–1.4)
DIFFERENTIAL METHOD BLD: ABNORMAL
EOSINOPHIL # BLD AUTO: 0.1 K/UL (ref 0–0.5)
EOSINOPHIL NFR BLD: 0.6 % (ref 0–8)
ERYTHROCYTE [DISTWIDTH] IN BLOOD BY AUTOMATED COUNT: 24.6 % (ref 11.5–14.5)
EST. GFR  (NO RACE VARIABLE): >60 ML/MIN/1.73 M^2
GLUCOSE SERPL-MCNC: 510 MG/DL (ref 70–110)
HCO3 UR-SCNC: 34.6 MMOL/L (ref 24–28)
HCT VFR BLD AUTO: 29.8 % (ref 40–54)
HGB BLD-MCNC: 8.3 G/DL (ref 14–18)
IMM GRANULOCYTES # BLD AUTO: 0.03 K/UL (ref 0–0.04)
IMM GRANULOCYTES NFR BLD AUTO: 0.3 % (ref 0–0.5)
INR PPP: 2.2 (ref 0.8–1.2)
LDH SERPL L TO P-CCNC: 344 U/L (ref 110–260)
LYMPHOCYTES # BLD AUTO: 1.7 K/UL (ref 1–4.8)
LYMPHOCYTES NFR BLD: 15 % (ref 18–48)
MAGNESIUM SERPL-MCNC: 1.8 MG/DL (ref 1.6–2.6)
MAGNESIUM SERPL-MCNC: 2.1 MG/DL (ref 1.6–2.6)
MCH RBC QN AUTO: 16.9 PG (ref 27–31)
MCHC RBC AUTO-ENTMCNC: 27.9 G/DL (ref 32–36)
MCV RBC AUTO: 61 FL (ref 82–98)
MONOCYTES # BLD AUTO: 1 K/UL (ref 0.3–1)
MONOCYTES NFR BLD: 8.8 % (ref 4–15)
NEUTROPHILS # BLD AUTO: 8.5 K/UL (ref 1.8–7.7)
NEUTROPHILS NFR BLD: 74.8 % (ref 38–73)
NRBC BLD-RTO: 0 /100 WBC
PCO2 BLDA: 56.2 MMHG (ref 35–45)
PH SMN: 7.4 [PH] (ref 7.35–7.45)
PLATELET # BLD AUTO: 268 K/UL (ref 150–450)
PMV BLD AUTO: ABNORMAL FL (ref 9.2–12.9)
PO2 BLDA: 25 MMHG (ref 40–60)
POC BE: 10 MMOL/L
POC SATURATED O2: 44 % (ref 95–100)
POC TCO2: 36 MMOL/L (ref 24–29)
POCT GLUCOSE: 105 MG/DL (ref 70–110)
POCT GLUCOSE: 167 MG/DL (ref 70–110)
POCT GLUCOSE: 174 MG/DL (ref 70–110)
POCT GLUCOSE: 182 MG/DL (ref 70–110)
POCT GLUCOSE: 187 MG/DL (ref 70–110)
POCT GLUCOSE: 200 MG/DL (ref 70–110)
POCT GLUCOSE: 206 MG/DL (ref 70–110)
POCT GLUCOSE: 258 MG/DL (ref 70–110)
POCT GLUCOSE: 259 MG/DL (ref 70–110)
POCT GLUCOSE: 278 MG/DL (ref 70–110)
POCT GLUCOSE: 292 MG/DL (ref 70–110)
POCT GLUCOSE: 297 MG/DL (ref 70–110)
POCT GLUCOSE: 302 MG/DL (ref 70–110)
POCT GLUCOSE: 310 MG/DL (ref 70–110)
POCT GLUCOSE: 369 MG/DL (ref 70–110)
POCT GLUCOSE: 371 MG/DL (ref 70–110)
POCT GLUCOSE: 375 MG/DL (ref 70–110)
POCT GLUCOSE: 415 MG/DL (ref 70–110)
POCT GLUCOSE: 417 MG/DL (ref 70–110)
POCT GLUCOSE: 420 MG/DL (ref 70–110)
POCT GLUCOSE: 473 MG/DL (ref 70–110)
POCT GLUCOSE: 83 MG/DL (ref 70–110)
POCT GLUCOSE: 89 MG/DL (ref 70–110)
POCT GLUCOSE: 89 MG/DL (ref 70–110)
POCT GLUCOSE: >500 MG/DL (ref 70–110)
POCT GLUCOSE: >500 MG/DL (ref 70–110)
POTASSIUM SERPL-SCNC: 3.5 MMOL/L (ref 3.5–5.1)
POTASSIUM SERPL-SCNC: 3.7 MMOL/L (ref 3.5–5.1)
PROT SERPL-MCNC: 8 G/DL (ref 6–8.4)
PROTHROMBIN TIME: 22.6 SEC (ref 9–12.5)
RBC # BLD AUTO: 4.91 M/UL (ref 4.6–6.2)
SAMPLE: ABNORMAL
SITE: ABNORMAL
SODIUM SERPL-SCNC: 127 MMOL/L (ref 136–145)
WBC # BLD AUTO: 11.34 K/UL (ref 3.9–12.7)

## 2024-04-25 PROCEDURE — 25000003 PHARM REV CODE 250: Performed by: PHYSICIAN ASSISTANT

## 2024-04-25 PROCEDURE — 85025 COMPLETE CBC W/AUTO DIFF WBC: CPT | Performed by: INTERNAL MEDICINE

## 2024-04-25 PROCEDURE — 99900035 HC TECH TIME PER 15 MIN (STAT)

## 2024-04-25 PROCEDURE — 25000003 PHARM REV CODE 250: Performed by: INTERNAL MEDICINE

## 2024-04-25 PROCEDURE — 83735 ASSAY OF MAGNESIUM: CPT | Mod: 91 | Performed by: STUDENT IN AN ORGANIZED HEALTH CARE EDUCATION/TRAINING PROGRAM

## 2024-04-25 PROCEDURE — 25000003 PHARM REV CODE 250: Performed by: STUDENT IN AN ORGANIZED HEALTH CARE EDUCATION/TRAINING PROGRAM

## 2024-04-25 PROCEDURE — 99233 SBSQ HOSP IP/OBS HIGH 50: CPT | Mod: ,,, | Performed by: INTERNAL MEDICINE

## 2024-04-25 PROCEDURE — 20600001 HC STEP DOWN PRIVATE ROOM

## 2024-04-25 PROCEDURE — 25000003 PHARM REV CODE 250

## 2024-04-25 PROCEDURE — 83615 LACTATE (LD) (LDH) ENZYME: CPT | Performed by: INTERNAL MEDICINE

## 2024-04-25 PROCEDURE — 80053 COMPREHEN METABOLIC PANEL: CPT | Performed by: STUDENT IN AN ORGANIZED HEALTH CARE EDUCATION/TRAINING PROGRAM

## 2024-04-25 PROCEDURE — 63600175 PHARM REV CODE 636 W HCPCS: Performed by: INTERNAL MEDICINE

## 2024-04-25 PROCEDURE — 93750 INTERROGATION VAD IN PERSON: CPT | Mod: ,,, | Performed by: INTERNAL MEDICINE

## 2024-04-25 PROCEDURE — 63600175 PHARM REV CODE 636 W HCPCS: Performed by: STUDENT IN AN ORGANIZED HEALTH CARE EDUCATION/TRAINING PROGRAM

## 2024-04-25 PROCEDURE — 99233 SBSQ HOSP IP/OBS HIGH 50: CPT | Mod: ,,,

## 2024-04-25 PROCEDURE — 85730 THROMBOPLASTIN TIME PARTIAL: CPT | Performed by: INTERNAL MEDICINE

## 2024-04-25 PROCEDURE — 85610 PROTHROMBIN TIME: CPT | Performed by: INTERNAL MEDICINE

## 2024-04-25 PROCEDURE — 27000248 HC VAD-ADDITIONAL DAY

## 2024-04-25 PROCEDURE — 63600175 PHARM REV CODE 636 W HCPCS

## 2024-04-25 PROCEDURE — 83735 ASSAY OF MAGNESIUM: CPT | Performed by: INTERNAL MEDICINE

## 2024-04-25 PROCEDURE — A4216 STERILE WATER/SALINE, 10 ML: HCPCS | Performed by: INTERNAL MEDICINE

## 2024-04-25 PROCEDURE — 84132 ASSAY OF SERUM POTASSIUM: CPT | Performed by: STUDENT IN AN ORGANIZED HEALTH CARE EDUCATION/TRAINING PROGRAM

## 2024-04-25 RX ORDER — INSULIN ASPART 100 [IU]/ML
18 INJECTION, SOLUTION INTRAVENOUS; SUBCUTANEOUS
Status: DISCONTINUED | OUTPATIENT
Start: 2024-04-25 | End: 2024-04-26

## 2024-04-25 RX ORDER — INSULIN ASPART 100 [IU]/ML
0-10 INJECTION, SOLUTION INTRAVENOUS; SUBCUTANEOUS EVERY 4 HOURS PRN
Status: DISCONTINUED | OUTPATIENT
Start: 2024-04-25 | End: 2024-04-26

## 2024-04-25 RX ORDER — POTASSIUM CHLORIDE 750 MG/1
50 CAPSULE, EXTENDED RELEASE ORAL ONCE
Status: COMPLETED | OUTPATIENT
Start: 2024-04-25 | End: 2024-04-25

## 2024-04-25 RX ORDER — BACLOFEN 5 MG/1
5 TABLET ORAL ONCE
Status: COMPLETED | OUTPATIENT
Start: 2024-04-25 | End: 2024-04-25

## 2024-04-25 RX ORDER — INSULIN ASPART 100 [IU]/ML
5 INJECTION, SOLUTION INTRAVENOUS; SUBCUTANEOUS ONCE
Status: COMPLETED | OUTPATIENT
Start: 2024-04-25 | End: 2024-04-25

## 2024-04-25 RX ORDER — POTASSIUM CHLORIDE 20 MEQ/1
40 TABLET, EXTENDED RELEASE ORAL ONCE
Status: COMPLETED | OUTPATIENT
Start: 2024-04-25 | End: 2024-04-25

## 2024-04-25 RX ORDER — MAGNESIUM SULFATE HEPTAHYDRATE 40 MG/ML
2 INJECTION, SOLUTION INTRAVENOUS ONCE
Status: COMPLETED | OUTPATIENT
Start: 2024-04-25 | End: 2024-04-25

## 2024-04-25 RX ORDER — IBUPROFEN 200 MG
16 TABLET ORAL
Status: DISCONTINUED | OUTPATIENT
Start: 2024-04-25 | End: 2024-04-26

## 2024-04-25 RX ORDER — IBUPROFEN 200 MG
24 TABLET ORAL
Status: DISCONTINUED | OUTPATIENT
Start: 2024-04-25 | End: 2024-04-26

## 2024-04-25 RX ORDER — FUROSEMIDE 80 MG/1
80 TABLET ORAL DAILY
Status: DISCONTINUED | OUTPATIENT
Start: 2024-04-25 | End: 2024-04-27 | Stop reason: HOSPADM

## 2024-04-25 RX ORDER — INSULIN ASPART 100 [IU]/ML
18 INJECTION, SOLUTION INTRAVENOUS; SUBCUTANEOUS
Status: DISCONTINUED | OUTPATIENT
Start: 2024-04-26 | End: 2024-04-25

## 2024-04-25 RX ORDER — GLUCAGON 1 MG
1 KIT INJECTION
Status: DISCONTINUED | OUTPATIENT
Start: 2024-04-25 | End: 2024-04-26

## 2024-04-25 RX ADMIN — Medication 10 ML: at 05:04

## 2024-04-25 RX ADMIN — Medication 400 MG: at 08:04

## 2024-04-25 RX ADMIN — POTASSIUM CHLORIDE 40 MEQ: 1500 TABLET, EXTENDED RELEASE ORAL at 11:04

## 2024-04-25 RX ADMIN — BACLOFEN 5 MG: 5 TABLET ORAL at 03:04

## 2024-04-25 RX ADMIN — MILRINONE LACTATE IN DEXTROSE 0.25 MCG/KG/MIN: 200 INJECTION, SOLUTION INTRAVENOUS at 03:04

## 2024-04-25 RX ADMIN — ATORVASTATIN CALCIUM 40 MG: 20 TABLET, FILM COATED ORAL at 08:04

## 2024-04-25 RX ADMIN — CYCLOBENZAPRINE HYDROCHLORIDE 5 MG: 5 TABLET, FILM COATED ORAL at 02:04

## 2024-04-25 RX ADMIN — INSULIN ASPART 18 UNITS: 100 INJECTION, SOLUTION INTRAVENOUS; SUBCUTANEOUS at 09:04

## 2024-04-25 RX ADMIN — LEVETIRACETAM 1500 MG: 500 TABLET, FILM COATED ORAL at 08:04

## 2024-04-25 RX ADMIN — INSULIN ASPART 5 UNITS: 100 INJECTION, SOLUTION INTRAVENOUS; SUBCUTANEOUS at 02:04

## 2024-04-25 RX ADMIN — Medication 10 ML: at 12:04

## 2024-04-25 RX ADMIN — MAGNESIUM SULFATE HEPTAHYDRATE 2 G: 40 INJECTION, SOLUTION INTRAVENOUS at 07:04

## 2024-04-25 RX ADMIN — GABAPENTIN 300 MG: 300 CAPSULE ORAL at 08:04

## 2024-04-25 RX ADMIN — INSULIN HUMAN 3 UNITS/HR: 1 INJECTION, SOLUTION INTRAVENOUS at 05:04

## 2024-04-25 RX ADMIN — SPIRONOLACTONE 25 MG: 25 TABLET ORAL at 08:04

## 2024-04-25 RX ADMIN — INSULIN HUMAN 3.25 UNITS/HR: 1 INJECTION, SOLUTION INTRAVENOUS at 05:04

## 2024-04-25 RX ADMIN — WARFARIN SODIUM 4.5 MG: 2.5 TABLET ORAL at 05:04

## 2024-04-25 RX ADMIN — Medication 10 ML: at 07:04

## 2024-04-25 RX ADMIN — POTASSIUM CHLORIDE 50 MEQ: 750 CAPSULE, EXTENDED RELEASE ORAL at 07:04

## 2024-04-25 RX ADMIN — MILRINONE LACTATE IN DEXTROSE 0.25 MCG/KG/MIN: 200 INJECTION, SOLUTION INTRAVENOUS at 05:04

## 2024-04-25 RX ADMIN — CYCLOBENZAPRINE HYDROCHLORIDE 5 MG: 5 TABLET, FILM COATED ORAL at 11:04

## 2024-04-25 RX ADMIN — PANTOPRAZOLE SODIUM 40 MG: 40 TABLET, DELAYED RELEASE ORAL at 08:04

## 2024-04-25 RX ADMIN — POTASSIUM CHLORIDE 40 MEQ: 1500 TABLET, EXTENDED RELEASE ORAL at 06:04

## 2024-04-25 RX ADMIN — ASPIRIN 81 MG: 81 TABLET, COATED ORAL at 08:04

## 2024-04-25 RX ADMIN — FUROSEMIDE 80 MG: 80 TABLET ORAL at 11:04

## 2024-04-25 RX ADMIN — MIRTAZAPINE 15 MG: 15 TABLET, FILM COATED ORAL at 08:04

## 2024-04-25 RX ADMIN — FOLIC ACID-PYRIDOXINE-CYANOCOBALAMIN TAB 2.5-25-2 MG 1 TABLET: 2.5-25-2 TAB at 09:04

## 2024-04-25 RX ADMIN — ACETAMINOPHEN 650 MG: 325 TABLET ORAL at 03:04

## 2024-04-25 RX ADMIN — AMLODIPINE BESYLATE 5 MG: 5 TABLET ORAL at 08:04

## 2024-04-25 NOTE — ASSESSMENT & PLAN NOTE
-NICM s/p HM 3 on home Milrinone   -Last 2D Echo  9/5/23 : LVEF 10-15%, LVEDD  7.11 cm with speed at 5100  -Last RHC: 10/31/22 with speed at 5100 on Milrinone 0.125 RA: 18, RV: 35/7 (22), PAP: 35/19 (24), PWP: 21, CO: 3.55, CI: 1.6  -Patient near euvolemic. Will start PO lasix 80mg daily.   -GDMT with N/A  -2g Na dietary restriction, 1500 mL fluid restriction, strict I/Os

## 2024-04-25 NOTE — SUBJECTIVE & OBJECTIVE
Interval History: Overnight glucose >500 and patient restarted on insulin drip. Denies drinking/eating any sugar or sports drinks. No acute complaints. CVP 10.     Continuous Infusions:  Current Facility-Administered Medications   Medication Dose Route Frequency Last Rate Last Admin    insulin regular 1 units/mL infusion orderable (DKA)  0-52 Units/hr Intravenous Continuous 3 mL/hr at 04/25/24 1032 3 Units/hr at 04/25/24 1032    milrinone 20mg/100ml D5W (200mcg/ml)  0.25 mcg/kg/min Intravenous Continuous 7.1 mL/hr at 04/25/24 0355 0.25 mcg/kg/min at 04/25/24 0355     Scheduled Meds:  Current Facility-Administered Medications   Medication Dose Route Frequency    amLODIPine  5 mg Oral Daily    aspirin  81 mg Oral Daily    atorvastatin  40 mg Oral Daily    folic acid-vit B6-vit B12 2.5-25-2 mg  1 tablet Oral Daily    gabapentin  300 mg Oral Daily    levETIRAcetam  1,500 mg Oral BID    magnesium oxide  400 mg Oral Daily    mirtazapine  15 mg Oral Nightly    mupirocin   Nasal BID    pantoprazole  40 mg Oral Daily    potassium chloride  40 mEq Oral Once    sodium chloride 0.9%  10 mL Intravenous Q6H    spironolactone  25 mg Oral Daily    [START ON 4/26/2024] warfarin  3 mg Oral Once per day on Sunday Friday    warfarin  4.5 mg Oral Once per day on Monday Tuesday Wednesday Thursday Saturday     PRN Meds:  Current Facility-Administered Medications:     acetaminophen, 650 mg, Oral, Q6H PRN    cyclobenzaprine, 5 mg, Oral, TID PRN    dextrose 10%, 12.5 g, Intravenous, PRN    dextrose 10%, 12.5 g, Intravenous, PRN    dextrose 10%, 12.5 g, Intravenous, PRN    dextrose 10%, 25 g, Intravenous, PRN    dextrose 10%, 25 g, Intravenous, PRN    dextrose 10%, 25 g, Intravenous, PRN    ondansetron, 4 mg, Oral, Q8H PRN    Flushing PICC/Midline Protocol, , , Until Discontinued **AND** sodium chloride 0.9%, 10 mL, Intravenous, Q6H **AND** sodium chloride 0.9%, 10 mL, Intravenous, PRN    Review of patient's allergies indicates:   Allergen  Reactions    Bumex [bumetanide] Hives    Torsemide Hives     Objective:     Vital Signs (Most Recent):  Temp: 96.1 °F (35.6 °C) (04/25/24 0811)  Pulse: (!) 119 (04/25/24 0934)  Resp: 18 (04/25/24 0811)  BP: (!) 74/0 (04/25/24 0811)  SpO2: 98 % (04/25/24 0811) Vital Signs (24h Range):  Temp:  [96.1 °F (35.6 °C)-99.2 °F (37.3 °C)] 96.1 °F (35.6 °C)  Pulse:  [114-128] 119  Resp:  [17-18] 18  SpO2:  [98 %-99 %] 98 %  BP: ()/(0-76) 74/0     Patient Vitals for the past 72 hrs (Last 3 readings):   Weight   04/24/24 0453 77.2 kg (170 lb 3.1 oz)   04/23/24 0429 76.1 kg (167 lb 12.3 oz)     Body mass index is 21.27 kg/m².      Intake/Output Summary (Last 24 hours) at 4/25/2024 1045  Last data filed at 4/25/2024 0751  Gross per 24 hour   Intake 720 ml   Output 8775 ml   Net -8055 ml       Hemodynamic Parameters:       Telemetry: reviewed     Physical Exam  Vitals and nursing note reviewed.   Constitutional:       Appearance: Normal appearance.   HENT:      Head: Normocephalic and atraumatic.      Nose: Nose normal.   Eyes:      Pupils: Pupils are equal, round, and reactive to light.   Neck:      Vascular: JVD present.      Comments: JVP mid neck at 45 degrees  Cardiovascular:      Rate and Rhythm: Normal rate and regular rhythm.      Comments: Smooth VAD hum  Pulmonary:      Effort: Pulmonary effort is normal.      Breath sounds: Normal breath sounds.   Abdominal:      General: Abdomen is flat. There is no distension.      Palpations: Abdomen is soft.   Musculoskeletal:         General: Normal range of motion.   Skin:     General: Skin is warm and dry.      Capillary Refill: Capillary refill takes 2 to 3 seconds.   Neurological:      General: No focal deficit present.      Mental Status: He is alert and oriented to person, place, and time.   Psychiatric:         Mood and Affect: Mood normal.         Behavior: Behavior normal.            Significant Labs:  CBC:  Recent Labs   Lab 04/23/24  0538 04/24/24  0525  04/25/24 0432   WBC 6.35 10.23 11.34   RBC 4.59* 4.62 4.91   HGB 7.6* 7.8* 8.3*   HCT 28.5* 28.6* 29.8*    257 268   MCV 62* 62* 61*   MCH 16.6* 16.9* 16.9*   MCHC 26.7* 27.3* 27.9*     BNP:  Recent Labs   Lab 04/22/24  0516 04/24/24 0530   * 285*     CMP:  Recent Labs   Lab 04/23/24 0538 04/23/24  1417 04/23/24  1622 04/24/24  0530 04/24/24  1656 04/24/24  1938 04/25/24 0432   *  --  173* 123*  --   --  510*   CALCIUM 8.9  --  9.5 8.9  --   --  8.7   ALBUMIN 2.6*  --   --  2.8*  --   --  2.9*   PROT 6.9  --   --  7.4  --   --  8.0   *  --  132* 133*  --   --  127*   K 4.3   < > 4.0 3.8 4.0 3.9 3.5   CO2 26  --  27 27  --   --  28     --  97 99  --   --  88*   BUN 13  --  15 15  --   --  18   CREATININE 1.2  --  1.3 1.1  --   --  1.4   ALKPHOS 180*  --   --  186*  --   --  206*   ALT 17  --   --  23  --   --  23   AST 43*  --   --  42*  --   --  35   BILITOT 1.6*  --   --  1.7*  --   --  1.9*    < > = values in this interval not displayed.      Coagulation:   Recent Labs   Lab 04/23/24 0538 04/24/24 0530 04/25/24 0432   INR 2.5* 2.0* 2.2*   APTT 34.3* 33.3* 36.0*     LDH:  Recent Labs   Lab 04/23/24 0538 04/24/24 0530 04/25/24 0432   * 267* 344*     Microbiology:  Microbiology Results (last 7 days)       Procedure Component Value Units Date/Time    Blood culture x two cultures. Draw prior to antibiotics. [[id number]] Collected: 04/20/24 0210    Order Status: Completed Specimen: Blood from Peripheral, Hand, Left Updated: 04/25/24 0612     Blood Culture, Routine No growth after 5 days.    Narrative:      Aerobic and anaerobic    Blood culture x two cultures. Draw prior to antibiotics. [[id number]] Collected: 04/20/24 0209    Order Status: Completed Specimen: Blood from Peripheral, Antecubital, Left Updated: 04/25/24 0612     Blood Culture, Routine No growth after 5 days.    Narrative:      Aerobic and anaerobic            I have reviewed all pertinent labs within the  past 24 hours.    Estimated Creatinine Clearance: 77.4 mL/min (based on SCr of 1.4 mg/dL).    Diagnostic Results:  I have reviewed all pertinent imaging results/findings within the past 24 hours.

## 2024-04-25 NOTE — SUBJECTIVE & OBJECTIVE
"Interval HPI:   Patient in room 315/315 A. BG excursions noted overnight, not improved with SSI. IIP initiated.  Blood glucose now improving. BG above goal on current insulin regimen (IIP). Steroid use- None.      Renal function- Normal   Vasopressors-  None     Diet Clear liquid (no sugar)/Bariatric     Eatin%  Nausea: No  Hypoglycemia and intervention: No  Fever: No  TPN and/or TF: No    BP (!) 78/0 (BP Location: Right arm, Patient Position: Lying)   Pulse (!) 119   Temp 96.1 °F (35.6 °C) (Oral)   Resp 18   Ht 6' 3" (1.905 m)   Wt 77.2 kg (170 lb 3.1 oz)   SpO2 98%   BMI 21.27 kg/m²     Labs Reviewed and Include    Recent Labs   Lab 24  0432   *   CALCIUM 8.7   ALBUMIN 2.9*   PROT 8.0   *   K 3.5   CO2 28   CL 88*   BUN 18   CREATININE 1.4   ALKPHOS 206*   ALT 23   AST 35   BILITOT 1.9*     Lab Results   Component Value Date    WBC 11.34 2024    HGB 8.3 (L) 2024    HCT 29.8 (L) 2024    MCV 61 (L) 2024     2024     No results for input(s): "TSH", "FREET4" in the last 168 hours.  Lab Results   Component Value Date    HGBA1C >14.0 (H) 2024       Nutritional status:   Body mass index is 21.27 kg/m².  Lab Results   Component Value Date    ALBUMIN 2.9 (L) 2024    ALBUMIN 2.8 (L) 2024    ALBUMIN 2.6 (L) 2024     Lab Results   Component Value Date    PREALBUMIN 13 (L) 2024    PREALBUMIN 9 (L) 2024    PREALBUMIN 14 (L) 2024       Estimated Creatinine Clearance: 77.4 mL/min (based on SCr of 1.4 mg/dL).    Accu-Checks  Recent Labs     24  0756 24  1125 24  1603 24  2203 24  0208 24  0359 24  0413 24  0616 24  0721 24  0816   POCTGLUCOSE 183* 286* 359* 363* 473* >500* >500* 292* 259* 206*       Current Medications and/or Treatments Impacting Glycemic Control  Immunotherapy:    Immunosuppressants       None          Steroids:   Hormones (From admission, " onward)      None          Pressors:    Autonomic Drugs (From admission, onward)      None          Hyperglycemia/Diabetes Medications:   Antihyperglycemics (From admission, onward)      Start     Stop Route Frequency Ordered    04/25/24 0515  insulin regular in 0.9 % NaCl 100 unit/100 mL (1 unit/mL) infusion        Question:  Enter initial dose from Infusion Protocol Chart (Units/hr):  Answer:  3    -- IV Continuous 04/25/24 0408 04/20/24 2100  insulin regular in 0.9 % NaCl 100 unit/100 mL (1 unit/mL) infusion        Question:  Enter initial dose (Units/hr):  Answer:  1.7    04/22/24 2100 IV Continuous 04/20/24 1947
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
## 2024-04-25 NOTE — NURSING
Latest Reference Range & Units 04/25/24 04:13 04/25/24 06:16 04/25/24 07:21 04/25/24 08:16 04/25/24 09:15 04/25/24 10:14 04/25/24 10:15   POCT Glucose 70 - 110 mg/dL >500 (HH) 292 (H) 259 (H) 206 (H) 278 (H) 420 (H) 415 (H)   (HH): Data is critically high  (H): Data is abnormally high      Patients blood sugar has been taken Q1H since starting the insulin drip. Algorithm of the drip has been followed according to patients BS result. BS had dropped from the 500s to the 200s, but last BS jumped back into the 400s. Patient stated he did eat a bowl of cereal that his child had left on the table, it was reiterated to him that he is now only on SUGAR FREE CLEAR LIQUIDS while he is on the drip, he stated no one had updated him about this, education on the reasoning was given with a verbal understanding from the patient. Endocrine was reached out to who went ahead and ordered a rate change back up to 3 units for the insulin drip. Drip was updated, will cont to shelly patients sugar Q1H.

## 2024-04-25 NOTE — NURSING
Latest Reference Range & Units 04/25/24 18:38   POCT Glucose 70 - 110 mg/dL 89     Patient BG 89, per Insulin instructions, drip is now paused, will recheck in 15 minutes.

## 2024-04-25 NOTE — PLAN OF CARE
Problem: Adult Inpatient Plan of Care  Goal: Plan of Care Review  4/25/2024 1738 by Lilian Starkey RN  Outcome: Progressing  4/25/2024 1737 by Lilian Starkey RN  Reactivated  4/25/2024 1736 by Lilian Starkey RN  Outcome: Met  Goal: Patient-Specific Goal (Individualized)  4/25/2024 1738 by Lilian Starkey RN  Outcome: Progressing  4/25/2024 1737 by Lilian Starkey RN  Reactivated  4/25/2024 1736 by Lilian Starkey RN  Outcome: Met  Goal: Absence of Hospital-Acquired Illness or Injury  4/25/2024 1738 by Lilian Starkey RN  Outcome: Progressing  4/25/2024 1737 by Lilian Starkey RN  Reactivated  4/25/2024 1736 by Lilian Starkey RN  Outcome: Met  Goal: Optimal Comfort and Wellbeing  4/25/2024 1738 by Lilian Starkey RN  Outcome: Progressing  4/25/2024 1737 by Lilian Starkey RN  Reactivated  4/25/2024 1736 by Lilian Starkey RN  Outcome: Met  Goal: Readiness for Transition of Care  4/25/2024 1738 by Lilian Starkey RN  Outcome: Progressing  4/25/2024 1737 by Lilian Starkey RN  Reactivated  4/25/2024 1736 by Lilian Starkey RN  Outcome: Met     Problem: Infection  Goal: Absence of Infection Signs and Symptoms  4/25/2024 1738 by Lilian Starkey RN  Outcome: Progressing  4/25/2024 1737 by Lilian Starkey RN  Reactivated  4/25/2024 1736 by Lilian Starkey RN  Outcome: Met     Problem: Diabetes Comorbidity  Goal: Blood Glucose Level Within Targeted Range  4/25/2024 1738 by Lilian Starkey RN  Outcome: Progressing  4/25/2024 1737 by Lilian Starkey RN  Reactivated  4/25/2024 1736 by Lilian Starkey RN  Outcome: Met     Problem: Fall Injury Risk  Goal: Absence of Fall and Fall-Related Injury  4/25/2024 1738 by Lilian Starkey RN  Outcome: Progressing  4/25/2024 1737 by Lilian Starkey RN  Reactivated  4/25/2024 1736 by Lilian Stakrey RN  Outcome: Met     Problem: Adjustment to Illness (Heart Failure)  Goal: Optimal Coping  4/25/2024 1738 by Lilian Starkey, RN  Outcome: Progressing  4/25/2024 1737 by Lilian Starkey, RIAN  Reactivated  4/25/2024 1736 by Lilian Starkey,  RN  Outcome: Met     Problem: Cardiac Output Decreased (Heart Failure)  Goal: Optimal Cardiac Output  4/25/2024 1738 by Lilian Starkey RN  Outcome: Progressing  4/25/2024 1737 by Lilian Starkey RN  Reactivated  4/25/2024 1736 by Lilian Starkey RN  Outcome: Met     Problem: Dysrhythmia (Heart Failure)  Goal: Stable Heart Rate and Rhythm  4/25/2024 1738 by Lilian Starkey RN  Outcome: Progressing  4/25/2024 1737 by Lilian Starkey RN  Reactivated  4/25/2024 1736 by Lilian Starkey RN  Outcome: Met     Problem: Fluid Imbalance (Heart Failure)  Goal: Fluid Balance  4/25/2024 1738 by Lilian Starkey RN  Outcome: Progressing  4/25/2024 1737 by Lilian Starkey RN  Reactivated  4/25/2024 1736 by Lilian Starkey RN  Outcome: Met     Problem: Functional Ability Impaired (Heart Failure)  Goal: Optimal Functional Ability  4/25/2024 1738 by Lilian Starkey RN  Outcome: Progressing  4/25/2024 1737 by Lilian Starkey RN  Reactivated  4/25/2024 1736 by Lilian Starkey RN  Outcome: Met     Problem: Oral Intake Inadequate (Heart Failure)  Goal: Optimal Nutrition Intake  4/25/2024 1738 by Lilian Starkey RN  Outcome: Progressing  4/25/2024 1737 by Lilian Starkey RN  Reactivated  4/25/2024 1736 by Lilian Starkey RN  Outcome: Met     Problem: Respiratory Compromise (Heart Failure)  Goal: Effective Oxygenation and Ventilation  4/25/2024 1738 by Lilian Starkey RN  Outcome: Progressing  4/25/2024 1737 by Lilian Starkey RN  Reactivated  4/25/2024 1736 by Lilian Starkey RN  Outcome: Met     Problem: Sleep Disordered Breathing (Heart Failure)  Goal: Effective Breathing Pattern During Sleep  4/25/2024 1738 by Lilian Starkey RN  Outcome: Progressing  4/25/2024 1737 by Lilian Starkey RN  Reactivated  4/25/2024 1736 by Lilian Starkey RN  Outcome: Met     Problem: Adjustment to Device (Ventricular Assist Device)  Goal: Optimal Adjustment to Device  4/25/2024 1738 by Lilian Starkey RN  Outcome: Progressing  4/25/2024 1737 by Lilian Starkey RN  Reactivated  4/25/2024 1736 by Lalito,  RIAN Quintana  Outcome: Met     Problem: Bleeding (Ventricular Assist Device)  Goal: Absence of Bleeding  4/25/2024 1738 by Lilian Starkey RN  Outcome: Progressing  4/25/2024 1737 by Lilian Starkey RN  Reactivated  4/25/2024 1736 by Lilian Starkey RN  Outcome: Met     Problem: Embolism (Ventricular Assist Device)  Goal: Absence of Embolism Signs and Symptoms  4/25/2024 1738 by Lilian Starkey RN  Outcome: Progressing  4/25/2024 1737 by Lilian Starkey RN  Reactivated  4/25/2024 1736 by Lilian Starkey RN  Outcome: Met     Problem: Hemodynamic Instability (Ventricular Assist Device)  Goal: Optimal Blood Flow  4/25/2024 1738 by Lilian Starkey RN  Outcome: Progressing  4/25/2024 1737 by Lilian Starkey RN  Reactivated  4/25/2024 1736 by Lilian Starkey RN  Outcome: Met     Problem: Infection (Ventricular Assist Device)  Goal: Absence of Infection Signs and Symptoms  4/25/2024 1738 by Lilian Starkey RN  Outcome: Progressing  4/25/2024 1737 by Lilian Starkey RN  Reactivated  4/25/2024 1736 by Lilian Starkey RN  Outcome: Met     Problem: Right-Sided Heart Compromise (Ventricular Assist Device)  Goal: Effective Right-Sided Heart Function  4/25/2024 1738 by Lilian Starkey RN  Outcome: Progressing  4/25/2024 1737 by Lilian Starkey RN  Reactivated  4/25/2024 1736 by Lilian Starkey RN  Outcome: Met     Problem: Skin Injury Risk Increased  Goal: Skin Health and Integrity  4/25/2024 1738 by Lilian Starkey RN  Outcome: Progressing  4/25/2024 1737 by Lilian Starkey RN  Reactivated  4/25/2024 1736 by Lilian Starkey RN  Outcome: Met     Problem: Wound  Goal: Optimal Coping  4/25/2024 1738 by Lilian Starkey RN  Outcome: Progressing  4/25/2024 1737 by Lilian Starkey RN  Reactivated  4/25/2024 1736 by Lilian Starkey RN  Outcome: Met  Goal: Optimal Functional Ability  4/25/2024 1738 by Lilian Starkey RN  Outcome: Progressing  4/25/2024 1737 by Lilian Starkey, RN  Reactivated  4/25/2024 1736 by Lilian Starkey, RN  Outcome: Met  Goal: Absence of Infection Signs and  Symptoms  4/25/2024 1738 by Lilian Starkey RN  Outcome: Progressing  4/25/2024 1737 by Lilian Starkey RN  Reactivated  4/25/2024 1736 by Lilian Starkey RN  Outcome: Met  Goal: Improved Oral Intake  4/25/2024 1738 by Lilian Starkey RN  Outcome: Progressing  4/25/2024 1737 by Lilian Starkey RN  Reactivated  4/25/2024 1736 by Lilian Starkey RN  Outcome: Met  Goal: Optimal Pain Control and Function  4/25/2024 1738 by Lilian Starkey RN  Outcome: Progressing  4/25/2024 1737 by Lilian Starkey RN  Reactivated  4/25/2024 1736 by Lilian Starkey RN  Outcome: Met  Goal: Skin Health and Integrity  4/25/2024 1738 by Lilian Starkey RN  Outcome: Progressing  4/25/2024 1737 by Lilian Starkey RN  Reactivated  4/25/2024 1736 by Lilian Starkey RN  Outcome: Met  Goal: Optimal Wound Healing  4/25/2024 1738 by Lilian Starkey RN  Outcome: Progressing  4/25/2024 1737 by Lilian Starkey RN  Reactivated  4/25/2024 1736 by Lilian Starkey RN  Outcome: Met     Problem: Coping Ineffective  Goal: Effective Coping  4/25/2024 1738 by Lilian Starkey RN  Outcome: Progressing  4/25/2024 1737 by Lilian Starkey RN  Reactivated  4/25/2024 1736 by Lilian Starkey RN  Outcome: Met
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
## 2024-04-25 NOTE — PROGRESS NOTES
04/24/24 0705        VAD 06/29/22 1115 Left ventricular assist device HeartMate 3   Placement Date/Time: 06/29/22 1115   Present Prior to Hospital Arrival?: No  Inserted by: MD  VAD Type: Left ventricular assist device  VAD Brand: HeartMate 3   Dressing Change Schedule Daily  (patient family member stated she would do dressing change after supper. VAD dressing kit given to patient along with signage to go on the door)
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
## 2024-04-25 NOTE — PROGRESS NOTES
04/25/2024  Enrique NANDO Awan Jr    Current provider:  Natalya Villatoro MD    Device interrogation:      4/25/2024     5:20 AM 4/25/2024    12:20 AM 4/24/2024     7:45 PM 4/24/2024     6:21 PM 4/24/2024     1:02 PM 4/24/2024     8:07 AM 4/23/2024    11:08 PM   TXP LVAD INTERROGATIONS   Type HeartMate3 HeartMate3 HeartMate3 HeartMate3 HeartMate3 HeartMate3 HeartMate3   Flow 4.4 4.5 4.4 4.6 4.5 4.5 4.1   Speed 5150 5100 5100 5100 5100 5100 5100   PI 4.2 3.9 4.1 3.5 3.6 3.6 4.4   Power (Serna) 3.7 3.8 3..7 3.7 3.7 3.7 3.7   LSL    4700 4700 4700           Rounded on Kevan Queen to ensure all mechanical assist device settings (IABP or VAD) were appropriate and all parameters were within limits.  I was able to ensure all back up equipment was present, the staff had no issues, and the Perfusion Department daily rounding was complete.      For implantable VADs: Interrogation of Ventricular assist device was performed with analysis of device parameters and review of device function. I have personally reviewed the interrogation findings and agree with findings as stated.     In emergency, the nursing units have been notified to contact the perfusion department either by:  Calling [phone] from 630am to 4pm Mon thru Fri, utilizing the On-Call Finder functionality of Epic and searching for Perfusion, or by contacting the hospital  from 4pm to 630am and on weekends and asking to speak with the perfusionist on call.    7:46 AM

## 2024-04-25 NOTE — PROCEDURES
Interrogation of Ventricular assist device was performed with physician analysis of device parameters and review of device function. I have personally reviewed the interrogation findings and agree with findings as stated.   Procedure: Device Interrogation Including analysis of device parameters  Current Settings: Ventricular Assist Device  Review of device function is stable      4/25/2024     7:50 AM 4/25/2024     5:20 AM 4/25/2024    12:20 AM 4/24/2024     7:45 PM 4/24/2024     6:21 PM 4/24/2024     1:02 PM 4/24/2024     8:07 AM   TXP LVAD INTERROGATIONS   Type HeartMate3 HeartMate3 HeartMate3 HeartMate3 HeartMate3 HeartMate3 HeartMate3   Flow 4.3 4.4 4.5 4.4 4.6 4.5 4.5   Speed 5100 5150 5100 5100 5100 5100 5100   PI 5.1 4.2 3.9 4.1 3.5 3.6 3.6   Power (Serna) 3.7 3.7 3.8 3..7 3.7 3.7 3.7   LSL 4700    4700 4700 4700   Pulsatility Intermittent pulse            Natalya Villatoro MD

## 2024-04-25 NOTE — ASSESSMENT & PLAN NOTE
BG goal: 140-180    - Continue insulin infusion protocol until BG WNL/stable on fixed rate   - Bariatric sugar free clear liquid diet while on IIP   - POCT Glucose every hour   - Hypoglycemia protocol in place      ** Please notify Endocrine for any change and/or advance in diet**  ** Please call Endocrine for any BG related issues **     Discharge Planning:   TBD. Please notify endocrine prior to discharge.

## 2024-04-25 NOTE — PROGRESS NOTES
[facility] - Cardiology Stepdown  Heart Transplant  Progress Note    Patient Name: Kevan Queen  MRN: [id number]  Admission Date: 4/20/2024  Hospital Length of Stay: 5 days  Attending Physician: Natalya Villatoro MD  Primary Care Provider: Rosio Armendariz FNP  Principal Problem:Acute on chronic combined systolic and diastolic heart failure    Subjective:   Interval History: Overnight glucose >500 and patient restarted on insulin drip. Denies drinking/eating any sugar or sports drinks. No acute complaints. CVP 10.     Continuous Infusions:  Current Facility-Administered Medications   Medication Dose Route Frequency Last Rate Last Admin    insulin regular 1 units/mL infusion orderable (DKA)  0-52 Units/hr Intravenous Continuous 3 mL/hr at 04/25/24 1032 3 Units/hr at 04/25/24 1032    milrinone 20mg/100ml D5W (200mcg/ml)  0.25 mcg/kg/min Intravenous Continuous 7.1 mL/hr at 04/25/24 0355 0.25 mcg/kg/min at 04/25/24 0355     Scheduled Meds:  Current Facility-Administered Medications   Medication Dose Route Frequency    amLODIPine  5 mg Oral Daily    aspirin  81 mg Oral Daily    atorvastatin  40 mg Oral Daily    folic acid-vit B6-vit B12 2.5-25-2 mg  1 tablet Oral Daily    gabapentin  300 mg Oral Daily    levETIRAcetam  1,500 mg Oral BID    magnesium oxide  400 mg Oral Daily    mirtazapine  15 mg Oral Nightly    mupirocin   Nasal BID    pantoprazole  40 mg Oral Daily    potassium chloride  40 mEq Oral Once    sodium chloride 0.9%  10 mL Intravenous Q6H    spironolactone  25 mg Oral Daily    [START ON 4/26/2024] warfarin  3 mg Oral Once per day on Sunday Friday    warfarin  4.5 mg Oral Once per day on Monday Tuesday Wednesday Thursday Saturday     PRN Meds:  Current Facility-Administered Medications:     acetaminophen, 650 mg, Oral, Q6H PRN    cyclobenzaprine, 5 mg, Oral, TID PRN    dextrose 10%, 12.5 g, Intravenous, PRN    dextrose 10%, 12.5 g, Intravenous, PRN    dextrose 10%, 12.5 g, Intravenous, PRN    dextrose  10%, 25 g, Intravenous, PRN    dextrose 10%, 25 g, Intravenous, PRN    dextrose 10%, 25 g, Intravenous, PRN    ondansetron, 4 mg, Oral, Q8H PRN    Flushing PICC/Midline Protocol, , , Until Discontinued **AND** sodium chloride 0.9%, 10 mL, Intravenous, Q6H **AND** sodium chloride 0.9%, 10 mL, Intravenous, PRN    Review of patient's allergies indicates:   Allergen Reactions    Bumex [bumetanide] Hives    Torsemide Hives     Objective:     Vital Signs (Most Recent):  Temp: 96.1 °F (35.6 °C) (04/25/24 0811)  Pulse: (!) 119 (04/25/24 0934)  Resp: 18 (04/25/24 0811)  BP: (!) 74/0 (04/25/24 0811)  SpO2: 98 % (04/25/24 0811) Vital Signs (24h Range):  Temp:  [96.1 °F (35.6 °C)-99.2 °F (37.3 °C)] 96.1 °F (35.6 °C)  Pulse:  [114-128] 119  Resp:  [17-18] 18  SpO2:  [98 %-99 %] 98 %  BP: ()/(0-76) 74/0     Patient Vitals for the past 72 hrs (Last 3 readings):   Weight   04/24/24 0453 77.2 kg (170 lb 3.1 oz)   04/23/24 0429 76.1 kg (167 lb 12.3 oz)     Body mass index is 21.27 kg/m².      Intake/Output Summary (Last 24 hours) at 4/25/2024 1045  Last data filed at 4/25/2024 0751  Gross per 24 hour   Intake 720 ml   Output 8775 ml   Net -8055 ml       Hemodynamic Parameters:       Telemetry: reviewed     Physical Exam  Vitals and nursing note reviewed.   Constitutional:       Appearance: Normal appearance.   HENT:      Head: Normocephalic and atraumatic.      Nose: Nose normal.   Eyes:      Pupils: Pupils are equal, round, and reactive to light.   Neck:      Vascular: JVD present.      Comments: JVP mid neck at 45 degrees  Cardiovascular:      Rate and Rhythm: Normal rate and regular rhythm.      Comments: Smooth VAD hum  Pulmonary:      Effort: Pulmonary effort is normal.      Breath sounds: Normal breath sounds.   Abdominal:      General: Abdomen is flat. There is no distension.      Palpations: Abdomen is soft.   Musculoskeletal:         General: Normal range of motion.   Skin:     General: Skin is warm and dry.       Capillary Refill: Capillary refill takes 2 to 3 seconds.   Neurological:      General: No focal deficit present.      Mental Status: He is alert and oriented to person, place, and time.   Psychiatric:         Mood and Affect: Mood normal.         Behavior: Behavior normal.            Significant Labs:  CBC:  Recent Labs   Lab 04/23/24 0538 04/24/24 0530 04/25/24 0432   WBC 6.35 10.23 11.34   RBC 4.59* 4.62 4.91   HGB 7.6* 7.8* 8.3*   HCT 28.5* 28.6* 29.8*    257 268   MCV 62* 62* 61*   MCH 16.6* 16.9* 16.9*   MCHC 26.7* 27.3* 27.9*     BNP:  Recent Labs   Lab 04/22/24 0516 04/24/24 0530   * 285*     CMP:  Recent Labs   Lab 04/23/24 0538 04/23/24  1417 04/23/24  1622 04/24/24 0530 04/24/24  1656 04/24/24  1938 04/25/24 0432   *  --  173* 123*  --   --  510*   CALCIUM 8.9  --  9.5 8.9  --   --  8.7   ALBUMIN 2.6*  --   --  2.8*  --   --  2.9*   PROT 6.9  --   --  7.4  --   --  8.0   *  --  132* 133*  --   --  127*   K 4.3   < > 4.0 3.8 4.0 3.9 3.5   CO2 26  --  27 27  --   --  28     --  97 99  --   --  88*   BUN 13  --  15 15  --   --  18   CREATININE 1.2  --  1.3 1.1  --   --  1.4   ALKPHOS 180*  --   --  186*  --   --  206*   ALT 17  --   --  23  --   --  23   AST 43*  --   --  42*  --   --  35   BILITOT 1.6*  --   --  1.7*  --   --  1.9*    < > = values in this interval not displayed.      Coagulation:   Recent Labs   Lab 04/23/24  0538 04/24/24  0530 04/25/24  0432   INR 2.5* 2.0* 2.2*   APTT 34.3* 33.3* 36.0*     LDH:  Recent Labs   Lab 04/23/24  0538 04/24/24  0530 04/25/24  0432   * 267* 344*     Microbiology:  Microbiology Results (last 7 days)       Procedure Component Value Units Date/Time    Blood culture x two cultures. Draw prior to antibiotics. [[id number]] Collected: 04/20/24 0210    Order Status: Completed Specimen: Blood from Peripheral, Hand, Left Updated: 04/25/24 0612     Blood Culture, Routine No growth after 5 days.    Narrative:      Aerobic and  anaerobic    Blood culture x two cultures. Draw prior to antibiotics. [[id number]] Collected: 04/20/24 0209    Order Status: Completed Specimen: Blood from Peripheral, Antecubital, Left Updated: 04/25/24 0612     Blood Culture, Routine No growth after 5 days.    Narrative:      Aerobic and anaerobic            I have reviewed all pertinent labs within the past 24 hours.    Estimated Creatinine Clearance: 77.4 mL/min (based on SCr of 1.4 mg/dL).    Diagnostic Results:  I have reviewed all pertinent imaging results/findings within the past 24 hours.  Assessment and Plan:     Patient is a 39 year old male with stage D 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 He underwent RVAD removal and chest closure 6/30/2022.  He also completed a course of IV Abx for staph epi. He was weaned off  but he had to restarted due to RVF. He was eventually transitioned to milrinone (secondary to  shortage) now on 0.25 mcg/kg/min. He has a history of unclear syncope vs seizures and is followed by neuro for this and is on Keppra.       On 11/15/23 underwent removal of eroded [employer] scICD--no Lifevest (due to LVAD) and no plan to replace ICD as not requiring therapy post LVAD with concern for reinfection.  He has not had neurology f/u with seizure hx on keppra so coordinator to assist him with obtaining appt.  Patient presents for concerns for fever, vomiting and pain and concerns of driveline infection, low INR and hyperglycemia. Beta hydroxybutyrate negative. Lactic acid negative. Normal WBC count and CT Abd/Pelv with contrast without signs of driveline infection. Had elevated blood glucose 03/19/2024 and was advised to go to ED. Also advised to go to ED in the first week of April for fever of  102*F (reported) but did not go at that time. Patient reports that he has been taking lower doses of Lasix at home as compared to what has been prescribed. Exam consistent with hypervolemia. Bedside TTE with IVC 2.5 cm and non collapsible IVC. No inflow outflow cannula issues visualized.     * Acute on chronic combined systolic and diastolic heart failure  -NICM s/p HM 3 on home Milrinone   -Last 2D Echo  9/5/23 : LVEF 10-15%, LVEDD  7.11 cm with speed at 5100  -Last RHC: 10/31/22 with speed at 5100 on Milrinone 0.125 RA: 18, RV: 35/7 (22), PAP: 35/19 (24), PWP: 21, CO: 3.55, CI: 1.6  -Patient near euvolemic. Will start PO lasix 80mg daily.   -GDMT with N/A  -2g Na dietary restriction, 1500 mL fluid restriction, strict I/Os      Type 2 diabetes mellitus with hyperosmolar hyperglycemic state (HHS)  -management per endocrine      Seizure-like activity  -restarted home Keppra dose 1500 BID  -no seizure activity this hospital stay    LVAD (left ventricular assist device) present  -HeartMate 3 Implanted on 6/29/2022 as DT with RV failure on milrinone at 0.25 mcg/kg/min.  -Continue Coumadin; INR goal 2.0-3.0,  INR is therapeutic today   -Antiplatelets: ASA 81  -LDH is stable.  Will continue to monitor daily  -Speed set at 5100, LSL 4700 rpm  -Echo: 9/5/23 EF: 10-15%, LVEDD: 7.11cm.        Procedure: Device Interrogation Including analysis of device parameters  Current Settings: Ventricular Assist Device  Review of device function is stable        4/25/2024     7:50 AM 4/25/2024     5:20 AM 4/25/2024    12:20 AM 4/24/2024     7:45 PM 4/24/2024     6:21 PM 4/24/2024     1:02 PM 4/24/2024     8:07 AM   TXP LVAD INTERROGATIONS   Type HeartMate3 HeartMate3 HeartMate3 HeartMate3 HeartMate3 HeartMate3 HeartMate3   Flow 4.3 4.4 4.5 4.4 4.6 4.5 4.5   Speed 5100 5150 5100 5100 5100 5100 5100   PI 5.1 4.2 3.9 4.1 3.5 3.6 3.6   Power (Serna) 3.7 3.7 3.8 3..7 3.7 3.7 3.7   LSL 4700    4700 4700 4700   Pulsatility Intermittent pulse                HTN (hypertension)  -Goal MAP 60-90  -Was consistently above 90 yesterday and Norvasc started     Chronic anticoagulation  -INR 1.9 on admit  -Continue warfarin    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    CHF (NYHA class IV, ACC/AHA stage D)  -NICM  -see above     Anemia of chronic disease  -H/H stable        Chantal Herndon MD  Heart Transplant  [facility] - Cardiology Step[facility]

## 2024-04-25 NOTE — NURSING
"Notified [place] [facility] at [phone] and spoke with rep Kaye in regards of 2-children (Yousha and Serenity) being left at the bedside with their father who is hospitalized  at bedside.  His girlfriend - Darlyn Wright (Paige) left the room around 0400 this morning and has not returned to the room.  She has been over head page in the hospital several time. Security has been notified, legal team, LVAD coordinators, and primary team -   In addition, we attempted to call her cellular phone at [phone]- with no answer- it  went straight to voice mail and some times it did  not ring, but no answer.     - intake no [phone]-   -will update charge/primary nurse of next steps- it  Robyn does not return back to room (315) we are advise to notify DCFS and provided updated information of the situation. Children will be placed in local agency unitl father is discharge.        "

## 2024-04-25 NOTE — PROGRESS NOTES
Interdisciplinary Rounds Report:   Attended interdisciplinary rounds with the [facility]/CTS services including the LVAD Coordinators, social workers, cardiologists, surgeons,  PT/OT/Speech, dietician, and unit charge nurses. Discussed patient status, plan of care, goals of care, including DC date, and post discharge needs. Plan of care will be discussed with the patient and/or family per the physician while rounding on the floor. This is a recurring meeting that is medically and socially necessary to collaborate with the interdisciplinary team to assist patient needs and safe discharge.

## 2024-04-25 NOTE — NURSING
"Upon morning shift change it was noted from the night nurse that the patients (Kevan) children (Serenity 4 years old & Curtis 6 years old) were sleeping on the couch in the room with the patient himself without supervision of another outside adult. The mother of the children, Robyn, had left sometime last night/early morning and has not been back, many calls have been made to the phone number that is in the chart  for her ([phone]), with no answer or ability to leave a voicemail. According to the patient he also has been trying to call her with no answer. He stated "she's probably in the car sleeping I'll keep calling". My charge nurse, Thao Toribio, is aware of the situation, along with the VAD coordinator Cony, and Dr Herndon who was in the room this morning. The patient was again reminded that the children cannot be left unsupervised in the hospital room as it is unsafe for the children along with the staff and patient if a medical emergency were to happen to him while he was staying here, to which he stated "I know". Apparently this has been happening from the time of his stay and from what I have been told, the mothers reasoning behind this, was that her house was supposedly broken into while they were here visiting and she feels uncomfortable staying there by herself with her children. Other things that have been noted from this patients stay is that the patient and mother leave a loose belt in the room to keep the children from "misbehaving". There has been times during this stay that it has been noted that their have been cries from the children that are associated with scolding from both adult parties in the room. Both children are awake and moving around at this point in the morning, both children wearing unkempt clothing and looking disheveled as if they haven't bathed or changed clothes in awhile, the staff has brought them cereal and milk, along with getting some color sheets and crayons for them to " "stay entertained for now. It's also noted that each child should be in school at this time of the year but are here, when both of the children speak it seems like they are behind on age appropriate vocabulary and normal day to day items that they should know what they are. Patient is in the bed with the loose belt across his lap, all safety measures in place, will cont  to shelly for now.     1035: Dr Villatoro at bedside while I was in the room while I was updating an insulin drip, he asked the patient if there was family who could come get the children, patient stated "my mom gets off around 3pm today she said she would come up here to get them". Daughter Haven in the bed with patient laying down, son Curtis sitting on the couch by the window eating a snack, no complaints from the patient at this time, loose belt still laid in the open across the patient, all safety measures in place, will cont to shelly. He stated "Were really mainly all here because were having issues financially and her leaving and having to come back is a lot on us" He was made aware that he would not have to worry about a ride home and that we could provide him one when the time comes, he sated "I know I just dont want to do that". Patient left in the bed with all safety precautions in place, will cont to [facility].     1450: Patient was getting worried about the mother of the children "Robyn" missing for so long and called [facility] to make a missing person report which as caused  police officers to show up to the room, at the same time Robyn called the patient on his personal cell phone and told him that she was sleeping in her car and just woke up and is on the way to the room. Upon entering the room she was very disheveled and unkempt. She just said she was sorry she fell asleep. I had told her that security had been looking for her car all day throughout the [facility] facility and never found her and that since she is back I will update " "everyone who has been looking for her or involved in this situation today. Cony with LVAD team, Odell, charge nurse, Tiana, , Marielos TRIVEDI, and security were all made aware that Robyn had finally returned. The patient told her that CPS has been called and so were the police trying to look for her and that what she did was not ok. Tiana, Odell, patient relations, and myself all came to the room to have the discussion about what behavior is expected on both parties, visiting policy (including times and age limits), reiterate that CPS has been contacted due to her leaving minors alone for almost 12 hours unattended in a hospital room, that the patients mother also was contacted to come pick the children up after she got off of work at 3 pm (which now needs to be discussed if she actually needs to take that ride out her amongst her and the patient), the safety issues that have been presented in this situation, and to let it be known as a unit decision that the children and herself cannot stay to visit or spend the night here. The patient verbalized she understood and stated she would be leaving soon with the kids to go back home. Children were on the couch, Robyn was in the chair, and Kevan was in bed, with all safety measures in place, will cont to shelly.       1620: while in the room checking houlry BS and doing afternoon dopplers and collectin VAD numbers, Robyn is sitting on the chair while her daughter Haven is sitting on the couch, Haven is antsy getting up and down as any child who has been in one space all day would be, Robyn starts yelling at her stating "sit your little ass down before I whip it, I'm tired of your attitude and not listening, do you hear me? If you don't quit I'm going to pull your pants down and whip those little cakes with my belt and then they will really come looking for me (laughing whilst saying this) and I'll go to group home, I don't care if were  in a hospital " "or not". I finished with my tasks and left the room at this point, both the children were left sitting on the couch, Robyn in the chair, patient in the bed,all safety measures in place, will cont to shelly.   "

## 2024-04-25 NOTE — ASSESSMENT & PLAN NOTE
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  -Continue Coumadin; INR goal 2.0-3.0,  INR is therapeutic today   -Antiplatelets: ASA 81  -LDH is stable.  Will continue to monitor daily  -Speed set at 5100, LSL 4700 rpm  -Echo: 9/5/23 EF: 10-15%, LVEDD: 7.11cm.        Procedure: Device Interrogation Including analysis of device parameters  Current Settings: Ventricular Assist Device  Review of device function is stable        4/25/2024     7:50 AM 4/25/2024     5:20 AM 4/25/2024    12:20 AM 4/24/2024     7:45 PM 4/24/2024     6:21 PM 4/24/2024     1:02 PM 4/24/2024     8:07 AM   TXP LVAD INTERROGATIONS   Type HeartMate3 HeartMate3 HeartMate3 HeartMate3 HeartMate3 HeartMate3 HeartMate3   Flow 4.3 4.4 4.5 4.4 4.6 4.5 4.5   Speed 5100 5150 5100 5100 5100 5100 5100   PI 5.1 4.2 3.9 4.1 3.5 3.6 3.6   Power (Serna) 3.7 3.7 3.8 3..7 3.7 3.7 3.7   LSL 4700    4700 4700 4700   Pulsatility Intermittent pulse

## 2024-04-25 NOTE — PROGRESS NOTES
[facility] - [facility] Stepdown  Endocrinology  Progress Note    Admit Date: 4/20/2024     Reason for Consult: Management of T2DM, Hyperglycemia      Surgical Procedure and Date: LVAD 06/29/2022     Diabetes diagnosis year: 2022     Home Diabetes Medications:   -Levemir 30 units QD   -Novolog 20 units TIDWM in addition to the following correction scale:    150 - 200 + 2 unit    201 - 250 + 4 units    251 - 300 + 6 units    301 - 350 + 8 units       > 350   + 10 units     How often checking glucose at home?  > 4 times per day  BG readings on regimen: 180's-200's  Hypoglycemia on the regimen?  No  Missed doses on regimen?  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 He underwent RVAD removal and chest closure 6/30/2022.  He also completed a course of IV Abx for staph epi. He was weaned off  but he had to restarted due to RVF. He was eventually transitioned to milrinone (secondary to  shortage) now on 0.25 mcg/kg/min. He has a history of unclear syncope vs seizures and is followed by neuro for this and is on Keppra.   On 11/15/23 underwent removal of eroded [employer] scICD--no Lifevest (due to LVAD) and no plan to replace ICD as not requiring therapy post LVAD with concern for reinfection.  He has not had neurology f/u with seizure hx on keppra so coordinator to assist him with obtaining appt.  Patient presents for concerns for fever, vomiting and pain and concerns of driveline infection, low INR and hyperglycemia. Beta hydroxybutyrate  "negative. Lactic acid negative. Normal WBC count and CT Abd/Pelv with contrast without signs of driveline infection. Had elevated blood glucose 2024 and was advised to go to ED. Also advised to go to ED in the first week of April for fever of 102*F (reported) but did not go at that time. Patient reports that he has been taking lower doses of Lasix at home as compared to what has been prescribed. Exam consistent with hypervolemia. Bedside TTE with IVC 2.5 cm and non collapsible IVC. No inflow outflow cannula issues visualized. Endocrine consulted to manage hyperglycemia and type 2 diabetes.     Lab Results   Component Value Date    HGBA1C >14.0 (H) 2024         Interval HPI:   Patient in room 315/315 A. BG excursions noted overnight, not improved with SSI. Patient denies eating/drinking overnight. IIP initiated.  Blood glucose now variable but improving. Of note, patient eating regular diet while on IIP causing excursions- education provided.     Steroid use- None.       Renal function- Normal   Vasopressors-  None     Diet Clear liquid (no sugar)/Bariatric     Eatin%  Nausea: No  Hypoglycemia and intervention: No  Fever: No  TPN and/or TF: No    BP (!) 78/0 (BP Location: Right arm, Patient Position: Lying)   Pulse (!) 119   Temp 96.1 °F (35.6 °C) (Oral)   Resp 18   Ht 6' 3" (1.905 m)   Wt 77.2 kg (170 lb 3.1 oz)   SpO2 98%   BMI 21.27 kg/m²     Labs Reviewed and Include    Recent Labs   Lab 24  0432   *   CALCIUM 8.7   ALBUMIN 2.9*   PROT 8.0   *   K 3.5   CO2 28   CL 88*   BUN 18   CREATININE 1.4   ALKPHOS 206*   ALT 23   AST 35   BILITOT 1.9*     Lab Results   Component Value Date    WBC 11.34 2024    HGB 8.3 (L) 2024    HCT 29.8 (L) 2024    MCV 61 (L) 2024     2024     No results for input(s): "TSH", "FREET4" in the last 168 hours.  Lab Results   Component Value Date    HGBA1C >14.0 (H) 2024       Nutritional status:   Body " mass index is 21.27 kg/m².  Lab Results   Component Value Date    ALBUMIN 2.9 (L) 04/25/2024    ALBUMIN 2.8 (L) 04/24/2024    ALBUMIN 2.6 (L) 04/23/2024     Lab Results   Component Value Date    PREALBUMIN 13 (L) 04/24/2024    PREALBUMIN 9 (L) 04/22/2024    PREALBUMIN 14 (L) 01/03/2024       Estimated Creatinine Clearance: 77.4 mL/min (based on SCr of 1.4 mg/dL).    Accu-Checks  Recent Labs     04/24/24  0756 04/24/24  1125 04/24/24  1603 04/24/24  2203 04/25/24  0208 04/25/24  0359 04/25/24  0413 04/25/24  0616 04/25/24  0721 04/25/24  0816   POCTGLUCOSE 183* 286* 359* 363* 473* >500* >500* 292* 259* 206*       Current Medications and/or Treatments Impacting Glycemic Control  Immunotherapy:    Immunosuppressants       None          Steroids:   Hormones (From admission, onward)      None          Pressors:    Autonomic Drugs (From admission, onward)      None          Hyperglycemia/Diabetes Medications:   Antihyperglycemics (From admission, onward)      Start     Stop Route Frequency Ordered    04/25/24 0515  insulin regular in 0.9 % NaCl 100 unit/100 mL (1 unit/mL) infusion        Question:  Enter initial dose from Infusion Protocol Chart (Units/hr):  Answer:  3    -- IV Continuous 04/25/24 0408    04/20/24 2100  insulin regular in 0.9 % NaCl 100 unit/100 mL (1 unit/mL) infusion        Question:  Enter initial dose (Units/hr):  Answer:  1.7    04/22/24 2100 IV Continuous 04/20/24 1947            ASSESSMENT and PLAN    Cardiac/Vascular  * Acute on chronic combined systolic and diastolic heart failure  Managed per primary team  Avoid hypoglycemia        LVAD (left ventricular assist device) present  Managed per primary team  Avoid hypoglycemia        Endocrine  Type 2 diabetes mellitus with hyperglycemia, with long-term current use of insulin  BG goal: 140-180    - Continue insulin infusion protocol until BG WNL/stable on fixed rate   - Bariatric sugar free clear liquid diet while on IIP   - POCT Glucose every hour    - Hypoglycemia protocol in place      ** Please notify Endocrine for any change and/or advance in diet**  ** Please call Endocrine for any BG related issues **     Discharge Planning:   TBD. Please notify endocrine prior to discharge.               Susanna Doty PA-C  Endocrinology  [facility]
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
## 2024-04-26 ENCOUNTER — PATIENT MESSAGE (OUTPATIENT)
Dept: ENDOCRINOLOGY | Facility: HOSPITAL | Age: 39
End: 2024-04-26
Payer: MEDICAID

## 2024-04-26 PROBLEM — Z71.89 ADVANCE CARE PLANNING: Status: ACTIVE | Noted: 2024-04-26

## 2024-04-26 PROBLEM — Z71.89 GOALS OF CARE, COUNSELING/DISCUSSION: Status: ACTIVE | Noted: 2024-04-26

## 2024-04-26 PROBLEM — R25.2 MUSCLE CRAMPING: Status: ACTIVE | Noted: 2024-04-26

## 2024-04-26 LAB
ALBUMIN SERPL BCP-MCNC: 2.8 G/DL (ref 3.5–5.2)
ALLENS TEST: ABNORMAL
ALP SERPL-CCNC: 200 U/L (ref 55–135)
ALT SERPL W/O P-5'-P-CCNC: 20 U/L (ref 10–44)
ANION GAP SERPL CALC-SCNC: 9 MMOL/L (ref 8–16)
APTT PPP: 34 SEC (ref 21–32)
AST SERPL-CCNC: 33 U/L (ref 10–40)
BASOPHILS # BLD AUTO: 0.06 K/UL (ref 0–0.2)
BASOPHILS NFR BLD: 0.4 % (ref 0–1.9)
BILIRUB SERPL-MCNC: 1.9 MG/DL (ref 0.1–1)
BNP SERPL-MCNC: 265 PG/ML (ref 0–99)
BUN SERPL-MCNC: 19 MG/DL (ref 6–20)
CALCIUM SERPL-MCNC: 9.4 MG/DL (ref 8.7–10.5)
CHLORIDE SERPL-SCNC: 95 MMOL/L (ref 95–110)
CO2 SERPL-SCNC: 27 MMOL/L (ref 23–29)
CREAT SERPL-MCNC: 1.2 MG/DL (ref 0.5–1.4)
DIFFERENTIAL METHOD BLD: ABNORMAL
EOSINOPHIL # BLD AUTO: 0.1 K/UL (ref 0–0.5)
EOSINOPHIL NFR BLD: 0.4 % (ref 0–8)
ERYTHROCYTE [DISTWIDTH] IN BLOOD BY AUTOMATED COUNT: 24.7 % (ref 11.5–14.5)
EST. GFR  (NO RACE VARIABLE): >60 ML/MIN/1.73 M^2
ESTIMATED AVG GLUCOSE: ABNORMAL MG/DL (ref 68–131)
GLUCOSE SERPL-MCNC: 152 MG/DL (ref 70–110)
GLUCOSE SERPL-MCNC: 217 MG/DL (ref 70–110)
HBA1C MFR BLD: >14 % (ref 4–5.6)
HCO3 UR-SCNC: 31.9 MMOL/L (ref 24–28)
HCT VFR BLD AUTO: 29.9 % (ref 40–54)
HGB BLD-MCNC: 8 G/DL (ref 14–18)
IMM GRANULOCYTES # BLD AUTO: 0.06 K/UL (ref 0–0.04)
IMM GRANULOCYTES NFR BLD AUTO: 0.4 % (ref 0–0.5)
INR PPP: 2.3 (ref 0.8–1.2)
LDH SERPL L TO P-CCNC: 270 U/L (ref 110–260)
LYMPHOCYTES # BLD AUTO: 1.5 K/UL (ref 1–4.8)
LYMPHOCYTES NFR BLD: 11.1 % (ref 18–48)
MAGNESIUM SERPL-MCNC: 2 MG/DL (ref 1.6–2.6)
MCH RBC QN AUTO: 16.3 PG (ref 27–31)
MCHC RBC AUTO-ENTMCNC: 26.8 G/DL (ref 32–36)
MCV RBC AUTO: 61 FL (ref 82–98)
MONOCYTES # BLD AUTO: 1.3 K/UL (ref 0.3–1)
MONOCYTES NFR BLD: 9.8 % (ref 4–15)
NEUTROPHILS # BLD AUTO: 10.7 K/UL (ref 1.8–7.7)
NEUTROPHILS NFR BLD: 77.9 % (ref 38–73)
NRBC BLD-RTO: 0 /100 WBC
PCO2 BLDA: 46.9 MMHG (ref 35–45)
PH SMN: 7.44 [PH] (ref 7.35–7.45)
PLATELET # BLD AUTO: 288 K/UL (ref 150–450)
PMV BLD AUTO: ABNORMAL FL (ref 9.2–12.9)
PO2 BLDA: 26 MMHG (ref 40–60)
POC BE: 8 MMOL/L
POC SATURATED O2: 51 % (ref 95–100)
POC TCO2: 33 MMOL/L (ref 24–29)
POCT GLUCOSE: 137 MG/DL (ref 70–110)
POCT GLUCOSE: 138 MG/DL (ref 70–110)
POCT GLUCOSE: 183 MG/DL (ref 70–110)
POCT GLUCOSE: 188 MG/DL (ref 70–110)
POCT GLUCOSE: 200 MG/DL (ref 70–110)
POCT GLUCOSE: 359 MG/DL (ref 70–110)
POCT GLUCOSE: 405 MG/DL (ref 70–110)
POTASSIUM SERPL-SCNC: 3.7 MMOL/L (ref 3.5–5.1)
PREALB SERPL-MCNC: 12 MG/DL (ref 20–43)
PROT SERPL-MCNC: 7.9 G/DL (ref 6–8.4)
PROTHROMBIN TIME: 23.8 SEC (ref 9–12.5)
RBC # BLD AUTO: 4.91 M/UL (ref 4.6–6.2)
SAMPLE: ABNORMAL
SITE: ABNORMAL
SODIUM SERPL-SCNC: 131 MMOL/L (ref 136–145)
WBC # BLD AUTO: 13.7 K/UL (ref 3.9–12.7)

## 2024-04-26 PROCEDURE — 83036 HEMOGLOBIN GLYCOSYLATED A1C: CPT | Performed by: INTERNAL MEDICINE

## 2024-04-26 PROCEDURE — 27000248 HC VAD-ADDITIONAL DAY

## 2024-04-26 PROCEDURE — 25000003 PHARM REV CODE 250

## 2024-04-26 PROCEDURE — 85730 THROMBOPLASTIN TIME PARTIAL: CPT | Performed by: INTERNAL MEDICINE

## 2024-04-26 PROCEDURE — 83880 ASSAY OF NATRIURETIC PEPTIDE: CPT | Performed by: INTERNAL MEDICINE

## 2024-04-26 PROCEDURE — 83735 ASSAY OF MAGNESIUM: CPT | Performed by: INTERNAL MEDICINE

## 2024-04-26 PROCEDURE — 63600175 PHARM REV CODE 636 W HCPCS

## 2024-04-26 PROCEDURE — 93750 INTERROGATION VAD IN PERSON: CPT | Mod: ,,, | Performed by: INTERNAL MEDICINE

## 2024-04-26 PROCEDURE — 99900035 HC TECH TIME PER 15 MIN (STAT)

## 2024-04-26 PROCEDURE — 99497 ADVNCD CARE PLAN 30 MIN: CPT | Mod: 25,,, | Performed by: CLINICAL NURSE SPECIALIST

## 2024-04-26 PROCEDURE — 99223 1ST HOSP IP/OBS HIGH 75: CPT | Mod: ,,, | Performed by: CLINICAL NURSE SPECIALIST

## 2024-04-26 PROCEDURE — 85610 PROTHROMBIN TIME: CPT | Performed by: INTERNAL MEDICINE

## 2024-04-26 PROCEDURE — 25000003 PHARM REV CODE 250: Performed by: STUDENT IN AN ORGANIZED HEALTH CARE EDUCATION/TRAINING PROGRAM

## 2024-04-26 PROCEDURE — 88738 HGB QUANT TRANSCUTANEOUS: CPT

## 2024-04-26 PROCEDURE — 85025 COMPLETE CBC W/AUTO DIFF WBC: CPT | Performed by: INTERNAL MEDICINE

## 2024-04-26 PROCEDURE — 84134 ASSAY OF PREALBUMIN: CPT | Performed by: INTERNAL MEDICINE

## 2024-04-26 PROCEDURE — 99232 SBSQ HOSP IP/OBS MODERATE 35: CPT | Mod: ,,,

## 2024-04-26 PROCEDURE — 25000003 PHARM REV CODE 250: Performed by: PHYSICIAN ASSISTANT

## 2024-04-26 PROCEDURE — 25000003 PHARM REV CODE 250: Performed by: INTERNAL MEDICINE

## 2024-04-26 PROCEDURE — 80053 COMPREHEN METABOLIC PANEL: CPT | Performed by: STUDENT IN AN ORGANIZED HEALTH CARE EDUCATION/TRAINING PROGRAM

## 2024-04-26 PROCEDURE — 63600175 PHARM REV CODE 636 W HCPCS: Performed by: INTERNAL MEDICINE

## 2024-04-26 PROCEDURE — A4216 STERILE WATER/SALINE, 10 ML: HCPCS | Performed by: INTERNAL MEDICINE

## 2024-04-26 PROCEDURE — 20600001 HC STEP DOWN PRIVATE ROOM

## 2024-04-26 PROCEDURE — 99233 SBSQ HOSP IP/OBS HIGH 50: CPT | Mod: ,,, | Performed by: INTERNAL MEDICINE

## 2024-04-26 PROCEDURE — 83615 LACTATE (LD) (LDH) ENZYME: CPT | Performed by: INTERNAL MEDICINE

## 2024-04-26 RX ORDER — POTASSIUM CHLORIDE 20 MEQ/1
40 TABLET, EXTENDED RELEASE ORAL ONCE
Status: COMPLETED | OUTPATIENT
Start: 2024-04-26 | End: 2024-04-26

## 2024-04-26 RX ORDER — INSULIN ASPART 100 [IU]/ML
18 INJECTION, SOLUTION INTRAVENOUS; SUBCUTANEOUS
Status: DISCONTINUED | OUTPATIENT
Start: 2024-04-26 | End: 2024-04-26

## 2024-04-26 RX ORDER — INSULIN ASPART 100 [IU]/ML
0-10 INJECTION, SOLUTION INTRAVENOUS; SUBCUTANEOUS
Status: DISCONTINUED | OUTPATIENT
Start: 2024-04-26 | End: 2024-04-27 | Stop reason: HOSPADM

## 2024-04-26 RX ORDER — INSULIN ASPART 100 [IU]/ML
INJECTION, SOLUTION INTRAVENOUS; SUBCUTANEOUS
Qty: 12 ML | Refills: 5 | Status: SHIPPED | OUTPATIENT
Start: 2024-04-26

## 2024-04-26 RX ORDER — IBUPROFEN 200 MG
24 TABLET ORAL
Status: DISCONTINUED | OUTPATIENT
Start: 2024-04-26 | End: 2024-04-27 | Stop reason: HOSPADM

## 2024-04-26 RX ORDER — WARFARIN 3 MG/1
TABLET ORAL
Qty: 60 TABLET | Refills: 5 | Status: ON HOLD | OUTPATIENT
Start: 2024-04-26 | End: 2024-05-26 | Stop reason: HOSPADM

## 2024-04-26 RX ORDER — GLUCAGON 3 MG/1
1 POWDER NASAL
Qty: 2 EACH | Refills: 3 | Status: SHIPPED | OUTPATIENT
Start: 2024-04-26

## 2024-04-26 RX ORDER — GLUCAGON 1 MG
1 KIT INJECTION
Status: DISCONTINUED | OUTPATIENT
Start: 2024-04-26 | End: 2024-04-27 | Stop reason: HOSPADM

## 2024-04-26 RX ORDER — AMLODIPINE BESYLATE 5 MG/1
5 TABLET ORAL DAILY
Qty: 90 TABLET | Refills: 3 | Status: SHIPPED | OUTPATIENT
Start: 2024-04-27 | End: 2025-04-27

## 2024-04-26 RX ORDER — PEN NEEDLE, DIABETIC 30 GX3/16"
1 NEEDLE, DISPOSABLE MISCELLANEOUS 4 TIMES DAILY
Qty: 200 EACH | Refills: 11 | Status: SHIPPED | OUTPATIENT
Start: 2024-04-26

## 2024-04-26 RX ORDER — CYCLOBENZAPRINE HCL 5 MG
5 TABLET ORAL 3 TIMES DAILY PRN
Qty: 30 TABLET | Refills: 0 | Status: SHIPPED | OUTPATIENT
Start: 2024-04-26 | End: 2024-05-06

## 2024-04-26 RX ORDER — INSULIN ASPART 100 [IU]/ML
0-10 INJECTION, SOLUTION INTRAVENOUS; SUBCUTANEOUS
Status: DISCONTINUED | OUTPATIENT
Start: 2024-04-26 | End: 2024-04-26

## 2024-04-26 RX ORDER — POTASSIUM CHLORIDE 750 MG/1
30 TABLET, EXTENDED RELEASE ORAL DAILY
Qty: 90 TABLET | Refills: 3 | Status: SHIPPED | OUTPATIENT
Start: 2024-04-26

## 2024-04-26 RX ORDER — INSULIN ASPART 100 [IU]/ML
20 INJECTION, SOLUTION INTRAVENOUS; SUBCUTANEOUS
Status: DISCONTINUED | OUTPATIENT
Start: 2024-04-26 | End: 2024-04-26

## 2024-04-26 RX ORDER — BLOOD-GLUCOSE SENSOR
1 EACH MISCELLANEOUS
Qty: 3 EACH | Refills: 9 | Status: SHIPPED | OUTPATIENT
Start: 2024-04-26 | End: 2025-04-26

## 2024-04-26 RX ORDER — IBUPROFEN 200 MG
16 TABLET ORAL
Status: DISCONTINUED | OUTPATIENT
Start: 2024-04-26 | End: 2024-04-27 | Stop reason: HOSPADM

## 2024-04-26 RX ORDER — INSULIN ASPART 100 [IU]/ML
20 INJECTION, SOLUTION INTRAVENOUS; SUBCUTANEOUS
Status: DISCONTINUED | OUTPATIENT
Start: 2024-04-26 | End: 2024-04-27 | Stop reason: HOSPADM

## 2024-04-26 RX ADMIN — MILRINONE LACTATE IN DEXTROSE 0.25 MCG/KG/MIN: 200 INJECTION, SOLUTION INTRAVENOUS at 06:04

## 2024-04-26 RX ADMIN — INSULIN DETEMIR 20 UNITS: 100 INJECTION, SOLUTION SUBCUTANEOUS at 09:04

## 2024-04-26 RX ADMIN — AMLODIPINE BESYLATE 5 MG: 5 TABLET ORAL at 09:04

## 2024-04-26 RX ADMIN — Medication 400 MG: at 09:04

## 2024-04-26 RX ADMIN — Medication 10 ML: at 05:04

## 2024-04-26 RX ADMIN — INSULIN ASPART 20 UNITS: 100 INJECTION, SOLUTION INTRAVENOUS; SUBCUTANEOUS at 06:04

## 2024-04-26 RX ADMIN — FOLIC ACID-PYRIDOXINE-CYANOCOBALAMIN TAB 2.5-25-2 MG 1 TABLET: 2.5-25-2 TAB at 09:04

## 2024-04-26 RX ADMIN — GABAPENTIN 300 MG: 300 CAPSULE ORAL at 09:04

## 2024-04-26 RX ADMIN — INSULIN ASPART 20 UNITS: 100 INJECTION, SOLUTION INTRAVENOUS; SUBCUTANEOUS at 01:04

## 2024-04-26 RX ADMIN — FUROSEMIDE 80 MG: 80 TABLET ORAL at 09:04

## 2024-04-26 RX ADMIN — WARFARIN SODIUM 3 MG: 3 TABLET ORAL at 05:04

## 2024-04-26 RX ADMIN — ATORVASTATIN CALCIUM 40 MG: 20 TABLET, FILM COATED ORAL at 09:04

## 2024-04-26 RX ADMIN — INSULIN ASPART 10 UNITS: 100 INJECTION, SOLUTION INTRAVENOUS; SUBCUTANEOUS at 01:04

## 2024-04-26 RX ADMIN — ASPIRIN 81 MG: 81 TABLET, COATED ORAL at 09:04

## 2024-04-26 RX ADMIN — MIRTAZAPINE 15 MG: 15 TABLET, FILM COATED ORAL at 09:04

## 2024-04-26 RX ADMIN — INSULIN ASPART 2 UNITS: 100 INJECTION, SOLUTION INTRAVENOUS; SUBCUTANEOUS at 01:04

## 2024-04-26 RX ADMIN — INSULIN DETEMIR 20 UNITS: 100 INJECTION, SOLUTION SUBCUTANEOUS at 10:04

## 2024-04-26 RX ADMIN — MILRINONE LACTATE IN DEXTROSE 0.25 MCG/KG/MIN: 200 INJECTION, SOLUTION INTRAVENOUS at 10:04

## 2024-04-26 RX ADMIN — LEVETIRACETAM 1500 MG: 500 TABLET, FILM COATED ORAL at 09:04

## 2024-04-26 RX ADMIN — POTASSIUM CHLORIDE 40 MEQ: 1500 TABLET, EXTENDED RELEASE ORAL at 08:04

## 2024-04-26 RX ADMIN — SPIRONOLACTONE 25 MG: 25 TABLET ORAL at 09:04

## 2024-04-26 RX ADMIN — Medication 10 ML: at 01:04

## 2024-04-26 RX ADMIN — PANTOPRAZOLE SODIUM 40 MG: 40 TABLET, DELAYED RELEASE ORAL at 09:04

## 2024-04-26 RX ADMIN — INSULIN ASPART 18 UNITS: 100 INJECTION, SOLUTION INTRAVENOUS; SUBCUTANEOUS at 09:04

## 2024-04-26 RX ADMIN — Medication 10 ML: at 06:04

## 2024-04-26 RX ADMIN — Medication 10 ML: at 12:04

## 2024-04-26 RX ADMIN — INSULIN ASPART 2 UNITS: 100 INJECTION, SOLUTION INTRAVENOUS; SUBCUTANEOUS at 09:04

## 2024-04-26 NOTE — PROGRESS NOTES
04/26/2024  John Alaniz    Current provider:  Natalya Villatoro MD    Device interrogation:      4/26/2024     8:00 AM 4/26/2024     5:39 AM 4/26/2024     1:24 AM 4/25/2024     8:00 PM 4/25/2024     4:18 PM 4/25/2024    12:27 PM 4/25/2024     7:50 AM   TXP LVAD INTERROGATIONS   Type HeartMate3  HeartMate3 HeartMate3 HeartMate3 HeartMate3 HeartMate3   Flow 4.4 4.3 4.1 4.1 4.1 4.3 4.3   Speed 5100 5100 5100 5100 5100 5100 5100   PI 4.8 5 5.6 5.1 5.4 4.8 5.1   Power (Serna) 3.7 3.6 3.6 3.6 3.6 3.1 3.7   LSL 4700    4700 4700 4700   Pulsatility Pulse Intermittent pulse Intermittent pulse Intermittent pulse Intermittent pulse Intermittent pulse Intermittent pulse          Rounded on Kevan Queen to ensure all mechanical assist device settings (IABP or VAD) were appropriate and all parameters were within limits.  I was able to ensure all back up equipment was present, the staff had no issues, and the Perfusion Department daily rounding was complete.      For implantable VADs: Interrogation of Ventricular assist device was performed with analysis of device parameters and review of device function. I have personally reviewed the interrogation findings and agree with findings as stated.     In emergency, the nursing units have been notified to contact the perfusion department either by:  Calling [phone] from 630am to 4pm Mon thru Fri, utilizing the On-Call Finder functionality of Epic and searching for Perfusion, or by contacting the hospital  from 4pm to 630am and on weekends and asking to speak with the perfusionist on call.    2:55 PM

## 2024-04-26 NOTE — PLAN OF CARE
Recommendations    1. Continue 2000 Calorie Diabetic Diet, fluid per MD.   2. If PO intake inadequate, consuming <50% at meals add Boost GC BID.   3. RD following.    Goals: Meet % EEN, EPN by RD f/u date  Nutrition Goal Status: goal met  Communication of RD Recs: other (comment)

## 2024-04-26 NOTE — ASSESSMENT & PLAN NOTE
-NICM s/p HM 3 on home Milrinone   -Last 2D Echo  9/5/23 : LVEF 10-15%, LVEDD  7.11 cm with speed at 5100  -Last RHC: 10/31/22 with speed at 5100 on Milrinone 0.125 RA: 18, RV: 35/7 (22), PAP: 35/19 (24), PWP: 21, CO: 3.55, CI: 1.6  -Patient near euvolemic. Continue PO lasix 80mg daily.   -GDMT with N/A  -2g Na dietary restriction, 1500 mL fluid restriction, strict I/Os

## 2024-04-26 NOTE — ASSESSMENT & PLAN NOTE
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  -Continue Coumadin; INR goal 2.0-3.0,  INR is therapeutic today   -Antiplatelets: ASA 81  -LDH is stable.  Will continue to monitor daily  -Speed set at 5100, LSL 4700 rpm  -Echo: 9/5/23 EF: 10-15%, LVEDD: 7.11cm.        Procedure: Device Interrogation Including analysis of device parameters  Current Settings: Ventricular Assist Device  Review of device function is stable        4/26/2024     8:00 AM 4/26/2024     5:39 AM 4/26/2024     1:24 AM 4/25/2024     8:00 PM 4/25/2024     4:18 PM 4/25/2024    12:27 PM 4/25/2024     7:50 AM   TXP LVAD INTERROGATIONS   Type HeartMate3  HeartMate3 HeartMate3 HeartMate3 HeartMate3 HeartMate3   Flow 4.4 4.3 4.1 4.1 4.1 4.3 4.3   Speed 5100 5100 5100 5100 5100 5100 5100   PI 4.8 5 5.6 5.1 5.4 4.8 5.1   Power (Serna) 3.7 3.6 3.6 3.6 3.6 3.1 3.7   LSL 4700    4700 4700 4700   Pulsatility Pulse Intermittent pulse Intermittent pulse Intermittent pulse Intermittent pulse Intermittent pulse Intermittent pulse

## 2024-04-26 NOTE — PROGRESS NOTES
"DISCHARGE NOTE:    Kevan Queen is a 39 y.o. male s/p HM3 VAD who will be discharged today after treatment for volume overload and hyperglycemia.     Pharmacy Interventions/Recommendations:  1) INR Goal: 2-3     2) Antiplatelet Agents: ASA 81mg/day     3) Heparin Bridging:  MD discretion     4) INR Follow-Up/Discharge Needs:  Repeat INR next week.     See list of discharge medication for dosing instructions.     Kevan Queen and his caregiver verbalized their understanding and had the opportunity to ask questions.      Discharge Medications:     Medication List        START taking these medications      amLODIPine 5 MG tablet  Commonly known as: NORVASC  Take 1 tablet (5 mg total) by mouth once daily.  Start taking on: April 27, 2024     BAQSIMI 3 mg/actuation Spry  Generic drug: glucagon  1 each by Nasal route as needed (hypoglycemia).     cyclobenzaprine 5 MG tablet  Commonly known as: FLEXERIL  Take 1 tablet (5 mg total) by mouth 3 (three) times daily as needed for Muscle spasms.     FREESTYLE LUCIUS 3 SENSOR Dee  Generic drug: blood-glucose sensor  1 Device by Misc.(Non-Drug; Combo Route) route every 14 (fourteen) days.            CHANGE how you take these medications      BD ULTRA-FINE GARTH PEN NEEDLE 32 gauge x 5/32" Ndle  Generic drug: pen needle, diabetic  1 each by Misc.(Non-Drug; Combo Route) route 4 (four) times daily.  What changed: when to take this     blood-glucose meter Misc  Commonly known as: TRUE METRIX GLUCOSE METER  Use to test blood glucose 4 (four) times daily.  What changed: Another medication with the same name was removed. Continue taking this medication, and follow the directions you see here.     insulin aspart U-100 100 unit/mL (3 mL) Inpn pen  Commonly known as: NovoLOG  Inject 18 Units into the skin 3 (three) times daily with meals: MAX 94 units/daily  150-200    201-250    251-300    301-350    >350  +2 units   +4 units    +6 units    +8 units    +10 units  What " changed: See the new instructions.     LEVEMIR FLEXPEN 100 unit/mL (3 mL) Inpn pen  Generic drug: insulin detemir U-100 (Levemir)  Inject 20 Units into the skin 2 (two) times daily. In the morning and before bed.  What changed:   how much to take  when to take this  additional instructions     potassium chloride SA 10 MEQ tablet  Commonly known as: K-DUR,KLOR-CON M  Take 3 tablets (30 mEq total) by mouth once daily.  What changed:   medication strength  how much to take  when to take this  additional instructions     warfarin 3 MG tablet  Commonly known as: COUMADIN  Take 1.5 tablets (4.5mg) orally daily, except 1 tablet (3mg) on Sundays and Fridays  What changed: additional instructions            CONTINUE taking these medications      acetaminophen 325 MG tablet  Commonly known as: TYLENOL  Take 2 tablets (650 mg total) by mouth every 6 (six) hours as needed for Pain.     aspirin 81 MG EC tablet  Commonly known as: ECOTRIN  Take 1 tablet (81 mg total) by mouth once daily.     atorvastatin 40 MG tablet  Commonly known as: LIPITOR  Take 1 tablet (40 mg total) by mouth once daily.     blood sugar diagnostic Strp  Use to test blood glucose 4 (four) times daily.     cefadroxil 500 MG Cap  Commonly known as: DURICEF  Take 1 capsule (500 mg total) by mouth every 12 (twelve) hours.     furosemide 80 MG tablet  Commonly known as: LASIX  Take 1 tablet (80 mg total) by mouth once daily.     lancets 33 gauge Misc  Use to test blood glucose 4 (four) times daily.     levETIRAcetam 1000 MG tablet  Commonly known as: KEPPRA  Take 1.5 tablets (1,500 mg total) by mouth 2 (two) times daily.     magnesium oxide 400 mg (241.3 mg magnesium) tablet  Commonly known as: MAG-OX  Take 1 tablet (400 mg total) by mouth once daily.     milrinone 20mg/100ml D5W (200mcg/ml) 20 mg/100 mL (200 mcg/mL) infusion  Commonly known as: PRIMACOR  Inject 23.6 mcg/min into the vein continuous.     mirtazapine 15 MG tablet  Commonly known as: REMERON  Take  "1 tablet (15 mg total) by mouth nightly.     ondansetron 4 MG Tbdl  Commonly known as: ZOFRAN-ODT  Take 1 tablet (4 mg total) by mouth every 8 (eight) hours as needed (nausea).     pantoprazole 40 MG tablet  Commonly known as: PROTONIX  Take 1 tablet (40 mg total) by mouth once daily.     spironolactone 25 MG tablet  Commonly known as: ALDACTONE  Take 1 tablet (25 mg total) by mouth once daily.            STOP taking these medications      polyethylene glycol 17 gram Pwpk  Commonly known as: GLYCOLAX     sodium chloride 0.9% SolP 50 mL with DAPTOmycin 500 mg SolR 688 mg               Where to Get Your Medications        These medications were sent to [facility]  [address], [place] [address]      Hours: Always Open Phone: [phone]   amLODIPine 5 MG tablet  BD ULTRA-FINE GARTH PEN NEEDLE 32 gauge x 5/32" Ndle  cyclobenzaprine 5 MG tablet  FREESTYLE LUCIUS 3 SENSOR Dee  insulin aspart U-100 100 unit/mL (3 mL) Inpn pen  LEVEMIR FLEXPEN 100 unit/mL (3 mL) Inpn pen  potassium chloride SA 10 MEQ tablet  warfarin 3 MG tablet       These medications were sent to [id number] - [place] [facility]  [address] [facility]  AT [facility] [address] [facility] [address] [facility] [address] [place] [address]      Phone: [phone]   BAQSIMI 3 mg/actuation Spry        "

## 2024-04-26 NOTE — ASSESSMENT & PLAN NOTE
BG goal: 140-180    - IIP d/c'd overnight, transition insulin gtt @ 2.0 u/hr with stepdown parameters initiated. Notified by team of plans to d/c pt today- given this will transition to SQ basal regimen of 20 units levemir BID (equivalent to gtt rate in which BG stable at goal)   - Novolog 18 units TIDWM   - [facility] SSI (150/25) prn ac/hs/0200  - POCT Glucose before meals, at bedtime and at 2 am  - Hypoglycemia protocol in place      ** Please notify Endocrine for any change and/or advance in diet**  ** Please call Endocrine for any BG related issues **     Discharge Planning:   - on home milrinone   -Discharge Planning:   - Levemir 20 units BID  (Eat a snack before bed if BG is < 100 mg/dl)  - Novolog 18 units TIDWM (Hold if BG < 100 mg/dL)  - Novolog SSI for BG excursions:  Add correction scale if needed.  Blood sugar 150 to 200 add 2 units  Blood sugar 201 to 250 add 4 units  Blood sugar 251 to 300 add 6 units  Blood sugar 301 to 350 add 8 units  Blood sugar greater than 350 add 10 units  - Insurance approved diabetes testing supplies to check blood sugar 4 times a day (Before meals and at bedtime) and as needed.  - BD pen needles   - Insurance approved glucagon for extreme hypoglycemia (Baqsimi or [place] if insurance approved)  - Send BG logs in 4 days  - Follow-up in discharge clinic in 2 weeks.     Education:  Discharge Teaching:    Reviewed topics related to DM including: the need for insulin, how insulin works, what makes it a high risk medication, the importance of immediate follow up with either PCP or endocrine provider, importance of and how to check BG, how to record BG on logs, how to administer insulin, appropriate insulin administration sites, importance of rotating injection sites, hyper/hypoglycemia, how and when to treat hypoglycemia, when to hold insulin, how the correction scale works, importance of storing unused insulin in the refrigerator, and when to seek medical attention.  Patient  verbalized understanding, answered all questions to patient's satisfaction. Blood sugar logs given to patient.     Hypoglycemia (Low Blood Sugar)  Too little glucose (sugar) in your blood is called hypoglycemia or low blood sugar. Diabetes itself doesnt cause low blood sugar. But some of the treatments for diabetes, such as pills or insulin, may increase your risk for it. Low blood sugar may cause you to lose consciousness or have a seizure. So always treat low blood sugar right away.    Special note: Always carry a source of fast-acting sugar and a snack in case of hypoglycemia     What You May Notice  If you have low blood sugar, you may have these symptoms:  Shakiness or dizziness  Cold, clammy skin or sweating  Feelings of hunger  Headache  Nervousness  A hard, fast heartbeat  Weakness  Confusion or irritability  Blurred vision  What You Should Do  First, check your blood sugar. If it is too low (out of your target range), eat or drink 15 to 20 grams of fast-acting sugar. This may be 3 to 4 glucose tablets, 4 oz (half a cup) fruit juice or regular (non-diet) soda, 8 oz (one cup ) fat-free milk, or 1 tablespoon of honey. Dont take more than this, or your blood sugar may go too high.  Wait 15 minutes. Then recheck your blood sugar if you can.  If your blood sugar is still too low, repeat the steps above and check your blood sugar again. If your blood sugar still has not returned to your target range, contact your healthcare provider or seek emergency care.  Once your blood sugar returns to target range, eat a snack or meal.  Preventing Low Blood Sugar  Eat your meals and snacks at the same times each day. Dont skip meals!  Ask your healthcare provider if it is safe for you to drink alcohol. Never drink on an empty stomach.  Take your medication at the prescribed times.  Always carry a source of fast-acting sugar and a snack when youre away from home.  Other Things to Do  Carry a medical ID card or wear a  medical alert bracelet or necklace. It should say that you have diabetes. It should also say what to do if you pass out or have a seizure.  Make sure your family, friends, and coworkers know the signs of low blood sugar. Tell them what to do if your blood sugar falls very low and you cant treat yourself.  Keep a glucagon emergency kit handy. Be sure your family, friends, and coworkers know how and when to use it. Check it regularly and replace the glucagon before it expires.  Talk to your healthcare team about other things you can do to prevent low blood sugar.    If you experience hypoglycemia several times, call your doctor.   © 6422-6397 [facility], [address] [place] [address], [place] [address]. All rights reserved. This information is not intended as a substitute for professional medical care. Always follow your healthcare professional's instructions.

## 2024-04-26 NOTE — NURSING
Patient aaox4. Hm 3 connected to battery. Battery fully charged and settings wnl. Offered patient assistance to connect from battery to wall monitor before going to sleep. Patient refused to change from battery  to wall monitor. Gave patient education on being monitored while  in the hospital and asked patient again if he wanted assistance. Patient verbalized wanting to stay hooked  up to battery and refused assistance.

## 2024-04-26 NOTE — SUBJECTIVE & OBJECTIVE
Interval History: NAEON. No acute complaints. Discharging home today. Urinated 3.3L on PO lasix 80mg dose.     Continuous Infusions:  Current Facility-Administered Medications   Medication Dose Route Frequency Last Rate Last Admin    milrinone 20mg/100ml D5W (200mcg/ml)  0.25 mcg/kg/min Intravenous Continuous 7.1 mL/hr at 04/26/24 0620 0.25 mcg/kg/min at 04/26/24 0620     Scheduled Meds:  Current Facility-Administered Medications   Medication Dose Route Frequency    amLODIPine  5 mg Oral Daily    aspirin  81 mg Oral Daily    atorvastatin  40 mg Oral Daily    folic acid-vit B6-vit B12 2.5-25-2 mg  1 tablet Oral Daily    furosemide  80 mg Oral Daily    gabapentin  300 mg Oral Daily    insulin detemir U-100  20 Units Subcutaneous BID    levETIRAcetam  1,500 mg Oral BID    magnesium oxide  400 mg Oral Daily    mirtazapine  15 mg Oral Nightly    pantoprazole  40 mg Oral Daily    sodium chloride 0.9%  10 mL Intravenous Q6H    spironolactone  25 mg Oral Daily    warfarin  3 mg Oral Once per day on Sunday Friday    warfarin  4.5 mg Oral Once per day on Monday Tuesday Wednesday Thursday Saturday     PRN Meds:  Current Facility-Administered Medications:     acetaminophen, 650 mg, Oral, Q6H PRN    cyclobenzaprine, 5 mg, Oral, TID PRN    dextrose 10%, 12.5 g, Intravenous, PRN    dextrose 10%, 12.5 g, Intravenous, PRN    dextrose 10%, 12.5 g, Intravenous, PRN    dextrose 10%, 12.5 g, Intravenous, PRN    dextrose 10%, 25 g, Intravenous, PRN    dextrose 10%, 25 g, Intravenous, PRN    dextrose 10%, 25 g, Intravenous, PRN    dextrose 10%, 25 g, Intravenous, PRN    ondansetron, 4 mg, Oral, Q8H PRN    Flushing PICC/Midline Protocol, , , Until Discontinued **AND** sodium chloride 0.9%, 10 mL, Intravenous, Q6H **AND** sodium chloride 0.9%, 10 mL, Intravenous, PRN    Review of patient's allergies indicates:   Allergen Reactions    Bumex [bumetanide] Hives    Torsemide Hives     Objective:     Vital Signs (Most Recent):  Temp: 98.8 °F  (37.1 °C) (04/26/24 0755)  Pulse: (!) 119 (04/26/24 0755)  Resp: 18 (04/26/24 0755)  BP: 96/72 (04/26/24 0758)  SpO2: 99 % (04/26/24 0755) Vital Signs (24h Range):  Temp:  [96.8 °F (36 °C)-99.2 °F (37.3 °C)] 98.8 °F (37.1 °C)  Pulse:  [114-127] 119  Resp:  [18] 18  SpO2:  [97 %-100 %] 99 %  BP: ()/(0-77) 96/72     Patient Vitals for the past 72 hrs (Last 3 readings):   Weight   04/26/24 0755 73.9 kg (162 lb 14.7 oz)   04/24/24 0453 77.2 kg (170 lb 3.1 oz)     Body mass index is 20.36 kg/m².      Intake/Output Summary (Last 24 hours) at 4/26/2024 1013  Last data filed at 4/26/2024 0133  Gross per 24 hour   Intake 2110 ml   Output 2800 ml   Net -690 ml       Hemodynamic Parameters:       Telemetry: reviewed     Physical Exam  Vitals and nursing note reviewed.   Constitutional:       Appearance: Normal appearance.   HENT:      Head: Normocephalic and atraumatic.      Nose: Nose normal.   Eyes:      Pupils: Pupils are equal, round, and reactive to light.   Neck:      Vascular: JVD present.      Comments: JVP 12cm  Cardiovascular:      Rate and Rhythm: Normal rate and regular rhythm.      Comments: Smooth VAD hum  Pulmonary:      Effort: Pulmonary effort is normal.      Breath sounds: Normal breath sounds.   Abdominal:      General: Abdomen is flat. There is no distension.      Palpations: Abdomen is soft.   Musculoskeletal:         General: Normal range of motion.   Skin:     General: Skin is warm and dry.      Capillary Refill: Capillary refill takes 2 to 3 seconds.   Neurological:      General: No focal deficit present.      Mental Status: He is alert and oriented to person, place, and time.   Psychiatric:         Mood and Affect: Mood normal.         Behavior: Behavior normal.            Significant Labs:  CBC:  Recent Labs   Lab 04/24/24  0530 04/25/24  0432 04/26/24  0527   WBC 10.23 11.34 13.70*   RBC 4.62 4.91 4.91   HGB 7.8* 8.3* 8.0*   HCT 28.6* 29.8* 29.9*    268 288   MCV 62* 61* 61*   MCH 16.9*  16.9* 16.3*   MCHC 27.3* 27.9* 26.8*     BNP:  Recent Labs   Lab 04/22/24  0516 04/24/24  0530 04/26/24  0527   * 285* 265*     CMP:  Recent Labs   Lab 04/24/24  0530 04/24/24  1656 04/25/24  0432 04/25/24  1706 04/26/24 0527   *  --  510*  --  152*   CALCIUM 8.9  --  8.7  --  9.4   ALBUMIN 2.8*  --  2.9*  --  2.8*   PROT 7.4  --  8.0  --  7.9   *  --  127*  --  131*   K 3.8   < > 3.5 3.7 3.7   CO2 27  --  28  --  27   CL 99  --  88*  --  95   BUN 15  --  18  --  19   CREATININE 1.1  --  1.4  --  1.2   ALKPHOS 186*  --  206*  --  200*   ALT 23  --  23  --  20   AST 42*  --  35  --  33   BILITOT 1.7*  --  1.9*  --  1.9*    < > = values in this interval not displayed.      Coagulation:   Recent Labs   Lab 04/24/24  0530 04/25/24 0432 04/26/24  0527   INR 2.0* 2.2* 2.3*   APTT 33.3* 36.0* 34.0*     LDH:  Recent Labs   Lab 04/24/24 0530 04/25/24 0432 04/26/24 0527   * 344* 270*     Microbiology:  Microbiology Results (last 7 days)       Procedure Component Value Units Date/Time    Blood culture x two cultures. Draw prior to antibiotics. [[id number]] Collected: 04/20/24 0210    Order Status: Completed Specimen: Blood from Peripheral, Hand, Left Updated: 04/25/24 0612     Blood Culture, Routine No growth after 5 days.    Narrative:      Aerobic and anaerobic    Blood culture x two cultures. Draw prior to antibiotics. [[id number]] Collected: 04/20/24 0209    Order Status: Completed Specimen: Blood from Peripheral, Antecubital, Left Updated: 04/25/24 0612     Blood Culture, Routine No growth after 5 days.    Narrative:      Aerobic and anaerobic            I have reviewed all pertinent labs within the past 24 hours.    Estimated Creatinine Clearance: 86.4 mL/min (based on SCr of 1.2 mg/dL).    Diagnostic Results:  I have reviewed all pertinent imaging results/findings within the past 24 hours.   Yes

## 2024-04-26 NOTE — PROGRESS NOTES
Discharge    Pt presents in room aaox4 with open affect. Caregiver not present at this time. Pt voices agreement with plan to discharge to home today. SW informed [facility] that orders are in. Pt will need to wait for milrinone to be delivered to bedside prior to leaving. Spouse will transport pt home. Pt reports coping well and denies further needs, questions, concerns at this time and none indicated. Providing psychosocial and counseling support, education, resources, assistance and discharge planning as indicated. SW remains available.    Addendum: [facility] states pt phoned their office and informed them that his spouse was bringing a bag of Milrinone from home. RN later informed SW that spouse did not bring a bag. SW phoned [facility] who state they will bring bag of Milrinone. Pt aware he will have to wait for some time due to late notification.

## 2024-04-26 NOTE — HPI
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 He underwent RVAD removal and chest closure 6/30/2022.  He also completed a course of IV Abx for staph epi. He was weaned off  but he had to restarted due to RVF. He was eventually transitioned to milrinone (secondary to  shortage) now on 0.25 mcg/kg/min. He has a history of unclear syncope vs seizures and is followed by neuro for this and is on Keppra.       On 11/15/23 underwent removal of eroded [employer] scICD--no Lifevest (due to LVAD) and no plan to replace ICD as not requiring therapy post LVAD with concern for reinfection.  He has not had neurology f/u with seizure hx on keppra so coordinator to assist him with obtaining appt.  Patient presents for concerns for fever, vomiting and pain and concerns of driveline infection, low INR and hyperglycemia. Beta hydroxybutyrate negative. Lactic acid negative. Normal WBC count and CT Abd/Pelv with contrast without signs of driveline infection. Had elevated blood glucose 03/19/2024 and was advised to go to ED. Also advised to go to ED in the first week of April for fever of 102*F (reported) but did not go at that time. Patient reports that he has been taking lower doses of Lasix at home as compared to what has been prescribed. Exam consistent with hypervolemia. Bedside TTE with IVC 2.5 cm and non collapsible IVC. No inflow outflow cannula issues visualized.     Pt to f/u in [facility] clinic telemedicine.

## 2024-04-26 NOTE — PLAN OF CARE
Pt maintained free from falls/trauma/injuries and skin breakdown. Pt denied pain or discomfort. HM 3 with speed of 5100. LVAD hum smooth, no alarms activated throughout shift. Patient refused wall monitor before  going  to sleep. Vitals wnl throughout shift. Double lumen KELLY PICC.  Insulin gtt running at 1.6 units/hr and blood  glucose  monitoring q 4  hours. Milirone gtt running at 2.5 mcg/kg. Plan of care reviewed. Pt verbalized understanding. All questions and concerns addressed.       Problem: Adult Inpatient Plan of Care  Goal: Plan of Care Review  Outcome: Progressing  Goal: Patient-Specific Goal (Individualized)  Outcome: Progressing  Goal: Absence of Hospital-Acquired Illness or Injury  Outcome: Progressing  Goal: Optimal Comfort and Wellbeing  Outcome: Progressing  Goal: Readiness for Transition of Care  Outcome: Progressing     Problem: Infection  Goal: Absence of Infection Signs and Symptoms  Outcome: Progressing     Problem: Diabetes Comorbidity  Goal: Blood Glucose Level Within Targeted Range  Outcome: Progressing     Problem: Fall Injury Risk  Goal: Absence of Fall and Fall-Related Injury  Outcome: Progressing     Problem: Adjustment to Illness (Heart Failure)  Goal: Optimal Coping  Outcome: Progressing     Problem: Cardiac Output Decreased (Heart Failure)  Goal: Optimal Cardiac Output  Outcome: Progressing     Problem: Dysrhythmia (Heart Failure)  Goal: Stable Heart Rate and Rhythm  Outcome: Progressing     Problem: Fluid Imbalance (Heart Failure)  Goal: Fluid Balance  Outcome: Progressing     Problem: Functional Ability Impaired (Heart Failure)  Goal: Optimal Functional Ability  Outcome: Progressing     Problem: Oral Intake Inadequate (Heart Failure)  Goal: Optimal Nutrition Intake  Outcome: Progressing     Problem: Respiratory Compromise (Heart Failure)  Goal: Effective Oxygenation and Ventilation  Outcome: Progressing     Problem: Sleep Disordered Breathing (Heart Failure)  Goal: Effective  Breathing Pattern During Sleep  Outcome: Progressing     Problem: Adjustment to Device (Ventricular Assist Device)  Goal: Optimal Adjustment to Device  Outcome: Progressing     Problem: Bleeding (Ventricular Assist Device)  Goal: Absence of Bleeding  Outcome: Progressing     Problem: Embolism (Ventricular Assist Device)  Goal: Absence of Embolism Signs and Symptoms  Outcome: Progressing     Problem: Hemodynamic Instability (Ventricular Assist Device)  Goal: Optimal Blood Flow  Outcome: Progressing     Problem: Infection (Ventricular Assist Device)  Goal: Absence of Infection Signs and Symptoms  Outcome: Progressing     Problem: Right-Sided Heart Compromise (Ventricular Assist Device)  Goal: Effective Right-Sided Heart Function  Outcome: Progressing     Problem: Skin Injury Risk Increased  Goal: Skin Health and Integrity  Outcome: Progressing     Problem: Wound  Goal: Optimal Coping  Outcome: Progressing  Goal: Optimal Functional Ability  Outcome: Progressing  Goal: Absence of Infection Signs and Symptoms  Outcome: Progressing  Goal: Improved Oral Intake  Outcome: Progressing  Goal: Optimal Pain Control and Function  Outcome: Progressing  Goal: Skin Health and Integrity  Outcome: Progressing  Goal: Optimal Wound Healing  Outcome: Progressing

## 2024-04-26 NOTE — PLAN OF CARE
VSS. BG monitored. LVAD number and doppler WNL. LVAD dressing and PICC line dressing changed per protocol ( see note). Pt given discharge instruction, medication reviewed with the pt, follow up appts reviewed. All questions and concerns addressed. Pt verbalized understanding. Discharge paperwork given to patient. Pt in room waiting for his wife to bring milrinone from home, and transport.

## 2024-04-26 NOTE — ASSESSMENT & PLAN NOTE
Impression: Pt is a 40 y/o male with advanced heart failure. Pt has LVAD in place. Pt admitted for ac on chronic combined systolic and diastolic heart failure. Pt is alert, oriented to person, place, time, and situation.     Reason for consult: ACP. Per MD, pt not taking lasix due to cramping.       Goals of care/ACP:  Met with pt who is aware of his medicla issues. Per pt he does not take his lasix all the time due to sever cramping that can last hours. Per pt, this affects his QOL. Pt reports he is aware he needs to take the medication. Pt reports muscle relaxer has helped and is agreeable to take meds.   Pt open to following up with Dr. Choe in [facility] clincic for continued ACP/symptom management.     Pt reports he still lives with FLORIN Carlson and two of his children.     Code status: Full.   MPOA: FLORIN Robyn.     Symptom management:     Cramping from lasix:   Current meds:   Pt on cyclobenzaprine tid prn muscle cramps.   Pt reports helps with cramping.     VAD (left ventricular assist device) present  -HeartMate 3 Implanted on 6/29/2022 as DT with RV failure on milrinone at 0.25 mcg/kg/min.    Pt denies pain, N/V, anxiety, constipation.     Plan:   Will schedule f/u in clinic.

## 2024-04-26 NOTE — PROGRESS NOTES
Pt AAAO, no family at bedside.  Talked with Kevan about hospital follow up and getting his sugars under control.  He told me he doesn't know how to do that.  I explained he should go to his endocrine appt once it's made.  I asked who is the best person to contact regarding appts and he said Robyn.  Kevan has 2 phones with him but said he is usually sleeping or cramping and can't answer the phone.    I also asked about the kids and if the oldest is on spring break or is home schooled.  He told me they just got his birth certificate so he will start  next year along with daughter.  I explained that will be so good for them and they will probably love it.  It will also give him and Robyn a little time alone during the day. Kevan verbalized agreement.  No questions for me at this time.

## 2024-04-26 NOTE — CONSULTS
"  [facility] - Cardiology Stepdown  Adult Nutrition  Consult Note    SUMMARY     Recommendations    1. Continue 2000 Calorie Diabetic Diet, fluid per MD.   2. If PO intake inadequate, consuming <50% at meals add Boost GC BID.   3. RD following.    Goals: Meet % EEN, EPN by RD f/u date  Nutrition Goal Status: goal met  Communication of RD Recs: other (comment)    Assessment and Plan    Nutrition Problem  Altered food- and nutrition-related lab values    Related to (etiology):   Disordered eating patterns    Signs and Symptoms (as evidenced by):   A1C >14%     Interventions/Recommendations (treatment strategy):  Collaboration of nutrition care with other providers  Nutrition education    Nutrition Diagnosis Status:   New        Reason for Assessment    Reason For Assessment: RD follow-up, consult  Diagnosis: other (see comments) (HF)  Relevant Medical History: CHF, DM, LVAD (6/2022), Polysubstance abuse  Interdisciplinary Rounds: did not attend  General Information Comments: Pt tolerating diabetic diet. RD consulted for DM education, provided 4/20 reinforced today (4/26). No c/o N/V/C/D, denies difficulty chewing/swallowing.  Nutrition Discharge Planning: Adequate PO intake    Nutrition Risk Screen    Nutrition Risk Screen: no indicators present    Nutrition/Diet History    Patient Reported Diet/Restrictions/Preferences: general  Spiritual, Cultural Beliefs, [employer] Practices, Values that Affect Care: no  Food Allergies: NKFA  Factors Affecting Nutritional Intake: None identified at this time    Anthropometrics    Temp: 98.5 °F (36.9 °C)  Height Method: Stated  Height: 6' 3" (190.5 cm)  Height (inches): 75 in  Weight Method: Standard Scale  Weight: 73.9 kg (162 lb 14.7 oz)  Weight (lb): 162.92 lb  Ideal Body Weight (IBW), Male: 196 lb  % Ideal Body Weight, Male (lb): 91.45 %  BMI (Calculated): 20.4  BMI Grade: 18.5-24.9 - normal       Lab/Procedures/Meds    Pertinent Labs Reviewed: reviewed  Pertinent Labs " Comments: H/H: 8.0/29.9, MCV 61, MCH 16.3, MCHC 26.8, Na 131, Glu 152, , PAB 12, Tbili 1.9  Pertinent Medications Reviewed: reviewed  Pertinent Medications Comments: Insulin, Coumadin,lasix, folic acid-B6-B12      Estimated/Assessed Needs    Weight Used For Calorie Calculations: 81.3 kg (179 lb 3.7 oz)  Energy Calorie Requirements (kcal): 2176 kcal/d  Energy Need Method: [place]-St Jeor (1.2 PAL)  Protein Requirements: 81 g/d (1 g/kg)  Weight Used For Protein Calculations: 81.3 kg (179 lb 3.7 oz)     Estimated Fluid Requirement Method: other (see comments) (Per MD)  RDA Method (mL): 2176         Nutrition Prescription Ordered    Current Diet Order: 2000 Calorie Diabetic Diet  Nutrition Order Comments: 1500 mL FR    Evaluation of Received Nutrient/Fluid Intake    I/O: -950 mL  Comments: LBM:4/25  Tolerance: tolerating  % Intake of Estimated Energy Needs: 75 - 100 %  % Meal Intake: 75 - 100 %    Nutrition Risk    Level of Risk/Frequency of Follow-up:  (1x/week)       Monitor and Evaluation    Food and Nutrient Intake: food and beverage intake, energy intake  Food and Nutrient Adminstration: diet order  Physical Activity and Function: nutrition-related ADLs and IADLs  Anthropometric Measurements: weight, weight change  Biochemical Data, Medical Tests and Procedures: lipid profile, inflammatory profile, electrolyte and renal panel, gastrointestinal profile, glucose/endocrine profile  Nutrition-Focused Physical Findings: overall appearance       Nutrition Follow-Up    RD Follow-up?: Yes    Lawson Eaton MS, RD, LDN

## 2024-04-26 NOTE — DISCHARGE SUMMARY
[facility] - Cardiology Stepdown  Heart Transplant  Discharge Summary      Patient Name: Kevan Queen  MRN: [id number]  Admission Date: 4/20/2024  Hospital Length of Stay: 6 days  Discharge Date and Time: 04/26/2024 10:36 AM  Attending Physician: Natalya Villatoro MD   Discharging Provider: Chantal Herndon MD  Primary Care Provider: Rosio Armendariz FNP     HPI: Patient is a 39 year old male with stage D 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 He underwent RVAD removal and chest closure 6/30/2022.  He also completed a course of IV Abx for staph epi. He was weaned off  but he had to restarted due to RVF. He was eventually transitioned to milrinone (secondary to  shortage) now on 0.25 mcg/kg/min. He has a history of unclear syncope vs seizures and is followed by neuro for this and is on Keppra.       On 11/15/23 underwent removal of eroded [employer] scICD--no Lifevest (due to LVAD) and no plan to replace ICD as not requiring therapy post LVAD with concern for reinfection.  He has not had neurology f/u with seizure hx on keppra so coordinator to assist him with obtaining appt.  Patient presents for concerns for fever, vomiting and pain and concerns of driveline infection, low INR and hyperglycemia. Beta hydroxybutyrate negative. Lactic acid negative. Normal WBC count and CT Abd/Pelv with contrast without signs of driveline infection. Had elevated blood glucose 03/19/2024 and was advised to go to ED. Also advised to go to ED in the first week of April for fever of 102*F (reported) but did not go at that time. Patient reports that he has been taking lower doses of Lasix at home as compared to what has been prescribed. Exam consistent with hypervolemia. Bedside TTE with  IVC 2.5 cm and non collapsible IVC. No inflow outflow cannula issues visualized.     * No surgery found *     Hospital Course: Upon admission, patient noted to be hyperglycemia w/ glucose>500 and was started on insulin drip and then switched to basal bolus dosing as per Endocrinology once hyperglycemia resolved. Patient was also started on IV lasix for ADHF. Patient throughout the hospital course had difficulty taking lasix due to extreme cramping not related to electrolyte de-arrangement. Discussion was had with patient on whether he would rather switch to hospice care patient stated he would rather live with the discomfort associated w/ lasix. At the time of discharge patient was maintaining euvolemia w/ lasix PO 80mg daily. Pain was controlled w/ muscle relaxer. At the time of discharge patient was clinically and hemodynamically stable w/ no acute complaints. Patient to follow up w/ Endocrinology as outpatient for better management of his DM (A1c >14).     Goals of Care Treatment Preferences:  Code Status: Full Code    Health care agent: [facility] agent number: [phone]          What is most important right now is to focus on quality of life, even if it means sacrificing a little time.  Accordingly, we have decided that the best plan to meet the patient's goals includes continuing with treatment.      Consults (From admission, onward)          Status Ordering Provider     Inpatient consult to Registered Dietitian/Nutritionist  Once        Provider:  (Not yet assigned)    Acknowledged JAYJAY CARVAJAL     Inpatient consult to Palliative Care  Once        Provider:  (Not yet assigned)    Acknowledged JAYJAY CARVAJAL     Inpatient consult to PICC team (NIAS)  Once        Provider:  (Not yet assigned)    Completed GALEN VINCENT     Inpatient consult to Registered Dietitian/Nutritionist  Once        Provider:  (Not yet assigned)    Completed GALEN VINCENT     Inpatient consult to  Endocrinology  Once        Provider:  (Not yet assigned)    Completed GALEN VINCENT     Inpatient consult to Cardiology  Once        Provider:  (Not yet assigned)    Completed NISA HOLLEY Diagnostic Studies: Labs: BMP:   Recent Labs   Lab 04/25/24  0432 04/25/24  1706 04/26/24  0527   *  --  152*   *  --  131*   K 3.5 3.7 3.7   CL 88*  --  95   CO2 28  --  27   BUN 18  --  19   CREATININE 1.4  --  1.2   CALCIUM 8.7  --  9.4   MG 1.8 2.1 2.0       Pending Diagnostic Studies:       Procedure Component Value Units Date/Time    Phosphorus [[id number]] Collected: 04/22/24 0516    Order Status: Sent Lab Status: No result     Specimen: Blood     Phosphorus [[id number]] Collected: 04/20/24 1802    Order Status: Sent Lab Status: No result     Specimen: Blood           Final Active Diagnoses:    Diagnosis Date Noted POA    PRINCIPAL PROBLEM:  Acute on chronic combined systolic and diastolic heart failure [I50.43]  Yes    Type 2 diabetes mellitus with hyperosmolar hyperglycemic state (HHS) [E11.00] 04/20/2024 Yes    Seizure-like activity [R56.9] 07/20/2022 Yes    LVAD (left ventricular assist device) present [Z95.811] 06/30/2022 Not Applicable    HTN (hypertension) [I10] 04/21/2024 Yes    Acute decompensated heart failure [I50.9] 04/21/2024 Yes    Type 2 diabetes mellitus with hyperglycemia, with long-term current use of insulin [E11.65, Z79.4] 04/21/2024 Not Applicable    Chronic anticoagulation [Z79.01] 01/03/2024 Not Applicable    Infected defibrillator now s/p removal Nov 2023 [T82.7XXA] 07/23/2023 Yes    Hyponatremia [E87.1] 07/07/2022 Yes    Familial dilated cardiomyopathy [I42.0]  Yes    CHF (NYHA class IV, ACC/AHA stage D) [I50.84]  Yes    Anemia of chronic disease [D63.8] 10/26/2020 Yes      Problems Resolved During this Admission:    Diagnosis Date Noted Date Resolved POA    Type 2 diabetes mellitus with hyperglycemia [E11.65] 05/25/2022 04/21/2024 Yes      Discharged  "Condition: good    Disposition:     Follow Up:    Patient Instructions:   No discharge procedures on file.  Medications:  Reconciled Home Medications:      Medication List        START taking these medications      BAQSIMI 3 mg/actuation Spry  Generic drug: glucagon  1 each by Nasal route as needed (hypoglycemia).     FREESTYLE LUCIUS 3 SENSOR Dee  Generic drug: blood-glucose sensor  1 Device by Misc.(Non-Drug; Combo Route) route every 14 (fourteen) days.            CHANGE how you take these medications      insulin aspart U-100 100 unit/mL (3 mL) Inpn pen  Commonly known as: NovoLOG  Inject 18 Units into the skin 3 (three) times daily with meals: MAX 94 units/daily  150-200    201-250    251-300    301-350    >350  +2 units   +4 units    +6 units    +8 units    +10 units  What changed: See the new instructions.     insulin detemir U-100 (Levemir) 100 unit/mL (3 mL) Inpn pen  Inject 20 Units into the skin 2 (two) times daily. In the morning and before bed.  What changed:   how much to take  when to take this  additional instructions     pen needle, diabetic 32 gauge x 5/32" Ndle  1 each by Misc.(Non-Drug; Combo Route) route 4 (four) times daily.  What changed: when to take this            ASK your doctor about these medications      acetaminophen 325 MG tablet  Commonly known as: TYLENOL  Take 2 tablets (650 mg total) by mouth every 6 (six) hours as needed for Pain.     aspirin 81 MG EC tablet  Commonly known as: ECOTRIN  Take 1 tablet (81 mg total) by mouth once daily.     atorvastatin 40 MG tablet  Commonly known as: LIPITOR  Take 1 tablet (40 mg total) by mouth once daily.     blood sugar diagnostic Strp  Use to test blood glucose 4 (four) times daily.     cefadroxil 500 MG Cap  Commonly known as: DURICEF  Take 1 capsule (500 mg total) by mouth every 12 (twelve) hours.     furosemide 80 MG tablet  Commonly known as: LASIX  Take 1 tablet (80 mg total) by mouth once daily.     lancets 33 gauge Misc  Use to test " blood glucose 4 (four) times daily.     levETIRAcetam 1000 MG tablet  Commonly known as: KEPPRA  Take 1.5 tablets (1,500 mg total) by mouth 2 (two) times daily.     magnesium oxide 400 mg (241.3 mg magnesium) tablet  Commonly known as: MAG-OX  Take 1 tablet (400 mg total) by mouth once daily.     milrinone 20mg/100ml D5W (200mcg/ml) 20 mg/100 mL (200 mcg/mL) infusion  Commonly known as: PRIMACOR  Inject 23.6 mcg/min into the vein continuous.     mirtazapine 15 MG tablet  Commonly known as: REMERON  Take 1 tablet (15 mg total) by mouth nightly.     ondansetron 4 MG Tbdl  Commonly known as: ZOFRAN-ODT  Take 1 tablet (4 mg total) by mouth every 8 (eight) hours as needed (nausea).     pantoprazole 40 MG tablet  Commonly known as: PROTONIX  Take 1 tablet (40 mg total) by mouth once daily.     polyethylene glycol 17 gram Pwpk  Commonly known as: GLYCOLAX  Take 17 g by mouth once daily.     potassium chloride SA 20 MEQ tablet  Commonly known as: K-DUR,KLOR-CON  Take 20 mEq by mouth 3 (three) times daily. Take 2 tablets by mouth three times daily     sodium chloride 0.9% SolP 50 mL with DAPTOmycin 500 mg SolR 688 mg  Inject 688 mg into the vein once daily.     spironolactone 25 MG tablet  Commonly known as: ALDACTONE  Take 1 tablet (25 mg total) by mouth once daily.     * TRUE METRIX GLUCOSE METER Misc  Generic drug: blood-glucose meter  Use as instructed     * blood-glucose meter Misc  Commonly known as: TRUE METRIX GLUCOSE METER  Use to test blood glucose 4 (four) times daily.     warfarin 3 MG tablet  Commonly known as: COUMADIN  Take 1.5 tablets (4.5mg) orally daily, except 2 tablets (6mg) on Wednesdays and Saturdays           * This list has 2 medication(s) that are the same as other medications prescribed for you. Read the directions carefully, and ask your doctor or other care provider to review them with you.                  Chantal Herndon MD  Heart Transplant  [facility] - Cardiology Stepdown

## 2024-04-26 NOTE — NURSING
Pt's VAD dressing changed per protocol using kit and sterile technique. Pt's drive line site is clean, dry, intact with scant brown drainage at inner layer of old dressing; DLES is + (2). No kinks or frays on drive line, secured with tape per pt's request. Pt tolerated dressing change well. Next dressing change due 04/27/2024.     Pt's PICC line dressing changed per protocol using kit and sterile technique. Pt's PICC line site is clean, dry, intact with no drainage or signs of infection. Pt tolerated dressing change well. Next dressing change due 05/03/2024.

## 2024-04-26 NOTE — HOSPITAL COURSE
Upon admission, patient noted to be hyperglycemia w/ glucose>500 and was started on insulin drip and then switched to basal bolus dosing as per Endocrinology once hyperglycemia resolved. Patient was also started on IV lasix for ADHF. Patient throughout the hospital course had difficulty taking lasix due to extreme cramping not related to electrolyte de-arrangement. Discussion was had with patient on whether he would rather switch to hospice care patient stated he would rather live with the discomfort associated w/ lasix. At the time of discharge patient was maintaining euvolemia w/ lasix PO 80mg daily. Pain was controlled w/ muscle relaxer. At the time of discharge patient was clinically and hemodynamically stable w/ no acute complaints. Patient to follow up w/ Endocrinology as outpatient for better management of his DM (A1c >14).

## 2024-04-26 NOTE — PROGRESS NOTES
[facility] - [facility] Stepdown  Heart Transplant  Progress Note    Patient Name: Kevan Queen  MRN: [id number]  Admission Date: 4/20/2024  Hospital Length of Stay: 6 days  Attending Physician: Natalya Villatoro MD  Primary Care Provider: Rosio Armendariz FNP  Principal Problem:Acute on chronic combined systolic and diastolic heart failure    Subjective:   Interval History: NAEON. No acute complaints. Discharging home today. Urinated 3.3L on PO lasix 80mg dose.     Continuous Infusions:  Current Facility-Administered Medications   Medication Dose Route Frequency Last Rate Last Admin    milrinone 20mg/100ml D5W (200mcg/ml)  0.25 mcg/kg/min Intravenous Continuous 7.1 mL/hr at 04/26/24 0620 0.25 mcg/kg/min at 04/26/24 0620     Scheduled Meds:  Current Facility-Administered Medications   Medication Dose Route Frequency    amLODIPine  5 mg Oral Daily    aspirin  81 mg Oral Daily    atorvastatin  40 mg Oral Daily    folic acid-vit B6-vit B12 2.5-25-2 mg  1 tablet Oral Daily    furosemide  80 mg Oral Daily    gabapentin  300 mg Oral Daily    insulin detemir U-100  20 Units Subcutaneous BID    levETIRAcetam  1,500 mg Oral BID    magnesium oxide  400 mg Oral Daily    mirtazapine  15 mg Oral Nightly    pantoprazole  40 mg Oral Daily    sodium chloride 0.9%  10 mL Intravenous Q6H    spironolactone  25 mg Oral Daily    warfarin  3 mg Oral Once per day on Sunday Friday    warfarin  4.5 mg Oral Once per day on Monday Tuesday Wednesday Thursday Saturday     PRN Meds:  Current Facility-Administered Medications:     acetaminophen, 650 mg, Oral, Q6H PRN    cyclobenzaprine, 5 mg, Oral, TID PRN    dextrose 10%, 12.5 g, Intravenous, PRN    dextrose 10%, 12.5 g, Intravenous, PRN    dextrose 10%, 12.5 g, Intravenous, PRN    dextrose 10%, 12.5 g, Intravenous, PRN    dextrose 10%, 25 g, Intravenous, PRN    dextrose 10%, 25 g, Intravenous, PRN    dextrose 10%, 25 g, Intravenous, PRN    dextrose 10%, 25 g, Intravenous, PRN     ondansetron, 4 mg, Oral, Q8H PRN    Flushing PICC/Midline Protocol, , , Until Discontinued **AND** sodium chloride 0.9%, 10 mL, Intravenous, Q6H **AND** sodium chloride 0.9%, 10 mL, Intravenous, PRN    Review of patient's allergies indicates:   Allergen Reactions    Bumex [bumetanide] Hives    Torsemide Hives     Objective:     Vital Signs (Most Recent):  Temp: 98.8 °F (37.1 °C) (04/26/24 0755)  Pulse: (!) 119 (04/26/24 0755)  Resp: 18 (04/26/24 0755)  BP: 96/72 (04/26/24 0758)  SpO2: 99 % (04/26/24 0755) Vital Signs (24h Range):  Temp:  [96.8 °F (36 °C)-99.2 °F (37.3 °C)] 98.8 °F (37.1 °C)  Pulse:  [114-127] 119  Resp:  [18] 18  SpO2:  [97 %-100 %] 99 %  BP: ()/(0-77) 96/72     Patient Vitals for the past 72 hrs (Last 3 readings):   Weight   04/26/24 0755 73.9 kg (162 lb 14.7 oz)   04/24/24 0453 77.2 kg (170 lb 3.1 oz)     Body mass index is 20.36 kg/m².      Intake/Output Summary (Last 24 hours) at 4/26/2024 1013  Last data filed at 4/26/2024 0133  Gross per 24 hour   Intake 2110 ml   Output 2800 ml   Net -690 ml       Hemodynamic Parameters:       Telemetry: reviewed     Physical Exam  Vitals and nursing note reviewed.   Constitutional:       Appearance: Normal appearance.   HENT:      Head: Normocephalic and atraumatic.      Nose: Nose normal.   Eyes:      Pupils: Pupils are equal, round, and reactive to light.   Neck:      Vascular: JVD present.      Comments: JVP 12cm  Cardiovascular:      Rate and Rhythm: Normal rate and regular rhythm.      Comments: Smooth VAD hum  Pulmonary:      Effort: Pulmonary effort is normal.      Breath sounds: Normal breath sounds.   Abdominal:      General: Abdomen is flat. There is no distension.      Palpations: Abdomen is soft.   Musculoskeletal:         General: Normal range of motion.   Skin:     General: Skin is warm and dry.      Capillary Refill: Capillary refill takes 2 to 3 seconds.   Neurological:      General: No focal deficit present.      Mental Status: He is  alert and oriented to person, place, and time.   Psychiatric:         Mood and Affect: Mood normal.         Behavior: Behavior normal.            Significant Labs:  CBC:  Recent Labs   Lab 04/24/24 0530 04/25/24 0432 04/26/24 0527   WBC 10.23 11.34 13.70*   RBC 4.62 4.91 4.91   HGB 7.8* 8.3* 8.0*   HCT 28.6* 29.8* 29.9*    268 288   MCV 62* 61* 61*   MCH 16.9* 16.9* 16.3*   MCHC 27.3* 27.9* 26.8*     BNP:  Recent Labs   Lab 04/22/24  0516 04/24/24 0530 04/26/24 0527   * 285* 265*     CMP:  Recent Labs   Lab 04/24/24  0530 04/24/24  1656 04/25/24 0432 04/25/24  1706 04/26/24 0527   *  --  510*  --  152*   CALCIUM 8.9  --  8.7  --  9.4   ALBUMIN 2.8*  --  2.9*  --  2.8*   PROT 7.4  --  8.0  --  7.9   *  --  127*  --  131*   K 3.8   < > 3.5 3.7 3.7   CO2 27  --  28  --  27   CL 99  --  88*  --  95   BUN 15  --  18  --  19   CREATININE 1.1  --  1.4  --  1.2   ALKPHOS 186*  --  206*  --  200*   ALT 23  --  23  --  20   AST 42*  --  35  --  33   BILITOT 1.7*  --  1.9*  --  1.9*    < > = values in this interval not displayed.      Coagulation:   Recent Labs   Lab 04/24/24 0530 04/25/24 0432 04/26/24 0527   INR 2.0* 2.2* 2.3*   APTT 33.3* 36.0* 34.0*     LDH:  Recent Labs   Lab 04/24/24 0530 04/25/24 0432 04/26/24 0527   * 344* 270*     Microbiology:  Microbiology Results (last 7 days)       Procedure Component Value Units Date/Time    Blood culture x two cultures. Draw prior to antibiotics. [[id number]] Collected: 04/20/24 0210    Order Status: Completed Specimen: Blood from Peripheral, Hand, Left Updated: 04/25/24 0612     Blood Culture, Routine No growth after 5 days.    Narrative:      Aerobic and anaerobic    Blood culture x two cultures. Draw prior to antibiotics. [[id number]] Collected: 04/20/24 0209    Order Status: Completed Specimen: Blood from Peripheral, Antecubital, Left Updated: 04/25/24 0612     Blood Culture, Routine No growth after 5 days.    Narrative:       Aerobic and anaerobic            I have reviewed all pertinent labs within the past 24 hours.    Estimated Creatinine Clearance: 86.4 mL/min (based on SCr of 1.2 mg/dL).    Diagnostic Results:  I have reviewed all pertinent imaging results/findings within the past 24 hours.  Assessment and Plan:     Patient is a 39 year old male with stage D 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 He underwent RVAD removal and chest closure 6/30/2022.  He also completed a course of IV Abx for staph epi. He was weaned off  but he had to restarted due to RVF. He was eventually transitioned to milrinone (secondary to  shortage) now on 0.25 mcg/kg/min. He has a history of unclear syncope vs seizures and is followed by neuro for this and is on Keppra.       On 11/15/23 underwent removal of eroded [employer] scICD--no Lifevest (due to LVAD) and no plan to replace ICD as not requiring therapy post LVAD with concern for reinfection.  He has not had neurology f/u with seizure hx on keppra so coordinator to assist him with obtaining appt.  Patient presents for concerns for fever, vomiting and pain and concerns of driveline infection, low INR and hyperglycemia. Beta hydroxybutyrate negative. Lactic acid negative. Normal WBC count and CT Abd/Pelv with contrast without signs of driveline infection. Had elevated blood glucose 03/19/2024 and was advised to go to ED. Also advised to go to ED in the first week of April for fever of 102*F (reported) but did not go at that time. Patient reports that he has been taking lower doses of Lasix at home as compared to what has been prescribed. Exam consistent with hypervolemia. Bedside TTE with IVC 2.5 cm and non collapsible IVC. No inflow outflow cannula issues  visualized.     * Acute on chronic combined systolic and diastolic heart failure  -NICM s/p HM 3 on home Milrinone   -Last 2D Echo  9/5/23 : LVEF 10-15%, LVEDD  7.11 cm with speed at 5100  -Last RHC: 10/31/22 with speed at 5100 on Milrinone 0.125 RA: 18, RV: 35/7 (22), PAP: 35/19 (24), PWP: 21, CO: 3.55, CI: 1.6  -Patient near euvolemic. Continue PO lasix 80mg daily.   -GDMT with N/A  -2g Na dietary restriction, 1500 mL fluid restriction, strict I/Os      Type 2 diabetes mellitus with hyperosmolar hyperglycemic state (HHS)  -Resolved.  -management per endocrine      Seizure-like activity  -restarted home Keppra dose 1500 BID  -no seizure activity this hospital stay    LVAD (left ventricular assist device) present  -HeartMate 3 Implanted on 6/29/2022 as DT with RV failure on milrinone at 0.25 mcg/kg/min.  -Continue Coumadin; INR goal 2.0-3.0,  INR is therapeutic today   -Antiplatelets: ASA 81  -LDH is stable.  Will continue to monitor daily  -Speed set at 5100, LSL 4700 rpm  -Echo: 9/5/23 EF: 10-15%, LVEDD: 7.11cm.        Procedure: Device Interrogation Including analysis of device parameters  Current Settings: Ventricular Assist Device  Review of device function is stable        4/26/2024     8:00 AM 4/26/2024     5:39 AM 4/26/2024     1:24 AM 4/25/2024     8:00 PM 4/25/2024     4:18 PM 4/25/2024    12:27 PM 4/25/2024     7:50 AM   TXP LVAD INTERROGATIONS   Type HeartMate3  HeartMate3 HeartMate3 HeartMate3 HeartMate3 HeartMate3   Flow 4.4 4.3 4.1 4.1 4.1 4.3 4.3   Speed 5100 5100 5100 5100 5100 5100 5100   PI 4.8 5 5.6 5.1 5.4 4.8 5.1   Power (Serna) 3.7 3.6 3.6 3.6 3.6 3.1 3.7   LSL 4700    4700 4700 4700   Pulsatility Pulse Intermittent pulse Intermittent pulse Intermittent pulse Intermittent pulse Intermittent pulse Intermittent pulse         HTN (hypertension)  -Goal MAP 60-90  -Was consistently above 90 yesterday and Norvasc started     Chronic anticoagulation  -INR 1.9 on admit  -Continue warfarin    Infected  defibrillator now s/p removal Nov 2023  Infected [employer] scICD that was extracted 11/2023  No LifeVest due to VAD present and no hx of VT shocks.    CHF (NYHA class IV, ACC/AHA stage D)  -NICM  -see above     Anemia of chronic disease  -H/H stable        Chantal Herndon MD  Heart Transplant  [facility] - Cardiology Stepdown

## 2024-04-26 NOTE — PROGRESS NOTES
[facility] - [facility] Stepdown  Endocrinology  Progress Note    Admit Date: 4/20/2024     Reason for Consult: Management of T2DM, Hyperglycemia      Surgical Procedure and Date: LVAD 06/29/2022     Diabetes diagnosis year: 2022     Home Diabetes Medications:   -Levemir 30 units QD   -Novolog 20 units TIDWM in addition to the following correction scale:    150 - 200 + 2 unit    201 - 250 + 4 units    251 - 300 + 6 units    301 - 350 + 8 units       > 350   + 10 units     How often checking glucose at home?  > 4 times per day  BG readings on regimen: 180's-200's  Hypoglycemia on the regimen?  No  Missed doses on regimen?  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 He underwent RVAD removal and chest closure 6/30/2022.  He also completed a course of IV Abx for staph epi. He was weaned off  but he had to restarted due to RVF. He was eventually transitioned to milrinone (secondary to  shortage) now on 0.25 mcg/kg/min. He has a history of unclear syncope vs seizures and is followed by neuro for this and is on Keppra.   On 11/15/23 underwent removal of eroded [employer] scICD--no Lifevest (due to LVAD) and no plan to replace ICD as not requiring therapy post LVAD with concern for reinfection.  He has not had neurology f/u with seizure hx on keppra so coordinator to assist him with obtaining appt.  Patient presents for concerns for fever, vomiting and pain and concerns of driveline infection, low INR and hyperglycemia. Beta hydroxybutyrate  "negative. Lactic acid negative. Normal WBC count and CT Abd/Pelv with contrast without signs of driveline infection. Had elevated blood glucose 2024 and was advised to go to ED. Also advised to go to ED in the first week of April for fever of 102*F (reported) but did not go at that time. Patient reports that he has been taking lower doses of Lasix at home as compared to what has been prescribed. Exam consistent with hypervolemia. Bedside TTE with IVC 2.5 cm and non collapsible IVC. No inflow outflow cannula issues visualized. Endocrine consulted to manage hyperglycemia and type 2 diabetes.     Lab Results   Component Value Date    HGBA1C >14.0 (H) 2024         Interval HPI:   No acute events overnight. Patient in room 315/315 A. Blood glucose variable. BG at and above goal on current insulin regimen (Transition Insulin Drip). Steroid use-  none .      Renal function- Normal   Vasopressors-  None     Diet diabetic 2000 Calorie; Fluid - 1500mL     Eatin%  Nausea: No  Hypoglycemia and intervention: No  Fever: No  TPN and/or TF: No    BP 96/72 (BP Location: Right arm, Patient Position: Lying)   Pulse (!) 119   Temp 98.8 °F (37.1 °C) (Oral)   Resp 18   Ht 6' 3" (1.905 m)   Wt 77.2 kg (170 lb 3.1 oz)   SpO2 99%   BMI 21.27 kg/m²     Labs Reviewed and Include    Recent Labs   Lab 24  0527   *   CALCIUM 9.4   ALBUMIN 2.8*   PROT 7.9   *   K 3.7   CO2 27   CL 95   BUN 19   CREATININE 1.2   ALKPHOS 200*   ALT 20   AST 33   BILITOT 1.9*     Lab Results   Component Value Date    WBC 13.70 (H) 2024    HGB 8.0 (L) 2024    HCT 29.9 (L) 2024    MCV 61 (L) 2024     2024     No results for input(s): "TSH", "FREET4" in the last 168 hours.  Lab Results   Component Value Date    HGBA1C >14.0 (H) 2024       Nutritional status:   Body mass index is 21.27 kg/m².  Lab Results   Component Value Date    ALBUMIN 2.8 (L) 2024    ALBUMIN 2.9 (L) " 04/25/2024    ALBUMIN 2.8 (L) 04/24/2024     Lab Results   Component Value Date    PREALBUMIN 12 (L) 04/26/2024    PREALBUMIN 13 (L) 04/24/2024    PREALBUMIN 9 (L) 04/22/2024       Estimated Creatinine Clearance: 90.2 mL/min (based on SCr of 1.2 mg/dL).    Accu-Checks  Recent Labs     04/25/24  1810 04/25/24  1838 04/25/24  1854 04/25/24  1856 04/25/24 2004 04/25/24 2053 04/25/24  2134 04/25/24  2356 04/26/24  0120 04/26/24  0531   POCTGLUCOSE 89 89 174* 83 182* 258* 297* 302* 183* 138*       Current Medications and/or Treatments Impacting Glycemic Control  Immunotherapy:    Immunosuppressants       None          Steroids:   Hormones (From admission, onward)      None          Pressors:    Autonomic Drugs (From admission, onward)      None          Hyperglycemia/Diabetes Medications:   Antihyperglycemics (From admission, onward)      Start     Stop Route Frequency Ordered    04/26/24 0900  insulin aspart U-100 pen 0-10 Units         -- SubQ Before meals, nightly and at 0200 PRN 04/26/24 0900    04/25/24 2030  insulin aspart U-100 pen 18 Units         -- SubQ 3 times daily with meals 04/25/24 2026 04/25/24 2023  insulin regular in 0.9 % NaCl 100 unit/100 mL (1 unit/mL) infusion        Question:  Enter initial dose (Units/hr):  Answer:  2    -- IV Continuous 04/25/24 2024 04/20/24 2100  insulin regular in 0.9 % NaCl 100 unit/100 mL (1 unit/mL) infusion        Question:  Enter initial dose (Units/hr):  Answer:  1.7    04/22/24 2100 IV Continuous 04/20/24 1947            ASSESSMENT and PLAN    Cardiac/Vascular  * Acute on chronic combined systolic and diastolic heart failure  Managed per primary team  Avoid hypoglycemia        LVAD (left ventricular assist device) present  Managed per primary team  Avoid hypoglycemia        Endocrine  Type 2 diabetes mellitus with hyperglycemia, with long-term current use of insulin  BG goal: 140-180    - IIP d/c'd overnight, transition insulin gtt @ 2.0 u/hr with stepdown  parameters initiated. Notified by team of plans to d/c pt today- given this will transition to SQ basal regimen of 20 units levemir BID (equivalent to gtt rate in which BG stable at goal)   - Novolog 18 units TIDWM   - [facility] SSI (150/25) prn ac/hs/0200  - POCT Glucose before meals, at bedtime and at 2 am  - Hypoglycemia protocol in place      ** Please notify Endocrine for any change and/or advance in diet**  ** Please call Endocrine for any BG related issues **     Discharge Planning:   - on home milrinone   -Discharge Planning:   - Levemir 20 units BID  (Eat a snack before bed if BG is < 100 mg/dl)  - Novolog 18 units TIDWM (Hold if BG < 100 mg/dL)  - Novolog SSI for BG excursions:  Add correction scale if needed.  Blood sugar 150 to 200 add 2 units  Blood sugar 201 to 250 add 4 units  Blood sugar 251 to 300 add 6 units  Blood sugar 301 to 350 add 8 units  Blood sugar greater than 350 add 10 units  - Insurance approved diabetes testing supplies to check blood sugar 4 times a day (Before meals and at bedtime) and as needed.  - BD pen needles   - Insurance approved glucagon for extreme hypoglycemia (Baqsimi or Zegalogue if insurance approved)  - Send BG logs in 4 days  - Follow-up in discharge clinic in 2 weeks.     Education:  Discharge Teaching:    Reviewed topics related to DM including: the need for insulin, how insulin works, what makes it a high risk medication, the importance of immediate follow up with either PCP or endocrine provider, importance of and how to check BG, how to record BG on logs, how to administer insulin, appropriate insulin administration sites, importance of rotating injection sites, hyper/hypoglycemia, how and when to treat hypoglycemia, when to hold insulin, how the correction scale works, importance of storing unused insulin in the refrigerator, and when to seek medical attention.  Patient verbalized understanding, answered all questions to patient's satisfaction. Blood sugar logs given  to patient.     Hypoglycemia (Low Blood Sugar)  Too little glucose (sugar) in your blood is called hypoglycemia or low blood sugar. Diabetes itself doesnt cause low blood sugar. But some of the treatments for diabetes, such as pills or insulin, may increase your risk for it. Low blood sugar may cause you to lose consciousness or have a seizure. So always treat low blood sugar right away.    Special note: Always carry a source of fast-acting sugar and a snack in case of hypoglycemia     What You May Notice  If you have low blood sugar, you may have these symptoms:  Shakiness or dizziness  Cold, clammy skin or sweating  Feelings of hunger  Headache  Nervousness  A hard, fast heartbeat  Weakness  Confusion or irritability  Blurred vision  What You Should Do  First, check your blood sugar. If it is too low (out of your target range), eat or drink 15 to 20 grams of fast-acting sugar. This may be 3 to 4 glucose tablets, 4 oz (half a cup) fruit juice or regular (non-diet) soda, 8 oz (one cup ) fat-free milk, or 1 tablespoon of honey. Dont take more than this, or your blood sugar may go too high.  Wait 15 minutes. Then recheck your blood sugar if you can.  If your blood sugar is still too low, repeat the steps above and check your blood sugar again. If your blood sugar still has not returned to your target range, contact your healthcare provider or seek emergency care.  Once your blood sugar returns to target range, eat a snack or meal.  Preventing Low Blood Sugar  Eat your meals and snacks at the same times each day. Dont skip meals!  Ask your healthcare provider if it is safe for you to drink alcohol. Never drink on an empty stomach.  Take your medication at the prescribed times.  Always carry a source of fast-acting sugar and a snack when youre away from home.  Other Things to Do  Carry a medical ID card or wear a medical alert bracelet or necklace. It should say that you have diabetes. It should also say what to do  if you pass out or have a seizure.  Make sure your family, friends, and coworkers know the signs of low blood sugar. Tell them what to do if your blood sugar falls very low and you cant treat yourself.  Keep a glucagon emergency kit handy. Be sure your family, friends, and coworkers know how and when to use it. Check it regularly and replace the glucagon before it expires.  Talk to your healthcare team about other things you can do to prevent low blood sugar.    If you experience hypoglycemia several times, call your doctor.   © 7189-4135 [facility], [address] [place] [address], [place] [address]. All rights reserved. This information is not intended as a substitute for professional medical care. Always follow your healthcare professional's instructions.                   Susanna Doty PA-C  Endocrinology  [facility] - Cardiology Stepdown

## 2024-04-26 NOTE — CONSULTS
[facility] - Cardiology Stepdown  Palliative Medicine  Consult Note    Patient Name: Kevan Queen  MRN: [id number]  Admission Date: 4/20/2024  Hospital Length of Stay: 6 days  Code Status: Full Code   Attending Provider: Natalya Villatoro MD  Consulting Provider: DENZEL Mclean  Primary Care Physician: Rosio Armendariz FNP  Principal Problem:Acute on chronic combined systolic and diastolic heart failure    Patient information was obtained from patient and primary team.      Inpatient consult to Palliative Care  Consult performed by: Samia Bell CNS  Consult ordered by: Chantal Herndon MD        Assessment/Plan:     Palliative Care  Palliative care encounter  Impression: Pt is a 40 y/o male with advanced heart failure. Pt has LVAD in place. Pt admitted for  on chronic combined systolic and diastolic heart failure. Pt is alert, oriented to person, place, time, and situation.     Reason for consult: ACP. Per MD, pt not taking lasix due to cramping.       Goals of care/ACP:  Met with pt who is aware of his medicla issues. Per pt he does not take his lasix all the time due to sever cramping that can last hours. Per pt, this affects his QOL. Pt reports he is aware he needs to take the medication. Pt reports muscle relaxer has helped and is agreeable to take meds.   Pt open to following up with Dr. Choe in [facility] clincic for continued ACP/symptom management.     Pt reports he still lives with FLORIN Carlson and two of his children.     Code status: Full.   MPOA: FLORIN, Robyn.     Symptom management:     Cramping from lasix:   Current meds:   Pt on cyclobenzaprine tid prn muscle cramps.   Pt reports helps with cramping.     VAD (left ventricular assist device) present  -HeartMate 3 Implanted on 6/29/2022 as DT with RV failure on milrinone at 0.25 mcg/kg/min.    Pt denies pain, N/V, anxiety, constipation.     Plan:   Will schedule f/u in clinic.              Thank you for your consult. I will follow-up  with patient. Please contact us if you have any additional questions.    Subjective:     HPI:   Patient is a 39 year old male with stage D 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 He underwent RVAD removal and chest closure 6/30/2022.  He also completed a course of IV Abx for staph epi. He was weaned off  but he had to restarted due to RVF. He was eventually transitioned to milrinone (secondary to  shortage) now on 0.25 mcg/kg/min. He has a history of unclear syncope vs seizures and is followed by neuro for this and is on Keppra.       On 11/15/23 underwent removal of eroded [employer] scICD--no Lifevest (due to LVAD) and no plan to replace ICD as not requiring therapy post LVAD with concern for reinfection.  He has not had neurology f/u with seizure hx on keppra so coordinator to assist him with obtaining appt.  Patient presents for concerns for fever, vomiting and pain and concerns of driveline infection, low INR and hyperglycemia. Beta hydroxybutyrate negative. Lactic acid negative. Normal WBC count and CT Abd/Pelv with contrast without signs of driveline infection. Had elevated blood glucose 03/19/2024 and was advised to go to ED. Also advised to go to ED in the first week of April for fever of 102*F (reported) but did not go at that time. Patient reports that he has been taking lower doses of Lasix at home as compared to what has been prescribed. Exam consistent with hypervolemia. Bedside TTE with IVC 2.5 cm and non collapsible IVC. No inflow outflow cannula issues visualized.     Pt to f/u in [facility] care [facility] telemedicine.     Hospital Course:  No notes on file    Interval History: LVAD in place.     Past Medical History:   Diagnosis Date    Acute respiratory failure  with hypoxia 07/22/2023    Arthritis     Awareness alteration, transient 09/01/2022    Cardiomyopathy     CHF (congestive heart failure) 10/01/2020    COVID-19 06/03/2023    Diabetes mellitus     Dilated cardiomyopathy 10/26/2020    Drug abuse 10/2020    Headache 04/19/2023    Hyperglycemia 12/16/2022    Hyperosmolar hyperglycemic state (HHS) 05/25/2022    ICD (implantable cardioverter-defibrillator) in place 10/26/2020    Infected defibrillator now s/p removal Nov 2023 07/23/2023    Left ventricular assist device (LVAD) complication, initial encounter 06/05/2023    Muscle cramping 06/15/2022    Renal disorder     SOB (shortness of breath) 06/13/2022    Tingling in extremities 07/13/2022       Past Surgical History:   Procedure Laterality Date    APPLICATION OF WOUND VACUUM-ASSISTED CLOSURE DEVICE N/A 6/30/2022    Procedure: APPLICATION, WOUND VAC;  Surgeon: Luis F Paige MD;  Location: [facility] OR [address] [place] [address];  Service: Cardiovascular;  Laterality: N/A;  50 x 5 cm    CARDIAC DEFIBRILLATOR PLACEMENT      ECHOCARDIOGRAM,TRANSESOPHAGEAL  11/9/2023    Procedure: Transesophageal echo (GUILLERMO) intra-procedure log documentation;  Surgeon: Eron Ivy MD;  Location: [facility] EP LAB;  Service: Cardiology;;    EXTRACTION, ELECTRODE LEAD Left 11/9/2023    Procedure: EXTRACTION, ELECTRODE LEAD;  Surgeon: ADALID Leon MD;  Location: [facility] EP LAB;  Service: Cardiology;  Laterality: Left;  INFECTION, LEAD EXTRACTION, GUILLERMO, BSCI, ANES, EH,   *NO CTS BACKUP*    IMPLANTATION OF RIGHT VENTRICULAR ASSIST DEVICE (RVAD) N/A 6/29/2022    Procedure: INSERTION, RVAD;  Surgeon: Luis F Paige MD;  Location: [facility] OR [address] [place] [address];  Service: Cardiovascular;  Laterality: N/A;    INSERTION OF GRAFT TO PERICARDIUM Right 6/30/2022    Procedure: INSERTION-RIGHT VENTRICULAR ASSIST DEVICE;  Surgeon: LuisF Paige MD;  Location: [facility] OR [address] [place] [address];  Service: Cardiovascular;  Laterality: Right;    IRRIGATION OF MEDIASTINUM  6/30/2022    Procedure:  IRRIGATION, MEDIASTINUM;  Surgeon: Luis F Paige MD;  [address];  Service: Cardiovascular;;    LEFT VENTRICULAR ASSIST DEVICE Left 6/23/2022    Procedure: INSERTION-LEFT VENTRICULAR ASSIST DEVICE;  Surgeon: Luis F Paige MD;  Location: [facility] OR [facility] FLR;  Service: Cardiovascular;  Laterality: Left;    LEFT VENTRICULAR ASSIST DEVICE N/A 6/29/2022    Procedure: INSERTION-LEFT VENTRICULAR ASSIST DEVICE;  Surgeon: Luis F Paige MD;  Location: [facility] OR [facility] FLR;  Service: Cardiovascular;  Laterality: N/A;    REVISION OF SKIN POCKET FOR CARDIOVERTER-DEFIBRILLATOR  11/9/2023    Procedure: REVISION, SKIN POCKET, FOR CARDIOVERTER-DEFIBRILLATOR;  Surgeon: ADALID Leon MD;  Location: [facility] EP LAB;  Service: Cardiology;;    RIGHT HEART CATHETERIZATION Right 4/8/2022    Procedure: INSERTION, CATHETER, RIGHT HEART;  Surgeon: Luca Lopez Jr., MD;  Location: [facility] CATH LAB;  Service: Cardiology;  Laterality: Right;    RIGHT HEART CATHETERIZATION Right 4/19/2022    Procedure: INSERTION, CATHETER, RIGHT HEART;  Surgeon: Josh Pulido MD;  Location: [facility] CATH LAB;  Service: Cardiology;  Laterality: Right;    RIGHT HEART CATHETERIZATION Right 7/21/2022    Procedure: INSERTION, CATHETER, RIGHT HEART;  Surgeon: Dalia Crum MD;  Location: [facility] CATH LAB;  Service: Cardiology;  Laterality: Right;    RIGHT HEART CATHETERIZATION Right 10/31/2022    Procedure: INSERTION, CATHETER, RIGHT HEART;  Surgeon: Dalia Crum MD;  Location: [facility] CATH LAB;  Service: Cardiology;  Laterality: Right;    STERNAL WOUND CLOSURE N/A 6/30/2022    Procedure: CLOSURE, WOUND, STERNUM;  Surgeon: Luis F Paige MD;  [facility];  Service: Cardiovascular;  Laterality: N/A;       Review of patient's allergies indicates:   Allergen Reactions    Bumex [bumetanide] Hives    Torsemide Hives       Medications:  Continuous Infusions:  Current Facility-Administered Medications   Medication Dose Route Frequency Last  Rate Last Admin    milrinone 20mg/100ml D5W (200mcg/ml)  0.25 mcg/kg/min Intravenous Continuous 7.1 mL/hr at 04/26/24 0620 0.25 mcg/kg/min at 04/26/24 0620     Scheduled Meds:  Current Facility-Administered Medications   Medication Dose Route Frequency    amLODIPine  5 mg Oral Daily    aspirin  81 mg Oral Daily    atorvastatin  40 mg Oral Daily    folic acid-vit B6-vit B12 2.5-25-2 mg  1 tablet Oral Daily    furosemide  80 mg Oral Daily    gabapentin  300 mg Oral Daily    insulin detemir U-100  20 Units Subcutaneous BID    levETIRAcetam  1,500 mg Oral BID    magnesium oxide  400 mg Oral Daily    mirtazapine  15 mg Oral Nightly    pantoprazole  40 mg Oral Daily    sodium chloride 0.9%  10 mL Intravenous Q6H    spironolactone  25 mg Oral Daily    warfarin  3 mg Oral Once per day on Sunday Friday    warfarin  4.5 mg Oral Once per day on Monday Tuesday Wednesday Thursday Saturday     PRN Meds:  Current Facility-Administered Medications:     acetaminophen, 650 mg, Oral, Q6H PRN    cyclobenzaprine, 5 mg, Oral, TID PRN    dextrose 10%, 12.5 g, Intravenous, PRN    dextrose 10%, 12.5 g, Intravenous, PRN    dextrose 10%, 12.5 g, Intravenous, PRN    dextrose 10%, 12.5 g, Intravenous, PRN    dextrose 10%, 25 g, Intravenous, PRN    dextrose 10%, 25 g, Intravenous, PRN    dextrose 10%, 25 g, Intravenous, PRN    dextrose 10%, 25 g, Intravenous, PRN    ondansetron, 4 mg, Oral, Q8H PRN    Flushing PICC/Midline Protocol, , , Until Discontinued **AND** sodium chloride 0.9%, 10 mL, Intravenous, Q6H **AND** sodium chloride 0.9%, 10 mL, Intravenous, PRN    Family History       Problem Relation (Age of Onset)    Diverticulosis Brother    Heart attack Maternal Grandmother, Maternal Grandfather    Heart failure Father          Tobacco Use    Smoking status: Former     Current packs/day: 0.00     Average packs/day: 0.5 packs/day for 16.6 years (8.3 ttl pk-yrs)     Types: Cigarettes     Start date: 10/1/2004     Quit date: 4/23/2021     Years  since quitting: 3.0    Smokeless tobacco: Never   Substance and Sexual Activity    Alcohol use: Not Currently    Drug use: Not Currently     Types: Marijuana, MDMA (Ecstacy)    Sexual activity: Yes     Partners: Female     Birth control/protection: None       Review of Systems   Constitutional:  Positive for fatigue.   Respiratory:  Positive for shortness of breath.    Musculoskeletal:         Cramping   Neurological:  Positive for weakness.     Objective:     Vital Signs (Most Recent):  Temp: 98.8 °F (37.1 °C) (04/26/24 0755)  Pulse: (!) 117 (04/26/24 1019)  Resp: 18 (04/26/24 0755)  BP: 96/72 (04/26/24 0758)  SpO2: 99 % (04/26/24 0755) Vital Signs (24h Range):  Temp:  [96.8 °F (36 °C)-99.2 °F (37.3 °C)] 98.8 °F (37.1 °C)  Pulse:  [114-127] 117  Resp:  [18] 18  SpO2:  [97 %-100 %] 99 %  BP: ()/(0-77) 96/72     Weight: 73.9 kg (162 lb 14.7 oz)  Body mass index is 20.36 kg/m².       Physical Exam  Constitutional:       General: He is awake. He is not in acute distress.  HENT:      Head: Normocephalic and atraumatic.   Eyes:      General: Lids are normal.      Conjunctiva/sclera: Conjunctivae normal.   Cardiovascular:      Comments: LVAD in place.   Musculoskeletal:      Comments: Normal ROM   Neurological:      Mental Status: He is alert and oriented to person, place, and time.   Psychiatric:         Behavior: Behavior is cooperative.            Review of Symptoms      Symptom Assessment (ESAS 0-10 Scale)  Pain:  0  Dyspnea:  0  Anxiety:  0  Nausea:  0  Depression:  0  Anorexia:  0  Fatigue:  0  Insomnia:  0  Restlessness:  0  Agitation:  0         Performance Status:  60    Living Arrangements:  Lives with family    Psychosocial/Cultural:   See Palliative Psychosocial Note: No  Pt lives with Robyn CATHERINE and 2 of his children love with him.   **Primary  to Follow**  Palliative Care  Consult: Yes        Advance Care Planning  Advance Directives:   Medical Power of : Yes    Agent's  Name:  Prema [facility]    Decision Making:  Patient answered questions  Goals of Care: What is most important right now is to focus on quality of life, even if it means sacrificing a little time. Accordingly, we have decided that the best plan to meet the patient's goals includes continuing with treatment.         Significant Labs: All pertinent labs within the past 24 hours have been reviewed.  CBC:   Recent Labs   Lab 04/26/24 0527   WBC 13.70*   HGB 8.0*   HCT 29.9*   MCV 61*        BMP:  Recent Labs   Lab 04/26/24 0527   *   *   K 3.7   CL 95   CO2 27   BUN 19   CREATININE 1.2   CALCIUM 9.4   MG 2.0     LFT:  Lab Results   Component Value Date    AST 33 04/26/2024    ALKPHOS 200 (H) 04/26/2024    BILITOT 1.9 (H) 04/26/2024     Albumin:   Albumin   Date Value Ref Range Status   04/26/2024 2.8 (L) 3.5 - 5.2 g/dL Final     Protein:   Total Protein   Date Value Ref Range Status   04/26/2024 7.9 6.0 - 8.4 g/dL Final     Lactic acid:   Lab Results   Component Value Date    LACTATE 1.8 07/22/2023    LACTATE 2.2 06/03/2023       Significant Imaging: I have reviewed all pertinent imaging results/findings within the past 24 hours.      20 minutes of ACP completed.       Samia Bell, CNS  Palliative Medicine  [facility] - Cardiology Stepdown

## 2024-04-26 NOTE — PLAN OF CARE
Advance Care Planning   [facility] - Cardiology Stepdown  Palliative Care       Patient Name: Kevan Queen  MRN: [id number]  Admission Date: 4/20/2024  Hospital Length of Stay: 6 days  Code Status: Full Code   Attending Provider: Natalya Villatoro MD  Palliative Care Provider:   Primary Care Physician: Rosio Armendariz FNP  Principal Problem:Acute on chronic combined systolic and diastolic heart failure     LCSW, along with DANIA NGUYỄN, met with pt at bedside. Pt AAOx3, in fair spirits. Pt expresses that he wants to take the Lasix as prescribed to prolong his life, however, when he does, he endures several hours of painful leg cramps. Pt endorses that the Flexeril and Robaxin are helping with the leg cramps, and he has not experienced any while taking them. Pt aware of what might happen should he not take the Lasix. Pt states that so long as his symptoms are managed and he feels his QOL is be preserved, he is fully committed to med compliance. Pt denies questions or concerns at this time.     Debra Asif, [facility]  Palliative Medicine

## 2024-04-26 NOTE — NURSING
Notified Lalitha.PA , and pt didn't eat any before sugar check and lunch just delivered, scheduled insulin dose increased to 20 units,  and it's given and  sliding scale given.

## 2024-04-26 NOTE — SUBJECTIVE & OBJECTIVE
"Interval HPI:   No acute events overnight. Patient in room 315/315 A. Blood glucose variable. BG at and above goal on current insulin regimen (Transition Insulin Drip). Steroid use-  none .      Renal function- Normal   Vasopressors-  None     Diet diabetic 2000 Calorie; Fluid - 1500mL     Eatin%  Nausea: No  Hypoglycemia and intervention: No  Fever: No  TPN and/or TF: No    BP 96/72 (BP Location: Right arm, Patient Position: Lying)   Pulse (!) 119   Temp 98.8 °F (37.1 °C) (Oral)   Resp 18   Ht 6' 3" (1.905 m)   Wt 77.2 kg (170 lb 3.1 oz)   SpO2 99%   BMI 21.27 kg/m²     Labs Reviewed and Include    Recent Labs   Lab 24  0527   *   CALCIUM 9.4   ALBUMIN 2.8*   PROT 7.9   *   K 3.7   CO2 27   CL 95   BUN 19   CREATININE 1.2   ALKPHOS 200*   ALT 20   AST 33   BILITOT 1.9*     Lab Results   Component Value Date    WBC 13.70 (H) 2024    HGB 8.0 (L) 2024    HCT 29.9 (L) 2024    MCV 61 (L) 2024     2024     No results for input(s): "TSH", "FREET4" in the last 168 hours.  Lab Results   Component Value Date    HGBA1C >14.0 (H) 2024       Nutritional status:   Body mass index is 21.27 kg/m².  Lab Results   Component Value Date    ALBUMIN 2.8 (L) 2024    ALBUMIN 2.9 (L) 2024    ALBUMIN 2.8 (L) 2024     Lab Results   Component Value Date    PREALBUMIN 12 (L) 2024    PREALBUMIN 13 (L) 2024    PREALBUMIN 9 (L) 2024       Estimated Creatinine Clearance: 90.2 mL/min (based on SCr of 1.2 mg/dL).    Accu-Checks  Recent Labs     24  1810 24  1838 24  1854 24  1856 24  2356 24  0120 24  0531   POCTGLUCOSE 89 89 174* 83 182* 258* 297* 302* 183* 138*       Current Medications and/or Treatments Impacting Glycemic Control  Immunotherapy:    Immunosuppressants       None          Steroids:   Hormones (From admission, onward)      None      "     Pressors:    Autonomic Drugs (From admission, onward)      None          Hyperglycemia/Diabetes Medications:   Antihyperglycemics (From admission, onward)      Start     Stop Route Frequency Ordered    04/26/24 0900  insulin aspart U-100 pen 0-10 Units         -- SubQ Before meals, nightly and at 0200 PRN 04/26/24 0900    04/25/24 2030  insulin aspart U-100 pen 18 Units         -- SubQ 3 times daily with meals 04/25/24 2026 04/25/24 2023  insulin regular in 0.9 % NaCl 100 unit/100 mL (1 unit/mL) infusion        Question:  Enter initial dose (Units/hr):  Answer:  2    -- IV Continuous 04/25/24 2024 04/20/24 2100  insulin regular in 0.9 % NaCl 100 unit/100 mL (1 unit/mL) infusion        Question:  Enter initial dose (Units/hr):  Answer:  1.7    04/22/24 2100 IV Continuous 04/20/24 1947

## 2024-04-26 NOTE — SUBJECTIVE & OBJECTIVE
Interval History: LVAD in place.     Past Medical History:   Diagnosis Date    Acute respiratory failure with hypoxia 07/22/2023    Arthritis     Awareness alteration, transient 09/01/2022    Cardiomyopathy     CHF (congestive heart failure) 10/01/2020    COVID-19 06/03/2023    Diabetes mellitus     Dilated cardiomyopathy 10/26/2020    Drug abuse 10/2020    Headache 04/19/2023    Hyperglycemia 12/16/2022    Hyperosmolar hyperglycemic state (HHS) 05/25/2022    ICD (implantable cardioverter-defibrillator) in place 10/26/2020    Infected defibrillator now s/p removal Nov 2023 07/23/2023    Left ventricular assist device (LVAD) complication, initial encounter 06/05/2023    Muscle cramping 06/15/2022    Renal disorder     SOB (shortness of breath) 06/13/2022    Tingling in extremities 07/13/2022       Past Surgical History:   Procedure Laterality Date    APPLICATION OF WOUND VACUUM-ASSISTED CLOSURE DEVICE N/A 6/30/2022    Procedure: APPLICATION, WOUND VAC;  Surgeon: Luis F Paige MD;  Location: [facility] OR [address] [place] [address];  Service: Cardiovascular;  Laterality: N/A;  50 x 5 cm    CARDIAC DEFIBRILLATOR PLACEMENT      ECHOCARDIOGRAM,TRANSESOPHAGEAL  11/9/2023    Procedure: Transesophageal echo (GUILLERMO) intra-procedure log documentation;  Surgeon: Eron Ivy MD;  Location: [facility] EP LAB;  Service: Cardiology;;    EXTRACTION, ELECTRODE LEAD Left 11/9/2023    Procedure: EXTRACTION, ELECTRODE LEAD;  Surgeon: ADALID Leon MD;  Location: [facility] EP LAB;  Service: Cardiology;  Laterality: Left;  INFECTION, LEAD EXTRACTION, GUILLERMO, BSCI, ANES, EH,   *NO CTS BACKUP*    IMPLANTATION OF RIGHT VENTRICULAR ASSIST DEVICE (RVAD) N/A 6/29/2022    Procedure: INSERTION, RVAD;  Surgeon: Luis F Paige MD;  Location: [facility] OR [address] [place] [address];  Service: Cardiovascular;  Laterality: N/A;    INSERTION OF GRAFT TO PERICARDIUM Right 6/30/2022    Procedure: INSERTION-RIGHT VENTRICULAR ASSIST DEVICE;  Surgeon: Luis F Paige MD;  Location: [facility] OR [address] [place] [address];   Service: Cardiovascular;  Laterality: Right;    IRRIGATION OF MEDIASTINUM  6/30/2022    Procedure: IRRIGATION, MEDIASTINUM;  Surgeon: Luis F Paige MD;  [address]  Service: Cardiovascular;;    LEFT VENTRICULAR ASSIST DEVICE Left 6/23/2022    Procedure: INSERTION-LEFT VENTRICULAR ASSIST DEVICE;  Surgeon: Luis F Paige MD;  [facility];  Service: Cardiovascular;  Laterality: Left;    LEFT VENTRICULAR ASSIST DEVICE N/A 6/29/2022    Procedure: INSERTION-LEFT VENTRICULAR ASSIST DEVICE;  Surgeon: Luis F Paige MD;  [facility];  Service: Cardiovascular;  Laterality: N/A;    REVISION OF SKIN POCKET FOR CARDIOVERTER-DEFIBRILLATOR  11/9/2023    Procedure: REVISION, SKIN POCKET, FOR CARDIOVERTER-DEFIBRILLATOR;  Surgeon: ADALID Leon MD;  Location: [facility] EP LAB;  Service: Cardiology;;    RIGHT HEART CATHETERIZATION Right 4/8/2022    Procedure: INSERTION, CATHETER, RIGHT HEART;  Surgeon: Luca Lopez Jr., MD;  Location: [facility] CATH LAB;  Service: Cardiology;  Laterality: Right;    RIGHT HEART CATHETERIZATION Right 4/19/2022    Procedure: INSERTION, CATHETER, RIGHT HEART;  Surgeon: Josh Pulido MD;  Location: [facility] CATH LAB;  Service: Cardiology;  Laterality: Right;    RIGHT HEART CATHETERIZATION Right 7/21/2022    Procedure: INSERTION, CATHETER, RIGHT HEART;  Surgeon: Dalia Crum MD;  Location: [facility] CATH LAB;  Service: Cardiology;  Laterality: Right;    RIGHT HEART CATHETERIZATION Right 10/31/2022    Procedure: INSERTION, CATHETER, RIGHT HEART;  Surgeon: Dalia Crum MD;  Location: [facility] CATH LAB;  Service: Cardiology;  Laterality: Right;    STERNAL WOUND CLOSURE N/A 6/30/2022    Procedure: CLOSURE, WOUND, STERNUM;  Surgeon: Luis F Paige MD;  [address]  Service: Cardiovascular;  Laterality: N/A;       Review of patient's allergies indicates:   Allergen Reactions    Bumex [bumetanide] Hives    Torsemide Hives        Medications:  Continuous Infusions:  Current Facility-Administered Medications   Medication Dose Route Frequency Last Rate Last Admin    milrinone 20mg/100ml D5W (200mcg/ml)  0.25 mcg/kg/min Intravenous Continuous 7.1 mL/hr at 04/26/24 0620 0.25 mcg/kg/min at 04/26/24 0620     Scheduled Meds:  Current Facility-Administered Medications   Medication Dose Route Frequency    amLODIPine  5 mg Oral Daily    aspirin  81 mg Oral Daily    atorvastatin  40 mg Oral Daily    folic acid-vit B6-vit B12 2.5-25-2 mg  1 tablet Oral Daily    furosemide  80 mg Oral Daily    gabapentin  300 mg Oral Daily    insulin detemir U-100  20 Units Subcutaneous BID    levETIRAcetam  1,500 mg Oral BID    magnesium oxide  400 mg Oral Daily    mirtazapine  15 mg Oral Nightly    pantoprazole  40 mg Oral Daily    sodium chloride 0.9%  10 mL Intravenous Q6H    spironolactone  25 mg Oral Daily    warfarin  3 mg Oral Once per day on Sunday Friday    warfarin  4.5 mg Oral Once per day on Monday Tuesday Wednesday Thursday Saturday     PRN Meds:  Current Facility-Administered Medications:     acetaminophen, 650 mg, Oral, Q6H PRN    cyclobenzaprine, 5 mg, Oral, TID PRN    dextrose 10%, 12.5 g, Intravenous, PRN    dextrose 10%, 12.5 g, Intravenous, PRN    dextrose 10%, 12.5 g, Intravenous, PRN    dextrose 10%, 12.5 g, Intravenous, PRN    dextrose 10%, 25 g, Intravenous, PRN    dextrose 10%, 25 g, Intravenous, PRN    dextrose 10%, 25 g, Intravenous, PRN    dextrose 10%, 25 g, Intravenous, PRN    ondansetron, 4 mg, Oral, Q8H PRN    Flushing PICC/Midline Protocol, , , Until Discontinued **AND** sodium chloride 0.9%, 10 mL, Intravenous, Q6H **AND** sodium chloride 0.9%, 10 mL, Intravenous, PRN    Family History       Problem Relation (Age of Onset)    Diverticulosis Brother    Heart attack Maternal Grandmother, Maternal Grandfather    Heart failure Father          Tobacco Use    Smoking status: Former     Current packs/day: 0.00     Average packs/day: 0.5  packs/day for 16.6 years (8.3 ttl pk-yrs)     Types: Cigarettes     Start date: 10/1/2004     Quit date: 4/23/2021     Years since quitting: 3.0    Smokeless tobacco: Never   Substance and Sexual Activity    Alcohol use: Not Currently    Drug use: Not Currently     Types: Marijuana, MDMA (Ecstacy)    Sexual activity: Yes     Partners: Female     Birth control/protection: None       Review of Systems   Constitutional:  Positive for fatigue.   Respiratory:  Positive for shortness of breath.    Musculoskeletal:         Cramping   Neurological:  Positive for weakness.     Objective:     Vital Signs (Most Recent):  Temp: 98.8 °F (37.1 °C) (04/26/24 0755)  Pulse: (!) 117 (04/26/24 1019)  Resp: 18 (04/26/24 0755)  BP: 96/72 (04/26/24 0758)  SpO2: 99 % (04/26/24 0755) Vital Signs (24h Range):  Temp:  [96.8 °F (36 °C)-99.2 °F (37.3 °C)] 98.8 °F (37.1 °C)  Pulse:  [114-127] 117  Resp:  [18] 18  SpO2:  [97 %-100 %] 99 %  BP: ()/(0-77) 96/72     Weight: 73.9 kg (162 lb 14.7 oz)  Body mass index is 20.36 kg/m².       Physical Exam  Constitutional:       General: He is awake. He is not in acute distress.  HENT:      Head: Normocephalic and atraumatic.   Eyes:      General: Lids are normal.      Conjunctiva/sclera: Conjunctivae normal.   Cardiovascular:      Comments: LVAD in place.   Musculoskeletal:      Comments: Normal ROM   Neurological:      Mental Status: He is alert and oriented to person, place, and time.   Psychiatric:         Behavior: Behavior is cooperative.            Review of Symptoms      Symptom Assessment (ESAS 0-10 Scale)  Pain:  0  Dyspnea:  0  Anxiety:  0  Nausea:  0  Depression:  0  Anorexia:  0  Fatigue:  0  Insomnia:  0  Restlessness:  0  Agitation:  0         Performance Status:  60    Living Arrangements:  Lives with family    Psychosocial/Cultural:   See Palliative Psychosocial Note: No  Pt lives with Robyn CATHERINE and 2 of his children love with him.   **Primary  to Follow**  Palliative  Care  Consult: Yes        Advance Care Planning   Advance Directives:   Medical Power of : Yes    Agent's Name:  Prema Wright    Decision Making:  Patient answered questions  Goals of Care: What is most important right now is to focus on quality of life, even if it means sacrificing a little time. Accordingly, we have decided that the best plan to meet the patient's goals includes continuing with treatment.         Significant Labs: All pertinent labs within the past 24 hours have been reviewed.  CBC:   Recent Labs   Lab 04/26/24 0527   WBC 13.70*   HGB 8.0*   HCT 29.9*   MCV 61*        BMP:  Recent Labs   Lab 04/26/24 0527   *   *   K 3.7   CL 95   CO2 27   BUN 19   CREATININE 1.2   CALCIUM 9.4   MG 2.0     LFT:  Lab Results   Component Value Date    AST 33 04/26/2024    ALKPHOS 200 (H) 04/26/2024    BILITOT 1.9 (H) 04/26/2024     Albumin:   Albumin   Date Value Ref Range Status   04/26/2024 2.8 (L) 3.5 - 5.2 g/dL Final     Protein:   Total Protein   Date Value Ref Range Status   04/26/2024 7.9 6.0 - 8.4 g/dL Final     Lactic acid:   Lab Results   Component Value Date    LACTATE 1.8 07/22/2023    LACTATE 2.2 06/03/2023       Significant Imaging: I have reviewed all pertinent imaging results/findings within the past 24 hours.

## 2024-04-27 VITALS
HEART RATE: 126 BPM | RESPIRATION RATE: 16 BRPM | WEIGHT: 162.94 LBS | HEIGHT: 75 IN | TEMPERATURE: 98 F | SYSTOLIC BLOOD PRESSURE: 86 MMHG | OXYGEN SATURATION: 100 % | BODY MASS INDEX: 20.26 KG/M2

## 2024-04-27 NOTE — PROGRESS NOTES
04/27/24 0307   Vital Signs   Temp 97.6 °F (36.4 °C)   Temp Source Oral   Pulse (!) 126   Heart Rate Source Monitor   Resp 16   SpO2 100 %   Device (Oxygen Therapy) room air   BP (!) 86/0   BP Method Doppler     Patient's ride to discharge  home is  at bedside. Double lumen PICC to KELLY present and at  home milirone  gtt  infusing. VitalsWNL and LVAD inventory complete. Telemetry monitor removed. Transport set  up with wheelchair and cart.

## 2024-04-27 NOTE — NURSING
Notified oncoming RN pt still waiting his wife to bring his milrinone bag to go home with and need to remove tele when pt leaving.

## 2024-04-27 NOTE — NURSING
SW notified RN wife Robyn will bring milrinone bag from home when she  pt, and pt called wife on the phone and Robyn said she will bring milrinone bag.

## 2024-04-27 NOTE — PROGRESS NOTES
04/27/24 0114   Vital Signs   Pulse (!) 142     Patient's HR sustaining above 135.Patient is awake and alert, respirations even and unlabored, denies chest pain or shortness of breath.  Patient denies any symptoms and states that he feels fine.  MD Penny with HTS notified. No new orders at this time, continue to monitor per MD. Will continue to monitor

## 2024-04-29 ENCOUNTER — ANTI-COAG VISIT (OUTPATIENT)
Dept: CARDIOLOGY | Facility: CLINIC | Age: 39
End: 2024-04-29
Payer: MEDICAID

## 2024-04-29 DIAGNOSIS — Z95.811 LVAD (LEFT VENTRICULAR ASSIST DEVICE) PRESENT: Primary | ICD-10-CM

## 2024-04-29 NOTE — PROGRESS NOTES
Hospital Course: Upon admission, patient noted to be hyperglycemia w/ glucose>500 and was started on insulin drip and then switched to basal bolus dosing as per Endocrinology once hyperglycemia resolved. Patient was also started on IV lasix for ADHF. Patient throughout the hospital course had difficulty taking lasix due to extreme cramping not related to electrolyte de-arrangement. Discussion was had with patient on whether he would rather switch to hospice care patient stated he would rather live with the discomfort associated w/ lasix. At the time of discharge patient was maintaining euvolemia w/ lasix PO 80mg daily. Pain was controlled w/ muscle relaxer. At the time of discharge patient was clinically and hemodynamically stable w/ no acute complaints. Patient to follow up w/ Endocrinology as outpatient for better management of his DM (A1c >14).     No changes noted to warfarin dosing during hospital stay.

## 2024-04-30 ENCOUNTER — TELEPHONE (OUTPATIENT)
Dept: TRANSPLANT | Facility: CLINIC | Age: 39
End: 2024-04-30
Payer: MEDICAID

## 2024-04-30 NOTE — TELEPHONE ENCOUNTER
----- Message from Jelly Hummel LPN sent at 12/13/2023  1:55 PM CST -----  Regarding: Weekly milrinone labs--link with pt/inr appointment/tara  [facility]- [place] [facility] (may/may not received results same day) // [facility] (Ph) [phone] (Cx) [phone] // Every monday labs  [facility] ph [phone] f- [phone]    NEW ORDERS FAXED TO TARA AS PT APPEARS TO BE VISITING THIS PARTICULAR FACILITY TO GET LABS DRAWN   ----- Message -----  From: Jelly Hummel LPN  Sent: 11/17/2022  10:05 AM CST  To: [facility]  Subject: Weekly milrinone labs--link with pt/inr appo#    [facility] [place] Drawstation (may/may not received results same day) / [facility] ph [phone]  ----- Message -----  From: Inessa Arana RN  Sent: 9/22/2022   8:42 AM CST  To: [facility]  Subject: Weekly milrinone labs                             [facility] ph [phone]

## 2024-04-30 NOTE — PROGRESS NOTES
Spoke to Ms. Kyle regarding discharge medication dosing. A discharge warfarin dose of 3 mg every Sun/Fri, and 4.5 mg  all other days confirmed with patient. INR scheduled for 05.03.24, at [facility], per patient request.    5/7 - Patient dis not go to the lab due to not feeling well. Missed 5/3 INR lab rescheduled for 5/9.

## 2024-04-30 NOTE — TELEPHONE ENCOUNTER
Called patient's lab regarding lab work, patient has not visited since 4/15. Team to attempt to obtain labs once patient goes to have them done.

## 2024-05-08 ENCOUNTER — PATIENT MESSAGE (OUTPATIENT)
Dept: CARDIOTHORACIC SURGERY | Facility: CLINIC | Age: 39
End: 2024-05-08
Payer: MEDICAID

## 2024-05-08 ENCOUNTER — TELEPHONE (OUTPATIENT)
Dept: TRANSPLANT | Facility: CLINIC | Age: 39
End: 2024-05-08
Payer: MEDICAID

## 2024-05-08 NOTE — TELEPHONE ENCOUNTER
Called both of the below labs, patient has not had any labs drawn this week.  Unable to contact patient.  ----- Message from Jelly Hummel LPN sent at 12/13/2023  1:55 PM CST -----  Regarding: Weekly milrinone labs--link with pt/inr appointment/tara  [facility] [place] Drawstation (may/may not received results same day) // [facility]  Lab (Ph) [phone] (Fx) [phone] // Every monday labs  [facility] ph [phone] f- [phone]    NEW ORDERS FAXED TO [facility]ARA AS PT APPEARS TO BE VISITING THIS PARTICULAR FACILITY TO GET LABS DRAWN   ----- Message -----  From: Jelly Hummel LPN  Sent: 11/17/2022  10:05 AM CST  To: [facility]  Subject: Weekly milrinone labs--link with pt/inr appo#    [facility] [place] Drawstation (may/may not received results same day) / [facility] ph [phone]  ----- Message -----  From: Inessa Arana RN  Sent: 9/22/2022   8:42 AM CST  To: [facility]  Subject: Weekly milrinone labs                             [facility] ph [phone]

## 2024-05-15 ENCOUNTER — TELEPHONE (OUTPATIENT)
Dept: TRANSPLANT | Facility: CLINIC | Age: 39
End: 2024-05-15
Payer: MEDICAID

## 2024-05-15 NOTE — TELEPHONE ENCOUNTER
----- Message from Jelly Hummel LPN sent at 12/13/2023  1:55 PM CST -----  Regarding: Weekly milrinone labs--link with pt/inr appointment/tara  [facility] [place] [facility]station (may/may not received results same day) // [facility]  Lab (Ph) [phone] (Sw) [phone] // Every monday labs  [facility] ph [phone] f- [phone]    NEW ORDERS FAXED TO [facility] AS PT APPEARS TO BE VISITING THIS PARTICULAR FACILITY TO GET LABS DRAWN   ----- Message -----  From: Jelly Hummel LPN  Sent: 11/17/2022  10:05 AM CST  To: [facility]  Subject: Weekly milrinone labs--link with pt/inr appo#    [facility] [place] Drawstation (may/may not received results same day) / [facility] ph [phone]  ----- Message -----  From: Inessa Arana RN  Sent: 9/22/2022   8:42 AM CST  To: [facility]  Subject: Weekly milrinone labs                             [facility] ph [phone]

## 2024-05-15 NOTE — TELEPHONE ENCOUNTER
Called patient's lab to obtain lab results. iTesha reported that patient has not visited since 4/16, called [facility] to see if patient had labs drawn with them, they reported they have not seen patient either. Piazza Message sent to patient.

## 2024-05-16 ENCOUNTER — TELEPHONE (OUTPATIENT)
Dept: TRANSPLANT | Facility: CLINIC | Age: 39
End: 2024-05-16
Payer: MEDICAID

## 2024-05-16 NOTE — TELEPHONE ENCOUNTER
Called patient in attempts to see if patient can come earlier. No answer, VM left with return call phone number.

## 2024-05-20 ENCOUNTER — HOSPITAL ENCOUNTER (INPATIENT)
Facility: HOSPITAL | Age: 39
LOS: 6 days | Discharge: HOME | DRG: 291 | End: 2024-05-26
Attending: EMERGENCY MEDICINE | Admitting: INTERNAL MEDICINE
Payer: MEDICAID

## 2024-05-20 ENCOUNTER — PATIENT MESSAGE (OUTPATIENT)
Dept: CARDIOTHORACIC SURGERY | Facility: CLINIC | Age: 39
End: 2024-05-20
Payer: MEDICAID

## 2024-05-20 ENCOUNTER — ANTI-COAG VISIT (OUTPATIENT)
Dept: CARDIOLOGY | Facility: CLINIC | Age: 39
End: 2024-05-20
Payer: MEDICAID

## 2024-05-20 ENCOUNTER — DOCUMENTATION ONLY (OUTPATIENT)
Dept: TRANSPLANT | Facility: CLINIC | Age: 39
End: 2024-05-20
Payer: MEDICAID

## 2024-05-20 DIAGNOSIS — R06.02 SHORTNESS OF BREATH: ICD-10-CM

## 2024-05-20 DIAGNOSIS — Z95.811 LVAD (LEFT VENTRICULAR ASSIST DEVICE) PRESENT: Primary | ICD-10-CM

## 2024-05-20 DIAGNOSIS — I50.43 ACUTE ON CHRONIC COMBINED SYSTOLIC AND DIASTOLIC HEART FAILURE: Primary | ICD-10-CM

## 2024-05-20 DIAGNOSIS — Z95.811 LVAD (LEFT VENTRICULAR ASSIST DEVICE) PRESENT: ICD-10-CM

## 2024-05-20 DIAGNOSIS — I50.43 ACUTE ON CHRONIC COMBINED SYSTOLIC (CONGESTIVE) AND DIASTOLIC (CONGESTIVE) HEART FAILURE: ICD-10-CM

## 2024-05-20 DIAGNOSIS — E11.65 TYPE 2 DIABETES MELLITUS WITH HYPERGLYCEMIA, WITH LONG-TERM CURRENT USE OF INSULIN: ICD-10-CM

## 2024-05-20 DIAGNOSIS — Z79.4 TYPE 2 DIABETES MELLITUS WITH HYPERGLYCEMIA, WITH LONG-TERM CURRENT USE OF INSULIN: ICD-10-CM

## 2024-05-20 LAB
ALBUMIN SERPL BCP-MCNC: 3 G/DL (ref 3.5–5.2)
ALLENS TEST: NORMAL
ALP SERPL-CCNC: 170 U/L (ref 55–135)
ALT SERPL W/O P-5'-P-CCNC: 11 U/L (ref 10–44)
ANION GAP SERPL CALC-SCNC: 6 MMOL/L (ref 8–16)
AST SERPL-CCNC: 23 U/L (ref 10–40)
BASOPHILS # BLD AUTO: 0.03 K/UL (ref 0–0.2)
BASOPHILS NFR BLD: 0.5 % (ref 0–1.9)
BILIRUB SERPL-MCNC: 1.9 MG/DL (ref 0.1–1)
BNP SERPL-MCNC: 1168 PG/ML (ref 0–99)
BUN SERPL-MCNC: 11 MG/DL (ref 6–30)
BUN SERPL-MCNC: 12 MG/DL (ref 6–20)
CALCIUM SERPL-MCNC: 8.8 MG/DL (ref 8.7–10.5)
CHLORIDE SERPL-SCNC: 103 MMOL/L (ref 95–110)
CHLORIDE SERPL-SCNC: 107 MMOL/L (ref 95–110)
CO2 SERPL-SCNC: 25 MMOL/L (ref 23–29)
CREAT SERPL-MCNC: 1 MG/DL (ref 0.5–1.4)
CREAT SERPL-MCNC: 1 MG/DL (ref 0.5–1.4)
DIFFERENTIAL METHOD BLD: ABNORMAL
EOSINOPHIL # BLD AUTO: 0.1 K/UL (ref 0–0.5)
EOSINOPHIL NFR BLD: 0.8 % (ref 0–8)
ERYTHROCYTE [DISTWIDTH] IN BLOOD BY AUTOMATED COUNT: 24.8 % (ref 11.5–14.5)
EST. GFR  (NO RACE VARIABLE): >60 ML/MIN/1.73 M^2
GLUCOSE SERPL-MCNC: 62 MG/DL (ref 70–110)
GLUCOSE SERPL-MCNC: 63 MG/DL (ref 70–110)
GRAM STN SPEC: NORMAL
GRAM STN SPEC: NORMAL
HCT VFR BLD AUTO: 28.2 % (ref 40–54)
HCT VFR BLD CALC: 30 %PCV (ref 36–54)
HGB BLD-MCNC: 7.5 G/DL (ref 14–18)
IMM GRANULOCYTES # BLD AUTO: 0.01 K/UL (ref 0–0.04)
IMM GRANULOCYTES NFR BLD AUTO: 0.2 % (ref 0–0.5)
INR PPP: 5.4 (ref 0.8–1.2)
LDH SERPL L TO P-CCNC: 2.19 MMOL/L (ref 0.5–2.2)
LDH SERPL L TO P-CCNC: 295 U/L (ref 110–260)
LYMPHOCYTES # BLD AUTO: 1.1 K/UL (ref 1–4.8)
LYMPHOCYTES NFR BLD: 18 % (ref 18–48)
MAGNESIUM SERPL-MCNC: 1.8 MG/DL (ref 1.6–2.6)
MCH RBC QN AUTO: 17 PG (ref 27–31)
MCHC RBC AUTO-ENTMCNC: 26.6 G/DL (ref 32–36)
MCV RBC AUTO: 64 FL (ref 82–98)
MONOCYTES # BLD AUTO: 0.5 K/UL (ref 0.3–1)
MONOCYTES NFR BLD: 7.8 % (ref 4–15)
NEUTROPHILS # BLD AUTO: 4.5 K/UL (ref 1.8–7.7)
NEUTROPHILS NFR BLD: 72.7 % (ref 38–73)
NRBC BLD-RTO: 0 /100 WBC
OHS QRS DURATION: 180 MS
OHS QTC CALCULATION: 485 MS
PLATELET # BLD AUTO: 265 K/UL (ref 150–450)
PMV BLD AUTO: 9.1 FL (ref 9.2–12.9)
POC IONIZED CALCIUM: 1.17 MMOL/L (ref 1.06–1.42)
POC TCO2 (MEASURED): 23 MMOL/L (ref 23–29)
POCT GLUCOSE: 182 MG/DL (ref 70–110)
POTASSIUM BLD-SCNC: 3.3 MMOL/L (ref 3.5–5.1)
POTASSIUM SERPL-SCNC: 3.3 MMOL/L (ref 3.5–5.1)
PROT SERPL-MCNC: 8.5 G/DL (ref 6–8.4)
PROTHROMBIN TIME: 53.1 SEC (ref 9–12.5)
RBC # BLD AUTO: 4.4 M/UL (ref 4.6–6.2)
SAMPLE: ABNORMAL
SAMPLE: NORMAL
SITE: NORMAL
SODIUM BLD-SCNC: 140 MMOL/L (ref 136–145)
SODIUM SERPL-SCNC: 138 MMOL/L (ref 136–145)
TROPONIN I SERPL DL<=0.01 NG/ML-MCNC: 0.02 NG/ML (ref 0–0.03)
WBC # BLD AUTO: 6.16 K/UL (ref 3.9–12.7)

## 2024-05-20 PROCEDURE — 25000003 PHARM REV CODE 250: Performed by: STUDENT IN AN ORGANIZED HEALTH CARE EDUCATION/TRAINING PROGRAM

## 2024-05-20 PROCEDURE — 63600175 PHARM REV CODE 636 W HCPCS: Performed by: STUDENT IN AN ORGANIZED HEALTH CARE EDUCATION/TRAINING PROGRAM

## 2024-05-20 PROCEDURE — 83615 LACTATE (LD) (LDH) ENZYME: CPT | Performed by: EMERGENCY MEDICINE

## 2024-05-20 PROCEDURE — 63600175 PHARM REV CODE 636 W HCPCS: Performed by: NURSE PRACTITIONER

## 2024-05-20 PROCEDURE — 84484 ASSAY OF TROPONIN QUANT: CPT | Performed by: EMERGENCY MEDICINE

## 2024-05-20 PROCEDURE — 83605 ASSAY OF LACTIC ACID: CPT

## 2024-05-20 PROCEDURE — 85025 COMPLETE CBC W/AUTO DIFF WBC: CPT | Performed by: EMERGENCY MEDICINE

## 2024-05-20 PROCEDURE — 20600001 HC STEP DOWN PRIVATE ROOM

## 2024-05-20 PROCEDURE — 83880 ASSAY OF NATRIURETIC PEPTIDE: CPT | Performed by: EMERGENCY MEDICINE

## 2024-05-20 PROCEDURE — 36573 INSJ PICC RS&I 5 YR+: CPT

## 2024-05-20 PROCEDURE — 02HV33Z INSERTION OF INFUSION DEVICE INTO SUPERIOR VENA CAVA, PERCUTANEOUS APPROACH: ICD-10-PCS | Performed by: INTERNAL MEDICINE

## 2024-05-20 PROCEDURE — 99222 1ST HOSP IP/OBS MODERATE 55: CPT | Mod: ,,, | Performed by: NURSE PRACTITIONER

## 2024-05-20 PROCEDURE — 83735 ASSAY OF MAGNESIUM: CPT | Performed by: EMERGENCY MEDICINE

## 2024-05-20 PROCEDURE — 87205 SMEAR GRAM STAIN: CPT | Performed by: INTERNAL MEDICINE

## 2024-05-20 PROCEDURE — 99223 1ST HOSP IP/OBS HIGH 75: CPT | Mod: ,,, | Performed by: INTERNAL MEDICINE

## 2024-05-20 PROCEDURE — 93010 ELECTROCARDIOGRAM REPORT: CPT | Mod: ,,, | Performed by: INTERNAL MEDICINE

## 2024-05-20 PROCEDURE — 80053 COMPREHEN METABOLIC PANEL: CPT | Performed by: EMERGENCY MEDICINE

## 2024-05-20 PROCEDURE — 99900035 HC TECH TIME PER 15 MIN (STAT)

## 2024-05-20 PROCEDURE — 76937 US GUIDE VASCULAR ACCESS: CPT

## 2024-05-20 PROCEDURE — 85610 PROTHROMBIN TIME: CPT | Performed by: EMERGENCY MEDICINE

## 2024-05-20 PROCEDURE — 93005 ELECTROCARDIOGRAM TRACING: CPT

## 2024-05-20 PROCEDURE — 93750 INTERROGATION VAD IN PERSON: CPT | Mod: ,,, | Performed by: INTERNAL MEDICINE

## 2024-05-20 PROCEDURE — C1751 CATH, INF, PER/CENT/MIDLINE: HCPCS

## 2024-05-20 PROCEDURE — 87075 CULTR BACTERIA EXCEPT BLOOD: CPT | Performed by: INTERNAL MEDICINE

## 2024-05-20 PROCEDURE — 99285 EMERGENCY DEPT VISIT HI MDM: CPT | Mod: 25

## 2024-05-20 PROCEDURE — 87070 CULTURE OTHR SPECIMN AEROBIC: CPT | Performed by: INTERNAL MEDICINE

## 2024-05-20 PROCEDURE — 27000248 HC VAD-ADDITIONAL DAY

## 2024-05-20 RX ORDER — IBUPROFEN 200 MG
16 TABLET ORAL
Status: DISCONTINUED | OUTPATIENT
Start: 2024-05-20 | End: 2024-05-27 | Stop reason: HOSPADM

## 2024-05-20 RX ORDER — INSULIN ASPART 100 [IU]/ML
0-10 INJECTION, SOLUTION INTRAVENOUS; SUBCUTANEOUS
Status: DISCONTINUED | OUTPATIENT
Start: 2024-05-20 | End: 2024-05-27 | Stop reason: HOSPADM

## 2024-05-20 RX ORDER — LANOLIN ALCOHOL/MO/W.PET/CERES
400 CREAM (GRAM) TOPICAL DAILY
Status: DISCONTINUED | OUTPATIENT
Start: 2024-05-21 | End: 2024-05-27 | Stop reason: HOSPADM

## 2024-05-20 RX ORDER — CEFADROXIL 500 MG/1
500 CAPSULE ORAL EVERY 12 HOURS
Status: DISCONTINUED | OUTPATIENT
Start: 2024-05-20 | End: 2024-05-27 | Stop reason: HOSPADM

## 2024-05-20 RX ORDER — MIRTAZAPINE 15 MG/1
15 TABLET, FILM COATED ORAL NIGHTLY
Status: DISCONTINUED | OUTPATIENT
Start: 2024-05-20 | End: 2024-05-27 | Stop reason: HOSPADM

## 2024-05-20 RX ORDER — MILRINONE LACTATE 0.2 MG/ML
0.25 INJECTION, SOLUTION INTRAVENOUS CONTINUOUS
Status: DISCONTINUED | OUTPATIENT
Start: 2024-05-20 | End: 2024-05-27 | Stop reason: HOSPADM

## 2024-05-20 RX ORDER — ATORVASTATIN CALCIUM 20 MG/1
40 TABLET, FILM COATED ORAL DAILY
Status: DISCONTINUED | OUTPATIENT
Start: 2024-05-21 | End: 2024-05-27 | Stop reason: HOSPADM

## 2024-05-20 RX ORDER — INSULIN GLARGINE 100 [IU]/ML
10 INJECTION, SOLUTION SUBCUTANEOUS DAILY
Status: DISCONTINUED | OUTPATIENT
Start: 2024-05-21 | End: 2024-05-20

## 2024-05-20 RX ORDER — FUROSEMIDE 10 MG/ML
80 INJECTION INTRAMUSCULAR; INTRAVENOUS EVERY 8 HOURS
Status: DISCONTINUED | OUTPATIENT
Start: 2024-05-20 | End: 2024-05-24

## 2024-05-20 RX ORDER — MAGNESIUM SULFATE HEPTAHYDRATE 40 MG/ML
2 INJECTION, SOLUTION INTRAVENOUS ONCE
Status: COMPLETED | OUTPATIENT
Start: 2024-05-20 | End: 2024-05-20

## 2024-05-20 RX ORDER — ASPIRIN 81 MG/1
81 TABLET ORAL DAILY
Status: DISCONTINUED | OUTPATIENT
Start: 2024-05-21 | End: 2024-05-27 | Stop reason: HOSPADM

## 2024-05-20 RX ORDER — CYCLOBENZAPRINE HCL 5 MG
5 TABLET ORAL 3 TIMES DAILY PRN
Status: DISCONTINUED | OUTPATIENT
Start: 2024-05-20 | End: 2024-05-27 | Stop reason: HOSPADM

## 2024-05-20 RX ORDER — SODIUM CHLORIDE 0.9 % (FLUSH) 0.9 %
10 SYRINGE (ML) INJECTION EVERY 6 HOURS
Status: DISCONTINUED | OUTPATIENT
Start: 2024-05-20 | End: 2024-05-27 | Stop reason: HOSPADM

## 2024-05-20 RX ORDER — SPIRONOLACTONE 25 MG/1
25 TABLET ORAL DAILY
Status: DISCONTINUED | OUTPATIENT
Start: 2024-05-21 | End: 2024-05-27 | Stop reason: HOSPADM

## 2024-05-20 RX ORDER — INSULIN GLARGINE 100 [IU]/ML
10 INJECTION, SOLUTION SUBCUTANEOUS NIGHTLY
Status: DISCONTINUED | OUTPATIENT
Start: 2024-05-20 | End: 2024-05-20

## 2024-05-20 RX ORDER — INSULIN GLARGINE 100 [IU]/ML
15 INJECTION, SOLUTION SUBCUTANEOUS NIGHTLY
Status: DISCONTINUED | OUTPATIENT
Start: 2024-05-20 | End: 2024-05-21

## 2024-05-20 RX ORDER — GLUCAGON 1 MG
1 KIT INJECTION
Status: DISCONTINUED | OUTPATIENT
Start: 2024-05-20 | End: 2024-05-27 | Stop reason: HOSPADM

## 2024-05-20 RX ORDER — LEVETIRACETAM 500 MG/1
1500 TABLET ORAL 2 TIMES DAILY
Status: DISCONTINUED | OUTPATIENT
Start: 2024-05-20 | End: 2024-05-27 | Stop reason: HOSPADM

## 2024-05-20 RX ORDER — PANTOPRAZOLE SODIUM 40 MG/1
40 TABLET, DELAYED RELEASE ORAL DAILY
Status: DISCONTINUED | OUTPATIENT
Start: 2024-05-21 | End: 2024-05-27 | Stop reason: HOSPADM

## 2024-05-20 RX ORDER — ACETAMINOPHEN 325 MG/1
650 TABLET ORAL EVERY 6 HOURS PRN
Status: DISCONTINUED | OUTPATIENT
Start: 2024-05-20 | End: 2024-05-27 | Stop reason: HOSPADM

## 2024-05-20 RX ORDER — INSULIN GLARGINE 100 [IU]/ML
15 INJECTION, SOLUTION SUBCUTANEOUS DAILY
Status: DISCONTINUED | OUTPATIENT
Start: 2024-05-21 | End: 2024-05-21

## 2024-05-20 RX ORDER — AMLODIPINE BESYLATE 5 MG/1
5 TABLET ORAL DAILY
Status: DISCONTINUED | OUTPATIENT
Start: 2024-05-21 | End: 2024-05-27 | Stop reason: HOSPADM

## 2024-05-20 RX ORDER — IBUPROFEN 200 MG
24 TABLET ORAL
Status: DISCONTINUED | OUTPATIENT
Start: 2024-05-20 | End: 2024-05-27 | Stop reason: HOSPADM

## 2024-05-20 RX ORDER — INSULIN ASPART 100 [IU]/ML
14 INJECTION, SOLUTION INTRAVENOUS; SUBCUTANEOUS
Status: DISCONTINUED | OUTPATIENT
Start: 2024-05-21 | End: 2024-05-21

## 2024-05-20 RX ORDER — POTASSIUM CHLORIDE 750 MG/1
50 CAPSULE, EXTENDED RELEASE ORAL ONCE
Status: COMPLETED | OUTPATIENT
Start: 2024-05-20 | End: 2024-05-20

## 2024-05-20 RX ORDER — SODIUM CHLORIDE 0.9 % (FLUSH) 0.9 %
10 SYRINGE (ML) INJECTION
Status: DISCONTINUED | OUTPATIENT
Start: 2024-05-20 | End: 2024-05-27 | Stop reason: HOSPADM

## 2024-05-20 RX ADMIN — FUROSEMIDE 80 MG: 10 INJECTION, SOLUTION INTRAVENOUS at 05:05

## 2024-05-20 RX ADMIN — MIRTAZAPINE 15 MG: 15 TABLET, FILM COATED ORAL at 09:05

## 2024-05-20 RX ADMIN — LEVETIRACETAM 1500 MG: 500 TABLET, FILM COATED ORAL at 09:05

## 2024-05-20 RX ADMIN — FUROSEMIDE 80 MG: 10 INJECTION, SOLUTION INTRAVENOUS at 09:05

## 2024-05-20 RX ADMIN — MAGNESIUM SULFATE HEPTAHYDRATE 2 G: 40 INJECTION, SOLUTION INTRAVENOUS at 04:05

## 2024-05-20 RX ADMIN — MILRINONE LACTATE IN DEXTROSE 0.25 MCG/KG/MIN: 200 INJECTION, SOLUTION INTRAVENOUS at 06:05

## 2024-05-20 RX ADMIN — INSULIN GLARGINE 15 UNITS: 100 INJECTION, SOLUTION SUBCUTANEOUS at 09:05

## 2024-05-20 RX ADMIN — CEFADROXIL 500 MG: 500 CAPSULE ORAL at 09:05

## 2024-05-20 RX ADMIN — POTASSIUM CHLORIDE 50 MEQ: 10 CAPSULE, COATED, EXTENDED RELEASE ORAL at 04:05

## 2024-05-20 RX ADMIN — INSULIN ASPART 1 UNITS: 100 INJECTION, SOLUTION INTRAVENOUS; SUBCUTANEOUS at 09:05

## 2024-05-20 RX ADMIN — POTASSIUM BICARBONATE 25 MEQ: 978 TABLET, EFFERVESCENT ORAL at 04:05

## 2024-05-20 NOTE — NURSING
Patient seen in the ER. LVAD DLES dressing change completed and culture taken. Patient has been out of the daily kits so they have been using one chlorhexidine stick per dressing change and bought gauze and tape from the pharmacy.     LVAD dressing change completed using sterile technique with daily kit by myself. DLES is a 2 with minimal drainage noted on the drain sponge and around DLES. Site is not fully incorporated; patient admits to sleeping on his DLES. Unable to manipulate more drainage on palpation and abdomen is not tender to palpation. DLES itself is tender when getting the culture, patient screamed in pain. Patient also screamed in pain when cleaning the surrounding skin as there are skin tears. I placed sterile nonadhesive gauze over skin tears and did not use the skin barrier. We can order calmoseptine for skin tears/irritation if not better during admission.    I also gave patient a HM3 Loaner Emergency bag until Robyn hendrix, can bring his back to the hospital.   Components of HM3 Loaner bag:  HM3 Controller SN: [id number]  Batteries SN: [id number]  2 battery clips: [id number], [id number]

## 2024-05-20 NOTE — H&P
[facility] Emergency Dept  Heart Transplant  H&P    Patient Name: Kevan Queen  MRN: [id number]  Admission Date: 5/20/2024  Attending Physician: Marlo Marques MD  Primary Care Provider: Rosio Armendariz FNP  Principal Problem:Acute on chronic combined systolic and diastolic heart failure    Subjective:     History of Present Illness:  Mr. 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 He underwent RVAD removal and chest closure 6/30/2022.  He also completed a course of IV Abx for staph epi. He was weaned off  but he had to restarted due to RVF. He was eventually transitioned to milrinone (secondary to  shortage) now on 0.25 mcg/kg/min. He has a history of unclear syncope vs seizures and is followed by neuro for this and is on Keppra.       On 11/15/23 underwent removal of eroded [employer] scICD--no Lifevest (due to LVAD) and no plan to replace ICD as not requiring therapy post LVAD with concern for reinfection.  He has not had neurology f/u with seizure hx on keppra so coordinator to assist him with obtaining appt.    Patient presented today for routine HTS clinic appointment but was found to hunched over in a chair w/ marked dyspnea. Patient sent directly to the ED for further evaluation. States Picc line stopped working last night. Patient currently endorses chronic SOB w.exertion, but denies any SOB at rest, orthopnea, chest pain, fever, chills, nausea, vomiting, abdominal pain, abd distention, PND. States instead of take Lasix 80mg daily, he was taking Lasix 40mg bid.     Of note, patient was most recently hospitalized from 4/20-4/26 for ADHF and hyperglycemia. patient noted to be hyperglycemia w/ glucose>500 and  was started on insulin drip and then switched to basal bolus dosing as per Endocrinology once hyperglycemia resolved. Patient was also started on IV lasix for ADHF. Patient throughout the hospital course had difficulty taking lasix due to extreme cramping not related to electrolyte de-arrangement. Discussion was had with patient on whether he would rather switch to hospice care patient stated he would rather live with the discomfort associated w/ lasix. At the time of discharge patient was maintaining euvolemia w/ lasix PO 80mg daily.    Home Medications:   amlodipine besylate 5 mg Oral Daily  aspirin 81 mg Oral Daily  atorvastatin calcium 40 mg Oral Daily  cefadroxil 500 mg Oral Every 12 hours  cyclobenzaprine HCl 5 mg Oral 3 times daily PRN  furosemide 80 mg Oral Daily  Insulin detemir 20 units bid  Insulin aspart 18units tid  Keppra 1500mg bid  magnesium oxide 400 mg Oral Daily  milrinone lactate,milrinone lactate/D5W 0.25 mcg/kg/min (23.6 mcg/min) Intravenous Continuous  mirtazapine 15 mg Oral Nightly  pantoprazole sodium 40 mg Oral Daily  potassium chloride 10 MEQ 30 mEq Oral Daily  spironolactone 25 mg Oral Daily  warfarin sodium 3 MG Take 1.5 tablets (4.5mg) orally daily, except 1 tablet (3mg) on Sundays and Fridays        Cardiac Imaging:   Echo (9/2/2023): HM3 5100. AV does not open. IV septum midline. LV sevewrely dilated w/ normal ventricular mass, normal wall thickness, normal wall motion, severely reduced systolic function w/ EF 10-15%, and normal diastolic function. RV normal cavity size w/ normal wall thickness, normal wall motion, and normal systolic function. Mild MR. Mod to severe TR. PA sys pressure 38mmHg. LVIDd 7.11cm.     RHC (10/31/2022): RA 18, PWP 21, CO 3.55, CI 1.6. On mil 0.125 and HM3 5100.     Past Medical History:   Diagnosis Date    Acute respiratory failure with hypoxia 07/22/2023    Arthritis     Awareness alteration, transient 09/01/2022    Cardiomyopathy     CHF (congestive heart  failure) 10/01/2020    COVID-19 06/03/2023    Diabetes mellitus     Dilated cardiomyopathy 10/26/2020    Drug abuse 10/2020    Headache 04/19/2023    Hyperglycemia 12/16/2022    Hyperosmolar hyperglycemic state (HHS) 05/25/2022    ICD (implantable cardioverter-defibrillator) in place 10/26/2020    Infected defibrillator now s/p removal Nov 2023 07/23/2023    Left ventricular assist device (LVAD) complication, initial encounter 06/05/2023    Muscle cramping 06/15/2022    Renal disorder     SOB (shortness of breath) 06/13/2022    Tingling in extremities 07/13/2022       Past Surgical History:   Procedure Laterality Date    APPLICATION OF WOUND VACUUM-ASSISTED CLOSURE DEVICE N/A 6/30/2022    Procedure: APPLICATION, WOUND VAC;  Surgeon: Luis F Paige MD;  Location: [facility] OR [address] [place] [address];  Service: Cardiovascular;  Laterality: N/A;  50 x 5 cm    CARDIAC DEFIBRILLATOR PLACEMENT      ECHOCARDIOGRAM,TRANSESOPHAGEAL  11/9/2023    Procedure: Transesophageal echo (GUILLERMO) intra-procedure log documentation;  Surgeon: Eron Ivy MD;  Location: [facility] EP LAB;  Service: Cardiology;;    EXTRACTION, ELECTRODE LEAD Left 11/9/2023    Procedure: EXTRACTION, ELECTRODE LEAD;  Surgeon: ADALID Leon MD;  Location: [facility] EP LAB;  Service: Cardiology;  Laterality: Left;  INFECTION, LEAD EXTRACTION, GUILLERMO, BSCI, ANES, EH,   *NO CTS BACKUP*    IMPLANTATION OF RIGHT VENTRICULAR ASSIST DEVICE (RVAD) N/A 6/29/2022    Procedure: INSERTION, RVAD;  Surgeon: Luis F Paige MD;  Location: [facility] OR [address] [place] [address];  Service: Cardiovascular;  Laterality: N/A;    INSERTION OF GRAFT TO PERICARDIUM Right 6/30/2022    Procedure: INSERTION-RIGHT VENTRICULAR ASSIST DEVICE;  Surgeon: Luis F Paige MD;  Location: [facility] OR [address] [place] [address];  Service: Cardiovascular;  Laterality: Right;    IRRIGATION OF MEDIASTINUM  6/30/2022    Procedure: IRRIGATION, MEDIASTINUM;  Surgeon: Luis F Paige MD;  Location: [facility] OR [address] [place] [address];  Service: Cardiovascular;;    LEFT VENTRICULAR ASSIST  DEVICE Left 6/23/2022    Procedure: INSERTION-LEFT VENTRICULAR ASSIST DEVICE;  Surgeon: Luis F Paige MD;  Location: [facility] OR [facility];  Service: Cardiovascular;  Laterality: Left;    LEFT VENTRICULAR ASSIST DEVICE N/A 6/29/2022    Procedure: INSERTION-LEFT VENTRICULAR ASSIST DEVICE;  Surgeon: Luis F Paige MD;  [facility];  Service: Cardiovascular;  Laterality: N/A;    REVISION OF SKIN POCKET FOR CARDIOVERTER-DEFIBRILLATOR  11/9/2023    Procedure: REVISION, SKIN POCKET, FOR CARDIOVERTER-DEFIBRILLATOR;  Surgeon: ADALID Leon MD;  Location: [facility] EP LAB;  Service: Cardiology;;    RIGHT HEART CATHETERIZATION Right 4/8/2022    Procedure: INSERTION, CATHETER, RIGHT HEART;  Surgeon: Luca Lopez Jr., MD;  Location: [facility] CATH LAB;  Service: Cardiology;  Laterality: Right;    RIGHT HEART CATHETERIZATION Right 4/19/2022    Procedure: INSERTION, CATHETER, RIGHT HEART;  Surgeon: Josh Pulido MD;  Location: [facility] CATH LAB;  Service: Cardiology;  Laterality: Right;    RIGHT HEART CATHETERIZATION Right 7/21/2022    Procedure: INSERTION, CATHETER, RIGHT HEART;  Surgeon: Dalia Crum MD;  Location: [facility] CATH LAB;  Service: Cardiology;  Laterality: Right;    RIGHT HEART CATHETERIZATION Right 10/31/2022    Procedure: INSERTION, CATHETER, RIGHT HEART;  Surgeon: Dalia Crum MD;  Location: [facility] CATH LAB;  Service: Cardiology;  Laterality: Right;    STERNAL WOUND CLOSURE N/A 6/30/2022    Procedure: CLOSURE, WOUND, STERNUM;  Surgeon: Luis F Paige MD;  [facility];  Service: Cardiovascular;  Laterality: N/A;       Review of patient's allergies indicates:   Allergen Reactions    Bumex [bumetanide] Hives    Torsemide Hives       Current Facility-Administered Medications   Medication    acetaminophen tablet 650 mg    [START ON 5/21/2024] amLODIPine tablet 5 mg    [START ON 5/21/2024] aspirin EC tablet 81 mg    [START ON 5/21/2024] atorvastatin tablet 40 mg    cefadroxil capsule 500  mg    cyclobenzaprine tablet 5 mg    furosemide injection 80 mg    levETIRAcetam tablet 1,500 mg    [START ON 5/21/2024] magnesium oxide tablet 400 mg    magnesium sulfate 2g in water 50mL IVPB (premix)    milrinone 20mg in D5W 100 mL infusion    mirtazapine tablet 15 mg    [START ON 5/21/2024] pantoprazole EC tablet 40 mg    [START ON 5/21/2024] spironolactone tablet 25 mg     Current Outpatient Medications   Medication Sig    acetaminophen (TYLENOL) 325 MG tablet Take 2 tablets (650 mg total) by mouth every 6 (six) hours as needed for Pain.    amLODIPine (NORVASC) 5 MG tablet Take 1 tablet (5 mg total) by mouth once daily.    aspirin (ECOTRIN) 81 MG EC tablet Take 1 tablet (81 mg total) by mouth once daily.    atorvastatin (LIPITOR) 40 MG tablet Take 1 tablet (40 mg total) by mouth once daily.    blood sugar diagnostic Strp Use to test blood glucose 4 (four) times daily.    blood-glucose meter (TRUE METRIX GLUCOSE METER) Misc Use to test blood glucose 4 (four) times daily.    blood-glucose sensor (FREESTYLE LUCIUS 3 SENSOR) Dee 1 Device by Misc.(Non-Drug; Combo Route) route every 14 (fourteen) days.    cefadroxil (DURICEF) 500 MG Cap Take 1 capsule (500 mg total) by mouth every 12 (twelve) hours.    furosemide (LASIX) 80 MG tablet Take 1 tablet (80 mg total) by mouth once daily.    glucagon (BAQSIMI) 3 mg/actuation Spry 1 each by Nasal route as needed (hypoglycemia).    insulin aspart U-100 (NOVOLOG) 100 unit/mL (3 mL) InPn pen Inject 18 Units into the skin 3 (three) times daily with meals: MAX 94 units/daily  150-200    201-250    251-300    301-350    >350  +2 units   +4 units    +6 units    +8 units    +10 units    insulin detemir U-100, Levemir, 100 unit/mL (3 mL) SubQ InPn pen Inject 20 Units into the skin 2 (two) times daily. In the morning and before bed.    lancets 33 gauge Misc Use to test blood glucose 4 (four) times daily.    levETIRAcetam (KEPPRA) 1000 MG tablet Take 1.5 tablets (1,500 mg total) by  "mouth 2 (two) times daily.    magnesium oxide (MAG-OX) 400 mg (241.3 mg magnesium) tablet Take 1 tablet (400 mg total) by mouth once daily.    milrinone 20mg/100ml D5W, 200mcg/ml, (PRIMACOR) 20 mg/100 mL (200 mcg/mL) infusion Inject 23.6 mcg/min into the vein continuous.    mirtazapine (REMERON) 15 MG tablet Take 1 tablet (15 mg total) by mouth nightly.    ondansetron (ZOFRAN-ODT) 4 MG TbDL Take 1 tablet (4 mg total) by mouth every 8 (eight) hours as needed (nausea).    pantoprazole (PROTONIX) 40 MG tablet Take 1 tablet (40 mg total) by mouth once daily.    pen needle, diabetic 32 gauge x 5/32" Ndle 1 each by Misc.(Non-Drug; Combo Route) route 4 (four) times daily.    potassium chloride SA (K-DUR,KLOR-CON M) 10 MEQ tablet Take 3 tablets (30 mEq total) by mouth once daily.    spironolactone (ALDACTONE) 25 MG tablet Take 1 tablet (25 mg total) by mouth once daily.    warfarin (COUMADIN) 3 MG tablet Take 1.5 tablets (4.5mg) orally daily, except 1 tablet (3mg) on Sundays and Fridays     Family History       Problem Relation (Age of Onset)    Diverticulosis Brother    Heart attack Maternal Grandmother, Maternal Grandfather    Heart failure Father          Tobacco Use    Smoking status: Former     Current packs/day: 0.00     Average packs/day: 0.5 packs/day for 16.6 years (8.3 ttl pk-yrs)     Types: Cigarettes     Start date: 10/1/2004     Quit date: 4/23/2021     Years since quitting: 3.0    Smokeless tobacco: Never   Substance and Sexual Activity    Alcohol use: Not Currently    Drug use: Not Currently     Types: Marijuana, MDMA (Ecstacy)    Sexual activity: Yes     Partners: Female     Birth control/protection: None     Review of [id number] point ROS negative except for HPI  Objective:     Vital Signs (Most Recent):  Temp: 98.1 °F (36.7 °C) (05/20/24 1513)  Pulse: 93 (05/20/24 1653)  Resp: 17 (05/20/24 1653)  BP: (!) 78/0 (05/20/24 1547)  SpO2: 95 % (05/20/24 1653) Vital Signs (24h Range):  Temp:  [98.1 °F (36.7 °C)] " 98.1 °F (36.7 °C)  Pulse:  [] 93  Resp:  [17-24] 17  SpO2:  [95 %-100 %] 95 %  BP: (78)/(0) 78/0     Patient Vitals for the past 72 hrs (Last 3 readings):   Weight   05/20/24 1513 81.6 kg (180 lb)     Body mass index is 22.5 kg/m².    No intake or output data in the 24 hours ending 05/20/24 1714       Physical Exam  Constitutional:       General: He is not in acute distress.     Appearance: Normal appearance.   HENT:      Head: Normocephalic and atraumatic.   Eyes:      Conjunctiva/sclera: Conjunctivae normal.   Neck:      Vascular: JVD present.      Comments: JVP to the 18cm of H20  Cardiovascular:      Comments: VAD hum appreciated  Pulmonary:      Breath sounds: Normal breath sounds.      Comments: Clear to auscultation  Abdominal:      Comments: Soft, non-tender, non-distended   Musculoskeletal:      Cervical back: Normal range of motion.      Comments: Bilateral LE pitting edema to the knees.    Skin:     General: Skin is warm and dry.      Capillary Refill: Capillary refill takes less than 2 seconds.   Neurological:      Mental Status: He is alert and oriented to person, place, and time.   Psychiatric:         Mood and Affect: Mood and affect normal.            Significant Labs:  CBC:  Recent Labs   Lab 05/20/24  1422 05/20/24  1550 05/20/24  1555   WBC 6.11 6.16  --    RBC 4.34* 4.40*  --    HGB 7.6* 7.5*  --    HCT 27.1* 28.2* 30*    265  --    MCV 62* 64*  --    MCH 17.5* 17.0*  --    MCHC 28.0* 26.6*  --      BNP:  Recent Labs   Lab 05/20/24  1422 05/20/24  1550   BNP 1,148* 1,168*     CMP:  Recent Labs   Lab 05/20/24  1422 05/20/24  1550   * 63*   CALCIUM 8.9 8.8   ALBUMIN 2.9* 3.0*   PROT 8.5* 8.5*   * 138   K 3.0* 3.3*   CO2 23 25    107   BUN 12 12   CREATININE 1.0 1.0   ALKPHOS 167* 170*   ALT 10 11   AST 23 23   BILITOT 1.9* 1.9*      Coagulation:   Recent Labs   Lab 05/20/24  1422 05/20/24  1550   INR 5.2* 5.4*     LDH:  Recent Labs   Lab 05/20/24  1422 05/20/24  1550    * 295*     Microbiology:  Microbiology Results (last 7 days)       Procedure Component Value Units Date/Time    Culture, Anaerobe [[id number]] Collected: 05/20/24 1653    Order Status: Sent Specimen: Wound from Abdomen Updated: 05/20/24 1714    Aerobic culture [[id number]] Collected: 05/20/24 1653    Order Status: Sent Specimen: Wound from Abdomen Updated: 05/20/24 1713    Gram stain [[id number]] Collected: 05/20/24 1653    Order Status: Sent Specimen: Wound from Abdomen Updated: 05/20/24 1713            I have reviewed all pertinent labs within the past 24 hours.    Diagnostic Results:  I have reviewed all pertinent imaging results/findings within the past 24 hours.    Assessment/Plan:     * Acute on chronic combined systolic and diastolic heart failure  -NICM s/p HM 3 on home Milrinone   -Last 2D Echo  9/5/23 : LVEF 10-15%, LVEDD  7.11 cm with speed at 5100  -Last RHC: 10/31/22 with speed at 5100 on Milrinone 0.125 RA: 18, RV: 35/7 (22), PAP: 35/19 (24), PWP: 21, CO: 3.55, CI: 1.6  -Hypervolemic on examination today. Started IV lasix 80mg tid. Cyclobenzaprine to be given w/ lasix to prevent cramping (historicaly not related to electrolytes)  -Picc line to be replaced by Picc team. Resume home milrinone 0.25  -GDMT with N/A  -2g Na dietary restriction, 1500 mL fluid restriction, strict I/Os    LVAD (left ventricular assist device) present  Procedure: Device Interrogation Including analysis of device parameters  Current Settings: Ventricular Assist Device  Review of device function is stable/unstable stable  Anticoagulation w/ coumadin. Goal INR 2-3. Supratherapeutic. Hold today.         5/20/2024     3:52 PM 4/27/2024    12:08 AM 4/26/2024     8:49 PM 4/26/2024     4:00 PM 4/26/2024    12:00 PM 4/26/2024     8:00 AM 4/26/2024     5:39 AM   TXP LVAD INTERROGATIONS   Type HeartMate3 HeartMate3 HeartMate3 HeartMate3 HeartMate3 HeartMate3    Flow 4.2 4.3 4.4 4.2 4.5 4.4 4.3   Speed 5100 5100 5100 5100 5100  5100 5100   PI 4.5 4.8 4.3 4.8 3.9 4.8 5   Power (Serna) 3.6 3.6 3.6 3.7 3.8 3.7 3.6   LSL    4700 4700 4700    Pulsatility No Pulse Intermittent pulse Intermittent pulse Pulse Pulse Pulse Intermittent pulse         Seizure-like activity  Continue home keppra.     Type 2 diabetes mellitus with hyperglycemia, with long-term current use of insulin  Management as per Endocrinology.     HTN (hypertension)  - continue amlodopine.         Chantal Herndon MD  Heart Transplant  [facility]

## 2024-05-20 NOTE — SUBJECTIVE & OBJECTIVE
Interval HPI:   Overnight events: No acute events overnight. Patient in room ED . Blood glucose stable. BG at and below goal on current insulin regimen (SSI, prandial, and basal insulin ). Steroid use- None.    Renal function- Normal   Vasopressors-  None       Endocrine will continue to follow and manage insulin orders inpatient.         Diet diabetic 2000 Calorie; Fluid - 1500mL     Eatin%  Nausea: No  Hypoglycemia and intervention: Yes, patient reports giving his meal time insulin and then not eating lunch since he had to come to the hospital. Resulting in lower blood glucose.   Fever: No  TPN and/or TF: No    PMH, PSH, FH, SH updated and reviewed     ROS:  Review of Systems   Constitutional:  Negative for unexpected weight change.   Eyes:  Negative for visual disturbance.   Respiratory:  Negative for cough.    Cardiovascular:  Negative for chest pain.   Gastrointestinal:  Negative for nausea and vomiting.   Endocrine: Negative for polydipsia and polyuria.   Musculoskeletal:  Negative for back pain.   Skin:  Negative for rash.   Neurological:  Negative for syncope.   Psychiatric/Behavioral:  Negative for agitation and dysphoric mood.        Current Medications and/or Treatments Impacting Glycemic Control  Immunotherapy:    Immunosuppressants       None          Steroids:   Hormones (From admission, onward)      None          Pressors:    Autonomic Drugs (From admission, onward)      None          Hyperglycemia/Diabetes Medications:   Antihyperglycemics (From admission, onward)      Start     Stop Route Frequency Ordered    24 0900  insulin glargine U-100 (Lantus) pen 10 Units         -- SubQ Daily 24 1722    24 2100  insulin glargine U-100 (Lantus) pen 10 Units         -- SubQ Nightly 24 1722    24 1822  insulin aspart U-100 pen 0-10 Units         -- SubQ Before meals & nightly PRN 24 1722             PHYSICAL EXAMINATION:  Vitals:    24 1653   BP:    Pulse:  93   Resp: 17   Temp:      Body mass index is 22.5 kg/m².     Physical Exam  Constitutional:       Appearance: He is well-developed.   HENT:      Head: Normocephalic.   Eyes:      Conjunctiva/sclera: Conjunctivae normal.   Cardiovascular:      Comments: LVAD hum  Pulmonary:      Effort: Pulmonary effort is normal.   Musculoskeletal:         General: Normal range of motion.   Skin:     General: Skin is warm.      Findings: No rash.   Neurological:      Mental Status: He is alert and oriented to person, place, and time.

## 2024-05-20 NOTE — ASSESSMENT & PLAN NOTE
-NICM s/p HM 3 on home Milrinone   -Last 2D Echo  9/5/23 : LVEF 10-15%, LVEDD  7.11 cm with speed at 5100  -Last RHC: 10/31/22 with speed at 5100 on Milrinone 0.125 RA: 18, RV: 35/7 (22), PAP: 35/19 (24), PWP: 21, CO: 3.55, CI: 1.6  -Hypervolemic on examination today. Started IV lasix 80mg tid. Cyclobenzaprine to be given w/ lasix to prevent cramping (historicaly not related to electrolytes)  -Picc line to be replaced by Picc team. Resume home milrinone 0.25  -GDMT with N/A  -2g Na dietary restriction, 1500 mL fluid restriction, strict I/Os

## 2024-05-20 NOTE — ASSESSMENT & PLAN NOTE
Endocrinology consulted for BG management.   BG goal 140-180    25% reduction in home regimen   - Lantus (Insulin Glargine) 15 units BID  - Novolog (Insulin Aspart) 14 units TIDWM and prn for BG excursions [facility] SSI (150/25)  - BG checks AC/HS  - Hypoglycemia protocol in place      ** Please notify Endocrine for any change and/or advance in diet**  ** Please call Endocrine for any BG related issues **    Discharge Planning:   TBD. Please notify endocrinology prior to discharge.

## 2024-05-20 NOTE — ED PROVIDER NOTES
Encounter Date: 5/20/2024       History     Chief Complaint   Patient presents with    LVAD     Sent from clinic for admission, has primacor infusing  LVAD COORD LIZBETH MEDELLIN NOTIFIED      HPI    39-year-old male with significant medical history of CHF, LVAD, dilated cardiomyopathy, AICD, hypertension, type 2 diabetes, seizure-like activity, presenting for shortness of breath.      Patient presents from the cardiology clinic for concerns of shortness of breath.  He notes several days of progressive worsening dyspnea.  He notes he can walk up to 5 steps before becomes acutely short of breath.  He also endorses 1 day of new dry cough.  He denies fever, chills, nausea, vomiting.  He also denies diarrhea, constipation or polyuria.  He denies orthopnea or worsened extremity swelling.    He also has concerns that his PICC line has increased resistance when trying to push medications.  He denies missing any of his scheduled medications.      Review of patient's allergies indicates:   Allergen Reactions    Bumex [bumetanide] Hives    Torsemide Hives     Past Medical History:   Diagnosis Date    Acute respiratory failure with hypoxia 07/22/2023    Arthritis     Awareness alteration, transient 09/01/2022    Cardiomyopathy     CHF (congestive heart failure) 10/01/2020    COVID-19 06/03/2023    Diabetes mellitus     Dilated cardiomyopathy 10/26/2020    Drug abuse 10/2020    Headache 04/19/2023    Hyperglycemia 12/16/2022    Hyperosmolar hyperglycemic state (HHS) 05/25/2022    ICD (implantable cardioverter-defibrillator) in place 10/26/2020    Infected defibrillator now s/p removal Nov 2023 07/23/2023    Left ventricular assist device (LVAD) complication, initial encounter 06/05/2023    Muscle cramping 06/15/2022    Renal disorder     SOB (shortness of breath) 06/13/2022    Tingling in extremities 07/13/2022     Past Surgical History:   Procedure Laterality Date    APPLICATION OF WOUND VACUUM-ASSISTED CLOSURE DEVICE N/A  6/30/2022    Procedure: APPLICATION, WOUND VAC;  Surgeon: Luis F Paige MD;  Location: [facility] OR 2ND FLR;  Service: Cardiovascular;  Laterality: N/A;  50 x 5 cm    CARDIAC DEFIBRILLATOR PLACEMENT      ECHOCARDIOGRAM,TRANSESOPHAGEAL  11/9/2023    Procedure: Transesophageal echo (GUILLERMO) intra-procedure log documentation;  Surgeon: Eron Ivy MD;  Location: [facility] EP LAB;  Service: Cardiology;;    EXTRACTION, ELECTRODE LEAD Left 11/9/2023    Procedure: EXTRACTION, ELECTRODE LEAD;  Surgeon: ADALID Leon MD;  Location: [facility] EP LAB;  Service: Cardiology;  Laterality: Left;  INFECTION, LEAD EXTRACTION, GUILLERMO, BSCI, ANES, EH,   *NO CTS BACKUP*    IMPLANTATION OF RIGHT VENTRICULAR ASSIST DEVICE (RVAD) N/A 6/29/2022    Procedure: INSERTION, RVAD;  Surgeon: Luis F Paige MD;  Location: [facility] OR 2ND FLR;  Service: Cardiovascular;  Laterality: N/A;    INSERTION OF GRAFT TO PERICARDIUM Right 6/30/2022    Procedure: INSERTION-RIGHT VENTRICULAR ASSIST DEVICE;  Surgeon: Luis F Paige MD;  Location: [facility] OR [facility] FLR;  Service: Cardiovascular;  Laterality: Right;    IRRIGATION OF MEDIASTINUM  6/30/2022    Procedure: IRRIGATION, MEDIASTINUM;  Surgeon: Luis F Paige MD;  [facility];  Service: Cardiovascular;;    LEFT VENTRICULAR ASSIST DEVICE Left 6/23/2022    Procedure: INSERTION-LEFT VENTRICULAR ASSIST DEVICE;  Surgeon: Luis F Paige MD;  Location: [facility] OR [facility] FLR;  Service: Cardiovascular;  Laterality: Left;    LEFT VENTRICULAR ASSIST DEVICE N/A 6/29/2022    Procedure: INSERTION-LEFT VENTRICULAR ASSIST DEVICE;  Surgeon: Luis F Paige MD;  Location: [facility] OR 2ND FLR;  Service: Cardiovascular;  Laterality: N/A;    REVISION OF SKIN POCKET FOR CARDIOVERTER-DEFIBRILLATOR  11/9/2023    Procedure: REVISION, SKIN POCKET, FOR CARDIOVERTER-DEFIBRILLATOR;  Surgeon: ADALID Leon MD;  Location: [facility] EP LAB;  Service: Cardiology;;    RIGHT HEART CATHETERIZATION Right 4/8/2022    Procedure: INSERTION, CATHETER,  RIGHT HEART;  Surgeon: Luca Lopez Jr., MD;  Location: [facility] CATH LAB;  Service: Cardiology;  Laterality: Right;    RIGHT HEART CATHETERIZATION Right 4/19/2022    Procedure: INSERTION, CATHETER, RIGHT HEART;  Surgeon: Josh Pulido MD;  Location: [facility] CATH LAB;  Service: Cardiology;  Laterality: Right;    RIGHT HEART CATHETERIZATION Right 7/21/2022    Procedure: INSERTION, CATHETER, RIGHT HEART;  Surgeon: Dalia Crum MD;  Location: [facility] CATH LAB;  Service: Cardiology;  Laterality: Right;    RIGHT HEART CATHETERIZATION Right 10/31/2022    Procedure: INSERTION, CATHETER, RIGHT HEART;  Surgeon: Dalia Crum MD;  Location: [facility] CATH LAB;  Service: Cardiology;  Laterality: Right;    STERNAL WOUND CLOSURE N/A 6/30/2022    Procedure: CLOSURE, WOUND, STERNUM;  Surgeon: Luis F Paige MD;  Location: [facility] OR [address] [place] [address];  Service: Cardiovascular;  Laterality: N/A;     Family History   Problem Relation Name Age of Onset    Heart failure Father      Diverticulosis Brother      Heart attack Maternal Grandmother      Heart attack Maternal Grandfather       Social History     Tobacco Use    Smoking status: Former     Current packs/day: 0.00     Average packs/day: 0.5 packs/day for 16.6 years (8.3 ttl pk-yrs)     Types: Cigarettes     Start date: 10/1/2004     Quit date: 4/23/2021     Years since quitting: 3.0    Smokeless tobacco: Never   Substance Use Topics    Alcohol use: Not Currently    Drug use: Not Currently     Types: Marijuana, MDMA (Ecstacy)     Review of Systems  See HPI for pertinent ROS.   Physical Exam     Initial Vitals   BP Pulse Resp Temp SpO2   05/20/24 1547 05/20/24 1513 05/20/24 1513 05/20/24 1513 05/20/24 1513   (!) 78/0 (!) 114 (!) 24 98.1 °F (36.7 °C) 96 %      MAP       --                Physical Exam    Constitutional: He appears well-developed and well-nourished.   HENT:   Head: Normocephalic and atraumatic.   Eyes: Pupils are equal, round, and reactive to light. No scleral icterus.    Neck: No JVD present.   Normal range of motion.  Cardiovascular:      Exam reveals no gallop and no friction rub.       No murmur heard.  Audible home from LVAD, difficult to discern heart sounds   Pulmonary/Chest: Breath sounds normal. No stridor. He has no wheezes. He has no rhonchi. He has no rales.   LVAD drive line on R lateral chest wall no erythema, swelling or drainage    Abdominal: Abdomen is soft. There is no abdominal tenderness. There is no rebound and no guarding.   Musculoskeletal:         General: No tenderness or edema.      Cervical back: Normal range of motion.      Comments: No LE edema     Neurological: He is alert. GCS score is 15. GCS eye subscore is 4. GCS verbal subscore is 5. GCS motor subscore is 6.   Skin: Skin is warm. Capillary refill takes less than 2 seconds.   Psychiatric: He has a normal mood and affect.         ED Course   Procedures  Labs Reviewed   CBC W/ AUTO DIFFERENTIAL - Abnormal; Notable for the following components:       Result Value    RBC 4.40 (*)     Hemoglobin 7.5 (*)     Hematocrit 28.2 (*)     MCV 64 (*)     MCH 17.0 (*)     MCHC 26.6 (*)     RDW 24.8 (*)     MPV 9.1 (*)     All other components within normal limits   COMPREHENSIVE METABOLIC PANEL - Abnormal; Notable for the following components:    Potassium 3.3 (*)     Glucose 63 (*)     Total Protein 8.5 (*)     Albumin 3.0 (*)     Total Bilirubin 1.9 (*)     Alkaline Phosphatase 170 (*)     Anion Gap 6 (*)     All other components within normal limits   B-TYPE NATRIURETIC PEPTIDE - Abnormal; Notable for the following components:    BNP 1,168 (*)     All other components within normal limits   LACTATE DEHYDROGENASE - Abnormal; Notable for the following components:     (*)     All other components within normal limits   PROTIME-INR - Abnormal; Notable for the following components:    Prothrombin Time 53.1 (*)     INR 5.4 (*)     All other components within normal limits    Narrative:      INR  critical  result(s) called and verbal readback obtained from   Afia Sequeira RN by RXP 05/20/2024 16:38   ISTAT PROCEDURE - Abnormal; Notable for the following components:    POC Glucose 62 (*)     POC Potassium 3.3 (*)     POC Hematocrit 30 (*)     All other components within normal limits   GRAM STAIN    Narrative:     VAD DLES   CULTURE, AEROBIC  (SPECIFY SOURCE)   CULTURE, ANAEROBIC   TROPONIN I   MAGNESIUM   URINALYSIS, REFLEX TO URINE CULTURE   ISTAT LACTATE   ISTAT CHEM8   POCT GLUCOSE MONITORING CONTINUOUS        ECG Results              EKG 12-lead (Final result)        Collection Time Result Time QRS Duration OHS QTC Calculation    05/20/24 15:19:15 05/20/24 16:27:09 180 485                     Final result by Interface, Lab In [facility] (05/20/24 16:27:17)                   Narrative:    Test Reason : Z95.811,    Vent. Rate : 114 BPM     Atrial Rate : 115 BPM     P-R Int : 032 ms          QRS Dur : 180 ms      QT Int : 352 ms       P-R-T Axes : 000 118 131 degrees     QTc Int : 485 ms    Sinus tachycardia with short ND with marked artifact  Right axis deviation  Nonspecific intraventricular block  Abnormal ECG  When compared with ECG of 20-APR-2024 02:32,  Significant changes have occurred  Confirmed by Hill ALLEN MD ([id number]) on 5/20/2024 4:27:07 PM    Referred By: [email or web address]   SELF           Confirmed By:Hill ALLEN MD                                  Imaging Results              X-Ray Chest 1 View S/P PICC Line by Nursing (Final result)  Result time 05/20/24 18:26:06      Final result by Abilio Garrett MD (05/20/24 18:26:06)                   Impression:      As above.      Electronically signed by: Abilio Garrett MD  Date:    05/20/2024  Time:    18:26               Narrative:    EXAMINATION:  XR CHEST 1 VIEW S/P PICC LINE BY NURSING    CLINICAL HISTORY:  picc;    TECHNIQUE:  Frontal view of the chest    COMPARISON:  Chest radiograph 04/20/2024    FINDINGS:  Monitoring leads overlie the chest.    Left-sided PICC tip  overlies the mid to distal SVC.  Cardiomediastinal silhouette is midline and stable.  Previous right-sided PICC overlying the lateral right upper lung zone is no longer identified.  No pneumothorax or new focal opacity.  Similar blunting of the left costophrenic angle.  Sternotomy wires and LVAD unchanged.  No acute osseous process seen.                                       Medications   acetaminophen tablet 650 mg (has no administration in time range)   amLODIPine tablet 5 mg (has no administration in time range)   aspirin EC tablet 81 mg (has no administration in time range)   atorvastatin tablet 40 mg (has no administration in time range)   cefadroxil capsule 500 mg (500 mg Oral Given 5/20/24 2131)   magnesium oxide tablet 400 mg (has no administration in time range)   levETIRAcetam tablet 1,500 mg (1,500 mg Oral Given 5/20/24 2131)   mirtazapine tablet 15 mg (15 mg Oral Given 5/20/24 2131)   pantoprazole EC tablet 40 mg (has no administration in time range)   spironolactone tablet 25 mg (has no administration in time range)   milrinone 20mg in D5W 100 mL infusion (0.25 mcg/kg/min × 94.4 kg Intravenous New Bag 5/20/24 1813)   sodium chloride 0.9% flush 10 mL (0 mLs Intravenous Hold 5/20/24 1800)     And   sodium chloride 0.9% flush 10 mL (has no administration in time range)   furosemide injection 80 mg (80 mg Intravenous Given 5/20/24 2131)   cyclobenzaprine tablet 5 mg (has no administration in time range)   glucose chewable tablet 16 g (has no administration in time range)   glucose chewable tablet 24 g (has no administration in time range)   glucagon (human recombinant) injection 1 mg (has no administration in time range)   insulin aspart U-100 pen 0-10 Units (1 Units Subcutaneous Given 5/20/24 2134)   dextrose 10% bolus 125 mL 125 mL (has no administration in time range)   dextrose 10% bolus 250 mL 250 mL (has no administration in time range)   insulin glargine U-100 (Lantus) pen 15 Units (has no  administration in time range)   insulin glargine U-100 (Lantus) pen 15 Units (15 Units Subcutaneous Given 5/20/24 2132)   insulin aspart U-100 pen 14 Units (has no administration in time range)   potassium bicarbonate disintegrating tablet 25 mEq (25 mEq Oral Given 5/20/24 1656)   potassium chloride CR capsule 50 mEq (50 mEq Oral Given 5/20/24 1656)   magnesium sulfate 2g in water 50mL IVPB (premix) (0 g Intravenous Stopped 5/20/24 1856)     Medical Decision Making  Risk  Prescription drug management.  Decision regarding hospitalization.               ED Course as of 05/21/24 0015   Tue May 21, 2024   0013 ISTAT PROCEDURE(!) [KB]   0013 Potassium, Blood Gas(!): 3.3  Mild hypokalemia, we will replace with replacement potassium [KB]   0013 CBC auto differential(!)  Significant microcytic anemia, however similar to baseline in just above transfusion threshold.  We will defer transfusion for now.  No leukocytosis, no thrombocytopenia. [KB]   0013 Comprehensive metabolic panel(!)  Mild hypokalemia, we will replace with potassium, no ROC, no evidence of hepatobiliary obstruction. [KB]   0014 X-Ray Chest 1 View S/P PICC Line by Nursing  Similar blunting of left costophrenic angle on my personal evaluation of the chest x-ray.  No evidence of acute focal pneumonia or pneumothorax. [KB]      ED Course User Index  [KB] Emma Porter MD                       Sinus tachycardia, rate 114, right axis deviation, no QTC prolongation or ST segment elevation    39-year-old male described as above with LVAD in place presenting for dyspnea and new onset cough.  On presentation, cuff pressure 78/0 on LVAD, he denies LVAD alarms or low-flow alarms.  He has not appear to be in significant respiratory duress.  His lungs are clear to auscultation, no evidence of significant lower extremity edema.    Labs notable for elevated BNP, significantly elevated from previous concerning for fluid overload.  INR also elevated but elevated INR goals in  LVAD.  Lactate negative, sepsis less likely, and anemia similar to previous but not at transfusion threshold.  Cardiology consulted and recommended admission.        Clinical Impression:  Final diagnoses:  [Z95.811] LVAD (left ventricular assist device) present  [R06.02] Shortness of breath          ED Disposition Condition    Admit                 Emma Porter MD  Resident  05/21/24 0015

## 2024-05-20 NOTE — FIRST PROVIDER EVALUATION
"Medical screening examination initiated.  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 He underwent RVAD removal and chest closure 6/30/2022.  He also completed a course of IV Abx for staph epi. He was weaned off  but he had to restarted due to RVF. He was eventually transitioned to milrinone (secondary to  shortage) now on 0.25 mcg/kg/min. He was sent to the ED for admission.     Vitals:    05/20/24 1513   BP: Comment: not reg   Resp: (!) 24   Temp: 98.1 °F (36.7 °C)   TempSrc: Oral   Weight: 81.6 kg (180 lb)   Height: 6' 3" (1.905 m)       Pertinent physical exam:  LVAD hum, LVAD in place, in w/c, on milrinone drip.     Brief workup plan:  labs, HTS eval    Preliminary workup initiated; this workup will be continued and followed by the physician or advanced practice provider that is assigned to the patient when roomed.    Aries Chamberlain DO, RAYA  Emergency Staff Physician   Dept of Emergency Medicine   [facility]  Spectralink: [id number]        Disclaimer: This note has been generated using voice-recognition software. There may be typographical errors that have been missed during proof-reading.    "

## 2024-05-20 NOTE — HPI
Reason for Consult: Management of T2DM, Hyperglycemia      Surgical Procedure and Date: LVAD 06/29/2022     Diabetes diagnosis year: 2022     Home Diabetes Medications:   -Levemir 20 units BID   -Novolog 18 units TIDWM in addition to the following correction scale:    150 - 200 + 2 unit    201 - 250 + 4 units    251 - 300 + 6 units    301 - 350 + 8 units       > 350   + 10 units     How often checking glucose at home?  > 4 times per day  BG readings on regimen: 180's-200's  Hypoglycemia on the regimen?  No  Missed doses on regimen?  occasionally skips mealsn and will skip Novolog with breakfast      Diabetes Complications include:     Hyperglycemia     Complicating diabetes co morbidities:   History of MI, CHF, and CKD

## 2024-05-20 NOTE — ASSESSMENT & PLAN NOTE
Procedure: Device Interrogation Including analysis of device parameters  Current Settings: Ventricular Assist Device  Review of device function is stable/unstable stable  Anticoagulation w/ coumadin. Goal INR 2-3. Supratherapeutic. Hold today.         5/20/2024     3:52 PM 4/27/2024    12:08 AM 4/26/2024     8:49 PM 4/26/2024     4:00 PM 4/26/2024    12:00 PM 4/26/2024     8:00 AM 4/26/2024     5:39 AM   TXP LVAD INTERROGATIONS   Type HeartMate3 HeartMate3 HeartMate3 HeartMate3 HeartMate3 HeartMate3    Flow 4.2 4.3 4.4 4.2 4.5 4.4 4.3   Speed 5100 5100 5100 5100 5100 5100 5100   PI 4.5 4.8 4.3 4.8 3.9 4.8 5   Power (Serna) 3.6 3.6 3.6 3.7 3.8 3.7 3.6   LSL    4700 4700 4700    Pulsatility No Pulse Intermittent pulse Intermittent pulse Pulse Pulse Pulse Intermittent pulse

## 2024-05-20 NOTE — ED TRIAGE NOTES
Pt sent down from clinic for admission.  Pt has LVAD and on Primacor but states PICC line quit working last night.  Pt states he is being admitted for excess fluid. Pt c/o worse than normal SOB and drainage from drive line site that began 3 days ago.  Pt denies alarms on LVAD

## 2024-05-20 NOTE — SUBJECTIVE & OBJECTIVE
Past Medical History:   Diagnosis Date    Acute respiratory failure with hypoxia 07/22/2023    Arthritis     Awareness alteration, transient 09/01/2022    Cardiomyopathy     CHF (congestive heart failure) 10/01/2020    COVID-19 06/03/2023    Diabetes mellitus     Dilated cardiomyopathy 10/26/2020    Drug abuse 10/2020    Headache 04/19/2023    Hyperglycemia 12/16/2022    Hyperosmolar hyperglycemic state (HHS) 05/25/2022    ICD (implantable cardioverter-defibrillator) in place 10/26/2020    Infected defibrillator now s/p removal Nov 2023 07/23/2023    Left ventricular assist device (LVAD) complication, initial encounter 06/05/2023    Muscle cramping 06/15/2022    Renal disorder     SOB (shortness of breath) 06/13/2022    Tingling in extremities 07/13/2022       Past Surgical History:   Procedure Laterality Date    APPLICATION OF WOUND VACUUM-ASSISTED CLOSURE DEVICE N/A 6/30/2022    Procedure: APPLICATION, WOUND VAC;  Surgeon: Luis F Paige MD;  Location: [facility] OR [address] [place] [address];  Service: Cardiovascular;  Laterality: N/A;  50 x 5 cm    CARDIAC DEFIBRILLATOR PLACEMENT      ECHOCARDIOGRAM,TRANSESOPHAGEAL  11/9/2023    Procedure: Transesophageal echo (GUILLERMO) intra-procedure log documentation;  Surgeon: Eron Ivy MD;  Location: [facility] EP LAB;  Service: Cardiology;;    EXTRACTION, ELECTRODE LEAD Left 11/9/2023    Procedure: EXTRACTION, ELECTRODE LEAD;  Surgeon: ADALID Leon MD;  Location: [facility] EP LAB;  Service: Cardiology;  Laterality: Left;  INFECTION, LEAD EXTRACTION, GUILLERMO, BSCI, ANES, EH,   *NO CTS BACKUP*    IMPLANTATION OF RIGHT VENTRICULAR ASSIST DEVICE (RVAD) N/A 6/29/2022    Procedure: INSERTION, RVAD;  Surgeon: Luis F Paige MD;  Location: [facility] OR [address] [place] [address];  Service: Cardiovascular;  Laterality: N/A;    INSERTION OF GRAFT TO PERICARDIUM Right 6/30/2022    Procedure: INSERTION-RIGHT VENTRICULAR ASSIST DEVICE;  Surgeon: Luis F Paige MD;  Location: [facility] OR [address] [place] [address];  Service: Cardiovascular;  Laterality:  Right;    IRRIGATION OF MEDIASTINUM  6/30/2022    Procedure: IRRIGATION, MEDIASTINUM;  Surgeon: Luis F Paige MD;  [facility];  Service: Cardiovascular;;    LEFT VENTRICULAR ASSIST DEVICE Left 6/23/2022    Procedure: INSERTION-LEFT VENTRICULAR ASSIST DEVICE;  Surgeon: Luis F Paige MD;  [facility];  Service: Cardiovascular;  Laterality: Left;    LEFT VENTRICULAR ASSIST DEVICE N/A 6/29/2022    Procedure: INSERTION-LEFT VENTRICULAR ASSIST DEVICE;  Surgeon: Luis F Paige MD;  [facility];  Service: Cardiovascular;  Laterality: N/A;    REVISION OF SKIN POCKET FOR CARDIOVERTER-DEFIBRILLATOR  11/9/2023    Procedure: REVISION, SKIN POCKET, FOR CARDIOVERTER-DEFIBRILLATOR;  Surgeon: ADALID Leon MD;  Location: [facility] EP LAB;  Service: Cardiology;;    RIGHT HEART CATHETERIZATION Right 4/8/2022    Procedure: INSERTION, CATHETER, RIGHT HEART;  Surgeon: Luca Lopez Jr., MD;  Location: [facility] CATH LAB;  Service: Cardiology;  Laterality: Right;    RIGHT HEART CATHETERIZATION Right 4/19/2022    Procedure: INSERTION, CATHETER, RIGHT HEART;  Surgeon: Josh Pulido MD;  Location: [facility] CATH LAB;  Service: Cardiology;  Laterality: Right;    RIGHT HEART CATHETERIZATION Right 7/21/2022    Procedure: INSERTION, CATHETER, RIGHT HEART;  Surgeon: Dalia Crum MD;  Location: [facility] CATH LAB;  Service: Cardiology;  Laterality: Right;    RIGHT HEART CATHETERIZATION Right 10/31/2022    Procedure: INSERTION, CATHETER, RIGHT HEART;  Surgeon: Dalia Crum MD;  Location: [facility] CATH LAB;  Service: Cardiology;  Laterality: Right;    STERNAL WOUND CLOSURE N/A 6/30/2022    Procedure: CLOSURE, WOUND, STERNUM;  Surgeon: Luis F Paige MD;  [facility];  Service: Cardiovascular;  Laterality: N/A;       Review of patient's allergies indicates:   Allergen Reactions    Bumex [bumetanide] Hives    Torsemide Hives       Current Facility-Administered Medications   Medication     acetaminophen tablet 650 mg    [START ON 5/21/2024] amLODIPine tablet 5 mg    [START ON 5/21/2024] aspirin EC tablet 81 mg    [START ON 5/21/2024] atorvastatin tablet 40 mg    cefadroxil capsule 500 mg    cyclobenzaprine tablet 5 mg    furosemide injection 80 mg    levETIRAcetam tablet 1,500 mg    [START ON 5/21/2024] magnesium oxide tablet 400 mg    magnesium sulfate 2g in water 50mL IVPB (premix)    milrinone 20mg in D5W 100 mL infusion    mirtazapine tablet 15 mg    [START ON 5/21/2024] pantoprazole EC tablet 40 mg    [START ON 5/21/2024] spironolactone tablet 25 mg     Current Outpatient Medications   Medication Sig    acetaminophen (TYLENOL) 325 MG tablet Take 2 tablets (650 mg total) by mouth every 6 (six) hours as needed for Pain.    amLODIPine (NORVASC) 5 MG tablet Take 1 tablet (5 mg total) by mouth once daily.    aspirin (ECOTRIN) 81 MG EC tablet Take 1 tablet (81 mg total) by mouth once daily.    atorvastatin (LIPITOR) 40 MG tablet Take 1 tablet (40 mg total) by mouth once daily.    blood sugar diagnostic Strp Use to test blood glucose 4 (four) times daily.    blood-glucose meter (TRUE METRIX GLUCOSE METER) Misc Use to test blood glucose 4 (four) times daily.    blood-glucose sensor (FREESTYLE LUCIUS 3 SENSOR) Dee 1 Device by Misc.(Non-Drug; Combo Route) route every 14 (fourteen) days.    cefadroxil (DURICEF) 500 MG Cap Take 1 capsule (500 mg total) by mouth every 12 (twelve) hours.    furosemide (LASIX) 80 MG tablet Take 1 tablet (80 mg total) by mouth once daily.    glucagon (BAQSIMI) 3 mg/actuation Spry 1 each by Nasal route as needed (hypoglycemia).    insulin aspart U-100 (NOVOLOG) 100 unit/mL (3 mL) InPn pen Inject 18 Units into the skin 3 (three) times daily with meals: MAX 94 units/daily  150-200    201-250    251-300    301-350    >350  +2 units   +4 units    +6 units    +8 units    +10 units    insulin detemir U-100, Levemir, 100 unit/mL (3 mL) SubQ InPn pen Inject 20 Units into the skin 2 (two)  "times daily. In the morning and before bed.    lancets 33 gauge Misc Use to test blood glucose 4 (four) times daily.    levETIRAcetam (KEPPRA) 1000 MG tablet Take 1.5 tablets (1,500 mg total) by mouth 2 (two) times daily.    magnesium oxide (MAG-OX) 400 mg (241.3 mg magnesium) tablet Take 1 tablet (400 mg total) by mouth once daily.    milrinone 20mg/100ml D5W, 200mcg/ml, (PRIMACOR) 20 mg/100 mL (200 mcg/mL) infusion Inject 23.6 mcg/min into the vein continuous.    mirtazapine (REMERON) 15 MG tablet Take 1 tablet (15 mg total) by mouth nightly.    ondansetron (ZOFRAN-ODT) 4 MG TbDL Take 1 tablet (4 mg total) by mouth every 8 (eight) hours as needed (nausea).    pantoprazole (PROTONIX) 40 MG tablet Take 1 tablet (40 mg total) by mouth once daily.    pen needle, diabetic 32 gauge x 5/32" Ndle 1 each by Misc.(Non-Drug; Combo Route) route 4 (four) times daily.    potassium chloride SA (K-DUR,KLOR-CON M) 10 MEQ tablet Take 3 tablets (30 mEq total) by mouth once daily.    spironolactone (ALDACTONE) 25 MG tablet Take 1 tablet (25 mg total) by mouth once daily.    warfarin (COUMADIN) 3 MG tablet Take 1.5 tablets (4.5mg) orally daily, except 1 tablet (3mg) on Sundays and Fridays     Family History       Problem Relation (Age of Onset)    Diverticulosis Brother    Heart attack Maternal Grandmother, Maternal Grandfather    Heart failure Father          Tobacco Use    Smoking status: Former     Current packs/day: 0.00     Average packs/day: 0.5 packs/day for 16.6 years (8.3 ttl pk-yrs)     Types: Cigarettes     Start date: 10/1/2004     Quit date: 4/23/2021     Years since quitting: 3.0    Smokeless tobacco: Never   Substance and Sexual Activity    Alcohol use: Not Currently    Drug use: Not Currently     Types: Marijuana, MDMA (Ecstacy)    Sexual activity: Yes     Partners: Female     Birth control/protection: None     Review of [id number] point ROS negative except for HPI  Objective:     Vital Signs (Most Recent):  Temp: 98.1 " °F (36.7 °C) (05/20/24 1513)  Pulse: 93 (05/20/24 1653)  Resp: 17 (05/20/24 1653)  BP: (!) 78/0 (05/20/24 1547)  SpO2: 95 % (05/20/24 1653) Vital Signs (24h Range):  Temp:  [98.1 °F (36.7 °C)] 98.1 °F (36.7 °C)  Pulse:  [] 93  Resp:  [17-24] 17  SpO2:  [95 %-100 %] 95 %  BP: (78)/(0) 78/0     Patient Vitals for the past 72 hrs (Last 3 readings):   Weight   05/20/24 1513 81.6 kg (180 lb)     Body mass index is 22.5 kg/m².    No intake or output data in the 24 hours ending 05/20/24 1714       Physical Exam  Constitutional:       General: He is not in acute distress.     Appearance: Normal appearance.   HENT:      Head: Normocephalic and atraumatic.   Eyes:      Conjunctiva/sclera: Conjunctivae normal.   Neck:      Vascular: JVD present.      Comments: JVP to the 18cm of H20  Cardiovascular:      Comments: VAD hum appreciated  Pulmonary:      Breath sounds: Normal breath sounds.      Comments: Clear to auscultation  Abdominal:      Comments: Soft, non-tender, non-distended   Musculoskeletal:      Cervical back: Normal range of motion.      Comments: Bilateral LE pitting edema to the knees.    Skin:     General: Skin is warm and dry.      Capillary Refill: Capillary refill takes less than 2 seconds.   Neurological:      Mental Status: He is alert and oriented to person, place, and time.   Psychiatric:         Mood and Affect: Mood and affect normal.            Significant Labs:  CBC:  Recent Labs   Lab 05/20/24  1422 05/20/24  1550 05/20/24  1555   WBC 6.11 6.16  --    RBC 4.34* 4.40*  --    HGB 7.6* 7.5*  --    HCT 27.1* 28.2* 30*    265  --    MCV 62* 64*  --    MCH 17.5* 17.0*  --    MCHC 28.0* 26.6*  --      BNP:  Recent Labs   Lab 05/20/24  1422 05/20/24  1550   BNP 1,148* 1,168*     CMP:  Recent Labs   Lab 05/20/24  1422 05/20/24  1550   * 63*   CALCIUM 8.9 8.8   ALBUMIN 2.9* 3.0*   PROT 8.5* 8.5*   * 138   K 3.0* 3.3*   CO2 23 25    107   BUN 12 12   CREATININE 1.0 1.0   ALKPHOS  167* 170*   ALT 10 11   AST 23 23   BILITOT 1.9* 1.9*      Coagulation:   Recent Labs   Lab 05/20/24  1422 05/20/24  1550   INR 5.2* 5.4*     LDH:  Recent Labs   Lab 05/20/24  1422 05/20/24  1550   * 295*     Microbiology:  Microbiology Results (last 7 days)       Procedure Component Value Units Date/Time    Culture, Anaerobe [[id number]] Collected: 05/20/24 1653    Order Status: Sent Specimen: Wound from Abdomen Updated: 05/20/24 1714    Aerobic culture [[id number]] Collected: 05/20/24 1653    Order Status: Sent Specimen: Wound from Abdomen Updated: 05/20/24 1713    Gram stain [[id number]] Collected: 05/20/24 1653    Order Status: Sent Specimen: Wound from Abdomen Updated: 05/20/24 1713            I have reviewed all pertinent labs within the past 24 hours.    Diagnostic Results:  I have reviewed all pertinent imaging results/findings within the past 24 hours.

## 2024-05-20 NOTE — ED NOTES
I-STAT Chem-8+ Results:   Value Reference Range   Sodium 140 136-145 mmol/L   Potassium  3.3 3.5-5.1 mmol/L   Chloride 103  mmol/L   Ionized Calcium 1.17 1.06-1.42 mmol/L   CO2 (measured) 23 23-29 mmol/L   Glucose 62  mg/dL   BUN 11 6-30 mg/dL   Creatinine 1.0 0.5-1.4 mg/dL   Hematocrit 30 36-54%

## 2024-05-20 NOTE — PROCEDURES
"Kevan Queen is a 39 y.o. male patient.    Temp: 98.1 °F (36.7 °C) (05/20/24 1513)  Pulse: 93 (05/20/24 1653)  Resp: 17 (05/20/24 1653)  BP: (!) 78/0 (05/20/24 1547)  SpO2: 95 % (05/20/24 1653)  Weight: 81.6 kg (180 lb) (05/20/24 1513)  Height: 6' 3" (190.5 cm) (05/20/24 1513)    PICC  Date/Time: 5/20/2024 5:13 PM  Performed by: Luis Vaca RN  Assisting provider: Ofelia Bills LPN  Consent Done: Yes  Time out: Immediately prior to procedure a time out was called to verify the correct patient, procedure, equipment, support staff and site/side marked as required  Indications: med administration and vascular access  Anesthesia: local infiltration  Local anesthetic: lidocaine 1% without epinephrine  Anesthetic Total (mL): 3  Description of findings: picc  Preparation: skin prepped with ChloraPrep  Skin prep agent dried: skin prep agent completely dried prior to procedure  Sterile barriers: all five maximum sterile barriers used - cap, mask, sterile gown, sterile gloves, and large sterile sheet  Hand hygiene: hand hygiene performed prior to central venous catheter insertion  Location details: left brachial  Catheter type: double lumen  Catheter size: 5 Fr  Catheter Length: 40cm    Ultrasound guidance: yes  Vessel Caliber: medium and patent, compressibility normal  Vascular Doppler: not done  Needle advanced into vessel with real time Ultrasound guidance.  Guidewire confirmed in vessel.  Image recorded and saved.  Sterile sheath used.  no esophageal manometryNumber of attempts: 1  Post-procedure: blood return through all ports, sterile dressing applied and chlorhexidine patch  Technical procedures used: 3CG  Specimens: No  Implants: No  Assessment: placement verified by x-ray  Complications: none          Name Ofelia Bills LPN  5/20/2024    "

## 2024-05-20 NOTE — PROGRESS NOTES
Patient was to be seen in clinic but was sent directly to the ER by myself and Dr. Villatoro.     Patient is hunched over in the wheelchair c/o SOB and left arm/shoulder pain. Patient states that he can maybe walk 5 steps and has to sit because he is short of breath. The left arm/shoulder pain is chronic. He states it has been there and he has had some bleeding from his PICC line which is in his KELLY.     Notified ER Fellow, Feliz Chan, ER Charge, Marco, Dr. Mralo Marques, and Cony Mahoney RN, VC on call.

## 2024-05-20 NOTE — CONSULTS
D/L PICC placed in left brachial vein, 40 cm in length with 0 cm exposed. Arm circumference 32 cm. Lot# [id number]

## 2024-05-20 NOTE — HPI
Mr. 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 He underwent RVAD removal and chest closure 6/30/2022.  He also completed a course of IV Abx for staph epi. He was weaned off  but he had to restarted due to RVF. He was eventually transitioned to milrinone (secondary to  shortage) now on 0.25 mcg/kg/min. He has a history of unclear syncope vs seizures and is followed by neuro for this and is on Keppra.       On 11/15/23 underwent removal of eroded [employer] scICD--no Lifevest (due to LVAD) and no plan to replace ICD as not requiring therapy post LVAD with concern for reinfection.  He has not had neurology f/u with seizure hx on keppra so coordinator to assist him with obtaining appt.    Patient presented today for routine HTS clinic appointment but was found to hunched over in a chair w/ marked dyspnea. Patient sent directly to the ED for further evaluation. States Picc line stopped working last night. Patient currently endorses chronic SOB w.exertion, but denies any SOB at rest, orthopnea, chest pain, fever, chills, nausea, vomiting, abdominal pain, abd distention, PND. States instead of take Lasix 80mg daily, he was taking Lasix 40mg bid.     Of note, patient was most recently hospitalized from 4/20-4/26 for ADHF and hyperglycemia. patient noted to be hyperglycemia w/ glucose>500 and was started on insulin drip and then switched to basal bolus dosing as per Endocrinology once hyperglycemia resolved. Patient was also started on IV lasix for ADHF. Patient throughout the hospital course had difficulty taking lasix due to extreme cramping not related to electrolyte de-arrangement. Discussion was had with patient on whether he  would rather switch to hospice care patient stated he would rather live with the discomfort associated w/ lasix. At the time of discharge patient was maintaining euvolemia w/ lasix PO 80mg daily.    Home Medications:   amlodipine besylate 5 mg Oral Daily  aspirin 81 mg Oral Daily  atorvastatin calcium 40 mg Oral Daily  cefadroxil 500 mg Oral Every 12 hours  cyclobenzaprine HCl 5 mg Oral 3 times daily PRN  furosemide 80 mg Oral Daily  Insulin detemir 20 units bid  Insulin aspart 18units tid  Keppra 1500mg bid  magnesium oxide 400 mg Oral Daily  milrinone lactate,milrinone lactate/D5W 0.25 mcg/kg/min (23.6 mcg/min) Intravenous Continuous  mirtazapine 15 mg Oral Nightly  pantoprazole sodium 40 mg Oral Daily  potassium chloride 10 MEQ 30 mEq Oral Daily  spironolactone 25 mg Oral Daily  warfarin sodium 3 MG Take 1.5 tablets (4.5mg) orally daily, except 1 tablet (3mg) on Sundays and Fridays        Cardiac Imaging:   Echo (9/2/2023): HM3 5100. AV does not open. IV septum midline. LV sevewrely dilated w/ normal ventricular mass, normal wall thickness, normal wall motion, severely reduced systolic function w/ EF 10-15%, and normal diastolic function. RV normal cavity size w/ normal wall thickness, normal wall motion, and normal systolic function. Mild MR. Mod to severe TR. PA sys pressure 38mmHg. LVIDd 7.11cm.     RHC (10/31/2022): RA 18, PWP 21, CO 3.55, CI 1.6. On mil 0.125 and HM3 5100.

## 2024-05-20 NOTE — ED NOTES
Patient identifiers verified and correct for Kevan Queen  LOC: The patient is awake, alert and aware of environment with an appropriate affect, the patient is oriented x 3 and speaking appropriately.   APPEARANCE: Patient appears comfortable and in no acute distress, patient is clean and well groomed.  SKIN: The skin is warm and dry, color consistent with ethnicity, patient has normal skin turgor and moist mucus membranes, skin intact, no breakdown or bruising noted.  Skin tear x 2 noted under anchor dressin  RESPIRATORY: Airway is open and patent, respirations are spontaneous, patient has a normal effort and rate, no accessory muscle.  CARDIAC: Pt placed on cardiac monitor. Patient has a normal rate and regular rhythm, no edema noted, capillary refill < 3 seconds.   GASTRO: Soft and non tender to palpation, no distention noted.  Drive line insertion site noted to RLQ, drainage noted on dressing, anchor dressing pulling off skin.   : Pt denies any pain or frequency with urination.  NEURO: Pt opens eyes spontaneously, behavior appropriate to situation, follows commands, facial expression symmetrical, bilateral hand grasp equal and even, purposeful motor response noted, normal sensation in all extremities when touched with a finger.

## 2024-05-20 NOTE — CONSULTS
[facility] - Emergency Dept  Endocrinology  Diabetes Consult Note    Consult Requested by: Marlo Marques MD   Reason for admit: Acute on chronic combined systolic and diastolic heart failure    HISTORY OF PRESENT ILLNESS:  Reason for Consult: Management of T2DM, Hyperglycemia      Surgical Procedure and Date: LVAD 2022     Diabetes diagnosis year:      Home Diabetes Medications:   -Levemir 20 units BID   -Novolog 18 units TIDWM in addition to the following correction scale:    150 - 200 + 2 unit    201 - 250 + 4 units    251 - 300 + 6 units    301 - 350 + 8 units       > 350   + 10 units     How often checking glucose at home?  > 4 times per day  BG readings on regimen: 180's-200's  Hypoglycemia on the regimen?  No  Missed doses on regimen?  occasionally skips mealsn and will skip Novolog with breakfast      Diabetes Complications include:     Hyperglycemia     Complicating diabetes co morbidities:   History of MI, CHF, and CKD       Interval HPI:   Overnight events: No acute events overnight. Patient in room ED . Blood glucose stable. BG at and below goal on current insulin regimen (SSI, prandial, and basal insulin ). Steroid use- None.    Renal function- Normal   Vasopressors-  None       Endocrine will continue to follow and manage insulin orders inpatient.         Diet diabetic 2000 Calorie; Fluid - 1500mL     Eatin%  Nausea: No  Hypoglycemia and intervention: Yes, patient reports giving his meal time insulin and then not eating lunch since he had to come to the hospital. Resulting in lower blood glucose.   Fever: No  TPN and/or TF: No    PMH, PSH, FH, SH updated and reviewed     ROS:  Review of Systems   Constitutional:  Negative for unexpected weight change.   Eyes:  Negative for visual disturbance.   Respiratory:  Negative for cough.    Cardiovascular:  Negative for chest pain.   Gastrointestinal:  Negative for nausea and vomiting.   Endocrine: Negative for polydipsia and polyuria.  "  Musculoskeletal:  Negative for back pain.   Skin:  Negative for rash.   Neurological:  Negative for syncope.   Psychiatric/Behavioral:  Negative for agitation and dysphoric mood.        Current Medications and/or Treatments Impacting Glycemic Control  Immunotherapy:    Immunosuppressants       None          Steroids:   Hormones (From admission, onward)      None          Pressors:    Autonomic Drugs (From admission, onward)      None          Hyperglycemia/Diabetes Medications:   Antihyperglycemics (From admission, onward)      Start     Stop Route Frequency Ordered    05/21/24 0900  insulin glargine U-100 (Lantus) pen 10 Units         -- SubQ Daily 05/20/24 1722    05/20/24 2100  insulin glargine U-100 (Lantus) pen 10 Units         -- SubQ Nightly 05/20/24 1722    05/20/24 1822  insulin aspart U-100 pen 0-10 Units         -- SubQ Before meals & nightly PRN 05/20/24 1722             PHYSICAL EXAMINATION:  Vitals:    05/20/24 1653   BP:    Pulse: 93   Resp: 17   Temp:      Body mass index is 22.5 kg/m².     Physical Exam  Constitutional:       Appearance: He is well-developed.   HENT:      Head: Normocephalic.   Eyes:      Conjunctiva/sclera: Conjunctivae normal.   Cardiovascular:      Comments: LVAD hum  Pulmonary:      Effort: Pulmonary effort is normal.   Musculoskeletal:         General: Normal range of motion.   Skin:     General: Skin is warm.      Findings: No rash.   Neurological:      Mental Status: He is alert and oriented to person, place, and time.            Labs Reviewed and Include   Recent Labs   Lab 05/20/24  1550   GLU 63*   CALCIUM 8.8   ALBUMIN 3.0*   PROT 8.5*      K 3.3*   CO2 25      BUN 12   CREATININE 1.0   ALKPHOS 170*   ALT 11   AST 23   BILITOT 1.9*     Lab Results   Component Value Date    WBC 6.16 05/20/2024    HGB 7.5 (L) 05/20/2024    HCT 30 (L) 05/20/2024    MCV 64 (L) 05/20/2024     05/20/2024     No results for input(s): "TSH", "FREET4" in the last 168 " "hours.  Lab Results   Component Value Date    HGBA1C >14.0 (H) 04/26/2024       Nutritional status:   Body mass index is 22.5 kg/m².  Lab Results   Component Value Date    ALBUMIN 3.0 (L) 05/20/2024    ALBUMIN 2.9 (L) 05/20/2024    ALBUMIN 2.8 (L) 04/26/2024     Lab Results   Component Value Date    PREALBUMIN 8 (L) 05/20/2024    PREALBUMIN 12 (L) 04/26/2024    PREALBUMIN 13 (L) 04/24/2024       Estimated Creatinine Clearance: 114.5 mL/min (based on SCr of 1 mg/dL).    Accu-Checks  No results for input(s): "POCTGLUCOSE" in the last 72 hours.     ASSESSMENT and PLAN    Cardiac/Vascular  * Acute on chronic combined systolic and diastolic heart failure  Managed per primary team  Avoid hypoglycemia        LVAD (left ventricular assist device) present  Managed per primary team  Avoid hypoglycemia        Endocrine  Type 2 diabetes mellitus with hyperglycemia, with long-term current use of insulin  Endocrinology consulted for BG management.   BG goal 140-180    25% reduction in home regimen   - Lantus (Insulin Glargine) 15 units BID  - Novolog (Insulin Aspart) 14 units TIDWM and prn for BG excursions [facility] SSI (150/25)  - BG checks AC/HS  - Hypoglycemia protocol in place      ** Please notify Endocrine for any change and/or advance in diet**  ** Please call Endocrine for any BG related issues **    Discharge Planning:   TBD. Please notify endocrinology prior to discharge.            Plan discussed with patient, family, and RN at bedside.        Michael Wyman, DNP, FNP  Endocrinology  [facility] - Emergency Dept  "

## 2024-05-21 PROBLEM — E87.6 HYPOKALEMIA: Status: ACTIVE | Noted: 2024-05-21

## 2024-05-21 LAB
ALBUMIN SERPL BCP-MCNC: 2.8 G/DL (ref 3.5–5.2)
ALP SERPL-CCNC: 163 U/L (ref 55–135)
ALT SERPL W/O P-5'-P-CCNC: 10 U/L (ref 10–44)
ANION GAP SERPL CALC-SCNC: 11 MMOL/L (ref 8–16)
APTT PPP: 37.4 SEC (ref 21–32)
AST SERPL-CCNC: 24 U/L (ref 10–40)
BASOPHILS # BLD AUTO: 0.04 K/UL (ref 0–0.2)
BASOPHILS NFR BLD: 0.6 % (ref 0–1.9)
BILIRUB SERPL-MCNC: 1.8 MG/DL (ref 0.1–1)
BUN SERPL-MCNC: 13 MG/DL (ref 6–20)
CALCIUM SERPL-MCNC: 8.6 MG/DL (ref 8.7–10.5)
CHLORIDE SERPL-SCNC: 101 MMOL/L (ref 95–110)
CO2 SERPL-SCNC: 23 MMOL/L (ref 23–29)
CREAT SERPL-MCNC: 1 MG/DL (ref 0.5–1.4)
DIFFERENTIAL METHOD BLD: ABNORMAL
EOSINOPHIL # BLD AUTO: 0.1 K/UL (ref 0–0.5)
EOSINOPHIL NFR BLD: 1.3 % (ref 0–8)
ERYTHROCYTE [DISTWIDTH] IN BLOOD BY AUTOMATED COUNT: 24.7 % (ref 11.5–14.5)
EST. GFR  (NO RACE VARIABLE): >60 ML/MIN/1.73 M^2
GLUCOSE SERPL-MCNC: 268 MG/DL (ref 70–110)
HCT VFR BLD AUTO: 25 % (ref 40–54)
HGB BLD-MCNC: 6.9 G/DL (ref 14–18)
IMM GRANULOCYTES # BLD AUTO: 0.02 K/UL (ref 0–0.04)
IMM GRANULOCYTES NFR BLD AUTO: 0.3 % (ref 0–0.5)
INR PPP: 4.1 (ref 0.8–1.2)
LDH SERPL L TO P-CCNC: 281 U/L (ref 110–260)
LYMPHOCYTES # BLD AUTO: 1.5 K/UL (ref 1–4.8)
LYMPHOCYTES NFR BLD: 23.6 % (ref 18–48)
MAGNESIUM SERPL-MCNC: 1.9 MG/DL (ref 1.6–2.6)
MAGNESIUM SERPL-MCNC: 1.9 MG/DL (ref 1.6–2.6)
MCH RBC QN AUTO: 17.3 PG (ref 27–31)
MCHC RBC AUTO-ENTMCNC: 27.6 G/DL (ref 32–36)
MCV RBC AUTO: 63 FL (ref 82–98)
MONOCYTES # BLD AUTO: 0.5 K/UL (ref 0.3–1)
MONOCYTES NFR BLD: 8.6 % (ref 4–15)
NEUTROPHILS # BLD AUTO: 4.1 K/UL (ref 1.8–7.7)
NEUTROPHILS NFR BLD: 65.6 % (ref 38–73)
NRBC BLD-RTO: 0 /100 WBC
PLATELET # BLD AUTO: 256 K/UL (ref 150–450)
PMV BLD AUTO: ABNORMAL FL (ref 9.2–12.9)
POCT GLUCOSE: 280 MG/DL (ref 70–110)
POCT GLUCOSE: 306 MG/DL (ref 70–110)
POCT GLUCOSE: 335 MG/DL (ref 70–110)
POCT GLUCOSE: 360 MG/DL (ref 70–110)
POCT GLUCOSE: 399 MG/DL (ref 70–110)
POTASSIUM SERPL-SCNC: 3.4 MMOL/L (ref 3.5–5.1)
POTASSIUM SERPL-SCNC: 4.1 MMOL/L (ref 3.5–5.1)
PROT SERPL-MCNC: 8.2 G/DL (ref 6–8.4)
PROTHROMBIN TIME: 41.5 SEC (ref 9–12.5)
RBC # BLD AUTO: 3.98 M/UL (ref 4.6–6.2)
SODIUM SERPL-SCNC: 135 MMOL/L (ref 136–145)
WBC # BLD AUTO: 6.27 K/UL (ref 3.9–12.7)

## 2024-05-21 PROCEDURE — 99233 SBSQ HOSP IP/OBS HIGH 50: CPT | Mod: ,,, | Performed by: INTERNAL MEDICINE

## 2024-05-21 PROCEDURE — 84132 ASSAY OF SERUM POTASSIUM: CPT | Performed by: STUDENT IN AN ORGANIZED HEALTH CARE EDUCATION/TRAINING PROGRAM

## 2024-05-21 PROCEDURE — 93750 INTERROGATION VAD IN PERSON: CPT | Mod: ,,, | Performed by: INTERNAL MEDICINE

## 2024-05-21 PROCEDURE — 80053 COMPREHEN METABOLIC PANEL: CPT | Performed by: STUDENT IN AN ORGANIZED HEALTH CARE EDUCATION/TRAINING PROGRAM

## 2024-05-21 PROCEDURE — 63600175 PHARM REV CODE 636 W HCPCS: Performed by: NURSE PRACTITIONER

## 2024-05-21 PROCEDURE — 83615 LACTATE (LD) (LDH) ENZYME: CPT | Performed by: STUDENT IN AN ORGANIZED HEALTH CARE EDUCATION/TRAINING PROGRAM

## 2024-05-21 PROCEDURE — 85610 PROTHROMBIN TIME: CPT | Performed by: STUDENT IN AN ORGANIZED HEALTH CARE EDUCATION/TRAINING PROGRAM

## 2024-05-21 PROCEDURE — 20600001 HC STEP DOWN PRIVATE ROOM

## 2024-05-21 PROCEDURE — 83735 ASSAY OF MAGNESIUM: CPT | Performed by: STUDENT IN AN ORGANIZED HEALTH CARE EDUCATION/TRAINING PROGRAM

## 2024-05-21 PROCEDURE — 63600175 PHARM REV CODE 636 W HCPCS: Performed by: STUDENT IN AN ORGANIZED HEALTH CARE EDUCATION/TRAINING PROGRAM

## 2024-05-21 PROCEDURE — 63600175 PHARM REV CODE 636 W HCPCS: Performed by: INTERNAL MEDICINE

## 2024-05-21 PROCEDURE — 25000003 PHARM REV CODE 250: Performed by: STUDENT IN AN ORGANIZED HEALTH CARE EDUCATION/TRAINING PROGRAM

## 2024-05-21 PROCEDURE — 99232 SBSQ HOSP IP/OBS MODERATE 35: CPT | Mod: ,,,

## 2024-05-21 PROCEDURE — 27000248 HC VAD-ADDITIONAL DAY

## 2024-05-21 PROCEDURE — 85025 COMPLETE CBC W/AUTO DIFF WBC: CPT | Performed by: STUDENT IN AN ORGANIZED HEALTH CARE EDUCATION/TRAINING PROGRAM

## 2024-05-21 PROCEDURE — A4216 STERILE WATER/SALINE, 10 ML: HCPCS | Performed by: INTERNAL MEDICINE

## 2024-05-21 PROCEDURE — 85730 THROMBOPLASTIN TIME PARTIAL: CPT | Performed by: STUDENT IN AN ORGANIZED HEALTH CARE EDUCATION/TRAINING PROGRAM

## 2024-05-21 PROCEDURE — 25000003 PHARM REV CODE 250: Performed by: INTERNAL MEDICINE

## 2024-05-21 RX ORDER — MAGNESIUM SULFATE HEPTAHYDRATE 40 MG/ML
2 INJECTION, SOLUTION INTRAVENOUS ONCE
Status: COMPLETED | OUTPATIENT
Start: 2024-05-21 | End: 2024-05-21

## 2024-05-21 RX ORDER — INSULIN GLARGINE 100 [IU]/ML
18 INJECTION, SOLUTION SUBCUTANEOUS 2 TIMES DAILY
Status: DISCONTINUED | OUTPATIENT
Start: 2024-05-21 | End: 2024-05-22

## 2024-05-21 RX ORDER — INSULIN ASPART 100 [IU]/ML
17 INJECTION, SOLUTION INTRAVENOUS; SUBCUTANEOUS
Status: DISCONTINUED | OUTPATIENT
Start: 2024-05-21 | End: 2024-05-22

## 2024-05-21 RX ORDER — POTASSIUM CHLORIDE 20 MEQ/1
40 TABLET, EXTENDED RELEASE ORAL
Status: COMPLETED | OUTPATIENT
Start: 2024-05-21 | End: 2024-05-21

## 2024-05-21 RX ADMIN — FUROSEMIDE 80 MG: 10 INJECTION, SOLUTION INTRAVENOUS at 05:05

## 2024-05-21 RX ADMIN — INSULIN ASPART 8 UNITS: 100 INJECTION, SOLUTION INTRAVENOUS; SUBCUTANEOUS at 11:05

## 2024-05-21 RX ADMIN — MILRINONE LACTATE IN DEXTROSE 0.25 MCG/KG/MIN: 200 INJECTION, SOLUTION INTRAVENOUS at 05:05

## 2024-05-21 RX ADMIN — MIRTAZAPINE 15 MG: 15 TABLET, FILM COATED ORAL at 09:05

## 2024-05-21 RX ADMIN — INSULIN GLARGINE 15 UNITS: 100 INJECTION, SOLUTION SUBCUTANEOUS at 09:05

## 2024-05-21 RX ADMIN — MILRINONE LACTATE IN DEXTROSE 0.25 MCG/KG/MIN: 200 INJECTION, SOLUTION INTRAVENOUS at 09:05

## 2024-05-21 RX ADMIN — CEFADROXIL 500 MG: 500 CAPSULE ORAL at 08:05

## 2024-05-21 RX ADMIN — INSULIN GLARGINE 18 UNITS: 100 INJECTION, SOLUTION SUBCUTANEOUS at 09:05

## 2024-05-21 RX ADMIN — Medication 10 ML: at 12:05

## 2024-05-21 RX ADMIN — POTASSIUM CHLORIDE 40 MEQ: 1500 TABLET, EXTENDED RELEASE ORAL at 08:05

## 2024-05-21 RX ADMIN — PHYTONADIONE 1 MG: 10 INJECTION, EMULSION INTRAMUSCULAR; INTRAVENOUS; SUBCUTANEOUS at 12:05

## 2024-05-21 RX ADMIN — ASPIRIN 81 MG: 81 TABLET, COATED ORAL at 08:05

## 2024-05-21 RX ADMIN — SPIRONOLACTONE 25 MG: 25 TABLET ORAL at 08:05

## 2024-05-21 RX ADMIN — PANTOPRAZOLE SODIUM 40 MG: 40 TABLET, DELAYED RELEASE ORAL at 08:05

## 2024-05-21 RX ADMIN — POTASSIUM CHLORIDE 40 MEQ: 1500 TABLET, EXTENDED RELEASE ORAL at 06:05

## 2024-05-21 RX ADMIN — Medication 400 MG: at 08:05

## 2024-05-21 RX ADMIN — INSULIN ASPART 6 UNITS: 100 INJECTION, SOLUTION INTRAVENOUS; SUBCUTANEOUS at 05:05

## 2024-05-21 RX ADMIN — INSULIN ASPART 5 UNITS: 100 INJECTION, SOLUTION INTRAVENOUS; SUBCUTANEOUS at 09:05

## 2024-05-21 RX ADMIN — LEVETIRACETAM 1500 MG: 500 TABLET, FILM COATED ORAL at 09:05

## 2024-05-21 RX ADMIN — INSULIN ASPART 8 UNITS: 100 INJECTION, SOLUTION INTRAVENOUS; SUBCUTANEOUS at 08:05

## 2024-05-21 RX ADMIN — INSULIN ASPART 17 UNITS: 100 INJECTION, SOLUTION INTRAVENOUS; SUBCUTANEOUS at 05:05

## 2024-05-21 RX ADMIN — AMLODIPINE BESYLATE 5 MG: 5 TABLET ORAL at 08:05

## 2024-05-21 RX ADMIN — FUROSEMIDE 80 MG: 10 INJECTION, SOLUTION INTRAVENOUS at 02:05

## 2024-05-21 RX ADMIN — ATORVASTATIN CALCIUM 40 MG: 20 TABLET, FILM COATED ORAL at 08:05

## 2024-05-21 RX ADMIN — MAGNESIUM SULFATE HEPTAHYDRATE 2 G: 40 INJECTION, SOLUTION INTRAVENOUS at 05:05

## 2024-05-21 RX ADMIN — INSULIN ASPART 14 UNITS: 100 INJECTION, SOLUTION INTRAVENOUS; SUBCUTANEOUS at 08:05

## 2024-05-21 RX ADMIN — FUROSEMIDE 80 MG: 10 INJECTION, SOLUTION INTRAVENOUS at 09:05

## 2024-05-21 RX ADMIN — CEFADROXIL 500 MG: 500 CAPSULE ORAL at 09:05

## 2024-05-21 RX ADMIN — MAGNESIUM SULFATE HEPTAHYDRATE 2 G: 40 INJECTION, SOLUTION INTRAVENOUS at 08:05

## 2024-05-21 RX ADMIN — INSULIN ASPART 17 UNITS: 100 INJECTION, SOLUTION INTRAVENOUS; SUBCUTANEOUS at 01:05

## 2024-05-21 RX ADMIN — CYCLOBENZAPRINE HYDROCHLORIDE 5 MG: 5 TABLET, FILM COATED ORAL at 12:05

## 2024-05-21 RX ADMIN — Medication 10 ML: at 05:05

## 2024-05-21 RX ADMIN — LEVETIRACETAM 1500 MG: 500 TABLET, FILM COATED ORAL at 08:05

## 2024-05-21 NOTE — ASSESSMENT & PLAN NOTE
- Interval History was obtained from team member  during rounding today.  - VAD/ANABELLE teaching was performed with patient.  - Mobilization/Physical Therapy is ongoing.  - Anticoagulation held INR 4.1  - Admitted with volume overload and malfunctioning PICC   - PICC replaced with some bleeding from site   - WBC 6.27  - H/H 6.9/25  - BUN/Creatinine 13/1  -   - Net negative 2.7L / made 3.2L     More than 50 percent of the care dominated counseling and coordinating care with different team members. The VAD was interrogated and any significant findings noted above.

## 2024-05-21 NOTE — PT/OT/SLP PROGRESS
Physical Therapy     PT orders received and acknowledged. Pt with LVAD implantation on 6/2022. Pt currently admitted for heart failure. Pt reports he is at baseline mobility, independent with all mobility and ADLs. Pt reports he has been ambulating independently, denies any dizziness, or recent falls. Pt reports he has SOB but that is his baseline- current SOB is not worse than his normal. Pt reports independence with VAD management with no concerns.  Pt with good understanding of PT role in acute care setting and denies need for acute skilled therapy intervention. PT provided education to pt regarding importance of maintaining frequent activity and ambulating while admitted to maintain independent level of mobility. Pt verbalized understanding. At this time, pt does not require further acute skilled therapy intervention. Discharge from PT services and re-consult if pt experiences a change in status. No billable units this date.

## 2024-05-21 NOTE — PLAN OF CARE
AAOX4,VSS,O2 sats>99% on RA.Plan of care discussed with patient.Patient has no complaints of pain/SOB. Discussed medications and care. Patient has no questions at this time.Pt visualised and stable.Call light within reach.Pt resting,bed at lowest position.

## 2024-05-21 NOTE — PROGRESS NOTES
[facility]  Heart Transplant  Progress Note    Patient Name: Kevan Queen  MRN: [id number]  Admission Date: 5/20/2024  Hospital Length of Stay: 1 days  Attending Physician: Marlo Marques MD  Primary Care Provider: Rosio Armendariz FNP  Principal Problem:Acute on chronic combined systolic and diastolic heart failure    Subjective:   Interval History: Overnight patient had a small bloody oozing from picc line insertion site that did not resolve with pressure. This morning dressing was changed and surgicel was placed.     Continuous Infusions:   milrinone 20mg/100ml D5W (200mcg/ml)  0.25 mcg/kg/min Intravenous Continuous 7.1 mL/hr at 05/21/24 0905 0.25 mcg/kg/min at 05/21/24 0905     Scheduled Meds:   amLODIPine  5 mg Oral Daily    aspirin  81 mg Oral Daily    atorvastatin  40 mg Oral Daily    cefadroxil  500 mg Oral Q12H    furosemide (LASIX) injection  80 mg Intravenous Q8H    insulin aspart U-100  17 Units Subcutaneous TIDWM    insulin glargine U-100  18 Units Subcutaneous BID    levETIRAcetam  1,500 mg Oral BID    magnesium oxide  400 mg Oral Daily    mirtazapine  15 mg Oral Nightly    pantoprazole  40 mg Oral Daily    sodium chloride 0.9%  10 mL Intravenous Q6H    spironolactone  25 mg Oral Daily     PRN Meds:  Current Facility-Administered Medications:     acetaminophen, 650 mg, Oral, Q6H PRN    cyclobenzaprine, 5 mg, Oral, TID PRN    dextrose 10%, 12.5 g, Intravenous, PRN    dextrose 10%, 25 g, Intravenous, PRN    glucagon (human recombinant), 1 mg, Intramuscular, PRN    glucose, 16 g, Oral, PRN    glucose, 24 g, Oral, PRN    insulin aspart U-100, 0-10 Units, Subcutaneous, QID (AC + HS) PRN    Flushing PICC/Midline Protocol, , , Until Discontinued **AND** sodium chloride 0.9%, 10 mL, Intravenous, Q6H **AND** sodium chloride 0.9%, 10 mL, Intravenous, PRN    Review of patient's allergies indicates:   Allergen Reactions    Bumex [bumetanide] Hives    Torsemide Hives     Objective:      Vital Signs (Most Recent):  Temp: 97.8 °F (36.6 °C) (05/21/24 1106)  Pulse: (!) 111 (05/21/24 1227)  Resp: 18 (05/21/24 1106)  BP: (!) 82/0 (05/21/24 0845)  SpO2: 99 % (05/21/24 1106) Vital Signs (24h Range):  Temp:  [97.5 °F (36.4 °C)-98.6 °F (37 °C)] 97.8 °F (36.6 °C)  Pulse:  [] 111  Resp:  [17-24] 18  SpO2:  [95 %-100 %] 99 %  BP: (78-86)/(0) 82/0     Patient Vitals for the past 72 hrs (Last 3 readings):   Weight   05/21/24 0748 84.5 kg (186 lb 4.6 oz)   05/21/24 0437 84.6 kg (186 lb 8.2 oz)   05/20/24 1513 81.6 kg (180 lb)     Body mass index is 23.28 kg/m².      Intake/Output Summary (Last 24 hours) at 5/21/2024 1231  Last data filed at 5/21/2024 0930  Gross per 24 hour   Intake 720 ml   Output 3700 ml   Net -2980 ml       Hemodynamic Parameters:            Physical Exam  Constitutional:       General: He is not in acute distress.     Appearance: Normal appearance.   HENT:      Head: Normocephalic and atraumatic.   Eyes:      Conjunctiva/sclera: Conjunctivae normal.   Neck:      Vascular: JVD present.      Comments: JVP to jaw at 45 degrees  Cardiovascular:      Comments: VAD hum appreciated  Pulmonary:      Breath sounds: Normal breath sounds.      Comments: Clear to auscultation  Abdominal:      Comments: Soft, non-tender, non-distended   Musculoskeletal:      Cervical back: Normal range of motion.      Comments: Bilateral LE pitting edema.    Skin:     General: Skin is warm and dry.      Capillary Refill: Capillary refill takes less than 2 seconds.   Neurological:      Mental Status: He is alert and oriented to person, place, and time.   Psychiatric:         Mood and Affect: Mood and affect normal.            Significant Labs:  CBC:  Recent Labs   Lab 05/20/24  1422 05/20/24  1550 05/20/24  1555 05/21/24  0422   WBC 6.11 6.16  --  6.27   RBC 4.34* 4.40*  --  3.98*   HGB 7.6* 7.5*  --  6.9*   HCT 27.1* 28.2* 30* 25.0*    265  --  256   MCV 62* 64*  --  63*   MCH 17.5* 17.0*  --  17.3*   MCHC  28.0* 26.6*  --  27.6*     BNP:  Recent Labs   Lab 05/20/24  1422 05/20/24  1550   BNP 1,148* 1,168*     CMP:  Recent Labs   Lab 05/20/24  1422 05/20/24  1550 05/21/24  0422   * 63* 268*   CALCIUM 8.9 8.8 8.6*   ALBUMIN 2.9* 3.0* 2.8*   PROT 8.5* 8.5* 8.2   * 138 135*   K 3.0* 3.3* 3.4*   CO2 23 25 23    107 101   BUN 12 12 13   CREATININE 1.0 1.0 1.0   ALKPHOS 167* 170* 163*   ALT 10 11 10   AST 23 23 24   BILITOT 1.9* 1.9* 1.8*      Coagulation:   Recent Labs   Lab 05/20/24  1422 05/20/24  1550 05/21/24  0422   INR 5.2* 5.4* 4.1*   APTT  --   --  37.4*     LDH:  Recent Labs   Lab 05/20/24  1422 05/20/24  1550 05/21/24  0422   * 295* 281*     Microbiology:  Microbiology Results (last 7 days)       Procedure Component Value Units Date/Time    Aerobic culture [[id number]] Collected: 05/20/24 1653    Order Status: Completed Specimen: Wound from Abdomen Updated: 05/21/24 0802     Aerobic Bacterial Culture No growth    Narrative:      LEXX ALVAREZ    Culture, Anaerobe [[id number]] Collected: 05/20/24 1653    Order Status: Completed Specimen: Wound from Abdomen Updated: 05/21/24 0727     Anaerobic Culture Culture in progress    Narrative:      LEXX ALVAREZ    Gram stain [[id number]] Collected: 05/20/24 1653    Order Status: Completed Specimen: Wound from Abdomen Updated: 05/20/24 1957     Gram Stain Result No WBC's      No organisms seen    Narrative:      LEXX ALVAREZ            I have reviewed all pertinent labs within the past 24 hours.    Estimated Creatinine Clearance: 118.5 mL/min (based on SCr of 1 mg/dL).    Diagnostic Results:  I have reviewed all pertinent imaging results/findings within the past 24 hours.  Assessment and Plan:     Mr. Kevan Thacker is a 39 year old male with stage D 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 Familial CM-Father had LVAD and subsequent heart transplant), polysubstance abuse, DM underwent DT-HM3 implantation 6/23/2022 with early RV failure requiring RVAD with ProTek Duo after admitted  with ADHF/cardiogenic shock on home milrinone requiring IABP. He underwent RVAD removal and chest closure 6/30/2022.  He also completed a course of IV Abx for staph epi. He was weaned off  but he had to restarted due to RVF. He was eventually transitioned to milrinone (secondary to  shortage) now on 0.25 mcg/kg/min. He has a history of unclear syncope vs seizures and is followed by neuro for this and is on Keppra.       On 11/15/23 underwent removal of eroded [employer] scICD--no Lifevest (due to LVAD) and no plan to replace ICD as not requiring therapy post LVAD with concern for reinfection.  He has not had neurology f/u with seizure hx on keppra so coordinator to assist him with obtaining appt.    Patient presented today for routine HTS clinic appointment but was found to hunched over in a chair w/ marked dyspnea. Patient sent directly to the ED for further evaluation. States Picc line stopped working last night. Patient currently endorses chronic SOB w.exertion, but denies any SOB at rest, orthopnea, chest pain, fever, chills, nausea, vomiting, abdominal pain, abd distention, PND. States instead of take Lasix 80mg daily, he was taking Lasix 40mg bid.     Of note, patient was most recently hospitalized from 4/20-4/26 for ADHF and hyperglycemia. patient noted to be hyperglycemia w/ glucose>500 and was started on insulin drip and then switched to basal bolus dosing as per Endocrinology once hyperglycemia resolved. Patient was also started on IV lasix for ADHF. Patient throughout the hospital course had difficulty taking lasix due to extreme cramping not related to electrolyte de-arrangement. Discussion was had with patient on whether he would rather switch to hospice care patient stated he would rather live with the discomfort associated w/ lasix. At the time of discharge patient was maintaining euvolemia w/ lasix PO 80mg daily.    Home Medications:   amlodipine besylate 5 mg Oral Daily  aspirin 81 mg  Oral Daily  atorvastatin calcium 40 mg Oral Daily  cefadroxil 500 mg Oral Every 12 hours  cyclobenzaprine HCl 5 mg Oral 3 times daily PRN  furosemide 80 mg Oral Daily  Insulin detemir 20 units bid  Insulin aspart 18units tid  Keppra 1500mg bid  magnesium oxide 400 mg Oral Daily  milrinone lactate,milrinone lactate/D5W 0.25 mcg/kg/min (23.6 mcg/min) Intravenous Continuous  mirtazapine 15 mg Oral Nightly  pantoprazole sodium 40 mg Oral Daily  potassium chloride 10 MEQ 30 mEq Oral Daily  spironolactone 25 mg Oral Daily  warfarin sodium 3 MG Take 1.5 tablets (4.5mg) orally daily, except 1 tablet (3mg) on Sundays and Fridays        Cardiac Imaging:   Echo (9/2/2023): HM3 5100. AV does not open. IV septum midline. LV sevewrely dilated w/ normal ventricular mass, normal wall thickness, normal wall motion, severely reduced systolic function w/ EF 10-15%, and normal diastolic function. RV normal cavity size w/ normal wall thickness, normal wall motion, and normal systolic function. Mild MR. Mod to severe TR. PA sys pressure 38mmHg. LVIDd 7.11cm.     RHC (10/31/2022): RA 18, PWP 21, CO 3.55, CI 1.6. On mil 0.125 and HM3 5100.     * Acute on chronic combined systolic and diastolic heart failure  -NICM s/p HM 3 on home Milrinone   -Last 2D Echo  9/5/23 : LVEF 10-15%, LVEDD  7.11 cm with speed at 5100  -Last RHC: 10/31/22 with speed at 5100 on Milrinone 0.125 RA: 18, RV: 35/7 (22), PAP: 35/19 (24), PWP: 21, CO: 3.55, CI: 1.6  -Hypervolemic on examination today. Continue IV lasix 80mg tid. Cyclobenzaprine to be given w/ lasix to prevent cramping (historicaly not related to electrolytes)  -Continue home milrinone 0.25  -GDMT with N/A  -2g Na dietary restriction, 1500 mL fluid restriction, strict I/Os    LVAD (left ventricular assist device) present  Procedure: Device Interrogation Including analysis of device parameters  Current Settings: Ventricular Assist Device  Review of device function is stable/unstable  stable  Anticoagulation w/ coumadin. Goal INR 2-3. Supratherapeutic. Hold today. Will give 1mg PO Vit K.         5/21/2024     8:34 AM 5/21/2024     4:14 AM 5/20/2024    11:51 PM 5/20/2024     8:42 PM 5/20/2024     3:52 PM 4/27/2024    12:08 AM 4/26/2024     8:49 PM   TXP LVAD INTERROGATIONS   Type HeartMate3 HeartMate3 HeartMate3 HeartMate3 HeartMate3 HeartMate3 HeartMate3   Flow 4.4 4.3 4.1 4.2 4.2 4.3 4.4   Speed 5100 5150 5150 5400 5100 5100 5100   PI 4.5 4.4 4.7 4.2 4.5 4.8 4.3   Power (Serna) 3.6 3.6 3.6 3.6 3.6 3.6 3.6   LSL 4700  4700       Pulsatility No Pulse  No Pulse No Pulse No Pulse Intermittent pulse Intermittent pulse         Seizure-like activity  Continue home keppra.     Type 2 diabetes mellitus with hyperglycemia, with long-term current use of insulin  Management as per Endocrinology.     HTN (hypertension)  - continue amlodopine.         Chantal Herndon MD  Heart Transplant  [facility] - [facility]

## 2024-05-21 NOTE — CARE UPDATE
Contacted by nursing regarding patient's oozing PICC line in [place] placed yesterday. Overnight, they have had to change his dressing 2-3 times due to continued bleeding. Manual pressure, gauze packing, and external compression with Coban have all been tried. Patietn arm isn't bothering him too much on assessment, but he isn't interested in us trying to suture around the line tonight to assist with hemostasis. Relayed this to nursing as well. Do not feel as though we need to reverse his Coumadin for a slow, venous bleed (INR 5.4 yesterday). Will notify morning team and reassess need (or replacement of PICC).     Favian Hart MD PGY6  Cardiovascular Medicine Fellow  [facility]  Pager: [phone]

## 2024-05-21 NOTE — PROGRESS NOTES
"[facility] - [facility] Step[facility]  Endocrinology  Progress Note    Admit Date: 2024     Reason for Consult: Management of T2DM, Hyperglycemia      Surgical Procedure and Date: LVAD 2022     Diabetes diagnosis year:      Home Diabetes Medications:   -Levemir 20 units BID   -Novolog 18 units TIDWM in addition to the following correction scale:    150 - 200 + 2 unit    201 - 250 + 4 units    251 - 300 + 6 units    301 - 350 + 8 units       > 350   + 10 units     How often checking glucose at home?  > 4 times per day  BG readings on regimen: 180's-200's  Hypoglycemia on the regimen?  No  Missed doses on regimen?  occasionally skips mealsn and will skip Novolog with breakfast      Diabetes Complications include:     Hyperglycemia     Complicating diabetes co morbidities:   History of MI, CHF, and CKD       Interval HPI:   No acute events overnight. Patient in room 304/304 A. Blood glucose worsening. BG above goal on current insulin regimen (SSI, prandial, and basal insulin ). Steroid use- None.      Renal function- Normal   Vasopressors-  None     Diet diabetic 2000 Calorie; Fluid - 1500mL     Eatin%  Nausea: No  Hypoglycemia and intervention: No  Fever: No  TPN and/or TF: No    BP (!) 82/0   Pulse (!) 117   Temp 98.1 °F (36.7 °C) (Oral)   Resp 18   Ht 6' 3" (1.905 m)   Wt 84.5 kg (186 lb 4.6 oz)   SpO2 99%   BMI 23.28 kg/m²     Labs Reviewed and Include    Recent Labs   Lab 24  0422   *   CALCIUM 8.6*   ALBUMIN 2.8*   PROT 8.2   *   K 3.4*   CO2 23      BUN 13   CREATININE 1.0   ALKPHOS 163*   ALT 10   AST 24   BILITOT 1.8*     Lab Results   Component Value Date    WBC 6.27 2024    HGB 6.9 (L) 2024    HCT 25.0 (L) 2024    MCV 63 (L) 2024     2024     No results for input(s): "TSH", "FREET4" in the last 168 hours.  Lab Results   Component Value Date    HGBA1C >14.0 (H) 2024       Nutritional status:   Body mass index is 23.28 " kg/m².  Lab Results   Component Value Date    ALBUMIN 2.8 (L) 05/21/2024    ALBUMIN 3.0 (L) 05/20/2024    ALBUMIN 2.9 (L) 05/20/2024     Lab Results   Component Value Date    PREALBUMIN 8 (L) 05/20/2024    PREALBUMIN 12 (L) 04/26/2024    PREALBUMIN 13 (L) 04/24/2024       Estimated Creatinine Clearance: 118.5 mL/min (based on SCr of 1 mg/dL).    Accu-Checks  Recent Labs     05/20/24  2131 05/21/24  0754   POCTGLUCOSE 182* 335*       Current Medications and/or Treatments Impacting Glycemic Control  Immunotherapy:    Immunosuppressants       None          Steroids:   Hormones (From admission, onward)      None          Pressors:    Autonomic Drugs (From admission, onward)      None          Hyperglycemia/Diabetes Medications:   Antihyperglycemics (From admission, onward)      Start     Stop Route Frequency Ordered    05/21/24 0900  insulin glargine U-100 (Lantus) pen 15 Units         -- SubQ Daily 05/20/24 1851 05/21/24 0715  insulin aspart U-100 pen 14 Units         -- SubQ 3 times daily with meals 05/20/24 1851 05/20/24 2100  insulin glargine U-100 (Lantus) pen 15 Units         -- SubQ Nightly 05/20/24 1851 05/20/24 1822  insulin aspart U-100 pen 0-10 Units         -- SubQ Before meals & nightly PRN 05/20/24 1722            ASSESSMENT and PLAN    Cardiac/Vascular  * Acute on chronic combined systolic and diastolic heart failure  Managed per primary team  Avoid hypoglycemia        LVAD (left ventricular assist device) present  Managed per primary team  Avoid hypoglycemia        Endocrine  Type 2 diabetes mellitus with hyperglycemia, with long-term current use of insulin  Endocrinology consulted for BG management.   BG goal 140-180    20% dose increase given excursions   - Lantus (Insulin Glargine) 18 units BID  - Novolog (Insulin Aspart) 17 units TIDWM and prn for BG excursions MDC SSI (150/25)  - BG checks AC/HS  - Hypoglycemia protocol in place      ** Please notify Endocrine for any change and/or advance  in diet**  ** Please call Endocrine for any BG related issues **    Discharge Planning:   TBD. Please notify endocrinology prior to discharge.            Susanna Doty PA-C  Endocrinology  [facility]

## 2024-05-21 NOTE — PROGRESS NOTES
05/21/2024  Michael Montes    Current provider:  Marlo Marques MD    Device interrogation:      5/21/2024     4:14 AM 5/20/2024    11:51 PM 5/20/2024     8:42 PM 5/20/2024     3:52 PM 4/27/2024    12:08 AM 4/26/2024     8:49 PM 4/26/2024     4:00 PM   TXP LVAD INTERROGATIONS   Type HeartMate3 HeartMate3 HeartMate3 HeartMate3 HeartMate3 HeartMate3 HeartMate3   Flow 4.3 4.1 4.2 4.2 4.3 4.4 4.2   Speed 5150 5150 5400 5100 5100 5100 5100   PI 4.4 4.7 4.2 4.5 4.8 4.3 4.8   Power (Serna) 3.6 3.6 3.6 3.6 3.6 3.6 3.7   LSL  4700     4700   Pulsatility  No Pulse No Pulse No Pulse Intermittent pulse Intermittent pulse Pulse          Rounded on Kevan Queen to ensure all mechanical assist device settings (IABP or VAD) were appropriate and all parameters were within limits.  I was able to ensure all back up equipment was present, the staff had no issues, and the Perfusion Department daily rounding was complete.      For implantable VADs: Interrogation of Ventricular assist device was performed with analysis of device parameters and review of device function. I have personally reviewed the interrogation findings and agree with findings as stated.     In emergency, the nursing units have been notified to contact the perfusion department either by:  Calling [phone] from 630am to 4pm Mon thru Fri, utilizing the On-Call Finder functionality of Epic and searching for Perfusion, or by contacting the hospital  from 4pm to 630am and on weekends and asking to speak with the perfusionist on call.    7:03 AM

## 2024-05-21 NOTE — ASSESSMENT & PLAN NOTE
-NICM s/p HM 3 on home Milrinone   -Last 2D Echo  9/5/23 : LVEF 10-15%, LVEDD  7.11 cm with speed at 5100  -Last RHC: 10/31/22 with speed at 5100 on Milrinone 0.125 RA: 18, RV: 35/7 (22), PAP: 35/19 (24), PWP: 21, CO: 3.55, CI: 1.6  -Hypervolemic on examination today. Continue IV lasix 80mg tid. Cyclobenzaprine to be given w/ lasix to prevent cramping (historicaly not related to electrolytes)  -Continue home milrinone 0.25  -GDMT with N/A  -2g Na dietary restriction, 1500 mL fluid restriction, strict I/Os

## 2024-05-21 NOTE — SUBJECTIVE & OBJECTIVE
"Interval HPI:   No acute events overnight. Patient in room 304/304 A. Blood glucose worsening. BG above goal on current insulin regimen (SSI, prandial, and basal insulin ). Steroid use- None.      Renal function- Normal   Vasopressors-  None     Diet diabetic 2000 Calorie; Fluid - 1500mL     Eatin%  Nausea: No  Hypoglycemia and intervention: No  Fever: No  TPN and/or TF: No    BP (!) 82/0   Pulse (!) 117   Temp 98.1 °F (36.7 °C) (Oral)   Resp 18   Ht 6' 3" (1.905 m)   Wt 84.5 kg (186 lb 4.6 oz)   SpO2 99%   BMI 23.28 kg/m²     Labs Reviewed and Include    Recent Labs   Lab 24  0422   *   CALCIUM 8.6*   ALBUMIN 2.8*   PROT 8.2   *   K 3.4*   CO2 23      BUN 13   CREATININE 1.0   ALKPHOS 163*   ALT 10   AST 24   BILITOT 1.8*     Lab Results   Component Value Date    WBC 6.27 2024    HGB 6.9 (L) 2024    HCT 25.0 (L) 2024    MCV 63 (L) 2024     2024     No results for input(s): "TSH", "FREET4" in the last 168 hours.  Lab Results   Component Value Date    HGBA1C >14.0 (H) 2024       Nutritional status:   Body mass index is 23.28 kg/m².  Lab Results   Component Value Date    ALBUMIN 2.8 (L) 2024    ALBUMIN 3.0 (L) 2024    ALBUMIN 2.9 (L) 2024     Lab Results   Component Value Date    PREALBUMIN 8 (L) 2024    PREALBUMIN 12 (L) 2024    PREALBUMIN 13 (L) 2024       Estimated Creatinine Clearance: 118.5 mL/min (based on SCr of 1 mg/dL).    Accu-Checks  Recent Labs     24  2131 24  0754   POCTGLUCOSE 182* 335*       Current Medications and/or Treatments Impacting Glycemic Control  Immunotherapy:    Immunosuppressants       None          Steroids:   Hormones (From admission, onward)      None          Pressors:    Autonomic Drugs (From admission, onward)      None          Hyperglycemia/Diabetes Medications:   Antihyperglycemics (From admission, onward)      Start     Stop Route Frequency Ordered    " 05/21/24 0900  insulin glargine U-100 (Lantus) pen 15 Units         -- SubQ Daily 05/20/24 1851 05/21/24 0715  insulin aspart U-100 pen 14 Units         -- SubQ 3 times daily with meals 05/20/24 1851 05/20/24 2100  insulin glargine U-100 (Lantus) pen 15 Units         -- SubQ Nightly 05/20/24 1851 05/20/24 1822  insulin aspart U-100 pen 0-10 Units         -- SubQ Before meals & nightly PRN 05/20/24 1722

## 2024-05-21 NOTE — CONSULTS
TRUPTI consulted for picc bleeding at site.    Currently is not bleeding.  Please change dressing this shift.

## 2024-05-21 NOTE — SUBJECTIVE & OBJECTIVE
Interval History: NAEON. Admitted from clinic with increased volume and malfunctioning PICC line.     Implant 6/29/22    Medications:  Continuous Infusions:   milrinone 20mg/100ml D5W (200mcg/ml)  0.25 mcg/kg/min Intravenous Continuous 7.1 mL/hr at 05/21/24 0905 0.25 mcg/kg/min at 05/21/24 0905     Scheduled Meds:   amLODIPine  5 mg Oral Daily    aspirin  81 mg Oral Daily    atorvastatin  40 mg Oral Daily    cefadroxil  500 mg Oral Q12H    furosemide (LASIX) injection  80 mg Intravenous Q8H    insulin aspart U-100  17 Units Subcutaneous TIDWM    insulin glargine U-100  15 Units Subcutaneous Daily    insulin glargine U-100  15 Units Subcutaneous QHS    levETIRAcetam  1,500 mg Oral BID    magnesium oxide  400 mg Oral Daily    mirtazapine  15 mg Oral Nightly    pantoprazole  40 mg Oral Daily    phytonadione  1 mg Oral Once    sodium chloride 0.9%  10 mL Intravenous Q6H    spironolactone  25 mg Oral Daily     PRN Meds:  Current Facility-Administered Medications:     acetaminophen, 650 mg, Oral, Q6H PRN    cyclobenzaprine, 5 mg, Oral, TID PRN    dextrose 10%, 12.5 g, Intravenous, PRN    dextrose 10%, 25 g, Intravenous, PRN    glucagon (human recombinant), 1 mg, Intramuscular, PRN    glucose, 16 g, Oral, PRN    glucose, 24 g, Oral, PRN    insulin aspart U-100, 0-10 Units, Subcutaneous, QID (AC + HS) PRN    Flushing PICC/Midline Protocol, , , Until Discontinued **AND** sodium chloride 0.9%, 10 mL, Intravenous, Q6H **AND** sodium chloride 0.9%, 10 mL, Intravenous, PRN     Objective:     Vital Signs (Most Recent):  Temp: 97.8 °F (36.6 °C) (05/21/24 1106)  Pulse: 70 (05/21/24 1106)  Resp: 18 (05/21/24 1106)  BP: (!) 82/0 (05/21/24 0845)  SpO2: 99 % (05/21/24 1106) Vital Signs (24h Range):  Temp:  [97.5 °F (36.4 °C)-98.6 °F (37 °C)] 97.8 °F (36.6 °C)  Pulse:  [] 70  Resp:  [17-24] 18  SpO2:  [95 %-100 %] 99 %  BP: (78-86)/(0) 82/0       Intake/Output Summary (Last 24 hours) at 5/21/2024  "1152  Last data filed at 5/21/2024 0930  Gross per 24 hour   Intake 720 ml   Output 3700 ml   Net -2980 ml       Physical Exam  Vitals reviewed.   Constitutional:       Appearance: He is well-developed.   HENT:      Head: Normocephalic and atraumatic.   Cardiovascular:      Comments: VAD  Pulmonary:      Effort: Pulmonary effort is normal. No respiratory distress.   Skin:     Coloration: Skin is not pale.   Neurological:      General: No focal deficit present.      Mental Status: He is alert.   Psychiatric:         Mood and Affect: Mood normal.     Significant Labs:  ABGs: No results for input(s): "PH", "PCO2", "PO2", "HCO3", "POCSATURATED", "BE" in the last 48 hours.  Amylase: No results for input(s): "AMYLASE" in the last 48 hours.  BMP:   Recent Labs   Lab 05/21/24 0422   *   *   K 3.4*      CO2 23   BUN 13   CREATININE 1.0   CALCIUM 8.6*   MG 1.9     Cardiac markers:   Recent Labs   Lab 05/20/24  1550   TROPONINI 0.018     CBC:   Recent Labs   Lab 05/21/24 0422   WBC 6.27   RBC 3.98*   HGB 6.9*   HCT 25.0*      MCV 63*   MCH 17.3*   MCHC 27.6*     CMP:   Recent Labs   Lab 05/21/24 0422   *   CALCIUM 8.6*   ALBUMIN 2.8*   PROT 8.2   *   K 3.4*   CO2 23      BUN 13   CREATININE 1.0   ALKPHOS 163*   ALT 10   AST 24   BILITOT 1.8*     Coagulation:   Recent Labs   Lab 05/21/24 0422   INR 4.1*   APTT 37.4*     Lactic Acid: No results for input(s): "LACTATE" in the last 48 hours.  LFTs:   Recent Labs   Lab 05/21/24 0422   ALT 10   AST 24   ALKPHOS 163*   BILITOT 1.8*   PROT 8.2   ALBUMIN 2.8*     Lipase: No results for input(s): "LIPASE" in the last 48 hours.    Significant Diagnostics:  I have reviewed all pertinent imaging results/findings within the past 24 hours.    Procedure: Device Interrogation Including analysis of device parameters  Current Settings: Ventricular Assist Device  Review of device function is stable      5/21/2024     8:34 AM 5/21/2024     4:14 AM " 5/20/2024    11:51 PM 5/20/2024     8:42 PM 5/20/2024     3:52 PM 4/27/2024    12:08 AM 4/26/2024     8:49 PM   TXP LVAD INTERROGATIONS   Type HeartMate3 HeartMate3 HeartMate3 HeartMate3 HeartMate3 HeartMate3 HeartMate3   Flow 4.4 4.3 4.1 4.2 4.2 4.3 4.4   Speed 5100 5150 5150 5400 5100 5100 5100   PI 4.5 4.4 4.7 4.2 4.5 4.8 4.3   Power (Serna) 3.6 3.6 3.6 3.6 3.6 3.6 3.6   LSL 4700  4700       Pulsatility No Pulse  No Pulse No Pulse No Pulse Intermittent pulse Intermittent pulse

## 2024-05-21 NOTE — NURSING
Notified on call patients PICC line profusely bleeding have done three dressing changes and held pressure. On call Dr. Favian Hart states he will come and assess the patient.

## 2024-05-21 NOTE — PLAN OF CARE
Recommendations  1. Continue Diabetic diet (2000 calorie) -Fluid per MD   2. RD following     Goals: Meet % of EEN/EPN by RD f/u date  Nutrition Goal Status: progressing   Communication of RD Recs: POC

## 2024-05-21 NOTE — PROGRESS NOTES
Ordered 2 boxes of daily kits for pt to use at home and dropped charge.  Pt had appt yesterday, but was sent to ER prior to seeing MD so dressings were not able to be ordered.    0900: Pt ZULEMA, in good spirits.  No family at bedside. No questions or needs from me at this time.

## 2024-05-21 NOTE — CARE UPDATE
I have reviewed the chart of Kevan Queen who is hospitalized for the following:    Active Hospital Problems    Diagnosis    *Acute on chronic combined systolic and diastolic heart failure    Hypokalemia     POA  Daily labs      Type 2 diabetes mellitus with hyperglycemia, with long-term current use of insulin    HTN (hypertension)    Receiving inotropic medication    Seizure-like activity    LVAD (left ventricular assist device) present    Anemia of chronic disease        Angela Mota NP  [facility]

## 2024-05-21 NOTE — ASSESSMENT & PLAN NOTE
Procedure: Device Interrogation Including analysis of device parameters  Current Settings: Ventricular Assist Device  Review of device function is stable/unstable stable  Anticoagulation w/ coumadin. Goal INR 2-3. Supratherapeutic. Hold today. Will give 1mg PO Vit K.         5/21/2024     8:34 AM 5/21/2024     4:14 AM 5/20/2024    11:51 PM 5/20/2024     8:42 PM 5/20/2024     3:52 PM 4/27/2024    12:08 AM 4/26/2024     8:49 PM   TXP LVAD INTERROGATIONS   Type HeartMate3 HeartMate3 HeartMate3 HeartMate3 HeartMate3 HeartMate3 HeartMate3   Flow 4.4 4.3 4.1 4.2 4.2 4.3 4.4   Speed 5100 5150 5150 5400 5100 5100 5100   PI 4.5 4.4 4.7 4.2 4.5 4.8 4.3   Power (Serna) 3.6 3.6 3.6 3.6 3.6 3.6 3.6   LSL 4700  4700       Pulsatility No Pulse  No Pulse No Pulse No Pulse Intermittent pulse Intermittent pulse

## 2024-05-21 NOTE — ASSESSMENT & PLAN NOTE
Endocrinology consulted for BG management.   BG goal 140-180    - Lantus (Insulin Glargine) 15 units BID  - Novolog (Insulin Aspart) 14 units TIDWM and prn for BG excursions [facility] SSI (150/25)  - BG checks AC/HS  - Hypoglycemia protocol in place      ** Please notify Endocrine for any change and/or advance in diet**  ** Please call Endocrine for any BG related issues **    Discharge Planning:   TBD. Please notify endocrinology prior to discharge.

## 2024-05-21 NOTE — SUBJECTIVE & OBJECTIVE
Interval History: Overnight patient had a small bloody oozing from picc line insertion site that did not resolve with pressure. This morning dressing was changed and surgicel was placed.     Continuous Infusions:   milrinone 20mg/100ml D5W (200mcg/ml)  0.25 mcg/kg/min Intravenous Continuous 7.1 mL/hr at 05/21/24 0905 0.25 mcg/kg/min at 05/21/24 0905     Scheduled Meds:   amLODIPine  5 mg Oral Daily    aspirin  81 mg Oral Daily    atorvastatin  40 mg Oral Daily    cefadroxil  500 mg Oral Q12H    furosemide (LASIX) injection  80 mg Intravenous Q8H    insulin aspart U-100  17 Units Subcutaneous TIDWM    insulin glargine U-100  18 Units Subcutaneous BID    levETIRAcetam  1,500 mg Oral BID    magnesium oxide  400 mg Oral Daily    mirtazapine  15 mg Oral Nightly    pantoprazole  40 mg Oral Daily    sodium chloride 0.9%  10 mL Intravenous Q6H    spironolactone  25 mg Oral Daily     PRN Meds:  Current Facility-Administered Medications:     acetaminophen, 650 mg, Oral, Q6H PRN    cyclobenzaprine, 5 mg, Oral, TID PRN    dextrose 10%, 12.5 g, Intravenous, PRN    dextrose 10%, 25 g, Intravenous, PRN    glucagon (human recombinant), 1 mg, Intramuscular, PRN    glucose, 16 g, Oral, PRN    glucose, 24 g, Oral, PRN    insulin aspart U-100, 0-10 Units, Subcutaneous, QID (AC + HS) PRN    Flushing PICC/Midline Protocol, , , Until Discontinued **AND** sodium chloride 0.9%, 10 mL, Intravenous, Q6H **AND** sodium chloride 0.9%, 10 mL, Intravenous, PRN    Review of patient's allergies indicates:   Allergen Reactions    Bumex [bumetanide] Hives    Torsemide Hives     Objective:     Vital Signs (Most Recent):  Temp: 97.8 °F (36.6 °C) (05/21/24 1106)  Pulse: (!) 111 (05/21/24 1227)  Resp: 18 (05/21/24 1106)  BP: (!) 82/0 (05/21/24 0845)  SpO2: 99 % (05/21/24 1106) Vital Signs (24h Range):  Temp:  [97.5 °F (36.4 °C)-98.6 °F (37 °C)] 97.8 °F (36.6 °C)  Pulse:  [] 111  Resp:  [17-24] 18  SpO2:  [95 %-100 %] 99 %  BP: (78-86)/(0) 82/0      Patient Vitals for the past 72 hrs (Last 3 readings):   Weight   05/21/24 0748 84.5 kg (186 lb 4.6 oz)   05/21/24 0437 84.6 kg (186 lb 8.2 oz)   05/20/24 1513 81.6 kg (180 lb)     Body mass index is 23.28 kg/m².      Intake/Output Summary (Last 24 hours) at 5/21/2024 1231  Last data filed at 5/21/2024 0930  Gross per 24 hour   Intake 720 ml   Output 3700 ml   Net -2980 ml       Hemodynamic Parameters:            Physical Exam  Constitutional:       General: He is not in acute distress.     Appearance: Normal appearance.   HENT:      Head: Normocephalic and atraumatic.   Eyes:      Conjunctiva/sclera: Conjunctivae normal.   Neck:      Vascular: JVD present.      Comments: JVP to jaw at 45 degrees  Cardiovascular:      Comments: VAD hum appreciated  Pulmonary:      Breath sounds: Normal breath sounds.      Comments: Clear to auscultation  Abdominal:      Comments: Soft, non-tender, non-distended   Musculoskeletal:      Cervical back: Normal range of motion.      Comments: Bilateral LE pitting edema.    Skin:     General: Skin is warm and dry.      Capillary Refill: Capillary refill takes less than 2 seconds.   Neurological:      Mental Status: He is alert and oriented to person, place, and time.   Psychiatric:         Mood and Affect: Mood and affect normal.            Significant Labs:  CBC:  Recent Labs   Lab 05/20/24  1422 05/20/24  1550 05/20/24  1555 05/21/24  0422   WBC 6.11 6.16  --  6.27   RBC 4.34* 4.40*  --  3.98*   HGB 7.6* 7.5*  --  6.9*   HCT 27.1* 28.2* 30* 25.0*    265  --  256   MCV 62* 64*  --  63*   MCH 17.5* 17.0*  --  17.3*   MCHC 28.0* 26.6*  --  27.6*     BNP:  Recent Labs   Lab 05/20/24  1422 05/20/24  1550   BNP 1,148* 1,168*     CMP:  Recent Labs   Lab 05/20/24  1422 05/20/24  1550 05/21/24  0422   * 63* 268*   CALCIUM 8.9 8.8 8.6*   ALBUMIN 2.9* 3.0* 2.8*   PROT 8.5* 8.5* 8.2   * 138 135*   K 3.0* 3.3* 3.4*   CO2 23 25 23    107 101   BUN 12 12 13   CREATININE  1.0 1.0 1.0   ALKPHOS 167* 170* 163*   ALT 10 11 10   AST 23 23 24   BILITOT 1.9* 1.9* 1.8*      Coagulation:   Recent Labs   Lab 05/20/24  1422 05/20/24  1550 05/21/24  0422   INR 5.2* 5.4* 4.1*   APTT  --   --  37.4*     LDH:  Recent Labs   Lab 05/20/24  1422 05/20/24  1550 05/21/24  0422   * 295* 281*     Microbiology:  Microbiology Results (last 7 days)       Procedure Component Value Units Date/Time    Aerobic culture [[id number]] Collected: 05/20/24 1653    Order Status: Completed Specimen: Wound from Abdomen Updated: 05/21/24 0802     Aerobic Bacterial Culture No growth    Narrative:      LEXX ALVAREZ    Culture, Anaerobe [[id number]] Collected: 05/20/24 1653    Order Status: Completed Specimen: Wound from Abdomen Updated: 05/21/24 0727     Anaerobic Culture Culture in progress    Narrative:      LEXX ALVAREZ    Gram stain [[id number]] Collected: 05/20/24 1653    Order Status: Completed Specimen: Wound from Abdomen Updated: 05/20/24 1957     Gram Stain Result No WBC's      No organisms seen    Narrative:      LEXX ALVAREZ            I have reviewed all pertinent labs within the past 24 hours.    Estimated Creatinine Clearance: 118.5 mL/min (based on SCr of 1 mg/dL).    Diagnostic Results:  I have reviewed all pertinent imaging results/findings within the past 24 hours.

## 2024-05-21 NOTE — PT/OT/SLP PROGRESS
Occupational Therapy      Patient Name:  Kevan Queen   MRN:  [id number]    Patient asleep during OT attempts at 1345 and 1430. Will follow-up as appropriate for initial evaluation.    5/21/2024

## 2024-05-21 NOTE — PROGRESS NOTES
[facility] - [facility] Stepdown  Cardiothoracic Surgery  Evaluation and Management/VAD interrogation       Patient Name: Kevan Queen  MRN: [id number]  Admission Date: 5/20/2024  Hospital Length of Stay: 1 days  Code Status: Full Code   Attending Physician: Marlo Marques MD   Referring Provider: Self, Aaareferral  Principal Problem:Acute on chronic combined systolic and diastolic heart failure    Subjective:     Post-Op Info:  * No surgery found *           Interval History: NAEON. Admitted from clinic with increased volume and malfunctioning PICC line.     Implant 6/29/22    Medications:  Continuous Infusions:   milrinone 20mg/100ml D5W (200mcg/ml)  0.25 mcg/kg/min Intravenous Continuous 7.1 mL/hr at 05/21/24 0905 0.25 mcg/kg/min at 05/21/24 0905     Scheduled Meds:   amLODIPine  5 mg Oral Daily    aspirin  81 mg Oral Daily    atorvastatin  40 mg Oral Daily    cefadroxil  500 mg Oral Q12H    furosemide (LASIX) injection  80 mg Intravenous Q8H    insulin aspart U-100  17 Units Subcutaneous TIDWM    insulin glargine U-100  15 Units Subcutaneous Daily    insulin glargine U-100  15 Units Subcutaneous QHS    levETIRAcetam  1,500 mg Oral BID    magnesium oxide  400 mg Oral Daily    mirtazapine  15 mg Oral Nightly    pantoprazole  40 mg Oral Daily    phytonadione  1 mg Oral Once    sodium chloride 0.9%  10 mL Intravenous Q6H    spironolactone  25 mg Oral Daily     PRN Meds:  Current Facility-Administered Medications:     acetaminophen, 650 mg, Oral, Q6H PRN    cyclobenzaprine, 5 mg, Oral, TID PRN    dextrose 10%, 12.5 g, Intravenous, PRN    dextrose 10%, 25 g, Intravenous, PRN    glucagon (human recombinant), 1 mg, Intramuscular, PRN    glucose, 16 g, Oral, PRN    glucose, 24 g, Oral, PRN    insulin aspart U-100, 0-10 Units, Subcutaneous, QID (AC + HS) PRN    Flushing PICC/Midline Protocol, , , Until Discontinued **AND** sodium chloride 0.9%, 10 mL, Intravenous, Q6H **AND** sodium chloride 0.9%, 10 mL,  "Intravenous, PRN     Objective:     Vital Signs (Most Recent):  Temp: 97.8 °F (36.6 °C) (05/21/24 1106)  Pulse: 70 (05/21/24 1106)  Resp: 18 (05/21/24 1106)  BP: (!) 82/0 (05/21/24 0845)  SpO2: 99 % (05/21/24 1106) Vital Signs (24h Range):  Temp:  [97.5 °F (36.4 °C)-98.6 °F (37 °C)] 97.8 °F (36.6 °C)  Pulse:  [] 70  Resp:  [17-24] 18  SpO2:  [95 %-100 %] 99 %  BP: (78-86)/(0) 82/0       Intake/Output Summary (Last 24 hours) at 5/21/2024 1152  Last data filed at 5/21/2024 0930  Gross per 24 hour   Intake 720 ml   Output 3700 ml   Net -2980 ml       Physical Exam  Vitals reviewed.   Constitutional:       Appearance: He is well-developed.   HENT:      Head: Normocephalic and atraumatic.   Cardiovascular:      Comments: VAD  Pulmonary:      Effort: Pulmonary effort is normal. No respiratory distress.   Skin:     Coloration: Skin is not pale.   Neurological:      General: No focal deficit present.      Mental Status: He is alert.   Psychiatric:         Mood and Affect: Mood normal.     Significant Labs:  ABGs: No results for input(s): "PH", "PCO2", "PO2", "HCO3", "POCSATURATED", "BE" in the last 48 hours.  Amylase: No results for input(s): "AMYLASE" in the last 48 hours.  BMP:   Recent Labs   Lab 05/21/24 0422   *   *   K 3.4*      CO2 23   BUN 13   CREATININE 1.0   CALCIUM 8.6*   MG 1.9     Cardiac markers:   Recent Labs   Lab 05/20/24  1550   TROPONINI 0.018     CBC:   Recent Labs   Lab 05/21/24 0422   WBC 6.27   RBC 3.98*   HGB 6.9*   HCT 25.0*      MCV 63*   MCH 17.3*   MCHC 27.6*     CMP:   Recent Labs   Lab 05/21/24 0422   *   CALCIUM 8.6*   ALBUMIN 2.8*   PROT 8.2   *   K 3.4*   CO2 23      BUN 13   CREATININE 1.0   ALKPHOS 163*   ALT 10   AST 24   BILITOT 1.8*     Coagulation:   Recent Labs   Lab 05/21/24 0422   INR 4.1*   APTT 37.4*     Lactic Acid: No results for input(s): "LACTATE" in the last 48 hours.  LFTs:   Recent Labs   Lab 05/21/24 0422   ALT 10 " "  AST 24   ALKPHOS 163*   BILITOT 1.8*   PROT 8.2   ALBUMIN 2.8*     Lipase: No results for input(s): "LIPASE" in the last 48 hours.    Significant Diagnostics:  I have reviewed all pertinent imaging results/findings within the past 24 hours.    Procedure: Device Interrogation Including analysis of device parameters  Current Settings: Ventricular Assist Device  Review of device function is stable      5/21/2024     8:34 AM 5/21/2024     4:14 AM 5/20/2024    11:51 PM 5/20/2024     8:42 PM 5/20/2024     3:52 PM 4/27/2024    12:08 AM 4/26/2024     8:49 PM   TXP LVAD INTERROGATIONS   Type HeartMate3 HeartMate3 HeartMate3 HeartMate3 HeartMate3 HeartMate3 HeartMate3   Flow 4.4 4.3 4.1 4.2 4.2 4.3 4.4   Speed 5100 5150 5150 5400 5100 5100 5100   PI 4.5 4.4 4.7 4.2 4.5 4.8 4.3   Power (Serna) 3.6 3.6 3.6 3.6 3.6 3.6 3.6   LSL 4700  4700       Pulsatility No Pulse  No Pulse No Pulse No Pulse Intermittent pulse Intermittent pulse        Assessment/Plan:     LVAD (left ventricular assist device) present  - Interval History was obtained from team member  during rounding today.  - VAD/ANABELLE teaching was performed with patient.  - Mobilization/Physical Therapy is ongoing.  - Anticoagulation held INR 4.1  - Admitted with volume overload and malfunctioning PICC   - PICC replaced with some bleeding from site   - WBC 6.27  - H/H 6.9/25  - BUN/Creatinine 13/1  -   - Net negative 2.7L / made 3.2L     More than 50 percent of the care dominated counseling and coordinating care with different team members. The VAD was interrogated and any significant findings noted above.         Rajani Jackson PA-C  Cardiothoracic Surgery  [facility] - [facility]  "

## 2024-05-21 NOTE — PLAN OF CARE
Problem: Adult Inpatient Plan of Care  Goal: Plan of Care Review  Outcome: Progressing  Goal: Patient-Specific Goal (Individualized)  Outcome: Progressing  Goal: Absence of Hospital-Acquired Illness or Injury  Outcome: Progressing  Goal: Optimal Comfort and Wellbeing  Outcome: Progressing  Goal: Readiness for Transition of Care  Outcome: Progressing     Problem: Diabetes Comorbidity  Goal: Blood Glucose Level Within Targeted Range  Outcome: Progressing     Problem: Infection  Goal: Absence of Infection Signs and Symptoms  Outcome: Progressing     Problem: Fall Injury Risk  Goal: Absence of Fall and Fall-Related Injury  Outcome: Progressing

## 2024-05-21 NOTE — NURSING
Patients PICC line bleeding at site, notified on call . Received order to hold pressure at site and if bleeding continues to call back.

## 2024-05-21 NOTE — CONSULTS
"[facility] - [facility] Stepdown  Adult Nutrition  Consult Note    SUMMARY     Recommendations  1. Continue Diabetic diet (2000 calorie) -Fluid per MD   2. RD following    Goals: Meet % of EEN/EPN by RD f/u date  Nutrition Goal Status: progressing   Communication of RD Recs: POC    Assessment and Plan    Nutrition Problem  Increased protein needs    Related to (etiology):   Physiological needs     Signs and Symptoms (as evidenced by):   HF    Interventions (treatment strategy):  Collaboration of nutrition care w/ other providers     Nutrition Diagnosis Status:   New        Reason for Assessment    Reason For Assessment: consult  Diagnosis: cardiac disease  Relevant Medical History: CHF, LVAD  Interdisciplinary Rounds: attended  General Information Comments: Spoke w/ pt at bedside, reports a good appetite currently and PTA. 100% meal consumption noted. Pt denies any issues w/ chewing/swallowing at this time. No n/v. NFPE completed, pt does not meet criteria for malnutrition at this time.  Nutrition Discharge Planning: Diabetic/cardiac diet education will be provided during RD f/u.    Nutrition Risk Screen    Nutrition Risk Screen: no indicators present    Nutrition/Diet History    Food Allergies: other (see comments) (bumex)    Anthropometrics    Temp: 97.8 °F (36.6 °C)  Height Method: Stated  Height: 6' 3" (190.5 cm)  Height (inches): 75 in  Weight Method: Standard Scale  Weight: 84.5 kg (186 lb 4.6 oz)  Weight (lb): 186.29 lb  Ideal Body Weight (IBW), Male: 196 lb  % Ideal Body Weight, Male (lb): 95.16 %  BMI (Calculated): 23.3  BMI Grade: 18.5-24.9 - normal       Lab/Procedures/Meds    Pertinent Labs Reviewed: reviewed  Pertinent Labs Comments: Sodium 135, Potassium 3.4, GFR 54.2,, Glucose 268, Phosphorus 1.9  Pertinent Medications Reviewed: reviewed  Pertinent Medications Comments: mirtazipine    Estimated/Assessed Needs    Weight Used For Calorie Calculations: 84.5 kg (186 lb 4.6 oz)  Energy Calorie " Requirements (kcal): 1845  Energy Need Method: [place]-St Jeor (PAL 1.0)  Protein Requirements: 84 g (1.0 g/kg)  Weight Used For Protein Calculations: 84.5 kg (186 lb 4.6 oz)        RDA Method (mL): 1845         Nutrition Prescription Ordered    Current Diet Order: Diabetic diet 2000 calorie    Evaluation of Received Nutrient/Fluid Intake    I/O: -  Energy Calories Required: meeting needs  Protein Required: meeting needs  Fluid Required: meeting needs  Total Fluid Intake (mL/kg): 1 ml or fluid per MD  Tolerance: tolerating  % Intake of Estimated Energy Needs: 75 - 100 %  % Meal Intake: 75 - 100 %    Nutrition Risk    Level of Risk/Frequency of Follow-up: low ((1x/week))       Monitor and Evaluation    Food and Nutrient Intake: energy intake, food and beverage intake  Food and Nutrient Adminstration: diet order  Knowledge/Beliefs/Attitudes: food and nutrition knowledge/skill, beliefs and attitudes  Physical Activity and Function: nutrition-related ADLs and IADLs, factors affecting access to physical activity  Anthropometric Measurements: height/length, weight, weight change, body mass index, growth pattern indices/percentile ranks  Biochemical Data, Medical Tests and Procedures: electrolyte and renal panel, gastrointestinal profile, glucose/endocrine profile, inflammatory profile, other (specify), lipid profile  Nutrition-Focused Physical Findings: overall appearance, extremities, muscles and bones, head and eyes, skin       Nutrition Follow-Up    RD Follow-up?: Yes

## 2024-05-22 ENCOUNTER — DOCUMENTATION ONLY (OUTPATIENT)
Dept: CARDIOTHORACIC SURGERY | Facility: CLINIC | Age: 39
End: 2024-05-22
Payer: MEDICAID

## 2024-05-22 LAB
ALBUMIN SERPL BCP-MCNC: 2.8 G/DL (ref 3.5–5.2)
ALP SERPL-CCNC: 174 U/L (ref 55–135)
ALT SERPL W/O P-5'-P-CCNC: 8 U/L (ref 10–44)
ANION GAP SERPL CALC-SCNC: 8 MMOL/L (ref 8–16)
APTT PPP: 33.3 SEC (ref 21–32)
AST SERPL-CCNC: 28 U/L (ref 10–40)
BASOPHILS # BLD AUTO: 0.02 K/UL (ref 0–0.2)
BASOPHILS NFR BLD: 0.3 % (ref 0–1.9)
BILIRUB SERPL-MCNC: 1.7 MG/DL (ref 0.1–1)
BNP SERPL-MCNC: 777 PG/ML (ref 0–99)
BUN SERPL-MCNC: 12 MG/DL (ref 6–20)
CALCIUM SERPL-MCNC: 8.9 MG/DL (ref 8.7–10.5)
CHLORIDE SERPL-SCNC: 99 MMOL/L (ref 95–110)
CO2 SERPL-SCNC: 30 MMOL/L (ref 23–29)
CREAT SERPL-MCNC: 0.9 MG/DL (ref 0.5–1.4)
DIFFERENTIAL METHOD BLD: ABNORMAL
EOSINOPHIL # BLD AUTO: 0.1 K/UL (ref 0–0.5)
EOSINOPHIL NFR BLD: 1.6 % (ref 0–8)
ERYTHROCYTE [DISTWIDTH] IN BLOOD BY AUTOMATED COUNT: 24.9 % (ref 11.5–14.5)
EST. GFR  (NO RACE VARIABLE): >60 ML/MIN/1.73 M^2
GLUCOSE SERPL-MCNC: 227 MG/DL (ref 70–110)
HCT VFR BLD AUTO: 25.7 % (ref 40–54)
HGB BLD-MCNC: 7.1 G/DL (ref 14–18)
IMM GRANULOCYTES # BLD AUTO: 0.02 K/UL (ref 0–0.04)
IMM GRANULOCYTES NFR BLD AUTO: 0.3 % (ref 0–0.5)
INR PPP: 2.2 (ref 0.8–1.2)
LDH SERPL L TO P-CCNC: 283 U/L (ref 110–260)
LYMPHOCYTES # BLD AUTO: 1.3 K/UL (ref 1–4.8)
LYMPHOCYTES NFR BLD: 18.9 % (ref 18–48)
MAGNESIUM SERPL-MCNC: 1.9 MG/DL (ref 1.6–2.6)
MAGNESIUM SERPL-MCNC: 1.9 MG/DL (ref 1.6–2.6)
MCH RBC QN AUTO: 17.2 PG (ref 27–31)
MCHC RBC AUTO-ENTMCNC: 27.6 G/DL (ref 32–36)
MCV RBC AUTO: 62 FL (ref 82–98)
MONOCYTES # BLD AUTO: 0.6 K/UL (ref 0.3–1)
MONOCYTES NFR BLD: 8.4 % (ref 4–15)
NEUTROPHILS # BLD AUTO: 4.8 K/UL (ref 1.8–7.7)
NEUTROPHILS NFR BLD: 70.5 % (ref 38–73)
NRBC BLD-RTO: 0 /100 WBC
PLATELET # BLD AUTO: 268 K/UL (ref 150–450)
PMV BLD AUTO: 8.3 FL (ref 9.2–12.9)
POCT GLUCOSE: 101 MG/DL (ref 70–110)
POCT GLUCOSE: 231 MG/DL (ref 70–110)
POCT GLUCOSE: 248 MG/DL (ref 70–110)
POCT GLUCOSE: 365 MG/DL (ref 70–110)
POCT GLUCOSE: 93 MG/DL (ref 70–110)
POTASSIUM SERPL-SCNC: 3.4 MMOL/L (ref 3.5–5.1)
POTASSIUM SERPL-SCNC: 3.8 MMOL/L (ref 3.5–5.1)
PROT SERPL-MCNC: 8.4 G/DL (ref 6–8.4)
PROTHROMBIN TIME: 23.3 SEC (ref 9–12.5)
RBC # BLD AUTO: 4.12 M/UL (ref 4.6–6.2)
SODIUM SERPL-SCNC: 137 MMOL/L (ref 136–145)
WBC # BLD AUTO: 6.87 K/UL (ref 3.9–12.7)

## 2024-05-22 PROCEDURE — 83880 ASSAY OF NATRIURETIC PEPTIDE: CPT | Performed by: STUDENT IN AN ORGANIZED HEALTH CARE EDUCATION/TRAINING PROGRAM

## 2024-05-22 PROCEDURE — 85730 THROMBOPLASTIN TIME PARTIAL: CPT | Performed by: STUDENT IN AN ORGANIZED HEALTH CARE EDUCATION/TRAINING PROGRAM

## 2024-05-22 PROCEDURE — 93750 INTERROGATION VAD IN PERSON: CPT | Mod: ,,, | Performed by: INTERNAL MEDICINE

## 2024-05-22 PROCEDURE — 85025 COMPLETE CBC W/AUTO DIFF WBC: CPT | Performed by: STUDENT IN AN ORGANIZED HEALTH CARE EDUCATION/TRAINING PROGRAM

## 2024-05-22 PROCEDURE — 83615 LACTATE (LD) (LDH) ENZYME: CPT | Performed by: STUDENT IN AN ORGANIZED HEALTH CARE EDUCATION/TRAINING PROGRAM

## 2024-05-22 PROCEDURE — 99232 SBSQ HOSP IP/OBS MODERATE 35: CPT | Mod: ,,,

## 2024-05-22 PROCEDURE — 84132 ASSAY OF SERUM POTASSIUM: CPT | Performed by: STUDENT IN AN ORGANIZED HEALTH CARE EDUCATION/TRAINING PROGRAM

## 2024-05-22 PROCEDURE — 25000003 PHARM REV CODE 250: Performed by: INTERNAL MEDICINE

## 2024-05-22 PROCEDURE — 99233 SBSQ HOSP IP/OBS HIGH 50: CPT | Mod: ,,, | Performed by: INTERNAL MEDICINE

## 2024-05-22 PROCEDURE — A4216 STERILE WATER/SALINE, 10 ML: HCPCS | Performed by: INTERNAL MEDICINE

## 2024-05-22 PROCEDURE — 94761 N-INVAS EAR/PLS OXIMETRY MLT: CPT

## 2024-05-22 PROCEDURE — 80053 COMPREHEN METABOLIC PANEL: CPT | Performed by: STUDENT IN AN ORGANIZED HEALTH CARE EDUCATION/TRAINING PROGRAM

## 2024-05-22 PROCEDURE — 20600001 HC STEP DOWN PRIVATE ROOM

## 2024-05-22 PROCEDURE — 85610 PROTHROMBIN TIME: CPT | Performed by: STUDENT IN AN ORGANIZED HEALTH CARE EDUCATION/TRAINING PROGRAM

## 2024-05-22 PROCEDURE — 27000248 HC VAD-ADDITIONAL DAY

## 2024-05-22 PROCEDURE — 63600175 PHARM REV CODE 636 W HCPCS: Performed by: STUDENT IN AN ORGANIZED HEALTH CARE EDUCATION/TRAINING PROGRAM

## 2024-05-22 PROCEDURE — 25000003 PHARM REV CODE 250: Performed by: STUDENT IN AN ORGANIZED HEALTH CARE EDUCATION/TRAINING PROGRAM

## 2024-05-22 PROCEDURE — 83735 ASSAY OF MAGNESIUM: CPT | Performed by: STUDENT IN AN ORGANIZED HEALTH CARE EDUCATION/TRAINING PROGRAM

## 2024-05-22 RX ORDER — MAGNESIUM SULFATE HEPTAHYDRATE 40 MG/ML
2 INJECTION, SOLUTION INTRAVENOUS ONCE
Status: COMPLETED | OUTPATIENT
Start: 2024-05-22 | End: 2024-05-22

## 2024-05-22 RX ORDER — INSULIN ASPART 100 [IU]/ML
9 INJECTION, SOLUTION INTRAVENOUS; SUBCUTANEOUS
Status: DISCONTINUED | OUTPATIENT
Start: 2024-05-22 | End: 2024-05-22

## 2024-05-22 RX ORDER — POTASSIUM CHLORIDE 750 MG/1
50 CAPSULE, EXTENDED RELEASE ORAL ONCE
Status: COMPLETED | OUTPATIENT
Start: 2024-05-22 | End: 2024-05-22

## 2024-05-22 RX ORDER — POTASSIUM CHLORIDE 20 MEQ/1
40 TABLET, EXTENDED RELEASE ORAL
Status: COMPLETED | OUTPATIENT
Start: 2024-05-22 | End: 2024-05-22

## 2024-05-22 RX ORDER — WARFARIN 3 MG/1
3 TABLET ORAL DAILY
Status: DISCONTINUED | OUTPATIENT
Start: 2024-05-22 | End: 2024-05-23

## 2024-05-22 RX ORDER — INSULIN ASPART 100 [IU]/ML
12 INJECTION, SOLUTION INTRAVENOUS; SUBCUTANEOUS
Status: DISCONTINUED | OUTPATIENT
Start: 2024-05-23 | End: 2024-05-23

## 2024-05-22 RX ORDER — INSULIN GLARGINE 100 [IU]/ML
18 INJECTION, SOLUTION SUBCUTANEOUS 2 TIMES DAILY
Status: DISCONTINUED | OUTPATIENT
Start: 2024-05-22 | End: 2024-05-23

## 2024-05-22 RX ORDER — INSULIN GLARGINE 100 [IU]/ML
20 INJECTION, SOLUTION SUBCUTANEOUS 2 TIMES DAILY
Status: DISCONTINUED | OUTPATIENT
Start: 2024-05-22 | End: 2024-05-22

## 2024-05-22 RX ORDER — INSULIN ASPART 100 [IU]/ML
13 INJECTION, SOLUTION INTRAVENOUS; SUBCUTANEOUS
Status: DISCONTINUED | OUTPATIENT
Start: 2024-05-22 | End: 2024-05-22

## 2024-05-22 RX ADMIN — LEVETIRACETAM 1500 MG: 500 TABLET, FILM COATED ORAL at 09:05

## 2024-05-22 RX ADMIN — INSULIN ASPART 13 UNITS: 100 INJECTION, SOLUTION INTRAVENOUS; SUBCUTANEOUS at 01:05

## 2024-05-22 RX ADMIN — CEFADROXIL 500 MG: 500 CAPSULE ORAL at 08:05

## 2024-05-22 RX ADMIN — FUROSEMIDE 80 MG: 10 INJECTION, SOLUTION INTRAVENOUS at 01:05

## 2024-05-22 RX ADMIN — WARFARIN SODIUM 3 MG: 3 TABLET ORAL at 04:05

## 2024-05-22 RX ADMIN — INSULIN ASPART 6 UNITS: 100 INJECTION, SOLUTION INTRAVENOUS; SUBCUTANEOUS at 08:05

## 2024-05-22 RX ADMIN — CEFADROXIL 500 MG: 500 CAPSULE ORAL at 09:05

## 2024-05-22 RX ADMIN — POTASSIUM CHLORIDE 40 MEQ: 1500 TABLET, EXTENDED RELEASE ORAL at 08:05

## 2024-05-22 RX ADMIN — POTASSIUM CHLORIDE 40 MEQ: 1500 TABLET, EXTENDED RELEASE ORAL at 06:05

## 2024-05-22 RX ADMIN — SPIRONOLACTONE 25 MG: 25 TABLET ORAL at 08:05

## 2024-05-22 RX ADMIN — INSULIN ASPART 9 UNITS: 100 INJECTION, SOLUTION INTRAVENOUS; SUBCUTANEOUS at 05:05

## 2024-05-22 RX ADMIN — INSULIN ASPART 2 UNITS: 100 INJECTION, SOLUTION INTRAVENOUS; SUBCUTANEOUS at 09:05

## 2024-05-22 RX ADMIN — MAGNESIUM SULFATE HEPTAHYDRATE 2 G: 40 INJECTION, SOLUTION INTRAVENOUS at 06:05

## 2024-05-22 RX ADMIN — ASPIRIN 81 MG: 81 TABLET, COATED ORAL at 08:05

## 2024-05-22 RX ADMIN — AMLODIPINE BESYLATE 5 MG: 5 TABLET ORAL at 08:05

## 2024-05-22 RX ADMIN — Medication 10 ML: at 12:05

## 2024-05-22 RX ADMIN — INSULIN ASPART 17 UNITS: 100 INJECTION, SOLUTION INTRAVENOUS; SUBCUTANEOUS at 08:05

## 2024-05-22 RX ADMIN — MILRINONE LACTATE IN DEXTROSE 0.25 MCG/KG/MIN: 200 INJECTION, SOLUTION INTRAVENOUS at 08:05

## 2024-05-22 RX ADMIN — ATORVASTATIN CALCIUM 40 MG: 20 TABLET, FILM COATED ORAL at 08:05

## 2024-05-22 RX ADMIN — Medication 400 MG: at 08:05

## 2024-05-22 RX ADMIN — Medication 10 ML: at 05:05

## 2024-05-22 RX ADMIN — PANTOPRAZOLE SODIUM 40 MG: 40 TABLET, DELAYED RELEASE ORAL at 08:05

## 2024-05-22 RX ADMIN — CYCLOBENZAPRINE HYDROCHLORIDE 5 MG: 5 TABLET, FILM COATED ORAL at 12:05

## 2024-05-22 RX ADMIN — POTASSIUM CHLORIDE 50 MEQ: 10 CAPSULE, COATED, EXTENDED RELEASE ORAL at 06:05

## 2024-05-22 RX ADMIN — INSULIN GLARGINE 18 UNITS: 100 INJECTION, SOLUTION SUBCUTANEOUS at 09:05

## 2024-05-22 RX ADMIN — MIRTAZAPINE 15 MG: 15 TABLET, FILM COATED ORAL at 09:05

## 2024-05-22 RX ADMIN — FUROSEMIDE 80 MG: 10 INJECTION, SOLUTION INTRAVENOUS at 09:05

## 2024-05-22 RX ADMIN — LEVETIRACETAM 1500 MG: 500 TABLET, FILM COATED ORAL at 08:05

## 2024-05-22 RX ADMIN — FUROSEMIDE 80 MG: 10 INJECTION, SOLUTION INTRAVENOUS at 05:05

## 2024-05-22 RX ADMIN — INSULIN GLARGINE 20 UNITS: 100 INJECTION, SOLUTION SUBCUTANEOUS at 08:05

## 2024-05-22 NOTE — PLAN OF CARE
Problem: Adult Inpatient Plan of Care  Goal: Plan of Care Review  Outcome: Progressing  Goal: Patient-Specific Goal (Individualized)  Outcome: Progressing  Goal: Absence of Hospital-Acquired Illness or Injury  Outcome: Progressing  Goal: Optimal Comfort and Wellbeing  Outcome: Progressing  Goal: Readiness for Transition of Care  Outcome: Progressing     Problem: Diabetes Comorbidity  Goal: Blood Glucose Level Within Targeted Range  Outcome: Progressing     Problem: Infection  Goal: Absence of Infection Signs and Symptoms  Outcome: Progressing     Problem: Fall Injury Risk  Goal: Absence of Fall and Fall-Related Injury  Outcome: Progressing     Problem: Ventricular Assist Device  Goal: Optimal Adjustment to Device  Outcome: Progressing  Goal: Absence of Bleeding  Outcome: Progressing  Goal: Absence of Embolism Signs and Symptoms  Outcome: Progressing  Goal: Optimal Blood Flow  Outcome: Progressing  Goal: Absence of Infection Signs and Symptoms  Outcome: Progressing  Goal: Effective Right-Sided Heart Function  Outcome: Progressing     Problem: Ventricular Assist Device  Goal: Optimal Adjustment to Device  Outcome: Progressing  Goal: Absence of Bleeding  Outcome: Progressing  Goal: Absence of Embolism Signs and Symptoms  Outcome: Progressing  Goal: Optimal Blood Flow  Outcome: Progressing  Goal: Absence of Infection Signs and Symptoms  Outcome: Progressing  Goal: Effective Right-Sided Heart Function  Outcome: Progressing

## 2024-05-22 NOTE — PROGRESS NOTES
Pt currently admitted in the hospital.  I met with him at the bedside for his 24 month follow-up. Pt verbalized consent and willingness to continue to participate with the [facility] registry.      Patient completed the EQ-5D quality of life questionnaire, KCCQ, and the Trail [employer] neurocognitive test in 148 seconds.

## 2024-05-22 NOTE — SUBJECTIVE & OBJECTIVE
"Interval HPI:   No acute events overnight. Patient in room 304/304 A. Blood glucose stable. BG above goal on current insulin regimen (SSI, prandial, and basal insulin ). Steroid use- None.   Renal function-   Lab Results   Component Value Date    CREATININE 0.9 2024        Vasopressors-  None      Diet diabetic 2000 Calorie; Fluid - 1500mL      Eatin%  Nausea: No  Hypoglycemia and intervention: No  Fever: No  TPN and/or TF: No    BP (!) 80/0 (BP Location: Right arm, Patient Position: Lying)   Pulse 65   Temp 97.6 °F (36.4 °C) (Oral)   Resp 14   Ht 6' 3" (1.905 m)   Wt 84.5 kg (186 lb 4.6 oz)   SpO2 98%   BMI 23.28 kg/m²     Labs Reviewed and Include    Recent Labs   Lab 24  0424   *   CALCIUM 8.9   ALBUMIN 2.8*   PROT 8.4      K 3.4*   CO2 30*   CL 99   BUN 12   CREATININE 0.9   ALKPHOS 174*   ALT 8*   AST 28   BILITOT 1.7*     Lab Results   Component Value Date    WBC 6.87 2024    HGB 7.1 (L) 2024    HCT 25.7 (L) 2024    MCV 62 (L) 2024     2024     No results for input(s): "TSH", "FREET4" in the last 168 hours.  Lab Results   Component Value Date    HGBA1C >14.0 (H) 2024       Nutritional status:   Body mass index is 23.28 kg/m².  Lab Results   Component Value Date    ALBUMIN 2.8 (L) 2024    ALBUMIN 2.8 (L) 2024    ALBUMIN 3.0 (L) 2024     Lab Results   Component Value Date    PREALBUMIN 8 (L) 2024    PREALBUMIN 12 (L) 2024    PREALBUMIN 13 (L) 2024       Estimated Creatinine Clearance: 131.7 mL/min (based on SCr of 0.9 mg/dL).    Accu-Checks  Recent Labs     24  2131 24  0754 24  1137 24  1654 24  1931 24  2110 24  0022 24  0758   POCTGLUCOSE 182* 335* 306* 280* 360* 399* 365* 248*       Current Medications and/or Treatments Impacting Glycemic Control  Immunotherapy:    Immunosuppressants       None          Steroids:   Hormones (From admission, " onward)      None          Pressors:    Autonomic Drugs (From admission, onward)      None          Hyperglycemia/Diabetes Medications:   Antihyperglycemics (From admission, onward)      Start     Stop Route Frequency Ordered    05/22/24 0900  insulin glargine U-100 (Lantus) pen 20 Units         -- SubQ 2 times daily 05/22/24 0640    05/21/24 1200  insulin aspart U-100 pen 17 Units         -- SubQ 3 times daily with meals 05/21/24 1150    05/20/24 1822  insulin aspart U-100 pen 0-10 Units         -- SubQ Before meals & nightly PRN 05/20/24 8322

## 2024-05-22 NOTE — ASSESSMENT & PLAN NOTE
- Interval History was obtained from team member  during rounding today.  - VAD/ANABELLE teaching was performed with patient.  - Mobilization/Physical Therapy is ongoing.  - Anticoagulation held INR 2.2  - Admitted with volume overload and malfunctioning PICC   - PICC replaced with some bleeding from site   - WBC 6.8  - H/H 7.1/25  - BUN/Creatinine 12/0.9  -   - Net negative 1.9L   - Remains on milrinone     More than 50 percent of the care dominated counseling and coordinating care with different team members. The VAD was interrogated and any significant findings noted above.

## 2024-05-22 NOTE — PROGRESS NOTES
Admit Note     Met with pt to assess needs. Patient is a 39 y.o. single male, admitted for Shortness of breath [R06.02], Acute on chronic combined systolic and diastolic heart failure [I50.43]  Acute on chronic combined systolic (congestive) and diastolic (congestive) heart failure [I50.43], LVAD (left ventricular assist device) present [Z95.811], Type 2 diabetes mellitus with hyperglycemia, with long-term current use of insulin [E11.65, Z79.4] per medical records.    Pt received LVAD on 6/23/22. The pt's significant other does the dressing changes.      Patient admitted from clinic on 5/20/2024.  At this time, pt presents aaox4 with neutral affect. No caregivers present at time of visit.    Household/Family Systems     Patient resides with significant other, and three children at    [address] [place] [address].      2 hours from [facility]    Pt cell: [phone]    Additional contact:   Niharika Wright (significant other, will start work soon and drives) [phone]    Support system includes significant other, mother and children. The pt has seven children, two with the pt's significant other. The pt's other children live with their mothers. Pt reports family/mother care for children in the home when the pt and significant other are not in attendance.      Patients primary caregiver is self with support from significant other when indicated.    Pt's caregivers/family will visit if possible.    Confirmed patient and patients caregivers do have access to reliable transportation. Pt and significant other both drive.    Cognitive Status/Learning     Patient reports reading ability as college and states patient does not have difficulty with reading, writing, hearing, comprehension, learning, and memory.  Pt reports he continues to have the same difficulty with his eyesight.  Pt reports his eyesight issues do not effect his ability to drive. Patient reports he learns best by multiple methods.    Needed: No.   Highest education level: Attended College/Technical School    Vocation/Disability   .  Working for Income: No  If no, reason not working: medical   Patient used to work as a CNA.  Significant other is currently is working at [employer].   Pt's mother receives monthly income and is able to assist pt financially.   Pt reports he has applied for disability and and a  is assisting with this process.   Pt reports no difficulty at this time covering the cost of monthly bills.     Adherence     Patient reports a high level of adherence to patients health care regimen. Pt reports he is a vegan. Adherence counseling and education provided. Patient verbalizes understanding.    Substance Use    Patient caregiver confirmed the following substance usage.    Tobacco: none, patient denies any use.  Alcohol: none, patient denies any use.  Illicit Drugs/Non-prescribed Medications: no current use. Pt does have a history of drug use. Pt stopped using Marijuana on 11/2021, cocaine on 7/2021 and ecstasy on 9/2020.    Patient states clear understanding of the potential impact of substance use.  Substance abstinence/cessation counseling, education and resources provided and reviewed.     Services Utilizing/ADLS    Infusion Service: Prior to admission, patient utilizing? yes [facility] for home Milrinone.  Pt's significant other Page does the IV dressing changes.   Pt would like to use the same IV company when discharged. [phone], fax [phone]    Home Health: Prior to admission, patient utilizing? no    DME: Prior to admission, no    Pulmonary/Cardiac Rehab: Prior to admission, no    Dialysis:  Prior to admission, no    Transplant Specialty Pharmacy:  Prior to admission, no.    Prior to admission, patient's caregiver reports patient was independent with ADLS and was driving.  Patient reports patient is able to care for self at this time..  Patient indicates a willingness to care for self once  medically cleared to do so.    Insurance/Medications    Insured by   Payer/Plan Subscr  Sex Relation Sub. Ins. ID Effective Group Num   1. MEDICAID - HE* JOSE J DAMICO 1985 Male Self [id number]* 3/1/20 [id number]                                   [address] [address]      Primary Insurance (for UNOS reporting): Public Insurance - Medicaid  Secondary Insurance (for UNOS reporting): None    Patient caregiver reports patient is able to obtain and afford medications at this time and at time of discharge. Pt reports his monthly medications are running about $8.00    Living Will/Healthcare Power of     Patient's caregiver confirmed patient has a LW and/or HCPA.  Pt reports his mother is his HCPA.   provided education regarding LW and HCPA and the completion of forms.    Coping/Mental Health    Per pt, pt is coping adequately with the aid of  family members. Patient indicated mental ailyn difficulties in the past. Pt reports no needs or concerns at this time and hopes to be able to go home soon.      Discharge Planning    At time of discharge, patient plans to return to patient's home under the care of self, significant other and mother.  Patients significant other will transport patient.  Per rounds today, expected discharge date  will be later this week . Patient verbalizes understanding and is involved in treatment planning and discharge process.    Additional Concerns    Patient is being followed for needs, education, resources, information, emotional support, supportive counseling, and for supportive and skilled discharge plan of care.  providing ongoing psychosocial support, education, resources and d/c planning as needed.  SW remains available. Patient denies additional needs and/or concerns at this time. Patient verbalizes understanding and agreement with information reviewed, social work availability, and how to access available resources as needed.

## 2024-05-22 NOTE — PROGRESS NOTES
05/22/2024  John Alaniz    Current provider:  Luca Lopez Jr.*    Device interrogation:      5/22/2024     4:47 PM 5/22/2024    12:21 PM 5/22/2024     7:55 AM 5/22/2024     4:14 AM 5/21/2024    11:10 PM 5/21/2024     8:00 PM 5/21/2024     4:10 PM   TXP LVAD INTERROGATIONS   Type HeartMate3 HeartMate3 HeartMate3   HeartMate3 HeartMate3   Flow 4.1 4 4.5 4.1 4.3 4.7 4.2   Speed 5100 5100 5150 5100 5150 5100 5100   PI 6.5 6 4.6 5.8 4.9 3.5 5.3   Power (Serna) 3.7 3.6 3.5 3.6 3.7 3.6 3.6   LSL 4700 4700 4700   4700 4700   Pulsatility      No Pulse No Pulse          Rounded on Kevan Queen to ensure all mechanical assist device settings (IABP or VAD) were appropriate and all parameters were within limits.  I was able to ensure all back up equipment was present, the staff had no issues, and the Perfusion Department daily rounding was complete.      For implantable VADs: Interrogation of Ventricular assist device was performed with analysis of device parameters and review of device function. I have personally reviewed the interrogation findings and agree with findings as stated.     In emergency, the nursing units have been notified to contact the perfusion department either by:  Calling [id number] from 630am to 4pm Mon thru Fri, utilizing the On-Call Finder functionality of Epic and searching for Perfusion, or by contacting the hospital  from 4pm to 630am and on weekends and asking to speak with the perfusionist on call.    5:36 PM

## 2024-05-22 NOTE — PROGRESS NOTES
"[facility]  Endocrinology  Progress Note    Admit Date: 2024     Reason for Consult: Management of T2DM, Hyperglycemia      Surgical Procedure and Date: LVAD 2022     Diabetes diagnosis year:      Home Diabetes Medications:   -Levemir 20 units BID   -Novolog 18 units TIDWM in addition to the following correction scale:    150 - 200 + 2 unit    201 - 250 + 4 units    251 - 300 + 6 units    301 - 350 + 8 units       > 350   + 10 units     How often checking glucose at home?  > 4 times per day  BG readings on regimen: 180's-200's  Hypoglycemia on the regimen?  No  Missed doses on regimen?  occasionally skips mealsn and will skip Novolog with breakfast      Diabetes Complications include:     Hyperglycemia     Complicating diabetes co morbidities:   History of MI, CHF, and CKD       Interval HPI:   No acute events overnight. Patient in room 304/304 A. Blood glucose stable. BG above goal on current insulin regimen (SSI, prandial, and basal insulin ). Steroid use- None.   Renal function-   Lab Results   Component Value Date    CREATININE 0.9 2024        Vasopressors-  None      Diet diabetic 2000 Calorie; Fluid - 1500mL      Eatin%  Nausea: No  Hypoglycemia and intervention: No  Fever: No  TPN and/or TF: No    BP (!) 80/0 (BP Location: Right arm, Patient Position: Lying)   Pulse 65   Temp 97.6 °F (36.4 °C) (Oral)   Resp 14   Ht 6' 3" (1.905 m)   Wt 84.5 kg (186 lb 4.6 oz)   SpO2 98%   BMI 23.28 kg/m²     Labs Reviewed and Include    Recent Labs   Lab 24  0424   *   CALCIUM 8.9   ALBUMIN 2.8*   PROT 8.4      K 3.4*   CO2 30*   CL 99   BUN 12   CREATININE 0.9   ALKPHOS 174*   ALT 8*   AST 28   BILITOT 1.7*     Lab Results   Component Value Date    WBC 6.87 2024    HGB 7.1 (L) 2024    HCT 25.7 (L) 2024    MCV 62 (L) 2024     2024     No results for input(s): "TSH", "FREET4" in the last 168 hours.  Lab Results "   Component Value Date    HGBA1C >14.0 (H) 04/26/2024       Nutritional status:   Body mass index is 23.28 kg/m².  Lab Results   Component Value Date    ALBUMIN 2.8 (L) 05/22/2024    ALBUMIN 2.8 (L) 05/21/2024    ALBUMIN 3.0 (L) 05/20/2024     Lab Results   Component Value Date    PREALBUMIN 8 (L) 05/20/2024    PREALBUMIN 12 (L) 04/26/2024    PREALBUMIN 13 (L) 04/24/2024       Estimated Creatinine Clearance: 131.7 mL/min (based on SCr of 0.9 mg/dL).    Accu-Checks  Recent Labs     05/20/24  2131 05/21/24  0754 05/21/24  1137 05/21/24  1654 05/21/24  1931 05/21/24  2110 05/22/24  0022 05/22/24  0758   POCTGLUCOSE 182* 335* 306* 280* 360* 399* 365* 248*       Current Medications and/or Treatments Impacting Glycemic Control  Immunotherapy:    Immunosuppressants       None          Steroids:   Hormones (From admission, onward)      None          Pressors:    Autonomic Drugs (From admission, onward)      None          Hyperglycemia/Diabetes Medications:   Antihyperglycemics (From admission, onward)      Start     Stop Route Frequency Ordered    05/22/24 0900  insulin glargine U-100 (Lantus) pen 20 Units         -- SubQ 2 times daily 05/22/24 0640    05/21/24 1200  insulin aspart U-100 pen 17 Units         -- SubQ 3 times daily with meals 05/21/24 1150    05/20/24 1822  insulin aspart U-100 pen 0-10 Units         -- SubQ Before meals & nightly PRN 05/20/24 1722            ASSESSMENT and PLAN    Cardiac/Vascular  * Acute on chronic combined systolic and diastolic heart failure  Managed per primary team  Avoid hypoglycemia        LVAD (left ventricular assist device) present  Managed per primary team  Avoid hypoglycemia        Endocrine  Type 2 diabetes mellitus with hyperglycemia, with long-term current use of insulin  Endocrinology consulted for BG management.   BG goal 140-180    - Lantus (Insulin Glargine) 20 units BID (20% increase due to fasting blood glucose above goal)  - Novolog (Insulin Aspart) 17 units TIDWM and  prn for BG excursions [facility] SSI (150/25) (20% increase due to prandial blood glucose  above goal)  - BG checks AC/HS  - Hypoglycemia protocol in place      ** Please notify Endocrine for any change and/or advance in diet**  ** Please call Endocrine for any BG related issues **    Discharge Planning:   TBD. Please notify endocrinology prior to discharge.            Susanna Doty PA-C  Endocrinology  [facility]

## 2024-05-22 NOTE — SUBJECTIVE & OBJECTIVE
Interval History: NAEON. No acute complaints. CVP 20 this AM.     Continuous Infusions:   milrinone 20mg/100ml D5W (200mcg/ml)  0.25 mcg/kg/min Intravenous Continuous 7.1 mL/hr at 05/22/24 0814 0.25 mcg/kg/min at 05/22/24 0814     Scheduled Meds:   amLODIPine  5 mg Oral Daily    aspirin  81 mg Oral Daily    atorvastatin  40 mg Oral Daily    cefadroxil  500 mg Oral Q12H    furosemide (LASIX) injection  80 mg Intravenous Q8H    insulin aspart U-100  13 Units Subcutaneous TIDWM    insulin glargine U-100  20 Units Subcutaneous BID    levETIRAcetam  1,500 mg Oral BID    magnesium oxide  400 mg Oral Daily    mirtazapine  15 mg Oral Nightly    pantoprazole  40 mg Oral Daily    sodium chloride 0.9%  10 mL Intravenous Q6H    spironolactone  25 mg Oral Daily     PRN Meds:  Current Facility-Administered Medications:     acetaminophen, 650 mg, Oral, Q6H PRN    cyclobenzaprine, 5 mg, Oral, TID PRN    dextrose 10%, 12.5 g, Intravenous, PRN    dextrose 10%, 25 g, Intravenous, PRN    glucagon (human recombinant), 1 mg, Intramuscular, PRN    glucose, 16 g, Oral, PRN    glucose, 24 g, Oral, PRN    insulin aspart U-100, 0-10 Units, Subcutaneous, QID (AC + HS) PRN    Flushing PICC/Midline Protocol, , , Until Discontinued **AND** sodium chloride 0.9%, 10 mL, Intravenous, Q6H **AND** sodium chloride 0.9%, 10 mL, Intravenous, PRN    Review of patient's allergies indicates:   Allergen Reactions    Bumex [bumetanide] Hives    Torsemide Hives     Objective:     Vital Signs (Most Recent):  Temp: 97.9 °F (36.6 °C) (05/22/24 1108)  Pulse: (!) 118 (05/22/24 1108)  Resp: 20 (05/22/24 1108)  BP: 112/77 (05/22/24 1108)  SpO2: 100 % (05/22/24 1108) Vital Signs (24h Range):  Temp:  [97.6 °F (36.4 °C)-98.4 °F (36.9 °C)] 97.9 °F (36.6 °C)  Pulse:  [] 118  Resp:  [14-20] 20  SpO2:  [95 %-100 %] 100 %  BP: ()/(0-77) 112/77     Patient Vitals for the past 72 hrs (Last 3 readings):   Weight   05/21/24 0748 84.5 kg (186 lb 4.6 oz)   05/21/24  0437 84.6 kg (186 lb 8.2 oz)   05/20/24 1513 81.6 kg (180 lb)     Body mass index is 23.28 kg/m².      Intake/Output Summary (Last 24 hours) at 5/22/2024 1156  Last data filed at 5/22/2024 0759  Gross per 24 hour   Intake 1012 ml   Output 5850 ml   Net -4838 ml       Hemodynamic Parameters:            Physical Exam  Constitutional:       General: He is not in acute distress.     Appearance: Normal appearance.   HENT:      Head: Normocephalic and atraumatic.   Eyes:      Conjunctiva/sclera: Conjunctivae normal.   Neck:      Vascular: JVD present.      Comments: JVP elevated  Cardiovascular:      Comments: VAD hum appreciated  Pulmonary:      Breath sounds: Normal breath sounds.      Comments: Clear to auscultation  Abdominal:      Comments: Soft, non-tender, non-distended   Musculoskeletal:      Cervical back: Normal range of motion.      Comments: Bilateral LE pitting edema.    Skin:     General: Skin is warm and dry.      Capillary Refill: Capillary refill takes less than 2 seconds.   Neurological:      General: No focal deficit present.      Mental Status: He is alert and oriented to person, place, and time.   Psychiatric:         Mood and Affect: Mood and affect normal.            Significant Labs:  CBC:  Recent Labs   Lab 05/20/24  1550 05/20/24  1555 05/21/24  0422 05/22/24  0424   WBC 6.16  --  6.27 6.87   RBC 4.40*  --  3.98* 4.12*   HGB 7.5*  --  6.9* 7.1*   HCT 28.2* 30* 25.0* 25.7*     --  256 268   MCV 64*  --  63* 62*   MCH 17.0*  --  17.3* 17.2*   MCHC 26.6*  --  27.6* 27.6*     BNP:  Recent Labs   Lab 05/20/24  1422 05/20/24  1550 05/22/24  0424   BNP 1,148* 1,168* 777*     CMP:  Recent Labs   Lab 05/20/24  1550 05/21/24  0422 05/21/24  1423 05/22/24  0424   GLU 63* 268*  --  227*   CALCIUM 8.8 8.6*  --  8.9   ALBUMIN 3.0* 2.8*  --  2.8*   PROT 8.5* 8.2  --  8.4    135*  --  137   K 3.3* 3.4* 4.1 3.4*   CO2 25 23  --  30*    101  --  99   BUN 12 13  --  12   CREATININE 1.0 1.0  --   0.9   ALKPHOS 170* 163*  --  174*   ALT 11 10  --  8*   AST 23 24  --  28   BILITOT 1.9* 1.8*  --  1.7*      Coagulation:   Recent Labs   Lab 05/20/24  1550 05/21/24  0422 05/22/24  0424   INR 5.4* 4.1* 2.2*   APTT  --  37.4* 33.3*     LDH:  Recent Labs   Lab 05/20/24  1422 05/20/24  1550 05/21/24  0422 05/22/24  0424   * 295* 281* 283*     Microbiology:  Microbiology Results (last 7 days)       Procedure Component Value Units Date/Time    Culture, Anaerobe [[id number]] Collected: 05/20/24 1653    Order Status: Completed Specimen: Wound from Abdomen Updated: 05/22/24 0946     Anaerobic Culture Culture in progress    Narrative:      LEXX ALVAREZ    Aerobic culture [[id number]] Collected: 05/20/24 1653    Order Status: Completed Specimen: Wound from Abdomen Updated: 05/21/24 0802     Aerobic Bacterial Culture No growth    Narrative:      LEXX ALVAREZ    Gram stain [[id number]] Collected: 05/20/24 1653    Order Status: Completed Specimen: Wound from Abdomen Updated: 05/20/24 1957     Gram Stain Result No WBC's      No organisms seen    Narrative:      LEXX ALVAREZ            I have reviewed all pertinent labs within the past 24 hours.    Estimated Creatinine Clearance: 131.7 mL/min (based on SCr of 0.9 mg/dL).    Diagnostic Results:  I have reviewed all pertinent imaging results/findings within the past 24 hours.

## 2024-05-22 NOTE — PROGRESS NOTES
[facility]  Cardiothoracic Surgery  Evaluation and Management/VAD interrogation         Patient Name: Kevan Queen  MRN: [id number]  Admission Date: 5/20/2024  Hospital Length of Stay: 2 days  Code Status: Full Code   Attending Physician: Luca Lopez Jr.*   Referring Provider: Self, Aaareferral  Principal Problem:Acute on chronic combined systolic and diastolic heart failure    Subjective:     Post-Op Info:  * No surgery found *         Interval History: NAEON. Continues on milrinone.     Implant 6/29/22    Medications:  Continuous Infusions:   milrinone 20mg/100ml D5W (200mcg/ml)  0.25 mcg/kg/min Intravenous Continuous 7.1 mL/hr at 05/22/24 0814 0.25 mcg/kg/min at 05/22/24 0814     Scheduled Meds:   amLODIPine  5 mg Oral Daily    aspirin  81 mg Oral Daily    atorvastatin  40 mg Oral Daily    cefadroxil  500 mg Oral Q12H    furosemide (LASIX) injection  80 mg Intravenous Q8H    insulin aspart U-100  17 Units Subcutaneous TIDWM    insulin glargine U-100  20 Units Subcutaneous BID    levETIRAcetam  1,500 mg Oral BID    magnesium oxide  400 mg Oral Daily    mirtazapine  15 mg Oral Nightly    pantoprazole  40 mg Oral Daily    sodium chloride 0.9%  10 mL Intravenous Q6H    spironolactone  25 mg Oral Daily     PRN Meds:  Current Facility-Administered Medications:     acetaminophen, 650 mg, Oral, Q6H PRN    cyclobenzaprine, 5 mg, Oral, TID PRN    dextrose 10%, 12.5 g, Intravenous, PRN    dextrose 10%, 25 g, Intravenous, PRN    glucagon (human recombinant), 1 mg, Intramuscular, PRN    glucose, 16 g, Oral, PRN    glucose, 24 g, Oral, PRN    insulin aspart U-100, 0-10 Units, Subcutaneous, QID (AC + HS) PRN    Flushing PICC/Midline Protocol, , , Until Discontinued **AND** sodium chloride 0.9%, 10 mL, Intravenous, Q6H **AND** sodium chloride 0.9%, 10 mL, Intravenous, PRN     Objective:     Vital Signs (Most Recent):  Temp: 97.9 °F (36.6 °C) (05/22/24 1108)  Pulse: (!) 118 (05/22/24  "1108)  Resp: 20 (05/22/24 1108)  BP: 112/77 (05/22/24 1108)  SpO2: 100 % (05/22/24 1108) Vital Signs (24h Range):  Temp:  [97.6 °F (36.4 °C)-98.4 °F (36.9 °C)] 97.9 °F (36.6 °C)  Pulse:  [] 118  Resp:  [14-20] 20  SpO2:  [95 %-100 %] 100 %  BP: ()/(0-77) 112/77       Intake/Output Summary (Last 24 hours) at 5/22/2024 1137  Last data filed at 5/22/2024 0759  Gross per 24 hour   Intake 1012 ml   Output 5850 ml   Net -4838 ml       Physical Exam    Significant Labs:  ABGs: No results for input(s): "PH", "PCO2", "PO2", "HCO3", "POCSATURATED", "BE" in the last 48 hours.  Amylase: No results for input(s): "AMYLASE" in the last 48 hours.  BMP:   Recent Labs   Lab 05/22/24 0424   *      K 3.4*   CL 99   CO2 30*   BUN 12   CREATININE 0.9   CALCIUM 8.9   MG 1.9     Cardiac markers:   Recent Labs   Lab 05/20/24  1550   TROPONINI 0.018     CBC:   Recent Labs   Lab 05/22/24 0424   WBC 6.87   RBC 4.12*   HGB 7.1*   HCT 25.7*      MCV 62*   MCH 17.2*   MCHC 27.6*     CMP:   Recent Labs   Lab 05/22/24 0424   *   CALCIUM 8.9   ALBUMIN 2.8*   PROT 8.4      K 3.4*   CO2 30*   CL 99   BUN 12   CREATININE 0.9   ALKPHOS 174*   ALT 8*   AST 28   BILITOT 1.7*     Coagulation:   Recent Labs   Lab 05/22/24 0424   INR 2.2*   APTT 33.3*     Lactic Acid: No results for input(s): "LACTATE" in the last 48 hours.  LFTs:   Recent Labs   Lab 05/22/24 0424   ALT 8*   AST 28   ALKPHOS 174*   BILITOT 1.7*   PROT 8.4   ALBUMIN 2.8*     Lipase: No results for input(s): "LIPASE" in the last 48 hours.    Significant Diagnostics:  I have reviewed all pertinent imaging results/findings within the past 24 hours.    Procedure: Device Interrogation Including analysis of device parameters  Current Settings: Ventricular Assist Device  Review of device function is stable      5/22/2024     7:55 AM 5/22/2024     4:14 AM 5/21/2024    11:10 PM 5/21/2024     8:00 PM 5/21/2024     4:10 PM 5/21/2024    12:27 PM 5/21/2024 "     8:34 AM   TXP LVAD INTERROGATIONS   Type HeartMate3   HeartMate3 HeartMate3 HeartMate3 HeartMate3   Flow 4.5 4.1 4.3 4.7 4.2 4.3 4.4   Speed 5150 5100 5150 5100 5100 5100 5100   PI 4.6 5.8 4.9 3.5 5.3 4.8 4.5   Power (Serna) 3.5 3.6 3.7 3.6 3.6 3.6 3.6   LSL 4700   4700 4700  4700   Pulsatility    No Pulse No Pulse No Pulse No Pulse       Assessment/Plan:     LVAD (left ventricular assist device) present  - Interval History was obtained from team member  during rounding today.  - VAD/ANABELLE teaching was performed with patient.  - Mobilization/Physical Therapy is ongoing.  - Anticoagulation held INR 2.2  - Admitted with volume overload and malfunctioning PICC   - PICC replaced with some bleeding from site   - WBC 6.8  - H/H 7.1/25  - BUN/Creatinine 12/0.9  -   - Net negative 1.9L   - Remains on milrinone     More than 50 percent of the care dominated counseling and coordinating care with different team members. The VAD was interrogated and any significant findings noted above.         Rajani Jackson PA-C  Cardiothoracic Surgery  [facility]

## 2024-05-22 NOTE — PT/OT/SLP PROGRESS
Occupational Therapy Screen    Patient Name: Kevan Queen  MRN: [id number]    OT orders acknowledged and received. Patient admitted for heart failure and has a history of LVAD implantation on 6/2022. Patient reports functioning at his baseline in ADLs and functional mobility as well as independently managing LVAD equipment. He denies any falls and/or dizziness, however endorses SOB that is at his baseline. OT educated patient on the importance of OOB mobility and continuing to perform ADLs independently to prevent a decline from his functional status. Patient acknowledged and verbalized understanding. OT to discharge orders. Please re-consult if patient's functional status changes warranting acute skilled therapy intervention.     5/22/24

## 2024-05-22 NOTE — PROGRESS NOTES
[facility]  Heart Transplant  Progress Note    Patient Name: Kevan Queen  MRN: [id number]  Admission Date: 5/20/2024  Hospital Length of Stay: 2 days  Attending Physician: Luca Lopez Jr.*  Primary Care Provider: Rosio Armendariz FNP  Principal Problem:Acute on chronic combined systolic and diastolic heart failure    Subjective:   Interval History: NAEON. No acute complaints. CVP 20 this AM.     Continuous Infusions:   milrinone 20mg/100ml D5W (200mcg/ml)  0.25 mcg/kg/min Intravenous Continuous 7.1 mL/hr at 05/22/24 0814 0.25 mcg/kg/min at 05/22/24 0814     Scheduled Meds:   amLODIPine  5 mg Oral Daily    aspirin  81 mg Oral Daily    atorvastatin  40 mg Oral Daily    cefadroxil  500 mg Oral Q12H    furosemide (LASIX) injection  80 mg Intravenous Q8H    insulin aspart U-100  13 Units Subcutaneous TIDWM    insulin glargine U-100  20 Units Subcutaneous BID    levETIRAcetam  1,500 mg Oral BID    magnesium oxide  400 mg Oral Daily    mirtazapine  15 mg Oral Nightly    pantoprazole  40 mg Oral Daily    sodium chloride 0.9%  10 mL Intravenous Q6H    spironolactone  25 mg Oral Daily     PRN Meds:  Current Facility-Administered Medications:     acetaminophen, 650 mg, Oral, Q6H PRN    cyclobenzaprine, 5 mg, Oral, TID PRN    dextrose 10%, 12.5 g, Intravenous, PRN    dextrose 10%, 25 g, Intravenous, PRN    glucagon (human recombinant), 1 mg, Intramuscular, PRN    glucose, 16 g, Oral, PRN    glucose, 24 g, Oral, PRN    insulin aspart U-100, 0-10 Units, Subcutaneous, QID (AC + HS) PRN    Flushing PICC/Midline Protocol, , , Until Discontinued **AND** sodium chloride 0.9%, 10 mL, Intravenous, Q6H **AND** sodium chloride 0.9%, 10 mL, Intravenous, PRN    Review of patient's allergies indicates:   Allergen Reactions    Bumex [bumetanide] Hives    Torsemide Hives     Objective:     Vital Signs (Most Recent):  Temp: 97.9 °F (36.6 °C) (05/22/24 1108)  Pulse: (!) 118 (05/22/24 1108)  Resp: 20  (05/22/24 1108)  BP: 112/77 (05/22/24 1108)  SpO2: 100 % (05/22/24 1108) Vital Signs (24h Range):  Temp:  [97.6 °F (36.4 °C)-98.4 °F (36.9 °C)] 97.9 °F (36.6 °C)  Pulse:  [] 118  Resp:  [14-20] 20  SpO2:  [95 %-100 %] 100 %  BP: ()/(0-77) 112/77     Patient Vitals for the past 72 hrs (Last 3 readings):   Weight   05/21/24 0748 84.5 kg (186 lb 4.6 oz)   05/21/24 0437 84.6 kg (186 lb 8.2 oz)   05/20/24 1513 81.6 kg (180 lb)     Body mass index is 23.28 kg/m².      Intake/Output Summary (Last 24 hours) at 5/22/2024 1156  Last data filed at 5/22/2024 0759  Gross per 24 hour   Intake 1012 ml   Output 5850 ml   Net -4838 ml       Hemodynamic Parameters:            Physical Exam  Constitutional:       General: He is not in acute distress.     Appearance: Normal appearance.   HENT:      Head: Normocephalic and atraumatic.   Eyes:      Conjunctiva/sclera: Conjunctivae normal.   Neck:      Vascular: JVD present.      Comments: JVP elevated  Cardiovascular:      Comments: VAD hum appreciated  Pulmonary:      Breath sounds: Normal breath sounds.      Comments: Clear to auscultation  Abdominal:      Comments: Soft, non-tender, non-distended   Musculoskeletal:      Cervical back: Normal range of motion.      Comments: Bilateral LE pitting edema.    Skin:     General: Skin is warm and dry.      Capillary Refill: Capillary refill takes less than 2 seconds.   Neurological:      General: No focal deficit present.      Mental Status: He is alert and oriented to person, place, and time.   Psychiatric:         Mood and Affect: Mood and affect normal.            Significant Labs:  CBC:  Recent Labs   Lab 05/20/24  1550 05/20/24  1555 05/21/24  0422 05/22/24  0424   WBC 6.16  --  6.27 6.87   RBC 4.40*  --  3.98* 4.12*   HGB 7.5*  --  6.9* 7.1*   HCT 28.2* 30* 25.0* 25.7*     --  256 268   MCV 64*  --  63* 62*   MCH 17.0*  --  17.3* 17.2*   MCHC 26.6*  --  27.6* 27.6*     BNP:  Recent Labs   Lab 05/20/24  2874  05/20/24  1550 05/22/24  0424   BNP 1,148* 1,168* 777*     CMP:  Recent Labs   Lab 05/20/24  1550 05/21/24  0422 05/21/24  1423 05/22/24  0424   GLU 63* 268*  --  227*   CALCIUM 8.8 8.6*  --  8.9   ALBUMIN 3.0* 2.8*  --  2.8*   PROT 8.5* 8.2  --  8.4    135*  --  137   K 3.3* 3.4* 4.1 3.4*   CO2 25 23  --  30*    101  --  99   BUN 12 13  --  12   CREATININE 1.0 1.0  --  0.9   ALKPHOS 170* 163*  --  174*   ALT 11 10  --  8*   AST 23 24  --  28   BILITOT 1.9* 1.8*  --  1.7*      Coagulation:   Recent Labs   Lab 05/20/24  1550 05/21/24  0422 05/22/24  0424   INR 5.4* 4.1* 2.2*   APTT  --  37.4* 33.3*     LDH:  Recent Labs   Lab 05/20/24  1422 05/20/24  1550 05/21/24  0422 05/22/24  0424   * 295* 281* 283*     Microbiology:  Microbiology Results (last 7 days)       Procedure Component Value Units Date/Time    Culture, Anaerobe [[id number]] Collected: 05/20/24 1653    Order Status: Completed Specimen: Wound from Abdomen Updated: 05/22/24 0946     Anaerobic Culture Culture in progress    Narrative:      LEXX ALVAREZ    Aerobic culture [[id number]] Collected: 05/20/24 1653    Order Status: Completed Specimen: Wound from Abdomen Updated: 05/21/24 0802     Aerobic Bacterial Culture No growth    Narrative:      LEXX ALVAREZ    Gram stain [[id number]] Collected: 05/20/24 1653    Order Status: Completed Specimen: Wound from Abdomen Updated: 05/20/24 1957     Gram Stain Result No WBC's      No organisms seen    Narrative:      LEXX ALVAREZ            I have reviewed all pertinent labs within the past 24 hours.    Estimated Creatinine Clearance: 131.7 mL/min (based on SCr of 0.9 mg/dL).    Diagnostic Results:  I have reviewed all pertinent imaging results/findings within the past 24 hours.  Assessment and Plan:     Mr. Kevan Thacker is a 39 year old male with stage D 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 Familial CM-Father had LVAD and subsequent heart transplant), polysubstance abuse, DM underwent DT-HM3 implantation 6/23/2022 with early  RV failure requiring RVAD with Luizk Duo after admitted with ADHF/cardiogenic shock on home milrinone requiring IABP. He underwent RVAD removal and chest closure 6/30/2022.  He also completed a course of IV Abx for staph epi. He was weaned off  but he had to restarted due to RVF. He was eventually transitioned to milrinone (secondary to  shortage) now on 0.25 mcg/kg/min. He has a history of unclear syncope vs seizures and is followed by neuro for this and is on Keppra.       On 11/15/23 underwent removal of eroded [employer] scICD--no Lifevest (due to LVAD) and no plan to replace ICD as not requiring therapy post LVAD with concern for reinfection.  He has not had neurology f/u with seizure hx on keppra so coordinator to assist him with obtaining appt.    Patient presented today for routine HTS clinic appointment but was found to hunched over in a chair w/ marked dyspnea. Patient sent directly to the ED for further evaluation. States Picc line stopped working last night. Patient currently endorses chronic SOB w.exertion, but denies any SOB at rest, orthopnea, chest pain, fever, chills, nausea, vomiting, abdominal pain, abd distention, PND. States instead of take Lasix 80mg daily, he was taking Lasix 40mg bid.     Of note, patient was most recently hospitalized from 4/20-4/26 for ADHF and hyperglycemia. patient noted to be hyperglycemia w/ glucose>500 and was started on insulin drip and then switched to basal bolus dosing as per Endocrinology once hyperglycemia resolved. Patient was also started on IV lasix for ADHF. Patient throughout the hospital course had difficulty taking lasix due to extreme cramping not related to electrolyte de-arrangement. Discussion was had with patient on whether he would rather switch to hospice care patient stated he would rather live with the discomfort associated w/ lasix. At the time of discharge patient was maintaining euvolemia w/ lasix PO 80mg daily.    Home  Medications:   amlodipine besylate 5 mg Oral Daily  aspirin 81 mg Oral Daily  atorvastatin calcium 40 mg Oral Daily  cefadroxil 500 mg Oral Every 12 hours  cyclobenzaprine HCl 5 mg Oral 3 times daily PRN  furosemide 80 mg Oral Daily  Insulin detemir 20 units bid  Insulin aspart 18units tid  Keppra 1500mg bid  magnesium oxide 400 mg Oral Daily  milrinone lactate,milrinone lactate/D5W 0.25 mcg/kg/min (23.6 mcg/min) Intravenous Continuous  mirtazapine 15 mg Oral Nightly  pantoprazole sodium 40 mg Oral Daily  potassium chloride 10 MEQ 30 mEq Oral Daily  spironolactone 25 mg Oral Daily  warfarin sodium 3 MG Take 1.5 tablets (4.5mg) orally daily, except 1 tablet (3mg) on Sundays and Fridays        Cardiac Imaging:   Echo (9/2/2023): HM3 5100. AV does not open. IV septum midline. LV sevewrely dilated w/ normal ventricular mass, normal wall thickness, normal wall motion, severely reduced systolic function w/ EF 10-15%, and normal diastolic function. RV normal cavity size w/ normal wall thickness, normal wall motion, and normal systolic function. Mild MR. Mod to severe TR. PA sys pressure 38mmHg. LVIDd 7.11cm.     RHC (10/31/2022): RA 18, PWP 21, CO 3.55, CI 1.6. On mil 0.125 and HM3 5100.     * Acute on chronic combined systolic and diastolic heart failure  -NICM s/p HM 3 on home Milrinone   -Last 2D Echo  9/5/23 : LVEF 10-15%, LVEDD  7.11 cm with speed at 5100  -Last RHC: 10/31/22 with speed at 5100 on Milrinone 0.125 RA: 18, RV: 35/7 (22), PAP: 35/19 (24), PWP: 21, CO: 3.55, CI: 1.6  -Hypervolemic on examination today. Continue IV lasix 80mg tid. Cyclobenzaprine to be given w/ lasix to prevent cramping (historicaly not related to electrolytes)  -Continue home milrinone 0.25  -GDMT with N/A  -2g Na dietary restriction, 1500 mL fluid restriction, strict I/Os    LVAD (left ventricular assist device) present  Procedure: Device Interrogation Including analysis of device parameters  Current Settings: Ventricular Assist  Device  Review of device function is stable/unstable stable  Anticoagulation w/ coumadin. Goal INR 2-3. S/p 1mg Vit K on 5/21. Currenly therapeutic.        5/22/2024     7:55 AM 5/22/2024     4:14 AM 5/21/2024    11:10 PM 5/21/2024     8:00 PM 5/21/2024     4:10 PM 5/21/2024    12:27 PM 5/21/2024     8:34 AM   TXP LVAD INTERROGATIONS   Type HeartMate3   HeartMate3 HeartMate3 HeartMate3 HeartMate3   Flow 4.5 4.1 4.3 4.7 4.2 4.3 4.4   Speed 5150 5100 5150 5100 5100 5100 5100   PI 4.6 5.8 4.9 3.5 5.3 4.8 4.5   Power (Serna) 3.5 3.6 3.7 3.6 3.6 3.6 3.6   LSL 4700   4700 4700  4700   Pulsatility    No Pulse No Pulse No Pulse No Pulse         Seizure-like activity  Continue home keppra.     Type 2 diabetes mellitus with hyperglycemia, with long-term current use of insulin  Management as per Endocrinology.     HTN (hypertension)  - continue amlodopine.         Chantal Herndon MD  Heart Transplant  [facility]

## 2024-05-22 NOTE — ASSESSMENT & PLAN NOTE
Procedure: Device Interrogation Including analysis of device parameters  Current Settings: Ventricular Assist Device  Review of device function is stable/unstable stable  Anticoagulation w/ coumadin. Goal INR 2-3. S/p 1mg Vit K on 5/21. Currenly therapeutic.        5/22/2024     7:55 AM 5/22/2024     4:14 AM 5/21/2024    11:10 PM 5/21/2024     8:00 PM 5/21/2024     4:10 PM 5/21/2024    12:27 PM 5/21/2024     8:34 AM   TXP LVAD INTERROGATIONS   Type HeartMate3   HeartMate3 HeartMate3 HeartMate3 HeartMate3   Flow 4.5 4.1 4.3 4.7 4.2 4.3 4.4   Speed 5150 5100 5150 5100 5100 5100 5100   PI 4.6 5.8 4.9 3.5 5.3 4.8 4.5   Power (Serna) 3.5 3.6 3.7 3.6 3.6 3.6 3.6   LSL 4700   4700 4700  4700   Pulsatility    No Pulse No Pulse No Pulse No Pulse

## 2024-05-22 NOTE — ASSESSMENT & PLAN NOTE
Endocrinology consulted for BG management.   BG goal 140-180    - Lantus (Insulin Glargine) 20 units BID (20% increase due to fasting blood glucose above goal)  - Novolog (Insulin Aspart) 17 units TIDWM and prn for BG excursions MDC SSI (150/25) (20% increase due to prandial blood glucose  above goal)  - BG checks AC/HS  - Hypoglycemia protocol in place      ** Please notify Endocrine for any change and/or advance in diet**  ** Please call Endocrine for any BG related issues **    Discharge Planning:   TBD. Please notify endocrinology prior to discharge.

## 2024-05-22 NOTE — PROGRESS NOTES
Attempted to meet with pt yesterday and today for admit assessment. At both attempts pt was asleep in bed. No caregivers present. SW will make further attempts. Providing ongoing psychosocial and counseling support, education, resources, assistance and discharge planning as indicated. Following and available.

## 2024-05-22 NOTE — SUBJECTIVE & OBJECTIVE
Interval History: JORGE AEON. Continues on milrinone.     Implant 6/29/22    Medications:  Continuous Infusions:   milrinone 20mg/100ml D5W (200mcg/ml)  0.25 mcg/kg/min Intravenous Continuous 7.1 mL/hr at 05/22/24 0814 0.25 mcg/kg/min at 05/22/24 0814     Scheduled Meds:   amLODIPine  5 mg Oral Daily    aspirin  81 mg Oral Daily    atorvastatin  40 mg Oral Daily    cefadroxil  500 mg Oral Q12H    furosemide (LASIX) injection  80 mg Intravenous Q8H    insulin aspart U-100  17 Units Subcutaneous TIDWM    insulin glargine U-100  20 Units Subcutaneous BID    levETIRAcetam  1,500 mg Oral BID    magnesium oxide  400 mg Oral Daily    mirtazapine  15 mg Oral Nightly    pantoprazole  40 mg Oral Daily    sodium chloride 0.9%  10 mL Intravenous Q6H    spironolactone  25 mg Oral Daily     PRN Meds:  Current Facility-Administered Medications:     acetaminophen, 650 mg, Oral, Q6H PRN    cyclobenzaprine, 5 mg, Oral, TID PRN    dextrose 10%, 12.5 g, Intravenous, PRN    dextrose 10%, 25 g, Intravenous, PRN    glucagon (human recombinant), 1 mg, Intramuscular, PRN    glucose, 16 g, Oral, PRN    glucose, 24 g, Oral, PRN    insulin aspart U-100, 0-10 Units, Subcutaneous, QID (AC + HS) PRN    Flushing PICC/Midline Protocol, , , Until Discontinued **AND** sodium chloride 0.9%, 10 mL, Intravenous, Q6H **AND** sodium chloride 0.9%, 10 mL, Intravenous, PRN     Objective:     Vital Signs (Most Recent):  Temp: 97.9 °F (36.6 °C) (05/22/24 1108)  Pulse: (!) 118 (05/22/24 1108)  Resp: 20 (05/22/24 1108)  BP: 112/77 (05/22/24 1108)  SpO2: 100 % (05/22/24 1108) Vital Signs (24h Range):  Temp:  [97.6 °F (36.4 °C)-98.4 °F (36.9 °C)] 97.9 °F (36.6 °C)  Pulse:  [] 118  Resp:  [14-20] 20  SpO2:  [95 %-100 %] 100 %  BP: ()/(0-77) 112/77       Intake/Output Summary (Last 24 hours) at 5/22/2024 1137  Last data filed at 5/22/2024 0759  Gross per 24 hour   Intake 1012 ml   Output 5850 ml   Net -4838 ml       Physical Exam    Significant Labs:  ABGs:  "No results for input(s): "PH", "PCO2", "PO2", "HCO3", "POCSATURATED", "BE" in the last 48 hours.  Amylase: No results for input(s): "AMYLASE" in the last 48 hours.  BMP:   Recent Labs   Lab 05/22/24 0424   *      K 3.4*   CL 99   CO2 30*   BUN 12   CREATININE 0.9   CALCIUM 8.9   MG 1.9     Cardiac markers:   Recent Labs   Lab 05/20/24  1550   TROPONINI 0.018     CBC:   Recent Labs   Lab 05/22/24 0424   WBC 6.87   RBC 4.12*   HGB 7.1*   HCT 25.7*      MCV 62*   MCH 17.2*   MCHC 27.6*     CMP:   Recent Labs   Lab 05/22/24 0424   *   CALCIUM 8.9   ALBUMIN 2.8*   PROT 8.4      K 3.4*   CO2 30*   CL 99   BUN 12   CREATININE 0.9   ALKPHOS 174*   ALT 8*   AST 28   BILITOT 1.7*     Coagulation:   Recent Labs   Lab 05/22/24 0424   INR 2.2*   APTT 33.3*     Lactic Acid: No results for input(s): "LACTATE" in the last 48 hours.  LFTs:   Recent Labs   Lab 05/22/24 0424   ALT 8*   AST 28   ALKPHOS 174*   BILITOT 1.7*   PROT 8.4   ALBUMIN 2.8*     Lipase: No results for input(s): "LIPASE" in the last 48 hours.    Significant Diagnostics:  I have reviewed all pertinent imaging results/findings within the past 24 hours.    Procedure: Device Interrogation Including analysis of device parameters  Current Settings: Ventricular Assist Device  Review of device function is stable      5/22/2024     7:55 AM 5/22/2024     4:14 AM 5/21/2024    11:10 PM 5/21/2024     8:00 PM 5/21/2024     4:10 PM 5/21/2024    12:27 PM 5/21/2024     8:34 AM   TXP LVAD INTERROGATIONS   Type HeartMate3   HeartMate3 HeartMate3 HeartMate3 HeartMate3   Flow 4.5 4.1 4.3 4.7 4.2 4.3 4.4   Speed 5150 5100 5150 5100 5100 5100 5100   PI 4.6 5.8 4.9 3.5 5.3 4.8 4.5   Power (Serna) 3.5 3.6 3.7 3.6 3.6 3.6 3.6   LSL 4700   4700 4700  4700   Pulsatility    No Pulse No Pulse No Pulse No Pulse       "

## 2024-05-23 LAB
ALBUMIN SERPL BCP-MCNC: 3 G/DL (ref 3.5–5.2)
ALP SERPL-CCNC: 194 U/L (ref 55–135)
ALT SERPL W/O P-5'-P-CCNC: 13 U/L (ref 10–44)
ANION GAP SERPL CALC-SCNC: 8 MMOL/L (ref 8–16)
APTT PPP: 31 SEC (ref 21–32)
APTT PPP: 37.3 SEC (ref 21–32)
APTT PPP: 37.6 SEC (ref 21–32)
APTT PPP: 41.8 SEC (ref 21–32)
AST SERPL-CCNC: 29 U/L (ref 10–40)
BACTERIA SPEC AEROBE CULT: NO GROWTH
BASOPHILS # BLD AUTO: 0.06 K/UL (ref 0–0.2)
BASOPHILS NFR BLD: 0.9 % (ref 0–1.9)
BILIRUB SERPL-MCNC: 1.8 MG/DL (ref 0.1–1)
BUN SERPL-MCNC: 15 MG/DL (ref 6–20)
CALCIUM SERPL-MCNC: 9.4 MG/DL (ref 8.7–10.5)
CHLORIDE SERPL-SCNC: 96 MMOL/L (ref 95–110)
CO2 SERPL-SCNC: 29 MMOL/L (ref 23–29)
CREAT SERPL-MCNC: 1.1 MG/DL (ref 0.5–1.4)
DIFFERENTIAL METHOD BLD: ABNORMAL
EOSINOPHIL # BLD AUTO: 0.1 K/UL (ref 0–0.5)
EOSINOPHIL NFR BLD: 1.9 % (ref 0–8)
ERYTHROCYTE [DISTWIDTH] IN BLOOD BY AUTOMATED COUNT: 25 % (ref 11.5–14.5)
EST. GFR  (NO RACE VARIABLE): >60 ML/MIN/1.73 M^2
GLUCOSE SERPL-MCNC: 205 MG/DL (ref 70–110)
HCT VFR BLD AUTO: 29.1 % (ref 40–54)
HGB BLD-MCNC: 7.8 G/DL (ref 14–18)
IMM GRANULOCYTES # BLD AUTO: 0.02 K/UL (ref 0–0.04)
IMM GRANULOCYTES NFR BLD AUTO: 0.3 % (ref 0–0.5)
INR PPP: 1.5 (ref 0.8–1.2)
LDH SERPL L TO P-CCNC: 295 U/L (ref 110–260)
LYMPHOCYTES # BLD AUTO: 1.4 K/UL (ref 1–4.8)
LYMPHOCYTES NFR BLD: 21.4 % (ref 18–48)
MAGNESIUM SERPL-MCNC: 1.9 MG/DL (ref 1.6–2.6)
MAGNESIUM SERPL-MCNC: 2.1 MG/DL (ref 1.6–2.6)
MCH RBC QN AUTO: 16.8 PG (ref 27–31)
MCHC RBC AUTO-ENTMCNC: 26.8 G/DL (ref 32–36)
MCV RBC AUTO: 63 FL (ref 82–98)
MONOCYTES # BLD AUTO: 0.6 K/UL (ref 0.3–1)
MONOCYTES NFR BLD: 9.1 % (ref 4–15)
NEUTROPHILS # BLD AUTO: 4.5 K/UL (ref 1.8–7.7)
NEUTROPHILS NFR BLD: 66.4 % (ref 38–73)
NRBC BLD-RTO: 0 /100 WBC
PLATELET # BLD AUTO: 297 K/UL (ref 150–450)
PMV BLD AUTO: ABNORMAL FL (ref 9.2–12.9)
POCT GLUCOSE: 135 MG/DL (ref 70–110)
POCT GLUCOSE: 137 MG/DL (ref 70–110)
POCT GLUCOSE: 257 MG/DL (ref 70–110)
POCT GLUCOSE: 287 MG/DL (ref 70–110)
POTASSIUM SERPL-SCNC: 3.8 MMOL/L (ref 3.5–5.1)
POTASSIUM SERPL-SCNC: 4 MMOL/L (ref 3.5–5.1)
PROT SERPL-MCNC: 9.1 G/DL (ref 6–8.4)
PROTHROMBIN TIME: 15.8 SEC (ref 9–12.5)
RBC # BLD AUTO: 4.63 M/UL (ref 4.6–6.2)
SODIUM SERPL-SCNC: 133 MMOL/L (ref 136–145)
WBC # BLD AUTO: 6.73 K/UL (ref 3.9–12.7)

## 2024-05-23 PROCEDURE — 85025 COMPLETE CBC W/AUTO DIFF WBC: CPT | Performed by: STUDENT IN AN ORGANIZED HEALTH CARE EDUCATION/TRAINING PROGRAM

## 2024-05-23 PROCEDURE — 85730 THROMBOPLASTIN TIME PARTIAL: CPT | Mod: 91 | Performed by: INTERNAL MEDICINE

## 2024-05-23 PROCEDURE — 63600175 PHARM REV CODE 636 W HCPCS: Performed by: STUDENT IN AN ORGANIZED HEALTH CARE EDUCATION/TRAINING PROGRAM

## 2024-05-23 PROCEDURE — 83615 LACTATE (LD) (LDH) ENZYME: CPT | Performed by: STUDENT IN AN ORGANIZED HEALTH CARE EDUCATION/TRAINING PROGRAM

## 2024-05-23 PROCEDURE — 85610 PROTHROMBIN TIME: CPT | Performed by: STUDENT IN AN ORGANIZED HEALTH CARE EDUCATION/TRAINING PROGRAM

## 2024-05-23 PROCEDURE — 80053 COMPREHEN METABOLIC PANEL: CPT | Performed by: STUDENT IN AN ORGANIZED HEALTH CARE EDUCATION/TRAINING PROGRAM

## 2024-05-23 PROCEDURE — 84132 ASSAY OF SERUM POTASSIUM: CPT | Performed by: STUDENT IN AN ORGANIZED HEALTH CARE EDUCATION/TRAINING PROGRAM

## 2024-05-23 PROCEDURE — 94761 N-INVAS EAR/PLS OXIMETRY MLT: CPT

## 2024-05-23 PROCEDURE — 83735 ASSAY OF MAGNESIUM: CPT | Mod: 91 | Performed by: STUDENT IN AN ORGANIZED HEALTH CARE EDUCATION/TRAINING PROGRAM

## 2024-05-23 PROCEDURE — A4216 STERILE WATER/SALINE, 10 ML: HCPCS | Performed by: INTERNAL MEDICINE

## 2024-05-23 PROCEDURE — 25000003 PHARM REV CODE 250: Performed by: INTERNAL MEDICINE

## 2024-05-23 PROCEDURE — 85730 THROMBOPLASTIN TIME PARTIAL: CPT | Performed by: STUDENT IN AN ORGANIZED HEALTH CARE EDUCATION/TRAINING PROGRAM

## 2024-05-23 PROCEDURE — 27000248 HC VAD-ADDITIONAL DAY

## 2024-05-23 PROCEDURE — 99233 SBSQ HOSP IP/OBS HIGH 50: CPT | Mod: ,,, | Performed by: INTERNAL MEDICINE

## 2024-05-23 PROCEDURE — 20600001 HC STEP DOWN PRIVATE ROOM

## 2024-05-23 PROCEDURE — 93750 INTERROGATION VAD IN PERSON: CPT | Mod: ,,, | Performed by: INTERNAL MEDICINE

## 2024-05-23 PROCEDURE — 25000003 PHARM REV CODE 250: Performed by: STUDENT IN AN ORGANIZED HEALTH CARE EDUCATION/TRAINING PROGRAM

## 2024-05-23 RX ORDER — WARFARIN 3 MG/1
6 TABLET ORAL DAILY
Status: DISCONTINUED | OUTPATIENT
Start: 2024-05-23 | End: 2024-05-26

## 2024-05-23 RX ORDER — HEPARIN SODIUM,PORCINE/D5W 25000/250
0-40 INTRAVENOUS SOLUTION INTRAVENOUS CONTINUOUS
Status: DISCONTINUED | OUTPATIENT
Start: 2024-05-23 | End: 2024-05-26

## 2024-05-23 RX ORDER — WARFARIN SODIUM 5 MG/1
5 TABLET ORAL DAILY
Status: DISCONTINUED | OUTPATIENT
Start: 2024-05-23 | End: 2024-05-23

## 2024-05-23 RX ORDER — INSULIN GLARGINE 100 [IU]/ML
20 INJECTION, SOLUTION SUBCUTANEOUS 2 TIMES DAILY
Status: DISCONTINUED | OUTPATIENT
Start: 2024-05-23 | End: 2024-05-27 | Stop reason: HOSPADM

## 2024-05-23 RX ORDER — INSULIN ASPART 100 [IU]/ML
15 INJECTION, SOLUTION INTRAVENOUS; SUBCUTANEOUS
Status: DISCONTINUED | OUTPATIENT
Start: 2024-05-23 | End: 2024-05-24

## 2024-05-23 RX ORDER — POTASSIUM CHLORIDE 20 MEQ/1
40 TABLET, EXTENDED RELEASE ORAL ONCE
Status: COMPLETED | OUTPATIENT
Start: 2024-05-23 | End: 2024-05-23

## 2024-05-23 RX ADMIN — Medication 10 ML: at 06:05

## 2024-05-23 RX ADMIN — Medication 400 MG: at 08:05

## 2024-05-23 RX ADMIN — MILRINONE LACTATE IN DEXTROSE 0.25 MCG/KG/MIN: 200 INJECTION, SOLUTION INTRAVENOUS at 01:05

## 2024-05-23 RX ADMIN — INSULIN GLARGINE 20 UNITS: 100 INJECTION, SOLUTION SUBCUTANEOUS at 09:05

## 2024-05-23 RX ADMIN — FUROSEMIDE 80 MG: 10 INJECTION, SOLUTION INTRAVENOUS at 05:05

## 2024-05-23 RX ADMIN — Medication 10 ML: at 05:05

## 2024-05-23 RX ADMIN — PANTOPRAZOLE SODIUM 40 MG: 40 TABLET, DELAYED RELEASE ORAL at 08:05

## 2024-05-23 RX ADMIN — HEPARIN SODIUM 12 UNITS/KG/HR: 10000 INJECTION, SOLUTION INTRAVENOUS at 07:05

## 2024-05-23 RX ADMIN — LEVETIRACETAM 1500 MG: 500 TABLET, FILM COATED ORAL at 09:05

## 2024-05-23 RX ADMIN — SPIRONOLACTONE 25 MG: 25 TABLET ORAL at 08:05

## 2024-05-23 RX ADMIN — ATORVASTATIN CALCIUM 40 MG: 20 TABLET, FILM COATED ORAL at 08:05

## 2024-05-23 RX ADMIN — MIRTAZAPINE 15 MG: 15 TABLET, FILM COATED ORAL at 09:05

## 2024-05-23 RX ADMIN — POTASSIUM CHLORIDE 40 MEQ: 1500 TABLET, EXTENDED RELEASE ORAL at 08:05

## 2024-05-23 RX ADMIN — Medication 10 ML: at 01:05

## 2024-05-23 RX ADMIN — INSULIN ASPART 6 UNITS: 100 INJECTION, SOLUTION INTRAVENOUS; SUBCUTANEOUS at 08:05

## 2024-05-23 RX ADMIN — CEFADROXIL 500 MG: 500 CAPSULE ORAL at 08:05

## 2024-05-23 RX ADMIN — FUROSEMIDE 80 MG: 10 INJECTION, SOLUTION INTRAVENOUS at 01:05

## 2024-05-23 RX ADMIN — CEFADROXIL 500 MG: 500 CAPSULE ORAL at 09:05

## 2024-05-23 RX ADMIN — ASPIRIN 81 MG: 81 TABLET, COATED ORAL at 08:05

## 2024-05-23 RX ADMIN — INSULIN ASPART 12 UNITS: 100 INJECTION, SOLUTION INTRAVENOUS; SUBCUTANEOUS at 01:05

## 2024-05-23 RX ADMIN — INSULIN ASPART 12 UNITS: 100 INJECTION, SOLUTION INTRAVENOUS; SUBCUTANEOUS at 08:05

## 2024-05-23 RX ADMIN — FUROSEMIDE 80 MG: 10 INJECTION, SOLUTION INTRAVENOUS at 09:05

## 2024-05-23 RX ADMIN — LEVETIRACETAM 1500 MG: 500 TABLET, FILM COATED ORAL at 08:05

## 2024-05-23 RX ADMIN — INSULIN GLARGINE 20 UNITS: 100 INJECTION, SOLUTION SUBCUTANEOUS at 08:05

## 2024-05-23 RX ADMIN — AMLODIPINE BESYLATE 5 MG: 5 TABLET ORAL at 08:05

## 2024-05-23 RX ADMIN — INSULIN ASPART 15 UNITS: 100 INJECTION, SOLUTION INTRAVENOUS; SUBCUTANEOUS at 06:05

## 2024-05-23 RX ADMIN — WARFARIN SODIUM 6 MG: 3 TABLET ORAL at 04:05

## 2024-05-23 RX ADMIN — INSULIN ASPART 6 UNITS: 100 INJECTION, SOLUTION INTRAVENOUS; SUBCUTANEOUS at 01:05

## 2024-05-23 NOTE — PROGRESS NOTES
Discharge planning    Per MDR, pt to discharge Friday. TREY informed [facility] and faxed orders to [facility] [phone], fax [phone].  Providing ongoing psychosocial and counseling support, education, resources, assistance and discharge planning as indicated. Following and available.

## 2024-05-23 NOTE — PROGRESS NOTES
05/23/2024  Enrique NANDO Awan Jr    Current provider:  Luca Lopez Jr.*    Device interrogation:      5/23/2024     8:08 AM 5/23/2024     3:35 AM 5/22/2024    11:51 PM 5/22/2024     9:06 PM 5/22/2024     4:47 PM 5/22/2024    12:21 PM 5/22/2024     7:55 AM   TXP LVAD INTERROGATIONS   Type HeartMate3    HeartMate3 HeartMate3 HeartMate3   Flow 4 3.9 4.3 4.3 4.1 4 4.5   Speed 5100 5100 5100 5100 5100 5100 5150   PI 6.7 6.3 5.2 5.1 6.5 6 4.6   Power (Serna) 3.7 3.5 3.6 3.6 3.7 3.6 3.5   LSL 4700  4700  4700 4700 4700   Pulsatility   No Pulse              Rounded on Kevan Queen to ensure all mechanical assist device settings (IABP or VAD) were appropriate and all parameters were within limits.  I was able to ensure all back up equipment was present, the staff had no issues, and the Perfusion Department daily rounding was complete.      For implantable VADs: Interrogation of Ventricular assist device was performed with analysis of device parameters and review of device function. I have personally reviewed the interrogation findings and agree with findings as stated.     In emergency, the nursing units have been notified to contact the perfusion department either by:  Calling [phone] from 630am to 4pm Mon thru Fri, utilizing the On-Call Finder functionality of Epic and searching for Perfusion, or by contacting the hospital  from 4pm to 630am and on weekends and asking to speak with the perfusionist on call.    9:23 AM

## 2024-05-23 NOTE — ASSESSMENT & PLAN NOTE
Procedure: Device Interrogation Including analysis of device parameters  Current Settings: Ventricular Assist Device  Review of device function is stable/unstable stable  Anticoagulation w/ coumadin. Goal INR 2-3. S/p 1mg Vit K on 5/21. Currenly subtherapeuic. Will start coumadin w/ heparin bridge.         5/23/2024     8:08 AM 5/23/2024     3:35 AM 5/22/2024    11:51 PM 5/22/2024     9:06 PM 5/22/2024     4:47 PM 5/22/2024    12:21 PM 5/22/2024     7:55 AM   TXP LVAD INTERROGATIONS   Type HeartMate3    HeartMate3 HeartMate3 HeartMate3   Flow 4 3.9 4.3 4.3 4.1 4 4.5   Speed 5100 5100 5100 5100 5100 5100 5150   PI 6.7 6.3 5.2 5.1 6.5 6 4.6   Power (Serna) 3.7 3.5 3.6 3.6 3.7 3.6 3.5   LSL 4700  4700  4700 4700 4700   Pulsatility   No Pulse

## 2024-05-23 NOTE — ASSESSMENT & PLAN NOTE
- Interval History was obtained from team member  during rounding today.  - VAD/ANABELLE teaching was performed with patient.  - Mobilization/Physical Therapy is ongoing.  - Anticoagulation ongoing INR 1.5. Started on heparin gtt   - Admitted with volume overload and malfunctioning PICC   - PICC replaced with some bleeding from site   - WBC 6.7  - H/H 7.8/29  - BUN/Creatinine 15/1.1  -   - Net negative 2L / made 3.7L    - Remains on milrinone     More than 50 percent of the care dominated counseling and coordinating care with different team members. The VAD was interrogated and any significant findings noted above.

## 2024-05-23 NOTE — PROGRESS NOTES
[facility]  Cardiothoracic Surgery  Evaluation and Management/VAD interrogation       Patient Name: Kevan Queen  MRN: [id number]  Admission Date: 5/20/2024  Hospital Length of Stay: 3 days  Code Status: Full Code   Attending Physician: Luca Lopez Jr.*   Referring Provider: Self, Aaareferral  Principal Problem:Acute on chronic combined systolic and diastolic heart failure      Subjective:     Post-Op Info:  * No surgery found *           Interval History: NAEON. Remains on milrinone. Started on heparin gtt. INR 1.5.       Medications:  Continuous Infusions:   heparin (porcine) in D5W  0-40 Units/kg/hr (Adjusted) Intravenous Continuous 9 mL/hr at 05/23/24 0752 12 Units/kg/hr at 05/23/24 0752    milrinone 20mg/100ml D5W (200mcg/ml)  0.25 mcg/kg/min Intravenous Continuous 7.1 mL/hr at 05/22/24 0814 0.25 mcg/kg/min at 05/22/24 0814     Scheduled Meds:   amLODIPine  5 mg Oral Daily    aspirin  81 mg Oral Daily    atorvastatin  40 mg Oral Daily    cefadroxil  500 mg Oral Q12H    furosemide (LASIX) injection  80 mg Intravenous Q8H    insulin aspart U-100  12 Units Subcutaneous TIDWM    insulin glargine U-100  20 Units Subcutaneous BID    levETIRAcetam  1,500 mg Oral BID    magnesium oxide  400 mg Oral Daily    mirtazapine  15 mg Oral Nightly    pantoprazole  40 mg Oral Daily    sodium chloride 0.9%  10 mL Intravenous Q6H    spironolactone  25 mg Oral Daily    warfarin  5 mg Oral Daily     PRN Meds:  Current Facility-Administered Medications:     acetaminophen, 650 mg, Oral, Q6H PRN    cyclobenzaprine, 5 mg, Oral, TID PRN    dextrose 10%, 12.5 g, Intravenous, PRN    dextrose 10%, 25 g, Intravenous, PRN    glucagon (human recombinant), 1 mg, Intramuscular, PRN    glucose, 16 g, Oral, PRN    glucose, 24 g, Oral, PRN    insulin aspart U-100, 0-10 Units, Subcutaneous, QID (AC + HS) PRN    Flushing PICC/Midline Protocol, , , Until Discontinued **AND** sodium chloride 0.9%, 10 mL,  "Intravenous, Q6H **AND** sodium chloride 0.9%, 10 mL, Intravenous, PRN     Objective:     Vital Signs (Most Recent):  Temp: 96.4 °F (35.8 °C) (05/23/24 0726)  Pulse: 110 (05/23/24 0924)  Resp: 18 (05/23/24 0726)  BP: (!) 88/0 (05/23/24 0807)  SpO2: 98 % (05/23/24 0924) Vital Signs (24h Range):  Temp:  [96.4 °F (35.8 °C)-98.1 °F (36.7 °C)] 96.4 °F (35.8 °C)  Pulse:  [101-119] 110  Resp:  [18-20] 18  SpO2:  [97 %-100 %] 98 %  BP: ()/(0-77) 88/0       Intake/Output Summary (Last 24 hours) at 5/23/2024 0957  Last data filed at 5/23/2024 0810  Gross per 24 hour   Intake 942 ml   Output 3950 ml   Net -3008 ml       Physical Exam  Vitals reviewed.   Constitutional:       Appearance: He is well-developed.   HENT:      Head: Normocephalic and atraumatic.   Cardiovascular:      Comments: VAD  Pulmonary:      Effort: Pulmonary effort is normal. No respiratory distress.   Skin:     Coloration: Skin is not pale.   Neurological:      General: No focal deficit present.      Mental Status: He is alert.   Psychiatric:         Mood and Affect: Mood normal.         Significant Labs:  ABGs: No results for input(s): "PH", "PCO2", "PO2", "HCO3", "POCSATURATED", "BE" in the last 48 hours.  Amylase: No results for input(s): "AMYLASE" in the last 48 hours.  BMP:   Recent Labs   Lab 05/23/24  0342   *   *   K 3.8   CL 96   CO2 29   BUN 15   CREATININE 1.1   CALCIUM 9.4   MG 2.1     Cardiac markers: No results for input(s): "CKMB", "CPKMB", "TROPONINT", "TROPONINI", "MYOGLOBIN" in the last 48 hours.  CBC:   Recent Labs   Lab 05/23/24  0342   WBC 6.73   RBC 4.63   HGB 7.8*   HCT 29.1*      MCV 63*   MCH 16.8*   MCHC 26.8*     CMP:   Recent Labs   Lab 05/23/24  0342   *   CALCIUM 9.4   ALBUMIN 3.0*   PROT 9.1*   *   K 3.8   CO2 29   CL 96   BUN 15   CREATININE 1.1   ALKPHOS 194*   ALT 13   AST 29   BILITOT 1.8*     Coagulation:   Recent Labs   Lab 05/23/24  0342 05/23/24  0842   INR 1.5*  --    APTT 31.0 " "37.6*     Lactic Acid: No results for input(s): "LACTATE" in the last 48 hours.  LFTs:   Recent Labs   Lab 05/23/24  0342   ALT 13   AST 29   ALKPHOS 194*   BILITOT 1.8*   PROT 9.1*   ALBUMIN 3.0*     Lipase: No results for input(s): "LIPASE" in the last 48 hours.    Significant Diagnostics:  I have reviewed all pertinent imaging results/findings within the past 24 hours.    Procedure: Device Interrogation Including analysis of device parameters  Current Settings: Ventricular Assist Device  Review of device function is stable      5/23/2024     8:08 AM 5/23/2024     3:35 AM 5/22/2024    11:51 PM 5/22/2024     9:06 PM 5/22/2024     4:47 PM 5/22/2024    12:21 PM 5/22/2024     7:55 AM   TXP LVAD INTERROGATIONS   Type HeartMate3    HeartMate3 HeartMate3 HeartMate3   Flow 4 3.9 4.3 4.3 4.1 4 4.5   Speed 5100 5100 5100 5100 5100 5100 5150   PI 6.7 6.3 5.2 5.1 6.5 6 4.6   Power (Serna) 3.7 3.5 3.6 3.6 3.7 3.6 3.5   LSL 4700  4700  4700 4700 4700   Pulsatility   No Pulse           Assessment/Plan:     LVAD (left ventricular assist device) present  - Interval History was obtained from team member  during rounding today.  - VAD/ANABELLE teaching was performed with patient.  - Mobilization/Physical Therapy is ongoing.  - Anticoagulation ongoing INR 1.5. Started on heparin gtt   - Admitted with volume overload and malfunctioning PICC   - PICC replaced with some bleeding from site   - WBC 6.7  - H/H 7.8/29  - BUN/Creatinine 15/1.1  -   - Net negative 2L / made 3.7L    - Remains on milrinone     More than 50 percent of the care dominated counseling and coordinating care with different team members. The VAD was interrogated and any significant findings noted above.         Rajani Jackson PA-C  Cardiothoracic Surgery  [facility]  "

## 2024-05-23 NOTE — PLAN OF CARE
Problem: Adult Inpatient Plan of Care  Goal: Plan of Care Review  5/23/2024 1019 by Td Manjarrez RN  Outcome: Progressing  5/23/2024 1019 by Td Manjarrez RN  Outcome: Progressing  Goal: Patient-Specific Goal (Individualized)  Outcome: Progressing  Goal: Absence of Hospital-Acquired Illness or Injury  Outcome: Progressing  Goal: Optimal Comfort and Wellbeing  Outcome: Progressing  Goal: Readiness for Transition of Care  Outcome: Progressing     Problem: Diabetes Comorbidity  Goal: Blood Glucose Level Within Targeted Range  Outcome: Progressing     Problem: Infection  Goal: Absence of Infection Signs and Symptoms  Outcome: Progressing     Problem: Fall Injury Risk  Goal: Absence of Fall and Fall-Related Injury  Outcome: Progressing     Problem: Ventricular Assist Device  Goal: Optimal Adjustment to Device  Outcome: Progressing  Goal: Absence of Bleeding  Outcome: Progressing  Goal: Absence of Embolism Signs and Symptoms  Outcome: Progressing  Goal: Optimal Blood Flow  Outcome: Progressing  Goal: Absence of Infection Signs and Symptoms  Outcome: Progressing  Goal: Effective Right-Sided Heart Function  Outcome: Progressing     Problem: Ventricular Assist Device  Goal: Optimal Adjustment to Device  Outcome: Progressing  Goal: Absence of Bleeding  Outcome: Progressing  Goal: Absence of Embolism Signs and Symptoms  Outcome: Progressing  Goal: Optimal Blood Flow  Outcome: Progressing  Goal: Absence of Infection Signs and Symptoms  Outcome: Progressing  Goal: Effective Right-Sided Heart Function  Outcome: Progressing

## 2024-05-23 NOTE — SUBJECTIVE & OBJECTIVE
"Interval HPI:   No acute events overnight. Patient in room 304/304 A. Blood glucose variable. BG at, above, and below goal on current insulin regimen (SSI, prandial, and basal insulin ). Steroid use- None.      Renal function- Normal   Vasopressors-  None     Diet diabetic 2000 Calorie; Fluid - 1500mL     Eatin%  Nausea: No  Hypoglycemia and intervention: No  Fever: No  TPN and/or TF: No  BP (!) 88/0 (BP Location: Right arm, Patient Position: Lying)   Pulse (!) 112   Temp 96.4 °F (35.8 °C) (Oral)   Resp 18   Ht 6' 3" (1.905 m)   Wt 72.2 kg (159 lb 2.8 oz)   SpO2 97%   BMI 19.90 kg/m²     Labs Reviewed and Include    Recent Labs   Lab 24  0342   *   CALCIUM 9.4   ALBUMIN 3.0*   PROT 9.1*   *   K 3.8   CO2 29   CL 96   BUN 15   CREATININE 1.1   ALKPHOS 194*   ALT 13   AST 29   BILITOT 1.8*     Lab Results   Component Value Date    WBC 6.73 2024    HGB 7.8 (L) 2024    HCT 29.1 (L) 2024    MCV 63 (L) 2024     2024     No results for input(s): "TSH", "FREET4" in the last 168 hours.  Lab Results   Component Value Date    HGBA1C >14.0 (H) 2024       Nutritional status:   Body mass index is 19.9 kg/m².  Lab Results   Component Value Date    ALBUMIN 3.0 (L) 2024    ALBUMIN 2.8 (L) 2024    ALBUMIN 2.8 (L) 2024     Lab Results   Component Value Date    PREALBUMIN 8 (L) 2024    PREALBUMIN 12 (L) 2024    PREALBUMIN 13 (L) 2024       Estimated Creatinine Clearance: 92.1 mL/min (based on SCr of 1.1 mg/dL).    Accu-Checks  Recent Labs     24  0754 24  1137 24  1654 24  1931 24  2110 24  0022 24  0758 24  1106 24  1607 24  2059   POCTGLUCOSE 335* 306* 280* 360* 399* 365* 248* 93 101 231*       Current Medications and/or Treatments Impacting Glycemic Control  Immunotherapy:    Immunosuppressants       None          Steroids:   Hormones (From admission, onward) "      None          Pressors:    Autonomic Drugs (From admission, onward)      None          Hyperglycemia/Diabetes Medications:   Antihyperglycemics (From admission, onward)      Start     Stop Route Frequency Ordered    05/23/24 0900  insulin glargine U-100 (Lantus) pen 20 Units         -- SubQ 2 times daily 05/23/24 0700    05/23/24 0715  insulin aspart U-100 pen 12 Units         -- SubQ 3 times daily with meals 05/22/24 2153    05/20/24 1822  insulin aspart U-100 pen 0-10 Units         -- SubQ Before meals & nightly PRN 05/20/24 1722

## 2024-05-23 NOTE — CARE UPDATE
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    Acute on chronic combined systolic and diastolic heart failure    Insomnia    Tachycardia    Palliative care encounter    Familial dilated cardiomyopathy    LVAD (left ventricular assist device) present    On mechanically assisted ventilation    Hyponatremia    Seizure-like activity    Temporal lobe seizure    Right heart failure secondary to left heart failure-post LVAD surgery    History of substance abuse    Infected defibrillator now s/p removal Nov 2023    Pleural effusion    Noncompliance with medication regimen    Moderate malnutrition    Receiving inotropic medication    Chronic anticoagulation    Type 2 diabetes mellitus with hyperosmolar hyperglycemic state (HHS)    HTN (hypertension)    Acute decompensated heart failure    Type 2 diabetes mellitus with hyperglycemia, with long-term current use of insulin    Muscle cramping    Goals of care, counseling/discussion    Advance care planning    Hypokalemia       Patient is homebound due to:   Diet: Low Sodium  Acitivities: As Tolerated    Nursing:   SN to complete comprehensive assessment including routine vital signs. Instruct on disease process and s/s of complications to report to MD. Review/verify medication list sent home with the patient at time of discharge  and instruct patient/caregiver as needed. Frequency may be adjusted depending on start of care date.    Notify MD if SBP > 160 or < 90; DBP > 90 or < 50; HR > 120 or < 50; Temp > 101; Weight gain >3lbs in 1 day or 5lbs in 1 week.    LABS:    to perform labs: weekly CMP and mg    HOME INFUSION THERAPY:   SN to perform Infusion Therapy/Central Line Care.  Review Central Line Care & Central Line Flush with patient.    Administer (drug and dose): IV milrinone 0.25 mcg/kg/min x 94.4kg     **For questions or concerns, please call [phone] and ask for Pre-Heart transplant clinic, M-F 8-5. After hours, weekends, call [phone] and ask for the Heart Transplant Cardiologist on call.**    Central line care:  Scrub the Hub: Prior to accessing the line, always perform a 30 second alcohol scrub  Each lumen of the central line is to be flushed at least daily with 10 mL Normal Saline and 3 mL Heparin flush (100 units/mL)  Skilled Nurse (SN) may draw blood from IV access  Blood Draw Procedure:   - Aspirate at least 5 mL of blood   - Discard   - Obtain specimen   - Change posiflow cap   - Flush with 20 mL Normal Saline followed by a                 3-5 mL Heparin flush (100 units/mL)  :   - Sterile dressing changes are done weekly and as needed.   - Use chlor-hexadine scrub to cleanse site, apply Biopatch to insertion site,       apply securement device dressing   - Posi-flow caps are changed weekly and after EVERY lab draw.   - If sterile gauze is under dressing to control oozing,                 dressing change must be performed every 24 hours until gauze is not needed.        Send initial Home Health orders to [facility] attending physician on call.  Send follow up questions to [phone] or fax:              Heart Failure:      [phone]

## 2024-05-23 NOTE — SUBJECTIVE & OBJECTIVE
Interval History: NAEON. No acute complaints. CVP 11 this AM.     Continuous Infusions:   heparin (porcine) in D5W  0-40 Units/kg/hr (Adjusted) Intravenous Continuous 9 mL/hr at 05/23/24 0752 12 Units/kg/hr at 05/23/24 0752    milrinone 20mg/100ml D5W (200mcg/ml)  0.25 mcg/kg/min Intravenous Continuous 7.1 mL/hr at 05/22/24 0814 0.25 mcg/kg/min at 05/22/24 0814     Scheduled Meds:   amLODIPine  5 mg Oral Daily    aspirin  81 mg Oral Daily    atorvastatin  40 mg Oral Daily    cefadroxil  500 mg Oral Q12H    furosemide (LASIX) injection  80 mg Intravenous Q8H    insulin aspart U-100  12 Units Subcutaneous TIDWM    insulin glargine U-100  20 Units Subcutaneous BID    levETIRAcetam  1,500 mg Oral BID    magnesium oxide  400 mg Oral Daily    mirtazapine  15 mg Oral Nightly    pantoprazole  40 mg Oral Daily    sodium chloride 0.9%  10 mL Intravenous Q6H    spironolactone  25 mg Oral Daily    warfarin  6 mg Oral Daily     PRN Meds:  Current Facility-Administered Medications:     acetaminophen, 650 mg, Oral, Q6H PRN    cyclobenzaprine, 5 mg, Oral, TID PRN    dextrose 10%, 12.5 g, Intravenous, PRN    dextrose 10%, 25 g, Intravenous, PRN    glucagon (human recombinant), 1 mg, Intramuscular, PRN    glucose, 16 g, Oral, PRN    glucose, 24 g, Oral, PRN    insulin aspart U-100, 0-10 Units, Subcutaneous, QID (AC + HS) PRN    Flushing PICC/Midline Protocol, , , Until Discontinued **AND** sodium chloride 0.9%, 10 mL, Intravenous, Q6H **AND** sodium chloride 0.9%, 10 mL, Intravenous, PRN    Review of patient's allergies indicates:   Allergen Reactions    Bumex [bumetanide] Hives    Torsemide Hives     Objective:     Vital Signs (Most Recent):  Temp: 97.1 °F (36.2 °C) (05/23/24 1121)  Pulse: (!) 113 (05/23/24 1121)  Resp: 18 (05/23/24 1121)  BP: 102/63 (05/23/24 1121)  SpO2: 98 % (05/23/24 0924) Vital Signs (24h Range):  Temp:  [96.4 °F (35.8 °C)-98.1 °F (36.7 °C)] 97.1 °F (36.2 °C)  Pulse:  [101-119] 113  Resp:  [18-20] 18  SpO2:   [97 %-100 %] 98 %  BP: ()/(0-68) 102/63     Patient Vitals for the past 72 hrs (Last 3 readings):   Weight   05/23/24 0745 72.2 kg (159 lb 2.8 oz)   05/22/24 1424 74.7 kg (164 lb 10.9 oz)   05/21/24 0748 84.5 kg (186 lb 4.6 oz)     Body mass index is 19.9 kg/m².      Intake/Output Summary (Last 24 hours) at 5/23/2024 1144  Last data filed at 5/23/2024 0810  Gross per 24 hour   Intake 942 ml   Output 3950 ml   Net -3008 ml       Hemodynamic Parameters:  CVP:  [11 mmHg] 11 mmHg         Physical Exam  Constitutional:       General: He is not in acute distress.     Appearance: Normal appearance.   HENT:      Head: Normocephalic and atraumatic.   Eyes:      Conjunctiva/sclera: Conjunctivae normal.   Neck:      Vascular: JVD present.      Comments: JVP 13cm of H20  Cardiovascular:      Comments: VAD hum appreciated  Pulmonary:      Breath sounds: Normal breath sounds.      Comments: Clear to auscultation  Abdominal:      Comments: Soft, non-tender, non-distended   Musculoskeletal:      Cervical back: Normal range of motion.   Skin:     General: Skin is warm and dry.      Capillary Refill: Capillary refill takes less than 2 seconds.   Neurological:      General: No focal deficit present.      Mental Status: He is alert and oriented to person, place, and time.   Psychiatric:         Mood and Affect: Mood and affect normal.            Significant Labs:  CBC:  Recent Labs   Lab 05/21/24  0422 05/22/24  0424 05/23/24  0342   WBC 6.27 6.87 6.73   RBC 3.98* 4.12* 4.63   HGB 6.9* 7.1* 7.8*   HCT 25.0* 25.7* 29.1*    268 297   MCV 63* 62* 63*   MCH 17.3* 17.2* 16.8*   MCHC 27.6* 27.6* 26.8*     BNP:  Recent Labs   Lab 05/20/24  1422 05/20/24  1550 05/22/24  0424   BNP 1,148* 1,168* 777*     CMP:  Recent Labs   Lab 05/21/24  0422 05/21/24  1423 05/22/24  0424 05/22/24  1657 05/23/24  0342   *  --  227*  --  205*   CALCIUM 8.6*  --  8.9  --  9.4   ALBUMIN 2.8*  --  2.8*  --  3.0*   PROT 8.2  --  8.4  --  9.1*    *  --  137  --  133*   K 3.4*   < > 3.4* 3.8 3.8   CO2 23  --  30*  --  29     --  99  --  96   BUN 13  --  12  --  15   CREATININE 1.0  --  0.9  --  1.1   ALKPHOS 163*  --  174*  --  194*   ALT 10  --  8*  --  13   AST 24  --  28  --  29   BILITOT 1.8*  --  1.7*  --  1.8*    < > = values in this interval not displayed.      Coagulation:   Recent Labs   Lab 05/21/24  0422 05/22/24  0424 05/23/24  0342 05/23/24  0842   INR 4.1* 2.2* 1.5*  --    APTT 37.4* 33.3* 31.0 37.6*     LDH:  Recent Labs   Lab 05/20/24  1422 05/20/24  1550 05/21/24  0422 05/22/24  0424 05/23/24  0342   * 295* 281* 283* 295*     Microbiology:  Microbiology Results (last 7 days)       Procedure Component Value Units Date/Time    Aerobic culture [[id number]] Collected: 05/20/24 1653    Order Status: Completed Specimen: Wound from Abdomen Updated: 05/23/24 1053     Aerobic Bacterial Culture No growth    Narrative:      LEXX ALVAREZ    Culture, Anaerobe [[id number]] Collected: 05/20/24 1653    Order Status: Completed Specimen: Wound from Abdomen Updated: 05/22/24 0946     Anaerobic Culture Culture in progress    Narrative:      LEXX ALVAREZ    Gram stain [[id number]] Collected: 05/20/24 1653    Order Status: Completed Specimen: Wound from Abdomen Updated: 05/20/24 1957     Gram Stain Result No WBC's      No organisms seen    Narrative:      LEXX ALVAREZ            I have reviewed all pertinent labs within the past 24 hours.    Estimated Creatinine Clearance: 92.1 mL/min (based on SCr of 1.1 mg/dL).    Diagnostic Results:  I have reviewed all pertinent imaging results/findings within the past 24 hours.

## 2024-05-23 NOTE — PROGRESS NOTES
"[facility]  Endocrinology  Progress Note    Admit Date: 2024     Reason for Consult: Management of T2DM, Hyperglycemia      Surgical Procedure and Date: LVAD 2022     Diabetes diagnosis year:      Home Diabetes Medications:   -Levemir 20 units BID   -Novolog 18 units TIDWM in addition to the following correction scale:    150 - 200 + 2 unit    201 - 250 + 4 units    251 - 300 + 6 units    301 - 350 + 8 units       > 350   + 10 units     How often checking glucose at home?  > 4 times per day  BG readings on regimen: 180's-200's  Hypoglycemia on the regimen?  No  Missed doses on regimen?  occasionally skips mealsn and will skip Novolog with breakfast      Diabetes Complications include:     Hyperglycemia     Complicating diabetes co morbidities:   History of MI, CHF, and CKD       Interval HPI:   No acute events overnight. Patient in room 304/304 A. Blood glucose stable. BG at, above goal on current insulin regimen (SSI, prandial, and basal insulin ). Steroid use- None.      Renal function- Normal   Vasopressors-  None     Diet diabetic 2000 Calorie; Fluid - 1500mL     Eatin%  Nausea: No  Hypoglycemia and intervention: No  Fever: No  TPN and/or TF: No  BP (!) 88/0 (BP Location: Right arm, Patient Position: Lying)   Pulse (!) 112   Temp 96.4 °F (35.8 °C) (Oral)   Resp 18   Ht 6' 3" (1.905 m)   Wt 72.2 kg (159 lb 2.8 oz)   SpO2 97%   BMI 19.90 kg/m²     Labs Reviewed and Include    Recent Labs   Lab 24  0342   *   CALCIUM 9.4   ALBUMIN 3.0*   PROT 9.1*   *   K 3.8   CO2 29   CL 96   BUN 15   CREATININE 1.1   ALKPHOS 194*   ALT 13   AST 29   BILITOT 1.8*     Lab Results   Component Value Date    WBC 6.73 2024    HGB 7.8 (L) 2024    HCT 29.1 (L) 2024    MCV 63 (L) 2024     2024     No results for input(s): "TSH", "FREET4" in the last 168 hours.  Lab Results   Component Value Date    HGBA1C >14.0 (H) 2024 "       Nutritional status:   Body mass index is 19.9 kg/m².  Lab Results   Component Value Date    ALBUMIN 3.0 (L) 05/23/2024    ALBUMIN 2.8 (L) 05/22/2024    ALBUMIN 2.8 (L) 05/21/2024     Lab Results   Component Value Date    PREALBUMIN 8 (L) 05/20/2024    PREALBUMIN 12 (L) 04/26/2024    PREALBUMIN 13 (L) 04/24/2024       Estimated Creatinine Clearance: 92.1 mL/min (based on SCr of 1.1 mg/dL).    Accu-Checks  Recent Labs     05/21/24  0754 05/21/24  1137 05/21/24  1654 05/21/24  1931 05/21/24  2110 05/22/24  0022 05/22/24  0758 05/22/24  1106 05/22/24  1607 05/22/24 2059   POCTGLUCOSE 335* 306* 280* 360* 399* 365* 248* 93 101 231*       Current Medications and/or Treatments Impacting Glycemic Control  Immunotherapy:    Immunosuppressants       None          Steroids:   Hormones (From admission, onward)      None          Pressors:    Autonomic Drugs (From admission, onward)      None          Hyperglycemia/Diabetes Medications:   Antihyperglycemics (From admission, onward)      Start     Stop Route Frequency Ordered    05/23/24 0900  insulin glargine U-100 (Lantus) pen 20 Units         -- SubQ 2 times daily 05/23/24 0700    05/23/24 0715  insulin aspart U-100 pen 12 Units         -- SubQ 3 times daily with meals 05/22/24 2153    05/20/24 1822  insulin aspart U-100 pen 0-10 Units         -- SubQ Before meals & nightly PRN 05/20/24 1722            ASSESSMENT and PLAN    Cardiac/Vascular  * Acute on chronic combined systolic and diastolic heart failure  Managed per primary team  Avoid hypoglycemia        LVAD (left ventricular assist device) present  Managed per primary team  Avoid hypoglycemia        Endocrine  Type 2 diabetes mellitus with hyperglycemia, with long-term current use of insulin  BG goal 140-180    - Lantus (Insulin Glargine) 20 units BID   - Novolog (Insulin Aspart) 12 units TIDWM (20% reduction given post-prandial BG below goal) and prn for BG excursions MDC SSI (150/25)  - Hypoglycemia protocol in  place      ** Please notify Endocrine for any change and/or advance in diet**  ** Please call Endocrine for any BG related issues **    Discharge Planning:   TBD. Please notify endocrinology prior to discharge.            Susanna Doty PA-C  Endocrinology  [facility]

## 2024-05-23 NOTE — PROGRESS NOTES
[facility]  Heart Transplant  Progress Note    Patient Name: Kevan Queen  MRN: [id number]  Admission Date: 5/20/2024  Hospital Length of Stay: 3 days  Attending Physician: Luca Lopez Jr.*  Primary Care Provider: Rosio Armendariz FNP  Principal Problem:Acute on chronic combined systolic and diastolic heart failure    Subjective:   Interval History: NAEON. No acute complaints. CVP 11 this AM.     Continuous Infusions:   heparin (porcine) in D5W  0-40 Units/kg/hr (Adjusted) Intravenous Continuous 9 mL/hr at 05/23/24 0752 12 Units/kg/hr at 05/23/24 0752    milrinone 20mg/100ml D5W (200mcg/ml)  0.25 mcg/kg/min Intravenous Continuous 7.1 mL/hr at 05/22/24 0814 0.25 mcg/kg/min at 05/22/24 0814     Scheduled Meds:   amLODIPine  5 mg Oral Daily    aspirin  81 mg Oral Daily    atorvastatin  40 mg Oral Daily    cefadroxil  500 mg Oral Q12H    furosemide (LASIX) injection  80 mg Intravenous Q8H    insulin aspart U-100  12 Units Subcutaneous TIDWM    insulin glargine U-100  20 Units Subcutaneous BID    levETIRAcetam  1,500 mg Oral BID    magnesium oxide  400 mg Oral Daily    mirtazapine  15 mg Oral Nightly    pantoprazole  40 mg Oral Daily    sodium chloride 0.9%  10 mL Intravenous Q6H    spironolactone  25 mg Oral Daily    warfarin  6 mg Oral Daily     PRN Meds:  Current Facility-Administered Medications:     acetaminophen, 650 mg, Oral, Q6H PRN    cyclobenzaprine, 5 mg, Oral, TID PRN    dextrose 10%, 12.5 g, Intravenous, PRN    dextrose 10%, 25 g, Intravenous, PRN    glucagon (human recombinant), 1 mg, Intramuscular, PRN    glucose, 16 g, Oral, PRN    glucose, 24 g, Oral, PRN    insulin aspart U-100, 0-10 Units, Subcutaneous, QID (AC + HS) PRN    Flushing PICC/Midline Protocol, , , Until Discontinued **AND** sodium chloride 0.9%, 10 mL, Intravenous, Q6H **AND** sodium chloride 0.9%, 10 mL, Intravenous, PRN    Review of patient's allergies indicates:   Allergen Reactions    Bumex  [bumetanide] Hives    Torsemide Hives     Objective:     Vital Signs (Most Recent):  Temp: 97.1 °F (36.2 °C) (05/23/24 1121)  Pulse: (!) 113 (05/23/24 1121)  Resp: 18 (05/23/24 1121)  BP: 102/63 (05/23/24 1121)  SpO2: 98 % (05/23/24 0924) Vital Signs (24h Range):  Temp:  [96.4 °F (35.8 °C)-98.1 °F (36.7 °C)] 97.1 °F (36.2 °C)  Pulse:  [101-119] 113  Resp:  [18-20] 18  SpO2:  [97 %-100 %] 98 %  BP: ()/(0-68) 102/63     Patient Vitals for the past 72 hrs (Last 3 readings):   Weight   05/23/24 0745 72.2 kg (159 lb 2.8 oz)   05/22/24 1424 74.7 kg (164 lb 10.9 oz)   05/21/24 0748 84.5 kg (186 lb 4.6 oz)     Body mass index is 19.9 kg/m².      Intake/Output Summary (Last 24 hours) at 5/23/2024 1144  Last data filed at 5/23/2024 0810  Gross per 24 hour   Intake 942 ml   Output 3950 ml   Net -3008 ml       Hemodynamic Parameters:  CVP:  [11 mmHg] 11 mmHg         Physical Exam  Constitutional:       General: He is not in acute distress.     Appearance: Normal appearance.   HENT:      Head: Normocephalic and atraumatic.   Eyes:      Conjunctiva/sclera: Conjunctivae normal.   Neck:      Vascular: JVD present.      Comments: JVP 13cm of H20  Cardiovascular:      Comments: VAD hum appreciated  Pulmonary:      Breath sounds: Normal breath sounds.      Comments: Clear to auscultation  Abdominal:      Comments: Soft, non-tender, non-distended   Musculoskeletal:      Cervical back: Normal range of motion.   Skin:     General: Skin is warm and dry.      Capillary Refill: Capillary refill takes less than 2 seconds.   Neurological:      General: No focal deficit present.      Mental Status: He is alert and oriented to person, place, and time.   Psychiatric:         Mood and Affect: Mood and affect normal.            Significant Labs:  CBC:  Recent Labs   Lab 05/21/24  0422 05/22/24  0424 05/23/24  0342   WBC 6.27 6.87 6.73   RBC 3.98* 4.12* 4.63   HGB 6.9* 7.1* 7.8*   HCT 25.0* 25.7* 29.1*    268 297   MCV 63* 62* 63*    MCH 17.3* 17.2* 16.8*   MCHC 27.6* 27.6* 26.8*     BNP:  Recent Labs   Lab 05/20/24 1422 05/20/24  1550 05/22/24 0424   BNP 1,148* 1,168* 777*     CMP:  Recent Labs   Lab 05/21/24 0422 05/21/24  1423 05/22/24  0424 05/22/24  1657 05/23/24 0342   *  --  227*  --  205*   CALCIUM 8.6*  --  8.9  --  9.4   ALBUMIN 2.8*  --  2.8*  --  3.0*   PROT 8.2  --  8.4  --  9.1*   *  --  137  --  133*   K 3.4*   < > 3.4* 3.8 3.8   CO2 23  --  30*  --  29     --  99  --  96   BUN 13  --  12  --  15   CREATININE 1.0  --  0.9  --  1.1   ALKPHOS 163*  --  174*  --  194*   ALT 10  --  8*  --  13   AST 24  --  28  --  29   BILITOT 1.8*  --  1.7*  --  1.8*    < > = values in this interval not displayed.      Coagulation:   Recent Labs   Lab 05/21/24 0422 05/22/24 0424 05/23/24 0342 05/23/24  0842   INR 4.1* 2.2* 1.5*  --    APTT 37.4* 33.3* 31.0 37.6*     LDH:  Recent Labs   Lab 05/20/24 1422 05/20/24  1550 05/21/24 0422 05/22/24 0424 05/23/24  0342   * 295* 281* 283* 295*     Microbiology:  Microbiology Results (last 7 days)       Procedure Component Value Units Date/Time    Aerobic culture [[id number]] Collected: 05/20/24 1653    Order Status: Completed Specimen: Wound from Abdomen Updated: 05/23/24 1053     Aerobic Bacterial Culture No growth    Narrative:      VAD DLES    Culture, Anaerobe [[id number]] Collected: 05/20/24 1653    Order Status: Completed Specimen: Wound from Abdomen Updated: 05/22/24 0946     Anaerobic Culture Culture in progress    Narrative:      VAD DLES    Gram stain [[id number]] Collected: 05/20/24 1653    Order Status: Completed Specimen: Wound from Abdomen Updated: 05/20/24 1957     Gram Stain Result No WBC's      No organisms seen    Narrative:      LEXX ALVAREZ            I have reviewed all pertinent labs within the past 24 hours.    Estimated Creatinine Clearance: 92.1 mL/min (based on SCr of 1.1 mg/dL).    Diagnostic Results:  I have reviewed all pertinent imaging  results/findings within the past 24 hours.  Assessment and Plan:     Mr. 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 He underwent RVAD removal and chest closure 6/30/2022.  He also completed a course of IV Abx for staph epi. He was weaned off  but he had to restarted due to RVF. He was eventually transitioned to milrinone (secondary to  shortage) now on 0.25 mcg/kg/min. He has a history of unclear syncope vs seizures and is followed by neuro for this and is on Keppra.       On 11/15/23 underwent removal of eroded [employer] scICD--no Lifevest (due to LVAD) and no plan to replace ICD as not requiring therapy post LVAD with concern for reinfection.  He has not had neurology f/u with seizure hx on keppra so coordinator to assist him with obtaining appt.    Patient presented today for routine HTS clinic appointment but was found to hunched over in a chair w/ marked dyspnea. Patient sent directly to the ED for further evaluation. States Picc line stopped working last night. Patient currently endorses chronic SOB w.exertion, but denies any SOB at rest, orthopnea, chest pain, fever, chills, nausea, vomiting, abdominal pain, abd distention, PND. States instead of take Lasix 80mg daily, he was taking Lasix 40mg bid.     Of note, patient was most recently hospitalized from 4/20-4/26 for ADHF and hyperglycemia. patient noted to be hyperglycemia w/ glucose>500 and was started on insulin drip and then switched to basal bolus dosing as per Endocrinology once hyperglycemia resolved. Patient was also started on IV lasix for ADHF. Patient throughout the hospital course had difficulty taking lasix due to extreme cramping not related to  electrolyte de-arrangement. Discussion was had with patient on whether he would rather switch to hospice care patient stated he would rather live with the discomfort associated w/ lasix. At the time of discharge patient was maintaining euvolemia w/ lasix PO 80mg daily.    Home Medications:   amlodipine besylate 5 mg Oral Daily  aspirin 81 mg Oral Daily  atorvastatin calcium 40 mg Oral Daily  cefadroxil 500 mg Oral Every 12 hours  cyclobenzaprine HCl 5 mg Oral 3 times daily PRN  furosemide 80 mg Oral Daily  Insulin detemir 20 units bid  Insulin aspart 18units tid  Keppra 1500mg bid  magnesium oxide 400 mg Oral Daily  milrinone lactate,milrinone lactate/D5W 0.25 mcg/kg/min (23.6 mcg/min) Intravenous Continuous  mirtazapine 15 mg Oral Nightly  pantoprazole sodium 40 mg Oral Daily  potassium chloride 10 MEQ 30 mEq Oral Daily  spironolactone 25 mg Oral Daily  warfarin sodium 3 MG Take 1.5 tablets (4.5mg) orally daily, except 1 tablet (3mg) on Sundays and Fridays        Cardiac Imaging:   Echo (9/2/2023): HM3 5100. AV does not open. IV septum midline. LV sevewrely dilated w/ normal ventricular mass, normal wall thickness, normal wall motion, severely reduced systolic function w/ EF 10-15%, and normal diastolic function. RV normal cavity size w/ normal wall thickness, normal wall motion, and normal systolic function. Mild MR. Mod to severe TR. PA sys pressure 38mmHg. LVIDd 7.11cm.     RHC (10/31/2022): RA 18, PWP 21, CO 3.55, CI 1.6. On mil 0.125 and HM3 5100.     * Acute on chronic combined systolic and diastolic heart failure  -NICM s/p HM 3 on home Milrinone   -Last 2D Echo  9/5/23 : LVEF 10-15%, LVEDD  7.11 cm with speed at 5100  -Last RHC: 10/31/22 with speed at 5100 on Milrinone 0.125 RA: 18, RV: 35/7 (22), PAP: 35/19 (24), PWP: 21, CO: 3.55, CI: 1.6  -Hypervolemic on examination today. Continue IV lasix 80mg tid. Cyclobenzaprine to be given w/ lasix to prevent cramping (historicaly not related to  electrolytes)  -Continue home milrinone 0.25  -GDMT with N/A  -2g Na dietary restriction, 1500 mL fluid restriction, strict I/Os    LVAD (left ventricular assist device) present  Procedure: Device Interrogation Including analysis of device parameters  Current Settings: Ventricular Assist Device  Review of device function is stable/unstable stable  Anticoagulation w/ coumadin. Goal INR 2-3. S/p 1mg Vit K on 5/21. Currenly subtherapeuic. Will start coumadin w/ heparin bridge.         5/23/2024     8:08 AM 5/23/2024     3:35 AM 5/22/2024    11:51 PM 5/22/2024     9:06 PM 5/22/2024     4:47 PM 5/22/2024    12:21 PM 5/22/2024     7:55 AM   TXP LVAD INTERROGATIONS   Type HeartMate3    HeartMate3 HeartMate3 HeartMate3   Flow 4 3.9 4.3 4.3 4.1 4 4.5   Speed 5100 5100 5100 5100 5100 5100 5150   PI 6.7 6.3 5.2 5.1 6.5 6 4.6   Power (Serna) 3.7 3.5 3.6 3.6 3.7 3.6 3.5   LSL 4700  4700  4700 4700 4700   Pulsatility   No Pulse             Seizure-like activity  Continue home keppra.     Type 2 diabetes mellitus with hyperglycemia, with long-term current use of insulin  Management as per Endocrinology.     HTN (hypertension)  - continue amlodopine.         Chantal Herndon MD  Heart Transplant  [facility]

## 2024-05-23 NOTE — SUBJECTIVE & OBJECTIVE
Interval History: NAEON. Remains on milrinone. Started on heparin gtt. INR 1.5.       Medications:  Continuous Infusions:   heparin (porcine) in D5W  0-40 Units/kg/hr (Adjusted) Intravenous Continuous 9 mL/hr at 05/23/24 0752 12 Units/kg/hr at 05/23/24 0752    milrinone 20mg/100ml D5W (200mcg/ml)  0.25 mcg/kg/min Intravenous Continuous 7.1 mL/hr at 05/22/24 0814 0.25 mcg/kg/min at 05/22/24 0814     Scheduled Meds:   amLODIPine  5 mg Oral Daily    aspirin  81 mg Oral Daily    atorvastatin  40 mg Oral Daily    cefadroxil  500 mg Oral Q12H    furosemide (LASIX) injection  80 mg Intravenous Q8H    insulin aspart U-100  12 Units Subcutaneous TIDWM    insulin glargine U-100  20 Units Subcutaneous BID    levETIRAcetam  1,500 mg Oral BID    magnesium oxide  400 mg Oral Daily    mirtazapine  15 mg Oral Nightly    pantoprazole  40 mg Oral Daily    sodium chloride 0.9%  10 mL Intravenous Q6H    spironolactone  25 mg Oral Daily    warfarin  5 mg Oral Daily     PRN Meds:  Current Facility-Administered Medications:     acetaminophen, 650 mg, Oral, Q6H PRN    cyclobenzaprine, 5 mg, Oral, TID PRN    dextrose 10%, 12.5 g, Intravenous, PRN    dextrose 10%, 25 g, Intravenous, PRN    glucagon (human recombinant), 1 mg, Intramuscular, PRN    glucose, 16 g, Oral, PRN    glucose, 24 g, Oral, PRN    insulin aspart U-100, 0-10 Units, Subcutaneous, QID (AC + HS) PRN    Flushing PICC/Midline Protocol, , , Until Discontinued **AND** sodium chloride 0.9%, 10 mL, Intravenous, Q6H **AND** sodium chloride 0.9%, 10 mL, Intravenous, PRN     Objective:     Vital Signs (Most Recent):  Temp: 96.4 °F (35.8 °C) (05/23/24 0726)  Pulse: 110 (05/23/24 0924)  Resp: 18 (05/23/24 0726)  BP: (!) 88/0 (05/23/24 0807)  SpO2: 98 % (05/23/24 0924) Vital Signs (24h Range):  Temp:  [96.4 °F (35.8 °C)-98.1 °F (36.7 °C)] 96.4 °F (35.8 °C)  Pulse:  [101-119] 110  Resp:  [18-20] 18  SpO2:  [97 %-100 %] 98 %  BP: ()/(0-77) 88/0       Intake/Output Summary (Last 24  "hours) at 5/23/2024 0957  Last data filed at 5/23/2024 0810  Gross per 24 hour   Intake 942 ml   Output 3950 ml   Net -3008 ml       Physical Exam  Vitals reviewed.   Constitutional:       Appearance: He is well-developed.   HENT:      Head: Normocephalic and atraumatic.   Cardiovascular:      Comments: VAD  Pulmonary:      Effort: Pulmonary effort is normal. No respiratory distress.   Skin:     Coloration: Skin is not pale.   Neurological:      General: No focal deficit present.      Mental Status: He is alert.   Psychiatric:         Mood and Affect: Mood normal.         Significant Labs:  ABGs: No results for input(s): "PH", "PCO2", "PO2", "HCO3", "POCSATURATED", "BE" in the last 48 hours.  Amylase: No results for input(s): "AMYLASE" in the last 48 hours.  BMP:   Recent Labs   Lab 05/23/24  0342   *   *   K 3.8   CL 96   CO2 29   BUN 15   CREATININE 1.1   CALCIUM 9.4   MG 2.1     Cardiac markers: No results for input(s): "CKMB", "CPKMB", "TROPONINT", "TROPONINI", "MYOGLOBIN" in the last 48 hours.  CBC:   Recent Labs   Lab 05/23/24  0342   WBC 6.73   RBC 4.63   HGB 7.8*   HCT 29.1*      MCV 63*   MCH 16.8*   MCHC 26.8*     CMP:   Recent Labs   Lab 05/23/24  0342   *   CALCIUM 9.4   ALBUMIN 3.0*   PROT 9.1*   *   K 3.8   CO2 29   CL 96   BUN 15   CREATININE 1.1   ALKPHOS 194*   ALT 13   AST 29   BILITOT 1.8*     Coagulation:   Recent Labs   Lab 05/23/24  0342 05/23/24  0842   INR 1.5*  --    APTT 31.0 37.6*     Lactic Acid: No results for input(s): "LACTATE" in the last 48 hours.  LFTs:   Recent Labs   Lab 05/23/24  0342   ALT 13   AST 29   ALKPHOS 194*   BILITOT 1.8*   PROT 9.1*   ALBUMIN 3.0*     Lipase: No results for input(s): "LIPASE" in the last 48 hours.    Significant Diagnostics:  I have reviewed all pertinent imaging results/findings within the past 24 hours.    Procedure: Device Interrogation Including analysis of device parameters  Current Settings: Ventricular Assist " Device  Review of device function is stable      5/23/2024     8:08 AM 5/23/2024     3:35 AM 5/22/2024    11:51 PM 5/22/2024     9:06 PM 5/22/2024     4:47 PM 5/22/2024    12:21 PM 5/22/2024     7:55 AM   TXP LVAD INTERROGATIONS   Type HeartMate3    HeartMate3 HeartMate3 HeartMate3   Flow 4 3.9 4.3 4.3 4.1 4 4.5   Speed 5100 5100 5100 5100 5100 5100 5150   PI 6.7 6.3 5.2 5.1 6.5 6 4.6   Power (Serna) 3.7 3.5 3.6 3.6 3.7 3.6 3.5   LSL 4700  4700  4700 4700 4700   Pulsatility   No Pulse

## 2024-05-23 NOTE — PLAN OF CARE
[facility]   Heart Transplant/V[facility] Clinic   [address] [place] [address]   [phone] after hours          [phone] fax     VAD HOME  HEALTH ORDERS      Admit to Home Health    Diagnosis:   Patient Active Problem List   Diagnosis    Anxiety disorder, unspecified    Anemia of chronic disease    Ecstasy abuse    Cannabis use disorder, severe, dependence    CHF (NYHA class IV, ACC/AHA stage D)    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    Acute on chronic combined systolic and diastolic heart failure    Insomnia    Tachycardia    Palliative care encounter    Familial dilated cardiomyopathy    LVAD (left ventricular assist device) present    On mechanically assisted ventilation    Hyponatremia    Seizure-like activity    Temporal lobe seizure    Right heart failure secondary to left heart failure-post LVAD surgery    History of substance abuse    Infected defibrillator now s/p removal Nov 2023    Pleural effusion    Noncompliance with medication regimen    Moderate malnutrition    Receiving inotropic medication    Chronic anticoagulation    Type 2 diabetes mellitus with hyperosmolar hyperglycemic state (HHS)    HTN (hypertension)    Acute decompensated heart failure    Type 2 diabetes mellitus with hyperglycemia, with long-term current use of insulin    Muscle cramping    Goals of care, counseling/discussion    Advance care planning    Hypokalemia       Patient is homebound due to:  NYHA Class IV HF. S/P LVAD placement.     Diet: Low Fat, Low cholesterol, 2Gm Na, Coumadin restrictions.    Acitivities: No Swimming, bathing, vacuuming, contact sports.    Fresh implants= Sternal Precautions    Nursing:   SN to complete comprehensive assessment including routine vital signs. Instruct on disease process and s/s of complications to report to MD. Review/verify medication list sent home with the patient at time of discharge  and instruct patient/caregiver as needed.  Frequency may be adjusted depending on start of care date.    **LVAD driveline exit site dressing change is to be completed per LVAD patient/caregiver only**.    Notify MD if:  SBP > 120 or < 80;   MAP > 80 or < 65;   HR > 120 or < 60;   Temp > 101;   Weight gain >3lbs in 1 day or 5lbs in 1 week.    LABS:  SN to perform labs: PT/INR per Coumadin clinic [phone].   Follow up INR date: 5/27/2024  No Finger Sticks    HOME INFUSION THERAPY:    SN to perform Infusion Therapy/Central Line Care.  Review Central Line Care & Central Line Flush with patient.    Administer (drug and dose):  IV milrinone 0.25 mcg/kg/min x 94.4kg     PICC/Central line care:  Scrub the Hub: Prior to accessing the line, always perform a 30 second alcohol scrub  Each lumen of the central line is to be flushed at least daily with 10 mL Normal Saline and 3 mL Heparin flush (100 units/mL)  Skilled Nurse (SN) may draw blood from IV access  Blood Draw Procedure:   - Aspirate at least 5 mL of blood   - Discard   - Obtain specimen   - Change posiflow cap   - Flush with 20 mL Normal Saline followed by a                 3-5 mL Heparin flush (100 units/mL)  :   - Sterile dressing changes are done weekly and as needed.   - Use chlor-hexadine scrub to cleanse site, apply Biopatch to insertion site,       apply securement device dressing   - Posi-flow caps are changed weekly and after EVERY lab draw.   - If sterile gauze is under dressing to control oozing,                 dressing change must be performed every 24 hours until gauze is not needed.    Send initial Home Health orders to \[facility]\"" attending physician on call.  Send follow up questions to VAD clinic MD [phone] or [phone].

## 2024-05-23 NOTE — CARE UPDATE
"RAPID RESPONSE NURSE CHART REVIEW        Chart Reviewed: 05/23/2024, 0830 AM    MRN: [id number]  Bed: 304/304 A    Dx: Acute on chronic combined systolic and diastolic heart failure    Kevan Queen has a past medical history of Acute respiratory failure with hypoxia, Arthritis, Awareness alteration, transient, Cardiomyopathy, CHF (congestive heart failure), COVID-19, Diabetes mellitus, Dilated cardiomyopathy, Drug abuse, Headache, Hyperglycemia, Hyperosmolar hyperglycemic state (HHS), ICD (implantable cardioverter-defibrillator) in place, Infected defibrillator now s/p removal Nov 2023, Left ventricular assist device (LVAD) complication, initial encounter, Muscle cramping, Renal disorder, SOB (shortness of breath), and Tingling in extremities.    Last VS: /63 (Patient Position: Lying)   Pulse (!) 113   Temp 97.1 °F (36.2 °C) (Oral)   Resp 18   Ht 6' 3" (1.905 m)   Wt 72.2 kg (159 lb 2.8 oz)   SpO2 98%   BMI 19.90 kg/m²     24H Vital Sign Range:  Temp:  [96.4 °F (35.8 °C)-98.1 °F (36.7 °C)]   Pulse:  [101-119]   Resp:  [18-20]   BP: ()/(0-68)   SpO2:  [97 %-100 %]     Level of Consciousness (AVPU): alert    Recent Labs     05/21/24  0422 05/22/24  0424 05/23/24  0342   WBC 6.27 6.87 6.73   HGB 6.9* 7.1* 7.8*   HCT 25.0* 25.7* 29.1*    268 297       Recent Labs     05/20/24  1422 05/20/24  1550 05/21/24  0422 05/21/24  1423 05/22/24  0424 05/22/24  1657 05/23/24  0342   *   < > 135*  --  137  --  133*   K 3.0*   < > 3.4*   < > 3.4* 3.8 3.8      < > 101  --  99  --  96   CO2 23   < > 23  --  30*  --  29   BUN 12   < > 13  --  12  --  15   CREATININE 1.0   < > 1.0  --  0.9  --  1.1   *   < > 268*  --  227*  --  205*   PHOS 1.9*  --   --   --   --   --   --    MG 1.7   < > 1.9   < > 1.9 1.9 2.1    < > = values in this interval not displayed.        No results for input(s): "PH", "PCO2", "PO2", "HCO3", "POCSATURATED", "BE" in the last 72 hours.     OXYGEN:         "     MEWS score: 4    Charge RN, Marissa  No additional concerns verbalized at this time. Instructed to call [phone] for further concerns or assistance.    Dinora Anthony RN

## 2024-05-23 NOTE — PROGRESS NOTES
Pt AAAO, no family at bedside.  Confirmed pt has 2 months of daily kits in his room to take home.      Received loaner bag from pt, he has his emergency bag in room.   Components of HM3 Loaner bag:  HM3 Controller SN: [id number]  Batteries SN: [id number] and [id number]  2 battery clips: [id number], [id number]     No questions or needs from me at this time.

## 2024-05-23 NOTE — ASSESSMENT & PLAN NOTE
BG goal 140-180    - Lantus (Insulin Glargine) 20 units BID (20% increase due to fasting blood glucose above goal)  - Novolog (Insulin Aspart) 12 units TIDWM and prn for BG excursions MDC SSI (150/25) (reduced based on decreased prandial needs yesterday)  - Hypoglycemia protocol in place      ** Please notify Endocrine for any change and/or advance in diet**  ** Please call Endocrine for any BG related issues **    Discharge Planning:   TBD. Please notify endocrinology prior to discharge.

## 2024-05-24 LAB
ALBUMIN SERPL BCP-MCNC: 2.9 G/DL (ref 3.5–5.2)
ALP SERPL-CCNC: 210 U/L (ref 55–135)
ALT SERPL W/O P-5'-P-CCNC: 17 U/L (ref 10–44)
ANION GAP SERPL CALC-SCNC: 9 MMOL/L (ref 8–16)
APTT PPP: 44.2 SEC (ref 21–32)
APTT PPP: 44.2 SEC (ref 21–32)
AST SERPL-CCNC: 32 U/L (ref 10–40)
BACTERIA SPEC ANAEROBE CULT: NORMAL
BASOPHILS # BLD AUTO: 0.08 K/UL (ref 0–0.2)
BASOPHILS NFR BLD: 1 % (ref 0–1.9)
BILIRUB SERPL-MCNC: 1.7 MG/DL (ref 0.1–1)
BNP SERPL-MCNC: 246 PG/ML (ref 0–99)
BUN SERPL-MCNC: 20 MG/DL (ref 6–20)
CALCIUM SERPL-MCNC: 9.4 MG/DL (ref 8.7–10.5)
CHLORIDE SERPL-SCNC: 97 MMOL/L (ref 95–110)
CO2 SERPL-SCNC: 27 MMOL/L (ref 23–29)
CREAT SERPL-MCNC: 1.3 MG/DL (ref 0.5–1.4)
DIFFERENTIAL METHOD BLD: ABNORMAL
EOSINOPHIL # BLD AUTO: 0.2 K/UL (ref 0–0.5)
EOSINOPHIL NFR BLD: 2.2 % (ref 0–8)
ERYTHROCYTE [DISTWIDTH] IN BLOOD BY AUTOMATED COUNT: 25.2 % (ref 11.5–14.5)
EST. GFR  (NO RACE VARIABLE): >60 ML/MIN/1.73 M^2
GLUCOSE SERPL-MCNC: 213 MG/DL (ref 70–110)
HCT VFR BLD AUTO: 28.6 % (ref 40–54)
HGB BLD-MCNC: 7.8 G/DL (ref 14–18)
IMM GRANULOCYTES # BLD AUTO: 0.01 K/UL (ref 0–0.04)
IMM GRANULOCYTES NFR BLD AUTO: 0.1 % (ref 0–0.5)
INR PPP: 1.5 (ref 0.8–1.2)
LDH SERPL L TO P-CCNC: 278 U/L (ref 110–260)
LYMPHOCYTES # BLD AUTO: 1.7 K/UL (ref 1–4.8)
LYMPHOCYTES NFR BLD: 20.6 % (ref 18–48)
MAGNESIUM SERPL-MCNC: 1.8 MG/DL (ref 1.6–2.6)
MCH RBC QN AUTO: 17 PG (ref 27–31)
MCHC RBC AUTO-ENTMCNC: 27.3 G/DL (ref 32–36)
MCV RBC AUTO: 62 FL (ref 82–98)
MONOCYTES # BLD AUTO: 0.8 K/UL (ref 0.3–1)
MONOCYTES NFR BLD: 9.7 % (ref 4–15)
NEUTROPHILS # BLD AUTO: 5.4 K/UL (ref 1.8–7.7)
NEUTROPHILS NFR BLD: 66.4 % (ref 38–73)
NRBC BLD-RTO: 0 /100 WBC
PLATELET # BLD AUTO: 335 K/UL (ref 150–450)
PMV BLD AUTO: 9.5 FL (ref 9.2–12.9)
POCT GLUCOSE: 117 MG/DL (ref 70–110)
POCT GLUCOSE: 171 MG/DL (ref 70–110)
POCT GLUCOSE: 192 MG/DL (ref 70–110)
POCT GLUCOSE: 93 MG/DL (ref 70–110)
POTASSIUM SERPL-SCNC: 3.7 MMOL/L (ref 3.5–5.1)
PROT SERPL-MCNC: 9 G/DL (ref 6–8.4)
PROTHROMBIN TIME: 15.6 SEC (ref 9–12.5)
RBC # BLD AUTO: 4.58 M/UL (ref 4.6–6.2)
SODIUM SERPL-SCNC: 133 MMOL/L (ref 136–145)
WBC # BLD AUTO: 8.12 K/UL (ref 3.9–12.7)

## 2024-05-24 PROCEDURE — 80053 COMPREHEN METABOLIC PANEL: CPT | Performed by: STUDENT IN AN ORGANIZED HEALTH CARE EDUCATION/TRAINING PROGRAM

## 2024-05-24 PROCEDURE — 25000003 PHARM REV CODE 250: Performed by: STUDENT IN AN ORGANIZED HEALTH CARE EDUCATION/TRAINING PROGRAM

## 2024-05-24 PROCEDURE — A4216 STERILE WATER/SALINE, 10 ML: HCPCS | Performed by: INTERNAL MEDICINE

## 2024-05-24 PROCEDURE — 27000248 HC VAD-ADDITIONAL DAY

## 2024-05-24 PROCEDURE — 85730 THROMBOPLASTIN TIME PARTIAL: CPT | Performed by: STUDENT IN AN ORGANIZED HEALTH CARE EDUCATION/TRAINING PROGRAM

## 2024-05-24 PROCEDURE — 83735 ASSAY OF MAGNESIUM: CPT | Performed by: STUDENT IN AN ORGANIZED HEALTH CARE EDUCATION/TRAINING PROGRAM

## 2024-05-24 PROCEDURE — 63600175 PHARM REV CODE 636 W HCPCS: Performed by: STUDENT IN AN ORGANIZED HEALTH CARE EDUCATION/TRAINING PROGRAM

## 2024-05-24 PROCEDURE — 25000003 PHARM REV CODE 250: Performed by: INTERNAL MEDICINE

## 2024-05-24 PROCEDURE — 93750 INTERROGATION VAD IN PERSON: CPT | Mod: ,,, | Performed by: INTERNAL MEDICINE

## 2024-05-24 PROCEDURE — 85610 PROTHROMBIN TIME: CPT | Performed by: STUDENT IN AN ORGANIZED HEALTH CARE EDUCATION/TRAINING PROGRAM

## 2024-05-24 PROCEDURE — 20600001 HC STEP DOWN PRIVATE ROOM

## 2024-05-24 PROCEDURE — 85025 COMPLETE CBC W/AUTO DIFF WBC: CPT | Performed by: STUDENT IN AN ORGANIZED HEALTH CARE EDUCATION/TRAINING PROGRAM

## 2024-05-24 PROCEDURE — 83880 ASSAY OF NATRIURETIC PEPTIDE: CPT | Performed by: STUDENT IN AN ORGANIZED HEALTH CARE EDUCATION/TRAINING PROGRAM

## 2024-05-24 PROCEDURE — 99233 SBSQ HOSP IP/OBS HIGH 50: CPT | Mod: ,,, | Performed by: INTERNAL MEDICINE

## 2024-05-24 PROCEDURE — 83615 LACTATE (LD) (LDH) ENZYME: CPT | Performed by: STUDENT IN AN ORGANIZED HEALTH CARE EDUCATION/TRAINING PROGRAM

## 2024-05-24 PROCEDURE — 99232 SBSQ HOSP IP/OBS MODERATE 35: CPT | Mod: ,,,

## 2024-05-24 RX ORDER — POTASSIUM CHLORIDE 20 MEQ/1
40 TABLET, EXTENDED RELEASE ORAL ONCE
Status: COMPLETED | OUTPATIENT
Start: 2024-05-24 | End: 2024-05-24

## 2024-05-24 RX ORDER — MAGNESIUM SULFATE HEPTAHYDRATE 40 MG/ML
2 INJECTION, SOLUTION INTRAVENOUS ONCE
Status: COMPLETED | OUTPATIENT
Start: 2024-05-24 | End: 2024-05-24

## 2024-05-24 RX ORDER — INSULIN ASPART 100 [IU]/ML
12 INJECTION, SOLUTION INTRAVENOUS; SUBCUTANEOUS
Status: DISCONTINUED | OUTPATIENT
Start: 2024-05-24 | End: 2024-05-26

## 2024-05-24 RX ORDER — LANOLIN ALCOHOL/MO/W.PET/CERES
400 CREAM (GRAM) TOPICAL ONCE
Status: COMPLETED | OUTPATIENT
Start: 2024-05-24 | End: 2024-05-24

## 2024-05-24 RX ADMIN — MAGNESIUM SULFATE HEPTAHYDRATE 2 G: 40 INJECTION, SOLUTION INTRAVENOUS at 08:05

## 2024-05-24 RX ADMIN — INSULIN ASPART 12 UNITS: 100 INJECTION, SOLUTION INTRAVENOUS; SUBCUTANEOUS at 06:05

## 2024-05-24 RX ADMIN — INSULIN ASPART 1 UNITS: 100 INJECTION, SOLUTION INTRAVENOUS; SUBCUTANEOUS at 08:05

## 2024-05-24 RX ADMIN — MILRINONE LACTATE IN DEXTROSE 0.25 MCG/KG/MIN: 200 INJECTION, SOLUTION INTRAVENOUS at 01:05

## 2024-05-24 RX ADMIN — PANTOPRAZOLE SODIUM 40 MG: 40 TABLET, DELAYED RELEASE ORAL at 08:05

## 2024-05-24 RX ADMIN — POTASSIUM CHLORIDE 40 MEQ: 1500 TABLET, EXTENDED RELEASE ORAL at 08:05

## 2024-05-24 RX ADMIN — INSULIN ASPART 15 UNITS: 100 INJECTION, SOLUTION INTRAVENOUS; SUBCUTANEOUS at 08:05

## 2024-05-24 RX ADMIN — LEVETIRACETAM 1500 MG: 500 TABLET, FILM COATED ORAL at 08:05

## 2024-05-24 RX ADMIN — INSULIN ASPART 2 UNITS: 100 INJECTION, SOLUTION INTRAVENOUS; SUBCUTANEOUS at 08:05

## 2024-05-24 RX ADMIN — ATORVASTATIN CALCIUM 40 MG: 20 TABLET, FILM COATED ORAL at 08:05

## 2024-05-24 RX ADMIN — Medication 10 ML: at 12:05

## 2024-05-24 RX ADMIN — CEFADROXIL 500 MG: 500 CAPSULE ORAL at 08:05

## 2024-05-24 RX ADMIN — INSULIN GLARGINE 20 UNITS: 100 INJECTION, SOLUTION SUBCUTANEOUS at 08:05

## 2024-05-24 RX ADMIN — SPIRONOLACTONE 25 MG: 25 TABLET ORAL at 08:05

## 2024-05-24 RX ADMIN — INSULIN ASPART 12 UNITS: 100 INJECTION, SOLUTION INTRAVENOUS; SUBCUTANEOUS at 01:05

## 2024-05-24 RX ADMIN — WARFARIN SODIUM 6 MG: 3 TABLET ORAL at 04:05

## 2024-05-24 RX ADMIN — ASPIRIN 81 MG: 81 TABLET, COATED ORAL at 08:05

## 2024-05-24 RX ADMIN — AMLODIPINE BESYLATE 5 MG: 5 TABLET ORAL at 08:05

## 2024-05-24 RX ADMIN — Medication 400 MG: at 01:05

## 2024-05-24 RX ADMIN — Medication 400 MG: at 08:05

## 2024-05-24 RX ADMIN — MIRTAZAPINE 15 MG: 15 TABLET, FILM COATED ORAL at 08:05

## 2024-05-24 RX ADMIN — MILRINONE LACTATE IN DEXTROSE 0.25 MCG/KG/MIN: 200 INJECTION, SOLUTION INTRAVENOUS at 04:05

## 2024-05-24 NOTE — ASSESSMENT & PLAN NOTE
Procedure: Device Interrogation Including analysis of device parameters  Current Settings: Ventricular Assist Device  Review of device function is stable/unstable stable  Anticoagulation w/ coumadin. Goal INR 2-3. S/p 1mg Vit K on 5/21. Currenly subtherapeuic. Continue coumadin w/ heparin bridge.         5/24/2024     8:27 AM 5/24/2024     3:56 AM 5/23/2024    11:30 PM 5/23/2024     9:30 PM 5/23/2024     1:21 PM 5/23/2024     8:08 AM 5/23/2024     3:35 AM   TXP LVAD INTERROGATIONS   Type HeartMate3 HeartMate3 HeartMate3 HeartMate3 HeartMate3 HeartMate3    Flow 4.1 4.1 4.1 4.3 4.3 4 3.9   Speed 5100 5100 5100 5150 5100 5100 5100   PI 5.4 6.1 5.1 5.2 4.8 6.7 6.3   Power ([place]) 3.7 3.5 3.7 3.6 3.6 3.7 3.5   LSL 4700   4700 4700 4700    Pulsatility  Intermittent pulse  Intermittent pulse

## 2024-05-24 NOTE — SUBJECTIVE & OBJECTIVE
Interval History: NAEON. No acute complaints. Discharge pending INR improvement.     Continuous Infusions:   heparin (porcine) in D5W  0-40 Units/kg/hr (Adjusted) Intravenous Continuous 10.5 mL/hr at 05/23/24 1530 14 Units/kg/hr at 05/23/24 1530    milrinone 20mg/100ml D5W (200mcg/ml)  0.25 mcg/kg/min Intravenous Continuous 7.1 mL/hr at 05/24/24 0410 0.25 mcg/kg/min at 05/24/24 0410     Scheduled Meds:   amLODIPine  5 mg Oral Daily    aspirin  81 mg Oral Daily    atorvastatin  40 mg Oral Daily    cefadroxil  500 mg Oral Q12H    insulin aspart U-100  12 Units Subcutaneous TIDWM    insulin glargine U-100  20 Units Subcutaneous BID    levETIRAcetam  1,500 mg Oral BID    magnesium oxide  400 mg Oral Daily    mirtazapine  15 mg Oral Nightly    pantoprazole  40 mg Oral Daily    sodium chloride 0.9%  10 mL Intravenous Q6H    spironolactone  25 mg Oral Daily    warfarin  6 mg Oral Daily     PRN Meds:  Current Facility-Administered Medications:     acetaminophen, 650 mg, Oral, Q6H PRN    cyclobenzaprine, 5 mg, Oral, TID PRN    dextrose 10%, 12.5 g, Intravenous, PRN    dextrose 10%, 25 g, Intravenous, PRN    glucagon (human recombinant), 1 mg, Intramuscular, PRN    glucose, 16 g, Oral, PRN    glucose, 24 g, Oral, PRN    insulin aspart U-100, 0-10 Units, Subcutaneous, QID (AC + HS) PRN    Flushing PICC/Midline Protocol, , , Until Discontinued **AND** sodium chloride 0.9%, 10 mL, Intravenous, Q6H **AND** sodium chloride 0.9%, 10 mL, Intravenous, PRN    Review of patient's allergies indicates:   Allergen Reactions    Bumex [bumetanide] Hives    Torsemide Hives     Objective:     Vital Signs (Most Recent):  Temp: 97.7 °F (36.5 °C) (05/24/24 1149)  Pulse: (!) 118 (05/24/24 1101)  Resp: 18 (05/24/24 1149)  BP: (!) 80/0 (05/24/24 0826)  SpO2: 100 % (05/24/24 0826) Vital Signs (24h Range):  Temp:  [97.7 °F (36.5 °C)-98.3 °F (36.8 °C)] 97.7 °F (36.5 °C)  Pulse:  [] 118  Resp:  [18-20] 18  SpO2:  [96 %-100 %] 100 %  BP:  ()/(0-68) 80/0     Patient Vitals for the past 72 hrs (Last 3 readings):   Weight   05/24/24 0718 71.8 kg (158 lb 4.6 oz)   05/23/24 0745 72.2 kg (159 lb 2.8 oz)   05/22/24 1424 74.7 kg (164 lb 10.9 oz)     Body mass index is 19.78 kg/m².      Intake/Output Summary (Last 24 hours) at 5/24/2024 1157  Last data filed at 5/24/2024 1100  Gross per 24 hour   Intake 1644 ml   Output 3960 ml   Net -2316 ml       Hemodynamic Parameters:  CVP:  [13 mmHg-15 mmHg] 13 mmHg         Physical Exam  Constitutional:       General: He is not in acute distress.     Appearance: Normal appearance.   HENT:      Head: Normocephalic and atraumatic.   Eyes:      Conjunctiva/sclera: Conjunctivae normal.   Neck:      Vascular: JVD present.      Comments: JVP 13cm of H20  Cardiovascular:      Comments: VAD hum appreciated  Pulmonary:      Breath sounds: Normal breath sounds.      Comments: Clear to auscultation  Abdominal:      Comments: Soft, non-tender, non-distended   Musculoskeletal:      Cervical back: Normal range of motion.   Skin:     General: Skin is warm and dry.      Capillary Refill: Capillary refill takes less than 2 seconds.   Neurological:      General: No focal deficit present.      Mental Status: He is alert and oriented to person, place, and time.   Psychiatric:         Mood and Affect: Mood and affect normal.            Significant Labs:  CBC:  Recent Labs   Lab 05/22/24  0424 05/23/24  0342 05/24/24  0403   WBC 6.87 6.73 8.12   RBC 4.12* 4.63 4.58*   HGB 7.1* 7.8* 7.8*   HCT 25.7* 29.1* 28.6*    297 335   MCV 62* 63* 62*   MCH 17.2* 16.8* 17.0*   MCHC 27.6* 26.8* 27.3*     BNP:  Recent Labs   Lab 05/20/24  1550 05/22/24  0424 05/24/24  0403   BNP 1,168* 777* 246*     CMP:  Recent Labs   Lab 05/22/24  0424 05/22/24  1657 05/23/24  0342 05/23/24  2146 05/24/24  0403   *  --  205*  --  213*   CALCIUM 8.9  --  9.4  --  9.4   ALBUMIN 2.8*  --  3.0*  --  2.9*   PROT 8.4  --  9.1*  --  9.0*     --  133*   --  133*   K 3.4*   < > 3.8 4.0 3.7   CO2 30*  --  29  --  27   CL 99  --  96  --  97   BUN 12  --  15  --  20   CREATININE 0.9  --  1.1  --  1.3   ALKPHOS 174*  --  194*  --  210*   ALT 8*  --  13  --  17   AST 28  --  29  --  32   BILITOT 1.7*  --  1.8*  --  1.7*    < > = values in this interval not displayed.      Coagulation:   Recent Labs   Lab 05/22/24  0424 05/23/24  0342 05/23/24  0842 05/23/24  1413 05/23/24  2143 05/24/24  0403   INR 2.2* 1.5*  --   --   --  1.5*   APTT 33.3* 31.0   < > 37.3* 41.8* 44.2*  44.2*    < > = values in this interval not displayed.     LDH:  Recent Labs   Lab 05/22/24 0424 05/23/24  0342 05/24/24  0403   * 295* 278*     Microbiology:  Microbiology Results (last 7 days)       Procedure Component Value Units Date/Time    Aerobic culture [[id number]] Collected: 05/20/24 1653    Order Status: Completed Specimen: Wound from Abdomen Updated: 05/23/24 1053     Aerobic Bacterial Culture No growth    Narrative:      LEXX ALVAREZ    Culture, Anaerobe [[id number]] Collected: 05/20/24 1653    Order Status: Completed Specimen: Wound from Abdomen Updated: 05/22/24 0946     Anaerobic Culture Culture in progress    Narrative:      LEXX ALVAREZ    Gram stain [[id number]] Collected: 05/20/24 1653    Order Status: Completed Specimen: Wound from Abdomen Updated: 05/20/24 1957     Gram Stain Result No WBC's      No organisms seen    Narrative:      LEXX ALVAREZ            I have reviewed all pertinent labs within the past 24 hours.    Estimated Creatinine Clearance: 77.5 mL/min (based on SCr of 1.3 mg/dL).    Diagnostic Results:  I have reviewed all pertinent imaging results/findings within the past 24 hours.

## 2024-05-24 NOTE — PROGRESS NOTES
[facility] - Cardiology Stepdown  Heart Transplant  Progress Note    Patient Name: Kevan Queen  MRN: [id number]  Admission Date: 5/20/2024  Hospital Length of Stay: 4 days  Attending Physician: Luca Lopez Jr.*  Primary Care Provider: Rosio Armendariz FNP  Principal Problem:Acute on chronic combined systolic and diastolic heart failure    Subjective:   Interval History: NAEON. No acute complaints. Discharge pending INR improvement.     Continuous Infusions:   heparin (porcine) in D5W  0-40 Units/kg/hr (Adjusted) Intravenous Continuous 10.5 mL/hr at 05/23/24 1530 14 Units/kg/hr at 05/23/24 1530    milrinone 20mg/100ml D5W (200mcg/ml)  0.25 mcg/kg/min Intravenous Continuous 7.1 mL/hr at 05/24/24 0410 0.25 mcg/kg/min at 05/24/24 0410     Scheduled Meds:   amLODIPine  5 mg Oral Daily    aspirin  81 mg Oral Daily    atorvastatin  40 mg Oral Daily    cefadroxil  500 mg Oral Q12H    insulin aspart U-100  12 Units Subcutaneous TIDWM    insulin glargine U-100  20 Units Subcutaneous BID    levETIRAcetam  1,500 mg Oral BID    magnesium oxide  400 mg Oral Daily    mirtazapine  15 mg Oral Nightly    pantoprazole  40 mg Oral Daily    sodium chloride 0.9%  10 mL Intravenous Q6H    spironolactone  25 mg Oral Daily    warfarin  6 mg Oral Daily     PRN Meds:  Current Facility-Administered Medications:     acetaminophen, 650 mg, Oral, Q6H PRN    cyclobenzaprine, 5 mg, Oral, TID PRN    dextrose 10%, 12.5 g, Intravenous, PRN    dextrose 10%, 25 g, Intravenous, PRN    glucagon (human recombinant), 1 mg, Intramuscular, PRN    glucose, 16 g, Oral, PRN    glucose, 24 g, Oral, PRN    insulin aspart U-100, 0-10 Units, Subcutaneous, QID (AC + HS) PRN    Flushing PICC/Midline Protocol, , , Until Discontinued **AND** sodium chloride 0.9%, 10 mL, Intravenous, Q6H **AND** sodium chloride 0.9%, 10 mL, Intravenous, PRN    Review of patient's allergies indicates:   Allergen Reactions    Bumex [bumetanide] Hives    Torsemide Hives      Objective:     Vital Signs (Most Recent):  Temp: 97.7 °F (36.5 °C) (05/24/24 1149)  Pulse: (!) 118 (05/24/24 1101)  Resp: 18 (05/24/24 1149)  BP: (!) 80/0 (05/24/24 0826)  SpO2: 100 % (05/24/24 0826) Vital Signs (24h Range):  Temp:  [97.7 °F (36.5 °C)-98.3 °F (36.8 °C)] 97.7 °F (36.5 °C)  Pulse:  [] 118  Resp:  [18-20] 18  SpO2:  [96 %-100 %] 100 %  BP: ()/(0-68) 80/0     Patient Vitals for the past 72 hrs (Last 3 readings):   Weight   05/24/24 0718 71.8 kg (158 lb 4.6 oz)   05/23/24 0745 72.2 kg (159 lb 2.8 oz)   05/22/24 1424 74.7 kg (164 lb 10.9 oz)     Body mass index is 19.78 kg/m².      Intake/Output Summary (Last 24 hours) at 5/24/2024 1157  Last data filed at 5/24/2024 1100  Gross per 24 hour   Intake 1644 ml   Output 3960 ml   Net -2316 ml       Hemodynamic Parameters:  CVP:  [13 mmHg-15 mmHg] 13 mmHg         Physical Exam  Constitutional:       General: He is not in acute distress.     Appearance: Normal appearance.   HENT:      Head: Normocephalic and atraumatic.   Eyes:      Conjunctiva/sclera: Conjunctivae normal.   Neck:      Vascular: JVD present.      Comments: JVP 13cm of H20  Cardiovascular:      Comments: VAD hum appreciated  Pulmonary:      Breath sounds: Normal breath sounds.      Comments: Clear to auscultation  Abdominal:      Comments: Soft, non-tender, non-distended   Musculoskeletal:      Cervical back: Normal range of motion.   Skin:     General: Skin is warm and dry.      Capillary Refill: Capillary refill takes less than 2 seconds.   Neurological:      General: No focal deficit present.      Mental Status: He is alert and oriented to person, place, and time.   Psychiatric:         Mood and Affect: Mood and affect normal.            Significant Labs:  CBC:  Recent Labs   Lab 05/22/24  0424 05/23/24  0342 05/24/24  0403   WBC 6.87 6.73 8.12   RBC 4.12* 4.63 4.58*   HGB 7.1* 7.8* 7.8*   HCT 25.7* 29.1* 28.6*    297 335   MCV 62* 63* 62*   MCH 17.2* 16.8* 17.0*   MCHC  27.6* 26.8* 27.3*     BNP:  Recent Labs   Lab 05/20/24  1550 05/22/24  0424 05/24/24  0403   BNP 1,168* 777* 246*     CMP:  Recent Labs   Lab 05/22/24  0424 05/22/24  1657 05/23/24  0342 05/23/24  2146 05/24/24  0403   *  --  205*  --  213*   CALCIUM 8.9  --  9.4  --  9.4   ALBUMIN 2.8*  --  3.0*  --  2.9*   PROT 8.4  --  9.1*  --  9.0*     --  133*  --  133*   K 3.4*   < > 3.8 4.0 3.7   CO2 30*  --  29  --  27   CL 99  --  96  --  97   BUN 12  --  15  --  20   CREATININE 0.9  --  1.1  --  1.3   ALKPHOS 174*  --  194*  --  210*   ALT 8*  --  13  --  17   AST 28  --  29  --  32   BILITOT 1.7*  --  1.8*  --  1.7*    < > = values in this interval not displayed.      Coagulation:   Recent Labs   Lab 05/22/24  0424 05/23/24  0342 05/23/24  0842 05/23/24  1413 05/23/24 2143 05/24/24  0403   INR 2.2* 1.5*  --   --   --  1.5*   APTT 33.3* 31.0   < > 37.3* 41.8* 44.2*  44.2*    < > = values in this interval not displayed.     LDH:  Recent Labs   Lab 05/22/24 0424 05/23/24  0342 05/24/24  0403   * 295* 278*     Microbiology:  Microbiology Results (last 7 days)       Procedure Component Value Units Date/Time    Aerobic culture [[id number]] Collected: 05/20/24 1653    Order Status: Completed Specimen: Wound from Abdomen Updated: 05/23/24 1053     Aerobic Bacterial Culture No growth    Narrative:      VAD DLES    Culture, Anaerobe [[id number]] Collected: 05/20/24 1653    Order Status: Completed Specimen: Wound from Abdomen Updated: 05/22/24 0946     Anaerobic Culture Culture in progress    Narrative:      LEXX ALVAREZ    Gram stain [[id number]] Collected: 05/20/24 1653    Order Status: Completed Specimen: Wound from Abdomen Updated: 05/20/24 1957     Gram Stain Result No WBC's      No organisms seen    Narrative:      LEXX ALVAREZ            I have reviewed all pertinent labs within the past 24 hours.    Estimated Creatinine Clearance: 77.5 mL/min (based on SCr of 1.3 mg/dL).    Diagnostic Results:  I have  reviewed all pertinent imaging results/findings within the past 24 hours.  Assessment and Plan:     Mr. 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 He underwent RVAD removal and chest closure 6/30/2022.  He also completed a course of IV Abx for staph epi. He was weaned off  but he had to restarted due to RVF. He was eventually transitioned to milrinone (secondary to  shortage) now on 0.25 mcg/kg/min. He has a history of unclear syncope vs seizures and is followed by neuro for this and is on Keppra.       On 11/15/23 underwent removal of eroded [employer] scICD--no Lifevest (due to LVAD) and no plan to replace ICD as not requiring therapy post LVAD with concern for reinfection.  He has not had neurology f/u with seizure hx on keppra so coordinator to assist him with obtaining appt.    Patient presented today for routine HTS clinic appointment but was found to hunched over in a chair w/ marked dyspnea. Patient sent directly to the ED for further evaluation. States Picc line stopped working last night. Patient currently endorses chronic SOB w.exertion, but denies any SOB at rest, orthopnea, chest pain, fever, chills, nausea, vomiting, abdominal pain, abd distention, PND. States instead of take Lasix 80mg daily, he was taking Lasix 40mg bid.     Of note, patient was most recently hospitalized from 4/20-4/26 for ADHF and hyperglycemia. patient noted to be hyperglycemia w/ glucose>500 and was started on insulin drip and then switched to basal bolus dosing as per Endocrinology once hyperglycemia resolved. Patient was also started on IV lasix for ADHF. Patient throughout the hospital course had difficulty taking lasix due to extreme  cramping not related to electrolyte de-arrangement. Discussion was had with patient on whether he would rather switch to hospice care patient stated he would rather live with the discomfort associated w/ lasix. At the time of discharge patient was maintaining euvolemia w/ lasix PO 80mg daily.    Home Medications:   amlodipine besylate 5 mg Oral Daily  aspirin 81 mg Oral Daily  atorvastatin calcium 40 mg Oral Daily  cefadroxil 500 mg Oral Every 12 hours  cyclobenzaprine HCl 5 mg Oral 3 times daily PRN  furosemide 80 mg Oral Daily  Insulin detemir 20 units bid  Insulin aspart 18units tid  Keppra 1500mg bid  magnesium oxide 400 mg Oral Daily  milrinone lactate,milrinone lactate/D5W 0.25 mcg/kg/min (23.6 mcg/min) Intravenous Continuous  mirtazapine 15 mg Oral Nightly  pantoprazole sodium 40 mg Oral Daily  potassium chloride 10 MEQ 30 mEq Oral Daily  spironolactone 25 mg Oral Daily  warfarin sodium 3 MG Take 1.5 tablets (4.5mg) orally daily, except 1 tablet (3mg) on Sundays and Fridays        Cardiac Imaging:   Echo (9/2/2023): HM3 5100. AV does not open. IV septum midline. LV sevewrely dilated w/ normal ventricular mass, normal wall thickness, normal wall motion, severely reduced systolic function w/ EF 10-15%, and normal diastolic function. RV normal cavity size w/ normal wall thickness, normal wall motion, and normal systolic function. Mild MR. Mod to severe TR. PA sys pressure 38mmHg. LVIDd 7.11cm.     RHC (10/31/2022): RA 18, PWP 21, CO 3.55, CI 1.6. On mil 0.125 and HM3 5100.     * Acute on chronic combined systolic and diastolic heart failure  -NICM s/p HM 3 on home Milrinone   -Last 2D Echo  9/5/23 : LVEF 10-15%, LVEDD  7.11 cm with speed at 5100  -Last RHC: 10/31/22 with speed at 5100 on Milrinone 0.125 RA: 18, RV: 35/7 (22), PAP: 35/19 (24), PWP: 21, CO: 3.55, CI: 1.6  -JVP remains at 13cm however with his rising creatinine and RVF this might be adequate for him. Will discontinue IV lasix and hold PO for now.    -Continue home milrinone 0.25  -GDMT with N/A  -2g Na dietary restriction, 1500 mL fluid restriction, strict I/Os    LVAD (left ventricular assist device) present  Procedure: Device Interrogation Including analysis of device parameters  Current Settings: Ventricular Assist Device  Review of device function is stable/unstable stable  Anticoagulation w/ coumadin. Goal INR 2-3. S/p 1mg Vit K on 5/21. Currenly subtherapeuic. Continue coumadin w/ heparin bridge.         5/24/2024     8:27 AM 5/24/2024     3:56 AM 5/23/2024    11:30 PM 5/23/2024     9:30 PM 5/23/2024     1:21 PM 5/23/2024     8:08 AM 5/23/2024     3:35 AM   TXP LVAD INTERROGATIONS   Type HeartMate3 HeartMate3 HeartMate3 HeartMate3 HeartMate3 HeartMate3    Flow 4.1 4.1 4.1 4.3 4.3 4 3.9   Speed 5100 5100 5100 5150 5100 5100 5100   PI 5.4 6.1 5.1 5.2 4.8 6.7 6.3   Power ([place]) 3.7 3.5 3.7 3.6 3.6 3.7 3.5   LSL 4700   4700 4700 4700    Pulsatility  Intermittent pulse  Intermittent pulse            Seizure-like activity  Continue home keppra.     Type 2 diabetes mellitus with hyperglycemia, with long-term current use of insulin  Management as per Endocrinology.     HTN (hypertension)  - continue amlodopine.         Chantal Herndon MD  Heart Transplant  [facility] - Cardiology Stepdown

## 2024-05-24 NOTE — NURSING
Nurses Note -- 4 Eyes      5/24/2024   9:57 AM      Skin assessed during: Q Shift Change      [x] No Altered Skin Integrity Present    []Prevention Measures Documented      [] Yes- Altered Skin Integrity Present or Discovered   [] LDA Added if Not in Epic (Describe Wound)   [] New Altered Skin Integrity was Present on Admit and Documented in LDA   [] Wound Image Taken    Wound Care Consulted? No    Attending Nurse:  Ana Mullen RN/Staff Member:   Julianne

## 2024-05-24 NOTE — PROGRESS NOTES
[facility] - [facility] Step[facility]  Cardiothoracic Surgery  Evaluation and Management/VAD interrogation       Patient Name: Kevan Queen  MRN: [id number]  Admission Date: 5/20/2024  Hospital Length of Stay: 4 days  Code Status: Full Code   Attending Physician: Luca Lopez Jr.*   Referring Provider: Self, Aaareferral  Principal Problem:Acute on chronic combined systolic and diastolic heart failure      Subjective:     Post-Op Info:  * No surgery found *           Interval History: NAEON. Resting in bed. CVP 13. INR 1.5.         Medications:  Continuous Infusions:   heparin (porcine) in D5W  0-40 Units/kg/hr (Adjusted) Intravenous Continuous 10.5 mL/hr at 05/23/24 1530 14 Units/kg/hr at 05/23/24 1530    milrinone 20mg/100ml D5W (200mcg/ml)  0.25 mcg/kg/min Intravenous Continuous 7.1 mL/hr at 05/24/24 0410 0.25 mcg/kg/min at 05/24/24 0410     Scheduled Meds:   amLODIPine  5 mg Oral Daily    aspirin  81 mg Oral Daily    atorvastatin  40 mg Oral Daily    cefadroxil  500 mg Oral Q12H    insulin aspart U-100  15 Units Subcutaneous TIDWM    insulin glargine U-100  20 Units Subcutaneous BID    levETIRAcetam  1,500 mg Oral BID    magnesium oxide  400 mg Oral Daily    magnesium sulfate IVPB  2 g Intravenous Once    mirtazapine  15 mg Oral Nightly    pantoprazole  40 mg Oral Daily    sodium chloride 0.9%  10 mL Intravenous Q6H    spironolactone  25 mg Oral Daily    warfarin  6 mg Oral Daily     PRN Meds:  Current Facility-Administered Medications:     acetaminophen, 650 mg, Oral, Q6H PRN    cyclobenzaprine, 5 mg, Oral, TID PRN    dextrose 10%, 12.5 g, Intravenous, PRN    dextrose 10%, 25 g, Intravenous, PRN    glucagon (human recombinant), 1 mg, Intramuscular, PRN    glucose, 16 g, Oral, PRN    glucose, 24 g, Oral, PRN    insulin aspart U-100, 0-10 Units, Subcutaneous, QID (AC + HS) PRN    Flushing PICC/Midline Protocol, , , Until Discontinued **AND** sodium chloride 0.9%, 10 mL, Intravenous, Q6H **AND** sodium  "chloride 0.9%, 10 mL, Intravenous, PRN     Objective:     Vital Signs (Most Recent):  Temp: 98.1 °F (36.7 °C) (05/24/24 0826)  Pulse: (!) 114 (05/24/24 0826)  Resp: 18 (05/24/24 0826)  BP: (!) 80/0 (05/24/24 0826)  SpO2: 100 % (05/24/24 0826) Vital Signs (24h Range):  Temp:  [97.1 °F (36.2 °C)-98.3 °F (36.8 °C)] 98.1 °F (36.7 °C)  Pulse:  [] 114  Resp:  [18-20] 18  SpO2:  [96 %-100 %] 100 %  BP: ()/(0-68) 80/0       Intake/Output Summary (Last 24 hours) at 5/24/2024 1000  Last data filed at 5/24/2024 0938  Gross per 24 hour   Intake 1644 ml   Output 3385 ml   Net -1741 ml       Physical Exam  Constitutional:       General: He is not in acute distress.  Cardiovascular:      Comments: VAD  Pulmonary:      Effort: No respiratory distress.   Skin:     Coloration: Skin is not pale.         Significant Labs:  ABGs: No results for input(s): "PH", "PCO2", "PO2", "HCO3", "POCSATURATED", "BE" in the last 48 hours.  Amylase: No results for input(s): "AMYLASE" in the last 48 hours.  BMP:   Recent Labs   Lab 05/24/24  0403   *   *   K 3.7   CL 97   CO2 27   BUN 20   CREATININE 1.3   CALCIUM 9.4   MG 1.8     Cardiac markers: No results for input(s): "CKMB", "CPKMB", "TROPONINT", "TROPONINI", "MYOGLOBIN" in the last 48 hours.  CBC:   Recent Labs   Lab 05/24/24  0403   WBC 8.12   RBC 4.58*   HGB 7.8*   HCT 28.6*      MCV 62*   MCH 17.0*   MCHC 27.3*     CMP:   Recent Labs   Lab 05/24/24  0403   *   CALCIUM 9.4   ALBUMIN 2.9*   PROT 9.0*   *   K 3.7   CO2 27   CL 97   BUN 20   CREATININE 1.3   ALKPHOS 210*   ALT 17   AST 32   BILITOT 1.7*     Coagulation:   Recent Labs   Lab 05/24/24  0403   INR 1.5*   APTT 44.2*  44.2*     Lactic Acid: No results for input(s): "LACTATE" in the last 48 hours.  LFTs:   Recent Labs   Lab 05/24/24 0403   ALT 17   AST 32   ALKPHOS 210*   BILITOT 1.7*   PROT 9.0*   ALBUMIN 2.9*     Lipase: No results for input(s): "LIPASE" in the last 48 " hours.    Significant Diagnostics:  I have reviewed all pertinent imaging results/findings within the past 24 hours.    Procedure: Device Interrogation Including analysis of device parameters  Current Settings: Ventricular Assist Device  Review of device function is stable      5/24/2024     8:27 AM 5/24/2024     3:56 AM 5/23/2024    11:30 PM 5/23/2024     9:30 PM 5/23/2024     1:21 PM 5/23/2024     8:08 AM 5/23/2024     3:35 AM   TXP LVAD INTERROGATIONS   Type HeartMate3 HeartMate3 HeartMate3 HeartMate3 HeartMate3 HeartMate3    Flow 4.1 4.1 4.1 4.3 4.3 4 3.9   Speed 5100 5100 5100 5150 5100 5100 5100   PI 5.4 6.1 5.1 5.2 4.8 6.7 6.3   Power (Serna) 3.7 3.5 3.7 3.6 3.6 3.7 3.5   LSL 4700   4700 4700 4700    Pulsatility  Intermittent pulse  Intermittent pulse          Assessment/Plan:     LVAD (left ventricular assist device) present  - Interval History was obtained from team member  during rounding today.  - VAD/ANABELLE teaching was performed with patient.  - Mobilization/Physical Therapy is ongoing.  - Anticoagulation ongoing INR 1.5. On heparin gtt   - Admitted with volume overload and malfunctioning PICC   - PICC replaced with some bleeding from site   - WBC 8.1  - H/H 7.8/28  - BUN/Creatinine 20/1.3  -   - Net negative 2.6L / made 4.2L    - Remains on milrinone     More than 50 percent of the care dominated counseling and coordinating care with different team members. The VAD was interrogated and any significant findings noted above.         Rajani Jackson PA-C  Cardiothoracic Surgery  [facility]

## 2024-05-24 NOTE — CARE UPDATE
-Glucose Goal 140-180    -A1C:   Hemoglobin A1C   Date Value Ref Range Status   04/26/2024 >14.0 (H) 4.0 - 5.6 % Final     Comment:     ADA Screening Guidelines:  5.7-6.4%  Consistent with prediabetes  >or=6.5%  Consistent with diabetes    High levels of fetal hemoglobin interfere with the HbA1C  assay. Heterozygous hemoglobin variants (HbS, HgC, etc)do  not significantly interfere with this assay.   However, presence of multiple variants may affect accuracy.           -HOME REGIMEN: -Levemir 20 units BID   -Novolog 18 units TIDWM in addition to the following correction scale:    150 - 200 + 2 unit    201 - 250 + 4 units    251 - 300 + 6 units    301 - 350 + 8 units       > 350   + 10 units    -GLUCOSE TREND FOR THE PAST 24HRS:   Recent Labs   Lab 05/22/24  2059 05/23/24  0816 05/23/24  1257 05/23/24  1627 05/23/24  1909 05/24/24  0817   POCTGLUCOSE 231* 257* 287* 135* 137* 171*         -NO HYPOGYCEMIAS NOTED     - Diet  Diet diabetic 2000 Calorie; Fluid - 1500mL    -TOLERATING 100 % OF PO DIET       Plan:   BG goal 140-180     - Lantus (Insulin Glargine) 20 units BID (20% increase due to fasting blood glucose above goal)  - Novolog (Insulin Aspart) 12 units TIDWM and prn for BG excursions MDC SSI (150/25) (reduced based on decreased prandial needs yesterday)  - Hypoglycemia protocol in place        ** Please notify Endocrine for any change and/or advance in diet**  ** Please call Endocrine for any BG related issues **     Discharge Planning:   TBD. Please notify endocrinology prior to discharge.

## 2024-05-24 NOTE — PROGRESS NOTES
05/24/2024  Enrique NANDO Awan Jr    Current provider:  Luca Lopez Jr.*    Device interrogation:      5/24/2024     3:56 AM 5/23/2024    11:30 PM 5/23/2024     9:30 PM 5/23/2024     1:21 PM 5/23/2024     8:08 AM 5/23/2024     3:35 AM 5/22/2024    11:51 PM   TXP LVAD INTERROGATIONS   Type HeartMate3 HeartMate3 HeartMate3 HeartMate3 HeartMate3     Flow 4.1 4.1 4.3 4.3 4 3.9 4.3   Speed 5100 5100 5150 5100 5100 5100 5100   PI 6.1 5.1 5.2 4.8 6.7 6.3 5.2   Power (Serna) 3.5 3.7 3.6 3.6 3.7 3.5 3.6   LSL   4700 4700 4700  4700   Pulsatility Intermittent pulse  Intermittent pulse    No Pulse          Rounded on Kevan Queen to ensure all mechanical assist device settings (IABP or VAD) were appropriate and all parameters were within limits.  I was able to ensure all back up equipment was present, the staff had no issues, and the Perfusion Department daily rounding was complete.      For implantable VADs: Interrogation of Ventricular assist device was performed with analysis of device parameters and review of device function. I have personally reviewed the interrogation findings and agree with findings as stated.     In emergency, the nursing units have been notified to contact the perfusion department either by:  Calling [phone] from 630am to 4pm Mon thru Fri, utilizing the On-Call Finder functionality of Epic and searching for Perfusion, or by contacting the hospital  from 4pm to 630am and on weekends and asking to speak with the perfusionist on call.    8:20 AM

## 2024-05-24 NOTE — ASSESSMENT & PLAN NOTE
-NICM s/p HM 3 on home Milrinone   -Last 2D Echo  9/5/23 : LVEF 10-15%, LVEDD  7.11 cm with speed at 5100  -Last RHC: 10/31/22 with speed at 5100 on Milrinone 0.125 RA: 18, RV: 35/7 (22), PAP: 35/19 (24), PWP: 21, CO: 3.55, CI: 1.6  -JVP remains at 13cm however with his rising creatinine and RVF this might be adequate for him. Will discontinue IV lasix and hold PO for now.   -Continue home milrinone 0.25  -GDMT with N/A  -2g Na dietary restriction, 1500 mL fluid restriction, strict I/Os

## 2024-05-24 NOTE — PLAN OF CARE
Plan of care discussed with patient.  Patient ambulating independently, fall precautions in place. LVAD DP and numbers WNL, smooth LVAD hum. Patient has no complaints of pain. Milrinone, and Heparin gtts maintained. Mg=1.8, replaced with 2g IVPB. Patient has no questions at this time. Will continue to monitor.

## 2024-05-24 NOTE — NURSING
Nurses Note -- 4 Eyes      5/24/2024   9:54 AM      Skin assessed during: Q Shift Change      [x] No Altered Skin Integrity Present    []Prevention Measures Documented      [] Yes- Altered Skin Integrity Present or Discovered   [] LDA Added if Not in Epic (Describe Wound)   [] New Altered Skin Integrity was Present on Admit and Documented in LDA   [] Wound Image Taken    Wound Care Consulted? No    Attending Nurse:  Ana Mullen RN/Staff Member:   Julianne

## 2024-05-24 NOTE — PROGRESS NOTES
Discharge    Pt presents in room aaox4 with open affect. Caregiver not present at this time. Pt voices agreement with plan to discharge to home tomorrow. SW informed [employer], [phone] of orders and discharge. Pt states his SO Page will be coming today to drive him home tomorrow. Pt reports coping well and denies further needs, questions, concerns at this time and none indicated. Providing psychosocial and counseling support, education, resources, assistance and discharge planning as indicated. SW remains available.

## 2024-05-24 NOTE — ASSESSMENT & PLAN NOTE
- Interval History was obtained from team member  during rounding today.  - VAD/ANABELLE teaching was performed with patient.  - Mobilization/Physical Therapy is ongoing.  - Anticoagulation ongoing INR 1.5. On heparin gtt   - Admitted with volume overload and malfunctioning PICC   - PICC replaced with some bleeding from site   - WBC 8.1  - H/H 7.8/28  - BUN/Creatinine 20/1.3  -   - Net negative 2.6L / made 4.2L    - Remains on milrinone     More than 50 percent of the care dominated counseling and coordinating care with different team members. The VAD was interrogated and any significant findings noted above.

## 2024-05-24 NOTE — SUBJECTIVE & OBJECTIVE
Interval History: NAEON. Resting in bed. CVP 15. INR 1.5.         Medications:  Continuous Infusions:   heparin (porcine) in D5W  0-40 Units/kg/hr (Adjusted) Intravenous Continuous 10.5 mL/hr at 05/23/24 1530 14 Units/kg/hr at 05/23/24 1530    milrinone 20mg/100ml D5W (200mcg/ml)  0.25 mcg/kg/min Intravenous Continuous 7.1 mL/hr at 05/24/24 0410 0.25 mcg/kg/min at 05/24/24 0410     Scheduled Meds:   amLODIPine  5 mg Oral Daily    aspirin  81 mg Oral Daily    atorvastatin  40 mg Oral Daily    cefadroxil  500 mg Oral Q12H    insulin aspart U-100  15 Units Subcutaneous TIDWM    insulin glargine U-100  20 Units Subcutaneous BID    levETIRAcetam  1,500 mg Oral BID    magnesium oxide  400 mg Oral Daily    magnesium sulfate IVPB  2 g Intravenous Once    mirtazapine  15 mg Oral Nightly    pantoprazole  40 mg Oral Daily    sodium chloride 0.9%  10 mL Intravenous Q6H    spironolactone  25 mg Oral Daily    warfarin  6 mg Oral Daily     PRN Meds:  Current Facility-Administered Medications:     acetaminophen, 650 mg, Oral, Q6H PRN    cyclobenzaprine, 5 mg, Oral, TID PRN    dextrose 10%, 12.5 g, Intravenous, PRN    dextrose 10%, 25 g, Intravenous, PRN    glucagon (human recombinant), 1 mg, Intramuscular, PRN    glucose, 16 g, Oral, PRN    glucose, 24 g, Oral, PRN    insulin aspart U-100, 0-10 Units, Subcutaneous, QID (AC + HS) PRN    Flushing PICC/Midline Protocol, , , Until Discontinued **AND** sodium chloride 0.9%, 10 mL, Intravenous, Q6H **AND** sodium chloride 0.9%, 10 mL, Intravenous, PRN     Objective:     Vital Signs (Most Recent):  Temp: 98.1 °F (36.7 °C) (05/24/24 0826)  Pulse: (!) 114 (05/24/24 0826)  Resp: 18 (05/24/24 0826)  BP: (!) 80/0 (05/24/24 0826)  SpO2: 100 % (05/24/24 0826) Vital Signs (24h Range):  Temp:  [97.1 °F (36.2 °C)-98.3 °F (36.8 °C)] 98.1 °F (36.7 °C)  Pulse:  [] 114  Resp:  [18-20] 18  SpO2:  [96 %-100 %] 100 %  BP: ()/(0-68) 80/0       Intake/Output Summary (Last 24 hours) at  "5/24/2024 1000  Last data filed at 5/24/2024 0938  Gross per 24 hour   Intake 1644 ml   Output 3385 ml   Net -1741 ml       Physical Exam  Constitutional:       General: He is not in acute distress.  Cardiovascular:      Comments: VAD  Pulmonary:      Effort: No respiratory distress.   Skin:     Coloration: Skin is not pale.         Significant Labs:  ABGs: No results for input(s): "PH", "PCO2", "PO2", "HCO3", "POCSATURATED", "BE" in the last 48 hours.  Amylase: No results for input(s): "AMYLASE" in the last 48 hours.  BMP:   Recent Labs   Lab 05/24/24  0403   *   *   K 3.7   CL 97   CO2 27   BUN 20   CREATININE 1.3   CALCIUM 9.4   MG 1.8     Cardiac markers: No results for input(s): "CKMB", "CPKMB", "TROPONINT", "TROPONINI", "MYOGLOBIN" in the last 48 hours.  CBC:   Recent Labs   Lab 05/24/24  0403   WBC 8.12   RBC 4.58*   HGB 7.8*   HCT 28.6*      MCV 62*   MCH 17.0*   MCHC 27.3*     CMP:   Recent Labs   Lab 05/24/24  0403   *   CALCIUM 9.4   ALBUMIN 2.9*   PROT 9.0*   *   K 3.7   CO2 27   CL 97   BUN 20   CREATININE 1.3   ALKPHOS 210*   ALT 17   AST 32   BILITOT 1.7*     Coagulation:   Recent Labs   Lab 05/24/24  0403   INR 1.5*   APTT 44.2*  44.2*     Lactic Acid: No results for input(s): "LACTATE" in the last 48 hours.  LFTs:   Recent Labs   Lab 05/24/24  0403   ALT 17   AST 32   ALKPHOS 210*   BILITOT 1.7*   PROT 9.0*   ALBUMIN 2.9*     Lipase: No results for input(s): "LIPASE" in the last 48 hours.    Significant Diagnostics:  I have reviewed all pertinent imaging results/findings within the past 24 hours.    Procedure: Device Interrogation Including analysis of device parameters  Current Settings: Ventricular Assist Device  Review of device function is stable      5/24/2024     8:27 AM 5/24/2024     3:56 AM 5/23/2024    11:30 PM 5/23/2024     9:30 PM 5/23/2024     1:21 PM 5/23/2024     8:08 AM 5/23/2024     3:35 AM   TXP LVAD INTERROGATIONS   Type HeartMate3 HeartMate3 " HeartMate3 HeartMate3 HeartMate3 HeartMate3    Flow 4.1 4.1 4.1 4.3 4.3 4 3.9   Speed 5100 5100 5100 5150 5100 5100 5100   PI 5.4 6.1 5.1 5.2 4.8 6.7 6.3   Power (Serna) 3.7 3.5 3.7 3.6 3.6 3.7 3.5   LSL 4700   4700 4700 4700    Pulsatility  Intermittent pulse  Intermittent pulse

## 2024-05-25 LAB
ALBUMIN SERPL BCP-MCNC: 2.8 G/DL (ref 3.5–5.2)
ALP SERPL-CCNC: 180 U/L (ref 55–135)
ALT SERPL W/O P-5'-P-CCNC: 15 U/L (ref 10–44)
ANION GAP SERPL CALC-SCNC: 9 MMOL/L (ref 8–16)
APTT PPP: 43.5 SEC (ref 21–32)
AST SERPL-CCNC: 28 U/L (ref 10–40)
BASOPHILS # BLD AUTO: 0.07 K/UL (ref 0–0.2)
BASOPHILS NFR BLD: 1 % (ref 0–1.9)
BILIRUB SERPL-MCNC: 1.4 MG/DL (ref 0.1–1)
BUN SERPL-MCNC: 22 MG/DL (ref 6–20)
CALCIUM SERPL-MCNC: 9.2 MG/DL (ref 8.7–10.5)
CHLORIDE SERPL-SCNC: 100 MMOL/L (ref 95–110)
CO2 SERPL-SCNC: 22 MMOL/L (ref 23–29)
CREAT SERPL-MCNC: 1.1 MG/DL (ref 0.5–1.4)
DIFFERENTIAL METHOD BLD: ABNORMAL
EOSINOPHIL # BLD AUTO: 0.2 K/UL (ref 0–0.5)
EOSINOPHIL NFR BLD: 2.2 % (ref 0–8)
ERYTHROCYTE [DISTWIDTH] IN BLOOD BY AUTOMATED COUNT: 25.2 % (ref 11.5–14.5)
EST. GFR  (NO RACE VARIABLE): >60 ML/MIN/1.73 M^2
GLUCOSE SERPL-MCNC: 198 MG/DL (ref 70–110)
HCT VFR BLD AUTO: 27 % (ref 40–54)
HGB BLD-MCNC: 7.3 G/DL (ref 14–18)
IMM GRANULOCYTES # BLD AUTO: 0.02 K/UL (ref 0–0.04)
IMM GRANULOCYTES NFR BLD AUTO: 0.3 % (ref 0–0.5)
INR PPP: 1.5 (ref 0.8–1.2)
LDH SERPL L TO P-CCNC: 268 U/L (ref 110–260)
LYMPHOCYTES # BLD AUTO: 1.6 K/UL (ref 1–4.8)
LYMPHOCYTES NFR BLD: 21.9 % (ref 18–48)
MAGNESIUM SERPL-MCNC: 1.7 MG/DL (ref 1.6–2.6)
MAGNESIUM SERPL-MCNC: 1.8 MG/DL (ref 1.6–2.6)
MAGNESIUM SERPL-MCNC: 2 MG/DL (ref 1.6–2.6)
MCH RBC QN AUTO: 17.2 PG (ref 27–31)
MCHC RBC AUTO-ENTMCNC: 27 G/DL (ref 32–36)
MCV RBC AUTO: 64 FL (ref 82–98)
MONOCYTES # BLD AUTO: 0.6 K/UL (ref 0.3–1)
MONOCYTES NFR BLD: 8.3 % (ref 4–15)
NEUTROPHILS # BLD AUTO: 4.8 K/UL (ref 1.8–7.7)
NEUTROPHILS NFR BLD: 66.3 % (ref 38–73)
NRBC BLD-RTO: 0 /100 WBC
PLATELET # BLD AUTO: 356 K/UL (ref 150–450)
PMV BLD AUTO: 9.8 FL (ref 9.2–12.9)
POCT GLUCOSE: 129 MG/DL (ref 70–110)
POCT GLUCOSE: 165 MG/DL (ref 70–110)
POCT GLUCOSE: 187 MG/DL (ref 70–110)
POCT GLUCOSE: 192 MG/DL (ref 70–110)
POTASSIUM SERPL-SCNC: 3.5 MMOL/L (ref 3.5–5.1)
POTASSIUM SERPL-SCNC: 3.8 MMOL/L (ref 3.5–5.1)
POTASSIUM SERPL-SCNC: 4.1 MMOL/L (ref 3.5–5.1)
PROT SERPL-MCNC: 8.5 G/DL (ref 6–8.4)
PROTHROMBIN TIME: 16.3 SEC (ref 9–12.5)
RBC # BLD AUTO: 4.24 M/UL (ref 4.6–6.2)
SODIUM SERPL-SCNC: 131 MMOL/L (ref 136–145)
WBC # BLD AUTO: 7.25 K/UL (ref 3.9–12.7)

## 2024-05-25 PROCEDURE — 83735 ASSAY OF MAGNESIUM: CPT | Performed by: STUDENT IN AN ORGANIZED HEALTH CARE EDUCATION/TRAINING PROGRAM

## 2024-05-25 PROCEDURE — 84132 ASSAY OF SERUM POTASSIUM: CPT | Mod: 91 | Performed by: STUDENT IN AN ORGANIZED HEALTH CARE EDUCATION/TRAINING PROGRAM

## 2024-05-25 PROCEDURE — 85730 THROMBOPLASTIN TIME PARTIAL: CPT | Performed by: STUDENT IN AN ORGANIZED HEALTH CARE EDUCATION/TRAINING PROGRAM

## 2024-05-25 PROCEDURE — 85610 PROTHROMBIN TIME: CPT | Performed by: STUDENT IN AN ORGANIZED HEALTH CARE EDUCATION/TRAINING PROGRAM

## 2024-05-25 PROCEDURE — 99233 SBSQ HOSP IP/OBS HIGH 50: CPT | Mod: ,,, | Performed by: INTERNAL MEDICINE

## 2024-05-25 PROCEDURE — 83615 LACTATE (LD) (LDH) ENZYME: CPT | Performed by: STUDENT IN AN ORGANIZED HEALTH CARE EDUCATION/TRAINING PROGRAM

## 2024-05-25 PROCEDURE — 20600001 HC STEP DOWN PRIVATE ROOM

## 2024-05-25 PROCEDURE — 85025 COMPLETE CBC W/AUTO DIFF WBC: CPT | Performed by: STUDENT IN AN ORGANIZED HEALTH CARE EDUCATION/TRAINING PROGRAM

## 2024-05-25 PROCEDURE — 63600175 PHARM REV CODE 636 W HCPCS: Performed by: STUDENT IN AN ORGANIZED HEALTH CARE EDUCATION/TRAINING PROGRAM

## 2024-05-25 PROCEDURE — 25000003 PHARM REV CODE 250: Performed by: HOSPITALIST

## 2024-05-25 PROCEDURE — 25000003 PHARM REV CODE 250: Performed by: INTERNAL MEDICINE

## 2024-05-25 PROCEDURE — 93750 INTERROGATION VAD IN PERSON: CPT | Mod: ,,, | Performed by: INTERNAL MEDICINE

## 2024-05-25 PROCEDURE — 27000248 HC VAD-ADDITIONAL DAY

## 2024-05-25 PROCEDURE — 25000003 PHARM REV CODE 250: Performed by: STUDENT IN AN ORGANIZED HEALTH CARE EDUCATION/TRAINING PROGRAM

## 2024-05-25 PROCEDURE — 80053 COMPREHEN METABOLIC PANEL: CPT | Performed by: STUDENT IN AN ORGANIZED HEALTH CARE EDUCATION/TRAINING PROGRAM

## 2024-05-25 RX ORDER — FUROSEMIDE 80 MG/1
80 TABLET ORAL DAILY
Status: DISCONTINUED | OUTPATIENT
Start: 2024-05-25 | End: 2024-05-25

## 2024-05-25 RX ORDER — POTASSIUM CHLORIDE 20 MEQ/1
40 TABLET, EXTENDED RELEASE ORAL ONCE
Status: COMPLETED | OUTPATIENT
Start: 2024-05-25 | End: 2024-05-25

## 2024-05-25 RX ORDER — FUROSEMIDE 10 MG/ML
80 INJECTION INTRAMUSCULAR; INTRAVENOUS DAILY
Status: DISCONTINUED | OUTPATIENT
Start: 2024-05-25 | End: 2024-05-25

## 2024-05-25 RX ADMIN — FUROSEMIDE 80 MG: 80 TABLET ORAL at 12:05

## 2024-05-25 RX ADMIN — LEVETIRACETAM 1500 MG: 500 TABLET, FILM COATED ORAL at 08:05

## 2024-05-25 RX ADMIN — CEFADROXIL 500 MG: 500 CAPSULE ORAL at 08:05

## 2024-05-25 RX ADMIN — HEPARIN SODIUM 14 UNITS/KG/HR: 10000 INJECTION, SOLUTION INTRAVENOUS at 06:05

## 2024-05-25 RX ADMIN — MIRTAZAPINE 15 MG: 15 TABLET, FILM COATED ORAL at 08:05

## 2024-05-25 RX ADMIN — WARFARIN SODIUM 6 MG: 3 TABLET ORAL at 04:05

## 2024-05-25 RX ADMIN — Medication 200 MG: at 08:05

## 2024-05-25 RX ADMIN — INSULIN ASPART 12 UNITS: 100 INJECTION, SOLUTION INTRAVENOUS; SUBCUTANEOUS at 01:05

## 2024-05-25 RX ADMIN — INSULIN ASPART 12 UNITS: 100 INJECTION, SOLUTION INTRAVENOUS; SUBCUTANEOUS at 09:05

## 2024-05-25 RX ADMIN — INSULIN ASPART 2 UNITS: 100 INJECTION, SOLUTION INTRAVENOUS; SUBCUTANEOUS at 05:05

## 2024-05-25 RX ADMIN — MILRINONE LACTATE IN DEXTROSE 0.25 MCG/KG/MIN: 200 INJECTION, SOLUTION INTRAVENOUS at 06:05

## 2024-05-25 RX ADMIN — FUROSEMIDE 80 MG: 10 INJECTION, SOLUTION INTRAVENOUS at 08:05

## 2024-05-25 RX ADMIN — Medication 400 MG: at 08:05

## 2024-05-25 RX ADMIN — POTASSIUM CHLORIDE 40 MEQ: 1500 TABLET, EXTENDED RELEASE ORAL at 08:05

## 2024-05-25 RX ADMIN — MILRINONE LACTATE IN DEXTROSE 0.25 MCG/KG/MIN: 200 INJECTION, SOLUTION INTRAVENOUS at 11:05

## 2024-05-25 RX ADMIN — ATORVASTATIN CALCIUM 40 MG: 20 TABLET, FILM COATED ORAL at 08:05

## 2024-05-25 RX ADMIN — ASPIRIN 81 MG: 81 TABLET, COATED ORAL at 08:05

## 2024-05-25 RX ADMIN — INSULIN GLARGINE 20 UNITS: 100 INJECTION, SOLUTION SUBCUTANEOUS at 09:05

## 2024-05-25 RX ADMIN — INSULIN ASPART 12 UNITS: 100 INJECTION, SOLUTION INTRAVENOUS; SUBCUTANEOUS at 05:05

## 2024-05-25 RX ADMIN — AMLODIPINE BESYLATE 5 MG: 5 TABLET ORAL at 08:05

## 2024-05-25 RX ADMIN — INSULIN ASPART 2 UNITS: 100 INJECTION, SOLUTION INTRAVENOUS; SUBCUTANEOUS at 01:05

## 2024-05-25 RX ADMIN — SPIRONOLACTONE 25 MG: 25 TABLET ORAL at 08:05

## 2024-05-25 RX ADMIN — INSULIN ASPART 2 UNITS: 100 INJECTION, SOLUTION INTRAVENOUS; SUBCUTANEOUS at 09:05

## 2024-05-25 RX ADMIN — PANTOPRAZOLE SODIUM 40 MG: 40 TABLET, DELAYED RELEASE ORAL at 08:05

## 2024-05-25 NOTE — ASSESSMENT & PLAN NOTE
-NICM s/p HM 3 on home Milrinone   -Last 2D Echo  9/5/23 : LVEF 10-15%, LVEDD  7.11 cm with speed at 5100  -Last RHC: 10/31/22 with speed at 5100 on Milrinone 0.125 RA: 18, RV: 35/7 (22), PAP: 35/19 (24), PWP: 21, CO: 3.55, CI: 1.6  -Elevated JVP however in the setting of RHF this might be his sweetspot. Will start home regimen PO lasix 80mg daily.   -Continue home milrinone 0.25  -GDMT with N/A  -2g Na dietary restriction, 1500 mL fluid restriction, strict I/Os

## 2024-05-25 NOTE — PLAN OF CARE
Plan of care discussed with patient.  Patient ambulating independently, fall precautions in place. LVAD DP and numbers WNL, smooth LVAD hum. Patient has no complaints of pain. Milrinone and heparin gtts maintained. INR=1.5. IVP lasix 80 mg administered once and to transition to PO. Patient has no questions at this time. Will continue to monitor.

## 2024-05-25 NOTE — NURSING
Nurses Note -- 4 Eyes      5/25/2024   10:12 AM      Skin assessed during: Q Shift Change      [x] No Altered Skin Integrity Present    []Prevention Measures Documented      [] Yes- Altered Skin Integrity Present or Discovered   [] LDA Added if Not in Epic (Describe Wound)   [] New Altered Skin Integrity was Present on Admit and Documented in LDA   [] Wound Image Taken    Wound Care Consulted? No    Attending Nurse:  Ana Mullen RN/Staff Member:   Julianne

## 2024-05-25 NOTE — ASSESSMENT & PLAN NOTE
Procedure: Device Interrogation Including analysis of device parameters  Current Settings: Ventricular Assist Device  Review of device function is stable/unstable stable  Anticoagulation w/ coumadin. Goal INR 2-3. S/p 1mg Vit K on 5/21. Currenly subtherapeuic. Continue coumadin w/ heparin bridge.         5/25/2024    12:22 PM 5/25/2024     8:29 AM 5/25/2024     4:15 AM 5/25/2024    12:32 AM 5/24/2024     8:30 PM 5/24/2024     4:30 PM 5/24/2024     1:15 PM   TXP LVAD INTERROGATIONS   Type HeartMate3 HeartMate3 HeartMate3 HeartMate3 HeartMate3 HeartMate3 HeartMate3   Flow 4.4 4.1 4.3 4 4.2 4.1 4.1   Speed 5100 5100 5100 5100 5100 5100 5100   PI 4.9 5.4 4.6 5.6 5.5 5.7 5.4   Power (Serna) 3.7 3.6 3.6 3.8 3.7 3.7 3.6   LSL 4700 4700   4700  4700   Pulsatility   Intermittent pulse Intermittent pulse Intermittent pulse  Intermittent pulse

## 2024-05-25 NOTE — PROGRESS NOTES
[facility] - Cardiology Stepdown  Heart Transplant  Progress Note    Patient Name: Kevan Queen  MRN: [id number]  Admission Date: 5/20/2024  Hospital Length of Stay: 5 days  Attending Physician: Luca Lopez Jr.*  Primary Care Provider: Rosio Armendariz FNP  Principal Problem:Acute on chronic combined systolic and diastolic heart failure    Subjective:   Interval History: NAEON. No acute complaints. Discharge pending INR improvement.     Continuous Infusions:   heparin (porcine) in D5W  0-40 Units/kg/hr (Adjusted) Intravenous Continuous 10.5 mL/hr at 05/25/24 0647 14 Units/kg/hr at 05/25/24 0647    milrinone 20mg/100ml D5W (200mcg/ml)  0.25 mcg/kg/min Intravenous Continuous 7.1 mL/hr at 05/25/24 0645 0.25 mcg/kg/min at 05/25/24 0645     Scheduled Meds:   amLODIPine  5 mg Oral Daily    aspirin  81 mg Oral Daily    atorvastatin  40 mg Oral Daily    cefadroxil  500 mg Oral Q12H    furosemide  80 mg Oral Daily    insulin aspart U-100  12 Units Subcutaneous TIDWM    insulin glargine U-100  20 Units Subcutaneous BID    levETIRAcetam  1,500 mg Oral BID    magnesium oxide  400 mg Oral Daily    mirtazapine  15 mg Oral Nightly    pantoprazole  40 mg Oral Daily    sodium chloride 0.9%  10 mL Intravenous Q6H    spironolactone  25 mg Oral Daily    warfarin  6 mg Oral Daily     PRN Meds:  Current Facility-Administered Medications:     acetaminophen, 650 mg, Oral, Q6H PRN    cyclobenzaprine, 5 mg, Oral, TID PRN    dextrose 10%, 12.5 g, Intravenous, PRN    dextrose 10%, 25 g, Intravenous, PRN    glucagon (human recombinant), 1 mg, Intramuscular, PRN    glucose, 16 g, Oral, PRN    glucose, 24 g, Oral, PRN    insulin aspart U-100, 0-10 Units, Subcutaneous, QID (AC + HS) PRN    Flushing PICC/Midline Protocol, , , Until Discontinued **AND** sodium chloride 0.9%, 10 mL, Intravenous, Q6H **AND** sodium chloride 0.9%, 10 mL, Intravenous, PRN    Review of patient's allergies indicates:   Allergen Reactions    Bumex  [bumetanide] Hives    Torsemide Hives     Objective:     Vital Signs (Most Recent):  Temp: 97.7 °F (36.5 °C) (05/25/24 1158)  Pulse: 108 (05/25/24 1306)  Resp: 19 (05/25/24 1158)  BP: (!) 88/0 (05/25/24 1221)  SpO2: 99 % (05/25/24 0755) Vital Signs (24h Range):  Temp:  [97 °F (36.1 °C)-98.2 °F (36.8 °C)] 97.7 °F (36.5 °C)  Pulse:  [107-117] 108  Resp:  [17-19] 19  SpO2:  [99 %] 99 %  BP: ()/(0-70) 88/0     Patient Vitals for the past 72 hrs (Last 3 readings):   Weight   05/25/24 0755 73.8 kg (162 lb 11.2 oz)   05/24/24 0718 71.8 kg (158 lb 4.6 oz)   05/23/24 0745 72.2 kg (159 lb 2.8 oz)     Body mass index is 20.34 kg/m².      Intake/Output Summary (Last 24 hours) at 5/25/2024 1309  Last data filed at 5/25/2024 1304  Gross per 24 hour   Intake 2947.99 ml   Output 2600 ml   Net 347.99 ml       Hemodynamic Parameters:  CVP:  [14 mmHg] 14 mmHg         Physical Exam  Constitutional:       General: He is not in acute distress.     Appearance: Normal appearance.   HENT:      Head: Normocephalic and atraumatic.   Eyes:      Conjunctiva/sclera: Conjunctivae normal.   Neck:      Vascular: JVD present.      Comments: JVP 13cm of H20  Cardiovascular:      Comments: VAD hum appreciated  Pulmonary:      Breath sounds: Normal breath sounds.      Comments: Clear to auscultation  Abdominal:      Comments: Soft, non-tender, non-distended   Musculoskeletal:      Cervical back: Normal range of motion.   Skin:     General: Skin is warm and dry.      Capillary Refill: Capillary refill takes less than 2 seconds.   Neurological:      General: No focal deficit present.      Mental Status: He is alert and oriented to person, place, and time.   Psychiatric:         Mood and Affect: Mood and affect normal.            Significant Labs:  CBC:  Recent Labs   Lab 05/23/24  0342 05/24/24  0403 05/25/24  0423   WBC 6.73 8.12 7.25   RBC 4.63 4.58* 4.24*   HGB 7.8* 7.8* 7.3*   HCT 29.1* 28.6* 27.0*    335 356   MCV 63* 62* 64*   MCH  16.8* 17.0* 17.2*   MCHC 26.8* 27.3* 27.0*     BNP:  Recent Labs   Lab 05/20/24  1550 05/22/24  0424 05/24/24  0403   BNP 1,168* 777* 246*     CMP:  Recent Labs   Lab 05/23/24  0342 05/23/24 2146 05/24/24  0403 05/25/24  0423   *  --  213* 198*   CALCIUM 9.4  --  9.4 9.2   ALBUMIN 3.0*  --  2.9* 2.8*   PROT 9.1*  --  9.0* 8.5*   *  --  133* 131*   K 3.8 4.0 3.7 4.1   CO2 29  --  27 22*   CL 96  --  97 100   BUN 15  --  20 22*   CREATININE 1.1  --  1.3 1.1   ALKPHOS 194*  --  210* 180*   ALT 13  --  17 15   AST 29  --  32 28   BILITOT 1.8*  --  1.7* 1.4*      Coagulation:   Recent Labs   Lab 05/23/24  0342 05/23/24  0842 05/23/24 2143 05/24/24  0403 05/25/24  0423   INR 1.5*  --   --  1.5* 1.5*   APTT 31.0   < > 41.8* 44.2*  44.2* 43.5*    < > = values in this interval not displayed.     LDH:  Recent Labs   Lab 05/23/24 0342 05/24/24  0403 05/25/24  0423   * 278* 268*     Microbiology:  Microbiology Results (last 7 days)       Procedure Component Value Units Date/Time    Culture, Anaerobe [[id number]] Collected: 05/20/24 1653    Order Status: Completed Specimen: Wound from Abdomen Updated: 05/24/24 1253     Anaerobic Culture No anaerobes isolated    Narrative:      LEXX ALVAREZ    Aerobic culture [[id number]] Collected: 05/20/24 1653    Order Status: Completed Specimen: Wound from Abdomen Updated: 05/23/24 1053     Aerobic Bacterial Culture No growth    Narrative:      LEXX ALVAREZ    Gram stain [[id number]] Collected: 05/20/24 1653    Order Status: Completed Specimen: Wound from Abdomen Updated: 05/20/24 1957     Gram Stain Result No WBC's      No organisms seen    Narrative:      LEXX ALVAREZ            I have reviewed all pertinent labs within the past 24 hours.    Estimated Creatinine Clearance: 94.1 mL/min (based on SCr of 1.1 mg/dL).    Diagnostic Results:  I have reviewed all pertinent imaging results/findings within the past 24 hours.  Assessment and Plan:     Mr. Kevan Thacker is a 39 year old  male with stage D 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 He underwent RVAD removal and chest closure 6/30/2022.  He also completed a course of IV Abx for staph epi. He was weaned off  but he had to restarted due to RVF. He was eventually transitioned to milrinone (secondary to  shortage) now on 0.25 mcg/kg/min. He has a history of unclear syncope vs seizures and is followed by neuro for this and is on Keppra.       On 11/15/23 underwent removal of eroded [employer] scICD--no Lifevest (due to LVAD) and no plan to replace ICD as not requiring therapy post LVAD with concern for reinfection.  He has not had neurology f/u with seizure hx on keppra so coordinator to assist him with obtaining appt.    Patient presented today for routine HTS clinic appointment but was found to hunched over in a chair w/ marked dyspnea. Patient sent directly to the ED for further evaluation. States Picc line stopped working last night. Patient currently endorses chronic SOB w.exertion, but denies any SOB at rest, orthopnea, chest pain, fever, chills, nausea, vomiting, abdominal pain, abd distention, PND. States instead of take Lasix 80mg daily, he was taking Lasix 40mg bid.     Of note, patient was most recently hospitalized from 4/20-4/26 for ADHF and hyperglycemia. patient noted to be hyperglycemia w/ glucose>500 and was started on insulin drip and then switched to basal bolus dosing as per Endocrinology once hyperglycemia resolved. Patient was also started on IV lasix for ADHF. Patient throughout the hospital course had difficulty taking lasix due to extreme cramping not related to electrolyte de-arrangement. Discussion was had with patient on whether he would rather switch to hospice care  patient stated he would rather live with the discomfort associated w/ lasix. At the time of discharge patient was maintaining euvolemia w/ lasix PO 80mg daily.    Home Medications:   amlodipine besylate 5 mg Oral Daily  aspirin 81 mg Oral Daily  atorvastatin calcium 40 mg Oral Daily  cefadroxil 500 mg Oral Every 12 hours  cyclobenzaprine HCl 5 mg Oral 3 times daily PRN  furosemide 80 mg Oral Daily  Insulin detemir 20 units bid  Insulin aspart 18units tid  Keppra 1500mg bid  magnesium oxide 400 mg Oral Daily  milrinone lactate,milrinone lactate/D5W 0.25 mcg/kg/min (23.6 mcg/min) Intravenous Continuous  mirtazapine 15 mg Oral Nightly  pantoprazole sodium 40 mg Oral Daily  potassium chloride 10 MEQ 30 mEq Oral Daily  spironolactone 25 mg Oral Daily  warfarin sodium 3 MG Take 1.5 tablets (4.5mg) orally daily, except 1 tablet (3mg) on Sundays and Fridays        Cardiac Imaging:   Echo (9/2/2023): HM3 5100. AV does not open. IV septum midline. LV sevewrely dilated w/ normal ventricular mass, normal wall thickness, normal wall motion, severely reduced systolic function w/ EF 10-15%, and normal diastolic function. RV normal cavity size w/ normal wall thickness, normal wall motion, and normal systolic function. Mild MR. Mod to severe TR. PA sys pressure 38mmHg. LVIDd 7.11cm.     RHC (10/31/2022): RA 18, PWP 21, CO 3.55, CI 1.6. On mil 0.125 and HM3 5100.     * Acute on chronic combined systolic and diastolic heart failure  -NICM s/p HM 3 on home Milrinone   -Last 2D Echo  9/5/23 : LVEF 10-15%, LVEDD  7.11 cm with speed at 5100  -Last RHC: 10/31/22 with speed at 5100 on Milrinone 0.125 RA: 18, RV: 35/7 (22), PAP: 35/19 (24), PWP: 21, CO: 3.55, CI: 1.6  -Elevated JVP however in the setting of RHF this might be his sweetspot. Will start home regimen PO lasix 80mg daily.   -Continue home milrinone 0.25  -GDMT with N/A  -2g Na dietary restriction, 1500 mL fluid restriction, strict I/Os    LVAD (left ventricular assist device)  present  Procedure: Device Interrogation Including analysis of device parameters  Current Settings: Ventricular Assist Device  Review of device function is stable/unstable stable  Anticoagulation w/ coumadin. Goal INR 2-3. S/p 1mg Vit K on 5/21. Currenly subtherapeuic. Continue coumadin w/ heparin bridge.         5/25/2024    12:22 PM 5/25/2024     8:29 AM 5/25/2024     4:15 AM 5/25/2024    12:32 AM 5/24/2024     8:30 PM 5/24/2024     4:30 PM 5/24/2024     1:15 PM   TXP LVAD INTERROGATIONS   Type HeartMate3 HeartMate3 HeartMate3 HeartMate3 HeartMate3 HeartMate3 HeartMate3   Flow 4.4 4.1 4.3 4 4.2 4.1 4.1   Speed 5100 5100 5100 5100 5100 5100 5100   PI 4.9 5.4 4.6 5.6 5.5 5.7 5.4   Power (Serna) 3.7 3.6 3.6 3.8 3.7 3.7 3.6   LSL 4700 4700   4700  4700   Pulsatility   Intermittent pulse Intermittent pulse Intermittent pulse  Intermittent pulse         Seizure-like activity  Continue home keppra.     Type 2 diabetes mellitus with hyperglycemia, with long-term current use of insulin  Management as per Endocrinology.     HTN (hypertension)  - continue amlodopine.         Chantal Herndon MD  Heart Transplant  [facility] - [facility] Step[facility]

## 2024-05-25 NOTE — CARE UPDATE
-Glucose Goal 140-180    -A1C:   Hemoglobin A1C   Date Value Ref Range Status   04/26/2024 >14.0 (H) 4.0 - 5.6 % Final     Comment:     ADA Screening Guidelines:  5.7-6.4%  Consistent with prediabetes  >or=6.5%  Consistent with diabetes    High levels of fetal hemoglobin interfere with the HbA1C  assay. Heterozygous hemoglobin variants (HbS, HgC, etc)do  not significantly interfere with this assay.   However, presence of multiple variants may affect accuracy.           -HOME REGIMEN: -Levemir 20 units BID   -Novolog 18 units TIDWM in addition to the following correction scale:    150 - 200 + 2 unit    201 - 250 + 4 units    251 - 300 + 6 units    301 - 350 + 8 units       > 350   + 10 units    -GLUCOSE TREND FOR THE PAST 24HRS:   Recent Labs   Lab 05/23/24  1909 05/24/24  0817 05/24/24  1147 05/24/24  1628 05/24/24  1945 05/25/24  0752   POCTGLUCOSE 137* 171* 93 117* 192* 192*         -NO HYPOGYCEMIAS NOTED     - Diet  Diet diabetic 2000 Calorie; Fluid - 1500mL    -TOLERATING 100 % OF PO DIET       Plan:   BG goal 140-180     - Continue Lantus (Insulin Glargine) 20 units BID  - Continue Novolog (Insulin Aspart) 12 units TIDWM and prn for BG excursions MDC SSI (150/25)  - Hypoglycemia protocol in place        ** Please notify Endocrine for any change and/or advance in diet**  ** Please call Endocrine for any BG related issues **     Discharge Planning:   TBD. Please notify endocrinology prior to discharge.

## 2024-05-25 NOTE — NURSING
Patient declined dressing change now. Prefers to have it done later tonight. Will pass on to night shift nurse. Will continue to monitor.

## 2024-05-25 NOTE — SUBJECTIVE & OBJECTIVE
Interval History: NAEON. No acute complaints. Discharge pending INR improvement.     Continuous Infusions:   heparin (porcine) in D5W  0-40 Units/kg/hr (Adjusted) Intravenous Continuous 10.5 mL/hr at 05/25/24 0647 14 Units/kg/hr at 05/25/24 0647    milrinone 20mg/100ml D5W (200mcg/ml)  0.25 mcg/kg/min Intravenous Continuous 7.1 mL/hr at 05/25/24 0645 0.25 mcg/kg/min at 05/25/24 0645     Scheduled Meds:   amLODIPine  5 mg Oral Daily    aspirin  81 mg Oral Daily    atorvastatin  40 mg Oral Daily    cefadroxil  500 mg Oral Q12H    furosemide  80 mg Oral Daily    insulin aspart U-100  12 Units Subcutaneous TIDWM    insulin glargine U-100  20 Units Subcutaneous BID    levETIRAcetam  1,500 mg Oral BID    magnesium oxide  400 mg Oral Daily    mirtazapine  15 mg Oral Nightly    pantoprazole  40 mg Oral Daily    sodium chloride 0.9%  10 mL Intravenous Q6H    spironolactone  25 mg Oral Daily    warfarin  6 mg Oral Daily     PRN Meds:  Current Facility-Administered Medications:     acetaminophen, 650 mg, Oral, Q6H PRN    cyclobenzaprine, 5 mg, Oral, TID PRN    dextrose 10%, 12.5 g, Intravenous, PRN    dextrose 10%, 25 g, Intravenous, PRN    glucagon (human recombinant), 1 mg, Intramuscular, PRN    glucose, 16 g, Oral, PRN    glucose, 24 g, Oral, PRN    insulin aspart U-100, 0-10 Units, Subcutaneous, QID (AC + HS) PRN    Flushing PICC/Midline Protocol, , , Until Discontinued **AND** sodium chloride 0.9%, 10 mL, Intravenous, Q6H **AND** sodium chloride 0.9%, 10 mL, Intravenous, PRN    Review of patient's allergies indicates:   Allergen Reactions    Bumex [bumetanide] Hives    Torsemide Hives     Objective:     Vital Signs (Most Recent):  Temp: 97.7 °F (36.5 °C) (05/25/24 1158)  Pulse: 108 (05/25/24 1306)  Resp: 19 (05/25/24 1158)  BP: (!) 88/0 (05/25/24 1221)  SpO2: 99 % (05/25/24 0755) Vital Signs (24h Range):  Temp:  [97 °F (36.1 °C)-98.2 °F (36.8 °C)] 97.7 °F (36.5 °C)  Pulse:  [107-117] 108  Resp:  [17-19] 19  SpO2:  [99 %]  99 %  BP: ()/(0-70) 88/0     Patient Vitals for the past 72 hrs (Last 3 readings):   Weight   05/25/24 0755 73.8 kg (162 lb 11.2 oz)   05/24/24 0718 71.8 kg (158 lb 4.6 oz)   05/23/24 0745 72.2 kg (159 lb 2.8 oz)     Body mass index is 20.34 kg/m².      Intake/Output Summary (Last 24 hours) at 5/25/2024 1309  Last data filed at 5/25/2024 1304  Gross per 24 hour   Intake 2947.99 ml   Output 2600 ml   Net 347.99 ml       Hemodynamic Parameters:  CVP:  [14 mmHg] 14 mmHg         Physical Exam  Constitutional:       General: He is not in acute distress.     Appearance: Normal appearance.   HENT:      Head: Normocephalic and atraumatic.   Eyes:      Conjunctiva/sclera: Conjunctivae normal.   Neck:      Vascular: JVD present.      Comments: JVP 13cm of H20  Cardiovascular:      Comments: VAD hum appreciated  Pulmonary:      Breath sounds: Normal breath sounds.      Comments: Clear to auscultation  Abdominal:      Comments: Soft, non-tender, non-distended   Musculoskeletal:      Cervical back: Normal range of motion.   Skin:     General: Skin is warm and dry.      Capillary Refill: Capillary refill takes less than 2 seconds.   Neurological:      General: No focal deficit present.      Mental Status: He is alert and oriented to person, place, and time.   Psychiatric:         Mood and Affect: Mood and affect normal.            Significant Labs:  CBC:  Recent Labs   Lab 05/23/24  0342 05/24/24  0403 05/25/24  0423   WBC 6.73 8.12 7.25   RBC 4.63 4.58* 4.24*   HGB 7.8* 7.8* 7.3*   HCT 29.1* 28.6* 27.0*    335 356   MCV 63* 62* 64*   MCH 16.8* 17.0* 17.2*   MCHC 26.8* 27.3* 27.0*     BNP:  Recent Labs   Lab 05/20/24  1550 05/22/24  0424 05/24/24  0403   BNP 1,168* 777* 246*     CMP:  Recent Labs   Lab 05/23/24  0342 05/23/24  2146 05/24/24  0403 05/25/24  0423   *  --  213* 198*   CALCIUM 9.4  --  9.4 9.2   ALBUMIN 3.0*  --  2.9* 2.8*   PROT 9.1*  --  9.0* 8.5*   *  --  133* 131*   K 3.8 4.0 3.7 4.1    CO2 29  --  27 22*   CL 96  --  97 100   BUN 15  --  20 22*   CREATININE 1.1  --  1.3 1.1   ALKPHOS 194*  --  210* 180*   ALT 13  --  17 15   AST 29  --  32 28   BILITOT 1.8*  --  1.7* 1.4*      Coagulation:   Recent Labs   Lab 05/23/24  0342 05/23/24  0842 05/23/24  2143 05/24/24  0403 05/25/24  0423   INR 1.5*  --   --  1.5* 1.5*   APTT 31.0   < > 41.8* 44.2*  44.2* 43.5*    < > = values in this interval not displayed.     LDH:  Recent Labs   Lab 05/23/24 0342 05/24/24  0403 05/25/24  0423   * 278* 268*     Microbiology:  Microbiology Results (last 7 days)       Procedure Component Value Units Date/Time    Culture, Anaerobe [[id number]] Collected: 05/20/24 1653    Order Status: Completed Specimen: Wound from Abdomen Updated: 05/24/24 1253     Anaerobic Culture No anaerobes isolated    Narrative:      LEXX ALVAREZ    Aerobic culture [[id number]] Collected: 05/20/24 1653    Order Status: Completed Specimen: Wound from Abdomen Updated: 05/23/24 1053     Aerobic Bacterial Culture No growth    Narrative:      LEXX ALVAREZ    Gram stain [[id number]] Collected: 05/20/24 1653    Order Status: Completed Specimen: Wound from Abdomen Updated: 05/20/24 1957     Gram Stain Result No WBC's      No organisms seen    Narrative:      LEXX ALVAREZ            I have reviewed all pertinent labs within the past 24 hours.    Estimated Creatinine Clearance: 94.1 mL/min (based on SCr of 1.1 mg/dL).    Diagnostic Results:  I have reviewed all pertinent imaging results/findings within the past 24 hours.

## 2024-05-26 VITALS
BODY MASS INDEX: 21 KG/M2 | RESPIRATION RATE: 18 BRPM | HEIGHT: 75 IN | DIASTOLIC BLOOD PRESSURE: 67 MMHG | HEART RATE: 107 BPM | TEMPERATURE: 98 F | SYSTOLIC BLOOD PRESSURE: 118 MMHG | OXYGEN SATURATION: 98 % | WEIGHT: 168.88 LBS

## 2024-05-26 LAB
ALBUMIN SERPL BCP-MCNC: 2.8 G/DL (ref 3.5–5.2)
ALP SERPL-CCNC: 183 U/L (ref 55–135)
ALT SERPL W/O P-5'-P-CCNC: 16 U/L (ref 10–44)
ANION GAP SERPL CALC-SCNC: 9 MMOL/L (ref 8–16)
APTT PPP: 47.3 SEC (ref 21–32)
AST SERPL-CCNC: 27 U/L (ref 10–40)
BASOPHILS # BLD AUTO: 0.05 K/UL (ref 0–0.2)
BASOPHILS NFR BLD: 0.6 % (ref 0–1.9)
BILIRUB SERPL-MCNC: 1.4 MG/DL (ref 0.1–1)
BUN SERPL-MCNC: 20 MG/DL (ref 6–20)
CALCIUM SERPL-MCNC: 9 MG/DL (ref 8.7–10.5)
CHLORIDE SERPL-SCNC: 98 MMOL/L (ref 95–110)
CO2 SERPL-SCNC: 25 MMOL/L (ref 23–29)
CREAT SERPL-MCNC: 1.3 MG/DL (ref 0.5–1.4)
DIFFERENTIAL METHOD BLD: ABNORMAL
EOSINOPHIL # BLD AUTO: 0.1 K/UL (ref 0–0.5)
EOSINOPHIL NFR BLD: 1.2 % (ref 0–8)
ERYTHROCYTE [DISTWIDTH] IN BLOOD BY AUTOMATED COUNT: 25.1 % (ref 11.5–14.5)
EST. GFR  (NO RACE VARIABLE): >60 ML/MIN/1.73 M^2
GLUCOSE SERPL-MCNC: 227 MG/DL (ref 70–110)
HCT VFR BLD AUTO: 26.9 % (ref 40–54)
HGB BLD-MCNC: 7.3 G/DL (ref 14–18)
IMM GRANULOCYTES # BLD AUTO: 0.03 K/UL (ref 0–0.04)
IMM GRANULOCYTES NFR BLD AUTO: 0.4 % (ref 0–0.5)
INR PPP: 2.1 (ref 0.8–1.2)
LDH SERPL L TO P-CCNC: 270 U/L (ref 110–260)
LYMPHOCYTES # BLD AUTO: 1.6 K/UL (ref 1–4.8)
LYMPHOCYTES NFR BLD: 19.9 % (ref 18–48)
MAGNESIUM SERPL-MCNC: 1.8 MG/DL (ref 1.6–2.6)
MCH RBC QN AUTO: 16.9 PG (ref 27–31)
MCHC RBC AUTO-ENTMCNC: 27.1 G/DL (ref 32–36)
MCV RBC AUTO: 62 FL (ref 82–98)
MONOCYTES # BLD AUTO: 0.7 K/UL (ref 0.3–1)
MONOCYTES NFR BLD: 8.2 % (ref 4–15)
NEUTROPHILS # BLD AUTO: 5.6 K/UL (ref 1.8–7.7)
NEUTROPHILS NFR BLD: 69.7 % (ref 38–73)
NRBC BLD-RTO: 0 /100 WBC
PLATELET # BLD AUTO: 363 K/UL (ref 150–450)
PMV BLD AUTO: 9.6 FL (ref 9.2–12.9)
POCT GLUCOSE: 104 MG/DL (ref 70–110)
POCT GLUCOSE: 121 MG/DL (ref 70–110)
POCT GLUCOSE: 193 MG/DL (ref 70–110)
POCT GLUCOSE: 203 MG/DL (ref 70–110)
POCT GLUCOSE: 96 MG/DL (ref 70–110)
POTASSIUM SERPL-SCNC: 4 MMOL/L (ref 3.5–5.1)
PROT SERPL-MCNC: 8.6 G/DL (ref 6–8.4)
PROTHROMBIN TIME: 21.5 SEC (ref 9–12.5)
RBC # BLD AUTO: 4.31 M/UL (ref 4.6–6.2)
SODIUM SERPL-SCNC: 132 MMOL/L (ref 136–145)
WBC # BLD AUTO: 8.04 K/UL (ref 3.9–12.7)

## 2024-05-26 PROCEDURE — 83735 ASSAY OF MAGNESIUM: CPT | Performed by: STUDENT IN AN ORGANIZED HEALTH CARE EDUCATION/TRAINING PROGRAM

## 2024-05-26 PROCEDURE — 63600175 PHARM REV CODE 636 W HCPCS: Performed by: STUDENT IN AN ORGANIZED HEALTH CARE EDUCATION/TRAINING PROGRAM

## 2024-05-26 PROCEDURE — 25000003 PHARM REV CODE 250: Performed by: INTERNAL MEDICINE

## 2024-05-26 PROCEDURE — 27000248 HC VAD-ADDITIONAL DAY

## 2024-05-26 PROCEDURE — 80053 COMPREHEN METABOLIC PANEL: CPT | Performed by: STUDENT IN AN ORGANIZED HEALTH CARE EDUCATION/TRAINING PROGRAM

## 2024-05-26 PROCEDURE — A4216 STERILE WATER/SALINE, 10 ML: HCPCS | Performed by: INTERNAL MEDICINE

## 2024-05-26 PROCEDURE — 25000003 PHARM REV CODE 250: Performed by: STUDENT IN AN ORGANIZED HEALTH CARE EDUCATION/TRAINING PROGRAM

## 2024-05-26 PROCEDURE — 83615 LACTATE (LD) (LDH) ENZYME: CPT | Performed by: STUDENT IN AN ORGANIZED HEALTH CARE EDUCATION/TRAINING PROGRAM

## 2024-05-26 PROCEDURE — 20600001 HC STEP DOWN PRIVATE ROOM

## 2024-05-26 PROCEDURE — 85610 PROTHROMBIN TIME: CPT | Performed by: STUDENT IN AN ORGANIZED HEALTH CARE EDUCATION/TRAINING PROGRAM

## 2024-05-26 PROCEDURE — 99233 SBSQ HOSP IP/OBS HIGH 50: CPT | Mod: ,,, | Performed by: INTERNAL MEDICINE

## 2024-05-26 PROCEDURE — 85730 THROMBOPLASTIN TIME PARTIAL: CPT | Performed by: STUDENT IN AN ORGANIZED HEALTH CARE EDUCATION/TRAINING PROGRAM

## 2024-05-26 PROCEDURE — 93750 INTERROGATION VAD IN PERSON: CPT | Mod: ,,, | Performed by: INTERNAL MEDICINE

## 2024-05-26 PROCEDURE — 85025 COMPLETE CBC W/AUTO DIFF WBC: CPT | Performed by: STUDENT IN AN ORGANIZED HEALTH CARE EDUCATION/TRAINING PROGRAM

## 2024-05-26 PROCEDURE — 63600175 PHARM REV CODE 636 W HCPCS

## 2024-05-26 RX ORDER — WARFARIN 1 MG/1
1.5 TABLET ORAL ONCE
Qty: 2 TABLET | Refills: 0 | Status: SHIPPED | OUTPATIENT
Start: 2024-05-26 | End: 2024-05-26 | Stop reason: HOSPADM

## 2024-05-26 RX ORDER — WARFARIN 1 MG/1
3 TABLET ORAL DAILY
Qty: 90 TABLET | Refills: 11 | Status: SHIPPED | OUTPATIENT
Start: 2024-05-27 | End: 2024-05-26

## 2024-05-26 RX ORDER — CYCLOBENZAPRINE HCL 5 MG
5 TABLET ORAL DAILY PRN
Qty: 30 TABLET | Refills: 3 | Status: SHIPPED | OUTPATIENT
Start: 2024-05-26 | End: 2024-09-23

## 2024-05-26 RX ORDER — WARFARIN 3 MG/1
3 TABLET ORAL DAILY
Qty: 30 TABLET | Refills: 5 | Status: SHIPPED | OUTPATIENT
Start: 2024-05-27 | End: 2025-05-27

## 2024-05-26 RX ORDER — INSULIN ASPART 100 [IU]/ML
9 INJECTION, SOLUTION INTRAVENOUS; SUBCUTANEOUS
Status: DISCONTINUED | OUTPATIENT
Start: 2024-05-26 | End: 2024-05-27 | Stop reason: HOSPADM

## 2024-05-26 RX ADMIN — INSULIN GLARGINE 20 UNITS: 100 INJECTION, SOLUTION SUBCUTANEOUS at 08:05

## 2024-05-26 RX ADMIN — INSULIN ASPART 9 UNITS: 100 INJECTION, SOLUTION INTRAVENOUS; SUBCUTANEOUS at 05:05

## 2024-05-26 RX ADMIN — AMLODIPINE BESYLATE 5 MG: 5 TABLET ORAL at 08:05

## 2024-05-26 RX ADMIN — CEFADROXIL 500 MG: 500 CAPSULE ORAL at 08:05

## 2024-05-26 RX ADMIN — WARFARIN SODIUM 1.5 MG: 1 TABLET ORAL at 03:05

## 2024-05-26 RX ADMIN — ASPIRIN 81 MG: 81 TABLET, COATED ORAL at 08:05

## 2024-05-26 RX ADMIN — ATORVASTATIN CALCIUM 40 MG: 20 TABLET, FILM COATED ORAL at 08:05

## 2024-05-26 RX ADMIN — INSULIN ASPART 12 UNITS: 100 INJECTION, SOLUTION INTRAVENOUS; SUBCUTANEOUS at 08:05

## 2024-05-26 RX ADMIN — MILRINONE LACTATE IN DEXTROSE 0.25 MCG/KG/MIN: 200 INJECTION, SOLUTION INTRAVENOUS at 03:05

## 2024-05-26 RX ADMIN — LEVETIRACETAM 1500 MG: 500 TABLET, FILM COATED ORAL at 08:05

## 2024-05-26 RX ADMIN — MIRTAZAPINE 15 MG: 15 TABLET, FILM COATED ORAL at 08:05

## 2024-05-26 RX ADMIN — HEPARIN SODIUM 14 UNITS/KG/HR: 10000 INJECTION, SOLUTION INTRAVENOUS at 07:05

## 2024-05-26 RX ADMIN — Medication 400 MG: at 08:05

## 2024-05-26 RX ADMIN — Medication 10 ML: at 05:05

## 2024-05-26 RX ADMIN — INSULIN GLARGINE 20 UNITS: 100 INJECTION, SOLUTION SUBCUTANEOUS at 10:05

## 2024-05-26 RX ADMIN — PANTOPRAZOLE SODIUM 40 MG: 40 TABLET, DELAYED RELEASE ORAL at 08:05

## 2024-05-26 RX ADMIN — SPIRONOLACTONE 25 MG: 25 TABLET ORAL at 08:05

## 2024-05-26 RX ADMIN — INSULIN ASPART 12 UNITS: 100 INJECTION, SOLUTION INTRAVENOUS; SUBCUTANEOUS at 01:05

## 2024-05-26 NOTE — PROGRESS NOTES
On Call SW Note:     SW received secure chat this morning from MD that patient discharging today to home with his IV milrinone and inquiring if had been delivered yet.  SW reached out to [facility] [phone], leaving a voice mail for return call regarding patients discharge.   TREY received a call back from clinician Kadeem, after providing patients information, Kadeem inquiring about patients last dose.  SW needed to reach out to MD and bedside nurse Jovan for this information.  The bedside nurse in process of communicating with Kadeem from [facility] at [phone].   Since milrinone continuous, meds may need to be delivered to bedside.   On call Transplant SW remains available for any further discharge psychosocial needs.

## 2024-05-26 NOTE — CARE UPDATE
-Glucose Goal 140-180    -A1C:   Hemoglobin A1C   Date Value Ref Range Status   04/26/2024 >14.0 (H) 4.0 - 5.6 % Final     Comment:     ADA Screening Guidelines:  5.7-6.4%  Consistent with prediabetes  >or=6.5%  Consistent with diabetes    High levels of fetal hemoglobin interfere with the HbA1C  assay. Heterozygous hemoglobin variants (HbS, HgC, etc)do  not significantly interfere with this assay.   However, presence of multiple variants may affect accuracy.           -HOME REGIMEN: -Levemir 20 units BID   -Novolog 18 units TIDWM in addition to the following correction scale:    150 - 200 + 2 unit    201 - 250 + 4 units    251 - 300 + 6 units    301 - 350 + 8 units       > 350   + 10 units    -GLUCOSE TREND FOR THE PAST 24HRS:   Recent Labs   Lab 05/25/24  0752 05/25/24  1200 05/25/24  1607 05/25/24  1928 05/26/24  0158 05/26/24  0811   POCTGLUCOSE 192* 165* 187* 129* 203* 121*         -NO HYPOGYCEMIAS NOTED     - Diet  Diet diabetic 2000 Calorie; Fluid - 1500mL    -TOLERATING 100 % OF PO DIET     Of note, patient ate cereal/sandwiches/meals overnight.     Plan:   BG goal 140-180     - Continue Lantus (Insulin Glargine) 20 units BID  - Continue Novolog (Insulin Aspart) 12 units TIDWM and prn for BG excursions MDC SSI (150/25)  - Hypoglycemia protocol in place        ** Please notify Endocrine for any change and/or advance in diet**  ** Please call Endocrine for any BG related issues **     Discharge Planning:   TBD. Please notify endocrinology prior to discharge.

## 2024-05-26 NOTE — PROGRESS NOTES
Discharge    Pt presents in room aaox4 with open affect. Caregiver not present at this time. Pt voices agreement with plan to discharge to home today. SW informed Franki, [phone]  and [facility] [phone], fax [phone] of orders and discharge.  Pt states his SO Page will be coming today to drive him home today. Pt reports coping well and denies further needs, questions, concerns at this time and none indicated. Providing psychosocial and counseling support, education, resources, assistance and discharge planning as indicated. SW remains available.

## 2024-05-26 NOTE — PROGRESS NOTES
05/26/2024  John Alaniz    Current provider:  Luca Lopez Jr.*    Device interrogation:      5/26/2024    11:53 AM 5/26/2024     8:13 AM 5/26/2024     4:30 AM 5/25/2024    11:00 PM 5/25/2024     8:58 PM 5/25/2024     4:47 PM 5/25/2024    12:22 PM   TXP LVAD INTERROGATIONS   Type HeartMate3 HeartMate3 HeartMate3 HeartMate3 HeartMate3 HeartMate3 HeartMate3   Flow 4.5 4.3 4.4 4 4.5 4.5 4.4   Speed 5100 5100 5150 5100 5100 5100 5100   PI 4.1 5.1 4.4 5.6 4.2 4.2 4.9   Power (Serna) 3.6 3.6 3.6 3.6 3.6 3.7 3.7   LSL 4700 4700     4700   Pulsatility   Intermittent pulse Intermittent pulse Intermittent pulse            Rounded on Kevan Queen to ensure all mechanical assist device settings (IABP or VAD) were appropriate and all parameters were within limits.  I was able to ensure all back up equipment was present, the staff had no issues, and the Perfusion Department daily rounding was complete.      For implantable VADs: Interrogation of Ventricular assist device was performed with analysis of device parameters and review of device function. I have personally reviewed the interrogation findings and agree with findings as stated.     In emergency, the nursing units have been notified to contact the perfusion department either by:  Calling [id number] from 630am to 4pm Mon thru Fri, utilizing the On-Call Finder functionality of Epic and searching for Perfusion, or by contacting the hospital  from 4pm to 630am and on weekends and asking to speak with the perfusionist on call.    1:08 PM

## 2024-05-26 NOTE — HOSPITAL COURSE
Upon admission, patient was diuresed well w/ IV lasix 80mg tid and switched to his home lasix 80mg daily. During admission patient also had his picc line replaced due to non-functioning line. Patient to have INR checked on Tuesday. At the time of discharge patient was clinically and hemodynamcally stable. All questions answered. He agreed w/ plan for discharge.

## 2024-05-26 NOTE — SUBJECTIVE & OBJECTIVE
Interval History: NAEON. No acute complaints. Discharging today.     Continuous Infusions:   milrinone 20mg/100ml D5W (200mcg/ml)  0.25 mcg/kg/min Intravenous Continuous 7.1 mL/hr at 05/25/24 2325 0.25 mcg/kg/min at 05/25/24 2325     Scheduled Meds:   amLODIPine  5 mg Oral Daily    aspirin  81 mg Oral Daily    atorvastatin  40 mg Oral Daily    cefadroxil  500 mg Oral Q12H    insulin aspart U-100  12 Units Subcutaneous TIDWM    insulin glargine U-100  20 Units Subcutaneous BID    levETIRAcetam  1,500 mg Oral BID    magnesium oxide  400 mg Oral Daily    mirtazapine  15 mg Oral Nightly    pantoprazole  40 mg Oral Daily    sodium chloride 0.9%  10 mL Intravenous Q6H    spironolactone  25 mg Oral Daily    warfarin  6 mg Oral Daily     PRN Meds:  Current Facility-Administered Medications:     acetaminophen, 650 mg, Oral, Q6H PRN    cyclobenzaprine, 5 mg, Oral, TID PRN    dextrose 10%, 12.5 g, Intravenous, PRN    dextrose 10%, 25 g, Intravenous, PRN    glucagon (human recombinant), 1 mg, Intramuscular, PRN    glucose, 16 g, Oral, PRN    glucose, 24 g, Oral, PRN    insulin aspart U-100, 0-10 Units, Subcutaneous, QID (AC + HS) PRN    Flushing PICC/Midline Protocol, , , Until Discontinued **AND** sodium chloride 0.9%, 10 mL, Intravenous, Q6H **AND** sodium chloride 0.9%, 10 mL, Intravenous, PRN    Review of patient's allergies indicates:   Allergen Reactions    Bumex [bumetanide] Hives    Torsemide Hives     Objective:     Vital Signs (Most Recent):  Temp: 98.2 °F (36.8 °C) (05/26/24 0812)  Pulse: 107 (05/26/24 0812)  Resp: 18 (05/26/24 0812)  BP: (!) 86/0 (05/26/24 0812)  SpO2: 97 % (05/26/24 0812) Vital Signs (24h Range):  Temp:  [97.7 °F (36.5 °C)-98.5 °F (36.9 °C)] 98.2 °F (36.8 °C)  Pulse:  [107-119] 107  Resp:  [18-19] 18  SpO2:  [97 %-100 %] 97 %  BP: (72-88)/(0) 86/0     Patient Vitals for the past 72 hrs (Last 3 readings):   Weight   05/26/24 0800 76.6 kg (168 lb 14 oz)   05/25/24 0755 73.8 kg (162 lb 11.2 oz)    05/24/24 0718 71.8 kg (158 lb 4.6 oz)     Body mass index is 21.11 kg/m².      Intake/Output Summary (Last 24 hours) at 5/26/2024 0913  Last data filed at 5/26/2024 0800  Gross per 24 hour   Intake 1962.36 ml   Output 4250 ml   Net -2287.64 ml       Hemodynamic Parameters:  CVP:  [16 mmHg] 16 mmHg         Physical Exam  Constitutional:       General: He is not in acute distress.     Appearance: Normal appearance.   HENT:      Head: Normocephalic and atraumatic.   Eyes:      Conjunctiva/sclera: Conjunctivae normal.   Neck:      Vascular: JVD present.      Comments: JVP 13cm of H20  Cardiovascular:      Comments: VAD hum appreciated  Pulmonary:      Breath sounds: Normal breath sounds.      Comments: Clear to auscultation  Abdominal:      Comments: Soft, non-tender, non-distended   Musculoskeletal:      Cervical back: Normal range of motion.   Skin:     General: Skin is warm and dry.      Capillary Refill: Capillary refill takes less than 2 seconds.   Neurological:      General: No focal deficit present.      Mental Status: He is alert and oriented to person, place, and time.   Psychiatric:         Mood and Affect: Mood and affect normal.            Significant Labs:  CBC:  Recent Labs   Lab 05/24/24  0403 05/25/24  0423 05/26/24  0417   WBC 8.12 7.25 8.04   RBC 4.58* 4.24* 4.31*   HGB 7.8* 7.3* 7.3*   HCT 28.6* 27.0* 26.9*    356 363   MCV 62* 64* 62*   MCH 17.0* 17.2* 16.9*   MCHC 27.3* 27.0* 27.1*     BNP:  Recent Labs   Lab 05/20/24  1550 05/22/24  0424 05/24/24  0403   BNP 1,168* 777* 246*     CMP:  Recent Labs   Lab 05/24/24  0403 05/25/24  0423 05/25/24  1511 05/25/24  1843 05/26/24  0438   * 198*  --   --  227*   CALCIUM 9.4 9.2  --   --  9.0   ALBUMIN 2.9* 2.8*  --   --  2.8*   PROT 9.0* 8.5*  --   --  8.6*   * 131*  --   --  132*   K 3.7 4.1 3.5 3.8 4.0   CO2 27 22*  --   --  25   CL 97 100  --   --  98   BUN 20 22*  --   --  20   CREATININE 1.3 1.1  --   --  1.3   ALKPHOS 210* 180*  --    --  183*   ALT 17 15  --   --  16   AST 32 28  --   --  27   BILITOT 1.7* 1.4*  --   --  1.4*      Coagulation:   Recent Labs   Lab 05/24/24 0403 05/25/24 0423 05/26/24 0417   INR 1.5* 1.5* 2.1*   APTT 44.2*  44.2* 43.5* 47.3*     LDH:  Recent Labs   Lab 05/24/24 0403 05/25/24 0423 05/26/24 0417   * 268* 270*     Microbiology:  Microbiology Results (last 7 days)       Procedure Component Value Units Date/Time    Culture, Anaerobe [[id number]] Collected: 05/20/24 1653    Order Status: Completed Specimen: Wound from Abdomen Updated: 05/24/24 1253     Anaerobic Culture No anaerobes isolated    Narrative:      LEXX ALVAREZ    Aerobic culture [[id number]] Collected: 05/20/24 1653    Order Status: Completed Specimen: Wound from Abdomen Updated: 05/23/24 1053     Aerobic Bacterial Culture No growth    Narrative:      LEXX ALVAREZ    Gram stain [[id number]] Collected: 05/20/24 1653    Order Status: Completed Specimen: Wound from Abdomen Updated: 05/20/24 1957     Gram Stain Result No WBC's      No organisms seen    Narrative:      LEXX ALVAREZ            I have reviewed all pertinent labs within the past 24 hours.    Estimated Creatinine Clearance: 82.7 mL/min (based on SCr of 1.3 mg/dL).    Diagnostic Results:  I have reviewed all pertinent imaging results/findings within the past 24 hours.

## 2024-05-26 NOTE — PROGRESS NOTES
05/25/2024  John Alaniz    Current provider:  Luca Lopez Jr.*    Device interrogation:      5/25/2024     4:47 PM 5/25/2024    12:22 PM 5/25/2024     8:29 AM 5/25/2024     4:15 AM 5/25/2024    12:32 AM 5/24/2024     8:30 PM 5/24/2024     4:30 PM   TXP LVAD INTERROGATIONS   Type HeartMate3 HeartMate3 HeartMate3 HeartMate3 HeartMate3 HeartMate3 HeartMate3   Flow 4.5 4.4 4.1 4.3 4 4.2 4.1   Speed 5100 5100 5100 5100 5100 5100 5100   PI 4.2 4.9 5.4 4.6 5.6 5.5 5.7   Power (Serna) 3.7 3.7 3.6 3.6 3.8 3.7 3.7   LSL  4700 4700   4700    Pulsatility    Intermittent pulse Intermittent pulse Intermittent pulse           Rounded on Kevan Queen to ensure all mechanical assist device settings (IABP or VAD) were appropriate and all parameters were within limits.  I was able to ensure all back up equipment was present, the staff had no issues, and the Perfusion Department daily rounding was complete.      For implantable VADs: Interrogation of Ventricular assist device was performed with analysis of device parameters and review of device function. I have personally reviewed the interrogation findings and agree with findings as stated.     In emergency, the nursing units have been notified to contact the perfusion department either by:  Calling [phone] from 630am to 4pm Mon thru Fri, utilizing the On-Call Finder functionality of Epic and searching for Perfusion, or by contacting the hospital  from 4pm to 630am and on weekends and asking to speak with the perfusionist on call.    8:02 PM

## 2024-05-26 NOTE — DISCHARGE SUMMARY
[facility] - Cardiology Stepdown  Heart Transplant  Discharge Summary      Patient Name: Kevan Queen  MRN: [id number]  Admission Date: 5/20/2024  Hospital Length of Stay: 6 days  Discharge Date and Time: 05/26/2024 12:21 PM  Attending Physician: Luca Lopez Jr.*   Discharging Provider: Chantal Herndon MD  Primary Care Provider: Rosio Armendariz FNP     HPI: Mr. 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 He underwent RVAD removal and chest closure 6/30/2022.  He also completed a course of IV Abx for staph epi. He was weaned off  but he had to restarted due to RVF. He was eventually transitioned to milrinone (secondary to  shortage) now on 0.25 mcg/kg/min. He has a history of unclear syncope vs seizures and is followed by neuro for this and is on Keppra.       On 11/15/23 underwent removal of eroded [employer] scICD--no Lifevest (due to LVAD) and no plan to replace ICD as not requiring therapy post LVAD with concern for reinfection.  He has not had neurology f/u with seizure hx on keppra so coordinator to assist him with obtaining appt.    Patient presented today for routine HTS clinic appointment but was found to hunched over in a chair w/ marked dyspnea. Patient sent directly to the ED for further evaluation. States Picc line stopped working last night. Patient currently endorses chronic SOB w.exertion, but denies any SOB at rest, orthopnea, chest pain, fever, chills, nausea, vomiting, abdominal pain, abd distention, PND. States instead of take Lasix 80mg daily, he was taking Lasix 40mg bid.     Of note, patient was most recently hospitalized from 4/20-4/26 for ADHF and hyperglycemia. patient noted to be  hyperglycemia w/ glucose>500 and was started on insulin drip and then switched to basal bolus dosing as per Endocrinology once hyperglycemia resolved. Patient was also started on IV lasix for ADHF. Patient throughout the hospital course had difficulty taking lasix due to extreme cramping not related to electrolyte de-arrangement. Discussion was had with patient on whether he would rather switch to hospice care patient stated he would rather live with the discomfort associated w/ lasix. At the time of discharge patient was maintaining euvolemia w/ lasix PO 80mg daily.    Home Medications:   amlodipine besylate 5 mg Oral Daily  aspirin 81 mg Oral Daily  atorvastatin calcium 40 mg Oral Daily  cefadroxil 500 mg Oral Every 12 hours  cyclobenzaprine HCl 5 mg Oral 3 times daily PRN  furosemide 80 mg Oral Daily  Insulin detemir 20 units bid  Insulin aspart 18units tid  Keppra 1500mg bid  magnesium oxide 400 mg Oral Daily  milrinone lactate,milrinone lactate/D5W 0.25 mcg/kg/min (23.6 mcg/min) Intravenous Continuous  mirtazapine 15 mg Oral Nightly  pantoprazole sodium 40 mg Oral Daily  potassium chloride 10 MEQ 30 mEq Oral Daily  spironolactone 25 mg Oral Daily  warfarin sodium 3 MG Take 1.5 tablets (4.5mg) orally daily, except 1 tablet (3mg) on Sundays and Fridays        Cardiac Imaging:   Echo (9/2/2023): HM3 5100. AV does not open. IV septum midline. LV sevewrely dilated w/ normal ventricular mass, normal wall thickness, normal wall motion, severely reduced systolic function w/ EF 10-15%, and normal diastolic function. RV normal cavity size w/ normal wall thickness, normal wall motion, and normal systolic function. Mild MR. Mod to severe TR. PA sys pressure 38mmHg. LVIDd 7.11cm.     RHC (10/31/2022): RA 18, PWP 21, CO 3.55, CI 1.6. On mil 0.125 and HM3 5100.     * No surgery found *     Hospital Course: Upon admission, patient was diuresed well w/ IV lasix 80mg tid and switched to his home lasix 80mg daily. During  admission patient also had his picc line replaced due to non-functioning line. Patient to have INR checked on Tuesday. At the time of discharge patient was clinically and hemodynamcally stable. All questions answered. He agreed w/ plan for discharge.     Goals of Care Treatment Preferences:  Code Status: Full Code    Health care agent: Prema Wright  [facility] agent number: [phone]          What is most important right now is to focus on quality of life, even if it means sacrificing a little time.  Accordingly, we have decided that the best plan to meet the patient's goals includes continuing with treatment.      Consults (From admission, onward)          Status Ordering Provider     Inpatient consult to PICC team ([facility])  Once        Provider:  (Not yet assigned)    Completed DANIEL BAIG     Inpatient consult to Registered Dietitian/Nutritionist  Once        Provider:  (Not yet assigned)    Completed JAYJAY CARVAJAL     Inpatient consult to Endocrinology  Once        Provider:  (Not yet assigned)    Completed JAYJAY CARVAJAL     [facility]  [facility]        Provider:  (Not yet assigned)    Completed JAYJAY CARVAJAL            Significant Diagnostic Studies: Labs: BMP:   Recent Labs   Lab 05/25/24  0423 05/25/24  1511 05/25/24  1843 05/26/24  0438   *  --   --  227*   *  --   --  132*   K 4.1 3.5 3.8 4.0     --   --  98   CO2 22*  --   --  25   BUN 22*  --   --  20   CREATININE 1.1  --   --  1.3   CALCIUM 9.2  --   --  9.0   MG 2.0 1.8 1.7 1.8       Pending Diagnostic Studies:       None          Final Active Diagnoses:    Diagnosis Date Noted POA    PRINCIPAL PROBLEM:  Acute on chronic combined systolic and diastolic heart failure [I50.43]  Yes    LVAD (left ventricular assist device) present [Z95.811] 06/30/2022 Not Applicable    Seizure-like activity [R56.9] 07/20/2022 Yes    Hypokalemia [E87.6] 05/21/2024 Yes    Type 2 diabetes mellitus with  hyperglycemia, with long-term current use of insulin [E11.65, Z79.4] 04/21/2024 Not Applicable    HTN (hypertension) [I10] 04/21/2024 Yes    Receiving inotropic medication [Z79.899] 01/03/2024 Yes    Anemia of chronic disease [D63.8] 10/26/2020 Yes      Problems Resolved During this Admission:      Discharged Condition: good    Disposition:     Follow Up:    Patient Instructions:   No discharge procedures on file.  Medications:  Reconciled Home Medications:      Medication List        START taking these medications      cyclobenzaprine 5 MG tablet  Commonly known as: FLEXERIL  Take 1 tablet (5 mg total) by mouth daily as needed for Muscle spasms. To be taken prior to daily lasix dose.            CHANGE how you take these medications      * warfarin 1 MG tablet  Commonly known as: COUMADIN  Take 1.5 tablets (1.5 mg total) by mouth once. for 1 dose  What changed:   medication strength  how much to take  how to take this  when to take this  additional instructions     * warfarin 1 MG tablet  Commonly known as: COUMADIN  Take 3 tablets (3 mg total) by mouth Daily.  Start taking on: May 27, 2024  What changed: You were already taking a medication with the same name, and this prescription was added. Make sure you understand how and when to take each.           * This list has 2 medication(s) that are the same as other medications prescribed for you. Read the directions carefully, and ask your doctor or other care provider to review them with you.                CONTINUE taking these medications      acetaminophen 325 MG tablet  Commonly known as: TYLENOL  Take 2 tablets (650 mg total) by mouth every 6 (six) hours as needed for Pain.     amLODIPine 5 MG tablet  Commonly known as: NORVASC  Take 1 tablet (5 mg total) by mouth once daily.     aspirin 81 MG EC tablet  Commonly known as: ECOTRIN  Take 1 tablet (81 mg total) by mouth once daily.     atorvastatin 40 MG tablet  Commonly known as: LIPITOR  Take 1 tablet (40 mg  "total) by mouth once daily.     BAQSIMI 3 mg/actuation Spry  Generic drug: glucagon  1 each by Nasal route as needed (hypoglycemia).     BD ULTRA-FINE GARTH PEN NEEDLE 32 gauge x 5/32" Ndle  Generic drug: pen needle, diabetic  1 each by Misc.(Non-Drug; Combo Route) route 4 (four) times daily.     blood sugar diagnostic Strp  Use to test blood glucose 4 (four) times daily.     blood-glucose meter Misc  Commonly known as: TRUE METRIX GLUCOSE METER  Use to test blood glucose 4 (four) times daily.     cefadroxil 500 MG Cap  Commonly known as: DURICEF  Take 1 capsule (500 mg total) by mouth every 12 (twelve) hours.     FREESTYLE LUCIUS 3 SENSOR Dee  Generic drug: blood-glucose sensor  1 Device by Misc.(Non-Drug; Combo Route) route every 14 (fourteen) days.     furosemide 80 MG tablet  Commonly known as: LASIX  Take 1 tablet (80 mg total) by mouth once daily.     insulin aspart U-100 100 unit/mL (3 mL) Inpn pen  Commonly known as: NovoLOG  Inject 18 Units into the skin 3 (three) times daily with meals: MAX 94 units/daily  150-200    201-250    251-300    301-350    >350  +2 units   +4 units    +6 units    +8 units    +10 units     lancets 33 gauge Misc  Use to test blood glucose 4 (four) times daily.     LEVEMIR FLEXPEN 100 unit/mL (3 mL) Inpn pen  Generic drug: insulin detemir U-100 (Levemir)  Inject 20 Units into the skin 2 (two) times daily. In the morning and before bed.     levETIRAcetam 1000 MG tablet  Commonly known as: KEPPRA  Take 1.5 tablets (1,500 mg total) by mouth 2 (two) times daily.     magnesium oxide 400 mg (241.3 mg magnesium) tablet  Commonly known as: MAG-OX  Take 1 tablet (400 mg total) by mouth once daily.     milrinone 20mg/100ml D5W (200mcg/ml) 20 mg/100 mL (200 mcg/mL) infusion  Commonly known as: PRIMACOR  Inject 23.6 mcg/min into the vein continuous.     mirtazapine 15 MG tablet  Commonly known as: REMERON  Take 1 tablet (15 mg total) by mouth nightly.     ondansetron 4 MG Tbdl  Commonly known " as: ZOFRAN-ODT  Take 1 tablet (4 mg total) by mouth every 8 (eight) hours as needed (nausea).     pantoprazole 40 MG tablet  Commonly known as: PROTONIX  Take 1 tablet (40 mg total) by mouth once daily.     potassium chloride SA 10 MEQ tablet  Commonly known as: K-DUR,KLOR-CON M  Take 3 tablets (30 mEq total) by mouth once daily.     spironolactone 25 MG tablet  Commonly known as: ALDACTONE  Take 1 tablet (25 mg total) by mouth once daily.              Chantal Herndon MD  Heart Transplant  [facility] - Cardiology Stepdown

## 2024-05-26 NOTE — PROGRESS NOTES
[facility] - [facility] Step[facility]  Heart Transplant  Progress Note    Patient Name: Kevan Queen  MRN: [id number]  Admission Date: 5/20/2024  Hospital Length of Stay: 6 days  Attending Physician: Luca Lopez Jr.*  Primary Care Provider: Rosio Armendariz FNP  Principal Problem:Acute on chronic combined systolic and diastolic heart failure    Subjective:   Interval History: NAEON. No acute complaints. Discharging today.     Continuous Infusions:   milrinone 20mg/100ml D5W (200mcg/ml)  0.25 mcg/kg/min Intravenous Continuous 7.1 mL/hr at 05/25/24 2325 0.25 mcg/kg/min at 05/25/24 2325     Scheduled Meds:   amLODIPine  5 mg Oral Daily    aspirin  81 mg Oral Daily    atorvastatin  40 mg Oral Daily    cefadroxil  500 mg Oral Q12H    insulin aspart U-100  12 Units Subcutaneous TIDWM    insulin glargine U-100  20 Units Subcutaneous BID    levETIRAcetam  1,500 mg Oral BID    magnesium oxide  400 mg Oral Daily    mirtazapine  15 mg Oral Nightly    pantoprazole  40 mg Oral Daily    sodium chloride 0.9%  10 mL Intravenous Q6H    spironolactone  25 mg Oral Daily    warfarin  6 mg Oral Daily     PRN Meds:  Current Facility-Administered Medications:     acetaminophen, 650 mg, Oral, Q6H PRN    cyclobenzaprine, 5 mg, Oral, TID PRN    dextrose 10%, 12.5 g, Intravenous, PRN    dextrose 10%, 25 g, Intravenous, PRN    glucagon (human recombinant), 1 mg, Intramuscular, PRN    glucose, 16 g, Oral, PRN    glucose, 24 g, Oral, PRN    insulin aspart U-100, 0-10 Units, Subcutaneous, QID (AC + HS) PRN    Flushing PICC/Midline Protocol, , , Until Discontinued **AND** sodium chloride 0.9%, 10 mL, Intravenous, Q6H **AND** sodium chloride 0.9%, 10 mL, Intravenous, PRN    Review of patient's allergies indicates:   Allergen Reactions    Bumex [bumetanide] Hives    Torsemide Hives     Objective:     Vital Signs (Most Recent):  Temp: 98.2 °F (36.8 °C) (05/26/24 0812)  Pulse: 107 (05/26/24 0812)  Resp: 18 (05/26/24 0812)  BP: (!) 86/0  (05/26/24 0812)  SpO2: 97 % (05/26/24 0812) Vital Signs (24h Range):  Temp:  [97.7 °F (36.5 °C)-98.5 °F (36.9 °C)] 98.2 °F (36.8 °C)  Pulse:  [107-119] 107  Resp:  [18-19] 18  SpO2:  [97 %-100 %] 97 %  BP: (72-88)/(0) 86/0     Patient Vitals for the past 72 hrs (Last 3 readings):   Weight   05/26/24 0800 76.6 kg (168 lb 14 oz)   05/25/24 0755 73.8 kg (162 lb 11.2 oz)   05/24/24 0718 71.8 kg (158 lb 4.6 oz)     Body mass index is 21.11 kg/m².      Intake/Output Summary (Last 24 hours) at 5/26/2024 0913  Last data filed at 5/26/2024 0800  Gross per 24 hour   Intake 1962.36 ml   Output 4250 ml   Net -2287.64 ml       Hemodynamic Parameters:  CVP:  [16 mmHg] 16 mmHg         Physical Exam  Constitutional:       General: He is not in acute distress.     Appearance: Normal appearance.   HENT:      Head: Normocephalic and atraumatic.   Eyes:      Conjunctiva/sclera: Conjunctivae normal.   Neck:      Vascular: JVD present.      Comments: JVP 13cm of H20  Cardiovascular:      Comments: VAD hum appreciated  Pulmonary:      Breath sounds: Normal breath sounds.      Comments: Clear to auscultation  Abdominal:      Comments: Soft, non-tender, non-distended   Musculoskeletal:      Cervical back: Normal range of motion.   Skin:     General: Skin is warm and dry.      Capillary Refill: Capillary refill takes less than 2 seconds.   Neurological:      General: No focal deficit present.      Mental Status: He is alert and oriented to person, place, and time.   Psychiatric:         Mood and Affect: Mood and affect normal.            Significant Labs:  CBC:  Recent Labs   Lab 05/24/24  0403 05/25/24  0423 05/26/24  0417   WBC 8.12 7.25 8.04   RBC 4.58* 4.24* 4.31*   HGB 7.8* 7.3* 7.3*   HCT 28.6* 27.0* 26.9*    356 363   MCV 62* 64* 62*   MCH 17.0* 17.2* 16.9*   MCHC 27.3* 27.0* 27.1*     BNP:  Recent Labs   Lab 05/20/24  1550 05/22/24  0424 05/24/24  0403   BNP 1,168* 777* 246*     CMP:  Recent Labs   Lab 05/24/24  0403  05/25/24  0423 05/25/24  1511 05/25/24  1843 05/26/24  0438   * 198*  --   --  227*   CALCIUM 9.4 9.2  --   --  9.0   ALBUMIN 2.9* 2.8*  --   --  2.8*   PROT 9.0* 8.5*  --   --  8.6*   * 131*  --   --  132*   K 3.7 4.1 3.5 3.8 4.0   CO2 27 22*  --   --  25   CL 97 100  --   --  98   BUN 20 22*  --   --  20   CREATININE 1.3 1.1  --   --  1.3   ALKPHOS 210* 180*  --   --  183*   ALT 17 15  --   --  16   AST 32 28  --   --  27   BILITOT 1.7* 1.4*  --   --  1.4*      Coagulation:   Recent Labs   Lab 05/24/24  0403 05/25/24  0423 05/26/24  0417   INR 1.5* 1.5* 2.1*   APTT 44.2*  44.2* 43.5* 47.3*     LDH:  Recent Labs   Lab 05/24/24  0403 05/25/24  0423 05/26/24  0417   * 268* 270*     Microbiology:  Microbiology Results (last 7 days)       Procedure Component Value Units Date/Time    Culture, Anaerobe [[id number]] Collected: 05/20/24 1653    Order Status: Completed Specimen: Wound from Abdomen Updated: 05/24/24 1253     Anaerobic Culture No anaerobes isolated    Narrative:      LEXX ALVAREZ    Aerobic culture [[id number]] Collected: 05/20/24 1653    Order Status: Completed Specimen: Wound from Abdomen Updated: 05/23/24 1053     Aerobic Bacterial Culture No growth    Narrative:      LEXX ALVAREZ    Gram stain [[id number]] Collected: 05/20/24 1653    Order Status: Completed Specimen: Wound from Abdomen Updated: 05/20/24 1957     Gram Stain Result No WBC's      No organisms seen    Narrative:      LEXX ALVAREZ            I have reviewed all pertinent labs within the past 24 hours.    Estimated Creatinine Clearance: 82.7 mL/min (based on SCr of 1.3 mg/dL).    Diagnostic Results:  I have reviewed all pertinent imaging results/findings within the past 24 hours.  Assessment and Plan:     Mr. Kevan Thacker is a 39 year old male with stage D 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 Familial CM-Father had LVAD and subsequent heart transplant), polysubstance abuse, DM underwent DT-HM3 implantation 6/23/2022 with early RV failure requiring  RVAD with ProTek Duo after admitted with ADHF/cardiogenic shock on home milrinone requiring IABP. He underwent RVAD removal and chest closure 6/30/2022.  He also completed a course of IV Abx for staph epi. He was weaned off  but he had to restarted due to RVF. He was eventually transitioned to milrinone (secondary to  shortage) now on 0.25 mcg/kg/min. He has a history of unclear syncope vs seizures and is followed by neuro for this and is on Keppra.       On 11/15/23 underwent removal of eroded [employer] scICD--no Lifevest (due to LVAD) and no plan to replace ICD as not requiring therapy post LVAD with concern for reinfection.  He has not had neurology f/u with seizure hx on keppra so coordinator to assist him with obtaining appt.    Patient presented today for routine HTS clinic appointment but was found to hunched over in a chair w/ marked dyspnea. Patient sent directly to the ED for further evaluation. States Picc line stopped working last night. Patient currently endorses chronic SOB w.exertion, but denies any SOB at rest, orthopnea, chest pain, fever, chills, nausea, vomiting, abdominal pain, abd distention, PND. States instead of take Lasix 80mg daily, he was taking Lasix 40mg bid.     Of note, patient was most recently hospitalized from 4/20-4/26 for ADHF and hyperglycemia. patient noted to be hyperglycemia w/ glucose>500 and was started on insulin drip and then switched to basal bolus dosing as per Endocrinology once hyperglycemia resolved. Patient was also started on IV lasix for ADHF. Patient throughout the hospital course had difficulty taking lasix due to extreme cramping not related to electrolyte de-arrangement. Discussion was had with patient on whether he would rather switch to hospice care patient stated he would rather live with the discomfort associated w/ lasix. At the time of discharge patient was maintaining euvolemia w/ lasix PO 80mg daily.    Home Medications:   amlodipine  besylate 5 mg Oral Daily  aspirin 81 mg Oral Daily  atorvastatin calcium 40 mg Oral Daily  cefadroxil 500 mg Oral Every 12 hours  cyclobenzaprine HCl 5 mg Oral 3 times daily PRN  furosemide 80 mg Oral Daily  Insulin detemir 20 units bid  Insulin aspart 18units tid  Keppra 1500mg bid  magnesium oxide 400 mg Oral Daily  milrinone lactate,milrinone lactate/D5W 0.25 mcg/kg/min (23.6 mcg/min) Intravenous Continuous  mirtazapine 15 mg Oral Nightly  pantoprazole sodium 40 mg Oral Daily  potassium chloride 10 MEQ 30 mEq Oral Daily  spironolactone 25 mg Oral Daily  warfarin sodium 3 MG Take 1.5 tablets (4.5mg) orally daily, except 1 tablet (3mg) on Sundays and Fridays        Cardiac Imaging:   Echo (9/2/2023): HM3 5100. AV does not open. IV septum midline. LV sevewrely dilated w/ normal ventricular mass, normal wall thickness, normal wall motion, severely reduced systolic function w/ EF 10-15%, and normal diastolic function. RV normal cavity size w/ normal wall thickness, normal wall motion, and normal systolic function. Mild MR. Mod to severe TR. PA sys pressure 38mmHg. LVIDd 7.11cm.     RHC (10/31/2022): RA 18, PWP 21, CO 3.55, CI 1.6. On mil 0.125 and HM3 5100.     * Acute on chronic combined systolic and diastolic heart failure  -NICM s/p HM 3 on home Milrinone   -Last 2D Echo  9/5/23 : LVEF 10-15%, LVEDD  7.11 cm with speed at 5100  -Last RHC: 10/31/22 with speed at 5100 on Milrinone 0.125 RA: 18, RV: 35/7 (22), PAP: 35/19 (24), PWP: 21, CO: 3.55, CI: 1.6  -Elevated JVP however in the setting of RHF this might be his sweetspot. Continue home lasix 80mg PO daily.   -Continue home milrinone 0.25  -GDMT with N/A  -2g Na dietary restriction, 1500 mL fluid restriction, strict I/Os    LVAD (left ventricular assist device) present  Procedure: Device Interrogation Including analysis of device parameters  Current Settings: Ventricular Assist Device  Review of device function is stable/unstable stable  Anticoagulation w/  coumadin. Goal INR 2-3. S/p 1mg Vit K on 5/21. Currenly therapeutic.         5/26/2024     8:13 AM 5/26/2024     4:30 AM 5/25/2024    11:00 PM 5/25/2024     8:58 PM 5/25/2024     4:47 PM 5/25/2024    12:22 PM 5/25/2024     8:29 AM   TXP LVAD INTERROGATIONS   Type HeartMate3 HeartMate3 HeartMate3 HeartMate3 HeartMate3 HeartMate3 HeartMate3   Flow 4.3 4.4 4 4.5 4.5 4.4 4.1   Speed 5100 5150 5100 5100 5100 5100 5100   PI 5.1 4.4 5.6 4.2 4.2 4.9 5.4   Power (Serna) 3.6 3.6 3.6 3.6 3.7 3.7 3.6   LSL 4700     4700 4700   Pulsatility  Intermittent pulse Intermittent pulse Intermittent pulse            Seizure-like activity  Continue home keppra.     Type 2 diabetes mellitus with hyperglycemia, with long-term current use of insulin  Management as per Endocrinology.     HTN (hypertension)  - continue amlodopine.         Chantal Herndon MD  Heart Transplant  [facility] - [facility] Step[facility]

## 2024-05-26 NOTE — ASSESSMENT & PLAN NOTE
-NICM s/p HM 3 on home Milrinone   -Last 2D Echo  9/5/23 : LVEF 10-15%, LVEDD  7.11 cm with speed at 5100  -Last RHC: 10/31/22 with speed at 5100 on Milrinone 0.125 RA: 18, RV: 35/7 (22), PAP: 35/19 (24), PWP: 21, CO: 3.55, CI: 1.6  -Elevated JVP however in the setting of RHF this might be his sweetspot. Continue home lasix 80mg PO daily.   -Continue home milrinone 0.25  -GDMT with N/A  -2g Na dietary restriction, 1500 mL fluid restriction, strict I/Os

## 2024-05-26 NOTE — PROGRESS NOTES
"   05/25/24 2207        VAD 06/29/22 1115 Left ventricular assist device HeartMate 3   Placement Date/Time: 06/29/22 1115   Present Prior to Hospital Arrival?: No  Inserted by: MD  VAD Type: Left ventricular assist device  VAD Brand: HeartMate 3   Site Location Abdomen right   Site Assessment Intact;Dry;Clean   Driveline Exit Site 1   Driveline Assessment Free of Kinks;Intact   Dressing Status Intact;Dry;Clean   Dressing Intervention Sterile dressing change   Performed By RN   Dressing Change Schedule Daily   Dressing Change Due 05/26/24   Integrity intact;dry   Driveline [place] in use [place] askew   Condition CDI   Date changed 05/25/24     LVAD dressing change completed using sterile technique with kit. DLES is a "1" with no drainage/crusted drainage noted on the drain sponge. Tolerated without any complication. No redness, or tenderness noted.   "

## 2024-05-26 NOTE — DISCHARGE SUMMARY
[facility] - [facility] Step[facility]  Heart Transplant  Discharge Summary      Patient Name: Kevan Queen  MRN: [id number]  Admission Date: 5/20/2024  Hospital Length of Stay: 6 days  Discharge Date and Time: 05/26/2024 1:40 PM  Attending Physician: Luca Lopez Jr.*   Discharging Provider: Chantal Herndon MD  Primary Care Provider: Rosio Armendariz FNP     HPI: Mr. 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 He underwent RVAD removal and chest closure 6/30/2022.  He also completed a course of IV Abx for staph epi. He was weaned off  but he had to restarted due to RVF. He was eventually transitioned to milrinone (secondary to  shortage) now on 0.25 mcg/kg/min. He has a history of unclear syncope vs seizures and is followed by neuro for this and is on Keppra.       On 11/15/23 underwent removal of eroded [employer] scICD--no Lifevest (due to LVAD) and no plan to replace ICD as not requiring therapy post LVAD with concern for reinfection.  He has not had neurology f/u with seizure hx on keppra so coordinator to assist him with obtaining appt.    Patient presented today for routine HTS clinic appointment but was found to hunched over in a chair w/ marked dyspnea. Patient sent directly to the ED for further evaluation. States Picc line stopped working last night. Patient currently endorses chronic SOB w.exertion, but denies any SOB at rest, orthopnea, chest pain, fever, chills, nausea, vomiting, abdominal pain, abd distention, PND. States instead of take Lasix 80mg daily, he was taking Lasix 40mg bid.     Of note, patient was most recently hospitalized from 4/20-4/26 for ADHF and hyperglycemia. patient noted to be  hyperglycemia w/ glucose>500 and was started on insulin drip and then switched to basal bolus dosing as per Endocrinology once hyperglycemia resolved. Patient was also started on IV lasix for ADHF. Patient throughout the hospital course had difficulty taking lasix due to extreme cramping not related to electrolyte de-arrangement. Discussion was had with patient on whether he would rather switch to hospice care patient stated he would rather live with the discomfort associated w/ lasix. At the time of discharge patient was maintaining euvolemia w/ lasix PO 80mg daily.    Home Medications:   amlodipine besylate 5 mg Oral Daily  aspirin 81 mg Oral Daily  atorvastatin calcium 40 mg Oral Daily  cefadroxil 500 mg Oral Every 12 hours  cyclobenzaprine HCl 5 mg Oral 3 times daily PRN  furosemide 80 mg Oral Daily  Insulin detemir 20 units bid  Insulin aspart 18units tid  Keppra 1500mg bid  magnesium oxide 400 mg Oral Daily  milrinone lactate,milrinone lactate/D5W 0.25 mcg/kg/min (23.6 mcg/min) Intravenous Continuous  mirtazapine 15 mg Oral Nightly  pantoprazole sodium 40 mg Oral Daily  potassium chloride 10 MEQ 30 mEq Oral Daily  spironolactone 25 mg Oral Daily  warfarin sodium 3 MG Take 1.5 tablets (4.5mg) orally daily, except 1 tablet (3mg) on Sundays and Fridays        Cardiac Imaging:   Echo (9/2/2023): HM3 5100. AV does not open. IV septum midline. LV sevewrely dilated w/ normal ventricular mass, normal wall thickness, normal wall motion, severely reduced systolic function w/ EF 10-15%, and normal diastolic function. RV normal cavity size w/ normal wall thickness, normal wall motion, and normal systolic function. Mild MR. Mod to severe TR. PA sys pressure 38mmHg. LVIDd 7.11cm.     RHC (10/31/2022): RA 18, PWP 21, CO 3.55, CI 1.6. On mil 0.125 and HM3 5100.     * No surgery found *     Hospital Course: Upon admission, patient was diuresed well w/ IV lasix 80mg tid and switched to his home lasix 80mg daily. During  admission patient also had his picc line replaced due to non-functioning line. Patient to have INR checked on Tuesday. At the time of discharge patient was clinically and hemodynamcally stable. All questions answered. He agreed w/ plan for discharge.     Goals of Care Treatment Preferences:  Code Status: Full Code    Health care agent: Prema Wright  [facility] agent number: [phone]          What is most important right now is to focus on quality of life, even if it means sacrificing a little time.  Accordingly, we have decided that the best plan to meet the patient's goals includes continuing with treatment.      Consults (From admission, onward)          Status Ordering Provider     Inpatient consult to PICC team ([facility])  Once        Provider:  (Not yet assigned)    Completed DANIEL BAIG     Inpatient consult to Registered Dietitian/Nutritionist  Once        Provider:  (Not yet assigned)    Completed JAYJAY CARVAJAL     Inpatient consult to Endocrinology  Once        Provider:  (Not yet assigned)    Completed JAYJAY CARVAJAL     [facility]  [facility]        Provider:  (Not yet assigned)    Completed JAYJAY CARVAJAL            Significant Diagnostic Studies: Labs: BMP:   Recent Labs   Lab 05/25/24  0423 05/25/24  1511 05/25/24  1843 05/26/24  0438   *  --   --  227*   *  --   --  132*   K 4.1 3.5 3.8 4.0     --   --  98   CO2 22*  --   --  25   BUN 22*  --   --  20   CREATININE 1.1  --   --  1.3   CALCIUM 9.2  --   --  9.0   MG 2.0 1.8 1.7 1.8       Pending Diagnostic Studies:       None          Final Active Diagnoses:    Diagnosis Date Noted POA    PRINCIPAL PROBLEM:  Acute on chronic combined systolic and diastolic heart failure [I50.43]  Yes    LVAD (left ventricular assist device) present [Z95.811] 06/30/2022 Not Applicable    Seizure-like activity [R56.9] 07/20/2022 Yes    Hypokalemia [E87.6] 05/21/2024 Yes    Type 2 diabetes mellitus with  "hyperglycemia, with long-term current use of insulin [E11.65, Z79.4] 04/21/2024 Not Applicable    HTN (hypertension) [I10] 04/21/2024 Yes    Receiving inotropic medication [Z79.899] 01/03/2024 Yes    Anemia of chronic disease [D63.8] 10/26/2020 Yes      Problems Resolved During this Admission:      Discharged Condition: good    Disposition:     Follow Up:    Patient Instructions:   No discharge procedures on file.  Medications:  Reconciled Home Medications:      Medication List        START taking these medications      cyclobenzaprine 5 MG tablet  Commonly known as: FLEXERIL  Take 1 tablet (5 mg total) by mouth daily as needed for Muscle spasms. To be taken prior to daily lasix dose.            CHANGE how you take these medications      warfarin 3 MG tablet  Commonly known as: COUMADIN  Take 1 tablet (3 mg total) by mouth Daily. Starting 5/27/2024.  Start taking on: May 27, 2024  What changed:   how much to take  how to take this  when to take this  additional instructions            CONTINUE taking these medications      acetaminophen 325 MG tablet  Commonly known as: TYLENOL  Take 2 tablets (650 mg total) by mouth every 6 (six) hours as needed for Pain.     amLODIPine 5 MG tablet  Commonly known as: NORVASC  Take 1 tablet (5 mg total) by mouth once daily.     aspirin 81 MG EC tablet  Commonly known as: ECOTRIN  Take 1 tablet (81 mg total) by mouth once daily.     atorvastatin 40 MG tablet  Commonly known as: LIPITOR  Take 1 tablet (40 mg total) by mouth once daily.     BAQSIMI 3 mg/actuation Spry  Generic drug: glucagon  1 each by Nasal route as needed (hypoglycemia).     BD ULTRA-FINE GARTH PEN NEEDLE 32 gauge x 5/32" Ndle  Generic drug: pen needle, diabetic  1 each by Misc.(Non-Drug; Combo Route) route 4 (four) times daily.     blood sugar diagnostic Strp  Use to test blood glucose 4 (four) times daily.     blood-glucose meter Misc  Commonly known as: TRUE METRIX GLUCOSE METER  Use to test blood glucose 4 (four) " times daily.     cefadroxil 500 MG Cap  Commonly known as: DURICEF  Take 1 capsule (500 mg total) by mouth every 12 (twelve) hours.     FREESTYLE LUCIUS 3 SENSOR Dee  Generic drug: blood-glucose sensor  1 Device by Misc.(Non-Drug; Combo Route) route every 14 (fourteen) days.     furosemide 80 MG tablet  Commonly known as: LASIX  Take 1 tablet (80 mg total) by mouth once daily.     insulin aspart U-100 100 unit/mL (3 mL) Inpn pen  Commonly known as: NovoLOG  Inject 18 Units into the skin 3 (three) times daily with meals: MAX 94 units/daily  150-200    201-250    251-300    301-350    >350  +2 units   +4 units    +6 units    +8 units    +10 units     lancets 33 gauge Misc  Use to test blood glucose 4 (four) times daily.     LEVEMIR FLEXPEN 100 unit/mL (3 mL) Inpn pen  Generic drug: insulin detemir U-100 (Levemir)  Inject 20 Units into the skin 2 (two) times daily. In the morning and before bed.     levETIRAcetam 1000 MG tablet  Commonly known as: KEPPRA  Take 1.5 tablets (1,500 mg total) by mouth 2 (two) times daily.     magnesium oxide 400 mg (241.3 mg magnesium) tablet  Commonly known as: MAG-OX  Take 1 tablet (400 mg total) by mouth once daily.     milrinone 20mg/100ml D5W (200mcg/ml) 20 mg/100 mL (200 mcg/mL) infusion  Commonly known as: PRIMACOR  Inject 23.6 mcg/min into the vein continuous.     mirtazapine 15 MG tablet  Commonly known as: REMERON  Take 1 tablet (15 mg total) by mouth nightly.     ondansetron 4 MG Tbdl  Commonly known as: ZOFRAN-ODT  Take 1 tablet (4 mg total) by mouth every 8 (eight) hours as needed (nausea).     pantoprazole 40 MG tablet  Commonly known as: PROTONIX  Take 1 tablet (40 mg total) by mouth once daily.     potassium chloride SA 10 MEQ tablet  Commonly known as: K-DUR,KLOR-CON M  Take 3 tablets (30 mEq total) by mouth once daily.     spironolactone 25 MG tablet  Commonly known as: ALDACTONE  Take 1 tablet (25 mg total) by mouth once daily.              Chantal Herndon MD  Heart  Transplant  [facility] - Cardiology Stepdown

## 2024-05-26 NOTE — PLAN OF CARE
VSS on RA. K & Mg replaced per order   Pt refused to be on LVAD wall power, remained on batteries ovn & said that's what he does at home. Educated pt should use MPU at home & wall power in the hospital when sleeping, pt verbalized understanding. HTS aware  Pt was very hungry ovn. RN told pt he will exceed his sodium limit, and pt still wants food. Cereal/sandwich/meals provided, BG monitored  Heparin & milrinone gtt per order     Problem: Adult Inpatient Plan of Care  Goal: Absence of Hospital-Acquired Illness or Injury  Outcome: Progressing     Problem: Diabetes Comorbidity  Goal: Blood Glucose Level Within Targeted Range  Outcome: Progressing     Problem: Infection  Goal: Absence of Infection Signs and Symptoms  Outcome: Progressing     Problem: Ventricular Assist Device  Goal: Optimal Adjustment to Device  Outcome: Progressing

## 2024-05-27 ENCOUNTER — ANTI-COAG VISIT (OUTPATIENT)
Dept: CARDIOLOGY | Facility: CLINIC | Age: 39
End: 2024-05-27
Payer: MEDICAID

## 2024-05-27 ENCOUNTER — TELEPHONE (OUTPATIENT)
Dept: TRANSPLANT | Facility: CLINIC | Age: 39
End: 2024-05-27
Payer: MEDICAID

## 2024-05-27 DIAGNOSIS — Z95.811 LVAD (LEFT VENTRICULAR ASSIST DEVICE) PRESENT: Primary | ICD-10-CM

## 2024-05-27 PROCEDURE — 93793 ANTICOAG MGMT PT WARFARIN: CPT | Mod: ,,,

## 2024-05-27 NOTE — TELEPHONE ENCOUNTER
Pt on inotropes, weekly labs were not included on home health orders.  I called [facility] [phone], fax [phone], spoke with Sisi.  She said they have not received a referral for this pt.     Message sent to social work team to let them know and to ask them to resend orders.  Once done, we can call and add labs on for weekly labs.

## 2024-05-27 NOTE — TELEPHONE ENCOUNTER
Paged by CSU RN asking for help getting in touch with . The patient's home dobutamine is there and connected, but [facility]  Rn reports her name is Dinora.  I explained I know an Dinora PAUL , that may be the person.  can try to page her and if not to page the supervisor, Jessica.  No other suggestions at that time.

## 2024-05-27 NOTE — PROGRESS NOTES
Hospital Course: Upon admission, patient was diuresed well w/ IV lasix 80mg tid and switched to his home lasix 80mg daily. During admission patient also had his picc line replaced due to non-functioning line. Patient to have INR checked on Tuesday. At the time of discharge patient was clinically and hemodynamcally stable. All questions answered. He agreed w/ plan for discharge

## 2024-05-27 NOTE — PLAN OF CARE
Received call from  Savita RN ([id number]) Requesting assistance arranging pt transportation.(Pt is an LVAD pt) Savita stated as per secure chat , the transplant sw was to arrange transportation, but the  did not have the correct contact number for the transplant sw.  Della informed Savita that sw will need to get clearance due to the nature of pt's condition and to ensure the appropriate  transportation is arranged.     Della reached out Alisia, to advise. As per Alisia , the transport team  must arrange transportation to ensure pt receives the appropriate transportation, ED sw is unable to assist.   Della reached out to Savita and advised she reach out to the LVAD coordinator for assistance, as ED sw is unable to assist.         Norman Bocanegra LMSW  Case Management  Emergency Department  [phone]

## 2024-05-27 NOTE — PROGRESS NOTES
Pt's wife reports discharge dose provided by hospital was 3mg everyday. He will have INR drawn 5/28.

## 2024-05-27 NOTE — PROGRESS NOTES
On Call SW Note:    SW received a call from pts bedside nurse Savita ([id number]) patient now needs a ride home due to home IV milrinone arriving so late in the evening.  Pts s/o now no longer able to pick him up for discharge to home.  Due to time, unable to arrange medicaid transportation.  Per Savita, patient is eager to discharge to home.   SW put in orders for [facility] to  patient within the hour, providing patients nurse phone as contact.   SW spoke with pts nurse Savita again, who will update the patient.  This SW provided on call transplant SW phone contact as well.  SW will follow-up on transportation order within the hour to provide an update to patient and patients nurse.   On Call SW remains available.

## 2024-05-29 ENCOUNTER — SOCIAL WORK (OUTPATIENT)
Dept: TRANSPLANT | Facility: CLINIC | Age: 39
End: 2024-05-29
Payer: MEDICAID

## 2024-05-29 ENCOUNTER — PATIENT MESSAGE (OUTPATIENT)
Dept: TRANSPLANT | Facility: CLINIC | Age: 39
End: 2024-05-29
Payer: MEDICAID

## 2024-05-29 ENCOUNTER — OFFICE VISIT (OUTPATIENT)
Dept: PALLIATIVE MEDICINE | Facility: CLINIC | Age: 39
End: 2024-05-29
Payer: MEDICAID

## 2024-05-29 DIAGNOSIS — F41.9 ANXIETY: ICD-10-CM

## 2024-05-29 DIAGNOSIS — Z95.811 LVAD (LEFT VENTRICULAR ASSIST DEVICE) PRESENT: ICD-10-CM

## 2024-05-29 DIAGNOSIS — R52 PAIN: ICD-10-CM

## 2024-05-29 DIAGNOSIS — I50.84 CHF (NYHA CLASS IV, ACC/AHA STAGE D): Primary | ICD-10-CM

## 2024-05-29 DIAGNOSIS — Z51.5 PALLIATIVE CARE ENCOUNTER: ICD-10-CM

## 2024-05-29 LAB — INR PPP: 3.01

## 2024-05-29 PROCEDURE — 1111F DSCHRG MED/CURRENT MED MERGE: CPT | Mod: CPTII,95,, | Performed by: STUDENT IN AN ORGANIZED HEALTH CARE EDUCATION/TRAINING PROGRAM

## 2024-05-29 PROCEDURE — 99205 OFFICE O/P NEW HI 60 MIN: CPT | Mod: 95,,, | Performed by: STUDENT IN AN ORGANIZED HEALTH CARE EDUCATION/TRAINING PROGRAM

## 2024-05-29 PROCEDURE — 99497 ADVNCD CARE PLAN 30 MIN: CPT | Mod: 95,,, | Performed by: STUDENT IN AN ORGANIZED HEALTH CARE EDUCATION/TRAINING PROGRAM

## 2024-05-29 PROCEDURE — 3046F HEMOGLOBIN A1C LEVEL >9.0%: CPT | Mod: CPTII,95,, | Performed by: STUDENT IN AN ORGANIZED HEALTH CARE EDUCATION/TRAINING PROGRAM

## 2024-05-29 RX ORDER — GABAPENTIN 100 MG/1
CAPSULE ORAL
Qty: 180 CAPSULE | Refills: 11 | Status: SHIPPED | OUTPATIENT
Start: 2024-05-29 | End: 2025-05-29

## 2024-05-29 RX ORDER — DULOXETIN HYDROCHLORIDE 30 MG/1
30 CAPSULE, DELAYED RELEASE ORAL DAILY
Qty: 30 CAPSULE | Refills: 11 | Status: SHIPPED | OUTPATIENT
Start: 2024-05-29 | End: 2025-05-29

## 2024-05-29 NOTE — PROGRESS NOTES
Palliative Medicine Clinic Note        Consult Requested By: Samia Bell      Reason for Consult: ACP and sx management in the setting of HF s/p DT LVAD    Chief Complaint: No chief complaint on file.          ASSESSMENT/PLAN:      Plan/Recommendations:    Diagnoses and all orders for this visit:    CHF (NYHA class IV, ACC/AHA stage D) / LVAD (left ventricular assist device) present  - stage D 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 Familial CM-Father had LVAD and subsequent heart transplant), polysubstance abuse, DM underwent DT-HM3 implantation 6/23/2022 with early RV failure requiring RVAD (removed 6/2022)   -on home milrinone.   -Has limited ability to move around   - Encouraged the patient to maintain a healthy diet and lifestyle, including regular movement.  - Instructed the patient to monitor his fluid levels by weighing himself daily.  - Emphasized the importance of regular communication with his LVAD nurses and the medical team about any changes in his condition.  - Educated the patient about the importance of adhering to his prescribed medication regimen.      Pain  -     gabapentin (NEURONTIN) 100 MG capsule; Take 1 capsule (100 mg total) by mouth 2 (two) times daily AND 4 capsules (400 mg total) every evening.  -     DULoxetine (CYMBALTA) 30 MG capsule; Take 1 capsule (30 mg total) by mouth once daily.  - Increased the patient's gabapentin dosage from 100mg to 400mg at night and maintained 100mg during the day to manage pain and improve sleep.  - Introduced Cymbalta at a starting dose of 30mg per day to medication regimen for anxiety and pain management.  - Advised the patient to take Flexeril as needed for cramping.      Anxiety  -     DULoxetine (CYMBALTA) 30 MG capsule; Take 1 capsule (30 mg total) by mouth once daily.      Palliative care encounter    Advance Care Planning   Advance Directives:   Living Will: No    [place]POST: No    Do Not Resuscitate Status: No    Medical Power of : Yes    Agent's Name:   "Darlyn Jackson (Paige)uton   Agent's Contact Number:      Decision Making:  Patient answered questions  Goals of Care: What is most important right now is to focus on symptom/pain control, extending life as long as possible, even it it means sacrificing quality. Accordingly, we have decided that the best plan to meet the patient's goals includes continuing with treatment.    Date: 05/31/2024    I engaged the patient in a voluntary conversation about advance care planning and we specifically addressed what the goals of care would be moving forward, in light of the patient's change in clinical status, specifically Stage D HF s/p DT LVAD on Milrinone. The patient's overall goal is surviving and "being here" for his seven children, who range in age from 4 to 19 years old. The patient expressed, "I'm not going anywhere". However, he also acknowledged the seriousness of his condition and the uncertainty of his prognosis, stating, "It could be today, tomorrow. That's how bad my condition is." The patient has designated his significant other, Robyn, to make medical decisions for him. HCPOA is on file. The patient has previously expressed wishes to be resuscitated and placed on machines if needed. However, he clarified during this discussion that he would not want this for a prolonged period of time if it meant he would be in a vegetative state.                Follow up:   - Scheduled a virtual visit in one month to assess the effectiveness of the new medication regimen and make necessary adjustments.  - Informed the patient about the potential side effects of his new medications and urged him to report any side effects to the medical team.     Plan discussed with: HF team        SUBJECTIVE:      History of Present Illness / Interval History:  Kevan Queen is 39 y.o. male with stage D 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 Familial CM-Father had LVAD and subsequent heart transplant), polysubstance abuse, DM underwent " "DT-HM3 implantation 6/23/2022 with early RV failure requiring RVAD (removed 6/2022) on home milrinone. He has a history of syncope vs seizures on Keppra.    Presents to Palliative Care Clinic for physical symptoms, advance care planning,, and additional support.. Please see Cardiology note for more details on HF       5/29/24  History obtained from: patient     The patient is a 37-year-old male with an LVAD for approximately two years, presenting for evaluation of his current health status and concerns. He expresses dissatisfaction with the LVAD, stating, "I'm thankful I'm alive but I don't like it." The patient's primary concern is his lung condition, which he believes has worsened due to an illness he contracted. He describes stabbing and cramping pain in his left lung, specifically at the tip or bottom field, which he rates as a 20 out of 10 in severity and affects his ability to move at times. The patient manages the pain with Tylenol, although it provides minimal relief. He also reports shortness of breath, rating it as a 5 out of 10, which he attributes to fluid buildup. The patient experiences anxiety, rating it as an 8 out of 10, and significant insomnia, rating it as a 10 out of 10, despite taking mirtazapine. He denies feeling depressed, stating, "I'm always a happy person." However, he acknowledges that his condition has limited his ability to engage in activities he once enjoyed, such as charity on his [employer] or [employer]. The patient's wife assists him with daily tasks, such as showering and dressing, which he finds discouraging. He spends most of his days sitting or lying down, with occasional periods of feeling well enough to move around. He reports a history of cramping as a side effect of taking 80 mg of Lasix, which he found challenging to tolerate. The patient's primary goal is to survive and be present for his seven children, whose ages range from 4 to 12 years old. He expresses uncertainty about his " "prognosis, stating, "I feel as though I'm not going anywhere, but you never know." The patient denies experiencing chest pain or fever associated with his lung pain and shortness of breath.    ROS:  Review of Systems    Review of Symptoms      Symptom Assessment (ESAS 0-10 Scale)  Pain:  9  Dyspnea:  5  Anxiety:  8  Nausea:  0  Depression:  3  Anorexia:  0  Fatigue:  2  Insomnia:  10  Restlessness:  0  Agitation:  0     CAM / Delirium:  Negative  Constipation:  Negative  Diarrhea:  Negative      Pain Assessment:    Location(s): torso    Torso       Location: left and anterior        Quality: cramping and stabbing        Chronicity: Onset 1 year(s) ago, gradually worsening        Aggravating Factors: recumbency (sleeping on L side)        Alleviating Factors: none        Associated Symptoms: None    Performance Status:  60    Psychosocial/Cultural:   See Palliative Psychosocial Note: Yes  , has 7 children ages 19, 17 12,11, 10, 6,4. The patient lives at home with his significant other, Robyn, who does "basically everything" to help care for him.   **Primary  to Follow**  Palliative Care  Consult: Yes    Spiritual:  F - Margie and Belief:  Oriental orthodox         Medications:    Current Outpatient Medications:     acetaminophen (TYLENOL) 325 MG tablet, Take 2 tablets (650 mg total) by mouth every 6 (six) hours as needed for Pain., Disp: , Rfl: 0    amLODIPine (NORVASC) 5 MG tablet, Take 1 tablet (5 mg total) by mouth once daily., Disp: 90 tablet, Rfl: 3    aspirin (ECOTRIN) 81 MG EC tablet, Take 1 tablet (81 mg total) by mouth once daily., Disp: 90 tablet, Rfl: 3    atorvastatin (LIPITOR) 40 MG tablet, Take 1 tablet (40 mg total) by mouth once daily., Disp: 90 tablet, Rfl: 3    blood sugar diagnostic Strp, Use to test blood glucose 4 (four) times daily., Disp: 200 each, Rfl: 5    blood-glucose meter (TRUE METRIX GLUCOSE METER) Misc, Use to test blood glucose 4 (four) times daily., Disp: 1 each, " Rfl: 0    blood-glucose sensor (FREESTYLE LUCIUS 3 SENSOR) Dee, 1 Device by Misc.(Non-Drug; Combo Route) route every 14 (fourteen) days., Disp: 3 each, Rfl: 9    cefadroxil (DURICEF) 500 MG Cap, Take 1 capsule (500 mg total) by mouth every 12 (twelve) hours., Disp: 60 capsule, Rfl: 11    cyclobenzaprine (FLEXERIL) 5 MG tablet, Take 1 tablet (5 mg total) by mouth daily as needed for Muscle spasms. To be taken prior to daily lasix dose., Disp: 30 tablet, Rfl: 3    DULoxetine (CYMBALTA) 30 MG capsule, Take 1 capsule (30 mg total) by mouth once daily., Disp: 30 capsule, Rfl: 11    furosemide (LASIX) 80 MG tablet, Take 1 tablet (80 mg total) by mouth once daily., Disp: 30 tablet, Rfl: 11    gabapentin (NEURONTIN) 100 MG capsule, Take 1 capsule (100 mg total) by mouth 2 (two) times daily AND 4 capsules (400 mg total) every evening., Disp: 180 capsule, Rfl: 11    glucagon (BAQSIMI) 3 mg/actuation Spry, 1 each by Nasal route as needed (hypoglycemia)., Disp: 2 each, Rfl: 3    insulin aspart U-100 (NOVOLOG) 100 unit/mL (3 mL) InPn pen, Inject 18 Units into the skin 3 (three) times daily with meals: MAX 94 units/daily 150-200    201-250    251-300    301-350    >350 +2 units   +4 units    +6 units    +8 units    +10 units, Disp: 12 mL, Rfl: 5    insulin detemir U-100, Levemir, 100 unit/mL (3 mL) SubQ InPn pen, Inject 20 Units into the skin 2 (two) times daily. In the morning and before bed., Disp: 6 mL, Rfl: 5    lancets 33 gauge Misc, Use to test blood glucose 4 (four) times daily., Disp: 200 each, Rfl: 3    levETIRAcetam (KEPPRA) 1000 MG tablet, Take 1.5 tablets (1,500 mg total) by mouth 2 (two) times daily., Disp: 60 tablet, Rfl: 11    magnesium oxide (MAG-OX) 400 mg (241.3 mg magnesium) tablet, Take 1 tablet (400 mg total) by mouth once daily., Disp: 30 tablet, Rfl: 11    milrinone 20mg/100ml D5W, 200mcg/ml, (PRIMACOR) 20 mg/100 mL (200 mcg/mL) infusion, Inject 23.6 mcg/min into the vein continuous., Disp: , Rfl:      "mirtazapine (REMERON) 15 MG tablet, Take 1 tablet (15 mg total) by mouth nightly., Disp: 30 tablet, Rfl: 11    ondansetron (ZOFRAN-ODT) 4 MG TbDL, Take 1 tablet (4 mg total) by mouth every 8 (eight) hours as needed (nausea)., Disp: 10 tablet, Rfl: 0    pantoprazole (PROTONIX) 40 MG tablet, Take 1 tablet (40 mg total) by mouth once daily., Disp: 30 tablet, Rfl: 11    pen needle, diabetic 32 gauge x 5/32" Ndle, 1 each by Misc.(Non-Drug; Combo Route) route 4 (four) times daily., Disp: 200 each, Rfl: 11    potassium chloride SA (K-DUR,KLOR-CON M) 10 MEQ tablet, Take 3 tablets (30 mEq total) by mouth once daily., Disp: 90 tablet, Rfl: 3    spironolactone (ALDACTONE) 25 MG tablet, Take 1 tablet (25 mg total) by mouth once daily., Disp: 30 tablet, Rfl: 11    warfarin (COUMADIN) 3 MG tablet, Take 1 tablet (3 mg total) by mouth Daily. Starting 5/27/2024., Disp: 30 tablet, Rfl: 5      External  database queried on 05/31/2024  by Jane AMADOR :  05/13/2023 07/27/2022 2 Gabapentin 100 Mg Capsule 90.00 30 [facility] [id number] [facility] ([id number]) 8  [place]icaid [place]   04/02/2023 07/27/2022 2 Gabapentin 100 Mg Capsule 90.00 30 [facility] [id number] [facility] ([id number]) 7  [place]   03/07/2023 03/07/2023 2 Hydrocodone-Acetamin 5-325 Mg 30.00 10 [facility] [id number] [facility] ([id number]) 0 15.00 MME Medicaid [place]       Review of patient's allergies indicates:   Allergen Reactions    Bumex [bumetanide] Hives    Torsemide Hives           OBJECTIVE:         Physical Exam:  Vitals:      Physical Exam  Constitutional:       General: He is not in acute distress.  HENT:      Head: Normocephalic and atraumatic.   Eyes:      General: No scleral icterus.  Pulmonary:      Effort: Pulmonary effort is normal. No respiratory distress.   Musculoskeletal:      Cervical back: Neck supple.   Neurological:      Mental Status: He is alert and oriented to person, place, and time.   Psychiatric:         Mood and Affect: Mood and affect normal.           Labs:5/26 cr 1.3 alb " "2.8      Imaging: CXR 5/20/24: "blunting of the left costophrenic angle. Sternotomy wires and LVAD unchanged. No acute osseous process seen. "   CT 4/24 " Enlarged heterogeneous liver, similar to prior and possibly related to congestive hepatopathy "           Additional 17 min time spent on a voluntary advance care planning and /or goals of care discussion, providing emotional support, formulating and communicating prognosis and exploring burden/benefit of various approaches of treatment.       Jane Ash MD    "

## 2024-05-29 NOTE — PROGRESS NOTES
Discharge Follow-up    Due to pt's Dobutamine has been shipping from [facility] [place] [facility], pt has had multiple delayed discharges. Pt's Dobutamine was not delivered until 10pm on Sunday 5/26 and pt was picked up around midnight by transportation. SW spoke with [facility] and requested that going forward, pt's medication will ship and be delivered from [facility]. This change will go into effect at pt's next admission. Pt does not have a home health provider.    [facility]  [phone], fax [phone].

## 2024-05-30 ENCOUNTER — TELEPHONE (OUTPATIENT)
Dept: TRANSPLANT | Facility: CLINIC | Age: 39
End: 2024-05-30
Payer: MEDICAID

## 2024-05-30 ENCOUNTER — ANTI-COAG VISIT (OUTPATIENT)
Dept: CARDIOLOGY | Facility: CLINIC | Age: 39
End: 2024-05-30
Payer: MEDICAID

## 2024-05-30 DIAGNOSIS — Z95.811 LVAD (LEFT VENTRICULAR ASSIST DEVICE) PRESENT: Primary | ICD-10-CM

## 2024-05-30 PROCEDURE — 93793 ANTICOAG MGMT PT WARFARIN: CPT | Mod: ,,,

## 2024-05-30 NOTE — TELEPHONE ENCOUNTER
Contacted [facility] to obtain lab results . Patient's wife  told lab patient is only to get pt/inr drawn.  contacted patient and informed both patient and spouse to inform them they have standing order for labs cbc, cmp,bnp. Patient's wife stated that she was unaware that there was a standing order for other labs.     Patient will get labs drawn with next pt/inr .     VAD coordinator made aware.

## 2024-05-30 NOTE — TELEPHONE ENCOUNTER
Fannie Fleming Staff  Caller: [phone] (Today, 10:48 AM)  Louisa from [facility] is calling to speak with someone in provider office in regards to reporting a critical weight loss for pt was 180lbs last week this week 160lbs Ronniritika is asking for a return call please call her at  [phone]    Spoke with Miguelina. She wanted to let us know he lost 20 lbs in 1 week. BP was 99/57. She is concerned about changing his milrinone dose as it would drop his BP.  I asked if she could call them and ask them to go in to confirm the weight at [facility]. Miguelina said she will call them and ask them to come in.

## 2024-05-30 NOTE — TELEPHONE ENCOUNTER
----- Message from Cony Mahoney RN sent at 5/29/2024 12:25 PM CDT -----  Regarding: Weekly Dobutamine Labs-CBC, CMP, BNP, Mg  ----- Message -----   From: Jelly Hummel LPN   Sent: 12/13/2023   1:56 PM CDT   To: [facility] Lvad Clinical   Subject: Weekly milrinone labs--link with pt/inr appo#     [facility] MIRIAM Landa Drawstation (may/may not received results same day) // [facility]  [facility] (Ph) [phone] (Fx) [phone] // Every monday labs  [facility] ph [phone] f- [phone]     NEW ORDERS FAXED TO [facility] AS PT APPEARS TO BE VISITING THIS PARTICULAR FACILITY TO GET LABS DRAWN   ----- Message -----   From: Jelly Hummel LPN   Sent: 11/17/2022  10:05 AM CST   To: [facility] Lvad Clinical   Subject: Weekly milrinone labs--link with pt/inr appo#     [facility] MIRIAM Landa Drawstation (may/may not received results same day) / [facility] ph [phone]   ----- Message -----   From: Inessa Arana RN   Sent: 9/22/2022   8:42 AM CST   To: [facility] Lvad Clinical   Subject: Weekly milrinone labs                             [facility] ph [phone]

## 2024-06-04 ENCOUNTER — PATIENT MESSAGE (OUTPATIENT)
Dept: CARDIOTHORACIC SURGERY | Facility: CLINIC | Age: 39
End: 2024-06-04
Payer: MEDICAID

## 2024-06-04 DIAGNOSIS — Z95.811 LVAD (LEFT VENTRICULAR ASSIST DEVICE) PRESENT: Primary | ICD-10-CM

## 2024-06-04 NOTE — PROGRESS NOTES
24 Wife called to report Patient went to [facility] for lab but states was not drawn due to standing lab order being , rescheduled   Lab draw to tomorrow , new order was generated to be faxed

## 2024-06-06 ENCOUNTER — DOCUMENTATION ONLY (OUTPATIENT)
Dept: TRANSPLANT | Facility: CLINIC | Age: 39
End: 2024-06-06
Payer: MEDICAID

## 2024-06-12 ENCOUNTER — TELEPHONE (OUTPATIENT)
Dept: TRANSPLANT | Facility: CLINIC | Age: 39
End: 2024-06-12
Payer: MEDICAID

## 2024-06-12 NOTE — TELEPHONE ENCOUNTER
Called lab in regards to weekly lab work to see if patient has had it done. Spoke with Robby, patient has not visited lab but she did receive the new standing orders that were faxed over.

## 2024-06-12 NOTE — TELEPHONE ENCOUNTER
----- Message from Cony Mahoney RN sent at 5/29/2024 12:25 PM CDT -----  Regarding: Weekly Dobutamine Labs-CBC, CMP, BNP, Mg  ----- Message -----   From: Jelly Hummel LPN   Sent: 12/13/2023   1:56 PM CDT   To: [facility] Lvad Clinical   Subject: Weekly milrinone labs--link with pt/inr appo#     [facility] MIRIAM Landa Drawstation (may/may not received results same day) // [facility]  Lab (Ph) [phone] (Fx) [phone] // Every monday labs  Bios[facility] ph [phone] f- [phone]     NEW ORDERS FAXED TO [facility] AS PT APPEARS TO BE VISITING THIS PARTICULAR FACILITY TO GET LABS DRAWN   ----- Message -----   From: Jelly Hummel LPN   Sent: 11/17/2022  10:05 AM CST   To: [facility] Lvad Clinical   Subject: Weekly milrinone labs--link with pt/inr appo#     [facility] MIRIAM Landa Drawstation (may/may not received results same day) / Biosci[facility] ph [phone]   ----- Message -----   From: Inessa Arana RN   Sent: 9/22/2022   8:42 AM CST   To: [facility] Lvad Clinical   Subject: Weekly milrinone labs                             Bios[facility] ph [phone]

## 2024-06-20 ENCOUNTER — TELEPHONE (OUTPATIENT)
Dept: TRANSPLANT | Facility: CLINIC | Age: 39
End: 2024-06-20
Payer: MEDICAID

## 2024-06-20 NOTE — TELEPHONE ENCOUNTER
Attempting to contact patient/caregiver regarding lack of lab visits and equipment maintenance due at upcoming clinic appointment on 6/26/2024.  Need patient/caregiver to bring MPU with them to next appointment for maintenance.  Unable to leave message on any of the numbers provided.     It is medically necessary to ensure patient has properly functioning equipment and wearables to prevent infection, injury or death to patient.

## 2024-06-26 ENCOUNTER — TELEPHONE (OUTPATIENT)
Dept: TRANSPLANT | Facility: CLINIC | Age: 39
End: 2024-06-26
Payer: MEDICAID

## 2024-06-26 NOTE — TELEPHONE ENCOUNTER
----- Message from Cony Mahoney RN sent at 5/29/2024 12:25 PM CDT -----  Regarding: Weekly Dobutamine Labs-CBC, CMP, BNP, Mg  ----- Message -----   From: Jelly Hummel LPN   Sent: 12/13/2023   1:56 PM CDT   To: [facility] Lvad Clinical   Subject: Weekly milrinone labs--link with pt/inr appo#     [facility] MIRIAM Landa Drawstation (may/may not received results same day) // [facility]  Lab (Ph) [phone] (Fx) [phone] // Every monday labs  Bioscipt ph [phone] f- [phone]     NEW ORDERS FAXED TO [facility] AS PT APPEARS TO BE VISITING THIS PARTICULAR FACILITY TO GET LABS DRAWN   ----- Message -----   From: Jelly Hummel LPN   Sent: 11/17/2022  10:05 AM CST   To: [facility] Lvad Clinical   Subject: Weekly milrinone labs--link with pt/inr appo#     [facility] MIRIAM Landa Drawstation (may/may not received results same day) / Bioscipt ph [phone]   ----- Message -----   From: Inessa Arana RN   Sent: 9/22/2022   8:42 AM CST   To: [facility] Lvad Clinical   Subject: Weekly milrinone labs                             Bioscipt ph [phone]

## 2024-06-27 ENCOUNTER — PATIENT MESSAGE (OUTPATIENT)
Dept: TRANSPLANT | Facility: CLINIC | Age: 39
End: 2024-06-27
Payer: MEDICAID

## 2024-06-27 ENCOUNTER — TELEPHONE (OUTPATIENT)
Dept: PALLIATIVE MEDICINE | Facility: CLINIC | Age: 39
End: 2024-06-27
Payer: MEDICAID

## 2024-07-08 ENCOUNTER — PATIENT MESSAGE (OUTPATIENT)
Dept: CARDIOLOGY | Facility: CLINIC | Age: 39
End: 2024-07-08
Payer: MEDICAID

## 2024-07-09 ENCOUNTER — PATIENT MESSAGE (OUTPATIENT)
Dept: TRANSPLANT | Facility: CLINIC | Age: 39
End: 2024-07-09
Payer: MEDICAID

## 2024-07-12 ENCOUNTER — NURSE TRIAGE (OUTPATIENT)
Dept: ADMINISTRATIVE | Facility: CLINIC | Age: 39
End: 2024-07-12
Payer: MEDICAID

## 2024-07-12 LAB — INR PPP: 2.02

## 2024-07-12 NOTE — TELEPHONE ENCOUNTER
[place]    PCP:  ORALIA Flores    Pt escalated to [facility] queue.  NT returned a call to Emma but she has gone for the day.  NT spoke with Rossy, with  Hgb: 7.8.  Per protocol, care advised is call Provider now.  NT contacted Dr. Crum, OCP.  OCP recommends that level is okay.  OCP connected with lab for direct critical lab result release.  Advised to call for worsening/questions/concerns.  VU.    Reason for Disposition   Lab or radiology calling with CRITICAL test results    Additional Information   Negative: ED call to PCP (i.e., primary care provider; doctor, NP, or PA)   Negative: Doctor (or NP/PA) call to PCP   Negative: Call about patient who is currently hospitalized    Protocols used: PCP Call - No Triage-A-

## 2024-07-15 ENCOUNTER — ANTI-COAG VISIT (OUTPATIENT)
Dept: CARDIOLOGY | Facility: CLINIC | Age: 39
End: 2024-07-15
Payer: MEDICAID

## 2024-07-15 ENCOUNTER — DOCUMENTATION ONLY (OUTPATIENT)
Dept: TRANSPLANT | Facility: CLINIC | Age: 39
End: 2024-07-15
Payer: MEDICAID

## 2024-07-15 ENCOUNTER — PATIENT MESSAGE (OUTPATIENT)
Dept: TRANSPLANT | Facility: CLINIC | Age: 39
End: 2024-07-15
Payer: MEDICAID

## 2024-07-15 DIAGNOSIS — Z95.811 LVAD (LEFT VENTRICULAR ASSIST DEVICE) PRESENT: Primary | ICD-10-CM

## 2024-07-15 LAB
EXT ALBUMIN: 2.8
EXT ALT: 18
EXT AST: 22
EXT BILIRUBIN TOTAL: 2.4
EXT BUN: 11
EXT CALCIUM: 8.9
EXT CHLORIDE: 95
EXT CREATININE: 1.17 MG/DL
EXT GLUCOSE: 226
EXT HEMATOCRIT: 27.4
EXT HEMOGLOBIN: 7.8
EXT PLATELETS: 283
EXT POTASSIUM: 2.8
EXT PROTEIN TOTAL: 8.8
EXT SODIUM: 134 MMOL/L
EXT WBC: 7.47

## 2024-07-15 PROCEDURE — 93793 ANTICOAG MGMT PT WARFARIN: CPT | Mod: ,,,

## 2024-07-15 NOTE — PROGRESS NOTES
[facility]    07/15/2024 8:39 AM    Assessment/Plan:    Patient presents today with therapeutic INR.    Assessment of patient findings and chart review: no changes noted    Recommendation for patient's warfarin regimen: Continue current maintenance dose    Recommend repeat INR in 1 week  _________________________________________________________________    Kevan Queen (39 y.o.) is followed by [facility].    Anticoagulation Summary  As of 7/15/2024      INR goal:  2.0-3.0   TTR:  48.8% (1.5 y)   INR used for dosin.02 (2024)   Warfarin maintenance plan:  3 mg (3 mg x 1) every day   Weekly warfarin total:  21 mg   Plan last modified:  Yolanda Lee, PharmD (2024)   Next INR check:  2024   Target end date:      Indications    LVAD (left ventricular assist device) present [Z95.811]                 Anticoagulation Episode Summary       INR check location:      Preferred lab:      Send INR reminders to:  [facility] COUMADIN LVAD    Comments:  LVAD // ANGELICA Landa Drawstation (may/may not received results same day) // [facility] (Ph) [phone] (Fx) [phone] // Every monday labs  Bioscipt ph [phone] f- [phone]          Anticoagulation Care Providers       Provider Role Specialty Phone number    Josh Ryan MD [facility] [phone]

## 2024-07-15 NOTE — PROGRESS NOTES
External labs received, reviewed and entered into Epic. Attempted to contact patient to review. K is 2.8, prescribed 30mEq once daily. No answer on any numbers in the chart. [employer] message sent.

## 2024-07-15 NOTE — PROGRESS NOTES
Page (significant other) called and verified correct warfarin dose, reports Patient has had some leakage at LVAD site, no other changes reported, advised Ms Page of warfarin instructions and next lab date, also advised to contact LVAD regarding leakage

## 2024-07-16 ENCOUNTER — TELEPHONE (OUTPATIENT)
Dept: TRANSPLANT | Facility: CLINIC | Age: 39
End: 2024-07-16
Payer: MEDICAID

## 2024-07-16 NOTE — TELEPHONE ENCOUNTER
Attempted to contact patient as coumadin clinic notified VAD team that patient's caregiver reported DLES drainage, unable to contact patient due to invalid phone numbers. Coumadin clinic instructed them to notify VAD team of this when they spoke, patient nor wife has reached out.

## 2024-07-22 NOTE — PROGRESS NOTES
07/22/2024 - S/W Patient girlfriend Darlyn Wright  rescheduled missed lab appointment from 7/19/24 to 7/25/24  Confirmed correct coumadin dose .  Reports pt has Leakage in drive line ; but patient will not go/ doesn't want to go  to ER regarding this issue .   Denies any pain or other changes.

## 2024-07-24 ENCOUNTER — TELEPHONE (OUTPATIENT)
Dept: TRANSPLANT | Facility: CLINIC | Age: 39
End: 2024-07-24
Payer: MEDICAID

## 2024-07-24 ENCOUNTER — PATIENT MESSAGE (OUTPATIENT)
Dept: CARDIOTHORACIC SURGERY | Facility: CLINIC | Age: 39
End: 2024-07-24
Payer: MEDICAID

## 2024-07-24 NOTE — TELEPHONE ENCOUNTER
[facility] needs new order and recent clinicals sent.     ----- Message from Meggan Borden MA sent at 7/23/2024  3:35 PM CDT -----  Regarding: FW: rx    ----- Message -----  From: Anabel Boston  Sent: 7/23/2024  11:49 AM CDT  To: [facility]  Subject: rx                                               milrinone 20mg/100ml D5W, 200mcg/ml, (PRIMACOR) 20 mg/100 mL (200 mcg/mL) infusion    [facility] [phone]  Fax [phone]

## 2024-07-25 ENCOUNTER — TELEPHONE (OUTPATIENT)
Dept: TRANSPLANT | Facility: CLINIC | Age: 39
End: 2024-07-25
Payer: MEDICAID

## 2024-07-31 ENCOUNTER — SOCIAL WORK (OUTPATIENT)
Dept: TRANSPLANT | Facility: CLINIC | Age: 39
End: 2024-07-31
Payer: MEDICAID

## 2024-07-31 NOTE — PROGRESS NOTES
TREY requested by VAD coor to fax updated orders to [facility]. TREY contacted pharmacist at [facility] in [phone] who advised that pt's rx is good until Nov.  TERY pulled new orders from [facility] and faxed new orders pre VAD coor request.  Fax: [phone]

## 2024-08-01 ENCOUNTER — TELEPHONE (OUTPATIENT)
Dept: TRANSPLANT | Facility: CLINIC | Age: 39
End: 2024-08-01
Payer: MEDICAID

## 2024-08-01 NOTE — TELEPHONE ENCOUNTER
Received call from Ortiz, Pharm Tech/. Ortiz needs to verify labs needed as new Milrinone Rx only has PT/INR. Orders given for CBC/CMP/BNP/Mg. Ortiz also needs a weight as the last weight they have is from 5/21/24. Unfortunately, that is the last weight that we have as well as patient has not followed up since then. He has f/u on 8/17. Will use weight of 84.5kg from 5/21/24 while admitted.     Ortiz informs that she is new to [facility] and has been given Mr. Queen's case. She has many concerns due to non-compliance and still receiving Milrinone. She has only been able to speak to Robyn who reports to her that Kevan just sleeps most of the time and Robyn will not allow Ortiz to speak with Kevan. Ortiz is concerned that there is a lack of support and care in the home by Robyn. Confirmed with Ortiz that patient has not been to see them in person since 10/31/24, that is correct and that is there last documented PICC dressing change. Ortiz states that she has been looking into LTAC facilities options for patient so that he can get better care than at home. I informed Ortiz that, unfortunately, with his LVAD, there are no LTAC that he can go to unless they want to become VAD educated. I also explained to Ortiz the dynamic situation of patient.     I discussed with Ortiz that I will talk with our VAD Medical Director, Dr. Dalia Crum, when she returns on 8/12 about discontinuing patients Milrinone d/t non-compliance and safety risk. Patient does have a f/u appt 8/17 so would also like to see if patient comes to that appt. Patient nor caregiver have returned any phone calls or MyChart messages from VAD Team or coumadin clinic.

## 2024-08-05 ENCOUNTER — TELEPHONE (OUTPATIENT)
Dept: TRANSPLANT | Facility: CLINIC | Age: 39
End: 2024-08-05
Payer: MEDICAID

## 2024-08-09 ENCOUNTER — TELEPHONE (OUTPATIENT)
Dept: TRANSPLANT | Facility: CLINIC | Age: 39
End: 2024-08-09
Payer: MEDICAID

## 2024-08-09 ENCOUNTER — PATIENT MESSAGE (OUTPATIENT)
Dept: TRANSPLANT | Facility: CLINIC | Age: 39
End: 2024-08-09
Payer: MEDICAID

## 2024-08-15 ENCOUNTER — TELEPHONE (OUTPATIENT)
Dept: TRANSPLANT | Facility: CLINIC | Age: 39
End: 2024-08-15
Payer: MEDICAID

## 2024-08-15 NOTE — TELEPHONE ENCOUNTER
Patient's wife paged to reschedule Kevan's missed appt from yesterday, 8/14. RANDOLPH Farrar, rescheduled him for 8/29 but Robyn is asking to come the following week so that they will have their checks deposited. I scheduled patient for 9/4 with labs at 1 and MD at 2.     Robyn informed that Kevan is on his last bag of Primacor. [facility] will not supply anymore d/t noncompliance of patient. They also do not have an accurate weight. I did let Robyn know that unfortunately, I have spoken to [employer]'s multiple times about Kevan and tried to reach out to her and Kevan but no one answered and no one called back; therefore, it is unsafe to continue the Primacor on Kevan without labs, accurate weight, and weekly dressing changes. I educated Robyn that once Kevan runs out of Primacor, he is not going to feel well with symptoms of SOB, fluid overload, and fatigue. He will simply begin to have more heart failure symptoms. Robyn verbalized understanding and states that she has tried to tell Kevan this but he doesn't listen. I advised to call 911 if he starts experiencing these symptoms. Robyn verbalized understanding.     Dr. Crum and on call VC, Cony Mahoney, made aware.

## 2024-08-15 NOTE — TELEPHONE ENCOUNTER
Returned call, selected [facility] for pharmacy and nurse, no answer on either line and no voicemail box available.     ----- Message from Mirna Franz sent at 8/15/2024  3:38 PM CDT -----  Regarding: Pr f/u  Lucien [phone] with [facility] calling to do a f/u on the pt after his visit.    Thanks

## 2024-08-21 ENCOUNTER — DOCUMENTATION ONLY (OUTPATIENT)
Dept: CARDIOTHORACIC SURGERY | Facility: CLINIC | Age: 39
End: 2024-08-21
Payer: MEDICAID

## 2024-08-21 NOTE — PROGRESS NOTES
Pt was scheduled for a 6 minute walk for his 24 month f/u for INTER[facility] at the following appts, which was not initiated due to:    5/16/24: Pt cancelled appt.  5/20/24: Pt cancelled appt.  6/26/24: Pt did not present for appt.  8/14/24: Pt did not present for appt.

## 2024-08-23 ENCOUNTER — TELEPHONE (OUTPATIENT)
Dept: TRANSPLANT | Facility: CLINIC | Age: 39
End: 2024-08-23
Payer: MEDICAID

## 2024-08-23 NOTE — TELEPHONE ENCOUNTER
Page received from patient's mother, Malou. She reports that patient has been out of Primacor for 5 days, is not feeling well, and does not have transportation to go anywhere. She is asking what he needs to do. I informed Malou that Kevan will need to call 911 in order to be taken to his local ER and then will discuss transfer to [place]. I did inform Malou that Kevan has not shown to his clinic appts, labs or infusion appts; therefore, the primacor was not reordered for concern of patient safety. Mother was aware of this and verbalized understanding.

## 2024-08-25 ENCOUNTER — HOSPITAL ENCOUNTER (EMERGENCY)
Facility: HOSPITAL | Age: 39
Discharge: ANOTHER HOSPITAL | End: 2024-08-25
Attending: EMERGENCY MEDICINE
Payer: MEDICAID

## 2024-08-25 ENCOUNTER — HOSPITAL ENCOUNTER (INPATIENT)
Facility: HOSPITAL | Age: 39
LOS: 16 days | Discharge: HOME | DRG: 291 | End: 2024-09-10
Attending: INTERNAL MEDICINE | Admitting: INTERNAL MEDICINE
Payer: MEDICAID

## 2024-08-25 VITALS
RESPIRATION RATE: 18 BRPM | HEIGHT: 75 IN | TEMPERATURE: 98 F | WEIGHT: 170 LBS | OXYGEN SATURATION: 98 % | DIASTOLIC BLOOD PRESSURE: 90 MMHG | HEART RATE: 101 BPM | SYSTOLIC BLOOD PRESSURE: 129 MMHG | BODY MASS INDEX: 21.14 KG/M2

## 2024-08-25 DIAGNOSIS — R52 PAIN: ICD-10-CM

## 2024-08-25 DIAGNOSIS — Z51.5 PALLIATIVE CARE ENCOUNTER: ICD-10-CM

## 2024-08-25 DIAGNOSIS — I50.814 RIGHT HEART FAILURE SECONDARY TO LEFT HEART FAILURE: ICD-10-CM

## 2024-08-25 DIAGNOSIS — I50.9 ACUTE ON CHRONIC HEART FAILURE: ICD-10-CM

## 2024-08-25 DIAGNOSIS — E11.65 TYPE 2 DIABETES MELLITUS WITH HYPERGLYCEMIA, WITH LONG-TERM CURRENT USE OF INSULIN: ICD-10-CM

## 2024-08-25 DIAGNOSIS — Z71.89 ADVANCED CARE PLANNING/COUNSELING DISCUSSION: ICD-10-CM

## 2024-08-25 DIAGNOSIS — R73.9 ACUTE HYPERGLYCEMIA: ICD-10-CM

## 2024-08-25 DIAGNOSIS — E11.65 TYPE 2 DIABETES MELLITUS WITH HYPERGLYCEMIA, UNSPECIFIED WHETHER LONG TERM INSULIN USE: ICD-10-CM

## 2024-08-25 DIAGNOSIS — I50.40 COMBINED SYSTOLIC AND DIASTOLIC HEART FAILURE: ICD-10-CM

## 2024-08-25 DIAGNOSIS — I50.23 ACUTE ON CHRONIC SYSTOLIC CHF (CONGESTIVE HEART FAILURE): ICD-10-CM

## 2024-08-25 DIAGNOSIS — E87.6 HYPOKALEMIA: ICD-10-CM

## 2024-08-25 DIAGNOSIS — Z91.199 HISTORY OF NONCOMPLIANCE WITH MEDICAL TREATMENT: ICD-10-CM

## 2024-08-25 DIAGNOSIS — R06.00 DYSPNEA, UNSPECIFIED TYPE: ICD-10-CM

## 2024-08-25 DIAGNOSIS — Z71.89 GOALS OF CARE, COUNSELING/DISCUSSION: ICD-10-CM

## 2024-08-25 DIAGNOSIS — R07.9 CHEST PAIN: ICD-10-CM

## 2024-08-25 DIAGNOSIS — I50.42 CHRONIC COMBINED SYSTOLIC AND DIASTOLIC HEART FAILURE: ICD-10-CM

## 2024-08-25 DIAGNOSIS — R53.1 GENERAL WEAKNESS: ICD-10-CM

## 2024-08-25 DIAGNOSIS — B49 FUNGEMIA: ICD-10-CM

## 2024-08-25 DIAGNOSIS — T82.7XXA INFECTION ASSOCIATED WITH DRIVELINE OF LEFT VENTRICULAR ASSIST DEVICE (LVAD): ICD-10-CM

## 2024-08-25 DIAGNOSIS — Z95.811 LVAD (LEFT VENTRICULAR ASSIST DEVICE) PRESENT: Primary | ICD-10-CM

## 2024-08-25 DIAGNOSIS — I50.9 ACUTE ON CHRONIC HEART FAILURE, UNSPECIFIED HEART FAILURE TYPE: Primary | ICD-10-CM

## 2024-08-25 DIAGNOSIS — M54.50 ACUTE BILATERAL LOW BACK PAIN WITHOUT SCIATICA: ICD-10-CM

## 2024-08-25 DIAGNOSIS — Z79.4 TYPE 2 DIABETES MELLITUS WITH HYPERGLYCEMIA, WITH LONG-TERM CURRENT USE OF INSULIN: ICD-10-CM

## 2024-08-25 PROBLEM — M54.9 BILATERAL BACK PAIN: Status: ACTIVE | Noted: 2024-08-25

## 2024-08-25 PROBLEM — R79.1 SUPRATHERAPEUTIC INR: Status: ACTIVE | Noted: 2024-08-25

## 2024-08-25 LAB
ABS NEUT (OLG): 8.88 X10(3)/MCL (ref 2.1–9.2)
ALBUMIN SERPL BCP-MCNC: 2.4 G/DL (ref 3.5–5.2)
ALBUMIN SERPL-MCNC: 2.6 G/DL (ref 3.5–5)
ALBUMIN/GLOB SERPL: 0.4 RATIO (ref 1.1–2)
ALLENS TEST BLOOD GAS (OHS): ABNORMAL
ALP SERPL-CCNC: 207 U/L (ref 55–135)
ALP SERPL-CCNC: 208 UNIT/L (ref 40–150)
ALT SERPL W/O P-5'-P-CCNC: 14 U/L (ref 10–44)
ALT SERPL-CCNC: 17 UNIT/L (ref 0–55)
AMPHET UR QL SCN: NEGATIVE
ANION GAP SERPL CALC-SCNC: 10 MMOL/L (ref 8–16)
ANION GAP SERPL CALC-SCNC: 14 MEQ/L
ANION GAP SERPL CALC-SCNC: 7 MMOL/L (ref 8–16)
ANISOCYTOSIS BLD QL SMEAR: ABNORMAL
APTT PPP: 49.5 SEC (ref 21–32)
AST SERPL-CCNC: 24 U/L (ref 10–40)
AST SERPL-CCNC: 29 UNIT/L (ref 5–34)
B-OH-BUTYR SERPL-MCNC: 0.1 MMOL/L
BACTERIA #/AREA URNS AUTO: ABNORMAL /HPF
BARBITURATE SCN PRESENT UR: NEGATIVE
BASE EXCESS BLD CALC-SCNC: 10.3 MMOL/L
BASOPHILS # BLD AUTO: 0.05 K/UL (ref 0–0.2)
BASOPHILS NFR BLD MANUAL: 0.1 X10(3)/MCL (ref 0–0.2)
BASOPHILS NFR BLD MANUAL: 1 %
BASOPHILS NFR BLD: 0.5 % (ref 0–1.9)
BENZODIAZ UR QL SCN: NEGATIVE
BILIRUB DIRECT SERPL-MCNC: 1.8 MG/DL (ref 0.1–0.3)
BILIRUB SERPL-MCNC: 2.3 MG/DL (ref 0.1–1)
BILIRUB SERPL-MCNC: 2.6 MG/DL
BILIRUB UR QL STRIP.AUTO: NEGATIVE
BLOOD GAS SAMPLE TYPE (OHS): ABNORMAL
BNP SERPL-MCNC: 593 PG/ML (ref 0–99)
BUN SERPL-MCNC: 11 MG/DL (ref 6–20)
BUN SERPL-MCNC: 12.7 MG/DL (ref 8.9–20.6)
BUN SERPL-MCNC: 13 MG/DL (ref 6–20)
BURR CELLS (OLG): ABNORMAL
CA-I BLD-SCNC: 1.06 MMOL/L (ref 1.12–1.23)
CALCIUM SERPL-MCNC: 8.7 MG/DL (ref 8.7–10.5)
CALCIUM SERPL-MCNC: 8.8 MG/DL (ref 8.7–10.5)
CALCIUM SERPL-MCNC: 9 MG/DL (ref 8.4–10.2)
CANNABINOIDS UR QL SCN: NEGATIVE
CHLORIDE SERPL-SCNC: 87 MMOL/L (ref 98–107)
CHLORIDE SERPL-SCNC: 92 MMOL/L (ref 95–110)
CHLORIDE SERPL-SCNC: 95 MMOL/L (ref 95–110)
CK SERPL-CCNC: 24 U/L (ref 20–200)
CLARITY UR: CLEAR
CO2 BLDA-SCNC: 36.4 MMOL/L
CO2 SERPL-SCNC: 27 MMOL/L (ref 22–29)
CO2 SERPL-SCNC: 31 MMOL/L (ref 23–29)
CO2 SERPL-SCNC: 32 MMOL/L (ref 23–29)
COCAINE UR QL SCN: NEGATIVE
COHGB MFR BLDA: 6.2 %
COLOR UR AUTO: COLORLESS
CREAT SERPL-MCNC: 0.9 MG/DL (ref 0.5–1.4)
CREAT SERPL-MCNC: 1 MG/DL (ref 0.5–1.4)
CREAT SERPL-MCNC: 1.33 MG/DL (ref 0.73–1.18)
CREAT/UREA NIT SERPL: 10
CRP SERPL-MCNC: 60.7 MG/L (ref 0–8.2)
DIFFERENTIAL METHOD BLD: ABNORMAL
DRAWN BY BLOOD GAS (OHS): ABNORMAL
EOSINOPHIL # BLD AUTO: 0.1 K/UL (ref 0–0.5)
EOSINOPHIL NFR BLD: 1.3 % (ref 0–8)
ERYTHROCYTE [DISTWIDTH] IN BLOOD BY AUTOMATED COUNT: 27.9 % (ref 11.5–14.5)
ERYTHROCYTE [DISTWIDTH] IN BLOOD BY AUTOMATED COUNT: 27.9 % (ref 11.5–17)
EST. GFR  (NO RACE VARIABLE): >60 ML/MIN/1.73 M^2
EST. GFR  (NO RACE VARIABLE): >60 ML/MIN/1.73 M^2
ESTIMATED AVG GLUCOSE: 249 MG/DL (ref 68–131)
ETHANOL SERPL-MCNC: <10 MG/DL
FENTANYL UR QL SCN: NEGATIVE
FLUAV AG UPPER RESP QL IA.RAPID: NOT DETECTED
FLUBV AG UPPER RESP QL IA.RAPID: NOT DETECTED
GFR SERPLBLD CREATININE-BSD FMLA CKD-EPI: >60 ML/MIN/1.73/M2
GLOBULIN SER-MCNC: 6 GM/DL (ref 2.4–3.5)
GLUCOSE SERPL-MCNC: 239 MG/DL (ref 70–110)
GLUCOSE SERPL-MCNC: 295 MG/DL (ref 70–110)
GLUCOSE SERPL-MCNC: 776 MG/DL (ref 74–100)
GLUCOSE UR QL STRIP: ABNORMAL
HBA1C MFR BLD: 10.3 % (ref 4–5.6)
HCO3 BLDA-SCNC: 35 MMOL/L
HCT VFR BLD AUTO: 29.6 % (ref 40–54)
HCT VFR BLD AUTO: 29.6 % (ref 42–52)
HGB BLD-MCNC: 8.5 G/DL (ref 14–18)
HGB BLD-MCNC: 8.7 G/DL (ref 14–18)
HGB UR QL STRIP: ABNORMAL
IMM GRANULOCYTES # BLD AUTO: 0.03 K/UL (ref 0–0.04)
IMM GRANULOCYTES NFR BLD AUTO: 0.3 % (ref 0–0.5)
INHALED O2 CONCENTRATION: 21 %
INR PPP: 4.7 (ref 0.8–1.2)
INSTRUMENT WBC (OLG): 9.87 X10(3)/MCL
KETONES UR QL STRIP: NEGATIVE
LDH SERPL L TO P-CCNC: 283 U/L (ref 110–260)
LEUKOCYTE ESTERASE UR QL STRIP: NEGATIVE
LYMPHOCYTES # BLD AUTO: 1.9 K/UL (ref 1–4.8)
LYMPHOCYTES NFR BLD MANUAL: 0.59 X10(3)/MCL
LYMPHOCYTES NFR BLD MANUAL: 6 %
LYMPHOCYTES NFR BLD: 19.3 % (ref 18–48)
MACROCYTES BLD QL SMEAR: ABNORMAL
MAGNESIUM SERPL-MCNC: 1.6 MG/DL (ref 1.6–2.6)
MCH RBC QN AUTO: 19.1 PG (ref 27–31)
MCH RBC QN AUTO: 19.3 PG (ref 27–31)
MCHC RBC AUTO-ENTMCNC: 28.7 G/DL (ref 32–36)
MCHC RBC AUTO-ENTMCNC: 29.4 G/DL (ref 33–36)
MCV RBC AUTO: 65.1 FL (ref 80–94)
MCV RBC AUTO: 67 FL (ref 82–98)
MDMA UR QL SCN: NEGATIVE
METHGB MFR BLDA: 1 %
MONOCYTES # BLD AUTO: 0.9 K/UL (ref 0.3–1)
MONOCYTES NFR BLD MANUAL: 0.3 X10(3)/MCL (ref 0.1–1.3)
MONOCYTES NFR BLD MANUAL: 3 %
MONOCYTES NFR BLD: 8.6 % (ref 4–15)
NEUTROPHILS # BLD AUTO: 7 K/UL (ref 1.8–7.7)
NEUTROPHILS NFR BLD MANUAL: 90 %
NEUTROPHILS NFR BLD: 70 % (ref 38–73)
NITRITE UR QL STRIP: NEGATIVE
NRBC BLD AUTO-RTO: 0 %
NRBC BLD-RTO: 0 /100 WBC
O2 HB BLOOD GAS (OHS): 78.6 %
OPIATES UR QL SCN: NEGATIVE
OXYHGB MFR BLDA: 9.7 G/DL
PCO2 BLDA: 47 MMHG (ref 20–50)
PCP UR QL: NEGATIVE
PH BLDA: 7.48 [PH] (ref 7.3–7.6)
PH UR STRIP: 7 [PH]
PH UR: 7 [PH] (ref 3–11)
PHOSPHATE SERPL-MCNC: 2.5 MG/DL (ref 2.7–4.5)
PLATELET # BLD AUTO: 215 K/UL (ref 150–450)
PLATELET # BLD AUTO: 218 X10(3)/MCL (ref 130–400)
PLATELET # BLD EST: NORMAL 10*3/UL
PLATELETS.RETICULATED NFR BLD AUTO: 7.9 % (ref 0.9–11.2)
PMV BLD AUTO: ABNORMAL FL
PMV BLD AUTO: ABNORMAL FL (ref 9.2–12.9)
PO2 BLDA: 51 MMHG
POCT GLUCOSE: 244 MG/DL (ref 70–110)
POCT GLUCOSE: 386 MG/DL (ref 70–110)
POCT GLUCOSE: 464 MG/DL (ref 70–110)
POCT GLUCOSE: 500 MG/DL (ref 70–110)
POCT GLUCOSE: >500 MG/DL (ref 70–110)
POIKILOCYTOSIS BLD QL SMEAR: ABNORMAL
POTASSIUM BLOOD GAS (OHS): 4.4 MMOL/L (ref 3.5–5)
POTASSIUM SERPL-SCNC: 2.7 MMOL/L (ref 3.5–5.1)
POTASSIUM SERPL-SCNC: 3.3 MMOL/L (ref 3.5–5.1)
POTASSIUM SERPL-SCNC: 3.4 MMOL/L (ref 3.5–5.1)
PROT SERPL-MCNC: 7.9 G/DL (ref 6–8.4)
PROT SERPL-MCNC: 8.6 GM/DL (ref 6.4–8.3)
PROT UR QL STRIP: NEGATIVE
PROTHROMBIN TIME: 47 SEC (ref 9–12.5)
RBC # BLD AUTO: 4.41 M/UL (ref 4.6–6.2)
RBC # BLD AUTO: 4.55 X10(6)/MCL (ref 4.7–6.1)
RBC #/AREA URNS AUTO: ABNORMAL /HPF
RBC MORPH BLD: ABNORMAL
SAO2 % BLDA: 88.4 %
SARS-COV-2 RNA RESP QL NAA+PROBE: NOT DETECTED
SODIUM BLOOD GAS (OHS): 128 MMOL/L (ref 137–145)
SODIUM SERPL-SCNC: 128 MMOL/L (ref 136–145)
SODIUM SERPL-SCNC: 133 MMOL/L (ref 136–145)
SODIUM SERPL-SCNC: 134 MMOL/L (ref 136–145)
SP GR UR STRIP.AUTO: 1.01 (ref 1–1.03)
SPECIFIC GRAVITY, URINE AUTO (.000) (OHS): 1.01 (ref 1–1.03)
SQUAMOUS #/AREA URNS LPF: ABNORMAL /HPF
TARGETS BLD QL SMEAR: ABNORMAL
UROBILINOGEN UR STRIP-ACNC: 2
WBC # BLD AUTO: 9.87 X10(3)/MCL (ref 4.5–11.5)
WBC # BLD AUTO: 9.96 K/UL (ref 3.9–12.7)
WBC #/AREA URNS AUTO: ABNORMAL /HPF

## 2024-08-25 PROCEDURE — 85610 PROTHROMBIN TIME: CPT | Performed by: STUDENT IN AN ORGANIZED HEALTH CARE EDUCATION/TRAINING PROGRAM

## 2024-08-25 PROCEDURE — 25000003 PHARM REV CODE 250: Performed by: EMERGENCY MEDICINE

## 2024-08-25 PROCEDURE — 86140 C-REACTIVE PROTEIN: CPT | Performed by: STUDENT IN AN ORGANIZED HEALTH CARE EDUCATION/TRAINING PROGRAM

## 2024-08-25 PROCEDURE — 83615 LACTATE (LD) (LDH) ENZYME: CPT | Performed by: STUDENT IN AN ORGANIZED HEALTH CARE EDUCATION/TRAINING PROGRAM

## 2024-08-25 PROCEDURE — 80307 DRUG TEST PRSMV CHEM ANLYZR: CPT | Performed by: EMERGENCY MEDICINE

## 2024-08-25 PROCEDURE — 63600175 PHARM REV CODE 636 W HCPCS

## 2024-08-25 PROCEDURE — 25000003 PHARM REV CODE 250: Performed by: INTERNAL MEDICINE

## 2024-08-25 PROCEDURE — 36415 COLL VENOUS BLD VENIPUNCTURE: CPT | Performed by: STUDENT IN AN ORGANIZED HEALTH CARE EDUCATION/TRAINING PROGRAM

## 2024-08-25 PROCEDURE — 20600001 HC STEP DOWN PRIVATE ROOM

## 2024-08-25 PROCEDURE — 99223 1ST HOSP IP/OBS HIGH 75: CPT | Mod: ,,, | Performed by: INTERNAL MEDICINE

## 2024-08-25 PROCEDURE — 87076 CULTURE ANAEROBE IDENT EACH: CPT | Performed by: STUDENT IN AN ORGANIZED HEALTH CARE EDUCATION/TRAINING PROGRAM

## 2024-08-25 PROCEDURE — 84100 ASSAY OF PHOSPHORUS: CPT | Performed by: STUDENT IN AN ORGANIZED HEALTH CARE EDUCATION/TRAINING PROGRAM

## 2024-08-25 PROCEDURE — 99900035 HC TECH TIME PER 15 MIN (STAT)

## 2024-08-25 PROCEDURE — 25000003 PHARM REV CODE 250: Performed by: STUDENT IN AN ORGANIZED HEALTH CARE EDUCATION/TRAINING PROGRAM

## 2024-08-25 PROCEDURE — 80053 COMPREHEN METABOLIC PANEL: CPT | Performed by: EMERGENCY MEDICINE

## 2024-08-25 PROCEDURE — 63600175 PHARM REV CODE 636 W HCPCS: Performed by: EMERGENCY MEDICINE

## 2024-08-25 PROCEDURE — 63600175 PHARM REV CODE 636 W HCPCS: Performed by: STUDENT IN AN ORGANIZED HEALTH CARE EDUCATION/TRAINING PROGRAM

## 2024-08-25 PROCEDURE — 82077 ASSAY SPEC XCP UR&BREATH IA: CPT | Performed by: EMERGENCY MEDICINE

## 2024-08-25 PROCEDURE — S4991 NICOTINE PATCH NONLEGEND: HCPCS | Performed by: STUDENT IN AN ORGANIZED HEALTH CARE EDUCATION/TRAINING PROGRAM

## 2024-08-25 PROCEDURE — 85027 COMPLETE CBC AUTOMATED: CPT | Performed by: EMERGENCY MEDICINE

## 2024-08-25 PROCEDURE — 82550 ASSAY OF CK (CPK): CPT | Performed by: STUDENT IN AN ORGANIZED HEALTH CARE EDUCATION/TRAINING PROGRAM

## 2024-08-25 PROCEDURE — 81001 URINALYSIS AUTO W/SCOPE: CPT | Performed by: EMERGENCY MEDICINE

## 2024-08-25 PROCEDURE — 93750 INTERROGATION VAD IN PERSON: CPT | Mod: ,,, | Performed by: INTERNAL MEDICINE

## 2024-08-25 PROCEDURE — 82962 GLUCOSE BLOOD TEST: CPT

## 2024-08-25 PROCEDURE — 99285 EMERGENCY DEPT VISIT HI MDM: CPT | Mod: 25

## 2024-08-25 PROCEDURE — 99222 1ST HOSP IP/OBS MODERATE 55: CPT | Mod: ,,,

## 2024-08-25 PROCEDURE — 83036 HEMOGLOBIN GLYCOSYLATED A1C: CPT | Performed by: STUDENT IN AN ORGANIZED HEALTH CARE EDUCATION/TRAINING PROGRAM

## 2024-08-25 PROCEDURE — 96372 THER/PROPH/DIAG INJ SC/IM: CPT | Performed by: EMERGENCY MEDICINE

## 2024-08-25 PROCEDURE — 82803 BLOOD GASES ANY COMBINATION: CPT

## 2024-08-25 PROCEDURE — 80076 HEPATIC FUNCTION PANEL: CPT | Performed by: STUDENT IN AN ORGANIZED HEALTH CARE EDUCATION/TRAINING PROGRAM

## 2024-08-25 PROCEDURE — 87075 CULTR BACTERIA EXCEPT BLOOD: CPT | Performed by: STUDENT IN AN ORGANIZED HEALTH CARE EDUCATION/TRAINING PROGRAM

## 2024-08-25 PROCEDURE — 96365 THER/PROPH/DIAG IV INF INIT: CPT

## 2024-08-25 PROCEDURE — 0240U COVID/FLU A&B PCR: CPT | Performed by: EMERGENCY MEDICINE

## 2024-08-25 PROCEDURE — 80048 BASIC METABOLIC PNL TOTAL CA: CPT | Mod: 91,XB | Performed by: STUDENT IN AN ORGANIZED HEALTH CARE EDUCATION/TRAINING PROGRAM

## 2024-08-25 PROCEDURE — 27000248 HC VAD-ADDITIONAL DAY

## 2024-08-25 PROCEDURE — 85025 COMPLETE CBC W/AUTO DIFF WBC: CPT | Performed by: STUDENT IN AN ORGANIZED HEALTH CARE EDUCATION/TRAINING PROGRAM

## 2024-08-25 PROCEDURE — 85730 THROMBOPLASTIN TIME PARTIAL: CPT | Performed by: STUDENT IN AN ORGANIZED HEALTH CARE EDUCATION/TRAINING PROGRAM

## 2024-08-25 PROCEDURE — 83735 ASSAY OF MAGNESIUM: CPT | Performed by: EMERGENCY MEDICINE

## 2024-08-25 PROCEDURE — 83880 ASSAY OF NATRIURETIC PEPTIDE: CPT | Performed by: STUDENT IN AN ORGANIZED HEALTH CARE EDUCATION/TRAINING PROGRAM

## 2024-08-25 PROCEDURE — 82010 KETONE BODYS QUAN: CPT | Performed by: EMERGENCY MEDICINE

## 2024-08-25 RX ORDER — INSULIN ASPART 100 [IU]/ML
14 INJECTION, SOLUTION INTRAVENOUS; SUBCUTANEOUS
Status: DISCONTINUED | OUTPATIENT
Start: 2024-08-25 | End: 2024-08-26

## 2024-08-25 RX ORDER — IBUPROFEN 200 MG
16 TABLET ORAL
Status: DISCONTINUED | OUTPATIENT
Start: 2024-08-25 | End: 2024-09-10 | Stop reason: HOSPADM

## 2024-08-25 RX ORDER — AMLODIPINE BESYLATE 5 MG/1
5 TABLET ORAL DAILY
Status: DISCONTINUED | OUTPATIENT
Start: 2024-08-25 | End: 2024-09-10 | Stop reason: HOSPADM

## 2024-08-25 RX ORDER — DULOXETIN HYDROCHLORIDE 30 MG/1
30 CAPSULE, DELAYED RELEASE ORAL DAILY
Status: DISCONTINUED | OUTPATIENT
Start: 2024-08-25 | End: 2024-09-10 | Stop reason: HOSPADM

## 2024-08-25 RX ORDER — INSULIN GLARGINE 100 [IU]/ML
10 INJECTION, SOLUTION SUBCUTANEOUS NIGHTLY
Status: DISCONTINUED | OUTPATIENT
Start: 2024-08-25 | End: 2024-08-25

## 2024-08-25 RX ORDER — FUROSEMIDE 80 MG/1
80 TABLET ORAL DAILY
Status: DISCONTINUED | OUTPATIENT
Start: 2024-08-25 | End: 2024-08-26

## 2024-08-25 RX ORDER — PANTOPRAZOLE SODIUM 40 MG/1
40 TABLET, DELAYED RELEASE ORAL DAILY
Status: DISCONTINUED | OUTPATIENT
Start: 2024-08-25 | End: 2024-09-10 | Stop reason: HOSPADM

## 2024-08-25 RX ORDER — ONDANSETRON 4 MG/1
4 TABLET, ORALLY DISINTEGRATING ORAL EVERY 8 HOURS PRN
Status: DISCONTINUED | OUTPATIENT
Start: 2024-08-25 | End: 2024-09-10 | Stop reason: HOSPADM

## 2024-08-25 RX ORDER — SPIRONOLACTONE 25 MG/1
25 TABLET ORAL DAILY
Status: DISCONTINUED | OUTPATIENT
Start: 2024-08-25 | End: 2024-08-28

## 2024-08-25 RX ORDER — GABAPENTIN 100 MG/1
100 CAPSULE ORAL 2 TIMES DAILY
Status: DISCONTINUED | OUTPATIENT
Start: 2024-08-25 | End: 2024-09-10 | Stop reason: HOSPADM

## 2024-08-25 RX ORDER — ACETAMINOPHEN 500 MG
1000 TABLET ORAL
Status: COMPLETED | OUTPATIENT
Start: 2024-08-25 | End: 2024-08-25

## 2024-08-25 RX ORDER — INSULIN ASPART 100 [IU]/ML
0-10 INJECTION, SOLUTION INTRAVENOUS; SUBCUTANEOUS
Status: DISCONTINUED | OUTPATIENT
Start: 2024-08-25 | End: 2024-09-10 | Stop reason: HOSPADM

## 2024-08-25 RX ORDER — INSULIN ASPART 100 [IU]/ML
0-10 INJECTION, SOLUTION INTRAVENOUS; SUBCUTANEOUS
Status: DISCONTINUED | OUTPATIENT
Start: 2024-08-25 | End: 2024-08-25

## 2024-08-25 RX ORDER — ASPIRIN 81 MG/1
81 TABLET ORAL DAILY
Status: DISCONTINUED | OUTPATIENT
Start: 2024-08-25 | End: 2024-09-10 | Stop reason: HOSPADM

## 2024-08-25 RX ORDER — LEVETIRACETAM 500 MG/1
1500 TABLET ORAL 2 TIMES DAILY
Status: DISCONTINUED | OUTPATIENT
Start: 2024-08-25 | End: 2024-09-10 | Stop reason: HOSPADM

## 2024-08-25 RX ORDER — GLUCAGON 1 MG
1 KIT INJECTION
Status: DISCONTINUED | OUTPATIENT
Start: 2024-08-25 | End: 2024-09-10 | Stop reason: HOSPADM

## 2024-08-25 RX ORDER — LANOLIN ALCOHOL/MO/W.PET/CERES
400 CREAM (GRAM) TOPICAL DAILY
Status: DISCONTINUED | OUTPATIENT
Start: 2024-08-25 | End: 2024-09-10 | Stop reason: HOSPADM

## 2024-08-25 RX ORDER — IBUPROFEN 200 MG
1 TABLET ORAL DAILY
Status: DISCONTINUED | OUTPATIENT
Start: 2024-08-25 | End: 2024-09-10 | Stop reason: HOSPADM

## 2024-08-25 RX ORDER — IBUPROFEN 200 MG
16 TABLET ORAL
Status: DISCONTINUED | OUTPATIENT
Start: 2024-08-25 | End: 2024-08-25

## 2024-08-25 RX ORDER — POTASSIUM CHLORIDE 7.45 MG/ML
10 INJECTION INTRAVENOUS
Status: COMPLETED | OUTPATIENT
Start: 2024-08-25 | End: 2024-08-25

## 2024-08-25 RX ORDER — GABAPENTIN 400 MG/1
400 CAPSULE ORAL NIGHTLY
Status: DISCONTINUED | OUTPATIENT
Start: 2024-08-25 | End: 2024-09-10 | Stop reason: HOSPADM

## 2024-08-25 RX ORDER — IBUPROFEN 200 MG
24 TABLET ORAL
Status: DISCONTINUED | OUTPATIENT
Start: 2024-08-25 | End: 2024-08-25

## 2024-08-25 RX ORDER — SODIUM,POTASSIUM PHOSPHATES 280-250MG
1 POWDER IN PACKET (EA) ORAL
Status: COMPLETED | OUTPATIENT
Start: 2024-08-25 | End: 2024-08-26

## 2024-08-25 RX ORDER — GLUCAGON 1 MG
1 KIT INJECTION
Status: DISCONTINUED | OUTPATIENT
Start: 2024-08-25 | End: 2024-08-25

## 2024-08-25 RX ORDER — CYCLOBENZAPRINE HCL 5 MG
5 TABLET ORAL DAILY PRN
Status: DISCONTINUED | OUTPATIENT
Start: 2024-08-25 | End: 2024-09-02

## 2024-08-25 RX ORDER — IBUPROFEN 200 MG
24 TABLET ORAL
Status: DISCONTINUED | OUTPATIENT
Start: 2024-08-25 | End: 2024-09-10 | Stop reason: HOSPADM

## 2024-08-25 RX ORDER — POTASSIUM CHLORIDE 20 MEQ/1
40 TABLET, EXTENDED RELEASE ORAL EVERY 4 HOURS
Status: COMPLETED | OUTPATIENT
Start: 2024-08-25 | End: 2024-08-25

## 2024-08-25 RX ORDER — WARFARIN 3 MG/1
3 TABLET ORAL DAILY
Status: DISCONTINUED | OUTPATIENT
Start: 2024-08-25 | End: 2024-08-25

## 2024-08-25 RX ORDER — INSULIN GLARGINE 100 [IU]/ML
16 INJECTION, SOLUTION SUBCUTANEOUS 2 TIMES DAILY
Status: DISCONTINUED | OUTPATIENT
Start: 2024-08-25 | End: 2024-08-26

## 2024-08-25 RX ORDER — SODIUM,POTASSIUM PHOSPHATES 280-250MG
1 POWDER IN PACKET (EA) ORAL
Status: DISCONTINUED | OUTPATIENT
Start: 2024-08-25 | End: 2024-08-25

## 2024-08-25 RX ORDER — ATORVASTATIN CALCIUM 20 MG/1
40 TABLET, FILM COATED ORAL DAILY
Status: DISCONTINUED | OUTPATIENT
Start: 2024-08-25 | End: 2024-09-10 | Stop reason: HOSPADM

## 2024-08-25 RX ADMIN — POTASSIUM CHLORIDE 40 MEQ: 1500 TABLET, EXTENDED RELEASE ORAL at 05:08

## 2024-08-25 RX ADMIN — ASPIRIN 81 MG: 81 TABLET, COATED ORAL at 01:08

## 2024-08-25 RX ADMIN — NICOTINE 1 PATCH: 14 PATCH, EXTENDED RELEASE TRANSDERMAL at 11:08

## 2024-08-25 RX ADMIN — CYCLOBENZAPRINE HYDROCHLORIDE 5 MG: 5 TABLET, FILM COATED ORAL at 08:08

## 2024-08-25 RX ADMIN — PANTOPRAZOLE SODIUM 40 MG: 40 TABLET, DELAYED RELEASE ORAL at 01:08

## 2024-08-25 RX ADMIN — SODIUM CHLORIDE 1000 ML: 9 INJECTION, SOLUTION INTRAVENOUS at 03:08

## 2024-08-25 RX ADMIN — SPIRONOLACTONE 25 MG: 25 TABLET ORAL at 01:08

## 2024-08-25 RX ADMIN — POTASSIUM BICARBONATE 40 MEQ: 391 TABLET, EFFERVESCENT ORAL at 03:08

## 2024-08-25 RX ADMIN — INSULIN GLARGINE 16 UNITS: 100 INJECTION, SOLUTION SUBCUTANEOUS at 09:08

## 2024-08-25 RX ADMIN — INSULIN ASPART 2 UNITS: 100 INJECTION, SOLUTION INTRAVENOUS; SUBCUTANEOUS at 09:08

## 2024-08-25 RX ADMIN — Medication 400 MG: at 01:08

## 2024-08-25 RX ADMIN — HUMAN INSULIN 5 UNITS: 100 INJECTION, SOLUTION SUBCUTANEOUS at 06:08

## 2024-08-25 RX ADMIN — POTASSIUM CHLORIDE 40 MEQ: 1500 TABLET, EXTENDED RELEASE ORAL at 01:08

## 2024-08-25 RX ADMIN — INSULIN ASPART 14 UNITS: 100 INJECTION, SOLUTION INTRAVENOUS; SUBCUTANEOUS at 09:08

## 2024-08-25 RX ADMIN — POTASSIUM CHLORIDE 40 MEQ: 1500 TABLET, EXTENDED RELEASE ORAL at 10:08

## 2024-08-25 RX ADMIN — HUMAN INSULIN 10 UNITS: 100 INJECTION, SOLUTION SUBCUTANEOUS at 03:08

## 2024-08-25 RX ADMIN — HUMAN INSULIN 6 UNITS: 100 INJECTION, SOLUTION SUBCUTANEOUS at 06:08

## 2024-08-25 RX ADMIN — HUMAN INSULIN 6 UNITS: 100 INJECTION, SOLUTION SUBCUTANEOUS at 05:08

## 2024-08-25 RX ADMIN — ACETAMINOPHEN 1000 MG: 500 TABLET ORAL at 07:08

## 2024-08-25 RX ADMIN — GABAPENTIN 400 MG: 400 CAPSULE ORAL at 08:08

## 2024-08-25 RX ADMIN — DULOXETINE HYDROCHLORIDE 30 MG: 30 CAPSULE, DELAYED RELEASE ORAL at 01:08

## 2024-08-25 RX ADMIN — LEVETIRACETAM 1500 MG: 500 TABLET, FILM COATED ORAL at 08:08

## 2024-08-25 RX ADMIN — INSULIN ASPART 8 UNITS: 100 INJECTION, SOLUTION INTRAVENOUS; SUBCUTANEOUS at 01:08

## 2024-08-25 RX ADMIN — INSULIN ASPART 10 UNITS: 100 INJECTION, SOLUTION INTRAVENOUS; SUBCUTANEOUS at 05:08

## 2024-08-25 RX ADMIN — AMLODIPINE BESYLATE 5 MG: 5 TABLET ORAL at 11:08

## 2024-08-25 RX ADMIN — FUROSEMIDE 80 MG: 80 TABLET ORAL at 05:08

## 2024-08-25 RX ADMIN — POTASSIUM & SODIUM PHOSPHATES POWDER PACK 280-160-250 MG 1 PACKET: 280-160-250 PACK at 09:08

## 2024-08-25 RX ADMIN — POTASSIUM CHLORIDE 10 MEQ: 7.46 INJECTION, SOLUTION INTRAVENOUS at 03:08

## 2024-08-25 RX ADMIN — ATORVASTATIN CALCIUM 40 MG: 20 TABLET, FILM COATED ORAL at 01:08

## 2024-08-25 RX ADMIN — GABAPENTIN 100 MG: 100 CAPSULE ORAL at 05:08

## 2024-08-25 NOTE — HPI
Reason for Consult: Management of T2DM, Hyperglycemia     Surgical Procedure and Date: LVAD 06/29/2022     Diabetes diagnosis year: 2022    Home Diabetes Medications:  Levemir 20 units twice daily and Novolog SSI (150/25) per patient    How often checking glucose at home?  Has not checked in a few weeks due to running out of strips    BG readings on regimen: COLLIN  Hypoglycemia on the regimen?  Yes; rarely experiences hypoglycemic symptoms such as blurry vision and vomiting. However, does not know his blood sugar level due to running out of supplies. He will self-correct with a snack.  Missed doses on regimen?  Yes, has not had insulin in a few weeks due to running out of glucometer supplies.    Diabetes Complications include:   Hyperglycemia    Complicating diabetes co morbidities:   History of MI, CHF, and CKD      HPI:   Patient is a 39 y.o. 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 He underwent RVAD removal and chest closure 6/30/2022.  He was weaned off  but restarted due to RVF. He was transitioned to milrinone (secondary to  shortage). History of unclear syncope vs seizures and followed by neuro and is on Keppra. On 11/15/23 underwent removal of eroded [employer] scICD--no Lifevest (due to LVAD) and no plan to replace ICD as not requiring therapy post LVAD with concern for reinfection. Reported back pain (primarily upper back pain) for the past 3-4 days. He reported that it started 2 days after stopping pirmacor and he felt it may be related. Reported some shortness of breath after walking long distances which has been ongoing for months now and has not changed lately. He has lost some weight over last few months since his but weight has been stable over the last month. Patient admitted after being out of [facility]  for 1 week with ongoing back pain. Patient stated he has not taken his insulin in a few weeks due to running out of glucometer testing supplies. He previously had a William, but is not wearing one. BG >700 at [facility]. Endo consulted for BG management.

## 2024-08-25 NOTE — NURSING
LVAD dressing change completed using sterile technique w daily kit DLES is a 1 with scant serous drainage noted on the drain sponge. Tolerated without any complication. no redness, mild tenderness noted at exit site when sample taken for culture

## 2024-08-25 NOTE — ASSESSMENT & PLAN NOTE
Appears Euvolemic on exam     Most recent TTE 9/5/23 with EF 10-15%, mod to sever TR, LVEDd 7.11, LAVD - HM3 in place    -   - Obtain CXR to rule out alternate pathology  - No sick contacts or additional symptoms to suggest need for viral respiratory workup  -C/w home lasix 80 mg daily after replacing potassium  -On Primacor 0.25 at home but ran out 1  week back   -Obtain TTE  - Since has been off primacor without being volume overloaded or significant respiratory symptoms -will wait for TTE to assess for need to order primacor again as he has been non compliant as outpatient with labs and visits  -palliative care to be considered if not going on primacor

## 2024-08-25 NOTE — SUBJECTIVE & OBJECTIVE
Interval HPI:   No acute events overnight. Patient in room 319/319 A. Blood glucose improving. BG above goal on current insulin regimen (SSI ). Steroid use- None.    Renal function- Normal   Lab Results   Component Value Date    CREATININE 1.0 08/25/2024     Vasopressors-  None     Endocrine will continue to follow and manage insulin orders inpatient.     Diet diabetic Cardiac (Low Na/Chol); 2000 Calorie; Fluid - 1500mL; Standard Tray     Eating:    diet advanced today  Nausea: No  Hypoglycemia and intervention: No  Fever: No  TPN and/or TF: No  If yes, type of TF/TPN and rate: N/A    PMH, PSH, FH, SH updated and reviewed     ROS:  Review of Systems   Constitutional:  Positive for unexpected weight change. Negative for fatigue.   Eyes:  Negative for visual disturbance.   Respiratory:  Positive for shortness of breath.    Cardiovascular:  Negative for chest pain.   Gastrointestinal:  Negative for abdominal pain, nausea and vomiting.   Endocrine: Negative for polydipsia, polyphagia and polyuria.   Musculoskeletal:  Positive for back pain.   Neurological:  Negative for dizziness, weakness, numbness and headaches.       Current Medications and/or Treatments Impacting Glycemic Control  Immunotherapy:    Immunosuppressants       None          Steroids:   Hormones (From admission, onward)      None          Pressors:    Autonomic Drugs (From admission, onward)      None          Hyperglycemia/Diabetes Medications:   Antihyperglycemics (From admission, onward)      Start     Stop Route Frequency Ordered    08/25/24 2100  insulin glargine U-100 (Lantus) pen 16 Units         -- SubQ 2 times daily 08/25/24 1407    08/25/24 1645  insulin aspart U-100 pen 14 Units         -- SubQ 3 times daily with meals 08/25/24 1407    08/25/24 1506  insulin aspart U-100 pen 0-10 Units         -- SubQ Before meals & nightly PRN 08/25/24 1407             PHYSICAL EXAMINATION:  Vitals:    08/25/24 1331   BP: (!) 88/0   Pulse:    Resp:    Temp:       Body mass index is 19.12 kg/m².     Physical Exam  Constitutional:       General: He is not in acute distress.     Appearance: Normal appearance.   HENT:      Head: Normocephalic and atraumatic.      Nose: Nose normal.   Eyes:      Conjunctiva/sclera: Conjunctivae normal.   Cardiovascular:      Rate and Rhythm: Normal rate.   Pulmonary:      Effort: Pulmonary effort is normal. No respiratory distress.   Abdominal:      General: There is no distension.   Musculoskeletal:      Right lower leg: No edema.      Left lower leg: No edema.   Neurological:      Mental Status: He is alert.   Psychiatric:         Mood and Affect: Mood normal.         Behavior: Behavior normal.         Thought Content: Thought content normal.         Judgment: Judgment normal.

## 2024-08-25 NOTE — ASSESSMENT & PLAN NOTE
Endocrinology consulted for BG management.   BG goal 140-180  Noncompliant T2DM. BG >700 at [facility] and received a total of 27 units of insulin regular.    - Start Lantus (Insulin Glargine) 16 units BID (20% decrease from home regimen)  - Start Novolog (Insulin Aspart) 14 units TIDWM (20% decrease from home regimen)  - Start moderate dose correction (150/25)  - BG checks AC/HS  - Hypoglycemia protocol in place    ** Please notify Endocrine for any change and/or advance in diet**  ** Please call Endocrine for any BG related issues **    Discharge Planning:   TBD. Please notify endocrinology prior to discharge.  Will likely resume home regimen.  Patient requires glucometer and testing supplies.  Recommend he resumes his William for blood sugar monitoring.

## 2024-08-25 NOTE — HPI
Mr. Kevan Thacker is a 39 year old male with stage D 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 He underwent RVAD removal and chest closure 6/30/2022.  He was weaned off  but he had to restarted due to RVF. He was eventually transitioned to milrinone (secondary to  shortage) now supposed to be on 0.25 mcg/kg/min. He has a history of unclear syncope vs seizures and is followed by neuro for this and is on Keppra.  He is being admitted after being out of primacor for 1 week with ongoing back pain.      On 11/15/23 underwent removal of eroded [employer] scICD--no Lifevest (due to LVAD) and no plan to replace ICD as not requiring therapy post LVAD with concern for reinfection.  He has not had neurology f/u with seizure hx on keppra so coordinator to assist him with obtaining appt.      Reports back pain (primarily upper back pain) for the past 3-4 days. He reports that it started 2 days after stopping pirmacor and he feels it may be related. He has also been sleeping in the couch and identifies that it may also be related to the back pain. He denies lifting anything heavy or any falls /trauma. No red flags including incontinence of stool or urine. No numbness in the groin area or weakness in the legs or upper extremity reported. He reports he was not able to go for appointment because of transport issue that has now been resolved. He denies orthopnea, PND, weight gain, leg swelling. He says that he does have some shortness of breath after walking long distances which has been ongoing for months now and has not changed lately. He has lost some weight over last few months since his but weight has been stable over the last month. He denied headache, constipation, diarrhea, SOB,  cough, palpitations or any additional symptoms.

## 2024-08-25 NOTE — ED PROVIDER NOTES
Encounter Date: 8/25/2024    SCRIBE #1 NOTE: I, Poli Saravia, am scribing for, and in the presence of,  Andres Kahn MD. I have scribed the following portions of the note - Other sections scribed: HPI,ROS,PE.       History     Chief Complaint   Patient presents with    IV Medication     Pt. States that he receives Primacor through his PICC line, but has been out of his Primacor x 5 days. States PCP told him to come to the ED. Denies fever. Extensive medical hx, including CHF and LVAD      40 y/o male with PMHx of CHF, DM, drug abuse, ICD, and LVAD in place presents to ED for IV medication. Pt reports he has been out of his Primacor for 1x week. He reports he has been unable to get any due to transportation issues. He complains of back pain, headaches, and SOB with exertion.     The history is provided by the patient and medical records. No  was used.     Review of patient's allergies indicates:   Allergen Reactions    Bumex [bumetanide] Hives    Torsemide Hives     Past Medical History:   Diagnosis Date    Acute respiratory failure with hypoxia 07/22/2023    Arthritis     Awareness alteration, transient 09/01/2022    Cardiomyopathy     CHF (congestive heart failure) 10/01/2020    COVID-19 06/03/2023    Diabetes mellitus     Dilated cardiomyopathy 10/26/2020    Drug abuse 10/2020    Headache 04/19/2023    Hyperglycemia 12/16/2022    Hyperosmolar hyperglycemic state (HHS) 05/25/2022    ICD (implantable cardioverter-defibrillator) in place 10/26/2020    Infected defibrillator now s/p removal Nov 2023 07/23/2023    Left ventricular assist device (LVAD) complication, initial encounter 06/05/2023    Muscle cramping 06/15/2022    Renal disorder     SOB (shortness of breath) 06/13/2022    Tingling in extremities 07/13/2022     Past Surgical History:   Procedure Laterality Date    APPLICATION OF WOUND VACUUM-ASSISTED CLOSURE DEVICE N/A 6/30/2022    Procedure: APPLICATION, WOUND VAC;  Surgeon: Luis F  MD Grecia;  Location: [facility] OR [facility] FLR;  Service: Cardiovascular;  Laterality: N/A;  50 x 5 cm    CARDIAC DEFIBRILLATOR PLACEMENT      ECHOCARDIOGRAM,TRANSESOPHAGEAL  11/9/2023    Procedure: Transesophageal echo (GUILLERMO) intra-procedure log documentation;  Surgeon: Eron Ivy MD;  Location: [facility] EP LAB;  Service: Cardiology;;    EXTRACTION, ELECTRODE LEAD Left 11/9/2023    Procedure: EXTRACTION, ELECTRODE LEAD;  Surgeon: ADALID Leon MD;  Location: [facility] EP LAB;  Service: Cardiology;  Laterality: Left;  INFECTION, LEAD EXTRACTION, GUILLERMO, BSCI, ANES, EH,   *NO CTS BACKUP*    IMPLANTATION OF RIGHT VENTRICULAR ASSIST DEVICE (RVAD) N/A 6/29/2022    Procedure: INSERTION, RVAD;  Surgeon: Luis F Paige MD;  [facility];  Service: Cardiovascular;  Laterality: N/A;    INSERTION OF GRAFT TO PERICARDIUM Right 6/30/2022    Procedure: INSERTION-RIGHT VENTRICULAR ASSIST DEVICE;  Surgeon: Luis F Paige MD;  [facility];  Service: Cardiovascular;  Laterality: Right;    IRRIGATION OF MEDIASTINUM  6/30/2022    Procedure: IRRIGATION, MEDIASTINUM;  Surgeon: Luis F Paige MD;  [facility];  Service: Cardiovascular;;    LEFT VENTRICULAR ASSIST DEVICE Left 6/23/2022    Procedure: INSERTION-LEFT VENTRICULAR ASSIST DEVICE;  Surgeon: Luis F Paige MD;  [facility];  Service: Cardiovascular;  Laterality: Left;    LEFT VENTRICULAR ASSIST DEVICE N/A 6/29/2022    Procedure: INSERTION-LEFT VENTRICULAR ASSIST DEVICE;  Surgeon: Luis F Paige MD;  [facility];  Service: Cardiovascular;  Laterality: N/A;    REVISION OF SKIN POCKET FOR CARDIOVERTER-DEFIBRILLATOR  11/9/2023    Procedure: REVISION, SKIN POCKET, FOR CARDIOVERTER-DEFIBRILLATOR;  Surgeon: ADALID Leon MD;  Location: [facility] EP LAB;  Service: Cardiology;;    RIGHT HEART CATHETERIZATION Right 4/8/2022    Procedure: INSERTION, CATHETER, RIGHT HEART;  Surgeon: Luca Lopez Jr., MD;  Location:  [facility] CATH LAB;  Service: Cardiology;  Laterality: Right;    RIGHT HEART CATHETERIZATION Right 4/19/2022    Procedure: INSERTION, CATHETER, RIGHT HEART;  Surgeon: Josh Pulido MD;  Location: [facility] CATH LAB;  Service: Cardiology;  Laterality: Right;    RIGHT HEART CATHETERIZATION Right 7/21/2022    Procedure: INSERTION, CATHETER, RIGHT HEART;  Surgeon: Dalia Crum MD;  Location: [facility] CATH LAB;  Service: Cardiology;  Laterality: Right;    RIGHT HEART CATHETERIZATION Right 10/31/2022    Procedure: INSERTION, CATHETER, RIGHT HEART;  Surgeon: Dalia Crum MD;  Location: [facility] CATH LAB;  Service: Cardiology;  Laterality: Right;    STERNAL WOUND CLOSURE N/A 6/30/2022    Procedure: CLOSURE, WOUND, STERNUM;  Surgeon: Luis F Paige MD;  Location: [facility] OR [address] [place] [address];  Service: Cardiovascular;  Laterality: N/A;     Family History   Problem Relation Name Age of Onset    Heart failure Father      Diverticulosis Brother      Heart attack Maternal Grandmother      Heart attack Maternal Grandfather       Social History     Tobacco Use    Smoking status: Former     Current packs/day: 0.00     Average packs/day: 0.5 packs/day for 16.6 years (8.3 ttl pk-yrs)     Types: Cigarettes     Start date: 10/1/2004     Quit date: 4/23/2021     Years since quitting: 3.3    Smokeless tobacco: Never   Substance Use Topics    Alcohol use: Not Currently    Drug use: Not Currently     Types: Marijuana, MDMA (Ecstacy)     Review of Systems   Constitutional:  Negative for chills and fever.   Respiratory:  Positive for shortness of breath. Negative for cough.    Cardiovascular:  Negative for chest pain.   Gastrointestinal:  Negative for abdominal pain, nausea and vomiting.   Musculoskeletal:  Positive for back pain. Negative for myalgias.   Neurological:  Positive for headaches. Negative for syncope.   All other systems reviewed and are negative.      Physical Exam     Initial Vitals   BP Pulse Resp Temp SpO2   08/25/24 0052 08/25/24 0112  08/25/24 0052 08/25/24 0052 08/25/24 0052   (!) 129/90 104 16 98 °F (36.7 °C) 99 %      MAP       --                Physical Exam    Constitutional: He appears well-developed and well-nourished. No distress.   HENT:   Head: Normocephalic and atraumatic.   Cardiovascular:            Pulses:       Radial pulses are 1+ on the left side.   Mechanical home present on cardiac exam   Pulmonary/Chest: No respiratory distress. He has no wheezes. He has no rhonchi. He exhibits no tenderness.   Abdominal: Abdomen is soft. He exhibits no distension. There is no abdominal tenderness.   Bandage over LVAD site.  There is no rebound and no guarding.   Musculoskeletal:         General: Normal range of motion.      Comments: PICC line to left arm.      Neurological: He is alert and oriented to person, place, and time. He has normal strength.   Skin: Skin is warm and dry.   Psychiatric: He has a normal mood and affect.         ED Course   Procedures  Labs Reviewed   COMPREHENSIVE METABOLIC PANEL - Abnormal       Result Value    Sodium 128 (*)     Potassium 3.3 (*)     Chloride 87 (*)     CO2 27      Glucose 776 (*)     Blood Urea Nitrogen 12.7      Creatinine 1.33 (*)     Calcium 9.0      Protein Total 8.6 (*)     Albumin 2.6 (*)     Globulin 6.0 (*)     Albumin/Globulin Ratio 0.4 (*)     Bilirubin Total 2.6 (*)      (*)     ALT 17      AST 29      eGFR >60      Anion Gap 14.0      BUN/Creatinine Ratio 10     CBC WITH DIFFERENTIAL - Abnormal    WBC 9.87      RBC 4.55 (*)     Hgb 8.7 (*)     Hct 29.6 (*)     MCV 65.1 (*)     MCH 19.1 (*)     MCHC 29.4 (*)     RDW 27.9 (*)     Platelet 218      MPV        NRBC% 0.0      IPF 7.9     MANUAL DIFFERENTIAL - Abnormal    WBC 9.87      Neutrophils % 90      Lymphs % 6      Monocytes % 3      Basophils % 1      Neutrophils Abs 8.883      Lymphs Abs 0.5922 (*)     Monocytes Abs 0.2961      Basophils Abs 0.0987      Platelets Normal      RBC Morph Abnormal (*)     Poikilocytosis 2+ (*)      Anisocytosis 2+ (*)     Macrocytosis 2+ (*)     Lyons Cells 1+ (*)     Target Cells 2+ (*)    URINALYSIS, REFLEX TO URINE CULTURE - Abnormal    Color, UA Colorless      Appearance, UA Clear      Specific Gravity, UA 1.012      pH, UA 7.0      Protein, UA Negative      Glucose, UA 4+ (*)     Ketones, UA Negative      Blood, UA Trace (*)     Bilirubin, UA Negative      Urobilinogen, UA 2.0 (*)     Nitrites, UA Negative      Leukocyte Esterase, UA Negative      RBC, UA 6-10 (*)     WBC, UA None Seen      Bacteria, UA None Seen      Squamous Epithelial Cells, UA None Seen     BLOOD GAS - Abnormal    Sample Type Venous Blood      Drawn by jose yeboah      pH, Blood gas 7.480      pCO2, Blood gas 47.0      pO2, Blood gas 51.0      Sodium, Blood Gas 128 (*)     Potassium, Blood Gas 4.4      Calcium Level Ionized 1.06 (*)     TOC2, Blood gas 36.4      Base Excess, Blood gas 10.30      sO2, Blood gas 88.4      HCO3, Blood gas 35.0      THb, Blood gas 9.7      O2 Hb, Blood Gas 78.6      CO Hgb 6.2      Met Hgb 1.0      Allens Test N/A      FIO2, Blood gas 21     POCT GLUCOSE - Abnormal    POCT Glucose >500 (*)    POCT GLUCOSE - Abnormal    POCT Glucose >500 (*)    POCT GLUCOSE - Abnormal    POCT Glucose >500 (*)    POCT GLUCOSE - Abnormal    POCT Glucose >500 (*)    POCT GLUCOSE - Abnormal    POCT Glucose 500 (*)    POCT GLUCOSE - Abnormal    POCT Glucose >500 (*)    POCT GLUCOSE - Abnormal    POCT Glucose 464 (*)    COVID/FLU A&B PCR - Normal    Influenza A PCR Not Detected      Influenza B PCR Not Detected      SARS-CoV-2 PCR Not Detected      Narrative:     The Xpert Xpress SARS-CoV-2/FLU/RSV plus is a rapid, multiplexed real-time PCR test intended for the simultaneous qualitative detection and differentiation of SARS-CoV-2, Influenza A, Influenza B, and respiratory syncytial virus (RSV) viral RNA in either nasopharyngeal swab or nasal swab specimens.         MAGNESIUM - Normal    Magnesium Level 1.60     BETA -  HYDROXYBUTYRATE, SERUM - Normal    Beta Hydroxybutyrate 0.10     DRUG SCREEN, URINE (BEAKER) - Normal    Amphetamines, Urine Negative      Barbiturates, Urine Negative      Benzodiazepine, Urine Negative      Cannabinoids, Urine Negative      Cocaine, Urine Negative      Fentanyl, Urine Negative      MDMA, Urine Negative      Opiates, Urine Negative      Phencyclidine, Urine Negative      pH, Urine 7.0      Specific Gravity, Urine Auto 1.012      Narrative:     Cut off concentrations:    Amphetamines - 1000 ng/ml  Barbiturates - 200 ng/ml  Benzodiazepine - 200 ng/ml  Cannabinoids (THC) - 50 ng/ml  Cocaine - 300 ng/ml  Fentanyl - 1.0 ng/ml  MDMA - 500 ng/ml  Opiates - 300 ng/ml   Phencyclidine (PCP) - 25 ng/ml    Specimen submitted for drug analysis and tested for pH and specific gravity in order to evaluate sample integrity. Suspect tampering if specific gravity is <1.003 and/or pH is not within the range of 4.5 - 8.0  False negatives may result form substances such as bleach added to urine.  False positives may result for the presence of a substance with similar chemical structure to the drug or its metabolite.    This test provides only a PRELIMINARY analytical test result. A more specific alternate chemical method must be used in order to obtain a confirmed analytical result. Gas chromatography/mass spectrometry (GC/MS) is the preferred confirmatory method. Other chemical confirmation methods are available. Clinical consideration and professional judgement should be applied to any drug of abuse test result, particularly when preliminary positive results are used.    Positive results will be confirmed only at the physicians request. Unconfirmed screening results are to be used only for medical purposes (treatment).        ALCOHOL,MEDICAL (ETHANOL) - Normal    Ethanol Level <10.0     CBC W/ AUTO DIFFERENTIAL    Narrative:     The following orders were created for panel order CBC auto differential.  Procedure                                Abnormality         Status                     ---------                               -----------         ------                     CBC with Differential[[id number]]       Abnormal            Final result               Manual Differential[[id number]]         Abnormal            Final result                 Please view results for these tests on the individual orders.          Imaging Results              X-Ray Chest AP Portable (Final result)  Result time 08/25/24 10:26:29      Final result by Matt Humphries MD (08/25/24 10:26:29)                   Impression:      No acute cardiopulmonary process identified.      Electronically signed by: Matt Humphries  Date:    08/25/2024  Time:    10:26               Narrative:    EXAMINATION:  XR CHEST AP PORTABLE    CLINICAL HISTORY:  Weakness    TECHNIQUE:  One    COMPARISON:  May 20, 2024.    FINDINGS:  Cardiopericardial silhouette enlarged appearance similar.  Sternotomy changes.  LVAD unchanged.  PICC line terminates within the superior vena cava.  Lungs are without dense focal or segmental consolidation, congestive process, significant pleural effusions or pneumothorax.                                       Medications   sodium chloride 0.9% bolus 1,000 mL 1,000 mL (0 mLs Intravenous Stopped 8/25/24 0426)   potassium bicarbonate disintegrating tablet 40 mEq (40 mEq Oral Given 8/25/24 0327)   potassium chloride 10 mEq in 100 mL IVPB (0 mEq Intravenous Stopped 8/25/24 0615)   insulin regular injection 10 Units 0.1 mL (10 Units Subcutaneous Given 8/25/24 0355)   insulin regular injection 6 Units 0.06 mL (6 Units Intravenous Given 8/25/24 0504)   insulin regular injection 6 Units 0.06 mL (6 Units Intravenous Given 8/25/24 0602)   insulin regular injection 5 Units 0.05 mL (5 Units Intravenous Given 8/25/24 0648)   acetaminophen tablet 1,000 mg (1,000 mg Oral Given 8/25/24 6744)     Medical Decision Making  The differential diagnosis includes, but is  not limited to, medication non-compliance, heart failure, and electrolyte abnormality.     Amount and/or Complexity of Data Reviewed  Labs: ordered. Decision-making details documented in ED Course.  Radiology: ordered.    Risk  OTC drugs.  Prescription drug management.            Scribe Attestation:   Scribe #1: I performed the above scribed service and the documentation accurately describes the services I performed. I attest to the accuracy of the note.    Attending Attestation:           Physician Attestation for Scribe:  Physician Attestation Statement for Scribe #1: I, Andres Kahn MD, reviewed documentation, as scribed by Poli Saravia in my presence, and it is both accurate and complete.             ED Course as of 08/25/24 2229   Sun Aug 25, 2024   0222 MAP 90 [LF]   0341 Hyperglycemia.  No anion gap.  PH normal beta hydroxybutyrate negative.  Patient was not in DKA.  Getting 1 L of IV fluids here.  Potassium 3.3 given p.o. and IV potassium given does have insulin as well [LF]   0515 Patient was glucose still over 500 after 10 units of insulin.  Subsequently given 60 units IV insulin.  Discussed with Dr. Henson at shift change. Plan to transfer once bed is available and glucose less than 500. [LF]   0549 Patient's glucose still over 500.  We will repeat 6 units IV. [MM]   0737 Glucose 464, suitable for transfer [MM]      ED Course User Index  [LF] Andres Kahn MD  [MM] Osmani Henson MD       I discussed case with the LVAD coordinator.  Patient was accepted by Dr. Pulido, case discussed with him as well.  She was 1st center will notify us when a bed is available.  They do not recommend restarting Milrinone.                      Clinical Impression:  Final diagnoses:  [R53.1] General weakness  [I50.9] Acute on chronic heart failure, unspecified heart failure type (Primary)  [R73.9] Acute hyperglycemia  [E87.6] Hypokalemia  [Z91.199] History of noncompliance with medical treatment          ED  Disposition Condition    Transfer to Another Facility Stable                Andres Kahn MD  08/25/24 9784       Andres Kahn MD  08/25/24 6468

## 2024-08-25 NOTE — PROGRESS NOTES
08/25/2024  Ruma Li    Current provider:  Josh Ryan, *    Device interrogation:      8/25/2024    11:02 AM 5/26/2024     8:00 PM 5/26/2024     3:26 PM 5/26/2024    11:53 AM 5/26/2024     8:13 AM 5/26/2024     4:30 AM 5/25/2024    11:00 PM   TXP LVAD INTERROGATIONS   Type HeartMate3 HeartMate3 HeartMate3 HeartMate3 HeartMate3 HeartMate3 HeartMate3   Flow 3.8 4.6 4.6 4.5 4.3 4.4 4   Speed 5100 5100 5100 5100 5100 5150 5100   PI 6.2 3.8 3.8 4.1 5.1 4.4 5.6   Power (Serna) 3.5 3.6 3.6 3.6 3.6 3.6 3.6   LSL 4700 4700 4700 4700 4700     Pulsatility      Intermittent pulse Intermittent pulse          Rounded on Kevan Queen to ensure all mechanical assist device settings (IABP or VAD) were appropriate and all parameters were within limits.  I was able to ensure all back up equipment was present, the staff had no issues, and the Perfusion Department daily rounding was complete.      For implantable VADs: Interrogation of Ventricular assist device was performed with analysis of device parameters and review of device function. I have personally reviewed the interrogation findings and agree with findings as stated.     In emergency, the nursing units have been notified to contact the perfusion department either by:  Calling [phone] from 630am to 4pm Mon thru Fri, utilizing the On-Call Finder functionality of Epic and searching for Perfusion, or by contacting the hospital  from 4pm to 630am and on weekends and asking to speak with the perfusionist on call.    1:17 PM

## 2024-08-25 NOTE — ASSESSMENT & PLAN NOTE
Mots likely related to sleeping on the couch  NO red flag symptoms on admission  Xray C/T and L spine  C/w home gabapentin

## 2024-08-25 NOTE — H&P
[facility] - Cardiology Stepdown  Heart Transplant  H&P    Patient Name: Kevan Queen  MRN: [id number]  Admission Date: 8/25/2024  Attending Physician: Josh Ryan, *  Primary Care Provider: Rosio Armendariz FNP  Principal Problem:LVAD (left ventricular assist device) present    Subjective:     History of Present Illness:  Mr. Kevan Thacker is a 39 year old male with stage D 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 He underwent RVAD removal and chest closure 6/30/2022.  He was weaned off  but he had to restarted due to RVF. He was eventually transitioned to milrinone (secondary to  shortage) now supposed to be on 0.25 mcg/kg/min. He has a history of unclear syncope vs seizures and is followed by neuro for this and is on Keppra.  He is being admitted after being out of [facility] for 1 week with ongoing back pain.      On 11/15/23 underwent removal of eroded [employer] scICD--no Lifevest (due to LVAD) and no plan to replace ICD as not requiring therapy post LVAD with concern for reinfection.  He has not had neurology f/u with seizure hx on keppra so coordinator to assist him with obtaining appt.      Reports back pain (primarily upper back pain) for the past 3-4 days. He reports that it started 2 days after stopping pirmacor and he feels it may be related. He has also been sleeping in the couch and identifies that it may also be related to the back pain. He denies lifting anything heavy or any falls /trauma. No red flags including incontinence of stool or urine. No numbness in the groin area or weakness in the legs or upper extremity reported. He reports he was not able to go for appointment because of transport issue that has now been resolved. He denies  orthopnea, PND, weight gain, leg swelling. He says that he does have some shortness of breath after walking long distances which has been ongoing for months now and has not changed lately. He has lost some weight over last few months since his but weight has been stable over the last month. He denied headache, constipation, diarrhea, SOB, cough, palpitations or any additional symptoms.     Past Medical History:   Diagnosis Date    Acute respiratory failure with hypoxia 07/22/2023    Arthritis     Awareness alteration, transient 09/01/2022    Cardiomyopathy     CHF (congestive heart failure) 10/01/2020    COVID-19 06/03/2023    Diabetes mellitus     Dilated cardiomyopathy 10/26/2020    Drug abuse 10/2020    Headache 04/19/2023    Hyperglycemia 12/16/2022    Hyperosmolar hyperglycemic state (HHS) 05/25/2022    ICD (implantable cardioverter-defibrillator) in place 10/26/2020    Infected defibrillator now s/p removal Nov 2023 07/23/2023    Left ventricular assist device (LVAD) complication, initial encounter 06/05/2023    Muscle cramping 06/15/2022    Renal disorder     SOB (shortness of breath) 06/13/2022    Tingling in extremities 07/13/2022       Past Surgical History:   Procedure Laterality Date    APPLICATION OF WOUND VACUUM-ASSISTED CLOSURE DEVICE N/A 6/30/2022    Procedure: APPLICATION, WOUND VAC;  Surgeon: Luis F Paige MD;  Location: [facility] OR [address] [place] [address];  Service: Cardiovascular;  Laterality: N/A;  50 x 5 cm    CARDIAC DEFIBRILLATOR PLACEMENT      ECHOCARDIOGRAM,TRANSESOPHAGEAL  11/9/2023    Procedure: Transesophageal echo (GUILLERMO) intra-procedure log documentation;  Surgeon: Eron Ivy MD;  Location: [facility] EP LAB;  Service: Cardiology;;    EXTRACTION, ELECTRODE LEAD Left 11/9/2023    Procedure: EXTRACTION, ELECTRODE LEAD;  Surgeon: ADALID Leon MD;  Location: [facility] EP LAB;  Service: Cardiology;  Laterality: Left;  INFECTION, LEAD EXTRACTION, GUILLERMO, BSCI, ANES, EH,   *NO CTS BACKUP*    IMPLANTATION OF  RIGHT VENTRICULAR ASSIST DEVICE (RVAD) N/A 6/29/2022    Procedure: INSERTION, RVAD;  Surgeon: Luis F Paige MD;  Location: [facility] OR 2ND FLR;  Service: Cardiovascular;  Laterality: N/A;    INSERTION OF GRAFT TO PERICARDIUM Right 6/30/2022    Procedure: INSERTION-RIGHT VENTRICULAR ASSIST DEVICE;  Surgeon: Luis F Paige MD;  Location: [facility] OR 2ND FLR;  Service: Cardiovascular;  Laterality: Right;    IRRIGATION OF MEDIASTINUM  6/30/2022    Procedure: IRRIGATION, MEDIASTINUM;  Surgeon: Luis F Paige MD;  Location: [facility] OR 2ND FLR;  Service: Cardiovascular;;    LEFT VENTRICULAR ASSIST DEVICE Left 6/23/2022    Procedure: INSERTION-LEFT VENTRICULAR ASSIST DEVICE;  Surgeon: Luis F Paige MD;  Location: [facility] OR [facility] FLR;  Service: Cardiovascular;  Laterality: Left;    LEFT VENTRICULAR ASSIST DEVICE N/A 6/29/2022    Procedure: INSERTION-LEFT VENTRICULAR ASSIST DEVICE;  Surgeon: Luis F Paige MD;  Location: [facility] OR 2ND FLR;  Service: Cardiovascular;  Laterality: N/A;    REVISION OF SKIN POCKET FOR CARDIOVERTER-DEFIBRILLATOR  11/9/2023    Procedure: REVISION, SKIN POCKET, FOR CARDIOVERTER-DEFIBRILLATOR;  Surgeon: ADALID Leon MD;  Location: [facility] EP LAB;  Service: Cardiology;;    RIGHT HEART CATHETERIZATION Right 4/8/2022    Procedure: INSERTION, CATHETER, RIGHT HEART;  Surgeon: Luca Lopez Jr., MD;  Location: [facility] CATH LAB;  Service: Cardiology;  Laterality: Right;    RIGHT HEART CATHETERIZATION Right 4/19/2022    Procedure: INSERTION, CATHETER, RIGHT HEART;  Surgeon: Josh Pulido MD;  Location: [facility] CATH LAB;  Service: Cardiology;  Laterality: Right;    RIGHT HEART CATHETERIZATION Right 7/21/2022    Procedure: INSERTION, CATHETER, RIGHT HEART;  Surgeon: Dalia Crum MD;  Location: [facility] CATH LAB;  Service: Cardiology;  Laterality: Right;    RIGHT HEART CATHETERIZATION Right 10/31/2022    Procedure: INSERTION, CATHETER, RIGHT HEART;  Surgeon: Dalia Crum MD;  Location: [facility] CATH LAB;  Service:  Cardiology;  Laterality: Right;    STERNAL WOUND CLOSURE N/A 6/30/2022    Procedure: CLOSURE, WOUND, STERNUM;  Surgeon: Luis F Paige MD;  Location: [facility] OR [address] [place] [address];  Service: Cardiovascular;  Laterality: N/A;       Review of patient's allergies indicates:   Allergen Reactions    Bumex [bumetanide] Hives    Torsemide Hives       Current Facility-Administered Medications   Medication    amLODIPine tablet 5 mg    aspirin EC tablet 81 mg    atorvastatin tablet 40 mg    cyclobenzaprine tablet 5 mg    dextrose 10% bolus 125 mL 125 mL    dextrose 10% bolus 250 mL 250 mL    DULoxetine DR capsule 30 mg    furosemide tablet 80 mg    gabapentin capsule 100 mg    gabapentin capsule 400 mg    glucagon (human recombinant) injection 1 mg    glucose chewable tablet 16 g    glucose chewable tablet 24 g    insulin aspart U-100 pen 0-10 Units    insulin aspart U-100 pen 14 Units    insulin glargine U-100 (Lantus) pen 16 Units    levETIRAcetam tablet 1,500 mg    magnesium oxide tablet 400 mg    nicotine 14 mg/24 hr 1 patch    ondansetron disintegrating tablet 4 mg    pantoprazole EC tablet 40 mg    potassium chloride SA CR tablet 40 mEq    potassium, sodium phosphates 280-160-250 mg packet 1 packet    spironolactone tablet 25 mg     Family History       Problem Relation (Age of Onset)    Diverticulosis Brother    Heart attack Maternal Grandmother, Maternal Grandfather    Heart failure Father          Tobacco Use    Smoking status: Former     Current packs/day: 0.00     Average packs/day: 0.5 packs/day for 16.6 years (8.3 ttl pk-yrs)     Types: Cigarettes     Start date: 10/1/2004     Quit date: 4/23/2021     Years since quitting: 3.3    Smokeless tobacco: Never   Substance and Sexual Activity    Alcohol use: Not Currently    Drug use: Not Currently     Types: Marijuana, MDMA (Ecstacy)    Sexual activity: Yes     Partners: Female     Birth control/protection: None     Review of Systems   Constitutional:  Negative for activity change,  appetite change and chills.   HENT:  Negative for congestion, ear discharge and facial swelling.    Eyes:  Negative for discharge and itching.   Respiratory:  Positive for shortness of breath. Negative for apnea, cough, choking and chest tightness.    Cardiovascular:  Negative for chest pain, palpitations and leg swelling.   Gastrointestinal:  Negative for abdominal distention, abdominal pain, constipation and diarrhea.   Genitourinary:  Negative for difficulty urinating and dysuria.   Musculoskeletal:  Positive for back pain. Negative for arthralgias.   Neurological:  Negative for dizziness.   Psychiatric/Behavioral:  Negative for agitation, behavioral problems and confusion.      Objective:     Vital Signs (Most Recent):  Temp: 97.6 °F (36.4 °C) (08/25/24 1255)  Pulse: 100 (08/25/24 1255)  Resp: 18 (08/25/24 1255)  BP: (!) 88/0 (08/25/24 1331)  SpO2: 98 % (08/25/24 1255) Vital Signs (24h Range):  Temp:  [97.6 °F (36.4 °C)-98 °F (36.7 °C)] 97.6 °F (36.4 °C)  Pulse:  [] 100  Resp:  [12-19] 18  SpO2:  [96 %-100 %] 98 %  BP: ()/(0-90) 88/0     Patient Vitals for the past 72 hrs (Last 3 readings):   Weight   08/25/24 1255 69.4 kg (153 lb)   08/25/24 1123 69.4 kg (153 lb)     Body mass index is 19.12 kg/m².      Intake/Output Summary (Last 24 hours) at 8/25/2024 1414  Last data filed at 8/25/2024 1334  Gross per 24 hour   Intake --   Output 600 ml   Net -600 ml          Physical Exam  Constitutional:       Appearance: Normal appearance. He is normal weight.   HENT:      Head: Normocephalic and atraumatic.      Right Ear: External ear normal.      Left Ear: External ear normal.      Mouth/Throat:      Pharynx: Oropharynx is clear.   Eyes:      Conjunctiva/sclera: Conjunctivae normal.   Neck:      Comments: Tenderness to palpation on upper back and scapula bilaterally.   Cardiovascular:      Rate and Rhythm: Normal rate and regular rhythm.      Comments: Smooth VAD hum  Pulmonary:      Effort: Pulmonary effort  is normal. No respiratory distress.      Breath sounds: Normal breath sounds.   Abdominal:      General: There is no distension.      Palpations: Abdomen is soft.      Comments: Dressing in place over drive line exit site, clean and dry   Musculoskeletal:         General: No swelling.      Cervical back: Normal range of motion. No rigidity.      Right lower leg: No edema.      Left lower leg: No edema.   Neurological:      General: No focal deficit present.      Mental Status: He is alert and oriented to person, place, and time.   Psychiatric:         Mood and Affect: Mood normal.         Behavior: Behavior normal.            Significant Labs:  CBC:  Recent Labs   Lab 08/25/24  0215 08/25/24  1150   WBC 9.87  9.87 9.96   RBC 4.55* 4.41*   HGB 8.7* 8.5*   HCT 29.6* 29.6*    215   MCV 65.1* 67*   MCH 19.1* 19.3*   MCHC 29.4* 28.7*     BNP:  Recent Labs   Lab 08/25/24  1150   *     CMP:  Recent Labs   Lab 08/25/24  0215 08/25/24  1150   GLU  --  295*   CALCIUM 9.0 8.8   ALBUMIN 2.6* 2.4*   PROT  --  7.9   * 133*   K 3.3* 2.7*   CO2 27 31*   CL 87* 92*   BUN 12.7 11   CREATININE 1.33* 1.0   ALKPHOS 208* 207*   ALT 17 14   AST 29 24   BILITOT 2.6* 2.3*      Coagulation:   Recent Labs   Lab 08/25/24  1150   INR 4.7*   APTT 49.5*     LDH:  Recent Labs   Lab 08/25/24  1150   *     Microbiology:  Microbiology Results (last 7 days)       Procedure Component Value Units Date/Time    Culture, Anaerobe [[id number]]     Order Status: No result Specimen: Wound from Abdomen     Aerobic culture [[id number]]     Order Status: No result Specimen: Wound from Abdomen             I have reviewed all pertinent labs within the past 24 hours.    Diagnostic Results:  I have reviewed and interpreted all pertinent imaging results/findings within the past 24 hours.    Assessment/Plan:     * LVAD (left ventricular assist device) present  -HeartMate 3 Implanted  6/29/2022  as DT  -HTS Primary  -HOLD Coumadin, Goal INR  2.0-2.5. Supratherapeutic today.   -Antiplatelets ASA 81 mg  -LDH is stable overall today. Will continue to monitor daily.  -Speed set at 5100, LSL 4700 rpm  -Interrogation notable for  power cable disconnected, low voltage advisory, PI events  -Not listed for OHTx      Procedure: Device Interrogation Including analysis of device parameters  Current Settings: Ventricular Assist Device  Review of device function is stable/unstable stable        8/25/2024    11:02 AM 5/26/2024     8:00 PM 5/26/2024     3:26 PM 5/26/2024    11:53 AM 5/26/2024     8:13 AM 5/26/2024     4:30 AM 5/25/2024    11:00 PM   TXP LVAD INTERROGATIONS   Type HeartMate3 HeartMate3 HeartMate3 HeartMate3 HeartMate3 HeartMate3 HeartMate3   Flow 3.8 4.6 4.6 4.5 4.3 4.4 4   Speed 5100 5100 5100 5100 5100 5150 5100   PI 6.2 3.8 3.8 4.1 5.1 4.4 5.6   Power (Serna) 3.5 3.6 3.6 3.6 3.6 3.6 3.6   LSL 4700 4700 4700 4700 4700     Pulsatility      Intermittent pulse Intermittent pulse         CHF (NYHA class IV, ACC/AHA stage D)  Appears Euvolemic on exam     Most recent TTE 9/5/23 with EF 10-15%, mod to sever TR, LVEDd 7.11, LAVD - HM3 in place    -   - Obtain CXR to rule out alternate pathology  - No sick contacts or additional symptoms to suggest need for viral respiratory workup  -C/w home lasix 80 mg daily after replacing potassium  -On Primacor 0.25 at home but ran out 1  week back   -Obtain TTE  - Since has been off primacor without being volume overloaded or significant respiratory symptoms -will wait for TTE to assess for need to order primacor again as he has been non compliant as outpatient with labs and visits  -palliative care to be considered if not going on primacor      Bilateral back pain  Mots likely related to sleeping on the couch  NO red flag symptoms on admission  Xray C/T and L spine  C/w home gabapentin      Supratherapeutic INR  4.7 on admission  Hold coumadin dose today - pharmacy  to manage coumadin dosing  Check INR daily        Hypokalemia  2.7 on admission  Replace and monitor  Repeat with BMP 2000    Type 2 diabetes mellitus with hyperglycemia, with long-term current use of insulin  A1C 10.3  Endocrinology consulted for glucose management       Seizure-like activity  C/w home keppra 1.5 gm BID       Current Smoking    - Nicotine patch   - Will provide counseling     Enrique Vazquez MD  Heart Transplant  [facility] - Cardiology Stepdown

## 2024-08-25 NOTE — NURSING
[facility], [facility] – [place]  Nurses Note -- 4 Eyes      8/25/2024       Skin assessed on: Admit      [x] No Pressure Injuries Present    [x]Prevention Measures Documented    [] Yes LDA  for Pressure Injury Previously documented     [] Yes New Pressure Injury Discovered   [] LDA for New Pressure Injury Added      Attending RN:  Leni Faria RN     Second RN:  Julianne MODI

## 2024-08-25 NOTE — PLAN OF CARE
Problem: Adult Inpatient Plan of Care  Goal: Plan of Care Review  Outcome: Progressing  Flowsheets (Taken 8/25/2024 1257)  Plan of Care Reviewed With: patient  Goal: Patient-Specific Goal (Individualized)  Outcome: Progressing  Goal: Absence of Hospital-Acquired Illness or Injury  Outcome: Progressing  Intervention: Identify and Manage Fall Risk  Flowsheets (Taken 8/25/2024 1257)  Safety Promotion/Fall Prevention: assistive device/personal item within reach  Intervention: Prevent Skin Injury  Flowsheets (Taken 8/25/2024 1257)  Body Position: position changed independently  Intervention: Prevent and Manage VTE (Venous Thromboembolism) Risk  Flowsheets (Taken 8/25/2024 1257)  VTE Prevention/Management: ROM (active) performed  Intervention: Prevent Infection  Flowsheets (Taken 8/25/2024 1257)  Infection Prevention: cohorting utilized  Goal: Optimal Comfort and Wellbeing  Outcome: Progressing  Intervention: Monitor Pain and Promote Comfort  Flowsheets (Taken 8/25/2024 1257)  Pain Management Interventions: quiet environment facilitated  Intervention: Provide Person-Centered Care  Flowsheets (Taken 8/25/2024 1257)  Trust Relationship/Rapport:   care explained   questions encouraged   choices provided  Goal: Readiness for Transition of Care  Outcome: Progressing  Intervention: Mutually Develop Transition Plan  Flowsheets (Taken 8/25/2024 1257)  Transportation Anticipated: health plan transportation     Problem: Diabetes Comorbidity  Goal: Blood Glucose Level Within Targeted Range  Outcome: Progressing  Intervention: Monitor and Manage Glycemia  Flowsheets (Taken 8/25/2024 1257)  Glycemic Management: blood glucose monitored     Problem: Infection  Goal: Absence of Infection Signs and Symptoms  Outcome: Progressing  Intervention: Prevent or Manage Infection  Flowsheets (Taken 8/25/2024 1257)  Infection Management: aseptic technique maintained  Isolation Precautions: protective     Problem: Ventricular Assist Device  Goal:  Optimal Adjustment to Device  Outcome: Progressing  Intervention: Optimize Psychosocial Response to VAD  Flowsheets (Taken 8/25/2024 1257)  Supportive Measures: active listening utilized  Goal: Absence of Bleeding  Outcome: Progressing  Intervention: Monitor and Manage Bleeding  Flowsheets (Taken 8/25/2024 1257)  Bleeding Precautions: monitored for signs of bleeding  Goal: Absence of Embolism Signs and Symptoms  Outcome: Progressing  Intervention: Prevent or Manage Embolism  Flowsheets (Taken 8/25/2024 1257)  VTE Prevention/Management: ROM (active) performed  Goal: Optimal Blood Flow  Outcome: Progressing  Goal: Absence of Infection Signs and Symptoms  Outcome: Progressing  Intervention: Prevent or Manage Infection  Flowsheets (Taken 8/25/2024 1257)  Infection Prevention: cohorting utilized  Goal: Effective Right-Sided Heart Function  Outcome: Progressing  Intervention: Manage Decreased Right Heart Function  Flowsheets (Taken 8/25/2024 1257)  Medication Review/Management: medications reviewed     Problem: Ventricular Assist Device  Goal: Optimal Adjustment to Device  Outcome: Progressing  Intervention: Optimize Psychosocial Response to VAD  Flowsheets (Taken 8/25/2024 1257)  Supportive Measures: active listening utilized  Goal: Absence of Bleeding  Outcome: Progressing  Intervention: Monitor and Manage Bleeding  Flowsheets (Taken 8/25/2024 1257)  Bleeding Precautions: monitored for signs of bleeding  Goal: Absence of Embolism Signs and Symptoms  Outcome: Progressing  Intervention: Prevent or Manage Embolism  Flowsheets (Taken 8/25/2024 1257)  VTE Prevention/Management: ROM (active) performed  Goal: Optimal Blood Flow  Outcome: Progressing  Goal: Absence of Infection Signs and Symptoms  Outcome: Progressing  Intervention: Prevent or Manage Infection  Flowsheets (Taken 8/25/2024 1257)  Infection Prevention: cohorting utilized  Goal: Effective Right-Sided Heart Function  Outcome: Progressing  Intervention: Manage  Decreased Right Heart Function  Flowsheets (Taken 8/25/2024 1257)  Medication Review/Management: medications reviewed

## 2024-08-25 NOTE — SUBJECTIVE & OBJECTIVE
Past Medical History:   Diagnosis Date    Acute respiratory failure with hypoxia 07/22/2023    Arthritis     Awareness alteration, transient 09/01/2022    Cardiomyopathy     CHF (congestive heart failure) 10/01/2020    COVID-19 06/03/2023    Diabetes mellitus     Dilated cardiomyopathy 10/26/2020    Drug abuse 10/2020    Headache 04/19/2023    Hyperglycemia 12/16/2022    Hyperosmolar hyperglycemic state (HHS) 05/25/2022    ICD (implantable cardioverter-defibrillator) in place 10/26/2020    Infected defibrillator now s/p removal Nov 2023 07/23/2023    Left ventricular assist device (LVAD) complication, initial encounter 06/05/2023    Muscle cramping 06/15/2022    Renal disorder     SOB (shortness of breath) 06/13/2022    Tingling in extremities 07/13/2022       Past Surgical History:   Procedure Laterality Date    APPLICATION OF WOUND VACUUM-ASSISTED CLOSURE DEVICE N/A 6/30/2022    Procedure: APPLICATION, WOUND VAC;  Surgeon: Luis F Paige MD;  Location: [facility] OR [address] [place] [address];  Service: Cardiovascular;  Laterality: N/A;  50 x 5 cm    CARDIAC DEFIBRILLATOR PLACEMENT      ECHOCARDIOGRAM,TRANSESOPHAGEAL  11/9/2023    Procedure: Transesophageal echo (GUILLERMO) intra-procedure log documentation;  Surgeon: Eron Ivy MD;  Location: [facility] EP LAB;  Service: Cardiology;;    EXTRACTION, ELECTRODE LEAD Left 11/9/2023    Procedure: EXTRACTION, ELECTRODE LEAD;  Surgeon: ADALID Leon MD;  Location: [facility] EP LAB;  Service: Cardiology;  Laterality: Left;  INFECTION, LEAD EXTRACTION, GUILLERMO, BSCI, ANES, EH,   *NO CTS BACKUP*    IMPLANTATION OF RIGHT VENTRICULAR ASSIST DEVICE (RVAD) N/A 6/29/2022    Procedure: INSERTION, RVAD;  Surgeon: Luis F Paige MD;  Location: [facility] OR [address] [place] [address];  Service: Cardiovascular;  Laterality: N/A;    INSERTION OF GRAFT TO PERICARDIUM Right 6/30/2022    Procedure: INSERTION-RIGHT VENTRICULAR ASSIST DEVICE;  Surgeon: Luis F Paige MD;  Location: [facility] OR [address] [place] [address];  Service: Cardiovascular;  Laterality:  Right;    IRRIGATION OF MEDIASTINUM  6/30/2022    Procedure: IRRIGATION, MEDIASTINUM;  Surgeon: Luis F Paige MD;  [facility];  Service: Cardiovascular;;    LEFT VENTRICULAR ASSIST DEVICE Left 6/23/2022    Procedure: INSERTION-LEFT VENTRICULAR ASSIST DEVICE;  Surgeon: Luis F Paige MD;  [facility];  Service: Cardiovascular;  Laterality: Left;    LEFT VENTRICULAR ASSIST DEVICE N/A 6/29/2022    Procedure: INSERTION-LEFT VENTRICULAR ASSIST DEVICE;  Surgeon: Luis F Paige MD;  [facility];  Service: Cardiovascular;  Laterality: N/A;    REVISION OF SKIN POCKET FOR CARDIOVERTER-DEFIBRILLATOR  11/9/2023    Procedure: REVISION, SKIN POCKET, FOR CARDIOVERTER-DEFIBRILLATOR;  Surgeon: ADALID Leon MD;  Location: [facility] EP LAB;  Service: Cardiology;;    RIGHT HEART CATHETERIZATION Right 4/8/2022    Procedure: INSERTION, CATHETER, RIGHT HEART;  Surgeon: Luca Lopez Jr., MD;  Location: [facility] CATH LAB;  Service: Cardiology;  Laterality: Right;    RIGHT HEART CATHETERIZATION Right 4/19/2022    Procedure: INSERTION, CATHETER, RIGHT HEART;  Surgeon: Josh Pulido MD;  Location: [facility] CATH LAB;  Service: Cardiology;  Laterality: Right;    RIGHT HEART CATHETERIZATION Right 7/21/2022    Procedure: INSERTION, CATHETER, RIGHT HEART;  Surgeon: Dalia Crum MD;  Location: [facility] CATH LAB;  Service: Cardiology;  Laterality: Right;    RIGHT HEART CATHETERIZATION Right 10/31/2022    Procedure: INSERTION, CATHETER, RIGHT HEART;  Surgeon: Dalia Crum MD;  Location: [facility] CATH LAB;  Service: Cardiology;  Laterality: Right;    STERNAL WOUND CLOSURE N/A 6/30/2022    Procedure: CLOSURE, WOUND, STERNUM;  Surgeon: Luis F Paige MD;  [facility];  Service: Cardiovascular;  Laterality: N/A;       Review of patient's allergies indicates:   Allergen Reactions    Bumex [bumetanide] Hives    Torsemide Hives       Current Facility-Administered Medications   Medication     amLODIPine tablet 5 mg    aspirin EC tablet 81 mg    atorvastatin tablet 40 mg    cyclobenzaprine tablet 5 mg    dextrose 10% bolus 125 mL 125 mL    dextrose 10% bolus 250 mL 250 mL    DULoxetine DR capsule 30 mg    furosemide tablet 80 mg    gabapentin capsule 100 mg    gabapentin capsule 400 mg    glucagon (human recombinant) injection 1 mg    glucose chewable tablet 16 g    glucose chewable tablet 24 g    insulin aspart U-100 pen 0-10 Units    insulin aspart U-100 pen 14 Units    insulin glargine U-100 (Lantus) pen 16 Units    levETIRAcetam tablet 1,500 mg    magnesium oxide tablet 400 mg    nicotine 14 mg/24 hr 1 patch    ondansetron disintegrating tablet 4 mg    pantoprazole EC tablet 40 mg    potassium chloride SA CR tablet 40 mEq    potassium, sodium phosphates 280-160-250 mg packet 1 packet    spironolactone tablet 25 mg     Family History       Problem Relation (Age of Onset)    Diverticulosis Brother    Heart attack Maternal Grandmother, Maternal Grandfather    Heart failure Father          Tobacco Use    Smoking status: Former     Current packs/day: 0.00     Average packs/day: 0.5 packs/day for 16.6 years (8.3 ttl pk-yrs)     Types: Cigarettes     Start date: 10/1/2004     Quit date: 4/23/2021     Years since quitting: 3.3    Smokeless tobacco: Never   Substance and Sexual Activity    Alcohol use: Not Currently    Drug use: Not Currently     Types: Marijuana, MDMA (Ecstacy)    Sexual activity: Yes     Partners: Female     Birth control/protection: None     Review of Systems   Constitutional:  Negative for activity change, appetite change and chills.   HENT:  Negative for congestion, ear discharge and facial swelling.    Eyes:  Negative for discharge and itching.   Respiratory:  Positive for shortness of breath. Negative for apnea, cough, choking and chest tightness.    Cardiovascular:  Negative for chest pain, palpitations and leg swelling.   Gastrointestinal:  Negative for abdominal distention, abdominal  pain, constipation and diarrhea.   Genitourinary:  Negative for difficulty urinating and dysuria.   Musculoskeletal:  Positive for back pain. Negative for arthralgias.   Neurological:  Negative for dizziness.   Psychiatric/Behavioral:  Negative for agitation, behavioral problems and confusion.      Objective:     Vital Signs (Most Recent):  Temp: 97.6 °F (36.4 °C) (08/25/24 1255)  Pulse: 100 (08/25/24 1255)  Resp: 18 (08/25/24 1255)  BP: (!) 88/0 (08/25/24 1331)  SpO2: 98 % (08/25/24 1255) Vital Signs (24h Range):  Temp:  [97.6 °F (36.4 °C)-98 °F (36.7 °C)] 97.6 °F (36.4 °C)  Pulse:  [] 100  Resp:  [12-19] 18  SpO2:  [96 %-100 %] 98 %  BP: ()/(0-90) 88/0     Patient Vitals for the past 72 hrs (Last 3 readings):   Weight   08/25/24 1255 69.4 kg (153 lb)   08/25/24 1123 69.4 kg (153 lb)     Body mass index is 19.12 kg/m².      Intake/Output Summary (Last 24 hours) at 8/25/2024 1414  Last data filed at 8/25/2024 1334  Gross per 24 hour   Intake --   Output 600 ml   Net -600 ml          Physical Exam  Constitutional:       Appearance: Normal appearance. He is normal weight.   HENT:      Head: Normocephalic and atraumatic.      Right Ear: External ear normal.      Left Ear: External ear normal.      Mouth/Throat:      Pharynx: Oropharynx is clear.   Eyes:      Conjunctiva/sclera: Conjunctivae normal.   Neck:      Comments: Tenderness to palpation on upper back and scapula bilaterally.   Cardiovascular:      Rate and Rhythm: Normal rate and regular rhythm.      Comments: Smooth VAD hum  Pulmonary:      Effort: Pulmonary effort is normal. No respiratory distress.      Breath sounds: Normal breath sounds.   Abdominal:      General: There is no distension.      Palpations: Abdomen is soft.      Comments: Dressing in place over drive line exit site, clean and dry   Musculoskeletal:         General: No swelling.      Cervical back: Normal range of motion. No rigidity.      Right lower leg: No edema.      Left lower  leg: No edema.   Neurological:      General: No focal deficit present.      Mental Status: He is alert and oriented to person, place, and time.   Psychiatric:         Mood and Affect: Mood normal.         Behavior: Behavior normal.            Significant Labs:  CBC:  Recent Labs   Lab 08/25/24  0215 08/25/24  1150   WBC 9.87  9.87 9.96   RBC 4.55* 4.41*   HGB 8.7* 8.5*   HCT 29.6* 29.6*    215   MCV 65.1* 67*   MCH 19.1* 19.3*   MCHC 29.4* 28.7*     BNP:  Recent Labs   Lab 08/25/24  1150   *     CMP:  Recent Labs   Lab 08/25/24  0215 08/25/24  1150   GLU  --  295*   CALCIUM 9.0 8.8   ALBUMIN 2.6* 2.4*   PROT  --  7.9   * 133*   K 3.3* 2.7*   CO2 27 31*   CL 87* 92*   BUN 12.7 11   CREATININE 1.33* 1.0   ALKPHOS 208* 207*   ALT 17 14   AST 29 24   BILITOT 2.6* 2.3*      Coagulation:   Recent Labs   Lab 08/25/24  1150   INR 4.7*   APTT 49.5*     LDH:  Recent Labs   Lab 08/25/24  1150   *     Microbiology:  Microbiology Results (last 7 days)       Procedure Component Value Units Date/Time    Culture, Anaerobe [[id number]]     Order Status: No result Specimen: Wound from Abdomen     Aerobic culture [[id number]]     Order Status: No result Specimen: Wound from Abdomen             I have reviewed all pertinent labs within the past 24 hours.    Diagnostic Results:  I have reviewed and interpreted all pertinent imaging results/findings within the past 24 hours.

## 2024-08-25 NOTE — CONSULTS
[facility] - Cardiology Stepdown  Endocrinology  Diabetes Consult Note    Consult Requested by: Josh Ryan, *   Reason for admit: LVAD (left ventricular assist device) present    HISTORY OF PRESENT ILLNESS:  Reason for Consult: Management of T2DM, Hyperglycemia     Surgical Procedure and Date: LVAD 06/29/2022     Diabetes diagnosis year: 2022    Home Diabetes Medications:  Levemir 20 units twice daily and Novolog SSI (150/25) per patient    How often checking glucose at home?  Has not checked in a few weeks due to running out of strips    BG readings on regimen: COLLIN  Hypoglycemia on the regimen?  Yes; rarely experiences hypoglycemic symptoms such as blurry vision and vomiting. However, does not know his blood sugar level due to running out of supplies. He will self-correct with a snack.  Missed doses on regimen?  Yes, has not had insulin in a few weeks due to running out of glucometer supplies.    Diabetes Complications include:   Hyperglycemia    Complicating diabetes co morbidities:   History of MI, CHF, and CKD      HPI:   Patient is a 39 y.o. 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 He underwent RVAD removal and chest closure 6/30/2022.  He was weaned off  but restarted due to RVF. He was transitioned to milrinone (secondary to  shortage). History of unclear syncope vs seizures and followed by neuro and is on Keppra. On 11/15/23 underwent removal of eroded [employer] scICD--no Lifevest (due to LVAD) and no plan to replace ICD as not requiring therapy post LVAD with concern for reinfection. Reported back pain (primarily upper back pain) for the past 3-4 days. He reported that it started 2 days after stopping pirmacor and he felt it may be related. Reported some shortness of breath after walking long  distances which has been ongoing for months now and has not changed lately. He has lost some weight over last few months since his but weight has been stable over the last month. Patient admitted after being out of [facility] for 1 week with ongoing back pain. Patient stated he has not taken his insulin in a few weeks due to running out of glucometer testing supplies. He previously had a William, but is not wearing one. BG >700 at [facility]. Endo consulted for BG management.      Interval HPI:   No acute events overnight. Patient in room 319/319 A. Blood glucose improving. BG above goal on current insulin regimen (SSI ). Steroid use- None.    Renal function- Normal   Lab Results   Component Value Date    CREATININE 1.0 08/25/2024     Vasopressors-  None     Endocrine will continue to follow and manage insulin orders inpatient.     Diet diabetic Cardiac (Low Na/Chol); 2000 Calorie; Fluid - 1500mL; Standard Tray     Eating:    diet advanced today  Nausea: No  Hypoglycemia and intervention: No  Fever: No  TPN and/or TF: No  If yes, type of TF/TPN and rate: N/A    PMH, PSH, FH, SH updated and reviewed     ROS:  Review of Systems   Constitutional:  Positive for unexpected weight change. Negative for fatigue.   Eyes:  Negative for visual disturbance.   Respiratory:  Positive for shortness of breath.    Cardiovascular:  Negative for chest pain.   Gastrointestinal:  Negative for abdominal pain, nausea and vomiting.   Endocrine: Negative for polydipsia, polyphagia and polyuria.   Musculoskeletal:  Positive for back pain.   Neurological:  Negative for dizziness, weakness, numbness and headaches.       Current Medications and/or Treatments Impacting Glycemic Control  Immunotherapy:    Immunosuppressants       None          Steroids:   Hormones (From admission, onward)      None          Pressors:    Autonomic Drugs (From admission, onward)      None          Hyperglycemia/Diabetes Medications:   Antihyperglycemics (From  "admission, onward)      Start     Stop Route Frequency Ordered    08/25/24 2100  insulin glargine U-100 (Lantus) pen 16 Units         -- SubQ 2 times daily 08/25/24 1407    08/25/24 1645  insulin aspart U-100 pen 14 Units         -- SubQ 3 times daily with meals 08/25/24 1407    08/25/24 1506  insulin aspart U-100 pen 0-10 Units         -- SubQ Before meals & nightly PRN 08/25/24 1407             PHYSICAL EXAMINATION:  Vitals:    08/25/24 1331   BP: (!) 88/0   Pulse:    Resp:    Temp:      Body mass index is 19.12 kg/m².     Physical Exam  Constitutional:       General: He is not in acute distress.     Appearance: Normal appearance.   HENT:      Head: Normocephalic and atraumatic.      Nose: Nose normal.   Eyes:      Conjunctiva/sclera: Conjunctivae normal.   Cardiovascular:      Rate and Rhythm: Normal rate.   Pulmonary:      Effort: Pulmonary effort is normal. No respiratory distress.   Abdominal:      General: There is no distension.   Musculoskeletal:      Right lower leg: No edema.      Left lower leg: No edema.   Neurological:      Mental Status: He is alert.   Psychiatric:         Mood and Affect: Mood normal.         Behavior: Behavior normal.         Thought Content: Thought content normal.         Judgment: Judgment normal.            Labs Reviewed and Include   Recent Labs   Lab 08/25/24  1150   *   CALCIUM 8.8   ALBUMIN 2.4*   PROT 7.9   *   K 2.7*   CO2 31*   CL 92*   BUN 11   CREATININE 1.0   ALKPHOS 207*   ALT 14   AST 24   BILITOT 2.3*     Lab Results   Component Value Date    WBC 9.96 08/25/2024    HGB 8.5 (L) 08/25/2024    HCT 29.6 (L) 08/25/2024    MCV 67 (L) 08/25/2024     08/25/2024     No results for input(s): "TSH", "FREET4" in the last 168 hours.  Lab Results   Component Value Date    HGBA1C 10.3 (H) 08/25/2024       Nutritional status:   Body mass index is 19.12 kg/m².  Lab Results   Component Value Date    ALBUMIN 2.4 (L) 08/25/2024    ALBUMIN 2.6 (L) 08/25/2024    " ALBUMIN 2.8 (L) 05/26/2024     Lab Results   Component Value Date    PREALBUMIN 8 (L) 05/20/2024    PREALBUMIN 12 (L) 04/26/2024    PREALBUMIN 13 (L) 04/24/2024       Estimated Creatinine Clearance: 97.4 mL/min (based on SCr of 1 mg/dL).    Accu-Checks  Recent Labs     08/25/24  0400 08/25/24  0443 08/25/24  0528 08/25/24  0547 08/25/24  0629 08/25/24  0641 08/25/24  0736   POCTGLUCOSE >500* >500* >500* >500* 500* >500* 464*        ASSESSMENT and PLAN    Cardiac/Vascular  * LVAD (left ventricular assist device) present  Managed by primary team  Optimize blood glucose control        CHF (NYHA class IV, ACC/AHA stage D)  Managed by primary team  Optimize blood glucose control      Endocrine  Type 2 diabetes mellitus with hyperglycemia, with long-term current use of insulin  Endocrinology consulted for BG management.   BG goal 140-180  Noncompliant T2DM. BG >700 at [facility] and received a total of 27 units of insulin regular.    - Start Lantus (Insulin Glargine) 16 units BID (20% decrease from home regimen)  - Start Novolog (Insulin Aspart) 14 units TIDWM (20% decrease from home regimen)  - Start moderate dose correction (150/25)  - BG checks AC/HS  - Hypoglycemia protocol in place    ** Please notify Endocrine for any change and/or advance in diet**  ** Please call Endocrine for any BG related issues **    Discharge Planning:   TBD. Please notify endocrinology prior to discharge.  Will likely resume home regimen.  Patient requires glucometer and testing supplies.  Recommend he resumes his William for blood sugar monitoring.      Plan discussed with patient at bedside.     Guera De Oliveira PA-C  Endocrinology  [facility] - Cardiology Stepdown

## 2024-08-25 NOTE — ED NOTES
Attempted to call report first time. Receiving nurse told me to call back in 5 minutes. Will call back in 5 minutes

## 2024-08-25 NOTE — ASSESSMENT & PLAN NOTE
4.7 on admission  Hold coumadin dose today - pharmacy  to manage coumadin dosing  Check INR daily

## 2024-08-25 NOTE — ASSESSMENT & PLAN NOTE
-HeartMate 3 Implanted  6/29/2022  as DT  -HTS Primary  -HOLD Coumadin, Goal INR 2.0-2.5. Supratherapeutic today.   -Antiplatelets ASA 81 mg  -LDH is stable overall today. Will continue to monitor daily.  -Speed set at 5100, LSL 4700 rpm  -Interrogation notable for  power cable disconnected, low voltage advisory, PI events  -Not listed for OHTx      Procedure: Device Interrogation Including analysis of device parameters  Current Settings: Ventricular Assist Device  Review of device function is stable/unstable stable        8/25/2024    11:02 AM 5/26/2024     8:00 PM 5/26/2024     3:26 PM 5/26/2024    11:53 AM 5/26/2024     8:13 AM 5/26/2024     4:30 AM 5/25/2024    11:00 PM   TXP LVAD INTERROGATIONS   Type HeartMate3 HeartMate3 HeartMate3 HeartMate3 HeartMate3 HeartMate3 HeartMate3   Flow 3.8 4.6 4.6 4.5 4.3 4.4 4   Speed 5100 5100 5100 5100 5100 5150 5100   PI 6.2 3.8 3.8 4.1 5.1 4.4 5.6   Power (Serna) 3.5 3.6 3.6 3.6 3.6 3.6 3.6   LSL 4700 4700 4700 4700 4700     Pulsatility      Intermittent pulse Intermittent pulse

## 2024-08-25 NOTE — ASSESSMENT & PLAN NOTE
Appears Euvolemic on exam     Most recent TTE 9/5/23 with EF 10-15%, mod to sever TR, LVEDd 7.11, LAVD - HM3 in place    - , downtrending  - Obtain CXR to rule out alternate pathology  - No sick contacts or additional symptoms to suggest need for viral respiratory workup  -transition from lasix to bumex, assess if pt tolerates/see if true allergy. Pt previously on PO lasix 80 daily, however reports he was taking this twice a day at home. Will initiate bumex 2mg BID and f/up output and tolerance   -GDMT: c/w spironolactone  -Previously on home Primacor 0.25 at home but ran out approx 5 days ago and also reports he occasionally disconnects himself from his pump to perform certain activities  -Obtain TTE, ordered but still pending   - Since has been off primacor, pt does have significant JVD but does not have notable volume in extremities, though abdomen is slightly distended and may be holding fluid in abdomen. Discussed goals of care and involving palliative care to clarify goals given noncompliance.    -palliative care c/s and pt discussed with service, appreciate recommendations

## 2024-08-26 PROBLEM — E44.1 MILD PROTEIN-CALORIE MALNUTRITION: Status: RESOLVED | Noted: 2023-09-05 | Resolved: 2024-08-26

## 2024-08-26 PROBLEM — F19.10 POLYSUBSTANCE ABUSE: Status: ACTIVE | Noted: 2024-08-26

## 2024-08-26 PROBLEM — E44.1 MILD PROTEIN-CALORIE MALNUTRITION: Status: ACTIVE | Noted: 2023-09-05

## 2024-08-26 PROBLEM — N63.0 SUBCUTANEOUS NODULE OF BREAST: Status: ACTIVE | Noted: 2024-08-26

## 2024-08-26 LAB
ALBUMIN SERPL BCP-MCNC: 2.2 G/DL (ref 3.5–5.2)
ALLENS TEST: ABNORMAL
ALP SERPL-CCNC: 199 U/L (ref 55–135)
ALT SERPL W/O P-5'-P-CCNC: 13 U/L (ref 10–44)
AMPHET+METHAMPHET UR QL: NEGATIVE
ANION GAP SERPL CALC-SCNC: 13 MMOL/L (ref 8–16)
ANION GAP SERPL CALC-SCNC: 7 MMOL/L (ref 8–16)
APTT PPP: 83.1 SEC (ref 21–32)
ASCENDING AORTA: 2.94 CM
AST SERPL-CCNC: 23 U/L (ref 10–40)
BARBITURATES UR QL SCN>200 NG/ML: NEGATIVE
BASOPHILS # BLD AUTO: 0.04 K/UL (ref 0–0.2)
BASOPHILS NFR BLD: 0.4 % (ref 0–1.9)
BENZODIAZ UR QL SCN>200 NG/ML: NEGATIVE
BILIRUB DIRECT SERPL-MCNC: 1.7 MG/DL (ref 0.1–0.3)
BILIRUB SERPL-MCNC: 2.2 MG/DL (ref 0.1–1)
BNP SERPL-MCNC: 461 PG/ML (ref 0–99)
BSA FOR ECHO PROCEDURE: 1.94 M2
BUN SERPL-MCNC: 13 MG/DL (ref 6–20)
BUN SERPL-MCNC: 13 MG/DL (ref 6–20)
BZE UR QL SCN: NEGATIVE
CALCIUM SERPL-MCNC: 8.8 MG/DL (ref 8.7–10.5)
CALCIUM SERPL-MCNC: 8.9 MG/DL (ref 8.7–10.5)
CANNABINOIDS UR QL SCN: NEGATIVE
CHLORIDE SERPL-SCNC: 94 MMOL/L (ref 95–110)
CHLORIDE SERPL-SCNC: 94 MMOL/L (ref 95–110)
CO2 SERPL-SCNC: 26 MMOL/L (ref 23–29)
CO2 SERPL-SCNC: 29 MMOL/L (ref 23–29)
CREAT SERPL-MCNC: 1 MG/DL (ref 0.5–1.4)
CREAT SERPL-MCNC: 1 MG/DL (ref 0.5–1.4)
CREAT UR-MCNC: 28 MG/DL (ref 23–375)
CRP SERPL-MCNC: 50.4 MG/L (ref 0–8.2)
CV ECHO LV RWT: 0.19 CM
DELSYS: ABNORMAL
DIFFERENTIAL METHOD BLD: ABNORMAL
DOP CALC LVOT AREA: 4.5 CM2
DOP CALC LVOT DIAMETER: 2.39 CM
E WAVE DECELERATION TIME: 79.09 MSEC
E/A RATIO: 1.66
E/E' RATIO: 7 M/S
ECHO LV POSTERIOR WALL: 0.7 CM (ref 0.6–1.1)
EOSINOPHIL # BLD AUTO: 0.1 K/UL (ref 0–0.5)
EOSINOPHIL NFR BLD: 1 % (ref 0–8)
ERYTHROCYTE [DISTWIDTH] IN BLOOD BY AUTOMATED COUNT: 28 % (ref 11.5–14.5)
EST. GFR  (NO RACE VARIABLE): >60 ML/MIN/1.73 M^2
EST. GFR  (NO RACE VARIABLE): >60 ML/MIN/1.73 M^2
ETHANOL UR-MCNC: <10 MG/DL
FIO2: 21
FRACTIONAL SHORTENING: 3 % (ref 28–44)
GLUCOSE SERPL-MCNC: 122 MG/DL (ref 70–110)
GLUCOSE SERPL-MCNC: 374 MG/DL (ref 70–110)
HCT VFR BLD AUTO: 29.1 % (ref 40–54)
HGB BLD-MCNC: 8.2 G/DL (ref 14–18)
IMM GRANULOCYTES # BLD AUTO: 0.04 K/UL (ref 0–0.04)
IMM GRANULOCYTES NFR BLD AUTO: 0.4 % (ref 0–0.5)
INR PPP: 4.3 (ref 0.8–1.2)
INTERVENTRICULAR SEPTUM: 0.66 CM (ref 0.6–1.1)
LA MAJOR: 6.32 CM
LA WIDTH: 5.16 CM
LDH SERPL L TO P-CCNC: 274 U/L (ref 110–260)
LEFT ATRIUM SIZE: 2.63 CM
LEFT INTERNAL DIMENSION IN SYSTOLE: 7 CM (ref 2.1–4)
LEFT VENTRICLE DIASTOLIC VOLUME INDEX: 99.53 ML/M2
LEFT VENTRICLE DIASTOLIC VOLUME: 196.08 ML
LEFT VENTRICLE MASS INDEX: 108 G/M2
LEFT VENTRICLE SYSTOLIC VOLUME INDEX: 96 ML/M2
LEFT VENTRICLE SYSTOLIC VOLUME: 189.17 ML
LEFT VENTRICULAR INTERNAL DIMENSION IN DIASTOLE: 7.2 CM (ref 3.5–6)
LEFT VENTRICULAR MASS: 211.91 G
LV LATERAL E/E' RATIO: 6.3 M/S
LV SEPTAL E/E' RATIO: 7.88 M/S
LVAD BASE RATE: 5100 RPM
LYMPHOCYTES # BLD AUTO: 1.6 K/UL (ref 1–4.8)
LYMPHOCYTES NFR BLD: 15.2 % (ref 18–48)
MAGNESIUM SERPL-MCNC: 1.6 MG/DL (ref 1.6–2.6)
MAGNESIUM SERPL-MCNC: 1.6 MG/DL (ref 1.6–2.6)
MCH RBC QN AUTO: 18.7 PG (ref 27–31)
MCHC RBC AUTO-ENTMCNC: 28.2 G/DL (ref 32–36)
MCV RBC AUTO: 66 FL (ref 82–98)
METHADONE UR QL SCN>300 NG/ML: NEGATIVE
MODE: ABNORMAL
MONOCYTES # BLD AUTO: 0.7 K/UL (ref 0.3–1)
MONOCYTES NFR BLD: 6.8 % (ref 4–15)
MV PEAK A VEL: 0.38 M/S
MV PEAK E VEL: 0.63 M/S
MV STENOSIS PRESSURE HALF TIME: 22.94 MS
MV VALVE AREA P 1/2 METHOD: 9.59 CM2
NEUTROPHILS # BLD AUTO: 8 K/UL (ref 1.8–7.7)
NEUTROPHILS NFR BLD: 76.2 % (ref 38–73)
NRBC BLD-RTO: 0 /100 WBC
OPIATES UR QL SCN: NEGATIVE
PCP UR QL SCN>25 NG/ML: NEGATIVE
PHOSPHATE SERPL-MCNC: 2.2 MG/DL (ref 2.7–4.5)
PHOSPHATE SERPL-MCNC: 2.6 MG/DL (ref 2.7–4.5)
PISA TR MAX VEL: 2.13 M/S
PLATELET # BLD AUTO: 235 K/UL (ref 150–450)
PMV BLD AUTO: ABNORMAL FL (ref 9.2–12.9)
PO2 BLDA: 25 MMHG (ref 40–60)
POC SATURATED O2: 45 % (ref 95–100)
POCT GLUCOSE: 182 MG/DL (ref 70–110)
POCT GLUCOSE: 204 MG/DL (ref 70–110)
POCT GLUCOSE: 357 MG/DL (ref 70–110)
POCT GLUCOSE: 431 MG/DL (ref 70–110)
POTASSIUM SERPL-SCNC: 3.3 MMOL/L (ref 3.5–5.1)
POTASSIUM SERPL-SCNC: 4 MMOL/L (ref 3.5–5.1)
PREALB SERPL-MCNC: 7 MG/DL (ref 20–43)
PROT SERPL-MCNC: 7.4 G/DL (ref 6–8.4)
PROTHROMBIN TIME: 43.1 SEC (ref 9–12.5)
RA MAJOR: 6.02 CM
RA PRESSURE ESTIMATED: 15 MMHG
RA WIDTH: 5.44 CM
RBC # BLD AUTO: 4.38 M/UL (ref 4.6–6.2)
RIGHT VENTRICLE DIASTOLIC BASEL DIMENSION: 6.3 CM
RV TB RVSP: 17 MMHG
SAMPLE: ABNORMAL
SINUS: 3.32 CM
SITE: ABNORMAL
SODIUM SERPL-SCNC: 130 MMOL/L (ref 136–145)
SODIUM SERPL-SCNC: 133 MMOL/L (ref 136–145)
STJ: 2.55 CM
TDI LATERAL: 0.1 M/S
TDI SEPTAL: 0.08 M/S
TDI: 0.09 M/S
TOXICOLOGY INFORMATION: NORMAL
TR MAX PG: 18 MMHG
TRICUSPID ANNULAR PLANE SYSTOLIC EXCURSION: 0.86 CM
TROPONIN I SERPL DL<=0.01 NG/ML-MCNC: 0.01 NG/ML (ref 0–0.03)
TSH SERPL DL<=0.005 MIU/L-ACNC: 2.01 UIU/ML (ref 0.4–4)
TV REST PULMONARY ARTERY PRESSURE: 33 MMHG
WBC # BLD AUTO: 10.44 K/UL (ref 3.9–12.7)
Z-SCORE OF LEFT VENTRICULAR DIMENSION IN END DIASTOLE: 2.4
Z-SCORE OF LEFT VENTRICULAR DIMENSION IN END SYSTOLE: 5.52

## 2024-08-26 PROCEDURE — 94761 N-INVAS EAR/PLS OXIMETRY MLT: CPT | Mod: XB

## 2024-08-26 PROCEDURE — 84100 ASSAY OF PHOSPHORUS: CPT | Performed by: STUDENT IN AN ORGANIZED HEALTH CARE EDUCATION/TRAINING PROGRAM

## 2024-08-26 PROCEDURE — 85730 THROMBOPLASTIN TIME PARTIAL: CPT | Performed by: STUDENT IN AN ORGANIZED HEALTH CARE EDUCATION/TRAINING PROGRAM

## 2024-08-26 PROCEDURE — 84484 ASSAY OF TROPONIN QUANT: CPT | Performed by: INTERNAL MEDICINE

## 2024-08-26 PROCEDURE — 27000248 HC VAD-ADDITIONAL DAY

## 2024-08-26 PROCEDURE — 84443 ASSAY THYROID STIM HORMONE: CPT

## 2024-08-26 PROCEDURE — 63600175 PHARM REV CODE 636 W HCPCS

## 2024-08-26 PROCEDURE — 80048 BASIC METABOLIC PNL TOTAL CA: CPT | Mod: 91 | Performed by: STUDENT IN AN ORGANIZED HEALTH CARE EDUCATION/TRAINING PROGRAM

## 2024-08-26 PROCEDURE — 83615 LACTATE (LD) (LDH) ENZYME: CPT | Performed by: STUDENT IN AN ORGANIZED HEALTH CARE EDUCATION/TRAINING PROGRAM

## 2024-08-26 PROCEDURE — 83735 ASSAY OF MAGNESIUM: CPT | Performed by: STUDENT IN AN ORGANIZED HEALTH CARE EDUCATION/TRAINING PROGRAM

## 2024-08-26 PROCEDURE — 83735 ASSAY OF MAGNESIUM: CPT | Mod: 91 | Performed by: STUDENT IN AN ORGANIZED HEALTH CARE EDUCATION/TRAINING PROGRAM

## 2024-08-26 PROCEDURE — 85610 PROTHROMBIN TIME: CPT | Performed by: STUDENT IN AN ORGANIZED HEALTH CARE EDUCATION/TRAINING PROGRAM

## 2024-08-26 PROCEDURE — 93010 ELECTROCARDIOGRAM REPORT: CPT | Mod: ,,, | Performed by: INTERNAL MEDICINE

## 2024-08-26 PROCEDURE — 85025 COMPLETE CBC W/AUTO DIFF WBC: CPT | Performed by: STUDENT IN AN ORGANIZED HEALTH CARE EDUCATION/TRAINING PROGRAM

## 2024-08-26 PROCEDURE — 25000003 PHARM REV CODE 250: Performed by: STUDENT IN AN ORGANIZED HEALTH CARE EDUCATION/TRAINING PROGRAM

## 2024-08-26 PROCEDURE — 86140 C-REACTIVE PROTEIN: CPT | Performed by: STUDENT IN AN ORGANIZED HEALTH CARE EDUCATION/TRAINING PROGRAM

## 2024-08-26 PROCEDURE — 63600175 PHARM REV CODE 636 W HCPCS: Performed by: STUDENT IN AN ORGANIZED HEALTH CARE EDUCATION/TRAINING PROGRAM

## 2024-08-26 PROCEDURE — 99232 SBSQ HOSP IP/OBS MODERATE 35: CPT | Mod: ,,,

## 2024-08-26 PROCEDURE — 80307 DRUG TEST PRSMV CHEM ANLYZR: CPT

## 2024-08-26 PROCEDURE — 93005 ELECTROCARDIOGRAM TRACING: CPT

## 2024-08-26 PROCEDURE — 80076 HEPATIC FUNCTION PANEL: CPT | Performed by: STUDENT IN AN ORGANIZED HEALTH CARE EDUCATION/TRAINING PROGRAM

## 2024-08-26 PROCEDURE — 83880 ASSAY OF NATRIURETIC PEPTIDE: CPT | Performed by: STUDENT IN AN ORGANIZED HEALTH CARE EDUCATION/TRAINING PROGRAM

## 2024-08-26 PROCEDURE — 20600001 HC STEP DOWN PRIVATE ROOM

## 2024-08-26 PROCEDURE — 84134 ASSAY OF PREALBUMIN: CPT | Performed by: STUDENT IN AN ORGANIZED HEALTH CARE EDUCATION/TRAINING PROGRAM

## 2024-08-26 PROCEDURE — 94799 UNLISTED PULMONARY SVC/PX: CPT

## 2024-08-26 PROCEDURE — 93750 INTERROGATION VAD IN PERSON: CPT | Mod: ,,, | Performed by: INTERNAL MEDICINE

## 2024-08-26 PROCEDURE — S4991 NICOTINE PATCH NONLEGEND: HCPCS | Performed by: STUDENT IN AN ORGANIZED HEALTH CARE EDUCATION/TRAINING PROGRAM

## 2024-08-26 PROCEDURE — 82803 BLOOD GASES ANY COMBINATION: CPT

## 2024-08-26 PROCEDURE — 25000003 PHARM REV CODE 250: Performed by: INTERNAL MEDICINE

## 2024-08-26 PROCEDURE — 25000003 PHARM REV CODE 250

## 2024-08-26 PROCEDURE — 80048 BASIC METABOLIC PNL TOTAL CA: CPT | Performed by: STUDENT IN AN ORGANIZED HEALTH CARE EDUCATION/TRAINING PROGRAM

## 2024-08-26 PROCEDURE — 84100 ASSAY OF PHOSPHORUS: CPT | Mod: 91 | Performed by: STUDENT IN AN ORGANIZED HEALTH CARE EDUCATION/TRAINING PROGRAM

## 2024-08-26 PROCEDURE — 99900035 HC TECH TIME PER 15 MIN (STAT)

## 2024-08-26 PROCEDURE — 99233 SBSQ HOSP IP/OBS HIGH 50: CPT | Mod: ,,, | Performed by: INTERNAL MEDICINE

## 2024-08-26 RX ORDER — INSULIN GLARGINE 100 [IU]/ML
19 INJECTION, SOLUTION SUBCUTANEOUS 2 TIMES DAILY
Status: DISCONTINUED | OUTPATIENT
Start: 2024-08-26 | End: 2024-08-27

## 2024-08-26 RX ORDER — BACLOFEN 5 MG/1
5 TABLET ORAL ONCE
Status: COMPLETED | OUTPATIENT
Start: 2024-08-26 | End: 2024-08-26

## 2024-08-26 RX ORDER — INSULIN ASPART 100 [IU]/ML
18 INJECTION, SOLUTION INTRAVENOUS; SUBCUTANEOUS
Status: DISCONTINUED | OUTPATIENT
Start: 2024-08-26 | End: 2024-08-27

## 2024-08-26 RX ORDER — ACETAMINOPHEN 325 MG/1
650 TABLET ORAL EVERY 6 HOURS PRN
Status: DISCONTINUED | OUTPATIENT
Start: 2024-08-26 | End: 2024-09-10 | Stop reason: HOSPADM

## 2024-08-26 RX ORDER — BUMETANIDE 1 MG/1
2 TABLET ORAL 2 TIMES DAILY
Status: DISCONTINUED | OUTPATIENT
Start: 2024-08-26 | End: 2024-09-05

## 2024-08-26 RX ORDER — LIDOCAINE 50 MG/G
2 PATCH TOPICAL
Status: DISCONTINUED | OUTPATIENT
Start: 2024-08-26 | End: 2024-09-10 | Stop reason: HOSPADM

## 2024-08-26 RX ADMIN — SPIRONOLACTONE 25 MG: 25 TABLET ORAL at 08:08

## 2024-08-26 RX ADMIN — LIDOCAINE 2 PATCH: 50 PATCH TOPICAL at 05:08

## 2024-08-26 RX ADMIN — INSULIN ASPART 14 UNITS: 100 INJECTION, SOLUTION INTRAVENOUS; SUBCUTANEOUS at 08:08

## 2024-08-26 RX ADMIN — CYCLOBENZAPRINE HYDROCHLORIDE 5 MG: 5 TABLET, FILM COATED ORAL at 10:08

## 2024-08-26 RX ADMIN — GABAPENTIN 400 MG: 400 CAPSULE ORAL at 08:08

## 2024-08-26 RX ADMIN — ATORVASTATIN CALCIUM 40 MG: 20 TABLET, FILM COATED ORAL at 08:08

## 2024-08-26 RX ADMIN — CYCLOBENZAPRINE HYDROCHLORIDE 5 MG: 5 TABLET, FILM COATED ORAL at 08:08

## 2024-08-26 RX ADMIN — INSULIN GLARGINE 19 UNITS: 100 INJECTION, SOLUTION SUBCUTANEOUS at 08:08

## 2024-08-26 RX ADMIN — AMIODARONE HYDROCHLORIDE 1 MG/MIN: 1.8 INJECTION, SOLUTION INTRAVENOUS at 10:08

## 2024-08-26 RX ADMIN — Medication 400 MG: at 08:08

## 2024-08-26 RX ADMIN — INSULIN ASPART 10 UNITS: 100 INJECTION, SOLUTION INTRAVENOUS; SUBCUTANEOUS at 08:08

## 2024-08-26 RX ADMIN — DULOXETINE HYDROCHLORIDE 30 MG: 30 CAPSULE, DELAYED RELEASE ORAL at 08:08

## 2024-08-26 RX ADMIN — LEVETIRACETAM 1500 MG: 500 TABLET, FILM COATED ORAL at 08:08

## 2024-08-26 RX ADMIN — GABAPENTIN 100 MG: 100 CAPSULE ORAL at 12:08

## 2024-08-26 RX ADMIN — AMIODARONE HYDROCHLORIDE 150 MG: 1.5 INJECTION, SOLUTION INTRAVENOUS at 10:08

## 2024-08-26 RX ADMIN — NICOTINE 1 PATCH: 14 PATCH, EXTENDED RELEASE TRANSDERMAL at 08:08

## 2024-08-26 RX ADMIN — POTASSIUM & SODIUM PHOSPHATES POWDER PACK 280-160-250 MG 1 PACKET: 280-160-250 PACK at 12:08

## 2024-08-26 RX ADMIN — INSULIN ASPART 14 UNITS: 100 INJECTION, SOLUTION INTRAVENOUS; SUBCUTANEOUS at 12:08

## 2024-08-26 RX ADMIN — INSULIN ASPART 4 UNITS: 100 INJECTION, SOLUTION INTRAVENOUS; SUBCUTANEOUS at 04:08

## 2024-08-26 RX ADMIN — ASPIRIN 81 MG: 81 TABLET, COATED ORAL at 08:08

## 2024-08-26 RX ADMIN — FUROSEMIDE 80 MG: 80 TABLET ORAL at 08:08

## 2024-08-26 RX ADMIN — BACLOFEN 5 MG: 5 TABLET ORAL at 10:08

## 2024-08-26 RX ADMIN — INSULIN ASPART 18 UNITS: 100 INJECTION, SOLUTION INTRAVENOUS; SUBCUTANEOUS at 04:08

## 2024-08-26 RX ADMIN — POTASSIUM & SODIUM PHOSPHATES POWDER PACK 280-160-250 MG 1 PACKET: 280-160-250 PACK at 04:08

## 2024-08-26 RX ADMIN — ACETAMINOPHEN 650 MG: 325 TABLET ORAL at 05:08

## 2024-08-26 RX ADMIN — AMLODIPINE BESYLATE 5 MG: 5 TABLET ORAL at 08:08

## 2024-08-26 RX ADMIN — CYCLOBENZAPRINE HYDROCHLORIDE 5 MG: 5 TABLET, FILM COATED ORAL at 05:08

## 2024-08-26 RX ADMIN — PANTOPRAZOLE SODIUM 40 MG: 40 TABLET, DELAYED RELEASE ORAL at 09:08

## 2024-08-26 RX ADMIN — INSULIN ASPART 8 UNITS: 100 INJECTION, SOLUTION INTRAVENOUS; SUBCUTANEOUS at 12:08

## 2024-08-26 RX ADMIN — POTASSIUM & SODIUM PHOSPHATES POWDER PACK 280-160-250 MG 1 PACKET: 280-160-250 PACK at 06:08

## 2024-08-26 RX ADMIN — GABAPENTIN 100 MG: 100 CAPSULE ORAL at 08:08

## 2024-08-26 RX ADMIN — BUMETANIDE 2 MG: 1 TABLET ORAL at 04:08

## 2024-08-26 NOTE — PROGRESS NOTES
[facility] - [facility]iology Stepdown  Endocrinology  Progress Note    Admit Date: 8/25/2024     Reason for Consult: Management of T2DM, Hyperglycemia     Surgical Procedure and Date: LVAD 06/29/2022     Diabetes diagnosis year: 2022    Home Diabetes Medications:  Levemir 20 units twice daily and Novolog SSI (150/25) per patient    How often checking glucose at home?  Has not checked in a few weeks due to running out of strips    BG readings on regimen: COLLIN  Hypoglycemia on the regimen?  Yes; rarely experiences hypoglycemic symptoms such as blurry vision and vomiting. However, does not know his blood sugar level due to running out of supplies. He will self-correct with a snack.  Missed doses on regimen?  Yes, has not had insulin in a few weeks due to running out of glucometer supplies.    Diabetes Complications include:   Hyperglycemia    Complicating diabetes co morbidities:   History of MI, CHF, and CKD      HPI:   Patient is a 39 y.o. 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 He underwent RVAD removal and chest closure 6/30/2022.  He was weaned off  but restarted due to RVF. He was transitioned to milrinone (secondary to  shortage). History of unclear syncope vs seizures and followed by neuro and is on Keppra. On 11/15/23 underwent removal of eroded [employer] scICD--no Lifevest (due to LVAD) and no plan to replace ICD as not requiring therapy post LVAD with concern for reinfection. Reported back pain (primarily upper back pain) for the past 3-4 days. He reported that it started 2 days after stopping pirmacor and he felt it may be related. Reported some shortness of breath after walking long distances which has been ongoing for months now and has not changed lately. He has lost some weight over last few months since his  "but weight has been stable over the last month. Patient admitted after being out of [facility] for 1 week with ongoing back pain. Patient stated he has not taken his insulin in a few weeks due to running out of glucometer testing supplies. He previously had a William, but is not wearing one. BG >700 at [facility]. Endo consulted for BG management.      Interval HPI:   No acute events overnight. Patient in room 319/319 A. Blood glucose  variable . BG above goal on current insulin regimen (SSI, prandial, and basal insulin ). Steroid use- None.    Renal function- Normal   Lab Results   Component Value Date    CREATININE 1.0 2024     Vasopressors-  None     Endocrine will continue to follow and manage insulin orders inpatient.     Diet diabetic Cardiac (Low Na/Chol); 2000 Calorie; Fluid - 1500mL; Standard Tray     Eatin%  Nausea: No  Hypoglycemia and intervention: No  Fever: No  TPN and/or TF: No  If yes, type of TF/TPN and rate: N/A    BP (!) (P) 88/0 (BP Location: Right arm, Patient Position: Lying)   Pulse (!) 113   Temp (P) 98.4 °F (36.9 °C) (Oral)   Resp 18   Ht 6' 3" (1.905 m)   Wt 70.8 kg (156 lb 1.4 oz)   SpO2 (!) 94%   BMI 19.51 kg/m²     Labs Reviewed and Include    Recent Labs   Lab 24  0508   *   CALCIUM 8.8   ALBUMIN 2.2*   PROT 7.4   *   K 4.0   CO2 29   CL 94*   BUN 13   CREATININE 1.0   ALKPHOS 199*   ALT 13   AST 23   BILITOT 2.2*     Lab Results   Component Value Date    WBC 10.44 2024    HGB 8.2 (L) 2024    HCT 29.1 (L) 2024    MCV 66 (L) 2024     2024     No results for input(s): "TSH", "FREET4" in the last 168 hours.  Lab Results   Component Value Date    HGBA1C 10.3 (H) 2024       Nutritional status:   Body mass index is 19.51 kg/m².  Lab Results   Component Value Date    ALBUMIN 2.2 (L) 2024    ALBUMIN 2.4 (L) 2024    ALBUMIN 2.6 (L) 2024     Lab Results   Component Value Date    PREALBUMIN 7 (L) " 08/26/2024    PREALBUMIN 8 (L) 05/20/2024    PREALBUMIN 12 (L) 04/26/2024       Estimated Creatinine Clearance: 99.3 mL/min (based on SCr of 1 mg/dL).    Accu-Checks  Recent Labs     08/25/24  0443 08/25/24  0528 08/25/24  0547 08/25/24  0629 08/25/24  0641 08/25/24  0736 08/25/24  1649 08/25/24  2135 08/26/24  0655 08/26/24  0656   POCTGLUCOSE >500* >500* >500* 500* >500* 464* 386* 244* 431* 357*       Current Medications and/or Treatments Impacting Glycemic Control  Immunotherapy:    Immunosuppressants       None          Steroids:   Hormones (From admission, onward)      None          Pressors:    Autonomic Drugs (From admission, onward)      None          Hyperglycemia/Diabetes Medications:   Antihyperglycemics (From admission, onward)      Start     Stop Route Frequency Ordered    08/26/24 0900  insulin glargine U-100 (Lantus) pen 19 Units         -- SubQ 2 times daily 08/26/24 0717    08/25/24 1645  insulin aspart U-100 pen 14 Units         -- SubQ 3 times daily with meals 08/25/24 1407    08/25/24 1506  insulin aspart U-100 pen 0-10 Units         -- SubQ Before meals & nightly PRN 08/25/24 1407            ASSESSMENT and PLAN    Cardiac/Vascular  * LVAD (left ventricular assist device) present  Managed by primary team  Optimize blood glucose control        CHF (NYHA class IV, ACC/AHA stage D)  Managed by primary team  Optimize blood glucose control      Endocrine  Type 2 diabetes mellitus with hyperglycemia, with long-term current use of insulin  Endocrinology consulted for BG management.   BG goal 140-180  Noncompliant T2DM.     - Increase Lantus (Insulin Glargine) to 19 units BID (20% increase due to elevated fasting blood glucose)  - Increase Novolog (Insulin Aspart) to 18 units TIDWM (30% increase)  - Continue moderate dose correction (150/25)  - BG checks AC/HS  - Hypoglycemia protocol in place    ** Please notify Endocrine for any change and/or advance in diet**  ** Please call Endocrine for any BG  related issues **    Discharge Planning:   TBD. Please notify endocrinology prior to discharge.  Will likely resume home regimen.  Patient requires glucometer and testing supplies.  Recommend he resumes his William for blood sugar monitoring.          Guera De Oliveira PA-C  Endocrinology  [facility] - [facility] Step[facility]

## 2024-08-26 NOTE — ASSESSMENT & PLAN NOTE
A1C 10.3  Endocrinology consulted for glucose management, largely uncontrolled       Lab Results   Component Value Date    HGBA1C 10.3 (H) 08/25/2024    HGBA1C >14.0 (H) 04/26/2024    HGBA1C >14.0 (H) 02/17/2024    [id number] 7.5 06/12/2023    [id number] 8.5 02/07/2023

## 2024-08-26 NOTE — CONSULTS
Palliative medicine consult received.  Patient known to pal med from previous encounters.    Chart reviewed and patient discussed withDr. Ayala.      Pal med arrived at bedside and patient unavailable.  Pal med returned and patient is being prepared for ECHO.      Consult pending,  Will follow up in the AM 8/27/24.      Thank you for consult and opportunity to participate in Mr. Queen's care.

## 2024-08-26 NOTE — PLAN OF CARE
Recommendations  1. Diabetic/ Cardiac diet as tolerated - Encouarge PO intake >50%.   2. If PO intake is <50%, recommend Boost GC BID to help meet needs.   3. RD following.    Goals: Meet % EEN/ ENP by f/u date  Nutrition Goal Status: new  Communication of RD Recs: POC

## 2024-08-26 NOTE — ASSESSMENT & PLAN NOTE
Malnutrition Type:  Context: acute illness or injury  Level: mild    Related to (etiology):   Poor energy intake    Signs and Symptoms (as evidenced by):   Weight Loss (Malnutrition): greater than 10% in 6 months  Energy Intake (Malnutrition): less than or equal to 50% for greater than or equal to 5 days  Subcutaneous Fat (Malnutrition): mild depletion  Muscle Mass (Malnutrition): mild depletion     Malnutrition Characteristic Summary:  Weight Loss (Malnutrition): greater than 10% in 6 months  Energy Intake (Malnutrition): less than or equal to 50% for greater than or equal to 5 days  Subcutaneous Fat (Malnutrition): mild depletion  Muscle Mass (Malnutrition): mild depletion      Interventions/Recommendations (treatment strategy):  1. Diabetic/ Cardiac diet as tolerated - Encouarge PO intake >50%. 2. If PO intake is <50%, recommend Boost GC BID to help meet needs. 3. RD following.    Nutrition Diagnosis Status:   New

## 2024-08-26 NOTE — PT/OT/SLP PROGRESS
Occupational Therapy      Patient Name:  Kevan Queen   MRN:  [id number]    Pt with CP this AM undergoing EKG. OT to HOLD this AM and OT unable to return this afternoon. OT to check status at later date.   8/26/2024

## 2024-08-26 NOTE — NURSING
LVAD dressing change completed using sterile technique with daily kit DLES is a 1 with minimal drainage noted on the drain sponge. Tolerated without any complication. no redness, does have mild tenderness noted.

## 2024-08-26 NOTE — SUBJECTIVE & OBJECTIVE
"Interval HPI:   No acute events overnight. Patient in room 319/319 A. Blood glucose  variable . BG above goal on current insulin regimen (SSI, prandial, and basal insulin ). Steroid use- None.    Renal function- Normal   Lab Results   Component Value Date    CREATININE 1.0 2024     Vasopressors-  None     Endocrine will continue to follow and manage insulin orders inpatient.     Diet diabetic Cardiac (Low Na/Chol); 2000 Calorie; Fluid - 1500mL; Standard Tray     Eatin%  Nausea: No  Hypoglycemia and intervention: No  Fever: No  TPN and/or TF: No  If yes, type of TF/TPN and rate: N/A    BP (!) (P) 88/0 (BP Location: Right arm, Patient Position: Lying)   Pulse (!) 113   Temp (P) 98.4 °F (36.9 °C) (Oral)   Resp 18   Ht 6' 3" (1.905 m)   Wt 70.8 kg (156 lb 1.4 oz)   SpO2 (!) 94%   BMI 19.51 kg/m²     Labs Reviewed and Include    Recent Labs   Lab 24  0508   *   CALCIUM 8.8   ALBUMIN 2.2*   PROT 7.4   *   K 4.0   CO2 29   CL 94*   BUN 13   CREATININE 1.0   ALKPHOS 199*   ALT 13   AST 23   BILITOT 2.2*     Lab Results   Component Value Date    WBC 10.44 2024    HGB 8.2 (L) 2024    HCT 29.1 (L) 2024    MCV 66 (L) 2024     2024     No results for input(s): "TSH", "FREET4" in the last 168 hours.  Lab Results   Component Value Date    HGBA1C 10.3 (H) 2024       Nutritional status:   Body mass index is 19.51 kg/m².  Lab Results   Component Value Date    ALBUMIN 2.2 (L) 2024    ALBUMIN 2.4 (L) 2024    ALBUMIN 2.6 (L) 2024     Lab Results   Component Value Date    PREALBUMIN 7 (L) 2024    PREALBUMIN 8 (L) 2024    PREALBUMIN 12 (L) 2024       Estimated Creatinine Clearance: 99.3 mL/min (based on SCr of 1 mg/dL).    Accu-Checks  Recent Labs     24  0443 24  0528 24  0547 24  0629 24  0641 24  0736 24  1649 24  2135 24  0655 24  0656   POCTGLUCOSE >500* " >500* >500* 500* >500* 464* 386* 244* 431* 357*       Current Medications and/or Treatments Impacting Glycemic Control  Immunotherapy:    Immunosuppressants       None          Steroids:   Hormones (From admission, onward)      None          Pressors:    Autonomic Drugs (From admission, onward)      None          Hyperglycemia/Diabetes Medications:   Antihyperglycemics (From admission, onward)      Start     Stop Route Frequency Ordered    08/26/24 0900  insulin glargine U-100 (Lantus) pen 19 Units         -- SubQ 2 times daily 08/26/24 0717    08/25/24 1645  insulin aspart U-100 pen 14 Units         -- SubQ 3 times daily with meals 08/25/24 1407    08/25/24 1506  insulin aspart U-100 pen 0-10 Units         -- SubQ Before meals & nightly PRN 08/25/24 1407

## 2024-08-26 NOTE — PROGRESS NOTES
08/26/24 0550   PICC Double Lumen 05/20/24   Placement Date: 05/20/24   Present Prior to Hospital Arrival?: Yes   Line Necessity Review Longterm central access required   Site Assessment No drainage;No redness;No swelling;No warmth   Extremity Assessment Distal to IV No abnormal discoloration;No redness;No swelling;No warmth   Line Securement Device Antimicrobial Adhesive   Dressing Type CHG impregnated dressing/sponge   Dressing Status Clean;Dry;Intact   Dressing Intervention First dressing   Date on Dressing 08/26/24   Dressing Due to be Changed 09/02/24   Distal Patency/Care flushed w/o difficulty;blood return present;normal saline locked   Proximal 1 Patency/Care flushed w/o difficulty;blood return present;normal saline locked

## 2024-08-26 NOTE — ASSESSMENT & PLAN NOTE
Endocrinology consulted for BG management.   BG goal 140-180  Noncompliant T2DM.     - Increase Lantus (Insulin Glargine) to 19 units BID (20% increase due to elevated fasting blood glucose)  - Increase Novolog (Insulin Aspart) to 18 units TIDWM (30% increase)  - Continue moderate dose correction (150/25)  - BG checks AC/HS  - Hypoglycemia protocol in place    ** Please notify Endocrine for any change and/or advance in diet**  ** Please call Endocrine for any BG related issues **    Discharge Planning:   TBD. Please notify endocrinology prior to discharge.  Will likely resume home regimen.  Patient requires glucometer and testing supplies.  Recommend he resumes his William for blood sugar monitoring.

## 2024-08-26 NOTE — ASSESSMENT & PLAN NOTE
Pt reports initial pain following running out of primacor. Does have intermittent chest pain/pressure with straining on the toilet as well but no radiation of pain or issues with nausea/vomiting/diaphoresis/sob with symptoms. S/p spinal Xrays without significant abnormality.   NO red flag symptoms on admission  C/w home gabapentin

## 2024-08-26 NOTE — ASSESSMENT & PLAN NOTE
Pt with unilateral R sided gynecomastia with hard nodular feeling beneath soft subcutaneous tissue of breast. No notable skin dimpling or rash.   -US breast soft tissue   -pt is on spironolactone, but unlikely cause given unilateral presentation

## 2024-08-26 NOTE — PT/OT/SLP PROGRESS
Occupational Therapy      Patient Name:  Kevan Queen   MRN:  [id number]    Patient not seen today secondary to:     4:29pm -  Nurse hold 2* pt fatigue after day of tests.     Will follow-up 8/27 or as medically available.    8/26/2024

## 2024-08-26 NOTE — PROGRESS NOTES
[facility] - Cardiology Stepdown  Heart Transplant  Progress Note    Patient Name: Kevan Queen  MRN: [id number]  Admission Date: 8/25/2024  Hospital Length of Stay: 1 days  Attending Physician: Dalia Crum MD  Primary Care Provider: Rosio Armendariz FNP  Principal Problem:LVAD (left ventricular assist device) present    Subjective:   Interval History: Pt doing well overnight, however does report some substernal chest pressure this AM following straining for a bowel movement. He continues to have upper back pain which he reports started following running out of his primacor. Reports present across upper back and over his spine. He is s/p spinal x rays which were largely negative except for degenerative disc disease. Trops negative this AM during episode of chest pain.     Pt with significantly elevated JVD visible on exam. Will transition pt from lasix to bumex today. Pt reports hx of rash with bumex and torsemide, however discussed bumex and torsemide were same med class as lasix. Pt was agreeable to trialing bumex this afternoon, however, and educated to report if rash. D/w floor pharmacist as well.     Discussed goals of care with pt and pts noncompliance. Pt does report he has been out of his primacor for the past 5 days and also intermittently disconnects himself from his primacor at home while walking around/doing household tasks or going to the bathroom as he feels the pump is in the way. Pt reporting somewhat conflicting goals as he states he wants his life to be more convenient/without so much burden of care, but also states he wants to be alive for his seven children. D/w pt he would not be a heart transplant candidate and also discussed need to reassessment of goals with noncompliance. Pt agreeable to talk with palliative care for more detailed discussions of goals of care going forward.       Continuous Infusions:  Scheduled Meds:   amLODIPine  5 mg Oral Daily    aspirin  81 mg Oral Daily     atorvastatin  40 mg Oral Daily    bumetanide  2 mg Oral BID loop    DULoxetine  30 mg Oral Daily    gabapentin  100 mg Oral BID    gabapentin  400 mg Oral QHS    insulin aspart U-100  18 Units Subcutaneous TIDWM    insulin glargine U-100  19 Units Subcutaneous BID    levETIRAcetam  1,500 mg Oral BID    magnesium oxide  400 mg Oral Daily    nicotine  1 patch Transdermal Daily    pantoprazole  40 mg Oral Daily    potassium, sodium phosphates  1 packet Oral QID (AC & HS)    spironolactone  25 mg Oral Daily     PRN Meds:  Current Facility-Administered Medications:     cyclobenzaprine, 5 mg, Oral, Daily PRN    dextrose 10%, 12.5 g, Intravenous, PRN    dextrose 10%, 25 g, Intravenous, PRN    glucagon (human recombinant), 1 mg, Intramuscular, PRN    glucose, 16 g, Oral, PRN    glucose, 24 g, Oral, PRN    insulin aspart U-100, 0-10 Units, Subcutaneous, QID (AC + HS) PRN    ondansetron, 4 mg, Oral, Q8H PRN    Review of patient's allergies indicates:   Allergen Reactions    Bumex [bumetanide] Hives    Torsemide Hives     Objective:     Vital Signs (Most Recent):  Temp: 98.7 °F (37.1 °C) (08/26/24 1143)  Pulse: (!) 116 (08/26/24 1203)  Resp: 18 (08/26/24 1143)  BP: (!) 86/0 (08/26/24 1143)  SpO2: 99 % (08/26/24 1143) Vital Signs (24h Range):  Temp:  [97.8 °F (36.6 °C)-98.8 °F (37.1 °C)] 98.7 °F (37.1 °C)  Pulse:  [] 116  Resp:  [18] 18  SpO2:  [94 %-99 %] 99 %  BP: ()/(0-74) 86/0     Patient Vitals for the past 72 hrs (Last 3 readings):   Weight   08/26/24 1109 70.8 kg (156 lb 1.4 oz)   08/26/24 0515 70.8 kg (156 lb 1.4 oz)   08/25/24 1255 69.4 kg (153 lb)     Body mass index is 19.51 kg/m².      Intake/Output Summary (Last 24 hours) at 8/26/2024 1416  Last data filed at 8/26/2024 1232  Gross per 24 hour   Intake 600 ml   Output 1850 ml   Net -1250 ml       Hemodynamic Parameters:  CVP:  [16 mmHg-17 mmHg] 17 mmHg    EKG- without ischemic changes this AM        Physical Exam  Constitutional:       General: He is not  in acute distress.     Appearance: He is normal weight. He is ill-appearing.   HENT:      Head: Normocephalic and atraumatic.      Right Ear: External ear normal.      Left Ear: External ear normal.      Nose: Nose normal.      Mouth/Throat:      Pharynx: Oropharynx is clear.   Neck:      Comments: Prominent JVD present on exam to angle of jaw   Cardiovascular:      Comments: Normal VAD hum  Pulmonary:      Effort: Pulmonary effort is normal.   Abdominal:      General: Abdomen is flat. Bowel sounds are normal.      Palpations: Abdomen is soft.   Musculoskeletal:      Cervical back: Normal range of motion.      Right lower leg: No edema.      Left lower leg: No edema.   Skin:     General: Skin is warm.   Neurological:      Mental Status: He is alert. Mental status is at baseline.            Significant Labs:  CBC:  Recent Labs   Lab 08/25/24 0215 08/25/24 1150 08/26/24  0508   WBC 9.87  9.87 9.96 10.44   RBC 4.55* 4.41* 4.38*   HGB 8.7* 8.5* 8.2*   HCT 29.6* 29.6* 29.1*    215 235   MCV 65.1* 67* 66*   MCH 19.1* 19.3* 18.7*   MCHC 29.4* 28.7* 28.2*     BNP:  Recent Labs   Lab 08/25/24  1150 08/26/24  0508   * 461*     CMP:  Recent Labs   Lab 08/25/24 0215 08/25/24  1150 08/25/24 2029 08/26/24  0508   GLU  --  295* 239* 374*   CALCIUM 9.0 8.8 8.7 8.8   ALBUMIN 2.6* 2.4*  --  2.2*   PROT  --  7.9  --  7.4   * 133* 134* 130*   K 3.3* 2.7* 3.4* 4.0   CO2 27 31* 32* 29   CL 87* 92* 95 94*   BUN 12.7 11 13 13   CREATININE 1.33* 1.0 0.9 1.0   ALKPHOS 208* 207*  --  199*   ALT 17 14  --  13   AST 29 24  --  23   BILITOT 2.6* 2.3*  --  2.2*      Coagulation:   Recent Labs   Lab 08/25/24  1150 08/26/24  0508   INR 4.7* 4.3*   APTT 49.5* 83.1*     LDH:  Recent Labs   Lab 08/25/24  1150 08/26/24  0508   * 274*     Microbiology:  Microbiology Results (last 7 days)       Procedure Component Value Units Date/Time    Culture, Anaerobe [[id number]] Collected: 08/25/24 1733    Order Status: Sent  Specimen: Wound from Abdomen Updated: 08/25/24 1816    Aerobic culture [[id number]] Collected: 08/25/24 1733    Order Status: Sent Specimen: Wound from Abdomen Updated: 08/25/24 1734            I have reviewed all pertinent labs within the past 24 hours.    Estimated Creatinine Clearance: 99.3 mL/min (based on SCr of 1 mg/dL).    Diagnostic Results:  I have reviewed and interpreted all pertinent imaging results/findings within the past 24 hours.  Assessment and Plan:     Mr. Kevan Thacker is a 39 year old male with stage D 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 He underwent RVAD removal and chest closure 6/30/2022.  He was weaned off  but he had to restarted due to RVF. He was eventually transitioned to milrinone (secondary to  shortage) now supposed to be on 0.25 mcg/kg/min. He has a history of unclear syncope vs seizures and is followed by neuro for this and is on Keppra.  He is being admitted after being out of [facility] for 1 week with ongoing back pain.      On 11/15/23 underwent removal of eroded [employer] scICD--no Lifevest (due to LVAD) and no plan to replace ICD as not requiring therapy post LVAD with concern for reinfection.  He has not had neurology f/u with seizure hx on keppra so coordinator to assist him with obtaining appt.      Reports back pain (primarily upper back pain) for the past 3-4 days. He reports that it started 2 days after stopping pirmacor and he feels it may be related. He has also been sleeping in the couch and identifies that it may also be related to the back pain. He denies lifting anything heavy or any falls /trauma. No red flags including incontinence of stool or urine. No numbness in the groin area or weakness in the legs or upper  extremity reported. He reports he was not able to go for appointment because of transport issue that has now been resolved. He denies orthopnea, PND, weight gain, leg swelling. He says that he does have some shortness of breath after walking long distances which has been ongoing for months now and has not changed lately. He has lost some weight over last few months since his but weight has been stable over the last month. He denied headache, constipation, diarrhea, SOB, cough, palpitations or any additional symptoms.     * LVAD (left ventricular assist device) present  -HeartMate 3 Implanted  6/29/2022  as DT  -HTS Primary  -HOLD Coumadin, Goal INR 2.0-2.5. Supratherapeutic today.   -Antiplatelets ASA 81 mg  -LDH is stable overall today. Will continue to monitor daily.  -Speed set at 5100, LSL 4700 rpm  -Interrogation notable for  power cable disconnected, low voltage advisory, PI events  -Not listed for OHTx- advised pt he would not be candidate for OHTx (noncompliance/poor f/up)   -UDS to assess for drug use, does have a history of polysubstance use.       Procedure: Device Interrogation Including analysis of device parameters  Current Settings: Ventricular Assist Device  Review of device function is stable/unstable stable        8/26/2024     8:32 AM 8/26/2024     5:59 AM 8/26/2024    12:00 AM 8/25/2024     8:00 PM 8/25/2024     4:55 PM 8/25/2024    11:02 AM 5/26/2024     8:00 PM   TXP LVAD INTERROGATIONS   Type HeartMate3 HeartMate3 HeartMate3 HeartMate3 HeartMate3 HeartMate3 HeartMate3   Flow 4.3 3.8 3.7 3.5 3.6 3.8 4.6   Speed 5100 5100 5100 5100 5100 5100 5100   PI 4.8 6.1 5.9 6 6 6.2 3.8   Power (Serna) 3.6 3.5 4 3.9 3.6 3.5 3.6   LSL  4700 4700 4700  4700 4700   Pulsatility  Intermittent pulse Intermittent pulse Intermittent pulse            CHF (NYHA class IV, ACC/AHA stage D)  Appears Euvolemic on exam     Most recent TTE 9/5/23 with EF 10-15%, mod to sever TR, LVEDd 7.11, LAVD - HM3 in place    - BNP  461, downtrending  - Obtain CXR to rule out alternate pathology  - No sick contacts or additional symptoms to suggest need for viral respiratory workup  -transition from lasix to bumex, assess if pt tolerates/see if true allergy. Pt previously on PO lasix 80 daily, however reports he was taking this twice a day at home. Will initiate bumex 2mg BID and f/up output and tolerance   -GDMT: c/w spironolactone  -Previously on home Primacor 0.25 at home but ran out approx 5 days ago and also reports he occasionally disconnects himself from his pump to perform certain activities  -Obtain TTE, ordered but still pending   - Since has been off primacor, pt does have significant JVD but does not have notable volume in extremities, though abdomen is slightly distended and may be holding fluid in abdomen. Discussed goals of care and involving palliative care to clarify goals given noncompliance.    -palliative care c/s and pt discussed with service, appreciate recommendations      Subcutaneous nodule of breast  Pt with unilateral R sided gynecomastia with hard nodular feeling beneath soft subcutaneous tissue of breast. No notable skin dimpling or rash.   -US breast soft tissue   -pt is on spironolactone, but unlikely cause given unilateral presentation       Polysubstance abuse  Pt has history of polysubstance use.   -UDS ordered given noncompliance      Supratherapeutic INR  4.7 on admission  Hold coumadin dose today - pharmacy  to manage coumadin dosing  Check INR daily       Bilateral back pain  Pt reports initial pain following running out of primacor. Does have intermittent chest pain/pressure with straining on the toilet as well but no radiation of pain or issues with nausea/vomiting/diaphoresis/sob with symptoms. S/p spinal Xrays without significant abnormality.   NO red flag symptoms on admission  C/w home gabapentin      Hypokalemia  2.7 on admission  Replace and monitor      Type 2 diabetes mellitus with hyperglycemia,  with long-term current use of insulin  A1C 10.3  Endocrinology consulted for glucose management, largely uncontrolled       Lab Results   Component Value Date    HGBA1C 10.3 (H) 08/25/2024    HGBA1C >14.0 (H) 04/26/2024    HGBA1C >14.0 (H) 02/17/2024    DUYEGWA8G 7.5 06/12/2023    YDODMFA4M 8.5 02/07/2023           Moderate malnutrition  -can consider nutrition c/s pending continued goals of care discussions w/ palliative     Seizure-like activity  C/w home keppra 1.5 gm BID        Alice Ayala MD  Heart Transplant  [facility] [place] - Cardiology Stepdown

## 2024-08-26 NOTE — PT/OT/SLP PROGRESS
Physical Therapy      Patient Name:  eKvan Queen   MRN:  [id number]    Patient not seen today secondary to: during 2 AM attempts pt in restroom. During 3rd AM attempt, pt in bed c/o chest pain. Nsg notified and EKG ordered. During 4th attempt, pt ERIC for ECHO. Will follow-up when medically appropriate.    8/26/2024

## 2024-08-26 NOTE — PLAN OF CARE
Problem: Adult Inpatient Plan of Care  Goal: Plan of Care Review  Outcome: Progressing  Goal: Patient-Specific Goal (Individualized)  Outcome: Progressing  Goal: Absence of Hospital-Acquired Illness or Injury  Outcome: Progressing  Goal: Optimal Comfort and Wellbeing  Outcome: Progressing  Goal: Readiness for Transition of Care  Outcome: Progressing     Problem: Diabetes Comorbidity  Goal: Blood Glucose Level Within Targeted Range  Outcome: Progressing     Problem: Infection  Goal: Absence of Infection Signs and Symptoms  Outcome: Progressing     Problem: Ventricular Assist Device  Goal: Optimal Adjustment to Device  Outcome: Progressing  Goal: Absence of Bleeding  Outcome: Progressing  Goal: Absence of Embolism Signs and Symptoms  Outcome: Progressing  Goal: Optimal Blood Flow  Outcome: Progressing  Goal: Absence of Infection Signs and Symptoms  Outcome: Progressing  Goal: Effective Right-Sided Heart Function  Outcome: Progressing     Problem: Ventricular Assist Device  Goal: Optimal Adjustment to Device  Outcome: Progressing  Goal: Absence of Bleeding  Outcome: Progressing  Goal: Absence of Embolism Signs and Symptoms  Outcome: Progressing  Goal: Optimal Blood Flow  Outcome: Progressing  Goal: Absence of Infection Signs and Symptoms  Outcome: Progressing  Goal: Effective Right-Sided Heart Function  Outcome: Progressing

## 2024-08-26 NOTE — PROGRESS NOTES
08/26/2024  Mirela Perez    Current provider:  Dalia Crum MD    Device interrogation:      8/26/2024     8:32 AM 8/26/2024     5:59 AM 8/26/2024    12:00 AM 8/25/2024     8:00 PM 8/25/2024     4:55 PM 8/25/2024    11:02 AM 5/26/2024     8:00 PM   TXP LVAD INTERROGATIONS   Type HeartMate3 HeartMate3 HeartMate3 HeartMate3 HeartMate3 HeartMate3 HeartMate3   Flow 4.3 3.8 3.7 3.5 3.6 3.8 4.6   Speed 5100 5100 5100 5100 5100 5100 5100   PI 4.8 6.1 5.9 6 6 6.2 3.8   Power (Serna) 3.6 3.5 4 3.9 3.6 3.5 3.6   LSL  4700 4700 4700  4700 4700   Pulsatility  Intermittent pulse Intermittent pulse Intermittent pulse             Rounded on Kevan Queen to ensure all mechanical assist device settings (IABP or VAD) were appropriate and all parameters were within limits.  I was able to ensure all back up equipment was present, the staff had no issues, and the Perfusion Department daily rounding was complete.      For implantable VADs: Interrogation of Ventricular assist device was performed with analysis of device parameters and review of device function. I have personally reviewed the interrogation findings and agree with findings as stated.     In emergency, the nursing units have been notified to contact the perfusion department either by:  Calling [phone] from 630am to 4pm Mon thru Fri, utilizing the On-Call Finder functionality of Epic and searching for Perfusion, or by contacting the hospital  from 4pm to 630am and on weekends and asking to speak with the perfusionist on call.    4:28 PM

## 2024-08-26 NOTE — CONSULTS
[facility] - Cardiology Stepdown  Adult Nutrition  Consult Note    SUMMARY     Recommendations  1. Diabetic/ Cardiac diet as tolerated - Encouarge PO intake >50%.   2. If PO intake is <50%, recommend Boost GC BID to help meet needs.   3. RD following.    Goals: Meet % EEN/ ENP by f/u date  Nutrition Goal Status: new  Communication of RD Recs: POC    Assessment and Plan    Endocrine  Moderate malnutrition  Malnutrition Type:  Context: acute illness or injury  Level: mild    Related to (etiology):   Poor energy intake    Signs and Symptoms (as evidenced by):   Weight Loss (Malnutrition): greater than 10% in 6 months  Energy Intake (Malnutrition): less than or equal to 50% for greater than or equal to 5 days  Subcutaneous Fat (Malnutrition): mild depletion  Muscle Mass (Malnutrition): mild depletion     Malnutrition Characteristic Summary:  Weight Loss (Malnutrition): greater than 10% in 6 months  Energy Intake (Malnutrition): less than or equal to 50% for greater than or equal to 5 days  Subcutaneous Fat (Malnutrition): mild depletion  Muscle Mass (Malnutrition): mild depletion      Interventions/Recommendations (treatment strategy):  1. Diabetic/ Cardiac diet as tolerated - Encouarge PO intake >50%. 2. If PO intake is <50%, recommend Boost GC BID to help meet needs. 3. RD following.    Nutrition Diagnosis Status:   New    Malnutrition Assessment  Malnutrition Context: acute illness or injury  Malnutrition Level: mild  Skin (Micronutrient): none  Nails (Micronutrient): none  Hair/Scalp (Micronutrient): none  Eyes (Micronutrient): none  Extraoral (Micronutrient): none  Gums (Micronutrient): none  Lips/Mucous Membranes (Micronutrient): none  Teeth (Micronutrient): none  Tongue (Micronutrient): none  Neck/Chest (Micronutrient): none  Musculoskeletal/Lower Extremities: none   Micronutrient Evaluation Summary: no deficiencies   Weight Loss (Malnutrition): greater than 10% in 6 months  Energy Intake (Malnutrition):  "less than or equal to 50% for greater than or equal to 5 days  Subcutaneous Fat (Malnutrition): mild depletion  Muscle Mass (Malnutrition): mild depletion   Orbital Region (Subcutaneous Fat Loss): well nourished  Upper Arm Region (Subcutaneous Fat Loss): mild depletion  Thoracic and Lumbar Region: mild depletion   [place] Region (Muscle Loss): well nourished  Clavicle Bone Region (Muscle Loss): moderate depletion  Clavicle and Acromion Bone Region (Muscle Loss): moderate depletion  Scapular Bone Region (Muscle Loss): well nourished  Dorsal Hand (Muscle Loss): well nourished  Patellar Region (Muscle Loss): well nourished  Anterior Thigh Region (Muscle Loss): mild depletion  Posterior Calf Region (Muscle Loss): mild depletion     Reason for Assessment    Reason For Assessment: consult (Nutritional Assessment & DM diet education)  Diagnosis: LVAD Present  Relevant Medical History: CHF; DM; LVAD; Polysubstance abuse  Interdisciplinary Rounds: did not attend  General Information Comments: RD to see pt per consult for nutritional assessment and elevated A1c. Pt alert in bed w/ RN present during visit. Pt denies difficulty chewing/ swallowing or known food allergies at this time. Pt reports a poor appetite this morning d/t chest discomfort (verbalized eating 25-50% of breakfast tray). Pt reports living w/ wife who usually prepares meals. Pt admits to skipping meals often and limiting his carbohydrate intake "sometimes." RD reviewed meaning of A1c and completed DM diet education on 08/29, pt w/ fair understanding. Noted a significant weight loss x 7 months (33lbs.) per EMR (02/17 - 86.2 kg; 05/26 - 76.6kg; 08/26 - 70.8 kg).  NFPE completed on 08/26, observed to have moderate malnutrition at this time (see PES statement). RD following.    Nutrition Discharge Planning: Diabetic/ Cardiac diet upon discharge; DM diet education completed on  08/26.    Nutrition Related Social Determinants of Health: SDOH: Adequate food in home " "environment     Nutrition Risk Screen    Nutrition Risk Screen: no indicators present    Nutrition/Diet History    Spiritual, Cultural Beliefs, Nondenominational Practices, Values that Affect Care: no  Food Allergies: NKFA    Anthropometrics    Temp: 98.8 °F (37.1 °C)  Height: 6' 3" (190.5 cm)  Height (inches): 75 in  Weight Method: Standard Scale  Weight: 70.8 kg (156 lb 1.4 oz)  Weight (lb): 156.09 lb  Ideal Body Weight (IBW), Male: 196 lb  % Ideal Body Weight, Male (lb): 79.64 %  BMI (Calculated): 19.5     Lab/Procedures/Meds    Pertinent Labs Reviewed: reviewed  Pertinent Labs Comments: H/h 8.2/29.1; Na+ 130; Glucose 374  Pertinent Medications Reviewed: reviewed  Pertinent Medications Comments: Atorvastatin; Furosemide; Insulin    Estimated/Assessed Needs    Weight Used For Calorie Calculations: 88.9 kg (196 lb) (IBW)  Energy Calorie Requirements (kcal): 2,361 (PAL 1.25)  Energy Need Method: [place]  Protein Requirements:  g/day (1.0-1.2 g/kg IBW)  Weight Used For Protein Calculations: 88.9 kg (196 lb) (IBW)  Fluid Requirements (mL): 1mL/kcal or per MD  Estimated Fluid Requirement Method: RDA Method  RDA Method (mL): 2  CHO Requirement: 295g/day    Nutrition Prescription Ordered    Current Diet Order: Diabetic/ Cardiac - 1500mL Fluid    Evaluation of Received Nutrient/Fluid Intake    I/O: -950mL -Since admit  Energy Calories Required: not meeting needs  Protein Required: not meeting needs  Fluid Required: not meeting needs  Comments: LBM 08/24  Tolerance: tolerating  % Intake of Estimated Energy Needs: 25 - 50 %  % Meal Intake: 25 - 50 %    Nutrition Risk    Level of Risk/Frequency of Follow-up: low - moderate (1-2x/ week)     Monitor and Evaluation    Food and Nutrient Intake: energy intake, food and beverage intake  Food and Nutrient Adminstration: diet order  Knowledge/Beliefs/Attitudes: food and nutrition knowledge/skill, beliefs and attitudes  Physical Activity and Function: nutrition-related ADLs and " IADLs, factors affecting access to physical activity  Anthropometric Measurements: height/length, weight, weight change, body mass index  Biochemical Data, Medical Tests and Procedures: electrolyte and renal panel, gastrointestinal profile, glucose/endocrine profile, inflammatory profile, lipid profile  Nutrition-Focused Physical Findings: overall appearance, extremities, muscles and bones, head and eyes, skin     Nutrition Follow-Up    RD Follow-up?: Yes    Lisa Ross, Registration Eligible, Provisional LDN

## 2024-08-26 NOTE — SUBJECTIVE & OBJECTIVE
Interval History: Pt doing well overnight, however does report some substernal chest pressure this AM following straining for a bowel movement. He continues to have upper back pain which he reports started following running out of his primacor. Reports present across upper back and over his spine. He is s/p spinal x rays which were largely negative except for degenerative disc disease. Trops negative this AM during episode of chest pain.     Pt with significantly elevated JVD visible on exam. Will transition pt from lasix to bumex today. Pt reports hx of rash with bumex and torsemide, however discussed bumex and torsemide were same med class as lasix. Pt was agreeable to trialing bumex this afternoon, however, and educated to report if rash. D/w floor pharmacist as well.     Discussed goals of care with pt and pts noncompliance. Pt does report he has been out of his primacor for the past 5 days and also intermittently disconnects himself from his primacor at home while walking around/doing household tasks or going to the bathroom as he feels the pump is in the way. Pt reporting somewhat conflicting goals as he states he wants his life to be more convenient/without so much burden of care, but also states he wants to be alive for his seven children. D/w pt he would not be a heart transplant candidate and also discussed need to reassessment of goals with noncompliance. Pt agreeable to talk with palliative care for more detailed discussions of goals of care going forward.       Continuous Infusions:  Scheduled Meds:   amLODIPine  5 mg Oral Daily    aspirin  81 mg Oral Daily    atorvastatin  40 mg Oral Daily    bumetanide  2 mg Oral BID loop    DULoxetine  30 mg Oral Daily    gabapentin  100 mg Oral BID    gabapentin  400 mg Oral QHS    insulin aspart U-100  18 Units Subcutaneous TIDWM    insulin glargine U-100  19 Units Subcutaneous BID    levETIRAcetam  1,500 mg Oral BID    magnesium oxide  400 mg Oral Daily     nicotine  1 patch Transdermal Daily    pantoprazole  40 mg Oral Daily    potassium, sodium phosphates  1 packet Oral QID (AC & HS)    spironolactone  25 mg Oral Daily     PRN Meds:  Current Facility-Administered Medications:     cyclobenzaprine, 5 mg, Oral, Daily PRN    dextrose 10%, 12.5 g, Intravenous, PRN    dextrose 10%, 25 g, Intravenous, PRN    glucagon (human recombinant), 1 mg, Intramuscular, PRN    glucose, 16 g, Oral, PRN    glucose, 24 g, Oral, PRN    insulin aspart U-100, 0-10 Units, Subcutaneous, QID (AC + HS) PRN    ondansetron, 4 mg, Oral, Q8H PRN    Review of patient's allergies indicates:   Allergen Reactions    Bumex [bumetanide] Hives    Torsemide Hives     Objective:     Vital Signs (Most Recent):  Temp: 98.7 °F (37.1 °C) (08/26/24 1143)  Pulse: (!) 116 (08/26/24 1203)  Resp: 18 (08/26/24 1143)  BP: (!) 86/0 (08/26/24 1143)  SpO2: 99 % (08/26/24 1143) Vital Signs (24h Range):  Temp:  [97.8 °F (36.6 °C)-98.8 °F (37.1 °C)] 98.7 °F (37.1 °C)  Pulse:  [] 116  Resp:  [18] 18  SpO2:  [94 %-99 %] 99 %  BP: ()/(0-74) 86/0     Patient Vitals for the past 72 hrs (Last 3 readings):   Weight   08/26/24 1109 70.8 kg (156 lb 1.4 oz)   08/26/24 0515 70.8 kg (156 lb 1.4 oz)   08/25/24 1255 69.4 kg (153 lb)     Body mass index is 19.51 kg/m².      Intake/Output Summary (Last 24 hours) at 8/26/2024 1416  Last data filed at 8/26/2024 1232  Gross per 24 hour   Intake 600 ml   Output 1850 ml   Net -1250 ml       Hemodynamic Parameters:  CVP:  [16 mmHg-17 mmHg] 17 mmHg    EKG- without ischemic changes this AM        Physical Exam  Constitutional:       General: He is not in acute distress.     Appearance: He is normal weight. He is ill-appearing.   HENT:      Head: Normocephalic and atraumatic.      Right Ear: External ear normal.      Left Ear: External ear normal.      Nose: Nose normal.      Mouth/Throat:      Pharynx: Oropharynx is clear.   Neck:      Comments: Prominent JVD present on exam to angle of  jaw   Cardiovascular:      Comments: Normal VAD hum  Pulmonary:      Effort: Pulmonary effort is normal.   Abdominal:      General: Abdomen is flat. Bowel sounds are normal.      Palpations: Abdomen is soft.   Musculoskeletal:      Cervical back: Normal range of motion.      Right lower leg: No edema.      Left lower leg: No edema.   Skin:     General: Skin is warm.   Neurological:      Mental Status: He is alert. Mental status is at baseline.            Significant Labs:  CBC:  Recent Labs   Lab 08/25/24 0215 08/25/24 1150 08/26/24  0508   WBC 9.87  9.87 9.96 10.44   RBC 4.55* 4.41* 4.38*   HGB 8.7* 8.5* 8.2*   HCT 29.6* 29.6* 29.1*    215 235   MCV 65.1* 67* 66*   MCH 19.1* 19.3* 18.7*   MCHC 29.4* 28.7* 28.2*     BNP:  Recent Labs   Lab 08/25/24 1150 08/26/24  0508   * 461*     CMP:  Recent Labs   Lab 08/25/24 0215 08/25/24 1150 08/25/24 2029 08/26/24  0508   GLU  --  295* 239* 374*   CALCIUM 9.0 8.8 8.7 8.8   ALBUMIN 2.6* 2.4*  --  2.2*   PROT  --  7.9  --  7.4   * 133* 134* 130*   K 3.3* 2.7* 3.4* 4.0   CO2 27 31* 32* 29   CL 87* 92* 95 94*   BUN 12.7 11 13 13   CREATININE 1.33* 1.0 0.9 1.0   ALKPHOS 208* 207*  --  199*   ALT 17 14  --  13   AST 29 24  --  23   BILITOT 2.6* 2.3*  --  2.2*      Coagulation:   Recent Labs   Lab 08/25/24 1150 08/26/24  0508   INR 4.7* 4.3*   APTT 49.5* 83.1*     LDH:  Recent Labs   Lab 08/25/24  1150 08/26/24  0508   * 274*     Microbiology:  Microbiology Results (last 7 days)       Procedure Component Value Units Date/Time    Culture, Anaerobe [[id number]] Collected: 08/25/24 1733    Order Status: Sent Specimen: Wound from Abdomen Updated: 08/25/24 1816    Aerobic culture [[id number]] Collected: 08/25/24 1733    Order Status: Sent Specimen: Wound from Abdomen Updated: 08/25/24 1734            I have reviewed all pertinent labs within the past 24 hours.    Estimated Creatinine Clearance: 99.3 mL/min (based on SCr of 1 mg/dL).    Diagnostic  Results:  I have reviewed and interpreted all pertinent imaging results/findings within the past 24 hours.

## 2024-08-26 NOTE — ASSESSMENT & PLAN NOTE
-HeartMate 3 Implanted  6/29/2022  as DT  -HTS Primary  -HOLD Coumadin, Goal INR 2.0-2.5. Supratherapeutic today.   -Antiplatelets ASA 81 mg  -LDH is stable overall today. Will continue to monitor daily.  -Speed set at 5100, LSL 4700 rpm  -Interrogation notable for  power cable disconnected, low voltage advisory, PI events  -Not listed for OHTx- advised pt he would not be candidate for OHTx (noncompliance/poor f/up)   -UDS to assess for drug use, does have a history of polysubstance use.       Procedure: Device Interrogation Including analysis of device parameters  Current Settings: Ventricular Assist Device  Review of device function is stable/unstable stable        8/26/2024     8:32 AM 8/26/2024     5:59 AM 8/26/2024    12:00 AM 8/25/2024     8:00 PM 8/25/2024     4:55 PM 8/25/2024    11:02 AM 5/26/2024     8:00 PM   TXP LVAD INTERROGATIONS   Type HeartMate3 HeartMate3 HeartMate3 HeartMate3 HeartMate3 HeartMate3 HeartMate3   Flow 4.3 3.8 3.7 3.5 3.6 3.8 4.6   Speed 5100 5100 5100 5100 5100 5100 5100   PI 4.8 6.1 5.9 6 6 6.2 3.8   Power (Serna) 3.6 3.5 4 3.9 3.6 3.5 3.6   LSL  4700 4700 4700  4700 4700   Pulsatility  Intermittent pulse Intermittent pulse Intermittent pulse

## 2024-08-26 NOTE — PATIENT CARE CONFERENCE
Spoke with Mr. Queen about his equipment. Self test complete on his backup controller and battery charged. MPU not with patient at this time so maintenance was not complete. Patient had no further questions or equipment needs.

## 2024-08-26 NOTE — PLAN OF CARE
Problem: Adult Inpatient Plan of Care  Goal: Plan of Care Review  Outcome: Progressing  Flowsheets (Taken 8/26/2024 1225)  Plan of Care Reviewed With: patient  Goal: Patient-Specific Goal (Individualized)  Outcome: Progressing  Goal: Absence of Hospital-Acquired Illness or Injury  Outcome: Progressing  Intervention: Identify and Manage Fall Risk  Flowsheets (Taken 8/26/2024 1225)  Safety Promotion/Fall Prevention: assistive device/personal item within reach  Intervention: Prevent Skin Injury  Flowsheets (Taken 8/26/2024 1225)  Body Position: position changed independently  Intervention: Prevent and Manage VTE (Venous Thromboembolism) Risk  Flowsheets (Taken 8/26/2024 1225)  VTE Prevention/Management: bleeding risk assessed  Intervention: Prevent Infection  Flowsheets (Taken 8/26/2024 1225)  Infection Prevention:   cohorting utilized   personal protective equipment utilized  Goal: Optimal Comfort and Wellbeing  Outcome: Progressing  Intervention: Monitor Pain and Promote Comfort  Flowsheets (Taken 8/26/2024 1225)  Pain Management Interventions: diversional activity provided  Intervention: Provide Person-Centered Care  Flowsheets (Taken 8/26/2024 1225)  Trust Relationship/Rapport:   care explained   questions encouraged   choices provided   reassurance provided  Goal: Readiness for Transition of Care  Outcome: Progressing  Intervention: Mutually Develop Transition Plan  Flowsheets (Taken 8/26/2024 1225)  Transportation Anticipated: health plan transportation     Problem: Diabetes Comorbidity  Goal: Blood Glucose Level Within Targeted Range  Outcome: Progressing  Intervention: Monitor and Manage Glycemia  Flowsheets (Taken 8/26/2024 1225)  Glycemic Management: blood glucose monitored     Problem: Infection  Goal: Absence of Infection Signs and Symptoms  Outcome: Progressing  Intervention: Prevent or Manage Infection  Flowsheets (Taken 8/26/2024 1225)  Infection Management: aseptic technique maintained  Isolation  Precautions: protective     Problem: Ventricular Assist Device  Goal: Optimal Adjustment to Device  Outcome: Progressing  Intervention: Optimize Psychosocial Response to VAD  Flowsheets (Taken 8/26/2024 1225)  Supportive Measures: active listening utilized  Goal: Absence of Bleeding  Outcome: Progressing  Intervention: Monitor and Manage Bleeding  Flowsheets (Taken 8/26/2024 1225)  Bleeding Precautions: blood pressure closely monitored  Goal: Absence of Embolism Signs and Symptoms  Outcome: Progressing  Intervention: Prevent or Manage Embolism  Flowsheets (Taken 8/26/2024 1225)  VTE Prevention/Management: bleeding risk assessed  Goal: Optimal Blood Flow  Outcome: Progressing  Goal: Absence of Infection Signs and Symptoms  Outcome: Progressing  Intervention: Prevent or Manage Infection  Flowsheets (Taken 8/26/2024 1225)  Infection Prevention:   cohorting utilized   personal protective equipment utilized  Goal: Effective Right-Sided Heart Function  Outcome: Progressing     Problem: Ventricular Assist Device  Goal: Optimal Adjustment to Device  Outcome: Progressing  Goal: Absence of Bleeding  Outcome: Progressing  Goal: Absence of Embolism Signs and Symptoms  Outcome: Progressing  Goal: Optimal Blood Flow  Outcome: Progressing  Goal: Absence of Infection Signs and Symptoms  Outcome: Progressing  Goal: Effective Right-Sided Heart Function  Outcome: Progressing     Problem: Coping Ineffective  Goal: Effective Coping  Outcome: Progressing  Intervention: Support and Enhance Coping Strategies  Flowsheets (Taken 8/26/2024 1225)  Supportive Measures: active listening utilized

## 2024-08-27 PROBLEM — I48.92 ATRIAL FLUTTER: Status: ACTIVE | Noted: 2024-08-27

## 2024-08-27 PROBLEM — R65.10 SIRS (SYSTEMIC INFLAMMATORY RESPONSE SYNDROME): Status: ACTIVE | Noted: 2024-08-27

## 2024-08-27 LAB
ALLENS TEST: ABNORMAL
ANION GAP SERPL CALC-SCNC: 10 MMOL/L (ref 8–16)
APTT PPP: 51.1 SEC (ref 21–32)
BASOPHILS # BLD AUTO: 0.04 K/UL (ref 0–0.2)
BASOPHILS # BLD AUTO: 0.04 K/UL (ref 0–0.2)
BASOPHILS NFR BLD: 0.3 % (ref 0–1.9)
BASOPHILS NFR BLD: 0.4 % (ref 0–1.9)
BUN SERPL-MCNC: 14 MG/DL (ref 6–20)
CALCIUM SERPL-MCNC: 8.9 MG/DL (ref 8.7–10.5)
CHLORIDE SERPL-SCNC: 92 MMOL/L (ref 95–110)
CO2 SERPL-SCNC: 28 MMOL/L (ref 23–29)
CREAT SERPL-MCNC: 1 MG/DL (ref 0.5–1.4)
DELSYS: ABNORMAL
DIFFERENTIAL METHOD BLD: ABNORMAL
DIFFERENTIAL METHOD BLD: ABNORMAL
EOSINOPHIL # BLD AUTO: 0 K/UL (ref 0–0.5)
EOSINOPHIL # BLD AUTO: 0 K/UL (ref 0–0.5)
EOSINOPHIL NFR BLD: 0.1 % (ref 0–8)
EOSINOPHIL NFR BLD: 0.3 % (ref 0–8)
ERYTHROCYTE [DISTWIDTH] IN BLOOD BY AUTOMATED COUNT: 27.9 % (ref 11.5–14.5)
ERYTHROCYTE [DISTWIDTH] IN BLOOD BY AUTOMATED COUNT: 28.4 % (ref 11.5–14.5)
EST. GFR  (NO RACE VARIABLE): >60 ML/MIN/1.73 M^2
GLUCOSE SERPL-MCNC: 161 MG/DL (ref 70–110)
HCO3 UR-SCNC: 34.2 MMOL/L (ref 24–28)
HCT VFR BLD AUTO: 29.1 % (ref 40–54)
HCT VFR BLD AUTO: 29.2 % (ref 40–54)
HGB BLD-MCNC: 8.3 G/DL (ref 14–18)
HGB BLD-MCNC: 8.8 G/DL (ref 14–18)
IMM GRANULOCYTES # BLD AUTO: 0.05 K/UL (ref 0–0.04)
IMM GRANULOCYTES # BLD AUTO: 0.08 K/UL (ref 0–0.04)
IMM GRANULOCYTES NFR BLD AUTO: 0.5 % (ref 0–0.5)
IMM GRANULOCYTES NFR BLD AUTO: 0.6 % (ref 0–0.5)
INR PPP: 2.7 (ref 0.8–1.2)
LDH SERPL L TO P-CCNC: 310 U/L (ref 110–260)
LYMPHOCYTES # BLD AUTO: 1.6 K/UL (ref 1–4.8)
LYMPHOCYTES # BLD AUTO: 1.8 K/UL (ref 1–4.8)
LYMPHOCYTES NFR BLD: 12.7 % (ref 18–48)
LYMPHOCYTES NFR BLD: 15.4 % (ref 18–48)
MAGNESIUM SERPL-MCNC: 2 MG/DL (ref 1.6–2.6)
MCH RBC QN AUTO: 18.9 PG (ref 27–31)
MCH RBC QN AUTO: 19.3 PG (ref 27–31)
MCHC RBC AUTO-ENTMCNC: 28.4 G/DL (ref 32–36)
MCHC RBC AUTO-ENTMCNC: 30.2 G/DL (ref 32–36)
MCV RBC AUTO: 64 FL (ref 82–98)
MCV RBC AUTO: 67 FL (ref 82–98)
MODE: ABNORMAL
MONOCYTES # BLD AUTO: 1 K/UL (ref 0.3–1)
MONOCYTES # BLD AUTO: 1 K/UL (ref 0.3–1)
MONOCYTES NFR BLD: 10 % (ref 4–15)
MONOCYTES NFR BLD: 6.9 % (ref 4–15)
NEUTROPHILS # BLD AUTO: 11.4 K/UL (ref 1.8–7.7)
NEUTROPHILS # BLD AUTO: 7.7 K/UL (ref 1.8–7.7)
NEUTROPHILS NFR BLD: 73.4 % (ref 38–73)
NEUTROPHILS NFR BLD: 79.4 % (ref 38–73)
NRBC BLD-RTO: 0 /100 WBC
NRBC BLD-RTO: 0 /100 WBC
PCO2 BLDA: 45.5 MMHG (ref 35–45)
PH SMN: 7.48 [PH] (ref 7.35–7.45)
PHOSPHATE SERPL-MCNC: 2.9 MG/DL (ref 2.7–4.5)
PLATELET # BLD AUTO: 285 K/UL (ref 150–450)
PLATELET # BLD AUTO: 309 K/UL (ref 150–450)
PMV BLD AUTO: ABNORMAL FL (ref 9.2–12.9)
PMV BLD AUTO: ABNORMAL FL (ref 9.2–12.9)
PO2 BLDA: 22 MMHG (ref 40–60)
POC BE: 11 MMOL/L
POC SATURATED O2: 41 % (ref 95–100)
POC TCO2: 36 MMOL/L (ref 24–29)
POCT GLUCOSE: 171 MG/DL (ref 70–110)
POCT GLUCOSE: 241 MG/DL (ref 70–110)
POCT GLUCOSE: 258 MG/DL (ref 70–110)
POCT GLUCOSE: 369 MG/DL (ref 70–110)
POCT GLUCOSE: 94 MG/DL (ref 70–110)
POTASSIUM SERPL-SCNC: 4.1 MMOL/L (ref 3.5–5.1)
PROTHROMBIN TIME: 28.2 SEC (ref 9–12.5)
RBC # BLD AUTO: 4.38 M/UL (ref 4.6–6.2)
RBC # BLD AUTO: 4.55 M/UL (ref 4.6–6.2)
SAMPLE: ABNORMAL
SITE: ABNORMAL
SODIUM SERPL-SCNC: 130 MMOL/L (ref 136–145)
WBC # BLD AUTO: 10.43 K/UL (ref 3.9–12.7)
WBC # BLD AUTO: 14.4 K/UL (ref 3.9–12.7)

## 2024-08-27 PROCEDURE — 87077 CULTURE AEROBIC IDENTIFY: CPT | Mod: 59 | Performed by: INTERNAL MEDICINE

## 2024-08-27 PROCEDURE — 83735 ASSAY OF MAGNESIUM: CPT | Performed by: STUDENT IN AN ORGANIZED HEALTH CARE EDUCATION/TRAINING PROGRAM

## 2024-08-27 PROCEDURE — 80048 BASIC METABOLIC PNL TOTAL CA: CPT | Performed by: STUDENT IN AN ORGANIZED HEALTH CARE EDUCATION/TRAINING PROGRAM

## 2024-08-27 PROCEDURE — 85025 COMPLETE CBC W/AUTO DIFF WBC: CPT | Performed by: STUDENT IN AN ORGANIZED HEALTH CARE EDUCATION/TRAINING PROGRAM

## 2024-08-27 PROCEDURE — 84100 ASSAY OF PHOSPHORUS: CPT | Performed by: STUDENT IN AN ORGANIZED HEALTH CARE EDUCATION/TRAINING PROGRAM

## 2024-08-27 PROCEDURE — 82803 BLOOD GASES ANY COMBINATION: CPT

## 2024-08-27 PROCEDURE — 20600001 HC STEP DOWN PRIVATE ROOM

## 2024-08-27 PROCEDURE — 87150 DNA/RNA AMPLIFIED PROBE: CPT | Performed by: INTERNAL MEDICINE

## 2024-08-27 PROCEDURE — 85025 COMPLETE CBC W/AUTO DIFF WBC: CPT | Mod: 91

## 2024-08-27 PROCEDURE — 99223 1ST HOSP IP/OBS HIGH 75: CPT | Mod: ,,, | Performed by: STUDENT IN AN ORGANIZED HEALTH CARE EDUCATION/TRAINING PROGRAM

## 2024-08-27 PROCEDURE — 25000003 PHARM REV CODE 250: Performed by: STUDENT IN AN ORGANIZED HEALTH CARE EDUCATION/TRAINING PROGRAM

## 2024-08-27 PROCEDURE — 87075 CULTR BACTERIA EXCEPT BLOOD: CPT

## 2024-08-27 PROCEDURE — 99900035 HC TECH TIME PER 15 MIN (STAT)

## 2024-08-27 PROCEDURE — 87106 FUNGI IDENTIFICATION YEAST: CPT | Performed by: INTERNAL MEDICINE

## 2024-08-27 PROCEDURE — 87186 SC STD MICRODIL/AGAR DIL: CPT | Mod: 59 | Performed by: INTERNAL MEDICINE

## 2024-08-27 PROCEDURE — 85730 THROMBOPLASTIN TIME PARTIAL: CPT | Performed by: STUDENT IN AN ORGANIZED HEALTH CARE EDUCATION/TRAINING PROGRAM

## 2024-08-27 PROCEDURE — 85610 PROTHROMBIN TIME: CPT | Performed by: STUDENT IN AN ORGANIZED HEALTH CARE EDUCATION/TRAINING PROGRAM

## 2024-08-27 PROCEDURE — 87077 CULTURE AEROBIC IDENTIFY: CPT

## 2024-08-27 PROCEDURE — 93750 INTERROGATION VAD IN PERSON: CPT | Mod: ,,, | Performed by: INTERNAL MEDICINE

## 2024-08-27 PROCEDURE — 63600175 PHARM REV CODE 636 W HCPCS: Performed by: STUDENT IN AN ORGANIZED HEALTH CARE EDUCATION/TRAINING PROGRAM

## 2024-08-27 PROCEDURE — 87070 CULTURE OTHR SPECIMN AEROBIC: CPT

## 2024-08-27 PROCEDURE — 87186 SC STD MICRODIL/AGAR DIL: CPT

## 2024-08-27 PROCEDURE — 99232 SBSQ HOSP IP/OBS MODERATE 35: CPT | Mod: ,,,

## 2024-08-27 PROCEDURE — 83615 LACTATE (LD) (LDH) ENZYME: CPT | Performed by: STUDENT IN AN ORGANIZED HEALTH CARE EDUCATION/TRAINING PROGRAM

## 2024-08-27 PROCEDURE — 25000003 PHARM REV CODE 250: Performed by: INTERNAL MEDICINE

## 2024-08-27 PROCEDURE — 87040 BLOOD CULTURE FOR BACTERIA: CPT | Mod: 59 | Performed by: INTERNAL MEDICINE

## 2024-08-27 PROCEDURE — 27000248 HC VAD-ADDITIONAL DAY

## 2024-08-27 PROCEDURE — 25000003 PHARM REV CODE 250

## 2024-08-27 PROCEDURE — 36415 COLL VENOUS BLD VENIPUNCTURE: CPT | Performed by: INTERNAL MEDICINE

## 2024-08-27 PROCEDURE — 99233 SBSQ HOSP IP/OBS HIGH 50: CPT | Mod: ,,, | Performed by: INTERNAL MEDICINE

## 2024-08-27 RX ORDER — MAGNESIUM SULFATE HEPTAHYDRATE 40 MG/ML
2 INJECTION, SOLUTION INTRAVENOUS ONCE
Status: COMPLETED | OUTPATIENT
Start: 2024-08-27 | End: 2024-08-27

## 2024-08-27 RX ORDER — CEFADROXIL 500 MG/1
500 CAPSULE ORAL EVERY 12 HOURS
Status: DISCONTINUED | OUTPATIENT
Start: 2024-08-27 | End: 2024-08-28

## 2024-08-27 RX ORDER — INSULIN GLARGINE 100 [IU]/ML
24 INJECTION, SOLUTION SUBCUTANEOUS 2 TIMES DAILY
Status: DISCONTINUED | OUTPATIENT
Start: 2024-08-27 | End: 2024-08-28

## 2024-08-27 RX ORDER — INSULIN ASPART 100 [IU]/ML
20 INJECTION, SOLUTION INTRAVENOUS; SUBCUTANEOUS
Status: DISCONTINUED | OUTPATIENT
Start: 2024-08-27 | End: 2024-08-31

## 2024-08-27 RX ORDER — WARFARIN 3 MG/1
3 TABLET ORAL DAILY
Status: DISCONTINUED | OUTPATIENT
Start: 2024-08-27 | End: 2024-08-28

## 2024-08-27 RX ORDER — POTASSIUM CHLORIDE 20 MEQ/1
40 TABLET, EXTENDED RELEASE ORAL ONCE
Status: COMPLETED | OUTPATIENT
Start: 2024-08-27 | End: 2024-08-27

## 2024-08-27 RX ADMIN — MAGNESIUM SULFATE HEPTAHYDRATE 2 G: 40 INJECTION, SOLUTION INTRAVENOUS at 12:08

## 2024-08-27 RX ADMIN — AMIODARONE HYDROCHLORIDE 0.5 MG/MIN: 1.8 INJECTION, SOLUTION INTRAVENOUS at 04:08

## 2024-08-27 RX ADMIN — INSULIN ASPART 20 UNITS: 100 INJECTION, SOLUTION INTRAVENOUS; SUBCUTANEOUS at 04:08

## 2024-08-27 RX ADMIN — DULOXETINE HYDROCHLORIDE 30 MG: 30 CAPSULE, DELAYED RELEASE ORAL at 08:08

## 2024-08-27 RX ADMIN — CEFADROXIL 500 MG: 500 CAPSULE ORAL at 08:08

## 2024-08-27 RX ADMIN — SPIRONOLACTONE 25 MG: 25 TABLET ORAL at 08:08

## 2024-08-27 RX ADMIN — LEVETIRACETAM 1500 MG: 500 TABLET, FILM COATED ORAL at 08:08

## 2024-08-27 RX ADMIN — Medication 400 MG: at 08:08

## 2024-08-27 RX ADMIN — GABAPENTIN 100 MG: 100 CAPSULE ORAL at 11:08

## 2024-08-27 RX ADMIN — INSULIN ASPART 20 UNITS: 100 INJECTION, SOLUTION INTRAVENOUS; SUBCUTANEOUS at 11:08

## 2024-08-27 RX ADMIN — INSULIN ASPART 2 UNITS: 100 INJECTION, SOLUTION INTRAVENOUS; SUBCUTANEOUS at 04:08

## 2024-08-27 RX ADMIN — GABAPENTIN 100 MG: 100 CAPSULE ORAL at 08:08

## 2024-08-27 RX ADMIN — INSULIN ASPART 10 UNITS: 100 INJECTION, SOLUTION INTRAVENOUS; SUBCUTANEOUS at 11:08

## 2024-08-27 RX ADMIN — AMLODIPINE BESYLATE 5 MG: 5 TABLET ORAL at 08:08

## 2024-08-27 RX ADMIN — POTASSIUM CHLORIDE 40 MEQ: 1500 TABLET, EXTENDED RELEASE ORAL at 12:08

## 2024-08-27 RX ADMIN — WARFARIN SODIUM 3 MG: 3 TABLET ORAL at 04:08

## 2024-08-27 RX ADMIN — BUMETANIDE 2 MG: 1 TABLET ORAL at 04:08

## 2024-08-27 RX ADMIN — ASPIRIN 81 MG: 81 TABLET, COATED ORAL at 08:08

## 2024-08-27 RX ADMIN — ATORVASTATIN CALCIUM 40 MG: 20 TABLET, FILM COATED ORAL at 08:08

## 2024-08-27 RX ADMIN — GABAPENTIN 400 MG: 400 CAPSULE ORAL at 08:08

## 2024-08-27 RX ADMIN — INSULIN GLARGINE 24 UNITS: 100 INJECTION, SOLUTION SUBCUTANEOUS at 08:08

## 2024-08-27 RX ADMIN — BUMETANIDE 2 MG: 1 TABLET ORAL at 08:08

## 2024-08-27 RX ADMIN — PANTOPRAZOLE SODIUM 40 MG: 40 TABLET, DELAYED RELEASE ORAL at 08:08

## 2024-08-27 RX ADMIN — INSULIN ASPART 18 UNITS: 100 INJECTION, SOLUTION INTRAVENOUS; SUBCUTANEOUS at 08:08

## 2024-08-27 NOTE — ASSESSMENT & PLAN NOTE
-goal INR 2-3 , INR 4.7 on admission  - pharmacy  to manage coumadin dosing, cont warfarin   Check INR daily

## 2024-08-27 NOTE — PROGRESS NOTES
[facility] Step[facility]  Endocrinology  Progress Note    Admit Date: 8/25/2024     Reason for Consult: Management of T2DM, Hyperglycemia     Surgical Procedure and Date: LVAD 06/29/2022     Diabetes diagnosis year: 2022    Home Diabetes Medications:  Levemir 20 units twice daily and Novolog SSI (150/25) per patient    How often checking glucose at home?  Has not checked in a few weeks due to running out of strips    BG readings on regimen: COLLIN  Hypoglycemia on the regimen?  Yes; rarely experiences hypoglycemic symptoms such as blurry vision and vomiting. However, does not know his blood sugar level due to running out of supplies. He will self-correct with a snack.  Missed doses on regimen?  Yes, has not had insulin in a few weeks due to running out of glucometer supplies.    Diabetes Complications include:   Hyperglycemia    Complicating diabetes co morbidities:   History of MI, CHF, and CKD      HPI:   Patient is a 39 y.o. 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 He underwent RVAD removal and chest closure 6/30/2022.  He was weaned off  but restarted due to RVF. He was transitioned to milrinone (secondary to  shortage). History of unclear syncope vs seizures and followed by neuro and is on Keppra. On 11/15/23 underwent removal of eroded [employer] scICD--no Lifevest (due to LVAD) and no plan to replace ICD as not requiring therapy post LVAD with concern for reinfection. Reported back pain (primarily upper back pain) for the past 3-4 days. He reported that it started 2 days after stopping pirmacor and he felt it may be related. Reported some shortness of breath after walking long distances which has been ongoing for months now and has not changed lately. He has lost some weight over last few months since his  "but weight has been stable over the last month. Patient admitted after being out of [facility] for 1 week with ongoing back pain. Patient stated he has not taken his insulin in a few weeks due to running out of glucometer testing supplies. He previously had a William, but is not wearing one. BG >700 at [facility]. Endo consulted for BG management.      Interval HPI:   No acute events overnight. Patient in room 319/319 A. Blood glucose  variable . BG at, above, and below goal on current insulin regimen (SSI, prandial, and basal insulin ). Steroid use- None.    Renal function- Normal   Lab Results   Component Value Date    CREATININE 1.0 2024     Vasopressors-  None     Endocrine will continue to follow and manage insulin orders inpatient.     Diet diabetic Cardiac (Low Na/Chol); 2000 Calorie; Fluid - 1500mL; Standard Tray     Eatin%  Nausea: No  Hypoglycemia and intervention: No  Fever: No  TPN and/or TF: No  If yes, type of TF/TPN and rate: N/A    BP (!) 90/0 (BP Location: Right arm, Patient Position: Lying)   Pulse 108   Temp 99.9 °F (37.7 °C) (Oral)   Resp 20   Ht 6' 3" (1.905 m)   Wt 68.9 kg (151 lb 14.4 oz)   SpO2 99%   BMI 18.99 kg/m²     Labs Reviewed and Include    Recent Labs   Lab 24  0530   *   CALCIUM 8.9   *   K 4.1   CO2 28   CL 92*   BUN 14   CREATININE 1.0     Lab Results   Component Value Date    WBC 14.40 (H) 2024    HGB 8.8 (L) 2024    HCT 29.1 (L) 2024    MCV 64 (L) 2024     2024     Recent Labs   Lab 24  1058   TSH 2.009     Lab Results   Component Value Date    HGBA1C 10.3 (H) 2024       Nutritional status:   Body mass index is 18.99 kg/m².  Lab Results   Component Value Date    ALBUMIN 2.2 (L) 2024    ALBUMIN 2.4 (L) 2024    ALBUMIN 2.6 (L) 2024     Lab Results   Component Value Date    PREALBUMIN 7 (L) 2024    PREALBUMIN 8 (L) 2024    PREALBUMIN 12 (L) 2024 "       Estimated Creatinine Clearance: 96.7 mL/min (based on SCr of 1 mg/dL).    Accu-Checks  Recent Labs     08/25/24  0641 08/25/24  0736 08/25/24  1649 08/25/24  2135 08/26/24  0655 08/26/24  0656 08/26/24  1637 08/26/24  2017 08/27/24  0140 08/27/24  0645   POCTGLUCOSE >500* 464* 386* 244* 431* 357* 204* 182* 258* 241*       Current Medications and/or Treatments Impacting Glycemic Control  Immunotherapy:    Immunosuppressants       None          Steroids:   Hormones (From admission, onward)      None          Pressors:    Autonomic Drugs (From admission, onward)      None          Hyperglycemia/Diabetes Medications:   Antihyperglycemics (From admission, onward)      Start     Stop Route Frequency Ordered    08/27/24 1130  insulin aspart U-100 pen 20 Units         -- SubQ 3 times daily with meals 08/27/24 0819    08/27/24 0900  insulin glargine U-100 (Lantus) pen 24 Units         -- SubQ 2 times daily 08/27/24 0819    08/25/24 1506  insulin aspart U-100 pen 0-10 Units         -- SubQ Before meals & nightly PRN 08/25/24 1407            ASSESSMENT and PLAN    Cardiac/Vascular  * LVAD (left ventricular assist device) present  Managed by primary team  Optimize blood glucose control        CHF (NYHA class IV, ACC/AHA stage D)  Managed by primary team  Optimize blood glucose control      Endocrine  Type 2 diabetes mellitus with hyperglycemia, with long-term current use of insulin  BG goal 140-180  Noncompliant T2DM.     - Increase Lantus (Insulin Glargine) to 24 units BID (25% increase due to elevated fasting blood glucose)  - Increase Novolog (Insulin Aspart) to 20 units TIDWM (10% increase)  - Continue moderate dose correction (150/25)  - BG checks AC/HS  - Hypoglycemia protocol in place    ** Please notify Endocrine for any change and/or advance in diet**  ** Please call Endocrine for any BG related issues **    Discharge Planning:   TBD. Please notify endocrinology prior to discharge.  Will likely resume home  regimen.  Patient requires glucometer and testing supplies.  Recommend he resumes his William for blood sugar monitoring.          Guera De Oliveira PA-C  Endocrinology  [facility] - Cardiology Stepdown

## 2024-08-27 NOTE — HPI
38 yo male with LVAD HM3 as DT, prior MSSA DLESI/bacteremia c/b empyema (on suppressive cefadroxil) and eroded ICD/pocket infection s/p removal, seizure admitted for back pain. ID consulted for abx recs. Hospital course notable for US of chest with hypoechoic area of unclear significance. DLES cx in process. Pt reported adherence to cefadroxil - denied issues with it. Denied fevers, chills, abdominal complaints or worsening drainage from DLES. Pt reported that he ran out of his primacor and had some back pain soon after. Denied problems with PICC. Reported swelling under R breast - pt states he is unclear how long he's had the swelling.

## 2024-08-27 NOTE — HPI
HPI obtained from chart review:      As per H & P Mr. 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 He underwent RVAD removal and chest closure 6/30/2022.  He was weaned off  but he had to restarted due to RVF. He was eventually transitioned to milrinone (secondary to  shortage) now supposed to be on 0.25 mcg/kg/min. He has a history of unclear syncope vs seizures and is followed by neuro for this and is on Keppra.  [facility].      On 11/15/23 underwent removal of eroded [employer] scICD--no Lifevest (due to LVAD) and no plan to replace ICD as not requiring therapy post LVAD with concern for reinfection.  He has not had neurology f/u with seizure hx on keppra so coordinator to assist him with obtaining appt.        Reports back pain (primarily upper back pain) for the past 3-4 days. He reports that it started 2 days after stopping pirmacor and he feels it may be related. He has also been sleeping in the couch and identifies that it may also be related to the back pain. He denies lifting anything heavy or any falls /trauma. No red flags including incontinence of stool or urine. No numbness in the groin area or weakness in the legs or upper extremity reported. He reports he was not able to go for appointment because of transport issue that has now been resolved. He denies orthopnea, PND, weight gain, leg swelling. He says that he does have some shortness of breath after walking long distances which has been ongoing for months now and has not changed lately. He has lost some weight over last few months since his but weight has been stable over the last month.  He denied headache, constipation, diarrhea, SOB, cough, palpitations or any additional symptoms.

## 2024-08-27 NOTE — CONSULTS
[facility] - Cardiology Stepdown  Infectious Disease  Consult Note    Patient Name: Kevan Queen  MRN: [id number]  Admission Date: 8/25/2024  Hospital Length of Stay: 2 days  Attending Physician: Dalia Crum MD  Primary Care Provider: Rosio Armendariz FNP     Isolation Status: No active isolations    Patient information was obtained from patient, past medical records, and ER records.      Inpatient consult to Infectious Diseases  Consult performed by: Laura Baldwin MD  Consult ordered by: Alice Ayala MD        Assessment/Plan:     Cardiac/Vascular  * LVAD (left ventricular assist device) present  I independently reviewed patient's lab work and images as documented. 38 yo male with LVAD HM3 as DT, prior MSSA DLESI/bacteremia c/b empyema (on suppressive cefadroxil) and eroded ICD/pocket infection s/p removal, seizure admitted for back pain that started after running out of his home primacor. Hospital course notable for US of chest with hypoechoic area of unclear significance. DLES cx in process. CT in process. Had a few runs of Aflutter, now on amio.      Recommendations:  -reordered cefadroxil 500 mg bid (suppressive therapy)  -follow up CT  -follow up cx        Thank you for your consult. I will follow-up with patient. Please contact us if you have any additional questions. Above d/w primary team.       Laura Baldwin MD  Infectious Disease  [facility] - Cardiology Stepdown    Subjective:     Principal Problem: LVAD (left ventricular assist device) present    HPI: 38 yo male with LVAD HM3 as DT, prior MSSA DLESI/bacteremia c/b empyema (on suppressive cefadroxil) and eroded ICD/pocket infection s/p removal, seizure admitted for back pain. ID consulted for abx recs. Hospital course notable for US of chest with hypoechoic area of unclear significance. DLES cx in process. Pt reported adherence to cefadroxil - denied issues with it. Denied fevers, chills, abdominal complaints or worsening  drainage from DLES. Pt reported that he ran out of his primacor and had some back pain soon after. Denied problems with PICC. Reported swelling under R breast - pt states he is unclear how long he's had the swelling.              Past Medical History:   Diagnosis Date    Acute respiratory failure with hypoxia 07/22/2023    Arthritis     Awareness alteration, transient 09/01/2022    Cardiomyopathy     CHF (congestive heart failure) 10/01/2020    COVID-19 06/03/2023    Diabetes mellitus     Dilated cardiomyopathy 10/26/2020    Drug abuse 10/2020    Headache 04/19/2023    Hyperglycemia 12/16/2022    Hyperosmolar hyperglycemic state (HHS) 05/25/2022    ICD (implantable cardioverter-defibrillator) in place 10/26/2020    Infected defibrillator now s/p removal Nov 2023 07/23/2023    Left ventricular assist device (LVAD) complication, initial encounter 06/05/2023    Muscle cramping 06/15/2022    Renal disorder     SOB (shortness of breath) 06/13/2022    Tingling in extremities 07/13/2022       Past Surgical History:   Procedure Laterality Date    APPLICATION OF WOUND VACUUM-ASSISTED CLOSURE DEVICE N/A 6/30/2022    Procedure: APPLICATION, WOUND VAC;  Surgeon: Luis F Paige MD;  Location: [facility] OR [address] [place] [address];  Service: Cardiovascular;  Laterality: N/A;  50 x 5 cm    CARDIAC DEFIBRILLATOR PLACEMENT      ECHOCARDIOGRAM,TRANSESOPHAGEAL  11/9/2023    Procedure: Transesophageal echo (GUILLERMO) intra-procedure log documentation;  Surgeon: Eron Ivy MD;  Location: [facility] EP LAB;  Service: Cardiology;;    EXTRACTION, ELECTRODE LEAD Left 11/9/2023    Procedure: EXTRACTION, ELECTRODE LEAD;  Surgeon: ADALID Leon MD;  Location: [facility] EP LAB;  Service: Cardiology;  Laterality: Left;  INFECTION, LEAD EXTRACTION, GUILLERMO, BSCI, ANES, EH,   *NO CTS BACKUP*    IMPLANTATION OF RIGHT VENTRICULAR ASSIST DEVICE (RVAD) N/A 6/29/2022    Procedure: INSERTION, RVAD;  Surgeon: Luis F Paige MD;  Location: [facility] OR [address] [place] [address];  Service:  Cardiovascular;  Laterality: N/A;    INSERTION OF GRAFT TO PERICARDIUM Right 6/30/2022    Procedure: INSERTION-RIGHT VENTRICULAR ASSIST DEVICE;  Surgeon: Luis F Paige MD;  Location: [facility] OR [facility] FLR;  Service: Cardiovascular;  Laterality: Right;    IRRIGATION OF MEDIASTINUM  6/30/2022    Procedure: IRRIGATION, MEDIASTINUM;  Surgeon: Luis F Paige MD;  Location: [facility] OR [facility] FLR;  Service: Cardiovascular;;    LEFT VENTRICULAR ASSIST DEVICE Left 6/23/2022    Procedure: INSERTION-LEFT VENTRICULAR ASSIST DEVICE;  Surgeon: Luis F Paige MD;  Location: [facility] OR [facility] FLR;  Service: Cardiovascular;  Laterality: Left;    LEFT VENTRICULAR ASSIST DEVICE N/A 6/29/2022    Procedure: INSERTION-LEFT VENTRICULAR ASSIST DEVICE;  Surgeon: Luis F Paige MD;  [facility];  Service: Cardiovascular;  Laterality: N/A;    REVISION OF SKIN POCKET FOR CARDIOVERTER-DEFIBRILLATOR  11/9/2023    Procedure: REVISION, SKIN POCKET, FOR CARDIOVERTER-DEFIBRILLATOR;  Surgeon: ADALID Leon MD;  Location: [facility] EP LAB;  Service: Cardiology;;    RIGHT HEART CATHETERIZATION Right 4/8/2022    Procedure: INSERTION, CATHETER, RIGHT HEART;  Surgeon: Luca Lopez Jr., MD;  Location: [facility] CATH LAB;  Service: Cardiology;  Laterality: Right;    RIGHT HEART CATHETERIZATION Right 4/19/2022    Procedure: INSERTION, CATHETER, RIGHT HEART;  Surgeon: Josh Pulido MD;  Location: [facility] CATH LAB;  Service: Cardiology;  Laterality: Right;    RIGHT HEART CATHETERIZATION Right 7/21/2022    Procedure: INSERTION, CATHETER, RIGHT HEART;  Surgeon: Dalia Crum MD;  Location: [facility] CATH LAB;  Service: Cardiology;  Laterality: Right;    RIGHT HEART CATHETERIZATION Right 10/31/2022    Procedure: INSERTION, CATHETER, RIGHT HEART;  Surgeon: Dalia Crum MD;  Location: [facility] CATH LAB;  Service: Cardiology;  Laterality: Right;    STERNAL WOUND CLOSURE N/A 6/30/2022    Procedure: CLOSURE, WOUND, STERNUM;  Surgeon: Luis F Paige MD;  Location:  [facility] OR 2ND FLR;  Service: Cardiovascular;  Laterality: N/A;       Review of patient's allergies indicates:   Allergen Reactions    Bumex [bumetanide] Hives    Torsemide Hives       Medications:  Medications Prior to Admission   Medication Sig    milrinone 20mg/100ml D5W, 200mcg/ml, (PRIMACOR) 20 mg/100 mL (200 mcg/mL) infusion Inject 23.6 mcg/min into the vein continuous.    warfarin (COUMADIN) 3 MG tablet Take 1 tablet (3 mg total) by mouth Daily. Starting 5/27/2024.    acetaminophen (TYLENOL) 325 MG tablet Take 2 tablets (650 mg total) by mouth every 6 (six) hours as needed for Pain.    amLODIPine (NORVASC) 5 MG tablet Take 1 tablet (5 mg total) by mouth once daily.    aspirin (ECOTRIN) 81 MG EC tablet Take 1 tablet (81 mg total) by mouth once daily.    atorvastatin (LIPITOR) 40 MG tablet Take 1 tablet (40 mg total) by mouth once daily.    blood sugar diagnostic Strp Use to test blood glucose 4 (four) times daily.    blood-glucose meter (TRUE METRIX GLUCOSE METER) Misc Use to test blood glucose 4 (four) times daily.    blood-glucose sensor (FREESTYLE LUCIUS 3 SENSOR) Dee 1 Device by Misc.(Non-Drug; Combo Route) route every 14 (fourteen) days.    cefadroxil (DURICEF) 500 MG Cap Take 1 capsule (500 mg total) by mouth every 12 (twelve) hours.    cyclobenzaprine (FLEXERIL) 5 MG tablet Take 1 tablet (5 mg total) by mouth daily as needed for Muscle spasms. To be taken prior to daily lasix dose.    DULoxetine (CYMBALTA) 30 MG capsule Take 1 capsule (30 mg total) by mouth once daily.    furosemide (LASIX) 80 MG tablet Take 1 tablet (80 mg total) by mouth once daily.    gabapentin (NEURONTIN) 100 MG capsule Take 1 capsule (100 mg total) by mouth 2 (two) times daily AND 4 capsules (400 mg total) every evening.    glucagon (BAQSIMI) 3 mg/actuation Spry 1 each by Nasal route as needed (hypoglycemia).    insulin aspart U-100 (NOVOLOG) 100 unit/mL (3 mL) InPn pen Inject 18 Units into the skin 3 (three) times daily with meals:  "MAX 94 units/daily  150-200    201-250    251-300    301-350    >350  +2 units   +4 units    +6 units    +8 units    +10 units    insulin detemir U-100, Levemir, 100 unit/mL (3 mL) SubQ InPn pen Inject 20 Units into the skin 2 (two) times daily. In the morning and before bed.    lancets 33 gauge Misc Use to test blood glucose 4 (four) times daily.    levETIRAcetam (KEPPRA) 1000 MG tablet Take 1.5 tablets (1,500 mg total) by mouth 2 (two) times daily.    magnesium oxide (MAG-OX) 400 mg (241.3 mg magnesium) tablet Take 1 tablet (400 mg total) by mouth once daily.    mirtazapine (REMERON) 15 MG tablet Take 1 tablet (15 mg total) by mouth nightly.    ondansetron (ZOFRAN-ODT) 4 MG TbDL Take 1 tablet (4 mg total) by mouth every 8 (eight) hours as needed (nausea).    pantoprazole (PROTONIX) 40 MG tablet Take 1 tablet (40 mg total) by mouth once daily.    pen needle, diabetic 32 gauge x 5/32" Ndle 1 each by Misc.(Non-Drug; Combo Route) route 4 (four) times daily.    potassium chloride SA (K-DUR,KLOR-CON M) 10 MEQ tablet Take 3 tablets (30 mEq total) by mouth once daily.    spironolactone (ALDACTONE) 25 MG tablet Take 1 tablet (25 mg total) by mouth once daily.     Antibiotics (From admission, onward)      None          Antifungals (From admission, onward)      None          Antivirals (From admission, onward)      None             Immunization History   Administered Date(s) Administered    COVID-19, MRNA, LN-S, PF (Pfizer) (Purple Cap) 12/08/2021, 12/29/2021    DTP 1985, 1985, 1985, 06/01/1987    HIB 02/05/1990    Influenza - Quadrivalent - PF *Preferred* (6 months and older) 10/16/2020, 09/24/2021, 12/16/2022    MMR 06/01/1987    OPV 1985, 1985, 06/01/1987       Family History       Problem Relation (Age of Onset)    Diverticulosis Brother    Heart attack Maternal Grandmother, Maternal Grandfather    Heart failure Father          Social History     Socioeconomic History    Marital status: " Significant Other   Tobacco Use    Smoking status: Former     Current packs/day: 0.00     Average packs/day: 0.5 packs/day for 16.6 years (8.3 ttl pk-yrs)     Types: Cigarettes     Start date: 10/1/2004     Quit date: 4/23/2021     Years since quitting: 3.3    Smokeless tobacco: Never   Substance and Sexual Activity    Alcohol use: Not Currently    Drug use: Not Currently     Types: Marijuana, MDMA (Ecstacy)    Sexual activity: Yes     Partners: Female     Birth control/protection: None     Social Determinants of Health     Financial Resource Strain: Low Risk  (8/26/2024)    Overall Financial Resource Strain (CARDIA)     Difficulty of Paying Living Expenses: Not hard at all   Food Insecurity: No Food Insecurity (8/26/2024)    Hunger Vital Sign     Worried About Running Out of Food in the Last Year: Never true     Ran Out of Food in the Last Year: Never true   Transportation Needs: No Transportation Needs (8/26/2024)    TRANSPORTATION NEEDS     Transportation : No   Physical Activity: Unknown (3/21/2024)    Exercise Vital Sign     Days of Exercise per Week: Patient declined     Minutes of Exercise per Session: 0 min   Stress: No Stress Concern Present (8/26/2024)    [employer] [place] [employer] - Occupational Stress Questionnaire     Feeling of Stress : Not at all   Housing Stability: Low Risk  (8/26/2024)    Housing Stability Vital Sign     Unable to Pay for Housing in the Last Year: No     Homeless in the Last Year: No     Review of Systems   Constitutional:  Negative for chills and fever.   Gastrointestinal:  Negative for abdominal pain.   Musculoskeletal:  Positive for back pain.        Swelling in R chest   Skin:  Positive for wound.   All other systems reviewed and are negative.    Objective:     Vital Signs (Most Recent):  Temp: 98.3 °F (36.8 °C) (08/27/24 1145)  Pulse: 96 (08/27/24 1200)  Resp: 18 (08/27/24 1145)  BP: (!) 90/0 (08/27/24 1200)  SpO2: 99 % (08/27/24 1057) Vital Signs (24h  Range):  Temp:  [96.6 °F (35.9 °C)-99.9 °F (37.7 °C)] 98.3 °F (36.8 °C)  Pulse:  [] 96  Resp:  [18-20] 18  SpO2:  [92 %-99 %] 99 %  BP: (78-90)/(0) 90/0     Weight: 68.9 kg (151 lb 14.4 oz)  Body mass index is 18.99 kg/m².    Estimated Creatinine Clearance: 96.7 mL/min (based on SCr of 1 mg/dL).     Physical Exam  Constitutional:       General: He is not in acute distress.     Appearance: He is not ill-appearing or toxic-appearing.   Pulmonary:      Effort: Pulmonary effort is normal. No respiratory distress.   Abdominal:      General: There is no distension.      Palpations: Abdomen is soft.      Tenderness: There is no abdominal tenderness.      Comments: LVAD dressed - nontender   Musculoskeletal:      Right lower leg: No edema.      Left lower leg: No edema.   Skin:     General: Skin is warm and dry.      Comments: Swelling under R nipple - nontender, no fluctuance appreciated  R picc   Neurological:      Mental Status: He is alert and oriented to person, place, and time.          Significant Labs:   Microbiology Results (last 7 days)       Procedure Component Value Units Date/Time    Culture, Anaerobe [[id number]]     Order Status: No result Specimen: Skin from Abdomen     Aerobic culture [[id number]]     Order Status: No result Specimen: Skin from Abdomen     Blood culture [[id number]]     Order Status: No result Specimen: Blood     Blood culture [[id number]]     Order Status: No result Specimen: Blood     Culture, Anaerobe [[id number]] Collected: 08/25/24 1733    Order Status: Completed Specimen: Wound from Abdomen Updated: 08/27/24 0703     Anaerobic Culture Culture in progress    Narrative:      Drive line exit site    Aerobic culture [[id number]] Collected: 08/25/24 1733    Order Status: Sent Specimen: Wound from Abdomen Updated: 08/25/24 1734            Significant Imaging: I have reviewed all pertinent imaging results/findings within the past 24 hours.

## 2024-08-27 NOTE — CLINICAL REVIEW
IP Sepsis Screen (most recent)       Sepsis Screen (IP) - 08/27/24 0838       Is the patient's history or complaint suggestive of a possible infection? Yes  -WL    Are there at least two of the following signs and symptoms present? Yes  -WL    Sepsis signs/symptoms - Tachycardia Tachycardia     >90  -WL    Sepsis signs/symptoms - WBC WBC < 4,000 or WBC > 12,000  -WL    Are any of the following organ dysfunction criteria present and not considered to be due to a chronic condition? Yes  -WL    Organ Dysfunction Criteria Total Bilirubin > 2.0 Platelet count < 100,000  -WL    Organ Dysfunction Criteria INR > 1.5 or aPTT > 60  -WL    Initiate Sepsis Protocol No  -WL    Reason sepsis not considered Directed by provider   Secure chat with Alice Ayala MD-will check pt to see if infectious w/u needed/repeat labs -WL              User Key  (r) = Recorded By, (t) = Taken By, (c) = Cosigned By      Initials Name    Ramya Santos RN

## 2024-08-27 NOTE — ASSESSMENT & PLAN NOTE
Pt with unilateral R sided gynecomastia with hard nodular feeling beneath soft subcutaneous tissue of breast. No notable skin dimpling or rash.   -US breast soft tissue showed mass vs fluid collection (abscess vs hematoma), will obtain CT C/A/P for further evaluation (to image driveline site as well)   -pt is on spironolactone, but unlikely cause given unilateral presentation

## 2024-08-27 NOTE — ASSESSMENT & PLAN NOTE
-HeartMate 3 Implanted  6/29/2022  as DT  -HTS Primary  -cont coumadin, Goal INR 2.0-3.0.   Lab Results   Component Value Date    INR 2.3 (H) 08/28/2024    INR 2.7 (H) 08/27/2024    INR 4.3 (H) 08/26/2024       -Antiplatelets ASA 81 mg  -LDH is stable overall today. Will continue to monitor daily.  -Speed set at 5100, LSL 4700 rpm  -Interrogation notable for  power cable disconnected, low voltage advisory, PI events  -Not listed for OHTx- advised pt he would not be candidate for OHTx (noncompliance/poor f/up)   -UDS negative  -attempted to call partner Robyn, unfortunately goes to        Procedure: Device Interrogation Including analysis of device parameters  Current Settings: Ventricular Assist Device  Review of device function is stable/unstable stable        8/28/2024     8:08 AM 8/28/2024     3:34 AM 8/27/2024    11:51 PM 8/27/2024     7:37 PM 8/27/2024     3:59 PM 8/27/2024    11:54 AM 8/27/2024     8:06 AM   TXP LVAD INTERROGATIONS   Type HeartMate3 HeartMate3 HeartMate3 HeartMate3 HeartMate3 HeartMate3 HeartMate3   Flow 4.2 3.7 3.8 4 3.6 4.1 3.7   Speed 5100 5100 5100 5100 5100 5100 5100   PI 5 6 6.2 5.4 6.1 5.3 5.4   Power (Serna) 3.7 3.5 3.5 3.5 3.6 3.6 3.6   LSL  4700 4700 4700  4700 4700   Pulsatility  Intermittent pulse Intermittent pulse Intermittent pulse

## 2024-08-27 NOTE — ASSESSMENT & PLAN NOTE
OVN pt went into atach vs aflutter. Pt was started on amiodarone, with rhythm aborted to NSR following bolus. BP remained stable.   -BMP, replace lytes as needed

## 2024-08-27 NOTE — PT/OT/SLP PROGRESS
Physical Therapy      Patient Name:  Kevan Queen   MRN:  [id number]    Patient discharged from PT services. Per OT, pt is independent with mobility and has acute therapy needs at this time.    Ambulate 3x/day with nsg per progressive mobility protocol - independent with emergency bag present    8/27/2024

## 2024-08-27 NOTE — NURSING
Patient transported via wheelchair to CT. Patient sent with LVAD equipment and VAD emergency bag.

## 2024-08-27 NOTE — PROGRESS NOTES
[facility] - Cardiology Stepdown  Heart Transplant  Progress Note    Patient Name: Kevan Queen  MRN: [id number]  Admission Date: 8/25/2024  Hospital Length of Stay: 2 days  Attending Physician: Dalia Crum MD  Primary Care Provider: Rosio Armendariz FNP  Principal Problem:LVAD (left ventricular assist device) present    Subjective:   No new subjective & objective note has been filed under this hospital service since the last note was generated.    Assessment and Plan:     Mr. Kevan Thacker is a 39 year old male with stage D 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 He underwent RVAD removal and chest closure 6/30/2022.  He was weaned off  but he had to restarted due to RVF. He was eventually transitioned to milrinone (secondary to  shortage) now supposed to be on 0.25 mcg/kg/min. He has a history of unclear syncope vs seizures and is followed by neuro for this and is on Keppra.  He is being admitted after being out of [facility] for 1 week with ongoing back pain.      On 11/15/23 underwent removal of eroded [employer] scICD--no Lifevest (due to LVAD) and no plan to replace ICD as not requiring therapy post LVAD with concern for reinfection.  He has not had neurology f/u with seizure hx on keppra so coordinator to assist him with obtaining appt.      Reports back pain (primarily upper back pain) for the past 3-4 days. He reports that it started 2 days after stopping pirmacor and he feels it may be related. He has also been sleeping in the couch and identifies that it may also be related to the back pain. He denies lifting anything heavy or any falls /trauma. No red flags including incontinence of stool or urine. No numbness in the groin area or weakness in the  legs or upper extremity reported. He reports he was not able to go for appointment because of transport issue that has now been resolved. He denies orthopnea, PND, weight gain, leg swelling. He says that he does have some shortness of breath after walking long distances which has been ongoing for months now and has not changed lately. He has lost some weight over last few months since his but weight has been stable over the last month. He denied headache, constipation, diarrhea, SOB, cough, palpitations or any additional symptoms.     * LVAD (left ventricular assist device) present  -HeartMate 3 Implanted  6/29/2022  as DT  -HTS Primary  -restart coumadin, Goal INR 2.0-3.0. INR of 2.7 today   -Antiplatelets ASA 81 mg  -LDH is stable overall today. Will continue to monitor daily.  -Speed set at 5100, LSL 4700 rpm  -Interrogation notable for  power cable disconnected, low voltage advisory, PI events  -Not listed for OHTx- advised pt he would not be candidate for OHTx (noncompliance/poor f/up)   -UDS negative  -attempted to call partner Robyn, unfortunately goes to        Procedure: Device Interrogation Including analysis of device parameters  Current Settings: Ventricular Assist Device  Review of device function is stable/unstable stable        8/27/2024    11:54 AM 8/27/2024     8:06 AM 8/27/2024     4:22 AM 8/27/2024    12:14 AM 8/26/2024     8:03 PM 8/26/2024     5:17 PM 8/26/2024    12:00 PM   TXP LVAD INTERROGATIONS   Type HeartMate3 HeartMate3 HeartMate3 HeartMate3 HeartMate3 HeartMate3 HeartMate3   Flow 4.1 3.7 3.7 3.8 4.2 4.6 4.6   Speed 5100 5100 5100 5100 5100 5100 5100   PI 5.3 5.4 5.4 5.8 4.8 4.8 4.7   Power (Serna) 3.6 3.6 3.6 3.6 3.6 3.5 3.7   LSL 4700 4700 4700 4700 4700     Pulsatility   No Pulse  Intermittent pulse           CHF (NYHA class IV, ACC/AHA stage D)    Updated 8/26/24:  EF 5-10% global hypokinesis, RV enlargement and global hypokinesis, biatrial enlargement, mild-to-moderate MR, severe  TR, no van HeartMate 3 in place, aortic valve does not open, LVEDd 7.2  Previous TTE 9/5/23 with EF 10-15%, mod to sever TR, LVEDd 7.11, LVAD - HM3 in place    - No sick contacts or additional symptoms to suggest need for viral respiratory workup  -tolerated Bumex 2 mg well, net negative 3 L, no issues with any allergic reaction, home regimen:  on p.o. Lasix 80 mg daily b.i.d..    -GDMT: c/w spironolactone  -Previously on home Primacor 0.25 at home but ran out approx 5 days prior to admission and also reports he occasionally disconnects himself from his pump to perform certain activities. Hold off on restarting medication.  -Obtain TTE, ordered but still pending   - continues to have significant JVD  -palliative care c/s and pt discussed with service, appreciate recommendations      SIRS (systemic inflammatory response syndrome)  Pt technically meets SIRS criteria due to elevated WBC and tachycardia, though tachycardia is in the setting of aflutter requiring amiodarone. Pt does remain tachycardic today but is in NSR. Possible sources of infection: driveline site (though cultures negative thus far from admission, will repeat cultures), and mass vs fluid collection in R breast.   -CT chest/abdomen and pelvis   -re-culture driveline site  -repeat blood cultures  -ID consult placed, appreciate recommendations    Atrial flutter  OVN pt went into atach vs aflutter. Pt was started on amiodarone, with rhythm aborted to NSR following bolus. BP remained stable.   -BMP, replace lytes as needed       Subcutaneous nodule of breast  Pt with unilateral R sided gynecomastia with hard nodular feeling beneath soft subcutaneous tissue of breast. No notable skin dimpling or rash.   -US breast soft tissue showed mass vs fluid collection (abscess vs hematoma), will obtain CT C/A/P for further evaluation (to image driveline site as well)   -pt is on spironolactone, but unlikely cause given unilateral presentation       Supratherapeutic  INR  -goal INR 2-3 , INR 4.7 on admission  - pharmacy  to manage coumadin dosing, will restart today   Check INR daily       Bilateral back pain  Pt reports initial pain following running out of primacor. Does have intermittent chest pain/pressure with straining on the toilet as well but no radiation of pain or issues with nausea/vomiting/diaphoresis/sob with symptoms. S/p spinal Xrays without significant abnormality.   NO red flag symptoms on admission  C/w home gabapentin, added tylenol prn and lidocaine patches   -CT T/A/P pending, will get Spine CT read eval if appropriate      Polysubstance abuse  Pt has history of polysubstance use.   -UDS ordered given noncompliance      Hypokalemia  2.7 on admission  Replace and monitor      Type 2 diabetes mellitus with hyperglycemia, with long-term current use of insulin  A1C 10.3  Endocrinology consulted for glucose management, largely uncontrolled       Lab Results   Component Value Date    HGBA1C 10.3 (H) 08/25/2024    HGBA1C >14.0 (H) 04/26/2024    HGBA1C >14.0 (H) 02/17/2024    GADKKYG1S 7.5 06/12/2023    FCVKICX9N 8.5 02/07/2023           Moderate malnutrition  -can consider nutrition c/s pending continued goals of care discussions w/ palliative     Seizure-like activity  C/w home keppra 1.5 gm BID        Alice Ayala MD  Heart Transplant  [facility] - Cardiology Stepdown

## 2024-08-27 NOTE — SUBJECTIVE & OBJECTIVE
Past Medical History:   Diagnosis Date    Acute respiratory failure with hypoxia 07/22/2023    Arthritis     Awareness alteration, transient 09/01/2022    Cardiomyopathy     CHF (congestive heart failure) 10/01/2020    COVID-19 06/03/2023    Diabetes mellitus     Dilated cardiomyopathy 10/26/2020    Drug abuse 10/2020    Headache 04/19/2023    Hyperglycemia 12/16/2022    Hyperosmolar hyperglycemic state (HHS) 05/25/2022    ICD (implantable cardioverter-defibrillator) in place 10/26/2020    Infected defibrillator now s/p removal Nov 2023 07/23/2023    Left ventricular assist device (LVAD) complication, initial encounter 06/05/2023    Muscle cramping 06/15/2022    Renal disorder     SOB (shortness of breath) 06/13/2022    Tingling in extremities 07/13/2022       Past Surgical History:   Procedure Laterality Date    APPLICATION OF WOUND VACUUM-ASSISTED CLOSURE DEVICE N/A 6/30/2022    Procedure: APPLICATION, WOUND VAC;  Surgeon: Luis F Paige MD;  Location: [facility] OR [address] [place] [address];  Service: Cardiovascular;  Laterality: N/A;  50 x 5 cm    CARDIAC DEFIBRILLATOR PLACEMENT      ECHOCARDIOGRAM,TRANSESOPHAGEAL  11/9/2023    Procedure: Transesophageal echo (GUILLERMO) intra-procedure log documentation;  Surgeon: Eron Ivy MD;  Location: [facility] EP LAB;  Service: Cardiology;;    EXTRACTION, ELECTRODE LEAD Left 11/9/2023    Procedure: EXTRACTION, ELECTRODE LEAD;  Surgeon: ADALID Leon MD;  Location: [facility] EP LAB;  Service: Cardiology;  Laterality: Left;  INFECTION, LEAD EXTRACTION, GUILLERMO, BSCI, ANES, EH,   *NO CTS BACKUP*    IMPLANTATION OF RIGHT VENTRICULAR ASSIST DEVICE (RVAD) N/A 6/29/2022    Procedure: INSERTION, RVAD;  Surgeon: Luis F Paige MD;  Location: [facility] OR [address] [place] [address];  Service: Cardiovascular;  Laterality: N/A;    INSERTION OF GRAFT TO PERICARDIUM Right 6/30/2022    Procedure: INSERTION-RIGHT VENTRICULAR ASSIST DEVICE;  Surgeon: Luis F Paige MD;  Location: [facility] OR [address] [place] [address];  Service: Cardiovascular;  Laterality:  Right;    IRRIGATION OF MEDIASTINUM  6/30/2022    Procedure: IRRIGATION, MEDIASTINUM;  Surgeon: Luis F Paige MD;  [facility];  Service: Cardiovascular;;    LEFT VENTRICULAR ASSIST DEVICE Left 6/23/2022    Procedure: INSERTION-LEFT VENTRICULAR ASSIST DEVICE;  Surgeon: Luis F Paige MD;  Location: [facility] OR [facility] FLR;  Service: Cardiovascular;  Laterality: Left;    LEFT VENTRICULAR ASSIST DEVICE N/A 6/29/2022    Procedure: INSERTION-LEFT VENTRICULAR ASSIST DEVICE;  Surgeon: Luis F Paige MD;  Location: [facility] OR [facility] FLR;  Service: Cardiovascular;  Laterality: N/A;    REVISION OF SKIN POCKET FOR CARDIOVERTER-DEFIBRILLATOR  11/9/2023    Procedure: REVISION, SKIN POCKET, FOR CARDIOVERTER-DEFIBRILLATOR;  Surgeon: ADALID Leon MD;  Location: [facility] EP LAB;  Service: Cardiology;;    RIGHT HEART CATHETERIZATION Right 4/8/2022    Procedure: INSERTION, CATHETER, RIGHT HEART;  Surgeon: Luca Lopez Jr., MD;  Location: [facility] CATH LAB;  Service: Cardiology;  Laterality: Right;    RIGHT HEART CATHETERIZATION Right 4/19/2022    Procedure: INSERTION, CATHETER, RIGHT HEART;  Surgeon: Josh Pulido MD;  Location: [facility] CATH LAB;  Service: Cardiology;  Laterality: Right;    RIGHT HEART CATHETERIZATION Right 7/21/2022    Procedure: INSERTION, CATHETER, RIGHT HEART;  Surgeon: Dalia Crum MD;  Location: [facility] CATH LAB;  Service: Cardiology;  Laterality: Right;    RIGHT HEART CATHETERIZATION Right 10/31/2022    Procedure: INSERTION, CATHETER, RIGHT HEART;  Surgeon: Dalia Crum MD;  Location: [facility] CATH LAB;  Service: Cardiology;  Laterality: Right;    STERNAL WOUND CLOSURE N/A 6/30/2022    Procedure: CLOSURE, WOUND, STERNUM;  Surgeon: Luis F Paige MD;  [facility];  Service: Cardiovascular;  Laterality: N/A;       Review of patient's allergies indicates:   Allergen Reactions    Bumex [bumetanide] Hives    Torsemide Hives       Medications:  Medications Prior to Admission   Medication  Sig    milrinone 20mg/100ml D5W, 200mcg/ml, (PRIMACOR) 20 mg/100 mL (200 mcg/mL) infusion Inject 23.6 mcg/min into the vein continuous.    warfarin (COUMADIN) 3 MG tablet Take 1 tablet (3 mg total) by mouth Daily. Starting 5/27/2024.    acetaminophen (TYLENOL) 325 MG tablet Take 2 tablets (650 mg total) by mouth every 6 (six) hours as needed for Pain.    amLODIPine (NORVASC) 5 MG tablet Take 1 tablet (5 mg total) by mouth once daily.    aspirin (ECOTRIN) 81 MG EC tablet Take 1 tablet (81 mg total) by mouth once daily.    atorvastatin (LIPITOR) 40 MG tablet Take 1 tablet (40 mg total) by mouth once daily.    blood sugar diagnostic Strp Use to test blood glucose 4 (four) times daily.    blood-glucose meter (TRUE METRIX GLUCOSE METER) Misc Use to test blood glucose 4 (four) times daily.    blood-glucose sensor (FREESTYLE LUCIUS 3 SENSOR) Dee 1 Device by Misc.(Non-Drug; Combo Route) route every 14 (fourteen) days.    cefadroxil (DURICEF) 500 MG Cap Take 1 capsule (500 mg total) by mouth every 12 (twelve) hours.    cyclobenzaprine (FLEXERIL) 5 MG tablet Take 1 tablet (5 mg total) by mouth daily as needed for Muscle spasms. To be taken prior to daily lasix dose.    DULoxetine (CYMBALTA) 30 MG capsule Take 1 capsule (30 mg total) by mouth once daily.    furosemide (LASIX) 80 MG tablet Take 1 tablet (80 mg total) by mouth once daily.    gabapentin (NEURONTIN) 100 MG capsule Take 1 capsule (100 mg total) by mouth 2 (two) times daily AND 4 capsules (400 mg total) every evening.    glucagon (BAQSIMI) 3 mg/actuation Spry 1 each by Nasal route as needed (hypoglycemia).    insulin aspart U-100 (NOVOLOG) 100 unit/mL (3 mL) InPn pen Inject 18 Units into the skin 3 (three) times daily with meals: MAX 94 units/daily  150-200    201-250    251-300    301-350    >350  +2 units   +4 units    +6 units    +8 units    +10 units    insulin detemir U-100, Levemir, 100 unit/mL (3 mL) SubQ InPn pen Inject 20 Units into the skin 2 (two) times  "daily. In the morning and before bed.    lancets 33 gauge Misc Use to test blood glucose 4 (four) times daily.    levETIRAcetam (KEPPRA) 1000 MG tablet Take 1.5 tablets (1,500 mg total) by mouth 2 (two) times daily.    magnesium oxide (MAG-OX) 400 mg (241.3 mg magnesium) tablet Take 1 tablet (400 mg total) by mouth once daily.    mirtazapine (REMERON) 15 MG tablet Take 1 tablet (15 mg total) by mouth nightly.    ondansetron (ZOFRAN-ODT) 4 MG TbDL Take 1 tablet (4 mg total) by mouth every 8 (eight) hours as needed (nausea).    pantoprazole (PROTONIX) 40 MG tablet Take 1 tablet (40 mg total) by mouth once daily.    pen needle, diabetic 32 gauge x 5/32" Ndle 1 each by Misc.(Non-Drug; Combo Route) route 4 (four) times daily.    potassium chloride SA (K-DUR,KLOR-CON M) 10 MEQ tablet Take 3 tablets (30 mEq total) by mouth once daily.    spironolactone (ALDACTONE) 25 MG tablet Take 1 tablet (25 mg total) by mouth once daily.     Antibiotics (From admission, onward)      None          Antifungals (From admission, onward)      None          Antivirals (From admission, onward)      None             Immunization History   Administered Date(s) Administered    COVID-19, MRNA, LN-S, PF (Pfizer) (Purple Cap) 12/08/2021, 12/29/2021    DTP 1985, 1985, 1985, 06/01/1987    HIB 02/05/1990    Influenza - Quadrivalent - PF *Preferred* (6 months and older) 10/16/2020, 09/24/2021, 12/16/2022    MMR 06/01/1987    OPV 1985, 1985, 06/01/1987       Family History       Problem Relation (Age of Onset)    Diverticulosis Brother    Heart attack Maternal Grandmother, Maternal Grandfather    Heart failure Father          Social History     Socioeconomic History    Marital status: Significant Other   Tobacco Use    Smoking status: Former     Current packs/day: 0.00     Average packs/day: 0.5 packs/day for 16.6 years (8.3 ttl pk-yrs)     Types: Cigarettes     Start date: 10/1/2004     Quit date: 4/23/2021     Years since " quitting: 3.3    Smokeless tobacco: Never   Substance and Sexual Activity    Alcohol use: Not Currently    Drug use: Not Currently     Types: Marijuana, MDMA (Ecstacy)    Sexual activity: Yes     Partners: Female     Birth control/protection: None     Social Determinants of Health     Financial Resource Strain: Low Risk  (8/26/2024)    Overall Financial Resource Strain (CARDIA)     Difficulty of Paying Living Expenses: Not hard at all   Food Insecurity: No Food Insecurity (8/26/2024)    Hunger Vital Sign     Worried About Running Out of Food in the Last Year: Never true     Ran Out of Food in the Last Year: Never true   Transportation Needs: No Transportation Needs (8/26/2024)    TRANSPORTATION NEEDS     Transportation : No   Physical Activity: Unknown (3/21/2024)    Exercise Vital Sign     Days of Exercise per Week: Patient declined     Minutes of Exercise per Session: 0 min   Stress: No Stress Concern Present (8/26/2024)    [employer] - Occupational Stress Questionnaire     Feeling of Stress : Not at all   Housing Stability: Low Risk  (8/26/2024)    Housing Stability Vital Sign     Unable to Pay for Housing in the Last Year: No     Homeless in the Last Year: No     Review of Systems   Constitutional:  Negative for chills and fever.   Gastrointestinal:  Negative for abdominal pain.   Musculoskeletal:  Positive for back pain.        Swelling in R chest   Skin:  Positive for wound.   All other systems reviewed and are negative.    Objective:     Vital Signs (Most Recent):  Temp: 98.3 °F (36.8 °C) (08/27/24 1145)  Pulse: 96 (08/27/24 1200)  Resp: 18 (08/27/24 1145)  BP: (!) 90/0 (08/27/24 1200)  SpO2: 99 % (08/27/24 1057) Vital Signs (24h Range):  Temp:  [96.6 °F (35.9 °C)-99.9 °F (37.7 °C)] 98.3 °F (36.8 °C)  Pulse:  [] 96  Resp:  [18-20] 18  SpO2:  [92 %-99 %] 99 %  BP: (78-90)/(0) 90/0     Weight: 68.9 kg (151 lb 14.4 oz)  Body mass index is 18.99 kg/m².    Estimated Creatinine  Clearance: 96.7 mL/min (based on SCr of 1 mg/dL).     Physical Exam  Constitutional:       General: He is not in acute distress.     Appearance: He is not ill-appearing or toxic-appearing.   Pulmonary:      Effort: Pulmonary effort is normal. No respiratory distress.   Abdominal:      General: There is no distension.      Palpations: Abdomen is soft.      Tenderness: There is no abdominal tenderness.      Comments: LVAD dressed - nontender   Musculoskeletal:      Right lower leg: No edema.      Left lower leg: No edema.   Skin:     General: Skin is warm and dry.      Comments: Swelling under R nipple - nontender, no fluctuance appreciated  R picc   Neurological:      Mental Status: He is alert and oriented to person, place, and time.          Significant Labs:   Microbiology Results (last 7 days)       Procedure Component Value Units Date/Time    Culture, Anaerobe [[id number]]     Order Status: No result Specimen: Skin from Abdomen     Aerobic culture [[id number]]     Order Status: No result Specimen: Skin from Abdomen     Blood culture [[id number]]     Order Status: No result Specimen: Blood     Blood culture [[id number]]     Order Status: No result Specimen: Blood     Culture, Anaerobe [[id number]] Collected: 08/25/24 1733    Order Status: Completed Specimen: Wound from Abdomen Updated: 08/27/24 0703     Anaerobic Culture Culture in progress    Narrative:      Drive line exit site    Aerobic culture [[id number]] Collected: 08/25/24 1733    Order Status: Sent Specimen: Wound from Abdomen Updated: 08/25/24 1734            Significant Imaging: I have reviewed all pertinent imaging results/findings within the past 24 hours.

## 2024-08-27 NOTE — PT/OT/SLP PROGRESS
Occupational Therapy Screen    Patient Name: Kevan Queen  MRN: [id number]    Pt admitted to [facility] and is s/p LVAD implantation 6/2022. Pt reports functioning at his baseline in ADLs, functional mobility, as well as LVAD management. He denies any LOB, dizziness/lightheadedness, or SOB with activity. Pt educated on continueing performance of daily activities and functional mobility in order to prevent a decline in function. OT to sign off. Please re-consult if pt's functional status declines warranting acute skilled OT services.     8/27/24    Units billed: 0

## 2024-08-27 NOTE — NURSING
LVAD dressing change completed using sterile technique with daily kit with minimal drainage noted on the drain sponge. Tolerated without any complication. Sutures remain intact, no redness, or tenderness noted.

## 2024-08-27 NOTE — ASSESSMENT & PLAN NOTE
Pt technically meets SIRS criteria due to elevated WBC and tachycardia, though tachycardia is in the setting of aflutter requiring amiodarone. Pt does remain tachycardic today but is in NSR. Possible sources of infection: driveline site (though cultures negative thus far from admission, will repeat cultures), and mass vs fluid collection in R breast.   -CT chest/abdomen and pelvis   -re-culture driveline site  -repeat blood cultures  -ID consult placed, appreciate recommendations

## 2024-08-27 NOTE — PROGRESS NOTES
08/26/24 2130   Vital Signs   Pulse (!) 140   BP (!) 78/0   BP Location Right arm   BP Method Doppler   Patient Position Lying     HR sustaining at 140. Pt lying in bed. Complaining of back pain and dull chest pain. Doppler 78/0. No any LVAD alarms. NUSRAT Beebe on call notified. Stat EKG ordered. MD to order baclofen for pain. Will continue to monitor.    2200 Labs (BMP,Mag,Phos) drawn and sent. Amiodarone bolus given and started gtts per MD's order. Doppler stable.

## 2024-08-27 NOTE — ASSESSMENT & PLAN NOTE
-goal INR 2-2.5 , INR 4.7 on admission  - pharmacy  to manage coumadin dosing, cont to hold warfarin today   Check INR daily

## 2024-08-27 NOTE — CARE UPDATE
Notified by nursing pt HR increased to 140. Reviewed telemetry, HR increase preceded by short run of VT. 12 lead EKG reviewed, suspect AFL with aberrancy vs AT vs sustained VT. Doppler pressure unchanged. No CP or dyspnea. Start amiodarone and recheck lytes.

## 2024-08-27 NOTE — SUBJECTIVE & OBJECTIVE
"Interval HPI:   No acute events overnight. Patient in room 319/319 A. Blood glucose  variable . BG at, above, and below goal on current insulin regimen (SSI, prandial, and basal insulin ). Steroid use- None.    Renal function- Normal   Lab Results   Component Value Date    CREATININE 1.0 2024     Vasopressors-  None     Endocrine will continue to follow and manage insulin orders inpatient.     Diet diabetic Cardiac (Low Na/Chol); 2000 Calorie; Fluid - 1500mL; Standard Tray     Eatin%  Nausea: No  Hypoglycemia and intervention: No  Fever: No  TPN and/or TF: No  If yes, type of TF/TPN and rate: N/A    BP (!) 90/0 (BP Location: Right arm, Patient Position: Lying)   Pulse 108   Temp 99.9 °F (37.7 °C) (Oral)   Resp 20   Ht 6' 3" (1.905 m)   Wt 68.9 kg (151 lb 14.4 oz)   SpO2 99%   BMI 18.99 kg/m²     Labs Reviewed and Include    Recent Labs   Lab 24  0530   *   CALCIUM 8.9   *   K 4.1   CO2 28   CL 92*   BUN 14   CREATININE 1.0     Lab Results   Component Value Date    WBC 14.40 (H) 2024    HGB 8.8 (L) 2024    HCT 29.1 (L) 2024    MCV 64 (L) 2024     2024     Recent Labs   Lab 24  1058   TSH 2.009     Lab Results   Component Value Date    HGBA1C 10.3 (H) 2024       Nutritional status:   Body mass index is 18.99 kg/m².  Lab Results   Component Value Date    ALBUMIN 2.2 (L) 2024    ALBUMIN 2.4 (L) 2024    ALBUMIN 2.6 (L) 2024     Lab Results   Component Value Date    PREALBUMIN 7 (L) 2024    PREALBUMIN 8 (L) 2024    PREALBUMIN 12 (L) 2024       Estimated Creatinine Clearance: 96.7 mL/min (based on SCr of 1 mg/dL).    Accu-Checks  Recent Labs     24  0641 24  0736 24  1649 24  2135 24  0655 24  0656 24  1637 24  2017 24  0140 24  0645   POCTGLUCOSE >500* 464* 386* 244* 431* 357* 204* 182* 258* 241*       Current Medications and/or Treatments " Impacting Glycemic Control  Immunotherapy:    Immunosuppressants       None          Steroids:   Hormones (From admission, onward)      None          Pressors:    Autonomic Drugs (From admission, onward)      None          Hyperglycemia/Diabetes Medications:   Antihyperglycemics (From admission, onward)      Start     Stop Route Frequency Ordered    08/27/24 1130  insulin aspart U-100 pen 20 Units         -- SubQ 3 times daily with meals 08/27/24 0819    08/27/24 0900  insulin glargine U-100 (Lantus) pen 24 Units         -- SubQ 2 times daily 08/27/24 0819    08/25/24 1506  insulin aspart U-100 pen 0-10 Units         -- SubQ Before meals & nightly PRN 08/25/24 1407

## 2024-08-27 NOTE — ASSESSMENT & PLAN NOTE
Updated 8/26/24:  EF 5-10% global hypokinesis, RV enlargement and global hypokinesis, biatrial enlargement, mild-to-moderate MR, severe TR, no van HeartMate 3 in place, aortic valve does not open, LVEDd 7.2  Previous TTE 9/5/23 with EF 10-15%, mod to sever TR, LVEDd 7.11, LVAD - HM3 in place    - No sick contacts or additional symptoms to suggest need for viral respiratory workup  -tolerated Bumex 2 mg well, net negative 3 L, no issues with any allergic reaction, home regimen:  on p.o. Lasix 80 mg daily b.i.d..    -GDMT: c/w spironolactone  -Previously on home Primacor 0.25 at home but ran out approx 5 days prior to admission and also reports he occasionally disconnects himself from his pump to perform certain activities. Hold off on restarting medication.  -Obtain TTE, ordered but still pending   - continues to have significant JVD  -palliative care c/s and pt discussed with service, appreciate recommendations

## 2024-08-27 NOTE — ASSESSMENT & PLAN NOTE
-HeartMate 3 Implanted  6/29/2022  as DT  -HTS Primary  -HOLD Coumadin, Goal INR 2.0-2.5. Supratherapeutic today.   -Antiplatelets ASA 81 mg  -LDH is stable overall today. Will continue to monitor daily.  -Speed set at 5100, LSL 4700 rpm  -Interrogation notable for  power cable disconnected, low voltage advisory, PI events  -Not listed for OHTx- advised pt he would not be candidate for OHTx (noncompliance/poor f/up)   -UDS negative  -attempted to call partner Robyn, unfortunately goes to        Procedure: Device Interrogation Including analysis of device parameters  Current Settings: Ventricular Assist Device  Review of device function is stable/unstable stable        8/27/2024     8:06 AM 8/27/2024     4:22 AM 8/27/2024    12:14 AM 8/26/2024     8:03 PM 8/26/2024     5:17 PM 8/26/2024    12:00 PM 8/26/2024     8:32 AM   TXP LVAD INTERROGATIONS   Type HeartMate3 HeartMate3 HeartMate3 HeartMate3 HeartMate3 HeartMate3 HeartMate3   Flow 3.7 3.7 3.8 4.2 4.6 4.6 4.3   Speed 5100 5100 5100 5100 5100 5100 5100   PI 5.4 5.4 5.8 4.8 4.8 4.7 4.8   Power (Serna) 3.6 3.6 3.6 3.6 3.5 3.7 3.6   LSL 4700 4700 4700 4700      Pulsatility  No Pulse  Intermittent pulse

## 2024-08-27 NOTE — SUBJECTIVE & OBJECTIVE
Interval History:     Past Medical History:   Diagnosis Date    Acute respiratory failure with hypoxia 07/22/2023    Arthritis     Awareness alteration, transient 09/01/2022    Cardiomyopathy     CHF (congestive heart failure) 10/01/2020    COVID-19 06/03/2023    Diabetes mellitus     Dilated cardiomyopathy 10/26/2020    Drug abuse 10/2020    Headache 04/19/2023    Hyperglycemia 12/16/2022    Hyperosmolar hyperglycemic state (HHS) 05/25/2022    ICD (implantable cardioverter-defibrillator) in place 10/26/2020    Infected defibrillator now s/p removal Nov 2023 07/23/2023    Left ventricular assist device (LVAD) complication, initial encounter 06/05/2023    Muscle cramping 06/15/2022    Renal disorder     SOB (shortness of breath) 06/13/2022    Tingling in extremities 07/13/2022       Past Surgical History:   Procedure Laterality Date    APPLICATION OF WOUND VACUUM-ASSISTED CLOSURE DEVICE N/A 6/30/2022    Procedure: APPLICATION, WOUND VAC;  Surgeon: Luis F Paige MD;  Location: [facility] OR [address] [place] [address];  Service: Cardiovascular;  Laterality: N/A;  50 x 5 cm    CARDIAC DEFIBRILLATOR PLACEMENT      ECHOCARDIOGRAM,TRANSESOPHAGEAL  11/9/2023    Procedure: Transesophageal echo (GUILLERMO) intra-procedure log documentation;  Surgeon: Eron Ivy MD;  Location: [facility] EP LAB;  Service: Cardiology;;    EXTRACTION, ELECTRODE LEAD Left 11/9/2023    Procedure: EXTRACTION, ELECTRODE LEAD;  Surgeon: ADALID Leon MD;  Location: [facility] EP LAB;  Service: Cardiology;  Laterality: Left;  INFECTION, LEAD EXTRACTION, GUILLERMO, BSCI, ANES, EH,   *NO CTS BACKUP*    IMPLANTATION OF RIGHT VENTRICULAR ASSIST DEVICE (RVAD) N/A 6/29/2022    Procedure: INSERTION, RVAD;  Surgeon: Luis F Paige MD;  Location: [facility] OR [address] [place] [address];  Service: Cardiovascular;  Laterality: N/A;    INSERTION OF GRAFT TO PERICARDIUM Right 6/30/2022    Procedure: INSERTION-RIGHT VENTRICULAR ASSIST DEVICE;  Surgeon: Luis F Paige MD;  Location: [facility] OR [address] [place] [address];  Service:  Cardiovascular;  Laterality: Right;    IRRIGATION OF MEDIASTINUM  6/30/2022    Procedure: IRRIGATION, MEDIASTINUM;  Surgeon: Luis F Paige MD;  [address];  Service: Cardiovascular;;    LEFT VENTRICULAR ASSIST DEVICE Left 6/23/2022    Procedure: INSERTION-LEFT VENTRICULAR ASSIST DEVICE;  Surgeon: Luis F Paige MD;  [facility];  Service: Cardiovascular;  Laterality: Left;    LEFT VENTRICULAR ASSIST DEVICE N/A 6/29/2022    Procedure: INSERTION-LEFT VENTRICULAR ASSIST DEVICE;  Surgeon: Luis F Paige MD;  [facility];  Service: Cardiovascular;  Laterality: N/A;    REVISION OF SKIN POCKET FOR CARDIOVERTER-DEFIBRILLATOR  11/9/2023    Procedure: REVISION, SKIN POCKET, FOR CARDIOVERTER-DEFIBRILLATOR;  Surgeon: ADALID Leon MD;  Location: [facility] EP LAB;  Service: Cardiology;;    RIGHT HEART CATHETERIZATION Right 4/8/2022    Procedure: INSERTION, CATHETER, RIGHT HEART;  Surgeon: Luca Lopez Jr., MD;  Location: [facility] CATH LAB;  Service: Cardiology;  Laterality: Right;    RIGHT HEART CATHETERIZATION Right 4/19/2022    Procedure: INSERTION, CATHETER, RIGHT HEART;  Surgeon: Josh Pulido MD;  Location: [facility] CATH LAB;  Service: Cardiology;  Laterality: Right;    RIGHT HEART CATHETERIZATION Right 7/21/2022    Procedure: INSERTION, CATHETER, RIGHT HEART;  Surgeon: Dalia Crum MD;  Location: [facility] CATH LAB;  Service: Cardiology;  Laterality: Right;    RIGHT HEART CATHETERIZATION Right 10/31/2022    Procedure: INSERTION, CATHETER, RIGHT HEART;  Surgeon: Dalia Crum MD;  Location: [facility] CATH LAB;  Service: Cardiology;  Laterality: Right;    STERNAL WOUND CLOSURE N/A 6/30/2022    Procedure: CLOSURE, WOUND, STERNUM;  Surgeon: Luis F Paige MD;  [facility];  Service: Cardiovascular;  Laterality: N/A;       Review of patient's allergies indicates:   Allergen Reactions    Bumex [bumetanide] Hives    Torsemide Hives       Medications:  Continuous  Infusions:   amiodarone in dextrose 5%  0.5 mg/min Intravenous Continuous 16.7 mL/hr at 08/27/24 0421 0.5 mg/min at 08/27/24 0421     Scheduled Meds:   amLODIPine  5 mg Oral Daily    aspirin  81 mg Oral Daily    atorvastatin  40 mg Oral Daily    bumetanide  2 mg Oral BID loop    DULoxetine  30 mg Oral Daily    gabapentin  100 mg Oral BID    gabapentin  400 mg Oral QHS    insulin aspart U-100  18 Units Subcutaneous TIDWM    insulin glargine U-100  19 Units Subcutaneous BID    levETIRAcetam  1,500 mg Oral BID    LIDOcaine  2 patch Transdermal Q24H    magnesium oxide  400 mg Oral Daily    nicotine  1 patch Transdermal Daily    pantoprazole  40 mg Oral Daily    spironolactone  25 mg Oral Daily     PRN Meds:  Current Facility-Administered Medications:     acetaminophen, 650 mg, Oral, Q6H PRN    cyclobenzaprine, 5 mg, Oral, Daily PRN    dextrose 10%, 12.5 g, Intravenous, PRN    dextrose 10%, 25 g, Intravenous, PRN    glucagon (human recombinant), 1 mg, Intramuscular, PRN    glucose, 16 g, Oral, PRN    glucose, 24 g, Oral, PRN    insulin aspart U-100, 0-10 Units, Subcutaneous, QID (AC + HS) PRN    ondansetron, 4 mg, Oral, Q8H PRN    Family History       Problem Relation (Age of Onset)    Diverticulosis Brother    Heart attack Maternal Grandmother, Maternal Grandfather    Heart failure Father          Tobacco Use    Smoking status: Former     Current packs/day: 0.00     Average packs/day: 0.5 packs/day for 16.6 years (8.3 ttl pk-yrs)     Types: Cigarettes     Start date: 10/1/2004     Quit date: 4/23/2021     Years since quitting: 3.3    Smokeless tobacco: Never   Substance and Sexual Activity    Alcohol use: Not Currently    Drug use: Not Currently     Types: Marijuana, MDMA (Ecstacy)    Sexual activity: Yes     Partners: Female     Birth control/protection: None       Review of Systems   Constitutional:  Positive for unexpected weight change.   Respiratory:  Positive for shortness of breath.    Cardiovascular:  Negative  for chest pain and leg swelling.   Gastrointestinal:  Negative for nausea and vomiting.   Musculoskeletal:  Positive for back pain.   Psychiatric/Behavioral:  Negative for confusion.      Objective:     Vital Signs (Most Recent):  Temp: 99.9 °F (37.7 °C) (08/27/24 0415)  Pulse: 108 (08/27/24 0630)  Resp: 20 (08/27/24 0415)  BP: (!) 90/0 (08/27/24 0415)  SpO2: 99 % (08/27/24 0415) Vital Signs (24h Range):  Temp:  [98.7 °F (37.1 °C)-99.9 °F (37.7 °C)] 99.9 °F (37.7 °C)  Pulse:  [] 108  Resp:  [18-20] 20  SpO2:  [92 %-99 %] 99 %  BP: (78-90)/(0) 90/0     Weight: 68.9 kg (151 lb 14.4 oz)  Body mass index is 18.99 kg/m².       Physical Exam  Vitals and nursing note reviewed.   Constitutional:       Appearance: He is ill-appearing.   Cardiovascular:      Rate and Rhythm: Tachycardia present.   Pulmonary:      Effort: Pulmonary effort is normal.   Abdominal:      General: There is no distension.      Tenderness: There is no abdominal tenderness.   Musculoskeletal:      Cervical back: Normal range of motion.   Skin:     General: Skin is warm and dry.   Neurological:      Mental Status: He is alert and oriented to person, place, and time.            Review of Symptoms      Symptom Assessment (ESAS 0-10 Scale)  Pain:  6  Dyspnea:  0  Anxiety:  0  Nausea:  0  Depression:  0  Anorexia:  0  Fatigue:  0  Insomnia:  0  Restlessness:  0  Agitation:  0     CAM / Delirium:  Negative  Constipation:  Negative  Diarrhea:  Negative      Bowel Management Plan (BMP):  No      Pain Assessment:  OME in 24 hours:  0  Location(s): chest    Chest       Location: anterior and bilateral        Quality: Dull and cramping        Quantity: 6/10 in intensity        Chronicity: Onset 3 week(s) ago, gradually worsening        Aggravating Factors: Walking        Alleviating Factors: Acetaminophen        Associated Symptoms: None    Performance Status:  70    Living Arrangements:  Lives with family    Psychosocial/Cultural:   See Palliative  Psychosocial Note: Yes  **Primary  to Follow**  Palliative Care  Consult: No    Spiritual:  F - Margie and Belief:  Not  discussed   A - Address in Care:  Not addressed at this time         Advance Care Planning   Advance Directives:   Living Will: No    LaPOST: No    Medical Power of : Yes    Agent's Name:  Niharika Wright   Agent's Contact Number:  [phone]  Goals of Care: What is most important right now is to focus on symptom/pain control, extending life as long as possible, even it it means sacrificing quality. Accordingly, we have decided that the best plan to meet the patient's goals includes continuing with treatment.         Significant Labs: All pertinent labs within the past 24 hours have been reviewed.  CBC:   Recent Labs   Lab 08/27/24  0530   WBC 14.40*   HGB 8.8*   HCT 29.1*   MCV 64*        BMP:  Recent Labs   Lab 08/27/24  0530   *   *   K 4.1   CL 92*   CO2 28   BUN 14   CREATININE 1.0   CALCIUM 8.9   MG 2.0     LFT:  Lab Results   Component Value Date    AST 23 08/26/2024    ALKPHOS 199 (H) 08/26/2024    BILITOT 2.2 (H) 08/26/2024     Albumin:   Albumin   Date Value Ref Range Status   08/26/2024 2.2 (L) 3.5 - 5.2 g/dL Final     Protein:   Total Protein   Date Value Ref Range Status   08/26/2024 7.4 6.0 - 8.4 g/dL Final     Lactic acid:   Lab Results   Component Value Date    LACTATE 1.5 01/23/2024    LACTATE 3.6 (HH) 01/23/2024       Significant Imaging: I have reviewed all pertinent imaging results/findings within the past 24 hours.  US of soft tissue chest 8/26/24  FINDINGS:  4.2 x 1.7 x 3.4 cm hypoechoic area at concern in the right chest/retroareolar region with peripheral vascularity and compressibility.  Nonspecific, may represent gynecomastia when compared to recent CT.  Difficult to exclude complex collection such as hematoma or abscess.  Recommend clinical correlation.

## 2024-08-27 NOTE — PLAN OF CARE
Amiodrone gtt  0.5mg/min. Plan of care reviewed with patient. Patient is AOX4. Patient remained free of falls and trauma, Patient is ambulating independently. Patient has no questions at this time. Wheels are locked and the bed is in lowest position. The call bell is within reach. LVAD dressing change completed.Telemetry is on. Will continue to follow care.     Problem: Adult Inpatient Plan of Care  Goal: Plan of Care Review  Outcome: Progressing  Goal: Patient-Specific Goal (Individualized)  Outcome: Progressing  Goal: Absence of Hospital-Acquired Illness or Injury  Outcome: Progressing

## 2024-08-27 NOTE — PROGRESS NOTES
08/27/2024  John Alaniz    Current provider:  Dalia Crum MD    Device interrogation:      8/27/2024     4:22 AM 8/27/2024    12:14 AM 8/26/2024     8:03 PM 8/26/2024     5:17 PM 8/26/2024    12:00 PM 8/26/2024     8:32 AM 8/26/2024     5:59 AM   TXP LVAD INTERROGATIONS   Type HeartMate3 HeartMate3 HeartMate3 HeartMate3 HeartMate3 HeartMate3 HeartMate3   Flow 3.7 3.8 4.2 4.6 4.6 4.3 3.8   Speed 5100 5100 5100 5100 5100 5100 5100   PI 5.4 5.8 4.8 4.8 4.7 4.8 6.1   Power (Serna) 3.6 3.6 3.6 3.5 3.7 3.6 3.5   LSL 4700 4700 4700    4700   Pulsatility No Pulse  Intermittent pulse    Intermittent pulse          Rounded on Kevan Queen to ensure all mechanical assist device settings (IABP or VAD) were appropriate and all parameters were within limits.  I was able to ensure all back up equipment was present, the staff had no issues, and the Perfusion Department daily rounding was complete.      For implantable VADs: Interrogation of Ventricular assist device was performed with analysis of device parameters and review of device function. I have personally reviewed the interrogation findings and agree with findings as stated.     In emergency, the nursing units have been notified to contact the perfusion department either by:  Calling [phone] from 630am to 4pm Mon thru Fri, utilizing the On-Call Finder functionality of Epic and searching for Perfusion, or by contacting the hospital  from 4pm to 630am and on weekends and asking to speak with the perfusionist on call.    7:02 AM

## 2024-08-27 NOTE — PLAN OF CARE
Problem: Adult Inpatient Plan of Care  Goal: Plan of Care Review  Outcome: Progressing  Goal: Patient-Specific Goal (Individualized)  Outcome: Progressing  Goal: Absence of Hospital-Acquired Illness or Injury  Outcome: Progressing  Goal: Optimal Comfort and Wellbeing  Outcome: Progressing  Goal: Readiness for Transition of Care  Outcome: Progressing     Problem: Diabetes Comorbidity  Goal: Blood Glucose Level Within Targeted Range  Outcome: Progressing     Problem: Infection  Goal: Absence of Infection Signs and Symptoms  Outcome: Progressing     Problem: Ventricular Assist Device  Goal: Optimal Adjustment to Device  Outcome: Progressing  Goal: Absence of Bleeding  Outcome: Progressing  Goal: Absence of Embolism Signs and Symptoms  Outcome: Progressing  Goal: Optimal Blood Flow  Outcome: Progressing  Goal: Absence of Infection Signs and Symptoms  Outcome: Progressing  Goal: Effective Right-Sided Heart Function  Outcome: Progressing     Problem: Ventricular Assist Device  Goal: Optimal Adjustment to Device  Outcome: Progressing  Goal: Absence of Bleeding  Outcome: Progressing  Goal: Absence of Embolism Signs and Symptoms  Outcome: Progressing  Goal: Optimal Blood Flow  Outcome: Progressing  Goal: Absence of Infection Signs and Symptoms  Outcome: Progressing  Goal: Effective Right-Sided Heart Function  Outcome: Progressing     Problem: Coping Ineffective  Goal: Effective Coping  Outcome: Progressing

## 2024-08-27 NOTE — ASSESSMENT & PLAN NOTE
Jerome nunez consulted for goals of care for  a 40 yo gentleman post VAD placement .  Returns to hospital with c/o pain after running out of primacor at home. He did not notify VAD team of difficulties. Patient is awake alert and oriented. At time of this encounter Mr. Queen is in bed with lights out in the room.  Converses freely with short answers.  Indicates he is comfortable at this time.      Advance Care Planning     Date: 08/28/2024    Power of   I initiated the process of voluntary advance care planning today and explained the importance of this process to the patient.  I introduced the concept of advance directives to the patient, as well. Then the patient received detailed information about the importance of designating a Health Care Power of  (HCPOA). He was also instructed to communicate with this person about their wishes for future healthcare, should he become sick and lose decision-making capacity. The patient has previously appointed a HCPOA. After our discussion, the patient has not decided to complete a HCPOA and has appointed his significant other, health care agent: Niharika Wright  & health care agent number:  [phone] . I encouraged him to communicate with this person about their wishes for future healthcare, should he become sick and lose decision-making capacity.      A total of 20  min was spent on advance care planning, goals of care discussion, emotional support, formulating and communicating prognosis and exploring burden/benefit of various approaches of treatment. This discussion occurred on a fully voluntary basis with the verbal consent of the patient and/or family.    Advance Care Planning     Date: 08/28/2024    [facility]  I engaged the patient in a voluntary conversation about advance care planning and we specifically addressed what the goals of care would be moving forward, in light of the patient's change in clinical status, specifically non adherence with plan of  care and continuing medications as ordered. .  We did not specifically address the patient's likely prognosis. We did discuss the importance of adherence with medications and return to clinic as directed.  Explained follow up in out patient pal med clinic is a good resource for continued conversations and clarification of information.  we explored the patient's values and preferences for future care.  The patient endorses that what is most important right now is to focus on maintaining the VAD and notifying VAD clinic  as soon as possible regarding any changes or sudden stops in medications in medications.    - Patient reports his wife, significant other is primary care giver.    - Ultimately Mr. Queen's goal is to have a healthier and longer live with his family and children.   -Patient states understanding adherence to  plan of care is most important.      Accordingly, we have decided that the best plan to meet the patient's goals includes continuing with treatment    A total of 20  min was spent on advance care planning, goals of care discussion, emotional support, formulating and communicating prognosis and exploring burden/benefit of various approaches of treatment. This discussion occurred on a fully voluntary basis with the verbal consent of the patient and/or family.           Symptom Management   Pain  - Pt reports initial pain following running out of primacor. Does have intermittent chest pain/pressure with straining on the toilet as well but no radiation of pain or issues with nausea/vomiting/diaphoresis/sob with symptoms. S/p spinal Xrays without significant abnormality.   - reports intermittent dull achy pain in lower back - unchanged from time of admit   - pain does not radiate   - patient associates pain with discontinuation of primacor   Recommendations continue current medical management - prn gabapentin, acetaminophen, cyclobenzaprine   Lidocaine     Dyspnea   - mild dyspnea on exertion   - no  supplemental oxygen  in use  - room air oxygen saturation is 94-98%      LVAD (left ventricular assist device) present  -HeartMate 3 Implanted  6/29/2022  as DT  -HTS Primary  -iInterrogation notable for  power cable disconnected, low voltage advisory, PI events  -Not listed for OHTx- advised pt he would not be candidate for OHTx (noncompliance/poor f/up)   -UDS negative  - primary care giver niharika has been unavailable to telephon      Recommendations   - continue current plan of care - goal is to maintain LVAD for prolonged life expectancy and time with his children   - Niharika Wright is primary care giver. She has been unavailable at the hospital.   - Patient and primary care giver would benefit from continued education and information regarding plan of care, prognosis is adherence is not maintained and what to expect in the furture.  - may benefit from f/u with Dr. Carr in outpatient pal med/heart failure clinic. Virtual appointment is available if transportation difficulties continue.      Dr. Crum's primary team resident has been notified of the above via secure chat message.    Thank you for consult and opportunity to participate in Mr. Queen's care.

## 2024-08-27 NOTE — ASSESSMENT & PLAN NOTE
I independently reviewed patient's lab work and images as documented. 40 yo male with LVAD HM3 as DT, prior MSSA DLESI/bacteremia c/b empyema (on suppressive cefadroxil) and eroded ICD/pocket infection s/p removal, seizure admitted for back pain. Hospital course notable for US of chest with hypoechoic area of unclear significance. DLES cx in process. CT in process.     Recommendations:  -reordered cefadroxil 500 mg bid (suppressive therapy)  -follow up CT  -follow up cx

## 2024-08-27 NOTE — ASSESSMENT & PLAN NOTE
BG goal 140-180  Noncompliant T2DM.     - Increase Lantus (Insulin Glargine) to 24 units BID (25% increase due to elevated fasting blood glucose)  - Increase Novolog (Insulin Aspart) to 20 units TIDWM (10% increase)  - Continue moderate dose correction (150/25)  - BG checks AC/HS  - Hypoglycemia protocol in place    ** Please notify Endocrine for any change and/or advance in diet**  ** Please call Endocrine for any BG related issues **    Discharge Planning:   TBD. Please notify endocrinology prior to discharge.  Will likely resume home regimen.  Patient requires glucometer and testing supplies.  Recommend he resumes his William for blood sugar monitoring.

## 2024-08-27 NOTE — SUBJECTIVE & OBJECTIVE
Interval History: Pt doing well overnight, denies any chest pain, shortness of breath, lightheadedness, dizziness, fevers or chills overnight.  Overnight, he did have an episode of V-tach versus AFib that was worried with amiodarone bolus, however patient reports that his only symptom was palpitations without any nausea, diaphoresis, lightheadedness or dizziness.  Patient had normal sinus rhythm following amiodarone bolus.  Blood pressures are stable.    Responding well to 2 mg of Bumex for diuresis, no issue any allergic reaction.  Will remove from allergy list.  He continues to have significant JVD on exam.  Abdominal distention slightly reduced.  Abdomen soft.  Discuss goals of care again inpatient noncompliance.  Patient willing to discuss goals brother with palliative Care, expect they will be seeing him today.    Echo completed yesterday, largely unchanged from previous echo, LVEF of 5-10% with global hypokinesis, HeartMate 3 in place, AV opening.  Severe RV enlargement and hypokinesis, biatrial enlargement, PA systolic measured at 33 mmhg, IVC 15 mmhg.    Ultrasound of soft tissue chest completed yesterday, evidence of collection in right breast that could be hematoma versus abscess versus mass/gynecomastia.  Discussed need for CT scan for further evaluation patient.  Patient did not have any infectious symptoms and has no tenderness at the site.      Continuous Infusions:   amiodarone in dextrose 5%  0.5 mg/min Intravenous Continuous 16.7 mL/hr at 08/27/24 0421 0.5 mg/min at 08/27/24 0421     Scheduled Meds:   amLODIPine  5 mg Oral Daily    aspirin  81 mg Oral Daily    atorvastatin  40 mg Oral Daily    bumetanide  2 mg Oral BID loop    DULoxetine  30 mg Oral Daily    gabapentin  100 mg Oral BID    gabapentin  400 mg Oral QHS    insulin aspart U-100  20 Units Subcutaneous TIDWM    insulin glargine U-100  24 Units Subcutaneous BID    levETIRAcetam  1,500 mg Oral BID    LIDOcaine  2 patch Transdermal Q24H     magnesium oxide  400 mg Oral Daily    nicotine  1 patch Transdermal Daily    pantoprazole  40 mg Oral Daily    spironolactone  25 mg Oral Daily     PRN Meds:  Current Facility-Administered Medications:     acetaminophen, 650 mg, Oral, Q6H PRN    cyclobenzaprine, 5 mg, Oral, Daily PRN    dextrose 10%, 12.5 g, Intravenous, PRN    dextrose 10%, 25 g, Intravenous, PRN    glucagon (human recombinant), 1 mg, Intramuscular, PRN    glucose, 16 g, Oral, PRN    glucose, 24 g, Oral, PRN    insulin aspart U-100, 0-10 Units, Subcutaneous, QID (AC + HS) PRN    ondansetron, 4 mg, Oral, Q8H PRN    Review of patient's allergies indicates:   Allergen Reactions    Bumex [bumetanide] Hives    Torsemide Hives     Objective:     Vital Signs (Most Recent):  Temp: 98.3 °F (36.8 °C) (08/27/24 0800)  Pulse: 100 (08/27/24 1000)  Resp: 20 (08/27/24 0415)  BP: (!) 90/0 (08/27/24 0800)  SpO2: (!) 92 % (08/27/24 0800) Vital Signs (24h Range):  Temp:  [98.3 °F (36.8 °C)-99.9 °F (37.7 °C)] 98.3 °F (36.8 °C)  Pulse:  [] 100  Resp:  [18-20] 20  SpO2:  [92 %-99 %] 92 %  BP: (78-90)/(0) 90/0     Patient Vitals for the past 72 hrs (Last 3 readings):   Weight   08/27/24 0415 68.9 kg (151 lb 14.4 oz)   08/26/24 1516 70.8 kg (156 lb 1.4 oz)   08/26/24 1109 70.8 kg (156 lb 1.4 oz)     Body mass index is 18.99 kg/m².      Intake/Output Summary (Last 24 hours) at 8/27/2024 1042  Last data filed at 8/27/2024 0446  Gross per 24 hour   Intake 1000 ml   Output 3775 ml   Net -2775 ml       Hemodynamic Parameters:  CVP:  [18 mmHg] 18 mmHg    EKG- without ischemic changes this AM        Physical Exam  Constitutional:       General: He is not in acute distress.     Appearance: He is normal weight. He is ill-appearing.   HENT:      Head: Normocephalic and atraumatic.      Right Ear: External ear normal.      Left Ear: External ear normal.      Nose: Nose normal.      Mouth/Throat:      Pharynx: Oropharynx is clear.   Neck:      Comments: Prominent JVD present on  exam to angle of jaw   Cardiovascular:      Comments: Normal VAD hum, significant JVD  Pulmonary:      Effort: Pulmonary effort is normal.   Abdominal:      General: Abdomen is flat. Bowel sounds are normal. There is distension (Distended but soft.).      Palpations: Abdomen is soft. There is no mass.      Tenderness: There is no abdominal tenderness.   Musculoskeletal:      Cervical back: Normal range of motion.      Right lower leg: No edema.      Left lower leg: No edema.   Skin:     General: Skin is warm.      Comments: Chest wall on the right side with gynecomastia, no overlying skin changes, no tenderness at the site however palpable mass versus nodule present.   Neurological:      Mental Status: He is alert. Mental status is at baseline.            Significant Labs:  CBC:  Recent Labs   Lab 08/25/24  1150 08/26/24  0508 08/27/24  0530   WBC 9.96 10.44 14.40*   RBC 4.41* 4.38* 4.55*   HGB 8.5* 8.2* 8.8*   HCT 29.6* 29.1* 29.1*    235 309   MCV 67* 66* 64*   MCH 19.3* 18.7* 19.3*   MCHC 28.7* 28.2* 30.2*     BNP:  Recent Labs   Lab 08/25/24  1150 08/26/24  0508   * 461*     CMP:  Recent Labs   Lab 08/25/24  0215 08/25/24  1150 08/25/24  2029 08/26/24  0508 08/26/24  2221 08/27/24  0530   GLU  --  295*   < > 374* 122* 161*   CALCIUM 9.0 8.8   < > 8.8 8.9 8.9   ALBUMIN 2.6* 2.4*  --  2.2*  --   --    PROT  --  7.9  --  7.4  --   --    * 133*   < > 130* 133* 130*   K 3.3* 2.7*   < > 4.0 3.3* 4.1   CO2 27 31*   < > 29 26 28   CL 87* 92*   < > 94* 94* 92*   BUN 12.7 11   < > 13 13 14   CREATININE 1.33* 1.0   < > 1.0 1.0 1.0   ALKPHOS 208* 207*  --  199*  --   --    ALT 17 14  --  13  --   --    AST 29 24  --  23  --   --    BILITOT 2.6* 2.3*  --  2.2*  --   --     < > = values in this interval not displayed.      Coagulation:   Recent Labs   Lab 08/25/24  1150 08/26/24  0508 08/27/24  0530   INR 4.7* 4.3* 2.7*   APTT 49.5* 83.1* 51.1*     LDH:  Recent Labs   Lab 08/25/24  1150 08/26/24  0508  08/27/24  0530   * 274* 310*     Microbiology:  Microbiology Results (last 7 days)       Procedure Component Value Units Date/Time    Culture, Anaerobe [[id number]]     Order Status: No result Specimen: Skin from Abdomen     Aerobic culture [[id number]]     Order Status: No result Specimen: Skin from Abdomen     Blood culture [[id number]]     Order Status: No result Specimen: Blood     Blood culture [[id number]]     Order Status: No result Specimen: Blood     Culture, Anaerobe [[id number]] Collected: 08/25/24 1733    Order Status: Completed Specimen: Wound from Abdomen Updated: 08/27/24 0703     Anaerobic Culture Culture in progress    Narrative:      Drive line exit site    Aerobic culture [[id number]] Collected: 08/25/24 1733    Order Status: Sent Specimen: Wound from Abdomen Updated: 08/25/24 1734            I have reviewed all pertinent labs within the past 24 hours.    Estimated Creatinine Clearance: 96.7 mL/min (based on SCr of 1 mg/dL).    Diagnostic Results:  I have reviewed and interpreted all pertinent imaging results/findings within the past 24 hours.

## 2024-08-27 NOTE — ASSESSMENT & PLAN NOTE
Pt reports initial pain following running out of primacor. Does have intermittent chest pain/pressure with straining on the toilet as well but no radiation of pain or issues with nausea/vomiting/diaphoresis/sob with symptoms. S/p spinal Xrays without significant abnormality.   NO red flag symptoms on admission  C/w home gabapentin, added tylenol prn and lidocaine patches   -CT T/A/P pending, will get Spine CT read eval if appropriate

## 2024-08-28 ENCOUNTER — TELEPHONE (OUTPATIENT)
Dept: TRANSPLANT | Facility: CLINIC | Age: 39
End: 2024-08-28
Payer: MEDICAID

## 2024-08-28 PROBLEM — R52 PAIN: Status: ACTIVE | Noted: 2024-08-28

## 2024-08-28 PROBLEM — R06.00 DYSPNEA: Status: ACTIVE | Noted: 2024-08-28

## 2024-08-28 PROBLEM — Z71.89 ADVANCED CARE PLANNING/COUNSELING DISCUSSION: Status: ACTIVE | Noted: 2024-08-28

## 2024-08-28 LAB
ALBUMIN SERPL BCP-MCNC: 2.4 G/DL (ref 3.5–5.2)
ALLENS TEST: ABNORMAL
ALP SERPL-CCNC: 215 U/L (ref 55–135)
ALT SERPL W/O P-5'-P-CCNC: 13 U/L (ref 10–44)
ANION GAP SERPL CALC-SCNC: 13 MMOL/L (ref 8–16)
APTT PPP: 43.1 SEC (ref 21–32)
AST SERPL-CCNC: 24 U/L (ref 10–40)
BACTERIA #/AREA URNS AUTO: ABNORMAL /HPF
BASOPHILS # BLD AUTO: 0.05 K/UL (ref 0–0.2)
BASOPHILS NFR BLD: 0.4 % (ref 0–1.9)
BILIRUB DIRECT SERPL-MCNC: 1.8 MG/DL (ref 0.1–0.3)
BILIRUB SERPL-MCNC: 2.4 MG/DL (ref 0.1–1)
BILIRUB UR QL STRIP: NEGATIVE
BNP SERPL-MCNC: 376 PG/ML (ref 0–99)
BUN SERPL-MCNC: 19 MG/DL (ref 6–20)
CALCIUM SERPL-MCNC: 9 MG/DL (ref 8.7–10.5)
CHLORIDE SERPL-SCNC: 92 MMOL/L (ref 95–110)
CLARITY UR REFRACT.AUTO: CLEAR
CO2 SERPL-SCNC: 26 MMOL/L (ref 23–29)
COLOR UR AUTO: YELLOW
CREAT SERPL-MCNC: 1.1 MG/DL (ref 0.5–1.4)
CRP SERPL-MCNC: 87.9 MG/L (ref 0–8.2)
DELSYS: ABNORMAL
DIFFERENTIAL METHOD BLD: ABNORMAL
EOSINOPHIL # BLD AUTO: 0.1 K/UL (ref 0–0.5)
EOSINOPHIL NFR BLD: 0.6 % (ref 0–8)
ERYTHROCYTE [DISTWIDTH] IN BLOOD BY AUTOMATED COUNT: 28.3 % (ref 11.5–14.5)
EST. GFR  (NO RACE VARIABLE): >60 ML/MIN/1.73 M^2
GLUCOSE SERPL-MCNC: 219 MG/DL (ref 70–110)
GLUCOSE UR QL STRIP: NEGATIVE
HCO3 UR-SCNC: 29.7 MMOL/L (ref 24–28)
HCT VFR BLD AUTO: 27.2 % (ref 40–54)
HGB BLD-MCNC: 8 G/DL (ref 14–18)
HGB UR QL STRIP: ABNORMAL
HYALINE CASTS UR QL AUTO: 0 /LPF
IMM GRANULOCYTES # BLD AUTO: 0.05 K/UL (ref 0–0.04)
IMM GRANULOCYTES NFR BLD AUTO: 0.4 % (ref 0–0.5)
INR PPP: 2.3 (ref 0.8–1.2)
KETONES UR QL STRIP: NEGATIVE
LDH SERPL L TO P-CCNC: 326 U/L (ref 110–260)
LEUKOCYTE ESTERASE UR QL STRIP: NEGATIVE
LYMPHOCYTES # BLD AUTO: 1.7 K/UL (ref 1–4.8)
LYMPHOCYTES NFR BLD: 14.9 % (ref 18–48)
MAGNESIUM SERPL-MCNC: 1.8 MG/DL (ref 1.6–2.6)
MCH RBC QN AUTO: 19 PG (ref 27–31)
MCHC RBC AUTO-ENTMCNC: 29.4 G/DL (ref 32–36)
MCV RBC AUTO: 65 FL (ref 82–98)
MICROSCOPIC COMMENT: ABNORMAL
MODE: ABNORMAL
MONOCYTES # BLD AUTO: 1.1 K/UL (ref 0.3–1)
MONOCYTES NFR BLD: 9.7 % (ref 4–15)
NEUTROPHILS # BLD AUTO: 8.6 K/UL (ref 1.8–7.7)
NEUTROPHILS NFR BLD: 74 % (ref 38–73)
NITRITE UR QL STRIP: NEGATIVE
NRBC BLD-RTO: 0 /100 WBC
PCO2 BLDA: 48.8 MMHG (ref 35–45)
PH SMN: 7.39 [PH] (ref 7.35–7.45)
PH UR STRIP: 7 [PH] (ref 5–8)
PHOSPHATE SERPL-MCNC: 3.8 MG/DL (ref 2.7–4.5)
PLATELET # BLD AUTO: 309 K/UL (ref 150–450)
PMV BLD AUTO: ABNORMAL FL (ref 9.2–12.9)
PO2 BLDA: 26 MMHG (ref 40–60)
POC BE: 5 MMOL/L
POC SATURATED O2: 46 % (ref 95–100)
POC TCO2: 31 MMOL/L (ref 24–29)
POCT GLUCOSE: 129 MG/DL (ref 70–110)
POCT GLUCOSE: 279 MG/DL (ref 70–110)
POCT GLUCOSE: 432 MG/DL (ref 70–110)
POCT GLUCOSE: 70 MG/DL (ref 70–110)
POTASSIUM SERPL-SCNC: 3.6 MMOL/L (ref 3.5–5.1)
PREALB SERPL-MCNC: 8 MG/DL (ref 20–43)
PROT SERPL-MCNC: 7.6 G/DL (ref 6–8.4)
PROT UR QL STRIP: ABNORMAL
PROTHROMBIN TIME: 23.9 SEC (ref 9–12.5)
RBC # BLD AUTO: 4.21 M/UL (ref 4.6–6.2)
RBC #/AREA URNS AUTO: 16 /HPF (ref 0–4)
SAMPLE: ABNORMAL
SITE: ABNORMAL
SODIUM SERPL-SCNC: 131 MMOL/L (ref 136–145)
SP GR UR STRIP: 1.03 (ref 1–1.03)
URN SPEC COLLECT METH UR: ABNORMAL
WBC # BLD AUTO: 11.66 K/UL (ref 3.9–12.7)
WBC #/AREA URNS AUTO: 1 /HPF (ref 0–5)

## 2024-08-28 PROCEDURE — 25500020 PHARM REV CODE 255: Performed by: INTERNAL MEDICINE

## 2024-08-28 PROCEDURE — 99233 SBSQ HOSP IP/OBS HIGH 50: CPT | Mod: ,,, | Performed by: INTERNAL MEDICINE

## 2024-08-28 PROCEDURE — 99223 1ST HOSP IP/OBS HIGH 75: CPT | Mod: ,,, | Performed by: CLINICAL NURSE SPECIALIST

## 2024-08-28 PROCEDURE — 86140 C-REACTIVE PROTEIN: CPT | Performed by: STUDENT IN AN ORGANIZED HEALTH CARE EDUCATION/TRAINING PROGRAM

## 2024-08-28 PROCEDURE — 20600001 HC STEP DOWN PRIVATE ROOM

## 2024-08-28 PROCEDURE — 63600175 PHARM REV CODE 636 W HCPCS: Performed by: STUDENT IN AN ORGANIZED HEALTH CARE EDUCATION/TRAINING PROGRAM

## 2024-08-28 PROCEDURE — 83880 ASSAY OF NATRIURETIC PEPTIDE: CPT | Performed by: STUDENT IN AN ORGANIZED HEALTH CARE EDUCATION/TRAINING PROGRAM

## 2024-08-28 PROCEDURE — 85025 COMPLETE CBC W/AUTO DIFF WBC: CPT | Performed by: STUDENT IN AN ORGANIZED HEALTH CARE EDUCATION/TRAINING PROGRAM

## 2024-08-28 PROCEDURE — 83615 LACTATE (LD) (LDH) ENZYME: CPT | Performed by: STUDENT IN AN ORGANIZED HEALTH CARE EDUCATION/TRAINING PROGRAM

## 2024-08-28 PROCEDURE — 84134 ASSAY OF PREALBUMIN: CPT | Performed by: STUDENT IN AN ORGANIZED HEALTH CARE EDUCATION/TRAINING PROGRAM

## 2024-08-28 PROCEDURE — 25000003 PHARM REV CODE 250: Performed by: INTERNAL MEDICINE

## 2024-08-28 PROCEDURE — 81001 URINALYSIS AUTO W/SCOPE: CPT

## 2024-08-28 PROCEDURE — 25000003 PHARM REV CODE 250: Performed by: STUDENT IN AN ORGANIZED HEALTH CARE EDUCATION/TRAINING PROGRAM

## 2024-08-28 PROCEDURE — 85610 PROTHROMBIN TIME: CPT | Performed by: STUDENT IN AN ORGANIZED HEALTH CARE EDUCATION/TRAINING PROGRAM

## 2024-08-28 PROCEDURE — 99232 SBSQ HOSP IP/OBS MODERATE 35: CPT | Mod: ,,, | Performed by: STUDENT IN AN ORGANIZED HEALTH CARE EDUCATION/TRAINING PROGRAM

## 2024-08-28 PROCEDURE — 80076 HEPATIC FUNCTION PANEL: CPT | Performed by: STUDENT IN AN ORGANIZED HEALTH CARE EDUCATION/TRAINING PROGRAM

## 2024-08-28 PROCEDURE — 80048 BASIC METABOLIC PNL TOTAL CA: CPT | Performed by: STUDENT IN AN ORGANIZED HEALTH CARE EDUCATION/TRAINING PROGRAM

## 2024-08-28 PROCEDURE — 87040 BLOOD CULTURE FOR BACTERIA: CPT | Mod: 59

## 2024-08-28 PROCEDURE — 27000248 HC VAD-ADDITIONAL DAY

## 2024-08-28 PROCEDURE — 99232 SBSQ HOSP IP/OBS MODERATE 35: CPT | Mod: ,,,

## 2024-08-28 PROCEDURE — 63600175 PHARM REV CODE 636 W HCPCS

## 2024-08-28 PROCEDURE — 93750 INTERROGATION VAD IN PERSON: CPT | Mod: ,,, | Performed by: INTERNAL MEDICINE

## 2024-08-28 PROCEDURE — 87077 CULTURE AEROBIC IDENTIFY: CPT | Mod: 59

## 2024-08-28 PROCEDURE — 84100 ASSAY OF PHOSPHORUS: CPT | Performed by: STUDENT IN AN ORGANIZED HEALTH CARE EDUCATION/TRAINING PROGRAM

## 2024-08-28 PROCEDURE — S4991 NICOTINE PATCH NONLEGEND: HCPCS | Performed by: STUDENT IN AN ORGANIZED HEALTH CARE EDUCATION/TRAINING PROGRAM

## 2024-08-28 PROCEDURE — 85730 THROMBOPLASTIN TIME PARTIAL: CPT | Performed by: STUDENT IN AN ORGANIZED HEALTH CARE EDUCATION/TRAINING PROGRAM

## 2024-08-28 PROCEDURE — 82803 BLOOD GASES ANY COMBINATION: CPT

## 2024-08-28 PROCEDURE — 25000003 PHARM REV CODE 250

## 2024-08-28 PROCEDURE — 94761 N-INVAS EAR/PLS OXIMETRY MLT: CPT | Mod: XB

## 2024-08-28 PROCEDURE — C1751 CATH, INF, PER/CENT/MIDLINE: HCPCS

## 2024-08-28 PROCEDURE — 87154 CUL TYP ID BLD PTHGN 6+ TRGT: CPT

## 2024-08-28 PROCEDURE — 83735 ASSAY OF MAGNESIUM: CPT | Performed by: STUDENT IN AN ORGANIZED HEALTH CARE EDUCATION/TRAINING PROGRAM

## 2024-08-28 PROCEDURE — 99497 ADVNCD CARE PLAN 30 MIN: CPT | Mod: 25,,, | Performed by: CLINICAL NURSE SPECIALIST

## 2024-08-28 PROCEDURE — 99900035 HC TECH TIME PER 15 MIN (STAT)

## 2024-08-28 RX ORDER — AMIODARONE HYDROCHLORIDE 200 MG/1
400 TABLET ORAL 2 TIMES DAILY
Status: COMPLETED | OUTPATIENT
Start: 2024-08-28 | End: 2024-09-08

## 2024-08-28 RX ORDER — WARFARIN 2 MG/1
2 TABLET ORAL DAILY
Status: DISCONTINUED | OUTPATIENT
Start: 2024-08-28 | End: 2024-08-30

## 2024-08-28 RX ORDER — INSULIN GLARGINE 100 [IU]/ML
30 INJECTION, SOLUTION SUBCUTANEOUS 2 TIMES DAILY
Status: DISCONTINUED | OUTPATIENT
Start: 2024-08-28 | End: 2024-08-31

## 2024-08-28 RX ORDER — POTASSIUM CHLORIDE 20 MEQ/1
40 TABLET, EXTENDED RELEASE ORAL ONCE
Status: COMPLETED | OUTPATIENT
Start: 2024-08-28 | End: 2024-08-28

## 2024-08-28 RX ORDER — AMIODARONE HYDROCHLORIDE 200 MG/1
200 TABLET ORAL DAILY
Status: DISCONTINUED | OUTPATIENT
Start: 2024-09-09 | End: 2024-09-10 | Stop reason: HOSPADM

## 2024-08-28 RX ORDER — EPLERENONE 25 MG/1
25 TABLET, FILM COATED ORAL DAILY
Status: DISCONTINUED | OUTPATIENT
Start: 2024-08-28 | End: 2024-09-10 | Stop reason: HOSPADM

## 2024-08-28 RX ADMIN — AMIODARONE HYDROCHLORIDE 400 MG: 200 TABLET ORAL at 11:08

## 2024-08-28 RX ADMIN — IOHEXOL 75 ML: 350 INJECTION, SOLUTION INTRAVENOUS at 06:08

## 2024-08-28 RX ADMIN — EPLERENONE 25 MG: 25 TABLET, FILM COATED ORAL at 11:08

## 2024-08-28 RX ADMIN — BUMETANIDE 2 MG: 1 TABLET ORAL at 04:08

## 2024-08-28 RX ADMIN — INSULIN ASPART 10 UNITS: 100 INJECTION, SOLUTION INTRAVENOUS; SUBCUTANEOUS at 11:08

## 2024-08-28 RX ADMIN — GABAPENTIN 100 MG: 100 CAPSULE ORAL at 11:08

## 2024-08-28 RX ADMIN — NICOTINE 1 PATCH: 14 PATCH, EXTENDED RELEASE TRANSDERMAL at 08:08

## 2024-08-28 RX ADMIN — ACETAMINOPHEN 650 MG: 325 TABLET ORAL at 10:08

## 2024-08-28 RX ADMIN — PANTOPRAZOLE SODIUM 40 MG: 40 TABLET, DELAYED RELEASE ORAL at 08:08

## 2024-08-28 RX ADMIN — LEVETIRACETAM 1500 MG: 500 TABLET, FILM COATED ORAL at 08:08

## 2024-08-28 RX ADMIN — CEFADROXIL 500 MG: 500 CAPSULE ORAL at 08:08

## 2024-08-28 RX ADMIN — ATORVASTATIN CALCIUM 40 MG: 20 TABLET, FILM COATED ORAL at 08:08

## 2024-08-28 RX ADMIN — INSULIN ASPART 6 UNITS: 100 INJECTION, SOLUTION INTRAVENOUS; SUBCUTANEOUS at 08:08

## 2024-08-28 RX ADMIN — GABAPENTIN 400 MG: 400 CAPSULE ORAL at 08:08

## 2024-08-28 RX ADMIN — DULOXETINE HYDROCHLORIDE 30 MG: 30 CAPSULE, DELAYED RELEASE ORAL at 08:08

## 2024-08-28 RX ADMIN — WARFARIN SODIUM 2 MG: 2 TABLET ORAL at 04:08

## 2024-08-28 RX ADMIN — GABAPENTIN 100 MG: 100 CAPSULE ORAL at 08:08

## 2024-08-28 RX ADMIN — Medication 400 MG: at 08:08

## 2024-08-28 RX ADMIN — POTASSIUM CHLORIDE 40 MEQ: 1500 TABLET, EXTENDED RELEASE ORAL at 08:08

## 2024-08-28 RX ADMIN — BUMETANIDE 2 MG: 1 TABLET ORAL at 08:08

## 2024-08-28 RX ADMIN — INSULIN ASPART 20 UNITS: 100 INJECTION, SOLUTION INTRAVENOUS; SUBCUTANEOUS at 08:08

## 2024-08-28 RX ADMIN — AMIODARONE HYDROCHLORIDE 0.5 MG/MIN: 1.8 INJECTION, SOLUTION INTRAVENOUS at 04:08

## 2024-08-28 RX ADMIN — ASPIRIN 81 MG: 81 TABLET, COATED ORAL at 08:08

## 2024-08-28 RX ADMIN — INSULIN GLARGINE 30 UNITS: 100 INJECTION, SOLUTION SUBCUTANEOUS at 08:08

## 2024-08-28 RX ADMIN — INSULIN ASPART 20 UNITS: 100 INJECTION, SOLUTION INTRAVENOUS; SUBCUTANEOUS at 11:08

## 2024-08-28 RX ADMIN — CEFEPIME 1 G: 1 INJECTION, POWDER, FOR SOLUTION INTRAMUSCULAR; INTRAVENOUS at 08:08

## 2024-08-28 RX ADMIN — AMIODARONE HYDROCHLORIDE 400 MG: 200 TABLET ORAL at 08:08

## 2024-08-28 RX ADMIN — AMLODIPINE BESYLATE 5 MG: 5 TABLET ORAL at 08:08

## 2024-08-28 RX ADMIN — SPIRONOLACTONE 25 MG: 25 TABLET ORAL at 08:08

## 2024-08-28 NOTE — PROGRESS NOTES
[facility] - Cardiology Stepdown  Infectious Disease  Progress Note    Patient Name: Kevan Queen  MRN: [id number]  Admission Date: 8/25/2024  Length of Stay: 3 days  Attending Physician: Dalia Crum MD  Primary Care Provider: Rosio Armendariz FNP    Isolation Status: No active isolations  Assessment/Plan:      Cardiac/Vascular  * LVAD (left ventricular assist device) present  I independently reviewed patient's lab work and images as documented. 38 yo male with LVAD HM3 as DT, prior MSSA DLESI/bacteremia c/b empyema (on suppressive cefadroxil) and eroded ICD/pocket infection s/p removal, seizure admitted for back pain. Hospital course notable for US of chest with hypoechoic area of unclear significance. DLES cx so far no growth to date, blood cx negative. Isolated leukocytosis now resolved. CT without focal infectious source - R chest mass favored to be gynecomastia.     Recommendations:  -continue cefadroxil 500 mg bid (suppressive therapy)            Thank you for your consult. Will sign off, please call with questions, new culture data or clinical changes. Above d/w primary team.       Laura Baldwin MD  Infectious Disease  [facility] - Cardiology Stepdown    Subjective:     Principal Problem:LVAD (left ventricular assist device) present    HPI: 38 yo male with LVAD HM3 as DT, prior MSSA DLESI/bacteremia c/b empyema (on suppressive cefadroxil) and eroded ICD/pocket infection s/p removal, seizure admitted for back pain. ID consulted for abx recs. Hospital course notable for US of chest with hypoechoic area of unclear significance. DLES cx in process. Pt reported adherence to cefadroxil - denied issues with it. Denied fevers, chills, abdominal complaints or worsening drainage from DLES. Pt reported that he ran out of his primacor and had some back pain soon after. Denied problems with PICC. Reported swelling under R breast - pt states he is unclear how long he's had the swelling.            Interval  History: No fevers documented overnight.   Reported muscle pain around back.     Review of Systems   Constitutional:  Negative for chills and fever.   Musculoskeletal:  Positive for myalgias.   All other systems reviewed and are negative.    Objective:     Vital Signs (Most Recent):  Temp: 98 °F (36.7 °C) (08/28/24 1106)  Pulse: 88 (08/28/24 1106)  Resp: 18 (08/28/24 1106)  BP: (!) 84/0 (08/28/24 1149)  SpO2: 98 % (08/28/24 1106) Vital Signs (24h Range):  Temp:  [98 °F (36.7 °C)-98.5 °F (36.9 °C)] 98 °F (36.7 °C)  Pulse:  [] 88  Resp:  [17-18] 18  SpO2:  [94 %-99 %] 98 %  BP: ()/(0-77) 84/0     Weight: 68.5 kg (151 lb 0.2 oz)  Body mass index is 18.88 kg/m².    Estimated Creatinine Clearance: 87.4 mL/min (based on SCr of 1.1 mg/dL).     Physical Exam  Constitutional:       Appearance: He is not ill-appearing or toxic-appearing.   Pulmonary:      Effort: Pulmonary effort is normal. No respiratory distress.   Abdominal:      General: There is no distension.      Tenderness: There is no abdominal tenderness. There is no guarding.      Comments: LVAD dressed   Skin:     General: Skin is warm and dry.      Comments: R chest lump - no induration, ttp or fluctuance appreciated  L picc   Neurological:      Mental Status: He is alert and oriented to person, place, and time.          Significant Labs:   Microbiology Results (last 7 days)       Procedure Component Value Units Date/Time    Culture, Anaerobe [[id number]] Collected: 08/25/24 1733    Order Status: Completed Specimen: Wound from Abdomen Updated: 08/28/24 1010     Anaerobic Culture Culture in progress    Narrative:      Drive line exit site    Culture, Anaerobe [[id number]] Collected: 08/27/24 1446    Order Status: Completed Specimen: Skin from Abdomen Updated: 08/28/24 0754     Anaerobic Culture Culture in progress    Narrative:      Driveline site    Blood culture [[id number]] Collected: 08/27/24 1751    Order Status: Completed Specimen: Blood Updated:  08/28/24 0115     Blood Culture, Routine No Growth to date    Narrative:      Blood culture # 2 different location.    Blood culture [[id number]] Collected: 08/27/24 1802    Order Status: Completed Specimen: Blood Updated: 08/28/24 0115     Blood Culture, Routine No Growth to date    Aerobic culture [[id number]] Collected: 08/27/24 1446    Order Status: Sent Specimen: Skin from Abdomen Updated: 08/27/24 1502    Blood culture [[id number]]     Order Status: Canceled Specimen: Blood     Blood culture [[id number]]     Order Status: Canceled Specimen: Blood     Aerobic culture [[id number]] Collected: 08/25/24 1733    Order Status: Sent Specimen: Wound from Abdomen Updated: 08/25/24 1734            Significant Imaging: I have reviewed all pertinent imaging results/findings within the past 24 hours.

## 2024-08-28 NOTE — PROGRESS NOTES
Rounded on patient at bedside. Patient is A&Ox 4. LVAD numbers WNL compared to baseline. Pt denies any needs at this time.  Patient reported having enough dressing supplies at home for discharge. Patient reported no equipment needs. Encouraged pt to notify nurse if they have any questions, problems or concerns for LVAD coordinator.  Pt verbalized understanding and in agreement of plan. Plan for team to continue to round on patient.

## 2024-08-28 NOTE — ASSESSMENT & PLAN NOTE
BG goal 140-180  Noncompliant T2DM.     - Increase Lantus (Insulin Glargine) to 30 units BID (25% increase due to elevated fasting blood glucose)  - Continue Novolog (Insulin Aspart) 20 units TIDWM. Will consider increasing if patient has prandial excursions  - Continue moderate dose correction (150/25)  - BG checks AC/HS  - Hypoglycemia protocol in place    ** Please notify Endocrine for any change and/or advance in diet**  ** Please call Endocrine for any BG related issues **    Discharge Planning:   TBD. Please notify endocrinology prior to discharge.  Patient requires glucometer and testing supplies.  Recommend he resumes his William for blood sugar monitoring.

## 2024-08-28 NOTE — PROGRESS NOTES
08/28/2024  Mirela Perez    Current provider:  Dalia Crum MD    Device interrogation:      8/28/2024     8:08 AM 8/28/2024     3:34 AM 8/27/2024    11:51 PM 8/27/2024     7:37 PM 8/27/2024     3:59 PM 8/27/2024    11:54 AM 8/27/2024     8:06 AM   TXP LVAD INTERROGATIONS   Type HeartMate3 HeartMate3 HeartMate3 HeartMate3 HeartMate3 HeartMate3 HeartMate3   Flow 4.2 3.7 3.8 4 3.6 4.1 3.7   Speed 5100 5100 5100 5100 5100 5100 5100   PI 5 6 6.2 5.4 6.1 5.3 5.4   Power (Serna) 3.7 3.5 3.5 3.5 3.6 3.6 3.6   LSL  4700 4700 4700  4700 4700   Pulsatility  Intermittent pulse Intermittent pulse Intermittent pulse             Rounded on Kevan Queen to ensure all mechanical assist device settings (IABP or VAD) were appropriate and all parameters were within limits.  I was able to ensure all back up equipment was present, the staff had no issues, and the Perfusion Department daily rounding was complete.      For implantable VADs: Interrogation of Ventricular assist device was performed with analysis of device parameters and review of device function. I have personally reviewed the interrogation findings and agree with findings as stated.     In emergency, the nursing units have been notified to contact the perfusion department either by:  Calling [phone] from 630am to 4pm Mon thru Fri, utilizing the On-Call Finder functionality of Epic and searching for Perfusion, or by contacting the hospital  from 4pm to 630am and on weekends and asking to speak with the perfusionist on call.    1:19 PM

## 2024-08-28 NOTE — ASSESSMENT & PLAN NOTE
Pt technically meets SIRS criteria due to elevated WBC and tachycardia, though tachycardia is in the setting of aflutter requiring amiodarone. Pt does remain tachycardic today but is in NSR. Possible sources of infection: driveline site (though cultures negative thus far from admission, will repeat cultures), and mass vs fluid collection in R breast.   -CT chest/abdomen and pelvis w/ contrast pending  -WBC downtrending/normalized, likely a reaction to ovn episode of tachycardia.   -f/up cultures  -ID consult placed, s/off, will stay on home driveline suppression antibiotics

## 2024-08-28 NOTE — SUBJECTIVE & OBJECTIVE
Interval History: No fevers documented overnight.   Reported muscle pain around back.     Review of Systems   Constitutional:  Negative for chills and fever.   Musculoskeletal:  Positive for myalgias.   All other systems reviewed and are negative.    Objective:     Vital Signs (Most Recent):  Temp: 98 °F (36.7 °C) (08/28/24 1106)  Pulse: 88 (08/28/24 1106)  Resp: 18 (08/28/24 1106)  BP: (!) 84/0 (08/28/24 1149)  SpO2: 98 % (08/28/24 1106) Vital Signs (24h Range):  Temp:  [98 °F (36.7 °C)-98.5 °F (36.9 °C)] 98 °F (36.7 °C)  Pulse:  [] 88  Resp:  [17-18] 18  SpO2:  [94 %-99 %] 98 %  BP: ()/(0-77) 84/0     Weight: 68.5 kg (151 lb 0.2 oz)  Body mass index is 18.88 kg/m².    Estimated Creatinine Clearance: 87.4 mL/min (based on SCr of 1.1 mg/dL).     Physical Exam  Constitutional:       Appearance: He is not ill-appearing or toxic-appearing.   Pulmonary:      Effort: Pulmonary effort is normal. No respiratory distress.   Abdominal:      General: There is no distension.      Tenderness: There is no abdominal tenderness. There is no guarding.      Comments: LVAD dressed   Skin:     General: Skin is warm and dry.      Comments: R chest lump - no induration, ttp or fluctuance appreciated  L picc   Neurological:      Mental Status: He is alert and oriented to person, place, and time.          Significant Labs:   Microbiology Results (last 7 days)       Procedure Component Value Units Date/Time    Culture, Anaerobe [[id number]] Collected: 08/25/24 1733    Order Status: Completed Specimen: Wound from Abdomen Updated: 08/28/24 1010     Anaerobic Culture Culture in progress    Narrative:      Drive line exit site    Culture, Anaerobe [[id number]] Collected: 08/27/24 1446    Order Status: Completed Specimen: Skin from Abdomen Updated: 08/28/24 0754     Anaerobic Culture Culture in progress    Narrative:      Driveline site    Blood culture [[id number]] Collected: 08/27/24 1751    Order Status: Completed Specimen: Blood  Updated: 08/28/24 0115     Blood Culture, Routine No Growth to date    Narrative:      Blood culture # 2 different location.    Blood culture [[id number]] Collected: 08/27/24 1802    Order Status: Completed Specimen: Blood Updated: 08/28/24 0115     Blood Culture, Routine No Growth to date    Aerobic culture [[id number]] Collected: 08/27/24 1446    Order Status: Sent Specimen: Skin from Abdomen Updated: 08/27/24 1502    Blood culture [[id number]]     Order Status: Canceled Specimen: Blood     Blood culture [[id number]]     Order Status: Canceled Specimen: Blood     Aerobic culture [[id number]] Collected: 08/25/24 1733    Order Status: Sent Specimen: Wound from Abdomen Updated: 08/25/24 1734            Significant Imaging: I have reviewed all pertinent imaging results/findings within the past 24 hours.

## 2024-08-28 NOTE — SUBJECTIVE & OBJECTIVE
"Interval HPI:   No acute events overnight. Patient in room 319/319 A. Blood glucose  variable . BG at, above, and below goal on current insulin regimen (SSI, prandial, and basal insulin ). Steroid use- None.    Renal function- Normal   Lab Results   Component Value Date    CREATININE 1.1 2024     Vasopressors-  None     Endocrine will continue to follow and manage insulin orders inpatient.     Diet diabetic Cardiac (Low Na/Chol); 2000 Calorie; Fluid - 1500mL; Standard Tray     Eatin%  Nausea: No  Hypoglycemia and intervention: No  Fever: No  TPN and/or TF: No  If yes, type of TF/TPN and rate: N/A    BP (!) 80/0 (BP Location: Right arm, Patient Position: Lying)   Pulse 92   Temp 98 °F (36.7 °C) (Oral)   Resp 18   Ht 6' 3" (1.905 m)   Wt 68.5 kg (151 lb 0.2 oz)   SpO2 99%   BMI 18.88 kg/m²     Labs Reviewed and Include    Recent Labs   Lab 24  0345   *   CALCIUM 9.0   *   K 3.6   CO2 26   CL 92*   BUN 19   CREATININE 1.1     Lab Results   Component Value Date    WBC 11.66 2024    HGB 8.0 (L) 2024    HCT 27.2 (L) 2024    MCV 65 (L) 2024     2024     Recent Labs   Lab 24  1058   TSH 2.009     Lab Results   Component Value Date    HGBA1C 10.3 (H) 2024       Nutritional status:   Body mass index is 18.88 kg/m².  Lab Results   Component Value Date    ALBUMIN 2.2 (L) 2024    ALBUMIN 2.4 (L) 2024    ALBUMIN 2.6 (L) 2024     Lab Results   Component Value Date    PREALBUMIN 8 (L) 2024    PREALBUMIN 7 (L) 2024    PREALBUMIN 8 (L) 2024       Estimated Creatinine Clearance: 87.4 mL/min (based on SCr of 1.1 mg/dL).    Accu-Checks  Recent Labs     24  0655 24  0656 24  1637 24  0140 24  0645 24  1055 24  1631 24  0625   POCTGLUCOSE 431* 357* 204* 182* 258* 241* 369* 171* 94 279*       Current Medications and/or Treatments Impacting " Glycemic Control  Immunotherapy:    Immunosuppressants       None          Steroids:   Hormones (From admission, onward)      None          Pressors:    Autonomic Drugs (From admission, onward)      None          Hyperglycemia/Diabetes Medications:   Antihyperglycemics (From admission, onward)      Start     Stop Route Frequency Ordered    08/28/24 0900  insulin glargine U-100 (Lantus) pen 30 Units         -- SubQ 2 times daily 08/28/24 0721    08/27/24 1130  insulin aspart U-100 pen 20 Units         -- SubQ 3 times daily with meals 08/27/24 0819    08/25/24 1506  insulin aspart U-100 pen 0-10 Units         -- SubQ Before meals & nightly PRN 08/25/24 1407

## 2024-08-28 NOTE — TELEPHONE ENCOUNTER
Spoke with Digna via p/c. Made aware that patient is currently admitted and will not be started back on or discharged with Primacor. Digna will send over D/C orders to be singed by Dr. Crum.     ----- Message from Lucinda López sent at 8/28/2024 11:32 AM CDT -----  Regarding: orders  Digna from [facility] calling regarding patient appts because he's on hold. Please call back @ [phone]. Thank you Lucinda

## 2024-08-28 NOTE — NURSING
LVAD dressing change completed using sterile technique with daily kit.  DLES is a 1 with minimal drainage noted on the drain sponge. Tolerated without any complication. no redness, or tenderness noted.

## 2024-08-28 NOTE — PROGRESS NOTES
[facility]  Heart Transplant  Progress Note    Patient Name: Kevan Queen  MRN: [id number]  Admission Date: 8/25/2024  Hospital Length of Stay: 3 days  Attending Physician: Dalia Crum MD  Primary Care Provider: Rosio Armendariz FNP  Principal Problem:LVAD (left ventricular assist device) present    Subjective:   Interval History: Pt doing well overnight, denies any denies any chest pain, shortness of breath, lightheadedness, dizziness, fevers or chills overnight.  Will transition from IV amio to PO, complete load with PO and then transition to maintenance dosing of amio.     Responding well to 2 mg of Bumex for diuresis, no issue any allergic reaction.  Will remove from allergy list.  JVD present but improved today. No abdominal distension, abdomen soft. Pt to have cont palliative care discussions regarding goals, pt educated on need for compliance. Pt education on RV function poor.       CT scan unfortunately completed without contrast rather than with contrast order, unable to accurately assess if mass vs fluid collection at right breast. New lymphadenopathy as well notable on CT scan not present on previous. Will obtain a CT with contrast T/A/P. Pt educated on possible need for outpatient f/up and mammography if truly gynecomastia. Pt agreeable. Will d/c spironolactone and transition to epleronone.     Continuous Infusions:      Scheduled Meds:   [START ON 9/9/2024] amiodarone  200 mg Oral Daily    amiodarone  400 mg Oral BID    amLODIPine  5 mg Oral Daily    aspirin  81 mg Oral Daily    atorvastatin  40 mg Oral Daily    bumetanide  2 mg Oral BID loop    cefadroxil  500 mg Oral Q12H    DULoxetine  30 mg Oral Daily    eplerenone  25 mg Oral Daily    gabapentin  100 mg Oral BID    gabapentin  400 mg Oral QHS    insulin aspart U-100  20 Units Subcutaneous TIDWM    insulin glargine U-100  30 Units Subcutaneous BID    levETIRAcetam  1,500 mg Oral BID    LIDOcaine  2 patch Transdermal  Q24H    magnesium oxide  400 mg Oral Daily    nicotine  1 patch Transdermal Daily    pantoprazole  40 mg Oral Daily    warfarin  2 mg Oral Daily     PRN Meds:  Current Facility-Administered Medications:     acetaminophen, 650 mg, Oral, Q6H PRN    cyclobenzaprine, 5 mg, Oral, Daily PRN    dextrose 10%, 12.5 g, Intravenous, PRN    dextrose 10%, 25 g, Intravenous, PRN    glucagon (human recombinant), 1 mg, Intramuscular, PRN    glucose, 16 g, Oral, PRN    glucose, 24 g, Oral, PRN    insulin aspart U-100, 0-10 Units, Subcutaneous, QID (AC + HS) PRN    ondansetron, 4 mg, Oral, Q8H PRN    Review of patient's allergies indicates:   Allergen Reactions    Bumex [bumetanide] Hives    Torsemide Hives     Objective:     Vital Signs (Most Recent):  Temp: 98 °F (36.7 °C) (08/28/24 1106)  Pulse: 88 (08/28/24 1106)  Resp: 18 (08/28/24 1106)  BP: (!) 84/0 (08/28/24 1149)  SpO2: 98 % (08/28/24 1106) Vital Signs (24h Range):  Temp:  [98 °F (36.7 °C)-98.5 °F (36.9 °C)] 98 °F (36.7 °C)  Pulse:  [] 88  Resp:  [17-18] 18  SpO2:  [94 %-99 %] 98 %  BP: ()/(0-77) 84/0     Patient Vitals for the past 72 hrs (Last 3 readings):   Weight   08/28/24 0330 68.5 kg (151 lb 0.2 oz)   08/27/24 0415 68.9 kg (151 lb 14.4 oz)   08/26/24 1516 70.8 kg (156 lb 1.4 oz)     Body mass index is 18.88 kg/m².      Intake/Output Summary (Last 24 hours) at 8/28/2024 1403  Last data filed at 8/28/2024 1226  Gross per 24 hour   Intake 1045 ml   Output 2900 ml   Net -1855 ml       Hemodynamic Parameters:  CVP:  [15 mmHg] 15 mmHg    EKG- without ischemic changes this AM        Physical Exam  Constitutional:       General: He is not in acute distress.     Appearance: He is normal weight.   HENT:      Head: Normocephalic and atraumatic.      Right Ear: External ear normal.      Left Ear: External ear normal.      Nose: Nose normal.      Mouth/Throat:      Pharynx: Oropharynx is clear.   Cardiovascular:      Comments: Normal VAD hum, JVD present but improving    Pulmonary:      Effort: Pulmonary effort is normal.   Abdominal:      General: Abdomen is flat. Bowel sounds are normal. There is no distension (Distended but soft.).      Palpations: Abdomen is soft. There is no mass.      Tenderness: There is no abdominal tenderness.   Musculoskeletal:      Cervical back: Normal range of motion.      Right lower leg: No edema.      Left lower leg: No edema.      Comments: Cont b/l back pain- upper spine and b/l paraspinal/upper back muscular pain.    Skin:     General: Skin is warm.      Comments: Chest wall on the right side with gynecomastia, no overlying skin changes, no tenderness at the site however palpable mass versus nodule present.   Neurological:      Mental Status: He is alert. Mental status is at baseline.            Significant Labs:  CBC:  Recent Labs   Lab 08/27/24  0530 08/27/24  1443 08/28/24  0345   WBC 14.40* 10.43 11.66   RBC 4.55* 4.38* 4.21*   HGB 8.8* 8.3* 8.0*   HCT 29.1* 29.2* 27.2*    285 309   MCV 64* 67* 65*   MCH 19.3* 18.9* 19.0*   MCHC 30.2* 28.4* 29.4*     BNP:  Recent Labs   Lab 08/25/24  1150 08/26/24  0508 08/28/24  0345   * 461* 376*     CMP:  Recent Labs   Lab 08/25/24  1150 08/25/24  2029 08/26/24  0508 08/26/24  2221 08/27/24  0530 08/28/24  0345   *   < > 374* 122* 161* 219*   CALCIUM 8.8   < > 8.8 8.9 8.9 9.0   ALBUMIN 2.4*  --  2.2*  --   --  2.4*   PROT 7.9  --  7.4  --   --  7.6   *   < > 130* 133* 130* 131*   K 2.7*   < > 4.0 3.3* 4.1 3.6   CO2 31*   < > 29 26 28 26   CL 92*   < > 94* 94* 92* 92*   BUN 11   < > 13 13 14 19   CREATININE 1.0   < > 1.0 1.0 1.0 1.1   ALKPHOS 207*  --  199*  --   --  215*   ALT 14  --  13  --   --  13   AST 24  --  23  --   --  24   BILITOT 2.3*  --  2.2*  --   --  2.4*    < > = values in this interval not displayed.      Coagulation:   Recent Labs   Lab 08/26/24  0508 08/27/24  0530 08/28/24  0345   INR 4.3* 2.7* 2.3*   APTT 83.1* 51.1* 43.1*     LDH:  Recent Labs   Lab  08/26/24  0508 08/27/24  0530 08/28/24  0345   * 310* 326*     Microbiology:  Microbiology Results (last 7 days)       Procedure Component Value Units Date/Time    Aerobic culture [[id number]]  (Abnormal) Collected: 08/27/24 1446    Order Status: Completed Specimen: Skin from Abdomen Updated: 08/28/24 1319     Aerobic Bacterial Culture STAPHYLOCOCCUS AUREUS  Rare  Susceptibility pending      Narrative:      Driveline site    Culture, Anaerobe [[id number]] Collected: 08/25/24 1733    Order Status: Completed Specimen: Wound from Abdomen Updated: 08/28/24 1010     Anaerobic Culture Culture in progress    Narrative:      Drive line exit site    Culture, Anaerobe [[id number]] Collected: 08/27/24 1446    Order Status: Completed Specimen: Skin from Abdomen Updated: 08/28/24 0754     Anaerobic Culture Culture in progress    Narrative:      Driveline site    Blood culture [[id number]] Collected: 08/27/24 1751    Order Status: Completed Specimen: Blood Updated: 08/28/24 0115     Blood Culture, Routine No Growth to date    Narrative:      Blood culture # 2 different location.    Blood culture [[id number]] Collected: 08/27/24 1802    Order Status: Completed Specimen: Blood Updated: 08/28/24 0115     Blood Culture, Routine No Growth to date    Blood culture [[id number]]     Order Status: Canceled Specimen: Blood     Blood culture [[id number]]     Order Status: Canceled Specimen: Blood     Aerobic culture [[id number]] Collected: 08/25/24 1733    Order Status: Sent Specimen: Wound from Abdomen Updated: 08/25/24 1734            I have reviewed all pertinent labs within the past 24 hours.    Estimated Creatinine Clearance: 87.4 mL/min (based on SCr of 1.1 mg/dL).    Diagnostic Results:  I have reviewed and interpreted all pertinent imaging results/findings within the past 24 hours.  Assessment and Plan:     Mr. Kevan Thacker is a 39 year old male with stage D 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 Familial CM-Father had LVAD and subsequent  heart transplant), polysubstance abuse, DM, underwent DT-HM3 implantation 6/23/2022 with early RV failure requiring RVAD with ProTek Duo after admitted with ADHF/cardiogenic shock on home milrinone requiring IABP. He underwent RVAD removal and chest closure 6/30/2022.  He was weaned off  but he had to restarted due to RVF. He was eventually transitioned to milrinone (secondary to  shortage) now supposed to be on 0.25 mcg/kg/min. He has a history of unclear syncope vs seizures and is followed by neuro for this and is on Keppra.  [facility] with ongoing [facility] pain.      On 11/15/23 underwent removal of eroded [employer] scICD--no Lifevest (due to LVAD) and no plan to replace ICD as not requiring therapy post LVAD with concern for reinfection.  He has not had neurology f/u with seizure hx on keppra so coordinator to assist him with obtaining appt.      Reports back pain (primarily upper back pain) for the past 3-4 days. He reports that it started 2 days after stopping pirmacor and he feels it may be related. He has also been sleeping in the couch and identifies that it may also be related to the back pain. He denies lifting anything heavy or any falls /trauma. No red flags including incontinence of stool or urine. No numbness in the groin area or weakness in the legs or upper extremity reported. He reports he was not able to go for appointment because of transport issue that has now been resolved. He denies orthopnea, PND, weight gain, leg swelling. He says that he does have some shortness of breath after walking long distances which has been ongoing for months now and has not changed lately. He has lost some weight over last few months since his but weight has been stable over the last month. He denied headache, constipation, diarrhea, SOB, cough, palpitations or any additional symptoms.     * LVAD (left ventricular assist device) present  -HeartMate 3  Implanted  6/29/2022  as DT  -HTS Primary  -cont coumadin, Goal INR 2.0-3.0.   Lab Results   Component Value Date    INR 2.3 (H) 08/28/2024    INR 2.7 (H) 08/27/2024    INR 4.3 (H) 08/26/2024       -Antiplatelets ASA 81 mg  -LDH is stable overall today. Will continue to monitor daily.  -Speed set at 5100, LSL 4700 rpm  -Interrogation notable for  power cable disconnected, low voltage advisory, PI events  -Not listed for OHTx- advised pt he would not be candidate for OHTx (noncompliance/poor f/up)   -UDS negative  -attempted to call partner Robyn, unfortunately goes to        Procedure: Device Interrogation Including analysis of device parameters  Current Settings: Ventricular Assist Device  Review of device function is stable/unstable stable        8/28/2024     8:08 AM 8/28/2024     3:34 AM 8/27/2024    11:51 PM 8/27/2024     7:37 PM 8/27/2024     3:59 PM 8/27/2024    11:54 AM 8/27/2024     8:06 AM   TXP LVAD INTERROGATIONS   Type HeartMate3 HeartMate3 HeartMate3 HeartMate3 HeartMate3 HeartMate3 HeartMate3   Flow 4.2 3.7 3.8 4 3.6 4.1 3.7   Speed 5100 5100 5100 5100 5100 5100 5100   PI 5 6 6.2 5.4 6.1 5.3 5.4   Power (Serna) 3.7 3.5 3.5 3.5 3.6 3.6 3.6   LSL  4700 4700 4700  4700 4700   Pulsatility  Intermittent pulse Intermittent pulse Intermittent pulse            CHF (NYHA class IV, ACC/AHA stage D)    Updated 8/26/24:  EF 5-10% global hypokinesis, RV enlargement and global hypokinesis, biatrial enlargement, mild-to-moderate MR, severe TR, no van HeartMate 3 in place, aortic valve does not open, LVEDd 7.2  Previous TTE 9/5/23 with EF 10-15%, mod to sever TR, LVEDd 7.11, LVAD - HM3 in place    -anticipate potential discharge Friday   -continue on bumex 2mg BID   -GDMT: d/c spironolactone given possible gynecomastia, transition to epleronone   -Previously on home Primacor 0.25 at home but ran out approx 5 days prior to admission and also reports he occasionally disconnects himself from his pump to perform  certain activities. Will plan to d/c off primacor   - TTE With LVEF 5-10%, poor RV function, dilation   -JVD improving with bumex 2mg   -palliative care c/s and pt discussed with service, appreciate f/up- plan for continued follow up with palliative outpatient as well with Dr. Carr       SIRS (systemic inflammatory response syndrome)  Pt technically meets SIRS criteria due to elevated WBC and tachycardia, though tachycardia is in the setting of aflutter requiring amiodarone. Pt does remain tachycardic today but is in NSR. Possible sources of infection: driveline site (though cultures negative thus far from admission, will repeat cultures), and mass vs fluid collection in R breast.   -CT chest/abdomen and pelvis w/ contrast pending  -WBC downtrending/normalized, likely a reaction to ovn episode of tachycardia.   -f/up cultures  -ID consult placed, s/off, will stay on home driveline suppression antibiotics     Atrial flutter  pt previously went into atach vs aflutter. Pt was started on amiodarone, with rhythm aborted to NSR following bolus. BP remained stable.   -BMP, replace lytes as needed   -cont amiodarone- will complete load and keep on maintenance dosing thereafter  -pt does report intermittently at home having episodes of palpitations that may also represent paroxysmal afib vs flutter at home. Already on a/c. Chadvasc of 5          Subcutaneous nodule of breast  Pt with unilateral R sided gynecomastia with hard nodular feeling beneath soft subcutaneous tissue of breast. No notable skin dimpling or rash.   -CT w/out contrast and US w/ likely gynecomastia, unable to r/out abscess, will get ct with contrast. New lymph node swelling seen on CT scan compared to old CTs.  Will need mammogram outpatient (discussed with pt).   -transition from spironolactone to epleronone     Supratherapeutic INR  -goal INR 2-3 , INR 4.7 on admission  - pharmacy  to manage coumadin dosing, cont warfarin   Check INR daily        Bilateral back pain  Pt reports initial pain following running out of primacor. Does have intermittent chest pain/pressure with straining on the toilet as well but no radiation of pain or issues with nausea/vomiting/diaphoresis/sob with symptoms. S/p spinal Xrays without significant abnormality.   NO red flag symptoms on admission  C/w home gabapentin, added tylenol prn and lidocaine patches   -CT T/A/P with contrast pending, does seem to be MSK pain given not isolated to spine/is also paraspinal and extends b/l to muscles in upper back. Discussed pts DJD on Spinal x rays as well.      Polysubstance abuse  Pt has history of polysubstance use.   -UDS ordered given noncompliance      Hypokalemia  2.7 on admission  Replace and monitor      Type 2 diabetes mellitus with hyperglycemia, with long-term current use of insulin  A1C 10.3  Endocrinology consulted for glucose management, largely uncontrolled       Lab Results   Component Value Date    HGBA1C 10.3 (H) 08/25/2024    HGBA1C >14.0 (H) 04/26/2024    HGBA1C >14.0 (H) 02/17/2024    [id number] 7.5 06/12/2023    [id number] 8.5 02/07/2023           Moderate malnutrition  -pt with aggressive care goals  -c/s nutrition, appreciate assistance     Seizure-like activity  C/w home keppra 1.5 gm BID        Alice Ayala MD  Heart Transplant  [facility] - [facility]

## 2024-08-28 NOTE — SUBJECTIVE & OBJECTIVE
Interval History: Pt doing well overnight, denies any denies any chest pain, shortness of breath, lightheadedness, dizziness, fevers or chills overnight.  Will transition from IV amio to PO, complete load with PO and then transition to maintenance dosing of amio.     Responding well to 2 mg of Bumex for diuresis, no issue any allergic reaction.  Will remove from allergy list.  JVD present but improved today. No abdominal distension, abdomen soft. Pt to have cont palliative care discussions regarding goals, pt educated on need for compliance. Pt education on RV function poor.       CT scan unfortunately completed without contrast rather than with contrast order, unable to accurately assess if mass vs fluid collection at right breast. New lymphadenopathy as well notable on CT scan not present on previous. Will obtain a CT with contrast T/A/P. Pt educated on possible need for outpatient f/up and mammography if truly gynecomastia. Pt agreeable. Will d/c spironolactone and transition to epleronone.     Continuous Infusions:      Scheduled Meds:   [START ON 9/9/2024] amiodarone  200 mg Oral Daily    amiodarone  400 mg Oral BID    amLODIPine  5 mg Oral Daily    aspirin  81 mg Oral Daily    atorvastatin  40 mg Oral Daily    bumetanide  2 mg Oral BID loop    cefadroxil  500 mg Oral Q12H    DULoxetine  30 mg Oral Daily    eplerenone  25 mg Oral Daily    gabapentin  100 mg Oral BID    gabapentin  400 mg Oral QHS    insulin aspart U-100  20 Units Subcutaneous TIDWM    insulin glargine U-100  30 Units Subcutaneous BID    levETIRAcetam  1,500 mg Oral BID    LIDOcaine  2 patch Transdermal Q24H    magnesium oxide  400 mg Oral Daily    nicotine  1 patch Transdermal Daily    pantoprazole  40 mg Oral Daily    warfarin  2 mg Oral Daily     PRN Meds:  Current Facility-Administered Medications:     acetaminophen, 650 mg, Oral, Q6H PRN    cyclobenzaprine, 5 mg, Oral, Daily PRN    dextrose 10%, 12.5 g, Intravenous, PRN    dextrose 10%, 25  g, Intravenous, PRN    glucagon (human recombinant), 1 mg, Intramuscular, PRN    glucose, 16 g, Oral, PRN    glucose, 24 g, Oral, PRN    insulin aspart U-100, 0-10 Units, Subcutaneous, QID (AC + HS) PRN    ondansetron, 4 mg, Oral, Q8H PRN    Review of patient's allergies indicates:   Allergen Reactions    Bumex [bumetanide] Hives    Torsemide Hives     Objective:     Vital Signs (Most Recent):  Temp: 98 °F (36.7 °C) (08/28/24 1106)  Pulse: 88 (08/28/24 1106)  Resp: 18 (08/28/24 1106)  BP: (!) 84/0 (08/28/24 1149)  SpO2: 98 % (08/28/24 1106) Vital Signs (24h Range):  Temp:  [98 °F (36.7 °C)-98.5 °F (36.9 °C)] 98 °F (36.7 °C)  Pulse:  [] 88  Resp:  [17-18] 18  SpO2:  [94 %-99 %] 98 %  BP: ()/(0-77) 84/0     Patient Vitals for the past 72 hrs (Last 3 readings):   Weight   08/28/24 0330 68.5 kg (151 lb 0.2 oz)   08/27/24 0415 68.9 kg (151 lb 14.4 oz)   08/26/24 1516 70.8 kg (156 lb 1.4 oz)     Body mass index is 18.88 kg/m².      Intake/Output Summary (Last 24 hours) at 8/28/2024 1403  Last data filed at 8/28/2024 1226  Gross per 24 hour   Intake 1045 ml   Output 2900 ml   Net -1855 ml       Hemodynamic Parameters:  CVP:  [15 mmHg] 15 mmHg    EKG- without ischemic changes this AM        Physical Exam  Constitutional:       General: He is not in acute distress.     Appearance: He is normal weight.   HENT:      Head: Normocephalic and atraumatic.      Right Ear: External ear normal.      Left Ear: External ear normal.      Nose: Nose normal.      Mouth/Throat:      Pharynx: Oropharynx is clear.   Cardiovascular:      Comments: Normal VAD hum, JVD present but improving   Pulmonary:      Effort: Pulmonary effort is normal.   Abdominal:      General: Abdomen is flat. Bowel sounds are normal. There is no distension (Distended but soft.).      Palpations: Abdomen is soft. There is no mass.      Tenderness: There is no abdominal tenderness.   Musculoskeletal:      Cervical back: Normal range of motion.      Right  lower leg: No edema.      Left lower leg: No edema.      Comments: Cont b/l back pain- upper spine and b/l paraspinal/upper back muscular pain.    Skin:     General: Skin is warm.      Comments: Chest wall on the right side with gynecomastia, no overlying skin changes, no tenderness at the site however palpable mass versus nodule present.   Neurological:      Mental Status: He is alert. Mental status is at baseline.            Significant Labs:  CBC:  Recent Labs   Lab 08/27/24  0530 08/27/24  1443 08/28/24  0345   WBC 14.40* 10.43 11.66   RBC 4.55* 4.38* 4.21*   HGB 8.8* 8.3* 8.0*   HCT 29.1* 29.2* 27.2*    285 309   MCV 64* 67* 65*   MCH 19.3* 18.9* 19.0*   MCHC 30.2* 28.4* 29.4*     BNP:  Recent Labs   Lab 08/25/24  1150 08/26/24  0508 08/28/24  0345   * 461* 376*     CMP:  Recent Labs   Lab 08/25/24  1150 08/25/24  2029 08/26/24  0508 08/26/24  2221 08/27/24  0530 08/28/24  0345   *   < > 374* 122* 161* 219*   CALCIUM 8.8   < > 8.8 8.9 8.9 9.0   ALBUMIN 2.4*  --  2.2*  --   --  2.4*   PROT 7.9  --  7.4  --   --  7.6   *   < > 130* 133* 130* 131*   K 2.7*   < > 4.0 3.3* 4.1 3.6   CO2 31*   < > 29 26 28 26   CL 92*   < > 94* 94* 92* 92*   BUN 11   < > 13 13 14 19   CREATININE 1.0   < > 1.0 1.0 1.0 1.1   ALKPHOS 207*  --  199*  --   --  215*   ALT 14  --  13  --   --  13   AST 24  --  23  --   --  24   BILITOT 2.3*  --  2.2*  --   --  2.4*    < > = values in this interval not displayed.      Coagulation:   Recent Labs   Lab 08/26/24  0508 08/27/24  0530 08/28/24  0345   INR 4.3* 2.7* 2.3*   APTT 83.1* 51.1* 43.1*     LDH:  Recent Labs   Lab 08/26/24  0508 08/27/24  0530 08/28/24  0345   * 310* 326*     Microbiology:  Microbiology Results (last 7 days)       Procedure Component Value Units Date/Time    Aerobic culture [[id number]]  (Abnormal) Collected: 08/27/24 1446    Order Status: Completed Specimen: Skin from Abdomen Updated: 08/28/24 1319     Aerobic Bacterial Culture  STAPHYLOCOCCUS AUREUS  Rare  Susceptibility pending      Narrative:      Driveline site    Culture, Anaerobe [[id number]] Collected: 08/25/24 1733    Order Status: Completed Specimen: Wound from Abdomen Updated: 08/28/24 1010     Anaerobic Culture Culture in progress    Narrative:      Drive line exit site    Culture, Anaerobe [[id number]] Collected: 08/27/24 1446    Order Status: Completed Specimen: Skin from Abdomen Updated: 08/28/24 0754     Anaerobic Culture Culture in progress    Narrative:      Driveline site    Blood culture [[id number]] Collected: 08/27/24 1751    Order Status: Completed Specimen: Blood Updated: 08/28/24 0115     Blood Culture, Routine No Growth to date    Narrative:      Blood culture # 2 different location.    Blood culture [[id number]] Collected: 08/27/24 1802    Order Status: Completed Specimen: Blood Updated: 08/28/24 0115     Blood Culture, Routine No Growth to date    Blood culture [[id number]]     Order Status: Canceled Specimen: Blood     Blood culture [[id number]]     Order Status: Canceled Specimen: Blood     Aerobic culture [[id number]] Collected: 08/25/24 1733    Order Status: Sent Specimen: Wound from Abdomen Updated: 08/25/24 1734            I have reviewed all pertinent labs within the past 24 hours.    Estimated Creatinine Clearance: 87.4 mL/min (based on SCr of 1.1 mg/dL).    Diagnostic Results:  I have reviewed and interpreted all pertinent imaging results/findings within the past 24 hours.

## 2024-08-28 NOTE — ASSESSMENT & PLAN NOTE
Pt reports initial pain following running out of primacor. Does have intermittent chest pain/pressure with straining on the toilet as well but no radiation of pain or issues with nausea/vomiting/diaphoresis/sob with symptoms. S/p spinal Xrays without significant abnormality.   NO red flag symptoms on admission  C/w home gabapentin, added tylenol prn and lidocaine patches   -CT T/A/P with contrast pending, does seem to be MSK pain given not isolated to spine/is also paraspinal and extends b/l to muscles in upper back. Discussed pts DJD on Spinal x rays as well.

## 2024-08-28 NOTE — ASSESSMENT & PLAN NOTE
I independently reviewed patient's lab work and images as documented. 38 yo male with LVAD HM3 as DT, prior MSSA DLESI/bacteremia c/b empyema (on suppressive cefadroxil) and eroded ICD/pocket infection s/p removal, seizure admitted for back pain. Hospital course notable for US of chest with hypoechoic area of unclear significance. DLES cx so far no growth to date, blood cx negative. Isolated leukocytosis now resolved. CT without focal infectious source - R chest mass favored to be gynecomastia.     Recommendations:  -continue cefadroxil 500 mg bid (suppressive therapy)

## 2024-08-28 NOTE — ASSESSMENT & PLAN NOTE
A1C 10.3  Endocrinology consulted for glucose management, largely uncontrolled       Lab Results   Component Value Date    HGBA1C 10.3 (H) 08/25/2024    HGBA1C >14.0 (H) 04/26/2024    HGBA1C >14.0 (H) 02/17/2024    [id number] 7.5 06/12/2023    [id number] 8.5 02/07/2023

## 2024-08-28 NOTE — PLAN OF CARE
Problem: Adult Inpatient Plan of Care  Goal: Plan of Care Review  Outcome: Progressing  Flowsheets (Taken 8/28/2024 1015)  Plan of Care Reviewed With: patient  Goal: Patient-Specific Goal (Individualized)  Outcome: Progressing  Goal: Absence of Hospital-Acquired Illness or Injury  Outcome: Progressing  Intervention: Identify and Manage Fall Risk  Flowsheets (Taken 8/28/2024 1015)  Safety Promotion/Fall Prevention: assistive device/personal item within reach  Goal: Optimal Comfort and Wellbeing  Outcome: Progressing  Goal: Readiness for Transition of Care  Outcome: Progressing     Problem: Diabetes Comorbidity  Goal: Blood Glucose Level Within Targeted Range  Outcome: Progressing     Problem: Infection  Goal: Absence of Infection Signs and Symptoms  Outcome: Progressing     Problem: Ventricular Assist Device  Goal: Optimal Adjustment to Device  Outcome: Progressing  Intervention: Optimize Psychosocial Response to VAD  Flowsheets (Taken 8/28/2024 1015)  Supportive Measures: active listening utilized  Goal: Absence of Bleeding  Outcome: Progressing  Goal: Absence of Embolism Signs and Symptoms  Outcome: Progressing  Goal: Optimal Blood Flow  Outcome: Progressing  Goal: Absence of Infection Signs and Symptoms  Outcome: Progressing  Goal: Effective Right-Sided Heart Function  Outcome: Progressing     Problem: Ventricular Assist Device  Goal: Optimal Adjustment to Device  Outcome: Progressing  Goal: Absence of Bleeding  Outcome: Progressing  Goal: Absence of Embolism Signs and Symptoms  Outcome: Progressing  Goal: Optimal Blood Flow  Outcome: Progressing  Goal: Absence of Infection Signs and Symptoms  Outcome: Progressing  Goal: Effective Right-Sided Heart Function  Outcome: Progressing     Problem: Coping Ineffective  Goal: Effective Coping  Outcome: Progressing     Problem: Fall Injury Risk  Goal: Absence of Fall and Fall-Related Injury  Outcome: Progressing

## 2024-08-28 NOTE — CONSULTS
"Consult poor oral intake    Recommendations  1. Recommend double portions as medically feasible   2. Initiate Boost Plus (vanilla) BID to optimize nutritional intake  3. RD following.       General Information Comments:  RD consulted for poor PO intake. Pt is currently being followed by RD (Initial assessment completed on 08/26/24). Pt reports a 100% PO intake of meal trays today during RD visit (100% is also noted in pt's flowsheet). Noted an average PO intake ranging from % x 48 hrs per flowsheet. Pt verbalizes that he "likes to eat" and is agreeable to adding Boost Plus (Vanilla) twice daily. Pt also request to have double portions as medically feasible. RD following.    Next Follow Up Date:  09/02/24    Thank You,    Lisa Ross, Registration Eligible, Provisional LDN    "

## 2024-08-28 NOTE — ASSESSMENT & PLAN NOTE
Updated 8/26/24:  EF 5-10% global hypokinesis, RV enlargement and global hypokinesis, biatrial enlargement, mild-to-moderate MR, severe TR, no van HeartMate 3 in place, aortic valve does not open, LVEDd 7.2  Previous TTE 9/5/23 with EF 10-15%, mod to sever TR, LVEDd 7.11, LVAD - HM3 in place    -anticipate potential discharge Friday   -continue on bumex 2mg BID   -GDMT: d/c spironolactone given possible gynecomastia, transition to epleronone   -Previously on home Primacor 0.25 at home but ran out approx 5 days prior to admission and also reports he occasionally disconnects himself from his pump to perform certain activities. Will plan to d/c off primacor   - TTE With LVEF 5-10%, poor RV function, dilation   -JVD improving with bumex 2mg   -palliative care c/s and pt discussed with service, appreciate f/up- plan for continued follow up with palliative outpatient as well with Dr. Carr

## 2024-08-28 NOTE — ASSESSMENT & PLAN NOTE
Pt with unilateral R sided gynecomastia with hard nodular feeling beneath soft subcutaneous tissue of breast. No notable skin dimpling or rash.   -CT w/out contrast and US w/ likely gynecomastia, unable to r/out abscess, will get ct with contrast. New lymph node swelling seen on CT scan compared to old CTs.  Will need mammogram outpatient (discussed with pt).   -transition from spironolactone to epleronone

## 2024-08-28 NOTE — PROGRESS NOTES
[facility] - [facility] Step[facility]  Endocrinology  Progress Note    Admit Date: 8/25/2024     Reason for Consult: Management of T2DM, Hyperglycemia     Surgical Procedure and Date: LVAD 06/29/2022     Diabetes diagnosis year: 2022    Home Diabetes Medications:  Levemir 20 units twice daily and Novolog SSI (150/25) per patient    How often checking glucose at home?  Has not checked in a few weeks due to running out of strips    BG readings on regimen: COLLIN  Hypoglycemia on the regimen?  Yes; rarely experiences hypoglycemic symptoms such as blurry vision and vomiting. However, does not know his blood sugar level due to running out of supplies. He will self-correct with a snack.  Missed doses on regimen?  Yes, has not had insulin in a few weeks due to running out of glucometer supplies.    Diabetes Complications include:   Hyperglycemia    Complicating diabetes co morbidities:   History of MI, CHF, and CKD      HPI:   Patient is a 39 y.o. 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 He underwent RVAD removal and chest closure 6/30/2022.  He was weaned off  but restarted due to RVF. He was transitioned to milrinone (secondary to  shortage). History of unclear syncope vs seizures and followed by neuro and is on Keppra. On 11/15/23 underwent removal of eroded [employer] scICD--no Lifevest (due to LVAD) and no plan to replace ICD as not requiring therapy post LVAD with concern for reinfection. Reported back pain (primarily upper back pain) for the past 3-4 days. He reported that it started 2 days after stopping pirmacor and he felt it may be related. Reported some shortness of breath after walking long distances which has been ongoing for months now and has not changed lately. He has lost some weight over last few months since his  "but weight has been stable over the last month. Patient admitted after [facility] with ongoing back pain. Patient stated he has not taken his insulin in a few weeks due to running out of glucometer testing supplies. He previously had a William, but is not wearing one. BG >700 at [facility]. Endo consulted for BG management.      Interval HPI:   No acute events overnight. Patient in room 319/319 A. Blood glucose  variable . BG at, above, and below goal on current insulin regimen (SSI, prandial, and basal insulin ). Steroid use- None.    Renal function- Normal   Lab Results   Component Value Date    CREATININE 1.1 2024     Vasopressors-  None     Endocrine will continue to follow and manage insulin orders inpatient.     Diet diabetic Cardiac (Low Na/Chol); 2000 Calorie; Fluid - 1500mL; Standard Tray     Eatin%  Nausea: No  Hypoglycemia and intervention: No  Fever: No  TPN and/or TF: No  If yes, type of TF/TPN and rate: N/A    BP (!) 80/0 (BP Location: Right arm, Patient Position: Lying)   Pulse 92   Temp 98 °F (36.7 °C) (Oral)   Resp 18   Ht 6' 3" (1.905 m)   Wt 68.5 kg (151 lb 0.2 oz)   SpO2 99%   BMI 18.88 kg/m²     Labs Reviewed and Include    Recent Labs   Lab 24  0345   *   CALCIUM 9.0   *   K 3.6   CO2 26   CL 92*   BUN 19   CREATININE 1.1     Lab Results   Component Value Date    WBC 11.66 2024    HGB 8.0 (L) 2024    HCT 27.2 (L) 2024    MCV 65 (L) 2024     2024     Recent Labs   Lab 24  1058   TSH 2.009     Lab Results   Component Value Date    HGBA1C 10.3 (H) 2024       Nutritional status:   Body mass index is 18.88 kg/m².  Lab Results   Component Value Date    ALBUMIN 2.2 (L) 2024    ALBUMIN 2.4 (L) 2024    ALBUMIN 2.6 (L) 2024     Lab Results   Component Value Date    PREALBUMIN 8 (L) 2024    PREALBUMIN 7 (L) 2024    PREALBUMIN 8 (L) 2024       Estimated " Creatinine Clearance: 87.4 mL/min (based on SCr of 1.1 mg/dL).    Accu-Checks  Recent Labs     08/26/24  0655 08/26/24  0656 08/26/24  1637 08/26/24 2017 08/27/24  0140 08/27/24  0645 08/27/24  1055 08/27/24  1631 08/27/24 2003 08/28/24  0625   POCTGLUCOSE 431* 357* 204* 182* 258* 241* 369* 171* 94 279*       Current Medications and/or Treatments Impacting Glycemic Control  Immunotherapy:    Immunosuppressants       None          Steroids:   Hormones (From admission, onward)      None          Pressors:    Autonomic Drugs (From admission, onward)      None          Hyperglycemia/Diabetes Medications:   Antihyperglycemics (From admission, onward)      Start     Stop Route Frequency Ordered    08/28/24 0900  insulin glargine U-100 (Lantus) pen 30 Units         -- SubQ 2 times daily 08/28/24 0721    08/27/24 1130  insulin aspart U-100 pen 20 Units         -- SubQ 3 times daily with meals 08/27/24 0819    08/25/24 1506  insulin aspart U-100 pen 0-10 Units         -- SubQ Before meals & nightly PRN 08/25/24 1407            ASSESSMENT and PLAN    Cardiac/Vascular  * LVAD (left ventricular assist device) present  Managed by primary team  Optimize blood glucose control        CHF (NYHA class IV, ACC/AHA stage D)  Managed by primary team  Optimize blood glucose control      Endocrine  Type 2 diabetes mellitus with hyperglycemia, with long-term current use of insulin  BG goal 140-180  Noncompliant T2DM.     - Increase Lantus (Insulin Glargine) to 30 units BID (25% increase due to elevated fasting blood glucose)  - Continue Novolog (Insulin Aspart) 20 units TIDWM. Will consider increasing if patient has prandial excursions  - Continue moderate dose correction (150/25)  - BG checks AC/HS  - Hypoglycemia protocol in place    ** Please notify Endocrine for any change and/or advance in diet**  ** Please call Endocrine for any BG related issues **    Discharge Planning:   TBD. Please notify endocrinology prior to  discharge.  Patient requires glucometer and testing supplies.  Recommend he resumes his William for blood sugar monitoring.          Guera De Oliveira PA-C  Endocrinology  [facility] - Cardiology Stepdown

## 2024-08-28 NOTE — PLAN OF CARE
Problem: Adult Inpatient Plan of Care  Goal: Plan of Care Review  Outcome: Progressing  Goal: Patient-Specific Goal (Individualized)  Outcome: Progressing  Goal: Absence of Hospital-Acquired Illness or Injury  Outcome: Progressing  Goal: Optimal Comfort and Wellbeing  Outcome: Progressing  Goal: Readiness for Transition of Care  Outcome: Progressing     Problem: Diabetes Comorbidity  Goal: Blood Glucose Level Within Targeted Range  Outcome: Progressing     Problem: Ventricular Assist Device  Goal: Optimal Adjustment to Device  Outcome: Progressing  Goal: Absence of Bleeding  Outcome: Progressing  Goal: Absence of Embolism Signs and Symptoms  Outcome: Progressing  Goal: Optimal Blood Flow  Outcome: Progressing  Goal: Absence of Infection Signs and Symptoms  Outcome: Progressing  Goal: Effective Right-Sided Heart Function  Outcome: Progressing

## 2024-08-28 NOTE — ASSESSMENT & PLAN NOTE
pt previously went into atach vs aflutter. Pt was started on amiodarone, with rhythm aborted to NSR following bolus. BP remained stable.   -BMP, replace lytes as needed   -cont amiodarone- will complete load and keep on maintenance dosing thereafter  -pt does report intermittently at home having episodes of palpitations that may also represent paroxysmal afib vs flutter at home. Already on a/c. Chadvasc of 5

## 2024-08-29 PROBLEM — R65.20 SEVERE SEPSIS: Status: ACTIVE | Noted: 2024-08-27

## 2024-08-29 PROBLEM — A41.9 SEVERE SEPSIS: Status: ACTIVE | Noted: 2024-08-27

## 2024-08-29 LAB
ALLENS TEST: ABNORMAL
ANION GAP SERPL CALC-SCNC: 10 MMOL/L (ref 8–16)
APTT PPP: 39.8 SEC (ref 21–32)
BACTERIA SPEC AEROBE CULT: ABNORMAL
BASOPHILS # BLD AUTO: 0.02 K/UL (ref 0–0.2)
BASOPHILS NFR BLD: 0.2 % (ref 0–1.9)
BUN SERPL-MCNC: 20 MG/DL (ref 6–20)
CALCIUM SERPL-MCNC: 9.2 MG/DL (ref 8.7–10.5)
CHLORIDE SERPL-SCNC: 93 MMOL/L (ref 95–110)
CO2 SERPL-SCNC: 26 MMOL/L (ref 23–29)
CREAT SERPL-MCNC: 1.1 MG/DL (ref 0.5–1.4)
DELSYS: ABNORMAL
DIFFERENTIAL METHOD BLD: ABNORMAL
EOSINOPHIL # BLD AUTO: 0.1 K/UL (ref 0–0.5)
EOSINOPHIL NFR BLD: 0.7 % (ref 0–8)
ERYTHROCYTE [DISTWIDTH] IN BLOOD BY AUTOMATED COUNT: 28.5 % (ref 11.5–14.5)
EST. GFR  (NO RACE VARIABLE): >60 ML/MIN/1.73 M^2
FIO2: 21
GLUCOSE SERPL-MCNC: 170 MG/DL (ref 70–110)
HCT VFR BLD AUTO: 27.2 % (ref 40–54)
HGB BLD-MCNC: 7.8 G/DL (ref 14–18)
IMM GRANULOCYTES # BLD AUTO: 0.04 K/UL (ref 0–0.04)
IMM GRANULOCYTES NFR BLD AUTO: 0.4 % (ref 0–0.5)
INR PPP: 2.5 (ref 0.8–1.2)
LDH SERPL L TO P-CCNC: 288 U/L (ref 110–260)
LYMPHOCYTES # BLD AUTO: 1.4 K/UL (ref 1–4.8)
LYMPHOCYTES NFR BLD: 14 % (ref 18–48)
MAGNESIUM SERPL-MCNC: 1.8 MG/DL (ref 1.6–2.6)
MCH RBC QN AUTO: 19.5 PG (ref 27–31)
MCHC RBC AUTO-ENTMCNC: 28.7 G/DL (ref 32–36)
MCV RBC AUTO: 68 FL (ref 82–98)
MODE: ABNORMAL
MONOCYTES # BLD AUTO: 1.1 K/UL (ref 0.3–1)
MONOCYTES NFR BLD: 10.7 % (ref 4–15)
MRSA ID BY PCR: NEGATIVE
NEUTROPHILS # BLD AUTO: 7.5 K/UL (ref 1.8–7.7)
NEUTROPHILS NFR BLD: 74 % (ref 38–73)
NRBC BLD-RTO: 0 /100 WBC
OHS QRS DURATION: 204 MS
OHS QTC CALCULATION: 518 MS
PHOSPHATE SERPL-MCNC: 4.2 MG/DL (ref 2.7–4.5)
PLATELET # BLD AUTO: 335 K/UL (ref 150–450)
PMV BLD AUTO: ABNORMAL FL (ref 9.2–12.9)
PO2 BLDA: 30 MMHG (ref 40–60)
POC SATURATED O2: 48 % (ref 95–100)
POCT GLUCOSE: 146 MG/DL (ref 70–110)
POCT GLUCOSE: 238 MG/DL (ref 70–110)
POCT GLUCOSE: 256 MG/DL (ref 70–110)
POCT GLUCOSE: 317 MG/DL (ref 70–110)
POCT GLUCOSE: 348 MG/DL (ref 70–110)
POCT GLUCOSE: 61 MG/DL (ref 70–110)
POTASSIUM SERPL-SCNC: 3.6 MMOL/L (ref 3.5–5.1)
PROTHROMBIN TIME: 25.4 SEC (ref 9–12.5)
RBC # BLD AUTO: 4.01 M/UL (ref 4.6–6.2)
SAMPLE: ABNORMAL
SITE: ABNORMAL
SODIUM SERPL-SCNC: 129 MMOL/L (ref 136–145)
STAPH AUREUS ID BY PCR: POSITIVE
WBC # BLD AUTO: 10.09 K/UL (ref 3.9–12.7)

## 2024-08-29 PROCEDURE — 20600001 HC STEP DOWN PRIVATE ROOM

## 2024-08-29 PROCEDURE — 94761 N-INVAS EAR/PLS OXIMETRY MLT: CPT | Mod: XB

## 2024-08-29 PROCEDURE — 85025 COMPLETE CBC W/AUTO DIFF WBC: CPT | Performed by: STUDENT IN AN ORGANIZED HEALTH CARE EDUCATION/TRAINING PROGRAM

## 2024-08-29 PROCEDURE — 85730 THROMBOPLASTIN TIME PARTIAL: CPT | Performed by: STUDENT IN AN ORGANIZED HEALTH CARE EDUCATION/TRAINING PROGRAM

## 2024-08-29 PROCEDURE — 99232 SBSQ HOSP IP/OBS MODERATE 35: CPT | Mod: ,,, | Performed by: NURSE PRACTITIONER

## 2024-08-29 PROCEDURE — 25000003 PHARM REV CODE 250: Performed by: STUDENT IN AN ORGANIZED HEALTH CARE EDUCATION/TRAINING PROGRAM

## 2024-08-29 PROCEDURE — 83735 ASSAY OF MAGNESIUM: CPT | Performed by: STUDENT IN AN ORGANIZED HEALTH CARE EDUCATION/TRAINING PROGRAM

## 2024-08-29 PROCEDURE — 63600175 PHARM REV CODE 636 W HCPCS: Performed by: STUDENT IN AN ORGANIZED HEALTH CARE EDUCATION/TRAINING PROGRAM

## 2024-08-29 PROCEDURE — 25000003 PHARM REV CODE 250: Performed by: INTERNAL MEDICINE

## 2024-08-29 PROCEDURE — 80048 BASIC METABOLIC PNL TOTAL CA: CPT | Performed by: STUDENT IN AN ORGANIZED HEALTH CARE EDUCATION/TRAINING PROGRAM

## 2024-08-29 PROCEDURE — 63600175 PHARM REV CODE 636 W HCPCS

## 2024-08-29 PROCEDURE — 99900035 HC TECH TIME PER 15 MIN (STAT)

## 2024-08-29 PROCEDURE — 93750 INTERROGATION VAD IN PERSON: CPT | Mod: ,,, | Performed by: INTERNAL MEDICINE

## 2024-08-29 PROCEDURE — 27000248 HC VAD-ADDITIONAL DAY

## 2024-08-29 PROCEDURE — 99233 SBSQ HOSP IP/OBS HIGH 50: CPT | Mod: ,,, | Performed by: STUDENT IN AN ORGANIZED HEALTH CARE EDUCATION/TRAINING PROGRAM

## 2024-08-29 PROCEDURE — 83615 LACTATE (LD) (LDH) ENZYME: CPT | Performed by: STUDENT IN AN ORGANIZED HEALTH CARE EDUCATION/TRAINING PROGRAM

## 2024-08-29 PROCEDURE — 84100 ASSAY OF PHOSPHORUS: CPT | Performed by: STUDENT IN AN ORGANIZED HEALTH CARE EDUCATION/TRAINING PROGRAM

## 2024-08-29 PROCEDURE — 82803 BLOOD GASES ANY COMBINATION: CPT

## 2024-08-29 PROCEDURE — 85610 PROTHROMBIN TIME: CPT | Performed by: STUDENT IN AN ORGANIZED HEALTH CARE EDUCATION/TRAINING PROGRAM

## 2024-08-29 PROCEDURE — 99233 SBSQ HOSP IP/OBS HIGH 50: CPT | Mod: ,,, | Performed by: INTERNAL MEDICINE

## 2024-08-29 PROCEDURE — 25000003 PHARM REV CODE 250

## 2024-08-29 RX ORDER — POTASSIUM CHLORIDE 20 MEQ/1
40 TABLET, EXTENDED RELEASE ORAL ONCE
Status: COMPLETED | OUTPATIENT
Start: 2024-08-29 | End: 2024-08-29

## 2024-08-29 RX ADMIN — POTASSIUM CHLORIDE 40 MEQ: 1500 TABLET, EXTENDED RELEASE ORAL at 08:08

## 2024-08-29 RX ADMIN — ATORVASTATIN CALCIUM 40 MG: 20 TABLET, FILM COATED ORAL at 08:08

## 2024-08-29 RX ADMIN — LEVETIRACETAM 1500 MG: 500 TABLET, FILM COATED ORAL at 08:08

## 2024-08-29 RX ADMIN — CYCLOBENZAPRINE HYDROCHLORIDE 5 MG: 5 TABLET, FILM COATED ORAL at 11:08

## 2024-08-29 RX ADMIN — INSULIN ASPART 20 UNITS: 100 INJECTION, SOLUTION INTRAVENOUS; SUBCUTANEOUS at 11:08

## 2024-08-29 RX ADMIN — WARFARIN SODIUM 2 MG: 2 TABLET ORAL at 04:08

## 2024-08-29 RX ADMIN — ACETAMINOPHEN 650 MG: 325 TABLET ORAL at 04:08

## 2024-08-29 RX ADMIN — CEFAZOLIN 2 G: 2 INJECTION, POWDER, FOR SOLUTION INTRAMUSCULAR; INTRAVENOUS at 11:08

## 2024-08-29 RX ADMIN — GABAPENTIN 100 MG: 100 CAPSULE ORAL at 08:08

## 2024-08-29 RX ADMIN — ASPIRIN 81 MG: 81 TABLET, COATED ORAL at 08:08

## 2024-08-29 RX ADMIN — BUMETANIDE 2 MG: 1 TABLET ORAL at 04:08

## 2024-08-29 RX ADMIN — BUMETANIDE 2 MG: 1 TABLET ORAL at 08:08

## 2024-08-29 RX ADMIN — GABAPENTIN 100 MG: 100 CAPSULE ORAL at 11:08

## 2024-08-29 RX ADMIN — GABAPENTIN 400 MG: 400 CAPSULE ORAL at 08:08

## 2024-08-29 RX ADMIN — AMLODIPINE BESYLATE 5 MG: 5 TABLET ORAL at 08:08

## 2024-08-29 RX ADMIN — INSULIN GLARGINE 30 UNITS: 100 INJECTION, SOLUTION SUBCUTANEOUS at 08:08

## 2024-08-29 RX ADMIN — AMIODARONE HYDROCHLORIDE 400 MG: 200 TABLET ORAL at 08:08

## 2024-08-29 RX ADMIN — DULOXETINE HYDROCHLORIDE 30 MG: 30 CAPSULE, DELAYED RELEASE ORAL at 08:08

## 2024-08-29 RX ADMIN — CEFAZOLIN 2 G: 2 INJECTION, POWDER, FOR SOLUTION INTRAMUSCULAR; INTRAVENOUS at 08:08

## 2024-08-29 RX ADMIN — Medication 400 MG: at 08:08

## 2024-08-29 RX ADMIN — INSULIN ASPART 20 UNITS: 100 INJECTION, SOLUTION INTRAVENOUS; SUBCUTANEOUS at 08:08

## 2024-08-29 RX ADMIN — EPLERENONE 25 MG: 25 TABLET, FILM COATED ORAL at 08:08

## 2024-08-29 RX ADMIN — CEFEPIME 1 G: 1 INJECTION, POWDER, FOR SOLUTION INTRAMUSCULAR; INTRAVENOUS at 04:08

## 2024-08-29 RX ADMIN — ACETAMINOPHEN 650 MG: 325 TABLET ORAL at 07:08

## 2024-08-29 RX ADMIN — PANTOPRAZOLE SODIUM 40 MG: 40 TABLET, DELAYED RELEASE ORAL at 08:08

## 2024-08-29 RX ADMIN — INSULIN ASPART 4 UNITS: 100 INJECTION, SOLUTION INTRAVENOUS; SUBCUTANEOUS at 08:08

## 2024-08-29 NOTE — SUBJECTIVE & OBJECTIVE
"Interval HPI:   Overnight events:  NAEON. Remains in C[facility]. BG variable on current SQ insulin regimen. Diet diabetic Cardiac (Low Na/Chol); 2000 Calorie; Fluid - 1500mL; Standard Tray    Eatin%  Nausea: No  Hypoglycemia and intervention: No  Fever: No  TPN and/or TF: No  If yes, type of TF/TPN and rate: No    BP (!) 82/0 (BP Location: Right arm, Patient Position: Lying)   Pulse 61   Temp 97.5 °F (36.4 °C) (Oral)   Resp 18   Ht 6' 3" (1.905 m)   Wt 68.5 kg (151 lb 0.2 oz)   SpO2 100%   BMI 18.88 kg/m²     Labs Reviewed and Include    Recent Labs   Lab 24  0346   *   CALCIUM 9.2   *   K 3.6   CO2 26   CL 93*   BUN 20   CREATININE 1.1     Lab Results   Component Value Date    WBC 10.09 2024    HGB 7.8 (L) 2024    HCT 27.2 (L) 2024    MCV 68 (L) 2024     2024     Recent Labs   Lab 24  1058   TSH 2.009     Lab Results   Component Value Date    HGBA1C 10.3 (H) 2024       Nutritional status:   Body mass index is 18.88 kg/m².  Lab Results   Component Value Date    ALBUMIN 2.4 (L) 2024    ALBUMIN 2.2 (L) 2024    ALBUMIN 2.4 (L) 2024     Lab Results   Component Value Date    PREALBUMIN 8 (L) 2024    PREALBUMIN 7 (L) 2024    PREALBUMIN 8 (L) 2024       Estimated Creatinine Clearance: 87.4 mL/min (based on SCr of 1.1 mg/dL).    Accu-Checks  Recent Labs     24  0140 24  0645 24  1055 24  1631 24  2003 24  0625 24  1056 24  1720 24  1942 24  0609   POCTGLUCOSE 258* 241* 369* 171* 94 279* 432* 70 129* 238*       Current Medications and/or Treatments Impacting Glycemic Control  Immunotherapy:    Immunosuppressants       None          Steroids:   Hormones (From admission, onward)      None          Pressors:    Autonomic Drugs (From admission, onward)      None          Hyperglycemia/Diabetes Medications:   Antihyperglycemics (From admission, onward)      " Start     Stop Route Frequency Ordered    08/28/24 0900  insulin glargine U-100 (Lantus) pen 30 Units         -- SubQ 2 times daily 08/28/24 0721    08/27/24 1130  insulin aspart U-100 pen 20 Units         -- SubQ 3 times daily with meals 08/27/24 0819    08/25/24 1506  insulin aspart U-100 pen 0-10 Units         -- SubQ Before meals & nightly PRN 08/25/24 1407

## 2024-08-29 NOTE — PROGRESS NOTES
[facility] - Cardiology Stepdown  Heart Transplant  Progress Note    Patient Name: Kevan Queen  MRN: [id number]  Admission Date: 8/25/2024  Hospital Length of Stay: 4 days  Attending Physician: Dalia Crum MD  Primary Care Provider: Rosio Armendariz FNP  Principal Problem:LVAD (left ventricular assist device) present    Subjective:     Interval History: Pt doing well overnight, denies any denies any chest pain, shortness of breath, lightheadedness, dizziness, fevers or chills overnight. Cont to have back pain and notably has r sided cva tenderness. No overlying skin changes, no wbc count overnight. D/w IR, no need for drain placement at this time and risk outweighs benefit given INR/on therapeutic a/c for VAD.    Blood cultures are positive for staphylococcus aureus vs micrococcus- MRSA swab negative. Will remove PICC     Responding well to 2 mg of Bumex for diuresis, no issue any allergic reaction.  Kidney function at baseline and euvolemic/ no JVD. No abdominal distension, abdomen soft. Pt to have cont palliative care discussions regarding goals, pt educated on need for compliance. Will plan to have joint GOC conversation today or tomorrow as well.       Continuous Infusions: none       Scheduled Meds:   [START ON 9/9/2024] amiodarone  200 mg Oral Daily    amiodarone  400 mg Oral BID    amLODIPine  5 mg Oral Daily    aspirin  81 mg Oral Daily    atorvastatin  40 mg Oral Daily    bumetanide  2 mg Oral BID loop    ceFAZolin (Ancef) IV (PEDS and ADULTS)  2 g Intravenous Q8H    DULoxetine  30 mg Oral Daily    eplerenone  25 mg Oral Daily    gabapentin  100 mg Oral BID    gabapentin  400 mg Oral QHS    insulin aspart U-100  20 Units Subcutaneous TIDWM    insulin glargine U-100  30 Units Subcutaneous BID    levETIRAcetam  1,500 mg Oral BID    LIDOcaine  2 patch Transdermal Q24H    magnesium oxide  400 mg Oral Daily    nicotine  1 patch Transdermal Daily    pantoprazole  40 mg Oral Daily    warfarin  2 mg  Oral Daily     PRN Meds:  Current Facility-Administered Medications:     acetaminophen, 650 mg, Oral, Q6H PRN    cyclobenzaprine, 5 mg, Oral, Daily PRN    dextrose 10%, 12.5 g, Intravenous, PRN    dextrose 10%, 25 g, Intravenous, PRN    glucagon (human recombinant), 1 mg, Intramuscular, PRN    glucose, 16 g, Oral, PRN    glucose, 24 g, Oral, PRN    insulin aspart U-100, 0-10 Units, Subcutaneous, QID (AC + HS) PRN    ondansetron, 4 mg, Oral, Q8H PRN    Review of patient's allergies indicates:   Allergen Reactions    Torsemide Hives     Objective:     Vital Signs (Most Recent):  Temp: 97.6 °F (36.4 °C) (08/29/24 1129)  Pulse: 100 (08/29/24 1400)  Resp: 18 (08/29/24 1129)  BP: (!) 72/0 (08/29/24 1206)  SpO2: 98 % (08/29/24 1129) Vital Signs (24h Range):  Temp:  [97.5 °F (36.4 °C)-98.9 °F (37.2 °C)] 97.6 °F (36.4 °C)  Pulse:  [] 100  Resp:  [18] 18  SpO2:  [98 %-100 %] 98 %  BP: ()/(0-74) 72/0     Patient Vitals for the past 72 hrs (Last 3 readings):   Weight   08/28/24 0330 68.5 kg (151 lb 0.2 oz)   08/27/24 0415 68.9 kg (151 lb 14.4 oz)   08/26/24 1516 70.8 kg (156 lb 1.4 oz)     Body mass index is 18.88 kg/m².      Intake/Output Summary (Last 24 hours) at 8/29/2024 1432  Last data filed at 8/29/2024 1209  Gross per 24 hour   Intake 1012 ml   Output 3725 ml   Net -2713 ml       Hemodynamic Parameters:  CVP:  [16 mmHg] 16 mmHg    EKG- without ischemic changes this AM        Physical Exam  Constitutional:       General: He is not in acute distress.     Appearance: He is normal weight.   HENT:      Head: Normocephalic and atraumatic.      Right Ear: External ear normal.      Left Ear: External ear normal.      Nose: Nose normal.      Mouth/Throat:      Pharynx: Oropharynx is clear.   Cardiovascular:      Comments: Normal VAD hum, JVD present but improving   Pulmonary:      Effort: Pulmonary effort is normal.   Abdominal:      General: Abdomen is flat. Bowel sounds are normal. There is no distension (Distended  but soft.).      Palpations: Abdomen is soft. There is no mass.      Tenderness: There is no abdominal tenderness. There is right CVA tenderness.   Musculoskeletal:      Cervical back: Normal range of motion.      Right lower leg: No edema.      Left lower leg: No edema.      Comments: Cont b/l back pain- upper spine and b/l paraspinal/upper back muscular pain.    Skin:     General: Skin is warm.      Comments: Chest wall on the right side with gynecomastia, no overlying skin changes, no tenderness at the site however palpable mass versus nodule present.   Neurological:      Mental Status: He is alert. Mental status is at baseline.            Significant Labs:  CBC:  Recent Labs   Lab 08/27/24  1443 08/28/24  0345 08/29/24  0346   WBC 10.43 11.66 10.09   RBC 4.38* 4.21* 4.01*   HGB 8.3* 8.0* 7.8*   HCT 29.2* 27.2* 27.2*    309 335   MCV 67* 65* 68*   MCH 18.9* 19.0* 19.5*   MCHC 28.4* 29.4* 28.7*     BNP:  Recent Labs   Lab 08/25/24  1150 08/26/24  0508 08/28/24  0345   * 461* 376*     CMP:  Recent Labs   Lab 08/25/24  1150 08/25/24  2029 08/26/24  0508 08/26/24  2221 08/27/24  0530 08/28/24  0345 08/29/24  0346   *   < > 374*   < > 161* 219* 170*   CALCIUM 8.8   < > 8.8   < > 8.9 9.0 9.2   ALBUMIN 2.4*  --  2.2*  --   --  2.4*  --    PROT 7.9  --  7.4  --   --  7.6  --    *   < > 130*   < > 130* 131* 129*   K 2.7*   < > 4.0   < > 4.1 3.6 3.6   CO2 31*   < > 29   < > 28 26 26   CL 92*   < > 94*   < > 92* 92* 93*   BUN 11   < > 13   < > 14 19 20   CREATININE 1.0   < > 1.0   < > 1.0 1.1 1.1   ALKPHOS 207*  --  199*  --   --  215*  --    ALT 14  --  13  --   --  13  --    AST 24  --  23  --   --  24  --    BILITOT 2.3*  --  2.2*  --   --  2.4*  --     < > = values in this interval not displayed.      Coagulation:   Recent Labs   Lab 08/27/24  0530 08/28/24 0345 08/29/24 0346   INR 2.7* 2.3* 2.5*   APTT 51.1* 43.1* 39.8*     LDH:  Recent Labs   Lab 08/27/24  0530 08/28/24 0345  08/29/24  0346   * 326* 288*     Microbiology:  Microbiology Results (last 7 days)       Procedure Component Value Units Date/Time    Aerobic culture [[id number]]  (Abnormal)  (Susceptibility) Collected: 08/27/24 1446    Order Status: Completed Specimen: Skin from Abdomen Updated: 08/29/24 1424     Aerobic Bacterial Culture STAPHYLOCOCCUS AUREUS  Rare      Narrative:      Driveline site    Blood culture [[id number]] Collected: 08/27/24 1751    Order Status: Completed Specimen: Blood Updated: 08/29/24 1350     Blood Culture, Routine Gram stain dudley bottle: Gram positive cocci in clusters resembling Staph      Results called to and read back by: Lorri Riggs RN  08/29/2024   13:50    Narrative:      Blood culture # 2 different location.    Blood culture [[id number]] Collected: 08/28/24 2226    Order Status: Completed Specimen: Blood Updated: 08/29/24 1145     Blood Culture, Routine No Growth to date    Narrative:      Code 83395  Draw sample # 2 from separate site    Blood culture [[id number]] Collected: 08/28/24 2226    Order Status: Completed Specimen: Blood Updated: 08/29/24 1145     Blood Culture, Routine No Growth to date    Narrative:      Code 30498  Draw sample # 2 from separate site    Blood culture [[id number]]  (Abnormal) Collected: 08/27/24 1802    Order Status: Completed Specimen: Blood Updated: 08/29/24 1014     Blood Culture, Routine Gram stain aer bottle: Gram positive cocci in clusters resembling Staph      Results called to and read back by:Miguelina Thomas RN 08/28/2024  22:59      MICROCOCCUS SPECIES  Organism is a probable contaminant      Culture, Anaerobe [[id number]] Collected: 08/27/24 1446    Order Status: Completed Specimen: Skin from Abdomen Updated: 08/29/24 0946     Anaerobic Culture Culture in progress    Narrative:      Driveline site    MRSA/SA Rapid ID by PCR from Blood culture [[id number]]  (Abnormal) Collected: 08/27/24 1802    Order Status: Completed Updated: 08/29/24 0027      Staph aureus ID by PCR Positive     Methicillin Resistant ID by PCR Negative    Culture, Anaerobe [[id number]] Collected: 08/25/24 1733    Order Status: Completed Specimen: Wound from Abdomen Updated: 08/28/24 1010     Anaerobic Culture Culture in progress    Narrative:      Drive line exit site    Blood culture [[id number]]     Order Status: Canceled Specimen: Blood     Blood culture [[id number]]     Order Status: Canceled Specimen: Blood     Aerobic culture [[id number]] Collected: 08/25/24 1733    Order Status: Sent Specimen: Wound from Abdomen Updated: 08/25/24 1734            I have reviewed all pertinent labs within the past 24 hours.    Estimated Creatinine Clearance: 87.4 mL/min (based on SCr of 1.1 mg/dL).    Diagnostic Results:  I have reviewed and interpreted all pertinent imaging results/findings within the past 24 hours.  Assessment and Plan:     Mr. Kevan Thacker is a 39 year old male with stage D 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 He underwent RVAD removal and chest closure 6/30/2022.  He was weaned off  but he had to restarted due to RVF. He was eventually transitioned to milrinone (secondary to  shortage) now supposed to be on 0.25 mcg/kg/min. He has a history of unclear syncope vs seizures and is followed by neuro for this and is on Keppra.  He is being admitted after being out of [facility] for 1 week with ongoing back pain.      On 11/15/23 underwent removal of eroded [employer] scICD--no Lifevest (due to LVAD) and no plan to replace ICD as not requiring therapy post LVAD with concern for reinfection.  He has not had neurology f/u with seizure hx on keppra so coordinator to assist him with obtaining appt.      Reports back pain (primarily upper back  pain) for the past 3-4 days. He reports that it started 2 days after stopping pirmacor and he feels it may be related. He has also been sleeping in the couch and identifies that it may also be related to the back pain. He denies lifting anything heavy or any falls /trauma. No red flags including incontinence of stool or urine. No numbness in the groin area or weakness in the legs or upper extremity reported. He reports he was not able to go for appointment because of transport issue that has now been resolved. He denies orthopnea, PND, weight gain, leg swelling. He says that he does have some shortness of breath after walking long distances which has been ongoing for months now and has not changed lately. He has lost some weight over last few months since his but weight has been stable over the last month. He denied headache, constipation, diarrhea, SOB, cough, palpitations or any additional symptoms.     * LVAD (left ventricular assist device) present  -HeartMate 3 Implanted  6/29/2022  as DT  -HTS Primary  -cont coumadin, Goal INR 2.0-3.0.   Lab Results   Component Value Date    INR 2.3 (H) 08/28/2024    INR 2.7 (H) 08/27/2024    INR 4.3 (H) 08/26/2024       -Antiplatelets ASA 81 mg  -LDH is stable overall today. Will continue to monitor daily.  -Speed set at 5100, LSL 4700 rpm  -Interrogation notable for  power cable disconnected, low voltage advisory, PI events  -Not listed for OHTx- advised pt he would not be candidate for OHTx (noncompliance/poor f/up)   -UDS negative  -attempted to call partner Robyn, unfortunately goes to        Procedure: Device Interrogation Including analysis of device parameters  Current Settings: Ventricular Assist Device  Review of device function is stable/unstable stable        8/28/2024     8:08 AM 8/28/2024     3:34 AM 8/27/2024    11:51 PM 8/27/2024     7:37 PM 8/27/2024     3:59 PM 8/27/2024    11:54 AM 8/27/2024     8:06 AM   TXP LVAD INTERROGATIONS   Type HeartMate3  HeartMate3 HeartMate3 HeartMate3 HeartMate3 HeartMate3 HeartMate3   Flow 4.2 3.7 3.8 4 3.6 4.1 3.7   Speed 5100 5100 5100 5100 5100 5100 5100   PI 5 6 6.2 5.4 6.1 5.3 5.4   Power (Serna) 3.7 3.5 3.5 3.5 3.6 3.6 3.6   LSL  4700 4700 4700  4700 4700   Pulsatility  Intermittent pulse Intermittent pulse Intermittent pulse            Severe sepsis  -CT chest/abdomen and pelvis w/ contrast w/ pyelonephritis and renal abscess   -WBC downtrending/normalized, likely a reaction to ovn episode of tachycardia.   -f/up cultures- +ve for staph, neg MRSA pcr   -ID following-initiating cefazolin   -remove PICC order placed     CHF (NYHA class IV, ACC/AHA stage D)    Updated 8/26/24:  EF 5-10% global hypokinesis, RV enlargement and global hypokinesis, biatrial enlargement, mild-to-moderate MR, severe TR, no van HeartMate 3 in place, aortic valve does not open, LVEDd 7.2  Previous TTE 9/5/23 with EF 10-15%, mod to sever TR, LVEDd 7.11, LVAD - HM3 in place    -anticipate potential discharge Friday   -continue on bumex 2mg BID   -GDMT: d/c spironolactone given possible gynecomastia, transition to epleronone   -Previously on home Primacor 0.25 at home but ran out approx 5 days prior to admission and also reports he occasionally disconnects himself from his pump to perform certain activities. Will plan to d/c off primacor   - TTE With LVEF 5-10%, poor RV function, dilation   -JVD improving with bumex 2mg   -palliative care c/s and pt discussed with service, appreciate f/up- plan for continued follow up with palliative outpatient as well with Dr. Carr       Atrial flutter  pt previously went into atach vs aflutter. Pt was started on amiodarone, with rhythm aborted to NSR following bolus. BP remained stable.   -BMP, replace lytes as needed   -cont amiodarone- will complete load and keep on maintenance dosing thereafter  -pt does report intermittently at home having episodes of palpitations that may also represent paroxysmal afib vs flutter  at home. Already on a/c. Chadvasc of 5          Subcutaneous nodule of breast  Pt with unilateral R sided gynecomastia with hard nodular feeling beneath soft subcutaneous tissue of breast. No notable skin dimpling or rash.   -CT w/out contrast and US w/ likely gynecomastia, unable to r/out abscess, will get ct with contrast. New lymph node swelling seen on CT scan compared to old CTs.  Will need mammogram outpatient (discussed with pt).   -transition from spironolactone to epleronone     Supratherapeutic INR  -goal INR 2-3 , INR 4.7 on admission  - pharmacy  to manage coumadin dosing, cont warfarin   Check INR daily       Bilateral back pain  Pt reports initial pain following running out of primacor. Does have intermittent chest pain/pressure with straining on the toilet as well but no radiation of pain or issues with nausea/vomiting/diaphoresis/sob with symptoms. S/p spinal Xrays without significant abnormality.   NO red flag symptoms on admission  C/w home gabapentin, added tylenol prn and lidocaine patches   -CT T/A/P with contrast pending, does seem to be MSK pain given not isolated to spine/is also paraspinal and extends b/l to muscles in upper back. Discussed pts DJD on Spinal x rays as well.      Polysubstance abuse  Pt has history of polysubstance use.   -UDS ordered given noncompliance      Hypokalemia  2.7 on admission  Replace and monitor      Type 2 diabetes mellitus with hyperglycemia, with long-term current use of insulin  A1C 10.3  Endocrinology consulted for glucose management, largely uncontrolled       Lab Results   Component Value Date    HGBA1C 10.3 (H) 08/25/2024    HGBA1C >14.0 (H) 04/26/2024    HGBA1C >14.0 (H) 02/17/2024    [id number] 7.5 06/12/2023    [id number] 8.5 02/07/2023           Moderate malnutrition  -pt with aggressive care goals  -c/s nutrition, appreciate assistance     Seizure-like activity  C/w home keppra 1.5 gm BID        Alice Ayala MD  Heart Transplant  [facility] -  Cardiology Stepdown

## 2024-08-29 NOTE — PROGRESS NOTES
"LVAD dressing change completed using sterile technique with chlorhexidine kit. DLES is a "2" with moderate serous drainage noted on the drain sponge. Slight tenderness reported while cleaning the DLES with chlorhexidine. Tolerated without any complication.        08/29/24 1257        VAD 06/29/22 1115 Left ventricular assist device HeartMate 3   Placement Date/Time: 06/29/22 1115   Present Prior to Hospital Arrival?: No  Inserted by: MD  VAD Type: Left ventricular assist device  VAD Brand: HeartMate 3   Site Location Abdomen right   Site Assessment Clean;Intact;Draining;Tender   Driveline Exit Site 2   Driveline Assessment Free of Kinks;Intact   Dressing Status Clean;Dry;Intact   Dressing Intervention Sterile dressing change   Performed By RN   Dressing Change Schedule Daily   Dressing Change Due 08/30/24   Integrity dry;intact   Driveline Prentiss in use Tape   Condition CDI   Date changed 08/29/24   Post Removal Complications None       "

## 2024-08-29 NOTE — NURSING
Notified Dr. Alba of patient's gram positive cocci cluster resembling staf from the culture drawn yesterday. No further orders given, care is ongoing.

## 2024-08-29 NOTE — ASSESSMENT & PLAN NOTE
-CT chest/abdomen and pelvis w/ contrast w/ pyelonephritis and renal abscess   -WBC downtrending/normalized, likely a reaction to ovn episode of tachycardia.   -f/up cultures- +ve for staph, neg MRSA pcr   -ID following-initiating cefazolin   -remove PICC order placed

## 2024-08-29 NOTE — PROGRESS NOTES
08/29/2024  John Alaniz    Current provider:  Dalia Crum MD    Device interrogation:      8/29/2024     3:40 AM 8/28/2024    11:45 PM 8/28/2024     8:07 PM 8/28/2024     5:00 PM 8/28/2024    12:00 PM 8/28/2024     8:08 AM 8/28/2024     3:34 AM   TXP LVAD INTERROGATIONS   Type HeartMate3 HeartMate3 HeartMate3  HeartMate3 HeartMate3 HeartMate3   Flow 3.6 3.8 4.2 4.4 4.2 4.2 3.7   Speed 5100 5150 5150 5100 5100 5100 5100   PI 5.8 5.4 4.4 4.7 4.8 5 6   Power (Serna) 3.6 3.6 3.5 3.7 3.8 3.7 3.5   LSL 4700 4700 4700    4700   Pulsatility Intermittent pulse Intermittent pulse Intermittent pulse    Intermittent pulse          Rounded on Kevan Queen to ensure all mechanical assist device settings (IABP or VAD) were appropriate and all parameters were within limits.  I was able to ensure all back up equipment was present, the staff had no issues, and the Perfusion Department daily rounding was complete.      For implantable VADs: Interrogation of Ventricular assist device was performed with analysis of device parameters and review of device function. I have personally reviewed the interrogation findings and agree with findings as stated.     In emergency, the nursing units have been notified to contact the perfusion department either by:  Calling [phone] from 630am to 4pm Mon thru Fri, utilizing the On-Call Finder functionality of Epic and searching for Perfusion, or by contacting the hospital  from 4pm to 630am and on weekends and asking to speak with the perfusionist on call.    7:16 AM

## 2024-08-29 NOTE — SUBJECTIVE & OBJECTIVE
Interval History: No fevers documented overnight.   Reported worsening L flank pain overnight. States back pain is getting better.     Review of Systems   Constitutional:  Negative for chills and fever.   Genitourinary:  Positive for flank pain. Negative for difficulty urinating and dysuria.   Musculoskeletal:  Positive for back pain (improving).   All other systems reviewed and are negative.    Objective:     Vital Signs (Most Recent):  Temp: 97.6 °F (36.4 °C) (08/29/24 1129)  Pulse: 100 (08/29/24 1400)  Resp: 18 (08/29/24 1129)  BP: (!) 72/0 (08/29/24 1206)  SpO2: 98 % (08/29/24 1129) Vital Signs (24h Range):  Temp:  [97.5 °F (36.4 °C)-98.9 °F (37.2 °C)] 97.6 °F (36.4 °C)  Pulse:  [] 100  Resp:  [18] 18  SpO2:  [98 %-100 %] 98 %  BP: ()/(0-74) 72/0     Weight: 68.5 kg (151 lb 0.2 oz)  Body mass index is 18.88 kg/m².    Estimated Creatinine Clearance: 87.4 mL/min (based on SCr of 1.1 mg/dL).     Physical Exam  Constitutional:       General: He is not in acute distress.     Appearance: He is not ill-appearing or toxic-appearing.   Eyes:      General:         Right eye: No discharge.         Left eye: No discharge.   Pulmonary:      Effort: Pulmonary effort is normal. No respiratory distress.   Abdominal:      General: There is no distension.      Palpations: Abdomen is soft.      Tenderness: There is left CVA tenderness. There is no right CVA tenderness.      Comments: LVAD dressed   Musculoskeletal:         General: No tenderness (no spinal tenderness).   Skin:     General: Skin is warm and dry.      Comments: L picc   Neurological:      Mental Status: He is alert and oriented to person, place, and time.          Significant Labs:   Microbiology Results (last 7 days)       Procedure Component Value Units Date/Time    Aerobic culture [[id number]]  (Abnormal)  (Susceptibility) Collected: 08/27/24 1446    Order Status: Completed Specimen: Skin from Abdomen Updated: 08/29/24 1421     Aerobic Bacterial  Culture STAPHYLOCOCCUS AUREUS  Rare      Narrative:      Driveline site    Blood culture [[id number]] Collected: 08/27/24 1751    Order Status: Completed Specimen: Blood Updated: 08/29/24 1350     Blood Culture, Routine Gram stain dudley bottle: Gram positive cocci in clusters resembling Staph      Results called to and read back by: Lorri Riggs RN  08/29/2024   13:50    Narrative:      Blood culture # 2 different location.    Blood culture [[id number]] Collected: 08/28/24 2226    Order Status: Completed Specimen: Blood Updated: 08/29/24 1145     Blood Culture, Routine No Growth to date    Narrative:      Code 94565  Draw sample # 2 from separate site    Blood culture [[id number]] Collected: 08/28/24 2226    Order Status: Completed Specimen: Blood Updated: 08/29/24 1145     Blood Culture, Routine No Growth to date    Narrative:      Code 56287  Draw sample # 2 from separate site    Blood culture [[id number]]  (Abnormal) Collected: 08/27/24 1802    Order Status: Completed Specimen: Blood Updated: 08/29/24 1014     Blood Culture, Routine Gram stain aer bottle: Gram positive cocci in clusters resembling Staph      Results called to and read back by:Miguelina Thomas RN 08/28/2024  22:59      MICROCOCCUS SPECIES  Organism is a probable contaminant      Culture, Anaerobe [[id number]] Collected: 08/27/24 1446    Order Status: Completed Specimen: Skin from Abdomen Updated: 08/29/24 0946     Anaerobic Culture Culture in progress    Narrative:      Driveline site    MRSA/SA Rapid ID by PCR from Blood culture [[id number]]  (Abnormal) Collected: 08/27/24 1802    Order Status: Completed Updated: 08/29/24 0027     Staph aureus ID by PCR Positive     Methicillin Resistant ID by PCR Negative    Culture, Anaerobe [[id number]] Collected: 08/25/24 1733    Order Status: Completed Specimen: Wound from Abdomen Updated: 08/28/24 1010     Anaerobic Culture Culture in progress    Narrative:      Drive line exit site    Blood culture [[id number]]      Order Status: Canceled Specimen: Blood     Blood culture [[id number]]     Order Status: Canceled Specimen: Blood     Aerobic culture [[id number]] Collected: 08/25/24 1733    Order Status: Sent Specimen: Wound from Abdomen Updated: 08/25/24 1734            Significant Imaging: I have reviewed all pertinent imaging results/findings within the past 24 hours.

## 2024-08-29 NOTE — PROGRESS NOTES
[facility] - Cardiology Stepdown  Endocrinology  Progress Note    Admit Date: 8/25/2024     Reason for Consult: Management of T2DM, Hyperglycemia     Surgical Procedure and Date: LVAD 06/29/2022     Diabetes diagnosis year: 2022    Home Diabetes Medications:  Levemir 20 units twice daily and Novolog SSI (150/25) per patient    How often checking glucose at home?  Has not checked in a few weeks due to running out of strips    BG readings on regimen: COLLIN  Hypoglycemia on the regimen?  Yes; rarely experiences hypoglycemic symptoms such as blurry vision and vomiting. However, does not know his blood sugar level due to running out of supplies. He will self-correct with a snack.  Missed doses on regimen?  Yes, has not had insulin in a few weeks due to running out of glucometer supplies.    Diabetes Complications include:   Hyperglycemia    Complicating diabetes co morbidities:   History of MI, CHF, and CKD      HPI:   Patient is a 39 y.o. 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 He underwent RVAD removal and chest closure 6/30/2022.  He was weaned off  but restarted due to RVF. He was transitioned to milrinone (secondary to  shortage). History of unclear syncope vs seizures and followed by neuro and is on Keppra. On 11/15/23 underwent removal of eroded [employer] scICD--no Lifevest (due to LVAD) and no plan to replace ICD as not requiring therapy post LVAD with concern for reinfection. Reported back pain (primarily upper back pain) for the past 3-4 days. He reported that it started 2 days after stopping pirmacor and he felt it may be related. Reported some shortness of breath after walking long distances which has been ongoing for months now and has not changed lately. He has lost some weight over last few months since his  "but weight has been stable over the last month. Patient admitted after being out of [facility] for 1 week with ongoing back pain. Patient stated he has not taken his insulin in a few weeks due to running out of glucometer testing supplies. He previously had a William, but is not wearing one. BG >700 at [facility]. Endo consulted for BG management.      Interval HPI:   Overnight events:  NAEON. Remains in CSU. BG variable on current SQ insulin regimen. Diet diabetic Cardiac (Low Na/Chol); 2000 Calorie; Fluid - 1500mL; Standard Tray    Eatin%  Nausea: No  Hypoglycemia and intervention: No  Fever: No  TPN and/or TF: No  If yes, type of TF/TPN and rate: No    BP (!) 82/0 (BP Location: Right arm, Patient Position: Lying)   Pulse 61   Temp 97.5 °F (36.4 °C) (Oral)   Resp 18   Ht 6' 3" (1.905 m)   Wt 68.5 kg (151 lb 0.2 oz)   SpO2 100%   BMI 18.88 kg/m²     Labs Reviewed and Include    Recent Labs   Lab 24  0346   *   CALCIUM 9.2   *   K 3.6   CO2 26   CL 93*   BUN 20   CREATININE 1.1     Lab Results   Component Value Date    WBC 10.09 2024    HGB 7.8 (L) 2024    HCT 27.2 (L) 2024    MCV 68 (L) 2024     2024     Recent Labs   Lab 24  1058   TSH 2.009     Lab Results   Component Value Date    HGBA1C 10.3 (H) 2024       Nutritional status:   Body mass index is 18.88 kg/m².  Lab Results   Component Value Date    ALBUMIN 2.4 (L) 2024    ALBUMIN 2.2 (L) 2024    ALBUMIN 2.4 (L) 2024     Lab Results   Component Value Date    PREALBUMIN 8 (L) 2024    PREALBUMIN 7 (L) 2024    PREALBUMIN 8 (L) 2024       Estimated Creatinine Clearance: 87.4 mL/min (based on SCr of 1.1 mg/dL).    Accu-Checks  Recent Labs     24  0140 24  0645 24  1055 24  1631 24  2003 24  0625 24  1056 24  1720 24  1942 24  0609   POCTGLUCOSE 258* 241* 369* 171* 94 279* 432* 70 129* 238* "       Current Medications and/or Treatments Impacting Glycemic Control  Immunotherapy:    Immunosuppressants       None          Steroids:   Hormones (From admission, onward)      None          Pressors:    Autonomic Drugs (From admission, onward)      None          Hyperglycemia/Diabetes Medications:   Antihyperglycemics (From admission, onward)      Start     Stop Route Frequency Ordered    08/28/24 0900  insulin glargine U-100 (Lantus) pen 30 Units         -- SubQ 2 times daily 08/28/24 0721    08/27/24 1130  insulin aspart U-100 pen 20 Units         -- SubQ 3 times daily with meals 08/27/24 0819    08/25/24 1506  insulin aspart U-100 pen 0-10 Units         -- SubQ Before meals & nightly PRN 08/25/24 1407            ASSESSMENT and PLAN    Cardiac/Vascular  * LVAD (left ventricular assist device) present  Managed by primary team  Optimize blood glucose control        CHF (NYHA class IV, ACC/AHA stage D)  Managed by primary team  Optimize blood glucose control      Endocrine  Type 2 diabetes mellitus with hyperglycemia, with long-term current use of insulin  BG goal 140-180  Noncompliant T2DM.     - Continue Lantus (Insulin Glargine) 30 units BID   - Continue Novolog (Insulin Aspart) 20 units TIDWM.  - Continue moderate dose correction (150/25)  - BG checks AC/HS  - Hypoglycemia protocol in place    ** Please notify Endocrine for any change and/or advance in diet**  ** Please call Endocrine for any BG related issues **    Discharge Planning:   TBD. Please notify endocrinology prior to discharge.  Patient requires glucometer and testing supplies.  Recommend he resumes his William for blood sugar monitoring.          Jody Starkey NP  Endocrinology  [facility] - Cardiology Stepdown

## 2024-08-29 NOTE — PLAN OF CARE
Plan of care discussed with patient and spouse. Patient ambulating with stand- by assist, fall precautions in place. No falls or injuries throughout the shift. LVAD DP and numbers WNL, smooth LVAD hum. No LFA.  Patient with intermittent complaints of muscle spasms, managed with PRN tylenol and flexeril. Discussed medications and care. Pt on IV Cefazolin Q 8 hrs as per EMAR. PICC line removed by MD order. Strict I&O's maintained. Patient has no questions at this time.     Problem: Adult Inpatient Plan of Care  Goal: Plan of Care Review  Outcome: Progressing  Goal: Patient-Specific Goal (Individualized)  Outcome: Progressing  Goal: Absence of Hospital-Acquired Illness or Injury  Outcome: Progressing  Goal: Optimal Comfort and Wellbeing  Outcome: Progressing  Goal: Readiness for Transition of Care  Outcome: Progressing     Problem: Diabetes Comorbidity  Goal: Blood Glucose Level Within Targeted Range  Outcome: Progressing     Problem: Infection  Goal: Absence of Infection Signs and Symptoms  Outcome: Progressing     Problem: Ventricular Assist Device  Goal: Optimal Adjustment to Device  Outcome: Progressing  Goal: Absence of Bleeding  Outcome: Progressing  Goal: Absence of Embolism Signs and Symptoms  Outcome: Progressing  Goal: Optimal Blood Flow  Outcome: Progressing  Goal: Absence of Infection Signs and Symptoms  Outcome: Progressing  Goal: Effective Right-Sided Heart Function  Outcome: Progressing     Problem: Ventricular Assist Device  Goal: Optimal Adjustment to Device  Outcome: Progressing  Goal: Absence of Bleeding  Outcome: Progressing  Goal: Absence of Embolism Signs and Symptoms  Outcome: Progressing  Goal: Optimal Blood Flow  Outcome: Progressing  Goal: Absence of Infection Signs and Symptoms  Outcome: Progressing  Goal: Effective Right-Sided Heart Function  Outcome: Progressing     Problem: Coping Ineffective  Goal: Effective Coping  Outcome: Progressing     Problem: Fall Injury Risk  Goal: Absence of Fall  and Fall-Related Injury  Outcome: Progressing

## 2024-08-29 NOTE — PROGRESS NOTES
08/29/24 1352   Critical Value Communication   Date Result Received 08/29/24   Time Result Received 1348   Resulting Department of Critical Value [facility]   Critical Test #1 Positive blood culture   Critical Test #1 Result Gram + cocci resembling Staph (anaerobic sample, collected 8/27)   Name of Notified Physician/Designee Alice Ayala MD   Date Notified 08/29/24   Time Notified 1353   Read Back Verification Yes

## 2024-08-29 NOTE — SUBJECTIVE & OBJECTIVE
Interval History: Pt doing well overnight, denies any denies any chest pain, shortness of breath, lightheadedness, dizziness, fevers or chills overnight. Cont to have back pain and notably has r sided cva tenderness. No overlying skin changes, no wbc count overnight. D/w IR, no need for drain placement at this time and risk outweighs benefit given INR/on therapeutic a/c for VAD.    Blood cultures are positive for staphylococcus aureus vs micrococcus- MRSA swab negative. Will remove PICC     Responding well to 2 mg of Bumex for diuresis, no issue any allergic reaction.  Kidney function at baseline and euvolemic/ no JVD. No abdominal distension, abdomen soft. Pt to have cont palliative care discussions regarding goals, pt educated on need for compliance. Will plan to have joint GOC conversation today or tomorrow as well.       Continuous Infusions: none       Scheduled Meds:   [START ON 9/9/2024] amiodarone  200 mg Oral Daily    amiodarone  400 mg Oral BID    amLODIPine  5 mg Oral Daily    aspirin  81 mg Oral Daily    atorvastatin  40 mg Oral Daily    bumetanide  2 mg Oral BID loop    ceFAZolin (Ancef) IV (PEDS and ADULTS)  2 g Intravenous Q8H    DULoxetine  30 mg Oral Daily    eplerenone  25 mg Oral Daily    gabapentin  100 mg Oral BID    gabapentin  400 mg Oral QHS    insulin aspart U-100  20 Units Subcutaneous TIDWM    insulin glargine U-100  30 Units Subcutaneous BID    levETIRAcetam  1,500 mg Oral BID    LIDOcaine  2 patch Transdermal Q24H    magnesium oxide  400 mg Oral Daily    nicotine  1 patch Transdermal Daily    pantoprazole  40 mg Oral Daily    warfarin  2 mg Oral Daily     PRN Meds:  Current Facility-Administered Medications:     acetaminophen, 650 mg, Oral, Q6H PRN    cyclobenzaprine, 5 mg, Oral, Daily PRN    dextrose 10%, 12.5 g, Intravenous, PRN    dextrose 10%, 25 g, Intravenous, PRN    glucagon (human recombinant), 1 mg, Intramuscular, PRN    glucose, 16 g, Oral, PRN    glucose, 24 g, Oral, PRN     insulin aspart U-100, 0-10 Units, Subcutaneous, QID (AC + HS) PRN    ondansetron, 4 mg, Oral, Q8H PRN    Review of patient's allergies indicates:   Allergen Reactions    Torsemide Hives     Objective:     Vital Signs (Most Recent):  Temp: 97.6 °F (36.4 °C) (08/29/24 1129)  Pulse: 100 (08/29/24 1400)  Resp: 18 (08/29/24 1129)  BP: (!) 72/0 (08/29/24 1206)  SpO2: 98 % (08/29/24 1129) Vital Signs (24h Range):  Temp:  [97.5 °F (36.4 °C)-98.9 °F (37.2 °C)] 97.6 °F (36.4 °C)  Pulse:  [] 100  Resp:  [18] 18  SpO2:  [98 %-100 %] 98 %  BP: ()/(0-74) 72/0     Patient Vitals for the past 72 hrs (Last 3 readings):   Weight   08/28/24 0330 68.5 kg (151 lb 0.2 oz)   08/27/24 0415 68.9 kg (151 lb 14.4 oz)   08/26/24 1516 70.8 kg (156 lb 1.4 oz)     Body mass index is 18.88 kg/m².      Intake/Output Summary (Last 24 hours) at 8/29/2024 1432  Last data filed at 8/29/2024 1209  Gross per 24 hour   Intake 1012 ml   Output 3725 ml   Net -2713 ml       Hemodynamic Parameters:  CVP:  [16 mmHg] 16 mmHg    EKG- without ischemic changes this AM        Physical Exam  Constitutional:       General: He is not in acute distress.     Appearance: He is normal weight.   HENT:      Head: Normocephalic and atraumatic.      Right Ear: External ear normal.      Left Ear: External ear normal.      Nose: Nose normal.      Mouth/Throat:      Pharynx: Oropharynx is clear.   Cardiovascular:      Comments: Normal VAD hum, JVD present but improving   Pulmonary:      Effort: Pulmonary effort is normal.   Abdominal:      General: Abdomen is flat. Bowel sounds are normal. There is no distension (Distended but soft.).      Palpations: Abdomen is soft. There is no mass.      Tenderness: There is no abdominal tenderness. There is right CVA tenderness.   Musculoskeletal:      Cervical back: Normal range of motion.      Right lower leg: No edema.      Left lower leg: No edema.      Comments: Cont b/l back pain- upper spine and b/l paraspinal/upper back  muscular pain.    Skin:     General: Skin is warm.      Comments: Chest wall on the right side with gynecomastia, no overlying skin changes, no tenderness at the site however palpable mass versus nodule present.   Neurological:      Mental Status: He is alert. Mental status is at baseline.            Significant Labs:  CBC:  Recent Labs   Lab 08/27/24  1443 08/28/24  0345 08/29/24  0346   WBC 10.43 11.66 10.09   RBC 4.38* 4.21* 4.01*   HGB 8.3* 8.0* 7.8*   HCT 29.2* 27.2* 27.2*    309 335   MCV 67* 65* 68*   MCH 18.9* 19.0* 19.5*   MCHC 28.4* 29.4* 28.7*     BNP:  Recent Labs   Lab 08/25/24  1150 08/26/24  0508 08/28/24  0345   * 461* 376*     CMP:  Recent Labs   Lab 08/25/24  1150 08/25/24  2029 08/26/24  0508 08/26/24  2221 08/27/24  0530 08/28/24  0345 08/29/24  0346   *   < > 374*   < > 161* 219* 170*   CALCIUM 8.8   < > 8.8   < > 8.9 9.0 9.2   ALBUMIN 2.4*  --  2.2*  --   --  2.4*  --    PROT 7.9  --  7.4  --   --  7.6  --    *   < > 130*   < > 130* 131* 129*   K 2.7*   < > 4.0   < > 4.1 3.6 3.6   CO2 31*   < > 29   < > 28 26 26   CL 92*   < > 94*   < > 92* 92* 93*   BUN 11   < > 13   < > 14 19 20   CREATININE 1.0   < > 1.0   < > 1.0 1.1 1.1   ALKPHOS 207*  --  199*  --   --  215*  --    ALT 14  --  13  --   --  13  --    AST 24  --  23  --   --  24  --    BILITOT 2.3*  --  2.2*  --   --  2.4*  --     < > = values in this interval not displayed.      Coagulation:   Recent Labs   Lab 08/27/24  0530 08/28/24  0345 08/29/24  0346   INR 2.7* 2.3* 2.5*   APTT 51.1* 43.1* 39.8*     LDH:  Recent Labs   Lab 08/27/24  0530 08/28/24  0345 08/29/24  0346   * 326* 288*     Microbiology:  Microbiology Results (last 7 days)       Procedure Component Value Units Date/Time    Aerobic culture [[id number]]  (Abnormal)  (Susceptibility) Collected: 08/27/24 1446    Order Status: Completed Specimen: Skin from Abdomen Updated: 08/29/24 1424     Aerobic Bacterial Culture STAPHYLOCOCCUS AUREUS  Rare       Narrative:      Driveline site    Blood culture [[id number]] Collected: 08/27/24 1751    Order Status: Completed Specimen: Blood Updated: 08/29/24 1350     Blood Culture, Routine Gram stain dudley bottle: Gram positive cocci in clusters resembling Staph      Results called to and read back by: Lorri Riggs RN  08/29/2024   13:50    Narrative:      Blood culture # 2 different location.    Blood culture [[id number]] Collected: 08/28/24 2226    Order Status: Completed Specimen: Blood Updated: 08/29/24 1145     Blood Culture, Routine No Growth to date    Narrative:      Code 24058  Draw sample # 2 from separate site    Blood culture [[id number]] Collected: 08/28/24 2226    Order Status: Completed Specimen: Blood Updated: 08/29/24 1145     Blood Culture, Routine No Growth to date    Narrative:      Code 54062  Draw sample # 2 from separate site    Blood culture [[id number]]  (Abnormal) Collected: 08/27/24 1802    Order Status: Completed Specimen: Blood Updated: 08/29/24 1014     Blood Culture, Routine Gram stain aer bottle: Gram positive cocci in clusters resembling Staph      Results called to and read back by:Miguelina Thomas RN 08/28/2024  22:59      MICROCOCCUS SPECIES  Organism is a probable contaminant      Culture, Anaerobe [[id number]] Collected: 08/27/24 1446    Order Status: Completed Specimen: Skin from Abdomen Updated: 08/29/24 0946     Anaerobic Culture Culture in progress    Narrative:      Driveline site    MRSA/SA Rapid ID by PCR from Blood culture [[id number]]  (Abnormal) Collected: 08/27/24 1802    Order Status: Completed Updated: 08/29/24 0027     Staph aureus ID by PCR Positive     Methicillin Resistant ID by PCR Negative    Culture, Anaerobe [[id number]] Collected: 08/25/24 1733    Order Status: Completed Specimen: Wound from Abdomen Updated: 08/28/24 1010     Anaerobic Culture Culture in progress    Narrative:      Drive line exit site    Blood culture [[id number]]     Order Status: Canceled Specimen:  Blood     Blood culture [[id number]]     Order Status: Canceled Specimen: Blood     Aerobic culture [[id number]] Collected: 08/25/24 1733    Order Status: Sent Specimen: Wound from Abdomen Updated: 08/25/24 1734            I have reviewed all pertinent labs within the past 24 hours.    Estimated Creatinine Clearance: 87.4 mL/min (based on SCr of 1.1 mg/dL).    Diagnostic Results:  I have reviewed and interpreted all pertinent imaging results/findings within the past 24 hours.

## 2024-08-29 NOTE — CONSULTS
[facility] Stepdown  Palliative Medicine  Consult Note    Patient Name: Kevan Queen  MRN: [id number]  Admission Date: 8/25/2024  Hospital Length of Stay: 4 days  Code Status: Full Code   Attending Provider: Dalia Crum MD  Consulting Provider: DENZEL Coppola  Primary Care Physician: Rosio Armendariz FNP  Principal Problem:LVAD (left ventricular assist device) present    Patient information was obtained from patient, past medical records, and primary team.      Consults  Assessment/Plan:     Palliative Care  Palliative care encounter  Pal med consulted for goals of care for  a 40 yo gentleman post VAD placement .  Returns to hospital with c/o pain after running out of primacor at home. He did not notify VAD team of difficulties. Patient is awake alert and oriented. At time of this encounter Mr. Queen is in bed with lights out in the room.  Converses freely with short answers.  Indicates he is comfortable at this time.      Advance Care Planning    Date: 08/28/2024    Power of   I initiated the process of voluntary advance care planning today and explained the importance of this process to the patient.  I introduced the concept of advance directives to the patient, as well. Then the patient received detailed information about the importance of designating a Health Care Power of  (HCPOA). He was also instructed to communicate with this person about their wishes for future healthcare, should he become sick and lose decision-making capacity. The patient has previously appointed a HCPOA. After our discussion, the patient has not decided to complete a HCPOA and has appointed his significant other, health care agent: Niharika Wright  & health care agent number:  [phone] . I encouraged him to communicate with this person about their wishes for future healthcare, should he become sick and lose decision-making capacity.      A total of 20  min was spent on advance  care planning, goals of care discussion, emotional support, formulating and communicating prognosis and exploring burden/benefit of various approaches of treatment. This discussion occurred on a fully voluntary basis with the verbal consent of the patient and/or family.    Advance Care Planning    Date: 08/28/2024    [facility]  I engaged the patient in a voluntary conversation about advance care planning and we specifically addressed what the goals of care would be moving forward, in light of the patient's change in clinical status, specifically non adherence with plan of care and continuing medications as ordered. .  We did not specifically address the patient's likely prognosis. We did discuss the importance of adherence with medications and return to clinic as directed.  Explained follow up in out patient pal med clinic is a good resource for continued conversations and clarification of information.  we explored the patient's values and preferences for future care.  The patient endorses that what is most important right now is to focus on maintaining the VAD and notifying VAD clinic  as soon as possible regarding any changes or sudden stops in medications in medications.    - Patient reports his wife, significant other is primary care giver.    - Ultimately Mr. Queen's goal is to have a healthier and longer live with his family and children.   -Patient states understanding adherence to  plan of care is most important.      Accordingly, we have decided that the best plan to meet the patient's goals includes continuing with treatment    A total of 20  min was spent on advance care planning, goals of care discussion, emotional support, formulating and communicating prognosis and exploring burden/benefit of various approaches of treatment. This discussion occurred on a fully voluntary basis with the verbal consent of the patient and/or family.           Symptom Management   Pain  - Pt reports initial pain following  running out of primacor. Does have intermittent chest pain/pressure with straining on the toilet as well but no radiation of pain or issues with nausea/vomiting/diaphoresis/sob with symptoms. S/p spinal Xrays without significant abnormality.   - reports intermittent dull achy pain in lower back - unchanged from time of admit   - pain does not radiate   - patient associates pain with discontinuation of primacor   Recommendations continue current medical management - prn gabapentin, acetaminophen, cyclobenzaprine   Lidocaine     Dyspnea   - mild dyspnea on exertion   - no supplemental oxygen  in use  - room air oxygen saturation is 94-98%      LVAD (left ventricular assist device) present  -HeartMate 3 Implanted  6/29/2022  as DT  -HTS Primary  -iInterrogation notable for  power cable disconnected, low voltage advisory, PI events  -Not listed for OHTx- advised pt he would not be candidate for OHTx (noncompliance/poor f/up)   -UDS negative  - primary care giver niharika has been unavailable to telephon      Recommendations   - continue current plan of care - goal is to maintain LVAD for prolonged life expectancy and time with his children   - Niharika Wright is primary care giver. She has been unavailable at the hospital.   - Patient and primary care giver would benefit from continued education and information regarding plan of care, prognosis is adherence is not maintained and what to expect in the furture.  - may benefit from f/u with Dr. Carr in outpatient pal med/heart failure clinic. Virtual appointment is available if transportation difficulties continue.      Dr. Crum's primary team resident has been notified of the above via secure chat message.    Thank you for consult and opportunity to participate in Mr. Queen's care.          Thank you for your consult. I will follow-up with patient. Please contact us if you have any additional questions.    Subjective:     HPI:   HPI obtained from chart review:      As per H & P  Mr. 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 He underwent RVAD removal and chest closure 6/30/2022.  He was weaned off  but he had to restarted due to RVF. He was eventually transitioned to milrinone (secondary to  shortage) now supposed to be on 0.25 mcg/kg/min. He has a history of unclear syncope vs seizures and is followed by neuro for this and is on Keppra.  He is being admitted after being out of primacor for 1 week with ongoing back pain.      On 11/15/23 underwent removal of eroded [employer] scICD--no Lifevest (due to LVAD) and no plan to replace ICD as not requiring therapy post LVAD with concern for reinfection.  He has not had neurology f/u with seizure hx on keppra so coordinator to assist him with obtaining appt.        Reports back pain (primarily upper back pain) for the past 3-4 days. He reports that it started 2 days after stopping pirmacor and he feels it may be related. He has also been sleeping in the couch and identifies that it may also be related to the back pain. He denies lifting anything heavy or any falls /trauma. No red flags including incontinence of stool or urine. No numbness in the groin area or weakness in the legs or upper extremity reported. He reports he was not able to go for appointment because of transport issue that has now been resolved. He denies orthopnea, PND, weight gain, leg swelling. He says that he does have some shortness of breath after walking long distances which has been ongoing for months now and has not changed lately. He has lost some weight over last few months since his but weight has been stable over the last month. He denied headache, constipation, diarrhea, SOB,  cough, palpitations or any additional symptoms.     Hospital Course:  No notes on file    Interval History:     Past Medical History:   Diagnosis Date    Acute respiratory failure with hypoxia 07/22/2023    Arthritis     Awareness alteration, transient 09/01/2022    Cardiomyopathy     CHF (congestive heart failure) 10/01/2020    COVID-19 06/03/2023    Diabetes mellitus     Dilated cardiomyopathy 10/26/2020    Drug abuse 10/2020    Headache 04/19/2023    Hyperglycemia 12/16/2022    Hyperosmolar hyperglycemic state (HHS) 05/25/2022    ICD (implantable cardioverter-defibrillator) in place 10/26/2020    Infected defibrillator now s/p removal Nov 20232023 07/23/2023    Left ventricular assist device (LVAD) complication, initial encounter 06/05/2023    Muscle cramping 06/15/2022    Renal disorder     SOB (shortness of breath) 06/13/2022    Tingling in extremities 07/13/2022       Past Surgical History:   Procedure Laterality Date    APPLICATION OF WOUND VACUUM-ASSISTED CLOSURE DEVICE N/A 6/30/2022    Procedure: APPLICATION, WOUND VAC;  Surgeon: Luis F Paige MD;  Location: [facility] OR [address] [place] [address];  Service: Cardiovascular;  Laterality: N/A;  50 x 5 cm    CARDIAC DEFIBRILLATOR PLACEMENT      ECHOCARDIOGRAM,TRANSESOPHAGEAL  11/9/2023    Procedure: Transesophageal echo (GUILLERMO) intra-procedure log documentation;  Surgeon: Eron Ivy MD;  Location: [facility] EP LAB;  Service: Cardiology;;    EXTRACTION, ELECTRODE LEAD Left 11/9/2023    Procedure: EXTRACTION, ELECTRODE LEAD;  Surgeon: ADALID Leon MD;  Location: [facility] EP LAB;  Service: Cardiology;  Laterality: Left;  INFECTION, LEAD EXTRACTION, GUILLERMO, BSCI, ANES, EH,   *NO CTS BACKUP*    IMPLANTATION OF RIGHT VENTRICULAR ASSIST DEVICE (RVAD) N/A 6/29/2022    Procedure: INSERTION, RVAD;  Surgeon: Luis F Paige MD;  Location: [facility] OR [address] [place] [address];  Service: Cardiovascular;  Laterality: N/A;    INSERTION OF GRAFT TO PERICARDIUM Right 6/30/2022    Procedure: INSERTION-RIGHT VENTRICULAR  ASSIST DEVICE;  Surgeon: Luis F Paige MD;  [facility];  Service: Cardiovascular;  Laterality: Right;    IRRIGATION OF MEDIASTINUM  6/30/2022    Procedure: IRRIGATION, MEDIASTINUM;  Surgeon: Luis F Paige MD;  [facility];  Service: Cardiovascular;;    LEFT VENTRICULAR ASSIST DEVICE Left 6/23/2022    Procedure: INSERTION-LEFT VENTRICULAR ASSIST DEVICE;  Surgeon: Luis F Paige MD;  [facility];  Service: Cardiovascular;  Laterality: Left;    LEFT VENTRICULAR ASSIST DEVICE N/A 6/29/2022    Procedure: INSERTION-LEFT VENTRICULAR ASSIST DEVICE;  Surgeon: Luis F Paige MD;  [facility];  Service: Cardiovascular;  Laterality: N/A;    REVISION OF SKIN POCKET FOR CARDIOVERTER-DEFIBRILLATOR  11/9/2023    Procedure: REVISION, SKIN POCKET, FOR CARDIOVERTER-DEFIBRILLATOR;  Surgeon: ADALID Leon MD;  Location: [facility] EP LAB;  Service: Cardiology;;    RIGHT HEART CATHETERIZATION Right 4/8/2022    Procedure: INSERTION, CATHETER, RIGHT HEART;  Surgeon: Luca Lopez Jr., MD;  Location: [facility] CATH LAB;  Service: Cardiology;  Laterality: Right;    RIGHT HEART CATHETERIZATION Right 4/19/2022    Procedure: INSERTION, CATHETER, RIGHT HEART;  Surgeon: Josh Pulido MD;  Location: [facility] CATH LAB;  Service: Cardiology;  Laterality: Right;    RIGHT HEART CATHETERIZATION Right 7/21/2022    Procedure: INSERTION, CATHETER, RIGHT HEART;  Surgeon: Dalia Crum MD;  Location: [facility] CATH LAB;  Service: Cardiology;  Laterality: Right;    RIGHT HEART CATHETERIZATION Right 10/31/2022    Procedure: INSERTION, CATHETER, RIGHT HEART;  Surgeon: Dalia Crum MD;  Location: [facility] CATH LAB;  Service: Cardiology;  Laterality: Right;    STERNAL WOUND CLOSURE N/A 6/30/2022    Procedure: CLOSURE, WOUND, STERNUM;  Surgeon: Luis F Paige MD;  [facility];  Service: Cardiovascular;  Laterality: N/A;       Review of patient's allergies indicates:   Allergen Reactions     Bumex [bumetanide] Hives    Torsemide Hives       Medications:  Continuous Infusions:   amiodarone in dextrose 5%  0.5 mg/min Intravenous Continuous 16.7 mL/hr at 08/27/24 0421 0.5 mg/min at 08/27/24 0421     Scheduled Meds:   amLODIPine  5 mg Oral Daily    aspirin  81 mg Oral Daily    atorvastatin  40 mg Oral Daily    bumetanide  2 mg Oral BID loop    DULoxetine  30 mg Oral Daily    gabapentin  100 mg Oral BID    gabapentin  400 mg Oral QHS    insulin aspart U-100  18 Units Subcutaneous TIDWM    insulin glargine U-100  19 Units Subcutaneous BID    levETIRAcetam  1,500 mg Oral BID    LIDOcaine  2 patch Transdermal Q24H    magnesium oxide  400 mg Oral Daily    nicotine  1 patch Transdermal Daily    pantoprazole  40 mg Oral Daily    spironolactone  25 mg Oral Daily     PRN Meds:  Current Facility-Administered Medications:     acetaminophen, 650 mg, Oral, Q6H PRN    cyclobenzaprine, 5 mg, Oral, Daily PRN    dextrose 10%, 12.5 g, Intravenous, PRN    dextrose 10%, 25 g, Intravenous, PRN    glucagon (human recombinant), 1 mg, Intramuscular, PRN    glucose, 16 g, Oral, PRN    glucose, 24 g, Oral, PRN    insulin aspart U-100, 0-10 Units, Subcutaneous, QID (AC + HS) PRN    ondansetron, 4 mg, Oral, Q8H PRN    Family History       Problem Relation (Age of Onset)    Diverticulosis Brother    Heart attack Maternal Grandmother, Maternal Grandfather    Heart failure Father          Tobacco Use    Smoking status: Former     Current packs/day: 0.00     Average packs/day: 0.5 packs/day for 16.6 years (8.3 ttl pk-yrs)     Types: Cigarettes     Start date: 10/1/2004     Quit date: 4/23/2021     Years since quitting: 3.3    Smokeless tobacco: Never   Substance and Sexual Activity    Alcohol use: Not Currently    Drug use: Not Currently     Types: Marijuana, MDMA (Ecstacy)    Sexual activity: Yes     Partners: Female     Birth control/protection: None       Review of Systems   Constitutional:  Positive for unexpected weight change.    Respiratory:  Positive for shortness of breath.    Cardiovascular:  Negative for chest pain and leg swelling.   Gastrointestinal:  Negative for nausea and vomiting.   Musculoskeletal:  Positive for back pain.   Psychiatric/Behavioral:  Negative for confusion.      Objective:     Vital Signs (Most Recent):  Temp: 99.9 °F (37.7 °C) (08/27/24 0415)  Pulse: 108 (08/27/24 0630)  Resp: 20 (08/27/24 0415)  BP: (!) 90/0 (08/27/24 0415)  SpO2: 99 % (08/27/24 0415) Vital Signs (24h Range):  Temp:  [98.7 °F (37.1 °C)-99.9 °F (37.7 °C)] 99.9 °F (37.7 °C)  Pulse:  [] 108  Resp:  [18-20] 20  SpO2:  [92 %-99 %] 99 %  BP: (78-90)/(0) 90/0     Weight: 68.9 kg (151 lb 14.4 oz)  Body mass index is 18.99 kg/m².       Physical Exam  Vitals and nursing note reviewed.   Constitutional:       Appearance: He is ill-appearing.   Cardiovascular:      Rate and Rhythm: Tachycardia present.   Pulmonary:      Effort: Pulmonary effort is normal.   Abdominal:      General: There is no distension.      Tenderness: There is no abdominal tenderness.   Musculoskeletal:      Cervical back: Normal range of motion.   Skin:     General: Skin is warm and dry.   Neurological:      Mental Status: He is alert and oriented to person, place, and time.            Review of Symptoms      Symptom Assessment (ESAS 0-10 Scale)  Pain:  6  Dyspnea:  0  Anxiety:  0  Nausea:  0  Depression:  0  Anorexia:  0  Fatigue:  0  Insomnia:  0  Restlessness:  0  Agitation:  0     CAM / Delirium:  Negative  Constipation:  Negative  Diarrhea:  Negative      Bowel Management Plan (BMP):  No      Pain Assessment:  OME in 24 hours:  0  Location(s): chest    Chest       Location: anterior and bilateral        Quality: Dull and cramping        Quantity: 6/10 in intensity        Chronicity: Onset 3 week(s) ago, gradually worsening        Aggravating Factors: Walking        Alleviating Factors: Acetaminophen        Associated Symptoms: None    Performance Status:  70    Living  Arrangements:  Lives with family    Psychosocial/Cultural:   See Palliative Psychosocial Note: Yes  **Primary  to Follow**  Palliative Care  Consult: No    Spiritual:  F - Margie and Belief:  Not  discussed   A - Address in Care:  Not addressed at this time         Advance Care Planning  Advance Directives:   Living Will: No    LaPOST: No    Medical Power of : Yes    Agent's Name:  Niharika Wright   Agent's Contact Number:  [phone]  Goals of Care: What is most important right now is to focus on symptom/pain control, extending life as long as possible, even it it means sacrificing quality. Accordingly, we have decided that the best plan to meet the patient's goals includes continuing with treatment.         Significant Labs: All pertinent labs within the past 24 hours have been reviewed.  CBC:   Recent Labs   Lab 08/27/24  0530   WBC 14.40*   HGB 8.8*   HCT 29.1*   MCV 64*        BMP:  Recent Labs   Lab 08/27/24  0530   *   *   K 4.1   CL 92*   CO2 28   BUN 14   CREATININE 1.0   CALCIUM 8.9   MG 2.0     LFT:  Lab Results   Component Value Date    AST 23 08/26/2024    ALKPHOS 199 (H) 08/26/2024    BILITOT 2.2 (H) 08/26/2024     Albumin:   Albumin   Date Value Ref Range Status   08/26/2024 2.2 (L) 3.5 - 5.2 g/dL Final     Protein:   Total Protein   Date Value Ref Range Status   08/26/2024 7.4 6.0 - 8.4 g/dL Final     Lactic acid:   Lab Results   Component Value Date    LACTATE 1.5 01/23/2024    LACTATE 3.6 (HH) 01/23/2024       Significant Imaging: I have reviewed all pertinent imaging results/findings within the past 24 hours.  US of soft tissue chest 8/26/24  FINDINGS:  4.2 x 1.7 x 3.4 cm hypoechoic area at concern in the right chest/retroareolar region with peripheral vascularity and compressibility.  Nonspecific, may represent gynecomastia when compared to recent CT.  Difficult to exclude complex collection such as hematoma or abscess.  Recommend clinical  correlation.       30 mins spent in advanced care planning     Noreen Frey, CNS  Palliative Medicine  [facility] - Cardiology Stepdown

## 2024-08-29 NOTE — ASSESSMENT & PLAN NOTE
MSSA DLESI  Bacteremia  Pyelnephritis/renal abscess    I independently reviewed patient's lab work and images as documented. 40 yo male with LVAD HM3 as DT, prior MSSA DLESI/bacteremia c/b empyema (on suppressive cefadroxil) and eroded ICD/pocket infection s/p removal, seizure admitted for back pain. Hospital course notable for blood cx with micrococcus and PCR with Staph aureus (MRSA negative). DLES also with MSSA. Repeat blood cx no growth to date as of 8/28. CT with contrast now with irregular nonenhancing renal lesion c/f pyelonephritis or potential abscess, R chest lesion c/f gynecomastia. Has old PICC - though denies issues with it prior to admission. TTE without IE.     Recommendations:  -cefazolin 2g q8hr IV   -follow up blood cx  -pull old picc

## 2024-08-29 NOTE — ASSESSMENT & PLAN NOTE
BG goal 140-180  Noncompliant T2DM.     - Continue Lantus (Insulin Glargine) 30 units BID   - Continue Novolog (Insulin Aspart) 20 units TIDWM.  - Continue moderate dose correction (150/25)  - BG checks AC/HS  - Hypoglycemia protocol in place    ** Please notify Endocrine for any change and/or advance in diet**  ** Please call Endocrine for any BG related issues **    Discharge Planning:   TBD. Please notify endocrinology prior to discharge.  Patient requires glucometer and testing supplies.  Recommend he resumes his William for blood sugar monitoring.

## 2024-08-29 NOTE — PROGRESS NOTES
[facility] - Cardiology Stepdown  Infectious Disease  Progress Note    Patient Name: Kevan Queen  MRN: [id number]  Admission Date: 8/25/2024  Length of Stay: 4 days  Attending Physician: Dalia Crum MD  Primary Care Provider: Rosio Armendariz FNP    Isolation Status: No active isolations  Assessment/Plan:      Cardiac/Vascular  * LVAD (left ventricular assist device) present  MSSA DLESI  Bacteremia  Pyelnephritis/renal abscess    I independently reviewed patient's lab work and images as documented. 40 yo male with LVAD HM3 as DT, prior MSSA DLESI/bacteremia c/b empyema (on suppressive cefadroxil) and eroded ICD/pocket infection s/p removal, seizure admitted for back pain. Hospital course notable for blood cx now with micrococcus and PCR with Staph aureus (MRSA negative). DLES also with MSSA. Repeat blood cx no growth to date as of 8/28. CT with contrast now with irregular nonenhancing renal lesion c/f pyelonephritis or potential abscess and R chest lesion c/w gynecomastia. Has old PICC - though denies issues with it prior to admission. TTE without IE.     Recommendations:  -cefazolin 2g q8hr IV   -follow up blood cx  -pull old picc            Thank you for your consult. I will follow-up with patient. Please contact us if you have any additional questions. Above d/w primary team.       Laura Baldwin MD  Infectious Disease  [facility] - Cardiology Stepdown      50 minutes of total time spent on the encounter, which includes face to face time and non-face to face time preparing to see the patient (eg, review of tests), obtaining and/or reviewing separately obtained history, documenting clinical information in the electronic or other health record, independently interpreting results (not separately reported) and communicating results to the patient/family/caregiver, or care coordination (not separately reported).       Subjective:     Principal Problem:LVAD (left ventricular assist device)  present    HPI: 38 yo male with LVAD HM3 as DT, prior MSSA DLESI/bacteremia c/b empyema (on suppressive cefadroxil) and eroded ICD/pocket infection s/p removal, seizure admitted for back pain. ID consulted for abx recs. Hospital course notable for US of chest with hypoechoic area of unclear significance. DLES cx in process. Pt reported adherence to cefadroxil - denied issues with it. Denied fevers, chills, abdominal complaints or worsening drainage from DLES. Pt reported that he ran out of his primacor and had some back pain soon after. Denied problems with PICC. Reported swelling under R breast - pt states he is unclear how long he's had the swelling.            Interval History: No fevers documented overnight.   Reported worsening L flank pain overnight. States back pain is getting better.     Review of Systems   Constitutional:  Negative for chills and fever.   Genitourinary:  Positive for flank pain. Negative for difficulty urinating and dysuria.   Musculoskeletal:  Positive for back pain (improving).   All other systems reviewed and are negative.    Objective:     Vital Signs (Most Recent):  Temp: 97.6 °F (36.4 °C) (08/29/24 1129)  Pulse: 100 (08/29/24 1400)  Resp: 18 (08/29/24 1129)  BP: (!) 72/0 (08/29/24 1206)  SpO2: 98 % (08/29/24 1129) Vital Signs (24h Range):  Temp:  [97.5 °F (36.4 °C)-98.9 °F (37.2 °C)] 97.6 °F (36.4 °C)  Pulse:  [] 100  Resp:  [18] 18  SpO2:  [98 %-100 %] 98 %  BP: ()/(0-74) 72/0     Weight: 68.5 kg (151 lb 0.2 oz)  Body mass index is 18.88 kg/m².    Estimated Creatinine Clearance: 87.4 mL/min (based on SCr of 1.1 mg/dL).     Physical Exam  Constitutional:       General: He is not in acute distress.     Appearance: He is not ill-appearing or toxic-appearing.   Eyes:      General:         Right eye: No discharge.         Left eye: No discharge.   Pulmonary:      Effort: Pulmonary effort is normal. No respiratory distress.   Abdominal:      General: There is no distension.       Palpations: Abdomen is soft.      Tenderness: There is left CVA tenderness. There is no right CVA tenderness.      Comments: LVAD dressed   Musculoskeletal:         General: No tenderness (no spinal tenderness).   Skin:     General: Skin is warm and dry.      Comments: L picc   Neurological:      Mental Status: He is alert and oriented to person, place, and time.          Significant Labs:   Microbiology Results (last 7 days)       Procedure Component Value Units Date/Time    Aerobic culture [[id number]]  (Abnormal)  (Susceptibility) Collected: 08/27/24 1446    Order Status: Completed Specimen: Skin from Abdomen Updated: 08/29/24 1424     Aerobic Bacterial Culture STAPHYLOCOCCUS AUREUS  Rare      Narrative:      Driveline site    Blood culture [[id number]] Collected: 08/27/24 1751    Order Status: Completed Specimen: Blood Updated: 08/29/24 1350     Blood Culture, Routine Gram stain dudley bottle: Gram positive cocci in clusters resembling Staph      Results called to and read back by: Lorri Riggs RN  08/29/2024   13:50    Narrative:      Blood culture # 2 different location.    Blood culture [[id number]] Collected: 08/28/24 2226    Order Status: Completed Specimen: Blood Updated: 08/29/24 1145     Blood Culture, Routine No Growth to date    Narrative:      Code 89519  Draw sample # 2 from separate site    Blood culture [[id number]] Collected: 08/28/24 2226    Order Status: Completed Specimen: Blood Updated: 08/29/24 1145     Blood Culture, Routine No Growth to date    Narrative:      Code 34565  Draw sample # 2 from separate site    Blood culture [[id number]]  (Abnormal) Collected: 08/27/24 1802    Order Status: Completed Specimen: Blood Updated: 08/29/24 1014     Blood Culture, Routine Gram stain aer bottle: Gram positive cocci in clusters resembling Staph      Results called to and read back by:Miguelina Thomas RN 08/28/2024  22:59      MICROCOCCUS SPECIES  Organism is a probable contaminant      Culture, Anaerobe  [[id number]] Collected: 08/27/24 1446    Order Status: Completed Specimen: Skin from Abdomen Updated: 08/29/24 0946     Anaerobic Culture Culture in progress    Narrative:      Driveline site    MRSA/SA Rapid ID by PCR from Blood culture [[id number]]  (Abnormal) Collected: 08/27/24 1802    Order Status: Completed Updated: 08/29/24 0027     Staph aureus ID by PCR Positive     Methicillin Resistant ID by PCR Negative    Culture, Anaerobe [[id number]] Collected: 08/25/24 1733    Order Status: Completed Specimen: Wound from Abdomen Updated: 08/28/24 1010     Anaerobic Culture Culture in progress    Narrative:      Drive line exit site    Blood culture [[id number]]     Order Status: Canceled Specimen: Blood     Blood culture [[id number]]     Order Status: Canceled Specimen: Blood     Aerobic culture [[id number]] Collected: 08/25/24 1733    Order Status: Sent Specimen: Wound from Abdomen Updated: 08/25/24 1734            Significant Imaging: I have reviewed all pertinent imaging results/findings within the past 24 hours.

## 2024-08-30 PROBLEM — N12 PYELONEPHRITIS: Status: ACTIVE | Noted: 2024-08-30

## 2024-08-30 LAB
ACINETOBACTER CALCOACETICUS/BAUMANNII COMPLEX: NOT DETECTED
ALBUMIN SERPL BCP-MCNC: 2.5 G/DL (ref 3.5–5.2)
ALP SERPL-CCNC: 221 U/L (ref 55–135)
ALT SERPL W/O P-5'-P-CCNC: 11 U/L (ref 10–44)
ANION GAP SERPL CALC-SCNC: 10 MMOL/L (ref 8–16)
APTT PPP: 40.1 SEC (ref 21–32)
AST SERPL-CCNC: 31 U/L (ref 10–40)
BACTERIA SPEC ANAEROBE CULT: NORMAL
BACTEROIDES FRAGILIS: NOT DETECTED
BASOPHILS # BLD AUTO: 0.04 K/UL (ref 0–0.2)
BASOPHILS NFR BLD: 0.4 % (ref 0–1.9)
BILIRUB DIRECT SERPL-MCNC: 1.5 MG/DL (ref 0.1–0.3)
BILIRUB SERPL-MCNC: 2 MG/DL (ref 0.1–1)
BNP SERPL-MCNC: 462 PG/ML (ref 0–99)
BUN SERPL-MCNC: 21 MG/DL (ref 6–20)
CALCIUM SERPL-MCNC: 9.1 MG/DL (ref 8.7–10.5)
CANDIDA ALBICANS: NOT DETECTED
CANDIDA AURIS: NOT DETECTED
CANDIDA GLABRATA: NOT DETECTED
CANDIDA KRUSEI: NOT DETECTED
CANDIDA PARAPSILOSIS: NOT DETECTED
CANDIDA TROPICALIS: NOT DETECTED
CHLORIDE SERPL-SCNC: 95 MMOL/L (ref 95–110)
CO2 SERPL-SCNC: 25 MMOL/L (ref 23–29)
CREAT SERPL-MCNC: 1.3 MG/DL (ref 0.5–1.4)
CRP SERPL-MCNC: 49.4 MG/L (ref 0–8.2)
CRYPTOCOCCUS NEOFORMANS/GATTII: NOT DETECTED
CTX-M GENE (ESBL PRODUCER): NORMAL
DIFFERENTIAL METHOD BLD: ABNORMAL
ENTEROBACTER CLOACAE COMPLEX: NOT DETECTED
ENTEROBACTERALES: NOT DETECTED
ENTEROCOCCUS FAECALIS: NOT DETECTED
ENTEROCOCCUS FAECIUM: NOT DETECTED
EOSINOPHIL # BLD AUTO: 0.1 K/UL (ref 0–0.5)
EOSINOPHIL NFR BLD: 0.5 % (ref 0–8)
ERYTHROCYTE [DISTWIDTH] IN BLOOD BY AUTOMATED COUNT: 28.1 % (ref 11.5–14.5)
ESCHERICHIA COLI: NOT DETECTED
EST. GFR  (NO RACE VARIABLE): >60 ML/MIN/1.73 M^2
GLUCOSE SERPL-MCNC: 287 MG/DL (ref 70–110)
HAEMOPHILUS INFLUENZAE: NOT DETECTED
HCT VFR BLD AUTO: 26.7 % (ref 40–54)
HGB BLD-MCNC: 8 G/DL (ref 14–18)
IMM GRANULOCYTES # BLD AUTO: 0.03 K/UL (ref 0–0.04)
IMM GRANULOCYTES NFR BLD AUTO: 0.3 % (ref 0–0.5)
IMP GENE (CARBAPENEM RESISTANT): NORMAL
INR PPP: 2.9 (ref 0.8–1.2)
KLEBSIELLA AEROGENES: NOT DETECTED
KLEBSIELLA OXYTOCA: NOT DETECTED
KLEBSIELLA PNEUMONIAE GROUP: NOT DETECTED
KPC RESISTANCE GENE (CARBAPENEM): NORMAL
LDH SERPL L TO P-CCNC: 255 U/L (ref 110–260)
LISTERIA MONOCYTOGENES: NOT DETECTED
LYMPHOCYTES # BLD AUTO: 1.2 K/UL (ref 1–4.8)
LYMPHOCYTES NFR BLD: 12.1 % (ref 18–48)
MAGNESIUM SERPL-MCNC: 1.8 MG/DL (ref 1.6–2.6)
MCH RBC QN AUTO: 19.8 PG (ref 27–31)
MCHC RBC AUTO-ENTMCNC: 30 G/DL (ref 32–36)
MCR-1: NORMAL
MCV RBC AUTO: 66 FL (ref 82–98)
MEC A/C AND MREJ (MRSA): NORMAL
MEC A/C: NORMAL
MONOCYTES # BLD AUTO: 0.7 K/UL (ref 0.3–1)
MONOCYTES NFR BLD: 7.5 % (ref 4–15)
NDM GENE (CARBAPENEM RESISTANT): NORMAL
NEISSERIA MENINGITIDIS: NOT DETECTED
NEUTROPHILS # BLD AUTO: 7.7 K/UL (ref 1.8–7.7)
NEUTROPHILS NFR BLD: 79.2 % (ref 38–73)
NRBC BLD-RTO: 0 /100 WBC
OHS QRS DURATION: 180 MS
OHS QTC CALCULATION: 500 MS
OXA-48-LIKE (CARBAPENEM RESISTANT): NORMAL
PHOSPHATE SERPL-MCNC: 3.9 MG/DL (ref 2.7–4.5)
PLATELET # BLD AUTO: 427 K/UL (ref 150–450)
PMV BLD AUTO: ABNORMAL FL (ref 9.2–12.9)
POCT GLUCOSE: 109 MG/DL (ref 70–110)
POCT GLUCOSE: 123 MG/DL (ref 70–110)
POCT GLUCOSE: 148 MG/DL (ref 70–110)
POCT GLUCOSE: 259 MG/DL (ref 70–110)
POCT GLUCOSE: 300 MG/DL (ref 70–110)
POCT GLUCOSE: 65 MG/DL (ref 70–110)
POTASSIUM SERPL-SCNC: 4.1 MMOL/L (ref 3.5–5.1)
PREALB SERPL-MCNC: 9 MG/DL (ref 20–43)
PROT SERPL-MCNC: 8.5 G/DL (ref 6–8.4)
PROTEUS SPECIES: NOT DETECTED
PROTHROMBIN TIME: 29.8 SEC (ref 9–12.5)
PSEUDOMONAS AERUGINOSA: NOT DETECTED
RBC # BLD AUTO: 4.05 M/UL (ref 4.6–6.2)
SALMONELLA SP: NOT DETECTED
SERRATIA MARCESCENS: NOT DETECTED
SODIUM SERPL-SCNC: 130 MMOL/L (ref 136–145)
STAPHYLOCOCCUS AUREUS: NOT DETECTED
STAPHYLOCOCCUS EPIDERMIDIS: NOT DETECTED
STAPHYLOCOCCUS LUGDUNESIS: NOT DETECTED
STAPHYLOCOCCUS SPECIES: NOT DETECTED
STENOTROPHOMONAS MALTOPHILIA: NOT DETECTED
STREPTOCOCCUS AGALACTIAE: NOT DETECTED
STREPTOCOCCUS PNEUMONIAE: NOT DETECTED
STREPTOCOCCUS PYOGENES: NOT DETECTED
STREPTOCOCCUS SPECIES: NOT DETECTED
VAN A/B (VRE GENE): NORMAL
VIM GENE (CARBAPENEM RESISTANT): NORMAL
WBC # BLD AUTO: 9.74 K/UL (ref 3.9–12.7)

## 2024-08-30 PROCEDURE — 86140 C-REACTIVE PROTEIN: CPT | Performed by: STUDENT IN AN ORGANIZED HEALTH CARE EDUCATION/TRAINING PROGRAM

## 2024-08-30 PROCEDURE — 85025 COMPLETE CBC W/AUTO DIFF WBC: CPT | Performed by: STUDENT IN AN ORGANIZED HEALTH CARE EDUCATION/TRAINING PROGRAM

## 2024-08-30 PROCEDURE — 83735 ASSAY OF MAGNESIUM: CPT | Performed by: STUDENT IN AN ORGANIZED HEALTH CARE EDUCATION/TRAINING PROGRAM

## 2024-08-30 PROCEDURE — 87106 FUNGI IDENTIFICATION YEAST: CPT | Performed by: STUDENT IN AN ORGANIZED HEALTH CARE EDUCATION/TRAINING PROGRAM

## 2024-08-30 PROCEDURE — 36415 COLL VENOUS BLD VENIPUNCTURE: CPT | Performed by: STUDENT IN AN ORGANIZED HEALTH CARE EDUCATION/TRAINING PROGRAM

## 2024-08-30 PROCEDURE — 85730 THROMBOPLASTIN TIME PARTIAL: CPT | Performed by: STUDENT IN AN ORGANIZED HEALTH CARE EDUCATION/TRAINING PROGRAM

## 2024-08-30 PROCEDURE — 93750 INTERROGATION VAD IN PERSON: CPT | Mod: ,,, | Performed by: INTERNAL MEDICINE

## 2024-08-30 PROCEDURE — 25000003 PHARM REV CODE 250: Performed by: STUDENT IN AN ORGANIZED HEALTH CARE EDUCATION/TRAINING PROGRAM

## 2024-08-30 PROCEDURE — 63600175 PHARM REV CODE 636 W HCPCS

## 2024-08-30 PROCEDURE — 63600175 PHARM REV CODE 636 W HCPCS: Performed by: STUDENT IN AN ORGANIZED HEALTH CARE EDUCATION/TRAINING PROGRAM

## 2024-08-30 PROCEDURE — 85610 PROTHROMBIN TIME: CPT | Performed by: STUDENT IN AN ORGANIZED HEALTH CARE EDUCATION/TRAINING PROGRAM

## 2024-08-30 PROCEDURE — 99232 SBSQ HOSP IP/OBS MODERATE 35: CPT | Mod: ,,, | Performed by: STUDENT IN AN ORGANIZED HEALTH CARE EDUCATION/TRAINING PROGRAM

## 2024-08-30 PROCEDURE — 83615 LACTATE (LD) (LDH) ENZYME: CPT | Performed by: STUDENT IN AN ORGANIZED HEALTH CARE EDUCATION/TRAINING PROGRAM

## 2024-08-30 PROCEDURE — 99233 SBSQ HOSP IP/OBS HIGH 50: CPT | Mod: ,,, | Performed by: INTERNAL MEDICINE

## 2024-08-30 PROCEDURE — 83880 ASSAY OF NATRIURETIC PEPTIDE: CPT | Performed by: STUDENT IN AN ORGANIZED HEALTH CARE EDUCATION/TRAINING PROGRAM

## 2024-08-30 PROCEDURE — 25000003 PHARM REV CODE 250: Performed by: INTERNAL MEDICINE

## 2024-08-30 PROCEDURE — 80048 BASIC METABOLIC PNL TOTAL CA: CPT | Performed by: STUDENT IN AN ORGANIZED HEALTH CARE EDUCATION/TRAINING PROGRAM

## 2024-08-30 PROCEDURE — 80076 HEPATIC FUNCTION PANEL: CPT | Performed by: STUDENT IN AN ORGANIZED HEALTH CARE EDUCATION/TRAINING PROGRAM

## 2024-08-30 PROCEDURE — 84100 ASSAY OF PHOSPHORUS: CPT | Performed by: STUDENT IN AN ORGANIZED HEALTH CARE EDUCATION/TRAINING PROGRAM

## 2024-08-30 PROCEDURE — 87040 BLOOD CULTURE FOR BACTERIA: CPT | Performed by: STUDENT IN AN ORGANIZED HEALTH CARE EDUCATION/TRAINING PROGRAM

## 2024-08-30 PROCEDURE — 99232 SBSQ HOSP IP/OBS MODERATE 35: CPT | Mod: ,,, | Performed by: NURSE PRACTITIONER

## 2024-08-30 PROCEDURE — 20600001 HC STEP DOWN PRIVATE ROOM

## 2024-08-30 PROCEDURE — 27000248 HC VAD-ADDITIONAL DAY

## 2024-08-30 PROCEDURE — 84134 ASSAY OF PREALBUMIN: CPT | Performed by: STUDENT IN AN ORGANIZED HEALTH CARE EDUCATION/TRAINING PROGRAM

## 2024-08-30 PROCEDURE — 25000003 PHARM REV CODE 250

## 2024-08-30 RX ORDER — WARFARIN 2 MG/1
2 TABLET ORAL
Status: DISCONTINUED | OUTPATIENT
Start: 2024-08-31 | End: 2024-09-04

## 2024-08-30 RX ORDER — WARFARIN 1 MG/1
1 TABLET ORAL
Status: DISCONTINUED | OUTPATIENT
Start: 2024-08-30 | End: 2024-08-30

## 2024-08-30 RX ORDER — WARFARIN 1 MG/1
1 TABLET ORAL
Status: DISCONTINUED | OUTPATIENT
Start: 2024-08-31 | End: 2024-09-04

## 2024-08-30 RX ORDER — HYDROCODONE BITARTRATE AND ACETAMINOPHEN 5; 325 MG/1; MG/1
1 TABLET ORAL ONCE
Status: COMPLETED | OUTPATIENT
Start: 2024-08-30 | End: 2024-08-30

## 2024-08-30 RX ADMIN — GABAPENTIN 400 MG: 400 CAPSULE ORAL at 09:08

## 2024-08-30 RX ADMIN — EPLERENONE 25 MG: 25 TABLET, FILM COATED ORAL at 08:08

## 2024-08-30 RX ADMIN — AMIODARONE HYDROCHLORIDE 400 MG: 200 TABLET ORAL at 08:08

## 2024-08-30 RX ADMIN — PANTOPRAZOLE SODIUM 40 MG: 40 TABLET, DELAYED RELEASE ORAL at 08:08

## 2024-08-30 RX ADMIN — ASPIRIN 81 MG: 81 TABLET, COATED ORAL at 08:08

## 2024-08-30 RX ADMIN — INSULIN ASPART 20 UNITS: 100 INJECTION, SOLUTION INTRAVENOUS; SUBCUTANEOUS at 07:08

## 2024-08-30 RX ADMIN — INSULIN ASPART 20 UNITS: 100 INJECTION, SOLUTION INTRAVENOUS; SUBCUTANEOUS at 11:08

## 2024-08-30 RX ADMIN — CEFAZOLIN 2 G: 2 INJECTION, POWDER, FOR SOLUTION INTRAMUSCULAR; INTRAVENOUS at 05:08

## 2024-08-30 RX ADMIN — Medication 400 MG: at 08:08

## 2024-08-30 RX ADMIN — ACETAMINOPHEN 650 MG: 325 TABLET ORAL at 08:08

## 2024-08-30 RX ADMIN — CEFAZOLIN 2 G: 2 INJECTION, POWDER, FOR SOLUTION INTRAMUSCULAR; INTRAVENOUS at 12:08

## 2024-08-30 RX ADMIN — HYDROCODONE BITARTRATE AND ACETAMINOPHEN 1 TABLET: 5; 325 TABLET ORAL at 07:08

## 2024-08-30 RX ADMIN — BUMETANIDE 2 MG: 1 TABLET ORAL at 08:08

## 2024-08-30 RX ADMIN — BUMETANIDE 2 MG: 1 TABLET ORAL at 04:08

## 2024-08-30 RX ADMIN — INSULIN GLARGINE 30 UNITS: 100 INJECTION, SOLUTION SUBCUTANEOUS at 07:08

## 2024-08-30 RX ADMIN — AMLODIPINE BESYLATE 5 MG: 5 TABLET ORAL at 08:08

## 2024-08-30 RX ADMIN — MICAFUNGIN SODIUM 100 MG: 100 INJECTION, POWDER, LYOPHILIZED, FOR SOLUTION INTRAVENOUS at 07:08

## 2024-08-30 RX ADMIN — GABAPENTIN 100 MG: 100 CAPSULE ORAL at 12:08

## 2024-08-30 RX ADMIN — DULOXETINE HYDROCHLORIDE 30 MG: 30 CAPSULE, DELAYED RELEASE ORAL at 08:08

## 2024-08-30 RX ADMIN — INSULIN ASPART 20 UNITS: 100 INJECTION, SOLUTION INTRAVENOUS; SUBCUTANEOUS at 04:08

## 2024-08-30 RX ADMIN — LEVETIRACETAM 1500 MG: 500 TABLET, FILM COATED ORAL at 08:08

## 2024-08-30 RX ADMIN — INSULIN ASPART 6 UNITS: 100 INJECTION, SOLUTION INTRAVENOUS; SUBCUTANEOUS at 07:08

## 2024-08-30 RX ADMIN — INSULIN GLARGINE 30 UNITS: 100 INJECTION, SOLUTION SUBCUTANEOUS at 10:08

## 2024-08-30 RX ADMIN — CEFAZOLIN 2 G: 2 INJECTION, POWDER, FOR SOLUTION INTRAMUSCULAR; INTRAVENOUS at 08:08

## 2024-08-30 RX ADMIN — GABAPENTIN 100 MG: 100 CAPSULE ORAL at 08:08

## 2024-08-30 RX ADMIN — ATORVASTATIN CALCIUM 40 MG: 20 TABLET, FILM COATED ORAL at 08:08

## 2024-08-30 NOTE — ASSESSMENT & PLAN NOTE
Seen on ct with contrast. ID following.   -cont abx per ID   -no need for percutaneous drain per IR

## 2024-08-30 NOTE — ASSESSMENT & PLAN NOTE
-CT chest/abdomen and pelvis w/ contrast w/ pyelonephritis and renal abscess   -WBC downtrending/normalized, likely a reaction to ovn episode of tachycardia.   -f/up cultures- +ve for staph, neg MRSA pcr   -ID following-initiating cefazolin   -removed PICC  -driveline infection vs pyelo

## 2024-08-30 NOTE — ASSESSMENT & PLAN NOTE
-HeartMate 3 Implanted  6/29/2022  as DT  -HTS Primary  -cont coumadin, Goal INR 2.0-3.0.   Lab Results   Component Value Date    INR 2.9 (H) 08/30/2024    INR 2.5 (H) 08/29/2024    INR 2.3 (H) 08/28/2024       -Antiplatelets ASA 81 mg  -LDH is stable overall today. Will continue to monitor daily.  -Speed set at 5100, LSL 4700 rpm  -Interrogation notable for  power cable disconnected, low voltage advisory, PI events  -Not listed for OHTx- advised pt he would not be candidate for OHTx (noncompliance/poor f/up)   -UDS negative  -attempted to call partner Robyn, unfortunately goes to        Procedure: Device Interrogation Including analysis of device parameters  Current Settings: Ventricular Assist Device  Review of device function is stable/unstable stable        8/30/2024    12:50 PM 8/30/2024     8:26 AM 8/30/2024     4:57 AM 8/29/2024     7:48 PM 8/29/2024     4:27 PM 8/29/2024    11:11 AM 8/29/2024     7:55 AM   TXP LVAD INTERROGATIONS   Type HeartMate3 HeartMate3 HeartMate3 HeartMate3 HeartMate3 HeartMate3 HeartMate3   Flow 4.3 4 4.1 4.1 4.2 4 3.8   Speed 5100 5100 5100 5150 5100 5100 5200   PI 4.7 5.9 5.4 4.8 4.7 4.8 5.1   Power (Serna) 3.6 3.5 3.6 3.6 3.6 3.6 3.5   LSL 4700 4700 4700 4700 4700 4700 4700   Pulsatility Intermittent pulse Intermittent pulse  Intermittent pulse Intermittent pulse Intermittent pulse Intermittent pulse

## 2024-08-30 NOTE — ASSESSMENT & PLAN NOTE
MSSA DLESI  Bacteremia  Pyelnephritis/renal abscess    I independently reviewed patient's lab work and images as documented. 40 yo male with LVAD HM3 as DT, prior MSSA DLESI/bacteremia c/b empyema (on suppressive cefadroxil) and eroded ICD/pocket infection s/p removal, seizure admitted for back pain. Hospital course notable for blood cx with micrococcus and PCR with Staph aureus (MRSA negative). DLES also with MSSA. Repeat blood cx no growth to date as of 8/28. CT with contrast now with irregular nonenhancing renal lesion c/f pyelonephritis or potential abscess, R chest lesion c/f gynecomastia. Has old PICC - though denies issues with it prior to admission, PICC pulled 8/29. TTE without IE.     Recommendations:  -cefazolin 2g q8hr IV   -repeated blood cx x 2 (ordered) post-line removal  -follow up cx data

## 2024-08-30 NOTE — PROGRESS NOTES
[facility] - Cardiology Stepdown  Infectious Disease  Progress Note    Patient Name: Kevan Queen  MRN: [id number]  Admission Date: 8/25/2024  Length of Stay: 5 days  Attending Physician: Dalia Crum MD  Primary Care Provider: Rosio Armendariz FNP    Isolation Status: No active isolations  Assessment/Plan:      Cardiac/Vascular  * LVAD (left ventricular assist device) present  MSSA DLESI  Bacteremia  Pyelnephritis/renal abscess    I independently reviewed patient's lab work and images as documented. 40 yo male with LVAD HM3 as DT, prior MSSA DLESI/bacteremia c/b empyema (on suppressive cefadroxil) and eroded ICD/pocket infection s/p removal, seizure admitted for back pain. Hospital course notable for blood cx with micrococcus and PCR with Staph aureus (MRSA negative). DLES also with MSSA. Repeat blood cx no growth to date as of 8/28. CT with contrast now with irregular nonenhancing renal lesion c/f pyelonephritis or potential abscess, R chest lesion c/f gynecomastia. Has old PICC - though denies issues with it prior to admission, PICC pulled 8/29. TTE without IE.     Recommendations:  -cefazolin 2g q8hr IV   -repeated blood cx x 2 (ordered) post-line removal  -follow up cx data            Thank you for your consult. I will follow-up with patient. Please contact us if you have any additional questions.    Laura Baldwin MD  Infectious Disease  [facility] - Cardiology Stepdown    Subjective:     Principal Problem:LVAD (left ventricular assist device) present    HPI: 40 yo male with LVAD HM3 as DT, prior MSSA DLESI/bacteremia c/b empyema (on suppressive cefadroxil) and eroded ICD/pocket infection s/p removal, seizure admitted for back pain. ID consulted for abx recs. Hospital course notable for US of chest with hypoechoic area of unclear significance. DLES cx in process. Pt reported adherence to cefadroxil - denied issues with it. Denied fevers, chills, abdominal complaints or worsening drainage from  KIM. Pt reported that he ran out of his primacor and had some back pain soon after. Denied problems with PICC. Reported swelling under R breast - pt states he is unclear how long he's had the swelling.            Interval History: No fevers documented overnight.   PICC pulled yesterday afternoon. Feeling a little better, still with some flank pain.     Review of Systems   Constitutional:  Negative for chills and fever.   Genitourinary:  Positive for flank pain. Negative for difficulty urinating and dysuria.   Musculoskeletal:  Positive for back pain (improving).   All other systems reviewed and are negative.    Objective:     Vital Signs (Most Recent):  Temp: 98 °F (36.7 °C) (08/30/24 0827)  Pulse: 88 (08/30/24 0827)  Resp: 16 (08/30/24 0827)  BP: (!) 80/0 (08/30/24 0827)  SpO2: 100 % (08/30/24 0827) Vital Signs (24h Range):  Temp:  [97.5 °F (36.4 °C)-98.5 °F (36.9 °C)] 98 °F (36.7 °C)  Pulse:  [] 88  Resp:  [16-18] 16  SpO2:  [98 %-100 %] 100 %  BP: ()/(0-75) 80/0     Weight: 68.5 kg (151 lb 0.2 oz)  Body mass index is 18.88 kg/m².    Estimated Creatinine Clearance: 73.9 mL/min (based on SCr of 1.3 mg/dL).     Physical Exam  Constitutional:       General: He is not in acute distress.     Appearance: He is not ill-appearing or toxic-appearing.   Eyes:      General:         Right eye: No discharge.         Left eye: No discharge.   Pulmonary:      Effort: Pulmonary effort is normal. No respiratory distress.   Abdominal:      General: There is no distension.      Palpations: Abdomen is soft.      Tenderness: There is right CVA tenderness. There is no left CVA tenderness.      Comments: LVAD dressed   Musculoskeletal:         General: No tenderness (no spinal tenderness).   Skin:     General: Skin is warm and dry.      Comments: L picc   Neurological:      Mental Status: He is alert and oriented to person, place, and time.          Significant Labs:   Microbiology Results (last 7 days)       Procedure  Component Value Units Date/Time    Blood culture [[id number]]     Order Status: Sent Specimen: Blood     Blood culture [[id number]]     Order Status: Sent Specimen: Blood     Blood culture [[id number]] Collected: 08/28/24 2226    Order Status: Completed Specimen: Blood Updated: 08/30/24 0613     Blood Culture, Routine No Growth to date      No Growth to date    Narrative:      Code 54956  Draw sample # 2 from separate site    Blood culture [[id number]] Collected: 08/28/24 2226    Order Status: Completed Specimen: Blood Updated: 08/30/24 0613     Blood Culture, Routine No Growth to date      No Growth to date    Narrative:      Code 10485  Draw sample # 2 from separate site    Aerobic culture [[id number]]  (Abnormal)  (Susceptibility) Collected: 08/27/24 1446    Order Status: Completed Specimen: Skin from Abdomen Updated: 08/29/24 1424     Aerobic Bacterial Culture STAPHYLOCOCCUS AUREUS  Rare      Narrative:      Driveline site    Blood culture [[id number]] Collected: 08/27/24 1751    Order Status: Completed Specimen: Blood Updated: 08/29/24 1350     Blood Culture, Routine Gram stain dudley bottle: Gram positive cocci in clusters resembling Staph      Results called to and read back by: Lorri Riggs RN  08/29/2024   13:50    Narrative:      Blood culture # 2 different location.    Blood culture [[id number]]  (Abnormal) Collected: 08/27/24 1802    Order Status: Completed Specimen: Blood Updated: 08/29/24 1014     Blood Culture, Routine Gram stain aer bottle: Gram positive cocci in clusters resembling Staph      Results called to and read back by:Miguelina Thomas RN 08/28/2024  22:59      MICROCOCCUS SPECIES  Organism is a probable contaminant      Culture, Anaerobe [[id number]] Collected: 08/27/24 1446    Order Status: Completed Specimen: Skin from Abdomen Updated: 08/29/24 0946     Anaerobic Culture Culture in progress    Narrative:      Driveline site    MRSA/SA Rapid ID by PCR from Blood culture [[id number]]  (Abnormal)  Collected: 08/27/24 1802    Order Status: Completed Updated: 08/29/24 0027     Staph aureus ID by PCR Positive     Methicillin Resistant ID by PCR Negative    Culture, Anaerobe [[id number]] Collected: 08/25/24 1733    Order Status: Completed Specimen: Wound from Abdomen Updated: 08/28/24 1010     Anaerobic Culture Culture in progress    Narrative:      Drive line exit site    Blood culture [[id number]]     Order Status: Canceled Specimen: Blood     Blood culture [[id number]]     Order Status: Canceled Specimen: Blood     Aerobic culture [[id number]] Collected: 08/25/24 1733    Order Status: Sent Specimen: Wound from Abdomen Updated: 08/25/24 1734            Significant Imaging: I have reviewed all pertinent imaging results/findings within the past 24 hours.

## 2024-08-30 NOTE — PROGRESS NOTES
08/30/2024  Michael Montes    Current provider:  Dalia Crum MD    Device interrogation:      8/30/2024     8:26 AM 8/30/2024     4:57 AM 8/29/2024     7:48 PM 8/29/2024     4:27 PM 8/29/2024    11:11 AM 8/29/2024     7:55 AM 8/29/2024     3:40 AM   TXP LVAD INTERROGATIONS   Type HeartMate3 HeartMate3 HeartMate3 HeartMate3 HeartMate3 HeartMate3 HeartMate3   Flow 4 4.1 4.1 4.2 4 3.8 3.6   Speed 5100 5100 5150 5100 5100 5200 5100   PI 5.9 5.4 4.8 4.7 4.8 5.1 5.8   Power (Serna) 3.5 3.6 3.6 3.6 3.6 3.5 3.6   LSL 4700 4700 4700 4700 4700 4700 4700   Pulsatility Intermittent pulse  Intermittent pulse Intermittent pulse Intermittent pulse Intermittent pulse Intermittent pulse          Rounded on Kevan Queen to ensure all mechanical assist device settings (IABP or VAD) were appropriate and all parameters were within limits.  I was able to ensure all back up equipment was present, the staff had no issues, and the Perfusion Department daily rounding was complete.      For implantable VADs: Interrogation of Ventricular assist device was performed with analysis of device parameters and review of device function. I have personally reviewed the interrogation findings and agree with findings as stated.     In emergency, the nursing units have been notified to contact the perfusion department either by:  Calling [phone] from 630am to 4pm Mon thru Fri, utilizing the On-Call Finder functionality of Epic and searching for Perfusion, or by contacting the hospital  from 4pm to 630am and on weekends and asking to speak with the perfusionist on call.    12:37 PM

## 2024-08-30 NOTE — PROGRESS NOTES
08/30/24 171   Critical Value Communication   Date Result Received 08/30/24   Time Result Received 1709   Resulting Department of Critical Value [facility]   Critical Test #1 Positive blood culture   Critical Test #1 Result positive for yeast (sample collected 8/28)   Name of Notified Physician/Designee Alice Ayala MD   Date Notified 08/30/24   Time Notified 0869

## 2024-08-30 NOTE — ASSESSMENT & PLAN NOTE
A1C 10.3  Endocrinology consulted for glucose management, largely uncontrolled       Lab Results   Component Value Date    HGBA1C 10.3 (H) 08/25/2024    HGBA1C >14.0 (H) 04/26/2024    HGBA1C >14.0 (H) 02/17/2024    [id number] 7.5 06/12/2023    [id number] 8.5 02/07/2023

## 2024-08-30 NOTE — SUBJECTIVE & OBJECTIVE
Interval History: No fevers documented overnight.   PICC pulled yesterday afternoon. Feeling a little better, still with some flank pain.     Review of Systems   Constitutional:  Negative for chills and fever.   Genitourinary:  Positive for flank pain. Negative for difficulty urinating and dysuria.   Musculoskeletal:  Positive for back pain (improving).   All other systems reviewed and are negative.    Objective:     Vital Signs (Most Recent):  Temp: 98 °F (36.7 °C) (08/30/24 0827)  Pulse: 88 (08/30/24 0827)  Resp: 16 (08/30/24 0827)  BP: (!) 80/0 (08/30/24 0827)  SpO2: 100 % (08/30/24 0827) Vital Signs (24h Range):  Temp:  [97.5 °F (36.4 °C)-98.5 °F (36.9 °C)] 98 °F (36.7 °C)  Pulse:  [] 88  Resp:  [16-18] 16  SpO2:  [98 %-100 %] 100 %  BP: ()/(0-75) 80/0     Weight: 68.5 kg (151 lb 0.2 oz)  Body mass index is 18.88 kg/m².    Estimated Creatinine Clearance: 73.9 mL/min (based on SCr of 1.3 mg/dL).     Physical Exam  Constitutional:       General: He is not in acute distress.     Appearance: He is not ill-appearing or toxic-appearing.   Eyes:      General:         Right eye: No discharge.         Left eye: No discharge.   Pulmonary:      Effort: Pulmonary effort is normal. No respiratory distress.   Abdominal:      General: There is no distension.      Palpations: Abdomen is soft.      Tenderness: There is right CVA tenderness. There is no left CVA tenderness.      Comments: LVAD dressed   Musculoskeletal:         General: No tenderness (no spinal tenderness).   Skin:     General: Skin is warm and dry.      Comments: L picc   Neurological:      Mental Status: He is alert and oriented to person, place, and time.          Significant Labs:   Microbiology Results (last 7 days)       Procedure Component Value Units Date/Time    Blood culture [[id number]]     Order Status: Sent Specimen: Blood     Blood culture [[id number]]     Order Status: Sent Specimen: Blood     Blood culture [[id number]] Collected:  08/28/24 2226    Order Status: Completed Specimen: Blood Updated: 08/30/24 0613     Blood Culture, Routine No Growth to date      No Growth to date    Narrative:      Code 04259  Draw sample # 2 from separate site    Blood culture [[id number]] Collected: 08/28/24 2226    Order Status: Completed Specimen: Blood Updated: 08/30/24 0613     Blood Culture, Routine No Growth to date      No Growth to date    Narrative:      Code 88634  Draw sample # 2 from separate site    Aerobic culture [[id number]]  (Abnormal)  (Susceptibility) Collected: 08/27/24 1446    Order Status: Completed Specimen: Skin from Abdomen Updated: 08/29/24 1424     Aerobic Bacterial Culture STAPHYLOCOCCUS AUREUS  Rare      Narrative:      Driveline site    Blood culture [[id number]] Collected: 08/27/24 1751    Order Status: Completed Specimen: Blood Updated: 08/29/24 1350     Blood Culture, Routine Gram stain dudley bottle: Gram positive cocci in clusters resembling Staph      Results called to and read back by: Lorri Riggs RN  08/29/2024   13:50    Narrative:      Blood culture # 2 different location.    Blood culture [[id number]]  (Abnormal) Collected: 08/27/24 1802    Order Status: Completed Specimen: Blood Updated: 08/29/24 1014     Blood Culture, Routine Gram stain aer bottle: Gram positive cocci in clusters resembling Staph      Results called to and read back by:Miguelina Thomas RN 08/28/2024  22:59      MICROCOCCUS SPECIES  Organism is a probable contaminant      Culture, Anaerobe [[id number]] Collected: 08/27/24 1446    Order Status: Completed Specimen: Skin from Abdomen Updated: 08/29/24 0946     Anaerobic Culture Culture in progress    Narrative:      Driveline site    MRSA/SA Rapid ID by PCR from Blood culture [[id number]]  (Abnormal) Collected: 08/27/24 1802    Order Status: Completed Updated: 08/29/24 0027     Staph aureus ID by PCR Positive     Methicillin Resistant ID by PCR Negative    Culture, Anaerobe [[id number]] Collected: 08/25/24  1733    Order Status: Completed Specimen: Wound from Abdomen Updated: 08/28/24 1010     Anaerobic Culture Culture in progress    Narrative:      Drive line exit site    Blood culture [[id number]]     Order Status: Canceled Specimen: Blood     Blood culture [[id number]]     Order Status: Canceled Specimen: Blood     Aerobic culture [[id number]] Collected: 08/25/24 1733    Order Status: Sent Specimen: Wound from Abdomen Updated: 08/25/24 1734            Significant Imaging: I have reviewed all pertinent imaging results/findings within the past 24 hours.

## 2024-08-30 NOTE — SUBJECTIVE & OBJECTIVE
"Interval HPI:   Overnight events: Remains in CSU. BG variable on current SQ insulin regimen (). Diet diabetic Cardiac (Low Na/Chol); 2000 Calorie; Fluid - 1500mL; Standard Tray    Eatin%  Nausea: No  Hypoglycemia and intervention: Yes, BG 61 on . Insulin held and patient ate dinner.   Fever: No  TPN and/or TF: No  If yes, type of TF/TPN and rate: n/a    BP (!) 80/0 (BP Location: Right arm, Patient Position: Lying)   Pulse 88   Temp 98 °F (36.7 °C) (Oral)   Resp 16   Ht 6' 3" (1.905 m)   Wt 68.5 kg (151 lb 0.2 oz)   SpO2 100%   BMI 18.88 kg/m²     Labs Reviewed and Include    Recent Labs   Lab 24  0308   *   CALCIUM 9.1   ALBUMIN 2.5*   PROT 8.5*   *   K 4.1   CO2 25   CL 95   BUN 21*   CREATININE 1.3   ALKPHOS 221*   ALT 11   AST 31   BILITOT 2.0*     Lab Results   Component Value Date    WBC 9.74 2024    HGB 8.0 (L) 2024    HCT 26.7 (L) 2024    MCV 66 (L) 2024     2024     Recent Labs   Lab 24  1058   TSH 2.009     Lab Results   Component Value Date    HGBA1C 10.3 (H) 2024       Nutritional status:   Body mass index is 18.88 kg/m².  Lab Results   Component Value Date    ALBUMIN 2.5 (L) 2024    ALBUMIN 2.4 (L) 2024    ALBUMIN 2.2 (L) 2024     Lab Results   Component Value Date    PREALBUMIN 9 (L) 2024    PREALBUMIN 8 (L) 2024    PREALBUMIN 7 (L) 2024       Estimated Creatinine Clearance: 73.9 mL/min (based on SCr of 1.3 mg/dL).    Accu-Checks  Recent Labs     24  0625 24  1056 24  1720 24  1942 24  0609 24  1140 24  1607 24  1953 24  0629 24  0740   POCTGLUCOSE 279* 432* 70 129* 238* 146* 61* 256* 300* 259*       Current Medications and/or Treatments Impacting Glycemic Control  Immunotherapy:    Immunosuppressants       None          Steroids:   Hormones (From admission, onward)      None          Pressors:    Autonomic Drugs " Upon review of the In Basket request we were able to note that no further action is required  The patient chart is up to date as a result of a previous request       Any additional questions or concerns should be emailed to the Practice Liaisons via [email or web address] email, please do not reply via In Basket      Thank you  Toña Nielsen (From admission, onward)      None          Hyperglycemia/Diabetes Medications:   Antihyperglycemics (From admission, onward)      Start     Stop Route Frequency Ordered    08/28/24 0900  insulin glargine U-100 (Lantus) pen 30 Units         -- SubQ 2 times daily 08/28/24 0721    08/27/24 1130  insulin aspart U-100 pen 20 Units         -- SubQ 3 times daily with meals 08/27/24 0819    08/25/24 1506  insulin aspart U-100 pen 0-10 Units         -- SubQ Before meals & nightly PRN 08/25/24 1407

## 2024-08-30 NOTE — ASSESSMENT & PLAN NOTE
Pt reports initial pain following running out of primacor. Does have intermittent chest pain/pressure with straining on the toilet as well but no radiation of pain or issues with nausea/vomiting/diaphoresis/sob with symptoms. S/p spinal Xrays without significant abnormality.   NO red flag symptoms on admission  C/w home gabapentin, added tylenol prn and lidocaine patches   Back pain more pronounced on R side, most likely cause is his pyelonephritis

## 2024-08-30 NOTE — PROGRESS NOTES
[facility] - [facility] Step[facility]  Endocrinology  Progress Note    Admit Date: 8/25/2024     Reason for Consult: Management of T2DM, Hyperglycemia     Surgical Procedure and Date: LVAD 06/29/2022     Diabetes diagnosis year: 2022    Home Diabetes Medications:  Levemir 20 units twice daily and Novolog SSI (150/25) per patient    How often checking glucose at home?  Has not checked in a few weeks due to running out of strips    BG readings on regimen: COLLIN  Hypoglycemia on the regimen?  Yes; rarely experiences hypoglycemic symptoms such as blurry vision and vomiting. However, does not know his blood sugar level due to running out of supplies. He will self-correct with a snack.  Missed doses on regimen?  Yes, has not had insulin in a few weeks due to running out of glucometer supplies.    Diabetes Complications include:   Hyperglycemia    Complicating diabetes co morbidities:   History of MI, CHF, and CKD      HPI:   Patient is a 39 y.o. 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 He underwent RVAD removal and chest closure 6/30/2022.  He was weaned off  but restarted due to RVF. He was transitioned to milrinone (secondary to  shortage). History of unclear syncope vs seizures and followed by neuro and is on Keppra. On 11/15/23 underwent removal of eroded [employer] scICD--no Lifevest (due to LVAD) and no plan to replace ICD as not requiring therapy post LVAD with concern for reinfection. Reported back pain (primarily upper back pain) for the past 3-4 days. He reported that it started 2 days after stopping pirmacor and he felt it may be related. Reported some shortness of breath after walking long distances which has been ongoing for months now and has not changed lately. He has lost some weight over last few months since his  "but weight has been stable over the last month. Patient admitted after being out of [facility] for 1 week with ongoing back pain. Patient stated he has not taken his insulin in a few weeks due to running out of glucometer testing supplies. He previously had a William, but is not wearing one. BG >[facility] at [facility]. Endo consulted for BG management.      Interval HPI:   Overnight events: Remains in CSU. BG variable on current SQ insulin regimen (). Diet diabetic Cardiac (Low Na/Chol); 2000 Calorie; Fluid - 1500mL; Standard Tray    Eatin%  Nausea: No  Hypoglycemia and intervention: Yes, BG 61 on . Insulin held and patient ate dinner.   Fever: No  TPN and/or TF: No  If yes, type of TF/TPN and rate: n/a    BP (!) 80/0 (BP Location: Right arm, Patient Position: Lying)   Pulse 88   Temp 98 °F (36.7 °C) (Oral)   Resp 16   Ht 6' 3" (1.905 m)   Wt 68.5 kg (151 lb 0.2 oz)   SpO2 100%   BMI 18.88 kg/m²     Labs Reviewed and Include    Recent Labs   Lab 24  0308   *   CALCIUM 9.1   ALBUMIN 2.5*   PROT 8.5*   *   K 4.1   CO2 25   CL 95   BUN 21*   CREATININE 1.3   ALKPHOS 221*   ALT 11   AST 31   BILITOT 2.0*     Lab Results   Component Value Date    WBC 9.74 2024    HGB 8.0 (L) 2024    HCT 26.7 (L) 2024    MCV 66 (L) 2024     2024     Recent Labs   Lab 24  1058   TSH 2.009     Lab Results   Component Value Date    HGBA1C 10.3 (H) 2024       Nutritional status:   Body mass index is 18.88 kg/m².  Lab Results   Component Value Date    ALBUMIN 2.5 (L) 2024    ALBUMIN 2.4 (L) 2024    ALBUMIN 2.2 (L) 2024     Lab Results   Component Value Date    PREALBUMIN 9 (L) 2024    PREALBUMIN 8 (L) 2024    PREALBUMIN 7 (L) 2024       Estimated Creatinine Clearance: 73.9 mL/min (based on SCr of 1.3 mg/dL).    Accu-Checks  Recent Labs     24  0625 24  1056 24  1720 24  1942 24  0609 " 08/29/24  1140 08/29/24  1607 08/29/24  1953 08/30/24  0629 08/30/24  0740   POCTGLUCOSE 279* 432* 70 129* 238* 146* 61* 256* 300* 259*       Current Medications and/or Treatments Impacting Glycemic Control  Immunotherapy:    Immunosuppressants       None          Steroids:   Hormones (From admission, onward)      None          Pressors:    Autonomic Drugs (From admission, onward)      None          Hyperglycemia/Diabetes Medications:   Antihyperglycemics (From admission, onward)      Start     Stop Route Frequency Ordered    08/28/24 0900  insulin glargine U-100 (Lantus) pen 30 Units         -- SubQ 2 times daily 08/28/24 0721    08/27/24 1130  insulin aspart U-100 pen 20 Units         -- SubQ 3 times daily with meals 08/27/24 0819    08/25/24 1506  insulin aspart U-100 pen 0-10 Units         -- SubQ Before meals & nightly PRN 08/25/24 1407            ASSESSMENT and PLAN    Cardiac/Vascular  * LVAD (left ventricular assist device) present  Managed by primary team  Optimize blood glucose control        CHF (NYHA class IV, ACC/AHA stage D)  Managed by primary team  Optimize blood glucose control      Endocrine  Type 2 diabetes mellitus with hyperglycemia, with long-term current use of insulin  BG goal 140-180  Noncompliant T2DM.     Patient having significant variability in BG readings. Will monitor on current regimen and consider reducing scheduled Novolog dose at lunchtime if hypoglycemia noted again this evening. Spoke with patient about importance of adherence to ADA diet.     Plan:   - Continue Lantus (Insulin Glargine) 30 units BID   - Continue Novolog (Insulin Aspart) 20 units TIDWM.  - Continue moderate dose correction (150/25)  - BG checks AC/HS  - Hypoglycemia protocol in place    ** Please notify Endocrine for any change and/or advance in diet**  ** Please call Endocrine for any BG related issues **    Discharge Planning:   TBD. Please notify endocrinology prior to discharge.  Patient requires glucometer  and testing supplies.  Recommend he resumes his William for blood sugar monitoring.          Jody Starkey NP  Endocrinology  [facility] - [facility] Stepdown

## 2024-08-30 NOTE — SUBJECTIVE & OBJECTIVE
Interval History:   Pt doing well overnight, still denies any chest pain, shortness of breath, lightheadedness, dizziness, fevers or chills overnight. Cont to have back pain and notably has r sided cva tenderness. No overlying skin changes, no wbc count overnight. D/w IR, no need for drain placement at this time and risk outweighs benefit given INR/on therapeutic a/c for VAD. Even with normal INR, not a large target at this time for need for procedure.       Second blood culture positive for s. Aureus, picc removed.     Responding well to 2 mg of Bumex for diuresis, no issue any allergic reaction.  Kidney function at baseline and euvolemic/ no JVD. No abdominal distension, abdomen soft.    Continuous Infusions: none       Scheduled Meds:   [START ON 9/9/2024] amiodarone  200 mg Oral Daily    amiodarone  400 mg Oral BID    amLODIPine  5 mg Oral Daily    aspirin  81 mg Oral Daily    atorvastatin  40 mg Oral Daily    bumetanide  2 mg Oral BID loop    ceFAZolin (Ancef) IV (PEDS and ADULTS)  2 g Intravenous Q8H    DULoxetine  30 mg Oral Daily    eplerenone  25 mg Oral Daily    gabapentin  100 mg Oral BID    gabapentin  400 mg Oral QHS    insulin aspart U-100  20 Units Subcutaneous TIDWM    insulin glargine U-100  30 Units Subcutaneous BID    levETIRAcetam  1,500 mg Oral BID    LIDOcaine  2 patch Transdermal Q24H    magnesium oxide  400 mg Oral Daily    nicotine  1 patch Transdermal Daily    pantoprazole  40 mg Oral Daily    [START ON 8/31/2024] warfarin  1 mg Oral Every Mon, Wed, Fri    [START ON 8/31/2024] warfarin  2 mg Oral Every Tues, Thurs, Sat, Sun     PRN Meds:  Current Facility-Administered Medications:     acetaminophen, 650 mg, Oral, Q6H PRN    cyclobenzaprine, 5 mg, Oral, Daily PRN    dextrose 10%, 12.5 g, Intravenous, PRN    dextrose 10%, 25 g, Intravenous, PRN    glucagon (human recombinant), 1 mg, Intramuscular, PRN    glucose, 16 g, Oral, PRN    glucose, 24 g, Oral, PRN    insulin aspart U-100, 0-10  Units, Subcutaneous, QID (AC + HS) PRN    ondansetron, 4 mg, Oral, Q8H PRN    Review of patient's allergies indicates:   Allergen Reactions    Torsemide Hives     Objective:     Vital Signs (Most Recent):  Temp: 97.7 °F (36.5 °C) (08/30/24 1115)  Pulse: 88 (08/30/24 1224)  Resp: 18 (08/30/24 1115)  BP: (!) 76/0 (08/30/24 1115)  SpO2: 99 % (08/30/24 1115) Vital Signs (24h Range):  Temp:  [97.5 °F (36.4 °C)-98.5 °F (36.9 °C)] 97.7 °F (36.5 °C)  Pulse:  [] 88  Resp:  [16-18] 18  SpO2:  [98 %-100 %] 99 %  BP: ()/(0-75) 76/0     Patient Vitals for the past 72 hrs (Last 3 readings):   Weight   08/28/24 0330 68.5 kg (151 lb 0.2 oz)     Body mass index is 18.88 kg/m².      Intake/Output Summary (Last 24 hours) at 8/30/2024 1504  Last data filed at 8/30/2024 1100  Gross per 24 hour   Intake 1886 ml   Output 1875 ml   Net 11 ml       Hemodynamic Parameters:       EKG- without ischemic changes this AM        Physical Exam  Constitutional:       General: He is not in acute distress.     Appearance: He is normal weight.   HENT:      Head: Normocephalic and atraumatic.      Right Ear: External ear normal.      Left Ear: External ear normal.      Nose: Nose normal.      Mouth/Throat:      Pharynx: Oropharynx is clear.   Cardiovascular:      Comments: Normal VAD hum, JVD present but improving   Pulmonary:      Effort: Pulmonary effort is normal.   Abdominal:      General: Abdomen is flat. Bowel sounds are normal. There is no distension (Distended but soft.).      Palpations: Abdomen is soft. There is no mass.      Tenderness: There is no abdominal tenderness. There is right CVA tenderness.   Musculoskeletal:      Cervical back: Normal range of motion.      Right lower leg: No edema.      Left lower leg: No edema.      Comments: Cont b/l back pain- upper spine and b/l paraspinal/upper back muscular pain.    Skin:     General: Skin is warm.      Comments: Chest wall on the right side with gynecomastia, no overlying skin  changes, no tenderness at the site however palpable mass versus nodule present.   Neurological:      Mental Status: He is alert. Mental status is at baseline.            Significant Labs:  CBC:  Recent Labs   Lab 08/28/24 0345 08/29/24 0346 08/30/24  0308   WBC 11.66 10.09 9.74   RBC 4.21* 4.01* 4.05*   HGB 8.0* 7.8* 8.0*   HCT 27.2* 27.2* 26.7*    335 427   MCV 65* 68* 66*   MCH 19.0* 19.5* 19.8*   MCHC 29.4* 28.7* 30.0*     BNP:  Recent Labs   Lab 08/26/24  0508 08/28/24 0345 08/30/24  0308   * 376* 462*     CMP:  Recent Labs   Lab 08/26/24  0508 08/26/24  2221 08/28/24 0345 08/29/24 0346 08/30/24  0308   *   < > 219* 170* 287*   CALCIUM 8.8   < > 9.0 9.2 9.1   ALBUMIN 2.2*  --  2.4*  --  2.5*   PROT 7.4  --  7.6  --  8.5*   *   < > 131* 129* 130*   K 4.0   < > 3.6 3.6 4.1   CO2 29   < > 26 26 25   CL 94*   < > 92* 93* 95   BUN 13   < > 19 20 21*   CREATININE 1.0   < > 1.1 1.1 1.3   ALKPHOS 199*  --  215*  --  221*   ALT 13  --  13  --  11   AST 23  --  24  --  31   BILITOT 2.2*  --  2.4*  --  2.0*    < > = values in this interval not displayed.      Coagulation:   Recent Labs   Lab 08/28/24 0345 08/29/24 0346 08/30/24  0308   INR 2.3* 2.5* 2.9*   APTT 43.1* 39.8* 40.1*     LDH:  Recent Labs   Lab 08/28/24 0345 08/29/24 0346 08/30/24  0308   * 288* 255     Microbiology:  Microbiology Results (last 7 days)       Procedure Component Value Units Date/Time    Blood culture [[id number]]  (Abnormal) Collected: 08/27/24 1802    Order Status: Completed Specimen: Blood Updated: 08/30/24 1430     Blood Culture, Routine Gram stain aer bottle: Gram positive cocci in clusters resembling Staph      Results called to and read back by:Miguelina [facility] RN 08/28/2024  22:59      MICROCOCCUS SPECIES  Organism is a probable contaminant        STAPHYLOCOCCUS AUREUS  ID consult required at [facility] [facility].      Culture, Anaerobe [[id number]] Collected: 08/25/24 4420     Order Status: Completed Specimen: Wound from Abdomen Updated: 08/30/24 1405     Anaerobic Culture No anaerobes isolated    Narrative:      Drive line exit site    Blood culture [[id number]] Collected: 08/30/24 1152    Order Status: Sent Specimen: Blood Updated: 08/30/24 1244    Narrative:      Lft AC    Blood culture [[id number]] Collected: 08/30/24 1152    Order Status: Sent Specimen: Blood Updated: 08/30/24 1244    Narrative:      Rt AC    Blood culture [[id number]] Collected: 08/27/24 1751    Order Status: Completed Specimen: Blood Updated: 08/30/24 0955     Blood Culture, Routine Gram stain dudley bottle: Gram positive cocci in clusters resembling Staph      Results called to and read back by: Lorri Riggs RN  08/29/2024   13:50    Narrative:      Blood culture # 2 different location.    Blood culture [[id number]] Collected: 08/28/24 2226    Order Status: Completed Specimen: Blood Updated: 08/30/24 0613     Blood Culture, Routine No Growth to date      No Growth to date    Narrative:      Code 91361  Draw sample # 2 from separate site    Blood culture [[id number]] Collected: 08/28/24 2226    Order Status: Completed Specimen: Blood Updated: 08/30/24 0613     Blood Culture, Routine No Growth to date      No Growth to date    Narrative:      Code 48728  Draw sample # 2 from separate site    Aerobic culture [[id number]]  (Abnormal)  (Susceptibility) Collected: 08/27/24 1446    Order Status: Completed Specimen: Skin from Abdomen Updated: 08/29/24 1424     Aerobic Bacterial Culture STAPHYLOCOCCUS AUREUS  Rare      Narrative:      Driveline site    Culture, Anaerobe [[id number]] Collected: 08/27/24 1446    Order Status: Completed Specimen: Skin from Abdomen Updated: 08/29/24 0946     Anaerobic Culture Culture in progress    Narrative:      Driveline site    MRSA/SA Rapid ID by PCR from Blood culture [[id number]]  (Abnormal) Collected: 08/27/24 1802    Order Status: Completed Updated: 08/29/24 0027     Staph aureus ID by  PCR Positive     Methicillin Resistant ID by PCR Negative    Blood culture [[id number]]     Order Status: Canceled Specimen: Blood     Blood culture [[id number]]     Order Status: Canceled Specimen: Blood     Aerobic culture [[id number]] Collected: 08/25/24 1733    Order Status: Sent Specimen: Wound from Abdomen Updated: 08/25/24 1734            I have reviewed all pertinent labs within the past 24 hours.    Estimated Creatinine Clearance: 73.9 mL/min (based on SCr of 1.3 mg/dL).    Diagnostic Results:  I have reviewed and interpreted all pertinent imaging results/findings within the past 24 hours.

## 2024-08-30 NOTE — PROGRESS NOTES
[facility] - Cardiology Stepdown  Heart Transplant  Progress Note    Patient Name: Kevan Queen  MRN: [id number]  Admission Date: 8/25/2024  Hospital Length of Stay: 5 days  Attending Physician: Dalia Crum MD  Primary Care Provider: Rosio Armendariz FNP  Principal Problem:LVAD (left ventricular assist device) present    Subjective:     Interval History:   Pt doing well overnight, still denies any chest pain, shortness of breath, lightheadedness, dizziness, fevers or chills overnight. Cont to have back pain and notably has r sided cva tenderness. No overlying skin changes, no wbc count overnight. D/w IR, no need for drain placement at this time and risk outweighs benefit given INR/on therapeutic a/c for VAD. Even with normal INR, not a large target at this time for need for procedure.       Second blood culture positive for s. Aureus, picc removed.     Responding well to 2 mg of Bumex for diuresis, no issue any allergic reaction.  Kidney function at baseline and euvolemic/ no JVD. No abdominal distension, abdomen soft.    Continuous Infusions: none       Scheduled Meds:   [START ON 9/9/2024] amiodarone  200 mg Oral Daily    amiodarone  400 mg Oral BID    amLODIPine  5 mg Oral Daily    aspirin  81 mg Oral Daily    atorvastatin  40 mg Oral Daily    bumetanide  2 mg Oral BID loop    ceFAZolin (Ancef) IV (PEDS and ADULTS)  2 g Intravenous Q8H    DULoxetine  30 mg Oral Daily    eplerenone  25 mg Oral Daily    gabapentin  100 mg Oral BID    gabapentin  400 mg Oral QHS    insulin aspart U-100  20 Units Subcutaneous TIDWM    insulin glargine U-100  30 Units Subcutaneous BID    levETIRAcetam  1,500 mg Oral BID    LIDOcaine  2 patch Transdermal Q24H    magnesium oxide  400 mg Oral Daily    nicotine  1 patch Transdermal Daily    pantoprazole  40 mg Oral Daily    [START ON 8/31/2024] warfarin  1 mg Oral Every Mon, Wed, Fri    [START ON 8/31/2024] warfarin  2 mg Oral Every Tues, Thurs, Sat, Sun     PRN Meds:  Current  Facility-Administered Medications:     acetaminophen, 650 mg, Oral, Q6H PRN    cyclobenzaprine, 5 mg, Oral, Daily PRN    dextrose 10%, 12.5 g, Intravenous, PRN    dextrose 10%, 25 g, Intravenous, PRN    glucagon (human recombinant), 1 mg, Intramuscular, PRN    glucose, 16 g, Oral, PRN    glucose, 24 g, Oral, PRN    insulin aspart U-100, 0-10 Units, Subcutaneous, QID (AC + HS) PRN    ondansetron, 4 mg, Oral, Q8H PRN    Review of patient's allergies indicates:   Allergen Reactions    Torsemide Hives     Objective:     Vital Signs (Most Recent):  Temp: 97.7 °F (36.5 °C) (08/30/24 1115)  Pulse: 88 (08/30/24 1224)  Resp: 18 (08/30/24 1115)  BP: (!) 76/0 (08/30/24 1115)  SpO2: 99 % (08/30/24 1115) Vital Signs (24h Range):  Temp:  [97.5 °F (36.4 °C)-98.5 °F (36.9 °C)] 97.7 °F (36.5 °C)  Pulse:  [] 88  Resp:  [16-18] 18  SpO2:  [98 %-100 %] 99 %  BP: ()/(0-75) 76/0     Patient Vitals for the past 72 hrs (Last 3 readings):   Weight   08/28/24 0330 68.5 kg (151 lb 0.2 oz)     Body mass index is 18.88 kg/m².      Intake/Output Summary (Last 24 hours) at 8/30/2024 1504  Last data filed at 8/30/2024 1100  Gross per 24 hour   Intake 1886 ml   Output 1875 ml   Net 11 ml       Hemodynamic Parameters:       EKG- without ischemic changes this AM        Physical Exam  Constitutional:       General: He is not in acute distress.     Appearance: He is normal weight.   HENT:      Head: Normocephalic and atraumatic.      Right Ear: External ear normal.      Left Ear: External ear normal.      Nose: Nose normal.      Mouth/Throat:      Pharynx: Oropharynx is clear.   Cardiovascular:      Comments: Normal VAD hum, JVD present but improving   Pulmonary:      Effort: Pulmonary effort is normal.   Abdominal:      General: Abdomen is flat. Bowel sounds are normal. There is no distension (Distended but soft.).      Palpations: Abdomen is soft. There is no mass.      Tenderness: There is no abdominal tenderness. There is right CVA  tenderness.   Musculoskeletal:      Cervical back: Normal range of motion.      Right lower leg: No edema.      Left lower leg: No edema.      Comments: Cont b/l back pain- upper spine and b/l paraspinal/upper back muscular pain.    Skin:     General: Skin is warm.      Comments: Chest wall on the right side with gynecomastia, no overlying skin changes, no tenderness at the site however palpable mass versus nodule present.   Neurological:      Mental Status: He is alert. Mental status is at baseline.            Significant Labs:  CBC:  Recent Labs   Lab 08/28/24 0345 08/29/24 0346 08/30/24  0308   WBC 11.66 10.09 9.74   RBC 4.21* 4.01* 4.05*   HGB 8.0* 7.8* 8.0*   HCT 27.2* 27.2* 26.7*    335 427   MCV 65* 68* 66*   MCH 19.0* 19.5* 19.8*   MCHC 29.4* 28.7* 30.0*     BNP:  Recent Labs   Lab 08/26/24  0508 08/28/24 0345 08/30/24  0308   * 376* 462*     CMP:  Recent Labs   Lab 08/26/24  0508 08/26/24  2221 08/28/24 0345 08/29/24 0346 08/30/24  0308   *   < > 219* 170* 287*   CALCIUM 8.8   < > 9.0 9.2 9.1   ALBUMIN 2.2*  --  2.4*  --  2.5*   PROT 7.4  --  7.6  --  8.5*   *   < > 131* 129* 130*   K 4.0   < > 3.6 3.6 4.1   CO2 29   < > 26 26 25   CL 94*   < > 92* 93* 95   BUN 13   < > 19 20 21*   CREATININE 1.0   < > 1.1 1.1 1.3   ALKPHOS 199*  --  215*  --  221*   ALT 13  --  13  --  11   AST 23  --  24  --  31   BILITOT 2.2*  --  2.4*  --  2.0*    < > = values in this interval not displayed.      Coagulation:   Recent Labs   Lab 08/28/24  0345 08/29/24 0346 08/30/24  0308   INR 2.3* 2.5* 2.9*   APTT 43.1* 39.8* 40.1*     LDH:  Recent Labs   Lab 08/28/24  0345 08/29/24  0346 08/30/24  0308   * 288* 255     Microbiology:  Microbiology Results (last 7 days)       Procedure Component Value Units Date/Time    Blood culture [[id number]]  (Abnormal) Collected: 08/27/24 1802    Order Status: Completed Specimen: Blood Updated: 08/30/24 1430     Blood Culture, Routine Gram stain aer bottle:  Gram positive cocci in clusters resembling Staph      Results called to and read back by:Miguelina [facility] RN 08/28/2024  22:59      MICROCOCCUS SPECIES  Organism is a probable contaminant        STAPHYLOCOCCUS AUREUS  ID consult required at [facility] and [facility] locations.      Culture, Anaerobe [[id number]] Collected: 08/25/24 1733    Order Status: Completed Specimen: Wound from Abdomen Updated: 08/30/24 1405     Anaerobic Culture No anaerobes isolated    Narrative:      Drive line exit site    Blood culture [[id number]] Collected: 08/30/24 1152    Order Status: Sent Specimen: Blood Updated: 08/30/24 1244    Narrative:      Lft AC    Blood culture [[id number]] Collected: 08/30/24 1152    Order Status: Sent Specimen: Blood Updated: 08/30/24 1244    Narrative:      Rt AC    Blood culture [[id number]] Collected: 08/27/24 1751    Order Status: Completed Specimen: Blood Updated: 08/30/24 0955     Blood Culture, Routine Gram stain dudley bottle: Gram positive cocci in clusters resembling Staph      Results called to and read back by: Lorri Riggs RN  08/29/2024   13:50    Narrative:      Blood culture # 2 different location.    Blood culture [[id number]] Collected: 08/28/24 2226    Order Status: Completed Specimen: Blood Updated: 08/30/24 0613     Blood Culture, Routine No Growth to date      No Growth to date    Narrative:      Code 78239  Draw sample # 2 from separate site    Blood culture [[id number]] Collected: 08/28/24 2226    Order Status: Completed Specimen: Blood Updated: 08/30/24 0613     Blood Culture, Routine No Growth to date      No Growth to date    Narrative:      Code [id number]  Draw sample # 2 from separate site    Aerobic culture [[id number]]  (Abnormal)  (Susceptibility) Collected: 08/27/24 1446    Order Status: Completed Specimen: Skin from Abdomen Updated: 08/29/24 1424     Aerobic Bacterial Culture STAPHYLOCOCCUS AUREUS  Rare      Narrative:      Driveline site    Culture, Anaerobe [[id number]]  Collected: 08/27/24 1446    Order Status: Completed Specimen: Skin from Abdomen Updated: 08/29/24 0946     Anaerobic Culture Culture in progress    Narrative:      Driveline site    MRSA/SA Rapid ID by PCR from Blood culture [[id number]]  (Abnormal) Collected: 08/27/24 1802    Order Status: Completed Updated: 08/29/24 0027     Staph aureus ID by PCR Positive     Methicillin Resistant ID by PCR Negative    Blood culture [[id number]]     Order Status: Canceled Specimen: Blood     Blood culture [[id number]]     Order Status: Canceled Specimen: Blood     Aerobic culture [[id number]] Collected: 08/25/24 1733    Order Status: Sent Specimen: Wound from Abdomen Updated: 08/25/24 1734            I have reviewed all pertinent labs within the past 24 hours.    Estimated Creatinine Clearance: 73.9 mL/min (based on SCr of 1.3 mg/dL).    Diagnostic Results:  I have reviewed and interpreted all pertinent imaging results/findings within the past 24 hours.  Assessment and Plan:     Mr. Kevan Thacker is a 39 year old male with stage D 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 He underwent RVAD removal and chest closure 6/30/2022.  He was weaned off  but he had to restarted due to RVF. He was eventually transitioned to milrinone (secondary to  shortage) now supposed to be on 0.25 mcg/kg/min. He has a history of unclear syncope vs seizures and is followed by neuro for this and is on Keppra.  He is being admitted after being out of primacor for 1 week with ongoing back pain.      On 11/15/23 underwent removal of eroded [employer] scICD--no Lifevest (due to LVAD) and no plan to replace ICD as not requiring therapy post LVAD with concern for reinfection.  He has not had neurology f/u with  seizure hx on keppra so coordinator to assist him with obtaining appt.      Reports back pain (primarily upper back pain) for the past 3-4 days. He reports that it started 2 days after stopping pirmacor and he feels it may be related. He has also been sleeping in the couch and identifies that it may also be related to the back pain. He denies lifting anything heavy or any falls /trauma. No red flags including incontinence of stool or urine. No numbness in the groin area or weakness in the legs or upper extremity reported. He reports he was not able to go for appointment because of transport issue that has now been resolved. He denies orthopnea, PND, weight gain, leg swelling. He says that he does have some shortness of breath after walking long distances which has been ongoing for months now and has not changed lately. He has lost some weight over last few months since his but weight has been stable over the last month. He denied headache, constipation, diarrhea, SOB, cough, palpitations or any additional symptoms.     * LVAD (left ventricular assist device) present  -HeartMate 3 Implanted  6/29/2022  as DT  -HTS Primary  -cont coumadin, Goal INR 2.0-3.0.   Lab Results   Component Value Date    INR 2.9 (H) 08/30/2024    INR 2.5 (H) 08/29/2024    INR 2.3 (H) 08/28/2024       -Antiplatelets ASA 81 mg  -LDH is stable overall today. Will continue to monitor daily.  -Speed set at 5100, LSL 4700 rpm  -Interrogation notable for  power cable disconnected, low voltage advisory, PI events  -Not listed for OHTx- advised pt he would not be candidate for OHTx (noncompliance/poor f/up)   -UDS negative  -attempted to call partner Robyn, unfortunately goes to        Procedure: Device Interrogation Including analysis of device parameters  Current Settings: Ventricular Assist Device  Review of device function is stable/unstable stable        8/30/2024    12:50 PM 8/30/2024     8:26 AM 8/30/2024     4:57 AM 8/29/2024     7:48 PM  8/29/2024     4:27 PM 8/29/2024    11:11 AM 8/29/2024     7:55 AM   TXP LVAD INTERROGATIONS   Type HeartMate3 HeartMate3 HeartMate3 HeartMate3 HeartMate3 HeartMate3 HeartMate3   Flow 4.3 4 4.1 4.1 4.2 4 3.8   Speed 5100 5100 5100 5150 5100 5100 5200   PI 4.7 5.9 5.4 4.8 4.7 4.8 5.1   Power (Serna) 3.6 3.5 3.6 3.6 3.6 3.6 3.5   LSL 4700 4700 4700 4700 4700 4700 4700   Pulsatility Intermittent pulse Intermittent pulse  Intermittent pulse Intermittent pulse Intermittent pulse Intermittent pulse         Severe sepsis  -CT chest/abdomen and pelvis w/ contrast w/ pyelonephritis and renal abscess   -WBC downtrending/normalized, likely a reaction to ovn episode of tachycardia.   -f/up cultures- +ve for staph, neg MRSA pcr   -ID following-initiating cefazolin   -removed PICC  -driveline infection vs pyelo     CHF (NYHA class IV, ACC/AHA stage D)    Updated 8/26/24:  EF 5-10% global hypokinesis, RV enlargement and global hypokinesis, biatrial enlargement, mild-to-moderate MR, severe TR, no van HeartMate 3 in place, aortic valve does not open, LVEDd 7.2  Previous TTE 9/5/23 with EF 10-15%, mod to sever TR, LVEDd 7.11, LVAD - HM3 in place    -anticipate potential discharge Friday   -continue on bumex 2mg BID   -GDMT: d/c spironolactone given possible gynecomastia, transition to epleronone   -Previously on home Primacor 0.25 at home but ran out approx 5 days prior to admission and also reports he occasionally disconnects himself from his pump to perform certain activities. Will plan to d/c off primacor   - TTE With LVEF 5-10%, poor RV function, dilation   -JVD improving with bumex 2mg   -palliative care c/s and pt discussed with service, appreciate f/up- plan for continued follow up with palliative outpatient as well with Dr. Carr       Pyelonephritis  Seen on ct with contrast. ID following.   -cont abx per ID   -no need for percutaneous drain per IR     Atrial flutter  pt previously went into atach vs aflutter. Pt was started  on amiodarone, with rhythm aborted to NSR following bolus. BP remained stable.   -BMP, replace lytes as needed   -cont amiodarone- will complete load and keep on maintenance dosing thereafter  -pt does report intermittently at home having episodes of palpitations that may also represent paroxysmal afib vs flutter at home. Already on a/c. Chadvasc of 5          Subcutaneous nodule of breast  Pt with unilateral R sided gynecomastia with hard nodular feeling beneath soft subcutaneous tissue of breast. No notable skin dimpling or rash.   -CT w/out contrast and US w/ likely gynecomastia, unable to r/out abscess, will get ct with contrast. New lymph node swelling seen on CT scan compared to old CTs.  Will need mammogram outpatient (discussed with pt).   -transition from spironolactone to epleronone     Supratherapeutic INR  -goal INR 2-3 , INR 4.7 on admission  - pharmacy  to manage coumadin dosing, cont warfarin   Check INR daily       Bilateral back pain  Pt reports initial pain following running out of primacor. Does have intermittent chest pain/pressure with straining on the toilet as well but no radiation of pain or issues with nausea/vomiting/diaphoresis/sob with symptoms. S/p spinal Xrays without significant abnormality.   NO red flag symptoms on admission  C/w home gabapentin, added tylenol prn and lidocaine patches   Back pain more pronounced on R side, most likely cause is his pyelonephritis       Polysubstance abuse  Pt has history of polysubstance use.   -UDS ordered given noncompliance      Hypokalemia  2.7 on admission  Replace and monitor      Type 2 diabetes mellitus with hyperglycemia, with long-term current use of insulin  A1C 10.3  Endocrinology consulted for glucose management, largely uncontrolled       Lab Results   Component Value Date    HGBA1C 10.3 (H) 08/25/2024    HGBA1C >14.0 (H) 04/26/2024    HGBA1C >14.0 (H) 02/17/2024    SMYTMLC5L 7.5 06/12/2023    VBMFUBY3S 8.5 02/07/2023           Moderate  malnutrition  -pt with aggressive care goals  -c/s nutrition, appreciate assistance     Seizure-like activity  C/w home keppra 1.5 gm BID        Alice Ayala MD  Heart Transplant  [facility] - Cardiology Stepdown

## 2024-08-30 NOTE — PLAN OF CARE
Plan of care discussed with patient. Patient ambulating with stand- by assist, fall precautions in place. No falls or injuries throughout the shift. LVAD DP and numbers WNL, smooth LVAD hum. No LFA.  Patient with intermittent complaints of muscle spasms, managed with PRN tylenol. Discussed medications and care. Pt on IV Cefazolin Q 8 hrs as per EMAR. Strict I&O's maintained. Patient has no questions at this time.     Problem: Adult Inpatient Plan of Care  Goal: Plan of Care Review  Outcome: Progressing  Goal: Patient-Specific Goal (Individualized)  Outcome: Progressing  Goal: Absence of Hospital-Acquired Illness or Injury  Outcome: Progressing  Goal: Optimal Comfort and Wellbeing  Outcome: Progressing  Goal: Readiness for Transition of Care  Outcome: Progressing     Problem: Diabetes Comorbidity  Goal: Blood Glucose Level Within Targeted Range  Outcome: Progressing     Problem: Infection  Goal: Absence of Infection Signs and Symptoms  Outcome: Progressing     Problem: Ventricular Assist Device  Goal: Optimal Adjustment to Device  Outcome: Progressing  Goal: Absence of Bleeding  Outcome: Progressing  Goal: Absence of Embolism Signs and Symptoms  Outcome: Progressing  Goal: Optimal Blood Flow  Outcome: Progressing  Goal: Absence of Infection Signs and Symptoms  Outcome: Progressing  Goal: Effective Right-Sided Heart Function  Outcome: Progressing     Problem: Ventricular Assist Device  Goal: Optimal Adjustment to Device  Outcome: Progressing  Goal: Absence of Bleeding  Outcome: Progressing  Goal: Absence of Embolism Signs and Symptoms  Outcome: Progressing  Goal: Optimal Blood Flow  Outcome: Progressing  Goal: Absence of Infection Signs and Symptoms  Outcome: Progressing  Goal: Effective Right-Sided Heart Function  Outcome: Progressing     Problem: Coping Ineffective  Goal: Effective Coping  Outcome: Progressing     Problem: Fall Injury Risk  Goal: Absence of Fall and Fall-Related Injury  Outcome: Progressing      Problem: Sepsis/Septic Shock  Goal: Optimal Coping  Outcome: Progressing  Goal: Absence of Bleeding  Outcome: Progressing  Goal: Blood Glucose Level Within Targeted Range  Outcome: Progressing  Goal: Absence of Infection Signs and Symptoms  Outcome: Progressing  Goal: Optimal Nutrition Intake  Outcome: Progressing

## 2024-08-30 NOTE — PROGRESS NOTES
Update:  SW met with pt to f/up and assess needs.  Pt was lying in bed, alert and oriented, calm and cooperative.    Pt reported that he has a good understanding of his POC at this time.  Pt expressed no needs and advised he is coping well at this time.  HTS SW remains available.

## 2024-08-30 NOTE — ASSESSMENT & PLAN NOTE
BG goal 140-180  Noncompliant T2DM.     Patient having significant variability in BG readings. Will monitor on current regimen and consider reducing scheduled Novolog dose at lunchtime if hypoglycemia noted again this evening. Spoke with patient about importance of adherence to ADA diet.     Plan:   - Continue Lantus (Insulin Glargine) 30 units BID   - Continue Novolog (Insulin Aspart) 20 units TIDWM.  - Continue moderate dose correction (150/25)  - BG checks AC/HS  - Hypoglycemia protocol in place    ** Please notify Endocrine for any change and/or advance in diet**  ** Please call Endocrine for any BG related issues **    Discharge Planning:   TBD. Please notify endocrinology prior to discharge.  Patient requires glucometer and testing supplies.  Recommend he resumes his William for blood sugar monitoring.

## 2024-08-31 PROBLEM — B49 FUNGEMIA: Status: ACTIVE | Noted: 2024-08-31

## 2024-08-31 PROBLEM — R91.8 PULMONARY NODULES: Status: ACTIVE | Noted: 2024-08-31

## 2024-08-31 LAB
ANION GAP SERPL CALC-SCNC: 14 MMOL/L (ref 8–16)
APTT PPP: 39 SEC (ref 21–32)
BACTERIA BLD CULT: ABNORMAL
BASOPHILS # BLD AUTO: 0.04 K/UL (ref 0–0.2)
BASOPHILS NFR BLD: 0.4 % (ref 0–1.9)
BUN SERPL-MCNC: 21 MG/DL (ref 6–20)
CALCIUM SERPL-MCNC: 9.3 MG/DL (ref 8.7–10.5)
CHLORIDE SERPL-SCNC: 94 MMOL/L (ref 95–110)
CO2 SERPL-SCNC: 26 MMOL/L (ref 23–29)
CREAT SERPL-MCNC: 1 MG/DL (ref 0.5–1.4)
DIFFERENTIAL METHOD BLD: ABNORMAL
EOSINOPHIL # BLD AUTO: 0 K/UL (ref 0–0.5)
EOSINOPHIL NFR BLD: 0.4 % (ref 0–8)
ERYTHROCYTE [DISTWIDTH] IN BLOOD BY AUTOMATED COUNT: 28.3 % (ref 11.5–14.5)
EST. GFR  (NO RACE VARIABLE): >60 ML/MIN/1.73 M^2
GLUCOSE SERPL-MCNC: 96 MG/DL (ref 70–110)
HCT VFR BLD AUTO: 27.3 % (ref 40–54)
HGB BLD-MCNC: 7.9 G/DL (ref 14–18)
IMM GRANULOCYTES # BLD AUTO: 0.04 K/UL (ref 0–0.04)
IMM GRANULOCYTES NFR BLD AUTO: 0.4 % (ref 0–0.5)
INR PPP: 2.5 (ref 0.8–1.2)
LDH SERPL L TO P-CCNC: 289 U/L (ref 110–260)
LYMPHOCYTES # BLD AUTO: 1.5 K/UL (ref 1–4.8)
LYMPHOCYTES NFR BLD: 13.9 % (ref 18–48)
MAGNESIUM SERPL-MCNC: 2 MG/DL (ref 1.6–2.6)
MCH RBC QN AUTO: 19.4 PG (ref 27–31)
MCHC RBC AUTO-ENTMCNC: 28.9 G/DL (ref 32–36)
MCV RBC AUTO: 67 FL (ref 82–98)
MONOCYTES # BLD AUTO: 0.9 K/UL (ref 0.3–1)
MONOCYTES NFR BLD: 8.6 % (ref 4–15)
NEUTROPHILS # BLD AUTO: 8.2 K/UL (ref 1.8–7.7)
NEUTROPHILS NFR BLD: 76.3 % (ref 38–73)
NRBC BLD-RTO: 0 /100 WBC
PHOSPHATE SERPL-MCNC: 4 MG/DL (ref 2.7–4.5)
PLATELET # BLD AUTO: 439 K/UL (ref 150–450)
PMV BLD AUTO: 10.1 FL (ref 9.2–12.9)
POCT GLUCOSE: 214 MG/DL (ref 70–110)
POCT GLUCOSE: 220 MG/DL (ref 70–110)
POCT GLUCOSE: 229 MG/DL (ref 70–110)
POCT GLUCOSE: 290 MG/DL (ref 70–110)
POCT GLUCOSE: 61 MG/DL (ref 70–110)
POCT GLUCOSE: 64 MG/DL (ref 70–110)
POCT GLUCOSE: 87 MG/DL (ref 70–110)
POTASSIUM SERPL-SCNC: 3.6 MMOL/L (ref 3.5–5.1)
PROTHROMBIN TIME: 26.2 SEC (ref 9–12.5)
RBC # BLD AUTO: 4.08 M/UL (ref 4.6–6.2)
SODIUM SERPL-SCNC: 134 MMOL/L (ref 136–145)
WBC # BLD AUTO: 10.71 K/UL (ref 3.9–12.7)

## 2024-08-31 PROCEDURE — 25000003 PHARM REV CODE 250

## 2024-08-31 PROCEDURE — 80048 BASIC METABOLIC PNL TOTAL CA: CPT | Performed by: STUDENT IN AN ORGANIZED HEALTH CARE EDUCATION/TRAINING PROGRAM

## 2024-08-31 PROCEDURE — 83735 ASSAY OF MAGNESIUM: CPT | Performed by: STUDENT IN AN ORGANIZED HEALTH CARE EDUCATION/TRAINING PROGRAM

## 2024-08-31 PROCEDURE — 84100 ASSAY OF PHOSPHORUS: CPT | Performed by: STUDENT IN AN ORGANIZED HEALTH CARE EDUCATION/TRAINING PROGRAM

## 2024-08-31 PROCEDURE — 99233 SBSQ HOSP IP/OBS HIGH 50: CPT | Mod: ,,, | Performed by: INTERNAL MEDICINE

## 2024-08-31 PROCEDURE — 99233 SBSQ HOSP IP/OBS HIGH 50: CPT | Mod: ,,, | Performed by: STUDENT IN AN ORGANIZED HEALTH CARE EDUCATION/TRAINING PROGRAM

## 2024-08-31 PROCEDURE — 25000003 PHARM REV CODE 250: Performed by: STUDENT IN AN ORGANIZED HEALTH CARE EDUCATION/TRAINING PROGRAM

## 2024-08-31 PROCEDURE — 25000003 PHARM REV CODE 250: Performed by: INTERNAL MEDICINE

## 2024-08-31 PROCEDURE — 99222 1ST HOSP IP/OBS MODERATE 55: CPT | Mod: ,,, | Performed by: STUDENT IN AN ORGANIZED HEALTH CARE EDUCATION/TRAINING PROGRAM

## 2024-08-31 PROCEDURE — 63600175 PHARM REV CODE 636 W HCPCS

## 2024-08-31 PROCEDURE — 20600001 HC STEP DOWN PRIVATE ROOM

## 2024-08-31 PROCEDURE — 27000248 HC VAD-ADDITIONAL DAY

## 2024-08-31 PROCEDURE — 99232 SBSQ HOSP IP/OBS MODERATE 35: CPT | Mod: ,,, | Performed by: NURSE PRACTITIONER

## 2024-08-31 PROCEDURE — 83615 LACTATE (LD) (LDH) ENZYME: CPT | Performed by: STUDENT IN AN ORGANIZED HEALTH CARE EDUCATION/TRAINING PROGRAM

## 2024-08-31 PROCEDURE — 25500020 PHARM REV CODE 255: Performed by: INTERNAL MEDICINE

## 2024-08-31 PROCEDURE — 36415 COLL VENOUS BLD VENIPUNCTURE: CPT | Performed by: STUDENT IN AN ORGANIZED HEALTH CARE EDUCATION/TRAINING PROGRAM

## 2024-08-31 PROCEDURE — 85730 THROMBOPLASTIN TIME PARTIAL: CPT | Performed by: STUDENT IN AN ORGANIZED HEALTH CARE EDUCATION/TRAINING PROGRAM

## 2024-08-31 PROCEDURE — 63600175 PHARM REV CODE 636 W HCPCS: Performed by: STUDENT IN AN ORGANIZED HEALTH CARE EDUCATION/TRAINING PROGRAM

## 2024-08-31 PROCEDURE — 93750 INTERROGATION VAD IN PERSON: CPT | Mod: ,,, | Performed by: INTERNAL MEDICINE

## 2024-08-31 PROCEDURE — 85610 PROTHROMBIN TIME: CPT | Performed by: STUDENT IN AN ORGANIZED HEALTH CARE EDUCATION/TRAINING PROGRAM

## 2024-08-31 PROCEDURE — 85025 COMPLETE CBC W/AUTO DIFF WBC: CPT | Performed by: STUDENT IN AN ORGANIZED HEALTH CARE EDUCATION/TRAINING PROGRAM

## 2024-08-31 RX ORDER — INSULIN ASPART 100 [IU]/ML
16 INJECTION, SOLUTION INTRAVENOUS; SUBCUTANEOUS
Status: DISCONTINUED | OUTPATIENT
Start: 2024-08-31 | End: 2024-09-01

## 2024-08-31 RX ORDER — POTASSIUM CHLORIDE 20 MEQ/1
40 TABLET, EXTENDED RELEASE ORAL ONCE
Status: COMPLETED | OUTPATIENT
Start: 2024-08-31 | End: 2024-08-31

## 2024-08-31 RX ORDER — OXYCODONE HCL 10 MG/1
10 TABLET, FILM COATED, EXTENDED RELEASE ORAL DAILY PRN
Status: DISCONTINUED | OUTPATIENT
Start: 2024-08-31 | End: 2024-09-10 | Stop reason: HOSPADM

## 2024-08-31 RX ORDER — INSULIN GLARGINE 100 [IU]/ML
24 INJECTION, SOLUTION SUBCUTANEOUS 2 TIMES DAILY
Status: DISCONTINUED | OUTPATIENT
Start: 2024-08-31 | End: 2024-09-01

## 2024-08-31 RX ORDER — INSULIN ASPART 100 [IU]/ML
16 INJECTION, SOLUTION INTRAVENOUS; SUBCUTANEOUS
Status: DISCONTINUED | OUTPATIENT
Start: 2024-08-31 | End: 2024-08-31

## 2024-08-31 RX ADMIN — GABAPENTIN 400 MG: 400 CAPSULE ORAL at 09:08

## 2024-08-31 RX ADMIN — PANTOPRAZOLE SODIUM 40 MG: 40 TABLET, DELAYED RELEASE ORAL at 09:08

## 2024-08-31 RX ADMIN — WARFARIN SODIUM 2 MG: 2 TABLET ORAL at 04:08

## 2024-08-31 RX ADMIN — INSULIN ASPART 16 UNITS: 100 INJECTION, SOLUTION INTRAVENOUS; SUBCUTANEOUS at 12:08

## 2024-08-31 RX ADMIN — ACETAMINOPHEN 650 MG: 325 TABLET ORAL at 03:08

## 2024-08-31 RX ADMIN — MICAFUNGIN SODIUM 100 MG: 100 INJECTION, POWDER, LYOPHILIZED, FOR SOLUTION INTRAVENOUS at 07:08

## 2024-08-31 RX ADMIN — AMIODARONE HYDROCHLORIDE 400 MG: 200 TABLET ORAL at 09:08

## 2024-08-31 RX ADMIN — ACETAMINOPHEN 650 MG: 325 TABLET ORAL at 04:08

## 2024-08-31 RX ADMIN — BUMETANIDE 2 MG: 1 TABLET ORAL at 04:08

## 2024-08-31 RX ADMIN — Medication 400 MG: at 09:08

## 2024-08-31 RX ADMIN — POTASSIUM CHLORIDE 40 MEQ: 1500 TABLET, EXTENDED RELEASE ORAL at 06:08

## 2024-08-31 RX ADMIN — LIDOCAINE 2 PATCH: 50 PATCH TOPICAL at 07:08

## 2024-08-31 RX ADMIN — CEFAZOLIN 2 G: 2 INJECTION, POWDER, FOR SOLUTION INTRAMUSCULAR; INTRAVENOUS at 04:08

## 2024-08-31 RX ADMIN — IOHEXOL 50 ML: 350 INJECTION, SOLUTION INTRAVENOUS at 04:08

## 2024-08-31 RX ADMIN — LEVETIRACETAM 1500 MG: 500 TABLET, FILM COATED ORAL at 09:08

## 2024-08-31 RX ADMIN — INSULIN ASPART 16 UNITS: 100 INJECTION, SOLUTION INTRAVENOUS; SUBCUTANEOUS at 04:08

## 2024-08-31 RX ADMIN — ASPIRIN 81 MG: 81 TABLET, COATED ORAL at 09:08

## 2024-08-31 RX ADMIN — AMIODARONE HYDROCHLORIDE 400 MG: 200 TABLET ORAL at 08:08

## 2024-08-31 RX ADMIN — GABAPENTIN 100 MG: 100 CAPSULE ORAL at 01:08

## 2024-08-31 RX ADMIN — DULOXETINE HYDROCHLORIDE 30 MG: 30 CAPSULE, DELAYED RELEASE ORAL at 09:08

## 2024-08-31 RX ADMIN — INSULIN ASPART 16 UNITS: 100 INJECTION, SOLUTION INTRAVENOUS; SUBCUTANEOUS at 09:08

## 2024-08-31 RX ADMIN — CEFAZOLIN 2 G: 2 INJECTION, POWDER, FOR SOLUTION INTRAMUSCULAR; INTRAVENOUS at 01:08

## 2024-08-31 RX ADMIN — AMLODIPINE BESYLATE 5 MG: 5 TABLET ORAL at 09:08

## 2024-08-31 RX ADMIN — BUMETANIDE 2 MG: 1 TABLET ORAL at 09:08

## 2024-08-31 RX ADMIN — INSULIN GLARGINE 24 UNITS: 100 INJECTION, SOLUTION SUBCUTANEOUS at 09:08

## 2024-08-31 RX ADMIN — OXYCODONE HYDROCHLORIDE 10 MG: 10 TABLET, FILM COATED, EXTENDED RELEASE ORAL at 04:08

## 2024-08-31 RX ADMIN — CEFAZOLIN 2 G: 2 INJECTION, POWDER, FOR SOLUTION INTRAMUSCULAR; INTRAVENOUS at 08:08

## 2024-08-31 RX ADMIN — LEVETIRACETAM 1500 MG: 500 TABLET, FILM COATED ORAL at 08:08

## 2024-08-31 RX ADMIN — ATORVASTATIN CALCIUM 40 MG: 20 TABLET, FILM COATED ORAL at 09:08

## 2024-08-31 RX ADMIN — GABAPENTIN 100 MG: 100 CAPSULE ORAL at 09:08

## 2024-08-31 RX ADMIN — EPLERENONE 25 MG: 25 TABLET, FILM COATED ORAL at 09:08

## 2024-08-31 NOTE — SUBJECTIVE & OBJECTIVE
"Interval HPI:   Overnight events: Remains in CSU. BG at and below goal ranges on current SQ insulin regimen. Hypoglycemia noted x2 yesterday on current insulin dosing. Diet diabetic Cardiac (Low Na/Chol); 2000 Calorie; Fluid - 1500mL; Standard Tray    Eatin%  Nausea: No  Hypoglycemia and intervention: Yes  Fever: No  TPN and/or TF: No  If yes, type of TF/TPN and rate: n/a    BP (!) 102/0   Pulse 80   Temp 98.9 °F (37.2 °C) (Oral)   Resp 18   Ht 6' 3" (1.905 m)   Wt 68.5 kg (151 lb 0.2 oz)   SpO2 96%   BMI 18.88 kg/m²     Labs Reviewed and Include    Recent Labs   Lab 24  0244   GLU 96   CALCIUM 9.3   *   K 3.6   CO2 26   CL 94*   BUN 21*   CREATININE 1.0     Lab Results   Component Value Date    WBC 10.71 2024    HGB 7.9 (L) 2024    HCT 27.3 (L) 2024    MCV 67 (L) 2024     2024     Recent Labs   Lab 24  1058   TSH 2.009     Lab Results   Component Value Date    HGBA1C 10.3 (H) 2024       Nutritional status:   Body mass index is 18.88 kg/m².  Lab Results   Component Value Date    ALBUMIN 2.5 (L) 2024    ALBUMIN 2.4 (L) 2024    ALBUMIN 2.2 (L) 2024     Lab Results   Component Value Date    PREALBUMIN 9 (L) 2024    PREALBUMIN 8 (L) 2024    PREALBUMIN 7 (L) 2024       Estimated Creatinine Clearance: 96.1 mL/min (based on SCr of 1 mg/dL).    Accu-Checks  Recent Labs     24  1140 24  1607 24  1953 24  0629 24  0740 24  1133 24  1633 24  2100 24  2200 24  0228   POCTGLUCOSE 146* 61* 256* 300* 259* 148* 109 65* 123* 61*       Current Medications and/or Treatments Impacting Glycemic Control  Immunotherapy:    Immunosuppressants       None          Steroids:   Hormones (From admission, onward)      None          Pressors:    Autonomic Drugs (From admission, onward)      None          Hyperglycemia/Diabetes Medications:   Antihyperglycemics (From admission, " onward)      Start     Stop Route Frequency Ordered    08/31/24 0900  insulin glargine U-100 (Lantus) pen 24 Units         -- SubQ 2 times daily 08/31/24 0827    08/31/24 0830  insulin aspart U-100 pen 16 Units         -- SubQ 3 times daily with meals 08/31/24 0826    08/25/24 1506  insulin aspart U-100 pen 0-10 Units         -- SubQ Before meals & nightly PRN 08/25/24 1407

## 2024-08-31 NOTE — ASSESSMENT & PLAN NOTE
Pt with unilateral R sided gynecomastia with hard nodular feeling beneath soft subcutaneous tissue of breast. No notable skin dimpling or rash.   CT with contrast reporting gyneocomastia without fluid collection/abscess. Pulmonary nodules seen on exam (needs f/up scan in 3-6 mos), enlarged lymph nodes noted on scan as well. D/w pt need for continued f/up outpatient for cancer screening   -transition from spironolactone to epleronone

## 2024-08-31 NOTE — ASSESSMENT & PLAN NOTE
Pt has history of polysubstance use.   -UDS negative  -prn oxycodone for pyelonephritis pain x1 per day max given hx substance use

## 2024-08-31 NOTE — SUBJECTIVE & OBJECTIVE
Interval History:   Pt doing well overnight, still denies any chest pain, shortness of breath, lightheadedness, dizziness, fevers or chills overnight. Cont to have back pain and notably has r sided cva tenderness. Pt taking tylenol for pain and s/p 1 dose norco. Will add on prn oxycodone for pain.      No overlying skin changes, no wbc count overnight. Previously curbsided IR, no need for drain placement at this time and risk outweighs benefit given INR/on therapeutic a/c for VAD. Even with normal INR, not a large target at this time for need for procedure. If pt becomes septic from renal source, will need INR reversal, repeat CT scan with contrast and to reach out to IR pending results to assess for drain.     MSSA bacteremia- on cefazolin. PICC removed on 8/29. Yeast growing in blood as well- initiated micafungin on 8/30, d/w ID. No symptoms per patient/no fevers/no WBC. Sources are from LVAD  driveline vs Previous PICC.      Responding well to 2 mg of Bumex for diuresis, no issue any allergic reaction.  Kidney function at baseline and euvolemic/ no JVD. No abdominal distension, abdomen soft. Continue bumex.     Repeat goals of care discussion with pt 8/31- pt reports he does want to remain full code and does want to continue aggressive care. Discussed pt need for compliance with outpatient meds and appointments. Pt understanding/agreeable. Understands he will not be a candidate for a heart transplant consideration in future given his noncompliance and that he will not be eventually d/c'd on milrinone for support.     Continuous Infusions: none       Scheduled Meds:   [START ON 9/9/2024] amiodarone  200 mg Oral Daily    amiodarone  400 mg Oral BID    amLODIPine  5 mg Oral Daily    aspirin  81 mg Oral Daily    atorvastatin  40 mg Oral Daily    bumetanide  2 mg Oral BID loop    ceFAZolin (Ancef) IV (PEDS and ADULTS)  2 g Intravenous Q8H    DULoxetine  30 mg Oral Daily    eplerenone  25 mg Oral Daily    gabapentin   100 mg Oral BID    gabapentin  400 mg Oral QHS    insulin aspart U-100  16 Units Subcutaneous TIDWM    insulin glargine U-100  24 Units Subcutaneous BID    levETIRAcetam  1,500 mg Oral BID    LIDOcaine  2 patch Transdermal Q24H    magnesium oxide  400 mg Oral Daily    micafungin  100 mg Intravenous Q24H    nicotine  1 patch Transdermal Daily    pantoprazole  40 mg Oral Daily    warfarin  1 mg Oral Every Mon, Wed, Fri    warfarin  2 mg Oral Every Tues, Thurs, Sat, Sun     PRN Meds:  Current Facility-Administered Medications:     acetaminophen, 650 mg, Oral, Q6H PRN    cyclobenzaprine, 5 mg, Oral, Daily PRN    dextrose 10%, 12.5 g, Intravenous, PRN    dextrose 10%, 25 g, Intravenous, PRN    glucagon (human recombinant), 1 mg, Intramuscular, PRN    glucose, 16 g, Oral, PRN    glucose, 24 g, Oral, PRN    insulin aspart U-100, 0-10 Units, Subcutaneous, QID (AC + HS) PRN    ondansetron, 4 mg, Oral, Q8H PRN    Review of patient's allergies indicates:   Allergen Reactions    Torsemide Hives     Objective:     Vital Signs (Most Recent):  Temp: 98.9 °F (37.2 °C) (08/31/24 0823)  Pulse: 85 (08/31/24 1036)  Resp: 18 (08/31/24 0823)  BP: (!) 78/0 (08/31/24 0823)  SpO2: 96 % (08/31/24 0823) Vital Signs (24h Range):  Temp:  [97.6 °F (36.4 °C)-98.9 °F (37.2 °C)] 98.9 °F (37.2 °C)  Pulse:  [77-91] 85  Resp:  [18-19] 18  SpO2:  [96 %-100 %] 96 %  BP: ()/(0-81) 78/0     No data found.    Body mass index is 18.88 kg/m².      Intake/Output Summary (Last 24 hours) at 8/31/2024 1138  Last data filed at 8/31/2024 0531  Gross per 24 hour   Intake 1640 ml   Output 2220 ml   Net -580 ml       Hemodynamic Parameters:       EKG- without ischemic changes this AM        Physical Exam  Constitutional:       General: He is not in acute distress.     Appearance: He is normal weight.   HENT:      Head: Normocephalic and atraumatic.      Right Ear: External ear normal.      Left Ear: External ear normal.      Nose: Nose normal.       Mouth/Throat:      Pharynx: Oropharynx is clear.   Cardiovascular:      Comments: Normal VAD hum, JVD present but improving   Pulmonary:      Effort: Pulmonary effort is normal.   Abdominal:      General: Abdomen is flat. Bowel sounds are normal. There is no distension (Distended but soft.).      Palpations: Abdomen is soft. There is no mass.      Tenderness: There is no abdominal tenderness. There is right CVA tenderness.   Musculoskeletal:      Cervical back: Normal range of motion.      Right lower leg: No edema.      Left lower leg: No edema.      Comments: Cont b/l back pain- upper spine and b/l paraspinal/upper back muscular pain.    Skin:     General: Skin is warm.      Comments: Chest wall on the right side with gynecomastia, no overlying skin changes, no tenderness at the site however palpable mass versus nodule present.   Neurological:      Mental Status: He is alert. Mental status is at baseline.            Significant Labs:  CBC:  Recent Labs   Lab 08/29/24  0346 08/30/24  0308 08/31/24  0244   WBC 10.09 9.74 10.71   RBC 4.01* 4.05* 4.08*   HGB 7.8* 8.0* 7.9*   HCT 27.2* 26.7* 27.3*    427 439   MCV 68* 66* 67*   MCH 19.5* 19.8* 19.4*   MCHC 28.7* 30.0* 28.9*     BNP:  Recent Labs   Lab 08/26/24  0508 08/28/24  0345 08/30/24  0308   * 376* 462*     CMP:  Recent Labs   Lab 08/26/24  0508 08/26/24  2221 08/28/24  0345 08/29/24  0346 08/30/24  0308 08/31/24  0244   *   < > 219* 170* 287* 96   CALCIUM 8.8   < > 9.0 9.2 9.1 9.3   ALBUMIN 2.2*  --  2.4*  --  2.5*  --    PROT 7.4  --  7.6  --  8.5*  --    *   < > 131* 129* 130* 134*   K 4.0   < > 3.6 3.6 4.1 3.6   CO2 29   < > 26 26 25 26   CL 94*   < > 92* 93* 95 94*   BUN 13   < > 19 20 21* 21*   CREATININE 1.0   < > 1.1 1.1 1.3 1.0   ALKPHOS 199*  --  215*  --  221*  --    ALT 13  --  13  --  11  --    AST 23  --  24  --  31  --    BILITOT 2.2*  --  2.4*  --  2.0*  --     < > = values in this interval not displayed.       Coagulation:   Recent Labs   Lab 08/29/24  0346 08/30/24  0308 08/31/24  0244   INR 2.5* 2.9* 2.5*   APTT 39.8* 40.1* 39.0*     LDH:  Recent Labs   Lab 08/29/24  0346 08/30/24  0308 08/31/24  0244   * 255 289*     Microbiology:  Microbiology Results (last 7 days)       Procedure Component Value Units Date/Time    Blood culture [[id number]]  (Abnormal)  (Susceptibility) Collected: 08/27/24 1802    Order Status: Completed Specimen: Blood Updated: 08/31/24 1049     Blood Culture, Routine Gram stain aer bottle: Gram positive cocci in clusters resembling Staph      Results called to and read back by:Miguelina Thomas RN 08/28/2024  22:59      MICROCOCCUS SPECIES  Organism is a probable contaminant        STAPHYLOCOCCUS AUREUS  ID consult required at [facility] and [facility] locations.      Blood culture [[id number]]  (Abnormal) Collected: 08/27/24 1751    Order Status: Completed Specimen: Blood Updated: 08/31/24 1041     Blood Culture, Routine Gram stain dudley bottle: Gram positive cocci in clusters resembling Staph      Results called to and read back by: Lorri Riggs RN  08/29/2024   13:50      Gram stain aer bottle: yeast      Results called to and read back by:Lorri Riggs RN 08/30/2024      STAPHYLOCOCCUS AUREUS  ID consult required at [facility] and [facility].  For susceptibility see order #[id number]      Narrative:      Blood culture # 2 different location.    Blood culture [[id number]] Collected: 08/28/24 2226    Order Status: Completed Specimen: Blood Updated: 08/31/24 0955     Blood Culture, Routine Gram stain aer bottle: yeast      Gram stain dudley bottle: yeast      Positive results previously called 08/30/2024    Narrative:      Code 42746  Draw sample # 2 from separate site    Blood culture [[id number]] Collected: 08/28/24 2226    Order Status: Completed Specimen: Blood Updated: 08/31/24 0950     Blood Culture, Routine Gram stain aer bottle: yeast      Results called to and read  back by:Lorri Riggs RN 08/30/2024  17:10    Narrative:      Code 51439  Draw sample # 2 from separate site    Blood culture [[id number]] Collected: 08/30/24 1152    Order Status: Completed Specimen: Blood Updated: 08/30/24 1945     Blood Culture, Routine No Growth to date    Narrative:      Lft AC    Blood culture [[id number]] Collected: 08/30/24 1152    Order Status: Completed Specimen: Blood Updated: 08/30/24 1945     Blood Culture, Routine No Growth to date    Narrative:      Rt AC    Rapid Organism ID by PCR (from Blood culture) [[id number]] Collected: 08/28/24 2226    Order Status: Completed Updated: 08/30/24 1823     Enterococcus faecalis Not Detected     Enterococcus faecium Not Detected     Listeria monocytogenes Not Detected     Staphylococcus spp. Not Detected     Staphylococcus aureus Not Detected     Staphylococcus epidermidis Not Detected     Staphylococcus lugdunensis Not Detected     Streptococcus species Not Detected     Streptococcus agalactiae Not Detected     Streptococcus pneumoniae Not Detected     Streptococcus pyogenes Not Detected     Acinetobacter calcoaceticus/baumannii complex Not Detected     Bacteroides fragilis Not Detected     Enterobacterales Not Detected     Enterobacter cloacae complex Not Detected     Escherichia coli Not Detected     Klebsiella aerogenes Not Detected     Klebsiella oxytoca Not Detected     Klebsiella pneumoniae group Not Detected     Proteus Not Detected     Salmonella sp Not Detected     Serratia marcescens Not Detected     Haemophilus influenzae Not Detected     Neisseria meningtidis Not Detected     Pseudomonas aeruginosa Not Detected     Stenotrophomonas maltophilia Not Detected     Candida albicans Not Detected     Candida auris Not Detected     Candida glabrata Not Detected     Candida krusei Not Detected     Candida parapsilosis Not Detected     Candida tropicalis Not Detected     Cryptococcus neoformans/gattii Not Detected     CTX-M (ESBL ) Test  Not Applicable     IMP (Carbapenem resistant) Test Not Applicable     KPC resistance gene (Carbapenem resistant) Test Not Applicable     mcr-1  Test Not Applicable     mec A/C  Test Not Applicable     mec A/C and MREJ (MRSA) gene Test Not Applicable     NDM (Carbapenem resistant) Test Not Applicable     OXA-48-like (Carbapenem resistant) Test Not Applicable     van A/B (VRE gene) Test Not Applicable     VIM (Carbapenem resistant) Test Not Applicable    Narrative:      Code 25746  Draw sample # 2 from separate site    Culture, Anaerobe [[id number]] Collected: 08/25/24 1733    Order Status: Completed Specimen: Wound from Abdomen Updated: 08/30/24 1405     Anaerobic Culture No anaerobes isolated    Narrative:      Drive line exit site    Aerobic culture [[id number]]  (Abnormal)  (Susceptibility) Collected: 08/27/24 1446    Order Status: Completed Specimen: Skin from Abdomen Updated: 08/29/24 1424     Aerobic Bacterial Culture STAPHYLOCOCCUS AUREUS  Rare      Narrative:      Driveline site    Culture, Anaerobe [[id number]] Collected: 08/27/24 1446    Order Status: Completed Specimen: Skin from Abdomen Updated: 08/29/24 0946     Anaerobic Culture Culture in progress    Narrative:      Driveline site    MRSA/SA Rapid ID by PCR from Blood culture [[id number]]  (Abnormal) Collected: 08/27/24 1802    Order Status: Completed Updated: 08/29/24 0027     Staph aureus ID by PCR Positive     Methicillin Resistant ID by PCR Negative    Blood culture [[id number]]     Order Status: Canceled Specimen: Blood     Blood culture [[id number]]     Order Status: Canceled Specimen: Blood     Aerobic culture [[id number]] Collected: 08/25/24 1733    Order Status: Sent Specimen: Wound from Abdomen Updated: 08/25/24 1734            I have reviewed all pertinent labs within the past 24 hours.    Estimated Creatinine Clearance: 96.1 mL/min (based on SCr of 1 mg/dL).    Diagnostic Results:  I have reviewed and interpreted all pertinent imaging  results/findings within the past 24 hours.

## 2024-08-31 NOTE — ASSESSMENT & PLAN NOTE
Pt reports initial pain following running out of primacor. Does have intermittent chest pain/pressure with straining on the toilet as well but no radiation of pain or issues with nausea/vomiting/diaphoresis/sob with symptoms. S/p spinal Xrays without significant abnormality besides degenerative disc disease.  NO red flag symptoms on admission  C/w home gabapentin, added tylenol prn and lidocaine patches   Back pain more pronounced on R side, most likely cause is his pyelonephritis   -prn oxycodone added for only one dose at most daily due to hx with drug use/pt appears comfortable in bed with tylenol

## 2024-08-31 NOTE — ASSESSMENT & PLAN NOTE
BG goal 140-180  Noncompliant T2DM.     Given hypoglycemia noted x 2- recommend reducing insulin dosing by 20% today.     Plan:   - Decrease Lantus (Insulin Glargine) to 24 units BID   - Decrease Novolog (Insulin Aspart) to 16  units TIDWM.  - Continue moderate dose correction (150/25)  - BG checks AC/HS  - Hypoglycemia protocol in place    ** Please notify Endocrine for any change and/or advance in diet**  ** Please call Endocrine for any BG related issues **    Discharge Planning:   TBD. Please notify endocrinology prior to discharge.  Patient requires glucometer and testing supplies.  Recommend he resumes his [place] for blood sugar monitoring.

## 2024-08-31 NOTE — PROGRESS NOTES
[facility] - Cardiology Stepdown  Heart Transplant  Progress Note    Patient Name: Kevan Queen  MRN: [id number]  Admission Date: 8/25/2024  Hospital Length of Stay: 6 days  Attending Physician: Dalia Crum MD  Primary Care Provider: Rosio Armendariz FNP  Principal Problem:LVAD (left ventricular assist device) present    Subjective:     Interval History:   Pt doing well overnight, still denies any chest pain, shortness of breath, lightheadedness, dizziness, fevers or chills overnight. Cont to have back pain and notably has r sided cva tenderness. Pt taking tylenol for pain and s/p 1 dose norco. Will add on prn oxycodone for pain.      No overlying skin changes, no wbc count overnight. Previously curbsided IR, no need for drain placement at this time and risk outweighs benefit given INR/on therapeutic a/c for VAD. Even with normal INR, not a large target at this time for need for procedure. If pt becomes septic from renal source, will need INR reversal, repeat CT scan with contrast and to reach out to IR pending results to assess for drain.     MSSA bacteremia- on cefazolin. PICC removed on 8/29. Yeast growing in blood as well- initiated micafungin on 8/30, d/w ID. No symptoms per patient/no fevers/no WBC. Sources are from LVAD  driveline vs Previous PICC.      Responding well to 2 mg of Bumex for diuresis, no issue any allergic reaction.  Kidney function at baseline and euvolemic/ no JVD. No abdominal distension, abdomen soft. Continue bumex.     Repeat goals of care discussion with pt 8/31- pt reports he does want to remain full code and does want to continue aggressive care. Discussed pt need for compliance with outpatient meds and appointments. Pt understanding/agreeable. Understands he will not be a candidate for a heart transplant consideration in future given his noncompliance and that he will not be eventually d/c'd on milrinone for support.     Continuous Infusions: none       Scheduled Meds:    [START ON 9/9/2024] amiodarone  200 mg Oral Daily    amiodarone  400 mg Oral BID    amLODIPine  5 mg Oral Daily    aspirin  81 mg Oral Daily    atorvastatin  40 mg Oral Daily    bumetanide  2 mg Oral BID loop    ceFAZolin (Ancef) IV (PEDS and ADULTS)  2 g Intravenous Q8H    DULoxetine  30 mg Oral Daily    eplerenone  25 mg Oral Daily    gabapentin  100 mg Oral BID    gabapentin  400 mg Oral QHS    insulin aspart U-100  16 Units Subcutaneous TIDWM    insulin glargine U-100  24 Units Subcutaneous BID    levETIRAcetam  1,500 mg Oral BID    LIDOcaine  2 patch Transdermal Q24H    magnesium oxide  400 mg Oral Daily    micafungin  100 mg Intravenous Q24H    nicotine  1 patch Transdermal Daily    pantoprazole  40 mg Oral Daily    warfarin  1 mg Oral Every Mon, Wed, Fri    warfarin  2 mg Oral Every Tues, Thurs, Sat, Sun     PRN Meds:  Current Facility-Administered Medications:     acetaminophen, 650 mg, Oral, Q6H PRN    cyclobenzaprine, 5 mg, Oral, Daily PRN    dextrose 10%, 12.5 g, Intravenous, PRN    dextrose 10%, 25 g, Intravenous, PRN    glucagon (human recombinant), 1 mg, Intramuscular, PRN    glucose, 16 g, Oral, PRN    glucose, 24 g, Oral, PRN    insulin aspart U-100, 0-10 Units, Subcutaneous, QID (AC + HS) PRN    ondansetron, 4 mg, Oral, Q8H PRN    Review of patient's allergies indicates:   Allergen Reactions    Torsemide Hives     Objective:     Vital Signs (Most Recent):  Temp: 98.9 °F (37.2 °C) (08/31/24 0823)  Pulse: 85 (08/31/24 1036)  Resp: 18 (08/31/24 0823)  BP: (!) 78/0 (08/31/24 0823)  SpO2: 96 % (08/31/24 0823) Vital Signs (24h Range):  Temp:  [97.6 °F (36.4 °C)-98.9 °F (37.2 °C)] 98.9 °F (37.2 °C)  Pulse:  [77-91] 85  Resp:  [18-19] 18  SpO2:  [96 %-100 %] 96 %  BP: ()/(0-81) 78/0     No data found.    Body mass index is 18.88 kg/m².      Intake/Output Summary (Last 24 hours) at 8/31/2024 1138  Last data filed at 8/31/2024 0531  Gross per 24 hour   Intake 1640 ml   Output 2220 ml   Net -580 ml        Hemodynamic Parameters:       EKG- without ischemic changes this AM        Physical Exam  Constitutional:       General: He is not in acute distress.     Appearance: He is normal weight.   HENT:      Head: Normocephalic and atraumatic.      Right Ear: External ear normal.      Left Ear: External ear normal.      Nose: Nose normal.      Mouth/Throat:      Pharynx: Oropharynx is clear.   Cardiovascular:      Comments: Normal VAD hum, JVD present but improving   Pulmonary:      Effort: Pulmonary effort is normal.   Abdominal:      General: Abdomen is flat. Bowel sounds are normal. There is no distension (Distended but soft.).      Palpations: Abdomen is soft. There is no mass.      Tenderness: There is no abdominal tenderness. There is right CVA tenderness.   Musculoskeletal:      Cervical back: Normal range of motion.      Right lower leg: No edema.      Left lower leg: No edema.      Comments: Cont b/l back pain- upper spine and b/l paraspinal/upper back muscular pain.    Skin:     General: Skin is warm.      Comments: Chest wall on the right side with gynecomastia, no overlying skin changes, no tenderness at the site however palpable mass versus nodule present.   Neurological:      Mental Status: He is alert. Mental status is at baseline.            Significant Labs:  CBC:  Recent Labs   Lab 08/29/24  0346 08/30/24  0308 08/31/24  0244   WBC 10.09 9.74 10.71   RBC 4.01* 4.05* 4.08*   HGB 7.8* 8.0* 7.9*   HCT 27.2* 26.7* 27.3*    427 439   MCV 68* 66* 67*   MCH 19.5* 19.8* 19.4*   MCHC 28.7* 30.0* 28.9*     BNP:  Recent Labs   Lab 08/26/24  0508 08/28/24  0345 08/30/24  0308   * 376* 462*     CMP:  Recent Labs   Lab 08/26/24  0508 08/26/24  2221 08/28/24  0345 08/29/24  0346 08/30/24  0308 08/31/24  0244   *   < > 219* 170* 287* 96   CALCIUM 8.8   < > 9.0 9.2 9.1 9.3   ALBUMIN 2.2*  --  2.4*  --  2.5*  --    PROT 7.4  --  7.6  --  8.5*  --    *   < > 131* 129* 130* 134*   K 4.0   < >  3.6 3.6 4.1 3.6   CO2 29   < > 26 26 25 26   CL 94*   < > 92* 93* 95 94*   BUN 13   < > 19 20 21* 21*   CREATININE 1.0   < > 1.1 1.1 1.3 1.0   ALKPHOS 199*  --  215*  --  221*  --    ALT 13  --  13  --  11  --    AST 23  --  24  --  31  --    BILITOT 2.2*  --  2.4*  --  2.0*  --     < > = values in this interval not displayed.      Coagulation:   Recent Labs   Lab 08/29/24  0346 08/30/24  0308 08/31/24  0244   INR 2.5* 2.9* 2.5*   APTT 39.8* 40.1* 39.0*     LDH:  Recent Labs   Lab 08/29/24  0346 08/30/24  0308 08/31/24  0244   * 255 289*     Microbiology:  Microbiology Results (last 7 days)       Procedure Component Value Units Date/Time    Blood culture [[id number]]  (Abnormal)  (Susceptibility) Collected: 08/27/24 1802    Order Status: Completed Specimen: Blood Updated: 08/31/24 1049     Blood Culture, Routine Gram stain aer bottle: Gram positive cocci in clusters resembling Staph      Results called to and read back by:Miguelina [facility] RN 08/28/2024  22:59      MICROCOCCUS SPECIES  Organism is a probable contaminant        STAPHYLOCOCCUS AUREUS  ID consult required at [facility] and [facility] locations.      Blood culture [[id number]]  (Abnormal) Collected: 08/27/24 1751    Order Status: Completed Specimen: Blood Updated: 08/31/24 1041     Blood Culture, Routine Gram stain dudley bottle: Gram positive cocci in clusters resembling Staph      Results called to and read back by: Lorri Riggs RN  08/29/2024   13:50      Gram stain aer bottle: yeast      Results called to and read back by:Lorri Riggs RN 08/30/2024      STAPHYLOCOCCUS AUREUS  ID consult required at [facility].  For susceptibility see order #[id number]      Narrative:      Blood culture # 2 different location.    Blood culture [[id number]] Collected: 08/28/24 2226    Order Status: Completed Specimen: Blood Updated: 08/31/24 0955     Blood Culture, Routine Gram stain aer bottle: yeast      Gram stain dudley bottle:  yeast      Positive results previously called 08/30/2024    Narrative:      Code 27345  Draw sample # 2 from separate site    Blood culture [[id number]] Collected: 08/28/24 2226    Order Status: Completed Specimen: Blood Updated: 08/31/24 0950     Blood Culture, Routine Gram stain aer bottle: yeast      Results called to and read back by:Lorri Riggs RN 08/30/2024  17:10    Narrative:      Code 71068  Draw sample # 2 from separate site    Blood culture [[id number]] Collected: 08/30/24 1152    Order Status: Completed Specimen: Blood Updated: 08/30/24 1945     Blood Culture, Routine No Growth to date    Narrative:      Lft AC    Blood culture [[id number]] Collected: 08/30/24 1152    Order Status: Completed Specimen: Blood Updated: 08/30/24 1945     Blood Culture, Routine No Growth to date    Narrative:      Rt AC    Rapid Organism ID by PCR (from Blood culture) [[id number]] Collected: 08/28/24 2226    Order Status: Completed Updated: 08/30/24 1823     Enterococcus faecalis Not Detected     Enterococcus faecium Not Detected     Listeria monocytogenes Not Detected     Staphylococcus spp. Not Detected     Staphylococcus aureus Not Detected     Staphylococcus epidermidis Not Detected     Staphylococcus lugdunensis Not Detected     Streptococcus species Not Detected     Streptococcus agalactiae Not Detected     Streptococcus pneumoniae Not Detected     Streptococcus pyogenes Not Detected     Acinetobacter calcoaceticus/baumannii complex Not Detected     Bacteroides fragilis Not Detected     Enterobacterales Not Detected     Enterobacter cloacae complex Not Detected     Escherichia coli Not Detected     Klebsiella aerogenes Not Detected     Klebsiella oxytoca Not Detected     Klebsiella pneumoniae group Not Detected     Proteus Not Detected     Salmonella sp Not Detected     Serratia marcescens Not Detected     Haemophilus influenzae Not Detected     Neisseria meningtidis Not Detected     Pseudomonas aeruginosa Not Detected      Stenotrophomonas maltophilia Not Detected     Candida albicans Not Detected     Candida auris Not Detected     Candida glabrata Not Detected     Candida krusei Not Detected     Candida parapsilosis Not Detected     Candida tropicalis Not Detected     Cryptococcus neoformans/gattii Not Detected     CTX-M (ESBL ) Test Not Applicable     IMP (Carbapenem resistant) Test Not Applicable     KPC resistance gene (Carbapenem resistant) Test Not Applicable     mcr-1  Test Not Applicable     mec A/C  Test Not Applicable     mec A/C and MREJ (MRSA) gene Test Not Applicable     NDM (Carbapenem resistant) Test Not Applicable     OXA-48-like (Carbapenem resistant) Test Not Applicable     van A/B (VRE gene) Test Not Applicable     VIM (Carbapenem resistant) Test Not Applicable    Narrative:      Code 56056  Draw sample # 2 from separate site    Culture, Anaerobe [[id number]] Collected: 08/25/24 1733    Order Status: Completed Specimen: Wound from Abdomen Updated: 08/30/24 1405     Anaerobic Culture No anaerobes isolated    Narrative:      Drive line exit site    Aerobic culture [[id number]]  (Abnormal)  (Susceptibility) Collected: 08/27/24 1446    Order Status: Completed Specimen: Skin from Abdomen Updated: 08/29/24 1424     Aerobic Bacterial Culture STAPHYLOCOCCUS AUREUS  Rare      Narrative:      Driveline site    Culture, Anaerobe [[id number]] Collected: 08/27/24 1446    Order Status: Completed Specimen: Skin from Abdomen Updated: 08/29/24 0946     Anaerobic Culture Culture in progress    Narrative:      Driveline site    MRSA/SA Rapid ID by PCR from Blood culture [[id number]]  (Abnormal) Collected: 08/27/24 1802    Order Status: Completed Updated: 08/29/24 0027     Staph aureus ID by PCR Positive     Methicillin Resistant ID by PCR Negative    Blood culture [[id number]]     Order Status: Canceled Specimen: Blood     Blood culture [[id number]]     Order Status: Canceled Specimen: Blood     Aerobic culture  [[id number]] Collected: 08/25/24 1733    Order Status: Sent Specimen: Wound from Abdomen Updated: 08/25/24 1734            I have reviewed all pertinent labs within the past 24 hours.    Estimated Creatinine Clearance: 96.1 mL/min (based on SCr of 1 mg/dL).    Diagnostic Results:  I have reviewed and interpreted all pertinent imaging results/findings within the past 24 hours.  Assessment and Plan:     Mr. Kevan Thacker is a 39 year old male with stage D 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 He underwent RVAD removal and chest closure 6/30/2022.  He was weaned off  but he had to restarted due to RVF. He was eventually transitioned to milrinone (secondary to  shortage) now supposed to be on 0.25 mcg/kg/min. He has a history of unclear syncope vs seizures and is followed by neuro for this and is on Keppra.  [facility].      On 11/15/23 underwent removal of eroded [employer] scICD--no Lifevest (due to LVAD) and no plan to replace ICD as not requiring therapy post LVAD with concern for reinfection.  He has not had neurology f/u with seizure hx on keppra so coordinator to assist him with obtaining appt.      Reports back pain (primarily upper back pain) for the past 3-4 days. He reports that it started 2 days after stopping pirmacor and he feels it may be related. He has also been sleeping in the couch and identifies that it may also be related to the back pain. He denies lifting anything heavy or any falls /trauma. No red flags including incontinence of stool or urine. No numbness in the groin area or weakness in the legs or upper extremity reported. He reports he was not able to go for appointment  because of transport issue that has now been resolved. He denies orthopnea, PND, weight gain, leg swelling. He says that he does have some shortness of breath after walking long distances which has been ongoing for months now and has not changed lately. He has lost some weight over last few months since his but weight has been stable over the last month. He denied headache, constipation, diarrhea, SOB, cough, palpitations or any additional symptoms.     * LVAD (left ventricular assist device) present  -HeartMate 3 Implanted  6/29/2022  as DT  -HTS Primary  -cont coumadin, Goal INR 2.0-3.0.   Lab Results   Component Value Date    INR 2.5 (H) 08/31/2024    INR 2.9 (H) 08/30/2024    INR 2.5 (H) 08/29/2024       -Antiplatelets ASA 81 mg  -LDH is stable overall today. Will continue to monitor daily.  -Speed set at 5100, LSL 4700 rpm  -Interrogation notable for  power cable disconnected, low voltage advisory, PI events  -Not listed for OHTx- advised pt he would not be candidate for OHTx (noncompliance/poor f/up)   -UDS negative  -attempted to call partner Robyn, unfortunately goes to        Procedure: Device Interrogation Including analysis of device parameters  Current Settings: Ventricular Assist Device  Review of device function is stable/unstable stable        8/31/2024     9:19 AM 8/31/2024     4:51 AM 8/30/2024    11:30 PM 8/30/2024     9:00 PM 8/30/2024     4:41 PM 8/30/2024    12:50 PM 8/30/2024     8:26 AM   TXP LVAD INTERROGATIONS   Type HeartMate3 HeartMate3 HeartMate3 HeartMate3 HeartMate3 HeartMate3 HeartMate3   Flow 4.1 3.5 3.9 4.2 4.1 4.3 4   Speed 5100 5100 5100 5100 5100 5100 5100   PI 5.6 7.3 6.2 5.3 5.4 4.7 5.9   Power (Serna) 3.6 3.6 3.6 3.6 3.6 3.6 3.5   LSL 4700    4700 4700 4700   Pulsatility  Pulse Intermittent pulse No Pulse Intermittent pulse Intermittent pulse Intermittent pulse         Severe sepsis  -CT chest/abdomen and pelvis w/ contrast w/ pyelonephritis and renal abscess   -WBC  downtrending/normalized, likely a reaction to ovn episode of tachycardia.   -f/up cultures- +ve for staph, neg MRSA pcr, yeast now growing in blood as well  -ID following-initiating cefazolin, added micafungin  -c/s to Optho for eval of endopthalmitis in setting of yeast in blood   -removed PICC on 8/28  -driveline infection vs pyelo     CHF (NYHA class IV, ACC/AHA stage D)    Updated 8/26/24:  EF 5-10% global hypokinesis, RV enlargement and global hypokinesis, biatrial enlargement, mild-to-moderate MR, severe TR, no van HeartMate 3 in place, aortic valve does not open, LVEDd 7.2  Previous TTE 9/5/23 with EF 10-15%, mod to sever TR, LVEDd 7.11, LVAD - HM3 in place    -continue on bumex 2mg BID, cont inpatient treatment for bacteremia and fungemia   -GDMT: d/c spironolactone given possible gynecomastia, transition to epleronone   -Previously on home Primacor 0.25 at home but ran out approx 5 days prior to admission and also reports he occasionally disconnects himself from his pump to perform certain activities. Will plan to d/c off primacor   - TTE With LVEF 5-10%, poor RV function, dilation, no signs of endocarditis on TTE   -palliative care c/s and pt discussed with service, appreciate f/up- plan for continued follow up with palliative outpatient as well with Dr. Carr   -repeat [facility] discussion with pt on 8/31- full code, aggressive care, pt need for compliance going forward re-iterated. Pt understanding, apologetic for previous noncompliance. Pt understands he is not a candidate for heart transplant and will not be evaluated due to his noncompliance.       Pyelonephritis  Seen on ct with contrast. ID following.   -cont abx per ID   -no need for percutaneous drain per IR at this time unless pt decompensates  -current bacteremia/yeast not from renal source     Atrial flutter  pt previously went into atach vs aflutter. Pt was started on amiodarone, with rhythm aborted to NSR following bolus. BP remained stable.   -BMP,  replace lytes as needed   -cont amiodarone- will complete load and keep on maintenance dosing thereafter  -pt does report intermittently at home having episodes of palpitations that may also represent paroxysmal afib vs flutter at home. Already on a/c. Chadvasc of 5          Subcutaneous nodule of breast  Pt with unilateral R sided gynecomastia with hard nodular feeling beneath soft subcutaneous tissue of breast. No notable skin dimpling or rash.   CT with contrast reporting gyneocomastia without fluid collection/abscess. Pulmonary nodules seen on exam (needs f/up scan in 3-6 mos), enlarged lymph nodes noted on scan as well. D/w pt need for continued f/up outpatient for cancer screening   -transition from spironolactone to epleronone     Supratherapeutic INR  -goal INR 2-3 , INR 4.7 on admission  - pharmacy  to manage coumadin dosing, cont warfarin   Check INR daily       Bilateral back pain  Pt reports initial pain following running out of primacor. Does have intermittent chest pain/pressure with straining on the toilet as well but no radiation of pain or issues with nausea/vomiting/diaphoresis/sob with symptoms. S/p spinal Xrays without significant abnormality besides degenerative disc disease.  NO red flag symptoms on admission  C/w home gabapentin, added tylenol prn and lidocaine patches   Back pain more pronounced on R side, most likely cause is his pyelonephritis   -prn oxycodone added for only one dose at most daily due to hx with drug use/pt appears comfortable in bed with tylenol       Pulmonary nodules  Multiple pulmonary nodules seen on CT scan with contrast. Pt does have hx tobacco use. Lymph node enlargement noted as well. Pt has weight loss but not drastic  -need f/up scan in 3-6 months to assess stability of nodules     Polysubstance abuse  Pt has history of polysubstance use.   -UDS negative  -prn oxycodone for pyelonephritis pain x1 per day max given hx substance use       Hypokalemia  2.7 on  admission  Replace and monitor      Type 2 diabetes mellitus with hyperglycemia, with long-term current use of insulin  A1C 10.3  Endocrinology consulted for glucose management, largely uncontrolled       Lab Results   Component Value Date    HGBA1C 10.3 (H) 08/25/2024    HGBA1C >14.0 (H) 04/26/2024    HGBA1C >14.0 (H) 02/17/2024    [id number] 7.5 06/12/2023    [id number] 8.5 02/07/2023           Moderate malnutrition  -pt with aggressive care goals  -c/s nutrition, appreciate assistance     Seizure-like activity  C/w home keppra 1.5 gm BID        Alice Ayala MD  Heart Transplant  [facility] - Cardiology Stepdown

## 2024-08-31 NOTE — ASSESSMENT & PLAN NOTE
-CT chest/abdomen and pelvis w/ contrast w/ pyelonephritis and renal abscess   -WBC downtrending/normalized, likely a reaction to ovn episode of tachycardia.   -f/up cultures- +ve for staph, neg MRSA pcr, yeast now growing in blood as well  -ID following-initiating cefazolin, added micafungin  -c/s to Optho for eval of endopthalmitis in setting of yeast in blood   -removed PICC on 8/28  -driveline infection vs pyelo

## 2024-08-31 NOTE — ASSESSMENT & PLAN NOTE
Updated 8/26/24:  EF 5-10% global hypokinesis, RV enlargement and global hypokinesis, biatrial enlargement, mild-to-moderate MR, severe TR, no van HeartMate 3 in place, aortic valve does not open, LVEDd 7.2  Previous TTE 9/5/23 with EF 10-15%, mod to sever TR, LVEDd 7.11, LVAD - HM3 in place    -continue on bumex 2mg BID, cont inpatient treatment for bacteremia and fungemia   -GDMT: d/c spironolactone given possible gynecomastia, transition to epleronone   -Previously on home Primacor 0.25 at home but ran out approx 5 days prior to admission and also reports he occasionally disconnects himself from his pump to perform certain activities. Will plan to d/c off primacor   - TTE With LVEF 5-10%, poor RV function, dilation, no signs of endocarditis on TTE   -palliative care c/s and pt discussed with service, appreciate f/up- plan for continued follow up with palliative outpatient as well with Dr. Carr   -repeat [facility] discussion with pt on 8/31- full code, aggressive care, pt need for compliance going forward re-iterated. Pt understanding, apologetic for previous noncompliance. Pt understands he is not a candidate for heart transplant and will not be evaluated due to his noncompliance.

## 2024-08-31 NOTE — ASSESSMENT & PLAN NOTE
Seen on ct with contrast. ID following.   -cont abx per ID   -no need for percutaneous drain per IR at this time unless pt decompensates  -current bacteremia/yeast not from renal source

## 2024-08-31 NOTE — ASSESSMENT & PLAN NOTE
Multiple pulmonary nodules seen on CT scan with contrast. Pt does have hx tobacco use. Lymph node enlargement noted as well. Pt has weight loss but not drastic  -need f/up scan in 3-6 months to assess stability of nodules

## 2024-08-31 NOTE — ASSESSMENT & PLAN NOTE
-HeartMate 3 Implanted  6/29/2022  as DT  -HTS Primary  -cont coumadin, Goal INR 2.0-3.0.   Lab Results   Component Value Date    INR 2.5 (H) 08/31/2024    INR 2.9 (H) 08/30/2024    INR 2.5 (H) 08/29/2024       -Antiplatelets ASA 81 mg  -LDH is stable overall today. Will continue to monitor daily.  -Speed set at 5100, LSL 4700 rpm  -Interrogation notable for  power cable disconnected, low voltage advisory, PI events  -Not listed for OHTx- advised pt he would not be candidate for OHTx (noncompliance/poor f/up)   -UDS negative  -attempted to call partner Robyn, unfortunately goes to        Procedure: Device Interrogation Including analysis of device parameters  Current Settings: Ventricular Assist Device  Review of device function is stable/unstable stable        8/31/2024     9:19 AM 8/31/2024     4:51 AM 8/30/2024    11:30 PM 8/30/2024     9:00 PM 8/30/2024     4:41 PM 8/30/2024    12:50 PM 8/30/2024     8:26 AM   TXP LVAD INTERROGATIONS   Type HeartMate3 HeartMate3 HeartMate3 HeartMate3 HeartMate3 HeartMate3 HeartMate3   Flow 4.1 3.5 3.9 4.2 4.1 4.3 4   Speed 5100 5100 5100 5100 5100 5100 5100   PI 5.6 7.3 6.2 5.3 5.4 4.7 5.9   Power (Serna) 3.6 3.6 3.6 3.6 3.6 3.6 3.5   LSL 4700    4700 4700 4700   Pulsatility  Pulse Intermittent pulse No Pulse Intermittent pulse Intermittent pulse Intermittent pulse

## 2024-08-31 NOTE — PLAN OF CARE
"VSS on RA, LVAD # WNL. At shift change, pt c/o severe lower back pain "20/10", MAP/DP 80s, one time norco given per order with pain relief. Later pt c/o 5/10 headache, throbbing, denies vision change, no neuro deficits noted. Pt refusing CTH if ordered. HTS notified & instructed to give PRN tylenol & monitor. Tylenol given with pain relief  BG checked, food given for low BG. I&O monitored    Problem: Adult Inpatient Plan of Care  Goal: Absence of Hospital-Acquired Illness or Injury  Outcome: Progressing     Problem: Diabetes Comorbidity  Goal: Blood Glucose Level Within Targeted Range  Outcome: Progressing     Problem: Infection  Goal: Absence of Infection Signs and Symptoms  Outcome: Progressing     Problem: Ventricular Assist Device  Goal: Absence of Bleeding  Outcome: Progressing     "

## 2024-08-31 NOTE — PROGRESS NOTES
[facility] - Cardiology Stepdown  Infectious Disease  Progress Note    Patient Name: Kevan Queen  MRN: [id number]  Admission Date: 8/25/2024  Length of Stay: 6 days  Attending Physician: Dalia Crum MD  Primary Care Provider: Rosio Armendariz FNP    Isolation Status: No active isolations  Assessment/Plan:      Cardiac/Vascular  * LVAD (left ventricular assist device) present  MSSA DLESI  Bacteremia  Pyelnephritis/renal abscess  Fungemia    I independently reviewed patient's lab work and images as documented. 38 yo male with LVAD HM3 as DT, prior MSSA DLESI/bacteremia c/b empyema (on suppressive cefadroxil) and eroded ICD/pocket infection s/p removal, seizure admitted for back pain. Hospital course notable for blcx with micrococcus (suspect contaminant), MSSA, and fungemia. DLES also with MSSA. Repeat blood cx no growth to date as of 8/30. CT A/P with contrast with irregular nonenhancing renal lesion c/f pyelonephritis or potential abscess, R chest lesion c/f gynecomastia. Old PICC pulled 8/29. TTE without IE. Pending ophthalmology evaluation.     Recommendations:  -cefazolin 2g q8hr IV   -micafungin 100 mg iv daily  -follow up cx  -opthal evaluation  -if worsening back/flank pain, consider repeat CT with contrast to evaluate for progression of pyelo/abscess or spinal etiology              Thank you for your consult. I will follow-up with patient. Please contact us if you have any additional questions. Above d/w primary team.       Laura Baldwin MD  Infectious Disease  [facility] - Cardiology Stepdown      50 minutes of total time spent on the encounter, which includes face to face time and non-face to face time preparing to see the patient (eg, review of tests), obtaining and/or reviewing separately obtained history, documenting clinical information in the electronic or other health record, independently interpreting results (not separately reported) and communicating results to the  patient/family/caregiver, or care coordination (not separately reported).       Subjective:     Principal Problem:LVAD (left ventricular assist device) present    HPI: 38 yo male with LVAD HM3 as DT, prior MSSA DLESI/bacteremia c/b empyema (on suppressive cefadroxil) and eroded ICD/pocket infection s/p removal, seizure admitted for back pain. ID consulted for abx recs. Hospital course notable for US of chest with hypoechoic area of unclear significance. DLES cx in process. Pt reported adherence to cefadroxil - denied issues with it. Denied fevers, chills, abdominal complaints or worsening drainage from DLES. Pt reported that he ran out of his primacor and had some back pain soon after. Denied problems with PICC. Reported swelling under R breast - pt states he is unclear how long he's had the swelling.            Interval History: No fevers documented overnight.   Blcx from 8/28 now with yeast. Picc removed 8/29.     Review of Systems   Constitutional:  Negative for chills and fever.   Eyes:  Negative for visual disturbance.   Genitourinary:  Positive for flank pain. Negative for difficulty urinating and dysuria.   Musculoskeletal:  Positive for back pain.   All other systems reviewed and are negative.    Objective:     Vital Signs (Most Recent):  Temp: 98.9 °F (37.2 °C) (08/31/24 0823)  Pulse: 85 (08/31/24 1036)  Resp: 18 (08/31/24 0823)  BP: (!) 78/0 (08/31/24 0823)  SpO2: 96 % (08/31/24 0823) Vital Signs (24h Range):  Temp:  [97.6 °F (36.4 °C)-98.9 °F (37.2 °C)] 98.9 °F (37.2 °C)  Pulse:  [77-91] 85  Resp:  [18-19] 18  SpO2:  [96 %-100 %] 96 %  BP: ()/(0-81) 78/0     Weight: 68.5 kg (151 lb 0.2 oz)  Body mass index is 18.88 kg/m².    Estimated Creatinine Clearance: 96.1 mL/min (based on SCr of 1 mg/dL).     Physical Exam  Constitutional:       General: He is not in acute distress.     Appearance: He is not ill-appearing or toxic-appearing.   Eyes:      General:         Right eye: No discharge.         Left  eye: No discharge.   Pulmonary:      Effort: Pulmonary effort is normal. No respiratory distress.   Abdominal:      General: There is no distension.      Palpations: Abdomen is soft.      Tenderness: There is right CVA tenderness and left CVA tenderness.      Comments: LVAD dressed   Musculoskeletal:         General: Tenderness (lower back pain) present.   Skin:     General: Skin is warm and dry.   Neurological:      Mental Status: He is alert and oriented to person, place, and time.          Significant Labs:   Microbiology Results (last 7 days)       Procedure Component Value Units Date/Time    Blood culture [[id number]]  (Abnormal)  (Susceptibility) Collected: 08/27/24 1802    Order Status: Completed Specimen: Blood Updated: 08/31/24 1049     Blood Culture, Routine Gram stain aer bottle: Gram positive cocci in clusters resembling Staph      Results called to and read back by:Miguelina Thomas RN 08/28/2024  22:59      MICROCOCCUS SPECIES  Organism is a probable contaminant        STAPHYLOCOCCUS AUREUS  ID consult required at [facility] and [facility] locations.      Blood culture [[id number]]  (Abnormal) Collected: 08/27/24 1751    Order Status: Completed Specimen: Blood Updated: 08/31/24 1041     Blood Culture, Routine Gram stain dudley bottle: Gram positive cocci in clusters resembling Staph      Results called to and read back by: Lorri Riggs RN  08/29/2024   13:50      Gram stain aer bottle: yeast      Results called to and read back by:Lorri Riggs RN 08/30/2024      STAPHYLOCOCCUS AUREUS  ID consult required at [facility] and [facility] locations.  For susceptibility see order #[id number]      Narrative:      Blood culture # 2 different location.    Blood culture [[id number]] Collected: 08/28/24 2226    Order Status: Completed Specimen: Blood Updated: 08/31/24 0955     Blood Culture, Routine Gram stain aer bottle: yeast      Gram stain dudley bottle: yeast      Positive results previously called  08/30/2024    Narrative:      Code 53610  Draw sample # 2 from separate site    Blood culture [[id number]] Collected: 08/28/24 2226    Order Status: Completed Specimen: Blood Updated: 08/31/24 0950     Blood Culture, Routine Gram stain aer bottle: yeast      Results called to and read back by:Lorri Riggs RN 08/30/2024  17:10    Narrative:      Code 99525  Draw sample # 2 from separate site    Blood culture [[id number]] Collected: 08/30/24 1152    Order Status: Completed Specimen: Blood Updated: 08/30/24 1945     Blood Culture, Routine No Growth to date    Narrative:      Lft AC    Blood culture [[id number]] Collected: 08/30/24 1152    Order Status: Completed Specimen: Blood Updated: 08/30/24 1945     Blood Culture, Routine No Growth to date    Narrative:      Rt AC    Rapid Organism ID by PCR (from Blood culture) [[id number]] Collected: 08/28/24 2226    Order Status: Completed Updated: 08/30/24 1823     Enterococcus faecalis Not Detected     Enterococcus faecium Not Detected     Listeria monocytogenes Not Detected     Staphylococcus spp. Not Detected     Staphylococcus aureus Not Detected     Staphylococcus epidermidis Not Detected     Staphylococcus lugdunensis Not Detected     Streptococcus species Not Detected     Streptococcus agalactiae Not Detected     Streptococcus pneumoniae Not Detected     Streptococcus pyogenes Not Detected     Acinetobacter calcoaceticus/baumannii complex Not Detected     Bacteroides fragilis Not Detected     Enterobacterales Not Detected     Enterobacter cloacae complex Not Detected     Escherichia coli Not Detected     Klebsiella aerogenes Not Detected     Klebsiella oxytoca Not Detected     Klebsiella pneumoniae group Not Detected     Proteus Not Detected     Salmonella sp Not Detected     Serratia marcescens Not Detected     Haemophilus influenzae Not Detected     Neisseria meningtidis Not Detected     Pseudomonas aeruginosa Not Detected     Stenotrophomonas maltophilia Not Detected      Candida albicans Not Detected     Candida auris Not Detected     Candida glabrata Not Detected     Candida krusei Not Detected     Candida parapsilosis Not Detected     Candida tropicalis Not Detected     Cryptococcus neoformans/gattii Not Detected     CTX-M (ESBL ) Test Not Applicable     IMP (Carbapenem resistant) Test Not Applicable     KPC resistance gene (Carbapenem resistant) Test Not Applicable     mcr-1  Test Not Applicable     mec A/C  Test Not Applicable     mec A/C and MREJ (MRSA) gene Test Not Applicable     NDM (Carbapenem resistant) Test Not Applicable     OXA-48-like (Carbapenem resistant) Test Not Applicable     van A/B (VRE gene) Test Not Applicable     VIM (Carbapenem resistant) Test Not Applicable    Narrative:      Code 02366  Draw sample # 2 from separate site    Culture, Anaerobe [[id number]] Collected: 08/25/24 1733    Order Status: Completed Specimen: Wound from Abdomen Updated: 08/30/24 1405     Anaerobic Culture No anaerobes isolated    Narrative:      Drive line exit site    Aerobic culture [[id number]]  (Abnormal)  (Susceptibility) Collected: 08/27/24 1446    Order Status: Completed Specimen: Skin from Abdomen Updated: 08/29/24 1424     Aerobic Bacterial Culture STAPHYLOCOCCUS AUREUS  Rare      Narrative:      Driveline site    Culture, Anaerobe [[id number]] Collected: 08/27/24 1446    Order Status: Completed Specimen: Skin from Abdomen Updated: 08/29/24 0946     Anaerobic Culture Culture in progress    Narrative:      Driveline site    MRSA/SA Rapid ID by PCR from Blood culture [[id number]]  (Abnormal) Collected: 08/27/24 1802    Order Status: Completed Updated: 08/29/24 0027     Staph aureus ID by PCR Positive     Methicillin Resistant ID by PCR Negative    Blood culture [[id number]]     Order Status: Canceled Specimen: Blood     Blood culture [[id number]]     Order Status: Canceled Specimen: Blood     Aerobic culture [[id number]] Collected: 08/25/24 1733    Order Status:  Sent Specimen: Wound from Abdomen Updated: 08/25/24 8241            Significant Imaging: I have reviewed all pertinent imaging results/findings within the past 24 hours.

## 2024-08-31 NOTE — ASSESSMENT & PLAN NOTE
MSSA DLESI  Bacteremia  Pyelnephritis/renal abscess  Fungemia    I independently reviewed patient's lab work and images as documented. 40 yo male with LVAD HM3 as DT, prior MSSA DLESI/bacteremia c/b empyema (on suppressive cefadroxil) and eroded ICD/pocket infection s/p removal, seizure admitted for back pain. Hospital course notable for blcx with micrococcus (suspect contaminant), MSSA, and fungemia. DLES also with MSSA. Repeat blood cx no growth to date as of 8/28. CT with contrast now with irregular nonenhancing renal lesion c/f pyelonephritis or potential abscess, R chest lesion c/f gynecomastia. Old PICC pulled 8/29. TTE without IE. Pending ophthalmology evaluation.     Recommendations:  -cefazolin 2g q8hr IV   -micafungin 100 mg iv daily  -follow up cx  -opthal evaluation  -if worsening back/flank pain, consider repeat CT with contrast to evaluate for progression of pyelo/abscess

## 2024-08-31 NOTE — ASSESSMENT & PLAN NOTE
-pt with aggressive care goals  -c/s nutrition, appreciate assistance    [facility]  Obstetrics  Postpartum Progress Note    Patient Name: Hyacinth Card  MRN: [id number]  Admission Date: 2017  Hospital Length of Stay: 2 days  Attending Physician: Anita Valdes MD  Primary Care Provider: Yari Banegas MD    Subjective:     Principal Problem:S/P  section    Interval History:  Hyacinth Card is a 33 y.o. female POD#2 status post Repeat  section on 17 07:15 AM  at 39w1d. Patient is doing well today. She denies nausea, vomiting, fever or chills.  Patient reports mild abdominal pain that is well relieved by IV and oral pain medications. Vaginal bleeding is light. Patient is voiding without difficulty and ambulating with no difficulty. She has passed flatus, and has not had BM.     Patient is breastfeeding. She desires circumcision for her male baby: yes.     Objective:     Vital Signs (Most Recent):  Temp: 97.7 °F (36.5 °C) (17 0000)  Pulse: 80 (17 0000)  Resp: 16 (17 0000)  BP: 114/71 (17 0000)  SpO2: 95 % (17 0000) Vital Signs (24h Range):  Temp:  [97.7 °F (36.5 °C)-98.3 °F (36.8 °C)] 97.7 °F (36.5 °C)  Pulse:  [79-80] 80  Resp:  [16-18] 16  SpO2:  [95 %-98 %] 95 %  BP: (114-124)/(70-71) 114/71     Weight: 92.8 kg (204 lb 9.4 oz)  Body mass index is 33.02 kg/m².      Intake/Output Summary (Last 24 hours) at 17 0809  Last data filed at 17 1700   Gross per 24 hour   Intake             1000 ml   Output              950 ml   Net               50 ml       Significant Labs:  Lab Results   Component Value Date    GROUPTRH A NEG 2017    HEPBSAG Negative 10/18/2016    STREPBCULT STREPTOCOCCUS AGALACTIAE (GROUP B) 2017    AFP 1.23 MoM (44.3 ng/mL) 2010       Recent Labs  Lab 17  0600   HGB 11.7*   HCT 34.7*       I have personallly reviewed all pertinent lab results from the last 24 hours.    Physical Exam:   Constitutional: She is oriented to person, place,  and time. She appears well-developed and well-nourished. No distress.       Cardiovascular: Normal rate and regular rhythm.     Pulmonary/Chest: Effort normal.        Abdominal: Soft. Bowel sounds are normal. She exhibits abdominal incision (bandage c/d/i). She exhibits no distension. There is no tenderness. There is no rebound and no guarding.             Musculoskeletal: She exhibits no edema or tenderness.       Neurological: She is alert and oriented to person, place, and time.    Skin: Skin is warm and dry.    Psychiatric: She has a normal mood and affect.       Assessment/Plan:     33 y.o. female  for:    Rh negative, maternal    - infant Rh positive  - [facility] screen neg  - s/p Rhogam 17        * S/P  section and BTL on     - Patient doing well. Continue routine management and advances.  - Continue PO pain meds. Pain well controlled.  - Heme: H/h , postop   - Encourage ambulation  - Circumcision: consents signed, order placed  - Contraception per primary OB  - Lactation consult prn              Disposition: As patient meets milestones, will plan to discharge POD#4.    Kayleigh Rose MD  Obstetrics  [facility]

## 2024-08-31 NOTE — PROGRESS NOTES
[facility] - Cardiology Stepdown  Endocrinology  Progress Note    Admit Date: 8/25/2024     Reason for Consult: Management of T2DM, Hyperglycemia     Surgical Procedure and Date: LVAD 06/29/2022     Diabetes diagnosis year: 2022    Home Diabetes Medications:  Levemir 20 units twice daily and Novolog SSI (150/25) per patient    How often checking glucose at home?  Has not checked in a few weeks due to running out of strips    BG readings on regimen: COLLIN  Hypoglycemia on the regimen?  Yes; rarely experiences hypoglycemic symptoms such as blurry vision and vomiting. However, does not know his blood sugar level due to running out of supplies. He will self-correct with a snack.  Missed doses on regimen?  Yes, has not had insulin in a few weeks due to running out of glucometer supplies.    Diabetes Complications include:   Hyperglycemia    Complicating diabetes co morbidities:   History of MI, CHF, and CKD      HPI:   Patient is a 39 y.o. 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 He underwent RVAD removal and chest closure 6/30/2022.  He was weaned off  but restarted due to RVF. He was transitioned to milrinone (secondary to  shortage). History of unclear syncope vs seizures and followed by neuro and is on Keppra. On 11/15/23 underwent removal of eroded [employer] scICD--no Lifevest (due to LVAD) and no plan to replace ICD as not requiring therapy post LVAD with concern for reinfection. Reported back pain (primarily upper back pain) for the past 3-4 days. He reported that it started 2 days after stopping pirmacor and he felt it may be related. Reported some shortness of breath after walking long distances which has been ongoing for months now and has not changed lately. He has lost some weight over last few months since his  "but weight has been stable over the last month. Patient admitted after being out of [facility] for 1 week with ongoing back pain. Patient stated he has not taken his insulin in a few weeks due to running out of glucometer testing supplies. He previously had a William, but is not wearing one. BG >[facility] at [facility]. Endo consulted for BG management.      Interval HPI:   Overnight events: Remains in CSU. BG at and below goal ranges on current SQ insulin regimen. Hypoglycemia noted x2 yesterday on current insulin dosing. Diet diabetic Cardiac (Low Na/Chol); 2000 Calorie; Fluid - 1500mL; Standard Tray    Eatin%  Nausea: No  Hypoglycemia and intervention: Yes  Fever: No  TPN and/or TF: No  If yes, type of TF/TPN and rate: n/a    BP (!) 102/0   Pulse 80   Temp 98.9 °F (37.2 °C) (Oral)   Resp 18   Ht 6' 3" (1.905 m)   Wt 68.5 kg (151 lb 0.2 oz)   SpO2 96%   BMI 18.88 kg/m²     Labs Reviewed and Include    Recent Labs   Lab 24  0244   GLU 96   CALCIUM 9.3   *   K 3.6   CO2 26   CL 94*   BUN 21*   CREATININE 1.0     Lab Results   Component Value Date    WBC 10.71 2024    HGB 7.9 (L) 2024    HCT 27.3 (L) 2024    MCV 67 (L) 2024     2024     Recent Labs   Lab 24  1058   TSH 2.009     Lab Results   Component Value Date    HGBA1C 10.3 (H) 2024       Nutritional status:   Body mass index is 18.88 kg/m².  Lab Results   Component Value Date    ALBUMIN 2.5 (L) 2024    ALBUMIN 2.4 (L) 2024    ALBUMIN 2.2 (L) 2024     Lab Results   Component Value Date    PREALBUMIN 9 (L) 2024    PREALBUMIN 8 (L) 2024    PREALBUMIN 7 (L) 2024       Estimated Creatinine Clearance: 96.1 mL/min (based on SCr of 1 mg/dL).    Accu-Checks  Recent Labs     24  1140 24  1607 24  1953 24  0629 24  0740 24  1133 24  1633 24  2100 24  2200 24  0228   POCTGLUCOSE 146* 61* 256* 300* 259* 148* " 109 65* 123* 61*       Current Medications and/or Treatments Impacting Glycemic Control  Immunotherapy:    Immunosuppressants       None          Steroids:   Hormones (From admission, onward)      None          Pressors:    Autonomic Drugs (From admission, onward)      None          Hyperglycemia/Diabetes Medications:   Antihyperglycemics (From admission, onward)      Start     Stop Route Frequency Ordered    08/31/24 0900  insulin glargine U-100 (Lantus) pen 24 Units         -- SubQ 2 times daily 08/31/24 0827    08/31/24 0830  insulin aspart U-100 pen 16 Units         -- SubQ 3 times daily with meals 08/31/24 0826    08/25/24 1506  insulin aspart U-100 pen 0-10 Units         -- SubQ Before meals & nightly PRN 08/25/24 1407            ASSESSMENT and PLAN    Cardiac/Vascular  * LVAD (left ventricular assist device) present  Managed by primary team  Optimize blood glucose control        CHF (NYHA class IV, ACC/AHA stage D)  Managed by primary team  Optimize blood glucose control      Endocrine  Type 2 diabetes mellitus with hyperglycemia, with long-term current use of insulin  BG goal 140-180  Noncompliant T2DM.     Given hypoglycemia noted x 2- recommend reducing insulin dosing by 20% today.     Plan:   - Decrease Lantus (Insulin Glargine) to 24 units BID   - Decrease Novolog (Insulin Aspart) to 16  units TIDWM.  - Continue moderate dose correction (150/25)  - BG checks AC/HS  - Hypoglycemia protocol in place    ** Please notify Endocrine for any change and/or advance in diet**  ** Please call Endocrine for any BG related issues **    Discharge Planning:   TBD. Please notify endocrinology prior to discharge.  Patient requires glucometer and testing supplies.  Recommend he resumes his William for blood sugar monitoring.          Jody Starkey, NP  Endocrinology  [facility] - Cardiology Stepdown

## 2024-08-31 NOTE — ASSESSMENT & PLAN NOTE
A1C 10.3  Endocrinology consulted for glucose management, largely uncontrolled       Lab Results   Component Value Date    HGBA1C 10.3 (H) 08/25/2024    HGBA1C >14.0 (H) 04/26/2024    HGBA1C >14.0 (H) 02/17/2024    [id number] 7.5 06/12/2023    [id number] 8.5 02/07/2023

## 2024-08-31 NOTE — CONSULTS
"Consultation Report  Ophthalmology Service    Date: 08/31/2024    Reason for Consult: "yeast bacteremia"     History of Present Illness: Kevan Queen is a 39 y.o. male with no past ocular hx who [place] [facility] [place] and found to have pyelonephritis with renal abscess. BCx positive yeast. Pt currently receiving IV antifungals. Ophthalmology is being consulted to evaluate fungemia, rule out fungal endophthalmitis. Pt endorses some blurry vision OU, both near and distance, but states this is not new and his visual acuity has not changed recently. Also endorses floaters OU, but states these are chronic and have not increased or changed in character. Patient denies any changes in vision, flashes, or curtain-veil in visual field ou. Denies any ocular discomfort ou.     POcularHx: Denies history of ocular problems or past ocular surgeries.    Current eye gtts: Denies     Family Hx: Denies family history of glaucoma, macular degeneration, or blindness. family history includes Diverticulosis in his brother; Heart attack in his maternal grandfather and maternal grandmother; Heart failure in his father.     PMHx:  has a past medical history of Acute respiratory failure with hypoxia (07/22/2023), Arthritis, Awareness alteration, transient (09/01/2022), Cardiomyopathy, CHF (congestive heart failure) (10/01/2020), COVID-19 (06/03/2023), Diabetes mellitus, Dilated cardiomyopathy (10/26/2020), Drug abuse (10/2020), Headache (04/19/2023), Hyperglycemia (12/16/2022), Hyperosmolar hyperglycemic state (HHS) (05/25/2022), ICD (implantable cardioverter-defibrillator) in place (10/26/2020), Infected defibrillator now s/p removal Nov 2023 (07/23/2023), Left ventricular assist device (LVAD) complication, initial encounter (06/05/2023), Muscle cramping (06/15/2022), Renal disorder, SOB (shortness of breath) (06/13/2022), and Tingling in extremities (07/13/2022).     PSurgHx:  has a past surgical history that includes Cardiac " defibrillator placement; Right heart catheterization (Right, 4/8/2022); Right heart catheterization (Right, 4/19/2022); Left ventricular assist device (Left, 6/23/2022); Left ventricular assist device (N/A, 6/29/2022); Implantation of right ventricular assist device (RVAD) (N/A, 6/29/2022); Sternal wound closure (N/A, 6/30/2022); Insertion of graft to pericardium (Right, 6/30/2022); Application of wound vacuum-assisted closure device (N/A, 6/30/2022); Irrigation of mediastinum (6/30/2022); Right heart catheterization (Right, 7/21/2022); Right heart catheterization (Right, 10/31/2022); extraction, electrode lead (Left, 11/9/2023); Revision of skin pocket for cardioverter-defibrillator (11/9/2023); and echocardiogram,transesophageal (11/9/2023).     Home Medications:   Prior to Admission medications    Medication Sig Start Date End Date Taking? Authorizing Provider   milrinone 20mg/100ml D5W, 200mcg/ml, (PRIMACOR) 20 mg/100 mL (200 mcg/mL) infusion Inject 23.6 mcg/min into the vein continuous. 9/11/23  Yes Marlo Marques MD   warfarin (COUMADIN) 3 MG tablet Take 1 tablet (3 mg total) by mouth Daily. Starting 5/27/2024. 5/27/24 5/27/25 Yes Chantal Herndon MD   acetaminophen (TYLENOL) 325 MG tablet Take 2 tablets (650 mg total) by mouth every 6 (six) hours as needed for Pain. 9/11/23   Marlo Marques MD   amLODIPine (NORVASC) 5 MG tablet Take 1 tablet (5 mg total) by mouth once daily. 4/27/24 4/27/25  Natalya Villatoro MD   aspirin (ECOTRIN) 81 MG EC tablet Take 1 tablet (81 mg total) by mouth once daily. 8/9/23   Josh Ryan MD   atorvastatin (LIPITOR) 40 MG tablet Take 1 tablet (40 mg total) by mouth once daily. 9/12/23 9/11/24  Marlo Marques MD   blood sugar diagnostic Strp Use to test blood glucose 4 (four) times daily. 1/9/24   Eron Lees MD   blood-glucose meter (TRUE METRIX GLUCOSE METER) Misc Use to test blood glucose 4 (four) times daily. 11/15/23      blood-glucose sensor (FREESTYLE  LUCIUS 3 SENSOR) Dee 1 Device by Misc.(Non-Drug; Combo Route) route every 14 (fourteen) days. 4/26/24 4/26/25  Susanna Doty PA-C   cefadroxil (DURICEF) 500 MG Cap Take 1 capsule (500 mg total) by mouth every 12 (twelve) hours. 11/29/23 11/28/24  Laura Baldwin MD   cyclobenzaprine (FLEXERIL) 5 MG tablet Take 1 tablet (5 mg total) by mouth daily as needed for Muscle spasms. To be taken prior to daily lasix dose. 5/26/24 9/23/24  Chantal Herndon MD   DULoxetine (CYMBALTA) 30 MG capsule Take 1 capsule (30 mg total) by mouth once daily. 5/29/24 5/29/25  Jane Ash MD   furosemide (LASIX) 80 MG tablet Take 1 tablet (80 mg total) by mouth once daily. 10/25/23 10/24/24  Natalya Villatoro MD   gabapentin (NEURONTIN) 100 MG capsule Take 1 capsule (100 mg total) by mouth 2 (two) times daily AND 4 capsules (400 mg total) every evening. 5/29/24 5/29/25  Jane Ash MD   glucagon (BAQSIMI) 3 mg/actuation Spry 1 each by Nasal route as needed (hypoglycemia). 4/26/24   Susanna Doty PA-C   insulin aspart U-100 (NOVOLOG) 100 unit/mL (3 mL) InPn pen Inject 18 Units into the skin 3 (three) times daily with meals: MAX 94 units/daily  150-200    201-250    251-300    301-350    >350  +2 units   +4 units    +6 units    +8 units    +10 units 4/26/24   Susanna Doty PA-C   insulin detemir U-100, Levemir, 100 unit/mL (3 mL) SubQ InPn pen Inject 20 Units into the skin 2 (two) times daily. In the morning and before bed. 4/26/24 7/25/24  Susanna Doty PA-C   lancets 33 gauge Misc Use to test blood glucose 4 (four) times daily. 11/15/23   Dalia Crum MD   levETIRAcetam (KEPPRA) 1000 MG tablet Take 1.5 tablets (1,500 mg total) by mouth 2 (two) times daily. 12/22/23   Rodger Yadav IV, MD   magnesium oxide (MAG-OX) 400 mg (241.3 mg magnesium) tablet Take 1 tablet (400 mg total) by mouth once daily. 10/25/23   Natalya Villatoro MD   mirtazapine (REMERON) 15 MG tablet Take 1 tablet (15 mg total) by  "mouth nightly. 10/25/23   Natalya Villatoro MD   ondansetron (ZOFRAN-ODT) 4 MG TbDL Take 1 tablet (4 mg total) by mouth every 8 (eight) hours as needed (nausea). 2/4/24   Prasad Banda MD   pantoprazole (PROTONIX) 40 MG tablet Take 1 tablet (40 mg total) by mouth once daily. 9/12/23 9/11/24  Marlo Marques MD   pen needle, diabetic 32 gauge x 5/32" Ndle 1 each by Misc.(Non-Drug; Combo Route) route 4 (four) times daily. 4/26/24   Susanna Doty PA-C   potassium chloride SA (K-DUR,KLOR-CON M) 10 MEQ tablet Take 3 tablets (30 mEq total) by mouth once daily. 4/26/24   Natalya Villatoro MD   spironolactone (ALDACTONE) 25 MG tablet Take 1 tablet (25 mg total) by mouth once daily. 10/9/23 10/8/24  Josh Ryan MD   digoxin (LANOXIN) 125 mcg tablet Take 1 tablet (0.125 mg total) by mouth once daily. 4/26/22 5/27/22  Jeff Spencer PA-C   EScitalopram oxalate (LEXAPRO) 5 MG Tab Take 1 tablet (5 mg total) by mouth once daily. 5/27/22 7/27/22  Luca Lopez Jr., MD   insulin (LANTUS SOLOSTAR U-100 INSULIN) glargine 100 units/mL (3mL) SubQ pen Inject 10 Units into the skin every evening.  Patient not taking: Reported on 5/24/2022 5/20/22 5/27/22  Bautista Lynch III, MD   metOLazone (ZAROXOLYN) 2.5 MG tablet Take 2.5 mg by mouth every other day. 11/25/21 4/25/22  Provider, Historical   metoprolol succinate (TOPROL-XL) 25 MG 24 hr tablet TAKE 1 TABLET(25 MG) BY MOUTH EVERY DAY 5/19/21 4/25/22  Luca Lopez Jr., MD        Medications this encounter:    [START ON 9/9/2024] amiodarone  200 mg Oral Daily    amiodarone  400 mg Oral BID    amLODIPine  5 mg Oral Daily    aspirin  81 mg Oral Daily    atorvastatin  40 mg Oral Daily    bumetanide  2 mg Oral BID loop    ceFAZolin (Ancef) IV (PEDS and ADULTS)  2 g Intravenous Q8H    DULoxetine  30 mg Oral Daily    eplerenone  25 mg Oral Daily    gabapentin  100 mg Oral BID    gabapentin  400 mg Oral QHS    insulin aspart U-100  16 Units Subcutaneous TIDWM "    insulin glargine U-100  24 Units Subcutaneous BID    levETIRAcetam  1,500 mg Oral BID    LIDOcaine  2 patch Transdermal Q24H    magnesium oxide  400 mg Oral Daily    micafungin  100 mg Intravenous Q24H    nicotine  1 patch Transdermal Daily    pantoprazole  40 mg Oral Daily    warfarin  1 mg Oral Every Mon, Wed, Fri    warfarin  2 mg Oral Every Tues, Thurs, Sat, Sun       Allergies: is allergic to torsemide.     Social:  reports that he quit smoking about 3 years ago. His smoking use included cigarettes. He started smoking about 19 years ago. He has a 8.3 pack-year smoking history. He has never used smokeless tobacco. He reports that he does not currently use alcohol. He reports that he does not currently use drugs after having used the following drugs: Marijuana and MDMA (Ecstacy).     ROS: As per HPI    Ocular examination/Dilated fundus examination:  Base Eye Exam       Visual Acuity (Snellen - Linear)         Right Left    Dist sc 20/40 20/30    Dist ph sc 20/20 20/30 -              Tonometry (Applanation, 9:37 AM)         Right Left    Pressure 18 16              Pupils         Dark Light Shape React APD    Right 5 3 Round Brisk None    Left 5 3 Round Brisk None              Visual Fields         Right Left     Full Full              Extraocular Movement         Right Left     Full, Ortho Full, Ortho                  Slit Lamp and Fundus Exam       External Exam         Right Left    External Normal Normal              Slit Lamp Exam         Right Left    Lids/Lashes Normal Normal    Conjunctiva/Sclera White and quiet White and quiet    Cornea Clear Clear    Anterior Chamber Deep and quiet Deep and quiet    Iris Round and reactive Round and reactive    Lens Clear Clear    Anterior Vitreous Normal Normal              Fundus Exam         Right Left    Disc Normal Normal    C/D Ratio 0.4 0.3    Macula Normal Normal    Vessels Normal Normal    Periphery Normal Chorioretinal scar inferonasal 7 o'clock                       Assessment/Plan:     1. Fungemia, rule out fungal endophthalmitis  - Patient with positive fungal blood cultures  - Patient asymptomatic and denies any new vision changes, eye pain, or flashes/curtains. 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 Type 2 diabetes, with insulin use, without ophthalmic complications, OU  - Last A1c 10.3 (8/25/2024). Per chart review, pts BS had previously been much better controlled, A1c 6.9 (8/31/2023)  - Will refer to retina outpatient for monitoring      RTC retina 2-3 months      Patient's Best Contact Number: [phone]     Luis Jackson MD   [employer] Ophthal[facility] PGY2  08/31/2024  9:37 AM    (Seen with Dr. Soria)      Common Ophthalmologic Abbreviations  OD: right eye  OS: left eye  OU: both eyes  IOP: intraocular pressure  VA: visual acuity  PH: pinhole  HM: hand motion  LP: light perception  NLP: no light perception  DFE: dilated fundus examination  SLE: slit lamp examination  RD: retinal detachment   AT: artificial tears  PFAT: preservative free artificial tears

## 2024-08-31 NOTE — PROGRESS NOTES
08/30/24 1848        VAD 06/29/22 1115 Left ventricular assist device HeartMate 3   Placement Date/Time: 06/29/22 1115   Present Prior to Hospital Arrival?: No  Inserted by: MD  VAD Type: Left ventricular assist device  VAD Brand: HeartMate 3   Site Location Abdomen right   Site Assessment Intact;Draining   Driveline Exit Site 2   Driveline Assessment Free of Kinks;Intact   Dressing Status Clean;Dry;Intact   Dressing Intervention Sterile dressing change   Performed By RN   Dressing Change Schedule Daily   Dressing Change Due 08/31/24   Integrity dry;intact   Driveline Casmalia in use Tape   Condition CDI   Date changed 08/30/24   Post Removal Complications None

## 2024-08-31 NOTE — SUBJECTIVE & OBJECTIVE
Interval History: No fevers documented overnight.   Blcx from 8/28 now with yeast. Picc removed 8/29.     Review of Systems   Constitutional:  Negative for chills and fever.   Eyes:  Negative for visual disturbance.   Genitourinary:  Positive for flank pain. Negative for difficulty urinating and dysuria.   Musculoskeletal:  Positive for back pain.   All other systems reviewed and are negative.    Objective:     Vital Signs (Most Recent):  Temp: 98.9 °F (37.2 °C) (08/31/24 0823)  Pulse: 85 (08/31/24 1036)  Resp: 18 (08/31/24 0823)  BP: (!) 78/0 (08/31/24 0823)  SpO2: 96 % (08/31/24 0823) Vital Signs (24h Range):  Temp:  [97.6 °F (36.4 °C)-98.9 °F (37.2 °C)] 98.9 °F (37.2 °C)  Pulse:  [77-91] 85  Resp:  [18-19] 18  SpO2:  [96 %-100 %] 96 %  BP: ()/(0-81) 78/0     Weight: 68.5 kg (151 lb 0.2 oz)  Body mass index is 18.88 kg/m².    Estimated Creatinine Clearance: 96.1 mL/min (based on SCr of 1 mg/dL).     Physical Exam  Constitutional:       General: He is not in acute distress.     Appearance: He is not ill-appearing or toxic-appearing.   Eyes:      General:         Right eye: No discharge.         Left eye: No discharge.   Pulmonary:      Effort: Pulmonary effort is normal. No respiratory distress.   Abdominal:      General: There is no distension.      Palpations: Abdomen is soft.      Tenderness: There is right CVA tenderness and left CVA tenderness.      Comments: LVAD dressed   Musculoskeletal:         General: Tenderness (lower back pain) present.   Skin:     General: Skin is warm and dry.   Neurological:      Mental Status: He is alert and oriented to person, place, and time.          Significant Labs:   Microbiology Results (last 7 days)       Procedure Component Value Units Date/Time    Blood culture [[id number]]  (Abnormal)  (Susceptibility) Collected: 08/27/24 1562    Order Status: Completed Specimen: Blood Updated: 08/31/24 1049     Blood Culture, Routine Gram stain aer bottle: Gram positive cocci in  clusters resembling Staph      Results called to and read back by:Miguelina Thomas RN 08/28/2024  22:59      MICROCOCCUS SPECIES  Organism is a probable contaminant        STAPHYLOCOCCUS AUREUS  ID consult required at [facility].      Blood culture [[id number]]  (Abnormal) Collected: 08/27/24 1751    Order Status: Completed Specimen: Blood Updated: 08/31/24 1041     Blood Culture, Routine Gram stain dudley bottle: Gram positive cocci in clusters resembling Staph      Results called to and read back by: Lorri Riggs RN  08/29/2024   13:50      Gram stain aer bottle: yeast      Results called to and read back by:Lorri Riggs RN 08/30/2024      STAPHYLOCOCCUS AUREUS  ID consult required at [facility].  For susceptibility see order #[id number]      Narrative:      Blood culture # 2 different location.    Blood culture [[id number]] Collected: 08/28/24 2226    Order Status: Completed Specimen: Blood Updated: 08/31/24 0955     Blood Culture, Routine Gram stain aer bottle: yeast      Gram stain dudley bottle: yeast      Positive results previously called 08/30/2024    Narrative:      Code 37219  Draw sample # 2 from separate site    Blood culture [[id number]] Collected: 08/28/24 2226    Order Status: Completed Specimen: Blood Updated: 08/31/24 0950     Blood Culture, Routine Gram stain aer bottle: yeast      Results called to and read back by:Lorri Riggs RN 08/30/2024  17:10    Narrative:      Code 71787  Draw sample # 2 from separate site    Blood culture [[id number]] Collected: 08/30/24 1152    Order Status: Completed Specimen: Blood Updated: 08/30/24 1945     Blood Culture, Routine No Growth to date    Narrative:      Lft AC    Blood culture [[id number]] Collected: 08/30/24 1152    Order Status: Completed Specimen: Blood Updated: 08/30/24 1945     Blood Culture, Routine No Growth to date    Narrative:      Rt AC    Rapid Organism ID by PCR (from Blood culture)  [[id number]] Collected: 08/28/24 2226    Order Status: Completed Updated: 08/30/24 1823     Enterococcus faecalis Not Detected     Enterococcus faecium Not Detected     Listeria monocytogenes Not Detected     Staphylococcus spp. Not Detected     Staphylococcus aureus Not Detected     Staphylococcus epidermidis Not Detected     Staphylococcus lugdunensis Not Detected     Streptococcus species Not Detected     Streptococcus agalactiae Not Detected     Streptococcus pneumoniae Not Detected     Streptococcus pyogenes Not Detected     Acinetobacter calcoaceticus/baumannii complex Not Detected     Bacteroides fragilis Not Detected     Enterobacterales Not Detected     Enterobacter cloacae complex Not Detected     Escherichia coli Not Detected     Klebsiella aerogenes Not Detected     Klebsiella oxytoca Not Detected     Klebsiella pneumoniae group Not Detected     Proteus Not Detected     Salmonella sp Not Detected     Serratia marcescens Not Detected     Haemophilus influenzae Not Detected     Neisseria meningtidis Not Detected     Pseudomonas aeruginosa Not Detected     Stenotrophomonas maltophilia Not Detected     Candida albicans Not Detected     Candida auris Not Detected     Candida glabrata Not Detected     Candida krusei Not Detected     Candida parapsilosis Not Detected     Candida tropicalis Not Detected     Cryptococcus neoformans/gattii Not Detected     CTX-M (ESBL ) Test Not Applicable     IMP (Carbapenem resistant) Test Not Applicable     KPC resistance gene (Carbapenem resistant) Test Not Applicable     mcr-1  Test Not Applicable     mec A/C  Test Not Applicable     mec A/C and MREJ (MRSA) gene Test Not Applicable     NDM (Carbapenem resistant) Test Not Applicable     OXA-48-like (Carbapenem resistant) Test Not Applicable     van A/B (VRE gene) Test Not Applicable     VIM (Carbapenem resistant) Test Not Applicable    Narrative:      Code 52992  Draw sample # 2 from separate site    Culture, Anaerobe  [[id number]] Collected: 08/25/24 1733    Order Status: Completed Specimen: Wound from Abdomen Updated: 08/30/24 1405     Anaerobic Culture No anaerobes isolated    Narrative:      Drive line exit site    Aerobic culture [[id number]]  (Abnormal)  (Susceptibility) Collected: 08/27/24 1446    Order Status: Completed Specimen: Skin from Abdomen Updated: 08/29/24 1424     Aerobic Bacterial Culture STAPHYLOCOCCUS AUREUS  Rare      Narrative:      Driveline site    Culture, Anaerobe [[id number]] Collected: 08/27/24 1446    Order Status: Completed Specimen: Skin from Abdomen Updated: 08/29/24 0946     Anaerobic Culture Culture in progress    Narrative:      Driveline site    MRSA/SA Rapid ID by PCR from Blood culture [[id number]]  (Abnormal) Collected: 08/27/24 1802    Order Status: Completed Updated: 08/29/24 0027     Staph aureus ID by PCR Positive     Methicillin Resistant ID by PCR Negative    Blood culture [[id number]]     Order Status: Canceled Specimen: Blood     Blood culture [[id number]]     Order Status: Canceled Specimen: Blood     Aerobic culture [[id number]] Collected: 08/25/24 1733    Order Status: Sent Specimen: Wound from Abdomen Updated: 08/25/24 1734            Significant Imaging: I have reviewed all pertinent imaging results/findings within the past 24 hours.

## 2024-09-01 LAB
ANION GAP SERPL CALC-SCNC: 11 MMOL/L (ref 8–16)
APTT PPP: 38.8 SEC (ref 21–32)
BASOPHILS # BLD AUTO: 0.06 K/UL (ref 0–0.2)
BASOPHILS NFR BLD: 0.5 % (ref 0–1.9)
BUN SERPL-MCNC: 21 MG/DL (ref 6–20)
CALCIUM SERPL-MCNC: 9.3 MG/DL (ref 8.7–10.5)
CHLORIDE SERPL-SCNC: 93 MMOL/L (ref 95–110)
CO2 SERPL-SCNC: 24 MMOL/L (ref 23–29)
CREAT SERPL-MCNC: 1.3 MG/DL (ref 0.5–1.4)
DIFFERENTIAL METHOD BLD: ABNORMAL
EOSINOPHIL # BLD AUTO: 0.1 K/UL (ref 0–0.5)
EOSINOPHIL NFR BLD: 0.5 % (ref 0–8)
ERYTHROCYTE [DISTWIDTH] IN BLOOD BY AUTOMATED COUNT: 27.9 % (ref 11.5–14.5)
EST. GFR  (NO RACE VARIABLE): >60 ML/MIN/1.73 M^2
GLUCOSE SERPL-MCNC: 199 MG/DL (ref 70–110)
HCT VFR BLD AUTO: 26.8 % (ref 40–54)
HGB BLD-MCNC: 7.8 G/DL (ref 14–18)
IMM GRANULOCYTES # BLD AUTO: 0.08 K/UL (ref 0–0.04)
IMM GRANULOCYTES NFR BLD AUTO: 0.7 % (ref 0–0.5)
INR PPP: 2.4 (ref 0.8–1.2)
LDH SERPL L TO P-CCNC: 316 U/L (ref 110–260)
LYMPHOCYTES # BLD AUTO: 1.4 K/UL (ref 1–4.8)
LYMPHOCYTES NFR BLD: 11.7 % (ref 18–48)
MAGNESIUM SERPL-MCNC: 1.8 MG/DL (ref 1.6–2.6)
MCH RBC QN AUTO: 19.4 PG (ref 27–31)
MCHC RBC AUTO-ENTMCNC: 29.1 G/DL (ref 32–36)
MCV RBC AUTO: 67 FL (ref 82–98)
MONOCYTES # BLD AUTO: 1 K/UL (ref 0.3–1)
MONOCYTES NFR BLD: 8.4 % (ref 4–15)
NEUTROPHILS # BLD AUTO: 9.6 K/UL (ref 1.8–7.7)
NEUTROPHILS NFR BLD: 78.2 % (ref 38–73)
NRBC BLD-RTO: 0 /100 WBC
PHOSPHATE SERPL-MCNC: 3.9 MG/DL (ref 2.7–4.5)
PLATELET # BLD AUTO: 510 K/UL (ref 150–450)
PMV BLD AUTO: 9.8 FL (ref 9.2–12.9)
POCT GLUCOSE: 107 MG/DL (ref 70–110)
POCT GLUCOSE: 125 MG/DL (ref 70–110)
POCT GLUCOSE: 165 MG/DL (ref 70–110)
POCT GLUCOSE: 187 MG/DL (ref 70–110)
POCT GLUCOSE: 214 MG/DL (ref 70–110)
POCT GLUCOSE: 225 MG/DL (ref 70–110)
POCT GLUCOSE: 90 MG/DL (ref 70–110)
POCT GLUCOSE: 97 MG/DL (ref 70–110)
POTASSIUM SERPL-SCNC: 4 MMOL/L (ref 3.5–5.1)
PROTHROMBIN TIME: 25.3 SEC (ref 9–12.5)
RBC # BLD AUTO: 4.02 M/UL (ref 4.6–6.2)
SODIUM SERPL-SCNC: 128 MMOL/L (ref 136–145)
WBC # BLD AUTO: 12.27 K/UL (ref 3.9–12.7)

## 2024-09-01 PROCEDURE — 83735 ASSAY OF MAGNESIUM: CPT | Performed by: STUDENT IN AN ORGANIZED HEALTH CARE EDUCATION/TRAINING PROGRAM

## 2024-09-01 PROCEDURE — 27000248 HC VAD-ADDITIONAL DAY

## 2024-09-01 PROCEDURE — 25000003 PHARM REV CODE 250: Performed by: INTERNAL MEDICINE

## 2024-09-01 PROCEDURE — 94761 N-INVAS EAR/PLS OXIMETRY MLT: CPT

## 2024-09-01 PROCEDURE — 63600175 PHARM REV CODE 636 W HCPCS: Performed by: STUDENT IN AN ORGANIZED HEALTH CARE EDUCATION/TRAINING PROGRAM

## 2024-09-01 PROCEDURE — 99900035 HC TECH TIME PER 15 MIN (STAT)

## 2024-09-01 PROCEDURE — 99232 SBSQ HOSP IP/OBS MODERATE 35: CPT | Mod: ,,, | Performed by: NURSE PRACTITIONER

## 2024-09-01 PROCEDURE — 85610 PROTHROMBIN TIME: CPT | Performed by: STUDENT IN AN ORGANIZED HEALTH CARE EDUCATION/TRAINING PROGRAM

## 2024-09-01 PROCEDURE — 25000003 PHARM REV CODE 250: Performed by: STUDENT IN AN ORGANIZED HEALTH CARE EDUCATION/TRAINING PROGRAM

## 2024-09-01 PROCEDURE — 84100 ASSAY OF PHOSPHORUS: CPT | Performed by: STUDENT IN AN ORGANIZED HEALTH CARE EDUCATION/TRAINING PROGRAM

## 2024-09-01 PROCEDURE — 83615 LACTATE (LD) (LDH) ENZYME: CPT | Performed by: STUDENT IN AN ORGANIZED HEALTH CARE EDUCATION/TRAINING PROGRAM

## 2024-09-01 PROCEDURE — 80048 BASIC METABOLIC PNL TOTAL CA: CPT | Performed by: STUDENT IN AN ORGANIZED HEALTH CARE EDUCATION/TRAINING PROGRAM

## 2024-09-01 PROCEDURE — 99233 SBSQ HOSP IP/OBS HIGH 50: CPT | Mod: ,,, | Performed by: INTERNAL MEDICINE

## 2024-09-01 PROCEDURE — 25000003 PHARM REV CODE 250

## 2024-09-01 PROCEDURE — 63600175 PHARM REV CODE 636 W HCPCS

## 2024-09-01 PROCEDURE — 63600175 PHARM REV CODE 636 W HCPCS: Performed by: NURSE PRACTITIONER

## 2024-09-01 PROCEDURE — 85730 THROMBOPLASTIN TIME PARTIAL: CPT | Performed by: STUDENT IN AN ORGANIZED HEALTH CARE EDUCATION/TRAINING PROGRAM

## 2024-09-01 PROCEDURE — 36415 COLL VENOUS BLD VENIPUNCTURE: CPT | Performed by: STUDENT IN AN ORGANIZED HEALTH CARE EDUCATION/TRAINING PROGRAM

## 2024-09-01 PROCEDURE — 87106 FUNGI IDENTIFICATION YEAST: CPT | Mod: 59 | Performed by: STUDENT IN AN ORGANIZED HEALTH CARE EDUCATION/TRAINING PROGRAM

## 2024-09-01 PROCEDURE — 87040 BLOOD CULTURE FOR BACTERIA: CPT | Mod: 59 | Performed by: STUDENT IN AN ORGANIZED HEALTH CARE EDUCATION/TRAINING PROGRAM

## 2024-09-01 PROCEDURE — 85025 COMPLETE CBC W/AUTO DIFF WBC: CPT | Performed by: STUDENT IN AN ORGANIZED HEALTH CARE EDUCATION/TRAINING PROGRAM

## 2024-09-01 PROCEDURE — 99233 SBSQ HOSP IP/OBS HIGH 50: CPT | Mod: ,,, | Performed by: STUDENT IN AN ORGANIZED HEALTH CARE EDUCATION/TRAINING PROGRAM

## 2024-09-01 PROCEDURE — 93750 INTERROGATION VAD IN PERSON: CPT | Mod: ,,, | Performed by: INTERNAL MEDICINE

## 2024-09-01 PROCEDURE — 20600001 HC STEP DOWN PRIVATE ROOM

## 2024-09-01 RX ORDER — INSULIN GLARGINE 100 [IU]/ML
20 INJECTION, SOLUTION SUBCUTANEOUS 2 TIMES DAILY
Status: DISCONTINUED | OUTPATIENT
Start: 2024-09-01 | End: 2024-09-02

## 2024-09-01 RX ORDER — INSULIN ASPART 100 [IU]/ML
12 INJECTION, SOLUTION INTRAVENOUS; SUBCUTANEOUS
Status: DISCONTINUED | OUTPATIENT
Start: 2024-09-01 | End: 2024-09-02

## 2024-09-01 RX ORDER — POLYETHYLENE GLYCOL 3350 17 G/17G
17 POWDER, FOR SOLUTION ORAL DAILY
Status: DISCONTINUED | OUTPATIENT
Start: 2024-09-01 | End: 2024-09-10 | Stop reason: HOSPADM

## 2024-09-01 RX ADMIN — CEFAZOLIN 2 G: 2 INJECTION, POWDER, FOR SOLUTION INTRAMUSCULAR; INTRAVENOUS at 08:09

## 2024-09-01 RX ADMIN — INSULIN GLARGINE 24 UNITS: 100 INJECTION, SOLUTION SUBCUTANEOUS at 09:09

## 2024-09-01 RX ADMIN — CEFAZOLIN 2 G: 2 INJECTION, POWDER, FOR SOLUTION INTRAMUSCULAR; INTRAVENOUS at 11:09

## 2024-09-01 RX ADMIN — AMLODIPINE BESYLATE 5 MG: 5 TABLET ORAL at 09:09

## 2024-09-01 RX ADMIN — OXYCODONE HYDROCHLORIDE 10 MG: 10 TABLET, FILM COATED, EXTENDED RELEASE ORAL at 05:09

## 2024-09-01 RX ADMIN — GABAPENTIN 400 MG: 400 CAPSULE ORAL at 08:09

## 2024-09-01 RX ADMIN — CEFAZOLIN 2 G: 2 INJECTION, POWDER, FOR SOLUTION INTRAMUSCULAR; INTRAVENOUS at 04:09

## 2024-09-01 RX ADMIN — INSULIN ASPART 12 UNITS: 100 INJECTION, SOLUTION INTRAVENOUS; SUBCUTANEOUS at 11:09

## 2024-09-01 RX ADMIN — INSULIN ASPART 4 UNITS: 100 INJECTION, SOLUTION INTRAVENOUS; SUBCUTANEOUS at 07:09

## 2024-09-01 RX ADMIN — GABAPENTIN 100 MG: 100 CAPSULE ORAL at 09:09

## 2024-09-01 RX ADMIN — INSULIN ASPART 16 UNITS: 100 INJECTION, SOLUTION INTRAVENOUS; SUBCUTANEOUS at 07:09

## 2024-09-01 RX ADMIN — AMIODARONE HYDROCHLORIDE 400 MG: 200 TABLET ORAL at 08:09

## 2024-09-01 RX ADMIN — INSULIN GLARGINE 20 UNITS: 100 INJECTION, SOLUTION SUBCUTANEOUS at 08:09

## 2024-09-01 RX ADMIN — INSULIN ASPART 12 UNITS: 100 INJECTION, SOLUTION INTRAVENOUS; SUBCUTANEOUS at 04:09

## 2024-09-01 RX ADMIN — PANTOPRAZOLE SODIUM 40 MG: 40 TABLET, DELAYED RELEASE ORAL at 09:09

## 2024-09-01 RX ADMIN — AMIODARONE HYDROCHLORIDE 400 MG: 200 TABLET ORAL at 09:09

## 2024-09-01 RX ADMIN — WARFARIN SODIUM 2 MG: 2 TABLET ORAL at 04:09

## 2024-09-01 RX ADMIN — GABAPENTIN 100 MG: 100 CAPSULE ORAL at 11:09

## 2024-09-01 RX ADMIN — ATORVASTATIN CALCIUM 40 MG: 20 TABLET, FILM COATED ORAL at 09:09

## 2024-09-01 RX ADMIN — Medication 400 MG: at 09:09

## 2024-09-01 RX ADMIN — INSULIN GLARGINE 24 UNITS: 100 INJECTION, SOLUTION SUBCUTANEOUS at 12:09

## 2024-09-01 RX ADMIN — BUMETANIDE 2 MG: 1 TABLET ORAL at 04:09

## 2024-09-01 RX ADMIN — LEVETIRACETAM 1500 MG: 500 TABLET, FILM COATED ORAL at 08:09

## 2024-09-01 RX ADMIN — ACETAMINOPHEN 650 MG: 325 TABLET ORAL at 05:09

## 2024-09-01 RX ADMIN — BUMETANIDE 2 MG: 1 TABLET ORAL at 09:09

## 2024-09-01 RX ADMIN — MICAFUNGIN SODIUM 100 MG: 100 INJECTION, POWDER, LYOPHILIZED, FOR SOLUTION INTRAVENOUS at 05:09

## 2024-09-01 RX ADMIN — LEVETIRACETAM 1500 MG: 500 TABLET, FILM COATED ORAL at 09:09

## 2024-09-01 RX ADMIN — ASPIRIN 81 MG: 81 TABLET, COATED ORAL at 09:09

## 2024-09-01 RX ADMIN — DULOXETINE HYDROCHLORIDE 30 MG: 30 CAPSULE, DELAYED RELEASE ORAL at 09:09

## 2024-09-01 RX ADMIN — EPLERENONE 25 MG: 25 TABLET, FILM COATED ORAL at 09:09

## 2024-09-01 NOTE — NURSING
"LVAD dressing change completed using sterile technique with daily kit DLES is a "2 "with minimal drainage noted on the drain sponge. Tolerated without any complication. Sutures remain intact, no redness, or tenderness noted.   "

## 2024-09-01 NOTE — ASSESSMENT & PLAN NOTE
Pt with unilateral R sided gynecomastia with hard nodular feeling beneath soft subcutaneous tissue of breast. No notable skin dimpling or rash.   CT with contrast reporting gyneocomastia without fluid collection/abscess. Pulmonary nodules seen on exam (needs f/up scan in 3-6 mos), enlarged lymph nodes noted on scan as well. D/w pt need for continued f/up outpatient for cancer screening. Appears to be more reactive than malignant on most recent scan.  -transition from spironolactone to epleronone

## 2024-09-01 NOTE — ASSESSMENT & PLAN NOTE
Updated 8/26/24:  EF 5-10% global hypokinesis, RV enlargement and global hypokinesis, biatrial enlargement, mild-to-moderate MR, severe TR, no van HeartMate 3 in place, aortic valve does not open, LVEDd 7.2  Previous TTE 9/5/23 with EF 10-15%, mod to sever TR, LVEDd 7.11, LVAD - HM3 in place    -continue on bumex 2mg BID, cont inpatient treatment for bacteremia and fungemia   -GDMT: d/c spironolactone given possible gynecomastia, transition to epleronone   -Previously on home Primacor 0.25 at home but ran out approx 5 days prior to admission and also reports he occasionally disconnects himself from his pump to perform certain activities. Will plan to d/c off primacor   - TTE With LVEF 5-10%, poor RV function, dilation, no signs of endocarditis on TTE   -palliative care c/s and pt discussed with service, appreciate f/up- plan for continued follow up with palliative outpatient as well with Dr. Carr   -repeated [facility] discussion myself with pt on 8/31- full code, aggressive care, pt need for compliance going forward re-iterated. Pt understanding, apologetic for previous noncompliance. Pt understands he is not a candidate for heart transplant and will not be evaluated due to his noncompliance.

## 2024-09-01 NOTE — PROGRESS NOTES
[facility] - Cardiology Stepdown  Endocrinology  Progress Note    Admit Date: 8/25/2024     Reason for Consult: Management of T2DM, Hyperglycemia     Surgical Procedure and Date: LVAD 06/29/2022     Diabetes diagnosis year: 2022    Home Diabetes Medications:  Levemir 20 units twice daily and Novolog SSI (150/25) per patient    How often checking glucose at home?  Has not checked in a few weeks due to running out of strips    BG readings on regimen: COLLIN  Hypoglycemia on the regimen?  Yes; rarely experiences hypoglycemic symptoms such as blurry vision and vomiting. However, does not know his blood sugar level due to running out of supplies. He will self-correct with a snack.  Missed doses on regimen?  Yes, has not had insulin in a few weeks due to running out of glucometer supplies.    Diabetes Complications include:   Hyperglycemia    Complicating diabetes co morbidities:   History of MI, CHF, and CKD      HPI:   Patient is a 39 y.o. 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 He underwent RVAD removal and chest closure 6/30/2022.  He was weaned off  but restarted due to RVF. He was transitioned to milrinone (secondary to  shortage). History of unclear syncope vs seizures and followed by neuro and is on Keppra. On 11/15/23 underwent removal of eroded [employer] scICD--no Lifevest (due to LVAD) and no plan to replace ICD as not requiring therapy post LVAD with concern for reinfection. Reported back pain (primarily upper back pain) for the past 3-4 days. He reported that it started 2 days after stopping pirmacor and he felt it may be related. Reported some shortness of breath after walking long distances which has been ongoing for months now and has not changed lately. He has lost some weight over last few months since his  "but weight has been stable over the last month. Patient admitted after being out of [facility] for 1 week with ongoing back pain. Patient stated he has not taken his insulin in a few weeks due to running out of glucometer testing supplies. He previously had a William, but is not wearing one. BG >700 at [facility]. Endo consulted for BG management.      Interval HPI:   Overnight events: Remains in CSU. BG variable on current SQ insulin regimen. Hypoglycemia noted yesterday afternoon. Diet diabetic Cardiac (Low Na/Chol); 2000 Calorie; Fluid - 1500mL; Standard Tray    Eatin%  Nausea: No  Hypoglycemia and intervention: Yes, BG 64 at 1856.   Fever: No  TPN and/or TF: No  If yes, type of TF/TPN and rate: n/a    BP (!) 86/0 (BP Location: Left arm, Patient Position: Sitting)   Pulse 83   Temp 98.5 °F (36.9 °C) (Oral)   Resp 18   Ht 6' 3" (1.905 m)   Wt 68.5 kg (151 lb 0.2 oz)   SpO2 99%   BMI 18.88 kg/m²     Labs Reviewed and Include    Recent Labs   Lab 24  0234   *   CALCIUM 9.3   *   K 4.0   CO2 24   CL 93*   BUN 21*   CREATININE 1.3     Lab Results   Component Value Date    WBC 12.27 2024    HGB 7.8 (L) 2024    HCT 26.8 (L) 2024    MCV 67 (L) 2024     (H) 2024     Recent Labs   Lab 24  1058   TSH 2.009     Lab Results   Component Value Date    HGBA1C 10.3 (H) 2024       Nutritional status:   Body mass index is 18.88 kg/m².  Lab Results   Component Value Date    ALBUMIN 2.5 (L) 2024    ALBUMIN 2.4 (L) 2024    ALBUMIN 2.2 (L) 2024     Lab Results   Component Value Date    PREALBUMIN 9 (L) 2024    PREALBUMIN 8 (L) 2024    PREALBUMIN 7 (L) 2024       Estimated Creatinine Clearance: 73.9 mL/min (based on SCr of 1.3 mg/dL).    Accu-Checks  Recent Labs     24  0640 24  0915 24  1138 24  1640 24  1856 24  2041 24  0024 24  0450 24  0558 24  0754 "   POCTGLUCOSE 214* 290* 229* 87 64* 90 225* 165* 187* 214*       Current Medications and/or Treatments Impacting Glycemic Control  Immunotherapy:    Immunosuppressants       None          Steroids:   Hormones (From admission, onward)      None          Pressors:    Autonomic Drugs (From admission, onward)      None          Hyperglycemia/Diabetes Medications:   Antihyperglycemics (From admission, onward)      Start     Stop Route Frequency Ordered    09/01/24 1130  insulin aspart U-100 pen 12 Units         -- SubQ 3 times daily with meals 09/01/24 0845    08/31/24 0900  insulin glargine U-100 (Lantus) pen 24 Units         -- SubQ 2 times daily 08/31/24 0827    08/25/24 1506  insulin aspart U-100 pen 0-10 Units         -- SubQ Before meals & nightly PRN 08/25/24 1407            ASSESSMENT and PLAN    Cardiac/Vascular  * LVAD (left ventricular assist device) present  Managed by primary team  Optimize blood glucose control        CHF (NYHA class IV, ACC/AHA stage D)  Managed by primary team  Optimize blood glucose control      Endocrine  Type 2 diabetes mellitus with hyperglycemia, with long-term current use of insulin  BG goal 140-180  Noncompliant T2DM.     Given hypoglycemia noted x 1 yesterday evening. Novolog given with dinner despite BG < 100.     Plan:   - Decrease Lantus (Insulin Glargine) to 20 units BID (20% dose decrease)  - Decrease Novolog (Insulin Aspart) to 12 units TIDWM.(20% dose reduction; continued hypoglycemia noted) HOLD if BG < 100  - Continue moderate dose correction (150/25)  - BG checks AC/HS  - Hypoglycemia protocol in place    ** Please notify Endocrine for any change and/or advance in diet**  ** Please call Endocrine for any BG related issues **    Discharge Planning:   TBD. Please notify endocrinology prior to discharge.  Patient requires glucometer and testing supplies.  Recommend he resumes his William for blood sugar monitoring.          Jody Starkey NP  Endocrinology  [facility] -  Cardiology Stepdown

## 2024-09-01 NOTE — PROGRESS NOTES
09/01/2024  Mirela Perez    Current provider:  Dalia Crum MD    Device interrogation:      9/1/2024    11:26 AM 9/1/2024     9:37 AM 9/1/2024     5:03 AM 9/1/2024    12:30 AM 8/31/2024     9:02 PM 8/31/2024     4:16 PM 8/31/2024    12:01 PM   TXP LVAD INTERROGATIONS   Type HeartMate3 HeartMate3 HeartMate3 HeartMate3 HeartMate3 HeartMate3 HeartMate3   Flow 3.8 4.1 4.2 4 3.7 4.5 4   Speed 5100 5100 5100 5100 5100 5100 5100   PI 6.2 5.5 5.2 5.9 6.6 3.1 6.1   Power (Serna) 3.6 3.6 3.6 3.5 3.6 3.5 3.6   LSL 4700 4700 4700 4700   4700   Pulsatility   Pulse Pulse Pulse            Rounded on Kevan Queen to ensure all mechanical assist device settings (IABP or VAD) were appropriate and all parameters were within limits.  I was able to ensure all back up equipment was present, the staff had no issues, and the Perfusion Department daily rounding was complete.      For implantable VADs: Interrogation of Ventricular assist device was performed with analysis of device parameters and review of device function. I have personally reviewed the interrogation findings and agree with findings as stated.     In emergency, the nursing units have been notified to contact the perfusion department either by:  Calling [id number] from 630am to 4pm Mon thru Fri, utilizing the On-Call Finder functionality of Epic and searching for Perfusion, or by contacting the hospital  from 4pm to 630am and on weekends and asking to speak with the perfusionist on call.    4:18 PM

## 2024-09-01 NOTE — SUBJECTIVE & OBJECTIVE
Interval History:   Pt doing well overnight, still denies any chest pain, shortness of breath, lightheadedness, dizziness, fevers or chills overnight. Cont to have very significant back pain. Multimodal pain regimen with tylenol, lidocaine patch and oxycodone prn. Pt reports still in great pain and does not want to get out of bed because of this. Can consider additional prn if unable to tolerate pain with next oxycodone dose.     CT spine cervical, thoracic and lumbar negative for discitis/infectious concern and renal collection/pyelo appears to be unchanged.     MSSA bacteremia- on cefazolin. PICC removed on 8/29. Yeast growing in blood as well- initiated micafungin on 8/30, d/w ID. No symptoms per patient/no fevers/no WBC. Microccous likely contaminant.  Sources are from LVAD  driveline vs Previous PICC. ID is following. Continuing on current regimen of cefazolin and micafungin.     Responding well to 2 mg of Bumex for diuresis, no issue any allergic reaction.  Kidney function at baseline and euvolemic/ no JVD. No abdominal distension, abdomen soft. Continue bumex.         Continuous Infusions: none       Scheduled Meds:   [START ON 9/9/2024] amiodarone  200 mg Oral Daily    amiodarone  400 mg Oral BID    amLODIPine  5 mg Oral Daily    aspirin  81 mg Oral Daily    atorvastatin  40 mg Oral Daily    bumetanide  2 mg Oral BID loop    ceFAZolin (Ancef) IV (PEDS and ADULTS)  2 g Intravenous Q8H    DULoxetine  30 mg Oral Daily    eplerenone  25 mg Oral Daily    gabapentin  100 mg Oral BID    gabapentin  400 mg Oral QHS    insulin aspart U-100  12 Units Subcutaneous TIDWM    insulin glargine U-100  20 Units Subcutaneous BID    levETIRAcetam  1,500 mg Oral BID    LIDOcaine  2 patch Transdermal Q24H    magnesium oxide  400 mg Oral Daily    micafungin  100 mg Intravenous Q24H    nicotine  1 patch Transdermal Daily    pantoprazole  40 mg Oral Daily    polyethylene glycol  17 g Oral Daily    warfarin  1 mg Oral Every Mon,  Wed, Fri    warfarin  2 mg Oral Every Tues, Thurs, Sat, Sun     PRN Meds:  Current Facility-Administered Medications:     acetaminophen, 650 mg, Oral, Q6H PRN    cyclobenzaprine, 5 mg, Oral, Daily PRN    dextrose 10%, 12.5 g, Intravenous, PRN    dextrose 10%, 25 g, Intravenous, PRN    glucagon (human recombinant), 1 mg, Intramuscular, PRN    glucose, 16 g, Oral, PRN    glucose, 24 g, Oral, PRN    insulin aspart U-100, 0-10 Units, Subcutaneous, QID (AC + HS) PRN    ondansetron, 4 mg, Oral, Q8H PRN    oxyCODONE, 10 mg, Oral, Daily PRN    Review of patient's allergies indicates:   Allergen Reactions    Torsemide Hives     Objective:     Vital Signs (Most Recent):  Temp: 98.3 °F (36.8 °C) (09/01/24 1117)  Pulse: 89 (09/01/24 1200)  Resp: 18 (09/01/24 1117)  BP: (!) 82/0 (09/01/24 1117)  SpO2: 98 % (09/01/24 1117) Vital Signs (24h Range):  Temp:  [97.7 °F (36.5 °C)-98.9 °F (37.2 °C)] 98.3 °F (36.8 °C)  Pulse:  [78-93] 89  Resp:  [18] 18  SpO2:  [97 %-100 %] 98 %  BP: ()/(0-89) 82/0     No data found.    Body mass index is 18.88 kg/m².      Intake/Output Summary (Last 24 hours) at 9/1/2024 1247  Last data filed at 9/1/2024 1218  Gross per 24 hour   Intake 1320 ml   Output 1725 ml   Net -405 ml       Hemodynamic Parameters:       EKG- without ischemic changes this AM        Physical Exam  Constitutional:       General: He is not in acute distress.     Appearance: He is normal weight.   HENT:      Head: Normocephalic and atraumatic.      Right Ear: External ear normal.      Left Ear: External ear normal.      Nose: Nose normal.      Mouth/Throat:      Pharynx: Oropharynx is clear.   Cardiovascular:      Comments: Normal VAD hum, JVD present but improving   Pulmonary:      Effort: Pulmonary effort is normal.   Abdominal:      General: Abdomen is flat. Bowel sounds are normal. There is no distension (Distended but soft.).      Palpations: Abdomen is soft. There is no mass.      Tenderness: There is no abdominal  tenderness. There is right CVA tenderness.   Musculoskeletal:      Cervical back: Normal range of motion.      Right lower leg: No edema.      Left lower leg: No edema.      Comments: Cont b/l back pain- upper spine and b/l paraspinal/upper back muscular pain.    Skin:     General: Skin is warm.      Comments: Chest wall on the right side with gynecomastia, no overlying skin changes, no tenderness at the site however palpable mass versus nodule present.   Neurological:      Mental Status: He is alert. Mental status is at baseline.            Significant Labs:  CBC:  Recent Labs   Lab 08/30/24  0308 08/31/24  0244 09/01/24  0234   WBC 9.74 10.71 12.27   RBC 4.05* 4.08* 4.02*   HGB 8.0* 7.9* 7.8*   HCT 26.7* 27.3* 26.8*    439 510*   MCV 66* 67* 67*   MCH 19.8* 19.4* 19.4*   MCHC 30.0* 28.9* 29.1*     BNP:  Recent Labs   Lab 08/26/24  0508 08/28/24  0345 08/30/24  0308   * 376* 462*     CMP:  Recent Labs   Lab 08/26/24  0508 08/26/24  2221 08/28/24  0345 08/29/24  0346 08/30/24  0308 08/31/24  0244 09/01/24  0234   *   < > 219*   < > 287* 96 199*   CALCIUM 8.8   < > 9.0   < > 9.1 9.3 9.3   ALBUMIN 2.2*  --  2.4*  --  2.5*  --   --    PROT 7.4  --  7.6  --  8.5*  --   --    *   < > 131*   < > 130* 134* 128*   K 4.0   < > 3.6   < > 4.1 3.6 4.0   CO2 29   < > 26   < > 25 26 24   CL 94*   < > 92*   < > 95 94* 93*   BUN 13   < > 19   < > 21* 21* 21*   CREATININE 1.0   < > 1.1   < > 1.3 1.0 1.3   ALKPHOS 199*  --  215*  --  221*  --   --    ALT 13  --  13  --  11  --   --    AST 23  --  24  --  31  --   --    BILITOT 2.2*  --  2.4*  --  2.0*  --   --     < > = values in this interval not displayed.      Coagulation:   Recent Labs   Lab 08/30/24 0308 08/31/24 0244 09/01/24  0234   INR 2.9* 2.5* 2.4*   APTT 40.1* 39.0* 38.8*     LDH:  Recent Labs   Lab 08/30/24 0308 08/31/24 0244 09/01/24  0234    289* 316*     Microbiology:  Microbiology Results (last 7 days)       Procedure Component  Value Units Date/Time    Blood culture [[id number]] Collected: 09/01/24 0939    Order Status: Sent Specimen: Blood Updated: 09/01/24 1017    Blood culture [[id number]] Collected: 09/01/24 0939    Order Status: Sent Specimen: Blood Updated: 09/01/24 1017    Blood culture [[id number]]  (Abnormal) Collected: 08/27/24 1751    Order Status: Completed Specimen: Blood Updated: 09/01/24 0951     Blood Culture, Routine Gram stain dudley bottle: Gram positive cocci in clusters resembling Staph      Results called to and read back by: Lorri Riggs RN  08/29/2024   13:50      Gram stain aer bottle: yeast      Results called to and read back by:Lorri Riggs RN 08/30/2024      STAPHYLOCOCCUS AUREUS  ID consult required at [facility],[place] and [facility] locations.  For susceptibility see order #[id number]      Narrative:      Blood culture # 2 different location.    Blood culture [[id number]] Collected: 08/28/24 2226    Order Status: Completed Specimen: Blood Updated: 09/01/24 0947     Blood Culture, Routine Gram stain aer bottle: yeast      Gram stain dudley bottle: yeast      Positive results previously called 08/30/2024    Narrative:      Code 00281  Draw sample # 2 from separate site    Blood culture [[id number]] Collected: 08/28/24 2226    Order Status: Completed Specimen: Blood Updated: 09/01/24 0946     Blood Culture, Routine Gram stain aer bottle: yeast      Results called to and read back by:Lorri Riggs RN 08/30/2024  17:10    Narrative:      Code 46990  Draw sample # 2 from separate site    Blood culture [[id number]] Collected: 08/30/24 1152    Order Status: Completed Specimen: Blood Updated: 09/01/24 0206     Blood Culture, Routine Gram stain dudley bottle: yeast      Results called to and read back by: Rufino 09/01/2024  02:03    Narrative:      Lft AC    Blood culture [[id number]] Collected: 08/30/24 1152    Order Status: Completed Specimen: Blood Updated: 08/31/24 1412     Blood Culture, Routine No Growth to date      No  Growth to date    Narrative:      Rt AC    Blood culture [[id number]]  (Abnormal)  (Susceptibility) Collected: 08/27/24 1802    Order Status: Completed Specimen: Blood Updated: 08/31/24 1049     Blood Culture, Routine Gram stain aer bottle: Gram positive cocci in clusters resembling Staph      Results called to and read back by:Miguelina Thomas RN 08/28/2024  22:59      MICROCOCCUS SPECIES  Organism is a probable contaminant        STAPHYLOCOCCUS AUREUS  [facility].      Rapid Organism ID by PCR (from Blood culture) [[id number]] Collected: 08/28/24 2226    Order Status: Completed Updated: 08/30/24 1823     Enterococcus faecalis Not Detected     Enterococcus faecium Not Detected     Listeria monocytogenes Not Detected     Staphylococcus spp. Not Detected     Staphylococcus aureus Not Detected     Staphylococcus epidermidis Not Detected     Staphylococcus lugdunensis Not Detected     Streptococcus species Not Detected     Streptococcus agalactiae Not Detected     Streptococcus pneumoniae Not Detected     Streptococcus pyogenes Not Detected     Acinetobacter calcoaceticus/baumannii complex Not Detected     Bacteroides fragilis Not Detected     Enterobacterales Not Detected     Enterobacter cloacae complex Not Detected     Escherichia coli Not Detected     Klebsiella aerogenes Not Detected     Klebsiella oxytoca Not Detected     Klebsiella pneumoniae group Not Detected     Proteus Not Detected     Salmonella sp Not Detected     Serratia marcescens Not Detected     Haemophilus influenzae Not Detected     Neisseria meningtidis Not Detected     Pseudomonas aeruginosa Not Detected     Stenotrophomonas maltophilia Not Detected     Candida albicans Not Detected     Candida auris Not Detected     Candida glabrata Not Detected     Candida krusei Not Detected     Candida parapsilosis Not Detected     Candida tropicalis Not Detected     Cryptococcus neoformans/gattii Not  Detected     CTX-M (ESBL ) Test Not Applicable     IMP (Carbapenem resistant) Test Not Applicable     KPC resistance gene (Carbapenem resistant) Test Not Applicable     mcr-1  Test Not Applicable     mec A/C  Test Not Applicable     mec A/C and MREJ (MRSA) gene Test Not Applicable     NDM (Carbapenem resistant) Test Not Applicable     OXA-48-like (Carbapenem resistant) Test Not Applicable     van A/B (VRE gene) Test Not Applicable     VIM (Carbapenem resistant) Test Not Applicable    Narrative:      Code 55217  Draw sample # 2 from separate site    Culture, Anaerobe [[id number]] Collected: 08/25/24 1733    Order Status: Completed Specimen: Wound from Abdomen Updated: 08/30/24 1405     Anaerobic Culture No anaerobes isolated    Narrative:      Drive line exit site    Aerobic culture [[id number]]  (Abnormal)  (Susceptibility) Collected: 08/27/24 1446    Order Status: Completed Specimen: Skin from Abdomen Updated: 08/29/24 1424     Aerobic Bacterial Culture STAPHYLOCOCCUS AUREUS  Rare      Narrative:      Driveline site    Culture, Anaerobe [[id number]] Collected: 08/27/24 1446    Order Status: Completed Specimen: Skin from Abdomen Updated: 08/29/24 0946     Anaerobic Culture Culture in progress    Narrative:      Driveline site    MRSA/SA Rapid ID by PCR from Blood culture [[id number]]  (Abnormal) Collected: 08/27/24 1802    Order Status: Completed Updated: 08/29/24 0027     Staph aureus ID by PCR Positive     Methicillin Resistant ID by PCR Negative    Blood culture [[id number]]     Order Status: Canceled Specimen: Blood     Blood culture [[id number]]     Order Status: Canceled Specimen: Blood     Aerobic culture [[id number]] Collected: 08/25/24 1733    Order Status: Sent Specimen: Wound from Abdomen Updated: 08/25/24 1734            I have reviewed all pertinent labs within the past 24 hours.    Estimated Creatinine Clearance: 73.9 mL/min (based on SCr of 1.3 mg/dL).    Diagnostic Results:  I have reviewed  and interpreted all pertinent imaging results/findings within the past 24 hours.

## 2024-09-01 NOTE — ASSESSMENT & PLAN NOTE
-HeartMate 3 Implanted  6/29/2022  as DT  -HTS Primary  -cont coumadin, Goal INR 2.0-3.0.   Lab Results   Component Value Date    INR 2.4 (H) 09/01/2024    INR 2.5 (H) 08/31/2024    INR 2.9 (H) 08/30/2024       -Antiplatelets ASA 81 mg  -LDH is stable overall today. Will continue to monitor daily.  -Speed set at 5100, LSL 4700 rpm  -Interrogation notable for  power cable disconnected, low voltage advisory, PI events  -Not listed for OHTx- advised pt he would not be candidate for OHTx (noncompliance/poor f/up)   -UDS negative  -attempted to call partner Robyn, unfortunately goes to        Procedure: Device Interrogation Including analysis of device parameters  Current Settings: Ventricular Assist Device  Review of device function is stable/unstable stable        9/1/2024    11:26 AM 9/1/2024     9:37 AM 9/1/2024     5:03 AM 9/1/2024    12:30 AM 8/31/2024     9:02 PM 8/31/2024     4:16 PM 8/31/2024    12:01 PM   TXP LVAD INTERROGATIONS   Type HeartMate3 HeartMate3 HeartMate3 HeartMate3 HeartMate3 HeartMate3 HeartMate3   Flow 3.8 4.1 4.2 4 3.7 4.5 4   Speed 5100 5100 5100 5100 5100 5100 5100   PI 6.2 5.5 5.2 5.9 6.6 3.1 6.1   Power (Serna) 3.6 3.6 3.6 3.5 3.6 3.5 3.6   LSL 4700 4700 4700 4700   4700   Pulsatility   Pulse Pulse Pulse

## 2024-09-01 NOTE — PROGRESS NOTES
[facility] - Cardiology Stepdown  Heart Transplant  Progress Note    Patient Name: Kevan Queen  MRN: [id number]  Admission Date: 8/25/2024  Hospital Length of Stay: 7 days  Attending Physician: Dalia Crum MD  Primary Care Provider: Rosio Armendariz FNP  Principal Problem:LVAD (left ventricular assist device) present    Subjective:     Interval History:   Pt doing well overnight, still denies any chest pain, shortness of breath, lightheadedness, dizziness, fevers or chills overnight. Cont to have very significant back pain. Multimodal pain regimen with tylenol, lidocaine patch and oxycodone prn. Pt reports still in great pain and does not want to get out of bed because of this. Can consider additional prn if unable to tolerate pain with next oxycodone dose.     CT spine cervical, thoracic and lumbar negative for discitis/infectious concern and renal collection/pyelo appears to be unchanged.     MSSA bacteremia- on cefazolin. PICC removed on 8/29. Yeast growing in blood as well- initiated micafungin on 8/30, d/w ID. No symptoms per patient/no fevers/no WBC. Microccous likely contaminant.  Sources are from LVAD  driveline vs Previous PICC. ID is following. Continuing on current regimen of cefazolin and micafungin.     Responding well to 2 mg of Bumex for diuresis, no issue any allergic reaction.  Kidney function at baseline and euvolemic/ no JVD. No abdominal distension, abdomen soft. Continue bumex.         Continuous Infusions: none       Scheduled Meds:   [START ON 9/9/2024] amiodarone  200 mg Oral Daily    amiodarone  400 mg Oral BID    amLODIPine  5 mg Oral Daily    aspirin  81 mg Oral Daily    atorvastatin  40 mg Oral Daily    bumetanide  2 mg Oral BID loop    ceFAZolin (Ancef) IV (PEDS and ADULTS)  2 g Intravenous Q8H    DULoxetine  30 mg Oral Daily    eplerenone  25 mg Oral Daily    gabapentin  100 mg Oral BID    gabapentin  400 mg Oral QHS    insulin aspart U-100  12 Units Subcutaneous TIDWM     insulin glargine U-100  20 Units Subcutaneous BID    levETIRAcetam  1,500 mg Oral BID    LIDOcaine  2 patch Transdermal Q24H    magnesium oxide  400 mg Oral Daily    micafungin  100 mg Intravenous Q24H    nicotine  1 patch Transdermal Daily    pantoprazole  40 mg Oral Daily    polyethylene glycol  17 g Oral Daily    warfarin  1 mg Oral Every Mon, Wed, Fri    warfarin  2 mg Oral Every Tues, Thurs, Sat, Sun     PRN Meds:  Current Facility-Administered Medications:     acetaminophen, 650 mg, Oral, Q6H PRN    cyclobenzaprine, 5 mg, Oral, Daily PRN    dextrose 10%, 12.5 g, Intravenous, PRN    dextrose 10%, 25 g, Intravenous, PRN    glucagon (human recombinant), 1 mg, Intramuscular, PRN    glucose, 16 g, Oral, PRN    glucose, 24 g, Oral, PRN    insulin aspart U-100, 0-10 Units, Subcutaneous, QID (AC + HS) PRN    ondansetron, 4 mg, Oral, Q8H PRN    oxyCODONE, 10 mg, Oral, Daily PRN    Review of patient's allergies indicates:   Allergen Reactions    Torsemide Hives     Objective:     Vital Signs (Most Recent):  Temp: 98.3 °F (36.8 °C) (09/01/24 1117)  Pulse: 89 (09/01/24 1200)  Resp: 18 (09/01/24 1117)  BP: (!) 82/0 (09/01/24 1117)  SpO2: 98 % (09/01/24 1117) Vital Signs (24h Range):  Temp:  [97.7 °F (36.5 °C)-98.9 °F (37.2 °C)] 98.3 °F (36.8 °C)  Pulse:  [78-93] 89  Resp:  [18] 18  SpO2:  [97 %-100 %] 98 %  BP: ()/(0-89) 82/0     No data found.    Body mass index is 18.88 kg/m².      Intake/Output Summary (Last 24 hours) at 9/1/2024 1247  Last data filed at 9/1/2024 1218  Gross per 24 hour   Intake 1320 ml   Output 1725 ml   Net -405 ml       Hemodynamic Parameters:       EKG- without ischemic changes this AM        Physical Exam  Constitutional:       General: He is not in acute distress.     Appearance: He is normal weight.   HENT:      Head: Normocephalic and atraumatic.      Right Ear: External ear normal.      Left Ear: External ear normal.      Nose: Nose normal.      Mouth/Throat:      Pharynx: Oropharynx is  clear.   Cardiovascular:      Comments: Normal VAD hum, JVD present but improving   Pulmonary:      Effort: Pulmonary effort is normal.   Abdominal:      General: Abdomen is flat. Bowel sounds are normal. There is no distension (Distended but soft.).      Palpations: Abdomen is soft. There is no mass.      Tenderness: There is no abdominal tenderness. There is right CVA tenderness.   Musculoskeletal:      Cervical back: Normal range of motion.      Right lower leg: No edema.      Left lower leg: No edema.      Comments: Cont b/l back pain- upper spine and b/l paraspinal/upper back muscular pain.    Skin:     General: Skin is warm.      Comments: Chest wall on the right side with gynecomastia, no overlying skin changes, no tenderness at the site however palpable mass versus nodule present.   Neurological:      Mental Status: He is alert. Mental status is at baseline.            Significant Labs:  CBC:  Recent Labs   Lab 08/30/24  0308 08/31/24  0244 09/01/24  0234   WBC 9.74 10.71 12.27   RBC 4.05* 4.08* 4.02*   HGB 8.0* 7.9* 7.8*   HCT 26.7* 27.3* 26.8*    439 510*   MCV 66* 67* 67*   MCH 19.8* 19.4* 19.4*   MCHC 30.0* 28.9* 29.1*     BNP:  Recent Labs   Lab 08/26/24  0508 08/28/24  0345 08/30/24  0308   * 376* 462*     CMP:  Recent Labs   Lab 08/26/24  0508 08/26/24  2221 08/28/24  0345 08/29/24  0346 08/30/24  0308 08/31/24  0244 09/01/24  0234   *   < > 219*   < > 287* 96 199*   CALCIUM 8.8   < > 9.0   < > 9.1 9.3 9.3   ALBUMIN 2.2*  --  2.4*  --  2.5*  --   --    PROT 7.4  --  7.6  --  8.5*  --   --    *   < > 131*   < > 130* 134* 128*   K 4.0   < > 3.6   < > 4.1 3.6 4.0   CO2 29   < > 26   < > 25 26 24   CL 94*   < > 92*   < > 95 94* 93*   BUN 13   < > 19   < > 21* 21* 21*   CREATININE 1.0   < > 1.1   < > 1.3 1.0 1.3   ALKPHOS 199*  --  215*  --  221*  --   --    ALT 13  --  13  --  11  --   --    AST 23  --  24  --  31  --   --    BILITOT 2.2*  --  2.4*  --  2.0*  --   --     < > =  values in this interval not displayed.      Coagulation:   Recent Labs   Lab 08/30/24  0308 08/31/24  0244 09/01/24  0234   INR 2.9* 2.5* 2.4*   APTT 40.1* 39.0* 38.8*     LDH:  Recent Labs   Lab 08/30/24  0308 08/31/24  0244 09/01/24  0234    289* 316*     Microbiology:  Microbiology Results (last 7 days)       Procedure Component Value Units Date/Time    Blood culture [[id number]] Collected: 09/01/24 0939    Order Status: Sent Specimen: Blood Updated: 09/01/24 1017    Blood culture [[id number]] Collected: 09/01/24 0939    Order Status: Sent Specimen: Blood Updated: 09/01/24 1017    Blood culture [[id number]]  (Abnormal) Collected: 08/27/24 1751    Order Status: Completed Specimen: Blood Updated: 09/01/24 0951     Blood Culture, Routine Gram stain dudley bottle: Gram positive cocci in clusters resembling Staph      Results called to and read back by: Lorri Riggs RN  08/29/2024   13:50      Gram stain aer bottle: yeast      Results called to and read back by:Lorri Riggs RN 08/30/2024      STAPHYLOCOCCUS AUREUS  ID consult required at [facility],[place] and [facility] locations.  For susceptibility see order #[id number]      Narrative:      Blood culture # 2 different location.    Blood culture [[id number]] Collected: 08/28/24 2226    Order Status: Completed Specimen: Blood Updated: 09/01/24 0947     Blood Culture, Routine Gram stain aer bottle: yeast      Gram stain dudley bottle: yeast      Positive results previously called 08/30/2024    Narrative:      Code 69857  Draw sample # 2 from separate site    Blood culture [[id number]] Collected: 08/28/24 2226    Order Status: Completed Specimen: Blood Updated: 09/01/24 0946     Blood Culture, Routine Gram stain aer bottle: yeast      Results called to and read back by:Lorri Riggs RN 08/30/2024  17:10    Narrative:      Code 90219  Draw sample # 2 from separate site    Blood culture [[id number]] Collected: 08/30/24 1152    Order Status: Completed Specimen: Blood Updated:  09/01/24 0206     Blood Culture, Routine Gram stain dudley bottle: yeast      Results called to and read back by: Rufino 09/01/2024  02:03    Narrative:      Lft AC    Blood culture [[id number]] Collected: 08/30/24 1152    Order Status: Completed Specimen: Blood Updated: 08/31/24 1412     Blood Culture, Routine No Growth to date      No Growth to date    Narrative:      Rt AC    Blood culture [[id number]]  (Abnormal)  (Susceptibility) Collected: 08/27/24 1802    Order Status: Completed Specimen: Blood Updated: 08/31/24 1049     Blood Culture, Routine Gram stain aer bottle: Gram positive cocci in clusters resembling Staph      Results called to and read back by:Miguelina [facility] RN 08/28/2024  22:59      MICROCOCCUS SPECIES  Organism is a probable contaminant        STAPHYLOCOCCUS AUREUS  ID consult required at [facility].On license of [facility],[place] and [facility] locations.      Rapid Organism ID by PCR (from Blood culture) [[id number]] Collected: 08/28/24 2226    Order Status: Completed Updated: 08/30/24 1823     Enterococcus faecalis Not Detected     Enterococcus faecium Not Detected     Listeria monocytogenes Not Detected     Staphylococcus spp. Not Detected     Staphylococcus aureus Not Detected     Staphylococcus epidermidis Not Detected     Staphylococcus lugdunensis Not Detected     Streptococcus species Not Detected     Streptococcus agalactiae Not Detected     Streptococcus pneumoniae Not Detected     Streptococcus pyogenes Not Detected     Acinetobacter calcoaceticus/baumannii complex Not Detected     Bacteroides fragilis Not Detected     Enterobacterales Not Detected     Enterobacter cloacae complex Not Detected     Escherichia coli Not Detected     Klebsiella aerogenes Not Detected     Klebsiella oxytoca Not Detected     Klebsiella pneumoniae group Not Detected     Proteus Not Detected     Salmonella sp Not Detected     Serratia marcescens Not Detected     Haemophilus influenzae Not Detected     Neisseria meningtidis Not Detected      Pseudomonas aeruginosa Not Detected     Stenotrophomonas maltophilia Not Detected     Candida albicans Not Detected     Candida auris Not Detected     Candida glabrata Not Detected     Candida krusei Not Detected     Candida parapsilosis Not Detected     Candida tropicalis Not Detected     Cryptococcus neoformans/gattii Not Detected     CTX-M (ESBL ) Test Not Applicable     IMP (Carbapenem resistant) Test Not Applicable     KPC resistance gene (Carbapenem resistant) Test Not Applicable     mcr-1  Test Not Applicable     mec A/C  Test Not Applicable     mec A/C and MREJ (MRSA) gene Test Not Applicable     NDM (Carbapenem resistant) Test Not Applicable     OXA-48-like (Carbapenem resistant) Test Not Applicable     van A/B (VRE gene) Test Not Applicable     VIM (Carbapenem resistant) Test Not Applicable    Narrative:      Code 69446  Draw sample # 2 from separate site    Culture, Anaerobe [[id number]] Collected: 08/25/24 1733    Order Status: Completed Specimen: Wound from Abdomen Updated: 08/30/24 1405     Anaerobic Culture No anaerobes isolated    Narrative:      Drive line exit site    Aerobic culture [[id number]]  (Abnormal)  (Susceptibility) Collected: 08/27/24 1446    Order Status: Completed Specimen: Skin from Abdomen Updated: 08/29/24 1424     Aerobic Bacterial Culture STAPHYLOCOCCUS AUREUS  Rare      Narrative:      Driveline site    Culture, Anaerobe [[id number]] Collected: 08/27/24 1446    Order Status: Completed Specimen: Skin from Abdomen Updated: 08/29/24 0946     Anaerobic Culture Culture in progress    Narrative:      Driveline site    MRSA/SA Rapid ID by PCR from Blood culture [[id number]]  (Abnormal) Collected: 08/27/24 1802    Order Status: Completed Updated: 08/29/24 0027     Staph aureus ID by PCR Positive     Methicillin Resistant ID by PCR Negative    Blood culture [[id number]]     Order Status: Canceled Specimen: Blood     Blood culture [[id number]]     Order Status: Canceled  Specimen: Blood     Aerobic culture [[id number]] Collected: 08/25/24 1733    Order Status: Sent Specimen: Wound from Abdomen Updated: 08/25/24 1734            I have reviewed all pertinent labs within the past 24 hours.    Estimated Creatinine Clearance: 73.9 mL/min (based on SCr of 1.3 mg/dL).    Diagnostic Results:  I have reviewed and interpreted all pertinent imaging results/findings within the past 24 hours.  Assessment and Plan:     Mr. Kevan Thacker is a 39 year old male with stage D 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 He underwent RVAD removal and chest closure 6/30/2022.  He was weaned off  but he had to restarted due to RVF. He was eventually transitioned to milrinone (secondary to  shortage) now supposed to be on 0.25 mcg/kg/min. He has a history of unclear syncope vs seizures and is followed by neuro for this and is on Keppra.  [facility].      On 11/15/23 underwent removal of eroded [employer] scICD--no Lifevest (due to LVAD) and no plan to replace ICD as not requiring therapy post LVAD with concern for reinfection.  He has not had neurology f/u with seizure hx on keppra so coordinator to assist him with obtaining appt.      Reports back pain (primarily upper back pain) for the past 3-4 days. He reports that it started 2 days after stopping pirmacor and he feels it may be related. He has also been sleeping in the couch and identifies that it may also be related to the back pain. He denies lifting anything heavy or any falls /trauma. No red flags including incontinence of stool or urine. No numbness in the groin area or weakness in the legs or upper extremity reported. He reports he  was not able to go for appointment because of transport issue that has now been resolved. He denies orthopnea, PND, weight gain, leg swelling. He says that he does have some shortness of breath after walking long distances which has been ongoing for months now and has not changed lately. He has lost some weight over last few months since his but weight has been stable over the last month. He denied headache, constipation, diarrhea, SOB, cough, palpitations or any additional symptoms.     * LVAD (left ventricular assist device) present  -HeartMate 3 Implanted  6/29/2022  as DT  -HTS Primary  -cont coumadin, Goal INR 2.0-3.0.   Lab Results   Component Value Date    INR 2.4 (H) 09/01/2024    INR 2.5 (H) 08/31/2024    INR 2.9 (H) 08/30/2024       -Antiplatelets ASA 81 mg  -LDH is stable overall today. Will continue to monitor daily.  -Speed set at 5100, LSL 4700 rpm  -Interrogation notable for  power cable disconnected, low voltage advisory, PI events  -Not listed for OHTx- advised pt he would not be candidate for OHTx (noncompliance/poor f/up)   -UDS negative  -attempted to call partner Robyn, unfortunately goes to        Procedure: Device Interrogation Including analysis of device parameters  Current Settings: Ventricular Assist Device  Review of device function is stable/unstable stable        9/1/2024    11:26 AM 9/1/2024     9:37 AM 9/1/2024     5:03 AM 9/1/2024    12:30 AM 8/31/2024     9:02 PM 8/31/2024     4:16 PM 8/31/2024    12:01 PM   TXP LVAD INTERROGATIONS   Type HeartMate3 HeartMate3 HeartMate3 HeartMate3 HeartMate3 HeartMate3 HeartMate3   Flow 3.8 4.1 4.2 4 3.7 4.5 4   Speed 5100 5100 5100 5100 5100 5100 5100   PI 6.2 5.5 5.2 5.9 6.6 3.1 6.1   Power (Serna) 3.6 3.6 3.6 3.5 3.6 3.5 3.6   LSL 4700 4700 4700 4700   4700   Pulsatility   Pulse Pulse Pulse           Severe sepsis  -CT chest/abdomen and pelvis w/ contrast w/ pyelonephritis and renal abscess   -repeat CT spine with contrast +visualization of  kidney read is stable, without worsening pyelo or abscess, no evidence of discitis  -WBC downtrending/normalized, likely a reaction to ovn episode of tachycardia.   -f/up cultures- +ve for staph, neg MRSA pcr, yeast now growing in blood as well  -ID following-initiating cefazolin, added micafungin  -c/s to Optho for eval of endopthalmitis in setting of yeast in blood   -removed PICC on 8/28  -driveline infection vs pyelo     CHF (NYHA class IV, ACC/AHA stage D)    Updated 8/26/24:  EF 5-10% global hypokinesis, RV enlargement and global hypokinesis, biatrial enlargement, mild-to-moderate MR, severe TR, no van HeartMate 3 in place, aortic valve does not open, LVEDd 7.2  Previous TTE 9/5/23 with EF 10-15%, mod to sever TR, LVEDd 7.11, LVAD - HM3 in place    -continue on bumex 2mg BID, cont inpatient treatment for bacteremia and fungemia   -GDMT: d/c spironolactone given possible gynecomastia, transition to epleronone   -Previously on home Primacor 0.25 at home but ran out approx 5 days prior to admission and also reports he occasionally disconnects himself from his pump to perform certain activities. Will plan to d/c off primacor   - TTE With LVEF 5-10%, poor RV function, dilation, no signs of endocarditis on TTE   -palliative care c/s and pt discussed with service, appreciate f/up- plan for continued follow up with palliative outpatient as well with Dr. Carr   -repeated [facility] discussion myself with pt on 8/31- full code, aggressive care, pt need for compliance going forward re-iterated. Pt understanding, apologetic for previous noncompliance. Pt understands he is not a candidate for heart transplant and will not be evaluated due to his noncompliance.       Pyelonephritis  Seen on ct with contrast. ID following.   -cont abx per ID   -no need for percutaneous drain per IR at this time unless pt decompensates  -current bacteremia/yeast not from renal source     Atrial flutter  pt previously went into atach vs aflutter. Pt  was started on amiodarone, with rhythm aborted to NSR following bolus. BP remained stable.   -BMP, replace lytes as needed   -cont amiodarone- will complete load and keep on maintenance dosing thereafter  -pt does report intermittently at home having episodes of palpitations that may also represent paroxysmal afib vs flutter at home. Already on a/c. Chadvasc of 5          Subcutaneous nodule of breast  Pt with unilateral R sided gynecomastia with hard nodular feeling beneath soft subcutaneous tissue of breast. No notable skin dimpling or rash.   CT with contrast reporting gyneocomastia without fluid collection/abscess. Pulmonary nodules seen on exam (needs f/up scan in 3-6 mos), enlarged lymph nodes noted on scan as well. D/w pt need for continued f/up outpatient for cancer screening. Appears to be more reactive than malignant on most recent scan.  -transition from spironolactone to epleronone     Supratherapeutic INR  -goal INR 2-3 , INR 4.7 on admission  - pharmacy  to manage coumadin dosing, cont warfarin   Check INR daily     Lab Results   Component Value Date    INR 2.4 (H) 09/01/2024    INR 2.5 (H) 08/31/2024    INR 2.9 (H) 08/30/2024         Bilateral back pain  Pt reports initial pain following running out of primacor. Does have intermittent chest pain/pressure with straining on the toilet as well but no radiation of pain or issues with nausea/vomiting/diaphoresis/sob with symptoms. S/p spinal Xrays without significant abnormality besides degenerative disc disease.  NO red flag symptoms on admission  C/w home gabapentin, tylenol prn and lidocaine patches. Prn oxycodone added as well- Only ordered one dose at most daily due to hx with drug use/pt appears comfortable in bed with tylenol, though he does express pain reaction with movement.   Back pain more pronounced on R side, most likely cause is his pyelonephritis         Pulmonary nodules  Multiple pulmonary nodules seen on CT scan with contrast. Pt does  have hx tobacco use. Lymph node enlargement noted as well. Pt has weight loss but not drastic  -need f/up scan in 3-6 months to assess stability of nodules     Polysubstance abuse  Pt has history of polysubstance use.   -UDS negative  -prn oxycodone for pyelonephritis pain x1 per day max given hx substance use       Hypokalemia  2.7 on admission  Replace and monitor      Type 2 diabetes mellitus with hyperglycemia, with long-term current use of insulin  A1C 10.3  Endocrinology consulted for glucose management, largely uncontrolled       Lab Results   Component Value Date    HGBA1C 10.3 (H) 08/25/2024    HGBA1C >14.0 (H) 04/26/2024    HGBA1C >14.0 (H) 02/17/2024    [id number] 7.5 06/12/2023    [id number] 8.5 02/07/2023           Moderate malnutrition  -pt with aggressive care goals  -c/s nutrition, appreciate assistance     Seizure-like activity  C/w home keppra 1.5 gm BID        Alice Ayala MD  Heart Transplant  [facility] - Cardiology Stepdown

## 2024-09-01 NOTE — ASSESSMENT & PLAN NOTE
BG goal 140-180  Noncompliant T2DM.     Given hypoglycemia noted x 1 yesterday evening. Novolog given with dinner despite BG < 100.     Plan:   - Decrease Lantus (Insulin Glargine) to 20 units BID (20% dose decrease)  - Decrease Novolog (Insulin Aspart) to 12 units TIDWM.(20% dose reduction; continued hypoglycemia noted) HOLD if BG < 100  - Continue moderate dose correction (150/25)  - BG checks AC/HS  - Hypoglycemia protocol in place    ** Please notify Endocrine for any change and/or advance in diet**  ** Please call Endocrine for any BG related issues **    Discharge Planning:   TBD. Please notify endocrinology prior to discharge.  Patient requires glucometer and testing supplies.  Recommend he resumes his William for blood sugar monitoring.

## 2024-09-01 NOTE — PROGRESS NOTES
08/31/2024  Mirela Perez    Current provider:  Dalia Crum MD    Device interrogation:      8/31/2024     4:16 PM 8/31/2024    12:01 PM 8/31/2024     9:19 AM 8/31/2024     4:51 AM 8/30/2024    11:30 PM 8/30/2024     9:00 PM 8/30/2024     4:41 PM   TXP LVAD INTERROGATIONS   Type HeartMate3 HeartMate3 HeartMate3 HeartMate3 HeartMate3 HeartMate3 HeartMate3   Flow 4.5 4 4.1 3.5 3.9 4.2 4.1   Speed 5100 5100 5100 5100 5100 5100 5100   PI 3.1 6.1 5.6 7.3 6.2 5.3 5.4   Power (Serna) 3.5 3.6 3.6 3.6 3.6 3.6 3.6   LSL  4700 4700    4700   Pulsatility    Pulse Intermittent pulse No Pulse Intermittent pulse          Rounded on Kevan Queen to ensure all mechanical assist device settings (IABP or VAD) were appropriate and all parameters were within limits.  I was able to ensure all back up equipment was present, the staff had no issues, and the Perfusion Department daily rounding was complete.      For implantable VADs: Interrogation of Ventricular assist device was performed with analysis of device parameters and review of device function. I have personally reviewed the interrogation findings and agree with findings as stated.     In emergency, the nursing units have been notified to contact the perfusion department either by:  Calling [phone] from 630am to 4pm Mon thru Fri, utilizing the On-Call Finder functionality of Epic and searching for Perfusion, or by contacting the hospital  from 4pm to 630am and on weekends and asking to speak with the perfusionist on call.    7:12 PM

## 2024-09-01 NOTE — PLAN OF CARE
Pt free of falls and injury. Pt AAOx4 with VSS. Fall precautions remain in place. Pt remains on room air. Sats %. NSR on telemetry with LVAD artifact. LVAD hum present and smooth. VAD numbers and dopplers WDL. DLES dressing CDI. Plan of care reviewed with pt. Intake and output monitored. Continued IV antifugal and antibiotics. Lab notified this nurse with anaerobic blood cultures from 8/30 resulting positive for yeast. MD notified. Blood sugars checked. See previous note. Patient complained of excruciating pain in lower back. PRN medication given. Patient refused AM daily weight because of severe pain. Pt denies chest pain, headache, and SOB. No acute distress noted,  plan of care continues.      Problem: Adult Inpatient Plan of Care  Goal: Plan of Care Review  Outcome: Progressing  Goal: Patient-Specific Goal (Individualized)  Outcome: Progressing  Goal: Absence of Hospital-Acquired Illness or Injury  Outcome: Progressing  Goal: Optimal Comfort and Wellbeing  Outcome: Progressing  Goal: Readiness for Transition of Care  Outcome: Progressing     Problem: Diabetes Comorbidity  Goal: Blood Glucose Level Within Targeted Range  Outcome: Progressing     Problem: Infection  Goal: Absence of Infection Signs and Symptoms  Outcome: Progressing     Problem: Ventricular Assist Device  Goal: Optimal Adjustment to Device  Outcome: Progressing  Goal: Absence of Bleeding  Outcome: Progressing  Goal: Absence of Embolism Signs and Symptoms  Outcome: Progressing  Goal: Optimal Blood Flow  Outcome: Progressing  Goal: Absence of Infection Signs and Symptoms  Outcome: Progressing  Goal: Effective Right-Sided Heart Function  Outcome: Progressing     Problem: Ventricular Assist Device  Goal: Optimal Adjustment to Device  Outcome: Progressing  Goal: Absence of Bleeding  Outcome: Progressing  Goal: Absence of Embolism Signs and Symptoms  Outcome: Progressing  Goal: Optimal Blood Flow  Outcome: Progressing  Goal: Absence of Infection  Signs and Symptoms  Outcome: Progressing  Goal: Effective Right-Sided Heart Function  Outcome: Progressing     Problem: Coping Ineffective  Goal: Effective Coping  Outcome: Progressing     Problem: Fall Injury Risk  Goal: Absence of Fall and Fall-Related Injury  Outcome: Progressing     Problem: Sepsis/Septic Shock  Goal: Optimal Coping  Outcome: Progressing  Goal: Absence of Bleeding  Outcome: Progressing  Goal: Blood Glucose Level Within Targeted Range  Outcome: Progressing  Goal: Absence of Infection Signs and Symptoms  Outcome: Progressing  Goal: Optimal Nutrition Intake  Outcome: Progressing

## 2024-09-01 NOTE — ASSESSMENT & PLAN NOTE
MSSA DLESI  Bacteremia  Pyelnephritis/renal abscess  Fungemia    I independently reviewed patient's lab work and images as documented. 40 yo male with LVAD HM3 as DT, prior MSSA DLESI/bacteremia c/b empyema (on suppressive cefadroxil) and eroded ICD/pocket infection s/p removal, seizure admitted for back pain. Hospital course notable for blcx with micrococcus (suspect contaminant), MSSA bacteremia (suspect due to DLES), and fungemia (suspect 2/2 to prior picc). DLES also with MSSA.  CT with contrast now with irregular nonenhancing renal lesion c/f pyelonephritis or potential abscess, R chest lesion c/f gynecomastia. Old PICC pulled 8/29. TTE without IE. S/p ophthalmology evaluation, no evidence of endophthalmitis. CT spine without evidence of OM/discitis.     Recommendations:  -cefazolin 2g q8hr IV   -micafungin 100 mg iv daily  -blood cx repeated this morning  -follow up cx data

## 2024-09-01 NOTE — SUBJECTIVE & OBJECTIVE
Interval History: No fevers documented overnight.   Still with back/flank pain. CT spine without evidence of OM.     Review of Systems   Constitutional:  Negative for chills and fever.   Eyes:  Negative for visual disturbance.   Genitourinary:  Positive for flank pain. Negative for difficulty urinating and dysuria.   Musculoskeletal:  Positive for back pain.   All other systems reviewed and are negative.    Objective:     Vital Signs (Most Recent):  Temp: 98.3 °F (36.8 °C) (09/01/24 1117)  Pulse: 87 (09/01/24 1117)  Resp: 18 (09/01/24 1117)  BP: (!) 82/0 (09/01/24 1117)  SpO2: 98 % (09/01/24 1117) Vital Signs (24h Range):  Temp:  [97.7 °F (36.5 °C)-98.9 °F (37.2 °C)] 98.3 °F (36.8 °C)  Pulse:  [78-95] 87  Resp:  [18] 18  SpO2:  [97 %-100 %] 98 %  BP: ()/(0-89) 82/0     Weight:  (pt .mary able to stand due to back pain.and bed scale is not working. gali)  Body mass index is 18.88 kg/m².    Estimated Creatinine Clearance: 73.9 mL/min (based on SCr of 1.3 mg/dL).     Physical Exam  Constitutional:       General: He is not in acute distress.     Appearance: He is not ill-appearing or toxic-appearing.   Eyes:      General:         Right eye: No discharge.         Left eye: No discharge.   Pulmonary:      Effort: Pulmonary effort is normal. No respiratory distress.   Abdominal:      General: There is no distension.      Palpations: Abdomen is soft.      Tenderness: There is right CVA tenderness and left CVA tenderness.      Comments: LVAD dressed   Musculoskeletal:         General: Tenderness (lower back pain) present.   Skin:     General: Skin is warm and dry.   Neurological:      Mental Status: He is alert and oriented to person, place, and time.          Significant Labs:   Microbiology Results (last 7 days)       Procedure Component Value Units Date/Time    Blood culture [[id number]] Collected: 09/01/24 0939    Order Status: Sent Specimen: Blood Updated: 09/01/24 1017    Blood culture [[id number]] Collected:  09/01/24 0939    Order Status: Sent Specimen: Blood Updated: 09/01/24 1017    Blood culture [[id number]]  (Abnormal) Collected: 08/27/24 1751    Order Status: Completed Specimen: Blood Updated: 09/01/24 0951     Blood Culture, Routine Gram stain dudley bottle: Gram positive cocci in clusters resembling Staph      Results called to and read back by: Lorri Riggs RN  08/29/2024   13:50      Gram stain aer bottle: yeast      Results called to and read back by:Lorri Riggs RN 08/30/2024      STAPHYLOCOCCUS AUREUS  ID consult required at [facility],[place] and [facility] locations.  For susceptibility see order #[id number]      Narrative:      Blood culture # 2 different location.    Blood culture [[id number]] Collected: 08/28/24 2226    Order Status: Completed Specimen: Blood Updated: 09/01/24 0947     Blood Culture, Routine Gram stain aer bottle: yeast      Gram stain dudley bottle: yeast      Positive results previously called 08/30/2024    Narrative:      Code 13415  Draw sample # 2 from separate site    Blood culture [[id number]] Collected: 08/28/24 2226    Order Status: Completed Specimen: Blood Updated: 09/01/24 0946     Blood Culture, Routine Gram stain aer bottle: yeast      Results called to and read back by:Lorri Riggs RN 08/30/2024  17:10    Narrative:      Code 67230  Draw sample # 2 from separate site    Blood culture [[id number]] Collected: 08/30/24 1152    Order Status: Completed Specimen: Blood Updated: 09/01/24 0206     Blood Culture, Routine Gram stain dudley bottle: yeast      Results called to and read back by: Rufino 09/01/2024  02:03    Narrative:      Lft AC    Blood culture [[id number]] Collected: 08/30/24 1152    Order Status: Completed Specimen: Blood Updated: 08/31/24 1412     Blood Culture, Routine No Growth to date      No Growth to date    Narrative:      Rt AC    Blood culture [[id number]]  (Abnormal)  (Susceptibility) Collected: 08/27/24 1802    Order Status: Completed Specimen: Blood Updated:  08/31/24 1049     Blood Culture, Routine Gram stain aer bottle: Gram positive cocci in clusters resembling Staph      Results called to and read back by:Miguelina Thomas RN 08/28/2024  22:59      MICROCOCCUS SPECIES  Organism is a probable contaminant        STAPHYLOCOCCUS AUREUS  ID consult required at [facility],[place] and [facility] locations.      Rapid Organism ID by PCR (from Blood culture) [[id number]] Collected: 08/28/24 2226    Order Status: Completed Updated: 08/30/24 1823     Enterococcus faecalis Not Detected     Enterococcus faecium Not Detected     Listeria monocytogenes Not Detected     Staphylococcus spp. Not Detected     Staphylococcus aureus Not Detected     Staphylococcus epidermidis Not Detected     Staphylococcus lugdunensis Not Detected     Streptococcus species Not Detected     Streptococcus agalactiae Not Detected     Streptococcus pneumoniae Not Detected     Streptococcus pyogenes Not Detected     Acinetobacter calcoaceticus/baumannii complex Not Detected     Bacteroides fragilis Not Detected     Enterobacterales Not Detected     Enterobacter cloacae complex Not Detected     Escherichia coli Not Detected     Klebsiella aerogenes Not Detected     Klebsiella oxytoca Not Detected     Klebsiella pneumoniae group Not Detected     Proteus Not Detected     Salmonella sp Not Detected     Serratia marcescens Not Detected     Haemophilus influenzae Not Detected     Neisseria meningtidis Not Detected     Pseudomonas aeruginosa Not Detected     Stenotrophomonas maltophilia Not Detected     Candida albicans Not Detected     Candida auris Not Detected     Candida glabrata Not Detected     Candida krusei Not Detected     Candida parapsilosis Not Detected     Candida tropicalis Not Detected     Cryptococcus neoformans/gattii Not Detected     CTX-M (ESBL ) Test Not Applicable     IMP (Carbapenem resistant) Test Not Applicable     KPC resistance gene (Carbapenem resistant) Test Not Applicable     mcr-1   Test Not Applicable     mec A/C  Test Not Applicable     mec A/C and MREJ (MRSA) gene Test Not Applicable     NDM (Carbapenem resistant) Test Not Applicable     OXA-48-like (Carbapenem resistant) Test Not Applicable     van A/B (VRE gene) Test Not Applicable     VIM (Carbapenem resistant) Test Not Applicable    Narrative:      Code 46278  Draw sample # 2 from separate site    Culture, Anaerobe [[id number]] Collected: 08/25/24 1733    Order Status: Completed Specimen: Wound from Abdomen Updated: 08/30/24 1405     Anaerobic Culture No anaerobes isolated    Narrative:      Drive line exit site    Aerobic culture [[id number]]  (Abnormal)  (Susceptibility) Collected: 08/27/24 1446    Order Status: Completed Specimen: Skin from Abdomen Updated: 08/29/24 1424     Aerobic Bacterial Culture STAPHYLOCOCCUS AUREUS  Rare      Narrative:      Driveline site    Culture, Anaerobe [[id number]] Collected: 08/27/24 1446    Order Status: Completed Specimen: Skin from Abdomen Updated: 08/29/24 0946     Anaerobic Culture Culture in progress    Narrative:      Driveline site    MRSA/SA Rapid ID by PCR from Blood culture [[id number]]  (Abnormal) Collected: 08/27/24 1802    Order Status: Completed Updated: 08/29/24 0027     Staph aureus ID by PCR Positive     Methicillin Resistant ID by PCR Negative    Blood culture [[id number]]     Order Status: Canceled Specimen: Blood     Blood culture [[id number]]     Order Status: Canceled Specimen: Blood     Aerobic culture [[id number]] Collected: 08/25/24 1733    Order Status: Sent Specimen: Wound from Abdomen Updated: 08/25/24 1734            Significant Imaging: I have reviewed all pertinent imaging results/findings within the past 24 hours.

## 2024-09-01 NOTE — ASSESSMENT & PLAN NOTE
-CT chest/abdomen and pelvis w/ contrast w/ pyelonephritis and renal abscess   -repeat CT spine with contrast +visualization of kidney read is stable, without worsening pyelo or abscess, no evidence of discitis  -WBC downtrending/normalized, likely a reaction to ovn episode of tachycardia.   -f/up cultures- +ve for staph, neg MRSA pcr, yeast now growing in blood as well  -ID following-initiating cefazolin, added micafungin  -c/s to Optho for eval of endopthalmitis in setting of yeast in blood   -removed PICC on 8/28  -driveline infection vs pyelo    no

## 2024-09-01 NOTE — ASSESSMENT & PLAN NOTE
Pt reports initial pain following running out of primacor. Does have intermittent chest pain/pressure with straining on the toilet as well but no radiation of pain or issues with nausea/vomiting/diaphoresis/sob with symptoms. S/p spinal Xrays without significant abnormality besides degenerative disc disease.  NO red flag symptoms on admission  C/w home gabapentin, tylenol prn and lidocaine patches. Prn oxycodone added as well- Only ordered one dose at most daily due to hx with drug use/pt appears comfortable in bed with tylenol, though he does express pain reaction with movement.   Back pain more pronounced on R side, most likely cause is his pyelonephritis

## 2024-09-01 NOTE — PROGRESS NOTES
[facility] - Cardiology Stepdown  Infectious Disease  Progress Note    Patient Name: Kevan Queen  MRN: [id number]  Admission Date: 8/25/2024  Length of Stay: 7 days  Attending Physician: Dalia Crum MD  Primary Care Provider: Rosio Armendariz FNP    Isolation Status: No active isolations  Assessment/Plan:      Cardiac/Vascular  * LVAD (left ventricular assist device) present  MSSA DLESI  Bacteremia  Pyelnephritis/renal abscess  Fungemia    I independently reviewed patient's lab work and images as documented. 38 yo male with LVAD HM3 as DT, prior MSSA DLESI/bacteremia c/b empyema (on suppressive cefadroxil) and eroded ICD/pocket infection s/p removal, seizure admitted for back pain. Hospital course notable for blcx with micrococcus (suspect contaminant), MSSA bacteremia (suspect due to DLES), and fungemia (suspect 2/2 to prior picc). DLES also with MSSA.  CT with contrast now with irregular nonenhancing renal lesion c/f pyelonephritis or potential abscess, R chest lesion c/f gynecomastia. Old PICC pulled 8/29. TTE without IE. S/p ophthalmology evaluation, no evidence of endophthalmitis. CT spine without evidence of OM/discitis. Blcx positive with yeast on 8/30.    Recommendations:  -cefazolin 2g q8hr IV for MSSA bacteremia/DLESI  -micafungin 100 mg iv daily pending ID  -blood cx repeated this morning  -follow up cx data                Thank you for your consult. ID will follow-up with patient. Please contact us if you have any additional questions.    Laura Baldwin MD  Infectious Disease  [facility] - [facility]iology Stepdown    Subjective:     Principal Problem:LVAD (left ventricular assist device) present    HPI: 38 yo male with LVAD HM3 as DT, prior MSSA DLESI/bacteremia c/b empyema (on suppressive cefadroxil) and eroded ICD/pocket infection s/p removal, seizure admitted for back pain. ID consulted for abx recs. Hospital course notable for US of chest with hypoechoic area of unclear significance. DLES  cx in process. Pt reported adherence to cefadroxil - denied issues with it. Denied fevers, chills, abdominal complaints or worsening drainage from DLES. Pt reported that he ran out of his primacor and had some back pain soon after. Denied problems with PICC. Reported swelling under R breast - pt states he is unclear how long he's had the swelling.            Interval History: No fevers documented overnight.   Still with back/flank pain. CT spine without evidence of OM.     Review of Systems   Constitutional:  Negative for chills and fever.   Eyes:  Negative for visual disturbance.   Genitourinary:  Positive for flank pain. Negative for difficulty urinating and dysuria.   Musculoskeletal:  Positive for back pain.   All other systems reviewed and are negative.    Objective:     Vital Signs (Most Recent):  Temp: 98.3 °F (36.8 °C) (09/01/24 1117)  Pulse: 87 (09/01/24 1117)  Resp: 18 (09/01/24 1117)  BP: (!) 82/0 (09/01/24 1117)  SpO2: 98 % (09/01/24 1117) Vital Signs (24h Range):  Temp:  [97.7 °F (36.5 °C)-98.9 °F (37.2 °C)] 98.3 °F (36.8 °C)  Pulse:  [78-95] 87  Resp:  [18] 18  SpO2:  [97 %-100 %] 98 %  BP: ()/(0-89) 82/0     Weight:  (pt .mary able to stand due to back pain.and bed scale is not working. gali)  Body mass index is 18.88 kg/m².    Estimated Creatinine Clearance: 73.9 mL/min (based on SCr of 1.3 mg/dL).     Physical Exam  Constitutional:       General: He is not in acute distress.     Appearance: He is not ill-appearing or toxic-appearing.   Eyes:      General:         Right eye: No discharge.         Left eye: No discharge.   Pulmonary:      Effort: Pulmonary effort is normal. No respiratory distress.   Abdominal:      General: There is no distension.      Palpations: Abdomen is soft.      Tenderness: There is right CVA tenderness and left CVA tenderness.      Comments: LVAD dressed   Musculoskeletal:         General: Tenderness (lower back pain) present.   Skin:     General: Skin is warm and  dry.   Neurological:      Mental Status: He is alert and oriented to person, place, and time.          Significant Labs:   Microbiology Results (last 7 days)       Procedure Component Value Units Date/Time    Blood culture [[id number]] Collected: 09/01/24 0939    Order Status: Sent Specimen: Blood Updated: 09/01/24 1017    Blood culture [[id number]] Collected: 09/01/24 0939    Order Status: Sent Specimen: Blood Updated: 09/01/24 1017    Blood culture [[id number]]  (Abnormal) Collected: 08/27/24 1751    Order Status: Completed Specimen: Blood Updated: 09/01/24 0951     Blood Culture, Routine Gram stain dudley bottle: Gram positive cocci in clusters resembling Staph      Results called to and read back by: Lorri Riggs RN  08/29/2024   13:50      Gram stain aer bottle: yeast      Results called to and read back by:Lorri Riggs RN 08/30/2024      STAPHYLOCOCCUS AUREUS  ID consult required at [facility],[place] and [facility] locations.  For susceptibility see order #[id number]      Narrative:      Blood culture # 2 different location.    Blood culture [[id number]] Collected: 08/28/24 2226    Order Status: Completed Specimen: Blood Updated: 09/01/24 0947     Blood Culture, Routine Gram stain aer bottle: yeast      Gram stain dudley bottle: yeast      Positive results previously called 08/30/2024    Narrative:      Code 92311  Draw sample # 2 from separate site    Blood culture [[id number]] Collected: 08/28/24 2226    Order Status: Completed Specimen: Blood Updated: 09/01/24 0946     Blood Culture, Routine Gram stain aer bottle: yeast      Results called to and read back by:Lorri Riggs RN 08/30/2024  17:10    Narrative:      Code 71065  Draw sample # 2 from separate site    Blood culture [[id number]] Collected: 08/30/24 1152    Order Status: Completed Specimen: Blood Updated: 09/01/24 0206     Blood Culture, Routine Gram stain dudley bottle: yeast      Results called to and read back by: Rufino 09/01/2024  02:03    Narrative:       Lft AC    Blood culture [[id number]] Collected: 08/30/24 1152    Order Status: Completed Specimen: Blood Updated: 08/31/24 1412     Blood Culture, Routine No Growth to date      No Growth to date    Narrative:      Rt AC    Blood culture [[id number]]  (Abnormal)  (Susceptibility) Collected: 08/27/24 1802    Order Status: Completed Specimen: Blood Updated: 08/31/24 1049     Blood Culture, Routine Gram stain aer bottle: Gram positive cocci in clusters resembling Staph      Results called to and read back by:Miguelina Thomas RN 08/28/2024  22:59      MICROCOCCUS SPECIES  Organism is a probable contaminant        STAPHYLOCOCCUS AUREUS  ID consult required at [facility],[place] [facility].      Rapid Organism ID by PCR (from Blood culture) [[id number]] Collected: 08/28/24 2226    Order Status: Completed Updated: 08/30/24 1823     Enterococcus faecalis Not Detected     Enterococcus faecium Not Detected     Listeria monocytogenes Not Detected     Staphylococcus spp. Not Detected     Staphylococcus aureus Not Detected     Staphylococcus epidermidis Not Detected     Staphylococcus lugdunensis Not Detected     Streptococcus species Not Detected     Streptococcus agalactiae Not Detected     Streptococcus pneumoniae Not Detected     Streptococcus pyogenes Not Detected     Acinetobacter calcoaceticus/baumannii complex Not Detected     Bacteroides fragilis Not Detected     Enterobacterales Not Detected     Enterobacter cloacae complex Not Detected     Escherichia coli Not Detected     Klebsiella aerogenes Not Detected     Klebsiella oxytoca Not Detected     Klebsiella pneumoniae group Not Detected     Proteus Not Detected     Salmonella sp Not Detected     Serratia marcescens Not Detected     Haemophilus influenzae Not Detected     Neisseria meningtidis Not Detected     Pseudomonas aeruginosa Not Detected     Stenotrophomonas maltophilia Not Detected     Candida albicans Not Detected     Candida auris Not Detected      Tg glabrata Not Detected     Candida krusei Not Detected     Candida parapsilosis Not Detected     Candida tropicalis Not Detected     Cryptococcus neoformans/gattii Not Detected     CTX-M (ESBL ) Test Not Applicable     IMP (Carbapenem resistant) Test Not Applicable     KPC resistance gene (Carbapenem resistant) Test Not Applicable     mcr-1  Test Not Applicable     mec A/C  Test Not Applicable     mec A/C and MREJ (MRSA) gene Test Not Applicable     NDM (Carbapenem resistant) Test Not Applicable     OXA-48-like (Carbapenem resistant) Test Not Applicable     van A/B (VRE gene) Test Not Applicable     VIM (Carbapenem resistant) Test Not Applicable    Narrative:      Code 96450  Draw sample # 2 from separate site    Culture, Anaerobe [[id number]] Collected: 08/25/24 1733    Order Status: Completed Specimen: Wound from Abdomen Updated: 08/30/24 1405     Anaerobic Culture No anaerobes isolated    Narrative:      Drive line exit site    Aerobic culture [[id number]]  (Abnormal)  (Susceptibility) Collected: 08/27/24 1446    Order Status: Completed Specimen: Skin from Abdomen Updated: 08/29/24 1424     Aerobic Bacterial Culture STAPHYLOCOCCUS AUREUS  Rare      Narrative:      Driveline site    Culture, Anaerobe [[id number]] Collected: 08/27/24 1446    Order Status: Completed Specimen: Skin from Abdomen Updated: 08/29/24 0946     Anaerobic Culture Culture in progress    Narrative:      Driveline site    MRSA/SA Rapid ID by PCR from Blood culture [[id number]]  (Abnormal) Collected: 08/27/24 1802    Order Status: Completed Updated: 08/29/24 0027     Staph aureus ID by PCR Positive     Methicillin Resistant ID by PCR Negative    Blood culture [[id number]]     Order Status: Canceled Specimen: Blood     Blood culture [[id number]]     Order Status: Canceled Specimen: Blood     Aerobic culture [[id number]] Collected: 08/25/24 1733    Order Status: Sent Specimen: Wound from Abdomen Updated: 08/25/24 1734             Significant Imaging: I have reviewed all pertinent imaging results/findings within the past 24 hours.

## 2024-09-01 NOTE — SUBJECTIVE & OBJECTIVE
"Interval HPI:   Overnight events: Remains in CSU. BG variable on current SQ insulin regimen. Hypoglycemia noted yesterday afternoon. Diet diabetic Cardiac (Low Na/Chol); 2000 Calorie; Fluid - 1500mL; Standard Tray    Eatin%  Nausea: No  Hypoglycemia and intervention: Yes, BG 64 at 1856.   Fever: No  TPN and/or TF: No  If yes, type of TF/TPN and rate: n/a    BP (!) 86/0 (BP Location: Left arm, Patient Position: Sitting)   Pulse 83   Temp 98.5 °F (36.9 °C) (Oral)   Resp 18   Ht 6' 3" (1.905 m)   Wt 68.5 kg (151 lb 0.2 oz)   SpO2 99%   BMI 18.88 kg/m²     Labs Reviewed and Include    Recent Labs   Lab 24  0234   *   CALCIUM 9.3   *   K 4.0   CO2 24   CL 93*   BUN 21*   CREATININE 1.3     Lab Results   Component Value Date    WBC 12.27 2024    HGB 7.8 (L) 2024    HCT 26.8 (L) 2024    MCV 67 (L) 2024     (H) 2024     Recent Labs   Lab 24  1058   TSH 2.009     Lab Results   Component Value Date    HGBA1C 10.3 (H) 2024       Nutritional status:   Body mass index is 18.88 kg/m².  Lab Results   Component Value Date    ALBUMIN 2.5 (L) 2024    ALBUMIN 2.4 (L) 2024    ALBUMIN 2.2 (L) 2024     Lab Results   Component Value Date    PREALBUMIN 9 (L) 2024    PREALBUMIN 8 (L) 2024    PREALBUMIN 7 (L) 2024       Estimated Creatinine Clearance: 73.9 mL/min (based on SCr of 1.3 mg/dL).    Accu-Checks  Recent Labs     24  0640 24  0915 24  1138 24  1640 24  1856 24  2041 24  0024 24  0450 24  0558 24  0754   POCTGLUCOSE 214* 290* 229* 87 64* 90 225* 165* 187* 214*       Current Medications and/or Treatments Impacting Glycemic Control  Immunotherapy:    Immunosuppressants       None          Steroids:   Hormones (From admission, onward)      None          Pressors:    Autonomic Drugs (From admission, onward)      None          Hyperglycemia/Diabetes Medications: "   Antihyperglycemics (From admission, onward)      Start     Stop Route Frequency Ordered    09/01/24 1130  insulin aspart U-100 pen 12 Units         -- SubQ 3 times daily with meals 09/01/24 0845    08/31/24 0900  insulin glargine U-100 (Lantus) pen 24 Units         -- SubQ 2 times daily 08/31/24 0827    08/25/24 1506  insulin aspart U-100 pen 0-10 Units         -- SubQ Before meals & nightly PRN 08/25/24 1407

## 2024-09-01 NOTE — PLAN OF CARE
Plan of care reviewed with patient. Patient is AOX4 and VS stable. Patient remained free of falls and trauma, fall precautions are in place. Patient ambulating. Patient has no questions at this time. Wheels are locked and the bed is in lowest position. The call bell is within reach. Will continue to follow care.       Problem: Diabetes Comorbidity  Goal: Blood Glucose Level Within Targeted Range  Outcome: Progressing     Problem: Infection  Goal: Absence of Infection Signs and Symptoms  Outcome: Progressing     Problem: Adult Inpatient Plan of Care  Goal: Plan of Care Review  Outcome: Progressing  Goal: Patient-Specific Goal (Individualized)  Outcome: Progressing  Goal: Absence of Hospital-Acquired Illness or Injury  Outcome: Progressing  Goal: Optimal Comfort and Wellbeing  Outcome: Progressing

## 2024-09-01 NOTE — ASSESSMENT & PLAN NOTE
-goal INR 2-3 , INR 4.7 on admission  - pharmacy  to manage coumadin dosing, cont warfarin   Check INR daily     Lab Results   Component Value Date    INR 2.4 (H) 09/01/2024    INR 2.5 (H) 08/31/2024    INR 2.9 (H) 08/30/2024

## 2024-09-01 NOTE — NURSING
Patient's blood sugar 64 at 1900. Patient stated he was asymptomatic and eating a meal. Recheck at 2030 was 90 after eating. Patient remained asymptomatic. MD notified and with an order to hold PM dose of Lantus and to recheck the sugar at midnight.    0000 Recheck at midnight was 225. Patient awake, alert eating snacks in bed. MD notified.  With orders to give PM lantus Plan of care continues.

## 2024-09-02 LAB
ALBUMIN SERPL BCP-MCNC: 2.6 G/DL (ref 3.5–5.2)
ALP SERPL-CCNC: 230 U/L (ref 55–135)
ALT SERPL W/O P-5'-P-CCNC: 8 U/L (ref 10–44)
ANION GAP SERPL CALC-SCNC: 11 MMOL/L (ref 8–16)
APTT PPP: 40.9 SEC (ref 21–32)
AST SERPL-CCNC: 31 U/L (ref 10–40)
BACTERIA SPEC ANAEROBE CULT: NORMAL
BASOPHILS # BLD AUTO: 0.05 K/UL (ref 0–0.2)
BASOPHILS # BLD AUTO: 0.06 K/UL (ref 0–0.2)
BASOPHILS NFR BLD: 0.4 % (ref 0–1.9)
BASOPHILS NFR BLD: 0.5 % (ref 0–1.9)
BILIRUB DIRECT SERPL-MCNC: 1.4 MG/DL (ref 0.1–0.3)
BILIRUB SERPL-MCNC: 1.9 MG/DL (ref 0.1–1)
BNP SERPL-MCNC: 1053 PG/ML (ref 0–99)
BUN SERPL-MCNC: 20 MG/DL (ref 6–20)
CALCIUM SERPL-MCNC: 9.1 MG/DL (ref 8.7–10.5)
CHLORIDE SERPL-SCNC: 93 MMOL/L (ref 95–110)
CO2 SERPL-SCNC: 26 MMOL/L (ref 23–29)
CREAT SERPL-MCNC: 1.2 MG/DL (ref 0.5–1.4)
CRP SERPL-MCNC: 32.4 MG/L (ref 0–8.2)
DIFFERENTIAL METHOD BLD: ABNORMAL
DIFFERENTIAL METHOD BLD: ABNORMAL
EOSINOPHIL # BLD AUTO: 0 K/UL (ref 0–0.5)
EOSINOPHIL # BLD AUTO: 0 K/UL (ref 0–0.5)
EOSINOPHIL NFR BLD: 0.2 % (ref 0–8)
EOSINOPHIL NFR BLD: 0.2 % (ref 0–8)
ERYTHROCYTE [DISTWIDTH] IN BLOOD BY AUTOMATED COUNT: 27.2 % (ref 11.5–14.5)
ERYTHROCYTE [DISTWIDTH] IN BLOOD BY AUTOMATED COUNT: 27.5 % (ref 11.5–14.5)
EST. GFR  (NO RACE VARIABLE): >60 ML/MIN/1.73 M^2
GLUCOSE SERPL-MCNC: 41 MG/DL (ref 70–110)
HCT VFR BLD AUTO: 23.4 % (ref 40–54)
HCT VFR BLD AUTO: 26.9 % (ref 40–54)
HGB BLD-MCNC: 6.6 G/DL (ref 14–18)
HGB BLD-MCNC: 7.8 G/DL (ref 14–18)
IMM GRANULOCYTES # BLD AUTO: 0.05 K/UL (ref 0–0.04)
IMM GRANULOCYTES # BLD AUTO: 0.06 K/UL (ref 0–0.04)
IMM GRANULOCYTES NFR BLD AUTO: 0.4 % (ref 0–0.5)
IMM GRANULOCYTES NFR BLD AUTO: 0.5 % (ref 0–0.5)
INR PPP: 2.8 (ref 0.8–1.2)
LDH SERPL L TO P-CCNC: 323 U/L (ref 110–260)
LYMPHOCYTES # BLD AUTO: 1.1 K/UL (ref 1–4.8)
LYMPHOCYTES # BLD AUTO: 1.8 K/UL (ref 1–4.8)
LYMPHOCYTES NFR BLD: 14.6 % (ref 18–48)
LYMPHOCYTES NFR BLD: 8.6 % (ref 18–48)
MAGNESIUM SERPL-MCNC: 1.9 MG/DL (ref 1.6–2.6)
MCH RBC QN AUTO: 19 PG (ref 27–31)
MCH RBC QN AUTO: 19.4 PG (ref 27–31)
MCHC RBC AUTO-ENTMCNC: 28.2 G/DL (ref 32–36)
MCHC RBC AUTO-ENTMCNC: 29 G/DL (ref 32–36)
MCV RBC AUTO: 67 FL (ref 82–98)
MCV RBC AUTO: 67 FL (ref 82–98)
MONOCYTES # BLD AUTO: 1.2 K/UL (ref 0.3–1)
MONOCYTES # BLD AUTO: 1.4 K/UL (ref 0.3–1)
MONOCYTES NFR BLD: 10.9 % (ref 4–15)
MONOCYTES NFR BLD: 9.7 % (ref 4–15)
NEUTROPHILS # BLD AUTO: 10.3 K/UL (ref 1.8–7.7)
NEUTROPHILS # BLD AUTO: 9.3 K/UL (ref 1.8–7.7)
NEUTROPHILS NFR BLD: 74.7 % (ref 38–73)
NEUTROPHILS NFR BLD: 79.3 % (ref 38–73)
NRBC BLD-RTO: 0 /100 WBC
NRBC BLD-RTO: 0 /100 WBC
PHOSPHATE SERPL-MCNC: 3.9 MG/DL (ref 2.7–4.5)
PLATELET # BLD AUTO: 537 K/UL (ref 150–450)
PLATELET # BLD AUTO: 561 K/UL (ref 150–450)
PMV BLD AUTO: 10 FL (ref 9.2–12.9)
PMV BLD AUTO: 10 FL (ref 9.2–12.9)
POCT GLUCOSE: 109 MG/DL (ref 70–110)
POCT GLUCOSE: 123 MG/DL (ref 70–110)
POCT GLUCOSE: 247 MG/DL (ref 70–110)
POCT GLUCOSE: 282 MG/DL (ref 70–110)
POCT GLUCOSE: 294 MG/DL (ref 70–110)
POCT GLUCOSE: 59 MG/DL (ref 70–110)
POCT GLUCOSE: 63 MG/DL (ref 70–110)
POCT GLUCOSE: 94 MG/DL (ref 70–110)
POTASSIUM SERPL-SCNC: 3.4 MMOL/L (ref 3.5–5.1)
PREALB SERPL-MCNC: 10 MG/DL (ref 20–43)
PROT SERPL-MCNC: 8.5 G/DL (ref 6–8.4)
PROTHROMBIN TIME: 28.7 SEC (ref 9–12.5)
RBC # BLD AUTO: 3.47 M/UL (ref 4.6–6.2)
RBC # BLD AUTO: 4.03 M/UL (ref 4.6–6.2)
SODIUM SERPL-SCNC: 130 MMOL/L (ref 136–145)
WBC # BLD AUTO: 12.46 K/UL (ref 3.9–12.7)
WBC # BLD AUTO: 12.97 K/UL (ref 3.9–12.7)

## 2024-09-02 PROCEDURE — 83880 ASSAY OF NATRIURETIC PEPTIDE: CPT | Performed by: STUDENT IN AN ORGANIZED HEALTH CARE EDUCATION/TRAINING PROGRAM

## 2024-09-02 PROCEDURE — 25000003 PHARM REV CODE 250: Performed by: STUDENT IN AN ORGANIZED HEALTH CARE EDUCATION/TRAINING PROGRAM

## 2024-09-02 PROCEDURE — 85025 COMPLETE CBC W/AUTO DIFF WBC: CPT | Mod: 91 | Performed by: INTERNAL MEDICINE

## 2024-09-02 PROCEDURE — 25000003 PHARM REV CODE 250: Performed by: INTERNAL MEDICINE

## 2024-09-02 PROCEDURE — 83615 LACTATE (LD) (LDH) ENZYME: CPT | Performed by: STUDENT IN AN ORGANIZED HEALTH CARE EDUCATION/TRAINING PROGRAM

## 2024-09-02 PROCEDURE — 27000248 HC VAD-ADDITIONAL DAY

## 2024-09-02 PROCEDURE — 83735 ASSAY OF MAGNESIUM: CPT | Performed by: STUDENT IN AN ORGANIZED HEALTH CARE EDUCATION/TRAINING PROGRAM

## 2024-09-02 PROCEDURE — 80076 HEPATIC FUNCTION PANEL: CPT | Performed by: STUDENT IN AN ORGANIZED HEALTH CARE EDUCATION/TRAINING PROGRAM

## 2024-09-02 PROCEDURE — 93750 INTERROGATION VAD IN PERSON: CPT | Mod: ,,, | Performed by: INTERNAL MEDICINE

## 2024-09-02 PROCEDURE — 20600001 HC STEP DOWN PRIVATE ROOM

## 2024-09-02 PROCEDURE — 36415 COLL VENOUS BLD VENIPUNCTURE: CPT | Performed by: INTERNAL MEDICINE

## 2024-09-02 PROCEDURE — 86140 C-REACTIVE PROTEIN: CPT | Performed by: STUDENT IN AN ORGANIZED HEALTH CARE EDUCATION/TRAINING PROGRAM

## 2024-09-02 PROCEDURE — 85610 PROTHROMBIN TIME: CPT | Performed by: STUDENT IN AN ORGANIZED HEALTH CARE EDUCATION/TRAINING PROGRAM

## 2024-09-02 PROCEDURE — 84134 ASSAY OF PREALBUMIN: CPT | Performed by: STUDENT IN AN ORGANIZED HEALTH CARE EDUCATION/TRAINING PROGRAM

## 2024-09-02 PROCEDURE — 85025 COMPLETE CBC W/AUTO DIFF WBC: CPT | Performed by: STUDENT IN AN ORGANIZED HEALTH CARE EDUCATION/TRAINING PROGRAM

## 2024-09-02 PROCEDURE — 36415 COLL VENOUS BLD VENIPUNCTURE: CPT | Performed by: STUDENT IN AN ORGANIZED HEALTH CARE EDUCATION/TRAINING PROGRAM

## 2024-09-02 PROCEDURE — 99232 SBSQ HOSP IP/OBS MODERATE 35: CPT | Mod: ,,, | Performed by: NURSE PRACTITIONER

## 2024-09-02 PROCEDURE — 25000003 PHARM REV CODE 250

## 2024-09-02 PROCEDURE — 84100 ASSAY OF PHOSPHORUS: CPT | Performed by: STUDENT IN AN ORGANIZED HEALTH CARE EDUCATION/TRAINING PROGRAM

## 2024-09-02 PROCEDURE — 63600175 PHARM REV CODE 636 W HCPCS: Performed by: STUDENT IN AN ORGANIZED HEALTH CARE EDUCATION/TRAINING PROGRAM

## 2024-09-02 PROCEDURE — 85730 THROMBOPLASTIN TIME PARTIAL: CPT | Performed by: STUDENT IN AN ORGANIZED HEALTH CARE EDUCATION/TRAINING PROGRAM

## 2024-09-02 PROCEDURE — 63600175 PHARM REV CODE 636 W HCPCS

## 2024-09-02 PROCEDURE — 99223 1ST HOSP IP/OBS HIGH 75: CPT | Mod: ,,, | Performed by: INTERNAL MEDICINE

## 2024-09-02 PROCEDURE — 99233 SBSQ HOSP IP/OBS HIGH 50: CPT | Mod: ,,, | Performed by: INTERNAL MEDICINE

## 2024-09-02 PROCEDURE — 80048 BASIC METABOLIC PNL TOTAL CA: CPT | Performed by: STUDENT IN AN ORGANIZED HEALTH CARE EDUCATION/TRAINING PROGRAM

## 2024-09-02 RX ORDER — CYCLOBENZAPRINE HCL 5 MG
5 TABLET ORAL 2 TIMES DAILY PRN
Status: DISCONTINUED | OUTPATIENT
Start: 2024-09-02 | End: 2024-09-02

## 2024-09-02 RX ORDER — INSULIN ASPART 100 [IU]/ML
10 INJECTION, SOLUTION INTRAVENOUS; SUBCUTANEOUS
Status: DISCONTINUED | OUTPATIENT
Start: 2024-09-02 | End: 2024-09-07

## 2024-09-02 RX ORDER — POTASSIUM CHLORIDE 20 MEQ/1
40 TABLET, EXTENDED RELEASE ORAL EVERY 4 HOURS
Status: COMPLETED | OUTPATIENT
Start: 2024-09-02 | End: 2024-09-02

## 2024-09-02 RX ORDER — CYCLOBENZAPRINE HCL 5 MG
5 TABLET ORAL NIGHTLY
Status: DISCONTINUED | OUTPATIENT
Start: 2024-09-02 | End: 2024-09-02

## 2024-09-02 RX ORDER — ACETAMINOPHEN 500 MG
1000 TABLET ORAL ONCE
Status: COMPLETED | OUTPATIENT
Start: 2024-09-02 | End: 2024-09-02

## 2024-09-02 RX ORDER — INSULIN GLARGINE 100 [IU]/ML
14 INJECTION, SOLUTION SUBCUTANEOUS 2 TIMES DAILY
Status: DISCONTINUED | OUTPATIENT
Start: 2024-09-02 | End: 2024-09-04

## 2024-09-02 RX ORDER — CYCLOBENZAPRINE HCL 5 MG
5 TABLET ORAL NIGHTLY
Status: DISCONTINUED | OUTPATIENT
Start: 2024-09-02 | End: 2024-09-03

## 2024-09-02 RX ADMIN — GABAPENTIN 100 MG: 100 CAPSULE ORAL at 09:09

## 2024-09-02 RX ADMIN — LEVETIRACETAM 1500 MG: 500 TABLET, FILM COATED ORAL at 09:09

## 2024-09-02 RX ADMIN — AMIODARONE HYDROCHLORIDE 400 MG: 200 TABLET ORAL at 09:09

## 2024-09-02 RX ADMIN — INSULIN GLARGINE 14 UNITS: 100 INJECTION, SOLUTION SUBCUTANEOUS at 11:09

## 2024-09-02 RX ADMIN — ACETAMINOPHEN 1000 MG: 500 TABLET ORAL at 06:09

## 2024-09-02 RX ADMIN — CEFAZOLIN 2 G: 2 INJECTION, POWDER, FOR SOLUTION INTRAMUSCULAR; INTRAVENOUS at 09:09

## 2024-09-02 RX ADMIN — DULOXETINE HYDROCHLORIDE 30 MG: 30 CAPSULE, DELAYED RELEASE ORAL at 09:09

## 2024-09-02 RX ADMIN — OXYCODONE HYDROCHLORIDE 10 MG: 10 TABLET, FILM COATED, EXTENDED RELEASE ORAL at 04:09

## 2024-09-02 RX ADMIN — BUMETANIDE 2 MG: 1 TABLET ORAL at 09:09

## 2024-09-02 RX ADMIN — Medication 400 MG: at 09:09

## 2024-09-02 RX ADMIN — CYCLOBENZAPRINE HYDROCHLORIDE 5 MG: 5 TABLET, FILM COATED ORAL at 06:09

## 2024-09-02 RX ADMIN — CEFAZOLIN 2 G: 2 INJECTION, POWDER, FOR SOLUTION INTRAMUSCULAR; INTRAVENOUS at 04:09

## 2024-09-02 RX ADMIN — MICAFUNGIN SODIUM 100 MG: 100 INJECTION, POWDER, LYOPHILIZED, FOR SOLUTION INTRAVENOUS at 05:09

## 2024-09-02 RX ADMIN — ATORVASTATIN CALCIUM 40 MG: 20 TABLET, FILM COATED ORAL at 09:09

## 2024-09-02 RX ADMIN — INSULIN ASPART 4 UNITS: 100 INJECTION, SOLUTION INTRAVENOUS; SUBCUTANEOUS at 04:09

## 2024-09-02 RX ADMIN — PANTOPRAZOLE SODIUM 40 MG: 40 TABLET, DELAYED RELEASE ORAL at 09:09

## 2024-09-02 RX ADMIN — ACETAMINOPHEN 650 MG: 325 TABLET ORAL at 09:09

## 2024-09-02 RX ADMIN — GABAPENTIN 100 MG: 100 CAPSULE ORAL at 11:09

## 2024-09-02 RX ADMIN — POTASSIUM CHLORIDE 40 MEQ: 1500 TABLET, EXTENDED RELEASE ORAL at 09:09

## 2024-09-02 RX ADMIN — AMLODIPINE BESYLATE 5 MG: 5 TABLET ORAL at 09:09

## 2024-09-02 RX ADMIN — INSULIN ASPART 6 UNITS: 100 INJECTION, SOLUTION INTRAVENOUS; SUBCUTANEOUS at 11:09

## 2024-09-02 RX ADMIN — INSULIN GLARGINE 14 UNITS: 100 INJECTION, SOLUTION SUBCUTANEOUS at 09:09

## 2024-09-02 RX ADMIN — ASPIRIN 81 MG: 81 TABLET, COATED ORAL at 09:09

## 2024-09-02 RX ADMIN — WARFARIN SODIUM 1 MG: 1 TABLET ORAL at 05:09

## 2024-09-02 RX ADMIN — EPLERENONE 25 MG: 25 TABLET, FILM COATED ORAL at 09:09

## 2024-09-02 RX ADMIN — POTASSIUM CHLORIDE 40 MEQ: 1500 TABLET, EXTENDED RELEASE ORAL at 01:09

## 2024-09-02 RX ADMIN — GABAPENTIN 400 MG: 400 CAPSULE ORAL at 09:09

## 2024-09-02 RX ADMIN — INSULIN ASPART 10 UNITS: 100 INJECTION, SOLUTION INTRAVENOUS; SUBCUTANEOUS at 04:09

## 2024-09-02 RX ADMIN — CEFAZOLIN 2 G: 2 INJECTION, POWDER, FOR SOLUTION INTRAMUSCULAR; INTRAVENOUS at 11:09

## 2024-09-02 RX ADMIN — BUMETANIDE 2 MG: 1 TABLET ORAL at 04:09

## 2024-09-02 RX ADMIN — INSULIN ASPART 10 UNITS: 100 INJECTION, SOLUTION INTRAVENOUS; SUBCUTANEOUS at 11:09

## 2024-09-02 NOTE — ASSESSMENT & PLAN NOTE
-CT chest/abdomen and pelvis w/ contrast w/ pyelonephritis and renal abscess   -repeat CT spine with contrast +visualization of kidney read is stable, without worsening pyelo or abscess, no evidence of discitis    Plan:  -F/up cultures- +ve for staph, neg MRSA pcr, yeast now growing in blood as well  -Follow cultires from 9/1 - NGTD  -ID following-c/w cefazolin, micafungin  -C/s to Optho for eval of endopthalmitis in setting of yeast in blood   -Removed PICC on 8/28  -Driveline infection vs pyelo vs PICC related  - TTE negative for endocarditis

## 2024-09-02 NOTE — PROGRESS NOTES
[facility] Step[facility]  Endocrinology  Progress Note    Admit Date: 8/25/2024     Reason for Consult: Management of T2DM, Hyperglycemia     Surgical Procedure and Date: LVAD 06/29/2022     Diabetes diagnosis year: 2022    Home Diabetes Medications:  Levemir 20 units twice daily and Novolog SSI (150/25) per patient    How often checking glucose at home?  Has not checked in a few weeks due to running out of strips    BG readings on regimen: COLLIN  Hypoglycemia on the regimen?  Yes; rarely experiences hypoglycemic symptoms such as blurry vision and vomiting. However, does not know his blood sugar level due to running out of supplies. He will self-correct with a snack.  Missed doses on regimen?  Yes, has not had insulin in a few weeks due to running out of glucometer supplies.    Diabetes Complications include:   Hyperglycemia    Complicating diabetes co morbidities:   History of MI, CHF, and CKD      HPI:   Patient is a 39 y.o. 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 He underwent RVAD removal and chest closure 6/30/2022.  He was weaned off  but restarted due to RVF. He was transitioned to milrinone (secondary to  shortage). History of unclear syncope vs seizures and followed by neuro and is on Keppra. On 11/15/23 underwent removal of eroded [employer] scICD--no Lifevest (due to LVAD) and no plan to replace ICD as not requiring therapy post LVAD with concern for reinfection. Reported back pain (primarily upper back pain) for the past 3-4 days. He reported that it started 2 days after stopping pirmacor and he felt it may be related. Reported some shortness of breath after walking long distances which has been ongoing for months now and has not changed lately. He has lost some weight over last few months since his  "but weight has been stable over the last month. Patient admitted after being out of [facility] for 1 week with ongoing back pain. Patient stated he has not taken his insulin in a few weeks due to running out of glucometer testing supplies. He previously had a William, but is not wearing one. BG >700 at [facility]. Endo consulted for BG management.      Interval HPI:   Overnight events: Remains in CSU. BG at and below goal ranges on current SQ insulin regimen. Diet diabetic Cardiac (Low Na/Chol); 2000 Calorie; Fluid - 1500mL; Standard Tray    Eatin%  Nausea: No  Hypoglycemia and intervention: Yes, lab value of 41 and POCT glucose 59, 63. Patient given snacks with appropriate BG response.   Fever: No  TPN and/or TF: No  If yes, type of TF/TPN and rate: n/a    /72 (BP Location: Left arm, Patient Position: Lying)   Pulse 82   Temp 97.5 °F (36.4 °C) (Axillary)   Resp 18   Ht 6' 3" (1.905 m)   Wt 68.5 kg (151 lb 0.2 oz)   SpO2 100%   BMI 18.88 kg/m²     Labs Reviewed and Include    Recent Labs   Lab 24  0205   GLU 41*   CALCIUM 9.1   ALBUMIN 2.6*   PROT 8.5*   *   K 3.4*   CO2 26   CL 93*   BUN 20   CREATININE 1.2   ALKPHOS 230*   ALT 8*   AST 31   BILITOT 1.9*     Lab Results   Component Value Date    WBC 12.97 (H) 2024    HGB 7.8 (L) 2024    HCT 26.9 (L) 2024    MCV 67 (L) 2024     (H) 2024     Recent Labs   Lab 24  1058   TSH 2.009     Lab Results   Component Value Date    HGBA1C 10.3 (H) 2024       Nutritional status:   Body mass index is 18.88 kg/m².  Lab Results   Component Value Date    ALBUMIN 2.6 (L) 2024    ALBUMIN 2.5 (L) 2024    ALBUMIN 2.4 (L) 2024     Lab Results   Component Value Date    PREALBUMIN 10 (L) 2024    PREALBUMIN 9 (L) 2024    PREALBUMIN 8 (L) 2024       Estimated Creatinine Clearance: 80.1 mL/min (based on SCr of 1.2 mg/dL).    Accu-Checks  Recent Labs     24  0024 " 09/01/24  0450 09/01/24  0558 09/01/24  0754 09/01/24  1058 09/01/24  1548 09/01/24  2037 09/02/24  0529 09/02/24  0550 09/02/24  0610   POCTGLUCOSE 225* 165* 187* 214* 97 107 125* 59* 63* 94       Current Medications and/or Treatments Impacting Glycemic Control  Immunotherapy:    Immunosuppressants       None          Steroids:   Hormones (From admission, onward)      None          Pressors:    Autonomic Drugs (From admission, onward)      None          Hyperglycemia/Diabetes Medications:   Antihyperglycemics (From admission, onward)      Start     Stop Route Frequency Ordered    09/02/24 0900  insulin glargine U-100 (Lantus) pen 14 Units         -- SubQ 2 times daily 09/02/24 0838    09/01/24 1130  insulin aspart U-100 pen 12 Units         -- SubQ 3 times daily with meals 09/01/24 0845    08/25/24 1506  insulin aspart U-100 pen 0-10 Units         -- SubQ Before meals & nightly PRN 08/25/24 1407            ASSESSMENT and PLAN    Cardiac/Vascular  * LVAD (left ventricular assist device) present  Managed by primary team  Optimize blood glucose control        CHF (NYHA class IV, ACC/AHA stage D)  Managed by primary team  Optimize blood glucose control      Endocrine  Type 2 diabetes mellitus with hyperglycemia, with long-term current use of insulin  BG goal 140-180  Noncompliant T2DM.       Plan:   - Decrease Lantus (Insulin Glargine) to 14 units BID (0.8 u/kg dosing)   - Decrease Novolog (Insulin Aspart) to 10 units TIDWM.(20% dose reduction; continued hypoglycemia noted) HOLD if BG < 100  - Continue moderate dose correction (150/25)  - BG checks AC/HS  - Hypoglycemia protocol in place    ** Please notify Endocrine for any change and/or advance in diet**  ** Please call Endocrine for any BG related issues **    Discharge Planning:   TBD. Please notify endocrinology prior to discharge.  Patient requires glucometer and testing supplies.  Recommend he resumes his William for blood sugar monitoring.          Jody WAGNER  LUCÍA Starkey  Endocrinology  [facility] - [facility] Stepdown

## 2024-09-02 NOTE — NURSING
Pt refused standing weight d/t back pain, team aware, unable to get bed weight d/t there is bed extension, and bed scale is not working. 1145: standing weight 73.1 kg.

## 2024-09-02 NOTE — NURSING
Lab called with a critical sugar of 41. MD notified.  Patient asymptomatic. POCT glucose was 59. Patient refused glucose tablets and wanted snacks. Gave patient jello and cereal. Recheck in 15 sugar was 63. Gave the patient more jello with another recheck in 15. Patient sugar back up to 94. Patient verbalized he feels fine. Plan of care continues.

## 2024-09-02 NOTE — PLAN OF CARE
Pt free of falls and injury. Pt AAOx4 with VSS. Fall precautions remain in place.  Pt remains on room air. Sats %. NSR on telemetry with LVAD artifact. LVAD hum present and smooth. VAD numbers and dopplers WDL. DLES dressing CDI. Plan of care reviewed with pt. Intake and output monitored. Continued IV antibiotics. Glucose monitored. Hypoglycemia this AM. See previous note.  Patient didn't complain of back pain. Pt denies chest pain, headache, and SOB. No acute distress noted,  plan of care continues.      Problem: Adult Inpatient Plan of Care  Goal: Plan of Care Review  Outcome: Progressing  Goal: Patient-Specific Goal (Individualized)  Outcome: Progressing  Goal: Absence of Hospital-Acquired Illness or Injury  Outcome: Progressing  Goal: Optimal Comfort and Wellbeing  Outcome: Progressing  Goal: Readiness for Transition of Care  Outcome: Progressing     Problem: Diabetes Comorbidity  Goal: Blood Glucose Level Within Targeted Range  Outcome: Progressing     Problem: Infection  Goal: Absence of Infection Signs and Symptoms  Outcome: Progressing     Problem: Ventricular Assist Device  Goal: Optimal Adjustment to Device  Outcome: Progressing  Goal: Absence of Bleeding  Outcome: Progressing  Goal: Absence of Embolism Signs and Symptoms  Outcome: Progressing  Goal: Optimal Blood Flow  Outcome: Progressing  Goal: Absence of Infection Signs and Symptoms  Outcome: Progressing  Goal: Effective Right-Sided Heart Function  Outcome: Progressing     Problem: Ventricular Assist Device  Goal: Optimal Adjustment to Device  Outcome: Progressing  Goal: Absence of Bleeding  Outcome: Progressing  Goal: Absence of Embolism Signs and Symptoms  Outcome: Progressing  Goal: Optimal Blood Flow  Outcome: Progressing  Goal: Absence of Infection Signs and Symptoms  Outcome: Progressing  Goal: Effective Right-Sided Heart Function  Outcome: Progressing     Problem: Coping Ineffective  Goal: Effective Coping  Outcome: Progressing     Problem: Fall  Injury Risk  Goal: Absence of Fall and Fall-Related Injury  Outcome: Progressing     Problem: Sepsis/Septic Shock  Goal: Optimal Coping  Outcome: Progressing  Goal: Absence of Bleeding  Outcome: Progressing  Goal: Blood Glucose Level Within Targeted Range  Outcome: Progressing  Goal: Absence of Infection Signs and Symptoms  Outcome: Progressing  Goal: Optimal Nutrition Intake  Outcome: Progressing

## 2024-09-02 NOTE — PLAN OF CARE
Recommendations  1. Continue cardiac/ diabetic diet as tolerated   - double protein as medically feasible     2. Continue Boost Plus (vanilla) BID to optimize nutritional intake    3. RD following.    Goals: Meet % EEN/ ENP by f/u date  Nutrition Goal Status: goal met  Communication of RD Recs: POC

## 2024-09-02 NOTE — SUBJECTIVE & OBJECTIVE
"Interval HPI:   Overnight events: Remains in CSU. BG at and below goal ranges on current SQ insulin regimen. Diet diabetic Cardiac (Low Na/Chol); 2000 Calorie; Fluid - 1500mL; Standard Tray    Eatin%  Nausea: No  Hypoglycemia and intervention: Yes, lab value of 41 and POCT glucose 59, 63. Patient given snacks with appropriate BG response.   Fever: No  TPN and/or TF: No  If yes, type of TF/TPN and rate: n/a    /72 (BP Location: Left arm, Patient Position: Lying)   Pulse 82   Temp 97.5 °F (36.4 °C) (Axillary)   Resp 18   Ht 6' 3" (1.905 m)   Wt 68.5 kg (151 lb 0.2 oz)   SpO2 100%   BMI 18.88 kg/m²     Labs Reviewed and Include    Recent Labs   Lab 24  0205   GLU 41*   CALCIUM 9.1   ALBUMIN 2.6*   PROT 8.5*   *   K 3.4*   CO2 26   CL 93*   BUN 20   CREATININE 1.2   ALKPHOS 230*   ALT 8*   AST 31   BILITOT 1.9*     Lab Results   Component Value Date    WBC 12.97 (H) 2024    HGB 7.8 (L) 2024    HCT 26.9 (L) 2024    MCV 67 (L) 2024     (H) 2024     Recent Labs   Lab 24  1058   TSH 2.009     Lab Results   Component Value Date    HGBA1C 10.3 (H) 2024       Nutritional status:   Body mass index is 18.88 kg/m².  Lab Results   Component Value Date    ALBUMIN 2.6 (L) 2024    ALBUMIN 2.5 (L) 2024    ALBUMIN 2.4 (L) 2024     Lab Results   Component Value Date    PREALBUMIN 10 (L) 2024    PREALBUMIN 9 (L) 2024    PREALBUMIN 8 (L) 2024       Estimated Creatinine Clearance: 80.1 mL/min (based on SCr of 1.2 mg/dL).    Accu-Checks  Recent Labs     24  0024 24  0450 24  0558 24  0754 24  1058 24  1548 24  2037 24  0529 24  0550 24  0610   POCTGLUCOSE 225* 165* 187* 214* 97 107 125* 59* 63* 94       Current Medications and/or Treatments Impacting Glycemic Control  Immunotherapy:    Immunosuppressants       None          Steroids:   Hormones (From admission, " onward)      None          Pressors:    Autonomic Drugs (From admission, onward)      None          Hyperglycemia/Diabetes Medications:   Antihyperglycemics (From admission, onward)      Start     Stop Route Frequency Ordered    09/02/24 0900  insulin glargine U-100 (Lantus) pen 14 Units         -- SubQ 2 times daily 09/02/24 0838    09/01/24 1130  insulin aspart U-100 pen 12 Units         -- SubQ 3 times daily with meals 09/01/24 0845    08/25/24 1506  insulin aspart U-100 pen 0-10 Units         -- SubQ Before meals & nightly PRN 08/25/24 1407

## 2024-09-02 NOTE — PROGRESS NOTES
[facility] - [facility]iology Stepdown  Heart Transplant  Progress Note    Patient Name: Kevan Queen  MRN: [id number]  Admission Date: 8/25/2024  Hospital Length of Stay: 8 days  Attending Physician: Dalia Crum MD  Primary Care Provider: Rosio Armendariz FNP  Principal Problem:LVAD (left ventricular assist device) present    Subjective:     Interval History:   Pt doing well overnight.     Continues to have very significant back pain. CT spine cervical, thoracic and lumbar negative for discitis/infectious concern and renal collection/pyelo appears to be unchanged. Multimodal pain regimen with tylenol, lidocaine patch and oxycodone prn. Will schedule flexeril and assess for any change. He has not used any PRN medication over the last 36 to 48 hours.   Will consider additional prn or adjusting current regimen if not improving despite treatment of pyelonephritis. Continues to deny any chest pain, shortness of breath, lightheadedness, dizziness, fevers or chills overnight.         MSSA bacteremia- on cefazolin. PICC removed on 8/29. Yeast growing in blood as well- initiated micafungin on 8/30, d/w ID. No symptoms per patient/no fevers/no WBC. Microccous likely contaminant.  Sources are from LVAD  driveline vs Previous PICC. ID is following. Continuing on current regimen of cefazolin and micafungin.     Responding well to 2 mg of Bumex for diuresis, no issue any allergic reaction.  Kidney function at baseline and euvolemic/ no JVD. No abdominal distension, abdomen soft. Net negative 1390 mL over last 24 hours. BNP slightly up but clinically responding well so continue current regimen.         Continuous Infusions: none       Scheduled Meds:   [START ON 9/9/2024] amiodarone  200 mg Oral Daily    amiodarone  400 mg Oral BID    amLODIPine  5 mg Oral Daily    aspirin  81 mg Oral Daily    atorvastatin  40 mg Oral Daily    bumetanide  2 mg Oral BID loop    ceFAZolin (Ancef) IV (PEDS and ADULTS)  2 g Intravenous Q8H     cyclobenzaprine  5 mg Oral Nightly    DULoxetine  30 mg Oral Daily    eplerenone  25 mg Oral Daily    gabapentin  100 mg Oral BID    gabapentin  400 mg Oral QHS    insulin aspart U-100  10 Units Subcutaneous TIDWM    insulin glargine U-100  14 Units Subcutaneous BID    levETIRAcetam  1,500 mg Oral BID    LIDOcaine  2 patch Transdermal Q24H    magnesium oxide  400 mg Oral Daily    micafungin  100 mg Intravenous Q24H    nicotine  1 patch Transdermal Daily    pantoprazole  40 mg Oral Daily    polyethylene glycol  17 g Oral Daily    potassium chloride  40 mEq Oral Q4H    warfarin  1 mg Oral Every Mon, Wed, Fri    warfarin  2 mg Oral Every Tues, Thurs, Sat, Sun     PRN Meds:  Current Facility-Administered Medications:     acetaminophen, 650 mg, Oral, Q6H PRN    dextrose 10%, 12.5 g, Intravenous, PRN    dextrose 10%, 25 g, Intravenous, PRN    glucagon (human recombinant), 1 mg, Intramuscular, PRN    glucose, 16 g, Oral, PRN    glucose, 24 g, Oral, PRN    insulin aspart U-100, 0-10 Units, Subcutaneous, QID (AC + HS) PRN    ondansetron, 4 mg, Oral, Q8H PRN    oxyCODONE, 10 mg, Oral, Daily PRN    Review of patient's allergies indicates:   Allergen Reactions    Torsemide Hives     Objective:     Vital Signs (Most Recent):  Temp: 98.3 °F (36.8 °C) (09/02/24 1106)  Pulse: 85 (09/02/24 1106)  Resp: 18 (09/02/24 1106)  BP: 105/72 (09/02/24 0758)  SpO2: 98 % (09/02/24 1106) Vital Signs (24h Range):  Temp:  [97.5 °F (36.4 °C)-98.5 °F (36.9 °C)] 98.3 °F (36.8 °C)  Pulse:  [74-97] 85  Resp:  [18] 18  SpO2:  [95 %-100 %] 98 %  BP: ()/(0-78) 105/72     No data found.    Body mass index is 18.88 kg/m².      Intake/Output Summary (Last 24 hours) at 9/2/2024 1116  Last data filed at 9/2/2024 0602  Gross per 24 hour   Intake 480 ml   Output 2025 ml   Net -1545 ml       Hemodynamic Parameters:       EKG- without ischemic changes this AM        Physical Exam  Constitutional:       General: He is not in acute distress.     Appearance:  He is normal weight.   HENT:      Head: Normocephalic and atraumatic.      Right Ear: External ear normal.      Left Ear: External ear normal.      Nose: Nose normal.      Mouth/Throat:      Pharynx: Oropharynx is clear.   Cardiovascular:      Comments: Normal VAD hum, JVD present but improving   Pulmonary:      Effort: Pulmonary effort is normal.   Abdominal:      General: Abdomen is flat. Bowel sounds are normal. There is no distension (Distended but soft.).      Palpations: Abdomen is soft. There is no mass.      Tenderness: There is no abdominal tenderness. There is right CVA tenderness.   Musculoskeletal:      Cervical back: Normal range of motion.      Right lower leg: No edema.      Left lower leg: No edema.      Comments: Cont b/l back pain- upper spine and b/l paraspinal/upper back muscular pain.    Skin:     General: Skin is warm.      Comments: Chest wall on the right side with gynecomastia, no overlying skin changes, no tenderness at the site however palpable mass versus nodule present.   Neurological:      Mental Status: He is alert. Mental status is at baseline.            Significant Labs:  CBC:  Recent Labs   Lab 09/01/24  0234 09/02/24  0205 09/02/24  0601   WBC 12.27 12.46 12.97*   RBC 4.02* 3.47* 4.03*   HGB 7.8* 6.6* 7.8*   HCT 26.8* 23.4* 26.9*   * 561* 537*   MCV 67* 67* 67*   MCH 19.4* 19.0* 19.4*   MCHC 29.1* 28.2* 29.0*     BNP:  Recent Labs   Lab 08/28/24  0345 08/30/24  0308 09/02/24  0205   * 462* 1,053*     CMP:  Recent Labs   Lab 08/28/24  0345 08/29/24  0346 08/30/24  0308 08/31/24  0244 09/01/24  0234 09/02/24  0205   *   < > 287* 96 199* 41*   CALCIUM 9.0   < > 9.1 9.3 9.3 9.1   ALBUMIN 2.4*  --  2.5*  --   --  2.6*   PROT 7.6  --  8.5*  --   --  8.5*   *   < > 130* 134* 128* 130*   K 3.6   < > 4.1 3.6 4.0 3.4*   CO2 26   < > 25 26 24 26   CL 92*   < > 95 94* 93* 93*   BUN 19   < > 21* 21* 21* 20   CREATININE 1.1   < > 1.3 1.0 1.3 1.2   ALKPHOS 215*  --   221*  --   --  230*   ALT 13  --  11  --   --  8*   AST 24  --  31  --   --  31   BILITOT 2.4*  --  2.0*  --   --  1.9*    < > = values in this interval not displayed.      Coagulation:   Recent Labs   Lab 08/31/24  0244 09/01/24  0234 09/02/24  0205   INR 2.5* 2.4* 2.8*   APTT 39.0* 38.8* 40.9*     LDH:  Recent Labs   Lab 08/31/24  0244 09/01/24  0234 09/02/24  0205   * 316* 323*     Microbiology:  Microbiology Results (last 7 days)       Procedure Component Value Units Date/Time    Culture, Anaerobe [[id number]] Collected: 08/27/24 1446    Order Status: Completed Specimen: Skin from Abdomen Updated: 09/02/24 0936     Anaerobic Culture No anaerobes isolated    Narrative:      Driveline site    Blood culture [[id number]]  (Abnormal) Collected: 08/30/24 1152    Order Status: Completed Specimen: Blood Updated: 09/02/24 0802     Blood Culture, Routine Gram stain dudley bottle: yeast      Results called to and read back by: Rufino 09/01/2024  02:03      YEAST   Identification pending  ID consult required at [facility].      Narrative:      Lft AC    Blood culture [[id number]] Collected: 09/01/24 0939    Order Status: Completed Specimen: Blood Updated: 09/01/24 1915     Blood Culture, Routine No Growth to date    Blood culture [[id number]] Collected: 09/01/24 0939    Order Status: Completed Specimen: Blood Updated: 09/01/24 1915     Blood Culture, Routine No Growth to date    Blood culture [[id number]] Collected: 08/30/24 1152    Order Status: Completed Specimen: Blood Updated: 09/01/24 1412     Blood Culture, Routine No Growth to date      No Growth to date      No Growth to date    Narrative:      Rt AC    Blood culture [[id number]]  (Abnormal) Collected: 08/27/24 1751    Order Status: Completed Specimen: Blood Updated: 09/01/24 0951     Blood Culture, Routine Gram stain dudley bottle: Gram positive cocci in clusters resembling Staph      Results called to and read back by: Lorri Riggs  RN  08/29/2024   13:50      Gram stain aer bottle: yeast      Results called to and read back by:Lorri Riggs RN 08/30/2024      STAPHYLOCOCCUS AUREUS  ID consult required at [facility] and [facility].  For susceptibility see order #[id number]      Narrative:      Blood culture # 2 different location.    Blood culture [[id number]] Collected: 08/28/24 2226    Order Status: Completed Specimen: Blood Updated: 09/01/24 0947     Blood Culture, Routine Gram stain aer bottle: yeast      Gram stain dudley bottle: yeast      Positive results previously called 08/30/2024    Narrative:      Code 49856  Draw sample # 2 from separate site    Blood culture [[id number]] Collected: 08/28/24 2226    Order Status: Completed Specimen: Blood Updated: 09/01/24 0946     Blood Culture, Routine Gram stain aer bottle: yeast      Results called to and read back by:Lorri Riggs RN 08/30/2024  17:10    Narrative:      Code 73545  Draw sample # 2 from separate site    Blood culture [[id number]]  (Abnormal)  (Susceptibility) Collected: 08/27/24 1802    Order Status: Completed Specimen: Blood Updated: 08/31/24 1049     Blood Culture, Routine Gram stain aer bottle: Gram positive cocci in clusters resembling Staph      Results called to and read back by:Miguelina [facility] RN 08/28/2024  22:59      MICROCOCCUS SPECIES  Organism is a probable contaminant        STAPHYLOCOCCUS AUREUS  ID consult required at [facility] and [facility].      Rapid Organism ID by PCR (from Blood culture) [[id number]] Collected: 08/28/24 2226    Order Status: Completed Updated: 08/30/24 1823     Enterococcus faecalis Not Detected     Enterococcus faecium Not Detected     Listeria monocytogenes Not Detected     Staphylococcus spp. Not Detected     Staphylococcus aureus Not Detected     Staphylococcus epidermidis Not Detected     Staphylococcus lugdunensis Not Detected     Streptococcus species Not Detected     Streptococcus agalactiae Not Detected      Streptococcus pneumoniae Not Detected     Streptococcus pyogenes Not Detected     Acinetobacter calcoaceticus/baumannii complex Not Detected     Bacteroides fragilis Not Detected     Enterobacterales Not Detected     Enterobacter cloacae complex Not Detected     Escherichia coli Not Detected     Klebsiella aerogenes Not Detected     Klebsiella oxytoca Not Detected     Klebsiella pneumoniae group Not Detected     Proteus Not Detected     Salmonella sp Not Detected     Serratia marcescens Not Detected     Haemophilus influenzae Not Detected     Neisseria meningtidis Not Detected     Pseudomonas aeruginosa Not Detected     Stenotrophomonas maltophilia Not Detected     Candida albicans Not Detected     Candida auris Not Detected     Candida glabrata Not Detected     Candida krusei Not Detected     Candida parapsilosis Not Detected     Candida tropicalis Not Detected     Cryptococcus neoformans/gattii Not Detected     CTX-M (ESBL ) Test Not Applicable     IMP (Carbapenem resistant) Test Not Applicable     KPC resistance gene (Carbapenem resistant) Test Not Applicable     mcr-1  Test Not Applicable     mec A/C  Test Not Applicable     mec A/C and MREJ (MRSA) gene Test Not Applicable     NDM (Carbapenem resistant) Test Not Applicable     OXA-48-like (Carbapenem resistant) Test Not Applicable     van A/B (VRE gene) Test Not Applicable     VIM (Carbapenem resistant) Test Not Applicable    Narrative:      Code 20992  Draw sample # 2 from separate site    Culture, Anaerobe [[id number]] Collected: 08/25/24 1733    Order Status: Completed Specimen: Wound from Abdomen Updated: 08/30/24 1405     Anaerobic Culture No anaerobes isolated    Narrative:      Drive line exit site    Aerobic culture [[id number]]  (Abnormal)  (Susceptibility) Collected: 08/27/24 1446    Order Status: Completed Specimen: Skin from Abdomen Updated: 08/29/24 1424     Aerobic Bacterial Culture STAPHYLOCOCCUS AUREUS  Rare      Narrative:      Driveline  site    MRSA/SA Rapid ID by PCR from Blood culture [[id number]]  (Abnormal) Collected: 08/27/24 1802    Order Status: Completed Updated: 08/29/24 0027     Staph aureus ID by PCR Positive     Methicillin Resistant ID by PCR Negative    Blood culture [[id number]]     Order Status: Canceled Specimen: Blood     Blood culture [[id number]]     Order Status: Canceled Specimen: Blood             I have reviewed all pertinent labs within the past 24 hours.    Estimated Creatinine Clearance: 80.1 mL/min (based on SCr of 1.2 mg/dL).    Diagnostic Results:  I have reviewed and interpreted all pertinent imaging results/findings within the past 24 hours.  Assessment and Plan:     Mr. Kevan Thacker is a 39 year old male with stage D 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 He underwent RVAD removal and chest closure 6/30/2022.  He was weaned off  but he had to restarted due to RVF. He was eventually transitioned to milrinone (secondary to  shortage) now supposed to be on 0.25 mcg/kg/min. He has a history of unclear syncope vs seizures and is followed by neuro for this and is on Keppra.  He is being admitted after being out of [facility] for 1 week with ongoing back pain.      On 11/15/23 underwent removal of eroded [employer] scICD--no Lifevest (due to LVAD) and no plan to replace ICD as not requiring therapy post LVAD with concern for reinfection.  He has not had neurology f/u with seizure hx on keppra so coordinator to assist him with obtaining appt.      Reports back pain (primarily upper back pain) for the past 3-4 days. He reports that it started 2 days after stopping pirmacor and he feels it may be related. He has also been sleeping in the couch and identifies that it may also be  related to the back pain. He denies lifting anything heavy or any falls /trauma. No red flags including incontinence of stool or urine. No numbness in the groin area or weakness in the legs or upper extremity reported. He reports he was not able to go for appointment because of transport issue that has now been resolved. He denies orthopnea, PND, weight gain, leg swelling. He says that he does have some shortness of breath after walking long distances which has been ongoing for months now and has not changed lately. He has lost some weight over last few months since his but weight has been stable over the last month. He denied headache, constipation, diarrhea, SOB, cough, palpitations or any additional symptoms.     * LVAD (left ventricular assist device) present  -HeartMate 3 Implanted  6/29/2022  as DT  -HTS Primary  -cont coumadin, Goal INR 2.0-3.0.   Lab Results   Component Value Date    INR 2.8 (H) 09/02/2024    INR 2.4 (H) 09/01/2024    INR 2.5 (H) 08/31/2024       -Antiplatelets ASA 81 mg  -LDH is stable overall today. Will continue to monitor daily.  -Speed set at 5100, LSL 4700 rpm  -Interrogation notable for  power cable disconnected, low voltage advisory, PI events  -Not listed for OHTx- advised pt he would not be candidate for OHTx (noncompliance/poor f/up)   -UDS negative  -attempted to call partner Robyn, unfortunately goes to        Procedure: Device Interrogation Including analysis of device parameters  Current Settings: Ventricular Assist Device  Review of device function is stable/unstable stable        9/2/2024     8:00 AM 9/2/2024     3:52 AM 9/1/2024    11:53 PM 9/1/2024     7:46 PM 9/1/2024     4:18 PM 9/1/2024    11:26 AM 9/1/2024     9:37 AM   TXP LVAD INTERROGATIONS   Type HeartMate3 HeartMate3 HeartMate3 HeartMate3 HeartMate3 HeartMate3 HeartMate3   Flow 4.1 4.1 3.9 4.1 4.1 3.8 4.1   Speed 5100 5100 5100 5100 5100 5100 5100   PI 5.9 6.1 5.9 5.3 6.1 6.2 5.5   Power (Serna) 3.6 3.6 3.6 3.6  3.6 3.6 3.6   LSL 4700 4700 4700 4700 4700 4700 4700   Pulsatility Pulse  No Pulse Pulse            CHF (NYHA class IV, ACC/AHA stage D)    Updated 8/26/24:  EF 5-10% global hypokinesis, RV enlargement and global hypokinesis, biatrial enlargement, mild-to-moderate MR, severe TR, no van HeartMate 3 in place, aortic valve does not open, LVEDd 7.2  Previous TTE 9/5/23 with EF 10-15%, mod to sever TR, LVEDd 7.11, LVAD - HM3 in place    -continue on bumex 2mg BID, cont inpatient treatment for bacteremia and fungemia   -GDMT: d/c spironolactone given possible gynecomastia, transition to epleronone   -Previously on home Primacor 0.25 at home but ran out approx 5 days prior to admission and also reports he occasionally disconnects himself from his pump to perform certain activities. Will plan to d/c off primacor   - TTE With LVEF 5-10%, poor RV function, dilation, no signs of endocarditis on TTE   -palliative care c/s and pt discussed with service, appreciate f/up- plan for continued follow up with palliative outpatient as well with Dr. Carr   -repeated [facility] discussion on 8/31- full code, aggressive care, pt educated about compliance going forward - re-iterated. Pt understanding, apologetic for previous noncompliance. Pt understands he is not a candidate for heart transplant and will not be evaluated due to his noncompliance.       Bilateral back pain  Pt reports initial pain following running out of primacor. S/p spinal Xrays without significant abnormality besides degenerative disc disease.  NO red flag symptoms on admission  CT spine cervical, thoracic and lumbar negative for discitis/infectious concern and renal collection/pyelo appears to be unchanged.     Plan:  - Multimodal pain control  - titrate to patient's response  - C/w home gabapentin, tylenol prn and lidocaine patches.   - C/w Prn oxycodone added  - has not been using   - Schedule muscle relaxant overnight and assess for change in symptoms.   - Back pain more  pronounced on R side, most likely cause is his pyelonephritis  -c/w abx for pyelonephritis        Pulmonary nodules  Multiple pulmonary nodules seen on CT scan with contrast. Pt does have hx tobacco use. Lymph node enlargement noted as well. Pt has weight loss but not drastic  -need f/up scan in 3-6 months to assess stability of nodules     Pyelonephritis  Seen on ct with contrast. ID following.   -cont abx per ID   -no need for percutaneous drain per IR at this time unless pt decompensates  -current bacteremia/yeast not from renal source     Severe sepsis  -CT chest/abdomen and pelvis w/ contrast w/ pyelonephritis and renal abscess   -repeat CT spine with contrast +visualization of kidney read is stable, without worsening pyelo or abscess, no evidence of discitis    Plan:  -F/up cultures- +ve for staph, neg MRSA pcr, yeast now growing in blood as well  -Follow cultires from 9/1 - NGTD  -ID following-c/w cefazolin, micafungin  -C/s to Optho for eval of endopthalmitis in setting of yeast in blood   -Removed PICC on 8/28  -Driveline infection vs pyelo vs PICC related  - TTE negative for endocarditis      Atrial flutter  pt previously went into atach vs aflutter. Pt was started on amiodarone, with rhythm aborted to NSR following bolus. BP remained stable.   -BMP, replace lytes as needed   -cont amiodarone- will complete load and keep on maintenance dosing thereafter  -pt does report intermittently at home having episodes of palpitations that may also represent paroxysmal afib vs flutter at home. Already on a/c. Chadvasc of 5          Polysubstance abuse  Pt has history of polysubstance use.   -UDS negative  -prn oxycodone for pyelonephritis pain x1 per day max given hx substance use       Subcutaneous nodule of breast  Pt with unilateral R sided gynecomastia with hard nodular feeling beneath soft subcutaneous tissue of breast. No notable skin dimpling or rash.   CT with contrast reporting gyneocomastia without fluid  collection/abscess. Pulmonary nodules seen on exam (needs f/up scan in 3-6 mos), enlarged lymph nodes noted on scan as well. D/w pt need for continued f/up outpatient for cancer screening. Appears to be more reactive than malignant on most recent scan.  -transition from spironolactone to epleronone     Supratherapeutic INR  -Goal INR 2-3 , INR 4.7 on admission - improved  - pharmacy  to manage coumadin dosing, cont warfarin   Check INR daily     Lab Results   Component Value Date    INR 2.8 (H) 09/02/2024    INR 2.4 (H) 09/01/2024    INR 2.5 (H) 08/31/2024         Hypokalemia  Replace as needed and monitor      Type 2 diabetes mellitus with hyperglycemia, with long-term current use of insulin  A1C 10.3  Endocrinology consulted for glucose management, largely uncontrolled       Lab Results   Component Value Date    HGBA1C 10.3 (H) 08/25/2024    HGBA1C >14.0 (H) 04/26/2024    HGBA1C >14.0 (H) 02/17/2024    [id number] 7.5 06/12/2023    [id number] 8.5 02/07/2023           Moderate malnutrition  -pt with aggressive care goals  -c/s nutrition, appreciate assistance     Seizure-like activity  History   C/w home keppra 1.5 gm BID        Enrique Vazquez MD  Heart Transplant  [facility] - Cardiology Stepdown

## 2024-09-02 NOTE — ASSESSMENT & PLAN NOTE
BG goal 140-180  Noncompliant T2DM.       Plan:   - Decrease Lantus (Insulin Glargine) to 14 units BID (0.8 u/kg dosing)   - Decrease Novolog (Insulin Aspart) to 10 units TIDWM.(20% dose reduction; continued hypoglycemia noted) HOLD if BG < 100  - Continue moderate dose correction (150/25)  - BG checks AC/HS  - Hypoglycemia protocol in place    ** Please notify Endocrine for any change and/or advance in diet**  ** Please call Endocrine for any BG related issues **    Discharge Planning:   TBD. Please notify endocrinology prior to discharge.  Patient requires glucometer and testing supplies.  Recommend he resumes his William for blood sugar monitoring.

## 2024-09-02 NOTE — NURSING
Notified Lalito. NP BG 94, and Lalito NP wants to hold lantus now and until lunch time and see what BG is then. Lantus hold for now. Lunch  , lantus and novolog given.

## 2024-09-02 NOTE — ASSESSMENT & PLAN NOTE
-HeartMate 3 Implanted  6/29/2022  as DT  -HTS Primary  -cont coumadin, Goal INR 2.0-3.0.   Lab Results   Component Value Date    INR 2.8 (H) 09/02/2024    INR 2.4 (H) 09/01/2024    INR 2.5 (H) 08/31/2024       -Antiplatelets ASA 81 mg  -LDH is stable overall today. Will continue to monitor daily.  -Speed set at 5100, LSL 4700 rpm  -Interrogation notable for  power cable disconnected, low voltage advisory, PI events  -Not listed for OHTx- advised pt he would not be candidate for OHTx (noncompliance/poor f/up)   -UDS negative  -attempted to call partner Robyn, unfortunately goes to        Procedure: Device Interrogation Including analysis of device parameters  Current Settings: Ventricular Assist Device  Review of device function is stable/unstable stable        9/2/2024     8:00 AM 9/2/2024     3:52 AM 9/1/2024    11:53 PM 9/1/2024     7:46 PM 9/1/2024     4:18 PM 9/1/2024    11:26 AM 9/1/2024     9:37 AM   TXP LVAD INTERROGATIONS   Type HeartMate3 HeartMate3 HeartMate3 HeartMate3 HeartMate3 HeartMate3 HeartMate3   Flow 4.1 4.1 3.9 4.1 4.1 3.8 4.1   Speed 5100 5100 5100 5100 5100 5100 5100   PI 5.9 6.1 5.9 5.3 6.1 6.2 5.5   Power (Serna) 3.6 3.6 3.6 3.6 3.6 3.6 3.6   LSL 4700 4700 4700 4700 4700 4700 4700   Pulsatility Pulse  No Pulse Pulse

## 2024-09-02 NOTE — ASSESSMENT & PLAN NOTE
MSSA DLESI  Bacteremia  Pyelnephritis/renal abscess  Fungemia    38 yo male with LVAD HM3 as DT, prior MSSA DLESI/bacteremia c/b empyema (on suppressive cefadroxil) and eroded ICD/pocket infection s/p removal, seizure admitted for back pain. Hospital course notable for blcx with micrococcus (suspect contaminant), MSSA bacteremia (suspect due to DLES), and fungemia (suspect 2/2 to prior picc). DLES also with MSSA.  CT with contrast now with irregular nonenhancing renal lesion c/f pyelonephritis or potential abscess, R chest lesion c/f gynecomastia. Old PICC pulled 8/29. TTE without IE. S/p ophthalmology evaluation, no evidence of endophthalmitis. CT spine without evidence of OM/discitis.     Recommendations:  -cefazolin 2g q8hr IV   -micafungin 100 mg iv daily  -f/u BCx 9/1 if still positive will have to consider GUILLERMO  -f/u ID of yeast in blood

## 2024-09-02 NOTE — PROGRESS NOTES
[facility] - Cardiology Stepdown  Adult Nutrition  Progress Note    SUMMARY       Recommendations  1. Continue cardiac/ diabetic diet as tolerated   - double protein as medically feasible     2. Continue Boost Plus (vanilla) BID to optimize nutritional intake    3. RD following.    Goals: Meet % EEN/ ENP by f/u date  Nutrition Goal Status: goal met  Communication of RD Recs: POC    Assessment and Plan    Endocrine  Moderate malnutrition  Malnutrition Type:  Context: acute illness or injury  Level: mild    Related to (etiology):   Poor energy intake    Signs and Symptoms (as evidenced by):   Weight Loss (Malnutrition): greater than 10% in 6 months  Energy Intake (Malnutrition): less than or equal to 50% for greater than or equal to 5 days  Subcutaneous Fat (Malnutrition): mild depletion  Muscle Mass (Malnutrition): mild depletion     Malnutrition Characteristic Summary:  Weight Loss (Malnutrition): greater than 10% in 6 months  Energy Intake (Malnutrition): less than or equal to 50% for greater than or equal to 5 days  Subcutaneous Fat (Malnutrition): mild depletion  Muscle Mass (Malnutrition): mild depletion      Interventions/Recommendations (treatment strategy):  1. Diabetic/ Cardiac diet as tolerated - [place] PO intake >50%. 2. If PO intake is <50%, recommend Boost GC BID to help meet needs. 3. RD following.    Nutrition Diagnosis Status:   New      Malnutrition Assessment  Malnutrition Context: acute illness or injury  Malnutrition Level: mild  Skin (Micronutrient): none  Nails (Micronutrient): none  Hair/Scalp (Micronutrient): none  Eyes (Micronutrient): none  Extraoral (Micronutrient): none  Gums (Micronutrient): none  Lips/Mucous Membranes (Micronutrient): none  Teeth (Micronutrient): none  Tongue (Micronutrient): none  Neck/Chest (Micronutrient): none  Musculoskeletal/Lower Extremities: none   Micronutrient Evaluation Summary: no deficiencies   Weight Loss (Malnutrition): greater than 10% in 6  months  Energy Intake (Malnutrition): less than or equal to 50% for greater than or equal to 5 days  Subcutaneous Fat (Malnutrition): mild depletion  Muscle Mass (Malnutrition): mild depletion   Orbital Region (Subcutaneous Fat Loss): well nourished  Upper Arm Region (Subcutaneous Fat Loss): mild depletion  Thoracic and Lumbar Region: mild depletion   Voodoo Region (Muscle Loss): well nourished  Clavicle Bone Region (Muscle Loss): moderate depletion  Clavicle and Acromion Bone Region (Muscle Loss): moderate depletion  Scapular Bone Region (Muscle Loss): well nourished  Dorsal Hand (Muscle Loss): well nourished  Patellar Region (Muscle Loss): well nourished  Anterior Thigh Region (Muscle Loss): mild depletion  Posterior Calf Region (Muscle Loss): mild depletion     Reason for Assessment    Reason For Assessment: RD follow-up  Diagnosis: cardiac disease (LVAD)  Relevant Medical History: CHF; LVAD; DM; Polysubstance abuse  Interdisciplinary Rounds: did not attend    General Information Comments: RD to see pt per f/u assessment. Pt alert in bed during visit. Noted pt w/ hypoglycemia this morning (Glucose 41). Pt was given Jell-O and snacks to increase blood sugar. Pt endorses a good appetite w/ a verbalized 100% PO intake. Noted 100% PO intake per flowsheet. Pt is also receiving Boost BID. Noted a significant weight loss x 7 months (33lbs.) per EMR (02/17 - 86.2 kg; 05/26 - 76.6kg; 08/26 - 70.8 kg).  Pt w/ a stable weight since admission (08/26 - 70.8 kg and 08/28 - 68.5kg). NFPE completed on 08/26, observed to have moderate malnutrition at this time (see PES statement above). Pt denies any questions/ concerns regarding prior DM diet ed given on 08/26. RD following.    Nutrition Discharge Planning: Diabetic/ Cardiac diet upon discharge    Nutrition Related Social Determinants of Health: SDOH: None Identified     Nutrition Risk Screen    Nutrition Risk Screen: no indicators present    Nutrition/Diet History    Spiritual,  "Cultural Beliefs, Mandaeism Practices, Values that Affect Care: no  Food Allergies: NKFA    Anthropometrics    Temp: 97.5 °F (36.4 °C)  Height: 6' 3" (190.5 cm)  Height (inches): 75 in  Weight Method: Standard Scale  Weight:  (pt.state he was in pain his back and bed scale doesn't work.)  Weight (lb): 151.02 lb  Ideal Body Weight (IBW), Male: 196 lb  % Ideal Body Weight, Male (lb): 79.64 %  BMI (Calculated): 18.9     Lab/Procedures/Meds    Pertinent Labs Reviewed: reviewed  Pertinent Labs Comments: H/h 7.8/ 26.9; Na+ 130; K 3.4; Glucose 41  Pertinent Medications Reviewed: reviewed  Pertinent Medications Comments: Atorvastatin; Insulin; Warfarin    Estimated/Assessed Needs    Weight Used For Calorie Calculations: 88.9 kg (196 lb) (IBW)  Energy Calorie Requirements (kcal): 2,361 (PAL 1.25)  Energy Need Method: [facility]  Protein Requirements:  g/day (1.0-1.2 g/kg IBW)  Weight Used For Protein Calculations: 88.9 kg (196 lb) (IBW)  Fluid Requirements (mL): 1mL/kcal or per MD  Estimated Fluid Requirement Method: RDA Method  RDA Method (mL): 2  CHO Requirement: 295g/day    Nutrition Prescription Ordered    Current Diet Order: Diabetic/ Cardiac - 1500mL Fluid  Oral Nutrition Supplement: Boost Plus BID    Evaluation of Received Nutrient/Fluid Intake    I/O: -10L -Since admit  Energy Calories Required: meeting needs  Protein Required: meeting needs  Fluid Required: meeting needs  Comments: LBM 08/30  Tolerance: tolerating  % Intake of Estimated Energy Needs: 75 - 100 %  % Meal Intake: 75 - 100 %    Nutrition Risk    Level of Risk/Frequency of Follow-up: low - moderate (1-2x/ week)     Monitor and Evaluation    Food and Nutrient Intake: energy intake, food and beverage intake  Food and Nutrient Adminstration: diet order  Knowledge/Beliefs/Attitudes: food and nutrition knowledge/skill, beliefs and attitudes  Physical Activity and Function: nutrition-related ADLs and IADLs, factors affecting access to physical " activity  Anthropometric Measurements: height/length, weight, weight change, body mass index, growth pattern indices/percentile ranks  Biochemical Data, Medical Tests and Procedures: electrolyte and renal panel, gastrointestinal profile, glucose/endocrine profile, inflammatory profile, lipid profile  Nutrition-Focused Physical Findings: overall appearance, extremities, muscles and bones, head and eyes, skin     Nutrition Follow-Up    RD Follow-up?: Yes    Lisa Ross, Registration Eligible, Provisional LDN

## 2024-09-02 NOTE — SUBJECTIVE & OBJECTIVE
Interval History:   Pt doing well overnight.     Continues to have very significant back pain. CT spine cervical, thoracic and lumbar negative for discitis/infectious concern and renal collection/pyelo appears to be unchanged. Multimodal pain regimen with tylenol, lidocaine patch and oxycodone prn. Will schedule flexeril and assess for any change. He has not used any PRN medication over the last 36 to 48 hours.   Will consider additional prn or adjusting current regimen if not improving despite treatment of pyelonephritis. Continues to deny any chest pain, shortness of breath, lightheadedness, dizziness, fevers or chills overnight.         MSSA bacteremia- on cefazolin. PICC removed on 8/29. Yeast growing in blood as well- initiated micafungin on 8/30, d/w ID. No symptoms per patient/no fevers/no WBC. Microccous likely contaminant.  Sources are from LVAD  driveline vs Previous PICC. ID is following. Continuing on current regimen of cefazolin and micafungin.     Responding well to 2 mg of Bumex for diuresis, no issue any allergic reaction.  Kidney function at baseline and euvolemic/ no JVD. No abdominal distension, abdomen soft. Net negative 1390 mL over last 24 hours. BNP slightly up but clinically responding well so continue current regimen.         Continuous Infusions: none       Scheduled Meds:   [START ON 9/9/2024] amiodarone  200 mg Oral Daily    amiodarone  400 mg Oral BID    amLODIPine  5 mg Oral Daily    aspirin  81 mg Oral Daily    atorvastatin  40 mg Oral Daily    bumetanide  2 mg Oral BID loop    ceFAZolin (Ancef) IV (PEDS and ADULTS)  2 g Intravenous Q8H    cyclobenzaprine  5 mg Oral Nightly    DULoxetine  30 mg Oral Daily    eplerenone  25 mg Oral Daily    gabapentin  100 mg Oral BID    gabapentin  400 mg Oral QHS    insulin aspart U-100  10 Units Subcutaneous TIDWM    insulin glargine U-100  14 Units Subcutaneous BID    levETIRAcetam  1,500 mg Oral BID    LIDOcaine  2 patch Transdermal Q24H     magnesium oxide  400 mg Oral Daily    micafungin  100 mg Intravenous Q24H    nicotine  1 patch Transdermal Daily    pantoprazole  40 mg Oral Daily    polyethylene glycol  17 g Oral Daily    potassium chloride  40 mEq Oral Q4H    warfarin  1 mg Oral Every Mon, Wed, Fri    warfarin  2 mg Oral Every Tues, Thurs, Sat, Sun     PRN Meds:  Current Facility-Administered Medications:     acetaminophen, 650 mg, Oral, Q6H PRN    dextrose 10%, 12.5 g, Intravenous, PRN    dextrose 10%, 25 g, Intravenous, PRN    glucagon (human recombinant), 1 mg, Intramuscular, PRN    glucose, 16 g, Oral, PRN    glucose, 24 g, Oral, PRN    insulin aspart U-100, 0-10 Units, Subcutaneous, QID (AC + HS) PRN    ondansetron, 4 mg, Oral, Q8H PRN    oxyCODONE, 10 mg, Oral, Daily PRN    Review of patient's allergies indicates:   Allergen Reactions    Torsemide Hives     Objective:     Vital Signs (Most Recent):  Temp: 98.3 °F (36.8 °C) (09/02/24 1106)  Pulse: 85 (09/02/24 1106)  Resp: 18 (09/02/24 1106)  BP: 105/72 (09/02/24 0758)  SpO2: 98 % (09/02/24 1106) Vital Signs (24h Range):  Temp:  [97.5 °F (36.4 °C)-98.5 °F (36.9 °C)] 98.3 °F (36.8 °C)  Pulse:  [74-97] 85  Resp:  [18] 18  SpO2:  [95 %-100 %] 98 %  BP: ()/(0-78) 105/72     No data found.    Body mass index is 18.88 kg/m².      Intake/Output Summary (Last 24 hours) at 9/2/2024 1116  Last data filed at 9/2/2024 0602  Gross per 24 hour   Intake 480 ml   Output 2025 ml   Net -1545 ml       Hemodynamic Parameters:       EKG- without ischemic changes this AM        Physical Exam  Constitutional:       General: He is not in acute distress.     Appearance: He is normal weight.   HENT:      Head: Normocephalic and atraumatic.      Right Ear: External ear normal.      Left Ear: External ear normal.      Nose: Nose normal.      Mouth/Throat:      Pharynx: Oropharynx is clear.   Cardiovascular:      Comments: Normal VAD hum, JVD present but improving   Pulmonary:      Effort: Pulmonary effort is  normal.   Abdominal:      General: Abdomen is flat. Bowel sounds are normal. There is no distension (Distended but soft.).      Palpations: Abdomen is soft. There is no mass.      Tenderness: There is no abdominal tenderness. There is right CVA tenderness.   Musculoskeletal:      Cervical back: Normal range of motion.      Right lower leg: No edema.      Left lower leg: No edema.      Comments: Cont b/l back pain- upper spine and b/l paraspinal/upper back muscular pain.    Skin:     General: Skin is warm.      Comments: Chest wall on the right side with gynecomastia, no overlying skin changes, no tenderness at the site however palpable mass versus nodule present.   Neurological:      Mental Status: He is alert. Mental status is at baseline.            Significant Labs:  CBC:  Recent Labs   Lab 09/01/24  0234 09/02/24  0205 09/02/24  0601   WBC 12.27 12.46 12.97*   RBC 4.02* 3.47* 4.03*   HGB 7.8* 6.6* 7.8*   HCT 26.8* 23.4* 26.9*   * 561* 537*   MCV 67* 67* 67*   MCH 19.4* 19.0* 19.4*   MCHC 29.1* 28.2* 29.0*     BNP:  Recent Labs   Lab 08/28/24  0345 08/30/24  0308 09/02/24  0205   * 462* 1,053*     CMP:  Recent Labs   Lab 08/28/24  0345 08/29/24  0346 08/30/24  0308 08/31/24  0244 09/01/24  0234 09/02/24  0205   *   < > 287* 96 199* 41*   CALCIUM 9.0   < > 9.1 9.3 9.3 9.1   ALBUMIN 2.4*  --  2.5*  --   --  2.6*   PROT 7.6  --  8.5*  --   --  8.5*   *   < > 130* 134* 128* 130*   K 3.6   < > 4.1 3.6 4.0 3.4*   CO2 26   < > 25 26 24 26   CL 92*   < > 95 94* 93* 93*   BUN 19   < > 21* 21* 21* 20   CREATININE 1.1   < > 1.3 1.0 1.3 1.2   ALKPHOS 215*  --  221*  --   --  230*   ALT 13  --  11  --   --  8*   AST 24  --  31  --   --  31   BILITOT 2.4*  --  2.0*  --   --  1.9*    < > = values in this interval not displayed.      Coagulation:   Recent Labs   Lab 08/31/24 0244 09/01/24  0234 09/02/24  0205   INR 2.5* 2.4* 2.8*   APTT 39.0* 38.8* 40.9*     LDH:  Recent Labs   Lab 08/31/24 0244  09/01/24  0234 09/02/24  0205   * 316* 323*     Microbiology:  Microbiology Results (last 7 days)       Procedure Component Value Units Date/Time    Culture, Anaerobe [[id number]] Collected: 08/27/24 1446    Order Status: Completed Specimen: Skin from Abdomen Updated: 09/02/24 0936     Anaerobic Culture No anaerobes isolated    Narrative:      Driveline site    Blood culture [[id number]]  (Abnormal) Collected: 08/30/24 1152    Order Status: Completed Specimen: Blood Updated: 09/02/24 0802     Blood Culture, Routine Gram stain dudley bottle: yeast      Results called to and read back by: Rufino 09/01/2024  02:03      YEAST   Identification pending  ID consult required at [facility] and [facility].      Narrative:      Lft AC    Blood culture [[id number]] Collected: 09/01/24 0939    Order Status: Completed Specimen: Blood Updated: 09/01/24 1915     Blood Culture, Routine No Growth to date    Blood culture [[id number]] Collected: 09/01/24 0939    Order Status: Completed Specimen: Blood Updated: 09/01/24 1915     Blood Culture, Routine No Growth to date    Blood culture [[id number]] Collected: 08/30/24 1152    Order Status: Completed Specimen: Blood Updated: 09/01/24 1412     Blood Culture, Routine No Growth to date      No Growth to date      No Growth to date    Narrative:      Rt AC    Blood culture [[id number]]  (Abnormal) Collected: 08/27/24 1751    Order Status: Completed Specimen: Blood Updated: 09/01/24 0951     Blood Culture, Routine Gram stain dudley bottle: Gram positive cocci in clusters resembling Staph      Results called to and read back by: Lorri Riggs RN  08/29/2024   13:50      Gram stain aer bottle: yeast      Results called to and read back by:Lorri Riggs RN 08/30/2024      STAPHYLOCOCCUS AUREUS  ID consult required at [facility] and [facility].  For susceptibility see order #[id number]      Narrative:      Blood culture # 2 different location.    Blood culture  [[id number]] Collected: 08/28/24 2226    Order Status: Completed Specimen: Blood Updated: 09/01/24 0947     Blood Culture, Routine Gram stain aer bottle: yeast      Gram stain dudley bottle: yeast      Positive results previously called 08/30/2024    Narrative:      Code 61989  Draw sample # 2 from separate site    Blood culture [[id number]] Collected: 08/28/24 2226    Order Status: Completed Specimen: Blood Updated: 09/01/24 0946     Blood Culture, Routine Gram stain aer bottle: yeast      Results called to and read back by:Lorri Riggs RN 08/30/2024  17:10    Narrative:      Code 92816  Draw sample # 2 from separate site    Blood culture [[id number]]  (Abnormal)  (Susceptibility) Collected: 08/27/24 1802    Order Status: Completed Specimen: Blood Updated: 08/31/24 1049     Blood Culture, Routine Gram stain aer bottle: Gram positive cocci in clusters resembling Staph      Results called to and read back by:Miguelina Thomas RN 08/28/2024  22:59      MICROCOCCUS SPECIES  Organism is a probable contaminant        STAPHYLOCOCCUS AUREUS  ID consult required at [facility] and [facility].      Rapid Organism ID by PCR (from Blood culture) [[id number]] Collected: 08/28/24 2226    Order Status: Completed Updated: 08/30/24 1823     Enterococcus faecalis Not Detected     Enterococcus faecium Not Detected     Listeria monocytogenes Not Detected     Staphylococcus spp. Not Detected     Staphylococcus aureus Not Detected     Staphylococcus epidermidis Not Detected     Staphylococcus lugdunensis Not Detected     Streptococcus species Not Detected     Streptococcus agalactiae Not Detected     Streptococcus pneumoniae Not Detected     Streptococcus pyogenes Not Detected     Acinetobacter calcoaceticus/baumannii complex Not Detected     Bacteroides fragilis Not Detected     Enterobacterales Not Detected     Enterobacter cloacae complex Not Detected     Escherichia coli Not Detected     Klebsiella aerogenes Not Detected      Klebsiella oxytoca Not Detected     Klebsiella pneumoniae group Not Detected     Proteus Not Detected     Salmonella sp Not Detected     Serratia marcescens Not Detected     Haemophilus influenzae Not Detected     Neisseria meningtidis Not Detected     Pseudomonas aeruginosa Not Detected     Stenotrophomonas maltophilia Not Detected     Candida albicans Not Detected     Candida auris Not Detected     Candida glabrata Not Detected     Candida krusei Not Detected     Candida parapsilosis Not Detected     Candida tropicalis Not Detected     Cryptococcus neoformans/gattii Not Detected     CTX-M (ESBL ) Test Not Applicable     IMP (Carbapenem resistant) Test Not Applicable     KPC resistance gene (Carbapenem resistant) Test Not Applicable     mcr-1  Test Not Applicable     mec A/C  Test Not Applicable     mec A/C and MREJ (MRSA) gene Test Not Applicable     NDM (Carbapenem resistant) Test Not Applicable     OXA-48-like (Carbapenem resistant) Test Not Applicable     van A/B (VRE gene) Test Not Applicable     VIM (Carbapenem resistant) Test Not Applicable    Narrative:      Code 91085  Draw sample # 2 from separate site    Culture, Anaerobe [[id number]] Collected: 08/25/24 1733    Order Status: Completed Specimen: Wound from Abdomen Updated: 08/30/24 1405     Anaerobic Culture No anaerobes isolated    Narrative:      Drive line exit site    Aerobic culture [[id number]]  (Abnormal)  (Susceptibility) Collected: 08/27/24 1446    Order Status: Completed Specimen: Skin from Abdomen Updated: 08/29/24 1424     Aerobic Bacterial Culture STAPHYLOCOCCUS AUREUS  Rare      Narrative:      Driveline site    MRSA/SA Rapid ID by PCR from Blood culture [[id number]]  (Abnormal) Collected: 08/27/24 1802    Order Status: Completed Updated: 08/29/24 0027     Staph aureus ID by PCR Positive     Methicillin Resistant ID by PCR Negative    Blood culture [[id number]]     Order Status: Canceled Specimen: Blood     Blood culture  [[id number]]     Order Status: Canceled Specimen: Blood             I have reviewed all pertinent labs within the past 24 hours.    Estimated Creatinine Clearance: 80.1 mL/min (based on SCr of 1.2 mg/dL).    Diagnostic Results:  I have reviewed and interpreted all pertinent imaging results/findings within the past 24 hours.

## 2024-09-02 NOTE — NURSING
Pt's VAD dressing changed per protocol using kit and sterile technique. Pt's drive line site is moist dry, intact with small purulent yellow  drainage at site and old dressing; DLES is + (2). No kinks or frays on drive line, secured with melendez anchor. Pt tolerated dressing change well. Next dressing change due 09/03/2024.

## 2024-09-02 NOTE — ASSESSMENT & PLAN NOTE
Updated 8/26/24:  EF 5-10% global hypokinesis, RV enlargement and global hypokinesis, biatrial enlargement, mild-to-moderate MR, severe TR, no van HeartMate 3 in place, aortic valve does not open, LVEDd 7.2  Previous TTE 9/5/23 with EF 10-15%, mod to sever TR, LVEDd 7.11, LVAD - HM3 in place    -continue on bumex 2mg BID, cont inpatient treatment for bacteremia and fungemia   -GDMT: d/c spironolactone given possible gynecomastia, transition to epleronone   -Previously on home Primacor 0.25 at home but ran out approx 5 days prior to admission and also reports he occasionally disconnects himself from his pump to perform certain activities. Will plan to d/c off primacor   - TTE With LVEF 5-10%, poor RV function, dilation, no signs of endocarditis on TTE   -palliative care c/s and pt discussed with service, appreciate f/up- plan for continued follow up with palliative outpatient as well with Dr. Carr   -repeated [facility] discussion on 8/31- full code, aggressive care, pt educated about compliance going forward - re-iterated. Pt understanding, apologetic for previous noncompliance. Pt understands he is not a candidate for heart transplant and will not be evaluated due to his noncompliance.

## 2024-09-02 NOTE — PROGRESS NOTES
[facility] - Cardiology Stepdown  Infectious Disease  Progress Note    Patient Name: Kevan Queen  MRN: [id number]  Admission Date: 8/25/2024  Length of Stay: 8 days  Attending Physician: Dalia Crum MD  Primary Care Provider: Rosio Armendariz FNP    Isolation Status: No active isolations  Assessment/Plan:      Cardiac/Vascular  * LVAD (left ventricular assist device) present  MSSA DLESI  Bacteremia  Pyelnephritis/renal abscess  Fungemia    38 yo male with LVAD HM3 as DT, prior MSSA DLESI/bacteremia c/b empyema (on suppressive cefadroxil) and eroded ICD/pocket infection s/p removal, seizure admitted for back pain. Hospital course notable for blcx with micrococcus (suspect contaminant), MSSA bacteremia (suspect due to DLES), and fungemia (suspect 2/2 to prior picc). DLES also with MSSA.  CT with contrast now with irregular nonenhancing renal lesion c/f pyelonephritis or potential abscess, R chest lesion c/f gynecomastia. Old PICC pulled 8/29. TTE without IE. S/p ophthalmology evaluation, no evidence of endophthalmitis. CT spine without evidence of OM/discitis.     Recommendations:  -cefazolin 2g q8hr IV   -micafungin 100 mg iv daily  -f/u BCx 9/1 if still positive will have to consider GUILLERMO  -f/u ID of yeast in blood                 Anticipated Disposition: tbd    Thank you for your consult. I will follow-up with patient. Please contact us if you have any additional questions.    Kavya Carrion MD  Infectious Disease  [facility] - Cardiology Stepdown    Subjective:     Principal Problem:LVAD (left ventricular assist device) present    HPI: 38 yo male with LVAD HM3 as DT, prior MSSA DLESI/bacteremia c/b empyema (on suppressive cefadroxil) and eroded ICD/pocket infection s/p removal, seizure admitted for back pain. ID consulted for abx recs. Hospital course notable for US of chest with hypoechoic area of unclear significance. DLES cx in process. Pt reported adherence to cefadroxil - denied issues  with it. Denied fevers, chills, abdominal complaints or worsening drainage from DLES. Pt reported that he ran out of his primacor and had some back pain soon after. Denied problems with PICC. Reported swelling under R breast - pt states he is unclear how long he's had the swelling.            Interval History: Remained afebrile overnight.no new complaints.    Review of Systems   Constitutional:  Negative for chills and fever.   Eyes:  Negative for visual disturbance.   Genitourinary:  Negative for difficulty urinating and dysuria.   Musculoskeletal:  Positive for back pain.   All other systems reviewed and are negative.    Objective:     Vital Signs (Most Recent):  Temp: 97.5 °F (36.4 °C) (09/02/24 0755)  Pulse: 82 (09/02/24 0755)  Resp: 18 (09/02/24 0755)  BP: 105/72 (09/02/24 0758)  SpO2: 100 % (09/02/24 0755) Vital Signs (24h Range):  Temp:  [97.5 °F (36.4 °C)-98.5 °F (36.9 °C)] 97.5 °F (36.4 °C)  Pulse:  [74-97] 82  Resp:  [18] 18  SpO2:  [95 %-100 %] 100 %  BP: ()/(0-78) 105/72     Weight:  (pt.state he was in pain his back and bed scale doesn't work.)  Body mass index is 18.88 kg/m².    Estimated Creatinine Clearance: 80.1 mL/min (based on SCr of 1.2 mg/dL).     Physical Exam  Constitutional:       General: He is not in acute distress.     Appearance: He is not ill-appearing or toxic-appearing.   Eyes:      General:         Right eye: No discharge.         Left eye: No discharge.   Cardiovascular:      Comments: LVAD hum present  Pulmonary:      Effort: Pulmonary effort is normal. No respiratory distress.   Abdominal:      General: There is no distension.      Palpations: Abdomen is soft.      Tenderness: There is no right CVA tenderness or left CVA tenderness.      Comments: LVAD dressed   Musculoskeletal:         General: Tenderness (lower back pain) present.   Skin:     General: Skin is warm and dry.   Neurological:      Mental Status: He is alert and oriented to person, place, and time.           Significant Labs: Blood Culture:   Recent Labs   Lab 08/27/24  1751 08/27/24  1802 08/28/24  2226 08/30/24  1152 09/01/24  0939   LABBLOO Gram stain dudley bottle: Gram positive cocci in clusters resembling Staph  Results called to and read back by: Lorri Riggs RN  08/29/2024   13:50  Gram stain aer bottle: yeast  Results called to and read back by:Lorri Riggs RN 08/30/2024  STAPHYLOCOCCUS AUREUS  ID consult required at [facility] and [facility].  For susceptibility see order #[id number]  * Gram stain aer bottle: Gram positive cocci in clusters resembling Staph  Results called to and read back by:Miguelina Thomas RN 08/28/2024  22:59  MICROCOCCUS SPECIES  Organism is a probable contaminant  *  STAPHYLOCOCCUS AUREUS  ID consult required at [facility] and [facility].  * Gram stain aer bottle: yeast  Gram stain dudley bottle: yeast  Positive results previously called 08/30/2024  Gram stain aer bottle: yeast  Results called to and read back by:Lorri Riggs RN 08/30/2024  17:10 Gram stain dudley bottle: yeast  Results called to and read back by: Rufino 09/01/2024  02:03  YEAST   Identification pending  ID consult required at [facility] and [facility].  *  No Growth to date  No Growth to date  No Growth to date No Growth to date  No Growth to date     All pertinent labs within the past 24 hours have been reviewed.    Significant Imaging: I have reviewed all pertinent imaging results/findings within the past 24 hours.

## 2024-09-02 NOTE — NURSING
Notified  pt c/o back pain 10/10 now, Oxycodone given around 1630, and 1000 mg tylenol ordered and Ob wants to give cyclobenzaprine now instead 2100. Med given.

## 2024-09-02 NOTE — ASSESSMENT & PLAN NOTE
-Goal INR 2-3 , INR 4.7 on admission - improved  - pharmacy  to manage coumadin dosing, cont warfarin   Check INR daily     Lab Results   Component Value Date    INR 2.8 (H) 09/02/2024    INR 2.4 (H) 09/01/2024    INR 2.5 (H) 08/31/2024

## 2024-09-02 NOTE — PLAN OF CARE
K and mag replaced during the day. VSS. BG monitored. LVAD number and doppler WNL. LVAD dressing changed per protocol ( see note). Pt educated on fall risk and remained free from falls/trauma/injury. Denies chest pain, SOB, palpitations, dizziness, pain, or discomfort. Plan of care reviewed with pt, all questions answered. Bed locked in lowest position, call bell within reach, no acute distress noted, will continue to monitor.

## 2024-09-02 NOTE — ASSESSMENT & PLAN NOTE
Pt reports initial pain following running out of primacor. S/p spinal Xrays without significant abnormality besides degenerative disc disease.  NO red flag symptoms on admission  CT spine cervical, thoracic and lumbar negative for discitis/infectious concern and renal collection/pyelo appears to be unchanged.     Plan:  - Multimodal pain control  - titrate to patient's response  - C/w home gabapentin, tylenol prn and lidocaine patches.   - C/w Prn oxycodone added  - has not been using   - Schedule muscle relaxant overnight and assess for change in symptoms.   - Back pain more pronounced on R side, most likely cause is his pyelonephritis  -c/w abx for pyelonephritis

## 2024-09-02 NOTE — PROGRESS NOTES
09/02/24 1600 09/02/24 1621 09/02/24 1622   Vital Signs   BP (!) 84/0 123/77 (!) 86/0   MAP (mmHg)  --  92  --    BP Location Left arm Left arm Left arm   BP Method Doppler Automatic Doppler   Patient Position Lying Lying Lying      09/02/24 1623   Vital Signs   BP 98/78   MAP (mmHg) 85   BP Location Left arm   BP Method Automatic   Patient Position  --      Notified George.MD BP was 123/77, MAP 92, asymptomatic, recheck BP 98/78, MAP 85, doppler 86. No new order received, WCTM.

## 2024-09-02 NOTE — SUBJECTIVE & OBJECTIVE
Interval History: Remained afebrile overnight.no new complaints.    Review of Systems   Constitutional:  Negative for chills and fever.   Eyes:  Negative for visual disturbance.   Genitourinary:  Negative for difficulty urinating and dysuria.   Musculoskeletal:  Positive for back pain.   All other systems reviewed and are negative.    Objective:     Vital Signs (Most Recent):  Temp: 97.5 °F (36.4 °C) (09/02/24 0755)  Pulse: 82 (09/02/24 0755)  Resp: 18 (09/02/24 0755)  BP: 105/72 (09/02/24 0758)  SpO2: 100 % (09/02/24 0755) Vital Signs (24h Range):  Temp:  [97.5 °F (36.4 °C)-98.5 °F (36.9 °C)] 97.5 °F (36.4 °C)  Pulse:  [74-97] 82  Resp:  [18] 18  SpO2:  [95 %-100 %] 100 %  BP: ()/(0-78) 105/72     Weight:  (pt.state he was in pain his back and bed scale doesn't work.)  Body mass index is 18.88 kg/m².    Estimated Creatinine Clearance: 80.1 mL/min (based on SCr of 1.2 mg/dL).     Physical Exam  Constitutional:       General: He is not in acute distress.     Appearance: He is not ill-appearing or toxic-appearing.   Eyes:      General:         Right eye: No discharge.         Left eye: No discharge.   Cardiovascular:      Comments: LVAD hum present  Pulmonary:      Effort: Pulmonary effort is normal. No respiratory distress.   Abdominal:      General: There is no distension.      Palpations: Abdomen is soft.      Tenderness: There is no right CVA tenderness or left CVA tenderness.      Comments: LVAD dressed   Musculoskeletal:         General: Tenderness (lower back pain) present.   Skin:     General: Skin is warm and dry.   Neurological:      Mental Status: He is alert and oriented to person, place, and time.          Significant Labs: Blood Culture:   Recent Labs   Lab 08/27/24  1751 08/27/24  1802 08/28/24  2226 08/30/24  1152 09/01/24  0939   LABBLOO Gram stain dudley bottle: Gram positive cocci in clusters resembling Staph  Results called to and read back by: Lorri Riggs RN  08/29/2024   13:50  Gram stain  aer bottle: yeast  Results called to and read back by:Lorri Riggs RN 08/30/2024  STAPHYLOCOCCUS AUREUS  ID consult required at [facility].  For susceptibility see order #[id number]  * Gram stain aer bottle: Gram positive cocci in clusters resembling Staph  Results called to and read back by:Miguelina Thomas RN 08/28/2024  22:59  MICROCOCCUS SPECIES  Organism is a probable contaminant  *  STAPHYLOCOCCUS AUREUS  ID consult required at [facility].  * Gram stain aer bottle: yeast  Gram stain dudley bottle: yeast  Positive results previously called 08/30/2024  Gram stain aer bottle: yeast  Results called to and read back by:Lorri Riggs RN 08/30/2024  17:10 Gram stain dudley bottle: yeast  Results called to and read back by: Rufino 09/01/2024  02:03  YEAST   Identification pending  ID consult required at [facility].  *  No Growth to date  No Growth to date  No Growth to date No Growth to date  No Growth to date     All pertinent labs within the past 24 hours have been reviewed.    Significant Imaging: I have reviewed all pertinent imaging results/findings within the past 24 hours.

## 2024-09-02 NOTE — PROGRESS NOTES
09/02/2024  Mirela Perez    Current provider:  Dalia Crum MD    Device interrogation:      9/2/2024    12:00 PM 9/2/2024     8:00 AM 9/2/2024     3:52 AM 9/1/2024    11:53 PM 9/1/2024     7:46 PM 9/1/2024     4:18 PM 9/1/2024    11:26 AM   TXP LVAD INTERROGATIONS   Type HeartMate3 HeartMate3 HeartMate3 HeartMate3 HeartMate3 HeartMate3 HeartMate3   Flow 3.8 4.1 4.1 3.9 4.1 4.1 3.8   Speed 5100 5100 5100 5100 5100 5100 5100   PI 6.5 5.9 6.1 5.9 5.3 6.1 6.2   Power (Serna) 3.6 3.6 3.6 3.6 3.6 3.6 3.6   LSL 4700 4700 4700 4700 4700 4700 4700   Pulsatility Pulse Pulse  No Pulse Pulse            Rounded on Kevan Queen to ensure all mechanical assist device settings (IABP or VAD) were appropriate and all parameters were within limits.  I was able to ensure all back up equipment was present, the staff had no issues, and the Perfusion Department daily rounding was complete.      For implantable VADs: Interrogation of Ventricular assist device was performed with analysis of device parameters and review of device function. I have personally reviewed the interrogation findings and agree with findings as stated.     In emergency, the nursing units have been notified to contact the perfusion department either by:  Calling [phone] from 630am to 4pm Mon thru Fri, utilizing the On-Call Finder functionality of Epic and searching for Perfusion, or by contacting the hospital  from 4pm to 630am and on weekends and asking to speak with the perfusionist on call.    3:21 PM

## 2024-09-03 LAB
ANION GAP SERPL CALC-SCNC: 10 MMOL/L (ref 8–16)
APTT PPP: 41.5 SEC (ref 21–32)
BASOPHILS # BLD AUTO: 0.04 K/UL (ref 0–0.2)
BASOPHILS NFR BLD: 0.4 % (ref 0–1.9)
BUN SERPL-MCNC: 22 MG/DL (ref 6–20)
CALCIUM SERPL-MCNC: 9.9 MG/DL (ref 8.7–10.5)
CHLORIDE SERPL-SCNC: 91 MMOL/L (ref 95–110)
CO2 SERPL-SCNC: 27 MMOL/L (ref 23–29)
CREAT SERPL-MCNC: 1.2 MG/DL (ref 0.5–1.4)
DIFFERENTIAL METHOD BLD: ABNORMAL
EOSINOPHIL # BLD AUTO: 0.1 K/UL (ref 0–0.5)
EOSINOPHIL NFR BLD: 0.6 % (ref 0–8)
ERYTHROCYTE [DISTWIDTH] IN BLOOD BY AUTOMATED COUNT: 27 % (ref 11.5–14.5)
EST. GFR  (NO RACE VARIABLE): >60 ML/MIN/1.73 M^2
GLUCOSE SERPL-MCNC: 143 MG/DL (ref 70–110)
HCT VFR BLD AUTO: 26.9 % (ref 40–54)
HGB BLD-MCNC: 7.8 G/DL (ref 14–18)
IMM GRANULOCYTES # BLD AUTO: 0.05 K/UL (ref 0–0.04)
IMM GRANULOCYTES NFR BLD AUTO: 0.5 % (ref 0–0.5)
INR PPP: 3.3 (ref 0.8–1.2)
LDH SERPL L TO P-CCNC: 300 U/L (ref 110–260)
LYMPHOCYTES # BLD AUTO: 1.3 K/UL (ref 1–4.8)
LYMPHOCYTES NFR BLD: 13.6 % (ref 18–48)
MAGNESIUM SERPL-MCNC: 1.9 MG/DL (ref 1.6–2.6)
MCH RBC QN AUTO: 19.3 PG (ref 27–31)
MCHC RBC AUTO-ENTMCNC: 29 G/DL (ref 32–36)
MCV RBC AUTO: 67 FL (ref 82–98)
MONOCYTES # BLD AUTO: 1 K/UL (ref 0.3–1)
MONOCYTES NFR BLD: 9.9 % (ref 4–15)
NEUTROPHILS # BLD AUTO: 7.3 K/UL (ref 1.8–7.7)
NEUTROPHILS NFR BLD: 75 % (ref 38–73)
NRBC BLD-RTO: 1 /100 WBC
PHOSPHATE SERPL-MCNC: 3.7 MG/DL (ref 2.7–4.5)
PLATELET # BLD AUTO: 602 K/UL (ref 150–450)
PMV BLD AUTO: 9.9 FL (ref 9.2–12.9)
POCT GLUCOSE: 115 MG/DL (ref 70–110)
POCT GLUCOSE: 156 MG/DL (ref 70–110)
POCT GLUCOSE: 185 MG/DL (ref 70–110)
POCT GLUCOSE: 223 MG/DL (ref 70–110)
POTASSIUM SERPL-SCNC: 4.9 MMOL/L (ref 3.5–5.1)
PROTHROMBIN TIME: 33.3 SEC (ref 9–12.5)
RBC # BLD AUTO: 4.04 M/UL (ref 4.6–6.2)
SODIUM SERPL-SCNC: 128 MMOL/L (ref 136–145)
WBC # BLD AUTO: 9.79 K/UL (ref 3.9–12.7)

## 2024-09-03 PROCEDURE — 36415 COLL VENOUS BLD VENIPUNCTURE: CPT | Performed by: STUDENT IN AN ORGANIZED HEALTH CARE EDUCATION/TRAINING PROGRAM

## 2024-09-03 PROCEDURE — 25000003 PHARM REV CODE 250: Performed by: STUDENT IN AN ORGANIZED HEALTH CARE EDUCATION/TRAINING PROGRAM

## 2024-09-03 PROCEDURE — 25000003 PHARM REV CODE 250: Performed by: INTERNAL MEDICINE

## 2024-09-03 PROCEDURE — 27000248 HC VAD-ADDITIONAL DAY

## 2024-09-03 PROCEDURE — 80048 BASIC METABOLIC PNL TOTAL CA: CPT | Performed by: STUDENT IN AN ORGANIZED HEALTH CARE EDUCATION/TRAINING PROGRAM

## 2024-09-03 PROCEDURE — 99233 SBSQ HOSP IP/OBS HIGH 50: CPT | Mod: ,,, | Performed by: PHYSICIAN ASSISTANT

## 2024-09-03 PROCEDURE — 83735 ASSAY OF MAGNESIUM: CPT | Performed by: STUDENT IN AN ORGANIZED HEALTH CARE EDUCATION/TRAINING PROGRAM

## 2024-09-03 PROCEDURE — 25000003 PHARM REV CODE 250: Performed by: PHYSICIAN ASSISTANT

## 2024-09-03 PROCEDURE — 84100 ASSAY OF PHOSPHORUS: CPT | Performed by: STUDENT IN AN ORGANIZED HEALTH CARE EDUCATION/TRAINING PROGRAM

## 2024-09-03 PROCEDURE — 63600175 PHARM REV CODE 636 W HCPCS: Performed by: STUDENT IN AN ORGANIZED HEALTH CARE EDUCATION/TRAINING PROGRAM

## 2024-09-03 PROCEDURE — 63600175 PHARM REV CODE 636 W HCPCS: Performed by: INTERNAL MEDICINE

## 2024-09-03 PROCEDURE — 85730 THROMBOPLASTIN TIME PARTIAL: CPT | Performed by: STUDENT IN AN ORGANIZED HEALTH CARE EDUCATION/TRAINING PROGRAM

## 2024-09-03 PROCEDURE — 20600001 HC STEP DOWN PRIVATE ROOM

## 2024-09-03 PROCEDURE — 93750 INTERROGATION VAD IN PERSON: CPT | Mod: ,,, | Performed by: INTERNAL MEDICINE

## 2024-09-03 PROCEDURE — 85025 COMPLETE CBC W/AUTO DIFF WBC: CPT | Performed by: STUDENT IN AN ORGANIZED HEALTH CARE EDUCATION/TRAINING PROGRAM

## 2024-09-03 PROCEDURE — 99233 SBSQ HOSP IP/OBS HIGH 50: CPT | Mod: ,,, | Performed by: INTERNAL MEDICINE

## 2024-09-03 PROCEDURE — 85610 PROTHROMBIN TIME: CPT | Performed by: STUDENT IN AN ORGANIZED HEALTH CARE EDUCATION/TRAINING PROGRAM

## 2024-09-03 PROCEDURE — 83615 LACTATE (LD) (LDH) ENZYME: CPT | Performed by: STUDENT IN AN ORGANIZED HEALTH CARE EDUCATION/TRAINING PROGRAM

## 2024-09-03 PROCEDURE — 25000003 PHARM REV CODE 250

## 2024-09-03 PROCEDURE — 99232 SBSQ HOSP IP/OBS MODERATE 35: CPT | Mod: ,,, | Performed by: NURSE PRACTITIONER

## 2024-09-03 RX ORDER — CYCLOBENZAPRINE HCL 5 MG
5 TABLET ORAL 2 TIMES DAILY
Status: DISCONTINUED | OUTPATIENT
Start: 2024-09-03 | End: 2024-09-10 | Stop reason: HOSPADM

## 2024-09-03 RX ORDER — FLUCONAZOLE 2 MG/ML
400 INJECTION, SOLUTION INTRAVENOUS ONCE
Status: COMPLETED | OUTPATIENT
Start: 2024-09-04 | End: 2024-09-04

## 2024-09-03 RX ADMIN — AMIODARONE HYDROCHLORIDE 400 MG: 200 TABLET ORAL at 08:09

## 2024-09-03 RX ADMIN — BUMETANIDE 2 MG: 1 TABLET ORAL at 08:09

## 2024-09-03 RX ADMIN — GABAPENTIN 100 MG: 100 CAPSULE ORAL at 11:09

## 2024-09-03 RX ADMIN — LEVETIRACETAM 1500 MG: 500 TABLET, FILM COATED ORAL at 10:09

## 2024-09-03 RX ADMIN — INSULIN ASPART 4 UNITS: 100 INJECTION, SOLUTION INTRAVENOUS; SUBCUTANEOUS at 04:09

## 2024-09-03 RX ADMIN — FLUCONAZOLE 800 MG: 2 INJECTION, SOLUTION INTRAVENOUS at 02:09

## 2024-09-03 RX ADMIN — AMLODIPINE BESYLATE 5 MG: 5 TABLET ORAL at 08:09

## 2024-09-03 RX ADMIN — INSULIN ASPART 10 UNITS: 100 INJECTION, SOLUTION INTRAVENOUS; SUBCUTANEOUS at 08:09

## 2024-09-03 RX ADMIN — INSULIN ASPART 10 UNITS: 100 INJECTION, SOLUTION INTRAVENOUS; SUBCUTANEOUS at 11:09

## 2024-09-03 RX ADMIN — DULOXETINE HYDROCHLORIDE 30 MG: 30 CAPSULE, DELAYED RELEASE ORAL at 08:09

## 2024-09-03 RX ADMIN — INSULIN ASPART 2 UNITS: 100 INJECTION, SOLUTION INTRAVENOUS; SUBCUTANEOUS at 08:09

## 2024-09-03 RX ADMIN — PANTOPRAZOLE SODIUM 40 MG: 40 TABLET, DELAYED RELEASE ORAL at 08:09

## 2024-09-03 RX ADMIN — OXYCODONE HYDROCHLORIDE 10 MG: 10 TABLET, FILM COATED, EXTENDED RELEASE ORAL at 08:09

## 2024-09-03 RX ADMIN — CEFAZOLIN 2 G: 2 INJECTION, POWDER, FOR SOLUTION INTRAMUSCULAR; INTRAVENOUS at 10:09

## 2024-09-03 RX ADMIN — ACETAMINOPHEN 650 MG: 325 TABLET ORAL at 04:09

## 2024-09-03 RX ADMIN — AMIODARONE HYDROCHLORIDE 400 MG: 200 TABLET ORAL at 10:09

## 2024-09-03 RX ADMIN — CEFAZOLIN 2 G: 2 INJECTION, POWDER, FOR SOLUTION INTRAMUSCULAR; INTRAVENOUS at 04:09

## 2024-09-03 RX ADMIN — LEVETIRACETAM 1500 MG: 500 TABLET, FILM COATED ORAL at 08:09

## 2024-09-03 RX ADMIN — BUMETANIDE 2 MG: 1 TABLET ORAL at 04:09

## 2024-09-03 RX ADMIN — INSULIN ASPART 2 UNITS: 100 INJECTION, SOLUTION INTRAVENOUS; SUBCUTANEOUS at 11:09

## 2024-09-03 RX ADMIN — Medication 400 MG: at 08:09

## 2024-09-03 RX ADMIN — GABAPENTIN 100 MG: 100 CAPSULE ORAL at 08:09

## 2024-09-03 RX ADMIN — EPLERENONE 25 MG: 25 TABLET, FILM COATED ORAL at 08:09

## 2024-09-03 RX ADMIN — ASPIRIN 81 MG: 81 TABLET, COATED ORAL at 08:09

## 2024-09-03 RX ADMIN — GABAPENTIN 400 MG: 400 CAPSULE ORAL at 10:09

## 2024-09-03 RX ADMIN — CYCLOBENZAPRINE HYDROCHLORIDE 5 MG: 5 TABLET, FILM COATED ORAL at 04:09

## 2024-09-03 RX ADMIN — WARFARIN SODIUM 2 MG: 2 TABLET ORAL at 04:09

## 2024-09-03 RX ADMIN — ATORVASTATIN CALCIUM 40 MG: 20 TABLET, FILM COATED ORAL at 08:09

## 2024-09-03 RX ADMIN — CEFAZOLIN 2 G: 2 INJECTION, POWDER, FOR SOLUTION INTRAMUSCULAR; INTRAVENOUS at 11:09

## 2024-09-03 RX ADMIN — INSULIN ASPART 10 UNITS: 100 INJECTION, SOLUTION INTRAVENOUS; SUBCUTANEOUS at 04:09

## 2024-09-03 RX ADMIN — INSULIN GLARGINE 14 UNITS: 100 INJECTION, SOLUTION SUBCUTANEOUS at 08:09

## 2024-09-03 RX ADMIN — INSULIN GLARGINE 14 UNITS: 100 INJECTION, SOLUTION SUBCUTANEOUS at 10:09

## 2024-09-03 NOTE — PROGRESS NOTES
[facility] - Cardiology Stepdown  Infectious Disease  Progress Note    Patient Name: Kevan Queen  MRN: [id number]  Admission Date: 8/25/2024  Length of Stay: 9 days  Attending Physician: Natalya Villatoro MD  Primary Care Provider: Rosio Armendariz FNP    Isolation Status: No active isolations  Assessment/Plan:      Cardiac/Vascular  * LVAD (left ventricular assist device) present  MSSA DLESI  Bacteremia  Pyelnephritis/renal abscess  Fungemia    40 yo male with LVAD HM3 as DT, prior MSSA DLESI/bacteremia c/b empyema (on suppressive cefadroxil) and eroded ICD/pocket infection s/p removal, seizure admitted for back pain. Hospital course notable for blcx with micrococcus (suspect contaminant), MSSA bacteremia (suspect due to DLES), and c parapsilosis fungemia (suspect 2/2 to prior picc vs pyelo). DLES also with MSSA.  CT with contrast now with irregular nonenhancing renal lesion c/f pyelonephritis or potential abscess, R chest lesion c/f gynecomastia. Old PICC pulled 8/29. TTE without IE. S/p ophthalmology evaluation, no evidence of endophthalmitis. CT spine without evidence of OM/discitis.     BCx 9/1 still growing yeast. Concern persistent fungemia is due to antifungal resistance vs inadequate source control.    Recommendations:  -continue cefazolin 2g q8hr IV to target MSSA  -switch micafungin to fluconazole  -monitor for QTc prolongation  -f/u susceptibilities of c parapsilosis  -GUILLERMO to eval for endocarditis given persistent fungemia  -f/u ID of yeast in blood cx 9/1  -repeat BCx with AM labs to assess for clearance       A/p discussed with primary team.          Anticipated Disposition: tbd    Thank you for your consult. I will follow-up with patient. Please contact us if you have any additional questions.    Kavya Carrion MD  Infectious Disease  [facility] - Cardiology Stepdown    Subjective:     Principal Problem:LVAD (left ventricular assist device) present    HPI: 40 yo male with LVAD HM3 as  DT, prior MSSA DLESI/bacteremia c/b empyema (on suppressive cefadroxil) and eroded ICD/pocket infection s/p removal, seizure admitted for back pain. ID consulted for abx recs. Hospital course notable for US of chest with hypoechoic area of unclear significance. DLES cx in process. Pt reported adherence to cefadroxil - denied issues with it. Denied fevers, chills, abdominal complaints or worsening drainage from DLES. Pt reported that he ran out of his primacor and had some back pain soon after. Denied problems with PICC. Reported swelling under R breast - pt states he is unclear how long he's had the swelling.            No new subjective & objective note has been filed under this hospital service since the last note was generated.

## 2024-09-03 NOTE — PROGRESS NOTES
09/03/2024  Michael Montes    Current provider:  Dalia Crum MD    Device interrogation:      9/3/2024     4:23 AM 9/2/2024    11:28 PM 9/2/2024     9:10 PM 9/2/2024     4:00 PM 9/2/2024    12:00 PM 9/2/2024     8:00 AM 9/2/2024     3:52 AM   TXP LVAD INTERROGATIONS   Type HeartMate3  HeartMate3 HeartMate3 HeartMate3 HeartMate3 HeartMate3   Flow 4 3.8 3.9 4 3.8 4.1 4.1   Speed 5100 5100 5100 5100 5100 5100 5100   PI 5.9 6.4 6.3 6.5 6.5 5.9 6.1   Power (Serna) 3.5 3.7 3.5 3.7 3.6 3.6 3.6   LSL 4700 4700 4700 4700 4700 4700 4700   Pulsatility No Pulse Pulse  Pulse Pulse Pulse           Rounded on Kevan Queen to ensure all mechanical assist device settings (IABP or VAD) were appropriate and all parameters were within limits.  I was able to ensure all back up equipment was present, the staff had no issues, and the Perfusion Department daily rounding was complete.      For implantable VADs: Interrogation of Ventricular assist device was performed with analysis of device parameters and review of device function. I have personally reviewed the interrogation findings and agree with findings as stated.     In emergency, the nursing units have been notified to contact the perfusion department either by:  Calling [phone] from 630am to 4pm Mon thru Fri, utilizing the On-Call Finder functionality of Epic and searching for Perfusion, or by contacting the hospital  from 4pm to 630am and on weekends and asking to speak with the perfusionist on call.    8:03 AM

## 2024-09-03 NOTE — SUBJECTIVE & OBJECTIVE
"Interval HPI:   Overnight events: Remains in CSU. BG better controlled today on current SQ insulin regimen. No hypoglycemia noted. Diet diabetic Cardiac (Low Na/Chol); 2000 Calorie; Fluid - 1500mL; Standard Tray    Eatin%  Nausea: No  Hypoglycemia and intervention: No  Fever: No  TPN and/or TF: No  If yes, type of TF/TPN and rate: n/a    BP 95/70 (BP Location: Left arm, Patient Position: Lying)   Pulse 82   Temp 97.7 °F (36.5 °C) (Oral)   Resp 18   Ht 6' 3" (1.905 m)   Wt 74.3 kg (163 lb 12.8 oz)   SpO2 96%   BMI 20.47 kg/m²     Labs Reviewed and Include    Recent Labs   Lab 24  0334   *   CALCIUM 9.9   *   K 4.9   CO2 27   CL 91*   BUN 22*   CREATININE 1.2     Lab Results   Component Value Date    WBC 9.79 2024    HGB 7.8 (L) 2024    HCT 26.9 (L) 2024    MCV 67 (L) 2024     (H) 2024     No results for input(s): "TSH", "FREET4" in the last 168 hours.  Lab Results   Component Value Date    HGBA1C 10.3 (H) 2024       Nutritional status:   Body mass index is 20.47 kg/m².  Lab Results   Component Value Date    ALBUMIN 2.6 (L) 2024    ALBUMIN 2.5 (L) 2024    ALBUMIN 2.4 (L) 2024     Lab Results   Component Value Date    PREALBUMIN 10 (L) 2024    PREALBUMIN 9 (L) 2024    PREALBUMIN 8 (L) 2024       Estimated Creatinine Clearance: 86.9 mL/min (based on SCr of 1.2 mg/dL).    Accu-Checks  Recent Labs     24  0529 24  0550 24  0610 24  1043 24  1140 24  1610 24  2024 24  2327 24  0627 24  1124   POCTGLUCOSE 59* 63* 94 294* 282* 247* 123* 109 185* 156*       Current Medications and/or Treatments Impacting Glycemic Control  Immunotherapy:    Immunosuppressants       None          Steroids:   Hormones (From admission, onward)      None          Pressors:    Autonomic Drugs (From admission, onward)      None          Hyperglycemia/Diabetes Medications: "   Antihyperglycemics (From admission, onward)      Start     Stop Route Frequency Ordered    09/02/24 1130  insulin aspart U-100 pen 10 Units         -- SubQ 3 times daily with meals 09/02/24 0841    09/02/24 0900  insulin glargine U-100 (Lantus) pen 14 Units         -- SubQ 2 times daily 09/02/24 0838    08/25/24 1506  insulin aspart U-100 pen 0-10 Units         -- SubQ Before meals & nightly PRN 08/25/24 1407

## 2024-09-03 NOTE — ASSESSMENT & PLAN NOTE
-HeartMate 3 Implanted  6/29/2022  as DT  -HTS Primary  -cont coumadin, Goal INR 2.0-3.0. Supratherapeutic this AM  Lab Results   Component Value Date    INR 3.3 (H) 09/03/2024    INR 2.8 (H) 09/02/2024    INR 2.4 (H) 09/01/2024       -Antiplatelets ASA 81 mg  -LDH is stable overall today. Will continue to monitor daily.  -Speed set at 5100, LSL 4700 rpm  -Interrogation notable for  power cable disconnected, low voltage advisory, PI events  -Not listed for OHTx- advised pt he would not be candidate for OHTx (noncompliance/poor f/up)   -UDS negative  -attempted to call partner Robyn, unfortunately goes to        Procedure: Device Interrogation Including analysis of device parameters  Current Settings: Ventricular Assist Device  Review of device function is stable/unstable stable        9/3/2024     8:00 AM 9/3/2024     4:23 AM 9/2/2024    11:28 PM 9/2/2024     9:10 PM 9/2/2024     4:00 PM 9/2/2024    12:00 PM 9/2/2024     8:00 AM   TXP LVAD INTERROGATIONS   Type HeartMate3 HeartMate3  HeartMate3 HeartMate3 HeartMate3 HeartMate3   Flow 3.5 4 3.8 3.9 4 3.8 4.1   Speed 5100 5100 5100 5100 5100 5100 5100   PI 7.3 5.9 6.4 6.3 6.5 6.5 5.9   Power (Serna) 3.6 3.5 3.7 3.5 3.7 3.6 3.6   LSL 4700 4700 4700 4700 4700 4700 4700   Pulsatility Intermittent pulse No Pulse Pulse  Pulse Pulse Pulse

## 2024-09-03 NOTE — ASSESSMENT & PLAN NOTE
MSSA DLESI  Bacteremia  Pyelnephritis/renal abscess  Fungemia    38 yo male with LVAD HM3 as DT, prior MSSA DLESI/bacteremia c/b empyema (on suppressive cefadroxil) and eroded ICD/pocket infection s/p removal, seizure admitted for back pain. Hospital course notable for blcx with micrococcus (suspect contaminant), MSSA bacteremia (suspect due to DLES), and c parapsilosis fungemia (suspect 2/2 to prior picc vs pyelo). DLES also with MSSA.  CT with contrast now with irregular nonenhancing renal lesion c/f pyelonephritis or potential abscess, R chest lesion c/f gynecomastia. Old PICC pulled 8/29. TTE without IE. S/p ophthalmology evaluation, no evidence of endophthalmitis. CT spine without evidence of OM/discitis.     BCx 9/1 still growing yeast. Concern persistent fungemia is due to antifungal resistance vs inadequate source control.    Recommendations:  -continue cefazolin 2g q8hr IV to target MSSA  -switch micafungin to fluconazole  -monitor for QTc prolongation  -GUILLERMO to eval for endocarditis given persistent fungemia  -f/u ID of yeast in blood cx 9/1  -repeat BCx with AM labs to assess for clearance

## 2024-09-03 NOTE — PROGRESS NOTES
[facility] - [facility]iology Stepdown  Heart Transplant  Progress Note    Patient Name: Kevan Queen  MRN: [id number]  Admission Date: 8/25/2024  Hospital Length of Stay: 9 days  Attending Physician: Natalya Villatoro MD  Primary Care Provider: Rosio Armendariz FNP  Principal Problem:LVAD (left ventricular assist device) present    Subjective:     Interval History:   Pt doing well overnight.     Only symptomatic compmlaint continues to be back pain. Will see how he does today on scheduled flexiril and oxy. Most recent blood cultures (drawn 9/1) continuing to show fungemia. Is also being treated for MSSA bacteremia- on cefazolin. PICC removed on 8/29. Has been on micafungin since 8/30.  Still Responding well to 2 mg of Bumex for diuresis.  Kidney function at baseline and euvolemic/ no JVD. No abdominal distension, abdomen soft. Net negative 1100 mL over last 24 hours.     Continuous Infusions: none       Scheduled Meds:   [START ON 9/9/2024] amiodarone  200 mg Oral Daily    amiodarone  400 mg Oral BID    amLODIPine  5 mg Oral Daily    aspirin  81 mg Oral Daily    atorvastatin  40 mg Oral Daily    bumetanide  2 mg Oral BID loop    ceFAZolin (Ancef) IV (PEDS and ADULTS)  2 g Intravenous Q8H    cyclobenzaprine  5 mg Oral Nightly    DULoxetine  30 mg Oral Daily    eplerenone  25 mg Oral Daily    gabapentin  100 mg Oral BID    gabapentin  400 mg Oral QHS    insulin aspart U-100  10 Units Subcutaneous TIDWM    insulin glargine U-100  14 Units Subcutaneous BID    levETIRAcetam  1,500 mg Oral BID    LIDOcaine  2 patch Transdermal Q24H    magnesium oxide  400 mg Oral Daily    micafungin  100 mg Intravenous Q24H    nicotine  1 patch Transdermal Daily    pantoprazole  40 mg Oral Daily    polyethylene glycol  17 g Oral Daily    warfarin  1 mg Oral Every Mon, Wed, Fri    warfarin  2 mg Oral Every Tues, Thurs, Sat, Sun     PRN Meds:  Current Facility-Administered Medications:     acetaminophen, 650 mg, Oral, Q6H PRN    dextrose  10%, 12.5 g, Intravenous, PRN    dextrose 10%, 25 g, Intravenous, PRN    glucagon (human recombinant), 1 mg, Intramuscular, PRN    glucose, 16 g, Oral, PRN    glucose, 24 g, Oral, PRN    insulin aspart U-100, 0-10 Units, Subcutaneous, QID (AC + HS) PRN    ondansetron, 4 mg, Oral, Q8H PRN    oxyCODONE, 10 mg, Oral, Daily PRN    Review of patient's allergies indicates:   Allergen Reactions    Torsemide Hives     Objective:     Vital Signs (Most Recent):  Temp: 97.5 °F (36.4 °C) (09/03/24 0750)  Pulse: 80 (09/03/24 1056)  Resp: 18 (09/03/24 0958)  BP: 107/76 (09/03/24 0958)  SpO2: 100 % (09/03/24 0958) Vital Signs (24h Range):  Temp:  [97.4 °F (36.3 °C)-98.2 °F (36.8 °C)] 97.5 °F (36.4 °C)  Pulse:  [73-90] 80  Resp:  [18] 18  SpO2:  [94 %-100 %] 100 %  BP: ()/(0-84) 107/76     Patient Vitals for the past 72 hrs (Last 3 readings):   Weight   09/03/24 0515 74.3 kg (163 lb 12.8 oz)   09/02/24 1145 73.1 kg (161 lb 2.5 oz)       Body mass index is 20.47 kg/m².      Intake/Output Summary (Last 24 hours) at 9/3/2024 1128  Last data filed at 9/3/2024 0842  Gross per 24 hour   Intake 1392 ml   Output 2750 ml   Net -1358 ml       Hemodynamic Parameters:       EKG- without ischemic changes this AM        Physical Exam  Constitutional:       General: He is not in acute distress.     Appearance: He is normal weight.   HENT:      Head: Normocephalic and atraumatic.      Right Ear: External ear normal.      Left Ear: External ear normal.      Nose: Nose normal.      Mouth/Throat:      Pharynx: Oropharynx is clear.   Cardiovascular:      Comments: Normal VAD hum, JVD present but improving   Pulmonary:      Effort: Pulmonary effort is normal.   Abdominal:      General: Abdomen is flat. Bowel sounds are normal. There is no distension (Distended but soft.).      Palpations: Abdomen is soft. There is no mass.      Tenderness: There is no abdominal tenderness. There is right CVA tenderness.   Musculoskeletal:      Cervical back:  Normal range of motion.      Right lower leg: No edema.      Left lower leg: No edema.      Comments: Cont b/l back pain- upper spine and b/l paraspinal/upper back muscular pain.    Skin:     General: Skin is warm.      Comments: Chest wall on the right side with gynecomastia, no overlying skin changes, no tenderness at the site however palpable mass versus nodule present.   Neurological:      Mental Status: He is alert. Mental status is at baseline.            Significant Labs:  CBC:  Recent Labs   Lab 09/02/24  0205 09/02/24  0601 09/03/24  0334   WBC 12.46 12.97* 9.79   RBC 3.47* 4.03* 4.04*   HGB 6.6* 7.8* 7.8*   HCT 23.4* 26.9* 26.9*   * 537* 602*   MCV 67* 67* 67*   MCH 19.0* 19.4* 19.3*   MCHC 28.2* 29.0* 29.0*     BNP:  Recent Labs   Lab 08/28/24  0345 08/30/24  0308 09/02/24  0205   * 462* 1,053*     CMP:  Recent Labs   Lab 08/28/24  0345 08/29/24  0346 08/30/24  0308 08/31/24  0244 09/01/24  0234 09/02/24  0205 09/03/24  0334   *   < > 287*   < > 199* 41* 143*   CALCIUM 9.0   < > 9.1   < > 9.3 9.1 9.9   ALBUMIN 2.4*  --  2.5*  --   --  2.6*  --    PROT 7.6  --  8.5*  --   --  8.5*  --    *   < > 130*   < > 128* 130* 128*   K 3.6   < > 4.1   < > 4.0 3.4* 4.9   CO2 26   < > 25   < > 24 26 27   CL 92*   < > 95   < > 93* 93* 91*   BUN 19   < > 21*   < > 21* 20 22*   CREATININE 1.1   < > 1.3   < > 1.3 1.2 1.2   ALKPHOS 215*  --  221*  --   --  230*  --    ALT 13  --  11  --   --  8*  --    AST 24  --  31  --   --  31  --    BILITOT 2.4*  --  2.0*  --   --  1.9*  --     < > = values in this interval not displayed.      Coagulation:   Recent Labs   Lab 09/01/24 0234 09/02/24 0205 09/03/24 0334   INR 2.4* 2.8* 3.3*   APTT 38.8* 40.9* 41.5*     LDH:  Recent Labs   Lab 09/01/24 0234 09/02/24 0205 09/03/24 0334   * 323* 300*     Microbiology:  Microbiology Results (last 7 days)       Procedure Component Value Units Date/Time    Blood culture [[id number]]  (Abnormal) Collected:  08/28/24 2226    Order Status: Completed Specimen: Blood Updated: 09/03/24 1014     Blood Culture, Routine Gram stain aer bottle: yeast      Gram stain dudley bottle: yeast      Positive results previously called 08/30/2024      YEAST   Identification pending  For susceptibility see order #[id number]  ID consult required at [facility].      Narrative:      Code 74554  Draw sample # 2 from separate site    Blood culture [[id number]]  (Abnormal) Collected: 08/28/24 2226    Order Status: Completed Specimen: Blood Updated: 09/03/24 1013     Blood Culture, Routine Gram stain aer bottle: yeast      Results called to and read back by:Lorri Riggs RN 08/30/2024  17:10      YEAST   Identification pending  For susceptibility see order #[id number]  ID consult required at [facility].      Narrative:      Code 31906  Draw sample # 2 from separate site    Blood culture [[id number]]  (Abnormal) Collected: 08/27/24 1751    Order Status: Completed Specimen: Blood Updated: 09/03/24 1012     Blood Culture, Routine Gram stain dudley bottle: Gram positive cocci in clusters resembling Staph      Results called to and read back by: Lorri Riggs RN  08/29/2024   13:50      Gram stain aer bottle: yeast      Results called to and read back by:Lorri Riggs RN 08/30/2024      STAPHYLOCOCCUS AUREUS  ID consult required at [facility].  For susceptibility see order #[id number]        YEAST   Identification pending  For susceptibility see order #[id number]  ID consult required at [facility].      Narrative:      Blood culture # 2 different location.    Blood culture [[id number]]  (Abnormal) Collected: 08/30/24 1152    Order Status: Completed Specimen: Blood Updated: 09/03/24 0856     Blood Culture, Routine Gram stain dudley bottle: yeast      Results called to and read back by: Rufino 09/01/2024  02:03      CANDIDA PARAPSILOSIS  ID  consult required at [facility].  Susceptibility pending      Narrative:      Lft AC    Blood culture [[id number]] Collected: 09/01/24 0939    Order Status: Completed Specimen: Blood Updated: 09/03/24 0617     Blood Culture, Routine Gram stain dudley bottle: yeast      Results called to and read back by: Rufino 09/03/2024  06:15    Blood culture [[id number]] Collected: 09/01/24 0939    Order Status: Completed Specimen: Blood Updated: 09/03/24 0147     Blood Culture, Routine Gram stain aer bottle: yeast      Gram stain dudley bottle: yeast      Results called to and read back by: Rufino 09/03/2024  01:45    Blood culture [[id number]] Collected: 08/30/24 1152    Order Status: Completed Specimen: Blood Updated: 09/02/24 1412     Blood Culture, Routine No Growth to date      No Growth to date      No Growth to date      No Growth to date    Narrative:      Rt AC    Culture, Anaerobe [[id number]] Collected: 08/27/24 1446    Order Status: Completed Specimen: Skin from Abdomen Updated: 09/02/24 0936     Anaerobic Culture No anaerobes isolated    Narrative:      Driveline site    Blood culture [[id number]]  (Abnormal)  (Susceptibility) Collected: 08/27/24 1802    Order Status: Completed Specimen: Blood Updated: 08/31/24 1049     Blood Culture, Routine Gram stain aer bottle: Gram positive cocci in clusters resembling Staph      Results called to and read back by:Miguelina [facility] RN 08/28/2024  22:59      MICROCOCCUS SPECIES  Organism is a probable contaminant        STAPHYLOCOCCUS AUREUS  [facility]      Rapid Organism ID by PCR (from Blood culture) [[id number]] Collected: 08/28/24 2226    Order Status: Completed Updated: 08/30/24 1823     Enterococcus faecalis Not Detected     Enterococcus faecium Not Detected     Listeria monocytogenes Not Detected     Staphylococcus spp. Not Detected     Staphylococcus aureus Not Detected     Staphylococcus  epidermidis Not Detected     Staphylococcus lugdunensis Not Detected     Streptococcus species Not Detected     Streptococcus agalactiae Not Detected     Streptococcus pneumoniae Not Detected     Streptococcus pyogenes Not Detected     Acinetobacter calcoaceticus/baumannii complex Not Detected     Bacteroides fragilis Not Detected     Enterobacterales Not Detected     Enterobacter cloacae complex Not Detected     Escherichia coli Not Detected     Klebsiella aerogenes Not Detected     Klebsiella oxytoca Not Detected     Klebsiella pneumoniae group Not Detected     Proteus Not Detected     Salmonella sp Not Detected     Serratia marcescens Not Detected     Haemophilus influenzae Not Detected     Neisseria meningtidis Not Detected     Pseudomonas aeruginosa Not Detected     Stenotrophomonas maltophilia Not Detected     Candida albicans Not Detected     Candida auris Not Detected     Candida glabrata Not Detected     Candida krusei Not Detected     Candida parapsilosis Not Detected     Candida tropicalis Not Detected     Cryptococcus neoformans/gattii Not Detected     CTX-M (ESBL ) Test Not Applicable     IMP (Carbapenem resistant) Test Not Applicable     KPC resistance gene (Carbapenem resistant) Test Not Applicable     mcr-1  Test Not Applicable     mec A/C  Test Not Applicable     mec A/C and MREJ (MRSA) gene Test Not Applicable     NDM (Carbapenem resistant) Test Not Applicable     OXA-48-like (Carbapenem resistant) Test Not Applicable     van A/B (VRE gene) Test Not Applicable     VIM (Carbapenem resistant) Test Not Applicable    Narrative:      Code 14560  Draw sample # 2 from separate site    Culture, Anaerobe [[id number]] Collected: 08/25/24 1733    Order Status: Completed Specimen: Wound from Abdomen Updated: 08/30/24 1405     Anaerobic Culture No anaerobes isolated    Narrative:      Drive line exit site    Aerobic culture [[id number]]  (Abnormal)  (Susceptibility) Collected: 08/27/24 1446    Order  Status: Completed Specimen: Skin from Abdomen Updated: 08/29/24 1424     Aerobic Bacterial Culture STAPHYLOCOCCUS AUREUS  Rare      Narrative:      Driveline site    MRSA/SA Rapid ID by PCR from Blood culture [[id number]]  (Abnormal) Collected: 08/27/24 1802    Order Status: Completed Updated: 08/29/24 0027     Staph aureus ID by PCR Positive     Methicillin Resistant ID by PCR Negative            I have reviewed all pertinent labs within the past 24 hours.    Estimated Creatinine Clearance: 86.9 mL/min (based on SCr of 1.2 mg/dL).    Diagnostic Results:  I have reviewed and interpreted all pertinent imaging results/findings within the past 24 hours.  Assessment and Plan:     Mr. Kevan Thacker is a 39 year old male with stage D 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 He underwent RVAD removal and chest closure 6/30/2022.  He was weaned off  but he had to restarted due to RVF. He was eventually transitioned to milrinone (secondary to  shortage) now supposed to be on 0.25 mcg/kg/min. He has a history of unclear syncope vs seizures and is followed by neuro for this and is on Keppra.  He is being admitted after being out of primacor for 1 week with ongoing back pain.      On 11/15/23 underwent removal of eroded [employer] scICD--no Lifevest (due to LVAD) and no plan to replace ICD as not requiring therapy post LVAD with concern for reinfection.  He has not had neurology f/u with seizure hx on keppra so coordinator to assist him with obtaining appt.      Reports back pain (primarily upper back pain) for the past 3-4 days. He reports that it started 2 days after stopping pirmacor and he feels it may be related. He has also been sleeping in the couch and identifies that it may  also be related to the back pain. He denies lifting anything heavy or any falls /trauma. No red flags including incontinence of stool or urine. No numbness in the groin area or weakness in the legs or upper extremity reported. He reports he was not able to go for appointment because of transport issue that has now been resolved. He denies orthopnea, PND, weight gain, leg swelling. He says that he does have some shortness of breath after walking long distances which has been ongoing for months now and has not changed lately. He has lost some weight over last few months since his but weight has been stable over the last month. He denied headache, constipation, diarrhea, SOB, cough, palpitations or any additional symptoms.     * LVAD (left ventricular assist device) present  -HeartMate 3 Implanted  6/29/2022  as DT  -HTS Primary  -cont coumadin, Goal INR 2.0-3.0. Supratherapeutic this AM  Lab Results   Component Value Date    INR 3.3 (H) 09/03/2024    INR 2.8 (H) 09/02/2024    INR 2.4 (H) 09/01/2024       -Antiplatelets ASA 81 mg  -LDH is stable overall today. Will continue to monitor daily.  -Speed set at 5100, LSL 4700 rpm  -Interrogation notable for  power cable disconnected, low voltage advisory, PI events  -Not listed for OHTx- advised pt he would not be candidate for OHTx (noncompliance/poor f/up)   -UDS negative  -attempted to call partner Robyn, unfortunately goes to        Procedure: Device Interrogation Including analysis of device parameters  Current Settings: Ventricular Assist Device  Review of device function is stable/unstable stable        9/3/2024     8:00 AM 9/3/2024     4:23 AM 9/2/2024    11:28 PM 9/2/2024     9:10 PM 9/2/2024     4:00 PM 9/2/2024    12:00 PM 9/2/2024     8:00 AM   TXP LVAD INTERROGATIONS   Type HeartMate3 HeartMate3  HeartMate3 HeartMate3 HeartMate3 HeartMate3   Flow 3.5 4 3.8 3.9 4 3.8 4.1   Speed 5100 5100 5100 5100 5100 5100 5100   PI 7.3 5.9 6.4 6.3 6.5 6.5 5.9   Power  (Serna) 3.6 3.5 3.7 3.5 3.7 3.6 3.6   LSL 4700 4700 4700 4700 4700 4700 4700   Pulsatility Intermittent pulse No Pulse Pulse  Pulse Pulse Pulse         Pulmonary nodules  Multiple pulmonary nodules seen on CT scan with contrast. Pt does have hx tobacco use. Lymph node enlargement noted as well. Pt has weight loss but not drastic  -need f/up scan in 3-6 months to assess stability of nodules     Pyelonephritis  Seen on ct with contrast. ID following.   -cont abx per ID   -no need for percutaneous drain per IR at this time unless pt decompensates  -current bacteremia/yeast not from renal source     Severe sepsis  -CT chest/abdomen and pelvis w/ contrast w/ pyelonephritis and renal abscess   -repeat CT spine with contrast +visualization of kidney read is stable, without worsening pyelo or abscess, no evidence of discitis    Plan:  -F/up cultures- +ve for staph, neg MRSA pcr, yeast now growing in blood as well  -Follow cultires from 9/1 - NGTD  -ID following-c/w cefazolin, micafungin  -C/s to Optho for eval of endopthalmitis in setting of yeast in blood   -Removed PICC on 8/28  -Driveline infection vs pyelo vs PICC related  - TTE negative for endocarditis      Atrial flutter  pt previously went into atach vs aflutter. Pt was started on amiodarone, with rhythm aborted to NSR following bolus. BP remained stable.   -BMP, replace lytes as needed   -cont amiodarone- will complete load and keep on maintenance dosing thereafter  -pt does report intermittently at home having episodes of palpitations that may also represent paroxysmal afib vs flutter at home. Already on a/c. Chadvasc of 5          Polysubstance abuse  Pt has history of polysubstance use.   -UDS negative  -prn oxycodone for pyelonephritis pain x1 per day max given hx substance use       Subcutaneous nodule of breast  Pt with unilateral R sided gynecomastia with hard nodular feeling beneath soft subcutaneous tissue of breast. No notable skin dimpling or rash.   CT  with contrast reporting gyneocomastia without fluid collection/abscess. Pulmonary nodules seen on exam (needs f/up scan in 3-6 mos), enlarged lymph nodes noted on scan as well. D/w pt need for continued f/up outpatient for cancer screening. Appears to be more reactive than malignant on most recent scan.  -transition from spironolactone to epleronone     Supratherapeutic INR  -Goal INR 2-3 , INR 4.7 on admission - improved  - pharmacy  to manage coumadin dosing, cont warfarin   Check INR daily     Lab Results   Component Value Date    INR 3.3 (H) 09/03/2024    INR 2.8 (H) 09/02/2024    INR 2.4 (H) 09/01/2024         Bilateral back pain  Pt reports initial pain following running out of primacor. S/p spinal Xrays without significant abnormality besides degenerative disc disease.  NO red flag symptoms on admission  CT spine cervical, thoracic and lumbar negative for discitis/infectious concern and renal collection/pyelo appears to be unchanged.     Plan:  - Multimodal pain control  - titrate to patient's response  - C/w home gabapentin, tylenol prn and lidocaine patches.   - C/w Prn oxycodone added  - has not been using   - Schedule muscle relaxant overnight and assess for change in symptoms.   - Back pain more pronounced on R side, most likely cause is his pyelonephritis  -c/w abx for pyelonephritis        Hypokalemia  Replace as needed and monitor      Type 2 diabetes mellitus with hyperglycemia, with long-term current use of insulin  A1C 10.3  Endocrinology consulted for glucose management, largely uncontrolled       Lab Results   Component Value Date    HGBA1C 10.3 (H) 08/25/2024    HGBA1C >14.0 (H) 04/26/2024    HGBA1C >14.0 (H) 02/17/2024    OSJVSDO1V 7.5 06/12/2023    KNHDMBA5S 8.5 02/07/2023           Moderate malnutrition  -pt with aggressive care goals  -c/s nutrition, appreciate assistance     Seizure-like activity  History   C/w home keppra 1.5 gm BID    CHF (NYHA class IV, ACC/AHA stage D)    Updated  8/26/24:  EF 5-10% global hypokinesis, RV enlargement and global hypokinesis, biatrial enlargement, mild-to-moderate MR, severe TR, no van HeartMate 3 in place, aortic valve does not open, LVEDd 7.2  Previous TTE 9/5/23 with EF 10-15%, mod to sever TR, LVEDd 7.11, LVAD - HM3 in place    -continue on bumex 2mg BID, cont inpatient treatment for bacteremia and fungemia   -GDMT: d/c spironolactone given possible gynecomastia, transition to epleronone   -Previously on home Primacor 0.25 at home but ran out approx 5 days prior to admission and also reports he occasionally disconnects himself from his pump to perform certain activities. Will plan to d/c off primacor   - TTE With LVEF 5-10%, poor RV function, dilation, no signs of endocarditis on TTE   -palliative care c/s and pt discussed with service, appreciate f/up- plan for continued follow up with palliative outpatient as well with Dr. Carr   -repeated [facility] discussion on 8/31- full code, aggressive care, pt educated about compliance going forward - re-iterated. Pt understanding, apologetic for previous noncompliance. Pt understands he is not a candidate for heart transplant and will not be evaluated due to his noncompliance.           Jeff Spencer PA-C  Heart Transplant  [facility] - Cardiology Stepdown

## 2024-09-03 NOTE — NURSING
Pt's VAD dressing changed per protocol using kit and sterile technique. Pt's drive line site is moist dry, intact with small purulent yellow  drainage at site and old dressing; DLES is + (2). No kinks or frays on drive line, secured with tape per pt's request. Pt tolerated dressing change well. Next dressing change due 09/04/2024.

## 2024-09-03 NOTE — NURSING
Notified King DEYA MAP 96, doppler 88, pt c/o 10/10 back pain, oxy given. Will check BP later. 0958: MAP 86, doppler 86, pt said pain went away, WCTM.

## 2024-09-03 NOTE — PROGRESS NOTES
[facility] - Cardiology Stepdown  Endocrinology  Progress Note    Admit Date: 8/25/2024     Reason for Consult: Management of T2DM, Hyperglycemia     Surgical Procedure and Date: LVAD 06/29/2022     Diabetes diagnosis year: 2022    Home Diabetes Medications:  Levemir 20 units twice daily and Novolog SSI (150/25) per patient    How often checking glucose at home?  Has not checked in a few weeks due to running out of strips    BG readings on regimen: COLLIN  Hypoglycemia on the regimen?  Yes; rarely experiences hypoglycemic symptoms such as blurry vision and vomiting. However, does not know his blood sugar level due to running out of supplies. He will self-correct with a snack.  Missed doses on regimen?  Yes, has not had insulin in a few weeks due to running out of glucometer supplies.    Diabetes Complications include:   Hyperglycemia    Complicating diabetes co morbidities:   History of MI, CHF, and CKD      HPI:   Patient is a 39 y.o. 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 He underwent RVAD removal and chest closure 6/30/2022.  He was weaned off  but restarted due to RVF. He was transitioned to milrinone (secondary to  shortage). History of unclear syncope vs seizures and followed by neuro and is on Keppra. On 11/15/23 underwent removal of eroded [employer] scICD--no Lifevest (due to LVAD) and no plan to replace ICD as not requiring therapy post LVAD with concern for reinfection. Reported back pain (primarily upper back pain) for the past 3-4 days. He reported that it started 2 days after stopping pirmacor and he felt it may be related. Reported some shortness of breath after walking long distances which has been ongoing for months now and has not changed lately. He has lost some weight over last few months since his  "but weight has been stable over the last month. Patient admitted after being out of [facility] for 1 week with ongoing back pain. Patient stated he has not taken his insulin in a few weeks due to running out of glucometer testing supplies. He previously had a William, but is not wearing one. BG >700 at [facility]. Endo consulted for BG management.      Interval HPI:   Overnight events: Remains in [facility]. BG better controlled today on current SQ insulin regimen. No hypoglycemia noted. Diet diabetic Cardiac (Low Na/Chol); 2000 Calorie; Fluid - 1500mL; Standard Tray    Eatin%  Nausea: No  Hypoglycemia and intervention: No  Fever: No  TPN and/or TF: No  If yes, type of TF/TPN and rate: n/a    BP 95/70 (BP Location: Left arm, Patient Position: Lying)   Pulse 82   Temp 97.7 °F (36.5 °C) (Oral)   Resp 18   Ht 6' 3" (1.905 m)   Wt 74.3 kg (163 lb 12.8 oz)   SpO2 96%   BMI 20.47 kg/m²     Labs Reviewed and Include    Recent Labs   Lab 24  0334   *   CALCIUM 9.9   *   K 4.9   CO2 27   CL 91*   BUN 22*   CREATININE 1.2     Lab Results   Component Value Date    WBC 9.79 2024    HGB 7.8 (L) 2024    HCT 26.9 (L) 2024    MCV 67 (L) 2024     (H) 2024     No results for input(s): "TSH", "FREET4" in the last 168 hours.  Lab Results   Component Value Date    HGBA1C 10.3 (H) 2024       Nutritional status:   Body mass index is 20.47 kg/m².  Lab Results   Component Value Date    ALBUMIN 2.6 (L) 2024    ALBUMIN 2.5 (L) 2024    ALBUMIN 2.4 (L) 2024     Lab Results   Component Value Date    PREALBUMIN 10 (L) 2024    PREALBUMIN 9 (L) 2024    PREALBUMIN 8 (L) 2024       Estimated Creatinine Clearance: 86.9 mL/min (based on SCr of 1.2 mg/dL).    Accu-Checks  Recent Labs     24  0529 24  0550 24  0610 24  1043 24  1140 24  1610 24  2024 24  2327 24  0627 24  1124 "   POCTGLUCOSE 59* 63* 94 294* 282* 247* 123* 109 185* 156*       Current Medications and/or Treatments Impacting Glycemic Control  Immunotherapy:    Immunosuppressants       None          Steroids:   Hormones (From admission, onward)      None          Pressors:    Autonomic Drugs (From admission, onward)      None          Hyperglycemia/Diabetes Medications:   Antihyperglycemics (From admission, onward)      Start     Stop Route Frequency Ordered    09/02/24 1130  insulin aspart U-100 pen 10 Units         -- SubQ 3 times daily with meals 09/02/24 0841    09/02/24 0900  insulin glargine U-100 (Lantus) pen 14 Units         -- SubQ 2 times daily 09/02/24 0838    08/25/24 1506  insulin aspart U-100 pen 0-10 Units         -- SubQ Before meals & nightly PRN 08/25/24 1407            ASSESSMENT and PLAN    Cardiac/Vascular  * LVAD (left ventricular assist device) present  Managed by primary team  Optimize blood glucose control        CHF (NYHA class IV, ACC/AHA stage D)  Managed by primary team  Optimize blood glucose control      Endocrine  Type 2 diabetes mellitus with hyperglycemia, with long-term current use of insulin  BG goal 140-180  Noncompliant T2DM.       Plan:   - Lantus (Insulin Glargine) 14 units BID (0.8 u/kg dosing)   -Novolog (Insulin Aspart) 10 units TIDWM.HOLD if BG < 100  - Continue moderate dose correction (150/25)  - BG checks AC/HS  - Hypoglycemia protocol in place    ** Please notify Endocrine for any change and/or advance in diet**  ** Please call Endocrine for any BG related issues **    Discharge Planning:   TBD. Please notify endocrinology prior to discharge.  Patient requires glucometer and testing supplies.  Recommend he resumes his William for blood sugar monitoring.          Jody Starkey, NP  Endocrinology  [facility] - Cardiology Stepdown

## 2024-09-03 NOTE — ASSESSMENT & PLAN NOTE
-Goal INR 2-3 , INR 4.7 on admission - improved  - pharmacy  to manage coumadin dosing, cont warfarin   Check INR daily     Lab Results   Component Value Date    INR 3.3 (H) 09/03/2024    INR 2.8 (H) 09/02/2024    INR 2.4 (H) 09/01/2024

## 2024-09-03 NOTE — NURSING
Notified .PA INR 3.3, ans there is 2 mg warfarin scheduled, and King DEYA is ok to give warfarin.

## 2024-09-03 NOTE — PLAN OF CARE
Mag replaced. VSS. BG monitored. LVAD number and doppler WNL. LVAD dressing changed per protocol ( see note). Pt educated on fall risk and remained free from falls/trauma/injury. Denies chest pain, SOB, palpitations, dizziness, pain, or discomfort. Plan of care reviewed with pt, all questions answered. Bed locked in lowest position, call bell within reach, no acute distress noted, will  continue to monitor.

## 2024-09-03 NOTE — PATIENT CARE CONFERENCE
Visited with  Bernice today about his next Clinic appointment. Patient verbalized he will be at his next appointment with VAD so that his EBB in his primary controller can be changed out. Patient had no further needs or questions at this time.

## 2024-09-03 NOTE — PLAN OF CARE
Pt free of falls and injury. Pt AAOx4. Fall precautions remain in place. Pt remains on room air. Sats %. NSR on telemetry with LVAD artifact. LVAD hum present and smooth. VAD numbers and dopplers WDL. DLES dressing CDI. Plan of care reviewed with pt.  Intake and output monitored. Glucose monitored. Pt resting comfortably with no complaints of pain. Pt denies chest pain, headache, and SOB. No acute distress noted,  plan of care continues.    Problem: Adult Inpatient Plan of Care  Goal: Plan of Care Review  Outcome: Progressing  Goal: Patient-Specific Goal (Individualized)  Outcome: Progressing  Goal: Absence of Hospital-Acquired Illness or Injury  Outcome: Progressing  Goal: Optimal Comfort and Wellbeing  Outcome: Progressing  Goal: Readiness for Transition of Care  Outcome: Progressing     Problem: Diabetes Comorbidity  Goal: Blood Glucose Level Within Targeted Range  Outcome: Progressing     Problem: Infection  Goal: Absence of Infection Signs and Symptoms  Outcome: Progressing     Problem: Ventricular Assist Device  Goal: Optimal Adjustment to Device  Outcome: Progressing  Goal: Absence of Bleeding  Outcome: Progressing  Goal: Absence of Embolism Signs and Symptoms  Outcome: Progressing  Goal: Optimal Blood Flow  Outcome: Progressing  Goal: Absence of Infection Signs and Symptoms  Outcome: Progressing  Goal: Effective Right-Sided Heart Function  Outcome: Progressing     Problem: Ventricular Assist Device  Goal: Optimal Adjustment to Device  Outcome: Progressing  Goal: Absence of Bleeding  Outcome: Progressing  Goal: Absence of Embolism Signs and Symptoms  Outcome: Progressing  Goal: Optimal Blood Flow  Outcome: Progressing  Goal: Absence of Infection Signs and Symptoms  Outcome: Progressing  Goal: Effective Right-Sided Heart Function  Outcome: Progressing     Problem: Coping Ineffective  Goal: Effective Coping  Outcome: Progressing     Problem: Fall Injury Risk  Goal: Absence of Fall and Fall-Related  Injury  Outcome: Progressing     Problem: Sepsis/Septic Shock  Goal: Optimal Coping  Outcome: Progressing  Goal: Absence of Bleeding  Outcome: Progressing  Goal: Blood Glucose Level Within Targeted Range  Outcome: Progressing  Goal: Absence of Infection Signs and Symptoms  Outcome: Progressing  Goal: Optimal Nutrition Intake  Outcome: Progressing

## 2024-09-03 NOTE — NURSING
Notified .PA pt c/o back pain 10/10 now, Oxycodone given around 0840, cyclobenzaprine ordered and given. 1740: pt denies any pain right now after med given.

## 2024-09-03 NOTE — ASSESSMENT & PLAN NOTE
BG goal 140-180  Noncompliant T2DM.       Plan:   - Lantus (Insulin Glargine) 14 units BID (0.8 u/kg dosing)   -Novolog (Insulin Aspart) 10 units TIDWM.HOLD if BG < 100  - Continue moderate dose correction (150/25)  - BG checks AC/HS  - Hypoglycemia protocol in place    ** Please notify Endocrine for any change and/or advance in diet**  ** Please call Endocrine for any BG related issues **    Discharge Planning:   TBD. Please notify endocrinology prior to discharge.  Patient requires glucometer and testing supplies.  Recommend he resumes his William for blood sugar monitoring.

## 2024-09-03 NOTE — SUBJECTIVE & OBJECTIVE
Interval History:   Pt doing well overnight.     Only symptomatic compmlaint continues to be back pain. Will see how he does today on scheduled flexiril and oxy. Most recent blood cultures (drawn 9/1) continuing to show fungemia. Is also being treated for MSSA bacteremia- on cefazolin. PICC removed on 8/29. Has been on micafungin since 8/30.  Still Responding well to 2 mg of Bumex for diuresis.  Kidney function at baseline and euvolemic/ no JVD. No abdominal distension, abdomen soft. Net negative 1100 mL over last 24 hours.     Continuous Infusions: none       Scheduled Meds:   [START ON 9/9/2024] amiodarone  200 mg Oral Daily    amiodarone  400 mg Oral BID    amLODIPine  5 mg Oral Daily    aspirin  81 mg Oral Daily    atorvastatin  40 mg Oral Daily    bumetanide  2 mg Oral BID loop    ceFAZolin (Ancef) IV (PEDS and ADULTS)  2 g Intravenous Q8H    cyclobenzaprine  5 mg Oral Nightly    DULoxetine  30 mg Oral Daily    eplerenone  25 mg Oral Daily    gabapentin  100 mg Oral BID    gabapentin  400 mg Oral QHS    insulin aspart U-100  10 Units Subcutaneous TIDWM    insulin glargine U-100  14 Units Subcutaneous BID    levETIRAcetam  1,500 mg Oral BID    LIDOcaine  2 patch Transdermal Q24H    magnesium oxide  400 mg Oral Daily    micafungin  100 mg Intravenous Q24H    nicotine  1 patch Transdermal Daily    pantoprazole  40 mg Oral Daily    polyethylene glycol  17 g Oral Daily    warfarin  1 mg Oral Every Mon, Wed, Fri    warfarin  2 mg Oral Every Tues, Thurs, Sat, Sun     PRN Meds:  Current Facility-Administered Medications:     acetaminophen, 650 mg, Oral, Q6H PRN    dextrose 10%, 12.5 g, Intravenous, PRN    dextrose 10%, 25 g, Intravenous, PRN    glucagon (human recombinant), 1 mg, Intramuscular, PRN    glucose, 16 g, Oral, PRN    glucose, 24 g, Oral, PRN    insulin aspart U-100, 0-10 Units, Subcutaneous, QID (AC + HS) PRN    ondansetron, 4 mg, Oral, Q8H PRN    oxyCODONE, 10 mg, Oral, Daily PRN    Review of patient's  allergies indicates:   Allergen Reactions    Torsemide Hives     Objective:     Vital Signs (Most Recent):  Temp: 97.5 °F (36.4 °C) (09/03/24 0750)  Pulse: 80 (09/03/24 1056)  Resp: 18 (09/03/24 0958)  BP: 107/76 (09/03/24 0958)  SpO2: 100 % (09/03/24 0958) Vital Signs (24h Range):  Temp:  [97.4 °F (36.3 °C)-98.2 °F (36.8 °C)] 97.5 °F (36.4 °C)  Pulse:  [73-90] 80  Resp:  [18] 18  SpO2:  [94 %-100 %] 100 %  BP: ()/(0-84) 107/76     Patient Vitals for the past 72 hrs (Last 3 readings):   Weight   09/03/24 0515 74.3 kg (163 lb 12.8 oz)   09/02/24 1145 73.1 kg (161 lb 2.5 oz)       Body mass index is 20.47 kg/m².      Intake/Output Summary (Last 24 hours) at 9/3/2024 1128  Last data filed at 9/3/2024 0842  Gross per 24 hour   Intake 1392 ml   Output 2750 ml   Net -1358 ml       Hemodynamic Parameters:       EKG- without ischemic changes this AM        Physical Exam  Constitutional:       General: He is not in acute distress.     Appearance: He is normal weight.   HENT:      Head: Normocephalic and atraumatic.      Right Ear: External ear normal.      Left Ear: External ear normal.      Nose: Nose normal.      Mouth/Throat:      Pharynx: Oropharynx is clear.   Cardiovascular:      Comments: Normal VAD hum, JVD present but improving   Pulmonary:      Effort: Pulmonary effort is normal.   Abdominal:      General: Abdomen is flat. Bowel sounds are normal. There is no distension (Distended but soft.).      Palpations: Abdomen is soft. There is no mass.      Tenderness: There is no abdominal tenderness. There is right CVA tenderness.   Musculoskeletal:      Cervical back: Normal range of motion.      Right lower leg: No edema.      Left lower leg: No edema.      Comments: Cont b/l back pain- upper spine and b/l paraspinal/upper back muscular pain.    Skin:     General: Skin is warm.      Comments: Chest wall on the right side with gynecomastia, no overlying skin changes, no tenderness at the site however palpable mass  versus nodule present.   Neurological:      Mental Status: He is alert. Mental status is at baseline.            Significant Labs:  CBC:  Recent Labs   Lab 09/02/24 0205 09/02/24  0601 09/03/24  0334   WBC 12.46 12.97* 9.79   RBC 3.47* 4.03* 4.04*   HGB 6.6* 7.8* 7.8*   HCT 23.4* 26.9* 26.9*   * 537* 602*   MCV 67* 67* 67*   MCH 19.0* 19.4* 19.3*   MCHC 28.2* 29.0* 29.0*     BNP:  Recent Labs   Lab 08/28/24 0345 08/30/24  0308 09/02/24  0205   * 462* 1,053*     CMP:  Recent Labs   Lab 08/28/24 0345 08/29/24 0346 08/30/24 0308 08/31/24  0244 09/01/24 0234 09/02/24 0205 09/03/24 0334   *   < > 287*   < > 199* 41* 143*   CALCIUM 9.0   < > 9.1   < > 9.3 9.1 9.9   ALBUMIN 2.4*  --  2.5*  --   --  2.6*  --    PROT 7.6  --  8.5*  --   --  8.5*  --    *   < > 130*   < > 128* 130* 128*   K 3.6   < > 4.1   < > 4.0 3.4* 4.9   CO2 26   < > 25   < > 24 26 27   CL 92*   < > 95   < > 93* 93* 91*   BUN 19   < > 21*   < > 21* 20 22*   CREATININE 1.1   < > 1.3   < > 1.3 1.2 1.2   ALKPHOS 215*  --  221*  --   --  230*  --    ALT 13  --  11  --   --  8*  --    AST 24  --  31  --   --  31  --    BILITOT 2.4*  --  2.0*  --   --  1.9*  --     < > = values in this interval not displayed.      Coagulation:   Recent Labs   Lab 09/01/24  0234 09/02/24  0205 09/03/24  0334   INR 2.4* 2.8* 3.3*   APTT 38.8* 40.9* 41.5*     LDH:  Recent Labs   Lab 09/01/24  0234 09/02/24  0205 09/03/24  0334   * 323* 300*     Microbiology:  Microbiology Results (last 7 days)       Procedure Component Value Units Date/Time    Blood culture [[id number]]  (Abnormal) Collected: 08/28/24 2226    Order Status: Completed Specimen: Blood Updated: 09/03/24 1014     Blood Culture, Routine Gram stain aer bottle: yeast      Gram stain dudley bottle: yeast      Positive results previously called 08/30/2024      YEAST   Identification pending  For susceptibility see order #[id number]  ID consult required at [facility] and  [facility].      Narrative:      Code 14510  Draw sample # 2 from separate site    Blood culture [[id number]]  (Abnormal) Collected: 08/28/24 2226    Order Status: Completed Specimen: Blood Updated: 09/03/24 1013     Blood Culture, Routine Gram stain aer bottle: yeast      Results called to and read back by:Lorri Riggs RN 08/30/2024  17:10      YEAST   Identification pending  For susceptibility see order #[id number]  ID consult required at [facility].      Narrative:      Code 47801  Draw sample # 2 from separate site    Blood culture [[id number]]  (Abnormal) Collected: 08/27/24 1751    Order Status: Completed Specimen: Blood Updated: 09/03/24 1012     Blood Culture, Routine Gram stain dudley bottle: Gram positive cocci in clusters resembling Staph      Results called to and read back by: Lorri Riggs RN  08/29/2024   13:50      Gram stain aer bottle: yeast      Results called to and read back by:Lorri Riggs RN 08/30/2024      STAPHYLOCOCCUS AUREUS  ID consult required at [facility].  For susceptibility see order #[id number]        YEAST   Identification pending  For susceptibility see order #[id number]  ID consult required at [facility].      Narrative:      Blood culture # 2 different location.    Blood culture [[id number]]  (Abnormal) Collected: 08/30/24 1152    Order Status: Completed Specimen: Blood Updated: 09/03/24 0856     Blood Culture, Routine Gram stain dudley bottle: yeast      Results called to and read back by: Rufino 09/01/2024  02:03      CANDIDA PARAPSILOSIS  ID consult required at [facility].  Susceptibility pending      Narrative:      Lft AC    Blood culture [[id number]] Collected: 09/01/24 0939    Order Status: Completed Specimen: Blood Updated: 09/03/24 0617     Blood Culture, Routine Gram stain dudley bottle: yeast      Results called to and read back by: Rufino  09/03/2024  06:15    Blood culture [[id number]] Collected: 09/01/24 0939    Order Status: Completed Specimen: Blood Updated: 09/03/24 0147     Blood Culture, Routine Gram stain aer bottle: yeast      Gram stain dudley bottle: yeast      Results called to and read back by: Rufino 09/03/2024  01:45    Blood culture [[id number]] Collected: 08/30/24 1152    Order Status: Completed Specimen: Blood Updated: 09/02/24 1412     Blood Culture, Routine No Growth to date      No Growth to date      No Growth to date      No Growth to date    Narrative:      Rt AC    Culture, Anaerobe [[id number]] Collected: 08/27/24 1446    Order Status: Completed Specimen: Skin from Abdomen Updated: 09/02/24 0936     Anaerobic Culture No anaerobes isolated    Narrative:      Driveline site    Blood culture [[id number]]  (Abnormal)  (Susceptibility) Collected: 08/27/24 1802    Order Status: Completed Specimen: Blood Updated: 08/31/24 1049     Blood Culture, Routine Gram stain aer bottle: Gram positive cocci in clusters resembling Staph      Results called to and read back by:Miguelina [facility] RN 08/28/2024  22:59      MICROCOCCUS SPECIES  Organism is a probable contaminant        STAPHYLOCOCCUS AUREUS  ID consult required at [facility] and [facility].      Rapid Organism ID by PCR (from Blood culture) [[id number]] Collected: 08/28/24 2226    Order Status: Completed Updated: 08/30/24 1823     Enterococcus faecalis Not Detected     Enterococcus faecium Not Detected     Listeria monocytogenes Not Detected     Staphylococcus spp. Not Detected     Staphylococcus aureus Not Detected     Staphylococcus epidermidis Not Detected     Staphylococcus lugdunensis Not Detected     Streptococcus species Not Detected     Streptococcus agalactiae Not Detected     Streptococcus pneumoniae Not Detected     Streptococcus pyogenes Not Detected     Acinetobacter calcoaceticus/baumannii complex Not Detected     Bacteroides fragilis Not Detected      Enterobacterales Not Detected     Enterobacter cloacae complex Not Detected     Escherichia coli Not Detected     Klebsiella aerogenes Not Detected     Klebsiella oxytoca Not Detected     Klebsiella pneumoniae group Not Detected     Proteus Not Detected     Salmonella sp Not Detected     Serratia marcescens Not Detected     Haemophilus influenzae Not Detected     Neisseria meningtidis Not Detected     Pseudomonas aeruginosa Not Detected     Stenotrophomonas maltophilia Not Detected     Candida albicans Not Detected     Candida auris Not Detected     Candida glabrata Not Detected     Candida krusei Not Detected     Candida parapsilosis Not Detected     Candida tropicalis Not Detected     Cryptococcus neoformans/gattii Not Detected     CTX-M (ESBL ) Test Not Applicable     IMP (Carbapenem resistant) Test Not Applicable     KPC resistance gene (Carbapenem resistant) Test Not Applicable     mcr-1  Test Not Applicable     mec A/C  Test Not Applicable     mec A/C and MREJ (MRSA) gene Test Not Applicable     NDM (Carbapenem resistant) Test Not Applicable     OXA-48-like (Carbapenem resistant) Test Not Applicable     van A/B (VRE gene) Test Not Applicable     VIM (Carbapenem resistant) Test Not Applicable    Narrative:      Code 78142  Draw sample # 2 from separate site    Culture, Anaerobe [[id number]] Collected: 08/25/24 1733    Order Status: Completed Specimen: Wound from Abdomen Updated: 08/30/24 1405     Anaerobic Culture No anaerobes isolated    Narrative:      Drive line exit site    Aerobic culture [[id number]]  (Abnormal)  (Susceptibility) Collected: 08/27/24 1446    Order Status: Completed Specimen: Skin from Abdomen Updated: 08/29/24 1424     Aerobic Bacterial Culture STAPHYLOCOCCUS AUREUS  Rare      Narrative:      Driveline site    MRSA/SA Rapid ID by PCR from Blood culture [[id number]]  (Abnormal) Collected: 08/27/24 1802    Order Status: Completed Updated: 08/29/24 0027     Staph aureus ID by PCR  Positive     Methicillin Resistant ID by PCR Negative            I have reviewed all pertinent labs within the past 24 hours.    Estimated Creatinine Clearance: 86.9 mL/min (based on SCr of 1.2 mg/dL).    Diagnostic Results:  I have reviewed and interpreted all pertinent imaging results/findings within the past 24 hours.

## 2024-09-03 NOTE — PROGRESS NOTES
Bedside RN paged noting that screen reading BU battery expiring. Communicated to  Gabe once back in the office Tuesday to assess and see if it needs to be replaced at this time.

## 2024-09-04 ENCOUNTER — TELEPHONE (OUTPATIENT)
Dept: OPHTHALMOLOGY | Facility: CLINIC | Age: 39
End: 2024-09-04
Payer: MEDICAID

## 2024-09-04 LAB
ALBUMIN SERPL BCP-MCNC: 2.6 G/DL (ref 3.5–5.2)
ALP SERPL-CCNC: 237 U/L (ref 55–135)
ALT SERPL W/O P-5'-P-CCNC: 8 U/L (ref 10–44)
ANION GAP SERPL CALC-SCNC: 12 MMOL/L (ref 8–16)
APTT PPP: 41.3 SEC (ref 21–32)
AST SERPL-CCNC: 30 U/L (ref 10–40)
BACTERIA BLD CULT: NORMAL
BASOPHILS # BLD AUTO: 0.03 K/UL (ref 0–0.2)
BASOPHILS NFR BLD: 0.2 % (ref 0–1.9)
BILIRUB DIRECT SERPL-MCNC: 1.2 MG/DL (ref 0.1–0.3)
BILIRUB SERPL-MCNC: 1.7 MG/DL (ref 0.1–1)
BNP SERPL-MCNC: 1170 PG/ML (ref 0–99)
BUN SERPL-MCNC: 18 MG/DL (ref 6–20)
CALCIUM SERPL-MCNC: 9.6 MG/DL (ref 8.7–10.5)
CHLORIDE SERPL-SCNC: 92 MMOL/L (ref 95–110)
CO2 SERPL-SCNC: 25 MMOL/L (ref 23–29)
CREAT SERPL-MCNC: 1.2 MG/DL (ref 0.5–1.4)
CRP SERPL-MCNC: 31.7 MG/L (ref 0–8.2)
DIFFERENTIAL METHOD BLD: ABNORMAL
EOSINOPHIL # BLD AUTO: 0 K/UL (ref 0–0.5)
EOSINOPHIL NFR BLD: 0.2 % (ref 0–8)
ERYTHROCYTE [DISTWIDTH] IN BLOOD BY AUTOMATED COUNT: 26.7 % (ref 11.5–14.5)
EST. GFR  (NO RACE VARIABLE): >60 ML/MIN/1.73 M^2
GLUCOSE SERPL-MCNC: 70 MG/DL (ref 70–110)
HCT VFR BLD AUTO: 26.8 % (ref 40–54)
HGB BLD-MCNC: 7.5 G/DL (ref 14–18)
IMM GRANULOCYTES # BLD AUTO: 0.06 K/UL (ref 0–0.04)
IMM GRANULOCYTES NFR BLD AUTO: 0.5 % (ref 0–0.5)
INR PPP: 3.2 (ref 0.8–1.2)
LDH SERPL L TO P-CCNC: 319 U/L (ref 110–260)
LYMPHOCYTES # BLD AUTO: 1.2 K/UL (ref 1–4.8)
LYMPHOCYTES NFR BLD: 9.8 % (ref 18–48)
MAGNESIUM SERPL-MCNC: 1.8 MG/DL (ref 1.6–2.6)
MCH RBC QN AUTO: 18.7 PG (ref 27–31)
MCHC RBC AUTO-ENTMCNC: 28 G/DL (ref 32–36)
MCV RBC AUTO: 67 FL (ref 82–98)
MONOCYTES # BLD AUTO: 0.9 K/UL (ref 0.3–1)
MONOCYTES NFR BLD: 7.4 % (ref 4–15)
NEUTROPHILS # BLD AUTO: 10 K/UL (ref 1.8–7.7)
NEUTROPHILS NFR BLD: 81.9 % (ref 38–73)
NRBC BLD-RTO: 1 /100 WBC
PHOSPHATE SERPL-MCNC: 3.6 MG/DL (ref 2.7–4.5)
PLATELET # BLD AUTO: 597 K/UL (ref 150–450)
PMV BLD AUTO: 9.3 FL (ref 9.2–12.9)
POCT GLUCOSE: 111 MG/DL (ref 70–110)
POCT GLUCOSE: 149 MG/DL (ref 70–110)
POCT GLUCOSE: 370 MG/DL (ref 70–110)
POCT GLUCOSE: 89 MG/DL (ref 70–110)
POCT GLUCOSE: >500 MG/DL (ref 70–110)
POTASSIUM SERPL-SCNC: 3.9 MMOL/L (ref 3.5–5.1)
PREALB SERPL-MCNC: 10 MG/DL (ref 20–43)
PROT SERPL-MCNC: 8.5 G/DL (ref 6–8.4)
PROTHROMBIN TIME: 32.9 SEC (ref 9–12.5)
RBC # BLD AUTO: 4.02 M/UL (ref 4.6–6.2)
SODIUM SERPL-SCNC: 129 MMOL/L (ref 136–145)
WBC # BLD AUTO: 12.17 K/UL (ref 3.9–12.7)

## 2024-09-04 PROCEDURE — 83735 ASSAY OF MAGNESIUM: CPT | Performed by: STUDENT IN AN ORGANIZED HEALTH CARE EDUCATION/TRAINING PROGRAM

## 2024-09-04 PROCEDURE — 25000003 PHARM REV CODE 250: Performed by: INTERNAL MEDICINE

## 2024-09-04 PROCEDURE — 84134 ASSAY OF PREALBUMIN: CPT | Performed by: STUDENT IN AN ORGANIZED HEALTH CARE EDUCATION/TRAINING PROGRAM

## 2024-09-04 PROCEDURE — 85610 PROTHROMBIN TIME: CPT | Performed by: STUDENT IN AN ORGANIZED HEALTH CARE EDUCATION/TRAINING PROGRAM

## 2024-09-04 PROCEDURE — 63600175 PHARM REV CODE 636 W HCPCS: Performed by: STUDENT IN AN ORGANIZED HEALTH CARE EDUCATION/TRAINING PROGRAM

## 2024-09-04 PROCEDURE — 27000248 HC VAD-ADDITIONAL DAY

## 2024-09-04 PROCEDURE — 85730 THROMBOPLASTIN TIME PARTIAL: CPT | Performed by: STUDENT IN AN ORGANIZED HEALTH CARE EDUCATION/TRAINING PROGRAM

## 2024-09-04 PROCEDURE — 25000003 PHARM REV CODE 250

## 2024-09-04 PROCEDURE — 80048 BASIC METABOLIC PNL TOTAL CA: CPT | Performed by: STUDENT IN AN ORGANIZED HEALTH CARE EDUCATION/TRAINING PROGRAM

## 2024-09-04 PROCEDURE — 85025 COMPLETE CBC W/AUTO DIFF WBC: CPT | Performed by: STUDENT IN AN ORGANIZED HEALTH CARE EDUCATION/TRAINING PROGRAM

## 2024-09-04 PROCEDURE — 99233 SBSQ HOSP IP/OBS HIGH 50: CPT | Mod: ,,, | Performed by: PHYSICIAN ASSISTANT

## 2024-09-04 PROCEDURE — 93750 INTERROGATION VAD IN PERSON: CPT | Mod: ,,, | Performed by: INTERNAL MEDICINE

## 2024-09-04 PROCEDURE — 83880 ASSAY OF NATRIURETIC PEPTIDE: CPT | Performed by: STUDENT IN AN ORGANIZED HEALTH CARE EDUCATION/TRAINING PROGRAM

## 2024-09-04 PROCEDURE — 25000003 PHARM REV CODE 250: Performed by: STUDENT IN AN ORGANIZED HEALTH CARE EDUCATION/TRAINING PROGRAM

## 2024-09-04 PROCEDURE — 99232 SBSQ HOSP IP/OBS MODERATE 35: CPT | Mod: ,,, | Performed by: NURSE PRACTITIONER

## 2024-09-04 PROCEDURE — 99233 SBSQ HOSP IP/OBS HIGH 50: CPT | Mod: ,,, | Performed by: INTERNAL MEDICINE

## 2024-09-04 PROCEDURE — 63600175 PHARM REV CODE 636 W HCPCS: Performed by: INTERNAL MEDICINE

## 2024-09-04 PROCEDURE — 84100 ASSAY OF PHOSPHORUS: CPT | Performed by: STUDENT IN AN ORGANIZED HEALTH CARE EDUCATION/TRAINING PROGRAM

## 2024-09-04 PROCEDURE — 80076 HEPATIC FUNCTION PANEL: CPT | Performed by: STUDENT IN AN ORGANIZED HEALTH CARE EDUCATION/TRAINING PROGRAM

## 2024-09-04 PROCEDURE — 83615 LACTATE (LD) (LDH) ENZYME: CPT | Performed by: STUDENT IN AN ORGANIZED HEALTH CARE EDUCATION/TRAINING PROGRAM

## 2024-09-04 PROCEDURE — 20600001 HC STEP DOWN PRIVATE ROOM

## 2024-09-04 PROCEDURE — 87040 BLOOD CULTURE FOR BACTERIA: CPT | Performed by: INTERNAL MEDICINE

## 2024-09-04 PROCEDURE — 86140 C-REACTIVE PROTEIN: CPT | Performed by: STUDENT IN AN ORGANIZED HEALTH CARE EDUCATION/TRAINING PROGRAM

## 2024-09-04 PROCEDURE — 25000003 PHARM REV CODE 250: Performed by: PHYSICIAN ASSISTANT

## 2024-09-04 RX ORDER — WARFARIN 1 MG/1
1 TABLET ORAL DAILY
Status: DISCONTINUED | OUTPATIENT
Start: 2024-09-04 | End: 2024-09-05

## 2024-09-04 RX ORDER — INSULIN GLARGINE 100 [IU]/ML
12 INJECTION, SOLUTION SUBCUTANEOUS 2 TIMES DAILY
Status: DISCONTINUED | OUTPATIENT
Start: 2024-09-04 | End: 2024-09-05

## 2024-09-04 RX ADMIN — INSULIN ASPART 1 UNITS: 100 INJECTION, SOLUTION INTRAVENOUS; SUBCUTANEOUS at 09:09

## 2024-09-04 RX ADMIN — Medication 400 MG: at 08:09

## 2024-09-04 RX ADMIN — WARFARIN SODIUM 1 MG: 1 TABLET ORAL at 04:09

## 2024-09-04 RX ADMIN — INSULIN ASPART 10 UNITS: 100 INJECTION, SOLUTION INTRAVENOUS; SUBCUTANEOUS at 04:09

## 2024-09-04 RX ADMIN — AMIODARONE HYDROCHLORIDE 400 MG: 200 TABLET ORAL at 08:09

## 2024-09-04 RX ADMIN — DULOXETINE HYDROCHLORIDE 30 MG: 30 CAPSULE, DELAYED RELEASE ORAL at 08:09

## 2024-09-04 RX ADMIN — GABAPENTIN 100 MG: 100 CAPSULE ORAL at 08:09

## 2024-09-04 RX ADMIN — ASPIRIN 81 MG: 81 TABLET, COATED ORAL at 08:09

## 2024-09-04 RX ADMIN — CEFAZOLIN 2 G: 2 INJECTION, POWDER, FOR SOLUTION INTRAMUSCULAR; INTRAVENOUS at 04:09

## 2024-09-04 RX ADMIN — GABAPENTIN 100 MG: 100 CAPSULE ORAL at 11:09

## 2024-09-04 RX ADMIN — FLUCONAZOLE 400 MG: 2 INJECTION, SOLUTION INTRAVENOUS at 02:09

## 2024-09-04 RX ADMIN — ATORVASTATIN CALCIUM 40 MG: 20 TABLET, FILM COATED ORAL at 08:09

## 2024-09-04 RX ADMIN — BUMETANIDE 2 MG: 1 TABLET ORAL at 04:09

## 2024-09-04 RX ADMIN — AMIODARONE HYDROCHLORIDE 400 MG: 200 TABLET ORAL at 09:09

## 2024-09-04 RX ADMIN — INSULIN GLARGINE 14 UNITS: 100 INJECTION, SOLUTION SUBCUTANEOUS at 08:09

## 2024-09-04 RX ADMIN — CYCLOBENZAPRINE HYDROCHLORIDE 5 MG: 5 TABLET, FILM COATED ORAL at 09:09

## 2024-09-04 RX ADMIN — INSULIN ASPART 10 UNITS: 100 INJECTION, SOLUTION INTRAVENOUS; SUBCUTANEOUS at 11:09

## 2024-09-04 RX ADMIN — LEVETIRACETAM 1500 MG: 500 TABLET, FILM COATED ORAL at 08:09

## 2024-09-04 RX ADMIN — INSULIN GLARGINE 12 UNITS: 100 INJECTION, SOLUTION SUBCUTANEOUS at 09:09

## 2024-09-04 RX ADMIN — AMLODIPINE BESYLATE 5 MG: 5 TABLET ORAL at 08:09

## 2024-09-04 RX ADMIN — BUMETANIDE 2 MG: 1 TABLET ORAL at 08:09

## 2024-09-04 RX ADMIN — CYCLOBENZAPRINE HYDROCHLORIDE 5 MG: 5 TABLET, FILM COATED ORAL at 08:09

## 2024-09-04 RX ADMIN — PANTOPRAZOLE SODIUM 40 MG: 40 TABLET, DELAYED RELEASE ORAL at 08:09

## 2024-09-04 RX ADMIN — CEFAZOLIN 2 G: 2 INJECTION, POWDER, FOR SOLUTION INTRAMUSCULAR; INTRAVENOUS at 09:09

## 2024-09-04 RX ADMIN — OXYCODONE HYDROCHLORIDE 10 MG: 10 TABLET, FILM COATED, EXTENDED RELEASE ORAL at 09:09

## 2024-09-04 RX ADMIN — CEFAZOLIN 2 G: 2 INJECTION, POWDER, FOR SOLUTION INTRAMUSCULAR; INTRAVENOUS at 11:09

## 2024-09-04 RX ADMIN — LEVETIRACETAM 1500 MG: 500 TABLET, FILM COATED ORAL at 09:09

## 2024-09-04 RX ADMIN — GABAPENTIN 400 MG: 400 CAPSULE ORAL at 09:09

## 2024-09-04 RX ADMIN — ACETAMINOPHEN 650 MG: 325 TABLET ORAL at 05:09

## 2024-09-04 RX ADMIN — EPLERENONE 25 MG: 25 TABLET, FILM COATED ORAL at 08:09

## 2024-09-04 NOTE — PROGRESS NOTES
[facility] - [facility]iology Stepdown  Heart Transplant  Progress Note    Patient Name: Kevan Queen  MRN: [id number]  Admission Date: 8/25/2024  Hospital Length of Stay: 10 days  Attending Physician: Natalya Villatoro MD  Primary Care Provider: Rosio Armendariz FNP  Principal Problem:LVAD (left ventricular assist device) present    Subjective:   Interval History:   Pt doing well overnight. Back is still bothering him but is stable. Appears compensated. Repeat Bcx's sent, micafungin stopped in favor of fluconazole while waiting for fungemia susceptibilities.     Continuous Infusions: none       Scheduled Meds:   [START ON 9/9/2024] amiodarone  200 mg Oral Daily    amiodarone  400 mg Oral BID    amLODIPine  5 mg Oral Daily    aspirin  81 mg Oral Daily    atorvastatin  40 mg Oral Daily    bumetanide  2 mg Oral BID loop    ceFAZolin (Ancef) IV (PEDS and ADULTS)  2 g Intravenous Q8H    cyclobenzaprine  5 mg Oral BID    DULoxetine  30 mg Oral Daily    eplerenone  25 mg Oral Daily    fluconazole (DIFLUCAN) IV (PEDS and ADULTS)  400 mg Intravenous Once    gabapentin  100 mg Oral BID    gabapentin  400 mg Oral QHS    insulin aspart U-100  10 Units Subcutaneous TIDWM    insulin glargine U-100  12 Units Subcutaneous BID    levETIRAcetam  1,500 mg Oral BID    LIDOcaine  2 patch Transdermal Q24H    magnesium oxide  400 mg Oral Daily    nicotine  1 patch Transdermal Daily    pantoprazole  40 mg Oral Daily    polyethylene glycol  17 g Oral Daily    warfarin  1 mg Oral Every Mon, Wed, Fri    warfarin  2 mg Oral Every Tues, Thurs, Sat, Sun     PRN Meds:  Current Facility-Administered Medications:     acetaminophen, 650 mg, Oral, Q6H PRN    dextrose 10%, 12.5 g, Intravenous, PRN    dextrose 10%, 25 g, Intravenous, PRN    glucagon (human recombinant), 1 mg, Intramuscular, PRN    glucose, 16 g, Oral, PRN    glucose, 24 g, Oral, PRN    insulin aspart U-100, 0-10 Units, Subcutaneous, QID (AC + HS) PRN    ondansetron, 4 mg, Oral, Q8H  PRN    oxyCODONE, 10 mg, Oral, Daily PRN    Review of patient's allergies indicates:   Allergen Reactions    Torsemide Hives     Objective:     Vital Signs (Most Recent):  Temp: 98.2 °F (36.8 °C) (09/04/24 1150)  Pulse: 82 (09/04/24 1150)  Resp: 18 (09/04/24 1150)  BP: (!) 86/0 (09/04/24 1147)  SpO2: 99 % (09/04/24 1150) Vital Signs (24h Range):  Temp:  [97.8 °F (36.6 °C)-98.6 °F (37 °C)] 98.2 °F (36.8 °C)  Pulse:  [76-92] 82  Resp:  [16-22] 18  SpO2:  [95 %-100 %] 99 %  BP: ()/(0-71) 86/0     Patient Vitals for the past 72 hrs (Last 3 readings):   Weight   09/03/24 0515 74.3 kg (163 lb 12.8 oz)   09/02/24 1145 73.1 kg (161 lb 2.5 oz)       Body mass index is 20.47 kg/m².      Intake/Output Summary (Last 24 hours) at 9/4/2024 1354  Last data filed at 9/4/2024 1231  Gross per 24 hour   Intake 1442 ml   Output 3275 ml   Net -1833 ml       Hemodynamic Parameters:       EKG- without ischemic changes this AM        Physical Exam  Constitutional:       General: He is not in acute distress.     Appearance: He is normal weight.   HENT:      Head: Normocephalic and atraumatic.      Right Ear: External ear normal.      Left Ear: External ear normal.      Nose: Nose normal.      Mouth/Throat:      Pharynx: Oropharynx is clear.   Cardiovascular:      Comments: Normal VAD hum, JVD present but improving   Pulmonary:      Effort: Pulmonary effort is normal.   Abdominal:      General: Abdomen is flat. Bowel sounds are normal. There is no distension (Distended but soft.).      Palpations: Abdomen is soft. There is no mass.      Tenderness: There is no abdominal tenderness. There is right CVA tenderness.   Musculoskeletal:      Cervical back: Normal range of motion.      Right lower leg: No edema.      Left lower leg: No edema.      Comments: Cont b/l back pain- upper spine and b/l paraspinal/upper back muscular pain.    Skin:     General: Skin is warm.      Comments: Chest wall on the right side with gynecomastia, no overlying  skin changes, no tenderness at the site however palpable mass versus nodule present.   Neurological:      Mental Status: He is alert. Mental status is at baseline.            Significant Labs:  CBC:  Recent Labs   Lab 09/02/24  0601 09/03/24 0334 09/04/24 0217   WBC 12.97* 9.79 12.17   RBC 4.03* 4.04* 4.02*   HGB 7.8* 7.8* 7.5*   HCT 26.9* 26.9* 26.8*   * 602* 597*   MCV 67* 67* 67*   MCH 19.4* 19.3* 18.7*   MCHC 29.0* 29.0* 28.0*     BNP:  Recent Labs   Lab 08/30/24  0308 09/02/24 0205 09/04/24 0217   * 1,053* 1,170*     CMP:  Recent Labs   Lab 08/30/24 0308 08/31/24 0244 09/02/24 0205 09/03/24 0334 09/04/24 0217 09/04/24 0218   *   < > 41* 143*  --  70   CALCIUM 9.1   < > 9.1 9.9  --  9.6   ALBUMIN 2.5*  --  2.6*  --  2.6*  --    PROT 8.5*  --  8.5*  --  8.5*  --    *   < > 130* 128*  --  129*   K 4.1   < > 3.4* 4.9  --  3.9   CO2 25   < > 26 27  --  25   CL 95   < > 93* 91*  --  92*   BUN 21*   < > 20 22*  --  18   CREATININE 1.3   < > 1.2 1.2  --  1.2   ALKPHOS 221*  --  230*  --  237*  --    ALT 11  --  8*  --  8*  --    AST 31  --  31  --  30  --    BILITOT 2.0*  --  1.9*  --  1.7*  --     < > = values in this interval not displayed.      Coagulation:   Recent Labs   Lab 09/02/24 0205 09/03/24 0334 09/04/24 0218   INR 2.8* 3.3* 3.2*   APTT 40.9* 41.5* 41.3*     LDH:  Recent Labs   Lab 09/02/24  0205 09/03/24  0334 09/04/24  0218   * 300* 319*     Microbiology:  Microbiology Results (last 7 days)       Procedure Component Value Units Date/Time    Blood culture [[id number]]  (Abnormal) Collected: 08/27/24 1751    Order Status: Completed Specimen: Blood Updated: 09/04/24 1339     Blood Culture, Routine Gram stain dudley bottle: Gram positive cocci in clusters resembling Staph      Results called to and read back by: Lorri Riggs RN  08/29/2024   13:50      Gram stain aer bottle: yeast      Results called to and read back by:Lorri Riggs RN 08/30/2024      STAPHYLOCOCCUS  AUREUS  ID consult required at [facility].  For susceptibility see order #[id number]        YEAST   Identification pending  For susceptibility see order #[id number]  ID consult required at [facility].      Narrative:      Blood culture # 2 different location.    Blood culture [[id number]]  (Abnormal) Collected: 09/01/24 0939    Order Status: Completed Specimen: Blood Updated: 09/04/24 1317     Blood Culture, Routine Gram stain dudley bottle: yeast      Results called to and read back by: Rufino 09/03/2024  06:15      CANDIDA PARAPSILOSIS  For susceptibility see order #[id number]  ID consult required at [facility].      Blood culture [[id number]] Collected: 09/04/24 0217    Order Status: Completed Specimen: Blood Updated: 09/04/24 1315     Blood Culture, Routine No Growth to date    Blood culture [[id number]]  (Abnormal) Collected: 09/01/24 0939    Order Status: Completed Specimen: Blood Updated: 09/04/24 1015     Blood Culture, Routine Gram stain aer bottle: yeast      Gram stain dudley bottle: yeast      Results called to and read back by: Rufino 09/03/2024  01:45      YEAST   Identification pending  ID consult required at [facility].  For susceptibility see order #[id number]      Blood culture [[id number]] Collected: 08/30/24 1152    Order Status: Completed Specimen: Blood Updated: 09/03/24 1412     Blood Culture, Routine No Growth to date      No Growth to date      No Growth to date      No Growth to date      No Growth to date    Narrative:      Rt AC    Blood culture [[id number]]  (Abnormal) Collected: 08/28/24 2226    Order Status: Completed Specimen: Blood Updated: 09/03/24 1014     Blood Culture, Routine Gram stain aer bottle: yeast      Gram stain dudley bottle: yeast      Positive results previously called 08/30/2024      YEAST   Identification pending  For susceptibility see order  #[id number]  ID consult required at [facility].      Narrative:      Code 36324  Draw sample # 2 from separate site    Blood culture [[id number]]  (Abnormal) Collected: 08/28/24 2226    Order Status: Completed Specimen: Blood Updated: 09/03/24 1013     Blood Culture, Routine Gram stain aer bottle: yeast      Results called to and read back by:Lorri Riggs RN 08/30/2024  17:10      YEAST   Identification pending  For susceptibility see order #[id number]  ID consult required at [facility].      Narrative:      Code 79505  Draw sample # 2 from separate site    Blood culture [[id number]]  (Abnormal) Collected: 08/30/24 1152    Order Status: Completed Specimen: Blood Updated: 09/03/24 0856     Blood Culture, Routine Gram stain dudley bottle: yeast      Results called to and read back by: Rufino 09/01/2024  02:03      CANDIDA PARAPSILOSIS  ID consult required at [facility].  Susceptibility pending      Narrative:      Lft AC    Culture, Anaerobe [[id number]] Collected: 08/27/24 1446    Order Status: Completed Specimen: Skin from Abdomen Updated: 09/02/24 0936     Anaerobic Culture No anaerobes isolated    N[facility]ative[facility]    Blood culture [[id number]]  (Abnormal)  (Susceptibility) Collected: 08/27/24 1802    Order Status: Completed Specimen: Blood Updated: 08/31/24 1049     Blood Culture, Routine Gram stain aer bottle: Gram positive cocci in clusters resembling Staph      Results called to and read back by:Miguelina [facility] RN 08/28/2024  22:59      MICROCOCCUS SPECIES  Organism is a probable contaminant        STAPHYLOCOCCUS AUREUS  ID consult required at [facility].      Rapid Organism ID by PCR (from Blood culture) [[id number]] Collected: 08/28/24 2226    Order Status: Completed Updated: 08/30/24 1823     Enterococcus faecalis Not Detected     Enterococcus faecium Not Detected      Listeria monocytogenes Not Detected     Staphylococcus spp. Not Detected     Staphylococcus aureus Not Detected     Staphylococcus epidermidis Not Detected     Staphylococcus lugdunensis Not Detected     Streptococcus species Not Detected     Streptococcus agalactiae Not Detected     Streptococcus pneumoniae Not Detected     Streptococcus pyogenes Not Detected     Acinetobacter calcoaceticus/baumannii complex Not Detected     Bacteroides fragilis Not Detected     Enterobacterales Not Detected     Enterobacter cloacae complex Not Detected     Escherichia coli Not Detected     Klebsiella aerogenes Not Detected     Klebsiella oxytoca Not Detected     Klebsiella pneumoniae group Not Detected     Proteus Not Detected     Salmonella sp Not Detected     Serratia marcescens Not Detected     Haemophilus influenzae Not Detected     Neisseria meningtidis Not Detected     Pseudomonas aeruginosa Not Detected     Stenotrophomonas maltophilia Not Detected     Candida albicans Not Detected     Candida auris Not Detected     Candida glabrata Not Detected     Candida krusei Not Detected     Candida parapsilosis Not Detected     Candida tropicalis Not Detected     Cryptococcus neoformans/gattii Not Detected     CTX-M (ESBL ) Test Not Applicable     IMP (Carbapenem resistant) Test Not Applicable     KPC resistance gene (Carbapenem resistant) Test Not Applicable     mcr-1  Test Not Applicable     mec A/C  Test Not Applicable     mec A/C and MREJ (MRSA) gene Test Not Applicable     NDM (Carbapenem resistant) Test Not Applicable     OXA-48-like (Carbapenem resistant) Test Not Applicable     van A/B (VRE gene) Test Not Applicable     VIM (Carbapenem resistant) Test Not Applicable    Narrative:      Code 69131  Draw sample # 2 from separate site    Culture, Anaerobe [[id number]] Collected: 08/25/24 5720    Order Status: Completed Specimen: Wound from Abdomen Updated: 08/30/24 1405     Anaerobic Culture No anaerobes isolated     Narrative:      Drive line exit site    Aerobic culture [[id number]]  (Abnormal)  (Susceptibility) Collected: 08/27/24 1446    Order Status: Completed Specimen: Skin from Abdomen Updated: 08/29/24 1424     Aerobic Bacterial Culture STAPHYLOCOCCUS AUREUS  Rare      Narrative:      Driveline site    MRSA/SA Rapid ID by PCR from Blood culture [[id number]]  (Abnormal) Collected: 08/27/24 1802    Order Status: Completed Updated: 08/29/24 0027     Staph aureus ID by PCR Positive     Methicillin Resistant ID by PCR Negative            I have reviewed all pertinent labs within the past 24 hours.    Estimated Creatinine Clearance: 86.9 mL/min (based on SCr of 1.2 mg/dL).    Diagnostic Results:  I have reviewed and interpreted all pertinent imaging results/findings within the past 24 hours.  Assessment and Plan:     Mr. Kevan Thacker is a 39 year old male with stage D 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 He underwent RVAD removal and chest closure 6/30/2022.  He was weaned off  but he had to restarted due to RVF. He was eventually transitioned to milrinone (secondary to  shortage) now supposed to be on 0.25 mcg/kg/min. He has a history of unclear syncope vs seizures and is followed by neuro for this and is on Keppra.  He is being admitted after being out of [facility] for [facility] week with ongoing back pain.      On 11/15/23 underwent removal of eroded [employer] scICD--no Lifevest (due to LVAD) and no plan to replace ICD as not requiring therapy post LVAD with concern for reinfection.  He has not had neurology f/u with seizure hx on keppra so coordinator to assist him with obtaining appt.      Reports back pain (primarily upper back pain) for the past 3-4 days. He reports that it  started 2 days after stopping pirmacor and he feels it may be related. He has also been sleeping in the couch and identifies that it may also be related to the back pain. He denies lifting anything heavy or any falls /trauma. No red flags including incontinence of stool or urine. No numbness in the groin area or weakness in the legs or upper extremity reported. He reports he was not able to go for appointment because of transport issue that has now been resolved. He denies orthopnea, PND, weight gain, leg swelling. He says that he does have some shortness of breath after walking long distances which has been ongoing for months now and has not changed lately. He has lost some weight over last few months since his but weight has been stable over the last month. He denied headache, constipation, diarrhea, SOB, cough, palpitations or any additional symptoms.     * LVAD (left ventricular assist device) present  -HeartMate 3 Implanted  6/29/2022  as DT  -HTS Primary  -cont coumadin, Goal INR 2.0-3.0. Supratherapeutic this AM. Letting INR come down some but have resumed coumadin  Lab Results   Component Value Date    INR 3.2 (H) 09/04/2024    INR 3.3 (H) 09/03/2024    INR 2.8 (H) 09/02/2024       -Antiplatelets ASA 81 mg  -LDH is stable overall today. Will continue to monitor daily.  -Speed set at 5100, LSL 4700 rpm  -Interrogation notable for  power cable disconnected, low voltage advisory, PI events  -Not listed for OHTx- advised pt he would not be candidate for OHTx (noncompliance/poor f/up)   -UDS negative  -attempted to call partner Robyn, unfortunately goes to        Procedure: Device Interrogation Including analysis of device parameters  Current Settings: Ventricular Assist Device  Review of device function is stable/unstable stable        9/4/2024    11:48 AM 9/4/2024     8:26 AM 9/4/2024     4:45 AM 9/3/2024    11:45 PM 9/3/2024     7:45 PM 9/3/2024     4:00 PM 9/3/2024    11:52 AM   TXP LVAD INTERROGATIONS    Type HeartMate3 HeartMate3 HeartMate3 HeartMate3 HeartMate3 HeartMate3 HeartMate3   Flow 4 4.3 3.8 4.1 4 4.2 4.1   Speed 5100 5100 5100 5100 5100 5100 5100   PI 6 4.7 5.9 5.5 5.7 5.7 5.9   Power (Serna) 3.5 3.5 3.5 3.5 3.6 3.5 3.5   LSL 4700 4700 4700 4700 4700 4700 4700   Pulsatility   Intermittent pulse Intermittent pulse Intermittent pulse Pulse Pulse         Pulmonary nodules  Multiple pulmonary nodules seen on CT scan with contrast. Pt does have hx tobacco use. Lymph node enlargement noted as well. Pt has weight loss but not drastic  -need f/up scan in 3-6 months to assess stability of nodules     Pyelonephritis  Seen on ct with contrast. ID following.   -cont abx per ID   -no need for percutaneous drain per IR at this time unless pt decompensates  -current bacteremia/yeast not from renal source     Severe sepsis  -CT chest/abdomen and pelvis w/ contrast w/ pyelonephritis and renal abscess   -repeat CT spine with contrast +visualization of kidney read is stable, without worsening pyelo or abscess, no evidence of discitis    Plan:  -F/up cultures- +ve for staph, neg MRSA pcr, yeast now growing in blood as well  -Follow repeat cultures. As of 9/1 still fungemic, susceptibilities pending  -ID following-c/w cefazolin, fluconazole  -C/s to Optho for eval of endopthalmitis in setting of yeast in blood   -Removed PICC on 8/28  -Driveline infection vs pyelo vs PICC related  - TTE negative for endocarditis      Atrial flutter  pt previously went into atach vs aflutter. Pt was started on amiodarone, with rhythm aborted to NSR following bolus. BP remained stable.   -BMP, replace lytes as needed   -cont amiodarone- will complete load and keep on maintenance dosing thereafter  -pt does report intermittently at home having episodes of palpitations that may also represent paroxysmal afib vs flutter at home. Already on a/c. Chadvasc of 5          Polysubstance abuse  Pt has history of polysubstance use.   -UDS negative  -prn  oxycodone for pyelonephritis pain x1 per day max given hx substance use       Subcutaneous nodule of breast  Pt with unilateral R sided gynecomastia with hard nodular feeling beneath soft subcutaneous tissue of breast. No notable skin dimpling or rash.   CT with contrast reporting gyneocomastia without fluid collection/abscess. Pulmonary nodules seen on exam (needs f/up scan in 3-6 mos), enlarged lymph nodes noted on scan as well. D/w pt need for continued f/up outpatient for cancer screening. Appears to be more reactive than malignant on most recent scan.  -transition from spironolactone to epleronone     Supratherapeutic INR  -Goal INR 2-3 , INR 4.7 on admission - improving  - pharmacy  to manage coumadin dosing, cont warfarin   Check INR daily     Lab Results   Component Value Date    INR 3.2 (H) 09/04/2024    INR 3.3 (H) 09/03/2024    INR 2.8 (H) 09/02/2024         Bilateral back pain  Pt reports initial pain following running out of primacor. S/p spinal Xrays without significant abnormality besides degenerative disc disease.  NO red flag symptoms on admission  CT spine cervical, thoracic and lumbar negative for discitis/infectious concern and renal collection/pyelo appears to be unchanged.     Plan:  - Multimodal pain control  - titrate to patient's response  - C/w home gabapentin, tylenol prn and lidocaine patches.   - C/w Prn oxycodone added  - has not been using   - Schedule muscle relaxant overnight and assess for change in symptoms.   - Back pain more pronounced on R side, most likely cause is his pyelonephritis  -c/w abx for pyelonephritis        Hypokalemia  Replace as needed and monitor      Type 2 diabetes mellitus with hyperglycemia, with long-term current use of insulin  A1C 10.3  Endocrinology consulted for glucose management, largely uncontrolled       Lab Results   Component Value Date    HGBA1C 10.3 (H) 08/25/2024    HGBA1C >14.0 (H) 04/26/2024    HGBA1C >14.0 (H) 02/17/2024    [id number] 7.5  06/12/2023    [id number] 8.5 02/07/2023           Moderate malnutrition  -pt with aggressive care goals  -c/s nutrition, appreciate assistance     Seizure-like activity  History   C/w home keppra 1.5 gm BID    CHF (NYHA class IV, ACC/AHA stage D)    Updated 8/26/24:  EF 5-10% global hypokinesis, RV enlargement and global hypokinesis, biatrial enlargement, mild-to-moderate MR, severe TR, no van HeartMate 3 in place, aortic valve does not open, LVEDd 7.2  Previous TTE 9/5/23 with EF 10-15%, mod to sever TR, LVEDd 7.11, LVAD - HM3 in place    -continue on bumex 2mg BID, cont inpatient treatment for bacteremia and fungemia   -GDMT: d/c spironolactone given possible gynecomastia, transition to epleronone   -Previously on home Primacor 0.25 at home but ran out approx 5 days prior to admission and also reports he occasionally disconnects himself from his pump to perform certain activities. Will plan to d/c off primacor   - TTE With LVEF 5-10%, poor RV function, dilation, no signs of endocarditis on TTE   -palliative care c/s and pt discussed with service, appreciate f/up- plan for continued follow up with palliative outpatient as well with Dr. Carr   -repeated [facility] discussion on 8/31- full code, aggressive care, pt educated about compliance going forward - re-iterated. Pt understanding, apologetic for previous noncompliance. Pt understands he is not a candidate for heart transplant and will not be evaluated due to his noncompliance.           Jeff Spencer PA-C  Heart Transplant  [facility] - Cardiology Stepdown

## 2024-09-04 NOTE — ASSESSMENT & PLAN NOTE
-HeartMate 3 Implanted  6/29/2022  as DT  -HTS Primary  -cont coumadin, Goal INR 2.0-3.0. Supratherapeutic this AM. Letting INR come down some but have resumed coumadin  Lab Results   Component Value Date    INR 3.2 (H) 09/04/2024    INR 3.3 (H) 09/03/2024    INR 2.8 (H) 09/02/2024       -Antiplatelets ASA 81 mg  -LDH is stable overall today. Will continue to monitor daily.  -Speed set at 5100, LSL 4700 rpm  -Interrogation notable for  power cable disconnected, low voltage advisory, PI events  -Not listed for OHTx- advised pt he would not be candidate for OHTx (noncompliance/poor f/up)   -UDS negative  -attempted to call partner Robyn, unfortunately goes to        Procedure: Device Interrogation Including analysis of device parameters  Current Settings: Ventricular Assist Device  Review of device function is stable/unstable stable        9/4/2024    11:48 AM 9/4/2024     8:26 AM 9/4/2024     4:45 AM 9/3/2024    11:45 PM 9/3/2024     7:45 PM 9/3/2024     4:00 PM 9/3/2024    11:52 AM   TXP LVAD INTERROGATIONS   Type HeartMate3 HeartMate3 HeartMate3 HeartMate3 HeartMate3 HeartMate3 HeartMate3   Flow 4 4.3 3.8 4.1 4 4.2 4.1   Speed 5100 5100 5100 5100 5100 5100 5100   PI 6 4.7 5.9 5.5 5.7 5.7 5.9   Power (Serna) 3.5 3.5 3.5 3.5 3.6 3.5 3.5   LSL 4700 4700 4700 4700 4700 4700 4700   Pulsatility   Intermittent pulse Intermittent pulse Intermittent pulse Pulse Pulse

## 2024-09-04 NOTE — SUBJECTIVE & OBJECTIVE
"Interval HPI:   Overnight events: Remains in CSU. BG at or slightly below goal ranges on current SQ insulin regimen. Diet diabetic Cardiac (Low Na/Chol); 2000 Calorie; Fluid - 1500mL; Standard Tray    Eatin%  Nausea: No  Hypoglycemia and intervention: No  Fever: No  TPN and/or TF: No  If yes, type of TF/TPN and rate: n/a    BP (!) 86/0 (BP Location: Left arm)   Pulse 82   Temp 98.2 °F (36.8 °C) (Oral)   Resp 18   Ht 6' 3" (1.905 m)   Wt 74.3 kg (163 lb 12.8 oz)   SpO2 99%   BMI 20.47 kg/m²     Labs Reviewed and Include    Recent Labs   Lab 24  02124   GLU  --  70   CALCIUM  --  9.6   ALBUMIN 2.6*  --    PROT 8.5*  --    NA  --  129*   K  --  3.9   CO2  --  25   CL  --  92*   BUN  --  18   CREATININE  --  1.2   ALKPHOS 237*  --    ALT 8*  --    AST 30  --    BILITOT 1.7*  --      Lab Results   Component Value Date    WBC 12.17 2024    HGB 7.5 (L) 2024    HCT 26.8 (L) 2024    MCV 67 (L) 2024     (H) 2024     No results for input(s): "TSH", "FREET4" in the last 168 hours.  Lab Results   Component Value Date    HGBA1C 10.3 (H) 2024       Nutritional status:   Body mass index is 20.47 kg/m².  Lab Results   Component Value Date    ALBUMIN 2.6 (L) 2024    ALBUMIN 2.6 (L) 2024    ALBUMIN 2.5 (L) 2024     Lab Results   Component Value Date    PREALBUMIN 10 (L) 2024    PREALBUMIN 10 (L) 2024    PREALBUMIN 9 (L) 2024       Estimated Creatinine Clearance: 86.9 mL/min (based on SCr of 1.2 mg/dL).    Accu-Checks  Recent Labs     24  1043 24  1140 24  1610 24  23224  0627 24  1124 24  1632 24  19424  0705   POCTGLUCOSE 294* 282* 247* 123* 109 185* 156* 223* 115* 89       Current Medications and/or Treatments Impacting Glycemic Control  Immunotherapy:    Immunosuppressants       None          Steroids:   Hormones (From admission, onward)      None "          Pressors:    Autonomic Drugs (From admission, onward)      None          Hyperglycemia/Diabetes Medications:   Antihyperglycemics (From admission, onward)      Start     Stop Route Frequency Ordered    09/02/24 1130  insulin aspart U-100 pen 10 Units         -- SubQ 3 times daily with meals 09/02/24 0841    09/02/24 0900  insulin glargine U-100 (Lantus) pen 14 Units         -- SubQ 2 times daily 09/02/24 0838    08/25/24 1506  insulin aspart U-100 pen 0-10 Units         -- SubQ Before meals & nightly PRN 08/25/24 1407

## 2024-09-04 NOTE — PROGRESS NOTES
[facility] - Cardiology Stepdown  Infectious Disease  Progress Note    Patient Name: Kevan Queen  MRN: [id number]  Admission Date: 8/25/2024  Length of Stay: 10 days  Attending Physician: Natalya Villatoro MD  Primary Care Provider: Rosio Armendariz FNP    Isolation Status: No active isolations  Assessment/Plan:      Cardiac/Vascular  * LVAD (left ventricular assist device) present  MSSA DLESI  Bacteremia  Pyelnephritis/renal abscess  Fungemia    40 yo male with LVAD HM3 as DT, prior MSSA DLESI/bacteremia c/b empyema (on suppressive cefadroxil) and eroded ICD/pocket infection s/p removal, seizure admitted for back pain. Hospital course notable for blcx with micrococcus (suspect contaminant), MSSA bacteremia (suspect due to DLES), and c parapsilosis fungemia (suspect 2/2 to prior picc vs pyelo). DLES also with MSSA.  CT with contrast now with irregular nonenhancing renal lesion c/f pyelonephritis or potential abscess, R chest lesion c/f gynecomastia. Old PICC pulled 8/29. TTE without IE. S/p ophthalmology evaluation, no evidence of endophthalmitis. CT spine without evidence of OM/discitis.     BCx 9/1 still growing yeast. Concern persistent fungemia is due to antifungal resistance vs inadequate source control.    Recommendations:  -continue cefazolin 2g q8hr IV to target MSSA  -continue fluconazole  -monitor for QTc prolongation  -echo to eval for endocarditis given persistent fungemia. Will help determine duration of antifungal therapy.  -f/u BCx to assess for clearance of fungemia             A/p discussed with primary team.     Anticipated Disposition: tbd    Thank you for your consult. I will follow-up with patient. Please contact us if you have any additional questions.    Kavya Carrion MD  Infectious Disease  [facility] - [facility] Step[facility]    Subjective:     Principal Problem:LVAD (left ventricular assist device) present    HPI: 40 yo male with LVAD HM3 as DT, prior MSSA DLESI/bacteremia c/b  empyema (on suppressive cefadroxil) and eroded ICD/pocket infection s/p removal, seizure admitted for back pain. ID consulted for abx recs. Hospital course notable for US of chest with hypoechoic area of unclear significance. DLES cx in process. Pt reported adherence to cefadroxil - denied issues with it. Denied fevers, chills, abdominal complaints or worsening drainage from DLES. Pt reported that he ran out of his primacor and had some back pain soon after. Denied problems with PICC. Reported swelling under R breast - pt states he is unclear how long he's had the swelling.            Interval History: No acute events overnight. No new complaints.    Review of Systems   Constitutional:  Negative for chills and fever.   Eyes:  Negative for visual disturbance.   Genitourinary:  Negative for difficulty urinating and dysuria.   Musculoskeletal:  Positive for back pain.   All other systems reviewed and are negative.    Objective:     Vital Signs (Most Recent):  Temp: 98.2 °F (36.8 °C) (09/04/24 1150)  Pulse: 82 (09/04/24 1150)  Resp: 18 (09/04/24 1150)  BP: (!) 86/0 (09/04/24 1147)  SpO2: 99 % (09/04/24 1150) Vital Signs (24h Range):  Temp:  [97.8 °F (36.6 °C)-98.6 °F (37 °C)] 98.2 °F (36.8 °C)  Pulse:  [76-90] 82  Resp:  [16-22] 18  SpO2:  [95 %-100 %] 99 %  BP: ()/(0-71) 86/0     Weight:  (pt stated his back hurts to bad to stand.)  Body mass index is 20.47 kg/m².    Estimated Creatinine Clearance: 86.9 mL/min (based on SCr of 1.2 mg/dL).     Physical Exam  Constitutional:       General: He is not in acute distress.     Appearance: He is not ill-appearing or toxic-appearing.   Eyes:      General:         Right eye: No discharge.         Left eye: No discharge.   Cardiovascular:      Comments: LVAD hum present  Pulmonary:      Effort: Pulmonary effort is normal. No respiratory distress.   Abdominal:      General: There is no distension.      Palpations: Abdomen is soft.      Tenderness: There is no right CVA  tenderness or left CVA tenderness.      Comments: LVAD dressed   Musculoskeletal:         General: Tenderness (lower back pain) present.   Skin:     General: Skin is warm and dry.   Neurological:      Mental Status: He is alert and oriented to person, place, and time.          Significant Labs: Blood Culture:   Recent Labs   Lab 08/27/24  1802 08/28/24  2226 08/30/24  1152 09/01/24  0939 09/04/24  0217   LABBLOO Gram stain aer bottle: Gram positive cocci in clusters resembling Staph  Results called to and read back by:Miguelina [facility] RN 08/28/2024  22:59  MICROCOCCUS SPECIES  Organism is a probable contaminant  *  STAPHYLOCOCCUS AUREUS  ID consult required at [facility].  * Gram stain aer bottle: yeast  Gram stain dduley bottle: yeast  Positive results previously called 08/30/2024  YEAST   Identification pending  For susceptibility see order #[id number]  ID consult required at [facility].  *  Gram stain aer bottle: yeast  Results called to and read back by:Lorri Riggs RN 08/30/2024  17:10  YEAST   Identification pending  For susceptibility see order #[id number]  ID consult required at [facility].  * No growth after 5 days.  Gram stain dudley bottle: yeast  Results called to and read back by: Rufino 09/01/2024  02:03  CANDIDA PARAPSILOSIS  ID consult required at [facility].  Susceptibility pending  * Gram stain dudley bottle: yeast  Results called to and read back by: Rufino 09/03/2024  06:15  CANDIDA PARAPSILOSIS  For susceptibility see order #[id number]  ID consult required at [facility].  *  Gram stain aer bottle: yeast  Gram stain dudley bottle: yeast  Results called to and read back by: Rufino 09/03/2024  01:45  YEAST   Identification pending  ID consult required at [facility].  For susceptibility see order  #[id number]  * No Growth to date     Wound Culture:   Recent Labs   Lab 05/20/24  1653 08/27/24  1446   LABAERO No growth STAPHYLOCOCCUS AUREUS  Rare  *     All pertinent labs within the past 24 hours have been reviewed.    Significant Imaging: I have reviewed all pertinent imaging results/findings within the past 24 hours.

## 2024-09-04 NOTE — SUBJECTIVE & OBJECTIVE
Interval History:   Pt doing well overnight. Back is still bothering him but is stable. Appears compensated. Repeat Bcx's sent, micafungin stopped in favor of fluconazole while waiting for fungemia susceptibilities.     Continuous Infusions: none       Scheduled Meds:   [START ON 9/9/2024] amiodarone  200 mg Oral Daily    amiodarone  400 mg Oral BID    amLODIPine  5 mg Oral Daily    aspirin  81 mg Oral Daily    atorvastatin  40 mg Oral Daily    bumetanide  2 mg Oral BID loop    ceFAZolin (Ancef) IV (PEDS and ADULTS)  2 g Intravenous Q8H    cyclobenzaprine  5 mg Oral BID    DULoxetine  30 mg Oral Daily    eplerenone  25 mg Oral Daily    fluconazole (DIFLUCAN) IV (PEDS and ADULTS)  400 mg Intravenous Once    gabapentin  100 mg Oral BID    gabapentin  400 mg Oral QHS    insulin aspart U-100  10 Units Subcutaneous TIDWM    insulin glargine U-100  12 Units Subcutaneous BID    levETIRAcetam  1,500 mg Oral BID    LIDOcaine  2 patch Transdermal Q24H    magnesium oxide  400 mg Oral Daily    nicotine  1 patch Transdermal Daily    pantoprazole  40 mg Oral Daily    polyethylene glycol  17 g Oral Daily    warfarin  1 mg Oral Every Mon, Wed, Fri    warfarin  2 mg Oral Every Tues, Thurs, Sat, Sun     PRN Meds:  Current Facility-Administered Medications:     acetaminophen, 650 mg, Oral, Q6H PRN    dextrose 10%, 12.5 g, Intravenous, PRN    dextrose 10%, 25 g, Intravenous, PRN    glucagon (human recombinant), 1 mg, Intramuscular, PRN    glucose, 16 g, Oral, PRN    glucose, 24 g, Oral, PRN    insulin aspart U-100, 0-10 Units, Subcutaneous, QID (AC + HS) PRN    ondansetron, 4 mg, Oral, Q8H PRN    oxyCODONE, 10 mg, Oral, Daily PRN    Review of patient's allergies indicates:   Allergen Reactions    Torsemide Hives     Objective:     Vital Signs (Most Recent):  Temp: 98.2 °F (36.8 °C) (09/04/24 1150)  Pulse: 82 (09/04/24 1150)  Resp: 18 (09/04/24 1150)  BP: (!) 86/0 (09/04/24 1147)  SpO2: 99 % (09/04/24 1150) Vital Signs (24h  Range):  Temp:  [97.8 °F (36.6 °C)-98.6 °F (37 °C)] 98.2 °F (36.8 °C)  Pulse:  [76-92] 82  Resp:  [16-22] 18  SpO2:  [95 %-100 %] 99 %  BP: ()/(0-71) 86/0     Patient Vitals for the past 72 hrs (Last 3 readings):   Weight   09/03/24 0515 74.3 kg (163 lb 12.8 oz)   09/02/24 1145 73.1 kg (161 lb 2.5 oz)       Body mass index is 20.47 kg/m².      Intake/Output Summary (Last 24 hours) at 9/4/2024 1354  Last data filed at 9/4/2024 1231  Gross per 24 hour   Intake 1442 ml   Output 3275 ml   Net -1833 ml       Hemodynamic Parameters:       EKG- without ischemic changes this AM        Physical Exam  Constitutional:       General: He is not in acute distress.     Appearance: He is normal weight.   HENT:      Head: Normocephalic and atraumatic.      Right Ear: External ear normal.      Left Ear: External ear normal.      Nose: Nose normal.      Mouth/Throat:      Pharynx: Oropharynx is clear.   Cardiovascular:      Comments: Normal VAD hum, JVD present but improving   Pulmonary:      Effort: Pulmonary effort is normal.   Abdominal:      General: Abdomen is flat. Bowel sounds are normal. There is no distension (Distended but soft.).      Palpations: Abdomen is soft. There is no mass.      Tenderness: There is no abdominal tenderness. There is right CVA tenderness.   Musculoskeletal:      Cervical back: Normal range of motion.      Right lower leg: No edema.      Left lower leg: No edema.      Comments: Cont b/l back pain- upper spine and b/l paraspinal/upper back muscular pain.    Skin:     General: Skin is warm.      Comments: Chest wall on the right side with gynecomastia, no overlying skin changes, no tenderness at the site however palpable mass versus nodule present.   Neurological:      Mental Status: He is alert. Mental status is at baseline.            Significant Labs:  CBC:  Recent Labs   Lab 09/02/24  0601 09/03/24  0334 09/04/24  0217   WBC 12.97* 9.79 12.17   RBC 4.03* 4.04* 4.02*   HGB 7.8* 7.8* 7.5*   HCT  26.9* 26.9* 26.8*   * 602* 597*   MCV 67* 67* 67*   MCH 19.4* 19.3* 18.7*   MCHC 29.0* 29.0* 28.0*     BNP:  Recent Labs   Lab 08/30/24 0308 09/02/24 0205 09/04/24 0217   * 1,053* 1,170*     CMP:  Recent Labs   Lab 08/30/24 0308 08/31/24  0244 09/02/24 0205 09/03/24 0334 09/04/24 0217 09/04/24 0218   *   < > 41* 143*  --  70   CALCIUM 9.1   < > 9.1 9.9  --  9.6   ALBUMIN 2.5*  --  2.6*  --  2.6*  --    PROT 8.5*  --  8.5*  --  8.5*  --    *   < > 130* 128*  --  129*   K 4.1   < > 3.4* 4.9  --  3.9   CO2 25   < > 26 27  --  25   CL 95   < > 93* 91*  --  92*   BUN 21*   < > 20 22*  --  18   CREATININE 1.3   < > 1.2 1.2  --  1.2   ALKPHOS 221*  --  230*  --  237*  --    ALT 11  --  8*  --  8*  --    AST 31  --  31  --  30  --    BILITOT 2.0*  --  1.9*  --  1.7*  --     < > = values in this interval not displayed.      Coagulation:   Recent Labs   Lab 09/02/24 0205 09/03/24 0334 09/04/24 0218   INR 2.8* 3.3* 3.2*   APTT 40.9* 41.5* 41.3*     LDH:  Recent Labs   Lab 09/02/24 0205 09/03/24 0334 09/04/24 0218   * 300* 319*     Microbiology:  Microbiology Results (last 7 days)       Procedure Component Value Units Date/Time    Blood culture [[id number]]  (Abnormal) Collected: 08/27/24 2045    Order Status: Completed Specimen: Blood Updated: 09/04/24 1339     Blood Culture, Routine Gram stain dudley bottle: Gram positive cocci in clusters resembling Staph      Results called to and read back by: Lorri Riggs RN  08/29/2024   13:50      Gram stain aer bottle: yeast      Results called to and read back by:Lorri Riggs RN 08/30/2024      STAPHYLOCOCCUS AUREUS  ID consult required at [facility].  For susceptibility see order #[id number]        YEAST   Identification pending  For susceptibility see order #[id number]  ID consult required at [facility] and [facility].      Narrative:      Blood culture # 2 different location.    Blood culture  [[id number]]  (Abnormal) Collected: 09/01/24 0939    Order Status: Completed Specimen: Blood Updated: 09/04/24 1317     Blood Culture, Routine Gram stain dudley bottle: yeast      Results called to and read back by: Rufino 09/03/2024  06:15      CANDIDA PARAPSILOSIS  For susceptibility see order #[id number]  ID consult required at [facility].      Blood culture [[id number]] Collected: 09/04/24 0217    Order Status: Completed Specimen: Blood Updated: 09/04/24 1315     Blood Culture, Routine No Growth to date    Blood culture [[id number]]  (Abnormal) Collected: 09/01/24 0939    Order Status: Completed Specimen: Blood Updated: 09/04/24 1015     Blood Culture, Routine Gram stain aer bottle: yeast      Gram stain dudley bottle: yeast      Results called to and read back by: Rufino 09/03/2024  01:45      YEAST   Identification pending  ID consult required at [facility].  For susceptibility see order #[id number]      Blood culture [[id number]] Collected: 08/30/24 1152    Order Status: Completed Specimen: Blood Updated: 09/03/24 1412     Blood Culture, Routine No Growth to date      No Growth to date      No Growth to date      No Growth to date      No Growth to date    Narrative:      Rt AC    Blood culture [[id number]]  (Abnormal) Collected: 08/28/24 2226    Order Status: Completed Specimen: Blood Updated: 09/03/24 1014     Blood Culture, Routine Gram stain aer bottle: yeast      Gram stain dudley bottle: yeast      Positive results previously called 08/30/2024      YEAST   Identification pending  For susceptibility see order #[id number]  ID consult required at [facility].      Narrative:      Code 14627  Draw sample # 2 from separate site    Blood culture [[id number]]  (Abnormal) Collected: 08/28/24 2226    Order Status: Completed Specimen: Blood Updated: 09/03/24 1013     Blood Culture, Routine Gram stain aer bottle: yeast       Results called to and read back by:Lorri Riggs RN 08/30/2024  17:10      YEAST   Identification pending  For susceptibility see order #[id number]  ID consult required at [facility].      Narrative:      Code 84712  Draw sample # 2 from separate site    Blood culture [[id number]]  (Abnormal) Collected: 08/30/24 1152    Order Status: Completed Specimen: Blood Updated: 09/03/24 0856     Blood Culture, Routine Gram stain dudley bottle: yeast      Results called to and read back by: Rufino 09/01/2024  02:03      CANDIDA PARAPSILOSIS  ID consult required at [facility].  Susceptibility pending      Narrative:      Lft AC    Culture, Anaerobe [[id number]] Collected: 08/27/24 1446    Order Status: Completed Specimen: Skin from Abdomen Updated: 09/02/24 0936     Anaerobic Culture No anaerobes isolated    Narrative:      Driveline site    Blood culture [[id number]]  (Abnormal)  (Susceptibility) Collected: 08/27/24 1802    Order Status: Completed Specimen: Blood Updated: 08/31/24 1049     Blood Culture, Routine Gram stain aer bottle: Gram positive cocci in clusters resembling Staph      Results called to and read back by:Miguelina [facility] RN 08/28/2024  22:59      MICROCOCCUS SPECIES  Organism is a probable contaminant        STAPHYLOCOCCUS AUREUS  ID consult required at [facility] and [facility].      Rapid Organism ID by PCR (from Blood culture) [[id number]] Collected: 08/28/24 2226    Order Status: Completed Updated: 08/30/24 1823     Enterococcus faecalis Not Detected     Enterococcus faecium Not Detected     Listeria monocytogenes Not Detected     Staphylococcus spp. Not Detected     Staphylococcus aureus Not Detected     Staphylococcus epidermidis Not Detected     Staphylococcus lugdunensis Not Detected     Streptococcus species Not Detected     Streptococcus agalactiae Not Detected     Streptococcus pneumoniae Not Detected     Streptococcus  pyogenes Not Detected     Acinetobacter calcoaceticus/baumannii complex Not Detected     Bacteroides fragilis Not Detected     Enterobacterales Not Detected     Enterobacter cloacae complex Not Detected     Escherichia coli Not Detected     Klebsiella aerogenes Not Detected     Klebsiella oxytoca Not Detected     Klebsiella pneumoniae group Not Detected     Proteus Not Detected     Salmonella sp Not Detected     Serratia marcescens Not Detected     Haemophilus influenzae Not Detected     Neisseria meningtidis Not Detected     Pseudomonas aeruginosa Not Detected     Stenotrophomonas maltophilia Not Detected     Candida albicans Not Detected     Candida auris Not Detected     Candida glabrata Not Detected     Candida krusei Not Detected     Candida parapsilosis Not Detected     Candida tropicalis Not Detected     Cryptococcus neoformans/gattii Not Detected     CTX-M (ESBL ) Test Not Applicable     IMP (Carbapenem resistant) Test Not Applicable     KPC resistance gene (Carbapenem resistant) Test Not Applicable     mcr-1  Test Not Applicable     mec A/C  Test Not Applicable     mec A/C and MREJ (MRSA) gene Test Not Applicable     NDM (Carbapenem resistant) Test Not Applicable     OXA-48-like (Carbapenem resistant) Test Not Applicable     van A/B (VRE gene) Test Not Applicable     VIM (Carbapenem resistant) Test Not Applicable    Narrative:      Code 19250  Draw sample # 2 from separate site    Culture, Anaerobe [[id number]] Collected: 08/25/24 1733    Order Status: Completed Specimen: Wound from Abdomen Updated: 08/30/24 1405     Anaerobic Culture No anaerobes isolated    Narrative:      Drive line exit site    Aerobic culture [[id number]]  (Abnormal)  (Susceptibility) Collected: 08/27/24 1446    Order Status: Completed Specimen: Skin from Abdomen Updated: 08/29/24 1424     Aerobic Bacterial Culture STAPHYLOCOCCUS AUREUS  Rare      Narrative:      Driveline site    MRSA/SA Rapid ID by PCR from Blood culture  [[id number]]  (Abnormal) Collected: 08/27/24 1802    Order Status: Completed Updated: 08/29/24 0027     Staph aureus ID by PCR Positive     Methicillin Resistant ID by PCR Negative            I have reviewed all pertinent labs within the past 24 hours.    Estimated Creatinine Clearance: 86.9 mL/min (based on SCr of 1.2 mg/dL).    Diagnostic Results:  I have reviewed and interpreted all pertinent imaging results/findings within the past 24 hours.

## 2024-09-04 NOTE — NURSING
Plan of care discussed with patient.  Patient ambulating independently, fall precautions in place. LVAD DP and numbers WNL, smooth LVAD hum. Patient has no complaints of pain. Discussed medications and care. Patient has no questions at this time.

## 2024-09-04 NOTE — PROGRESS NOTES
[facility] - Cardiology Stepdown  Endocrinology  Progress Note    Admit Date: 8/25/2024     Reason for Consult: Management of T2DM, Hyperglycemia     Surgical Procedure and Date: LVAD 06/29/2022     Diabetes diagnosis year: 2022    Home Diabetes Medications:  Levemir 20 units twice daily and Novolog SSI (150/25) per patient    How often checking glucose at home?  Has not checked in a few weeks due to running out of strips    BG readings on regimen: COLLIN  Hypoglycemia on the regimen?  Yes; rarely experiences hypoglycemic symptoms such as blurry vision and vomiting. However, does not know his blood sugar level due to running out of supplies. He will self-correct with a snack.  Missed doses on regimen?  Yes, has not had insulin in a few weeks due to running out of glucometer supplies.    Diabetes Complications include:   Hyperglycemia    Complicating diabetes co morbidities:   History of MI, CHF, and CKD      HPI:   Patient is a 39 y.o. 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 He underwent RVAD removal and chest closure 6/30/2022.  He was weaned off  but restarted due to RVF. He was transitioned to milrinone (secondary to  shortage). History of unclear syncope vs seizures and followed by neuro and is on Keppra. On 11/15/23 underwent removal of eroded [employer] scICD--no Lifevest (due to LVAD) and no plan to replace ICD as not requiring therapy post LVAD with concern for reinfection. Reported back pain (primarily upper back pain) for the past 3-4 days. He reported that it started 2 days after stopping pirmacor and he felt it may be related. Reported some shortness of breath after walking long distances which has been ongoing for months now and has not changed lately. He has lost some weight over last few months since his  "but weight has been stable over the last month. [facility] with ongoing [facility] pain. Patient stated he has not taken his insulin in a few weeks due to running out of glucometer testing supplies. He previously had a William, but is not wearing one. BG >700 at [facility]. Endo consulted for BG management.      Interval HPI:   Overnight events: Remains in CSU. BG at or slightly below goal ranges on current SQ insulin regimen. Diet diabetic Cardiac (Low Na/Chol); 2000 Calorie; Fluid - 1500mL; Standard Tray    Eatin%  Nausea: No  Hypoglycemia and intervention: No  Fever: No  TPN and/or TF: No  If yes, type of TF/TPN and rate: n/a    BP (!) 86/0 (BP Location: Left arm)   Pulse 82   Temp 98.2 °F (36.8 °C) (Oral)   Resp 18   Ht 6' 3" (1.905 m)   Wt 74.3 kg (163 lb 12.8 oz)   SpO2 99%   BMI 20.47 kg/m²     Labs Reviewed and Include    Recent Labs   Lab 24   GLU  --  70   CALCIUM  --  9.6   ALBUMIN 2.6*  --    PROT 8.5*  --    NA  --  129*   K  --  3.9   CO2  --  25   CL  --  92*   BUN  --  18   CREATININE  --  1.2   ALKPHOS 237*  --    ALT 8*  --    AST 30  --    BILITOT 1.7*  --      Lab Results   Component Value Date    WBC 12.17 2024    HGB 7.5 (L) 2024    HCT 26.8 (L) 2024    MCV 67 (L) 2024     (H) 2024     No results for input(s): "TSH", "FREET4" in the last 168 hours.  Lab Results   Component Value Date    HGBA1C 10.3 (H) 2024       Nutritional status:   Body mass index is 20.47 kg/m².  Lab Results   Component Value Date    ALBUMIN 2.6 (L) 2024    ALBUMIN 2.6 (L) 2024    ALBUMIN 2.5 (L) 2024     Lab Results   Component Value Date    PREALBUMIN 10 (L) 2024    PREALBUMIN 10 (L) 2024    PREALBUMIN 9 (L) 2024       Estimated Creatinine Clearance: 86.9 mL/min (based on SCr of 1.2 mg/dL).    Accu-Checks  Recent Labs     24  1043 24  1140 " 09/02/24  1610 09/02/24  2024 09/02/24  2327 09/03/24  0627 09/03/24  1124 09/03/24  1632 09/03/24  1942 09/04/24  0705   POCTGLUCOSE 294* 282* 247* 123* 109 185* 156* 223* 115* 89       Current Medications and/or Treatments Impacting Glycemic Control  Immunotherapy:    Immunosuppressants       None          Steroids:   Hormones (From admission, onward)      None          Pressors:    Autonomic Drugs (From admission, onward)      None          Hyperglycemia/Diabetes Medications:   Antihyperglycemics (From admission, onward)      Start     Stop Route Frequency Ordered    09/02/24 1130  insulin aspart U-100 pen 10 Units         -- SubQ 3 times daily with meals 09/02/24 0841    09/02/24 0900  insulin glargine U-100 (Lantus) pen 14 Units         -- SubQ 2 times daily 09/02/24 0838    08/25/24 1506  insulin aspart U-100 pen 0-10 Units         -- SubQ Before meals & nightly PRN 08/25/24 1407            ASSESSMENT and PLAN    Cardiac/Vascular  * LVAD (left ventricular assist device) present  Managed by primary team  Optimize blood glucose control        CHF (NYHA class IV, ACC/AHA stage D)  Managed by primary team  Optimize blood glucose control      Endocrine  Type 2 diabetes mellitus with hyperglycemia, with long-term current use of insulin  BG goal 140-180  Noncompliant T2DM.       Plan:   -Decrease Lantus (Insulin Glargine) to 12 units BID (20% dose decrease; fasting BG below goal ranges)   - Novolog (Insulin Aspart) 10 units TIDWM.HOLD if BG < 100  - Continue moderate dose correction (150/25)  - BG checks AC/HS  - Hypoglycemia protocol in place    ** Please notify Endocrine for any change and/or advance in diet**  ** Please call Endocrine for any BG related issues **    Discharge Planning:   TBD. Please notify endocrinology prior to discharge.  Patient requires glucometer and testing supplies.  Recommend he resumes his William for blood sugar monitoring.          Jody Starkey, NP  Endocrinology  [facility] - Cardiology  Stepdown

## 2024-09-04 NOTE — ASSESSMENT & PLAN NOTE
-Goal INR 2-3 , INR 4.7 on admission - improving  - pharmacy  to manage coumadin dosing, cont warfarin   Check INR daily     Lab Results   Component Value Date    INR 3.2 (H) 09/04/2024    INR 3.3 (H) 09/03/2024    INR 2.8 (H) 09/02/2024

## 2024-09-04 NOTE — SUBJECTIVE & OBJECTIVE
Interval History: No acute events overnight. No new complaints.    Review of Systems   Constitutional:  Negative for chills and fever.   Eyes:  Negative for visual disturbance.   Genitourinary:  Negative for difficulty urinating and dysuria.   Musculoskeletal:  Positive for back pain.   All other systems reviewed and are negative.    Objective:     Vital Signs (Most Recent):  Temp: 98.2 °F (36.8 °C) (09/04/24 1150)  Pulse: 82 (09/04/24 1150)  Resp: 18 (09/04/24 1150)  BP: (!) 86/0 (09/04/24 1147)  SpO2: 99 % (09/04/24 1150) Vital Signs (24h Range):  Temp:  [97.8 °F (36.6 °C)-98.6 °F (37 °C)] 98.2 °F (36.8 °C)  Pulse:  [76-90] 82  Resp:  [16-22] 18  SpO2:  [95 %-100 %] 99 %  BP: ()/(0-71) 86/0     Weight:  (pt stated his back hurts to bad to stand.)  Body mass index is 20.47 kg/m².    Estimated Creatinine Clearance: 86.9 mL/min (based on SCr of 1.2 mg/dL).     Physical Exam  Constitutional:       General: He is not in acute distress.     Appearance: He is not ill-appearing or toxic-appearing.   Eyes:      General:         Right eye: No discharge.         Left eye: No discharge.   Cardiovascular:      Comments: LVAD hum present  Pulmonary:      Effort: Pulmonary effort is normal. No respiratory distress.   Abdominal:      General: There is no distension.      Palpations: Abdomen is soft.      Tenderness: There is no right CVA tenderness or left CVA tenderness.      Comments: LVAD dressed   Musculoskeletal:         General: Tenderness (lower back pain) present.   Skin:     General: Skin is warm and dry.   Neurological:      Mental Status: He is alert and oriented to person, place, and time.          Significant Labs: Blood Culture:   Recent Labs   Lab 08/27/24  1802 08/28/24  2226 08/30/24  1152 09/01/24  0939 09/04/24  0217   LABBLOO Gram stain aer bottle: Gram positive cocci in clusters resembling Staph  Results called to and read back by:Miguelina Thomas RN 08/28/2024  22:59  MICROCOCCUS SPECIES  Organism is a  probable contaminant  *  STAPHYLOCOCCUS AUREUS  ID consult required at [facility].  * Gram stain aer bottle: yeast  Gram stain dudley bottle: yeast  Positive results previously called 08/30/2024  YEAST   Identification pending  For susceptibility see order #[id number]  ID consult required at [facility].  *  Gram stain aer bottle: yeast  Results called to and read back by:Lorri Riggs RN 08/30/2024  17:10  YEAST   Identification pending  For susceptibility see order #[id number]  ID consult required at [facility].  * No growth after 5 days.  Gram stain dudley bottle: yeast  Results called to and read back by: Rufino 09/01/2024  02:03  CANDIDA PARAPSILOSIS  ID consult required at [facility].  Susceptibility pending  * Gram stain dudley bottle: yeast  Results called to and read back by: Rufino 09/03/2024  06:15  CANDIDA PARAPSILOSIS  For susceptibility see order #[id number]  ID consult required at [facility].  *  Gram stain aer bottle: yeast  Gram stain dudley bottle: yeast  Results called to and read back by: Rufino 09/03/2024  01:45  YEAST   Identification pending  ID consult required at [facility].  For susceptibility see order #[id number]  * No Growth to date     Wound Culture:   Recent Labs   Lab 05/20/24  1653 08/27/24  1446   LABAERO No growth STAPHYLOCOCCUS AUREUS  Rare  *     All pertinent labs within the past 24 hours have been reviewed.    Significant Imaging: I have reviewed all pertinent imaging results/findings within the past 24 hours.

## 2024-09-04 NOTE — ASSESSMENT & PLAN NOTE
MSSA DLESI  Bacteremia  Pyelnephritis/renal abscess  Fungemia    38 yo male with LVAD HM3 as DT, prior MSSA DLESI/bacteremia c/b empyema (on suppressive cefadroxil) and eroded ICD/pocket infection s/p removal, seizure admitted for back pain. Hospital course notable for blcx with micrococcus (suspect contaminant), MSSA bacteremia (suspect due to DLES), and c parapsilosis fungemia (suspect 2/2 to prior picc vs pyelo). DLES also with MSSA.  CT with contrast now with irregular nonenhancing renal lesion c/f pyelonephritis or potential abscess, R chest lesion c/f gynecomastia. Old PICC pulled 8/29. TTE without IE. S/p ophthalmology evaluation, no evidence of endophthalmitis. CT spine without evidence of OM/discitis.     BCx 9/1 still growing yeast. Concern persistent fungemia is due to antifungal resistance vs inadequate source control.    Recommendations:  -continue cefazolin 2g q8hr IV to target MSSA  -continue fluconazole  -monitor for QTc prolongation  -echo to eval for endocarditis given persistent fungemia. Will help determine duration of antifungal therapy.  -f/u BCx to assess for clearance of fungemia

## 2024-09-04 NOTE — ASSESSMENT & PLAN NOTE
BG goal 140-180  Noncompliant T2DM.       Plan:   -Decrease Lantus (Insulin Glargine) to 12 units BID (20% dose decrease; fasting BG below goal ranges)   - Novolog (Insulin Aspart) 10 units TIDWM.HOLD if BG < 100  - Continue moderate dose correction (150/25)  - BG checks AC/HS  - Hypoglycemia protocol in place    ** Please notify Endocrine for any change and/or advance in diet**  ** Please call Endocrine for any BG related issues **    Discharge Planning:   TBD. Please notify endocrinology prior to discharge.  Patient requires glucometer and testing supplies.  Recommend he resumes his William for blood sugar monitoring.

## 2024-09-04 NOTE — PROGRESS NOTES
09/04/2024  Enrique NANDO Awan Jr    Current provider:  Natalya Villatoro MD    Device interrogation:      9/4/2024     8:26 AM 9/4/2024     4:45 AM 9/3/2024    11:45 PM 9/3/2024     7:45 PM 9/3/2024     4:00 PM 9/3/2024    11:52 AM 9/3/2024     8:00 AM   TXP LVAD INTERROGATIONS   Type HeartMate3 HeartMate3 HeartMate3 HeartMate3 HeartMate3 HeartMate3 HeartMate3   Flow 4.3 3.8 4.1 4 4.2 4.1 3.5   Speed 5100 5100 5100 5100 5100 5100 5100   PI 4.7 5.9 5.5 5.7 5.7 5.9 7.3   Power (Serna) 3.5 3.5 3.5 3.6 3.5 3.5 3.6   LSL  4700 4700 4700 4700 4700 4700   Pulsatility  Intermittent pulse Intermittent pulse Intermittent pulse Pulse Pulse Intermittent pulse          Rounded on Kevan Queen to ensure all mechanical assist device settings (IABP or VAD) were appropriate and all parameters were within limits.  I was able to ensure all back up equipment was present, the staff had no issues, and the Perfusion Department daily rounding was complete.      For implantable VADs: Interrogation of Ventricular assist device was performed with analysis of device parameters and review of device function. I have personally reviewed the interrogation findings and agree with findings as stated.     In emergency, the nursing units have been notified to contact the perfusion department either by:  Calling [phone] from 630am to 4pm Mon thru Fri, utilizing the On-Call Finder functionality of Epic and searching for Perfusion, or by contacting the hospital  from 4pm to 630am and on weekends and asking to speak with the perfusionist on call.    11:12 AM

## 2024-09-04 NOTE — NURSING
Daily kit LVAD DLES dressing changed under sterile technique. DLES stage 2. Scant greenish milky drainage on removed dressing. Site is intact and without redness. Patient denies pain at site. Patient tolerated well. Next dressing change due 9/5/2024.

## 2024-09-05 LAB
ANION GAP SERPL CALC-SCNC: 13 MMOL/L (ref 8–16)
APTT PPP: 43 SEC (ref 21–32)
BASOPHILS # BLD AUTO: 0.06 K/UL (ref 0–0.2)
BASOPHILS NFR BLD: 0.6 % (ref 0–1.9)
BUN SERPL-MCNC: 14 MG/DL (ref 6–20)
CALCIUM SERPL-MCNC: 9 MG/DL (ref 8.7–10.5)
CHLORIDE SERPL-SCNC: 91 MMOL/L (ref 95–110)
CO2 SERPL-SCNC: 27 MMOL/L (ref 23–29)
CREAT SERPL-MCNC: 1.1 MG/DL (ref 0.5–1.4)
DIFFERENTIAL METHOD BLD: ABNORMAL
EOSINOPHIL # BLD AUTO: 0 K/UL (ref 0–0.5)
EOSINOPHIL NFR BLD: 0.4 % (ref 0–8)
ERYTHROCYTE [DISTWIDTH] IN BLOOD BY AUTOMATED COUNT: 26.9 % (ref 11.5–14.5)
EST. GFR  (NO RACE VARIABLE): >60 ML/MIN/1.73 M^2
GLUCOSE SERPL-MCNC: 67 MG/DL (ref 70–110)
HCT VFR BLD AUTO: 27.8 % (ref 40–54)
HGB BLD-MCNC: 7.8 G/DL (ref 14–18)
IMM GRANULOCYTES # BLD AUTO: 0.03 K/UL (ref 0–0.04)
IMM GRANULOCYTES NFR BLD AUTO: 0.3 % (ref 0–0.5)
INR PPP: 3.4 (ref 0.8–1.2)
LDH SERPL L TO P-CCNC: 295 U/L (ref 110–260)
LYMPHOCYTES # BLD AUTO: 1.5 K/UL (ref 1–4.8)
LYMPHOCYTES NFR BLD: 15.5 % (ref 18–48)
MAGNESIUM SERPL-MCNC: 1.9 MG/DL (ref 1.6–2.6)
MCH RBC QN AUTO: 19.1 PG (ref 27–31)
MCHC RBC AUTO-ENTMCNC: 28.1 G/DL (ref 32–36)
MCV RBC AUTO: 68 FL (ref 82–98)
MONOCYTES # BLD AUTO: 1 K/UL (ref 0.3–1)
MONOCYTES NFR BLD: 10.3 % (ref 4–15)
NEUTROPHILS # BLD AUTO: 6.8 K/UL (ref 1.8–7.7)
NEUTROPHILS NFR BLD: 72.9 % (ref 38–73)
NRBC BLD-RTO: 0 /100 WBC
PHOSPHATE SERPL-MCNC: 3.6 MG/DL (ref 2.7–4.5)
PLATELET # BLD AUTO: 589 K/UL (ref 150–450)
PMV BLD AUTO: 9 FL (ref 9.2–12.9)
POCT GLUCOSE: 114 MG/DL (ref 70–110)
POCT GLUCOSE: 116 MG/DL (ref 70–110)
POCT GLUCOSE: 176 MG/DL (ref 70–110)
POCT GLUCOSE: 178 MG/DL (ref 70–110)
POTASSIUM SERPL-SCNC: 3.4 MMOL/L (ref 3.5–5.1)
PROTHROMBIN TIME: 34.2 SEC (ref 9–12.5)
RBC # BLD AUTO: 4.08 M/UL (ref 4.6–6.2)
SODIUM SERPL-SCNC: 131 MMOL/L (ref 136–145)
WBC # BLD AUTO: 9.34 K/UL (ref 3.9–12.7)

## 2024-09-05 PROCEDURE — 84100 ASSAY OF PHOSPHORUS: CPT | Performed by: STUDENT IN AN ORGANIZED HEALTH CARE EDUCATION/TRAINING PROGRAM

## 2024-09-05 PROCEDURE — 83735 ASSAY OF MAGNESIUM: CPT | Performed by: STUDENT IN AN ORGANIZED HEALTH CARE EDUCATION/TRAINING PROGRAM

## 2024-09-05 PROCEDURE — 63600175 PHARM REV CODE 636 W HCPCS: Performed by: STUDENT IN AN ORGANIZED HEALTH CARE EDUCATION/TRAINING PROGRAM

## 2024-09-05 PROCEDURE — 27000248 HC VAD-ADDITIONAL DAY

## 2024-09-05 PROCEDURE — 85730 THROMBOPLASTIN TIME PARTIAL: CPT | Performed by: STUDENT IN AN ORGANIZED HEALTH CARE EDUCATION/TRAINING PROGRAM

## 2024-09-05 PROCEDURE — 93750 INTERROGATION VAD IN PERSON: CPT | Mod: ,,, | Performed by: INTERNAL MEDICINE

## 2024-09-05 PROCEDURE — 94761 N-INVAS EAR/PLS OXIMETRY MLT: CPT

## 2024-09-05 PROCEDURE — 25000003 PHARM REV CODE 250: Performed by: INTERNAL MEDICINE

## 2024-09-05 PROCEDURE — 99900035 HC TECH TIME PER 15 MIN (STAT)

## 2024-09-05 PROCEDURE — 99232 SBSQ HOSP IP/OBS MODERATE 35: CPT | Mod: ,,,

## 2024-09-05 PROCEDURE — 25000003 PHARM REV CODE 250: Performed by: STUDENT IN AN ORGANIZED HEALTH CARE EDUCATION/TRAINING PROGRAM

## 2024-09-05 PROCEDURE — 36415 COLL VENOUS BLD VENIPUNCTURE: CPT | Performed by: STUDENT IN AN ORGANIZED HEALTH CARE EDUCATION/TRAINING PROGRAM

## 2024-09-05 PROCEDURE — 20600001 HC STEP DOWN PRIVATE ROOM

## 2024-09-05 PROCEDURE — 80048 BASIC METABOLIC PNL TOTAL CA: CPT | Performed by: STUDENT IN AN ORGANIZED HEALTH CARE EDUCATION/TRAINING PROGRAM

## 2024-09-05 PROCEDURE — 25000003 PHARM REV CODE 250: Performed by: PHYSICIAN ASSISTANT

## 2024-09-05 PROCEDURE — 85025 COMPLETE CBC W/AUTO DIFF WBC: CPT | Performed by: STUDENT IN AN ORGANIZED HEALTH CARE EDUCATION/TRAINING PROGRAM

## 2024-09-05 PROCEDURE — 85610 PROTHROMBIN TIME: CPT | Performed by: STUDENT IN AN ORGANIZED HEALTH CARE EDUCATION/TRAINING PROGRAM

## 2024-09-05 PROCEDURE — 25000003 PHARM REV CODE 250

## 2024-09-05 PROCEDURE — 83615 LACTATE (LD) (LDH) ENZYME: CPT | Performed by: STUDENT IN AN ORGANIZED HEALTH CARE EDUCATION/TRAINING PROGRAM

## 2024-09-05 PROCEDURE — 63600175 PHARM REV CODE 636 W HCPCS: Performed by: PHYSICIAN ASSISTANT

## 2024-09-05 RX ORDER — FUROSEMIDE 10 MG/ML
80 INJECTION INTRAMUSCULAR; INTRAVENOUS ONCE
Status: COMPLETED | OUTPATIENT
Start: 2024-09-05 | End: 2024-09-05

## 2024-09-05 RX ORDER — POTASSIUM CHLORIDE 20 MEQ/1
40 TABLET, EXTENDED RELEASE ORAL 3 TIMES DAILY
Status: DISCONTINUED | OUTPATIENT
Start: 2024-09-05 | End: 2024-09-09

## 2024-09-05 RX ORDER — AMLODIPINE BESYLATE 5 MG/1
5 TABLET ORAL ONCE
Status: COMPLETED | OUTPATIENT
Start: 2024-09-05 | End: 2024-09-05

## 2024-09-05 RX ORDER — INSULIN GLARGINE 100 [IU]/ML
10 INJECTION, SOLUTION SUBCUTANEOUS 2 TIMES DAILY
Status: DISCONTINUED | OUTPATIENT
Start: 2024-09-05 | End: 2024-09-07

## 2024-09-05 RX ADMIN — BUMETANIDE 2 MG: 1 TABLET ORAL at 08:09

## 2024-09-05 RX ADMIN — INSULIN ASPART 10 UNITS: 100 INJECTION, SOLUTION INTRAVENOUS; SUBCUTANEOUS at 11:09

## 2024-09-05 RX ADMIN — INSULIN ASPART 10 UNITS: 100 INJECTION, SOLUTION INTRAVENOUS; SUBCUTANEOUS at 04:09

## 2024-09-05 RX ADMIN — AMIODARONE HYDROCHLORIDE 400 MG: 200 TABLET ORAL at 08:09

## 2024-09-05 RX ADMIN — LEVETIRACETAM 1500 MG: 500 TABLET, FILM COATED ORAL at 08:09

## 2024-09-05 RX ADMIN — INSULIN ASPART 10 UNITS: 100 INJECTION, SOLUTION INTRAVENOUS; SUBCUTANEOUS at 07:09

## 2024-09-05 RX ADMIN — PANTOPRAZOLE SODIUM 40 MG: 40 TABLET, DELAYED RELEASE ORAL at 08:09

## 2024-09-05 RX ADMIN — CEFAZOLIN 2 G: 2 INJECTION, POWDER, FOR SOLUTION INTRAMUSCULAR; INTRAVENOUS at 02:09

## 2024-09-05 RX ADMIN — CYCLOBENZAPRINE HYDROCHLORIDE 5 MG: 5 TABLET, FILM COATED ORAL at 08:09

## 2024-09-05 RX ADMIN — FUROSEMIDE 10 MG/HR: 10 INJECTION, SOLUTION INTRAMUSCULAR; INTRAVENOUS at 02:09

## 2024-09-05 RX ADMIN — INSULIN ASPART 2 UNITS: 100 INJECTION, SOLUTION INTRAVENOUS; SUBCUTANEOUS at 04:09

## 2024-09-05 RX ADMIN — CEFAZOLIN 2 G: 2 INJECTION, POWDER, FOR SOLUTION INTRAMUSCULAR; INTRAVENOUS at 08:09

## 2024-09-05 RX ADMIN — DULOXETINE HYDROCHLORIDE 30 MG: 30 CAPSULE, DELAYED RELEASE ORAL at 08:09

## 2024-09-05 RX ADMIN — FUROSEMIDE 80 MG: 10 INJECTION, SOLUTION INTRAVENOUS at 02:09

## 2024-09-05 RX ADMIN — INSULIN ASPART 1 UNITS: 100 INJECTION, SOLUTION INTRAVENOUS; SUBCUTANEOUS at 08:09

## 2024-09-05 RX ADMIN — GABAPENTIN 100 MG: 100 CAPSULE ORAL at 08:09

## 2024-09-05 RX ADMIN — EPLERENONE 25 MG: 25 TABLET, FILM COATED ORAL at 08:09

## 2024-09-05 RX ADMIN — POTASSIUM CHLORIDE 40 MEQ: 1500 TABLET, EXTENDED RELEASE ORAL at 08:09

## 2024-09-05 RX ADMIN — OXYCODONE HYDROCHLORIDE 10 MG: 10 TABLET, FILM COATED, EXTENDED RELEASE ORAL at 04:09

## 2024-09-05 RX ADMIN — AMLODIPINE BESYLATE 5 MG: 5 TABLET ORAL at 09:09

## 2024-09-05 RX ADMIN — ASPIRIN 81 MG: 81 TABLET, COATED ORAL at 08:09

## 2024-09-05 RX ADMIN — INSULIN GLARGINE 12 UNITS: 100 INJECTION, SOLUTION SUBCUTANEOUS at 08:09

## 2024-09-05 RX ADMIN — GABAPENTIN 400 MG: 400 CAPSULE ORAL at 08:09

## 2024-09-05 RX ADMIN — GABAPENTIN 100 MG: 100 CAPSULE ORAL at 12:09

## 2024-09-05 RX ADMIN — INSULIN GLARGINE 10 UNITS: 100 INJECTION, SOLUTION SUBCUTANEOUS at 08:09

## 2024-09-05 RX ADMIN — CEFAZOLIN 2 G: 2 INJECTION, POWDER, FOR SOLUTION INTRAMUSCULAR; INTRAVENOUS at 06:09

## 2024-09-05 RX ADMIN — POTASSIUM CHLORIDE 40 MEQ: 1500 TABLET, EXTENDED RELEASE ORAL at 03:09

## 2024-09-05 RX ADMIN — AMLODIPINE BESYLATE 5 MG: 5 TABLET ORAL at 08:09

## 2024-09-05 RX ADMIN — Medication 400 MG: at 08:09

## 2024-09-05 RX ADMIN — ATORVASTATIN CALCIUM 40 MG: 20 TABLET, FILM COATED ORAL at 08:09

## 2024-09-05 NOTE — PROGRESS NOTES
[facility] - Cardiology Stepdown  Heart Transplant  Progress Note    Patient Name: Kevan Queen  MRN: [id number]  Admission Date: 8/25/2024  Hospital Length of Stay: 11 days  Attending Physician: Natalya Villatoro MD  Primary Care Provider: Rosio Armendariz FNP  Principal Problem:LVAD (left ventricular assist device) present    Subjective:   Interval History:   No complaints this AM or events overnight. Repeat Bcx's yesterday NGTD but previous ones showing persistent fungemia so micafungin switched to fluconazole. JVP appears elevated so will diurese with IV lasix     Continuous Infusions: none       Scheduled Meds:   [START ON 9/9/2024] amiodarone  200 mg Oral Daily    amiodarone  400 mg Oral BID    amLODIPine  5 mg Oral Daily    aspirin  81 mg Oral Daily    atorvastatin  40 mg Oral Daily    bumetanide  2 mg Oral BID loop    ceFAZolin (Ancef) IV (PEDS and ADULTS)  2 g Intravenous Q8H    cyclobenzaprine  5 mg Oral BID    DULoxetine  30 mg Oral Daily    eplerenone  25 mg Oral Daily    gabapentin  100 mg Oral BID    gabapentin  400 mg Oral QHS    insulin aspart U-100  10 Units Subcutaneous TIDWM    insulin glargine U-100  10 Units Subcutaneous BID    levETIRAcetam  1,500 mg Oral BID    LIDOcaine  2 patch Transdermal Q24H    magnesium oxide  400 mg Oral Daily    nicotine  1 patch Transdermal Daily    pantoprazole  40 mg Oral Daily    polyethylene glycol  17 g Oral Daily    warfarin  1 mg Oral Daily     PRN Meds:  Current Facility-Administered Medications:     acetaminophen, 650 mg, Oral, Q6H PRN    dextrose 10%, 12.5 g, Intravenous, PRN    dextrose 10%, 25 g, Intravenous, PRN    glucagon (human recombinant), 1 mg, Intramuscular, PRN    glucose, 16 g, Oral, PRN    glucose, 24 g, Oral, PRN    insulin aspart U-100, 0-10 Units, Subcutaneous, QID (AC + HS) PRN    ondansetron, 4 mg, Oral, Q8H PRN    oxyCODONE, 10 mg, Oral, Daily PRN    Review of patient's allergies indicates:   Allergen Reactions    Torsemide Hives      Objective:     Vital Signs (Most Recent):  Temp: 98.1 °F (36.7 °C) (09/05/24 0746)  Pulse: 85 (09/05/24 0815)  Resp: (!) 24 (09/05/24 0815)  BP: (!) 90/0 (09/05/24 0815)  SpO2: 98 % (09/05/24 0746) Vital Signs (24h Range):  Temp:  [98.1 °F (36.7 °C)-98.7 °F (37.1 °C)] 98.1 °F (36.7 °C)  Pulse:  [78-88] 85  Resp:  [18-24] 24  SpO2:  [96 %-99 %] 98 %  BP: ()/(0-60) 90/0     Patient Vitals for the past 72 hrs (Last 3 readings):   Weight   09/03/24 0515 74.3 kg (163 lb 12.8 oz)   09/02/24 1145 73.1 kg (161 lb 2.5 oz)       Body mass index is 20.47 kg/m².      Intake/Output Summary (Last 24 hours) at 9/5/2024 1029  Last data filed at 9/5/2024 0119  Gross per 24 hour   Intake 702 ml   Output 2100 ml   Net -1398 ml       Hemodynamic Parameters:       EKG- without ischemic changes this AM        Physical Exam  Constitutional:       General: He is not in acute distress.     Appearance: He is normal weight.   HENT:      Head: Normocephalic and atraumatic.      Right Ear: External ear normal.      Left Ear: External ear normal.      Nose: Nose normal.      Mouth/Throat:      Pharynx: Oropharynx is clear.   Cardiovascular:      Comments: Normal VAD hum, JVD present but improving   Pulmonary:      Effort: Pulmonary effort is normal.   Abdominal:      General: Abdomen is flat. Bowel sounds are normal. There is no distension (Distended but soft.).      Palpations: Abdomen is soft. There is no mass.      Tenderness: There is no abdominal tenderness. There is right CVA tenderness.   Musculoskeletal:      Cervical back: Normal range of motion.      Right lower leg: No edema.      Left lower leg: No edema.      Comments: Cont b/l back pain- upper spine and b/l paraspinal/upper back muscular pain.    Skin:     General: Skin is warm.      Comments: Chest wall on the right side with gynecomastia, no overlying skin changes, no tenderness at the site however palpable mass versus nodule present.   Neurological:      Mental Status:  He is alert. Mental status is at baseline.            Significant Labs:  CBC:  Recent Labs   Lab 09/03/24 0334 09/04/24 0217 09/05/24 0217   WBC 9.79 12.17 9.34   RBC 4.04* 4.02* 4.08*   HGB 7.8* 7.5* 7.8*   HCT 26.9* 26.8* 27.8*   * 597* 589*   MCV 67* 67* 68*   MCH 19.3* 18.7* 19.1*   MCHC 29.0* 28.0* 28.1*     BNP:  Recent Labs   Lab 08/30/24 0308 09/02/24 0205 09/04/24 0217   * 1,053* 1,170*     CMP:  Recent Labs   Lab 08/30/24 0308 08/31/24 0244 09/02/24 0205 09/03/24 0334 09/04/24 0217 09/04/24 0218 09/05/24 0217   *   < > 41* 143*  --  70 67*   CALCIUM 9.1   < > 9.1 9.9  --  9.6 9.0   ALBUMIN 2.5*  --  2.6*  --  2.6*  --   --    PROT 8.5*  --  8.5*  --  8.5*  --   --    *   < > 130* 128*  --  129* 131*   K 4.1   < > 3.4* 4.9  --  3.9 3.4*   CO2 25   < > 26 27  --  25 27   CL 95   < > 93* 91*  --  92* 91*   BUN 21*   < > 20 22*  --  18 14   CREATININE 1.3   < > 1.2 1.2  --  1.2 1.1   ALKPHOS 221*  --  230*  --  237*  --   --    ALT 11  --  8*  --  8*  --   --    AST 31  --  31  --  30  --   --    BILITOT 2.0*  --  1.9*  --  1.7*  --   --     < > = values in this interval not displayed.      Coagulation:   Recent Labs   Lab 09/03/24 0334 09/04/24 0218 09/05/24 0217   INR 3.3* 3.2* 3.4*   APTT 41.5* 41.3* 43.0*     LDH:  Recent Labs   Lab 09/03/24  0334 09/04/24 0218 09/05/24 0217   * 319* 295*     Microbiology:  Microbiology Results (last 7 days)       Procedure Component Value Units Date/Time    Blood culture [[id number]] Collected: 09/04/24 0217    Order Status: Completed Specimen: Blood Updated: 09/05/24 0613     Blood Culture, Routine No Growth to date      No Growth to date    Blood culture [[id number]] Collected: 08/30/24 1152    Order Status: Completed Specimen: Blood Updated: 09/04/24 1412     Blood Culture, Routine No growth after 5 days.    Narrative:      Rt AC    Blood culture [[id number]]  (Abnormal) Collected: 08/27/24 1751    Order Status:  Completed Specimen: Blood Updated: 09/04/24 1339     Blood Culture, Routine Gram stain dudley bottle: Gram positive cocci in clusters resembling Staph      Results called to and read back by: Lorri Riggs RN  08/29/2024   13:50      Gram stain aer bottle: yeast      Results called to and read back by:Lorri Riggs RN 08/30/2024      STAPHYLOCOCCUS AUREUS  ID consult required at [facility].  For susceptibility see order #[id number]        YEAST   Identification pending  For susceptibility see order #[id number]  ID consult required at [facility].      Narrative:      Blood culture # 2 different location.    Blood culture [[id number]]  (Abnormal) Collected: 09/01/24 0939    Order Status: Completed Specimen: Blood Updated: 09/04/24 1317     Blood Culture, Routine Gram stain dudley bottle: yeast      Results called to and read back by: Rufino 09/03/2024  06:15      CANDIDA PARAPSILOSIS  For susceptibility see order #[id number]  ID consult required at [facility] and [facility].      Blood culture [[id number]]  (Abnormal) Collected: 09/01/24 0939    Order Status: Completed Specimen: Blood Updated: 09/04/24 1015     Blood Culture, Routine Gram stain aer bottle: yeast      Gram stain dudley bottle: yeast      Results called to and read back by: Rufino 09/03/2024  01:45      YEAST   Identification pending  ID consult required at [facility].  For susceptibility see order #[id number]      Blood culture [[id number]]  (Abnormal) Collected: 08/28/24 2226    Order Status: Completed Specimen: Blood Updated: 09/03/24 1014     Blood Culture, Routine Gram stain aer bottle: yeast      Gram stain dudley bottle: yeast      Positive results previously called 08/30/2024      YEAST   Identification pending  For susceptibility see order #[id number]  ID consult required at [facility].      Narrative:      Code  27933  Draw sample # 2 from separate site    Blood culture [[id number]]  (Abnormal) Collected: 08/28/24 2226    Order Status: Completed Specimen: Blood Updated: 09/03/24 1013     Blood Culture, Routine Gram stain aer bottle: yeast      Results called to and read back by:Lorri Riggs RN 08/30/2024  17:10      YEAST   Identification pending  For susceptibility see order #[id number]  ID consult required at [facility].      Narrative:      Code 64492  Draw sample # 2 from separate site    Blood culture [[id number]]  (Abnormal) Collected: 08/30/24 1152    Order Status: Completed Specimen: Blood Updated: 09/03/24 0856     Blood Culture, Routine Gram stain dudley bottle: yeast      Results called to and read back by: Rufino 09/01/2024  02:03      CANDIDA PARAPSILOSIS  ID consult required at [facility] and [facility].  Susceptibility pending      Narrative:      Lft AC    Culture, Anaerobe [[id number]] Collected: 08/27/24 1446    Order Status: Completed Specimen: Skin from Abdomen Updated: 09/02/24 0936     Anaerobic Culture No anaerobes isolated    Narrative:      Driveline site    Blood culture [[id number]]  (Abnormal)  (Susceptibility) Collected: 08/27/24 1802    Order Status: Completed Specimen: Blood Updated: 08/31/24 1049     Blood Culture, Routine Gram stain aer bottle: Gram positive cocci in clusters resembling Staph      Results called to and read back by:Miguelina [facility] RN 08/28/2024  22:59      MICROCOCCUS SPECIES  Organism is a probable contaminant        STAPHYLOCOCCUS AUREUS  ID consult required at [facility] and [facility].      Rapid Organism ID by PCR (from Blood culture) [[id number]] Collected: 08/28/24 2226    Order Status: Completed Updated: 08/30/24 1823     Enterococcus faecalis Not Detected     Enterococcus faecium Not Detected     Listeria monocytogenes Not Detected     Staphylococcus spp. Not Detected     Staphylococcus aureus Not Detected      Staphylococcus epidermidis Not Detected     Staphylococcus lugdunensis Not Detected     Streptococcus species Not Detected     Streptococcus agalactiae Not Detected     Streptococcus pneumoniae Not Detected     Streptococcus pyogenes Not Detected     Acinetobacter calcoaceticus/baumannii complex Not Detected     Bacteroides fragilis Not Detected     Enterobacterales Not Detected     Enterobacter cloacae complex Not Detected     Escherichia coli Not Detected     Klebsiella aerogenes Not Detected     Klebsiella oxytoca Not Detected     Klebsiella pneumoniae group Not Detected     Proteus Not Detected     Salmonella sp Not Detected     Serratia marcescens Not Detected     Haemophilus influenzae Not Detected     Neisseria meningtidis Not Detected     Pseudomonas aeruginosa Not Detected     Stenotrophomonas maltophilia Not Detected     Candida albicans Not Detected     Candida auris Not Detected     Candida glabrata Not Detected     Candida krusei Not Detected     Candida parapsilosis Not Detected     Candida tropicalis Not Detected     Cryptococcus neoformans/gattii Not Detected     CTX-M (ESBL ) Test Not Applicable     IMP (Carbapenem resistant) Test Not Applicable     KPC resistance gene (Carbapenem resistant) Test Not Applicable     mcr-1  Test Not Applicable     mec A/C  Test Not Applicable     mec A/C and MREJ (MRSA) gene Test Not Applicable     NDM (Carbapenem resistant) Test Not Applicable     OXA-48-like (Carbapenem resistant) Test Not Applicable     van A/B (VRE gene) Test Not Applicable     VIM (Carbapenem resistant) Test Not Applicable    Narrative:      Code 47939  Draw sample # 2 from separate site    Culture, Anaerobe [[id number]] Collected: 08/25/24 1733    Order Status: Completed Specimen: Wound from Abdomen Updated: 08/30/24 1405     Anaerobic Culture No anaerobes isolated    Narrative:      Drive line exit site    Aerobic culture [[id number]]  (Abnormal)  (Susceptibility) Collected:  08/27/24 1446    Order Status: Completed Specimen: Skin from Abdomen Updated: 08/29/24 1424     Aerobic Bacterial Culture STAPHYLOCOCCUS AUREUS  Rare      Narrative:      Driveline site            I have reviewed all pertinent labs within the past 24 hours.    Estimated Creatinine Clearance: 94.8 mL/min (based on SCr of 1.1 mg/dL).    Diagnostic Results:  I have reviewed and interpreted all pertinent imaging results/findings within the past 24 hours.  Assessment and Plan:     Mr. Kevan Thacker is a 39 year old male with stage D 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 He underwent RVAD removal and chest closure 6/30/2022.  He was weaned off  but he had to restarted due to RVF. He was eventually transitioned to milrinone (secondary to  shortage) now supposed to be on 0.25 mcg/kg/min. He has a history of unclear syncope vs seizures and is followed by neuro for this and is on Keppra.  [facility] being [facility] with ongoing [facility] pain.      On 11/15/23 underwent removal of eroded [employer] scICD--no Lifevest (due to LVAD) and no plan to replace ICD as not requiring therapy post LVAD with concern for reinfection.  He has not had neurology f/u with seizure hx on keppra so coordinator to assist him with obtaining appt.      Reports back pain (primarily upper back pain) for the past 3-4 days. He reports that it started 2 days after stopping pirmacor and he feels it may be related. He has also been sleeping in the couch and identifies that it may also be related to the back pain. He denies lifting anything heavy or any falls /trauma. No red flags including incontinence of stool or urine. No numbness in the groin area or weakness in the legs or  upper extremity reported. He reports he was not able to go for appointment because of transport issue that has now been resolved. He denies orthopnea, PND, weight gain, leg swelling. He says that he does have some shortness of breath after walking long distances which has been ongoing for months now and has not changed lately. He has lost some weight over last few months since his but weight has been stable over the last month. He denied headache, constipation, diarrhea, SOB, cough, palpitations or any additional symptoms.     * LVAD (left ventricular assist device) present  -HeartMate 3 Implanted  6/29/2022  as DT  -HTS Primary  -cont coumadin, Goal INR 2.0-3.0. Supratherapeutic this AM. Letting INR come down some but have resumed coumadin  Lab Results   Component Value Date    INR 3.4 (H) 09/05/2024    INR 3.2 (H) 09/04/2024    INR 3.3 (H) 09/03/2024       -Antiplatelets ASA 81 mg  -LDH is stable overall today. Will continue to monitor daily.  -Speed set at 5100, LSL 4700 rpm  -Interrogation notable for  power cable disconnected, low voltage advisory, PI events  -Not listed for OHTx- advised pt he would not be candidate for OHTx (noncompliance/poor f/up)   -UDS negative  -attempted to call partner Robyn, unfortunately goes to        Procedure: Device Interrogation Including analysis of device parameters  Current Settings: Ventricular Assist Device  Review of device function is stable/unstable stable        9/5/2024     8:22 AM 9/5/2024     5:08 AM 9/4/2024    11:00 PM 9/4/2024     7:45 PM 9/4/2024     4:00 PM 9/4/2024    11:48 AM 9/4/2024     8:26 AM   TXP LVAD INTERROGATIONS   Type HeartMate3 HeartMate3 HeartMate3 HeartMate3 HeartMate3 HeartMate3 HeartMate3   Flow  4.3 3.8 4.4 3.9 4 4.3   Speed 5100 5100 5150 5100 5100 5100 5100   PI 3.7 4.3 5.4 4.5 6 6 4.7   Power (Serna) 3.6 3.5 5.3 3.6 3.5 3.5 3.5   LSL 4700 4700 4700 4700 4700 4700 4700   Pulsatility Intermittent pulse               Pulmonary  nodules  Multiple pulmonary nodules seen on CT scan with contrast. Pt does have hx tobacco use. Lymph node enlargement noted as well. Pt has weight loss but not drastic  -need f/up scan in 3-6 months to assess stability of nodules     Pyelonephritis  Seen on ct with contrast. ID following.   -cont abx per ID   -no need for percutaneous drain per IR at this time unless pt decompensates  -current bacteremia/yeast not from renal source     Severe sepsis  -CT chest/abdomen and pelvis w/ contrast w/ pyelonephritis and renal abscess   -repeat CT spine with contrast +visualization of kidney read is stable, without worsening pyelo or abscess, no evidence of discitis    Plan:  -F/up cultures- +ve for staph, neg MRSA pcr, yeast now growing in blood as well  -Follow repeat cultures. As of 9/1 still fungemic, susceptibilities pending  -ID following-c/w cefazolin, fluconazole  -C/s to Optho for eval of endopthalmitis in setting of yeast in blood   -Removed PICC on 8/28  -Driveline infection vs pyelo vs PICC related  - TTE negative for endocarditis      Atrial flutter  pt previously went into atach vs aflutter. Pt was started on amiodarone, with rhythm aborted to NSR following bolus. BP remained stable.   -BMP, replace lytes as needed   -cont amiodarone- will complete load and keep on maintenance dosing thereafter  -pt does report intermittently at home having episodes of palpitations that may also represent paroxysmal afib vs flutter at home. Already on a/c. Chadvasc of 5          Polysubstance abuse  Pt has history of polysubstance use.   -UDS negative  -prn oxycodone for pyelonephritis pain x1 per day max given hx substance use       Subcutaneous nodule of breast  Pt with unilateral R sided gynecomastia with hard nodular feeling beneath soft subcutaneous tissue of breast. No notable skin dimpling or rash.   CT with contrast reporting gyneocomastia without fluid collection/abscess. Pulmonary nodules seen on exam (needs f/up  scan in 3-6 mos), enlarged lymph nodes noted on scan as well. D/w pt need for continued f/up outpatient for cancer screening. Appears to be more reactive than malignant on most recent scan.  -transition from spironolactone to epleronone     Supratherapeutic INR  -Goal INR 2-3 , INR 4.7 on admission - improving  - pharmacy  to manage coumadin dosing, cont warfarin   Check INR daily     Lab Results   Component Value Date    INR 3.2 (H) 09/04/2024    INR 3.3 (H) 09/03/2024    INR 2.8 (H) 09/02/2024         Bilateral back pain  Pt reports initial pain following running out of primacor. S/p spinal Xrays without significant abnormality besides degenerative disc disease.  NO red flag symptoms on admission  CT spine cervical, thoracic and lumbar negative for discitis/infectious concern and renal collection/pyelo appears to be unchanged.     Plan:  - Multimodal pain control  - titrate to patient's response  - C/w home gabapentin, tylenol prn and lidocaine patches.   - C/w Prn oxycodone added  - has not been using   - Schedule muscle relaxant overnight and assess for change in symptoms.   - Back pain more pronounced on R side, most likely cause is his pyelonephritis  -c/w abx for pyelonephritis        Hypokalemia  Replace as needed and monitor      Type 2 diabetes mellitus with hyperglycemia, with long-term current use of insulin  A1C 10.3  Endocrinology consulted for glucose management, largely uncontrolled       Lab Results   Component Value Date    HGBA1C 10.3 (H) 08/25/2024    HGBA1C >14.0 (H) 04/26/2024    HGBA1C >14.0 (H) 02/17/2024    [id number] 7.5 06/12/2023    [id number] 8.5 02/07/2023           Moderate malnutrition  -pt with aggressive care goals  -c/s nutrition, appreciate assistance     Seizure-like activity  History   C/w home keppra 1.5 gm BID    CHF (NYHA class IV, ACC/AHA stage D)    Updated 8/26/24:  EF 5-10% global hypokinesis, RV enlargement and global hypokinesis, biatrial enlargement, mild-to-moderate MR,  severe TR, no van HeartMate 3 in place, aortic valve does not open, LVEDd 7.2  Previous TTE 9/5/23 with EF 10-15%, mod to sever TR, LVEDd 7.11, LVAD - HM3 in place    -Appears hypervolemic today so will diurese with IV lasix while undergoing inpatient treatment for bacteremia and fungemia   -GDMT: d/c spironolactone given possible gynecomastia, transition to epleronone   -Previously on home Primacor 0.25 at home but ran out approx 5 days prior to admission and also reports he occasionally disconnects himself from his pump to perform certain activities. Will plan to d/c off primacor   - TTE With LVEF 5-10%, poor RV function, dilation, no signs of endocarditis on TTE   -palliative care c/s and pt discussed with service, appreciate f/up- plan for continued follow up with palliative outpatient as well with Dr. Carr   -repeated [facility] discussion on 8/31- full code, aggressive care, pt educated about compliance going forward - re-iterated. Pt understanding, apologetic for previous noncompliance. Pt understands he is not a candidate for heart transplant and will not be evaluated due to his noncompliance.           Jeff Spencer PA-C  Heart Transplant  [facility] - Cardiology Stepdown

## 2024-09-05 NOTE — ASSESSMENT & PLAN NOTE
-HeartMate 3 Implanted  6/29/2022  as DT  -HTS Primary  -cont coumadin, Goal INR 2.0-3.0. Supratherapeutic this AM. Letting INR come down some but have resumed coumadin  Lab Results   Component Value Date    INR 3.4 (H) 09/05/2024    INR 3.2 (H) 09/04/2024    INR 3.3 (H) 09/03/2024       -Antiplatelets ASA 81 mg  -LDH is stable overall today. Will continue to monitor daily.  -Speed set at 5100, LSL 4700 rpm  -Interrogation notable for  power cable disconnected, low voltage advisory, PI events  -Not listed for OHTx- advised pt he would not be candidate for OHTx (noncompliance/poor f/up)   -UDS negative  -attempted to call partner Robyn, unfortunately goes to        Procedure: Device Interrogation Including analysis of device parameters  Current Settings: Ventricular Assist Device  Review of device function is stable/unstable stable        9/5/2024     8:22 AM 9/5/2024     5:08 AM 9/4/2024    11:00 PM 9/4/2024     7:45 PM 9/4/2024     4:00 PM 9/4/2024    11:48 AM 9/4/2024     8:26 AM   TXP LVAD INTERROGATIONS   Type HeartMate3 HeartMate3 HeartMate3 HeartMate3 HeartMate3 HeartMate3 HeartMate3   Flow  4.3 3.8 4.4 3.9 4 4.3   Speed 5100 5100 5150 5100 5100 5100 5100   PI 3.7 4.3 5.4 4.5 6 6 4.7   Power (Serna) 3.6 3.5 5.3 3.6 3.5 3.5 3.5   LSL 4700 4700 4700 4700 4700 4700 4700   Pulsatility Intermittent pulse

## 2024-09-05 NOTE — SUBJECTIVE & OBJECTIVE
Interval History:   No complaints this AM or events overnight. Repeat Bcx's yesterday NGTD but previous ones showing persistent fungemia so micafungin switched to fluconazole. JVP appears elevated so will diurese with IV lasix     Continuous Infusions: none       Scheduled Meds:   [START ON 9/9/2024] amiodarone  200 mg Oral Daily    amiodarone  400 mg Oral BID    amLODIPine  5 mg Oral Daily    aspirin  81 mg Oral Daily    atorvastatin  40 mg Oral Daily    bumetanide  2 mg Oral BID loop    ceFAZolin (Ancef) IV (PEDS and ADULTS)  2 g Intravenous Q8H    cyclobenzaprine  5 mg Oral BID    DULoxetine  30 mg Oral Daily    eplerenone  25 mg Oral Daily    gabapentin  100 mg Oral BID    gabapentin  400 mg Oral QHS    insulin aspart U-100  10 Units Subcutaneous TIDWM    insulin glargine U-100  10 Units Subcutaneous BID    levETIRAcetam  1,500 mg Oral BID    LIDOcaine  2 patch Transdermal Q24H    magnesium oxide  400 mg Oral Daily    nicotine  1 patch Transdermal Daily    pantoprazole  40 mg Oral Daily    polyethylene glycol  17 g Oral Daily    warfarin  1 mg Oral Daily     PRN Meds:  Current Facility-Administered Medications:     acetaminophen, 650 mg, Oral, Q6H PRN    dextrose 10%, 12.5 g, Intravenous, PRN    dextrose 10%, 25 g, Intravenous, PRN    glucagon (human recombinant), 1 mg, Intramuscular, PRN    glucose, 16 g, Oral, PRN    glucose, 24 g, Oral, PRN    insulin aspart U-100, 0-10 Units, Subcutaneous, QID (AC + HS) PRN    ondansetron, 4 mg, Oral, Q8H PRN    oxyCODONE, 10 mg, Oral, Daily PRN    Review of patient's allergies indicates:   Allergen Reactions    Torsemide Hives     Objective:     Vital Signs (Most Recent):  Temp: 98.1 °F (36.7 °C) (09/05/24 0746)  Pulse: 85 (09/05/24 0815)  Resp: (!) 24 (09/05/24 0815)  BP: (!) 90/0 (09/05/24 0815)  SpO2: 98 % (09/05/24 0746) Vital Signs (24h Range):  Temp:  [98.1 °F (36.7 °C)-98.7 °F (37.1 °C)] 98.1 °F (36.7 °C)  Pulse:  [78-88] 85  Resp:  [18-24] 24  SpO2:  [96 %-99 %] 98  %  BP: ()/(0-60) 90/0     Patient Vitals for the past 72 hrs (Last 3 readings):   Weight   09/03/24 0515 74.3 kg (163 lb 12.8 oz)   09/02/24 1145 73.1 kg (161 lb 2.5 oz)       Body mass index is 20.47 kg/m².      Intake/Output Summary (Last 24 hours) at 9/5/2024 1029  Last data filed at 9/5/2024 0119  Gross per 24 hour   Intake 702 ml   Output 2100 ml   Net -1398 ml       Hemodynamic Parameters:       EKG- without ischemic changes this AM        Physical Exam  Constitutional:       General: He is not in acute distress.     Appearance: He is normal weight.   HENT:      Head: Normocephalic and atraumatic.      Right Ear: External ear normal.      Left Ear: External ear normal.      Nose: Nose normal.      Mouth/Throat:      Pharynx: Oropharynx is clear.   Cardiovascular:      Comments: Normal VAD hum, JVD present but improving   Pulmonary:      Effort: Pulmonary effort is normal.   Abdominal:      General: Abdomen is flat. Bowel sounds are normal. There is no distension (Distended but soft.).      Palpations: Abdomen is soft. There is no mass.      Tenderness: There is no abdominal tenderness. There is right CVA tenderness.   Musculoskeletal:      Cervical back: Normal range of motion.      Right lower leg: No edema.      Left lower leg: No edema.      Comments: Cont b/l back pain- upper spine and b/l paraspinal/upper back muscular pain.    Skin:     General: Skin is warm.      Comments: Chest wall on the right side with gynecomastia, no overlying skin changes, no tenderness at the site however palpable mass versus nodule present.   Neurological:      Mental Status: He is alert. Mental status is at baseline.            Significant Labs:  CBC:  Recent Labs   Lab 09/03/24  0334 09/04/24  0217 09/05/24  0217   WBC 9.79 12.17 9.34   RBC 4.04* 4.02* 4.08*   HGB 7.8* 7.5* 7.8*   HCT 26.9* 26.8* 27.8*   * 597* 589*   MCV 67* 67* 68*   MCH 19.3* 18.7* 19.1*   MCHC 29.0* 28.0* 28.1*     BNP:  Recent Labs   Lab  08/30/24  0308 09/02/24 0205 09/04/24 0217   * 1,053* 1,170*     CMP:  Recent Labs   Lab 08/30/24  0308 08/31/24  0244 09/02/24  0205 09/03/24 0334 09/04/24 0217 09/04/24 0218 09/05/24 0217   *   < > 41* 143*  --  70 67*   CALCIUM 9.1   < > 9.1 9.9  --  9.6 9.0   ALBUMIN 2.5*  --  2.6*  --  2.6*  --   --    PROT 8.5*  --  8.5*  --  8.5*  --   --    *   < > 130* 128*  --  129* 131*   K 4.1   < > 3.4* 4.9  --  3.9 3.4*   CO2 25   < > 26 27  --  25 27   CL 95   < > 93* 91*  --  92* 91*   BUN 21*   < > 20 22*  --  18 14   CREATININE 1.3   < > 1.2 1.2  --  1.2 1.1   ALKPHOS 221*  --  230*  --  237*  --   --    ALT 11  --  8*  --  8*  --   --    AST 31  --  31  --  30  --   --    BILITOT 2.0*  --  1.9*  --  1.7*  --   --     < > = values in this interval not displayed.      Coagulation:   Recent Labs   Lab 09/03/24 0334 09/04/24 0218 09/05/24 0217   INR 3.3* 3.2* 3.4*   APTT 41.5* 41.3* 43.0*     LDH:  Recent Labs   Lab 09/03/24 0334 09/04/24 0218 09/05/24 0217   * 319* 295*     Microbiology:  Microbiology Results (last 7 days)       Procedure Component Value Units Date/Time    Blood culture [[id number]] Collected: 09/04/24 0217    Order Status: Completed Specimen: Blood Updated: 09/05/24 0613     Blood Culture, Routine No Growth to date      No Growth to date    Blood culture [[id number]] Collected: 08/30/24 1152    Order Status: Completed Specimen: Blood Updated: 09/04/24 1412     Blood Culture, Routine No growth after 5 days.    Narrative:      Rt AC    Blood culture [[id number]]  (Abnormal) Collected: 08/27/24 1751    Order Status: Completed Specimen: Blood Updated: 09/04/24 1339     Blood Culture, Routine Gram stain dudley bottle: Gram positive cocci in clusters resembling Staph      Results called to and read back by: Lorri Riggs RN  08/29/2024   13:50      Gram stain aer bottle: yeast      Results called to and read back by:Lorri Riggs RN 08/30/2024      STAPHYLOCOCCUS AUREUS  ID  consult required at [facility].  For susceptibility see order #[id number]        YEAST   Identification pending  For susceptibility see order #[id number]  ID consult required at [facility].      Narrative:      Blood culture # 2 different location.    Blood culture [[id number]]  (Abnormal) Collected: 09/01/24 0939    Order Status: Completed Specimen: Blood Updated: 09/04/24 1317     Blood Culture, Routine Gram stain dudley bottle: yeast      Results called to and read back by: Rufino 09/03/2024  06:15      CANDIDA PARAPSILOSIS  For susceptibility see order #[id number]  ID consult required at [facility].      Blood culture [[id number]]  (Abnormal) Collected: 09/01/24 0939    Order Status: Completed Specimen: Blood Updated: 09/04/24 1015     Blood Culture, Routine Gram stain aer bottle: yeast      Gram stain dudley bottle: yeast      Results called to and read back by: Rufino 09/03/2024  01:45      YEAST   Identification pending  ID consult required at [facility].  For susceptibility see order #[id number]      Blood culture [[id number]]  (Abnormal) Collected: 08/28/24 2226    Order Status: Completed Specimen: Blood Updated: 09/03/24 1014     Blood Culture, Routine Gram stain aer bottle: yeast      Gram stain dudley bottle: yeast      Positive results previously called 08/30/2024      YEAST   Identification pending  For susceptibility see order #[id number]  ID consult required at [facility].      Narrative:      Code 20989  Draw sample # 2 from separate site    Blood culture [[id number]]  (Abnormal) Collected: 08/28/24 2226    Order Status: Completed Specimen: Blood Updated: 09/03/24 1013     Blood Culture, Routine Gram stain aer bottle: yeast      Results called to and read back by:Lorri Riggs RN 08/30/2024  17:10      YEAST   Identification pending  For susceptibility  see order #[id number]  ID consult required at [facility].      Narrative:      Code 38593  Draw sample # 2 from separate site    Blood culture [[id number]]  (Abnormal) Collected: 08/30/24 1152    Order Status: Completed Specimen: Blood Updated: 09/03/24 0856     Blood Culture, Routine Gram stain dudley bottle: yeast      Results called to and read back by: Rufino 09/01/2024  02:03      CANDIDA PARAPSILOSIS  ID consult required at [facility].  Susceptibility pending      Narrative:      Lft AC    Culture, Anaerobe [[id number]] Collected: 08/27/24 1446    Order Status: Completed Specimen: Skin from Abdomen Updated: 09/02/24 0936     Anaerobic Culture No anaerobes isolated    Narrative:      Driveline site    Blood culture [[id number]]  (Abnormal)  (Susceptibility) Collected: 08/27/24 1802    Order Status: Completed Specimen: Blood Updated: 08/31/24 1049     Blood Culture, Routine Gram stain aer bottle: Gram positive cocci in clusters resembling Staph      Results called to and read back by:Miguelina Thomas RN 08/28/2024  22:59      MICROCOCCUS SPECIES  Organism is a probable contaminant        STAPHYLOCOCCUS AUREUS  ID consult required at [facility].      Rapid Organism ID by PCR (from Blood culture) [[id number]] Collected: 08/28/24 2226    Order Status: Completed Updated: 08/30/24 1823     Enterococcus faecalis Not Detected     Enterococcus faecium Not Detected     Listeria monocytogenes Not Detected     Staphylococcus spp. Not Detected     Staphylococcus aureus Not Detected     Staphylococcus epidermidis Not Detected     Staphylococcus lugdunensis Not Detected     Streptococcus species Not Detected     Streptococcus agalactiae Not Detected     Streptococcus pneumoniae Not Detected     Streptococcus pyogenes Not Detected     Acinetobacter calcoaceticus/baumannii complex Not Detected     Bacteroides fragilis Not Detected      Enterobacterales Not Detected     Enterobacter cloacae complex Not Detected     Escherichia coli Not Detected     Klebsiella aerogenes Not Detected     Klebsiella oxytoca Not Detected     Klebsiella pneumoniae group Not Detected     Proteus Not Detected     Salmonella sp Not Detected     Serratia marcescens Not Detected     Haemophilus influenzae Not Detected     Neisseria meningtidis Not Detected     Pseudomonas aeruginosa Not Detected     Stenotrophomonas maltophilia Not Detected     Candida albicans Not Detected     Candida auris Not Detected     Candida glabrata Not Detected     Candida krusei Not Detected     Candida parapsilosis Not Detected     Candida tropicalis Not Detected     Cryptococcus neoformans/gattii Not Detected     CTX-M (ESBL ) Test Not Applicable     IMP (Carbapenem resistant) Test Not Applicable     KPC resistance gene (Carbapenem resistant) Test Not Applicable     mcr-1  Test Not Applicable     mec A/C  Test Not Applicable     mec A/C and MREJ (MRSA) gene Test Not Applicable     NDM (Carbapenem resistant) Test Not Applicable     OXA-48-like (Carbapenem resistant) Test Not Applicable     van A/B (VRE gene) Test Not Applicable     VIM (Carbapenem resistant) Test Not Applicable    Narrative:      Code 43934  Draw sample # 2 from separate site    Culture, Anaerobe [[id number]] Collected: 08/25/24 1733    Order Status: Completed Specimen: Wound from Abdomen Updated: 08/30/24 1405     Anaerobic Culture No anaerobes isolated    Narrative:      Drive line exit site    Aerobic culture [[id number]]  (Abnormal)  (Susceptibility) Collected: 08/27/24 1446    Order Status: Completed Specimen: Skin from Abdomen Updated: 08/29/24 1424     Aerobic Bacterial Culture STAPHYLOCOCCUS AUREUS  Rare      Narrative:      Driveline site            I have reviewed all pertinent labs within the past 24 hours.    Estimated Creatinine Clearance: 94.8 mL/min (based on SCr of 1.1 mg/dL).    Diagnostic Results:  I  have reviewed and interpreted all pertinent imaging results/findings within the past 24 hours.

## 2024-09-05 NOTE — ASSESSMENT & PLAN NOTE
Updated 8/26/24:  EF 5-10% global hypokinesis, RV enlargement and global hypokinesis, biatrial enlargement, mild-to-moderate MR, severe TR, no van HeartMate 3 in place, aortic valve does not open, LVEDd 7.2  Previous TTE 9/5/23 with EF 10-15%, mod to sever TR, LVEDd 7.11, LVAD - HM3 in place    -Appears hypervolemic today so will diurese with IV lasix while undergoing inpatient treatment for bacteremia and fungemia   -GDMT: d/c spironolactone given possible gynecomastia, transition to epleronone   -Previously on home Primacor 0.25 at home but ran out approx 5 days prior to admission and also reports he occasionally disconnects himself from his pump to perform certain activities. Will plan to d/c off primacor   - TTE With LVEF 5-10%, poor RV function, dilation, no signs of endocarditis on TTE   -palliative care c/s and pt discussed with service, appreciate f/up- plan for continued follow up with palliative outpatient as well with Dr. Carr   -repeated [facility] discussion on 8/31- full code, aggressive care, pt educated about compliance going forward - re-iterated. Pt understanding, apologetic for previous noncompliance. Pt understands he is not a candidate for heart transplant and will not be evaluated due to his noncompliance.

## 2024-09-05 NOTE — PROGRESS NOTES
09/05/2024  John Alaniz    Current provider:  Natalya Villatoro MD    Device interrogation:      9/5/2024    11:38 AM 9/5/2024     8:22 AM 9/5/2024     5:08 AM 9/4/2024    11:00 PM 9/4/2024     7:45 PM 9/4/2024     4:00 PM 9/4/2024    11:48 AM   TXP LVAD INTERROGATIONS   Type HeartMate3 HeartMate3 HeartMate3 HeartMate3 HeartMate3 HeartMate3 HeartMate3   Flow 4  4.3 3.8 4.4 3.9 4   Speed 5100 5100 5100 5150 5100 5100 5100   PI 5.7 3.7 4.3 5.4 4.5 6 6   Power (Serna) 3.6 3.6 3.5 5.3 3.6 3.5 3.5   LSL 4700 4700 4700 4700 4700 4700 4700   Pulsatility Intermittent pulse Intermittent pulse               Rounded on Kevan Queen to ensure all mechanical assist device settings (IABP or VAD) were appropriate and all parameters were within limits.  I was able to ensure all back up equipment was present, the staff had no issues, and the Perfusion Department daily rounding was complete.      For implantable VADs: Interrogation of Ventricular assist device was performed with analysis of device parameters and review of device function. I have personally reviewed the interrogation findings and agree with findings as stated.     In emergency, the nursing units have been notified to contact the perfusion department either by:  Calling [id number] from 630am to 4pm Mon thru Fri, utilizing the On-Call Finder functionality of Epic and searching for Perfusion, or by contacting the hospital  from 4pm to 630am and on weekends and asking to speak with the perfusionist on call.    2:29 PM

## 2024-09-05 NOTE — PLAN OF CARE
Plan of care discussed with patient.  Patient ambulating with standby assist, fall precautions in place. LVAD DP and numbers WNL, smooth LVAD hum. Discussed medications and care. Patient complaint of back pain 10/10, gave PRN meds. Patient has no questions at this time. Will continue to follow care.     Problem: Adult Inpatient Plan of Care  Goal: Plan of Care Review  Outcome: Progressing  Goal: Patient-Specific Goal (Individualized)  Outcome: Progressing  Goal: Absence of Hospital-Acquired Illness or Injury  Outcome: Progressing  Goal: Optimal Comfort and Wellbeing  Outcome: Progressing  Goal: Readiness for Transition of Care  Outcome: Progressing     Problem: Diabetes Comorbidity  Goal: Blood Glucose Level Within Targeted Range  Outcome: Progressing

## 2024-09-05 NOTE — PLAN OF CARE
Chart reviewed.  1.Fungemia  BCx 9/4 remains ngtd  --continue fluconazole for c parapsilosis fungemia  --reconsult optha if presents with vision changes  --f/u susceptibilities of yeast    2. MSSA bacteremia  --continue cefazolin to target mssa in blood. Anticipate 6 wks  --will need line placement for IV abx once cultures clear    ID will follow.   Kavya Carrion MD  Infectious Disease

## 2024-09-05 NOTE — PROGRESS NOTES
[facility] - Cardiology Stepdown  Endocrinology  Progress Note    Admit Date: 8/25/2024     Reason for Consult: Management of T2DM, Hyperglycemia     Surgical Procedure and Date: LVAD 06/29/2022     Diabetes diagnosis year: 2022    Home Diabetes Medications:  Levemir 20 units twice daily and Novolog SSI (150/25) per patient    How often checking glucose at home?  Has not checked in a few weeks due to running out of strips    BG readings on regimen: COLLIN  Hypoglycemia on the regimen?  Yes; rarely experiences hypoglycemic symptoms such as blurry vision and vomiting. However, does not know his blood sugar level due to running out of supplies. He will self-correct with a snack.  Missed doses on regimen?  Yes, has not had insulin in a few weeks due to running out of glucometer supplies.    Diabetes Complications include:   Hyperglycemia    Complicating diabetes co morbidities:   History of MI, CHF, and CKD      HPI:   Patient is a 39 y.o. 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 He underwent RVAD removal and chest closure 6/30/2022.  He was weaned off  but restarted due to RVF. He was transitioned to milrinone (secondary to  shortage). History of unclear syncope vs seizures and followed by neuro and is on Keppra. On 11/15/23 underwent removal of eroded [employer] scICD--no Lifevest (due to LVAD) and no plan to replace ICD as not requiring therapy post LVAD with concern for reinfection. Reported back pain (primarily upper back pain) for the past 3-4 days. He reported that it started 2 days after stopping pirmacor and he felt it may be related. Reported some shortness of breath after walking long distances which has been ongoing for months now and has not changed lately. He has lost some weight over last few months since his  "but weight has been stable over the last month. Patient admitted after being out of [facility] for 1 week with ongoing back pain. Patient stated he has not taken his insulin in a few weeks due to running out of glucometer testing supplies. He previously had a William, but is not wearing one. BG >700 at [facility]. Endo consulted for BG management.      Interval HPI:   Hypoglycemic overnight. Patient in room 319/319 A. Blood glucose  variable . BG at, above, and below goal on current insulin regimen (SSI, prandial, and basal insulin ). Steroid use- None.    Renal function- Normal   Lab Results   Component Value Date    CREATININE 1.1 2024     Vasopressors-  None     Endocrine will continue to follow and manage insulin orders inpatient.     Diet diabetic Cardiac (Low Na/Chol); 2000 Calorie; Fluid - 1500mL; Standard Tray     Eatin%  Nausea: No  Hypoglycemia and intervention: Yes; Random BG 67 at 0217. No notes or correction noted in chart. Subsequent POCT glucose 114 at 0643.  Fever: No  TPN and/or TF: No  If yes, type of TF/TPN and rate: N/A    BP (!) 90/0 (BP Location: Left arm, Patient Position: Lying)   Pulse 85   Temp 98.1 °F (36.7 °C) (Oral)   Resp (!) 24   Ht 6' 3" (1.905 m)   Wt 74.3 kg (163 lb 12.8 oz)   SpO2 98%   BMI 20.47 kg/m²     Labs Reviewed and Include    Recent Labs   Lab 24   GLU 67*   CALCIUM 9.0   *   K 3.4*   CO2 27   CL 91*   BUN 14   CREATININE 1.1     Lab Results   Component Value Date    WBC 9.34 2024    HGB 7.8 (L) 2024    HCT 27.8 (L) 2024    MCV 68 (L) 2024     (H) 2024     No results for input(s): "TSH", "FREET4" in the last 168 hours.  Lab Results   Component Value Date    HGBA1C 10.3 (H) 2024       Nutritional status:   Body mass index is 20.47 kg/m².  Lab Results   Component Value Date    ALBUMIN 2.6 (L) 2024    ALBUMIN 2.6 (L) 2024    ALBUMIN 2.5 (L) 2024     Lab Results   Component " Value Date    PREALBUMIN 10 (L) 09/04/2024    PREALBUMIN 10 (L) 09/02/2024    PREALBUMIN 9 (L) 08/30/2024       Estimated Creatinine Clearance: 94.8 mL/min (based on SCr of 1.1 mg/dL).    Accu-Checks  Recent Labs     09/03/24  0627 09/03/24  1124 09/03/24  1632 09/03/24  1942 09/04/24  0705 09/04/24  1149 09/04/24  1153 09/04/24  1628 09/04/24  2018 09/05/24  0643   POCTGLUCOSE 185* 156* 223* 115* 89 >500* 370* 111* 149* 114*       Current Medications and/or Treatments Impacting Glycemic Control  Immunotherapy:    Immunosuppressants       None          Steroids:   Hormones (From admission, onward)      None          Pressors:    Autonomic Drugs (From admission, onward)      None          Hyperglycemia/Diabetes Medications:   Antihyperglycemics (From admission, onward)      Start     Stop Route Frequency Ordered    09/05/24 2100  insulin glargine U-100 (Lantus) pen 10 Units         -- SubQ 2 times daily 09/05/24 1005    09/02/24 1130  insulin aspart U-100 pen 10 Units         -- SubQ 3 times daily with meals 09/02/24 0841    08/25/24 1506  insulin aspart U-100 pen 0-10 Units         -- SubQ Before meals & nightly PRN 08/25/24 1407            ASSESSMENT and PLAN    Cardiac/Vascular  * LVAD (left ventricular assist device) present  Managed by primary team  Optimize blood glucose control        CHF (NYHA class IV, ACC/AHA stage D)  Managed by primary team  Optimize blood glucose control      Endocrine  Type 2 diabetes mellitus with hyperglycemia, with long-term current use of insulin  BG goal 140-180  Noncompliant T2DM. Random BG 67 at 0217. Cake noted at bedside. Advised patient to avoid high carb foods/drinks.    Plan:   - Decrease Lantus (Insulin Glargine) to 10 units BID (20% decrease; fasting BG below goal ranges)   - Novolog (Insulin Aspart) 10 units TIDWM.HOLD if BG < 100  - Continue moderate dose correction (150/25)  - BG checks AC/HS  - Hypoglycemia protocol in place    ** Please notify Endocrine for any change  and/or advance in diet**  ** Please call Endocrine for any BG related issues **    Discharge Planning:   TBD. Please notify endocrinology prior to discharge.  Patient requires glucometer and testing supplies.  Recommend he resumes his William for blood sugar monitoring.          Guera De Oliveira PA-C  Endocrinology  [facility] - Cardiology Stepdown

## 2024-09-05 NOTE — PROGRESS NOTES
Update    Pt presents in room aaox4 with open affect. Caregiver not present at this time. Pt voices understanding of plan of care. Pt reports gabapentin has made him tired. Pt reports coping well and denies further needs, questions, concerns at this time and none indicated. Providing ongoing psychosocial and counseling support, education, resources, assistance and discharge planning as indicated. Following and available.

## 2024-09-05 NOTE — ASSESSMENT & PLAN NOTE
BG goal 140-180  Noncompliant T2DM. Random BG 67 at 0217. Cake noted at bedside. Advised patient to avoid high carb foods/drinks.    Plan:   - Decrease Lantus (Insulin Glargine) to 10 units BID (20% decrease; fasting BG below goal ranges)   - Novolog (Insulin Aspart) 10 units TIDWM.HOLD if BG < 100  - Continue moderate dose correction (150/25)  - BG checks AC/HS  - Hypoglycemia protocol in place    ** Please notify Endocrine for any change and/or advance in diet**  ** Please call Endocrine for any BG related issues **    Discharge Planning:   TBD. Please notify endocrinology prior to discharge.  Patient requires glucometer and testing supplies.  Recommend he resumes his William for blood sugar monitoring.

## 2024-09-05 NOTE — SUBJECTIVE & OBJECTIVE
"Interval HPI:   Hypoglycemic overnight. Patient in room 319/319 A. Blood glucose  variable . BG at, above, and below goal on current insulin regimen (SSI, prandial, and basal insulin ). Steroid use- None.    Renal function- Normal   Lab Results   Component Value Date    CREATININE 1.1 2024     Vasopressors-  None     Endocrine will continue to follow and manage insulin orders inpatient.     Diet diabetic Cardiac (Low Na/Chol); 2000 Calorie; Fluid - 1500mL; Standard Tray     Eatin%  Nausea: No  Hypoglycemia and intervention: Yes; Random BG 67 at 0217. No notes or correction noted in chart. Subsequent POCT glucose 114 at 0643.  Fever: No  TPN and/or TF: No  If yes, type of TF/TPN and rate: N/A    BP (!) 90/0 (BP Location: Left arm, Patient Position: Lying)   Pulse 85   Temp 98.1 °F (36.7 °C) (Oral)   Resp (!) 24   Ht 6' 3" (1.905 m)   Wt 74.3 kg (163 lb 12.8 oz)   SpO2 98%   BMI 20.47 kg/m²     Labs Reviewed and Include    Recent Labs   Lab 24   GLU 67*   CALCIUM 9.0   *   K 3.4*   CO2 27   CL 91*   BUN 14   CREATININE 1.1     Lab Results   Component Value Date    WBC 9.34 2024    HGB 7.8 (L) 2024    HCT 27.8 (L) 2024    MCV 68 (L) 2024     (H) 2024     No results for input(s): "TSH", "FREET4" in the last 168 hours.  Lab Results   Component Value Date    HGBA1C 10.3 (H) 2024       Nutritional status:   Body mass index is 20.47 kg/m².  Lab Results   Component Value Date    ALBUMIN 2.6 (L) 2024    ALBUMIN 2.6 (L) 2024    ALBUMIN 2.5 (L) 2024     Lab Results   Component Value Date    PREALBUMIN 10 (L) 2024    PREALBUMIN 10 (L) 2024    PREALBUMIN 9 (L) 2024       Estimated Creatinine Clearance: 94.8 mL/min (based on SCr of 1.1 mg/dL).    Accu-Checks  Recent Labs     24  0627 24  1124 24  1632 24  1942 24  0705 24  1149 24  1153 24  1628 24  2018 " 09/05/24  0643   POCTGLUCOSE 185* 156* 223* 115* 89 >500* 370* 111* 149* 114*       Current Medications and/or Treatments Impacting Glycemic Control  Immunotherapy:    Immunosuppressants       None          Steroids:   Hormones (From admission, onward)      None          Pressors:    Autonomic Drugs (From admission, onward)      None          Hyperglycemia/Diabetes Medications:   Antihyperglycemics (From admission, onward)      Start     Stop Route Frequency Ordered    09/05/24 2100  insulin glargine U-100 (Lantus) pen 10 Units         -- SubQ 2 times daily 09/05/24 1005    09/02/24 1130  insulin aspart U-100 pen 10 Units         -- SubQ 3 times daily with meals 09/02/24 0841    08/25/24 1506  insulin aspart U-100 pen 0-10 Units         -- SubQ Before meals & nightly PRN 08/25/24 1407

## 2024-09-06 LAB
ALBUMIN SERPL BCP-MCNC: 2.6 G/DL (ref 3.5–5.2)
ALP SERPL-CCNC: 267 U/L (ref 55–135)
ALT SERPL W/O P-5'-P-CCNC: 5 U/L (ref 10–44)
ANION GAP SERPL CALC-SCNC: 11 MMOL/L (ref 8–16)
APTT PPP: 39.8 SEC (ref 21–32)
AST SERPL-CCNC: 38 U/L (ref 10–40)
BACTERIA BLD CULT: ABNORMAL
BASOPHILS # BLD AUTO: 0.05 K/UL (ref 0–0.2)
BASOPHILS NFR BLD: 0.5 % (ref 0–1.9)
BILIRUB DIRECT SERPL-MCNC: 1.3 MG/DL (ref 0.1–0.3)
BILIRUB SERPL-MCNC: 2 MG/DL (ref 0.1–1)
BNP SERPL-MCNC: 1022 PG/ML (ref 0–99)
BUN SERPL-MCNC: 20 MG/DL (ref 6–20)
CALCIUM SERPL-MCNC: 9.5 MG/DL (ref 8.7–10.5)
CHLORIDE SERPL-SCNC: 93 MMOL/L (ref 95–110)
CO2 SERPL-SCNC: 26 MMOL/L (ref 23–29)
CREAT SERPL-MCNC: 1.2 MG/DL (ref 0.5–1.4)
CRP SERPL-MCNC: 42.5 MG/L (ref 0–8.2)
DIFFERENTIAL METHOD BLD: ABNORMAL
EOSINOPHIL # BLD AUTO: 0 K/UL (ref 0–0.5)
EOSINOPHIL NFR BLD: 0.4 % (ref 0–8)
ERYTHROCYTE [DISTWIDTH] IN BLOOD BY AUTOMATED COUNT: 26.3 % (ref 11.5–14.5)
EST. GFR  (NO RACE VARIABLE): >60 ML/MIN/1.73 M^2
GLUCOSE SERPL-MCNC: 131 MG/DL (ref 70–110)
HCT VFR BLD AUTO: 27.8 % (ref 40–54)
HGB BLD-MCNC: 8 G/DL (ref 14–18)
IMM GRANULOCYTES # BLD AUTO: 0.04 K/UL (ref 0–0.04)
IMM GRANULOCYTES NFR BLD AUTO: 0.4 % (ref 0–0.5)
INR PPP: 3.1 (ref 0.8–1.2)
LDH SERPL L TO P-CCNC: 366 U/L (ref 110–260)
LYMPHOCYTES # BLD AUTO: 1.5 K/UL (ref 1–4.8)
LYMPHOCYTES NFR BLD: 14.9 % (ref 18–48)
MAGNESIUM SERPL-MCNC: 1.9 MG/DL (ref 1.6–2.6)
MCH RBC QN AUTO: 19.5 PG (ref 27–31)
MCHC RBC AUTO-ENTMCNC: 28.8 G/DL (ref 32–36)
MCV RBC AUTO: 68 FL (ref 82–98)
MONOCYTES # BLD AUTO: 1.2 K/UL (ref 0.3–1)
MONOCYTES NFR BLD: 11.9 % (ref 4–15)
NEUTROPHILS # BLD AUTO: 7 K/UL (ref 1.8–7.7)
NEUTROPHILS NFR BLD: 71.9 % (ref 38–73)
NRBC BLD-RTO: 0 /100 WBC
PHOSPHATE SERPL-MCNC: 4.2 MG/DL (ref 2.7–4.5)
PLATELET # BLD AUTO: 596 K/UL (ref 150–450)
PMV BLD AUTO: 9 FL (ref 9.2–12.9)
POCT GLUCOSE: 137 MG/DL (ref 70–110)
POCT GLUCOSE: 169 MG/DL (ref 70–110)
POCT GLUCOSE: 193 MG/DL (ref 70–110)
POCT GLUCOSE: 220 MG/DL (ref 70–110)
POTASSIUM SERPL-SCNC: 4.6 MMOL/L (ref 3.5–5.1)
PREALB SERPL-MCNC: 10 MG/DL (ref 20–43)
PROT SERPL-MCNC: 9 G/DL (ref 6–8.4)
PROTHROMBIN TIME: 31.2 SEC (ref 9–12.5)
RBC # BLD AUTO: 4.11 M/UL (ref 4.6–6.2)
SODIUM SERPL-SCNC: 130 MMOL/L (ref 136–145)
WBC # BLD AUTO: 9.78 K/UL (ref 3.9–12.7)

## 2024-09-06 PROCEDURE — 63600175 PHARM REV CODE 636 W HCPCS: Performed by: PHYSICIAN ASSISTANT

## 2024-09-06 PROCEDURE — 25000003 PHARM REV CODE 250: Performed by: STUDENT IN AN ORGANIZED HEALTH CARE EDUCATION/TRAINING PROGRAM

## 2024-09-06 PROCEDURE — 83615 LACTATE (LD) (LDH) ENZYME: CPT | Performed by: STUDENT IN AN ORGANIZED HEALTH CARE EDUCATION/TRAINING PROGRAM

## 2024-09-06 PROCEDURE — 20600001 HC STEP DOWN PRIVATE ROOM

## 2024-09-06 PROCEDURE — 25000003 PHARM REV CODE 250: Performed by: INTERNAL MEDICINE

## 2024-09-06 PROCEDURE — 63600175 PHARM REV CODE 636 W HCPCS: Performed by: NURSE PRACTITIONER

## 2024-09-06 PROCEDURE — 93750 INTERROGATION VAD IN PERSON: CPT | Mod: ,,, | Performed by: INTERNAL MEDICINE

## 2024-09-06 PROCEDURE — 84100 ASSAY OF PHOSPHORUS: CPT | Performed by: STUDENT IN AN ORGANIZED HEALTH CARE EDUCATION/TRAINING PROGRAM

## 2024-09-06 PROCEDURE — 85025 COMPLETE CBC W/AUTO DIFF WBC: CPT | Performed by: STUDENT IN AN ORGANIZED HEALTH CARE EDUCATION/TRAINING PROGRAM

## 2024-09-06 PROCEDURE — 85730 THROMBOPLASTIN TIME PARTIAL: CPT | Performed by: STUDENT IN AN ORGANIZED HEALTH CARE EDUCATION/TRAINING PROGRAM

## 2024-09-06 PROCEDURE — 83735 ASSAY OF MAGNESIUM: CPT | Performed by: STUDENT IN AN ORGANIZED HEALTH CARE EDUCATION/TRAINING PROGRAM

## 2024-09-06 PROCEDURE — 25000003 PHARM REV CODE 250

## 2024-09-06 PROCEDURE — 25000003 PHARM REV CODE 250: Performed by: PHYSICIAN ASSISTANT

## 2024-09-06 PROCEDURE — 99233 SBSQ HOSP IP/OBS HIGH 50: CPT | Mod: 95,,, | Performed by: NURSE PRACTITIONER

## 2024-09-06 PROCEDURE — 86140 C-REACTIVE PROTEIN: CPT | Performed by: STUDENT IN AN ORGANIZED HEALTH CARE EDUCATION/TRAINING PROGRAM

## 2024-09-06 PROCEDURE — 63600175 PHARM REV CODE 636 W HCPCS: Performed by: INTERNAL MEDICINE

## 2024-09-06 PROCEDURE — 99233 SBSQ HOSP IP/OBS HIGH 50: CPT | Mod: ,,, | Performed by: INTERNAL MEDICINE

## 2024-09-06 PROCEDURE — 80048 BASIC METABOLIC PNL TOTAL CA: CPT | Performed by: STUDENT IN AN ORGANIZED HEALTH CARE EDUCATION/TRAINING PROGRAM

## 2024-09-06 PROCEDURE — 84134 ASSAY OF PREALBUMIN: CPT | Performed by: STUDENT IN AN ORGANIZED HEALTH CARE EDUCATION/TRAINING PROGRAM

## 2024-09-06 PROCEDURE — 27000248 HC VAD-ADDITIONAL DAY

## 2024-09-06 PROCEDURE — 85610 PROTHROMBIN TIME: CPT | Performed by: STUDENT IN AN ORGANIZED HEALTH CARE EDUCATION/TRAINING PROGRAM

## 2024-09-06 PROCEDURE — 83880 ASSAY OF NATRIURETIC PEPTIDE: CPT | Performed by: STUDENT IN AN ORGANIZED HEALTH CARE EDUCATION/TRAINING PROGRAM

## 2024-09-06 PROCEDURE — 63600175 PHARM REV CODE 636 W HCPCS: Performed by: STUDENT IN AN ORGANIZED HEALTH CARE EDUCATION/TRAINING PROGRAM

## 2024-09-06 PROCEDURE — 80076 HEPATIC FUNCTION PANEL: CPT | Performed by: STUDENT IN AN ORGANIZED HEALTH CARE EDUCATION/TRAINING PROGRAM

## 2024-09-06 PROCEDURE — 99232 SBSQ HOSP IP/OBS MODERATE 35: CPT | Mod: ,,,

## 2024-09-06 RX ORDER — FLUCONAZOLE 2 MG/ML
400 INJECTION, SOLUTION INTRAVENOUS DAILY
Status: DISCONTINUED | OUTPATIENT
Start: 2024-09-06 | End: 2024-09-10 | Stop reason: HOSPADM

## 2024-09-06 RX ORDER — WARFARIN 1 MG/1
1 TABLET ORAL DAILY
Status: DISCONTINUED | OUTPATIENT
Start: 2024-09-06 | End: 2024-09-10 | Stop reason: HOSPADM

## 2024-09-06 RX ADMIN — INSULIN GLARGINE 10 UNITS: 100 INJECTION, SOLUTION SUBCUTANEOUS at 08:09

## 2024-09-06 RX ADMIN — GABAPENTIN 400 MG: 400 CAPSULE ORAL at 08:09

## 2024-09-06 RX ADMIN — LEVETIRACETAM 1500 MG: 500 TABLET, FILM COATED ORAL at 09:09

## 2024-09-06 RX ADMIN — INSULIN ASPART 2 UNITS: 100 INJECTION, SOLUTION INTRAVENOUS; SUBCUTANEOUS at 11:09

## 2024-09-06 RX ADMIN — CYCLOBENZAPRINE HYDROCHLORIDE 5 MG: 5 TABLET, FILM COATED ORAL at 09:09

## 2024-09-06 RX ADMIN — INSULIN GLARGINE 10 UNITS: 100 INJECTION, SOLUTION SUBCUTANEOUS at 09:09

## 2024-09-06 RX ADMIN — CEFAZOLIN 2 G: 2 INJECTION, POWDER, FOR SOLUTION INTRAMUSCULAR; INTRAVENOUS at 02:09

## 2024-09-06 RX ADMIN — CYCLOBENZAPRINE HYDROCHLORIDE 5 MG: 5 TABLET, FILM COATED ORAL at 08:09

## 2024-09-06 RX ADMIN — OXYCODONE HYDROCHLORIDE 10 MG: 10 TABLET, FILM COATED, EXTENDED RELEASE ORAL at 08:09

## 2024-09-06 RX ADMIN — INSULIN ASPART 10 UNITS: 100 INJECTION, SOLUTION INTRAVENOUS; SUBCUTANEOUS at 07:09

## 2024-09-06 RX ADMIN — AMIODARONE HYDROCHLORIDE 400 MG: 200 TABLET ORAL at 08:09

## 2024-09-06 RX ADMIN — CEFAZOLIN 2 G: 2 INJECTION, POWDER, FOR SOLUTION INTRAMUSCULAR; INTRAVENOUS at 05:09

## 2024-09-06 RX ADMIN — EPLERENONE 25 MG: 25 TABLET, FILM COATED ORAL at 09:09

## 2024-09-06 RX ADMIN — CEFAZOLIN 2 G: 2 INJECTION, POWDER, FOR SOLUTION INTRAMUSCULAR; INTRAVENOUS at 08:09

## 2024-09-06 RX ADMIN — WARFARIN SODIUM 1 MG: 1 TABLET ORAL at 04:09

## 2024-09-06 RX ADMIN — ACETAMINOPHEN 650 MG: 325 TABLET ORAL at 04:09

## 2024-09-06 RX ADMIN — FUROSEMIDE 10 MG/HR: 10 INJECTION, SOLUTION INTRAMUSCULAR; INTRAVENOUS at 10:09

## 2024-09-06 RX ADMIN — POTASSIUM CHLORIDE 40 MEQ: 1500 TABLET, EXTENDED RELEASE ORAL at 08:09

## 2024-09-06 RX ADMIN — INSULIN ASPART 10 UNITS: 100 INJECTION, SOLUTION INTRAVENOUS; SUBCUTANEOUS at 04:09

## 2024-09-06 RX ADMIN — PANTOPRAZOLE SODIUM 40 MG: 40 TABLET, DELAYED RELEASE ORAL at 09:09

## 2024-09-06 RX ADMIN — AMIODARONE HYDROCHLORIDE 400 MG: 200 TABLET ORAL at 09:09

## 2024-09-06 RX ADMIN — INSULIN ASPART 2 UNITS: 100 INJECTION, SOLUTION INTRAVENOUS; SUBCUTANEOUS at 04:09

## 2024-09-06 RX ADMIN — FLUCONAZOLE 400 MG: 2 INJECTION, SOLUTION INTRAVENOUS at 12:09

## 2024-09-06 RX ADMIN — INSULIN ASPART 2 UNITS: 100 INJECTION, SOLUTION INTRAVENOUS; SUBCUTANEOUS at 08:09

## 2024-09-06 RX ADMIN — DULOXETINE HYDROCHLORIDE 30 MG: 30 CAPSULE, DELAYED RELEASE ORAL at 09:09

## 2024-09-06 RX ADMIN — GABAPENTIN 100 MG: 100 CAPSULE ORAL at 12:09

## 2024-09-06 RX ADMIN — INSULIN ASPART 10 UNITS: 100 INJECTION, SOLUTION INTRAVENOUS; SUBCUTANEOUS at 11:09

## 2024-09-06 RX ADMIN — ACETAMINOPHEN 650 MG: 325 TABLET ORAL at 12:09

## 2024-09-06 RX ADMIN — AMLODIPINE BESYLATE 5 MG: 5 TABLET ORAL at 09:09

## 2024-09-06 RX ADMIN — Medication 400 MG: at 09:09

## 2024-09-06 RX ADMIN — POTASSIUM CHLORIDE 40 MEQ: 1500 TABLET, EXTENDED RELEASE ORAL at 09:09

## 2024-09-06 RX ADMIN — POTASSIUM CHLORIDE 40 MEQ: 1500 TABLET, EXTENDED RELEASE ORAL at 03:09

## 2024-09-06 RX ADMIN — LEVETIRACETAM 1500 MG: 500 TABLET, FILM COATED ORAL at 08:09

## 2024-09-06 RX ADMIN — GABAPENTIN 100 MG: 100 CAPSULE ORAL at 09:09

## 2024-09-06 RX ADMIN — ASPIRIN 81 MG: 81 TABLET, COATED ORAL at 09:09

## 2024-09-06 RX ADMIN — AMLODIPINE BESYLATE 5 MG: 5 TABLET ORAL at 04:09

## 2024-09-06 RX ADMIN — ATORVASTATIN CALCIUM 40 MG: 20 TABLET, FILM COATED ORAL at 09:09

## 2024-09-06 NOTE — PROGRESS NOTES
09/05/24 2006 09/05/24 2110 09/05/24 2111   Vital Signs   BP (!) 94/0 116/84 (!) 100/0   MAP (mmHg)  --  97  --    BP Location Left arm Left arm Left arm   BP Method Doppler Automatic Doppler   Patient Position Lying Lying Lying      09/06/24 0048 09/06/24 0420 09/06/24 0422   Vital Signs   BP (!) 90/0 109/85 (!) 92/0   MAP (mmHg)  --  94  --    BP Location Left arm Left arm Left arm   BP Method Doppler Automatic Doppler   Patient Position Lying Lying Lying     Provider contacted at 2116 due to high doppler and bp (listed in 2110 and 2111). OTD of amlodipine 5mg ordered and administered. After readings at 0420 and 0422, provider contacted again. Provider advised RN to administer amlodipine 5mg ahead of schedule to help decrease bp. Patient remained asymptomatic with no complaints

## 2024-09-06 NOTE — ASSESSMENT & PLAN NOTE
MSSA DLESI  Bacteremia  Pyelnephritis/renal abscess  Fungemia    38 yo male with LVAD HM3 as DT, prior MSSA DLESI/bacteremia c/b empyema (on suppressive cefadroxil) and eroded ICD/pocket infection s/p removal, seizure admitted for back pain. Hospital course notable for blcx with micrococcus (suspect contaminant), MSSA bacteremia (suspect due to DLES), and c parapsilosis fungemia (suspect 2/2 to prior picc vs pyelo). DLES also with MSSA.  CT with contrast now with irregular nonenhancing renal lesion c/f pyelonephritis or potential abscess, R chest lesion c/f gynecomastia. Old PICC pulled 8/29. TTE without IE. S/p ophthalmology evaluation, no evidence of endophthalmitis. CT spine without evidence of OM/discitis.     BCx 9/1 still growing yeast. Concern persistent fungemia is due to antifungal resistance vs inadequate source control. Suspect lvad now seeded.     Recommendations:  -continue cefazolin 2g q8hr IV to target MSSA. Plan for 6 wks from neg bcx. EOT 10/9/24.  -continue fluconazole 400mg po daily  -monitor for QTc prolongation  -f/u BCx to assess for clearance of fungemia.  -will need line placement once cultures clear,

## 2024-09-06 NOTE — PLAN OF CARE
Problem: Adult Inpatient Plan of Care  Goal: Plan of Care Review  Outcome: Progressing  Goal: Patient-Specific Goal (Individualized)  Outcome: Progressing  Goal: Absence of Hospital-Acquired Illness or Injury  Outcome: Progressing  Goal: Optimal Comfort and Wellbeing  Outcome: Progressing  Goal: Readiness for Transition of Care  Outcome: Progressing     Problem: Diabetes Comorbidity  Goal: Blood Glucose Level Within Targeted Range  Outcome: Progressing     Problem: Infection  Goal: Absence of Infection Signs and Symptoms  Outcome: Progressing     Problem: Ventricular Assist Device  Goal: Optimal Adjustment to Device  Outcome: Progressing  Goal: Absence of Bleeding  Outcome: Progressing  Goal: Absence of Embolism Signs and Symptoms  Outcome: Progressing  Goal: Optimal Blood Flow  Outcome: Progressing  Goal: Absence of Infection Signs and Symptoms  Outcome: Progressing  Goal: Effective Right-Sided Heart Function  Outcome: Progressing     Problem: Ventricular Assist Device  Goal: Optimal Adjustment to Device  Outcome: Progressing  Goal: Absence of Bleeding  Outcome: Progressing  Goal: Absence of Embolism Signs and Symptoms  Outcome: Progressing  Goal: Optimal Blood Flow  Outcome: Progressing  Goal: Absence of Infection Signs and Symptoms  Outcome: Progressing  Goal: Effective Right-Sided Heart Function  Outcome: Progressing     Problem: Coping Ineffective  Goal: Effective Coping  Outcome: Progressing     Problem: Fall Injury Risk  Goal: Absence of Fall and Fall-Related Injury  Outcome: Progressing     Problem: Sepsis/Septic Shock  Goal: Optimal Coping  Outcome: Progressing  Goal: Absence of Bleeding  Outcome: Progressing  Goal: Blood Glucose Level Within Targeted Range  Outcome: Progressing  Goal: Absence of Infection Signs and Symptoms  Outcome: Progressing  Goal: Optimal Nutrition Intake  Outcome: Progressing

## 2024-09-06 NOTE — PROGRESS NOTES
[facility] Step[facility]  Infectious Disease  Progress Note    Patient Name: Kevan Queen  MRN: [id number]  Admission Date: 8/25/2024  Length of Stay: 12 days  Attending Physician: Natalya Villatoro MD  Primary Care Provider: Rosio Armendariz FNP    Isolation Status: No active isolations  Assessment/Plan:      Cardiac/Vascular  * LVAD (left ventricular assist device) present  MSSA DLESI  Bacteremia  Pyelnephritis/renal abscess  Fungemia    40 yo male with LVAD HM3 as DT, prior MSSA DLESI/bacteremia c/b empyema (on suppressive cefadroxil) and eroded ICD/pocket infection s/p removal, seizure admitted for back pain. Hospital course notable for blcx with micrococcus (suspect contaminant), MSSA bacteremia (suspect due to DLES), and c parapsilosis fungemia (suspect 2/2 to prior picc vs pyelo). DLES also with MSSA.  CT with contrast now with irregular nonenhancing renal lesion c/f pyelonephritis or potential abscess, R chest lesion c/f gynecomastia. Old PICC pulled 8/29. TTE without IE. S/p ophthalmology evaluation, no evidence of endophthalmitis. CT spine without evidence of OM/discitis.     BCx 9/1 still growing yeast. Concern persistent fungemia is due to antifungal resistance vs inadequate source control. Suspect lvad now seeded.     Recommendations:  -continue cefazolin 2g q8hr IV to target MSSA. Plan for 6 wks from neg bcx. EOT 10/9/24.  -continue fluconazole 400mg po daily  -monitor for QTc prolongation  -awaiting susceptibility results of yeast  -f/u BCx to assess for clearance of fungemia.  -will need line placement once cultures clear,                 Anticipated Disposition: tbd    Thank you for your consult. I will follow-up with patient. Please contact us if you have any additional questions.    Kavya Carrion MD  Infectious Disease  [facility] - [facility]    Subjective:     Principal Problem:LVAD (left ventricular assist device) present    HPI: 40 yo male with LVAD HM3 as DT,  prior MSSA DLESI/bacteremia c/b empyema (on suppressive cefadroxil) and eroded ICD/pocket infection s/p removal, seizure admitted for back pain. ID consulted for abx recs. Hospital course notable for US of chest with hypoechoic area of unclear significance. DLES cx in process. Pt reported adherence to cefadroxil - denied issues with it. Denied fevers, chills, abdominal complaints or worsening drainage from DLES. Pt reported that he ran out of his primacor and had some back pain soon after. Denied problems with PICC. Reported swelling under R breast - pt states he is unclear how long he's had the swelling.            Interval History: No acute events overnight. No new complaints.    Review of Systems   Constitutional:  Negative for chills and fever.   Eyes:  Negative for visual disturbance.   Genitourinary:  Negative for difficulty urinating and dysuria.   Musculoskeletal:  Positive for back pain.   All other systems reviewed and are negative.    Objective:     Vital Signs (Most Recent):  Temp: 97.9 °F (36.6 °C) (09/06/24 1137)  Pulse: 84 (09/06/24 1200)  Resp: 18 (09/06/24 1137)  BP: (!) 90/0 (09/06/24 1200)  SpO2: 96 % (09/06/24 1137) Vital Signs (24h Range):  Temp:  [97.6 °F (36.4 °C)-98.9 °F (37.2 °C)] 97.9 °F (36.6 °C)  Pulse:  [82-92] 84  Resp:  [14-22] 18  SpO2:  [91 %-100 %] 96 %  BP: ()/(0-85) 90/0     Weight:  (pt stated his back hurts to bad to stand.)  Body mass index is 20.47 kg/m².    Estimated Creatinine Clearance: 86.9 mL/min (based on SCr of 1.2 mg/dL).     Physical Exam  Constitutional:       General: He is not in acute distress.     Appearance: He is not ill-appearing or toxic-appearing.   Eyes:      General:         Right eye: No discharge.         Left eye: No discharge.   Cardiovascular:      Comments: LVAD hum present  Pulmonary:      Effort: Pulmonary effort is normal. No respiratory distress.   Abdominal:      General: There is no distension.      Palpations: Abdomen is soft.       Tenderness: There is no right CVA tenderness or left CVA tenderness.      Comments: LVAD dressed   Musculoskeletal:         General: Tenderness (lower back pain) present.   Skin:     General: Skin is warm and dry.   Neurological:      Mental Status: He is alert and oriented to person, place, and time.          Significant Labs: Blood Culture:   Recent Labs   Lab 08/27/24  1802 08/28/24  2226 08/30/24  1152 09/01/24  0939 09/04/24  0217   LABBLOO Gram stain aer bottle: Gram positive cocci in clusters resembling Staph  Results called to and read back by:Miguelina Thomas RN 08/28/2024  22:59  MICROCOCCUS SPECIES  Organism is a probable contaminant  *  STAPHYLOCOCCUS AUREUS  ID consult required at [facility].  * Gram stain aer bottle: yeast  Gram stain dudley bottle: yeast  Positive results previously called 08/30/2024  YEAST   Identification pending  For susceptibility see order #[id number]  ID consult required at [facility].  *  Gram stain aer bottle: yeast  Results called to and read back by:Lorri Riggs RN 08/30/2024  17:10  YEAST   Identification pending  For susceptibility see order #[id number]  ID consult required at [facility].  * Gram stain dudley bottle: yeast  Results called to and read back by: Rufino 09/01/2024  02:03  CANDIDA PARAPSILOSIS  ID consult required at [facility].  Susceptibility pending  *  No growth after 5 days. Gram stain dudley bottle: yeast  Results called to and read back by: Rufino 09/03/2024  06:15  CANDIDA PARAPSILOSIS  For susceptibility see order #[id number]  ID consult required at [facility].  *  Gram stain aer bottle: yeast  Gram stain dudley bottle: yeast  Results called to and read back by: Rufino 09/03/2024  01:45  YEAST   Identification pending  ID consult required at [facility].  For  susceptibility see order #[id number]  * No Growth to date  No Growth to date  No Growth to date     Wound Culture:   Recent Labs   Lab 05/20/24  1653 08/27/24  1446   LABAERO No growth STAPHYLOCOCCUS AUREUS  Rare  *     All pertinent labs within the past 24 hours have been reviewed.    Significant Imaging: I have reviewed all pertinent imaging results/findings within the past 24 hours.

## 2024-09-06 NOTE — PROGRESS NOTES
[facility]  Endocrinology  Progress Note    Admit Date: 8/25/2024     Reason for Consult: Management of T2DM, Hyperglycemia     Surgical Procedure and Date: LVAD 06/29/2022     Diabetes diagnosis year: 2022    Home Diabetes Medications:  Levemir 20 units twice daily and Novolog SSI (150/25) per patient    How often checking glucose at home?  Has not checked in a few weeks due to running out of strips    BG readings on regimen: COLLIN  Hypoglycemia on the regimen?  Yes; rarely experiences hypoglycemic symptoms such as blurry vision and vomiting. However, does not know his blood sugar level due to running out of supplies. He will self-correct with a snack.  Missed doses on regimen?  Yes, has not had insulin in a few weeks due to running out of glucometer supplies.    Diabetes Complications include:   Hyperglycemia    Complicating diabetes co morbidities:   History of MI, CHF, and CKD      HPI:   Patient is a 39 y.o. 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 He underwent RVAD removal and chest closure 6/30/2022.  He was weaned off  but restarted due to RVF. He was transitioned to milrinone (secondary to  shortage). History of unclear syncope vs seizures and followed by neuro and is on Keppra. On 11/15/23 underwent removal of eroded [employer] scICD--no Lifevest (due to LVAD) and no plan to replace ICD as not requiring therapy post LVAD with concern for reinfection. Reported back pain (primarily upper back pain) for the past 3-4 days. He reported that it started 2 days after stopping pirmacor and he felt it may be related. Reported some shortness of breath after walking long distances which has been ongoing for months now and has not changed lately. He has lost some weight over last few months since his  "but weight has been stable over the last month. Patient admitted after being out of [facility] for 1 week with ongoing back pain. Patient stated he has not taken his insulin in a few weeks due to running out of glucometer testing supplies. He previously had a William, but is not wearing one. BG >700 at [facility]. Endo consulted for BG management.      Interval HPI:   No acute events overnight. Patient in room 319/319 A. Blood glucose stable. BG at and above goal on current insulin regimen (SSI, prandial, and basal insulin ). Steroid use- None.    Renal function- Normal   Lab Results   Component Value Date    CREATININE 1.2 09/06/2024     Vasopressors-  None     Endocrine will continue to follow and manage insulin orders inpatient.     Diet diabetic Cardiac (Low Na/Chol); 2000 Calorie; Fluid - 1500mL; Standard Tray     Eating:   <25%  Nausea: No  Hypoglycemia and intervention: No  Fever: No  TPN and/or TF: No  If yes, type of TF/TPN and rate: N/A    /85 (BP Location: Left arm, Patient Position: Lying)   Pulse 84   Temp 98.3 °F (36.8 °C) (Oral)   Resp 18   Ht 6' 3" (1.905 m)   Wt 74.3 kg (163 lb 12.8 oz)   SpO2 100%   BMI 20.47 kg/m²     Labs Reviewed and Include    Recent Labs   Lab 09/06/24  0526   *   CALCIUM 9.5   ALBUMIN 2.6*   PROT 9.0*   *   K 4.6   CO2 26   CL 93*   BUN 20   CREATININE 1.2   ALKPHOS 267*   ALT 5*   AST 38   BILITOT 2.0*     Lab Results   Component Value Date    WBC 9.78 09/06/2024    HGB 8.0 (L) 09/06/2024    HCT 27.8 (L) 09/06/2024    MCV 68 (L) 09/06/2024     (H) 09/06/2024     No results for input(s): "TSH", "FREET4" in the last 168 hours.  Lab Results   Component Value Date    HGBA1C 10.3 (H) 08/25/2024       Nutritional status:   Body mass index is 20.47 kg/m².  Lab Results   Component Value Date    ALBUMIN 2.6 (L) 09/06/2024    ALBUMIN 2.6 (L) 09/04/2024    ALBUMIN 2.6 (L) 09/02/2024     Lab Results   Component Value Date    PREALBUMIN 10 (L) " 09/06/2024    PREALBUMIN 10 (L) 09/04/2024    PREALBUMIN 10 (L) 09/02/2024       Estimated Creatinine Clearance: 86.9 mL/min (based on SCr of 1.2 mg/dL).    Accu-Checks  Recent Labs     09/04/24  0705 09/04/24  1149 09/04/24  1153 09/04/24  1628 09/04/24 2018 09/05/24  0643 09/05/24  1058 09/05/24  1553 09/05/24 2009 09/06/24  0727   POCTGLUCOSE 89 >500* 370* 111* 149* 114* 116* 176* 178* 137*       Current Medications and/or Treatments Impacting Glycemic Control  Immunotherapy:    Immunosuppressants       None          Steroids:   Hormones (From admission, onward)      None          Pressors:    Autonomic Drugs (From admission, onward)      None          Hyperglycemia/Diabetes Medications:   Antihyperglycemics (From admission, onward)      Start     Stop Route Frequency Ordered    09/05/24 2100  insulin glargine U-100 (Lantus) pen 10 Units         -- SubQ 2 times daily 09/05/24 1005    09/02/24 1130  insulin aspart U-100 pen 10 Units         -- SubQ 3 times daily with meals 09/02/24 0841    08/25/24 1506  insulin aspart U-100 pen 0-10 Units         -- SubQ Before meals & nightly PRN 08/25/24 1407            ASSESSMENT and PLAN    Cardiac/Vascular  * LVAD (left ventricular assist device) present  Managed by primary team  Optimize blood glucose control        CHF (NYHA class IV, ACC/AHA stage D)  Managed by primary team  Optimize blood glucose control      Endocrine  Type 2 diabetes mellitus with hyperglycemia, with long-term current use of insulin  BG goal 140-180  Noncompliant T2DM. Cake noted at bedside. Advised patient to avoid high carb foods/drinks.    Plan:   - Continue Lantus (Insulin Glargine) 10 units BID   - Continue Novolog (Insulin Aspart) 10 units TIDWM. HOLD if BG < 100  - Continue moderate dose correction (150/25)  - BG checks AC/HS  - Hypoglycemia protocol in place    ** Please notify Endocrine for any change and/or advance in diet**  ** Please call Endocrine for any BG related issues  **    Discharge Planning:   TBD. Please notify endocrinology prior to discharge.  Patient requires glucometer and testing supplies.  Recommend he resumes his William for blood sugar monitoring.          Guera De Oliveira PA-C  Endocrinology  [facility] - [facility] Step[facility]

## 2024-09-06 NOTE — PROGRESS NOTES
[facility] - Cardiology Stepdown  Heart Transplant  Progress Note    Patient Name: Kevan Queen  MRN: [id number]  Admission Date: 8/25/2024  Hospital Length of Stay: 12 days  Attending Physician: Natalya Villatoro MD  Primary Care Provider: Rosio Armendariz FNP  Principal Problem:LVAD (left ventricular assist device) present    Subjective:   Interval History: No complaints this AM or events overnight. Repeat Bcx's showing persistent fungemia so micafungin switched to fluconazole. Diuresed well with IV lasix.    Continuous Infusions: none    furosemide (Lasix) 500 mg in 50 mL infusion (conc: 10 mg/mL)  10 mg/hr Intravenous Continuous 1 mL/hr at 09/05/24 1413 10 mg/hr at 09/05/24 1413       Scheduled Meds:   [START ON 9/9/2024] amiodarone  200 mg Oral Daily    amiodarone  400 mg Oral BID    amLODIPine  5 mg Oral Daily    aspirin  81 mg Oral Daily    atorvastatin  40 mg Oral Daily    ceFAZolin (Ancef) IV (PEDS and ADULTS)  2 g Intravenous Q8H    cyclobenzaprine  5 mg Oral BID    DULoxetine  30 mg Oral Daily    eplerenone  25 mg Oral Daily    fluconazole (DIFLUCAN) IV (PEDS and ADULTS)  400 mg Intravenous Daily    gabapentin  100 mg Oral BID    gabapentin  400 mg Oral QHS    insulin aspart U-100  10 Units Subcutaneous TIDWM    insulin glargine U-100  10 Units Subcutaneous BID    levETIRAcetam  1,500 mg Oral BID    LIDOcaine  2 patch Transdermal Q24H    magnesium oxide  400 mg Oral Daily    nicotine  1 patch Transdermal Daily    pantoprazole  40 mg Oral Daily    polyethylene glycol  17 g Oral Daily    potassium chloride  40 mEq Oral TID    warfarin  1 mg Oral Daily     PRN Meds:  Current Facility-Administered Medications:     acetaminophen, 650 mg, Oral, Q6H PRN    dextrose 10%, 12.5 g, Intravenous, PRN    dextrose 10%, 25 g, Intravenous, PRN    glucagon (human recombinant), 1 mg, Intramuscular, PRN    glucose, 16 g, Oral, PRN    glucose, 24 g, Oral, PRN    insulin aspart U-100, 0-10 Units, Subcutaneous, QID (AC +  HS) PRN    ondansetron, 4 mg, Oral, Q8H PRN    oxyCODONE, 10 mg, Oral, Daily PRN    Review of patient's allergies indicates:   Allergen Reactions    Torsemide Hives     Objective:     Vital Signs (Most Recent):  Temp: 98.3 °F (36.8 °C) (09/06/24 0800)  Pulse: 84 (09/06/24 1010)  Resp: 18 (09/06/24 0800)  BP: 109/85 (09/06/24 0818)  SpO2: 100 % (09/06/24 0800) Vital Signs (24h Range):  Temp:  [97.6 °F (36.4 °C)-98.9 °F (37.2 °C)] 98.3 °F (36.8 °C)  Pulse:  [82-91] 84  Resp:  [14-22] 18  SpO2:  [91 %-100 %] 100 %  BP: ()/(0-85) 109/85     No data found.      Body mass index is 20.47 kg/m².      Intake/Output Summary (Last 24 hours) at 9/6/2024 1013  Last data filed at 9/6/2024 0817  Gross per 24 hour   Intake 1320 ml   Output 3910 ml   Net -2590 ml          Physical Exam  Constitutional:       General: He is not in acute distress.     Appearance: He is normal weight.   HENT:      Head: Normocephalic and atraumatic.      Right Ear: External ear normal.      Left Ear: External ear normal.      Nose: Nose normal.      Mouth/Throat:      Pharynx: Oropharynx is clear.   Cardiovascular:      Comments: Normal VAD hum, JVD present but improving   Pulmonary:      Effort: Pulmonary effort is normal.   Abdominal:      General: Abdomen is flat. Bowel sounds are normal. There is no distension (Distended but soft.).      Palpations: Abdomen is soft. There is no mass.      Tenderness: There is no abdominal tenderness. There is right CVA tenderness.   Musculoskeletal:      Cervical back: Normal range of motion.      Right lower leg: No edema.      Left lower leg: No edema.      Comments: Cont b/l back pain- upper spine and b/l paraspinal/upper back muscular pain.    Skin:     General: Skin is warm.      Comments: Chest wall on the right side with gynecomastia, no overlying skin changes, no tenderness at the site however palpable mass versus nodule present.   Neurological:      Mental Status: He is alert. Mental status is at  baseline.            Significant Labs:  CBC:  Recent Labs   Lab 09/04/24 0217 09/05/24 0217 09/06/24 0525   WBC 12.17 9.34 9.78   RBC 4.02* 4.08* 4.11*   HGB 7.5* 7.8* 8.0*   HCT 26.8* 27.8* 27.8*   * 589* 596*   MCV 67* 68* 68*   MCH 18.7* 19.1* 19.5*   MCHC 28.0* 28.1* 28.8*     BNP:  Recent Labs   Lab 09/02/24 0205 09/04/24 0217 09/06/24 0525   BNP 1,053* 1,170* 1,022*     CMP:  Recent Labs   Lab 09/02/24 0205 09/03/24  0334 09/04/24 0217 09/04/24 0218 09/05/24 0217 09/06/24 0526   GLU 41*   < >  --  70 67* 131*   CALCIUM 9.1   < >  --  9.6 9.0 9.5   ALBUMIN 2.6*  --  2.6*  --   --  2.6*   PROT 8.5*  --  8.5*  --   --  9.0*   *   < >  --  129* 131* 130*   K 3.4*   < >  --  3.9 3.4* 4.6   CO2 26   < >  --  25 27 26   CL 93*   < >  --  92* 91* 93*   BUN 20   < >  --  18 14 20   CREATININE 1.2   < >  --  1.2 1.1 1.2   ALKPHOS 230*  --  237*  --   --  267*   ALT 8*  --  8*  --   --  5*   AST 31  --  30  --   --  38   BILITOT 1.9*  --  1.7*  --   --  2.0*    < > = values in this interval not displayed.      Coagulation:   Recent Labs   Lab 09/04/24 0218 09/05/24 0217 09/06/24 0526   INR 3.2* 3.4* 3.1*   APTT 41.3* 43.0* 39.8*     LDH:  Recent Labs   Lab 09/04/24  0218 09/05/24  0217 09/06/24  0526   * 295* 366*     Microbiology:  Microbiology Results (last 7 days)       Procedure Component Value Units Date/Time    Blood culture [[id number]] Collected: 09/04/24 0217    Order Status: Completed Specimen: Blood Updated: 09/06/24 0613     Blood Culture, Routine No Growth to date      No Growth to date      No Growth to date    Blood culture [[id number]]  (Abnormal) Collected: 08/30/24 1152    Order Status: Completed Specimen: Blood Updated: 09/05/24 1127     Blood Culture, Routine Gram stain dudley bottle: yeast      Results called to and read back by: Rufino 09/01/2024  02:03      CANDIDA PARAPSILOSIS  ID consult required at [facility] and [place] locations.  Susceptibility  pending      Narrative:      Lft AC    Blood culture [[id number]]  (Abnormal) Collected: 08/28/24 2226    Order Status: Completed Specimen: Blood Updated: 09/05/24 1126     Blood Culture, Routine Gram stain aer bottle: yeast      Gram stain dudley bottle: yeast      Positive results previously called 08/30/2024      YEAST   Identification pending  For susceptibility see order #[id number]  ID consult required at [facility].      Narrative:      Code 62069  Draw sample # 2 from separate site    Blood culture [[id number]]  (Abnormal) Collected: 08/28/24 2226    Order Status: Completed Specimen: Blood Updated: 09/05/24 1126     Blood Culture, Routine Gram stain aer bottle: yeast      Results called to and read back by:Lorri Riggs RN 08/30/2024  17:10      YEAST   Identification pending  For susceptibility see order #[id number]  ID consult required at [facility].      Narrative:      Code 25820  Draw sample # 2 from separate site    Blood culture [[id number]]  (Abnormal) Collected: 08/27/24 1751    Order Status: Completed Specimen: Blood Updated: 09/05/24 1126     Blood Culture, Routine Gram stain dudley bottle: Gram positive cocci in clusters resembling Staph      Results called to and read back by: Lorri Riggs RN  08/29/2024   13:50      Gram stain aer bottle: yeast      Results called to and read back by:Lorri Riggs RN 08/30/2024      STAPHYLOCOCCUS AUREUS  ID consult required at [facility].  For susceptibility see order #[id number]        YEAST   Identification pending  For susceptibility see order #[id number]  ID consult required at [facility].      Narrative:      Blood culture # 2 different location.    Blood culture [[id number]]  (Abnormal) Collected: 09/01/24 0939    Order Status: Completed Specimen: Blood Updated: 09/05/24 1102     Blood Culture, Routine Gram stain dudley bottle: yeast      Results  called to and read back by: Rufino 09/03/2024  06:15      CANDIDA PARAPSILOSIS  For susceptibility see order #[id number]  ID consult required at [facility].      Blood culture [[id number]]  (Abnormal) Collected: 09/01/24 0939    Order Status: Completed Specimen: Blood Updated: 09/05/24 1101     Blood Culture, Routine Gram stain aer bottle: yeast      Gram stain dudley bottle: yeast      Results called to and read back by: Rufino 09/03/2024  01:45      YEAST   Identification pending  ID consult required at [facility].  For susceptibility see order #[id number]      Blood culture [[id number]] Collected: 08/30/24 1152    Order Status: Completed Specimen: Blood Updated: 09/04/24 1412     Blood Culture, Routine No growth after 5 days.    Narrative:      Rt AC    Culture, Anaerobe [[id number]] Collected: 08/27/24 1446    Order Status: Completed Specimen: Skin from Abdomen Updated: 09/02/24 0936     Anaerobic Culture No anaerobes isolated    Narrative:      Driveline site    Blood culture [[id number]]  (Abnormal)  (Susceptibility) Collected: 08/27/24 1802    Order Status: Completed Specimen: Blood Updated: 08/31/24 1049     Blood Culture, Routine Gram stain aer bottle: Gram positive cocci in clusters resembling Staph      Results called to and read back by:Miguelina [facility] RN 08/28/2024  22:59      MICROCOCCUS SPECIES  Organism is a probable contaminant        STAPHYLOCOCCUS AUREUS  ID consult required at [facility].      Rapid Organism ID by PCR (from Blood culture) [[id number]] Collected: 08/28/24 2226    Order Status: Completed Updated: 08/30/24 1823     Enterococcus faecalis Not Detected     Enterococcus faecium Not Detected     Listeria monocytogenes Not Detected     Staphylococcus spp. Not Detected     Staphylococcus aureus Not Detected     Staphylococcus epidermidis Not Detected     Staphylococcus lugdunensis Not Detected      Streptococcus species Not Detected     Streptococcus agalactiae Not Detected     Streptococcus pneumoniae Not Detected     Streptococcus pyogenes Not Detected     Acinetobacter calcoaceticus/baumannii complex Not Detected     Bacteroides fragilis Not Detected     Enterobacterales Not Detected     Enterobacter cloacae complex Not Detected     Escherichia coli Not Detected     Klebsiella aerogenes Not Detected     Klebsiella oxytoca Not Detected     Klebsiella pneumoniae group Not Detected     Proteus Not Detected     Salmonella sp Not Detected     Serratia marcescens Not Detected     Haemophilus influenzae Not Detected     Neisseria meningtidis Not Detected     Pseudomonas aeruginosa Not Detected     Stenotrophomonas maltophilia Not Detected     Candida albicans Not Detected     Candida auris Not Detected     Candida glabrata Not Detected     Candida krusei Not Detected     Candida parapsilosis Not Detected     Candida tropicalis Not Detected     Cryptococcus neoformans/gattii Not Detected     CTX-M (ESBL ) Test Not Applicable     IMP (Carbapenem resistant) Test Not Applicable     KPC resistance gene (Carbapenem resistant) Test Not Applicable     mcr-1  Test Not Applicable     mec A/C  Test Not Applicable     mec A/C and MREJ (MRSA) gene Test Not Applicable     NDM (Carbapenem resistant) Test Not Applicable     OXA-48-like (Carbapenem resistant) Test Not Applicable     van A/B (VRE gene) Test Not Applicable     VIM (Carbapenem resistant) Test Not Applicable    Narrative:      Code 62699  Draw sample # 2 from separate site    Culture, Anaerobe [[id number]] Collected: 08/25/24 4751    Order Status: Completed Specimen: Wound from Abdomen Updated: 08/30/24 1405     Anaerobic Culture No anaerobes isolated    Narrative:      Drive line exit site            I have reviewed all pertinent labs within the past 24 hours.    Estimated Creatinine Clearance: 86.9 mL/min (based on SCr of 1.2 mg/dL).    Diagnostic  Results:  I have reviewed and interpreted all pertinent imaging results/findings within the past 24 hours.  Assessment and Plan:     Mr. Kevan Thacker is a 39 year old male with stage D 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 He underwent RVAD removal and chest closure 6/30/2022.  He was weaned off  but he had to restarted due to RVF. He was eventually transitioned to milrinone (secondary to  shortage) now supposed to be on 0.25 mcg/kg/min. He has a history of unclear syncope vs seizures and is followed by neuro for this and is on Keppra.  He is being admitted after being out of [facility] for 1 week with ongoing back pain.      On 11/15/23 underwent removal of eroded [employer] scICD--no Lifevest (due to LVAD) and no plan to replace ICD as not requiring therapy post LVAD with concern for reinfection.  He has not had neurology f/u with seizure hx on keppra so coordinator to assist him with obtaining appt.      Reports back pain (primarily upper back pain) for the past 3-4 days. He reports that it started 2 days after stopping pirmacor and he feels it may be related. He has also been sleeping in the couch and identifies that it may also be related to the back pain. He denies lifting anything heavy or any falls /trauma. No red flags including incontinence of stool or urine. No numbness in the groin area or weakness in the legs or upper extremity reported. He reports he was not able to go for appointment because of transport issue that has now been resolved. He denies orthopnea, PND, weight gain, leg swelling. He says that he does have some shortness of breath after walking long distances which has been ongoing for months now and has not changed lately. He has lost some  weight over last few months since his but weight has been stable over the last month. He denied headache, constipation, diarrhea, SOB, cough, palpitations or any additional symptoms.     * LVAD (left ventricular assist device) present  -HeartMate 3 Implanted  6/29/2022  as DT  -HTS Primary  -cont coumadin, Goal INR 2.0-3.0. Supratherapeutic this AM. Letting INR come down some but have resumed coumadin  Lab Results   Component Value Date    INR 3.1 (H) 09/06/2024    INR 3.4 (H) 09/05/2024    INR 3.2 (H) 09/04/2024       -Antiplatelets ASA 81 mg  -LDH is stable overall today. Will continue to monitor daily.  -Speed set at 5100, LSL 4700 rpm  -Interrogation notable for  power cable disconnected, low voltage advisory, PI events  -Not listed for OHTx- advised pt he would not be candidate for OHTx (noncompliance/poor f/up)   -UDS negative  -attempted to call partner Robyn, unfortunately goes to        Procedure: Device Interrogation Including analysis of device parameters  Current Settings: Ventricular Assist Device  Review of device function is stable/unstable stable        9/6/2024     8:09 AM 9/6/2024     4:22 AM 9/6/2024    12:50 AM 9/5/2024     7:08 PM 9/5/2024     3:47 PM 9/5/2024    11:38 AM 9/5/2024     8:22 AM   TXP LVAD INTERROGATIONS   Type HeartMate3 HeartMate3 HeartMate3 HeartMate3 HeartMate3 HeartMate3 HeartMate3   Flow 3.6 4 3.8 4.2 3.9 4    Speed 5100 5100 5100 5100 5100 5100 5100   PI 6.8 6.2 6.6 5.7 6.4 5.7 3.7   Power (Serna) 3.6 3.5 3.5 3.6 3.6 3.6 3.6   LSL 4700 4700 4700 4700 4700 4700 4700   Pulsatility Intermittent pulse Intermittent pulse Intermittent pulse Intermittent pulse Intermittent pulse Intermittent pulse Intermittent pulse         Pulmonary nodules  Multiple pulmonary nodules seen on CT scan with contrast. Pt does have hx tobacco use. Lymph node enlargement noted as well. Pt has weight loss but not drastic  -need f/up scan in 3-6 months to assess stability of nodules      Pyelonephritis  Seen on ct with contrast. ID following.   -cont abx per ID   -no need for percutaneous drain per IR at this time unless pt decompensates  -current bacteremia/yeast not from renal source     Severe sepsis  -CT chest/abdomen and pelvis w/ contrast w/ pyelonephritis and renal abscess   -repeat CT spine with contrast +visualization of kidney read is stable, without worsening pyelo or abscess, no evidence of discitis    Plan:  -F/up cultures- +ve for staph, neg MRSA pcr, yeast now growing in blood as well  -Follow repeat cultures. As of 9/1 still fungemic, susceptibilities pending  -ID following-c/w cefazolin, fluconazole  -C/s to Optho for eval of endopthalmitis in setting of yeast in blood   -Removed PICC on 8/28  -Driveline infection vs pyelo vs PICC related  - TTE negative for endocarditis      Atrial flutter  pt previously went into atach vs aflutter. Pt was started on amiodarone, with rhythm aborted to NSR following bolus. BP remained stable.   -BMP, replace lytes as needed   -cont amiodarone- will complete load and keep on maintenance dosing thereafter  -pt does report intermittently at home having episodes of palpitations that may also represent paroxysmal afib vs flutter at home. Already on a/c. Chadvasc of 5          Polysubstance abuse  Pt has history of polysubstance use.   -UDS negative  -prn oxycodone for pyelonephritis pain x1 per day max given hx substance use       Subcutaneous nodule of breast  Pt with unilateral R sided gynecomastia with hard nodular feeling beneath soft subcutaneous tissue of breast. No notable skin dimpling or rash.   CT with contrast reporting gyneocomastia without fluid collection/abscess. Pulmonary nodules seen on exam (needs f/up scan in 3-6 mos), enlarged lymph nodes noted on scan as well. D/w pt need for continued f/up outpatient for cancer screening. Appears to be more reactive than malignant on most recent scan.  -transition from spironolactone to  epleronone     Supratherapeutic INR  -Goal INR 2-3 , INR 4.7 on admission - improving  - pharmacy  to manage coumadin dosing, cont warfarin   Check INR daily     Lab Results   Component Value Date    INR 3.2 (H) 09/04/2024    INR 3.3 (H) 09/03/2024    INR 2.8 (H) 09/02/2024         Bilateral back pain  Pt reports initial pain following running out of primacor. S/p spinal Xrays without significant abnormality besides degenerative disc disease.  NO red flag symptoms on admission  CT spine cervical, thoracic and lumbar negative for discitis/infectious concern and renal collection/pyelo appears to be unchanged.     Plan:  - Multimodal pain control  - titrate to patient's response  - C/w home gabapentin, tylenol prn and lidocaine patches.   - C/w Prn oxycodone added  - has not been using   - Schedule muscle relaxant overnight and assess for change in symptoms.   - Back pain more pronounced on R side, most likely cause is his pyelonephritis  -c/w abx for pyelonephritis        Hypokalemia  Replace as needed and monitor      Type 2 diabetes mellitus with hyperglycemia, with long-term current use of insulin  A1C 10.3  Endocrinology consulted for glucose management, largely uncontrolled       Lab Results   Component Value Date    HGBA1C 10.3 (H) 08/25/2024    HGBA1C >14.0 (H) 04/26/2024    HGBA1C >14.0 (H) 02/17/2024    [id number] 7.5 06/12/2023    [id number] 8.5 02/07/2023           Moderate malnutrition  -pt with aggressive care goals  -c/s nutrition, appreciate assistance     Seizure-like activity  History   C/w home keppra 1.5 gm BID    CHF (NYHA class IV, ACC/AHA stage D)    Updated 8/26/24:  EF 5-10% global hypokinesis, RV enlargement and global hypokinesis, biatrial enlargement, mild-to-moderate MR, severe TR, no van HeartMate 3 in place, aortic valve does not open, LVEDd 7.2  Previous TTE 9/5/23 with EF 10-15%, mod to sever TR, LVEDd 7.11, LVAD - HM3 in place    -Appears hypervolemic today so will diurese with IV lasix  while undergoing inpatient treatment for bacteremia and fungemia   -GDMT: d/c spironolactone given possible gynecomastia, transition to epleronone   -Previously on home Primacor 0.25 at home but ran out approx 5 days prior to admission and also reports he occasionally disconnects himself from his pump to perform certain activities. Will plan to d/c off primacor   - TTE With LVEF 5-10%, poor RV function, dilation, no signs of endocarditis on TTE   -palliative care c/s and pt discussed with service, appreciate f/up- plan for continued follow up with palliative outpatient as well with Dr. Carr   -repeated [facility] discussion on 8/31- full code, aggressive care, pt educated about compliance going forward - re-iterated. Pt understanding, apologetic for previous noncompliance. Pt understands he is not a candidate for heart transplant and will not be evaluated due to his noncompliance.           Fausto Reyes, NP  Heart Transplant  [facility] - Cardiology Stepdown

## 2024-09-06 NOTE — PROGRESS NOTES
09/06/2024  Mirela Chris    Current provider:  Natalya Villatoro MD    Device interrogation:      9/6/2024     8:09 AM 9/6/2024     4:22 AM 9/6/2024    12:50 AM 9/5/2024     7:08 PM 9/5/2024     3:47 PM 9/5/2024    11:38 AM 9/5/2024     8:22 AM   TXP LVAD INTERROGATIONS   Type HeartMate3 HeartMate3 HeartMate3 HeartMate3 HeartMate3 HeartMate3 HeartMate3   Flow 3.6 4 3.8 4.2 3.9 4    Speed 5100 5100 5100 5100 5100 5100 5100   PI 6.8 6.2 6.6 5.7 6.4 5.7 3.7   Power (Serna) 3.6 3.5 3.5 3.6 3.6 3.6 3.6   LSL 4700 4700 4700 4700 4700 4700 4700   Pulsatility Intermittent pulse Intermittent pulse Intermittent pulse Intermittent pulse Intermittent pulse Intermittent pulse Intermittent pulse          Rounded on Kevan Queen to ensure all mechanical assist device settings (IABP or VAD) were appropriate and all parameters were within limits.  I was able to ensure all back up equipment was present, the staff had no issues, and the Perfusion Department daily rounding was complete.      For implantable VADs: Interrogation of Ventricular assist device was performed with analysis of device parameters and review of device function. I have personally reviewed the interrogation findings and agree with findings as stated.     In emergency, the nursing units have been notified to contact the perfusion department either by:  Calling [phone] from 630am to 4pm Mon thru Fri, utilizing the On-Call Finder functionality of Epic and searching for Perfusion, or by contacting the hospital  from 4pm to 630am and on weekends and asking to speak with the perfusionist on call.    8:18 AM

## 2024-09-06 NOTE — ASSESSMENT & PLAN NOTE
-HeartMate 3 Implanted  6/29/2022  as DT  -HTS Primary  -cont coumadin, Goal INR 2.0-3.0. Supratherapeutic this AM. Letting INR come down some but have resumed coumadin  Lab Results   Component Value Date    INR 3.1 (H) 09/06/2024    INR 3.4 (H) 09/05/2024    INR 3.2 (H) 09/04/2024       -Antiplatelets ASA 81 mg  -LDH is stable overall today. Will continue to monitor daily.  -Speed set at 5100, LSL 4700 rpm  -Interrogation notable for  power cable disconnected, low voltage advisory, PI events  -Not listed for OHTx- advised pt he would not be candidate for OHTx (noncompliance/poor f/up)   -UDS negative  -attempted to call partner Robyn, unfortunately goes to        Procedure: Device Interrogation Including analysis of device parameters  Current Settings: Ventricular Assist Device  Review of device function is stable/unstable stable        9/6/2024     8:09 AM 9/6/2024     4:22 AM 9/6/2024    12:50 AM 9/5/2024     7:08 PM 9/5/2024     3:47 PM 9/5/2024    11:38 AM 9/5/2024     8:22 AM   TXP LVAD INTERROGATIONS   Type HeartMate3 HeartMate3 HeartMate3 HeartMate3 HeartMate3 HeartMate3 HeartMate3   Flow 3.6 4 3.8 4.2 3.9 4    Speed 5100 5100 5100 5100 5100 5100 5100   PI 6.8 6.2 6.6 5.7 6.4 5.7 3.7   Power (Serna) 3.6 3.5 3.5 3.6 3.6 3.6 3.6   LSL 4700 4700 4700 4700 4700 4700 4700   Pulsatility Intermittent pulse Intermittent pulse Intermittent pulse Intermittent pulse Intermittent pulse Intermittent pulse Intermittent pulse

## 2024-09-06 NOTE — SUBJECTIVE & OBJECTIVE
"Interval HPI:   No acute events overnight. Patient in room 319/319 A. Blood glucose stable. BG at and above goal on current insulin regimen (SSI, prandial, and basal insulin ). Steroid use- None.    Renal function- Normal   Lab Results   Component Value Date    CREATININE 1.2 09/06/2024     Vasopressors-  None     Endocrine will continue to follow and manage insulin orders inpatient.     Diet diabetic Cardiac (Low Na/Chol); 2000 Calorie; Fluid - 1500mL; Standard Tray     Eating:   <25%  Nausea: No  Hypoglycemia and intervention: No  Fever: No  TPN and/or TF: No  If yes, type of TF/TPN and rate: N/A    /85 (BP Location: Left arm, Patient Position: Lying)   Pulse 84   Temp 98.3 °F (36.8 °C) (Oral)   Resp 18   Ht 6' 3" (1.905 m)   Wt 74.3 kg (163 lb 12.8 oz)   SpO2 100%   BMI 20.47 kg/m²     Labs Reviewed and Include    Recent Labs   Lab 09/06/24  0526   *   CALCIUM 9.5   ALBUMIN 2.6*   PROT 9.0*   *   K 4.6   CO2 26   CL 93*   BUN 20   CREATININE 1.2   ALKPHOS 267*   ALT 5*   AST 38   BILITOT 2.0*     Lab Results   Component Value Date    WBC 9.78 09/06/2024    HGB 8.0 (L) 09/06/2024    HCT 27.8 (L) 09/06/2024    MCV 68 (L) 09/06/2024     (H) 09/06/2024     No results for input(s): "TSH", "FREET4" in the last 168 hours.  Lab Results   Component Value Date    HGBA1C 10.3 (H) 08/25/2024       Nutritional status:   Body mass index is 20.47 kg/m².  Lab Results   Component Value Date    ALBUMIN 2.6 (L) 09/06/2024    ALBUMIN 2.6 (L) 09/04/2024    ALBUMIN 2.6 (L) 09/02/2024     Lab Results   Component Value Date    PREALBUMIN 10 (L) 09/06/2024    PREALBUMIN 10 (L) 09/04/2024    PREALBUMIN 10 (L) 09/02/2024       Estimated Creatinine Clearance: 86.9 mL/min (based on SCr of 1.2 mg/dL).    Accu-Checks  Recent Labs     09/04/24  0705 09/04/24  1149 09/04/24  1153 09/04/24  1628 09/04/24 2018 09/05/24  0643 09/05/24  1058 09/05/24  1553 09/05/24 2009 09/06/24  0727   POCTGLUCOSE 89 >500* 370* " 111* 149* 114* 116* 176* 178* 137*       Current Medications and/or Treatments Impacting Glycemic Control  Immunotherapy:    Immunosuppressants       None          Steroids:   Hormones (From admission, onward)      None          Pressors:    Autonomic Drugs (From admission, onward)      None          Hyperglycemia/Diabetes Medications:   Antihyperglycemics (From admission, onward)      Start     Stop Route Frequency Ordered    09/05/24 2100  insulin glargine U-100 (Lantus) pen 10 Units         -- SubQ 2 times daily 09/05/24 1005    09/02/24 1130  insulin aspart U-100 pen 10 Units         -- SubQ 3 times daily with meals 09/02/24 0841    08/25/24 1506  insulin aspart U-100 pen 0-10 Units         -- SubQ Before meals & nightly PRN 08/25/24 1407

## 2024-09-06 NOTE — SUBJECTIVE & OBJECTIVE
Interval History: No complaints this AM or events overnight. Repeat Bcx's showing persistent fungemia so micafungin switched to fluconazole. Diuresed well with IV lasix.    Continuous Infusions: none    furosemide (Lasix) 500 mg in 50 mL infusion (conc: 10 mg/mL)  10 mg/hr Intravenous Continuous 1 mL/hr at 09/05/24 1413 10 mg/hr at 09/05/24 1413       Scheduled Meds:   [START ON 9/9/2024] amiodarone  200 mg Oral Daily    amiodarone  400 mg Oral BID    amLODIPine  5 mg Oral Daily    aspirin  81 mg Oral Daily    atorvastatin  40 mg Oral Daily    ceFAZolin (Ancef) IV (PEDS and ADULTS)  2 g Intravenous Q8H    cyclobenzaprine  5 mg Oral BID    DULoxetine  30 mg Oral Daily    eplerenone  25 mg Oral Daily    fluconazole (DIFLUCAN) IV (PEDS and ADULTS)  400 mg Intravenous Daily    gabapentin  100 mg Oral BID    gabapentin  400 mg Oral QHS    insulin aspart U-100  10 Units Subcutaneous TIDWM    insulin glargine U-100  10 Units Subcutaneous BID    levETIRAcetam  1,500 mg Oral BID    LIDOcaine  2 patch Transdermal Q24H    magnesium oxide  400 mg Oral Daily    nicotine  1 patch Transdermal Daily    pantoprazole  40 mg Oral Daily    polyethylene glycol  17 g Oral Daily    potassium chloride  40 mEq Oral TID    warfarin  1 mg Oral Daily     PRN Meds:  Current Facility-Administered Medications:     acetaminophen, 650 mg, Oral, Q6H PRN    dextrose 10%, 12.5 g, Intravenous, PRN    dextrose 10%, 25 g, Intravenous, PRN    glucagon (human recombinant), 1 mg, Intramuscular, PRN    glucose, 16 g, Oral, PRN    glucose, 24 g, Oral, PRN    insulin aspart U-100, 0-10 Units, Subcutaneous, QID (AC + HS) PRN    ondansetron, 4 mg, Oral, Q8H PRN    oxyCODONE, 10 mg, Oral, Daily PRN    Review of patient's allergies indicates:   Allergen Reactions    Torsemide Hives     Objective:     Vital Signs (Most Recent):  Temp: 98.3 °F (36.8 °C) (09/06/24 0800)  Pulse: 84 (09/06/24 1010)  Resp: 18 (09/06/24 0800)  BP: 109/85 (09/06/24 0818)  SpO2: 100 %  (09/06/24 0800) Vital Signs (24h Range):  Temp:  [97.6 °F (36.4 °C)-98.9 °F (37.2 °C)] 98.3 °F (36.8 °C)  Pulse:  [82-91] 84  Resp:  [14-22] 18  SpO2:  [91 %-100 %] 100 %  BP: ()/(0-85) 109/85     No data found.      Body mass index is 20.47 kg/m².      Intake/Output Summary (Last 24 hours) at 9/6/2024 1013  Last data filed at 9/6/2024 0817  Gross per 24 hour   Intake 1320 ml   Output 3910 ml   Net -2590 ml          Physical Exam  Constitutional:       General: He is not in acute distress.     Appearance: He is normal weight.   HENT:      Head: Normocephalic and atraumatic.      Right Ear: External ear normal.      Left Ear: External ear normal.      Nose: Nose normal.      Mouth/Throat:      Pharynx: Oropharynx is clear.   Cardiovascular:      Comments: Normal VAD hum, JVD present but improving   Pulmonary:      Effort: Pulmonary effort is normal.   Abdominal:      General: Abdomen is flat. Bowel sounds are normal. There is no distension (Distended but soft.).      Palpations: Abdomen is soft. There is no mass.      Tenderness: There is no abdominal tenderness. There is right CVA tenderness.   Musculoskeletal:      Cervical back: Normal range of motion.      Right lower leg: No edema.      Left lower leg: No edema.      Comments: Cont b/l back pain- upper spine and b/l paraspinal/upper back muscular pain.    Skin:     General: Skin is warm.      Comments: Chest wall on the right side with gynecomastia, no overlying skin changes, no tenderness at the site however palpable mass versus nodule present.   Neurological:      Mental Status: He is alert. Mental status is at baseline.            Significant Labs:  CBC:  Recent Labs   Lab 09/04/24  0217 09/05/24  0217 09/06/24  0525   WBC 12.17 9.34 9.78   RBC 4.02* 4.08* 4.11*   HGB 7.5* 7.8* 8.0*   HCT 26.8* 27.8* 27.8*   * 589* 596*   MCV 67* 68* 68*   MCH 18.7* 19.1* 19.5*   MCHC 28.0* 28.1* 28.8*     BNP:  Recent Labs   Lab 09/02/24  0205 09/04/24  021  09/06/24  0525   BNP 1,053* 1,170* 1,022*     CMP:  Recent Labs   Lab 09/02/24  0205 09/03/24  0334 09/04/24 0217 09/04/24 0218 09/05/24 0217 09/06/24  0526   GLU 41*   < >  --  70 67* 131*   CALCIUM 9.1   < >  --  9.6 9.0 9.5   ALBUMIN 2.6*  --  2.6*  --   --  2.6*   PROT 8.5*  --  8.5*  --   --  9.0*   *   < >  --  129* 131* 130*   K 3.4*   < >  --  3.9 3.4* 4.6   CO2 26   < >  --  25 27 26   CL 93*   < >  --  92* 91* 93*   BUN 20   < >  --  18 14 20   CREATININE 1.2   < >  --  1.2 1.1 1.2   ALKPHOS 230*  --  237*  --   --  267*   ALT 8*  --  8*  --   --  5*   AST 31  --  30  --   --  38   BILITOT 1.9*  --  1.7*  --   --  2.0*    < > = values in this interval not displayed.      Coagulation:   Recent Labs   Lab 09/04/24 0218 09/05/24 0217 09/06/24  0526   INR 3.2* 3.4* 3.1*   APTT 41.3* 43.0* 39.8*     LDH:  Recent Labs   Lab 09/04/24 0218 09/05/24 0217 09/06/24  0526   * 295* 366*     Microbiology:  Microbiology Results (last 7 days)       Procedure Component Value Units Date/Time    Blood culture [[id number]] Collected: 09/04/24 0217    Order Status: Completed Specimen: Blood Updated: 09/06/24 0613     Blood Culture, Routine No Growth to date      No Growth to date      No Growth to date    Blood culture [[id number]]  (Abnormal) Collected: 08/30/24 1152    Order Status: Completed Specimen: Blood Updated: 09/05/24 1127     Blood Culture, Routine Gram stain dudley bottle: yeast      Results called to and read back by: Rufino 09/01/2024  02:03      CANDIDA PARAPSILOSIS  ID consult required at [facility].[place] and [place] locations.  Susceptibility pending      Narrative:      Lft AC    Blood culture [[id number]]  (Abnormal) Collected: 08/28/24 2226    Order Status: Completed Specimen: Blood Updated: 09/05/24 1126     Blood Culture, Routine Gram stain aer bottle: yeast      Gram stain dudley bottle: yeast      Positive results previously called 08/30/2024      YEAST   Identification pending  For  susceptibility see order #[id number]  ID consult required at [facility].      Narrative:      Code 93237  Draw sample # 2 from separate site    Blood culture [[id number]]  (Abnormal) Collected: 08/28/24 2226    Order Status: Completed Specimen: Blood Updated: 09/05/24 1126     Blood Culture, Routine Gram stain aer bottle: yeast      Results called to and read back by:Lorri Riggs RN 08/30/2024  17:10      YEAST   Identification pending  For susceptibility see order #[id number]  ID consult required at [facility].      Narrative:      Code 24495  Draw sample # 2 from separate site    Blood culture [[id number]]  (Abnormal) Collected: 08/27/24 1751    Order Status: Completed Specimen: Blood Updated: 09/05/24 1126     Blood Culture, Routine Gram stain dudley bottle: Gram positive cocci in clusters resembling Staph      Results called to and read back by: Lorri Riggs RN  08/29/2024   13:50      Gram stain aer bottle: yeast      Results called to and read back by:Lorri Riggs RN 08/30/2024      STAPHYLOCOCCUS AUREUS  ID consult required at [facility].  For susceptibility see order #[id number]        YEAST   Identification pending  For susceptibility see order #[id number]  ID consult required at [facility].      Narrative:      Blood culture # 2 different location.    Blood culture [[id number]]  (Abnormal) Collected: 09/01/24 0939    Order Status: Completed Specimen: Blood Updated: 09/05/24 1102     Blood Culture, Routine Gram stain dudley bottle: yeast      Results called to and read back by: Rufino 09/03/2024  06:15      CANDIDA PARAPSILOSIS  For susceptibility see order #[id number]  ID consult required at [facility].      Blood culture [[id number]]  (Abnormal) Collected: 09/01/24 0939    Order Status: Completed Specimen: Blood Updated: 09/05/24 1101     Blood Culture,  Routine Gram stain aer bottle: yeast      Gram stain dudley bottle: yeast      Results called to and read back by: Rufino 09/03/2024  01:45      YEAST   Identification pending  ID consult required at [facility].  For susceptibility see order #[id number]      Blood culture [[id number]] Collected: 08/30/24 1152    Order Status: Completed Specimen: Blood Updated: 09/04/24 1412     Blood Culture, Routine No growth after 5 days.    Narrative:      Rt AC    Culture, Anaerobe [[id number]] Collected: 08/27/24 1446    Order Status: Completed Specimen: Skin from Abdomen Updated: 09/02/24 0936     Anaerobic Culture No anaerobes isolated    Narrative:      Driveline site    Blood culture [[id number]]  (Abnormal)  (Susceptibility) Collected: 08/27/24 1802    Order Status: Completed Specimen: Blood Updated: 08/31/24 1049     Blood Culture, Routine Gram stain aer bottle: Gram positive cocci in clusters resembling Staph      Results called to and read back by:Miguelina Thomas RN 08/28/2024  22:59      MICROCOCCUS SPECIES  Organism is a probable contaminant        STAPHYLOCOCCUS AUREUS  ID consult required at [facility].      Rapid Organism ID by PCR (from Blood culture) [[id number]] Collected: 08/28/24 2226    Order Status: Completed Updated: 08/30/24 1823     Enterococcus faecalis Not Detected     Enterococcus faecium Not Detected     Listeria monocytogenes Not Detected     Staphylococcus spp. Not Detected     Staphylococcus aureus Not Detected     Staphylococcus epidermidis Not Detected     Staphylococcus lugdunensis Not Detected     Streptococcus species Not Detected     Streptococcus agalactiae Not Detected     Streptococcus pneumoniae Not Detected     Streptococcus pyogenes Not Detected     Acinetobacter calcoaceticus/baumannii complex Not Detected     Bacteroides fragilis Not Detected     Enterobacterales Not Detected     Enterobacter cloacae complex Not Detected      Escherichia coli Not Detected     Klebsiella aerogenes Not Detected     Klebsiella oxytoca Not Detected     Klebsiella pneumoniae group Not Detected     Proteus Not Detected     Salmonella sp Not Detected     Serratia marcescens Not Detected     Haemophilus influenzae Not Detected     Neisseria meningtidis Not Detected     Pseudomonas aeruginosa Not Detected     Stenotrophomonas maltophilia Not Detected     Candida albicans Not Detected     Candida auris Not Detected     Candida glabrata Not Detected     Candida krusei Not Detected     Candida parapsilosis Not Detected     Candida tropicalis Not Detected     Cryptococcus neoformans/gattii Not Detected     CTX-M (ESBL ) Test Not Applicable     IMP (Carbapenem resistant) Test Not Applicable     KPC resistance gene (Carbapenem resistant) Test Not Applicable     mcr-1  Test Not Applicable     mec A/C  Test Not Applicable     mec A/C and MREJ (MRSA) gene Test Not Applicable     NDM (Carbapenem resistant) Test Not Applicable     OXA-48-like (Carbapenem resistant) Test Not Applicable     van A/B (VRE gene) Test Not Applicable     VIM (Carbapenem resistant) Test Not Applicable    Narrative:      Code 18909  Draw sample # 2 from separate site    Culture, Anaerobe [[id number]] Collected: 08/25/24 0653    Order Status: Completed Specimen: Wound from Abdomen Updated: 08/30/24 1405     Anaerobic Culture No anaerobes isolated    Narrative:      Drive line exit site            I have reviewed all pertinent labs within the past 24 hours.    Estimated Creatinine Clearance: 86.9 mL/min (based on SCr of 1.2 mg/dL).    Diagnostic Results:  I have reviewed and interpreted all pertinent imaging results/findings within the past 24 hours.

## 2024-09-06 NOTE — ASSESSMENT & PLAN NOTE
BG goal 140-180  Noncompliant T2DM. Cake noted at bedside. Advised patient to avoid high carb foods/drinks.    Plan:   - Continue Lantus (Insulin Glargine) 10 units BID   - Continue Novolog (Insulin Aspart) 10 units TIDWM. HOLD if BG < 100  - Continue moderate dose correction (150/25)  - BG checks AC/HS  - Hypoglycemia protocol in place    ** Please notify Endocrine for any change and/or advance in diet**  ** Please call Endocrine for any BG related issues **    Discharge Planning:   TBD. Please notify endocrinology prior to discharge.  Patient requires glucometer and testing supplies.  Recommend he resumes his [place] for blood sugar monitoring.

## 2024-09-06 NOTE — PLAN OF CARE
Plan of care discussed with patient.  Patient ambulating independently, fall precautions in place. LVAD DP and numbers WNL, smooth LVAD hum. Patient had  complaints of back pain gave prn, tylenol. Discussed medications and care. Patient has no questions at this time. Will continue to follow care.       Problem: Adult Inpatient Plan of Care  Goal: Plan of Care Review  Outcome: Progressing  Goal: Patient-Specific Goal (Individualized)  Outcome: Progressing  Goal: Absence of Hospital-Acquired Illness or Injury  Outcome: Progressing  Goal: Optimal Comfort and Wellbeing  Outcome: Progressing  Goal: Readiness for Transition of Care  Outcome: Progressing     Problem: Diabetes Comorbidity  Goal: Blood Glucose Level Within Targeted Range  Outcome: Progressing

## 2024-09-06 NOTE — SUBJECTIVE & OBJECTIVE
Interval History: No acute events overnight. No new complaints.    Review of Systems   Constitutional:  Negative for chills and fever.   Eyes:  Negative for visual disturbance.   Genitourinary:  Negative for difficulty urinating and dysuria.   Musculoskeletal:  Positive for back pain.   All other systems reviewed and are negative.    Objective:     Vital Signs (Most Recent):  Temp: 97.9 °F (36.6 °C) (09/06/24 1137)  Pulse: 84 (09/06/24 1200)  Resp: 18 (09/06/24 1137)  BP: (!) 90/0 (09/06/24 1200)  SpO2: 96 % (09/06/24 1137) Vital Signs (24h Range):  Temp:  [97.6 °F (36.4 °C)-98.9 °F (37.2 °C)] 97.9 °F (36.6 °C)  Pulse:  [82-92] 84  Resp:  [14-22] 18  SpO2:  [91 %-100 %] 96 %  BP: ()/(0-85) 90/0     Weight:  (pt stated his back hurts to bad to stand.)  Body mass index is 20.47 kg/m².    Estimated Creatinine Clearance: 86.9 mL/min (based on SCr of 1.2 mg/dL).     Physical Exam  Constitutional:       General: He is not in acute distress.     Appearance: He is not ill-appearing or toxic-appearing.   Eyes:      General:         Right eye: No discharge.         Left eye: No discharge.   Cardiovascular:      Comments: LVAD hum present  Pulmonary:      Effort: Pulmonary effort is normal. No respiratory distress.   Abdominal:      General: There is no distension.      Palpations: Abdomen is soft.      Tenderness: There is no right CVA tenderness or left CVA tenderness.      Comments: LVAD dressed   Musculoskeletal:         General: Tenderness (lower back pain) present.   Skin:     General: Skin is warm and dry.   Neurological:      Mental Status: He is alert and oriented to person, place, and time.          Significant Labs: Blood Culture:   Recent Labs   Lab 08/27/24  1802 08/28/24  2226 08/30/24  1152 09/01/24  0939 09/04/24  0217   LABBLOO Gram stain aer bottle: Gram positive cocci in clusters resembling Staph  Results called to and read back by:Miguelina Thomas RN 08/28/2024  22:59  MICROCOCCUS SPECIES  Organism is  a probable contaminant  *  STAPHYLOCOCCUS AUREUS  ID consult required at [facility].  * Gram stain aer bottle: yeast  Gram stain dudley bottle: yeast  Positive results previously called 08/30/2024  YEAST   Identification pending  For susceptibility see order #[id number]  ID consult required at [facility].  *  Gram stain aer bottle: yeast  Results called to and read back by:Lorri Riggs RN 08/30/2024  17:10  YEAST   Identification pending  For susceptibility see order #[id number]  ID consult required at [facility].  * Gram stain dudley bottle: yeast  Results called to and read back by: Rufino 09/01/2024  02:03  CANDIDA PARAPSILOSIS  ID consult required at [facility].  Susceptibility pending  *  No growth after 5 days. Gram stain dudley bottle: yeast  Results called to and read back by: Rufino 09/03/2024  06:15  CANDIDA PARAPSILOSIS  For susceptibility see order #[id number]  ID consult required at [facility].  *  Gram stain aer bottle: yeast  Gram stain dudley bottle: yeast  Results called to and read back by: Rufino 09/03/2024  01:45  YEAST   Identification pending  ID consult required at [facility].  For susceptibility see order #[id number]  * No Growth to date  No Growth to date  No Growth to date     Wound Culture:   Recent Labs   Lab 05/20/24  1653 08/27/24  1446   LABAERO No growth STAPHYLOCOCCUS AUREUS  Rare  *     All pertinent labs within the past 24 hours have been reviewed.    Significant Imaging: I have reviewed all pertinent imaging results/findings within the past 24 hours.

## 2024-09-07 LAB
ANION GAP SERPL CALC-SCNC: 13 MMOL/L (ref 8–16)
APTT PPP: 40.4 SEC (ref 21–32)
BASOPHILS # BLD AUTO: 0.03 K/UL (ref 0–0.2)
BASOPHILS NFR BLD: 0.3 % (ref 0–1.9)
BUN SERPL-MCNC: 23 MG/DL (ref 6–20)
CALCIUM SERPL-MCNC: 9.8 MG/DL (ref 8.7–10.5)
CHLORIDE SERPL-SCNC: 96 MMOL/L (ref 95–110)
CO2 SERPL-SCNC: 22 MMOL/L (ref 23–29)
CREAT SERPL-MCNC: 1.2 MG/DL (ref 0.5–1.4)
DIFFERENTIAL METHOD BLD: ABNORMAL
EOSINOPHIL # BLD AUTO: 0.1 K/UL (ref 0–0.5)
EOSINOPHIL NFR BLD: 0.6 % (ref 0–8)
ERYTHROCYTE [DISTWIDTH] IN BLOOD BY AUTOMATED COUNT: 26.8 % (ref 11.5–14.5)
EST. GFR  (NO RACE VARIABLE): >60 ML/MIN/1.73 M^2
GLUCOSE SERPL-MCNC: 171 MG/DL (ref 70–110)
HCT VFR BLD AUTO: 27.8 % (ref 40–54)
HGB BLD-MCNC: 7.8 G/DL (ref 14–18)
IMM GRANULOCYTES # BLD AUTO: 0.02 K/UL (ref 0–0.04)
IMM GRANULOCYTES NFR BLD AUTO: 0.2 % (ref 0–0.5)
INR PPP: 2.1 (ref 0.8–1.2)
LDH SERPL L TO P-CCNC: 524 U/L (ref 110–260)
LYMPHOCYTES # BLD AUTO: 1.3 K/UL (ref 1–4.8)
LYMPHOCYTES NFR BLD: 13.5 % (ref 18–48)
MAGNESIUM SERPL-MCNC: 2 MG/DL (ref 1.6–2.6)
MCH RBC QN AUTO: 19.3 PG (ref 27–31)
MCHC RBC AUTO-ENTMCNC: 28.1 G/DL (ref 32–36)
MCV RBC AUTO: 69 FL (ref 82–98)
MONOCYTES # BLD AUTO: 1 K/UL (ref 0.3–1)
MONOCYTES NFR BLD: 10 % (ref 4–15)
NEUTROPHILS # BLD AUTO: 7.2 K/UL (ref 1.8–7.7)
NEUTROPHILS NFR BLD: 75.4 % (ref 38–73)
NRBC BLD-RTO: 0 /100 WBC
PHOSPHATE SERPL-MCNC: 4.2 MG/DL (ref 2.7–4.5)
PLATELET # BLD AUTO: 522 K/UL (ref 150–450)
PMV BLD AUTO: 9.6 FL (ref 9.2–12.9)
POCT GLUCOSE: 159 MG/DL (ref 70–110)
POCT GLUCOSE: 206 MG/DL (ref 70–110)
POCT GLUCOSE: 233 MG/DL (ref 70–110)
POCT GLUCOSE: 259 MG/DL (ref 70–110)
POCT GLUCOSE: 65 MG/DL (ref 70–110)
POCT GLUCOSE: 65 MG/DL (ref 70–110)
POTASSIUM SERPL-SCNC: 4.7 MMOL/L (ref 3.5–5.1)
PROTHROMBIN TIME: 21.6 SEC (ref 9–12.5)
RBC # BLD AUTO: 4.05 M/UL (ref 4.6–6.2)
SODIUM SERPL-SCNC: 131 MMOL/L (ref 136–145)
WBC # BLD AUTO: 9.53 K/UL (ref 3.9–12.7)

## 2024-09-07 PROCEDURE — 99232 SBSQ HOSP IP/OBS MODERATE 35: CPT | Mod: ,,,

## 2024-09-07 PROCEDURE — 85610 PROTHROMBIN TIME: CPT | Performed by: STUDENT IN AN ORGANIZED HEALTH CARE EDUCATION/TRAINING PROGRAM

## 2024-09-07 PROCEDURE — 25000003 PHARM REV CODE 250: Performed by: INTERNAL MEDICINE

## 2024-09-07 PROCEDURE — 83735 ASSAY OF MAGNESIUM: CPT | Performed by: STUDENT IN AN ORGANIZED HEALTH CARE EDUCATION/TRAINING PROGRAM

## 2024-09-07 PROCEDURE — 27000248 HC VAD-ADDITIONAL DAY

## 2024-09-07 PROCEDURE — 25000003 PHARM REV CODE 250: Performed by: PHYSICIAN ASSISTANT

## 2024-09-07 PROCEDURE — 25000003 PHARM REV CODE 250

## 2024-09-07 PROCEDURE — 36415 COLL VENOUS BLD VENIPUNCTURE: CPT | Performed by: STUDENT IN AN ORGANIZED HEALTH CARE EDUCATION/TRAINING PROGRAM

## 2024-09-07 PROCEDURE — 93750 INTERROGATION VAD IN PERSON: CPT | Mod: ,,, | Performed by: INTERNAL MEDICINE

## 2024-09-07 PROCEDURE — 83615 LACTATE (LD) (LDH) ENZYME: CPT | Performed by: STUDENT IN AN ORGANIZED HEALTH CARE EDUCATION/TRAINING PROGRAM

## 2024-09-07 PROCEDURE — 80048 BASIC METABOLIC PNL TOTAL CA: CPT | Performed by: STUDENT IN AN ORGANIZED HEALTH CARE EDUCATION/TRAINING PROGRAM

## 2024-09-07 PROCEDURE — 63600175 PHARM REV CODE 636 W HCPCS: Performed by: INTERNAL MEDICINE

## 2024-09-07 PROCEDURE — 84100 ASSAY OF PHOSPHORUS: CPT | Performed by: STUDENT IN AN ORGANIZED HEALTH CARE EDUCATION/TRAINING PROGRAM

## 2024-09-07 PROCEDURE — 25000003 PHARM REV CODE 250: Performed by: STUDENT IN AN ORGANIZED HEALTH CARE EDUCATION/TRAINING PROGRAM

## 2024-09-07 PROCEDURE — 20600001 HC STEP DOWN PRIVATE ROOM

## 2024-09-07 PROCEDURE — 99233 SBSQ HOSP IP/OBS HIGH 50: CPT | Mod: 95,,, | Performed by: NURSE PRACTITIONER

## 2024-09-07 PROCEDURE — 85025 COMPLETE CBC W/AUTO DIFF WBC: CPT | Performed by: STUDENT IN AN ORGANIZED HEALTH CARE EDUCATION/TRAINING PROGRAM

## 2024-09-07 PROCEDURE — 85730 THROMBOPLASTIN TIME PARTIAL: CPT | Performed by: STUDENT IN AN ORGANIZED HEALTH CARE EDUCATION/TRAINING PROGRAM

## 2024-09-07 PROCEDURE — 63600175 PHARM REV CODE 636 W HCPCS: Performed by: STUDENT IN AN ORGANIZED HEALTH CARE EDUCATION/TRAINING PROGRAM

## 2024-09-07 RX ORDER — INSULIN ASPART 100 [IU]/ML
12 INJECTION, SOLUTION INTRAVENOUS; SUBCUTANEOUS
Status: DISCONTINUED | OUTPATIENT
Start: 2024-09-07 | End: 2024-09-07

## 2024-09-07 RX ORDER — INSULIN GLARGINE 100 [IU]/ML
12 INJECTION, SOLUTION SUBCUTANEOUS 2 TIMES DAILY
Status: DISCONTINUED | OUTPATIENT
Start: 2024-09-07 | End: 2024-09-08

## 2024-09-07 RX ORDER — INSULIN ASPART 100 [IU]/ML
8 INJECTION, SOLUTION INTRAVENOUS; SUBCUTANEOUS
Status: DISCONTINUED | OUTPATIENT
Start: 2024-09-08 | End: 2024-09-10 | Stop reason: HOSPADM

## 2024-09-07 RX ADMIN — Medication 400 MG: at 08:09

## 2024-09-07 RX ADMIN — GABAPENTIN 100 MG: 100 CAPSULE ORAL at 08:09

## 2024-09-07 RX ADMIN — CEFAZOLIN 2 G: 2 INJECTION, POWDER, FOR SOLUTION INTRAMUSCULAR; INTRAVENOUS at 01:09

## 2024-09-07 RX ADMIN — INSULIN ASPART 4 UNITS: 100 INJECTION, SOLUTION INTRAVENOUS; SUBCUTANEOUS at 12:09

## 2024-09-07 RX ADMIN — POTASSIUM CHLORIDE 40 MEQ: 1500 TABLET, EXTENDED RELEASE ORAL at 08:09

## 2024-09-07 RX ADMIN — LEVETIRACETAM 1500 MG: 500 TABLET, FILM COATED ORAL at 09:09

## 2024-09-07 RX ADMIN — ACETAMINOPHEN 650 MG: 325 TABLET ORAL at 11:09

## 2024-09-07 RX ADMIN — DULOXETINE HYDROCHLORIDE 30 MG: 30 CAPSULE, DELAYED RELEASE ORAL at 08:09

## 2024-09-07 RX ADMIN — AMLODIPINE BESYLATE 5 MG: 5 TABLET ORAL at 08:09

## 2024-09-07 RX ADMIN — ATORVASTATIN CALCIUM 40 MG: 20 TABLET, FILM COATED ORAL at 08:09

## 2024-09-07 RX ADMIN — INSULIN ASPART 10 UNITS: 100 INJECTION, SOLUTION INTRAVENOUS; SUBCUTANEOUS at 12:09

## 2024-09-07 RX ADMIN — WARFARIN SODIUM 1 MG: 1 TABLET ORAL at 04:09

## 2024-09-07 RX ADMIN — EPLERENONE 25 MG: 25 TABLET, FILM COATED ORAL at 08:09

## 2024-09-07 RX ADMIN — ACETAMINOPHEN 650 MG: 325 TABLET ORAL at 01:09

## 2024-09-07 RX ADMIN — LEVETIRACETAM 1500 MG: 500 TABLET, FILM COATED ORAL at 08:09

## 2024-09-07 RX ADMIN — CEFAZOLIN 2 G: 2 INJECTION, POWDER, FOR SOLUTION INTRAMUSCULAR; INTRAVENOUS at 09:09

## 2024-09-07 RX ADMIN — INSULIN ASPART 3 UNITS: 100 INJECTION, SOLUTION INTRAVENOUS; SUBCUTANEOUS at 09:09

## 2024-09-07 RX ADMIN — POTASSIUM CHLORIDE 40 MEQ: 1500 TABLET, EXTENDED RELEASE ORAL at 09:09

## 2024-09-07 RX ADMIN — GABAPENTIN 100 MG: 100 CAPSULE ORAL at 12:09

## 2024-09-07 RX ADMIN — INSULIN ASPART 4 UNITS: 100 INJECTION, SOLUTION INTRAVENOUS; SUBCUTANEOUS at 08:09

## 2024-09-07 RX ADMIN — ASPIRIN 81 MG: 81 TABLET, COATED ORAL at 08:09

## 2024-09-07 RX ADMIN — INSULIN GLARGINE 12 UNITS: 100 INJECTION, SOLUTION SUBCUTANEOUS at 09:09

## 2024-09-07 RX ADMIN — CEFAZOLIN 2 G: 2 INJECTION, POWDER, FOR SOLUTION INTRAMUSCULAR; INTRAVENOUS at 05:09

## 2024-09-07 RX ADMIN — CYCLOBENZAPRINE HYDROCHLORIDE 5 MG: 5 TABLET, FILM COATED ORAL at 08:09

## 2024-09-07 RX ADMIN — INSULIN ASPART 10 UNITS: 100 INJECTION, SOLUTION INTRAVENOUS; SUBCUTANEOUS at 08:09

## 2024-09-07 RX ADMIN — AMIODARONE HYDROCHLORIDE 400 MG: 200 TABLET ORAL at 09:09

## 2024-09-07 RX ADMIN — PANTOPRAZOLE SODIUM 40 MG: 40 TABLET, DELAYED RELEASE ORAL at 08:09

## 2024-09-07 RX ADMIN — POTASSIUM CHLORIDE 40 MEQ: 1500 TABLET, EXTENDED RELEASE ORAL at 04:09

## 2024-09-07 RX ADMIN — GABAPENTIN 400 MG: 400 CAPSULE ORAL at 09:09

## 2024-09-07 RX ADMIN — Medication 16 G: at 04:09

## 2024-09-07 RX ADMIN — CYCLOBENZAPRINE HYDROCHLORIDE 5 MG: 5 TABLET, FILM COATED ORAL at 09:09

## 2024-09-07 RX ADMIN — AMIODARONE HYDROCHLORIDE 400 MG: 200 TABLET ORAL at 08:09

## 2024-09-07 RX ADMIN — FLUCONAZOLE 400 MG: 2 INJECTION, SOLUTION INTRAVENOUS at 08:09

## 2024-09-07 RX ADMIN — INSULIN GLARGINE 12 UNITS: 100 INJECTION, SOLUTION SUBCUTANEOUS at 08:09

## 2024-09-07 NOTE — PLAN OF CARE
Plan of care discussed with patient.  Patient ambulating independently, fall precautions in place. LVAD DP and numbers WNL, smooth LVAD hum. Denies CP, SOB or pain. IV antibiotics maintained. Discussed medications and care. Patient has no questions at this time. Will continue to monitor.

## 2024-09-07 NOTE — SUBJECTIVE & OBJECTIVE
"Interval HPI:   No acute events overnight. Patient in room 319/319 A. Blood glucose worsening. BG at and above goal on current insulin regimen (SSI, prandial, and basal insulin ). Steroid use- None.    Renal function- Normal   Lab Results   Component Value Date    CREATININE 1.2 2024     Vasopressors-  None     Endocrine will continue to follow and manage insulin orders inpatient.     Diet diabetic Cardiac (Low Na/Chol); 2000 Calorie; Fluid - 1500mL; Standard Tray     Eatin%  Nausea: No  Hypoglycemia and intervention: No  Fever: No  TPN and/or TF: No  If yes, type of TF/TPN and rate: N/A    BP (!) 90/0 (BP Location: Left arm, Patient Position: Lying)   Pulse 81   Temp 97.7 °F (36.5 °C) (Oral)   Resp 16   Ht 6' 3" (1.905 m)   Wt 70.2 kg (154 lb 12.2 oz)   SpO2 98%   BMI 19.34 kg/m²     Labs Reviewed and Include    Recent Labs   Lab 24  0511   *   CALCIUM 9.8   *   K 4.7   CO2 22*   CL 96   BUN 23*   CREATININE 1.2     Lab Results   Component Value Date    WBC 9.53 2024    HGB 7.8 (L) 2024    HCT 27.8 (L) 2024    MCV 69 (L) 2024     (H) 2024     No results for input(s): "TSH", "FREET4" in the last 168 hours.  Lab Results   Component Value Date    HGBA1C 10.3 (H) 2024       Nutritional status:   Body mass index is 19.34 kg/m².  Lab Results   Component Value Date    ALBUMIN 2.6 (L) 2024    ALBUMIN 2.6 (L) 2024    ALBUMIN 2.6 (L) 2024     Lab Results   Component Value Date    PREALBUMIN 10 (L) 2024    PREALBUMIN 10 (L) 2024    PREALBUMIN 10 (L) 2024       Estimated Creatinine Clearance: 82.1 mL/min (based on SCr of 1.2 mg/dL).    Accu-Checks  Recent Labs     24  0643 24  1058 24  1553 24  0727 24  1046 24  1559 24  0639   POCTGLUCOSE 149* 114* 116* 176* 178* 137* 193* 169* 220* 206*       Current Medications and/or " Treatments Impacting Glycemic Control  Immunotherapy:    Immunosuppressants       None          Steroids:   Hormones (From admission, onward)      None          Pressors:    Autonomic Drugs (From admission, onward)      None          Hyperglycemia/Diabetes Medications:   Antihyperglycemics (From admission, onward)      Start     Stop Route Frequency Ordered    09/07/24 0900  insulin glargine U-100 (Lantus) pen 12 Units         -- SubQ 2 times daily 09/07/24 0809    09/02/24 1130  insulin aspart U-100 pen 10 Units         -- SubQ 3 times daily with meals 09/02/24 0841    08/25/24 1506  insulin aspart U-100 pen 0-10 Units         -- SubQ Before meals & nightly PRN 08/25/24 1407

## 2024-09-07 NOTE — NURSING
Nurses Note -- 4 Eyes      9/7/2024   8:10 AM      Skin assessed during: Q Shift Change      [x] No Altered Skin Integrity Present    []Prevention Measures Documented      [] Yes- Altered Skin Integrity Present or Discovered   [] LDA Added if Not in Epic (Describe Wound)   [] New Altered Skin Integrity was Present on Admit and Documented in LDA   [] Wound Image Taken    Wound Care Consulted? No    Attending Nurse:  Robin Mullen RN/Staff Member:   Julianne

## 2024-09-07 NOTE — SUBJECTIVE & OBJECTIVE
Interval History: No complaints this AM or events overnight. Repeat Bcx's showing persistent fungemia.. Diuresed well with IV lasix.    Continuous Infusions: none    furosemide (Lasix) 500 mg in 50 mL infusion (conc: 10 mg/mL)  10 mg/hr Intravenous Continuous 1 mL/hr at 09/06/24 1030 10 mg/hr at 09/06/24 1030       Scheduled Meds:   [START ON 9/9/2024] amiodarone  200 mg Oral Daily    amiodarone  400 mg Oral BID    amLODIPine  5 mg Oral Daily    aspirin  81 mg Oral Daily    atorvastatin  40 mg Oral Daily    ceFAZolin (Ancef) IV (PEDS and ADULTS)  2 g Intravenous Q8H    cyclobenzaprine  5 mg Oral BID    DULoxetine  30 mg Oral Daily    eplerenone  25 mg Oral Daily    fluconazole (DIFLUCAN) IV (PEDS and ADULTS)  400 mg Intravenous Daily    gabapentin  100 mg Oral BID    gabapentin  400 mg Oral QHS    insulin aspart U-100  10 Units Subcutaneous TIDWM    insulin glargine U-100  12 Units Subcutaneous BID    levETIRAcetam  1,500 mg Oral BID    LIDOcaine  2 patch Transdermal Q24H    magnesium oxide  400 mg Oral Daily    nicotine  1 patch Transdermal Daily    pantoprazole  40 mg Oral Daily    polyethylene glycol  17 g Oral Daily    potassium chloride  40 mEq Oral TID    warfarin  1 mg Oral Daily     PRN Meds:  Current Facility-Administered Medications:     acetaminophen, 650 mg, Oral, Q6H PRN    dextrose 10%, 12.5 g, Intravenous, PRN    dextrose 10%, 25 g, Intravenous, PRN    glucagon (human recombinant), 1 mg, Intramuscular, PRN    glucose, 16 g, Oral, PRN    glucose, 24 g, Oral, PRN    insulin aspart U-100, 0-10 Units, Subcutaneous, QID (AC + HS) PRN    ondansetron, 4 mg, Oral, Q8H PRN    oxyCODONE, 10 mg, Oral, Daily PRN    Review of patient's allergies indicates:   Allergen Reactions    Torsemide Hives     Objective:     Vital Signs (Most Recent):  Temp: 97.7 °F (36.5 °C) (09/07/24 0730)  Pulse: 81 (09/07/24 0809)  Resp: 16 (09/07/24 0730)  BP: (!) 90/0 (09/07/24 0730)  SpO2: 98 % (09/07/24 0730) Vital Signs (24h  Range):  Temp:  [97.6 °F (36.4 °C)-98.6 °F (37 °C)] 97.7 °F (36.5 °C)  Pulse:  [78-92] 81  Resp:  [14-18] 16  SpO2:  [96 %-100 %] 98 %  BP: ()/(0-78) 90/0     Patient Vitals for the past 72 hrs (Last 3 readings):   Weight   09/07/24 0614 70.2 kg (154 lb 12.2 oz)         Body mass index is 19.34 kg/m².      Intake/Output Summary (Last 24 hours) at 9/7/2024 0997  Last data filed at 9/7/2024 0847  Gross per 24 hour   Intake 1244 ml   Output 2790 ml   Net -1546 ml          Physical Exam  Constitutional:       General: He is not in acute distress.     Appearance: He is normal weight.   HENT:      Head: Normocephalic and atraumatic.      Right Ear: External ear normal.      Left Ear: External ear normal.      Nose: Nose normal.      Mouth/Throat:      Pharynx: Oropharynx is clear.   Cardiovascular:      Comments: Normal VAD hum, JVD present but improving   Pulmonary:      Effort: Pulmonary effort is normal.   Abdominal:      General: Abdomen is flat. Bowel sounds are normal. There is no distension (Distended but soft.).      Palpations: Abdomen is soft. There is no mass.      Tenderness: There is no abdominal tenderness. There is right CVA tenderness.   Musculoskeletal:      Cervical back: Normal range of motion.      Right lower leg: No edema.      Left lower leg: No edema.      Comments: Cont b/l back pain- upper spine and b/l paraspinal/upper back muscular pain.    Skin:     General: Skin is warm.      Comments: Chest wall on the right side with gynecomastia, no overlying skin changes, no tenderness at the site however palpable mass versus nodule present.   Neurological:      Mental Status: He is alert. Mental status is at baseline.            Significant Labs:  CBC:  Recent Labs   Lab 09/05/24  0217 09/06/24  0525 09/07/24  0511   WBC 9.34 9.78 9.53   RBC 4.08* 4.11* 4.05*   HGB 7.8* 8.0* 7.8*   HCT 27.8* 27.8* 27.8*   * 596* 522*   MCV 68* 68* 69*   MCH 19.1* 19.5* 19.3*   MCHC 28.1* 28.8* 28.1*      BNP:  Recent Labs   Lab 09/02/24 0205 09/04/24 0217 09/06/24  0525   BNP 1,053* 1,170* 1,022*     CMP:  Recent Labs   Lab 09/02/24  0205 09/03/24  0334 09/04/24 0217 09/04/24 0218 09/05/24 0217 09/06/24  0526 09/07/24  0511   GLU 41*   < >  --    < > 67* 131* 171*   CALCIUM 9.1   < >  --    < > 9.0 9.5 9.8   ALBUMIN 2.6*  --  2.6*  --   --  2.6*  --    PROT 8.5*  --  8.5*  --   --  9.0*  --    *   < >  --    < > 131* 130* 131*   K 3.4*   < >  --    < > 3.4* 4.6 4.7   CO2 26   < >  --    < > 27 26 22*   CL 93*   < >  --    < > 91* 93* 96   BUN 20   < >  --    < > 14 20 23*   CREATININE 1.2   < >  --    < > 1.1 1.2 1.2   ALKPHOS 230*  --  237*  --   --  267*  --    ALT 8*  --  8*  --   --  5*  --    AST 31  --  30  --   --  38  --    BILITOT 1.9*  --  1.7*  --   --  2.0*  --     < > = values in this interval not displayed.      Coagulation:   Recent Labs   Lab 09/05/24 0217 09/06/24 0526 09/07/24  0511   INR 3.4* 3.1* 2.1*   APTT 43.0* 39.8* 40.4*     LDH:  Recent Labs   Lab 09/05/24 0217 09/06/24 0526 09/07/24  0511   * 366* 524*     Microbiology:  Microbiology Results (last 7 days)       Procedure Component Value Units Date/Time    Blood culture [[id number]] Collected: 09/04/24 0217    Order Status: Completed Specimen: Blood Updated: 09/07/24 0613     Blood Culture, Routine No Growth to date      No Growth to date      No Growth to date      No Growth to date    Blood culture [[id number]]  (Abnormal)  (Susceptibility) Collected: 08/30/24 1152    Order Status: Completed Specimen: Blood Updated: 09/06/24 1356     Blood Culture, Routine Gram stain dudley bottle: yeast      Results called to and read back by: Rufino 09/01/2024  02:03      CANDIDA PARAPSILOSIS  ID consult required at [facility].  ID consult required at [facility].      Narrative:      Lft AC    Blood culture [[id number]]  (Abnormal) Collected: 09/01/24 0939    Order  Status: Completed Specimen: Blood Updated: 09/06/24 1220     Blood Culture, Routine Gram stain dudley bottle: yeast      Results called to and read back by: Rufino 09/03/2024  06:15      CANDIDA PARAPSILOSIS  For susceptibility see order #[id number]  ID consult required at [facility].      Blood culture [[id number]]  (Abnormal) Collected: 08/28/24 2226    Order Status: Completed Specimen: Blood Updated: 09/06/24 1219     Blood Culture, Routine Gram stain aer bottle: yeast      Gram stain dudley bottle: yeast      Positive results previously called 08/30/2024      YEAST   Identification pending  For susceptibility see order #[id number]  ID consult required at [facility] and [facility].      Narrative:      Code 67031  Draw sample # 2 from separate site    Blood culture [[id number]]  (Abnormal) Collected: 08/28/24 2226    Order Status: Completed Specimen: Blood Updated: 09/06/24 1218     Blood Culture, Routine Gram stain aer bottle: yeast      Results called to and read back by:Lorri Riggs RN 08/30/2024  17:10      YEAST   Identification pending  For susceptibility see order #[id number]  ID consult required at [facility] and [facility].      Narrative:      Code 59007  Draw sample # 2 from separate site    Blood culture [[id number]]  (Abnormal) Collected: 08/27/24 1751    Order Status: Completed Specimen: Blood Updated: 09/06/24 1218     Blood Culture, Routine Gram stain dudley bottle: Gram positive cocci in clusters resembling Staph      Results called to and read back by: Lorri Riggs RN  08/29/2024   13:50      Gram stain aer bottle: yeast      Results called to and read back by:Lorri Riggs RN 08/30/2024      STAPHYLOCOCCUS AUREUS  ID consult required at [facility] and [facility].  For susceptibility see order #[id number]        YEAST   Identification pending  For susceptibility see order #[id number]  [facility] [place] [facility]      Narrative:      Blood culture # 2 different location.    Blood culture [[id number]]  (Abnormal) Collected: 09/01/24 0939    Order Status: Completed Specimen: Blood Updated: 09/06/24 1130     Blood Culture, Routine Gram stain aer bottle: yeast      Gram stain dudley bottle: yeast      Results called to and read back by: Rufino 09/03/2024  01:45      YEAST   Identification pending  ID consult required at [facility].  For susceptibility see order #[id number]      Blood culture [[id number]] Collected: 08/30/24 1152    Order Status: Completed Specimen: Blood Updated: 09/04/24 1412     Blood Culture, Routine No growth after 5 days.    Narrative:      Rt AC    Culture, Anaerobe [[id number]] Collected: 08/27/24 1446    Order Status: Completed Specimen: Skin from Abdomen Updated: 09/02/24 0936     Anaerobic Culture No anaerobes isolated    Narrative:      Driveline site    Blood culture [[id number]]  (Abnormal)  (Susceptibility) Collected: 08/27/24 1802    Order Status: Completed Specimen: Blood Updated: 08/31/24 1049     Blood Culture, Routine Gram stain aer bottle: Gram positive cocci in clusters resembling Staph      Results called to and read back by:Miguelina Thomas RN 08/28/2024  22:59      MICROCOCCUS SPECIES  Organism is a probable contaminant        STAPHYLOCOCCUS AUREUS  ID consult required at [facility] and [facility] locations.              I have reviewed all pertinent labs within the past 24 hours.    Estimated Creatinine Clearance: 82.1 mL/min (based on SCr of 1.2 mg/dL).    Diagnostic Results:  I have reviewed and interpreted all pertinent imaging results/findings within the past 24 hours.

## 2024-09-07 NOTE — PROGRESS NOTES
[facility] - Cardiology Stepdown  Heart Transplant  Progress Note    Patient Name: Kevan Queen  MRN: [id number]  Admission Date: 8/25/2024  Hospital Length of Stay: 13 days  Attending Physician: Natalya Villatoro MD  Primary Care Provider: Rosio Armendariz FNP  Principal Problem:LVAD (left ventricular assist device) present    Subjective:   Interval History: No complaints this AM or events overnight. Repeat Bcx's showing persistent fungemia.. Diuresed well with IV lasix.    Continuous Infusions: none    furosemide (Lasix) 500 mg in 50 mL infusion (conc: 10 mg/mL)  10 mg/hr Intravenous Continuous 1 mL/hr at 09/06/24 1030 10 mg/hr at 09/06/24 1030       Scheduled Meds:   [START ON 9/9/2024] amiodarone  200 mg Oral Daily    amiodarone  400 mg Oral BID    amLODIPine  5 mg Oral Daily    aspirin  81 mg Oral Daily    atorvastatin  40 mg Oral Daily    ceFAZolin (Ancef) IV (PEDS and ADULTS)  2 g Intravenous Q8H    cyclobenzaprine  5 mg Oral BID    DULoxetine  30 mg Oral Daily    eplerenone  25 mg Oral Daily    fluconazole (DIFLUCAN) IV (PEDS and ADULTS)  400 mg Intravenous Daily    gabapentin  100 mg Oral BID    gabapentin  400 mg Oral QHS    insulin aspart U-100  10 Units Subcutaneous TIDWM    insulin glargine U-100  12 Units Subcutaneous BID    levETIRAcetam  1,500 mg Oral BID    LIDOcaine  2 patch Transdermal Q24H    magnesium oxide  400 mg Oral Daily    nicotine  1 patch Transdermal Daily    pantoprazole  40 mg Oral Daily    polyethylene glycol  17 g Oral Daily    potassium chloride  40 mEq Oral TID    warfarin  1 mg Oral Daily     PRN Meds:  Current Facility-Administered Medications:     acetaminophen, 650 mg, Oral, Q6H PRN    dextrose 10%, 12.5 g, Intravenous, PRN    dextrose 10%, 25 g, Intravenous, PRN    glucagon (human recombinant), 1 mg, Intramuscular, PRN    glucose, 16 g, Oral, PRN    glucose, 24 g, Oral, PRN    insulin aspart U-100, 0-10 Units, Subcutaneous, QID (AC + HS) PRN    ondansetron, 4 mg, Oral,  Q8H PRN    oxyCODONE, 10 mg, Oral, Daily PRN    Review of patient's allergies indicates:   Allergen Reactions    Torsemide Hives     Objective:     Vital Signs (Most Recent):  Temp: 97.7 °F (36.5 °C) (09/07/24 0730)  Pulse: 81 (09/07/24 0809)  Resp: 16 (09/07/24 0730)  BP: (!) 90/0 (09/07/24 0730)  SpO2: 98 % (09/07/24 0730) Vital Signs (24h Range):  Temp:  [97.6 °F (36.4 °C)-98.6 °F (37 °C)] 97.7 °F (36.5 °C)  Pulse:  [78-92] 81  Resp:  [14-18] 16  SpO2:  [96 %-100 %] 98 %  BP: ()/(0-78) 90/0     Patient Vitals for the past 72 hrs (Last 3 readings):   Weight   09/07/24 0614 70.2 kg (154 lb 12.2 oz)         Body mass index is 19.34 kg/m².      Intake/Output Summary (Last 24 hours) at 9/7/2024 0952  Last data filed at 9/7/2024 0847  Gross per 24 hour   Intake 1244 ml   Output 2790 ml   Net -1546 ml          Physical Exam  Constitutional:       General: He is not in acute distress.     Appearance: He is normal weight.   HENT:      Head: Normocephalic and atraumatic.      Right Ear: External ear normal.      Left Ear: External ear normal.      Nose: Nose normal.      Mouth/Throat:      Pharynx: Oropharynx is clear.   Cardiovascular:      Comments: Normal VAD hum, JVD present but improving   Pulmonary:      Effort: Pulmonary effort is normal.   Abdominal:      General: Abdomen is flat. Bowel sounds are normal. There is no distension (Distended but soft.).      Palpations: Abdomen is soft. There is no mass.      Tenderness: There is no abdominal tenderness. There is right CVA tenderness.   Musculoskeletal:      Cervical back: Normal range of motion.      Right lower leg: No edema.      Left lower leg: No edema.      Comments: Cont b/l back pain- upper spine and b/l paraspinal/upper back muscular pain.    Skin:     General: Skin is warm.      Comments: Chest wall on the right side with gynecomastia, no overlying skin changes, no tenderness at the site however palpable mass versus nodule present.   Neurological:       Mental Status: He is alert. Mental status is at baseline.            Significant Labs:  CBC:  Recent Labs   Lab 09/05/24 0217 09/06/24 0525 09/07/24  0511   WBC 9.34 9.78 9.53   RBC 4.08* 4.11* 4.05*   HGB 7.8* 8.0* 7.8*   HCT 27.8* 27.8* 27.8*   * 596* 522*   MCV 68* 68* 69*   MCH 19.1* 19.5* 19.3*   MCHC 28.1* 28.8* 28.1*     BNP:  Recent Labs   Lab 09/02/24 0205 09/04/24 0217 09/06/24 0525   BNP 1,053* 1,170* 1,022*     CMP:  Recent Labs   Lab 09/02/24 0205 09/03/24  0334 09/04/24 0217 09/04/24 0218 09/05/24 0217 09/06/24 0526 09/07/24  0511   GLU 41*   < >  --    < > 67* 131* 171*   CALCIUM 9.1   < >  --    < > 9.0 9.5 9.8   ALBUMIN 2.6*  --  2.6*  --   --  2.6*  --    PROT 8.5*  --  8.5*  --   --  9.0*  --    *   < >  --    < > 131* 130* 131*   K 3.4*   < >  --    < > 3.4* 4.6 4.7   CO2 26   < >  --    < > 27 26 22*   CL 93*   < >  --    < > 91* 93* 96   BUN 20   < >  --    < > 14 20 23*   CREATININE 1.2   < >  --    < > 1.1 1.2 1.2   ALKPHOS 230*  --  237*  --   --  267*  --    ALT 8*  --  8*  --   --  5*  --    AST 31  --  30  --   --  38  --    BILITOT 1.9*  --  1.7*  --   --  2.0*  --     < > = values in this interval not displayed.      Coagulation:   Recent Labs   Lab 09/05/24 0217 09/06/24 0526 09/07/24  0511   INR 3.4* 3.1* 2.1*   APTT 43.0* 39.8* 40.4*     LDH:  Recent Labs   Lab 09/05/24 0217 09/06/24  0526 09/07/24  0511   * 366* 524*     Microbiology:  Microbiology Results (last 7 days)       Procedure Component Value Units Date/Time    Blood culture [[id number]] Collected: 09/04/24 0217    Order Status: Completed Specimen: Blood Updated: 09/07/24 0613     Blood Culture, Routine No Growth to date      No Growth to date      No Growth to date      No Growth to date    Blood culture [[id number]]  (Abnormal)  (Susceptibility) Collected: 08/30/24 1152    Order Status: Completed Specimen: Blood Updated: 09/06/24 1356     Blood Culture, Routine Gram stain dudley bottle: yeast       Results called to and read back by: Rufino 09/01/2024  02:03      CANDIDA PARAPSILOSIS  ID consult required at [facility].  ID consult required at [facility].      Narrative:      Lft AC    Blood culture [[id number]]  (Abnormal) Collected: 09/01/24 0939    Order Status: Completed Specimen: Blood Updated: 09/06/24 1220     Blood Culture, Routine Gram stain dudley bottle: yeast      Results called to and read back by: Rufino 09/03/2024  06:15      CANDIDA PARAPSILOSIS  For susceptibility see order #[id number]  ID consult required at [facility].      Blood culture [[id number]]  (Abnormal) Collected: 08/28/24 2226    Order Status: Completed Specimen: Blood Updated: 09/06/24 1219     Blood Culture, Routine Gram stain aer bottle: yeast      Gram stain dudley bottle: yeast      Positive results previously called 08/30/2024      YEAST   Identification pending  For susceptibility see order #[id number]  ID consult required at [facility].      Narrative:      Code 30500  Draw sample # 2 from separate site    Blood culture [[id number]]  (Abnormal) Collected: 08/28/24 2226    Order Status: Completed Specimen: Blood Updated: 09/06/24 1218     Blood Culture, Routine Gram stain aer bottle: yeast      Results called to and read back by:Lorri Riggs RN 08/30/2024  17:10      YEAST   Identification pending  For susceptibility see order #[id number]  ID consult required at [facility].      Narrative:      Code 32152  Draw sample # 2 from separate site    Blood culture [[id number]]  (Abnormal) Collected: 08/27/24 1751    Order Status: Completed Specimen: Blood Updated: 09/06/24 1218     Blood Culture, Routine Gram stain dudley bottle: Gram positive cocci in clusters resembling Staph      Results called to and read back by: Lorri Riggs RN  08/29/2024   13:50      Gram stain aer bottle:  yeast      Results called to and read back by:Lorri Riggs RN 08/30/2024      STAPHYLOCOCCUS AUREUS  ID consult required at [facility].  For susceptibility see order #[id number]        YEAST   Identification pending  For susceptibility see order #[id number]  ID consult required at [facility].      Narrative:      Blood culture # 2 different location.    Blood culture [[id number]]  (Abnormal) Collected: 09/01/24 0939    Order Status: Completed Specimen: Blood Updated: 09/06/24 1130     Blood Culture, Routine Gram stain aer bottle: yeast      Gram stain dudley bottle: yeast      Results called to and read back by: Rufino 09/03/2024  01:45      YEAST   Identification pending  ID consult required at [facility].  For susceptibility see order #[id number]      Blood culture [[id number]] Collected: 08/30/24 1152    Order Status: Completed Specimen: Blood Updated: 09/04/24 1412     Blood Culture, Routine No growth after 5 days.    Narrative:      Rt AC    Culture, Anaerobe [[id number]] Collected: 08/27/24 1446    Order Status: Completed Specimen: Skin from Abdomen Updated: 09/02/24 0936     Anaerobic Culture No anaerobes isolated    Narrative:      Driveline site    Blood culture [[id number]]  (Abnormal)  (Susceptibility) Collected: 08/27/24 1802    Order Status: Completed Specimen: Blood Updated: 08/31/24 1049     Blood Culture, Routine Gram stain aer bottle: Gram positive cocci in clusters resembling Staph      Results called to and read back by:Miguelina [facility] RN 08/28/2024  22:59      MICROCOCCUS SPECIES  Organism is a probable contaminant        STAPHYLOCOCCUS AUREUS  ID consult required at [facility].              I have reviewed all pertinent labs within the past 24 hours.    Estimated Creatinine Clearance: 82.1 mL/min (based on SCr of 1.2 mg/dL).    Diagnostic Results:  I have reviewed and  interpreted all pertinent imaging results/findings within the past 24 hours.  Assessment and Plan:     Mr. Kevan Thacker is a 39 year old male with stage D 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 He underwent RVAD removal and chest closure 6/30/2022.  He was weaned off  but he had to restarted due to RVF. He was eventually transitioned to milrinone (secondary to  shortage) now supposed to be on 0.25 mcg/kg/min. He has a history of unclear syncope vs seizures and is followed by neuro for this and is on Keppra.  [facility].      On 11/15/23 underwent removal of eroded [employer] scICD--no Lifevest (due to LVAD) and no plan to replace ICD as not requiring therapy post LVAD with concern for reinfection.  He has not had neurology f/u with seizure hx on keppra so coordinator to assist him with obtaining appt.      Reports back pain (primarily upper back pain) for the past 3-4 days. He reports that it started 2 days after stopping pirmacor and he feels it may be related. He has also been sleeping in the couch and identifies that it may also be related to the back pain. He denies lifting anything heavy or any falls /trauma. No red flags including incontinence of stool or urine. No numbness in the groin area or weakness in the legs or upper extremity reported. He reports he was not able to go for appointment because of transport issue that has now been resolved. He denies orthopnea, PND, weight gain, leg swelling. He says that he does have some shortness of breath after walking long distances which has been ongoing for months now and has not changed lately. He has lost some weight over last few months since  his but weight has been stable over the last month. He denied headache, constipation, diarrhea, SOB, cough, palpitations or any additional symptoms.     * LVAD (left ventricular assist device) present  -HeartMate 3 Implanted  6/29/2022  as DT  -HTS Primary  -cont coumadin, Goal INR 2.0-3.0. Supratherapeutic this AM. Letting INR come down some but have resumed coumadin  Lab Results   Component Value Date    INR 2.1 (H) 09/07/2024    INR 3.1 (H) 09/06/2024    INR 3.4 (H) 09/05/2024       -Antiplatelets ASA 81 mg  -LDH is stable overall today. Will continue to monitor daily.  -Speed set at 5100, LSL 4700 rpm  -Interrogation notable for  power cable disconnected, low voltage advisory, PI events  -Not listed for OHTx- advised pt he would not be candidate for OHTx (noncompliance/poor f/up)   -UDS negative  -attempted to call partner Robyn, unfortunately goes to        Procedure: Device Interrogation Including analysis of device parameters  Current Settings: Ventricular Assist Device  Review of device function is stable/unstable stable        9/7/2024     7:32 AM 9/7/2024     3:58 AM 9/6/2024    11:26 PM 9/6/2024     7:10 PM 9/6/2024     3:58 PM 9/6/2024    11:34 AM 9/6/2024     8:09 AM   TXP LVAD INTERROGATIONS   Type HeartMate3 HeartMate3 HeartMate3 HeartMate3 HeartMate3 HeartMate3 HeartMate3   Flow 3.6 3.7 3.9 3.9 4.1 3.9 3.6   Speed 5100 5100 5100 5150 5100 5100 5100   PI 6.2 6.6 6.4 6.3 5.8 6.3 6.8   Power (Serna) 3.6 3.5 3.6 3.6 3.6 3.6 3.6   LSL 4700 4700 4700 4700 4700 4700 4700   Pulsatility  Intermittent pulse Intermittent pulse Intermittent pulse   Intermittent pulse         Pulmonary nodules  Multiple pulmonary nodules seen on CT scan with contrast. Pt does have hx tobacco use. Lymph node enlargement noted as well. Pt has weight loss but not drastic  -need f/up scan in 3-6 months to assess stability of nodules     Pyelonephritis  Seen on ct with contrast. ID following.   -cont abx per ID   -no need for  percutaneous drain per IR at this time unless pt decompensates  -current bacteremia/yeast not from renal source     Severe sepsis  -CT chest/abdomen and pelvis w/ contrast w/ pyelonephritis and renal abscess   -repeat CT spine with contrast +visualization of kidney read is stable, without worsening pyelo or abscess, no evidence of discitis    Plan:  -F/up cultures- +ve for staph, neg MRSA pcr, yeast now growing in blood as well  -Follow repeat cultures. As of 9/1 still fungemic, susceptibilities pending  -ID following-c/w cefazolin, fluconazole  -C/s to Optho for eval of endopthalmitis in setting of yeast in blood   -Removed PICC on 8/28  -Driveline infection vs pyelo vs PICC related  - TTE negative for endocarditis      Atrial flutter  pt previously went into atach vs aflutter. Pt was started on amiodarone, with rhythm aborted to NSR following bolus. BP remained stable.   -BMP, replace lytes as needed   -cont amiodarone- will complete load and keep on maintenance dosing thereafter  -pt does report intermittently at home having episodes of palpitations that may also represent paroxysmal afib vs flutter at home. Already on a/c. Chadvasc of 5          Polysubstance abuse  Pt has history of polysubstance use.   -UDS negative  -prn oxycodone for pyelonephritis pain x1 per day max given hx substance use       Subcutaneous nodule of breast  Pt with unilateral R sided gynecomastia with hard nodular feeling beneath soft subcutaneous tissue of breast. No notable skin dimpling or rash.   CT with contrast reporting gyneocomastia without fluid collection/abscess. Pulmonary nodules seen on exam (needs f/up scan in 3-6 mos), enlarged lymph nodes noted on scan as well. D/w pt need for continued f/up outpatient for cancer screening. Appears to be more reactive than malignant on most recent scan.  -transition from spironolactone to epleronone     Supratherapeutic INR  -Goal INR 2-3 , INR 4.7 on admission - improving  - pharmacy  to  manage coumadin dosing, cont warfarin   Check INR daily     Lab Results   Component Value Date    INR 3.2 (H) 09/04/2024    INR 3.3 (H) 09/03/2024    INR 2.8 (H) 09/02/2024         Bilateral back pain  Pt reports initial pain following running out of primacor. S/p spinal Xrays without significant abnormality besides degenerative disc disease.  NO red flag symptoms on admission  CT spine cervical, thoracic and lumbar negative for discitis/infectious concern and renal collection/pyelo appears to be unchanged.     Plan:  - Multimodal pain control  - titrate to patient's response  - C/w home gabapentin, tylenol prn and lidocaine patches.   - C/w Prn oxycodone added  - has not been using   - Schedule muscle relaxant overnight and assess for change in symptoms.   - Back pain more pronounced on R side, most likely cause is his pyelonephritis  -c/w abx for pyelonephritis        Hypokalemia  Replace as needed and monitor      Type 2 diabetes mellitus with hyperglycemia, with long-term current use of insulin  A1C 10.3  Endocrinology consulted for glucose management, largely uncontrolled       Lab Results   Component Value Date    HGBA1C 10.3 (H) 08/25/2024    HGBA1C >14.0 (H) 04/26/2024    HGBA1C >14.0 (H) 02/17/2024    [id number] 7.5 06/12/2023    [id number] 8.5 02/07/2023           Moderate malnutrition  -pt with aggressive care goals  -c/s nutrition, appreciate assistance     Seizure-like activity  History   C/w home keppra 1.5 gm BID    CHF (NYHA class IV, ACC/AHA stage D)    Updated 8/26/24:  EF 5-10% global hypokinesis, RV enlargement and global hypokinesis, biatrial enlargement, mild-to-moderate MR, severe TR, no van HeartMate 3 in place, aortic valve does not open, LVEDd 7.2  Previous TTE 9/5/23 with EF 10-15%, mod to sever TR, LVEDd 7.11, LVAD - HM3 in place    -Appears hypervolemic today so will diurese with IV lasix while undergoing inpatient treatment for bacteremia and fungemia   -GDMT: d/c spironolactone given  possible gynecomastia, transition to epleronone   -Previously on home Primacor 0.25 at home but ran out approx 5 days prior to admission and also reports he occasionally disconnects himself from his pump to perform certain activities. Will plan to d/c off primacor   - TTE With LVEF 5-10%, poor RV function, dilation, no signs of endocarditis on TTE   -palliative care c/s and pt discussed with service, appreciate f/up- plan for continued follow up with palliative outpatient as well with Dr. Carr   -repeated [facility] discussion on 8/31- full code, aggressive care, pt educated about compliance going forward - re-iterated. Pt understanding, apologetic for previous noncompliance. Pt understands he is not a candidate for heart transplant and will not be evaluated due to his noncompliance.           Fausto Reyes, NP  Heart Transplant  [facility] - Cardiology Stepdown

## 2024-09-07 NOTE — ASSESSMENT & PLAN NOTE
-HeartMate 3 Implanted  6/29/2022  as DT  -HTS Primary  -cont coumadin, Goal INR 2.0-3.0. Supratherapeutic this AM. Letting INR come down some but have resumed coumadin  Lab Results   Component Value Date    INR 2.1 (H) 09/07/2024    INR 3.1 (H) 09/06/2024    INR 3.4 (H) 09/05/2024       -Antiplatelets ASA 81 mg  -LDH is stable overall today. Will continue to monitor daily.  -Speed set at 5100, LSL 4700 rpm  -Interrogation notable for  power cable disconnected, low voltage advisory, PI events  -Not listed for OHTx- advised pt he would not be candidate for OHTx (noncompliance/poor f/up)   -UDS negative  -attempted to call partner Robyn, unfortunately goes to        Procedure: Device Interrogation Including analysis of device parameters  Current Settings: Ventricular Assist Device  Review of device function is stable/unstable stable        9/7/2024     7:32 AM 9/7/2024     3:58 AM 9/6/2024    11:26 PM 9/6/2024     7:10 PM 9/6/2024     3:58 PM 9/6/2024    11:34 AM 9/6/2024     8:09 AM   TXP LVAD INTERROGATIONS   Type HeartMate3 HeartMate3 HeartMate3 HeartMate3 HeartMate3 HeartMate3 HeartMate3   Flow 3.6 3.7 3.9 3.9 4.1 3.9 3.6   Speed 5100 5100 5100 5150 5100 5100 5100   PI 6.2 6.6 6.4 6.3 5.8 6.3 6.8   Power (Serna) 3.6 3.5 3.6 3.6 3.6 3.6 3.6   LSL 4700 4700 4700 4700 4700 4700 4700   Pulsatility  Intermittent pulse Intermittent pulse Intermittent pulse   Intermittent pulse

## 2024-09-07 NOTE — PROGRESS NOTES
[facility] - Cardiology Stepdown  Endocrinology  Progress Note    Admit Date: 8/25/2024     Reason for Consult: Management of T2DM, Hyperglycemia     Surgical Procedure and Date: LVAD 06/29/2022     Diabetes diagnosis year: 2022    Home Diabetes Medications:  Levemir 20 units twice daily and Novolog SSI (150/25) per patient    How often checking glucose at home?  Has not checked in a few weeks due to running out of strips    BG readings on regimen: COLLIN  Hypoglycemia on the regimen?  Yes; rarely experiences hypoglycemic symptoms such as blurry vision and vomiting. However, does not know his blood sugar level due to running out of supplies. He will self-correct with a snack.  Missed doses on regimen?  Yes, has not had insulin in a few weeks due to running out of glucometer supplies.    Diabetes Complications include:   Hyperglycemia    Complicating diabetes co morbidities:   History of MI, CHF, and CKD      HPI:   Patient is a 39 y.o. 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 He underwent RVAD removal and chest closure 6/30/2022.  He was weaned off  but restarted due to RVF. He was transitioned to milrinone (secondary to  shortage). History of unclear syncope vs seizures and followed by neuro and is on Keppra. On 11/15/23 underwent removal of eroded [employer] scICD--no Lifevest (due to LVAD) and no plan to replace ICD as not requiring therapy post LVAD with concern for reinfection. Reported back pain (primarily upper back pain) for the past 3-4 days. He reported that it started 2 days after stopping pirmacor and he felt it may be related. Reported some shortness of breath after walking long distances which has been ongoing for months now and has not changed lately. He has lost some weight over last few months since his  "but weight has been stable over the last month. Patient admitted after being out of [facility] for 1 week with ongoing back pain. Patient stated he has not taken his insulin in a few weeks due to running out of glucometer testing supplies. He previously had a William, but is not wearing one. BG >700 at [facility]. Endo consulted for BG management.      Interval HPI:   No acute events overnight. Patient in room 319/319 A. Blood glucose worsening. BG at and above goal on current insulin regimen (SSI, prandial, and basal insulin ). Steroid use- None.    Renal function- Normal   Lab Results   Component Value Date    CREATININE 1.2 2024     Vasopressors-  None     Endocrine will continue to follow and manage insulin orders inpatient.     Diet diabetic Cardiac (Low Na/Chol); 2000 Calorie; Fluid - 1500mL; Standard Tray     Eatin%  Nausea: No  Hypoglycemia and intervention: No  Fever: No  TPN and/or TF: No  If yes, type of TF/TPN and rate: N/A    BP (!) 90/0 (BP Location: Left arm, Patient Position: Lying)   Pulse 81   Temp 97.7 °F (36.5 °C) (Oral)   Resp 16   Ht 6' 3" (1.905 m)   Wt 70.2 kg (154 lb 12.2 oz)   SpO2 98%   BMI 19.34 kg/m²     Labs Reviewed and Include    Recent Labs   Lab 24  0511   *   CALCIUM 9.8   *   K 4.7   CO2 22*   CL 96   BUN 23*   CREATININE 1.2     Lab Results   Component Value Date    WBC 9.53 2024    HGB 7.8 (L) 2024    HCT 27.8 (L) 2024    MCV 69 (L) 2024     (H) 2024     No results for input(s): "TSH", "FREET4" in the last 168 hours.  Lab Results   Component Value Date    HGBA1C 10.3 (H) 2024       Nutritional status:   Body mass index is 19.34 kg/m².  Lab Results   Component Value Date    ALBUMIN 2.6 (L) 2024    ALBUMIN 2.6 (L) 2024    ALBUMIN 2.6 (L) 2024     Lab Results   Component Value Date    PREALBUMIN 10 (L) 2024    PREALBUMIN 10 (L) 2024    PREALBUMIN 10 (L) 2024 "       Estimated Creatinine Clearance: 82.1 mL/min (based on SCr of 1.2 mg/dL).    Accu-Checks  Recent Labs     09/04/24 2018 09/05/24  0643 09/05/24  1058 09/05/24  1553 09/05/24 2009 09/06/24  0727 09/06/24  1046 09/06/24  1559 09/06/24 2006 09/07/24  0639   POCTGLUCOSE 149* 114* 116* 176* 178* 137* 193* 169* 220* 206*       Current Medications and/or Treatments Impacting Glycemic Control  Immunotherapy:    Immunosuppressants       None          Steroids:   Hormones (From admission, onward)      None          Pressors:    Autonomic Drugs (From admission, onward)      None          Hyperglycemia/Diabetes Medications:   Antihyperglycemics (From admission, onward)      Start     Stop Route Frequency Ordered    09/07/24 0900  insulin glargine U-100 (Lantus) pen 12 Units         -- SubQ 2 times daily 09/07/24 0809    09/02/24 1130  insulin aspart U-100 pen 10 Units         -- SubQ 3 times daily with meals 09/02/24 0841    08/25/24 1506  insulin aspart U-100 pen 0-10 Units         -- SubQ Before meals & nightly PRN 08/25/24 1407            ASSESSMENT and PLAN    Cardiac/Vascular  * LVAD (left ventricular assist device) present  Managed by primary team  Optimize blood glucose control        CHF (NYHA class IV, ACC/AHA stage D)  Managed by primary team  Optimize blood glucose control      Endocrine  Type 2 diabetes mellitus with hyperglycemia, with long-term current use of insulin  BG goal 140-180  Noncompliant T2DM.     Plan:   - Increase Lantus (Insulin Glargine) to 12 units BID   - Continue Novolog (Insulin Aspart) 10 units TIDWM. HOLD if BG < 100  - Continue moderate dose correction (150/25)  - BG checks AC/HS  - Hypoglycemia protocol in place    ** Please notify Endocrine for any change and/or advance in diet**  ** Please call Endocrine for any BG related issues **    Discharge Planning:   TBD. Please notify endocrinology prior to discharge.  Patient requires glucometer and testing supplies.  Recommend he resumes  his William for blood sugar monitoring.          Guera De Oliveira PA-C  Endocrinology  [facility] - Cardiology Stepdown

## 2024-09-07 NOTE — PLAN OF CARE
Chart reviewed.  1.Fungemia  (BCx positive 8/27- 9/1) BCx 9/4 remains ngtd; concerning for IE vs LVAD infection.   --continue fluconazole for c parapsilosis fungemia  --plan x 6 wks from culture clearance; likely followed by suppression  --monitor QTc       2. MSSA bacteremia  --continue cefazolin to target mssa in blood. Anticipate 6 wks  --no veg on TTE, but no GUILLERMO  --will need line placement for IV abx     Outpatient Antibiotic Therapy Plan:    Please send referral to [facility].    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    4) Outpatient Weekly Labs:    Order the following labs to be drawn on Mondays:   CBC  CMP   CRP  5) Fax Lab Results to Infectious Diseases Provider: Kavya Carrion     [facility] Fax Number: [phone]    6) Outpatient Infectious Diseases Follow-up    Follow-up appointment will be arranged by the ID clinic and will be found in the patient's appointments tab.    Prior to discharge, please ensure the patient's follow-up has been scheduled.    If there is still no follow-up scheduled prior to discharge, please send an [employer] message to [facility] or Call Infectious Diseases Dept.

## 2024-09-07 NOTE — ASSESSMENT & PLAN NOTE
BG goal 140-180  Noncompliant T2DM.     Plan:   - Increase Lantus (Insulin Glargine) to 12 units BID   - Continue Novolog (Insulin Aspart) 10 units TIDWM. HOLD if BG < 100  - Continue moderate dose correction (150/25)  - BG checks AC/HS  - Hypoglycemia protocol in place    ** Please notify Endocrine for any change and/or advance in diet**  ** Please call Endocrine for any BG related issues **    Discharge Planning:   TBD. Please notify endocrinology prior to discharge.  Patient requires glucometer and testing supplies.  Recommend he resumes his William for blood sugar monitoring.

## 2024-09-08 LAB
ANION GAP SERPL CALC-SCNC: 12 MMOL/L (ref 8–16)
APTT PPP: 39.3 SEC (ref 21–32)
BASOPHILS # BLD AUTO: 0.05 K/UL (ref 0–0.2)
BASOPHILS NFR BLD: 0.5 % (ref 0–1.9)
BUN SERPL-MCNC: 26 MG/DL (ref 6–20)
CALCIUM SERPL-MCNC: 9.8 MG/DL (ref 8.7–10.5)
CHLORIDE SERPL-SCNC: 95 MMOL/L (ref 95–110)
CO2 SERPL-SCNC: 24 MMOL/L (ref 23–29)
CREAT SERPL-MCNC: 1.3 MG/DL (ref 0.5–1.4)
DIFFERENTIAL METHOD BLD: ABNORMAL
EOSINOPHIL # BLD AUTO: 0.1 K/UL (ref 0–0.5)
EOSINOPHIL NFR BLD: 0.6 % (ref 0–8)
ERYTHROCYTE [DISTWIDTH] IN BLOOD BY AUTOMATED COUNT: 25.5 % (ref 11.5–14.5)
EST. GFR  (NO RACE VARIABLE): >60 ML/MIN/1.73 M^2
GLUCOSE SERPL-MCNC: 114 MG/DL (ref 70–110)
HCT VFR BLD AUTO: 27.1 % (ref 40–54)
HGB BLD-MCNC: 7.9 G/DL (ref 14–18)
IMM GRANULOCYTES # BLD AUTO: 0.03 K/UL (ref 0–0.04)
IMM GRANULOCYTES NFR BLD AUTO: 0.3 % (ref 0–0.5)
INR PPP: 2.1 (ref 0.8–1.2)
LDH SERPL L TO P-CCNC: 297 U/L (ref 110–260)
LYMPHOCYTES # BLD AUTO: 1.5 K/UL (ref 1–4.8)
LYMPHOCYTES NFR BLD: 15.2 % (ref 18–48)
MAGNESIUM SERPL-MCNC: 1.8 MG/DL (ref 1.6–2.6)
MCH RBC QN AUTO: 18.9 PG (ref 27–31)
MCHC RBC AUTO-ENTMCNC: 29.2 G/DL (ref 32–36)
MCV RBC AUTO: 65 FL (ref 82–98)
MONOCYTES # BLD AUTO: 0.8 K/UL (ref 0.3–1)
MONOCYTES NFR BLD: 7.7 % (ref 4–15)
NEUTROPHILS # BLD AUTO: 7.5 K/UL (ref 1.8–7.7)
NEUTROPHILS NFR BLD: 75.7 % (ref 38–73)
NRBC BLD-RTO: 0 /100 WBC
PHOSPHATE SERPL-MCNC: 4.2 MG/DL (ref 2.7–4.5)
PLATELET # BLD AUTO: 542 K/UL (ref 150–450)
PMV BLD AUTO: 8.9 FL (ref 9.2–12.9)
POCT GLUCOSE: 100 MG/DL (ref 70–110)
POCT GLUCOSE: 102 MG/DL (ref 70–110)
POCT GLUCOSE: 157 MG/DL (ref 70–110)
POCT GLUCOSE: 213 MG/DL (ref 70–110)
POCT GLUCOSE: 78 MG/DL (ref 70–110)
POTASSIUM SERPL-SCNC: 4.3 MMOL/L (ref 3.5–5.1)
PROTHROMBIN TIME: 22.2 SEC (ref 9–12.5)
RBC # BLD AUTO: 4.17 M/UL (ref 4.6–6.2)
SODIUM SERPL-SCNC: 131 MMOL/L (ref 136–145)
WBC # BLD AUTO: 9.89 K/UL (ref 3.9–12.7)

## 2024-09-08 PROCEDURE — 05HC33Z INSERTION OF INFUSION DEVICE INTO LEFT BASILIC VEIN, PERCUTANEOUS APPROACH: ICD-10-PCS | Performed by: STUDENT IN AN ORGANIZED HEALTH CARE EDUCATION/TRAINING PROGRAM

## 2024-09-08 PROCEDURE — 80048 BASIC METABOLIC PNL TOTAL CA: CPT | Performed by: STUDENT IN AN ORGANIZED HEALTH CARE EDUCATION/TRAINING PROGRAM

## 2024-09-08 PROCEDURE — 63600175 PHARM REV CODE 636 W HCPCS: Performed by: STUDENT IN AN ORGANIZED HEALTH CARE EDUCATION/TRAINING PROGRAM

## 2024-09-08 PROCEDURE — 85730 THROMBOPLASTIN TIME PARTIAL: CPT | Performed by: STUDENT IN AN ORGANIZED HEALTH CARE EDUCATION/TRAINING PROGRAM

## 2024-09-08 PROCEDURE — 25000003 PHARM REV CODE 250: Performed by: STUDENT IN AN ORGANIZED HEALTH CARE EDUCATION/TRAINING PROGRAM

## 2024-09-08 PROCEDURE — 25000003 PHARM REV CODE 250: Performed by: INTERNAL MEDICINE

## 2024-09-08 PROCEDURE — 99222 1ST HOSP IP/OBS MODERATE 55: CPT | Mod: ,,, | Performed by: STUDENT IN AN ORGANIZED HEALTH CARE EDUCATION/TRAINING PROGRAM

## 2024-09-08 PROCEDURE — 84100 ASSAY OF PHOSPHORUS: CPT | Performed by: STUDENT IN AN ORGANIZED HEALTH CARE EDUCATION/TRAINING PROGRAM

## 2024-09-08 PROCEDURE — 63600175 PHARM REV CODE 636 W HCPCS: Performed by: INTERNAL MEDICINE

## 2024-09-08 PROCEDURE — 63600175 PHARM REV CODE 636 W HCPCS: Performed by: PHYSICIAN ASSISTANT

## 2024-09-08 PROCEDURE — 27000248 HC VAD-ADDITIONAL DAY

## 2024-09-08 PROCEDURE — 85610 PROTHROMBIN TIME: CPT | Performed by: STUDENT IN AN ORGANIZED HEALTH CARE EDUCATION/TRAINING PROGRAM

## 2024-09-08 PROCEDURE — 36415 COLL VENOUS BLD VENIPUNCTURE: CPT | Performed by: STUDENT IN AN ORGANIZED HEALTH CARE EDUCATION/TRAINING PROGRAM

## 2024-09-08 PROCEDURE — 83735 ASSAY OF MAGNESIUM: CPT | Performed by: STUDENT IN AN ORGANIZED HEALTH CARE EDUCATION/TRAINING PROGRAM

## 2024-09-08 PROCEDURE — 83615 LACTATE (LD) (LDH) ENZYME: CPT | Performed by: STUDENT IN AN ORGANIZED HEALTH CARE EDUCATION/TRAINING PROGRAM

## 2024-09-08 PROCEDURE — 99233 SBSQ HOSP IP/OBS HIGH 50: CPT | Mod: 95,,, | Performed by: NURSE PRACTITIONER

## 2024-09-08 PROCEDURE — C1751 CATH, INF, PER/CENT/MIDLINE: HCPCS

## 2024-09-08 PROCEDURE — 05HM33Z INSERTION OF INFUSION DEVICE INTO RIGHT INTERNAL JUGULAR VEIN, PERCUTANEOUS APPROACH: ICD-10-PCS | Performed by: STUDENT IN AN ORGANIZED HEALTH CARE EDUCATION/TRAINING PROGRAM

## 2024-09-08 PROCEDURE — 36573 INSJ PICC RS&I 5 YR+: CPT

## 2024-09-08 PROCEDURE — 99232 SBSQ HOSP IP/OBS MODERATE 35: CPT | Mod: ,,,

## 2024-09-08 PROCEDURE — 25000003 PHARM REV CODE 250

## 2024-09-08 PROCEDURE — 25000003 PHARM REV CODE 250: Performed by: NURSE PRACTITIONER

## 2024-09-08 PROCEDURE — 85025 COMPLETE CBC W/AUTO DIFF WBC: CPT | Performed by: STUDENT IN AN ORGANIZED HEALTH CARE EDUCATION/TRAINING PROGRAM

## 2024-09-08 PROCEDURE — 76937 US GUIDE VASCULAR ACCESS: CPT

## 2024-09-08 PROCEDURE — 0JH63XZ INSERTION OF TUNNELED VASCULAR ACCESS DEVICE INTO CHEST SUBCUTANEOUS TISSUE AND FASCIA, PERCUTANEOUS APPROACH: ICD-10-PCS | Performed by: STUDENT IN AN ORGANIZED HEALTH CARE EDUCATION/TRAINING PROGRAM

## 2024-09-08 PROCEDURE — 25000003 PHARM REV CODE 250: Performed by: PHYSICIAN ASSISTANT

## 2024-09-08 PROCEDURE — 93750 INTERROGATION VAD IN PERSON: CPT | Mod: ,,, | Performed by: INTERNAL MEDICINE

## 2024-09-08 PROCEDURE — 20600001 HC STEP DOWN PRIVATE ROOM

## 2024-09-08 RX ORDER — FENTANYL CITRATE 50 UG/ML
INJECTION, SOLUTION INTRAMUSCULAR; INTRAVENOUS
Status: COMPLETED | OUTPATIENT
Start: 2024-09-08 | End: 2024-09-08

## 2024-09-08 RX ORDER — HYDRALAZINE HYDROCHLORIDE 25 MG/1
25 TABLET, FILM COATED ORAL ONCE
Status: COMPLETED | OUTPATIENT
Start: 2024-09-08 | End: 2024-09-08

## 2024-09-08 RX ORDER — INSULIN GLARGINE 100 [IU]/ML
12 INJECTION, SOLUTION SUBCUTANEOUS 2 TIMES DAILY
Status: DISCONTINUED | OUTPATIENT
Start: 2024-09-09 | End: 2024-09-10 | Stop reason: HOSPADM

## 2024-09-08 RX ORDER — INSULIN GLARGINE 100 [IU]/ML
10 INJECTION, SOLUTION SUBCUTANEOUS ONCE
Status: COMPLETED | OUTPATIENT
Start: 2024-09-08 | End: 2024-09-08

## 2024-09-08 RX ORDER — FUROSEMIDE 80 MG/1
80 TABLET ORAL DAILY
Status: DISCONTINUED | OUTPATIENT
Start: 2024-09-08 | End: 2024-09-10 | Stop reason: HOSPADM

## 2024-09-08 RX ADMIN — POTASSIUM CHLORIDE 40 MEQ: 1500 TABLET, EXTENDED RELEASE ORAL at 08:09

## 2024-09-08 RX ADMIN — INSULIN ASPART 8 UNITS: 100 INJECTION, SOLUTION INTRAVENOUS; SUBCUTANEOUS at 08:09

## 2024-09-08 RX ADMIN — GABAPENTIN 400 MG: 400 CAPSULE ORAL at 08:09

## 2024-09-08 RX ADMIN — INSULIN ASPART 8 UNITS: 100 INJECTION, SOLUTION INTRAVENOUS; SUBCUTANEOUS at 04:09

## 2024-09-08 RX ADMIN — CYCLOBENZAPRINE HYDROCHLORIDE 5 MG: 5 TABLET, FILM COATED ORAL at 08:09

## 2024-09-08 RX ADMIN — GABAPENTIN 100 MG: 100 CAPSULE ORAL at 08:09

## 2024-09-08 RX ADMIN — AMLODIPINE BESYLATE 5 MG: 5 TABLET ORAL at 08:09

## 2024-09-08 RX ADMIN — GABAPENTIN 100 MG: 100 CAPSULE ORAL at 12:09

## 2024-09-08 RX ADMIN — FUROSEMIDE 80 MG: 80 TABLET ORAL at 09:09

## 2024-09-08 RX ADMIN — AMIODARONE HYDROCHLORIDE 400 MG: 200 TABLET ORAL at 08:09

## 2024-09-08 RX ADMIN — INSULIN GLARGINE 12 UNITS: 100 INJECTION, SOLUTION SUBCUTANEOUS at 08:09

## 2024-09-08 RX ADMIN — FENTANYL CITRATE 25 MCG: 0.05 INJECTION, SOLUTION INTRAMUSCULAR; INTRAVENOUS at 02:09

## 2024-09-08 RX ADMIN — CEFAZOLIN 2 G: 2 INJECTION, POWDER, FOR SOLUTION INTRAMUSCULAR; INTRAVENOUS at 02:09

## 2024-09-08 RX ADMIN — WARFARIN SODIUM 1 MG: 1 TABLET ORAL at 04:09

## 2024-09-08 RX ADMIN — INSULIN ASPART 4 UNITS: 100 INJECTION, SOLUTION INTRAVENOUS; SUBCUTANEOUS at 12:09

## 2024-09-08 RX ADMIN — CEFAZOLIN 2 G: 2 INJECTION, POWDER, FOR SOLUTION INTRAMUSCULAR; INTRAVENOUS at 09:09

## 2024-09-08 RX ADMIN — ASPIRIN 81 MG: 81 TABLET, COATED ORAL at 08:09

## 2024-09-08 RX ADMIN — INSULIN GLARGINE 10 UNITS: 100 INJECTION, SOLUTION SUBCUTANEOUS at 09:09

## 2024-09-08 RX ADMIN — LEVETIRACETAM 1500 MG: 500 TABLET, FILM COATED ORAL at 08:09

## 2024-09-08 RX ADMIN — INSULIN ASPART 8 UNITS: 100 INJECTION, SOLUTION INTRAVENOUS; SUBCUTANEOUS at 12:09

## 2024-09-08 RX ADMIN — DULOXETINE HYDROCHLORIDE 30 MG: 30 CAPSULE, DELAYED RELEASE ORAL at 08:09

## 2024-09-08 RX ADMIN — ATORVASTATIN CALCIUM 40 MG: 20 TABLET, FILM COATED ORAL at 08:09

## 2024-09-08 RX ADMIN — PANTOPRAZOLE SODIUM 40 MG: 40 TABLET, DELAYED RELEASE ORAL at 08:09

## 2024-09-08 RX ADMIN — OXYCODONE HYDROCHLORIDE 10 MG: 10 TABLET, FILM COATED, EXTENDED RELEASE ORAL at 04:09

## 2024-09-08 RX ADMIN — HYDRALAZINE HYDROCHLORIDE 25 MG: 25 TABLET ORAL at 04:09

## 2024-09-08 RX ADMIN — Medication 400 MG: at 08:09

## 2024-09-08 RX ADMIN — FUROSEMIDE 10 MG/HR: 10 INJECTION, SOLUTION INTRAMUSCULAR; INTRAVENOUS at 05:09

## 2024-09-08 RX ADMIN — FLUCONAZOLE 400 MG: 2 INJECTION, SOLUTION INTRAVENOUS at 09:09

## 2024-09-08 RX ADMIN — INSULIN ASPART 2 UNITS: 100 INJECTION, SOLUTION INTRAVENOUS; SUBCUTANEOUS at 08:09

## 2024-09-08 RX ADMIN — EPLERENONE 25 MG: 25 TABLET, FILM COATED ORAL at 08:09

## 2024-09-08 RX ADMIN — CEFAZOLIN 2 G: 2 INJECTION, POWDER, FOR SOLUTION INTRAMUSCULAR; INTRAVENOUS at 04:09

## 2024-09-08 RX ADMIN — POTASSIUM CHLORIDE 40 MEQ: 1500 TABLET, EXTENDED RELEASE ORAL at 02:09

## 2024-09-08 NOTE — NURSING
RT Chest wall IJ site bleeding and dressing soaked. Called made to IR. Orders to continue to hold light pressure to site and apply quick clot to site. Quick clot applied to site, and will continue to monitor. Once bleeding stops, quick clot will be removed. Will continue to monitor.

## 2024-09-08 NOTE — CONSULTS
PICC line attempted on left side. Unable to place central. Veins on Right side not adequate size for PICC placement. NP Amy notified.

## 2024-09-08 NOTE — PROCEDURES
"  Pre Op Diagnosis: CHF  Post Op Diagnosis: Same    Procedure: Tunneled PICC    Procedure performed by: Wong    Written Informed Consent Obtained: Yes  Specimen Removed: NO  Estimated Blood Loss: Minimal    Findings:   Successful placement of right IJ tunneled PICC. 21cm in length. Ready for immediate use.    Patient tolerated procedure well.    Edaurdo Back MD ([facility])  Interventional Radiology  [phone]      "

## 2024-09-08 NOTE — SUBJECTIVE & OBJECTIVE
"Interval HPI:   No acute events overnight. Patient in room 319/319 A. Blood glucose  variable . BG at, above, and below goal on current insulin regimen (SSI, prandial, and basal insulin ). Steroid use- None.    Renal function- Normal   Lab Results   Component Value Date    CREATININE 1.3 2024     Vasopressors-  None     Endocrine will continue to follow and manage insulin orders inpatient.     Diet diabetic Cardiac (Low Na/Chol); 2000 Calorie; Fluid - 1500mL; Standard Tray     Eatin%  Nausea: No  Hypoglycemia and intervention: Yes; BG 65 at 1553 and confirmed at 1556 with BG of 65 despite normal appetite and consuming 100% of meals. Novolog held and 16g of oral dextrose administered along with orange juice per RN. Repeat  at 1937 and 259 at 1952.   Fever: No  TPN and/or TF: No  If yes, type of TF/TPN and rate: N/A    BP (!) 86/0 (BP Location: Left arm, Patient Position: Lying)   Pulse 78   Temp 97.7 °F (36.5 °C) (Oral)   Resp 18   Ht 6' 3" (1.905 m)   Wt 70.2 kg (154 lb 12.2 oz)   SpO2 99%   BMI 19.34 kg/m²     Labs Reviewed and Include    Recent Labs   Lab 24  0338   *   CALCIUM 9.8   *   K 4.3   CO2 24   CL 95   BUN 26*   CREATININE 1.3     Lab Results   Component Value Date    WBC 9.89 2024    HGB 7.9 (L) 2024    HCT 27.1 (L) 2024    MCV 65 (L) 2024     (H) 2024     No results for input(s): "TSH", "FREET4" in the last 168 hours.  Lab Results   Component Value Date    HGBA1C 10.3 (H) 2024       Nutritional status:   Body mass index is 19.34 kg/m².  Lab Results   Component Value Date    ALBUMIN 2.6 (L) 2024    ALBUMIN 2.6 (L) 2024    ALBUMIN 2.6 (L) 2024     Lab Results   Component Value Date    PREALBUMIN 10 (L) 2024    PREALBUMIN 10 (L) 2024    PREALBUMIN 10 (L) 2024       Estimated Creatinine Clearance: 75.8 mL/min (based on SCr of 1.3 mg/dL).    Accu-Checks  Recent Labs     24  1046 " 09/06/24  1559 09/06/24  2006 09/07/24  0639 09/07/24  1036 09/07/24  1553 09/07/24  1556 09/07/24  1937 09/07/24 1952 09/08/24  0658   POCTGLUCOSE 193* 169* 220* 206* 233* 65* 65* 159* 259* 157*       Current Medications and/or Treatments Impacting Glycemic Control  Immunotherapy:    Immunosuppressants       None          Steroids:   Hormones (From admission, onward)      None          Pressors:    Autonomic Drugs (From admission, onward)      None          Hyperglycemia/Diabetes Medications:   Antihyperglycemics (From admission, onward)      Start     Stop Route Frequency Ordered    09/08/24 0715  insulin aspart U-100 pen 8 Units         -- SubQ 3 times daily with meals 09/07/24 1848    09/07/24 0900  insulin glargine U-100 (Lantus) pen 12 Units         -- SubQ 2 times daily 09/07/24 0809    08/25/24 1506  insulin aspart U-100 pen 0-10 Units         -- SubQ Before meals & nightly PRN 08/25/24 1407

## 2024-09-08 NOTE — ASSESSMENT & PLAN NOTE
-HeartMate 3 Implanted  6/29/2022  as DT  -HTS Primary  -cont coumadin, Goal INR 2.0-3.0. Supratherapeutic this AM. Will not bridge.   -Antiplatelets ASA 81 mg  -LDH is stable overall today. Will continue to monitor daily.  -Speed set at 5100, LSL 4700 rpm  -Interrogation notable for  power cable disconnected, low voltage advisory, PI events  -Not listed for OHTx- advised pt he would not be candidate for OHTx (noncompliance/poor f/up)   -UDS negative  -attempted to call partner Robyn, unfortunately goes to        Procedure: Device Interrogation Including analysis of device parameters  Current Settings: Ventricular Assist Device  Review of device function is stable/unstable stable        9/8/2024     7:23 AM 9/8/2024     3:35 AM 9/7/2024    11:41 PM 9/7/2024     7:25 PM 9/7/2024     3:36 PM 9/7/2024    11:23 AM 9/7/2024     7:32 AM   TXP LVAD INTERROGATIONS   Type HeartMate3 HeartMate3 HeartMate3 HeartMate3 HeartMate3 HeartMate3 HeartMate3   Flow 3.9 3.8 4 3.7 4 3.5 3.6   Speed 5100 5100 5100 5150 5100 5100 5100   PI 6.1 6.9 5.9 7.1 6.2 6 6.2   Power (Serna) 3.6 3.5 3.6 3.5 3.5 3.6 3.6   LSL 4700   4700   4700   Pulsatility   Intermittent pulse Intermittent pulse

## 2024-09-08 NOTE — PLAN OF CARE
[facility]   [facility] Transplant/[facility]AD [facility]   [address] [place] [address]   [phone] after hours          [phone] fax     VAD HOME  HEALTH ORDERS      Admit to Home Health    Diagnosis:   Patient Active Problem List   Diagnosis    Acute on chronic systolic CHF (congestive heart failure)    Anxiety disorder, unspecified    Anemia of chronic disease    Ecstasy abuse    Cannabis use disorder, severe, dependence    CHF (NYHA class IV, ACC/AHA stage D)    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    Chronic combined systolic and diastolic heart failure    Insomnia    Tachycardia    Palliative care encounter    Familial dilated cardiomyopathy    LVAD (left ventricular assist device) present    On mechanically assisted ventilation    Hyponatremia    Seizure-like activity    Temporal lobe seizure    Right heart failure secondary to left heart failure-post LVAD surgery    History of substance abuse    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)    Pleural effusion    Noncompliance with medication regimen    Moderate malnutrition    Receiving inotropic medication    Chronic anticoagulation    Type 2 diabetes mellitus with hyperosmolar hyperglycemic state (HHS)    HTN (hypertension)    Acute decompensated heart failure    Type 2 diabetes mellitus with hyperglycemia, with long-term current use of insulin    Muscle cramping    Goals of care, counseling/discussion    Advance care planning    Hypokalemia    Bilateral back pain    Supratherapeutic INR    Subcutaneous nodule of breast    Polysubstance abuse    Atrial flutter    Severe sepsis    Pain    Dyspnea    Advanced care planning/counseling discussion    Pyelonephritis    Pulmonary nodules    Fungemia       Patient is homebound due to:  NYHA Class IV HF. S/P LVAD placement.     Diet: Low Fat, Low cholesterol, 2Gm Na, Coumadin restrictions.    Acitivities: No Swimming, bathing,  vacuuming, contact sports.    Fresh implants= Sternal Precautions    Nursing:   SN to complete comprehensive assessment including routine vital signs. Instruct on disease process and s/s of complications to report to MD. Review/verify medication list sent home with the patient at time of discharge  and instruct patient/caregiver as needed. Frequency may be adjusted depending on start of care date.    **LVAD driveline exit site dressing change is to be completed per LVAD patient/caregiver only**.    Notify MD if:  SBP > 120 or < 80;   MAP > 80 or < 65;   HR > 120 or < 60;   Temp > 101;   Weight gain >3lbs in 1 day or 5lbs in 1 week.    Outpatient Antibiotic Therapy Plan:     Please send referral to [facility].     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     4) Outpatient Weekly Labs:     Order the following labs to be drawn on Mondays:   CBC  CMP   CRP  INR(Fax to coumadin [phone]. )    5) Fax Lab Results to Infectious Diseases Provider: Kavya Carrion      [facility] Fax Number: [phone]     6) Outpatient Infectious Diseases Follow-up     Follow-up appointment will be arranged by the ID clinic and will be found in the patient's appointments tab.     Prior to discharge, please ensure the patient's follow-up has been scheduled.    If there is still no follow-up scheduled prior to discharge, please send an [employer] message to [facility] [employer] or Call Infectious Diseases Dept    HOME INFUSION THERAPY:   SN to perform Infusion Therapy/Central Line Care.  Review Central Line Care & Central Line Flush with patient.      PICC/Central line  care:  Scrub the Hub: Prior to accessing the line, always perform a 30 second alcohol scrub  Each lumen of the central line is to be flushed at least daily with 10 mL Normal Saline and 3 mL Heparin flush (100 units/mL)  Skilled Nurse (SN) may draw blood from IV access  Blood Draw Procedure:   - Aspirate at least 5 mL of blood   - Discard   - Obtain specimen   - Change posiflow cap   - Flush with 20 mL Normal Saline followed by a                 3-5 mL Heparin flush (100 units/mL)  :   - Sterile dressing changes are done weekly and as needed.   - Use chlor-hexadine scrub to cleanse site, apply Biopatch to insertion site,       apply securement device dressing   - Posi-flow caps are changed weekly and after EVERY lab draw.   - If sterile gauze is under dressing to control oozing,                 dressing change must be performed every 24 hours until gauze is not needed.    Send initial Home Health orders to [facility] attending physician on call.  Send follow up questions to VAD clinic MD [phone] or [phone].

## 2024-09-08 NOTE — PROGRESS NOTES
09/07/2024  John Alaniz    Current provider:  Natalya Villatoro MD    Device interrogation:      9/7/2024     7:25 PM 9/7/2024     3:36 PM 9/7/2024    11:23 AM 9/7/2024     7:32 AM 9/7/2024     3:58 AM 9/6/2024    11:26 PM 9/6/2024     7:10 PM   TXP LVAD INTERROGATIONS   Type HeartMate3 HeartMate3 HeartMate3 HeartMate3 HeartMate3 HeartMate3 HeartMate3   Flow 3.7 4 3.5 3.6 3.7 3.9 3.9   Speed 5150 5100 5100 5100 5100 5100 5150   PI 7.1 6.2 6 6.2 6.6 6.4 6.3   Power (Serna) 3.5 3.5 3.6 3.6 3.5 3.6 3.6   LSL 4700   4700 4700 4700 4700   Pulsatility Intermittent pulse    Intermittent pulse Intermittent pulse Intermittent pulse          Rounded on Kevan Queen to ensure all mechanical assist device settings (IABP or VAD) were appropriate and all parameters were within limits.  I was able to ensure all back up equipment was present, the staff had no issues, and the Perfusion Department daily rounding was complete.      For implantable VADs: Interrogation of Ventricular assist device was performed with analysis of device parameters and review of device function. I have personally reviewed the interrogation findings and agree with findings as stated.     In emergency, the nursing units have been notified to contact the perfusion department either by:  Calling [phone] from 630am to 4pm Mon thru Fri, utilizing the On-Call Finder functionality of Epic and searching for Perfusion, or by contacting the hospital  from 4pm to 630am and on weekends and asking to speak with the perfusionist on call.    8:15 PM

## 2024-09-08 NOTE — ASSESSMENT & PLAN NOTE
-CT chest/abdomen and pelvis w/ contrast w/ pyelonephritis and renal abscess   -repeat CT spine with contrast +visualization of kidney read is stable, without worsening pyelo or abscess, no evidence of discitis    Plan:  -F/up cultures- +ve for staph, neg MRSA pcr, and yeast   -Repeat neg so will replace PICC.   -ID following-Will place  orders.   -C/s to Optho for eval of endopthalmitis in setting of yeast in blood   -Removed PICC on 8/28  -Driveline infection vs pyelo vs PICC related  - TTE negative for endocarditis

## 2024-09-08 NOTE — PLAN OF CARE
Plan of care discussed with patient. Patient is free of fall/trauma/injury.  LVAD DP and numbers WNL, smooth LVAD hum.  Denies CP, SOB, or pain/discomfort. Currently in IR for PICC placement.  All questions addressed. Will continue to monitor

## 2024-09-08 NOTE — SUBJECTIVE & OBJECTIVE
Interval History: No complaints this AM or events overnight. Plan to replace PICC and potential let go tomorrow pending insurance approval of abx.         Scheduled Meds:   [START ON 9/9/2024] amiodarone  200 mg Oral Daily    amiodarone  400 mg Oral BID    amLODIPine  5 mg Oral Daily    aspirin  81 mg Oral Daily    atorvastatin  40 mg Oral Daily    ceFAZolin (Ancef) IV (PEDS and ADULTS)  2 g Intravenous Q8H    cyclobenzaprine  5 mg Oral BID    DULoxetine  30 mg Oral Daily    eplerenone  25 mg Oral Daily    fluconazole (DIFLUCAN) IV (PEDS and ADULTS)  400 mg Intravenous Daily    furosemide  80 mg Oral Daily    gabapentin  100 mg Oral BID    gabapentin  400 mg Oral QHS    insulin aspart U-100  8 Units Subcutaneous TIDWM    insulin glargine U-100  12 Units Subcutaneous BID    levETIRAcetam  1,500 mg Oral BID    LIDOcaine  2 patch Transdermal Q24H    magnesium oxide  400 mg Oral Daily    nicotine  1 patch Transdermal Daily    pantoprazole  40 mg Oral Daily    polyethylene glycol  17 g Oral Daily    potassium chloride  40 mEq Oral TID    warfarin  1 mg Oral Daily     PRN Meds:  Current Facility-Administered Medications:     acetaminophen, 650 mg, Oral, Q6H PRN    dextrose 10%, 12.5 g, Intravenous, PRN    dextrose 10%, 25 g, Intravenous, PRN    glucagon (human recombinant), 1 mg, Intramuscular, PRN    glucose, 16 g, Oral, PRN    glucose, 24 g, Oral, PRN    insulin aspart U-100, 0-10 Units, Subcutaneous, QID (AC + HS) PRN    ondansetron, 4 mg, Oral, Q8H PRN    oxyCODONE, 10 mg, Oral, Daily PRN    Review of patient's allergies indicates:   Allergen Reactions    Torsemide Hives     Objective:     Vital Signs (Most Recent):  Temp: 97.7 °F (36.5 °C) (09/08/24 0723)  Pulse: 78 (09/08/24 0730)  Resp: 18 (09/08/24 0723)  BP: (!) 86/0 (09/08/24 0723)  SpO2: 99 % (09/08/24 0723) Vital Signs (24h Range):  Temp:  [97.6 °F (36.4 °C)-98.6 °F (37 °C)] 97.7 °F (36.5 °C)  Pulse:  [78-86] 78  Resp:  [14-19] 18  SpO2:  [92 %-99 %] 99 %  BP:  ()/(0-87) 86/0     Patient Vitals for the past 72 hrs (Last 3 readings):   Weight   09/07/24 0614 70.2 kg (154 lb 12.2 oz)         Body mass index is 19.34 kg/m².      Intake/Output Summary (Last 24 hours) at 9/8/2024 0924  Last data filed at 9/8/2024 0459  Gross per 24 hour   Intake 1022 ml   Output 3490 ml   Net -2468 ml          Physical Exam  Constitutional:       General: He is not in acute distress.     Appearance: He is normal weight.   HENT:      Head: Normocephalic and atraumatic.      Right Ear: External ear normal.      Left Ear: External ear normal.      Nose: Nose normal.      Mouth/Throat:      Pharynx: Oropharynx is clear.   Cardiovascular:      Comments: Normal VAD hum, JVD present but improving   Pulmonary:      Effort: Pulmonary effort is normal.   Abdominal:      General: Abdomen is flat. Bowel sounds are normal. There is no distension (Distended but soft.).      Palpations: Abdomen is soft. There is no mass.      Tenderness: There is no abdominal tenderness. There is right CVA tenderness.   Musculoskeletal:      Cervical back: Normal range of motion.      Right lower leg: No edema.      Left lower leg: No edema.      Comments: Cont b/l back pain- upper spine and b/l paraspinal/upper back muscular pain.    Skin:     General: Skin is warm.      Comments: Chest wall on the right side with gynecomastia, no overlying skin changes, no tenderness at the site however palpable mass versus nodule present.   Neurological:      Mental Status: He is alert. Mental status is at baseline.            Significant Labs:  CBC:  Recent Labs   Lab 09/06/24  0525 09/07/24  0511 09/08/24  0338   WBC 9.78 9.53 9.89   RBC 4.11* 4.05* 4.17*   HGB 8.0* 7.8* 7.9*   HCT 27.8* 27.8* 27.1*   * 522* 542*   MCV 68* 69* 65*   MCH 19.5* 19.3* 18.9*   MCHC 28.8* 28.1* 29.2*     BNP:  Recent Labs   Lab 09/02/24  0205 09/04/24  0217 09/06/24  0525   BNP 1,053* 1,170* 1,022*     CMP:  Recent Labs   Lab 09/02/24  0209  09/03/24  0334 09/04/24  0217 09/04/24  0218 09/06/24 0526 09/07/24 0511 09/08/24 0338   GLU 41*   < >  --    < > 131* 171* 114*   CALCIUM 9.1   < >  --    < > 9.5 9.8 9.8   ALBUMIN 2.6*  --  2.6*  --  2.6*  --   --    PROT 8.5*  --  8.5*  --  9.0*  --   --    *   < >  --    < > 130* 131* 131*   K 3.4*   < >  --    < > 4.6 4.7 4.3   CO2 26   < >  --    < > 26 22* 24   CL 93*   < >  --    < > 93* 96 95   BUN 20   < >  --    < > 20 23* 26*   CREATININE 1.2   < >  --    < > 1.2 1.2 1.3   ALKPHOS 230*  --  237*  --  267*  --   --    ALT 8*  --  8*  --  5*  --   --    AST 31  --  30  --  38  --   --    BILITOT 1.9*  --  1.7*  --  2.0*  --   --     < > = values in this interval not displayed.      Coagulation:   Recent Labs   Lab 09/06/24 0526 09/07/24 0511 09/08/24 0338   INR 3.1* 2.1* 2.1*   APTT 39.8* 40.4* 39.3*     LDH:  Recent Labs   Lab 09/06/24 0526 09/07/24 0511 09/08/24 0338   * 524* 297*     Microbiology:  Microbiology Results (last 7 days)       Procedure Component Value Units Date/Time    Blood culture [[id number]]  (Abnormal) Collected: 09/01/24 0939    Order Status: Completed Specimen: Blood Updated: 09/08/24 0747     Blood Culture, Routine Gram stain dudley bottle: yeast      Results called to and read back by: Rufino 09/03/2024  06:15      CANDIDA PARAPSILOSIS  For susceptibility see order #[id number]  ID consult required at [facility].[place] and [facility].      Blood culture [[id number]]  (Abnormal) Collected: 09/01/24 0939    Order Status: Completed Specimen: Blood Updated: 09/08/24 0747     Blood Culture, Routine Gram stain aer bottle: yeast      Gram stain dudley bottle: yeast      Results called to and read back by: Rufino 09/03/2024  01:45      YEAST   Identification pending  ID consult required at [facility]a,[place] and [place] locations.  For susceptibility see order #[id number]      Blood culture [[id number]]  (Abnormal) Collected: 08/28/24 2226    Order Status:  Completed Specimen: Blood Updated: 09/08/24 0747     Blood Culture, Routine Gram stain aer bottle: yeast      Results called to and read back by:Lorri Riggs RN 08/30/2024  17:10      YEAST   Identification pending  For susceptibility see order #[id number]  ID consult required at [facility].      Narrative:      Code 45410  Draw sample # 2 from separate site    Blood culture [[id number]]  (Abnormal) Collected: 08/28/24 2226    Order Status: Completed Specimen: Blood Updated: 09/08/24 0747     Blood Culture, Routine Gram stain aer bottle: yeast      Gram stain dudley bottle: yeast      Positive results previously called 08/30/2024      YEAST   Identification pending  For susceptibility see order #[id number]  ID consult required at [facility].      Narrative:      Code 75642  Draw sample # 2 from separate site    Blood culture [[id number]]  (Abnormal) Collected: 08/27/24 1751    Order Status: Completed Specimen: Blood Updated: 09/08/24 0747     Blood Culture, Routine Gram stain dudley bottle: Gram positive cocci in clusters resembling Staph      Results called to and read back by: Lorri Riggs RN  08/29/2024   13:50      Gram stain aer bottle: yeast      Results called to and read back by:Lorri Riggs RN 08/30/2024      STAPHYLOCOCCUS AUREUS  ID consult required at [facility].  For susceptibility see order #[id number]        YEAST   Identification pending  For susceptibility see order #[id number]  ID consult required at [facility].      Narrative:      Blood culture # 2 different location.    Blood culture [[id number]] Collected: 09/04/24 0217    Order Status: Completed Specimen: Blood Updated: 09/08/24 0612     Blood Culture, Routine No Growth to date      No Growth to date      No Growth to date      No Growth to date      No Growth to date    Blood culture [[id number]]  (Abnormal)  (Susceptibility)  Collected: 08/30/24 1152    Order Status: Completed Specimen: Blood Updated: 09/06/24 1356     Blood Culture, Routine Gram stain dudley bottle: yeast      Results called to and read back by: Rufino 09/01/2024  02:03      CANDIDA PARAPSILOSIS  ID consult required at [facility] and [facility] locations.  ID consult required at [facility] and [facility].      Narrative:      Lft AC    Blood culture [[id number]] Collected: 08/30/24 1152    Order Status: Completed Specimen: Blood Updated: 09/04/24 1412     Blood Culture, Routine No growth after 5 days.    Narrative:      Rt AC    Culture, Anaerobe [[id number]] Collected: 08/27/24 1446    Order Status: Completed Specimen: Skin from Abdomen Updated: 09/02/24 0936     Anaerobic Culture No anaerobes isolated    Narrative:      Driveline site            I have reviewed all pertinent labs within the past 24 hours.    Estimated Creatinine Clearance: 75.8 mL/min (based on SCr of 1.3 mg/dL).    Diagnostic Results:  I have reviewed and interpreted all pertinent imaging results/findings within the past 24 hours.

## 2024-09-08 NOTE — PROGRESS NOTES
[facility] - Cardiology Stepdown  Heart Transplant  Progress Note    Patient Name: Kevan Queen  MRN: [id number]  Admission Date: 8/25/2024  Hospital Length of Stay: 14 days  Attending Physician: Natalya Villatoro MD  Primary Care Provider: Rosio Armendariz FNP  Principal Problem:LVAD (left ventricular assist device) present    Subjective:   Interval History: No complaints this AM or events overnight. Plan to replace PICC and potential let go tomorrow pending insurance approval of abx.         Scheduled Meds:   [START ON 9/9/2024] amiodarone  200 mg Oral Daily    amiodarone  400 mg Oral BID    amLODIPine  5 mg Oral Daily    aspirin  81 mg Oral Daily    atorvastatin  40 mg Oral Daily    ceFAZolin (Ancef) IV (PEDS and ADULTS)  2 g Intravenous Q8H    cyclobenzaprine  5 mg Oral BID    DULoxetine  30 mg Oral Daily    eplerenone  25 mg Oral Daily    fluconazole (DIFLUCAN) IV (PEDS and ADULTS)  400 mg Intravenous Daily    furosemide  80 mg Oral Daily    gabapentin  100 mg Oral BID    gabapentin  400 mg Oral QHS    insulin aspart U-100  8 Units Subcutaneous TIDWM    insulin glargine U-100  12 Units Subcutaneous BID    levETIRAcetam  1,500 mg Oral BID    LIDOcaine  2 patch Transdermal Q24H    magnesium oxide  400 mg Oral Daily    nicotine  1 patch Transdermal Daily    pantoprazole  40 mg Oral Daily    polyethylene glycol  17 g Oral Daily    potassium chloride  40 mEq Oral TID    warfarin  1 mg Oral Daily     PRN Meds:  Current Facility-Administered Medications:     acetaminophen, 650 mg, Oral, Q6H PRN    dextrose 10%, 12.5 g, Intravenous, PRN    dextrose 10%, 25 g, Intravenous, PRN    glucagon (human recombinant), 1 mg, Intramuscular, PRN    glucose, 16 g, Oral, PRN    glucose, 24 g, Oral, PRN    insulin aspart U-100, 0-10 Units, Subcutaneous, QID (AC + HS) PRN    ondansetron, 4 mg, Oral, Q8H PRN    oxyCODONE, 10 mg, Oral, Daily PRN    Review of patient's allergies indicates:   Allergen Reactions    Torsemide Hives      Objective:     Vital Signs (Most Recent):  Temp: 97.7 °F (36.5 °C) (09/08/24 0723)  Pulse: 78 (09/08/24 0730)  Resp: 18 (09/08/24 0723)  BP: (!) 86/0 (09/08/24 0723)  SpO2: 99 % (09/08/24 0723) Vital Signs (24h Range):  Temp:  [97.6 °F (36.4 °C)-98.6 °F (37 °C)] 97.7 °F (36.5 °C)  Pulse:  [78-86] 78  Resp:  [14-19] 18  SpO2:  [92 %-99 %] 99 %  BP: ()/(0-87) 86/0     Patient Vitals for the past 72 hrs (Last 3 readings):   Weight   09/07/24 0614 70.2 kg (154 lb 12.2 oz)         Body mass index is 19.34 kg/m².      Intake/Output Summary (Last 24 hours) at 9/8/2024 0924  Last data filed at 9/8/2024 0459  Gross per 24 hour   Intake 1022 ml   Output 3490 ml   Net -2468 ml          Physical Exam  Constitutional:       General: He is not in acute distress.     Appearance: He is normal weight.   HENT:      Head: Normocephalic and atraumatic.      Right Ear: External ear normal.      Left Ear: External ear normal.      Nose: Nose normal.      Mouth/Throat:      Pharynx: Oropharynx is clear.   Cardiovascular:      Comments: Normal VAD hum, JVD present but improving   Pulmonary:      Effort: Pulmonary effort is normal.   Abdominal:      General: Abdomen is flat. Bowel sounds are normal. There is no distension (Distended but soft.).      Palpations: Abdomen is soft. There is no mass.      Tenderness: There is no abdominal tenderness. There is right CVA tenderness.   Musculoskeletal:      Cervical back: Normal range of motion.      Right lower leg: No edema.      Left lower leg: No edema.      Comments: Cont b/l back pain- upper spine and b/l paraspinal/upper back muscular pain.    Skin:     General: Skin is warm.      Comments: Chest wall on the right side with gynecomastia, no overlying skin changes, no tenderness at the site however palpable mass versus nodule present.   Neurological:      Mental Status: He is alert. Mental status is at baseline.            Significant Labs:  CBC:  Recent Labs   Lab 09/06/24  7371  09/07/24 0511 09/08/24 0338   WBC 9.78 9.53 9.89   RBC 4.11* 4.05* 4.17*   HGB 8.0* 7.8* 7.9*   HCT 27.8* 27.8* 27.1*   * 522* 542*   MCV 68* 69* 65*   MCH 19.5* 19.3* 18.9*   MCHC 28.8* 28.1* 29.2*     BNP:  Recent Labs   Lab 09/02/24 0205 09/04/24 0217 09/06/24 0525   BNP 1,053* 1,170* 1,022*     CMP:  Recent Labs   Lab 09/02/24 0205 09/03/24 0334 09/04/24 0217 09/04/24 0218 09/06/24 0526 09/07/24 0511 09/08/24 0338   GLU 41*   < >  --    < > 131* 171* 114*   CALCIUM 9.1   < >  --    < > 9.5 9.8 9.8   ALBUMIN 2.6*  --  2.6*  --  2.6*  --   --    PROT 8.5*  --  8.5*  --  9.0*  --   --    *   < >  --    < > 130* 131* 131*   K 3.4*   < >  --    < > 4.6 4.7 4.3   CO2 26   < >  --    < > 26 22* 24   CL 93*   < >  --    < > 93* 96 95   BUN 20   < >  --    < > 20 23* 26*   CREATININE 1.2   < >  --    < > 1.2 1.2 1.3   ALKPHOS 230*  --  237*  --  267*  --   --    ALT 8*  --  8*  --  5*  --   --    AST 31  --  30  --  38  --   --    BILITOT 1.9*  --  1.7*  --  2.0*  --   --     < > = values in this interval not displayed.      Coagulation:   Recent Labs   Lab 09/06/24 0526 09/07/24 0511 09/08/24 0338   INR 3.1* 2.1* 2.1*   APTT 39.8* 40.4* 39.3*     LDH:  Recent Labs   Lab 09/06/24 0526 09/07/24  0511 09/08/24  0338   * 524* 297*     Microbiology:  Microbiology Results (last 7 days)       Procedure Component Value Units Date/Time    Blood culture [[id number]]  (Abnormal) Collected: 09/01/24 0939    Order Status: Completed Specimen: Blood Updated: 09/08/24 0747     Blood Culture, Routine Gram stain dudley bottle: yeast      Results called to and read back by: Rufino 09/03/2024  06:15      CANDIDA PARAPSILOSIS  For susceptibility see order #[id number]  ID consult required at [facility].[place] and [place] locations.      Blood culture [[id number]]  (Abnormal) Collected: 09/01/24 0939    Order Status: Completed Specimen: Blood Updated: 09/08/24 0747     Blood Culture, Routine Gram stain aer  bottle: yeast      Gram stain dudley bottle: yeast      Results called to and read back by: Rufino 09/03/2024  01:45      YEAST   Identification pending  ID consult required at [facility] and [facility].  For susceptibility see order #[id number]      Blood culture [[id number]]  (Abnormal) Collected: 08/28/24 2226    Order Status: Completed Specimen: Blood Updated: 09/08/24 0747     Blood Culture, Routine Gram stain aer bottle: yeast      Results called to and read back by:Lorri Riggs RN 08/30/2024  17:10      YEAST   Identification pending  For susceptibility see order #[id number]  ID consult required at [facility] and [facility].      Narrative:      Code 55121  Draw sample # 2 from separate site    Blood culture [[id number]]  (Abnormal) Collected: 08/28/24 2226    Order Status: Completed Specimen: Blood Updated: 09/08/24 0747     Blood Culture, Routine Gram stain aer bottle: yeast      Gram stain dudley bottle: yeast      Positive results previously called 08/30/2024      YEAST   Identification pending  For susceptibility see order #[id number]  ID consult required at [facility] and [facility].      Narrative:      Code 78701  Draw sample # 2 from separate site    Blood culture [[id number]]  (Abnormal) Collected: 08/27/24 1751    Order Status: Completed Specimen: Blood Updated: 09/08/24 0747     Blood Culture, Routine Gram stain dudley bottle: Gram positive cocci in clusters resembling Staph      Results called to and read back by: Lorri Riggs RN  08/29/2024   13:50      Gram stain aer bottle: yeast      Results called to and read back by:Lorri Riggs RN 08/30/2024      STAPHYLOCOCCUS AUREUS  ID consult required at [facility] and [facility].  For susceptibility see order #[id number]        YEAST   Identification pending  For susceptibility see order #[id number]  ID consult required at [facility] and [facility].      Narrative:      Blood  culture # 2 different location.    Blood culture [[id number]] Collected: 09/04/24 0217    Order Status: Completed Specimen: Blood Updated: 09/08/24 0612     Blood Culture, Routine No Growth to date      No Growth to date      No Growth to date      No Growth to date      No Growth to date    Blood culture [[id number]]  (Abnormal)  (Susceptibility) Collected: 08/30/24 1152    Order Status: Completed Specimen: Blood Updated: 09/06/24 1356     Blood Culture, Routine Gram stain dudley bottle: yeast      Results called to and read back by: Rufino 09/01/2024  02:03      CANDIDA PARAPSILOSIS  [facility].  [facility].      Narrative:      Lft AC    Blood culture [[id number]] Collected: 08/30/24 1152    Order Status: Completed Specimen: Blood Updated: 09/04/24 1412     Blood Culture, Routine No growth after 5 days.    Narrative:      Rt AC    Culture, Anaerobe [[id number]] Collected: 08/27/24 1446    Order Status: Completed Specimen: Skin from Abdomen Updated: 09/02/24 0936     Anaerobic Culture No anaerobes isolated    Narrative:      Driveline site            I have reviewed all pertinent labs within the past 24 hours.    Estimated Creatinine Clearance: 75.8 mL/min (based on SCr of 1.3 mg/dL).    Diagnostic Results:  I have reviewed and interpreted all pertinent imaging results/findings within the past 24 hours.  Assessment and Plan:     Mr. Kevan Thacker is a 39 year old male with stage D 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 He underwent RVAD removal and chest closure 6/30/2022.  He was weaned off  but he had to restarted due to RVF. He was eventually transitioned to milrinone  (secondary to  shortage) now supposed to be on 0.25 mcg/kg/min. He has a history of unclear syncope vs seizures and is followed by neuro for this and is on Keppra.  [facility].      On 11/15/23 underwent removal of eroded [employer] scICD--no Lifevest (due to LVAD) and no plan to replace ICD as not requiring therapy post LVAD with concern for reinfection.  He has not had neurology f/u with seizure hx on keppra so coordinator to assist him with obtaining appt.      Reports back pain (primarily upper back pain) for the past 3-4 days. He reports that it started 2 days after stopping pirmacor and he feels it may be related. He has also been sleeping in the couch and identifies that it may also be related to the back pain. He denies lifting anything heavy or any falls /trauma. No red flags including incontinence of stool or urine. No numbness in the groin area or weakness in the legs or upper extremity reported. He reports he was not able to go for appointment because of transport issue that has now been resolved. He denies orthopnea, PND, weight gain, leg swelling. He says that he does have some shortness of breath after walking long distances which has been ongoing for months now and has not changed lately. He has lost some weight over last few months since his but weight has been stable over the last month. He denied headache, constipation, diarrhea, SOB, cough, palpitations or any additional symptoms.     * LVAD (left ventricular assist device) present  -HeartMate 3 Implanted  6/29/2022  as DT  -HTS Primary  -cont coumadin, Goal INR 2.0-3.0. Supratherapeutic this AM. Will not bridge.   -Antiplatelets ASA 81 mg  -LDH is stable overall today. Will continue to monitor daily.  -Speed set at 5100, LSL 4700 rpm  -Interrogation notable for  power cable disconnected, low voltage advisory, PI events  -Not listed for OHTx- advised pt he would not  be candidate for OHTx (noncompliance/poor f/up)   -UDS negative  -attempted to call partner Robyn, unfortunately goes to        Procedure: Device Interrogation Including analysis of device parameters  Current Settings: Ventricular Assist Device  Review of device function is stable/unstable stable        9/8/2024     7:23 AM 9/8/2024     3:35 AM 9/7/2024    11:41 PM 9/7/2024     7:25 PM 9/7/2024     3:36 PM 9/7/2024    11:23 AM 9/7/2024     7:32 AM   TXP LVAD INTERROGATIONS   Type HeartMate3 HeartMate3 HeartMate3 HeartMate3 HeartMate3 HeartMate3 HeartMate3   Flow 3.9 3.8 4 3.7 4 3.5 3.6   Speed 5100 5100 5100 5150 5100 5100 5100   PI 6.1 6.9 5.9 7.1 6.2 6 6.2   Power (Serna) 3.6 3.5 3.6 3.5 3.5 3.6 3.6   LSL 4700   4700   4700   Pulsatility   Intermittent pulse Intermittent pulse            Severe sepsis  -CT chest/abdomen and pelvis w/ contrast w/ pyelonephritis and renal abscess   -repeat CT spine with contrast +visualization of kidney read is stable, without worsening pyelo or abscess, no evidence of discitis    Plan:  -F/up cultures- +ve for staph, neg MRSA pcr, and yeast   -Repeat neg so will replace PICC.   -ID following-Will place  orders.   -C/s to Optho for eval of endopthalmitis in setting of yeast in blood   -Removed PICC on 8/28  -Driveline infection vs pyelo vs PICC related  - TTE negative for endocarditis      Acute on chronic systolic CHF (congestive heart failure)  Transition back to PO lasix.     Pulmonary nodules  Multiple pulmonary nodules seen on CT scan with contrast. Pt does have hx tobacco use. Lymph node enlargement noted as well. Pt has weight loss but not drastic  -need f/up scan in 3-6 months to assess stability of nodules     Pyelonephritis  Seen on ct with contrast. ID following.   -cont abx per ID   -no need for percutaneous drain per IR at this time unless pt decompensates  -current bacteremia/yeast not from renal source     Atrial flutter  pt previously went into atach vs aflutter.  Pt was started on amiodarone, with rhythm aborted to NSR following bolus. BP remained stable.   -BMP, replace lytes as needed   -cont amiodarone- will complete load and keep on maintenance dosing thereafter  -pt does report intermittently at home having episodes of palpitations that may also represent paroxysmal afib vs flutter at home. Already on a/c. Chadvasc of 5          Polysubstance abuse  Pt has history of polysubstance use.   -UDS negative  -prn oxycodone for pyelonephritis pain x1 per day max given hx substance use       Subcutaneous nodule of breast  Pt with unilateral R sided gynecomastia with hard nodular feeling beneath soft subcutaneous tissue of breast. No notable skin dimpling or rash.   CT with contrast reporting gyneocomastia without fluid collection/abscess. Pulmonary nodules seen on exam (needs f/up scan in 3-6 mos), enlarged lymph nodes noted on scan as well. D/w pt need for continued f/up outpatient for cancer screening. Appears to be more reactive than malignant on most recent scan.  -transition from spironolactone to epleronone     Supratherapeutic INR  -Goal INR 2-3 , INR 4.7 on admission - improving  - pharmacy  to manage coumadin dosing, cont warfarin   Check INR daily     Lab Results   Component Value Date    INR 3.2 (H) 09/04/2024    INR 3.3 (H) 09/03/2024    INR 2.8 (H) 09/02/2024         Bilateral back pain  Pt reports initial pain following running out of primacor. S/p spinal Xrays without significant abnormality besides degenerative disc disease.  NO red flag symptoms on admission  CT spine cervical, thoracic and lumbar negative for discitis/infectious concern and renal collection/pyelo appears to be unchanged.     Plan:  - Multimodal pain control  - titrate to patient's response  - C/w home gabapentin, tylenol prn and lidocaine patches.   - C/w Prn oxycodone added  - has not been using   - Schedule muscle relaxant overnight and assess for change in symptoms.   - Back pain more  pronounced on R side, most likely cause is his pyelonephritis  -c/w abx for pyelonephritis        Hypokalemia  Replace as needed and monitor      Type 2 diabetes mellitus with hyperglycemia, with long-term current use of insulin  A1C 10.3  Endocrinology consulted for glucose management, largely uncontrolled       Lab Results   Component Value Date    HGBA1C 10.3 (H) 08/25/2024    HGBA1C >14.0 (H) 04/26/2024    HGBA1C >14.0 (H) 02/17/2024    ATHPEYO4V 7.5 06/12/2023    [id number] 8.5 02/07/2023           Moderate malnutrition  -pt with aggressive care goals  -c/s nutrition, appreciate assistance     Seizure-like activity  History   C/w home keppra 1.5 gm BID    CHF (NYHA class IV, ACC/AHA stage D)    Updated 8/26/24:  EF 5-10% global hypokinesis, RV enlargement and global hypokinesis, biatrial enlargement, mild-to-moderate MR, severe TR, no van HeartMate 3 in place, aortic valve does not open, LVEDd 7.2  Previous TTE 9/5/23 with EF 10-15%, mod to sever TR, LVEDd 7.11, LVAD - HM3 in place    -Appears hypervolemic today so will diurese with IV lasix while undergoing inpatient treatment for bacteremia and fungemia   -GDMT: d/c spironolactone given possible gynecomastia, transition to epleronone   -Previously on home Primacor 0.25 at home but ran out approx 5 days prior to admission and also reports he occasionally disconnects himself from his pump to perform certain activities. Will plan to d/c off primacor   - TTE With LVEF 5-10%, poor RV function, dilation, no signs of endocarditis on TTE   -palliative care c/s and pt discussed with service, appreciate f/up- plan for continued follow up with palliative outpatient as well with Dr. Carr   -repeated [facility] discussion on 8/31- full code, aggressive care, pt educated about compliance going forward - re-iterated. Pt understanding, apologetic for previous noncompliance. Pt understands he is not a candidate for heart transplant and will not be evaluated due to his noncompliance.            Fausto Reyes, NP  Heart Transplant  [facility] - Cardiology Stepdown

## 2024-09-08 NOTE — CONSULTS
Inpatient Radiology Pre-procedure Note    History of Present Illness:  Kevan Queen is a 39 y.o. male who presents for tunneled line access for home IV medications. Patient has a history of CHF.    Admission H&P reviewed.  Past Medical History:   Diagnosis Date    Acute respiratory failure with hypoxia 07/22/2023    Arthritis     Awareness alteration, transient 09/01/2022    Cardiomyopathy     CHF (congestive heart failure) 10/01/2020    COVID-19 06/03/2023    Diabetes mellitus     Dilated cardiomyopathy 10/26/2020    Drug abuse 10/2020    Headache 04/19/2023    Hyperglycemia 12/16/2022    Hyperosmolar hyperglycemic state (HHS) 05/25/2022    ICD (implantable cardioverter-defibrillator) in place 10/26/2020    Infected defibrillator now s/p removal Nov 2023 07/23/2023    Left ventricular assist device (LVAD) complication, initial encounter 06/05/2023    Muscle cramping 06/15/2022    Renal disorder     SOB (shortness of breath) 06/13/2022    Tingling in extremities 07/13/2022     Past Surgical History:   Procedure Laterality Date    APPLICATION OF WOUND VACUUM-ASSISTED CLOSURE DEVICE N/A 6/30/2022    Procedure: APPLICATION, WOUND VAC;  Surgeon: Luis F Paige MD;  Location: [facility] OR [address] [place] [address];  Service: Cardiovascular;  Laterality: N/A;  50 x 5 cm    CARDIAC DEFIBRILLATOR PLACEMENT      ECHOCARDIOGRAM,TRANSESOPHAGEAL  11/9/2023    Procedure: Transesophageal echo (GUILLERMO) intra-procedure log documentation;  Surgeon: Eron Ivy MD;  Location: [facility] EP LAB;  Service: Cardiology;;    EXTRACTION, ELECTRODE LEAD Left 11/9/2023    Procedure: EXTRACTION, ELECTRODE LEAD;  Surgeon: ADALID Leon MD;  Location: [facility] EP LAB;  Service: Cardiology;  Laterality: Left;  INFECTION, LEAD EXTRACTION, GUILLERMO, BSCI, ANES, EH,   *NO CTS BACKUP*    IMPLANTATION OF RIGHT VENTRICULAR ASSIST DEVICE (RVAD) N/A 6/29/2022    Procedure: INSERTION, RVAD;  Surgeon: Luis F Paige MD;  Location: [facility] OR [address] [place] [address];  Service:  Cardiovascular;  Laterality: N/A;    INSERTION OF GRAFT TO PERICARDIUM Right 6/30/2022    Procedure: INSERTION-RIGHT VENTRICULAR ASSIST DEVICE;  Surgeon: Luis F Paige MD;  Location: [facility] OR [facility] FLR;  Service: Cardiovascular;  Laterality: Right;    IRRIGATION OF MEDIASTINUM  6/30/2022    Procedure: IRRIGATION, MEDIASTINUM;  Surgeon: Luis F Paige MD;  Location: [facility] OR [facility] FLR;  Service: Cardiovascular;;    LEFT VENTRICULAR ASSIST DEVICE Left 6/23/2022    Procedure: INSERTION-LEFT VENTRICULAR ASSIST DEVICE;  Surgeon: Luis F Paige MD;  Location: [facility] OR [facility] FLR;  Service: Cardiovascular;  Laterality: Left;    LEFT VENTRICULAR ASSIST DEVICE N/A 6/29/2022    Procedure: INSERTION-LEFT VENTRICULAR ASSIST DEVICE;  Surgeon: Luis F Paige MD;  [facility];  Service: Cardiovascular;  Laterality: N/A;    REVISION OF SKIN POCKET FOR CARDIOVERTER-DEFIBRILLATOR  11/9/2023    Procedure: REVISION, SKIN POCKET, FOR CARDIOVERTER-DEFIBRILLATOR;  Surgeon: ADALID Leon MD;  Location: [facility] EP LAB;  Service: Cardiology;;    RIGHT HEART CATHETERIZATION Right 4/8/2022    Procedure: INSERTION, CATHETER, RIGHT HEART;  Surgeon: Luca Lopez Jr., MD;  Location: [facility] CATH LAB;  Service: Cardiology;  Laterality: Right;    RIGHT HEART CATHETERIZATION Right 4/19/2022    Procedure: INSERTION, CATHETER, RIGHT HEART;  Surgeon: Josh Pulido MD;  Location: [facility] CATH LAB;  Service: Cardiology;  Laterality: Right;    RIGHT HEART CATHETERIZATION Right 7/21/2022    Procedure: INSERTION, CATHETER, RIGHT HEART;  Surgeon: Dalia Crum MD;  Location: [facility] CATH LAB;  Service: Cardiology;  Laterality: Right;    RIGHT HEART CATHETERIZATION Right 10/31/2022    Procedure: INSERTION, CATHETER, RIGHT HEART;  Surgeon: Dalia Crum MD;  Location: [facility] CATH LAB;  Service: Cardiology;  Laterality: Right;    STERNAL WOUND CLOSURE N/A 6/30/2022    Procedure: CLOSURE, WOUND, STERNUM;  Surgeon: LuisF Paige MD;  Location:  [facility] OR 2ND FLR;  Service: Cardiovascular;  Laterality: N/A;       Review of Systems:   As documented in primary team H&P    Home Meds:   Prior to Admission medications    Medication Sig Start Date End Date Taking? Authorizing Provider   milrinone 20mg/100ml D5W, 200mcg/ml, (PRIMACOR) 20 mg/100 mL (200 mcg/mL) infusion Inject 23.6 mcg/min into the vein continuous. 9/11/23  Yes Marlo Marques MD   warfarin (COUMADIN) 3 MG tablet Take 1 tablet (3 mg total) by mouth Daily. Starting 5/27/2024. 5/27/24 5/27/25 Yes Chantal Herndon MD   acetaminophen (TYLENOL) 325 MG tablet Take 2 tablets (650 mg total) by mouth every 6 (six) hours as needed for Pain. 9/11/23   Marlo Marques MD   amLODIPine (NORVASC) 5 MG tablet Take 1 tablet (5 mg total) by mouth once daily. 4/27/24 4/27/25  Natalya Villatoro MD   aspirin (ECOTRIN) 81 MG EC tablet Take 1 tablet (81 mg total) by mouth once daily. 8/9/23   Josh Ryan MD   atorvastatin (LIPITOR) 40 MG tablet Take 1 tablet (40 mg total) by mouth once daily. 9/12/23 9/11/24  Marlo Marques MD   blood sugar diagnostic Strp Use to test blood glucose 4 (four) times daily. 1/9/24   Eron Lees MD   blood-glucose meter (TRUE METRIX GLUCOSE METER) Misc Use to test blood glucose 4 (four) times daily. 11/15/23      blood-glucose sensor (FREESTYLE LUCIUS 3 SENSOR) Dee 1 Device by Misc.(Non-Drug; Combo Route) route every 14 (fourteen) days. 4/26/24 4/26/25  Susanna Doty PA-C   cefadroxil (DURICEF) 500 MG Cap Take 1 capsule (500 mg total) by mouth every 12 (twelve) hours. 11/29/23 11/28/24  Laura Baldwin MD   cyclobenzaprine (FLEXERIL) 5 MG tablet Take 1 tablet (5 mg total) by mouth daily as needed for Muscle spasms. To be taken prior to daily lasix dose. 5/26/24 9/23/24  Chantal Herndon MD   DULoxetine (CYMBALTA) 30 MG capsule Take 1 capsule (30 mg total) by mouth once daily. 5/29/24 5/29/25  Jane Ash MD   furosemide (LASIX) 80 MG tablet Take 1  "tablet (80 mg total) by mouth once daily. 10/25/23 10/24/24  Natalya Villatoro MD   gabapentin (NEURONTIN) 100 MG capsule Take 1 capsule (100 mg total) by mouth 2 (two) times daily AND 4 capsules (400 mg total) every evening. 5/29/24 5/29/25  Jane Ash MD   glucagon (BAQSIMI) 3 mg/actuation Spry 1 each by Nasal route as needed (hypoglycemia). 4/26/24   Susanna Doty PA-C   insulin aspart U-100 (NOVOLOG) 100 unit/mL (3 mL) InPn pen Inject 18 Units into the skin 3 (three) times daily with meals: MAX 94 units/daily  150-200    201-250    251-300    301-350    >350  +2 units   +4 units    +6 units    +8 units    +10 units 4/26/24   Susanna Doty PA-C   insulin detemir U-100, Levemir, 100 unit/mL (3 mL) SubQ InPn pen Inject 20 Units into the skin 2 (two) times daily. In the morning and before bed. 4/26/24 7/25/24  Susanna Doty PA-C   lancets 33 gauge Misc Use to test blood glucose 4 (four) times daily. 11/15/23   Dalia Crum MD   levETIRAcetam (KEPPRA) 1000 MG tablet Take 1.5 tablets (1,500 mg total) by mouth 2 (two) times daily. 12/22/23   Rodger Yadav IV, MD   magnesium oxide (MAG-OX) 400 mg (241.3 mg magnesium) tablet Take 1 tablet (400 mg total) by mouth once daily. 10/25/23   Natalya Villatoro MD   mirtazapine (REMERON) 15 MG tablet Take 1 tablet (15 mg total) by mouth nightly. 10/25/23   Natalya Villatoro MD   ondansetron (ZOFRAN-ODT) 4 MG TbDL Take 1 tablet (4 mg total) by mouth every 8 (eight) hours as needed (nausea). 2/4/24   Prasad Banda MD   pantoprazole (PROTONIX) 40 MG tablet Take 1 tablet (40 mg total) by mouth once daily. 9/12/23 9/11/24  Marlo Marques MD   pen needle, diabetic 32 gauge x 5/32" Ndle 1 each by Misc.(Non-Drug; Combo Route) route 4 (four) times daily. 4/26/24   Susanna Doty PA-C   potassium chloride SA (K-DUR,KLOR-CON M) 10 MEQ tablet Take 3 tablets (30 mEq total) by mouth once daily. 4/26/24   Natalya Villatoro MD   spironolactone (ALDACTONE) 25 MG " tablet Take 1 tablet (25 mg total) by mouth once daily. 10/9/23 10/8/24  Josh Ryan MD   digoxin (LANOXIN) 125 mcg tablet Take 1 tablet (0.125 mg total) by mouth once daily. 4/26/22 5/27/22  Jeff Spencer PA-C   EScitalopram oxalate (LEXAPRO) 5 MG Tab Take 1 tablet (5 mg total) by mouth once daily. 5/27/22 7/27/22  Luca Lopez Jr., MD   insulin (LANTUS SOLOSTAR U-100 INSULIN) glargine 100 units/mL (3mL) SubQ pen Inject 10 Units into the skin every evening.  Patient not taking: Reported on 5/24/2022 5/20/22 5/27/22  Bautista Lynch III, MD   metOLazone (ZAROXOLYN) 2.5 MG tablet Take 2.5 mg by mouth every other day. 11/25/21 4/25/22  Provider, Historical   metoprolol succinate (TOPROL-XL) 25 MG 24 hr tablet TAKE 1 TABLET(25 MG) BY MOUTH EVERY DAY 5/19/21 4/25/22  Luca Lopez Jr., MD     Scheduled Meds:    [START ON 9/9/2024] amiodarone  200 mg Oral Daily    amiodarone  400 mg Oral BID    amLODIPine  5 mg Oral Daily    aspirin  81 mg Oral Daily    atorvastatin  40 mg Oral Daily    ceFAZolin (Ancef) IV (PEDS and ADULTS)  2 g Intravenous Q8H    cyclobenzaprine  5 mg Oral BID    DULoxetine  30 mg Oral Daily    eplerenone  25 mg Oral Daily    fluconazole (DIFLUCAN) IV (PEDS and ADULTS)  400 mg Intravenous Daily    furosemide  80 mg Oral Daily    gabapentin  100 mg Oral BID    gabapentin  400 mg Oral QHS    insulin aspart U-100  8 Units Subcutaneous TIDWM    insulin glargine U-100  12 Units Subcutaneous BID    levETIRAcetam  1,500 mg Oral BID    LIDOcaine  2 patch Transdermal Q24H    magnesium oxide  400 mg Oral Daily    nicotine  1 patch Transdermal Daily    pantoprazole  40 mg Oral Daily    polyethylene glycol  17 g Oral Daily    potassium chloride  40 mEq Oral TID    warfarin  1 mg Oral Daily     Continuous Infusions:   PRN Meds:  Current Facility-Administered Medications:     acetaminophen, 650 mg, Oral, Q6H PRN    dextrose 10%, 12.5 g, Intravenous, PRN    dextrose 10%, 25 g,  "Intravenous, PRN    glucagon (human recombinant), 1 mg, Intramuscular, PRN    glucose, 16 g, Oral, PRN    glucose, 24 g, Oral, PRN    insulin aspart U-100, 0-10 Units, Subcutaneous, QID (AC + HS) PRN    ondansetron, 4 mg, Oral, Q8H PRN    oxyCODONE, 10 mg, Oral, Daily PRN  Anticoagulants/Antiplatelets: asa    Allergies:   Review of patient's allergies indicates:   Allergen Reactions    Torsemide Hives     Sedation Hx: have not been any systemic reactions    Labs:  Recent Labs   Lab 09/08/24 0338   INR 2.1*       Recent Labs   Lab 09/08/24 0338   WBC 9.89   HGB 7.9*   HCT 27.1*   MCV 65*   *      Recent Labs   Lab 09/06/24  0526 09/07/24  0511 09/08/24 0338   *   < > 114*   *   < > 131*   K 4.6   < > 4.3   CL 93*   < > 95   CO2 26   < > 24   BUN 20   < > 26*   CREATININE 1.2   < > 1.3   CALCIUM 9.5   < > 9.8   MG 1.9   < > 1.8   ALT 5*  --   --    AST 38  --   --    ALBUMIN 2.6*  --   --    BILITOT 2.0*  --   --    BILIDIR 1.3*  --   --     < > = values in this interval not displayed.         Vitals:  Temp: 98 °F (36.7 °C) (09/08/24 1129)  Pulse: 86 (09/08/24 1129)  Resp: 19 (09/08/24 1129)  BP: (!) 90/0 (09/08/24 1129)  SpO2: 99 % (09/08/24 0723)     Physical Exam:  ASA: 3  Mallampati: 2    General: no acute distress  Mental Status: alert and oriented to person, place and time  HEENT: normocephalic, atraumatic  Chest: unlabored breathing  Heart: regular heart rate  Abdomen: nondistended  Extremity: moves all extremities    Plan: TUnneled PICC placement  Sedation Plan: local    Eduardo Back MD ([place])  Interventional Radiology          "

## 2024-09-08 NOTE — NURSING
Nurses Note -- 4 Eyes      9/8/2024   7:31 AM      Skin assessed during: Q Shift Change      [x] No Altered Skin Integrity Present    []Prevention Measures Documented      [] Yes- Altered Skin Integrity Present or Discovered   [] LDA Added if Not in Epic (Describe Wound)   [] New Altered Skin Integrity was Present on Admit and Documented in LDA   [] Wound Image Taken    Wound Care Consulted? No    Attending Nurse:  Robin Mullen RN/Staff Member:  Julianne

## 2024-09-08 NOTE — NURSING
Rt IJ  dressing still moderately saturated with bleeding. Quik clot left in place for now. Will pass on to night shift nurse to reassess later.

## 2024-09-08 NOTE — PROCEDURES
"Kevan Queen is a 39 y.o. male patient.    Temp: 97.7 °F (36.5 °C) (09/08/24 0723)  Pulse: 78 (09/08/24 0730)  Resp: 18 (09/08/24 0723)  BP: (!) 86/0 (09/08/24 0723)  SpO2: 99 % (09/08/24 0723)  Weight: 70.2 kg (154 lb 12.2 oz) (09/07/24 0614)  Height: 6' 3" (190.5 cm) (08/26/24 1516)    PICC  Date/Time: 9/8/2024 9:54 AM  Location procedure was performed: [facility] PICC LINE PLACEMENT  Performed by: Dakota George RN  Assisting provider: Agustín Messer LPN  Consent Done: Yes  Time out: Immediately prior to procedure a time out was called to verify the correct patient, procedure, equipment, support staff and site/side marked as required  Indications: med administration  Anesthesia: local infiltration  Local anesthetic: lidocaine 1% without epinephrine  Anesthetic Total (mL): 3  Preparation: skin prepped with ChloraPrep  Skin prep agent dried: skin prep agent completely dried prior to procedure  Sterile barriers: all five maximum sterile barriers used - cap, mask, sterile gown, sterile gloves, and large sterile sheet  Hand hygiene: hand hygiene performed prior to central venous catheter insertion  Location details: left brachial  Catheter type: double lumen  Catheter size: 5 Fr  Catheter Length: 40cm    Ultrasound guidance: yes  Vessel Caliber: medium and patent, compressibility normal  Vascular Doppler: not done  Needle advanced into vessel with real time Ultrasound guidance.  Guidewire confirmed in vessel.  Image recorded and saved.  Sterile sheath used.  no esophageal manometryNumber of attempts: 1  Post-procedure: blood return through all ports, sterile dressing applied and chlorhexidine patch  Technical procedures used: 3CG  Specimens: No  Implants: No  Comments: Unable to get line central on the left. Vein diameter inadequate for placement on the right. RN to notify NP.           Name Agustín Messer LPN   9/8/2024    "

## 2024-09-08 NOTE — PROGRESS NOTES
[facility] - Cardiology Stepdown  Endocrinology  Progress Note    Admit Date: 8/25/2024     Reason for Consult: Management of T2DM, Hyperglycemia     Surgical Procedure and Date: LVAD 06/29/2022     Diabetes diagnosis year: 2022    Home Diabetes Medications:  Levemir 20 units twice daily and Novolog SSI (150/25) per patient    How often checking glucose at home?  Has not checked in a few weeks due to running out of strips    BG readings on regimen: COLLIN  Hypoglycemia on the regimen?  Yes; rarely experiences hypoglycemic symptoms such as blurry vision and vomiting. However, does not know his blood sugar level due to running out of supplies. He will self-correct with a snack.  Missed doses on regimen?  Yes, has not had insulin in a few weeks due to running out of glucometer supplies.    Diabetes Complications include:   Hyperglycemia    Complicating diabetes co morbidities:   History of MI, CHF, and CKD      HPI:   Patient is a 39 y.o. 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 He underwent RVAD removal and chest closure 6/30/2022.  He was weaned off  but restarted due to RVF. He was transitioned to milrinone (secondary to  shortage). History of unclear syncope vs seizures and followed by neuro and is on Keppra. On 11/15/23 underwent removal of eroded [employer] scICD--no Lifevest (due to LVAD) and no plan to replace ICD as not requiring therapy post LVAD with concern for reinfection. Reported back pain (primarily upper back pain) for the past 3-4 days. He reported that it started 2 days after stopping pirmacor and he felt it may be related. Reported some shortness of breath after walking long distances which has been ongoing for months now and has not changed lately. He has lost some weight over last few months since his  "but weight has been stable over the last month. Patient admitted after being out of [facility] for 1 week with ongoing back pain. Patient stated he has not taken his insulin in a few weeks due to running out of glucometer testing supplies. He previously had a William, but is not wearing one. BG >700 at [facility]. Endo consulted for BG management.      Interval HPI:   No acute events overnight. Patient in room 319/319 A. Blood glucose  variable . BG at, above, and below goal on current insulin regimen (SSI, prandial, and basal insulin ). Steroid use- None.    Renal function- Normal   Lab Results   Component Value Date    CREATININE 1.3 2024     Vasopressors-  None     Endocrine will continue to follow and manage insulin orders inpatient.     Diet diabetic Cardiac (Low Na/Chol); 2000 Calorie; Fluid - 1500mL; Standard Tray     Eatin%  Nausea: No  Hypoglycemia and intervention: Yes; BG 65 at 1553 and confirmed at 1556 with BG of 65 despite normal appetite and consuming 100% of meals. Novolog held and 16g of oral dextrose administered along with orange juice per RN. Repeat  at 1937 and 259 at 1952.   Fever: No  TPN and/or TF: No  If yes, type of TF/TPN and rate: N/A    BP (!) 86/0 (BP Location: Left arm, Patient Position: Lying)   Pulse 78   Temp 97.7 °F (36.5 °C) (Oral)   Resp 18   Ht 6' 3" (1.905 m)   Wt 70.2 kg (154 lb 12.2 oz)   SpO2 99%   BMI 19.34 kg/m²     Labs Reviewed and Include    Recent Labs   Lab 24  0338   *   CALCIUM 9.8   *   K 4.3   CO2 24   CL 95   BUN 26*   CREATININE 1.3     Lab Results   Component Value Date    WBC 9.89 2024    HGB 7.9 (L) 2024    HCT 27.1 (L) 2024    MCV 65 (L) 2024     (H) 2024     No results for input(s): "TSH", "FREET4" in the last 168 hours.  Lab Results   Component Value Date    HGBA1C 10.3 (H) 2024       Nutritional status:   Body mass index is 19.34 kg/m².  Lab Results   Component " Value Date    ALBUMIN 2.6 (L) 09/06/2024    ALBUMIN 2.6 (L) 09/04/2024    ALBUMIN 2.6 (L) 09/02/2024     Lab Results   Component Value Date    PREALBUMIN 10 (L) 09/06/2024    PREALBUMIN 10 (L) 09/04/2024    PREALBUMIN 10 (L) 09/02/2024       Estimated Creatinine Clearance: 75.8 mL/min (based on SCr of 1.3 mg/dL).    Accu-Checks  Recent Labs     09/06/24  1046 09/06/24  1559 09/06/24  2006 09/07/24  0639 09/07/24  1036 09/07/24  1553 09/07/24  1556 09/07/24  1937 09/07/24  1952 09/08/24  0658   POCTGLUCOSE 193* 169* 220* 206* 233* 65* 65* 159* 259* 157*       Current Medications and/or Treatments Impacting Glycemic Control  Immunotherapy:    Immunosuppressants       None          Steroids:   Hormones (From admission, onward)      None          Pressors:    Autonomic Drugs (From admission, onward)      None          Hyperglycemia/Diabetes Medications:   Antihyperglycemics (From admission, onward)      Start     Stop Route Frequency Ordered    09/08/24 0715  insulin aspart U-100 pen 8 Units         -- SubQ 3 times daily with meals 09/07/24 1848    09/07/24 0900  insulin glargine U-100 (Lantus) pen 12 Units         -- SubQ 2 times daily 09/07/24 0809    08/25/24 1506  insulin aspart U-100 pen 0-10 Units         -- SubQ Before meals & nightly PRN 08/25/24 1407            ASSESSMENT and PLAN    Cardiac/Vascular  * LVAD (left ventricular assist device) present  Managed by primary team  Optimize blood glucose control        CHF (NYHA class IV, ACC/AHA stage D)  Managed by primary team  Optimize blood glucose control      Endocrine  Type 2 diabetes mellitus with hyperglycemia, with long-term current use of insulin  BG goal 140-180  Noncompliant T2DM.     Plan:   - Continue Lantus (Insulin Glargine) 12 units BID   - Decrease Novolog (Insulin Aspart) to 8 units TIDWM (20% decrease due to hypoglycemia at 1553). HOLD if BG < 100  - Moderate dose correction (150/25)  - BG checks AC/HS  - Hypoglycemia protocol in place    **  Please notify Endocrine for any change and/or advance in diet**  ** Please call Endocrine for any BG related issues **    Discharge Planning:   TBD. Please notify endocrinology prior to discharge.  Patient requires glucometer and testing supplies.  Recommend he resumes his William for blood sugar monitoring.          Guera De Oliveira PA-C  Endocrinology  [facility] - Cardiology Stepdown

## 2024-09-08 NOTE — PLAN OF CARE
Procedure completed. Patient tolerated well; VSS. Site CDI. Patient to be transported back to inpatient Rm 319.  Report called to RIAN Whitaker.  Patient transported with VAD and emergency bag.

## 2024-09-08 NOTE — PROGRESS NOTES
09/08/2024  John Alaniz    Current provider:  Natalya Villatoro MD    Device interrogation:      9/8/2024     3:35 AM 9/7/2024    11:41 PM 9/7/2024     7:25 PM 9/7/2024     3:36 PM 9/7/2024    11:23 AM 9/7/2024     7:32 AM 9/7/2024     3:58 AM   TXP LVAD INTERROGATIONS   Type HeartMate3 HeartMate3 HeartMate3 HeartMate3 HeartMate3 HeartMate3 HeartMate3   Flow 3.8 4 3.7 4 3.5 3.6 3.7   Speed 5100 5100 5150 5100 5100 5100 5100   PI 6.9 5.9 7.1 6.2 6 6.2 6.6   Power (Serna) 3.5 3.6 3.5 3.5 3.6 3.6 3.5   LSL   4700   4700 4700   Pulsatility  Intermittent pulse Intermittent pulse    Intermittent pulse          Rounded on Kevan Queen to ensure all mechanical assist device settings (IABP or VAD) were appropriate and all parameters were within limits.  I was able to ensure all back up equipment was present, the staff had no issues, and the Perfusion Department daily rounding was complete.      For implantable VADs: Interrogation of Ventricular assist device was performed with analysis of device parameters and review of device function. I have personally reviewed the interrogation findings and agree with findings as stated.     In emergency, the nursing units have been notified to contact the perfusion department either by:  Calling [phone] from 630am to 4pm Mon thru Fri, utilizing the On-Call Finder functionality of Epic and searching for Perfusion, or by contacting the hospital  from 4pm to 630am and on weekends and asking to speak with the perfusionist on call.    6:54 AM

## 2024-09-08 NOTE — PLAN OF CARE
Pt arrived to IR room 188 for tunneled line. Pt oriented to unit and staff, Pt safely transferred from stretcher to procedural table. Fall risk reviewed and comfort measures utilized with interventions. Safety strap applied, position pillows to minimize pressure points. Blankets applied. Pt prepped and draped utilizing standard sterile technique. Patient placed on continuous monitoring, as required by sedation policy. Timeouts implemented utilizing standard universal time-out per department and facility policy. RN to remain at bedside with continuous monitoring. Pt resting comfortably. Denies pain/discomfort. Will continue to monitor. See flow sheets for monitoring, medication administration, and updates. patient verbalizes understanding.

## 2024-09-08 NOTE — ASSESSMENT & PLAN NOTE
BG goal 140-180  Noncompliant T2DM.     Plan:   - Continue Lantus (Insulin Glargine) 12 units BID   - Decrease Novolog (Insulin Aspart) to 8 units TIDWM (20% decrease due to hypoglycemia at 1553). HOLD if BG < 100  - Moderate dose correction (150/25)  - BG checks AC/HS  - Hypoglycemia protocol in place    ** Please notify Endocrine for any change and/or advance in diet**  ** Please call Endocrine for any BG related issues **    Discharge Planning:   TBD. Please notify endocrinology prior to discharge.  Patient requires glucometer and testing supplies.  Recommend he resumes his William for blood sugar monitoring.

## 2024-09-09 LAB
ALBUMIN SERPL BCP-MCNC: 2.7 G/DL (ref 3.5–5.2)
ALP SERPL-CCNC: 273 U/L (ref 55–135)
ALT SERPL W/O P-5'-P-CCNC: <5 U/L (ref 10–44)
ANION GAP SERPL CALC-SCNC: 10 MMOL/L (ref 8–16)
APTT PPP: 40.4 SEC (ref 21–32)
AST SERPL-CCNC: 27 U/L (ref 10–40)
BACTERIA BLD CULT: ABNORMAL
BACTERIA BLD CULT: NORMAL
BASOPHILS # BLD AUTO: 0.04 K/UL (ref 0–0.2)
BASOPHILS NFR BLD: 0.4 % (ref 0–1.9)
BILIRUB DIRECT SERPL-MCNC: 1.2 MG/DL (ref 0.1–0.3)
BILIRUB SERPL-MCNC: 1.7 MG/DL (ref 0.1–1)
BNP SERPL-MCNC: 717 PG/ML (ref 0–99)
BUN SERPL-MCNC: 31 MG/DL (ref 6–20)
CALCIUM SERPL-MCNC: 9.7 MG/DL (ref 8.7–10.5)
CHLORIDE SERPL-SCNC: 94 MMOL/L (ref 95–110)
CO2 SERPL-SCNC: 25 MMOL/L (ref 23–29)
CREAT SERPL-MCNC: 1.5 MG/DL (ref 0.5–1.4)
CRP SERPL-MCNC: 31.1 MG/L (ref 0–8.2)
DIFFERENTIAL METHOD BLD: ABNORMAL
EOSINOPHIL # BLD AUTO: 0 K/UL (ref 0–0.5)
EOSINOPHIL NFR BLD: 0.3 % (ref 0–8)
ERYTHROCYTE [DISTWIDTH] IN BLOOD BY AUTOMATED COUNT: 25.2 % (ref 11.5–14.5)
EST. GFR  (NO RACE VARIABLE): >60 ML/MIN/1.73 M^2
GLUCOSE SERPL-MCNC: 142 MG/DL (ref 70–110)
HCT VFR BLD AUTO: 27.2 % (ref 40–54)
HGB BLD-MCNC: 7.7 G/DL (ref 14–18)
IMM GRANULOCYTES # BLD AUTO: 0.03 K/UL (ref 0–0.04)
IMM GRANULOCYTES NFR BLD AUTO: 0.3 % (ref 0–0.5)
INR PPP: 2.3 (ref 0.8–1.2)
LDH SERPL L TO P-CCNC: 298 U/L (ref 110–260)
LYMPHOCYTES # BLD AUTO: 1.3 K/UL (ref 1–4.8)
LYMPHOCYTES NFR BLD: 14.1 % (ref 18–48)
MAGNESIUM SERPL-MCNC: 2 MG/DL (ref 1.6–2.6)
MCH RBC QN AUTO: 19.2 PG (ref 27–31)
MCHC RBC AUTO-ENTMCNC: 28.3 G/DL (ref 32–36)
MCV RBC AUTO: 68 FL (ref 82–98)
MONOCYTES # BLD AUTO: 0.7 K/UL (ref 0.3–1)
MONOCYTES NFR BLD: 8.3 % (ref 4–15)
NEUTROPHILS # BLD AUTO: 6.8 K/UL (ref 1.8–7.7)
NEUTROPHILS NFR BLD: 76.6 % (ref 38–73)
NRBC BLD-RTO: 0 /100 WBC
PHOSPHATE SERPL-MCNC: 4.9 MG/DL (ref 2.7–4.5)
PLATELET # BLD AUTO: 502 K/UL (ref 150–450)
PMV BLD AUTO: 9.3 FL (ref 9.2–12.9)
POCT GLUCOSE: 109 MG/DL (ref 70–110)
POCT GLUCOSE: 144 MG/DL (ref 70–110)
POCT GLUCOSE: 147 MG/DL (ref 70–110)
POCT GLUCOSE: 160 MG/DL (ref 70–110)
POCT GLUCOSE: 179 MG/DL (ref 70–110)
POTASSIUM SERPL-SCNC: 5 MMOL/L (ref 3.5–5.1)
PREALB SERPL-MCNC: 11 MG/DL (ref 20–43)
PROT SERPL-MCNC: 9.2 G/DL (ref 6–8.4)
PROTHROMBIN TIME: 24 SEC (ref 9–12.5)
RBC # BLD AUTO: 4.01 M/UL (ref 4.6–6.2)
SODIUM SERPL-SCNC: 129 MMOL/L (ref 136–145)
WBC # BLD AUTO: 8.94 K/UL (ref 3.9–12.7)

## 2024-09-09 PROCEDURE — 25000003 PHARM REV CODE 250: Performed by: INTERNAL MEDICINE

## 2024-09-09 PROCEDURE — 25000003 PHARM REV CODE 250: Performed by: STUDENT IN AN ORGANIZED HEALTH CARE EDUCATION/TRAINING PROGRAM

## 2024-09-09 PROCEDURE — 25000003 PHARM REV CODE 250: Performed by: NURSE PRACTITIONER

## 2024-09-09 PROCEDURE — 25000003 PHARM REV CODE 250

## 2024-09-09 PROCEDURE — 85610 PROTHROMBIN TIME: CPT | Performed by: STUDENT IN AN ORGANIZED HEALTH CARE EDUCATION/TRAINING PROGRAM

## 2024-09-09 PROCEDURE — 84134 ASSAY OF PREALBUMIN: CPT | Performed by: STUDENT IN AN ORGANIZED HEALTH CARE EDUCATION/TRAINING PROGRAM

## 2024-09-09 PROCEDURE — 85730 THROMBOPLASTIN TIME PARTIAL: CPT | Performed by: STUDENT IN AN ORGANIZED HEALTH CARE EDUCATION/TRAINING PROGRAM

## 2024-09-09 PROCEDURE — 84100 ASSAY OF PHOSPHORUS: CPT | Performed by: STUDENT IN AN ORGANIZED HEALTH CARE EDUCATION/TRAINING PROGRAM

## 2024-09-09 PROCEDURE — 63600175 PHARM REV CODE 636 W HCPCS: Performed by: INTERNAL MEDICINE

## 2024-09-09 PROCEDURE — 63600175 PHARM REV CODE 636 W HCPCS: Performed by: STUDENT IN AN ORGANIZED HEALTH CARE EDUCATION/TRAINING PROGRAM

## 2024-09-09 PROCEDURE — 25000003 PHARM REV CODE 250: Performed by: PHYSICIAN ASSISTANT

## 2024-09-09 PROCEDURE — 99232 SBSQ HOSP IP/OBS MODERATE 35: CPT | Mod: ,,,

## 2024-09-09 PROCEDURE — 83615 LACTATE (LD) (LDH) ENZYME: CPT | Performed by: STUDENT IN AN ORGANIZED HEALTH CARE EDUCATION/TRAINING PROGRAM

## 2024-09-09 PROCEDURE — 20600001 HC STEP DOWN PRIVATE ROOM

## 2024-09-09 PROCEDURE — 27000248 HC VAD-ADDITIONAL DAY

## 2024-09-09 PROCEDURE — 86140 C-REACTIVE PROTEIN: CPT | Performed by: STUDENT IN AN ORGANIZED HEALTH CARE EDUCATION/TRAINING PROGRAM

## 2024-09-09 PROCEDURE — 83735 ASSAY OF MAGNESIUM: CPT | Performed by: STUDENT IN AN ORGANIZED HEALTH CARE EDUCATION/TRAINING PROGRAM

## 2024-09-09 PROCEDURE — 83880 ASSAY OF NATRIURETIC PEPTIDE: CPT | Performed by: STUDENT IN AN ORGANIZED HEALTH CARE EDUCATION/TRAINING PROGRAM

## 2024-09-09 PROCEDURE — 80048 BASIC METABOLIC PNL TOTAL CA: CPT | Performed by: STUDENT IN AN ORGANIZED HEALTH CARE EDUCATION/TRAINING PROGRAM

## 2024-09-09 PROCEDURE — 80076 HEPATIC FUNCTION PANEL: CPT | Performed by: STUDENT IN AN ORGANIZED HEALTH CARE EDUCATION/TRAINING PROGRAM

## 2024-09-09 PROCEDURE — 85025 COMPLETE CBC W/AUTO DIFF WBC: CPT | Performed by: STUDENT IN AN ORGANIZED HEALTH CARE EDUCATION/TRAINING PROGRAM

## 2024-09-09 RX ORDER — FLUCONAZOLE 200 MG/1
400 TABLET ORAL DAILY
Qty: 60 TABLET | Refills: 1 | Status: SHIPPED | OUTPATIENT
Start: 2024-09-09 | End: 2024-09-18 | Stop reason: SDUPTHER

## 2024-09-09 RX ORDER — INSULIN ASPART 100 [IU]/ML
INJECTION, SOLUTION INTRAVENOUS; SUBCUTANEOUS
Start: 2024-09-09

## 2024-09-09 RX ORDER — AMIODARONE HYDROCHLORIDE 200 MG/1
200 TABLET ORAL DAILY
Qty: 30 TABLET | Refills: 11 | Status: SHIPPED | OUTPATIENT
Start: 2024-09-10 | End: 2024-09-09

## 2024-09-09 RX ORDER — AMIODARONE HYDROCHLORIDE 200 MG/1
200 TABLET ORAL DAILY
Qty: 30 TABLET | Refills: 11 | Status: SHIPPED | OUTPATIENT
Start: 2024-09-10 | End: 2024-09-17 | Stop reason: SDUPTHER

## 2024-09-09 RX ORDER — WARFARIN 3 MG/1
1.5 TABLET ORAL DAILY
Qty: 30 TABLET | Refills: 5 | Status: SHIPPED | OUTPATIENT
Start: 2024-09-09 | End: 2024-09-17 | Stop reason: SDUPTHER

## 2024-09-09 RX ORDER — POTASSIUM CHLORIDE 750 MG/1
30 CAPSULE, EXTENDED RELEASE ORAL DAILY
Status: DISCONTINUED | OUTPATIENT
Start: 2024-09-10 | End: 2024-09-10 | Stop reason: HOSPADM

## 2024-09-09 RX ORDER — FLUCONAZOLE 200 MG/1
400 TABLET ORAL DAILY
Qty: 60 TABLET | Refills: 1 | Status: SHIPPED | OUTPATIENT
Start: 2024-09-09 | End: 2024-09-09

## 2024-09-09 RX ORDER — EPLERENONE 25 MG/1
25 TABLET, FILM COATED ORAL DAILY
Qty: 30 TABLET | Refills: 11 | Status: SHIPPED | OUTPATIENT
Start: 2024-09-10 | End: 2024-09-17 | Stop reason: SDUPTHER

## 2024-09-09 RX ORDER — EPLERENONE 25 MG/1
25 TABLET, FILM COATED ORAL DAILY
Qty: 30 TABLET | Refills: 11 | Status: SHIPPED | OUTPATIENT
Start: 2024-09-10 | End: 2024-09-09

## 2024-09-09 RX ORDER — HYDRALAZINE HYDROCHLORIDE 25 MG/1
25 TABLET, FILM COATED ORAL ONCE
Status: COMPLETED | OUTPATIENT
Start: 2024-09-09 | End: 2024-09-09

## 2024-09-09 RX ADMIN — EPLERENONE 25 MG: 25 TABLET, FILM COATED ORAL at 09:09

## 2024-09-09 RX ADMIN — GABAPENTIN 400 MG: 400 CAPSULE ORAL at 09:09

## 2024-09-09 RX ADMIN — WARFARIN SODIUM 1 MG: 1 TABLET ORAL at 05:09

## 2024-09-09 RX ADMIN — DULOXETINE HYDROCHLORIDE 30 MG: 30 CAPSULE, DELAYED RELEASE ORAL at 09:09

## 2024-09-09 RX ADMIN — OXYCODONE HYDROCHLORIDE 10 MG: 10 TABLET, FILM COATED, EXTENDED RELEASE ORAL at 04:09

## 2024-09-09 RX ADMIN — CEFAZOLIN 2 G: 2 INJECTION, POWDER, FOR SOLUTION INTRAMUSCULAR; INTRAVENOUS at 09:09

## 2024-09-09 RX ADMIN — AMIODARONE HYDROCHLORIDE 200 MG: 200 TABLET ORAL at 09:09

## 2024-09-09 RX ADMIN — CEFAZOLIN 2 G: 2 INJECTION, POWDER, FOR SOLUTION INTRAMUSCULAR; INTRAVENOUS at 05:09

## 2024-09-09 RX ADMIN — INSULIN ASPART 8 UNITS: 100 INJECTION, SOLUTION INTRAVENOUS; SUBCUTANEOUS at 07:09

## 2024-09-09 RX ADMIN — ACETAMINOPHEN 650 MG: 325 TABLET ORAL at 09:09

## 2024-09-09 RX ADMIN — CYCLOBENZAPRINE HYDROCHLORIDE 5 MG: 5 TABLET, FILM COATED ORAL at 09:09

## 2024-09-09 RX ADMIN — FUROSEMIDE 80 MG: 80 TABLET ORAL at 09:09

## 2024-09-09 RX ADMIN — FLUCONAZOLE 400 MG: 2 INJECTION, SOLUTION INTRAVENOUS at 09:09

## 2024-09-09 RX ADMIN — INSULIN GLARGINE 12 UNITS: 100 INJECTION, SOLUTION SUBCUTANEOUS at 09:09

## 2024-09-09 RX ADMIN — ASPIRIN 81 MG: 81 TABLET, COATED ORAL at 09:09

## 2024-09-09 RX ADMIN — INSULIN ASPART 8 UNITS: 100 INJECTION, SOLUTION INTRAVENOUS; SUBCUTANEOUS at 04:09

## 2024-09-09 RX ADMIN — INSULIN ASPART 2 UNITS: 100 INJECTION, SOLUTION INTRAVENOUS; SUBCUTANEOUS at 07:09

## 2024-09-09 RX ADMIN — ATORVASTATIN CALCIUM 40 MG: 20 TABLET, FILM COATED ORAL at 09:09

## 2024-09-09 RX ADMIN — GABAPENTIN 100 MG: 100 CAPSULE ORAL at 09:09

## 2024-09-09 RX ADMIN — LEVETIRACETAM 1500 MG: 500 TABLET, FILM COATED ORAL at 09:09

## 2024-09-09 RX ADMIN — ACETAMINOPHEN 650 MG: 325 TABLET ORAL at 01:09

## 2024-09-09 RX ADMIN — AMLODIPINE BESYLATE 5 MG: 5 TABLET ORAL at 09:09

## 2024-09-09 RX ADMIN — GABAPENTIN 100 MG: 100 CAPSULE ORAL at 11:09

## 2024-09-09 RX ADMIN — INSULIN ASPART 8 UNITS: 100 INJECTION, SOLUTION INTRAVENOUS; SUBCUTANEOUS at 11:09

## 2024-09-09 RX ADMIN — Medication 400 MG: at 09:09

## 2024-09-09 RX ADMIN — HYDRALAZINE HYDROCHLORIDE 25 MG: 25 TABLET ORAL at 01:09

## 2024-09-09 RX ADMIN — PANTOPRAZOLE SODIUM 40 MG: 40 TABLET, DELAYED RELEASE ORAL at 09:09

## 2024-09-09 RX ADMIN — CEFAZOLIN 2 G: 2 INJECTION, POWDER, FOR SOLUTION INTRAMUSCULAR; INTRAVENOUS at 12:09

## 2024-09-09 RX ADMIN — INSULIN ASPART 2 UNITS: 100 INJECTION, SOLUTION INTRAVENOUS; SUBCUTANEOUS at 11:09

## 2024-09-09 NOTE — PROGRESS NOTES
Latest Reference Range & Units 09/08/24 19:40 09/08/24 20:54   POCT Glucose 70 - 110 mg/dL 78 100   After initial check of BBG. Patient was given apple juice and cereal. Recheck came back at 100. Informed MD, and a OTD of 10u insulin glargine was administered          Informed MD of 3569 Doppler. Patient asymptomatic with no complains of dizziness or headache. Instructed to recheck in 1 hour. OTD hydralazine 25mg po administered.    09/08/24 2358 09/09/24 0112 09/09/24 0113   Vital Signs   BP (!) 98/0 109/78 (!) 100/0   MAP (mmHg)  --  89  --    BP Location Left arm Left arm Left arm   BP Method Doppler Automatic Doppler   Patient Position Lying Lying Lying

## 2024-09-09 NOTE — PROGRESS NOTES
09/09/2024  Mirela Chris    Current provider:  Natalya Villatoro MD    Device interrogation:      9/9/2024     7:44 AM 9/9/2024     6:43 AM 9/8/2024    11:58 PM 9/8/2024     7:30 PM 9/8/2024     2:53 PM 9/8/2024     2:39 PM 9/8/2024     1:54 PM   TXP LVAD INTERROGATIONS   Type HeartMate3 HeartMate3 HeartMate3 HeartMate3 HeartMate3 HeartMate3 HeartMate3   Flow 3.9 3.6 3.9 3.9 3.6 4 4.1   Speed 5100 5100 5100 5100 5100 5100 5100   PI 6.3 7.3 6.7 5.9 6.5 5.5 5.5   Power (Serna) 3.6 3.6 3.6 3.6 3.5 3.6 3.6   LSL 4700   4700      Pulsatility Intermittent pulse   Intermittent pulse             Rounded on Kevan Queen to ensure all mechanical assist device settings (IABP or VAD) were appropriate and all parameters were within limits.  I was able to ensure all back up equipment was present, the staff had no issues, and the Perfusion Department daily rounding was complete.      For implantable VADs: Interrogation of Ventricular assist device was performed with analysis of device parameters and review of device function. I have personally reviewed the interrogation findings and agree with findings as stated.     In emergency, the nursing units have been notified to contact the perfusion department either by:  Calling [phone] from 630am to 4pm Mon thru Fri, utilizing the On-Call Finder functionality of Epic and searching for Perfusion, or by contacting the hospital  from 4pm to 630am and on weekends and asking to speak with the perfusionist on call.    8:06 AM

## 2024-09-09 NOTE — PROGRESS NOTES
[facility] - Cardiology Stepdown  Adult Nutrition  Progress Note    SUMMARY       Recommendations  1. Continue cardiac/ diabetic diet as tolerated     - double protein as medically feasible         2. Continue Boost Plus (vanilla) BID to optimize nutritional intake        3. RD following.    Goals: Meet % EEN/ ENP by f/u date  Nutrition Goal Status: goal met (ongoing)  Communication of RD Recs: other (POC)    Assessment and Plan    Endocrine  Moderate malnutrition  Malnutrition Type:  Context: acute illness or injury  Level: mild    Related to (etiology):   Poor energy intake    Signs and Symptoms (as evidenced by):   Weight Loss (Malnutrition): greater than 10% in 6 months  Energy Intake (Malnutrition): less than or equal to 50% for greater than or equal to 5 days  Subcutaneous Fat (Malnutrition): mild depletion  Muscle Mass (Malnutrition): mild depletion     Malnutrition Characteristic Summary:  Weight Loss (Malnutrition): greater than 10% in 6 months  Energy Intake (Malnutrition): less than or equal to 50% for greater than or equal to 5 days  Subcutaneous Fat (Malnutrition): mild depletion  Muscle Mass (Malnutrition): mild depletion      Interventions/Recommendations (treatment strategy):  1. Diabetic/ Cardiac diet as tolerated - [place] PO intake >50%. 2. If PO intake is <50%, recommend Boost GC BID to help meet needs. 3. RD following.    Nutrition Diagnosis Status:   New       Malnutrition Assessment  Malnutrition Context: acute illness or injury  Malnutrition Level: mild  Skin (Micronutrient): none  Nails (Micronutrient): none  Hair/Scalp (Micronutrient): none  Eyes (Micronutrient): none  Extraoral (Micronutrient): none  Gums (Micronutrient): none  Lips/Mucous Membranes (Micronutrient): none  Teeth (Micronutrient): none  Tongue (Micronutrient): none  Neck/Chest (Micronutrient): none  Musculoskeletal/Lower Extremities: none   Micronutrient Evaluation Summary: no deficiencies   Weight Loss (Malnutrition):  "greater than 10% in 6 months  Energy Intake (Malnutrition): less than or equal to 50% for greater than or equal to 5 days  Subcutaneous Fat (Malnutrition): mild depletion  Muscle Mass (Malnutrition): mild depletion   Orbital Region (Subcutaneous Fat Loss): well nourished  Upper Arm Region (Subcutaneous Fat Loss): mild depletion  Thoracic and Lumbar Region: mild depletion   [place] Region (Muscle Loss): well nourished  Clavicle Bone Region (Muscle Loss): moderate depletion  Clavicle and Acromion Bone Region (Muscle Loss): moderate depletion  Scapular Bone Region (Muscle Loss): well nourished  Dorsal Hand (Muscle Loss): well nourished  Patellar Region (Muscle Loss): well nourished  Anterior Thigh Region (Muscle Loss): mild depletion  Posterior Calf Region (Muscle Loss): mild depletion     Reason for Assessment    Reason For Assessment: RD follow-up  Diagnosis: cardiac disease (LVAD)  Relevant Medical History: CHF; LVAD; DM; Polysubstance abuse  Interdisciplinary Rounds: did not attend    General Information Comments: RD to see pt per f/u assessment. Pt with a good appetite w/ an average % PO intake x 1 week per EMR. Pt is also receiving Boost BID. Noted a significant weight loss x 7 months (33lbs.) per EMR (02/17 - 86.2 kg; 05/26 - 76.6kg; 08/26 - 70.8 kg).  Pt w/ a stable weight since prior RD visit (08/28 - 68.5kg and 09/09 - 68.8kg). NFPE completed on 08/26, observed to have moderate malnutrition, status improving (see PES statement above). DM diet ed given on 08/26. RD following.    Nutrition Discharge Planning: Diabetic/ Cardiac diet upon discharge. Diabetic diet education provided on 08/26.    Nutrition Risk Screen    Nutrition Risk Screen: no indicators present    Nutrition/Diet History    Spiritual, Cultural Beliefs, Confucianism Practices, Values that Affect Care: no  Food Allergies: NKFA    Anthropometrics    Temp: 98.6 °F (37 °C)  Height: 6' 3" (190.5 cm)  Height (inches): 75 in  Weight Method: Standard " Scale  Weight: 68.8 kg (151 lb 10.8 oz)  Weight (lb): 151.68 lb  Ideal Body Weight (IBW), Male: 196 lb  % Ideal Body Weight, Male (lb): 79.64 %  BMI (Calculated): 19     Lab/Procedures/Meds    Pertinent Labs Reviewed: reviewed  Pertinent Labs Comments: H/h 7.7/ 27.2; Na+ 129; Chloride 94; Glucose 142; BUN 31; Creatinine 1.5  Pertinent Medications Reviewed: reviewed  Pertinent Medications Comments: Atorvastatin; Furosemide; Insulin; Polyrthylene Glycol; Warfarin    Estimated/Assessed Needs    Weight Used For Calorie Calculations: 88.9 kg (196 lb) (IBW)  Energy Calorie Requirements (kcal): 2,361 (PAL 1.25)  Energy Need Method: [place]  Protein Requirements:  g/day (1.0-1.2 g/kg IBW)  Weight Used For Protein Calculations: 88.9 kg (196 lb) (IBW)  Fluid Requirements (mL): 1mL/kcal or per MD  Estimated Fluid Requirement Method: RDA Method  RDA Method (mL): 2  CHO Requirement: 295g/day    Nutrition Prescription Ordered    Current Diet Order: Diabetic/ Cardiac Diet  Oral Nutrition Supplement: Boost - Vanilla - BID    Evaluation of Received Nutrient/Fluid Intake    I/O: -21L -Since admit  Energy Calories Required: meeting needs  Protein Required: meeting needs  Fluid Required: meeting needs  Comments: LBM 09/06  Tolerance: tolerating  % Intake of Estimated Energy Needs: 75 - 100 %  % Meal Intake: 75 - 100 %    Nutrition Risk    Level of Risk/Frequency of Follow-up: low - moderate (1-2x/ week)     Monitor and Evaluation    Food and Nutrient Intake: energy intake, food and beverage intake  Food and Nutrient Adminstration: diet order  Knowledge/Beliefs/Attitudes: food and nutrition knowledge/skill, beliefs and attitudes  Physical Activity and Function: factors affecting access to physical activity, nutrition-related ADLs and IADLs  Anthropometric Measurements: height/length, weight, weight change, body mass index  Biochemical Data, Medical Tests and Procedures: electrolyte and renal panel, gastrointestinal  profile, inflammatory profile, glucose/endocrine profile, lipid profile  Nutrition-Focused Physical Findings: overall appearance, extremities, muscles and bones, head and eyes, skin     Nutrition Follow-Up    RD Follow-up?: Yes    Lisa Ross, Registration Eligible, Provisional LDN

## 2024-09-09 NOTE — PROGRESS NOTES
DISCHARGE NOTE:    Kevan Queen is a 39 y.o. male s/p HM3 lvad from 6.29.22 admitted for evaluation of back pain.     Past Medical History:   Diagnosis Date    Acute respiratory failure with hypoxia 07/22/2023    Arthritis     Awareness alteration, transient 09/01/2022    Cardiomyopathy     CHF (congestive heart failure) 10/01/2020    COVID-19 06/03/2023    Diabetes mellitus     Dilated cardiomyopathy 10/26/2020    Drug abuse 10/2020    Headache 04/19/2023    Hyperglycemia 12/16/2022    Hyperosmolar hyperglycemic state (HHS) 05/25/2022    ICD (implantable cardioverter-defibrillator) in place 10/26/2020    Infected defibrillator now s/p removal Nov 20232023 07/23/2023    Left ventricular assist device (LVAD) complication, initial encounter 06/05/2023    Muscle cramping 06/15/2022    Renal disorder     SOB (shortness of breath) 06/13/2022    Tingling in extremities 07/13/2022       Hospital Course: During his hospital stay Mr. Queen was started on broad spectrum antibiotics for suspected pyelonephritis and renal abscess coverage. He was evaluated by the ID team. Antibiotics were narrowed to 6 week courses of continuous infusion cefazolin and oral fluconazole.     He was also found to be in a. tach and a flutter and started on amiodarone. He completed a load followed by 200mg daily. His warfarin requirements have been less then his previous home dose. Plan to recommend warfarin 1.5mg orally daily on discharge.     Pharmacy Interventions/Recommendations:  1) INR Goal: 2-3    2) Antiplatelet Agents: ASA 81mg daily     3) Heparin Bridging:  UFH / LMWH     4) INR Follow-Up/Discharge Needs:  Thursday with [facility]     See list of discharge medication for dosing instructions.     Kevan Queen and his caregiver verbalized their understanding and had the opportunity to ask questions.      Discharge Medications:     Medication List        START taking these medications      amiodarone 200 MG  "Tab  Commonly known as: PACERONE  Take 1 tablet (200 mg total) by mouth once daily.  Start taking on: September 10, 2024     D5W PgBk 50 mL with ceFAZolin 2 gram SolR 6 g  Inject 6 g into the vein once daily.     eplerenone 25 MG Tab  Commonly known as: INSPRA  Take 1 tablet (25 mg total) by mouth once daily.  Start taking on: September 10, 2024     fluconazole 200 MG Tab  Commonly known as: DIFLUCAN  Take 2 tablets (400 mg total) by mouth once daily.            CHANGE how you take these medications      insulin detemir U-100 (Levemir) 100 unit/mL (3 mL) Inpn pen  Inject 12 Units into the skin 2 (two) times daily. In the morning and before bed.  What changed: how much to take     warfarin 3 MG tablet  Commonly known as: COUMADIN  Take 0.5 tablets (1.5 mg total) by mouth Daily.  What changed:   how much to take  additional instructions            CONTINUE taking these medications      acetaminophen 325 MG tablet  Commonly known as: TYLENOL  Take 2 tablets (650 mg total) by mouth every 6 (six) hours as needed for Pain.     amLODIPine 5 MG tablet  Commonly known as: NORVASC  Take 1 tablet (5 mg total) by mouth once daily.     aspirin 81 MG EC tablet  Commonly known as: ECOTRIN  Take 1 tablet (81 mg total) by mouth once daily.     atorvastatin 40 MG tablet  Commonly known as: LIPITOR  Take 1 tablet (40 mg total) by mouth once daily.     BD ULTRA-FINE GARTH PEN NEEDLE 32 gauge x 5/32" Ndle  Generic drug: pen needle, diabetic  1 each by Misc.(Non-Drug; Combo Route) route 4 (four) times daily.     blood sugar diagnostic Strp  Use to test blood glucose 4 (four) times daily.     blood-glucose meter Misc  Commonly known as: TRUE METRIX GLUCOSE METER  Use to test blood glucose 4 (four) times daily.     cyclobenzaprine 5 MG tablet  Commonly known as: FLEXERIL  Take 1 tablet (5 mg total) by mouth daily as needed for Muscle spasms. To be taken prior to daily lasix dose.     DULoxetine 30 MG capsule  Commonly known as: " CYMBALTA  Take 1 capsule (30 mg total) by mouth once daily.     FREESTYLE LUCIUS 3 SENSOR Dee  Generic drug: blood-glucose sensor  1 Device by Misc.(Non-Drug; Combo Route) route every 14 (fourteen) days.     furosemide 80 MG tablet  Commonly known as: LASIX  Take 1 tablet (80 mg total) by mouth once daily.     gabapentin 100 MG capsule  Commonly known as: NEURONTIN  Take 1 capsule (100 mg total) by mouth 2 (two) times daily AND 4 capsules (400 mg total) every evening.     insulin aspart U-100 100 unit/mL (3 mL) Inpn pen  Commonly known as: NovoLOG  Inject 18 Units into the skin 3 (three) times daily with meals: MAX 94 units/daily  150-200    201-250    251-300    301-350    >350  +2 units   +4 units    +6 units    +8 units    +10 units     lancets 33 gauge Misc  Use to test blood glucose 4 (four) times daily.     levETIRAcetam 1000 MG tablet  Commonly known as: KEPPRA  Take 1.5 tablets (1,500 mg total) by mouth 2 (two) times daily.     magnesium oxide 400 mg (241.3 mg magnesium) tablet  Commonly known as: MAG-OX  Take 1 tablet (400 mg total) by mouth once daily.     ondansetron 4 MG Tbdl  Commonly known as: ZOFRAN-ODT  Take 1 tablet (4 mg total) by mouth every 8 (eight) hours as needed (nausea).     pantoprazole 40 MG tablet  Commonly known as: PROTONIX  Take 1 tablet (40 mg total) by mouth once daily.     potassium chloride SA 10 MEQ tablet  Commonly known as: K-DUR,KLOR-CON M  Take 3 tablets (30 mEq total) by mouth once daily.            STOP taking these medications      cefadroxil 500 MG Cap  Commonly known as: DURICEF     milrinone 20mg/100ml D5W (200mcg/ml) 20 mg/100 mL (200 mcg/mL) infusion  Commonly known as: PRIMACOR     mirtazapine 15 MG tablet  Commonly known as: REMERON     spironolactone 25 MG tablet  Commonly known as: ALDACTONE            ASK your doctor about these medications      BAQSIMI 3 mg/actuation Spry  Generic drug: glucagon  1 each by Nasal route as needed (hypoglycemia).               Where  to Get Your Medications        These medications were sent to [id number] - [place] MARQUES - [address]  AT [facility] [address]  [address] [place] [address]      Phone: [phone]   amiodarone 200 MG Tab  eplerenone 25 MG Tab  fluconazole 200 MG Tab  warfarin 3 MG tablet       Information about where to get these medications is not yet available    Ask your nurse or doctor about these medications  D5W PgBk 50 mL with ceFAZolin 2 gram SolR 6 g  insulin aspart U-100 100 unit/mL (3 mL) Inpn pen  insulin detemir U-100 (Levemir) 100 unit/mL (3 mL) Inpn pen

## 2024-09-09 NOTE — PLAN OF CARE
Recommendations  1. Continue cardiac/ diabetic diet as tolerated     - double protein as medically feasible         2. Continue Boost Plus (vanilla) BID to optimize nutritional intake        3. RD following.    Goals: Meet % EEN/ ENP by f/u date  Nutrition Goal Status: goal met (ongoing)  Communication of RD Recs: other (POC)

## 2024-09-09 NOTE — SUBJECTIVE & OBJECTIVE
"Interval HPI:   No acute events overnight. Patient in room 319/319 A. Blood glucose  variable . BG at and below goal on current insulin regimen (SSI, prandial, and basal insulin ). Steroid use- None.    Renal function- Abnormal    Lab Results   Component Value Date    CREATININE 1.5 (H) 2024     Vasopressors-  None     Endocrine will continue to follow and manage insulin orders inpatient.     Diet diabetic Cardiac (Low Na/Chol); 2000 Calorie; Fluid - 1500mL; Standard Tray     Eatin%  Nausea: No  Hypoglycemia and intervention: No; per RIAN Valencia, BG 78 at . Apple juice and cereal given and recheck was 100 at . Per MD, Lantus decreased to 10 units at bedtime.  Fever: No  TPN and/or TF: No  If yes, type of TF/TPN and rate: N/A    BP (!) 98/0 (BP Location: Left arm, Patient Position: Lying)   Pulse 79   Temp 98.6 °F (37 °C) (Oral)   Resp 20   Ht 6' 3" (1.905 m)   Wt 68.8 kg (151 lb 10.8 oz)   SpO2 95%   BMI 18.96 kg/m²     Labs Reviewed and Include    Recent Labs   Lab 24  0535   *   CALCIUM 9.7   ALBUMIN 2.7*   PROT 9.2*   *   K 5.0   CO2 25   CL 94*   BUN 31*   CREATININE 1.5*   ALKPHOS 273*   ALT <5*   AST 27   BILITOT 1.7*     Lab Results   Component Value Date    WBC 8.94 2024    HGB 7.7 (L) 2024    HCT 27.2 (L) 2024    MCV 68 (L) 2024     (H) 2024     No results for input(s): "TSH", "FREET4" in the last 168 hours.  Lab Results   Component Value Date    HGBA1C 10.3 (H) 2024       Nutritional status:   Body mass index is 18.96 kg/m².  Lab Results   Component Value Date    ALBUMIN 2.7 (L) 2024    ALBUMIN 2.6 (L) 2024    ALBUMIN 2.6 (L) 2024     Lab Results   Component Value Date    PREALBUMIN 11 (L) 2024    PREALBUMIN 10 (L) 2024    PREALBUMIN 10 (L) 2024       Estimated Creatinine Clearance: 64.3 mL/min (A) (based on SCr of 1.5 mg/dL (H)).    Accu-Checks  Recent Labs     24  1553 " 09/07/24  1556 09/07/24  1937 09/07/24  1952 09/08/24  0658 09/08/24  1047 09/08/24  1533 09/08/24  1940 09/08/24  2054 09/09/24  0636   POCTGLUCOSE 65* 65* 159* 259* 157* 213* 102 78 100 160*       Current Medications and/or Treatments Impacting Glycemic Control  Immunotherapy:    Immunosuppressants       None          Steroids:   Hormones (From admission, onward)      None          Pressors:    Autonomic Drugs (From admission, onward)      None          Hyperglycemia/Diabetes Medications:   Antihyperglycemics (From admission, onward)      Start     Stop Route Frequency Ordered    09/09/24 0900  insulin glargine U-100 (Lantus) pen 12 Units         -- SubQ 2 times daily 09/08/24 2113    09/08/24 0715  insulin aspart U-100 pen 8 Units         -- SubQ 3 times daily with meals 09/07/24 1848    08/25/24 1506  insulin aspart U-100 pen 0-10 Units         -- SubQ Before meals & nightly PRN 08/25/24 1407

## 2024-09-09 NOTE — PROGRESS NOTES
[facility] - Cardiology Stepdown  Endocrinology  Progress Note    Admit Date: 8/25/2024     Reason for Consult: Management of T2DM, Hyperglycemia     Surgical Procedure and Date: LVAD 06/29/2022     Diabetes diagnosis year: 2022    Home Diabetes Medications:  Levemir 20 units twice daily and Novolog SSI (150/25) per patient    How often checking glucose at home?  Has not checked in a few weeks due to running out of strips    BG readings on regimen: COLLIN  Hypoglycemia on the regimen?  Yes; rarely experiences hypoglycemic symptoms such as blurry vision and vomiting. However, does not know his blood sugar level due to running out of supplies. He will self-correct with a snack.  Missed doses on regimen?  Yes, has not had insulin in a few weeks due to running out of glucometer supplies.    Diabetes Complications include:   Hyperglycemia    Complicating diabetes co morbidities:   History of MI, CHF, and CKD      HPI:   Patient is a 39 y.o. 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 He underwent RVAD removal and chest closure 6/30/2022.  He was weaned off  but restarted due to RVF. He was transitioned to milrinone (secondary to  shortage). History of unclear syncope vs seizures and followed by neuro and is on Keppra. On 11/15/23 underwent removal of eroded [employer] scICD--no Lifevest (due to LVAD) and no plan to replace ICD as not requiring therapy post LVAD with concern for reinfection. Reported back pain (primarily upper back pain) for the past 3-4 days. He reported that it started 2 days after stopping pirmacor and he felt it may be related. Reported some shortness of breath after walking long distances which has been ongoing for months now and has not changed lately. He has lost some weight over last few months since his  "but weight has been stable over the last month. Patient admitted after being out of [facility] for 1 week with ongoing back pain. Patient stated he has not taken his insulin in a few weeks due to running out of glucometer testing supplies. He previously had a William, but is not wearing one. BG >700 at [facility]. Endo consulted for BG management.      Interval HPI:   No acute events overnight. Patient in room 319/319 A. Blood glucose  variable . BG at and below goal on current insulin regimen (SSI, prandial, and basal insulin ). Steroid use- None.    Renal function- Abnormal    Lab Results   Component Value Date    CREATININE 1.5 (H) 2024     Vasopressors-  None     Endocrine will continue to follow and manage insulin orders inpatient.     Diet diabetic Cardiac (Low Na/Chol); 2000 Calorie; Fluid - 1500mL; Standard Tray     Eatin%  Nausea: No  Hypoglycemia and intervention: No; per RN Erik, BG 78 at . Apple juice and cereal given and recheck was 100 at . Per MD, Lantus decreased to 10 units at bedtime.  Fever: No  TPN and/or TF: No  If yes, type of TF/TPN and rate: N/A    BP (!) 98/0 (BP Location: Left arm, Patient Position: Lying)   Pulse 79   Temp 98.6 °F (37 °C) (Oral)   Resp 20   Ht 6' 3" (1.905 m)   Wt 68.8 kg (151 lb 10.8 oz)   SpO2 95%   BMI 18.96 kg/m²     Labs Reviewed and Include    Recent Labs   Lab 24  0535   *   CALCIUM 9.7   ALBUMIN 2.7*   PROT 9.2*   *   K 5.0   CO2 25   CL 94*   BUN 31*   CREATININE 1.5*   ALKPHOS 273*   ALT <5*   AST 27   BILITOT 1.7*     Lab Results   Component Value Date    WBC 8.94 2024    HGB 7.7 (L) 2024    HCT 27.2 (L) 2024    MCV 68 (L) 2024     (H) 2024     No results for input(s): "TSH", "FREET4" in the last 168 hours.  Lab Results   Component Value Date    HGBA1C 10.3 (H) 2024       Nutritional status:   Body mass index is 18.96 kg/m².  Lab Results   Component Value Date    " ALBUMIN 2.7 (L) 09/09/2024    ALBUMIN 2.6 (L) 09/06/2024    ALBUMIN 2.6 (L) 09/04/2024     Lab Results   Component Value Date    PREALBUMIN 11 (L) 09/09/2024    PREALBUMIN 10 (L) 09/06/2024    PREALBUMIN 10 (L) 09/04/2024       Estimated Creatinine Clearance: 64.3 mL/min (A) (based on SCr of 1.5 mg/dL (H)).    Accu-Checks  Recent Labs     09/07/24  1553 09/07/24  1556 09/07/24  1937 09/07/24  1952 09/08/24  0658 09/08/24  1047 09/08/24  1533 09/08/24  1940 09/08/24 2054 09/09/24  0636   POCTGLUCOSE 65* 65* 159* 259* 157* 213* 102 78 100 160*       Current Medications and/or Treatments Impacting Glycemic Control  Immunotherapy:    Immunosuppressants       None          Steroids:   Hormones (From admission, onward)      None          Pressors:    Autonomic Drugs (From admission, onward)      None          Hyperglycemia/Diabetes Medications:   Antihyperglycemics (From admission, onward)      Start     Stop Route Frequency Ordered    09/09/24 0900  insulin glargine U-100 (Lantus) pen 12 Units         -- SubQ 2 times daily 09/08/24 2113    09/08/24 0715  insulin aspart U-100 pen 8 Units         -- SubQ 3 times daily with meals 09/07/24 1848    08/25/24 1506  insulin aspart U-100 pen 0-10 Units         -- SubQ Before meals & nightly PRN 08/25/24 1407            ASSESSMENT and PLAN    Cardiac/Vascular  * LVAD (left ventricular assist device) present  Managed by primary team  Optimize blood glucose control        CHF (NYHA class IV, ACC/AHA stage D)  Managed by primary team  Optimize blood glucose control      Endocrine  Type 2 diabetes mellitus with hyperglycemia, with long-term current use of insulin  BG goal 140-180  Noncompliant T2DM.     Plan:   - Continue Lantus (Insulin Glargine) 12 units BID   - Continue Novolog (Insulin Aspart) 8 units TIDWM. HOLD if BG < 100  - Moderate dose correction (150/25)  - BG checks AC/HS  - Hypoglycemia protocol in place    ** Please notify Endocrine for any change and/or advance in  diet**  ** Please call Endocrine for any BG related issues **    Discharge Planning:   TBD. Please notify endocrinology prior to discharge.  Will likely discharge on current inpatient insulin regimen.  - Lantus 12 units twice daily  - Novolog 8 units TID with meals  Add correction scale if needed.  Blood sugar 150 to 200 add 2 units  Blood sugar 201 to 250 add 4 units  Blood sugar 251 to 300 add 6 units  Blood sugar 301 to 350 add 8 units  Blood sugar greater than 350 add 10 units    Patient requires glucometer and testing supplies.  Recommend he resumes his William for blood sugar monitoring.  BG logs with dosing instructions provided at bedside.  Instructed patient to submit BG logs for review in one week or sooner if experiencing persistent high (>200) or low (<100) blood sugars.  Patient prescriptions sent, patient has all necessary diabetic supplies.  Will send patient portal message to allow ongoing communication for all blood glucose related issues.  Will setup hospital discharge follow up appointment and DM education.  Reviewed topics related to DM including: the need for insulin, how insulin works, what makes it a high risk medication, the importance of follow up with either PCP or endocrine provider, importance of and how to check BG, how to record BG on logs, how to administer insulin, appropriate insulin administration sites, importance of rotating injection sites, hyper/hypoglycemia, how and when to treat hypoglycemia, when to hold insulin, how the correction scale works, importance of storing unused insulin in the refrigerator, and when to seek medical attention.  Patient verbalized understanding, answered all questions to patient's satisfaction.          Guera De Oliveira PA-C  Endocrinology  [facility]

## 2024-09-09 NOTE — ASSESSMENT & PLAN NOTE
BG goal 140-180  Noncompliant T2DM.     Plan:   - Continue Lantus (Insulin Glargine) 12 units BID   - Continue Novolog (Insulin Aspart) 8 units TIDWM. HOLD if BG < 100  - Moderate dose correction (150/25)  - BG checks AC/HS  - Hypoglycemia protocol in place    ** Please notify Endocrine for any change and/or advance in diet**  ** Please call Endocrine for any BG related issues **    Discharge Planning:   TBD. Please notify endocrinology prior to discharge.  Will likely discharge on current inpatient insulin regimen.  - Lantus 12 units twice daily  - Novolog 8 units TID with meals  Add correction scale if needed.  Blood sugar 150 to 200 add 2 units  Blood sugar 201 to 250 add 4 units  Blood sugar 251 to 300 add 6 units  Blood sugar 301 to 350 add 8 units  Blood sugar greater than 350 add 10 units    Patient requires glucometer and testing supplies.  Recommend he resumes his William for blood sugar monitoring.  BG logs with dosing instructions provided at bedside.  Instructed patient to submit BG logs for review in one week or sooner if experiencing persistent high (>200) or low (<100) blood sugars.  Patient prescriptions sent, patient has all necessary diabetic supplies.  Will send patient portal message to allow ongoing communication for all blood glucose related issues.  Will setup hospital discharge follow up appointment and DM education.  Reviewed topics related to DM including: the need for insulin, how insulin works, what makes it a high risk medication, the importance of follow up with either PCP or endocrine provider, importance of and how to check BG, how to record BG on logs, how to administer insulin, appropriate insulin administration sites, importance of rotating injection sites, hyper/hypoglycemia, how and when to treat hypoglycemia, when to hold insulin, how the correction scale works, importance of storing unused insulin in the refrigerator, and when to seek medical attention.  Patient verbalized  understanding, answered all questions to patient's satisfaction.

## 2024-09-09 NOTE — PROGRESS NOTES
Discharge    Pt presents in room aaox4 with open affect. Caregiver not present at this time. Pt voices agreement with plan to discharge to home today on IV ABX with [facility] and with [facility]. Pt will transport self home. Pt reports coping well and denies further needs, questions, concerns at this time and none indicated. Providing psychosocial and counseling support, education, resources, assistance and discharge planning as indicated. SW remains available.    [facility]  [phone]  [facility] ph [phone], fax [phone]

## 2024-09-10 ENCOUNTER — TELEPHONE (OUTPATIENT)
Dept: TRANSPLANT | Facility: CLINIC | Age: 39
End: 2024-09-10
Payer: MEDICAID

## 2024-09-10 VITALS
HEIGHT: 75 IN | HEART RATE: 84 BPM | TEMPERATURE: 98 F | WEIGHT: 151.69 LBS | OXYGEN SATURATION: 94 % | BODY MASS INDEX: 18.86 KG/M2 | RESPIRATION RATE: 18 BRPM | SYSTOLIC BLOOD PRESSURE: 90 MMHG

## 2024-09-10 PROCEDURE — G0180 MD CERTIFICATION HHA PATIENT: HCPCS | Mod: ,,, | Performed by: INTERNAL MEDICINE

## 2024-09-10 NOTE — PROGRESS NOTES
Pt with doppler of 96 and automatic BP of 117/80 (94). Pt asymptomatic. MD Ovalles made aware; no plans of intervention at this time. PT currently in room with call light and belongings within reach.

## 2024-09-10 NOTE — PROGRESS NOTES
Pt given discharge instructions. Medications and follow up appointments reviewed with the patient. All questions and concerns addressed. Pt verbalized understanding. IV and telemetry removed. Discharge paperwork given to patient. Pt left via wheelchair by PCT with belongings and LVAD emergency bag.

## 2024-09-10 NOTE — TELEPHONE ENCOUNTER
Robyn paged to say Kevan is going home and he needs dressing supplies. I reminded her this is not a reason to page and will have to call her back to discuss.   I asked MA to schedule pt for a 2 week visit where he will obtain supplies at that time.

## 2024-09-10 NOTE — NURSING
Plan of care reviewed with patient. Patient is AOX4. Patient remained free of falls and trauma, fall precautions are in place. Patient has no complaints of pain. Patient has no questions at this time. Wheels are locked and the bed is in lowest position. The call bell is within reach. Telemetry is on. Will continue to follow care.

## 2024-09-10 NOTE — TELEPHONE ENCOUNTER
Notified by MA,  RN called asking about patient's dressings as patient reported to them that we would not provide them to him on discharge. Returned call to patient's HH RN and explained that the on call RN MATEUS Mahoney discussed this with patient when they paged the emergency pager for this non emergent needs. She instructed him that we would try and arrange a 2 week follow up appointment as supplies are ordered through routine clinic visits. RN asked many questions about if patient does or does not do things. I informed her that they can notify us and encourage them to call their VAD team for needs related to the VAD. She verbalized understanding and agreement.

## 2024-09-12 NOTE — DISCHARGE SUMMARY
[facility] - Cardiology Stepdown  Heart Transplant  Discharge Summary      Patient Name: Kevan Queen  MRN: [id number]  Admission Date: 8/25/2024  Hospital Length of Stay: 16 days  Discharge Date and Time: 09/10/2024 11:20 AM  Attending Physician: Merry att. providers found   Discharging Provider: Fausto Reyes NP  Primary Care Provider: Rosio Armendariz, ORALIA     HPI:  39 year old male with stage D 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 He underwent RVAD removal and chest closure 6/30/2022.  He was weaned off  but he had to restarted due to RVF. He was eventually transitioned to milrinone (secondary to  shortage) now supposed to be on 0.25 mcg/kg/min. He has a history of unclear syncope vs seizures and is followed by neuro for this and is on Keppra.  He is being admitted after being out of [facility] for 1 week with ongoing back pain.      On 11/15/23 underwent removal of eroded [employer] scICD--no Lifevest (due to LVAD) and no plan to replace ICD as not requiring therapy post LVAD with concern for reinfection.  He has not had neurology f/u with seizure hx on keppra so coordinator to assist him with obtaining appt.     Reports back pain (primarily upper back pain) for the past 3-4 days. He reports that it started 2 days after stopping pirmacor and he feels it may be related. He has also been sleeping in the couch and identifies that it may also be related to the back pain. He denies lifting anything heavy or any falls /trauma. No red flags including incontinence of stool or urine. No numbness in the groin area or weakness in the legs or upper extremity reported. He reports he was not able to go for appointment because of transport issue that has now been resolved.  He denies orthopnea, PND, weight gain, leg swelling. He says that he does have some shortness of breath after walking long distances which has been ongoing for months now and has not changed lately. He has lost some weight over last few months since his but weight has been stable over the last month. He denied headache, constipation, diarrhea, SOB, cough, palpitations or any additional symptoms.      * No surgery found *     Hospital Course: Pt was admitted and cultured. ID was consulted and he was started on IV Abx. No abnormalities on X rays of back or CT spine w/ contrast. No evidence of discitis/bacteria seeding spine. Pt was found to have pyelonephritis on scan at R kidney (R cva tenderness present). Blood cultures grew yeast so Cefazolin continued and flucanazole started(micafungin stopped in favor of fluconozole due to persistent fungemia). IR was consulted but felt no need for intervention on abscess @ site of pyelo(too small). His pain was treated with multimodal regimen including gabapentin+lidocaine patches +tylenol + 1x daily oxy (prev drug use, avoiding opiates as able). Course was also complicated by one night episode of afl vs atach, required IV amio load. Had many GOC discussions but he does not want to be DNR/DNI, and wants to continue aggressive care. Pt counseled extensively on need for future compliance and transport to appointments. Tunneled PICC was placed and he was discharged home once IV Abx approved/set up. He can f/u as scheduled or prn.        Consults (From admission, onward)          Status Ordering Provider     Inpatient consult to Interventional Radiology  Once        Provider:  (Not yet assigned)    Completed SHRUTHI VILLALBA     Inpatient consult to PICC team (NIAS)  Once        Provider:  (Not yet assigned)    Completed SHRUTHI VILLALBA     Inpatient consult to Ophthalmology  Once        Provider:  (Not yet assigned)    Completed FRANKY PORTILLO     Inpatient consult to  Registered Dietitian/Nutritionist  Once        Provider:  (Not yet assigned)    Completed MARAGIRI, FRANKY R     Inpatient consult to Infectious Diseases  Once        Provider:  (Not yet assigned)    Completed MARAGIRI, FRANKY R     Inpatient consult to Palliative Care  Once        Provider:  (Not yet assigned)    Completed MARAGIRI, FRANKY R     Inpatient consult to Registered Dietitian/Nutritionist  Once        Provider:  (Not yet assigned)    Completed BEE, EDEN     Inpatient consult to Registered Dietitian/Nutritionist  Once        Provider:  (Not yet assigned)    Completed BEE, EDEN     Inpatient consult to Endocrinology  Once        Provider:  (Not yet assigned)    Completed BEE, EDEN            Pending Diagnostic Studies:       None          Final Active Diagnoses:    Diagnosis Date Noted POA    PRINCIPAL PROBLEM:  LVAD (left ventricular assist device) present [Z95.811] 06/30/2022 Not Applicable    Severe sepsis [A41.9, R65.20] 08/27/2024 Yes    Acute on chronic systolic CHF (congestive heart failure) [I50.23] 10/25/2020 Yes    Pulmonary nodules [R91.8] 08/31/2024 Unknown    Fungemia [B49] 08/31/2024 No    Pyelonephritis [N12] 08/30/2024 Unknown    Pain [R52] 08/28/2024 Unknown    Dyspnea [R06.00] 08/28/2024 Unknown    Advanced care planning/counseling discussion [Z71.89] 08/28/2024 Not Applicable    Atrial flutter [I48.92] 08/27/2024 Unknown    Subcutaneous nodule of breast [N63.0] 08/26/2024 Unknown    Polysubstance abuse [F19.10] 08/26/2024 Unknown    Bilateral back pain [M54.9] 08/25/2024 Yes    Supratherapeutic INR [R79.1] 08/25/2024 Yes    Hypokalemia [E87.6] 05/21/2024 Yes    Type 2 diabetes mellitus with hyperglycemia, with long-term current use of insulin [E11.65, Z79.4] 04/21/2024 Not Applicable    Moderate malnutrition [E44.0] 09/05/2023 Yes    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) [T82.7XXA] 07/23/2023 Yes    Seizure-like activity [R56.9]  "07/20/2022 Yes    Palliative care encounter [Z51.5] 06/16/2022 Not Applicable    Chronic combined systolic and diastolic heart failure [I50.42]  Yes    CHF (NYHA class IV, ACC/AHA stage D) [I50.84]  Yes      Problems Resolved During this Admission:      Discharged Condition: fair    Disposition: Home or Self Care    Patient Instructions:      Nursing communication   Order Comments: Tunneled PICC ready for use     Medications:  Reconciled Home Medications:      Medication List        START taking these medications      amiodarone 200 MG Tab  Commonly known as: PACERONE  Take 1 tablet (200 mg total) by mouth once daily.     D5W PgBk 50 mL with ceFAZolin 2 gram SolR 6 g  Inject 6 g into the vein once daily.     eplerenone 25 MG Tab  Commonly known as: INSPRA  Take 1 tablet (25 mg total) by mouth once daily.     fluconazole 200 MG Tab  Commonly known as: DIFLUCAN  Take 2 tablets (400 mg total) by mouth once daily.            CHANGE how you take these medications      insulin detemir U-100 (Levemir) 100 unit/mL (3 mL) Inpn pen  Inject 12 Units into the skin 2 (two) times daily. In the morning and before bed.  What changed: how much to take     warfarin 3 MG tablet  Commonly known as: COUMADIN  Take 0.5 tablets (1.5 mg total) by mouth Daily.  What changed:   how much to take  additional instructions            CONTINUE taking these medications      acetaminophen 325 MG tablet  Commonly known as: TYLENOL  Take 2 tablets (650 mg total) by mouth every 6 (six) hours as needed for Pain.     amLODIPine 5 MG tablet  Commonly known as: NORVASC  Take 1 tablet (5 mg total) by mouth once daily.     aspirin 81 MG EC tablet  Commonly known as: ECOTRIN  Take 1 tablet (81 mg total) by mouth once daily.     atorvastatin 40 MG tablet  Commonly known as: LIPITOR  Take 1 tablet (40 mg total) by mouth once daily.     BD ULTRA-FINE GARTH PEN NEEDLE 32 gauge x 5/32" Ndle  Generic drug: pen needle, diabetic  1 each by Misc.(Non-Drug; Combo " Route) route 4 (four) times daily.     blood sugar diagnostic Strp  Use to test blood glucose 4 (four) times daily.     blood-glucose meter Misc  Commonly known as: TRUE METRIX GLUCOSE METER  Use to test blood glucose 4 (four) times daily.     cyclobenzaprine 5 MG tablet  Commonly known as: FLEXERIL  Take 1 tablet (5 mg total) by mouth daily as needed for Muscle spasms. To be taken prior to daily lasix dose.     DULoxetine 30 MG capsule  Commonly known as: CYMBALTA  Take 1 capsule (30 mg total) by mouth once daily.     FREESTYLE LUCIUS 3 SENSOR Dee  Generic drug: blood-glucose sensor  1 Device by Misc.(Non-Drug; Combo Route) route every 14 (fourteen) days.     furosemide 80 MG tablet  Commonly known as: LASIX  Take 1 tablet (80 mg total) by mouth once daily.     gabapentin 100 MG capsule  Commonly known as: NEURONTIN  Take 1 capsule (100 mg total) by mouth 2 (two) times daily AND 4 capsules (400 mg total) every evening.     insulin aspart U-100 100 unit/mL (3 mL) Inpn pen  Commonly known as: NovoLOG  Inject 18 Units into the skin 3 (three) times daily with meals: MAX 94 units/daily  150-200    201-250    251-300    301-350    >350  +2 units   +4 units    +6 units    +8 units    +10 units     lancets 33 gauge Misc  Use to test blood glucose 4 (four) times daily.     levETIRAcetam 1000 MG tablet  Commonly known as: KEPPRA  Take 1.5 tablets (1,500 mg total) by mouth 2 (two) times daily.     magnesium oxide 400 mg (241.3 mg magnesium) tablet  Commonly known as: MAG-OX  Take 1 tablet (400 mg total) by mouth once daily.     ondansetron 4 MG Tbdl  Commonly known as: ZOFRAN-ODT  Take 1 tablet (4 mg total) by mouth every 8 (eight) hours as needed (nausea).     pantoprazole 40 MG tablet  Commonly known as: PROTONIX  Take 1 tablet (40 mg total) by mouth once daily.     potassium chloride SA 10 MEQ tablet  Commonly known as: K-DUR,KLOR-CON M  Take 3 tablets (30 mEq total) by mouth once daily.            STOP taking these  medications      cefadroxil 500 MG Cap  Commonly known as: DURICEF     milrinone 20mg/100ml D5W (200mcg/ml) 20 mg/100 mL (200 mcg/mL) infusion  Commonly known as: PRIMACOR     mirtazapine 15 MG tablet  Commonly known as: REMERON     spironolactone 25 MG tablet  Commonly known as: ALDACTONE            ASK your doctor about these medications      BAQSIMI 3 mg/actuation Spry  Generic drug: glucagon  1 each by Nasal route as needed (hypoglycemia).              Fausto Reyes, NP  Heart Transplant  [facility] - Cardiology Step[facility]

## 2024-09-14 LAB
BACTERIA BLD CULT: ABNORMAL

## 2024-09-16 ENCOUNTER — TELEPHONE (OUTPATIENT)
Dept: TRANSPLANT | Facility: CLINIC | Age: 39
End: 2024-09-16
Payer: MEDICAID

## 2024-09-16 ENCOUNTER — SOCIAL WORK (OUTPATIENT)
Dept: TRANSPLANT | Facility: CLINIC | Age: 39
End: 2024-09-16
Payer: MEDICAID

## 2024-09-16 ENCOUNTER — DOCUMENTATION ONLY (OUTPATIENT)
Dept: TRANSPLANT | Facility: CLINIC | Age: 39
End: 2024-09-16
Payer: MEDICAID

## 2024-09-16 NOTE — NURSING
Records received from . Received prescriptions and patient has refills at [facility]. We are not signing for a wheelchair as patient was seen by PT/OT while inpatient and was not recommended for a wheelchair. Patient can f/u with PCP if needs additional DME.

## 2024-09-16 NOTE — PROGRESS NOTES
Requested by LEXX diaz to follow up with home health to ensure pt is seen at his mom's home.  Contacted pt's home health agency to notify pt will be staying with his mom.  TREY spoke with pt's nurse.  Home Health will see pt at his mom's home. Nurse had address.  [place] office to follow.   [facility] [phone]

## 2024-09-16 NOTE — TELEPHONE ENCOUNTER
Calling  nurse, Flash, back  at her request. She is not able to get in touch with patient, wife.  Flash was not able to get in touch with pt or wife for the visit today. She called yesterday and today. She went there today, car is in driveway, TV is on loud. She knocked, no one answered. Screed door is locked.  She said after Friday and today, they will not be able to care for pt and are discharging him.   Olegsusanna said he and the kids were home alone Friday and not sure who is in the house today.  I called [place] [employer] department [phone], spoke with Rosa.  I explained the situation and asked if it is possible for them to check on patient for us.  She said she will make the request.     1350: Called police station to check on pt.  Police said they went to the house and wife said he is in [employer].  Kids were still at the house. I called Robyn, she said he is with his mom.  I asked since when, Saturday.    1410: Called Malou, pts mom.  Malou reports she went to get Kevan Saturday after Robyn called her to report he was using bathroom all over the house.  Malou said she doesn't have any dressing supplies, only 2. She doesn't have any logs to document on like she did with her .  She said she had to go back to [place] last night to  the batteries stuff.    Malou said Kevan is so skinny, he can't walk b/c he is so weak.  I told her home health was not going out anymore and she asked not to cancel.  I asked social workers to set up home health at Malou's house. [address] [place] [address].    I asked where the kids were and Malou said still with Robyn. She said she can't take them right now, it's too much. Malou called Robyn's mom and talked with her about sharing custody. Malou said neither are in school so she has to try to figure that out. She said she doesn't want them split up but they need to be taken care of.      I called  nurse back, Flash, and updated her with details. She  said she will call the [place] office to see if they can see him at his mom's house.  I gave her Malou's address and phone number.

## 2024-09-17 ENCOUNTER — TELEPHONE (OUTPATIENT)
Dept: TRANSPLANT | Facility: CLINIC | Age: 39
End: 2024-09-17
Payer: MEDICAID

## 2024-09-17 ENCOUNTER — LAB REQUISITION (OUTPATIENT)
Dept: LAB | Facility: HOSPITAL | Age: 39
End: 2024-09-17
Payer: MEDICAID

## 2024-09-17 ENCOUNTER — ANTI-COAG VISIT (OUTPATIENT)
Dept: CARDIOLOGY | Facility: CLINIC | Age: 39
End: 2024-09-17
Payer: MEDICAID

## 2024-09-17 DIAGNOSIS — I50.23 ACUTE ON CHRONIC SYSTOLIC (CONGESTIVE) HEART FAILURE: ICD-10-CM

## 2024-09-17 DIAGNOSIS — Z95.811 LVAD (LEFT VENTRICULAR ASSIST DEVICE) PRESENT: Primary | ICD-10-CM

## 2024-09-17 DIAGNOSIS — Z95.811 LVAD (LEFT VENTRICULAR ASSIST DEVICE) PRESENT: ICD-10-CM

## 2024-09-17 DIAGNOSIS — F41.9 ANXIETY: ICD-10-CM

## 2024-09-17 DIAGNOSIS — R52 PAIN: ICD-10-CM

## 2024-09-17 LAB
ACANTHOCYTES (OLG): SLIGHT
ALBUMIN SERPL-MCNC: 2.3 G/DL (ref 3.5–5)
ALBUMIN/GLOB SERPL: 0.4 RATIO (ref 1.1–2)
ALP SERPL-CCNC: 219 UNIT/L (ref 40–150)
ALT SERPL-CCNC: <5 UNIT/L (ref 0–55)
ANION GAP SERPL CALC-SCNC: 11 MEQ/L
ANISOCYTOSIS BLD QL SMEAR: ABNORMAL
AST SERPL-CCNC: 30 UNIT/L (ref 5–34)
BASOPHILS # BLD AUTO: 0.04 X10(3)/MCL
BASOPHILS NFR BLD AUTO: 0.5 %
BILIRUB SERPL-MCNC: 1.3 MG/DL
BUN SERPL-MCNC: 12.9 MG/DL (ref 8.9–20.6)
CALCIUM SERPL-MCNC: 8.4 MG/DL (ref 8.4–10.2)
CHLORIDE SERPL-SCNC: 95 MMOL/L (ref 98–107)
CO2 SERPL-SCNC: 26 MMOL/L (ref 22–29)
CREAT SERPL-MCNC: 1.03 MG/DL (ref 0.73–1.18)
CREAT/UREA NIT SERPL: 13
CRP SERPL-MCNC: 21 MG/L
EOSINOPHIL # BLD AUTO: 0.12 X10(3)/MCL (ref 0–0.9)
EOSINOPHIL NFR BLD AUTO: 1.4 %
ERYTHROCYTE [DISTWIDTH] IN BLOOD BY AUTOMATED COUNT: 25.4 % (ref 11.5–17)
GFR SERPLBLD CREATININE-BSD FMLA CKD-EPI: >60 ML/MIN/1.73/M2
GLOBULIN SER-MCNC: 5.3 GM/DL (ref 2.4–3.5)
GLUCOSE SERPL-MCNC: 237 MG/DL (ref 74–100)
HCT VFR BLD AUTO: 27.3 % (ref 42–52)
HGB BLD-MCNC: 8 G/DL (ref 14–18)
IMM GRANULOCYTES # BLD AUTO: 0.03 X10(3)/MCL (ref 0–0.04)
IMM GRANULOCYTES NFR BLD AUTO: 0.4 %
INR PPP: 3.8 (ref 2–3)
LYMPHOCYTES # BLD AUTO: 0.86 X10(3)/MCL (ref 0.6–4.6)
LYMPHOCYTES NFR BLD AUTO: 10.3 %
MCH RBC QN AUTO: 19.4 PG (ref 27–31)
MCHC RBC AUTO-ENTMCNC: 29.3 G/DL (ref 33–36)
MCV RBC AUTO: 66.1 FL (ref 80–94)
MICROCYTES BLD QL SMEAR: ABNORMAL
MONOCYTES # BLD AUTO: 0.83 X10(3)/MCL (ref 0.1–1.3)
MONOCYTES NFR BLD AUTO: 9.9 %
NEUTROPHILS # BLD AUTO: 6.47 X10(3)/MCL (ref 2.1–9.2)
NEUTROPHILS NFR BLD AUTO: 77.5 %
NRBC BLD AUTO-RTO: 1 %
PLATELET # BLD AUTO: 208 X10(3)/MCL (ref 130–400)
PLATELET # BLD EST: ADEQUATE 10*3/UL
PLATELETS.RETICULATED NFR BLD AUTO: 3.7 % (ref 0.9–11.2)
PMV BLD AUTO: 9.6 FL (ref 7.4–10.4)
POIKILOCYTOSIS BLD QL SMEAR: ABNORMAL
POTASSIUM SERPL-SCNC: 3.4 MMOL/L (ref 3.5–5.1)
PROT SERPL-MCNC: 7.6 GM/DL (ref 6.4–8.3)
PROTHROMBIN TIME: 38 SECONDS (ref 11.7–14.5)
RBC # BLD AUTO: 4.13 X10(6)/MCL (ref 4.7–6.1)
SODIUM SERPL-SCNC: 132 MMOL/L (ref 136–145)
WBC # BLD AUTO: 8.35 X10(3)/MCL (ref 4.5–11.5)

## 2024-09-17 PROCEDURE — 80053 COMPREHEN METABOLIC PANEL: CPT | Performed by: INTERNAL MEDICINE

## 2024-09-17 PROCEDURE — 85025 COMPLETE CBC W/AUTO DIFF WBC: CPT | Performed by: INTERNAL MEDICINE

## 2024-09-17 PROCEDURE — 86140 C-REACTIVE PROTEIN: CPT | Performed by: INTERNAL MEDICINE

## 2024-09-17 PROCEDURE — 93793 ANTICOAG MGMT PT WARFARIN: CPT | Mod: ,,,

## 2024-09-17 PROCEDURE — 85610 PROTHROMBIN TIME: CPT | Performed by: INTERNAL MEDICINE

## 2024-09-17 RX ORDER — WARFARIN 3 MG/1
1.5 TABLET ORAL DAILY
Qty: 30 TABLET | Refills: 5 | Status: SHIPPED | OUTPATIENT
Start: 2024-09-17

## 2024-09-17 RX ORDER — FUROSEMIDE 80 MG/1
80 TABLET ORAL DAILY
Qty: 30 TABLET | Refills: 11 | Status: SHIPPED | OUTPATIENT
Start: 2024-09-17 | End: 2025-09-17

## 2024-09-17 RX ORDER — DULOXETIN HYDROCHLORIDE 30 MG/1
30 CAPSULE, DELAYED RELEASE ORAL DAILY
Qty: 30 CAPSULE | Refills: 11 | Status: SHIPPED | OUTPATIENT
Start: 2024-09-17 | End: 2025-09-17

## 2024-09-17 RX ORDER — POTASSIUM CHLORIDE 750 MG/1
30 TABLET, EXTENDED RELEASE ORAL DAILY
Qty: 90 TABLET | Refills: 3 | Status: SHIPPED | OUTPATIENT
Start: 2024-09-17

## 2024-09-17 RX ORDER — GABAPENTIN 100 MG/1
CAPSULE ORAL
Qty: 180 CAPSULE | Refills: 11 | Status: SHIPPED | OUTPATIENT
Start: 2024-09-17 | End: 2025-09-17

## 2024-09-17 RX ORDER — ATORVASTATIN CALCIUM 40 MG/1
40 TABLET, FILM COATED ORAL DAILY
Qty: 90 TABLET | Refills: 3 | Status: SHIPPED | OUTPATIENT
Start: 2024-09-17 | End: 2025-09-17

## 2024-09-17 RX ORDER — AMIODARONE HYDROCHLORIDE 200 MG/1
200 TABLET ORAL DAILY
Qty: 30 TABLET | Refills: 11 | Status: SHIPPED | OUTPATIENT
Start: 2024-09-17 | End: 2025-09-17

## 2024-09-17 RX ORDER — AMLODIPINE BESYLATE 5 MG/1
5 TABLET ORAL DAILY
Qty: 90 TABLET | Refills: 3 | Status: SHIPPED | OUTPATIENT
Start: 2024-09-17 | End: 2025-09-17

## 2024-09-17 RX ORDER — LEVETIRACETAM 1000 MG/1
1500 TABLET ORAL 2 TIMES DAILY
Qty: 60 TABLET | Refills: 11 | Status: SHIPPED | OUTPATIENT
Start: 2024-09-17

## 2024-09-17 RX ORDER — LANOLIN ALCOHOL/MO/W.PET/CERES
400 CREAM (GRAM) TOPICAL DAILY
Qty: 30 TABLET | Refills: 11 | Status: SHIPPED | OUTPATIENT
Start: 2024-09-17

## 2024-09-17 RX ORDER — PANTOPRAZOLE SODIUM 40 MG/1
40 TABLET, DELAYED RELEASE ORAL DAILY
Qty: 30 TABLET | Refills: 11 | Status: SHIPPED | OUTPATIENT
Start: 2024-09-17 | End: 2025-09-17

## 2024-09-17 RX ORDER — EPLERENONE 25 MG/1
25 TABLET, FILM COATED ORAL DAILY
Qty: 30 TABLET | Refills: 11 | Status: SHIPPED | OUTPATIENT
Start: 2024-09-17 | End: 2025-09-17

## 2024-09-17 NOTE — TELEPHONE ENCOUNTER
Called patient's Mother Malou to review labs, patient's K was noted to be low. Reviewed all medications with mother and patient was missing several including potassium, see below:    Patient has and is taking:  Lasix  Coumadin  Aspirin  Amiodarone  Gabapentin  Lipitor  Kepra    Mom has 3 bottles spironolactone, mirtazapine, and a third in which they cannot read    Patient is missing:  Amlodipine  Inpspra  Flucanazole  Pantoprazole  Flexeril  Magnesium- has bottles of old script at home  Potassium- has bottles of old script at home    Provided mom the numbers to the pharmacies that these medications are at for her to call and transfer to the pharmacy she would prefer them be at. Mom is going to call ans asked MA to assist in refills of  orders. Mother verbalized understanding and agreement

## 2024-09-17 NOTE — PROGRESS NOTES
Spoke with patient Darlyn Max  regarding recent INR results .  Patient questioned and verified correct dosage .1.5 mg daily  Reported non stop diarrhea as of last week , no appetite and no other recent changes .

## 2024-09-17 NOTE — PROGRESS NOTES
Patient advised of dose instructions and verbalized understanding.  Will recheck labs 9/19/24 w [facility] P:[phone] .  Orders faxed

## 2024-09-18 ENCOUNTER — CLINICAL SUPPORT (OUTPATIENT)
Dept: INFECTIOUS DISEASES | Facility: CLINIC | Age: 39
End: 2024-09-18
Payer: MEDICAID

## 2024-09-18 DIAGNOSIS — Z95.811 LVAD (LEFT VENTRICULAR ASSIST DEVICE) PRESENT: ICD-10-CM

## 2024-09-18 DIAGNOSIS — R19.7 DIARRHEA, UNSPECIFIED TYPE: ICD-10-CM

## 2024-09-18 DIAGNOSIS — B49 FUNGEMIA: Primary | ICD-10-CM

## 2024-09-18 PROCEDURE — 99213 OFFICE O/P EST LOW 20 MIN: CPT | Mod: 95,,, | Performed by: STUDENT IN AN ORGANIZED HEALTH CARE EDUCATION/TRAINING PROGRAM

## 2024-09-18 RX ORDER — FLUCONAZOLE 200 MG/1
400 TABLET ORAL DAILY
Qty: 56 TABLET | Refills: 0 | Status: SHIPPED | OUTPATIENT
Start: 2024-09-18 | End: 2024-10-16

## 2024-09-18 NOTE — PROGRESS NOTES
Established Patient - Audio Only Telehealth Visit     The patient location is: [place]  The chief complaint leading to consultation is: f/u  Visit type: Virtual visit with audio only (telephone)  Total time spent with patient: 20       The reason for the audio only service rather than synchronous audio and video virtual visit was related to technical difficulties or patient preference/necessity.     Each patient to whom I provide medical services by telemedicine is:  (1) informed of the relationship between the physician and patient and the respective role of any other health care provider with respect to management of the patient; and (2) notified that they may decline to receive medical services by telemedicine and may withdraw from such care at any time. Patient verbally consented to receive this service via voice-only telephone call.       HPI: 40 yo male with HM3 as DT c/b MSSA dlesi/bacteremia with recent admission for back pain found to have MSSA bacteremia/DLESI and Cparapsilosis fungemia. He was discharged on cefazolin and fluconazole, however, he unfortunately did not receive fluconazole after he left. He states he moved back in with his mother on 9/13 due to concerns for his welfare at his prior living arrangement. She states that he was missing a lot of his medications including fluconazole. She also states that he's had poor PO intake and diarrhea since moving in with her. Denied fevers or chills. Has HH coming to his mother's house for dressing changes and lab work.      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 Prior hospital work up notable for potential renal abscess/pyelonephritis, CT spine negative for OM, and TTE neg for IE; eye exam without evidence of ophthalmitis. Blood cx prior to discharge no growth to date.    -continue cefazolin 6g  continous infusion iv daily, maria isabel 10/9   -weekly blood work while on iv abx  -ordered fluconazole 400 mg daily to pt's preferred pharmacy, maria isabel 10/16  -spoke with HH, will check for Cdiff given abx exposure and diarrhea  -avoid loperamide in the mean time                   This service was not originating from a related E/M service provided within the previous 7 days nor will  to an E/M service or procedure within the next 24 hours or my soonest available appointment.  Prevailing standard of care was able to be met in this audio-only visit.

## 2024-09-19 ENCOUNTER — TELEPHONE (OUTPATIENT)
Dept: PALLIATIVE MEDICINE | Facility: CLINIC | Age: 39
End: 2024-09-19
Payer: MEDICAID

## 2024-09-19 ENCOUNTER — ANTI-COAG VISIT (OUTPATIENT)
Dept: CARDIOLOGY | Facility: CLINIC | Age: 39
End: 2024-09-19
Payer: MEDICAID

## 2024-09-19 ENCOUNTER — TELEPHONE (OUTPATIENT)
Dept: TRANSPLANT | Facility: CLINIC | Age: 39
End: 2024-09-19
Payer: MEDICAID

## 2024-09-19 DIAGNOSIS — Z95.811 LVAD (LEFT VENTRICULAR ASSIST DEVICE) PRESENT: Primary | ICD-10-CM

## 2024-09-19 LAB — INR PPP: 3.7

## 2024-09-19 PROCEDURE — 93793 ANTICOAG MGMT PT WARFARIN: CPT | Mod: ,,,

## 2024-09-19 NOTE — PROGRESS NOTES
INR not at goal. Medications, chart, and patient findings reviewed. See calendar for adjustments to dose and follow up plan.  Verbal result taken from ___Leni Espinosa - [phone] ______. PT/INR __3.7_____ Date drawn__9/19/24______ Hardcopy to be faxed.

## 2024-09-19 NOTE — PROGRESS NOTES
Patient mother advised of dose instructions and verbalized understanding.  Will recheck labs  9/23/24 w / [facility] P:[phone] fax [phone]  orders faxed

## 2024-09-19 NOTE — PROGRESS NOTES
Spoke with patient mother regarding recent INR results .  Patient questioned and verified correct dose but pt taken 1.5 mg 9/17/24 instead of holding dose . Reported no recent changes .

## 2024-09-19 NOTE — TELEPHONE ENCOUNTER
Page received from patient's fianceRobyn. Patient is SOB, weak and just overall not feeling well. He has not had his IV milrinone is about a week. Robyn has called 911 to bring him to the ER. Informed Dr. Griffin Pulido via phone.     04:21- page received from ER MD. Will have patient transferred to [facility] ER.

## 2024-09-20 ENCOUNTER — TELEPHONE (OUTPATIENT)
Dept: TRANSPLANT | Facility: CLINIC | Age: 39
End: 2024-09-20
Payer: MEDICAID

## 2024-09-20 NOTE — TELEPHONE ENCOUNTER
"Received a call from Robyn (Kevan's spouse) 45 min call mostly of her venting. I made her aware that I was documenting the call as we talked and she expressed understanding. Kevan was sent home went no dressing supplies following his most recent discharge, therefore his DLES has not been change since. She said that she thought HH was changing his dressing. Because they have no daily kits Robyn said that she is going to use his picc dressing to change his DLES dressing because she is scared.Robyn reports Thick slimy drainage, red, puss, & back pain. Ran out of abx yesterday. Called [facility], should be getting a shipment today. She has no clue if it's 12hr or 24hr bags. I have offered and scheduled him an appt for 9/26/24, currently attempting to have ID and palliative move their appts to the same date. Kevan also may have bowel incontinence - "I told Arleth Chatterjee ([facility]) and they told me to call 911" his butt his raw. She's using baby diaper rash cream & and another cream that is used in the nursing home but is unsure of the name. "Diaper rash" may actually be a staged bed sore. She doesn't know and is not welcomed by the mom unless she's bringing his meds. (Kevan is in a adult diaper now). 4 falls since d/c. Children still not in school (numerous excuses). She did call 911 friday, Ems got there and Kevan refused care. His vitals were abnormal and they suggested treatment. EMS made him sign a AMA and that's when Kevan left and went by his mom. It's a blame game here and Kevan's mom thinks he's dying. She is also an alcoholic and using drugs and now complaining bcuz Kevan is there and she can't get high.The mom is a drug head compulsive liar. The same living conditions that they are in is the same by his mom. She calls Robyn for everything because she don't know what's going on.     All alleged by Robyn  VCs notified  "

## 2024-09-20 NOTE — TELEPHONE ENCOUNTER
After different conversations this week with patient's family, notified that children are still not in school.  They do not have birth certificates to get them enrolled in school.  Adolfo is around 7 y.o. and Serenity is around 5 y.o.   I am concerned for their safety and as a mandated reported I notified child protective services today of my suspicion for neglect.  Was on hold for 40 minutes prior to speaking with a person.  Spoke with Celia. Intake number is [phone].

## 2024-09-23 ENCOUNTER — HOSPITAL ENCOUNTER (EMERGENCY)
Facility: HOSPITAL | Age: 39
Discharge: ANOTHER HOSPITAL | End: 2024-09-23
Attending: EMERGENCY MEDICINE
Payer: MEDICAID

## 2024-09-23 ENCOUNTER — HOSPITAL ENCOUNTER (INPATIENT)
Facility: HOSPITAL | Age: 39
LOS: 6 days | Discharge: HOME | DRG: 394 | End: 2024-09-29
Attending: INTERNAL MEDICINE | Admitting: INTERNAL MEDICINE
Payer: MEDICAID

## 2024-09-23 VITALS
OXYGEN SATURATION: 100 % | BODY MASS INDEX: 19.27 KG/M2 | RESPIRATION RATE: 17 BRPM | HEIGHT: 75 IN | TEMPERATURE: 99 F | WEIGHT: 155 LBS | HEART RATE: 85 BPM

## 2024-09-23 DIAGNOSIS — Z79.01 CHRONIC ANTICOAGULATION: ICD-10-CM

## 2024-09-23 DIAGNOSIS — T82.7XXA INFECTION ASSOCIATED WITH DRIVELINE OF LEFT VENTRICULAR ASSIST DEVICE (LVAD): Primary | ICD-10-CM

## 2024-09-23 DIAGNOSIS — N12 PYELONEPHRITIS: ICD-10-CM

## 2024-09-23 DIAGNOSIS — K62.5 RECTAL BLEEDING: Primary | ICD-10-CM

## 2024-09-23 DIAGNOSIS — Z79.4 TYPE 2 DIABETES MELLITUS WITH HYPERGLYCEMIA, WITH LONG-TERM CURRENT USE OF INSULIN: ICD-10-CM

## 2024-09-23 DIAGNOSIS — K62.5 RECTAL BLEEDING: ICD-10-CM

## 2024-09-23 DIAGNOSIS — Z95.811 LVAD (LEFT VENTRICULAR ASSIST DEVICE) PRESENT: ICD-10-CM

## 2024-09-23 DIAGNOSIS — I50.9 HEART FAILURE, UNSPECIFIED HF CHRONICITY, UNSPECIFIED HEART FAILURE TYPE: ICD-10-CM

## 2024-09-23 DIAGNOSIS — R73.9 HYPERGLYCEMIA: ICD-10-CM

## 2024-09-23 DIAGNOSIS — E87.6 HYPOKALEMIA: ICD-10-CM

## 2024-09-23 DIAGNOSIS — E11.65 TYPE 2 DIABETES MELLITUS WITH HYPERGLYCEMIA, WITH LONG-TERM CURRENT USE OF INSULIN: ICD-10-CM

## 2024-09-23 DIAGNOSIS — Z79.01 ANTICOAGULATED: ICD-10-CM

## 2024-09-23 DIAGNOSIS — I50.9 ACUTE DECOMPENSATED HEART FAILURE: ICD-10-CM

## 2024-09-23 DIAGNOSIS — K92.2 GIB (GASTROINTESTINAL BLEEDING): ICD-10-CM

## 2024-09-23 DIAGNOSIS — K92.1 HEMATOCHEZIA: ICD-10-CM

## 2024-09-23 DIAGNOSIS — K59.00 CONSTIPATION, UNSPECIFIED CONSTIPATION TYPE: ICD-10-CM

## 2024-09-23 LAB
ALBUMIN SERPL-MCNC: 2.5 G/DL (ref 3.5–5)
ALBUMIN/GLOB SERPL: 0.4 RATIO (ref 1.1–2)
ALP SERPL-CCNC: 264 UNIT/L (ref 40–150)
ALT SERPL-CCNC: 12 UNIT/L (ref 0–55)
ANION GAP SERPL CALC-SCNC: 11 MEQ/L
APTT PPP: 33.1 SECONDS (ref 23.2–33.7)
AST SERPL-CCNC: 40 UNIT/L (ref 5–34)
BACTERIA #/AREA URNS AUTO: ABNORMAL /HPF
BASOPHILS # BLD AUTO: 0.03 K/UL (ref 0–0.2)
BASOPHILS # BLD AUTO: 0.04 X10(3)/MCL
BASOPHILS NFR BLD AUTO: 0.4 %
BASOPHILS NFR BLD: 0.3 % (ref 0–1.9)
BILIRUB SERPL-MCNC: 1.3 MG/DL
BILIRUB UR QL STRIP.AUTO: NEGATIVE
BUN SERPL-MCNC: 13 MG/DL (ref 8.9–20.6)
CALCIUM SERPL-MCNC: 8.5 MG/DL (ref 8.4–10.2)
CHLORIDE SERPL-SCNC: 93 MMOL/L (ref 98–107)
CLARITY UR: CLEAR
CO2 SERPL-SCNC: 28 MMOL/L (ref 22–29)
COLOR UR AUTO: YELLOW
CREAT SERPL-MCNC: 1.33 MG/DL (ref 0.73–1.18)
CREAT/UREA NIT SERPL: 10
DIFFERENTIAL METHOD BLD: ABNORMAL
EOSINOPHIL # BLD AUTO: 0.05 X10(3)/MCL (ref 0–0.9)
EOSINOPHIL # BLD AUTO: 0.1 K/UL (ref 0–0.5)
EOSINOPHIL NFR BLD AUTO: 0.5 %
EOSINOPHIL NFR BLD: 0.9 % (ref 0–8)
ERYTHROCYTE [DISTWIDTH] IN BLOOD BY AUTOMATED COUNT: 25.7 % (ref 11.5–17)
ERYTHROCYTE [DISTWIDTH] IN BLOOD BY AUTOMATED COUNT: 25.8 % (ref 11.5–14.5)
GFR SERPLBLD CREATININE-BSD FMLA CKD-EPI: >60 ML/MIN/1.73/M2
GLOBULIN SER-MCNC: 5.9 GM/DL (ref 2.4–3.5)
GLUCOSE SERPL-MCNC: 312 MG/DL (ref 74–100)
GLUCOSE UR QL STRIP: ABNORMAL
GROUP & RH: NORMAL
HCT VFR BLD AUTO: 25.5 % (ref 40–54)
HCT VFR BLD AUTO: 29 % (ref 42–52)
HGB BLD-MCNC: 7.6 G/DL (ref 14–18)
HGB BLD-MCNC: 8.3 G/DL (ref 14–18)
HGB UR QL STRIP: ABNORMAL
IMM GRANULOCYTES # BLD AUTO: 0.02 K/UL (ref 0–0.04)
IMM GRANULOCYTES # BLD AUTO: 0.03 X10(3)/MCL (ref 0–0.04)
IMM GRANULOCYTES NFR BLD AUTO: 0.2 % (ref 0–0.5)
IMM GRANULOCYTES NFR BLD AUTO: 0.3 %
INDIRECT COOMBS: NORMAL
INR PPP: 2 (ref 0.8–1.2)
INR PPP: 2.1
KETONES UR QL STRIP: NEGATIVE
LEUKOCYTE ESTERASE UR QL STRIP: NEGATIVE
LYMPHOCYTES # BLD AUTO: 1.1 K/UL (ref 1–4.8)
LYMPHOCYTES # BLD AUTO: 1.39 X10(3)/MCL (ref 0.6–4.6)
LYMPHOCYTES NFR BLD AUTO: 14.5 %
LYMPHOCYTES NFR BLD: 12 % (ref 18–48)
MCH RBC QN AUTO: 19.5 PG (ref 27–31)
MCH RBC QN AUTO: 20.1 PG (ref 27–31)
MCHC RBC AUTO-ENTMCNC: 28.6 G/DL (ref 33–36)
MCHC RBC AUTO-ENTMCNC: 29.8 G/DL (ref 32–36)
MCV RBC AUTO: 67 FL (ref 82–98)
MCV RBC AUTO: 68.2 FL (ref 80–94)
MONOCYTES # BLD AUTO: 0.67 X10(3)/MCL (ref 0.1–1.3)
MONOCYTES # BLD AUTO: 0.8 K/UL (ref 0.3–1)
MONOCYTES NFR BLD AUTO: 7 %
MONOCYTES NFR BLD: 8.1 % (ref 4–15)
NEUTROPHILS # BLD AUTO: 7.3 K/UL (ref 1.8–7.7)
NEUTROPHILS # BLD AUTO: 7.4 X10(3)/MCL (ref 2.1–9.2)
NEUTROPHILS NFR BLD AUTO: 77.3 %
NEUTROPHILS NFR BLD: 78.5 % (ref 38–73)
NITRITE UR QL STRIP: NEGATIVE
NRBC BLD AUTO-RTO: 0 %
NRBC BLD-RTO: 0 /100 WBC
PH UR STRIP: 6.5 [PH]
PLATELET # BLD AUTO: 236 X10(3)/MCL (ref 130–400)
PLATELET # BLD AUTO: 251 K/UL (ref 150–450)
PLATELETS.RETICULATED NFR BLD AUTO: 6.1 % (ref 0.9–11.2)
PMV BLD AUTO: ABNORMAL FL
PMV BLD AUTO: ABNORMAL FL (ref 9.2–12.9)
POCT GLUCOSE: 251 MG/DL (ref 70–110)
POCT GLUCOSE: 263 MG/DL (ref 70–110)
POCT GLUCOSE: 334 MG/DL (ref 70–110)
POTASSIUM SERPL-SCNC: 3.3 MMOL/L (ref 3.5–5.1)
PROT SERPL-MCNC: 8.4 GM/DL (ref 6.4–8.3)
PROT UR QL STRIP: ABNORMAL
PROTHROMBIN TIME: 20.7 SEC (ref 9–12.5)
PROTHROMBIN TIME: 23.6 SECONDS (ref 12.5–14.5)
RBC # BLD AUTO: 3.79 M/UL (ref 4.6–6.2)
RBC # BLD AUTO: 4.25 X10(6)/MCL (ref 4.7–6.1)
RBC #/AREA URNS AUTO: ABNORMAL /HPF
SODIUM SERPL-SCNC: 132 MMOL/L (ref 136–145)
SP GR UR STRIP.AUTO: 1.02 (ref 1–1.03)
SPECIMEN OUTDATE: NORMAL
SQUAMOUS #/AREA URNS LPF: ABNORMAL /HPF
UROBILINOGEN UR STRIP-ACNC: 4
WBC # BLD AUTO: 9.35 K/UL (ref 3.9–12.7)
WBC # BLD AUTO: 9.58 X10(3)/MCL (ref 4.5–11.5)
WBC #/AREA URNS AUTO: ABNORMAL /HPF

## 2024-09-23 PROCEDURE — 25500020 PHARM REV CODE 255: Performed by: EMERGENCY MEDICINE

## 2024-09-23 PROCEDURE — 85730 THROMBOPLASTIN TIME PARTIAL: CPT | Performed by: STUDENT IN AN ORGANIZED HEALTH CARE EDUCATION/TRAINING PROGRAM

## 2024-09-23 PROCEDURE — 63600175 PHARM REV CODE 636 W HCPCS: Performed by: EMERGENCY MEDICINE

## 2024-09-23 PROCEDURE — 63600175 PHARM REV CODE 636 W HCPCS: Performed by: INTERNAL MEDICINE

## 2024-09-23 PROCEDURE — 99222 1ST HOSP IP/OBS MODERATE 55: CPT | Mod: ,,, | Performed by: PHYSICIAN ASSISTANT

## 2024-09-23 PROCEDURE — 25000003 PHARM REV CODE 250: Performed by: INTERNAL MEDICINE

## 2024-09-23 PROCEDURE — 80053 COMPREHEN METABOLIC PANEL: CPT | Performed by: STUDENT IN AN ORGANIZED HEALTH CARE EDUCATION/TRAINING PROGRAM

## 2024-09-23 PROCEDURE — 86900 BLOOD TYPING SEROLOGIC ABO: CPT | Performed by: STUDENT IN AN ORGANIZED HEALTH CARE EDUCATION/TRAINING PROGRAM

## 2024-09-23 PROCEDURE — 81001 URINALYSIS AUTO W/SCOPE: CPT | Performed by: STUDENT IN AN ORGANIZED HEALTH CARE EDUCATION/TRAINING PROGRAM

## 2024-09-23 PROCEDURE — 96372 THER/PROPH/DIAG INJ SC/IM: CPT | Performed by: EMERGENCY MEDICINE

## 2024-09-23 PROCEDURE — 93750 INTERROGATION VAD IN PERSON: CPT | Mod: ,,, | Performed by: INTERNAL MEDICINE

## 2024-09-23 PROCEDURE — 36415 COLL VENOUS BLD VENIPUNCTURE: CPT | Performed by: STUDENT IN AN ORGANIZED HEALTH CARE EDUCATION/TRAINING PROGRAM

## 2024-09-23 PROCEDURE — 99285 EMERGENCY DEPT VISIT HI MDM: CPT | Mod: 25

## 2024-09-23 PROCEDURE — 86850 RBC ANTIBODY SCREEN: CPT | Performed by: STUDENT IN AN ORGANIZED HEALTH CARE EDUCATION/TRAINING PROGRAM

## 2024-09-23 PROCEDURE — 96372 THER/PROPH/DIAG INJ SC/IM: CPT | Performed by: PHYSICIAN ASSISTANT

## 2024-09-23 PROCEDURE — 85610 PROTHROMBIN TIME: CPT | Performed by: STUDENT IN AN ORGANIZED HEALTH CARE EDUCATION/TRAINING PROGRAM

## 2024-09-23 PROCEDURE — 82962 GLUCOSE BLOOD TEST: CPT

## 2024-09-23 PROCEDURE — 86901 BLOOD TYPING SEROLOGIC RH(D): CPT | Performed by: STUDENT IN AN ORGANIZED HEALTH CARE EDUCATION/TRAINING PROGRAM

## 2024-09-23 PROCEDURE — 27000248 HC VAD-ADDITIONAL DAY

## 2024-09-23 PROCEDURE — 25000003 PHARM REV CODE 250: Performed by: PHYSICIAN ASSISTANT

## 2024-09-23 PROCEDURE — G0378 HOSPITAL OBSERVATION PER HR: HCPCS

## 2024-09-23 PROCEDURE — 63600175 PHARM REV CODE 636 W HCPCS: Performed by: PHYSICIAN ASSISTANT

## 2024-09-23 PROCEDURE — 20600001 HC STEP DOWN PRIVATE ROOM

## 2024-09-23 PROCEDURE — 63700000 PHARM REV CODE 250 ALT 637 W/O HCPCS: Performed by: PHYSICIAN ASSISTANT

## 2024-09-23 PROCEDURE — 85025 COMPLETE CBC W/AUTO DIFF WBC: CPT | Performed by: STUDENT IN AN ORGANIZED HEALTH CARE EDUCATION/TRAINING PROGRAM

## 2024-09-23 PROCEDURE — 96365 THER/PROPH/DIAG IV INF INIT: CPT | Mod: 59

## 2024-09-23 RX ORDER — ATORVASTATIN CALCIUM 20 MG/1
40 TABLET, FILM COATED ORAL DAILY
Status: DISCONTINUED | OUTPATIENT
Start: 2024-09-23 | End: 2024-09-29 | Stop reason: HOSPADM

## 2024-09-23 RX ORDER — GABAPENTIN 100 MG/1
100 CAPSULE ORAL DAILY
Status: DISCONTINUED | OUTPATIENT
Start: 2024-09-23 | End: 2024-09-29 | Stop reason: HOSPADM

## 2024-09-23 RX ORDER — FUROSEMIDE 10 MG/ML
80 INJECTION INTRAMUSCULAR; INTRAVENOUS 3 TIMES DAILY
Status: DISCONTINUED | OUTPATIENT
Start: 2024-09-23 | End: 2024-09-29 | Stop reason: HOSPADM

## 2024-09-23 RX ORDER — INSULIN ASPART 100 [IU]/ML
7 INJECTION, SOLUTION INTRAVENOUS; SUBCUTANEOUS
Status: DISCONTINUED | OUTPATIENT
Start: 2024-09-23 | End: 2024-09-24

## 2024-09-23 RX ORDER — PANTOPRAZOLE SODIUM 40 MG/1
40 TABLET, DELAYED RELEASE ORAL DAILY
Status: DISCONTINUED | OUTPATIENT
Start: 2024-09-23 | End: 2024-09-29 | Stop reason: HOSPADM

## 2024-09-23 RX ORDER — INSULIN ASPART 100 [IU]/ML
0-10 INJECTION, SOLUTION INTRAVENOUS; SUBCUTANEOUS EVERY 6 HOURS PRN
Status: DISCONTINUED | OUTPATIENT
Start: 2024-09-23 | End: 2024-09-23 | Stop reason: HOSPADM

## 2024-09-23 RX ORDER — ONDANSETRON 4 MG/1
4 TABLET, ORALLY DISINTEGRATING ORAL EVERY 8 HOURS PRN
Status: DISCONTINUED | OUTPATIENT
Start: 2024-09-23 | End: 2024-09-29 | Stop reason: HOSPADM

## 2024-09-23 RX ORDER — ACETAMINOPHEN 10 MG/ML
1000 INJECTION, SOLUTION INTRAVENOUS ONCE
Status: COMPLETED | OUTPATIENT
Start: 2024-09-23 | End: 2024-09-23

## 2024-09-23 RX ORDER — GLUCAGON 1 MG
1 KIT INJECTION
Status: DISCONTINUED | OUTPATIENT
Start: 2024-09-23 | End: 2024-09-29 | Stop reason: HOSPADM

## 2024-09-23 RX ORDER — LANOLIN ALCOHOL/MO/W.PET/CERES
400 CREAM (GRAM) TOPICAL DAILY
Status: DISCONTINUED | OUTPATIENT
Start: 2024-09-23 | End: 2024-09-29 | Stop reason: HOSPADM

## 2024-09-23 RX ORDER — GABAPENTIN 400 MG/1
400 CAPSULE ORAL NIGHTLY
Status: DISCONTINUED | OUTPATIENT
Start: 2024-09-23 | End: 2024-09-29 | Stop reason: HOSPADM

## 2024-09-23 RX ORDER — DULOXETIN HYDROCHLORIDE 30 MG/1
30 CAPSULE, DELAYED RELEASE ORAL DAILY
Status: DISCONTINUED | OUTPATIENT
Start: 2024-09-23 | End: 2024-09-29 | Stop reason: HOSPADM

## 2024-09-23 RX ORDER — IBUPROFEN 200 MG
16 TABLET ORAL
Status: DISCONTINUED | OUTPATIENT
Start: 2024-09-23 | End: 2024-09-23

## 2024-09-23 RX ORDER — IBUPROFEN 200 MG
24 TABLET ORAL
Status: DISCONTINUED | OUTPATIENT
Start: 2024-09-23 | End: 2024-09-29 | Stop reason: HOSPADM

## 2024-09-23 RX ORDER — GLUCAGON 1 MG
1 KIT INJECTION
Status: DISCONTINUED | OUTPATIENT
Start: 2024-09-23 | End: 2024-09-23

## 2024-09-23 RX ORDER — POTASSIUM CHLORIDE 7.45 MG/ML
10 INJECTION INTRAVENOUS
Status: COMPLETED | OUTPATIENT
Start: 2024-09-23 | End: 2024-09-23

## 2024-09-23 RX ORDER — POTASSIUM CHLORIDE 750 MG/1
30 CAPSULE, EXTENDED RELEASE ORAL DAILY
Status: DISCONTINUED | OUTPATIENT
Start: 2024-09-23 | End: 2024-09-29 | Stop reason: HOSPADM

## 2024-09-23 RX ORDER — ACETAMINOPHEN 325 MG/1
650 TABLET ORAL EVERY 6 HOURS PRN
Status: DISCONTINUED | OUTPATIENT
Start: 2024-09-23 | End: 2024-09-29 | Stop reason: HOSPADM

## 2024-09-23 RX ORDER — CYCLOBENZAPRINE HCL 5 MG
5 TABLET ORAL DAILY PRN
Status: DISCONTINUED | OUTPATIENT
Start: 2024-09-23 | End: 2024-09-29 | Stop reason: HOSPADM

## 2024-09-23 RX ORDER — AMIODARONE HYDROCHLORIDE 200 MG/1
200 TABLET ORAL DAILY
Status: DISCONTINUED | OUTPATIENT
Start: 2024-09-23 | End: 2024-09-29 | Stop reason: HOSPADM

## 2024-09-23 RX ORDER — LEVETIRACETAM 500 MG/1
1500 TABLET ORAL 2 TIMES DAILY
Status: DISCONTINUED | OUTPATIENT
Start: 2024-09-23 | End: 2024-09-29 | Stop reason: HOSPADM

## 2024-09-23 RX ORDER — AMLODIPINE BESYLATE 5 MG/1
5 TABLET ORAL DAILY
Status: DISCONTINUED | OUTPATIENT
Start: 2024-09-23 | End: 2024-09-29 | Stop reason: HOSPADM

## 2024-09-23 RX ORDER — IBUPROFEN 200 MG
16 TABLET ORAL
Status: DISCONTINUED | OUTPATIENT
Start: 2024-09-23 | End: 2024-09-29 | Stop reason: HOSPADM

## 2024-09-23 RX ORDER — INSULIN ASPART 100 [IU]/ML
0-5 INJECTION, SOLUTION INTRAVENOUS; SUBCUTANEOUS
Status: DISCONTINUED | OUTPATIENT
Start: 2024-09-23 | End: 2024-09-23

## 2024-09-23 RX ORDER — FLUCONAZOLE 100 MG/1
400 TABLET ORAL DAILY
Status: DISCONTINUED | OUTPATIENT
Start: 2024-09-23 | End: 2024-09-29 | Stop reason: HOSPADM

## 2024-09-23 RX ORDER — EPLERENONE 25 MG/1
25 TABLET, FILM COATED ORAL DAILY
Status: DISCONTINUED | OUTPATIENT
Start: 2024-09-23 | End: 2024-09-29 | Stop reason: HOSPADM

## 2024-09-23 RX ORDER — IBUPROFEN 200 MG
24 TABLET ORAL
Status: DISCONTINUED | OUTPATIENT
Start: 2024-09-23 | End: 2024-09-23

## 2024-09-23 RX ORDER — ASPIRIN 81 MG/1
81 TABLET ORAL DAILY
Status: DISCONTINUED | OUTPATIENT
Start: 2024-09-23 | End: 2024-09-24

## 2024-09-23 RX ORDER — INSULIN GLARGINE 100 [IU]/ML
21 INJECTION, SOLUTION SUBCUTANEOUS DAILY
Status: DISCONTINUED | OUTPATIENT
Start: 2024-09-23 | End: 2024-09-24

## 2024-09-23 RX ORDER — HYDROCORTISONE ACETATE 25 MG/1
25 SUPPOSITORY RECTAL 2 TIMES DAILY
Status: DISCONTINUED | OUTPATIENT
Start: 2024-09-23 | End: 2024-09-29 | Stop reason: HOSPADM

## 2024-09-23 RX ORDER — FUROSEMIDE 80 MG/1
80 TABLET ORAL DAILY
Status: DISCONTINUED | OUTPATIENT
Start: 2024-09-23 | End: 2024-09-23

## 2024-09-23 RX ORDER — INSULIN ASPART 100 [IU]/ML
0-10 INJECTION, SOLUTION INTRAVENOUS; SUBCUTANEOUS
Status: DISCONTINUED | OUTPATIENT
Start: 2024-09-23 | End: 2024-09-29 | Stop reason: HOSPADM

## 2024-09-23 RX ORDER — INSULIN ASPART 100 [IU]/ML
0-5 INJECTION, SOLUTION INTRAVENOUS; SUBCUTANEOUS EVERY 6 HOURS PRN
Status: DISCONTINUED | OUTPATIENT
Start: 2024-09-23 | End: 2024-09-23

## 2024-09-23 RX ADMIN — POTASSIUM CHLORIDE 30 MEQ: 750 CAPSULE, EXTENDED RELEASE ORAL at 03:09

## 2024-09-23 RX ADMIN — PANTOPRAZOLE SODIUM 40 MG: 40 TABLET, DELAYED RELEASE ORAL at 03:09

## 2024-09-23 RX ADMIN — Medication 400 MG: at 03:09

## 2024-09-23 RX ADMIN — POTASSIUM CHLORIDE 10 MEQ: 7.46 INJECTION, SOLUTION INTRAVENOUS at 06:09

## 2024-09-23 RX ADMIN — DULOXETINE HYDROCHLORIDE 30 MG: 30 CAPSULE, DELAYED RELEASE ORAL at 03:09

## 2024-09-23 RX ADMIN — FLUCONAZOLE 400 MG: 100 TABLET ORAL at 04:09

## 2024-09-23 RX ADMIN — LEVETIRACETAM 1500 MG: 500 TABLET, FILM COATED ORAL at 09:09

## 2024-09-23 RX ADMIN — FUROSEMIDE 80 MG: 10 INJECTION, SOLUTION INTRAVENOUS at 03:09

## 2024-09-23 RX ADMIN — ASPIRIN 81 MG: 81 TABLET, COATED ORAL at 03:09

## 2024-09-23 RX ADMIN — INSULIN ASPART 7 UNITS: 100 INJECTION, SOLUTION INTRAVENOUS; SUBCUTANEOUS at 05:09

## 2024-09-23 RX ADMIN — IOHEXOL 100 ML: 350 INJECTION, SOLUTION INTRAVENOUS at 04:09

## 2024-09-23 RX ADMIN — WARFARIN SODIUM 1.5 MG: 1 TABLET ORAL at 04:09

## 2024-09-23 RX ADMIN — INSULIN ASPART 8 UNITS: 100 INJECTION, SOLUTION INTRAVENOUS; SUBCUTANEOUS at 05:09

## 2024-09-23 RX ADMIN — GABAPENTIN 100 MG: 100 CAPSULE ORAL at 03:09

## 2024-09-23 RX ADMIN — INSULIN GLARGINE 21 UNITS: 100 INJECTION, SOLUTION SUBCUTANEOUS at 04:09

## 2024-09-23 RX ADMIN — ACETAMINOPHEN 1000 MG: 10 INJECTION, SOLUTION INTRAVENOUS at 06:09

## 2024-09-23 RX ADMIN — HYDROCORTISONE ACETATE 25 MG: 25 SUPPOSITORY RECTAL at 09:09

## 2024-09-23 RX ADMIN — HYDROCORTISONE ACETATE 25 MG: 25 SUPPOSITORY RECTAL at 03:09

## 2024-09-23 RX ADMIN — CEFAZOLIN 6 G: 10 INJECTION, POWDER, FOR SOLUTION INTRAVENOUS at 03:09

## 2024-09-23 RX ADMIN — AMLODIPINE BESYLATE 5 MG: 5 TABLET ORAL at 03:09

## 2024-09-23 RX ADMIN — INSULIN ASPART 3 UNITS: 100 INJECTION, SOLUTION INTRAVENOUS; SUBCUTANEOUS at 06:09

## 2024-09-23 RX ADMIN — EPLERENONE 25 MG: 25 TABLET, FILM COATED ORAL at 03:09

## 2024-09-23 RX ADMIN — AMIODARONE HYDROCHLORIDE 200 MG: 200 TABLET ORAL at 03:09

## 2024-09-23 RX ADMIN — FUROSEMIDE 80 MG: 10 INJECTION, SOLUTION INTRAVENOUS at 09:09

## 2024-09-23 RX ADMIN — ATORVASTATIN CALCIUM 40 MG: 20 TABLET, FILM COATED ORAL at 03:09

## 2024-09-23 RX ADMIN — GABAPENTIN 400 MG: 400 CAPSULE ORAL at 09:09

## 2024-09-23 NOTE — ED NOTES
Tiana from transfer facility called to adv pt has been accepted and they are waiting for a bed to become available and will call when bed is ready.

## 2024-09-23 NOTE — PLAN OF CARE
Problem: Adult Inpatient Plan of Care  Goal: Absence of Hospital-Acquired Illness or Injury  Outcome: Progressing  Goal: Optimal Comfort and Wellbeing  Outcome: Progressing     Problem: Sepsis/Septic Shock  Goal: Absence of Infection Signs and Symptoms  Outcome: Progressing     Problem: Coping Ineffective  Goal: Effective Coping  Outcome: Progressing

## 2024-09-23 NOTE — HPI
Reason for Consult: Management of T2DM, Hyperglycemia      Surgical Procedure and Date: LVAD 06/29/2022      Diabetes diagnosis year: 2022     Home Diabetes Medications:  Levemir 18 units twice daily and Novolog 10 units SSI (150/25) per patient  Last recs from recent admit,.   Lantus 12 units twice daily  - Novolog 8 units TID with meals  Add correction scale if needed.  Blood sugar 150 to 200 add 2 units  Blood sugar 201 to 250 add 4 units  Blood sugar 251 to 300 add 6 units  Blood sugar 301 to 350 add 8 units  Blood sugar greater than 350 add 10 units     How often checking glucose at home?  1-3x day  BG readings on regimen: 100s  Hypoglycemia on the regimen?  Not recently  Missed doses on regimen?  Yes     Diabetes Complications include:   Hyperglycemia     Complicating diabetes co morbidities:   History of MI, CHF, and CKD        HPI:   Patient is a 39 y.o. 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 He underwent RVAD removal and chest closure 6/30/2022.  Presented to the ED with c/o rectal bleeding.  Endocrine consulted for BG management

## 2024-09-23 NOTE — SUBJECTIVE & OBJECTIVE
Past Medical History:   Diagnosis Date    Acute respiratory failure with hypoxia 07/22/2023    Arthritis     Awareness alteration, transient 09/01/2022    Cardiomyopathy     CHF (congestive heart failure) 10/01/2020    COVID-19 06/03/2023    Diabetes mellitus     Dilated cardiomyopathy 10/26/2020    Drug abuse 10/2020    Headache 04/19/2023    Hyperglycemia 12/16/2022    Hyperosmolar hyperglycemic state (HHS) 05/25/2022    ICD (implantable cardioverter-defibrillator) in place 10/26/2020    Infected defibrillator now s/p removal Nov 2023 07/23/2023    Left ventricular assist device (LVAD) complication, initial encounter 06/05/2023    Muscle cramping 06/15/2022    Renal disorder     SOB (shortness of breath) 06/13/2022    Tingling in extremities 07/13/2022       Past Surgical History:   Procedure Laterality Date    APPLICATION OF WOUND VACUUM-ASSISTED CLOSURE DEVICE N/A 6/30/2022    Procedure: APPLICATION, WOUND VAC;  Surgeon: Luis F Paige MD;  Location: [facility] OR [address] [place] [address];  Service: Cardiovascular;  Laterality: N/A;  50 x 5 cm    CARDIAC DEFIBRILLATOR PLACEMENT      ECHOCARDIOGRAM,TRANSESOPHAGEAL  11/9/2023    Procedure: Transesophageal echo (GUILLERMO) intra-procedure log documentation;  Surgeon: Eron Ivy MD;  Location: [facility] EP LAB;  Service: Cardiology;;    EXTRACTION, ELECTRODE LEAD Left 11/9/2023    Procedure: EXTRACTION, ELECTRODE LEAD;  Surgeon: ADALID Leon MD;  Location: [facility] EP LAB;  Service: Cardiology;  Laterality: Left;  INFECTION, LEAD EXTRACTION, GUILLERMO, BSCI, ANES, EH,   *NO CTS BACKUP*    IMPLANTATION OF RIGHT VENTRICULAR ASSIST DEVICE (RVAD) N/A 6/29/2022    Procedure: INSERTION, RVAD;  Surgeon: Luis F Paige MD;  Location: [facility] OR [address] [place] [address];  Service: Cardiovascular;  Laterality: N/A;    INSERTION OF GRAFT TO PERICARDIUM Right 6/30/2022    Procedure: INSERTION-RIGHT VENTRICULAR ASSIST DEVICE;  Surgeon: Luis F Paige MD;  Location: [facility] OR [address] [place] [address];  Service: Cardiovascular;  Laterality:  Right;    IRRIGATION OF MEDIASTINUM  6/30/2022    Procedure: IRRIGATION, MEDIASTINUM;  Surgeon: Luis F Paige MD;  [facility];  Service: Cardiovascular;;    LEFT VENTRICULAR ASSIST DEVICE Left 6/23/2022    Procedure: INSERTION-LEFT VENTRICULAR ASSIST DEVICE;  Surgeon: Luis F Paige MD;  Location: [facility] OR [facility] F[facility];  Service: Cardiovascular;  Laterality: Left;    LEFT VENTRICULAR ASSIST DEVICE N/A 6/29/2022    Procedure: INSERTION-LEFT VENTRICULAR ASSIST DEVICE;  Surgeon: Luis F Paige MD;  Location: [facility] OR [facility] FLR;  Service: Cardiovascular;  Laterality: N/A;    REVISION OF SKIN POCKET FOR CARDIOVERTER-DEFIBRILLATOR  11/9/2023    Procedure: REVISION, SKIN POCKET, FOR CARDIOVERTER-DEFIBRILLATOR;  Surgeon: ADALID Leon MD;  Location: [facility] EP LAB;  Service: Cardiology;;    RIGHT HEART CATHETERIZATION Right 4/8/2022    Procedure: INSERTION, CATHETER, RIGHT HEART;  Surgeon: Luca Lopez Jr., MD;  Location: [facility] CATH LAB;  Service: Cardiology;  Laterality: Right;    RIGHT HEART CATHETERIZATION Right 4/19/2022    Procedure: INSERTION, CATHETER, RIGHT HEART;  Surgeon: Josh Pulido MD;  Location: [facility] CATH LAB;  Service: Cardiology;  Laterality: Right;    RIGHT HEART CATHETERIZATION Right 7/21/2022    Procedure: INSERTION, CATHETER, RIGHT HEART;  Surgeon: Dalia Crum MD;  Location: [facility] CATH LAB;  Service: Cardiology;  Laterality: Right;    RIGHT HEART CATHETERIZATION Right 10/31/2022    Procedure: INSERTION, CATHETER, RIGHT HEART;  Surgeon: Dalia Crum MD;  Location: [facility] CATH LAB;  Service: Cardiology;  Laterality: Right;    STERNAL WOUND CLOSURE N/A 6/30/2022    Procedure: CLOSURE, WOUND, STERNUM;  Surgeon: Luis F Paige MD;  [facility];  Service: Cardiovascular;  Laterality: N/A;       Review of patient's allergies indicates:   Allergen Reactions    Torsemide Hives       Current Facility-Administered Medications   Medication    acetaminophen tablet 650 mg     alteplase injection 2 mg    amiodarone tablet 200 mg    amLODIPine tablet 5 mg    aspirin EC tablet 81 mg    atorvastatin tablet 40 mg    ceFAZolin (ANCEF) 6 g in D5W 500 mL CONTINUOUS INFUSION    cyclobenzaprine tablet 5 mg    dextrose 10% bolus 125 mL 125 mL    dextrose 10% bolus 250 mL 250 mL    DULoxetine DR capsule 30 mg    eplerenone tablet 25 mg    fluconazole tablet 400 mg    furosemide tablet 80 mg    gabapentin capsule 100 mg    And    gabapentin capsule 400 mg    glucagon (human recombinant) injection 1 mg    glucose chewable tablet 16 g    glucose chewable tablet 24 g    hydrocortisone suppository 25 mg    insulin aspart U-100 pen 0-10 Units    insulin aspart U-100 pen 7 Units    insulin glargine U-100 (Lantus) pen 21 Units    levETIRAcetam tablet 1,500 mg    magnesium oxide tablet 400 mg    ondansetron disintegrating tablet 4 mg    pantoprazole EC tablet 40 mg    potassium chloride SA CR tablet 30 mEq    warfarin split tablet 1.5 mg     Family History       Problem Relation (Age of Onset)    Diverticulosis Brother    Heart attack Maternal Grandmother, Maternal Grandfather    Heart failure Father          Tobacco Use    Smoking status: Former     Current packs/day: 0.00     Average packs/day: 0.5 packs/day for 16.6 years (8.3 ttl pk-yrs)     Types: Cigarettes     Start date: 10/1/2004     Quit date: 4/23/2021     Years since quitting: 3.4    Smokeless tobacco: Never   Substance and Sexual Activity    Alcohol use: Not Currently    Drug use: Not Currently     Types: Marijuana, MDMA (Ecstacy)    Sexual activity: Yes     Partners: Female     Birth control/protection: None     Review of Systems   Constitutional:  Negative for chills, diaphoresis and fever.   HENT:  Negative for congestion.    Eyes:  Negative for visual disturbance.   Respiratory:  Negative for shortness of breath.    Cardiovascular:  Negative for chest pain.   Gastrointestinal:  Positive for rectal pain. Negative for abdominal pain,  constipation, diarrhea and vomiting.   Musculoskeletal:  Positive for back pain. Negative for neck pain.   Skin:  Negative for wound.   Neurological:  Positive for weakness (generalized). Negative for dizziness, light-headedness and headaches.   Psychiatric/Behavioral:  Negative for confusion.      Objective:     Vital Signs (Most Recent):  Temp: 98.1 °F (36.7 °C) (09/23/24 1015)  BP: (!) 84/0 (09/23/24 1245)  SpO2: 100 % (09/23/24 1015) Vital Signs (24h Range):  Temp:  [98.1 °F (36.7 °C)-98.5 °F (36.9 °C)] 98.1 °F (36.7 °C)  Pulse:  [81-86] 85  Resp:  [12-20] 17  SpO2:  [100 %] 100 %  BP: (80-84)/(0) 84/0     Patient Vitals for the past 72 hrs (Last 3 readings):   Weight   09/23/24 1331 74.5 kg (164 lb 3.9 oz)     Body mass index is 20.53 kg/m².    No intake or output data in the 24 hours ending 09/23/24 1343       Physical Exam  Vitals reviewed.   HENT:      Head: Normocephalic.      Nose: Nose normal.   Eyes:      Conjunctiva/sclera: Conjunctivae normal.   Neck:      Comments: +JVP to jawline sitting up   Cardiovascular:      Pulses: Normal pulses.   Pulmonary:      Effort: Pulmonary effort is normal.   Abdominal:      General: Abdomen is flat. There is no distension.      Tenderness: There is no abdominal tenderness. There is no guarding.   Musculoskeletal:         General: Normal range of motion.      Cervical back: Normal range of motion.      Right lower leg: No edema.      Left lower leg: No edema.   Skin:     General: Skin is warm.   Neurological:      General: No focal deficit present.      Mental Status: He is alert.   Psychiatric:         Mood and Affect: Mood normal.         Behavior: Behavior normal.         Thought Content: Thought content normal.         Judgment: Judgment normal.            Significant Labs:  CBC:  Recent Labs   Lab 09/17/24  1256 09/23/24  0205   WBC 8.35 9.58   RBC 4.13* 4.25*   HGB 8.0* 8.3*   HCT 27.3* 29.0*    236   MCV 66.1* 68.2*   MCH 19.4* 19.5*   MCHC 29.3* 28.6*  "    BNP:  No results for input(s): "BNP" in the last 168 hours.    Invalid input(s): "BNPTRIAGELBLO"  CMP:  Recent Labs   Lab 09/17/24  1256 09/23/24  0205   CALCIUM 8.4 8.5   ALBUMIN 2.3* 2.5*   * 132*   K 3.4* 3.3*   CO2 26 28   CL 95* 93*   BUN 12.9 13.0   CREATININE 1.03 1.33*   ALKPHOS 219* 264*   ALT <5 12   AST 30 40*   BILITOT 1.3 1.3      Coagulation:   Recent Labs   Lab 09/17/24  1256 09/19/24  0000 09/23/24  0205   INR 3.8* 3.7 2.1*   APTT  --   --  33.1     LDH:  No results for input(s): "LDH" in the last 72 hours.  Microbiology:  Microbiology Results (last 7 days)       ** No results found for the last 168 hours. **            I have reviewed all pertinent labs within the past 24 hours.    Diagnostic Results:  I have reviewed all pertinent imaging results/findings within the past 24 hours.    "

## 2024-09-23 NOTE — ED NOTES
Wife at bedside adv pt had a hemorrhoid two days ago protruding from anus and pt mother pushed the hemorrhoid back inside the pt.

## 2024-09-23 NOTE — ASSESSMENT & PLAN NOTE
-HeartMate 3 Implanted 6/23/2022 with early RV failure requiring RVAD with ProTek Duo   -Continue Coumadin,  Goal INR 2.0-3.0 . Therapeutic today.   -Antiplatelets ASA 81 mg  -LDH is stable overall today. Will continue to monitor daily.  -Speed set at 5100, LSL 4700 rpm  -Interrogation notable for no events  -Not listed for OHTx          Procedure: Device Interrogation Including analysis of device parameters  Current Settings: Ventricular Assist Device  Review of device function is stable/unstable stable        9/23/2024    12:49 PM 9/23/2024    10:26 AM 9/9/2024     7:53 PM 9/9/2024     5:11 PM 9/9/2024    11:25 AM 9/9/2024     7:44 AM 9/9/2024     6:43 AM   TXP LVAD INTERROGATIONS   Type HeartMate3 HeartMate3 HeartMate3 HeartMate3 HeartMate3 HeartMate3 HeartMate3   Flow 3.8 4.3 3.6 4 4.2 3.9 3.6   Speed 5100 5100 5100 5100 5100 5100 5100   PI 5.8 4.5 7.3 5.9 5.3 6.3 7.3   Power (Serna) 3.6 3.6 3.5 3.6 3.6 3.6 3.6   LSL 4700 4700 4700 4700 4700 4700    Pulsatility No Pulse No Pulse Intermittent pulse  Intermittent pulse Intermittent pulse

## 2024-09-23 NOTE — H&P
[facility] - [facility] Step[facility]  Heart Transplant  H&P    Patient Name: Kevan Queen  MRN: [id number]  Admission Date: 9/23/2024  Attending Physician: Dalia Crum MD  Primary Care Provider: Rosio Armendariz FNP  Principal Problem:<principal problem not specified>    Subjective:     History of Present Illness:   39 year old male with stage D 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  He was weaned off  but he had to restarted due to RVF and transitioned to milrinone (secondary to  shortage). Most recently admitted on 9/10-9/12 for pyelonephritis with Bcx positive for yeast. Had many GOC discussions but he did not want to be DNR/DNI, and wants to continue aggressive care. He was weaned off milrinone but was discharged with PICC for IV antibiotics. Transferred after presenting to the ED last night after one episode of bright red blood per rectum yesterday evening. Pt states he has had rectal pain for a few days. His mother found an external hemorrhoid and was able to reduce with manual pressure in the rectum. H/H stable. INR 2.1.     Past Medical History:   Diagnosis Date    Acute respiratory failure with hypoxia 07/22/2023    Arthritis     Awareness alteration, transient 09/01/2022    Cardiomyopathy     CHF (congestive heart failure) 10/01/2020    COVID-19 06/03/2023    Diabetes mellitus     Dilated cardiomyopathy 10/26/2020    Drug abuse 10/2020    Headache 04/19/2023    Hyperglycemia 12/16/2022    Hyperosmolar hyperglycemic state (HHS) 05/25/2022    ICD (implantable cardioverter-defibrillator) in place 10/26/2020    Infected defibrillator now s/p removal Nov 2023 07/23/2023    Left ventricular assist device  (LVAD) complication, initial encounter 06/05/2023    Muscle cramping 06/15/2022    Renal disorder     SOB (shortness of breath) 06/13/2022    Tingling in extremities 07/13/2022       Past Surgical History:   Procedure Laterality Date    APPLICATION OF WOUND VACUUM-ASSISTED CLOSURE DEVICE N/A 6/30/2022    Procedure: APPLICATION, WOUND VAC;  Surgeon: Luis F Paige MD;  Location: [facility] OR [facility] FLR;  Service: Cardiovascular;  Laterality: N/A;  50 x 5 cm    CARDIAC DEFIBRILLATOR PLACEMENT      ECHOCARDIOGRAM,TRANSESOPHAGEAL  11/9/2023    Procedure: Transesophageal echo (GUILLERMO) intra-procedure log documentation;  Surgeon: Eron Ivy MD;  Location: [facility] EP LAB;  Service: Cardiology;;    EXTRACTION, ELECTRODE LEAD Left 11/9/2023    Procedure: EXTRACTION, ELECTRODE LEAD;  Surgeon: ADALID Leon MD;  Location: [facility] EP LAB;  Service: Cardiology;  Laterality: Left;  INFECTION, LEAD EXTRACTION, GUILLERMO, BSCI, ANES, EH,   *NO CTS BACKUP*    IMPLANTATION OF RIGHT VENTRICULAR ASSIST DEVICE (RVAD) N/A 6/29/2022    Procedure: INSERTION, RVAD;  Surgeon: Luis F Paige MD;  [facility];  Service: Cardiovascular;  Laterality: N/A;    INSERTION OF GRAFT TO PERICARDIUM Right 6/30/2022    Procedure: INSERTION-RIGHT VENTRICULAR ASSIST DEVICE;  Surgeon: Luis F Paige MD;  [facility];  Service: Cardiovascular;  Laterality: Right;    IRRIGATION OF MEDIASTINUM  6/30/2022    Procedure: IRRIGATION, MEDIASTINUM;  Surgeon: Luis F Paige MD;  [facility];  Service: Cardiovascular;;    LEFT VENTRICULAR ASSIST DEVICE Left 6/23/2022    Procedure: INSERTION-LEFT VENTRICULAR ASSIST DEVICE;  Surgeon: Luis F Paige MD;  [facility];  Service: Cardiovascular;  Laterality: Left;    LEFT VENTRICULAR ASSIST DEVICE N/A 6/29/2022    Procedure: INSERTION-LEFT VENTRICULAR ASSIST DEVICE;  Surgeon: Luis F Paige MD;  [facility];  Service: Cardiovascular;  Laterality: N/A;     REVISION OF SKIN POCKET FOR CARDIOVERTER-DEFIBRILLATOR  11/9/2023    Procedure: REVISION, SKIN POCKET, FOR CARDIOVERTER-DEFIBRILLATOR;  Surgeon: ADALID Leon MD;  Location: [facility] EP LAB;  Service: Cardiology;;    RIGHT HEART CATHETERIZATION Right 4/8/2022    Procedure: INSERTION, CATHETER, RIGHT HEART;  Surgeon: Luca Lopez Jr., MD;  Location: [facility] CATH LAB;  Service: Cardiology;  Laterality: Right;    RIGHT HEART CATHETERIZATION Right 4/19/2022    Procedure: INSERTION, CATHETER, RIGHT HEART;  Surgeon: Josh Pulido MD;  Location: [facility] CATH LAB;  Service: Cardiology;  Laterality: Right;    RIGHT HEART CATHETERIZATION Right 7/21/2022    Procedure: INSERTION, CATHETER, RIGHT HEART;  Surgeon: Dalia Crum MD;  Location: [facility] CATH LAB;  Service: Cardiology;  Laterality: Right;    RIGHT HEART CATHETERIZATION Right 10/31/2022    Procedure: INSERTION, CATHETER, RIGHT HEART;  Surgeon: Dalia Crum MD;  Location: [facility] CATH LAB;  Service: Cardiology;  Laterality: Right;    STERNAL WOUND CLOSURE N/A 6/30/2022    Procedure: CLOSURE, WOUND, STERNUM;  Surgeon: Luis F Paige MD;  Location: [facility] OR [address] [place] [address];  Service: Cardiovascular;  Laterality: N/A;       Review of patient's allergies indicates:   Allergen Reactions    Torsemide Hives       Current Facility-Administered Medications   Medication    acetaminophen tablet 650 mg    alteplase injection 2 mg    amiodarone tablet 200 mg    amLODIPine tablet 5 mg    aspirin EC tablet 81 mg    atorvastatin tablet 40 mg    ceFAZolin (ANCEF) 6 g in D5W 500 mL CONTINUOUS INFUSION    cyclobenzaprine tablet 5 mg    dextrose 10% bolus 125 mL 125 mL    dextrose 10% bolus 250 mL 250 mL    DULoxetine DR capsule 30 mg    eplerenone tablet 25 mg    fluconazole tablet 400 mg    furosemide tablet 80 mg    gabapentin capsule 100 mg    And    gabapentin capsule 400 mg    glucagon (human recombinant) injection 1 mg    glucose chewable tablet 16 g    glucose chewable tablet 24  g    hydrocortisone suppository 25 mg    insulin aspart U-100 pen 0-10 Units    insulin aspart U-100 pen 7 Units    insulin glargine U-100 (Lantus) pen 21 Units    levETIRAcetam tablet 1,500 mg    magnesium oxide tablet 400 mg    ondansetron disintegrating tablet 4 mg    pantoprazole EC tablet 40 mg    potassium chloride SA CR tablet 30 mEq    warfarin split tablet 1.5 mg     Family History       Problem Relation (Age of Onset)    Diverticulosis Brother    Heart attack Maternal Grandmother, Maternal Grandfather    Heart failure Father          Tobacco Use    Smoking status: Former     Current packs/day: 0.00     Average packs/day: 0.5 packs/day for 16.6 years (8.3 ttl pk-yrs)     Types: Cigarettes     Start date: 10/1/2004     Quit date: 4/23/2021     Years since quitting: 3.4    Smokeless tobacco: Never   Substance and Sexual Activity    Alcohol use: Not Currently    Drug use: Not Currently     Types: Marijuana, MDMA (Ecstacy)    Sexual activity: Yes     Partners: Female     Birth control/protection: None     Review of Systems   Constitutional:  Negative for chills, diaphoresis and fever.   HENT:  Negative for congestion.    Eyes:  Negative for visual disturbance.   Respiratory:  Negative for shortness of breath.    Cardiovascular:  Negative for chest pain.   Gastrointestinal:  Positive for rectal pain. Negative for abdominal pain, constipation, diarrhea and vomiting.   Musculoskeletal:  Positive for back pain. Negative for neck pain.   Skin:  Negative for wound.   Neurological:  Positive for weakness (generalized). Negative for dizziness, light-headedness and headaches.   Psychiatric/Behavioral:  Negative for confusion.      Objective:     Vital Signs (Most Recent):  Temp: 98.1 °F (36.7 °C) (09/23/24 1015)  BP: (!) 84/0 (09/23/24 1245)  SpO2: 100 % (09/23/24 1015) Vital Signs (24h Range):  Temp:  [98.1 °F (36.7 °C)-98.5 °F (36.9 °C)] 98.1 °F (36.7 °C)  Pulse:  [81-86] 85  Resp:  [12-20] 17  SpO2:  [100 %] 100  "%  BP: (80-84)/(0) 84/0     Patient Vitals for the past 72 hrs (Last 3 readings):   Weight   09/23/24 1331 74.5 kg (164 lb 3.9 oz)     Body mass index is 20.53 kg/m².    No intake or output data in the 24 hours ending 09/23/24 1343       Physical Exam  Vitals reviewed.   HENT:      Head: Normocephalic.      Nose: Nose normal.   Eyes:      Conjunctiva/sclera: Conjunctivae normal.   Neck:      Comments: +JVP to jawline sitting up   Cardiovascular:      Pulses: Normal pulses.   Pulmonary:      Effort: Pulmonary effort is normal.   Abdominal:      General: Abdomen is flat. There is no distension.      Tenderness: There is no abdominal tenderness. There is no guarding.   Musculoskeletal:         General: Normal range of motion.      Cervical back: Normal range of motion.      Right lower leg: No edema.      Left lower leg: No edema.   Skin:     General: Skin is warm.   Neurological:      General: No focal deficit present.      Mental Status: He is alert.   Psychiatric:         Mood and Affect: Mood normal.         Behavior: Behavior normal.         Thought Content: Thought content normal.         Judgment: Judgment normal.            Significant Labs:  CBC:  Recent Labs   Lab 09/17/24  1256 09/23/24  0205   WBC 8.35 9.58   RBC 4.13* 4.25*   HGB 8.0* 8.3*   HCT 27.3* 29.0*    236   MCV 66.1* 68.2*   MCH 19.4* 19.5*   MCHC 29.3* 28.6*     BNP:  No results for input(s): "BNP" in the last 168 hours.    Invalid input(s): "BNPTRIAGELBLO"  CMP:  Recent Labs   Lab 09/17/24  1256 09/23/24  0205   CALCIUM 8.4 8.5   ALBUMIN 2.3* 2.5*   * 132*   K 3.4* 3.3*   CO2 26 28   CL 95* 93*   BUN 12.9 13.0   CREATININE 1.03 1.33*   ALKPHOS 219* 264*   ALT <5 12   AST 30 40*   BILITOT 1.3 1.3      Coagulation:   Recent Labs   Lab 09/17/24  1256 09/19/24  0000 09/23/24  0205   INR 3.8* 3.7 2.1*   APTT  --   --  33.1     LDH:  No results for input(s): "LDH" in the last 72 hours.  Microbiology:  Microbiology Results (last 7 days)  "      ** No results found for the last 168 hours. **            I have reviewed all pertinent labs within the past 24 hours.    Diagnostic Results:  I have reviewed all pertinent imaging results/findings within the past 24 hours.    Assessment/Plan:     Rectal bleeding  -Rectal bleeding x 1. External hemorrhoid noted by mother that she then reduced   -H/H stable  -Steroid cream     LVAD (left ventricular assist device) present  -HeartMate 3 Implanted 6/23/2022 with early RV failure requiring RVAD with ProTek Duo   -Continue Coumadin,  Goal INR 2.0-3.0 . Therapeutic today.   -Antiplatelets ASA 81 mg  -LDH is stable overall today. Will continue to monitor daily.  -Speed set at 5100, LSL 4700 rpm  -Interrogation notable for no events  -Not listed for OHTx          Procedure: Device Interrogation Including analysis of device parameters  Current Settings: Ventricular Assist Device  Review of device function is stable/unstable stable        9/23/2024    12:49 PM 9/23/2024    10:26 AM 9/9/2024     7:53 PM 9/9/2024     5:11 PM 9/9/2024    11:25 AM 9/9/2024     7:44 AM 9/9/2024     6:43 AM   TXP LVAD INTERROGATIONS   Type HeartMate3 HeartMate3 HeartMate3 HeartMate3 HeartMate3 HeartMate3 HeartMate3   Flow 3.8 4.3 3.6 4 4.2 3.9 3.6   Speed 5100 5100 5100 5100 5100 5100 5100   PI 5.8 4.5 7.3 5.9 5.3 6.3 7.3   Power (Serna) 3.6 3.6 3.5 3.6 3.6 3.6 3.6   LSL 4700 4700 4700 4700 4700 4700    Pulsatility No Pulse No Pulse Intermittent pulse  Intermittent pulse Intermittent pulse          Type 2 diabetes mellitus with hyperglycemia  -IDDM  -Endocrinology consulted         Denise Espinoza PA-C  Heart Transplant  [facility] - Cardiology Stepdown

## 2024-09-23 NOTE — SUBJECTIVE & OBJECTIVE
Interval HPI:   Admitted today. Patient in room 308/308 A.  BG above goal on admit. Steroid use- None .      Renal function- Normal   Vasopressors-  None     Diet Cardiac Fluid - 1500mL     Eatin%  Nausea: No  Hypoglycemia and intervention: No  Fever: No  TPN and/or TF: No  Plate that    PMH, PSH, FH, SH updated and reviewed     ROS:    Review of Systems   Constitutional:  Negative for chills, fatigue and fever.   Gastrointestinal:  Negative for nausea.       Current Medications and/or Treatments Impacting Glycemic Control  Immunotherapy:    Immunosuppressants       None          Steroids:   Hormones (From admission, onward)      None          Pressors:    Autonomic Drugs (From admission, onward)      None          Hyperglycemia/Diabetes Medications:   Antihyperglycemics (From admission, onward)      Start     Stop Route Frequency Ordered    24 1326  insulin aspart U-100 pen 0-5 Units         -- SubQ Before meals & nightly PRN 24 1226             PHYSICAL EXAMINATION:  Vitals:    24 1245   BP: (!) 84/0   Temp:      There is no height or weight on file to calculate BMI.     Physical Exam  Constitutional:       General: He is not in acute distress.     Appearance: He is normal weight. He is not ill-appearing.   HENT:      Head: Normocephalic and atraumatic.   Psychiatric:         Mood and Affect: Mood normal.         Behavior: Behavior normal.

## 2024-09-23 NOTE — ASSESSMENT & PLAN NOTE
-Rectal bleeding x 1. External hemorrhoid noted by mother that she then reduced   -H/H stable  -Steroid cream

## 2024-09-23 NOTE — NURSING
Pt arrived to unit VSS and AOx4. LVAD coordinator Robby and HTS team made aware of pt arrival. LVAD inventory completed. Telemetry applied. Pt is oriented to room and call light use.

## 2024-09-23 NOTE — HPI
39 year old male with stage D 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  He was weaned off  but he had to restarted due to RVF and transitioned to milrinone (secondary to  shortage). Most recently admitted on 9/10-9/12 for pyelonephritis with Bcx positive for yeast. Had many GOC discussions but he did not want to be DNR/DNI, and wants to continue aggressive care. He was weaned off milrinone but was discharged with PICC for IV antibiotics. Transferred after presenting to the ED last night after one episode of bright red blood per rectum yesterday evening. Pt states he has had rectal pain for a few days. His mother found an external hemorrhoid and was able to reduce with manual pressure in the rectum. H/H stable. INR 2.1.

## 2024-09-23 NOTE — CONSULTS
[facility]  Endocrinology  Diabetes Consult Note    Consult Requested by: Dalia Crum MD   Reason for admit: <principal problem not specified>    HISTORY OF PRESENT ILLNESS:  Reason for Consult: Management of T2DM, Hyperglycemia      Surgical Procedure and Date: LVAD 2022      Diabetes diagnosis year:      Home Diabetes Medications:  Levemir 18 units twice daily and Novolog 10 units SSI (150/25) per patient  Last recs from recent admit,.   Lantus 12 units twice daily  - Novolog 8 units TID with meals  Add correction scale if needed.  Blood sugar 150 to 200 add 2 units  Blood sugar 201 to 250 add 4 units  Blood sugar 251 to 300 add 6 units  Blood sugar 301 to 350 add 8 units  Blood sugar greater than 350 add 10 units     How often checking glucose at home?  1-3x day  BG readings on regimen: 100s  Hypoglycemia on the regimen?  Not recently  Missed doses on regimen?  Yes     Diabetes Complications include:   Hyperglycemia     Complicating diabetes co morbidities:   History of MI, CHF, and CKD        HPI:   Patient is a 39 y.o. male with stage D CHF due to NICM, polysubstance abuse, DM, underwent DT-HM3 implantation 2022 with early RV failure requiring RVAD with ProTek Duo after admitted with ADHF/cardiogenic shock on home milrinone requiring IABP. He underwent RVAD removal and chest closure 2022.  Presented to the ED with c/o rectal bleeding.  Endocrine consulted for BG management              Interval HPI:   Admitted today. Patient in room 308/308 A.  BG above goal on admit. Steroid use- None .      Renal function- Normal   Vasopressors-  None     Diet Cardiac Fluid - 1500mL     Eatin%  Nausea: No  Hypoglycemia and intervention: No  Fever: No  TPN and/or TF: No  Plate that    PMH, PSH, FH, SH updated and reviewed     ROS:    Review of Systems   Constitutional:  Negative for chills, fatigue and fever.   Gastrointestinal:  Negative for nausea.       Current Medications  "and/or Treatments Impacting Glycemic Control  Immunotherapy:    Immunosuppressants       None          Steroids:   Hormones (From admission, onward)      None          Pressors:    Autonomic Drugs (From admission, onward)      None          Hyperglycemia/Diabetes Medications:   Antihyperglycemics (From admission, onward)      Start     Stop Route Frequency Ordered    09/23/24 1326  insulin aspart U-100 pen 0-5 Units         -- SubQ Before meals & nightly PRN 09/23/24 1226             PHYSICAL EXAMINATION:  Vitals:    09/23/24 1245   BP: (!) 84/0   Temp:      There is no height or weight on file to calculate BMI.     Physical Exam  Constitutional:       General: He is not in acute distress.     Appearance: He is normal weight. He is not ill-appearing.   HENT:      Head: Normocephalic and atraumatic.   Psychiatric:         Mood and Affect: Mood normal.         Behavior: Behavior normal.            Labs Reviewed and Include   Recent Labs   Lab 09/23/24  0205   CALCIUM 8.5   ALBUMIN 2.5*   *   K 3.3*   CO2 28   CL 93*   BUN 13.0   CREATININE 1.33*   ALKPHOS 264*   ALT 12   AST 40*   BILITOT 1.3     Lab Results   Component Value Date    WBC 9.58 09/23/2024    HGB 8.3 (L) 09/23/2024    HCT 29.0 (L) 09/23/2024    MCV 68.2 (L) 09/23/2024     09/23/2024     No results for input(s): "TSH", "FREET4" in the last 168 hours.  Lab Results   Component Value Date    HGBA1C 10.3 (H) 08/25/2024       Nutritional status:   Body mass index is 20.53 kg/m².  Lab Results   Component Value Date    ALBUMIN 2.5 (L) 09/23/2024    ALBUMIN 2.3 (L) 09/17/2024    ALBUMIN 2.7 (L) 09/09/2024     Lab Results   Component Value Date    PREALBUMIN 11 (L) 09/09/2024    PREALBUMIN 10 (L) 09/06/2024    PREALBUMIN 10 (L) 09/04/2024       Estimated Creatinine Clearance: 78.6 mL/min (A) (based on SCr of 1.33 mg/dL (H)).    Accu-Checks  Recent Labs     09/23/24  0558   POCTGLUCOSE 251*        ASSESSMENT and PLAN    Cardiac/Vascular  LVAD (left " ventricular assist device) present  Managed by primary team  Optimize BG control      Endocrine  Type 2 diabetes mellitus with hyperglycemia  BG goal: 140-180  T2DM.  History of LVAD.  Will start at weight based dose 0.6 -slight reduction of most recent hospital needs.    - -Start Lantus 21 units daily,   - Start Novolog 7 units with meals,   - Start Novolog /25  - POCT Glucose before meals and at bedtime  - Hypoglycemia protocol in place      ** Please notify Endocrine for any change and/or advance in diet**  ** Please call Endocrine for any BG related issues **     Discharge Planning:   TBD. Please notify endocrinology prior to discharge.      GI  Rectal bleeding    Managed by primary team  Optimize BG control        Plan discussed with patient, family, and RN at bedside.     Wilmer Quesada PA-C  Endocrinology  [facility] - [facility] Step[facility]

## 2024-09-23 NOTE — ASSESSMENT & PLAN NOTE
BG goal: 140-180  T2DM.  History of LVAD.  Will start at weight based dose 0.6 -slight reduction of most recent hospital needs.    - -Start Lantus 21 units daily,   - Start Novolog 7 units with meals,   - Start Novolog /25  - POCT Glucose before meals and at bedtime  - Hypoglycemia protocol in place      ** Please notify Endocrine for any change and/or advance in diet**  ** Please call Endocrine for any BG related issues **     Discharge Planning:   TBD. Please notify endocrinology prior to discharge.

## 2024-09-23 NOTE — PROGRESS NOTES
09/23/2024  Enrique NANDO Awan Jr    Current provider:  Marlo Marques MD    Device interrogation:      9/9/2024     7:53 PM 9/9/2024     5:11 PM 9/9/2024    11:25 AM 9/9/2024     7:44 AM 9/9/2024     6:43 AM 9/8/2024    11:58 PM 9/8/2024     7:30 PM   TXP LVAD INTERROGATIONS   Type HeartMate3 HeartMate3 HeartMate3 HeartMate3 HeartMate3 HeartMate3 HeartMate3   Flow 3.6 4 4.2 3.9 3.6 3.9 3.9   Speed 5100 5100 5100 5100 5100 5100 5100   PI 7.3 5.9 5.3 6.3 7.3 6.7 5.9   Power (Serna) 3.5 3.6 3.6 3.6 3.6 3.6 3.6   LSL 4700 4700 4700 4700   4700   Pulsatility Intermittent pulse  Intermittent pulse Intermittent pulse   Intermittent pulse          Rounded on Kevan Queen to ensure all mechanical assist device settings (IABP or VAD) were appropriate and all parameters were within limits.  I was able to ensure all back up equipment was present, the staff had no issues, and the Perfusion Department daily rounding was complete.      For implantable VADs: Interrogation of Ventricular assist device was performed with analysis of device parameters and review of device function. I have personally reviewed the interrogation findings and agree with findings as stated.     In emergency, the nursing units have been notified to contact the perfusion department either by:  Calling [phone] from 630am to 4pm Mon thru Fri, utilizing the On-Call Finder functionality of Epic and searching for Perfusion, or by contacting the hospital  from 4pm to 630am and on weekends and asking to speak with the perfusionist on call.    10:27 AM

## 2024-09-23 NOTE — ED PROVIDER NOTES
Encounter Date: 9/22/2024    SCRIBE #1 NOTE: I, Tara Roblero, am scribing for, and in the presence of,  Austni Anders MD. I have scribed the following portions of the note - Other sections scribed: HPI, ROS, PE.       History     Chief Complaint   Patient presents with    Rectal Bleeding     Pt arrives via [facility], EMS / Pt reports rectal bleeding that started tonight, Pt reports dark stool, ems reports that they saw the blood and that it was scant amount of bright red blood.      Patient is a 39-year-old male with a history of stage D CHF s/p LVAD placement, IDDM, on coumadin, presenting to the ED with c/o rectal bleeding. The pt reports bright red blood in stool onset today. He denies any lightheadedness, chest pain, shortness of breath, abdominal pain, or numbness. He also reports back pain onset a few months ago. He reports some difficulty ambulating secondary to the discomfort caused by his back pain but he is able to ambulate. No incontinence or saddle anesthesia.  Upon review of the medical chart, he was recently admitted at [facility], was evaluated for the pain, currently on treatment for pyelonephritis, no acute spinal issues on CT T/L spine.  He states he is compliant with his medications.  He did not take his insulin last night.    The history is provided by the patient. No  was used.     Review of patient's allergies indicates:   Allergen Reactions    Torsemide Hives     Past Medical History:   Diagnosis Date    Acute respiratory failure with hypoxia 07/22/2023    Arthritis     Awareness alteration, transient 09/01/2022    Cardiomyopathy     CHF (congestive heart failure) 10/01/2020    COVID-19 06/03/2023    Diabetes mellitus     Dilated cardiomyopathy 10/26/2020    Drug abuse 10/2020    Headache 04/19/2023    Hyperglycemia 12/16/2022    Hyperosmolar hyperglycemic state (HHS) 05/25/2022    ICD (implantable cardioverter-defibrillator) in place 10/26/2020    Infected defibrillator now  s/p removal Nov 20232023 07/23/2023    Left ventricular assist device (LVAD) complication, initial encounter 06/05/2023    Muscle cramping 06/15/2022    Renal disorder     SOB (shortness of breath) 06/13/2022    Tingling in extremities 07/13/2022     Past Surgical History:   Procedure Laterality Date    APPLICATION OF WOUND VACUUM-ASSISTED CLOSURE DEVICE N/A 6/30/2022    Procedure: APPLICATION, WOUND VAC;  Surgeon: Luis F Paige MD;  [facility];  Service: Cardiovascular;  Laterality: N/A;  50 x 5 cm    CARDIAC DEFIBRILLATOR PLACEMENT      ECHOCARDIOGRAM,TRANSESOPHAGEAL  11/9/2023    Procedure: Transesophageal echo (GUILLERMO) intra-procedure log documentation;  Surgeon: Eron Ivy MD;  Location: [facility] EP LAB;  Service: Cardiology;;    EXTRACTION, ELECTRODE LEAD Left 11/9/2023    Procedure: EXTRACTION, ELECTRODE LEAD;  Surgeon: ADALID Leon MD;  Location: [facility] EP LAB;  Service: Cardiology;  Laterality: Left;  INFECTION, LEAD EXTRACTION, GUILLERMO, BSCI, ANES, EH,   *NO CTS BACKUP*    IMPLANTATION OF RIGHT VENTRICULAR ASSIST DEVICE (RVAD) N/A 6/29/2022    Procedure: INSERTION, RVAD;  Surgeon: Luis F Paige MD;  [facility];  Service: Cardiovascular;  Laterality: N/A;    INSERTION OF GRAFT TO PERICARDIUM Right 6/30/2022    Procedure: INSERTION-RIGHT VENTRICULAR ASSIST DEVICE;  Surgeon: Luis F Paige MD;  [facility];  Service: Cardiovascular;  Laterality: Right;    IRRIGATION OF MEDIASTINUM  6/30/2022    Procedure: IRRIGATION, MEDIASTINUM;  Surgeon: Luis F Paige MD;  [facility];  Service: Cardiovascular;;    LEFT VENTRICULAR ASSIST DEVICE Left 6/23/2022    Procedure: INSERTION-LEFT VENTRICULAR ASSIST DEVICE;  Surgeon: Luis F Paige MD;  [address];  Service: Cardiovascular;  Laterality: Left;    LEFT VENTRICULAR ASSIST DEVICE N/A 6/29/2022    Procedure: INSERTION-LEFT VENTRICULAR ASSIST DEVICE;  Surgeon: Luis F Paige MD;  Location: [facility]  OR 2ND FLR;  Service: Cardiovascular;  Laterality: N/A;    REVISION OF SKIN POCKET FOR CARDIOVERTER-DEFIBRILLATOR  11/9/2023    Procedure: REVISION, SKIN POCKET, FOR CARDIOVERTER-DEFIBRILLATOR;  Surgeon: ADALID Leon MD;  Location: [facility] EP LAB;  Service: Cardiology;;    RIGHT HEART CATHETERIZATION Right 4/8/2022    Procedure: INSERTION, CATHETER, RIGHT HEART;  Surgeon: Luca Lopez Jr., MD;  Location: [facility] CATH LAB;  Service: Cardiology;  Laterality: Right;    RIGHT HEART CATHETERIZATION Right 4/19/2022    Procedure: INSERTION, CATHETER, RIGHT HEART;  Surgeon: Josh Pulido MD;  Location: [facility] CATH LAB;  Service: Cardiology;  Laterality: Right;    RIGHT HEART CATHETERIZATION Right 7/21/2022    Procedure: INSERTION, CATHETER, RIGHT HEART;  Surgeon: Dalia Crum MD;  Location: [facility] CATH LAB;  Service: Cardiology;  Laterality: Right;    RIGHT HEART CATHETERIZATION Right 10/31/2022    Procedure: INSERTION, CATHETER, RIGHT HEART;  Surgeon: Dalia Crum MD;  Location: [facility] CATH LAB;  Service: Cardiology;  Laterality: Right;    STERNAL WOUND CLOSURE N/A 6/30/2022    Procedure: CLOSURE, WOUND, STERNUM;  Surgeon: Luis F Paige MD;  Location: [facility] OR 2ND FLR;  Service: Cardiovascular;  Laterality: N/A;     Family History   Problem Relation Name Age of Onset    Heart failure Father      Diverticulosis Brother      Heart attack Maternal Grandmother      Heart attack Maternal Grandfather       Social History     Tobacco Use    Smoking status: Former     Current packs/day: 0.00     Average packs/day: 0.5 packs/day for 16.6 years (8.3 ttl pk-yrs)     Types: Cigarettes     Start date: 10/1/2004     Quit date: 4/23/2021     Years since quitting: 3.4    Smokeless tobacco: Never   Substance Use Topics    Alcohol use: Not Currently    Drug use: Not Currently     Types: Marijuana, MDMA (Ecstacy)     Review of Systems   Constitutional:  Negative for fever.   HENT:  Negative for rhinorrhea.    Respiratory:   Negative for cough and shortness of breath.    Cardiovascular:  Negative for chest pain.   Gastrointestinal:  Positive for blood in stool. Negative for abdominal pain, constipation, diarrhea, nausea and vomiting.   Genitourinary:  Negative for dysuria.   Musculoskeletal:  Negative for back pain and neck pain.   Skin:  Negative for rash.   Neurological:  Negative for weakness, light-headedness, numbness and headaches.       Physical Exam     Initial Vitals [09/23/24 0002]   BP Pulse Resp Temp SpO2   -- 83 18 98.5 °F (36.9 °C) 100 %      MAP       --         Physical Exam    Nursing note and vitals reviewed.  Constitutional: He appears well-nourished. He is not diaphoretic. No distress.   Chronically ill-appearing     HENT:   Head: Normocephalic and atraumatic.   Lips are dry.    Eyes: Pupils are equal, round, and reactive to light.   Pale conjunctivae   Neck: Neck supple.   Cardiovascular:  Normal rate and regular rhythm.           LVAD in place  Mechanical hum heard on auscultation of chest  No palpable pulses   Pulmonary/Chest: Breath sounds normal. No respiratory distress.   Abdominal: Abdomen is soft. Bowel sounds are normal. He exhibits no distension. There is no abdominal tenderness.   Genitourinary:    Genitourinary Comments: On rectal exam chaperoned by Anya Aleman RN: mass palpated in the rectal vault, possibly a hemorrhoid; clotted blood noted at the anal verge, no active bleeding. No stool palpated in the rectal vault, limited exam secondary to pain. Normal tone.     Musculoskeletal:         General: No edema. Normal range of motion.      Cervical back: Neck supple.      Comments: TTP bilateral lower back without midline TTP, step-off or deformity     Neurological: He is alert and oriented to person, place, and time. He has normal strength. No sensory deficit. GCS eye subscore is 4. GCS verbal subscore is 5. GCS motor subscore is 6.   Skin: Skin is warm and dry.   Psychiatric: He has a normal mood and  affect.         ED Course   Procedures  Labs Reviewed   COMPREHENSIVE METABOLIC PANEL - Abnormal       Result Value    Sodium 132 (*)     Potassium 3.3 (*)     Chloride 93 (*)     CO2 28      Glucose 312 (*)     Blood Urea Nitrogen 13.0      Creatinine 1.33 (*)     Calcium 8.5      Protein Total 8.4 (*)     Albumin 2.5 (*)     Globulin 5.9 (*)     Albumin/Globulin Ratio 0.4 (*)     Bilirubin Total 1.3       (*)     ALT 12      AST 40 (*)     eGFR >60      Anion Gap 11.0      BUN/Creatinine Ratio 10     PROTIME-INR - Abnormal    PT 23.6 (*)     INR 2.1 (*)    URINALYSIS, REFLEX TO URINE CULTURE - Abnormal    Color, UA Yellow      Appearance, UA Clear      Specific Gravity, UA 1.021      pH, UA 6.5      Protein, UA 2+ (*)     Glucose, UA 4+ (*)     Ketones, UA Negative      Blood, UA 1+ (*)     Bilirubin, UA Negative      Urobilinogen, UA 4.0 (*)     Nitrites, UA Negative      Leukocyte Esterase, UA Negative      RBC, UA 0-5      WBC, UA 0-5      Bacteria, UA None Seen      Squamous Epithelial Cells, UA None Seen     CBC WITH DIFFERENTIAL - Abnormal    WBC 9.58      RBC 4.25 (*)     Hgb 8.3 (*)     Hct 29.0 (*)     MCV 68.2 (*)     MCH 19.5 (*)     MCHC 28.6 (*)     RDW 25.7 (*)     Platelet 236      MPV        Neut % 77.3      Lymph % 14.5      Mono % 7.0      Eos % 0.5      Basophil % 0.4      Lymph # 1.39      Neut # 7.40      Mono # 0.67      Eos # 0.05      Baso # 0.04      IG# 0.03      IG% 0.3      NRBC% 0.0      IPF 6.1     POCT GLUCOSE - Abnormal    POCT Glucose 251 (*)    APTT - Normal    PTT 33.1     CBC W/ AUTO DIFFERENTIAL    Narrative:     The following orders were created for panel order CBC auto differential.  Procedure                               Abnormality         Status                     ---------                               -----------         ------                     CBC with Differential[[id number]]       Abnormal            Final result                 Please view results for  these tests on the individual orders.   TYPE & SCREEN    Group & Rh A POS      Indirect Marvin GEL NEG      Specimen Outdate 09/26/2024 23:59            Imaging Results              CT Abdomen Pelvis W Wo Contrast (Preliminary result)  Result time 09/23/24 04:33:59      Preliminary result by Casey Mott MD (09/23/24 04:33:59)                   Narrative:    START OF REPORT:  Technique: CT of the abdomen and pelvis was performed with axial images as well as sagittal and coronal reconstruction images with intravenous contrast.    Comparison: Comparison is with study dated 2024-08-28 17:50:38.    Clinical History: Rectal Bleeding (Pt arrives via [facility], EMS / Pt reports rectal bleeding that started tonight, Pt reports dark stool, ems reports that they saw the blood and that it was scant amount of bright red blood. ).    Dosage Information: Automated Exposure Control was utilized 1271.06 mGy.cm.    Findings:  Lines and Tubes: None.  Thorax:  Lungs: There is mild nonspecific dependent change at the lung bases. No focal infiltrate or consolidation is seen.  Pleura: No effusions or thickening.  Heart: Pronounced cardiomegaly is seen. The inferior vena cava and the hepatic veins are dilated with reflux of contrast suggesting cardiac insufficiency. A left ventricular assist device is noted.  Abdomen:  Abdominal Wall: There is extensive stranding of the subcutaneous fat as well as the intraabdominal fat associated with small volume ascites suggesting an element of anasarca edema possibly due to congestion.  Liver: The liver appears prominent with heterogeneous parenchyma attenuation and with a somewhat lobulated contour. This suggests hepatocellular disease possibly due to passive congestion.  Biliary System: No intrahepatic or extrahepatic biliary duct dilatation is seen.  Gallbladder: Subtle dependent hyperdensity is seen in the gallbladder which may represent sludge. The gallbladder otherwise appears  unremarkable.  Pancreas: The pancreas appears unremarkable.  Spleen: The spleen appears unremarkable.  Adrenals: The adrenal glands appear unremarkable.  Kidneys: A single stone measuring 4.5 mm is seen on Image 82, Series 3 in the lower pole of the left kidney. A single cyst measuring 1 cm is seen on Image 70, Series 4 in the mid pole of the left kidney. The left kidney otherwise appears unremarkable with no masses or hydronephrosis identified. There are prominent areas of severely diminished parenchymal enhancement in the right kidney as seen on series 10, image 77 and subtle areas of diminished cortical enhancement in the left kidney as noted on series 10 image 69. These findings suggest acute pyelonephritis with ischemic change not entirely exlcuded.  Aorta: There is minimal calcification of the abdominal aorta and its branches.  IVC: Unremarkable.  Bowel: No significant intraluminal hyperdense collection, contrast blush, contrast pooling or contrast extravasation is seen in the bowel to suggest acute gastrointestinal hemorrhage at the time of examination.  Esophagus: The visualized esophagus appears unremarkable.  Stomach: The stomach appears unremarkable.  Duodenum: Unremarkable appearing duodenum.  Small Bowel: The small bowel appears unremarkable.  Colon: There is pronounced distension of the colon filled with gas and a large amount of stool consistent with constipation. A large amount of stool is seen in the severely distended rectum associated with rectal wall thickening and mucosal enhancement. This suggests proctitis possibly due to fecal impaction.  Appendix: The appendix appears unremarkable and is partially seen on Image 119, Series 3.  Peritoneum: No free intraperitoneal air is seen. Mild intraperitoneal free fluid is seen in the perihepatic, perisplenic, right paracolic and pelvic recesses.    Pelvis:  Bladder: The bladder appears unremarkable.  Male:  Prostate gland: The prostate gland appears  unremarkable.    Bony structures:  Dorsal Spine: There is mild spondylosis of the visualized dorsal spine.  Bony Pelvis: There is mild degenerative change of the hip.      Impression:  1. There is extensive stranding of the subcutaneous fat as well as the intraabdominal fat associated with small volume ascites suggesting an element of anasarca edema possibly due to congestion.  2. Mild intraperitoneal free fluid is seen in the perihepatic, perisplenic, right paracolic and pelvic recesses.  3. There is pronounced distension of the colon filled with gas and a large amount of stool consistent with constipation. A large amount of stool is seen in the severely distended rectum associated with rectal wall thickening and mucosal enhancement. This suggests proctitis possibly due to fecal impaction. Correlate clinically as regards furtger evaluation and follow up.  4. The liver appears prominent with heterogeneous parenchyma attenuation and with a somewhat lobulated contour. This suggests hepatocellular disease possibly due to passive congestion. Correlate clinically as regards further evaluation and follow up.  5. No significant intraluminal hyperdense collection, contrast blush, contrast pooling or contrast extravasation is seen in the bowel to suggest acute gastrointestinal hemorrhage at the time of examination.  6. There are prominent areas of severely diminished parenchymal enhancement in the right kidney as seen on series 10, image 77 and subtle areas of diminished cortical enhancement in the left kidney as noted on series 10 image 69. These findings suggest acute pyelonephritis with ischemic change not entirely exlcuded. Correlate clinically as regards further evaluation and follow up.  7. Details and other findings as discussed above.                                         Medications   dextrose 10% bolus 125 mL 125 mL (has no administration in time range)   dextrose 10% bolus 250 mL 250 mL (has no administration in  time range)   insulin aspart U-100 injection 0-10 Units (3 Units Subcutaneous Given 9/23/24 0654)   iohexoL (OMNIPAQUE 350) injection 100 mL (100 mLs Intravenous Given 9/23/24 0405)   potassium chloride 10 mEq in 100 mL IVPB (10 mEq Intravenous New Bag 9/23/24 0656)   acetaminophen 1,000 mg/100 mL (10 mg/mL) injection 1,000 mg (0 mg Intravenous Stopped 9/23/24 0642)     Medical Decision Making  39-year-old male with history of CHF on LVAD, on Coumadin, here for rectal bleeding.  He is afebrile, hemodynamically stable, pale on exam.  Clots were noted at the anal verge with a palpable mass in the rectum, likely a hemorrhoid - exam limited.  Family tells me they saw a hemorrhoid recently and pushed it back into his rectum, possibly the cause of bleeding.  However considering he is on Coumadin with bright red blood noted, labs will be obtained and I do anticipate admission for continued evaluation and serial hemoglobins.  As he is an LVAD patient, he will need to be transferred to [facility].  CT scan of the abdomen and pelvis with and without contrast will be ordered as well to assess for active GI bleed in the case this needs to be stabilized here prior to transfer.  Reassess    Differential diagnosis include but are not limited to: polyp, hemorrhoid, colitis, supratherapeutic INR, malignancy, musculoskeletal pain, UTI and others.    Problems Addressed:  Rectal bleeding: acute illness or injury    Amount and/or Complexity of Data Reviewed  External Data Reviewed: labs and notes.  Labs: ordered. Decision-making details documented in ED Course.  Radiology: ordered and independent interpretation performed. Decision-making details documented in ED Course.    Risk  Decision regarding hospitalization.            Scribe Attestation:   Scribe #1: I performed the above scribed service and the documentation accurately describes the services I performed. I attest to the accuracy of the note.    Attending Attestation:  "          Physician Attestation for Scribe:  Physician Attestation Statement for Scribe #1: I, Tara Singh MD, reviewed documentation, as scribed by Tara Roblero in my presence, and it is both accurate and complete.             ED Course as of 09/23/24 0744   Mon Sep 23, 2024   0540 Labs remarkable for a glucose of 312.  Patient did not take his insulin prior to coming here last night.  Sliding scale has been ordered.  Hemoglobin is stable at 8.  He has not had a significant amount of BRBPR since arrival.  CT scan shows anasarca and a large amount of stool in the colon/rectum with possible impaction and associated procto-colitis.  Patient did not tolerate rectal exam due to pain.  Considering he is also bleeding and anticoagulated, I will hold off on repeat exam.  Lastly, CT shows pyelonephritis vs renal infarct; he is currently on treatment for pyelonephritis. I have discussed his case with the heart transplant team at [facility] and they have accepted him in transfer. Patient has been resting, no complaints, hemodynamically stable. He is amenable to transfer.    [RB]      ED Course User Index  [RB] Tara Singh MD          Pulse 85   Temp 98.5 °F (36.9 °C)   Resp 17   Ht 6' 3" (1.905 m)   Wt 70.3 kg (155 lb)   SpO2 100%   BMI 19.37 kg/m²                      Clinical Impression:  Final diagnoses:  [K62.5] Rectal bleeding (Primary)  [Z79.01] Chronic anticoagulation  [R73.9] Hyperglycemia  [E87.6] Hypokalemia  [I50.9] Heart failure, unspecified HF chronicity, unspecified heart failure type  [N12] Pyelonephritis  [K59.00] Constipation, unspecified constipation type          ED Disposition Condition    Transfer to Another Facility                 Tara Singh MD  09/23/24 0745    "

## 2024-09-23 NOTE — NURSING
Pt arrived to room 308 from outside hospital. No report was called from outside hospital to night shift nurse or this nurse. Day shift charge William made aware.

## 2024-09-24 ENCOUNTER — TELEPHONE (OUTPATIENT)
Dept: TRANSPLANT | Facility: CLINIC | Age: 39
End: 2024-09-24
Payer: MEDICAID

## 2024-09-24 ENCOUNTER — ANESTHESIA EVENT (OUTPATIENT)
Dept: ENDOSCOPY | Facility: HOSPITAL | Age: 39
End: 2024-09-24
Payer: MEDICAID

## 2024-09-24 PROBLEM — K92.1 HEMATOCHEZIA: Status: ACTIVE | Noted: 2024-09-24

## 2024-09-24 PROBLEM — Z79.01 ANTICOAGULATED: Status: ACTIVE | Noted: 2024-09-24

## 2024-09-24 LAB
ABO + RH BLD: NORMAL
ALLENS TEST: ABNORMAL
ANION GAP SERPL CALC-SCNC: 7 MMOL/L (ref 8–16)
APTT PPP: 37 SEC (ref 21–32)
BASOPHILS # BLD AUTO: 0.03 K/UL (ref 0–0.2)
BASOPHILS # BLD AUTO: 0.04 K/UL (ref 0–0.2)
BASOPHILS # BLD AUTO: 0.04 K/UL (ref 0–0.2)
BASOPHILS NFR BLD: 0.4 % (ref 0–1.9)
BASOPHILS NFR BLD: 0.4 % (ref 0–1.9)
BASOPHILS NFR BLD: 0.5 % (ref 0–1.9)
BLD GP AB SCN CELLS X3 SERPL QL: NORMAL
BLD PROD TYP BPU: NORMAL
BLOOD UNIT EXPIRATION DATE: NORMAL
BLOOD UNIT TYPE CODE: 6200
BLOOD UNIT TYPE: NORMAL
BUN SERPL-MCNC: 13 MG/DL (ref 6–20)
CALCIUM SERPL-MCNC: 8.6 MG/DL (ref 8.7–10.5)
CHLORIDE SERPL-SCNC: 95 MMOL/L (ref 95–110)
CK SERPL-CCNC: 51 U/L (ref 20–200)
CO2 SERPL-SCNC: 30 MMOL/L (ref 23–29)
CODING SYSTEM: NORMAL
CREAT SERPL-MCNC: 0.9 MG/DL (ref 0.5–1.4)
CROSSMATCH INTERPRETATION: NORMAL
DELSYS: ABNORMAL
DIFFERENTIAL METHOD BLD: ABNORMAL
DISPENSE STATUS: NORMAL
EOSINOPHIL # BLD AUTO: 0.1 K/UL (ref 0–0.5)
EOSINOPHIL NFR BLD: 0.9 % (ref 0–8)
EOSINOPHIL NFR BLD: 0.9 % (ref 0–8)
EOSINOPHIL NFR BLD: 1.1 % (ref 0–8)
ERYTHROCYTE [DISTWIDTH] IN BLOOD BY AUTOMATED COUNT: 25.4 % (ref 11.5–14.5)
ERYTHROCYTE [DISTWIDTH] IN BLOOD BY AUTOMATED COUNT: 25.4 % (ref 11.5–14.5)
ERYTHROCYTE [DISTWIDTH] IN BLOOD BY AUTOMATED COUNT: 25.5 % (ref 11.5–14.5)
EST. GFR  (NO RACE VARIABLE): >60 ML/MIN/1.73 M^2
FIO2: 21
GLUCOSE SERPL-MCNC: 256 MG/DL (ref 70–110)
HCT VFR BLD AUTO: 22.8 % (ref 40–54)
HCT VFR BLD AUTO: 24.1 % (ref 40–54)
HCT VFR BLD AUTO: 25.2 % (ref 40–54)
HGB BLD-MCNC: 6.6 G/DL (ref 14–18)
HGB BLD-MCNC: 6.8 G/DL (ref 14–18)
HGB BLD-MCNC: 7 G/DL (ref 14–18)
IMM GRANULOCYTES # BLD AUTO: 0.02 K/UL (ref 0–0.04)
IMM GRANULOCYTES # BLD AUTO: 0.03 K/UL (ref 0–0.04)
IMM GRANULOCYTES # BLD AUTO: 0.03 K/UL (ref 0–0.04)
IMM GRANULOCYTES NFR BLD AUTO: 0.2 % (ref 0–0.5)
IMM GRANULOCYTES NFR BLD AUTO: 0.3 % (ref 0–0.5)
IMM GRANULOCYTES NFR BLD AUTO: 0.4 % (ref 0–0.5)
INR PPP: 2 (ref 0.8–1.2)
LDH SERPL L TO P-CCNC: 269 U/L (ref 110–260)
LYMPHOCYTES # BLD AUTO: 1 K/UL (ref 1–4.8)
LYMPHOCYTES # BLD AUTO: 1.2 K/UL (ref 1–4.8)
LYMPHOCYTES # BLD AUTO: 1.4 K/UL (ref 1–4.8)
LYMPHOCYTES NFR BLD: 12.7 % (ref 18–48)
LYMPHOCYTES NFR BLD: 12.9 % (ref 18–48)
LYMPHOCYTES NFR BLD: 16.6 % (ref 18–48)
MAGNESIUM SERPL-MCNC: 1.7 MG/DL (ref 1.6–2.6)
MCH RBC QN AUTO: 19 PG (ref 27–31)
MCH RBC QN AUTO: 19.5 PG (ref 27–31)
MCH RBC QN AUTO: 19.8 PG (ref 27–31)
MCHC RBC AUTO-ENTMCNC: 27.8 G/DL (ref 32–36)
MCHC RBC AUTO-ENTMCNC: 28.2 G/DL (ref 32–36)
MCHC RBC AUTO-ENTMCNC: 28.9 G/DL (ref 32–36)
MCV RBC AUTO: 68 FL (ref 82–98)
MCV RBC AUTO: 69 FL (ref 82–98)
MCV RBC AUTO: 69 FL (ref 82–98)
MODE: ABNORMAL
MONOCYTES # BLD AUTO: 0.6 K/UL (ref 0.3–1)
MONOCYTES # BLD AUTO: 0.7 K/UL (ref 0.3–1)
MONOCYTES # BLD AUTO: 0.7 K/UL (ref 0.3–1)
MONOCYTES NFR BLD: 7.5 % (ref 4–15)
MONOCYTES NFR BLD: 7.8 % (ref 4–15)
MONOCYTES NFR BLD: 8.1 % (ref 4–15)
NEUTROPHILS # BLD AUTO: 6.3 K/UL (ref 1.8–7.7)
NEUTROPHILS # BLD AUTO: 6.4 K/UL (ref 1.8–7.7)
NEUTROPHILS # BLD AUTO: 7.1 K/UL (ref 1.8–7.7)
NEUTROPHILS NFR BLD: 73.6 % (ref 38–73)
NEUTROPHILS NFR BLD: 77.7 % (ref 38–73)
NEUTROPHILS NFR BLD: 78 % (ref 38–73)
NRBC BLD-RTO: 0 /100 WBC
PHOSPHATE SERPL-MCNC: 2.8 MG/DL (ref 2.7–4.5)
PLATELET # BLD AUTO: 232 K/UL (ref 150–450)
PLATELET # BLD AUTO: 235 K/UL (ref 150–450)
PLATELET # BLD AUTO: 253 K/UL (ref 150–450)
PMV BLD AUTO: 10.1 FL (ref 9.2–12.9)
PMV BLD AUTO: 9.9 FL (ref 9.2–12.9)
PMV BLD AUTO: ABNORMAL FL (ref 9.2–12.9)
PO2 BLDA: 26 MMHG (ref 40–60)
POC SATURATED O2: 47 % (ref 95–100)
POCT GLUCOSE: 260 MG/DL (ref 70–110)
POCT GLUCOSE: 297 MG/DL (ref 70–110)
POCT GLUCOSE: 324 MG/DL (ref 70–110)
POCT GLUCOSE: 384 MG/DL (ref 70–110)
POTASSIUM SERPL-SCNC: 3.1 MMOL/L (ref 3.5–5.1)
PROTHROMBIN TIME: 21.1 SEC (ref 9–12.5)
RBC # BLD AUTO: 3.33 M/UL (ref 4.6–6.2)
RBC # BLD AUTO: 3.48 M/UL (ref 4.6–6.2)
RBC # BLD AUTO: 3.69 M/UL (ref 4.6–6.2)
SAMPLE: ABNORMAL
SITE: ABNORMAL
SODIUM SERPL-SCNC: 132 MMOL/L (ref 136–145)
SPECIMEN OUTDATE: NORMAL
TRANS ERYTHROCYTES VOL PATIENT: NORMAL ML
WBC # BLD AUTO: 8.13 K/UL (ref 3.9–12.7)
WBC # BLD AUTO: 8.56 K/UL (ref 3.9–12.7)
WBC # BLD AUTO: 9.18 K/UL (ref 3.9–12.7)

## 2024-09-24 PROCEDURE — 99223 1ST HOSP IP/OBS HIGH 75: CPT | Mod: ,,, | Performed by: STUDENT IN AN ORGANIZED HEALTH CARE EDUCATION/TRAINING PROGRAM

## 2024-09-24 PROCEDURE — 63600175 PHARM REV CODE 636 W HCPCS: Performed by: PHYSICIAN ASSISTANT

## 2024-09-24 PROCEDURE — 82550 ASSAY OF CK (CPK): CPT | Performed by: PHYSICIAN ASSISTANT

## 2024-09-24 PROCEDURE — 25000003 PHARM REV CODE 250: Performed by: INTERNAL MEDICINE

## 2024-09-24 PROCEDURE — 25000003 PHARM REV CODE 250: Performed by: PHYSICIAN ASSISTANT

## 2024-09-24 PROCEDURE — 27201040 HC RC 50 FILTER: Mod: 91 | Performed by: INTERNAL MEDICINE

## 2024-09-24 PROCEDURE — 30233N0 TRANSFUSION OF AUTOLOGOUS RED BLOOD CELLS INTO PERIPHERAL VEIN, PERCUTANEOUS APPROACH: ICD-10-PCS | Performed by: INTERNAL MEDICINE

## 2024-09-24 PROCEDURE — 63600175 PHARM REV CODE 636 W HCPCS: Performed by: INTERNAL MEDICINE

## 2024-09-24 PROCEDURE — 85610 PROTHROMBIN TIME: CPT | Performed by: PHYSICIAN ASSISTANT

## 2024-09-24 PROCEDURE — 63600175 PHARM REV CODE 636 W HCPCS: Performed by: STUDENT IN AN ORGANIZED HEALTH CARE EDUCATION/TRAINING PROGRAM

## 2024-09-24 PROCEDURE — 99497 ADVNCD CARE PLAN 30 MIN: CPT | Mod: 25,,,

## 2024-09-24 PROCEDURE — 20600001 HC STEP DOWN PRIVATE ROOM

## 2024-09-24 PROCEDURE — 86900 BLOOD TYPING SEROLOGIC ABO: CPT | Performed by: INTERNAL MEDICINE

## 2024-09-24 PROCEDURE — 36430 TRANSFUSION BLD/BLD COMPNT: CPT

## 2024-09-24 PROCEDURE — 83615 LACTATE (LD) (LDH) ENZYME: CPT | Performed by: PHYSICIAN ASSISTANT

## 2024-09-24 PROCEDURE — 93750 INTERROGATION VAD IN PERSON: CPT | Mod: 76,,, | Performed by: INTERNAL MEDICINE

## 2024-09-24 PROCEDURE — 85025 COMPLETE CBC W/AUTO DIFF WBC: CPT | Mod: 91 | Performed by: PHYSICIAN ASSISTANT

## 2024-09-24 PROCEDURE — 84100 ASSAY OF PHOSPHORUS: CPT | Performed by: PHYSICIAN ASSISTANT

## 2024-09-24 PROCEDURE — P9021 RED BLOOD CELLS UNIT: HCPCS | Performed by: PHYSICIAN ASSISTANT

## 2024-09-24 PROCEDURE — 27201040 HC RC 50 FILTER: Performed by: PHYSICIAN ASSISTANT

## 2024-09-24 PROCEDURE — 25000003 PHARM REV CODE 250: Performed by: STUDENT IN AN ORGANIZED HEALTH CARE EDUCATION/TRAINING PROGRAM

## 2024-09-24 PROCEDURE — 63700000 PHARM REV CODE 250 ALT 637 W/O HCPCS: Performed by: PHYSICIAN ASSISTANT

## 2024-09-24 PROCEDURE — 99223 1ST HOSP IP/OBS HIGH 75: CPT | Mod: ,,,

## 2024-09-24 PROCEDURE — 86850 RBC ANTIBODY SCREEN: CPT | Performed by: INTERNAL MEDICINE

## 2024-09-24 PROCEDURE — 86920 COMPATIBILITY TEST SPIN: CPT | Performed by: PHYSICIAN ASSISTANT

## 2024-09-24 PROCEDURE — 83735 ASSAY OF MAGNESIUM: CPT | Performed by: PHYSICIAN ASSISTANT

## 2024-09-24 PROCEDURE — 86644 CMV ANTIBODY: CPT | Performed by: PHYSICIAN ASSISTANT

## 2024-09-24 PROCEDURE — 85730 THROMBOPLASTIN TIME PARTIAL: CPT | Performed by: PHYSICIAN ASSISTANT

## 2024-09-24 PROCEDURE — 99223 1ST HOSP IP/OBS HIGH 75: CPT | Mod: ,,, | Performed by: INTERNAL MEDICINE

## 2024-09-24 PROCEDURE — 99233 SBSQ HOSP IP/OBS HIGH 50: CPT | Mod: 95,,, | Performed by: PHYSICIAN ASSISTANT

## 2024-09-24 PROCEDURE — 25000003 PHARM REV CODE 250

## 2024-09-24 PROCEDURE — 80048 BASIC METABOLIC PNL TOTAL CA: CPT | Performed by: PHYSICIAN ASSISTANT

## 2024-09-24 PROCEDURE — 36415 COLL VENOUS BLD VENIPUNCTURE: CPT | Performed by: PHYSICIAN ASSISTANT

## 2024-09-24 PROCEDURE — 27000248 HC VAD-ADDITIONAL DAY

## 2024-09-24 RX ORDER — POLYETHYLENE GLYCOL 3350, SODIUM SULFATE ANHYDROUS, SODIUM BICARBONATE, SODIUM CHLORIDE, POTASSIUM CHLORIDE 236; 22.74; 6.74; 5.86; 2.97 G/4L; G/4L; G/4L; G/4L; G/4L
4000 POWDER, FOR SOLUTION ORAL ONCE
Status: COMPLETED | OUTPATIENT
Start: 2024-09-24 | End: 2024-09-24

## 2024-09-24 RX ORDER — INSULIN GLARGINE 100 [IU]/ML
10 INJECTION, SOLUTION SUBCUTANEOUS NIGHTLY
Status: COMPLETED | OUTPATIENT
Start: 2024-09-24 | End: 2024-09-24

## 2024-09-24 RX ORDER — INSULIN GLARGINE 100 [IU]/ML
15 INJECTION, SOLUTION SUBCUTANEOUS 2 TIMES DAILY
Status: DISCONTINUED | OUTPATIENT
Start: 2024-09-25 | End: 2024-09-27

## 2024-09-24 RX ORDER — POTASSIUM CHLORIDE 20 MEQ/1
40 TABLET, EXTENDED RELEASE ORAL ONCE
Status: COMPLETED | OUTPATIENT
Start: 2024-09-24 | End: 2024-09-24

## 2024-09-24 RX ORDER — INSULIN ASPART 100 [IU]/ML
10 INJECTION, SOLUTION INTRAVENOUS; SUBCUTANEOUS
Status: DISCONTINUED | OUTPATIENT
Start: 2024-09-24 | End: 2024-09-24

## 2024-09-24 RX ORDER — MAGNESIUM SULFATE HEPTAHYDRATE 40 MG/ML
2 INJECTION, SOLUTION INTRAVENOUS ONCE
Status: COMPLETED | OUTPATIENT
Start: 2024-09-24 | End: 2024-09-24

## 2024-09-24 RX ORDER — HYDROCODONE BITARTRATE AND ACETAMINOPHEN 500; 5 MG/1; MG/1
TABLET ORAL
Status: DISCONTINUED | OUTPATIENT
Start: 2024-09-24 | End: 2024-09-29 | Stop reason: HOSPADM

## 2024-09-24 RX ADMIN — ATORVASTATIN CALCIUM 40 MG: 20 TABLET, FILM COATED ORAL at 09:09

## 2024-09-24 RX ADMIN — ASPIRIN 81 MG: 81 TABLET, COATED ORAL at 09:09

## 2024-09-24 RX ADMIN — MAGNESIUM SULFATE HEPTAHYDRATE 2 G: 40 INJECTION, SOLUTION INTRAVENOUS at 05:09

## 2024-09-24 RX ADMIN — FLUCONAZOLE 400 MG: 100 TABLET ORAL at 09:09

## 2024-09-24 RX ADMIN — HYDROCORTISONE ACETATE 25 MG: 25 SUPPOSITORY RECTAL at 10:09

## 2024-09-24 RX ADMIN — AMIODARONE HYDROCHLORIDE 200 MG: 200 TABLET ORAL at 09:09

## 2024-09-24 RX ADMIN — POTASSIUM CHLORIDE 30 MEQ: 750 CAPSULE, EXTENDED RELEASE ORAL at 09:09

## 2024-09-24 RX ADMIN — FUROSEMIDE 80 MG: 10 INJECTION, SOLUTION INTRAVENOUS at 03:09

## 2024-09-24 RX ADMIN — EPLERENONE 25 MG: 25 TABLET, FILM COATED ORAL at 09:09

## 2024-09-24 RX ADMIN — INSULIN ASPART 6 UNITS: 100 INJECTION, SOLUTION INTRAVENOUS; SUBCUTANEOUS at 07:09

## 2024-09-24 RX ADMIN — DULOXETINE HYDROCHLORIDE 30 MG: 30 CAPSULE, DELAYED RELEASE ORAL at 09:09

## 2024-09-24 RX ADMIN — POLYETHYLENE GLYCOL 3350, SODIUM SULFATE ANHYDROUS, SODIUM BICARBONATE, SODIUM CHLORIDE, POTASSIUM CHLORIDE 4000 ML: 236; 22.74; 6.74; 5.86; 2.97 POWDER, FOR SOLUTION ORAL at 06:09

## 2024-09-24 RX ADMIN — POTASSIUM CHLORIDE 40 MEQ: 1500 TABLET, EXTENDED RELEASE ORAL at 05:09

## 2024-09-24 RX ADMIN — INSULIN ASPART 7 UNITS: 100 INJECTION, SOLUTION INTRAVENOUS; SUBCUTANEOUS at 07:09

## 2024-09-24 RX ADMIN — LEVETIRACETAM 1500 MG: 500 TABLET, FILM COATED ORAL at 08:09

## 2024-09-24 RX ADMIN — INSULIN ASPART 6 UNITS: 100 INJECTION, SOLUTION INTRAVENOUS; SUBCUTANEOUS at 01:09

## 2024-09-24 RX ADMIN — GABAPENTIN 400 MG: 400 CAPSULE ORAL at 08:09

## 2024-09-24 RX ADMIN — GABAPENTIN 100 MG: 100 CAPSULE ORAL at 09:09

## 2024-09-24 RX ADMIN — POTASSIUM CHLORIDE 40 MEQ: 1500 TABLET, EXTENDED RELEASE ORAL at 03:09

## 2024-09-24 RX ADMIN — CYCLOBENZAPRINE HYDROCHLORIDE 5 MG: 5 TABLET, FILM COATED ORAL at 08:09

## 2024-09-24 RX ADMIN — INSULIN ASPART 5 UNITS: 100 INJECTION, SOLUTION INTRAVENOUS; SUBCUTANEOUS at 08:09

## 2024-09-24 RX ADMIN — PANTOPRAZOLE SODIUM 40 MG: 40 TABLET, DELAYED RELEASE ORAL at 09:09

## 2024-09-24 RX ADMIN — LEVETIRACETAM 1500 MG: 500 TABLET, FILM COATED ORAL at 09:09

## 2024-09-24 RX ADMIN — HYDROCORTISONE ACETATE 25 MG: 25 SUPPOSITORY RECTAL at 08:09

## 2024-09-24 RX ADMIN — FUROSEMIDE 80 MG: 10 INJECTION, SOLUTION INTRAVENOUS at 08:09

## 2024-09-24 RX ADMIN — INSULIN GLARGINE 10 UNITS: 100 INJECTION, SOLUTION SUBCUTANEOUS at 08:09

## 2024-09-24 RX ADMIN — INSULIN GLARGINE 21 UNITS: 100 INJECTION, SOLUTION SUBCUTANEOUS at 09:09

## 2024-09-24 RX ADMIN — CEFAZOLIN 6 G: 10 INJECTION, POWDER, FOR SOLUTION INTRAVENOUS at 03:09

## 2024-09-24 RX ADMIN — ACETAMINOPHEN 650 MG: 325 TABLET ORAL at 08:09

## 2024-09-24 RX ADMIN — FUROSEMIDE 80 MG: 10 INJECTION, SOLUTION INTRAVENOUS at 09:09

## 2024-09-24 RX ADMIN — INSULIN ASPART 8 UNITS: 100 INJECTION, SOLUTION INTRAVENOUS; SUBCUTANEOUS at 04:09

## 2024-09-24 RX ADMIN — Medication 400 MG: at 09:09

## 2024-09-24 RX ADMIN — AMLODIPINE BESYLATE 5 MG: 5 TABLET ORAL at 09:09

## 2024-09-24 NOTE — CONSULTS
"  [facility] - Cardiology Stepdown  Adult Nutrition  Consult Note    SUMMARY   Recommendations    Continue Cardiac diet w/ fluid per MD  RD added diabetic diet restrictions  RD following    Goals: Meet % een/epn by next RD f/u  Nutrition Goal Status: other (comment) (poc)  Communication of RD Recs: other (comment) (poc)    Assessment and Plan    No nutrition diagnosis at this time    Reason for Assessment    Reason For Assessment: consult  Diagnosis: other (see comments) (no pincipal problem)  Relevant Medical History: CHF, polysubstance abuse, DM  Interdisciplinary Rounds: did not attend  General Information Comments: RD consulted for IDDM/ nutrition assessment. Pt last educated by [facility] RD on 9/9. Education added to discharge planning.Pt expected d/c today. Limited intake recorded per RN flowsheets. Noted with wt flux this year, likely d/t fluid status.  Nutrition Discharge Planning: carb controlled diet    Nutrition Related Social Determinants of Health: SDOH: Unable to assess at this time.      Nutrition Risk Screen    Nutrition Risk Screen: no indicators present    Nutrition/Diet History    Spiritual, Cultural Beliefs, Latter-day Practices, Values that Affect Care: no  Food Allergies: NKFA    Anthropometrics    Temp: 98 °F (36.7 °C)  Height Method: Stated  Height: 6' 3" (190.5 cm)  Height (inches): 75 in  Weight Method: Bed Scale  Weight: 74.5 kg (164 lb 3.9 oz)  Weight (lb): 164.24 lb  Ideal Body Weight (IBW), Male: 196 lb  BMI (Calculated): 20.5  BMI Grade: 18.5-24.9 - normal       Lab/Procedures/Meds    Pertinent Labs Reviewed: reviewed  Pertinent Labs Comments: H/H: 7/25.2, mcv: 68, mch: 19, mchc: 27.8, na: 132, potassium: 3.1, Glu: 256  Pertinent Medications Reviewed: reviewed  Pertinent Medications Comments: Atorvastatin, abx, furosemide, gabapentin, insulin, mag oxide, potassium chloride      Estimated/Assessed Needs    Weight Used For Calorie Calculations: 74.5 kg (164 lb 3.9 oz)  Energy Calorie " Requirements (kcal): 2094 (msj * 1.2 PAL)  Energy Need Method: [place]-St Jeor  Protein Requirements: 74-89 (1-1.2 g/kg)  Weight Used For Protein Calculations: 74.5 kg (164 lb 3.9 oz)     Estimated Fluid Requirement Method: other (see comments) (per MD)  RDA Method (mL): 2094  CHO Requirement: 261      Nutrition Prescription Ordered    Current Diet Order: Cardiac  Nutrition Order Comments: 1500 mL fluid    Evaluation of Received Nutrient/Fluid Intake    I/O: -2.6 L since admit  Comments: LBM 9/24  % Intake of Estimated Energy Needs: 0 - 25 %  % Meal Intake: 0 - 25 %    Nutrition Risk    Level of Risk/Frequency of Follow-up: low - moderate (f/u 1-2x/week)       Monitor and Evaluation    Food and Nutrient Intake: energy intake, food and beverage intake  Food and Nutrient Adminstration: diet order  Physical Activity and Function: nutrition-related ADLs and IADLs  Anthropometric Measurements: weight, height/length, body mass index, weight change  Biochemical Data, Medical Tests and Procedures: electrolyte and renal panel, gastrointestinal profile, glucose/endocrine profile, inflammatory profile, lipid profile       Nutrition Follow-Up    RD Follow-up?: Yes    Sneha Hong RD, LDN

## 2024-09-24 NOTE — HPI
"As per H&P, "Kevan Queen is a 39 y.o. 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  He was weaned off  but he had to restarted due to RVF and transitioned to milrinone (secondary to  shortage). Most recently admitted on 9/10-9/12 for pyelonephritis with Bcx positive for yeast. Had many GOC discussions but he did not want to be DNR/DNI, and wants to continue aggressive care. He was weaned off milrinone but was discharged with PICC for IV antibiotics. Transferred after presenting to the ED last night after one episode of bright red blood per rectum yesterday evening. Pt states he has had rectal pain for a few days. His mother found an external hemorrhoid and was able to reduce with manual pressure in the rectum. H/H stable. INR 2.1."    Patient is seen in Palliative Medicine clinic by Dr. Carr. While admitted to the hospital, primary team, Dr. Crum, consulted for inpatient Palliative Medicine to discuss goals of care and advanced care planning.   "

## 2024-09-24 NOTE — ANESTHESIA PREPROCEDURE EVALUATION
[facility]  Anesthesia Pre-Operative Evaluation         Patient Name: Kevan Queen  YOB: 1985  MRN: [id number]    SUBJECTIVE:     Pre-operative evaluation for Procedure(s) (LRB):  COLONOSCOPY (N/A)  EGD (ESOPHAGOGASTRODUODENOSCOPY) (N/A)     09/24/2024    Kevan Queen is a 39 y.o. male with CHF s/p LVAD on home warfarin, polysubstance use disorder, T2DM, HTN, seizure disorder, tobacco abuse.     He is admitted to the floor with a GI bleed.     Patient now presents for the above procedure(s).    Echo Summary  Results for orders placed during the hospital encounter of 08/25/24    Echo    Interpretation Summary    LVAD: There is a Heartmate III LVAD running at 5,100 RPM. The aortic valve does not open. The ventricular septum is at midline. Inflow cannula well seated at the apex.    Left Ventricle: The left ventricle is severely dilated. Normal wall thickness. Severe global hypokinesis present. There is severely reduced systolic function with a visually estimated ejection fraction of 5 - 10%. Unable to assess diastolic function due to LVAD.    Right Ventricle: Severe right ventricular enlargement. Right ventricle wall motion has global hypokinesis. Systolic function is moderately reduced.    Biatrial enlargement    Mitral Valve: The mitral valve is repaired with an Elyse stitch. There is mild to moderate regurgitation.    Tricuspid Valve: There is severe regurgitation.    Pulmonary Artery: The estimated pulmonary artery systolic pressure is 33 mmHg.    IVC/SVC: Elevated venous pressure at 15 mmHg.       Prev airway:   Intubation     Date/Time: 11/9/2023 12:43 PM     Performed by: Letty Spnecer CRNA  Authorized by: Kadeem Alex MD    Intubation:     Induction:  Intravenous    Intubated:  Postinduction    Mask Ventilation:  Easy with oral airway    Attempts:  1    Attempted By:  Student    Method of Intubation:  Video laryngoscopy    Blade:  [place]ster 3     Laryngeal View Grade: Grade I - full view of cords      Difficult Airway Encountered?: No      Complications:  None    Airway Device:  Oral endotracheal tube    Airway Device Size:  7.5    Style/Cuff Inflation:  Cuffed    Inflation Amount (mL):  8    Tube secured:  21    Secured at:  The teeth    Placement Verified By:  Capnometry    Complicating Factors:  None    LDA:   Tunneled Central Line - Double Lumen  09/08/24 1419 Internal Jugular Right (Active)   Line Necessity Review Longterm central access required 09/09/24 1945   Verification by X-ray Other (Comment) 09/08/24 1805   Site Assessment No drainage;No redness;No swelling;No warmth 09/09/24 1945   Line Securement Device Secured with sutureless device 09/09/24 1945   Dressing Type CHG impregnated dressing/sponge 09/09/24 1945   Dressing Status Clean;Dry;Intact 09/09/24 1945   Dressing Intervention Integrity maintained 09/09/24 1945   Date on Dressing 09/08/24 09/09/24 1945   Dressing Due to be Changed 09/15/24 09/09/24 1945   Distal Patency/Care flushed w/o difficulty 09/09/24 1945   Proximal 1 Patency/Care flushed w/o difficulty 09/09/24 1945   Number of days: 15            Peripheral IV - Single Lumen 09/23/24 20 G Left Antecubital (Active)   Site Assessment Clean;Dry;Intact 09/23/24 2307   Extremity Assessment Distal to IV No abnormal discoloration 09/23/24 1932   Line Status Saline locked 09/23/24 2307   Dressing Status Clean;Dry;Intact 09/23/24 2307   Dressing Intervention Integrity maintained 09/23/24 1932   Dressing Change Due 09/27/24 09/23/24 1932   Site Change Due 09/27/24 09/23/24 1932   Reason Not Rotated Not due 09/23/24 1932   Number of days: 1            VAD 06/29/22 1115 Left ventricular assist device HeartMate 3 (Active)   Site Location Abdomen right 09/09/24 1945   Site Assessment Other (Comment) 09/09/24 1945   Driveline Exit Site 2 09/09/24 0643   Driveline Assessment Free of Kinks;Intact;Modular cable Clean and Locked 09/09/24 1945    Dressing Status Clean;Dry;Intact 09/09/24 1945   Dressing Intervention Integrity maintained 09/09/24 1945   Performed By RN 09/09/24 0643   Dressing Change Schedule Daily 09/09/24 1945   Dressing Change Due 09/10/24 09/09/24 1945   Integrity dry;intact;no damage 09/09/24 1945   Driveline [place] in use Tape 09/09/24 1945   Condition CDI 09/09/24 1945   Date changed 09/09/24 09/09/24 0800   Post Removal Complications None 09/07/24 1925   Number of days: 818       Drips: None documented.      Patient Active Problem List   Diagnosis    Acute on chronic systolic CHF (congestive heart failure)    Anxiety disorder, unspecified    Anemia of chronic disease    Ecstasy abuse    Cannabis use disorder, severe, dependence    CHF (NYHA class IV, ACC/AHA stage D)    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    Chronic combined systolic and diastolic heart failure    Type 2 diabetes mellitus with hyperglycemia    Insomnia    Tachycardia    Palliative care encounter    Familial dilated cardiomyopathy    LVAD (left ventricular assist device) present    On mechanically assisted ventilation    Hyponatremia    Seizure-like activity    Temporal lobe seizure    Right heart failure secondary to left heart failure-post LVAD surgery    History of substance abuse    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)    Pleural effusion    Noncompliance with medication regimen    Moderate malnutrition    Receiving inotropic medication    Chronic anticoagulation    Type 2 diabetes mellitus with hyperosmolar hyperglycemic state (HHS)    HTN (hypertension)    Acute decompensated heart failure    Type 2 diabetes mellitus with hyperglycemia, with long-term current use of insulin    Muscle cramping    Goals of care, counseling/discussion    Advance care planning    Hypokalemia    Bilateral back pain    Supratherapeutic INR    Subcutaneous nodule of breast    Polysubstance abuse    Atrial flutter    Severe sepsis    Pain     Dyspnea    Advanced care planning/counseling discussion    Pyelonephritis    Pulmonary nodules    Fungemia    Rectal bleeding       Review of patient's allergies indicates:   Allergen Reactions    Torsemide Hives       Current Inpatient Medications:   alteplase  2 mg Intra-Catheter Once    amiodarone  200 mg Oral Daily    amLODIPine  5 mg Oral Daily    atorvastatin  40 mg Oral Daily    ceFAZolin (ANCEF) 6 g in D5W 500 mL CONTINUOUS INFUSION  6 g Intravenous Q24H    DULoxetine  30 mg Oral Daily    eplerenone  25 mg Oral Daily    fluconazole  400 mg Oral Daily    furosemide (LASIX) injection  80 mg Intravenous TID    gabapentin  100 mg Oral Daily    And    gabapentin  400 mg Oral QHS    hydrocortisone  25 mg Rectal BID    insulin glargine U-100  21 Units Subcutaneous Daily    levETIRAcetam  1,500 mg Oral BID    magnesium oxide  400 mg Oral Daily    pantoprazole  40 mg Oral Daily    polyethylene glycol  4,000 mL Oral Once    potassium chloride  30 mEq Oral Daily       No current facility-administered medications on file prior to encounter.     Current Outpatient Medications on File Prior to Encounter   Medication Sig Dispense Refill    acetaminophen (TYLENOL) 325 MG tablet Take 2 tablets (650 mg total) by mouth every 6 (six) hours as needed for Pain.  0    amiodarone (PACERONE) 200 MG Tab Take 1 tablet (200 mg total) by mouth once daily. 30 tablet 11    amLODIPine (NORVASC) 5 MG tablet Take 1 tablet (5 mg total) by mouth once daily. 90 tablet 3    aspirin (ECOTRIN) 81 MG EC tablet Take 1 tablet (81 mg total) by mouth once daily. 90 tablet 3    atorvastatin (LIPITOR) 40 MG tablet Take 1 tablet (40 mg total) by mouth once daily. 90 tablet 3    DULoxetine (CYMBALTA) 30 MG capsule Take 1 capsule (30 mg total) by mouth once daily. 30 capsule 11    eplerenone (INSPRA) 25 MG Tab Take 1 tablet (25 mg total) by mouth once daily. 30 tablet 11    fluconazole (DIFLUCAN) 200 MG Tab Take 2 tablets (400 mg total) by mouth once daily.  56 tablet 0    furosemide (LASIX) 80 MG tablet Take 1 tablet (80 mg total) by mouth once daily. 30 tablet 11    gabapentin (NEURONTIN) 100 MG capsule Take 1 capsule (100 mg total) by mouth 2 (two) times daily AND 4 capsules (400 mg total) every evening. 180 capsule 11    insulin aspart U-100 (NOVOLOG) 100 unit/mL (3 mL) InPn pen Inject 18 Units into the skin 3 (three) times daily with meals: MAX 94 units/daily  150-200    201-250    251-300    301-350    >350  +2 units   +4 units    +6 units    +8 units    +10 units      lancets 33 gauge Misc Use to test blood glucose 4 (four) times daily. 200 each 3    levETIRAcetam (KEPPRA) 1000 MG tablet Take 1.5 tablets (1,500 mg total) by mouth 2 (two) times daily. 60 tablet 11    magnesium oxide (MAG-OX) 400 mg (241.3 mg magnesium) tablet Take 1 tablet (400 mg total) by mouth once daily. 30 tablet 11    ondansetron (ZOFRAN-ODT) 4 MG TbDL Take 1 tablet (4 mg total) by mouth every 8 (eight) hours as needed (nausea). 10 tablet 0    pantoprazole (PROTONIX) 40 MG tablet Take 1 tablet (40 mg total) by mouth once daily. 30 tablet 11    potassium chloride SA (K-DUR,KLOR-CON M) 10 MEQ tablet Take 3 tablets (30 mEq total) by mouth once daily. 90 tablet 3    warfarin (COUMADIN) 3 MG tablet Take 0.5 tablets (1.5 mg total) by mouth Daily. 30 tablet 5    blood sugar diagnostic Strp Use to test blood glucose 4 (four) times daily. 200 each 5    blood-glucose meter (TRUE METRIX GLUCOSE METER) Misc Use to test blood glucose 4 (four) times daily. 1 each 0    blood-glucose sensor (FREESTYLE LUCIUS 3 SENSOR) Dee 1 Device by Misc.(Non-Drug; Combo Route) route every 14 (fourteen) days. 3 each 9    D5W PgBk 50 mL with ceFAZolin 2 gram SolR 6 g Inject 6 g into the vein once daily.      glucagon (BAQSIMI) 3 mg/actuation Spry 1 each by Nasal route as needed (hypoglycemia). 2 each 3    insulin detemir U-100, Levemir, 100 unit/mL (3 mL) SubQ InPn pen Inject 12 Units into the skin 2 (two) times daily. In  "the morning and before bed.      pen needle, diabetic 32 gauge x 5/32" Ndle 1 each by Misc.(Non-Drug; Combo Route) route 4 (four) times daily. 200 each 11    [DISCONTINUED] digoxin (LANOXIN) 125 mcg tablet Take 1 tablet (0.125 mg total) by mouth once daily. 30 tablet 11    [DISCONTINUED] EScitalopram oxalate (LEXAPRO) 5 MG Tab Take 1 tablet (5 mg total) by mouth once daily. 90 tablet 3    [DISCONTINUED] insulin (LANTUS SOLOSTAR U-100 INSULIN) glargine 100 units/mL (3mL) SubQ pen Inject 10 Units into the skin every evening. (Patient not taking: Reported on 5/24/2022) 3 mL 11    [DISCONTINUED] metOLazone (ZAROXOLYN) 2.5 MG tablet Take 2.5 mg by mouth every other day.      [DISCONTINUED] metoprolol succinate (TOPROL-XL) 25 MG 24 hr tablet TAKE 1 TABLET(25 MG) BY MOUTH EVERY DAY 30 tablet 0       Past Surgical History:   Procedure Laterality Date    APPLICATION OF WOUND VACUUM-ASSISTED CLOSURE DEVICE N/A 6/30/2022    Procedure: APPLICATION, WOUND VAC;  Surgeon: Luis F Paige MD;  Location: [facility] OR [address] [place] [address];  Service: Cardiovascular;  Laterality: N/A;  50 x 5 cm    CARDIAC DEFIBRILLATOR PLACEMENT      ECHOCARDIOGRAM,TRANSESOPHAGEAL  11/9/2023    Procedure: Transesophageal echo (GUILLERMO) intra-procedure log documentation;  Surgeon: Eron Ivy MD;  Location: [facility] EP LAB;  Service: Cardiology;;    EXTRACTION, ELECTRODE LEAD Left 11/9/2023    Procedure: EXTRACTION, ELECTRODE LEAD;  Surgeon: ADALID Leon MD;  Location: [facility] EP LAB;  Service: Cardiology;  Laterality: Left;  INFECTION, LEAD EXTRACTION, GUILLERMO, BSCI, ANES, EH,   *NO CTS BACKUP*    IMPLANTATION OF RIGHT VENTRICULAR ASSIST DEVICE (RVAD) N/A 6/29/2022    Procedure: INSERTION, RVAD;  Surgeon: Luis F Paige MD;  Location: [facility] OR [address] [place] [address];  Service: Cardiovascular;  Laterality: N/A;    INSERTION OF GRAFT TO PERICARDIUM Right 6/30/2022    Procedure: INSERTION-RIGHT VENTRICULAR ASSIST DEVICE;  Surgeon: Luis F Paige MD;  [facility];  " Service: Cardiovascular;  Laterality: Right;    IRRIGATION OF MEDIASTINUM  6/30/2022    Procedure: IRRIGATION, MEDIASTINUM;  Surgeon: Luis F Paige MD;  [address]  Service: Cardiovascular;;    LEFT VENTRICULAR ASSIST DEVICE Left 6/23/2022    Procedure: INSERTION-LEFT VENTRICULAR ASSIST DEVICE;  Surgeon: Luis F Paige MD;  [facility];  Service: Cardiovascular;  Laterality: Left;    LEFT VENTRICULAR ASSIST DEVICE N/A 6/29/2022    Procedure: INSERTION-LEFT VENTRICULAR ASSIST DEVICE;  Surgeon: Luis F Paige MD;  [facility];  Service: Cardiovascular;  Laterality: N/A;    REVISION OF SKIN POCKET FOR CARDIOVERTER-DEFIBRILLATOR  11/9/2023    Procedure: REVISION, SKIN POCKET, FOR CARDIOVERTER-DEFIBRILLATOR;  Surgeon: ADALID Leon MD;  Location: [facility] EP LAB;  Service: Cardiology;;    RIGHT HEART CATHETERIZATION Right 4/8/2022    Procedure: INSERTION, CATHETER, RIGHT HEART;  Surgeon: Luca Lopez Jr., MD;  Location: [facility] CATH LAB;  Service: Cardiology;  Laterality: Right;    RIGHT HEART CATHETERIZATION Right 4/19/2022    Procedure: INSERTION, CATHETER, RIGHT HEART;  Surgeon: Josh Pulido MD;  Location: [facility] CATH LAB;  Service: Cardiology;  Laterality: Right;    RIGHT HEART CATHETERIZATION Right 7/21/2022    Procedure: INSERTION, CATHETER, RIGHT HEART;  Surgeon: Dalia Crum MD;  Location: [facility] CATH LAB;  Service: Cardiology;  Laterality: Right;    RIGHT HEART CATHETERIZATION Right 10/31/2022    Procedure: INSERTION, CATHETER, RIGHT HEART;  Surgeon: Dalia Crum MD;  Location: [facility] CATH LAB;  Service: Cardiology;  Laterality: Right;    STERNAL WOUND CLOSURE N/A 6/30/2022    Procedure: CLOSURE, WOUND, STERNUM;  Surgeon: Luis F Paige MD;  [address];  Service: Cardiovascular;  Laterality: N/A;       Social History:  Tobacco Use: Medium Risk (5/20/2024)    Patient History     Smoking Tobacco Use: Former     Smokeless Tobacco Use: Never      Passive Exposure: Not on file      Alcohol Use: Not At Risk (3/21/2024)    AUDIT-C     Frequency of Alcohol Consumption: Never     Average Number of Drinks: Patient does not drink     Frequency of Binge Drinking: Never        OBJECTIVE:     Vital Signs Range (Last 24H):  Temp:  [36.2 °C (97.2 °F)-36.9 °C (98.5 °F)]   Pulse:  [76-97]   Resp:  [18-20]   BP: ()/(0-66)   SpO2:  [98 %-100 %]       Significant Labs:  Lab Results   Component Value Date    WBC 8.56 09/24/2024    HGB 7.0 (L) 09/24/2024    HCT 25.2 (L) 09/24/2024     09/24/2024    CHOL 118 (L) 08/31/2023    TRIG 89 08/31/2023    HDL 37 (L) 08/31/2023    ALT 12 09/23/2024    AST 40 (H) 09/23/2024     (L) 09/24/2024    K 3.1 (L) 09/24/2024    CL 95 09/24/2024    CREATININE 0.9 09/24/2024    BUN 13 09/24/2024    CO2 30 (H) 09/24/2024    TSH 2.009 08/26/2024    PSA 6.89 (H) 04/12/2021    INR 2.0 (H) 09/24/2024    HGBA1C 10.3 (H) 08/25/2024       Diagnostic Studies: No relevant studies.    EKG:   Results for orders placed or performed during the hospital encounter of 08/25/24   EKG 12-lead    Collection Time: 08/26/24  9:39 PM   Result Value Ref Range    QRS Duration 180 ms    OHS QTC Calculation 500 ms    Narrative    Test Reason : Z95.811,    Vent. Rate : 140 BPM     Atrial Rate : 000 BPM     P-R Int : 148 ms          QRS Dur : 180 ms      QT Int : 328 ms       P-R-T Axes : 000 -63 -46 degrees     QTc Int : 500 ms    Artifact consistent with LVAD   Sinus tachycardia  Left axis deviation  Nonspecific intraventricular block  Inferior infarct ,age undetermined  Abnormal ECG  When compared with ECG of 26-AUG-2024 10:56,  Wide QRS tachycardia has replaced Wide QRS rhythm  Confirmed by Krunal Gamble MD ([id number]) on 8/30/2024 1:54:35 AM    Referred By: LUISA MICHAELS           Confirmed By:Krunal Gamble MD       2D ECHO:  TTE:  Results for orders placed or performed during the hospital encounter of 08/25/24   Echo   Result Value Ref Range    RA Width  5.44 cm    Left Atrium Major Axis 6.32 cm    RA Major Axis 6.02 cm    LV Diastolic Volume 196.08 mL    LV Systolic Volume 189.17 mL    MV Peak A Carloz 0.38 m/s    MV stenosis pressure 1/2 time 22.94 ms    TR Max Carloz 2.13 m/s    MV Peak E Carloz 0.63 m/s    LVOT diameter 2.39 cm    E wave deceleration time 79.09 msec    RV- lowry basal diam 6.3 cm    TAPSE 0.86 cm    LA size 2.63 cm    Ascending aorta 2.94 cm    STJ 2.55 cm    Sinus 3.32 cm    LVIDs 7.0 (A) 2.1 - 4.0 cm    Posterior Wall 0.7 0.6 - 1.1 cm    IVS 0.66 0.6 - 1.1 cm    LVIDd 7.2 (A) 3.5 - 6.0 cm    TDI LATERAL 0.10 m/s    LA WIDTH 5.16 cm    TDI SEPTAL 0.08 m/s    LV LATERAL E/E' RATIO 6.30 m/s    LV SEPTAL E/E' RATIO 7.88 m/s    FS 3 (A) 28 - 44 %    LV mass 211.91 g    ZLVIDD 2.40     ZLVIDS 5.52     Left Ventricle Relative Wall Thickness 0.19 cm    MV valve area p 1/2 method 9.59 cm2    E/A ratio 1.66     Mean e' 0.09 m/s    LVOT area 4.5 cm2    E/E' ratio 7.00 m/s    LV Systolic Volume Index 96.0 mL/m2    LV Diastolic Volume Index 99.53 mL/m2    LV Mass Index 108 g/m2    Triscuspid Valve Regurgitation Peak Gradient 18 mmHg    BSA 1.94 m2    TV resting pulmonary artery pressure 33 mmHg    RV TB RVSP 17 mmHg    Est. RA pres 15 mmHg    LVAD  Rate 5,100 rpm    Narrative      LVAD: There is a Heartmate III LVAD running at 5,100 RPM. The aortic   valve does not open. The ventricular septum is at midline. Inflow cannula   well seated at the apex.    Left Ventricle: The left ventricle is severely dilated. Normal wall   thickness. Severe global hypokinesis present. There is severely reduced   systolic function with a visually estimated ejection fraction of 5 - 10%.   Unable to assess diastolic function due to LVAD.    Right Ventricle: Severe right ventricular enlargement. Right ventricle   wall motion has global hypokinesis. Systolic function is moderately   reduced.    Biatrial enlargement    Mitral Valve: The mitral valve is repaired with an Elyse stitch.    There is mild to moderate regurgitation.    Tricuspid Valve: There is severe regurgitation.    Pulmonary Artery: The estimated pulmonary artery systolic pressure is   33 mmHg.    IVC/SVC: Elevated venous pressure at 15 mmHg.         GUILLERMO:  Results for orders placed or performed during the hospital encounter of 11/08/23   Transesophageal echo (GUILLERMO)   Result Value Ref Range    BSA 2.16 m2    Narrative      GUILLERMO performed prior and after lead extraction.    Left Ventricle: The left ventricle is dilated. Septal thickening.   Severe global hypokinesis present. There is severely reduced systolic   function with a visually estimated ejection fraction of 5 - 10%.    Right Ventricle: Right ventricular enlargement. Systolic function is   reduced. RV lead not attached to tricuspid valve or septum prior to   extraction.    Biatrial enlargement    Aortic Valve: The aortic valve is a trileaflet valve. There is mild   annular calcification present.    Mitral Valve: There is no stenosis. There is mild regurgitation.   Elyse stitch present.    Tricuspid Valve: There is moderate to severe regurgitation.    LVAD: The aortic valve does not open. Outflow graft is suboptimally   imaged.         ASSESSMENT/PLAN:           Pre-op Assessment    I have reviewed the Patient Summary Reports.     I have reviewed the Nursing Notes. I have reviewed the NPO Status.   I have reviewed the Medications.     Review of Systems  Anesthesia Hx:   History of prior surgery of interest to airway management or planning:          Denies Family Hx of Anesthesia complications.    Denies Personal Hx of Anesthesia complications.                    Social:  Recreational Drugs       Cardiovascular:    Pacemaker Hypertension       CHF        S/p VAD with NL RV Fxn                         Pulmonary:      Shortness of breath                  Renal/:  Chronic Renal Disease                Neurological:   CVA   Headaches Seizures                                 Endocrine:  Diabetes           Psych:  Psychiatric History                  Physical Exam  General: Well nourished, Cooperative, Alert and Oriented    Airway:  Mallampati: III / II  Mouth Opening: Normal  TM Distance: Normal  Neck ROM: Normal ROM    Dental:  Loose teeth, Periodontal disease    Chest/Lungs:  Normal Respiratory Rate    Heart:  Rate: Normal  Rhythm: Regular Rhythm        Anesthesia Plan  Type of Anesthesia, risks & benefits discussed:    Anesthesia Type: Gen ETT, Gen Supraglottic Airway, Gen Natural Airway  Intra-op Monitoring Plan: Standard ASA Monitors  Post Op Pain Control Plan: multimodal analgesia and IV/PO Opioids PRN  Induction:  IV  Airway Plan: Video and Direct, Post-Induction  Informed Consent: Informed consent signed with the Patient and all parties understand the risks and agree with anesthesia plan.  All questions answered.   ASA Score: 4  Day of Surgery Review of History & Physical: H&P Update referred to the surgeon/provider.    Ready For Surgery From Anesthesia Perspective.     .

## 2024-09-24 NOTE — CONSULTS
[facility]  Gastroenterology  Consult Note    Patient Name: Kevan Queen  MRN: [id number]  Admission Date: 9/23/2024  Hospital Length of Stay: 1 days  Code Status: Full Code   Attending Provider: Dalia Crum MD   Consulting Provider: Alberto Estes MD  Primary Care Physician: Rosio rAmendariz FNP  Principal Problem:<principal problem not specified>    Inpatient consult to Gastroenterology  Consult performed by: Alberto Estes MD  Consult ordered by: Carl Diaz MD        Subjective:     HPI: Kevan Queen is a 39-year-old male with medical history of CHF, on LVAD on home warfarin, polysubstance use disorder, diabetes mellitus who presents due to 1 noted episode of bright red blood per rectum.  Patient is currently admitted to [facility]. Patient's mother reportedly found external hemorrhoid that was able to be reduced with manual pressure.    Patient was stable on presentation. Initial labs were significant for hemoglobin 8.3 (consistent with baseline of  7-8) PT 21.1, INR 2.0 creatinine 0.9, BUN 13, . Patient received home warfarin at 5:00 p.m. on 09/23    CTA showed significant stool and concerns for proctitis and fecal impaction. No obvious source of bleeding or GI hemorrhage was found.      Gastroenterology was consulted for GI bleed.  Photograph with dark maroon substance and clots noted in media. No prior endoscopic evaluation noted on file.    Past Medical History:   Diagnosis Date    Acute respiratory failure with hypoxia 07/22/2023    Arthritis     Awareness alteration, transient 09/01/2022    Cardiomyopathy     CHF (congestive heart failure) 10/01/2020    COVID-19 06/03/2023    Diabetes mellitus     Dilated cardiomyopathy 10/26/2020    Drug abuse 10/2020    Headache 04/19/2023    Hyperglycemia 12/16/2022    Hyperosmolar hyperglycemic state (HHS) 05/25/2022    ICD (implantable cardioverter-defibrillator) in place 10/26/2020    Infected defibrillator  now s/p removal Nov 2023 07/23/2023    Left ventricular assist device (LVAD) complication, initial encounter 06/05/2023    Muscle cramping 06/15/2022    Renal disorder     SOB (shortness of breath) 06/13/2022    Tingling in extremities 07/13/2022       Past Surgical History:   Procedure Laterality Date    APPLICATION OF WOUND VACUUM-ASSISTED CLOSURE DEVICE N/A 6/30/2022    Procedure: APPLICATION, WOUND VAC;  Surgeon: Luis F Paige MD;  Location: [facility] OR [facility] FLR;  Service: Cardiovascular;  Laterality: N/A;  50 x 5 cm    CARDIAC DEFIBRILLATOR PLACEMENT      ECHOCARDIOGRAM,TRANSESOPHAGEAL  11/9/2023    Procedure: Transesophageal echo (GUILLERMO) intra-procedure log documentation;  Surgeon: Eron Ivy MD;  Location: [facility] EP LAB;  Service: Cardiology;;    EXTRACTION, ELECTRODE LEAD Left 11/9/2023    Procedure: EXTRACTION, ELECTRODE LEAD;  Surgeon: ADALID Leon MD;  Location: [facility] EP LAB;  Service: Cardiology;  Laterality: Left;  INFECTION, LEAD EXTRACTION, GUILLERMO, BSCI, ANES, EH,   *NO CTS BACKUP*    IMPLANTATION OF RIGHT VENTRICULAR ASSIST DEVICE (RVAD) N/A 6/29/2022    Procedure: INSERTION, RVAD;  Surgeon: Luis F Paige MD;  [facility];  Service: Cardiovascular;  Laterality: N/A;    INSERTION OF GRAFT TO PERICARDIUM Right 6/30/2022    Procedure: INSERTION-RIGHT VENTRICULAR ASSIST DEVICE;  Surgeon: Luis F Paige MD;  [facility];  Service: Cardiovascular;  Laterality: Right;    IRRIGATION OF MEDIASTINUM  6/30/2022    Procedure: IRRIGATION, MEDIASTINUM;  Surgeon: Luis F Paige MD;  [facility];  Service: Cardiovascular;;    LEFT VENTRICULAR ASSIST DEVICE Left 6/23/2022    Procedure: INSERTION-LEFT VENTRICULAR ASSIST DEVICE;  Surgeon: Luis F Paige MD;  [facility];  Service: Cardiovascular;  Laterality: Left;    LEFT VENTRICULAR ASSIST DEVICE N/A 6/29/2022    Procedure: INSERTION-LEFT VENTRICULAR ASSIST DEVICE;  Surgeon: Luis F Paige MD;   Location: [facility] OR [facility] FLR;  Service: Cardiovascular;  Laterality: N/A;    REVISION OF SKIN POCKET FOR CARDIOVERTER-DEFIBRILLATOR  11/9/2023    Procedure: REVISION, SKIN POCKET, FOR CARDIOVERTER-DEFIBRILLATOR;  Surgeon: ADALID Leno MD;  Location: [facility] EP LAB;  Service: Cardiology;;    RIGHT HEART CATHETERIZATION Right 4/8/2022    Procedure: INSERTION, CATHETER, RIGHT HEART;  Surgeon: Luca Lopez Jr., MD;  Location: [facility] CATH LAB;  Service: Cardiology;  Laterality: Right;    RIGHT HEART CATHETERIZATION Right 4/19/2022    Procedure: INSERTION, CATHETER, RIGHT HEART;  Surgeon: Josh Pulido MD;  Location: [facility] CATH LAB;  Service: Cardiology;  Laterality: Right;    RIGHT HEART CATHETERIZATION Right 7/21/2022    Procedure: INSERTION, CATHETER, RIGHT HEART;  Surgeon: Dalia Crum MD;  Location: [facility] CATH LAB;  Service: Cardiology;  Laterality: Right;    RIGHT HEART CATHETERIZATION Right 10/31/2022    Procedure: INSERTION, CATHETER, RIGHT HEART;  Surgeon: Dalia Crum MD;  Location: [facility] CATH LAB;  Service: Cardiology;  Laterality: Right;    STERNAL WOUND CLOSURE N/A 6/30/2022    Procedure: CLOSURE, WOUND, STERNUM;  Surgeon: Luis F Paige MD;  [address]  Service: Cardiovascular;  Laterality: N/A;       Family History   Problem Relation Name Age of Onset    Heart failure Father      Diverticulosis Brother      Heart attack Maternal Grandmother      Heart attack Maternal Grandfather         Social History     Socioeconomic History    Marital status: Significant Other   Tobacco Use    Smoking status: Former     Current packs/day: 0.00     Average packs/day: 0.5 packs/day for 16.6 years (8.3 ttl pk-yrs)     Types: Cigarettes     Start date: 10/1/2004     Quit date: 4/23/2021     Years since quitting: 3.4    Smokeless tobacco: Never   Substance and Sexual Activity    Alcohol use: Not Currently    Drug use: Not Currently     Types: Marijuana, MDMA (Ecstacy)    Sexual activity: Yes      Partners: Female     Birth control/protection: None     Social Determinants of Health     Financial Resource Strain: Low Risk  (8/26/2024)    Overall Financial Resource Strain (CARDIA)     Difficulty of Paying Living Expenses: Not hard at all   Food Insecurity: No Food Insecurity (8/26/2024)    Hunger Vital Sign     Worried About Running Out of Food in the Last Year: Never true     Ran Out of Food in the Last Year: Never true   Transportation Needs: No Transportation Needs (8/26/2024)    TRANSPORTATION NEEDS     Transportation : No   Physical Activity: Unknown (3/21/2024)    Exercise Vital Sign     Days of Exercise per Week: Patient declined     Minutes of Exercise per Session: 0 min   Stress: No Stress Concern Present (8/26/2024)    [employer] - Occupational Stress Questionnaire     Feeling of Stress : Not at all   Housing Stability: Low Risk  (8/26/2024)    Housing Stability Vital Sign     Unable to Pay for Housing in the Last Year: No     Homeless in the Last Year: No       Current Facility-Administered Medications on File Prior to Encounter   Medication Dose Route Frequency Provider Last Rate Last Admin    [DISCONTINUED] dextrose 10% bolus 125 mL 125 mL  12.5 g Intravenous Tara Moody MD        [DISCONTINUED] dextrose 10% bolus 250 mL 250 mL  25 g Intravenous Tara Moody MD        [DISCONTINUED] insulin aspart U-100 injection 0-10 Units  0-10 Units Subcutaneous Q6H Tara Moody MD   3 Units at 09/23/24 0654     Current Outpatient Medications on File Prior to Encounter   Medication Sig Dispense Refill    acetaminophen (TYLENOL) 325 MG tablet Take 2 tablets (650 mg total) by mouth every 6 (six) hours as needed for Pain.  0    amiodarone (PACERONE) 200 MG Tab Take 1 tablet (200 mg total) by mouth once daily. 30 tablet 11    amLODIPine (NORVASC) 5 MG tablet Take 1 tablet (5 mg total) by mouth once daily. 90 tablet 3    aspirin (ECOTRIN) 81 MG EC tablet  Take 1 tablet (81 mg total) by mouth once daily. 90 tablet 3    atorvastatin (LIPITOR) 40 MG tablet Take 1 tablet (40 mg total) by mouth once daily. 90 tablet 3    [] cyclobenzaprine (FLEXERIL) 5 MG tablet Take 1 tablet (5 mg total) by mouth daily as needed for Muscle spasms. To be taken prior to daily lasix dose. 30 tablet 3    DULoxetine (CYMBALTA) 30 MG capsule Take 1 capsule (30 mg total) by mouth once daily. 30 capsule 11    eplerenone (INSPRA) 25 MG Tab Take 1 tablet (25 mg total) by mouth once daily. 30 tablet 11    fluconazole (DIFLUCAN) 200 MG Tab Take 2 tablets (400 mg total) by mouth once daily. 56 tablet 0    furosemide (LASIX) 80 MG tablet Take 1 tablet (80 mg total) by mouth once daily. 30 tablet 11    gabapentin (NEURONTIN) 100 MG capsule Take 1 capsule (100 mg total) by mouth 2 (two) times daily AND 4 capsules (400 mg total) every evening. 180 capsule 11    insulin aspart U-100 (NOVOLOG) 100 unit/mL (3 mL) InPn pen Inject 18 Units into the skin 3 (three) times daily with meals: MAX 94 units/daily  150-200    201-250    251-300    301-350    >350  +2 units   +4 units    +6 units    +8 units    +10 units      lancets 33 gauge Misc Use to test blood glucose 4 (four) times daily. 200 each 3    levETIRAcetam (KEPPRA) 1000 MG tablet Take 1.5 tablets (1,500 mg total) by mouth 2 (two) times daily. 60 tablet 11    magnesium oxide (MAG-OX) 400 mg (241.3 mg magnesium) tablet Take 1 tablet (400 mg total) by mouth once daily. 30 tablet 11    ondansetron (ZOFRAN-ODT) 4 MG TbDL Take 1 tablet (4 mg total) by mouth every 8 (eight) hours as needed (nausea). 10 tablet 0    pantoprazole (PROTONIX) 40 MG tablet Take 1 tablet (40 mg total) by mouth once daily. 30 tablet 11    potassium chloride SA (K-DUR,KLOR-CON M) 10 MEQ tablet Take 3 tablets (30 mEq total) by mouth once daily. 90 tablet 3    warfarin (COUMADIN) 3 MG tablet Take 0.5 tablets (1.5 mg total) by mouth Daily. 30 tablet 5    blood sugar diagnostic Strp  "Use to test blood glucose 4 (four) times daily. 200 each 5    blood-glucose meter (TRUE METRIX GLUCOSE METER) Misc Use to test blood glucose 4 (four) times daily. 1 each 0    blood-glucose sensor (FREESTYLE LUCIUS 3 SENSOR) Dee 1 Device by Misc.(Non-Drug; Combo Route) route every 14 (fourteen) days. 3 each 9    D5W PgBk 50 mL with ceFAZolin 2 gram SolR 6 g Inject 6 g into the vein once daily.      glucagon (BAQSIMI) 3 mg/actuation Spry 1 each by Nasal route as needed (hypoglycemia). 2 each 3    insulin detemir U-100, Levemir, 100 unit/mL (3 mL) SubQ InPn pen Inject 12 Units into the skin 2 (two) times daily. In the morning and before bed.      pen needle, diabetic 32 gauge x 5/32" Ndle 1 each by Misc.(Non-Drug; Combo Route) route 4 (four) times daily. 200 each 11    [DISCONTINUED] digoxin (LANOXIN) 125 mcg tablet Take 1 tablet (0.125 mg total) by mouth once daily. 30 tablet 11    [DISCONTINUED] EScitalopram oxalate (LEXAPRO) 5 MG Tab Take 1 tablet (5 mg total) by mouth once daily. 90 tablet 3    [DISCONTINUED] insulin (LANTUS SOLOSTAR U-100 INSULIN) glargine 100 units/mL (3mL) SubQ pen Inject 10 Units into the skin every evening. (Patient not taking: Reported on 5/24/2022) 3 mL 11    [DISCONTINUED] metOLazone (ZAROXOLYN) 2.5 MG tablet Take 2.5 mg by mouth every other day.      [DISCONTINUED] metoprolol succinate (TOPROL-XL) 25 MG 24 hr tablet TAKE 1 TABLET(25 MG) BY MOUTH EVERY DAY 30 tablet 0       Review of patient's allergies indicates:   Allergen Reactions    Torsemide Hives       Review of Systems   Constitutional:  Negative for fever.   HENT:  Negative for sore throat.    Eyes:  Negative for pain.   Respiratory:  Negative for shortness of breath.    Cardiovascular:  Negative for chest pain.   Gastrointestinal:  Positive for blood in stool. Negative for abdominal pain.   Genitourinary:  Negative for dysuria.   Musculoskeletal:  Negative for myalgias.   Skin:  Negative for rash.   Neurological:  Negative for " headaches.   Psychiatric/Behavioral:  The patient is not nervous/anxious.         Objective:     Vitals:    09/24/24 0744   BP: (!) 76/0   Pulse: 79   Resp:    Temp: 98 °F (36.7 °C)       Constitutional: NAD  HENT: Normocephalic, atraumatic, no obvious deformities   Eyes: sclera non-icteric  Cardiovascular: normal rate  Respiratory: normal respiratory effort  Abdominal: NTND  Skin: warm, dry  Neurological: Alert and oriented x 3  Rectal: deferred but see media photos for bloody stool       Significant Labs:  Recent Labs   Lab 09/23/24  0205 09/23/24  2316 09/24/24  0313   HGB 8.3* 7.6* 7.0*       Lab Results   Component Value Date    WBC 8.56 09/24/2024    HGB 7.0 (L) 09/24/2024    HCT 25.2 (L) 09/24/2024    MCV 68 (L) 09/24/2024     09/24/2024       Lab Results   Component Value Date     (L) 09/24/2024    K 3.1 (L) 09/24/2024    CL 95 09/24/2024    CO2 30 (H) 09/24/2024    BUN 13 09/24/2024    CREATININE 0.9 09/24/2024    CALCIUM 8.6 (L) 09/24/2024    ANIONGAP 7 (L) 09/24/2024    ESTGFRAFRICA >60.0 07/27/2022    EGFRNONAA 54.2 (A) 07/27/2022       Lab Results   Component Value Date    ALT 12 09/23/2024    AST 40 (H) 09/23/2024    ALKPHOS 264 (H) 09/23/2024    BILITOT 1.3 09/23/2024       Lab Results   Component Value Date    INR 2.0 (H) 09/24/2024    INR 2.0 (H) 09/23/2024    INR 2.1 (H) 09/23/2024       Significant Imaging:  Reviewed pertinent radiology findings.       Assessment/Plan:     Kevan Queen is a 39 y.o. male with history of HF with LVAD and warfarin use presenting with GI bleeding.     Problem List:    GI bleeding    Recommendations:    - Plan for EGD + colonoscopy 09/25  - Clear liquid diet today and NPO at midnight   - Golytely prep to start at 6pm, to be completed within 4 hours if possible  - Monitor Hgb q8hrs  - Transfuse to keep Hgb >7, plts >50  - Hold anticoagulants if safe to do so per primary team (and cardiology)  - If becomes hemodynamically unstable with associated  large volume hematochezia, recommend STAT CTA and IR embolization if positive   - Consider initiation of bowel regimen on discharge given noted constipation    Thank you for involving us in the care of Kevan Queen. Please call with any additional questions, concerns or changes in the patient's clinical status. We will continue to follow.    Alberto Estes MD  Internal Medicine Resident, PGY-3  [facility]

## 2024-09-24 NOTE — SUBJECTIVE & OBJECTIVE
Interval History: Admitted yesterday with GIB. Hgb dropped to 7.0 from 7.6 yesterday. Patient had a BM overnight with maroon blood and clots. Additional BM this morning. GI consulted, plan for scope tomorrow. Will repeat CBC at noon and plan to transfuse PRN (consent in patient's chart). Will hold coumadin with active bleed.     Continuous Infusions:  Scheduled Meds:   alteplase  2 mg Intra-Catheter Once    amiodarone  200 mg Oral Daily    amLODIPine  5 mg Oral Daily    aspirin  81 mg Oral Daily    atorvastatin  40 mg Oral Daily    ceFAZolin (ANCEF) 6 g in D5W 500 mL CONTINUOUS INFUSION  6 g Intravenous Q24H    DULoxetine  30 mg Oral Daily    eplerenone  25 mg Oral Daily    fluconazole  400 mg Oral Daily    furosemide (LASIX) injection  80 mg Intravenous TID    gabapentin  100 mg Oral Daily    And    gabapentin  400 mg Oral QHS    hydrocortisone  25 mg Rectal BID    insulin glargine U-100  21 Units Subcutaneous Daily    levETIRAcetam  1,500 mg Oral BID    magnesium oxide  400 mg Oral Daily    pantoprazole  40 mg Oral Daily    polyethylene glycol  4,000 mL Oral Once    potassium chloride  30 mEq Oral Daily     PRN Meds:  Current Facility-Administered Medications:     acetaminophen, 650 mg, Oral, Q6H PRN    cyclobenzaprine, 5 mg, Oral, Daily PRN    dextrose 10%, 12.5 g, Intravenous, PRN    dextrose 10%, 25 g, Intravenous, PRN    glucagon (human recombinant), 1 mg, Intramuscular, PRN    glucose, 16 g, Oral, PRN    glucose, 24 g, Oral, PRN    insulin aspart U-100, 0-10 Units, Subcutaneous, QID (AC + HS) PRN    ondansetron, 4 mg, Oral, Q8H PRN    Review of patient's allergies indicates:   Allergen Reactions    Torsemide Hives     Objective:     Vital Signs (Most Recent):  Temp: 98 °F (36.7 °C) (09/24/24 0744)  Pulse: 79 (09/24/24 0744)  Resp: 18 (09/24/24 0455)  BP: (!) 76/0 (09/24/24 0744)  SpO2: 100 % (09/24/24 0455) Vital Signs (24h Range):  Temp:  [97.2 °F (36.2 °C)-98.5 °F (36.9 °C)] 98 °F (36.7 °C)  Pulse:  [76-97]  "79  Resp:  [18-19] 18  SpO2:  [98 %-100 %] 100 %  BP: ()/(0-66) 76/0     Patient Vitals for the past 72 hrs (Last 3 readings):   Weight   09/23/24 1331 74.5 kg (164 lb 3.9 oz)     Body mass index is 20.53 kg/m².      Intake/Output Summary (Last 24 hours) at 9/24/2024 1102  Last data filed at 9/24/2024 0305  Gross per 24 hour   Intake 240 ml   Output 2902 ml   Net -2662 ml       Hemodynamic Parameters:              Physical Exam  Vitals reviewed.   HENT:      Head: Normocephalic.      Nose: Nose normal.   Eyes:      Conjunctiva/sclera: Conjunctivae normal.   Cardiovascular:      Rate and Rhythm: Normal rate and regular rhythm.      Comments: Smooth VAD hum   Pulmonary:      Effort: Pulmonary effort is normal.   Abdominal:      General: Abdomen is flat.      Tenderness: There is no abdominal tenderness.   Musculoskeletal:         General: Normal range of motion.      Cervical back: Normal range of motion.   Skin:     General: Skin is warm.      Capillary Refill: Capillary refill takes less than 2 seconds.   Neurological:      General: No focal deficit present.      Mental Status: He is alert.   Psychiatric:         Mood and Affect: Mood normal.         Behavior: Behavior normal.         Thought Content: Thought content normal.         Judgment: Judgment normal.            Significant Labs:  CBC:  Recent Labs   Lab 09/23/24  0205 09/23/24  2316 09/24/24  0313   WBC 9.58 9.35 8.56   RBC 4.25* 3.79* 3.69*   HGB 8.3* 7.6* 7.0*   HCT 29.0* 25.5* 25.2*    251 232   MCV 68.2* 67* 68*   MCH 19.5* 20.1* 19.0*   MCHC 28.6* 29.8* 27.8*     BNP:  No results for input(s): "BNP" in the last 168 hours.    Invalid input(s): "BNPTRIAGELBLO"  CMP:  Recent Labs   Lab 09/17/24  1256 09/23/24  0205 09/24/24  0312   GLU  --   --  256*   CALCIUM 8.4 8.5 8.6*   ALBUMIN 2.3* 2.5*  --    * 132* 132*   K 3.4* 3.3* 3.1*   CO2 26 28 30*   CL 95* 93* 95   BUN 12.9 13.0 13   CREATININE 1.03 1.33* 0.9   ALKPHOS 219* 264*  --  "   ALT <5 12  --    AST 30 40*  --    BILITOT 1.3 1.3  --       Coagulation:   Recent Labs   Lab 09/23/24  0205 09/23/24  2316 09/24/24  0313   INR 2.1* 2.0* 2.0*   APTT 33.1  --  37.0*     LDH:  Recent Labs   Lab 09/24/24  0313   *     Microbiology:  Microbiology Results (last 7 days)       ** No results found for the last 168 hours. **            I have reviewed all pertinent labs within the past 24 hours.    Estimated Creatinine Clearance: 116.1 mL/min (based on SCr of 0.9 mg/dL).    Diagnostic Results:  I have reviewed all pertinent imaging results/findings within the past 24 hours.

## 2024-09-24 NOTE — NURSING
Copius clots and bleeding during assessment. No external hemmorhoids observed.patient states he has been bleeding like this over 5 days or more. Contacting [facility], requesting GI consult.    2300 Spoke with Dr Diaz with [facility], will review chart and notify day team.

## 2024-09-24 NOTE — PLAN OF CARE
Recommendations    Continue Cardiac diet w/ fluid per MD  RD added diabetic diet restrictions  RD following    Goals: Meet % een/epn by next RD f/u  Nutrition Goal Status: other (comment) (poc)  Communication of RD Recs: other (comment) (poc)

## 2024-09-24 NOTE — ASSESSMENT & PLAN NOTE
-HeartMate 3 Implanted 6/23/2022 with early RV failure requiring RVAD with ProTek Duo   -Hold Coumadin,  Goal INR 2.0-3.0 . Therapeutic today, but actively bleeding   -Antiplatelets ASA 81 mg  -LDH is stable overall today. Will continue to monitor daily.  -Speed set at 5100, LSL 4700 rpm  -Interrogation notable for no events  -Not listed for OHTx          Procedure: Device Interrogation Including analysis of device parameters  Current Settings: Ventricular Assist Device  Review of device function is stable/unstable stable        9/24/2024     7:50 AM 9/24/2024     1:00 AM 9/23/2024     8:11 PM 9/23/2024     4:19 PM 9/23/2024    12:49 PM 9/23/2024    10:26 AM 9/9/2024     7:53 PM   TXP LVAD INTERROGATIONS   Type HeartMate3 HeartMate3 HeartMate3 HeartMate3 HeartMate3 HeartMate3 HeartMate3   Flow 4 4 4 4 3.8 4.3 3.6   Speed 5100 5100 5100 5100 5100 5100 5100   PI 5.6 5.9 6.3 5.7 5.8 4.5 7.3   Power (Serna) 3.5 3.6 3.6 3.6 3.6 3.6 3.5   LSL 4700 4700 4700 4700 4700 4700 4700   Pulsatility No Pulse No Pulse No Pulse No Pulse No Pulse No Pulse Intermittent pulse

## 2024-09-24 NOTE — SUBJECTIVE & OBJECTIVE
Past Medical History:   Diagnosis Date    Acute respiratory failure with hypoxia 07/22/2023    Arthritis     Awareness alteration, transient 09/01/2022    Cardiomyopathy     CHF (congestive heart failure) 10/01/2020    COVID-19 06/03/2023    Diabetes mellitus     Dilated cardiomyopathy 10/26/2020    Drug abuse 10/2020    Headache 04/19/2023    Hyperglycemia 12/16/2022    Hyperosmolar hyperglycemic state (HHS) 05/25/2022    ICD (implantable cardioverter-defibrillator) in place 10/26/2020    Infected defibrillator now s/p removal Nov 2023 07/23/2023    Left ventricular assist device (LVAD) complication, initial encounter 06/05/2023    Muscle cramping 06/15/2022    Renal disorder     SOB (shortness of breath) 06/13/2022    Tingling in extremities 07/13/2022       Past Surgical History:   Procedure Laterality Date    APPLICATION OF WOUND VACUUM-ASSISTED CLOSURE DEVICE N/A 6/30/2022    Procedure: APPLICATION, WOUND VAC;  Surgeon: Luis F Paige MD;  Location: [facility] OR [address] [place] [address];  Service: Cardiovascular;  Laterality: N/A;  50 x 5 cm    CARDIAC DEFIBRILLATOR PLACEMENT      ECHOCARDIOGRAM,TRANSESOPHAGEAL  11/9/2023    Procedure: Transesophageal echo (GUILLERMO) intra-procedure log documentation;  Surgeon: Eron Ivy MD;  Location: [facility] EP LAB;  Service: Cardiology;;    EXTRACTION, ELECTRODE LEAD Left 11/9/2023    Procedure: EXTRACTION, ELECTRODE LEAD;  Surgeon: ADALID Leon MD;  Location: [facility] EP LAB;  Service: Cardiology;  Laterality: Left;  INFECTION, LEAD EXTRACTION, GUILLERMO, BSCI, ANES, EH,   *NO CTS BACKUP*    IMPLANTATION OF RIGHT VENTRICULAR ASSIST DEVICE (RVAD) N/A 6/29/2022    Procedure: INSERTION, RVAD;  Surgeon: Luis F Paige MD;  Location: [facility] OR [address] [place] [address];  Service: Cardiovascular;  Laterality: N/A;    INSERTION OF GRAFT TO PERICARDIUM Right 6/30/2022    Procedure: INSERTION-RIGHT VENTRICULAR ASSIST DEVICE;  Surgeon: Luis F Paige MD;  Location: [facility] OR [address] [place] [address];  Service: Cardiovascular;  Laterality:  Right;    IRRIGATION OF MEDIASTINUM  6/30/2022    Procedure: IRRIGATION, MEDIASTINUM;  Surgeon: Luis F Paige MD;  [facility];  Service: Cardiovascular;;    LEFT VENTRICULAR ASSIST DEVICE Left 6/23/2022    Procedure: INSERTION-LEFT VENTRICULAR ASSIST DEVICE;  Surgeon: Luis F Paige MD;  Location: [facility] OR [facility];  Service: Cardiovascular;  Laterality: Left;    LEFT VENTRICULAR ASSIST DEVICE N/A 6/29/2022    Procedure: INSERTION-LEFT VENTRICULAR ASSIST DEVICE;  Surgeon: Luis F Paige MD;  Location: [facility] OR [facility];  Service: Cardiovascular;  Laterality: N/A;    REVISION OF SKIN POCKET FOR CARDIOVERTER-DEFIBRILLATOR  11/9/2023    Procedure: REVISION, SKIN POCKET, FOR CARDIOVERTER-DEFIBRILLATOR;  Surgeon: ADALID Leon MD;  Location: [facility] EP LAB;  Service: Cardiology;;    RIGHT HEART CATHETERIZATION Right 4/8/2022    Procedure: INSERTION, CATHETER, RIGHT HEART;  Surgeon: Luca Lopez Jr., MD;  Location: [facility] CATH LAB;  Service: Cardiology;  Laterality: Right;    RIGHT HEART CATHETERIZATION Right 4/19/2022    Procedure: INSERTION, CATHETER, RIGHT HEART;  Surgeon: Josh Pulido MD;  Location: [facility] CATH LAB;  Service: Cardiology;  Laterality: Right;    RIGHT HEART CATHETERIZATION Right 7/21/2022    Procedure: INSERTION, CATHETER, RIGHT HEART;  Surgeon: Dalia Crum MD;  Location: [facility] CATH LAB;  Service: Cardiology;  Laterality: Right;    RIGHT HEART CATHETERIZATION Right 10/31/2022    Procedure: INSERTION, CATHETER, RIGHT HEART;  Surgeon: Dalia Crum MD;  Location: [facility] CATH LAB;  Service: Cardiology;  Laterality: Right;    STERNAL WOUND CLOSURE N/A 6/30/2022    Procedure: CLOSURE, WOUND, STERNUM;  Surgeon: Luis F Paige MD;  [facility];  Service: Cardiovascular;  Laterality: N/A;       Review of patient's allergies indicates:   Allergen Reactions    Torsemide Hives       Medications:  Medications Prior to Admission   Medication Sig    acetaminophen (TYLENOL)  325 MG tablet Take 2 tablets (650 mg total) by mouth every 6 (six) hours as needed for Pain.    amiodarone (PACERONE) 200 MG Tab Take 1 tablet (200 mg total) by mouth once daily.    amLODIPine (NORVASC) 5 MG tablet Take 1 tablet (5 mg total) by mouth once daily.    aspirin (ECOTRIN) 81 MG EC tablet Take 1 tablet (81 mg total) by mouth once daily.    atorvastatin (LIPITOR) 40 MG tablet Take 1 tablet (40 mg total) by mouth once daily.    [] cyclobenzaprine (FLEXERIL) 5 MG tablet Take 1 tablet (5 mg total) by mouth daily as needed for Muscle spasms. To be taken prior to daily lasix dose.    DULoxetine (CYMBALTA) 30 MG capsule Take 1 capsule (30 mg total) by mouth once daily.    eplerenone (INSPRA) 25 MG Tab Take 1 tablet (25 mg total) by mouth once daily.    fluconazole (DIFLUCAN) 200 MG Tab Take 2 tablets (400 mg total) by mouth once daily.    furosemide (LASIX) 80 MG tablet Take 1 tablet (80 mg total) by mouth once daily.    gabapentin (NEURONTIN) 100 MG capsule Take 1 capsule (100 mg total) by mouth 2 (two) times daily AND 4 capsules (400 mg total) every evening.    insulin aspart U-100 (NOVOLOG) 100 unit/mL (3 mL) InPn pen Inject 18 Units into the skin 3 (three) times daily with meals: MAX 94 units/daily  150-200    201-250    251-300    301-350    >350  +2 units   +4 units    +6 units    +8 units    +10 units    lancets 33 gauge Misc Use to test blood glucose 4 (four) times daily.    levETIRAcetam (KEPPRA) 1000 MG tablet Take 1.5 tablets (1,500 mg total) by mouth 2 (two) times daily.    magnesium oxide (MAG-OX) 400 mg (241.3 mg magnesium) tablet Take 1 tablet (400 mg total) by mouth once daily.    ondansetron (ZOFRAN-ODT) 4 MG TbDL Take 1 tablet (4 mg total) by mouth every 8 (eight) hours as needed (nausea).    pantoprazole (PROTONIX) 40 MG tablet Take 1 tablet (40 mg total) by mouth once daily.    potassium chloride SA (K-DUR,KLOR-CON M) 10 MEQ tablet Take 3 tablets (30 mEq total) by mouth once daily.     "warfarin (COUMADIN) 3 MG tablet Take 0.5 tablets (1.5 mg total) by mouth Daily.    blood sugar diagnostic Strp Use to test blood glucose 4 (four) times daily.    blood-glucose meter (TRUE METRIX GLUCOSE METER) Misc Use to test blood glucose 4 (four) times daily.    blood-glucose sensor (FREESTYLE LUCIUS 3 SENSOR) Dee 1 Device by Misc.(Non-Drug; Combo Route) route every 14 (fourteen) days.    D5W PgBk 50 mL with ceFAZolin 2 gram SolR 6 g Inject 6 g into the vein once daily.    glucagon (BAQSIMI) 3 mg/actuation Spry 1 each by Nasal route as needed (hypoglycemia).    insulin detemir U-100, Levemir, 100 unit/mL (3 mL) SubQ InPn pen Inject 12 Units into the skin 2 (two) times daily. In the morning and before bed.    pen needle, diabetic 32 gauge x 5/32" Ndle 1 each by Misc.(Non-Drug; Combo Route) route 4 (four) times daily.     Antibiotics (From admission, onward)      Start     Stop Route Frequency Ordered    09/23/24 1500  ceFAZolin (ANCEF) 6 g in D5W 500 mL CONTINUOUS INFUSION         -- IV Every 24 hours (non-standard times) 09/23/24 1304          Antifungals (From admission, onward)      Start     Stop Route Frequency Ordered    09/23/24 1330  fluconazole tablet 400 mg         -- Oral Daily 09/23/24 1219          Antivirals (From admission, onward)      None             Immunization History   Administered Date(s) Administered    COVID-19, MRNA, LN-S, PF (Pfizer) (Purple Cap) 12/08/2021, 12/29/2021    DTP 1985, 1985, 1985, 06/01/1987    HIB 02/05/1990    Influenza - Quadrivalent - PF *Preferred* (6 months and older) 10/16/2020, 09/24/2021, 12/16/2022    MMR 06/01/1987    OPV 1985, 1985, 06/01/1987       Family History       Problem Relation (Age of Onset)    Diverticulosis Brother    Heart attack Maternal Grandmother, Maternal Grandfather    Heart failure Father          Social History     Socioeconomic History    Marital status: Significant Other   Tobacco Use    Smoking status: Former "     Current packs/day: 0.00     Average packs/day: 0.5 packs/day for 16.6 years (8.3 ttl pk-yrs)     Types: Cigarettes     Start date: 10/1/2004     Quit date: 4/23/2021     Years since quitting: 3.4    Smokeless tobacco: Never   Substance and Sexual Activity    Alcohol use: Not Currently    Drug use: Not Currently     Types: Marijuana, MDMA (Ecstacy)    Sexual activity: Yes     Partners: Female     Birth control/protection: None     Social Determinants of Health     Financial Resource Strain: Low Risk  (8/26/2024)    Overall Financial Resource Strain (CARDIA)     Difficulty of Paying Living Expenses: Not hard at all   Food Insecurity: No Food Insecurity (8/26/2024)    Hunger Vital Sign     Worried About Running Out of Food in the Last Year: Never true     Ran Out of Food in the Last Year: Never true   Transportation Needs: No Transportation Needs (8/26/2024)    TRANSPORTATION NEEDS     Transportation : No   Physical Activity: Unknown (3/21/2024)    Exercise Vital Sign     Days of Exercise per Week: Patient declined     Minutes of Exercise per Session: 0 min   Stress: No Stress Concern Present (8/26/2024)    [employer] - Occupational Stress Questionnaire     Feeling of Stress : Not at all   Housing Stability: Low Risk  (8/26/2024)    Housing Stability Vital Sign     Unable to Pay for Housing in the Last Year: No     Homeless in the Last Year: No     Review of Systems   Constitutional:  Negative for chills and fever.   Eyes:  Negative for visual disturbance.   Gastrointestinal:  Positive for blood in stool.   Musculoskeletal:  Positive for back pain.   All other systems reviewed and are negative.    Objective:     Vital Signs (Most Recent):  Temp: 97.4 °F (36.3 °C) (09/24/24 1203)  Pulse: 82 (09/24/24 1203)  Resp: 20 (09/24/24 1203)  BP: (!) 74/0 (09/24/24 1207)  SpO2: 100 % (09/24/24 0455) Vital Signs (24h Range):  Temp:  [97.2 °F (36.2 °C)-98.5 °F (36.9 °C)] 97.4 °F (36.3 °C)  Pulse:   [76-97] 82  Resp:  [18-20] 20  SpO2:  [98 %-100 %] 100 %  BP: ()/(0-66) 74/0     Weight: 74.5 kg (164 lb 3.9 oz)  Body mass index is 20.53 kg/m².    Estimated Creatinine Clearance: 116.1 mL/min (based on SCr of 0.9 mg/dL).     Physical Exam  Constitutional:       General: He is not in acute distress.     Appearance: He is not ill-appearing.   HENT:      Head: Normocephalic and atraumatic.   Eyes:      General:         Right eye: No discharge.         Left eye: No discharge.   Abdominal:      Tenderness: There is no right CVA tenderness or left CVA tenderness.      Comments: DLES dressed   Skin:     General: Skin is warm and dry.      Comments: R SCL picc dressed - does not appear dressing was done recently   Neurological:      Mental Status: He is alert and oriented to person, place, and time.          Significant Labs:   Microbiology Results (last 7 days)       ** No results found for the last 168 hours. **            Significant Imaging: I have reviewed all pertinent imaging results/findings within the past 24 hours.

## 2024-09-24 NOTE — PROGRESS NOTES
[facility] - Cardiology Stepdown  Heart Transplant  Progress Note    Patient Name: Kevan Queen  MRN: [id number]  Admission Date: 9/23/2024  Hospital Length of Stay: 1 days  Attending Physician: Dalia Crum MD  Primary Care Provider: Rosio Armendariz FNP  Principal Problem:<principal problem not specified>    Subjective:   Interval History: Admitted yesterday with GIB. Hgb dropped to 7.0 from 7.6 yesterday. Patient had a BM overnight with maroon blood and clots. Additional BM this morning. GI consulted, plan for scope tomorrow. Will repeat CBC at noon and plan to transfuse PRN (consent in patient's chart). Will hold coumadin with active bleed.     Continuous Infusions:  Scheduled Meds:   alteplase  2 mg Intra-Catheter Once    amiodarone  200 mg Oral Daily    amLODIPine  5 mg Oral Daily    aspirin  81 mg Oral Daily    atorvastatin  40 mg Oral Daily    ceFAZolin (ANCEF) 6 g in D5W 500 mL CONTINUOUS INFUSION  6 g Intravenous Q24H    DULoxetine  30 mg Oral Daily    eplerenone  25 mg Oral Daily    fluconazole  400 mg Oral Daily    furosemide (LASIX) injection  80 mg Intravenous TID    gabapentin  100 mg Oral Daily    And    gabapentin  400 mg Oral QHS    hydrocortisone  25 mg Rectal BID    insulin glargine U-100  21 Units Subcutaneous Daily    levETIRAcetam  1,500 mg Oral BID    magnesium oxide  400 mg Oral Daily    pantoprazole  40 mg Oral Daily    polyethylene glycol  4,000 mL Oral Once    potassium chloride  30 mEq Oral Daily     PRN Meds:  Current Facility-Administered Medications:     acetaminophen, 650 mg, Oral, Q6H PRN    cyclobenzaprine, 5 mg, Oral, Daily PRN    dextrose 10%, 12.5 g, Intravenous, PRN    dextrose 10%, 25 g, Intravenous, PRN    glucagon (human recombinant), 1 mg, Intramuscular, PRN    glucose, 16 g, Oral, PRN    glucose, 24 g, Oral, PRN    insulin aspart U-100, 0-10 Units, Subcutaneous, QID (AC + HS) PRN    ondansetron, 4 mg, Oral, Q8H PRN    Review of patient's allergies indicates:  "  Allergen Reactions    Torsemide Hives     Objective:     Vital Signs (Most Recent):  Temp: 98 °F (36.7 °C) (09/24/24 0744)  Pulse: 79 (09/24/24 0744)  Resp: 18 (09/24/24 0455)  BP: (!) 76/0 (09/24/24 0744)  SpO2: 100 % (09/24/24 0455) Vital Signs (24h Range):  Temp:  [97.2 °F (36.2 °C)-98.5 °F (36.9 °C)] 98 °F (36.7 °C)  Pulse:  [76-97] 79  Resp:  [18-19] 18  SpO2:  [98 %-100 %] 100 %  BP: ()/(0-66) 76/0     Patient Vitals for the past 72 hrs (Last 3 readings):   Weight   09/23/24 1331 74.5 kg (164 lb 3.9 oz)     Body mass index is 20.53 kg/m².      Intake/Output Summary (Last 24 hours) at 9/24/2024 1102  Last data filed at 9/24/2024 0305  Gross per 24 hour   Intake 240 ml   Output 2902 ml   Net -2662 ml       Hemodynamic Parameters:              Physical Exam  Vitals reviewed.   HENT:      Head: Normocephalic.      Nose: Nose normal.   Eyes:      Conjunctiva/sclera: Conjunctivae normal.   Cardiovascular:      Rate and Rhythm: Normal rate and regular rhythm.      Comments: Smooth VAD hum   Pulmonary:      Effort: Pulmonary effort is normal.   Abdominal:      General: Abdomen is flat.      Tenderness: There is no abdominal tenderness.   Musculoskeletal:         General: Normal range of motion.      Cervical back: Normal range of motion.   Skin:     General: Skin is warm.      Capillary Refill: Capillary refill takes less than 2 seconds.   Neurological:      General: No focal deficit present.      Mental Status: He is alert.   Psychiatric:         Mood and Affect: Mood normal.         Behavior: Behavior normal.         Thought Content: Thought content normal.         Judgment: Judgment normal.            Significant Labs:  CBC:  Recent Labs   Lab 09/23/24  0205 09/23/24  2316 09/24/24  0313   WBC 9.58 9.35 8.56   RBC 4.25* 3.79* 3.69*   HGB 8.3* 7.6* 7.0*   HCT 29.0* 25.5* 25.2*    251 232   MCV 68.2* 67* 68*   MCH 19.5* 20.1* 19.0*   MCHC 28.6* 29.8* 27.8*     BNP:  No results for input(s): "BNP" in " "the last 168 hours.    Invalid input(s): "BNPTRIAGELBLO"  CMP:  Recent Labs   Lab 09/17/24  1256 09/23/24  0205 09/24/24 0312   GLU  --   --  256*   CALCIUM 8.4 8.5 8.6*   ALBUMIN 2.3* 2.5*  --    * 132* 132*   K 3.4* 3.3* 3.1*   CO2 26 28 30*   CL 95* 93* 95   BUN 12.9 13.0 13   CREATININE 1.03 1.33* 0.9   ALKPHOS 219* 264*  --    ALT <5 12  --    AST 30 40*  --    BILITOT 1.3 1.3  --       Coagulation:   Recent Labs   Lab 09/23/24  0205 09/23/24  2316 09/24/24 0313   INR 2.1* 2.0* 2.0*   APTT 33.1  --  37.0*     LDH:  Recent Labs   Lab 09/24/24 0313   *     Microbiology:  Microbiology Results (last 7 days)       ** No results found for the last 168 hours. **            I have reviewed all pertinent labs within the past 24 hours.    Estimated Creatinine Clearance: 116.1 mL/min (based on SCr of 0.9 mg/dL).    Diagnostic Results:  I have reviewed all pertinent imaging results/findings within the past 24 hours.  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  He was weaned off  but he had to restarted due to RVF and transitioned to milrinone (secondary to  shortage). Most recently admitted on 9/10-9/12 for pyelonephritis with Bcx positive for yeast. Had many GOC discussions but he did not want to be DNR/DNI, and wants to continue aggressive care. He was weaned off milrinone but was discharged with PICC for IV antibiotics. Transferred after presenting to the ED last night after one episode of bright red blood per rectum yesterday evening. Pt states he has had rectal pain for a few days. His mother found an external hemorrhoid and was able to reduce with manual " pressure in the rectum. H/H stable. INR 2.1.     Rectal bleeding  -Rectal bleeding x 1 PTA.  External hemorrhoid noted by mother that she then reduced   -2 bloody BM during admission   -H/H trending down   -GI consulted, planning to do colonoscopy tomorrow     LVAD (left ventricular assist device) present  -HeartMate 3 Implanted 6/23/2022 with early RV failure requiring RVAD with ProTek Duo   -Hold Coumadin,  Goal INR 2.0-3.0 . Therapeutic today, but actively bleeding   -Antiplatelets ASA 81 mg  -LDH is stable overall today. Will continue to monitor daily.  -Speed set at 5100, LSL 4700 rpm  -Interrogation notable for no events  -Not listed for OHTx          Procedure: Device Interrogation Including analysis of device parameters  Current Settings: Ventricular Assist Device  Review of device function is stable/unstable stable        9/24/2024     7:50 AM 9/24/2024     1:00 AM 9/23/2024     8:11 PM 9/23/2024     4:19 PM 9/23/2024    12:49 PM 9/23/2024    10:26 AM 9/9/2024     7:53 PM   TXP LVAD INTERROGATIONS   Type HeartMate3 HeartMate3 HeartMate3 HeartMate3 HeartMate3 HeartMate3 HeartMate3   Flow 4 4 4 4 3.8 4.3 3.6   Speed 5100 5100 5100 5100 5100 5100 5100   PI 5.6 5.9 6.3 5.7 5.8 4.5 7.3   Power (Serna) 3.5 3.6 3.6 3.6 3.6 3.6 3.5   LSL 4700 4700 4700 4700 4700 4700 4700   Pulsatility No Pulse No Pulse No Pulse No Pulse No Pulse No Pulse Intermittent pulse         Type 2 diabetes mellitus with hyperglycemia  -IDDM  -Endocrinology consulted         Denise Espinoza PA-C  Heart Transplant  [facility] - Cardiology Stepdown

## 2024-09-24 NOTE — NURSING
" had another bloody "bm". He said he felt he had to have a bowel movement and he thought he did.However, no stool noticed on pad.Only blood clots as the previous image in notes. Labs are being drawn at this time.  notified via secure chat.  "

## 2024-09-24 NOTE — ASSESSMENT & PLAN NOTE
Impression:  Kevan Queen is a 39 y.o. 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 Patient initially presented to [facility] ED on 9/23/24 with complaints of bloody stools, rectal pain, and an external hemorrhoid that was reduced with manual pressure prior to going to ED. Patient was then transferred to [facility] for HLOC, GI evaluation, and history of LVAD.     Patient is currently resting in bed, eating breakfast, and watching television.      Palliative Medicine was consulted by primary, Dr. Crum, for goals of care and advanced care planning discussions.      Advance Care Planning     Date: 09/24/2024    [facility]  I engaged the patient in a voluntary conversation about advance care planning and we specifically addressed what the goals of care would be moving forward, in light of the patient's change in clinical status, specifically patient's extensive cardiac history.  We did not specifically address the patient's likely prognosis, which is  pending GI work-up .  We explored the patient's values and preferences for future care.  The patient endorses that what is most important right now is to focus on symptom/pain control, extending life as long as possible, even it it means sacrificing quality, and curative/life-prolongation (regardless of treatment burdens)    Accordingly, we have decided that the best plan to meet the patient's goals includes continuing with treatment      ACP Reviewed/No Changes  Voluntary advance care planning discussion had today with patient. Previously completed HCPOA in electronic medical record is current, no changes made.                     Life Limiting  Diagnosis  LVAD  Rectal Bleeding      Symptom Management  Pain  - PRN tylenol 650mg estephania 6 hours  - Can consider addition of hydrocodone-acetaminophen 5-325mg PRN pain every 4-6 hours    Dyspnea  - Patient denied    Insomnia  - Patient denied        Recommendations  - Continue management per primary team  - Patient and family would benefit from continued education and information regarding plan of care, prognosis, and what to expect in the future  - I will continue to follow.        Thank you for consulting Palliative Medicine.

## 2024-09-24 NOTE — ASSESSMENT & PLAN NOTE
Cparapsilosis fungemia    I independently reviewed patient's lab work and images as documented. 38 yo male with HM3 as DT c/b recurrent DLESI with recent admission for MSSA DLESI/bacteremia and Cparapsilosis fungemia (maintained on prolong course of cefazolin/fluc) admitted for BRBPR. Plan for cscope tomorrow.  Labs notable for no leukocytosis, cr 1.33, now improved to 0.9.     Recommendations:  -continue cefazolin 6g continuous infusion iv daily, maria isabel 10/9 for 6w course  -continue fluconazole 400 mg daily, maria isabel 10/19 for 6w course   -monitor QTC and DDI

## 2024-09-24 NOTE — PT/OT/SLP PROGRESS
Occupational Therapy      Patient Name:  Kevan Queen   MRN:  [id number]    Patient not seen today. Per nsg and chart review, pt with decreased Hgb values and active GI bleed with colonoscopy scheduled for 9/25. Will follow-up as medically appropriate.    9/24/2024

## 2024-09-24 NOTE — ASSESSMENT & PLAN NOTE
-Rectal bleeding x 1 PTA.  External hemorrhoid noted by mother that she then reduced   -2 bloody BM during admission   -H/H trending down   -GI consulted, planning to do colonoscopy tomorrow

## 2024-09-24 NOTE — HPI
40 yo male with HM3 as DT c/b recurrent DLESI with recent admission for MSSA DLESI/bacteremia and Cparapsilosis fungemia (maintained on prolong course of cefazolin/fluc) admitted for BRBPR. Pt reported having constipation prior to admission with associated straining with subsequent BRPBR. He denied fevers, chills, issues with PICC or IV abx. Recently restarted taking fluconazole after prescription was re-sent. Pt is currently on cefazolin and fluconazole. Plan for cscope tomorrow.

## 2024-09-24 NOTE — PROGRESS NOTES
Heart Transplant  attempted to reach pt  by phone to complete admit assessment, however pt is not currently available. Transplant Social Workers to follow.

## 2024-09-24 NOTE — CONSULTS
[facility] - Cardiology Stepdown  Infectious Disease  Consult Note    Patient Name: Kevan Queen  MRN: [id number]  Admission Date: 9/23/2024  Hospital Length of Stay: 1 days  Attending Physician: Dalia Crum MD  Primary Care Provider: Rosio Armendariz FNP     Isolation Status: No active isolations    Patient information was obtained from patient, past medical records, ER records, and primary team.      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 40 yo male with HM3 as DT c/b recurrent DLESI with recent admission for MSSA DLESI/bacteremia and Cparapsilosis fungemia (maintained on prolong course of cefazolin/fluc) admitted for BRBPR. Plan for cscope tomorrow.  Labs notable for no leukocytosis, cr 1.33, now improved to 0.9.     Recommendations:  -continue cefazolin 6g continuous infusion iv daily, maria isabel 10/9 for 6w course  -continue fluconazole 400 mg daily, maria isabel 10/19 for 6w course   -monitor QTC and DDI            Thank you for your consult. I will follow-up with patient. Please contact us if you have any additional questions. Above d/w primary team.       Laura Baldwin MD  Infectious Disease  [facility] - Cardiology Stepdown    Subjective:     Principal Problem: <principal problem not specified>    HPI: 40 yo male with HM3 as DT c/b recurrent DLESI with recent admission for MSSA DLESI/bacteremia and Cparapsilosis fungemia (maintained on prolong course of cefazolin/fluc) admitted for BRBPR. Pt reported having constipation prior to admission with associated straining with subsequent BRPBR. He denied fevers, chills, issues with PICC or IV abx. Recently restarted taking fluconazole after prescription was re-sent. Pt is  currently on cefazolin and fluconazole. Plan for cscope tomorrow.      Past Medical History:   Diagnosis Date    Acute respiratory failure with hypoxia 07/22/2023    Arthritis     Awareness alteration, transient 09/01/2022    Cardiomyopathy     CHF (congestive heart failure) 10/01/2020    COVID-19 06/03/2023    Diabetes mellitus     Dilated cardiomyopathy 10/26/2020    Drug abuse 10/2020    Headache 04/19/2023    Hyperglycemia 12/16/2022    Hyperosmolar hyperglycemic state (HHS) 05/25/2022    ICD (implantable cardioverter-defibrillator) in place 10/26/2020    Infected defibrillator now s/p removal Nov 20232023 07/23/2023    Left ventricular assist device (LVAD) complication, initial encounter 06/05/2023    Muscle cramping 06/15/2022    Renal disorder     SOB (shortness of breath) 06/13/2022    Tingling in extremities 07/13/2022       Past Surgical History:   Procedure Laterality Date    APPLICATION OF WOUND VACUUM-ASSISTED CLOSURE DEVICE N/A 6/30/2022    Procedure: APPLICATION, WOUND VAC;  Surgeon: Luis F Paige MD;  Location: [facility] OR [address] [place] [address];  Service: Cardiovascular;  Laterality: N/A;  50 x 5 cm    CARDIAC DEFIBRILLATOR PLACEMENT      ECHOCARDIOGRAM,TRANSESOPHAGEAL  11/9/2023    Procedure: Transesophageal echo (GUILLERMO) intra-procedure log documentation;  Surgeon: Eron Ivy MD;  Location: [facility] EP LAB;  Service: Cardiology;;    EXTRACTION, ELECTRODE LEAD Left 11/9/2023    Procedure: EXTRACTION, ELECTRODE LEAD;  Surgeon: ADALID Leon MD;  Location: [facility] EP LAB;  Service: Cardiology;  Laterality: Left;  INFECTION, LEAD EXTRACTION, GUILLERMO, BSCI, ANES, EH,   *NO CTS BACKUP*    IMPLANTATION OF RIGHT VENTRICULAR ASSIST DEVICE (RVAD) N/A 6/29/2022    Procedure: INSERTION, RVAD;  Surgeon: Luis F Paige MD;  Location: [facility] OR [address] [place] [address];  Service: Cardiovascular;  Laterality: N/A;    INSERTION OF GRAFT TO PERICARDIUM Right 6/30/2022    Procedure: INSERTION-RIGHT VENTRICULAR ASSIST DEVICE;  Surgeon: Luis F Paige,  MD;  Location: [address] FLR;  Service: Cardiovascular;  Laterality: Right;    IRRIGATION OF MEDIASTINUM  6/30/2022    Procedure: IRRIGATION, MEDIASTINUM;  Surgeon: Luis F Paige MD;  [address];  Service: Cardiovascular;;    LEFT VENTRICULAR ASSIST DEVICE Left 6/23/2022    Procedure: INSERTION-LEFT VENTRICULAR ASSIST DEVICE;  Surgeon: Luis F Paige MD;  [facility];  Service: Cardiovascular;  Laterality: Left;    LEFT VENTRICULAR ASSIST DEVICE N/A 6/29/2022    Procedure: INSERTION-LEFT VENTRICULAR ASSIST DEVICE;  Surgeon: Luis F Paige MD;  [facility];  Service: Cardiovascular;  Laterality: N/A;    REVISION OF SKIN POCKET FOR CARDIOVERTER-DEFIBRILLATOR  11/9/2023    Procedure: REVISION, SKIN POCKET, FOR CARDIOVERTER-DEFIBRILLATOR;  Surgeon: ADALID Leon MD;  Location: [facility] EP LAB;  Service: Cardiology;;    RIGHT HEART CATHETERIZATION Right 4/8/2022    Procedure: INSERTION, CATHETER, RIGHT HEART;  Surgeon: Luca Lopez Jr., MD;  Location: [facility] CATH LAB;  Service: Cardiology;  Laterality: Right;    RIGHT HEART CATHETERIZATION Right 4/19/2022    Procedure: INSERTION, CATHETER, RIGHT HEART;  Surgeon: Josh Pulido MD;  Location: [facility] CATH LAB;  Service: Cardiology;  Laterality: Right;    RIGHT HEART CATHETERIZATION Right 7/21/2022    Procedure: INSERTION, CATHETER, RIGHT HEART;  Surgeon: Dalia Crum MD;  Location: [facility] CATH LAB;  Service: Cardiology;  Laterality: Right;    RIGHT HEART CATHETERIZATION Right 10/31/2022    Procedure: INSERTION, CATHETER, RIGHT HEART;  Surgeon: Dalia Crum MD;  Location: [facility] CATH LAB;  Service: Cardiology;  Laterality: Right;    STERNAL WOUND CLOSURE N/A 6/30/2022    Procedure: CLOSURE, WOUND, STERNUM;  Surgeon: Luis F Paige MD;  Location: [address];  Service: Cardiovascular;  Laterality: N/A;       Review of patient's allergies indicates:   Allergen Reactions    Torsemide Hives        Medications:  Medications Prior to Admission   Medication Sig    acetaminophen (TYLENOL) 325 MG tablet Take 2 tablets (650 mg total) by mouth every 6 (six) hours as needed for Pain.    amiodarone (PACERONE) 200 MG Tab Take 1 tablet (200 mg total) by mouth once daily.    amLODIPine (NORVASC) 5 MG tablet Take 1 tablet (5 mg total) by mouth once daily.    aspirin (ECOTRIN) 81 MG EC tablet Take 1 tablet (81 mg total) by mouth once daily.    atorvastatin (LIPITOR) 40 MG tablet Take 1 tablet (40 mg total) by mouth once daily.    [] cyclobenzaprine (FLEXERIL) 5 MG tablet Take 1 tablet (5 mg total) by mouth daily as needed for Muscle spasms. To be taken prior to daily lasix dose.    DULoxetine (CYMBALTA) 30 MG capsule Take 1 capsule (30 mg total) by mouth once daily.    eplerenone (INSPRA) 25 MG Tab Take 1 tablet (25 mg total) by mouth once daily.    fluconazole (DIFLUCAN) 200 MG Tab Take 2 tablets (400 mg total) by mouth once daily.    furosemide (LASIX) 80 MG tablet Take 1 tablet (80 mg total) by mouth once daily.    gabapentin (NEURONTIN) 100 MG capsule Take 1 capsule (100 mg total) by mouth 2 (two) times daily AND 4 capsules (400 mg total) every evening.    insulin aspart U-100 (NOVOLOG) 100 unit/mL (3 mL) InPn pen Inject 18 Units into the skin 3 (three) times daily with meals: MAX 94 units/daily  150-200    201-250    251-300    301-350    >350  +2 units   +4 units    +6 units    +8 units    +10 units    lancets 33 gauge Misc Use to test blood glucose 4 (four) times daily.    levETIRAcetam (KEPPRA) 1000 MG tablet Take 1.5 tablets (1,500 mg total) by mouth 2 (two) times daily.    magnesium oxide (MAG-OX) 400 mg (241.3 mg magnesium) tablet Take 1 tablet (400 mg total) by mouth once daily.    ondansetron (ZOFRAN-ODT) 4 MG TbDL Take 1 tablet (4 mg total) by mouth every 8 (eight) hours as needed (nausea).    pantoprazole (PROTONIX) 40 MG tablet Take 1 tablet (40 mg total) by mouth once daily.    potassium  "chloride SA (K-DUR,KLOR-CON M) 10 MEQ tablet Take 3 tablets (30 mEq total) by mouth once daily.    warfarin (COUMADIN) 3 MG tablet Take 0.5 tablets (1.5 mg total) by mouth Daily.    blood sugar diagnostic Strp Use to test blood glucose 4 (four) times daily.    blood-glucose meter (TRUE METRIX GLUCOSE METER) Misc Use to test blood glucose 4 (four) times daily.    blood-glucose sensor (FREESTYLE LUCIUS 3 SENSOR) Dee 1 Device by Misc.(Non-Drug; Combo Route) route every 14 (fourteen) days.    D5W PgBk 50 mL with ceFAZolin 2 gram SolR 6 g Inject 6 g into the vein once daily.    glucagon (BAQSIMI) 3 mg/actuation Spry 1 each by Nasal route as needed (hypoglycemia).    insulin detemir U-100, Levemir, 100 unit/mL (3 mL) SubQ InPn pen Inject 12 Units into the skin 2 (two) times daily. In the morning and before bed.    pen needle, diabetic 32 gauge x 5/32" Ndle 1 each by Misc.(Non-Drug; Combo Route) route 4 (four) times daily.     Antibiotics (From admission, onward)      Start     Stop Route Frequency Ordered    09/23/24 1500  ceFAZolin (ANCEF) 6 g in D5W 500 mL CONTINUOUS INFUSION         -- IV Every 24 hours (non-standard times) 09/23/24 1304          Antifungals (From admission, onward)      Start     Stop Route Frequency Ordered    09/23/24 1330  fluconazole tablet 400 mg         -- Oral Daily 09/23/24 1219          Antivirals (From admission, onward)      None             Immunization History   Administered Date(s) Administered    COVID-19, MRNA, LN-S, PF (Pfizer) (Purple Cap) 12/08/2021, 12/29/2021    DTP 1985, 1985, 1985, 06/01/1987    HIB 02/05/1990    Influenza - Quadrivalent - PF *Preferred* (6 months and older) 10/16/2020, 09/24/2021, 12/16/2022    MMR 06/01/1987    OPV 1985, 1985, 06/01/1987       Family History       Problem Relation (Age of Onset)    Diverticulosis Brother    Heart attack Maternal Grandmother, Maternal Grandfather    Heart failure Father          Social History "     Socioeconomic History    Marital status: Significant Other   Tobacco Use    Smoking status: Former     Current packs/day: 0.00     Average packs/day: 0.5 packs/day for 16.6 years (8.3 ttl pk-yrs)     Types: Cigarettes     Start date: 10/1/2004     Quit date: 4/23/2021     Years since quitting: 3.4    Smokeless tobacco: Never   Substance and Sexual Activity    Alcohol use: Not Currently    Drug use: Not Currently     Types: Marijuana, MDMA (Ecstacy)    Sexual activity: Yes     Partners: Female     Birth control/protection: None     Social Determinants of Health     Financial Resource Strain: Low Risk  (8/26/2024)    Overall Financial Resource Strain (CARDIA)     Difficulty of Paying Living Expenses: Not hard at all   Food Insecurity: No Food Insecurity (8/26/2024)    Hunger Vital Sign     Worried About Running Out of Food in the Last Year: Never true     Ran Out of Food in the Last Year: Never true   Transportation Needs: No Transportation Needs (8/26/2024)    TRANSPORTATION NEEDS     Transportation : No   Physical Activity: Unknown (3/21/2024)    Exercise Vital Sign     Days of Exercise per Week: Patient declined     Minutes of Exercise per Session: 0 min   Stress: No Stress Concern Present (8/26/2024)    [employer] - Occupational Stress Questionnaire     Feeling of Stress : Not at all   Housing Stability: Low Risk  (8/26/2024)    Housing Stability Vital Sign     Unable to Pay for Housing in the Last Year: No     Homeless in the Last Year: No     Review of Systems   Constitutional:  Negative for chills and fever.   Eyes:  Negative for visual disturbance.   Gastrointestinal:  Positive for blood in stool.   Musculoskeletal:  Positive for back pain.   All other systems reviewed and are negative.    Objective:     Vital Signs (Most Recent):  Temp: 97.4 °F (36.3 °C) (09/24/24 1203)  Pulse: 82 (09/24/24 1203)  Resp: 20 (09/24/24 1203)  BP: (!) 74/0 (09/24/24 1207)  SpO2: 100 % (09/24/24  0455) Vital Signs (24h Range):  Temp:  [97.2 °F (36.2 °C)-98.5 °F (36.9 °C)] 97.4 °F (36.3 °C)  Pulse:  [76-97] 82  Resp:  [18-20] 20  SpO2:  [98 %-100 %] 100 %  BP: ()/(0-66) 74/0     Weight: 74.5 kg (164 lb 3.9 oz)  Body mass index is 20.53 kg/m².    Estimated Creatinine Clearance: 116.1 mL/min (based on SCr of 0.9 mg/dL).     Physical Exam  Constitutional:       General: He is not in acute distress.     Appearance: He is not ill-appearing.   HENT:      Head: Normocephalic and atraumatic.   Eyes:      General:         Right eye: No discharge.         Left eye: No discharge.   Abdominal:      Tenderness: There is no right CVA tenderness or left CVA tenderness.      Comments: DLES dressed   Skin:     General: Skin is warm and dry.      Comments: R SCL picc dressed - does not appear dressing was done recently   Neurological:      Mental Status: He is alert and oriented to person, place, and time.          Significant Labs:   Microbiology Results (last 7 days)       ** No results found for the last 168 hours. **            Significant Imaging: I have reviewed all pertinent imaging results/findings within the past 24 hours.

## 2024-09-24 NOTE — TELEPHONE ENCOUNTER
Megan with child protective service called to follow up on report I made last week.  Confirmed intake number with her.  Spent 30 minutes on the phone reviewing the information I gave last week. She wanted to be sure she had everything straight. 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  She said next step is to investigate.

## 2024-09-24 NOTE — SUBJECTIVE & OBJECTIVE
Interval History: Palliative Medicine reintroduced to patient. Patient amenable to GOC/ACP discussions.    Past Medical History:   Diagnosis Date    Acute respiratory failure with hypoxia 07/22/2023    Arthritis     Awareness alteration, transient 09/01/2022    Cardiomyopathy     CHF (congestive heart failure) 10/01/2020    COVID-19 06/03/2023    Diabetes mellitus     Dilated cardiomyopathy 10/26/2020    Drug abuse 10/2020    Headache 04/19/2023    Hyperglycemia 12/16/2022    Hyperosmolar hyperglycemic state (HHS) 05/25/2022    ICD (implantable cardioverter-defibrillator) in place 10/26/2020    Infected defibrillator now s/p removal Nov 20232023 07/23/2023    Left ventricular assist device (LVAD) complication, initial encounter 06/05/2023    Muscle cramping 06/15/2022    Renal disorder     SOB (shortness of breath) 06/13/2022    Tingling in extremities 07/13/2022       Past Surgical History:   Procedure Laterality Date    APPLICATION OF WOUND VACUUM-ASSISTED CLOSURE DEVICE N/A 6/30/2022    Procedure: APPLICATION, WOUND VAC;  Surgeon: Luis F Paige MD;  Location: [facility] OR [address] [place] [address];  Service: Cardiovascular;  Laterality: N/A;  50 x 5 cm    CARDIAC DEFIBRILLATOR PLACEMENT      ECHOCARDIOGRAM,TRANSESOPHAGEAL  11/9/2023    Procedure: Transesophageal echo (GUILLERMO) intra-procedure log documentation;  Surgeon: Eron Ivy MD;  Location: [facility] EP LAB;  Service: Cardiology;;    EXTRACTION, ELECTRODE LEAD Left 11/9/2023    Procedure: EXTRACTION, ELECTRODE LEAD;  Surgeon: ADALID Leon MD;  Location: [facility] EP LAB;  Service: Cardiology;  Laterality: Left;  INFECTION, LEAD EXTRACTION, GUILLERMO, BSCI, ANES, EH,   *NO CTS BACKUP*    IMPLANTATION OF RIGHT VENTRICULAR ASSIST DEVICE (RVAD) N/A 6/29/2022    Procedure: INSERTION, RVAD;  Surgeon: Luis F Paige MD;  Location: [facility] OR [address] [place] [address];  Service: Cardiovascular;  Laterality: N/A;    INSERTION OF GRAFT TO PERICARDIUM Right 6/30/2022    Procedure: INSERTION-RIGHT VENTRICULAR  ASSIST DEVICE;  Surgeon: Luis F Paige MD;  [facility];  Service: Cardiovascular;  Laterality: Right;    IRRIGATION OF MEDIASTINUM  6/30/2022    Procedure: IRRIGATION, MEDIASTINUM;  Surgeon: Luis F Paige MD;  [facility];  Service: Cardiovascular;;    LEFT VENTRICULAR ASSIST DEVICE Left 6/23/2022    Procedure: INSERTION-LEFT VENTRICULAR ASSIST DEVICE;  Surgeon: Luis F Paige MD;  [facility];  Service: Cardiovascular;  Laterality: Left;    LEFT VENTRICULAR ASSIST DEVICE N/A 6/29/2022    Procedure: INSERTION-LEFT VENTRICULAR ASSIST DEVICE;  Surgeon: Luis F Paige MD;  [facility];  Service: Cardiovascular;  Laterality: N/A;    REVISION OF SKIN POCKET FOR CARDIOVERTER-DEFIBRILLATOR  11/9/2023    Procedure: REVISION, SKIN POCKET, FOR CARDIOVERTER-DEFIBRILLATOR;  Surgeon: ADALID Leon MD;  Location: [facility] EP LAB;  Service: Cardiology;;    RIGHT HEART CATHETERIZATION Right 4/8/2022    Procedure: INSERTION, CATHETER, RIGHT HEART;  Surgeon: Luca Lopez Jr., MD;  Location: [facility] CATH LAB;  Service: Cardiology;  Laterality: Right;    RIGHT HEART CATHETERIZATION Right 4/19/2022    Procedure: INSERTION, CATHETER, RIGHT HEART;  Surgeon: Josh Pulido MD;  Location: [facility] CATH LAB;  Service: Cardiology;  Laterality: Right;    RIGHT HEART CATHETERIZATION Right 7/21/2022    Procedure: INSERTION, CATHETER, RIGHT HEART;  Surgeon: Dalia Crum MD;  Location: [facility] CATH LAB;  Service: Cardiology;  Laterality: Right;    RIGHT HEART CATHETERIZATION Right 10/31/2022    Procedure: INSERTION, CATHETER, RIGHT HEART;  Surgeon: Dalia Crum MD;  Location: [facility] CATH LAB;  Service: Cardiology;  Laterality: Right;    STERNAL WOUND CLOSURE N/A 6/30/2022    Procedure: CLOSURE, WOUND, STERNUM;  Surgeon: Luis F Paige MD;  [facility];  Service: Cardiovascular;  Laterality: N/A;       Review of patient's allergies indicates:   Allergen Reactions     Torsemide Hives       Medications:  Continuous Infusions:  Scheduled Meds:   alteplase  2 mg Intra-Catheter Once    amiodarone  200 mg Oral Daily    amLODIPine  5 mg Oral Daily    aspirin  81 mg Oral Daily    atorvastatin  40 mg Oral Daily    ceFAZolin (ANCEF) 6 g in D5W 500 mL CONTINUOUS INFUSION  6 g Intravenous Q24H    DULoxetine  30 mg Oral Daily    eplerenone  25 mg Oral Daily    fluconazole  400 mg Oral Daily    furosemide (LASIX) injection  80 mg Intravenous TID    gabapentin  100 mg Oral Daily    And    gabapentin  400 mg Oral QHS    hydrocortisone  25 mg Rectal BID    insulin aspart U-100  7 Units Subcutaneous TIDWM    insulin glargine U-100  21 Units Subcutaneous Daily    levETIRAcetam  1,500 mg Oral BID    magnesium oxide  400 mg Oral Daily    magnesium sulfate IVPB  2 g Intravenous Once    pantoprazole  40 mg Oral Daily    potassium chloride  30 mEq Oral Daily    warfarin  1.5 mg Oral Daily     PRN Meds:  Current Facility-Administered Medications:     acetaminophen, 650 mg, Oral, Q6H PRN    cyclobenzaprine, 5 mg, Oral, Daily PRN    dextrose 10%, 12.5 g, Intravenous, PRN    dextrose 10%, 25 g, Intravenous, PRN    glucagon (human recombinant), 1 mg, Intramuscular, PRN    glucose, 16 g, Oral, PRN    glucose, 24 g, Oral, PRN    insulin aspart U-100, 0-10 Units, Subcutaneous, QID (AC + HS) PRN    ondansetron, 4 mg, Oral, Q8H PRN    Family History       Problem Relation (Age of Onset)    Diverticulosis Brother    Heart attack Maternal Grandmother, Maternal Grandfather    Heart failure Father          Tobacco Use    Smoking status: Former     Current packs/day: 0.00     Average packs/day: 0.5 packs/day for 16.6 years (8.3 ttl pk-yrs)     Types: Cigarettes     Start date: 10/1/2004     Quit date: 4/23/2021     Years since quitting: 3.4    Smokeless tobacco: Never   Substance and Sexual Activity    Alcohol use: Not Currently    Drug use: Not Currently     Types: Marijuana, MDMA (Ecstacy)    Sexual activity: Yes      Partners: Female     Birth control/protection: None       Review of Systems   Constitutional:  Positive for activity change and fatigue.   Respiratory: Negative.     Cardiovascular: Negative.    Musculoskeletal:  Positive for back pain.   Neurological:  Positive for weakness.     Objective:     Vital Signs (Most Recent):  Temp: 98 °F (36.7 °C) (09/24/24 0455)  Pulse: 82 (09/24/24 0323)  Resp: 18 (09/24/24 0455)  BP: (!) 80/0 (09/24/24 0400)  SpO2: 100 % (09/24/24 0455) Vital Signs (24h Range):  Temp:  [97.2 °F (36.2 °C)-98.5 °F (36.9 °C)] 98 °F (36.7 °C)  Pulse:  [76-97] 82  Resp:  [18-19] 18  SpO2:  [98 %-100 %] 100 %  BP: (80-95)/(0-66) 80/0     Weight: 74.5 kg (164 lb 3.9 oz)  Body mass index is 20.53 kg/m².       Physical Exam       Review of Symptoms      Symptom Assessment (ESAS 0-10 Scale)  Pain:  6  Dyspnea:  0  Anxiety:  0  Nausea:  0  Depression:  0  Anorexia:  0  Fatigue:  0  Insomnia:  0  Restlessness:  0  Agitation:  0         Pain Assessment:  OME in 24 hours:  0  Location(s): back    Back       Location: generalized        Quality: Aching and dull        Quantity: 9/10 in intensity        Chronicity: Onset 10 minute(s) ago, gradually worsening        Aggravating Factors: Activity and standing        Alleviating Factors: Recumbency       Associated Symptoms: None    Performance Status:  60    Living Arrangements:  Lives with family    Psychosocial/Cultural:   See Palliative Psychosocial Note: Yes  **Primary  to Follow**  Palliative Care  Consult: No        Advance Care Planning   Advance Directives:   Living Will: No    LaPOST: No    Do Not Resuscitate Status: No    Medical Power of : Yes    Goals of Care: What is most important right now is to focus on symptom/pain control, extending life as long as possible, even it it means sacrificing quality. Accordingly, we have decided that the best plan to meet the patient's goals includes continuing with treatment.          Significant Labs: All pertinent labs within the past 24 hours have been reviewed.  CBC:   Recent Labs   Lab 09/24/24 0313   WBC 8.56   HGB 7.0*   HCT 25.2*   MCV 68*        BMP:  Recent Labs   Lab 09/24/24 0312   *   *   K 3.1*   CL 95   CO2 30*   BUN 13   CREATININE 0.9   CALCIUM 8.6*   MG 1.7     LFT:  Lab Results   Component Value Date    AST 40 (H) 09/23/2024    ALKPHOS 264 (H) 09/23/2024    BILITOT 1.3 09/23/2024     Albumin:   Albumin   Date Value Ref Range Status   09/23/2024 2.5 (L) 3.5 - 5.0 g/dL Final   09/09/2024 2.7 (L) 3.5 - 5.2 g/dL Final     Protein:   Total Protein   Date Value Ref Range Status   09/09/2024 9.2 (H) 6.0 - 8.4 g/dL Final     Lactic acid:   Lab Results   Component Value Date    LACTATE 1.5 01/23/2024    LACTATE 3.6 (HH) 01/23/2024       Significant Imaging: I have reviewed all pertinent imaging results/findings within the past 24 hours.      CT Abdomen-Pelvis 9/23/24  Impression:  1. There is extensive stranding of the subcutaneous fat as well as the intraabdominal fat associated with small volume ascites suggesting an element of anasarca edema possibly due to congestion.  2. Mild intraperitoneal free fluid is seen in the perihepatic, parasplenic, right paracolic and pelvic recesses.  3. There is pronounced distension of the colon filled with gas and a large amount of stool consistent with constipation. A large amount of stool is seen in the severely distended rectum associated with rectal wall thickening and mucosal enhancement. This suggests proctitis possibly due to fecal impaction. Correlate clinically as regards further evaluation and follow up.  4. The liver appears prominent with heterogeneous parenchyma attenuation and with a somewhat lobulated contour. This suggests hepatocellular disease possibly due to passive congestion. Correlate clinically as regards further evaluation and follow up.  5. No significant intraluminal hyperdense collection, contrast  blush, contrast pooling or contrast extravasation is seen in the bowel to suggest acute gastrointestinal hemorrhage at the time of examination.  6. There are prominent areas of severely diminished parenchymal enhancement in the right kidney as seen on series 10, image 77 and subtle areas of diminished cortical enhancement in the left kidney as noted on series 10 image 69. These findings suggest acute pyelonephritis with ischemic change not entirely excluded.  Correlate clinically as regards further evaluation and follow up  7. Details and other findings as discussed above.

## 2024-09-24 NOTE — CARE UPDATE
-Glucose Goal 140-180    -A1C:   Hemoglobin A1C   Date Value Ref Range Status   08/25/2024 10.3 (H) 4.0 - 5.6 % Final     Comment:     ADA Screening Guidelines:  5.7-6.4%  Consistent with prediabetes  >or=6.5%  Consistent with diabetes    High levels of fetal hemoglobin interfere with the HbA1C  assay. Heterozygous hemoglobin variants (HbS, HgC, etc)do  not significantly interfere with this assay.   However, presence of multiple variants may affect accuracy.           -HOME REGIMEN:   Levemir 18 units twice daily and Novolog 10 units SSI (150/25) per patient  Last recs from recent admit,.   Lantus 12 units twice daily  - Novolog 8 units TID with meals  Add correction scale if needed.  Blood sugar 150 to 200 add 2 units  Blood sugar 201 to 250 add 4 units  Blood sugar 251 to 300 add 6 units  Blood sugar 301 to 350 add 8 units  Blood sugar greater than 350 add 10 units    -GLUCOSE TREND FOR THE PAST 24HRS:   Recent Labs   Lab 09/23/24  0558 09/23/24  1606 09/23/24  2045 09/24/24  0653   POCTGLUCOSE 251* 334* 263* 260*         -NO HYPOGYCEMIAS NOTED     - Diet  Diet NPO  Diet Clear liquid (no sugar)/Bariatric    T2DM.  History of LVAD.  BG above goal.  Diet switch to clear liquids.  To be NPO tonight.  Switch to sugar free clear and hold mealtime insulin    - continue Lantus 21 units daily,   - hold Novolog 7 units while NPO  - continue Novolog /25  - POCT Glucose before meals and at bedtime  - Hypoglycemia protocol in place      ** Please notify Endocrine for any change and/or advance in diet**  ** Please call Endocrine for any BG related issues **     Discharge Planning:   TBD. Please notify endocrinology prior to discharge.

## 2024-09-24 NOTE — PT/OT/SLP PROGRESS
Physical Therapy      Patient Name:  Kevan Queen   MRN:  [id number]    Patient not seen today. Pt with an active GI bleed with down trending Hgb. PT planned for colonoscopy and EGD tomorrow. Will follow-up when active GI bleed is addressed.

## 2024-09-24 NOTE — PROGRESS NOTES
09/24/2024  John Alaniz    Current provider:  Dalia Crum MD    Device interrogation:      9/24/2024    12:00 PM 9/24/2024     7:50 AM 9/24/2024     1:00 AM 9/23/2024     8:11 PM 9/23/2024     4:19 PM 9/23/2024    12:49 PM 9/23/2024    10:26 AM   TXP LVAD INTERROGATIONS   Type HeartMate3 HeartMate3 HeartMate3 HeartMate3 HeartMate3 HeartMate3 HeartMate3   Flow 3.9 4 4 4 4 3.8 4.3   Speed 5100 5100 5100 5100 5100 5100 5100   PI 6.5 5.6 5.9 6.3 5.7 5.8 4.5   Power (Serna) 3.5 3.5 3.6 3.6 3.6 3.6 3.6   LSL 4700 4700 4700 4700 4700 4700 4700   Pulsatility No Pulse No Pulse No Pulse No Pulse No Pulse No Pulse No Pulse          Rounded on Kevan Queen to ensure all mechanical assist device settings (IABP or VAD) were appropriate and all parameters were within limits.  I was able to ensure all back up equipment was present, the staff had no issues, and the Perfusion Department daily rounding was complete.      For implantable VADs: Interrogation of Ventricular assist device was performed with analysis of device parameters and review of device function. I have personally reviewed the interrogation findings and agree with findings as stated.     In emergency, the nursing units have been notified to contact the perfusion department either by:  Calling [phone] from 630am to 4pm Mon thru Fri, utilizing the On-Call Finder functionality of Epic and searching for Perfusion, or by contacting the hospital  from 4pm to 630am and on weekends and asking to speak with the perfusionist on call.    1:03 PM

## 2024-09-24 NOTE — CARE UPDATE
Earlier in the shift contacted by nurse for some blood noted while changing patient.  Picture under media.   CBC ordered a hemoglobin close to previous.  Toward the end of the shift patient had another episode of blood stains, however no bowel movement.   Examined at bedside, patient hemodynamically stable.    -- CBC repeated  -- GI consulted

## 2024-09-24 NOTE — PROGRESS NOTES
Visited patient at bedside. Patient sleeping at the time but woke upon me entering. Patient stated he doesn't need anything. Informed him we will plan to round on him throughout the week, Patient verbalized understanding. VAD numbers WNL.

## 2024-09-24 NOTE — PLAN OF CARE
Problem: Adult Inpatient Plan of Care  Goal: Plan of Care Review  Outcome: Progressing  Goal: Patient-Specific Goal (Individualized)  Outcome: Progressing  Goal: Absence of Hospital-Acquired Illness or Injury  Outcome: Progressing  Goal: Optimal Comfort and Wellbeing  Outcome: Progressing  Goal: Readiness for Transition of Care  Outcome: Progressing     Problem: Diabetes Comorbidity  Goal: Blood Glucose Level Within Targeted Range  Outcome: Progressing     Problem: Sepsis/Septic Shock  Goal: Optimal Coping  Outcome: Progressing  Goal: Absence of Bleeding  Outcome: Progressing  Goal: Blood Glucose Level Within Targeted Range  Outcome: Progressing  Goal: Absence of Infection Signs and Symptoms  Outcome: Progressing  Goal: Optimal Nutrition Intake  Outcome: Progressing     Problem: Infection  Goal: Absence of Infection Signs and Symptoms  Outcome: Progressing     Problem: Coping Ineffective  Goal: Effective Coping  Outcome: Progressing

## 2024-09-25 ENCOUNTER — TELEPHONE (OUTPATIENT)
Dept: TRANSPLANT | Facility: CLINIC | Age: 39
End: 2024-09-25
Payer: MEDICAID

## 2024-09-25 ENCOUNTER — ANESTHESIA (OUTPATIENT)
Dept: ENDOSCOPY | Facility: HOSPITAL | Age: 39
End: 2024-09-25
Payer: MEDICAID

## 2024-09-25 LAB
ALBUMIN SERPL BCP-MCNC: 2.3 G/DL (ref 3.5–5.2)
ALLENS TEST: ABNORMAL
ALP SERPL-CCNC: 230 U/L (ref 55–135)
ALT SERPL W/O P-5'-P-CCNC: 6 U/L (ref 10–44)
ANION GAP SERPL CALC-SCNC: 10 MMOL/L (ref 8–16)
APTT PPP: 35 SEC (ref 21–32)
AST SERPL-CCNC: 28 U/L (ref 10–40)
BASOPHILS # BLD AUTO: 0.04 K/UL (ref 0–0.2)
BASOPHILS # BLD AUTO: 0.06 K/UL (ref 0–0.2)
BASOPHILS NFR BLD: 0.3 % (ref 0–1.9)
BASOPHILS NFR BLD: 0.4 % (ref 0–1.9)
BILIRUB DIRECT SERPL-MCNC: 1.3 MG/DL (ref 0.1–0.3)
BILIRUB SERPL-MCNC: 2 MG/DL (ref 0.1–1)
BLD PROD TYP BPU: NORMAL
BLOOD UNIT EXPIRATION DATE: NORMAL
BLOOD UNIT TYPE CODE: 6200
BLOOD UNIT TYPE: NORMAL
BNP SERPL-MCNC: 289 PG/ML (ref 0–99)
BUN SERPL-MCNC: 10 MG/DL (ref 6–20)
CALCIUM SERPL-MCNC: 8.7 MG/DL (ref 8.7–10.5)
CHLORIDE SERPL-SCNC: 95 MMOL/L (ref 95–110)
CO2 SERPL-SCNC: 31 MMOL/L (ref 23–29)
CODING SYSTEM: NORMAL
CREAT SERPL-MCNC: 0.9 MG/DL (ref 0.5–1.4)
CROSSMATCH INTERPRETATION: NORMAL
CRP SERPL-MCNC: 17 MG/L (ref 0–8.2)
DIFFERENTIAL METHOD BLD: ABNORMAL
DIFFERENTIAL METHOD BLD: ABNORMAL
DISPENSE STATUS: NORMAL
EOSINOPHIL # BLD AUTO: 0.1 K/UL (ref 0–0.5)
EOSINOPHIL # BLD AUTO: 0.1 K/UL (ref 0–0.5)
EOSINOPHIL NFR BLD: 0.4 % (ref 0–8)
EOSINOPHIL NFR BLD: 1 % (ref 0–8)
ERYTHROCYTE [DISTWIDTH] IN BLOOD BY AUTOMATED COUNT: 25.4 % (ref 11.5–14.5)
ERYTHROCYTE [DISTWIDTH] IN BLOOD BY AUTOMATED COUNT: 26.9 % (ref 11.5–14.5)
EST. GFR  (NO RACE VARIABLE): >60 ML/MIN/1.73 M^2
GLUCOSE SERPL-MCNC: 139 MG/DL (ref 70–110)
HCO3 UR-SCNC: 36.5 MMOL/L (ref 24–28)
HCT VFR BLD AUTO: 23.1 % (ref 40–54)
HCT VFR BLD AUTO: 27.3 % (ref 40–54)
HCT VFR BLD CALC: 27 %PCV (ref 36–54)
HGB BLD-MCNC: 6.9 G/DL (ref 14–18)
HGB BLD-MCNC: 8.1 G/DL (ref 14–18)
IMM GRANULOCYTES # BLD AUTO: 0.02 K/UL (ref 0–0.04)
IMM GRANULOCYTES # BLD AUTO: 0.07 K/UL (ref 0–0.04)
IMM GRANULOCYTES NFR BLD AUTO: 0.2 % (ref 0–0.5)
IMM GRANULOCYTES NFR BLD AUTO: 0.5 % (ref 0–0.5)
INR PPP: 1.9 (ref 0.8–1.2)
LDH SERPL L TO P-CCNC: 255 U/L (ref 110–260)
LYMPHOCYTES # BLD AUTO: 1.5 K/UL (ref 1–4.8)
LYMPHOCYTES # BLD AUTO: 1.6 K/UL (ref 1–4.8)
LYMPHOCYTES NFR BLD: 10.6 % (ref 18–48)
LYMPHOCYTES NFR BLD: 13.3 % (ref 18–48)
MAGNESIUM SERPL-MCNC: 1.6 MG/DL (ref 1.6–2.6)
MCH RBC QN AUTO: 20.6 PG (ref 27–31)
MCH RBC QN AUTO: 21.4 PG (ref 27–31)
MCHC RBC AUTO-ENTMCNC: 29.7 G/DL (ref 32–36)
MCHC RBC AUTO-ENTMCNC: 29.9 G/DL (ref 32–36)
MCV RBC AUTO: 69 FL (ref 82–98)
MCV RBC AUTO: 72 FL (ref 82–98)
MONOCYTES # BLD AUTO: 0.6 K/UL (ref 0.3–1)
MONOCYTES # BLD AUTO: 0.8 K/UL (ref 0.3–1)
MONOCYTES NFR BLD: 5.3 % (ref 4–15)
MONOCYTES NFR BLD: 5.6 % (ref 4–15)
NEUTROPHILS # BLD AUTO: 12.4 K/UL (ref 1.8–7.7)
NEUTROPHILS # BLD AUTO: 9.1 K/UL (ref 1.8–7.7)
NEUTROPHILS NFR BLD: 79.6 % (ref 38–73)
NEUTROPHILS NFR BLD: 82.8 % (ref 38–73)
NRBC BLD-RTO: 0 /100 WBC
NRBC BLD-RTO: 0 /100 WBC
PCO2 BLDA: 50.6 MMHG (ref 35–45)
PH SMN: 7.47 [PH] (ref 7.35–7.45)
PHOSPHATE SERPL-MCNC: 2.7 MG/DL (ref 2.7–4.5)
PLATELET # BLD AUTO: 219 K/UL (ref 150–450)
PLATELET # BLD AUTO: 227 K/UL (ref 150–450)
PMV BLD AUTO: 10.2 FL (ref 9.2–12.9)
PMV BLD AUTO: 10.3 FL (ref 9.2–12.9)
PO2 BLDA: 27 MMHG (ref 40–60)
POC BE: 13 MMOL/L
POC SATURATED O2: 52 % (ref 95–100)
POC TCO2: 38 MMOL/L (ref 24–29)
POCT GLUCOSE: 112 MG/DL (ref 70–110)
POCT GLUCOSE: 212 MG/DL (ref 70–110)
POCT GLUCOSE: 85 MG/DL (ref 70–110)
POCT GLUCOSE: 94 MG/DL (ref 70–110)
POTASSIUM SERPL-SCNC: 3.3 MMOL/L (ref 3.5–5.1)
PREALB SERPL-MCNC: 12 MG/DL (ref 20–43)
PROT SERPL-MCNC: 7.7 G/DL (ref 6–8.4)
PROTHROMBIN TIME: 20.3 SEC (ref 9–12.5)
RBC # BLD AUTO: 3.35 M/UL (ref 4.6–6.2)
RBC # BLD AUTO: 3.79 M/UL (ref 4.6–6.2)
SAMPLE: ABNORMAL
SITE: ABNORMAL
SODIUM SERPL-SCNC: 136 MMOL/L (ref 136–145)
TRANS ERYTHROCYTES VOL PATIENT: NORMAL ML
WBC # BLD AUTO: 11.45 K/UL (ref 3.9–12.7)
WBC # BLD AUTO: 14.96 K/UL (ref 3.9–12.7)

## 2024-09-25 PROCEDURE — 84100 ASSAY OF PHOSPHORUS: CPT | Performed by: PHYSICIAN ASSISTANT

## 2024-09-25 PROCEDURE — 37000009 HC ANESTHESIA EA ADD 15 MINS: Performed by: INTERNAL MEDICINE

## 2024-09-25 PROCEDURE — 43235 EGD DIAGNOSTIC BRUSH WASH: CPT | Mod: 51,,, | Performed by: INTERNAL MEDICINE

## 2024-09-25 PROCEDURE — 63600175 PHARM REV CODE 636 W HCPCS: Performed by: PHYSICIAN ASSISTANT

## 2024-09-25 PROCEDURE — 37000008 HC ANESTHESIA 1ST 15 MINUTES: Performed by: INTERNAL MEDICINE

## 2024-09-25 PROCEDURE — 99233 SBSQ HOSP IP/OBS HIGH 50: CPT | Mod: 95,,, | Performed by: PHYSICIAN ASSISTANT

## 2024-09-25 PROCEDURE — 25000003 PHARM REV CODE 250: Performed by: STUDENT IN AN ORGANIZED HEALTH CARE EDUCATION/TRAINING PROGRAM

## 2024-09-25 PROCEDURE — 86920 COMPATIBILITY TEST SPIN: CPT | Performed by: PHYSICIAN ASSISTANT

## 2024-09-25 PROCEDURE — 25000003 PHARM REV CODE 250: Performed by: INTERNAL MEDICINE

## 2024-09-25 PROCEDURE — 63600175 PHARM REV CODE 636 W HCPCS: Performed by: STUDENT IN AN ORGANIZED HEALTH CARE EDUCATION/TRAINING PROGRAM

## 2024-09-25 PROCEDURE — 27200997: Performed by: INTERNAL MEDICINE

## 2024-09-25 PROCEDURE — 25000003 PHARM REV CODE 250: Performed by: PHYSICIAN ASSISTANT

## 2024-09-25 PROCEDURE — 27000248 HC VAD-ADDITIONAL DAY

## 2024-09-25 PROCEDURE — 45382 COLONOSCOPY W/CONTROL BLEED: CPT | Mod: ,,, | Performed by: INTERNAL MEDICINE

## 2024-09-25 PROCEDURE — 83880 ASSAY OF NATRIURETIC PEPTIDE: CPT | Performed by: PHYSICIAN ASSISTANT

## 2024-09-25 PROCEDURE — 63600175 PHARM REV CODE 636 W HCPCS: Performed by: INTERNAL MEDICINE

## 2024-09-25 PROCEDURE — P9021 RED BLOOD CELLS UNIT: HCPCS | Performed by: PHYSICIAN ASSISTANT

## 2024-09-25 PROCEDURE — 0DJ08ZZ INSPECTION OF UPPER INTESTINAL TRACT, VIA NATURAL OR ARTIFICIAL OPENING ENDOSCOPIC: ICD-10-PCS | Performed by: INTERNAL MEDICINE

## 2024-09-25 PROCEDURE — 43235 EGD DIAGNOSTIC BRUSH WASH: CPT | Performed by: INTERNAL MEDICINE

## 2024-09-25 PROCEDURE — 86140 C-REACTIVE PROTEIN: CPT | Performed by: PHYSICIAN ASSISTANT

## 2024-09-25 PROCEDURE — 20600001 HC STEP DOWN PRIVATE ROOM

## 2024-09-25 PROCEDURE — 99900035 HC TECH TIME PER 15 MIN (STAT)

## 2024-09-25 PROCEDURE — 80048 BASIC METABOLIC PNL TOTAL CA: CPT | Performed by: PHYSICIAN ASSISTANT

## 2024-09-25 PROCEDURE — 85610 PROTHROMBIN TIME: CPT | Performed by: PHYSICIAN ASSISTANT

## 2024-09-25 PROCEDURE — 84134 ASSAY OF PREALBUMIN: CPT | Performed by: PHYSICIAN ASSISTANT

## 2024-09-25 PROCEDURE — 83615 LACTATE (LD) (LDH) ENZYME: CPT | Performed by: PHYSICIAN ASSISTANT

## 2024-09-25 PROCEDURE — 45382 COLONOSCOPY W/CONTROL BLEED: CPT | Performed by: INTERNAL MEDICINE

## 2024-09-25 PROCEDURE — 99232 SBSQ HOSP IP/OBS MODERATE 35: CPT | Mod: ,,, | Performed by: STUDENT IN AN ORGANIZED HEALTH CARE EDUCATION/TRAINING PROGRAM

## 2024-09-25 PROCEDURE — 0W3P8ZZ CONTROL BLEEDING IN GASTROINTESTINAL TRACT, VIA NATURAL OR ARTIFICIAL OPENING ENDOSCOPIC: ICD-10-PCS | Performed by: INTERNAL MEDICINE

## 2024-09-25 PROCEDURE — 93750 INTERROGATION VAD IN PERSON: CPT | Mod: ,,, | Performed by: INTERNAL MEDICINE

## 2024-09-25 PROCEDURE — 63700000 PHARM REV CODE 250 ALT 637 W/O HCPCS: Performed by: PHYSICIAN ASSISTANT

## 2024-09-25 PROCEDURE — 82962 GLUCOSE BLOOD TEST: CPT | Performed by: INTERNAL MEDICINE

## 2024-09-25 PROCEDURE — 85025 COMPLETE CBC W/AUTO DIFF WBC: CPT | Performed by: INTERNAL MEDICINE

## 2024-09-25 PROCEDURE — 83735 ASSAY OF MAGNESIUM: CPT | Performed by: PHYSICIAN ASSISTANT

## 2024-09-25 PROCEDURE — 85730 THROMBOPLASTIN TIME PARTIAL: CPT | Performed by: PHYSICIAN ASSISTANT

## 2024-09-25 PROCEDURE — 80076 HEPATIC FUNCTION PANEL: CPT | Performed by: PHYSICIAN ASSISTANT

## 2024-09-25 RX ORDER — KETAMINE HCL IN 0.9 % NACL 50 MG/5 ML
SYRINGE (ML) INTRAVENOUS
Status: DISCONTINUED | OUTPATIENT
Start: 2024-09-25 | End: 2024-09-25

## 2024-09-25 RX ORDER — MEPERIDINE HYDROCHLORIDE 50 MG/ML
12.5 INJECTION INTRAMUSCULAR; INTRAVENOUS; SUBCUTANEOUS ONCE AS NEEDED
Status: ACTIVE | OUTPATIENT
Start: 2024-09-25 | End: 2024-09-26

## 2024-09-25 RX ORDER — LIDOCAINE HYDROCHLORIDE 20 MG/ML
INJECTION INTRAVENOUS
Status: DISCONTINUED | OUTPATIENT
Start: 2024-09-25 | End: 2024-09-25

## 2024-09-25 RX ORDER — POTASSIUM CHLORIDE 14.9 MG/ML
20 INJECTION INTRAVENOUS ONCE
Status: COMPLETED | OUTPATIENT
Start: 2024-09-25 | End: 2024-09-25

## 2024-09-25 RX ORDER — DEXMEDETOMIDINE HYDROCHLORIDE 100 UG/ML
INJECTION, SOLUTION INTRAVENOUS
Status: DISCONTINUED | OUTPATIENT
Start: 2024-09-25 | End: 2024-09-25

## 2024-09-25 RX ORDER — ONDANSETRON HYDROCHLORIDE 2 MG/ML
4 INJECTION, SOLUTION INTRAVENOUS DAILY PRN
Status: DISCONTINUED | OUTPATIENT
Start: 2024-09-25 | End: 2024-09-29 | Stop reason: HOSPADM

## 2024-09-25 RX ORDER — SODIUM CHLORIDE 9 MG/ML
INJECTION, SOLUTION INTRAVENOUS CONTINUOUS
Status: DISCONTINUED | OUTPATIENT
Start: 2024-09-25 | End: 2024-09-29 | Stop reason: HOSPADM

## 2024-09-25 RX ORDER — MAGNESIUM SULFATE HEPTAHYDRATE 40 MG/ML
2 INJECTION, SOLUTION INTRAVENOUS ONCE
Status: COMPLETED | OUTPATIENT
Start: 2024-09-25 | End: 2024-09-25

## 2024-09-25 RX ORDER — MIDAZOLAM HYDROCHLORIDE 1 MG/ML
INJECTION INTRAMUSCULAR; INTRAVENOUS
Status: DISCONTINUED | OUTPATIENT
Start: 2024-09-25 | End: 2024-09-25

## 2024-09-25 RX ORDER — LIDOCAINE HYDROCHLORIDE 20 MG/ML
SOLUTION OROPHARYNGEAL
Status: DISCONTINUED | OUTPATIENT
Start: 2024-09-25 | End: 2024-09-25

## 2024-09-25 RX ORDER — GLUCAGON 1 MG
1 KIT INJECTION
Status: DISCONTINUED | OUTPATIENT
Start: 2024-09-25 | End: 2024-09-29 | Stop reason: HOSPADM

## 2024-09-25 RX ORDER — HYDROCODONE BITARTRATE AND ACETAMINOPHEN 500; 5 MG/1; MG/1
TABLET ORAL
Status: DISCONTINUED | OUTPATIENT
Start: 2024-09-25 | End: 2024-09-29 | Stop reason: HOSPADM

## 2024-09-25 RX ORDER — HYDROMORPHONE HYDROCHLORIDE 1 MG/ML
0.2 INJECTION, SOLUTION INTRAMUSCULAR; INTRAVENOUS; SUBCUTANEOUS EVERY 5 MIN PRN
Status: DISCONTINUED | OUTPATIENT
Start: 2024-09-25 | End: 2024-09-29 | Stop reason: HOSPADM

## 2024-09-25 RX ORDER — ONDANSETRON HYDROCHLORIDE 2 MG/ML
INJECTION, SOLUTION INTRAVENOUS
Status: DISCONTINUED | OUTPATIENT
Start: 2024-09-25 | End: 2024-09-25

## 2024-09-25 RX ORDER — INSULIN ASPART 100 [IU]/ML
9 INJECTION, SOLUTION INTRAVENOUS; SUBCUTANEOUS
Status: DISCONTINUED | OUTPATIENT
Start: 2024-09-25 | End: 2024-09-26

## 2024-09-25 RX ORDER — ETOMIDATE 2 MG/ML
INJECTION INTRAVENOUS
Status: DISCONTINUED | OUTPATIENT
Start: 2024-09-25 | End: 2024-09-25

## 2024-09-25 RX ADMIN — HYDROCORTISONE ACETATE 25 MG: 25 SUPPOSITORY RECTAL at 11:09

## 2024-09-25 RX ADMIN — FUROSEMIDE 80 MG: 10 INJECTION, SOLUTION INTRAVENOUS at 11:09

## 2024-09-25 RX ADMIN — DULOXETINE HYDROCHLORIDE 30 MG: 30 CAPSULE, DELAYED RELEASE ORAL at 09:09

## 2024-09-25 RX ADMIN — GABAPENTIN 100 MG: 100 CAPSULE ORAL at 09:09

## 2024-09-25 RX ADMIN — GABAPENTIN 400 MG: 400 CAPSULE ORAL at 09:09

## 2024-09-25 RX ADMIN — ETOMIDATE 2 MG: 2 INJECTION, SOLUTION INTRAVENOUS at 03:09

## 2024-09-25 RX ADMIN — Medication 30 MG: at 02:09

## 2024-09-25 RX ADMIN — EPLERENONE 25 MG: 25 TABLET, FILM COATED ORAL at 09:09

## 2024-09-25 RX ADMIN — FUROSEMIDE 80 MG: 10 INJECTION, SOLUTION INTRAVENOUS at 09:09

## 2024-09-25 RX ADMIN — DEXMEDETOMIDINE 12 MCG: 100 INJECTION, SOLUTION, CONCENTRATE INTRAVENOUS at 02:09

## 2024-09-25 RX ADMIN — MIDAZOLAM HYDROCHLORIDE 2 MG: 2 INJECTION, SOLUTION INTRAMUSCULAR; INTRAVENOUS at 02:09

## 2024-09-25 RX ADMIN — ETOMIDATE 6 MG: 2 INJECTION, SOLUTION INTRAVENOUS at 02:09

## 2024-09-25 RX ADMIN — FLUCONAZOLE 400 MG: 100 TABLET ORAL at 11:09

## 2024-09-25 RX ADMIN — ETOMIDATE 8 MG: 2 INJECTION, SOLUTION INTRAVENOUS at 02:09

## 2024-09-25 RX ADMIN — INSULIN GLARGINE 15 UNITS: 100 INJECTION, SOLUTION SUBCUTANEOUS at 09:09

## 2024-09-25 RX ADMIN — Medication 400 MG: at 09:09

## 2024-09-25 RX ADMIN — ATORVASTATIN CALCIUM 40 MG: 20 TABLET, FILM COATED ORAL at 09:09

## 2024-09-25 RX ADMIN — LEVETIRACETAM 1500 MG: 500 TABLET, FILM COATED ORAL at 09:09

## 2024-09-25 RX ADMIN — ONDANSETRON 4 MG: 2 INJECTION INTRAMUSCULAR; INTRAVENOUS at 02:09

## 2024-09-25 RX ADMIN — HYDROCORTISONE ACETATE 25 MG: 25 SUPPOSITORY RECTAL at 09:09

## 2024-09-25 RX ADMIN — ETOMIDATE 4 MG: 2 INJECTION, SOLUTION INTRAVENOUS at 03:09

## 2024-09-25 RX ADMIN — INSULIN ASPART 2 UNITS: 100 INJECTION, SOLUTION INTRAVENOUS; SUBCUTANEOUS at 09:09

## 2024-09-25 RX ADMIN — GLYCOPYRROLATE 0.4 MG: 0.2 INJECTION, SOLUTION INTRAMUSCULAR; INTRAVENOUS at 02:09

## 2024-09-25 RX ADMIN — POTASSIUM CHLORIDE 30 MEQ: 750 CAPSULE, EXTENDED RELEASE ORAL at 09:09

## 2024-09-25 RX ADMIN — CEFAZOLIN 6 G: 10 INJECTION, POWDER, FOR SOLUTION INTRAVENOUS at 09:09

## 2024-09-25 RX ADMIN — PANTOPRAZOLE SODIUM 40 MG: 40 TABLET, DELAYED RELEASE ORAL at 09:09

## 2024-09-25 RX ADMIN — AMIODARONE HYDROCHLORIDE 200 MG: 200 TABLET ORAL at 09:09

## 2024-09-25 RX ADMIN — LIDOCAINE HYDROCHLORIDE 100 MG: 20 INJECTION INTRAVENOUS at 02:09

## 2024-09-25 RX ADMIN — AMLODIPINE BESYLATE 5 MG: 5 TABLET ORAL at 09:09

## 2024-09-25 RX ADMIN — LIDOCAINE HYDROCHLORIDE 15 ML: 20 SOLUTION OROPHARYNGEAL at 02:09

## 2024-09-25 RX ADMIN — ETOMIDATE 4 MG: 2 INJECTION, SOLUTION INTRAVENOUS at 02:09

## 2024-09-25 RX ADMIN — MAGNESIUM SULFATE HEPTAHYDRATE 2 G: 40 INJECTION, SOLUTION INTRAVENOUS at 09:09

## 2024-09-25 RX ADMIN — POTASSIUM CHLORIDE 20 MEQ: 200 INJECTION, SOLUTION INTRAVENOUS at 09:09

## 2024-09-25 RX ADMIN — Medication 10 MG: at 02:09

## 2024-09-25 RX ADMIN — SODIUM CHLORIDE: 9 INJECTION, SOLUTION INTRAVENOUS at 02:09

## 2024-09-25 NOTE — PROVATION PATIENT INSTRUCTIONS
Discharge Summary/Instructions after an Endoscopic Procedure  Patient Name: Kevan Queen  Patient MRN: [id number]  Patient YOB: 1985 Wednesday, September 25, 2024  Drake Barton MD  Dear patient,  As a result of recent federal legislation (The Federal Cures Act), you may   receive lab or pathology results from your procedure in your [facility]   account before your physician is able to contact you. Your physician or   their representative will relay the results to you with their   recommendations at their soonest availability.  Thank you,  RESTRICTIONS:  During your procedure today, you received medications for sedation.  These   medications may affect your judgment, balance and coordination.  Therefore,   for 24 hours, you have the following restrictions:   - DO NOT drive a car, operate machinery, make legal/financial decisions,   sign important papers or drink alcohol.    ACTIVITY:  Today: no heavy lifting, straining or running due to procedural   sedation/anesthesia.  The following day: return to full activity including work.  DIET:  Eat and drink normally unless instructed otherwise.     TREATMENT FOR COMMON SIDE EFFECTS:  - Mild abdominal pain, nausea, belching, bloating or excessive gas:  rest,   eat lightly and use a heating pad.  - Sore Throat: treat with throat lozenges and/or gargle with warm salt   water.  - Because air was used during the procedure, expelling large amounts of air   from your rectum or belching is normal.  - If a bowel prep was taken, you may not have a bowel movement for 1-3 days.    This is normal.  SYMPTOMS TO WATCH FOR AND REPORT TO YOUR PHYSICIAN:  1. Abdominal pain or bloating, other than gas cramps.  2. Chest pain.  3. Back pain.  4. Signs of infection such as: chills or fever occurring within 24 hours   after the procedure.  5. Rectal bleeding, which would show as bright red, maroon, or black stools.   (A tablespoon of blood from the rectum is not serious, especially  if   hemorrhoids are present.)  6. Vomiting.  7. Weakness or dizziness.  GO DIRECTLY TO THE NEAREST EMERGENCY ROOM IF YOU HAVE ANY OF THE FOLLOWING:      Difficulty breathing              Chills and/or fever over 101 F   Persistent vomiting and/or vomiting blood   Severe abdominal pain   Severe chest pain   Black, tarry stools   Bleeding- more than one tablespoon   Any other symptom or condition that you feel may need urgent attention  Your doctor recommends these additional instructions:  If any biopsies were taken, your doctors clinic will contact you in 1 to 2   weeks with any results.  - Return patient to [facility] for ongoing care.   - Advance diet as tolerated.   - Continue present medications.   - Repeat colonoscopy at age 45 for screening purposes.   - If he has ongoing rectal bleeding, would repeat colonoscopy for additional   treatment.  For questions, problems or results please call your physician - Drake Barton MD at Work:  [phone].  [facility] [place], EMERGENCY ROOM PHONE NUMBER: [phone]  IF A COMPLICATION OR EMERGENCY SITUATION ARISES AND YOU ARE UNABLE TO REACH   YOUR PHYSICIAN - GO DIRECTLY TO THE EMERGENCY ROOM.  Drake Barton MD  9/25/2024 4:32:36 PM  This report has been verified and signed electronically.  Dear patient,  As a result of recent federal legislation (The Federal Cures Act), you may   receive lab or pathology results from your procedure in your [facility]   account before your physician is able to contact you. Your physician or   their representative will relay the results to you with their   recommendations at their soonest availability.  Thank you,  PROVATION

## 2024-09-25 NOTE — PROGRESS NOTES
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 CT noted with constipation and R pyelonephritis. Plan for EGD/cscope today. No leukocytosis noted on lab work today.     Recommendations:  -continue cefazolin 6g continuous infusion iv daily, maria isabel 10/9 for 6w course  -continue fluconazole 400 mg daily, maria isabel 10/19 for 6w course   -monitor QTC and DDI  -follow up EGD/cscope              Thank you for your consult. I will follow-up with patient. Please contact us if you have any additional questions.    Laura Baldwin MD  Infectious Disease  [facility] - Cardiology Stepdown    Subjective:     Principal Problem:<principal problem not specified>    HPI: 40 yo male with HM3 as DT c/b recurrent DLESI with recent admission for MSSA DLESI/bacteremia and Cparapsilosis fungemia (maintained on prolong course of cefazolin/fluc) admitted for BRBPR. Pt reported having constipation prior to admission with associated straining with subsequent BRPBR. He denied fevers, chills, issues with PICC or IV abx. Recently restarted taking fluconazole after prescription was re-sent. Pt is currently on cefazolin and fluconazole. Plan for cscope tomorrow.    Interval History: No fevers documented overnight.   Pending cscope today. Pt reports feeling better, tolerating antimicrobials without issues.     Review  of Systems   Constitutional:  Negative for chills and fever.   Gastrointestinal:  Positive for blood in stool.   All other systems reviewed and are negative.    Objective:     Vital Signs (Most Recent):  Temp: 98.3 °F (36.8 °C) (09/25/24 0728)  Pulse: 82 (09/25/24 0728)  Resp: 18 (09/25/24 0728)  BP: (!) 86/0 (09/25/24 0415)  SpO2: 99 % (09/25/24 0728) Vital Signs (24h Range):  Temp:  [97.2 °F (36.2 °C)-98.5 °F (36.9 °C)] 98.3 °F (36.8 °C)  Pulse:  [73-87] 82  Resp:  [18-20] 18  SpO2:  [91 %-99 %] 99 %  BP: (74-90)/(0) 86/0     Weight: 74.5 kg (164 lb 3.9 oz)  Body mass index is 20.53 kg/m².    Estimated Creatinine Clearance: 116.1 mL/min (based on SCr of 0.9 mg/dL).     Physical Exam  Constitutional:       General: He is not in acute distress.     Appearance: He is not ill-appearing or toxic-appearing.   Eyes:      General:         Right eye: No discharge.         Left eye: No discharge.   Pulmonary:      Effort: Pulmonary effort is normal. No respiratory distress.   Abdominal:      Comments: LVAD dressed   Musculoskeletal:      Right lower leg: No edema.      Left lower leg: No edema.   Skin:     General: Skin is warm and dry.      Comments: R [facility] picc   Neurological:      Mental Status: He is alert and oriented to person, place, and time.          Significant Labs:   Microbiology Results (last 7 days)       ** No results found for the last 168 hours. **            Significant Imaging: I have reviewed all pertinent imaging results/findings within the past 24 hours.

## 2024-09-25 NOTE — PT/OT/SLP PROGRESS
Occupational Therapy      Patient Name:  Kevan Queen   MRN:  [id number]    Patient not seen today. Pt with decreased Hbg values this AM and colonoscopy procedure this PM. Will follow-up tomorrow.    9/25/2024

## 2024-09-25 NOTE — ASSESSMENT & PLAN NOTE
-Rectal bleeding x 1 PTA.  External hemorrhoid noted by mother that she then reduced   -Multiple bloody BM during admission   -H/H trending down , given 1 unit PRBC 9/24, will give additional unit this morning   -GI consulted, planning for colonoscopy today

## 2024-09-25 NOTE — TELEPHONE ENCOUNTER
"9/23/24 00:05: received page from patient's fianceRosage, stating that "Kevan is profusely bleeding from his rectum; his mom called 911." Asked Robyn if she has any other information, reports that throughout the week, stool did have some blood in it but per mom, it is now straight blood. Kevan isn't feeling that well either. No alarms on LVAD. Patient will go to [facility]. Informed Dr. Marques via phone.     06:00- transfer center called to report that patient is being transferred for GIB and has his VAD emergency bag with all equipment.   "

## 2024-09-25 NOTE — TRANSFER OF CARE
"Anesthesia Transfer of Care Note    Patient: Kevan Queen    Procedure(s) Performed: Procedure(s) (LRB):  COLONOSCOPY (N/A)  EGD (ESOPHAGOGASTRODUODENOSCOPY) (N/A)    Patient location: PACU    Anesthesia Type: general    Transport from OR: Transported from OR on 6-10 L/min O2 by face mask with adequate spontaneous ventilation    Post pain: adequate analgesia    Post assessment: no apparent anesthetic complications    Post vital signs: stable    Level of consciousness: awake    Nausea/Vomiting: no nausea/vomiting    Complications: none    Transfer of care protocol was followed      Last vitals: Visit Vitals  BP (!) 82/0 (BP Location: Left arm, Patient Position: Lying)   Pulse 80   Temp 36.7 °C (98.1 °F) (Temporal)   Resp 16   Ht 6' 3" (1.905 m)   Wt 74.5 kg (164 lb 3.9 oz)   SpO2 97%   BMI 20.53 kg/m²     "

## 2024-09-25 NOTE — PROGRESS NOTES
[facility] - Cardiology Stepdown  Heart Transplant  Progress Note    Patient Name: Kevan Queen  MRN: [id number]  Admission Date: 9/23/2024  Hospital Length of Stay: 2 days  Attending Physician: Dalia Crum MD  Primary Care Provider: Rosio Armendariz FNP  Principal Problem:<principal problem not specified>    Subjective:   Interval History: Continues to have large bloody BMs overnight (4 total). Given 1 unit pRBC yesterday. Hgb 6.9 this morning. Will give 1 unit PRBC prior to colonoscopy today. Coumadin, ASA remain on hold.     Continuous Infusions:  Scheduled Meds:   alteplase  2 mg Intra-Catheter Once    amiodarone  200 mg Oral Daily    amLODIPine  5 mg Oral Daily    atorvastatin  40 mg Oral Daily    ceFAZolin (ANCEF) 6 g in D5W 500 mL CONTINUOUS INFUSION  6 g Intravenous Q24H    DULoxetine  30 mg Oral Daily    eplerenone  25 mg Oral Daily    fluconazole  400 mg Oral Daily    furosemide (LASIX) injection  80 mg Intravenous TID    gabapentin  100 mg Oral Daily    And    gabapentin  400 mg Oral QHS    hydrocortisone  25 mg Rectal BID    insulin aspart U-100  9 Units Subcutaneous TIDWM    insulin glargine U-100  15 Units Subcutaneous BID    levETIRAcetam  1,500 mg Oral BID    magnesium oxide  400 mg Oral Daily    magnesium sulfate IVPB  2 g Intravenous Once    pantoprazole  40 mg Oral Daily    potassium chloride  30 mEq Oral Daily     PRN Meds:  Current Facility-Administered Medications:     0.9%  NaCl infusion (for blood administration), , Intravenous, Q24H PRN    0.9%  NaCl infusion (for blood administration), , Intravenous, Q24H PRN    acetaminophen, 650 mg, Oral, Q6H PRN    cyclobenzaprine, 5 mg, Oral, Daily PRN    dextrose 10%, 12.5 g, Intravenous, PRN    dextrose 10%, 25 g, Intravenous, PRN    glucagon (human recombinant), 1 mg, Intramuscular, PRN    glucose, 16 g, Oral, PRN    glucose, 24 g, Oral, PRN    insulin aspart U-100, 0-10 Units, Subcutaneous, QID (AC + HS) PRN    ondansetron, 4 mg, Oral,  Q8H PRN    Review of patient's allergies indicates:   Allergen Reactions    Torsemide Hives     Objective:     Vital Signs (Most Recent):  Temp: 98.3 °F (36.8 °C) (09/25/24 0728)  Pulse: 82 (09/25/24 0728)  Resp: 18 (09/25/24 0728)  BP: (!) 82/0 (09/25/24 0728)  SpO2: 99 % (09/25/24 0728) Vital Signs (24h Range):  Temp:  [97.2 °F (36.2 °C)-98.5 °F (36.9 °C)] 98.3 °F (36.8 °C)  Pulse:  [73-87] 82  Resp:  [18-20] 18  SpO2:  [91 %-99 %] 99 %  BP: (74-90)/(0) 82/0     Patient Vitals for the past 72 hrs (Last 3 readings):   Weight   09/23/24 1331 74.5 kg (164 lb 3.9 oz)     Body mass index is 20.53 kg/m².      Intake/Output Summary (Last 24 hours) at 9/25/2024 1005  Last data filed at 9/25/2024 0555  Gross per 24 hour   Intake 4960.83 ml   Output 2320 ml   Net 2640.83 ml       Hemodynamic Parameters:  CVP:  [8 mmHg-13 mmHg] 8 mmHg           Physical Exam  Vitals reviewed.   HENT:      Head: Normocephalic.      Nose: Nose normal.   Eyes:      Conjunctiva/sclera: Conjunctivae normal.   Cardiovascular:      Rate and Rhythm: Normal rate and regular rhythm.      Comments: Smooth VAD hum   Pulmonary:      Effort: Pulmonary effort is normal.   Abdominal:      General: Abdomen is flat.      Tenderness: There is no abdominal tenderness.   Musculoskeletal:         General: Normal range of motion.      Cervical back: Normal range of motion.   Skin:     General: Skin is warm.      Capillary Refill: Capillary refill takes less than 2 seconds.   Neurological:      General: No focal deficit present.      Mental Status: He is alert.   Psychiatric:         Mood and Affect: Mood normal.         Behavior: Behavior normal.         Thought Content: Thought content normal.         Judgment: Judgment normal.            Significant Labs:  CBC:  Recent Labs   Lab 09/24/24  1318 09/24/24  1825 09/25/24  0420 09/25/24  0630   WBC 9.18 8.13  --  11.45   RBC 3.48* 3.33*  --  3.35*   HGB 6.8* 6.6*  --  6.9*   HCT 24.1* 22.8* 27* 23.1*    253   --  219   MCV 69* 69*  --  69*   MCH 19.5* 19.8*  --  20.6*   MCHC 28.2* 28.9*  --  29.9*     BNP:  Recent Labs   Lab 09/25/24  0359   *     CMP:  Recent Labs   Lab 09/23/24  0205 09/24/24 0312 09/25/24  0359   GLU  --  256* 139*   CALCIUM 8.5 8.6* 8.7   ALBUMIN 2.5*  --  2.3*   PROT  --   --  7.7   * 132* 136   K 3.3* 3.1* 3.3*   CO2 28 30* 31*   CL 93* 95 95   BUN 13.0 13 10   CREATININE 1.33* 0.9 0.9   ALKPHOS 264*  --  230*   ALT 12  --  6*   AST 40*  --  28   BILITOT 1.3  --  2.0*      Coagulation:   Recent Labs   Lab 09/23/24 0205 09/23/24 2316 09/24/24 0313 09/25/24  0359   INR 2.1* 2.0* 2.0* 1.9*   APTT 33.1  --  37.0* 35.0*     LDH:  Recent Labs   Lab 09/24/24 0313 09/25/24  0359   * 255     Microbiology:  Microbiology Results (last 7 days)       ** No results found for the last 168 hours. **            I have reviewed all pertinent labs within the past 24 hours.    Estimated Creatinine Clearance: 116.1 mL/min (based on SCr of 0.9 mg/dL).    Diagnostic Results:  I have reviewed all pertinent imaging results/findings within the past 24 hours.  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  He was weaned off  but he had to restarted due to RVF and transitioned to milrinone (secondary to  shortage). Most recently admitted on 9/10-9/12 for pyelonephritis with Bcx positive for yeast. Had many GOC discussions but he did not want to be DNR/DNI, and wants to continue aggressive care. He was weaned off milrinone but was discharged with PICC for IV antibiotics. Transferred after presenting to the ED last night after one episode of bright red blood  per rectum yesterday evening. Pt states he has had rectal pain for a few days. His mother found an external hemorrhoid and was able to reduce with manual pressure in the rectum. H/H stable. INR 2.1.     Rectal bleeding  -Rectal bleeding x 1 PTA.  External hemorrhoid noted by mother that she then reduced   -Multiple bloody BM during admission   -H/H trending down , given 1 unit PRBC 9/24, will give additional unit this morning   -GI consulted, planning for colonoscopy today     LVAD (left ventricular assist device) present  -HeartMate 3 Implanted 6/23/2022 with early RV failure requiring RVAD with ProTek Duo   -Hold Coumadin,  Goal INR 2.0-3.0 . Subtherapeutic today, but actively bleeding   -Antiplatelets ASA 81 mg, on hold with GIB  -LDH is stable overall today. Will continue to monitor daily.  -Speed set at 5100, LSL 4700 rpm  -Interrogation notable for no events  -Not listed for OHTx          Procedure: Device Interrogation Including analysis of device parameters  Current Settings: Ventricular Assist Device  Review of device function is stable/unstable stable        9/25/2024     9:43 AM 9/25/2024     4:50 AM 9/25/2024    12:00 AM 9/24/2024     2:15 PM 9/24/2024    12:00 PM 9/24/2024     7:50 AM 9/24/2024     1:00 AM   TXP LVAD INTERROGATIONS   Type HeartMate3 HeartMate3 HeartMate3 HeartMate3 HeartMate3 HeartMate3 HeartMate3   Flow 4 3.9 4.1 4.3 3.9 4 4   Speed 5100 5100 5100 5100 5100 5100 5100   PI 5.2 5.8 5.5 4.7 6.5 5.6 5.9   Power (Serna) 3.6 3.6 3.5 3.5 3.5 3.5 3.6   LSL  4700 4700 4700 4700 4700 4700   Pulsatility  No Pulse No Pulse No Pulse No Pulse No Pulse No Pulse         Type 2 diabetes mellitus with hyperglycemia  -IDDM  -Endocrinology consulted         Denise Espinoza PA-C  Heart Transplant  [facility] - Cardiology Stepdown

## 2024-09-25 NOTE — PROGRESS NOTES
09/25/2024  John Alaniz    Current provider:  Dalia Crum MD    Device interrogation:      9/25/2024     4:50 AM 9/25/2024    12:00 AM 9/24/2024     2:15 PM 9/24/2024    12:00 PM 9/24/2024     7:50 AM 9/24/2024     1:00 AM 9/23/2024     8:11 PM   TXP LVAD INTERROGATIONS   Type HeartMate3 HeartMate3 HeartMate3 HeartMate3 HeartMate3 HeartMate3 HeartMate3   Flow 3.9 4.1 4.3 3.9 4 4 4   Speed 5100 5100 5100 5100 5100 5100 5100   PI 5.8 5.5 4.7 6.5 5.6 5.9 6.3   Power (Serna) 3.6 3.5 3.5 3.5 3.5 3.6 3.6   LSL 4700 4700 4700 4700 4700 4700 4700   Pulsatility No Pulse No Pulse No Pulse No Pulse No Pulse No Pulse No Pulse          Rounded on Kevan Queen to ensure all mechanical assist device settings (IABP or VAD) were appropriate and all parameters were within limits.  I was able to ensure all back up equipment was present, the staff had no issues, and the Perfusion Department daily rounding was complete.      For implantable VADs: Interrogation of Ventricular assist device was performed with analysis of device parameters and review of device function. I have personally reviewed the interrogation findings and agree with findings as stated.     In emergency, the nursing units have been notified to contact the perfusion department either by:  Calling [phone] from 630am to 4pm Mon thru Fri, utilizing the On-Call Finder functionality of Epic and searching for Perfusion, or by contacting the hospital  from 4pm to 630am and on weekends and asking to speak with the perfusionist on call.    8:41 AM

## 2024-09-25 NOTE — ASSESSMENT & PLAN NOTE
Cparapsilosis fungemia    I independently reviewed patient's lab work and images as documented. 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 Plan for EGD/cscope today. No leukocytosis noted on lab work today.     Recommendations:  -continue cefazolin 6g continuous infusion iv daily, maria isabel 10/9 for 6w course  -continue fluconazole 400 mg daily, maria isabel 10/19 for 6w course   -monitor QTC and DDI  -follow up EGD/cscope

## 2024-09-25 NOTE — CARE UPDATE
-Glucose Goal 140-180    -A1C:   Hemoglobin A1C   Date Value Ref Range Status   08/25/2024 10.3 (H) 4.0 - 5.6 % Final     Comment:     ADA Screening Guidelines:  5.7-6.4%  Consistent with prediabetes  >or=6.5%  Consistent with diabetes    High levels of fetal hemoglobin interfere with the HbA1C  assay. Heterozygous hemoglobin variants (HbS, HgC, etc)do  not significantly interfere with this assay.   However, presence of multiple variants may affect accuracy.           -HOME REGIMEN:   Levemir 18 units twice daily and Novolog 10 units SSI (150/25) per patient  Last recs from recent admit,.   Lantus 12 units twice daily  - Novolog 8 units TID with meals  Add correction scale if needed.  Blood sugar 150 to 200 add 2 units  Blood sugar 201 to 250 add 4 units  Blood sugar 251 to 300 add 6 units  Blood sugar 301 to 350 add 8 units  Blood sugar greater than 350 add 10 units    -GLUCOSE TREND FOR THE PAST 24HRS:   Recent Labs   Lab 09/23/24  2045 09/24/24  0653 09/24/24  1201 09/24/24  1513 09/24/24  2035 09/25/24  0728   POCTGLUCOSE 263* 260* 297* 324* 384* 112*         -NO HYPOGYCEMIAS NOTED     - Diet  Diet NPO    T2DM.  History of LVAD.  BG above goal.  Suspect patient taking in food or drink other than bariatric clears yesterday leading to elevations. NPO today for EGD.     - continue Lantus 15 units b.i.d.  - restart Novolog 9 units, hold while NPO  - continue Novolog /25  - POCT Glucose before meals and at bedtime  - Hypoglycemia protocol in place      ** Please notify Endocrine for any change and/or advance in diet**  ** Please call Endocrine for any BG related issues **     Discharge Planning:   TBD. Please notify endocrinology prior to discharge.

## 2024-09-25 NOTE — ASSESSMENT & PLAN NOTE
-HeartMate 3 Implanted 6/23/2022 with early RV failure requiring RVAD with ProTek Duo   -Hold Coumadin,  Goal INR 2.0-3.0 . Subtherapeutic today, but actively bleeding   -Antiplatelets ASA 81 mg, on hold with GIB  -LDH is stable overall today. Will continue to monitor daily.  -Speed set at 5100, LSL 4700 rpm  -Interrogation notable for no events  -Not listed for OHTx          Procedure: Device Interrogation Including analysis of device parameters  Current Settings: Ventricular Assist Device  Review of device function is stable/unstable stable        9/25/2024     9:43 AM 9/25/2024     4:50 AM 9/25/2024    12:00 AM 9/24/2024     2:15 PM 9/24/2024    12:00 PM 9/24/2024     7:50 AM 9/24/2024     1:00 AM   TXP LVAD INTERROGATIONS   Type HeartMate3 HeartMate3 HeartMate3 HeartMate3 HeartMate3 HeartMate3 HeartMate3   Flow 4 3.9 4.1 4.3 3.9 4 4   Speed 5100 5100 5100 5100 5100 5100 5100   PI 5.2 5.8 5.5 4.7 6.5 5.6 5.9   Power (Serna) 3.6 3.6 3.5 3.5 3.5 3.5 3.6   LSL  4700 4700 4700 4700 4700 4700   Pulsatility  No Pulse No Pulse No Pulse No Pulse No Pulse No Pulse

## 2024-09-25 NOTE — CONSULTS
[facility] - [facility]iology Stepdown  Palliative Medicine  Consult Note    Patient Name: Kevan Queen  MRN: [id number]  Admission Date: 9/23/2024  Hospital Length of Stay: 2 days  Code Status: Full Code   Attending Provider: Dalia Crum MD  Consulting Provider: Carly Lowry NP  Primary Care Physician: Rosio Armendariz FNP  Principal Problem:<principal problem not specified>    Patient information was obtained from patient, past medical records, and primary team.      Inpatient consult to Palliative Care  Consult performed by: Carly Lowry NP  Consult ordered by: Denise Espinoza PA-C  Reason for consult: GOC/ACP discussions        Assessment/Plan:     Palliative Care  Palliative care encounter  Impression:  Kevan Queen is a 39 y.o. 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 Patient initially presented to [facility] ED on 9/23/24 with complaints of bloody stools, rectal pain, and an external hemorrhoid that was reduced with manual pressure prior to going to ED. Patient was then transferred to [facility] for HLOC, GI evaluation, and history of LVAD.     Patient is currently resting in bed, eating breakfast, and watching television.      Palliative Medicine was consulted by primary, Dr. Crum, for goals of care and advanced care planning discussions.      Advance Care Planning    Date: 09/24/2024    [facility]  I engaged the patient in a voluntary conversation about advance care planning and we specifically addressed what the goals of care would be moving forward, in light of the patient's change in clinical status, specifically patient's extensive cardiac history.  We did not specifically  "address the patient's likely prognosis, which is  pending GI work-up .  We explored the patient's values and preferences for future care.  The patient endorses that what is most important right now is to focus on symptom/pain control, extending life as long as possible, even it it means sacrificing quality, and curative/life-prolongation (regardless of treatment burdens)    Accordingly, we have decided that the best plan to meet the patient's goals includes continuing with treatment      ACP Reviewed/No Changes  Voluntary advance care planning discussion had today with patient. Previously completed HCPOA in electronic medical record is current, no changes made.           Discussion was ended without expectation as patient asked me to leave secondary to episode fecal incontinence.          Life Limiting Diagnosis  LVAD  Rectal Bleeding      Symptom Management  Pain  - PRN tylenol 650mg estephania 6 hours  - Can consider addition of hydrocodone-acetaminophen 5-325mg PRN pain every 4-6 hours    Dyspnea  - Patient denied    Insomnia  - Patient denied        Recommendations  - Continue management per primary team  - Patient and family would benefit from continued education and information regarding plan of care, prognosis, and what to expect in the future  - I will continue to follow.        Thank you for consulting Palliative Medicine.          Thank you for your consult. I will follow-up with patient. Please contact us if you have any additional questions.    Subjective:     HPI:   As per H&P, "Kevan Queen is a 39 y.o. 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  He was weaned off  but he had to " "restarted due to RVF and transitioned to milrinone (secondary to  shortage). Most recently admitted on 9/10-9/12 for pyelonephritis with Bcx positive for yeast. Had many GOC discussions but he did not want to be DNR/DNI, and wants to continue aggressive care. He was weaned off milrinone but was discharged with PICC for IV antibiotics. Transferred after presenting to the ED last night after one episode of bright red blood per rectum yesterday evening. Pt states he has had rectal pain for a few days. His mother found an external hemorrhoid and was able to reduce with manual pressure in the rectum. H/H stable. INR 2.1."    Patient is seen in Palliative Medicine clinic by Dr. Carr. While admitted to the hospital, primary team, Dr. Crum, consulted for inpatient Palliative Medicine to discuss goals of care and advanced care planning.     Hospital Course:  No notes on file    Interval History: Palliative Medicine reintroduced to patient. Patient amenable to GOC/ACP discussions.    Past Medical History:   Diagnosis Date    Acute respiratory failure with hypoxia 07/22/2023    Arthritis     Awareness alteration, transient 09/01/2022    Cardiomyopathy     CHF (congestive heart failure) 10/01/2020    COVID-19 06/03/2023    Diabetes mellitus     Dilated cardiomyopathy 10/26/2020    Drug abuse 10/2020    Headache 04/19/2023    Hyperglycemia 12/16/2022    Hyperosmolar hyperglycemic state (HHS) 05/25/2022    ICD (implantable cardioverter-defibrillator) in place 10/26/2020    Infected defibrillator now s/p removal Nov 2023 07/23/2023    Left ventricular assist device (LVAD) complication, initial encounter 06/05/2023    Muscle cramping 06/15/2022    Renal disorder     SOB (shortness of breath) 06/13/2022    Tingling in extremities 07/13/2022       Past Surgical History:   Procedure Laterality Date    APPLICATION OF WOUND VACUUM-ASSISTED CLOSURE DEVICE N/A 6/30/2022    Procedure: APPLICATION, WOUND VAC;  Surgeon: Luis F Paige, " MD;  Location: [facility] OR [facility] FLR;  Service: Cardiovascular;  Laterality: N/A;  50 x 5 cm    CARDIAC DEFIBRILLATOR PLACEMENT      ECHOCARDIOGRAM,TRANSESOPHAGEAL  11/9/2023    Procedure: Transesophageal echo (GUILLERMO) intra-procedure log documentation;  Surgeon: Eron Ivy MD;  Location: [facility] EP LAB;  Service: Cardiology;;    EXTRACTION, ELECTRODE LEAD Left 11/9/2023    Procedure: EXTRACTION, ELECTRODE LEAD;  Surgeon: ADALID Leon MD;  Location: [facility] EP LAB;  Service: Cardiology;  Laterality: Left;  INFECTION, LEAD EXTRACTION, GUILLERMO, BSCI, ANES, EH,   *NO CTS BACKUP*    IMPLANTATION OF RIGHT VENTRICULAR ASSIST DEVICE (RVAD) N/A 6/29/2022    Procedure: INSERTION, RVAD;  Surgeon: Luis F Paige MD;  [facility];  Service: Cardiovascular;  Laterality: N/A;    INSERTION OF GRAFT TO PERICARDIUM Right 6/30/2022    Procedure: INSERTION-RIGHT VENTRICULAR ASSIST DEVICE;  Surgeon: Luis F Paige MD;  [address]  Service: Cardiovascular;  Laterality: Right;    IRRIGATION OF MEDIASTINUM  6/30/2022    Procedure: IRRIGATION, MEDIASTINUM;  Surgeon: Luis F Paige MD;  [facility];  Service: Cardiovascular;;    LEFT VENTRICULAR ASSIST DEVICE Left 6/23/2022    Procedure: INSERTION-LEFT VENTRICULAR ASSIST DEVICE;  Surgeon: Luis F Paige MD;  [facility];  Service: Cardiovascular;  Laterality: Left;    LEFT VENTRICULAR ASSIST DEVICE N/A 6/29/2022    Procedure: INSERTION-LEFT VENTRICULAR ASSIST DEVICE;  Surgeon: Luis F Paige MD;  [facility];  Service: Cardiovascular;  Laterality: N/A;    REVISION OF SKIN POCKET FOR CARDIOVERTER-DEFIBRILLATOR  11/9/2023    Procedure: REVISION, SKIN POCKET, FOR CARDIOVERTER-DEFIBRILLATOR;  Surgeon: ADALID Leon MD;  Location: [facility] EP LAB;  Service: Cardiology;;    RIGHT HEART CATHETERIZATION Right 4/8/2022    Procedure: INSERTION, CATHETER, RIGHT HEART;  Surgeon: Luca Lopez Jr., MD;  Location: [facility] CATH  LAB;  Service: Cardiology;  Laterality: Right;    RIGHT HEART CATHETERIZATION Right 4/19/2022    Procedure: INSERTION, CATHETER, RIGHT HEART;  Surgeon: Josh Pulido MD;  Location: [facility] CATH LAB;  Service: Cardiology;  Laterality: Right;    RIGHT HEART CATHETERIZATION Right 7/21/2022    Procedure: INSERTION, CATHETER, RIGHT HEART;  Surgeon: Dalia Crum MD;  Location: [facility] CATH LAB;  Service: Cardiology;  Laterality: Right;    RIGHT HEART CATHETERIZATION Right 10/31/2022    Procedure: INSERTION, CATHETER, RIGHT HEART;  Surgeon: Dalia Crum MD;  Location: [facility] CATH LAB;  Service: Cardiology;  Laterality: Right;    STERNAL WOUND CLOSURE N/A 6/30/2022    Procedure: CLOSURE, WOUND, STERNUM;  Surgeon: Luis F Paige MD;  Location: [facility] OR [address] [place] [address];  Service: Cardiovascular;  Laterality: N/A;       Review of patient's allergies indicates:   Allergen Reactions    Torsemide Hives       Medications:  Continuous Infusions:  Scheduled Meds:   alteplase  2 mg Intra-Catheter Once    amiodarone  200 mg Oral Daily    amLODIPine  5 mg Oral Daily    aspirin  81 mg Oral Daily    atorvastatin  40 mg Oral Daily    ceFAZolin (ANCEF) 6 g in D5W 500 mL CONTINUOUS INFUSION  6 g Intravenous Q24H    DULoxetine  30 mg Oral Daily    eplerenone  25 mg Oral Daily    fluconazole  400 mg Oral Daily    furosemide (LASIX) injection  80 mg Intravenous TID    gabapentin  100 mg Oral Daily    And    gabapentin  400 mg Oral QHS    hydrocortisone  25 mg Rectal BID    insulin aspart U-100  7 Units Subcutaneous TIDWM    insulin glargine U-100  21 Units Subcutaneous Daily    levETIRAcetam  1,500 mg Oral BID    magnesium oxide  400 mg Oral Daily    magnesium sulfate IVPB  2 g Intravenous Once    pantoprazole  40 mg Oral Daily    potassium chloride  30 mEq Oral Daily    warfarin  1.5 mg Oral Daily     PRN Meds:  Current Facility-Administered Medications:     acetaminophen, 650 mg, Oral, Q6H PRN    cyclobenzaprine, 5 mg, Oral, Daily PRN     dextrose 10%, 12.5 g, Intravenous, PRN    dextrose 10%, 25 g, Intravenous, PRN    glucagon (human recombinant), 1 mg, Intramuscular, PRN    glucose, 16 g, Oral, PRN    glucose, 24 g, Oral, PRN    insulin aspart U-100, 0-10 Units, Subcutaneous, QID (AC + HS) PRN    ondansetron, 4 mg, Oral, Q8H PRN    Family History       Problem Relation (Age of Onset)    Diverticulosis Brother    Heart attack Maternal Grandmother, Maternal Grandfather    Heart failure Father          Tobacco Use    Smoking status: Former     Current packs/day: 0.00     Average packs/day: 0.5 packs/day for 16.6 years (8.3 ttl pk-yrs)     Types: Cigarettes     Start date: 10/1/2004     Quit date: 4/23/2021     Years since quitting: 3.4    Smokeless tobacco: Never   Substance and Sexual Activity    Alcohol use: Not Currently    Drug use: Not Currently     Types: Marijuana, MDMA (Ecstacy)    Sexual activity: Yes     Partners: Female     Birth control/protection: None       Review of Systems   Constitutional:  Positive for activity change and fatigue.   Respiratory: Negative.     Cardiovascular: Negative.    Musculoskeletal:  Positive for back pain.   Neurological:  Positive for weakness.     Objective:     Vital Signs (Most Recent):  Temp: 98 °F (36.7 °C) (09/24/24 0455)  Pulse: 82 (09/24/24 0323)  Resp: 18 (09/24/24 0455)  BP: (!) 80/0 (09/24/24 0400)  SpO2: 100 % (09/24/24 0455) Vital Signs (24h Range):  Temp:  [97.2 °F (36.2 °C)-98.5 °F (36.9 °C)] 98 °F (36.7 °C)  Pulse:  [76-97] 82  Resp:  [18-19] 18  SpO2:  [98 %-100 %] 100 %  BP: (80-95)/(0-66) 80/0     Weight: 74.5 kg (164 lb 3.9 oz)  Body mass index is 20.53 kg/m².       Physical Exam       Review of Symptoms      Symptom Assessment (ESAS 0-10 Scale)  Pain:  6  Dyspnea:  0  Anxiety:  0  Nausea:  0  Depression:  0  Anorexia:  0  Fatigue:  0  Insomnia:  0  Restlessness:  0  Agitation:  0         Pain Assessment:  OME in 24 hours:  0  Location(s): back    Back       Location: generalized         Quality: Aching and dull        Quantity: 9/10 in intensity        Chronicity: Onset 10 minute(s) ago, gradually worsening        Aggravating Factors: Activity and standing        Alleviating Factors: Recumbency       Associated Symptoms: None    Performance Status:  60    Living Arrangements:  Lives with family    Psychosocial/Cultural:   See Palliative Psychosocial Note: Yes  **Primary  to Follow**  Palliative Care  Consult: No        Advance Care Planning   Advance Directives:   Living Will: No    LaPOST: No    Do Not Resuscitate Status: No    Medical Power of : Yes    Goals of Care: What is most important right now is to focus on symptom/pain control, extending life as long as possible, even it it means sacrificing quality. Accordingly, we have decided that the best plan to meet the patient's goals includes continuing with treatment.         Significant Labs: All pertinent labs within the past 24 hours have been reviewed.  CBC:   Recent Labs   Lab 09/24/24 0313   WBC 8.56   HGB 7.0*   HCT 25.2*   MCV 68*        BMP:  Recent Labs   Lab 09/24/24  0312   *   *   K 3.1*   CL 95   CO2 30*   BUN 13   CREATININE 0.9   CALCIUM 8.6*   MG 1.7     LFT:  Lab Results   Component Value Date    AST 40 (H) 09/23/2024    ALKPHOS 264 (H) 09/23/2024    BILITOT 1.3 09/23/2024     Albumin:   Albumin   Date Value Ref Range Status   09/23/2024 2.5 (L) 3.5 - 5.0 g/dL Final   09/09/2024 2.7 (L) 3.5 - 5.2 g/dL Final     Protein:   Total Protein   Date Value Ref Range Status   09/09/2024 9.2 (H) 6.0 - 8.4 g/dL Final     Lactic acid:   Lab Results   Component Value Date    LACTATE 1.5 01/23/2024    LACTATE 3.6 (HH) 01/23/2024       Significant Imaging: I have reviewed all pertinent imaging results/findings within the past 24 hours.      CT Abdomen-Pelvis 9/23/24  Impression:  1. There is extensive stranding of the subcutaneous fat as well as the intraabdominal fat associated with small  volume ascites suggesting an element of anasarca edema possibly due to congestion.  2. Mild intraperitoneal free fluid is seen in the perihepatic, parasplenic, right paracolic and pelvic recesses.  3. There is pronounced distension of the colon filled with gas and a large amount of stool consistent with constipation. A large amount of stool is seen in the severely distended rectum associated with rectal wall thickening and mucosal enhancement. This suggests proctitis possibly due to fecal impaction. Correlate clinically as regards further evaluation and follow up.  4. The liver appears prominent with heterogeneous parenchyma attenuation and with a somewhat lobulated contour. This suggests hepatocellular disease possibly due to passive congestion. Correlate clinically as regards further evaluation and follow up.  5. No significant intraluminal hyperdense collection, contrast blush, contrast pooling or contrast extravasation is seen in the bowel to suggest acute gastrointestinal hemorrhage at the time of examination.  6. There are prominent areas of severely diminished parenchymal enhancement in the right kidney as seen on series 10, image 77 and subtle areas of diminished cortical enhancement in the left kidney as noted on series 10 image 69. These findings suggest acute pyelonephritis with ischemic change not entirely excluded.  Correlate clinically as regards further evaluation and follow up  7. Details and other findings as discussed above.    In my care of this patient with acute on chronic severe illness with threat to life and/or bodily function, I am recommending goal-concordant care as noted above. I spent a significant amount of time reviewing external records/ recommendations of other providers, reviewing recent test results, and discussed care with other subspecialists involved    The above recommendations communicated directly to primary team on 8/24/24.    Additional 20min time spent on a voluntary  advance care planning and /or goals of care discussion, providing emotional support, formulating, and communicating prognosis and exploring burden/benefit of various approaches of treatment.         Carly Lowry, LUCÍA  Palliative Medicine  [facility] - Cardiology Stepdown

## 2024-09-25 NOTE — H&P
Short Stay Endoscopy History and Physical    PCP - Rosio Armendariz FNP    Procedure - EGD/Colonoscopy  ASA - per anesthesia  Mallampati - per anesthesia  History of Anesthesia problems - no  Family history Anesthesia problems -  no   Plan of anesthesia - MAC    HPI:  This is a 39 y.o. male here for evaluation of : hematochezia.    ROS:  Constitutional: No fevers, chills, No weight loss  CV: No chest pain  Pulm: No cough, No shortness of breath  Ophtho: No vision changes  GI: see HPI  Derm: No rash    Medical History:  has a past medical history of Acute respiratory failure with hypoxia (07/22/2023), Arthritis, Awareness alteration, transient (09/01/2022), Cardiomyopathy, CHF (congestive heart failure) (10/01/2020), COVID-19 (06/03/2023), Diabetes mellitus, Dilated cardiomyopathy (10/26/2020), Drug abuse (10/2020), Headache (04/19/2023), Hyperglycemia (12/16/2022), Hyperosmolar hyperglycemic state (HHS) (05/25/2022), ICD (implantable cardioverter-defibrillator) in place (10/26/2020), Infected defibrillator now s/p removal Nov 2023 (07/23/2023), Left ventricular assist device (LVAD) complication, initial encounter (06/05/2023), Muscle cramping (06/15/2022), Renal disorder, SOB (shortness of breath) (06/13/2022), and Tingling in extremities (07/13/2022).    Surgical History:  has a past surgical history that includes Cardiac defibrillator placement; Right heart catheterization (Right, 4/8/2022); Right heart catheterization (Right, 4/19/2022); Left ventricular assist device (Left, 6/23/2022); Left ventricular assist device (N/A, 6/29/2022); Implantation of right ventricular assist device (RVAD) (N/A, 6/29/2022); Sternal wound closure (N/A, 6/30/2022); Insertion of graft to pericardium (Right, 6/30/2022); Application of wound vacuum-assisted closure device (N/A, 6/30/2022); Irrigation of mediastinum (6/30/2022); Right heart catheterization (Right, 7/21/2022); Right heart catheterization (Right, 10/31/2022); extraction,  electrode lead (Left, 2023); Revision of skin pocket for cardioverter-defibrillator (2023); and echocardiogram,transesophageal (2023).    Family History: family history includes Diverticulosis in his brother; Heart attack in his maternal grandfather and maternal grandmother; Heart failure in his father.. Otherwise no colon cancer, inflammatory bowel disease, or GI malignancies.    Social History:  reports that he quit smoking about 3 years ago. His smoking use included cigarettes. He started smoking about 19 years ago. He has a 8.3 pack-year smoking history. He has never used smokeless tobacco. He reports that he does not currently use alcohol. He reports that he does not currently use drugs after having used the following drugs: Marijuana and MDMA (Ecstacy).    Review of patient's allergies indicates:   Allergen Reactions    Torsemide Hives       Medications:   Medications Prior to Admission   Medication Sig Dispense Refill Last Dose    acetaminophen (TYLENOL) 325 MG tablet Take 2 tablets (650 mg total) by mouth every 6 (six) hours as needed for Pain.  0 2024    amiodarone (PACERONE) 200 MG Tab Take 1 tablet (200 mg total) by mouth once daily. 30 tablet 11 2024    amLODIPine (NORVASC) 5 MG tablet Take 1 tablet (5 mg total) by mouth once daily. 90 tablet 3 2024    aspirin (ECOTRIN) 81 MG EC tablet Take 1 tablet (81 mg total) by mouth once daily. 90 tablet 3 2024    atorvastatin (LIPITOR) 40 MG tablet Take 1 tablet (40 mg total) by mouth once daily. 90 tablet 3 2024    [] cyclobenzaprine (FLEXERIL) 5 MG tablet Take 1 tablet (5 mg total) by mouth daily as needed for Muscle spasms. To be taken prior to daily lasix dose. 30 tablet 3 2024    DULoxetine (CYMBALTA) 30 MG capsule Take 1 capsule (30 mg total) by mouth once daily. 30 capsule 11 2024    eplerenone (INSPRA) 25 MG Tab Take 1 tablet (25 mg total) by mouth once daily. 30 tablet 11 2024    fluconazole  (DIFLUCAN) 200 MG Tab Take 2 tablets (400 mg total) by mouth once daily. 56 tablet 0 9/22/2024    furosemide (LASIX) 80 MG tablet Take 1 tablet (80 mg total) by mouth once daily. 30 tablet 11 9/22/2024    gabapentin (NEURONTIN) 100 MG capsule Take 1 capsule (100 mg total) by mouth 2 (two) times daily AND 4 capsules (400 mg total) every evening. 180 capsule 11 9/22/2024    insulin aspart U-100 (NOVOLOG) 100 unit/mL (3 mL) InPn pen Inject 18 Units into the skin 3 (three) times daily with meals: MAX 94 units/daily  150-200    201-250    251-300    301-350    >350  +2 units   +4 units    +6 units    +8 units    +10 units   Past Week    lancets 33 gauge Misc Use to test blood glucose 4 (four) times daily. 200 each 3 9/22/2024    levETIRAcetam (KEPPRA) 1000 MG tablet Take 1.5 tablets (1,500 mg total) by mouth 2 (two) times daily. 60 tablet 11 9/22/2024    magnesium oxide (MAG-OX) 400 mg (241.3 mg magnesium) tablet Take 1 tablet (400 mg total) by mouth once daily. 30 tablet 11 9/22/2024    ondansetron (ZOFRAN-ODT) 4 MG TbDL Take 1 tablet (4 mg total) by mouth every 8 (eight) hours as needed (nausea). 10 tablet 0 9/22/2024    pantoprazole (PROTONIX) 40 MG tablet Take 1 tablet (40 mg total) by mouth once daily. 30 tablet 11 9/22/2024    potassium chloride SA (K-DUR,KLOR-CON M) 10 MEQ tablet Take 3 tablets (30 mEq total) by mouth once daily. 90 tablet 3 9/22/2024    warfarin (COUMADIN) 3 MG tablet Take 0.5 tablets (1.5 mg total) by mouth Daily. 30 tablet 5 9/22/2024    blood sugar diagnostic Strp Use to test blood glucose 4 (four) times daily. 200 each 5     blood-glucose meter (TRUE METRIX GLUCOSE METER) Misc Use to test blood glucose 4 (four) times daily. 1 each 0     blood-glucose sensor (FREESTYLE LUCIUS 3 SENSOR) Dee 1 Device by Misc.(Non-Drug; Combo Route) route every 14 (fourteen) days. 3 each 9     D5W PgBk 50 mL with ceFAZolin 2 gram SolR 6 g Inject 6 g into the vein once daily.       glucagon (BAQSIMI) 3  "mg/actuation Spry 1 each by Nasal route as needed (hypoglycemia). 2 each 3     insulin detemir U-100, Levemir, 100 unit/mL (3 mL) SubQ InPn pen Inject 12 Units into the skin 2 (two) times daily. In the morning and before bed.       pen needle, diabetic 32 gauge x 5/32" Ndle 1 each by Misc.(Non-Drug; Combo Route) route 4 (four) times daily. 200 each 11        Physical Exam:    Vital Signs:   Vitals:    09/25/24 1140   BP:    Pulse: 85   Resp:    Temp:        General Appearance: Well appearing in no acute distress  Abdomen:  non distended     Labs:  Lab Results   Component Value Date    WBC 11.45 09/25/2024    HGB 6.9 (L) 09/25/2024    HCT 23.1 (L) 09/25/2024     09/25/2024    CHOL 118 (L) 08/31/2023    TRIG 89 08/31/2023    HDL 37 (L) 08/31/2023    ALT 6 (L) 09/25/2024    AST 28 09/25/2024     09/25/2024    K 3.3 (L) 09/25/2024    CL 95 09/25/2024    CREATININE 0.9 09/25/2024    BUN 10 09/25/2024    CO2 31 (H) 09/25/2024    TSH 2.009 08/26/2024    PSA 6.89 (H) 04/12/2021    INR 1.9 (H) 09/25/2024    HGBA1C 10.3 (H) 08/25/2024       I have explained the risks and benefits of endoscopy procedures to the patient including but not limited to bleeding, perforation, infection, and death.  The patient was asked if they understand and allowed to ask any further questions to their satisfaction.    Connie Bhat MD    "

## 2024-09-25 NOTE — SUBJECTIVE & OBJECTIVE
Interval History: Continues to have large bloody BMs overnight (4 total). Given 1 unit pRBC yesterday. Hgb 6.9 this morning. Will give 1 unit PRBC prior to colonoscopy today. Coumadin, ASA remain on hold.     Continuous Infusions:  Scheduled Meds:   alteplase  2 mg Intra-Catheter Once    amiodarone  200 mg Oral Daily    amLODIPine  5 mg Oral Daily    atorvastatin  40 mg Oral Daily    ceFAZolin (ANCEF) 6 g in D5W 500 mL CONTINUOUS INFUSION  6 g Intravenous Q24H    DULoxetine  30 mg Oral Daily    eplerenone  25 mg Oral Daily    fluconazole  400 mg Oral Daily    furosemide (LASIX) injection  80 mg Intravenous TID    gabapentin  100 mg Oral Daily    And    gabapentin  400 mg Oral QHS    hydrocortisone  25 mg Rectal BID    insulin aspart U-100  9 Units Subcutaneous TIDWM    insulin glargine U-100  15 Units Subcutaneous BID    levETIRAcetam  1,500 mg Oral BID    magnesium oxide  400 mg Oral Daily    magnesium sulfate IVPB  2 g Intravenous Once    pantoprazole  40 mg Oral Daily    potassium chloride  30 mEq Oral Daily     PRN Meds:  Current Facility-Administered Medications:     0.9%  NaCl infusion (for blood administration), , Intravenous, Q24H PRN    0.9%  NaCl infusion (for blood administration), , Intravenous, Q24H PRN    acetaminophen, 650 mg, Oral, Q6H PRN    cyclobenzaprine, 5 mg, Oral, Daily PRN    dextrose 10%, 12.5 g, Intravenous, PRN    dextrose 10%, 25 g, Intravenous, PRN    glucagon (human recombinant), 1 mg, Intramuscular, PRN    glucose, 16 g, Oral, PRN    glucose, 24 g, Oral, PRN    insulin aspart U-100, 0-10 Units, Subcutaneous, QID (AC + HS) PRN    ondansetron, 4 mg, Oral, Q8H PRN    Review of patient's allergies indicates:   Allergen Reactions    Torsemide Hives     Objective:     Vital Signs (Most Recent):  Temp: 98.3 °F (36.8 °C) (09/25/24 0728)  Pulse: 82 (09/25/24 0728)  Resp: 18 (09/25/24 0728)  BP: (!) 82/0 (09/25/24 0728)  SpO2: 99 % (09/25/24 0728) Vital Signs (24h Range):  Temp:  [97.2 °F (36.2  °C)-98.5 °F (36.9 °C)] 98.3 °F (36.8 °C)  Pulse:  [73-87] 82  Resp:  [18-20] 18  SpO2:  [91 %-99 %] 99 %  BP: (74-90)/(0) 82/0     Patient Vitals for the past 72 hrs (Last 3 readings):   Weight   09/23/24 1331 74.5 kg (164 lb 3.9 oz)     Body mass index is 20.53 kg/m².      Intake/Output Summary (Last 24 hours) at 9/25/2024 1005  Last data filed at 9/25/2024 0555  Gross per 24 hour   Intake 4960.83 ml   Output 2320 ml   Net 2640.83 ml       Hemodynamic Parameters:  CVP:  [8 mmHg-13 mmHg] 8 mmHg           Physical Exam  Vitals reviewed.   HENT:      Head: Normocephalic.      Nose: Nose normal.   Eyes:      Conjunctiva/sclera: Conjunctivae normal.   Cardiovascular:      Rate and Rhythm: Normal rate and regular rhythm.      Comments: Smooth VAD hum   Pulmonary:      Effort: Pulmonary effort is normal.   Abdominal:      General: Abdomen is flat.      Tenderness: There is no abdominal tenderness.   Musculoskeletal:         General: Normal range of motion.      Cervical back: Normal range of motion.   Skin:     General: Skin is warm.      Capillary Refill: Capillary refill takes less than 2 seconds.   Neurological:      General: No focal deficit present.      Mental Status: He is alert.   Psychiatric:         Mood and Affect: Mood normal.         Behavior: Behavior normal.         Thought Content: Thought content normal.         Judgment: Judgment normal.            Significant Labs:  CBC:  Recent Labs   Lab 09/24/24  1318 09/24/24  1825 09/25/24  0420 09/25/24  0630   WBC 9.18 8.13  --  11.45   RBC 3.48* 3.33*  --  3.35*   HGB 6.8* 6.6*  --  6.9*   HCT 24.1* 22.8* 27* 23.1*    253  --  219   MCV 69* 69*  --  69*   MCH 19.5* 19.8*  --  20.6*   MCHC 28.2* 28.9*  --  29.9*     BNP:  Recent Labs   Lab 09/25/24  0359   *     CMP:  Recent Labs   Lab 09/23/24  0205 09/24/24  0312 09/25/24  0359   GLU  --  256* 139*   CALCIUM 8.5 8.6* 8.7   ALBUMIN 2.5*  --  2.3*   PROT  --   --  7.7   * 132* 136   K 3.3*  3.1* 3.3*   CO2 28 30* 31*   CL 93* 95 95   BUN 13.0 13 10   CREATININE 1.33* 0.9 0.9   ALKPHOS 264*  --  230*   ALT 12  --  6*   AST 40*  --  28   BILITOT 1.3  --  2.0*      Coagulation:   Recent Labs   Lab 09/23/24  0205 09/23/24  2316 09/24/24  0313 09/25/24  0359   INR 2.1* 2.0* 2.0* 1.9*   APTT 33.1  --  37.0* 35.0*     LDH:  Recent Labs   Lab 09/24/24  0313 09/25/24  0359   * 255     Microbiology:  Microbiology Results (last 7 days)       ** No results found for the last 168 hours. **            I have reviewed all pertinent labs within the past 24 hours.    Estimated Creatinine Clearance: 116.1 mL/min (based on SCr of 0.9 mg/dL).    Diagnostic Results:  I have reviewed all pertinent imaging results/findings within the past 24 hours.

## 2024-09-25 NOTE — NURSING
LVAD dressing change completed using sterile technique with Daily kit. DLES is a 2 with minimal drainage noted on the drain sponge. Tolerated without any complication. Sutures remain intact with no redness noted.

## 2024-09-25 NOTE — PROVATION PATIENT INSTRUCTIONS
Discharge Summary/Instructions after an Endoscopic Procedure  Patient Name: Kevan Queen  Patient MRN: [id number]  Patient YOB: 1985 Wednesday, September 25, 2024  Drake Barton MD  Dear patient,  As a result of recent federal legislation (The Federal Cures Act), you may   receive lab or pathology results from your procedure in your [facility]   account before your physician is able to contact you. Your physician or   their representative will relay the results to you with their   recommendations at their soonest availability.  Thank you,  RESTRICTIONS:  During your procedure today, you received medications for sedation.  These   medications may affect your judgment, balance and coordination.  Therefore,   for 24 hours, you have the following restrictions:   - DO NOT drive a car, operate machinery, make legal/financial decisions,   sign important papers or drink alcohol.    ACTIVITY:  Today: no heavy lifting, straining or running due to procedural   sedation/anesthesia.  The following day: return to full activity including work.  DIET:  Eat and drink normally unless instructed otherwise.     TREATMENT FOR COMMON SIDE EFFECTS:  - Mild abdominal pain, nausea, belching, bloating or excessive gas:  rest,   eat lightly and use a heating pad.  - Sore Throat: treat with throat lozenges and/or gargle with warm salt   water.  - Because air was used during the procedure, expelling large amounts of air   from your rectum or belching is normal.  - If a bowel prep was taken, you may not have a bowel movement for 1-3 days.    This is normal.  SYMPTOMS TO WATCH FOR AND REPORT TO YOUR PHYSICIAN:  1. Abdominal pain or bloating, other than gas cramps.  2. Chest pain.  3. Back pain.  4. Signs of infection such as: chills or fever occurring within 24 hours   after the procedure.  5. Rectal bleeding, which would show as bright red, maroon, or black stools.   (A tablespoon of blood from the rectum is not serious, especially  if   hemorrhoids are present.)  6. Vomiting.  7. Weakness or dizziness.  GO DIRECTLY TO THE NEAREST EMERGENCY ROOM IF YOU HAVE ANY OF THE FOLLOWING:      Difficulty breathing              Chills and/or fever over 101 F   Persistent vomiting and/or vomiting blood   Severe abdominal pain   Severe chest pain   Black, tarry stools   Bleeding- more than one tablespoon   Any other symptom or condition that you feel may need urgent attention  Your doctor recommends these additional instructions:  If any biopsies were taken, your doctors clinic will contact you in 1 to 2   weeks with any results.  - Perform a colonoscopy today.   - See colonoscopy report for further recommendations.  For questions, problems or results please call your physician - Drake Barton MD at Work:  [phone].  [facility], EMERGENCY ROOM PHONE NUMBER: [phone]  IF A COMPLICATION OR EMERGENCY SITUATION ARISES AND YOU ARE UNABLE TO REACH   YOUR PHYSICIAN - GO DIRECTLY TO THE EMERGENCY ROOM.  Drake Barton MD  9/25/2024 3:39:54 PM  This report has been verified and signed electronically.  Dear patient,  As a result of recent federal legislation (The Federal Cures Act), you may   receive lab or pathology results from your procedure in your [facility]   account before your physician is able to contact you. Your physician or   their representative will relay the results to you with their   recommendations at their soonest availability.  Thank you,  PROVATION

## 2024-09-25 NOTE — SUBJECTIVE & OBJECTIVE
Interval History: No fevers documented overnight.   Pending cscope today. Pt reports feeling better, tolerating antimicrobials without issues.     Review of Systems   Constitutional:  Negative for chills and fever.   Gastrointestinal:  Positive for blood in stool.   All other systems reviewed and are negative.    Objective:     Vital Signs (Most Recent):  Temp: 98.3 °F (36.8 °C) (09/25/24 0728)  Pulse: 82 (09/25/24 0728)  Resp: 18 (09/25/24 0728)  BP: (!) 86/0 (09/25/24 0415)  SpO2: 99 % (09/25/24 0728) Vital Signs (24h Range):  Temp:  [97.2 °F (36.2 °C)-98.5 °F (36.9 °C)] 98.3 °F (36.8 °C)  Pulse:  [73-87] 82  Resp:  [18-20] 18  SpO2:  [91 %-99 %] 99 %  BP: (74-90)/(0) 86/0     Weight: 74.5 kg (164 lb 3.9 oz)  Body mass index is 20.53 kg/m².    Estimated Creatinine Clearance: 116.1 mL/min (based on SCr of 0.9 mg/dL).     Physical Exam  Constitutional:       General: He is not in acute distress.     Appearance: He is not ill-appearing or toxic-appearing.   Eyes:      General:         Right eye: No discharge.         Left eye: No discharge.   Pulmonary:      Effort: Pulmonary effort is normal. No respiratory distress.   Abdominal:      Comments: LVAD dressed   Musculoskeletal:      Right lower leg: No edema.      Left lower leg: No edema.   Skin:     General: Skin is warm and dry.      Comments: R [facility] picc   Neurological:      Mental Status: He is alert and oriented to person, place, and time.          Significant Labs:   Microbiology Results (last 7 days)       ** No results found for the last 168 hours. **            Significant Imaging: I have reviewed all pertinent imaging results/findings within the past 24 hours.

## 2024-09-25 NOTE — TREATMENT PLAN
GI Treatment Plan    S/p EGD and colonoscopy     EGD  Impression:            - Normal esophagus.                          - Congested and erythematous mucosa in the gastric                          fundus and gastric body.                          - Normal antrum.                          - Normal examined duodenum.                          - No specimens collected.   Recommendation:        - Perform a colonoscopy today.                          - See colonoscopy report for further                          recommendations.     Colonoscopy   Impression:            - Preparation of the colon was poor.                          - Hemorrhoids found on perianal exam.                          - Stool in the entire examined colon.                          - A few ulcers in the rectum.                          - A single ulcer in the distal rectum with visible                          vessel and oozing blood. Clips were placed.                          - No specimens collected.   Recommendation:        - Return [facility] to [facility] for [facility] care.                          - Advance diet as tolerated.                          - Continue present medications.                          - Repeat colonoscopy at age 45 for screening                          purposes.                          - If he has ongoing rectal bleeding, would repeat                          colonoscopy for additional treatment.       Thank you for involving us in the care of Kevan Queen. Please call with any additional questions, concerns or changes in the patient's clinical status.    Lola Starr MD  Gastroenterology Fellow, PGY IV

## 2024-09-25 NOTE — ANESTHESIA POSTPROCEDURE EVALUATION
Anesthesia Post Evaluation    Patient: Kevan Queen    Procedure(s) Performed: Procedure(s) (LRB):  COLONOSCOPY (N/A)  EGD (ESOPHAGOGASTRODUODENOSCOPY) (N/A)    Final Anesthesia Type: general      Patient location during evaluation: PACU  Patient participation: Yes- Able to Participate  Level of consciousness: awake and alert  Post-procedure vital signs: reviewed and stable  Pain management: adequate  Airway patency: patent    PONV status at discharge: No PONV  Anesthetic complications: no      Cardiovascular status: blood pressure returned to baseline  Respiratory status: spontaneous ventilation and room air  Hydration status: euvolemic  Follow-up not needed.              Vitals Value Taken Time   /61 09/25/24 1633   Temp 36.7 °C (98.1 °F) 09/25/24 1630   Pulse 88 09/25/24 1642   Resp 40 09/25/24 1641   SpO2 74 % 09/25/24 1632   Vitals shown include unfiled device data.      Event Time   Out of Recovery 16:45:00         Pain/Gopi Score: Pain Rating Prior to Med Admin: 10 (9/24/2024  8:36 PM)  Pain Rating Post Med Admin: 0 (9/24/2024  9:36 PM)  Gopi Score: 9 (9/25/2024  4:15 PM)

## 2024-09-25 NOTE — NURSING TRANSFER
Nursing Transfer Note      9/25/2024   4:32 PM    Nurse giving handoff:Kindra MODI PACU  Nurse receiving handoff:Hilton MODI    Reason patient is being transferred: MD order    Transfer To: [id number]    Transfer via stretcher    Transfer with cardiac monitoring    Transported by RN      Telemetry: Box Number [id number], Rate 82, and Rhythm SR  Order for Tele Monitor? Yes      Medicines sent: cefazolin    Any special needs or follow-up needed: LVAD    Patient belongings transferred with patient: Yes LAVD    Chart send with patient: Yes    Notified: patient said he will notify    Patient reassessed at: 1630 (date, time)  1  Upon arrival to floor: call bell in reach and bed in lowest position

## 2024-09-25 NOTE — PROGRESS NOTES
Heart Transplant  spoke with pt twice today by phone for admit assessment.  Assessment not complete at this time due to a medical needs.    Transplant Social Workers will follow in order to complete assessment.

## 2024-09-25 NOTE — PT/OT/SLP PROGRESS
Physical Therapy      Patient Name:  Kevan Queen   MRN:  [id number]    Patient not seen today secondary to: critical lab value (low H/H) and anticipated EGD/colonoscopy this afternoon to address active GIB. Will follow-up when medically appropriate for skilled therapeutic evaluation.    9/25/2024

## 2024-09-26 LAB
ALLENS TEST: ABNORMAL
ANION GAP SERPL CALC-SCNC: 11 MMOL/L (ref 8–16)
ANISOCYTOSIS BLD QL SMEAR: SLIGHT
APTT PPP: 34.7 SEC (ref 21–32)
BASOPHILS # BLD AUTO: 0.03 K/UL (ref 0–0.2)
BASOPHILS # BLD AUTO: 0.06 K/UL (ref 0–0.2)
BASOPHILS NFR BLD: 0.3 % (ref 0–1.9)
BASOPHILS NFR BLD: 0.8 % (ref 0–1.9)
BUN SERPL-MCNC: 13 MG/DL (ref 6–20)
CALCIUM SERPL-MCNC: 9.1 MG/DL (ref 8.7–10.5)
CHLORIDE SERPL-SCNC: 96 MMOL/L (ref 95–110)
CO2 SERPL-SCNC: 29 MMOL/L (ref 23–29)
CREAT SERPL-MCNC: 1 MG/DL (ref 0.5–1.4)
DELSYS: ABNORMAL
DIFFERENTIAL METHOD BLD: ABNORMAL
DIFFERENTIAL METHOD BLD: ABNORMAL
EOSINOPHIL # BLD AUTO: 0 K/UL (ref 0–0.5)
EOSINOPHIL # BLD AUTO: 0.1 K/UL (ref 0–0.5)
EOSINOPHIL NFR BLD: 0.4 % (ref 0–8)
EOSINOPHIL NFR BLD: 0.9 % (ref 0–8)
ERYTHROCYTE [DISTWIDTH] IN BLOOD BY AUTOMATED COUNT: 26.8 % (ref 11.5–14.5)
ERYTHROCYTE [DISTWIDTH] IN BLOOD BY AUTOMATED COUNT: 27.1 % (ref 11.5–14.5)
EST. GFR  (NO RACE VARIABLE): >60 ML/MIN/1.73 M^2
GLUCOSE SERPL-MCNC: 166 MG/DL (ref 70–110)
HCT VFR BLD AUTO: 23.8 % (ref 40–54)
HCT VFR BLD AUTO: 25.6 % (ref 40–54)
HGB BLD-MCNC: 7.3 G/DL (ref 14–18)
HGB BLD-MCNC: 7.6 G/DL (ref 14–18)
HYPOCHROMIA BLD QL SMEAR: ABNORMAL
IMM GRANULOCYTES # BLD AUTO: 0.02 K/UL (ref 0–0.04)
IMM GRANULOCYTES # BLD AUTO: 0.05 K/UL (ref 0–0.04)
IMM GRANULOCYTES NFR BLD AUTO: 0.3 % (ref 0–0.5)
IMM GRANULOCYTES NFR BLD AUTO: 0.5 % (ref 0–0.5)
INR PPP: 1.9 (ref 0.8–1.2)
LDH SERPL L TO P-CCNC: 264 U/L (ref 110–260)
LYMPHOCYTES # BLD AUTO: 1.1 K/UL (ref 1–4.8)
LYMPHOCYTES # BLD AUTO: 1.4 K/UL (ref 1–4.8)
LYMPHOCYTES NFR BLD: 10.4 % (ref 18–48)
LYMPHOCYTES NFR BLD: 17.7 % (ref 18–48)
MAGNESIUM SERPL-MCNC: 1.7 MG/DL (ref 1.6–2.6)
MCH RBC QN AUTO: 21.7 PG (ref 27–31)
MCH RBC QN AUTO: 22.1 PG (ref 27–31)
MCHC RBC AUTO-ENTMCNC: 29.7 G/DL (ref 32–36)
MCHC RBC AUTO-ENTMCNC: 30.7 G/DL (ref 32–36)
MCV RBC AUTO: 72 FL (ref 82–98)
MCV RBC AUTO: 73 FL (ref 82–98)
MODE: ABNORMAL
MONOCYTES # BLD AUTO: 0.7 K/UL (ref 0.3–1)
MONOCYTES # BLD AUTO: 0.8 K/UL (ref 0.3–1)
MONOCYTES NFR BLD: 7.8 % (ref 4–15)
MONOCYTES NFR BLD: 8.6 % (ref 4–15)
NEUTROPHILS # BLD AUTO: 5.6 K/UL (ref 1.8–7.7)
NEUTROPHILS # BLD AUTO: 8.3 K/UL (ref 1.8–7.7)
NEUTROPHILS NFR BLD: 71.7 % (ref 38–73)
NEUTROPHILS NFR BLD: 80.6 % (ref 38–73)
NRBC BLD-RTO: 0 /100 WBC
NRBC BLD-RTO: 0 /100 WBC
OVALOCYTES BLD QL SMEAR: ABNORMAL
PHOSPHATE SERPL-MCNC: 3.8 MG/DL (ref 2.7–4.5)
PLATELET # BLD AUTO: 217 K/UL (ref 150–450)
PLATELET # BLD AUTO: 236 K/UL (ref 150–450)
PLATELET BLD QL SMEAR: ABNORMAL
PMV BLD AUTO: 10.2 FL (ref 9.2–12.9)
PMV BLD AUTO: ABNORMAL FL (ref 9.2–12.9)
PO2 BLDA: 25 MMHG (ref 40–60)
POC SATURATED O2: 44 % (ref 95–100)
POCT GLUCOSE: 146 MG/DL (ref 70–110)
POCT GLUCOSE: 278 MG/DL (ref 70–110)
POCT GLUCOSE: 288 MG/DL (ref 70–110)
POCT GLUCOSE: 298 MG/DL (ref 70–110)
POCT GLUCOSE: 69 MG/DL (ref 70–110)
POIKILOCYTOSIS BLD QL SMEAR: SLIGHT
POLYCHROMASIA BLD QL SMEAR: ABNORMAL
POTASSIUM SERPL-SCNC: 3.5 MMOL/L (ref 3.5–5.1)
PROTHROMBIN TIME: 19.6 SEC (ref 9–12.5)
RBC # BLD AUTO: 3.3 M/UL (ref 4.6–6.2)
RBC # BLD AUTO: 3.51 M/UL (ref 4.6–6.2)
SAMPLE: ABNORMAL
SITE: ABNORMAL
SODIUM SERPL-SCNC: 136 MMOL/L (ref 136–145)
SPHEROCYTES BLD QL SMEAR: ABNORMAL
TARGETS BLD QL SMEAR: ABNORMAL
WBC # BLD AUTO: 10.31 K/UL (ref 3.9–12.7)
WBC # BLD AUTO: 7.81 K/UL (ref 3.9–12.7)

## 2024-09-26 PROCEDURE — 97535 SELF CARE MNGMENT TRAINING: CPT

## 2024-09-26 PROCEDURE — 83615 LACTATE (LD) (LDH) ENZYME: CPT | Performed by: PHYSICIAN ASSISTANT

## 2024-09-26 PROCEDURE — 63700000 PHARM REV CODE 250 ALT 637 W/O HCPCS: Performed by: PHYSICIAN ASSISTANT

## 2024-09-26 PROCEDURE — 20600001 HC STEP DOWN PRIVATE ROOM

## 2024-09-26 PROCEDURE — 27000248 HC VAD-ADDITIONAL DAY

## 2024-09-26 PROCEDURE — 99232 SBSQ HOSP IP/OBS MODERATE 35: CPT | Mod: ,,, | Performed by: STUDENT IN AN ORGANIZED HEALTH CARE EDUCATION/TRAINING PROGRAM

## 2024-09-26 PROCEDURE — 99900035 HC TECH TIME PER 15 MIN (STAT)

## 2024-09-26 PROCEDURE — 85730 THROMBOPLASTIN TIME PARTIAL: CPT | Performed by: PHYSICIAN ASSISTANT

## 2024-09-26 PROCEDURE — 80048 BASIC METABOLIC PNL TOTAL CA: CPT | Performed by: PHYSICIAN ASSISTANT

## 2024-09-26 PROCEDURE — 97162 PT EVAL MOD COMPLEX 30 MIN: CPT

## 2024-09-26 PROCEDURE — 25000003 PHARM REV CODE 250: Performed by: INTERNAL MEDICINE

## 2024-09-26 PROCEDURE — 63600175 PHARM REV CODE 636 W HCPCS: Performed by: PHYSICIAN ASSISTANT

## 2024-09-26 PROCEDURE — 99232 SBSQ HOSP IP/OBS MODERATE 35: CPT | Mod: ,,,

## 2024-09-26 PROCEDURE — 84100 ASSAY OF PHOSPHORUS: CPT | Performed by: PHYSICIAN ASSISTANT

## 2024-09-26 PROCEDURE — 99232 SBSQ HOSP IP/OBS MODERATE 35: CPT | Mod: ,,, | Performed by: NURSE PRACTITIONER

## 2024-09-26 PROCEDURE — 83735 ASSAY OF MAGNESIUM: CPT | Performed by: PHYSICIAN ASSISTANT

## 2024-09-26 PROCEDURE — 97165 OT EVAL LOW COMPLEX 30 MIN: CPT

## 2024-09-26 PROCEDURE — 85025 COMPLETE CBC W/AUTO DIFF WBC: CPT | Performed by: INTERNAL MEDICINE

## 2024-09-26 PROCEDURE — 85610 PROTHROMBIN TIME: CPT | Performed by: PHYSICIAN ASSISTANT

## 2024-09-26 PROCEDURE — 25000003 PHARM REV CODE 250: Performed by: PHYSICIAN ASSISTANT

## 2024-09-26 PROCEDURE — 63600175 PHARM REV CODE 636 W HCPCS: Performed by: INTERNAL MEDICINE

## 2024-09-26 RX ORDER — INSULIN ASPART 100 [IU]/ML
10 INJECTION, SOLUTION INTRAVENOUS; SUBCUTANEOUS
Status: DISCONTINUED | OUTPATIENT
Start: 2024-09-26 | End: 2024-09-29 | Stop reason: HOSPADM

## 2024-09-26 RX ADMIN — GABAPENTIN 100 MG: 100 CAPSULE ORAL at 09:09

## 2024-09-26 RX ADMIN — INSULIN ASPART 3 UNITS: 100 INJECTION, SOLUTION INTRAVENOUS; SUBCUTANEOUS at 08:09

## 2024-09-26 RX ADMIN — FLUCONAZOLE 400 MG: 100 TABLET ORAL at 09:09

## 2024-09-26 RX ADMIN — LEVETIRACETAM 1500 MG: 500 TABLET, FILM COATED ORAL at 08:09

## 2024-09-26 RX ADMIN — WARFARIN SODIUM 1.5 MG: 1 TABLET ORAL at 05:09

## 2024-09-26 RX ADMIN — AMLODIPINE BESYLATE 5 MG: 5 TABLET ORAL at 09:09

## 2024-09-26 RX ADMIN — HYDROCORTISONE ACETATE 25 MG: 25 SUPPOSITORY RECTAL at 09:09

## 2024-09-26 RX ADMIN — FUROSEMIDE 80 MG: 10 INJECTION, SOLUTION INTRAVENOUS at 09:09

## 2024-09-26 RX ADMIN — INSULIN GLARGINE 15 UNITS: 100 INJECTION, SOLUTION SUBCUTANEOUS at 08:09

## 2024-09-26 RX ADMIN — EPLERENONE 25 MG: 25 TABLET, FILM COATED ORAL at 09:09

## 2024-09-26 RX ADMIN — INSULIN GLARGINE 15 UNITS: 100 INJECTION, SOLUTION SUBCUTANEOUS at 09:09

## 2024-09-26 RX ADMIN — INSULIN ASPART 9 UNITS: 100 INJECTION, SOLUTION INTRAVENOUS; SUBCUTANEOUS at 11:09

## 2024-09-26 RX ADMIN — GABAPENTIN 400 MG: 400 CAPSULE ORAL at 08:09

## 2024-09-26 RX ADMIN — ATORVASTATIN CALCIUM 40 MG: 20 TABLET, FILM COATED ORAL at 09:09

## 2024-09-26 RX ADMIN — AMIODARONE HYDROCHLORIDE 200 MG: 200 TABLET ORAL at 09:09

## 2024-09-26 RX ADMIN — POTASSIUM CHLORIDE 30 MEQ: 750 CAPSULE, EXTENDED RELEASE ORAL at 09:09

## 2024-09-26 RX ADMIN — DULOXETINE HYDROCHLORIDE 30 MG: 30 CAPSULE, DELAYED RELEASE ORAL at 09:09

## 2024-09-26 RX ADMIN — FUROSEMIDE 80 MG: 10 INJECTION, SOLUTION INTRAVENOUS at 08:09

## 2024-09-26 RX ADMIN — INSULIN ASPART 9 UNITS: 100 INJECTION, SOLUTION INTRAVENOUS; SUBCUTANEOUS at 08:09

## 2024-09-26 RX ADMIN — PANTOPRAZOLE SODIUM 40 MG: 40 TABLET, DELAYED RELEASE ORAL at 09:09

## 2024-09-26 RX ADMIN — HYDROCORTISONE ACETATE 25 MG: 25 SUPPOSITORY RECTAL at 08:09

## 2024-09-26 RX ADMIN — CEFAZOLIN 6 G: 10 INJECTION, POWDER, FOR SOLUTION INTRAVENOUS at 08:09

## 2024-09-26 RX ADMIN — FUROSEMIDE 80 MG: 10 INJECTION, SOLUTION INTRAVENOUS at 05:09

## 2024-09-26 RX ADMIN — LEVETIRACETAM 1500 MG: 500 TABLET, FILM COATED ORAL at 09:09

## 2024-09-26 RX ADMIN — INSULIN ASPART 6 UNITS: 100 INJECTION, SOLUTION INTRAVENOUS; SUBCUTANEOUS at 11:09

## 2024-09-26 RX ADMIN — Medication 400 MG: at 09:09

## 2024-09-26 NOTE — PROGRESS NOTES
09/26/24 0515   Invasive Hemodynamic Monitoring   CVP (mmHg) 15 mmHg   SVO2 (%) 44 %     MD Alvarado made aware of above results. To monitor urine output.

## 2024-09-26 NOTE — PLAN OF CARE
Problem: Gastrointestinal Bleeding  Goal: Optimal Coping with Acute Illness  Outcome: Progressing  Intervention: Optimize Psychosocial Response  Flowsheets (Taken 9/26/2024 0259)  Supportive Measures:   active listening utilized   verbalization of feelings encouraged  Goal: Hemostasis  Outcome: Progressing  Intervention: Manage Gastrointestinal Bleeding  Flowsheets (Taken 9/26/2024 0259)  Bleeding Management: prepared for surgical intervention  Stabilization Measures: verbal stimulation provided  Environmental Support:   calm environment promoted   rest periods encouraged

## 2024-09-26 NOTE — PROGRESS NOTES
"[facility] - [facility] Stepdown  Endocrinology  Progress Note    Admit Date: 2024     Reason for Consult: Management of T2DM, Hyperglycemia      Surgical Procedure and Date: LVAD 2022      Diabetes diagnosis year:      Home Diabetes Medications:  Levemir 18 units twice daily and Novolog 10 units SSI (150/25) per patient  Last recs from recent admit,.   Lantus 12 units twice daily  - Novolog 8 units TID with meals  Add correction scale if needed.  Blood sugar 150 to 200 add 2 units  Blood sugar 201 to 250 add 4 units  Blood sugar 251 to 300 add 6 units  Blood sugar 301 to 350 add 8 units  Blood sugar greater than 350 add 10 units     How often checking glucose at home?  1-3x day  BG readings on regimen: 100s  Hypoglycemia on the regimen?  Not recently  Missed doses on regimen?  Yes     Diabetes Complications include:   Hyperglycemia     Complicating diabetes co morbidities:   History of MI, CHF, and CKD        HPI:   Patient is a 39 y.o. male with stage D CHF due to NICM, polysubstance abuse, DM, underwent DT-HM3 implantation 2022 with early RV failure requiring RVAD with ProTek Duo after admitted with ADHF/cardiogenic shock on home milrinone requiring IABP. He underwent RVAD removal and chest closure 2022.  Presented to the ED with c/o rectal bleeding.  Endocrine consulted for BG management              Interval HPI:   Overnight events: Remains in CSU. BG at or above goal ranges on current SQ insulin regimen. Diet Cardiac Cardiac (Low Na/Chol); Fluid - 2000mL    Eatin%  Nausea: No  Hypoglycemia and intervention: No  Fever: No  TPN and/or TF: No  If yes, type of TF/TPN and rate: n/a    BP (!) 86/0   Pulse 73   Temp 98.4 °F (36.9 °C) (Oral)   Resp 18   Ht 6' 3" (1.905 m)   Wt 74.5 kg (164 lb 3.9 oz)   SpO2 100%   BMI 20.53 kg/m²     Labs Reviewed and Include    Recent Labs   Lab 24  0409   *   CALCIUM 9.1      K 3.5   CO2 29   CL 96   BUN 13   CREATININE 1.0 " "    Lab Results   Component Value Date    WBC 7.81 09/26/2024    HGB 7.6 (L) 09/26/2024    HCT 25.6 (L) 09/26/2024    MCV 73 (L) 09/26/2024     09/26/2024     No results for input(s): "TSH", "FREET4" in the last 168 hours.  Lab Results   Component Value Date    HGBA1C 10.3 (H) 08/25/2024       Nutritional status:   Body mass index is 20.53 kg/m².  Lab Results   Component Value Date    ALBUMIN 2.3 (L) 09/25/2024    ALBUMIN 2.5 (L) 09/23/2024    ALBUMIN 2.3 (L) 09/17/2024     Lab Results   Component Value Date    PREALBUMIN 12 (L) 09/25/2024    PREALBUMIN 11 (L) 09/09/2024    PREALBUMIN 10 (L) 09/06/2024       Estimated Creatinine Clearance: 104.5 mL/min (based on SCr of 1 mg/dL).    Accu-Checks  Recent Labs     09/23/24  2045 09/24/24  0653 09/24/24  1201 09/24/24  1513 09/24/24  2035 09/25/24  0728 09/25/24  1049 09/25/24  1550 09/25/24  2107 09/26/24  0610   POCTGLUCOSE 263* 260* 297* 324* 384* 112* 94 85 212* 288*       Current Medications and/or Treatments Impacting Glycemic Control  Immunotherapy:    Immunosuppressants       None          Steroids:   Hormones (From admission, onward)      None          Pressors:    Autonomic Drugs (From admission, onward)      None          Hyperglycemia/Diabetes Medications:   Antihyperglycemics (From admission, onward)      Start     Stop Route Frequency Ordered    09/25/24 1130  insulin aspart U-100 pen 9 Units         -- SubQ 3 times daily with meals 09/25/24 0848    09/25/24 0900  insulin glargine U-100 (Lantus) pen 15 Units         -- SubQ 2 times daily 09/24/24 1745    09/23/24 1409  insulin aspart U-100 pen 0-10 Units         -- SubQ Before meals & nightly PRN 09/23/24 1309            ASSESSMENT and PLAN    Cardiac/Vascular  LVAD (left ventricular assist device) present  Managed by primary team  Optimize BG control      Endocrine  Type 2 diabetes mellitus with hyperglycemia  BG goal: 140-180  T2DM.  History of LVAD.      -Continue Lantus 15 units BID   -Increase " Novolog to 10 units TID with meals   - Novolog [facility] 150/25  - POCT Glucose before meals and at bedtime  - Hypoglycemia protocol in place      ** Please notify Endocrine for any change and/or advance in diet**  ** Please call Endocrine for any BG related issues **     Discharge Planning:   TBD. Please notify endocrinology prior to discharge.      GI  * Hematochezia  Managed per primary team  Avoid hypoglycemia        Rectal bleeding    Managed by primary team  Optimize BG control        Jody Starkey NP  Endocrinology  [facility] - [facility]

## 2024-09-26 NOTE — SUBJECTIVE & OBJECTIVE
Interval History: No fevers documented overnight. S/p scope yesterday - noted to have rectal ulcer. No further episodes of bloody stools today. Pt reports tolerating abx without issues.     Review of Systems   Constitutional:  Negative for chills and fever.   Gastrointestinal:  Negative for blood in stool.   All other systems reviewed and are negative.    Objective:     Vital Signs (Most Recent):  Temp: 98.1 °F (36.7 °C) (09/26/24 1107)  Pulse: 79 (09/26/24 1112)  Resp: 18 (09/26/24 1107)  BP: (!) 86/0 (09/26/24 0717)  SpO2: 100 % (09/26/24 1107) Vital Signs (24h Range):  Temp:  [97.6 °F (36.4 °C)-98.8 °F (37.1 °C)] 98.1 °F (36.7 °C)  Pulse:  [73-91] 79  Resp:  [16-25] 18  SpO2:  [94 %-100 %] 100 %  BP: ()/(0-79) 86/0     Weight:  (pt refuse)  Body mass index is 20.53 kg/m².    Estimated Creatinine Clearance: 104.5 mL/min (based on SCr of 1 mg/dL).     Physical Exam  Constitutional:       General: He is not in acute distress.     Appearance: He is not ill-appearing or toxic-appearing.   Eyes:      General:         Right eye: No discharge.         Left eye: No discharge.   Pulmonary:      Effort: Pulmonary effort is normal. No respiratory distress.   Abdominal:      Comments: LVAD dressed   Musculoskeletal:      Right lower leg: No edema.      Left lower leg: No edema.   Skin:     General: Skin is warm and dry.      Comments: R scl tunneled picc   Neurological:      Mental Status: He is alert and oriented to person, place, and time.          Significant Labs:   Microbiology Results (last 7 days)       ** No results found for the last 168 hours. **            Significant Imaging: I have reviewed all pertinent imaging results/findings within the past 24 hours.

## 2024-09-26 NOTE — PROGRESS NOTES
Visited patient at bedside. Patient is resting calmly and stated he does not need anything and feels ok after scope yesterday. VAD not connected to system monitor and patient laying on control at this time. Plan for team to continue to round on patient while inpatient.

## 2024-09-26 NOTE — PROGRESS NOTES
Admit Note     Met with patient to assess needs. Patient is a 39 y.o. single male, admitted for rectal bleeding with hx of acute on chronic combined systolic(congestive) and diastolic (congestive) heart failure, LVAD, Type 2 diabetes mellitus with hyperglycemia, with long term current use of insulin per medical records.     Pt received LVAD on 6/3/22.  Pt's mother does the dressing changes    Patient admitted to [facility] on 9/23/2024 .  Pt presents alone in room.  At this time, patient presents as alert and oriented x 4, good eye contact, cooperative, and asking and answering questions appropriately.     Household/Family Systems     Patient resides with patient's mother    Address:    Mother's address:  [address] [place], [place] [id number]    Significant other's address:   [address] [place], [place] [id number].      Patient phone number: [phone].  Pt reports that he lost his phone, possibly in the ambulance while being transported to [facility].  Pt reports that [phone] is his correct number.    Emergency contacts:  Malou ArmstrongTim, mother, [phone]- Pt reports staff can call his mother if they need to reach him after discharge  Darlyn (Page) Kyle, significant other: [phone]     Support system includes mother and significant other, and children  Pt has seven children, two with the pt's significant other.  The pt's other children live with their mothers.  Pt's family/mother care for children in the home when pt and significant other are not in attendance.       Patients primary caregiver is self with support from his mother and significant other when needed.    During admission, patient's caregiver plans to stay at home.  Confirmed patient and patients caregivers do not have access to reliable transportation.    Cognitive Status/Learning     Patient reports reading ability as college and states patient does not have difficulty with reading, writing, hearing, comprehension, learning, and memory.   Pt reports he continues to have the same difficulty with his eyesight.  Pt reports his eyesight issues do not affect his ability to drive.  Patient reports patient learns best by multiple learning styles.   Needed: No.   Highest education level: Attended College/Technical School    Vocation/Disability   .  Working for Income: No  If no, reason not working: Demands of Treatment  Patient used to work as a CNA.  Pt's mother receives monthly income and is able to assist pt financially.  Pt reports he has applied for disability and a  is assisting with this process.  Pt repors no financial difficulties at this time    Adherence     Patient reports a medium level of adherence to patients health care regimen.  Pt reports he had been off [employer] for about a week in the past due to transportation issues.  Pt reports he is a vegan.Adherence counseling and education provided. Patient verbalizes understanding.    Substance Use    Patient reports the following substance usage.    Tobacco: none, patient denies any use.  Alcohol: none, patient denies any use.  Illicit Drugs/Non-prescribed Medications: none, patient denies any use.  Patient states clear understanding of the potential impact of substance use.  Substance abstinence/cessation counseling, education and resources provided and reviewed.     Services Utilizing/ADLS    Infusion Service: Prior to admission, patient utilizing? yes.  Pt utilizes [facility] ([phone])  Home Health: Prior to admission, patient utilizing? yes.  Pt utilizes [facility] (phone: [phone]), fax: [phone])  DME: Prior to admission, no  Pulmonary/Cardiac Rehab: Prior to admission, no  Dialysis:  Prior to admission, no  Transplant Specialty Pharmacy:  Prior to admission, no.    Prior to admission, patient reports patient was independent with ADLS and was driving.  Patient reports patient is not able to care for self at this time due to compromised medical condition  (as documented in medical record) and physical weakness..  Patient indicates a willingness to care for self once medically cleared to do so.    Insurance/Medications    Insured by   Payer/Plan Subscr  Sex Relation Sub. Ins. ID Effective Group Num   1. MEDICAID - HE* JOSE J DAIMCO* 1985 Male Self [id number]* 3/1/20 [id number]                                   [address] [address]      Primary Insurance (for UNOS reporting): Public Insurance - Medicaid  Secondary Insurance (for UNOS reporting): None    Patient reports patient is able to obtain and afford medications at this time and at time of discharge.    Living Will/Healthcare Power of     Patient states patient has a LW and/or HCPA.  HPCA on file in EPIC.    Coping/Mental Health    Patient is coping adequately with the aid of  family members. Patient denies mental health difficulties. Pt has indicated mental health concerns in the past. Pt reports no mental health needs or ocncerns at this time.    Discharge Planning    At time of discharge, patient plans to return to his mother's home in [place] under the care of self with support form his mother.  Patients significant other will transport patient.  Per rounds today, expected discharge date has not been medically determined at this time. Patient and patients caregiver  verbalize understanding and are involved in treatment planning and discharge process.    Additional Concerns    Patient is being followed for needs, education, resources, information, emotional support, supportive counseling, and for supportive and skilled discharge plan of care.  providing ongoing psychosocial support, education, resources and d/c planning as needed.  SW remains available. Patient denies additional needs and/or concerns at this time. Patient verbalizes understanding and agreement with information reviewed, social work availability, and how to access available resources as needed.

## 2024-09-26 NOTE — PLAN OF CARE
Plan of care reviewed with patient. Call light within reach, fall precautions maintained bed alarm set. Patient made aware. Nurse instructed patient to call for assistance. Patient verbalized understanding. Telemetry monitor throughout shift. NAD noted. Will continue plan of care.         Problem: Adult Inpatient Plan of Care  Goal: Plan of Care Review  Outcome: Progressing  Goal: Patient-Specific Goal (Individualized)  Outcome: Progressing  Goal: Absence of Hospital-Acquired Illness or Injury  Outcome: Progressing  Goal: Optimal Comfort and Wellbeing  Outcome: Progressing  Goal: Readiness for Transition of Care  Outcome: Progressing     Problem: Diabetes Comorbidity  Goal: Blood Glucose Level Within Targeted Range  Outcome: Progressing     Problem: Sepsis/Septic Shock  Goal: Optimal Coping  Outcome: Progressing  Goal: Absence of Bleeding  Outcome: Progressing  Goal: Blood Glucose Level Within Targeted Range  Outcome: Progressing  Goal: Absence of Infection Signs and Symptoms  Outcome: Progressing  Goal: Optimal Nutrition Intake  Outcome: Progressing     Problem: Infection  Goal: Absence of Infection Signs and Symptoms  Outcome: Progressing     Problem: Coping Ineffective  Goal: Effective Coping  Outcome: Progressing     Problem: Fall Injury Risk  Goal: Absence of Fall and Fall-Related Injury  Outcome: Progressing     Problem: Ventricular Assist Device  Goal: Optimal Adjustment to Device  Outcome: Progressing  Goal: Absence of Bleeding  Outcome: Progressing  Goal: Absence of Embolism Signs and Symptoms  Outcome: Progressing  Goal: Optimal Blood Flow  Outcome: Progressing  Goal: Absence of Infection Signs and Symptoms  Outcome: Progressing  Goal: Effective Right-Sided Heart Function  Outcome: Progressing     Problem: Skin Injury Risk Increased  Goal: Skin Health and Integrity  Outcome: Progressing     Problem: Gastrointestinal Bleeding  Goal: Optimal Coping with Acute Illness  Outcome: Progressing  Goal:  Hemostasis  Outcome: Progressing

## 2024-09-26 NOTE — PT/OT/SLP EVAL
Occupational Therapy  Co Evaluation    Name: Kevan Queen  MRN: [id number]  Admitting Diagnosis: Hematochezia  Recent Surgery: Procedure(s) (LRB):  COLONOSCOPY (N/A)  EGD (ESOPHAGOGASTRODUODENOSCOPY) (N/A) 1 Day Post-Op  Pt is s/p LVAD 6/29/22  Hx of THC and ecstasy.     Recommendations:     Discharge Recommendations: Low Intensity Therapy      Assessment:     Kevan Queen is a 39 y.o. male with a medical diagnosis of Hematochezia.  Performance deficits affecting function: weakness, impaired endurance, impaired self care skills, impaired functional mobility, gait instability, impaired balance, decreased upper extremity function, decreased lower extremity function, pain.  Pt tolerated session fairly well limited by back pain. Pt may benefit from use of RW . Pt is not currently performing at functional baseline     Rehab Prognosis: Good; patient would benefit from acute skilled OT services to address these deficits and reach maximum level of function.       Plan:     Patient to be seen   to address the above listed problems via self-care/home management, therapeutic activities, therapeutic exercises  Plan of Care Expires:    Plan of Care Reviewed with: patient    Subjective     Pt agreeable to therapy.   Pt shouting in pain at times.     Occupational Profile:  Pt reports he recently moved in with his mother and 2 brothers to a 2 story home with bed/bath on second floor. Home has 2 MILY.   Pt reports he stays on LVAD battery power all times and is able to switch his batteries independently. He has assist for dressing change.   He does not drive and will have assist from family at all times.       Pain/Comfort:  Pain Rating 1: 10/10  Location 1: back  Pain Addressed 1: Reposition, Distraction    Patients cultural, spiritual, Jehovah's witness conflicts given the current situation: no    Objective:     Communicated with: nskareem  prior to session.  Patient found in bed with tele, LVAD to wall power and IV  Coeval  completed this date to optimize functional performance and safety   General Precautions: Standard,  fall, LVAD   Occupational Performance:    Bed Mobility:    Rolling, supine<>sit with SBA. Increased time.     Functional Mobility/Transfers:  Sit>stand with MOD A x 2   Slow rise and limited by reports of pain.     Activities of Daily Living:  Feeding: independent  G/h seated simulated. Did not perform in standing due to impaired standing balance related to pain.   LE dressing: MOD A to manage footwear also limited by pain     Cognitive/Visual Perceptual:  Pt awake, alert and following commands.     Physical Exam  Pt is right hand dominant and demo WFL UE strength/ROM, coordination and sensation.     AMPA 6 Click ADL:  AMPAC Total Score: 16    Treatment & Education:  Pt demo SBA for postural control seated EOB. B UE support in standing and during steps requiring MIN A x 2 person.   Education provided re: importance of OOB activity, but pt declined t/f to chair. Education provided re: nsg assist for all mobility/ADL skills  at this time due to decrease fall risk and pt verb understanding   Patient left supine with all lines intact and call button in reach    GOALS:   Multidisciplinary Problems       Occupational Therapy Goals              Problem: Occupational Therapy  Goal: Occupational Therapy Goal  Description: Goals to be met by: 7 days 10/3/24     Patient will increase functional independence with ADLs by performing:    Pt to complete UE dressing with set-0up  Pt to complete LE dressing with SBA  Pt to complete standing g/h skills with SBA  Pt to complete toileting with SBA  PT to complete t/f bed, chair and commode with SBA  Outcome: Progressing                    History:     Past Medical History:   Diagnosis Date    Acute respiratory failure with hypoxia 07/22/2023    Arthritis     Awareness alteration, transient 09/01/2022    Cardiomyopathy     CHF (congestive heart failure) 10/01/2020    COVID-19 06/03/2023     Diabetes mellitus     Dilated cardiomyopathy 10/26/2020    Drug abuse 10/2020    Headache 04/19/2023    Hyperglycemia 12/16/2022    Hyperosmolar hyperglycemic state (HHS) 05/25/2022    ICD (implantable cardioverter-defibrillator) in place 10/26/2020    Infected defibrillator now s/p removal Nov 2023 07/23/2023    Left ventricular assist device (LVAD) complication, initial encounter 06/05/2023    Muscle cramping 06/15/2022    Renal disorder     SOB (shortness of breath) 06/13/2022    Tingling in extremities 07/13/2022         Past Surgical History:   Procedure Laterality Date    APPLICATION OF WOUND VACUUM-ASSISTED CLOSURE DEVICE N/A 6/30/2022    Procedure: APPLICATION, WOUND VAC;  Surgeon: Luis F Paige MD;  [facility];  Service: Cardiovascular;  Laterality: N/A;  50 x 5 cm    CARDIAC DEFIBRILLATOR PLACEMENT      COLONOSCOPY N/A 9/25/2024    Procedure: COLONOSCOPY;  Surgeon: Drake Barton MD;  Location: [facility] (2ND FLR);  Service: Endoscopy;  Laterality: N/A;    ECHOCARDIOGRAM,TRANSESOPHAGEAL  11/9/2023    Procedure: Transesophageal echo (GIULLERMO) intra-procedure log documentation;  Surgeon: Eron Ivy MD;  Location: [facility] EP LAB;  Service: Cardiology;;    ESOPHAGOGASTRODUODENOSCOPY N/A 9/25/2024    Procedure: EGD (ESOPHAGOGASTRODUODENOSCOPY);  Surgeon: Drake Barton MD;  Location: [facility] ENDO (2ND FLR);  Service: Endoscopy;  Laterality: N/A;    EXTRACTION, ELECTRODE LEAD Left 11/9/2023    Procedure: EXTRACTION, ELECTRODE LEAD;  Surgeon: ADALID Leon MD;  Location: [facility] EP LAB;  Service: Cardiology;  Laterality: Left;  INFECTION, LEAD EXTRACTION, GUILLERMO, BSCI, ANES, EH,   *NO CTS BACKUP*    IMPLANTATION OF RIGHT VENTRICULAR ASSIST DEVICE (RVAD) N/A 6/29/2022    Procedure: INSERTION, RVAD;  Surgeon: Luis F Paige MD;  Location: [facility] OR [facility] FLR;  Service: Cardiovascular;  Laterality: N/A;    INSERTION OF GRAFT TO PERICARDIUM Right 6/30/2022    Procedure: INSERTION-RIGHT  VENTRICULAR ASSIST DEVICE;  Surgeon: Luis F Paige MD;  Location: [facility] OR [facility] FLR;  Service: Cardiovascular;  Laterality: Right;    IRRIGATION OF MEDIASTINUM  6/30/2022    Procedure: IRRIGATION, MEDIASTINUM;  Surgeon: Luis F Paige MD;  Location: [facility] OR [facility] FLR;  Service: Cardiovascular;;    LEFT VENTRICULAR ASSIST DEVICE Left 6/23/2022    Procedure: INSERTION-LEFT VENTRICULAR ASSIST DEVICE;  Surgeon: Luis F Paige MD;  Location: [facility] OR [facility] FLR;  Service: Cardiovascular;  Laterality: Left;    LEFT VENTRICULAR ASSIST DEVICE N/A 6/29/2022    Procedure: INSERTION-LEFT VENTRICULAR ASSIST DEVICE;  Surgeon: Luis F Paige MD;  Location: [facility] OR [facility] FLR;  Service: Cardiovascular;  Laterality: N/A;    REVISION OF SKIN POCKET FOR CARDIOVERTER-DEFIBRILLATOR  11/9/2023    Procedure: REVISION, SKIN POCKET, FOR CARDIOVERTER-DEFIBRILLATOR;  Surgeon: ADALID Leon MD;  Location: [facility] EP LAB;  Service: Cardiology;;    RIGHT HEART CATHETERIZATION Right 4/8/2022    Procedure: INSERTION, CATHETER, RIGHT HEART;  Surgeon: Luca Lopez Jr., MD;  Location: [facility] CATH LAB;  Service: Cardiology;  Laterality: Right;    RIGHT HEART CATHETERIZATION Right 4/19/2022    Procedure: INSERTION, CATHETER, RIGHT HEART;  Surgeon: Josh Pulido MD;  Location: [facility] CATH LAB;  Service: Cardiology;  Laterality: Right;    RIGHT HEART CATHETERIZATION Right 7/21/2022    Procedure: INSERTION, CATHETER, RIGHT HEART;  Surgeon: Dalia Crum MD;  Location: [facility] CATH LAB;  Service: Cardiology;  Laterality: Right;    RIGHT HEART CATHETERIZATION Right 10/31/2022    Procedure: INSERTION, CATHETER, RIGHT HEART;  Surgeon: Dalia Crum MD;  Location: [facility] CATH LAB;  Service: Cardiology;  Laterality: Right;    STERNAL WOUND CLOSURE N/A 6/30/2022    Procedure: CLOSURE, WOUND, STERNUM;  Surgeon: Luis F Paige MD;  [facility];  Service: Cardiovascular;  Laterality: N/A;       Time Tracking:     OT Date of Treatment:  09/26/24  OT Start Time: 1049  OT Stop Time: 1106  OT Total Time (min): 17 min    Billable Minutes:Evaluation 9  Self Care/Home Management 8    9/26/2024

## 2024-09-26 NOTE — SUBJECTIVE & OBJECTIVE
Subjective:     Interval History: EGD/colonoscopy done yesterday. A few ulcers in the rectum and a single ulcer in the distal rectum with visible vessel and oozing blood were found and clips were placed. Hgb stable this morning at 7.6. Denies any further bleeding. No complaints or concerns voiced by patient. Denies N/V/D.       Objective:     Vital Signs (Most Recent):  Temp: 98.4 °F (36.9 °C) (09/26/24 0717)  Pulse: 73 (09/26/24 0717)  Resp: 18 (09/26/24 0717)  BP: (!) 86/0 (09/26/24 0717)  SpO2: 100 % (09/26/24 0717) Vital Signs (24h Range):  Temp:  [97.6 °F (36.4 °C)-98.8 °F (37.1 °C)] 98.4 °F (36.9 °C)  Pulse:  [73-91] 73  Resp:  [16-25] 18  SpO2:  [94 %-100 %] 100 %  BP: ()/(0-79) 86/0     Weight:  (pt refuse) (09/26/24 0820)  Body mass index is 20.53 kg/m².      Intake/Output Summary (Last 24 hours) at 9/26/2024 0856  Last data filed at 9/26/2024 0528  Gross per 24 hour   Intake 1511.08 ml   Output 1460 ml   Net 51.08 ml       Lines/Drains/Airways       Central Venous Catheter Line  Duration             Tunneled Central Line - Double Lumen  09/08/24 1419 Internal Jugular Right 17 days              Line  Duration                  VAD 06/29/22 1115 Left ventricular assist device HeartMate 3 819 days              Peripheral Intravenous Line  Duration                  Peripheral IV - Single Lumen 09/25/24 1144 20 G Left Antecubital <1 day                     Physical Exam     Significant Labs:  Recent Lab Results  (Last 5 results in the past 24 hours)        09/26/24  0610   09/26/24  0441   09/26/24  0409   09/25/24  2107   09/25/24  1819        Allens Test   N/A             Anion Gap     11           Aniso     Slight           PTT     34.7  Comment: Refer to local heparin nomogram for intensity/dose specific   therapeutic   range.             Baso #     0.06     0.06       Basophil %     0.8     0.4       Site   [facility]             BUN     13           Calcium     9.1           Chloride     96            CO2     29           Creatinine     1.0           [facility]   Room Air             Differential Method     Automated     Automated       eGFR     >60.0           Eos #     0.1     0.1       Eos %     0.9     0.4       Glucose     166           Gran # (ANC)     5.6     12.4       Gran %     71.7     82.8       Hematocrit     25.6     27.3       Hemoglobin     7.6     8.1       Hypo     Occasional           Immature Grans (Abs)     0.02  Comment: Mild elevation in immature granulocytes is non specific and   can be seen in a variety of conditions including stress response,   acute inflammation, trauma and pregnancy. Correlation with other   laboratory and clinical findings is essential.       0.07  Comment: Mild elevation in immature granulocytes is non specific and   can be seen in a variety of conditions including stress response,   acute inflammation, trauma and pregnancy. Correlation with other   laboratory and clinical findings is essential.         Immature Granulocytes     0.3     0.5       INR     1.9  Comment: Coumadin Therapy:  2.0 - 3.0 for INR for all indicators except mechanical heart valves  and antiphospholipid syndromes which should use 2.5 - 3.5.             Lactate Dehydrogenase     264  Comment: Results are increased in hemolyzed samples.           Lymph #     1.4     1.6       Lymph %     17.7     10.6       Magnesium      1.7           MCH     21.7     21.4       MCHC     29.7     29.7       MCV     73     72       Mode   SPONT             Mono #     0.7     0.8       Mono %     8.6     5.3       MPV     SEE COMMENT  Comment: Result not available.     10.2       nRBC     0     0       Ovalocytes     Occasional           Phosphorus Level     3.8           Platelet Estimate     Appears normal           Platelet Count     217     227       POC PO2   25             POC SATURATED O2   44             POCT Glucose 288       212         Poikilocytosis     Slight           Poly     Occasional            Potassium     3.5           PT     19.6           RBC     3.51     3.79       RDW     26.8     26.9       Sample   VENOUS             Sodium     136           Spherocytes     Occasional           Target Cells     Occasional           WBC     7.81     14.96                                Significant Imaging:  Imaging results within the past 24 hours have been reviewed.

## 2024-09-26 NOTE — PROGRESS NOTES
09/26/2024  John Alaniz    Current provider:  Dalia Crum MD    Device interrogation:      9/26/2024    12:46 PM 9/26/2024     8:14 AM 9/26/2024     4:41 AM 9/26/2024    12:24 AM 9/25/2024     9:00 PM 9/25/2024     6:40 PM 9/25/2024     3:45 PM   TXP LVAD INTERROGATIONS   Type HeartMate3 HeartMate3 HeartMate3 HeartMate3 HeartMate3 HeartMate3 HeartMate3   Flow 4.2 4.4 4.1 3.9 3.7 3.6 3.7   Speed 5100 5100 5100 5100 5100 5100 5100   PI 4.9 4.7 4.8 6.1 5.4 5.5 5.7   Power (Serna) 3.5 3.5 3.6 3.6 3.6 3.5 3.5   LSL 4700 4700        Pulsatility   Intermittent pulse Intermittent pulse Intermittent pulse Intermittent pulse No Pulse          Rounded on Kevan Queen to ensure all mechanical assist device settings (IABP or VAD) were appropriate and all parameters were within limits.  I was able to ensure all back up equipment was present, the staff had no issues, and the Perfusion Department daily rounding was complete.      For implantable VADs: Interrogation of Ventricular assist device was performed with analysis of device parameters and review of device function. I have personally reviewed the interrogation findings and agree with findings as stated.     In emergency, the nursing units have been notified to contact the perfusion department either by:  Calling [phone] from 630am to 4pm Mon thru Fri, utilizing the On-Call Finder functionality of Epic and searching for Perfusion, or by contacting the hospital  from 4pm to 630am and on weekends and asking to speak with the perfusionist on call.    2:56 PM

## 2024-09-26 NOTE — SUBJECTIVE & OBJECTIVE
"Interval HPI:   Overnight events: Remains in CSU. BG at or above goal ranges on current SQ insulin regimen. Diet Cardiac Cardiac (Low Na/Chol); Fluid - 2000mL    Eatin%  Nausea: No  Hypoglycemia and intervention: No  Fever: No  TPN and/or TF: No  If yes, type of TF/TPN and rate: n/a    BP (!) 86/0   Pulse 73   Temp 98.4 °F (36.9 °C) (Oral)   Resp 18   Ht 6' 3" (1.905 m)   Wt 74.5 kg (164 lb 3.9 oz)   SpO2 100%   BMI 20.53 kg/m²     Labs Reviewed and Include    Recent Labs   Lab 24  0409   *   CALCIUM 9.1      K 3.5   CO2 29   CL 96   BUN 13   CREATININE 1.0     Lab Results   Component Value Date    WBC 7.81 2024    HGB 7.6 (L) 2024    HCT 25.6 (L) 2024    MCV 73 (L) 2024     2024     No results for input(s): "TSH", "FREET4" in the last 168 hours.  Lab Results   Component Value Date    HGBA1C 10.3 (H) 2024       Nutritional status:   Body mass index is 20.53 kg/m².  Lab Results   Component Value Date    ALBUMIN 2.3 (L) 2024    ALBUMIN 2.5 (L) 2024    ALBUMIN 2.3 (L) 2024     Lab Results   Component Value Date    PREALBUMIN 12 (L) 2024    PREALBUMIN 11 (L) 2024    PREALBUMIN 10 (L) 2024       Estimated Creatinine Clearance: 104.5 mL/min (based on SCr of 1 mg/dL).    Accu-Checks  Recent Labs     24  2045 24  0653 24  1201 24  1513 24  2035 24  0728 24  1049 24  1550 24  2107 24  0610   POCTGLUCOSE 263* 260* 297* 324* 384* 112* 94 85 212* 288*       Current Medications and/or Treatments Impacting Glycemic Control  Immunotherapy:    Immunosuppressants       None          Steroids:   Hormones (From admission, onward)      None          Pressors:    Autonomic Drugs (From admission, onward)      None          Hyperglycemia/Diabetes Medications:   Antihyperglycemics (From admission, onward)      Start     Stop Route Frequency Ordered    24 1130  " insulin aspart U-100 pen 9 Units         -- SubQ 3 times daily with meals 09/25/24 0848    09/25/24 0900  insulin glargine U-100 (Lantus) pen 15 Units         -- SubQ 2 times daily 09/24/24 1745    09/23/24 1409  insulin aspart U-100 pen 0-10 Units         -- SubQ Before meals & nightly PRN 09/23/24 1307

## 2024-09-26 NOTE — CARE UPDATE
Attempted to see patient this morning. Patient in sterile VAD dressing change. GOC clear at this time. Patient desires to proceed with all treatment measures. Palliative Medicine will sign off at this time. Please call for any additional palliative needs. Thank you for consulting Palliative Medicine. I will assist with coordinating outpatient follow-up visit with Palliative Medicine clinic.              Carly Lowry DNP, APRN, FNP-C  Department of Palliative Medicine  [facility]  [phone]

## 2024-09-26 NOTE — ASSESSMENT & PLAN NOTE
Kevan Queen is a 39 y.o. male with history of HF with LVAD and warfarin use presenting with GI bleeding. Underwent EGD/colonoscopy done yesterday. A few ulcers in the rectum and a single ulcer in the distal rectum with visible vessel and oozing blood were found and clips were placed.     Problem List:     Hematochezia     Recommendations:   - Return patient to [facility] [place] for ongoing care.                          - Monitor CBC, transfuse hgb < 7                          - Continue present medications.                          - Repeat colonoscopy at age 45 for screening                          purposes.                          - If he has ongoing rectal bleeding, would repeat                          colonoscopy for additional treatment                           -We will sign off please call us back with any acute problems or new bleeding.     Thank you for involving us in the care of Kevan Queen. Please call with any additional questions, concerns or changes in the patient's clinical status.

## 2024-09-26 NOTE — PT/OT/SLP EVAL
Physical Therapy Co-Evaluation with OT     Patient Name:  Kevan Queen   MRN:  [id number]    Co-evaluation with OT performed due to patient complexity and deficits, requiring two skilled therapists to appropriately and safely assess patient's strength and endurance while facilitating functional tasks in addition to accommodating for patient's activity tolerance.    Recommendations:     Discharge Recommendations: Moderate Intensity Therapy   Discharge Equipment Recommendations: walker, rolling   Barriers to discharge: None    Assessment:     Kevan Queen is a 39 y.o. male admitted with a medical diagnosis of Hematochezia.  He presents with the following impairments/functional limitations: weakness, impaired endurance, impaired self care skills, impaired functional mobility, gait instability, decreased lower extremity function, pain, impaired cardiopulmonary response to activity. Patient agreeable to participation however reported 10/10 LBP impacting mobility performance. He was able to demonstrate bed mobility with SBA and increased time however required increased assistance for sit to stand transfer and ambulation. Planning to trial ambulation with walker at subsequent visit to assist with stability and safety. Overall patient is making progress toward goals and remains motivated.  Based on patient's prior level of function compared to recent decline in mobility and current presentation, they would benefit from moderate intensity post-acute therapy to address deficits and continue progressing patient towards goals.   Patient is safe to ambulate/transfer with nursing (assist of 1), encouraged to sit up in chair when able.       Rehab Prognosis: Good; patient would benefit from acute skilled PT services to address these deficits and reach maximum level of function.    Recent Surgery: Procedure(s) (LRB):  COLONOSCOPY (N/A)  EGD (ESOPHAGOGASTRODUODENOSCOPY) (N/A) 1 Day Post-Op    Plan:     During  this hospitalization, patient to be seen 4 x/week to address the identified rehab impairments via gait training, therapeutic activities, therapeutic exercises, neuromuscular re-education and progress toward the following goals:    Plan of Care Expires:  10/24/24    Subjective     Chief Complaint: back pain  Patient/Family Comments/goals: walk  Pain/Comfort:  Pain Rating 1: 10/10  Location - Side 1: Bilateral  Location - Orientation 1: lower  Location 1: back  Pain Addressed 1: Reposition, Distraction  Pain Rating Post-Intervention 1: 10/10    Patients cultural, spiritual, Restoration conflicts given the current situation: no    Living Environment:  Patient lives in 2SH with mom, 2 brothers, has bed and bathroom on 2nd floor.  Prior to admission, patients level of function was independent.  Equipment used at home: none.  DME owned (not currently used): none.  Upon discharge, patient will have assistance from family- mom assists with dressing changes. Patient remains connected to battery power 24/7 and does not use wall power for LVAD.     Objective:     Communicated with RN prior to session.  Patient found HOB elevated with telemetry, LVAD, central line  upon PT entry to room.    General Precautions: Standard, fall, LVAD  Orthopedic Precautions:N/A   Braces: N/A  Respiratory Status: Room air    Exams:  Sensation:    -       Intact  RLE ROM: WNL  RLE Strength: WNL  LLE ROM: WNL  LLE Strength: WNL    Functional Mobility:  Bed Mobility:     Scooting: stand by assistance  Supine to Sit: stand by assistance  Increased time  Sit to Supine: stand by assistance  Transfers:     Sit to Stand:  moderate assistance and of 2 persons with hand-held assist  Increased time  Gait: patient ambulated 15' with minx2 with HHA  Deviations Noted: decreased gait speed, decreased step height R,L, and decreased step length R, L   Balance: fair      AM-PAC 6 CLICK MOBILITY  Total Score:18       Treatment & Education:  Education: patient  educated on POC, need for therapy, safety with mobility, discharge recommendations and equipment recommendations. Patient verbalized understanding of all topics.   Patient educated on importance of continued mobility with between session exercises given by therapist, sitting up in chair daily when able; emphasis on decreasing risk of deconditioning during hospital stay and improving overall function and wellbeing.      Patient left HOB elevated with all lines intact and call button in reach.    GOALS:   Multidisciplinary Problems       Physical Therapy Goals          Problem: Physical Therapy    Goal Priority Disciplines Outcome Goal Variances Interventions   Physical Therapy Goal     PT, PT/OT Progressing     Description: Goals to be met by: 10/24/24     Patient will increase functional independence with mobility by performin. Supine to sit with Starr  2. Sit to supine with Starr  3. Sit to stand transfer with Starr  4. Bed to chair transfer with Starr using Rolling Walker  5. Gait  x 250 feet with Modified Starr using Rolling Walker.   6. Ascend/descend 12 stair with bilateral Handrails Starr using No Assistive Device.     The mobility limitation cannot be sufficiently resolved by the use of a cane.   Patient's functional mobility deficit can be sufficiently resolved with the use of a Rolling Walker.  Patient's mobility limitation significantly impairs their ability to participate in one of more activities of daily living.  The use of a Rolling Walker will significantly improve the patient's ability to participate in ADLS and the patient will use it on regular basis in the home.                           History:     Past Medical History:   Diagnosis Date    Acute respiratory failure with hypoxia 2023    Arthritis     Awareness alteration, transient 2022    Cardiomyopathy     CHF (congestive heart failure) 10/01/2020    COVID-19 2023    Diabetes  mellitus     Dilated cardiomyopathy 10/26/2020    Drug abuse 10/2020    Headache 04/19/2023    Hyperglycemia 12/16/2022    Hyperosmolar hyperglycemic state (HHS) 05/25/2022    ICD (implantable cardioverter-defibrillator) in place 10/26/2020    Infected defibrillator now s/p removal Nov 2023 07/23/2023    Left ventricular assist device (LVAD) complication, initial encounter 06/05/2023    Muscle cramping 06/15/2022    Renal disorder     SOB (shortness of breath) 06/13/2022    Tingling in extremities 07/13/2022       Past Surgical History:   Procedure Laterality Date    APPLICATION OF WOUND VACUUM-ASSISTED CLOSURE DEVICE N/A 6/30/2022    Procedure: APPLICATION, WOUND VAC;  Surgeon: Luis F Paige MD;  [facility];  Service: Cardiovascular;  Laterality: N/A;  50 x 5 cm    CARDIAC DEFIBRILLATOR PLACEMENT      COLONOSCOPY N/A 9/25/2024    Procedure: COLONOSCOPY;  Surgeon: Drake Barton MD;  Location: [facility] (2ND FLR);  Service: Endoscopy;  Laterality: N/A;    ECHOCARDIOGRAM,TRANSESOPHAGEAL  11/9/2023    Procedure: Transesophageal echo (GUILLERMO) intra-procedure log documentation;  Surgeon: Eron Ivy MD;  Location: [facility] EP LAB;  Service: Cardiology;;    ESOPHAGOGASTRODUODENOSCOPY N/A 9/25/2024    Procedure: EGD (ESOPHAGOGASTRODUODENOSCOPY);  Surgeon: Drake Barton MD;  Location: [facility] ENDO (2ND FLR);  Service: Endoscopy;  Laterality: N/A;    EXTRACTION, ELECTRODE LEAD Left 11/9/2023    Procedure: EXTRACTION, ELECTRODE LEAD;  Surgeon: ADALID Leon MD;  Location: [facility] EP LAB;  Service: Cardiology;  Laterality: Left;  INFECTION, LEAD EXTRACTION, GUILLERMO, BSCI, ANES, EH,   *NO CTS BACKUP*    IMPLANTATION OF RIGHT VENTRICULAR ASSIST DEVICE (RVAD) N/A 6/29/2022    Procedure: INSERTION, RVAD;  Surgeon: Luis F Paige MD;  Location: [facility] F[facility];  Service: Cardiovascular;  Laterality: N/A;    INSERTION OF GRAFT TO PERICARDIUM Right 6/30/2022    Procedure: INSERTION-RIGHT VENTRICULAR ASSIST  DEVICE;  Surgeon: Luis F Paige MD;  [facility];  Service: Cardiovascular;  Laterality: Right;    IRRIGATION OF MEDIASTINUM  6/30/2022    Procedure: IRRIGATION, MEDIASTINUM;  Surgeon: Luis F Paige MD;  [facility];  Service: Cardiovascular;;    LEFT VENTRICULAR ASSIST DEVICE Left 6/23/2022    Procedure: INSERTION-LEFT VENTRICULAR ASSIST DEVICE;  Surgeon: Luis F Paige MD;  [facility];  Service: Cardiovascular;  Laterality: Left;    LEFT VENTRICULAR ASSIST DEVICE N/A 6/29/2022    Procedure: INSERTION-LEFT VENTRICULAR ASSIST DEVICE;  Surgeon: Luis F Paige MD;  [facility];  Service: Cardiovascular;  Laterality: N/A;    REVISION OF SKIN POCKET FOR CARDIOVERTER-DEFIBRILLATOR  11/9/2023    Procedure: REVISION, SKIN POCKET, FOR CARDIOVERTER-DEFIBRILLATOR;  Surgeon: ADALID Leon MD;  Location: [facility] EP LAB;  Service: Cardiology;;    RIGHT HEART CATHETERIZATION Right 4/8/2022    Procedure: INSERTION, CATHETER, RIGHT HEART;  Surgeon: Luca Lopez Jr., MD;  Location: [facility] CATH LAB;  Service: Cardiology;  Laterality: Right;    RIGHT HEART CATHETERIZATION Right 4/19/2022    Procedure: INSERTION, CATHETER, RIGHT HEART;  Surgeon: Josh Pulido MD;  Location: [facility] CATH LAB;  Service: Cardiology;  Laterality: Right;    RIGHT HEART CATHETERIZATION Right 7/21/2022    Procedure: INSERTION, CATHETER, RIGHT HEART;  Surgeon: Dalia Crum MD;  Location: [facility] CATH LAB;  Service: Cardiology;  Laterality: Right;    RIGHT HEART CATHETERIZATION Right 10/31/2022    Procedure: INSERTION, CATHETER, RIGHT HEART;  Surgeon: Dalia Crum MD;  Location: [facility] CATH LAB;  Service: Cardiology;  Laterality: Right;    STERNAL WOUND CLOSURE N/A 6/30/2022    Procedure: CLOSURE, WOUND, STERNUM;  Surgeon: Luis F Paige MD;  [facility];  Service: Cardiovascular;  Laterality: N/A;       Time Tracking:     PT Received On: 09/26/24  PT Start Time: 1049     PT  Stop Time: 1106  PT Total Time (min): 17 min     Billable Minutes: Evaluation 17 minutes      09/26/2024

## 2024-09-26 NOTE — SUBJECTIVE & OBJECTIVE
Interval History:  No events overnight or new complaints this AM. Stable from bleeding standpoint w/ stable H/H with INR up to 1.7.     Continuous Infusions:   0.9% NaCl   Intravenous Continuous         Scheduled Meds:   alteplase  2 mg Intra-Catheter Once    amiodarone  200 mg Oral Daily    amLODIPine  5 mg Oral Daily    atorvastatin  40 mg Oral Daily    ceFAZolin (ANCEF) 6 g in D5W 500 mL CONTINUOUS INFUSION  6 g Intravenous Q24H    DULoxetine  30 mg Oral Daily    eplerenone  25 mg Oral Daily    fluconazole  400 mg Oral Daily    furosemide (LASIX) injection  80 mg Intravenous TID    gabapentin  100 mg Oral Daily    And    gabapentin  400 mg Oral QHS    hydrocortisone  25 mg Rectal BID    insulin aspart U-100  9 Units Subcutaneous TIDWM    insulin glargine U-100  15 Units Subcutaneous BID    levETIRAcetam  1,500 mg Oral BID    magnesium oxide  400 mg Oral Daily    pantoprazole  40 mg Oral Daily    potassium chloride  30 mEq Oral Daily    sodium chloride 0.9%  250 mL Intravenous Once     PRN Meds:  Current Facility-Administered Medications:     0.9%  NaCl infusion (for blood administration), , Intravenous, Q24H PRN    0.9%  NaCl infusion (for blood administration), , Intravenous, Q24H PRN    acetaminophen, 650 mg, Oral, Q6H PRN    cyclobenzaprine, 5 mg, Oral, Daily PRN    dextrose 10%, 12.5 g, Intravenous, PRN    dextrose 10%, 12.5 g, Intravenous, PRN    dextrose 10%, 25 g, Intravenous, PRN    dextrose 10%, 25 g, Intravenous, PRN    glucagon (human recombinant), 1 mg, Intramuscular, PRN    glucagon (human recombinant), 1 mg, Intramuscular, PRN    glucose, 16 g, Oral, PRN    glucose, 24 g, Oral, PRN    HYDROmorphone, 0.2 mg, Intravenous, Q5 Min PRN    insulin aspart U-100, 0-10 Units, Subcutaneous, QID (AC + HS) PRN    meperidine, 12.5 mg, Intravenous, Once PRN    ondansetron, 4 mg, Oral, Q8H PRN    ondansetron, 4 mg, Intravenous, Daily PRN    Review of patient's allergies indicates:   Allergen Reactions    Torsemide  Hives     Objective:     Vital Signs (Most Recent):  Temp: 98.1 °F (36.7 °C) (09/26/24 1107)  Pulse: 79 (09/26/24 1112)  Resp: 18 (09/26/24 1107)  BP: (!) 80/0 (09/26/24 1254)  SpO2: 100 % (09/26/24 1107) Vital Signs (24h Range):  Temp:  [97.6 °F (36.4 °C)-98.8 °F (37.1 °C)] 98.1 °F (36.7 °C)  Pulse:  [73-91] 79  Resp:  [16-25] 18  SpO2:  [94 %-100 %] 100 %  BP: ()/(0-79) 80/0     Patient Vitals for the past 72 hrs (Last 3 readings):   Weight   09/23/24 1331 74.5 kg (164 lb 3.9 oz)     Body mass index is 20.53 kg/m².      Intake/Output Summary (Last 24 hours) at 9/26/2024 1326  Last data filed at 9/26/2024 1244  Gross per 24 hour   Intake 1190 ml   Output 2860 ml   Net -1670 ml       Hemodynamic Parameters:  CVP:  [15 mmHg] 15 mmHg           Physical Exam  Vitals reviewed.   HENT:      Head: Normocephalic.      Nose: Nose normal.   Eyes:      Conjunctiva/sclera: Conjunctivae normal.   Cardiovascular:      Rate and Rhythm: Normal rate and regular rhythm.      Comments: Smooth VAD hum   Pulmonary:      Effort: Pulmonary effort is normal.   Abdominal:      General: Abdomen is flat.      Tenderness: There is no abdominal tenderness.   Musculoskeletal:         General: Normal range of motion.      Cervical back: Normal range of motion.   Skin:     General: Skin is warm.      Capillary Refill: Capillary refill takes less than 2 seconds.   Neurological:      General: No focal deficit present.      Mental Status: He is alert.   Psychiatric:         Mood and Affect: Mood normal.         Behavior: Behavior normal.         Thought Content: Thought content normal.         Judgment: Judgment normal.          Significant Labs:  CBC:  Recent Labs   Lab 09/25/24  0630 09/25/24  1819 09/26/24  0409   WBC 11.45 14.96* 7.81   RBC 3.35* 3.79* 3.51*   HGB 6.9* 8.1* 7.6*   HCT 23.1* 27.3* 25.6*    227 217   MCV 69* 72* 73*   MCH 20.6* 21.4* 21.7*   MCHC 29.9* 29.7* 29.7*     BNP:  Recent Labs   Lab 09/25/24  0359   *      CMP:  Recent Labs   Lab 09/23/24  0205 09/24/24 0312 09/25/24  0359 09/26/24  0409   GLU  --  256* 139* 166*   CALCIUM 8.5 8.6* 8.7 9.1   ALBUMIN 2.5*  --  2.3*  --    PROT  --   --  7.7  --    * 132* 136 136   K 3.3* 3.1* 3.3* 3.5   CO2 28 30* 31* 29   CL 93* 95 95 96   BUN 13.0 13 10 13   CREATININE 1.33* 0.9 0.9 1.0   ALKPHOS 264*  --  230*  --    ALT 12  --  6*  --    AST 40*  --  28  --    BILITOT 1.3  --  2.0*  --       Coagulation:   Recent Labs   Lab 09/24/24 0313 09/25/24 0359 09/26/24  0409   INR 2.0* 1.9* 1.9*   APTT 37.0* 35.0* 34.7*     LDH:  Recent Labs   Lab 09/24/24 0313 09/25/24  0359 09/26/24  0409   * 255 264*     Microbiology:  Microbiology Results (last 7 days)       ** No results found for the last 168 hours. **            I have reviewed all pertinent labs within the past 24 hours.    Estimated Creatinine Clearance: 104.5 mL/min (based on SCr of 1 mg/dL).    Diagnostic Results:  I have reviewed all pertinent imaging results/findings within the past 24 hours.

## 2024-09-26 NOTE — PLAN OF CARE
Problem: Occupational Therapy  Goal: Occupational Therapy Goal  Description: Goals to be met by: 7 days 10/3/24     Patient will increase functional independence with ADLs by performing:    Pt to complete UE dressing with set-0up  Pt to complete LE dressing with SBA  Pt to complete standing g/h skills with SBA  Pt to complete toileting with SBA  PT to complete t/f bed, chair and commode with SBA  Outcome: Progressing

## 2024-09-26 NOTE — ASSESSMENT & PLAN NOTE
BG goal: 140-180  T2DM.  History of LVAD.      -Continue Lantus 15 units BID   -Increase Novolog to 10 units TID with meals   - Novolog /25  - POCT Glucose before meals and at bedtime  - Hypoglycemia protocol in place      ** Please notify Endocrine for any change and/or advance in diet**  ** Please call Endocrine for any BG related issues **     Discharge Planning:   TBD. Please notify endocrinology prior to discharge.     97.4

## 2024-09-26 NOTE — PROGRESS NOTES
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 S/p EGD/Cscope - found to have bleeding rectal ulcer s/p clip. No leukocytosis noted on lab work today, transient wbc noted post-procedure.     Recommendations:  -continue cefazolin 6g continuous infusion iv daily, maria isabel 10/9 for 6w course with transition back to suppressive cefadroxil after completion of iv abx  -continue fluconazole 400 mg daily, maria isabel 10/16 for 6w course from cleared blood culture   -monitor QTC and DDI  -please see prior opat note on 9/7              Thank you for your consult. I will follow-up with patient. Please contact us if you have any additional questions. Above d/w primary team.       Laura Baldwin MD  Infectious Disease  [facility] - Cardiology Stepdown    Subjective:     Principal Problem:Hematochezia    HPI: 38 yo male with HM3 as DT c/b recurrent DLESI with recent admission for MSSA DLESI/bacteremia and Cparapsilosis fungemia (maintained on prolong course of cefazolin/fluc) admitted for BRBPR. Pt reported having constipation prior to admission with associated straining with subsequent BRPBR. He denied fevers, chills, issues with PICC or IV abx. Recently restarted taking fluconazole after prescription was re-sent. Pt is currently on cefazolin and fluconazole. Plan for cscope tomorrow.    Interval  History: No fevers documented overnight. S/p scope yesterday - noted to have rectal ulcer. No further episodes of bloody stools today. Pt reports tolerating abx without issues.     Review of Systems   Constitutional:  Negative for chills and fever.   Gastrointestinal:  Negative for blood in stool.   All other systems reviewed and are negative.    Objective:     Vital Signs (Most Recent):  Temp: 98.1 °F (36.7 °C) (09/26/24 1107)  Pulse: 79 (09/26/24 1112)  Resp: 18 (09/26/24 1107)  BP: (!) 86/0 (09/26/24 0717)  SpO2: 100 % (09/26/24 1107) Vital Signs (24h Range):  Temp:  [97.6 °F (36.4 °C)-98.8 °F (37.1 °C)] 98.1 °F (36.7 °C)  Pulse:  [73-91] 79  Resp:  [16-25] 18  SpO2:  [94 %-100 %] 100 %  BP: ()/(0-79) 86/0     Weight:  (pt refuse)  Body mass index is 20.53 kg/m².    Estimated Creatinine Clearance: 104.5 mL/min (based on SCr of 1 mg/dL).     Physical Exam  Constitutional:       General: He is not in acute distress.     Appearance: He is not ill-appearing or toxic-appearing.   Eyes:      General:         Right eye: No discharge.         Left eye: No discharge.   Pulmonary:      Effort: Pulmonary effort is normal. No respiratory distress.   Abdominal:      Comments: LVAD dressed   Musculoskeletal:      Right lower leg: No edema.      Left lower leg: No edema.   Skin:     General: Skin is warm and dry.      Comments: R scl tunneled picc   Neurological:      Mental Status: He is alert and oriented to person, place, and time.          Significant Labs:   Microbiology Results (last 7 days)       ** No results found for the last 168 hours. **            Significant Imaging: I have reviewed all pertinent imaging results/findings within the past 24 hours.

## 2024-09-26 NOTE — ASSESSMENT & PLAN NOTE
Cparapsilosis fungemia    I independently reviewed patient's lab work and images as documented. 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 Plan for EGD/cscope today. No leukocytosis noted on lab work today.     Recommendations:  -continue cefazolin 6g continuous infusion iv daily, maria isabel 10/9 for 6w course with transition back to suppressive cefadroxil after completion of iv abx  -continue fluconazole 400 mg daily, maria isabel 10/16 for 6w course from cleared blood culture   -monitor QTC and DDI  -please see prior opat note on 9/7

## 2024-09-26 NOTE — PROGRESS NOTES
[facility]  Gastroenterology  Progress Note    Patient Name: Kevan Queen  MRN: [id number]  Admission Date: 9/23/2024  Hospital Length of Stay: 3 days  Code Status: Full Code   Attending Provider: Dalia Crum MD  Consulting Provider: FIGUEROA Kendrick  Primary Care Physician: Rosio Armendariz FNP  Principal Problem: Hematochezia        Subjective:     Interval History: EGD/colonoscopy done yesterday. A few ulcers in the rectum and a single ulcer in the distal rectum with visible vessel and oozing blood were found and clips were placed. Hgb stable this morning at 7.6. Denies any further bleeding. No complaints or concerns voiced by patient. Denies N/V/D.       Objective:     Vital Signs (Most Recent):  Temp: 98.4 °F (36.9 °C) (09/26/24 0717)  Pulse: 73 (09/26/24 0717)  Resp: 18 (09/26/24 0717)  BP: (!) 86/0 (09/26/24 0717)  SpO2: 100 % (09/26/24 0717) Vital Signs (24h Range):  Temp:  [97.6 °F (36.4 °C)-98.8 °F (37.1 °C)] 98.4 °F (36.9 °C)  Pulse:  [73-91] 73  Resp:  [16-25] 18  SpO2:  [94 %-100 %] 100 %  BP: ()/(0-79) 86/0     Weight:  (pt refuse) (09/26/24 0820)  Body mass index is 20.53 kg/m².      Intake/Output Summary (Last 24 hours) at 9/26/2024 0856  Last data filed at 9/26/2024 0528  Gross per 24 hour   Intake 1511.08 ml   Output 1460 ml   Net 51.08 ml       Lines/Drains/Airways       Central Venous Catheter Line  Duration             Tunneled Central Line - Double Lumen  09/08/24 1419 Internal Jugular Right 17 days              Line  Duration                  VAD 06/29/22 1115 Left ventricular assist device HeartMate 3 819 days              Peripheral Intravenous Line  Duration                  Peripheral IV - Single Lumen 09/25/24 1144 20 G Left Antecubital <1 day                   Physical Exam  Vitals and nursing note reviewed.   Constitutional:       General: He is not in acute distress.     Appearance: Normal appearance. He is not ill-appearing.   HENT:       Head: Normocephalic and atraumatic.   Eyes:      Extraocular Movements: Extraocular movements intact.   Pulmonary:      Effort: Pulmonary effort is normal. No respiratory distress.   Chest:      Comments: LVAD dressed   Abdominal:      General: There is no distension.      Tenderness: There is no guarding.   Musculoskeletal:         General: Normal range of motion.      Cervical back: Normal range of motion.   Skin:     General: Skin is warm and dry.   Neurological:      Mental Status: He is alert and oriented to person, place, and time.          Significant Labs:  Recent Lab Results  (Last 5 results in the past 24 hours)        09/26/24  0610   09/26/24  0441   09/26/24  0409   09/25/24  2107   09/25/24  1819        Allens Test   N/A             Anion Gap     11           Aniso     Slight           PTT     34.7  Comment: Refer to local heparin nomogram for intensity/dose specific   therapeutic   range.             Baso #     0.06     0.06       Basophil %     0.8     0.4       Site   Ehsan/UAC             BUN     13           Calcium     9.1           Chloride     96           CO2     29           Creatinine     1.0           DelSys   Room Air             Differential Method     Automated     Automated       eGFR     >60.0           Eos #     0.1     0.1       Eos %     0.9     0.4       Glucose     166           Gran # (ANC)     5.6     12.4       Gran %     71.7     82.8       Hematocrit     25.6     27.3       Hemoglobin     7.6     8.1       Hypo     Occasional           Immature Grans (Abs)     0.02  Comment: Mild elevation in immature granulocytes is non specific and   can be seen in a variety of conditions including stress response,   acute inflammation, trauma and pregnancy. Correlation with other   laboratory and clinical findings is essential.       0.07  Comment: Mild elevation in immature granulocytes is non specific and   can be seen in a variety of conditions including stress response,   acute  inflammation, trauma and pregnancy. Correlation with other   laboratory and clinical findings is essential.         Immature Granulocytes     0.3     0.5       INR     1.9  Comment: Coumadin Therapy:  2.0 - 3.0 for INR for all indicators except mechanical heart valves  and antiphospholipid syndromes which should use 2.5 - 3.5.             Lactate Dehydrogenase     264  Comment: Results are increased in hemolyzed samples.           Lymph #     1.4     1.6       Lymph %     17.7     10.6       Magnesium      1.7           MCH     21.7     21.4       MCHC     29.7     29.7       MCV     73     72       Mode   SPONT             Mono #     0.7     0.8       Mono %     8.6     5.3       MPV     SEE COMMENT  Comment: Result not available.     10.2       nRBC     0     0       Ovalocytes     Occasional           Phosphorus Level     3.8           Platelet Estimate     Appears normal           Platelet Count     217     227       POC PO2   25             POC SATURATED O2   44             POCT Glucose 288       212         Poikilocytosis     Slight           Poly     Occasional           Potassium     3.5           PT     19.6           RBC     3.51     3.79       RDW     26.8     26.9       Sample   VENOUS             Sodium     136           Spherocytes     Occasional           Target Cells     Occasional           WBC     7.81     14.96                                Significant Imaging:  Imaging results within the past 24 hours have been reviewed.  Assessment/Plan:     GI  * Hematochezia  Kevan Queen is a 39 y.o. male with history of HF with LVAD and warfarin use presenting with GI bleeding. Underwent EGD/colonoscopy done yesterday. A few ulcers in the rectum and a single ulcer in the distal rectum with visible vessel and oozing blood were found and clips were placed.     Problem List:     Hematochezia     Recommendations:   - Return patient to hospital [place] for ongoing care.                          - Monitor  CBC, transfuse hgb < 7                          - Continue present medications.                          - Repeat colonoscopy at age 45 for screening                          purposes.                          - If he has ongoing rectal bleeding, would repeat                          colonoscopy for additional treatment                           -We will sign off please call us back with any acute problems or new bleeding.     Thank you for involving us in the care of Kevan Queen. Please call with any additional questions, concerns or changes in the patient's clinical status.          After careful discussion with Dr. Barton, it has been decided for Gastroenterology to sign off on this patient. Please contact for any questions or concerns.    Thank you for your consult. I will sign off. Please contact us if you have any additional questions.    Teri Shah, ORALIA-C  Gastroenterology  [facility]

## 2024-09-26 NOTE — PLAN OF CARE
Problem: Physical Therapy  Goal: Physical Therapy Goal  Description: Goals to be met by: 10/24/24     Patient will increase functional independence with mobility by performin. Supine to sit with [place]  2. Sit to supine with [place]  3. Sit to stand transfer with [place]  4. Bed to chair transfer with [place] using Rolling Walker  5. Gait  x 250 feet with Modified [place] using Rolling Walker.   6. Ascend/descend 12 stair with bilateral Handrails [place] using No Assistive Device.     The mobility limitation cannot be sufficiently resolved by the use of a cane.   Patient's functional mobility deficit can be sufficiently resolved with the use of a Rolling Walker.  Patient's mobility limitation significantly impairs their ability to participate in one of more activities of daily living.  The use of a Rolling Walker will significantly improve the patient's ability to participate in ADLS and the patient will use it on regular basis in the home.      2024 1121 by Hilda Park, PT  Outcome: Progressing

## 2024-09-27 LAB
ALBUMIN SERPL BCP-MCNC: 2.5 G/DL (ref 3.5–5.2)
ALLENS TEST: ABNORMAL
ALP SERPL-CCNC: 269 U/L (ref 55–135)
ALT SERPL W/O P-5'-P-CCNC: 5 U/L (ref 10–44)
ANION GAP SERPL CALC-SCNC: 8 MMOL/L (ref 8–16)
APTT PPP: 32.8 SEC (ref 21–32)
AST SERPL-CCNC: 35 U/L (ref 10–40)
BASOPHILS # BLD AUTO: 0.04 K/UL (ref 0–0.2)
BASOPHILS NFR BLD: 0.5 % (ref 0–1.9)
BILIRUB DIRECT SERPL-MCNC: 0.9 MG/DL (ref 0.1–0.3)
BILIRUB SERPL-MCNC: 1.3 MG/DL (ref 0.1–1)
BNP SERPL-MCNC: 385 PG/ML (ref 0–99)
BUN SERPL-MCNC: 15 MG/DL (ref 6–20)
CALCIUM SERPL-MCNC: 9.2 MG/DL (ref 8.7–10.5)
CHLORIDE SERPL-SCNC: 97 MMOL/L (ref 95–110)
CO2 SERPL-SCNC: 29 MMOL/L (ref 23–29)
CREAT SERPL-MCNC: 1.1 MG/DL (ref 0.5–1.4)
CRP SERPL-MCNC: 20.1 MG/L (ref 0–8.2)
DELSYS: ABNORMAL
DIFFERENTIAL METHOD BLD: ABNORMAL
EOSINOPHIL # BLD AUTO: 0.1 K/UL (ref 0–0.5)
EOSINOPHIL NFR BLD: 0.6 % (ref 0–8)
ERYTHROCYTE [DISTWIDTH] IN BLOOD BY AUTOMATED COUNT: 26.9 % (ref 11.5–14.5)
EST. GFR  (NO RACE VARIABLE): >60 ML/MIN/1.73 M^2
GLUCOSE SERPL-MCNC: 182 MG/DL (ref 70–110)
HCT VFR BLD AUTO: 26.2 % (ref 40–54)
HGB BLD-MCNC: 7.5 G/DL (ref 14–18)
IMM GRANULOCYTES # BLD AUTO: 0.03 K/UL (ref 0–0.04)
IMM GRANULOCYTES NFR BLD AUTO: 0.4 % (ref 0–0.5)
INR PPP: 1.7 (ref 0.8–1.2)
LDH SERPL L TO P-CCNC: 251 U/L (ref 110–260)
LYMPHOCYTES # BLD AUTO: 1.2 K/UL (ref 1–4.8)
LYMPHOCYTES NFR BLD: 15.4 % (ref 18–48)
MAGNESIUM SERPL-MCNC: 1.7 MG/DL (ref 1.6–2.6)
MCH RBC QN AUTO: 21.1 PG (ref 27–31)
MCHC RBC AUTO-ENTMCNC: 28.6 G/DL (ref 32–36)
MCV RBC AUTO: 74 FL (ref 82–98)
MODE: ABNORMAL
MONOCYTES # BLD AUTO: 0.6 K/UL (ref 0.3–1)
MONOCYTES NFR BLD: 8 % (ref 4–15)
NEUTROPHILS # BLD AUTO: 6.1 K/UL (ref 1.8–7.7)
NEUTROPHILS NFR BLD: 75.1 % (ref 38–73)
NRBC BLD-RTO: 0 /100 WBC
PHOSPHATE SERPL-MCNC: 3.2 MG/DL (ref 2.7–4.5)
PLATELET # BLD AUTO: 237 K/UL (ref 150–450)
PMV BLD AUTO: 10.4 FL (ref 9.2–12.9)
PO2 BLDA: 25 MMHG (ref 40–60)
POC SATURATED O2: 43 % (ref 95–100)
POCT GLUCOSE: 298 MG/DL (ref 70–110)
POCT GLUCOSE: 346 MG/DL (ref 70–110)
POCT GLUCOSE: 363 MG/DL (ref 70–110)
POCT GLUCOSE: 85 MG/DL (ref 70–110)
POTASSIUM SERPL-SCNC: 3.7 MMOL/L (ref 3.5–5.1)
PREALB SERPL-MCNC: 14 MG/DL (ref 20–43)
PROT SERPL-MCNC: 8.3 G/DL (ref 6–8.4)
PROTHROMBIN TIME: 18.3 SEC (ref 9–12.5)
RBC # BLD AUTO: 3.55 M/UL (ref 4.6–6.2)
SAMPLE: ABNORMAL
SITE: ABNORMAL
SODIUM SERPL-SCNC: 134 MMOL/L (ref 136–145)
WBC # BLD AUTO: 8.05 K/UL (ref 3.9–12.7)

## 2024-09-27 PROCEDURE — 63700000 PHARM REV CODE 250 ALT 637 W/O HCPCS: Performed by: PHYSICIAN ASSISTANT

## 2024-09-27 PROCEDURE — 20600001 HC STEP DOWN PRIVATE ROOM

## 2024-09-27 PROCEDURE — 85730 THROMBOPLASTIN TIME PARTIAL: CPT | Performed by: PHYSICIAN ASSISTANT

## 2024-09-27 PROCEDURE — 83880 ASSAY OF NATRIURETIC PEPTIDE: CPT | Performed by: PHYSICIAN ASSISTANT

## 2024-09-27 PROCEDURE — 25000003 PHARM REV CODE 250: Performed by: INTERNAL MEDICINE

## 2024-09-27 PROCEDURE — 63600175 PHARM REV CODE 636 W HCPCS: Performed by: INTERNAL MEDICINE

## 2024-09-27 PROCEDURE — 85610 PROTHROMBIN TIME: CPT | Performed by: PHYSICIAN ASSISTANT

## 2024-09-27 PROCEDURE — 97116 GAIT TRAINING THERAPY: CPT

## 2024-09-27 PROCEDURE — 99900035 HC TECH TIME PER 15 MIN (STAT)

## 2024-09-27 PROCEDURE — 83735 ASSAY OF MAGNESIUM: CPT | Performed by: PHYSICIAN ASSISTANT

## 2024-09-27 PROCEDURE — 99232 SBSQ HOSP IP/OBS MODERATE 35: CPT | Mod: ,,, | Performed by: NURSE PRACTITIONER

## 2024-09-27 PROCEDURE — 25000003 PHARM REV CODE 250: Performed by: PHYSICIAN ASSISTANT

## 2024-09-27 PROCEDURE — 85025 COMPLETE CBC W/AUTO DIFF WBC: CPT | Performed by: INTERNAL MEDICINE

## 2024-09-27 PROCEDURE — 97530 THERAPEUTIC ACTIVITIES: CPT

## 2024-09-27 PROCEDURE — 63600175 PHARM REV CODE 636 W HCPCS: Performed by: PHYSICIAN ASSISTANT

## 2024-09-27 PROCEDURE — 80076 HEPATIC FUNCTION PANEL: CPT | Performed by: PHYSICIAN ASSISTANT

## 2024-09-27 PROCEDURE — 97535 SELF CARE MNGMENT TRAINING: CPT

## 2024-09-27 PROCEDURE — 86140 C-REACTIVE PROTEIN: CPT | Performed by: PHYSICIAN ASSISTANT

## 2024-09-27 PROCEDURE — 93750 INTERROGATION VAD IN PERSON: CPT | Mod: ,,, | Performed by: INTERNAL MEDICINE

## 2024-09-27 PROCEDURE — 84134 ASSAY OF PREALBUMIN: CPT | Performed by: PHYSICIAN ASSISTANT

## 2024-09-27 PROCEDURE — 83615 LACTATE (LD) (LDH) ENZYME: CPT | Performed by: PHYSICIAN ASSISTANT

## 2024-09-27 PROCEDURE — 27000248 HC VAD-ADDITIONAL DAY

## 2024-09-27 PROCEDURE — 99233 SBSQ HOSP IP/OBS HIGH 50: CPT | Mod: ,,, | Performed by: STUDENT IN AN ORGANIZED HEALTH CARE EDUCATION/TRAINING PROGRAM

## 2024-09-27 PROCEDURE — 80048 BASIC METABOLIC PNL TOTAL CA: CPT | Performed by: PHYSICIAN ASSISTANT

## 2024-09-27 PROCEDURE — 84100 ASSAY OF PHOSPHORUS: CPT | Performed by: PHYSICIAN ASSISTANT

## 2024-09-27 RX ORDER — INSULIN GLARGINE 100 [IU]/ML
18 INJECTION, SOLUTION SUBCUTANEOUS 2 TIMES DAILY
Status: DISCONTINUED | OUTPATIENT
Start: 2024-09-27 | End: 2024-09-29 | Stop reason: HOSPADM

## 2024-09-27 RX ADMIN — FUROSEMIDE 80 MG: 10 INJECTION, SOLUTION INTRAVENOUS at 02:09

## 2024-09-27 RX ADMIN — INSULIN ASPART 10 UNITS: 100 INJECTION, SOLUTION INTRAVENOUS; SUBCUTANEOUS at 09:09

## 2024-09-27 RX ADMIN — GABAPENTIN 100 MG: 100 CAPSULE ORAL at 09:09

## 2024-09-27 RX ADMIN — FUROSEMIDE 80 MG: 10 INJECTION, SOLUTION INTRAVENOUS at 09:09

## 2024-09-27 RX ADMIN — CEFAZOLIN 6 G: 10 INJECTION, POWDER, FOR SOLUTION INTRAVENOUS at 10:09

## 2024-09-27 RX ADMIN — INSULIN ASPART 8 UNITS: 100 INJECTION, SOLUTION INTRAVENOUS; SUBCUTANEOUS at 11:09

## 2024-09-27 RX ADMIN — EPLERENONE 25 MG: 25 TABLET, FILM COATED ORAL at 09:09

## 2024-09-27 RX ADMIN — HYDROCORTISONE ACETATE 25 MG: 25 SUPPOSITORY RECTAL at 09:09

## 2024-09-27 RX ADMIN — ATORVASTATIN CALCIUM 40 MG: 20 TABLET, FILM COATED ORAL at 09:09

## 2024-09-27 RX ADMIN — WARFARIN SODIUM 1.5 MG: 1 TABLET ORAL at 04:09

## 2024-09-27 RX ADMIN — INSULIN GLARGINE 18 UNITS: 100 INJECTION, SOLUTION SUBCUTANEOUS at 09:09

## 2024-09-27 RX ADMIN — FLUCONAZOLE 400 MG: 100 TABLET ORAL at 09:09

## 2024-09-27 RX ADMIN — LEVETIRACETAM 1500 MG: 500 TABLET, FILM COATED ORAL at 09:09

## 2024-09-27 RX ADMIN — DULOXETINE HYDROCHLORIDE 30 MG: 30 CAPSULE, DELAYED RELEASE ORAL at 09:09

## 2024-09-27 RX ADMIN — GABAPENTIN 400 MG: 400 CAPSULE ORAL at 09:09

## 2024-09-27 RX ADMIN — POTASSIUM CHLORIDE 30 MEQ: 750 CAPSULE, EXTENDED RELEASE ORAL at 09:09

## 2024-09-27 RX ADMIN — INSULIN ASPART 10 UNITS: 100 INJECTION, SOLUTION INTRAVENOUS; SUBCUTANEOUS at 11:09

## 2024-09-27 RX ADMIN — PANTOPRAZOLE SODIUM 40 MG: 40 TABLET, DELAYED RELEASE ORAL at 09:09

## 2024-09-27 RX ADMIN — INSULIN ASPART 3 UNITS: 100 INJECTION, SOLUTION INTRAVENOUS; SUBCUTANEOUS at 09:09

## 2024-09-27 RX ADMIN — AMIODARONE HYDROCHLORIDE 200 MG: 200 TABLET ORAL at 09:09

## 2024-09-27 RX ADMIN — AMLODIPINE BESYLATE 5 MG: 5 TABLET ORAL at 09:09

## 2024-09-27 RX ADMIN — Medication 400 MG: at 09:09

## 2024-09-27 NOTE — PLAN OF CARE
Problem: Fall Injury Risk  Goal: Absence of Fall and Fall-Related Injury  Outcome: Progressing  Intervention: Identify and Manage Contributors  Flowsheets (Taken 9/27/2024 0040)  Self-Care Promotion:   independence encouraged   BADL personal objects within reach  Medication Review/Management: medications reviewed  Intervention: Promote Injury-Free Environment  Flowsheets (Taken 9/27/2024 0040)  Safety Promotion/Fall Prevention:   assistive device/personal item within reach   commode/urinal/bedpan at bedside   side rails raised x 2   nonskid shoes/socks when out of bed   medications reviewed   lighting adjusted   instructed to call staff for mobility

## 2024-09-27 NOTE — ASSESSMENT & PLAN NOTE
BG goal: 140-180  T2DM.  History of LVAD.      - Increase Lantus to 18 units BID (20% dose increase)   -Continue Novolog 10 units TID with meals   - Novolog /25  - POCT Glucose before meals and at bedtime  - Hypoglycemia protocol in place      ** Please notify Endocrine for any change and/or advance in diet**  ** Please call Endocrine for any BG related issues **     Discharge Planning:   TBD. Please notify endocrinology prior to discharge.

## 2024-09-27 NOTE — PROGRESS NOTES
"[facility]  Endocrinology  Progress Note    Admit Date: 2024     Reason for Consult: Management of T2DM, Hyperglycemia      Surgical Procedure and Date: LVAD 2022      Diabetes diagnosis year:      Home Diabetes Medications:  Levemir 18 units twice daily and Novolog 10 units SSI (150/25) per patient  Last recs from recent admit,.   Lantus 12 units twice daily  - Novolog 8 units TID with meals  Add correction scale if needed.  Blood sugar 150 to 200 add 2 units  Blood sugar 201 to 250 add 4 units  Blood sugar 251 to 300 add 6 units  Blood sugar 301 to 350 add 8 units  Blood sugar greater than 350 add 10 units     How often checking glucose at home?  1-3x day  BG readings on regimen: 100s  Hypoglycemia on the regimen?  Not recently  Missed doses on regimen?  Yes     Diabetes Complications include:   Hyperglycemia     Complicating diabetes co morbidities:   History of MI, CHF, and CKD        HPI:   Patient is a 39 y.o. male with stage D CHF due to NICM, polysubstance abuse, DM, underwent DT-HM3 implantation 2022 with early RV failure requiring RVAD with ProTek Duo after admitted with ADHF/cardiogenic shock on home milrinone requiring IABP. He underwent RVAD removal and chest closure 2022.  Presented to the ED with c/o rectal bleeding.  Endocrine consulted for BG management              Interval HPI:   Overnight events: Remains in CSU. BG variable on current SQ insulin regimen (). Novolog held with dinner yesterday likely cause of BG excursions overnight. Diet Cardiac Cardiac (Low Na/Chol); Fluid - 2000mL    Eatin%  Nausea: No  Hypoglycemia and intervention: Yes  Fever: No  TPN and/or TF: No  If yes, type of TF/TPN and rate: n/a    BP (!) 78/0 (BP Location: Left arm, Patient Position: Lying)   Pulse 66   Temp 98.1 °F (36.7 °C) (Axillary)   Resp 18   Ht 6' 3" (1.905 m)   Wt 74.5 kg (164 lb 3.9 oz)   SpO2 100%   BMI 20.53 kg/m²     Labs Reviewed and Include " "   Recent Labs   Lab 09/27/24  0423   *   CALCIUM 9.2   ALBUMIN 2.5*   PROT 8.3   *   K 3.7   CO2 29   CL 97   BUN 15   CREATININE 1.1   ALKPHOS 269*   ALT 5*   AST 35   BILITOT 1.3*     Lab Results   Component Value Date    WBC 8.05 09/27/2024    HGB 7.5 (L) 09/27/2024    HCT 26.2 (L) 09/27/2024    MCV 74 (L) 09/27/2024     09/27/2024     No results for input(s): "TSH", "FREET4" in the last 168 hours.  Lab Results   Component Value Date    HGBA1C 10.3 (H) 08/25/2024       Nutritional status:   Body mass index is 20.53 kg/m².  Lab Results   Component Value Date    ALBUMIN 2.5 (L) 09/27/2024    ALBUMIN 2.3 (L) 09/25/2024    ALBUMIN 2.5 (L) 09/23/2024     Lab Results   Component Value Date    PREALBUMIN 14 (L) 09/27/2024    PREALBUMIN 12 (L) 09/25/2024    PREALBUMIN 11 (L) 09/09/2024       Estimated Creatinine Clearance: 95 mL/min (based on SCr of 1.1 mg/dL).    Accu-Checks  Recent Labs     09/25/24  0728 09/25/24  1049 09/25/24  1550 09/25/24  2107 09/26/24  0610 09/26/24  1047 09/26/24  1548 09/26/24  1717 09/26/24  1931 09/27/24  0559   POCTGLUCOSE 112* 94 85 212* 288* 278* 69* 146* 298* 346*       Current Medications and/or Treatments Impacting Glycemic Control  Immunotherapy:    Immunosuppressants       None          Steroids:   Hormones (From admission, onward)      None          Pressors:    Autonomic Drugs (From admission, onward)      None          Hyperglycemia/Diabetes Medications:   Antihyperglycemics (From admission, onward)      Start     Stop Route Frequency Ordered    09/27/24 0900  insulin glargine U-100 (Lantus) pen 18 Units         -- SubQ 2 times daily 09/27/24 0842    09/26/24 1645  insulin aspart U-100 pen 10 Units         -- SubQ 3 times daily with meals 09/26/24 1343    09/23/24 1409  insulin aspart U-100 pen 0-10 Units         -- SubQ Before meals & nightly PRN 09/23/24 1309            ASSESSMENT and PLAN    Cardiac/Vascular  LVAD (left ventricular assist device) " present  Managed by primary team  Optimize BG control      Endocrine  Type 2 diabetes mellitus with hyperglycemia  BG goal: 140-180  T2DM.  History of LVAD.      - Increase Lantus to 18 units BID (20% dose increase)   -Continue Novolog 10 units TID with meals   - Novolog /25  - POCT Glucose before meals and at bedtime  - Hypoglycemia protocol in place      ** Please notify Endocrine for any change and/or advance in diet**  ** Please call Endocrine for any BG related issues **     Discharge Planning:   TBD. Please notify endocrinology prior to discharge.      GI  * Hematochezia  Managed per primary team  Avoid hypoglycemia        Rectal bleeding    Managed by primary team  Optimize BG control        Jody Starkey, NP  Endocrinology  [facility]

## 2024-09-27 NOTE — PROGRESS NOTES
[facility]  Heart Transplant  Progress Note    Patient Name: Kevan Queen  MRN: [id number]  Admission Date: 9/23/2024  Hospital Length of Stay: 4 days  Attending Physician: Dalia Crum MD  Primary Care Provider: Rosio Armendariz FNP  Principal Problem:Hematochezia    Subjective:   Interval History:  No events overnight or new complaints this AM. Stable from bleeding standpoint w/ stable H/H with INR up to 1.7.     Continuous Infusions:   0.9% NaCl   Intravenous Continuous         Scheduled Meds:   alteplase  2 mg Intra-Catheter Once    amiodarone  200 mg Oral Daily    amLODIPine  5 mg Oral Daily    atorvastatin  40 mg Oral Daily    ceFAZolin (ANCEF) 6 g in D5W 500 mL CONTINUOUS INFUSION  6 g Intravenous Q24H    DULoxetine  30 mg Oral Daily    eplerenone  25 mg Oral Daily    fluconazole  400 mg Oral Daily    furosemide (LASIX) injection  80 mg Intravenous TID    gabapentin  100 mg Oral Daily    And    gabapentin  400 mg Oral QHS    hydrocortisone  25 mg Rectal BID    insulin aspart U-100  9 Units Subcutaneous TIDWM    insulin glargine U-100  15 Units Subcutaneous BID    levETIRAcetam  1,500 mg Oral BID    magnesium oxide  400 mg Oral Daily    pantoprazole  40 mg Oral Daily    potassium chloride  30 mEq Oral Daily    sodium chloride 0.9%  250 mL Intravenous Once     PRN Meds:  Current Facility-Administered Medications:     0.9%  NaCl infusion (for blood administration), , Intravenous, Q24H PRN    0.9%  NaCl infusion (for blood administration), , Intravenous, Q24H PRN    acetaminophen, 650 mg, Oral, Q6H PRN    cyclobenzaprine, 5 mg, Oral, Daily PRN    dextrose 10%, 12.5 g, Intravenous, PRN    dextrose 10%, 12.5 g, Intravenous, PRN    dextrose 10%, 25 g, Intravenous, PRN    dextrose 10%, 25 g, Intravenous, PRN    glucagon (human recombinant), 1 mg, Intramuscular, PRN    glucagon (human recombinant), 1 mg, Intramuscular, PRN    glucose, 16 g, Oral, PRN    glucose, 24 g, Oral, PRN     HYDROmorphone, 0.2 mg, Intravenous, Q5 Min PRN    insulin aspart U-100, 0-10 Units, Subcutaneous, QID (AC + HS) PRN    meperidine, 12.5 mg, Intravenous, Once PRN    ondansetron, 4 mg, Oral, Q8H PRN    ondansetron, 4 mg, Intravenous, Daily PRN    Review of patient's allergies indicates:   Allergen Reactions    Torsemide Hives     Objective:     Vital Signs (Most Recent):  Temp: 98.1 °F (36.7 °C) (09/26/24 1107)  Pulse: 79 (09/26/24 1112)  Resp: 18 (09/26/24 1107)  BP: (!) 80/0 (09/26/24 1254)  SpO2: 100 % (09/26/24 1107) Vital Signs (24h Range):  Temp:  [97.6 °F (36.4 °C)-98.8 °F (37.1 °C)] 98.1 °F (36.7 °C)  Pulse:  [73-91] 79  Resp:  [16-25] 18  SpO2:  [94 %-100 %] 100 %  BP: ()/(0-79) 80/0     Patient Vitals for the past 72 hrs (Last 3 readings):   Weight   09/23/24 1331 74.5 kg (164 lb 3.9 oz)     Body mass index is 20.53 kg/m².      Intake/Output Summary (Last 24 hours) at 9/26/2024 1326  Last data filed at 9/26/2024 1244  Gross per 24 hour   Intake 1190 ml   Output 2860 ml   Net -1670 ml       Hemodynamic Parameters:  CVP:  [15 mmHg] 15 mmHg           Physical Exam  Vitals reviewed.   HENT:      Head: Normocephalic.      Nose: Nose normal.   Eyes:      Conjunctiva/sclera: Conjunctivae normal.   Cardiovascular:      Rate and Rhythm: Normal rate and regular rhythm.      Comments: Smooth VAD hum   Pulmonary:      Effort: Pulmonary effort is normal.   Abdominal:      General: Abdomen is flat.      Tenderness: There is no abdominal tenderness.   Musculoskeletal:         General: Normal range of motion.      Cervical back: Normal range of motion.   Skin:     General: Skin is warm.      Capillary Refill: Capillary refill takes less than 2 seconds.   Neurological:      General: No focal deficit present.      Mental Status: He is alert.   Psychiatric:         Mood and Affect: Mood normal.         Behavior: Behavior normal.         Thought Content: Thought content normal.         Judgment: Judgment normal.           Significant Labs:  CBC:  Recent Labs   Lab 09/25/24  0630 09/25/24  1819 09/26/24  0409   WBC 11.45 14.96* 7.81   RBC 3.35* 3.79* 3.51*   HGB 6.9* 8.1* 7.6*   HCT 23.1* 27.3* 25.6*    227 217   MCV 69* 72* 73*   MCH 20.6* 21.4* 21.7*   MCHC 29.9* 29.7* 29.7*     BNP:  Recent Labs   Lab 09/25/24  0359   *     CMP:  Recent Labs   Lab 09/23/24  0205 09/24/24  0312 09/25/24  0359 09/26/24  0409   GLU  --  256* 139* 166*   CALCIUM 8.5 8.6* 8.7 9.1   ALBUMIN 2.5*  --  2.3*  --    PROT  --   --  7.7  --    * 132* 136 136   K 3.3* 3.1* 3.3* 3.5   CO2 28 30* 31* 29   CL 93* 95 95 96   BUN 13.0 13 10 13   CREATININE 1.33* 0.9 0.9 1.0   ALKPHOS 264*  --  230*  --    ALT 12  --  6*  --    AST 40*  --  28  --    BILITOT 1.3  --  2.0*  --       Coagulation:   Recent Labs   Lab 09/24/24 0313 09/25/24  0359 09/26/24  0409   INR 2.0* 1.9* 1.9*   APTT 37.0* 35.0* 34.7*     LDH:  Recent Labs   Lab 09/24/24 0313 09/25/24  0359 09/26/24  0409   * 255 264*     Microbiology:  Microbiology Results (last 7 days)       ** No results found for the last 168 hours. **            I have reviewed all pertinent labs within the past 24 hours.    Estimated Creatinine Clearance: 104.5 mL/min (based on SCr of 1 mg/dL).    Diagnostic Results:  I have reviewed all pertinent imaging results/findings within the past 24 hours.  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  He was weaned off  but he had to restarted due to RVF and transitioned to milrinone (secondary to  shortage). Most recently admitted on 9/10-9/12 for pyelonephritis with Bcx positive for yeast. Had many C  discussions but he did not want to be DNR/DNI, and wants to continue aggressive care. He was weaned off milrinone but was discharged with PICC for IV antibiotics. Transferred after presenting to the ED last night after one episode of bright red blood per rectum yesterday evening. Pt states he has had rectal pain for a few days. His mother found an external hemorrhoid and was able to reduce with manual pressure in the rectum. H/H stable. INR 2.1.     Rectal bleeding  -Rectal bleeding x 1 PTA.  External hemorrhoid noted by mother that she then reduced   -Multiple bloody BM during admission   -H/H trending down , given 1 unit PRBC 9/24, will give additional unit this morning   -GI consulted, s/p Colonoscopy 9/27 w/ clipping of bleeding rectal ulcer  - H/H since stablized and no evidence of bleeding    LVAD (left ventricular assist device) present  -HeartMate 3 Implanted 6/23/2022 with early RV failure requiring RVAD with ProTek Duo   -Hold Coumadin,  Goal INR 2.0-3.0 . Subtherapeutic today, but actively bleeding   -Antiplatelets ASA 81 mg, on hold with GIB  -LDH is stable overall today. Will continue to monitor daily.  -Speed set at 5100, LSL 4700 rpm  -Interrogation notable for no events  -Not listed for OHTx          Procedure: Device Interrogation Including analysis of device parameters  Current Settings: Ventricular Assist Device  Review of device function is stable/unstable stable        9/27/2024     9:31 AM 9/27/2024     3:44 AM 9/26/2024    11:06 PM 9/26/2024     7:50 PM 9/26/2024     5:09 PM 9/26/2024    12:46 PM 9/26/2024     8:14 AM   TXP LVAD INTERROGATIONS   Type HeartMate3 HeartMate3 HeartMate3 HeartMate3 HeartMate3 HeartMate3 HeartMate3   Flow 4.1 4.2 4.3 4.2 3.8 4.2 4.4   Speed 5100 5100 5100 5100 5100 5100 5100   PI 5.4 4.8 5.3 4.3 7 4.9 4.7   Power (Serna) 3.6 3.6 3.5 3.5 3.6 3.5 3.5   LSL 4700 4700 4700 4700 4700 4700 4700   Pulsatility Intermittent pulse Intermittent pulse Intermittent pulse  Intermittent pulse            Type 2 diabetes mellitus with hyperglycemia  -IDDM  -Endocrinology consulted         Jeff Spencer PA-C  Heart Transplant  [facility]

## 2024-09-27 NOTE — ASSESSMENT & PLAN NOTE
-Rectal bleeding x 1 PTA.  External hemorrhoid noted by mother that she then reduced   -Multiple bloody BM during admission   -H/H trending down , given 1 unit PRBC 9/24, will give additional unit this morning   -GI consulted, s/p Colonoscopy 9/27 w/ clipping of bleeding rectal ulcer  - H/H since stablized and no evidence of bleeding

## 2024-09-27 NOTE — PROGRESS NOTES
09/27/2024  Enrique NANDO Awan Jr    Current provider:  Dalia Crum MD    Device interrogation:      9/27/2024     3:44 AM 9/26/2024    11:06 PM 9/26/2024     7:50 PM 9/26/2024     5:09 PM 9/26/2024    12:46 PM 9/26/2024     8:14 AM 9/26/2024     4:41 AM   TXP LVAD INTERROGATIONS   Type HeartMate3 HeartMate3 HeartMate3 HeartMate3 HeartMate3 HeartMate3 HeartMate3   Flow 4.2 4.3 4.2 3.8 4.2 4.4 4.1   Speed 5100 5100 5100 5100 5100 5100 5100   PI 4.8 5.3 4.3 7 4.9 4.7 4.8   Power (Serna) 3.6 3.5 3.5 3.6 3.5 3.5 3.6   LSL 4700 4700 4700 4700 4700 4700    Pulsatility Intermittent pulse Intermittent pulse Intermittent pulse    Intermittent pulse          Rounded on Kevan Queen to ensure all mechanical assist device settings (IABP or VAD) were appropriate and all parameters were within limits.  I was able to ensure all back up equipment was present, the staff had no issues, and the Perfusion Department daily rounding was complete.      For implantable VADs: Interrogation of Ventricular assist device was performed with analysis of device parameters and review of device function. I have personally reviewed the interrogation findings and agree with findings as stated.     In emergency, the nursing units have been notified to contact the perfusion department either by:  Calling [id number] from 630am to 4pm Mon thru Fri, utilizing the On-Call Finder functionality of Epic and searching for Perfusion, or by contacting the hospital  from 4pm to 630am and on weekends and asking to speak with the perfusionist on call.    7:19 AM

## 2024-09-27 NOTE — PROGRESS NOTES
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 S/p EGD/Cscope, noted to have rectal ulcer s/p clip. No leukocytosis noted on lab work today. H/H stable.     Recommendations:  -continue cefazolin 6g continuous infusion iv daily, maria isabel 10/9 for 6w course with transition back to suppressive cefadroxil after completion of iv abx  -continue fluconazole 400 mg daily, maria isabel 10/16 for 6w course from cleared blood culture   -monitor QTC and DDI  -please see prior opat note on 9/7              Thank you for your consult. I will follow-up with patient. Please contact us if you have any additional questions. Above d/w primary team.       Laura Baldwin MD  Infectious Disease  [facility] - [facility] Step[facility]    Subjective:     Principal Problem:Hematochezia    HPI: 38 yo male with HM3 as DT c/b recurrent DLESI with recent admission for MSSA DLESI/bacteremia and Cparapsilosis fungemia (maintained on prolong course of cefazolin/fluc) admitted for BRBPR. Pt reported having constipation prior to admission with associated straining with subsequent BRPBR. He denied fevers, chills, issues with PICC or IV abx. Recently restarted taking fluconazole after prescription was re-sent. Pt is currently on cefazolin and fluconazole. Plan for cscope tomorrow.    Interval History: No fevers documented  overnight.   H/H stable.     Review of Systems   Constitutional:  Negative for chills and fever.   Gastrointestinal:  Negative for blood in stool.   All other systems reviewed and are negative.    Objective:     Vital Signs (Most Recent):  Temp: 98.4 °F (36.9 °C) (09/27/24 1111)  Pulse: 88 (09/27/24 1111)  Resp: 18 (09/27/24 1111)  BP: (!) 80/0 (09/27/24 0830)  SpO2: 100 % (09/27/24 1111) Vital Signs (24h Range):  Temp:  [96.8 °F (36 °C)-98.8 °F (37.1 °C)] 98.4 °F (36.9 °C)  Pulse:  [] 88  Resp:  [18-20] 18  SpO2:  [97 %-100 %] 100 %  BP: ()/(0-76) 80/0     Weight: 67.6 kg (149 lb 0.5 oz)  Body mass index is 18.63 kg/m².    Estimated Creatinine Clearance: 86.2 mL/min (based on SCr of 1.1 mg/dL).     Physical Exam  Constitutional:       General: He is not in acute distress.     Appearance: He is not ill-appearing or toxic-appearing.   Eyes:      General:         Right eye: No discharge.         Left eye: No discharge.   Pulmonary:      Effort: Pulmonary effort is normal. No respiratory distress.   Abdominal:      Comments: LVAD dressed   Musculoskeletal:      Right lower leg: No edema.      Left lower leg: No edema.   Skin:     General: Skin is warm and dry.      Comments: R scl tunneled picc   Neurological:      Mental Status: He is alert and oriented to person, place, and time.          Significant Labs:   Microbiology Results (last 7 days)       ** No results found for the last 168 hours. **            Significant Imaging: I have reviewed all pertinent imaging results/findings within the past 24 hours.

## 2024-09-27 NOTE — PT/OT/SLP PROGRESS
"Occupational Therapy   Co-Treatment    Name: Kevan Queen  MRN: [id number]  Admitting Diagnosis:  Hematochezia  2 Days Post-Op    Recommendations:     Discharge Recommendations: Low Intensity Therapy  Discharge Equipment Recommendations:  walker, rolling, grab bar  Barriers to discharge:  None    Assessment:     Kevan Queen is a 39 y.o. male with a medical diagnosis of Hematochezia.  He presents with progress towards goals as evidenced by increased tolerance for standing ADL, and increased distance mobilized. Performance deficits affecting function are weakness, impaired functional mobility, gait instability, impaired endurance, impaired balance, impaired self care skills, impaired cardiopulmonary response to activity, pain, decreased coordination. Pt benefits from co-treatment with PT to accommodate pt's activity tolerance and progression with therapy.      Rehab Prognosis:  Good; patient would benefit from acute skilled OT services to address these deficits and reach maximum level of function.       Plan:     Patient to be seen 4 x/week to address the above listed problems via self-care/home management, therapeutic activities, therapeutic exercises  Plan of Care Expires:    Plan of Care Reviewed with: patient    Subjective     Chief Complaint: "I'm tired."  Patient/Family Comments/goals: to get better and go home  Pain/Comfort:  Pain Rating 1: 10/10  Location - Side 1: Bilateral  Location - Orientation 1: generalized  Location 1: back  Pain Addressed 1: Reposition, Distraction  Pain Rating Post-Intervention 1: 10/10    Objective:     Communicated with: RN prior to session.  Patient found supine with telemetry, LVAD, central line upon OT entry to room.    General Precautions: Standard, fall, LVAD    Orthopedic Precautions:   Braces:    Respiratory Status: Room air     Occupational Performance:     Bed Mobility:    Patient completed Scooting/Bridging with modified independence  Patient completed " Supine to Sit with modified independence     Functional Mobility/Transfers:  Patient completed Sit <> Stand Transfer with minimum assistance  with  rolling walker with increased time  Functional Mobility: CGA using RW to/from bathroom and in hallway to simulate HH distances; pt required cueing for proper use of AD    Activities of Daily Living:  Feeding:  independence    Grooming: independence for taks with SBA provided for static standing at sink  Upper Body Dressing: stand by assistance for set-up  Lower Body Dressing: minimum assistance for donning socks (OT assisted with initial threading over toes)  Toileting: contact guard assistance simulated      AMPAC 6 Click ADL: 20    Treatment & Education:  Pt ed on OT POC  Pt able to complete VAD management with set-up  Pt ed on ROM ex's daily for increased overall strength and endurance to reduce risk of hospital acquired weakness  Pt ed on safety with ADL and fall risk  Reinforced use of call button to contact nursing staff for assistance with mobility    Patient left up in chair with all lines intact, call button in reach, RN notified, and MD present    GOALS:   Multidisciplinary Problems       Occupational Therapy Goals          Problem: Occupational Therapy    Goal Priority Disciplines Outcome Interventions   Occupational Therapy Goal     OT, PT/OT Progressing    Description: Goals to be met by: 7 days 10/3/24     Patient will increase functional independence with ADLs by performing:    Pt to complete UE dressing with set-0up  Pt to complete LE dressing with SBA  Pt to complete standing g/h skills with SBA  Pt to complete toileting with SBA  PT to complete t/f bed, chair and commode with SBA                       Time Tracking:     OT Date of Treatment: 09/27/24  OT Start Time: 0829  OT Stop Time: 0854  OT Total Time (min): 25 min    Billable Minutes:Self Care/Home Management 15  Therapeutic Activity 8               9/27/2024

## 2024-09-27 NOTE — ASSESSMENT & PLAN NOTE
-HeartMate 3 Implanted 6/23/2022 with early RV failure requiring RVAD with ProTek Duo   -Hold Coumadin,  Goal INR 2.0-3.0 . Subtherapeutic today, but actively bleeding   -Antiplatelets ASA 81 mg, on hold with GIB  -LDH is stable overall today. Will continue to monitor daily.  -Speed set at 5100, LSL 4700 rpm  -Interrogation notable for no events  -Not listed for OHTx          Procedure: Device Interrogation Including analysis of device parameters  Current Settings: Ventricular Assist Device  Review of device function is stable/unstable stable        9/27/2024     9:31 AM 9/27/2024     3:44 AM 9/26/2024    11:06 PM 9/26/2024     7:50 PM 9/26/2024     5:09 PM 9/26/2024    12:46 PM 9/26/2024     8:14 AM   TXP LVAD INTERROGATIONS   Type HeartMate3 HeartMate3 HeartMate3 HeartMate3 HeartMate3 HeartMate3 HeartMate3   Flow 4.1 4.2 4.3 4.2 3.8 4.2 4.4   Speed 5100 5100 5100 5100 5100 5100 5100   PI 5.4 4.8 5.3 4.3 7 4.9 4.7   Power (Serna) 3.6 3.6 3.5 3.5 3.6 3.5 3.5   LSL 4700 4700 4700 4700 4700 4700 4700   Pulsatility Intermittent pulse Intermittent pulse Intermittent pulse Intermittent pulse

## 2024-09-27 NOTE — PROGRESS NOTES
Update:  TREY notified of pt's need for IV abx at [place].  TREY met with pt to discuss dc planning.  Pt was alert, oriented, calm and cooperative.  Pt stated that he will return to his mom's home at [place].  [facility] per pt request.  Pt agreeable to use prior infusion agency again ([facility]).  Pt reported he will have a ride home at [place].    TREY contacted Zulma with [facility] to notify of referral.  Zulma to begin working on case.  Orders are pending at this time.    [facility] [phone]    TREY notified [facility] of pt's pending dc with IV abx.  Faxed updated records.   [phone]  Fax: [phone]    Home Health will need to be called at [place].     TREY will follow.    Addendum 2:25p  TREY faxed orders to [facility] and notified Zulma with [facility] that orders are in EPIC.

## 2024-09-27 NOTE — SUBJECTIVE & OBJECTIVE
"Interval HPI:   Overnight events: Remains in CSU. BG variable on current SQ insulin regimen (). Novolog held with dinner yesterday likely cause of BG excursions overnight. Diet Cardiac Cardiac (Low Na/Chol); Fluid - 2000mL    Eatin%  Nausea: No  Hypoglycemia and intervention: Yes  Fever: No  TPN and/or TF: No  If yes, type of TF/TPN and rate: n/a    BP (!) 78/0 (BP Location: Left arm, Patient Position: Lying)   Pulse 66   Temp 98.1 °F (36.7 °C) (Axillary)   Resp 18   Ht 6' 3" (1.905 m)   Wt 74.5 kg (164 lb 3.9 oz)   SpO2 100%   BMI 20.53 kg/m²     Labs Reviewed and Include    Recent Labs   Lab 24  0423   *   CALCIUM 9.2   ALBUMIN 2.5*   PROT 8.3   *   K 3.7   CO2 29   CL 97   BUN 15   CREATININE 1.1   ALKPHOS 269*   ALT 5*   AST 35   BILITOT 1.3*     Lab Results   Component Value Date    WBC 8.05 2024    HGB 7.5 (L) 2024    HCT 26.2 (L) 2024    MCV 74 (L) 2024     2024     No results for input(s): "TSH", "FREET4" in the last 168 hours.  Lab Results   Component Value Date    HGBA1C 10.3 (H) 2024       Nutritional status:   Body mass index is 20.53 kg/m².  Lab Results   Component Value Date    ALBUMIN 2.5 (L) 2024    ALBUMIN 2.3 (L) 2024    ALBUMIN 2.5 (L) 2024     Lab Results   Component Value Date    PREALBUMIN 14 (L) 2024    PREALBUMIN 12 (L) 2024    PREALBUMIN 11 (L) 2024       Estimated Creatinine Clearance: 95 mL/min (based on SCr of 1.1 mg/dL).    Accu-Checks  Recent Labs     24  0728 24  1049 24  1550 24  2107 24  0610 24  1047 24  1548 24  1717 24  1931 24  0559   POCTGLUCOSE 112* 94 85 212* 288* 278* 69* 146* 298* 346*       Current Medications and/or Treatments Impacting Glycemic Control  Immunotherapy:    Immunosuppressants       None          Steroids:   Hormones (From admission, onward)      None          Pressors:    Autonomic " Drugs (From admission, onward)      None          Hyperglycemia/Diabetes Medications:   Antihyperglycemics (From admission, onward)      Start     Stop Route Frequency Ordered    09/27/24 0900  insulin glargine U-100 (Lantus) pen 18 Units         -- SubQ 2 times daily 09/27/24 0842    09/26/24 1645  insulin aspart U-100 pen 10 Units         -- SubQ 3 times daily with meals 09/26/24 1343    09/23/24 1409  insulin aspart U-100 pen 0-10 Units         -- SubQ Before meals & nightly PRN 09/23/24 1309

## 2024-09-27 NOTE — SUBJECTIVE & OBJECTIVE
Interval History: No fevers documented overnight.   H/H stable.     Review of Systems   Constitutional:  Negative for chills and fever.   Gastrointestinal:  Negative for blood in stool.   All other systems reviewed and are negative.    Objective:     Vital Signs (Most Recent):  Temp: 98.4 °F (36.9 °C) (09/27/24 1111)  Pulse: 88 (09/27/24 1111)  Resp: 18 (09/27/24 1111)  BP: (!) 80/0 (09/27/24 0830)  SpO2: 100 % (09/27/24 1111) Vital Signs (24h Range):  Temp:  [96.8 °F (36 °C)-98.8 °F (37.1 °C)] 98.4 °F (36.9 °C)  Pulse:  [] 88  Resp:  [18-20] 18  SpO2:  [97 %-100 %] 100 %  BP: ()/(0-76) 80/0     Weight: 67.6 kg (149 lb 0.5 oz)  Body mass index is 18.63 kg/m².    Estimated Creatinine Clearance: 86.2 mL/min (based on SCr of 1.1 mg/dL).     Physical Exam  Constitutional:       General: He is not in acute distress.     Appearance: He is not ill-appearing or toxic-appearing.   Eyes:      General:         Right eye: No discharge.         Left eye: No discharge.   Pulmonary:      Effort: Pulmonary effort is normal. No respiratory distress.   Abdominal:      Comments: LVAD dressed   Musculoskeletal:      Right lower leg: No edema.      Left lower leg: No edema.   Skin:     General: Skin is warm and dry.      Comments: R scl tunneled picc   Neurological:      Mental Status: He is alert and oriented to person, place, and time.          Significant Labs:   Microbiology Results (last 7 days)       ** No results found for the last 168 hours. **            Significant Imaging: I have reviewed all pertinent imaging results/findings within the past 24 hours.

## 2024-09-27 NOTE — PLAN OF CARE
[facility]   Heart Transplant/VAD Clinic   [address] [place] [address]   [phone] after hours          [phone] fax     VAD HOME  HEALTH ORDERS      Admit to Home Health    Diagnosis:   Patient Active Problem List   Diagnosis    Acute on chronic systolic CHF (congestive heart failure)    Anxiety disorder, unspecified    Anemia of chronic disease    Ecstasy abuse    Cannabis use disorder, severe, dependence    CHF (NYHA class IV, ACC/AHA stage D)    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    Chronic combined systolic and diastolic heart failure    Type 2 diabetes mellitus with hyperglycemia    Insomnia    Tachycardia    Palliative care encounter    Familial dilated cardiomyopathy    LVAD (left ventricular assist device) present    On mechanically assisted ventilation    Hyponatremia    Seizure-like activity    Temporal lobe seizure    Right heart failure secondary to left heart failure-post LVAD surgery    History of substance abuse    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)    Pleural effusion    Noncompliance with medication regimen    Moderate malnutrition    Receiving inotropic medication    Chronic anticoagulation    Type 2 diabetes mellitus with hyperosmolar hyperglycemic state (HHS)    HTN (hypertension)    Acute decompensated heart failure    Type 2 diabetes mellitus with hyperglycemia, with long-term current use of insulin    Muscle cramping    Goals of care, counseling/discussion    Advance care planning    Hypokalemia    Bilateral back pain    Supratherapeutic INR    Subcutaneous nodule of breast    Polysubstance abuse    Atrial flutter    Severe sepsis    Pain    Dyspnea    Advanced care planning/counseling discussion    Pyelonephritis    Pulmonary nodules    Fungemia    Rectal bleeding    Hematochezia    Anticoagulated       Patient is homebound due to:  NYHA Class IV HF. S/P LVAD placement.     Diet: Low  Fat, Low cholesterol, 2Gm Na, Coumadin restrictions.    Acitivities: No Swimming, bathing, vacuuming, contact sports.    Fresh implants= Sternal Precautions    Nursing:   SN to complete comprehensive assessment including routine vital signs. Instruct on disease process and s/s of complications to report to MD. Review/verify medication list sent home with the patient at time of discharge  and instruct patient/caregiver as needed. Frequency may be adjusted depending on start of care date.    **LVAD driveline exit site dressing change is to be completed per LVAD patient/caregiver only**.    Notify MD if:  SBP > 120 or < 80;   MAP > 80 or < 65;   HR > 120 or < 60;   Temp > 101;   Weight gain >3lbs in 1 day or 5lbs in 1 week.    LABS: Weekly CBC, CMP, CRP, INR. SN to perform labs: PT/INR per Coumadin clinic [phone].   Follow up INR date: 9/30/24  No Finger Sticks     Fax Lab Results to Infectious Diseases Provider: Kavya Carrion      [facility] Fax Number: [phone]    HOME INFUSION THERAPY:    SN to perform Infusion Therapy/Central Line Care.  Review Central Line Care & Central Line Flush with patient.    Administer (drug and dose): Cefazolin 6 g continuous infusion, daily, End of treatment 10/9/24    PICC/Central line care:  Scrub the Hub: Prior to accessing the line, always perform a 30 second alcohol scrub  Each lumen of the central line is to be flushed at least daily with 10 mL Normal Saline and 3 mL Heparin flush (100 units/mL)  Skilled Nurse (SN) may draw blood from IV access  Blood Draw Procedure:   - Aspirate at least 5 mL of blood   - Discard   - Obtain specimen   - Change posiflow cap   - Flush with 20 mL Normal Saline followed by a                 3-5 mL Heparin flush (100 units/mL)  :   - Sterile dressing changes are done weekly and as needed.   - Use chlor-hexadine scrub to cleanse site, apply Biopatch to insertion site,       apply securement device dressing   -  Posi-flow caps are changed weekly and after EVERY lab draw.   - If sterile gauze is under dressing to control oozing,                 dressing change must be performed every 24 hours until gauze is not needed.    CONSULTS:      Physical Therapy to evaluate and treat. Evaluate for home safety and equipment needs; Establish/upgrade home exercise program. Perform / instruct on therapeutic exercises, gait training, transfer training, and Range of Motion.    Occupational Therapy to evaluate and treat. Evaluate home environment for safety and equipment needs. Perform/Instruct on transfers, ADL training, ROM, and therapeutic exercises.        Send initial Home Health orders to [facility] attending physician on call.  Send follow up questions to VAD clinic MD [phone] or [phone].

## 2024-09-27 NOTE — ASSESSMENT & PLAN NOTE
Cparapsilosis fungemia    I independently reviewed patient's lab work and images as documented. 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 S/p EGD/Cscope, noted to have rectal ulcer s/p clip. No leukocytosis noted on lab work today. H/H stable.     Recommendations:  -continue cefazolin 6g continuous infusion iv daily, maria isabel 10/9 for 6w course with transition back to suppressive cefadroxil after completion of iv abx  -continue fluconazole 400 mg daily, maria isabel 10/16 for 6w course from cleared blood culture   -monitor QTC and DDI  -please see prior opat note on 9/7

## 2024-09-27 NOTE — PT/OT/SLP PROGRESS
"Physical Therapy   Co-Treatment    Patient Name:  Kevan Queen   MRN:  [id number]    Recommendations:     Discharge Recommendations: Low Intensity Therapy  Discharge Equipment Recommendations: walker, rolling  Barriers to discharge: None    Assessment:     Kevan Queen is a 39 y.o. male admitted with a medical diagnosis of Hematochezia.  He presents with the following impairments/functional limitations: weakness, impaired endurance, impaired self care skills, impaired functional mobility, gait instability, decreased lower extremity function, pain, impaired cardiopulmonary response to activity Patient with good participation this date, increased ability to perform mobility with less assist required. Ambulation in hallway and standing tolerance for ADLs at sink this date. Patient will continue to benefit from skilled PT during this admit to address BLE strength and endurance deficits, and maximize independence with functional mobility. Co-treatment with OT due to patient's poor activity tolerance and medical complexity requiring skilled assistance from 2 therapists.    Rehab Prognosis: Good; patient would benefit from acute skilled PT services to address these deficits and reach maximum level of function.    Recent Surgery: Procedure(s) (LRB):  COLONOSCOPY (N/A)  EGD (ESOPHAGOGASTRODUODENOSCOPY) (N/A) 2 Days Post-Op    Plan:     During this hospitalization, patient to be seen 4 x/week to address the identified rehab impairments via gait training, therapeutic activities, therapeutic exercises, neuromuscular re-education and progress toward the following goals:    Plan of Care Expires:  10/24/24    Subjective     Chief Complaint: "I could do more if it weren't for this back pain"  Patient/Family Comments/goals: improve endurance  Pain/Comfort:  Pain Rating 1: 10/10  Location - Orientation 1: generalized  Location 1: back  Pain Addressed 1: Reposition, Distraction  Pain Rating Post-Intervention 1: " 10/10      Objective:     Communicated with RN prior to session.  Patient found HOB elevated with telemetry, LVAD, central line upon PT entry to room.     General Precautions: Standard, fall, LVAD  Orthopedic Precautions: N/A  Braces: N/A  Respiratory Status: Room air     Functional Mobility:  Bed Mobility:     Scooting: modified independence  Supine to Sit: modified independence  Transfers:     Sit to Stand:  minimum assistance with rolling walker - from elevated EOB height  Gait: >250 ft with RW + CGA, OT managing IV pole  Mildly unsteady with multidirectional lean evident, inconsistent foot placement, with cueing for appropriate proximity to AD  Balance:   Sitting: good at EOB  Standing      AM-PAC 6 CLICK MOBILITY  Turning over in bed (including adjusting bedclothes, sheets and blankets)?: 4  Sitting down on and standing up from a chair with arms (e.g., wheelchair, bedside commode, etc.): 3  Moving from lying on back to sitting on the side of the bed?: 4  Moving to and from a bed to a chair (including a wheelchair)?: 3  Need to walk in hospital room?: 3  Climbing 3-5 steps with a railing?: 2  Basic Mobility Total Score: 19       Treatment & Education:  Patient with good demonstration of VAD basics  All items placed within reach, and notified on call light usage for assistance with any needs for fall prevention.  Patient educated on importance of OOB activity to promote overall endurance - good adherence noted..  Patient educated on current level of function and progression towards therapeutic goals.    Patient left up in chair with all lines intact, call button in reach, RN notified, and MD present..    GOALS:   Multidisciplinary Problems       Physical Therapy Goals          Problem: Physical Therapy    Goal Priority Disciplines Outcome Goal Variances Interventions   Physical Therapy Goal     PT, PT/OT Progressing     Description: Goals to be met by: 10/24/24     Patient will increase functional independence  with mobility by performin. Supine to sit with [place]  2. Sit to supine with [place]  3. Sit to stand transfer with [place]  4. Bed to chair transfer with [place] using Rolling Walker  5. Gait  x 250 feet with Modified [place] using Rolling Walker.   6. Ascend/descend 12 stair with bilateral Handrails [place] using No Assistive Device.     The mobility limitation cannot be sufficiently resolved by the use of a cane.   Patient's functional mobility deficit can be sufficiently resolved with the use of a Rolling Walker.  Patient's mobility limitation significantly impairs their ability to participate in one of more activities of daily living.  The use of a Rolling Walker will significantly improve the patient's ability to participate in ADLS and the patient will use it on regular basis in the home.                           Time Tracking:     PT Received On: 24  PT Start Time: 829     PT Stop Time: 854  PT Total Time (min): 25 min     Billable Minutes: Gait Training 25       PT/PTA: PT     Number of PTA visits since last PT visit: 0     2024

## 2024-09-28 LAB
ANION GAP SERPL CALC-SCNC: 7 MMOL/L (ref 8–16)
APTT PPP: 34.1 SEC (ref 21–32)
BASOPHILS # BLD AUTO: 0.03 K/UL (ref 0–0.2)
BASOPHILS # BLD AUTO: 0.03 K/UL (ref 0–0.2)
BASOPHILS NFR BLD: 0.3 % (ref 0–1.9)
BASOPHILS NFR BLD: 0.4 % (ref 0–1.9)
BUN SERPL-MCNC: 14 MG/DL (ref 6–20)
CALCIUM SERPL-MCNC: 9 MG/DL (ref 8.7–10.5)
CHLORIDE SERPL-SCNC: 96 MMOL/L (ref 95–110)
CO2 SERPL-SCNC: 30 MMOL/L (ref 23–29)
CREAT SERPL-MCNC: 0.9 MG/DL (ref 0.5–1.4)
DIFFERENTIAL METHOD BLD: ABNORMAL
DIFFERENTIAL METHOD BLD: ABNORMAL
EOSINOPHIL # BLD AUTO: 0.1 K/UL (ref 0–0.5)
EOSINOPHIL # BLD AUTO: 0.1 K/UL (ref 0–0.5)
EOSINOPHIL NFR BLD: 0.5 % (ref 0–8)
EOSINOPHIL NFR BLD: 0.6 % (ref 0–8)
ERYTHROCYTE [DISTWIDTH] IN BLOOD BY AUTOMATED COUNT: 27.9 % (ref 11.5–14.5)
ERYTHROCYTE [DISTWIDTH] IN BLOOD BY AUTOMATED COUNT: 27.9 % (ref 11.5–14.5)
EST. GFR  (NO RACE VARIABLE): >60 ML/MIN/1.73 M^2
GLUCOSE SERPL-MCNC: 162 MG/DL (ref 70–110)
HCT VFR BLD AUTO: 25.1 % (ref 40–54)
HCT VFR BLD AUTO: 26 % (ref 40–54)
HGB BLD-MCNC: 7.4 G/DL (ref 14–18)
HGB BLD-MCNC: 7.7 G/DL (ref 14–18)
IMM GRANULOCYTES # BLD AUTO: 0.03 K/UL (ref 0–0.04)
IMM GRANULOCYTES # BLD AUTO: 0.05 K/UL (ref 0–0.04)
IMM GRANULOCYTES NFR BLD AUTO: 0.4 % (ref 0–0.5)
IMM GRANULOCYTES NFR BLD AUTO: 0.4 % (ref 0–0.5)
INR PPP: 1.8 (ref 0.8–1.2)
LDH SERPL L TO P-CCNC: 235 U/L (ref 110–260)
LYMPHOCYTES # BLD AUTO: 1.2 K/UL (ref 1–4.8)
LYMPHOCYTES # BLD AUTO: 1.2 K/UL (ref 1–4.8)
LYMPHOCYTES NFR BLD: 11.1 % (ref 18–48)
LYMPHOCYTES NFR BLD: 13.8 % (ref 18–48)
MAGNESIUM SERPL-MCNC: 1.7 MG/DL (ref 1.6–2.6)
MCH RBC QN AUTO: 21.8 PG (ref 27–31)
MCH RBC QN AUTO: 22 PG (ref 27–31)
MCHC RBC AUTO-ENTMCNC: 29.5 G/DL (ref 32–36)
MCHC RBC AUTO-ENTMCNC: 29.6 G/DL (ref 32–36)
MCV RBC AUTO: 74 FL (ref 82–98)
MCV RBC AUTO: 74 FL (ref 82–98)
MONOCYTES # BLD AUTO: 0.7 K/UL (ref 0.3–1)
MONOCYTES # BLD AUTO: 0.9 K/UL (ref 0.3–1)
MONOCYTES NFR BLD: 8.1 % (ref 4–15)
MONOCYTES NFR BLD: 8.3 % (ref 4–15)
NEUTROPHILS # BLD AUTO: 6.4 K/UL (ref 1.8–7.7)
NEUTROPHILS # BLD AUTO: 8.9 K/UL (ref 1.8–7.7)
NEUTROPHILS NFR BLD: 76.5 % (ref 38–73)
NEUTROPHILS NFR BLD: 79.6 % (ref 38–73)
NRBC BLD-RTO: 0 /100 WBC
NRBC BLD-RTO: 0 /100 WBC
PHOSPHATE SERPL-MCNC: 3 MG/DL (ref 2.7–4.5)
PLATELET # BLD AUTO: 230 K/UL (ref 150–450)
PLATELET # BLD AUTO: 278 K/UL (ref 150–450)
PMV BLD AUTO: 10.4 FL (ref 9.2–12.9)
PMV BLD AUTO: 9.9 FL (ref 9.2–12.9)
POCT GLUCOSE: 143 MG/DL (ref 70–110)
POCT GLUCOSE: 147 MG/DL (ref 70–110)
POCT GLUCOSE: 162 MG/DL (ref 70–110)
POCT GLUCOSE: 178 MG/DL (ref 70–110)
POCT GLUCOSE: 226 MG/DL (ref 70–110)
POCT GLUCOSE: 250 MG/DL (ref 70–110)
POTASSIUM SERPL-SCNC: 3.7 MMOL/L (ref 3.5–5.1)
PROTHROMBIN TIME: 19.3 SEC (ref 9–12.5)
RBC # BLD AUTO: 3.4 M/UL (ref 4.6–6.2)
RBC # BLD AUTO: 3.5 M/UL (ref 4.6–6.2)
SODIUM SERPL-SCNC: 133 MMOL/L (ref 136–145)
WBC # BLD AUTO: 11.12 K/UL (ref 3.9–12.7)
WBC # BLD AUTO: 8.33 K/UL (ref 3.9–12.7)

## 2024-09-28 PROCEDURE — 84100 ASSAY OF PHOSPHORUS: CPT | Performed by: PHYSICIAN ASSISTANT

## 2024-09-28 PROCEDURE — 83735 ASSAY OF MAGNESIUM: CPT | Performed by: PHYSICIAN ASSISTANT

## 2024-09-28 PROCEDURE — 85730 THROMBOPLASTIN TIME PARTIAL: CPT | Performed by: PHYSICIAN ASSISTANT

## 2024-09-28 PROCEDURE — 85025 COMPLETE CBC W/AUTO DIFF WBC: CPT | Mod: 91 | Performed by: INTERNAL MEDICINE

## 2024-09-28 PROCEDURE — 25000003 PHARM REV CODE 250: Performed by: INTERNAL MEDICINE

## 2024-09-28 PROCEDURE — 93750 INTERROGATION VAD IN PERSON: CPT | Mod: ,,, | Performed by: INTERNAL MEDICINE

## 2024-09-28 PROCEDURE — 63600175 PHARM REV CODE 636 W HCPCS: Performed by: PHYSICIAN ASSISTANT

## 2024-09-28 PROCEDURE — 85610 PROTHROMBIN TIME: CPT | Performed by: PHYSICIAN ASSISTANT

## 2024-09-28 PROCEDURE — 80048 BASIC METABOLIC PNL TOTAL CA: CPT | Performed by: PHYSICIAN ASSISTANT

## 2024-09-28 PROCEDURE — 99232 SBSQ HOSP IP/OBS MODERATE 35: CPT | Mod: ,,, | Performed by: NURSE PRACTITIONER

## 2024-09-28 PROCEDURE — 83615 LACTATE (LD) (LDH) ENZYME: CPT | Performed by: PHYSICIAN ASSISTANT

## 2024-09-28 PROCEDURE — 20600001 HC STEP DOWN PRIVATE ROOM

## 2024-09-28 PROCEDURE — 99232 SBSQ HOSP IP/OBS MODERATE 35: CPT | Mod: ,,, | Performed by: STUDENT IN AN ORGANIZED HEALTH CARE EDUCATION/TRAINING PROGRAM

## 2024-09-28 PROCEDURE — 63600175 PHARM REV CODE 636 W HCPCS: Performed by: INTERNAL MEDICINE

## 2024-09-28 PROCEDURE — 25000003 PHARM REV CODE 250: Performed by: PHYSICIAN ASSISTANT

## 2024-09-28 PROCEDURE — 27000248 HC VAD-ADDITIONAL DAY

## 2024-09-28 PROCEDURE — 63700000 PHARM REV CODE 250 ALT 637 W/O HCPCS: Performed by: PHYSICIAN ASSISTANT

## 2024-09-28 RX ADMIN — FUROSEMIDE 80 MG: 10 INJECTION, SOLUTION INTRAVENOUS at 09:09

## 2024-09-28 RX ADMIN — PANTOPRAZOLE SODIUM 40 MG: 40 TABLET, DELAYED RELEASE ORAL at 09:09

## 2024-09-28 RX ADMIN — DULOXETINE HYDROCHLORIDE 30 MG: 30 CAPSULE, DELAYED RELEASE ORAL at 09:09

## 2024-09-28 RX ADMIN — POTASSIUM CHLORIDE 30 MEQ: 750 CAPSULE, EXTENDED RELEASE ORAL at 09:09

## 2024-09-28 RX ADMIN — Medication 400 MG: at 09:09

## 2024-09-28 RX ADMIN — AMIODARONE HYDROCHLORIDE 200 MG: 200 TABLET ORAL at 09:09

## 2024-09-28 RX ADMIN — FLUCONAZOLE 400 MG: 100 TABLET ORAL at 10:09

## 2024-09-28 RX ADMIN — GABAPENTIN 100 MG: 100 CAPSULE ORAL at 09:09

## 2024-09-28 RX ADMIN — LEVETIRACETAM 1500 MG: 500 TABLET, FILM COATED ORAL at 09:09

## 2024-09-28 RX ADMIN — INSULIN ASPART 10 UNITS: 100 INJECTION, SOLUTION INTRAVENOUS; SUBCUTANEOUS at 07:09

## 2024-09-28 RX ADMIN — INSULIN ASPART 2 UNITS: 100 INJECTION, SOLUTION INTRAVENOUS; SUBCUTANEOUS at 08:09

## 2024-09-28 RX ADMIN — FUROSEMIDE 80 MG: 10 INJECTION, SOLUTION INTRAVENOUS at 03:09

## 2024-09-28 RX ADMIN — INSULIN ASPART 2 UNITS: 100 INJECTION, SOLUTION INTRAVENOUS; SUBCUTANEOUS at 12:09

## 2024-09-28 RX ADMIN — CEFAZOLIN 6 G: 10 INJECTION, POWDER, FOR SOLUTION INTRAVENOUS at 09:09

## 2024-09-28 RX ADMIN — AMLODIPINE BESYLATE 5 MG: 5 TABLET ORAL at 09:09

## 2024-09-28 RX ADMIN — ATORVASTATIN CALCIUM 40 MG: 20 TABLET, FILM COATED ORAL at 09:09

## 2024-09-28 RX ADMIN — INSULIN ASPART 2 UNITS: 100 INJECTION, SOLUTION INTRAVENOUS; SUBCUTANEOUS at 09:09

## 2024-09-28 RX ADMIN — INSULIN ASPART 10 UNITS: 100 INJECTION, SOLUTION INTRAVENOUS; SUBCUTANEOUS at 12:09

## 2024-09-28 RX ADMIN — EPLERENONE 25 MG: 25 TABLET, FILM COATED ORAL at 09:09

## 2024-09-28 RX ADMIN — GABAPENTIN 400 MG: 400 CAPSULE ORAL at 09:09

## 2024-09-28 RX ADMIN — INSULIN GLARGINE 18 UNITS: 100 INJECTION, SOLUTION SUBCUTANEOUS at 09:09

## 2024-09-28 RX ADMIN — WARFARIN SODIUM 1.5 MG: 1 TABLET ORAL at 04:09

## 2024-09-28 RX ADMIN — INSULIN ASPART 10 UNITS: 100 INJECTION, SOLUTION INTRAVENOUS; SUBCUTANEOUS at 05:09

## 2024-09-28 NOTE — ASSESSMENT & PLAN NOTE
-Rectal bleeding x 1 PTA.  External hemorrhoid noted by mother that she then reduced   -Multiple bloody BM during admission   -H/H trened down requiring 2u pRBC  -GI consulted, s/p Colonoscopy 9/27 w/ clipping of bleeding rectal ulcer  - H/H since stablized and no evidence of bleeding

## 2024-09-28 NOTE — PLAN OF CARE
AAOX4,VSS,O2 sats>92% on room air .Plan of care discussed with patient.Patient has no complaints of pain/SOB. Discussed medications and care. Patient has no questions at this time.Pt visualized and stable.Call light within reach.Pt resting,bed at lowest position.    Problem: Adult Inpatient Plan of Care  Goal: Plan of Care Review  Outcome: Progressing  Goal: Patient-Specific Goal (Individualized)  Outcome: Progressing  Goal: Absence of Hospital-Acquired Illness or Injury  Outcome: Progressing  Goal: Optimal Comfort and Wellbeing  Outcome: Progressing  Goal: Readiness for Transition of Care  Outcome: Progressing     Problem: Diabetes Comorbidity  Goal: Blood Glucose Level Within Targeted Range  Outcome: Progressing     Problem: Sepsis/Septic Shock  Goal: Optimal Coping  Outcome: Progressing  Goal: Absence of Bleeding  Outcome: Progressing  Goal: Blood Glucose Level Within Targeted Range  Outcome: Progressing  Goal: Absence of Infection Signs and Symptoms  Outcome: Progressing  Goal: Optimal Nutrition Intake  Outcome: Progressing     Problem: Infection  Goal: Absence of Infection Signs and Symptoms  Outcome: Progressing     Problem: Coping Ineffective  Goal: Effective Coping  Outcome: Progressing     Problem: Fall Injury Risk  Goal: Absence of Fall and Fall-Related Injury  Outcome: Progressing     Problem: Ventricular Assist Device  Goal: Optimal Adjustment to Device  Outcome: Progressing  Goal: Absence of Bleeding  Outcome: Progressing  Goal: Absence of Embolism Signs and Symptoms  Outcome: Progressing  Goal: Optimal Blood Flow  Outcome: Progressing  Goal: Absence of Infection Signs and Symptoms  Outcome: Progressing  Goal: Effective Right-Sided Heart Function  Outcome: Progressing     Problem: Skin Injury Risk Increased  Goal: Skin Health and Integrity  Outcome: Progressing     Problem: Gastrointestinal Bleeding  Goal: Optimal Coping with Acute Illness  Outcome: Progressing  Goal: Hemostasis  Outcome: Progressing

## 2024-09-28 NOTE — PLAN OF CARE
Alert and oriented, denies pain, no s/s of distress, responds to voice, plan of care explained to patient, educated about diabetic diet, high blood glucose, comfort measures given, VAD hum present, VAD numbers and BP WNL, will continue to monitor.    Problem: Adult Inpatient Plan of Care  Goal: Plan of Care Review  Outcome: Progressing  Goal: Absence of Hospital-Acquired Illness or Injury  Outcome: Progressing  Goal: Optimal Comfort and Wellbeing  Outcome: Progressing  Goal: Readiness for Transition of Care  Outcome: Progressing     Problem: Diabetes Comorbidity  Goal: Blood Glucose Level Within Targeted Range  Outcome: Progressing     Problem: Sepsis/Septic Shock  Goal: Blood Glucose Level Within Targeted Range  Outcome: Progressing     Problem: Infection  Goal: Absence of Infection Signs and Symptoms  Outcome: Progressing     Problem: Fall Injury Risk  Goal: Absence of Fall and Fall-Related Injury  Outcome: Progressing     Problem: Ventricular Assist Device  Goal: Absence of Infection Signs and Symptoms  Outcome: Progressing     Problem: Gastrointestinal Bleeding  Goal: Optimal Coping with Acute Illness  Outcome: Progressing

## 2024-09-28 NOTE — ASSESSMENT & PLAN NOTE
-HeartMate 3 Implanted 6/23/2022 with early RV failure requiring RVAD with ProTek Duo   -Hold Coumadin,  Goal INR 2.0-3.0 . Subtherapeutic today, but actively bleeding   -Antiplatelets ASA 81 mg, on hold with GIB  -LDH is stable overall today. Will continue to monitor daily.  -Speed set at 5100, LSL 4700 rpm  -Interrogation notable for no events  -Not listed for OHTx          Procedure: Device Interrogation Including analysis of device parameters  Current Settings: Ventricular Assist Device  Review of device function is stable/unstable stable        9/28/2024    12:10 PM 9/28/2024     9:00 AM 9/28/2024     4:55 AM 9/27/2024    11:55 PM 9/27/2024     8:38 PM 9/27/2024    12:00 PM 9/27/2024     9:31 AM   TXP LVAD INTERROGATIONS   Type HeartMate3 HeartMate3 HeartMate3 HeartMate3 HeartMate3 HeartMate3 HeartMate3   Flow 4.2 3.9 3.9 4.1 4.3 4.2 4.1   Speed 5100 5150 5100 5100 5100 5100 5100   PI 5.4 5.8 5.8 5.5 4.8 5.1 5.4   Power (Serna) 3.6 3.6 3.5 3.6 3.7 3.6 3.6   LSL 4700 4700 4700 4700 4700 4700 4700   Pulsatility Intermittent pulse Intermittent pulse No Pulse No Pulse Intermittent pulse Intermittent pulse Intermittent pulse

## 2024-09-28 NOTE — ASSESSMENT & PLAN NOTE
Cparapsilosis fungemia    I independently reviewed patient's lab work and images as documented. 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 S/p EGD/Cscope, noted to have rectal ulcer s/p clip. No leukocytosis on lab work.    Recommendations:  -continue cefazolin 6g continuous infusion iv daily, maria isabel 10/9 for 6w course with transition back to suppressive cefadroxil after completion of iv abx  -continue fluconazole 400 mg daily, maria isabel 10/16 for 6w course from cleared blood culture   -monitor QTC and DDI  -please see prior opat note on 9/7 for discharge planning

## 2024-09-28 NOTE — PLAN OF CARE
Problem: Adult Inpatient Plan of Care  Goal: Absence of Hospital-Acquired Illness or Injury  Outcome: Progressing  Goal: Optimal Comfort and Wellbeing  Outcome: Progressing     Problem: Sepsis/Septic Shock  Goal: Absence of Bleeding  Outcome: Progressing  Goal: Optimal Nutrition Intake  Outcome: Progressing     Problem: Fall Injury Risk  Goal: Absence of Fall and Fall-Related Injury  Outcome: Progressing     Problem: Ventricular Assist Device  Goal: Optimal Adjustment to Device  Outcome: Progressing  Goal: Absence of Bleeding  Outcome: Progressing

## 2024-09-28 NOTE — ASSESSMENT & PLAN NOTE
BG goal: 140-180  T2DM.  History of LVAD.      - Lantus 18 units BID   - Continue Novolog 10 units TID with meals   - Novolog /25  - POCT Glucose before meals and at bedtime  - Hypoglycemia protocol in place      ** Please notify Endocrine for any change and/or advance in diet**  ** Please call Endocrine for any BG related issues **     Discharge Planning:   TBD. Please notify endocrinology prior to discharge.

## 2024-09-28 NOTE — PROGRESS NOTES
09/28/2024  Michael Montes    Current provider:  Dalia Crum MD    Device interrogation:      9/28/2024     4:55 AM 9/27/2024    11:55 PM 9/27/2024     8:38 PM 9/27/2024    12:00 PM 9/27/2024     9:31 AM 9/27/2024     3:44 AM 9/26/2024    11:06 PM   TXP LVAD INTERROGATIONS   Type HeartMate3 HeartMate3 HeartMate3 HeartMate3 HeartMate3 HeartMate3 HeartMate3   Flow 3.9 4.1 4.3 4.2 4.1 4.2 4.3   Speed 5100 5100 5100 5100 5100 5100 5100   PI 5.8 5.5 4.8 5.1 5.4 4.8 5.3   Power (Serna) 3.5 3.6 3.7 3.6 3.6 3.6 3.5   LSL 4700 4700 4700 4700 4700 4700 4700   Pulsatility No Pulse No Pulse Intermittent pulse Intermittent pulse Intermittent pulse Intermittent pulse Intermittent pulse          Rounded on Kevan Queen to ensure all mechanical assist device settings (IABP or VAD) were appropriate and all parameters were within limits.  I was able to ensure all back up equipment was present, the staff had no issues, and the Perfusion Department daily rounding was complete.      For implantable VADs: Interrogation of Ventricular assist device was performed with analysis of device parameters and review of device function. I have personally reviewed the interrogation findings and agree with findings as stated.     In emergency, the nursing units have been notified to contact the perfusion department either by:  Calling [phone] from 630am to 4pm Mon thru Fri, utilizing the On-Call Finder functionality of Epic and searching for Perfusion, or by contacting the hospital  from 4pm to 630am and on weekends and asking to speak with the perfusionist on call.    10:07 AM

## 2024-09-28 NOTE — PROGRESS NOTES
"[facility] - [facility] Step[facility]  Endocrinology  Progress Note    Admit Date: 2024     Reason for Consult: Management of T2DM, Hyperglycemia      Surgical Procedure and Date: LVAD 2022      Diabetes diagnosis year:      Home Diabetes Medications:  Levemir 18 units twice daily and Novolog 10 units SSI (150/25) per patient  Last recs from recent admit,.   Lantus 12 units twice daily  - Novolog 8 units TID with meals  Add correction scale if needed.  Blood sugar 150 to 200 add 2 units  Blood sugar 201 to 250 add 4 units  Blood sugar 251 to 300 add 6 units  Blood sugar 301 to 350 add 8 units  Blood sugar greater than 350 add 10 units     How often checking glucose at home?  1-3x day  BG readings on regimen: 100s  Hypoglycemia on the regimen?  Not recently  Missed doses on regimen?  Yes     Diabetes Complications include:   Hyperglycemia     Complicating diabetes co morbidities:   History of MI, CHF, and CKD        HPI:   Patient is a 39 y.o. male with stage D CHF due to NICM, polysubstance abuse, DM, underwent DT-HM3 implantation 2022 with early RV failure requiring RVAD with ProTek Duo after admitted with ADHF/cardiogenic shock on home milrinone requiring IABP. He underwent RVAD removal and chest closure 2022.  Presented to the ED with c/o rectal bleeding.  Endocrine consulted for BG management              Interval HPI:   Overnight events: Remains in CSU. BG varaible on current SQ insulin regimen. Novolog held yesterday evening with dinner. Diet Cardiac Cardiac (Low Na/Chol); Fluid - 2000mL    Eatin%  Nausea: No  Hypoglycemia and intervention: No  Fever: No  TPN and/or TF: No  If yes, type of TF/TPN and rate: n/a    BP (!) 82/0 (BP Location: Left arm, Patient Position: Lying)   Pulse 79   Temp 98.1 °F (36.7 °C) (Oral)   Resp 20   Ht 6' 3" (1.905 m)   Wt 67.6 kg (149 lb 0.5 oz)   SpO2 100%   BMI 18.63 kg/m²     Labs Reviewed and Include    Recent Labs   Lab 24  0517   GLU " "162*   CALCIUM 9.0   *   K 3.7   CO2 30*   CL 96   BUN 14   CREATININE 0.9     Lab Results   Component Value Date    WBC 8.33 09/28/2024    HGB 7.4 (L) 09/28/2024    HCT 25.1 (L) 09/28/2024    MCV 74 (L) 09/28/2024     09/28/2024     No results for input(s): "TSH", "FREET4" in the last 168 hours.  Lab Results   Component Value Date    HGBA1C 10.3 (H) 08/25/2024       Nutritional status:   Body mass index is 18.63 kg/m².  Lab Results   Component Value Date    ALBUMIN 2.5 (L) 09/27/2024    ALBUMIN 2.3 (L) 09/25/2024    ALBUMIN 2.5 (L) 09/23/2024     Lab Results   Component Value Date    PREALBUMIN 14 (L) 09/27/2024    PREALBUMIN 12 (L) 09/25/2024    PREALBUMIN 11 (L) 09/09/2024       Estimated Creatinine Clearance: 105.4 mL/min (based on SCr of 0.9 mg/dL).    Accu-Checks  Recent Labs     09/26/24  0610 09/26/24  1047 09/26/24  1548 09/26/24  1717 09/26/24  1931 09/27/24  0559 09/27/24  1055 09/27/24  1535 09/27/24  2108 09/28/24  0647   POCTGLUCOSE 288* 278* 69* 146* 298* 346* 363* 85 298* 162*       Current Medications and/or Treatments Impacting Glycemic Control  Immunotherapy:    Immunosuppressants       None          Steroids:   Hormones (From admission, onward)      None          Pressors:    Autonomic Drugs (From admission, onward)      None          Hyperglycemia/Diabetes Medications:   Antihyperglycemics (From admission, onward)      Start     Stop Route Frequency Ordered    09/27/24 0900  insulin glargine U-100 (Lantus) pen 18 Units         -- SubQ 2 times daily 09/27/24 0842    09/26/24 1645  insulin aspart U-100 pen 10 Units         -- SubQ 3 times daily with meals 09/26/24 1343    09/23/24 1409  insulin aspart U-100 pen 0-10 Units         -- SubQ Before meals & nightly PRN 09/23/24 1309            ASSESSMENT and PLAN    Cardiac/Vascular  LVAD (left ventricular assist device) present  Managed by primary team  Optimize BG control      Endocrine  Type 2 diabetes mellitus with hyperglycemia  BG " goal: 140-180  T2DM.  History of LVAD.      - Lantus 18 units BID   - Continue Novolog 10 units TID with meals   - Novolog /25  - POCT Glucose before meals and at bedtime  - Hypoglycemia protocol in place      ** Please notify Endocrine for any change and/or advance in diet**  ** Please call Endocrine for any BG related issues **     Discharge Planning:   TBD. Please notify endocrinology prior to discharge.      GI  * Hematochezia  Managed per primary team  Avoid hypoglycemia        Rectal bleeding    Managed by primary team  Optimize BG control        Jody Starkey, NP  Endocrinology  [facility] - [facility]

## 2024-09-28 NOTE — SUBJECTIVE & OBJECTIVE
Interval History:  No events overnight or new complaints this AM. Stable from bleeding standpoint w/ stable H/H with INR up trending to 1.8. Diuresing well Cvp down to 13.    Continuous Infusions:   0.9% NaCl   Intravenous Continuous         Scheduled Meds:   alteplase  2 mg Intra-Catheter Once    amiodarone  200 mg Oral Daily    amLODIPine  5 mg Oral Daily    atorvastatin  40 mg Oral Daily    ceFAZolin (ANCEF) 6 g in D5W 500 mL CONTINUOUS INFUSION  6 g Intravenous Q24H    DULoxetine  30 mg Oral Daily    eplerenone  25 mg Oral Daily    fluconazole  400 mg Oral Daily    furosemide (LASIX) injection  80 mg Intravenous TID    gabapentin  100 mg Oral Daily    And    gabapentin  400 mg Oral QHS    hydrocortisone  25 mg Rectal BID    insulin aspart U-100  10 Units Subcutaneous TIDWM    insulin glargine U-100  18 Units Subcutaneous BID    levETIRAcetam  1,500 mg Oral BID    magnesium oxide  400 mg Oral Daily    pantoprazole  40 mg Oral Daily    potassium chloride  30 mEq Oral Daily    sodium chloride 0.9%  250 mL Intravenous Once    warfarin  1.5 mg Oral Daily     PRN Meds:  Current Facility-Administered Medications:     0.9%  NaCl infusion (for blood administration), , Intravenous, Q24H PRN    0.9%  NaCl infusion (for blood administration), , Intravenous, Q24H PRN    acetaminophen, 650 mg, Oral, Q6H PRN    cyclobenzaprine, 5 mg, Oral, Daily PRN    dextrose 10%, 12.5 g, Intravenous, PRN    dextrose 10%, 12.5 g, Intravenous, PRN    dextrose 10%, 25 g, Intravenous, PRN    dextrose 10%, 25 g, Intravenous, PRN    glucagon (human recombinant), 1 mg, Intramuscular, PRN    glucagon (human recombinant), 1 mg, Intramuscular, PRN    glucose, 16 g, Oral, PRN    glucose, 24 g, Oral, PRN    HYDROmorphone, 0.2 mg, Intravenous, Q5 Min PRN    insulin aspart U-100, 0-10 Units, Subcutaneous, QID (AC + HS) PRN    ondansetron, 4 mg, Oral, Q8H PRN    ondansetron, 4 mg, Intravenous, Daily PRN    Review of patient's allergies indicates:    Allergen Reactions    Torsemide Hives     Objective:     Vital Signs (Most Recent):  Temp: 98.2 °F (36.8 °C) (09/28/24 1136)  Pulse: 84 (09/28/24 1210)  Resp: 18 (09/28/24 1136)  BP: (!) 78/0 (09/28/24 1210)  SpO2: 100 % (09/28/24 1136) Vital Signs (24h Range):  Temp:  [97.7 °F (36.5 °C)-99.1 °F (37.3 °C)] 98.2 °F (36.8 °C)  Pulse:  [79-90] 84  Resp:  [16-20] 18  SpO2:  [99 %-100 %] 100 %  BP: (76-82)/(0) 78/0     Patient Vitals for the past 72 hrs (Last 3 readings):   Weight   09/27/24 0915 67.6 kg (149 lb 0.5 oz)     Body mass index is 18.63 kg/m².      Intake/Output Summary (Last 24 hours) at 9/28/2024 1308  Last data filed at 9/28/2024 1152  Gross per 24 hour   Intake 736 ml   Output 3000 ml   Net -2264 ml       Hemodynamic Parameters:  CVP:  [13 mmHg] 13 mmHg           Physical Exam  Vitals reviewed.   HENT:      Head: Normocephalic.      Nose: Nose normal.   Eyes:      Conjunctiva/sclera: Conjunctivae normal.   Cardiovascular:      Rate and Rhythm: Normal rate and regular rhythm.      Comments: Smooth VAD hum   Pulmonary:      Effort: Pulmonary effort is normal.   Abdominal:      General: Abdomen is flat.      Tenderness: There is no abdominal tenderness.   Musculoskeletal:         General: Normal range of motion.      Cervical back: Normal range of motion.   Skin:     General: Skin is warm.      Capillary Refill: Capillary refill takes less than 2 seconds.   Neurological:      General: No focal deficit present.      Mental Status: He is alert.   Psychiatric:         Mood and Affect: Mood normal.         Behavior: Behavior normal.         Thought Content: Thought content normal.         Judgment: Judgment normal.            Significant Labs:  CBC:  Recent Labs   Lab 09/26/24  1728 09/27/24  0423 09/28/24  0517   WBC 10.31 8.05 8.33   RBC 3.30* 3.55* 3.40*   HGB 7.3* 7.5* 7.4*   HCT 23.8* 26.2* 25.1*    237 230   MCV 72* 74* 74*   MCH 22.1* 21.1* 21.8*   MCHC 30.7* 28.6* 29.5*     BNP:  Recent Labs    Lab 09/25/24 0359 09/27/24 0423   * 385*     CMP:  Recent Labs   Lab 09/23/24  0205 09/24/24  0312 09/25/24 0359 09/26/24 0409 09/27/24 0423 09/28/24  0517   GLU  --    < > 139* 166* 182* 162*   CALCIUM 8.5   < > 8.7 9.1 9.2 9.0   ALBUMIN 2.5*  --  2.3*  --  2.5*  --    PROT  --   --  7.7  --  8.3  --    *   < > 136 136 134* 133*   K 3.3*   < > 3.3* 3.5 3.7 3.7   CO2 28   < > 31* 29 29 30*   CL 93*   < > 95 96 97 96   BUN 13.0   < > 10 13 15 14   CREATININE 1.33*   < > 0.9 1.0 1.1 0.9   ALKPHOS 264*  --  230*  --  269*  --    ALT 12  --  6*  --  5*  --    AST 40*  --  28  --  35  --    BILITOT 1.3  --  2.0*  --  1.3*  --     < > = values in this interval not displayed.      Coagulation:   Recent Labs   Lab 09/26/24 0409 09/27/24 0423 09/28/24  0517   INR 1.9* 1.7* 1.8*   APTT 34.7* 32.8* 34.1*     LDH:  Recent Labs   Lab 09/26/24 0409 09/27/24 0423 09/28/24  0517   * 251 235     Microbiology:  Microbiology Results (last 7 days)       ** No results found for the last 168 hours. **            I have reviewed all pertinent labs within the past 24 hours.    Estimated Creatinine Clearance: 105.4 mL/min (based on SCr of 0.9 mg/dL).    Diagnostic Results:  I have reviewed all pertinent imaging results/findings within the past 24 hours.

## 2024-09-28 NOTE — PROGRESS NOTES
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 S/p EGD/Cscope, noted to have rectal ulcer s/p clip. No leukocytosis on lab work.    Recommendations:  -continue cefazolin 6g continuous infusion iv daily, maria isabel 10/9 for 6w course with transition back to suppressive cefadroxil after completion of iv abx  -continue fluconazole 400 mg daily, maria isabel 10/16 for 6w course from cleared blood culture   -monitor QTC and DDI  -please see prior opat note on 9/7 for discharge planning            Thank you for your consult. Will sign off, please call with questions, new culture data or clinical changes.         Laura Baldwin MD  Infectious Disease  [facility] - Cardiology Stepdown    Subjective:     Principal Problem:Hematochezia    HPI: 38 yo male with HM3 as DT c/b recurrent DLESI with recent admission for MSSA DLESI/bacteremia and Cparapsilosis fungemia (maintained on prolong course of cefazolin/fluc) admitted for BRBPR. Pt reported having constipation prior to admission with associated straining with subsequent BRPBR. He denied fevers, chills, issues with PICC or IV abx. Recently restarted taking fluconazole after prescription was re-sent. Pt is currently on cefazolin and fluconazole. Plan for cscope tomorrow.    Interval History:No fevers documented overnight. No issues.     Review of  Systems   Constitutional:  Negative for chills and fever.   Gastrointestinal:  Negative for blood in stool.   All other systems reviewed and are negative.    Objective:     Vital Signs (Most Recent):  Temp: 98.2 °F (36.8 °C) (09/28/24 1136)  Pulse: 86 (09/28/24 1158)  Resp: 18 (09/28/24 1136)  BP: (!) 78/0 (09/28/24 1210)  SpO2: 100 % (09/28/24 1136) Vital Signs (24h Range):  Temp:  [97.7 °F (36.5 °C)-99.1 °F (37.3 °C)] 98.2 °F (36.8 °C)  Pulse:  [79-90] 86  Resp:  [16-20] 18  SpO2:  [99 %-100 %] 100 %  BP: (76-82)/(0) 78/0     Weight: 67.6 kg (149 lb 0.5 oz)  Body mass index is 18.63 kg/m².    Estimated Creatinine Clearance: 105.4 mL/min (based on SCr of 0.9 mg/dL).     Physical Exam  Constitutional:       General: He is not in acute distress.     Appearance: He is not ill-appearing or toxic-appearing.   Eyes:      General:         Right eye: No discharge.         Left eye: No discharge.   Pulmonary:      Effort: Pulmonary effort is normal. No respiratory distress.   Abdominal:      Comments: LVAD dressed   Musculoskeletal:      Right lower leg: No edema.      Left lower leg: No edema.   Skin:     General: Skin is warm and dry.      Comments: R scl tunneled picc   Neurological:      Mental Status: He is alert and oriented to person, place, and time.          Significant Labs:   Microbiology Results (last 7 days)       ** No results found for the last 168 hours. **            Significant Imaging: I have reviewed all pertinent imaging results/findings within the past 24 hours.

## 2024-09-28 NOTE — PROGRESS NOTES
[facility] - Cardiology Stepdown  Heart Transplant  Progress Note    Patient Name: Kevan Queen  MRN: [id number]  Admission Date: 9/23/2024  Hospital Length of Stay: 5 days  Attending Physician: Dalia Crum MD  Primary Care Provider: Rosio Armendariz FNP  Principal Problem:Hematochezia    Subjective:   Interval History:  No events overnight or new complaints this AM. Stable from bleeding standpoint w/ stable H/H with INR up trending to 1.8. Diuresing well Cvp down to 13.    Continuous Infusions:   0.9% NaCl   Intravenous Continuous         Scheduled Meds:   alteplase  2 mg Intra-Catheter Once    amiodarone  200 mg Oral Daily    amLODIPine  5 mg Oral Daily    atorvastatin  40 mg Oral Daily    ceFAZolin (ANCEF) 6 g in D5W 500 mL CONTINUOUS INFUSION  6 g Intravenous Q24H    DULoxetine  30 mg Oral Daily    eplerenone  25 mg Oral Daily    fluconazole  400 mg Oral Daily    furosemide (LASIX) injection  80 mg Intravenous TID    gabapentin  100 mg Oral Daily    And    gabapentin  400 mg Oral QHS    hydrocortisone  25 mg Rectal BID    insulin aspart U-100  10 Units Subcutaneous TIDWM    insulin glargine U-100  18 Units Subcutaneous BID    levETIRAcetam  1,500 mg Oral BID    magnesium oxide  400 mg Oral Daily    pantoprazole  40 mg Oral Daily    potassium chloride  30 mEq Oral Daily    sodium chloride 0.9%  250 mL Intravenous Once    warfarin  1.5 mg Oral Daily     PRN Meds:  Current Facility-Administered Medications:     0.9%  NaCl infusion (for blood administration), , Intravenous, Q24H PRN    0.9%  NaCl infusion (for blood administration), , Intravenous, Q24H PRN    acetaminophen, 650 mg, Oral, Q6H PRN    cyclobenzaprine, 5 mg, Oral, Daily PRN    dextrose 10%, 12.5 g, Intravenous, PRN    dextrose 10%, 12.5 g, Intravenous, PRN    dextrose 10%, 25 g, Intravenous, PRN    dextrose 10%, 25 g, Intravenous, PRN    glucagon (human recombinant), 1 mg, Intramuscular, PRN    glucagon (human recombinant), 1 mg,  Intramuscular, PRN    glucose, 16 g, Oral, PRN    glucose, 24 g, Oral, PRN    HYDROmorphone, 0.2 mg, Intravenous, Q5 Min PRN    insulin aspart U-100, 0-10 Units, Subcutaneous, QID (AC + HS) PRN    ondansetron, 4 mg, Oral, Q8H PRN    ondansetron, 4 mg, Intravenous, Daily PRN    Review of patient's allergies indicates:   Allergen Reactions    Torsemide Hives     Objective:     Vital Signs (Most Recent):  Temp: 98.2 °F (36.8 °C) (09/28/24 1136)  Pulse: 84 (09/28/24 1210)  Resp: 18 (09/28/24 1136)  BP: (!) 78/0 (09/28/24 1210)  SpO2: 100 % (09/28/24 1136) Vital Signs (24h Range):  Temp:  [97.7 °F (36.5 °C)-99.1 °F (37.3 °C)] 98.2 °F (36.8 °C)  Pulse:  [79-90] 84  Resp:  [16-20] 18  SpO2:  [99 %-100 %] 100 %  BP: (76-82)/(0) 78/0     Patient Vitals for the past 72 hrs (Last 3 readings):   Weight   09/27/24 0915 67.6 kg (149 lb 0.5 oz)     Body mass index is 18.63 kg/m².      Intake/Output Summary (Last 24 hours) at 9/28/2024 1308  Last data filed at 9/28/2024 1152  Gross per 24 hour   Intake 736 ml   Output 3000 ml   Net -2264 ml       Hemodynamic Parameters:  CVP:  [13 mmHg] 13 mmHg           Physical Exam  Vitals reviewed.   HENT:      Head: Normocephalic.      Nose: Nose normal.   Eyes:      Conjunctiva/sclera: Conjunctivae normal.   Cardiovascular:      Rate and Rhythm: Normal rate and regular rhythm.      Comments: Smooth VAD hum   Pulmonary:      Effort: Pulmonary effort is normal.   Abdominal:      General: Abdomen is flat.      Tenderness: There is no abdominal tenderness.   Musculoskeletal:         General: Normal range of motion.      Cervical back: Normal range of motion.   Skin:     General: Skin is warm.      Capillary Refill: Capillary refill takes less than 2 seconds.   Neurological:      General: No focal deficit present.      Mental Status: He is alert.   Psychiatric:         Mood and Affect: Mood normal.         Behavior: Behavior normal.         Thought Content: Thought content normal.          Judgment: Judgment normal.            Significant Labs:  CBC:  Recent Labs   Lab 09/26/24  1728 09/27/24 0423 09/28/24  0517   WBC 10.31 8.05 8.33   RBC 3.30* 3.55* 3.40*   HGB 7.3* 7.5* 7.4*   HCT 23.8* 26.2* 25.1*    237 230   MCV 72* 74* 74*   MCH 22.1* 21.1* 21.8*   MCHC 30.7* 28.6* 29.5*     BNP:  Recent Labs   Lab 09/25/24  0359 09/27/24 0423   * 385*     CMP:  Recent Labs   Lab 09/23/24  0205 09/24/24  0312 09/25/24  0359 09/26/24  0409 09/27/24 0423 09/28/24  0517   GLU  --    < > 139* 166* 182* 162*   CALCIUM 8.5   < > 8.7 9.1 9.2 9.0   ALBUMIN 2.5*  --  2.3*  --  2.5*  --    PROT  --   --  7.7  --  8.3  --    *   < > 136 136 134* 133*   K 3.3*   < > 3.3* 3.5 3.7 3.7   CO2 28   < > 31* 29 29 30*   CL 93*   < > 95 96 97 96   BUN 13.0   < > 10 13 15 14   CREATININE 1.33*   < > 0.9 1.0 1.1 0.9   ALKPHOS 264*  --  230*  --  269*  --    ALT 12  --  6*  --  5*  --    AST 40*  --  28  --  35  --    BILITOT 1.3  --  2.0*  --  1.3*  --     < > = values in this interval not displayed.      Coagulation:   Recent Labs   Lab 09/26/24 0409 09/27/24 0423 09/28/24  0517   INR 1.9* 1.7* 1.8*   APTT 34.7* 32.8* 34.1*     LDH:  Recent Labs   Lab 09/26/24 0409 09/27/24 0423 09/28/24  0517   * 251 235     Microbiology:  Microbiology Results (last 7 days)       ** No results found for the last 168 hours. **            I have reviewed all pertinent labs within the past 24 hours.    Estimated Creatinine Clearance: 105.4 mL/min (based on SCr of 0.9 mg/dL).    Diagnostic Results:  I have reviewed all pertinent imaging results/findings within the past 24 hours.  Assessment and Plan:      39 year old male with stage D 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 Familial CM-Father had LVAD and subsequent heart transplant), polysubstance abuse, DM, ICD removal in 11/2023 (eroded, no lifevest due to LVAD), syncope vs seizure (on Keppra), underwent DT-HM3 implantation 6/23/2022 with early RV failure requiring RVAD with ProTek Duo  (RVAD removal and chest closure 6/30/2022).  He was weaned off  but he had to restarted due to RVF and transitioned to milrinone (secondary to  shortage). Most recently admitted on 9/10-9/12 for pyelonephritis with Bcx positive for yeast. Had many GOC discussions but he did not want to be DNR/DNI, and wants to continue aggressive care. He was weaned off milrinone but was discharged with PICC for IV antibiotics. Transferred after presenting to the ED last night after one episode of bright red blood per rectum yesterday evening. Pt states he has had rectal pain for a few days. His mother found an external hemorrhoid and was able to reduce with manual pressure in the rectum. H/H stable. INR 2.1.     Rectal bleeding  -Rectal bleeding x 1 PTA.  External hemorrhoid noted by mother that she then reduced   -Multiple bloody BM during admission   -H/H trened down requiring 2u pRBC  -GI consulted, s/p Colonoscopy 9/27 w/ clipping of bleeding rectal ulcer  - H/H since stablized and no evidence of bleeding    LVAD (left ventricular assist device) present  -HeartMate 3 Implanted 6/23/2022 with early RV failure requiring RVAD with ProTek Duo   -Hold Coumadin,  Goal INR 2.0-3.0 . Subtherapeutic today, but actively bleeding   -Antiplatelets ASA 81 mg, on hold with GIB  -LDH is stable overall today. Will continue to monitor daily.  -Speed set at 5100, LSL 4700 rpm  -Interrogation notable for no events  -Not listed for OHTx          Procedure: Device Interrogation Including analysis of device parameters  Current Settings: Ventricular Assist Device  Review of device function is stable/unstable stable        9/28/2024    12:10 PM 9/28/2024     9:00 AM 9/28/2024     4:55 AM 9/27/2024    11:55 PM 9/27/2024     8:38 PM 9/27/2024    12:00 PM 9/27/2024     9:31 AM   TXP LVAD INTERROGATIONS   Type HeartMate3 HeartMate3 HeartMate3 HeartMate3 HeartMate3 HeartMate3 HeartMate3   Flow 4.2 3.9 3.9 4.1 4.3 4.2 4.1   Speed 5100 5150 5100 5100  5100 5100 5100   PI 5.4 5.8 5.8 5.5 4.8 5.1 5.4   Power (Serna) 3.6 3.6 3.5 3.6 3.7 3.6 3.6   LSL 4700 4700 4700 4700 4700 4700 4700   Pulsatility Intermittent pulse Intermittent pulse No Pulse No Pulse Intermittent pulse Intermittent pulse Intermittent pulse         Type 2 diabetes mellitus with hyperglycemia  -IDDM  -Endocrinology consulted         Jeff Spencer PA-C  Heart Transplant  [facility] - Cardiology Stepdown

## 2024-09-28 NOTE — SUBJECTIVE & OBJECTIVE
"Interval HPI:   Overnight events: Remains in CSU. BG varaible on current SQ insulin regimen. Novolog held yesterday evening with dinner. Diet Cardiac Cardiac (Low Na/Chol); Fluid - 2000mL    Eatin%  Nausea: No  Hypoglycemia and intervention: No  Fever: No  TPN and/or TF: No  If yes, type of TF/TPN and rate: n/a    BP (!) 82/0 (BP Location: Left arm, Patient Position: Lying)   Pulse 79   Temp 98.1 °F (36.7 °C) (Oral)   Resp 20   Ht 6' 3" (1.905 m)   Wt 67.6 kg (149 lb 0.5 oz)   SpO2 100%   BMI 18.63 kg/m²     Labs Reviewed and Include    Recent Labs   Lab 24  0517   *   CALCIUM 9.0   *   K 3.7   CO2 30*   CL 96   BUN 14   CREATININE 0.9     Lab Results   Component Value Date    WBC 8.33 2024    HGB 7.4 (L) 2024    HCT 25.1 (L) 2024    MCV 74 (L) 2024     2024     No results for input(s): "TSH", "FREET4" in the last 168 hours.  Lab Results   Component Value Date    HGBA1C 10.3 (H) 2024       Nutritional status:   Body mass index is 18.63 kg/m².  Lab Results   Component Value Date    ALBUMIN 2.5 (L) 2024    ALBUMIN 2.3 (L) 2024    ALBUMIN 2.5 (L) 2024     Lab Results   Component Value Date    PREALBUMIN 14 (L) 2024    PREALBUMIN 12 (L) 2024    PREALBUMIN 11 (L) 2024       Estimated Creatinine Clearance: 105.4 mL/min (based on SCr of 0.9 mg/dL).    Accu-Checks  Recent Labs     24  0610 24  1047 24  1548 24  1717 24  1931 24  0559 24  1055 24  1535 24  2108 24  0647   POCTGLUCOSE 288* 278* 69* 146* 298* 346* 363* 85 298* 162*       Current Medications and/or Treatments Impacting Glycemic Control  Immunotherapy:    Immunosuppressants       None          Steroids:   Hormones (From admission, onward)      None          Pressors:    Autonomic Drugs (From admission, onward)      None          Hyperglycemia/Diabetes Medications:   Antihyperglycemics (From " admission, onward)      Start     Stop Route Frequency Ordered    09/27/24 0900  insulin glargine U-100 (Lantus) pen 18 Units         -- SubQ 2 times daily 09/27/24 0842    09/26/24 1645  insulin aspart U-100 pen 10 Units         -- SubQ 3 times daily with meals 09/26/24 1343    09/23/24 1409  insulin aspart U-100 pen 0-10 Units         -- SubQ Before meals & nightly PRN 09/23/24 1309

## 2024-09-28 NOTE — SUBJECTIVE & OBJECTIVE
Interval History:No fevers documented overnight. No issues.     Review of Systems   Constitutional:  Negative for chills and fever.   Gastrointestinal:  Negative for blood in stool.   All other systems reviewed and are negative.    Objective:     Vital Signs (Most Recent):  Temp: 98.2 °F (36.8 °C) (09/28/24 1136)  Pulse: 86 (09/28/24 1158)  Resp: 18 (09/28/24 1136)  BP: (!) 78/0 (09/28/24 1210)  SpO2: 100 % (09/28/24 1136) Vital Signs (24h Range):  Temp:  [97.7 °F (36.5 °C)-99.1 °F (37.3 °C)] 98.2 °F (36.8 °C)  Pulse:  [79-90] 86  Resp:  [16-20] 18  SpO2:  [99 %-100 %] 100 %  BP: (76-82)/(0) 78/0     Weight: 67.6 kg (149 lb 0.5 oz)  Body mass index is 18.63 kg/m².    Estimated Creatinine Clearance: 105.4 mL/min (based on SCr of 0.9 mg/dL).     Physical Exam  Constitutional:       General: He is not in acute distress.     Appearance: He is not ill-appearing or toxic-appearing.   Eyes:      General:         Right eye: No discharge.         Left eye: No discharge.   Pulmonary:      Effort: Pulmonary effort is normal. No respiratory distress.   Abdominal:      Comments: LVAD dressed   Musculoskeletal:      Right lower leg: No edema.      Left lower leg: No edema.   Skin:     General: Skin is warm and dry.      Comments: R scl tunneled picc   Neurological:      Mental Status: He is alert and oriented to person, place, and time.          Significant Labs:   Microbiology Results (last 7 days)       ** No results found for the last 168 hours. **            Significant Imaging: I have reviewed all pertinent imaging results/findings within the past 24 hours.

## 2024-09-29 VITALS
WEIGHT: 149.06 LBS | HEART RATE: 89 BPM | SYSTOLIC BLOOD PRESSURE: 88 MMHG | OXYGEN SATURATION: 99 % | RESPIRATION RATE: 26 BRPM | BODY MASS INDEX: 18.53 KG/M2 | TEMPERATURE: 98 F | HEIGHT: 75 IN

## 2024-09-29 LAB
ANION GAP SERPL CALC-SCNC: 6 MMOL/L (ref 8–16)
APTT PPP: 33.9 SEC (ref 21–32)
BASOPHILS # BLD AUTO: 0.04 K/UL (ref 0–0.2)
BASOPHILS # BLD AUTO: 0.04 K/UL (ref 0–0.2)
BASOPHILS NFR BLD: 0.5 % (ref 0–1.9)
BASOPHILS NFR BLD: 0.5 % (ref 0–1.9)
BUN SERPL-MCNC: 15 MG/DL (ref 6–20)
CALCIUM SERPL-MCNC: 9.1 MG/DL (ref 8.7–10.5)
CHLORIDE SERPL-SCNC: 94 MMOL/L (ref 95–110)
CO2 SERPL-SCNC: 29 MMOL/L (ref 23–29)
CREAT SERPL-MCNC: 1.2 MG/DL (ref 0.5–1.4)
DIFFERENTIAL METHOD BLD: ABNORMAL
DIFFERENTIAL METHOD BLD: ABNORMAL
EOSINOPHIL # BLD AUTO: 0.1 K/UL (ref 0–0.5)
EOSINOPHIL # BLD AUTO: 0.1 K/UL (ref 0–0.5)
EOSINOPHIL NFR BLD: 0.7 % (ref 0–8)
EOSINOPHIL NFR BLD: 0.7 % (ref 0–8)
ERYTHROCYTE [DISTWIDTH] IN BLOOD BY AUTOMATED COUNT: 27.7 % (ref 11.5–14.5)
ERYTHROCYTE [DISTWIDTH] IN BLOOD BY AUTOMATED COUNT: 27.7 % (ref 11.5–14.5)
EST. GFR  (NO RACE VARIABLE): >60 ML/MIN/1.73 M^2
GLUCOSE SERPL-MCNC: 316 MG/DL (ref 70–110)
HCT VFR BLD AUTO: 26.3 % (ref 40–54)
HCT VFR BLD AUTO: 26.3 % (ref 40–54)
HGB BLD-MCNC: 7.5 G/DL (ref 14–18)
HGB BLD-MCNC: 7.5 G/DL (ref 14–18)
IMM GRANULOCYTES # BLD AUTO: 0.02 K/UL (ref 0–0.04)
IMM GRANULOCYTES # BLD AUTO: 0.02 K/UL (ref 0–0.04)
IMM GRANULOCYTES NFR BLD AUTO: 0.2 % (ref 0–0.5)
IMM GRANULOCYTES NFR BLD AUTO: 0.2 % (ref 0–0.5)
INR PPP: 2.2 (ref 0.8–1.2)
LDH SERPL L TO P-CCNC: 235 U/L (ref 110–260)
LYMPHOCYTES # BLD AUTO: 1.4 K/UL (ref 1–4.8)
LYMPHOCYTES # BLD AUTO: 1.4 K/UL (ref 1–4.8)
LYMPHOCYTES NFR BLD: 17.1 % (ref 18–48)
LYMPHOCYTES NFR BLD: 17.1 % (ref 18–48)
MAGNESIUM SERPL-MCNC: 1.6 MG/DL (ref 1.6–2.6)
MCH RBC QN AUTO: 21.7 PG (ref 27–31)
MCH RBC QN AUTO: 21.7 PG (ref 27–31)
MCHC RBC AUTO-ENTMCNC: 28.5 G/DL (ref 32–36)
MCHC RBC AUTO-ENTMCNC: 28.5 G/DL (ref 32–36)
MCV RBC AUTO: 76 FL (ref 82–98)
MCV RBC AUTO: 76 FL (ref 82–98)
MONOCYTES # BLD AUTO: 0.7 K/UL (ref 0.3–1)
MONOCYTES # BLD AUTO: 0.7 K/UL (ref 0.3–1)
MONOCYTES NFR BLD: 8.3 % (ref 4–15)
MONOCYTES NFR BLD: 8.3 % (ref 4–15)
NEUTROPHILS # BLD AUTO: 6 K/UL (ref 1.8–7.7)
NEUTROPHILS # BLD AUTO: 6 K/UL (ref 1.8–7.7)
NEUTROPHILS NFR BLD: 73.2 % (ref 38–73)
NEUTROPHILS NFR BLD: 73.2 % (ref 38–73)
NRBC BLD-RTO: 0 /100 WBC
NRBC BLD-RTO: 0 /100 WBC
PHOSPHATE SERPL-MCNC: 3.7 MG/DL (ref 2.7–4.5)
PLATELET # BLD AUTO: 249 K/UL (ref 150–450)
PLATELET # BLD AUTO: 249 K/UL (ref 150–450)
PMV BLD AUTO: 10.1 FL (ref 9.2–12.9)
PMV BLD AUTO: 10.1 FL (ref 9.2–12.9)
POCT GLUCOSE: 205 MG/DL (ref 70–110)
POCT GLUCOSE: 280 MG/DL (ref 70–110)
POCT GLUCOSE: 351 MG/DL (ref 70–110)
POCT GLUCOSE: 89 MG/DL (ref 70–110)
POTASSIUM SERPL-SCNC: 3.6 MMOL/L (ref 3.5–5.1)
PROTHROMBIN TIME: 23.1 SEC (ref 9–12.5)
RBC # BLD AUTO: 3.45 M/UL (ref 4.6–6.2)
RBC # BLD AUTO: 3.45 M/UL (ref 4.6–6.2)
SODIUM SERPL-SCNC: 129 MMOL/L (ref 136–145)
WBC # BLD AUTO: 8.15 K/UL (ref 3.9–12.7)
WBC # BLD AUTO: 8.15 K/UL (ref 3.9–12.7)

## 2024-09-29 PROCEDURE — 63700000 PHARM REV CODE 250 ALT 637 W/O HCPCS: Performed by: PHYSICIAN ASSISTANT

## 2024-09-29 PROCEDURE — 83615 LACTATE (LD) (LDH) ENZYME: CPT | Performed by: PHYSICIAN ASSISTANT

## 2024-09-29 PROCEDURE — 80048 BASIC METABOLIC PNL TOTAL CA: CPT | Performed by: PHYSICIAN ASSISTANT

## 2024-09-29 PROCEDURE — 85025 COMPLETE CBC W/AUTO DIFF WBC: CPT | Performed by: INTERNAL MEDICINE

## 2024-09-29 PROCEDURE — 63600175 PHARM REV CODE 636 W HCPCS: Performed by: PHYSICIAN ASSISTANT

## 2024-09-29 PROCEDURE — 85610 PROTHROMBIN TIME: CPT | Performed by: PHYSICIAN ASSISTANT

## 2024-09-29 PROCEDURE — 27000248 HC VAD-ADDITIONAL DAY

## 2024-09-29 PROCEDURE — 25000003 PHARM REV CODE 250: Performed by: INTERNAL MEDICINE

## 2024-09-29 PROCEDURE — 25000003 PHARM REV CODE 250: Performed by: PHYSICIAN ASSISTANT

## 2024-09-29 PROCEDURE — 83735 ASSAY OF MAGNESIUM: CPT | Performed by: PHYSICIAN ASSISTANT

## 2024-09-29 PROCEDURE — 84100 ASSAY OF PHOSPHORUS: CPT | Performed by: PHYSICIAN ASSISTANT

## 2024-09-29 PROCEDURE — 85730 THROMBOPLASTIN TIME PARTIAL: CPT | Performed by: PHYSICIAN ASSISTANT

## 2024-09-29 PROCEDURE — 99232 SBSQ HOSP IP/OBS MODERATE 35: CPT | Mod: ,,, | Performed by: NURSE PRACTITIONER

## 2024-09-29 RX ORDER — POLYETHYLENE GLYCOL 3350 17 G/17G
17 POWDER, FOR SOLUTION ORAL DAILY
Status: DISCONTINUED | OUTPATIENT
Start: 2024-09-29 | End: 2024-09-29 | Stop reason: HOSPADM

## 2024-09-29 RX ORDER — POLYETHYLENE GLYCOL 3350 17 G/17G
17 POWDER, FOR SOLUTION ORAL DAILY
Qty: 30 EACH | Refills: 3 | Status: SHIPPED | OUTPATIENT
Start: 2024-09-30

## 2024-09-29 RX ORDER — AMOXICILLIN 250 MG
1 CAPSULE ORAL DAILY PRN
Qty: 30 TABLET | Refills: 3 | Status: SHIPPED | OUTPATIENT
Start: 2024-09-29

## 2024-09-29 RX ORDER — BUMETANIDE 1 MG/1
2 TABLET ORAL 2 TIMES DAILY
Qty: 120 TABLET | Refills: 11 | Status: SHIPPED | OUTPATIENT
Start: 2024-09-29 | End: 2024-09-30

## 2024-09-29 RX ORDER — MAGNESIUM SULFATE HEPTAHYDRATE 40 MG/ML
2 INJECTION, SOLUTION INTRAVENOUS
Status: COMPLETED | OUTPATIENT
Start: 2024-09-29 | End: 2024-09-29

## 2024-09-29 RX ORDER — AMOXICILLIN 250 MG
1 CAPSULE ORAL DAILY PRN
Status: DISCONTINUED | OUTPATIENT
Start: 2024-09-29 | End: 2024-09-29 | Stop reason: HOSPADM

## 2024-09-29 RX ORDER — LACTULOSE 10 G/15ML
30 SOLUTION ORAL ONCE
Status: COMPLETED | OUTPATIENT
Start: 2024-09-29 | End: 2024-09-29

## 2024-09-29 RX ADMIN — INSULIN ASPART 6 UNITS: 100 INJECTION, SOLUTION INTRAVENOUS; SUBCUTANEOUS at 08:09

## 2024-09-29 RX ADMIN — LACTULOSE 30 G: 20 SOLUTION ORAL at 12:09

## 2024-09-29 RX ADMIN — FUROSEMIDE 80 MG: 10 INJECTION, SOLUTION INTRAVENOUS at 03:09

## 2024-09-29 RX ADMIN — POLYETHYLENE GLYCOL 3350 17 G: 17 POWDER, FOR SOLUTION ORAL at 08:09

## 2024-09-29 RX ADMIN — AMLODIPINE BESYLATE 5 MG: 5 TABLET ORAL at 08:09

## 2024-09-29 RX ADMIN — MAGNESIUM SULFATE 2 G: 2 INJECTION INTRAVENOUS at 12:09

## 2024-09-29 RX ADMIN — GABAPENTIN 100 MG: 100 CAPSULE ORAL at 08:09

## 2024-09-29 RX ADMIN — FLUCONAZOLE 400 MG: 100 TABLET ORAL at 08:09

## 2024-09-29 RX ADMIN — INSULIN GLARGINE 18 UNITS: 100 INJECTION, SOLUTION SUBCUTANEOUS at 08:09

## 2024-09-29 RX ADMIN — MAGNESIUM SULFATE 2 G: 2 INJECTION INTRAVENOUS at 10:09

## 2024-09-29 RX ADMIN — INSULIN ASPART 4 UNITS: 100 INJECTION, SOLUTION INTRAVENOUS; SUBCUTANEOUS at 12:09

## 2024-09-29 RX ADMIN — DULOXETINE HYDROCHLORIDE 30 MG: 30 CAPSULE, DELAYED RELEASE ORAL at 08:09

## 2024-09-29 RX ADMIN — AMIODARONE HYDROCHLORIDE 200 MG: 200 TABLET ORAL at 08:09

## 2024-09-29 RX ADMIN — EPLERENONE 25 MG: 25 TABLET, FILM COATED ORAL at 08:09

## 2024-09-29 RX ADMIN — INSULIN ASPART 10 UNITS: 100 INJECTION, SOLUTION INTRAVENOUS; SUBCUTANEOUS at 08:09

## 2024-09-29 RX ADMIN — PANTOPRAZOLE SODIUM 40 MG: 40 TABLET, DELAYED RELEASE ORAL at 08:09

## 2024-09-29 RX ADMIN — INSULIN ASPART 10 UNITS: 100 INJECTION, SOLUTION INTRAVENOUS; SUBCUTANEOUS at 12:09

## 2024-09-29 RX ADMIN — LEVETIRACETAM 1500 MG: 500 TABLET, FILM COATED ORAL at 08:09

## 2024-09-29 RX ADMIN — POTASSIUM CHLORIDE 30 MEQ: 750 CAPSULE, EXTENDED RELEASE ORAL at 08:09

## 2024-09-29 RX ADMIN — ATORVASTATIN CALCIUM 40 MG: 20 TABLET, FILM COATED ORAL at 08:09

## 2024-09-29 RX ADMIN — Medication 400 MG: at 08:09

## 2024-09-29 RX ADMIN — FUROSEMIDE 80 MG: 10 INJECTION, SOLUTION INTRAVENOUS at 08:09

## 2024-09-29 RX ADMIN — WARFARIN SODIUM 1.5 MG: 1 TABLET ORAL at 05:09

## 2024-09-29 NOTE — PLAN OF CARE
Patient is ready for discharge. Patient stable alert and oriented. IVs removed. No complaints of pain. Discussed discharge plan. Reviewed medications and side effects, appointments, and answered questions with patient and family. Patient being discharged with home antibiotics and subclavian double lumen central line in place. Patient stated he is familiar with the education due to being sent home with IV antibiotics in the past. Education reinforced.      Problem: Adult Inpatient Plan of Care  Goal: Plan of Care Review  Outcome: Met  Goal: Patient-Specific Goal (Individualized)  Outcome: Met  Goal: Absence of Hospital-Acquired Illness or Injury  Outcome: Met  Goal: Optimal Comfort and Wellbeing  Outcome: Met  Goal: Readiness for Transition of Care  Outcome: Met     Problem: Diabetes Comorbidity  Goal: Blood Glucose Level Within Targeted Range  Outcome: Met     Problem: Sepsis/Septic Shock  Goal: Optimal Coping  Outcome: Met  Goal: Absence of Bleeding  Outcome: Met  Goal: Blood Glucose Level Within Targeted Range  Outcome: Met  Goal: Absence of Infection Signs and Symptoms  Outcome: Met  Goal: Optimal Nutrition Intake  Outcome: Met     Problem: Infection  Goal: Absence of Infection Signs and Symptoms  Outcome: Met     Problem: Coping Ineffective  Goal: Effective Coping  Outcome: Met     Problem: Fall Injury Risk  Goal: Absence of Fall and Fall-Related Injury  Outcome: Met     Problem: Ventricular Assist Device  Goal: Optimal Adjustment to Device  Outcome: Met  Goal: Absence of Bleeding  Outcome: Met  Goal: Absence of Embolism Signs and Symptoms  Outcome: Met  Goal: Optimal Blood Flow  Outcome: Met  Goal: Absence of Infection Signs and Symptoms  Outcome: Met  Goal: Effective Right-Sided Heart Function  Outcome: Met     Problem: Skin Injury Risk Increased  Goal: Skin Health and Integrity  Outcome: Met     Problem: Gastrointestinal Bleeding  Goal: Optimal Coping with Acute Illness  Outcome: Met  Goal:  Hemostasis  Outcome: Met

## 2024-09-29 NOTE — PROGRESS NOTES
DISCHARGE    SW to pt's room for discharge today.  Pt presents as AAO x4, calm, cooperative, and asking and answering questions appropriately.  Pt verbalizes understanding and agreement with plan for d/c to home today with HH and IV ABX. LCSW set up Transport to transport to his mother's home in [address]. LCSW called [facility] to confirm the address was [address] [place] [address] with [facility] who states they will pick Pt up at 18:30.Pt reports coping adequately at this time, and denies any needs or concerns to SW.    SW notified [facility] [phone] of d/c today and confirmed they have Epic access.  SW notified [facility] (p: [phone], f: [phone] of d/c today and faxed HH orders & hospital records. LCSW spoke to on call RN who states they will admit Pt tomorrow and that both [place] and [place] locations could accept him. [facility] LCSWs will follow up with HH on 9/30/24. SW providing ongoing psychosocial and counseling support, education, resources, assistance, and discharge planning as indicated.  SW remains available.

## 2024-09-29 NOTE — PLAN OF CARE
IV abx infusing per order. No BM on shift. Plan of care discussed with patient. LVAD DP and numbers WNL, smooth LVAD hum. Patient has no complaints of pain. Discussed medications and care.    Problem: Adult Inpatient Plan of Care  Goal: Plan of Care Review  Outcome: Progressing  Goal: Patient-Specific Goal (Individualized)  Outcome: Progressing  Flowsheets (Taken 9/29/2024 0310)  Individualized Care Needs: LVAD equipment  Anxieties, Fears or Concerns: none stated     Problem: Diabetes Comorbidity  Goal: Blood Glucose Level Within Targeted Range  Outcome: Progressing     Problem: Fall Injury Risk  Goal: Absence of Fall and Fall-Related Injury  Outcome: Progressing     Problem: Ventricular Assist Device  Goal: Absence of Bleeding  Outcome: Progressing  Goal: Absence of Embolism Signs and Symptoms  Outcome: Progressing  Goal: Absence of Infection Signs and Symptoms  Outcome: Progressing     Problem: Skin Injury Risk Increased  Goal: Skin Health and Integrity  Outcome: Progressing     Problem: Gastrointestinal Bleeding  Goal: Optimal Coping with Acute Illness  Outcome: Progressing  Goal: Hemostasis  Outcome: Progressing

## 2024-09-29 NOTE — SUBJECTIVE & OBJECTIVE
"Interval HPI:   Overnight events: Remains in CSU. BG variable on current SQ insulin regimen (143-280). Patient reports eating  cereal overnight likely cause of BG excursions.   Eatin%  Nausea: No  Hypoglycemia and intervention: No  Fever: No  TPN and/or TF: No  If yes, type of TF/TPN and rate: n/a    BP (!) 82/0   Pulse 85   Temp 98.5 °F (36.9 °C) (Oral)   Resp 20   Ht 6' 3" (1.905 m)   Wt 67.6 kg (149 lb 0.5 oz)   SpO2 99%   BMI 18.63 kg/m²     Labs Reviewed and Include    Recent Labs   Lab 24  0508   *   CALCIUM 9.1   *   K 3.6   CO2 29   CL 94*   BUN 15   CREATININE 1.2     Lab Results   Component Value Date    WBC 8.15 2024    WBC 8.15 2024    HGB 7.5 (L) 2024    HGB 7.5 (L) 2024    HCT 26.3 (L) 2024    HCT 26.3 (L) 2024    MCV 76 (L) 2024    MCV 76 (L) 2024     2024     2024     No results for input(s): "TSH", "FREET4" in the last 168 hours.  Lab Results   Component Value Date    HGBA1C 10.3 (H) 2024       Nutritional status:   Body mass index is 18.63 kg/m².  Lab Results   Component Value Date    ALBUMIN 2.5 (L) 2024    ALBUMIN 2.3 (L) 2024    ALBUMIN 2.5 (L) 2024     Lab Results   Component Value Date    PREALBUMIN 14 (L) 2024    PREALBUMIN 12 (L) 2024    PREALBUMIN 11 (L) 2024       Estimated Creatinine Clearance: 79 mL/min (based on SCr of 1.2 mg/dL).    Accu-Checks  Recent Labs     24  1055 24  1535 24  2108 24  0647 24  1054 24  1550 24  1620 24  1910 24  2108 24  0736   POCTGLUCOSE 363* 85 298* 162* 178* 143* 147* 250* 226* 280*       Current Medications and/or Treatments Impacting Glycemic Control  Immunotherapy:    Immunosuppressants       None          Steroids:   Hormones (From admission, onward)      None          Pressors:    Autonomic Drugs (From admission, onward)      None      "     Hyperglycemia/Diabetes Medications:   Antihyperglycemics (From admission, onward)      Start     Stop Route Frequency Ordered    09/27/24 0900  insulin glargine U-100 (Lantus) pen 18 Units         -- SubQ 2 times daily 09/27/24 0842    09/26/24 1645  insulin aspart U-100 pen 10 Units         -- SubQ 3 times daily with meals 09/26/24 1343    09/23/24 1409  insulin aspart U-100 pen 0-10 Units         -- SubQ Before meals & nightly PRN 09/23/24 1309

## 2024-09-29 NOTE — PROGRESS NOTES
"[facility]  Endocrinology  Progress Note    Admit Date: 2024     Reason for Consult: Management of T2DM, Hyperglycemia      Surgical Procedure and Date: LVAD 2022      Diabetes diagnosis year:      Home Diabetes Medications:  Levemir 18 units twice daily and Novolog 10 units SSI (150/25) per patient  Last recs from recent admit,.   Lantus 12 units twice daily  - Novolog 8 units TID with meals  Add correction scale if needed.  Blood sugar 150 to 200 add 2 units  Blood sugar 201 to 250 add 4 units  Blood sugar 251 to 300 add 6 units  Blood sugar 301 to 350 add 8 units  Blood sugar greater than 350 add 10 units     How often checking glucose at home?  1-3x day  BG readings on regimen: 100s  Hypoglycemia on the regimen?  Not recently  Missed doses on regimen?  Yes     Diabetes Complications include:   Hyperglycemia     Complicating diabetes co morbidities:   History of MI, CHF, and CKD        HPI:   Patient is a 39 y.o. male with stage D CHF due to NICM, polysubstance abuse, DM, underwent DT-HM3 implantation 2022 with early RV failure requiring RVAD with ProTek Duo after admitted with ADHF/cardiogenic shock on home milrinone requiring IABP. He underwent RVAD removal and chest closure 2022.  Presented to the ED with c/o rectal bleeding.  Endocrine consulted for BG management              Interval HPI:   Overnight events: Remains in CSU. BG variable on current SQ insulin regimen (143-280). Patient reports eating  cereal overnight likely cause of BG excursions.   Eatin%  Nausea: No  Hypoglycemia and intervention: No  Fever: No  TPN and/or TF: No  If yes, type of TF/TPN and rate: n/a    BP (!) 82/0   Pulse 85   Temp 98.5 °F (36.9 °C) (Oral)   Resp 20   Ht 6' 3" (1.905 m)   Wt 67.6 kg (149 lb 0.5 oz)   SpO2 99%   BMI 18.63 kg/m²     Labs Reviewed and Include    Recent Labs   Lab 24  0508   *   CALCIUM 9.1   *   K 3.6   CO2 29   CL 94*   BUN 15 " "  CREATININE 1.2     Lab Results   Component Value Date    WBC 8.15 09/29/2024    WBC 8.15 09/29/2024    HGB 7.5 (L) 09/29/2024    HGB 7.5 (L) 09/29/2024    HCT 26.3 (L) 09/29/2024    HCT 26.3 (L) 09/29/2024    MCV 76 (L) 09/29/2024    MCV 76 (L) 09/29/2024     09/29/2024     09/29/2024     No results for input(s): "TSH", "FREET4" in the last 168 hours.  Lab Results   Component Value Date    HGBA1C 10.3 (H) 08/25/2024       Nutritional status:   Body mass index is 18.63 kg/m².  Lab Results   Component Value Date    ALBUMIN 2.5 (L) 09/27/2024    ALBUMIN 2.3 (L) 09/25/2024    ALBUMIN 2.5 (L) 09/23/2024     Lab Results   Component Value Date    PREALBUMIN 14 (L) 09/27/2024    PREALBUMIN 12 (L) 09/25/2024    PREALBUMIN 11 (L) 09/09/2024       Estimated Creatinine Clearance: 79 mL/min (based on SCr of 1.2 mg/dL).    Accu-Checks  Recent Labs     09/27/24  1055 09/27/24  1535 09/27/24  2108 09/28/24  0647 09/28/24  1054 09/28/24  1550 09/28/24  1620 09/28/24  1910 09/28/24  2108 09/29/24  0736   POCTGLUCOSE 363* 85 298* 162* 178* 143* 147* 250* 226* 280*       Current Medications and/or Treatments Impacting Glycemic Control  Immunotherapy:    Immunosuppressants       None          Steroids:   Hormones (From admission, onward)      None          Pressors:    Autonomic Drugs (From admission, onward)      None          Hyperglycemia/Diabetes Medications:   Antihyperglycemics (From admission, onward)      Start     Stop Route Frequency Ordered    09/27/24 0900  insulin glargine U-100 (Lantus) pen 18 Units         -- SubQ 2 times daily 09/27/24 0842    09/26/24 1645  insulin aspart U-100 pen 10 Units         -- SubQ 3 times daily with meals 09/26/24 1343    09/23/24 1409  insulin aspart U-100 pen 0-10 Units         -- SubQ Before meals & nightly PRN 09/23/24 1309            ASSESSMENT and PLAN    Cardiac/Vascular  LVAD (left ventricular assist device) present  Managed by primary team  Optimize BG " control      Endocrine  Type 2 diabetes mellitus with hyperglycemia  BG goal: 140-180  T2DM.  History of LVAD.      - Lantus 18 units BID   - Continue Novolog 10 units TID with meals   - Novolog /25  - POCT Glucose before meals and at bedtime  - Hypoglycemia protocol in place      ** Please notify Endocrine for any change and/or advance in diet**  ** Please call Endocrine for any BG related issues **     Discharge Planning:   TBD. Please notify endocrinology prior to discharge.      GI  * Hematochezia  Managed per primary team  Avoid hypoglycemia        Rectal bleeding    Managed by primary team  Optimize BG control        Jody Starkey, NP  Endocrinology  [facility]

## 2024-09-29 NOTE — PROGRESS NOTES
[facility]  Heart Transplant  Progress Note    Patient Name: Kevan Queen  MRN: [id number]  Admission Date: 9/23/2024  Hospital Length of Stay: 6 days  Attending Physician: Dalia Crum MD  Primary Care Provider: Rosio Armendariz FNP  Principal Problem:Hematochezia    Subjective:   Interval History:  No events overnight or new complaints this AM. Stable from bleeding standpoint w/ stable H/H with INR now therapeutic. Patient feels ready for d/c home today and is agreeable to discharge if transportation can be arranged. Will need to remain on Ancef thru 10/9.    Continuous Infusions:   0.9% NaCl   Intravenous Continuous         Scheduled Meds:   alteplase  2 mg Intra-Catheter Once    amiodarone  200 mg Oral Daily    amLODIPine  5 mg Oral Daily    atorvastatin  40 mg Oral Daily    ceFAZolin (ANCEF) 6 g in D5W 500 mL CONTINUOUS INFUSION  6 g Intravenous Q24H    DULoxetine  30 mg Oral Daily    eplerenone  25 mg Oral Daily    fluconazole  400 mg Oral Daily    furosemide (LASIX) injection  80 mg Intravenous TID    gabapentin  100 mg Oral Daily    And    gabapentin  400 mg Oral QHS    hydrocortisone  25 mg Rectal BID    insulin aspart U-100  10 Units Subcutaneous TIDWM    insulin glargine U-100  18 Units Subcutaneous BID    levETIRAcetam  1,500 mg Oral BID    magnesium oxide  400 mg Oral Daily    magnesium sulfate IVPB  2 g Intravenous Q2H    pantoprazole  40 mg Oral Daily    polyethylene glycol  17 g Oral Daily    potassium chloride  30 mEq Oral Daily    sodium chloride 0.9%  250 mL Intravenous Once    warfarin  1.5 mg Oral Daily     PRN Meds:  Current Facility-Administered Medications:     0.9%  NaCl infusion (for blood administration), , Intravenous, Q24H PRN    0.9%  NaCl infusion (for blood administration), , Intravenous, Q24H PRN    acetaminophen, 650 mg, Oral, Q6H PRN    cyclobenzaprine, 5 mg, Oral, Daily PRN    dextrose 10%, 12.5 g, Intravenous, PRN    dextrose 10%, 12.5 g,  Intravenous, PRN    dextrose 10%, 25 g, Intravenous, PRN    dextrose 10%, 25 g, Intravenous, PRN    glucagon (human recombinant), 1 mg, Intramuscular, PRN    glucagon (human recombinant), 1 mg, Intramuscular, PRN    glucose, 16 g, Oral, PRN    glucose, 24 g, Oral, PRN    HYDROmorphone, 0.2 mg, Intravenous, Q5 Min PRN    insulin aspart U-100, 0-10 Units, Subcutaneous, QID (AC + HS) PRN    ondansetron, 4 mg, Oral, Q8H PRN    ondansetron, 4 mg, Intravenous, Daily PRN    senna-docusate 8.6-50 mg, 1 tablet, Oral, Daily PRN    Review of patient's allergies indicates:   Allergen Reactions    Torsemide Hives     Objective:     Vital Signs (Most Recent):  Temp: 98.2 °F (36.8 °C) (09/29/24 1121)  Pulse: 84 (09/29/24 1244)  Resp: 18 (09/29/24 1121)  BP: (!) 84/0 (09/29/24 1111)  SpO2: 100 % (09/29/24 1121) Vital Signs (24h Range):  Temp:  [98 °F (36.7 °C)-98.6 °F (37 °C)] 98.2 °F (36.8 °C)  Pulse:  [68-91] 84  Resp:  [16-20] 18  SpO2:  [96 %-100 %] 100 %  BP: (80-86)/(0) 84/0     Patient Vitals for the past 72 hrs (Last 3 readings):   Weight   09/27/24 0915 67.6 kg (149 lb 0.5 oz)     Body mass index is 18.63 kg/m².      Intake/Output Summary (Last 24 hours) at 9/29/2024 1328  Last data filed at 9/29/2024 1206  Gross per 24 hour   Intake 1638 ml   Output 2525 ml   Net -887 ml       Hemodynamic Parameters:  CVP:  [15 mmHg] 15 mmHg           Physical Exam  Vitals reviewed.   HENT:      Head: Normocephalic.      Nose: Nose normal.   Eyes:      Conjunctiva/sclera: Conjunctivae normal.   Cardiovascular:      Rate and Rhythm: Normal rate and regular rhythm.      Comments: Smooth VAD hum   Pulmonary:      Effort: Pulmonary effort is normal.   Abdominal:      General: Abdomen is flat.      Tenderness: There is no abdominal tenderness.   Musculoskeletal:         General: Normal range of motion.      Cervical back: Normal range of motion.   Skin:     General: Skin is warm.      Capillary Refill: Capillary refill takes less than 2  seconds.   Neurological:      General: No focal deficit present.      Mental Status: He is alert.   Psychiatric:         Mood and Affect: Mood normal.         Behavior: Behavior normal.         Thought Content: Thought content normal.         Judgment: Judgment normal.            Significant Labs:  CBC:  Recent Labs   Lab 09/28/24  0517 09/28/24  1741 09/29/24  0508   WBC 8.33 11.12 8.15  8.15   RBC 3.40* 3.50* 3.45*  3.45*   HGB 7.4* 7.7* 7.5*  7.5*   HCT 25.1* 26.0* 26.3*  26.3*    278 249  249   MCV 74* 74* 76*  76*   MCH 21.8* 22.0* 21.7*  21.7*   MCHC 29.5* 29.6* 28.5*  28.5*     BNP:  Recent Labs   Lab 09/25/24  0359 09/27/24  0423   * 385*     CMP:  Recent Labs   Lab 09/23/24  0205 09/24/24  0312 09/25/24  0359 09/26/24  0409 09/27/24  0423 09/28/24  0517 09/29/24  0508   GLU  --    < > 139*   < > 182* 162* 316*   CALCIUM 8.5   < > 8.7   < > 9.2 9.0 9.1   ALBUMIN 2.5*  --  2.3*  --  2.5*  --   --    PROT  --   --  7.7  --  8.3  --   --    *   < > 136   < > 134* 133* 129*   K 3.3*   < > 3.3*   < > 3.7 3.7 3.6   CO2 28   < > 31*   < > 29 30* 29   CL 93*   < > 95   < > 97 96 94*   BUN 13.0   < > 10   < > 15 14 15   CREATININE 1.33*   < > 0.9   < > 1.1 0.9 1.2   ALKPHOS 264*  --  230*  --  269*  --   --    ALT 12  --  6*  --  5*  --   --    AST 40*  --  28  --  35  --   --    BILITOT 1.3  --  2.0*  --  1.3*  --   --     < > = values in this interval not displayed.      Coagulation:   Recent Labs   Lab 09/27/24  0423 09/28/24  0517 09/29/24  0508   INR 1.7* 1.8* 2.2*   APTT 32.8* 34.1* 33.9*     LDH:  Recent Labs   Lab 09/27/24  0423 09/28/24  0517 09/29/24  0508    235 235     Microbiology:  Microbiology Results (last 7 days)       ** No results found for the last 168 hours. **            I have reviewed all pertinent labs within the past 24 hours.    Estimated Creatinine Clearance: 79 mL/min (based on SCr of 1.2 mg/dL).    Diagnostic Results:  I have reviewed all pertinent  imaging results/findings within the past 24 hours.  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  He was weaned off  but he had to restarted due to RVF and transitioned to milrinone (secondary to  shortage). Most recently admitted on 9/10-9/12 for pyelonephritis with Bcx positive for yeast. Had many GOC discussions but he did not want to be DNR/DNI, and wants to continue aggressive care. He was weaned off milrinone but was discharged with PICC for IV antibiotics. Transferred after presenting to the ED last night after one episode of bright red blood per rectum yesterday evening. Pt states he has had rectal pain for a few days. His mother found an external hemorrhoid and was able to reduce with manual pressure in the rectum. H/H stable. INR 2.1.     Rectal bleeding  -Rectal bleeding x 1 PTA.  External hemorrhoid noted by mother that she then reduced   -Multiple bloody BM during admission   -H/H trened down requiring 2u pRBC  -GI consulted, s/p Colonoscopy 9/27 w/ clipping of bleeding rectal ulcer  - H/H since stablized and no evidence of bleeding    LVAD (left ventricular assist device) present  -HeartMate 3 Implanted 6/23/2022 with early RV failure requiring RVAD with ProTek Duo   -Continue Coumadin,  Goal INR 2.0-3.0 . Therapeutic today, No further signs of bleeding since C scope treated bleeding rectal ulcer on 9/25  -Antiplatelets ASA 81 mg, on hold with GIB  -LDH is stable overall today. Will continue to monitor daily.  -Speed set at 5100, LSL 4700 rpm  -Interrogation notable for no events  -Not listed for OHTx          Procedure: Device Interrogation Including  analysis of device parameters  Current Settings: Ventricular Assist Device  Review of device function is stable/unstable stable        9/29/2024    11:11 AM 9/29/2024     8:00 AM 9/29/2024     4:06 AM 9/28/2024    11:05 PM 9/28/2024     7:30 PM 9/28/2024     3:20 PM 9/28/2024    12:10 PM   TXP LVAD INTERROGATIONS   Type HeartMate3 HeartMate3 HeartMate3 HeartMate3 HeartMate3 HeartMate3 HeartMate3   Flow 4.2 4.2 4 3.9 4.1 3.8 4.2   Speed 5100 5150 5100 5100 5100 5150 5100   PI 5.2 5.1 5.9 6 5.5 6.3 5.4   Power (Serna) 3.5 3.6 3.5 3.6 3.6 3.5 3.6   LSL 4700 4700   4700 4700 4700   Pulsatility Intermittent pulse Intermittent pulse Intermittent pulse Intermittent pulse Intermittent pulse Intermittent pulse Intermittent pulse         Type 2 diabetes mellitus with hyperglycemia  -IDDM  -Endocrinology consulted         Jeff Spencer PA-C  Heart Transplant  [facility]

## 2024-09-29 NOTE — ASSESSMENT & PLAN NOTE
-HeartMate 3 Implanted 6/23/2022 with early RV failure requiring RVAD with ProTek Duo   -Continue Coumadin,  Goal INR 2.0-3.0 . Therapeutic today, No further signs of bleeding since C scope treated bleeding rectal ulcer on 9/25  -Antiplatelets ASA 81 mg, on hold with GIB  -LDH is stable overall today. Will continue to monitor daily.  -Speed set at 5100, LSL 4700 rpm  -Interrogation notable for no events  -Not listed for OHTx          Procedure: Device Interrogation Including analysis of device parameters  Current Settings: Ventricular Assist Device  Review of device function is stable/unstable stable        9/29/2024    11:11 AM 9/29/2024     8:00 AM 9/29/2024     4:06 AM 9/28/2024    11:05 PM 9/28/2024     7:30 PM 9/28/2024     3:20 PM 9/28/2024    12:10 PM   TXP LVAD INTERROGATIONS   Type HeartMate3 HeartMate3 HeartMate3 HeartMate3 HeartMate3 HeartMate3 HeartMate3   Flow 4.2 4.2 4 3.9 4.1 3.8 4.2   Speed 5100 5150 5100 5100 5100 5150 5100   PI 5.2 5.1 5.9 6 5.5 6.3 5.4   Power (Serna) 3.5 3.6 3.5 3.6 3.6 3.5 3.6   LSL 4700 4700   4700 4700 4700   Pulsatility Intermittent pulse Intermittent pulse Intermittent pulse Intermittent pulse Intermittent pulse Intermittent pulse Intermittent pulse

## 2024-09-29 NOTE — SUBJECTIVE & OBJECTIVE
Interval History:  No events overnight or new complaints this AM. Stable from bleeding standpoint w/ stable H/H with INR now therapeutic. Patient feels ready for d/c home today and is agreeable to discharge if transportation can be arranged. Will need to remain on Ancef thru 10/9.    Continuous Infusions:   0.9% NaCl   Intravenous Continuous         Scheduled Meds:   alteplase  2 mg Intra-Catheter Once    amiodarone  200 mg Oral Daily    amLODIPine  5 mg Oral Daily    atorvastatin  40 mg Oral Daily    ceFAZolin (ANCEF) 6 g in D5W 500 mL CONTINUOUS INFUSION  6 g Intravenous Q24H    DULoxetine  30 mg Oral Daily    eplerenone  25 mg Oral Daily    fluconazole  400 mg Oral Daily    furosemide (LASIX) injection  80 mg Intravenous TID    gabapentin  100 mg Oral Daily    And    gabapentin  400 mg Oral QHS    hydrocortisone  25 mg Rectal BID    insulin aspart U-100  10 Units Subcutaneous TIDWM    insulin glargine U-100  18 Units Subcutaneous BID    levETIRAcetam  1,500 mg Oral BID    magnesium oxide  400 mg Oral Daily    magnesium sulfate IVPB  2 g Intravenous Q2H    pantoprazole  40 mg Oral Daily    polyethylene glycol  17 g Oral Daily    potassium chloride  30 mEq Oral Daily    sodium chloride 0.9%  250 mL Intravenous Once    warfarin  1.5 mg Oral Daily     PRN Meds:  Current Facility-Administered Medications:     0.9%  NaCl infusion (for blood administration), , Intravenous, Q24H PRN    0.9%  NaCl infusion (for blood administration), , Intravenous, Q24H PRN    acetaminophen, 650 mg, Oral, Q6H PRN    cyclobenzaprine, 5 mg, Oral, Daily PRN    dextrose 10%, 12.5 g, Intravenous, PRN    dextrose 10%, 12.5 g, Intravenous, PRN    dextrose 10%, 25 g, Intravenous, PRN    dextrose 10%, 25 g, Intravenous, PRN    glucagon (human recombinant), 1 mg, Intramuscular, PRN    glucagon (human recombinant), 1 mg, Intramuscular, PRN    glucose, 16 g, Oral, PRN    glucose, 24 g, Oral, PRN    HYDROmorphone, 0.2 mg, Intravenous, Q5 Min PRN     insulin aspart U-100, 0-10 Units, Subcutaneous, QID (AC + HS) PRN    ondansetron, 4 mg, Oral, Q8H PRN    ondansetron, 4 mg, Intravenous, Daily PRN    senna-docusate 8.6-50 mg, 1 tablet, Oral, Daily PRN    Review of patient's allergies indicates:   Allergen Reactions    Torsemide Hives     Objective:     Vital Signs (Most Recent):  Temp: 98.2 °F (36.8 °C) (09/29/24 1121)  Pulse: 84 (09/29/24 1244)  Resp: 18 (09/29/24 1121)  BP: (!) 84/0 (09/29/24 1111)  SpO2: 100 % (09/29/24 1121) Vital Signs (24h Range):  Temp:  [98 °F (36.7 °C)-98.6 °F (37 °C)] 98.2 °F (36.8 °C)  Pulse:  [68-91] 84  Resp:  [16-20] 18  SpO2:  [96 %-100 %] 100 %  BP: (80-86)/(0) 84/0     Patient Vitals for the past 72 hrs (Last 3 readings):   Weight   09/27/24 0915 67.6 kg (149 lb 0.5 oz)     Body mass index is 18.63 kg/m².      Intake/Output Summary (Last 24 hours) at 9/29/2024 1328  Last data filed at 9/29/2024 1206  Gross per 24 hour   Intake 1638 ml   Output 2525 ml   Net -887 ml       Hemodynamic Parameters:  CVP:  [15 mmHg] 15 mmHg           Physical Exam  Vitals reviewed.   HENT:      Head: Normocephalic.      Nose: Nose normal.   Eyes:      Conjunctiva/sclera: Conjunctivae normal.   Cardiovascular:      Rate and Rhythm: Normal rate and regular rhythm.      Comments: Smooth VAD hum   Pulmonary:      Effort: Pulmonary effort is normal.   Abdominal:      General: Abdomen is flat.      Tenderness: There is no abdominal tenderness.   Musculoskeletal:         General: Normal range of motion.      Cervical back: Normal range of motion.   Skin:     General: Skin is warm.      Capillary Refill: Capillary refill takes less than 2 seconds.   Neurological:      General: No focal deficit present.      Mental Status: He is alert.   Psychiatric:         Mood and Affect: Mood normal.         Behavior: Behavior normal.         Thought Content: Thought content normal.         Judgment: Judgment normal.            Significant Labs:  CBC:  Recent Labs   Lab  09/28/24  0517 09/28/24  1741 09/29/24  0508   WBC 8.33 11.12 8.15  8.15   RBC 3.40* 3.50* 3.45*  3.45*   HGB 7.4* 7.7* 7.5*  7.5*   HCT 25.1* 26.0* 26.3*  26.3*    278 249  249   MCV 74* 74* 76*  76*   MCH 21.8* 22.0* 21.7*  21.7*   MCHC 29.5* 29.6* 28.5*  28.5*     BNP:  Recent Labs   Lab 09/25/24 0359 09/27/24  0423   * 385*     CMP:  Recent Labs   Lab 09/23/24  0205 09/24/24  0312 09/25/24 0359 09/26/24  0409 09/27/24  0423 09/28/24  0517 09/29/24  0508   GLU  --    < > 139*   < > 182* 162* 316*   CALCIUM 8.5   < > 8.7   < > 9.2 9.0 9.1   ALBUMIN 2.5*  --  2.3*  --  2.5*  --   --    PROT  --   --  7.7  --  8.3  --   --    *   < > 136   < > 134* 133* 129*   K 3.3*   < > 3.3*   < > 3.7 3.7 3.6   CO2 28   < > 31*   < > 29 30* 29   CL 93*   < > 95   < > 97 96 94*   BUN 13.0   < > 10   < > 15 14 15   CREATININE 1.33*   < > 0.9   < > 1.1 0.9 1.2   ALKPHOS 264*  --  230*  --  269*  --   --    ALT 12  --  6*  --  5*  --   --    AST 40*  --  28  --  35  --   --    BILITOT 1.3  --  2.0*  --  1.3*  --   --     < > = values in this interval not displayed.      Coagulation:   Recent Labs   Lab 09/27/24  0423 09/28/24  0517 09/29/24  0508   INR 1.7* 1.8* 2.2*   APTT 32.8* 34.1* 33.9*     LDH:  Recent Labs   Lab 09/27/24  0423 09/28/24  0517 09/29/24  0508    235 235     Microbiology:  Microbiology Results (last 7 days)       ** No results found for the last 168 hours. **            I have reviewed all pertinent labs within the past 24 hours.    Estimated Creatinine Clearance: 79 mL/min (based on SCr of 1.2 mg/dL).    Diagnostic Results:  I have reviewed all pertinent imaging results/findings within the past 24 hours.

## 2024-09-30 DIAGNOSIS — I50.84 CHF (NYHA CLASS IV, ACC/AHA STAGE D): Primary | ICD-10-CM

## 2024-09-30 RX ORDER — FUROSEMIDE 40 MG/1
80 TABLET ORAL 2 TIMES DAILY
Qty: 120 TABLET | Refills: 11 | Status: SHIPPED | OUTPATIENT
Start: 2024-09-30 | End: 2025-09-30

## 2024-09-30 NOTE — DISCHARGE SUMMARY
[facility] - Cardiology Stepdown  Heart Transplant  Discharge Summary      Patient Name: Kevan Queen  MRN: [id number]  Admission Date: 9/23/2024  Hospital Length of Stay: 6 days  Discharge Date and Time: 09/30/2024 11:47 AM  Attending Physician: No att. providers found   Discharging Provider: Jeff Spencer PA-C  Primary Care Provider: Rosio Armendariz, [place]     HPI:  39 year old male with stage D 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  He was weaned off  but he had to restarted due to RVF and transitioned to milrinone (secondary to  shortage). Most recently admitted on 9/10-9/12 for pyelonephritis with Bcx positive for yeast. Had many GOC discussions but he did not want to be DNR/DNI, and wants to continue aggressive care. He was weaned off milrinone but was discharged with PICC for IV antibiotics. Transferred after presenting to the ED last night after one episode of bright red blood per rectum yesterday evening. Pt states he has had rectal pain for a few days. His mother found an external hemorrhoid and was able to reduce with manual pressure in the rectum. H/H stable. INR 2.1.     Procedure(s) (LRB):  COLONOSCOPY (N/A)  EGD (ESOPHAGOGASTRODUODENOSCOPY) (N/A)     Hospital Course:  Mr Thacker was transferred after presenting to the ED one episode of bright red blood per rectum the evening before. Pt states he has had rectal pain for a few days. His mother found an external hemorrhoid and was able to reduce with manual pressure in the rectum. H/H stable. INR 2.1. He continued to have BRBPR and was given 2u pRBC and was taken for colonsocopy where he had a bleeding rectal ulcer treated and clipped with stabilization  of his H/H and no further bleeding. He was diuressed with IVP lasix with good effect and improvement of his volume status. At d/c was offered to be switched back to bumex 2 mg bid but he curretnly taking 80 lasix BID at home and thinks that works better for him so we will stick with that at d/c. Discharging with  so that he  can complete cefazolin 6g continuous infusion iv daily, maria isabel 10/9 for 6w course .    Goals of Care Treatment Preferences:  Code Status: Full Code    Health care agent: Niharika Wright  [facility] care agent number: [phone]          What is most important right now is to focus on symptom/pain control, extending life as long as possible, even it it means sacrificing quality, curative/life-prolongation (regardless of treatment burdens).  Accordingly, we have decided that the best plan to meet the patient's goals includes continuing with treatment.      Consults (From admission, onward)          Status Ordering Provider     Inpatient consult to Infectious Diseases  Once        Provider:  (Not yet assigned)    Completed MILAN REED     Inpatient consult to Gastroenterology  Once        Provider:  (Not yet assigned)    Completed DARCI HALE     Inpatient consult to Registered Dietitian/Nutritionist  Once        Provider:  (Not yet assigned)    Completed MILAN REED     Inpatient consult to Registered Dietitian/Nutritionist  Once        Provider:  (Not yet assigned)    Completed MILAN REED     Inpatient consult to Endocrinology  Once        Provider:  (Not yet assigned)    Completed MILAN REED     Inpatient consult to Palliative Care  Once        Provider:  (Not yet assigned)    Completed MILAN REED            Significant Diagnostic Studies: Labs: All labs within the past 24 hours have been reviewed    Pending Diagnostic Studies:       None          Final Active Diagnoses:    Diagnosis Date Noted POA    PRINCIPAL PROBLEM:  Hematochezia [K92.1] 09/24/2024 Yes     "Anticoagulated [Z79.01] 09/24/2024 Not Applicable    Rectal bleeding [K62.5] 09/23/2024 Yes    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  Medications:  Reconciled Home Medications:      Medication List        START taking these medications      bumetanide 1 MG tablet  Commonly known as: BUMEX  Take 2 tablets (2 mg total) by mouth 2 (two) times a day.     polyethylene glycol 17 gram Pwpk  Commonly known as: GLYCOLAX  Take 17 g by mouth once daily.     senna-docusate 8.6-50 mg 8.6-50 mg per tablet  Commonly known as: PERICOLACE  Take 1 tablet by mouth daily as needed for Constipation.            CONTINUE taking these medications      acetaminophen 325 MG tablet  Commonly known as: TYLENOL  Take 2 tablets (650 mg total) by mouth every 6 (six) hours as needed for Pain.     amiodarone 200 MG Tab  Commonly known as: PACERONE  Take 1 tablet (200 mg total) by mouth once daily.     amLODIPine 5 MG tablet  Commonly known as: NORVASC  Take 1 tablet (5 mg total) by mouth once daily.     aspirin 81 MG EC tablet  Commonly known as: ECOTRIN  Take 1 tablet (81 mg total) by mouth once daily.     atorvastatin 40 MG tablet  Commonly known as: LIPITOR  Take 1 tablet (40 mg total) by mouth once daily.     BAQSIMI 3 mg/actuation Spry  Generic drug: glucagon  1 each by Nasal route as needed (hypoglycemia).     BD ULTRA-FINE GARTH PEN NEEDLE 32 gauge x 5/32" Ndle  Generic drug: pen needle, diabetic  1 each by Misc.(Non-Drug; Combo Route) route 4 (four) times daily.     blood sugar diagnostic Strp  Use to test blood glucose 4 (four) times " daily.     blood-glucose meter Misc  Commonly known as: TRUE METRIX GLUCOSE METER  Use to test blood glucose 4 (four) times daily.     D5W PgBk 50 mL with ceFAZolin 2 gram SolR 6 g  Inject 6 g into the vein once daily.     DULoxetine 30 MG capsule  Commonly known as: CYMBALTA  Take 1 capsule (30 mg total) by mouth once daily.     eplerenone 25 MG Tab  Commonly known as: INSPRA  Take 1 tablet (25 mg total) by mouth once daily.     fluconazole 200 MG Tab  Commonly known as: DIFLUCAN  Take 2 tablets (400 mg total) by mouth once daily.     FREESTYLE LUCIUS 3 SENSOR Dee  Generic drug: blood-glucose sensor  1 Device by Misc.(Non-Drug; Combo Route) route every 14 (fourteen) days.     gabapentin 100 MG capsule  Commonly known as: NEURONTIN  Take 1 capsule (100 mg total) by mouth 2 (two) times daily AND 4 capsules (400 mg total) every evening.     insulin aspart U-100 100 unit/mL (3 mL) Inpn pen  Commonly known as: NovoLOG  Inject 18 Units into the skin 3 (three) times daily with meals: MAX 94 units/daily  150-200    201-250    251-300    301-350    >350  +2 units   +4 units    +6 units    +8 units    +10 units     insulin detemir U-100 (Levemir) 100 unit/mL (3 mL) Inpn pen  Inject 12 Units into the skin 2 (two) times daily. In the morning and before bed.     lancets 33 gauge Misc  Use to test blood glucose 4 (four) times daily.     levETIRAcetam 1000 MG tablet  Commonly known as: KEPPRA  Take 1.5 tablets (1,500 mg total) by mouth 2 (two) times daily.     magnesium oxide 400 mg (241.3 mg magnesium) tablet  Commonly known as: MAG-OX  Take 1 tablet (400 mg total) by mouth once daily.     ondansetron 4 MG Tbdl  Commonly known as: ZOFRAN-ODT  Take 1 tablet (4 mg total) by mouth every 8 (eight) hours as needed (nausea).     pantoprazole 40 MG tablet  Commonly known as: PROTONIX  Take 1 tablet (40 mg total) by mouth once daily.     potassium chloride SA 10 MEQ tablet  Commonly known as: K-DUR,KLOR-CON M  Take 3 tablets (30 mEq  total) by mouth once daily.     warfarin 3 MG tablet  Commonly known as: COUMADIN  Take 0.5 tablets (1.5 mg total) by mouth Daily.            STOP taking these medications      cyclobenzaprine 5 MG tablet  Commonly known as: FLEXERIL     furosemide 80 MG tablet  Commonly known as: LASIX              Jeff Spencer PA-C  Heart Transplant  [facility] - Cardiology Stepdown

## 2024-09-30 NOTE — HOSPITAL COURSE
Mr Thacker was transferred after presenting to the ED one episode of bright red blood per rectum the evening before. Pt states he has had rectal pain for a few days. His mother found an external hemorrhoid and was able to reduce with manual pressure in the rectum. H/H stable. INR 2.1. He continued to have BRBPR and was given 2u pRBC and was taken for colonsocopy where he had a bleeding rectal ulcer treated and clipped with stabilization of his H/H and no further bleeding. He was diuressed with IVP lasix with good effect and improvement of his volume status. At d/c was offered to be switched back to bumex 2 mg bid but he curretnly taking 80 lasix BID at home and thinks that works better for him so we will stick with that at d/c. Discharging with HH so that he  can complete cefazolin 6g continuous infusion iv daily, maria isabel 10/9 for 6w course .

## 2024-09-30 NOTE — DISCHARGE SUMMARY
[facility] - Cardiology Stepdown  Heart Transplant  Discharge Summary      Patient Name: Kevan Queen  MRN: [id number]  Admission Date: 9/23/2024  Hospital Length of Stay: 6 days  Discharge Date and Time: 09/30/2024 11:45 AM  Attending Physician: No att. providers found   Discharging Provider: Jeff Spencer PA-C  Primary Care Provider: Rosio Armendariz, [place]     HPI:  39 year old male with stage D 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  He was weaned off  but he had to restarted due to RVF and transitioned to milrinone (secondary to  shortage). Most recently admitted on 9/10-9/12 for pyelonephritis with Bcx positive for yeast. Had many GOC discussions but he did not want to be DNR/DNI, and wants to continue aggressive care. He was weaned off milrinone but was discharged with PICC for IV antibiotics. Transferred after presenting to the ED last night after one episode of bright red blood per rectum yesterday evening. Pt states he has had rectal pain for a few days. His mother found an external hemorrhoid and was able to reduce with manual pressure in the rectum. H/H stable. INR 2.1.     Procedure(s) (LRB):  COLONOSCOPY (N/A)  EGD (ESOPHAGOGASTRODUODENOSCOPY) (N/A)     Hospital Course:  Mr Thacker was transferred after presenting to the ED one episode of bright red blood per rectum the evening before. Pt states he has had rectal pain for a few days. His mother found an external hemorrhoid and was able to reduce with manual pressure in the rectum. H/H stable. INR 2.1. He continued to have BRBPR and was given 2u pRBC and was taken for colonsocopy where he had a bleeding rectal ulcer treated and clipped with stabilization  of his H/H and no further bleeding. He was diuressed with IVP lasix with good effect and improvement of his volume status. At d/c was offered to be switched back to bumex 2 mg bid but he curretnly taking 80 lasix BID at home and thinks that works better for him so we will stick with that at d/c. Discharging with  so that he  can complete cefazolin 6g continuous infusion iv daily, maria isabel 10/9 for 6w course .    Goals of Care Treatment Preferences:  Code Status: Full Code    Health care agent: Niharika Wright  [facility] care agent number: [phone]          What is most important right now is to focus on symptom/pain control, extending life as long as possible, even it it means sacrificing quality, curative/life-prolongation (regardless of treatment burdens).  Accordingly, we have decided that the best plan to meet the patient's goals includes continuing with treatment.      Consults (From admission, onward)          Status Ordering Provider     Inpatient consult to Infectious Diseases  Once        Provider:  (Not yet assigned)    Completed MILAN REED     Inpatient consult to Gastroenterology  Once        Provider:  (Not yet assigned)    Completed DARCI HALE     Inpatient consult to Registered Dietitian/Nutritionist  Once        Provider:  (Not yet assigned)    Completed MILAN REED     Inpatient consult to Registered Dietitian/Nutritionist  Once        Provider:  (Not yet assigned)    Completed MILAN REED     Inpatient consult to Endocrinology  Once        Provider:  (Not yet assigned)    Completed MILAN REED     Inpatient consult to Palliative Care  Once        Provider:  (Not yet assigned)    Completed MILAN REED            Significant Diagnostic Studies: Labs: All labs within the past 24 hours have been reviewed    Pending Diagnostic Studies:       None          Final Active Diagnoses:    Diagnosis Date Noted POA    PRINCIPAL PROBLEM:  Hematochezia [K92.1] 09/24/2024 Yes     "Anticoagulated [Z79.01] 09/24/2024 Not Applicable    Rectal bleeding [K62.5] 09/23/2024 Yes    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  Medications:  Reconciled Home Medications:      Medication List        START taking these medications      bumetanide 1 MG tablet  Commonly known as: BUMEX  Take 2 tablets (2 mg total) by mouth 2 (two) times a day.     polyethylene glycol 17 gram Pwpk  Commonly known as: GLYCOLAX  Take 17 g by mouth once daily.     senna-docusate 8.6-50 mg 8.6-50 mg per tablet  Commonly known as: PERICOLACE  Take 1 tablet by mouth daily as needed for Constipation.            CONTINUE taking these medications      acetaminophen 325 MG tablet  Commonly known as: TYLENOL  Take 2 tablets (650 mg total) by mouth every 6 (six) hours as needed for Pain.     amiodarone 200 MG Tab  Commonly known as: PACERONE  Take 1 tablet (200 mg total) by mouth once daily.     amLODIPine 5 MG tablet  Commonly known as: NORVASC  Take 1 tablet (5 mg total) by mouth once daily.     aspirin 81 MG EC tablet  Commonly known as: ECOTRIN  Take 1 tablet (81 mg total) by mouth once daily.     atorvastatin 40 MG tablet  Commonly known as: LIPITOR  Take 1 tablet (40 mg total) by mouth once daily.     BAQSIMI 3 mg/actuation Spry  Generic drug: glucagon  1 each by Nasal route as needed (hypoglycemia).     BD ULTRA-FINE GARTH PEN NEEDLE 32 gauge x 5/32" Ndle  Generic drug: pen needle, diabetic  1 each by Misc.(Non-Drug; Combo Route) route 4 (four) times daily.     blood sugar diagnostic Strp  Use to test blood glucose 4 (four) times " daily.     blood-glucose meter Misc  Commonly known as: TRUE METRIX GLUCOSE METER  Use to test blood glucose 4 (four) times daily.     D5W PgBk 50 mL with ceFAZolin 2 gram SolR 6 g  Inject 6 g into the vein once daily.     DULoxetine 30 MG capsule  Commonly known as: CYMBALTA  Take 1 capsule (30 mg total) by mouth once daily.     eplerenone 25 MG Tab  Commonly known as: INSPRA  Take 1 tablet (25 mg total) by mouth once daily.     fluconazole 200 MG Tab  Commonly known as: DIFLUCAN  Take 2 tablets (400 mg total) by mouth once daily.     FREESTYLE LUCIUS 3 SENSOR Dee  Generic drug: blood-glucose sensor  1 Device by Misc.(Non-Drug; Combo Route) route every 14 (fourteen) days.     gabapentin 100 MG capsule  Commonly known as: NEURONTIN  Take 1 capsule (100 mg total) by mouth 2 (two) times daily AND 4 capsules (400 mg total) every evening.     insulin aspart U-100 100 unit/mL (3 mL) Inpn pen  Commonly known as: NovoLOG  Inject 18 Units into the skin 3 (three) times daily with meals: MAX 94 units/daily  150-200    201-250    251-300    301-350    >350  +2 units   +4 units    +6 units    +8 units    +10 units     insulin detemir U-100 (Levemir) 100 unit/mL (3 mL) Inpn pen  Inject 12 Units into the skin 2 (two) times daily. In the morning and before bed.     lancets 33 gauge Misc  Use to test blood glucose 4 (four) times daily.     levETIRAcetam 1000 MG tablet  Commonly known as: KEPPRA  Take 1.5 tablets (1,500 mg total) by mouth 2 (two) times daily.     magnesium oxide 400 mg (241.3 mg magnesium) tablet  Commonly known as: MAG-OX  Take 1 tablet (400 mg total) by mouth once daily.     ondansetron 4 MG Tbdl  Commonly known as: ZOFRAN-ODT  Take 1 tablet (4 mg total) by mouth every 8 (eight) hours as needed (nausea).     pantoprazole 40 MG tablet  Commonly known as: PROTONIX  Take 1 tablet (40 mg total) by mouth once daily.     potassium chloride SA 10 MEQ tablet  Commonly known as: K-DUR,KLOR-CON M  Take 3 tablets (30 mEq  total) by mouth once daily.     warfarin 3 MG tablet  Commonly known as: COUMADIN  Take 0.5 tablets (1.5 mg total) by mouth Daily.            STOP taking these medications      cyclobenzaprine 5 MG tablet  Commonly known as: FLEXERIL     furosemide 80 MG tablet  Commonly known as: LASIX              Jeff Spencer PA-C  Heart Transplant  [facility] - Cardiology Stepdown

## 2024-10-01 NOTE — ASSESSMENT & PLAN NOTE
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 Repeated 7/29 with NGTD. Repeated again 7/30 NGTD  -PICC line replaced 8/1  -ECHO done 7/31 and no vegetations appreciated   -CT 8/1 with loculated fluid along Left lateral hear border, thoracic surgery consulted, not a candidate for VATS. Chest tube placed with IR 8/3, pleural fluid cultures positive for GPC  -Continue Ancef 8g IV q 24 hours for total of 6 weeks with end date 9/14 (home health orders completed).    oriented to person, place, time and situation

## 2024-10-03 ENCOUNTER — ANTI-COAG VISIT (OUTPATIENT)
Dept: CARDIOLOGY | Facility: CLINIC | Age: 39
End: 2024-10-03
Payer: MEDICAID

## 2024-10-03 ENCOUNTER — TELEPHONE (OUTPATIENT)
Dept: TRANSPLANT | Facility: CLINIC | Age: 39
End: 2024-10-03
Payer: MEDICAID

## 2024-10-03 ENCOUNTER — LAB REQUISITION (OUTPATIENT)
Dept: LAB | Facility: HOSPITAL | Age: 39
End: 2024-10-03
Payer: MEDICAID

## 2024-10-03 ENCOUNTER — PATIENT MESSAGE (OUTPATIENT)
Dept: INFECTIOUS DISEASES | Facility: CLINIC | Age: 39
End: 2024-10-03
Payer: MEDICAID

## 2024-10-03 DIAGNOSIS — Z95.811 LVAD (LEFT VENTRICULAR ASSIST DEVICE) PRESENT: Primary | ICD-10-CM

## 2024-10-03 DIAGNOSIS — I11.0 HYPERTENSIVE HEART DISEASE WITH HEART FAILURE: ICD-10-CM

## 2024-10-03 DIAGNOSIS — I50.23 ACUTE ON CHRONIC SYSTOLIC (CONGESTIVE) HEART FAILURE: ICD-10-CM

## 2024-10-03 LAB
ALBUMIN SERPL-MCNC: 2.7 G/DL (ref 3.5–5)
ALBUMIN/GLOB SERPL: 0.4 RATIO (ref 1.1–2)
ALP SERPL-CCNC: 277 UNIT/L (ref 40–150)
ALT SERPL-CCNC: 7 UNIT/L (ref 0–55)
ANION GAP SERPL CALC-SCNC: 9 MEQ/L
ANISOCYTOSIS BLD QL SMEAR: ABNORMAL
AST SERPL-CCNC: 29 UNIT/L (ref 5–34)
BASOPHILS # BLD AUTO: 0.03 X10(3)/MCL
BASOPHILS NFR BLD AUTO: 0.4 %
BILIRUB SERPL-MCNC: 1.2 MG/DL
BUN SERPL-MCNC: 20 MG/DL (ref 8.9–20.6)
CALCIUM SERPL-MCNC: 9.2 MG/DL (ref 8.4–10.2)
CHLORIDE SERPL-SCNC: 96 MMOL/L (ref 98–107)
CO2 SERPL-SCNC: 28 MMOL/L (ref 22–29)
CREAT SERPL-MCNC: 1.14 MG/DL (ref 0.73–1.18)
CREAT/UREA NIT SERPL: 18
CRP SERPL-MCNC: 18.9 MG/L
EOSINOPHIL # BLD AUTO: 0.04 X10(3)/MCL (ref 0–0.9)
EOSINOPHIL NFR BLD AUTO: 0.5 %
ERYTHROCYTE [DISTWIDTH] IN BLOOD BY AUTOMATED COUNT: 26.9 % (ref 11.5–17)
GFR SERPLBLD CREATININE-BSD FMLA CKD-EPI: >60 ML/MIN/1.73/M2
GLOBULIN SER-MCNC: 6.1 GM/DL (ref 2.4–3.5)
GLUCOSE SERPL-MCNC: 185 MG/DL (ref 74–100)
HCT VFR BLD AUTO: 25.3 % (ref 42–52)
HGB BLD-MCNC: 7.5 G/DL (ref 14–18)
HYPOCHROMIA BLD QL SMEAR: ABNORMAL
IMM GRANULOCYTES # BLD AUTO: 0.02 X10(3)/MCL (ref 0–0.04)
IMM GRANULOCYTES NFR BLD AUTO: 0.2 %
INR PPP: 2.2 (ref 2–3)
LYMPHOCYTES # BLD AUTO: 1.1 X10(3)/MCL (ref 0.6–4.6)
LYMPHOCYTES NFR BLD AUTO: 13.3 %
MCH RBC QN AUTO: 21.8 PG (ref 27–31)
MCHC RBC AUTO-ENTMCNC: 29.6 G/DL (ref 33–36)
MCV RBC AUTO: 73.5 FL (ref 80–94)
MICROCYTES BLD QL SMEAR: ABNORMAL
MONOCYTES # BLD AUTO: 0.79 X10(3)/MCL (ref 0.1–1.3)
MONOCYTES NFR BLD AUTO: 9.6 %
NEUTROPHILS # BLD AUTO: 6.28 X10(3)/MCL (ref 2.1–9.2)
NEUTROPHILS NFR BLD AUTO: 76 %
NRBC BLD AUTO-RTO: 0.5 %
OVALOCYTES (OLG): SLIGHT
PLATELET # BLD AUTO: 354 X10(3)/MCL (ref 130–400)
PLATELET # BLD EST: ADEQUATE 10*3/UL
PMV BLD AUTO: 9.6 FL (ref 7.4–10.4)
POIKILOCYTOSIS BLD QL SMEAR: ABNORMAL
POTASSIUM SERPL-SCNC: 4.5 MMOL/L (ref 3.5–5.1)
PROT SERPL-MCNC: 8.8 GM/DL (ref 6.4–8.3)
PROTHROMBIN TIME: 24.5 SECONDS (ref 11.7–14.5)
RBC # BLD AUTO: 3.44 X10(6)/MCL (ref 4.7–6.1)
SODIUM SERPL-SCNC: 133 MMOL/L (ref 136–145)
WBC # BLD AUTO: 8.26 X10(3)/MCL (ref 4.5–11.5)

## 2024-10-03 PROCEDURE — 80053 COMPREHEN METABOLIC PANEL: CPT

## 2024-10-03 PROCEDURE — 86140 C-REACTIVE PROTEIN: CPT

## 2024-10-03 PROCEDURE — 93793 ANTICOAG MGMT PT WARFARIN: CPT | Mod: ,,,

## 2024-10-03 PROCEDURE — 85025 COMPLETE CBC W/AUTO DIFF WBC: CPT

## 2024-10-03 PROCEDURE — 85610 PROTHROMBIN TIME: CPT

## 2024-10-03 NOTE — TELEPHONE ENCOUNTER
----- Message from RIAN Alamo sent at 9/17/2024  2:39 PM CDT -----  Regarding: CBC/CMP/CRP- Weekly IV ABX Labs   [facility] P:[phone] fax [phone] ;ANGELICA SALINAS- [place] [facility] (may/may not received results same day) // [facility] (Ph) [phone] (Zx) [phone] // Every monday labs  [facility] ph [phone] f- [phone]

## 2024-10-03 NOTE — TELEPHONE ENCOUNTER
Called in attempts to obtain labs, patient is being seen today for labs, confirmed that  still has correct orders which they do. Will attempt to get results tomorrow.

## 2024-10-04 ENCOUNTER — OFFICE VISIT (OUTPATIENT)
Dept: INFECTIOUS DISEASES | Facility: CLINIC | Age: 39
End: 2024-10-04
Payer: MEDICAID

## 2024-10-04 ENCOUNTER — TELEPHONE (OUTPATIENT)
Dept: INFECTIOUS DISEASES | Facility: CLINIC | Age: 39
End: 2024-10-04

## 2024-10-04 DIAGNOSIS — B49 FUNGEMIA: ICD-10-CM

## 2024-10-04 DIAGNOSIS — R78.81 BACTEREMIA DUE TO STAPHYLOCOCCUS: ICD-10-CM

## 2024-10-04 DIAGNOSIS — T82.7XXA INFECTION ASSOCIATED WITH DRIVELINE OF LEFT VENTRICULAR ASSIST DEVICE (LVAD): Primary | ICD-10-CM

## 2024-10-04 DIAGNOSIS — Z79.2 RECEIVING INTRAVENOUS ANTIBIOTIC TREATMENT AS OUTPATIENT: ICD-10-CM

## 2024-10-04 DIAGNOSIS — B95.8 BACTEREMIA DUE TO STAPHYLOCOCCUS: ICD-10-CM

## 2024-10-04 RX ORDER — CEFADROXIL 500 MG/1
500 CAPSULE ORAL EVERY 12 HOURS
Qty: 60 CAPSULE | Refills: 11 | Status: SHIPPED | OUTPATIENT
Start: 2024-10-10 | End: 2025-10-10

## 2024-10-04 NOTE — TELEPHONE ENCOUNTER
Called [facility], spoke to Pal  Gave verbal orders to stop antibiotics and remove line after patient completes his therapy on 10/9.

## 2024-10-04 NOTE — PROGRESS NOTES
The patient location is: [place]  The chief complaint leading to consultation is: f/u    Visit type: audiovisual attempted, transitioned to audio only due to technical difficulties      32 minutes of total time spent on the encounter, which includes face to face time and non-face to face time preparing to see the patient (eg, review of tests), Obtaining and/or reviewing separately obtained history, Documenting clinical information in the electronic or other health record, Independently interpreting results (not separately reported) and communicating results to the patient/family/caregiver, or Care coordination (not separately reported).         Each patient to whom he or she provides medical services by telemedicine is:  (1) informed of the relationship between the physician and patient and the respective role of any other health care provider with respect to management of the patient; and (2) notified that he or she may decline to receive medical services by telemedicine and may withdraw from such care at any time.    Notes:           INFECTIOUS DISEASE CLINIC  10/04/2024     Subjective:      Chief Complaint:   Chief Complaint   Patient presents with    Follow-up       History of Present Illness:    This is a 39 y.o. male with LVAD c/b prior MSSA DLESI with recent admission for COVID, DLESI and MSSA bacteremia c/b L empyema (maintained on ancef, maria isabel 9/14) who is referred to my clinic for follow up.  Patient known to ID, please see prior notes for full details. Doing well since discharge, reports tolerating ancef without issues. PICC line inadvertently was pulled a couple of weeks ago and went to ER to get it replaced with CXR done at that time with interval improvement in L sided opacity.  Denies fevers, chills, drainage from DLES or diarrhea. Reports breathing is better, still with minimal L sided pleuritic chest pain.     Interval history:  9/15/23: Pt states he is doing well. Tolerating ancef without issues, on last  bulb. No fevers, chills, dlesi drainage, reports minimal L pleuritic CP. Denies issues with PICC. Per chart review, pt was recently admitted for seizure like activity. Denies further seizures, HA or vision changes. Late to our appt - spoke with mother who got in touch with pt to get on virtual.   11/29/23: Doing well since discharged - was sent home on 2w course of dapto - completed without issues. Has PICC in for milrinone, denies problems. No fevers or chills. Reports some bloody drainage from prior ICD site. No drainage over DLES.  1/31/24: Had a seizure episode earlier this month, may have pulled DL a little. Denied issues with cefadroxil, tolerating without issues and denies missed doses. Per partner, pt had an episode a couple weeks ago of drainage from DL - went to [facility] ED, drainage cleared up prior to being seen. No fevers or chills. Denies further issues or drainage from DL/prior ICD site.   10/4/24: Staying at his mom's - doing well since discharge. Tolerating iv abx/fluconazole and denies issues with PICC. No fevers or chills, denies worsening drainage from DLES. No fevers or chills. Pt reports feeling better.     Review of Systems   Constitutional: Negative for chills and fever.   All other systems reviewed and are negative.        Past Medical History:   Diagnosis Date    Acute respiratory failure with hypoxia 07/22/2023    Arthritis     Awareness alteration, transient 09/01/2022    Cardiomyopathy     CHF (congestive heart failure) 10/01/2020    COVID-19 06/03/2023    Diabetes mellitus     Dilated cardiomyopathy 10/26/2020    Drug abuse 10/2020    Headache 04/19/2023    Hyperglycemia 12/16/2022    Hyperosmolar hyperglycemic state (HHS) 05/25/2022    ICD (implantable cardioverter-defibrillator) in place 10/26/2020    Infected defibrillator now s/p removal Nov 2023 07/23/2023    Left ventricular assist device (LVAD) complication, initial encounter 06/05/2023    Muscle cramping 06/15/2022    Renal disorder      SOB (shortness of breath) 06/13/2022    Tingling in extremities 07/13/2022     Past Surgical History:   Procedure Laterality Date    APPLICATION OF WOUND VACUUM-ASSISTED CLOSURE DEVICE N/A 6/30/2022    Procedure: APPLICATION, WOUND VAC;  Surgeon: Luis F Paige MD;  Location: [facility] OR [facility] FLR;  Service: Cardiovascular;  Laterality: N/A;  50 x 5 cm    CARDIAC DEFIBRILLATOR PLACEMENT      COLONOSCOPY N/A 9/25/2024    Procedure: COLONOSCOPY;  Surgeon: Drake Barton MD;  Location: [facility] ENDO (2ND FLR);  Service: Endoscopy;  Laterality: N/A;    ECHOCARDIOGRAM,TRANSESOPHAGEAL  11/9/2023    Procedure: Transesophageal echo (GUILLERMO) intra-procedure log documentation;  Surgeon: Eron Ivy MD;  Location: [facility] EP LAB;  Service: Cardiology;;    ESOPHAGOGASTRODUODENOSCOPY N/A 9/25/2024    Procedure: EGD (ESOPHAGOGASTRODUODENOSCOPY);  Surgeon: Drake Barton MD;  Location: [facility] ENDO (2ND FLR);  Service: Endoscopy;  Laterality: N/A;    EXTRACTION, ELECTRODE LEAD Left 11/9/2023    Procedure: EXTRACTION, ELECTRODE LEAD;  Surgeon: ADALID Leon MD;  Location: [facility] EP LAB;  Service: Cardiology;  Laterality: Left;  INFECTION, LEAD EXTRACTION, GUILLERMO, BSCI, ANES, EH,   *NO CTS BACKUP*    IMPLANTATION OF RIGHT VENTRICULAR ASSIST DEVICE (RVAD) N/A 6/29/2022    Procedure: INSERTION, RVAD;  Surgeon: Luis F Paige MD;  [facility];  Service: Cardiovascular;  Laterality: N/A;    INSERTION OF GRAFT TO PERICARDIUM Right 6/30/2022    Procedure: INSERTION-RIGHT VENTRICULAR ASSIST DEVICE;  Surgeon: Luis F Paige MD;  [facility];  Service: Cardiovascular;  Laterality: Right;    IRRIGATION OF MEDIASTINUM  6/30/2022    Procedure: IRRIGATION, MEDIASTINUM;  Surgeon: Luis F Paige MD;  [facility];  Service: Cardiovascular;;    LEFT VENTRICULAR ASSIST DEVICE Left 6/23/2022    Procedure: INSERTION-LEFT VENTRICULAR ASSIST DEVICE;  Surgeon: Luis F Paige MD;  Location: [facility] OR [address] [place] [address];   Service: Cardiovascular;  Laterality: Left;    LEFT VENTRICULAR ASSIST DEVICE N/A 6/29/2022    Procedure: INSERTION-LEFT VENTRICULAR ASSIST DEVICE;  Surgeon: Luis F Paige MD;  [facility];  Service: Cardiovascular;  Laterality: N/A;    REVISION OF SKIN POCKET FOR CARDIOVERTER-DEFIBRILLATOR  11/9/2023    Procedure: REVISION, SKIN POCKET, FOR CARDIOVERTER-DEFIBRILLATOR;  Surgeon: ADALID Leon MD;  Location: [facility] EP LAB;  Service: Cardiology;;    RIGHT HEART CATHETERIZATION Right 4/8/2022    Procedure: INSERTION, CATHETER, RIGHT HEART;  Surgeon: Luca Lopez Jr., MD;  Location: [facility] CATH LAB;  Service: Cardiology;  Laterality: Right;    RIGHT HEART CATHETERIZATION Right 4/19/2022    Procedure: INSERTION, CATHETER, RIGHT HEART;  Surgeon: Josh Pulido MD;  Location: [facility] CATH LAB;  Service: Cardiology;  Laterality: Right;    RIGHT HEART CATHETERIZATION Right 7/21/2022    Procedure: INSERTION, CATHETER, RIGHT HEART;  Surgeon: Dalia Crum MD;  Location: [facility] CATH LAB;  Service: Cardiology;  Laterality: Right;    RIGHT HEART CATHETERIZATION Right 10/31/2022    Procedure: INSERTION, CATHETER, RIGHT HEART;  Surgeon: Dalia Crum MD;  Location: [facility] CATH LAB;  Service: Cardiology;  Laterality: Right;    STERNAL WOUND CLOSURE N/A 6/30/2022    Procedure: CLOSURE, WOUND, STERNUM;  Surgeon: Luis F Paige MD;  [facility];  Service: Cardiovascular;  Laterality: N/A;     Family History   Problem Relation Name Age of Onset    Heart failure Father      Diverticulosis Brother      Heart attack Maternal Grandmother      Heart attack Maternal Grandfather       Social History     Tobacco Use    Smoking status: Former     Current packs/day: 0.00     Average packs/day: 0.5 packs/day for 16.6 years (8.3 ttl pk-yrs)     Types: Cigarettes     Start date: 10/1/2004     Quit date: 4/23/2021     Years since quitting: 3.4    Smokeless tobacco: Never   Substance Use Topics    Alcohol  use: Not Currently    Drug use: Not Currently     Types: Marijuana, MDMA (Ecstacy)       Review of patient's allergies indicates:   Allergen Reactions    Torsemide Hives         Objective:   VS (24h): There were no vitals filed for this visit.      Physical Exam      Micro:     9/4/24: blcx ngtd  8/28/24, 9/1/24: Blcx cparapsilosis (flu PARISA 1, jan 0.5, vori 0.03)  8/27/24: blcx Cparap, MSSA  8/27/24 DLES MSSA    2023 11/9 OR cx - MSSA, MRSE (superficial/deep pocket); cx from lead tip ngtd  11/8 blcx ngtd  8/5 blcx ngtd  8/3 pleural cx ngtd  7/28 blcx MSSA          Immunization History   Administered Date(s) Administered    COVID-19, MRNA, LN-S, PF (Pfizer) (Purple Cap) 12/08/2021, 12/29/2021    DTP 1985, 1985, 1985, 06/01/1987    HIB 02/05/1990    Influenza - Quadrivalent - PF *Preferred* (6 months and older) 10/16/2020, 09/24/2021, 12/16/2022    MMR 06/01/1987    OPV 1985, 1985, 06/01/1987         Assessment:     1. 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  Dispense: 60 capsule; Refill: 11  - IR Tunneled Cath Removal w/o Port; Future  - Ambulatory referral/consult to Interventional RAD; Future    2. Bacteremia due to Staphylococcus  - cefadroxil (DURICEF) 500 MG Cap; Take 1 capsule (500 mg total) by mouth every 12 (twelve) hours.  Dispense: 60 capsule; Refill: 11  - IR Tunneled Cath Removal w/o Port; Future  - Ambulatory referral/consult to Interventional RAD; Future    3. Receiving intravenous antibiotic treatment as outpatient  - IR Tunneled Cath Removal w/o Port; Future  - Ambulatory referral/consult to Interventional RAD; Future               39 y.o. 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 Since last seen, pt had an episode of hematochezia s/p EGD/Cscope. He is maintained on 6w course of both ancef and fluconazole - tolerating both  without issues.     Plan:     -continue cefazolin 6g iv continuous infusion daily, maria isabel 10/9  -continue fluconazole 400 mg daily, maria isabel 10/16   -once pt completes, stop antifungal and monitor off for now   -surveillance blood cx in 3 months  -once pt completes IV cefazolin, will arrange for TD PICC to be removed   -advised pt to start suppressive cefadroxil 500 mg bid given prior bacteremia/multiple DLESI - order sent to pt's pharmacy            Follow up in 3m, virtual visit preferred per pt    Patient was given ample time for questions, all questions answered. Strict return precautions given to patient. Visit today addressed a complex issue that requires longitudinal care and follow up. ;        Laura Baldwin MD  Infectious Disease

## 2024-10-07 ENCOUNTER — ANTI-COAG VISIT (OUTPATIENT)
Dept: CARDIOLOGY | Facility: CLINIC | Age: 39
End: 2024-10-07
Payer: MEDICAID

## 2024-10-07 ENCOUNTER — TELEPHONE (OUTPATIENT)
Dept: INTERVENTIONAL RADIOLOGY/VASCULAR | Facility: CLINIC | Age: 39
End: 2024-10-07
Payer: MEDICAID

## 2024-10-07 DIAGNOSIS — Z95.811 LVAD (LEFT VENTRICULAR ASSIST DEVICE) PRESENT: Primary | ICD-10-CM

## 2024-10-07 LAB — INR PPP: 2.3

## 2024-10-07 PROCEDURE — 93793 ANTICOAG MGMT PT WARFARIN: CPT | Mod: ,,,

## 2024-10-07 NOTE — PROGRESS NOTES
[facility]    10/07/2024    Kevan Queen (39 y.o.) is followed by the [facility].      Assessment/Plan:    Kevan Queen presents with a therapeutic INR. Goal INR: 2.0-3.0    Lab Results   Component Value Date    INR 2.3 10/07/2024    INR 2.2 10/03/2024    INR 2.2 (H) 09/29/2024       Assessment of patient findings per MA/LPN and chart review:   The following significant findings were found:   none    Recommendation for patient's warfarin regimen:   No change was made to warfarin therapy during this visit and patient has been instructed to continue their current warfarin regimen.    Recommended repeat INR in 2 days      Nela Morgan, PharmD, BCPS  Clinical Pharmacist - [facility]  Preferred Contact: Secure Messaging or In Basket Message

## 2024-10-08 ENCOUNTER — LAB REQUISITION (OUTPATIENT)
Dept: LAB | Facility: HOSPITAL | Age: 39
End: 2024-10-08
Payer: MEDICAID

## 2024-10-08 DIAGNOSIS — I11.0 HYPERTENSIVE HEART DISEASE WITH HEART FAILURE: ICD-10-CM

## 2024-10-08 LAB
ACANTHOCYTES (OLG): SLIGHT
ALBUMIN SERPL-MCNC: 2.4 G/DL (ref 3.5–5)
ALBUMIN/GLOB SERPL: 0.4 RATIO (ref 1.1–2)
ALP SERPL-CCNC: 278 UNIT/L (ref 40–150)
ALT SERPL-CCNC: 8 UNIT/L (ref 0–55)
ANION GAP SERPL CALC-SCNC: 9 MEQ/L
ANISOCYTOSIS BLD QL SMEAR: ABNORMAL
AST SERPL-CCNC: 31 UNIT/L (ref 5–34)
BASOPHILS # BLD AUTO: 0.02 X10(3)/MCL
BASOPHILS NFR BLD AUTO: 0.2 %
BILIRUB SERPL-MCNC: 1.3 MG/DL
BUN SERPL-MCNC: 12 MG/DL (ref 8.9–20.6)
CALCIUM SERPL-MCNC: 8.4 MG/DL (ref 8.4–10.2)
CHLORIDE SERPL-SCNC: 99 MMOL/L (ref 98–107)
CO2 SERPL-SCNC: 27 MMOL/L (ref 22–29)
CREAT SERPL-MCNC: 1.02 MG/DL (ref 0.73–1.18)
CREAT/UREA NIT SERPL: 12
CRP SERPL-MCNC: 21.5 MG/L
EOSINOPHIL # BLD AUTO: 0.12 X10(3)/MCL (ref 0–0.9)
EOSINOPHIL NFR BLD AUTO: 1.4 %
ERYTHROCYTE [DISTWIDTH] IN BLOOD BY AUTOMATED COUNT: 26.5 % (ref 11.5–17)
GFR SERPLBLD CREATININE-BSD FMLA CKD-EPI: >60 ML/MIN/1.73/M2
GLOBULIN SER-MCNC: 5.4 GM/DL (ref 2.4–3.5)
GLUCOSE SERPL-MCNC: 291 MG/DL (ref 74–100)
HCT VFR BLD AUTO: 24.6 % (ref 42–52)
HGB BLD-MCNC: 7.3 G/DL (ref 14–18)
HYPOCHROMIA BLD QL SMEAR: ABNORMAL
IMM GRANULOCYTES # BLD AUTO: 0.02 X10(3)/MCL (ref 0–0.04)
IMM GRANULOCYTES NFR BLD AUTO: 0.2 %
INR PPP: 2.4 (ref 2–3)
LYMPHOCYTES # BLD AUTO: 1.01 X10(3)/MCL (ref 0.6–4.6)
LYMPHOCYTES NFR BLD AUTO: 12.1 %
MCH RBC QN AUTO: 21.2 PG (ref 27–31)
MCHC RBC AUTO-ENTMCNC: 29.7 G/DL (ref 33–36)
MCV RBC AUTO: 71.3 FL (ref 80–94)
MICROCYTES BLD QL SMEAR: ABNORMAL
MONOCYTES # BLD AUTO: 0.66 X10(3)/MCL (ref 0.1–1.3)
MONOCYTES NFR BLD AUTO: 7.9 %
NEUTROPHILS # BLD AUTO: 6.49 X10(3)/MCL (ref 2.1–9.2)
NEUTROPHILS NFR BLD AUTO: 78.2 %
NRBC BLD AUTO-RTO: 0.2 %
OVALOCYTES (OLG): ABNORMAL
PLATELET # BLD AUTO: 338 X10(3)/MCL (ref 130–400)
PLATELET # BLD EST: ADEQUATE 10*3/UL
PMV BLD AUTO: 9.1 FL (ref 7.4–10.4)
POIKILOCYTOSIS BLD QL SMEAR: ABNORMAL
POTASSIUM SERPL-SCNC: 3.4 MMOL/L (ref 3.5–5.1)
PROT SERPL-MCNC: 7.8 GM/DL (ref 6.4–8.3)
PROTHROMBIN TIME: 27 SECONDS (ref 11.7–14.5)
RBC # BLD AUTO: 3.45 X10(6)/MCL (ref 4.7–6.1)
SCHISTOCYTE (OLG): SLIGHT
SODIUM SERPL-SCNC: 135 MMOL/L (ref 136–145)
TARGETS BLD QL SMEAR: SLIGHT
WBC # BLD AUTO: 8.32 X10(3)/MCL (ref 4.5–11.5)

## 2024-10-08 PROCEDURE — 80053 COMPREHEN METABOLIC PANEL: CPT | Performed by: PHYSICIAN ASSISTANT

## 2024-10-08 PROCEDURE — 86140 C-REACTIVE PROTEIN: CPT | Performed by: PHYSICIAN ASSISTANT

## 2024-10-08 PROCEDURE — 85025 COMPLETE CBC W/AUTO DIFF WBC: CPT | Performed by: PHYSICIAN ASSISTANT

## 2024-10-08 PROCEDURE — 85610 PROTHROMBIN TIME: CPT | Performed by: PHYSICIAN ASSISTANT

## 2024-10-09 ENCOUNTER — ANTI-COAG VISIT (OUTPATIENT)
Dept: CARDIOLOGY | Facility: CLINIC | Age: 39
End: 2024-10-09
Payer: MEDICAID

## 2024-10-09 DIAGNOSIS — Z95.811 LVAD (LEFT VENTRICULAR ASSIST DEVICE) PRESENT: Primary | ICD-10-CM

## 2024-10-09 PROCEDURE — 93793 ANTICOAG MGMT PT WARFARIN: CPT | Mod: ,,,

## 2024-10-09 NOTE — PROGRESS NOTES
[facility]    10/09/2024    Kevan Queen (39 y.o.) is followed by the [facility].      Assessment/Plan:    Kevan Queen presents with a therapeutic INR. Goal INR: 2.0-3.0    Lab Results   Component Value Date    INR 2.4 10/08/2024    INR 2.3 10/07/2024    INR 2.2 10/03/2024       Assessment of patient findings per MA/LPN and chart review:   The following significant findings were found:   None    Recommendation for patient's warfarin regimen:   No change was made to warfarin therapy during this visit and patient has been instructed to continue their current warfarin regimen.    Recommended repeat INR in 1 week      Nela Morgan PharmD, BCPS  Clinical Pharmacist - [facility]  Preferred Contact: Secure Messaging or In Basket Message

## 2024-10-10 ENCOUNTER — EXTERNAL HOME HEALTH (OUTPATIENT)
Dept: HOME HEALTH SERVICES | Facility: HOSPITAL | Age: 39
End: 2024-10-10
Payer: MEDICAID

## 2024-10-10 ENCOUNTER — TELEPHONE (OUTPATIENT)
Dept: PAIN MEDICINE | Facility: HOSPITAL | Age: 39
End: 2024-10-10
Payer: MEDICAID

## 2024-10-14 ENCOUNTER — LAB REQUISITION (OUTPATIENT)
Dept: LAB | Facility: HOSPITAL | Age: 39
End: 2024-10-14
Payer: MEDICAID

## 2024-10-14 DIAGNOSIS — I10 ESSENTIAL (PRIMARY) HYPERTENSION: ICD-10-CM

## 2024-10-14 DIAGNOSIS — I50.30 UNSPECIFIED DIASTOLIC (CONGESTIVE) HEART FAILURE: ICD-10-CM

## 2024-10-14 LAB
ACANTHOCYTES (OLG): SLIGHT
ALBUMIN SERPL-MCNC: 2.6 G/DL (ref 3.5–5)
ALBUMIN/GLOB SERPL: 0.4 RATIO (ref 1.1–2)
ALP SERPL-CCNC: 258 UNIT/L (ref 40–150)
ALT SERPL-CCNC: 6 UNIT/L (ref 0–55)
ANION GAP SERPL CALC-SCNC: 9 MEQ/L
ANISOCYTOSIS BLD QL SMEAR: ABNORMAL
AST SERPL-CCNC: 25 UNIT/L (ref 5–34)
BASOPHILS # BLD AUTO: 0.04 X10(3)/MCL
BASOPHILS NFR BLD AUTO: 0.6 %
BILIRUB SERPL-MCNC: 0.9 MG/DL
BUN SERPL-MCNC: 11.7 MG/DL (ref 8.9–20.6)
CALCIUM SERPL-MCNC: 8.6 MG/DL (ref 8.4–10.2)
CHLORIDE SERPL-SCNC: 101 MMOL/L (ref 98–107)
CO2 SERPL-SCNC: 25 MMOL/L (ref 22–29)
CREAT SERPL-MCNC: 0.99 MG/DL (ref 0.73–1.18)
CREAT/UREA NIT SERPL: 12
CRP SERPL-MCNC: 11.4 MG/L
EOSINOPHIL # BLD AUTO: 0.1 X10(3)/MCL (ref 0–0.9)
EOSINOPHIL NFR BLD AUTO: 1.4 %
ERYTHROCYTE [DISTWIDTH] IN BLOOD BY AUTOMATED COUNT: 26.9 % (ref 11.5–17)
GFR SERPLBLD CREATININE-BSD FMLA CKD-EPI: >60 ML/MIN/1.73/M2
GLOBULIN SER-MCNC: 6 GM/DL (ref 2.4–3.5)
GLUCOSE SERPL-MCNC: 147 MG/DL (ref 74–100)
HCT VFR BLD AUTO: 27.3 % (ref 42–52)
HGB BLD-MCNC: 7.8 G/DL (ref 14–18)
HYPOCHROMIA BLD QL SMEAR: ABNORMAL
IMM GRANULOCYTES # BLD AUTO: 0.02 X10(3)/MCL (ref 0–0.04)
IMM GRANULOCYTES NFR BLD AUTO: 0.3 %
LYMPHOCYTES # BLD AUTO: 1.34 X10(3)/MCL (ref 0.6–4.6)
LYMPHOCYTES NFR BLD AUTO: 19.3 %
MCH RBC QN AUTO: 20.5 PG (ref 27–31)
MCHC RBC AUTO-ENTMCNC: 28.6 G/DL (ref 33–36)
MCV RBC AUTO: 71.8 FL (ref 80–94)
MICROCYTES BLD QL SMEAR: ABNORMAL
MONOCYTES # BLD AUTO: 0.5 X10(3)/MCL (ref 0.1–1.3)
MONOCYTES NFR BLD AUTO: 7.2 %
NEUTROPHILS # BLD AUTO: 4.96 X10(3)/MCL (ref 2.1–9.2)
NEUTROPHILS NFR BLD AUTO: 71.2 %
NRBC BLD AUTO-RTO: 0 %
PLATELET # BLD AUTO: 306 X10(3)/MCL (ref 130–400)
PLATELET # BLD EST: ADEQUATE 10*3/UL
PMV BLD AUTO: 9.7 FL (ref 7.4–10.4)
POIKILOCYTOSIS BLD QL SMEAR: ABNORMAL
POTASSIUM SERPL-SCNC: 3.9 MMOL/L (ref 3.5–5.1)
PROT SERPL-MCNC: 8.6 GM/DL (ref 6.4–8.3)
RBC # BLD AUTO: 3.8 X10(6)/MCL (ref 4.7–6.1)
RBC MORPH BLD: ABNORMAL
SCHISTOCYTE (OLG): SLIGHT
SODIUM SERPL-SCNC: 135 MMOL/L (ref 136–145)
TARGETS BLD QL SMEAR: SLIGHT
WBC # BLD AUTO: 6.96 X10(3)/MCL (ref 4.5–11.5)

## 2024-10-14 PROCEDURE — 86140 C-REACTIVE PROTEIN: CPT | Performed by: PHYSICIAN ASSISTANT

## 2024-10-14 PROCEDURE — 85025 COMPLETE CBC W/AUTO DIFF WBC: CPT | Performed by: PHYSICIAN ASSISTANT

## 2024-10-14 PROCEDURE — 80053 COMPREHEN METABOLIC PANEL: CPT | Performed by: PHYSICIAN ASSISTANT

## 2024-10-15 ENCOUNTER — TELEPHONE (OUTPATIENT)
Dept: PAIN MEDICINE | Facility: HOSPITAL | Age: 39
End: 2024-10-15
Payer: MEDICAID

## 2024-10-15 ENCOUNTER — DOCUMENTATION ONLY (OUTPATIENT)
Dept: TRANSPLANT | Facility: CLINIC | Age: 39
End: 2024-10-15
Payer: MEDICAID

## 2024-10-16 ENCOUNTER — DOCUMENT SCAN (OUTPATIENT)
Dept: HOME HEALTH SERVICES | Facility: HOSPITAL | Age: 39
End: 2024-10-16
Payer: MEDICAID

## 2024-10-16 ENCOUNTER — TELEPHONE (OUTPATIENT)
Dept: TRANSPLANT | Facility: CLINIC | Age: 39
End: 2024-10-16
Payer: MEDICAID

## 2024-10-16 ENCOUNTER — ANTI-COAG VISIT (OUTPATIENT)
Dept: CARDIOLOGY | Facility: CLINIC | Age: 39
End: 2024-10-16
Payer: MEDICAID

## 2024-10-16 DIAGNOSIS — Z95.811 LVAD (LEFT VENTRICULAR ASSIST DEVICE) PRESENT: Primary | ICD-10-CM

## 2024-10-16 LAB — INR PPP: 3.3

## 2024-10-16 PROCEDURE — 93793 ANTICOAG MGMT PT WARFARIN: CPT | Mod: ,,,

## 2024-10-16 NOTE — TELEPHONE ENCOUNTER
Spoke with Lena  assistant for [facility] and she was calling to speak with a Dr to discuss INR orders. I explained to her that all INR needs should come from the coumadin clinic. She had previously contacted them prior to calling me but figured she could talk to a Dr or Nurse about the need/concern. Through conversation she mention Bernice not having dressing supplies and so that whole scenario was explained again along with the importance of him adhering to the rules of clinic and making it to his appts. As noted they are too having an issue with keeping contact with him and being able to reach him at home for the care he needs from them.    Kevan was scheduled for clinic on 10/9 following his most recent d/c and of course did not show. Robyn called stating they were having ar trouble as always.     VCs notified

## 2024-10-16 NOTE — PROGRESS NOTES
Spoke with patient mother Malou Dumont  regarding recent INR results .  Questioned and verified correct dosage . Reported appetite increase and has gained weight - no recent changes .

## 2024-10-17 ENCOUNTER — TELEPHONE (OUTPATIENT)
Dept: INFECTIOUS DISEASES | Facility: CLINIC | Age: 39
End: 2024-10-17
Payer: MEDICAID

## 2024-10-17 NOTE — TELEPHONE ENCOUNTER
Leni with [facility] called,  She spoke to Lena with [facility], nurse reported that the patient had 5 IV abx doses left. JUSTIN is currently getting weekly labs and doing weekly dressing changes.     On 10/4/2024, I called Karly, have him verbal orders to stop the IV therapy, and to continue cath care until the tunneled line is remove.     Called Lena at [phone], gave verbal orders to discontinue weekly ID labs, but to continue weekly dressing changes until Mr. Queen get the tunneled line remove on 10/22/2024. Requested to have the ID orders sent to us.     Spoke to Pal with [facility], he stated no additional antibiotics were sent to the patient. He was scheduled to complete his therapy on 10/9/2024. He's not sure why the patient has 5 extra doses.

## 2024-10-21 ENCOUNTER — ANTI-COAG VISIT (OUTPATIENT)
Dept: CARDIOLOGY | Facility: CLINIC | Age: 39
End: 2024-10-21
Payer: MEDICAID

## 2024-10-21 ENCOUNTER — LAB REQUISITION (OUTPATIENT)
Dept: LAB | Facility: HOSPITAL | Age: 39
End: 2024-10-21
Payer: MEDICAID

## 2024-10-21 DIAGNOSIS — Z95.811 LVAD (LEFT VENTRICULAR ASSIST DEVICE) PRESENT: Primary | ICD-10-CM

## 2024-10-21 DIAGNOSIS — I50.23 ACUTE ON CHRONIC SYSTOLIC (CONGESTIVE) HEART FAILURE: ICD-10-CM

## 2024-10-21 DIAGNOSIS — I11.0 HYPERTENSIVE HEART DISEASE WITH HEART FAILURE: ICD-10-CM

## 2024-10-21 LAB
INR PPP: 2.5 (ref 2–3)
PROTHROMBIN TIME: 27.7 SECONDS (ref 11.7–14.5)

## 2024-10-21 PROCEDURE — 93793 ANTICOAG MGMT PT WARFARIN: CPT | Mod: ,,,

## 2024-10-21 PROCEDURE — 85610 PROTHROMBIN TIME: CPT | Performed by: INTERNAL MEDICINE

## 2024-10-21 NOTE — PROGRESS NOTES
[facility]    10/21/2024    Kevan Queen (39 y.o.) is followed by the [facility].      Assessment/Plan:    Kevan Queen presents with a therapeutic INR. Goal INR: 2.0-3.0    Lab Results   Component Value Date    INR 2.5 10/21/2024    INR 3.3 10/16/2024    INR 2.4 10/08/2024       Assessment of patient findings per MA/LPN and chart review:   The following significant findings were found:   None     Recommendation for patient's warfarin regimen:   No change was made to warfarin therapy during this visit and patient has been instructed to continue their current warfarin regimen.    Recommended repeat INR in 1 week      Nela Morgan PharmD, BCPS  Clinical Pharmacist - [facility]  Preferred Contact: Secure Messaging or In Basket Message

## 2024-10-21 NOTE — PROGRESS NOTES
Franc[facility]  called in a verbal result as: INR 2.5 / PT-27.7 dated today 10/21/24

## 2024-10-21 NOTE — PROGRESS NOTES
Patient advised of dose instructions and verbalized understanding.  Will recheck labs  10/28/24 w/ [facility] P:[phone] fax [phone] orders faxed

## 2024-10-22 ENCOUNTER — TELEPHONE (OUTPATIENT)
Dept: INTERVENTIONAL RADIOLOGY/VASCULAR | Facility: HOSPITAL | Age: 39
End: 2024-10-22
Payer: MEDICAID

## 2024-10-24 ENCOUNTER — ANTI-COAG VISIT (OUTPATIENT)
Dept: CARDIOLOGY | Facility: CLINIC | Age: 39
End: 2024-10-24
Payer: MEDICAID

## 2024-10-24 DIAGNOSIS — Z95.811 LVAD (LEFT VENTRICULAR ASSIST DEVICE) PRESENT: Primary | ICD-10-CM

## 2024-10-24 NOTE — PROGRESS NOTES
10/24- Opened in error. Lena from [facility] thought the INR results were from 10/24 but they were from 10/21.

## 2024-10-25 ENCOUNTER — PATIENT MESSAGE (OUTPATIENT)
Dept: CARDIOTHORACIC SURGERY | Facility: CLINIC | Age: 39
End: 2024-10-25
Payer: MEDICAID

## 2024-10-28 ENCOUNTER — TELEPHONE (OUTPATIENT)
Dept: TRANSPLANT | Facility: CLINIC | Age: 39
End: 2024-10-28
Payer: MEDICAID

## 2024-10-29 ENCOUNTER — DOCUMENT SCAN (OUTPATIENT)
Dept: HOME HEALTH SERVICES | Facility: HOSPITAL | Age: 39
End: 2024-10-29
Payer: MEDICAID

## 2024-10-30 ENCOUNTER — TELEPHONE (OUTPATIENT)
Dept: TRANSPLANT | Facility: CLINIC | Age: 39
End: 2024-10-30
Payer: MEDICAID

## 2024-11-05 ENCOUNTER — DOCUMENT SCAN (OUTPATIENT)
Dept: HOME HEALTH SERVICES | Facility: HOSPITAL | Age: 39
End: 2024-11-05
Payer: MEDICAID

## 2024-11-06 ENCOUNTER — PATIENT MESSAGE (OUTPATIENT)
Dept: CARDIOTHORACIC SURGERY | Facility: CLINIC | Age: 39
End: 2024-11-06
Payer: MEDICAID

## 2024-11-06 ENCOUNTER — DOCUMENT SCAN (OUTPATIENT)
Dept: HOME HEALTH SERVICES | Facility: HOSPITAL | Age: 39
End: 2024-11-06
Payer: MEDICAID

## 2024-11-08 ENCOUNTER — PATIENT MESSAGE (OUTPATIENT)
Dept: TRANSPLANT | Facility: CLINIC | Age: 39
End: 2024-11-08
Payer: MEDICAID

## 2024-11-11 ENCOUNTER — DOCUMENT SCAN (OUTPATIENT)
Dept: HOME HEALTH SERVICES | Facility: HOSPITAL | Age: 39
End: 2024-11-11
Payer: MEDICAID

## 2024-11-15 ENCOUNTER — DOCUMENT SCAN (OUTPATIENT)
Dept: HOME HEALTH SERVICES | Facility: HOSPITAL | Age: 39
End: 2024-11-15
Payer: MEDICAID

## 2024-11-19 ENCOUNTER — TELEPHONE (OUTPATIENT)
Dept: TRANSPLANT | Facility: CLINIC | Age: 39
End: 2024-11-19
Payer: MEDICAID

## 2024-11-19 NOTE — TELEPHONE ENCOUNTER
VAD Contact: Called  Malou   regarding equipment maintenance due at upcoming clinic appointment on 11/21.  Requested  Malou  to bring Mobile Power Unit (MPU) and Emergency Bag (2 batteries, 2 clips, 1 backup controller) with them to next appointment for maintenance.   Malou  verbalized understanding and agreement.    It is medically necessary to ensure patient has properly functioning equipment and wearables to prevent infection, injury or death to patient.

## 2024-11-20 ENCOUNTER — TELEPHONE (OUTPATIENT)
Dept: INTERVENTIONAL RADIOLOGY/VASCULAR | Facility: HOSPITAL | Age: 39
End: 2024-11-20
Payer: MEDICAID

## 2024-11-20 ENCOUNTER — DOCUMENT SCAN (OUTPATIENT)
Dept: HOME HEALTH SERVICES | Facility: HOSPITAL | Age: 39
End: 2024-11-20
Payer: MEDICAID

## 2024-11-20 NOTE — NURSING
Attempted confirmation appointment call several times no answer left message with call back number.  Call other number unable to leave message mailbox full.  Spoke with LVAD coordEnrique Ayala confirmed patient procedure start time and date.

## 2024-11-21 ENCOUNTER — CLINICAL SUPPORT (OUTPATIENT)
Dept: TRANSPLANT | Facility: CLINIC | Age: 39
End: 2024-11-21
Payer: MEDICAID

## 2024-11-21 ENCOUNTER — OFFICE VISIT (OUTPATIENT)
Dept: TRANSPLANT | Facility: CLINIC | Age: 39
End: 2024-11-21
Payer: MEDICAID

## 2024-11-21 ENCOUNTER — HOSPITAL ENCOUNTER (OUTPATIENT)
Dept: PULMONOLOGY | Facility: CLINIC | Age: 39
Discharge: HOME | End: 2024-11-21
Payer: MEDICAID

## 2024-11-21 ENCOUNTER — DOCUMENTATION ONLY (OUTPATIENT)
Dept: CARDIOTHORACIC SURGERY | Facility: CLINIC | Age: 39
End: 2024-11-21
Payer: MEDICAID

## 2024-11-21 ENCOUNTER — HOSPITAL ENCOUNTER (OUTPATIENT)
Dept: INTERVENTIONAL RADIOLOGY/VASCULAR | Facility: HOSPITAL | Age: 39
Discharge: HOME | End: 2024-11-21
Attending: STUDENT IN AN ORGANIZED HEALTH CARE EDUCATION/TRAINING PROGRAM
Payer: MEDICAID

## 2024-11-21 VITALS — HEIGHT: 75 IN | SYSTOLIC BLOOD PRESSURE: 82 MMHG | TEMPERATURE: 98 F | WEIGHT: 161 LBS | BODY MASS INDEX: 20.02 KG/M2

## 2024-11-21 VITALS — HEIGHT: 75 IN | BODY MASS INDEX: 21.76 KG/M2 | WEIGHT: 175 LBS

## 2024-11-21 VITALS — SYSTOLIC BLOOD PRESSURE: 100 MMHG | HEART RATE: 83 BPM | OXYGEN SATURATION: 97 % | RESPIRATION RATE: 17 BRPM

## 2024-11-21 DIAGNOSIS — I10 PRIMARY HYPERTENSION: ICD-10-CM

## 2024-11-21 DIAGNOSIS — Z95.811 HEART REPLACED BY HEART ASSIST DEVICE: Primary | ICD-10-CM

## 2024-11-21 DIAGNOSIS — I50.42 CHRONIC COMBINED SYSTOLIC AND DIASTOLIC HEART FAILURE: ICD-10-CM

## 2024-11-21 DIAGNOSIS — T82.7XXA INFECTION ASSOCIATED WITH DRIVELINE OF LEFT VENTRICULAR ASSIST DEVICE (LVAD): ICD-10-CM

## 2024-11-21 DIAGNOSIS — B95.8 BACTEREMIA DUE TO STAPHYLOCOCCUS: ICD-10-CM

## 2024-11-21 DIAGNOSIS — Z79.2 RECEIVING INTRAVENOUS ANTIBIOTIC TREATMENT AS OUTPATIENT: ICD-10-CM

## 2024-11-21 DIAGNOSIS — Z95.811 LVAD (LEFT VENTRICULAR ASSIST DEVICE) PRESENT: ICD-10-CM

## 2024-11-21 DIAGNOSIS — R78.81 BACTEREMIA DUE TO STAPHYLOCOCCUS: ICD-10-CM

## 2024-11-21 PROCEDURE — 99999PBSHW PR PBB SHADOW TECHNICAL ONLY FILED TO HB: Mod: PBBFAC,,,

## 2024-11-21 PROCEDURE — 99999 PR PBB SHADOW E&M-EST. PATIENT-LVL III: CPT | Mod: PBBFAC,,, | Performed by: INTERNAL MEDICINE

## 2024-11-21 PROCEDURE — 99213 OFFICE O/P EST LOW 20 MIN: CPT | Mod: PBBFAC | Performed by: INTERNAL MEDICINE

## 2024-11-21 PROCEDURE — 94618 PULMONARY STRESS TESTING: CPT | Mod: PBBFAC | Performed by: INTERNAL MEDICINE

## 2024-11-21 RX ORDER — EPLERENONE 25 MG/1
50 TABLET, FILM COATED ORAL DAILY
Qty: 60 TABLET | Refills: 11 | Status: SHIPPED | OUTPATIENT
Start: 2024-11-21 | End: 2025-11-21

## 2024-11-21 RX ORDER — TORSEMIDE 20 MG/1
60 TABLET ORAL 2 TIMES DAILY
Qty: 180 TABLET | Refills: 11 | Status: SHIPPED | OUTPATIENT
Start: 2024-11-21 | End: 2025-11-21

## 2024-11-21 RX ORDER — ONDANSETRON HYDROCHLORIDE 2 MG/ML
4 INJECTION, SOLUTION INTRAVENOUS EVERY 6 HOURS PRN
Status: DISCONTINUED | OUTPATIENT
Start: 2024-11-21 | End: 2024-11-22 | Stop reason: HOSPADM

## 2024-11-21 NOTE — PROGRESS NOTES
Advanced Heart Failure and Transplantation Clinic Follow up.      Attending Physician: Josh Pulido MD.  The patient's last visit with me was on 8/18/2022.         HPI.  39 year old male with stage D 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 He underwent RVAD removal and chest closure 6/30/2022.  He was weaned off  but he had to restarted due to RVF. He was eventually transitioned to milrinone (secondary to  shortage). He was recently admitted after being out of primacor for 1 week with ongoing back pain.     On 11/15/23 underwent removal of eroded [employer] scICD--no Lifevest (due to LVAD) and no plan to replace ICD as not requiring therapy post LVAD with concern for reinfection.     11/22/2024: he was admitted with acute pyelonephritis. Today he denies symptoms. He is asking if he can be put in the transplant list.      Review of Systems   Constitutional:  Negative for chills, diaphoresis and fever.   HENT:  Negative for nasal congestion, rhinorrhea and sore throat.    Eyes:  Negative for visual disturbance.   Cardiovascular:  Negative for chest pain.   Gastrointestinal:  Negative for abdominal pain, diarrhea, nausea and vomiting.   Genitourinary:  Negative for difficulty urinating, dysuria and hematuria.   Integumentary:  Negative for rash.   Neurological:  Negative for seizures, syncope and light-headedness.   Psychiatric/Behavioral:  Negative for agitation and hallucinations.         Past Medical History:   Diagnosis Date    Acute respiratory failure with hypoxia 07/22/2023    Arthritis     Awareness alteration, transient 09/01/2022    Cardiomyopathy     CHF (congestive heart  failure) 10/01/2020    COVID-19 06/03/2023    Diabetes mellitus     Dilated cardiomyopathy 10/26/2020    Drug abuse 10/2020    Headache 04/19/2023    Hyperglycemia 12/16/2022    Hyperosmolar hyperglycemic state (HHS) 05/25/2022    ICD (implantable cardioverter-defibrillator) in place 10/26/2020    Infected defibrillator now s/p removal Nov 2023 07/23/2023    Left ventricular assist device (LVAD) complication, initial encounter 06/05/2023    Muscle cramping 06/15/2022    Renal disorder     SOB (shortness of breath) 06/13/2022    Tingling in extremities 07/13/2022        Past Surgical History:   Procedure Laterality Date    APPLICATION OF WOUND VACUUM-ASSISTED CLOSURE DEVICE N/A 6/30/2022    Procedure: APPLICATION, WOUND VAC;  Surgeon: Luis F Paige MD;  [facility];  Service: Cardiovascular;  Laterality: N/A;  50 x 5 cm    CARDIAC DEFIBRILLATOR PLACEMENT      COLONOSCOPY N/A 9/25/2024    Procedure: COLONOSCOPY;  Surgeon: Drake Barton MD;  [facility]  Service: Endoscopy;  Laterality: N/A;    ECHOCARDIOGRAM,TRANSESOPHAGEAL  11/9/2023    Procedure: Transesophageal echo (GUILLERMO) intra-procedure log documentation;  Surgeon: Eron Ivy MD;  Location: [facility] EP LAB;  Service: Cardiology;;    ESOPHAGOGASTRODUODENOSCOPY N/A 9/25/2024    Procedure: EGD (ESOPHAGOGASTRODUODENOSCOPY);  Surgeon: Drake Barton MD;  [facility]  Service: Endoscopy;  Laterality: N/A;    EXTRACTION, ELECTRODE LEAD Left 11/9/2023    Procedure: EXTRACTION, ELECTRODE LEAD;  Surgeon: ADALID Leon MD;  Location: [facility] EP LAB;  Service: Cardiology;  Laterality: Left;  INFECTION, LEAD EXTRACTION, GUILLERMO, BSCI, ANES, EH,   *NO CTS BACKUP*    IMPLANTATION OF RIGHT VENTRICULAR ASSIST DEVICE (RVAD) N/A 6/29/2022    Procedure: INSERTION, RVAD;  Surgeon: Luis F Paige MD;  Location: [facility] OR 2ND FLR;  Service: Cardiovascular;  Laterality: N/A;    INSERTION OF GRAFT TO PERICARDIUM Right  6/30/2022    Procedure: INSERTION-RIGHT VENTRICULAR ASSIST DEVICE;  Surgeon: Luis F Paige MD;  Location: [facility] OR 2ND FLR;  Service: Cardiovascular;  Laterality: Right;    IRRIGATION OF MEDIASTINUM  6/30/2022    Procedure: IRRIGATION, MEDIASTINUM;  Surgeon: Luis F Paige MD;  Location: [facility] OR 2ND FLR;  Service: Cardiovascular;;    LEFT VENTRICULAR ASSIST DEVICE Left 6/23/2022    Procedure: INSERTION-LEFT VENTRICULAR ASSIST DEVICE;  Surgeon: Luis F Paige MD;  Location: [facility] OR [facility] FLR;  Service: Cardiovascular;  Laterality: Left;    LEFT VENTRICULAR ASSIST DEVICE N/A 6/29/2022    Procedure: INSERTION-LEFT VENTRICULAR ASSIST DEVICE;  Surgeon: Luis F Paige MD;  Location: [facility] OR [facility] FLR;  Service: Cardiovascular;  Laterality: N/A;    REVISION OF SKIN POCKET FOR CARDIOVERTER-DEFIBRILLATOR  11/9/2023    Procedure: REVISION, SKIN POCKET, FOR CARDIOVERTER-DEFIBRILLATOR;  Surgeon: ADALID Leon MD;  Location: [facility] EP LAB;  Service: Cardiology;;    RIGHT HEART CATHETERIZATION Right 4/8/2022    Procedure: INSERTION, CATHETER, RIGHT HEART;  Surgeon: Luca Lopez Jr., MD;  Location: [facility] CATH LAB;  Service: Cardiology;  Laterality: Right;    RIGHT HEART CATHETERIZATION Right 4/19/2022    Procedure: INSERTION, CATHETER, RIGHT HEART;  Surgeon: Josh Pulido MD;  Location: [facility] CATH LAB;  Service: Cardiology;  Laterality: Right;    RIGHT HEART CATHETERIZATION Right 7/21/2022    Procedure: INSERTION, CATHETER, RIGHT HEART;  Surgeon: Dalia Crum MD;  Location: [facility] CATH LAB;  Service: Cardiology;  Laterality: Right;    RIGHT HEART CATHETERIZATION Right 10/31/2022    Procedure: INSERTION, CATHETER, RIGHT HEART;  Surgeon: Dalia Crum MD;  Location: [facility] CATH LAB;  Service: Cardiology;  Laterality: Right;    STERNAL WOUND CLOSURE N/A 6/30/2022    Procedure: CLOSURE, WOUND, STERNUM;  Surgeon: Luis F Paige MD;  [facility];  Service: Cardiovascular;  Laterality: N/A;        Family  History   Problem Relation Name Age of Onset    Heart failure Father      Diverticulosis Brother      Heart attack Maternal Grandmother      Heart attack Maternal Grandfather          Review of patient's allergies indicates:   Allergen Reactions    Torsemide Hives        Current Outpatient Medications   Medication Instructions    acetaminophen (TYLENOL) 650 mg, Oral, Every 6 hours PRN    amiodarone (PACERONE) 200 mg, Oral, Daily    amLODIPine (NORVASC) 5 mg, Oral, Daily    aspirin (ECOTRIN) 81 mg, Oral, Daily    atorvastatin (LIPITOR) 40 mg, Oral, Daily    blood sugar diagnostic Strp Use to test blood glucose 4 (four) times daily.    blood-glucose meter (TRUE METRIX GLUCOSE METER) Misc Use to test blood glucose 4 (four) times daily.    blood-glucose sensor (FREESTYLE LUCIUS 3 SENSOR) Dee 1 Device, Misc.(Non-Drug; Combo Route), Every 14 days    cefadroxil (DURICEF) 500 mg, Oral, Every 12 hours    D5W PgBk 50 mL with ceFAZolin 2 gram SolR 6 g 6 g, Intravenous, Daily    DULoxetine (CYMBALTA) 30 mg, Oral, Daily    eplerenone (INSPRA) 25 mg, Oral, Daily    furosemide (LASIX) 80 mg, Oral, 2 times daily    gabapentin (NEURONTIN) 100 MG capsule Take 1 capsule (100 mg total) by mouth 2 (two) times daily AND 4 capsules (400 mg total) every evening.    glucagon (BAQSIMI) 3 mg/actuation Spry 1 each, Nasal, As needed (PRN)    insulin aspart U-100 (NOVOLOG) 100 unit/mL (3 mL) InPn pen Inject 18 Units into the skin 3 (three) times daily with meals: MAX 94 units/daily  150-200    201-250    251-300    301-350    >350  +2 units   +4 units    +6 units    +8 units    +10 units    insulin detemir U-100 (Levemir) 12 Units, Subcutaneous, 2 times daily, In the morning and before bed.    lancets 33 gauge Misc Use to test blood glucose 4 (four) times daily.    levETIRAcetam (KEPPRA) 1,500 mg, Oral, 2 times daily    magnesium oxide (MAG-OX) 400 mg, Oral, Daily    ondansetron (ZOFRAN-ODT) 4 mg, Oral, Every 8 hours PRN    pantoprazole  "(PROTONIX) 40 mg, Oral, Daily    pen needle, diabetic 32 gauge x 5/32" Ndle 1 each, Misc.(Non-Drug; Combo Route), 4 times daily    polyethylene glycol (GLYCOLAX) 17 g, Oral, Daily    potassium chloride SA (K-DUR,KLOR-CON M) 10 MEQ tablet 30 mEq, Oral, Daily    senna-docusate 8.6-50 mg (PERICOLACE) 8.6-50 mg per tablet 1 tablet, Oral, Daily PRN    warfarin (COUMADIN) 1.5 mg, Oral, Daily        Vitals:    11/21/24 1432   BP: (!) 82/0   Temp: 97.8 °F (36.6 °C)        Wt Readings from Last 3 Encounters:   11/21/24 73 kg (161 lb)   11/21/24 79.4 kg (175 lb)   09/27/24 67.6 kg (149 lb 0.5 oz)     Temp Readings from Last 3 Encounters:   11/21/24 97.8 °F (36.6 °C) (Oral)   09/29/24 97.9 °F (36.6 °C) (Oral)   09/23/24 98.5 °F (36.9 °C)     BP Readings from Last 3 Encounters:   11/21/24 (!) 82/0   09/29/24 (!) 88/0   09/10/24 (!) 90/0     Pulse Readings from Last 3 Encounters:   09/29/24 89   09/23/24 85   09/09/24 84        Body mass index is 20.12 kg/m². Estimated body surface area is 1.97 meters squared as calculated from the following:    Height as of this encounter: 6' 3" (1.905 m).    Weight as of this encounter: 73 kg (161 lb).     Physical Exam  Constitutional:       Appearance: He is well-developed.   HENT:      Head: Normocephalic and atraumatic.      Right Ear: External ear normal.      Left Ear: External ear normal.   Eyes:      Conjunctiva/sclera: Conjunctivae normal.      Pupils: Pupils are equal, round, and reactive to light.   Neck:      Vascular: Hepatojugular reflux and JVD present.      Comments: JVP 18 cmH20.  Cardiovascular:      Rate and Rhythm: Normal rate and regular rhythm.      Pulses: Intact distal pulses.      Heart sounds: S1 normal and S2 normal. No murmur heard.     No friction rub. No gallop.   Pulmonary:      Effort: Pulmonary effort is normal.      Breath sounds: Normal breath sounds.   Abdominal:      General: Bowel sounds are normal. There is no distension.      Palpations: Abdomen is soft. "      Tenderness: There is no abdominal tenderness. There is no guarding or rebound.   Musculoskeletal:      Cervical back: Normal range of motion and neck supple.      Right lower leg: No edema.      Left lower leg: No edema.   Neurological:      Mental Status: He is alert and oriented to person, place, and time.          Lab Results   Component Value Date     (H) 09/27/2024     (L) 10/14/2024     (L) 09/29/2024    K 3.9 10/14/2024    K 3.6 09/29/2024    MG 1.6 09/29/2024     10/14/2024    CL 94 (L) 09/29/2024    CO2 25 10/14/2024    CO2 29 09/29/2024    BUN 11.7 10/14/2024    BUN 15 09/29/2024    CREATININE 0.99 10/14/2024    CREATININE 1.2 09/29/2024     (H) 09/29/2024    HGBA1C 10.3 (H) 08/25/2024    AST 25 10/14/2024    AST 35 09/27/2024    ALT 6 10/14/2024    ALT 5 (L) 09/27/2024    ALBUMIN 2.6 (L) 10/14/2024    ALBUMIN 2.5 (L) 09/27/2024    PROT 8.3 09/27/2024    BILITOT 0.9 10/14/2024    BILITOT 1.3 (H) 09/27/2024    WBC 6.96 10/14/2024    WBC 8.15 09/29/2024    WBC 8.15 09/29/2024    HGB 7.8 (L) 10/14/2024    HGB 7.5 (L) 09/29/2024    HGB 7.5 (L) 09/29/2024    HCT 27.3 (L) 10/14/2024    HCT 26.3 (L) 09/29/2024    HCT 26.3 (L) 09/29/2024    HCT 27 (L) 09/25/2024     10/14/2024     09/29/2024     09/29/2024    INR 2.5 10/21/2024    INR 3.3 10/16/2024    INR 1.0 08/31/2023     09/29/2024    TSH 2.009 08/26/2024    CHOL 118 (L) 08/31/2023    HDL 37 (L) 08/31/2023    LDLCALC 63.2 08/31/2023    TRIG 89 08/31/2023    FREET4 0.96 04/13/2022       @LABRCNTIP(cpk,cpkmb,troponini,mb)@     No results found for this visit on 11/21/24.       Results for orders placed during the hospital encounter of 08/25/24    Echo    Interpretation Summary    LVAD: There is a Heartmate III LVAD running at 5,100 RPM. The aortic valve does not open. The ventricular septum is at midline. Inflow cannula well seated at the apex.    Left Ventricle: The left ventricle is severely  dilated. Normal wall thickness. Severe global hypokinesis present. There is severely reduced systolic function with a visually estimated ejection fraction of 5 - 10%. Unable to assess diastolic function due to LVAD.    Right Ventricle: Severe right ventricular enlargement. Right ventricle wall motion has global hypokinesis. Systolic function is moderately reduced.    Biatrial enlargement    Mitral Valve: The mitral valve is repaired with an Elyse stitch. There is mild to moderate regurgitation.    Tricuspid Valve: There is severe regurgitation.    Pulmonary Artery: The estimated pulmonary artery systolic pressure is 33 mmHg.    IVC/SVC: Elevated venous pressure at 15 mmHg.        Results for orders placed during the hospital encounter of 11/08/23    Intra-Procedure Documentation    Narrative  GUILLERMO performed in the Invasive Lab  - See Procedure Log link below for nursing documentation  - See GUILLERMO order on Card Proc Tab for physician findings         Assessment and Plan:  LVAD (left ventricular assist device) present          Chronic cardiomyopathy and systolic HF, NYHA class 2, stage D, s/p LVAD.  Hemodynamic status: warm, normotensive, hypervolemic.    Antithrombotic therapy and target anticoagulation: INR/Prothrombin Time 3.2. Adjustment to warfarin dose per anticoagulation clinic.      LVAD related complications:   -Bleeding (gastrointestinal, epistaxis, etc): none.  -RV failure: no overt.  -Thrombotic events and LDH:  none, ldh 308.  -DLES and Infection: 1.    GDMT: he is currently on eplerenone 25 mg daily. Not on bblocker due to RV dysfunction. Not on ACEi or ARB.    Plan:  -Stop furosemide 80 mg twice a day.  -Start torsemide 60 mg twice a day.  -Increase eplerenone from 25 mg daily to 50 mg daily.  -F/u blood test next week.

## 2024-11-21 NOTE — H&P
Radiology History & Physical      SUBJECTIVE:     Chief Complaint: tunneled cath removal    History of Present Illness:  Kevan Queen is a 39 y.o. male who presents for tunneled cath removal.   Past Medical History:   Diagnosis Date    Acute respiratory failure with hypoxia 07/22/2023    Arthritis     Awareness alteration, transient 09/01/2022    Cardiomyopathy     CHF (congestive heart failure) 10/01/2020    COVID-19 06/03/2023    Diabetes mellitus     Dilated cardiomyopathy 10/26/2020    Drug abuse 10/2020    Headache 04/19/2023    Hyperglycemia 12/16/2022    Hyperosmolar hyperglycemic state (HHS) 05/25/2022    ICD (implantable cardioverter-defibrillator) in place 10/26/2020    Infected defibrillator now s/p removal Nov 2023 07/23/2023    Left ventricular assist device (LVAD) complication, initial encounter 06/05/2023    Muscle cramping 06/15/2022    Renal disorder     SOB (shortness of breath) 06/13/2022    Tingling in extremities 07/13/2022     Past Surgical History:   Procedure Laterality Date    APPLICATION OF WOUND VACUUM-ASSISTED CLOSURE DEVICE N/A 6/30/2022    Procedure: APPLICATION, WOUND VAC;  Surgeon: Luis F Paige MD;  Location: [facility] OR [address] [place] [address];  Service: Cardiovascular;  Laterality: N/A;  50 x 5 cm    CARDIAC DEFIBRILLATOR PLACEMENT      COLONOSCOPY N/A 9/25/2024    Procedure: COLONOSCOPY;  Surgeon: Drake Barton MD;  Location: [facility] ENDO ([address] [place] [address]);  Service: Endoscopy;  Laterality: N/A;    ECHOCARDIOGRAM,TRANSESOPHAGEAL  11/9/2023    Procedure: Transesophageal echo (GUILLERMO) intra-procedure log documentation;  Surgeon: Eron Ivy MD;  Location: [facility] EP LAB;  Service: Cardiology;;    ESOPHAGOGASTRODUODENOSCOPY N/A 9/25/2024    Procedure: EGD (ESOPHAGOGASTRODUODENOSCOPY);  Surgeon: Drake Barton MD;  [facility]  Service: Endoscopy;  Laterality: N/A;    EXTRACTION, ELECTRODE LEAD Left 11/9/2023    Procedure: EXTRACTION, ELECTRODE LEAD;  Surgeon: ADALID Leon  MD Denise;  Location: [facility] EP LAB;  Service: Cardiology;  Laterality: Left;  INFECTION, LEAD EXTRACTION, GUILLERMO, BSCI, ANES, EH,   *NO CTS BACKUP*    IMPLANTATION OF RIGHT VENTRICULAR ASSIST DEVICE (RVAD) N/A 6/29/2022    Procedure: INSERTION, RVAD;  Surgeon: Luis F Paige MD;  Location: [facility] OR 2ND FLR;  Service: Cardiovascular;  Laterality: N/A;    INSERTION OF GRAFT TO PERICARDIUM Right 6/30/2022    Procedure: INSERTION-RIGHT VENTRICULAR ASSIST DEVICE;  Surgeon: Luis F Paige MD;  Location: [facility] OR 2ND FLR;  Service: Cardiovascular;  Laterality: Right;    IRRIGATION OF MEDIASTINUM  6/30/2022    Procedure: IRRIGATION, MEDIASTINUM;  Surgeon: Luis F Paige MD;  Location: [facility] OR [facility] FLR;  Service: Cardiovascular;;    LEFT VENTRICULAR ASSIST DEVICE Left 6/23/2022    Procedure: INSERTION-LEFT VENTRICULAR ASSIST DEVICE;  Surgeon: Luis F Paige MD;  Location: [facility] OR [facility] FLR;  Service: Cardiovascular;  Laterality: Left;    LEFT VENTRICULAR ASSIST DEVICE N/A 6/29/2022    Procedure: INSERTION-LEFT VENTRICULAR ASSIST DEVICE;  Surgeon: Luis F Paige MD;  Location: [facility] OR 2ND FLR;  Service: Cardiovascular;  Laterality: N/A;    REVISION OF SKIN POCKET FOR CARDIOVERTER-DEFIBRILLATOR  11/9/2023    Procedure: REVISION, SKIN POCKET, FOR CARDIOVERTER-DEFIBRILLATOR;  Surgeon: ADALID Leon MD;  Location: [facility] EP LAB;  Service: Cardiology;;    RIGHT HEART CATHETERIZATION Right 4/8/2022    Procedure: INSERTION, CATHETER, RIGHT HEART;  Surgeon: Luca Lopez Jr., MD;  Location: [facility] CATH LAB;  Service: Cardiology;  Laterality: Right;    RIGHT HEART CATHETERIZATION Right 4/19/2022    Procedure: INSERTION, CATHETER, RIGHT HEART;  Surgeon: Josh Pulido MD;  Location: [facility] CATH LAB;  Service: Cardiology;  Laterality: Right;    RIGHT HEART CATHETERIZATION Right 7/21/2022    Procedure: INSERTION, CATHETER, RIGHT HEART;  Surgeon: Dalia Crum MD;  Location: [facility] CATH LAB;  Service: Cardiology;  Laterality:  Right;    RIGHT HEART CATHETERIZATION Right 10/31/2022    Procedure: INSERTION, CATHETER, RIGHT HEART;  Surgeon: Dalia Crum MD;  Location: [facility] CATH LAB;  Service: Cardiology;  Laterality: Right;    STERNAL WOUND CLOSURE N/A 6/30/2022    Procedure: CLOSURE, WOUND, STERNUM;  Surgeon: Luis F Paige MD;  Location: [facility] OR [address] [place] [address];  Service: Cardiovascular;  Laterality: N/A;       Home Meds:   Prior to Admission medications    Medication Sig Start Date End Date Taking? Authorizing Provider   acetaminophen (TYLENOL) 325 MG tablet Take 2 tablets (650 mg total) by mouth every 6 (six) hours as needed for Pain. 9/11/23   Marlo Marques MD   amiodarone (PACERONE) 200 MG Tab Take 1 tablet (200 mg total) by mouth once daily. 9/17/24 9/17/25  Natalya Villatoro MD   amLODIPine (NORVASC) 5 MG tablet Take 1 tablet (5 mg total) by mouth once daily. 9/17/24 9/17/25  Natalya Villatoro MD   aspirin (ECOTRIN) 81 MG EC tablet Take 1 tablet (81 mg total) by mouth once daily. 8/9/23   Josh Ryan MD   atorvastatin (LIPITOR) 40 MG tablet Take 1 tablet (40 mg total) by mouth once daily. 9/17/24 9/17/25  Natalya Villatoro MD   blood sugar diagnostic Strp Use to test blood glucose 4 (four) times daily. 1/9/24   Eron Lees MD   blood-glucose meter (TRUE METRIX GLUCOSE METER) Misc Use to test blood glucose 4 (four) times daily. 11/15/23      blood-glucose sensor (FREESTYLE LUCIUS 3 SENSOR) Dee 1 Device by Misc.(Non-Drug; Combo Route) route every 14 (fourteen) days. 4/26/24 4/26/25  Susanna Doty PA-C   cefadroxil (DURICEF) 500 MG Cap Take 1 capsule (500 mg total) by mouth every 12 (twelve) hours. 10/10/24 10/10/25  Laura Baldwin MD   D5W PgBk 50 mL with ceFAZolin 2 gram SolR 6 g Inject 6 g into the vein once daily. 9/9/24   Anita Ovalles MD   DULoxetine (CYMBALTA) 30 MG capsule Take 1 capsule (30 mg total) by mouth once daily. 9/17/24 9/17/25  Natalya Villatoro MD   eplerenone (INSPRA) 25 MG Tab Take 2  "tablets (50 mg total) by mouth once daily. 11/21/24 11/21/25  Josh Ryan MD   gabapentin (NEURONTIN) 100 MG capsule Take 1 capsule (100 mg total) by mouth 2 (two) times daily AND 4 capsules (400 mg total) every evening. 9/17/24 9/17/25  Natalya Villatoro MD   glucagon (BAQSIMI) 3 mg/actuation Spry 1 each by Nasal route as needed (hypoglycemia). 4/26/24   Susanna Doty PA-C   insulin aspart U-100 (NOVOLOG) 100 unit/mL (3 mL) InPn pen Inject 18 Units into the skin 3 (three) times daily with meals: MAX 94 units/daily  150-200    201-250    251-300    301-350    >350  +2 units   +4 units    +6 units    +8 units    +10 units 9/9/24   Natalya Villatoro MD   insulin detemir U-100, Levemir, 100 unit/mL (3 mL) SubQ InPn pen Inject 12 Units into the skin 2 (two) times daily. In the morning and before bed. 9/9/24 12/8/24  Natalya Villatoro MD   lancets 33 gauge Misc Use to test blood glucose 4 (four) times daily. 11/15/23   Dalia Crum MD   levETIRAcetam (KEPPRA) 1000 MG tablet Take 1.5 tablets (1,500 mg total) by mouth 2 (two) times daily. 9/17/24   Natalya Villatoro MD   magnesium oxide (MAG-OX) 400 mg (241.3 mg magnesium) tablet Take 1 tablet (400 mg total) by mouth once daily. 9/17/24   Natalya Villatoro MD   ondansetron (ZOFRAN-ODT) 4 MG TbDL Take 1 tablet (4 mg total) by mouth every 8 (eight) hours as needed (nausea). 2/4/24   Prasad Banda MD   pantoprazole (PROTONIX) 40 MG tablet Take 1 tablet (40 mg total) by mouth once daily. 9/17/24 9/17/25  Natalya Villatoro MD   pen needle, diabetic 32 gauge x 5/32" Ndle 1 each by Misc.(Non-Drug; Combo Route) route 4 (four) times daily. 4/26/24   Susanna Doty PA-C   polyethylene glycol (GLYCOLAX) 17 gram PwPk Take 17 g by mouth once daily. 9/30/24   Jeff Spencer PA-C   potassium chloride SA (K-DUR,KLOR-CON M) 10 MEQ tablet Take 3 tablets (30 mEq total) by mouth once daily. 9/17/24   Natalya Villatoro MD   senna-docusate 8.6-50 mg (PERICOLACE) 8.6-50 mg per " tablet Take 1 tablet by mouth daily as needed for Constipation. 9/29/24   Jeff Spencer PA-C   torsemide (DEMADEX) 20 MG Tab Take 3 tablets (60 mg total) by mouth 2 (two) times a day. 11/21/24 11/21/25  Josh Ryan MD   warfarin (COUMADIN) 3 MG tablet Take 0.5 tablets (1.5 mg total) by mouth Daily. 9/17/24   Natalya Villatoro MD   digoxin (LANOXIN) 125 mcg tablet Take 1 tablet (0.125 mg total) by mouth once daily. 4/26/22 5/27/22  Jeff Spencer PA-C   eplerenone (INSPRA) 25 MG Tab Take 1 tablet (25 mg total) by mouth once daily. 9/17/24 11/21/24  Natalya Villatoro MD   EScitalopram oxalate (LEXAPRO) 5 MG Tab Take 1 tablet (5 mg total) by mouth once daily. 5/27/22 7/27/22  Luca Lopez Jr., MD   furosemide (LASIX) 40 MG tablet Take 2 tablets (80 mg total) by mouth 2 (two) times daily. 9/30/24 11/21/24  Jeff Spencer PA-C   insulin (LANTUS SOLOSTAR U-100 INSULIN) glargine 100 units/mL (3mL) SubQ pen Inject 10 Units into the skin every evening.  Patient not taking: Reported on 5/24/2022 5/20/22 5/27/22  Bautista Lynch III, MD   metOLazone (ZAROXOLYN) 2.5 MG tablet Take 2.5 mg by mouth every other day. 11/25/21 4/25/22  Provider, Historical   metoprolol succinate (TOPROL-XL) 25 MG 24 hr tablet TAKE 1 TABLET(25 MG) BY MOUTH EVERY DAY 5/19/21 4/25/22  Luca Lopez Jr., MD     Anticoagulants/Antiplatelets: warfarin/aspirin    Allergies:   Review of patient's allergies indicates:   Allergen Reactions    Torsemide Hives     Sedation History:  have not been any systemic reactions    Labs:  Lab Results   Component Value Date    INR 2.5 10/21/2024       Lab Results   Component Value Date    WBC 7.71 11/21/2024    HGB 8.3 (L) 11/21/2024    HCT 28.8 (L) 11/21/2024    MCV 68 (L) 11/21/2024     11/21/2024     Lab Results   Component Value Date     (H) 11/21/2024     (L) 11/21/2024    K 2.8 (L) 11/21/2024    CL 95 11/21/2024    CO2 26 11/21/2024    BUN 10 11/21/2024    CREATININE 1.2  11/21/2024    CALCIUM 8.1 (L) 11/21/2024    MG 1.7 11/21/2024    ALT 18 11/21/2024    AST 35 11/21/2024    ALBUMIN 2.6 (L) 11/21/2024    BILITOT 1.3 (H) 11/21/2024    BILIDIR 0.9 (H) 11/21/2024       Review of Systems:   Hematological: no known coagulopathies  Respiratory: no shortness of breath  Cardiovascular: no chest pain  Gastrointestinal: no abdominal pain  Genito-Urinary: no dysuria  Musculoskeletal: negative  Neurological: no TIA or stroke symptoms         OBJECTIVE:     Vital Signs (Most Recent)       Physical Exam:  ASA: 3  Mallampati: II/III    General: no acute distress  Mental Status: alert and oriented to person, place and time  HEENT: normocephalic, atraumatic  Chest: unlabored breathing  Heart: regular heart rate  Abdomen: nondistended  Extremity: moves all extremities    ASSESSMENT/PLAN:     Sedation Plan: local only  Patient will undergo tunneled line removal.    Chandrakant Augustine MD  Diagnostic Radiology, PGY-2

## 2024-11-21 NOTE — PLAN OF CARE
Tunneled line removal procedure completed. Patient tolerated well. Patient AAOx3, no distress noted, respirations even and unlabored, VSS, assessed per sonu. Right chestt procedure site clean, dry, and intact; no bleeding or hematoma noted. Patient escorted ambulated to pt waiting to meet family for discharge. Pt stable for discharge. LVAD intact with extra supplies

## 2024-11-21 NOTE — PLAN OF CARE
Patient arrived to IR Room 190 for tunneled line removal. LVAD present. Vital signs obtained. Awaiting consent.

## 2024-11-21 NOTE — LETTER
November 22, 2024        Inderjit Hendricks  [address]  [address]  [place] [address]  Phone: [phone]  Fax: [phone]     Josh Pulido  [address] [address]  Phone: [phone]  Fax: [phone]             [id number]  [address]  [place] [address]  Phone: [phone]   Patient: Kevan Queen   MR Number: [id number]   YOB: 1985   Date of Visit: 11/21/2024       Dear Dr. Inderjit Hendricks, Josh Pulido    Thank you for referring Kevan Queen to me for evaluation. Attached you will find relevant portions of my assessment and plan of care.    If you have questions, please do not hesitate to call me. I look forward to following Kevan Queen along with you.    Sincerely,    Josh Pulido MD    Enclosure    If you would like to receive this communication electronically, please contact [email or web address] or [phone] to request [employer] Link access.    [employer] Link is a tool which provides read-only access to select patient information with whom you have a relationship. Its easy to use and provides real time access to review your patients record including encounter summaries, notes, results, and demographic information.    If you feel you have received this communication in error or would no longer like to receive these types of communications, please e-mail [email or web address]    Chief Complaint   Patient presents with     New Patient     [facility] NEW LEG WEAKNESS       Depression Response    Patient completed the PHQ-9 assessment for depression and scored >9? Yes  Question 9 on the PHQ-9 was positive for suicidality? No  Is the patient already receiving treatment for depression? Yes  Patient would like to speak with behavioral health team ([facility] only)? No    I personally notified the following: clinic nurse    Behavioral Health/Social Work Contact Information     [facility]  Hector Kahn MA, LMFT  Lead Behavioral Health Clinician  Phone: [phone]  [facility] Pager: [phone]    Non-[facility]  [facility] On-Call   Pager: [phone]       Mariama Yuen LPN

## 2024-11-21 NOTE — DISCHARGE SUMMARY
Radiology Discharge Summary      Hospital Course: No complications    Admit Date: 11/21/2024  Discharge Date: 11/21/2024     Instructions Given to Patient: Yes  Diet: Resume prior diet  Activity: activity as tolerated and no driving for today    Description of Condition on Discharge: Stable  Vital Signs (Most Recent): Resp: 15 (11/21/24 1638)  BP: (!) 98/0 (11/21/24 1638)  SpO2: 97 % (11/21/24 1638)    Discharge Disposition: Home    Discharge Diagnosis: CHF, s/p right chest tunneled line removal.    Follow up: As scheduled    Jaziel Hill M.D.  Interventional Radiology  Department of Radiology

## 2024-11-21 NOTE — PROCEDURES
Kevan Queen is a 39 y.o.   male patient, who presents for a 6 minute walk test ordered by MD Ahsan.  The diagnosis is Left Ventricular Assist Device; Cardiomyopathy.  The patient's BMI is 21.9 kg/m2.  Predicted distance (lower limit of normal) is 593.48 meters.      Test Results:    The test was not completed.  The patient stopped 4 times for a total of 125 seconds.  The total time walked was 235 seconds.  During walking, the patient reported:  Dizziness, Dyspnea, Other (Comment) (weakness).  The patient used a walker during testing.     11/21/2024---------Distance: 171.3 meters (562 feet)     O2 Sat % Supplemental Oxygen Heart Rate Blood Pressure Ria Scale   Pre-exercise  (Resting) 95 % Room Air Unable to obtain Unable to obtain 0   During Exercise 94 % Room Air Unable to obtain Unable to obtain 5-6   Post-exercise  (Recovery) Unable to obtain Room Air         Recovery Time: 55 seconds              The patient walked the first 15 feet in 5.58 seconds.    Performing nurse/tech: PATRICIA Roberts      PREVIOUS STUDY:   01/03/2024---------Distance: 206.35 meters (677 feet)       O2 Sat % Supplemental Oxygen Heart Rate Blood Pressure Ria Scale   Pre-exercise  (Resting) 100 % Room Air 30 bpm Unable to obtain 4   During Exercise Unable to obtain Room Air Unable to obtain Unable to obtain 5-6   Post-exercise  (Recovery) Unable to obtain Room Air     3      Recovery Time: 480 seconds              The patient walked the first 15 feet in 5.00 seconds.      CLINICAL INTERPRETATION:  Six minute walk distance is 171.3 meters (562 feet) with heavy dyspnea.  During exercise, there was no significant desaturation while breathing room air.  The patient reported non-pulmonary symptoms during exercise.  Significant exercise impairment is likely due to subjective symptoms.  The patient did not complete the study, walking 235 seconds of the 360 second test.  Since the previous study in January 2024, exercise capacity  may be somewhat worse.  Based upon age and body mass index, exercise capacity is less than predicted.

## 2024-11-21 NOTE — PROGRESS NOTES
Pt presented for 30 month follow-up and verbalized consent and willingness to continue to participate with the INTER[facility] registry.      Patient completed the EQ-5D quality of life questionnaire, KCCQ, and the Trail [employer] neurocognitive test in 167 seconds.

## 2024-11-21 NOTE — PROCEDURES
Radiology Post-Procedure Note    Pre Op Diagnosis: CHF    Post Op Diagnosis: Same    Procedure: Tunneled line removal    Procedure performed by: Jaziel Hill MD    Written Informed Consent Obtained: Yes  Specimen Removed: YES Right chest tunneled line  Estimated Blood Loss: Minimal    Findings:   Right chest tunneled line removed without complications. See dictation.    Patient tolerated procedure well.    Jaziel Hill M.D.  Interventional Radiology  Department of Radiology

## 2024-11-21 NOTE — DISCHARGE INSTRUCTIONS
"Please call with any questions or concerns.    Monday through Friday 8:00 am - 4:30 pm  Interventional Radiology Clinic  [phone]    Urgent concerns after hours/weekends  Ask the  for the "Interventional Radiology Physician on call"  [phone]     "

## 2024-11-21 NOTE — Clinical Note
@0907 PS  per    RE:  @[id number]  called back and spoke to     There was an existing generator. The generator was removed. The leads were disconnected. The generator was returned to . The generator was returned due to infection

## 2024-11-21 NOTE — PATIENT INSTRUCTIONS
You have extra fluid on you.  Please adhere to a low sodium diet (no more than 1.5 grams of sodium in 24h).  3.   Follow fluid restriction of  1. no more than 2 liters in 24 hours..   4. Stop furosemide 80 mg twice a day.  5. Start torsemide 60 mg twice a day.  6. Increase eplerenone from 25 mg daily to 50 mg daily.  7. F/u blood test next week.

## 2024-11-22 ENCOUNTER — ANTI-COAG VISIT (OUTPATIENT)
Dept: CARDIOLOGY | Facility: CLINIC | Age: 39
End: 2024-11-22
Payer: MEDICAID

## 2024-11-22 DIAGNOSIS — Z95.811 LVAD (LEFT VENTRICULAR ASSIST DEVICE) PRESENT: Primary | ICD-10-CM

## 2024-11-22 NOTE — PROGRESS NOTES
Patient advised of dose instructions and verbalized understanding.  Will recheck labs  11/25/24.    Pt mother Malou stated that patient will be having  visits with [facility] P:[phone] fax [phone].  But ,when I spoke with Marielos fox/ [facility] she stated that patient doesn't have home health services with them and he was discharged.    Left message to get patient r/s at his local lab

## 2024-11-22 NOTE — PROGRESS NOTES
Spoke with patient mother regarding recent INR results .  Patient questioned and verified correct dosage .     Patient has missed all of his medications for the past two weeks while he out out of town - per pt mother .  He restarted coumadin last night 1.5 mg 11/21/24 and will be picking up all other meds as of today 11/22/24.     Reported having a lot of personal issues and changes going on but - no other  recent changes .

## 2024-11-22 NOTE — PROGRESS NOTES
Date of Implant with Heartmate 3 LVAD: 6/29/22    PATIENT ARRIVED IN CLINIC:  Ambulatory/wheelchair/walker  Accompanied by: mother  Complaints/reason for visit today: routine    Vitals  Temperature, oral:   Temp Readings from Last 1 Encounters:   11/21/24 97.8 °F (36.6 °C) (Oral)     Blood Pressure:   BP Readings from Last 3 Encounters:   11/21/24 (!) 82/0   11/21/24 (!) 100/0   09/29/24 (!) 88/0        VAD Interrogation:      11/21/2024     4:40 PM 11/21/2024     2:44 PM 9/29/2024     3:20 PM   TXP JOY INTERROGATIONS   Type  HeartMate3    Flow  4.1    Speed  5100    PI  5.9    Power (Serna)  33.6    LSL  4700    Pulsatility No Pulse No Pulse Intermittent pulse     HCT:   Lab Results   Component Value Date    HCT 28.8 (L) 11/21/2024    HCT 27 (L) 09/25/2024       VAD Assessment  Problems / Issues /Alarms with VAD if any: None noted  VAD Sounds: HM3 Smooth  VAD Binder With Patient: No  Reviewed VAD Numbers In Binder: No  Emergency Equipment With Patient: Yes   Equipment Needs: Yes   It is medically necessary to ensure patient has properly functioning equipment and wearables to prevent infection, injury or death to patient.     DLES Assessment and Dressing Care:  Appearance of DLES: 2 with small amount of serosanguineous drainage. Patient states it has not been bleeding, thinks he pulled the driveline during his sleep    Antibiotics: YES duricef 500 BID  Velour: No  Manual & Visual Inspection Of Driveline: No kinks or tears noted  Stabilization Device In Use: yes, melendez securement device    Heartmate 3 Module Cable:  No yellow exposed and Attempted to unscrew modular cable to ensure it will be able to come lose in the event we ever need to change the modular cable while patient held the driveline in place so it would not move. Modular cable connection able to be unscrewed and re-tightened. Instructed pt to perform this weekly.  Frequency of Dressing Changes:  daily  & daily kit   Pt In Need Of Management Kits?:yes -    6 Box of daily kit  It is medically necessary to have VAD management kits in order to prevent infection or to assist in the healing of an infected DLES.    Patient MyChart Questionnaire:        No data to display                 Assessment/ Quality of Life Survery:   Complaints Of Nausea / Vomiting: None noted    Appearance and Frequency Of Stools: normal and formed without blood and diarrhea & daily  Color Of Urine: clear/yellow  Pain: YES; fell a week ago, was trying to get out of the way for a car to park, tripped, hit his chin and right hip. Patient does have a hard knot/hematoma on his right hip. He is able to walk and put pressure on the joint. No lacerations or hematoma on his chin. Patient did not go to the ER; no imaging needed today per Dr. Moya.  Coping/Depression/Anxiety: coping okay, angry, anxious, depressed, and a lot of emotions as he is now living with his mother instead of Robyn. His 2 children are with him and his mother. Ms. Westfall, patient's mom, is trying to obtain custody of children and get them in school. Robyn is also in retirement at this time.   Sleep Habits: 7-8 hrs /night  Sleep Aids: None noted  Showering: Yes, reminded to change dressing immediately after drying off  Self- Care I have no problems with self- care  Mobility I have no problems walking about  Usual Activities I have some problems with performing my usual activities  Activity/Exercise: walking   Driving: No.  Additional Comments: n/a    Labs:    Chemistry        Component Value Date/Time     (L) 11/21/2024 1344    K 2.8 (L) 11/21/2024 1344    CL 95 11/21/2024 1344    CO2 26 11/21/2024 1344    BUN 10 11/21/2024 1344    CREATININE 1.2 11/21/2024 1344     (H) 11/21/2024 1344        Component Value Date/Time    CALCIUM 8.1 (L) 11/21/2024 1344    ALKPHOS 217 (H) 11/21/2024 1344    AST 35 11/21/2024 1344    ALT 18 11/21/2024 1344    BILITOT 1.3 (H) 11/21/2024 1344    ESTGFRAFRICA >60.0 07/27/2022 1229    EGFRNONAA 54.2 (A)  07/27/2022 1229            Magnesium   Date Value Ref Range Status   11/21/2024 1.7 1.6 - 2.6 mg/dL Final       Lab Results   Component Value Date    WBC 7.71 11/21/2024    HGB 8.3 (L) 11/21/2024    HCT 28.8 (L) 11/21/2024    MCV 68 (L) 11/21/2024     11/21/2024       Lab Results   Component Value Date    INR 3.2 (H) 11/21/2024    INR 2.5 10/21/2024    INR 3.3 10/16/2024       BNP   Date Value Ref Range Status   11/21/2024 570 (H) 0 - 99 pg/mL Final     Comment:     Values of less than 100 pg/ml are consistent with non-CHF populations.   09/27/2024 385 (H) 0 - 99 pg/mL Final     Comment:     Values of less than 100 pg/ml are consistent with non-CHF populations.   09/25/2024 289 (H) 0 - 99 pg/mL Final     Comment:     Values of less than 100 pg/ml are consistent with non-CHF populations.       LD   Date Value Ref Range Status   11/21/2024 308 (H) 110 - 260 U/L Final     Comment:     Results are increased in hemolyzed samples.   09/29/2024 235 110 - 260 U/L Final     Comment:     Results are increased in hemolyzed samples.   09/28/2024 235 110 - 260 U/L Final     Comment:     Results are increased in hemolyzed samples.       Labs reviewed with patient: YES     Medication Reconciliation: per MA.  New Medication Detail Provided: yes  Coumadin Managed by: [facility]    Education: Reviewed driveline care, emergency procedures, how to change the controller, alarms with patient, as well as discussed how to page the VAD coordinator in case of an emergency.   Educated patient/family that chest compressions are allowed in the event they are needed.    Reminded patient/caregiver not to touch their face and to cover their mouth when they cough or sneeze.   Vaccines: Pt informed that we are encouraging all VAD patients to receive  vaccines and boosters. Informed pt that they can take tylenol but should avoid other NSAIDs.       Plans/Needs: Routine f/u. Provided patient with a bag of daily kits to take home  and ordered 6 boxes as unsure if patient will f/u in 6 months. Mom asked about insulin; advised to call/see PCP. Patient with fluid over load today, Dr. Moya changed Lasix to Torsemide 60mg BID. Also increased Eplerenone to 50mg once daily. Will need labs next week; [facility], will schedule with next PT/INR.     RTC in 6 months with FLP    Hurricane Season: No

## 2024-11-22 NOTE — PROCEDURES
11/21/2024     4:40 PM 11/21/2024     2:44 PM 9/29/2024     3:20 PM 9/29/2024    11:11 AM 9/29/2024     8:00 AM 9/29/2024     4:06 AM 9/28/2024    11:05 PM   TXP JOY INTERROGATIONS   Type  HeartMate3        Flow  4.1        Speed  5100        PI  5.9        Power (Serna)  33.6        LSL  4700        Pulsatility No Pulse No Pulse Intermittent pulse Intermittent pulse Intermittent pulse Intermittent pulse Intermittent pulse   }

## 2024-12-05 ENCOUNTER — TELEPHONE (OUTPATIENT)
Dept: PALLIATIVE MEDICINE | Facility: CLINIC | Age: 39
End: 2024-12-05
Payer: MEDICAID

## 2024-12-05 ENCOUNTER — PATIENT MESSAGE (OUTPATIENT)
Dept: PALLIATIVE MEDICINE | Facility: CLINIC | Age: 39
End: 2024-12-05
Payer: MEDICAID

## 2024-12-08 ENCOUNTER — HOSPITAL ENCOUNTER (EMERGENCY)
Facility: HOSPITAL | Age: 39
Discharge: HOME | End: 2024-12-09
Attending: EMERGENCY MEDICINE
Payer: MEDICAID

## 2024-12-08 DIAGNOSIS — E11.65 TYPE 2 DIABETES MELLITUS WITH HYPERGLYCEMIA, WITH LONG-TERM CURRENT USE OF INSULIN: ICD-10-CM

## 2024-12-08 DIAGNOSIS — R06.02 SHORTNESS OF BREATH: ICD-10-CM

## 2024-12-08 DIAGNOSIS — Z79.4 TYPE 2 DIABETES MELLITUS WITH HYPERGLYCEMIA, WITH LONG-TERM CURRENT USE OF INSULIN: ICD-10-CM

## 2024-12-08 DIAGNOSIS — R73.9 HYPERGLYCEMIA: ICD-10-CM

## 2024-12-08 DIAGNOSIS — E87.70 HYPERVOLEMIA, UNSPECIFIED HYPERVOLEMIA TYPE: Primary | ICD-10-CM

## 2024-12-08 DIAGNOSIS — E11.65 TYPE 2 DIABETES MELLITUS WITH HYPERGLYCEMIA, UNSPECIFIED WHETHER LONG TERM INSULIN USE: ICD-10-CM

## 2024-12-08 PROCEDURE — 80053 COMPREHEN METABOLIC PANEL: CPT | Performed by: EMERGENCY MEDICINE

## 2024-12-08 PROCEDURE — 83880 ASSAY OF NATRIURETIC PEPTIDE: CPT | Performed by: EMERGENCY MEDICINE

## 2024-12-08 PROCEDURE — 84484 ASSAY OF TROPONIN QUANT: CPT | Performed by: EMERGENCY MEDICINE

## 2024-12-08 PROCEDURE — 99285 EMERGENCY DEPT VISIT HI MDM: CPT | Mod: 25

## 2024-12-08 PROCEDURE — 93005 ELECTROCARDIOGRAM TRACING: CPT

## 2024-12-08 PROCEDURE — 85610 PROTHROMBIN TIME: CPT | Performed by: EMERGENCY MEDICINE

## 2024-12-08 PROCEDURE — 93010 ELECTROCARDIOGRAM REPORT: CPT | Mod: ,,, | Performed by: STUDENT IN AN ORGANIZED HEALTH CARE EDUCATION/TRAINING PROGRAM

## 2024-12-08 PROCEDURE — 85025 COMPLETE CBC W/AUTO DIFF WBC: CPT | Performed by: EMERGENCY MEDICINE

## 2024-12-08 PROCEDURE — 85730 THROMBOPLASTIN TIME PARTIAL: CPT | Performed by: EMERGENCY MEDICINE

## 2024-12-09 ENCOUNTER — TELEPHONE (OUTPATIENT)
Dept: TRANSPLANT | Facility: CLINIC | Age: 39
End: 2024-12-09
Payer: MEDICAID

## 2024-12-09 VITALS — HEART RATE: 93 BPM | RESPIRATION RATE: 17 BRPM | TEMPERATURE: 98 F | OXYGEN SATURATION: 97 %

## 2024-12-09 LAB
ALBUMIN SERPL-MCNC: 2.4 G/DL (ref 3.5–5)
ALBUMIN/GLOB SERPL: 0.4 RATIO (ref 1.1–2)
ALP SERPL-CCNC: 204 UNIT/L (ref 40–150)
ALT SERPL-CCNC: 11 UNIT/L (ref 0–55)
ANION GAP SERPL CALC-SCNC: 12 MEQ/L
ANISOCYTOSIS BLD QL SMEAR: ABNORMAL
APTT PPP: 34.2 SECONDS (ref 23.2–33.7)
AST SERPL-CCNC: 26 UNIT/L (ref 5–34)
BACTERIA #/AREA URNS AUTO: ABNORMAL /HPF
BASOPHILS # BLD AUTO: 0.03 X10(3)/MCL
BASOPHILS NFR BLD AUTO: 0.4 %
BILIRUB SERPL-MCNC: 1.8 MG/DL
BILIRUB UR QL STRIP.AUTO: NEGATIVE
BNP BLD-MCNC: 1017.1 PG/ML
BUN SERPL-MCNC: 9.9 MG/DL (ref 8.9–20.6)
CALCIUM SERPL-MCNC: 7.7 MG/DL (ref 8.4–10.2)
CHLORIDE SERPL-SCNC: 92 MMOL/L (ref 98–107)
CLARITY UR: CLEAR
CO2 SERPL-SCNC: 23 MMOL/L (ref 22–29)
COLOR UR AUTO: ABNORMAL
CREAT SERPL-MCNC: 1.68 MG/DL (ref 0.72–1.25)
CREAT/UREA NIT SERPL: 6
EOSINOPHIL # BLD AUTO: 0.03 X10(3)/MCL (ref 0–0.9)
EOSINOPHIL NFR BLD AUTO: 0.4 %
ERYTHROCYTE [DISTWIDTH] IN BLOOD BY AUTOMATED COUNT: 24.2 % (ref 11.5–17)
GFR SERPLBLD CREATININE-BSD FMLA CKD-EPI: 53 ML/MIN/1.73/M2
GLOBULIN SER-MCNC: 5.5 GM/DL (ref 2.4–3.5)
GLUCOSE SERPL-MCNC: 612 MG/DL (ref 74–100)
GLUCOSE UR QL STRIP: ABNORMAL
HCT VFR BLD AUTO: 25.4 % (ref 42–52)
HGB BLD-MCNC: 7.4 G/DL (ref 14–18)
HGB UR QL STRIP: NEGATIVE
HYPOCHROMIA BLD QL SMEAR: ABNORMAL
IMM GRANULOCYTES # BLD AUTO: 0.02 X10(3)/MCL (ref 0–0.04)
IMM GRANULOCYTES NFR BLD AUTO: 0.3 %
INR PPP: 1.7
KETONES UR QL STRIP: NEGATIVE
LEUKOCYTE ESTERASE UR QL STRIP: NEGATIVE
LYMPHOCYTES # BLD AUTO: 0.76 X10(3)/MCL (ref 0.6–4.6)
LYMPHOCYTES NFR BLD AUTO: 11 %
MACROCYTES BLD QL SMEAR: ABNORMAL
MCH RBC QN AUTO: 19 PG (ref 27–31)
MCHC RBC AUTO-ENTMCNC: 29.1 G/DL (ref 33–36)
MCV RBC AUTO: 65.3 FL (ref 80–94)
MICROCYTES BLD QL SMEAR: ABNORMAL
MONOCYTES # BLD AUTO: 0.75 X10(3)/MCL (ref 0.1–1.3)
MONOCYTES NFR BLD AUTO: 10.9 %
NEUTROPHILS # BLD AUTO: 5.29 X10(3)/MCL (ref 2.1–9.2)
NEUTROPHILS NFR BLD AUTO: 77 %
NITRITE UR QL STRIP: NEGATIVE
NRBC BLD AUTO-RTO: 0.3 %
OHS QRS DURATION: 246 MS
OHS QTC CALCULATION: 934 MS
PH UR STRIP: 6.5 [PH]
PLATELET # BLD AUTO: 341 X10(3)/MCL (ref 130–400)
PLATELET # BLD EST: NORMAL 10*3/UL
PLATELETS.RETICULATED NFR BLD AUTO: 3.3 % (ref 0.9–11.2)
PMV BLD AUTO: 9.5 FL (ref 7.4–10.4)
POCT GLUCOSE: 401 MG/DL (ref 70–110)
POCT GLUCOSE: >500 MG/DL (ref 70–110)
POCT GLUCOSE: >500 MG/DL (ref 70–110)
POIKILOCYTOSIS BLD QL SMEAR: ABNORMAL
POTASSIUM SERPL-SCNC: 3.5 MMOL/L (ref 3.5–5.1)
PROT SERPL-MCNC: 7.9 GM/DL (ref 6.4–8.3)
PROT UR QL STRIP: ABNORMAL
PROTHROMBIN TIME: 20 SECONDS (ref 12.5–14.5)
RBC # BLD AUTO: 3.89 X10(6)/MCL (ref 4.7–6.1)
RBC #/AREA URNS AUTO: ABNORMAL /HPF
RBC MORPH BLD: ABNORMAL
SODIUM SERPL-SCNC: 127 MMOL/L (ref 136–145)
SP GR UR STRIP.AUTO: 1.02 (ref 1–1.03)
SQUAMOUS #/AREA URNS LPF: ABNORMAL /HPF
TARGETS BLD QL SMEAR: ABNORMAL
TROPONIN I SERPL-MCNC: 0.02 NG/ML (ref 0–0.04)
UROBILINOGEN UR STRIP-ACNC: 2
WBC # BLD AUTO: 6.88 X10(3)/MCL (ref 4.5–11.5)
WBC #/AREA URNS AUTO: ABNORMAL /HPF

## 2024-12-09 PROCEDURE — 81001 URINALYSIS AUTO W/SCOPE: CPT | Performed by: EMERGENCY MEDICINE

## 2024-12-09 PROCEDURE — 63600175 PHARM REV CODE 636 W HCPCS: Performed by: EMERGENCY MEDICINE

## 2024-12-09 PROCEDURE — 82962 GLUCOSE BLOOD TEST: CPT

## 2024-12-09 PROCEDURE — 96372 THER/PROPH/DIAG INJ SC/IM: CPT | Performed by: EMERGENCY MEDICINE

## 2024-12-09 PROCEDURE — 96374 THER/PROPH/DIAG INJ IV PUSH: CPT

## 2024-12-09 RX ORDER — FUROSEMIDE 10 MG/ML
80 INJECTION INTRAMUSCULAR; INTRAVENOUS
Status: COMPLETED | OUTPATIENT
Start: 2024-12-09 | End: 2024-12-09

## 2024-12-09 RX ADMIN — HUMAN INSULIN 10 UNITS: 100 INJECTION, SOLUTION SUBCUTANEOUS at 02:12

## 2024-12-09 RX ADMIN — HUMAN INSULIN 10 UNITS: 100 INJECTION, SOLUTION SUBCUTANEOUS at 12:12

## 2024-12-09 RX ADMIN — FUROSEMIDE 80 MG: 10 INJECTION, SOLUTION INTRAMUSCULAR; INTRAVENOUS at 12:12

## 2024-12-09 NOTE — TELEPHONE ENCOUNTER
Patient went to local ER for fluid overload. Coincidentally was found to have BG >600. Reported to ER MD that he was not taking long acting and ran out of short acting insulin but was not in DKA at this time. MD stated patient did not want him to contact us as he did not want to be transferred. Informed MD that due to the VAD he would need to be transferred for admission. MD was in agreement and stated he would speak to the patient regarding that. No further pages. On call MD Villatoro notified.

## 2024-12-09 NOTE — ED PROVIDER NOTES
Encounter Date: 12/8/2024    SCRIBE #1 NOTE: I, Afia Johnson, am scribing for, and in the presence of,  Prasad Banda MD. I have scribed the following portions of the note - Other sections scribed: HPI, ROS, PE.       History     Chief Complaint   Patient presents with    Ascites     Ascites x 2 wks w worsening sob - lvad pt      39 year old male with history of cardiomyopathy, CHF, DM, and LVAD presents to the ED with complaints of abdominal distention and SOB. Pt notes that he is on 80 mg of Lasix BID and 1 mg of Bumex. His mother at bedside notes that he does not take his Bumex because it gives him a rash. Pt denies leg swelling. He notes that the SOB worsens when lying flat.     The history is provided by the patient and a parent. No  was used.     Review of patient's allergies indicates:  No Active Allergies  Past Medical History:   Diagnosis Date    Acute respiratory failure with hypoxia 07/22/2023    Arthritis     Awareness alteration, transient 09/01/2022    Cardiomyopathy     CHF (congestive heart failure) 10/01/2020    COVID-19 06/03/2023    Diabetes mellitus     Dilated cardiomyopathy 10/26/2020    Drug abuse 10/2020    Headache 04/19/2023    Hyperglycemia 12/16/2022    Hyperosmolar hyperglycemic state (HHS) 05/25/2022    ICD (implantable cardioverter-defibrillator) in place 10/26/2020    Infected defibrillator now s/p removal Nov 2023 07/23/2023    Left ventricular assist device (LVAD) complication, initial encounter 06/05/2023    Muscle cramping 06/15/2022    Renal disorder     SOB (shortness of breath) 06/13/2022    Tingling in extremities 07/13/2022     Past Surgical History:   Procedure Laterality Date    APPLICATION OF WOUND VACUUM-ASSISTED CLOSURE DEVICE N/A 6/30/2022    Procedure: APPLICATION, WOUND VAC;  Surgeon: Luis F Paige MD;  Location: [facility] OR [address] [place] [address];  Service: Cardiovascular;  Laterality: N/A;  50 x 5 cm    CARDIAC DEFIBRILLATOR PLACEMENT      COLONOSCOPY N/A  9/25/2024    Procedure: COLONOSCOPY;  Surgeon: Drake Barton MD;  Location: [facility] ENDO (2ND FLR);  Service: Endoscopy;  Laterality: N/A;    ECHOCARDIOGRAM,TRANSESOPHAGEAL  11/9/2023    Procedure: Transesophageal echo (GUILLERMO) intra-procedure log documentation;  Surgeon: Eron Ivy MD;  Location: [facility] EP LAB;  Service: Cardiology;;    ESOPHAGOGASTRODUODENOSCOPY N/A 9/25/2024    Procedure: EGD (ESOPHAGOGASTRODUODENOSCOPY);  Surgeon: Drake Barton MD;  Location: [facility] ENDO (2ND FLR);  Service: Endoscopy;  Laterality: N/A;    EXTRACTION, ELECTRODE LEAD Left 11/9/2023    Procedure: EXTRACTION, ELECTRODE LEAD;  Surgeon: ADALID Leon MD;  Location: [facility] EP LAB;  Service: Cardiology;  Laterality: Left;  INFECTION, LEAD EXTRACTION, GUILLERMO, BSCI, ANES, EH,   *NO CTS BACKUP*    IMPLANTATION OF RIGHT VENTRICULAR ASSIST DEVICE (RVAD) N/A 6/29/2022    Procedure: INSERTION, RVAD;  Surgeon: Luis F Paige MD;  [facility];  Service: Cardiovascular;  Laterality: N/A;    INSERTION OF GRAFT TO PERICARDIUM Right 6/30/2022    Procedure: INSERTION-RIGHT VENTRICULAR ASSIST DEVICE;  Surgeon: Luis F Paige MD;  Location: [facility] OR 2ND FLR;  Service: Cardiovascular;  Laterality: Right;    IRRIGATION OF MEDIASTINUM  6/30/2022    Procedure: IRRIGATION, MEDIASTINUM;  Surgeon: Luis F Paige MD;  [facility];  Service: Cardiovascular;;    LEFT VENTRICULAR ASSIST DEVICE Left 6/23/2022    Procedure: INSERTION-LEFT VENTRICULAR ASSIST DEVICE;  Surgeon: Luis F Paige MD;  [facility];  Service: Cardiovascular;  Laterality: Left;    LEFT VENTRICULAR ASSIST DEVICE N/A 6/29/2022    Procedure: INSERTION-LEFT VENTRICULAR ASSIST DEVICE;  Surgeon: Luis F Paige MD;  Location: [facility] OR 2ND FLR;  Service: Cardiovascular;  Laterality: N/A;    REVISION OF SKIN POCKET FOR CARDIOVERTER-DEFIBRILLATOR  11/9/2023    Procedure: REVISION, SKIN POCKET, FOR CARDIOVERTER-DEFIBRILLATOR;  Surgeon: ADALID Leon  MD Denise;  Location: [facility] EP LAB;  Service: Cardiology;;    RIGHT HEART CATHETERIZATION Right 4/8/2022    Procedure: INSERTION, CATHETER, RIGHT HEART;  Surgeon: Luca Lopez Jr., MD;  Location: [facility] CATH LAB;  Service: Cardiology;  Laterality: Right;    RIGHT HEART CATHETERIZATION Right 4/19/2022    Procedure: INSERTION, CATHETER, RIGHT HEART;  Surgeon: Josh Pulido MD;  Location: [facility] CATH LAB;  Service: Cardiology;  Laterality: Right;    RIGHT HEART CATHETERIZATION Right 7/21/2022    Procedure: INSERTION, CATHETER, RIGHT HEART;  Surgeon: Dalia Crum MD;  Location: [facility] CATH LAB;  Service: Cardiology;  Laterality: Right;    RIGHT HEART CATHETERIZATION Right 10/31/2022    Procedure: INSERTION, CATHETER, RIGHT HEART;  Surgeon: Dalia Crum MD;  Location: [facility] CATH LAB;  Service: Cardiology;  Laterality: Right;    STERNAL WOUND CLOSURE N/A 6/30/2022    Procedure: CLOSURE, WOUND, STERNUM;  Surgeon: Luis F Paige MD;  Location: [facility] OR [facility] FLR;  Service: Cardiovascular;  Laterality: N/A;     Family History   Problem Relation Name Age of Onset    Heart failure Father      Diverticulosis Brother      Heart attack Maternal Grandmother      Heart attack Maternal Grandfather       Social History     Tobacco Use    Smoking status: Former     Current packs/day: 0.00     Average packs/day: 0.5 packs/day for 16.6 years (8.3 ttl pk-yrs)     Types: Cigarettes     Start date: 10/1/2004     Quit date: 4/23/2021     Years since quitting: 3.6    Smokeless tobacco: Never   Substance Use Topics    Alcohol use: Not Currently    Drug use: Not Currently     Types: Marijuana, MDMA (Ecstacy)     Review of Systems   Respiratory:  Positive for shortness of breath.    Cardiovascular:  Negative for leg swelling.   Gastrointestinal:  Positive for abdominal distention.       Physical Exam     Initial Vitals [12/08/24 2211]   BP Pulse Resp Temp SpO2   -- (!) 125 20 98 °F (36.7 °C) 100 %      MAP       --         Physical  Exam    Nursing note and vitals reviewed.  Constitutional: He appears well-developed. No distress.   HENT:   Head: Normocephalic and atraumatic. Mouth/Throat: Oropharynx is clear and moist.   Eyes: Conjunctivae and EOM are normal. Pupils are equal, round, and reactive to light.   Neck: Neck supple.   Cardiovascular:  Normal rate and regular rhythm.           No murmur heard.  Pulmonary/Chest: Breath sounds normal. No respiratory distress. He exhibits no tenderness.   Abdominal: Abdomen is soft. Bowel sounds are normal. He exhibits distension. There is no abdominal tenderness.   Musculoskeletal:         General: Normal range of motion.      Cervical back: Neck supple.      Lumbar back: Normal. No tenderness. Normal range of motion.     Neurological: He is alert and oriented to person, place, and time. He has normal strength. No cranial nerve deficit or sensory deficit.   Psychiatric: He has a normal mood and affect. Judgment normal.         ED Course   Procedures  Labs Reviewed   COMPREHENSIVE METABOLIC PANEL - Abnormal       Result Value    Sodium 127 (*)     Potassium 3.5      Chloride 92 (*)     CO2 23      Glucose 612 (*)     Blood Urea Nitrogen 9.9      Creatinine 1.68 (*)     Calcium 7.7 (*)     Protein Total 7.9      Albumin 2.4 (*)     Globulin 5.5 (*)     Albumin/Globulin Ratio 0.4 (*)     Bilirubin Total 1.8 (*)      (*)     ALT 11      AST 26      eGFR 53      Anion Gap 12.0      BUN/Creatinine Ratio 6     URINALYSIS, REFLEX TO URINE CULTURE - Abnormal    Color, UA Light-Yellow      Appearance, UA Clear      Specific Gravity, UA 1.018      pH, UA 6.5      Protein, UA Trace (*)     Glucose, UA 4+ (*)     Ketones, UA Negative      Blood, UA Negative      Bilirubin, UA Negative      Urobilinogen, UA 2.0 (*)     Nitrites, UA Negative      Leukocyte Esterase, UA Negative      RBC, UA 0-5      WBC, UA 0-5      Bacteria, UA None Seen      Squamous Epithelial Cells, UA None Seen     PROTIME-INR - Abnormal     PT 20.0 (*)     INR 1.7 (*)    APTT - Abnormal    PTT 34.2 (*)    B-TYPE NATRIURETIC PEPTIDE - Abnormal    Natriuretic Peptide 1,017.1 (*)    CBC WITH DIFFERENTIAL - Abnormal    WBC 6.88      RBC 3.89 (*)     Hgb 7.4 (*)     Hct 25.4 (*)     MCV 65.3 (*)     MCH 19.0 (*)     MCHC 29.1 (*)     RDW 24.2 (*)     Platelet 341      MPV 9.5      Neut % 77.0      Lymph % 11.0      Mono % 10.9      Eos % 0.4      Basophil % 0.4      Lymph # 0.76      Neut # 5.29      Mono # 0.75      Eos # 0.03      Baso # 0.03      IG# 0.02      IG% 0.3      NRBC% 0.3      IPF 3.3     BLOOD SMEAR MICROSCOPIC EXAM (OLG) - Abnormal    RBC Morph Abnormal (*)     Anisocytosis 2+ (*)     Hypochromasia 2+ (*)     Macrocytosis 2+ (*)     Microcytosis 1+ (*)     Poikilocytosis 1+ (*)     Target Cells 1+ (*)     Platelets Normal     POCT GLUCOSE - Abnormal    POCT Glucose >500 (*)    POCT GLUCOSE - Abnormal    POCT Glucose >500 (*)    POCT GLUCOSE - Abnormal    POCT Glucose 401 (*)    TROPONIN I - Normal    Troponin-I 0.023     CBC W/ AUTO DIFFERENTIAL    Narrative:     The following orders were created for panel order CBC auto differential.  Procedure                               Abnormality         Status                     ---------                               -----------         ------                     CBC with Differential[[id number]]       Abnormal            Final result                 Please view results for these tests on the individual orders.   POCT GLUCOSE MONITORING CONTINUOUS          Imaging Results              X-Ray Chest 1 View (In process)                      Medications   furosemide injection 80 mg (80 mg Intravenous Given 12/9/24 0044)   insulin regular injection 10 Units 0.1 mL (10 Units Subcutaneous Given 12/9/24 0044)   insulin regular injection 10 Units 0.1 mL (10 Units Subcutaneous Given 12/9/24 0219)     Medical Decision Making  Differential diagnosis includes but is not limited to hypoalbuminemia, CHF, renal  failure     Amount and/or Complexity of Data Reviewed  Independent Historian: parent     Details: His mother at bedside notes that he does not take his Bumex because it gives him a rash.  Labs: ordered.  Radiology: ordered.              Attending Attestation:           Physician Attestation for Scribe:  Physician Attestation Statement for Scribe #1: I, Prasad Banda MD, reviewed documentation, as scribed by Afia Johnson in my presence, and it is both accurate and complete.             ED Course as of 12/09/24 0317   Mon Dec 09, 2024   0039 Lvad coordinator paged   [NL]   0040 Pt blood glucose over 600, pt admits is noncompliant with his insulin and is out of his long acting insulin [NL]   0053 Cally with LVAD coordinator - advises xfer [NL]   0132 [facility] called back could not get xplant card on phone, will call back [NL]   0214 Dr. Eubanks with cardiac transplant in [facility] in [place] says that if the patient is feeling better in his blood sugar is under control after Lasix and insulin he can be discharged from here he does not need to be transferred. [NL]   0315 Pt uop >1000, feels better, cbg improved 400 now, pt wants to go home, Dr. Irving BROWN with dispo home if cbg improving and pt feeling better, I did refill insulin glargine [NL]      ED Course User Index  [NL] Prasad Banda MD                           Clinical Impression:  Final diagnoses:  [R06.02] Shortness of breath  [E87.70] Hypervolemia, unspecified hypervolemia type (Primary)  [R73.9] Hyperglycemia          ED Disposition Condition    Discharge Stable          ED Prescriptions       Medication Sig Dispense Start Date End Date Auth. Provider    insulin detemir U-100, Levemir, 100 unit/mL (3 mL) SubQ InPn pen Inject 12 Units into the skin 2 (two) times daily. In the morning and before bed. 7.2 mL 12/9/2024 3/9/2025 Prasad Banda MD          Follow-up Information       Follow up With Specialties Details Why Contact Info    Marcello  [facility] In 2 days  [address] [facility] [address]  [phone]      Natalya Villatoro MD Cardiology, Transplant In 2 days  [address] [facility] [place] [address]  [phone]               Prasad Banda MD  12/09/24 3087

## 2024-12-12 ENCOUNTER — PATIENT MESSAGE (OUTPATIENT)
Dept: INFECTIOUS DISEASES | Facility: CLINIC | Age: 39
End: 2024-12-12
Payer: MEDICAID

## 2024-12-24 RX ORDER — SPIRONOLACTONE 25 MG/1
25 TABLET ORAL
Qty: 30 TABLET | OUTPATIENT
Start: 2024-12-24

## 2024-12-27 ENCOUNTER — LAB VISIT (OUTPATIENT)
Dept: LAB | Facility: HOSPITAL | Age: 39
End: 2024-12-27
Attending: INTERNAL MEDICINE
Payer: MEDICAID

## 2024-12-27 ENCOUNTER — ANTI-COAG VISIT (OUTPATIENT)
Dept: CARDIOLOGY | Facility: CLINIC | Age: 39
End: 2024-12-27
Payer: MEDICAID

## 2024-12-27 DIAGNOSIS — Z95.811 LVAD (LEFT VENTRICULAR ASSIST DEVICE) PRESENT: ICD-10-CM

## 2024-12-27 DIAGNOSIS — Z95.811 LVAD (LEFT VENTRICULAR ASSIST DEVICE) PRESENT: Primary | ICD-10-CM

## 2024-12-27 LAB
INR PPP: 1.8
PROTHROMBIN TIME: 20.2 SECONDS (ref 12.5–14.5)

## 2024-12-27 PROCEDURE — 85610 PROTHROMBIN TIME: CPT

## 2024-12-27 PROCEDURE — 36415 COLL VENOUS BLD VENIPUNCTURE: CPT

## 2024-12-27 NOTE — PROGRESS NOTES
Malou reports correct Warfarin dose, stated VAD advised pt to go to ER today for edema, Malou stated pt declined and said he will be admitted at [facility] on 12/31/24 for edema, sob, abdominal edema(Malou stated abdomen is large), drinks 2 Ensures a day, ate 2 jars of pickles last week, no other changes reported.

## 2024-12-27 NOTE — PROGRESS NOTES
[facility]    2024 3:40 PM    Assessment/Plan:    Patient presents today with subtherapeutic  INR.    Assessment of patient findings and chart review: Reports significant edema - was advised by VAD to go to ER but patient declined    Recommendation for patient's warfarin regimen: Boost dose today to 3mg then resume current maintenance dose    Recommend repeat INR Monday  _________________________________________________________________    Kevan Queen (39 y.o.) is followed by the [facility].    Anticoagulation Summary  As of 2024      INR goal:  2.0-3.0   TTR:  49.7% (1.7 y)   INR used for dosin.8 (2024)   Warfarin maintenance plan:  1.5 mg (3 mg x 0.5) every day   Weekly warfarin total:  10.5 mg   Plan last modified:  Yolanda Lee, PharmD (10/1/2024)   Next INR check:  2024   Target end date:  --    Indications    LVAD (left ventricular assist device) present [Z95.811]                 Anticoagulation Episode Summary       INR check location:  --    Preferred lab:  --    Send INR reminders to:  [facility] COUMADIN LVAD    Comments:  LVAD //  [facility] P:[phone] fax [phone] ;[facility] Drawstation (may/may not received results same day) // [facility]  Lab (Ph) [phone] (Fx) [phone] //          Anticoagulation Care Providers       Provider Role Specialty Phone number    Josh Ryan MD [facility] Cardiology [phone]

## 2024-12-27 NOTE — ASSESSMENT & PLAN NOTE
NIDCM , on home Milrinone 0.25 mcg/kg/min, S/P ICD, h/o polysubstance abuse, tox screen 4/8 -ve, admitted with ADHF and for consideration for advanced options w/u:    -RHC on Milrinone 0.25 mcg on 4/8: RA 15, PA 59/35 (43), W 33 and CO/CI 3.27/1.47  -TTE on Milrinone 0.25 mcg on 4/8: LVEDD 7, LVEF 10%, grade 3 diastolic dysfunction, RV severely dilated, mod to severely depressed, mod-severe MR, mild TR, PAS > 50 and IVC 8  - Admit labs showed sCr 1.7 (baseline of ~ 1.6)  - Increased Milrinone to 0.375 mcg/kg/min on admit. Milrinone then increased to 0.5 mcg/kg/min overnight 2/2 drop in sVO2. sVO2 has since improved to 57 with CO/CI 5.05/2.23 and SVR 1124  - Lasix resumed at 20 mg/hr overnight with decrease in CVP to 8 and increase in UOP. Net -ve 2.4L past 24 hours and sCr has trended down to 1.4  - Serial labs, keep K+ > 4.0 and Mg+ > 2.0  - GDMT; Hold Toprol given decompensation. Continue Entresto and Aldactone  - Step 2 of VAD only pathway despite favorable blood type (A) (still smokes)     Physical Therapy    Visit Type: treatment  SUBJECTIVE  RN Ruth Ann aware of therapy session. Patient seen on 9LM. Patient agreeable to ambulation, c/o general fatigue. Hoping to get home in time for her great granddaughters birthday at the beginning of January.  Patient / Family Goal: return to previous functional status, maximize function and return home     OBJECTIVE          Bed Mobility  - Supine to sit: independent  - Sit to supine: independent  Transfers  Assistive devices: gait belt, 2-wheeled walker  - Sit to stand: supervision  - Stand to sit: supervision  Supervision for safety. No assistance needed    Ambulation / Gait  - Assistive device: gait belt and 2-wheeled walker  - Distance (feet unless otherwise indicated): 140  - Assist Level: supervision  - Surface: even    Supervision for safety. No loss of balance or buckling. Patient with c/o fatigue, noted decreased pace  Stair Ambulation  Patient declined stair trial this date    Interventions     Training provided: activity tolerance, balance retraining, bed mobility training, body mechanics, functional ambulation, energy conservation, compensatory techniques, gait training, neuromuscular reeducation, transfer training, stair training and safety training    Skilled input: Verbal instruction/cues, tactile instruction/cues, posture correction and facilitation  Verbal Consent: Writer verbally educated and received verbal consent for hand placement, positioning of patient, and techniques to be performed today from patient for clothing adjustments for techniques, hand placement and palpation for techniques and therapist position for techniques as described above and how they are pertinent to the patient's plan of care.         Education:   - Present and ready to learn: patient  Education provided during session:  - Results of above outlined education: Needs reinforcement    ASSESSMENT   Progress: progressing toward goals  Interfering components: cognitive  deficits, lines/equipment, decreased activity tolerance, medical precautions and surgical precautions    Discharge needs based on today's assessment:   - Current level of function: slightly below baseline level of function   - Therapy needs at discharge: therapy 1-3 times per week   - Activities of daily living (ADLs) requiring support at discharge: bed mobility, transfers, ambulation and stairs   - Impairments that require further therapy intervention: activity tolerance, balance, pain, strength, safety awareness and coordination    AM-PAC  - Generalized Prior Level of Function: IND/MOD I ([facility] 22-24)       Key: MOD A=moderate assistance, IND/MOD I=independent/modified independent  - Generalized Current Level of Function     - Current Mobility Score: 21       AM-PAC Scoring Key= >21 Modified Independent; 20-21 Supervision; 18-19 Minimal assist; 13-17 Moderate assist; 9-12 Max assist; <9 Total assist      PLAN (while hospitalized)  Suggestions for next session as indicated: Increase ambulation, stairs    PT Frequency: 3-5 x per week         Agreement to plan and goals: patient agrees with goals and treatment plan        GOALS  Review Date: 1/2/2025  Long Term Goals: (to be met by time of discharge from hospital)  Sit to supine: Patient will complete sit to supine modified independent.  Supine to sit: Patient will complete supine to sit modified independent.  Sit to stand: Patient will complete sit to stand transfer with 2-wheeled walker, modified independent.   Ambulation (even): Patient will ambulate on even surface for 200 feet with 2-wheeled walker, modified independent.   Stairs: Patient will ambulate 7 steps with using one rail, modified independent.   Documented in the chart in the following areas: Assessment/Plan.      Patient at End of Session:   Location: in bed  Safety measures: call light within reach, equipment intact and lines intact  Handoff to: nurse and nurse assistant      Assistant to continue  with current plan of care, current goals are appropriate, patient progressing towards set goals        Therapy procedure time and total treatment time can be found documented on the Time Entry flowsheet

## 2024-12-31 ENCOUNTER — HOSPITAL ENCOUNTER (INPATIENT)
Facility: HOSPITAL | Age: 39
LOS: 8 days | Discharge: HOME | DRG: 314 | End: 2025-01-08
Attending: STUDENT IN AN ORGANIZED HEALTH CARE EDUCATION/TRAINING PROGRAM | Admitting: INTERNAL MEDICINE
Payer: MEDICAID

## 2024-12-31 DIAGNOSIS — E11.65 TYPE 2 DIABETES MELLITUS WITH HYPERGLYCEMIA, WITH LONG-TERM CURRENT USE OF INSULIN: ICD-10-CM

## 2024-12-31 DIAGNOSIS — Z79.4 TYPE 2 DIABETES MELLITUS WITH HYPERGLYCEMIA, WITH LONG-TERM CURRENT USE OF INSULIN: ICD-10-CM

## 2024-12-31 DIAGNOSIS — R18.8 OTHER ASCITES: ICD-10-CM

## 2024-12-31 DIAGNOSIS — Z95.811 LVAD (LEFT VENTRICULAR ASSIST DEVICE) PRESENT: ICD-10-CM

## 2024-12-31 DIAGNOSIS — J90 PLEURAL EFFUSION: ICD-10-CM

## 2024-12-31 DIAGNOSIS — R07.9 CHEST PAIN: ICD-10-CM

## 2024-12-31 DIAGNOSIS — E11.65 TYPE 2 DIABETES MELLITUS WITH HYPERGLYCEMIA, UNSPECIFIED WHETHER LONG TERM INSULIN USE: ICD-10-CM

## 2024-12-31 DIAGNOSIS — I50.9 ACUTE DECOMPENSATED HEART FAILURE: ICD-10-CM

## 2024-12-31 DIAGNOSIS — R60.9 PITTING EDEMA: Primary | ICD-10-CM

## 2024-12-31 LAB
ALBUMIN SERPL BCP-MCNC: 2.4 G/DL (ref 3.5–5.2)
ALP SERPL-CCNC: 153 U/L (ref 40–150)
ALT SERPL W/O P-5'-P-CCNC: 7 U/L (ref 10–44)
ANION GAP SERPL CALC-SCNC: 10 MMOL/L (ref 8–16)
AST SERPL-CCNC: 38 U/L (ref 10–40)
BASOPHILS # BLD AUTO: 0.03 K/UL (ref 0–0.2)
BASOPHILS NFR BLD: 0.3 % (ref 0–1.9)
BILIRUB SERPL-MCNC: 1.8 MG/DL (ref 0.1–1)
BNP SERPL-MCNC: 822 PG/ML (ref 0–99)
BUN SERPL-MCNC: 16 MG/DL (ref 6–20)
CALCIUM SERPL-MCNC: 8.2 MG/DL (ref 8.7–10.5)
CHLORIDE SERPL-SCNC: 99 MMOL/L (ref 95–110)
CO2 SERPL-SCNC: 22 MMOL/L (ref 23–29)
CREAT SERPL-MCNC: 1.1 MG/DL (ref 0.5–1.4)
DIFFERENTIAL METHOD BLD: ABNORMAL
EOSINOPHIL # BLD AUTO: 0 K/UL (ref 0–0.5)
EOSINOPHIL NFR BLD: 0.3 % (ref 0–8)
ERYTHROCYTE [DISTWIDTH] IN BLOOD BY AUTOMATED COUNT: 24.3 % (ref 11.5–14.5)
EST. GFR  (NO RACE VARIABLE): >60 ML/MIN/1.73 M^2
ESTIMATED AVG GLUCOSE: 258 MG/DL (ref 68–131)
FERRITIN SERPL-MCNC: 71 NG/ML (ref 20–300)
GLUCOSE SERPL-MCNC: 282 MG/DL (ref 70–110)
HBA1C MFR BLD: 10.6 % (ref 4–5.6)
HCT VFR BLD AUTO: 27.5 % (ref 40–54)
HGB BLD-MCNC: 7.9 G/DL (ref 14–18)
IMM GRANULOCYTES # BLD AUTO: 0.06 K/UL (ref 0–0.04)
IMM GRANULOCYTES NFR BLD AUTO: 0.5 % (ref 0–0.5)
INR PPP: 2 (ref 0.8–1.2)
IRON SERPL-MCNC: 19 UG/DL (ref 45–160)
LYMPHOCYTES # BLD AUTO: 0.8 K/UL (ref 1–4.8)
LYMPHOCYTES NFR BLD: 7.1 % (ref 18–48)
MCH RBC QN AUTO: 18.2 PG (ref 27–31)
MCHC RBC AUTO-ENTMCNC: 28.7 G/DL (ref 32–36)
MCV RBC AUTO: 63 FL (ref 82–98)
MONOCYTES # BLD AUTO: 0.8 K/UL (ref 0.3–1)
MONOCYTES NFR BLD: 7.3 % (ref 4–15)
NEUTROPHILS # BLD AUTO: 9.8 K/UL (ref 1.8–7.7)
NEUTROPHILS NFR BLD: 84.5 % (ref 38–73)
NRBC BLD-RTO: 0 /100 WBC
OHS QRS DURATION: 146 MS
OHS QTC CALCULATION: 623 MS
PLATELET # BLD AUTO: 283 K/UL (ref 150–450)
PMV BLD AUTO: ABNORMAL FL (ref 9.2–12.9)
POCT GLUCOSE: 183 MG/DL (ref 70–110)
POCT GLUCOSE: 311 MG/DL (ref 70–110)
POCT GLUCOSE: 361 MG/DL (ref 70–110)
POTASSIUM SERPL-SCNC: 3.7 MMOL/L (ref 3.5–5.1)
PROT SERPL-MCNC: 8.6 G/DL (ref 6–8.4)
PROTHROMBIN TIME: 20.9 SEC (ref 9–12.5)
RBC # BLD AUTO: 4.35 M/UL (ref 4.6–6.2)
SATURATED IRON: 4 % (ref 20–50)
SODIUM SERPL-SCNC: 131 MMOL/L (ref 136–145)
TOTAL IRON BINDING CAPACITY: 465 UG/DL (ref 250–450)
TRANSFERRIN SERPL-MCNC: 314 MG/DL (ref 200–375)
TROPONIN I SERPL DL<=0.01 NG/ML-MCNC: 10 NG/L (ref 0–35)
TROPONIN I SERPL DL<=0.01 NG/ML-MCNC: 9 NG/L (ref 0–35)
WBC # BLD AUTO: 11.55 K/UL (ref 3.9–12.7)

## 2024-12-31 PROCEDURE — 20600001 HC STEP DOWN PRIVATE ROOM

## 2024-12-31 PROCEDURE — 27000248 HC VAD-ADDITIONAL DAY

## 2024-12-31 PROCEDURE — 85025 COMPLETE CBC W/AUTO DIFF WBC: CPT

## 2024-12-31 PROCEDURE — 97165 OT EVAL LOW COMPLEX 30 MIN: CPT

## 2024-12-31 PROCEDURE — 80053 COMPREHEN METABOLIC PANEL: CPT

## 2024-12-31 PROCEDURE — 96374 THER/PROPH/DIAG INJ IV PUSH: CPT

## 2024-12-31 PROCEDURE — 99285 EMERGENCY DEPT VISIT HI MDM: CPT | Mod: 25

## 2024-12-31 PROCEDURE — 93010 ELECTROCARDIOGRAM REPORT: CPT | Mod: ,,, | Performed by: INTERNAL MEDICINE

## 2024-12-31 PROCEDURE — 83036 HEMOGLOBIN GLYCOSYLATED A1C: CPT

## 2024-12-31 PROCEDURE — 83880 ASSAY OF NATRIURETIC PEPTIDE: CPT

## 2024-12-31 PROCEDURE — 63600175 PHARM REV CODE 636 W HCPCS

## 2024-12-31 PROCEDURE — 85610 PROTHROMBIN TIME: CPT | Performed by: STUDENT IN AN ORGANIZED HEALTH CARE EDUCATION/TRAINING PROGRAM

## 2024-12-31 PROCEDURE — 25000003 PHARM REV CODE 250: Performed by: STUDENT IN AN ORGANIZED HEALTH CARE EDUCATION/TRAINING PROGRAM

## 2024-12-31 PROCEDURE — 82728 ASSAY OF FERRITIN: CPT

## 2024-12-31 PROCEDURE — 97161 PT EVAL LOW COMPLEX 20 MIN: CPT

## 2024-12-31 PROCEDURE — 83540 ASSAY OF IRON: CPT

## 2024-12-31 PROCEDURE — 99222 1ST HOSP IP/OBS MODERATE 55: CPT | Mod: ,,,

## 2024-12-31 PROCEDURE — 21400001 HC TELEMETRY ROOM

## 2024-12-31 PROCEDURE — 84484 ASSAY OF TROPONIN QUANT: CPT

## 2024-12-31 PROCEDURE — 63600175 PHARM REV CODE 636 W HCPCS: Performed by: STUDENT IN AN ORGANIZED HEALTH CARE EDUCATION/TRAINING PROGRAM

## 2024-12-31 PROCEDURE — 99223 1ST HOSP IP/OBS HIGH 75: CPT | Mod: ,,, | Performed by: INTERNAL MEDICINE

## 2024-12-31 PROCEDURE — 93005 ELECTROCARDIOGRAM TRACING: CPT

## 2024-12-31 PROCEDURE — 93750 INTERROGATION VAD IN PERSON: CPT | Mod: ,,, | Performed by: INTERNAL MEDICINE

## 2024-12-31 PROCEDURE — 25500020 PHARM REV CODE 255: Performed by: STUDENT IN AN ORGANIZED HEALTH CARE EDUCATION/TRAINING PROGRAM

## 2024-12-31 PROCEDURE — 25000003 PHARM REV CODE 250: Performed by: INTERNAL MEDICINE

## 2024-12-31 RX ORDER — DULOXETIN HYDROCHLORIDE 30 MG/1
30 CAPSULE, DELAYED RELEASE ORAL DAILY
Status: DISCONTINUED | OUTPATIENT
Start: 2024-12-31 | End: 2025-01-09 | Stop reason: HOSPADM

## 2024-12-31 RX ORDER — INSULIN ASPART 100 [IU]/ML
0-10 INJECTION, SOLUTION INTRAVENOUS; SUBCUTANEOUS
Status: DISCONTINUED | OUTPATIENT
Start: 2024-12-31 | End: 2025-01-09 | Stop reason: HOSPADM

## 2024-12-31 RX ORDER — ATORVASTATIN CALCIUM 40 MG/1
40 TABLET, FILM COATED ORAL DAILY
Status: DISCONTINUED | OUTPATIENT
Start: 2024-12-31 | End: 2025-01-09 | Stop reason: HOSPADM

## 2024-12-31 RX ORDER — GLUCAGON 1 MG
1 KIT INJECTION
Status: DISCONTINUED | OUTPATIENT
Start: 2024-12-31 | End: 2025-01-09 | Stop reason: HOSPADM

## 2024-12-31 RX ORDER — EPLERENONE 25 MG/1
50 TABLET, FILM COATED ORAL DAILY
Status: DISCONTINUED | OUTPATIENT
Start: 2024-12-31 | End: 2025-01-09 | Stop reason: HOSPADM

## 2024-12-31 RX ORDER — IBUPROFEN 200 MG
16 TABLET ORAL
Status: DISCONTINUED | OUTPATIENT
Start: 2024-12-31 | End: 2024-12-31

## 2024-12-31 RX ORDER — GABAPENTIN 100 MG/1
100 CAPSULE ORAL DAILY
Status: DISCONTINUED | OUTPATIENT
Start: 2024-12-31 | End: 2025-01-09 | Stop reason: HOSPADM

## 2024-12-31 RX ORDER — AMLODIPINE BESYLATE 5 MG/1
5 TABLET ORAL DAILY
Status: DISCONTINUED | OUTPATIENT
Start: 2024-12-31 | End: 2025-01-04

## 2024-12-31 RX ORDER — IBUPROFEN 200 MG
16 TABLET ORAL
Status: DISCONTINUED | OUTPATIENT
Start: 2024-12-31 | End: 2025-01-09 | Stop reason: HOSPADM

## 2024-12-31 RX ORDER — AMIODARONE HYDROCHLORIDE 200 MG/1
200 TABLET ORAL DAILY
Status: DISCONTINUED | OUTPATIENT
Start: 2024-12-31 | End: 2025-01-09 | Stop reason: HOSPADM

## 2024-12-31 RX ORDER — CEFADROXIL 500 MG/1
500 CAPSULE ORAL EVERY 12 HOURS
Status: DISCONTINUED | OUTPATIENT
Start: 2024-12-31 | End: 2025-01-09 | Stop reason: HOSPADM

## 2024-12-31 RX ORDER — INSULIN ASPART 100 [IU]/ML
4 INJECTION, SOLUTION INTRAVENOUS; SUBCUTANEOUS
Status: DISCONTINUED | OUTPATIENT
Start: 2024-12-31 | End: 2024-12-31

## 2024-12-31 RX ORDER — INSULIN GLARGINE 100 [IU]/ML
10 INJECTION, SOLUTION SUBCUTANEOUS 2 TIMES DAILY
Status: DISCONTINUED | OUTPATIENT
Start: 2024-12-31 | End: 2024-12-31

## 2024-12-31 RX ORDER — IBUPROFEN 200 MG
24 TABLET ORAL
Status: DISCONTINUED | OUTPATIENT
Start: 2024-12-31 | End: 2025-01-09 | Stop reason: HOSPADM

## 2024-12-31 RX ORDER — LEVETIRACETAM 500 MG/1
1500 TABLET ORAL 2 TIMES DAILY
Status: DISCONTINUED | OUTPATIENT
Start: 2024-12-31 | End: 2025-01-09 | Stop reason: HOSPADM

## 2024-12-31 RX ORDER — POTASSIUM CHLORIDE 20 MEQ/1
40 TABLET, EXTENDED RELEASE ORAL 2 TIMES DAILY
Status: DISCONTINUED | OUTPATIENT
Start: 2024-12-31 | End: 2025-01-09 | Stop reason: HOSPADM

## 2024-12-31 RX ORDER — ACETAMINOPHEN 325 MG/1
650 TABLET ORAL EVERY 6 HOURS PRN
Status: DISCONTINUED | OUTPATIENT
Start: 2024-12-31 | End: 2025-01-09 | Stop reason: HOSPADM

## 2024-12-31 RX ORDER — ASPIRIN 81 MG/1
81 TABLET ORAL DAILY
Status: DISCONTINUED | OUTPATIENT
Start: 2024-12-31 | End: 2025-01-09 | Stop reason: HOSPADM

## 2024-12-31 RX ORDER — INSULIN ASPART 100 [IU]/ML
6 INJECTION, SOLUTION INTRAVENOUS; SUBCUTANEOUS
Status: DISCONTINUED | OUTPATIENT
Start: 2024-12-31 | End: 2025-01-02

## 2024-12-31 RX ORDER — GLUCAGON 1 MG
1 KIT INJECTION
Status: DISCONTINUED | OUTPATIENT
Start: 2024-12-31 | End: 2024-12-31

## 2024-12-31 RX ORDER — GABAPENTIN 400 MG/1
400 CAPSULE ORAL NIGHTLY
Status: DISCONTINUED | OUTPATIENT
Start: 2024-12-31 | End: 2025-01-09 | Stop reason: HOSPADM

## 2024-12-31 RX ORDER — INSULIN GLARGINE 100 [IU]/ML
20 INJECTION, SOLUTION SUBCUTANEOUS DAILY
Status: DISCONTINUED | OUTPATIENT
Start: 2025-01-01 | End: 2025-01-01

## 2024-12-31 RX ORDER — FUROSEMIDE 10 MG/ML
80 INJECTION INTRAMUSCULAR; INTRAVENOUS 3 TIMES DAILY
Status: DISCONTINUED | OUTPATIENT
Start: 2024-12-31 | End: 2025-01-01

## 2024-12-31 RX ORDER — FUROSEMIDE 10 MG/ML
60 INJECTION INTRAMUSCULAR; INTRAVENOUS
Status: COMPLETED | OUTPATIENT
Start: 2024-12-31 | End: 2024-12-31

## 2024-12-31 RX ORDER — IBUPROFEN 200 MG
24 TABLET ORAL
Status: DISCONTINUED | OUTPATIENT
Start: 2024-12-31 | End: 2024-12-31

## 2024-12-31 RX ORDER — INSULIN ASPART 100 [IU]/ML
0-10 INJECTION, SOLUTION INTRAVENOUS; SUBCUTANEOUS
Status: DISCONTINUED | OUTPATIENT
Start: 2024-12-31 | End: 2024-12-31

## 2024-12-31 RX ADMIN — GABAPENTIN 400 MG: 400 CAPSULE ORAL at 08:12

## 2024-12-31 RX ADMIN — CEFADROXIL 500 MG: 500 CAPSULE ORAL at 08:12

## 2024-12-31 RX ADMIN — LEVETIRACETAM 1500 MG: 500 TABLET, FILM COATED ORAL at 08:12

## 2024-12-31 RX ADMIN — INSULIN ASPART 4 UNITS: 100 INJECTION, SOLUTION INTRAVENOUS; SUBCUTANEOUS at 08:12

## 2024-12-31 RX ADMIN — WARFARIN SODIUM 1.5 MG: 5 TABLET ORAL at 06:12

## 2024-12-31 RX ADMIN — INSULIN GLARGINE 10 UNITS: 100 INJECTION, SOLUTION SUBCUTANEOUS at 08:12

## 2024-12-31 RX ADMIN — ASPIRIN 81 MG: 81 TABLET, COATED ORAL at 09:12

## 2024-12-31 RX ADMIN — IOHEXOL 75 ML: 350 INJECTION, SOLUTION INTRAVENOUS at 06:12

## 2024-12-31 RX ADMIN — FUROSEMIDE 80 MG: 10 INJECTION, SOLUTION INTRAVENOUS at 08:12

## 2024-12-31 RX ADMIN — FUROSEMIDE 80 MG: 10 INJECTION, SOLUTION INTRAVENOUS at 02:12

## 2024-12-31 RX ADMIN — POTASSIUM CHLORIDE 40 MEQ: 1500 TABLET, EXTENDED RELEASE ORAL at 08:12

## 2024-12-31 RX ADMIN — GABAPENTIN 100 MG: 100 CAPSULE ORAL at 08:12

## 2024-12-31 RX ADMIN — DULOXETINE HYDROCHLORIDE 30 MG: 30 CAPSULE, DELAYED RELEASE ORAL at 08:12

## 2024-12-31 RX ADMIN — AMIODARONE HYDROCHLORIDE 200 MG: 200 TABLET ORAL at 08:12

## 2024-12-31 RX ADMIN — ACETAMINOPHEN 650 MG: 325 TABLET ORAL at 08:12

## 2024-12-31 RX ADMIN — ATORVASTATIN CALCIUM 40 MG: 40 TABLET, FILM COATED ORAL at 08:12

## 2024-12-31 RX ADMIN — FUROSEMIDE 60 MG: 10 INJECTION, SOLUTION INTRAMUSCULAR; INTRAVENOUS at 05:12

## 2024-12-31 RX ADMIN — EPLERENONE 50 MG: 25 TABLET, FILM COATED ORAL at 08:12

## 2024-12-31 RX ADMIN — AMLODIPINE BESYLATE 5 MG: 5 TABLET ORAL at 08:12

## 2024-12-31 RX ADMIN — INSULIN ASPART 2 UNITS: 100 INJECTION, SOLUTION INTRAVENOUS; SUBCUTANEOUS at 06:12

## 2024-12-31 RX ADMIN — INSULIN ASPART 6 UNITS: 100 INJECTION, SOLUTION INTRAVENOUS; SUBCUTANEOUS at 06:12

## 2024-12-31 NOTE — HPI
Kevan Queen is a 39 year old male with stage D 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 He underwent RVAD removal and chest closure 6/30/2022. He is off inotropes. He presents with one month hx of dyspnea and abdominal distention. Stated he takes lasix 80mg daily. He develops rash to both torsemide and bumex. He reports yesterday he noticed bilateral LE edema which prompted him to come to the ED with the assistance of his family as they drove from [place]. He reports intermittent chest pain with coughing but not occurring at the moment. He denies any LVAD alarms. He reports eating salty foods yesterday.      On 11/15/23 underwent removal of eroded [employer] scICD--no Lifevest (due to LVAD) and no plan to replace ICD as not requiring therapy post LVAD with concern for reinfection.      Of note, he presented to [facility] ER  on 12/8 for dyspnea and hyperglycemia, felt better so was discharged.     Upon arrival to the ER today, he was hemodynamically stable. Labs demonstrated (hyponatremia Na 131), asiya Ca 9.5, Hgb 7.9 (at baseline), . He was given lasix IV 60mg and made about 300cc of UOP. He is admitted to HTS team for further management.

## 2024-12-31 NOTE — CONSULTS
"[facility] - Emergency Dept  Endocrinology  Diabetes Consult Note    Consult Requested by: Griffin Horta MD   Reason for admit: Acute decompensated heart failure    HISTORY OF PRESENT ILLNESS:  Reason for Consult: Management of T2DM, Hyperglycemia     Surgical Procedure and Date: LVAD 06/29/2022      Diabetes diagnosis year: 2022    Lab Results   Component Value Date    HGBA1C 10.3 (H) 08/25/2024       Home Diabetes Medications:  Levemir 20 units twice daily and Novolog 10 or 18 units with meals per patient    How often checking glucose at home? 3-4x day   BG readings on regimen: 150-200s  Hypoglycemia on the regimen?  Yes, at least once weekly; experiences blurred vision  Missed doses on regimen?  Yes, he has been out of Levemir "for a while" since it has been discontinued    Diabetes Complications include:   Hyperglycemia     Complicating diabetes co morbidities:   History of MI, CHF, and CKD      HPI:   Patient is a 39 y.o. 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 He underwent RVAD removal and chest closure 6/30/2022. He presented with one month hx of dyspnea and abdominal distention. He reported bilateral LE edema which prompted him to come to the ED with the assistance of his family as they drove from [place]. He reported intermittent chest pain with coughing. Endo consulted for BG management.            Interval HPI:   No acute events overnight. Patient in room ED 16/16. Blood glucose worsening. BG above goal on current insulin regimen (N/A). Steroid use- None.    Renal function- Normal   Lab Results   Component Value Date    CREATININE 1.1 12/31/2024     Vasopressors-  None     Endocrine will continue to follow and manage insulin orders " inpatient.     Diet diabetic Cardiac (Low Na/Chol), Low Sodium,2gm; 2000 Calories (up to 75 gm per meal); Fluid - 1500mL     Eatin%  Nausea: No  Hypoglycemia and intervention: No  Fever: No  TPN and/or TF: No  If yes, type of TF/TPN and rate: N/A    PMH, PSH, FH, SH updated and reviewed     ROS:  Review of Systems   Constitutional:  Negative for chills, fatigue, fever and unexpected weight change.   Eyes:  Negative for visual disturbance.   Respiratory:  Positive for shortness of breath.    Cardiovascular:  Positive for chest pain (with coughing).   Gastrointestinal:  Positive for abdominal pain and constipation. Negative for diarrhea, nausea and vomiting.   Endocrine: Positive for polydipsia. Negative for polyphagia and polyuria.   Musculoskeletal:         Bilateral lower extremity swelling   Neurological:  Negative for dizziness, weakness, numbness and headaches.       Current Medications and/or Treatments Impacting Glycemic Control  Immunotherapy:    Immunosuppressants       None          Steroids:   Hormones (From admission, onward)      None          Pressors:    Autonomic Drugs (From admission, onward)      None          Hyperglycemia/Diabetes Medications:   Antihyperglycemics (From admission, onward)      Start     Stop Route Frequency Ordered    24 0900  insulin glargine U-100 (Lantus) pen 10 Units         -- SubQ 2 times daily 24 0538    24 0715  insulin aspart U-100 pen 4 Units         -- SubQ 3 times daily with meals 24 0538    24 0638  insulin aspart U-100 pen 0-10 Units         -- SubQ Before meals & nightly PRN 24 0538             PHYSICAL EXAMINATION:  Vitals:    24 0700   BP: (!) 76/0   Pulse: 78   Resp: 17   Temp: 98 °F (36.7 °C)     Body mass index is 21.88 kg/m².     Physical Exam  Constitutional:       Appearance: Normal appearance.   HENT:      Head: Normocephalic and atraumatic.      Nose: Nose normal.   Eyes:      Conjunctiva/sclera:  "Conjunctivae normal.   Cardiovascular:      Rate and Rhythm: Normal rate.   Pulmonary:      Effort: Pulmonary effort is normal. No respiratory distress.   Abdominal:      General: There is no distension.   Musculoskeletal:      Right lower leg: Edema present.      Left lower leg: Edema present.   Neurological:      Mental Status: He is alert.   Psychiatric:         Mood and Affect: Mood normal.         Behavior: Behavior normal.         Thought Content: Thought content normal.         Judgment: Judgment normal.            Labs Reviewed and Include   Recent Labs   Lab 12/31/24  0339   *   CALCIUM 8.2*   ALBUMIN 2.4*   PROT 8.6*   *   K 3.7   CO2 22*   CL 99   BUN 16   CREATININE 1.1   ALKPHOS 153*   ALT 7*   AST 38   BILITOT 1.8*     Lab Results   Component Value Date    WBC 11.55 12/31/2024    HGB 7.9 (L) 12/31/2024    HCT 27.5 (L) 12/31/2024    MCV 63 (L) 12/31/2024     12/31/2024     No results for input(s): "TSH", "FREET4" in the last 168 hours.  Lab Results   Component Value Date    HGBA1C 10.6 (H) 12/31/2024       Nutritional status:   Body mass index is 21.88 kg/m².  Lab Results   Component Value Date    ALBUMIN 2.4 (L) 12/31/2024    ALBUMIN 2.4 (L) 12/08/2024    ALBUMIN 2.6 (L) 11/21/2024     Lab Results   Component Value Date    PREALBUMIN 12 (L) 11/21/2024    PREALBUMIN 14 (L) 09/27/2024    PREALBUMIN 12 (L) 09/25/2024       Estimated Creatinine Clearance: 101.3 mL/min (based on SCr of 1.1 mg/dL).    Accu-Checks  Recent Labs     12/31/24  0618   POCTGLUCOSE 361*        ASSESSMENT and PLAN    Cardiac/Vascular  * Acute decompensated heart failure  Managed by primary team  Optimize blood glucose control        HTN (hypertension)  Uncontrolled HTN can worsen insulin resistance.         LVAD (left ventricular assist device) present  Managed by primary team      Endocrine  Type 2 diabetes mellitus with hyperglycemia, with long-term current use of insulin  Endocrinology consulted for BG " management.   BG goal 140-180    WBD at 0.5 u/kg/day  - Lantus (Insulin Glargine) 20 units daily  - Novolog (Insulin Aspart) 6 units TIDWM and prn for BG excursions [facility] SSI (150/25)  - BG checks AC/HS  - Hypoglycemia protocol in place    ** Please notify Endocrine for any change and/or advance in diet**  ** Please call Endocrine for any BG related issues **    Discharge Planning:   TBD. Please notify endocrinology prior to discharge.        Plan discussed with patient at bedside.     Guera De Oliveira PA-C  Endocrinology  [facility] - Emergency Dept

## 2024-12-31 NOTE — ED TRIAGE NOTES
Kevan Queen, a 39 y.o. male presents to the ED w/ complaint of     Triage note:  Chief Complaint   Patient presents with    Abdominal Pain    Shortness of Breath     Pt has c/o abd pain, swelling x 1 month. +SOB. Denies N/V/D. LVAD pt. Coordinator Meghann weber.     Chest Pain     Pt also endorses chest pain and productive cough (sputum clear in color)     Review of patient's allergies indicates:   Allergen Reactions    Bumex [bumetanide] Hives     Past Medical History:   Diagnosis Date    Acute respiratory failure with hypoxia 07/22/2023    Arthritis     Awareness alteration, transient 09/01/2022    Cardiomyopathy     CHF (congestive heart failure) 10/01/2020    COVID-19 06/03/2023    Diabetes mellitus     Dilated cardiomyopathy 10/26/2020    Drug abuse 10/2020    Headache 04/19/2023    Hyperglycemia 12/16/2022    Hyperosmolar hyperglycemic state (HHS) 05/25/2022    ICD (implantable cardioverter-defibrillator) in place 10/26/2020    Infected defibrillator now s/p removal Nov 2023 07/23/2023    Left ventricular assist device (LVAD) complication, initial encounter 06/05/2023    Muscle cramping 06/15/2022    Renal disorder     SOB (shortness of breath) 06/13/2022    Tingling in extremities 07/13/2022

## 2024-12-31 NOTE — PLAN OF CARE
PT Evaluation completed and PT POC established.    Problem: Physical Therapy  Goal: Physical Therapy Goal  Description: Goals to be met by: 2025     Patient will increase functional independence with mobility by performin. Supine to sit with Modified [place]  2. Sit to supine with Modified [place]  3. Sit to stand transfer with Supervision  4. Bed to chair transfer with Supervision using LRAD  5. Gait  x 300 feet with Supervision using LRAD.   6. Ascend/descend 15 stairs with right Handrails Supervision using LRAD.     Outcome: Progressing

## 2024-12-31 NOTE — PT/OT/SLP EVAL
Occupational Therapy  Co-Evaluation    Name: Kevan Queen  MRN: [id number]  Admitting Diagnosis: Acute decompensated heart failure  Recent Surgery: * No surgery found *      Recommendations:     Discharge Recommendations: Low Intensity Therapy  Discharge Equipment Recommendations:  none  Barriers to discharge:  Inaccessible home environment    Assessment:     Kevan Queen is a 39 y.o. male with a medical diagnosis of Acute decompensated heart failure.  He presents with the following performance deficits affecting function: weakness, impaired endurance, impaired self care skills, impaired functional mobility, impaired cardiopulmonary response to activity.      Pt agreeable to session, motivated to participate, and tolerated well. Pt presenting with c/o SOB throughout session and diminished functional endurance as compared to his baseline. Pt would benefit from skilled OT services in the acute care setting to improve activity tolerance, increase participation in self-care routines, and to facilitate a return to PLOF and least restrictive home environment. Patient currently demonstrates a need for low intensity therapy on a scheduled basis secondary to a decline in functional status due to illness.    Rehab Prognosis: Good; patient would benefit from acute skilled OT services to address these deficits and reach maximum level of function.       Plan:     Patient to be seen 3 x/week to address the above listed problems via self-care/home management, therapeutic activities, therapeutic exercises, neuromuscular re-education  Plan of Care Expires: 01/28/25  Plan of Care Reviewed with: patient    Subjective     Chief Complaint: SOB  Patient/Family Comments/goals: be admitted to a room, get out of ED    Occupational Profile:  Living Environment: Pt lives with his mother and brothers in a 2SH with 2STE and ~15 steps to reach second floor (RHR when ascending). Pt's bedroom and bathroom are located on second  floor.   Previous level of function: IND with ADLs, mother provides some assist for bathing  Roles and Routines: Pt does not currently drive.   Equipment Used at Home: other (see comments), shower chair (owns a RW but does not use)  Assistance upon Discharge: Pt will have assistance from mother and brothers as needed upon discharge.     Pain/Comfort:  Pain Rating 1: 0/10    Patients cultural, spiritual, Taoist conflicts given the current situation: no    Objective:     Communicated with: Nursing prior to session.  Patient found sitting edge of bed with telemetry, LVAD upon OT entry to room.    General Precautions: Standard, fall, seizure, diabetic, LVAD  Orthopedic Precautions: N/A  Braces: N/A  Respiratory Status: Room air    Occupational Performance:    Bed Mobility:    Not observed secondary to pt seated EOB upon entry/exit    Functional Mobility/Transfers:  Patient completed Sit <> Stand Transfer with supervision  with  no assistive device   Functional Mobility: Pt able to tolerate ambulating throughout room to simulate household/community distances ~24 ft with no AD and supervision. No LOB noted. Pt c/o mild SOB (4/10) throughout session.     Activities of Daily Living:  Pt with no ADL needs at this time. Pt presenting to session dressed in personal pants, socks, and slides. Pt declined donning hospital socks this date - ambulated in personal socks and slides this date.     Cognitive/Visual Perceptual:  Cognitive/Psychosocial Skills:     -       Oriented to: Person, Place, Time, and Situation   -       Follows Commands/attention:Follows multistep  commands  -       Communication: clear/fluent  -       Memory: No Deficits noted  -       Safety awareness/insight to disability: intact   -       Mood/Affect/Coping skills/emotional control: Appropriate to situation and Cooperative  Visual/Perceptual:      - Pt stating he experiences blurred vision within his central visual field when his diabetes worsens.       Physical Exam:  Sensation:    -       Intact  light/touch BUE  Upper Extremity Range of Motion:     -       Right Upper Extremity: WNL  -       Left Upper Extremity: WNL  Upper Extremity Strength:    -       Right Upper Extremity: WNL  -       Left Upper Extremity: WNL   Strength:    -       Right Upper Extremity: WNL  -       Left Upper Extremity: WNL  Fine Motor Coordination:    -       Intact  Left hand thumb/finger opposition skills and Right hand thumb/finger opposition skills  Gross motor coordination:   WFL    AMPAC 6 Click ADL:  AMPAC Total Score: 21    Treatment & Education:  Provided education on the role of OT, POC, and therapy goals while in the acute care setting.   Provided education on the importance of continued mobilization and participation in OOB activities to increase functional endurance and activity tolerance for increased participation in ADL routines.   Provided education on safe transfer techniques and proper hand placement to promote safety awareness and prevent falls with functional transfers.   All questions/concerns within the scope of OT answered/addressed - pt verbalized understanding.   Instructed pt to call for assistance when wanting to ambulate or participate in OOB activities to promote safety and prevent falls.   White board updated.    Co-evaluation performed to appropriately and safely assess patient's strength, endurance, functional mobility, and ADL performance while facilitating functional tasks in addition to accommodating for patient's activity tolerance and medical acuity.    Patient left sitting edge of bed with all lines intact, call button in reach, and nursing notified    GOALS:   Multidisciplinary Problems       Occupational Therapy Goals          Problem: Occupational Therapy    Goal Priority Disciplines Outcome Interventions   Occupational Therapy Goal     OT, PT/OT Progressing    Description: Goals to be met by: 1/28/25     Patient will increase functional  independence with ADLs by performing:    UE Dressing with [place].  LE Dressing with [place].  Grooming while standing at sink with [place].  Toileting from toilet with [place] for hygiene and clothing management.   Toilet transfer to toilet with [place].                         History:     Past Medical History:   Diagnosis Date    Acute respiratory failure with hypoxia 07/22/2023    Arthritis     Awareness alteration, transient 09/01/2022    Cardiomyopathy     CHF (congestive heart failure) 10/01/2020    COVID-19 06/03/2023    Diabetes mellitus     Dilated cardiomyopathy 10/26/2020    Drug abuse 10/2020    Headache 04/19/2023    Hyperglycemia 12/16/2022    Hyperosmolar hyperglycemic state (HHS) 05/25/2022    ICD (implantable cardioverter-defibrillator) in place 10/26/2020    Infected defibrillator now s/p removal Nov 2023 07/23/2023    Left ventricular assist device (LVAD) complication, initial encounter 06/05/2023    Muscle cramping 06/15/2022    Renal disorder     SOB (shortness of breath) 06/13/2022    Tingling in extremities 07/13/2022         Past Surgical History:   Procedure Laterality Date    APPLICATION OF WOUND VACUUM-ASSISTED CLOSURE DEVICE N/A 6/30/2022    Procedure: APPLICATION, WOUND VAC;  Surgeon: Luis F Paige MD;  Location: [facility] OR [address] [place] [address];  Service: Cardiovascular;  Laterality: N/A;  50 x 5 cm    CARDIAC DEFIBRILLATOR PLACEMENT      COLONOSCOPY N/A 9/25/2024    Procedure: COLONOSCOPY;  Surgeon: Drake Barton MD;  Location: [facility] ([address] [place] [address]);  Service: Endoscopy;  Laterality: N/A;    ECHOCARDIOGRAM,TRANSESOPHAGEAL  11/9/2023    Procedure: Transesophageal echo (GUILLERMO) intra-procedure log documentation;  Surgeon: Eron Ivy MD;  Location: [facility] EP LAB;  Service: Cardiology;;    ESOPHAGOGASTRODUODENOSCOPY N/A 9/25/2024    Procedure: EGD (ESOPHAGOGASTRODUODENOSCOPY);  Surgeon: Drake Barton MD;  Location: [facility] ENDO ([address] [place] [address]);  Service: Endoscopy;  Laterality:  N/A;    EXTRACTION, ELECTRODE LEAD Left 11/9/2023    Procedure: EXTRACTION, ELECTRODE LEAD;  Surgeon: ADALID Leon MD;  Location: [facility] EP LAB;  Service: Cardiology;  Laterality: Left;  INFECTION, LEAD EXTRACTION, GUILLERMO, BSCI, ANES, EH,   *NO CTS BACKUP*    IMPLANTATION OF RIGHT VENTRICULAR ASSIST DEVICE (RVAD) N/A 6/29/2022    Procedure: INSERTION, RVAD;  Surgeon: Luis F Paige MD;  Location: [facility] OR 2ND FLR;  Service: Cardiovascular;  Laterality: N/A;    INSERTION OF GRAFT TO PERICARDIUM Right 6/30/2022    Procedure: INSERTION-RIGHT VENTRICULAR ASSIST DEVICE;  Surgeon: Luis F Paige MD;  Location: [facility] OR [facility] FLR;  Service: Cardiovascular;  Laterality: Right;    IRRIGATION OF MEDIASTINUM  6/30/2022    Procedure: IRRIGATION, MEDIASTINUM;  Surgeon: Luis F Paige MD;  Location: [facility] OR 2ND FLR;  Service: Cardiovascular;;    LEFT VENTRICULAR ASSIST DEVICE Left 6/23/2022    Procedure: INSERTION-LEFT VENTRICULAR ASSIST DEVICE;  Surgeon: Luis F Paige MD;  Location: [facility] OR 2ND FLR;  Service: Cardiovascular;  Laterality: Left;    LEFT VENTRICULAR ASSIST DEVICE N/A 6/29/2022    Procedure: INSERTION-LEFT VENTRICULAR ASSIST DEVICE;  Surgeon: Luis F Paige MD;  Location: [facility] OR 2ND FLR;  Service: Cardiovascular;  Laterality: N/A;    REVISION OF SKIN POCKET FOR CARDIOVERTER-DEFIBRILLATOR  11/9/2023    Procedure: REVISION, SKIN POCKET, FOR CARDIOVERTER-DEFIBRILLATOR;  Surgeon: ADALID Leon MD;  Location: [facility] EP LAB;  Service: Cardiology;;    RIGHT HEART CATHETERIZATION Right 4/8/2022    Procedure: INSERTION, CATHETER, RIGHT HEART;  Surgeon: Luca Lopez Jr., MD;  Location: [facility] CATH LAB;  Service: Cardiology;  Laterality: Right;    RIGHT HEART CATHETERIZATION Right 4/19/2022    Procedure: INSERTION, CATHETER, RIGHT HEART;  Surgeon: Josh Pulido MD;  Location: [facility] CATH LAB;  Service: Cardiology;  Laterality: Right;    RIGHT HEART CATHETERIZATION Right 7/21/2022    Procedure: INSERTION,  CATHETER, RIGHT HEART;  Surgeon: Dalia Crum MD;  Location: [facility] CATH LAB;  Service: Cardiology;  Laterality: Right;    RIGHT HEART CATHETERIZATION Right 10/31/2022    Procedure: INSERTION, CATHETER, RIGHT HEART;  Surgeon: Dalia Crum MD;  Location: [facility] CATH LAB;  Service: Cardiology;  Laterality: Right;    STERNAL WOUND CLOSURE N/A 6/30/2022    Procedure: CLOSURE, WOUND, STERNUM;  Surgeon: Luis F Paige MD;  Location: [facility] OR [address] [place] [address];  Service: Cardiovascular;  Laterality: N/A;       Time Tracking:     OT Date of Treatment: 12/31/24  OT Start Time: 0935  OT Stop Time: 0945  OT Total Time (min): 10 min    Billable Minutes:Evaluation 10 minutes    12/31/2024

## 2024-12-31 NOTE — ED NOTES
Assumed care of the patient. Report received from RIAN Ortega. Pt removed hospital gown, side rails up X2, bed low and locked, and call light is placed within reach. No family/visitors at bedside at this time. Pt denies any complaints or needs.

## 2024-12-31 NOTE — H&P
[facility] - Emergency Dept  Heart Transplant  H&P    Patient Name: Kevan Queen  MRN: [id number]  Admission Date: 12/31/2024  Attending Physician: Griffin Horta MD  Primary Care Provider: Rosio Armendariz FNP  Principal Problem:Acute decompensated heart failure    Subjective:     History of Present Illness:  Kevan Queen is a 39 year old male with stage D 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 He underwent RVAD removal and chest closure 6/30/2022. He is off inotropes. He presents with one month hx of dyspnea and abdominal distention. Stated he takes lasix 80mg daily. He develops rash to both torsemide and bumex. He reports yesterday he noticed bilateral LE edema which prompted him to come to the ED with the assistance of his family as they drove from [place]. He reports intermittent chest pain with coughing but not occurring at the moment. He denies any LVAD alarms. He reports eating salty foods yesterday.      On 11/15/23 underwent removal of eroded [employer] scICD--no Lifevest (due to LVAD) and no plan to replace ICD as not requiring therapy post LVAD with concern for reinfection.      Of note, he presented to [facility] ER  on 12/8 for dyspnea and hyperglycemia, felt better so was discharged.     Upon arrival to the ER today, he was hemodynamically stable. Labs demonstrated (hyponatremia Na 131), asiya Ca 9.5, Hgb 7.9 (at baseline), . He was given lasix IV 60mg and made about 300cc of UOP. He is admitted to [facility] team for further management.          Past Medical History:   Diagnosis Date    Acute respiratory failure with hypoxia 07/22/2023    Arthritis     Awareness alteration, transient 09/01/2022    Cardiomyopathy     CHF  (congestive heart failure) 10/01/2020    COVID-19 06/03/2023    Diabetes mellitus     Dilated cardiomyopathy 10/26/2020    Drug abuse 10/2020    Headache 04/19/2023    Hyperglycemia 12/16/2022    Hyperosmolar hyperglycemic state (HHS) 05/25/2022    ICD (implantable cardioverter-defibrillator) in place 10/26/2020    Infected defibrillator now s/p removal Nov 2023 07/23/2023    Left ventricular assist device (LVAD) complication, initial encounter 06/05/2023    Muscle cramping 06/15/2022    Renal disorder     SOB (shortness of breath) 06/13/2022    Tingling in extremities 07/13/2022       Past Surgical History:   Procedure Laterality Date    APPLICATION OF WOUND VACUUM-ASSISTED CLOSURE DEVICE N/A 6/30/2022    Procedure: APPLICATION, WOUND VAC;  Surgeon: Luis F Paige MD;  [facility];  Service: Cardiovascular;  Laterality: N/A;  50 x 5 cm    CARDIAC DEFIBRILLATOR PLACEMENT      COLONOSCOPY N/A 9/25/2024    Procedure: COLONOSCOPY;  Surgeon: Drake Barton MD;  Location: [facility] (2ND FLR);  Service: Endoscopy;  Laterality: N/A;    ECHOCARDIOGRAM,TRANSESOPHAGEAL  11/9/2023    Procedure: Transesophageal echo (GUILLERMO) intra-procedure log documentation;  Surgeon: Eron Ivy MD;  Location: [facility] EP LAB;  Service: Cardiology;;    ESOPHAGOGASTRODUODENOSCOPY N/A 9/25/2024    Procedure: EGD (ESOPHAGOGASTRODUODENOSCOPY);  Surgeon: Drkae Barton MD;  Location: [facility] ENDO (2ND FLR);  Service: Endoscopy;  Laterality: N/A;    EXTRACTION, ELECTRODE LEAD Left 11/9/2023    Procedure: EXTRACTION, ELECTRODE LEAD;  Surgeon: ADALID Leon MD;  Location: [facility] EP LAB;  Service: Cardiology;  Laterality: Left;  INFECTION, LEAD EXTRACTION, GUILLERMO, BSCI, ANES, EH,   *NO CTS BACKUP*    IMPLANTATION OF RIGHT VENTRICULAR ASSIST DEVICE (RVAD) N/A 6/29/2022    Procedure: INSERTION, RVAD;  Surgeon: Luis F Paige MD;  Location: [facility] OR [facility] FLR;  Service: Cardiovascular;  Laterality: N/A;    INSERTION OF GRAFT TO  PERICARDIUM Right 6/30/2022    Procedure: INSERTION-RIGHT VENTRICULAR ASSIST DEVICE;  Surgeon: Luis F Paige MD;  Location: [facility] OR [facility] FLR;  Service: Cardiovascular;  Laterality: Right;    IRRIGATION OF MEDIASTINUM  6/30/2022    Procedure: IRRIGATION, MEDIASTINUM;  Surgeon: Luis F Paige MD;  Location: [facility] OR 2ND FLR;  Service: Cardiovascular;;    LEFT VENTRICULAR ASSIST DEVICE Left 6/23/2022    Procedure: INSERTION-LEFT VENTRICULAR ASSIST DEVICE;  Surgeon: Luis F Paige MD;  Location: [facility] OR [facility] FLR;  Service: Cardiovascular;  Laterality: Left;    LEFT VENTRICULAR ASSIST DEVICE N/A 6/29/2022    Procedure: INSERTION-LEFT VENTRICULAR ASSIST DEVICE;  Surgeon: Luis F Paige MD;  [facility];  Service: Cardiovascular;  Laterality: N/A;    REVISION OF SKIN POCKET FOR CARDIOVERTER-DEFIBRILLATOR  11/9/2023    Procedure: REVISION, SKIN POCKET, FOR CARDIOVERTER-DEFIBRILLATOR;  Surgeon: ADALID Leon MD;  Location: [facility] EP LAB;  Service: Cardiology;;    RIGHT HEART CATHETERIZATION Right 4/8/2022    Procedure: INSERTION, CATHETER, RIGHT HEART;  Surgeon: Luca Lopez Jr., MD;  Location: [facility] CATH LAB;  Service: Cardiology;  Laterality: Right;    RIGHT HEART CATHETERIZATION Right 4/19/2022    Procedure: INSERTION, CATHETER, RIGHT HEART;  Surgeon: Josh Pulido MD;  Location: [facility] CATH LAB;  Service: Cardiology;  Laterality: Right;    RIGHT HEART CATHETERIZATION Right 7/21/2022    Procedure: INSERTION, CATHETER, RIGHT HEART;  Surgeon: Dalia Crum MD;  Location: [facility] CATH LAB;  Service: Cardiology;  Laterality: Right;    RIGHT HEART CATHETERIZATION Right 10/31/2022    Procedure: INSERTION, CATHETER, RIGHT HEART;  Surgeon: Dalia Crum MD;  Location: [facility] CATH LAB;  Service: Cardiology;  Laterality: Right;    STERNAL WOUND CLOSURE N/A 6/30/2022    Procedure: CLOSURE, WOUND, STERNUM;  Surgeon: Luis F Paige MD;  [facility];  Service: Cardiovascular;  Laterality: N/A;        Review of patient's allergies indicates:   Allergen Reactions    Bumex [bumetanide] Hives       Current Facility-Administered Medications   Medication    amiodarone tablet 200 mg    amLODIPine tablet 5 mg    aspirin EC tablet 81 mg    atorvastatin tablet 40 mg    cefadroxil capsule 500 mg    dextrose 50% injection 12.5 g    dextrose 50% injection 25 g    DULoxetine DR capsule 30 mg    eplerenone tablet 50 mg    furosemide injection 80 mg    gabapentin capsule 100 mg    And    gabapentin capsule 400 mg    glucagon (human recombinant) injection 1 mg    glucose chewable tablet 16 g    glucose chewable tablet 24 g    insulin aspart U-100 pen 0-10 Units    insulin aspart U-100 pen 4 Units    insulin glargine U-100 (Lantus) pen 10 Units    levETIRAcetam tablet 1,500 mg    potassium chloride SA CR tablet 40 mEq    warfarin split tablet 1.5 mg     Current Outpatient Medications   Medication Sig    acetaminophen (TYLENOL) 325 MG tablet Take 2 tablets (650 mg total) by mouth every 6 (six) hours as needed for Pain.    amiodarone (PACERONE) 200 MG Tab Take 1 tablet (200 mg total) by mouth once daily.    amLODIPine (NORVASC) 5 MG tablet Take 1 tablet (5 mg total) by mouth once daily.    aspirin (ECOTRIN) 81 MG EC tablet Take 1 tablet (81 mg total) by mouth once daily.    atorvastatin (LIPITOR) 40 MG tablet Take 1 tablet (40 mg total) by mouth once daily.    blood sugar diagnostic Strp Use to test blood glucose 4 (four) times daily.    blood-glucose meter (TRUE METRIX GLUCOSE METER) Misc Use to test blood glucose 4 (four) times daily.    blood-glucose sensor (FREESTYLE LUCIUS 3 SENSOR) Dee 1 Device by Misc.(Non-Drug; Combo Route) route every 14 (fourteen) days.    cefadroxil (DURICEF) 500 MG Cap Take 1 capsule (500 mg total) by mouth every 12 (twelve) hours.    D5W PgBk 50 mL with ceFAZolin 2 gram SolR 6 g Inject 6 g into the vein once daily.    DULoxetine (CYMBALTA) 30 MG capsule Take 1 capsule (30 mg total) by mouth once  "daily.    eplerenone (INSPRA) 25 MG Tab Take 2 tablets (50 mg total) by mouth once daily.    gabapentin (NEURONTIN) 100 MG capsule Take 1 capsule (100 mg total) by mouth 2 (two) times daily AND 4 capsules (400 mg total) every evening.    glucagon (BAQSIMI) 3 mg/actuation Spry 1 each by Nasal route as needed (hypoglycemia).    insulin aspart U-100 (NOVOLOG) 100 unit/mL (3 mL) InPn pen Inject 18 Units into the skin 3 (three) times daily with meals: MAX 94 units/daily  150-200    201-250    251-300    301-350    >350  +2 units   +4 units    +6 units    +8 units    +10 units    insulin detemir U-100, Levemir, 100 unit/mL (3 mL) SubQ InPn pen Inject 12 Units into the skin 2 (two) times daily. In the morning and before bed.    lancets 33 gauge Misc Use to test blood glucose 4 (four) times daily.    levETIRAcetam (KEPPRA) 1000 MG tablet Take 1.5 tablets (1,500 mg total) by mouth 2 (two) times daily.    magnesium oxide (MAG-OX) 400 mg (241.3 mg magnesium) tablet Take 1 tablet (400 mg total) by mouth once daily.    ondansetron (ZOFRAN-ODT) 4 MG TbDL Take 1 tablet (4 mg total) by mouth every 8 (eight) hours as needed (nausea).    pantoprazole (PROTONIX) 40 MG tablet Take 1 tablet (40 mg total) by mouth once daily.    pen needle, diabetic 32 gauge x 5/32" Ndle 1 each by Misc.(Non-Drug; Combo Route) route 4 (four) times daily.    polyethylene glycol (GLYCOLAX) 17 gram PwPk Take 17 g by mouth once daily.    potassium chloride SA (K-DUR,KLOR-CON M) 10 MEQ tablet Take 3 tablets (30 mEq total) by mouth once daily.    senna-docusate 8.6-50 mg (PERICOLACE) 8.6-50 mg per tablet Take 1 tablet by mouth daily as needed for Constipation.    torsemide (DEMADEX) 20 MG Tab Take 3 tablets (60 mg total) by mouth 2 (two) times a day.    warfarin (COUMADIN) 3 MG tablet Take 0.5 tablets (1.5 mg total) by mouth Daily.     Family History       Problem Relation (Age of Onset)    Diverticulosis Brother    Heart attack Maternal Grandmother, Maternal " Grandfather    Heart failure Father          Tobacco Use    Smoking status: Former     Current packs/day: 0.00     Average packs/day: 0.5 packs/day for 16.6 years (8.3 ttl pk-yrs)     Types: Cigarettes     Start date: 10/1/2004     Quit date: 4/23/2021     Years since quitting: 3.6    Smokeless tobacco: Never   Substance and Sexual Activity    Alcohol use: Not Currently    Drug use: Not Currently     Types: Marijuana, MDMA (Ecstacy)    Sexual activity: Yes     Partners: Female     Birth control/protection: None     Review of Systems   Constitutional:  Positive for activity change. Negative for appetite change, chills and fever.   Respiratory:  Positive for shortness of breath.    Cardiovascular:  Positive for leg swelling. Negative for chest pain and palpitations.   Gastrointestinal:  Positive for abdominal distention.   Genitourinary:  Negative for difficulty urinating.   Neurological:  Negative for dizziness and light-headedness.   Psychiatric/Behavioral:  Negative for agitation, behavioral problems and confusion.      Objective:     Vital Signs (Most Recent):  Temp: 98.6 °F (37 °C) (12/31/24 0445)  Pulse: 85 (12/31/24 0445)  Resp: 20 (12/31/24 0445)  BP: (!) 68/0 (12/31/24 0345)  SpO2: 95 % (12/31/24 0445) Vital Signs (24h Range):  Temp:  [98.5 °F (36.9 °C)-98.6 °F (37 °C)] 98.6 °F (37 °C)  Pulse:  [68-95] 85  Resp:  [20] 20  SpO2:  [95 %] 95 %  BP: (68)/(0) 68/0     Patient Vitals for the past 72 hrs (Last 3 readings):   Weight   12/31/24 0218 79.4 kg (175 lb 0.7 oz)     Body mass index is 21.88 kg/m².    No intake or output data in the 24 hours ending 12/31/24 0554       Physical Exam  Constitutional:       General: He is not in acute distress.     Appearance: He is normal weight. He is not ill-appearing, toxic-appearing or diaphoretic.   HENT:      Head: Normocephalic.      Nose: Nose normal.      Mouth/Throat:      Mouth: Mucous membranes are moist.   Eyes:      Extraocular Movements: Extraocular movements  "intact.   Cardiovascular:      Rate and Rhythm: Normal rate and regular rhythm.      Pulses: Normal pulses.      Comments: Smooth LVAD hum.   Pulmonary:      Effort: Pulmonary effort is normal. No respiratory distress.      Breath sounds: Normal breath sounds. No wheezing or rales.   Abdominal:      General: Abdomen is flat. There is distension.      Tenderness: There is no abdominal tenderness.   Musculoskeletal:         General: Swelling (2+ bilateral pitting edema.) present. Normal range of motion.   Skin:     General: Skin is warm.   Neurological:      Mental Status: He is alert and oriented to person, place, and time. Mental status is at baseline.   Psychiatric:         Mood and Affect: Mood normal.         Behavior: Behavior normal.         Thought Content: Thought content normal.            Significant Labs:  CBC:  Recent Labs   Lab 12/31/24  0339   WBC 11.55   RBC 4.35*   HGB 7.9*   HCT 27.5*      MCV 63*   MCH 18.2*   MCHC 28.7*     BNP:  Recent Labs   Lab 12/31/24  0339   *     CMP:  Recent Labs   Lab 12/31/24  0339   *   CALCIUM 8.2*   ALBUMIN 2.4*   PROT 8.6*   *   K 3.7   CO2 22*   CL 99   BUN 16   CREATININE 1.1   ALKPHOS 153*   ALT 7*   AST 38   BILITOT 1.8*      Coagulation:   Recent Labs   Lab 12/27/24  1221   INR 1.8*     LDH:  No results for input(s): "LDH" in the last 72 hours.  Microbiology:  Microbiology Results (last 7 days)       ** No results found for the last 168 hours. **            BMP:   Recent Labs   Lab 12/31/24  0339   *   *   K 3.7   CL 99   CO2 22*   BUN 16   CREATININE 1.1   CALCIUM 8.2*     Cardiac Markers: No results for input(s): "CKMB", "TROPONINT", "MYOGLOBIN" in the last 72 hours.  Coagulation: No results for input(s): "PT", "INR", "APTT" in the last 72 hours.  Prealbumin: No results for input(s): "PREALBUMIN" in the last 72 hours.  I have reviewed all pertinent labs within the past 24 hours.    Diagnostic Results:  I have reviewed all " pertinent imaging results/findings within the past 24 hours.  Imaging Results              X-Ray Chest AP Portable (In process)                       Assessment/Plan:     * Acute decompensated heart failure  Kevan Queen is a 39 year old male with stage D 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 He underwent RVAD removal and chest closure 6/30/2022. He is off RV inotrope support. He presents with decomepsated HF.    Plan  - Continue with IV lasix 80mg TID  - Aggressive potassium repletion  - Low salt diet, 1500cc daily fluid restriction  - Daily weights; strict I/Os    Atrial flutter  Continue home amiodarone and anticoagulation.    Type 2 diabetes mellitus with hyperglycemia, with long-term current use of insulin  A1c 10.   Resume insulin  Endocrinology consulted for BG management    HTN (hypertension)  Resume home antihypertensive - amlodipine and epleronone.    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     LVAD (left ventricular assist device) present  Procedure: Device Interrogation Including analysis of device parameters  Current Settings: Ventricular Assist Device  Review of device function is stable/unstable stable        12/31/2024     3:13 AM 11/21/2024     4:40 PM 11/21/2024     2:44 PM 9/29/2024     3:20 PM 9/29/2024    11:11 AM 9/29/2024     8:00 AM 9/29/2024     4:06 AM   TXP LVAD INTERROGATIONS   Type HeartMate3 HeartMate3  HeartMate3 HeartMate3 HeartMate3 HeartMate3   Flow 51 3.9  3.8 4.2 4.2 4   Speed 51 5100  5100 5100 5150 5100   PI 3.7 6.2  6.6 5.2 5.1 5.9   Power (Serna) 3.6 3.6   3.6 3.5 3.6 3.5   LSL    4700 4700 4700    Pulsatility No Pulse No Pulse No Pulse Intermittent pulse Intermittent pulse Intermittent pulse Intermittent pulse         Tobacco use  Active tobacco user.   Patient declined nicotine patch.    Anemia of chronic disease  Hgb 7.9 at baseline. Will order iron panel.     Anxiety disorder, unspecified  Resume duloxetine.        Eleuterio Genao MD  Heart Transplant  [facility] - Emergency Dept

## 2024-12-31 NOTE — ASSESSMENT & PLAN NOTE
Kevan Queen is a 39 year old male with stage D 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 He underwent RVAD removal and chest closure 6/30/2022. He is off RV inotrope support. He presents with decomepsated HF.    Plan  - Continue with IV lasix 80mg TID  - Aggressive potassium repletion  - Low salt diet, 1500cc daily fluid restriction  - Daily weights; strict I/Os

## 2024-12-31 NOTE — PT/OT/SLP EVAL
"Physical Therapy Co-Evaluation    Patient Name:  Kevan Queen   MRN:  [id number]    Recommendations:     Discharge Recommendations: Low Intensity Therapy   Discharge Equipment Recommendations: none   Barriers to discharge: None    Assessment:     Kevan Queen is a 39 y.o. male admitted with a medical diagnosis of Acute decompensated heart failure.  He presents with the following impairments/functional limitations: impaired endurance, impaired cardiopulmonary response to activity.    Pleasant and in good spirits. Pt patiently waiting to be roomed at foot of bed sitting  upright. Pt amb 24' no AD 1 person for safety and no LOB noted. Pt politely declined stair training today. Pt has 15 stairs R HR to second floor, was I prior to admission however reported increasing fatigue as of recent. Pt will benefit from skilled PT services while in house in order to address the aforementioned deficits.      Rehab Prognosis: Good; patient would benefit from acute skilled PT services to address these deficits and reach maximum level of function.    Recent Surgery: * No surgery found *      Plan:     During this hospitalization, patient to be seen 3 x/week to address the identified rehab impairments via gait training, therapeutic activities, therapeutic exercises, neuromuscular re-education and progress toward the following goals:    Plan of Care Expires:  01/30/24    Subjective     "Im ready to get out"    Pain/Comfort:  Pain Rating 1: 0/10    Patients cultural, spiritual, Oriental orthodox conflicts given the current situation: no    Living Environment:  Per pt: pt, his mother, and brothers reside in a [facility] with 2STE. Pt has about 15 steps R HR to reach second floor where bedroom and tub shower combo are located. patients level of function was I, assist with bathing and dressing.  Equipment used at home: shower chair, walker, rolling.  DME owned (not currently used): rolling walker.  Upon discharge, patient will have " assistance from family.    Objective:     Communicated with RN prior to session.  Patient found sitting at foot of bed with LVAD  upon PT entry to room.    General Precautions: Standard, fall, LVAD  Orthopedic Precautions:N/A   Braces: N/A  Respiratory Status: Room air    Exams:  RLE ROM: WFL  RLE Strength: WFL  LLE ROM: WFL  LLE Strength: WFL    Functional Mobility:  Transfers:     Sit to Stand:  supervision with no AD  Gait: pt amb 24' no AD supvn. Pt presented with fair william, upright posture, reciprocal gait  Balance:   Good sitting balance  Good standing balance      AM-PAC 6 CLICK MOBILITY  Total Score:22       Treatment & Education:  Educated pt on PT role/POC  Educated pt on importance of OOB activity and daily ambulation   Pt educated on proper body mechanics, safety techniques, and energy conservation with PT facilitation and cueing throughout session   Pt verbalized understanding      Patient left sitting at foot of bed with all lines intact, call button in reach, RN notified, and OT present.    GOALS:   Multidisciplinary Problems       Physical Therapy Goals          Problem: Physical Therapy    Goal Priority Disciplines Outcome Interventions   Physical Therapy Goal     PT, PT/OT Progressing    Description: Goals to be met by: 2025     Patient will increase functional independence with mobility by performin. Supine to sit with Modified [place]  2. Sit to supine with Modified [place]  3. Sit to stand transfer with Supervision  4. Bed to chair transfer with Supervision using LRAD  5. Gait  x 300 feet with Supervision using LRAD.   6. Ascend/descend 15 stairs with right Handrails Supervision using LRAD.                          History:     Past Medical History:   Diagnosis Date    Acute respiratory failure with hypoxia 2023    Arthritis     Awareness alteration, transient 2022    Cardiomyopathy     CHF (congestive heart failure) 10/01/2020    COVID-19 2023     Diabetes mellitus     Dilated cardiomyopathy 10/26/2020    Drug abuse 10/2020    Headache 04/19/2023    Hyperglycemia 12/16/2022    Hyperosmolar hyperglycemic state (HHS) 05/25/2022    ICD (implantable cardioverter-defibrillator) in place 10/26/2020    Infected defibrillator now s/p removal Nov 2023 07/23/2023    Left ventricular assist device (LVAD) complication, initial encounter 06/05/2023    Muscle cramping 06/15/2022    Renal disorder     SOB (shortness of breath) 06/13/2022    Tingling in extremities 07/13/2022       Past Surgical History:   Procedure Laterality Date    APPLICATION OF WOUND VACUUM-ASSISTED CLOSURE DEVICE N/A 6/30/2022    Procedure: APPLICATION, WOUND VAC;  Surgeon: Luis F Paige MD;  [facility]  Service: Cardiovascular;  Laterality: N/A;  50 x 5 cm    CARDIAC DEFIBRILLATOR PLACEMENT      COLONOSCOPY N/A 9/25/2024    Procedure: COLONOSCOPY;  Surgeon: Drake Barton MD;  Location: [facility] (2ND FLR);  Service: Endoscopy;  Laterality: N/A;    ECHOCARDIOGRAM,TRANSESOPHAGEAL  11/9/2023    Procedure: Transesophageal echo (GUILLERMO) intra-procedure log documentation;  Surgeon: Eron Ivy MD;  Location: [facility] EP LAB;  Service: Cardiology;;    ESOPHAGOGASTRODUODENOSCOPY N/A 9/25/2024    Procedure: EGD (ESOPHAGOGASTRODUODENOSCOPY);  Surgeon: Drake Barton MD;  Location: [facility] ENDO (2ND FLR);  Service: Endoscopy;  Laterality: N/A;    EXTRACTION, ELECTRODE LEAD Left 11/9/2023    Procedure: EXTRACTION, ELECTRODE LEAD;  Surgeon: ADALID Leon MD;  Location: [facility] EP LAB;  Service: Cardiology;  Laterality: Left;  INFECTION, LEAD EXTRACTION, GUILLERMO, BSCI, ANES, EH,   *NO CTS BACKUP*    IMPLANTATION OF RIGHT VENTRICULAR ASSIST DEVICE (RVAD) N/A 6/29/2022    Procedure: INSERTION, RVAD;  Surgeon: Luis F Paige MD;  Location: [facility] OR [facility] FLR;  Service: Cardiovascular;  Laterality: N/A;    INSERTION OF GRAFT TO PERICARDIUM Right 6/30/2022    Procedure: INSERTION-RIGHT VENTRICULAR  ASSIST DEVICE;  Surgeon: Luis F Paige MD;  [facility];  Service: Cardiovascular;  Laterality: Right;    IRRIGATION OF MEDIASTINUM  6/30/2022    Procedure: IRRIGATION, MEDIASTINUM;  Surgeon: Luis F Paige MD;  [facility];  Service: Cardiovascular;;    LEFT VENTRICULAR ASSIST DEVICE Left 6/23/2022    Procedure: INSERTION-LEFT VENTRICULAR ASSIST DEVICE;  Surgeon: Luis F Paige MD;  [facility];  Service: Cardiovascular;  Laterality: Left;    LEFT VENTRICULAR ASSIST DEVICE N/A 6/29/2022    Procedure: INSERTION-LEFT VENTRICULAR ASSIST DEVICE;  Surgeon: Luis F Paige MD;  [facility];  Service: Cardiovascular;  Laterality: N/A;    REVISION OF SKIN POCKET FOR CARDIOVERTER-DEFIBRILLATOR  11/9/2023    Procedure: REVISION, SKIN POCKET, FOR CARDIOVERTER-DEFIBRILLATOR;  Surgeon: ADALID Leon MD;  Location: [facility] EP LAB;  Service: Cardiology;;    RIGHT HEART CATHETERIZATION Right 4/8/2022    Procedure: INSERTION, CATHETER, RIGHT HEART;  Surgeon: Luca Lopez Jr., MD;  Location: [facility] CATH LAB;  Service: Cardiology;  Laterality: Right;    RIGHT HEART CATHETERIZATION Right 4/19/2022    Procedure: INSERTION, CATHETER, RIGHT HEART;  Surgeon: Josh Pulido MD;  Location: [facility] CATH LAB;  Service: Cardiology;  Laterality: Right;    RIGHT HEART CATHETERIZATION Right 7/21/2022    Procedure: INSERTION, CATHETER, RIGHT HEART;  Surgeon: Dalia Crum MD;  Location: [facility] CATH LAB;  Service: Cardiology;  Laterality: Right;    RIGHT HEART CATHETERIZATION Right 10/31/2022    Procedure: INSERTION, CATHETER, RIGHT HEART;  Surgeon: Dalia Crum MD;  Location: [facility] CATH LAB;  Service: Cardiology;  Laterality: Right;    STERNAL WOUND CLOSURE N/A 6/30/2022    Procedure: CLOSURE, WOUND, STERNUM;  Surgeon: Luis F Paige MD;  [facility];  Service: Cardiovascular;  Laterality: N/A;       Time Tracking:     PT Received On: 12/31/24  PT Start Time: 0998      PT Stop Time: 0945  PT Total Time (min): 10 min     Billable Minutes: Evaluation 10    Co-treatment performed due to patient's multiple deficits requiring two skilled therapists to appropriately and safely assess patient's strength and endurance while facilitating functional tasks in addition to accommodating for patient's activity tolerance.       12/31/2024

## 2024-12-31 NOTE — SUBJECTIVE & OBJECTIVE
Past Medical History:   Diagnosis Date    Acute respiratory failure with hypoxia 07/22/2023    Arthritis     Awareness alteration, transient 09/01/2022    Cardiomyopathy     CHF (congestive heart failure) 10/01/2020    COVID-19 06/03/2023    Diabetes mellitus     Dilated cardiomyopathy 10/26/2020    Drug abuse 10/2020    Headache 04/19/2023    Hyperglycemia 12/16/2022    Hyperosmolar hyperglycemic state (HHS) 05/25/2022    ICD (implantable cardioverter-defibrillator) in place 10/26/2020    Infected defibrillator now s/p removal Nov 2023 07/23/2023    Left ventricular assist device (LVAD) complication, initial encounter 06/05/2023    Muscle cramping 06/15/2022    Renal disorder     SOB (shortness of breath) 06/13/2022    Tingling in extremities 07/13/2022       Past Surgical History:   Procedure Laterality Date    APPLICATION OF WOUND VACUUM-ASSISTED CLOSURE DEVICE N/A 6/30/2022    Procedure: APPLICATION, WOUND VAC;  Surgeon: Luis F Paige MD;  Location: [facility] OR [facility] FLR;  Service: Cardiovascular;  Laterality: N/A;  50 x 5 cm    CARDIAC DEFIBRILLATOR PLACEMENT      COLONOSCOPY N/A 9/25/2024    Procedure: COLONOSCOPY;  Surgeon: Drake Barton MD;  Location: [facility] ENDO (2ND FLR);  Service: Endoscopy;  Laterality: N/A;    ECHOCARDIOGRAM,TRANSESOPHAGEAL  11/9/2023    Procedure: Transesophageal echo (GUILLERMO) intra-procedure log documentation;  Surgeon: Eron Ivy MD;  Location: [facility] EP LAB;  Service: Cardiology;;    ESOPHAGOGASTRODUODENOSCOPY N/A 9/25/2024    Procedure: EGD (ESOPHAGOGASTRODUODENOSCOPY);  Surgeon: Drake Barton MD;  Location: [facility] ENDO (2ND FLR);  Service: Endoscopy;  Laterality: N/A;    EXTRACTION, ELECTRODE LEAD Left 11/9/2023    Procedure: EXTRACTION, ELECTRODE LEAD;  Surgeon: ADALID Leon MD;  Location: [facility] EP LAB;  Service: Cardiology;  Laterality: Left;  INFECTION, LEAD EXTRACTION, GUILLERMO, BSCI, ANES, EH,   *NO CTS BACKUP*    IMPLANTATION OF RIGHT VENTRICULAR ASSIST DEVICE (RVAD)  N/A 6/29/2022    Procedure: INSERTION, RVAD;  Surgeon: Luis F Paige MD;  Location: [facility] OR 2ND FLR;  Service: Cardiovascular;  Laterality: N/A;    INSERTION OF GRAFT TO PERICARDIUM Right 6/30/2022    Procedure: INSERTION-RIGHT VENTRICULAR ASSIST DEVICE;  Surgeon: Luis F Paige MD;  Location: [facility] OR 2ND FLR;  Service: Cardiovascular;  Laterality: Right;    IRRIGATION OF MEDIASTINUM  6/30/2022    Procedure: IRRIGATION, MEDIASTINUM;  Surgeon: Luis F Paige MD;  Location: [facility] OR 2ND FLR;  Service: Cardiovascular;;    LEFT VENTRICULAR ASSIST DEVICE Left 6/23/2022    Procedure: INSERTION-LEFT VENTRICULAR ASSIST DEVICE;  Surgeon: Luis F Paige MD;  Location: [facility] OR 2ND FLR;  Service: Cardiovascular;  Laterality: Left;    LEFT VENTRICULAR ASSIST DEVICE N/A 6/29/2022    Procedure: INSERTION-LEFT VENTRICULAR ASSIST DEVICE;  Surgeon: Luis F Paige MD;  Location: [facility] OR 2ND FLR;  Service: Cardiovascular;  Laterality: N/A;    REVISION OF SKIN POCKET FOR CARDIOVERTER-DEFIBRILLATOR  11/9/2023    Procedure: REVISION, SKIN POCKET, FOR CARDIOVERTER-DEFIBRILLATOR;  Surgeon: ADALID Leon MD;  Location: [facility] EP LAB;  Service: Cardiology;;    RIGHT HEART CATHETERIZATION Right 4/8/2022    Procedure: INSERTION, CATHETER, RIGHT HEART;  Surgeon: Luca Lopez Jr., MD;  Location: [facility] CATH LAB;  Service: Cardiology;  Laterality: Right;    RIGHT HEART CATHETERIZATION Right 4/19/2022    Procedure: INSERTION, CATHETER, RIGHT HEART;  Surgeon: Josh Pulido MD;  Location: [facility] CATH LAB;  Service: Cardiology;  Laterality: Right;    RIGHT HEART CATHETERIZATION Right 7/21/2022    Procedure: INSERTION, CATHETER, RIGHT HEART;  Surgeon: Dlaia Crum MD;  Location: [facility] CATH LAB;  Service: Cardiology;  Laterality: Right;    RIGHT HEART CATHETERIZATION Right 10/31/2022    Procedure: INSERTION, CATHETER, RIGHT HEART;  Surgeon: Dalia Crum MD;  Location: [facility] CATH LAB;  Service: Cardiology;  Laterality: Right;     STERNAL WOUND CLOSURE N/A 6/30/2022    Procedure: CLOSURE, WOUND, STERNUM;  Surgeon: Luis F Paige MD;  Location: [facility] OR [address] [place] [address];  Service: Cardiovascular;  Laterality: N/A;       Review of patient's allergies indicates:   Allergen Reactions    Bumex [bumetanide] Hives       Current Facility-Administered Medications   Medication    amiodarone tablet 200 mg    amLODIPine tablet 5 mg    aspirin EC tablet 81 mg    atorvastatin tablet 40 mg    cefadroxil capsule 500 mg    dextrose 50% injection 12.5 g    dextrose 50% injection 25 g    DULoxetine DR capsule 30 mg    eplerenone tablet 50 mg    furosemide injection 80 mg    gabapentin capsule 100 mg    And    gabapentin capsule 400 mg    glucagon (human recombinant) injection 1 mg    glucose chewable tablet 16 g    glucose chewable tablet 24 g    insulin aspart U-100 pen 0-10 Units    insulin aspart U-100 pen 4 Units    insulin glargine U-100 (Lantus) pen 10 Units    levETIRAcetam tablet 1,500 mg    potassium chloride SA CR tablet 40 mEq    warfarin split tablet 1.5 mg     Current Outpatient Medications   Medication Sig    acetaminophen (TYLENOL) 325 MG tablet Take 2 tablets (650 mg total) by mouth every 6 (six) hours as needed for Pain.    amiodarone (PACERONE) 200 MG Tab Take 1 tablet (200 mg total) by mouth once daily.    amLODIPine (NORVASC) 5 MG tablet Take 1 tablet (5 mg total) by mouth once daily.    aspirin (ECOTRIN) 81 MG EC tablet Take 1 tablet (81 mg total) by mouth once daily.    atorvastatin (LIPITOR) 40 MG tablet Take 1 tablet (40 mg total) by mouth once daily.    blood sugar diagnostic Strp Use to test blood glucose 4 (four) times daily.    blood-glucose meter (TRUE METRIX GLUCOSE METER) Misc Use to test blood glucose 4 (four) times daily.    blood-glucose sensor (FREESTYLE LUCIUS 3 SENSOR) Dee 1 Device by Misc.(Non-Drug; Combo Route) route every 14 (fourteen) days.    cefadroxil (DURICEF) 500 MG Cap Take 1 capsule (500 mg total) by mouth every 12 (twelve)  "hours.    D5W PgBk 50 mL with ceFAZolin 2 gram SolR 6 g Inject 6 g into the vein once daily.    DULoxetine (CYMBALTA) 30 MG capsule Take 1 capsule (30 mg total) by mouth once daily.    eplerenone (INSPRA) 25 MG Tab Take 2 tablets (50 mg total) by mouth once daily.    gabapentin (NEURONTIN) 100 MG capsule Take 1 capsule (100 mg total) by mouth 2 (two) times daily AND 4 capsules (400 mg total) every evening.    glucagon (BAQSIMI) 3 mg/actuation Spry 1 each by Nasal route as needed (hypoglycemia).    insulin aspart U-100 (NOVOLOG) 100 unit/mL (3 mL) InPn pen Inject 18 Units into the skin 3 (three) times daily with meals: MAX 94 units/daily  150-200    201-250    251-300    301-350    >350  +2 units   +4 units    +6 units    +8 units    +10 units    insulin detemir U-100, Levemir, 100 unit/mL (3 mL) SubQ InPn pen Inject 12 Units into the skin 2 (two) times daily. In the morning and before bed.    lancets 33 gauge Misc Use to test blood glucose 4 (four) times daily.    levETIRAcetam (KEPPRA) 1000 MG tablet Take 1.5 tablets (1,500 mg total) by mouth 2 (two) times daily.    magnesium oxide (MAG-OX) 400 mg (241.3 mg magnesium) tablet Take 1 tablet (400 mg total) by mouth once daily.    ondansetron (ZOFRAN-ODT) 4 MG TbDL Take 1 tablet (4 mg total) by mouth every 8 (eight) hours as needed (nausea).    pantoprazole (PROTONIX) 40 MG tablet Take 1 tablet (40 mg total) by mouth once daily.    pen needle, diabetic 32 gauge x 5/32" Ndle 1 each by Misc.(Non-Drug; Combo Route) route 4 (four) times daily.    polyethylene glycol (GLYCOLAX) 17 gram PwPk Take 17 g by mouth once daily.    potassium chloride SA (K-DUR,KLOR-CON M) 10 MEQ tablet Take 3 tablets (30 mEq total) by mouth once daily.    senna-docusate 8.6-50 mg (PERICOLACE) 8.6-50 mg per tablet Take 1 tablet by mouth daily as needed for Constipation.    torsemide (DEMADEX) 20 MG Tab Take 3 tablets (60 mg total) by mouth 2 (two) times a day.    warfarin (COUMADIN) 3 MG tablet Take " 0.5 tablets (1.5 mg total) by mouth Daily.     Family History       Problem Relation (Age of Onset)    Diverticulosis Brother    Heart attack Maternal Grandmother, Maternal Grandfather    Heart failure Father          Tobacco Use    Smoking status: Former     Current packs/day: 0.00     Average packs/day: 0.5 packs/day for 16.6 years (8.3 ttl pk-yrs)     Types: Cigarettes     Start date: 10/1/2004     Quit date: 4/23/2021     Years since quitting: 3.6    Smokeless tobacco: Never   Substance and Sexual Activity    Alcohol use: Not Currently    Drug use: Not Currently     Types: Marijuana, MDMA (Ecstacy)    Sexual activity: Yes     Partners: Female     Birth control/protection: None     Review of Systems   Constitutional:  Positive for activity change. Negative for appetite change, chills and fever.   Respiratory:  Positive for shortness of breath.    Cardiovascular:  Positive for leg swelling. Negative for chest pain and palpitations.   Gastrointestinal:  Positive for abdominal distention.   Genitourinary:  Negative for difficulty urinating.   Neurological:  Negative for dizziness and light-headedness.   Psychiatric/Behavioral:  Negative for agitation, behavioral problems and confusion.      Objective:     Vital Signs (Most Recent):  Temp: 98.6 °F (37 °C) (12/31/24 0445)  Pulse: 85 (12/31/24 0445)  Resp: 20 (12/31/24 0445)  BP: (!) 68/0 (12/31/24 0345)  SpO2: 95 % (12/31/24 0445) Vital Signs (24h Range):  Temp:  [98.5 °F (36.9 °C)-98.6 °F (37 °C)] 98.6 °F (37 °C)  Pulse:  [68-95] 85  Resp:  [20] 20  SpO2:  [95 %] 95 %  BP: (68)/(0) 68/0     Patient Vitals for the past 72 hrs (Last 3 readings):   Weight   12/31/24 0218 79.4 kg (175 lb 0.7 oz)     Body mass index is 21.88 kg/m².    No intake or output data in the 24 hours ending 12/31/24 0554       Physical Exam  Constitutional:       General: He is not in acute distress.     Appearance: He is normal weight. He is not ill-appearing, toxic-appearing or diaphoretic.  "  HENT:      Head: Normocephalic.      Nose: Nose normal.      Mouth/Throat:      Mouth: Mucous membranes are moist.   Eyes:      Extraocular Movements: Extraocular movements intact.   Cardiovascular:      Rate and Rhythm: Normal rate and regular rhythm.      Pulses: Normal pulses.      Comments: Smooth LVAD hum.   Pulmonary:      Effort: Pulmonary effort is normal. No respiratory distress.      Breath sounds: Normal breath sounds. No wheezing or rales.   Abdominal:      General: Abdomen is flat. There is distension.      Tenderness: There is no abdominal tenderness.   Musculoskeletal:         General: Swelling (2+ bilateral pitting edema.) present. Normal range of motion.   Skin:     General: Skin is warm.   Neurological:      Mental Status: He is alert and oriented to person, place, and time. Mental status is at baseline.   Psychiatric:         Mood and Affect: Mood normal.         Behavior: Behavior normal.         Thought Content: Thought content normal.            Significant Labs:  CBC:  Recent Labs   Lab 12/31/24  0339   WBC 11.55   RBC 4.35*   HGB 7.9*   HCT 27.5*      MCV 63*   MCH 18.2*   MCHC 28.7*     BNP:  Recent Labs   Lab 12/31/24  0339   *     CMP:  Recent Labs   Lab 12/31/24  0339   *   CALCIUM 8.2*   ALBUMIN 2.4*   PROT 8.6*   *   K 3.7   CO2 22*   CL 99   BUN 16   CREATININE 1.1   ALKPHOS 153*   ALT 7*   AST 38   BILITOT 1.8*      Coagulation:   Recent Labs   Lab 12/27/24  1221   INR 1.8*     LDH:  No results for input(s): "LDH" in the last 72 hours.  Microbiology:  Microbiology Results (last 7 days)       ** No results found for the last 168 hours. **            BMP:   Recent Labs   Lab 12/31/24  0339   *   *   K 3.7   CL 99   CO2 22*   BUN 16   CREATININE 1.1   CALCIUM 8.2*     Cardiac Markers: No results for input(s): "CKMB", "TROPONINT", "MYOGLOBIN" in the last 72 hours.  Coagulation: No results for input(s): "PT", "INR", "APTT" in the last 72 " "hours.  Prealbumin: No results for input(s): "PREALBUMIN" in the last 72 hours.  I have reviewed all pertinent labs within the past 24 hours.    Diagnostic Results:  I have reviewed all pertinent imaging results/findings within the past 24 hours.  Imaging Results              X-Ray Chest AP Portable (In process)                       "

## 2024-12-31 NOTE — ASSESSMENT & PLAN NOTE
-NICM  -Last 2D Echo 8/26/24: LVEF 5-10%, LVEDD 7.2 cm  -Hypervolemic on examination today  -Current diuretic regimen: Furosemide IVP. Will continue current regimen for now.    -GDMT with home dose of Eplerenone.  Will add as tolerated   -Patient is not currently being evaluated for OHTx due to non compliance  -2g Na dietary restriction, 1500 mL fluid restriction, strict I/Os

## 2024-12-31 NOTE — SUBJECTIVE & OBJECTIVE
Interval HPI:   No acute events overnight. Patient in room ED . Blood glucose worsening. BG above goal on current insulin regimen (N/A). Steroid use- None.    Renal function- Normal   Lab Results   Component Value Date    CREATININE 1.1 2024     Vasopressors-  None     Endocrine will continue to follow and manage insulin orders inpatient.     Diet diabetic Cardiac (Low Na/Chol), Low Sodium,2gm; 2000 Calories (up to 75 gm per meal); Fluid - 1500mL     Eatin%  Nausea: No  Hypoglycemia and intervention: No  Fever: No  TPN and/or TF: No  If yes, type of TF/TPN and rate: N/A    PMH, PSH, FH, SH updated and reviewed     ROS:  Review of Systems   Constitutional:  Negative for chills, fatigue, fever and unexpected weight change.   Eyes:  Negative for visual disturbance.   Respiratory:  Positive for shortness of breath.    Cardiovascular:  Positive for chest pain (with coughing).   Gastrointestinal:  Positive for abdominal pain and constipation. Negative for diarrhea, nausea and vomiting.   Endocrine: Positive for polydipsia. Negative for polyphagia and polyuria.   Musculoskeletal:         Bilateral lower extremity swelling   Neurological:  Negative for dizziness, weakness, numbness and headaches.       Current Medications and/or Treatments Impacting Glycemic Control  Immunotherapy:    Immunosuppressants       None          Steroids:   Hormones (From admission, onward)      None          Pressors:    Autonomic Drugs (From admission, onward)      None          Hyperglycemia/Diabetes Medications:   Antihyperglycemics (From admission, onward)      Start     Stop Route Frequency Ordered    24 0900  insulin glargine U-100 (Lantus) pen 10 Units         -- SubQ 2 times daily 24 0538    24 0715  insulin aspart U-100 pen 4 Units         -- SubQ 3 times daily with meals 24 0538    24 0638  insulin aspart U-100 pen 0-10 Units         -- SubQ Before meals & nightly PRN 24 0538              PHYSICAL EXAMINATION:  Vitals:    12/31/24 0700   BP: (!) 76/0   Pulse: 78   Resp: 17   Temp: 98 °F (36.7 °C)     Body mass index is 21.88 kg/m².     Physical Exam  Constitutional:       Appearance: Normal appearance.   HENT:      Head: Normocephalic and atraumatic.      Nose: Nose normal.   Eyes:      Conjunctiva/sclera: Conjunctivae normal.   Cardiovascular:      Rate and Rhythm: Normal rate.   Pulmonary:      Effort: Pulmonary effort is normal. No respiratory distress.   Abdominal:      General: There is no distension.   Musculoskeletal:      Right lower leg: Edema present.      Left lower leg: Edema present.   Neurological:      Mental Status: He is alert.   Psychiatric:         Mood and Affect: Mood normal.         Behavior: Behavior normal.         Thought Content: Thought content normal.         Judgment: Judgment normal.

## 2024-12-31 NOTE — ED NOTES
Tele box [id number] applied to pt. Tech in war room states able to see pt on monitor, rhythm LVAD artifact with HR 82.

## 2024-12-31 NOTE — CONSULTS
Patient was seen and admitted to HTS team. Please refer to WILLIAM&P.    Eleuterio Genao MD  Cardiovascular Disease PGY6  [facility]

## 2024-12-31 NOTE — ASSESSMENT & PLAN NOTE
Procedure: Device Interrogation Including analysis of device parameters  Current Settings: Ventricular Assist Device  Review of device function is stable/unstable stable        12/31/2024     3:13 AM 11/21/2024     4:40 PM 11/21/2024     2:44 PM 9/29/2024     3:20 PM 9/29/2024    11:11 AM 9/29/2024     8:00 AM 9/29/2024     4:06 AM   TXP LVAD INTERROGATIONS   Type HeartMate3 HeartMate3  HeartMate3 HeartMate3 HeartMate3 HeartMate3   Flow 51 3.9  3.8 4.2 4.2 4   Speed 51 5100  5100 5100 5150 5100   PI 3.7 6.2  6.6 5.2 5.1 5.9   Power (Serna) 3.6 3.6  3.6 3.5 3.6 3.5   LSL    4700 4700 4700    Pulsatility No Pulse No Pulse No Pulse Intermittent pulse Intermittent pulse Intermittent pulse Intermittent pulse

## 2024-12-31 NOTE — HPI
"Reason for Consult: Management of T2DM, Hyperglycemia     Surgical Procedure and Date: LVAD 06/29/2022      Diabetes diagnosis year: 2022    Lab Results   Component Value Date    HGBA1C 10.3 (H) 08/25/2024       Home Diabetes Medications:  Levemir 20 units twice daily and Novolog 10 or 18 units with meals per patient    How often checking glucose at home? 3-4x day   BG readings on regimen: 150-200s  Hypoglycemia on the regimen?  Yes, at least once weekly; experiences blurred vision  Missed doses on regimen?  Yes, he has been out of Levemir "for a while" since it has been discontinued    Diabetes Complications include:   Hyperglycemia     Complicating diabetes co morbidities:   History of MI, CHF, and CKD      HPI:   Patient is a 39 y.o. 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 He underwent RVAD removal and chest closure 6/30/2022. He presented with one month hx of dyspnea and abdominal distention. He reported bilateral LE edema which prompted him to come to the ED with the assistance of his family as they drove from [place]. He reported intermittent chest pain with coughing. Endo consulted for BG management.          "

## 2024-12-31 NOTE — ASSESSMENT & PLAN NOTE
-HeartMate 3 Implanted 6/23/2022 with early RV failure requiring RVAD with ProTek Duo   -Continue Coumadin,  Goal INR 2.0-3.0 . Therapeutic today.  Previous home regimen 1.5mg Qdaily  -Antiplatelets ASA 81 mg, on hold with GIB  -LDH is stable overall today. Will continue to monitor daily.  -Speed set at 5100  -Interrogation notable for no events  -Not listed for OHTx      Procedure: Device Interrogation Including analysis of device parameters  Current Settings: Ventricular Assist Device  Review of device function is stable      12/31/2024     3:13 AM 11/21/2024     4:40 PM 11/21/2024     2:44 PM 9/29/2024     3:20 PM 9/29/2024    11:11 AM 9/29/2024     8:00 AM 9/29/2024     4:06 AM   TXP LVAD INTERROGATIONS   Type HeartMate3 HeartMate3  HeartMate3 HeartMate3 HeartMate3 HeartMate3   Flow 51 3.9  3.8 4.2 4.2 4   Speed 51 5100  5100 5100 5150 5100   PI 3.7 6.2  6.6 5.2 5.1 5.9   Power (Serna) 3.6 3.6  3.6 3.5 3.6 3.5   LSL    4700 4700 4700    Pulsatility No Pulse No Pulse No Pulse Intermittent pulse Intermittent pulse Intermittent pulse Intermittent pulse

## 2024-12-31 NOTE — PLAN OF CARE
Problem: Occupational Therapy  Goal: Occupational Therapy Goal  Description: Goals to be met by: 1/28/25     Patient will increase functional independence with ADLs by performing:    UE Dressing with Basye.  LE Dressing with Basye.  Grooming while standing at sink with Basye.  Toileting from toilet with Basye for hygiene and clothing management.   Toilet transfer to toilet with Basye.    Outcome: Progressing     OT evaluation completed and goals established.

## 2024-12-31 NOTE — ASSESSMENT & PLAN NOTE
Endocrinology consulted for BG management.   BG goal 140-180    WBD at 0.5 u/kg/day  - Lantus (Insulin Glargine) 20 units daily  - Novolog (Insulin Aspart) 6 units TIDWM and prn for BG excursions [facility] SSI (150/25)  - BG checks AC/HS  - Hypoglycemia protocol in place    ** Please notify Endocrine for any change and/or advance in diet**  ** Please call Endocrine for any BG related issues **    Discharge Planning:   TBD. Please notify endocrinology prior to discharge.

## 2024-12-31 NOTE — ED PROVIDER NOTES
Encounter Date: 12/31/2024       History     Chief Complaint   Patient presents with    Abdominal Pain    Shortness of Breath     Pt has c/o abd pain, swelling x 1 month. +SOB. Denies N/V/D. LVAD pt. Coordinator Meghann weber.     Chest Pain     Pt also endorses chest pain and productive cough (sputum clear in color)     The history is provided by the patient and a parent.     Kevan Queen is a 39 y.o. male, PMH CHF s/p LVAD, DMT2, polysubstance use disorder, Seizure disorder,  presenting with complaints of lower extremity edema and increased abdominal distention. Pt was recently seen in OSH ED for similar symptoms but is reporting this is far more severe today. Pt also reports progressive SOB that he feels at rest. He reports generalized chest pain but cannot point out any focal area of discomfort.  He reports occasional cough over the last few days but denies other sick symptoms. Pt reports recently being switched to Lasix because he had medication rash and feels this medication doesn't work as well as Bumex did for him.   Review of patient's allergies indicates:   Allergen Reactions    Bumex [bumetanide] Hives     Past Medical History:   Diagnosis Date    Acute respiratory failure with hypoxia 07/22/2023    Arthritis     Awareness alteration, transient 09/01/2022    Cardiomyopathy     CHF (congestive heart failure) 10/01/2020    COVID-19 06/03/2023    Diabetes mellitus     Dilated cardiomyopathy 10/26/2020    Drug abuse 10/2020    Headache 04/19/2023    Hyperglycemia 12/16/2022    Hyperosmolar hyperglycemic state (HHS) 05/25/2022    ICD (implantable cardioverter-defibrillator) in place 10/26/2020    Infected defibrillator now s/p removal Nov 2023 07/23/2023    Left ventricular assist device (LVAD) complication, initial encounter 06/05/2023    Muscle cramping 06/15/2022    Renal disorder     SOB (shortness of breath) 06/13/2022    Tingling in extremities 07/13/2022     Past Surgical History:    Procedure Laterality Date    APPLICATION OF WOUND VACUUM-ASSISTED CLOSURE DEVICE N/A 6/30/2022    Procedure: APPLICATION, WOUND VAC;  Surgeon: Luis F Paige MD;  Location: [facility] OR [facility] FLR;  Service: Cardiovascular;  Laterality: N/A;  50 x 5 cm    CARDIAC DEFIBRILLATOR PLACEMENT      COLONOSCOPY N/A 9/25/2024    Procedure: COLONOSCOPY;  Surgeon: Drake Barton MD;  Location: [facility] ENDO (2ND FLR);  Service: Endoscopy;  Laterality: N/A;    ECHOCARDIOGRAM,TRANSESOPHAGEAL  11/9/2023    Procedure: Transesophageal echo (GUILLERMO) intra-procedure log documentation;  Surgeon: Eron Ivy MD;  Location: [facility] EP LAB;  Service: Cardiology;;    ESOPHAGOGASTRODUODENOSCOPY N/A 9/25/2024    Procedure: EGD (ESOPHAGOGASTRODUODENOSCOPY);  Surgeon: Drake Barton MD;  Location: [facility] ENDO (2ND FLR);  Service: Endoscopy;  Laterality: N/A;    EXTRACTION, ELECTRODE LEAD Left 11/9/2023    Procedure: EXTRACTION, ELECTRODE LEAD;  Surgeon: ADALID Leon MD;  Location: [facility] EP LAB;  Service: Cardiology;  Laterality: Left;  INFECTION, LEAD EXTRACTION, GUILLERMO, BSCI, ANES, EH,   *NO CTS BACKUP*    IMPLANTATION OF RIGHT VENTRICULAR ASSIST DEVICE (RVAD) N/A 6/29/2022    Procedure: INSERTION, RVAD;  Surgeon: Luis F Paige MD;  [facility];  Service: Cardiovascular;  Laterality: N/A;    INSERTION OF GRAFT TO PERICARDIUM Right 6/30/2022    Procedure: INSERTION-RIGHT VENTRICULAR ASSIST DEVICE;  Surgeon: Luis F Paige MD;  [facility];  Service: Cardiovascular;  Laterality: Right;    IRRIGATION OF MEDIASTINUM  6/30/2022    Procedure: IRRIGATION, MEDIASTINUM;  Surgeon: Luis F Paige MD;  [facility];  Service: Cardiovascular;;    LEFT VENTRICULAR ASSIST DEVICE Left 6/23/2022    Procedure: INSERTION-LEFT VENTRICULAR ASSIST DEVICE;  Surgeon: Luis F Paige MD;  [facility];  Service: Cardiovascular;  Laterality: Left;    LEFT VENTRICULAR ASSIST DEVICE N/A 6/29/2022    Procedure:  INSERTION-LEFT VENTRICULAR ASSIST DEVICE;  Surgeon: Luis F Paige MD;  [facility];  Service: Cardiovascular;  Laterality: N/A;    REVISION OF SKIN POCKET FOR CARDIOVERTER-DEFIBRILLATOR  11/9/2023    Procedure: REVISION, SKIN POCKET, FOR CARDIOVERTER-DEFIBRILLATOR;  Surgeon: ADALID Leon MD;  Location: [facility] EP LAB;  Service: Cardiology;;    RIGHT HEART CATHETERIZATION Right 4/8/2022    Procedure: INSERTION, CATHETER, RIGHT HEART;  Surgeon: Lcua Lopez Jr., MD;  Location: [facility] CATH LAB;  Service: Cardiology;  Laterality: Right;    RIGHT HEART CATHETERIZATION Right 4/19/2022    Procedure: INSERTION, CATHETER, RIGHT HEART;  Surgeon: Josh Pulido MD;  Location: [facility] CATH LAB;  Service: Cardiology;  Laterality: Right;    RIGHT HEART CATHETERIZATION Right 7/21/2022    Procedure: INSERTION, CATHETER, RIGHT HEART;  Surgeon: Dalia Crum MD;  Location: [facility] CATH LAB;  Service: Cardiology;  Laterality: Right;    RIGHT HEART CATHETERIZATION Right 10/31/2022    Procedure: INSERTION, CATHETER, RIGHT HEART;  Surgeon: Dalia Crum MD;  Location: [facility] CATH LAB;  Service: Cardiology;  Laterality: Right;    STERNAL WOUND CLOSURE N/A 6/30/2022    Procedure: CLOSURE, WOUND, STERNUM;  Surgeon: Luis F Paige MD;  [address]  Service: Cardiovascular;  Laterality: N/A;     Family History   Problem Relation Name Age of Onset    Heart failure Father      Diverticulosis Brother      Heart attack Maternal Grandmother      Heart attack Maternal Grandfather       Social History     Tobacco Use    Smoking status: Former     Current packs/day: 0.00     Average packs/day: 0.5 packs/day for 16.6 years (8.3 ttl pk-yrs)     Types: Cigarettes     Start date: 10/1/2004     Quit date: 4/23/2021     Years since quitting: 3.6    Smokeless tobacco: Never   Substance Use Topics    Alcohol use: Not Currently    Drug use: Not Currently     Types: Marijuana, MDMA (Ecstacy)     Review of  Systems    Physical Exam     Initial Vitals   BP Pulse Resp Temp SpO2   12/31/24 0345 12/31/24 0218 12/31/24 0218 12/31/24 0218 12/31/24 0445   (!) 68/0 68 20 98.5 °F (36.9 °C) 95 %      MAP       --                Physical Exam    Nursing note and vitals reviewed.  Constitutional: He appears well-developed and well-nourished. He is not diaphoretic. No distress.   HENT:   Head: Normocephalic.   Eyes: Conjunctivae and EOM are normal.   Neck:   Normal range of motion.  Cardiovascular:            Machine whir noted on cardiac exam from LVAD.    Pulmonary/Chest: Breath sounds normal. No respiratory distress. He has no wheezes. He has no rhonchi. He has no rales.   Abdominal: Abdomen is soft and protuberant. He exhibits distension. A surgical scar is present. There is no abdominal tenderness. There is no rebound and no guarding.   Musculoskeletal:         General: Edema present.      Cervical back: Normal range of motion.      Comments: 2+ pitting edema of the bilateral LE.      Neurological: He is alert. GCS score is 15. GCS eye subscore is 4. GCS verbal subscore is 5. GCS motor subscore is 6.   Skin: Skin is warm and dry.   Psychiatric: He has a normal mood and affect. His behavior is normal. Judgment and thought content normal.         ED Course   Procedures  Labs Reviewed   CBC W/ AUTO DIFFERENTIAL - Abnormal       Result Value    WBC 11.55      RBC 4.35 (*)     Hemoglobin 7.9 (*)     Hematocrit 27.5 (*)     MCV 63 (*)     MCH 18.2 (*)     MCHC 28.7 (*)     RDW 24.3 (*)     Platelets 283      MPV SEE COMMENT      Immature Granulocytes 0.5      Gran # (ANC) 9.8 (*)     Immature Grans (Abs) 0.06 (*)     Lymph # 0.8 (*)     Mono # 0.8      Eos # 0.0      Baso # 0.03      nRBC 0      Gran % 84.5 (*)     Lymph % 7.1 (*)     Mono % 7.3      Eosinophil % 0.3      Basophil % 0.3      Differential Method Automated     COMPREHENSIVE METABOLIC PANEL - Abnormal    Sodium 131 (*)     Potassium 3.7      Chloride 99      CO2 22 (*)      Glucose 282 (*)     BUN 16      Creatinine 1.1      Calcium 8.2 (*)     Total Protein 8.6 (*)     Albumin 2.4 (*)     Total Bilirubin 1.8 (*)     Alkaline Phosphatase 153 (*)     AST 38      ALT 7 (*)     eGFR >60.0      Anion Gap 10     B-TYPE NATRIURETIC PEPTIDE - Abnormal     (*)    PROTIME-INR - Abnormal    Prothrombin Time 20.9 (*)     INR 2.0 (*)    IRON AND TIBC - Abnormal    Iron 19 (*)     Transferrin 314      TIBC 465 (*)     Saturated Iron 4 (*)     Narrative:     add on Iron Panel-[id number] Ferritin-[id number] per Griffin Horta MD   12/31/2024  06:03 Eugene   POCT GLUCOSE - Abnormal    POCT Glucose 361 (*)    TROPONIN I HIGH SENSITIVITY    Troponin I High Sensitivity 10     TROPONIN I HIGH SENSITIVITY    Troponin I High Sensitivity 9      Narrative:     add on Iron Panel-[id number] Ferritin-[id number] per Griffin Horta MD   12/31/2024  06:03 Eugene   HEMOGLOBIN A1C   IRON AND TIBC   FERRITIN   FERRITIN    Ferritin 71      Narrative:     add on Iron Panel-[id number] Ferritin-[id number] per Griffin Horta MD   12/31/2024  06:03 Eugene   POCT GLUCOSE MONITORING CONTINUOUS        ECG Results              EKG 12-lead (Final result)        Collection Time Result Time QRS Duration OHS QTC Calculation    12/31/24 02:21:13 12/31/24 08:18:10 146 623                     Final result by Interface, Lab In [facility] (12/31/24 08:18:16)                   Narrative:    Test Reason : R07.9,    Vent. Rate :  98 BPM     Atrial Rate :    BPM     P-R Int :    ms          QRS Dur : 146 ms      QT Int : 488 ms       P-R-T Axes :    157 111 degrees    QTcB Int : 623 ms    Artifact consistent with LVAD   Sinus rhythm  Right axis deviation  Nonspecific intraventricular block  Anterolateral infarct  Abnormal ECG  When compared with ECG of 08-Dec-2024 22:13,  No significant change was found  Confirmed by Maikol Singh ([id number]) on 12/31/2024 8:18:05 AM    Referred By: AAAREFERRAL SELF           Confirmed By: Maikol  Samantha                                  Imaging Results               CT Abdomen Pelvis With IV Contrast NO Oral Contrast (Final result)  Result time 12/31/24 07:52:44      Final result by Eduardo Brady MD (12/31/24 07:52:44)                   Impression:      1. Increased disc space narrowing at L3-L4 with subtle endplate erosion, possibly representing advanced degenerative change for age although underlying chronic spondylodiscitis is not excluded.  MRI lumbar spine with and without contrast may be obtained for further characterization if clinically warranted.  2. Large volume ascites, increased from prior.  3. Trace bilateral pleural effusions, new.  4. Similar heterogeneous hepatic parenchymal attenuation, likely related to congestive hepatopathy.  Hypoattenuation in the right hepatic lobe corresponds with a portal venous shunt and/ or varix seen on CT abdomen pelvis 09/23/2024.  5. Additional findings as above.  This report was flagged in Epic as abnormal.    Electronically signed by resident: John Hunter  Date:    12/31/2024  Time:    07:00    Electronically signed by: Eduardo Brady MD  Date:    12/31/2024  Time:    07:52               Narrative:    EXAMINATION:  CT ABDOMEN PELVIS WITH IV CONTRAST    CLINICAL HISTORY:  Abdominal pain, acute, nonlocalized;    TECHNIQUE:  Low dose axial images, sagittal and coronal reformations were obtained from the lung bases to the pubic symphysis following the IV administration of 75 mL of Omnipaque 350 .  Oral contrast was not given.  Limited amount of contrast and timing of bolus limits evaluation.    COMPARISON:  CT abdomen pelvis 09/23/2024, CT chest abdomen pelvis 08/20/2024    FINDINGS:  Within the limitations of this contrast examination due to bolus timing and amount:    SOFT TISSUES: Diffuse body wall edema.    LUNG BASES/VISUALIZED MEDIASTINUM: Trace bilateral pleural effusions, new, with adjacent compressive atelectasis.  Global cardiac chamber  enlargement.  LVAD in place, incompletely imaged.    HEPATOBILIARY: Liver is enlarged measuring 21 cm craniocaudal.  Heterogeneous hepatic parenchymal attenuation, similar to prior.  Hypoattenuation in the right hepatic lobe (series 2, image 43) corresponds with a portal venous shunt and/or varix seen on CT abdomen pelvis 09/23/2024. Artifact overlying the liver degrades evaluation.  No biliary ductal dilatation.  Gallbladder is contracted.  No cholelithiasis.    PANCREAS: No focal masses or ductal dilatation.    SPLEEN: Normal size.    ADRENALS: No adrenal nodules.    KIDNEYS/URETERS: Kidneys are normal size and enhance symmetrically. Subcentimeter hypodensity in the interpolar region of the left kidney, too small to characterize but unchanged.  Unchanged 4 mm nonobstructive kidney stone in the left lower pole.  No hydronephrosis.  Visualized ureters are unremarkable.    BLADDER/PELVIC ORGANS: Unremarkable.    PERITONEUM / RETROPERITONEUM: Large volume ascites, increased compared to prior.  No free intraperitoneal air.    LYMPH NODES: No lymphadenopathy.    VESSELS: No aortic aneurysm.  Mild aortoiliac calcific atherosclerosis.  Major aortic branch vessels are grossly patent.  Portal venous system is patent.    GI TRACT: No evidence of bowel obstruction or inflammation. Normal appendix.    BONES: Increased disc space narrowing at L3-L4, predominantly affecting the anterior disc space, with anterior osteophytes and subtle endplate erosion.  This is an interval change from 2023 and although may represent that of degenerative change, spondylo discitis at that level not excluded noting no detrimental change from 09/23/2024.  No acute fractures or suspicious osseous lesions.                                       X-Ray Chest AP Portable (Final result)  Result time 12/31/24 06:03:39      Final result by Eduardo Brady MD (12/31/24 06:03:39)                   Impression:      No acute abnormality.      Electronically  signed by: Eduardo Brady MD  Date:    12/31/2024  Time:    06:03               Narrative:    EXAMINATION:  XR CHEST AP PORTABLE    CLINICAL HISTORY:  Chest Pain;    TECHNIQUE:  Single frontal view of the chest was performed.    COMPARISON:  12/08/2024    FINDINGS:  Sternotomy.  LEFT ventricular assist device.The lungs are clear with normal appearance of pulmonary vasculature. No pleural effusion. No evident pneumothorax.    The cardiac silhouette is enlarged yet unchanged in configuration.  The hilar and mediastinal contours are unremarkable.    Bones are intact.                                       Medications   amiodarone tablet 200 mg (200 mg Oral Given 12/31/24 0800)   amLODIPine tablet 5 mg (5 mg Oral Given 12/31/24 0800)   aspirin EC tablet 81 mg (81 mg Oral Given 12/31/24 0900)   atorvastatin tablet 40 mg (40 mg Oral Given 12/31/24 0800)   DULoxetine DR capsule 30 mg (30 mg Oral Given 12/31/24 0800)   eplerenone tablet 50 mg (50 mg Oral Given 12/31/24 0800)   gabapentin capsule 100 mg (100 mg Oral Given 12/31/24 0800)     And   gabapentin capsule 400 mg (has no administration in time range)   levETIRAcetam tablet 1,500 mg (1,500 mg Oral Given 12/31/24 0800)   warfarin split tablet 1.5 mg (has no administration in time range)   cefadroxil capsule 500 mg (500 mg Oral Given 12/31/24 0800)   glucose chewable tablet 16 g (has no administration in time range)   glucose chewable tablet 24 g (has no administration in time range)   dextrose 50% injection 12.5 g (has no administration in time range)   dextrose 50% injection 25 g (has no administration in time range)   glucagon (human recombinant) injection 1 mg (has no administration in time range)   insulin glargine U-100 (Lantus) pen 10 Units (10 Units Subcutaneous Given 12/31/24 0801)   insulin aspart U-100 pen 4 Units (4 Units Subcutaneous Given 12/31/24 0842)   insulin aspart U-100 pen 0-10 Units (has no administration in time range)   furosemide injection 80  mg (has no administration in time range)   potassium chloride SA CR tablet 40 mEq (40 mEq Oral Given 12/31/24 0800)   furosemide injection 60 mg (60 mg Intravenous Given 12/31/24 0529)   iohexoL (OMNIPAQUE 350) injection 75 mL (75 mLs Intravenous Given 12/31/24 0621)     Medical Decision Making  Kevan Queen  is a 39 y.o. male, presenting with complaints of LE swelling, abdominal distention, and SOB, history of present illness obtained from pt and mother as seen above. Patient able to provide adequate history of present illness and tolerated physical exam well. Patient found to be well appearing and in no acute distress , stable. Exam notable as above.     DDX includes but is not limited to: hypervolemia, CHF exacerbation, LVAD dysfunction, Electrolyte imbalance, ACS, PE.   He has some evidence of pulmonary edema on CXR, elevated BNP, worsening pitting edema, and evidence of pleural effusion on CT; overall clinical picture concerning for fluid overload. Pt started on Lasix with 300mL UOP. EKG without ST changes that would be concerning for ACS.     See ED course for additional discussion. I discussed this case with Cardiology who agreed to evaluate and provide recommendations for this patient. I updated his mother via phone who agreed with plan for admission to Cardiology.     Data Reviewed/Counseling: I have reviewed the patient's vital signs, nursing notes, and other relevant tests/information. Any incidental findings were discussed with the patient. I had a detailed discussion with the patient regarding the historical points, exam findings, and any diagnostic results supporting the diagnosis. Discussed case with Cardiology, who agreed to evaluate and admit patient.  Disposition: Admitted     Amount and/or Complexity of Data Reviewed  Labs: ordered. Decision-making details documented in ED Course.  Radiology: ordered. Decision-making details documented in ED Course.    Risk  Prescription drug  management.  Decision regarding hospitalization.               ED Course as of 12/31/24 0842   Tue Dec 31, 2024   0423 X-Ray Chest AP Portable  Worsening patchy opacification of the L lung when compared to prior on 12/8/24 [HB]   0443 Troponin I High Sensitivity: 10 [HB]   0443 Glucose(!): 282 [HB]   0444 BILIRUBIN TOTAL(!): 1.8  Similar to previous [HB]   0444 Hemoglobin(!): 7.9  Improved from previous [HB]   0511 BNP(!): 822  Improved from his last ED presentation several weeks ago [HB]   0515 Discussed with Cardiology who agreed to evaluate and provide recs [HB]      ED Course User Index  [HB] Es De Santiago MD                           Clinical Impression:  Final diagnoses:  [Z95.811] LVAD (left ventricular assist device) present  [R60.9] Pitting edema (Primary)  [J90] Pleural effusion  [R18.8] Other ascites          ED Disposition Condition    Admit                 Es De Santiago MD  Resident  12/31/24 0811

## 2025-01-01 LAB
ALBUMIN SERPL BCP-MCNC: 2.4 G/DL (ref 3.5–5.2)
ALP SERPL-CCNC: 154 U/L (ref 40–150)
ALT SERPL W/O P-5'-P-CCNC: 7 U/L (ref 10–44)
ANION GAP SERPL CALC-SCNC: 10 MMOL/L (ref 8–16)
ANION GAP SERPL CALC-SCNC: 13 MMOL/L (ref 8–16)
APTT PPP: 33.6 SEC (ref 21–32)
AST SERPL-CCNC: 26 U/L (ref 10–40)
BASOPHILS # BLD AUTO: 0.04 K/UL (ref 0–0.2)
BASOPHILS NFR BLD: 0.3 % (ref 0–1.9)
BILIRUB DIRECT SERPL-MCNC: 1.3 MG/DL (ref 0.1–0.3)
BILIRUB SERPL-MCNC: 1.9 MG/DL (ref 0.1–1)
BUN SERPL-MCNC: 14 MG/DL (ref 6–20)
BUN SERPL-MCNC: 16 MG/DL (ref 6–20)
CALCIUM SERPL-MCNC: 8.1 MG/DL (ref 8.7–10.5)
CALCIUM SERPL-MCNC: 8.6 MG/DL (ref 8.7–10.5)
CHLORIDE SERPL-SCNC: 98 MMOL/L (ref 95–110)
CHLORIDE SERPL-SCNC: 99 MMOL/L (ref 95–110)
CO2 SERPL-SCNC: 22 MMOL/L (ref 23–29)
CO2 SERPL-SCNC: 23 MMOL/L (ref 23–29)
CREAT SERPL-MCNC: 1 MG/DL (ref 0.5–1.4)
CREAT SERPL-MCNC: 1.2 MG/DL (ref 0.5–1.4)
CRP SERPL-MCNC: 21.1 MG/L (ref 0–8.2)
DIFFERENTIAL METHOD BLD: ABNORMAL
EOSINOPHIL # BLD AUTO: 0.1 K/UL (ref 0–0.5)
EOSINOPHIL NFR BLD: 0.5 % (ref 0–8)
ERYTHROCYTE [DISTWIDTH] IN BLOOD BY AUTOMATED COUNT: 24 % (ref 11.5–14.5)
EST. GFR  (NO RACE VARIABLE): >60 ML/MIN/1.73 M^2
EST. GFR  (NO RACE VARIABLE): >60 ML/MIN/1.73 M^2
GLUCOSE SERPL-MCNC: 128 MG/DL (ref 70–110)
GLUCOSE SERPL-MCNC: 143 MG/DL (ref 70–110)
HCT VFR BLD AUTO: 27.6 % (ref 40–54)
HGB BLD-MCNC: 7.9 G/DL (ref 14–18)
IMM GRANULOCYTES # BLD AUTO: 0.06 K/UL (ref 0–0.04)
IMM GRANULOCYTES NFR BLD AUTO: 0.4 % (ref 0–0.5)
INR PPP: 2.1 (ref 0.8–1.2)
LDH SERPL L TO P-CCNC: 284 U/L (ref 110–260)
LYMPHOCYTES # BLD AUTO: 0.9 K/UL (ref 1–4.8)
LYMPHOCYTES NFR BLD: 6.1 % (ref 18–48)
MAGNESIUM SERPL-MCNC: 1.7 MG/DL (ref 1.6–2.6)
MCH RBC QN AUTO: 18.2 PG (ref 27–31)
MCHC RBC AUTO-ENTMCNC: 28.6 G/DL (ref 32–36)
MCV RBC AUTO: 64 FL (ref 82–98)
MONOCYTES # BLD AUTO: 1.4 K/UL (ref 0.3–1)
MONOCYTES NFR BLD: 9.4 % (ref 4–15)
NEUTROPHILS # BLD AUTO: 12.5 K/UL (ref 1.8–7.7)
NEUTROPHILS NFR BLD: 83.3 % (ref 38–73)
NRBC BLD-RTO: 0 /100 WBC
PHOSPHATE SERPL-MCNC: 3 MG/DL (ref 2.7–4.5)
PLATELET # BLD AUTO: 298 K/UL (ref 150–450)
PMV BLD AUTO: 9.8 FL (ref 9.2–12.9)
POCT GLUCOSE: 143 MG/DL (ref 70–110)
POCT GLUCOSE: 173 MG/DL (ref 70–110)
POCT GLUCOSE: 173 MG/DL (ref 70–110)
POCT GLUCOSE: 216 MG/DL (ref 70–110)
POTASSIUM SERPL-SCNC: 3.2 MMOL/L (ref 3.5–5.1)
POTASSIUM SERPL-SCNC: 4 MMOL/L (ref 3.5–5.1)
PREALB SERPL-MCNC: 9 MG/DL (ref 20–43)
PROT SERPL-MCNC: 8.3 G/DL (ref 6–8.4)
PROTHROMBIN TIME: 21.6 SEC (ref 9–12.5)
RBC # BLD AUTO: 4.34 M/UL (ref 4.6–6.2)
SODIUM SERPL-SCNC: 132 MMOL/L (ref 136–145)
SODIUM SERPL-SCNC: 133 MMOL/L (ref 136–145)
WBC # BLD AUTO: 14.95 K/UL (ref 3.9–12.7)

## 2025-01-01 PROCEDURE — 25000003 PHARM REV CODE 250: Performed by: STUDENT IN AN ORGANIZED HEALTH CARE EDUCATION/TRAINING PROGRAM

## 2025-01-01 PROCEDURE — 93750 INTERROGATION VAD IN PERSON: CPT | Mod: ,,, | Performed by: INTERNAL MEDICINE

## 2025-01-01 PROCEDURE — 20600001 HC STEP DOWN PRIVATE ROOM

## 2025-01-01 PROCEDURE — 63600175 PHARM REV CODE 636 W HCPCS

## 2025-01-01 PROCEDURE — 83615 LACTATE (LD) (LDH) ENZYME: CPT | Performed by: STUDENT IN AN ORGANIZED HEALTH CARE EDUCATION/TRAINING PROGRAM

## 2025-01-01 PROCEDURE — 36415 COLL VENOUS BLD VENIPUNCTURE: CPT | Performed by: STUDENT IN AN ORGANIZED HEALTH CARE EDUCATION/TRAINING PROGRAM

## 2025-01-01 PROCEDURE — 99233 SBSQ HOSP IP/OBS HIGH 50: CPT | Mod: ,,, | Performed by: PHYSICIAN ASSISTANT

## 2025-01-01 PROCEDURE — 84100 ASSAY OF PHOSPHORUS: CPT | Performed by: STUDENT IN AN ORGANIZED HEALTH CARE EDUCATION/TRAINING PROGRAM

## 2025-01-01 PROCEDURE — 80048 BASIC METABOLIC PNL TOTAL CA: CPT | Mod: 91

## 2025-01-01 PROCEDURE — 27000248 HC VAD-ADDITIONAL DAY

## 2025-01-01 PROCEDURE — 63600175 PHARM REV CODE 636 W HCPCS: Performed by: STUDENT IN AN ORGANIZED HEALTH CARE EDUCATION/TRAINING PROGRAM

## 2025-01-01 PROCEDURE — 21400001 HC TELEMETRY ROOM

## 2025-01-01 PROCEDURE — 25000003 PHARM REV CODE 250: Performed by: INTERNAL MEDICINE

## 2025-01-01 PROCEDURE — 86140 C-REACTIVE PROTEIN: CPT | Performed by: STUDENT IN AN ORGANIZED HEALTH CARE EDUCATION/TRAINING PROGRAM

## 2025-01-01 PROCEDURE — 80076 HEPATIC FUNCTION PANEL: CPT | Performed by: STUDENT IN AN ORGANIZED HEALTH CARE EDUCATION/TRAINING PROGRAM

## 2025-01-01 PROCEDURE — 85610 PROTHROMBIN TIME: CPT | Performed by: STUDENT IN AN ORGANIZED HEALTH CARE EDUCATION/TRAINING PROGRAM

## 2025-01-01 PROCEDURE — 25000003 PHARM REV CODE 250

## 2025-01-01 PROCEDURE — 84134 ASSAY OF PREALBUMIN: CPT | Performed by: STUDENT IN AN ORGANIZED HEALTH CARE EDUCATION/TRAINING PROGRAM

## 2025-01-01 PROCEDURE — 83735 ASSAY OF MAGNESIUM: CPT | Performed by: STUDENT IN AN ORGANIZED HEALTH CARE EDUCATION/TRAINING PROGRAM

## 2025-01-01 PROCEDURE — 99232 SBSQ HOSP IP/OBS MODERATE 35: CPT | Mod: ,,,

## 2025-01-01 PROCEDURE — 36415 COLL VENOUS BLD VENIPUNCTURE: CPT

## 2025-01-01 PROCEDURE — 85025 COMPLETE CBC W/AUTO DIFF WBC: CPT | Performed by: STUDENT IN AN ORGANIZED HEALTH CARE EDUCATION/TRAINING PROGRAM

## 2025-01-01 PROCEDURE — 85730 THROMBOPLASTIN TIME PARTIAL: CPT | Performed by: STUDENT IN AN ORGANIZED HEALTH CARE EDUCATION/TRAINING PROGRAM

## 2025-01-01 PROCEDURE — 80048 BASIC METABOLIC PNL TOTAL CA: CPT | Performed by: STUDENT IN AN ORGANIZED HEALTH CARE EDUCATION/TRAINING PROGRAM

## 2025-01-01 RX ORDER — HYDROXYZINE PAMOATE 25 MG/1
25 CAPSULE ORAL ONCE
Status: COMPLETED | OUTPATIENT
Start: 2025-01-01 | End: 2025-01-01

## 2025-01-01 RX ORDER — FUROSEMIDE 10 MG/ML
80 INJECTION INTRAMUSCULAR; INTRAVENOUS ONCE
Status: COMPLETED | OUTPATIENT
Start: 2025-01-01 | End: 2025-01-01

## 2025-01-01 RX ORDER — ONDANSETRON 4 MG/1
4 TABLET, ORALLY DISINTEGRATING ORAL ONCE
Status: COMPLETED | OUTPATIENT
Start: 2025-01-01 | End: 2025-01-01

## 2025-01-01 RX ORDER — ONDANSETRON 4 MG/1
4 TABLET, ORALLY DISINTEGRATING ORAL EVERY 6 HOURS PRN
Status: DISCONTINUED | OUTPATIENT
Start: 2025-01-01 | End: 2025-01-01

## 2025-01-01 RX ORDER — INSULIN GLARGINE 100 [IU]/ML
10 INJECTION, SOLUTION SUBCUTANEOUS DAILY
Status: DISCONTINUED | OUTPATIENT
Start: 2025-01-01 | End: 2025-01-09 | Stop reason: HOSPADM

## 2025-01-01 RX ORDER — HYDROXYZINE PAMOATE 25 MG/1
25 CAPSULE ORAL ONCE
Status: DISCONTINUED | OUTPATIENT
Start: 2025-01-01 | End: 2025-01-01

## 2025-01-01 RX ORDER — POTASSIUM CHLORIDE 20 MEQ/1
20 TABLET, EXTENDED RELEASE ORAL ONCE
Status: COMPLETED | OUTPATIENT
Start: 2025-01-01 | End: 2025-01-01

## 2025-01-01 RX ORDER — DIPHENHYDRAMINE HCL 25 MG
25 CAPSULE ORAL ONCE
Status: COMPLETED | OUTPATIENT
Start: 2025-01-01 | End: 2025-01-01

## 2025-01-01 RX ADMIN — FUROSEMIDE 20 MG/HR: 10 INJECTION, SOLUTION INTRAMUSCULAR; INTRAVENOUS at 01:01

## 2025-01-01 RX ADMIN — POTASSIUM CHLORIDE 20 MEQ: 1500 TABLET, EXTENDED RELEASE ORAL at 08:01

## 2025-01-01 RX ADMIN — DULOXETINE HYDROCHLORIDE 30 MG: 30 CAPSULE, DELAYED RELEASE ORAL at 08:01

## 2025-01-01 RX ADMIN — POTASSIUM CHLORIDE 40 MEQ: 1500 TABLET, EXTENDED RELEASE ORAL at 07:01

## 2025-01-01 RX ADMIN — GABAPENTIN 100 MG: 100 CAPSULE ORAL at 08:01

## 2025-01-01 RX ADMIN — HYDROXYZINE PAMOATE 25 MG: 25 CAPSULE ORAL at 02:01

## 2025-01-01 RX ADMIN — AMLODIPINE BESYLATE 5 MG: 5 TABLET ORAL at 08:01

## 2025-01-01 RX ADMIN — EPLERENONE 50 MG: 25 TABLET, FILM COATED ORAL at 08:01

## 2025-01-01 RX ADMIN — ATORVASTATIN CALCIUM 40 MG: 40 TABLET, FILM COATED ORAL at 08:01

## 2025-01-01 RX ADMIN — POTASSIUM CHLORIDE 40 MEQ: 1500 TABLET, EXTENDED RELEASE ORAL at 09:01

## 2025-01-01 RX ADMIN — INSULIN GLARGINE 10 UNITS: 100 INJECTION, SOLUTION SUBCUTANEOUS at 08:01

## 2025-01-01 RX ADMIN — LEVETIRACETAM 1500 MG: 500 TABLET, FILM COATED ORAL at 09:01

## 2025-01-01 RX ADMIN — CEFADROXIL 500 MG: 500 CAPSULE ORAL at 09:01

## 2025-01-01 RX ADMIN — DIPHENHYDRAMINE HYDROCHLORIDE 25 MG: 25 CAPSULE ORAL at 12:01

## 2025-01-01 RX ADMIN — ONDANSETRON 4 MG: 4 TABLET, ORALLY DISINTEGRATING ORAL at 09:01

## 2025-01-01 RX ADMIN — ACETAMINOPHEN 650 MG: 325 TABLET ORAL at 08:01

## 2025-01-01 RX ADMIN — ASPIRIN 81 MG: 81 TABLET, COATED ORAL at 08:01

## 2025-01-01 RX ADMIN — INSULIN ASPART 1 UNITS: 100 INJECTION, SOLUTION INTRAVENOUS; SUBCUTANEOUS at 09:01

## 2025-01-01 RX ADMIN — AMIODARONE HYDROCHLORIDE 200 MG: 200 TABLET ORAL at 08:01

## 2025-01-01 RX ADMIN — CEFADROXIL 500 MG: 500 CAPSULE ORAL at 08:01

## 2025-01-01 RX ADMIN — LEVETIRACETAM 1500 MG: 500 TABLET, FILM COATED ORAL at 08:01

## 2025-01-01 RX ADMIN — FUROSEMIDE 80 MG: 10 INJECTION, SOLUTION INTRAVENOUS at 01:01

## 2025-01-01 RX ADMIN — INSULIN ASPART 6 UNITS: 100 INJECTION, SOLUTION INTRAVENOUS; SUBCUTANEOUS at 01:01

## 2025-01-01 RX ADMIN — INSULIN ASPART 4 UNITS: 100 INJECTION, SOLUTION INTRAVENOUS; SUBCUTANEOUS at 01:01

## 2025-01-01 RX ADMIN — WARFARIN SODIUM 1.5 MG: 5 TABLET ORAL at 09:01

## 2025-01-01 RX ADMIN — FUROSEMIDE 80 MG: 10 INJECTION, SOLUTION INTRAVENOUS at 06:01

## 2025-01-01 RX ADMIN — GABAPENTIN 400 MG: 400 CAPSULE ORAL at 09:01

## 2025-01-01 RX ADMIN — FUROSEMIDE 80 MG: 10 INJECTION, SOLUTION INTRAVENOUS at 08:01

## 2025-01-01 NOTE — PLAN OF CARE
Problem: Heart Failure  Goal: Optimal Coping  Outcome: Progressing  Intervention: Support Psychosocial Response  Flowsheets (Taken 1/1/2025 0159)  Supportive Measures:   active listening utilized   verbalization of feelings encouraged  Goal: Optimal Cardiac Output  Outcome: Progressing  Intervention: Optimize Cardiac Output  Flowsheets (Taken 1/1/2025 0159)  Environmental Support:   calm environment promoted   rest periods encouraged  Goal: Stable Heart Rate and Rhythm  Outcome: Progressing  Intervention: Monitor and Manage Cardiac Rhythm Effect  Flowsheets (Taken 1/1/2025 0159)  Dysrhythmia Management: other (see comments)  Goal: Fluid and Electrolyte Balance  Outcome: Progressing  Intervention: Monitor and Manage Fluid and Electrolyte Balance  Flowsheets (Taken 1/1/2025 0159)  Fluid/Electrolyte Management: fluids restricted  Goal: Effective Oxygenation and Ventilation  Outcome: Progressing  Intervention: Promote Airway Secretion Clearance  Flowsheets (Taken 1/1/2025 0159)  Activity Management: Ambulated to bathroom - L4

## 2025-01-01 NOTE — PROGRESS NOTES
01/01/25 1206   Vital Signs   BP (!) 94/0   BP Location Left arm   BP Method Doppler   Patient Position Lying     Notified LUCÍA Ayala and DEYA Miller. Pt also w/ no urine output since IV lasix this AM. Orders placed for lasix bolus and drip. Pt updated.

## 2025-01-01 NOTE — NURSING
Latest Reference Range & Units 01/01/25 04:55   Potassium 3.5 - 5.1 mmol/L 3.2 (L)   (L): Data is abnormally low    Notified MD Lucy. PO replacement ordered.

## 2025-01-01 NOTE — PROGRESS NOTES
01/01/25 1522 01/01/25 1530   Vital Signs   BP (!) 98/0 (!) 86/52   MAP (mmHg)  --  64   BP Location Left arm  --    BP Method Doppler  --    Patient Position Lying  --      MD Lucy notified. Pt currently with a weak, palpable pulse to holli radial sites. Will continue to monitor.

## 2025-01-01 NOTE — PROCEDURES
Interrogation of Ventricular assist device was performed with physician analysis of device parameters and review of device function. I have personally reviewed the interrogation findings and agree with findings as stated.   Procedure: Device Interrogation Including analysis of device parameters  Current Settings: Ventricular Assist Device  Review of device function is stable      1/1/2025    12:06 PM 1/1/2025     7:32 AM 1/1/2025     5:00 AM 1/1/2025    12:21 AM 12/31/2024     8:48 PM 12/31/2024     6:11 PM 12/31/2024     2:27 PM   TXP LVAD INTERROGATIONS   Type HeartMate3 HeartMate3 HeartMate3 HeartMate3 HeartMate3 HeartMate3 HeartMate3   Flow 4.4 4 4 4.3 4 3.8 4.3   Speed 5100 5150 5100 5100 5100 5100 5100   PI 4.2 5.5 5.4 4.4 5.5 5.9 4.5   Power (Serna) 3.6 3.5 3.5 3.5 3.6 3.6 3.6   LSL 4700 4700 4700 4700 4700 4700    Pulsatility Intermittent pulse Intermittent pulse Intermittent pulse Intermittent pulse Intermittent pulse Intermittent pulse Intermittent pulse      Natalya Villatoro MD

## 2025-01-01 NOTE — NURSING
Received report from RIAN Ratliff last night. Patient alert and oriented. Complaints of shoulder pain - PRN Tylenol ordered and given with good effect. No sign of distress. Iv line in place- saline locked. Tele monitor in place.LVAd in place - all numbers WDL.On room air. Call bell given on his reach. Instructed to call for any concerns or assistance. Bed on lowest position. Non skid socks on. Plan of care discussed with patient, question answered.

## 2025-01-01 NOTE — PLAN OF CARE
Problem: Ventricular Assist Device  Goal: Optimal Adjustment to Device  Outcome: Progressing  Goal: Absence of Bleeding  Outcome: Progressing  Goal: Absence of Embolism Signs and Symptoms  Outcome: Progressing  Goal: Optimal Blood Flow  Outcome: Progressing  Goal: Absence of Infection Signs and Symptoms  Outcome: Progressing  Goal: Effective Right-Sided Heart Function  Outcome: Progressing     Problem: Adult Inpatient Plan of Care  Goal: Plan of Care Review  Outcome: Progressing  Goal: Patient-Specific Goal (Individualized)  Outcome: Progressing  Goal: Absence of Hospital-Acquired Illness or Injury  Outcome: Progressing  Goal: Optimal Comfort and Wellbeing  Outcome: Progressing  Goal: Readiness for Transition of Care  Outcome: Progressing     Problem: Diabetes Comorbidity  Goal: Blood Glucose Level Within Targeted Range  Outcome: Progressing     Problem: Sepsis/Septic Shock  Goal: Optimal Coping  Outcome: Progressing  Goal: Absence of Bleeding  Outcome: Progressing  Goal: Blood Glucose Level Within Targeted Range  Outcome: Progressing  Goal: Absence of Infection Signs and Symptoms  Outcome: Progressing  Goal: Optimal Nutrition Intake  Outcome: Progressing     Problem: Fall Injury Risk  Goal: Absence of Fall and Fall-Related Injury  Outcome: Progressing     Problem: Heart Failure  Goal: Optimal Coping  Outcome: Progressing  Goal: Optimal Cardiac Output  Outcome: Progressing  Goal: Stable Heart Rate and Rhythm  Outcome: Progressing  Goal: Optimal Functional Ability  Outcome: Progressing  Goal: Fluid and Electrolyte Balance  Outcome: Progressing  Goal: Improved Oral Intake  Outcome: Progressing  Goal: Effective Oxygenation and Ventilation  Outcome: Progressing  Goal: Effective Breathing Pattern During Sleep  Outcome: Progressing      No

## 2025-01-01 NOTE — SUBJECTIVE & OBJECTIVE
Interval History: patient admitted with ADHF and is diuresing on IVP Lasix 80 TID.  He feels bad and is nauseous.  He would benefit from increasing Lasix to lasix gtt but patient is refusing the drip so will continue current dose for now. He is net negative 3L in the last 24 hours.     Continuous Infusions:  Scheduled Meds:   amiodarone  200 mg Oral Daily    amLODIPine  5 mg Oral Daily    aspirin  81 mg Oral Daily    atorvastatin  40 mg Oral Daily    cefadroxil  500 mg Oral Q12H    DULoxetine  30 mg Oral Daily    eplerenone  50 mg Oral Daily    furosemide (LASIX) injection  80 mg Intravenous TID    gabapentin  100 mg Oral Daily    And    gabapentin  400 mg Oral QHS    insulin aspart U-100  6 Units Subcutaneous TIDWM    insulin glargine U-100  10 Units Subcutaneous Daily    levETIRAcetam  1,500 mg Oral BID    potassium chloride  40 mEq Oral BID    warfarin  1.5 mg Oral Daily     PRN Meds:  Current Facility-Administered Medications:     acetaminophen, 650 mg, Oral, Q6H PRN    dextrose 50%, 12.5 g, Intravenous, PRN    dextrose 50%, 25 g, Intravenous, PRN    glucagon (human recombinant), 1 mg, Intramuscular, PRN    glucose, 16 g, Oral, PRN    glucose, 24 g, Oral, PRN    influenza, 0.5 mL, Intramuscular, vaccine x 1 dose    insulin aspart U-100, 0-10 Units, Subcutaneous, QID (AC + HS) PRN    Review of patient's allergies indicates:   Allergen Reactions    Bumex [bumetanide] Hives     Objective:     Vital Signs (Most Recent):  Temp: 97.6 °F (36.4 °C) (01/01/25 1135)  Pulse: 90 (01/01/25 1135)  Resp: 18 (01/01/25 1135)  BP: (!) 90/0 (01/01/25 0732)  SpO2: 98 % (01/01/25 1135) Vital Signs (24h Range):  Temp:  [97.6 °F (36.4 °C)-98.7 °F (37.1 °C)] 97.6 °F (36.4 °C)  Pulse:  [60-93] 90  Resp:  [16-20] 18  SpO2:  [92 %-98 %] 98 %  BP: (72-90)/(0) 90/0     Patient Vitals for the past 72 hrs (Last 3 readings):   Weight   01/01/25 0732 72.3 kg (159 lb 6.3 oz)   12/31/24 0218 79.4 kg (175 lb 0.7 oz)     Body mass index is 19.92  kg/m².      Intake/Output Summary (Last 24 hours) at 1/1/2025 1146  Last data filed at 1/1/2025 0757  Gross per 24 hour   Intake 240 ml   Output 1745 ml   Net -1505 ml       Hemodynamic Parameters:       Telemetry: reviewed        Physical Exam  Vitals and nursing note reviewed.   Constitutional:       Appearance: Normal appearance.   HENT:      Head: Normocephalic.      Mouth/Throat:      Mouth: Mucous membranes are moist.   Eyes:      Pupils: Pupils are equal, round, and reactive to light.   Neck:      Comments: JVP elevation to mid-upper neck   Cardiovascular:      Comments: Smooth VAD hum   Pulmonary:      Effort: Pulmonary effort is normal.   Abdominal:      General: Bowel sounds are normal. There is no distension.      Palpations: Abdomen is soft.   Musculoskeletal:         General: Normal range of motion.      Right lower leg: Edema present.      Left lower leg: Edema present.   Skin:     General: Skin is warm.      Capillary Refill: Capillary refill takes 2 to 3 seconds.   Neurological:      General: No focal deficit present.      Mental Status: He is alert and oriented to person, place, and time.   Psychiatric:         Mood and Affect: Mood normal.         Behavior: Behavior normal.            Significant Labs:  CBC:  Recent Labs   Lab 12/31/24  0339 01/01/25  0455   WBC 11.55 14.95*   RBC 4.35* 4.34*   HGB 7.9* 7.9*   HCT 27.5* 27.6*    298   MCV 63* 64*   MCH 18.2* 18.2*   MCHC 28.7* 28.6*     BNP:  Recent Labs   Lab 12/31/24  0339   *     CMP:  Recent Labs   Lab 12/31/24  0339 01/01/25  0455   * 128*   CALCIUM 8.2* 8.1*   ALBUMIN 2.4* 2.4*   PROT 8.6* 8.3   * 133*   K 3.7 3.2*   CO2 22* 22*   CL 99 98   BUN 16 14   CREATININE 1.1 1.2   ALKPHOS 153* 154*   ALT 7* 7*   AST 38 26   BILITOT 1.8* 1.9*      Coagulation:   Recent Labs   Lab 12/27/24  1221 12/31/24  0618 01/01/25  0455   INR 1.8* 2.0* 2.1*   APTT  --   --  33.6*     LDH:  Recent Labs   Lab 01/01/25  0455   *      Microbiology:  Microbiology Results (last 7 days)       ** No results found for the last 168 hours. **            I have reviewed all pertinent labs within the past 24 hours.    Estimated Creatinine Clearance: 84.5 mL/min (based on SCr of 1.2 mg/dL).    Diagnostic Results:  I have reviewed all pertinent imaging results/findings within the past 24 hours.

## 2025-01-01 NOTE — PLAN OF CARE
Problem: Ventricular Assist Device  Goal: Optimal Adjustment to Device  Outcome: Progressing  Goal: Absence of Bleeding  Outcome: Progressing  Goal: Absence of Embolism Signs and Symptoms  Outcome: Progressing  Goal: Optimal Blood Flow  Outcome: Progressing  Goal: Absence of Infection Signs and Symptoms  Outcome: Progressing  Goal: Effective Right-Sided Heart Function  Outcome: Progressing     Problem: Adult Inpatient Plan of Care  Goal: Plan of Care Review  Outcome: Progressing  Goal: Patient-Specific Goal (Individualized)  Outcome: Progressing  Goal: Absence of Hospital-Acquired Illness or Injury  Outcome: Progressing  Goal: Optimal Comfort and Wellbeing  Outcome: Progressing  Goal: Readiness for Transition of Care  Outcome: Progressing     Problem: Diabetes Comorbidity  Goal: Blood Glucose Level Within Targeted Range  Outcome: Progressing     Problem: Sepsis/Septic Shock  Goal: Optimal Coping  Outcome: Progressing  Goal: Absence of Bleeding  Outcome: Progressing  Goal: Blood Glucose Level Within Targeted Range  Outcome: Progressing  Goal: Absence of Infection Signs and Symptoms  Outcome: Progressing  Goal: Optimal Nutrition Intake  Outcome: Progressing     Problem: Fall Injury Risk  Goal: Absence of Fall and Fall-Related Injury  Outcome: Progressing

## 2025-01-01 NOTE — PLAN OF CARE
Recommendations  --Continue Diabetic Cardiac (Low Na/Chol) Low Sodium diet as tolerated and clinically indicated   --Order Boost Plus TID   --Encourage good intakes   --Nursing: please continue to document % meal eaten on flowsheet   --RD to monitor weight, PO intake     Goals: Meet % EEN/EPN  Nutrition Goal Status: new  Communication of RD Recs:  (POC)

## 2025-01-01 NOTE — ASSESSMENT & PLAN NOTE
Endocrinology consulted for BG management.   BG goal 140-180    - Lantus (Insulin Glargine) 10 units daily (decrease due to improving fasting blood glucose)  - Novolog (Insulin Aspart) 6 units TIDWM. HOLD if patient is NPO, unable to eat, or if Blood Glucose is less than 100 mg/dL.   - [facility] [place]150[place]25[place]  - BG checks AC/HS  - Hypoglycemia protocol in place    ** Please notify Endocrine for any change and/or advance in diet**  ** Please call Endocrine for any BG related issues **    Discharge Planning:   TBD. Please notify endocrinology prior to discharge.

## 2025-01-01 NOTE — CONSULTS
[facility] - Cardiac Int[facility] Care  Adult Nutrition  Consult Note    SUMMARY     Recommendations  --Continue Diabetic Cardiac (Low Na/Chol) Low Sodium diet as tolerated and clinically indicated   --Order Boost Plus TID   --Encourage good intakes   --Nursing: please continue to document % meal eaten on flowsheet   --RD to monitor weight, PO intake     Goals: Meet % EEN/EPN  Nutrition Goal Status: new  Communication of RD Recs:  (POC)    Assessment and Plan    Nutrition Problem  Inadequate energy intake    Related to (etiology):   Decreased appetite, nausea    Signs and Symptoms (as evidenced by):   Estimated intake < estimated requirements     Interventions/Recommendations (treatment strategy):  --Continue Diabetic Cardiac (Low Na/Chol) Low Sodium diet as tolerated and clinically indicated   --Order Boost Plus TID   --Encourage good intakes   --Nursing: please continue to document % meal eaten on flowsheet   --RD to monitor weight, PO intake     Nutrition Diagnosis Status:   New      Reason for Assessment    Reason For Assessment: consult (Nutritional assessment)  Diagnosis: cardiac disease (Acute decompensated heart failure)  General Information Comments: 39 year old male with stage D 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 He underwent RVAD removal and chest closure 6/30/2022. He is off inotropes. He presents with one month hx of dyspnea and abdominal distention. RD consult for nutritional assessment. Spoke to pt at bedside. Pt reports not feeling well today and has not eaten anything d/t nausea. Pt reports appetite and PO intake was good at home. Pt denies weight loss.    Nutrition Discharge Planning: Adequate nutritional intake    Nutrition Related Social  "Determinants of Health: SDOH: Adequate food in home environment    Nutrition/Diet History    Spiritual, Cultural Beliefs, Spiritism Practices, Values that Affect Care: no  Food Allergies: NKFA    Anthropometrics    Temp: 97.6 °F (36.4 °C)  Height Method: Stated  Height: 6' 3" (190.5 cm)  Height (inches): 75 in  Weight Method: Standard Scale  Weight: 72.3 kg (159 lb 6.3 oz)  Weight (lb): 159.39 lb  Ideal Body Weight (IBW), Male: 196 lb  % Ideal Body Weight, Male (lb): 81.32 %  BMI (Calculated): 19.9  BMI Grade: 18.5-24.9 - normal       Lab/Procedures/Meds    Pertinent Labs Reviewed: reviewed - hemoglobin 7.9, hematocrit 27.6, MCV 64, MCH 18.2, MCHC 28.6, Fe 19, Na 133, K 3.2, glucose 128, Ca 8.1, ALT 7, CRP 21.1     Pertinent Medications Reviewed: reviewed - aspirin, atorvastatin, insulin, ondansetron, potassium chloride, warfarin    Estimated/Assessed Needs    Weight Used For Calorie Calculations: 72.3 kg (159 lb 6.3 oz)  Energy Calorie Requirements (kcal): 5016-8258 kcal/day (25-30 kcal/kg)  Energy Need Method: Kcal/kg  Protein Requirements: 72-97 g/day (1.0-1.2 g/kg)  Weight Used For Protein Calculations: 72.3 kg (159 lb 6.3 oz)     Estimated Fluid Requirement Method: RDA Method  RDA Method (mL): 1808  CHO Requirement: 226-271 g/day      Nutrition Prescription Ordered    Current Diet Order: Diabetic Cardiac (Low Na/Chol)    Evaluation of Received Nutrient/Fluid Intake    Comments: 1/1  % Intake of Estimated Energy Needs: 0 - 25 %  % Meal Intake: 0 - 25 %    Nutrition Risk    Level of Risk/Frequency of Follow-up: high       Monitor and Evaluation    Food and Nutrient Intake: energy intake, food and beverage intake  Food and Nutrient Adminstration: diet order  Knowledge/Beliefs/Attitudes: food and nutrition knowledge/skill  Physical Activity and Function: nutrition-related ADLs and IADLs  Anthropometric Measurements: weight, weight change, body mass index  Biochemical Data, Medical Tests and Procedures: electrolyte " and renal panel, gastrointestinal profile, glucose/endocrine profile, inflammatory profile, lipid profile  Nutrition-Focused Physical Findings: overall appearance       Nutrition Follow-Up    RD Follow-up?: Yes    Yumiko Rothman, Registration Eligible, Provisional LDN

## 2025-01-01 NOTE — PROGRESS NOTES
01/01/2025  Ruma Li    Current provider:  Natalya Villatoro MD    Device interrogation:      1/1/2025     7:32 AM 1/1/2025     5:00 AM 1/1/2025    12:21 AM 12/31/2024     8:48 PM 12/31/2024     6:11 PM 12/31/2024     2:27 PM 12/31/2024     3:13 AM   TXP LVAD INTERROGATIONS   Type HeartMate3 HeartMate3 HeartMate3 HeartMate3 HeartMate3 HeartMate3 HeartMate3   Flow 4 4 4.3 4 3.8 4.3 51   Speed 5150 5100 5100 5100 5100 5100 51   PI 5.5 5.4 4.4 5.5 5.9 4.5 3.7   Power (Serna) 3.5 3.5 3.5 3.6 3.6 3.6 3.6   LSL 4700 4700 4700 4700 4700     Pulsatility Intermittent pulse Intermittent pulse Intermittent pulse Intermittent pulse Intermittent pulse Intermittent pulse No Pulse          Rounded on Kevan Queen to ensure all mechanical assist device settings (IABP or VAD) were appropriate and all parameters were within limits.  I was able to ensure all back up equipment was present, the staff had no issues, and the Perfusion Department daily rounding was complete.      For implantable VADs: Interrogation of Ventricular assist device was performed with analysis of device parameters and review of device function. I have personally reviewed the interrogation findings and agree with findings as stated.     In emergency, the nursing units have been notified to contact the perfusion department either by:  Calling [phone] from 630am to 4pm Mon thru Fri, utilizing the On-Call Finder functionality of Epic and searching for Perfusion, or by contacting the hospital  from 4pm to 630am and on weekends and asking to speak with the perfusionist on call.    10:48 AM

## 2025-01-01 NOTE — SUBJECTIVE & OBJECTIVE
"Interval HPI:   No acute events overnight. Patient in room [id number] A. Blood glucose improving. BG at and above goal on current insulin regimen (SSI, prandial, and basal insulin ). Steroid use- None.    Renal function- Normal   Lab Results   Component Value Date    CREATININE 1.2 01/01/2025     Vasopressors-  None     Endocrine will continue to follow and manage insulin orders inpatient.     Diet diabetic Cardiac (Low Na/Chol), Low Sodium,2gm; 2000 Calories (up to 75 gm per meal); Fluid - 1500mL     Eating:   <25%  Nausea: Yes  Hypoglycemia and intervention: No  Fever: No  TPN and/or TF: No  If yes, type of TF/TPN and rate: N/A    BP (!) 90/0 (BP Location: Right arm, Patient Position: Lying)   Pulse 60   Temp 98.7 °F (37.1 °C) (Oral)   Resp 18   Ht 6' 3" (1.905 m)   Wt 79.4 kg (175 lb 0.7 oz)   SpO2 97%   BMI 21.88 kg/m²     Labs Reviewed and Include    Recent Labs   Lab 01/01/25  0455   *   CALCIUM 8.1*   ALBUMIN 2.4*   PROT 8.3   *   K 3.2*   CO2 22*   CL 98   BUN 14   CREATININE 1.2   ALKPHOS 154*   ALT 7*   AST 26   BILITOT 1.9*     Lab Results   Component Value Date    WBC 14.95 (H) 01/01/2025    HGB 7.9 (L) 01/01/2025    HCT 27.6 (L) 01/01/2025    MCV 64 (L) 01/01/2025     01/01/2025     No results for input(s): "TSH", "FREET4" in the last 168 hours.  Lab Results   Component Value Date    HGBA1C 10.6 (H) 12/31/2024       Nutritional status:   Body mass index is 21.88 kg/m².  Lab Results   Component Value Date    ALBUMIN 2.4 (L) 01/01/2025    ALBUMIN 2.4 (L) 12/31/2024    ALBUMIN 2.4 (L) 12/08/2024     Lab Results   Component Value Date    PREALBUMIN 9 (L) 01/01/2025    PREALBUMIN 12 (L) 11/21/2024    PREALBUMIN 14 (L) 09/27/2024       Estimated Creatinine Clearance: 92.8 mL/min (based on SCr of 1.2 mg/dL).    Accu-Checks  Recent Labs     12/31/24  0618 12/31/24  1756 12/31/24 2045 01/01/25  0614   POCTGLUCOSE 361* 183* 311* 143*       Current Medications and/or Treatments " Impacting Glycemic Control  Immunotherapy:    Immunosuppressants       None          Steroids:   Hormones (From admission, onward)      None          Pressors:    Autonomic Drugs (From admission, onward)      None          Hyperglycemia/Diabetes Medications:   Antihyperglycemics (From admission, onward)      Start     Stop Route Frequency Ordered    01/01/25 0900  insulin glargine U-100 (Lantus) pen 10 Units         -- SubQ Daily 01/01/25 0813    12/31/24 1645  insulin aspart U-100 pen 6 Units         -- SubQ 3 times daily with meals 12/31/24 1426    12/31/24 1525  insulin aspart U-100 pen 0-10 Units         -- SubQ Before meals & nightly PRN 12/31/24 1426

## 2025-01-01 NOTE — PROGRESS NOTES
"[facility] - Cardiac Intensive Care  Endocrinology  Progress Note    Admit Date: 12/31/2024     Reason for Consult: Management of T2DM, Hyperglycemia     Surgical Procedure and Date: LVAD 06/29/2022      Diabetes diagnosis year: 2022    Lab Results   Component Value Date    HGBA1C 10.3 (H) 08/25/2024       Home Diabetes Medications:  Levemir 20 units twice daily and Novolog 10 or 18 units with meals per patient    How often checking glucose at home? 3-4x day   BG readings on regimen: 150-200s  Hypoglycemia on the regimen?  Yes, at least once weekly; experiences blurred vision  Missed doses on regimen?  Yes, he has been out of Levemir "for a while" since it has been discontinued    Diabetes Complications include:   Hyperglycemia     Complicating diabetes co morbidities:   History of MI, CHF, and CKD      HPI:   Patient is a 39 y.o. 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 He underwent RVAD removal and chest closure 6/30/2022. He presented with one month hx of dyspnea and abdominal distention. He reported bilateral LE edema which prompted him to come to the ED with the assistance of his family as they drove from [place]. He reported intermittent chest pain with coughing. Endo consulted for BG management.            Interval HPI:   No acute events overnight. Patient in room [id number] A. Blood glucose improving. BG at and above goal on current insulin regimen (SSI, prandial, and basal insulin ). Steroid use- None.    Renal function- Normal   Lab Results   Component Value Date    CREATININE 1.2 01/01/2025     Vasopressors-  None     Endocrine will continue to follow and manage insulin orders inpatient.     Diet diabetic Cardiac (Low Na/Chol), Low " "Sodium,2gm; 2000 Calories (up to 75 gm per meal); Fluid - 1500mL     Eating:   <25%  Nausea: Yes  Hypoglycemia and intervention: No  Fever: No  TPN and/or TF: No  If yes, type of TF/TPN and rate: N/A    BP (!) 90/0 (BP Location: Right arm, Patient Position: Lying)   Pulse 60   Temp 98.7 °F (37.1 °C) (Oral)   Resp 18   Ht 6' 3" (1.905 m)   Wt 79.4 kg (175 lb 0.7 oz)   SpO2 97%   BMI 21.88 kg/m²     Labs Reviewed and Include    Recent Labs   Lab 01/01/25  0455   *   CALCIUM 8.1*   ALBUMIN 2.4*   PROT 8.3   *   K 3.2*   CO2 22*   CL 98   BUN 14   CREATININE 1.2   ALKPHOS 154*   ALT 7*   AST 26   BILITOT 1.9*     Lab Results   Component Value Date    WBC 14.95 (H) 01/01/2025    HGB 7.9 (L) 01/01/2025    HCT 27.6 (L) 01/01/2025    MCV 64 (L) 01/01/2025     01/01/2025     No results for input(s): "TSH", "FREET4" in the last 168 hours.  Lab Results   Component Value Date    HGBA1C 10.6 (H) 12/31/2024       Nutritional status:   Body mass index is 21.88 kg/m².  Lab Results   Component Value Date    ALBUMIN 2.4 (L) 01/01/2025    ALBUMIN 2.4 (L) 12/31/2024    ALBUMIN 2.4 (L) 12/08/2024     Lab Results   Component Value Date    PREALBUMIN 9 (L) 01/01/2025    PREALBUMIN 12 (L) 11/21/2024    PREALBUMIN 14 (L) 09/27/2024       Estimated Creatinine Clearance: 92.8 mL/min (based on SCr of 1.2 mg/dL).    Accu-Checks  Recent Labs     12/31/24  0618 12/31/24  1756 12/31/24 2045 01/01/25  0614   POCTGLUCOSE 361* 183* 311* 143*       Current Medications and/or Treatments Impacting Glycemic Control  Immunotherapy:    Immunosuppressants       None          Steroids:   Hormones (From admission, onward)      None          Pressors:    Autonomic Drugs (From admission, onward)      None          Hyperglycemia/Diabetes Medications:   Antihyperglycemics (From admission, onward)      Start     Stop Route Frequency Ordered    01/01/25 0900  insulin glargine U-100 (Lantus) pen 10 Units         -- SubQ Daily 01/01/25 " 0813    12/31/24 1645  insulin aspart U-100 pen 6 Units         -- SubQ 3 times daily with meals 12/31/24 1426    12/31/24 1525  insulin aspart U-100 pen 0-10 Units         -- SubQ Before meals & nightly PRN 12/31/24 1426            ASSESSMENT and PLAN    Cardiac/Vascular  * Acute decompensated heart failure  Managed by primary team  Optimize blood glucose control        HTN (hypertension)  Uncontrolled HTN can worsen insulin resistance.         LVAD (left ventricular assist device) present  Managed by primary team      Endocrine  Type 2 diabetes mellitus with hyperglycemia, with long-term current use of insulin  Endocrinology consulted for BG management.   BG goal 140-180    - Lantus (Insulin Glargine) 10 units daily (decrease due to improving fasting blood glucose)  - Novolog (Insulin Aspart) 6 units TIDWM. HOLD if patient is NPO, unable to eat, or if Blood Glucose is less than 100 mg/dL.   - [facility] [place] SSI (150/25)  - BG checks AC/HS  - Hypoglycemia protocol in place    ** Please notify Endocrine for any change and/or advance in diet**  ** Please call Endocrine for any BG related issues **    Discharge Planning:   TBD. Please notify endocrinology prior to discharge.            Guera De Oliveira PA-C  Endocrinology  [facility] - Cardiac Intensive [facility]

## 2025-01-02 ENCOUNTER — PATIENT MESSAGE (OUTPATIENT)
Dept: TRANSPLANT | Facility: CLINIC | Age: 40
End: 2025-01-02
Payer: MEDICAID

## 2025-01-02 LAB
ANION GAP SERPL CALC-SCNC: 12 MMOL/L (ref 8–16)
ANION GAP SERPL CALC-SCNC: 13 MMOL/L (ref 8–16)
APTT PPP: 33.1 SEC (ref 21–32)
BASOPHILS # BLD AUTO: 0.06 K/UL (ref 0–0.2)
BASOPHILS NFR BLD: 0.7 % (ref 0–1.9)
BUN SERPL-MCNC: 18 MG/DL (ref 6–20)
BUN SERPL-MCNC: 20 MG/DL (ref 6–20)
CALCIUM SERPL-MCNC: 8.4 MG/DL (ref 8.7–10.5)
CALCIUM SERPL-MCNC: 8.5 MG/DL (ref 8.7–10.5)
CHLORIDE SERPL-SCNC: 99 MMOL/L (ref 95–110)
CHLORIDE SERPL-SCNC: 99 MMOL/L (ref 95–110)
CO2 SERPL-SCNC: 18 MMOL/L (ref 23–29)
CO2 SERPL-SCNC: 23 MMOL/L (ref 23–29)
CREAT SERPL-MCNC: 1 MG/DL (ref 0.5–1.4)
CREAT SERPL-MCNC: 1.2 MG/DL (ref 0.5–1.4)
DIFFERENTIAL METHOD BLD: ABNORMAL
EOSINOPHIL # BLD AUTO: 0 K/UL (ref 0–0.5)
EOSINOPHIL NFR BLD: 0.3 % (ref 0–8)
ERYTHROCYTE [DISTWIDTH] IN BLOOD BY AUTOMATED COUNT: 24.6 % (ref 11.5–14.5)
EST. GFR  (NO RACE VARIABLE): >60 ML/MIN/1.73 M^2
EST. GFR  (NO RACE VARIABLE): >60 ML/MIN/1.73 M^2
GLUCOSE SERPL-MCNC: 140 MG/DL (ref 70–110)
GLUCOSE SERPL-MCNC: 164 MG/DL (ref 70–110)
HCT VFR BLD AUTO: 28.9 % (ref 40–54)
HGB BLD-MCNC: 8.2 G/DL (ref 14–18)
IMM GRANULOCYTES # BLD AUTO: 0.03 K/UL (ref 0–0.04)
IMM GRANULOCYTES NFR BLD AUTO: 0.3 % (ref 0–0.5)
INR PPP: 2.1 (ref 0.8–1.2)
LDH SERPL L TO P-CCNC: 294 U/L (ref 110–260)
LYMPHOCYTES # BLD AUTO: 0.8 K/UL (ref 1–4.8)
LYMPHOCYTES NFR BLD: 9.1 % (ref 18–48)
MAGNESIUM SERPL-MCNC: 1.8 MG/DL (ref 1.6–2.6)
MCH RBC QN AUTO: 18.2 PG (ref 27–31)
MCHC RBC AUTO-ENTMCNC: 28.4 G/DL (ref 32–36)
MCV RBC AUTO: 64 FL (ref 82–98)
MONOCYTES # BLD AUTO: 0.8 K/UL (ref 0.3–1)
MONOCYTES NFR BLD: 8.5 % (ref 4–15)
NEUTROPHILS # BLD AUTO: 7.5 K/UL (ref 1.8–7.7)
NEUTROPHILS NFR BLD: 81.1 % (ref 38–73)
NRBC BLD-RTO: 0 /100 WBC
PHOSPHATE SERPL-MCNC: 3.4 MG/DL (ref 2.7–4.5)
PLATELET # BLD AUTO: 362 K/UL (ref 150–450)
PMV BLD AUTO: 9.8 FL (ref 9.2–12.9)
POCT GLUCOSE: 166 MG/DL (ref 70–110)
POCT GLUCOSE: 172 MG/DL (ref 70–110)
POCT GLUCOSE: 180 MG/DL (ref 70–110)
POCT GLUCOSE: 198 MG/DL (ref 70–110)
POTASSIUM SERPL-SCNC: 3.9 MMOL/L (ref 3.5–5.1)
POTASSIUM SERPL-SCNC: 4.8 MMOL/L (ref 3.5–5.1)
PROTHROMBIN TIME: 21.9 SEC (ref 9–12.5)
RBC # BLD AUTO: 4.5 M/UL (ref 4.6–6.2)
SODIUM SERPL-SCNC: 130 MMOL/L (ref 136–145)
SODIUM SERPL-SCNC: 134 MMOL/L (ref 136–145)
WBC # BLD AUTO: 9.2 K/UL (ref 3.9–12.7)

## 2025-01-02 PROCEDURE — 36415 COLL VENOUS BLD VENIPUNCTURE: CPT | Performed by: STUDENT IN AN ORGANIZED HEALTH CARE EDUCATION/TRAINING PROGRAM

## 2025-01-02 PROCEDURE — 85730 THROMBOPLASTIN TIME PARTIAL: CPT | Performed by: STUDENT IN AN ORGANIZED HEALTH CARE EDUCATION/TRAINING PROGRAM

## 2025-01-02 PROCEDURE — 80048 BASIC METABOLIC PNL TOTAL CA: CPT

## 2025-01-02 PROCEDURE — 83615 LACTATE (LD) (LDH) ENZYME: CPT | Performed by: STUDENT IN AN ORGANIZED HEALTH CARE EDUCATION/TRAINING PROGRAM

## 2025-01-02 PROCEDURE — 85025 COMPLETE CBC W/AUTO DIFF WBC: CPT | Performed by: STUDENT IN AN ORGANIZED HEALTH CARE EDUCATION/TRAINING PROGRAM

## 2025-01-02 PROCEDURE — 20600001 HC STEP DOWN PRIVATE ROOM

## 2025-01-02 PROCEDURE — 21400001 HC TELEMETRY ROOM

## 2025-01-02 PROCEDURE — 25000003 PHARM REV CODE 250: Performed by: STUDENT IN AN ORGANIZED HEALTH CARE EDUCATION/TRAINING PROGRAM

## 2025-01-02 PROCEDURE — 85610 PROTHROMBIN TIME: CPT | Performed by: STUDENT IN AN ORGANIZED HEALTH CARE EDUCATION/TRAINING PROGRAM

## 2025-01-02 PROCEDURE — 84100 ASSAY OF PHOSPHORUS: CPT | Performed by: STUDENT IN AN ORGANIZED HEALTH CARE EDUCATION/TRAINING PROGRAM

## 2025-01-02 PROCEDURE — 93750 INTERROGATION VAD IN PERSON: CPT | Mod: ,,, | Performed by: INTERNAL MEDICINE

## 2025-01-02 PROCEDURE — 25000003 PHARM REV CODE 250: Performed by: INTERNAL MEDICINE

## 2025-01-02 PROCEDURE — 99232 SBSQ HOSP IP/OBS MODERATE 35: CPT | Mod: ,,,

## 2025-01-02 PROCEDURE — 99233 SBSQ HOSP IP/OBS HIGH 50: CPT | Mod: ,,, | Performed by: PHYSICIAN ASSISTANT

## 2025-01-02 PROCEDURE — 36415 COLL VENOUS BLD VENIPUNCTURE: CPT | Mod: XB

## 2025-01-02 PROCEDURE — 27000248 HC VAD-ADDITIONAL DAY

## 2025-01-02 PROCEDURE — 63600175 PHARM REV CODE 636 W HCPCS

## 2025-01-02 PROCEDURE — 83735 ASSAY OF MAGNESIUM: CPT | Performed by: STUDENT IN AN ORGANIZED HEALTH CARE EDUCATION/TRAINING PROGRAM

## 2025-01-02 PROCEDURE — 97530 THERAPEUTIC ACTIVITIES: CPT

## 2025-01-02 RX ORDER — INSULIN ASPART 100 [IU]/ML
6-9 INJECTION, SOLUTION INTRAVENOUS; SUBCUTANEOUS
Status: DISCONTINUED | OUTPATIENT
Start: 2025-01-02 | End: 2025-01-03

## 2025-01-02 RX ORDER — HYDROXYZINE HYDROCHLORIDE 25 MG/1
25 TABLET, FILM COATED ORAL ONCE
Status: COMPLETED | OUTPATIENT
Start: 2025-01-02 | End: 2025-01-02

## 2025-01-02 RX ORDER — HYDRALAZINE HYDROCHLORIDE 10 MG/1
10 TABLET, FILM COATED ORAL ONCE
Status: COMPLETED | OUTPATIENT
Start: 2025-01-02 | End: 2025-01-02

## 2025-01-02 RX ADMIN — INSULIN ASPART 1 UNITS: 100 INJECTION, SOLUTION INTRAVENOUS; SUBCUTANEOUS at 09:01

## 2025-01-02 RX ADMIN — AMIODARONE HYDROCHLORIDE 200 MG: 200 TABLET ORAL at 09:01

## 2025-01-02 RX ADMIN — INSULIN ASPART 2 UNITS: 100 INJECTION, SOLUTION INTRAVENOUS; SUBCUTANEOUS at 09:01

## 2025-01-02 RX ADMIN — POTASSIUM CHLORIDE 40 MEQ: 1500 TABLET, EXTENDED RELEASE ORAL at 08:01

## 2025-01-02 RX ADMIN — FUROSEMIDE 40 MG/HR: 10 INJECTION, SOLUTION INTRAMUSCULAR; INTRAVENOUS at 01:01

## 2025-01-02 RX ADMIN — LEVETIRACETAM 1500 MG: 500 TABLET, FILM COATED ORAL at 09:01

## 2025-01-02 RX ADMIN — WARFARIN SODIUM 1.5 MG: 5 TABLET ORAL at 04:01

## 2025-01-02 RX ADMIN — POTASSIUM CHLORIDE 40 MEQ: 1500 TABLET, EXTENDED RELEASE ORAL at 09:01

## 2025-01-02 RX ADMIN — ATORVASTATIN CALCIUM 40 MG: 40 TABLET, FILM COATED ORAL at 09:01

## 2025-01-02 RX ADMIN — HYDRALAZINE HYDROCHLORIDE 10 MG: 10 TABLET ORAL at 05:01

## 2025-01-02 RX ADMIN — DULOXETINE HYDROCHLORIDE 30 MG: 30 CAPSULE, DELAYED RELEASE ORAL at 09:01

## 2025-01-02 RX ADMIN — INSULIN ASPART 2 UNITS: 100 INJECTION, SOLUTION INTRAVENOUS; SUBCUTANEOUS at 12:01

## 2025-01-02 RX ADMIN — ACETAMINOPHEN 650 MG: 325 TABLET ORAL at 08:01

## 2025-01-02 RX ADMIN — INSULIN ASPART 6 UNITS: 100 INJECTION, SOLUTION INTRAVENOUS; SUBCUTANEOUS at 09:01

## 2025-01-02 RX ADMIN — INSULIN GLARGINE 10 UNITS: 100 INJECTION, SOLUTION SUBCUTANEOUS at 09:01

## 2025-01-02 RX ADMIN — EPLERENONE 50 MG: 25 TABLET, FILM COATED ORAL at 09:01

## 2025-01-02 RX ADMIN — ASPIRIN 81 MG: 81 TABLET, COATED ORAL at 09:01

## 2025-01-02 RX ADMIN — INSULIN ASPART 2 UNITS: 100 INJECTION, SOLUTION INTRAVENOUS; SUBCUTANEOUS at 04:01

## 2025-01-02 RX ADMIN — HYDROXYZINE HYDROCHLORIDE 25 MG: 25 TABLET ORAL at 10:01

## 2025-01-02 RX ADMIN — INSULIN ASPART 9 UNITS: 100 INJECTION, SOLUTION INTRAVENOUS; SUBCUTANEOUS at 04:01

## 2025-01-02 RX ADMIN — GABAPENTIN 100 MG: 100 CAPSULE ORAL at 09:01

## 2025-01-02 RX ADMIN — CEFADROXIL 500 MG: 500 CAPSULE ORAL at 09:01

## 2025-01-02 RX ADMIN — FUROSEMIDE 40 MG/HR: 10 INJECTION, SOLUTION INTRAMUSCULAR; INTRAVENOUS at 02:01

## 2025-01-02 RX ADMIN — AMLODIPINE BESYLATE 5 MG: 5 TABLET ORAL at 09:01

## 2025-01-02 RX ADMIN — LEVETIRACETAM 1500 MG: 500 TABLET, FILM COATED ORAL at 08:01

## 2025-01-02 RX ADMIN — INSULIN ASPART 6 UNITS: 100 INJECTION, SOLUTION INTRAVENOUS; SUBCUTANEOUS at 12:01

## 2025-01-02 RX ADMIN — CEFADROXIL 500 MG: 500 CAPSULE ORAL at 08:01

## 2025-01-02 RX ADMIN — GABAPENTIN 400 MG: 400 CAPSULE ORAL at 08:01

## 2025-01-02 NOTE — PROGRESS NOTES
01/02/25 0536 01/02/25 0616 01/02/25 0645   Vital Signs   Pulse  --  89  --    /73  --  (!) 88/0   MAP (mmHg) 91  --   --    BP Location Right arm  --  Right arm   BP Method Doppler  --  Doppler   Patient Position Sitting  --  Sitting

## 2025-01-02 NOTE — SUBJECTIVE & OBJECTIVE
"Interval HPI:   No acute events overnight. Patient in room [id number] A. Blood glucose stable. BG at and above goal on current insulin regimen (SSI, prandial, and basal insulin ). Steroid use- None.    Renal function- Normal   Lab Results   Component Value Date    CREATININE 1.0 2025     Vasopressors-  None     Endocrine will continue to follow and manage insulin orders inpatient.     Diet diabetic Cardiac (Low Na/Chol), Low Sodium,2gm; 2000 Calories (up to 75 gm per meal); Fluid - 1500mL     Eatin%  Nausea: No  Hypoglycemia and intervention: No  Fever: No  TPN and/or TF: No  If yes, type of TF/TPN and rate: N/A    BP (!) 88/0 (BP Location: Right arm, Patient Position: Sitting)   Pulse 89   Temp 97 °F (36.1 °C) (Oral)   Resp 18   Ht 6' 3" (1.905 m)   Wt 82.5 kg (181 lb 14.1 oz)   SpO2 96%   BMI 22.73 kg/m²     Labs Reviewed and Include    Recent Labs   Lab 25  1746   *   CALCIUM 8.6*   *   K 4.0   CO2 23   CL 99   BUN 16   CREATININE 1.0     Lab Results   Component Value Date    WBC 9.20 2025    HGB 8.2 (L) 2025    HCT 28.9 (L) 2025    MCV 64 (L) 2025     2025     No results for input(s): "TSH", "FREET4" in the last 168 hours.  Lab Results   Component Value Date    HGBA1C 10.6 (H) 2024       Nutritional status:   Body mass index is 22.73 kg/m².  Lab Results   Component Value Date    ALBUMIN 2.4 (L) 2025    ALBUMIN 2.4 (L) 2024    ALBUMIN 2.4 (L) 2024     Lab Results   Component Value Date    PREALBUMIN 9 (L) 2025    PREALBUMIN 12 (L) 2024    PREALBUMIN 14 (L) 2024       Estimated Creatinine Clearance: 115.7 mL/min (based on SCr of 1 mg/dL).    Accu-Checks  Recent Labs     24  0618 24  1756 24  2045 25  0614 25  1256 25  1737 25   POCTGLUCOSE 361* 183* 311* 143* 216* 173* 173*       Current Medications and/or Treatments Impacting Glycemic " Control  Immunotherapy:    Immunosuppressants       None          Steroids:   Hormones (From admission, onward)      None          Pressors:    Autonomic Drugs (From admission, onward)      None          Hyperglycemia/Diabetes Medications:   Antihyperglycemics (From admission, onward)      Start     Stop Route Frequency Ordered    01/01/25 0900  insulin glargine U-100 (Lantus) pen 10 Units         -- SubQ Daily 01/01/25 0813    12/31/24 1645  insulin aspart U-100 pen 6 Units         -- SubQ 3 times daily with meals 12/31/24 1426    12/31/24 1525  insulin aspart U-100 pen 0-10 Units         -- SubQ Before meals & nightly PRN 12/31/24 1426

## 2025-01-02 NOTE — ASSESSMENT & PLAN NOTE
Endocrinology consulted for BG management.   BG goal 140-180    - Lantus (Insulin Glargine) 10 units daily   - Novolog (Insulin Aspart) 6-9 units TIDWM. Administer 6 units if patient eats 25% -  50% and 9 units if patient eats 50% or more.  HOLD if patient is NPO, unable to eat, or if Blood Glucose is less than 100 mg/dL.   - [facility] SSI (150/25)  - BG checks AC/HS  - Hypoglycemia protocol in place    ** Please notify Endocrine for any change and/or advance in diet**  ** Please call Endocrine for any BG related issues **    Discharge Planning:   TBD. Please notify endocrinology prior to discharge.

## 2025-01-02 NOTE — PT/OT/SLP PROGRESS
Physical Therapy Discharge    Patient Name:  Kevan Queen   MRN:  [id number]  Admitting Diagnosis:  Acute decompensated heart failure   Recent Surgery: * No surgery found *    Admit Date: 12/31/2024  Length of Stay: 2 days    Pt found resting with HOB elevated upon attempt at 1248. Pt well known to therapy services and is s/p LVAD implant 6/29. Pt reporting he has no further concerns with mobility and has been ambulating independently throughout the day. He declines need for practicing stairs in prep for d/c as he has no difficulty with them and reports no concern for completing them upon d/c. All questions answered within PT scope of practice. PT provided education to pt regarding importance of maintaining frequent activity and ambulating while admitted to maintain current level of mobility. Pt does not require further acute skilled therapy intervention and pt agreeable to d/c from PT services. Please re-consult if pt experiences a change in status.    1/2/2025

## 2025-01-02 NOTE — PLAN OF CARE
Pt maintained free from falls/trauma/injuries and skin breakdown. Pt denied pain or discomfort. Doppler was elevated overnight. Administered Po one time dose of hydralazine. Plan of care reviewed. Pt verbalized understanding. All questions and concerns addressed.     Problem: Ventricular Assist Device  Goal: Optimal Adjustment to Device  Outcome: Not Progressing  Goal: Absence of Bleeding  Outcome: Not Progressing  Goal: Absence of Embolism Signs and Symptoms  Outcome: Not Progressing  Goal: Optimal Blood Flow  Outcome: Not Progressing  Goal: Absence of Infection Signs and Symptoms  Outcome: Not Progressing  Goal: Effective Right-Sided Heart Function  Outcome: Not Progressing     Problem: Adult Inpatient Plan of Care  Goal: Plan of Care Review  Outcome: Not Progressing  Goal: Patient-Specific Goal (Individualized)  Outcome: Not Progressing  Goal: Absence of Hospital-Acquired Illness or Injury  Outcome: Not Progressing  Goal: Optimal Comfort and Wellbeing  Outcome: Not Progressing  Goal: Readiness for Transition of Care  Outcome: Not Progressing     Problem: Diabetes Comorbidity  Goal: Blood Glucose Level Within Targeted Range  Outcome: Not Progressing     Problem: Sepsis/Septic Shock  Goal: Optimal Coping  Outcome: Not Progressing  Goal: Absence of Bleeding  Outcome: Not Progressing  Goal: Blood Glucose Level Within Targeted Range  Outcome: Not Progressing  Goal: Absence of Infection Signs and Symptoms  Outcome: Not Progressing  Goal: Optimal Nutrition Intake  Outcome: Not Progressing     Problem: Fall Injury Risk  Goal: Absence of Fall and Fall-Related Injury  Outcome: Not Progressing     Problem: Heart Failure  Goal: Optimal Coping  Outcome: Not Progressing  Goal: Optimal Cardiac Output  Outcome: Not Progressing  Goal: Stable Heart Rate and Rhythm  Outcome: Not Progressing  Goal: Optimal Functional Ability  Outcome: Not Progressing  Goal: Fluid and Electrolyte Balance  Outcome: Not Progressing  Goal: Improved Oral  Intake  Outcome: Not Progressing  Goal: Effective Oxygenation and Ventilation  Outcome: Not Progressing  Goal: Effective Breathing Pattern During Sleep  Outcome: Not Progressing

## 2025-01-02 NOTE — NURSING
LVAD dressing change completed using sterile technique with w daily kit. DLES is a 2 with minimal dry dark with no odor. drainage noted on the drain sponge. Tolerated without any complication. no redness, or tenderness noted.

## 2025-01-02 NOTE — PROGRESS NOTES
01/02/2025  Enrique NANDO Awan Jr    Current provider:  Natalya Villatoro MD    Device interrogation:      1/2/2025     8:55 AM 1/2/2025     5:45 AM 1/1/2025    11:37 PM 1/1/2025     8:15 PM 1/1/2025     3:49 PM 1/1/2025    12:06 PM 1/1/2025     7:32 AM   TXP LVAD INTERROGATIONS   Type HeartMate3 HeartMate3 HeartMate3 HeartMate3 HeartMate3 HeartMate3 HeartMate3   Flow 4.1 3.9 3.9 4 3.9 4.4 4   Speed 5100 5100 5100 5100 5100 5100 5150   PI 5 6.4 6 5.7 5.7 4.2 5.5   Power (Serna) 3.6 3.6 3.6 3.6 3.6 3.6 3.5   LSL  4700 4700 4700 4700 4700 4700   Pulsatility  Intermittent pulse Intermittent pulse Intermittent pulse Intermittent pulse Intermittent pulse Intermittent pulse          Rounded on Kevan Queen to ensure all mechanical assist device settings (IABP or VAD) were appropriate and all parameters were within limits.  I was able to ensure all back up equipment was present, the staff had no issues, and the Perfusion Department daily rounding was complete.      For implantable VADs: Interrogation of Ventricular assist device was performed with analysis of device parameters and review of device function. I have personally reviewed the interrogation findings and agree with findings as stated.     In emergency, the nursing units have been notified to contact the perfusion department either by:  Calling [phone] from 630am to 4pm Mon thru Fri, utilizing the On-Call Finder functionality of Epic and searching for Perfusion, or by contacting the hospital  from 4pm to 630am and on weekends and asking to speak with the perfusionist on call.    9:52 AM

## 2025-01-02 NOTE — PROGRESS NOTES
Admit Note     Met with patient to assess needs. Patient is a 39 y.o. 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      Pt received LVAD on 6/3/22.  Pt's mother does his dressing changes    Patient admitted from [facility] ED on 12/31/2024 . Pt presents alone in room.  At this time, patient presents as alert and oriented x 4, pleasant, good eye contact, concentration/judgement good, calm, communicative, cooperative, and asking and answering questions appropriately.      Household/Family Systems     Patient resides with patient's mother,    Address:   [address] [place] [id number]    Significant others address:  [address] [place] [address]      Pt's phone number: [phone]    Emergency contacts  Malou Dumont, mother: [phone]  Darlny (Page) Kesha, significant other: [phone]    Support system includes mother, significant other, and children.  Pt has seven children, two with the pt's significant other.  The pt's other children live with their mothers.  Pt's family/mother care for children in the home when pt and significant other are not in attendance.      Patients primary caregiver is self with support from his mother and significant other when needed    During admission, patient's caregiver plans to stay at home.  Confirmed patient and patients caregivers do have access to reliable transportation.    Cognitive Status/Learning     Patient reports reading ability as college and states patient does not have difficulty with reading, writing, hearing, comprehension, learning, and memory.  Pt reports some difficulty with eyesight but it does not affect his ability to drive.  Patient reports patient learns best by multiple learning styles.   Needed: No.   Highest education level: Attended College/Technical School    Vocation/Disability   .  Working for  Income: No  If no, reason not working: Disability  Patient is disabled due to cardiac issues since 2024.  Pt started receiving disability in 2024.  Prior to disability, patient  was employed as a CNA.    Adherence     Patient reports a high level of adherence to patients health care regimen. Pt takes his medications as prescribed and attends his doctor's appointments as scheduled. Pt reports he adheres to a vegan diet.  Adherence counseling and education provided. Patient verbalizes understanding.    Substance Use    Patient reports the following substance usage.    Tobacco: none, patient denies any use.  Alcohol: none, patient denies any use.  Illicit Drugs/Non-prescribed Medications: none, patient denies any use.  Patient states clear understanding of the potential impact of substance use.  Substance abstinence/cessation counseling, education and resources provided and reviewed.     Services Utilizing/ADLS    Infusion Service: Prior to admission, patient utilizing? yes.  Pt utilizes [facility] ([phone]) for Primacor  Home Health: Prior to admission, patient utilizing? no.  Pt reports utilizing [facility] (phone: [phone], fax: [phone]) in the past and would use them again if needed  DME: Prior to admission, yes.  Pt reports using a wheelchair when he goes out in public and uses a walker as needed at home.  Pulmonary/Cardiac Rehab: Prior to admission, no  Dialysis:  Prior to admission, no  Transplant Specialty Pharmacy:  Prior to admission, no.    Prior to admission, patient reports patient was independent with ADLS and was driving.  Patient reports patient is not able to care for self at this time due to compromised medical condition (as documented in medical record) and physical weakness..  Patient indicates a willingness to care for self once medically cleared to do so.    Insurance/Medications    Insured by   Payer/Plan Subscr  Sex Relation Sub. Ins. ID Effective Group  Num   1. MEDICAID - HE* JOSE J DAMICO* 1985 Male Self [id number]* 3/1/20 [id number]                                   [address] [address]      Primary Insurance (for UNOS reporting): Public Insurance - Medicaid  Secondary Insurance (for UNOS reporting): None    Patient reports patient is able to obtain and afford medications at this time and at time of discharge.    Living Will/Healthcare Power of     Patient states patient has a LW and/or HCPA.  HPCA on file in Epic.  Niharika Wright is primary contact, Malou Dumont and is secondary contact.  provided education regarding LW and HCPA and the completion of forms.    Coping/Mental Health    Patient is coping well with the aid of  family members.  Patient denies mental health difficulties. Pt has indicated mental health concerns in the past.  Pt reports no mental health needs or concerns at this time.    Discharge Planning    At time of discharge, patient plans to return to patient's home under the care of self with support from his mother.  Patients mother will transport patient.  Per rounds today, expected discharge date has not been medically determined at this time. Patient and patients caregiver  verbalize understanding and are involved in treatment planning and discharge process.    Additional Concerns    Patient is being followed for needs, education, resources, information, emotional support, supportive counseling, and for supportive and skilled discharge plan of care.  providing ongoing psychosocial support, education, resources and d/c planning as needed.  SW remains available. Patient denies additional needs and/or concerns at this time. Patient verbalizes understanding and agreement with information reviewed, social work availability, and how to access available resources as needed.     [id number] Comprehensive

## 2025-01-02 NOTE — PLAN OF CARE
Problem: Ventricular Assist Device  Goal: Optimal Adjustment to Device  Outcome: Progressing  Intervention: Optimize Psychosocial Response to VAD  Flowsheets (Taken 1/2/2025 1025)  Supportive Measures: active listening utilized  Goal: Absence of Bleeding  Outcome: Progressing  Intervention: Monitor and Manage Bleeding  Flowsheets (Taken 1/2/2025 1025)  Bleeding Precautions: blood pressure closely monitored  Goal: Absence of Embolism Signs and Symptoms  Outcome: Progressing  Intervention: Prevent or Manage Embolism  Flowsheets (Taken 1/2/2025 1025)  VTE Prevention/Management: bleeding risk assessed  Goal: Optimal Blood Flow  Outcome: Progressing  Goal: Absence of Infection Signs and Symptoms  Outcome: Progressing  Intervention: Prevent or Manage Infection  Flowsheets (Taken 1/2/2025 1025)  Infection Prevention: cohorting utilized  Goal: Effective Right-Sided Heart Function  Outcome: Progressing  Intervention: Manage Decreased Right Heart Function  Flowsheets (Taken 1/2/2025 1025)  Medication Review/Management: medications reviewed     Problem: Adult Inpatient Plan of Care  Goal: Plan of Care Review  Outcome: Progressing  Goal: Patient-Specific Goal (Individualized)  Outcome: Progressing  Goal: Absence of Hospital-Acquired Illness or Injury  Outcome: Progressing  Intervention: Identify and Manage Fall Risk  Flowsheets (Taken 1/2/2025 1025)  Safety Promotion/Fall Prevention: assistive device/personal item within reach  Goal: Optimal Comfort and Wellbeing  Outcome: Progressing  Goal: Readiness for Transition of Care  Outcome: Progressing     Problem: Diabetes Comorbidity  Goal: Blood Glucose Level Within Targeted Range  Outcome: Progressing     Problem: Sepsis/Septic Shock  Goal: Optimal Coping  Outcome: Progressing  Goal: Absence of Bleeding  Outcome: Progressing  Goal: Blood Glucose Level Within Targeted Range  Outcome: Progressing  Goal: Absence of Infection Signs and Symptoms  Outcome: Progressing  Goal: Optimal  Nutrition Intake  Outcome: Progressing     Problem: Fall Injury Risk  Goal: Absence of Fall and Fall-Related Injury  Outcome: Progressing     Problem: Heart Failure  Goal: Optimal Coping  Outcome: Progressing  Goal: Optimal Cardiac Output  Outcome: Progressing  Goal: Stable Heart Rate and Rhythm  Outcome: Progressing  Goal: Optimal Functional Ability  Outcome: Progressing  Goal: Fluid and Electrolyte Balance  Outcome: Progressing  Goal: Improved Oral Intake  Outcome: Progressing  Goal: Effective Oxygenation and Ventilation  Outcome: Progressing  Goal: Effective Breathing Pattern During Sleep  Outcome: Progressing

## 2025-01-02 NOTE — ASSESSMENT & PLAN NOTE
-HeartMate 3 Implanted 6/23/2022 with early RV failure requiring RVAD with ProTek Duo   -Continue Coumadin,  Goal INR 2.0-3.0 . Therapeutic today.  Previous home regimen 1.5mg Qdaily  -Antiplatelets ASA 81 mg, on hold with GIB  -LDH is stable overall today. Will continue to monitor daily.  -Speed set at 5100  -Interrogation notable for no events  -Not listed for OHTx      Procedure: Device Interrogation Including analysis of device parameters  Current Settings: Ventricular Assist Device  Review of device function is stable      1/2/2025     8:55 AM 1/2/2025     5:45 AM 1/1/2025    11:37 PM 1/1/2025     8:15 PM 1/1/2025     3:49 PM 1/1/2025    12:06 PM 1/1/2025     7:32 AM   TXP LVAD INTERROGATIONS   Type HeartMate3 HeartMate3 HeartMate3 HeartMate3 HeartMate3 HeartMate3 HeartMate3   Flow 4.1 3.9 3.9 4 3.9 4.4 4   Speed 5100 5100 5100 5100 5100 5100 5150   PI 5 6.4 6 5.7 5.7 4.2 5.5   Power (Serna) 3.6 3.6 3.6 3.6 3.6 3.6 3.5   LSL  4700 4700 4700 4700 4700 4700   Pulsatility  Intermittent pulse Intermittent pulse Intermittent pulse Intermittent pulse Intermittent pulse Intermittent pulse

## 2025-01-02 NOTE — PT/OT/SLP PROGRESS
Occupational Therapy   Treatment and DC     Name: Kevan Queen  MRN: [id number]  Admitting Diagnosis:  Acute decompensated heart failure       Recommendations:     Discharge Recommendations: No Therapy Indicated  Discharge Equipment Recommendations:  none  Barriers to discharge:       Assessment:     Kevan Queen is a 39 y.o. male with a medical diagnosis of Acute decompensated heart failure.  Pt presents with decreased endurance and impaired mobility performance as limited by cardiovascular status and generalized weakness. Pt found upright in bed and agreeable for therapy. Session focused on mobility in room taking several laps with no AD. Pt wearing LVAD batteries throughout with VS stable. Performance deficits affecting function are weakness, impaired endurance, impaired self care skills, impaired functional mobility, impaired cardiopulmonary response to activity.     Subjective     Chief Complaint: to leave hospital  Patient/Family Comments/goals: go home  Pain/Comfort:  Pain Rating 1: 0/10  Pain Rating Post-Intervention 1: 0/10  Pain Rating Post-Intervention 2: 0/10    Objective:     Communicated with: RN prior to session.  Patient found HOB elevated with peripheral IV, LVAD, telemetry upon OT entry to room.    General Precautions: Standard, fall, LVAD    Orthopedic Precautions:N/A  Braces: N/A  Respiratory Status: Room air     Occupational Performance:     Bed Mobility:    Patient completed Rolling/Turning to Right with independence  Patient completed Scooting/Bridging with independence  Patient completed Supine to Sit with independence     Functional Mobility/Transfers:  Patient completed Sit <> Stand Transfer with supervision  with  no assistive device   Patient completed Bed <> Chair Transfer using Step Transfer technique with supervision with no assistive device  Functional Mobility: Pt mobilized in room 3 laps with no AD navigating own IV pole.    Activities of Daily Living:  Grooming:  declined .  Lower Body Dressing: independence donning shoes at EOB      [facility] 6 Click ADL: 24    Treatment & Education:  Pt educated on role of occupational therapy, POC, and safety during ADLs and functional mobility. Pt and OT discussed importance of safe, continued mobility to optimize daily living skills. Pt verbalized understanding.     White board updated during session. Pt given instruction to call for medical staff/nurse for assistance.       Patient left up in chair with all lines intact, call button in reach, and RN notified    GOALS:   Multidisciplinary Problems       Occupational Therapy Goals       Not on file              Multidisciplinary Problems (Resolved)          Problem: Occupational Therapy    Goal Priority Disciplines Outcome Interventions   Occupational Therapy Goal   (Resolved)     OT, PT/OT Met    Description: Goals to be met by: 1/28/25     Patient will increase functional independence with ADLs by performing:    UE Dressing with [place].  LE Dressing with [place].  Grooming while standing at sink with [place].  Toileting from toilet with [place] for hygiene and clothing management.   Toilet transfer to toilet with [place].                         Time Tracking:     OT Date of Treatment: 01/02/25  OT Start Time: 0812  OT Stop Time: 0824  OT Total Time (min): 12 min    Billable Minutes:Therapeutic Activity 12 min    OT/MARTÍNEZ: OT          1/2/2025

## 2025-01-02 NOTE — PLAN OF CARE
Problem: Occupational Therapy  Goal: Occupational Therapy Goal  Description: Goals to be met by: 1/28/25     Patient will increase functional independence with ADLs by performing:    UE Dressing with [place].  LE Dressing with [place].  Grooming while standing at sink with [place].  Toileting from toilet with [place] for hygiene and clothing management.   Toilet transfer to toilet with [place].    Goals met. No further acute OT needs.  Nia Keen, OT  1/2/2025    Outcome: Met

## 2025-01-02 NOTE — PROGRESS NOTES
"[facility]  Endocrinology  Progress Note    Admit Date: 12/31/2024     Reason for Consult: Management of T2DM, Hyperglycemia     Surgical Procedure and Date: LVAD 06/29/2022      Diabetes diagnosis year: 2022    Lab Results   Component Value Date    HGBA1C 10.3 (H) 08/25/2024       Home Diabetes Medications:  Levemir 20 units twice daily and Novolog 10 or 18 units with meals per patient    How often checking glucose at home? 3-4x day   BG readings on regimen: 150-200s  Hypoglycemia on the regimen?  Yes, at least once weekly; experiences blurred vision  Missed doses on regimen?  Yes, he has been out of Levemir "for a while" since it has been discontinued    Diabetes Complications include:   Hyperglycemia     Complicating diabetes co morbidities:   History of MI, CHF, and CKD      HPI:   Patient is a 39 y.o. 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 He underwent RVAD removal and chest closure 6/30/2022. He presented with one month hx of dyspnea and abdominal distention. He reported bilateral LE edema which prompted him to come to the ED with the assistance of his family as they drove from [place]. He reported intermittent chest pain with coughing. Endo consulted for BG management.            Interval HPI:   No acute events overnight. Patient in room [id number] A. Blood glucose stable. BG at and above goal on current insulin regimen (SSI, prandial, and basal insulin ). Steroid use- None.    Renal function- Normal   Lab Results   Component Value Date    CREATININE 1.0 01/01/2025     Vasopressors-  None     Endocrine will continue to follow and manage insulin orders inpatient.     Diet diabetic Cardiac (Low Na/Chol), Low " "Sodium,2gm; 2000 Calories (up to 75 gm per meal); Fluid - 1500mL     Eatin%  Nausea: No  Hypoglycemia and intervention: No  Fever: No  TPN and/or TF: No  If yes, type of TF/TPN and rate: N/A    BP (!) 88/0 (BP Location: Right arm, Patient Position: Sitting)   Pulse 89   Temp 97 °F (36.1 °C) (Oral)   Resp 18   Ht 6' 3" (1.905 m)   Wt 82.5 kg (181 lb 14.1 oz)   SpO2 96%   BMI 22.73 kg/m²     Labs Reviewed and Include    Recent Labs   Lab 25  1746   *   CALCIUM 8.6*   *   K 4.0   CO2 23   CL 99   BUN 16   CREATININE 1.0     Lab Results   Component Value Date    WBC 9.20 2025    HGB 8.2 (L) 2025    HCT 28.9 (L) 2025    MCV 64 (L) 2025     2025     No results for input(s): "TSH", "FREET4" in the last 168 hours.  Lab Results   Component Value Date    HGBA1C 10.6 (H) 2024       Nutritional status:   Body mass index is 22.73 kg/m².  Lab Results   Component Value Date    ALBUMIN 2.4 (L) 2025    ALBUMIN 2.4 (L) 2024    ALBUMIN 2.4 (L) 2024     Lab Results   Component Value Date    PREALBUMIN 9 (L) 2025    PREALBUMIN 12 (L) 2024    PREALBUMIN 14 (L) 2024       Estimated Creatinine Clearance: 115.7 mL/min (based on SCr of 1 mg/dL).    Accu-Checks  Recent Labs     24  0618 24  1756 24  2045 25  0614 25  1256 25  1737 25   POCTGLUCOSE 361* 183* 311* 143* 216* 173* 173*       Current Medications and/or Treatments Impacting Glycemic Control  Immunotherapy:    Immunosuppressants       None          Steroids:   Hormones (From admission, onward)      None          Pressors:    Autonomic Drugs (From admission, onward)      None          Hyperglycemia/Diabetes Medications:   Antihyperglycemics (From admission, onward)      Start     Stop Route Frequency Ordered    25 0900  insulin glargine U-100 (Lantus) pen 10 Units         -- SubQ Daily 25 0813    24 1645  " insulin aspart U-100 pen 6 Units         -- SubQ 3 times daily with meals 12/31/24 1426    12/31/24 1525  insulin aspart U-100 pen 0-10 Units         -- SubQ Before meals & nightly PRN 12/31/24 1426            ASSESSMENT and PLAN    Cardiac/Vascular  * Acute decompensated heart failure  Managed by primary team  Optimize blood glucose control        HTN (hypertension)  Uncontrolled HTN can worsen insulin resistance.         LVAD (left ventricular assist device) present  Managed by primary team      Endocrine  Type 2 diabetes mellitus with hyperglycemia, with long-term current use of insulin  Endocrinology consulted for BG management.   BG goal 140-180    - Lantus (Insulin Glargine) 10 units daily   - Novolog (Insulin Aspart) 6-9 units TIDWM. Administer 6 units if patient eats 25% -  50% and 9 units if patient eats 50% or more.  HOLD if patient is NPO, unable to eat, or if Blood Glucose is less than 100 mg/dL.   - [facility] SSI (150/25)  - BG checks AC/HS  - Hypoglycemia protocol in place    ** Please notify Endocrine for any change and/or advance in diet**  ** Please call Endocrine for any BG related issues **    Discharge Planning:   TBD. Please notify endocrinology prior to discharge.            Guera De Oliveira PA-C  Endocrinology  [facility]

## 2025-01-02 NOTE — SUBJECTIVE & OBJECTIVE
"Interval HPI:   No acute events overnight. Patient in room [id number] A. Blood glucose stable. BG at and above goal on current insulin regimen (SSI, prandial, and basal insulin ). Steroid use- None.    Renal function- Normal   Lab Results   Component Value Date    CREATININE 1.0 01/02/2025     Vasopressors-  None     Endocrine will continue to follow and manage insulin orders inpatient.     Diet diabetic Cardiac (Low Na/Chol), Low Sodium,2gm; 2000 Calories (up to 75 gm per meal); Fluid - 1500mL     Eating:   <25%  Nausea: No  Hypoglycemia and intervention: No  Fever: No  TPN and/or TF: No  If yes, type of TF/TPN and rate: N/A    BP (!) 82/0   Pulse 90   Temp 98.3 °F (36.8 °C) (Oral)   Resp 18   Ht 6' 3" (1.905 m)   Wt 83.2 kg (183 lb 6.8 oz)   SpO2 98%   BMI 22.93 kg/m²     Labs Reviewed and Include    Recent Labs   Lab 01/02/25 2032   *   CALCIUM 8.4*   *   K 4.8   CO2 18*   CL 99   BUN 20   CREATININE 1.0     Lab Results   Component Value Date    WBC 10.09 01/03/2025    HGB 8.3 (L) 01/03/2025    HCT 28.9 (L) 01/03/2025    MCV 64 (L) 01/03/2025     01/03/2025     No results for input(s): "TSH", "FREET4" in the last 168 hours.  Lab Results   Component Value Date    HGBA1C 10.6 (H) 12/31/2024       Nutritional status:   Body mass index is 22.93 kg/m².  Lab Results   Component Value Date    ALBUMIN 2.4 (L) 01/01/2025    ALBUMIN 2.4 (L) 12/31/2024    ALBUMIN 2.4 (L) 12/08/2024     Lab Results   Component Value Date    PREALBUMIN 9 (L) 01/01/2025    PREALBUMIN 12 (L) 11/21/2024    PREALBUMIN 14 (L) 09/27/2024       Estimated Creatinine Clearance: 116.7 mL/min (based on SCr of 1 mg/dL).    Accu-Checks  Recent Labs     12/31/24 1756 12/31/24 2045 01/01/25  0614 01/01/25  1256 01/01/25  1737 01/01/25 2015 01/02/25  0909 01/02/25  1243 01/02/25  1618 01/02/25 2109   POCTGLUCOSE 183* 311* 143* 216* 173* 173* 166* 172* 198* 180*       Current Medications and/or Treatments Impacting " Glycemic Control  Immunotherapy:    Immunosuppressants       None          Steroids:   Hormones (From admission, onward)      None          Pressors:    Autonomic Drugs (From admission, onward)      None          Hyperglycemia/Diabetes Medications:   Antihyperglycemics (From admission, onward)      Start     Stop Route Frequency Ordered    01/03/25 1130  insulin aspart U-100 pen 8-11 Units         -- SubQ 3 times daily with meals 01/03/25 0841    01/01/25 0900  insulin glargine U-100 (Lantus) pen 10 Units         -- SubQ Daily 01/01/25 0813    12/31/24 1525  insulin aspart U-100 pen 0-10 Units         -- SubQ Before meals & nightly PRN 12/31/24 1421

## 2025-01-02 NOTE — SUBJECTIVE & OBJECTIVE
Interval History: patient admitted with ADHF and is diuresing on lasix gtt. Feeling better this AM.      Continuous Infusions:   furosemide (Lasix) 500 mg in 50 mL infusion (conc: 10 mg/mL)  40 mg/hr Intravenous Continuous 4 mL/hr at 01/02/25 0104 40 mg/hr at 01/02/25 0104     Scheduled Meds:   amiodarone  200 mg Oral Daily    amLODIPine  5 mg Oral Daily    aspirin  81 mg Oral Daily    atorvastatin  40 mg Oral Daily    cefadroxil  500 mg Oral Q12H    DULoxetine  30 mg Oral Daily    eplerenone  50 mg Oral Daily    gabapentin  100 mg Oral Daily    And    gabapentin  400 mg Oral QHS    insulin aspart U-100  6 Units Subcutaneous TIDWM    insulin glargine U-100  10 Units Subcutaneous Daily    levETIRAcetam  1,500 mg Oral BID    potassium chloride  40 mEq Oral BID    warfarin  1.5 mg Oral Daily     PRN Meds:  Current Facility-Administered Medications:     acetaminophen, 650 mg, Oral, Q6H PRN    dextrose 50%, 12.5 g, Intravenous, PRN    dextrose 50%, 25 g, Intravenous, PRN    glucagon (human recombinant), 1 mg, Intramuscular, PRN    glucose, 16 g, Oral, PRN    glucose, 24 g, Oral, PRN    influenza, 0.5 mL, Intramuscular, vaccine x 1 dose    insulin aspart U-100, 0-10 Units, Subcutaneous, QID (AC + HS) PRN    Review of patient's allergies indicates:   Allergen Reactions    Bumex [bumetanide] Hives     Objective:     Vital Signs (Most Recent):  Temp: 98 °F (36.7 °C) (01/02/25 1116)  Pulse: 80 (01/02/25 1125)  Resp: (!) 21 (01/02/25 1116)  BP: (!) 86/0 (01/02/25 1145)  SpO2: (!) 91 % (01/02/25 1116) Vital Signs (24h Range):  Temp:  [97 °F (36.1 °C)-98.3 °F (36.8 °C)] 98 °F (36.7 °C)  Pulse:  [72-94] 80  Resp:  [17-21] 21  SpO2:  [91 %-99 %] 91 %  BP: ()/(0-79) 86/0     Patient Vitals for the past 72 hrs (Last 3 readings):   Weight   01/01/25 2337 82.5 kg (181 lb 14.1 oz)   01/01/25 1258 72.3 kg (159 lb 6.3 oz)   01/01/25 0732 72.3 kg (159 lb 6.3 oz)     Body mass index is 22.73 kg/m².      Intake/Output Summary (Last 24  hours) at 1/2/2025 1159  Last data filed at 1/2/2025 1151  Gross per 24 hour   Intake 1284 ml   Output 3325 ml   Net -2041 ml       Hemodynamic Parameters:       Telemetry: reviewed        Physical Exam  Vitals and nursing note reviewed.   Constitutional:       Appearance: Normal appearance.   HENT:      Head: Normocephalic.      Mouth/Throat:      Mouth: Mucous membranes are moist.   Eyes:      Pupils: Pupils are equal, round, and reactive to light.   Neck:      Comments: JVP elevation to mid-upper neck   Cardiovascular:      Comments: Smooth VAD hum   Pulmonary:      Effort: Pulmonary effort is normal.   Abdominal:      General: Bowel sounds are normal. There is no distension.      Palpations: Abdomen is soft.   Musculoskeletal:         General: Normal range of motion.      Right lower leg: Edema present.      Left lower leg: Edema present.   Skin:     General: Skin is warm.      Capillary Refill: Capillary refill takes 2 to 3 seconds.   Neurological:      General: No focal deficit present.      Mental Status: He is alert and oriented to person, place, and time.   Psychiatric:         Mood and Affect: Mood normal.         Behavior: Behavior normal.            Significant Labs:  CBC:  Recent Labs   Lab 12/31/24 0339 01/01/25  0455 01/02/25  0526   WBC 11.55 14.95* 9.20   RBC 4.35* 4.34* 4.50*   HGB 7.9* 7.9* 8.2*   HCT 27.5* 27.6* 28.9*    298 362   MCV 63* 64* 64*   MCH 18.2* 18.2* 18.2*   MCHC 28.7* 28.6* 28.4*     BNP:  Recent Labs   Lab 12/31/24 0339   *     CMP:  Recent Labs   Lab 12/31/24  0339 01/01/25  0455 01/01/25  1746 01/02/25  0827   * 128* 143* 140*   CALCIUM 8.2* 8.1* 8.6* 8.5*   ALBUMIN 2.4* 2.4*  --   --    PROT 8.6* 8.3  --   --    * 133* 132* 134*   K 3.7 3.2* 4.0 3.9   CO2 22* 22* 23 23   CL 99 98 99 99   BUN 16 14 16 18   CREATININE 1.1 1.2 1.0 1.2   ALKPHOS 153* 154*  --   --    ALT 7* 7*  --   --    AST 38 26  --   --    BILITOT 1.8* 1.9*  --   --        Coagulation:   Recent Labs   Lab 12/31/24  0618 01/01/25  0455 01/02/25  0526   INR 2.0* 2.1* 2.1*   APTT  --  33.6* 33.1*     LDH:  Recent Labs   Lab 01/01/25  0455 01/02/25  0526   * 294*     Microbiology:  Microbiology Results (last 7 days)       ** No results found for the last 168 hours. **            I have reviewed all pertinent labs within the past 24 hours.    Estimated Creatinine Clearance: 96.4 mL/min (based on SCr of 1.2 mg/dL).    Diagnostic Results:  I have reviewed all pertinent imaging results/findings within the past 24 hours.

## 2025-01-02 NOTE — PROGRESS NOTES
[facility]  Heart Transplant  Progress Note    Patient Name: Kevan Queen  MRN: [id number]  Admission Date: 12/31/2024  Hospital Length of Stay: 2 days  Attending Physician: Natalya Villatoro MD  Primary Care Provider: Rosio Armendariz FNP  Principal Problem:Acute decompensated heart failure    Subjective:   Interval History: patient admitted with ADHF and is diuresing on lasix gtt. Feeling better this AM.      Continuous Infusions:   furosemide (Lasix) 500 mg in 50 mL infusion (conc: 10 mg/mL)  40 mg/hr Intravenous Continuous 4 mL/hr at 01/02/25 0104 40 mg/hr at 01/02/25 0104     Scheduled Meds:   amiodarone  200 mg Oral Daily    amLODIPine  5 mg Oral Daily    aspirin  81 mg Oral Daily    atorvastatin  40 mg Oral Daily    cefadroxil  500 mg Oral Q12H    DULoxetine  30 mg Oral Daily    eplerenone  50 mg Oral Daily    gabapentin  100 mg Oral Daily    And    gabapentin  400 mg Oral QHS    insulin aspart U-100  6 Units Subcutaneous TIDWM    insulin glargine U-100  10 Units Subcutaneous Daily    levETIRAcetam  1,500 mg Oral BID    potassium chloride  40 mEq Oral BID    warfarin  1.5 mg Oral Daily     PRN Meds:  Current Facility-Administered Medications:     acetaminophen, 650 mg, Oral, Q6H PRN    dextrose 50%, 12.5 g, Intravenous, PRN    dextrose 50%, 25 g, Intravenous, PRN    glucagon (human recombinant), 1 mg, Intramuscular, PRN    glucose, 16 g, Oral, PRN    glucose, 24 g, Oral, PRN    influenza, 0.5 mL, Intramuscular, vaccine x 1 dose    insulin aspart U-100, 0-10 Units, Subcutaneous, QID (AC + HS) PRN    Review of patient's allergies indicates:   Allergen Reactions    Bumex [bumetanide] Hives     Objective:     Vital Signs (Most Recent):  Temp: 98 °F (36.7 °C) (01/02/25 1116)  Pulse: 80 (01/02/25 1125)  Resp: (!) 21 (01/02/25 1116)  BP: (!) 86/0 (01/02/25 1145)  SpO2: (!) 91 % (01/02/25 1116) Vital Signs (24h Range):  Temp:  [97 °F (36.1 °C)-98.3 °F (36.8 °C)] 98 °F (36.7 °C)  Pulse:   [72-94] 80  Resp:  [17-21] 21  SpO2:  [91 %-99 %] 91 %  BP: ()/(0-79) 86/0     Patient Vitals for the past 72 hrs (Last 3 readings):   Weight   01/01/25 2337 82.5 kg (181 lb 14.1 oz)   01/01/25 1258 72.3 kg (159 lb 6.3 oz)   01/01/25 0732 72.3 kg (159 lb 6.3 oz)     Body mass index is 22.73 kg/m².      Intake/Output Summary (Last 24 hours) at 1/2/2025 1159  Last data filed at 1/2/2025 1151  Gross per 24 hour   Intake 1284 ml   Output 3325 ml   Net -2041 ml       Hemodynamic Parameters:       Telemetry: reviewed        Physical Exam  Vitals and nursing note reviewed.   Constitutional:       Appearance: Normal appearance.   HENT:      Head: Normocephalic.      Mouth/Throat:      Mouth: Mucous membranes are moist.   Eyes:      Pupils: Pupils are equal, round, and reactive to light.   Neck:      Comments: JVP elevation to mid-upper neck   Cardiovascular:      Comments: Smooth VAD hum   Pulmonary:      Effort: Pulmonary effort is normal.   Abdominal:      General: Bowel sounds are normal. There is no distension.      Palpations: Abdomen is soft.   Musculoskeletal:         General: Normal range of motion.      Right lower leg: Edema present.      Left lower leg: Edema present.   Skin:     General: Skin is warm.      Capillary Refill: Capillary refill takes 2 to 3 seconds.   Neurological:      General: No focal deficit present.      Mental Status: He is alert and oriented to person, place, and time.   Psychiatric:         Mood and Affect: Mood normal.         Behavior: Behavior normal.            Significant Labs:  CBC:  Recent Labs   Lab 12/31/24  0339 01/01/25  0455 01/02/25  0526   WBC 11.55 14.95* 9.20   RBC 4.35* 4.34* 4.50*   HGB 7.9* 7.9* 8.2*   HCT 27.5* 27.6* 28.9*    298 362   MCV 63* 64* 64*   MCH 18.2* 18.2* 18.2*   MCHC 28.7* 28.6* 28.4*     BNP:  Recent Labs   Lab 12/31/24  0339   *     CMP:  Recent Labs   Lab 12/31/24  0339 01/01/25  0455 01/01/25  1746 01/02/25  0827   * 128* 143*  140*   CALCIUM 8.2* 8.1* 8.6* 8.5*   ALBUMIN 2.4* 2.4*  --   --    PROT 8.6* 8.3  --   --    * 133* 132* 134*   K 3.7 3.2* 4.0 3.9   CO2 22* 22* 23 23   CL 99 98 99 99   BUN 16 14 16 18   CREATININE 1.1 1.2 1.0 1.2   ALKPHOS 153* 154*  --   --    ALT 7* 7*  --   --    AST 38 26  --   --    BILITOT 1.8* 1.9*  --   --       Coagulation:   Recent Labs   Lab 12/31/24  0618 01/01/25  0455 01/02/25  0526   INR 2.0* 2.1* 2.1*   APTT  --  33.6* 33.1*     LDH:  Recent Labs   Lab 01/01/25  0455 01/02/25  0526   * 294*     Microbiology:  Microbiology Results (last 7 days)       ** No results found for the last 168 hours. **            I have reviewed all pertinent labs within the past 24 hours.    Estimated Creatinine Clearance: 96.4 mL/min (based on SCr of 1.2 mg/dL).    Diagnostic Results:  I have reviewed all pertinent imaging results/findings within the past 24 hours.  Assessment and Plan:     eKvan Queen is a 39 year old male with stage D 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 He underwent RVAD removal and chest closure 6/30/2022. He is off inotropes. He presents with one month hx of dyspnea and abdominal distention. Stated he takes lasix 80mg daily. He develops rash to both torsemide and bumex. He reports yesterday he noticed bilateral LE edema which prompted him to come to the ED with the assistance of his family as they drove from [place]. He reports intermittent chest pain with coughing but not occurring at the moment. He denies any LVAD alarms. He reports eating salty foods yesterday.      On 11/15/23 underwent removal of eroded [employer] scICD--no Lifevest (due to LVAD) and no plan to replace ICD as not requiring therapy post LVAD with concern  for reinfection.      Of note, he presented to [facility] ER  on 12/8 for dyspnea and hyperglycemia, felt better so was discharged.     Upon arrival to the ER today, he was hemodynamically stable. Labs demonstrated (hyponatremia Na 131), asiya Ca 9.5, Hgb 7.9 (at baseline), . He was given lasix IV 60mg and made about 300cc of UOP. He is admitted to [facility] team for further management.          * Acute decompensated heart failure  -NICM  -Last 2D Echo 8/26/24: LVEF 5-10%, LVEDD 7.2 cm  -Hypervolemic on examination today  -Current diuretic regimen: Furosemide IVP. Will continue current regimen for now.    -GDMT with home dose of Eplerenone.  Will add as tolerated   -Patient is not currently being evaluated for OHTx due to non compliance  -2g Na dietary restriction, 1500 mL fluid restriction, strict I/Os      Atrial flutter  Continue home amiodarone and anticoagulation.    Type 2 diabetes mellitus with hyperglycemia, with long-term current use of insulin  A1c 10.   Resume insulin  Endocrinology consulted for BG management    HTN (hypertension)  Resume home antihypertensive - amlodipine and epleronone.    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     LVAD (left ventricular assist device) present  -HeartMate 3 Implanted 6/23/2022 with early RV failure requiring RVAD with ProTek Duo   -Continue Coumadin,  Goal INR 2.0-3.0 . Therapeutic today.  Previous home regimen 1.5mg Qdaily  -Antiplatelets ASA 81 mg, on hold with GIB  -LDH is stable overall today. Will continue to monitor daily.  -Speed set at 5100  -Interrogation notable for no events  -Not listed for OHTx      Procedure: Device Interrogation Including analysis of device parameters  Current Settings: Ventricular Assist Device  Review of device function is stable      1/2/2025      8:55 AM 1/2/2025     5:45 AM 1/1/2025    11:37 PM 1/1/2025     8:15 PM 1/1/2025     3:49 PM 1/1/2025    12:06 PM 1/1/2025     7:32 AM   TXP LVAD INTERROGATIONS   Type HeartMate3 HeartMate3 HeartMate3 HeartMate3 HeartMate3 HeartMate3 HeartMate3   Flow 4.1 3.9 3.9 4 3.9 4.4 4   Speed 5100 5100 5100 5100 5100 5100 5150   PI 5 6.4 6 5.7 5.7 4.2 5.5   Power (Serna) 3.6 3.6 3.6 3.6 3.6 3.6 3.5   LSL  4700 4700 4700 4700 4700 4700   Pulsatility  Intermittent pulse Intermittent pulse Intermittent pulse Intermittent pulse Intermittent pulse Intermittent pulse         Tobacco use  Active tobacco user.   Patient declined nicotine patch.    Anemia of chronic disease  Hgb 7.9 at baseline. Will order iron panel.     Anxiety disorder, unspecified  Resume duloxetine.        Jeff Spencer PA-C  Heart Transplant  [facility]

## 2025-01-02 NOTE — PROGRESS NOTES
Pt AAAO, no family at bedside.  Talked with pt, updates on the kids.  He said he enjoys being with him mom and having the kids there with him.   No questions for me regarding vad. Emotional support provided.

## 2025-01-03 ENCOUNTER — TELEPHONE (OUTPATIENT)
Dept: PALLIATIVE MEDICINE | Facility: CLINIC | Age: 40
End: 2025-01-03
Payer: MEDICAID

## 2025-01-03 LAB
ALBUMIN SERPL BCP-MCNC: 2.4 G/DL (ref 3.5–5.2)
ALP SERPL-CCNC: 153 U/L (ref 40–150)
ALT SERPL W/O P-5'-P-CCNC: 6 U/L (ref 10–44)
ANION GAP SERPL CALC-SCNC: 11 MMOL/L (ref 8–16)
ANION GAP SERPL CALC-SCNC: 13 MMOL/L (ref 8–16)
ANION GAP SERPL CALC-SCNC: 7 MMOL/L (ref 8–16)
APTT PPP: 30.3 SEC (ref 21–32)
AST SERPL-CCNC: 22 U/L (ref 10–40)
BASOPHILS # BLD AUTO: 0.04 K/UL (ref 0–0.2)
BASOPHILS NFR BLD: 0.4 % (ref 0–1.9)
BILIRUB DIRECT SERPL-MCNC: 1.4 MG/DL (ref 0.1–0.3)
BILIRUB SERPL-MCNC: 2 MG/DL (ref 0.1–1)
BUN SERPL-MCNC: 20 MG/DL (ref 6–20)
BUN SERPL-MCNC: 21 MG/DL (ref 6–20)
BUN SERPL-MCNC: 21 MG/DL (ref 6–20)
CALCIUM SERPL-MCNC: 8.6 MG/DL (ref 8.7–10.5)
CALCIUM SERPL-MCNC: 8.6 MG/DL (ref 8.7–10.5)
CALCIUM SERPL-MCNC: 8.7 MG/DL (ref 8.7–10.5)
CHLORIDE SERPL-SCNC: 97 MMOL/L (ref 95–110)
CHLORIDE SERPL-SCNC: 99 MMOL/L (ref 95–110)
CHLORIDE SERPL-SCNC: 99 MMOL/L (ref 95–110)
CO2 SERPL-SCNC: 22 MMOL/L (ref 23–29)
CO2 SERPL-SCNC: 24 MMOL/L (ref 23–29)
CO2 SERPL-SCNC: 26 MMOL/L (ref 23–29)
CREAT SERPL-MCNC: 1.2 MG/DL (ref 0.5–1.4)
CRP SERPL-MCNC: 28.7 MG/L (ref 0–8.2)
DIFFERENTIAL METHOD BLD: ABNORMAL
EOSINOPHIL # BLD AUTO: 0 K/UL (ref 0–0.5)
EOSINOPHIL NFR BLD: 0.4 % (ref 0–8)
ERYTHROCYTE [DISTWIDTH] IN BLOOD BY AUTOMATED COUNT: 24.3 % (ref 11.5–14.5)
EST. GFR  (NO RACE VARIABLE): >60 ML/MIN/1.73 M^2
GLUCOSE SERPL-MCNC: 143 MG/DL (ref 70–110)
GLUCOSE SERPL-MCNC: 179 MG/DL (ref 70–110)
GLUCOSE SERPL-MCNC: 199 MG/DL (ref 70–110)
HCT VFR BLD AUTO: 28.9 % (ref 40–54)
HGB BLD-MCNC: 8.3 G/DL (ref 14–18)
IMM GRANULOCYTES # BLD AUTO: 0.03 K/UL (ref 0–0.04)
IMM GRANULOCYTES NFR BLD AUTO: 0.3 % (ref 0–0.5)
INR PPP: 2.5 (ref 0.8–1.2)
LDH SERPL L TO P-CCNC: 256 U/L (ref 110–260)
LYMPHOCYTES # BLD AUTO: 0.9 K/UL (ref 1–4.8)
LYMPHOCYTES NFR BLD: 9.1 % (ref 18–48)
MAGNESIUM SERPL-MCNC: 1.9 MG/DL (ref 1.6–2.6)
MCH RBC QN AUTO: 18.4 PG (ref 27–31)
MCHC RBC AUTO-ENTMCNC: 28.7 G/DL (ref 32–36)
MCV RBC AUTO: 64 FL (ref 82–98)
MONOCYTES # BLD AUTO: 0.9 K/UL (ref 0.3–1)
MONOCYTES NFR BLD: 8.8 % (ref 4–15)
NEUTROPHILS # BLD AUTO: 8.2 K/UL (ref 1.8–7.7)
NEUTROPHILS NFR BLD: 81 % (ref 38–73)
NRBC BLD-RTO: 0 /100 WBC
PHOSPHATE SERPL-MCNC: 3.4 MG/DL (ref 2.7–4.5)
PLATELET # BLD AUTO: 388 K/UL (ref 150–450)
PMV BLD AUTO: 9.8 FL (ref 9.2–12.9)
POCT GLUCOSE: 132 MG/DL (ref 70–110)
POCT GLUCOSE: 142 MG/DL (ref 70–110)
POCT GLUCOSE: 189 MG/DL (ref 70–110)
POCT GLUCOSE: 219 MG/DL (ref 70–110)
POTASSIUM SERPL-SCNC: 4 MMOL/L (ref 3.5–5.1)
POTASSIUM SERPL-SCNC: 4 MMOL/L (ref 3.5–5.1)
POTASSIUM SERPL-SCNC: 4.1 MMOL/L (ref 3.5–5.1)
PREALB SERPL-MCNC: 8 MG/DL (ref 20–43)
PROT SERPL-MCNC: 8.3 G/DL (ref 6–8.4)
PROTHROMBIN TIME: 25.3 SEC (ref 9–12.5)
RBC # BLD AUTO: 4.52 M/UL (ref 4.6–6.2)
SODIUM SERPL-SCNC: 132 MMOL/L (ref 136–145)
SODIUM SERPL-SCNC: 132 MMOL/L (ref 136–145)
SODIUM SERPL-SCNC: 134 MMOL/L (ref 136–145)
WBC # BLD AUTO: 10.09 K/UL (ref 3.9–12.7)

## 2025-01-03 PROCEDURE — 36415 COLL VENOUS BLD VENIPUNCTURE: CPT | Performed by: STUDENT IN AN ORGANIZED HEALTH CARE EDUCATION/TRAINING PROGRAM

## 2025-01-03 PROCEDURE — 84134 ASSAY OF PREALBUMIN: CPT | Performed by: STUDENT IN AN ORGANIZED HEALTH CARE EDUCATION/TRAINING PROGRAM

## 2025-01-03 PROCEDURE — 99233 SBSQ HOSP IP/OBS HIGH 50: CPT | Mod: ,,, | Performed by: PHYSICIAN ASSISTANT

## 2025-01-03 PROCEDURE — 20600001 HC STEP DOWN PRIVATE ROOM

## 2025-01-03 PROCEDURE — 84100 ASSAY OF PHOSPHORUS: CPT | Performed by: STUDENT IN AN ORGANIZED HEALTH CARE EDUCATION/TRAINING PROGRAM

## 2025-01-03 PROCEDURE — 63600175 PHARM REV CODE 636 W HCPCS

## 2025-01-03 PROCEDURE — 85610 PROTHROMBIN TIME: CPT | Performed by: STUDENT IN AN ORGANIZED HEALTH CARE EDUCATION/TRAINING PROGRAM

## 2025-01-03 PROCEDURE — 86140 C-REACTIVE PROTEIN: CPT | Performed by: STUDENT IN AN ORGANIZED HEALTH CARE EDUCATION/TRAINING PROGRAM

## 2025-01-03 PROCEDURE — 80048 BASIC METABOLIC PNL TOTAL CA: CPT | Performed by: STUDENT IN AN ORGANIZED HEALTH CARE EDUCATION/TRAINING PROGRAM

## 2025-01-03 PROCEDURE — 83615 LACTATE (LD) (LDH) ENZYME: CPT | Performed by: STUDENT IN AN ORGANIZED HEALTH CARE EDUCATION/TRAINING PROGRAM

## 2025-01-03 PROCEDURE — 27000248 HC VAD-ADDITIONAL DAY

## 2025-01-03 PROCEDURE — 83735 ASSAY OF MAGNESIUM: CPT | Performed by: STUDENT IN AN ORGANIZED HEALTH CARE EDUCATION/TRAINING PROGRAM

## 2025-01-03 PROCEDURE — 93750 INTERROGATION VAD IN PERSON: CPT | Mod: ,,, | Performed by: INTERNAL MEDICINE

## 2025-01-03 PROCEDURE — 80076 HEPATIC FUNCTION PANEL: CPT | Performed by: STUDENT IN AN ORGANIZED HEALTH CARE EDUCATION/TRAINING PROGRAM

## 2025-01-03 PROCEDURE — 21400001 HC TELEMETRY ROOM

## 2025-01-03 PROCEDURE — 85025 COMPLETE CBC W/AUTO DIFF WBC: CPT | Performed by: STUDENT IN AN ORGANIZED HEALTH CARE EDUCATION/TRAINING PROGRAM

## 2025-01-03 PROCEDURE — 99232 SBSQ HOSP IP/OBS MODERATE 35: CPT | Mod: ,,,

## 2025-01-03 PROCEDURE — 36415 COLL VENOUS BLD VENIPUNCTURE: CPT

## 2025-01-03 PROCEDURE — 85730 THROMBOPLASTIN TIME PARTIAL: CPT | Performed by: STUDENT IN AN ORGANIZED HEALTH CARE EDUCATION/TRAINING PROGRAM

## 2025-01-03 PROCEDURE — 80048 BASIC METABOLIC PNL TOTAL CA: CPT | Mod: 91

## 2025-01-03 PROCEDURE — 25000003 PHARM REV CODE 250: Performed by: STUDENT IN AN ORGANIZED HEALTH CARE EDUCATION/TRAINING PROGRAM

## 2025-01-03 RX ORDER — INSULIN ASPART 100 [IU]/ML
8-11 INJECTION, SOLUTION INTRAVENOUS; SUBCUTANEOUS
Status: DISCONTINUED | OUTPATIENT
Start: 2025-01-03 | End: 2025-01-06

## 2025-01-03 RX ORDER — MUPIROCIN 20 MG/G
OINTMENT TOPICAL 2 TIMES DAILY
Status: DISPENSED | OUTPATIENT
Start: 2025-01-03 | End: 2025-01-08

## 2025-01-03 RX ADMIN — GABAPENTIN 100 MG: 100 CAPSULE ORAL at 08:01

## 2025-01-03 RX ADMIN — INSULIN ASPART 4 UNITS: 100 INJECTION, SOLUTION INTRAVENOUS; SUBCUTANEOUS at 05:01

## 2025-01-03 RX ADMIN — DULOXETINE HYDROCHLORIDE 30 MG: 30 CAPSULE, DELAYED RELEASE ORAL at 08:01

## 2025-01-03 RX ADMIN — AMIODARONE HYDROCHLORIDE 200 MG: 200 TABLET ORAL at 08:01

## 2025-01-03 RX ADMIN — LEVETIRACETAM 1500 MG: 500 TABLET, FILM COATED ORAL at 08:01

## 2025-01-03 RX ADMIN — POTASSIUM CHLORIDE 40 MEQ: 1500 TABLET, EXTENDED RELEASE ORAL at 08:01

## 2025-01-03 RX ADMIN — CEFADROXIL 500 MG: 500 CAPSULE ORAL at 08:01

## 2025-01-03 RX ADMIN — INSULIN ASPART 8 UNITS: 100 INJECTION, SOLUTION INTRAVENOUS; SUBCUTANEOUS at 05:01

## 2025-01-03 RX ADMIN — ASPIRIN 81 MG: 81 TABLET, COATED ORAL at 08:01

## 2025-01-03 RX ADMIN — AMLODIPINE BESYLATE 5 MG: 5 TABLET ORAL at 08:01

## 2025-01-03 RX ADMIN — FUROSEMIDE 40 MG/HR: 10 INJECTION, SOLUTION INTRAMUSCULAR; INTRAVENOUS at 01:01

## 2025-01-03 RX ADMIN — FUROSEMIDE 40 MG/HR: 10 INJECTION, SOLUTION INTRAMUSCULAR; INTRAVENOUS at 02:01

## 2025-01-03 RX ADMIN — ATORVASTATIN CALCIUM 40 MG: 40 TABLET, FILM COATED ORAL at 08:01

## 2025-01-03 RX ADMIN — INSULIN ASPART 2 UNITS: 100 INJECTION, SOLUTION INTRAVENOUS; SUBCUTANEOUS at 01:01

## 2025-01-03 RX ADMIN — INSULIN GLARGINE 10 UNITS: 100 INJECTION, SOLUTION SUBCUTANEOUS at 08:01

## 2025-01-03 RX ADMIN — EPLERENONE 50 MG: 25 TABLET, FILM COATED ORAL at 08:01

## 2025-01-03 RX ADMIN — WARFARIN SODIUM 1.5 MG: 5 TABLET ORAL at 05:01

## 2025-01-03 RX ADMIN — GABAPENTIN 400 MG: 400 CAPSULE ORAL at 08:01

## 2025-01-03 RX ADMIN — INSULIN ASPART 6 UNITS: 100 INJECTION, SOLUTION INTRAVENOUS; SUBCUTANEOUS at 08:01

## 2025-01-03 NOTE — CARE UPDATE
[facility] Care Support Interaction      I have reviewed the chart of Kevan Queen who is hospitalized for Acute decompensated heart failure. The patient is currently located in the following unit: CSU        I have assisted the primary physician in management of the following:      MRSA Decolonization - Mupirocin ordered and CHG ordered        Denise Espinoza PA-C  Unit [facility]

## 2025-01-03 NOTE — SUBJECTIVE & OBJECTIVE
Interval History: No overnight events. Diuresed effectively overnight. Net I/O of -2.1L/24h. Will continue until euvolemic    Continuous Infusions:   furosemide (Lasix) 500 mg in 50 mL infusion (conc: 10 mg/mL)  40 mg/hr Intravenous Continuous 4 mL/hr at 01/03/25 0150 40 mg/hr at 01/03/25 0150     Scheduled Meds:   amiodarone  200 mg Oral Daily    amLODIPine  5 mg Oral Daily    aspirin  81 mg Oral Daily    atorvastatin  40 mg Oral Daily    cefadroxil  500 mg Oral Q12H    DULoxetine  30 mg Oral Daily    eplerenone  50 mg Oral Daily    gabapentin  100 mg Oral Daily    And    gabapentin  400 mg Oral QHS    insulin aspart U-100  8-11 Units Subcutaneous TIDWM    insulin glargine U-100  10 Units Subcutaneous Daily    levETIRAcetam  1,500 mg Oral BID    potassium chloride  40 mEq Oral BID    warfarin  1.5 mg Oral Daily     PRN Meds:  Current Facility-Administered Medications:     acetaminophen, 650 mg, Oral, Q6H PRN    dextrose 50%, 12.5 g, Intravenous, PRN    dextrose 50%, 25 g, Intravenous, PRN    glucagon (human recombinant), 1 mg, Intramuscular, PRN    glucose, 16 g, Oral, PRN    glucose, 24 g, Oral, PRN    influenza, 0.5 mL, Intramuscular, vaccine x 1 dose    insulin aspart U-100, 0-10 Units, Subcutaneous, QID (AC + HS) PRN    Review of patient's allergies indicates:   Allergen Reactions    Bumex [bumetanide] Hives     Objective:     Vital Signs (Most Recent):  Temp: 97.6 °F (36.4 °C) (01/03/25 1129)  Pulse: 91 (01/03/25 1129)  Resp: 18 (01/03/25 1129)  BP: (!) 82/0 (01/03/25 0721)  SpO2: 98 % (01/03/25 1129) Vital Signs (24h Range):  Temp:  [97.6 °F (36.4 °C)-98.9 °F (37.2 °C)] 97.6 °F (36.4 °C)  Pulse:  [71-97] 91  Resp:  [18] 18  SpO2:  [94 %-98 %] 98 %  BP: ()/(0-74) 82/0     Patient Vitals for the past 72 hrs (Last 3 readings):   Weight   01/03/25 0400 83.2 kg (183 lb 6.8 oz)   01/01/25 2337 82.5 kg (181 lb 14.1 oz)   01/01/25 1258 72.3 kg (159 lb 6.3 oz)     Body mass index is 22.93  kg/m².      Intake/Output Summary (Last 24 hours) at 1/3/2025 1149  Last data filed at 1/3/2025 0751  Gross per 24 hour   Intake 662 ml   Output 3240 ml   Net -2578 ml       Hemodynamic Parameters:       Telemetry: reviewed        Physical Exam  Vitals and nursing note reviewed.   Constitutional:       Appearance: Normal appearance.   HENT:      Head: Normocephalic.      Mouth/Throat:      Mouth: Mucous membranes are moist.   Eyes:      Pupils: Pupils are equal, round, and reactive to light.   Neck:      Comments: JVP elevation to mid-upper neck   Cardiovascular:      Comments: Smooth VAD hum   Pulmonary:      Effort: Pulmonary effort is normal.   Abdominal:      General: Bowel sounds are normal. There is no distension.      Palpations: Abdomen is soft.   Musculoskeletal:         General: Normal range of motion.      Right lower leg: Edema present.      Left lower leg: Edema present.   Skin:     General: Skin is warm.      Capillary Refill: Capillary refill takes 2 to 3 seconds.   Neurological:      General: No focal deficit present.      Mental Status: He is alert and oriented to person, place, and time.   Psychiatric:         Mood and Affect: Mood normal.         Behavior: Behavior normal.            Significant Labs:  CBC:  Recent Labs   Lab 01/01/25  0455 01/02/25  0526 01/03/25  0735   WBC 14.95* 9.20 10.09   RBC 4.34* 4.50* 4.52*   HGB 7.9* 8.2* 8.3*   HCT 27.6* 28.9* 28.9*    362 388   MCV 64* 64* 64*   MCH 18.2* 18.2* 18.4*   MCHC 28.6* 28.4* 28.7*     BNP:  Recent Labs   Lab 12/31/24  0339   *     CMP:  Recent Labs   Lab 12/31/24  0339 01/01/25  0455 01/01/25  1746 01/02/25  0827 01/02/25 2032 01/03/25  0735   * 128*   < > 140* 164* 143*   CALCIUM 8.2* 8.1*   < > 8.5* 8.4* 8.7   ALBUMIN 2.4* 2.4*  --   --   --  2.4*   PROT 8.6* 8.3  --   --   --  8.3   * 133*   < > 134* 130* 134*   K 3.7 3.2*   < > 3.9 4.8 4.0   CO2 22* 22*   < > 23 18* 24   CL 99 98   < > 99 99 99   BUN 16 14    < > 18 20 21*   CREATININE 1.1 1.2   < > 1.2 1.0 1.2   ALKPHOS 153* 154*  --   --   --  153*   ALT 7* 7*  --   --   --  6*   AST 38 26  --   --   --  22   BILITOT 1.8* 1.9*  --   --   --  2.0*    < > = values in this interval not displayed.      Coagulation:   Recent Labs   Lab 01/01/25 0455 01/02/25 0526 01/03/25  0735   INR 2.1* 2.1* 2.5*   APTT 33.6* 33.1* 30.3     LDH:  Recent Labs   Lab 01/01/25 0455 01/02/25 0526 01/03/25  0735   * 294* 256     Microbiology:  Microbiology Results (last 7 days)       ** No results found for the last 168 hours. **            I have reviewed all pertinent labs within the past 24 hours.    Estimated Creatinine Clearance: 97.3 mL/min (based on SCr of 1.2 mg/dL).    Diagnostic Results:  I have reviewed all pertinent imaging results/findings within the past 24 hours.

## 2025-01-03 NOTE — ASSESSMENT & PLAN NOTE
Endocrinology consulted for BG management.   BG goal 140-180    - Lantus (Insulin Glargine) 10 units daily   - Novolog (Insulin Aspart) 8-11 units TIDWM (20% increase due to prandial blood glucose  above goal). Administer 8 units if patient eats 25% -  50% and 11 units if patient eats 50% or more. HOLD if patient is NPO, unable to eat, or if Blood Glucose is less than 100 mg/dL.   - [facility] SSI (150/25)  - BG checks AC/HS  - Hypoglycemia protocol in place    ** Please notify Endocrine for any change and/or advance in diet**  ** Please call Endocrine for any BG related issues **    Discharge Planning:   TBD. Please notify endocrinology prior to discharge.

## 2025-01-03 NOTE — PROGRESS NOTES
[facility]  Heart Transplant  Progress Note    Patient Name: Kevan Queen  MRN: [id number]  Admission Date: 12/31/2024  Hospital Length of Stay: 3 days  Attending Physician: Natalya Villatoro MD  Primary Care Provider: Rosio Armendariz FNP  Principal Problem:Acute decompensated heart failure    Subjective:   Interval History: No overnight events. Diuresed effectively overnight. Net I/O of -2.1L/24h. Will continue until euvolemic    Continuous Infusions:   furosemide (Lasix) 500 mg in 50 mL infusion (conc: 10 mg/mL)  40 mg/hr Intravenous Continuous 4 mL/hr at 01/03/25 0150 40 mg/hr at 01/03/25 0150     Scheduled Meds:   amiodarone  200 mg Oral Daily    amLODIPine  5 mg Oral Daily    aspirin  81 mg Oral Daily    atorvastatin  40 mg Oral Daily    cefadroxil  500 mg Oral Q12H    DULoxetine  30 mg Oral Daily    eplerenone  50 mg Oral Daily    gabapentin  100 mg Oral Daily    And    gabapentin  400 mg Oral QHS    insulin aspart U-100  8-11 Units Subcutaneous TIDWM    insulin glargine U-100  10 Units Subcutaneous Daily    levETIRAcetam  1,500 mg Oral BID    potassium chloride  40 mEq Oral BID    warfarin  1.5 mg Oral Daily     PRN Meds:  Current Facility-Administered Medications:     acetaminophen, 650 mg, Oral, Q6H PRN    dextrose 50%, 12.5 g, Intravenous, PRN    dextrose 50%, 25 g, Intravenous, PRN    glucagon (human recombinant), 1 mg, Intramuscular, PRN    glucose, 16 g, Oral, PRN    glucose, 24 g, Oral, PRN    influenza, 0.5 mL, Intramuscular, vaccine x 1 dose    insulin aspart U-100, 0-10 Units, Subcutaneous, QID (AC + HS) PRN    Review of patient's allergies indicates:   Allergen Reactions    Bumex [bumetanide] Hives     Objective:     Vital Signs (Most Recent):  Temp: 97.6 °F (36.4 °C) (01/03/25 1129)  Pulse: 91 (01/03/25 1129)  Resp: 18 (01/03/25 1129)  BP: (!) 82/0 (01/03/25 0721)  SpO2: 98 % (01/03/25 1129) Vital Signs (24h Range):  Temp:  [97.6 °F (36.4 °C)-98.9 °F (37.2 °C)] 97.6  °F (36.4 °C)  Pulse:  [71-97] 91  Resp:  [18] 18  SpO2:  [94 %-98 %] 98 %  BP: ()/(0-74) 82/0     Patient Vitals for the past 72 hrs (Last 3 readings):   Weight   01/03/25 0400 83.2 kg (183 lb 6.8 oz)   01/01/25 2337 82.5 kg (181 lb 14.1 oz)   01/01/25 1258 72.3 kg (159 lb 6.3 oz)     Body mass index is 22.93 kg/m².      Intake/Output Summary (Last 24 hours) at 1/3/2025 1149  Last data filed at 1/3/2025 0751  Gross per 24 hour   Intake 662 ml   Output 3240 ml   Net -2578 ml       Hemodynamic Parameters:       Telemetry: reviewed        Physical Exam  Vitals and nursing note reviewed.   Constitutional:       Appearance: Normal appearance.   HENT:      Head: Normocephalic.      Mouth/Throat:      Mouth: Mucous membranes are moist.   Eyes:      Pupils: Pupils are equal, round, and reactive to light.   Neck:      Comments: JVP elevation to mid-upper neck   Cardiovascular:      Comments: Smooth VAD hum   Pulmonary:      Effort: Pulmonary effort is normal.   Abdominal:      General: Bowel sounds are normal. There is no distension.      Palpations: Abdomen is soft.   Musculoskeletal:         General: Normal range of motion.      Right lower leg: Edema present.      Left lower leg: Edema present.   Skin:     General: Skin is warm.      Capillary Refill: Capillary refill takes 2 to 3 seconds.   Neurological:      General: No focal deficit present.      Mental Status: He is alert and oriented to person, place, and time.   Psychiatric:         Mood and Affect: Mood normal.         Behavior: Behavior normal.            Significant Labs:  CBC:  Recent Labs   Lab 01/01/25  0455 01/02/25  0526 01/03/25  0735   WBC 14.95* 9.20 10.09   RBC 4.34* 4.50* 4.52*   HGB 7.9* 8.2* 8.3*   HCT 27.6* 28.9* 28.9*    362 388   MCV 64* 64* 64*   MCH 18.2* 18.2* 18.4*   MCHC 28.6* 28.4* 28.7*     BNP:  Recent Labs   Lab 12/31/24  0339   *     CMP:  Recent Labs   Lab 12/31/24  0339 01/01/25  0455 01/01/25  1746 01/02/25  0827  01/02/25 2032 01/03/25  0735   * 128*   < > 140* 164* 143*   CALCIUM 8.2* 8.1*   < > 8.5* 8.4* 8.7   ALBUMIN 2.4* 2.4*  --   --   --  2.4*   PROT 8.6* 8.3  --   --   --  8.3   * 133*   < > 134* 130* 134*   K 3.7 3.2*   < > 3.9 4.8 4.0   CO2 22* 22*   < > 23 18* 24   CL 99 98   < > 99 99 99   BUN 16 14   < > 18 20 21*   CREATININE 1.1 1.2   < > 1.2 1.0 1.2   ALKPHOS 153* 154*  --   --   --  153*   ALT 7* 7*  --   --   --  6*   AST 38 26  --   --   --  22   BILITOT 1.8* 1.9*  --   --   --  2.0*    < > = values in this interval not displayed.      Coagulation:   Recent Labs   Lab 01/01/25 0455 01/02/25 0526 01/03/25  0735   INR 2.1* 2.1* 2.5*   APTT 33.6* 33.1* 30.3     LDH:  Recent Labs   Lab 01/01/25 0455 01/02/25 0526 01/03/25  0735   * 294* 256     Microbiology:  Microbiology Results (last 7 days)       ** No results found for the last 168 hours. **            I have reviewed all pertinent labs within the past 24 hours.    Estimated Creatinine Clearance: 97.3 mL/min (based on SCr of 1.2 mg/dL).    Diagnostic Results:  I have reviewed all pertinent imaging results/findings within the past 24 hours.  Assessment and Plan:     Kevan Queen is a 39 year old male with stage D 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 He underwent RVAD removal and chest closure 6/30/2022. He is off inotropes. He presents with one month hx of dyspnea and abdominal distention. Stated he takes lasix 80mg daily. He develops rash to both torsemide and bumex. He reports yesterday he noticed bilateral LE edema which prompted him to come to the ED with the assistance of his family as they drove from [place]. He reports intermittent chest pain with coughing but not occurring at  the moment. He denies any LVAD alarms. He reports eating salty foods yesterday.      On 11/15/23 underwent removal of eroded [employer] scICD--no Lifevest (due to LVAD) and no plan to replace ICD as not requiring therapy post LVAD with concern for reinfection.      Of note, he presented to [facility] ER  on 12/8 for dyspnea and hyperglycemia, felt better so was discharged.     Upon arrival to the ER today, he was hemodynamically stable. Labs demonstrated (hyponatremia Na 131), asiya Ca 9.5, Hgb 7.9 (at baseline), . He was given lasix IV 60mg and made about 300cc of UOP. He is admitted to [facility] team for further management.          * Acute decompensated heart failure  -NICM  -Last 2D Echo 8/26/24: LVEF 5-10%, LVEDD 7.2 cm  -Hypervolemic on examination today  -Current diuretic regimen: Furosemide IVP. Will continue current regimen for now.    -GDMT with home dose of Eplerenone.  Will add as tolerated   -Patient is not currently being evaluated for OHTx due to non compliance  -2g Na dietary restriction, 1500 mL fluid restriction, strict I/Os      Atrial flutter  Continue home amiodarone and anticoagulation.    Type 2 diabetes mellitus with hyperglycemia, with long-term current use of insulin  A1c 10.   Resume insulin  Endocrinology consulted for BG management    HTN (hypertension)  Resume home antihypertensive - amlodipine and epleronone.    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     LVAD (left ventricular assist device) present  -HeartMate 3 Implanted 6/23/2022 with early RV failure requiring RVAD with ProTek Duo   -Continue Coumadin,  Goal INR 2.0-3.0 . Therapeutic today.  Previous home regimen 1.5mg Qdaily  -Antiplatelets ASA 81 mg, on hold with GIB  -LDH is stable overall today. Will continue to monitor  daily.  -Speed set at 5100  -Interrogation notable for no events  -Not listed for OHTx      Procedure: Device Interrogation Including analysis of device parameters  Current Settings: Ventricular Assist Device  Review of device function is stable      1/3/2025     7:44 AM 1/3/2025     4:04 AM 1/3/2025    12:01 AM 1/2/2025     8:33 PM 1/2/2025     2:39 PM 1/2/2025     8:55 AM 1/2/2025     5:45 AM   TXP LVAD INTERROGATIONS   Type  HeartMate3 HeartMate3 HeartMate3 HeartMate3 HeartMate3 HeartMate3   Flow 4.2 4.3 4.3  4.1 4.1 3.9   Speed 5100 5100 5100  5100 5100 5100   PI 4.5 4 4.3  6 5 6.4   Power (Serna) 3.6 3.6 3.6  3.6 3.6 3.6   LSL  4700 4700    4700   Pulsatility  Intermittent pulse Intermittent pulse  Intermittent pulse  Intermittent pulse         Tobacco use  Active tobacco user.   Patient declined nicotine patch.    Anemia of chronic disease  Hgb 7.9 at baseline. Will order iron panel.     Anxiety disorder, unspecified  Resume duloxetine.        Jeff Spencer PA-C  Heart Transplant  [facility] - [facility]

## 2025-01-03 NOTE — PROGRESS NOTES
"[facility] - [facility]  Endocrinology  Progress Note    Admit Date: 12/31/2024     Reason for Consult: Management of T2DM, Hyperglycemia     Surgical Procedure and Date: LVAD 06/29/2022      Diabetes diagnosis year: 2022    Lab Results   Component Value Date    HGBA1C 10.3 (H) 08/25/2024       Home Diabetes Medications:  Levemir 20 units twice daily and Novolog 10 or 18 units with meals per patient    How often checking glucose at home? 3-4x day   BG readings on regimen: 150-200s  Hypoglycemia on the regimen?  Yes, at least once weekly; experiences blurred vision  Missed doses on regimen?  Yes, he has been out of Levemir "for a while" since it has been discontinued    Diabetes Complications include:   Hyperglycemia     Complicating diabetes co morbidities:   History of MI, CHF, and CKD      HPI:   Patient is a 39 y.o. 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 He underwent RVAD removal and chest closure 6/30/2022. He presented with one month hx of dyspnea and abdominal distention. He reported bilateral LE edema which prompted him to come to the ED with the assistance of his family as they drove from [place]. He reported intermittent chest pain with coughing. Endo consulted for BG management.            Interval HPI:   No acute events overnight. Patient in room [id number] A. Blood glucose stable. BG at and above goal on current insulin regimen (SSI, prandial, and basal insulin ). Steroid use- None.    Renal function- Normal   Lab Results   Component Value Date    CREATININE 1.0 01/02/2025     Vasopressors-  None     Endocrine will continue to follow and manage insulin orders inpatient.     Diet diabetic Cardiac (Low Na/Chol), Low " "Sodium,2gm; 2000 Calories (up to 75 gm per meal); Fluid - 1500mL     Eating:   <25%  Nausea: No  Hypoglycemia and intervention: No  Fever: No  TPN and/or TF: No  If yes, type of TF/TPN and rate: N/A    BP (!) 82/0   Pulse 90   Temp 98.3 °F (36.8 °C) (Oral)   Resp 18   Ht 6' 3" (1.905 m)   Wt 83.2 kg (183 lb 6.8 oz)   SpO2 98%   BMI 22.93 kg/m²     Labs Reviewed and Include    Recent Labs   Lab 01/02/25 2032   *   CALCIUM 8.4*   *   K 4.8   CO2 18*   CL 99   BUN 20   CREATININE 1.0     Lab Results   Component Value Date    WBC 10.09 01/03/2025    HGB 8.3 (L) 01/03/2025    HCT 28.9 (L) 01/03/2025    MCV 64 (L) 01/03/2025     01/03/2025     No results for input(s): "TSH", "FREET4" in the last 168 hours.  Lab Results   Component Value Date    HGBA1C 10.6 (H) 12/31/2024       Nutritional status:   Body mass index is 22.93 kg/m².  Lab Results   Component Value Date    ALBUMIN 2.4 (L) 01/01/2025    ALBUMIN 2.4 (L) 12/31/2024    ALBUMIN 2.4 (L) 12/08/2024     Lab Results   Component Value Date    PREALBUMIN 9 (L) 01/01/2025    PREALBUMIN 12 (L) 11/21/2024    PREALBUMIN 14 (L) 09/27/2024       Estimated Creatinine Clearance: 116.7 mL/min (based on SCr of 1 mg/dL).    Accu-Checks  Recent Labs     12/31/24  1756 12/31/24  2045 01/01/25  0614 01/01/25  1256 01/01/25  1737 01/01/25  2015 01/02/25  0909 01/02/25  1243 01/02/25  1618 01/02/25  2109   POCTGLUCOSE 183* 311* 143* 216* 173* 173* 166* 172* 198* 180*       Current Medications and/or Treatments Impacting Glycemic Control  Immunotherapy:    Immunosuppressants       None          Steroids:   Hormones (From admission, onward)      None          Pressors:    Autonomic Drugs (From admission, onward)      None          Hyperglycemia/Diabetes Medications:   Antihyperglycemics (From admission, onward)      Start     Stop Route Frequency Ordered    01/03/25 1130  insulin aspart U-100 pen 8-11 Units         -- SubQ 3 times daily with meals 01/03/25 0841 "    01/01/25 0900  insulin glargine U-100 (Lantus) pen 10 Units         -- SubQ Daily 01/01/25 0813    12/31/24 1525  insulin aspart U-100 pen 0-10 Units         -- SubQ Before meals & nightly PRN 12/31/24 1426            ASSESSMENT and PLAN    Cardiac/Vascular  * Acute decompensated heart failure  Managed by primary team  Optimize blood glucose control        HTN (hypertension)  Uncontrolled HTN can worsen insulin resistance.         LVAD (left ventricular assist device) present  Managed by primary team      Endocrine  Type 2 diabetes mellitus with hyperglycemia, with long-term current use of insulin  Endocrinology consulted for BG management.   BG goal 140-180    - Lantus (Insulin Glargine) 10 units daily   - Novolog (Insulin Aspart) 8-11 units TIDWM (20% increase due to prandial blood glucose  above goal). Administer 8 units if patient eats 25% -  50% and 11 units if patient eats 50% or more. HOLD if patient is NPO, unable to eat, or if Blood Glucose is less than 100 mg/dL.   - [facility] SSI (150/25)  - BG checks AC/HS  - Hypoglycemia protocol in place    ** Please notify Endocrine for any change and/or advance in diet**  ** Please call Endocrine for any BG related issues **    Discharge Planning:   TBD. Please notify endocrinology prior to discharge.            Guera De Oliveira PA-C  Endocrinology  [facility]

## 2025-01-03 NOTE — PLAN OF CARE
Pt maintained free from falls/trauma/injuries and skin breakdown. Pt denied pain or discomfort. Plan of care reviewed. Pt verbalized understanding. All questions and concerns addressed.     Problem: Ventricular Assist Device  Goal: Optimal Adjustment to Device  Outcome: Progressing  Goal: Absence of Bleeding  Outcome: Progressing  Goal: Absence of Embolism Signs and Symptoms  Outcome: Progressing  Goal: Optimal Blood Flow  Outcome: Progressing  Goal: Absence of Infection Signs and Symptoms  Outcome: Progressing  Goal: Effective Right-Sided Heart Function  Outcome: Progressing     Problem: Adult Inpatient Plan of Care  Goal: Plan of Care Review  Outcome: Progressing  Goal: Patient-Specific Goal (Individualized)  Outcome: Progressing  Goal: Absence of Hospital-Acquired Illness or Injury  Outcome: Progressing  Goal: Optimal Comfort and Wellbeing  Outcome: Progressing  Goal: Readiness for Transition of Care  Outcome: Progressing     Problem: Diabetes Comorbidity  Goal: Blood Glucose Level Within Targeted Range  Outcome: Progressing     Problem: Sepsis/Septic Shock  Goal: Optimal Coping  Outcome: Progressing  Goal: Absence of Bleeding  Outcome: Progressing  Goal: Blood Glucose Level Within Targeted Range  Outcome: Progressing  Goal: Absence of Infection Signs and Symptoms  Outcome: Progressing  Goal: Optimal Nutrition Intake  Outcome: Progressing     Problem: Fall Injury Risk  Goal: Absence of Fall and Fall-Related Injury  Outcome: Progressing     Problem: Heart Failure  Goal: Optimal Coping  Outcome: Progressing  Goal: Optimal Cardiac Output  Outcome: Progressing  Goal: Stable Heart Rate and Rhythm  Outcome: Progressing  Goal: Optimal Functional Ability  Outcome: Progressing  Goal: Fluid and Electrolyte Balance  Outcome: Progressing  Goal: Improved Oral Intake  Outcome: Progressing  Goal: Effective Oxygenation and Ventilation  Outcome: Progressing  Goal: Effective Breathing Pattern During Sleep  Outcome: Progressing

## 2025-01-03 NOTE — ASSESSMENT & PLAN NOTE
-HeartMate 3 Implanted 6/23/2022 with early RV failure requiring RVAD with ProTek Duo   -Continue Coumadin,  Goal INR 2.0-3.0 . Therapeutic today.  Previous home regimen 1.5mg Qdaily  -Antiplatelets ASA 81 mg, on hold with GIB  -LDH is stable overall today. Will continue to monitor daily.  -Speed set at 5100  -Interrogation notable for no events  -Not listed for OHTx      Procedure: Device Interrogation Including analysis of device parameters  Current Settings: Ventricular Assist Device  Review of device function is stable      1/3/2025     7:44 AM 1/3/2025     4:04 AM 1/3/2025    12:01 AM 1/2/2025     8:33 PM 1/2/2025     2:39 PM 1/2/2025     8:55 AM 1/2/2025     5:45 AM   TXP LVAD INTERROGATIONS   Type  HeartMate3 HeartMate3 HeartMate3 HeartMate3 HeartMate3 HeartMate3   Flow 4.2 4.3 4.3  4.1 4.1 3.9   Speed 5100 5100 5100  5100 5100 5100   PI 4.5 4 4.3  6 5 6.4   Power (Serna) 3.6 3.6 3.6  3.6 3.6 3.6   LSL  4700 4700    4700   Pulsatility  Intermittent pulse Intermittent pulse  Intermittent pulse  Intermittent pulse

## 2025-01-03 NOTE — PROGRESS NOTES
01/03/2025  John Alaniz    Current provider:  Natalya Villatoro MD    Device interrogation:      1/3/2025     7:44 AM 1/3/2025     4:04 AM 1/3/2025    12:01 AM 1/2/2025     8:33 PM 1/2/2025     2:39 PM 1/2/2025     8:55 AM 1/2/2025     5:45 AM   TXP LVAD INTERROGATIONS   Type  HeartMate3 HeartMate3 HeartMate3 HeartMate3 HeartMate3 HeartMate3   Flow 4.2 4.3 4.3  4.1 4.1 3.9   Speed 5100 5100 5100  5100 5100 5100   PI 4.5 4 4.3  6 5 6.4   Power (Serna) 3.6 3.6 3.6  3.6 3.6 3.6   LSL  4700 4700    4700   Pulsatility  Intermittent pulse Intermittent pulse  Intermittent pulse  Intermittent pulse          Rounded on Kevan Queen to ensure all mechanical assist device settings (IABP or VAD) were appropriate and all parameters were within limits.  I was able to ensure all back up equipment was present, the staff had no issues, and the Perfusion Department daily rounding was complete.      For implantable VADs: Interrogation of Ventricular assist device was performed with analysis of device parameters and review of device function. I have personally reviewed the interrogation findings and agree with findings as stated.     In emergency, the nursing units have been notified to contact the perfusion department either by:  Calling [phone] from 630am to 4pm Mon thru Fri, utilizing the On-Call Finder functionality of Epic and searching for Perfusion, or by contacting the hospital  from 4pm to 630am and on weekends and asking to speak with the perfusionist on call.    8:19 AM

## 2025-01-04 LAB
ANION GAP SERPL CALC-SCNC: 10 MMOL/L (ref 8–16)
ANION GAP SERPL CALC-SCNC: 13 MMOL/L (ref 8–16)
APTT PPP: 31.2 SEC (ref 21–32)
BASOPHILS # BLD AUTO: 0.01 K/UL (ref 0–0.2)
BASOPHILS NFR BLD: 0.1 % (ref 0–1.9)
BUN SERPL-MCNC: 19 MG/DL (ref 6–20)
BUN SERPL-MCNC: 21 MG/DL (ref 6–20)
CALCIUM SERPL-MCNC: 8.5 MG/DL (ref 8.7–10.5)
CALCIUM SERPL-MCNC: 8.7 MG/DL (ref 8.7–10.5)
CHLORIDE SERPL-SCNC: 98 MMOL/L (ref 95–110)
CHLORIDE SERPL-SCNC: 99 MMOL/L (ref 95–110)
CO2 SERPL-SCNC: 22 MMOL/L (ref 23–29)
CO2 SERPL-SCNC: 23 MMOL/L (ref 23–29)
CREAT SERPL-MCNC: 1.1 MG/DL (ref 0.5–1.4)
CREAT SERPL-MCNC: 1.2 MG/DL (ref 0.5–1.4)
DIFFERENTIAL METHOD BLD: ABNORMAL
EOSINOPHIL # BLD AUTO: 0 K/UL (ref 0–0.5)
EOSINOPHIL NFR BLD: 0.5 % (ref 0–8)
ERYTHROCYTE [DISTWIDTH] IN BLOOD BY AUTOMATED COUNT: 24.4 % (ref 11.5–14.5)
EST. GFR  (NO RACE VARIABLE): >60 ML/MIN/1.73 M^2
EST. GFR  (NO RACE VARIABLE): >60 ML/MIN/1.73 M^2
GLUCOSE SERPL-MCNC: 129 MG/DL (ref 70–110)
GLUCOSE SERPL-MCNC: 162 MG/DL (ref 70–110)
HCT VFR BLD AUTO: 28 % (ref 40–54)
HGB BLD-MCNC: 8.3 G/DL (ref 14–18)
IMM GRANULOCYTES # BLD AUTO: 0.05 K/UL (ref 0–0.04)
IMM GRANULOCYTES NFR BLD AUTO: 0.6 % (ref 0–0.5)
INR PPP: 2.5 (ref 0.8–1.2)
LDH SERPL L TO P-CCNC: 243 U/L (ref 110–260)
LYMPHOCYTES # BLD AUTO: 1.1 K/UL (ref 1–4.8)
LYMPHOCYTES NFR BLD: 12.3 % (ref 18–48)
MAGNESIUM SERPL-MCNC: 1.9 MG/DL (ref 1.6–2.6)
MCH RBC QN AUTO: 18.6 PG (ref 27–31)
MCHC RBC AUTO-ENTMCNC: 29.6 G/DL (ref 32–36)
MCV RBC AUTO: 63 FL (ref 82–98)
MONOCYTES # BLD AUTO: 0.8 K/UL (ref 0.3–1)
MONOCYTES NFR BLD: 9 % (ref 4–15)
NEUTROPHILS # BLD AUTO: 6.7 K/UL (ref 1.8–7.7)
NEUTROPHILS NFR BLD: 77.5 % (ref 38–73)
NRBC BLD-RTO: 0 /100 WBC
PHOSPHATE SERPL-MCNC: 3.5 MG/DL (ref 2.7–4.5)
PLATELET # BLD AUTO: 378 K/UL (ref 150–450)
PMV BLD AUTO: 9.4 FL (ref 9.2–12.9)
POCT GLUCOSE: 141 MG/DL (ref 70–110)
POCT GLUCOSE: 144 MG/DL (ref 70–110)
POCT GLUCOSE: 157 MG/DL (ref 70–110)
POCT GLUCOSE: 219 MG/DL (ref 70–110)
POTASSIUM SERPL-SCNC: 3.7 MMOL/L (ref 3.5–5.1)
POTASSIUM SERPL-SCNC: 4.2 MMOL/L (ref 3.5–5.1)
PROTHROMBIN TIME: 25.5 SEC (ref 9–12.5)
RBC # BLD AUTO: 4.46 M/UL (ref 4.6–6.2)
SODIUM SERPL-SCNC: 132 MMOL/L (ref 136–145)
SODIUM SERPL-SCNC: 133 MMOL/L (ref 136–145)
WBC # BLD AUTO: 8.62 K/UL (ref 3.9–12.7)

## 2025-01-04 PROCEDURE — 27000248 HC VAD-ADDITIONAL DAY

## 2025-01-04 PROCEDURE — 25000003 PHARM REV CODE 250: Performed by: STUDENT IN AN ORGANIZED HEALTH CARE EDUCATION/TRAINING PROGRAM

## 2025-01-04 PROCEDURE — 36415 COLL VENOUS BLD VENIPUNCTURE: CPT | Performed by: STUDENT IN AN ORGANIZED HEALTH CARE EDUCATION/TRAINING PROGRAM

## 2025-01-04 PROCEDURE — 4B02XSZ MEASUREMENT OF CARDIAC PACEMAKER, EXTERNAL APPROACH: ICD-10-PCS | Performed by: INTERNAL MEDICINE

## 2025-01-04 PROCEDURE — 20600001 HC STEP DOWN PRIVATE ROOM

## 2025-01-04 PROCEDURE — 85610 PROTHROMBIN TIME: CPT | Performed by: STUDENT IN AN ORGANIZED HEALTH CARE EDUCATION/TRAINING PROGRAM

## 2025-01-04 PROCEDURE — 85025 COMPLETE CBC W/AUTO DIFF WBC: CPT | Performed by: STUDENT IN AN ORGANIZED HEALTH CARE EDUCATION/TRAINING PROGRAM

## 2025-01-04 PROCEDURE — 63600175 PHARM REV CODE 636 W HCPCS

## 2025-01-04 PROCEDURE — 85730 THROMBOPLASTIN TIME PARTIAL: CPT | Performed by: STUDENT IN AN ORGANIZED HEALTH CARE EDUCATION/TRAINING PROGRAM

## 2025-01-04 PROCEDURE — 25000003 PHARM REV CODE 250

## 2025-01-04 PROCEDURE — 84100 ASSAY OF PHOSPHORUS: CPT | Performed by: STUDENT IN AN ORGANIZED HEALTH CARE EDUCATION/TRAINING PROGRAM

## 2025-01-04 PROCEDURE — 93750 INTERROGATION VAD IN PERSON: CPT | Mod: ,,, | Performed by: INTERNAL MEDICINE

## 2025-01-04 PROCEDURE — 83615 LACTATE (LD) (LDH) ENZYME: CPT | Performed by: STUDENT IN AN ORGANIZED HEALTH CARE EDUCATION/TRAINING PROGRAM

## 2025-01-04 PROCEDURE — 83735 ASSAY OF MAGNESIUM: CPT | Performed by: STUDENT IN AN ORGANIZED HEALTH CARE EDUCATION/TRAINING PROGRAM

## 2025-01-04 PROCEDURE — 99232 SBSQ HOSP IP/OBS MODERATE 35: CPT | Mod: ,,,

## 2025-01-04 PROCEDURE — 99233 SBSQ HOSP IP/OBS HIGH 50: CPT | Mod: ,,, | Performed by: INTERNAL MEDICINE

## 2025-01-04 PROCEDURE — 80048 BASIC METABOLIC PNL TOTAL CA: CPT | Mod: 91

## 2025-01-04 PROCEDURE — 21400001 HC TELEMETRY ROOM

## 2025-01-04 PROCEDURE — 36415 COLL VENOUS BLD VENIPUNCTURE: CPT

## 2025-01-04 PROCEDURE — 4A02X4Z MEASUREMENT OF CARDIAC ELECTRICAL ACTIVITY, EXTERNAL APPROACH: ICD-10-PCS | Performed by: INTERNAL MEDICINE

## 2025-01-04 RX ORDER — AMLODIPINE BESYLATE 10 MG/1
10 TABLET ORAL DAILY
Status: DISCONTINUED | OUTPATIENT
Start: 2025-01-05 | End: 2025-01-09 | Stop reason: HOSPADM

## 2025-01-04 RX ORDER — AMLODIPINE BESYLATE 5 MG/1
5 TABLET ORAL ONCE
Status: COMPLETED | OUTPATIENT
Start: 2025-01-04 | End: 2025-01-04

## 2025-01-04 RX ORDER — MAGNESIUM SULFATE HEPTAHYDRATE 40 MG/ML
2 INJECTION, SOLUTION INTRAVENOUS ONCE
Status: COMPLETED | OUTPATIENT
Start: 2025-01-04 | End: 2025-01-04

## 2025-01-04 RX ADMIN — POTASSIUM CHLORIDE 40 MEQ: 1500 TABLET, EXTENDED RELEASE ORAL at 08:01

## 2025-01-04 RX ADMIN — INSULIN GLARGINE 10 UNITS: 100 INJECTION, SOLUTION SUBCUTANEOUS at 09:01

## 2025-01-04 RX ADMIN — AMIODARONE HYDROCHLORIDE 200 MG: 200 TABLET ORAL at 09:01

## 2025-01-04 RX ADMIN — FUROSEMIDE 40 MG/HR: 10 INJECTION, SOLUTION INTRAMUSCULAR; INTRAVENOUS at 02:01

## 2025-01-04 RX ADMIN — ASPIRIN 81 MG: 81 TABLET, COATED ORAL at 09:01

## 2025-01-04 RX ADMIN — MAGNESIUM SULFATE HEPTAHYDRATE 2 G: 40 INJECTION, SOLUTION INTRAVENOUS at 12:01

## 2025-01-04 RX ADMIN — GABAPENTIN 400 MG: 400 CAPSULE ORAL at 08:01

## 2025-01-04 RX ADMIN — LEVETIRACETAM 1500 MG: 500 TABLET, FILM COATED ORAL at 08:01

## 2025-01-04 RX ADMIN — EPLERENONE 50 MG: 25 TABLET, FILM COATED ORAL at 09:01

## 2025-01-04 RX ADMIN — WARFARIN SODIUM 1.5 MG: 5 TABLET ORAL at 05:01

## 2025-01-04 RX ADMIN — DULOXETINE HYDROCHLORIDE 30 MG: 30 CAPSULE, DELAYED RELEASE ORAL at 09:01

## 2025-01-04 RX ADMIN — GABAPENTIN 100 MG: 100 CAPSULE ORAL at 09:01

## 2025-01-04 RX ADMIN — LEVETIRACETAM 1500 MG: 500 TABLET, FILM COATED ORAL at 09:01

## 2025-01-04 RX ADMIN — POTASSIUM CHLORIDE 40 MEQ: 1500 TABLET, EXTENDED RELEASE ORAL at 09:01

## 2025-01-04 RX ADMIN — AMLODIPINE BESYLATE 5 MG: 5 TABLET ORAL at 02:01

## 2025-01-04 RX ADMIN — INSULIN ASPART 4 UNITS: 100 INJECTION, SOLUTION INTRAVENOUS; SUBCUTANEOUS at 01:01

## 2025-01-04 RX ADMIN — AMLODIPINE BESYLATE 5 MG: 5 TABLET ORAL at 09:01

## 2025-01-04 RX ADMIN — CEFADROXIL 500 MG: 500 CAPSULE ORAL at 08:01

## 2025-01-04 RX ADMIN — ATORVASTATIN CALCIUM 40 MG: 40 TABLET, FILM COATED ORAL at 09:01

## 2025-01-04 RX ADMIN — CEFADROXIL 500 MG: 500 CAPSULE ORAL at 11:01

## 2025-01-04 RX ADMIN — INSULIN ASPART 8 UNITS: 100 INJECTION, SOLUTION INTRAVENOUS; SUBCUTANEOUS at 01:01

## 2025-01-04 NOTE — PROGRESS NOTES
[facility] - Cardiac Intensive Care  Heart Transplant  Progress Note    Patient Name: Kevan Queen  MRN: [id number]  Admission Date: 12/31/2024  Hospital Length of Stay: 4 days  Attending Physician: Natalya Villatoro MD  Primary Care Provider: Rosio Armendariz FNP  Principal Problem:Acute decompensated heart failure    Subjective:   Interval History: No overnight events. Diuresed effectively overnight. Net I/O of -3.2L/24h. Will continue until euvolemic.     Continuous Infusions:   furosemide (Lasix) 500 mg in 50 mL infusion (conc: 10 mg/mL)  40 mg/hr Intravenous Continuous 4 mL/hr at 01/04/25 0207 40 mg/hr at 01/04/25 0207     Scheduled Meds:   amiodarone  200 mg Oral Daily    amLODIPine  5 mg Oral Daily    aspirin  81 mg Oral Daily    atorvastatin  40 mg Oral Daily    cefadroxil  500 mg Oral Q12H    DULoxetine  30 mg Oral Daily    eplerenone  50 mg Oral Daily    gabapentin  100 mg Oral Daily    And    gabapentin  400 mg Oral QHS    insulin aspart U-100  8-11 Units Subcutaneous TIDWM    insulin glargine U-100  10 Units Subcutaneous Daily    levETIRAcetam  1,500 mg Oral BID    mupirocin   Nasal BID    potassium chloride  40 mEq Oral BID    warfarin  1.5 mg Oral Daily     PRN Meds:  Current Facility-Administered Medications:     acetaminophen, 650 mg, Oral, Q6H PRN    dextrose 50%, 12.5 g, Intravenous, PRN    dextrose 50%, 25 g, Intravenous, PRN    glucagon (human recombinant), 1 mg, Intramuscular, PRN    glucose, 16 g, Oral, PRN    glucose, 24 g, Oral, PRN    influenza, 0.5 mL, Intramuscular, vaccine x 1 dose    insulin aspart U-100, 0-10 Units, Subcutaneous, QID (AC + HS) PRN    Review of patient's allergies indicates:   Allergen Reactions    Bumex [bumetanide] Hives     Objective:     Vital Signs (Most Recent):  Temp: 97.7 °F (36.5 °C) (01/04/25 1129)  Pulse: 88 (01/04/25 1129)  Resp: 18 (01/04/25 1129)  BP: (!) 90/0 (01/04/25 1116)  SpO2: 98 % (01/04/25 1129) Vital Signs (24h Range):  Temp:  [97.6 °F (36.4  °C)-98 °F (36.7 °C)] 97.7 °F (36.5 °C)  Pulse:  [] 88  Resp:  [18] 18  SpO2:  [95 %-98 %] 98 %  BP: (80-90)/(0) 90/0     Patient Vitals for the past 72 hrs (Last 3 readings):   Weight   01/03/25 0400 83.2 kg (183 lb 6.8 oz)   01/01/25 2337 82.5 kg (181 lb 14.1 oz)   01/01/25 1258 72.3 kg (159 lb 6.3 oz)     Body mass index is 22.93 kg/m².      Intake/Output Summary (Last 24 hours) at 1/4/2025 1147  Last data filed at 1/4/2025 1112  Gross per 24 hour   Intake 1498 ml   Output 4475 ml   Net -2977 ml       Hemodynamic Parameters:       Telemetry: reviewed        Physical Exam  Vitals and nursing note reviewed.   Constitutional:       Appearance: Normal appearance.   HENT:      Head: Normocephalic.      Mouth/Throat:      Mouth: Mucous membranes are moist.   Eyes:      Pupils: Pupils are equal, round, and reactive to light.   Neck:      Comments: JVP elevation to mid-upper neck   Cardiovascular:      Comments: Smooth VAD hum   Pulmonary:      Effort: Pulmonary effort is normal.   Abdominal:      General: Bowel sounds are normal. There is no distension.      Palpations: Abdomen is soft.   Musculoskeletal:         General: Normal range of motion.      Right lower leg: Edema present.      Left lower leg: Edema present.   Skin:     General: Skin is warm.      Capillary Refill: Capillary refill takes 2 to 3 seconds.   Neurological:      General: No focal deficit present.      Mental Status: He is alert and oriented to person, place, and time.   Psychiatric:         Mood and Affect: Mood normal.         Behavior: Behavior normal.            Significant Labs:  CBC:  Recent Labs   Lab 01/02/25  0526 01/03/25  0735 01/04/25  0406   WBC 9.20 10.09 8.62   RBC 4.50* 4.52* 4.46*   HGB 8.2* 8.3* 8.3*   HCT 28.9* 28.9* 28.0*    388 378   MCV 64* 64* 63*   MCH 18.2* 18.4* 18.6*   MCHC 28.4* 28.7* 29.6*     BNP:  Recent Labs   Lab 12/31/24  0339   *     CMP:  Recent Labs   Lab 12/31/24  0339 01/01/25  0459  01/01/25  1746 01/03/25  0735 01/03/25  1114 01/03/25  1745 01/04/25  0819   * 128*   < > 143* 199* 179* 129*   CALCIUM 8.2* 8.1*   < > 8.7 8.6* 8.6* 8.7   ALBUMIN 2.4* 2.4*  --  2.4*  --   --   --    PROT 8.6* 8.3  --  8.3  --   --   --    * 133*   < > 134* 132* 132* 133*   K 3.7 3.2*   < > 4.0 4.0 4.1 3.7   CO2 22* 22*   < > 24 22* 26 22*   CL 99 98   < > 99 97 99 98   BUN 16 14   < > 21* 20 21* 19   CREATININE 1.1 1.2   < > 1.2 1.2 1.2 1.1   ALKPHOS 153* 154*  --  153*  --   --   --    ALT 7* 7*  --  6*  --   --   --    AST 38 26  --  22  --   --   --    BILITOT 1.8* 1.9*  --  2.0*  --   --   --     < > = values in this interval not displayed.      Coagulation:   Recent Labs   Lab 01/02/25  0526 01/03/25  0735 01/04/25  0406   INR 2.1* 2.5* 2.5*   APTT 33.1* 30.3 31.2     LDH:  Recent Labs   Lab 01/02/25  0526 01/03/25  0735 01/04/25  0406   * 256 243     Microbiology:  Microbiology Results (last 7 days)       ** No results found for the last 168 hours. **            I have reviewed all pertinent labs within the past 24 hours.    Estimated Creatinine Clearance: 106.1 mL/min (based on SCr of 1.1 mg/dL).    Diagnostic Results:  I have reviewed all pertinent imaging results/findings within the past 24 hours.  Assessment and Plan:     Kevan Queen is a 39 year old male with stage D 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 He underwent RVAD removal and chest closure 6/30/2022. He is off inotropes. He presents with one month hx of dyspnea and abdominal distention. Stated he takes lasix 80mg daily. He develops rash to both torsemide and bumex. He reports yesterday he noticed bilateral LE edema which prompted him to come to the ED with the assistance of his  family as they drove from [place]. He reports intermittent chest pain with coughing but not occurring at the moment. He denies any LVAD alarms. He reports eating salty foods yesterday.      On 11/15/23 underwent removal of eroded [employer] scICD--no Lifevest (due to LVAD) and no plan to replace ICD as not requiring therapy post LVAD with concern for reinfection.      Of note, he presented to [facility] ER  on 12/8 for dyspnea and hyperglycemia, felt better so was discharged.     Upon arrival to the ER today, he was hemodynamically stable. Labs demonstrated (hyponatremia Na 131), asiya Ca 9.5, Hgb 7.9 (at baseline), . He was given lasix IV 60mg and made about 300cc of UOP. He is admitted to HTS team for further management.          * Acute decompensated heart failure  -NICM  -Last 2D Echo 8/26/24: LVEF 5-10%, LVEDD 7.2 cm  -Hypervolemic on examination today  -Current diuretic regimen: Furosemide IVP. Will continue current regimen for now.    -GDMT with home dose of Eplerenone.  Will add as tolerated   -Patient is not currently being evaluated for OHTx due to non compliance  -2g Na dietary restriction, 1500 mL fluid restriction, strict I/Os      Atrial flutter  Continue home amiodarone and anticoagulation.    LVAD (left ventricular assist device) present  -HeartMate 3 Implanted 6/23/2022 with early RV failure requiring RVAD with ProTek Duo   -Continue Coumadin,  Goal INR 2.0-3.0 . Therapeutic today.  Previous home regimen 1.5mg Qdaily  -Antiplatelets ASA 81 mg, on hold with GIB  -LDH is stable overall today. Will continue to monitor daily.  -Speed set at 5100  -Interrogation notable for no events  -Not listed for OHTx      Procedure: Device Interrogation Including analysis of device parameters  Current Settings: Ventricular Assist Device  Review of device function is stable      1/3/2025     7:44 AM 1/3/2025     4:04 AM 1/3/2025    12:01 AM 1/2/2025     8:33 PM 1/2/2025     2:39 PM 1/2/2025      8:55 AM 1/2/2025     5:45 AM   TXP LVAD INTERROGATIONS   Type  HeartMate3 HeartMate3 HeartMate3 HeartMate3 HeartMate3 HeartMate3   Flow 4.2 4.3 4.3  4.1 4.1 3.9   Speed 5100 5100 5100  5100 5100 5100   PI 4.5 4 4.3  6 5 6.4   Power (Serna) 3.6 3.6 3.6  3.6 3.6 3.6   LSL  4700 4700    4700   Pulsatility  Intermittent pulse Intermittent pulse  Intermittent pulse  Intermittent pulse         Type 2 diabetes mellitus with hyperglycemia, with long-term current use of insulin  A1c 10.   Resume insulin  Endocrinology consulted for BG management    HTN (hypertension)  Resume home antihypertensive - amlodipine and epleronone.    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     Tobacco use  Active tobacco user.   Patient declined nicotine patch.    Anemia of chronic disease  Hgb 7.9 at baseline. Will order iron panel.     Anxiety disorder, unspecified  Resume duloxetine.        Alice Ayala MD  Heart Transplant  [facility] - Cardiac Intensive [facility]

## 2025-01-04 NOTE — PROGRESS NOTES
01/04/2025  Michael Montes    Current provider:  Natalya Villatoro MD    Device interrogation:      1/4/2025     8:22 AM 1/4/2025     4:11 AM 1/3/2025    11:33 PM 1/3/2025     8:44 PM 1/3/2025     6:34 PM 1/3/2025     5:55 PM 1/3/2025    12:00 PM   TXP LVAD INTERROGATIONS   Type HeartMate3 HeartMate3 HeartMate3 HeartMate3 HeartMate3 HeartMate3 HeartMate3   Flow 4.4 4.2 4.2 4.1  4.1 4.3   Speed 5150 5100 5100 5100  5100 5100   PI 4.6 4.4 4.8 5.4  4 4.5   Power (Serna) 3.6 3.6 3.6 3.6  3.5 3.5   LSL 4700 4700 4700 4700      Pulsatility No Pulse Intermittent pulse Intermittent pulse Intermittent pulse             Rounded on Kevan Queen to ensure all mechanical assist device settings (IABP or VAD) were appropriate and all parameters were within limits.  I was able to ensure all back up equipment was present, the staff had no issues, and the Perfusion Department daily rounding was complete.      For implantable VADs: Interrogation of Ventricular assist device was performed with analysis of device parameters and review of device function. I have personally reviewed the interrogation findings and agree with findings as stated.     In emergency, the nursing units have been notified to contact the perfusion department either by:  Calling [phone] from 630am to 4pm Mon thru Fri, utilizing the On-Call Finder functionality of Epic and searching for Perfusion, or by contacting the hospital  from 4pm to 630am and on weekends and asking to speak with the perfusionist on call.    10:43 AM

## 2025-01-04 NOTE — NURSING
LVAD dressing change completed using sterile technique with daily kit. DLES is a 2 with scant to  minimal drainage noted on the drain sponge. Tolerated without any complication. no redness, or tenderness noted.

## 2025-01-04 NOTE — PLAN OF CARE
Plan of care reviewed with patient. Patient verbalized understanding of care. Patient remained free of falls and injuries. Patient is currently resting, with no complaints at this moment. Urinal and call light within patient's reach.   Problem: Ventricular Assist Device  Goal: Optimal Adjustment to Device  Outcome: Progressing  Goal: Absence of Bleeding  Outcome: Progressing  Goal: Absence of Embolism Signs and Symptoms  Outcome: Progressing  Goal: Optimal Blood Flow  Outcome: Progressing  Goal: Absence of Infection Signs and Symptoms  Outcome: Progressing  Goal: Effective Right-Sided Heart Function  Outcome: Progressing     Problem: Adult Inpatient Plan of Care  Goal: Plan of Care Review  Outcome: Progressing  Goal: Patient-Specific Goal (Individualized)  Outcome: Progressing  Goal: Absence of Hospital-Acquired Illness or Injury  Outcome: Progressing  Goal: Optimal Comfort and Wellbeing  Outcome: Progressing  Goal: Readiness for Transition of Care  Outcome: Progressing     Problem: Diabetes Comorbidity  Goal: Blood Glucose Level Within Targeted Range  Outcome: Progressing     Problem: Fall Injury Risk  Goal: Absence of Fall and Fall-Related Injury  Outcome: Progressing     Problem: Heart Failure  Goal: Optimal Coping  Outcome: Progressing  Goal: Optimal Cardiac Output  Outcome: Progressing  Goal: Stable Heart Rate and Rhythm  Outcome: Progressing  Goal: Optimal Functional Ability  Outcome: Progressing  Goal: Fluid and Electrolyte Balance  Outcome: Progressing  Goal: Improved Oral Intake  Outcome: Progressing  Goal: Effective Oxygenation and Ventilation  Outcome: Progressing  Goal: Effective Breathing Pattern During Sleep  Outcome: Progressing

## 2025-01-04 NOTE — PLAN OF CARE
Plan of care discussed with patient.  Patient ambulating independently, fall precautions in place.   LVAD doppler elevated, see previous note. LVAD numbers WNL,  smooth LVAD hum.   Lasix gtt infusing per orders. Magnesium replaced per orders.   Patient has no complaints of pain. Discussed medications and care. Patient has no questions at this time.       Problem: Ventricular Assist Device  Goal: Absence of Bleeding  Outcome: Progressing  Intervention: Monitor and Manage Bleeding  Flowsheets (Taken 1/4/2025 1728)  Bleeding Precautions:   blood pressure closely monitored   coagulation study results reviewed  Bleeding Management: dressing monitored  Goal: Absence of Embolism Signs and Symptoms  Outcome: Progressing  Intervention: Prevent or Manage Embolism  Flowsheets (Taken 1/4/2025 1728)  VTE Prevention/Management:   ambulation promoted   bleeding precautions maintained   bleeding risk assessed   dorsiflexion/plantar flexion performed   ROM (active) performed     Problem: Adult Inpatient Plan of Care  Goal: Absence of Hospital-Acquired Illness or Injury  Outcome: Progressing  Intervention: Identify and Manage Fall Risk  Flowsheets (Taken 1/4/2025 1728)  Safety Promotion/Fall Prevention:   assistive device/personal item within reach   commode/urinal/bedpan at bedside   Fall Risk reviewed with patient/family   Fall Risk signage in place   instructed to call staff for mobility   medications reviewed   nonskid shoes/socks when out of bed   patient expresses understanding of fall risk and prevention   room near unit station   side rails raised x 2  Intervention: Prevent Skin Injury  Flowsheets (Taken 1/4/2025 1728)  Body Position: position changed independently  Skin Protection:   incontinence pads utilized   transparent dressing maintained  Device Skin Pressure Protection: positioning supports utilized  Intervention: Prevent and Manage VTE (Venous Thromboembolism) Risk  Flowsheets (Taken 1/4/2025 1728)  VTE  Prevention/Management:   ambulation promoted   bleeding precautions maintained   bleeding risk assessed   dorsiflexion/plantar flexion performed   ROM (active) performed  Intervention: Prevent Infection  Flowsheets (Taken 1/4/2025 1728)  Infection Prevention:   equipment surfaces disinfected   hand hygiene promoted   rest/sleep promoted   single patient room provided     Problem: Sepsis/Septic Shock  Goal: Absence of Infection Signs and Symptoms  Outcome: Progressing  Intervention: Initiate Sepsis Management  Flowsheets (Taken 1/4/2025 1728)  Infection Prevention:   equipment surfaces disinfected   hand hygiene promoted   rest/sleep promoted   single patient room provided  Isolation Precautions:   precautions maintained   protective     Problem: Fall Injury Risk  Goal: Absence of Fall and Fall-Related Injury  Outcome: Progressing  Intervention: Identify and Manage Contributors  Flowsheets (Taken 1/4/2025 1728)  Self-Care Promotion:   independence encouraged   BADL personal objects within reach  Medication Review/Management: medications reviewed

## 2025-01-04 NOTE — SUBJECTIVE & OBJECTIVE
Interval History: No overnight events. Diuresed effectively overnight. Net I/O of -3.2L/24h. Will continue until euvolemic.     Continuous Infusions:   furosemide (Lasix) 500 mg in 50 mL infusion (conc: 10 mg/mL)  40 mg/hr Intravenous Continuous 4 mL/hr at 01/04/25 0207 40 mg/hr at 01/04/25 0207     Scheduled Meds:   amiodarone  200 mg Oral Daily    amLODIPine  5 mg Oral Daily    aspirin  81 mg Oral Daily    atorvastatin  40 mg Oral Daily    cefadroxil  500 mg Oral Q12H    DULoxetine  30 mg Oral Daily    eplerenone  50 mg Oral Daily    gabapentin  100 mg Oral Daily    And    gabapentin  400 mg Oral QHS    insulin aspart U-100  8-11 Units Subcutaneous TIDWM    insulin glargine U-100  10 Units Subcutaneous Daily    levETIRAcetam  1,500 mg Oral BID    mupirocin   Nasal BID    potassium chloride  40 mEq Oral BID    warfarin  1.5 mg Oral Daily     PRN Meds:  Current Facility-Administered Medications:     acetaminophen, 650 mg, Oral, Q6H PRN    dextrose 50%, 12.5 g, Intravenous, PRN    dextrose 50%, 25 g, Intravenous, PRN    glucagon (human recombinant), 1 mg, Intramuscular, PRN    glucose, 16 g, Oral, PRN    glucose, 24 g, Oral, PRN    influenza, 0.5 mL, Intramuscular, vaccine x 1 dose    insulin aspart U-100, 0-10 Units, Subcutaneous, QID (AC + HS) PRN    Review of patient's allergies indicates:   Allergen Reactions    Bumex [bumetanide] Hives     Objective:     Vital Signs (Most Recent):  Temp: 97.7 °F (36.5 °C) (01/04/25 1129)  Pulse: 88 (01/04/25 1129)  Resp: 18 (01/04/25 1129)  BP: (!) 90/0 (01/04/25 1116)  SpO2: 98 % (01/04/25 1129) Vital Signs (24h Range):  Temp:  [97.6 °F (36.4 °C)-98 °F (36.7 °C)] 97.7 °F (36.5 °C)  Pulse:  [] 88  Resp:  [18] 18  SpO2:  [95 %-98 %] 98 %  BP: (80-90)/(0) 90/0     Patient Vitals for the past 72 hrs (Last 3 readings):   Weight   01/03/25 0400 83.2 kg (183 lb 6.8 oz)   01/01/25 2337 82.5 kg (181 lb 14.1 oz)   01/01/25 1258 72.3 kg (159 lb 6.3 oz)     Body mass index is 22.93  kg/m².      Intake/Output Summary (Last 24 hours) at 1/4/2025 1147  Last data filed at 1/4/2025 1112  Gross per 24 hour   Intake 1498 ml   Output 4475 ml   Net -2977 ml       Hemodynamic Parameters:       Telemetry: reviewed        Physical Exam  Vitals and nursing note reviewed.   Constitutional:       Appearance: Normal appearance.   HENT:      Head: Normocephalic.      Mouth/Throat:      Mouth: Mucous membranes are moist.   Eyes:      Pupils: Pupils are equal, round, and reactive to light.   Neck:      Comments: JVP elevation to mid-upper neck   Cardiovascular:      Comments: Smooth VAD hum   Pulmonary:      Effort: Pulmonary effort is normal.   Abdominal:      General: Bowel sounds are normal. There is no distension.      Palpations: Abdomen is soft.   Musculoskeletal:         General: Normal range of motion.      Right lower leg: Edema present.      Left lower leg: Edema present.   Skin:     General: Skin is warm.      Capillary Refill: Capillary refill takes 2 to 3 seconds.   Neurological:      General: No focal deficit present.      Mental Status: He is alert and oriented to person, place, and time.   Psychiatric:         Mood and Affect: Mood normal.         Behavior: Behavior normal.            Significant Labs:  CBC:  Recent Labs   Lab 01/02/25  0526 01/03/25  0735 01/04/25  0406   WBC 9.20 10.09 8.62   RBC 4.50* 4.52* 4.46*   HGB 8.2* 8.3* 8.3*   HCT 28.9* 28.9* 28.0*    388 378   MCV 64* 64* 63*   MCH 18.2* 18.4* 18.6*   MCHC 28.4* 28.7* 29.6*     BNP:  Recent Labs   Lab 12/31/24  0339   *     CMP:  Recent Labs   Lab 12/31/24  0339 01/01/25  0455 01/01/25  1746 01/03/25  0735 01/03/25  1114 01/03/25  1745 01/04/25  0819   * 128*   < > 143* 199* 179* 129*   CALCIUM 8.2* 8.1*   < > 8.7 8.6* 8.6* 8.7   ALBUMIN 2.4* 2.4*  --  2.4*  --   --   --    PROT 8.6* 8.3  --  8.3  --   --   --    * 133*   < > 134* 132* 132* 133*   K 3.7 3.2*   < > 4.0 4.0 4.1 3.7   CO2 22* 22*   < > 24 22*  26 22*   CL 99 98   < > 99 97 99 98   BUN 16 14   < > 21* 20 21* 19   CREATININE 1.1 1.2   < > 1.2 1.2 1.2 1.1   ALKPHOS 153* 154*  --  153*  --   --   --    ALT 7* 7*  --  6*  --   --   --    AST 38 26  --  22  --   --   --    BILITOT 1.8* 1.9*  --  2.0*  --   --   --     < > = values in this interval not displayed.      Coagulation:   Recent Labs   Lab 01/02/25 0526 01/03/25  0735 01/04/25  0406   INR 2.1* 2.5* 2.5*   APTT 33.1* 30.3 31.2     LDH:  Recent Labs   Lab 01/02/25 0526 01/03/25  0735 01/04/25  0406   * 256 243     Microbiology:  Microbiology Results (last 7 days)       ** No results found for the last 168 hours. **            I have reviewed all pertinent labs within the past 24 hours.    Estimated Creatinine Clearance: 106.1 mL/min (based on SCr of 1.1 mg/dL).    Diagnostic Results:  I have reviewed all pertinent imaging results/findings within the past 24 hours.

## 2025-01-04 NOTE — SUBJECTIVE & OBJECTIVE
"Interval HPI:   No acute events overnight. Patient in room [id number] A. Blood glucose stable. BG at and above goal on current insulin regimen (SSI, prandial, and basal insulin ). Steroid use- None.    Renal function- Normal   Lab Results   Component Value Date    CREATININE 1.2 01/03/2025     Vasopressors-  None     Endocrine will continue to follow and manage insulin orders inpatient.     Diet diabetic Cardiac (Low Na/Chol), Low Sodium,2gm; 2000 Calories (up to 75 gm per meal); Fluid - 1500mL     Eating:   <25%  Nausea: No  Hypoglycemia and intervention: No  Fever: No  TPN and/or TF: No  If yes, type of TF/TPN and rate: N/A    BP (!) 80/0 (BP Location: Right arm, Patient Position: Lying)   Pulse 78   Temp 97.6 °F (36.4 °C) (Oral)   Resp 18   Ht 6' 3" (1.905 m)   Wt 83.2 kg (183 lb 6.8 oz)   SpO2 95%   BMI 22.93 kg/m²     Labs Reviewed and Include    Recent Labs   Lab 01/03/25  1745   *   CALCIUM 8.6*   *   K 4.1   CO2 26   CL 99   BUN 21*   CREATININE 1.2     Lab Results   Component Value Date    WBC 8.62 01/04/2025    HGB 8.3 (L) 01/04/2025    HCT 28.0 (L) 01/04/2025    MCV 63 (L) 01/04/2025     01/04/2025     No results for input(s): "TSH", "FREET4" in the last 168 hours.  Lab Results   Component Value Date    HGBA1C 10.6 (H) 12/31/2024       Nutritional status:   Body mass index is 22.93 kg/m².  Lab Results   Component Value Date    ALBUMIN 2.4 (L) 01/03/2025    ALBUMIN 2.4 (L) 01/01/2025    ALBUMIN 2.4 (L) 12/31/2024     Lab Results   Component Value Date    PREALBUMIN 8 (L) 01/03/2025    PREALBUMIN 9 (L) 01/01/2025    PREALBUMIN 12 (L) 11/21/2024       Estimated Creatinine Clearance: 97.3 mL/min (based on SCr of 1.2 mg/dL).    Accu-Checks  Recent Labs     01/01/25  1737 01/01/25 2015 01/02/25  0909 01/02/25  1243 01/02/25  1618 01/02/25  2109 01/03/25  0838 01/03/25  1209 01/03/25  1627 01/03/25 2109   POCTGLUCOSE 173* 173* 166* 172* 198* 180* 142* 189* 219* 132* "       Current Medications and/or Treatments Impacting Glycemic Control  Immunotherapy:    Immunosuppressants       None          Steroids:   Hormones (From admission, onward)      None          Pressors:    Autonomic Drugs (From admission, onward)      None          Hyperglycemia/Diabetes Medications:   Antihyperglycemics (From admission, onward)      Start     Stop Route Frequency Ordered    01/03/25 1130  insulin aspart U-100 pen 8-11 Units         -- SubQ 3 times daily with meals 01/03/25 0841    01/01/25 0900  insulin glargine U-100 (Lantus) pen 10 Units         -- SubQ Daily 01/01/25 0813    12/31/24 1525  insulin aspart U-100 pen 0-10 Units         -- SubQ Before meals & nightly PRN 12/31/24 1428

## 2025-01-04 NOTE — PROGRESS NOTES
Doppler elevated throughout shift, pt asymptomatic. MD Ayala notified, new orders for 10 mg amlodipine daily.    01/04/25 1116 01/04/25 1236 01/04/25 1700   Vital Signs   BP (!) 90/0 (!) 90/0 (!) 92/0   BP Location Left arm Left arm Left arm   BP Method Doppler Doppler Doppler   Patient Position Lying Lying Lying      01/04/25 1706   Vital Signs   BP (!) 96/0   BP Location Left arm   BP Method Doppler   Patient Position Lying

## 2025-01-04 NOTE — PROCEDURES
Interrogation of Ventricular assist device was performed with physician analysis of device parameters and review of device function. I have personally reviewed the interrogation findings and agree with findings as stated.   Procedure: Device Interrogation Including analysis of device parameters  Current Settings: Ventricular Assist Device  Review of device function is stable      1/4/2025    11:09 AM 1/4/2025     8:22 AM 1/4/2025     4:11 AM 1/3/2025    11:33 PM 1/3/2025     8:44 PM 1/3/2025     6:34 PM 1/3/2025     5:55 PM   TXP LVAD INTERROGATIONS   Type HeartMate3 HeartMate3 HeartMate3 HeartMate3 HeartMate3 HeartMate3 HeartMate3   Flow 4.1 4.4 4.2 4.2 4.1  4.1   Speed 5100 5150 5100 5100 5100  5100   PI 5.1 4.6 4.4 4.8 5.4  4   Power (Serna) 3.6 3.6 3.6 3.6 3.6  3.5   LSL 4700 4700 4700 4700 4700     Pulsatility No Pulse No Pulse Intermittent pulse Intermittent pulse Intermittent pulse        Natalya Villatoro MD

## 2025-01-04 NOTE — NURSING
LVAD dressing change completed using sterile technique with daily kit. DLES is a 2 with minimal drainage noted on the drain sponge. Tolerated without any complication. No redness, or tenderness noted.

## 2025-01-04 NOTE — PROGRESS NOTES
"[facility]  Endocrinology  Progress Note    Admit Date: 12/31/2024     Reason for Consult: Management of T2DM, Hyperglycemia     Surgical Procedure and Date: LVAD 06/29/2022      Diabetes diagnosis year: 2022    Lab Results   Component Value Date    HGBA1C 10.3 (H) 08/25/2024       Home Diabetes Medications:  Levemir 20 units twice daily and Novolog 10 or 18 units with meals per patient    How often checking glucose at home? 3-4x day   BG readings on regimen: 150-200s  Hypoglycemia on the regimen?  Yes, at least once weekly; experiences blurred vision  Missed doses on regimen?  Yes, he has been out of Levemir "for a while" since it has been discontinued    Diabetes Complications include:   Hyperglycemia     Complicating diabetes co morbidities:   History of MI, CHF, and CKD      HPI:   Patient is a 39 y.o. 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 He underwent RVAD removal and chest closure 6/30/2022. He presented with one month hx of dyspnea and abdominal distention. He reported bilateral LE edema which prompted him to come to the ED with the assistance of his family as they drove from [place]. He reported intermittent chest pain with coughing. Endo consulted for BG management.            Interval HPI:   No acute events overnight. Patient in room [id number] A. Blood glucose stable. BG at and above goal on current insulin regimen (SSI, prandial, and basal insulin ). Steroid use- None.    Renal function- Normal   Lab Results   Component Value Date    CREATININE 1.2 01/03/2025     Vasopressors-  None     Endocrine will continue to follow and manage insulin orders inpatient.     Diet diabetic Cardiac (Low Na/Chol), Low " "Sodium,2gm; 2000 Calories (up to 75 gm per meal); Fluid - 1500mL     Eating:   <25%  Nausea: No  Hypoglycemia and intervention: No  Fever: No  TPN and/or TF: No  If yes, type of TF/TPN and rate: N/A    BP (!) 80/0 (BP Location: Right arm, Patient Position: Lying)   Pulse 78   Temp 97.6 °F (36.4 °C) (Oral)   Resp 18   Ht 6' 3" (1.905 m)   Wt 83.2 kg (183 lb 6.8 oz)   SpO2 95%   BMI 22.93 kg/m²     Labs Reviewed and Include    Recent Labs   Lab 01/03/25  1745   *   CALCIUM 8.6*   *   K 4.1   CO2 26   CL 99   BUN 21*   CREATININE 1.2     Lab Results   Component Value Date    WBC 8.62 01/04/2025    HGB 8.3 (L) 01/04/2025    HCT 28.0 (L) 01/04/2025    MCV 63 (L) 01/04/2025     01/04/2025     No results for input(s): "TSH", "FREET4" in the last 168 hours.  Lab Results   Component Value Date    HGBA1C 10.6 (H) 12/31/2024       Nutritional status:   Body mass index is 22.93 kg/m².  Lab Results   Component Value Date    ALBUMIN 2.4 (L) 01/03/2025    ALBUMIN 2.4 (L) 01/01/2025    ALBUMIN 2.4 (L) 12/31/2024     Lab Results   Component Value Date    PREALBUMIN 8 (L) 01/03/2025    PREALBUMIN 9 (L) 01/01/2025    PREALBUMIN 12 (L) 11/21/2024       Estimated Creatinine Clearance: 97.3 mL/min (based on SCr of 1.2 mg/dL).    Accu-Checks  Recent Labs     01/01/25  1737 01/01/25  2015 01/02/25  0909 01/02/25  1243 01/02/25  1618 01/02/25  2109 01/03/25  0838 01/03/25  1209 01/03/25  1627 01/03/25 2109   POCTGLUCOSE 173* 173* 166* 172* 198* 180* 142* 189* 219* 132*       Current Medications and/or Treatments Impacting Glycemic Control  Immunotherapy:    Immunosuppressants       None          Steroids:   Hormones (From admission, onward)      None          Pressors:    Autonomic Drugs (From admission, onward)      None          Hyperglycemia/Diabetes Medications:   Antihyperglycemics (From admission, onward)      Start     Stop Route Frequency Ordered    01/03/25 1130  insulin aspart U-100 pen 8-11 Units      "    -- SubQ 3 times daily with meals 01/03/25 0841    01/01/25 0900  insulin glargine U-100 (Lantus) pen 10 Units         -- SubQ Daily 01/01/25 0813    12/31/24 1525  insulin aspart U-100 pen 0-10 Units         -- SubQ Before meals & nightly PRN 12/31/24 1426            ASSESSMENT and PLAN    Cardiac/Vascular  * Acute decompensated heart failure  Managed by primary team  Optimize blood glucose control        LVAD (left ventricular assist device) present  Managed by primary team      Endocrine  Type 2 diabetes mellitus with hyperglycemia, with long-term current use of insulin  Endocrinology consulted for BG management.   BG goal 140-180    - Lantus (Insulin Glargine) 10 units daily   - Novolog (Insulin Aspart) 8-11 units TIDWM. Administer 8 units if patient eats 25% -  50% and 11 units if patient eats 50% or more. HOLD if patient is NPO, unable to eat, or if Blood Glucose is less than 100 mg/dL.   - [facility] [place] SSI (150/25)  - BG checks AC/HS  - Hypoglycemia protocol in place    ** Please notify Endocrine for any change and/or advance in diet**  ** Please call Endocrine for any BG related issues **    Discharge Planning:   TBD. Please notify endocrinology prior to discharge.            Guera De Oliveira PA-C  Endocrinology  [facility]

## 2025-01-04 NOTE — ASSESSMENT & PLAN NOTE
Endocrinology consulted for BG management.   BG goal 140-180    - Lantus (Insulin Glargine) 10 units daily   - Novolog (Insulin Aspart) 8-11 units TIDWM. Administer 8 units if patient eats 25% -  50% and 11 units if patient eats 50% or more. HOLD if patient is NPO, unable to eat, or if Blood Glucose is less than 100 mg/dL.   - [facility] [place] SSI (150/25)  - BG checks AC/HS  - Hypoglycemia protocol in place    ** Please notify Endocrine for any change and/or advance in diet**  ** Please call Endocrine for any BG related issues **    Discharge Planning:   TBD. Please notify endocrinology prior to discharge.

## 2025-01-05 LAB
ALBUMIN SERPL BCP-MCNC: 2.6 G/DL (ref 3.5–5.2)
ALP SERPL-CCNC: 153 U/L (ref 40–150)
ALT SERPL W/O P-5'-P-CCNC: 6 U/L (ref 10–44)
ANION GAP SERPL CALC-SCNC: 11 MMOL/L (ref 8–16)
ANION GAP SERPL CALC-SCNC: 9 MMOL/L (ref 8–16)
APTT PPP: 32.4 SEC (ref 21–32)
AST SERPL-CCNC: 23 U/L (ref 10–40)
BASOPHILS # BLD AUTO: 0.01 K/UL (ref 0–0.2)
BASOPHILS NFR BLD: 0.1 % (ref 0–1.9)
BILIRUB DIRECT SERPL-MCNC: 1.1 MG/DL (ref 0.1–0.3)
BILIRUB SERPL-MCNC: 1.7 MG/DL (ref 0.1–1)
BUN SERPL-MCNC: 17 MG/DL (ref 6–20)
BUN SERPL-MCNC: 18 MG/DL (ref 6–20)
CALCIUM SERPL-MCNC: 8.4 MG/DL (ref 8.7–10.5)
CALCIUM SERPL-MCNC: 8.5 MG/DL (ref 8.7–10.5)
CHLORIDE SERPL-SCNC: 98 MMOL/L (ref 95–110)
CHLORIDE SERPL-SCNC: 99 MMOL/L (ref 95–110)
CO2 SERPL-SCNC: 24 MMOL/L (ref 23–29)
CO2 SERPL-SCNC: 27 MMOL/L (ref 23–29)
CREAT SERPL-MCNC: 1.2 MG/DL (ref 0.5–1.4)
CREAT SERPL-MCNC: 1.2 MG/DL (ref 0.5–1.4)
DIFFERENTIAL METHOD BLD: ABNORMAL
EOSINOPHIL # BLD AUTO: 0 K/UL (ref 0–0.5)
EOSINOPHIL NFR BLD: 0.2 % (ref 0–8)
ERYTHROCYTE [DISTWIDTH] IN BLOOD BY AUTOMATED COUNT: 24.1 % (ref 11.5–14.5)
EST. GFR  (NO RACE VARIABLE): >60 ML/MIN/1.73 M^2
EST. GFR  (NO RACE VARIABLE): >60 ML/MIN/1.73 M^2
GLUCOSE SERPL-MCNC: 119 MG/DL (ref 70–110)
GLUCOSE SERPL-MCNC: 139 MG/DL (ref 70–110)
HCT VFR BLD AUTO: 27.3 % (ref 40–54)
HGB BLD-MCNC: 8 G/DL (ref 14–18)
IMM GRANULOCYTES # BLD AUTO: 0.05 K/UL (ref 0–0.04)
IMM GRANULOCYTES NFR BLD AUTO: 0.5 % (ref 0–0.5)
INR PPP: 3 (ref 0.8–1.2)
LDH SERPL L TO P-CCNC: 253 U/L (ref 110–260)
LYMPHOCYTES # BLD AUTO: 0.9 K/UL (ref 1–4.8)
LYMPHOCYTES NFR BLD: 8.3 % (ref 18–48)
MAGNESIUM SERPL-MCNC: 2.2 MG/DL (ref 1.6–2.6)
MCH RBC QN AUTO: 18.5 PG (ref 27–31)
MCHC RBC AUTO-ENTMCNC: 29.3 G/DL (ref 32–36)
MCV RBC AUTO: 63 FL (ref 82–98)
MONOCYTES # BLD AUTO: 1.1 K/UL (ref 0.3–1)
MONOCYTES NFR BLD: 9.9 % (ref 4–15)
NEUTROPHILS # BLD AUTO: 8.8 K/UL (ref 1.8–7.7)
NEUTROPHILS NFR BLD: 81 % (ref 38–73)
NRBC BLD-RTO: 0 /100 WBC
PHOSPHATE SERPL-MCNC: 3.3 MG/DL (ref 2.7–4.5)
PLATELET # BLD AUTO: 391 K/UL (ref 150–450)
PMV BLD AUTO: 9.6 FL (ref 9.2–12.9)
POCT GLUCOSE: 108 MG/DL (ref 70–110)
POCT GLUCOSE: 143 MG/DL (ref 70–110)
POCT GLUCOSE: 168 MG/DL (ref 70–110)
POCT GLUCOSE: 238 MG/DL (ref 70–110)
POTASSIUM SERPL-SCNC: 3.5 MMOL/L (ref 3.5–5.1)
POTASSIUM SERPL-SCNC: 3.7 MMOL/L (ref 3.5–5.1)
PROT SERPL-MCNC: 8.8 G/DL (ref 6–8.4)
PROTHROMBIN TIME: 30 SEC (ref 9–12.5)
RBC # BLD AUTO: 4.33 M/UL (ref 4.6–6.2)
SODIUM SERPL-SCNC: 134 MMOL/L (ref 136–145)
SODIUM SERPL-SCNC: 134 MMOL/L (ref 136–145)
WBC # BLD AUTO: 10.89 K/UL (ref 3.9–12.7)

## 2025-01-05 PROCEDURE — 83615 LACTATE (LD) (LDH) ENZYME: CPT | Performed by: STUDENT IN AN ORGANIZED HEALTH CARE EDUCATION/TRAINING PROGRAM

## 2025-01-05 PROCEDURE — 99232 SBSQ HOSP IP/OBS MODERATE 35: CPT | Mod: ,,,

## 2025-01-05 PROCEDURE — 80048 BASIC METABOLIC PNL TOTAL CA: CPT

## 2025-01-05 PROCEDURE — 93750 INTERROGATION VAD IN PERSON: CPT | Mod: ,,, | Performed by: INTERNAL MEDICINE

## 2025-01-05 PROCEDURE — 21400001 HC TELEMETRY ROOM

## 2025-01-05 PROCEDURE — 20600001 HC STEP DOWN PRIVATE ROOM

## 2025-01-05 PROCEDURE — 25000003 PHARM REV CODE 250: Performed by: STUDENT IN AN ORGANIZED HEALTH CARE EDUCATION/TRAINING PROGRAM

## 2025-01-05 PROCEDURE — 99233 SBSQ HOSP IP/OBS HIGH 50: CPT | Mod: ,,, | Performed by: INTERNAL MEDICINE

## 2025-01-05 PROCEDURE — 25000003 PHARM REV CODE 250

## 2025-01-05 PROCEDURE — 85730 THROMBOPLASTIN TIME PARTIAL: CPT | Performed by: STUDENT IN AN ORGANIZED HEALTH CARE EDUCATION/TRAINING PROGRAM

## 2025-01-05 PROCEDURE — 63600175 PHARM REV CODE 636 W HCPCS

## 2025-01-05 PROCEDURE — 36415 COLL VENOUS BLD VENIPUNCTURE: CPT

## 2025-01-05 PROCEDURE — 84100 ASSAY OF PHOSPHORUS: CPT | Performed by: STUDENT IN AN ORGANIZED HEALTH CARE EDUCATION/TRAINING PROGRAM

## 2025-01-05 PROCEDURE — 85025 COMPLETE CBC W/AUTO DIFF WBC: CPT | Performed by: STUDENT IN AN ORGANIZED HEALTH CARE EDUCATION/TRAINING PROGRAM

## 2025-01-05 PROCEDURE — 80076 HEPATIC FUNCTION PANEL: CPT

## 2025-01-05 PROCEDURE — 27000248 HC VAD-ADDITIONAL DAY

## 2025-01-05 PROCEDURE — 25000003 PHARM REV CODE 250: Performed by: INTERNAL MEDICINE

## 2025-01-05 PROCEDURE — 36415 COLL VENOUS BLD VENIPUNCTURE: CPT | Performed by: STUDENT IN AN ORGANIZED HEALTH CARE EDUCATION/TRAINING PROGRAM

## 2025-01-05 PROCEDURE — 83735 ASSAY OF MAGNESIUM: CPT | Performed by: STUDENT IN AN ORGANIZED HEALTH CARE EDUCATION/TRAINING PROGRAM

## 2025-01-05 PROCEDURE — 85610 PROTHROMBIN TIME: CPT | Performed by: STUDENT IN AN ORGANIZED HEALTH CARE EDUCATION/TRAINING PROGRAM

## 2025-01-05 RX ORDER — POLYETHYLENE GLYCOL 3350 17 G/17G
17 POWDER, FOR SOLUTION ORAL DAILY
Status: DISCONTINUED | OUTPATIENT
Start: 2025-01-06 | End: 2025-01-09 | Stop reason: HOSPADM

## 2025-01-05 RX ORDER — LACTULOSE 10 G/15ML
10 SOLUTION ORAL 3 TIMES DAILY
Status: DISCONTINUED | OUTPATIENT
Start: 2025-01-05 | End: 2025-01-09 | Stop reason: HOSPADM

## 2025-01-05 RX ORDER — AMOXICILLIN 250 MG
1 CAPSULE ORAL DAILY PRN
Status: DISCONTINUED | OUTPATIENT
Start: 2025-01-05 | End: 2025-01-05

## 2025-01-05 RX ORDER — AMOXICILLIN 250 MG
1 CAPSULE ORAL DAILY
Status: DISCONTINUED | OUTPATIENT
Start: 2025-01-05 | End: 2025-01-09 | Stop reason: HOSPADM

## 2025-01-05 RX ORDER — WARFARIN 1 MG/1
1 TABLET ORAL DAILY
Status: DISCONTINUED | OUTPATIENT
Start: 2025-01-05 | End: 2025-01-06

## 2025-01-05 RX ADMIN — GABAPENTIN 400 MG: 400 CAPSULE ORAL at 09:01

## 2025-01-05 RX ADMIN — AMIODARONE HYDROCHLORIDE 200 MG: 200 TABLET ORAL at 08:01

## 2025-01-05 RX ADMIN — LACTULOSE 10 G: 20 SOLUTION ORAL at 05:01

## 2025-01-05 RX ADMIN — LACTULOSE 10 G: 20 SOLUTION ORAL at 09:01

## 2025-01-05 RX ADMIN — LEVETIRACETAM 1500 MG: 500 TABLET, FILM COATED ORAL at 08:01

## 2025-01-05 RX ADMIN — FUROSEMIDE 40 MG/HR: 10 INJECTION, SOLUTION INTRAMUSCULAR; INTRAVENOUS at 02:01

## 2025-01-05 RX ADMIN — FUROSEMIDE 30 MG/HR: 10 INJECTION, SOLUTION INTRAMUSCULAR; INTRAVENOUS at 05:01

## 2025-01-05 RX ADMIN — ACETAMINOPHEN 650 MG: 325 TABLET ORAL at 02:01

## 2025-01-05 RX ADMIN — CEFADROXIL 500 MG: 500 CAPSULE ORAL at 09:01

## 2025-01-05 RX ADMIN — ASPIRIN 81 MG: 81 TABLET, COATED ORAL at 08:01

## 2025-01-05 RX ADMIN — AMLODIPINE BESYLATE 10 MG: 10 TABLET ORAL at 08:01

## 2025-01-05 RX ADMIN — POTASSIUM CHLORIDE 40 MEQ: 1500 TABLET, EXTENDED RELEASE ORAL at 09:01

## 2025-01-05 RX ADMIN — EPLERENONE 50 MG: 25 TABLET, FILM COATED ORAL at 08:01

## 2025-01-05 RX ADMIN — INSULIN ASPART 8 UNITS: 100 INJECTION, SOLUTION INTRAVENOUS; SUBCUTANEOUS at 02:01

## 2025-01-05 RX ADMIN — LEVETIRACETAM 1500 MG: 500 TABLET, FILM COATED ORAL at 09:01

## 2025-01-05 RX ADMIN — WARFARIN SODIUM 1 MG: 1 TABLET ORAL at 05:01

## 2025-01-05 RX ADMIN — ATORVASTATIN CALCIUM 40 MG: 40 TABLET, FILM COATED ORAL at 08:01

## 2025-01-05 RX ADMIN — INSULIN GLARGINE 10 UNITS: 100 INJECTION, SOLUTION SUBCUTANEOUS at 08:01

## 2025-01-05 RX ADMIN — GABAPENTIN 100 MG: 100 CAPSULE ORAL at 08:01

## 2025-01-05 RX ADMIN — SENNOSIDES AND DOCUSATE SODIUM 1 TABLET: 50; 8.6 TABLET ORAL at 05:01

## 2025-01-05 RX ADMIN — INSULIN ASPART 8 UNITS: 100 INJECTION, SOLUTION INTRAVENOUS; SUBCUTANEOUS at 05:01

## 2025-01-05 RX ADMIN — DULOXETINE HYDROCHLORIDE 30 MG: 30 CAPSULE, DELAYED RELEASE ORAL at 08:01

## 2025-01-05 RX ADMIN — POTASSIUM CHLORIDE 40 MEQ: 1500 TABLET, EXTENDED RELEASE ORAL at 08:01

## 2025-01-05 NOTE — NURSING
LVAD dressing change completed using sterile technique with daily kit. DLES is a 2 with small tan drainage noted on the drain sponge. Tolerated without any complication. No redness, or tenderness noted.

## 2025-01-05 NOTE — SUBJECTIVE & OBJECTIVE
Interval History: No overnight events. Diuresed effectively overnight. Will decrease gtt to rate of 30mg/hr. Cont to monitor BID lytes.    Continuous Infusions:   furosemide (Lasix) 500 mg in 50 mL infusion (conc: 10 mg/mL)  30 mg/hr Intravenous Continuous 3 mL/hr at 01/05/25 0844 30 mg/hr at 01/05/25 0844     Scheduled Meds:   amiodarone  200 mg Oral Daily    amLODIPine  10 mg Oral Daily    aspirin  81 mg Oral Daily    atorvastatin  40 mg Oral Daily    cefadroxil  500 mg Oral Q12H    DULoxetine  30 mg Oral Daily    eplerenone  50 mg Oral Daily    gabapentin  100 mg Oral Daily    And    gabapentin  400 mg Oral QHS    insulin aspart U-100  8-11 Units Subcutaneous TIDWM    insulin glargine U-100  10 Units Subcutaneous Daily    levETIRAcetam  1,500 mg Oral BID    mupirocin   Nasal BID    potassium chloride  40 mEq Oral BID    warfarin  1.5 mg Oral Daily     PRN Meds:  Current Facility-Administered Medications:     acetaminophen, 650 mg, Oral, Q6H PRN    dextrose 50%, 12.5 g, Intravenous, PRN    dextrose 50%, 25 g, Intravenous, PRN    glucagon (human recombinant), 1 mg, Intramuscular, PRN    glucose, 16 g, Oral, PRN    glucose, 24 g, Oral, PRN    influenza, 0.5 mL, Intramuscular, vaccine x 1 dose    insulin aspart U-100, 0-10 Units, Subcutaneous, QID (AC + HS) PRN    Review of patient's allergies indicates:   Allergen Reactions    Bumex [bumetanide] Hives     Objective:     Vital Signs (Most Recent):  Temp: 97.6 °F (36.4 °C) (01/05/25 0732)  Pulse: 91 (01/05/25 0732)  Resp: 18 (01/05/25 0732)  BP: (!) 78/0 (01/05/25 0800)  SpO2: 99 % (01/05/25 0732) Vital Signs (24h Range):  Temp:  [97.6 °F (36.4 °C)-98.2 °F (36.8 °C)] 97.6 °F (36.4 °C)  Pulse:  [83-95] 91  Resp:  [18-19] 18  SpO2:  [96 %-99 %] 99 %  BP: (78-98)/(0) 78/0     Patient Vitals for the past 72 hrs (Last 3 readings):   Weight   01/05/25 0437 75.9 kg (167 lb 5.3 oz)   01/03/25 0400 83.2 kg (183 lb 6.8 oz)     Body mass index is 20.91 kg/m².      Intake/Output  Summary (Last 24 hours) at 1/5/2025 1050  Last data filed at 1/5/2025 0948  Gross per 24 hour   Intake 1789.07 ml   Output 3275 ml   Net -1485.93 ml       Hemodynamic Parameters:       Telemetry: reviewed        Physical Exam  Vitals and nursing note reviewed.   Constitutional:       Appearance: Normal appearance.   HENT:      Head: Normocephalic.      Mouth/Throat:      Mouth: Mucous membranes are moist.   Eyes:      Pupils: Pupils are equal, round, and reactive to light.   Neck:      Comments: JVP elevation to mid-upper neck   Cardiovascular:      Comments: Smooth VAD hum   Pulmonary:      Effort: Pulmonary effort is normal.   Abdominal:      General: Bowel sounds are normal. There is no distension.      Palpations: Abdomen is soft.   Musculoskeletal:         General: Normal range of motion.      Right lower leg: No edema.      Left lower leg: No edema.   Skin:     General: Skin is warm.      Capillary Refill: Capillary refill takes 2 to 3 seconds.   Neurological:      General: No focal deficit present.      Mental Status: He is alert and oriented to person, place, and time.   Psychiatric:         Mood and Affect: Mood normal.         Behavior: Behavior normal.            Significant Labs:  CBC:  Recent Labs   Lab 01/03/25  0735 01/04/25  0406 01/05/25  0246   WBC 10.09 8.62 10.89   RBC 4.52* 4.46* 4.33*   HGB 8.3* 8.3* 8.0*   HCT 28.9* 28.0* 27.3*    378 391   MCV 64* 63* 63*   MCH 18.4* 18.6* 18.5*   MCHC 28.7* 29.6* 29.3*     BNP:  Recent Labs   Lab 12/31/24  0339   *     CMP:  Recent Labs   Lab 12/31/24  0339 01/01/25  0455 01/01/25  1746 01/03/25  0735 01/03/25  1114 01/04/25  0819 01/04/25  1809 01/05/25  0833   * 128*   < > 143*   < > 129* 162* 139*   CALCIUM 8.2* 8.1*   < > 8.7   < > 8.7 8.5* 8.4*   ALBUMIN 2.4* 2.4*  --  2.4*  --   --   --   --    PROT 8.6* 8.3  --  8.3  --   --   --   --    * 133*   < > 134*   < > 133* 132* 134*   K 3.7 3.2*   < > 4.0   < > 3.7 4.2 3.5   CO2  22* 22*   < > 24   < > 22* 23 24   CL 99 98   < > 99   < > 98 99 99   BUN 16 14   < > 21*   < > 19 21* 18   CREATININE 1.1 1.2   < > 1.2   < > 1.1 1.2 1.2   ALKPHOS 153* 154*  --  153*  --   --   --   --    ALT 7* 7*  --  6*  --   --   --   --    AST 38 26  --  22  --   --   --   --    BILITOT 1.8* 1.9*  --  2.0*  --   --   --   --     < > = values in this interval not displayed.      Coagulation:   Recent Labs   Lab 01/03/25  0735 01/04/25  0406 01/05/25  0246   INR 2.5* 2.5* 3.0*   APTT 30.3 31.2 32.4*     LDH:  Recent Labs   Lab 01/03/25  0735 01/04/25  0406 01/05/25  0246    243 253     Microbiology:  Microbiology Results (last 7 days)       ** No results found for the last 168 hours. **            I have reviewed all pertinent labs within the past 24 hours.    Estimated Creatinine Clearance: 88.7 mL/min (based on SCr of 1.2 mg/dL).    Diagnostic Results:  I have reviewed all pertinent imaging results/findings within the past 24 hours.

## 2025-01-05 NOTE — PROGRESS NOTES
"[facility]  Endocrinology  Progress Note    Admit Date: 12/31/2024     Reason for Consult: Management of T2DM, Hyperglycemia     Surgical Procedure and Date: LVAD 06/29/2022      Diabetes diagnosis year: 2022    Lab Results   Component Value Date    HGBA1C 10.3 (H) 08/25/2024       Home Diabetes Medications:  Levemir 20 units twice daily and Novolog 10 or 18 units with meals per patient    How often checking glucose at home? 3-4x day   BG readings on regimen: 150-200s  Hypoglycemia on the regimen?  Yes, at least once weekly; experiences blurred vision  Missed doses on regimen?  Yes, he has been out of Levemir "for a while" since it has been discontinued    Diabetes Complications include:   Hyperglycemia     Complicating diabetes co morbidities:   History of MI, CHF, and CKD      HPI:   Patient is a 39 y.o. 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 He underwent RVAD removal and chest closure 6/30/2022. He presented with one month hx of dyspnea and abdominal distention. He reported bilateral LE edema which prompted him to come to the ED with the assistance of his family as they drove from [place]. He reported intermittent chest pain with coughing. Endo consulted for BG management.            Interval HPI:   No acute events overnight. Patient in room [id number] A. Blood glucose stable. BG at and above goal on current insulin regimen (SSI, prandial, and basal insulin ). Steroid use- None.    Renal function- Normal   Lab Results   Component Value Date    CREATININE 1.2 01/05/2025     Vasopressors-  None     Endocrine will continue to follow and manage insulin orders inpatient.     Diet diabetic Cardiac (Low Na/Chol), Low " "Sodium,2gm; 2000 Calories (up to 75 gm per meal); Fluid - 1500mL     Eating:   <25%  Nausea: No  Hypoglycemia and intervention: No  Fever: No  TPN and/or TF: No  If yes, type of TF/TPN and rate: N/A    BP (!) 78/0 (BP Location: Left arm, Patient Position: Lying)   Pulse 91   Temp 97.6 °F (36.4 °C) (Oral)   Resp 18   Ht 6' 3" (1.905 m)   Wt 75.9 kg (167 lb 5.3 oz)   SpO2 99%   BMI 20.91 kg/m²     Labs Reviewed and Include    Recent Labs   Lab 01/05/25  0833   *   CALCIUM 8.4*   *   K 3.5   CO2 24   CL 99   BUN 18   CREATININE 1.2     Lab Results   Component Value Date    WBC 10.89 01/05/2025    HGB 8.0 (L) 01/05/2025    HCT 27.3 (L) 01/05/2025    MCV 63 (L) 01/05/2025     01/05/2025     No results for input(s): "TSH", "FREET4" in the last 168 hours.  Lab Results   Component Value Date    HGBA1C 10.6 (H) 12/31/2024       Nutritional status:   Body mass index is 20.91 kg/m².  Lab Results   Component Value Date    ALBUMIN 2.4 (L) 01/03/2025    ALBUMIN 2.4 (L) 01/01/2025    ALBUMIN 2.4 (L) 12/31/2024     Lab Results   Component Value Date    PREALBUMIN 8 (L) 01/03/2025    PREALBUMIN 9 (L) 01/01/2025    PREALBUMIN 12 (L) 11/21/2024       Estimated Creatinine Clearance: 88.7 mL/min (based on SCr of 1.2 mg/dL).    Accu-Checks  Recent Labs     01/02/25  1618 01/02/25  2109 01/03/25  0838 01/03/25  1209 01/03/25  1627 01/03/25  2109 01/04/25  0842 01/04/25  1254 01/04/25  1740 01/04/25  1950   POCTGLUCOSE 198* 180* 142* 189* 219* 132* 144* 219* 141* 157*       Current Medications and/or Treatments Impacting Glycemic Control  Immunotherapy:    Immunosuppressants       None          Steroids:   Hormones (From admission, onward)      None          Pressors:    Autonomic Drugs (From admission, onward)      None          Hyperglycemia/Diabetes Medications:   Antihyperglycemics (From admission, onward)      Start     Stop Route Frequency Ordered    01/03/25 1130  insulin aspart U-100 pen 8-11 Units      "    -- SubQ 3 times daily with meals 01/03/25 0841    01/01/25 0900  insulin glargine U-100 (Lantus) pen 10 Units         -- SubQ Daily 01/01/25 0813    12/31/24 1525  insulin aspart U-100 pen 0-10 Units         -- SubQ Before meals & nightly PRN 12/31/24 1426            ASSESSMENT and PLAN    Cardiac/Vascular  * Acute decompensated heart failure  Managed by primary team  Optimize blood glucose control        HTN (hypertension)  Uncontrolled HTN can worsen insulin resistance.         LVAD (left ventricular assist device) present  Managed by primary team      Endocrine  Type 2 diabetes mellitus with hyperglycemia, with long-term current use of insulin  Endocrinology consulted for BG management.   BG goal 140-180    - Lantus (Insulin Glargine) 10 units daily   - Novolog (Insulin Aspart) 8-11 units TIDWM. Administer 8 units if patient eats 25% -  50% and 11 units if patient eats 50% or more. HOLD if patient is NPO, unable to eat, or if Blood Glucose is less than 100 mg/dL.   - [facility] [place] SSI (150/25)  - BG checks AC/HS  - Hypoglycemia protocol in place    ** Please notify Endocrine for any change and/or advance in diet**  ** Please call Endocrine for any BG related issues **    Discharge Planning:   TBD. Please notify endocrinology prior to discharge.            Guera De Oliveira PA-C  Endocrinology  [facility]

## 2025-01-05 NOTE — PLAN OF CARE
Plan of care reviewed with patient. Patient verbalized understanding of care. Patient remained free of falls and or injuries. Patient complained of left shoulder pain, PRN meds given .Patient is currently resting, with no complaints at this moment. Urinal and call light within patient's reach.   Problem: Ventricular Assist Device  Goal: Optimal Adjustment to Device  Outcome: Progressing  Goal: Absence of Bleeding  Outcome: Progressing  Goal: Absence of Embolism Signs and Symptoms  Outcome: Progressing  Goal: Optimal Blood Flow  Outcome: Progressing  Goal: Absence of Infection Signs and Symptoms  Outcome: Progressing  Goal: Effective Right-Sided Heart Function  Outcome: Progressing     Problem: Adult Inpatient Plan of Care  Goal: Plan of Care Review  Outcome: Progressing  Goal: Patient-Specific Goal (Individualized)  Outcome: Progressing  Goal: Absence of Hospital-Acquired Illness or Injury  Outcome: Progressing  Goal: Optimal Comfort and Wellbeing  Outcome: Progressing  Goal: Readiness for Transition of Care  Outcome: Progressing     Problem: Diabetes Comorbidity  Goal: Blood Glucose Level Within Targeted Range  Outcome: Progressing     Problem: Sepsis/Septic Shock  Goal: Optimal Coping  Outcome: Progressing  Goal: Absence of Bleeding  Outcome: Progressing  Goal: Blood Glucose Level Within Targeted Range  Outcome: Progressing  Goal: Absence of Infection Signs and Symptoms  Outcome: Progressing  Goal: Optimal Nutrition Intake  Outcome: Progressing     Problem: Fall Injury Risk  Goal: Absence of Fall and Fall-Related Injury  Outcome: Progressing     Problem: Heart Failure  Goal: Optimal Coping  Outcome: Progressing  Goal: Optimal Cardiac Output  Outcome: Progressing  Goal: Stable Heart Rate and Rhythm  Outcome: Progressing  Goal: Optimal Functional Ability  Outcome: Progressing  Goal: Fluid and Electrolyte Balance  Outcome: Progressing  Goal: Improved Oral Intake  Outcome: Progressing  Goal: Effective Oxygenation and  Ventilation  Outcome: Progressing  Goal: Effective Breathing Pattern During Sleep  Outcome: Progressing

## 2025-01-05 NOTE — PLAN OF CARE
Plan of care discussed with patient.  Patient ambulating independently, fall precautions in place. LVAD DP and numbers WNL, smooth LVAD hum. Patient has no complaints of pain. Lasix gtt infusing per orders. Pt over fluid limit. Per night shift nurse, pt went on walk and got a bottle of soda and water from vending machine. Patient educated on fluid restriction. Pt verbalized understanding. Discussed medications and care. Patient has no questions at this time.         Problem: Ventricular Assist Device  Goal: Absence of Embolism Signs and Symptoms  Outcome: Progressing  Intervention: Prevent or Manage Embolism  Flowsheets (Taken 1/5/2025 1631)  VTE Prevention/Management:   ambulation promoted   bleeding precautions maintained   bleeding risk assessed   dorsiflexion/plantar flexion performed   ROM (active) performed  Goal: Absence of Infection Signs and Symptoms  Outcome: Progressing  Intervention: Prevent or Manage Infection  Flowsheets (Taken 1/5/2025 1631)  Infection Prevention:   equipment surfaces disinfected   hand hygiene promoted   personal protective equipment utilized   rest/sleep promoted   single patient room provided  Goal: Effective Right-Sided Heart Function  Outcome: Progressing  Intervention: Manage Decreased Right Heart Function  Flowsheets (Taken 1/5/2025 1631)  Fluid/Electrolyte Management:   fluids provided   fluids restricted  Medication Review/Management: medications reviewed     Problem: Adult Inpatient Plan of Care  Goal: Absence of Hospital-Acquired Illness or Injury  Outcome: Progressing  Intervention: Identify and Manage Fall Risk  Flowsheets (Taken 1/5/2025 1631)  Safety Promotion/Fall Prevention:   assistive device/personal item within reach   commode/urinal/bedpan at bedside   Fall Risk reviewed with patient/family   Fall Risk signage in place   instructed to call staff for mobility   medications reviewed   nonskid shoes/socks when out of bed   patient expresses understanding of fall risk  and prevention   room near unit station   side rails raised x 2  Intervention: Prevent Skin Injury  Flowsheets (Taken 1/5/2025 1631)  Body Position: position changed independently  Skin Protection:   incontinence pads utilized   transparent dressing maintained  Device Skin Pressure Protection:   absorbent pad utilized/changed   adhesive use limited   positioning supports utilized  Intervention: Prevent and Manage VTE (Venous Thromboembolism) Risk  Flowsheets (Taken 1/5/2025 1631)  VTE Prevention/Management:   ambulation promoted   bleeding precautions maintained   bleeding risk assessed   dorsiflexion/plantar flexion performed   ROM (active) performed  Intervention: Prevent Infection  Flowsheets (Taken 1/5/2025 1631)  Infection Prevention:   equipment surfaces disinfected   hand hygiene promoted   personal protective equipment utilized   rest/sleep promoted   single patient room provided     Problem: Diabetes Comorbidity  Goal: Blood Glucose Level Within Targeted Range  Outcome: Progressing  Intervention: Monitor and Manage Glycemia  Flowsheets (Taken 1/5/2025 1631)  Glycemic Management: blood glucose monitored     Problem: Fall Injury Risk  Goal: Absence of Fall and Fall-Related Injury  Outcome: Progressing  Intervention: Identify and Manage Contributors  Flowsheets (Taken 1/5/2025 1631)  Self-Care Promotion:   independence encouraged   BADL personal objects within reach   adaptive equipment use encouraged  Medication Review/Management: medications reviewed  Intervention: Promote Injury-Free Environment  Flowsheets (Taken 1/5/2025 1631)  Safety Promotion/Fall Prevention:   assistive device/personal item within reach   commode/urinal/bedpan at bedside   Fall Risk reviewed with patient/family   Fall Risk signage in place   instructed to call staff for mobility   medications reviewed   nonskid shoes/socks when out of bed   patient expresses understanding of fall risk and prevention   room near unit station   side  rails raised x 2     Problem: Heart Failure  Goal: Improved Oral Intake  Outcome: Progressing  Intervention: Promote and Optimize Nutrition Intake  Flowsheets (Taken 1/5/2025 1631)  Oral Nutrition Promotion:   adaptive equipment use encouraged   physical activity promoted   rest periods promoted   social interaction promoted  Nutrition Interventions:   food preferences provided   supplemental drinks provided  Goal: Effective Oxygenation and Ventilation  Outcome: Progressing  Intervention: Promote Airway Secretion Clearance  Flowsheets (Taken 1/5/2025 1631)  Breathing Techniques/Airway Clearance: deep/controlled cough encouraged  Cough And Deep Breathing: done independently per patient  Activity Management: Ambulated -L4  Intervention: Optimize Oxygenation and Ventilation  Flowsheets (Taken 1/5/2025 1631)  Airway/Ventilation Management:   airway patency maintained   pulmonary hygiene promoted  Head of Bed (HOB) Positioning: HOB elevated

## 2025-01-05 NOTE — ASSESSMENT & PLAN NOTE
-NICM  -Last 2D Echo 8/26/24: LVEF 5-10%, LVEDD 7.2 cm  -Hypervolemic on examination today  -Current diuretic regimen: Furosemide gtt, decrease to rate of 30mg/hr  -GDMT with home dose of Eplerenone.  Will add as tolerated   -Patient is not currently being evaluated for OHTx due to non compliance  -2g Na dietary restriction, 1500 mL fluid restriction, strict I/Os

## 2025-01-05 NOTE — PROGRESS NOTES
[facility] - Cardiac Intensive Care  Heart Transplant  Progress Note    Patient Name: Kevan Queen  MRN: [id number]  Admission Date: 12/31/2024  Hospital Length of Stay: 5 days  Attending Physician: Natalya Villatoro MD  Primary Care Provider: Rosio Armendariz FNP  Principal Problem:Acute decompensated heart failure    Subjective:   Interval History: No overnight events. Diuresed effectively overnight. Will decrease gtt to rate of 30mg/hr. Cont to monitor BID lytes.    Continuous Infusions:   furosemide (Lasix) 500 mg in 50 mL infusion (conc: 10 mg/mL)  30 mg/hr Intravenous Continuous 3 mL/hr at 01/05/25 0844 30 mg/hr at 01/05/25 0844     Scheduled Meds:   amiodarone  200 mg Oral Daily    amLODIPine  10 mg Oral Daily    aspirin  81 mg Oral Daily    atorvastatin  40 mg Oral Daily    cefadroxil  500 mg Oral Q12H    DULoxetine  30 mg Oral Daily    eplerenone  50 mg Oral Daily    gabapentin  100 mg Oral Daily    And    gabapentin  400 mg Oral QHS    insulin aspart U-100  8-11 Units Subcutaneous TIDWM    insulin glargine U-100  10 Units Subcutaneous Daily    levETIRAcetam  1,500 mg Oral BID    mupirocin   Nasal BID    potassium chloride  40 mEq Oral BID    warfarin  1.5 mg Oral Daily     PRN Meds:  Current Facility-Administered Medications:     acetaminophen, 650 mg, Oral, Q6H PRN    dextrose 50%, 12.5 g, Intravenous, PRN    dextrose 50%, 25 g, Intravenous, PRN    glucagon (human recombinant), 1 mg, Intramuscular, PRN    glucose, 16 g, Oral, PRN    glucose, 24 g, Oral, PRN    influenza, 0.5 mL, Intramuscular, vaccine x 1 dose    insulin aspart U-100, 0-10 Units, Subcutaneous, QID (AC + HS) PRN    Review of patient's allergies indicates:   Allergen Reactions    Bumex [bumetanide] Hives     Objective:     Vital Signs (Most Recent):  Temp: 97.6 °F (36.4 °C) (01/05/25 0732)  Pulse: 91 (01/05/25 0732)  Resp: 18 (01/05/25 0732)  BP: (!) 78/0 (01/05/25 0800)  SpO2: 99 % (01/05/25 0732) Vital Signs (24h Range):  Temp:   [97.6 °F (36.4 °C)-98.2 °F (36.8 °C)] 97.6 °F (36.4 °C)  Pulse:  [83-95] 91  Resp:  [18-19] 18  SpO2:  [96 %-99 %] 99 %  BP: (78-98)/(0) 78/0     Patient Vitals for the past 72 hrs (Last 3 readings):   Weight   01/05/25 0437 75.9 kg (167 lb 5.3 oz)   01/03/25 0400 83.2 kg (183 lb 6.8 oz)     Body mass index is 20.91 kg/m².      Intake/Output Summary (Last 24 hours) at 1/5/2025 1050  Last data filed at 1/5/2025 0948  Gross per 24 hour   Intake 1789.07 ml   Output 3275 ml   Net -1485.93 ml       Hemodynamic Parameters:       Telemetry: reviewed        Physical Exam  Vitals and nursing note reviewed.   Constitutional:       Appearance: Normal appearance.   HENT:      Head: Normocephalic.      Mouth/Throat:      Mouth: Mucous membranes are moist.   Eyes:      Pupils: Pupils are equal, round, and reactive to light.   Neck:      Comments: JVP elevation to mid-upper neck   Cardiovascular:      Comments: Smooth VAD hum   Pulmonary:      Effort: Pulmonary effort is normal.   Abdominal:      General: Bowel sounds are normal. There is no distension.      Palpations: Abdomen is soft.   Musculoskeletal:         General: Normal range of motion.      Right lower leg: No edema.      Left lower leg: No edema.   Skin:     General: Skin is warm.      Capillary Refill: Capillary refill takes 2 to 3 seconds.   Neurological:      General: No focal deficit present.      Mental Status: He is alert and oriented to person, place, and time.   Psychiatric:         Mood and Affect: Mood normal.         Behavior: Behavior normal.            Significant Labs:  CBC:  Recent Labs   Lab 01/03/25  0735 01/04/25  0406 01/05/25  0246   WBC 10.09 8.62 10.89   RBC 4.52* 4.46* 4.33*   HGB 8.3* 8.3* 8.0*   HCT 28.9* 28.0* 27.3*    378 391   MCV 64* 63* 63*   MCH 18.4* 18.6* 18.5*   MCHC 28.7* 29.6* 29.3*     BNP:  Recent Labs   Lab 12/31/24  0339   *     CMP:  Recent Labs   Lab 12/31/24  0339 01/01/25  0455 01/01/25  1746 01/03/25  0735  01/03/25  1114 01/04/25  0819 01/04/25  1809 01/05/25  0833   * 128*   < > 143*   < > 129* 162* 139*   CALCIUM 8.2* 8.1*   < > 8.7   < > 8.7 8.5* 8.4*   ALBUMIN 2.4* 2.4*  --  2.4*  --   --   --   --    PROT 8.6* 8.3  --  8.3  --   --   --   --    * 133*   < > 134*   < > 133* 132* 134*   K 3.7 3.2*   < > 4.0   < > 3.7 4.2 3.5   CO2 22* 22*   < > 24   < > 22* 23 24   CL 99 98   < > 99   < > 98 99 99   BUN 16 14   < > 21*   < > 19 21* 18   CREATININE 1.1 1.2   < > 1.2   < > 1.1 1.2 1.2   ALKPHOS 153* 154*  --  153*  --   --   --   --    ALT 7* 7*  --  6*  --   --   --   --    AST 38 26  --  22  --   --   --   --    BILITOT 1.8* 1.9*  --  2.0*  --   --   --   --     < > = values in this interval not displayed.      Coagulation:   Recent Labs   Lab 01/03/25  0735 01/04/25  0406 01/05/25  0246   INR 2.5* 2.5* 3.0*   APTT 30.3 31.2 32.4*     LDH:  Recent Labs   Lab 01/03/25  0735 01/04/25  0406 01/05/25  0246    243 253     Microbiology:  Microbiology Results (last 7 days)       ** No results found for the last 168 hours. **            I have reviewed all pertinent labs within the past 24 hours.    Estimated Creatinine Clearance: 88.7 mL/min (based on SCr of 1.2 mg/dL).    Diagnostic Results:  I have reviewed all pertinent imaging results/findings within the past 24 hours.  Assessment and Plan:     Kevan Queen is a 39 year old male with stage D 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 He underwent RVAD removal and chest closure 6/30/2022. He is off inotropes. He presents with one month hx of dyspnea and abdominal distention. Stated he takes lasix 80mg daily. He develops rash to both torsemide and bumex. He reports yesterday he noticed bilateral LE edema  which prompted him to come to the ED with the assistance of his family as they drove from [place]. He reports intermittent chest pain with coughing but not occurring at the moment. He denies any LVAD alarms. He reports eating salty foods yesterday.      On 11/15/23 underwent removal of eroded [employer] scICD--no Lifevest (due to LVAD) and no plan to replace ICD as not requiring therapy post LVAD with concern for reinfection.      Of note, he presented to [facility] ER  on 12/8 for dyspnea and hyperglycemia, felt better so was discharged.     Upon arrival to the ER today, he was hemodynamically stable. Labs demonstrated (hyponatremia Na 131), asiya Ca 9.5, Hgb 7.9 (at baseline), . He was given lasix IV 60mg and made about 300cc of UOP. He is admitted to HTS team for further management.          * Acute decompensated heart failure  -NICM  -Last 2D Echo 8/26/24: LVEF 5-10%, LVEDD 7.2 cm  -Hypervolemic on examination today  -Current diuretic regimen: Furosemide gtt, decrease to rate of 30mg/hr  -GDMT with home dose of Eplerenone.  Will add as tolerated   -Patient is not currently being evaluated for OHTx due to non compliance  -2g Na dietary restriction, 1500 mL fluid restriction, strict I/Os      Atrial flutter  Continue home amiodarone and anticoagulation.    LVAD (left ventricular assist device) present  -HeartMate 3 Implanted 6/23/2022 with early RV failure requiring RVAD with ProTek Duo   -Continue Coumadin,  Goal INR 2.0-3.0 . Therapeutic today.  Previous home regimen 1.5mg Qdaily  -Antiplatelets ASA 81 mg, on hold with GIB  -LDH is stable overall today. Will continue to monitor daily.  -Speed set at 5100  -Interrogation notable for no events  -Not listed for OHTx      Procedure: Device Interrogation Including analysis of device parameters  Current Settings: Ventricular Assist Device  Review of device function is stable      1/5/2025     8:03 AM 1/5/2025     4:25 AM 1/4/2025    11:19 PM  1/4/2025     7:36 PM 1/4/2025     4:57 PM 1/4/2025    11:09 AM 1/4/2025     8:22 AM   TXP LVAD INTERROGATIONS   Type HeartMate3 HeartMate3 HeartMate3  HeartMate3 HeartMate3 HeartMate3   Flow 3.9 4 4 3.9 4 4.1 4.4   Speed 5100 5100 5100 5100 5100 5100 5150   PI 5.7 5.7 5.6 5.9 5.9 5.1 4.6   Power (Serna) 3.7 3.7 3.6 3.7 3.6 3.6 3.6   LSL 4700 4700 4700 4700 4700 4700 4700   Pulsatility No Pulse Intermittent pulse Intermittent pulse Intermittent pulse Intermittent pulse No Pulse No Pulse         Type 2 diabetes mellitus with hyperglycemia, with long-term current use of insulin  A1c 10.   Resume insulin  Endocrinology consulted for BG management    HTN (hypertension)  Resume home antihypertensive - amlodipine and epleronone.    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     Tobacco use  Active tobacco user.   Patient declined nicotine patch.    Anemia of chronic disease  Hgb 7.9 at baseline. Will order iron panel.     Anxiety disorder, unspecified  Resume duloxetine.        Alice Ayala MD  Heart Transplant  [facility] - Cardiac Intensive Care

## 2025-01-05 NOTE — SUBJECTIVE & OBJECTIVE
"Interval HPI:   No acute events overnight. Patient in room [id number]/[id number] A. Blood glucose stable. BG at and above goal on current insulin regimen (SSI, prandial, and basal insulin ). Steroid use- None.    Renal function- Normal   Lab Results   Component Value Date    CREATININE 1.2 01/05/2025     Vasopressors-  None     Endocrine will continue to follow and manage insulin orders inpatient.     Diet diabetic Cardiac (Low Na/Chol), Low Sodium,2gm; 2000 Calories (up to 75 gm per meal); Fluid - 1500mL     Eating:   <25%  Nausea: No  Hypoglycemia and intervention: No  Fever: No  TPN and/or TF: No  If yes, type of TF/TPN and rate: N/A    BP (!) 78/0 (BP Location: Left arm, Patient Position: Lying)   Pulse 91   Temp 97.6 °F (36.4 °C) (Oral)   Resp 18   Ht 6' 3" (1.905 m)   Wt 75.9 kg (167 lb 5.3 oz)   SpO2 99%   BMI 20.91 kg/m²     Labs Reviewed and Include    Recent Labs   Lab 01/05/25  0833   *   CALCIUM 8.4*   *   K 3.5   CO2 24   CL 99   BUN 18   CREATININE 1.2     Lab Results   Component Value Date    WBC 10.89 01/05/2025    HGB 8.0 (L) 01/05/2025    HCT 27.3 (L) 01/05/2025    MCV 63 (L) 01/05/2025     01/05/2025     No results for input(s): "TSH", "FREET4" in the last 168 hours.  Lab Results   Component Value Date    HGBA1C 10.6 (H) 12/31/2024       Nutritional status:   Body mass index is 20.91 kg/m².  Lab Results   Component Value Date    ALBUMIN 2.4 (L) 01/03/2025    ALBUMIN 2.4 (L) 01/01/2025    ALBUMIN 2.4 (L) 12/31/2024     Lab Results   Component Value Date    PREALBUMIN 8 (L) 01/03/2025    PREALBUMIN 9 (L) 01/01/2025    PREALBUMIN 12 (L) 11/21/2024       Estimated Creatinine Clearance: 88.7 mL/min (based on SCr of 1.2 mg/dL).    Accu-Checks  Recent Labs     01/02/25  1618 01/02/25  2109 01/03/25  0838 01/03/25  1209 01/03/25  1627 01/03/25  2109 01/04/25  0842 01/04/25  1254 01/04/25  1740 01/04/25  1950   POCTGLUCOSE 198* 180* 142* 189* 219* 132* 144* 219* 141* 157* "       Current Medications and/or Treatments Impacting Glycemic Control  Immunotherapy:    Immunosuppressants       None          Steroids:   Hormones (From admission, onward)      None          Pressors:    Autonomic Drugs (From admission, onward)      None          Hyperglycemia/Diabetes Medications:   Antihyperglycemics (From admission, onward)      Start     Stop Route Frequency Ordered    01/03/25 1130  insulin aspart U-100 pen 8-11 Units         -- SubQ 3 times daily with meals 01/03/25 0841    01/01/25 0900  insulin glargine U-100 (Lantus) pen 10 Units         -- SubQ Daily 01/01/25 0813    12/31/24 1525  insulin aspart U-100 pen 0-10 Units         -- SubQ Before meals & nightly PRN 12/31/24 1428

## 2025-01-05 NOTE — PLAN OF CARE
"Called to bedside by nursing staff for severe abdominal pain, pt and nurse reporting "something hard in his abdomen" and "something soft" bothering him/causing pain. Saw patient at bedside and examined full abdomen. Soft area was ascites from volume overload and was nontender and hard area was pt's driveline and lvad. Pts discomfort tracks exactly along driveline under abdominal skin. Pt laying comfortably in bed but complains of positional pain on occasion. CT scan reviewed from 12/31 on admit as well. Ascites noted and stool burden noted. Pt likely feeling driveline more due to decreased swelling with diuresis. Pt reporting that he has felt this before and "figures that was it". Pt also has not had bowel movement today. Miralax and senna ordered for constipation. No nausea/vomiting/diarrhea. Abdomen soft, bloated from fluid. Nontender. KUB ordered as well, will f/up imaging.    Cont to monitor. Tylenol PRN available for pain.  "

## 2025-01-05 NOTE — ASSESSMENT & PLAN NOTE
-HeartMate 3 Implanted 6/23/2022 with early RV failure requiring RVAD with ProTek Duo   -Continue Coumadin,  Goal INR 2.0-3.0 . Therapeutic today.  Previous home regimen 1.5mg Qdaily  -Antiplatelets ASA 81 mg, on hold with GIB  -LDH is stable overall today. Will continue to monitor daily.  -Speed set at 5100  -Interrogation notable for no events  -Not listed for OHTx      Procedure: Device Interrogation Including analysis of device parameters  Current Settings: Ventricular Assist Device  Review of device function is stable      1/5/2025     8:03 AM 1/5/2025     4:25 AM 1/4/2025    11:19 PM 1/4/2025     7:36 PM 1/4/2025     4:57 PM 1/4/2025    11:09 AM 1/4/2025     8:22 AM   TXP LVAD INTERROGATIONS   Type HeartMate3 HeartMate3 HeartMate3  HeartMate3 HeartMate3 HeartMate3   Flow 3.9 4 4 3.9 4 4.1 4.4   Speed 5100 5100 5100 5100 5100 5100 5150   PI 5.7 5.7 5.6 5.9 5.9 5.1 4.6   Power (Serna) 3.7 3.7 3.6 3.7 3.6 3.6 3.6   LSL 4700 4700 4700 4700 4700 4700 4700   Pulsatility No Pulse Intermittent pulse Intermittent pulse Intermittent pulse Intermittent pulse No Pulse No Pulse

## 2025-01-05 NOTE — ASSESSMENT & PLAN NOTE
Endocrinology consulted for BG management.   BG goal 140-180    - Lantus (Insulin Glargine) 10 units daily   - Novolog (Insulin Aspart) 8-11 units TIDWM. Administer 8 units if patient eats 25% -  50% and 11 units if patient eats 50% or more. HOLD if patient is NPO, unable to eat, or if Blood Glucose is less than 100 mg/dL.   - [facility] SSI (150/25)  - BG checks AC/HS  - Hypoglycemia protocol in place    ** Please notify Endocrine for any change and/or advance in diet**  ** Please call Endocrine for any BG related issues **    Discharge Planning:   TBD. Please notify endocrinology prior to discharge.

## 2025-01-05 NOTE — NURSING
While getting patient's midnight doppler, I noticed he had a bottle of root beer and water on his bedside table. I asked him where did he get it from, which he replayed he went got it himself. There also was an snicker and chips wrapper on the floor. I educated patient on his 1.5 mL FR. He stated he understands. I physically did not see patient leave room, will closely monitor him .

## 2025-01-06 LAB
ALBUMIN SERPL BCP-MCNC: 2.6 G/DL (ref 3.5–5.2)
ALP SERPL-CCNC: 162 U/L (ref 40–150)
ALT SERPL W/O P-5'-P-CCNC: 6 U/L (ref 10–44)
ANION GAP SERPL CALC-SCNC: 10 MMOL/L (ref 8–16)
ANION GAP SERPL CALC-SCNC: 8 MMOL/L (ref 8–16)
APTT PPP: 33.7 SEC (ref 21–32)
AST SERPL-CCNC: 27 U/L (ref 10–40)
BASOPHILS # BLD AUTO: 0.03 K/UL (ref 0–0.2)
BASOPHILS NFR BLD: 0.3 % (ref 0–1.9)
BILIRUB DIRECT SERPL-MCNC: 1.2 MG/DL (ref 0.1–0.3)
BILIRUB SERPL-MCNC: 1.8 MG/DL (ref 0.1–1)
BUN SERPL-MCNC: 17 MG/DL (ref 6–20)
BUN SERPL-MCNC: 18 MG/DL (ref 6–20)
CALCIUM SERPL-MCNC: 8.5 MG/DL (ref 8.7–10.5)
CALCIUM SERPL-MCNC: 8.5 MG/DL (ref 8.7–10.5)
CHLORIDE SERPL-SCNC: 98 MMOL/L (ref 95–110)
CHLORIDE SERPL-SCNC: 98 MMOL/L (ref 95–110)
CO2 SERPL-SCNC: 24 MMOL/L (ref 23–29)
CO2 SERPL-SCNC: 26 MMOL/L (ref 23–29)
CREAT SERPL-MCNC: 1.1 MG/DL (ref 0.5–1.4)
CREAT SERPL-MCNC: 1.2 MG/DL (ref 0.5–1.4)
CRP SERPL-MCNC: 20.6 MG/L (ref 0–8.2)
DIFFERENTIAL METHOD BLD: ABNORMAL
EOSINOPHIL # BLD AUTO: 0 K/UL (ref 0–0.5)
EOSINOPHIL NFR BLD: 0.3 % (ref 0–8)
ERYTHROCYTE [DISTWIDTH] IN BLOOD BY AUTOMATED COUNT: 23.9 % (ref 11.5–14.5)
EST. GFR  (NO RACE VARIABLE): >60 ML/MIN/1.73 M^2
EST. GFR  (NO RACE VARIABLE): >60 ML/MIN/1.73 M^2
GLUCOSE SERPL-MCNC: 118 MG/DL (ref 70–110)
GLUCOSE SERPL-MCNC: 94 MG/DL (ref 70–110)
HCT VFR BLD AUTO: 29.8 % (ref 40–54)
HGB BLD-MCNC: 8.3 G/DL (ref 14–18)
IMM GRANULOCYTES # BLD AUTO: 0.05 K/UL (ref 0–0.04)
IMM GRANULOCYTES NFR BLD AUTO: 0.4 % (ref 0–0.5)
INR PPP: 3.2 (ref 0.8–1.2)
LDH SERPL L TO P-CCNC: 273 U/L (ref 110–260)
LYMPHOCYTES # BLD AUTO: 0.8 K/UL (ref 1–4.8)
LYMPHOCYTES NFR BLD: 7.3 % (ref 18–48)
MAGNESIUM SERPL-MCNC: 2 MG/DL (ref 1.6–2.6)
MCH RBC QN AUTO: 17.8 PG (ref 27–31)
MCHC RBC AUTO-ENTMCNC: 27.9 G/DL (ref 32–36)
MCV RBC AUTO: 64 FL (ref 82–98)
MONOCYTES # BLD AUTO: 0.9 K/UL (ref 0.3–1)
MONOCYTES NFR BLD: 7.5 % (ref 4–15)
NEUTROPHILS # BLD AUTO: 9.7 K/UL (ref 1.8–7.7)
NEUTROPHILS NFR BLD: 84.2 % (ref 38–73)
NRBC BLD-RTO: 0 /100 WBC
PHOSPHATE SERPL-MCNC: 3.9 MG/DL (ref 2.7–4.5)
PLATELET # BLD AUTO: 434 K/UL (ref 150–450)
PMV BLD AUTO: 9 FL (ref 9.2–12.9)
POCT GLUCOSE: 117 MG/DL (ref 70–110)
POCT GLUCOSE: 127 MG/DL (ref 70–110)
POCT GLUCOSE: 239 MG/DL (ref 70–110)
POCT GLUCOSE: 99 MG/DL (ref 70–110)
POTASSIUM SERPL-SCNC: 3.5 MMOL/L (ref 3.5–5.1)
POTASSIUM SERPL-SCNC: 3.6 MMOL/L (ref 3.5–5.1)
PREALB SERPL-MCNC: 9 MG/DL (ref 20–43)
PROT SERPL-MCNC: 8.5 G/DL (ref 6–8.4)
PROTHROMBIN TIME: 32.6 SEC (ref 9–12.5)
RBC # BLD AUTO: 4.66 M/UL (ref 4.6–6.2)
SODIUM SERPL-SCNC: 132 MMOL/L (ref 136–145)
SODIUM SERPL-SCNC: 132 MMOL/L (ref 136–145)
WBC # BLD AUTO: 11.55 K/UL (ref 3.9–12.7)

## 2025-01-06 PROCEDURE — 25000003 PHARM REV CODE 250: Performed by: STUDENT IN AN ORGANIZED HEALTH CARE EDUCATION/TRAINING PROGRAM

## 2025-01-06 PROCEDURE — 27000248 HC VAD-ADDITIONAL DAY

## 2025-01-06 PROCEDURE — 83735 ASSAY OF MAGNESIUM: CPT | Performed by: STUDENT IN AN ORGANIZED HEALTH CARE EDUCATION/TRAINING PROGRAM

## 2025-01-06 PROCEDURE — 80076 HEPATIC FUNCTION PANEL: CPT | Performed by: STUDENT IN AN ORGANIZED HEALTH CARE EDUCATION/TRAINING PROGRAM

## 2025-01-06 PROCEDURE — 93750 INTERROGATION VAD IN PERSON: CPT | Mod: ,,, | Performed by: INTERNAL MEDICINE

## 2025-01-06 PROCEDURE — 83615 LACTATE (LD) (LDH) ENZYME: CPT | Performed by: STUDENT IN AN ORGANIZED HEALTH CARE EDUCATION/TRAINING PROGRAM

## 2025-01-06 PROCEDURE — 36415 COLL VENOUS BLD VENIPUNCTURE: CPT | Performed by: STUDENT IN AN ORGANIZED HEALTH CARE EDUCATION/TRAINING PROGRAM

## 2025-01-06 PROCEDURE — 85610 PROTHROMBIN TIME: CPT | Performed by: STUDENT IN AN ORGANIZED HEALTH CARE EDUCATION/TRAINING PROGRAM

## 2025-01-06 PROCEDURE — 63600175 PHARM REV CODE 636 W HCPCS

## 2025-01-06 PROCEDURE — 84100 ASSAY OF PHOSPHORUS: CPT | Performed by: STUDENT IN AN ORGANIZED HEALTH CARE EDUCATION/TRAINING PROGRAM

## 2025-01-06 PROCEDURE — 36415 COLL VENOUS BLD VENIPUNCTURE: CPT | Mod: XB | Performed by: STUDENT IN AN ORGANIZED HEALTH CARE EDUCATION/TRAINING PROGRAM

## 2025-01-06 PROCEDURE — 25000003 PHARM REV CODE 250

## 2025-01-06 PROCEDURE — 36415 COLL VENOUS BLD VENIPUNCTURE: CPT

## 2025-01-06 PROCEDURE — 25000003 PHARM REV CODE 250: Performed by: INTERNAL MEDICINE

## 2025-01-06 PROCEDURE — 86140 C-REACTIVE PROTEIN: CPT | Performed by: STUDENT IN AN ORGANIZED HEALTH CARE EDUCATION/TRAINING PROGRAM

## 2025-01-06 PROCEDURE — 80048 BASIC METABOLIC PNL TOTAL CA: CPT | Mod: 91 | Performed by: STUDENT IN AN ORGANIZED HEALTH CARE EDUCATION/TRAINING PROGRAM

## 2025-01-06 PROCEDURE — 80048 BASIC METABOLIC PNL TOTAL CA: CPT

## 2025-01-06 PROCEDURE — 99232 SBSQ HOSP IP/OBS MODERATE 35: CPT | Mod: ,,,

## 2025-01-06 PROCEDURE — 85730 THROMBOPLASTIN TIME PARTIAL: CPT | Performed by: STUDENT IN AN ORGANIZED HEALTH CARE EDUCATION/TRAINING PROGRAM

## 2025-01-06 PROCEDURE — 84134 ASSAY OF PREALBUMIN: CPT | Performed by: STUDENT IN AN ORGANIZED HEALTH CARE EDUCATION/TRAINING PROGRAM

## 2025-01-06 PROCEDURE — 99233 SBSQ HOSP IP/OBS HIGH 50: CPT | Mod: ,,, | Performed by: INTERNAL MEDICINE

## 2025-01-06 PROCEDURE — 63600175 PHARM REV CODE 636 W HCPCS: Performed by: STUDENT IN AN ORGANIZED HEALTH CARE EDUCATION/TRAINING PROGRAM

## 2025-01-06 PROCEDURE — 21400001 HC TELEMETRY ROOM

## 2025-01-06 PROCEDURE — 85025 COMPLETE CBC W/AUTO DIFF WBC: CPT | Performed by: STUDENT IN AN ORGANIZED HEALTH CARE EDUCATION/TRAINING PROGRAM

## 2025-01-06 RX ORDER — INSULIN ASPART 100 [IU]/ML
6-10 INJECTION, SOLUTION INTRAVENOUS; SUBCUTANEOUS
Status: DISCONTINUED | OUTPATIENT
Start: 2025-01-06 | End: 2025-01-09 | Stop reason: HOSPADM

## 2025-01-06 RX ORDER — HYDROXYZINE HYDROCHLORIDE 25 MG/1
25 TABLET, FILM COATED ORAL 3 TIMES DAILY PRN
Status: DISCONTINUED | OUTPATIENT
Start: 2025-01-06 | End: 2025-01-06

## 2025-01-06 RX ORDER — HYDROXYZINE HYDROCHLORIDE 25 MG/1
50 TABLET, FILM COATED ORAL 3 TIMES DAILY PRN
Status: COMPLETED | OUTPATIENT
Start: 2025-01-06 | End: 2025-01-08

## 2025-01-06 RX ORDER — WARFARIN 1 MG/1
1 TABLET ORAL
Status: DISCONTINUED | OUTPATIENT
Start: 2025-01-06 | End: 2025-01-09 | Stop reason: HOSPADM

## 2025-01-06 RX ORDER — ALPRAZOLAM 0.5 MG/1
0.5 TABLET ORAL ONCE
Status: COMPLETED | OUTPATIENT
Start: 2025-01-06 | End: 2025-01-06

## 2025-01-06 RX ADMIN — CEFADROXIL 500 MG: 500 CAPSULE ORAL at 09:01

## 2025-01-06 RX ADMIN — LEVETIRACETAM 1500 MG: 500 TABLET, FILM COATED ORAL at 09:01

## 2025-01-06 RX ADMIN — INSULIN ASPART 10 UNITS: 100 INJECTION, SOLUTION INTRAVENOUS; SUBCUTANEOUS at 06:01

## 2025-01-06 RX ADMIN — ASPIRIN 81 MG: 81 TABLET, COATED ORAL at 09:01

## 2025-01-06 RX ADMIN — POTASSIUM CHLORIDE 40 MEQ: 1500 TABLET, EXTENDED RELEASE ORAL at 09:01

## 2025-01-06 RX ADMIN — INSULIN ASPART 2 UNITS: 100 INJECTION, SOLUTION INTRAVENOUS; SUBCUTANEOUS at 09:01

## 2025-01-06 RX ADMIN — EPLERENONE 50 MG: 25 TABLET, FILM COATED ORAL at 09:01

## 2025-01-06 RX ADMIN — AMLODIPINE BESYLATE 10 MG: 10 TABLET ORAL at 09:01

## 2025-01-06 RX ADMIN — ATORVASTATIN CALCIUM 40 MG: 40 TABLET, FILM COATED ORAL at 09:01

## 2025-01-06 RX ADMIN — WARFARIN SODIUM 1 MG: 1 TABLET ORAL at 04:01

## 2025-01-06 RX ADMIN — CEFADROXIL 500 MG: 500 CAPSULE ORAL at 11:01

## 2025-01-06 RX ADMIN — DULOXETINE HYDROCHLORIDE 30 MG: 30 CAPSULE, DELAYED RELEASE ORAL at 09:01

## 2025-01-06 RX ADMIN — AMIODARONE HYDROCHLORIDE 200 MG: 200 TABLET ORAL at 09:01

## 2025-01-06 RX ADMIN — ALPRAZOLAM 0.5 MG: 0.5 TABLET ORAL at 04:01

## 2025-01-06 RX ADMIN — INSULIN GLARGINE 10 UNITS: 100 INJECTION, SOLUTION SUBCUTANEOUS at 09:01

## 2025-01-06 RX ADMIN — INSULIN ASPART 6 UNITS: 100 INJECTION, SOLUTION INTRAVENOUS; SUBCUTANEOUS at 01:01

## 2025-01-06 RX ADMIN — HYDROXYZINE HYDROCHLORIDE 25 MG: 25 TABLET ORAL at 01:01

## 2025-01-06 RX ADMIN — FUROSEMIDE 20 MG/HR: 10 INJECTION, SOLUTION INTRAMUSCULAR; INTRAVENOUS at 09:01

## 2025-01-06 RX ADMIN — GABAPENTIN 400 MG: 400 CAPSULE ORAL at 09:01

## 2025-01-06 RX ADMIN — INSULIN ASPART 10 UNITS: 100 INJECTION, SOLUTION INTRAVENOUS; SUBCUTANEOUS at 09:01

## 2025-01-06 RX ADMIN — FUROSEMIDE 30 MG/HR: 10 INJECTION, SOLUTION INTRAMUSCULAR; INTRAVENOUS at 05:01

## 2025-01-06 RX ADMIN — GABAPENTIN 100 MG: 100 CAPSULE ORAL at 09:01

## 2025-01-06 NOTE — PROGRESS NOTES
01/06/2025  Mirela Chris    Current provider:  Natalya Villatoro MD    Device interrogation:      1/5/2025    11:57 PM 1/5/2025     7:43 PM 1/5/2025     3:02 PM 1/5/2025    11:29 AM 1/5/2025     8:03 AM 1/5/2025     4:25 AM 1/4/2025    11:19 PM   TXP LVAD INTERROGATIONS   Type HeartMate3 HeartMate3 HeartMate3 HeartMate3 HeartMate3 HeartMate3 HeartMate3   Flow 4.1 4.1 3.9 3.8 3.9 4 4   Speed 5150 5100 5100 5100 5100 5100 5100   PI 5.3 4.7 5.9 6.3 5.7 5.7 5.6   Power (Serna) 3.6 3.6 3.5 3.6 3.7 3.7 3.6   LSL 4700 4700 4700 4700 4700 4700 4700   Pulsatility Intermittent pulse Intermittent pulse Intermittent pulse Intermittent pulse No Pulse Intermittent pulse Intermittent pulse          Rounded on Kevan Queen to ensure all mechanical assist device settings (IABP or VAD) were appropriate and all parameters were within limits.  I was able to ensure all back up equipment was present, the staff had no issues, and the Perfusion Department daily rounding was complete.      For implantable VADs: Interrogation of Ventricular assist device was performed with analysis of device parameters and review of device function. I have personally reviewed the interrogation findings and agree with findings as stated.     In emergency, the nursing units have been notified to contact the perfusion department either by:  Calling [phone] from 630am to 4pm Mon thru Fri, utilizing the On-Call Finder functionality of Epic and searching for Perfusion, or by contacting the hospital  from 4pm to 630am and on weekends and asking to speak with the perfusionist on call.    7:22 AM

## 2025-01-06 NOTE — PROGRESS NOTES
[facility] - Cardiac Intensive Care  Heart Transplant  Progress Note    Patient Name: Kevan Queen  MRN: [id number]  Admission Date: 12/31/2024  Hospital Length of Stay: 6 days  Attending Physician: Dalia Crum MD  Primary Care Provider: Rosio Armendariz FNP  Principal Problem:Acute decompensated heart failure    Subjective:   Interval History: No overnight events. Net negative 1.1 L in last 24 hours. Will decrease gtt to rate of 20mg/hr. Cont to monitor BID lytes.    Reporting anxiety not improving with hydroxyzine, one time xanax ordered.     Continuous Infusions:   furosemide (Lasix) 500 mg in 50 mL infusion (conc: 10 mg/mL)  20 mg/hr Intravenous Continuous 2 mL/hr at 01/06/25 1525 20 mg/hr at 01/06/25 1525     Scheduled Meds:   ALPRAZolam  0.5 mg Oral Once    amiodarone  200 mg Oral Daily    amLODIPine  10 mg Oral Daily    aspirin  81 mg Oral Daily    atorvastatin  40 mg Oral Daily    cefadroxil  500 mg Oral Q12H    DULoxetine  30 mg Oral Daily    eplerenone  50 mg Oral Daily    gabapentin  100 mg Oral Daily    And    gabapentin  400 mg Oral QHS    insulin aspart U-100  6-10 Units Subcutaneous TIDWM    insulin glargine U-100  10 Units Subcutaneous Daily    lactulose  10 g Oral TID    levETIRAcetam  1,500 mg Oral BID    mupirocin   Nasal BID    polyethylene glycol  17 g Oral Daily    potassium chloride  40 mEq Oral BID    senna-docusate 8.6-50 mg  1 tablet Oral Daily    warfarin  1 mg Oral Every Mon, Fri    And    [START ON 1/7/2025] warfarin  1.5 mg Oral Once per day on Sunday Tuesday Wednesday Thursday Saturday     PRN Meds:  Current Facility-Administered Medications:     acetaminophen, 650 mg, Oral, Q6H PRN    dextrose 50%, 12.5 g, Intravenous, PRN    dextrose 50%, 25 g, Intravenous, PRN    glucagon (human recombinant), 1 mg, Intramuscular, PRN    glucose, 16 g, Oral, PRN    glucose, 24 g, Oral, PRN    hydrOXYzine HCL, 50 mg, Oral, TID PRN    influenza, 0.5 mL, Intramuscular, vaccine x 1 dose     insulin aspart U-100, 0-10 Units, Subcutaneous, QID (AC + HS) PRN    Review of patient's allergies indicates:   Allergen Reactions    Bumex [bumetanide] Hives     Objective:     Vital Signs (Most Recent):  Temp: 98.4 °F (36.9 °C) (01/06/25 1522)  Pulse: 82 (01/06/25 1522)  Resp: 18 (01/06/25 1522)  BP: (!) 76/0 (01/06/25 1522)  SpO2: (!) 94 % (01/06/25 1522) Vital Signs (24h Range):  Temp:  [97.4 °F (36.3 °C)-98.4 °F (36.9 °C)] 98.4 °F (36.9 °C)  Pulse:  [70-97] 82  Resp:  [16-18] 18  SpO2:  [94 %-98 %] 94 %  BP: ()/(0-70) 76/0     Patient Vitals for the past 72 hrs (Last 3 readings):   Weight   01/06/25 0422 73.4 kg (161 lb 13.1 oz)   01/05/25 0437 75.9 kg (167 lb 5.3 oz)     Body mass index is 20.23 kg/m².      Intake/Output Summary (Last 24 hours) at 1/6/2025 1612  Last data filed at 1/6/2025 1320  Gross per 24 hour   Intake 895.35 ml   Output 3300 ml   Net -2404.65 ml       Hemodynamic Parameters:       Telemetry: reviewed        Physical Exam  Vitals and nursing note reviewed.   Constitutional:       Appearance: Normal appearance.   HENT:      Head: Normocephalic.      Mouth/Throat:      Mouth: Mucous membranes are moist.   Eyes:      Pupils: Pupils are equal, round, and reactive to light.   Neck:      Comments: JVP elevation to mid-upper neck   Cardiovascular:      Comments: Smooth VAD hum   Pulmonary:      Effort: Pulmonary effort is normal.   Abdominal:      General: Bowel sounds are normal. There is no distension.      Palpations: Abdomen is soft.   Musculoskeletal:         General: Normal range of motion.      Right lower leg: No edema.      Left lower leg: No edema.   Skin:     General: Skin is warm.      Capillary Refill: Capillary refill takes 2 to 3 seconds.   Neurological:      General: No focal deficit present.      Mental Status: He is alert and oriented to person, place, and time.   Psychiatric:         Mood and Affect: Mood normal.         Behavior: Behavior normal.            Significant  Labs:  CBC:  Recent Labs   Lab 01/04/25  0406 01/05/25  0246 01/06/25  0336   WBC 8.62 10.89 11.55   RBC 4.46* 4.33* 4.66   HGB 8.3* 8.0* 8.3*   HCT 28.0* 27.3* 29.8*    391 434   MCV 63* 63* 64*   MCH 18.6* 18.5* 17.8*   MCHC 29.6* 29.3* 27.9*     BNP:  Recent Labs   Lab 12/31/24  0339   *     CMP:  Recent Labs   Lab 01/03/25  0735 01/03/25  1114 01/05/25  0833 01/05/25  1740 01/06/25  0336 01/06/25  1104   *   < > 139* 119*  --  94   CALCIUM 8.7   < > 8.4* 8.5*  --  8.5*   ALBUMIN 2.4*  --   --  2.6* 2.6*  --    PROT 8.3  --   --  8.8* 8.5*  --    *   < > 134* 134*  --  132*   K 4.0   < > 3.5 3.7  --  3.6   CO2 24   < > 24 27  --  24   CL 99   < > 99 98  --  98   BUN 21*   < > 18 17  --  17   CREATININE 1.2   < > 1.2 1.2  --  1.1   ALKPHOS 153*  --   --  153* 162*  --    ALT 6*  --   --  6* 6*  --    AST 22  --   --  23 27  --    BILITOT 2.0*  --   --  1.7* 1.8*  --     < > = values in this interval not displayed.      Coagulation:   Recent Labs   Lab 01/04/25 0406 01/05/25 0246 01/06/25  0336   INR 2.5* 3.0* 3.2*   APTT 31.2 32.4* 33.7*     LDH:  Recent Labs   Lab 01/04/25 0406 01/05/25  0246 01/06/25  0336    253 273*     Microbiology:  Microbiology Results (last 7 days)       ** No results found for the last 168 hours. **            I have reviewed all pertinent labs within the past 24 hours.    Estimated Creatinine Clearance: 93.6 mL/min (based on SCr of 1.1 mg/dL).    Diagnostic Results:  I have reviewed all pertinent imaging results/findings within the past 24 hours.  Assessment and Plan:     Kevan Queen is a 39 year old male with stage D 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 He underwent RVAD removal and chest closure  6/30/2022. He is off inotropes. He presents with one month hx of dyspnea and abdominal distention. Stated he takes lasix 80mg daily. He develops rash to both torsemide and bumex. He reports yesterday he noticed bilateral LE edema which prompted him to come to the ED with the assistance of his family as they drove from [place]. He reports intermittent chest pain with coughing but not occurring at the moment. He denies any LVAD alarms. He reports eating salty foods yesterday.      On 11/15/23 underwent removal of eroded [employer] scICD--no Lifevest (due to LVAD) and no plan to replace ICD as not requiring therapy post LVAD with concern for reinfection.      Of note, he presented to [facility] ER  on 12/8 for dyspnea and hyperglycemia, felt better so was discharged.     Upon arrival to the ER today, he was hemodynamically stable. Labs demonstrated (hyponatremia Na 131), asiya Ca 9.5, Hgb 7.9 (at baseline), . He was given lasix IV 60mg and made about 300cc of UOP. He is admitted to HTS team for further management.          * Acute decompensated heart failure  -NICM  -Last 2D Echo 8/26/24: LVEF 5-10%, LVEDD 7.2 cm    -Current diuretic regimen: Furosemide gtt, decreased to rate of 20mg/hr  -GDMT with home dose of Eplerenone.  Will add as tolerated   -Patient is not currently being evaluated for OHTx due to non compliance  -2g Na dietary restriction, 1500 mL fluid restriction, strict I/Os      LVAD (left ventricular assist device) present  -HeartMate 3 Implanted 6/23/2022 with early RV failure requiring RVAD with ProTek Duo   -Continue Coumadin,  Goal INR 2.0-3.0 . Therapeutic today.  Previous home regimen 1.5mg Qdaily  -Antiplatelets ASA 81 mg, on hold with GIB  -LDH is stable overall today. Will continue to monitor daily.  -Speed set at 5100  -Interrogation notable for no events  -Not listed for OHTx      Procedure: Device Interrogation Including analysis of device parameters  Current Settings:  Ventricular Assist Device  Review of device function is stable      1/6/2025     3:23 PM 1/6/2025    11:45 AM 1/6/2025     7:58 AM 1/5/2025    11:57 PM 1/5/2025     7:43 PM 1/5/2025     3:02 PM 1/5/2025    11:29 AM   TXP LVAD INTERROGATIONS   Type HeartMate3 HeartMate3 HeartMate3 HeartMate3 HeartMate3 HeartMate3 HeartMate3   Flow 4.1 4 3.9 4.1 4.1 3.9 3.8   Speed 5100 5100 5100 5150 5100 5100 5100   PI 4.4 5.1 6 5.3 4.7 5.9 6.3   Power (Serna) 3.6 3.6 3.5 3.6 3.6 3.5 3.6   LSL 4700 4700 4700 4700 4700 4700 4700   Pulsatility No Pulse Intermittent pulse Intermittent pulse Intermittent pulse Intermittent pulse Intermittent pulse Intermittent pulse         Atrial flutter  Continue home amiodarone and anticoagulation.    Type 2 diabetes mellitus with hyperglycemia, with long-term current use of insulin  A1c 10.   Resume insulin  Endocrinology consulted for BG management    HTN (hypertension)  Resume home antihypertensive - amlodipine and epleronone.    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     Tobacco use  Active tobacco user.   Patient declined nicotine patch.    Anemia of chronic disease  Hgb 7.9 at baseline. Will order iron panel.     Anxiety  Resume duloxetine.  Hydroxyzine PRN  One time xanax on 1/6        Enrique Vazquez MD  Heart Transplant  [facility] - [facility]

## 2025-01-06 NOTE — PROGRESS NOTES
01/05/25 2330   Vital Signs   BP (!) 68/0   BP Location Left arm   BP Method Doppler   Patient Position Lying     Patient's doppler below range. Patient's is asymptotic, denies any headache.  notified, no new orders given. Will monitor patient for changes.

## 2025-01-06 NOTE — SUBJECTIVE & OBJECTIVE
"Interval HPI:   No acute events overnight. Patient in room [id number]/[id number] A. Blood glucose stable. BG at and below goal on current insulin regimen (SSI, prandial, and basal insulin ). Steroid use- None.    Renal function- Normal   Lab Results   Component Value Date    CREATININE 1.2 2025     Vasopressors-  None     Endocrine will continue to follow and manage insulin orders inpatient.     Diet diabetic Cardiac (Low Na/Chol), Low Sodium,2gm; 2000 Calories (up to 75 gm per meal); Fluid - 1500mL     Eatin%  Nausea: No  Hypoglycemia and intervention: No  Fever: No  TPN and/or TF: No  If yes, type of TF/TPN and rate: N/A    BP (!) 72/0 (BP Location: Right arm, Patient Position: Lying)   Pulse 70   Temp 97.8 °F (36.6 °C) (Oral)   Resp 18   Ht 6' 3" (1.905 m)   Wt 73.4 kg (161 lb 13.1 oz)   SpO2 97%   BMI 20.23 kg/m²     Labs Reviewed and Include    Recent Labs   Lab 25  1740 25  0336   *  --    CALCIUM 8.5*  --    ALBUMIN 2.6* 2.6*   PROT 8.8* 8.5*   *  --    K 3.7  --    CO2 27  --    CL 98  --    BUN 17  --    CREATININE 1.2  --    ALKPHOS 153* 162*   ALT 6* 6*   AST 23 27   BILITOT 1.7* 1.8*     Lab Results   Component Value Date    WBC 11.55 2025    HGB 8.3 (L) 2025    HCT 29.8 (L) 2025    MCV 64 (L) 2025     2025     No results for input(s): "TSH", "FREET4" in the last 168 hours.  Lab Results   Component Value Date    HGBA1C 10.6 (H) 2024       Nutritional status:   Body mass index is 20.23 kg/m².  Lab Results   Component Value Date    ALBUMIN 2.6 (L) 2025    ALBUMIN 2.6 (L) 2025    ALBUMIN 2.4 (L) 2025     Lab Results   Component Value Date    PREALBUMIN 9 (L) 2025    PREALBUMIN 8 (L) 2025    PREALBUMIN 9 (L) 2025       Estimated Creatinine Clearance: 85.8 mL/min (based on SCr of 1.2 mg/dL).    Accu-Checks  Recent Labs     25  2109 25  0842 25  1254 25  1740 " 01/04/25  1950 01/05/25  0849 01/05/25  1304 01/05/25  1657 01/05/25  1942 01/06/25  0821   POCTGLUCOSE 132* 144* 219* 141* 157* 143* 238* 168* 108 127*       Current Medications and/or Treatments Impacting Glycemic Control  Immunotherapy:    Immunosuppressants       None          Steroids:   Hormones (From admission, onward)      None          Pressors:    Autonomic Drugs (From admission, onward)      None          Hyperglycemia/Diabetes Medications:   Antihyperglycemics (From admission, onward)      Start     Stop Route Frequency Ordered    01/06/25 1130  insulin aspart U-100 pen 6-10 Units         -- SubQ 3 times daily with meals 01/06/25 0901    01/01/25 0900  insulin glargine U-100 (Lantus) pen 10 Units         -- SubQ Daily 01/01/25 0813    12/31/24 1525  insulin aspart U-100 pen 0-10 Units         -- SubQ Before meals & nightly PRN 12/31/24 1426

## 2025-01-06 NOTE — SUBJECTIVE & OBJECTIVE
Interval History: No overnight events. Net negative 1.1 L in last 24 hours. Will decrease gtt to rate of 20mg/hr. Cont to monitor BID lytes.    Reporting anxiety not improving with hydroxyzine, one time xanax ordered.     Continuous Infusions:   furosemide (Lasix) 500 mg in 50 mL infusion (conc: 10 mg/mL)  20 mg/hr Intravenous Continuous 2 mL/hr at 01/06/25 1525 20 mg/hr at 01/06/25 1525     Scheduled Meds:   ALPRAZolam  0.5 mg Oral Once    amiodarone  200 mg Oral Daily    amLODIPine  10 mg Oral Daily    aspirin  81 mg Oral Daily    atorvastatin  40 mg Oral Daily    cefadroxil  500 mg Oral Q12H    DULoxetine  30 mg Oral Daily    eplerenone  50 mg Oral Daily    gabapentin  100 mg Oral Daily    And    gabapentin  400 mg Oral QHS    insulin aspart U-100  6-10 Units Subcutaneous TIDWM    insulin glargine U-100  10 Units Subcutaneous Daily    lactulose  10 g Oral TID    levETIRAcetam  1,500 mg Oral BID    mupirocin   Nasal BID    polyethylene glycol  17 g Oral Daily    potassium chloride  40 mEq Oral BID    senna-docusate 8.6-50 mg  1 tablet Oral Daily    warfarin  1 mg Oral Every Mon, Fri    And    [START ON 1/7/2025] warfarin  1.5 mg Oral Once per day on Sunday Tuesday Wednesday Thursday Saturday     PRN Meds:  Current Facility-Administered Medications:     acetaminophen, 650 mg, Oral, Q6H PRN    dextrose 50%, 12.5 g, Intravenous, PRN    dextrose 50%, 25 g, Intravenous, PRN    glucagon (human recombinant), 1 mg, Intramuscular, PRN    glucose, 16 g, Oral, PRN    glucose, 24 g, Oral, PRN    hydrOXYzine HCL, 50 mg, Oral, TID PRN    influenza, 0.5 mL, Intramuscular, vaccine x 1 dose    insulin aspart U-100, 0-10 Units, Subcutaneous, QID (AC + HS) PRN    Review of patient's allergies indicates:   Allergen Reactions    Bumex [bumetanide] Hives     Objective:     Vital Signs (Most Recent):  Temp: 98.4 °F (36.9 °C) (01/06/25 1522)  Pulse: 82 (01/06/25 1522)  Resp: 18 (01/06/25 1522)  BP: (!) 76/0 (01/06/25 1522)  SpO2: (!) 94  % (01/06/25 1522) Vital Signs (24h Range):  Temp:  [97.4 °F (36.3 °C)-98.4 °F (36.9 °C)] 98.4 °F (36.9 °C)  Pulse:  [70-97] 82  Resp:  [16-18] 18  SpO2:  [94 %-98 %] 94 %  BP: ()/(0-70) 76/0     Patient Vitals for the past 72 hrs (Last 3 readings):   Weight   01/06/25 0422 73.4 kg (161 lb 13.1 oz)   01/05/25 0437 75.9 kg (167 lb 5.3 oz)     Body mass index is 20.23 kg/m².      Intake/Output Summary (Last 24 hours) at 1/6/2025 1612  Last data filed at 1/6/2025 1320  Gross per 24 hour   Intake 895.35 ml   Output 3300 ml   Net -2404.65 ml       Hemodynamic Parameters:       Telemetry: reviewed        Physical Exam  Vitals and nursing note reviewed.   Constitutional:       Appearance: Normal appearance.   HENT:      Head: Normocephalic.      Mouth/Throat:      Mouth: Mucous membranes are moist.   Eyes:      Pupils: Pupils are equal, round, and reactive to light.   Neck:      Comments: JVP elevation to mid-upper neck   Cardiovascular:      Comments: Smooth VAD hum   Pulmonary:      Effort: Pulmonary effort is normal.   Abdominal:      General: Bowel sounds are normal. There is no distension.      Palpations: Abdomen is soft.   Musculoskeletal:         General: Normal range of motion.      Right lower leg: No edema.      Left lower leg: No edema.   Skin:     General: Skin is warm.      Capillary Refill: Capillary refill takes 2 to 3 seconds.   Neurological:      General: No focal deficit present.      Mental Status: He is alert and oriented to person, place, and time.   Psychiatric:         Mood and Affect: Mood normal.         Behavior: Behavior normal.            Significant Labs:  CBC:  Recent Labs   Lab 01/04/25  0406 01/05/25  0246 01/06/25  0336   WBC 8.62 10.89 11.55   RBC 4.46* 4.33* 4.66   HGB 8.3* 8.0* 8.3*   HCT 28.0* 27.3* 29.8*    391 434   MCV 63* 63* 64*   MCH 18.6* 18.5* 17.8*   MCHC 29.6* 29.3* 27.9*     BNP:  Recent Labs   Lab 12/31/24  0339   *     CMP:  Recent Labs   Lab  01/03/25  0735 01/03/25  1114 01/05/25  0833 01/05/25  1740 01/06/25  0336 01/06/25  1104   *   < > 139* 119*  --  94   CALCIUM 8.7   < > 8.4* 8.5*  --  8.5*   ALBUMIN 2.4*  --   --  2.6* 2.6*  --    PROT 8.3  --   --  8.8* 8.5*  --    *   < > 134* 134*  --  132*   K 4.0   < > 3.5 3.7  --  3.6   CO2 24   < > 24 27  --  24   CL 99   < > 99 98  --  98   BUN 21*   < > 18 17  --  17   CREATININE 1.2   < > 1.2 1.2  --  1.1   ALKPHOS 153*  --   --  153* 162*  --    ALT 6*  --   --  6* 6*  --    AST 22  --   --  23 27  --    BILITOT 2.0*  --   --  1.7* 1.8*  --     < > = values in this interval not displayed.      Coagulation:   Recent Labs   Lab 01/04/25  0406 01/05/25  0246 01/06/25  0336   INR 2.5* 3.0* 3.2*   APTT 31.2 32.4* 33.7*     LDH:  Recent Labs   Lab 01/04/25  0406 01/05/25  0246 01/06/25  0336    253 273*     Microbiology:  Microbiology Results (last 7 days)       ** No results found for the last 168 hours. **            I have reviewed all pertinent labs within the past 24 hours.    Estimated Creatinine Clearance: 93.6 mL/min (based on SCr of 1.1 mg/dL).    Diagnostic Results:  I have reviewed all pertinent imaging results/findings within the past 24 hours.

## 2025-01-06 NOTE — PROGRESS NOTES
LVAD dressing change completed using sterile technique with daily kit. DLES is a 2 with scant tan drainage noted on the drain sponge. Tolerated without any complication. No redness or tenderness noted.         01/06/25 1547        VAD 06/29/22 1115 Left ventricular assist device HeartMate 3   Placement Date/Time: 06/29/22 1115   Present Prior to Hospital Arrival?: No  Inserted by: MD  VAD Type: Left ventricular assist device  VAD Brand: HeartMate 3   Site Location Abdomen right   Site Assessment Clean;Dry;Intact;Draining;Other (Comment)  (scant drainage)   Driveline Exit Site 2   Driveline Assessment Free of Kinks;Intact;Modular cable Clean and Locked   Dressing Status Clean;Dry;Intact   Dressing Intervention Sterile dressing change   Performed By RN   Dressing Change Schedule Daily   Dressing Change Due 01/07/25   Integrity dry;intact;no damage   Driveline [place] in use   (tegaderm)   Condition CDI   Date changed 01/06/25

## 2025-01-06 NOTE — PLAN OF CARE
Plan of care reviewed with patient. Patient able to verbalize understanding of care. Patient remained free of falls. Patient is resting comfortably in bed, no complainants at this time. Bed in lowest position, call light in reach.   Problem: Ventricular Assist Device  Goal: Optimal Adjustment to Device  Outcome: Progressing  Goal: Absence of Bleeding  Outcome: Progressing  Goal: Absence of Embolism Signs and Symptoms  Outcome: Progressing  Goal: Optimal Blood Flow  Outcome: Progressing  Goal: Absence of Infection Signs and Symptoms  Outcome: Progressing  Goal: Effective Right-Sided Heart Function  Outcome: Progressing     Problem: Adult Inpatient Plan of Care  Goal: Plan of Care Review  Outcome: Progressing  Goal: Patient-Specific Goal (Individualized)  Outcome: Progressing  Goal: Absence of Hospital-Acquired Illness or Injury  Outcome: Progressing  Goal: Optimal Comfort and Wellbeing  Outcome: Progressing  Goal: Readiness for Transition of Care  Outcome: Progressing     Problem: Diabetes Comorbidity  Goal: Blood Glucose Level Within Targeted Range  Outcome: Progressing     Problem: Sepsis/Septic Shock  Goal: Optimal Coping  Outcome: Progressing  Goal: Absence of Bleeding  Outcome: Progressing  Goal: Blood Glucose Level Within Targeted Range  Outcome: Progressing  Goal: Absence of Infection Signs and Symptoms  Outcome: Progressing  Goal: Optimal Nutrition Intake  Outcome: Progressing     Problem: Heart Failure  Goal: Optimal Coping  Outcome: Progressing  Goal: Optimal Cardiac Output  Outcome: Progressing  Goal: Stable Heart Rate and Rhythm  Outcome: Progressing  Goal: Optimal Functional Ability  Outcome: Progressing  Goal: Fluid and Electrolyte Balance  Outcome: Progressing  Goal: Improved Oral Intake  Outcome: Progressing  Goal: Effective Oxygenation and Ventilation  Outcome: Progressing  Goal: Effective Breathing Pattern During Sleep  Outcome: Progressing

## 2025-01-06 NOTE — PROGRESS NOTES
"[facility] - [facility]  Endocrinology  Progress Note    Admit Date: 12/31/2024     Reason for Consult: Management of T2DM, Hyperglycemia     Surgical Procedure and Date: LVAD 06/29/2022      Diabetes diagnosis year: 2022    Lab Results   Component Value Date    HGBA1C 10.3 (H) 08/25/2024       Home Diabetes Medications:  Levemir 20 units twice daily and Novolog 10 or 18 units with meals per patient    How often checking glucose at home? 3-4x day   BG readings on regimen: 150-200s  Hypoglycemia on the regimen?  Yes, at least once weekly; experiences blurred vision  Missed doses on regimen?  Yes, he has been out of Levemir "for a while" since it has been discontinued    Diabetes Complications include:   Hyperglycemia     Complicating diabetes co morbidities:   History of MI, CHF, and CKD      HPI:   Patient is a 39 y.o. 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 He underwent RVAD removal and chest closure 6/30/2022. He presented with one month hx of dyspnea and abdominal distention. He reported bilateral LE edema which prompted him to come to the ED with the assistance of his family as they drove from [place]. He reported intermittent chest pain with coughing. Endo consulted for BG management.            Interval HPI:   No acute events overnight. Patient in room [id number] A. Blood glucose stable. BG at and below goal on current insulin regimen (SSI, prandial, and basal insulin ). Steroid use- None.    Renal function- Normal   Lab Results   Component Value Date    CREATININE 1.2 01/05/2025     Vasopressors-  None     Endocrine will continue to follow and manage insulin orders inpatient.     Diet diabetic Cardiac (Low Na/Chol), Low " "Sodium,2gm; 2000 Calories (up to 75 gm per meal); Fluid - 1500mL     Eating:   <25%  Nausea: No  Hypoglycemia and intervention: No  Fever: No  TPN and/or TF: No  If yes, type of TF/TPN and rate: N/A    BP (!) 72/0 (BP Location: Right arm, Patient Position: Lying)   Pulse 70   Temp 97.8 °F (36.6 °C) (Oral)   Resp 18   Ht 6' 3" (1.905 m)   Wt 73.4 kg (161 lb 13.1 oz)   SpO2 97%   BMI 20.23 kg/m²     Labs Reviewed and Include    Recent Labs   Lab 01/05/25 1740 01/06/25  0336   *  --    CALCIUM 8.5*  --    ALBUMIN 2.6* 2.6*   PROT 8.8* 8.5*   *  --    K 3.7  --    CO2 27  --    CL 98  --    BUN 17  --    CREATININE 1.2  --    ALKPHOS 153* 162*   ALT 6* 6*   AST 23 27   BILITOT 1.7* 1.8*     Lab Results   Component Value Date    WBC 11.55 01/06/2025    HGB 8.3 (L) 01/06/2025    HCT 29.8 (L) 01/06/2025    MCV 64 (L) 01/06/2025     01/06/2025     No results for input(s): "TSH", "FREET4" in the last 168 hours.  Lab Results   Component Value Date    HGBA1C 10.6 (H) 12/31/2024       Nutritional status:   Body mass index is 20.23 kg/m².  Lab Results   Component Value Date    ALBUMIN 2.6 (L) 01/06/2025    ALBUMIN 2.6 (L) 01/05/2025    ALBUMIN 2.4 (L) 01/03/2025     Lab Results   Component Value Date    PREALBUMIN 9 (L) 01/06/2025    PREALBUMIN 8 (L) 01/03/2025    PREALBUMIN 9 (L) 01/01/2025       Estimated Creatinine Clearance: 85.8 mL/min (based on SCr of 1.2 mg/dL).    Accu-Checks  Recent Labs     01/03/25  2109 01/04/25  0842 01/04/25  1254 01/04/25  1740 01/04/25  1950 01/05/25  0849 01/05/25  1304 01/05/25  1657 01/05/25  1942 01/06/25  0821   POCTGLUCOSE 132* 144* 219* 141* 157* 143* 238* 168* 108 127*       Current Medications and/or Treatments Impacting Glycemic Control  Immunotherapy:    Immunosuppressants       None          Steroids:   Hormones (From admission, onward)      None          Pressors:    Autonomic Drugs (From admission, onward)      None          Hyperglycemia/Diabetes " Medications:   Antihyperglycemics (From admission, onward)      Start     Stop Route Frequency Ordered    01/06/25 1130  insulin aspart U-100 pen 6-10 Units         -- SubQ 3 times daily with meals 01/06/25 0901    01/01/25 0900  insulin glargine U-100 (Lantus) pen 10 Units         -- SubQ Daily 01/01/25 0813    12/31/24 1525  insulin aspart U-100 pen 0-10 Units         -- SubQ Before meals & nightly PRN 12/31/24 1426            ASSESSMENT and PLAN    Cardiac/Vascular  * Acute decompensated heart failure  Managed by primary team  Optimize blood glucose control        HTN (hypertension)  Uncontrolled HTN can worsen insulin resistance.         LVAD (left ventricular assist device) present  Managed by primary team      Endocrine  Type 2 diabetes mellitus with hyperglycemia, with long-term current use of insulin  Endocrinology consulted for BG management.   BG goal 140-180    - Lantus (Insulin Glargine) 10 units daily   - Novolog (Insulin Aspart) 6-10 units TIDWM. Administer 6 units if patient eats 25% -  50% and 10 units if patient eats 50% or more (20% reduction due to prandial blood glucose below goal). HOLD if patient is NPO, unable to eat, or if Blood Glucose is less than 100 mg/dL.   - [facility] [place] SSI (150/25)  - BG checks AC/HS  - Hypoglycemia protocol in place    ** Please notify Endocrine for any change and/or advance in diet**  ** Please call Endocrine for any BG related issues **    Discharge Planning:   TBD. Please notify endocrinology prior to discharge.            Guera De Oliveira PA-C  Endocrinology  [facility]

## 2025-01-06 NOTE — PROGRESS NOTES
01/05/2025  Michael Montes    Current provider:  Natalya Villatoro MD    Device interrogation:      1/5/2025     7:43 PM 1/5/2025     3:02 PM 1/5/2025    11:29 AM 1/5/2025     8:03 AM 1/5/2025     4:25 AM 1/4/2025    11:19 PM 1/4/2025     7:36 PM   TXP LVAD INTERROGATIONS   Type HeartMate3 HeartMate3 HeartMate3 HeartMate3 HeartMate3 HeartMate3    Flow 4.1 3.9 3.8 3.9 4 4 3.9   Speed 5100 5100 5100 5100 5100 5100 5100   PI 4.7 5.9 6.3 5.7 5.7 5.6 5.9   Power (Serna) 3.6 3.5 3.6 3.7 3.7 3.6 3.7   LSL 4700 4700 4700 4700 4700 4700 4700   Pulsatility Intermittent pulse Intermittent pulse Intermittent pulse No Pulse Intermittent pulse Intermittent pulse Intermittent pulse          Rounded on Kevan Queen to ensure all mechanical assist device settings (IABP or VAD) were appropriate and all parameters were within limits.  I was able to ensure all back up equipment was present, the staff had no issues, and the Perfusion Department daily rounding was complete.      For implantable VADs: Interrogation of Ventricular assist device was performed with analysis of device parameters and review of device function. I have personally reviewed the interrogation findings and agree with findings as stated.     In emergency, the nursing units have been notified to contact the perfusion department either by:  Calling [phone] from 630am to 4pm Mon thru Fri, utilizing the On-Call Finder functionality of Epic and searching for Perfusion, or by contacting the hospital  from 4pm to 630am and on weekends and asking to speak with the perfusionist on call.    9:22 PM

## 2025-01-06 NOTE — ASSESSMENT & PLAN NOTE
-NICM  -Last 2D Echo 8/26/24: LVEF 5-10%, LVEDD 7.2 cm    -Current diuretic regimen: Furosemide gtt, decreased to rate of 20mg/hr  -GDMT with home dose of Eplerenone.  Will add as tolerated   -Patient is not currently being evaluated for OHTx due to non compliance  -2g Na dietary restriction, 1500 mL fluid restriction, strict I/Os

## 2025-01-06 NOTE — ASSESSMENT & PLAN NOTE
Endocrinology consulted for BG management.   BG goal 140-180    - Lantus (Insulin Glargine) 10 units daily   - Novolog (Insulin Aspart) 6-10 units TIDWM. Administer 6 units if patient eats 25% -  50% and 10 units if patient eats 50% or more (20% reduction due to prandial blood glucose below goal). HOLD if patient is NPO, unable to eat, or if Blood Glucose is less than 100 mg/dL.   [place] [facility] [place]150[place]25[place]  - BG checks AC/HS  - Hypoglycemia protocol in place    ** Please notify Endocrine for any change and/or advance in diet**  ** Please call Endocrine for any BG related issues **    Discharge Planning:   TBD. Please notify endocrinology prior to discharge.       no/pt left out of amb bay

## 2025-01-06 NOTE — ASSESSMENT & PLAN NOTE
-HeartMate 3 Implanted 6/23/2022 with early RV failure requiring RVAD with ProTek Duo   -Continue Coumadin,  Goal INR 2.0-3.0 . Therapeutic today.  Previous home regimen 1.5mg Qdaily  -Antiplatelets ASA 81 mg, on hold with GIB  -LDH is stable overall today. Will continue to monitor daily.  -Speed set at 5100  -Interrogation notable for no events  -Not listed for OHTx      Procedure: Device Interrogation Including analysis of device parameters  Current Settings: Ventricular Assist Device  Review of device function is stable      1/6/2025     3:23 PM 1/6/2025    11:45 AM 1/6/2025     7:58 AM 1/5/2025    11:57 PM 1/5/2025     7:43 PM 1/5/2025     3:02 PM 1/5/2025    11:29 AM   TXP LVAD INTERROGATIONS   Type HeartMate3 HeartMate3 HeartMate3 HeartMate3 HeartMate3 HeartMate3 HeartMate3   Flow 4.1 4 3.9 4.1 4.1 3.9 3.8   Speed 5100 5100 5100 5150 5100 5100 5100   PI 4.4 5.1 6 5.3 4.7 5.9 6.3   Power (Serna) 3.6 3.6 3.5 3.6 3.6 3.5 3.6   LSL 4700 4700 4700 4700 4700 4700 4700   Pulsatility No Pulse Intermittent pulse Intermittent pulse Intermittent pulse Intermittent pulse Intermittent pulse Intermittent pulse

## 2025-01-07 LAB
ANION GAP SERPL CALC-SCNC: 10 MMOL/L (ref 8–16)
ANION GAP SERPL CALC-SCNC: 11 MMOL/L (ref 8–16)
APTT PPP: 33.3 SEC (ref 21–32)
BASOPHILS # BLD AUTO: 0.04 K/UL (ref 0–0.2)
BASOPHILS NFR BLD: 0.4 % (ref 0–1.9)
BUN SERPL-MCNC: 20 MG/DL (ref 6–20)
BUN SERPL-MCNC: 21 MG/DL (ref 6–20)
CALCIUM SERPL-MCNC: 8.5 MG/DL (ref 8.7–10.5)
CALCIUM SERPL-MCNC: 8.6 MG/DL (ref 8.7–10.5)
CHLORIDE SERPL-SCNC: 100 MMOL/L (ref 95–110)
CHLORIDE SERPL-SCNC: 99 MMOL/L (ref 95–110)
CO2 SERPL-SCNC: 24 MMOL/L (ref 23–29)
CO2 SERPL-SCNC: 25 MMOL/L (ref 23–29)
CREAT SERPL-MCNC: 1.2 MG/DL (ref 0.5–1.4)
CREAT SERPL-MCNC: 1.4 MG/DL (ref 0.5–1.4)
DIFFERENTIAL METHOD BLD: ABNORMAL
EOSINOPHIL # BLD AUTO: 0.1 K/UL (ref 0–0.5)
EOSINOPHIL NFR BLD: 0.5 % (ref 0–8)
ERYTHROCYTE [DISTWIDTH] IN BLOOD BY AUTOMATED COUNT: 23.8 % (ref 11.5–14.5)
EST. GFR  (NO RACE VARIABLE): >60 ML/MIN/1.73 M^2
EST. GFR  (NO RACE VARIABLE): >60 ML/MIN/1.73 M^2
GLUCOSE SERPL-MCNC: 144 MG/DL (ref 70–110)
GLUCOSE SERPL-MCNC: 190 MG/DL (ref 70–110)
HCT VFR BLD AUTO: 29.9 % (ref 40–54)
HGB BLD-MCNC: 8.4 G/DL (ref 14–18)
IMM GRANULOCYTES # BLD AUTO: 0.03 K/UL (ref 0–0.04)
IMM GRANULOCYTES NFR BLD AUTO: 0.3 % (ref 0–0.5)
INR PPP: 2.4 (ref 0.8–1.2)
LDH SERPL L TO P-CCNC: 263 U/L (ref 110–260)
LYMPHOCYTES # BLD AUTO: 0.9 K/UL (ref 1–4.8)
LYMPHOCYTES NFR BLD: 9.4 % (ref 18–48)
MAGNESIUM SERPL-MCNC: 1.9 MG/DL (ref 1.6–2.6)
MCH RBC QN AUTO: 17.8 PG (ref 27–31)
MCHC RBC AUTO-ENTMCNC: 28.1 G/DL (ref 32–36)
MCV RBC AUTO: 63 FL (ref 82–98)
MONOCYTES # BLD AUTO: 0.9 K/UL (ref 0.3–1)
MONOCYTES NFR BLD: 9.5 % (ref 4–15)
NEUTROPHILS # BLD AUTO: 7.6 K/UL (ref 1.8–7.7)
NEUTROPHILS NFR BLD: 79.9 % (ref 38–73)
NRBC BLD-RTO: 1 /100 WBC
PHOSPHATE SERPL-MCNC: 3.3 MG/DL (ref 2.7–4.5)
PLATELET # BLD AUTO: 420 K/UL (ref 150–450)
PMV BLD AUTO: 9.4 FL (ref 9.2–12.9)
POCT GLUCOSE: 116 MG/DL (ref 70–110)
POCT GLUCOSE: 147 MG/DL (ref 70–110)
POCT GLUCOSE: 169 MG/DL (ref 70–110)
POCT GLUCOSE: 232 MG/DL (ref 70–110)
POTASSIUM SERPL-SCNC: 3.7 MMOL/L (ref 3.5–5.1)
POTASSIUM SERPL-SCNC: 3.9 MMOL/L (ref 3.5–5.1)
PROTHROMBIN TIME: 24.3 SEC (ref 9–12.5)
RBC # BLD AUTO: 4.72 M/UL (ref 4.6–6.2)
SODIUM SERPL-SCNC: 133 MMOL/L (ref 136–145)
SODIUM SERPL-SCNC: 136 MMOL/L (ref 136–145)
WBC # BLD AUTO: 9.46 K/UL (ref 3.9–12.7)

## 2025-01-07 PROCEDURE — 84100 ASSAY OF PHOSPHORUS: CPT | Performed by: STUDENT IN AN ORGANIZED HEALTH CARE EDUCATION/TRAINING PROGRAM

## 2025-01-07 PROCEDURE — 83615 LACTATE (LD) (LDH) ENZYME: CPT | Performed by: STUDENT IN AN ORGANIZED HEALTH CARE EDUCATION/TRAINING PROGRAM

## 2025-01-07 PROCEDURE — 25000003 PHARM REV CODE 250: Performed by: INTERNAL MEDICINE

## 2025-01-07 PROCEDURE — 21400001 HC TELEMETRY ROOM

## 2025-01-07 PROCEDURE — 25000003 PHARM REV CODE 250: Performed by: STUDENT IN AN ORGANIZED HEALTH CARE EDUCATION/TRAINING PROGRAM

## 2025-01-07 PROCEDURE — 83735 ASSAY OF MAGNESIUM: CPT | Performed by: STUDENT IN AN ORGANIZED HEALTH CARE EDUCATION/TRAINING PROGRAM

## 2025-01-07 PROCEDURE — 36415 COLL VENOUS BLD VENIPUNCTURE: CPT | Performed by: STUDENT IN AN ORGANIZED HEALTH CARE EDUCATION/TRAINING PROGRAM

## 2025-01-07 PROCEDURE — 93750 INTERROGATION VAD IN PERSON: CPT | Mod: ,,, | Performed by: INTERNAL MEDICINE

## 2025-01-07 PROCEDURE — 99233 SBSQ HOSP IP/OBS HIGH 50: CPT | Mod: ,,, | Performed by: INTERNAL MEDICINE

## 2025-01-07 PROCEDURE — 80048 BASIC METABOLIC PNL TOTAL CA: CPT | Performed by: STUDENT IN AN ORGANIZED HEALTH CARE EDUCATION/TRAINING PROGRAM

## 2025-01-07 PROCEDURE — 27000248 HC VAD-ADDITIONAL DAY

## 2025-01-07 PROCEDURE — 85730 THROMBOPLASTIN TIME PARTIAL: CPT | Performed by: STUDENT IN AN ORGANIZED HEALTH CARE EDUCATION/TRAINING PROGRAM

## 2025-01-07 PROCEDURE — 99232 SBSQ HOSP IP/OBS MODERATE 35: CPT | Mod: ,,, | Performed by: NURSE PRACTITIONER

## 2025-01-07 PROCEDURE — 85025 COMPLETE CBC W/AUTO DIFF WBC: CPT | Performed by: STUDENT IN AN ORGANIZED HEALTH CARE EDUCATION/TRAINING PROGRAM

## 2025-01-07 PROCEDURE — 25000003 PHARM REV CODE 250

## 2025-01-07 PROCEDURE — 85610 PROTHROMBIN TIME: CPT | Performed by: STUDENT IN AN ORGANIZED HEALTH CARE EDUCATION/TRAINING PROGRAM

## 2025-01-07 PROCEDURE — 63600175 PHARM REV CODE 636 W HCPCS: Performed by: STUDENT IN AN ORGANIZED HEALTH CARE EDUCATION/TRAINING PROGRAM

## 2025-01-07 RX ORDER — LANOLIN ALCOHOL/MO/W.PET/CERES
400 CREAM (GRAM) TOPICAL ONCE
Status: COMPLETED | OUTPATIENT
Start: 2025-01-07 | End: 2025-01-07

## 2025-01-07 RX ORDER — FUROSEMIDE 10 MG/ML
80 INJECTION INTRAMUSCULAR; INTRAVENOUS 3 TIMES DAILY
Status: DISCONTINUED | OUTPATIENT
Start: 2025-01-07 | End: 2025-01-07

## 2025-01-07 RX ORDER — FUROSEMIDE 10 MG/ML
80 INJECTION INTRAMUSCULAR; INTRAVENOUS
Status: DISCONTINUED | OUTPATIENT
Start: 2025-01-07 | End: 2025-01-09 | Stop reason: HOSPADM

## 2025-01-07 RX ADMIN — ASPIRIN 81 MG: 81 TABLET, COATED ORAL at 08:01

## 2025-01-07 RX ADMIN — Medication 400 MG: at 08:01

## 2025-01-07 RX ADMIN — FUROSEMIDE 80 MG: 10 INJECTION, SOLUTION INTRAVENOUS at 08:01

## 2025-01-07 RX ADMIN — POTASSIUM CHLORIDE 40 MEQ: 1500 TABLET, EXTENDED RELEASE ORAL at 08:01

## 2025-01-07 RX ADMIN — SENNOSIDES AND DOCUSATE SODIUM 1 TABLET: 50; 8.6 TABLET ORAL at 08:01

## 2025-01-07 RX ADMIN — INSULIN ASPART 10 UNITS: 100 INJECTION, SOLUTION INTRAVENOUS; SUBCUTANEOUS at 06:01

## 2025-01-07 RX ADMIN — INSULIN ASPART 10 UNITS: 100 INJECTION, SOLUTION INTRAVENOUS; SUBCUTANEOUS at 01:01

## 2025-01-07 RX ADMIN — FUROSEMIDE 80 MG: 10 INJECTION, SOLUTION INTRAVENOUS at 01:01

## 2025-01-07 RX ADMIN — GABAPENTIN 400 MG: 400 CAPSULE ORAL at 08:01

## 2025-01-07 RX ADMIN — CEFADROXIL 500 MG: 500 CAPSULE ORAL at 08:01

## 2025-01-07 RX ADMIN — WARFARIN SODIUM 1.5 MG: 1 TABLET ORAL at 05:01

## 2025-01-07 RX ADMIN — ACETAMINOPHEN 650 MG: 325 TABLET ORAL at 03:01

## 2025-01-07 RX ADMIN — EMPAGLIFLOZIN 10 MG: 10 TABLET, FILM COATED ORAL at 11:01

## 2025-01-07 RX ADMIN — INSULIN ASPART 2 UNITS: 100 INJECTION, SOLUTION INTRAVENOUS; SUBCUTANEOUS at 08:01

## 2025-01-07 RX ADMIN — ACETAMINOPHEN 650 MG: 325 TABLET ORAL at 01:01

## 2025-01-07 RX ADMIN — AMLODIPINE BESYLATE 10 MG: 10 TABLET ORAL at 08:01

## 2025-01-07 RX ADMIN — EPLERENONE 50 MG: 25 TABLET, FILM COATED ORAL at 08:01

## 2025-01-07 RX ADMIN — LEVETIRACETAM 1500 MG: 500 TABLET, FILM COATED ORAL at 08:01

## 2025-01-07 RX ADMIN — GABAPENTIN 100 MG: 100 CAPSULE ORAL at 08:01

## 2025-01-07 RX ADMIN — DULOXETINE HYDROCHLORIDE 30 MG: 30 CAPSULE, DELAYED RELEASE ORAL at 08:01

## 2025-01-07 RX ADMIN — ATORVASTATIN CALCIUM 40 MG: 40 TABLET, FILM COATED ORAL at 08:01

## 2025-01-07 RX ADMIN — INSULIN GLARGINE 10 UNITS: 100 INJECTION, SOLUTION SUBCUTANEOUS at 08:01

## 2025-01-07 RX ADMIN — AMIODARONE HYDROCHLORIDE 200 MG: 200 TABLET ORAL at 08:01

## 2025-01-07 RX ADMIN — INSULIN ASPART 10 UNITS: 100 INJECTION, SOLUTION INTRAVENOUS; SUBCUTANEOUS at 09:01

## 2025-01-07 NOTE — ASSESSMENT & PLAN NOTE
Endocrinology consulted for BG management.   BG goal 140-180    - Lantus (Insulin Glargine) 10 units daily   - Novolog (Insulin Aspart) 6-10 units TIDWM. Administer 6 units if patient eats 25% -  50% and 10 units if patient eats 50% or more (20% reduction due to prandial blood glucose below goal). HOLD if patient is NPO, unable to eat, or if Blood Glucose is less than 100 mg/dL.   - [facility] [place]150/25)  - BG checks AC/HS  - Hypoglycemia protocol in place    ** Please notify Endocrine for any change and/or advance in diet**  ** Please call Endocrine for any BG related issues **    Discharge Planning:   TBD. Please notify endocrinology prior to discharge.

## 2025-01-07 NOTE — ASSESSMENT & PLAN NOTE
-NICM  -Last 2D Echo 8/26/24: LVEF 5-10%, LVEDD 7.2 cm    -Current diuretic regimen: Laisx IVP 80 TID from lasix gtt 20/hr  -GDMT with home dose of Eplerenone.  Will add as tolerated   -Patient is not currently being evaluated for OHTx due to non compliance  -2g Na dietary restriction, 1500 mL fluid restriction, strict I/Os

## 2025-01-07 NOTE — PATIENT CARE CONFERENCE
Visited with patient he had equipment maintenance due. Patients emergency equipment with him no visible damage. All of patients 14v batteries are due to be exchanged. Replaced all of those. Patient has no other maintenance due.             Old 14v BAtteries  1.[id number] 4/2025   2.[id number] 4/2025   3.[id number] 4/2025   4.[id number] 4/2025   5.[id number] 4/2025   6.[id number] 4/2025   7.[id number] 4/2025   8.[id number] 4/2025     New 14v Batteries:  1.[id number] 7/2028   2.QG638516 7/2028   3.[id number] 7/2028   4.[id number] 7/2028   5.[id number] 7/2028   6.[id number] 7/2028   7.FM727730 7/2028   8.[id number] 7/2028

## 2025-01-07 NOTE — NURSING
LVAD dressing change completed using sterile technique with daily kit. DLES is a 2 with minimal dark tan drainage noted on the drain sponge. Tolerated without any complication. No redness, or tenderness noted.

## 2025-01-07 NOTE — PLAN OF CARE
Plan of care discussed with patient.  Patient ambulating independently, fall precautions in place. LVAD DP and numbers WNL, smooth LVAD hum. DLES sterile dressing changed performed today. Lasix gtt discontinued, IVP lasix given per order. Patient complained of pain, treated with prn pain medication. Discussed medications and care. Patient has no questions at this time.         Problem: Pain Acute  Goal: Optimal Pain Control and Function  1/7/2025 1712 by Evy Chappell RN  Outcome: Progressing  1/7/2025 1712 by Evy Chappell RN  Outcome: Progressing  Intervention: Develop Pain Management Plan  Flowsheets (Taken 1/7/2025 1712)  Pain Management Interventions:   care clustered   medication offered  Intervention: Prevent or Manage Pain  Flowsheets (Taken 1/7/2025 1712)  Sleep/Rest Enhancement:   awakenings minimized   regular sleep/rest pattern promoted   relaxation techniques promoted  Sensory Stimulation Regulation:   television on   care clustered   quiet environment promoted  Bowel Elimination Promotion: ambulation promoted  Medication Review/Management: medications reviewed  Intervention: Optimize Psychosocial Wellbeing  Flowsheets (Taken 1/7/2025 1712)  Supportive Measures:   active listening utilized   verbalization of feelings encouraged   relaxation techniques promoted   self-care encouraged  Diversional Activities:   smartphone   television     Problem: Ventricular Assist Device  Goal: Optimal Adjustment to Device  Outcome: Progressing  Intervention: Optimize Psychosocial Response to VAD  Flowsheets (Taken 1/7/2025 1712)  Family/Support System Care:   presence promoted   involvement promoted  Goal: Absence of Bleeding  Outcome: Progressing  Intervention: Monitor and Manage Bleeding  Flowsheets (Taken 1/7/2025 1712)  Bleeding Precautions:   blood pressure closely monitored   coagulation study results reviewed  Bleeding Management: dressing monitored  Goal: Absence of Embolism Signs and Symptoms  Outcome:  Progressing  Intervention: Prevent or Manage Embolism  Flowsheets (Taken 1/7/2025 1712)  VTE Prevention/Management:   ambulation promoted   bleeding precautions maintained   bleeding risk assessed   dorsiflexion/plantar flexion performed   ROM (active) performed  Goal: Optimal Blood Flow  Outcome: Progressing  Intervention: Optimize Blood Flow  Flowsheets (Taken 1/7/2025 1712)  Dysrhythmia Management: (cardiac monitoring continued) other (see comments)  Goal: Absence of Infection Signs and Symptoms  Outcome: Progressing  Intervention: Prevent or Manage Infection  Flowsheets (Taken 1/7/2025 1712)  Infection Prevention:   hand hygiene promoted   equipment surfaces disinfected   rest/sleep promoted   single patient room provided  Goal: Effective Right-Sided Heart Function  Outcome: Progressing

## 2025-01-07 NOTE — NURSING
Notified MD Ovalles of Doppler of 92/0 , checked cuff pressure and map was 92. Pt is asystematic.Md ordered to recheck bp at at 2200.         @2225 notified MD of doppler at 92/0 asymmetric. Will continue to monitor dopplers.

## 2025-01-07 NOTE — SUBJECTIVE & OBJECTIVE
"Interval HPI:   Overnight events: No acute events overnight. Patient in room [id number] A. Blood glucose stable. BG at goal on current insulin regimen (SSI, prandial, and basal insulin ). Steroid use- None.    Renal function- Normal   Vasopressors-  None       Endocrine will continue to follow and manage insulin orders inpatient.         Diet diabetic Cardiac (Low Na/Chol), Low Sodium,2gm; 2000 Calories (up to 75 gm per meal); Fluid - 1500mL     Eatin%  Nausea: No  Hypoglycemia and intervention: No  Fever: No  TPN and/or TF: No      BP (!) 72/0 (BP Location: Right arm, Patient Position: Lying)   Pulse 86   Temp 97.8 °F (36.6 °C) (Oral)   Resp 16   Ht 6' 3" (1.905 m)   Wt 73.4 kg (161 lb 13.1 oz)   SpO2 98%   BMI 20.23 kg/m²     Labs Reviewed and Include    Recent Labs   Lab 25  0233   *   CALCIUM 8.6*   *   K 3.9   CO2 24   CL 99   BUN 21*   CREATININE 1.2     Lab Results   Component Value Date    WBC 9.46 2025    HGB 8.4 (L) 2025    HCT 29.9 (L) 2025    MCV 63 (L) 2025     2025     No results for input(s): "TSH", "FREET4" in the last 168 hours.  Lab Results   Component Value Date    HGBA1C 10.6 (H) 2024       Nutritional status:   Body mass index is 20.23 kg/m².  Lab Results   Component Value Date    ALBUMIN 2.6 (L) 2025    ALBUMIN 2.6 (L) 2025    ALBUMIN 2.4 (L) 2025     Lab Results   Component Value Date    PREALBUMIN 9 (L) 2025    PREALBUMIN 8 (L) 2025    PREALBUMIN 9 (L) 2025       Estimated Creatinine Clearance: 85.8 mL/min (based on SCr of 1.2 mg/dL).    Accu-Checks  Recent Labs     25  0849 25  1304 25  1657 25  19425  0821 25  1221 25  1635 25  1921 25  0821 25  1130   POCTGLUCOSE 143* 238* 168* 108 127* 117* 99 239* 147* 169*       Current Medications and/or Treatments Impacting Glycemic Control  Immunotherapy:  "   Immunosuppressants       None          Steroids:   Hormones (From admission, onward)      None          Pressors:    Autonomic Drugs (From admission, onward)      None          Hyperglycemia/Diabetes Medications:   Antihyperglycemics (From admission, onward)      Start     Stop Route Frequency Ordered    01/07/25 1200  empagliflozin (Jardiance) tablet 10 mg        Question Answer Comment   Does this patient have a diagnosis of heart failure? Yes    Does this patient have type 1 diabetes or diabetic ketoacidosis? No    Does this patient have symptomatic hypotension? No    Is the patient NPO or pending major surgery in next 3 days or less? No        -- Oral Daily 01/07/25 1054    01/06/25 1130  insulin aspart U-100 pen 6-10 Units         -- SubQ 3 times daily with meals 01/06/25 0901    01/01/25 0900  insulin glargine U-100 (Lantus) pen 10 Units         -- SubQ Daily 01/01/25 0813    12/31/24 1525  insulin aspart U-100 pen 0-10 Units         -- SubQ Before meals & nightly PRN 12/31/24 1423

## 2025-01-07 NOTE — ASSESSMENT & PLAN NOTE
-HeartMate 3 Implanted 6/23/2022 with early RV failure requiring RVAD with ProTek Duo   -Continue Coumadin,  Goal INR 2.0-3.0 . Therapeutic today.  Previous home regimen 1.5mg Qdaily  -Antiplatelets ASA 81 mg, on hold with GIB  -LDH is stable overall today. Will continue to monitor daily.  -Speed set at 5100  -Interrogation notable for no events  -Not listed for OHTx      Procedure: Device Interrogation Including analysis of device parameters  Current Settings: Ventricular Assist Device  Review of device function is stable      1/7/2025    11:36 AM 1/7/2025     8:10 AM 1/7/2025     4:30 AM 1/6/2025    11:57 PM 1/6/2025     7:32 PM 1/6/2025     3:23 PM 1/6/2025    11:45 AM   TXP LVAD INTERROGATIONS   Type HeartMate3 HeartMate3 HeartMate3 HeartMate3 HeartMate3 HeartMate3 HeartMate3   Flow 4.1 3.8 3.8 4 4.2 4.1 4   Speed 5100 5100 5100 5100 5100 5100 5100   PI 4.7 5.9 5.9 5.2 4.6 4.4 5.1   Power (Serna) 3.6 3.6 3.6 3.6 3.6 3.6 3.6   LSL 4700 4700 4700 4700 4700 4700 4700   Pulsatility Intermittent pulse No Pulse No Pulse No Pulse No Pulse No Pulse Intermittent pulse

## 2025-01-07 NOTE — PLAN OF CARE
Plan of care discussed with patient.  Patient ambulating independently, fall precautions in place. LVAD DP and numbers WNL, smooth LVAD hum. Patient has no complaints of pain. Patient complained of anxiety, treated with prn anxiety medications. Lasix gtt infusing per orders. Discussed medications and care. Patient has no questions at this time.        Problem: Ventricular Assist Device  Goal: Absence of Infection Signs and Symptoms  Outcome: Progressing  Intervention: Prevent or Manage Infection  Flowsheets (Taken 1/6/2025 1824)  Infection Prevention:   equipment surfaces disinfected   hand hygiene promoted   personal protective equipment utilized   rest/sleep promoted   single patient room provided  Driveline Securement: dressing     Problem: Adult Inpatient Plan of Care  Goal: Optimal Comfort and Wellbeing  Outcome: Progressing  Intervention: Provide Person-Centered Care  Flowsheets (Taken 1/6/2025 1824)  Trust Relationship/Rapport:   care explained   choices provided   questions encouraged   questions answered     Problem: Diabetes Comorbidity  Goal: Blood Glucose Level Within Targeted Range  Outcome: Progressing  Intervention: Monitor and Manage Glycemia  Flowsheets (Taken 1/6/2025 1824)  Glycemic Management:   blood glucose monitored   supplemental insulin given     Problem: Sepsis/Septic Shock  Goal: Absence of Bleeding  Outcome: Progressing  Intervention: Monitor and Manage Bleeding  Flowsheets (Taken 1/6/2025 1824)  Bleeding Precautions:   coagulation study results reviewed   blood pressure closely monitored  Bleeding Management: dressing monitored     Problem: Fall Injury Risk  Goal: Absence of Fall and Fall-Related Injury  Outcome: Progressing  Intervention: Identify and Manage Contributors  Flowsheets (Taken 1/6/2025 1824)  Self-Care Promotion:   independence encouraged   BADL personal objects within reach  Medication Review/Management: medications reviewed  Intervention: Promote Injury-Free  Environment  Flowsheets (Taken 1/6/2025 1824)  Safety Promotion/Fall Prevention:   assistive device/personal item within reach   commode/urinal/bedpan at bedside   Fall Risk reviewed with patient/family   Fall Risk signage in place   medications reviewed   instructed to call staff for mobility   nonskid shoes/socks when out of bed   patient expresses understanding of fall risk and prevention   room near unit station     Problem: Heart Failure  Goal: Stable Heart Rate and Rhythm  Outcome: Progressing  Intervention: Monitor and Manage Cardiac Rhythm Effect  Flowsheets (Taken 1/6/2025 1824)  Dysrhythmia Management: (cardiac monitoring continued) other (see comments)  Goal: Optimal Functional Ability  Outcome: Progressing  Intervention: Optimize Functional Ability  Flowsheets (Taken 1/6/2025 1824)  Self-Care Promotion:   independence encouraged   BADL personal objects within reach  Activity Management: Ambulated -L4

## 2025-01-07 NOTE — PROGRESS NOTES
01/07/2025  John Alaniz    Current provider:  Dalia Crum MD    Device interrogation:      1/7/2025    11:36 AM 1/7/2025     8:10 AM 1/7/2025     4:30 AM 1/6/2025    11:57 PM 1/6/2025     7:32 PM 1/6/2025     3:23 PM 1/6/2025    11:45 AM   TXP LVAD INTERROGATIONS   Type HeartMate3 HeartMate3 HeartMate3 HeartMate3 HeartMate3 HeartMate3 HeartMate3   Flow 4.1 3.8 3.8 4 4.2 4.1 4   Speed 5100 5100 5100 5100 5100 5100 5100   PI 4.7 5.9 5.9 5.2 4.6 4.4 5.1   Power (Serna) 3.6 3.6 3.6 3.6 3.6 3.6 3.6   LSL 4700 4700 4700 4700 4700 4700 4700   Pulsatility Intermittent pulse No Pulse No Pulse No Pulse No Pulse No Pulse Intermittent pulse          Rounded on Kevan Queen to ensure all mechanical assist device settings (IABP or VAD) were appropriate and all parameters were within limits.  I was able to ensure all back up equipment was present, the staff had no issues, and the Perfusion Department daily rounding was complete.      For implantable VADs: Interrogation of Ventricular assist device was performed with analysis of device parameters and review of device function. I have personally reviewed the interrogation findings and agree with findings as stated.     In emergency, the nursing units have been notified to contact the perfusion department either by:  Calling [id number] from 630am to 4pm Mon thru Fri, utilizing the On-Call Finder functionality of Epic and searching for Perfusion, or by contacting the hospital  from 4pm to 630am and on weekends and asking to speak with the perfusionist on call.    2:33 PM

## 2025-01-07 NOTE — PROGRESS NOTES
[facility]  Heart Transplant  Progress Note    Patient Name: Kevan Queen  MRN: [id number]  Admission Date: 12/31/2024  Hospital Length of Stay: 7 days  Attending Physician: Dalia Crum MD  Primary Care Provider: Rosio Armendariz FNP  Principal Problem:Acute decompensated heart failure    Subjective:   Interval History: No overnight events. Net negative 844 mL in last 24 hours. Will stop lasix gtt to IVP 80 TID - Cont to monitor BID lytes.    Anxiety better.     Continuous Infusions:   furosemide (Lasix) 500 mg in 50 mL infusion (conc: 10 mg/mL)  20 mg/hr Intravenous Continuous 2 mL/hr at 01/06/25 2144 20 mg/hr at 01/06/25 2144     Scheduled Meds:   amiodarone  200 mg Oral Daily    amLODIPine  10 mg Oral Daily    aspirin  81 mg Oral Daily    atorvastatin  40 mg Oral Daily    cefadroxil  500 mg Oral Q12H    DULoxetine  30 mg Oral Daily    empagliflozin  10 mg Oral Daily    eplerenone  50 mg Oral Daily    gabapentin  100 mg Oral Daily    And    gabapentin  400 mg Oral QHS    insulin aspart U-100  6-10 Units Subcutaneous TIDWM    insulin glargine U-100  10 Units Subcutaneous Daily    lactulose  10 g Oral TID    levETIRAcetam  1,500 mg Oral BID    mupirocin   Nasal BID    polyethylene glycol  17 g Oral Daily    potassium chloride  40 mEq Oral BID    senna-docusate 8.6-50 mg  1 tablet Oral Daily    warfarin  1 mg Oral Every Mon, Fri    And    warfarin  1.5 mg Oral Once per day on Sunday Tuesday Wednesday Thursday Saturday     PRN Meds:  Current Facility-Administered Medications:     acetaminophen, 650 mg, Oral, Q6H PRN    dextrose 50%, 12.5 g, Intravenous, PRN    dextrose 50%, 25 g, Intravenous, PRN    glucagon (human recombinant), 1 mg, Intramuscular, PRN    glucose, 16 g, Oral, PRN    glucose, 24 g, Oral, PRN    hydrOXYzine HCL, 50 mg, Oral, TID PRN    influenza, 0.5 mL, Intramuscular, vaccine x 1 dose    insulin aspart U-100, 0-10 Units, Subcutaneous, QID (AC + HS) PRN    Review of  patient's allergies indicates:   Allergen Reactions    Bumex [bumetanide] Hives     Objective:     Vital Signs (Most Recent):  Temp: 97.8 °F (36.6 °C) (01/07/25 1129)  Pulse: 86 (01/07/25 1129)  Resp: 16 (01/07/25 1129)  BP: (!) 72/0 (01/07/25 1140)  SpO2: 98 % (01/07/25 0753) Vital Signs (24h Range):  Temp:  [97.6 °F (36.4 °C)-98.4 °F (36.9 °C)] 97.8 °F (36.6 °C)  Pulse:  [77-91] 86  Resp:  [16-18] 16  SpO2:  [94 %-99 %] 98 %  BP: ()/(0-104) 72/0     Patient Vitals for the past 72 hrs (Last 3 readings):   Weight   01/06/25 0422 73.4 kg (161 lb 13.1 oz)   01/05/25 0437 75.9 kg (167 lb 5.3 oz)     Body mass index is 20.23 kg/m².      Intake/Output Summary (Last 24 hours) at 1/7/2025 1240  Last data filed at 1/7/2025 1202  Gross per 24 hour   Intake 1855.55 ml   Output 1750 ml   Net 105.55 ml       Hemodynamic Parameters:       Telemetry: reviewed        Physical Exam  Vitals and nursing note reviewed.   Constitutional:       Appearance: Normal appearance.   HENT:      Head: Normocephalic.      Mouth/Throat:      Mouth: Mucous membranes are moist.   Eyes:      Pupils: Pupils are equal, round, and reactive to light.   Neck:      Comments: JVP elevation to mid-upper neck   Cardiovascular:      Comments: Smooth VAD hum   Pulmonary:      Effort: Pulmonary effort is normal.   Abdominal:      General: Bowel sounds are normal. There is no distension.      Palpations: Abdomen is soft.   Musculoskeletal:         General: Normal range of motion.      Right lower leg: No edema.      Left lower leg: No edema.   Skin:     General: Skin is warm.      Capillary Refill: Capillary refill takes 2 to 3 seconds.   Neurological:      General: No focal deficit present.      Mental Status: He is alert and oriented to person, place, and time.   Psychiatric:         Mood and Affect: Mood normal.         Behavior: Behavior normal.            Significant Labs:  CBC:  Recent Labs   Lab 01/05/25  0246 01/06/25  0336 01/07/25  0233   WBC  "10.89 11.55 9.46   RBC 4.33* 4.66 4.72   HGB 8.0* 8.3* 8.4*   HCT 27.3* 29.8* 29.9*    434 420   MCV 63* 64* 63*   MCH 18.5* 17.8* 17.8*   MCHC 29.3* 27.9* 28.1*     BNP:  No results for input(s): "BNP" in the last 168 hours.    Invalid input(s): "BNPTRIAGELBLO"    CMP:  Recent Labs   Lab 01/03/25  0735 01/03/25  1114 01/05/25  1740 01/06/25 0336 01/06/25  1104 01/06/25 2047 01/07/25 0233   *   < > 119*  --  94 118* 190*   CALCIUM 8.7   < > 8.5*  --  8.5* 8.5* 8.6*   ALBUMIN 2.4*  --  2.6* 2.6*  --   --   --    PROT 8.3  --  8.8* 8.5*  --   --   --    *   < > 134*  --  132* 132* 133*   K 4.0   < > 3.7  --  3.6 3.5 3.9   CO2 24   < > 27  --  24 26 24   CL 99   < > 98  --  98 98 99   BUN 21*   < > 17  --  17 18 21*   CREATININE 1.2   < > 1.2  --  1.1 1.2 1.2   ALKPHOS 153*  --  153* 162*  --   --   --    ALT 6*  --  6* 6*  --   --   --    AST 22  --  23 27  --   --   --    BILITOT 2.0*  --  1.7* 1.8*  --   --   --     < > = values in this interval not displayed.      Coagulation:   Recent Labs   Lab 01/05/25 0246 01/06/25 0336 01/07/25 0233   INR 3.0* 3.2* 2.4*   APTT 32.4* 33.7* 33.3*     LDH:  Recent Labs   Lab 01/05/25 0246 01/06/25 0336 01/07/25 0233    273* 263*     Microbiology:  Microbiology Results (last 7 days)       ** No results found for the last 168 hours. **            I have reviewed all pertinent labs within the past 24 hours.    Estimated Creatinine Clearance: 85.8 mL/min (based on SCr of 1.2 mg/dL).    Diagnostic Results:  I have reviewed all pertinent imaging results/findings within the past 24 hours.  Assessment and Plan:     Kevan Queen is a 39 year old male with stage D 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 He " underwent RVAD removal and chest closure 6/30/2022. He is off inotropes. He presents with one month hx of dyspnea and abdominal distention. Stated he takes lasix 80mg daily. He develops rash to both torsemide and bumex. He reports yesterday he noticed bilateral LE edema which prompted him to come to the ED with the assistance of his family as they drove from [place]. He reports intermittent chest pain with coughing but not occurring at the moment. He denies any LVAD alarms. He reports eating salty foods yesterday.      On 11/15/23 underwent removal of eroded [employer] scICD--no Lifevest (due to LVAD) and no plan to replace ICD as not requiring therapy post LVAD with concern for reinfection.      Of note, he presented to [facility] ER  on 12/8 for dyspnea and hyperglycemia, felt better so was discharged.     Upon arrival to the ER today, he was hemodynamically stable. Labs demonstrated (hyponatremia Na 131), asiya Ca 9.5, Hgb 7.9 (at baseline), . He was given lasix IV 60mg and made about 300cc of UOP. He is admitted to HTS team for further management.          * Acute decompensated heart failure  -NICM  -Last 2D Echo 8/26/24: LVEF 5-10%, LVEDD 7.2 cm    -Current diuretic regimen: Laisx IVP 80 TID from lasix gtt 20/hr  -GDMT with home dose of Eplerenone.  Will add as tolerated   -Patient is not currently being evaluated for OHTx due to non compliance  -2g Na dietary restriction, 1500 mL fluid restriction, strict I/Os      LVAD (left ventricular assist device) present  -HeartMate 3 Implanted 6/23/2022 with early RV failure requiring RVAD with ProTek Duo   -Continue Coumadin,  Goal INR 2.0-3.0 . Therapeutic today.  Previous home regimen 1.5mg Qdaily  -Antiplatelets ASA 81 mg, on hold with GIB  -LDH is stable overall today. Will continue to monitor daily.  -Speed set at 5100  -Interrogation notable for no events  -Not listed for OHTx      Procedure: Device Interrogation Including analysis of device  parameters  Current Settings: Ventricular Assist Device  Review of device function is stable      1/7/2025    11:36 AM 1/7/2025     8:10 AM 1/7/2025     4:30 AM 1/6/2025    11:57 PM 1/6/2025     7:32 PM 1/6/2025     3:23 PM 1/6/2025    11:45 AM   TXP LVAD INTERROGATIONS   Type HeartMate3 HeartMate3 HeartMate3 HeartMate3 HeartMate3 HeartMate3 HeartMate3   Flow 4.1 3.8 3.8 4 4.2 4.1 4   Speed 5100 5100 5100 5100 5100 5100 5100   PI 4.7 5.9 5.9 5.2 4.6 4.4 5.1   Power (Serna) 3.6 3.6 3.6 3.6 3.6 3.6 3.6   LSL 4700 4700 4700 4700 4700 4700 4700   Pulsatility Intermittent pulse No Pulse No Pulse No Pulse No Pulse No Pulse Intermittent pulse         Atrial flutter  Continue home amiodarone and anticoagulation.    Type 2 diabetes mellitus with hyperglycemia, with long-term current use of insulin  A1c 10.   Resume insulin  Endocrinology consulted for BG management    HTN (hypertension)  Resume home antihypertensive - amlodipine and epleronone.    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     Tobacco use  Active tobacco user.   Patient declined nicotine patch.    Anemia of chronic disease  Hgb 7.9 at baseline. Will order iron panel.     Anxiety  Resume duloxetine.  Hydroxyzine PRN  One time xanax on 1/6        Enrique Vazquez MD  Heart Transplant  [facility]

## 2025-01-07 NOTE — PROGRESS NOTES
"[facility]  Endocrinology  Progress Note    Admit Date: 12/31/2024     Reason for Consult: Management of T2DM, Hyperglycemia     Surgical Procedure and Date: LVAD 06/29/2022      Diabetes diagnosis year: 2022    Lab Results   Component Value Date    HGBA1C 10.3 (H) 08/25/2024       Home Diabetes Medications:  Levemir 20 units twice daily and Novolog 10 or 18 units with meals per patient    How often checking glucose at home? 3-4x day   BG readings on regimen: 150-200s  Hypoglycemia on the regimen?  Yes, at least once weekly; experiences blurred vision  Missed doses on regimen?  Yes, he has been out of Levemir "for a while" since it has been discontinued    Diabetes Complications include:   Hyperglycemia     Complicating diabetes co morbidities:   History of MI, CHF, and CKD      HPI:   Patient is a 39 y.o. 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 He underwent RVAD removal and chest closure 6/30/2022. He presented with one month hx of dyspnea and abdominal distention. He reported bilateral LE edema which prompted him to come to the ED with the assistance of his family as they drove from [place]. He reported intermittent chest pain with coughing. Endo consulted for BG management.            Interval HPI:   Overnight events: No acute events overnight. Patient in room [id number] A. Blood glucose stable. BG at goal on current insulin regimen (SSI, prandial, and basal insulin ). Steroid use- None.    Renal function- Normal   Vasopressors-  None       Endocrine will continue to follow and manage insulin orders inpatient.         Diet diabetic Cardiac (Low Na/Chol), Low Sodium,2gm; 2000 Calories (up to 75 gm per meal); Fluid - 1500mL " "    Eatin%  Nausea: No  Hypoglycemia and intervention: No  Fever: No  TPN and/or TF: No      BP (!) 72/0 (BP Location: Right arm, Patient Position: Lying)   Pulse 86   Temp 97.8 °F (36.6 °C) (Oral)   Resp 16   Ht 6' 3" (1.905 m)   Wt 73.4 kg (161 lb 13.1 oz)   SpO2 98%   BMI 20.23 kg/m²     Labs Reviewed and Include    Recent Labs   Lab 25  0233   *   CALCIUM 8.6*   *   K 3.9   CO2 24   CL 99   BUN 21*   CREATININE 1.2     Lab Results   Component Value Date    WBC 9.46 2025    HGB 8.4 (L) 2025    HCT 29.9 (L) 2025    MCV 63 (L) 2025     2025     No results for input(s): "TSH", "FREET4" in the last 168 hours.  Lab Results   Component Value Date    HGBA1C 10.6 (H) 2024       Nutritional status:   Body mass index is 20.23 kg/m².  Lab Results   Component Value Date    ALBUMIN 2.6 (L) 2025    ALBUMIN 2.6 (L) 2025    ALBUMIN 2.4 (L) 2025     Lab Results   Component Value Date    PREALBUMIN 9 (L) 2025    PREALBUMIN 8 (L) 2025    PREALBUMIN 9 (L) 2025       Estimated Creatinine Clearance: 85.8 mL/min (based on SCr of 1.2 mg/dL).    Accu-Checks  Recent Labs     25  0849 25  1304 25  1657 25  1942 25  0821 25  1221 25  1635 25  1921 25  0821 25  1130   POCTGLUCOSE 143* 238* 168* 108 127* 117* 99 239* 147* 169*       Current Medications and/or Treatments Impacting Glycemic Control  Immunotherapy:    Immunosuppressants       None          Steroids:   Hormones (From admission, onward)      None          Pressors:    Autonomic Drugs (From admission, onward)      None          Hyperglycemia/Diabetes Medications:   Antihyperglycemics (From admission, onward)      Start     Stop Route Frequency Ordered    25 1200  empagliflozin (Jardiance) tablet 10 mg        Question Answer Comment   Does this patient have a diagnosis of heart failure? Yes    Does this " patient have type 1 diabetes or diabetic ketoacidosis? No    Does this patient have symptomatic hypotension? No    Is the patient NPO or pending major surgery in next 3 days or less? No        -- Oral Daily 01/07/25 1054    01/06/25 1130  insulin aspart U-100 pen 6-10 Units         -- SubQ 3 times daily with meals 01/06/25 0901    01/01/25 0900  insulin glargine U-100 (Lantus) pen 10 Units         -- SubQ Daily 01/01/25 0813    12/31/24 1525  insulin aspart U-100 pen 0-10 Units         -- SubQ Before meals & nightly PRN 12/31/24 1426            ASSESSMENT and PLAN    Cardiac/Vascular  * Acute decompensated heart failure  Managed by primary team  Optimize blood glucose control        LVAD (left ventricular assist device) present  Managed by primary team      Endocrine  Type 2 diabetes mellitus with hyperglycemia, with long-term current use of insulin  Endocrinology consulted for BG management.   BG goal 140-180    - Lantus (Insulin Glargine) 10 units daily   - Novolog (Insulin Aspart) 6-10 units TIDWM. Administer 6 units if patient eats 25% -  50% and 10 units if patient eats 50% or more  HOLD if patient is NPO, unable to eat, or if Blood Glucose is less than 100 mg/dL.   - [facility] [place]150[place]25[place]  - BG checks AC/HS  - Hypoglycemia protocol in place    ** Please notify Endocrine for any change and/or advance in diet**  ** Please call Endocrine for any BG related issues **    Discharge Planning:   TBD. Please notify endocrinology prior to discharge.             Michael Wyman, DNP, FNP  Endocrinology  [facility]

## 2025-01-07 NOTE — SUBJECTIVE & OBJECTIVE
Interval History: No overnight events. Net negative 844 mL in last 24 hours. Will stop lasix gtt to IVP 80 TID - Cont to monitor BID lytes.    Anxiety better.     Continuous Infusions:   furosemide (Lasix) 500 mg in 50 mL infusion (conc: 10 mg/mL)  20 mg/hr Intravenous Continuous 2 mL/hr at 01/06/25 2144 20 mg/hr at 01/06/25 2144     Scheduled Meds:   amiodarone  200 mg Oral Daily    amLODIPine  10 mg Oral Daily    aspirin  81 mg Oral Daily    atorvastatin  40 mg Oral Daily    cefadroxil  500 mg Oral Q12H    DULoxetine  30 mg Oral Daily    empagliflozin  10 mg Oral Daily    eplerenone  50 mg Oral Daily    gabapentin  100 mg Oral Daily    And    gabapentin  400 mg Oral QHS    insulin aspart U-100  6-10 Units Subcutaneous TIDWM    insulin glargine U-100  10 Units Subcutaneous Daily    lactulose  10 g Oral TID    levETIRAcetam  1,500 mg Oral BID    mupirocin   Nasal BID    polyethylene glycol  17 g Oral Daily    potassium chloride  40 mEq Oral BID    senna-docusate 8.6-50 mg  1 tablet Oral Daily    warfarin  1 mg Oral Every Mon, Fri    And    warfarin  1.5 mg Oral Once per day on Sunday Tuesday Wednesday Thursday Saturday     PRN Meds:  Current Facility-Administered Medications:     acetaminophen, 650 mg, Oral, Q6H PRN    dextrose 50%, 12.5 g, Intravenous, PRN    dextrose 50%, 25 g, Intravenous, PRN    glucagon (human recombinant), 1 mg, Intramuscular, PRN    glucose, 16 g, Oral, PRN    glucose, 24 g, Oral, PRN    hydrOXYzine HCL, 50 mg, Oral, TID PRN    influenza, 0.5 mL, Intramuscular, vaccine x 1 dose    insulin aspart U-100, 0-10 Units, Subcutaneous, QID (AC + HS) PRN    Review of patient's allergies indicates:   Allergen Reactions    Bumex [bumetanide] Hives     Objective:     Vital Signs (Most Recent):  Temp: 97.8 °F (36.6 °C) (01/07/25 1129)  Pulse: 86 (01/07/25 1129)  Resp: 16 (01/07/25 1129)  BP: (!) 72/0 (01/07/25 1140)  SpO2: 98 % (01/07/25 0753) Vital Signs (24h Range):  Temp:  [97.6 °F (36.4 °C)-98.4 °F  "(36.9 °C)] 97.8 °F (36.6 °C)  Pulse:  [77-91] 86  Resp:  [16-18] 16  SpO2:  [94 %-99 %] 98 %  BP: ()/(0-104) 72/0     Patient Vitals for the past 72 hrs (Last 3 readings):   Weight   01/06/25 0422 73.4 kg (161 lb 13.1 oz)   01/05/25 0437 75.9 kg (167 lb 5.3 oz)     Body mass index is 20.23 kg/m².      Intake/Output Summary (Last 24 hours) at 1/7/2025 1240  Last data filed at 1/7/2025 1202  Gross per 24 hour   Intake 1855.55 ml   Output 1750 ml   Net 105.55 ml       Hemodynamic Parameters:       Telemetry: reviewed        Physical Exam  Vitals and nursing note reviewed.   Constitutional:       Appearance: Normal appearance.   HENT:      Head: Normocephalic.      Mouth/Throat:      Mouth: Mucous membranes are moist.   Eyes:      Pupils: Pupils are equal, round, and reactive to light.   Neck:      Comments: JVP elevation to mid-upper neck   Cardiovascular:      Comments: Smooth VAD hum   Pulmonary:      Effort: Pulmonary effort is normal.   Abdominal:      General: Bowel sounds are normal. There is no distension.      Palpations: Abdomen is soft.   Musculoskeletal:         General: Normal range of motion.      Right lower leg: No edema.      Left lower leg: No edema.   Skin:     General: Skin is warm.      Capillary Refill: Capillary refill takes 2 to 3 seconds.   Neurological:      General: No focal deficit present.      Mental Status: He is alert and oriented to person, place, and time.   Psychiatric:         Mood and Affect: Mood normal.         Behavior: Behavior normal.            Significant Labs:  CBC:  Recent Labs   Lab 01/05/25  0246 01/06/25  0336 01/07/25  0233   WBC 10.89 11.55 9.46   RBC 4.33* 4.66 4.72   HGB 8.0* 8.3* 8.4*   HCT 27.3* 29.8* 29.9*    434 420   MCV 63* 64* 63*   MCH 18.5* 17.8* 17.8*   MCHC 29.3* 27.9* 28.1*     BNP:  No results for input(s): "BNP" in the last 168 hours.    Invalid input(s): "BNPTRIASHAWN"    CMP:  Recent Labs   Lab 01/03/25  0735 01/03/25  1114 01/05/25  1740 " 01/06/25  0336 01/06/25  1104 01/06/25  2047 01/07/25  0233   *   < > 119*  --  94 118* 190*   CALCIUM 8.7   < > 8.5*  --  8.5* 8.5* 8.6*   ALBUMIN 2.4*  --  2.6* 2.6*  --   --   --    PROT 8.3  --  8.8* 8.5*  --   --   --    *   < > 134*  --  132* 132* 133*   K 4.0   < > 3.7  --  3.6 3.5 3.9   CO2 24   < > 27  --  24 26 24   CL 99   < > 98  --  98 98 99   BUN 21*   < > 17  --  17 18 21*   CREATININE 1.2   < > 1.2  --  1.1 1.2 1.2   ALKPHOS 153*  --  153* 162*  --   --   --    ALT 6*  --  6* 6*  --   --   --    AST 22  --  23 27  --   --   --    BILITOT 2.0*  --  1.7* 1.8*  --   --   --     < > = values in this interval not displayed.      Coagulation:   Recent Labs   Lab 01/05/25  0246 01/06/25 0336 01/07/25  0233   INR 3.0* 3.2* 2.4*   APTT 32.4* 33.7* 33.3*     LDH:  Recent Labs   Lab 01/05/25  0246 01/06/25  0336 01/07/25  0233    273* 263*     Microbiology:  Microbiology Results (last 7 days)       ** No results found for the last 168 hours. **            I have reviewed all pertinent labs within the past 24 hours.    Estimated Creatinine Clearance: 85.8 mL/min (based on SCr of 1.2 mg/dL).    Diagnostic Results:  I have reviewed all pertinent imaging results/findings within the past 24 hours.

## 2025-01-08 ENCOUNTER — PATIENT MESSAGE (OUTPATIENT)
Dept: TRANSPLANT | Facility: CLINIC | Age: 40
End: 2025-01-08
Payer: MEDICAID

## 2025-01-08 VITALS
RESPIRATION RATE: 18 BRPM | HEART RATE: 93 BPM | SYSTOLIC BLOOD PRESSURE: 80 MMHG | TEMPERATURE: 98 F | HEIGHT: 75 IN | BODY MASS INDEX: 20.12 KG/M2 | OXYGEN SATURATION: 95 % | WEIGHT: 161.81 LBS

## 2025-01-08 LAB
ALBUMIN SERPL BCP-MCNC: 2.5 G/DL (ref 3.5–5.2)
ALP SERPL-CCNC: 165 U/L (ref 40–150)
ALT SERPL W/O P-5'-P-CCNC: 10 U/L (ref 10–44)
ANION GAP SERPL CALC-SCNC: 10 MMOL/L (ref 8–16)
ANION GAP SERPL CALC-SCNC: 9 MMOL/L (ref 8–16)
APTT PPP: 35.3 SEC (ref 21–32)
AST SERPL-CCNC: 32 U/L (ref 10–40)
BASOPHILS # BLD AUTO: 0.03 K/UL (ref 0–0.2)
BASOPHILS NFR BLD: 0.3 % (ref 0–1.9)
BILIRUB DIRECT SERPL-MCNC: 1 MG/DL (ref 0.1–0.3)
BILIRUB SERPL-MCNC: 1.4 MG/DL (ref 0.1–1)
BUN SERPL-MCNC: 21 MG/DL (ref 6–20)
BUN SERPL-MCNC: 21 MG/DL (ref 6–20)
CALCIUM SERPL-MCNC: 8.5 MG/DL (ref 8.7–10.5)
CALCIUM SERPL-MCNC: 8.7 MG/DL (ref 8.7–10.5)
CHLORIDE SERPL-SCNC: 100 MMOL/L (ref 95–110)
CHLORIDE SERPL-SCNC: 100 MMOL/L (ref 95–110)
CO2 SERPL-SCNC: 23 MMOL/L (ref 23–29)
CO2 SERPL-SCNC: 24 MMOL/L (ref 23–29)
CREAT SERPL-MCNC: 1.3 MG/DL (ref 0.5–1.4)
CREAT SERPL-MCNC: 1.5 MG/DL (ref 0.5–1.4)
CRP SERPL-MCNC: 16.1 MG/L (ref 0–8.2)
DIFFERENTIAL METHOD BLD: ABNORMAL
EOSINOPHIL # BLD AUTO: 0 K/UL (ref 0–0.5)
EOSINOPHIL NFR BLD: 0.4 % (ref 0–8)
ERYTHROCYTE [DISTWIDTH] IN BLOOD BY AUTOMATED COUNT: 23.9 % (ref 11.5–14.5)
EST. GFR  (NO RACE VARIABLE): >60 ML/MIN/1.73 M^2
EST. GFR  (NO RACE VARIABLE): >60 ML/MIN/1.73 M^2
GLUCOSE SERPL-MCNC: 121 MG/DL (ref 70–110)
GLUCOSE SERPL-MCNC: 191 MG/DL (ref 70–110)
HCT VFR BLD AUTO: 29.5 % (ref 40–54)
HGB BLD-MCNC: 8.3 G/DL (ref 14–18)
IMM GRANULOCYTES # BLD AUTO: 0.02 K/UL (ref 0–0.04)
IMM GRANULOCYTES NFR BLD AUTO: 0.2 % (ref 0–0.5)
INR PPP: 2.2 (ref 0.8–1.2)
LDH SERPL L TO P-CCNC: 290 U/L (ref 110–260)
LYMPHOCYTES # BLD AUTO: 1 K/UL (ref 1–4.8)
LYMPHOCYTES NFR BLD: 9.5 % (ref 18–48)
MAGNESIUM SERPL-MCNC: 2 MG/DL (ref 1.6–2.6)
MCH RBC QN AUTO: 17.9 PG (ref 27–31)
MCHC RBC AUTO-ENTMCNC: 28.1 G/DL (ref 32–36)
MCV RBC AUTO: 64 FL (ref 82–98)
MONOCYTES # BLD AUTO: 0.9 K/UL (ref 0.3–1)
MONOCYTES NFR BLD: 8.6 % (ref 4–15)
NEUTROPHILS # BLD AUTO: 8.6 K/UL (ref 1.8–7.7)
NEUTROPHILS NFR BLD: 81 % (ref 38–73)
NRBC BLD-RTO: 1 /100 WBC
PHOSPHATE SERPL-MCNC: 3.7 MG/DL (ref 2.7–4.5)
PLATELET # BLD AUTO: 433 K/UL (ref 150–450)
PMV BLD AUTO: 9.4 FL (ref 9.2–12.9)
POCT GLUCOSE: 125 MG/DL (ref 70–110)
POCT GLUCOSE: 140 MG/DL (ref 70–110)
POCT GLUCOSE: 145 MG/DL (ref 70–110)
POCT GLUCOSE: 206 MG/DL (ref 70–110)
POTASSIUM SERPL-SCNC: 3.4 MMOL/L (ref 3.5–5.1)
POTASSIUM SERPL-SCNC: 3.6 MMOL/L (ref 3.5–5.1)
PREALB SERPL-MCNC: 9 MG/DL (ref 20–43)
PROT SERPL-MCNC: 8.4 G/DL (ref 6–8.4)
PROTHROMBIN TIME: 23 SEC (ref 9–12.5)
RBC # BLD AUTO: 4.64 M/UL (ref 4.6–6.2)
SODIUM SERPL-SCNC: 133 MMOL/L (ref 136–145)
SODIUM SERPL-SCNC: 133 MMOL/L (ref 136–145)
WBC # BLD AUTO: 10.56 K/UL (ref 3.9–12.7)

## 2025-01-08 PROCEDURE — 80076 HEPATIC FUNCTION PANEL: CPT | Performed by: STUDENT IN AN ORGANIZED HEALTH CARE EDUCATION/TRAINING PROGRAM

## 2025-01-08 PROCEDURE — 84134 ASSAY OF PREALBUMIN: CPT | Performed by: STUDENT IN AN ORGANIZED HEALTH CARE EDUCATION/TRAINING PROGRAM

## 2025-01-08 PROCEDURE — 21400001 HC TELEMETRY ROOM

## 2025-01-08 PROCEDURE — 84100 ASSAY OF PHOSPHORUS: CPT | Performed by: STUDENT IN AN ORGANIZED HEALTH CARE EDUCATION/TRAINING PROGRAM

## 2025-01-08 PROCEDURE — 83735 ASSAY OF MAGNESIUM: CPT | Performed by: STUDENT IN AN ORGANIZED HEALTH CARE EDUCATION/TRAINING PROGRAM

## 2025-01-08 PROCEDURE — 25000003 PHARM REV CODE 250

## 2025-01-08 PROCEDURE — 25000003 PHARM REV CODE 250: Performed by: INTERNAL MEDICINE

## 2025-01-08 PROCEDURE — 36415 COLL VENOUS BLD VENIPUNCTURE: CPT | Performed by: STUDENT IN AN ORGANIZED HEALTH CARE EDUCATION/TRAINING PROGRAM

## 2025-01-08 PROCEDURE — 27000248 HC VAD-ADDITIONAL DAY

## 2025-01-08 PROCEDURE — 85730 THROMBOPLASTIN TIME PARTIAL: CPT | Performed by: STUDENT IN AN ORGANIZED HEALTH CARE EDUCATION/TRAINING PROGRAM

## 2025-01-08 PROCEDURE — 85610 PROTHROMBIN TIME: CPT | Performed by: STUDENT IN AN ORGANIZED HEALTH CARE EDUCATION/TRAINING PROGRAM

## 2025-01-08 PROCEDURE — 99232 SBSQ HOSP IP/OBS MODERATE 35: CPT | Mod: ,,, | Performed by: NURSE PRACTITIONER

## 2025-01-08 PROCEDURE — 25000003 PHARM REV CODE 250: Performed by: STUDENT IN AN ORGANIZED HEALTH CARE EDUCATION/TRAINING PROGRAM

## 2025-01-08 PROCEDURE — 63600175 PHARM REV CODE 636 W HCPCS: Performed by: STUDENT IN AN ORGANIZED HEALTH CARE EDUCATION/TRAINING PROGRAM

## 2025-01-08 PROCEDURE — 86140 C-REACTIVE PROTEIN: CPT | Performed by: STUDENT IN AN ORGANIZED HEALTH CARE EDUCATION/TRAINING PROGRAM

## 2025-01-08 PROCEDURE — 93750 INTERROGATION VAD IN PERSON: CPT | Mod: ,,, | Performed by: INTERNAL MEDICINE

## 2025-01-08 PROCEDURE — 83615 LACTATE (LD) (LDH) ENZYME: CPT | Performed by: STUDENT IN AN ORGANIZED HEALTH CARE EDUCATION/TRAINING PROGRAM

## 2025-01-08 PROCEDURE — 80048 BASIC METABOLIC PNL TOTAL CA: CPT | Performed by: STUDENT IN AN ORGANIZED HEALTH CARE EDUCATION/TRAINING PROGRAM

## 2025-01-08 PROCEDURE — 99233 SBSQ HOSP IP/OBS HIGH 50: CPT | Mod: ,,, | Performed by: INTERNAL MEDICINE

## 2025-01-08 PROCEDURE — 85025 COMPLETE CBC W/AUTO DIFF WBC: CPT | Performed by: STUDENT IN AN ORGANIZED HEALTH CARE EDUCATION/TRAINING PROGRAM

## 2025-01-08 RX ORDER — INSULIN ASPART 100 [IU]/ML
INJECTION, SOLUTION INTRAVENOUS; SUBCUTANEOUS
Start: 2025-01-08

## 2025-01-08 RX ORDER — AMLODIPINE BESYLATE 10 MG/1
10 TABLET ORAL DAILY
Qty: 90 TABLET | Refills: 3 | Status: SHIPPED | OUTPATIENT
Start: 2025-01-09 | End: 2026-01-09

## 2025-01-08 RX ORDER — POTASSIUM CHLORIDE 20 MEQ/1
20 TABLET, EXTENDED RELEASE ORAL ONCE
Status: COMPLETED | OUTPATIENT
Start: 2025-01-08 | End: 2025-01-08

## 2025-01-08 RX ADMIN — POTASSIUM CHLORIDE 40 MEQ: 1500 TABLET, EXTENDED RELEASE ORAL at 08:01

## 2025-01-08 RX ADMIN — FUROSEMIDE 80 MG: 10 INJECTION, SOLUTION INTRAVENOUS at 09:01

## 2025-01-08 RX ADMIN — GABAPENTIN 400 MG: 400 CAPSULE ORAL at 08:01

## 2025-01-08 RX ADMIN — GABAPENTIN 100 MG: 100 CAPSULE ORAL at 09:01

## 2025-01-08 RX ADMIN — INSULIN GLARGINE 10 UNITS: 100 INJECTION, SOLUTION SUBCUTANEOUS at 09:01

## 2025-01-08 RX ADMIN — INSULIN ASPART 6 UNITS: 100 INJECTION, SOLUTION INTRAVENOUS; SUBCUTANEOUS at 04:01

## 2025-01-08 RX ADMIN — CEFADROXIL 500 MG: 500 CAPSULE ORAL at 08:01

## 2025-01-08 RX ADMIN — FUROSEMIDE 80 MG: 10 INJECTION, SOLUTION INTRAVENOUS at 07:01

## 2025-01-08 RX ADMIN — ASPIRIN 81 MG: 81 TABLET, COATED ORAL at 09:01

## 2025-01-08 RX ADMIN — LEVETIRACETAM 1500 MG: 500 TABLET, FILM COATED ORAL at 09:01

## 2025-01-08 RX ADMIN — INSULIN ASPART 10 UNITS: 100 INJECTION, SOLUTION INTRAVENOUS; SUBCUTANEOUS at 08:01

## 2025-01-08 RX ADMIN — CEFADROXIL 500 MG: 500 CAPSULE ORAL at 09:01

## 2025-01-08 RX ADMIN — FUROSEMIDE 80 MG: 10 INJECTION, SOLUTION INTRAVENOUS at 02:01

## 2025-01-08 RX ADMIN — ATORVASTATIN CALCIUM 40 MG: 40 TABLET, FILM COATED ORAL at 09:01

## 2025-01-08 RX ADMIN — LEVETIRACETAM 1500 MG: 500 TABLET, FILM COATED ORAL at 08:01

## 2025-01-08 RX ADMIN — INSULIN ASPART 6 UNITS: 100 INJECTION, SOLUTION INTRAVENOUS; SUBCUTANEOUS at 11:01

## 2025-01-08 RX ADMIN — WARFARIN SODIUM 1.5 MG: 1 TABLET ORAL at 05:01

## 2025-01-08 RX ADMIN — AMLODIPINE BESYLATE 10 MG: 10 TABLET ORAL at 09:01

## 2025-01-08 RX ADMIN — EMPAGLIFLOZIN 10 MG: 10 TABLET, FILM COATED ORAL at 09:01

## 2025-01-08 RX ADMIN — AMIODARONE HYDROCHLORIDE 200 MG: 200 TABLET ORAL at 09:01

## 2025-01-08 RX ADMIN — SENNOSIDES AND DOCUSATE SODIUM 1 TABLET: 50; 8.6 TABLET ORAL at 09:01

## 2025-01-08 RX ADMIN — POTASSIUM CHLORIDE 40 MEQ: 1500 TABLET, EXTENDED RELEASE ORAL at 09:01

## 2025-01-08 RX ADMIN — EPLERENONE 50 MG: 25 TABLET, FILM COATED ORAL at 09:01

## 2025-01-08 RX ADMIN — HYDROXYZINE HYDROCHLORIDE 50 MG: 25 TABLET ORAL at 02:01

## 2025-01-08 RX ADMIN — INSULIN ASPART 2 UNITS: 100 INJECTION, SOLUTION INTRAVENOUS; SUBCUTANEOUS at 08:01

## 2025-01-08 RX ADMIN — LACTULOSE 10 G: 20 SOLUTION ORAL at 09:01

## 2025-01-08 RX ADMIN — POTASSIUM CHLORIDE 20 MEQ: 1500 TABLET, EXTENDED RELEASE ORAL at 11:01

## 2025-01-08 RX ADMIN — DULOXETINE HYDROCHLORIDE 30 MG: 30 CAPSULE, DELAYED RELEASE ORAL at 09:01

## 2025-01-08 NOTE — ASSESSMENT & PLAN NOTE
Endocrinology consulted for BG management.   BG goal 140-180    - Lantus (Insulin Glargine) 10 units daily   - Novolog (Insulin Aspart) 6-10 units TIDWM. Administer 6 units if patient eats 25% -  50% and 10 units if patient eats 50% or more (20% reduction due to prandial blood glucose below goal). HOLD if patient is NPO, unable to eat, or if Blood Glucose is less than 100 mg/dL.   - [facility] [place]150/25[place]  - BG checks AC/HS  - Hypoglycemia protocol in place    ** Please notify Endocrine for any change and/or advance in diet**  ** Please call Endocrine for any BG related issues **    Discharge Planning:   TBD. Please notify endocrinology prior to discharge.

## 2025-01-08 NOTE — PLAN OF CARE
Pt free of falls and injury. Pt denies any pain or discomfort. Pt AAOx4. Fall precautions remain in place. LVAD hum present and smooth. VAD numbers and dopplers WDL. DLES dressing CDI. Plan of care reviewed with pt.       Problem: Pain Acute  Goal: Optimal Pain Control and Function  1/8/2025 0453 by Samina Estrada RN  Outcome: Not Progressing  1/8/2025 0453 by Samina Estrada RN  Outcome: Progressing     Problem: Ventricular Assist Device  Goal: Optimal Adjustment to Device  1/8/2025 0453 by Samina Estrada RN  Outcome: Not Progressing  1/8/2025 0453 by Samina Estrada RN  Outcome: Progressing  Goal: Absence of Bleeding  1/8/2025 0453 by Samina Estrada RN  Outcome: Not Progressing  1/8/2025 0453 by Samina Estrada RN  Outcome: Progressing  Goal: Absence of Embolism Signs and Symptoms  1/8/2025 0453 by Samina Estrada RN  Outcome: Not Progressing  1/8/2025 0453 by Samina Estrada RN  Outcome: Progressing  Goal: Optimal Blood Flow  1/8/2025 0453 by Samina Estrada RN  Outcome: Not Progressing  1/8/2025 0453 by Samina Estrada RN  Outcome: Progressing  Goal: Absence of Infection Signs and Symptoms  1/8/2025 0453 by Samina Estrada RN  Outcome: Not Progressing  1/8/2025 0453 by Samina Estrada RN  Outcome: Progressing  Goal: Effective Right-Sided Heart Function  1/8/2025 0453 by Samina Estrada RN  Outcome: Not Progressing  1/8/2025 0453 by Samina Estrada RN  Outcome: Progressing     Problem: Fall Injury Risk  Goal: Absence of Fall and Fall-Related Injury  1/8/2025 0453 by Samina Estrada RN  Outcome: Not Progressing  1/8/2025 0453 by Samina Estrada RN  Outcome: Progressing     Problem: Infection  Goal: Absence of Infection Signs and Symptoms  1/8/2025 0453 by Samina Estrada RN  Outcome: Not Progressing  1/8/2025 0453 by Samina Estrada, RN  Outcome: Progressing     Problem: Heart Failure  Goal: Optimal Coping  1/8/2025 0453 by Sean  RIAN Haas  Outcome: Not Progressing  1/8/2025 0453 by Samina Estrada RN  Outcome: Progressing  Goal: Optimal Cardiac Output  1/8/2025 0453 by Samina Estrada RN  Outcome: Not Progressing  1/8/2025 0453 by Samina Estrada RN  Outcome: Progressing  Goal: Stable Heart Rate and Rhythm  1/8/2025 0453 by Samina Estrada RN  Outcome: Not Progressing  1/8/2025 0453 by Samina Estrada RN  Outcome: Progressing  Goal: Optimal Functional Ability  1/8/2025 0453 by Samina Estrada RN  Outcome: Not Progressing  1/8/2025 0453 by Samina Estrada RN  Outcome: Progressing  Goal: Fluid and Electrolyte Balance  1/8/2025 0453 by Samina Estrada RN  Outcome: Not Progressing  1/8/2025 0453 by Samina Estrada RN  Outcome: Progressing    Problem: Diabetes  Goal: Optimal Coping  1/8/2025 0453 by Samina Estrada RN  Outcome: Not Progressing  1/8/2025 0453 by Samina Estrada RN  Outcome: Progressing  Goal: Optimal Functional Ability  1/8/2025 0453 by Samina Estrada RN  Outcome: Not Progressing  1/8/2025 0453 by Samina Estrada RN  Outcome: Progressing  Goal: Blood Glucose Level Within Target Range  1/8/2025 0453 by Samina Estrada RN  Outcome: Not Progressing  1/8/2025 0453 by Samina Estrada RN  Outcome: Progressing  Goal: Minimize Risk of Hypoglycemia  1/8/2025 0453 by Samina Estrada RN  Outcome: Not Progressing  1/8/2025 0453 by Samina Estrada RN  Outcome: Progressing

## 2025-01-08 NOTE — PROGRESS NOTES
DISCHARGE NOTE:    Kevan Queen is a 39 y.o. male s/p HM3 from 6.23.22 was admitted for evaluation of LE edema is preparing for discharge today.     Past Medical History:   Diagnosis Date    Acute respiratory failure with hypoxia 07/22/2023    Arthritis     Awareness alteration, transient 09/01/2022    Cardiomyopathy     CHF (congestive heart failure) 10/01/2020    COVID-19 06/03/2023    Diabetes mellitus     Dilated cardiomyopathy 10/26/2020    Drug abuse 10/2020    Headache 04/19/2023    Hyperglycemia 12/16/2022    Hyperosmolar hyperglycemic state (HHS) 05/25/2022    ICD (implantable cardioverter-defibrillator) in place 10/26/2020    Infected defibrillator now s/p removal Nov 2023 07/23/2023    Left ventricular assist device (LVAD) complication, initial encounter 06/05/2023    Muscle cramping 06/15/2022    Renal disorder     SOB (shortness of breath) 06/13/2022    Tingling in extremities 07/13/2022       Hospital Course: During his admission Mr. Queen underwent IV diuresis. His GDMT was optimized to include SGLT2. He continued cefadroxil for chronic suppression of MSSA bacteremia and CDLI.     His INR today is 2.2. Plan to continue his home warfarin regimen of 1.5mg orally daily.     Pharmacy Interventions/Recommendations:  1) INR Goal: 2-3    2) Antiplatelet Agents: ASA 81mg     3) Heparin Bridging:  UFH / LMWH     4) INR Follow-Up/Discharge Needs:  Monday with [facility]     See list of discharge medication for dosing instructions.     Kevan Queen and his caregiver verbalized their understanding and had the opportunity to ask questions.      Discharge Medications:     Medication List        START taking these medications      empagliflozin 10 mg tablet  Commonly known as: Jardiance  Take 1 tablet (10 mg total) by mouth once daily.  Start taking on: January 9, 2025            CHANGE how you take these medications      amLODIPine 10 MG tablet  Commonly known as: NORVASC  Take 1 tablet  "(10 mg total) by mouth once daily.  Start taking on: January 9, 2025  What changed:   medication strength  how much to take     insulin detemir U-100 (Levemir) 100 unit/mL (3 mL) Inpn pen  Inject 10 Units into the skin 2 (two) times daily. In the morning and before bed.  What changed: how much to take            CONTINUE taking these medications      acetaminophen 325 MG tablet  Commonly known as: TYLENOL  Take 2 tablets (650 mg total) by mouth every 6 (six) hours as needed for Pain.     amiodarone 200 MG Tab  Commonly known as: PACERONE  Take 1 tablet (200 mg total) by mouth once daily.     aspirin 81 MG EC tablet  Commonly known as: ECOTRIN  Take 1 tablet (81 mg total) by mouth once daily.     atorvastatin 40 MG tablet  Commonly known as: LIPITOR  Take 1 tablet (40 mg total) by mouth once daily.     BAQSIMI 3 mg/actuation Spry  Generic drug: glucagon  1 each by Nasal route as needed (hypoglycemia).     BD ULTRA-FINE GARTH PEN NEEDLE 32 gauge x 5/32" Ndle  Generic drug: pen needle, diabetic  1 each by Misc.(Non-Drug; Combo Route) route 4 (four) times daily.     blood sugar diagnostic Strp  Use to test blood glucose 4 (four) times daily.     blood-glucose meter Misc  Commonly known as: TRUE METRIX GLUCOSE METER  Use to test blood glucose 4 (four) times daily.     cefadroxil 500 MG Cap  Commonly known as: DURICEF  Take 1 capsule (500 mg total) by mouth every 12 (twelve) hours.     DULoxetine 30 MG capsule  Commonly known as: CYMBALTA  Take 1 capsule (30 mg total) by mouth once daily.     eplerenone 25 MG Tab  Commonly known as: INSPRA  Take 2 tablets (50 mg total) by mouth once daily.     FREESTYLE LUCIUS 3 SENSOR Dee  Generic drug: blood-glucose sensor  1 Device by Misc.(Non-Drug; Combo Route) route every 14 (fourteen) days.     gabapentin 100 MG capsule  Commonly known as: NEURONTIN  Take 1 capsule (100 mg total) by mouth 2 (two) times daily AND 4 capsules (400 mg total) every evening.     insulin aspart U-100 100 " unit/mL (3 mL) Inpn pen  Commonly known as: NovoLOG  Inject 18 Units into the skin 3 (three) times daily with meals: MAX 94 units/daily  150-200    201-250    251-300    301-350    >350  +2 units   +4 units    +6 units    +8 units    +10 units     lancets 33 gauge Misc  Use to test blood glucose 4 (four) times daily.     levETIRAcetam 1000 MG tablet  Commonly known as: KEPPRA  Take 1.5 tablets (1,500 mg total) by mouth 2 (two) times daily.     magnesium oxide 400 mg (241.3 mg magnesium) tablet  Commonly known as: MAG-OX  Take 1 tablet (400 mg total) by mouth once daily.     ondansetron 4 MG Tbdl  Commonly known as: ZOFRAN-ODT  Take 1 tablet (4 mg total) by mouth every 8 (eight) hours as needed (nausea).     pantoprazole 40 MG tablet  Commonly known as: PROTONIX  Take 1 tablet (40 mg total) by mouth once daily.     polyethylene glycol 17 gram Pwpk  Commonly known as: GLYCOLAX  Take 17 g by mouth once daily.     potassium chloride SA 10 MEQ tablet  Commonly known as: K-DUR,KLOR-CON M  Take 3 tablets (30 mEq total) by mouth once daily.     senna-docusate 8.6-50 mg 8.6-50 mg per tablet  Commonly known as: PERICOLACE  Take 1 tablet by mouth daily as needed for Constipation.     torsemide 20 MG Tab  Commonly known as: DEMADEX  Take 3 tablets (60 mg total) by mouth 2 (two) times a day.     warfarin 3 MG tablet  Commonly known as: COUMADIN  Take 0.5 tablets (1.5 mg total) by mouth Daily.            STOP taking these medications      D5W PgBk 50 mL with ceFAZolin 2 gram SolR 6 g               Where to Get Your Medications        These medications were sent to [id number] - [address] & [address] [address]      Hours: 24-hours Phone: [phone]   amLODIPine 10 MG tablet  empagliflozin 10 mg tablet       Information about where to get these medications is not yet available    Ask your nurse or doctor about these  medications  insulin aspart U-100 100 unit/mL (3 mL) Inpn pen  insulin detemir U-100 (Levemir) 100 unit/mL (3 mL) Inpn pen

## 2025-01-08 NOTE — ASSESSMENT & PLAN NOTE
-HeartMate 3 Implanted 6/23/2022 with early RV failure requiring RVAD with ProTek Duo   -Continue Coumadin,  Goal INR 2.0-3.0 . Therapeutic today.  Previous home regimen 1.5mg Qdaily  -Antiplatelets ASA 81 mg, on hold with GIB  -LDH is stable overall today. Will continue to monitor daily.  -Speed set at 5100  -Interrogation notable for no events  -Not listed for OHTx      Procedure: Device Interrogation Including analysis of device parameters  Current Settings: Ventricular Assist Device  Review of device function is stable      1/8/2025    11:55 AM 1/8/2025     8:00 AM 1/8/2025     4:48 AM 1/7/2025    11:25 PM 1/7/2025     7:32 PM 1/7/2025     3:30 PM 1/7/2025    11:36 AM   TXP LVAD INTERROGATIONS   Type  HeartMate3  HeartMate3  HeartMate3 HeartMate3   Flow 4.3 4.1 4 3.9 4.2 3.8 4.1   Speed 5100 5100 5100 5100 5100 5100 5100   PI 4.2 4.2 4.9 5.2 4.1 5.7 4.7   Power (Serna) 3.7 3.6 3.6 3.6 3.6 3.6 3.6   LSL 4700 4700 4700 4700 4700 4700 4700   Pulsatility Intermittent pulse Intermittent pulse Intermittent pulse Intermittent pulse Intermittent pulse  Intermittent pulse

## 2025-01-08 NOTE — ASSESSMENT & PLAN NOTE
-NICM  -Last 2D Echo 8/26/24: LVEF 5-10%, LVEDD 7.2 cm    -Current diuretic regimen: Switch Laisx IVP 80 TID to torsemide 80 BID ( home dose 60 BID)  -GDMT with home dose of Eplerenone.  Will add meds as outpatient as tolerated   -Patient is not currently being evaluated for OHTx due to non compliance  -2g Na dietary restriction, 1500 mL fluid restriction, strict I/Os

## 2025-01-08 NOTE — PROGRESS NOTES
"[facility] - Cardiac Intensive Care  Endocrinology  Progress Note    Admit Date: 12/31/2024     Reason for Consult: Management of T2DM, Hyperglycemia     Surgical Procedure and Date: LVAD 06/29/2022      Diabetes diagnosis year: 2022    Lab Results   Component Value Date    HGBA1C 10.3 (H) 08/25/2024       Home Diabetes Medications:  Levemir 20 units twice daily and Novolog 10 or 18 units with meals per patient    How often checking glucose at home? 3-4x day   BG readings on regimen: 150-200s  Hypoglycemia on the regimen?  Yes, at least once weekly; experiences blurred vision  Missed doses on regimen?  Yes, he has been out of Levemir "for a while" since it has been discontinued    Diabetes Complications include:   Hyperglycemia     Complicating diabetes co morbidities:   History of MI, CHF, and CKD      HPI:   Patient is a 39 y.o. 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 He underwent RVAD removal and chest closure 6/30/2022. He presented with one month hx of dyspnea and abdominal distention. He reported bilateral LE edema which prompted him to come to the ED with the assistance of his family as they drove from [place]. He reported intermittent chest pain with coughing. Endo consulted for BG management.            Interval HPI:   Overnight events: No acute events overnight. Patient in room [id number] A. Blood glucose stable. BG at goal on current insulin regimen (SSI, prandial, and basal insulin ). Steroid use- None.    Renal function- Normal   Vasopressors-  None       Endocrine will continue to follow and manage insulin orders inpatient.         Diet diabetic Cardiac (Low Na/Chol), Low Sodium,2gm; 2000 Calories (up to 75 gm per meal); Fluid - 1500mL " "    Eatin%  Nausea: No  Hypoglycemia and intervention: No  Fever: No  TPN and/or TF: No      BP (!) 90/0 (BP Location: Right arm, Patient Position: Lying)   Pulse 82   Temp 97.7 °F (36.5 °C) (Oral)   Resp 18   Ht 6' 3" (1.905 m)   Wt 73.4 kg (161 lb 13.1 oz)   SpO2 95%   BMI 20.23 kg/m²     Labs Reviewed and Include    Recent Labs   Lab 25  0413   *   CALCIUM 8.7   ALBUMIN 2.5*   PROT 8.4   *   K 3.4*   CO2 23      BUN 21*   CREATININE 1.3   ALKPHOS 165*   ALT 10   AST 32   BILITOT 1.4*     Lab Results   Component Value Date    WBC 10.56 2025    HGB 8.3 (L) 2025    HCT 29.5 (L) 2025    MCV 64 (L) 2025     2025     No results for input(s): "TSH", "FREET4" in the last 168 hours.  Lab Results   Component Value Date    HGBA1C 10.6 (H) 2024       Nutritional status:   Body mass index is 20.23 kg/m².  Lab Results   Component Value Date    ALBUMIN 2.5 (L) 2025    ALBUMIN 2.6 (L) 2025    ALBUMIN 2.6 (L) 2025     Lab Results   Component Value Date    PREALBUMIN 9 (L) 2025    PREALBUMIN 9 (L) 2025    PREALBUMIN 8 (L) 2025       Estimated Creatinine Clearance: 79.2 mL/min (based on SCr of 1.3 mg/dL).    Accu-Checks  Recent Labs     25  1942 25  0821 25  1221 25  1635 25  1921 25  0821 25  1130 25  1640 25  1939 25  0828   POCTGLUCOSE 108 127* 117* 99 239* 147* 169* 116* 232* 125*       Current Medications and/or Treatments Impacting Glycemic Control  Immunotherapy:    Immunosuppressants       None          Steroids:   Hormones (From admission, onward)      None          Pressors:    Autonomic Drugs (From admission, onward)      None          Hyperglycemia/Diabetes Medications:   Antihyperglycemics (From admission, onward)      Start     Stop Route Frequency Ordered    25 1200  empagliflozin (Jardiance) tablet 10 mg        Question Answer Comment "   Does this patient have a diagnosis of heart failure? Yes    Does this patient have type 1 diabetes or diabetic ketoacidosis? No    Does this patient have symptomatic hypotension? No    Is the patient NPO or pending major surgery in next 3 days or less? No        -- Oral Daily 01/07/25 1054    01/06/25 1130  insulin aspart U-100 pen 6-10 Units         -- SubQ 3 times daily with meals 01/06/25 0901    01/01/25 0900  insulin glargine U-100 (Lantus) pen 10 Units         -- SubQ Daily 01/01/25 0813    12/31/24 1525  insulin aspart U-100 pen 0-10 Units         -- SubQ Before meals & nightly PRN 12/31/24 1426            ASSESSMENT and PLAN    Cardiac/Vascular  * Acute decompensated heart failure  Managed by primary team  Optimize blood glucose control        LVAD (left ventricular assist device) present  Managed by primary team      Endocrine  Type 2 diabetes mellitus with hyperglycemia, with long-term current use of insulin  Endocrinology consulted for BG management.   BG goal 140-180    - Lantus (Insulin Glargine) 10 units daily   - Novolog (Insulin Aspart) 6-10 units TIDWM. Administer 6 units if patient eats 25% -  50% and 10 units if patient eats 50% or more (20% reduction due to prandial blood glucose below goal). HOLD if patient is NPO, unable to eat, or if Blood Glucose is less than 100 mg/dL.   - [facility] SSI (150/25)  - BG checks AC/HS  - Hypoglycemia protocol in place    ** Please notify Endocrine for any change and/or advance in diet**  ** Please call Endocrine for any BG related issues **    Discharge Planning:   Lower insulin requirements then reported the patient takes at home.  - Lantus 10 units daily  - Novolog 10 units with meals _ SSI  - Novolog SSI  150 - 200 + 2 unit    201 - 250 + 4 units    251 - 300 + 6 units    301 - 350 + 8 units       > 350   + 10 units  - Jardiance 10 mg  - Send logs to endocrine every 3 days for review.             Michael Wyman, DNP, FNP  Endocrinology  [facility] - Cardiac  Intensive Care

## 2025-01-08 NOTE — CARE UPDATE
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

## 2025-01-08 NOTE — PROGRESS NOTES
Discharge    Pt presents in room aaox4 with open affect. Caregiver not present at this time. Pt voices agreement with plan to discharge to home today. No home needs voiced by pt or indicated by medical team. Pt's mother will be here after 5 to transport pt home. Pt reports coping well and denies further needs, questions, concerns at this time and none indicated. Providing psychosocial and counseling support, education, resources, assistance and discharge planning as indicated. SW remains available.

## 2025-01-08 NOTE — SUBJECTIVE & OBJECTIVE
Interval History: No overnight events. Net negative 2632 mL in last 24 hours. Will stop lasix IVP 80 TID and switch to torsemide 80 BID for discharge - takes 60 BID at home - compliance stressed.    Anxiety much better.         Scheduled Meds:   amiodarone  200 mg Oral Daily    amLODIPine  10 mg Oral Daily    aspirin  81 mg Oral Daily    atorvastatin  40 mg Oral Daily    cefadroxil  500 mg Oral Q12H    DULoxetine  30 mg Oral Daily    empagliflozin  10 mg Oral Daily    eplerenone  50 mg Oral Daily    furosemide (LASIX) injection  80 mg Intravenous TID WAKE    gabapentin  100 mg Oral Daily    And    gabapentin  400 mg Oral QHS    insulin aspart U-100  6-10 Units Subcutaneous TIDWM    insulin glargine U-100  10 Units Subcutaneous Daily    lactulose  10 g Oral TID    levETIRAcetam  1,500 mg Oral BID    mupirocin   Nasal BID    polyethylene glycol  17 g Oral Daily    potassium chloride  40 mEq Oral BID    senna-docusate 8.6-50 mg  1 tablet Oral Daily    warfarin  1 mg Oral Every Mon, Fri    And    warfarin  1.5 mg Oral Once per day on Sunday Tuesday Wednesday Thursday Saturday     PRN Meds:  Current Facility-Administered Medications:     acetaminophen, 650 mg, Oral, Q6H PRN    dextrose 50%, 12.5 g, Intravenous, PRN    dextrose 50%, 25 g, Intravenous, PRN    glucagon (human recombinant), 1 mg, Intramuscular, PRN    glucose, 16 g, Oral, PRN    glucose, 24 g, Oral, PRN    influenza, 0.5 mL, Intramuscular, vaccine x 1 dose    insulin aspart U-100, 0-10 Units, Subcutaneous, QID (AC + HS) PRN    Review of patient's allergies indicates:   Allergen Reactions    Bumex [bumetanide] Hives     Objective:     Vital Signs (Most Recent):  Temp: 98.1 °F (36.7 °C) (01/08/25 1141)  Pulse: 94 (01/08/25 1141)  Resp: 18 (01/08/25 1141)  BP: (!) 82/0 (01/08/25 1145)  SpO2: 99 % (01/08/25 1141) Vital Signs (24h Range):  Temp:  [97.4 °F (36.3 °C)-98.1 °F (36.7 °C)] 98.1 °F (36.7 °C)  Pulse:  [62-99] 94  Resp:  [18-19] 18  SpO2:  [95 %-100 %] 99  "%  BP: (81-90)/(0-66) 82/0     Patient Vitals for the past 72 hrs (Last 3 readings):   Weight   01/06/25 0422 73.4 kg (161 lb 13.1 oz)     Body mass index is 20.23 kg/m².      Intake/Output Summary (Last 24 hours) at 1/8/2025 1304  Last data filed at 1/8/2025 1124  Gross per 24 hour   Intake 878.01 ml   Output 3975 ml   Net -3096.99 ml       Hemodynamic Parameters:       Telemetry: reviewed        Physical Exam  Vitals and nursing note reviewed.   Constitutional:       Appearance: Normal appearance.   HENT:      Head: Normocephalic.      Mouth/Throat:      Mouth: Mucous membranes are moist.   Eyes:      Pupils: Pupils are equal, round, and reactive to light.   Neck:      Comments: JVP elevation to mid-upper neck   Cardiovascular:      Comments: Smooth VAD hum   Pulmonary:      Effort: Pulmonary effort is normal.   Abdominal:      General: Bowel sounds are normal. There is no distension.      Palpations: Abdomen is soft.   Musculoskeletal:         General: Normal range of motion.      Right lower leg: No edema.      Left lower leg: No edema.   Skin:     General: Skin is warm.      Capillary Refill: Capillary refill takes 2 to 3 seconds.   Neurological:      General: No focal deficit present.      Mental Status: He is alert and oriented to person, place, and time.   Psychiatric:         Mood and Affect: Mood normal.         Behavior: Behavior normal.            Significant Labs:  CBC:  Recent Labs   Lab 01/06/25  0336 01/07/25 0233 01/08/25  0413   WBC 11.55 9.46 10.56   RBC 4.66 4.72 4.64   HGB 8.3* 8.4* 8.3*   HCT 29.8* 29.9* 29.5*    420 433   MCV 64* 63* 64*   MCH 17.8* 17.8* 17.9*   MCHC 27.9* 28.1* 28.1*     BNP:  No results for input(s): "BNP" in the last 168 hours.    Invalid input(s): "BNPTRIAGELBLO"    CMP:  Recent Labs   Lab 01/05/25  1740 01/06/25  0336 01/06/25  1104 01/07/25  0233 01/07/25  2009 01/08/25  0413   *  --    < > 190* 144* 121*   CALCIUM 8.5*  --    < > 8.6* 8.5* 8.7   ALBUMIN " 2.6* 2.6*  --   --   --  2.5*   PROT 8.8* 8.5*  --   --   --  8.4   *  --    < > 133* 136 133*   K 3.7  --    < > 3.9 3.7 3.4*   CO2 27  --    < > 24 25 23   CL 98  --    < > 99 100 100   BUN 17  --    < > 21* 20 21*   CREATININE 1.2  --    < > 1.2 1.4 1.3   ALKPHOS 153* 162*  --   --   --  165*   ALT 6* 6*  --   --   --  10   AST 23 27  --   --   --  32   BILITOT 1.7* 1.8*  --   --   --  1.4*    < > = values in this interval not displayed.      Coagulation:   Recent Labs   Lab 01/06/25 0336 01/07/25 0233 01/08/25 0413   INR 3.2* 2.4* 2.2*   APTT 33.7* 33.3* 35.3*     LDH:  Recent Labs   Lab 01/06/25  0336 01/07/25  0233 01/08/25  0413   * 263* 290*     Microbiology:  Microbiology Results (last 7 days)       ** No results found for the last 168 hours. **            I have reviewed all pertinent labs within the past 24 hours.    Estimated Creatinine Clearance: 79.2 mL/min (based on SCr of 1.3 mg/dL).    Diagnostic Results:  I have reviewed all pertinent imaging results/findings within the past 24 hours.

## 2025-01-08 NOTE — HOSPITAL COURSE
3 6/23/22  for NICM speed 5100 with hx of substance abuse, medical noncompliance (was taken off of milrinone earlier this year 2/2 noncompliance) and seizure disorder admitted with ADHF. Was diuresed with lasix drip which was weaned to IVP lasix with good response. Plan to increase torsemide to 80 BID from 60 BID for discharge. Jardiance was started for GDMT during this hospitalization.     -Last 2D Echo 8/26/24: LVEF 5-10%, LVEDD 7.2 cm  -Diuretic regimen:  torsemide 80 BID ( home dose 60 BID)  -GDMT - home dose of Eplerenone.  Jardiance started. Will titrate meds as outpatient as tolerated   Weight on discharge: 73.4 Kg

## 2025-01-08 NOTE — DISCHARGE SUMMARY
[facility]  Heart Transplant  Discharge Summary      Patient Name: Kevan Queen  MRN: [id number]  Admission Date: 12/31/2024  Hospital Length of Stay: 8 days  Discharge Date and Time: 01/08/2025 1:10 PM  Attending Physician: Dalia Crum MD   Discharging Provider: Enrique Vazquez MD  Primary Care Provider: Rosio Armendariz FNP     HPI: Kevan Queen is a 39 year old male with stage D 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 He underwent RVAD removal and chest closure 6/30/2022. He is off inotropes. He presents with one month hx of dyspnea and abdominal distention. Stated he takes lasix 80mg daily. He develops rash to both torsemide and bumex. He reports yesterday he noticed bilateral LE edema which prompted him to come to the ED with the assistance of his family as they drove from [place]. He reports intermittent chest pain with coughing but not occurring at the moment. He denies any LVAD alarms. He reports eating salty foods yesterday.      On 11/15/23 underwent removal of eroded [employer] scICD--no Lifevest (due to LVAD) and no plan to replace ICD as not requiring therapy post LVAD with concern for reinfection.      Of note, he presented to [facility]  on 12/8 for dyspnea and hyperglycemia, felt better so was discharged.     Upon arrival to the ER today, he was hemodynamically stable. Labs demonstrated (hyponatremia Na 131), asiya Ca 9.5, Hgb 7.9 (at baseline), . He was given lasix IV 60mg and made about 300cc of UOP. He is admitted to HTS team for further management.          * No surgery found *     Hospital Course: HM 3 6/23/22  for NICM speed 5100 with hx of substance abuse, medical noncompliance (was taken  off of milrinone earlier this year 2/2 noncompliance) and seizure disorder admitted with ADHF. Was diuresed with lasix drip which was weaned to IVP lasix with good response. Plan to increase torsemide to 80 BID from 60 BID for discharge. Jardiance was started for GDMT during this hospitalization.     -Last 2D Echo 8/26/24: LVEF 5-10%, LVEDD 7.2 cm  -Diuretic regimen:  torsemide 80 BID ( home dose 60 BID)  -GDMT - home dose of Eplerenone.  Jardiance started. Will titrate meds as outpatient as tolerated   Weight on discharge: 73.4 Kg          Goals of Care Treatment Preferences:  Code Status: Full Code    Health care agent: Niharika Kyle  Health care agent number: [phone]          What is most important right now is to focus on symptom/pain control, extending life as long as possible, even it it means sacrificing quality, curative/life-prolongation (regardless of treatment burdens).  Accordingly, we have decided that the best plan to meet the patient's goals includes continuing with treatment.      Consults (From admission, onward)          Status Ordering Provider     Inpatient consult to Registered Dietitian/Nutritionist  Once        Provider:  (Not yet assigned)    Completed OBI, KOYENMARY     Inpatient consult to Endocrinology  Once        Provider:  (Not yet assigned)    Completed OBICHARLES     Inpatient consult to Cardiology  Once        Provider:  (Not yet assigned)    Completed MARY BETH BERG            Significant Diagnostic Studies: Labs: All labs within the past 24 hours have been reviewed    Pending Diagnostic Studies:       None          Final Active Diagnoses:    Diagnosis Date Noted POA    PRINCIPAL PROBLEM:  Acute decompensated heart failure [I50.9] 04/21/2024 Yes    LVAD (left ventricular assist device) present [Z95.811] 06/30/2022 Not Applicable    Atrial flutter [I48.92] 08/27/2024 Yes    Hypokalemia [E87.6] 05/21/2024 Yes    HTN (hypertension) [I10] 04/21/2024 Yes    Type 2 diabetes mellitus  "with hyperglycemia, with long-term current use of insulin [E11.65, Z79.4] 04/21/2024 Not Applicable    Chronic anticoagulation [Z79.01] 01/03/2024 Not Applicable    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  Medications:  Reconciled Home Medications:      Medication List        START taking these medications      empagliflozin 10 mg tablet  Commonly known as: Jardiance  Take 1 tablet (10 mg total) by mouth once daily.  Start taking on: January 9, 2025            CHANGE how you take these medications      amLODIPine 10 MG tablet  Commonly known as: NORVASC  Take 1 tablet (10 mg total) by mouth once daily.  Start taking on: January 9, 2025  What changed:   medication strength  how much to take     insulin detemir U-100 (Levemir) 100 unit/mL (3 mL) Inpn pen  Inject 10 Units into the skin 2 (two) times daily. In the morning and before bed.  What changed: how much to take            CONTINUE taking these medications      acetaminophen 325 MG tablet  Commonly known as: TYLENOL  Take 2 tablets (650 mg total) by mouth every 6 (six) hours as needed for Pain.     amiodarone 200 MG Tab  Commonly known as: PACERONE  Take 1 tablet (200 mg total) by mouth once daily.     aspirin 81 MG EC tablet  Commonly known as: ECOTRIN  Take 1 tablet (81 mg total) by mouth once daily.     atorvastatin 40 MG tablet  Commonly known as: LIPITOR  Take 1 tablet (40 mg total) by mouth once daily.     BAQSIMI 3 mg/actuation Spry  Generic drug: glucagon  1 each by Nasal route as needed (hypoglycemia).     BD ULTRA-FINE GARTH PEN NEEDLE 32 gauge x 5/32" " Ndle  Generic drug: pen needle, diabetic  1 each by Misc.(Non-Drug; Combo Route) route 4 (four) times daily.     blood sugar diagnostic Strp  Use to test blood glucose 4 (four) times daily.     blood-glucose meter Misc  Commonly known as: TRUE METRIX GLUCOSE METER  Use to test blood glucose 4 (four) times daily.     cefadroxil 500 MG Cap  Commonly known as: DURICEF  Take 1 capsule (500 mg total) by mouth every 12 (twelve) hours.     DULoxetine 30 MG capsule  Commonly known as: CYMBALTA  Take 1 capsule (30 mg total) by mouth once daily.     eplerenone 25 MG Tab  Commonly known as: INSPRA  Take 2 tablets (50 mg total) by mouth once daily.     FREESTYLE LUCIUS 3 SENSOR Dee  Generic drug: blood-glucose sensor  1 Device by Misc.(Non-Drug; Combo Route) route every 14 (fourteen) days.     gabapentin 100 MG capsule  Commonly known as: NEURONTIN  Take 1 capsule (100 mg total) by mouth 2 (two) times daily AND 4 capsules (400 mg total) every evening.     insulin aspart U-100 100 unit/mL (3 mL) Inpn pen  Commonly known as: NovoLOG  Inject 18 Units into the skin 3 (three) times daily with meals: MAX 94 units/daily  150-200    201-250    251-300    301-350    >350  +2 units   +4 units    +6 units    +8 units    +10 units     lancets 33 gauge Misc  Use to test blood glucose 4 (four) times daily.     levETIRAcetam 1000 MG tablet  Commonly known as: KEPPRA  Take 1.5 tablets (1,500 mg total) by mouth 2 (two) times daily.     magnesium oxide 400 mg (241.3 mg magnesium) tablet  Commonly known as: MAG-OX  Take 1 tablet (400 mg total) by mouth once daily.     ondansetron 4 MG Tbdl  Commonly known as: ZOFRAN-ODT  Take 1 tablet (4 mg total) by mouth every 8 (eight) hours as needed (nausea).     pantoprazole 40 MG tablet  Commonly known as: PROTONIX  Take 1 tablet (40 mg total) by mouth once daily.     polyethylene glycol 17 gram Pwpk  Commonly known as: GLYCOLAX  Take 17 g by mouth once daily.     potassium chloride SA 10 MEQ  tablet  Commonly known as: K-DUR,KLOR-CON M  Take 3 tablets (30 mEq total) by mouth once daily.     senna-docusate 8.6-50 mg 8.6-50 mg per tablet  Commonly known as: PERICOLACE  Take 1 tablet by mouth daily as needed for Constipation.     torsemide 20 MG Tab  Commonly known as: DEMADEX  Take 3 tablets (60 mg total) by mouth 2 (two) times a day.     warfarin 3 MG tablet  Commonly known as: COUMADIN  Take 0.5 tablets (1.5 mg total) by mouth Daily.            STOP taking these medications      D5W PgBk 50 mL with ceFAZolin 2 gram SolR 6 g              Enrique Vazquez MD  Heart Transplant  [facility]

## 2025-01-08 NOTE — PROGRESS NOTES
"[facility] - [facility]  Adult Nutrition  Progress Note    SUMMARY       Recommendations    Continue Diabetic Cardiac (Low Na/Chol) Low Sodium diet as tolerated and clinically indicated.     Consider D/C Boost - pt meeting goal with meals.    RD to monitor weight, PO intake.    Goals: Meet % EEN/EPN  Nutrition Goal Status: goal met  Communication of RD Recs:  (POC)    Assessment and Plan    Nutrition Problem  Inadequate energy intake     Related to (etiology):   Decreased appetite, nausea     Signs and Symptoms (as evidenced by):   Estimated intake < estimated requirements      Interventions/Recommendations (treatment strategy):  --Continue Diabetic Cardiac (Low Na/Chol) Low Sodium diet as tolerated and clinically indicated   --Order Boost Plus TID   --Encourage good intakes   --Nursing: please continue to document % meal eaten on flowsheet   --RD to monitor weight, PO intake      Nutrition Diagnosis Status:   Resolved         Reason for Assessment    Reason For Assessment: RD follow-up  Diagnosis: cardiac disease (Acute decompensated heart failure)  General Information Comments: RD follow up. Pt out of room at visit. RN states pt in LVAD community meeting, reports pt is eating really well, unsure of Boost consumption. 100% of meals documented in flowsheet. No N/V or GI symptoms noted. Pt is discharging today.  Nutrition Discharge Planning: Adequate nutritional intake    Nutrition Related Social Determinants of Health: SDOH: Unable to assess at this time.     Nutrition/Diet History    Spiritual, Cultural Beliefs, Mosque Practices, Values that Affect Care: no  Food Allergies: NKFA  Factors Affecting Nutritional Intake: None identified at this time    Anthropometrics    Temp: 98.1 °F (36.7 °C)  Height Method: Stated  Height: 6' 3" (190.5 cm)  Height (inches): 75 in  Weight Method: Standard Scale  Weight:  (patient  refuse)  Weight (lb): 161.82 lb  Ideal Body Weight (IBW), Male: 196 lb  % Ideal Body " Weight, Male (lb): 81.32 %  BMI (Calculated): 20.2  BMI Grade: 18.5-24.9 - normal       Lab/Procedures/Meds    Pertinent Labs Reviewed: reviewed  Pertinent Labs Comments: Sodium 133, potassium 3.4, BUN 21, glucose 121, , albumin 2.5, prealbumin 9, CRP 16.1  Pertinent Medications Reviewed: reviewed  Pertinent Medications Comments: Amiodarone, aspirin, atorvastatin, jardiance, eplerenone, insulin, lactulose, polyethylene glycol, potassium chloride, senna docusate, warfarin      Estimated/Assessed Needs    Weight Used For Calorie Calculations: 72.3 kg (159 lb 6.3 oz)  Energy Calorie Requirements (kcal): 8721-4791 kcal/day (25-30 kcal/kg)  Energy Need Method: Kcal/kg  Protein Requirements: 72-97 g/day (1.0-1.2 g/kg)  Weight Used For Protein Calculations: 72.3 kg (159 lb 6.3 oz)     Estimated Fluid Requirement Method: RDA Method  RDA Method (mL): 1808  CHO Requirement: 226-271 g/day      Nutrition Prescription Ordered    Current Diet Order: Diabetic Cardiac (Low Na/Chol)  Oral Nutrition Supplement: Boost Plus BID    Evaluation of Received Nutrient/Fluid Intake    I/O: - 16,718 net I/O  Comments: LBM 1/6  % Intake of Estimated Energy Needs: 75 - 100 %  % Meal Intake: 75 - 100 %    Nutrition Risk    Level of Risk/Frequency of Follow-up: high     Monitor and Evaluation    Food and Nutrient Intake: energy intake, food and beverage intake  Food and Nutrient Adminstration: diet order  Knowledge/Beliefs/Attitudes: food and nutrition knowledge/skill  Physical Activity and Function: nutrition-related ADLs and IADLs  Anthropometric Measurements: weight, weight change, body mass index  Biochemical Data, Medical Tests and Procedures: electrolyte and renal panel, gastrointestinal profile, glucose/endocrine profile, inflammatory profile, lipid profile  Nutrition-Focused Physical Findings: overall appearance     Nutrition Follow-Up    RD Follow-up?: Yes    Lilian Valencia, Registration Eligible, Provisional LDN

## 2025-01-08 NOTE — PROGRESS NOTES
[facility]  Heart Transplant  Progress Note    Patient Name: Kevan Queen  MRN: [id number]  Admission Date: 12/31/2024  Hospital Length of Stay: 8 days  Attending Physician: Dalia Crum MD  Primary Care Provider: Rosio Armendariz FNP  Principal Problem:Acute decompensated heart failure    Subjective:   Interval History: No overnight events. Net negative 2632 mL in last 24 hours. Will stop lasix IVP 80 TID and switch to torsemide 80 BID for discharge - takes 60 BID at home - compliance stressed.    Anxiety much better.         Scheduled Meds:   amiodarone  200 mg Oral Daily    amLODIPine  10 mg Oral Daily    aspirin  81 mg Oral Daily    atorvastatin  40 mg Oral Daily    cefadroxil  500 mg Oral Q12H    DULoxetine  30 mg Oral Daily    empagliflozin  10 mg Oral Daily    eplerenone  50 mg Oral Daily    furosemide (LASIX) injection  80 mg Intravenous TID WAKE    gabapentin  100 mg Oral Daily    And    gabapentin  400 mg Oral QHS    insulin aspart U-100  6-10 Units Subcutaneous TIDWM    insulin glargine U-100  10 Units Subcutaneous Daily    lactulose  10 g Oral TID    levETIRAcetam  1,500 mg Oral BID    mupirocin   Nasal BID    polyethylene glycol  17 g Oral Daily    potassium chloride  40 mEq Oral BID    senna-docusate 8.6-50 mg  1 tablet Oral Daily    warfarin  1 mg Oral Every Mon, Fri    And    warfarin  1.5 mg Oral Once per day on Sunday Tuesday Wednesday Thursday Saturday     PRN Meds:  Current Facility-Administered Medications:     acetaminophen, 650 mg, Oral, Q6H PRN    dextrose 50%, 12.5 g, Intravenous, PRN    dextrose 50%, 25 g, Intravenous, PRN    glucagon (human recombinant), 1 mg, Intramuscular, PRN    glucose, 16 g, Oral, PRN    glucose, 24 g, Oral, PRN    influenza, 0.5 mL, Intramuscular, vaccine x 1 dose    insulin aspart U-100, 0-10 Units, Subcutaneous, QID (AC + HS) PRN    Review of patient's allergies indicates:   Allergen Reactions    Bumex [bumetanide] Hives      Objective:     Vital Signs (Most Recent):  Temp: 98.1 °F (36.7 °C) (01/08/25 1141)  Pulse: 94 (01/08/25 1141)  Resp: 18 (01/08/25 1141)  BP: (!) 82/0 (01/08/25 1145)  SpO2: 99 % (01/08/25 1141) Vital Signs (24h Range):  Temp:  [97.4 °F (36.3 °C)-98.1 °F (36.7 °C)] 98.1 °F (36.7 °C)  Pulse:  [62-99] 94  Resp:  [18-19] 18  SpO2:  [95 %-100 %] 99 %  BP: (81-90)/(0-66) 82/0     Patient Vitals for the past 72 hrs (Last 3 readings):   Weight   01/06/25 0422 73.4 kg (161 lb 13.1 oz)     Body mass index is 20.23 kg/m².      Intake/Output Summary (Last 24 hours) at 1/8/2025 1304  Last data filed at 1/8/2025 1124  Gross per 24 hour   Intake 878.01 ml   Output 3975 ml   Net -3096.99 ml       Hemodynamic Parameters:       Telemetry: reviewed        Physical Exam  Vitals and nursing note reviewed.   Constitutional:       Appearance: Normal appearance.   HENT:      Head: Normocephalic.      Mouth/Throat:      Mouth: Mucous membranes are moist.   Eyes:      Pupils: Pupils are equal, round, and reactive to light.   Neck:      Comments: JVP elevation to mid-upper neck   Cardiovascular:      Comments: Smooth VAD hum   Pulmonary:      Effort: Pulmonary effort is normal.   Abdominal:      General: Bowel sounds are normal. There is no distension.      Palpations: Abdomen is soft.   Musculoskeletal:         General: Normal range of motion.      Right lower leg: No edema.      Left lower leg: No edema.   Skin:     General: Skin is warm.      Capillary Refill: Capillary refill takes 2 to 3 seconds.   Neurological:      General: No focal deficit present.      Mental Status: He is alert and oriented to person, place, and time.   Psychiatric:         Mood and Affect: Mood normal.         Behavior: Behavior normal.            Significant Labs:  CBC:  Recent Labs   Lab 01/06/25  0336 01/07/25  0233 01/08/25  0413   WBC 11.55 9.46 10.56   RBC 4.66 4.72 4.64   HGB 8.3* 8.4* 8.3*   HCT 29.8* 29.9* 29.5*    420 433   MCV 64* 63* 64*  "  MCH 17.8* 17.8* 17.9*   MCHC 27.9* 28.1* 28.1*     BNP:  No results for input(s): "BNP" in the last 168 hours.    Invalid input(s): "BNPTRIAGELBLO"    CMP:  Recent Labs   Lab 01/05/25  1740 01/06/25  0336 01/06/25  1104 01/07/25  0233 01/07/25 2009 01/08/25  0413   *  --    < > 190* 144* 121*   CALCIUM 8.5*  --    < > 8.6* 8.5* 8.7   ALBUMIN 2.6* 2.6*  --   --   --  2.5*   PROT 8.8* 8.5*  --   --   --  8.4   *  --    < > 133* 136 133*   K 3.7  --    < > 3.9 3.7 3.4*   CO2 27  --    < > 24 25 23   CL 98  --    < > 99 100 100   BUN 17  --    < > 21* 20 21*   CREATININE 1.2  --    < > 1.2 1.4 1.3   ALKPHOS 153* 162*  --   --   --  165*   ALT 6* 6*  --   --   --  10   AST 23 27  --   --   --  32   BILITOT 1.7* 1.8*  --   --   --  1.4*    < > = values in this interval not displayed.      Coagulation:   Recent Labs   Lab 01/06/25 0336 01/07/25 0233 01/08/25 0413   INR 3.2* 2.4* 2.2*   APTT 33.7* 33.3* 35.3*     LDH:  Recent Labs   Lab 01/06/25  0336 01/07/25 0233 01/08/25  0413   * 263* 290*     Microbiology:  Microbiology Results (last 7 days)       ** No results found for the last 168 hours. **            I have reviewed all pertinent labs within the past 24 hours.    Estimated Creatinine Clearance: 79.2 mL/min (based on SCr of 1.3 mg/dL).    Diagnostic Results:  I have reviewed all pertinent imaging results/findings within the past 24 hours.  Assessment and Plan:     Kevan Queen is a 39 year old male with stage D 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 He underwent RVAD removal and chest closure 6/30/2022. He is off inotropes. He presents with one month hx of dyspnea and abdominal distention. Stated he takes lasix 80mg daily. He develops rash to " both torsemide and bumex. He reports yesterday he noticed bilateral LE edema which prompted him to come to the ED with the assistance of his family as they drove from [place]. He reports intermittent chest pain with coughing but not occurring at the moment. He denies any LVAD alarms. He reports eating salty foods yesterday.      On 11/15/23 underwent removal of eroded [employer] scICD--no Lifevest (due to LVAD) and no plan to replace ICD as not requiring therapy post LVAD with concern for reinfection.      Of note, he presented to [facility]  on 12/8 for dyspnea and hyperglycemia, felt better so was discharged.     Upon arrival to the ER today, he was hemodynamically stable. Labs demonstrated (hyponatremia Na 131), asiya Ca 9.5, Hgb 7.9 (at baseline), . He was given lasix IV 60mg and made about 300cc of UOP. He is admitted to HTS team for further management.          * Acute decompensated heart failure  -NICM  -Last 2D Echo 8/26/24: LVEF 5-10%, LVEDD 7.2 cm    -Current diuretic regimen: Switch Laisx IVP 80 TID to torsemide 80 BID ( home dose 60 BID)  -GDMT with home dose of Eplerenone.  Will add meds as outpatient as tolerated   -Patient is not currently being evaluated for OHTx due to non compliance  -2g Na dietary restriction, 1500 mL fluid restriction, strict I/Os      LVAD (left ventricular assist device) present  -HeartMate 3 Implanted 6/23/2022 with early RV failure requiring RVAD with ProTek Duo   -Continue Coumadin,  Goal INR 2.0-3.0 . Therapeutic today.  Previous home regimen 1.5mg Qdaily  -Antiplatelets ASA 81 mg, on hold with GIB  -LDH is stable overall today. Will continue to monitor daily.  -Speed set at 5100  -Interrogation notable for no events  -Not listed for OHTx      Procedure: Device Interrogation Including analysis of device parameters  Current Settings: Ventricular Assist Device  Review of device function is stable      1/8/2025    11:55 AM 1/8/2025     8:00 AM 1/8/2025      4:48 AM 1/7/2025    11:25 PM 1/7/2025     7:32 PM 1/7/2025     3:30 PM 1/7/2025    11:36 AM   TXP LVAD INTERROGATIONS   Type  HeartMate3  HeartMate3  HeartMate3 HeartMate3   Flow 4.3 4.1 4 3.9 4.2 3.8 4.1   Speed 5100 5100 5100 5100 5100 5100 5100   PI 4.2 4.2 4.9 5.2 4.1 5.7 4.7   Power (Serna) 3.7 3.6 3.6 3.6 3.6 3.6 3.6   LSL 4700 4700 4700 4700 4700 4700 4700   Pulsatility Intermittent pulse Intermittent pulse Intermittent pulse Intermittent pulse Intermittent pulse  Intermittent pulse         Atrial flutter  Continue home amiodarone and anticoagulation.    Type 2 diabetes mellitus with hyperglycemia, with long-term current use of insulin  A1c 10.   Resume insulin  Endocrinology consulted for BG management    HTN (hypertension)  Resume home antihypertensive - amlodipine and epleronone.    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     Tobacco use  Active tobacco user.   Patient declined nicotine patch.    Anemia of chronic disease  Hgb 7.9 at baseline. Will order iron panel.     Anxiety  Resume duloxetine.  Hydroxyzine PRN  One time xanax on 1/6        Enrique Vazquez MD  Heart Transplant  [facility]

## 2025-01-08 NOTE — SUBJECTIVE & OBJECTIVE
"Interval HPI:   Overnight events: No acute events overnight. Patient in room [id number] A. Blood glucose stable. BG at goal on current insulin regimen (SSI, prandial, and basal insulin ). Steroid use- None.    Renal function- Normal   Vasopressors-  None       Endocrine will continue to follow and manage insulin orders inpatient.         Diet diabetic Cardiac (Low Na/Chol), Low Sodium,2gm; 2000 Calories (up to 75 gm per meal); Fluid - 1500mL     Eatin%  Nausea: No  Hypoglycemia and intervention: No  Fever: No  TPN and/or TF: No      BP (!) 90/0 (BP Location: Right arm, Patient Position: Lying)   Pulse 82   Temp 97.7 °F (36.5 °C) (Oral)   Resp 18   Ht 6' 3" (1.905 m)   Wt 73.4 kg (161 lb 13.1 oz)   SpO2 95%   BMI 20.23 kg/m²     Labs Reviewed and Include    Recent Labs   Lab 25  0413   *   CALCIUM 8.7   ALBUMIN 2.5*   PROT 8.4   *   K 3.4*   CO2 23      BUN 21*   CREATININE 1.3   ALKPHOS 165*   ALT 10   AST 32   BILITOT 1.4*     Lab Results   Component Value Date    WBC 10.56 2025    HGB 8.3 (L) 2025    HCT 29.5 (L) 2025    MCV 64 (L) 2025     2025     No results for input(s): "TSH", "FREET4" in the last 168 hours.  Lab Results   Component Value Date    HGBA1C 10.6 (H) 2024       Nutritional status:   Body mass index is 20.23 kg/m².  Lab Results   Component Value Date    ALBUMIN 2.5 (L) 2025    ALBUMIN 2.6 (L) 2025    ALBUMIN 2.6 (L) 2025     Lab Results   Component Value Date    PREALBUMIN 9 (L) 2025    PREALBUMIN 9 (L) 2025    PREALBUMIN 8 (L) 2025       Estimated Creatinine Clearance: 79.2 mL/min (based on SCr of 1.3 mg/dL).    Accu-Checks  Recent Labs     2525  0821 25  1221 25  1635 25  1921 25  0821 25  1130 25  1640 25  1939 25  0828   POCTGLUCOSE 108 127* 117* 99 239* 147* 169* 116* 232* 125*       Current Medications " and/or Treatments Impacting Glycemic Control  Immunotherapy:    Immunosuppressants       None          Steroids:   Hormones (From admission, onward)      None          Pressors:    Autonomic Drugs (From admission, onward)      None          Hyperglycemia/Diabetes Medications:   Antihyperglycemics (From admission, onward)      Start     Stop Route Frequency Ordered    01/07/25 1200  empagliflozin (Jardiance) tablet 10 mg        Question Answer Comment   Does this patient have a diagnosis of heart failure? Yes    Does this patient have type 1 diabetes or diabetic ketoacidosis? No    Does this patient have symptomatic hypotension? No    Is the patient NPO or pending major surgery in next 3 days or less? No        -- Oral Daily 01/07/25 1054    01/06/25 1130  insulin aspart U-100 pen 6-10 Units         -- SubQ 3 times daily with meals 01/06/25 0901    01/01/25 0900  insulin glargine U-100 (Lantus) pen 10 Units         -- SubQ Daily 01/01/25 0813    12/31/24 1525  insulin aspart U-100 pen 0-10 Units         -- SubQ Before meals & nightly PRN 12/31/24 1421

## 2025-01-08 NOTE — PROGRESS NOTES
01/08/2025  John Alaniz    Current provider:  Dalia Crum MD    Device interrogation:      1/8/2025    11:55 AM 1/8/2025     8:00 AM 1/8/2025     4:48 AM 1/7/2025    11:25 PM 1/7/2025     7:32 PM 1/7/2025     3:30 PM 1/7/2025    11:36 AM   TXP LVAD INTERROGATIONS   Type  HeartMate3  HeartMate3  HeartMate3 HeartMate3   Flow 4.3 4.1 4 3.9 4.2 3.8 4.1   Speed 5100 5100 5100 5100 5100 5100 5100   PI 4.2 4.2 4.9 5.2 4.1 5.7 4.7   Power (Serna) 3.7 3.6 3.6 3.6 3.6 3.6 3.6   LSL 4700 4700 4700 4700 4700 4700 4700   Pulsatility Intermittent pulse Intermittent pulse Intermittent pulse Intermittent pulse Intermittent pulse  Intermittent pulse          Rounded on Kevan Queen to ensure all mechanical assist device settings (IABP or VAD) were appropriate and all parameters were within limits.  I was able to ensure all back up equipment was present, the staff had no issues, and the Perfusion Department daily rounding was complete.      For implantable VADs: Interrogation of Ventricular assist device was performed with analysis of device parameters and review of device function. I have personally reviewed the interrogation findings and agree with findings as stated.     In emergency, the nursing units have been notified to contact the perfusion department either by:  Calling [phone] from 630am to 4pm Mon thru Fri, utilizing the On-Call Finder functionality of Epic and searching for Perfusion, or by contacting the hospital  from 4pm to 630am and on weekends and asking to speak with the perfusionist on call.    2:14 PM

## 2025-01-08 NOTE — PLAN OF CARE
Recommendations    Continue Diabetic Cardiac (Low Na/Chol) Low Sodium diet as tolerated and clinically indicated.     Consider D/C Boost - pt meeting goal with meals.    RD to monitor weight, PO intake.    Goals: Meet % EEN/EPN  Nutrition Goal Status: goal met  Communication of RD Recs:  (POC)

## 2025-01-09 ENCOUNTER — PATIENT MESSAGE (OUTPATIENT)
Dept: ENDOCRINOLOGY | Facility: HOSPITAL | Age: 40
End: 2025-01-09
Payer: MEDICAID

## 2025-01-09 NOTE — NURSING
20:00 Patient for discharge tonight, patient waiting for a ride.   Noted IV lasix still ordered - MD Yung contacted to clarify, ordered to give the prescribed dose tonight before discharge.   Equipment Inventory for Discharge done - New Battery numbers documented.   VAD dressing changed.  Discharge letter given to patient and explained. Question answered.       21:26 Patient still waiting for his ride, coming from [facility]. Charge nurse made aware.      Repeat K- 3.6 - MD made aware. Scheduled K supplement given.    22:17 IV line removed. Tele box removed. Patient stated his ride will arrive soon.     Patient family arrived. Patient wheeled down to the garage.

## 2025-01-10 NOTE — PHYSICIAN QUERY
Please specify the diagnosis associated with the clinical findings.  Acute on chronic systolic heart failure (HFrEF or HFmrEF)

## 2025-01-14 ENCOUNTER — HOSPITAL ENCOUNTER (EMERGENCY)
Facility: HOSPITAL | Age: 40
Discharge: ANOTHER HOSPITAL | End: 2025-01-15
Attending: STUDENT IN AN ORGANIZED HEALTH CARE EDUCATION/TRAINING PROGRAM
Payer: MEDICAID

## 2025-01-14 DIAGNOSIS — R06.02 SOB (SHORTNESS OF BREATH): ICD-10-CM

## 2025-01-14 LAB
ABS NEUT (OLG): 25.85 X10(3)/MCL (ref 2.1–9.2)
ACANTHOCYTES (OLG): ABNORMAL
ALBUMIN SERPL-MCNC: 2.6 G/DL (ref 3.5–5)
ALBUMIN/GLOB SERPL: 0.4 RATIO (ref 1.1–2)
ALLENS TEST BLOOD GAS (OHS): ABNORMAL
ALP SERPL-CCNC: 191 UNIT/L (ref 40–150)
ALT SERPL-CCNC: 12 UNIT/L (ref 0–55)
ANION GAP SERPL CALC-SCNC: 13 MEQ/L
ANISOCYTOSIS BLD QL SMEAR: ABNORMAL
AST SERPL-CCNC: 32 UNIT/L (ref 5–34)
BACTERIA #/AREA URNS AUTO: ABNORMAL /HPF
BASE EXCESS BLD CALC-SCNC: 2.4 MMOL/L (ref -2–2)
BILIRUB SERPL-MCNC: 2.2 MG/DL
BILIRUB UR QL STRIP.AUTO: NEGATIVE
BLOOD GAS SAMPLE TYPE (OHS): ABNORMAL
BNP BLD-MCNC: 1009.6 PG/ML
BUN SERPL-MCNC: 17.7 MG/DL (ref 8.9–20.6)
CA-I BLD-SCNC: 1.1 MMOL/L (ref 1.12–1.23)
CALCIUM SERPL-MCNC: 8.7 MG/DL (ref 8.4–10.2)
CHLORIDE SERPL-SCNC: 102 MMOL/L (ref 98–107)
CLARITY UR: CLEAR
CO2 BLDA-SCNC: 27.3 MMOL/L
CO2 SERPL-SCNC: 22 MMOL/L (ref 22–29)
COHGB MFR BLDA: 3.6 % (ref 0.5–1.5)
COLOR UR AUTO: YELLOW
CREAT SERPL-MCNC: 1.61 MG/DL (ref 0.72–1.25)
CREAT/UREA NIT SERPL: 11
DRAWN BY BLOOD GAS (OHS): ABNORMAL
ERYTHROCYTE [DISTWIDTH] IN BLOOD BY AUTOMATED COUNT: 23.9 % (ref 11.5–17)
FLUAV AG UPPER RESP QL IA.RAPID: NOT DETECTED
FLUBV AG UPPER RESP QL IA.RAPID: NOT DETECTED
GFR SERPLBLD CREATININE-BSD FMLA CKD-EPI: 55 ML/MIN/1.73/M2
GLOBULIN SER-MCNC: 6.1 GM/DL (ref 2.4–3.5)
GLUCOSE SERPL-MCNC: 167 MG/DL (ref 74–100)
GLUCOSE UR QL STRIP: ABNORMAL
HCO3 BLDA-SCNC: 26.2 MMOL/L (ref 22–26)
HCT VFR BLD AUTO: 31 % (ref 42–52)
HGB BLD-MCNC: 8.8 G/DL (ref 14–18)
HGB UR QL STRIP: ABNORMAL
HYPOCHROMIA BLD QL SMEAR: ABNORMAL
INR PPP: 2.5
INSTRUMENT WBC (OLG): 27.21 X10(3)/MCL
KETONES UR QL STRIP: NEGATIVE
LACTATE SERPL-SCNC: 2 MMOL/L (ref 0.5–2.2)
LACTATE SERPL-SCNC: 3 MMOL/L (ref 0.5–2.2)
LEUKOCYTE ESTERASE UR QL STRIP: NEGATIVE
LPM (OHS): 15
LYMPHOCYTES NFR BLD MANUAL: 0.82 X10(3)/MCL
LYMPHOCYTES NFR BLD MANUAL: 3 %
MACROCYTES BLD QL SMEAR: ABNORMAL
MCH RBC QN AUTO: 18.1 PG (ref 27–31)
MCHC RBC AUTO-ENTMCNC: 28.4 G/DL (ref 33–36)
MCV RBC AUTO: 63.9 FL (ref 80–94)
METHGB MFR BLDA: 1.1 % (ref 0.4–1.5)
MONOCYTES NFR BLD MANUAL: 0.82 X10(3)/MCL (ref 0.1–1.3)
MONOCYTES NFR BLD MANUAL: 3 %
NEUTROPHILS NFR BLD MANUAL: 95 %
NITRITE UR QL STRIP: NEGATIVE
NRBC BLD AUTO-RTO: 0.4 %
O2 HB BLOOD GAS (OHS): 95.7 % (ref 94–97)
OHS QRS DURATION: 100 MS
OHS QTC CALCULATION: 535 MS
OXYGEN DEVICE BLOOD GAS (OHS): ABNORMAL
OXYHGB MFR BLDA: 8.3 G/DL (ref 12–16)
PCO2 BLDA: 36 MMHG (ref 35–45)
PH BLDA: 7.47 [PH] (ref 7.35–7.45)
PH UR STRIP: 6 [PH]
PLATELET # BLD AUTO: 262 X10(3)/MCL (ref 130–400)
PLATELET # BLD EST: NORMAL 10*3/UL
PLATELETS.RETICULATED NFR BLD AUTO: 3 % (ref 0.9–11.2)
PMV BLD AUTO: 8.9 FL (ref 7.4–10.4)
PO2 BLDA: 179 MMHG (ref 80–100)
POIKILOCYTOSIS BLD QL SMEAR: ABNORMAL
POLYCHROMASIA BLD QL SMEAR: ABNORMAL
POTASSIUM BLOOD GAS (OHS): 3.4 MMOL/L (ref 3.5–5)
POTASSIUM SERPL-SCNC: 3.5 MMOL/L (ref 3.5–5.1)
PROT SERPL-MCNC: 8.7 GM/DL (ref 6.4–8.3)
PROT UR QL STRIP: ABNORMAL
PROTHROMBIN TIME: 27.5 SECONDS (ref 12.5–14.5)
RBC # BLD AUTO: 4.85 X10(6)/MCL (ref 4.7–6.1)
RBC #/AREA URNS AUTO: ABNORMAL /HPF
RBC MORPH BLD: ABNORMAL
SAMPLE SITE BLOOD GAS (OHS): ABNORMAL
SAO2 % BLDA: 99.7 %
SARS-COV-2 RNA RESP QL NAA+PROBE: NOT DETECTED
SODIUM BLOOD GAS (OHS): 129 MMOL/L (ref 137–145)
SODIUM SERPL-SCNC: 137 MMOL/L (ref 136–145)
SP GR UR STRIP.AUTO: 1.03 (ref 1–1.03)
SQUAMOUS #/AREA URNS LPF: ABNORMAL /HPF
TARGETS BLD QL SMEAR: ABNORMAL
TROPONIN I SERPL-MCNC: 0.09 NG/ML (ref 0–0.04)
UROBILINOGEN UR STRIP-ACNC: 2
WBC # BLD AUTO: 27.21 X10(3)/MCL (ref 4.5–11.5)
WBC #/AREA URNS AUTO: ABNORMAL /HPF

## 2025-01-14 PROCEDURE — 83880 ASSAY OF NATRIURETIC PEPTIDE: CPT | Performed by: STUDENT IN AN ORGANIZED HEALTH CARE EDUCATION/TRAINING PROGRAM

## 2025-01-14 PROCEDURE — 25500020 PHARM REV CODE 255: Performed by: STUDENT IN AN ORGANIZED HEALTH CARE EDUCATION/TRAINING PROGRAM

## 2025-01-14 PROCEDURE — 83605 ASSAY OF LACTIC ACID: CPT | Performed by: STUDENT IN AN ORGANIZED HEALTH CARE EDUCATION/TRAINING PROGRAM

## 2025-01-14 PROCEDURE — 96368 THER/DIAG CONCURRENT INF: CPT

## 2025-01-14 PROCEDURE — 85610 PROTHROMBIN TIME: CPT | Performed by: STUDENT IN AN ORGANIZED HEALTH CARE EDUCATION/TRAINING PROGRAM

## 2025-01-14 PROCEDURE — 81001 URINALYSIS AUTO W/SCOPE: CPT | Performed by: STUDENT IN AN ORGANIZED HEALTH CARE EDUCATION/TRAINING PROGRAM

## 2025-01-14 PROCEDURE — 63600175 PHARM REV CODE 636 W HCPCS: Performed by: STUDENT IN AN ORGANIZED HEALTH CARE EDUCATION/TRAINING PROGRAM

## 2025-01-14 PROCEDURE — 96365 THER/PROPH/DIAG IV INF INIT: CPT | Mod: 59

## 2025-01-14 PROCEDURE — 87077 CULTURE AEROBIC IDENTIFY: CPT | Performed by: STUDENT IN AN ORGANIZED HEALTH CARE EDUCATION/TRAINING PROGRAM

## 2025-01-14 PROCEDURE — 87154 CUL TYP ID BLD PTHGN 6+ TRGT: CPT | Performed by: STUDENT IN AN ORGANIZED HEALTH CARE EDUCATION/TRAINING PROGRAM

## 2025-01-14 PROCEDURE — 93005 ELECTROCARDIOGRAM TRACING: CPT

## 2025-01-14 PROCEDURE — 93010 ELECTROCARDIOGRAM REPORT: CPT | Mod: ,,, | Performed by: STUDENT IN AN ORGANIZED HEALTH CARE EDUCATION/TRAINING PROGRAM

## 2025-01-14 PROCEDURE — 84484 ASSAY OF TROPONIN QUANT: CPT | Performed by: STUDENT IN AN ORGANIZED HEALTH CARE EDUCATION/TRAINING PROGRAM

## 2025-01-14 PROCEDURE — 85025 COMPLETE CBC W/AUTO DIFF WBC: CPT | Performed by: STUDENT IN AN ORGANIZED HEALTH CARE EDUCATION/TRAINING PROGRAM

## 2025-01-14 PROCEDURE — 25000003 PHARM REV CODE 250: Performed by: STUDENT IN AN ORGANIZED HEALTH CARE EDUCATION/TRAINING PROGRAM

## 2025-01-14 PROCEDURE — 80053 COMPREHEN METABOLIC PANEL: CPT | Performed by: STUDENT IN AN ORGANIZED HEALTH CARE EDUCATION/TRAINING PROGRAM

## 2025-01-14 PROCEDURE — 99291 CRITICAL CARE FIRST HOUR: CPT

## 2025-01-14 PROCEDURE — 82803 BLOOD GASES ANY COMBINATION: CPT

## 2025-01-14 PROCEDURE — 96366 THER/PROPH/DIAG IV INF ADDON: CPT

## 2025-01-14 PROCEDURE — 36600 WITHDRAWAL OF ARTERIAL BLOOD: CPT

## 2025-01-14 PROCEDURE — 99900035 HC TECH TIME PER 15 MIN (STAT)

## 2025-01-14 PROCEDURE — 0240U COVID/FLU A&B PCR: CPT | Performed by: STUDENT IN AN ORGANIZED HEALTH CARE EDUCATION/TRAINING PROGRAM

## 2025-01-14 RX ORDER — MUPIROCIN 20 MG/G
OINTMENT TOPICAL 2 TIMES DAILY
Status: DISCONTINUED | OUTPATIENT
Start: 2025-01-15 | End: 2025-01-15 | Stop reason: HOSPADM

## 2025-01-14 RX ADMIN — IOHEXOL 100 ML: 350 INJECTION, SOLUTION INTRAVENOUS at 05:01

## 2025-01-14 RX ADMIN — VANCOMYCIN HYDROCHLORIDE 1000 MG: 1 INJECTION, POWDER, LYOPHILIZED, FOR SOLUTION INTRAVENOUS at 09:01

## 2025-01-14 RX ADMIN — PIPERACILLIN AND TAZOBACTAM 4.5 G: 4; .5 INJECTION, POWDER, LYOPHILIZED, FOR SOLUTION INTRAVENOUS; PARENTERAL at 04:01

## 2025-01-14 RX ADMIN — VANCOMYCIN HYDROCHLORIDE 750 MG: 750 INJECTION, POWDER, LYOPHILIZED, FOR SOLUTION INTRAVENOUS at 07:01

## 2025-01-14 RX ADMIN — PIPERACILLIN SODIUM AND TAZOBACTAM SODIUM 4.5 G: 4; .5 INJECTION, POWDER, LYOPHILIZED, FOR SOLUTION INTRAVENOUS at 09:01

## 2025-01-14 RX ADMIN — VANCOMYCIN HYDROCHLORIDE 1000 MG: 1 INJECTION, POWDER, LYOPHILIZED, FOR SOLUTION INTRAVENOUS at 05:01

## 2025-01-14 RX ADMIN — SODIUM CHLORIDE, SODIUM LACTATE, POTASSIUM CHLORIDE, AND CALCIUM CHLORIDE 500 ML: .6; .31; .03; .02 INJECTION, SOLUTION INTRAVENOUS at 05:01

## 2025-01-14 NOTE — ED PROVIDER NOTES
Encounter Date: 1/14/2025    SCRIBE #1 NOTE: I, Tara Roblero, am scribing for, and in the presence of,  Eron Lees MD. I have scribed the following portions of the note - Other sections scribed: HPI, ROS, PE.       History     Chief Complaint   Patient presents with    Nausea     Pt arrives via [facility], EMS / Pt reports nausea,vomiting, and SOB starting tonight after eating pizza. Pt is an LVAD pt, BP MAP of 70 in triage. Pt breathing is labored. EMS CBG is 135     Patient is a 39-year-old male with a history of stage D CHF s/p LVAD placement and IDDM, on coumadin, presenting to the ED with c/o nausea. The pt reports abdominal pain with associated nausea, vomiting, and shortness of breath onset tonight after eating pizza. The pt denies any fever.     The history is provided by the patient. No  was used.     Review of patient's allergies indicates:   Allergen Reactions    Bumex [bumetanide] Hives     Past Medical History:   Diagnosis Date    Acute respiratory failure with hypoxia 07/22/2023    Arthritis     Awareness alteration, transient 09/01/2022    Cardiomyopathy     CHF (congestive heart failure) 10/01/2020    COVID-19 06/03/2023    Diabetes mellitus     Dilated cardiomyopathy 10/26/2020    Drug abuse 10/2020    Headache 04/19/2023    Hyperglycemia 12/16/2022    Hyperosmolar hyperglycemic state (HHS) 05/25/2022    ICD (implantable cardioverter-defibrillator) in place 10/26/2020    Infected defibrillator now s/p removal Nov 2023 07/23/2023    Left ventricular assist device (LVAD) complication, initial encounter 06/05/2023    Muscle cramping 06/15/2022    Renal disorder     SOB (shortness of breath) 06/13/2022    Tingling in extremities 07/13/2022     Past Surgical History:   Procedure Laterality Date    APPLICATION OF WOUND VACUUM-ASSISTED CLOSURE DEVICE N/A 6/30/2022    Procedure: APPLICATION, WOUND VAC;  Surgeon: Luis F Paige MD;  Location: [facility] OR [address] [place] [address];  Service: Cardiovascular;   Laterality: N/A;  50 x 5 cm    CARDIAC DEFIBRILLATOR PLACEMENT      COLONOSCOPY N/A 9/25/2024    Procedure: COLONOSCOPY;  Surgeon: Drake Barton MD;  Location: [facility] ENDO (2ND FLR);  Service: Endoscopy;  Laterality: N/A;    ECHOCARDIOGRAM,TRANSESOPHAGEAL  11/9/2023    Procedure: Transesophageal echo (GUILLERMO) intra-procedure log documentation;  Surgeon: Eron Ivy MD;  Location: [facility] EP LAB;  Service: Cardiology;;    ESOPHAGOGASTRODUODENOSCOPY N/A 9/25/2024    Procedure: EGD (ESOPHAGOGASTRODUODENOSCOPY);  Surgeon: Drake Barton MD;  Location: [facility] ENDO (2ND FLR);  Service: Endoscopy;  Laterality: N/A;    EXTRACTION, ELECTRODE LEAD Left 11/9/2023    Procedure: EXTRACTION, ELECTRODE LEAD;  Surgeon: ADALID Leon MD;  Location: [facility] EP LAB;  Service: Cardiology;  Laterality: Left;  INFECTION, LEAD EXTRACTION, GUILLERMO, BSCI, ANES, EH,   *NO CTS BACKUP*    IMPLANTATION OF RIGHT VENTRICULAR ASSIST DEVICE (RVAD) N/A 6/29/2022    Procedure: INSERTION, RVAD;  Surgeon: Luis F Paige MD;  Location: [facility] OR 2ND FLR;  Service: Cardiovascular;  Laterality: N/A;    INSERTION OF GRAFT TO PERICARDIUM Right 6/30/2022    Procedure: INSERTION-RIGHT VENTRICULAR ASSIST DEVICE;  Surgeon: Luis F Paige MD;  Location: [facility] OR 2ND FLR;  Service: Cardiovascular;  Laterality: Right;    IRRIGATION OF MEDIASTINUM  6/30/2022    Procedure: IRRIGATION, MEDIASTINUM;  Surgeon: Luis F Paige MD;  Location: [facility] OR 2ND FLR;  Service: Cardiovascular;;    LEFT VENTRICULAR ASSIST DEVICE Left 6/23/2022    Procedure: INSERTION-LEFT VENTRICULAR ASSIST DEVICE;  Surgeon: Luis F Paige MD;  [facility];  Service: Cardiovascular;  Laterality: Left;    LEFT VENTRICULAR ASSIST DEVICE N/A 6/29/2022    Procedure: INSERTION-LEFT VENTRICULAR ASSIST DEVICE;  Surgeon: Luis F Paige MD;  Location: [facility] OR 2ND FLR;  Service: Cardiovascular;  Laterality: N/A;    REVISION OF SKIN POCKET FOR CARDIOVERTER-DEFIBRILLATOR  11/9/2023     Procedure: REVISION, SKIN POCKET, FOR CARDIOVERTER-DEFIBRILLATOR;  Surgeon: ADALID eLon MD;  Location: [facility] EP LAB;  Service: Cardiology;;    RIGHT HEART CATHETERIZATION Right 4/8/2022    Procedure: INSERTION, CATHETER, RIGHT HEART;  Surgeon: Luca Lopez Jr., MD;  Location: [facility] CATH LAB;  Service: Cardiology;  Laterality: Right;    RIGHT HEART CATHETERIZATION Right 4/19/2022    Procedure: INSERTION, CATHETER, RIGHT HEART;  Surgeon: Josh Pulido MD;  Location: [facility] CATH LAB;  Service: Cardiology;  Laterality: Right;    RIGHT HEART CATHETERIZATION Right 7/21/2022    Procedure: INSERTION, CATHETER, RIGHT HEART;  Surgeon: Dalia Crum MD;  Location: [facility] CATH LAB;  Service: Cardiology;  Laterality: Right;    RIGHT HEART CATHETERIZATION Right 10/31/2022    Procedure: INSERTION, CATHETER, RIGHT HEART;  Surgeon: Dalia Crum MD;  Location: [facility] CATH LAB;  Service: Cardiology;  Laterality: Right;    STERNAL WOUND CLOSURE N/A 6/30/2022    Procedure: CLOSURE, WOUND, STERNUM;  Surgeon: Luis F Paige MD;  Location: [facility] OR [facility] FLR;  Service: Cardiovascular;  Laterality: N/A;     Family History   Problem Relation Name Age of Onset    Heart failure Father      Diverticulosis Brother      Heart attack Maternal Grandmother      Heart attack Maternal Grandfather       Social History     Tobacco Use    Smoking status: Former     Current packs/day: 0.00     Average packs/day: 0.5 packs/day for 16.6 years (8.3 ttl pk-yrs)     Types: Cigarettes     Start date: 10/1/2004     Quit date: 4/23/2021     Years since quitting: 3.7    Smokeless tobacco: Never   Substance Use Topics    Alcohol use: Not Currently    Drug use: Not Currently     Types: Marijuana, MDMA (Ecstacy)     Review of Systems   Constitutional:  Negative for chills and fever.   HENT:  Negative for drooling and sore throat.    Eyes:  Negative for pain and redness.   Respiratory:  Positive for shortness of breath. Negative for wheezing and  stridor.    Cardiovascular:  Negative for chest pain, palpitations and leg swelling.   Gastrointestinal:  Positive for abdominal pain, nausea and vomiting.   Genitourinary:  Negative for dysuria and hematuria.   Musculoskeletal:  Negative for back pain, neck pain and neck stiffness.   Skin:  Negative for rash and wound.   Neurological:  Negative for weakness and numbness.   Hematological:  Does not bruise/bleed easily.       Physical Exam     Initial Vitals [01/14/25 0233]   BP Pulse Resp Temp SpO2   -- (!) 112 (!) 28 98.4 °F (36.9 °C) 100 %      MAP       --         Physical Exam    Nursing note and vitals reviewed.  Constitutional: He appears well-developed.   The pt is ill-appearing.   HENT: Mouth/Throat: Mucous membranes are dry.   Eyes: EOM are normal. Pupils are equal, round, and reactive to light.   Cardiovascular:  Normal rate and regular rhythm.           No murmur heard.  LVAD in place.  LVAD mechanical hum on auscultation.   Pulmonary/Chest: No respiratory distress. He has no wheezes. He has rales.   Abdominal: Abdomen is soft. He exhibits distension (mild). There is generalized abdominal tenderness.     Neurological: He is alert and oriented to person, place, and time. GCS score is 15. GCS eye subscore is 4. GCS verbal subscore is 5. GCS motor subscore is 6.   Skin: Skin is warm. Capillary refill takes less than 2 seconds. No rash noted.         ED Course   Critical Care    Date/Time: 1/14/2025 7:23 AM    Performed by: Eron Lees MD  Authorized by: Eron Lees MD  Direct patient critical care time: 45 minutes  Total critical care time (exclusive of procedural time) : 45 minutes  Critical care time was exclusive of separately billable procedures and treating other patients.  Critical care was necessary to treat or prevent imminent or life-threatening deterioration of the following conditions: cardiac failure and sepsis.  Critical care was time spent personally by me on the following  activities: development of treatment plan with patient or surrogate, discussions with consultants, evaluation of patient's response to treatment, examination of patient, obtaining history from patient or surrogate, ordering and performing treatments and interventions, ordering and review of laboratory studies, ordering and review of radiographic studies, pulse oximetry, re-evaluation of patient's condition and review of old charts.        Labs Reviewed   COMPREHENSIVE METABOLIC PANEL - Abnormal       Result Value    Sodium 137      Potassium 3.5      Chloride 102      CO2 22      Glucose 167 (*)     Blood Urea Nitrogen 17.7      Creatinine 1.61 (*)     Calcium 8.7      Protein Total 8.7 (*)     Albumin 2.6 (*)     Globulin 6.1 (*)     Albumin/Globulin Ratio 0.4 (*)     Bilirubin Total 2.2 (*)      (*)     ALT 12      AST 32      eGFR 55      Anion Gap 13.0      BUN/Creatinine Ratio 11     TROPONIN I - Abnormal    Troponin-I 0.092 (*)    B-TYPE NATRIURETIC PEPTIDE - Abnormal    Natriuretic Peptide 1,009.6 (*)    CBC WITH DIFFERENTIAL - Abnormal    WBC 27.21 (*)     RBC 4.85      Hgb 8.8 (*)     Hct 31.0 (*)     MCV 63.9 (*)     MCH 18.1 (*)     MCHC 28.4 (*)     RDW 23.9 (*)     Platelet 262      MPV 8.9      IPF 3.0      NRBC% 0.4     MANUAL DIFFERENTIAL - Abnormal    WBC 27.21      Neutrophils % 95      Lymphs % 3      Monocytes % 3      Neutrophils Abs 25.8495 (*)     Lymphs Abs 0.8163      Monocytes Abs 0.8163      Platelets Normal      RBC Morph Abnormal (*)     Polychromasia 1+ (*)     Poikilocytosis 1+ (*)     Anisocytosis 1+ (*)     Macrocytosis 1+ (*)     Hypochromasia 1+ (*)     Acanthocytes 1+ (*)     Target Cells 2+ (*)    LACTIC ACID, PLASMA - Abnormal    Lactic Acid Level 3.0 (*)    COVID/FLU A&B PCR - Normal    Influenza A PCR Not Detected      Influenza B PCR Not Detected      SARS-CoV-2 PCR Not Detected      Narrative:     The Xpert Xpress SARS-CoV-2/FLU/RSV plus is a rapid, multiplexed  real-time PCR test intended for the simultaneous qualitative detection and differentiation of SARS-CoV-2, Influenza A, Influenza B, and respiratory syncytial virus (RSV) viral RNA in either nasopharyngeal swab or nasal swab specimens.         BLOOD CULTURE [facility]   BLOOD CULTURE [facility]   CBC W/ AUTO DIFFERENTIAL    Narrative:     The following orders were created for panel order CBC auto differential.  Procedure                               Abnormality         Status                     ---------                               -----------         ------                     CBC with Differential[[id number]]       Abnormal            Final result               Manual Differential[[id number]]         Abnormal            Final result                 Please view results for these tests on the individual orders.   URINALYSIS, REFLEX TO URINE CULTURE   LACTIC ACID, PLASMA          Imaging Results              CT Chest Abdomen Pelvis With IV Contrast (XPD) NO Oral Contrast (Preliminary result)  Result time 01/14/25 05:49:21      Preliminary result by Marco Guerra MD (01/14/25 05:49:21)                   Narrative:    START OF REPORT:  Technique: CT Scan of the chest abdomen and pelvis was performed with intravenous contrast with axial as well as sagittal and, coronal images.    Dosage Information: Automated Exposure Control was utilized 398.16 mGy.cm.    Comparison: Comparison is with study dated 2025-01-14 04:54:20.    Clinical History: Sepsis.    Findings:  Soft Tissues: Unremarkable.  Lines and Tubes: None.  Neck: The visualized soft tissues of the neck appear unremarkable. The thyroid gland appear unremarkable.  Mediastinum: The mediastinal structures are within normal limits.  Heart: Severe cardiomegaly is seen. The patient is status post median sternotomy. Postsurgical LVAD changes are observed.  Aorta: Unremarkable appearing aorta. No aortic dissection or aneurysm is seen.  Pulmonary Arteries: No filling defects are  seen in the pulmonary arteries to suggest pulmonary embolus to the sensitivity of the study.  Lungs: There is mild non specific dependent change at the lung bases. There is a small loculated pleural effusion in the left upper hemithorax with associated perifissural seepage and atelectasis of the adjacent lung parenchyma. No focal infiltrate or consolidation is seen.  Bony Structures:  Spine: The visualized dorsal spine appears unremarkable.  Ribs: No rib fractures are identified. The bilateral ribs appear unremarkable.  Abdomen: There is interval increase in pronounced stranding throughout the subcutaneous fat of the chest and abominopelvic wall. There is also interval increase in profound ascites and fat stranding throughout the abdominal and pelvis consistent with severe anasarca edema. This significantly decreases sensitivity and specificity for intra-abdominal and pelvic solid organ and bowel inflammatory and infectious processes.  Liver: The liver remains enlarged and shows heterogeneous contrast enhancement with a mottled appearance in severely enlarged intrahepatic vascular structures consistent with an underlying hepatocellular disease process.  Biliary System: No intrahepatic or extrahepatic biliary duct dilatation is seen.  Gallbladder: The gallbladder appears unremarkable with no stones wall thickening or pericholecystic inflammatory changes or fluid.  Pancreas: The pancreas appears unremarkable.  Spleen: The spleen appears unremarkable.  Adrenals: The adrenal glands appear unremarkable.  Kidneys: A single cyst measuring 9.1 mm is seen on Image 141, Series 2 in the mid pole of the left kidney. The left kidney otherwise appears unremarkable with no stones masses or hydronephrosis identified. There is a stable focal cortical defect in the interpolar region of the right kidney, likely a cortical scar (Series 2, Image 146). The right kidney otherwise appears unremarkable with no stones cysts masses or  hydronephrosis identified.  Aorta: There is minimal calcification of the abdominal aorta and its branches.  IVC: Unremarkable.  Bowel:  Esophagus: The visualized distal esophagus appears unremarkable.  Stomach: The stomach appears unremarkable.  Duodenum: Unremarkable appearing duodenum.  Small Bowel: The small bowel appears unremarkable.  Colon: Nondistended.  Appendix: The appendix is not identified but no inflammatory changes are seen in the right lower quadrant to suggest appendicitis.  Peritoneum: No free intraperitoneal air is seen. Again noted is severe abdominal and pelvic ascites.    Pelvis:  Bladder: The bladder appears unremarkable.  Male:  Prostate gland: The prostate gland appears unremarkable.    Bony structures:  Dorsal Spine: There is mild spondylosis of the visualized dorsal spine.  Bony Pelvis: The visualized bony structures of the pelvis appear unremarkable.      Impression:  1. There is a small loculated pleural effusion in the left upper hemithorax with associated perifissural seepage and atelectasis of the adjacent lung parenchyma. No focal infiltrate or consolidation is seen.  2. There is interval increase in pronounced stranding throughout the subcutaneous fat of the chest and abominopelvic wall. There is also interval increase in profound ascites and fat stranding throughout the abdominal and pelvis consistent with severe anasarca edema. This significantly decreases sensitivity and specificity for intra-abdominal and pelvic solid organ and bowel inflammatory and infectious processes. Correlate with clinical findings as regards additional evaluation and follow-up.  3. The liver remains enlarged and shows heterogeneous contrast enhancement with a mottled appearance in severely enlarged intrahepatic vascular structures consistent with an underlying hepatocellular disease process. Correlate with clinical and laboratory findings as regards additional evaluation and follow-up.  4. Details and  findings as discussed above.                                         X-Ray Chest AP (In process)                      Medications   vancomycin (VANCOCIN) 1,000 mg in D5W 250 mL IVPB (admixture device) (1,000 mg Intravenous New Bag 1/14/25 6001)     And   vancomycin 750 mg in D5W 250 mL IVPB (admixture device) (750 mg Intravenous New Bag 1/14/25 7643)   piperacillin-tazobactam (ZOSYN) 4.5 g in D5W 100 mL IVPB (MB+) (0 g Intravenous Stopped 1/14/25 0515)   lactated ringers bolus 500 mL (0 mLs Intravenous Stopped 1/14/25 0625)   iohexoL (OMNIPAQUE 350) injection 100 mL (100 mLs Intravenous Given 1/14/25 6357)     Medical Decision Making  Amount and/or Complexity of Data Reviewed  Radiology: ordered.    Risk  Prescription drug management.    Differential diagnosis (includes but is not limited to):   Driveline infection, abscess, sepsis, pneumonia, intra-abdominal infection, dehydration, kidney injury, electrolyte abnormalities    MDM Narrative  39-year-old male who is LVAD-dependent presents for abdominal pain, nausea, vomiting, shortness of breath.  Patient tachycardic and tachypneic on arrival.  Dressing in place to drive line appears clean dry and intact this time.  Mechanical home heard on auscultation.  Labs reviewed, significant leukocytosis of 27.21 with a left shift noted, blood cultures pending, lactic acid elevated at 3.0, broad-spectrum antibiotics initiated with vancomycin and Zosyn.  Creatinine mildly elevated to 1.61.  BNP elevated, troponin mildly elevated.  EKG reviewed.  Chest x-ray reviewed.  CT scan of the chest/abdomen/pelvis with IV contrast performed, shows a loculated pleural effusion as well as progression of the patient's anasarca edema.  Transplant team contacted by transfer center, case discussed, patient has been accepted for transfer by Dr. Pulido.  We will ensure all records including imaging discs and lab results are transferred with the patient to accepting facility.  Patient remains at  "his baseline mental status.    Dispo: Admit    My independent radiology interpretation: as above  Point of care US (independently performed and interpreted):   Decision rules/clinical scoring:     Sepsis Perfusion Assessment: "I attest a sepsis perfusion exam was performed within 6 hours of sepsis, severe sepsis, or septic shock presentation, following fluid resuscitation."     Amount and/or Complexity of Data Reviewed  Independent historian: parent   Summary of history:  History corroborated with the patient's mother who is present at bedside  External data reviewed: notes from previous ED visits, notes from clinic visits, and notes from outside facilities (through [facility])  Summary of data reviewed: Prior records reviewed  Risk and benefits of testing: discussed   Labs: ordered and reviewed  Radiology: ordered and independent interpretation performed (see above or ED course)  ECG/medicine tests: ordered and independent interpretation performed (see above or ED course)  Discussion of management or test interpretation with external provider(s): discussed with transferring physician   Summary of discussion: as above    Risk  Parenteral controlled substances   Drug therapy requiring intense monitoring for toxicity   Decision regarding hospitalization  Shared decision making     Critical Care  30-74 minutes     Data Reviewed/Counseling: I have personally reviewed the patient's vital signs, nursing notes, and other relevant tests, information, and imaging. I had a detailed discussion regarding the historical points, exam findings, and any diagnostic results supporting the discharge diagnosis. I personally performed the history, PE, MDM and procedures as documented above and agree with the scribe's documentation.    Portions of this note were dictated using voice recognition software. Although it was reviewed for accuracy, some inherent voice recognition errors may have occurred and may be present in this " document.         Scribe Attestation:   Scribe #1: I performed the above scribed service and the documentation accurately describes the services I performed. I attest to the accuracy of the note.    Attending Attestation:           Physician Attestation for Scribe:  Physician Attestation Statement for Scribe #1: I, Eron Lees MD, reviewed documentation, as scribed by Tara Roblero in my presence, and it is both accurate and complete.             ED Course as of 01/14/25 0725 Tue Jan 14, 2025   0430 X-Ray Chest AP  Independently visualized/reviewed by me during the ED visit.  - Cardiomegaly, LVAD in place, no lobar consolidation or PTX [MC]   0430 EKG independently interpreted by me.  EKG: ST @ 112, baseline LVAD artifact, Qtc 535 [MC]   0719 Patient accepted by Dr. Pulido for transfer. [MC]      ED Course User Index  [MC] Eron Lees MD                           Clinical Impression:  Final diagnoses:  [R06.02] SOB (shortness of breath)          ED Disposition Condition    Transfer to Another Facility Stable                Eron Lees MD  01/14/25 1120

## 2025-01-15 ENCOUNTER — HOSPITAL ENCOUNTER (INPATIENT)
Facility: HOSPITAL | Age: 40
LOS: 10 days | Discharge: HOME | DRG: 314 | End: 2025-01-25
Attending: INTERNAL MEDICINE | Admitting: INTERNAL MEDICINE
Payer: MEDICAID

## 2025-01-15 VITALS
HEIGHT: 75 IN | HEART RATE: 93 BPM | BODY MASS INDEX: 19.89 KG/M2 | TEMPERATURE: 98 F | RESPIRATION RATE: 14 BRPM | OXYGEN SATURATION: 95 % | WEIGHT: 160 LBS

## 2025-01-15 DIAGNOSIS — R18.8 OTHER ASCITES: ICD-10-CM

## 2025-01-15 DIAGNOSIS — I50.9 CHF (CONGESTIVE HEART FAILURE): ICD-10-CM

## 2025-01-15 DIAGNOSIS — I50.23 ACUTE ON CHRONIC SYSTOLIC CONGESTIVE HEART FAILURE: ICD-10-CM

## 2025-01-15 DIAGNOSIS — B95.61 MSSA BACTEREMIA: Primary | ICD-10-CM

## 2025-01-15 DIAGNOSIS — I50.814 RIGHT HEART FAILURE SECONDARY TO LEFT HEART FAILURE: ICD-10-CM

## 2025-01-15 DIAGNOSIS — T82.9XXA LEFT VENTRICULAR ASSIST DEVICE (LVAD) COMPLICATION: ICD-10-CM

## 2025-01-15 DIAGNOSIS — Z79.4 TYPE 2 DIABETES MELLITUS WITH HYPERGLYCEMIA, WITH LONG-TERM CURRENT USE OF INSULIN: ICD-10-CM

## 2025-01-15 DIAGNOSIS — Z95.811 LVAD (LEFT VENTRICULAR ASSIST DEVICE) PRESENT: ICD-10-CM

## 2025-01-15 DIAGNOSIS — A41.9 SEPSIS: ICD-10-CM

## 2025-01-15 DIAGNOSIS — E11.65 TYPE 2 DIABETES MELLITUS WITH HYPERGLYCEMIA, WITH LONG-TERM CURRENT USE OF INSULIN: ICD-10-CM

## 2025-01-15 DIAGNOSIS — R78.81 MSSA BACTEREMIA: Primary | ICD-10-CM

## 2025-01-15 DIAGNOSIS — I50.23 ACUTE ON CHRONIC SYSTOLIC CHF (CONGESTIVE HEART FAILURE): ICD-10-CM

## 2025-01-15 PROBLEM — K62.5 RECTAL BLEEDING: Status: RESOLVED | Noted: 2024-09-23 | Resolved: 2025-01-15

## 2025-01-15 PROBLEM — K92.1 HEMATOCHEZIA: Status: RESOLVED | Noted: 2024-09-24 | Resolved: 2025-01-15

## 2025-01-15 LAB
ACB COMPLEX DNA BLD POS QL NAA+NON-PROBE: NOT DETECTED
ALBUMIN SERPL BCP-MCNC: 2.2 G/DL (ref 3.5–5.2)
ALBUMIN SERPL BCP-MCNC: 2.4 G/DL (ref 3.5–5.2)
ALP SERPL-CCNC: 141 U/L (ref 40–150)
ALP SERPL-CCNC: 150 U/L (ref 40–150)
ALT SERPL W/O P-5'-P-CCNC: 14 U/L (ref 10–44)
ALT SERPL W/O P-5'-P-CCNC: 14 U/L (ref 10–44)
ANION GAP SERPL CALC-SCNC: 7 MMOL/L (ref 8–16)
AST SERPL-CCNC: 31 U/L (ref 10–40)
AST SERPL-CCNC: 35 U/L (ref 10–40)
B FRAGILIS DNA BLD POS QL NAA+PROBE: NOT DETECTED
BASOPHILS # BLD AUTO: 0.03 K/UL (ref 0–0.2)
BASOPHILS NFR BLD: 0.2 % (ref 0–1.9)
BILIRUB DIRECT SERPL-MCNC: 1.6 MG/DL (ref 0.1–0.3)
BILIRUB DIRECT SERPL-MCNC: 1.6 MG/DL (ref 0.1–0.3)
BILIRUB SERPL-MCNC: 2.3 MG/DL (ref 0.1–1)
BILIRUB SERPL-MCNC: 2.4 MG/DL (ref 0.1–1)
BNP SERPL-MCNC: 1157 PG/ML (ref 0–99)
BUN SERPL-MCNC: 19 MG/DL (ref 6–20)
C ALBICANS DNA BLD POS QL NAA+PROBE: NOT DETECTED
C AURIS DNA BLD POS QL NAA+NON-PROBE: NOT DETECTED
C GATTII+NEOFOR DNA CSF QL NAA+NON-PROBE: NOT DETECTED
C GLABRATA DNA BLD POS QL NAA+PROBE: NOT DETECTED
C KRUSEI DNA BLD POS QL NAA+PROBE: NOT DETECTED
C PARAP DNA BLD POS QL NAA+PROBE: NOT DETECTED
C TROPICLS DNA BLD POS QL NAA+PROBE: NOT DETECTED
CALCIUM SERPL-MCNC: 8.3 MG/DL (ref 8.7–10.5)
CHLORIDE SERPL-SCNC: 100 MMOL/L (ref 95–110)
CO2 SERPL-SCNC: 25 MMOL/L (ref 23–29)
COLISTIN RES MCR-1 ISLT/SPM QL: ABNORMAL
CREAT SERPL-MCNC: 1.3 MG/DL (ref 0.5–1.4)
CRP SERPL-MCNC: 125.3 MG/L (ref 0–8.2)
DIFFERENTIAL METHOD BLD: ABNORMAL
E CLOAC COMP DNA BLD POS QL NAA+PROBE: NOT DETECTED
E COLI DNA BLD POS QL NAA+PROBE: NOT DETECTED
E FAECALIS+OTHR E SP RRNA BLD POS FISH: NOT DETECTED
E FAECIUM HSP60 BLD POS QL PROBE: NOT DETECTED
ENTEROBACTERALES DNA BLD POS NAA+N-PRB: NOT DETECTED
EOSINOPHIL # BLD AUTO: 0 K/UL (ref 0–0.5)
EOSINOPHIL NFR BLD: 0.3 % (ref 0–8)
ERYTHROCYTE [DISTWIDTH] IN BLOOD BY AUTOMATED COUNT: 23.4 % (ref 11.5–14.5)
ESBL CFT TO CFT-CLAV IC RTO BD POS IMP: ABNORMAL
EST. GFR  (NO RACE VARIABLE): >60 ML/MIN/1.73 M^2
GLUCOSE SERPL-MCNC: 182 MG/DL (ref 70–110)
GP B STREP DNA CSF QL NAA+NON-PROBE: NOT DETECTED
HAEM INFLU DNA CSF QL NAA+NON-PROBE: NOT DETECTED
HCT VFR BLD AUTO: 26.1 % (ref 40–54)
HGB BLD-MCNC: 7.9 G/DL (ref 14–18)
IMM GRANULOCYTES # BLD AUTO: 0.1 K/UL (ref 0–0.04)
IMM GRANULOCYTES NFR BLD AUTO: 0.6 % (ref 0–0.5)
IMP CARBAPENEMASE ISLT QL IA.RAPID: ABNORMAL
INR PPP: 2.1 (ref 0.8–1.2)
INR PPP: 2.1 (ref 0.8–1.2)
K OXYTOCA OMPA BLD POS QL PROBE: NOT DETECTED
KLEBSIELLA SP DNA BLD POS QL NAA+NON-PRB: NOT DETECTED
KLEBSIELLA SP DNA BLD POS QL NAA+NON-PRB: NOT DETECTED
KPC CARBAPENEMASE ISLT QL IA.RAPID: ABNORMAL
L MONOCYTOG DNA CSF QL NAA+NON-PROBE: NOT DETECTED
LYMPHOCYTES # BLD AUTO: 0.8 K/UL (ref 1–4.8)
LYMPHOCYTES NFR BLD: 5.2 % (ref 18–48)
MAGNESIUM SERPL-MCNC: 1.7 MG/DL (ref 1.6–2.6)
MCH RBC QN AUTO: 18.4 PG (ref 27–31)
MCHC RBC AUTO-ENTMCNC: 30.3 G/DL (ref 32–36)
MCV RBC AUTO: 61 FL (ref 82–98)
MECA+MECC NOSE QL NAA+PROBE: ABNORMAL
MECA+MECC+MREJ ISLT/SPM QL: NOT DETECTED
MONOCYTES # BLD AUTO: 0.8 K/UL (ref 0.3–1)
MONOCYTES NFR BLD: 5.3 % (ref 4–15)
MRSA SCREEN BY PCR: NOT DETECTED
N MEN DNA CSF QL NAA+NON-PROBE: NOT DETECTED
NDM CARBAPENEMASE ISLT QL IA.RAPID: ABNORMAL
NEUTROPHILS # BLD AUTO: 13.7 K/UL (ref 1.8–7.7)
NEUTROPHILS NFR BLD: 88.4 % (ref 38–73)
NRBC BLD-RTO: 0 /100 WBC
OXA-48-LIKE CRBPNASE ISLT QL IA.RAPID: ABNORMAL
P AERUGINOSA DNA BLD POS QL NAA+PROBE: NOT DETECTED
PLATELET # BLD AUTO: 217 K/UL (ref 150–450)
PMV BLD AUTO: ABNORMAL FL (ref 9.2–12.9)
POCT GLUCOSE: 135 MG/DL (ref 70–110)
POCT GLUCOSE: 203 MG/DL (ref 70–110)
POCT GLUCOSE: 221 MG/DL (ref 70–110)
POCT GLUCOSE: 232 MG/DL (ref 70–110)
POTASSIUM SERPL-SCNC: 3.4 MMOL/L (ref 3.5–5.1)
PREALB SERPL-MCNC: 10 MG/DL (ref 20–43)
PROT SERPL-MCNC: 7.9 G/DL (ref 6–8.4)
PROT SERPL-MCNC: 8.6 G/DL (ref 6–8.4)
PROTEUS SP DNA BLD POS QL NAA+PROBE: NOT DETECTED
PROTHROMBIN TIME: 22 SEC (ref 9–12.5)
PROTHROMBIN TIME: 22 SEC (ref 9–12.5)
RBC # BLD AUTO: 4.29 M/UL (ref 4.6–6.2)
S AUREUS DNA BLD POS QL NAA+PROBE: DETECTED
S ENT+BONG DNA STL QL NAA+NON-PROBE: NOT DETECTED
S EPIDERMIDIS HSP60 BLD POS QL PROBE: NOT DETECTED
S LUGDUNENSIS SODA BLD POS QL PROBE: NOT DETECTED
S MALTOPH DNA BLD POS QL NAA+PROBE: NOT DETECTED
S MARCESCENS DNA BLD POS QL NAA+PROBE: NOT DETECTED
S PNEUM DNA CSF QL NAA+NON-PROBE: NOT DETECTED
S PYOGENES HSP60 BLD POS QL PROBE: NOT DETECTED
SODIUM SERPL-SCNC: 132 MMOL/L (ref 136–145)
STAPH SP TUF BLD POS QL PROBE: ABNORMAL
STREPTOCOCCUS SP TUF BLD POS QL PROBE: NOT DETECTED
VAN(A+B+C1+C2) GENES ISLT/SPM: ABNORMAL
VANCOMYCIN TROUGH SERPL-MCNC: 21.8 UG/ML (ref 10–22)
VIM CARBAPENEMASE ISLT QL IA.RAPID: ABNORMAL
WBC # BLD AUTO: 15.46 K/UL (ref 3.9–12.7)

## 2025-01-15 PROCEDURE — 80076 HEPATIC FUNCTION PANEL: CPT | Performed by: STUDENT IN AN ORGANIZED HEALTH CARE EDUCATION/TRAINING PROGRAM

## 2025-01-15 PROCEDURE — 80202 ASSAY OF VANCOMYCIN: CPT | Performed by: INTERNAL MEDICINE

## 2025-01-15 PROCEDURE — 25000003 PHARM REV CODE 250: Performed by: INTERNAL MEDICINE

## 2025-01-15 PROCEDURE — 85610 PROTHROMBIN TIME: CPT | Performed by: INTERNAL MEDICINE

## 2025-01-15 PROCEDURE — 80076 HEPATIC FUNCTION PANEL: CPT | Mod: 91 | Performed by: NURSE PRACTITIONER

## 2025-01-15 PROCEDURE — 93750 INTERROGATION VAD IN PERSON: CPT | Mod: ,,, | Performed by: INTERNAL MEDICINE

## 2025-01-15 PROCEDURE — 63600175 PHARM REV CODE 636 W HCPCS: Performed by: STUDENT IN AN ORGANIZED HEALTH CARE EDUCATION/TRAINING PROGRAM

## 2025-01-15 PROCEDURE — 63600175 PHARM REV CODE 636 W HCPCS: Performed by: INTERNAL MEDICINE

## 2025-01-15 PROCEDURE — 83735 ASSAY OF MAGNESIUM: CPT | Performed by: NURSE PRACTITIONER

## 2025-01-15 PROCEDURE — 4A02XFZ MEASUREMENT OF CARDIAC RHYTHM, EXTERNAL APPROACH: ICD-10-PCS | Performed by: INTERNAL MEDICINE

## 2025-01-15 PROCEDURE — 86140 C-REACTIVE PROTEIN: CPT | Performed by: STUDENT IN AN ORGANIZED HEALTH CARE EDUCATION/TRAINING PROGRAM

## 2025-01-15 PROCEDURE — 20600001 HC STEP DOWN PRIVATE ROOM

## 2025-01-15 PROCEDURE — 94799 UNLISTED PULMONARY SVC/PX: CPT

## 2025-01-15 PROCEDURE — 85025 COMPLETE CBC W/AUTO DIFF WBC: CPT | Performed by: NURSE PRACTITIONER

## 2025-01-15 PROCEDURE — 36415 COLL VENOUS BLD VENIPUNCTURE: CPT | Performed by: STUDENT IN AN ORGANIZED HEALTH CARE EDUCATION/TRAINING PROGRAM

## 2025-01-15 PROCEDURE — 80048 BASIC METABOLIC PNL TOTAL CA: CPT | Performed by: NURSE PRACTITIONER

## 2025-01-15 PROCEDURE — 36415 COLL VENOUS BLD VENIPUNCTURE: CPT | Performed by: INTERNAL MEDICINE

## 2025-01-15 PROCEDURE — 63600175 PHARM REV CODE 636 W HCPCS: Performed by: NURSE PRACTITIONER

## 2025-01-15 PROCEDURE — 27000248 HC VAD-ADDITIONAL DAY

## 2025-01-15 PROCEDURE — 25000003 PHARM REV CODE 250: Performed by: STUDENT IN AN ORGANIZED HEALTH CARE EDUCATION/TRAINING PROGRAM

## 2025-01-15 PROCEDURE — 83880 ASSAY OF NATRIURETIC PEPTIDE: CPT | Performed by: STUDENT IN AN ORGANIZED HEALTH CARE EDUCATION/TRAINING PROGRAM

## 2025-01-15 PROCEDURE — 99223 1ST HOSP IP/OBS HIGH 75: CPT | Mod: 25,,, | Performed by: INTERNAL MEDICINE

## 2025-01-15 PROCEDURE — 84134 ASSAY OF PREALBUMIN: CPT | Performed by: STUDENT IN AN ORGANIZED HEALTH CARE EDUCATION/TRAINING PROGRAM

## 2025-01-15 PROCEDURE — 87641 MR-STAPH DNA AMP PROBE: CPT

## 2025-01-15 PROCEDURE — 25000003 PHARM REV CODE 250

## 2025-01-15 PROCEDURE — 99223 1ST HOSP IP/OBS HIGH 75: CPT | Mod: ,,, | Performed by: INTERNAL MEDICINE

## 2025-01-15 PROCEDURE — 63600175 PHARM REV CODE 636 W HCPCS

## 2025-01-15 PROCEDURE — 25000003 PHARM REV CODE 250: Performed by: NURSE PRACTITIONER

## 2025-01-15 RX ORDER — IBUPROFEN 200 MG
16 TABLET ORAL
Status: DISCONTINUED | OUTPATIENT
Start: 2025-01-15 | End: 2025-01-15

## 2025-01-15 RX ORDER — POTASSIUM CHLORIDE 20 MEQ/1
20 TABLET, EXTENDED RELEASE ORAL 2 TIMES DAILY
Status: DISCONTINUED | OUTPATIENT
Start: 2025-01-15 | End: 2025-01-16

## 2025-01-15 RX ORDER — DULOXETIN HYDROCHLORIDE 30 MG/1
30 CAPSULE, DELAYED RELEASE ORAL DAILY
Status: DISCONTINUED | OUTPATIENT
Start: 2025-01-15 | End: 2025-01-25 | Stop reason: HOSPADM

## 2025-01-15 RX ORDER — LEVETIRACETAM 500 MG/1
1500 TABLET ORAL 2 TIMES DAILY
Status: DISCONTINUED | OUTPATIENT
Start: 2025-01-15 | End: 2025-01-25 | Stop reason: HOSPADM

## 2025-01-15 RX ORDER — ASPIRIN 81 MG/1
81 TABLET ORAL DAILY
Status: DISCONTINUED | OUTPATIENT
Start: 2025-01-15 | End: 2025-01-25 | Stop reason: HOSPADM

## 2025-01-15 RX ORDER — AMIODARONE HYDROCHLORIDE 200 MG/1
200 TABLET ORAL DAILY
Status: DISCONTINUED | OUTPATIENT
Start: 2025-01-15 | End: 2025-01-25 | Stop reason: HOSPADM

## 2025-01-15 RX ORDER — GLUCAGON 1 MG
1 KIT INJECTION
Status: DISCONTINUED | OUTPATIENT
Start: 2025-01-15 | End: 2025-01-15

## 2025-01-15 RX ORDER — FUROSEMIDE 10 MG/ML
80 INJECTION INTRAMUSCULAR; INTRAVENOUS ONCE
Status: COMPLETED | OUTPATIENT
Start: 2025-01-15 | End: 2025-01-15

## 2025-01-15 RX ORDER — MUPIROCIN 20 MG/G
OINTMENT TOPICAL 2 TIMES DAILY
Status: DISPENSED | OUTPATIENT
Start: 2025-01-15 | End: 2025-01-20

## 2025-01-15 RX ORDER — IBUPROFEN 200 MG
24 TABLET ORAL
Status: DISCONTINUED | OUTPATIENT
Start: 2025-01-15 | End: 2025-01-15

## 2025-01-15 RX ORDER — GLUCAGON 1 MG
1 KIT INJECTION
Status: DISCONTINUED | OUTPATIENT
Start: 2025-01-15 | End: 2025-01-25 | Stop reason: HOSPADM

## 2025-01-15 RX ORDER — PANTOPRAZOLE SODIUM 40 MG/1
40 TABLET, DELAYED RELEASE ORAL DAILY
Status: DISCONTINUED | OUTPATIENT
Start: 2025-01-15 | End: 2025-01-25 | Stop reason: HOSPADM

## 2025-01-15 RX ORDER — LANOLIN ALCOHOL/MO/W.PET/CERES
400 CREAM (GRAM) TOPICAL 2 TIMES DAILY
Status: DISCONTINUED | OUTPATIENT
Start: 2025-01-15 | End: 2025-01-20

## 2025-01-15 RX ORDER — INSULIN ASPART 100 [IU]/ML
0-5 INJECTION, SOLUTION INTRAVENOUS; SUBCUTANEOUS
Status: DISCONTINUED | OUTPATIENT
Start: 2025-01-15 | End: 2025-01-15

## 2025-01-15 RX ORDER — ACETAMINOPHEN 325 MG/1
650 TABLET ORAL EVERY 6 HOURS PRN
Status: DISCONTINUED | OUTPATIENT
Start: 2025-01-15 | End: 2025-01-25 | Stop reason: HOSPADM

## 2025-01-15 RX ORDER — MAGNESIUM SULFATE HEPTAHYDRATE 40 MG/ML
2 INJECTION, SOLUTION INTRAVENOUS ONCE
Status: COMPLETED | OUTPATIENT
Start: 2025-01-15 | End: 2025-01-15

## 2025-01-15 RX ORDER — GABAPENTIN 400 MG/1
400 CAPSULE ORAL NIGHTLY
Status: DISCONTINUED | OUTPATIENT
Start: 2025-01-15 | End: 2025-01-25 | Stop reason: HOSPADM

## 2025-01-15 RX ORDER — EPLERENONE 25 MG/1
50 TABLET, FILM COATED ORAL DAILY
Status: DISCONTINUED | OUTPATIENT
Start: 2025-01-15 | End: 2025-01-25 | Stop reason: HOSPADM

## 2025-01-15 RX ORDER — IBUPROFEN 200 MG
24 TABLET ORAL
Status: DISCONTINUED | OUTPATIENT
Start: 2025-01-15 | End: 2025-01-25 | Stop reason: HOSPADM

## 2025-01-15 RX ORDER — INSULIN ASPART 100 [IU]/ML
10 INJECTION, SOLUTION INTRAVENOUS; SUBCUTANEOUS
Status: DISCONTINUED | OUTPATIENT
Start: 2025-01-15 | End: 2025-01-19

## 2025-01-15 RX ORDER — ONDANSETRON 4 MG/1
4 TABLET, ORALLY DISINTEGRATING ORAL EVERY 8 HOURS PRN
Status: DISCONTINUED | OUTPATIENT
Start: 2025-01-15 | End: 2025-01-25 | Stop reason: HOSPADM

## 2025-01-15 RX ORDER — INSULIN ASPART 100 [IU]/ML
0-10 INJECTION, SOLUTION INTRAVENOUS; SUBCUTANEOUS
Status: DISCONTINUED | OUTPATIENT
Start: 2025-01-15 | End: 2025-01-19

## 2025-01-15 RX ORDER — AMOXICILLIN 250 MG
1 CAPSULE ORAL DAILY PRN
Status: DISCONTINUED | OUTPATIENT
Start: 2025-01-15 | End: 2025-01-25 | Stop reason: HOSPADM

## 2025-01-15 RX ORDER — AMLODIPINE BESYLATE 10 MG/1
10 TABLET ORAL DAILY
Status: DISCONTINUED | OUTPATIENT
Start: 2025-01-15 | End: 2025-01-25 | Stop reason: HOSPADM

## 2025-01-15 RX ORDER — ATORVASTATIN CALCIUM 40 MG/1
40 TABLET, FILM COATED ORAL DAILY
Status: DISCONTINUED | OUTPATIENT
Start: 2025-01-15 | End: 2025-01-25 | Stop reason: HOSPADM

## 2025-01-15 RX ORDER — TORSEMIDE 20 MG/1
60 TABLET ORAL 2 TIMES DAILY
Status: DISCONTINUED | OUTPATIENT
Start: 2025-01-15 | End: 2025-01-15

## 2025-01-15 RX ORDER — GABAPENTIN 100 MG/1
100 CAPSULE ORAL 2 TIMES DAILY
Status: DISCONTINUED | OUTPATIENT
Start: 2025-01-15 | End: 2025-01-25 | Stop reason: HOSPADM

## 2025-01-15 RX ORDER — POTASSIUM CHLORIDE 20 MEQ/1
60 TABLET, EXTENDED RELEASE ORAL ONCE
Status: COMPLETED | OUTPATIENT
Start: 2025-01-15 | End: 2025-01-15

## 2025-01-15 RX ORDER — IBUPROFEN 200 MG
16 TABLET ORAL
Status: DISCONTINUED | OUTPATIENT
Start: 2025-01-15 | End: 2025-01-25 | Stop reason: HOSPADM

## 2025-01-15 RX ORDER — INSULIN GLARGINE 100 [IU]/ML
10 INJECTION, SOLUTION SUBCUTANEOUS DAILY
Status: DISCONTINUED | OUTPATIENT
Start: 2025-01-15 | End: 2025-01-25 | Stop reason: HOSPADM

## 2025-01-15 RX ADMIN — AMIODARONE HYDROCHLORIDE 200 MG: 200 TABLET ORAL at 09:01

## 2025-01-15 RX ADMIN — FUROSEMIDE 40 MG/HR: 10 INJECTION, SOLUTION INTRAMUSCULAR; INTRAVENOUS at 10:01

## 2025-01-15 RX ADMIN — PIPERACILLIN SODIUM AND TAZOBACTAM SODIUM 4.5 G: 4; .5 INJECTION, POWDER, FOR SOLUTION INTRAVENOUS at 04:01

## 2025-01-15 RX ADMIN — AMLODIPINE BESYLATE 10 MG: 10 TABLET ORAL at 09:01

## 2025-01-15 RX ADMIN — DULOXETINE HYDROCHLORIDE 30 MG: 30 CAPSULE, DELAYED RELEASE ORAL at 09:01

## 2025-01-15 RX ADMIN — PIPERACILLIN SODIUM AND TAZOBACTAM SODIUM 4.5 G: 4; .5 INJECTION, POWDER, FOR SOLUTION INTRAVENOUS at 11:01

## 2025-01-15 RX ADMIN — ACETAMINOPHEN 650 MG: 325 TABLET ORAL at 05:01

## 2025-01-15 RX ADMIN — WARFARIN SODIUM 1.5 MG: 1 TABLET ORAL at 05:01

## 2025-01-15 RX ADMIN — PANTOPRAZOLE SODIUM 40 MG: 40 TABLET, DELAYED RELEASE ORAL at 09:01

## 2025-01-15 RX ADMIN — Medication 400 MG: at 08:01

## 2025-01-15 RX ADMIN — MAGNESIUM SULFATE HEPTAHYDRATE 2 G: 40 INJECTION, SOLUTION INTRAVENOUS at 11:01

## 2025-01-15 RX ADMIN — POTASSIUM CHLORIDE 20 MEQ: 1500 TABLET, EXTENDED RELEASE ORAL at 08:01

## 2025-01-15 RX ADMIN — EPLERENONE 50 MG: 25 TABLET, FILM COATED ORAL at 09:01

## 2025-01-15 RX ADMIN — INSULIN ASPART 4 UNITS: 100 INJECTION, SOLUTION INTRAVENOUS; SUBCUTANEOUS at 01:01

## 2025-01-15 RX ADMIN — POTASSIUM CHLORIDE 60 MEQ: 1500 TABLET, EXTENDED RELEASE ORAL at 11:01

## 2025-01-15 RX ADMIN — EMPAGLIFLOZIN 10 MG: 10 TABLET, FILM COATED ORAL at 09:01

## 2025-01-15 RX ADMIN — ACETAMINOPHEN 650 MG: 325 TABLET ORAL at 08:01

## 2025-01-15 RX ADMIN — VANCOMYCIN HYDROCHLORIDE 1000 MG: 1 INJECTION, POWDER, LYOPHILIZED, FOR SOLUTION INTRAVENOUS at 09:01

## 2025-01-15 RX ADMIN — INSULIN ASPART 10 UNITS: 100 INJECTION, SOLUTION INTRAVENOUS; SUBCUTANEOUS at 05:01

## 2025-01-15 RX ADMIN — FUROSEMIDE 80 MG: 10 INJECTION, SOLUTION INTRAVENOUS at 01:01

## 2025-01-15 RX ADMIN — INSULIN ASPART 10 UNITS: 100 INJECTION, SOLUTION INTRAVENOUS; SUBCUTANEOUS at 01:01

## 2025-01-15 RX ADMIN — INSULIN ASPART 4 UNITS: 100 INJECTION, SOLUTION INTRAVENOUS; SUBCUTANEOUS at 05:01

## 2025-01-15 RX ADMIN — Medication 400 MG: at 11:01

## 2025-01-15 RX ADMIN — GABAPENTIN 100 MG: 100 CAPSULE ORAL at 08:01

## 2025-01-15 RX ADMIN — ASPIRIN 81 MG: 81 TABLET, COATED ORAL at 09:01

## 2025-01-15 RX ADMIN — PIPERACILLIN SODIUM AND TAZOBACTAM SODIUM 4.5 G: 4; .5 INJECTION, POWDER, FOR SOLUTION INTRAVENOUS at 09:01

## 2025-01-15 RX ADMIN — GABAPENTIN 100 MG: 100 CAPSULE ORAL at 09:01

## 2025-01-15 RX ADMIN — INSULIN ASPART 10 UNITS: 100 INJECTION, SOLUTION INTRAVENOUS; SUBCUTANEOUS at 09:01

## 2025-01-15 RX ADMIN — GABAPENTIN 400 MG: 400 CAPSULE ORAL at 08:01

## 2025-01-15 RX ADMIN — MUPIROCIN: 20 OINTMENT TOPICAL at 08:01

## 2025-01-15 RX ADMIN — ATORVASTATIN CALCIUM 40 MG: 40 TABLET, FILM COATED ORAL at 09:01

## 2025-01-15 RX ADMIN — FUROSEMIDE 40 MG/HR: 10 INJECTION, SOLUTION INTRAMUSCULAR; INTRAVENOUS at 01:01

## 2025-01-15 RX ADMIN — LEVETIRACETAM 1500 MG: 500 TABLET, FILM COATED ORAL at 09:01

## 2025-01-15 RX ADMIN — GABAPENTIN 400 MG: 400 CAPSULE ORAL at 04:01

## 2025-01-15 RX ADMIN — LEVETIRACETAM 1500 MG: 500 TABLET, FILM COATED ORAL at 08:01

## 2025-01-15 RX ADMIN — INSULIN GLARGINE 10 UNITS: 100 INJECTION, SOLUTION SUBCUTANEOUS at 09:01

## 2025-01-15 NOTE — ED NOTES
Battery changed on device ,  family member has 2 batteries, pt does not have  here at hospital, informed family member that hospital here does not have batteries for device or a charged and needs to bring extra batteries and  to hospital

## 2025-01-15 NOTE — ED NOTES
Informed mother and pt that waiting on transfer still no eta, mother brought pt device battery charger and extra batteries, mother also has specialty dressing for device site.  Mother to change dressing states she was trained on specialty dressing and does them at home.

## 2025-01-15 NOTE — PROGRESS NOTES
Patient arrived to the unit. Telemetry box applied and vital signs documented in flow sheet. IV 20 rc maintained. Identification band on patient. Pt oriented to room, plan of care reviewed, and admission completed. Call light and belongings within reach. Pt with no concerns at this time. Bed in low and locked position.     LVAD dressing CDI pt refused dressing change this AM, states all he wanted was to sleep and eat.

## 2025-01-15 NOTE — PROGRESS NOTES
"LVAD dressing change completed using sterile technique with daily kit. DLES is a "2" with dried scant sanguineous drainage noted on the drain sponge from a small skin irritation below DLES, unable to obtain culture sample, LUCÍA Lambert notified. Tolerated without any complication. no redness, or tenderness noted. Pics added below.      01/15/25 1600        VAD 06/29/22 1115 Left ventricular assist device HeartMate 3   Placement Date/Time: 06/29/22 1115   Present Prior to Hospital Arrival?: No  Inserted by: MD  VAD Type: Left ventricular assist device  VAD Brand: HeartMate 3   Site Location Abdomen right   Site Assessment Clean;Intact   Driveline Exit Site 2   Driveline Assessment Free of Kinks;Intact;Modular cable Clean and Locked   Dressing Status Clean;Dry;Intact   Dressing Intervention Sterile dressing change   Performed By RN   Dressing Change Schedule Daily   Dressing Change Due 01/16/25   Integrity dry;intact   Driveline Start in use Tape   Condition CDI   Date changed 01/15/25   Post Removal Complications None             "

## 2025-01-15 NOTE — PROGRESS NOTES
"Pharmacokinetic Initial Assessment: IV Vancomycin    Assessment/Plan:    Initiate intravenous vancomycin with loading dose of 1750 mg once followed by a maintenance dose of vancomycin 1000 mg IV every 12 hours  Desired empiric serum trough concentration is 15 to 20 mcg/mL  Draw vancomycin trough level 60 min prior to fourth dose on 01/15/25 at approximately 2030  Pharmacy will continue to follow and monitor vancomycin.      Please contact pharmacy at extension [phone] with any questions regarding this assessment.     Thank you for the consult,   Bev Gray       Patient brief summary:  Kevan Queen is a 39 y.o. male initiated on antimicrobial therapy with IV Vancomycin for treatment of suspected sepsis    Drug Allergies:   Review of patient's allergies indicates:   Allergen Reactions    Bumex [bumetanide] Hives       Actual Body Weight:   72.6 kg    Renal Function:   Estimated Creatinine Clearance: 63.3 mL/min (A) (based on SCr of 1.61 mg/dL (H)).,     Dialysis Method (if applicable):  N/A    CBC (last 72 hours):  Recent Labs   Lab Result Units 01/14/25  0250   WBC x10(3)/mcL 27.21  27.21*   Hgb g/dL 8.8*   Hct % 31.0*   Platelet x10(3)/mcL 262   Monocytes % % 3       Metabolic Panel (last 72 hours):  Recent Labs   Lab Result Units 01/14/25  0250 01/14/25  0717 01/14/25  1003   Sodium mmol/L 137  --   --    Sodium, Blood Gas mmol/L  --   --  129*   Potassium mmol/L 3.5  --   --    Potassium, Blood Gas mmol/L  --   --  3.4*   Chloride mmol/L 102  --   --    CO2 mmol/L 22  --   --    Glucose mg/dL 167*  --   --    Glucose, UA   --  4+*  --    Blood Urea Nitrogen mg/dL 17.7  --   --    Creatinine mg/dL 1.61*  --   --    Albumin g/dL 2.6*  --   --    Bilirubin Total mg/dL 2.2*  --   --    ALP unit/L 191*  --   --    AST unit/L 32  --   --    ALT unit/L 12  --   --        Drug levels (last 3 results):  No results for input(s): "VANCOMYCINRA", "VANCORANDOM", "VANCOMYCINPE", "VANCOPEAK", "VANCOMYCINTR", " ""[facility]" in the last 72 hours.    Microbiologic Results:  Microbiology Results (last 7 days)       Procedure Component Value Units Date/Time    Blood culture #1 **CANNOT BE ORDERED STAT** [[id number]] Collected: 01/14/25 0451    Order Status: Resulted Specimen: Blood Updated: 01/14/25 0451    Blood culture #2 **CANNOT BE ORDERED STAT** [[id number]] Collected: 01/14/25 0451    Order Status: Resulted Specimen: Blood Updated: 01/14/25 0451            "

## 2025-01-15 NOTE — PROCEDURES
"Kevan Queen is a 39 y.o. male patient.    Temp: 98 °F (36.7 °C) (01/15/25 1147)  Pulse: 92 (01/15/25 1200)  Resp: 18 (01/15/25 1147)  BP: (!) 82/0 (01/15/25 1147)  SpO2: 97 % (01/15/25 1147)  Weight: 74 kg (163 lb 2.3 oz) (01/15/25 0300)  Height: 6' 3" (190.5 cm) (01/15/25 0300)    Procedures        1/15/2025     3:26 PM 1/15/2025    12:22 PM 1/15/2025     8:45 AM 1/15/2025     2:26 AM 1/8/2025     7:22 PM 1/8/2025     4:40 PM 1/8/2025    11:55 AM   TXP LVAD INTERROGATIONS   Type HeartMate3 HeartMate3 HeartMate3 HeartMate3 HeartMate3 HeartMate3    Flow 4.5 4.5 4.3 4.1 4.2 4.1 4.3   Speed 5100 5100 5150 5100 5100 5100 5100   PI 3.9 3.7 3.8 4.8 4.6 4.8 4.2   Power (Serna) 3.6 3.5 3.5 3.6 3.7 3.7 3.7   LSL 4700 4700 4700 4700 4700 4700 4700   Pulsatility No Pulse No Pulse No Pulse No Pulse No Pulse Intermittent pulse Intermittent pulse   Interrogation of Ventricular assist device was performed with physician analysis of device parameters and review of device function. I have personally reviewed the interrogation findings and agree with findings as stated.       1/15/2025    "

## 2025-01-15 NOTE — PLAN OF CARE
Plan of care discussed with patient/family.     Patient is free of fall or injury. Denies CP, SOB, or pain. K/Mg replaced. .  All questions addressed. Drivline daily kit dressing done today.

## 2025-01-15 NOTE — SUBJECTIVE & OBJECTIVE
Past Medical History:   Diagnosis Date    Acute respiratory failure with hypoxia 07/22/2023    Arthritis     Awareness alteration, transient 09/01/2022    Cardiomyopathy     CHF (congestive heart failure) 10/01/2020    COVID-19 06/03/2023    Diabetes mellitus     Dilated cardiomyopathy 10/26/2020    Drug abuse 10/2020    Headache 04/19/2023    Hyperglycemia 12/16/2022    Hyperosmolar hyperglycemic state (HHS) 05/25/2022    ICD (implantable cardioverter-defibrillator) in place 10/26/2020    Infected defibrillator now s/p removal Nov 2023 07/23/2023    Left ventricular assist device (LVAD) complication, initial encounter 06/05/2023    Muscle cramping 06/15/2022    Renal disorder     SOB (shortness of breath) 06/13/2022    Tingling in extremities 07/13/2022       Past Surgical History:   Procedure Laterality Date    APPLICATION OF WOUND VACUUM-ASSISTED CLOSURE DEVICE N/A 6/30/2022    Procedure: APPLICATION, WOUND VAC;  Surgeon: Luis F Paige MD;  Location: [facility] OR [facility] FLR;  Service: Cardiovascular;  Laterality: N/A;  50 x 5 cm    CARDIAC DEFIBRILLATOR PLACEMENT      COLONOSCOPY N/A 9/25/2024    Procedure: COLONOSCOPY;  Surgeon: Drake Barton MD;  Location: [facility] ENDO (2ND FLR);  Service: Endoscopy;  Laterality: N/A;    ECHOCARDIOGRAM,TRANSESOPHAGEAL  11/9/2023    Procedure: Transesophageal echo (GUILLERMO) intra-procedure log documentation;  Surgeon: Eron Ivy MD;  Location: [facility] EP LAB;  Service: Cardiology;;    ESOPHAGOGASTRODUODENOSCOPY N/A 9/25/2024    Procedure: EGD (ESOPHAGOGASTRODUODENOSCOPY);  Surgeon: Drake Barton MD;  Location: [facility] ENDO (2ND FLR);  Service: Endoscopy;  Laterality: N/A;    EXTRACTION, ELECTRODE LEAD Left 11/9/2023    Procedure: EXTRACTION, ELECTRODE LEAD;  Surgeon: ADALID Leon MD;  Location: [facility] EP LAB;  Service: Cardiology;  Laterality: Left;  INFECTION, LEAD EXTRACTION, GUILLERMO, BSCI, ANES, EH,   *NO CTS BACKUP*    IMPLANTATION OF RIGHT VENTRICULAR ASSIST DEVICE (RVAD)  N/A 6/29/2022    Procedure: INSERTION, RVAD;  Surgeon: Luis F Paige MD;  Location: [facility] OR 2ND FLR;  Service: Cardiovascular;  Laterality: N/A;    INSERTION OF GRAFT TO PERICARDIUM Right 6/30/2022    Procedure: INSERTION-RIGHT VENTRICULAR ASSIST DEVICE;  Surgeon: Luis F Paige MD;  Location: [facility] OR 2ND FLR;  Service: Cardiovascular;  Laterality: Right;    IRRIGATION OF MEDIASTINUM  6/30/2022    Procedure: IRRIGATION, MEDIASTINUM;  Surgeon: Luis F Paige MD;  Location: [facility] OR 2ND FLR;  Service: Cardiovascular;;    LEFT VENTRICULAR ASSIST DEVICE Left 6/23/2022    Procedure: INSERTION-LEFT VENTRICULAR ASSIST DEVICE;  Surgeon: Luis F Paige MD;  Location: [facility] OR 2ND FLR;  Service: Cardiovascular;  Laterality: Left;    LEFT VENTRICULAR ASSIST DEVICE N/A 6/29/2022    Procedure: INSERTION-LEFT VENTRICULAR ASSIST DEVICE;  Surgeon: Lius F Paige MD;  Location: [facility] OR 2ND FLR;  Service: Cardiovascular;  Laterality: N/A;    REVISION OF SKIN POCKET FOR CARDIOVERTER-DEFIBRILLATOR  11/9/2023    Procedure: REVISION, SKIN POCKET, FOR CARDIOVERTER-DEFIBRILLATOR;  Surgeon: ADALID Leon MD;  Location: [facility] EP LAB;  Service: Cardiology;;    RIGHT HEART CATHETERIZATION Right 4/8/2022    Procedure: INSERTION, CATHETER, RIGHT HEART;  Surgeon: Luca Lopez Jr., MD;  Location: [facility] CATH LAB;  Service: Cardiology;  Laterality: Right;    RIGHT HEART CATHETERIZATION Right 4/19/2022    Procedure: INSERTION, CATHETER, RIGHT HEART;  Surgeon: Josh Pulido MD;  Location: [facility] CATH LAB;  Service: Cardiology;  Laterality: Right;    RIGHT HEART CATHETERIZATION Right 7/21/2022    Procedure: INSERTION, CATHETER, RIGHT HEART;  Surgeon: Dalia Crum MD;  Location: [facility] CATH LAB;  Service: Cardiology;  Laterality: Right;    RIGHT HEART CATHETERIZATION Right 10/31/2022    Procedure: INSERTION, CATHETER, RIGHT HEART;  Surgeon: Dalia Crum MD;  Location: [facility] CATH LAB;  Service: Cardiology;  Laterality: Right;     STERNAL WOUND CLOSURE N/A 6/30/2022    Procedure: CLOSURE, WOUND, STERNUM;  Surgeon: Luis F Paige MD;  Location: [facility] OR [address] [place] [address];  Service: Cardiovascular;  Laterality: N/A;       Review of patient's allergies indicates:   Allergen Reactions    Bumex [bumetanide] Hives       PTA Medications   Medication Sig    acetaminophen (TYLENOL) 325 MG tablet Take 2 tablets (650 mg total) by mouth every 6 (six) hours as needed for Pain.    amiodarone (PACERONE) 200 MG Tab Take 1 tablet (200 mg total) by mouth once daily.    amLODIPine (NORVASC) 10 MG tablet Take 1 tablet (10 mg total) by mouth once daily.    aspirin (ECOTRIN) 81 MG EC tablet Take 1 tablet (81 mg total) by mouth once daily.    atorvastatin (LIPITOR) 40 MG tablet Take 1 tablet (40 mg total) by mouth once daily.    blood sugar diagnostic Strp Use to test blood glucose 4 (four) times daily.    blood-glucose meter (TRUE METRIX GLUCOSE METER) Misc Use to test blood glucose 4 (four) times daily.    blood-glucose sensor (FREESTYLE LUCIUS 3 SENSOR) Dee 1 Device by Misc.(Non-Drug; Combo Route) route every 14 (fourteen) days.    cefadroxil (DURICEF) 500 MG Cap Take 1 capsule (500 mg total) by mouth every 12 (twelve) hours.    DULoxetine (CYMBALTA) 30 MG capsule Take 1 capsule (30 mg total) by mouth once daily.    empagliflozin (JARDIANCE) 10 mg tablet Take 1 tablet (10 mg total) by mouth once daily.    eplerenone (INSPRA) 25 MG Tab Take 2 tablets (50 mg total) by mouth once daily.    gabapentin (NEURONTIN) 100 MG capsule Take 1 capsule (100 mg total) by mouth 2 (two) times daily AND 4 capsules (400 mg total) every evening.    glucagon (BAQSIMI) 3 mg/actuation Spry 1 each by Nasal route as needed (hypoglycemia).    insulin aspart U-100 (NOVOLOG) 100 unit/mL (3 mL) InPn pen Inject 18 Units into the skin 3 (three) times daily with meals: MAX 94 units/daily  150-200    201-250    251-300    301-350    >350  +2 units   +4 units    +6 units    +8 units    +10 units    insulin  "detemir U-100, Levemir, 100 unit/mL (3 mL) SubQ InPn pen Inject 10 Units into the skin 2 (two) times daily. In the morning and before bed.    lancets 33 gauge Misc Use to test blood glucose 4 (four) times daily.    levETIRAcetam (KEPPRA) 1000 MG tablet Take 1.5 tablets (1,500 mg total) by mouth 2 (two) times daily.    magnesium oxide (MAG-OX) 400 mg (241.3 mg magnesium) tablet Take 1 tablet (400 mg total) by mouth once daily.    ondansetron (ZOFRAN-ODT) 4 MG TbDL Take 1 tablet (4 mg total) by mouth every 8 (eight) hours as needed (nausea).    pantoprazole (PROTONIX) 40 MG tablet Take 1 tablet (40 mg total) by mouth once daily.    pen needle, diabetic 32 gauge x 5/32" Ndle 1 each by Misc.(Non-Drug; Combo Route) route 4 (four) times daily.    polyethylene glycol (GLYCOLAX) 17 gram PwPk Take 17 g by mouth once daily.    potassium chloride SA (K-DUR,KLOR-CON M) 10 MEQ tablet Take 3 tablets (30 mEq total) by mouth once daily.    senna-docusate 8.6-50 mg (PERICOLACE) 8.6-50 mg per tablet Take 1 tablet by mouth daily as needed for Constipation.    torsemide (DEMADEX) 20 MG Tab Take 3 tablets (60 mg total) by mouth 2 (two) times a day.    warfarin (COUMADIN) 3 MG tablet Take 0.5 tablets (1.5 mg total) by mouth Daily.     Family History       Problem Relation (Age of Onset)    Diverticulosis Brother    Heart attack Maternal Grandmother, Maternal Grandfather    Heart failure Father          Tobacco Use    Smoking status: Former     Current packs/day: 0.00     Average packs/day: 0.5 packs/day for 16.6 years (8.3 ttl pk-yrs)     Types: Cigarettes     Start date: 10/1/2004     Quit date: 4/23/2021     Years since quitting: 3.7    Smokeless tobacco: Never   Substance and Sexual Activity    Alcohol use: Not Currently    Drug use: Not Currently     Types: Marijuana, MDMA (Ecstacy)    Sexual activity: Yes     Partners: Female     Birth control/protection: None     Review of Systems   Gastrointestinal:  Positive for abdominal pain " and nausea.   All other systems reviewed and are negative.    Objective:     Vital Signs (Most Recent):  Temp: 98.9 °F (37.2 °C) (01/15/25 0330)  Resp: 18 (01/15/25 0300)  BP: (!) 86/0 (01/15/25 0230) Vital Signs (24h Range):  Temp:  [97.9 °F (36.6 °C)-98.9 °F (37.2 °C)] 98.9 °F (37.2 °C)  Pulse:  [] 93  Resp:  [13-23] 18  SpO2:  [79 %-100 %] 95 %  BP: (86)/(0) 86/0     Weight: 74 kg (163 lb 2.3 oz)  Body mass index is 20.39 kg/m².            No intake or output data in the 24 hours ending 01/15/25 0336    Lines/Drains/Airways       Line  Duration                  VAD 06/29/22 1115 Left ventricular assist device HeartMate 3 930 days                     Physical Exam  Vitals and nursing note reviewed.   Constitutional:       Appearance: Normal appearance.   HENT:      Head: Normocephalic and atraumatic.      Right Ear: External ear normal.      Left Ear: External ear normal.      Nose: Nose normal.      Mouth/Throat:      Mouth: Mucous membranes are moist.   Eyes:      Pupils: Pupils are equal, round, and reactive to light.   Cardiovascular:      Rate and Rhythm: Normal rate and regular rhythm.      Pulses: Normal pulses.      Comments: VAD hum  Pulmonary:      Effort: Pulmonary effort is normal.   Abdominal:      General: Abdomen is flat.   Musculoskeletal:         General: Normal range of motion.      Cervical back: Normal range of motion.      Right lower leg: No edema.      Left lower leg: No edema.   Skin:     General: Skin is warm and dry.      Capillary Refill: Capillary refill takes less than 2 seconds.   Neurological:      General: No focal deficit present.      Mental Status: He is alert and oriented to person, place, and time.            Significant Labs: All pertinent lab results from the last 24 hours have been reviewed.    Significant Imaging:  reviewed

## 2025-01-15 NOTE — PLAN OF CARE
Pt free of falls and injury. Pt AAOx4. Fall precautions remain in place. LVAD hum present and smooth. VAD numbers and dopplers WDL. DLES dressing CDI. Plan of care reviewed with pt. Call light and belongings within reach.    Problem: Adult Inpatient Plan of Care  Goal: Plan of Care Review  Outcome: Progressing  Goal: Patient-Specific Goal (Individualized)  Outcome: Progressing  Goal: Absence of Hospital-Acquired Illness or Injury  Outcome: Progressing     Problem: Diabetes Comorbidity  Goal: Blood Glucose Level Within Targeted Range  Outcome: Progressing     Problem: Sepsis/Septic Shock  Goal: Optimal Coping  Outcome: Progressing  Goal: Absence of Bleeding  Outcome: Progressing     Problem: Ventricular Assist Device  Goal: Optimal Adjustment to Device  Outcome: Progressing  Goal: Absence of Bleeding  Outcome: Progressing  Goal: Absence of Embolism Signs and Symptoms  Outcome: Progressing  Goal: Optimal Blood Flow  Outcome: Progressing     Problem: Fall Injury Risk  Goal: Absence of Fall and Fall-Related Injury  Outcome: Progressing

## 2025-01-15 NOTE — PROGRESS NOTES
"Pharmacokinetic Initial Assessment: IV Vancomycin    Assessment/Plan:    Initiate intravenous vancomycin with loading dose of 1750 mg once followed by a maintenance dose of vancomycin 1000 mg IV every 12 hours  Desired empiric serum trough concentration is 15 to 20 mcg/mL  Draw vancomycin trough level 60 min prior to fourth dose on 01/15/2025 at approximately 2030.  Pharmacy will continue to follow and monitor vancomycin.      Please contact pharmacy at extension [phone] with any questions regarding this assessment.     Thank you for the consult,   Griffin Story       Patient brief summary:  Kevan Queen is a 39 y.o. male initiated on antimicrobial therapy with IV Vancomycin for treatment of suspected sepsis    Drug Allergies:   Review of patient's allergies indicates:   Allergen Reactions    Bumex [bumetanide] Hives       Actual Body Weight:   74 kg    Renal Function:   Estimated Creatinine Clearance: 64.5 mL/min (A) (based on SCr of 1.61 mg/dL (H)).,     Dialysis Method (if applicable):  N/A    CBC (last 72 hours):  Recent Labs   Lab Result Units 01/14/25  0250   WBC x10(3)/mcL 27.21  27.21*   Hgb g/dL 8.8*   Hct % 31.0*   Platelet x10(3)/mcL 262   Monocytes % % 3       Metabolic Panel (last 72 hours):  Recent Labs   Lab Result Units 01/14/25  0250 01/14/25  0717 01/14/25  1003   Sodium mmol/L 137  --   --    Sodium, Blood Gas mmol/L  --   --  129*   Potassium mmol/L 3.5  --   --    Potassium, Blood Gas mmol/L  --   --  3.4*   Chloride mmol/L 102  --   --    CO2 mmol/L 22  --   --    Glucose mg/dL 167*  --   --    Glucose, UA   --  4+*  --    Blood Urea Nitrogen mg/dL 17.7  --   --    Creatinine mg/dL 1.61*  --   --    Albumin g/dL 2.6*  --   --    Bilirubin Total mg/dL 2.2*  --   --    ALP unit/L 191*  --   --    AST unit/L 32  --   --    ALT unit/L 12  --   --        Drug levels (last 3 results):  No results for input(s): "VANCOMYCINRA", "VANCORANDOM", "VANCOMYCINPE", "VANCOPEAK", "VANCOMYCINTR", " Department of Interventional Radiology  [phone]    History and Physical    Patient:  Marina Rueda MRN:  [id number]  SSN:  [id number]    YOB: 1962  Age:  62 y.o. Sex:  female      Primary Care Provider:  Ignacia Ferreira MD  Referring Physician:  Erma Lamb MD    Subjective:     Chief Complaint: liver mets    History of the Present Illness: The patient is a 62 y.o. female who presents for mesenteric arteriogram, MAA in preparation for Y90 radioembolization of a metastatic liver lesion. Treatment is scheduled for next week. She has a hx of metastatic breast cancer who previously has had an excellent response to systemic therapy, but now has a new single hypermetabolic tumor in the left lobe of her liver. She is NPO except for Pepcid and Oxycodone 5 mg. She c/o that the RUQ discomfort is a little more bothersome today. Past Medical History:   Diagnosis Date    Cancer [facility]) 2009    Breast     History of blood transfusion     Nephrolithiasis     required lithotripsy    Personal history of breast cancer 2012     Past Surgical History:   Procedure Laterality Date    HX BREAST AUGMENTATION Bilateral     HX  SECTION      x3    HX RADICAL MASTECTOMY Bilateral 2009    Bilateral for cancer        Review of Systems:    Pertinent items are noted in the History of Present Illness. Prior to Admission medications    Medication Sig Start Date End Date Taking? Authorizing Provider   oxyCODONE IR (ROXICODONE) 5 mg immediate release tablet Take 1 Tab by mouth two (2) times daily as needed for Pain for up to 30 days. Max Daily Amount: 10 mg. Indications: cancer pain 21  Caio Mahoney MD   LORazepam (ATIVAN) 0.5 mg tablet Take 1 Tab by mouth two (2) times daily as needed for Anxiety. Max Daily Amount: 1 mg. 21   Caio Mahoney MD   letrozole [facility]) 2.5 mg tablet Take 1 Tab by mouth daily.  21   Caio Mahoney MD   buPROPion XL ""VANCOTROUGH" in the last 72 hours.    Microbiologic Results:  Microbiology Results (last 7 days)       Procedure Component Value Units Date/Time    Culture, Anaerobe [[id number]]     Order Status: No result Specimen: Body Fluid from Abdomen     Aerobic culture [[id number]]     Order Status: No result Specimen: Incision site             " (WELLBUTRIN XL) 150 mg tablet Take 1 Tab by mouth every morning. 3/8/21   Leroy Almanza MD   famotidine (PEPCID) 20 mg tablet Take 1 Tab by mouth two (2) times a day. Indications: gastroesophageal reflux disease, heartburn 2/22/21   Leroy Almanza MD   palbociclib Betha Phlegm) 75 mg tab Take 75 mg by mouth daily. Take daily for 21 days and then off x 7 days 1/6/21   Leroy Almanza MD   ondansetron [facility] ODT) 8 mg disintegrating tablet Take 1 Tab by mouth every eight (8) hours as needed for Nausea. 1/2/20   Skyler Shahid NP   hydrocortisone (ANUSOL-HC) 25 mg supp INSERT 1 SUPPOSITORY BY RECTAL ROUTE AT BEDTIME X 7 NIGHTS THEN AS NEEDED. 3/28/19   Jackeline, Ivan   ondansetron hcl (ZOFRAN) 8 mg tablet Take 1 Tab by mouth every eight (8) hours as needed for Nausea. 9/12/18   Rona Cruz NP   prochlorperazine (COMPAZINE) 10 mg tablet Take 1 Tab by mouth every six (6) hours as needed. 9/13/17   Bolivar Mckay MD        Allergies   Allergen Reactions    Codeine Unknown (comments)     Pt can tolerate but does make nauseated.        Morphine Nausea and Vomiting       Family History   Problem Relation Age of Onset    Hypertension Mother     Arthritis-osteo Mother     Heart Disease Father     Hypertension Father     Elevated Lipids Father      Social History     Tobacco Use    Smoking status: Never Smoker    Smokeless tobacco: Never Used   Substance Use Topics    Alcohol use: Yes     Comment: Social.        Objective:       Physical Examination:    Vitals:    05/20/21 0711   BP: (!) 164/87   Pulse: 79   Resp: 18   Temp: 97.6 °F (36.4 °C)   SpO2: 93%       Pain Assessment  Pain Intensity 1: 5 (05/20/21 0709)   Loc: RUQ  Intervention: medications  Goal: 0            HEART: regular rate and rhythm  LUNG: clear to auscultation bilaterally  ABDOMEN: normal findings: soft, nontender with palpation  EXTREMITIES: warm, no edema    Laboratory:     Lab Results   Component Value Date/Time    Sodium 142 05/05/2021 03:30 PM    Sodium 137 04/06/2021 02:11 PM    Potassium 3.7 05/05/2021 03:30 PM    Potassium 3.6 04/06/2021 02:11 PM    Chloride 109 (H) 05/05/2021 03:30 PM    Chloride 104 04/06/2021 02:11 PM    CO2 26 05/05/2021 03:30 PM    CO2 28 04/06/2021 02:11 PM    Anion gap 7 05/05/2021 03:30 PM    Anion gap 5 (L) 04/06/2021 02:11 PM    Glucose 78 05/05/2021 03:30 PM    Glucose 120 (H) 04/06/2021 02:11 PM    BUN 11 05/05/2021 03:30 PM    BUN 13 04/06/2021 02:11 PM    Creatinine 0.68 05/05/2021 03:30 PM    Creatinine 0.80 04/06/2021 02:11 PM    GFR est AA >60 05/05/2021 03:30 PM    GFR est AA >60 04/06/2021 02:11 PM    GFR est non-AA >60 05/05/2021 03:30 PM    GFR est non-AA >60 04/06/2021 02:11 PM    Calcium 9.1 05/05/2021 03:30 PM    Calcium 8.9 04/06/2021 02:11 PM    Magnesium 2.0 03/01/2021 01:57 PM    Magnesium 2.0 01/25/2021 01:20 PM    Phosphorus 3.0 03/01/2021 01:57 PM    Phosphorus 2.4 (L) 10/05/2020 02:44 PM    Albumin 3.9 05/05/2021 03:30 PM    Albumin 4.1 04/06/2021 02:11 PM    Protein, total 7.2 05/05/2021 03:30 PM    Protein, total 7.7 04/06/2021 02:11 PM    Globulin 3.3 05/05/2021 03:30 PM    Globulin 3.6 (H) 04/06/2021 02:11 PM    A-G Ratio 1.2 05/05/2021 03:30 PM    A-G Ratio 1.1 (L) 04/06/2021 02:11 PM    ALT (SGPT) 23 05/05/2021 03:30 PM    ALT (SGPT) 30 04/06/2021 02:11 PM     Lab Results   Component Value Date/Time    WBC 2.6 (L) 05/05/2021 03:30 PM    WBC 3.1 (L) 04/06/2021 02:11 PM    HGB 13.5 05/05/2021 03:30 PM    HGB 14.8 04/06/2021 02:11 PM    HCT 38.0 05/05/2021 03:30 PM    HCT 42.7 04/06/2021 02:11 PM    PLATELET 791 (L) 79/15/0067 03:30 PM    PLATELET 198 26/62/9674 02:11 PM     Lab Results   Component Value Date/Time    aPTT 27.0 01/13/2019 03:16 PM    Prothrombin time 12.7 01/13/2019 03:16 PM    Prothrombin time 12.0 09/01/2017 12:45 AM    INR 1.0 01/13/2019 03:16 PM    INR 1.1 09/01/2017 12:45 AM       Assessment:   Breast cancer, liver metastasis (biopsy 5/10 consistent with breast carcinoma)    Hospital Problems  Date Reviewed: 1/25/2021    None          Plan:     Planned Procedure:  Mesenteric arteriogram, MAA, moderate sedation    Risks, benefits, and alternatives reviewed with patient and she agrees to proceed with the procedure.       Signed By: Lorrie Anderson PA-C     May 20, 2021

## 2025-01-15 NOTE — PROGRESS NOTES
01/15/2025  John Alaniz    Current provider:  Josh Ryan, *    Device interrogation:      1/15/2025     3:26 PM 1/15/2025    12:22 PM 1/15/2025     8:45 AM 1/15/2025     2:26 AM 1/8/2025     7:22 PM 1/8/2025     4:40 PM 1/8/2025    11:55 AM   TXP LVAD INTERROGATIONS   Type HeartMate3 HeartMate3 HeartMate3 HeartMate3 HeartMate3 HeartMate3    Flow 4.5 4.5 4.3 4.1 4.2 4.1 4.3   Speed 5100 5100 5150 5100 5100 5100 5100   PI 3.9 3.7 3.8 4.8 4.6 4.8 4.2   Power (Serna) 3.6 3.5 3.5 3.6 3.7 3.7 3.7   LSL 4700 4700 4700 4700 4700 4700 4700   Pulsatility No Pulse No Pulse No Pulse No Pulse No Pulse Intermittent pulse Intermittent pulse          Rounded on Kevan Queen to ensure all mechanical assist device settings (IABP or VAD) were appropriate and all parameters were within limits.  I was able to ensure all back up equipment was present, the staff had no issues, and the Perfusion Department daily rounding was complete.      For implantable VADs: Interrogation of Ventricular assist device was performed with analysis of device parameters and review of device function. I have personally reviewed the interrogation findings and agree with findings as stated.     In emergency, the nursing units have been notified to contact the perfusion department either by:  Calling [email or web address] from 630am to 4pm Mon thru Fri, utilizing the On-Call Finder functionality of Epic and searching for Perfusion, or by contacting the hospital  from 4pm to 630am and on weekends and asking to speak with the perfusionist on call.    4:15 PM

## 2025-01-15 NOTE — PROGRESS NOTES
Admit Note     Met with patient to assess needs. Patient is a 39 y.o. single male, admitted for Sepsis [A41.9] per medical record. Pt received LVAD on 6/3/22. Pt's mother does his dressing changes    Patient admitted from [facility] ED on 1/15/2025. Pt presents alone in room.  At this time, patient presents as alert and oriented x 4, pleasant, good eye contact, calm, communicative, cooperative, and asking and answering questions appropriately.      Household/Family Systems     Patient resides with patient's mother and pt's minor children at    Address:  [address] [place] [id number]    Significant other's address:  [address] [place] [address]      Pt's phone number: [phone]    Additional contacts  Malou Dumont, mother: [phone]  Dharmeshgladys (Page) Kesha, significant other: [phone]    Support system includes mother, significant other, and children. Pt has seven children, two with the pt's significant other. The pt's other children live with their mothers. Pt's family/mother care for children in the home when pt is hospitalized.     Patients primary caregiver is self with support from his mother.    During admission, patient's caregiver plans to stay at home.  Confirmed patient and patients caregivers do have access to reliable transportation.    Cognitive Status/Learning     Patient reports reading ability as college and states patient does not have difficulty with reading, writing, hearing, comprehension, learning, and memory.  Pt reports some difficulty with eyesight but it does not affect his ability to drive.  Patient reports patient learns best by multiple learning styles.   Needed: No.   Highest education level: Attended College/Technical School    Vocation/Disability   .  Working for Income: No  If no, reason not working: Disability  Patient is disabled due to cardiac issues since December 2024. Pt started receiving disability in December 2024.  Prior to disability,  patient  was employed as a CNA.    Adherence     Patient reports a high level of adherence to patients health care regimen. Pt takes his medications as prescribed and attends his doctor's appointments as scheduled. Pt reports dietary adherence. Adherence counseling and education provided. Patient verbalizes understanding.    Substance Use    Patient reports the following substance usage.    Tobacco: none, patient denies any use.  Alcohol: none, patient denies any use.  Illicit Drugs/Non-prescribed Medications: none, patient denies any use.    Patient states clear understanding of the potential impact of substance use.  Substance abstinence/cessation counseling, education and resources provided and reviewed.     Services Utilizing/ADLS    Infusion Service: Prior to admission, patient utilizing? no.  Pt utilized [facility] ph [phone] for [facility] in the past.  Home Health: Prior to admission, patient utilizing? no.  Pt utilizied [facility] ph [phone], fax: [phone] in the past and would use them again if needed  DME: Prior to admission, pt reports he has a wheelchair and a walker but does not use them.  Pulmonary/Cardiac Rehab: Prior to admission, no  Dialysis:  Prior to admission, no  Transplant Specialty Pharmacy:  Prior to admission, no.    Prior to admission, patient reports patient was independent with ADLS and was driving.  Patient reports patient is not able to care for self at this time due to compromised medical condition (as documented in medical record) and physical weakness..  Patient indicates a willingness to care for self once medically cleared to do so.    Insurance/Medications    Insured by   Payer/Plan Subscr  Sex Relation Sub. Ins. ID Effective Group Num   1. MEDICAID - HE* JOSEJ DAMICO* 1985 Male Self [id number]* 3/1/20 [id number]                                   [address] [address]      Primary Insurance (for UNOS reporting): Public Insurance - Medicaid  Secondary  Insurance (for UNOS reporting): None    Patient reports patient is able to obtain and afford medications at this time and at time of discharge.    Living Will/Healthcare Power of     Patient states patient has a LW and/or HCPA.  HPCA on file in Epic.  Niharika Wright is primary agent, Malou Tim and is secondary agent. Pt states he wants to change HCPA to his mother as primary agent. SW will return to pt's room with new forms tomorrow.    Coping/Mental Health    Patient is coping well with the aid of  family members.  Patient denies mental health difficulties. Pt has indicated mental health concerns in the past.  Pt reports no mental health needs or concerns at this time.    Discharge Planning    At time of discharge, patient plans to return to patient's home under the care of self with support from his mother.  Patients mother will transport patient.  Per rounds today, expected discharge date has not been medically determined at this time. Patient verbalizes understanding and are involved in treatment planning and discharge process.    Additional Concerns    Patient is being followed for needs, education, resources, information, emotional support, supportive counseling, and for supportive and skilled discharge plan of care.  providing ongoing psychosocial support, education, resources and d/c planning as needed.  SW remains available. Patient denies additional needs and/or concerns at this time. Patient verbalizes understanding and agreement with information reviewed, social work availability, and how to access available resources as needed.

## 2025-01-15 NOTE — CARE UPDATE
[facility] Care Support Interaction      I have reviewed the chart of Kevan Queen who is hospitalized for Severe sepsis. The patient is currently located in the following unit: CSU        I have assisted the primary physician in management of the following:      MRSA Decolonization - CHG ordered     Denise Espinoza PA-C  [facility]

## 2025-01-15 NOTE — HPI
Kevan Queen is a 39 year old male with stage D CHF due to NICM, polysubstance abuse, tobacco use, DM, underwent DT-HM3 implantation 6/23/2022 with early RV failure requiring RVAD with ProTek Duo after admitted with ADHF/cardiogenic shock on home milrinone requiring IABP. He underwent RVAD removal and chest closure 6/30/2022. He is off inotropes. He presents as a transfer from OSH for sepsis.    He has difficulty providing history, but states he was in his usual state of health this evening before developing nausea/abdominal pain after eating a pizza. He does not remember much further after this as he states he was altered after this time. Denies RAMIREZ, le edema or any HF symptoms. Denies LVAD alarms.  He was recently discharged 7D ago from this facility after being admitted for HF. States he has been compliant with medications since discharge.     At OSH ED, he was found to have elevated WBC count with BNP 1k. Lactate was originally elevated at 3 before normalizing. CT c/a/p demonstrated ascites and pleural effusions, but no obvious consolidation. UA was negative for nitrite or leukocytes. He was given vanc/zosyn, and then transferred to this facility for sepsis.

## 2025-01-15 NOTE — PROGRESS NOTES
Rounded on patient at bedside. Patient is A&Ox 4.  LVAD history interrogated with no abnormal events noted.  LVAD numbers WNL compared to baseline. Pt denies any needs at this time.  Patient reported having boxes of dressing supplies at home for discharge. Patient reported no equipment needs. Encouraged pt to notify nurse if they have any questions, problems or concerns for LVAD coordinator.  Pt verbalized understanding and in agreement of plan. Plan for team to continue to round on patient.

## 2025-01-15 NOTE — ED NOTES
Difficuty with O2 sat reading, pt awake and alert, resp RRR, pt states he is feeling much better than when he came to hospital.

## 2025-01-15 NOTE — NURSING
1452: RN received call from [facility] lab regarding positive Blood Cultures for this pt. All blood cultures positive for probable Staph. LUCÍA Lambert notified about the result.   1610: Second call from [facility] lab confirming Blood cultures positive for Staph aureus. LUCÍA Lambert made aware of the results. Per team okay to continue with IV Vancomycin therapy.

## 2025-01-15 NOTE — ASSESSMENT & PLAN NOTE
-unclear source, pna vs bacteremia vs SBP  -pending cultures obtained at OSH  -may benefit from diagnostic/therapeutic paracentesis, however, has already been started on abx  -obtain DL cx  -continue vanc/zosyn and de escalate as indicated

## 2025-01-15 NOTE — H&P
I have seen the patient, reviewed the fellow's assessment and plan. I have personally interviewed and examined the patient at bedside and: agree with the findings except I am not sure that he is septic. His abdomen is soft and non-tender.  Blood pressure appropriate post LVAD.  There is nothing to suggest peritonitis.  WBC certainly elevated with left shift.  Cultures pending     Patient seen early the morning after admission on Lasix 40 milligrams/hour.  Neck veins remain elevated to the jaw, 1+ pitting edema of the buttocks, liver span 16 cm.    He reports fluid built up over just a few days.  This seems as I estimate 30-40 lb of excess volume is present on the basis of today's examination.  He then admits that he has not been weighing. We reviewed how weights were obtained in heart failure in the importance of keeping a log and weighing daily.      Please see my accompanying procedure note for interrogation of the LVAD under separate cover.      We will continue aggressive diuresis.  Continuing Vancomycin pending cultures.    Post LVAD goals of care are to control HF.     See LVAD interrogation procedure note under separate cover          [facility] - Cardiac Intensive [facility]  Interventional Cardiology  H&P    Patient Name: Kevan Queen  MRN: [id number]  Admission Date: 1/15/2025  Code Status: Full Code   Attending Provider: Josh Ryan, *   Primary Care Physician: Rosio Armendariz FNP  Principal Problem:Severe sepsis    Patient information was obtained from patient and past medical records.     Subjective:     Chief Complaint:  abd pain      HPI:  Kevan Queen is a 39 year old male with stage D CHF due to NICM, polysubstance abuse, tobacco use, DM, underwent DT-HM3 implantation 6/23/2022 with early RV failure requiring RVAD with ProTek Duo after admitted with ADHF/cardiogenic shock on home milrinone requiring IABP. He underwent RVAD removal and chest closure 6/30/2022. He is off inotropes. He  presents as a transfer from OSH for sepsis.    He has difficulty providing history, but states he was in his usual state of health this evening before developing nausea/abdominal pain after eating a pizza. He does not remember much further after this as he states he was altered after this time. Denies RAMIREZ, le edema or any HF symptoms. Denies LVAD alarms.  He was recently discharged 7D ago from this facility after being admitted for HF. States he has been compliant with medications since discharge.     At OSH ED, he was found to have elevated WBC count with BNP 1k. Lactate was originally elevated at 3 before normalizing. CT c/a/p demonstrated ascites and pleural effusions, but no obvious consolidation. UA was negative for nitrite or leukocytes. He was given vanc/zosyn, and then transferred to this facility for sepsis.       Past Medical History:   Diagnosis Date    Acute respiratory failure with hypoxia 07/22/2023    Arthritis     Awareness alteration, transient 09/01/2022    Cardiomyopathy     CHF (congestive heart failure) 10/01/2020    COVID-19 06/03/2023    Diabetes mellitus     Dilated cardiomyopathy 10/26/2020    Drug abuse 10/2020    Headache 04/19/2023    Hyperglycemia 12/16/2022    Hyperosmolar hyperglycemic state (HHS) 05/25/2022    ICD (implantable cardioverter-defibrillator) in place 10/26/2020    Infected defibrillator now s/p removal Nov 2023 07/23/2023    Left ventricular assist device (LVAD) complication, initial encounter 06/05/2023    Muscle cramping 06/15/2022    Renal disorder     SOB (shortness of breath) 06/13/2022    Tingling in extremities 07/13/2022       Past Surgical History:   Procedure Laterality Date    APPLICATION OF WOUND VACUUM-ASSISTED CLOSURE DEVICE N/A 6/30/2022    Procedure: APPLICATION, WOUND VAC;  Surgeon: Luis F Paige MD;  Location: [facility] OR [address] [place] [address];  Service: Cardiovascular;  Laterality: N/A;  50 x 5 cm    CARDIAC DEFIBRILLATOR PLACEMENT      COLONOSCOPY N/A 9/25/2024     Procedure: COLONOSCOPY;  Surgeon: Drake Barton MD;  Location: [facility] ENDO (2ND FLR);  Service: Endoscopy;  Laterality: N/A;    ECHOCARDIOGRAM,TRANSESOPHAGEAL  11/9/2023    Procedure: Transesophageal echo (GUILLERMO) intra-procedure log documentation;  Surgeon: Eron Ivy MD;  Location: [facility] EP LAB;  Service: Cardiology;;    ESOPHAGOGASTRODUODENOSCOPY N/A 9/25/2024    Procedure: EGD (ESOPHAGOGASTRODUODENOSCOPY);  Surgeon: Drake Barton MD;  Location: [facility] ENDO (2ND FLR);  Service: Endoscopy;  Laterality: N/A;    EXTRACTION, ELECTRODE LEAD Left 11/9/2023    Procedure: EXTRACTION, ELECTRODE LEAD;  Surgeon: ADALID Leon MD;  Location: [facility] EP LAB;  Service: Cardiology;  Laterality: Left;  INFECTION, LEAD EXTRACTION, GUILLERMO, BSCI, ANES, EH,   *NO CTS BACKUP*    IMPLANTATION OF RIGHT VENTRICULAR ASSIST DEVICE (RVAD) N/A 6/29/2022    Procedure: INSERTION, RVAD;  Surgeon: Luis F Paige MD;  [facility];  Service: Cardiovascular;  Laterality: N/A;    INSERTION OF GRAFT TO PERICARDIUM Right 6/30/2022    Procedure: INSERTION-RIGHT VENTRICULAR ASSIST DEVICE;  Surgeon: Luis F Paige MD;  [facility];  Service: Cardiovascular;  Laterality: Right;    IRRIGATION OF MEDIASTINUM  6/30/2022    Procedure: IRRIGATION, MEDIASTINUM;  Surgeon: Luis F Paige MD;  [facility];  Service: Cardiovascular;;    LEFT VENTRICULAR ASSIST DEVICE Left 6/23/2022    Procedure: INSERTION-LEFT VENTRICULAR ASSIST DEVICE;  Surgeon: Luis F Paige MD;  [facility];  Service: Cardiovascular;  Laterality: Left;    LEFT VENTRICULAR ASSIST DEVICE N/A 6/29/2022    Procedure: INSERTION-LEFT VENTRICULAR ASSIST DEVICE;  Surgeon: Luis F Paige MD;  [facility];  Service: Cardiovascular;  Laterality: N/A;    REVISION OF SKIN POCKET FOR CARDIOVERTER-DEFIBRILLATOR  11/9/2023    Procedure: REVISION, SKIN POCKET, FOR CARDIOVERTER-DEFIBRILLATOR;  Surgeon: ADALID Leon MD;   Location: [facility] EP LAB;  Service: Cardiology;;    RIGHT HEART CATHETERIZATION Right 4/8/2022    Procedure: INSERTION, CATHETER, RIGHT HEART;  Surgeon: Luca Lopez Jr., MD;  Location: [facility] CATH LAB;  Service: Cardiology;  Laterality: Right;    RIGHT HEART CATHETERIZATION Right 4/19/2022    Procedure: INSERTION, CATHETER, RIGHT HEART;  Surgeon: Josh Pulido MD;  Location: [facility] CATH LAB;  Service: Cardiology;  Laterality: Right;    RIGHT HEART CATHETERIZATION Right 7/21/2022    Procedure: INSERTION, CATHETER, RIGHT HEART;  Surgeon: Dalia Crum MD;  Location: [facility] CATH LAB;  Service: Cardiology;  Laterality: Right;    RIGHT HEART CATHETERIZATION Right 10/31/2022    Procedure: INSERTION, CATHETER, RIGHT HEART;  Surgeon: Dalia Crum MD;  Location: [facility] CATH LAB;  Service: Cardiology;  Laterality: Right;    STERNAL WOUND CLOSURE N/A 6/30/2022    Procedure: CLOSURE, WOUND, STERNUM;  Surgeon: Luis F Paige MD;  Location: [facility] OR [facility] FLR;  Service: Cardiovascular;  Laterality: N/A;       Review of patient's allergies indicates:   Allergen Reactions    Bumex [bumetanide] Hives       PTA Medications   Medication Sig    acetaminophen (TYLENOL) 325 MG tablet Take 2 tablets (650 mg total) by mouth every 6 (six) hours as needed for Pain.    amiodarone (PACERONE) 200 MG Tab Take 1 tablet (200 mg total) by mouth once daily.    amLODIPine (NORVASC) 10 MG tablet Take 1 tablet (10 mg total) by mouth once daily.    aspirin (ECOTRIN) 81 MG EC tablet Take 1 tablet (81 mg total) by mouth once daily.    atorvastatin (LIPITOR) 40 MG tablet Take 1 tablet (40 mg total) by mouth once daily.    blood sugar diagnostic Strp Use to test blood glucose 4 (four) times daily.    blood-glucose meter (TRUE METRIX GLUCOSE METER) Misc Use to test blood glucose 4 (four) times daily.    blood-glucose sensor (FREESTYLE LUCIUS 3 SENSOR) Dee 1 Device by Misc.(Non-Drug; Combo Route) route every 14 (fourteen) days.    cefadroxil (DURICEF)  "500 MG Cap Take 1 capsule (500 mg total) by mouth every 12 (twelve) hours.    DULoxetine (CYMBALTA) 30 MG capsule Take 1 capsule (30 mg total) by mouth once daily.    empagliflozin (JARDIANCE) 10 mg tablet Take 1 tablet (10 mg total) by mouth once daily.    eplerenone (INSPRA) 25 MG Tab Take 2 tablets (50 mg total) by mouth once daily.    gabapentin (NEURONTIN) 100 MG capsule Take 1 capsule (100 mg total) by mouth 2 (two) times daily AND 4 capsules (400 mg total) every evening.    glucagon (BAQSIMI) 3 mg/actuation Spry 1 each by Nasal route as needed (hypoglycemia).    insulin aspart U-100 (NOVOLOG) 100 unit/mL (3 mL) InPn pen Inject 18 Units into the skin 3 (three) times daily with meals: MAX 94 units/daily  150-200    201-250    251-300    301-350    >350  +2 units   +4 units    +6 units    +8 units    +10 units    insulin detemir U-100, Levemir, 100 unit/mL (3 mL) SubQ InPn pen Inject 10 Units into the skin 2 (two) times daily. In the morning and before bed.    lancets 33 gauge Misc Use to test blood glucose 4 (four) times daily.    levETIRAcetam (KEPPRA) 1000 MG tablet Take 1.5 tablets (1,500 mg total) by mouth 2 (two) times daily.    magnesium oxide (MAG-OX) 400 mg (241.3 mg magnesium) tablet Take 1 tablet (400 mg total) by mouth once daily.    ondansetron (ZOFRAN-ODT) 4 MG TbDL Take 1 tablet (4 mg total) by mouth every 8 (eight) hours as needed (nausea).    pantoprazole (PROTONIX) 40 MG tablet Take 1 tablet (40 mg total) by mouth once daily.    pen needle, diabetic 32 gauge x 5/32" Ndle 1 each by Misc.(Non-Drug; Combo Route) route 4 (four) times daily.    polyethylene glycol (GLYCOLAX) 17 gram PwPk Take 17 g by mouth once daily.    potassium chloride SA (K-DUR,KLOR-CON M) 10 MEQ tablet Take 3 tablets (30 mEq total) by mouth once daily.    senna-docusate 8.6-50 mg (PERICOLACE) 8.6-50 mg per tablet Take 1 tablet by mouth daily as needed for Constipation.    torsemide (DEMADEX) 20 MG Tab Take 3 tablets (60 mg " total) by mouth 2 (two) times a day.    warfarin (COUMADIN) 3 MG tablet Take 0.5 tablets (1.5 mg total) by mouth Daily.     Family History       Problem Relation (Age of Onset)    Diverticulosis Brother    Heart attack Maternal Grandmother, Maternal Grandfather    Heart failure Father          Tobacco Use    Smoking status: Former     Current packs/day: 0.00     Average packs/day: 0.5 packs/day for 16.6 years (8.3 ttl pk-yrs)     Types: Cigarettes     Start date: 10/1/2004     Quit date: 4/23/2021     Years since quitting: 3.7    Smokeless tobacco: Never   Substance and Sexual Activity    Alcohol use: Not Currently    Drug use: Not Currently     Types: Marijuana, MDMA (Ecstacy)    Sexual activity: Yes     Partners: Female     Birth control/protection: None     Review of Systems   Gastrointestinal:  Positive for abdominal pain and nausea.   All other systems reviewed and are negative.    Objective:     Vital Signs (Most Recent):  Temp: 98.9 °F (37.2 °C) (01/15/25 0330)  Resp: 18 (01/15/25 0300)  BP: (!) 86/0 (01/15/25 0230) Vital Signs (24h Range):  Temp:  [97.9 °F (36.6 °C)-98.9 °F (37.2 °C)] 98.9 °F (37.2 °C)  Pulse:  [] 93  Resp:  [13-23] 18  SpO2:  [79 %-100 %] 95 %  BP: (86)/(0) 86/0     Weight: 74 kg (163 lb 2.3 oz)  Body mass index is 20.39 kg/m².            No intake or output data in the 24 hours ending 01/15/25 0336    Lines/Drains/Airways       Line  Duration                  VAD 06/29/22 1115 Left ventricular assist device HeartMate 3 930 days                     Physical Exam  Vitals and nursing note reviewed.   Constitutional:       Appearance: Normal appearance.   HENT:      Head: Normocephalic and atraumatic.      Right Ear: External ear normal.      Left Ear: External ear normal.      Nose: Nose normal.      Mouth/Throat:      Mouth: Mucous membranes are moist.   Eyes:      Pupils: Pupils are equal, round, and reactive to light.   Cardiovascular:      Rate and Rhythm: Normal rate and regular  rhythm.      Pulses: Normal pulses.      Comments: VAD hum  Pulmonary:      Effort: Pulmonary effort is normal.   Abdominal:      General: Abdomen is flat.   Musculoskeletal:         General: Normal range of motion.      Cervical back: Normal range of motion.      Right lower leg: No edema.      Left lower leg: No edema.   Skin:     General: Skin is warm and dry.      Capillary Refill: Capillary refill takes less than 2 seconds.   Neurological:      General: No focal deficit present.      Mental Status: He is alert and oriented to person, place, and time.            Significant Labs: All pertinent lab results from the last 24 hours have been reviewed.    Significant Imaging:  reviewed  Assessment and Plan:     Neuro  Temporal lobe seizure  -continue keppra     Cardiac/Vascular  Atrial flutter  -continue amiodarone and warfarin     HTN (hypertension)  -continue amlodipine and eplerenone     Right heart failure secondary to left heart failure-post LVAD surgery  -continue diuresis with torsemide     LVAD (left ventricular assist device) present  -continue warfarin and asa  -RPM at 5100    Chronic combined systolic and diastolic heart failure  -chronic, NYHA II  -continue torsemide and GDMT with SLGTi and eplerenone       ID  * Severe sepsis  -unclear source, pna vs bacteremia vs SBP  -pending cultures obtained at OSH  -may benefit from diagnostic/therapeutic paracentesis, however, has already been started on abx  -obtain DL cx  -continue vanc/zosyn and de escalate as indicated     Endocrine  Type 2 diabetes mellitus with hyperglycemia, with long-term current use of insulin  -home insulin dose reduced, continue ssi     GI  Ascites  -seen via CT, which is chronic   -suspect 2/2 RV failure  -continue diuresis         VTE Risk Mitigation (From admission, onward)           Ordered     warfarin split tablet 1.5 mg  Daily         01/15/25 0312                      Tejinder Herman MD  Interventional Cardiology   [facility] -  Cardiac Intensive Care

## 2025-01-16 LAB
ANION GAP SERPL CALC-SCNC: 12 MMOL/L (ref 8–16)
APTT PPP: 33 SEC (ref 21–32)
BASOPHILS # BLD AUTO: 0.03 K/UL (ref 0–0.2)
BASOPHILS NFR BLD: 0.3 % (ref 0–1.9)
BUN SERPL-MCNC: 17 MG/DL (ref 6–20)
CALCIUM SERPL-MCNC: 8.6 MG/DL (ref 8.7–10.5)
CHLORIDE SERPL-SCNC: 97 MMOL/L (ref 95–110)
CO2 SERPL-SCNC: 27 MMOL/L (ref 23–29)
CREAT SERPL-MCNC: 1.2 MG/DL (ref 0.5–1.4)
DIFFERENTIAL METHOD BLD: ABNORMAL
EOSINOPHIL # BLD AUTO: 0.1 K/UL (ref 0–0.5)
EOSINOPHIL NFR BLD: 0.5 % (ref 0–8)
ERYTHROCYTE [DISTWIDTH] IN BLOOD BY AUTOMATED COUNT: 23.8 % (ref 11.5–14.5)
EST. GFR  (NO RACE VARIABLE): >60 ML/MIN/1.73 M^2
GLUCOSE SERPL-MCNC: 101 MG/DL (ref 70–110)
HCT VFR BLD AUTO: 28.4 % (ref 40–54)
HGB BLD-MCNC: 8.3 G/DL (ref 14–18)
IMM GRANULOCYTES # BLD AUTO: 0.04 K/UL (ref 0–0.04)
IMM GRANULOCYTES NFR BLD AUTO: 0.4 % (ref 0–0.5)
INR PPP: 2 (ref 0.8–1.2)
LDH SERPL L TO P-CCNC: 239 U/L (ref 110–260)
LYMPHOCYTES # BLD AUTO: 1 K/UL (ref 1–4.8)
LYMPHOCYTES NFR BLD: 9.4 % (ref 18–48)
MAGNESIUM SERPL-MCNC: 1.9 MG/DL (ref 1.6–2.6)
MAGNESIUM SERPL-MCNC: 2.5 MG/DL (ref 1.6–2.6)
MCH RBC QN AUTO: 18.1 PG (ref 27–31)
MCHC RBC AUTO-ENTMCNC: 29.2 G/DL (ref 32–36)
MCV RBC AUTO: 62 FL (ref 82–98)
MONOCYTES # BLD AUTO: 1 K/UL (ref 0.3–1)
MONOCYTES NFR BLD: 9.1 % (ref 4–15)
NEUTROPHILS # BLD AUTO: 8.8 K/UL (ref 1.8–7.7)
NEUTROPHILS NFR BLD: 80.3 % (ref 38–73)
NRBC BLD-RTO: 0 /100 WBC
PHOSPHATE SERPL-MCNC: 3.7 MG/DL (ref 2.7–4.5)
PLATELET # BLD AUTO: 228 K/UL (ref 150–450)
PMV BLD AUTO: ABNORMAL FL (ref 9.2–12.9)
POCT GLUCOSE: 121 MG/DL (ref 70–110)
POCT GLUCOSE: 188 MG/DL (ref 70–110)
POCT GLUCOSE: 210 MG/DL (ref 70–110)
POCT GLUCOSE: 238 MG/DL (ref 70–110)
POTASSIUM SERPL-SCNC: 3.2 MMOL/L (ref 3.5–5.1)
POTASSIUM SERPL-SCNC: 3.4 MMOL/L (ref 3.5–5.1)
PROTHROMBIN TIME: 21.3 SEC (ref 9–12.5)
RBC # BLD AUTO: 4.58 M/UL (ref 4.6–6.2)
SODIUM SERPL-SCNC: 136 MMOL/L (ref 136–145)
WBC # BLD AUTO: 10.98 K/UL (ref 3.9–12.7)

## 2025-01-16 PROCEDURE — 25000003 PHARM REV CODE 250: Performed by: NURSE PRACTITIONER

## 2025-01-16 PROCEDURE — 83735 ASSAY OF MAGNESIUM: CPT | Mod: 91 | Performed by: NURSE PRACTITIONER

## 2025-01-16 PROCEDURE — 36415 COLL VENOUS BLD VENIPUNCTURE: CPT | Performed by: STUDENT IN AN ORGANIZED HEALTH CARE EDUCATION/TRAINING PROGRAM

## 2025-01-16 PROCEDURE — 27000248 HC VAD-ADDITIONAL DAY

## 2025-01-16 PROCEDURE — 63600175 PHARM REV CODE 636 W HCPCS: Performed by: STUDENT IN AN ORGANIZED HEALTH CARE EDUCATION/TRAINING PROGRAM

## 2025-01-16 PROCEDURE — 87040 BLOOD CULTURE FOR BACTERIA: CPT | Performed by: NURSE PRACTITIONER

## 2025-01-16 PROCEDURE — 25000003 PHARM REV CODE 250: Performed by: INTERNAL MEDICINE

## 2025-01-16 PROCEDURE — 20600001 HC STEP DOWN PRIVATE ROOM

## 2025-01-16 PROCEDURE — 85025 COMPLETE CBC W/AUTO DIFF WBC: CPT | Performed by: NURSE PRACTITIONER

## 2025-01-16 PROCEDURE — 99233 SBSQ HOSP IP/OBS HIGH 50: CPT | Mod: 95,,, | Performed by: NURSE PRACTITIONER

## 2025-01-16 PROCEDURE — 83615 LACTATE (LD) (LDH) ENZYME: CPT | Performed by: STUDENT IN AN ORGANIZED HEALTH CARE EDUCATION/TRAINING PROGRAM

## 2025-01-16 PROCEDURE — 85730 THROMBOPLASTIN TIME PARTIAL: CPT | Performed by: STUDENT IN AN ORGANIZED HEALTH CARE EDUCATION/TRAINING PROGRAM

## 2025-01-16 PROCEDURE — 36415 COLL VENOUS BLD VENIPUNCTURE: CPT | Performed by: NURSE PRACTITIONER

## 2025-01-16 PROCEDURE — 93750 INTERROGATION VAD IN PERSON: CPT | Mod: ,,, | Performed by: INTERNAL MEDICINE

## 2025-01-16 PROCEDURE — 85610 PROTHROMBIN TIME: CPT | Performed by: NURSE PRACTITIONER

## 2025-01-16 PROCEDURE — 25000003 PHARM REV CODE 250: Performed by: STUDENT IN AN ORGANIZED HEALTH CARE EDUCATION/TRAINING PROGRAM

## 2025-01-16 PROCEDURE — 63600175 PHARM REV CODE 636 W HCPCS: Performed by: INTERNAL MEDICINE

## 2025-01-16 PROCEDURE — 84100 ASSAY OF PHOSPHORUS: CPT | Performed by: STUDENT IN AN ORGANIZED HEALTH CARE EDUCATION/TRAINING PROGRAM

## 2025-01-16 PROCEDURE — 99223 1ST HOSP IP/OBS HIGH 75: CPT | Mod: ,,, | Performed by: STUDENT IN AN ORGANIZED HEALTH CARE EDUCATION/TRAINING PROGRAM

## 2025-01-16 PROCEDURE — 80048 BASIC METABOLIC PNL TOTAL CA: CPT | Performed by: NURSE PRACTITIONER

## 2025-01-16 PROCEDURE — 63600175 PHARM REV CODE 636 W HCPCS: Performed by: NURSE PRACTITIONER

## 2025-01-16 PROCEDURE — 84132 ASSAY OF SERUM POTASSIUM: CPT | Performed by: NURSE PRACTITIONER

## 2025-01-16 RX ORDER — POTASSIUM CHLORIDE 20 MEQ/1
20 TABLET, EXTENDED RELEASE ORAL 2 TIMES DAILY
Status: DISCONTINUED | OUTPATIENT
Start: 2025-01-16 | End: 2025-01-20

## 2025-01-16 RX ORDER — METOLAZONE 5 MG/1
10 TABLET ORAL ONCE
Status: COMPLETED | OUTPATIENT
Start: 2025-01-16 | End: 2025-01-16

## 2025-01-16 RX ORDER — MAGNESIUM SULFATE HEPTAHYDRATE 40 MG/ML
2 INJECTION, SOLUTION INTRAVENOUS ONCE
Status: COMPLETED | OUTPATIENT
Start: 2025-01-16 | End: 2025-01-16

## 2025-01-16 RX ORDER — POTASSIUM CHLORIDE 20 MEQ/1
40 TABLET, EXTENDED RELEASE ORAL ONCE
Status: COMPLETED | OUTPATIENT
Start: 2025-01-16 | End: 2025-01-16

## 2025-01-16 RX ORDER — POTASSIUM CHLORIDE 20 MEQ/1
20 TABLET, EXTENDED RELEASE ORAL ONCE
Status: COMPLETED | OUTPATIENT
Start: 2025-01-16 | End: 2025-01-16

## 2025-01-16 RX ORDER — METHOCARBAMOL 500 MG/1
500 TABLET, FILM COATED ORAL 4 TIMES DAILY
Status: DISCONTINUED | OUTPATIENT
Start: 2025-01-16 | End: 2025-01-25 | Stop reason: HOSPADM

## 2025-01-16 RX ADMIN — MUPIROCIN: 20 OINTMENT TOPICAL at 08:01

## 2025-01-16 RX ADMIN — METHOCARBAMOL 500 MG: 500 TABLET ORAL at 08:01

## 2025-01-16 RX ADMIN — POTASSIUM CHLORIDE 20 MEQ: 1500 TABLET, EXTENDED RELEASE ORAL at 08:01

## 2025-01-16 RX ADMIN — GABAPENTIN 100 MG: 100 CAPSULE ORAL at 08:01

## 2025-01-16 RX ADMIN — FUROSEMIDE 40 MG/HR: 10 INJECTION, SOLUTION INTRAMUSCULAR; INTRAVENOUS at 10:01

## 2025-01-16 RX ADMIN — MAGNESIUM SULFATE HEPTAHYDRATE 2 G: 40 INJECTION, SOLUTION INTRAVENOUS at 12:01

## 2025-01-16 RX ADMIN — INSULIN ASPART 10 UNITS: 100 INJECTION, SOLUTION INTRAVENOUS; SUBCUTANEOUS at 05:01

## 2025-01-16 RX ADMIN — INSULIN GLARGINE 10 UNITS: 100 INJECTION, SOLUTION SUBCUTANEOUS at 08:01

## 2025-01-16 RX ADMIN — AMIODARONE HYDROCHLORIDE 200 MG: 200 TABLET ORAL at 08:01

## 2025-01-16 RX ADMIN — PANTOPRAZOLE SODIUM 40 MG: 40 TABLET, DELAYED RELEASE ORAL at 08:01

## 2025-01-16 RX ADMIN — LEVETIRACETAM 1500 MG: 500 TABLET, FILM COATED ORAL at 08:01

## 2025-01-16 RX ADMIN — POTASSIUM CHLORIDE 20 MEQ: 1500 TABLET, EXTENDED RELEASE ORAL at 07:01

## 2025-01-16 RX ADMIN — EMPAGLIFLOZIN 10 MG: 10 TABLET, FILM COATED ORAL at 08:01

## 2025-01-16 RX ADMIN — METHOCARBAMOL 500 MG: 500 TABLET ORAL at 05:01

## 2025-01-16 RX ADMIN — PIPERACILLIN SODIUM AND TAZOBACTAM SODIUM 4.5 G: 4; .5 INJECTION, POWDER, FOR SOLUTION INTRAVENOUS at 03:01

## 2025-01-16 RX ADMIN — POTASSIUM CHLORIDE 20 MEQ: 1500 TABLET, EXTENDED RELEASE ORAL at 12:01

## 2025-01-16 RX ADMIN — VANCOMYCIN HYDROCHLORIDE 750 MG: 750 INJECTION, POWDER, LYOPHILIZED, FOR SOLUTION INTRAVENOUS at 11:01

## 2025-01-16 RX ADMIN — PIPERACILLIN SODIUM AND TAZOBACTAM SODIUM 4.5 G: 4; .5 INJECTION, POWDER, FOR SOLUTION INTRAVENOUS at 12:01

## 2025-01-16 RX ADMIN — Medication 400 MG: at 08:01

## 2025-01-16 RX ADMIN — INSULIN ASPART 4 UNITS: 100 INJECTION, SOLUTION INTRAVENOUS; SUBCUTANEOUS at 08:01

## 2025-01-16 RX ADMIN — METOLAZONE 10 MG: 5 TABLET ORAL at 08:01

## 2025-01-16 RX ADMIN — INSULIN ASPART 10 UNITS: 100 INJECTION, SOLUTION INTRAVENOUS; SUBCUTANEOUS at 08:01

## 2025-01-16 RX ADMIN — WARFARIN SODIUM 1.5 MG: 1 TABLET ORAL at 05:01

## 2025-01-16 RX ADMIN — METHOCARBAMOL 500 MG: 500 TABLET ORAL at 12:01

## 2025-01-16 RX ADMIN — DULOXETINE HYDROCHLORIDE 30 MG: 30 CAPSULE, DELAYED RELEASE ORAL at 08:01

## 2025-01-16 RX ADMIN — DEXTROSE MONOHYDRATE 6 G: 25000 INJECTION, SOLUTION INTRAVENOUS at 04:01

## 2025-01-16 RX ADMIN — ATORVASTATIN CALCIUM 40 MG: 40 TABLET, FILM COATED ORAL at 08:01

## 2025-01-16 RX ADMIN — INSULIN ASPART 2 UNITS: 100 INJECTION, SOLUTION INTRAVENOUS; SUBCUTANEOUS at 01:01

## 2025-01-16 RX ADMIN — INSULIN ASPART 10 UNITS: 100 INJECTION, SOLUTION INTRAVENOUS; SUBCUTANEOUS at 01:01

## 2025-01-16 RX ADMIN — POTASSIUM CHLORIDE 20 MEQ: 1500 TABLET, EXTENDED RELEASE ORAL at 09:01

## 2025-01-16 RX ADMIN — GABAPENTIN 400 MG: 400 CAPSULE ORAL at 08:01

## 2025-01-16 RX ADMIN — AMLODIPINE BESYLATE 10 MG: 10 TABLET ORAL at 08:01

## 2025-01-16 RX ADMIN — EPLERENONE 50 MG: 25 TABLET, FILM COATED ORAL at 08:01

## 2025-01-16 RX ADMIN — ASPIRIN 81 MG: 81 TABLET, COATED ORAL at 08:01

## 2025-01-16 RX ADMIN — POTASSIUM CHLORIDE 40 MEQ: 1500 TABLET, EXTENDED RELEASE ORAL at 07:01

## 2025-01-16 NOTE — NURSING
Notified Amy.NP pt c/o bad cramping in BLE and BUE, muscle relaxant ordered and lasix gtt dose decreased to 20 mg/hr.

## 2025-01-16 NOTE — PROGRESS NOTES
Update    Pt presents in room aaox4 with open affect. Provided pt with blank HCPA form and a pen. Pt states he will complete and SW can come back tomorrow. Pt reports coping well and denies further needs, questions, concerns at this time and none indicated. Providing ongoing psychosocial and counseling support, education, resources, assistance and discharge planning as indicated. Following and available.

## 2025-01-16 NOTE — SUBJECTIVE & OBJECTIVE
Past Medical History:   Diagnosis Date    Acute respiratory failure with hypoxia 07/22/2023    Arthritis     Awareness alteration, transient 09/01/2022    Cardiomyopathy     CHF (congestive heart failure) 10/01/2020    COVID-19 06/03/2023    Diabetes mellitus     Dilated cardiomyopathy 10/26/2020    Drug abuse 10/2020    Headache 04/19/2023    Hyperglycemia 12/16/2022    Hyperosmolar hyperglycemic state (HHS) 05/25/2022    ICD (implantable cardioverter-defibrillator) in place 10/26/2020    Infected defibrillator now s/p removal Nov 2023 07/23/2023    Left ventricular assist device (LVAD) complication, initial encounter 06/05/2023    Muscle cramping 06/15/2022    Renal disorder     SOB (shortness of breath) 06/13/2022    Tingling in extremities 07/13/2022       Past Surgical History:   Procedure Laterality Date    APPLICATION OF WOUND VACUUM-ASSISTED CLOSURE DEVICE N/A 6/30/2022    Procedure: APPLICATION, WOUND VAC;  Surgeon: Luis F Paige MD;  Location: [facility] OR [facility] FLR;  Service: Cardiovascular;  Laterality: N/A;  50 x 5 cm    CARDIAC DEFIBRILLATOR PLACEMENT      COLONOSCOPY N/A 9/25/2024    Procedure: COLONOSCOPY;  Surgeon: Drake Barton MD;  Location: [facility] ENDO (2ND FLR);  Service: Endoscopy;  Laterality: N/A;    ECHOCARDIOGRAM,TRANSESOPHAGEAL  11/9/2023    Procedure: Transesophageal echo (GUILLERMO) intra-procedure log documentation;  Surgeon: Eron Ivy MD;  Location: [facility] EP LAB;  Service: Cardiology;;    ESOPHAGOGASTRODUODENOSCOPY N/A 9/25/2024    Procedure: EGD (ESOPHAGOGASTRODUODENOSCOPY);  Surgeon: Drake Barton MD;  Location: [facility] ENDO (2ND FLR);  Service: Endoscopy;  Laterality: N/A;    EXTRACTION, ELECTRODE LEAD Left 11/9/2023    Procedure: EXTRACTION, ELECTRODE LEAD;  Surgeon: ADALID Leon MD;  Location: [facility] EP LAB;  Service: Cardiology;  Laterality: Left;  INFECTION, LEAD EXTRACTION, GUILLERMO, BSCI, ANES, EH,   *NO CTS BACKUP*    IMPLANTATION OF RIGHT VENTRICULAR ASSIST DEVICE (RVAD)  N/A 6/29/2022    Procedure: INSERTION, RVAD;  Surgeon: Luis F Paige MD;  Location: [facility] OR 2ND FLR;  Service: Cardiovascular;  Laterality: N/A;    INSERTION OF GRAFT TO PERICARDIUM Right 6/30/2022    Procedure: INSERTION-RIGHT VENTRICULAR ASSIST DEVICE;  Surgeon: Luis F Paige MD;  Location: [facility] OR 2ND FLR;  Service: Cardiovascular;  Laterality: Right;    IRRIGATION OF MEDIASTINUM  6/30/2022    Procedure: IRRIGATION, MEDIASTINUM;  Surgeon: Luis F Paige MD;  Location: [facility] OR 2ND FLR;  Service: Cardiovascular;;    LEFT VENTRICULAR ASSIST DEVICE Left 6/23/2022    Procedure: INSERTION-LEFT VENTRICULAR ASSIST DEVICE;  Surgeon: Luis F Paige MD;  Location: [facility] OR 2ND FLR;  Service: Cardiovascular;  Laterality: Left;    LEFT VENTRICULAR ASSIST DEVICE N/A 6/29/2022    Procedure: INSERTION-LEFT VENTRICULAR ASSIST DEVICE;  Surgeon: Luis F Paige MD;  Location: [facility] OR 2ND FLR;  Service: Cardiovascular;  Laterality: N/A;    REVISION OF SKIN POCKET FOR CARDIOVERTER-DEFIBRILLATOR  11/9/2023    Procedure: REVISION, SKIN POCKET, FOR CARDIOVERTER-DEFIBRILLATOR;  Surgeon: ADALID Leon MD;  Location: [facility] EP LAB;  Service: Cardiology;;    RIGHT HEART CATHETERIZATION Right 4/8/2022    Procedure: INSERTION, CATHETER, RIGHT HEART;  Surgeon: Luca Lopez Jr., MD;  Location: [facility] CATH LAB;  Service: Cardiology;  Laterality: Right;    RIGHT HEART CATHETERIZATION Right 4/19/2022    Procedure: INSERTION, CATHETER, RIGHT HEART;  Surgeon: Josh Pulido MD;  Location: [facility] CATH LAB;  Service: Cardiology;  Laterality: Right;    RIGHT HEART CATHETERIZATION Right 7/21/2022    Procedure: INSERTION, CATHETER, RIGHT HEART;  Surgeon: Dalia Crum MD;  Location: [facility] CATH LAB;  Service: Cardiology;  Laterality: Right;    RIGHT HEART CATHETERIZATION Right 10/31/2022    Procedure: INSERTION, CATHETER, RIGHT HEART;  Surgeon: Dalia Crmu MD;  Location: [facility] CATH LAB;  Service: Cardiology;  Laterality: Right;     STERNAL WOUND CLOSURE N/A 6/30/2022    Procedure: CLOSURE, WOUND, STERNUM;  Surgeon: Luis F Paige MD;  Location: [facility] OR [address] [place] [address];  Service: Cardiovascular;  Laterality: N/A;       Review of patient's allergies indicates:   Allergen Reactions    Bumex [bumetanide] Hives       Medications:  Medications Prior to Admission   Medication Sig    acetaminophen (TYLENOL) 325 MG tablet Take 2 tablets (650 mg total) by mouth every 6 (six) hours as needed for Pain.    amiodarone (PACERONE) 200 MG Tab Take 1 tablet (200 mg total) by mouth once daily.    amLODIPine (NORVASC) 10 MG tablet Take 1 tablet (10 mg total) by mouth once daily.    aspirin (ECOTRIN) 81 MG EC tablet Take 1 tablet (81 mg total) by mouth once daily.    atorvastatin (LIPITOR) 40 MG tablet Take 1 tablet (40 mg total) by mouth once daily.    blood sugar diagnostic Strp Use to test blood glucose 4 (four) times daily.    blood-glucose meter (TRUE METRIX GLUCOSE METER) Misc Use to test blood glucose 4 (four) times daily.    blood-glucose sensor (FREESTYLE LUCIUS 3 SENSOR) Dee 1 Device by Misc.(Non-Drug; Combo Route) route every 14 (fourteen) days.    cefadroxil (DURICEF) 500 MG Cap Take 1 capsule (500 mg total) by mouth every 12 (twelve) hours.    DULoxetine (CYMBALTA) 30 MG capsule Take 1 capsule (30 mg total) by mouth once daily.    empagliflozin (JARDIANCE) 10 mg tablet Take 1 tablet (10 mg total) by mouth once daily.    eplerenone (INSPRA) 25 MG Tab Take 2 tablets (50 mg total) by mouth once daily.    gabapentin (NEURONTIN) 100 MG capsule Take 1 capsule (100 mg total) by mouth 2 (two) times daily AND 4 capsules (400 mg total) every evening.    glucagon (BAQSIMI) 3 mg/actuation Spry 1 each by Nasal route as needed (hypoglycemia).    insulin aspart U-100 (NOVOLOG) 100 unit/mL (3 mL) InPn pen Inject 18 Units into the skin 3 (three) times daily with meals: MAX 94 units/daily  150-200    201-250    251-300    301-350    >350  +2 units   +4 units    +6 units    +8  "units    +10 units    insulin detemir U-100, Levemir, 100 unit/mL (3 mL) SubQ InPn pen Inject 10 Units into the skin 2 (two) times daily. In the morning and before bed.    lancets 33 gauge Misc Use to test blood glucose 4 (four) times daily.    levETIRAcetam (KEPPRA) 1000 MG tablet Take 1.5 tablets (1,500 mg total) by mouth 2 (two) times daily.    magnesium oxide (MAG-OX) 400 mg (241.3 mg magnesium) tablet Take 1 tablet (400 mg total) by mouth once daily.    ondansetron (ZOFRAN-ODT) 4 MG TbDL Take 1 tablet (4 mg total) by mouth every 8 (eight) hours as needed (nausea).    pantoprazole (PROTONIX) 40 MG tablet Take 1 tablet (40 mg total) by mouth once daily.    pen needle, diabetic 32 gauge x 5/32" Ndle 1 each by Misc.(Non-Drug; Combo Route) route 4 (four) times daily.    polyethylene glycol (GLYCOLAX) 17 gram PwPk Take 17 g by mouth once daily.    potassium chloride SA (K-DUR,KLOR-CON M) 10 MEQ tablet Take 3 tablets (30 mEq total) by mouth once daily.    senna-docusate 8.6-50 mg (PERICOLACE) 8.6-50 mg per tablet Take 1 tablet by mouth daily as needed for Constipation.    torsemide (DEMADEX) 20 MG Tab Take 3 tablets (60 mg total) by mouth 2 (two) times a day.    warfarin (COUMADIN) 3 MG tablet Take 0.5 tablets (1.5 mg total) by mouth Daily.     Antibiotics (From admission, onward)      Start     Stop Route Frequency Ordered    01/16/25 1600  ceFAZolin (Ancef) 6 g in D5W 500 mL CONTINUOUS INFUSION         -- IV Every 24 hours (non-standard times) 01/16/25 1457    01/15/25 0900  mupirocin 2 % ointment         01/20/25 0859 Nasl 2 times daily 01/15/25 0314          Antifungals (From admission, onward)      None          Antivirals (From admission, onward)      None             Immunization History   Administered Date(s) Administered    COVID-19, MRNA, LN-S, PF (Pfizer) (Purple Cap) 12/08/2021, 12/29/2021    DTP 1985, 1985, 1985, 06/01/1987    HIB 02/05/1990    Influenza - Quadrivalent - PF *Preferred* " (6 months and older) 10/16/2020, 09/24/2021, 12/16/2022    MMR 06/01/1987    OPV 1985, 1985, 06/01/1987       Family History       Problem Relation (Age of Onset)    Diverticulosis Brother    Heart attack Maternal Grandmother, Maternal Grandfather    Heart failure Father          Social History     Socioeconomic History    Marital status:    Tobacco Use    Smoking status: Former     Current packs/day: 0.00     Average packs/day: 0.5 packs/day for 16.6 years (8.3 ttl pk-yrs)     Types: Cigarettes     Start date: 10/1/2004     Quit date: 4/23/2021     Years since quitting: 3.7    Smokeless tobacco: Never   Substance and Sexual Activity    Alcohol use: Not Currently    Drug use: Not Currently     Types: Marijuana, MDMA (Ecstacy)    Sexual activity: Yes     Partners: Female     Birth control/protection: None     Social Drivers of Health     Financial Resource Strain: Low Risk  (1/15/2025)    Overall Financial Resource Strain (CARDIA)     Difficulty of Paying Living Expenses: Not very hard   Food Insecurity: No Food Insecurity (1/15/2025)    Hunger Vital Sign     Worried About Running Out of Food in the Last Year: Never true     Ran Out of Food in the Last Year: Never true   Transportation Needs: No Transportation Needs (1/15/2025)    TRANSPORTATION NEEDS     Transportation : No   Physical Activity: Unknown (3/21/2024)    Exercise Vital Sign     Days of Exercise per Week: Patient declined     Minutes of Exercise per Session: 0 min   Stress: No Stress Concern Present (1/15/2025)    [employer] [place] [employer] - Occupational Stress Questionnaire     Feeling of Stress : Not at all   Housing Stability: Low Risk  (1/15/2025)    Housing Stability Vital Sign     Unable to Pay for Housing in the Last Year: No     Homeless in the Last Year: No     Review of Systems   Constitutional:  Negative for fatigue and fever.   Eyes:  Positive for visual disturbance.   Gastrointestinal:  Positive for  abdominal pain and nausea.   Musculoskeletal:  Negative for back pain, myalgias and neck pain.   Skin:  Negative for wound.   Neurological:  Positive for headaches.   All other systems reviewed and are negative.    Objective:     Vital Signs (Most Recent):  Temp: 97.7 °F (36.5 °C) (01/16/25 1517)  Pulse: 95 (01/16/25 1527)  Resp: 18 (01/16/25 1517)  BP: (!) 82/0 (01/16/25 1159)  SpO2: 96 % (01/16/25 1517) Vital Signs (24h Range):  Temp:  [97.7 °F (36.5 °C)-98.5 °F (36.9 °C)] 97.7 °F (36.5 °C)  Pulse:  [72-98] 95  Resp:  [18] 18  SpO2:  [96 %-98 %] 96 %  BP: (72-91)/(0-61) 82/0     Weight: 68.1 kg (150 lb 2.1 oz)  Body mass index is 18.77 kg/m².    Estimated Creatinine Clearance: 79.6 mL/min (based on SCr of 1.2 mg/dL).     Physical Exam  Vitals and nursing note reviewed.   Constitutional:       Appearance: Normal appearance.   HENT:      Head: Normocephalic.      Nose: Nose normal.      Mouth/Throat:      Mouth: Mucous membranes are dry.      Pharynx: No oropharyngeal exudate.   Cardiovascular:      Comments: VAD hum  Abdominal:      General: There is distension.      Palpations: Abdomen is soft.      Tenderness: There is abdominal tenderness (over DLES). There is no right CVA tenderness or left CVA tenderness.   Musculoskeletal:         General: No swelling. Normal range of motion.   Skin:     General: Skin is warm and dry.      Findings: No lesion or rash.   Neurological:      Mental Status: He is alert.   Psychiatric:         Mood and Affect: Mood normal.         Behavior: Behavior normal.          Significant Labs:   Microbiology Results (last 7 days)       Procedure Component Value Units Date/Time    Blood culture [[id number]] Collected: 01/16/25 1006    Order Status: Sent Specimen: Blood Updated: 01/16/25 1026    Blood culture [[id number]] Collected: 01/16/25 1006    Order Status: Sent Specimen: Blood Updated: 01/16/25 1026    MRSA Screen by PCR [[id number]] Collected: 01/15/25 1630    Order Status: Completed  Specimen: Nasal Swab Updated: 01/15/25 1852     MRSA SCREEN BY PCR Not Detected    Culture, Anaerobe [[id number]]     Order Status: Canceled Specimen: Body Fluid from Abdomen     Aerobic culture [[id number]]     Order Status: Canceled Specimen: Incision site             Significant Imaging: I have reviewed all pertinent imaging results/findings within the past 24 hours.

## 2025-01-16 NOTE — CONSULTS
[facility] - Cardiac Intensive Care  Infectious Disease  Consult Note    Patient Name: Kevan Queen  MRN: [id number]  Admission Date: 1/15/2025  Hospital Length of Stay: 1 days  Attending Physician: Josh Ryan, *  Primary Care Provider: Rosio Armendariz FNP     Isolation Status: No active isolations    Patient information was obtained from patient and ER records.      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 Blood cultures with GPCs, PCR with MSSA. Has new abdominal distention and ascites with pain over DLES. CT A/P without abscess, but does have ascites. No signs of phlebitis from recent admission. He affirms he has been taking his suppressive cefadroxil with no missed doses. Has a headache and some blurry vision. No neck pain or joint pain. No indwelling hardware. He denies injection drug use.     Recommendations  - stop vancomycin and pip-tazo  - start cefazolin 2 grams q8 hours  - repeat blood cultures q48 hours until no growth x 48 hours  - would consult ophthalmology for eye exam given new onset blurry vision  - obtain TTE  - would consider paracentesis given new ascites and abdominal pain         Above discussed with primary team.     Time: 75 minutes   50% of time spent on face-to-face counseling and coordination of care. Counseling included review of test results, diagnosis, and treatment plan with patient and/or family.  I have reviewed hospital notes from CCU service and other specialty providers as well as outside medical records. I have also reviewed CBC, CMP/BMP,  cultures and imaging with my interpretation as documented.  Patient is high risk of morbidity, on antibiotics requiring intensive monitoring for toxicity.     Thank you for your consult. I will follow-up with patient. Please contact us if you have any additional questions.    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 He developed nausea and abdominal pain acutely after eating. States he also developed abdominal distention and pain since his recent discharge on 1/9. He states he has been consistently taking his cefadroxil and denies missed doses. He states his DLES is unchanged, has some purulent discharged and pain, but not significantly worse than usual. Has a headache and some blurry vision. No neck pain or joint pain. No indwelling hardware. He denies injection drug use. He denies having painful IV sites during/after his recent admission.     Past Medical History:   Diagnosis Date    Acute respiratory failure with hypoxia 07/22/2023    Arthritis     Awareness alteration, transient 09/01/2022    Cardiomyopathy     CHF (congestive heart failure) 10/01/2020    COVID-19 06/03/2023    Diabetes mellitus     Dilated cardiomyopathy 10/26/2020    Drug abuse 10/2020    Headache 04/19/2023    Hyperglycemia 12/16/2022    Hyperosmolar hyperglycemic state (HHS) 05/25/2022    ICD (implantable cardioverter-defibrillator) in place 10/26/2020    Infected defibrillator now s/p removal Nov 2023 07/23/2023    Left ventricular assist device (LVAD) complication, initial encounter 06/05/2023    Muscle cramping 06/15/2022    Renal disorder     SOB (shortness of breath) 06/13/2022    Tingling in extremities 07/13/2022       Past  Surgical History:   Procedure Laterality Date    APPLICATION OF WOUND VACUUM-ASSISTED CLOSURE DEVICE N/A 6/30/2022    Procedure: APPLICATION, WOUND VAC;  Surgeon: Luis F Paige MD;  [facility];  Service: Cardiovascular;  Laterality: N/A;  50 x 5 cm    CARDIAC DEFIBRILLATOR PLACEMENT      COLONOSCOPY N/A 9/25/2024    Procedure: COLONOSCOPY;  Surgeon: Drake Barton MD;  Location: [facility] ENDO (2ND FLR);  Service: Endoscopy;  Laterality: N/A;    ECHOCARDIOGRAM,TRANSESOPHAGEAL  11/9/2023    Procedure: Transesophageal echo (GUILELRMO) intra-procedure log documentation;  Surgeon: Eron Ivy MD;  Location: [facility] EP LAB;  Service: Cardiology;;    ESOPHAGOGASTRODUODENOSCOPY N/A 9/25/2024    Procedure: EGD (ESOPHAGOGASTRODUODENOSCOPY);  Surgeon: Drake Barton MD;  Location: [facility] ENDO (2ND FLR);  Service: Endoscopy;  Laterality: N/A;    EXTRACTION, ELECTRODE LEAD Left 11/9/2023    Procedure: EXTRACTION, ELECTRODE LEAD;  Surgeon: ADALID Leon MD;  Location: [facility] EP LAB;  Service: Cardiology;  Laterality: Left;  INFECTION, LEAD EXTRACTION, GUILLERMO, BSCI, ANES, EH,   *NO CTS BACKUP*    IMPLANTATION OF RIGHT VENTRICULAR ASSIST DEVICE (RVAD) N/A 6/29/2022    Procedure: INSERTION, RVAD;  Surgeon: Luis F Paige MD;  [facility];  Service: Cardiovascular;  Laterality: N/A;    INSERTION OF GRAFT TO PERICARDIUM Right 6/30/2022    Procedure: INSERTION-RIGHT VENTRICULAR ASSIST DEVICE;  Surgeon: Luis F Paige MD;  [address]  Service: Cardiovascular;  Laterality: Right;    IRRIGATION OF MEDIASTINUM  6/30/2022    Procedure: IRRIGATION, MEDIASTINUM;  Surgeon: Luis F Paige MD;  [facility];  Service: Cardiovascular;;    LEFT VENTRICULAR ASSIST DEVICE Left 6/23/2022    Procedure: INSERTION-LEFT VENTRICULAR ASSIST DEVICE;  Surgeon: Luis F Paige MD;  [facility];  Service: Cardiovascular;  Laterality: Left;    LEFT VENTRICULAR ASSIST DEVICE N/A  6/29/2022    Procedure: INSERTION-LEFT VENTRICULAR ASSIST DEVICE;  Surgeon: Luis F Paige MD;  Location: [facility] OR 2ND FLR;  Service: Cardiovascular;  Laterality: N/A;    REVISION OF SKIN POCKET FOR CARDIOVERTER-DEFIBRILLATOR  11/9/2023    Procedure: REVISION, SKIN POCKET, FOR CARDIOVERTER-DEFIBRILLATOR;  Surgeon: ADALID Leon MD;  Location: [facility] EP LAB;  Service: Cardiology;;    RIGHT HEART CATHETERIZATION Right 4/8/2022    Procedure: INSERTION, CATHETER, RIGHT HEART;  Surgeon: Luca Lopez Jr., MD;  Location: [facility] CATH LAB;  Service: Cardiology;  Laterality: Right;    RIGHT HEART CATHETERIZATION Right 4/19/2022    Procedure: INSERTION, CATHETER, RIGHT HEART;  Surgeon: Josh Pulido MD;  Location: [facility] CATH LAB;  Service: Cardiology;  Laterality: Right;    RIGHT HEART CATHETERIZATION Right 7/21/2022    Procedure: INSERTION, CATHETER, RIGHT HEART;  Surgeon: Dalia Crum MD;  Location: [facility] CATH LAB;  Service: Cardiology;  Laterality: Right;    RIGHT HEART CATHETERIZATION Right 10/31/2022    Procedure: INSERTION, CATHETER, RIGHT HEART;  Surgeon: Dalia Crum MD;  Location: [facility] CATH LAB;  Service: Cardiology;  Laterality: Right;    STERNAL WOUND CLOSURE N/A 6/30/2022    Procedure: CLOSURE, WOUND, STERNUM;  Surgeon: Luis F Paige MD;  [facility];  Service: Cardiovascular;  Laterality: N/A;       Review of patient's allergies indicates:   Allergen Reactions    Bumex [bumetanide] Hives       Medications:  Medications Prior to Admission   Medication Sig    acetaminophen (TYLENOL) 325 MG tablet Take 2 tablets (650 mg total) by mouth every 6 (six) hours as needed for Pain.    amiodarone (PACERONE) 200 MG Tab Take 1 tablet (200 mg total) by mouth once daily.    amLODIPine (NORVASC) 10 MG tablet Take 1 tablet (10 mg total) by mouth once daily.    aspirin (ECOTRIN) 81 MG EC tablet Take 1 tablet (81 mg total) by mouth once daily.    atorvastatin (LIPITOR) 40 MG tablet Take 1 tablet  "(40 mg total) by mouth once daily.    blood sugar diagnostic Strp Use to test blood glucose 4 (four) times daily.    blood-glucose meter (TRUE METRIX GLUCOSE METER) Misc Use to test blood glucose 4 (four) times daily.    blood-glucose sensor (FREESTYLE LUCIUS 3 SENSOR) Dee 1 Device by Misc.(Non-Drug; Combo Route) route every 14 (fourteen) days.    cefadroxil (DURICEF) 500 MG Cap Take 1 capsule (500 mg total) by mouth every 12 (twelve) hours.    DULoxetine (CYMBALTA) 30 MG capsule Take 1 capsule (30 mg total) by mouth once daily.    empagliflozin (JARDIANCE) 10 mg tablet Take 1 tablet (10 mg total) by mouth once daily.    eplerenone (INSPRA) 25 MG Tab Take 2 tablets (50 mg total) by mouth once daily.    gabapentin (NEURONTIN) 100 MG capsule Take 1 capsule (100 mg total) by mouth 2 (two) times daily AND 4 capsules (400 mg total) every evening.    glucagon (BAQSIMI) 3 mg/actuation Spry 1 each by Nasal route as needed (hypoglycemia).    insulin aspart U-100 (NOVOLOG) 100 unit/mL (3 mL) InPn pen Inject 18 Units into the skin 3 (three) times daily with meals: MAX 94 units/daily  150-200    201-250    251-300    301-350    >350  +2 units   +4 units    +6 units    +8 units    +10 units    insulin detemir U-100, Levemir, 100 unit/mL (3 mL) SubQ InPn pen Inject 10 Units into the skin 2 (two) times daily. In the morning and before bed.    lancets 33 gauge Misc Use to test blood glucose 4 (four) times daily.    levETIRAcetam (KEPPRA) 1000 MG tablet Take 1.5 tablets (1,500 mg total) by mouth 2 (two) times daily.    magnesium oxide (MAG-OX) 400 mg (241.3 mg magnesium) tablet Take 1 tablet (400 mg total) by mouth once daily.    ondansetron (ZOFRAN-ODT) 4 MG TbDL Take 1 tablet (4 mg total) by mouth every 8 (eight) hours as needed (nausea).    pantoprazole (PROTONIX) 40 MG tablet Take 1 tablet (40 mg total) by mouth once daily.    pen needle, diabetic 32 gauge x 5/32" Ndle 1 each by Misc.(Non-Drug; Combo Route) route 4 (four) times " daily.    polyethylene glycol (GLYCOLAX) 17 gram PwPk Take 17 g by mouth once daily.    potassium chloride SA (K-DUR,KLOR-CON M) 10 MEQ tablet Take 3 tablets (30 mEq total) by mouth once daily.    senna-docusate 8.6-50 mg (PERICOLACE) 8.6-50 mg per tablet Take 1 tablet by mouth daily as needed for Constipation.    torsemide (DEMADEX) 20 MG Tab Take 3 tablets (60 mg total) by mouth 2 (two) times a day.    warfarin (COUMADIN) 3 MG tablet Take 0.5 tablets (1.5 mg total) by mouth Daily.     Antibiotics (From admission, onward)      Start     Stop Route Frequency Ordered    01/16/25 1600  ceFAZolin (Ancef) 6 g in D5W 500 mL CONTINUOUS INFUSION         -- IV Every 24 hours (non-standard times) 01/16/25 1457    01/15/25 0900  mupirocin 2 % ointment         01/20/25 0859 Nasl 2 times daily 01/15/25 0314          Antifungals (From admission, onward)      None          Antivirals (From admission, onward)      None             Immunization History   Administered Date(s) Administered    COVID-19, MRNA, LN-S, PF (Pfizer) (Purple Cap) 12/08/2021, 12/29/2021    DTP 1985, 1985, 1985, 06/01/1987    HIB 02/05/1990    Influenza - Quadrivalent - PF *Preferred* (6 months and older) 10/16/2020, 09/24/2021, 12/16/2022    MMR 06/01/1987    OPV 1985, 1985, 06/01/1987       Family History       Problem Relation (Age of Onset)    Diverticulosis Brother    Heart attack Maternal Grandmother, Maternal Grandfather    Heart failure Father          Social History     Socioeconomic History    Marital status:    Tobacco Use    Smoking status: Former     Current packs/day: 0.00     Average packs/day: 0.5 packs/day for 16.6 years (8.3 ttl pk-yrs)     Types: Cigarettes     Start date: 10/1/2004     Quit date: 4/23/2021     Years since quitting: 3.7    Smokeless tobacco: Never   Substance and Sexual Activity    Alcohol use: Not Currently    Drug use: Not Currently     Types: Marijuana, MDMA (Ecstacy)    Sexual  activity: Yes     Partners: Female     Birth control/protection: None     Social Drivers of Health     Financial Resource Strain: Low Risk  (1/15/2025)    Overall Financial Resource Strain (CARDIA)     Difficulty of Paying Living Expenses: Not very hard   Food Insecurity: No Food Insecurity (1/15/2025)    Hunger Vital Sign     Worried About Running Out of Food in the Last Year: Never true     Ran Out of Food in the Last Year: Never true   Transportation Needs: No Transportation Needs (1/15/2025)    TRANSPORTATION NEEDS     Transportation : No   Physical Activity: Unknown (3/21/2024)    Exercise Vital Sign     Days of Exercise per Week: Patient declined     Minutes of Exercise per Session: 0 min   Stress: No Stress Concern Present (1/15/2025)    [employer] - Occupational Stress Questionnaire     Feeling of Stress : Not at all   Housing Stability: Low Risk  (1/15/2025)    Housing Stability Vital Sign     Unable to Pay for Housing in the Last Year: No     Homeless in the Last Year: No     Review of Systems   Constitutional:  Negative for fatigue and fever.   Eyes:  Positive for visual disturbance.   Gastrointestinal:  Positive for abdominal pain and nausea.   Musculoskeletal:  Negative for back pain, myalgias and neck pain.   Skin:  Negative for wound.   Neurological:  Positive for headaches.   All other systems reviewed and are negative.    Objective:     Vital Signs (Most Recent):  Temp: 97.7 °F (36.5 °C) (01/16/25 1517)  Pulse: 95 (01/16/25 1527)  Resp: 18 (01/16/25 1517)  BP: (!) 82/0 (01/16/25 1159)  SpO2: 96 % (01/16/25 1517) Vital Signs (24h Range):  Temp:  [97.7 °F (36.5 °C)-98.5 °F (36.9 °C)] 97.7 °F (36.5 °C)  Pulse:  [72-98] 95  Resp:  [18] 18  SpO2:  [96 %-98 %] 96 %  BP: (72-91)/(0-61) 82/0     Weight: 68.1 kg (150 lb 2.1 oz)  Body mass index is 18.77 kg/m².    Estimated Creatinine Clearance: 79.6 mL/min (based on SCr of 1.2 mg/dL).     Physical Exam  Vitals and nursing note  reviewed.   Constitutional:       Appearance: Normal appearance.   HENT:      Head: Normocephalic.      Nose: Nose normal.      Mouth/Throat:      Mouth: Mucous membranes are dry.      Pharynx: No oropharyngeal exudate.   Cardiovascular:      Comments: VAD hum  Abdominal:      General: There is distension.      Palpations: Abdomen is soft.      Tenderness: There is abdominal tenderness (over DLES). There is no right CVA tenderness or left CVA tenderness.   Musculoskeletal:         General: No swelling. Normal range of motion.   Skin:     General: Skin is warm and dry.      Findings: No lesion or rash.   Neurological:      Mental Status: He is alert.   Psychiatric:         Mood and Affect: Mood normal.         Behavior: Behavior normal.          Significant Labs:   Microbiology Results (last 7 days)       Procedure Component Value Units Date/Time    Blood culture [[id number]] Collected: 01/16/25 1006    Order Status: Sent Specimen: Blood Updated: 01/16/25 1026    Blood culture [[id number]] Collected: 01/16/25 1006    Order Status: Sent Specimen: Blood Updated: 01/16/25 1026    MRSA Screen by PCR [[id number]] Collected: 01/15/25 1630    Order Status: Completed Specimen: Nasal Swab Updated: 01/15/25 1852     MRSA SCREEN BY PCR Not Detected    Culture, Anaerobe [[id number]]     Order Status: Canceled Specimen: Body Fluid from Abdomen     Aerobic culture [[id number]]     Order Status: Canceled Specimen: Incision site             Significant Imaging: I have reviewed all pertinent imaging results/findings within the past 24 hours.

## 2025-01-16 NOTE — ASSESSMENT & PLAN NOTE
-Volume up on exam. Diuresing well.  -Continue Lasix 40mg/hr. Will give dose of PO metolazone today.

## 2025-01-16 NOTE — ASSESSMENT & PLAN NOTE
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 Therapeutic today.  Previous home regimen 1.5mg Qdaily  -Antiplatelets ASA 81 mg, on hold with GIB  -LDH is stable overall today. Will continue to monitor daily.  -Speed set at 5100  -Interrogation notable for no events  -Not listed for OHTx    Procedure: Device Interrogation Including analysis of device parameters  Current Settings: Ventricular Assist Device        1/16/2025     3:56 AM 1/15/2025    11:58 PM 1/15/2025     8:32 PM 1/15/2025     4:18 PM 1/15/2025     3:26 PM 1/15/2025    12:22 PM 1/15/2025     8:45 AM   TXP LVAD INTERROGATIONS   Type HeartMate3 HeartMate3 HeartMate3 HeartMate3 HeartMate3 HeartMate3 HeartMate3   Flow 3.9 4.1 4.4 4.1 4.5 4.5 4.3   Speed 5100 5100 5100 5100 5100 5100 5150   PI 5.9 5.3 3.9 4.7 3.9 3.7 3.8   Power (Serna) 3.6 3.6 3.7 3.6 3.6 3.5 3.5   LSL 4700 4700 4700 4700 4700 4700 4700   Pulsatility No Pulse No Pulse No Pulse No Pulse No Pulse No Pulse No Pulse

## 2025-01-16 NOTE — PROGRESS NOTES
[facility] - Cardiac Intensive [facility]  Heart Transplant  Progress Note    Patient Name: Kevan Queen  MRN: [id number]  Admission Date: 1/15/2025  Hospital Length of Stay: 1 days  Attending Physician: Josh Ryan, *  Primary Care Provider: Rosio Armendariz FNP  Principal Problem:Acute on chronic systolic CHF (congestive heart failure)    Subjective:   Interval History: NAOEN. Blood cultures growing staph. Still quite volume overloaded on exam. Will plan to augment diuresis with PO metolazone this am. Replace electrolytes as needed.     Continuous Infusions:   furosemide (Lasix) 500 mg in 50 mL infusion (conc: 10 mg/mL)  40 mg/hr Intravenous Continuous 4 mL/hr at 01/15/25 2219 40 mg/hr at 01/15/25 2219     Scheduled Meds:   amiodarone  200 mg Oral Daily    amLODIPine  10 mg Oral Daily    aspirin  81 mg Oral Daily    atorvastatin  40 mg Oral Daily    DULoxetine  30 mg Oral Daily    empagliflozin  10 mg Oral Daily    eplerenone  50 mg Oral Daily    gabapentin  100 mg Oral BID    And    gabapentin  400 mg Oral QHS    insulin aspart U-100  10 Units Subcutaneous TID WM    insulin glargine U-100  10 Units Subcutaneous Daily    levETIRAcetam  1,500 mg Oral BID    magnesium oxide  400 mg Oral BID    metOLazone  10 mg Oral Once    mupirocin   Nasal BID    pantoprazole  40 mg Oral Daily    piperacillin-tazobactam (Zosyn) IV (PEDS and ADULTS) (extended infusion is not appropriate)  4.5 g Intravenous Q8H    potassium chloride  20 mEq Oral BID    warfarin  1.5 mg Oral Daily     PRN Meds:  Current Facility-Administered Medications:     acetaminophen, 650 mg, Oral, Q6H PRN    dextrose 50%, 12.5 g, Intravenous, PRN    dextrose 50%, 25 g, Intravenous, PRN    glucagon (human recombinant), 1 mg, Intramuscular, PRN    glucose, 16 g, Oral, PRN    glucose, 24 g, Oral, PRN    insulin aspart U-100, 10 Units, Subcutaneous, TID WM **AND** insulin aspart U-100, 0-10 Units, Subcutaneous, PRN    ondansetron, 4 mg, Oral, Q8H PRN     senna-docusate 8.6-50 mg, 1 tablet, Oral, Daily PRN    Pharmacy to dose Vancomycin consult, , , Once **AND** vancomycin - pharmacy to dose, , Intravenous, pharmacy to manage frequency    Review of patient's allergies indicates:   Allergen Reactions    Bumex [bumetanide] Hives     Objective:     Vital Signs (Most Recent):  Temp: 98.4 °F (36.9 °C) (01/16/25 0329)  Pulse: 88 (01/16/25 0400)  Resp: 18 (01/16/25 0329)  BP: (!) 76/0 (01/16/25 0355)  SpO2: 97 % (01/16/25 0329) Vital Signs (24h Range):  Temp:  [98 °F (36.7 °C)-99 °F (37.2 °C)] 98.4 °F (36.9 °C)  Pulse:  [72-96] 88  Resp:  [18] 18  SpO2:  [96 %-98 %] 97 %  BP: (72-91)/(0-61) 76/0     Patient Vitals for the past 72 hrs (Last 3 readings):   Weight   01/16/25 0345 71.5 kg (157 lb 10.1 oz)   01/15/25 0300 74 kg (163 lb 2.3 oz)     Body mass index is 19.7 kg/m².      Intake/Output Summary (Last 24 hours) at 1/16/2025 0815  Last data filed at 1/16/2025 0600  Gross per 24 hour   Intake 2100 ml   Output 6950 ml   Net -4850 ml       Telemetry: NSR 90s       Physical Exam  Vitals and nursing note reviewed.   Constitutional:       Appearance: He is well-developed.   HENT:      Head: Normocephalic.   Eyes:      Pupils: Pupils are equal, round, and reactive to light.   Cardiovascular:      Rate and Rhythm: Normal rate and regular rhythm.      Heart sounds: Murmur heard.      Comments: VAD hum  Pulmonary:      Effort: Pulmonary effort is normal.      Breath sounds: Normal breath sounds.   Abdominal:      General: Bowel sounds are normal. There is distension.      Palpations: Abdomen is soft.   Musculoskeletal:         General: Normal range of motion.      Cervical back: Normal range of motion and neck supple.      Right lower leg: Edema present.      Left lower leg: Edema present.   Skin:     General: Skin is warm and dry.   Neurological:      Mental Status: He is alert and oriented to person, place, and time.   Psychiatric:         Behavior: Behavior normal.             Significant Labs:  CBC:  Recent Labs   Lab 01/14/25  0250 01/15/25  0740 01/16/25  0421   WBC 27.21  27.21* 15.46* 10.98   RBC 4.85 4.29* 4.58*   HGB 8.8* 7.9* 8.3*   HCT 31.0* 26.1* 28.4*    217 228   MCV 63.9* 61* 62*   MCH 18.1* 18.4* 18.1*   MCHC 28.4* 30.3* 29.2*     BNP:  Recent Labs   Lab 01/14/25  0250 01/15/25  0436   BNP 1,009.6* 1,157*     CMP:  Recent Labs   Lab 01/14/25  0250 01/15/25  0436 01/15/25  0740 01/16/25  0421   GLU  --   --  182* 101   CALCIUM 8.7  --  8.3* 8.6*   ALBUMIN 2.6* 2.4* 2.2*  --    PROT  --  8.6* 7.9  --      --  132* 136   K 3.5  --  3.4* 3.2*   CO2 22  --  25 27     --  100 97   BUN 17.7  --  19 17   CREATININE 1.61*  --  1.3 1.2   ALKPHOS 191* 150 141  --    ALT 12 14 14  --    AST 32 35 31  --    BILITOT 2.2* 2.4* 2.3*  --       Coagulation:   Recent Labs   Lab 01/14/25  2007 01/15/25  0740 01/16/25  0421   INR 2.5* 2.1*  2.1* 2.0*   APTT  --   --  33.0*     LDH:  Recent Labs   Lab 01/16/25  0421        Microbiology:  Microbiology Results (last 7 days)       Procedure Component Value Units Date/Time    Blood culture [[id number]]     Order Status: Sent Specimen: Blood     Blood culture [[id number]]     Order Status: Sent Specimen: Blood     MRSA Screen by PCR [[id number]] Collected: 01/15/25 1630    Order Status: Completed Specimen: Nasal Swab Updated: 01/15/25 1852     MRSA SCREEN BY PCR Not Detected    Culture, Anaerobe [[id number]]     Order Status: Canceled Specimen: Body Fluid from Abdomen     Aerobic culture [[id number]]     Order Status: Canceled Specimen: Incision site             I have reviewed all pertinent labs within the past 24 hours.    Estimated Creatinine Clearance: 83.6 mL/min (based on SCr of 1.2 mg/dL).    Diagnostic Results:  I have reviewed all pertinent imaging results/findings within the past 24 hours.  Assessment and Plan:     No notes on file    * Acute on chronic systolic CHF (congestive heart failure)  -Volume up on  exam. Diuresing well.  -Continue Lasix 40mg/hr. Will give dose of PO metolazone today.     Bacteremia  -Blood Cx 1/16 growing staph.   -Repeat Blood Cx today.  -Cont Vanc/Zosyn.  -ID consult.     LVAD (left ventricular assist device) present  HeartMate 3 Implanted 6/23/2022 with early RV failure requiring RVAD with ProTek Duo   -Continue Coumadin,  Goal INR 2.0-3.0 . Therapeutic today.  Previous home regimen 1.5mg Qdaily  -Antiplatelets ASA 81 mg, on hold with GIB  -LDH is stable overall today. Will continue to monitor daily.  -Speed set at 5100  -Interrogation notable for no events  -Not listed for OHTx    Procedure: Device Interrogation Including analysis of device parameters  Current Settings: Ventricular Assist Device        1/16/2025     3:56 AM 1/15/2025    11:58 PM 1/15/2025     8:32 PM 1/15/2025     4:18 PM 1/15/2025     3:26 PM 1/15/2025    12:22 PM 1/15/2025     8:45 AM   TXP LVAD INTERROGATIONS   Type HeartMate3 HeartMate3 HeartMate3 HeartMate3 HeartMate3 HeartMate3 HeartMate3   Flow 3.9 4.1 4.4 4.1 4.5 4.5 4.3   Speed 5100 5100 5100 5100 5100 5100 5150   PI 5.9 5.3 3.9 4.7 3.9 3.7 3.8   Power (Serna) 3.6 3.6 3.7 3.6 3.6 3.5 3.5   LSL 4700 4700 4700 4700 4700 4700 4700   Pulsatility No Pulse No Pulse No Pulse No Pulse No Pulse No Pulse No Pulse             Fausto Reyes, NP  Heart Transplant  [facility] - [facility]

## 2025-01-16 NOTE — PLAN OF CARE
Pt free of falls and injury. Pt with headache at beginning of shift, no more complaints if pain throughout shift. Pt AAOx4. Fall precautions remain in place. LVAD hum present and smooth. VAD numbers and dopplers WDL. DLES dressing CDI. Plan of care reviewed with pt. Call light and belongings within reach.    Problem: Adult Inpatient Plan of Care  Goal: Plan of Care Review  Outcome: Progressing  Goal: Patient-Specific Goal (Individualized)  Outcome: Progressing     Problem: Diabetes Comorbidity  Goal: Blood Glucose Level Within Targeted Range  Outcome: Progressing     Problem: Sepsis/Septic Shock  Goal: Optimal Coping  Outcome: Progressing  Goal: Absence of Bleeding  Outcome: Progressing     Problem: Ventricular Assist Device  Goal: Optimal Adjustment to Device  Outcome: Progressing  Goal: Absence of Bleeding  Outcome: Progressing     Problem: Fall Injury Risk  Goal: Absence of Fall and Fall-Related Injury  Outcome: Progressing

## 2025-01-16 NOTE — NURSING
Notified Fitssimons.NP K 3.2, Mag 1.9, 60 mEq K tablet given at 0723, and another 20 mEq K scheduled at 0900, 400 mg Mag tablet given.

## 2025-01-16 NOTE — ASSESSMENT & PLAN NOTE
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 Blood cultures with GPCs, PCR with MSSA. Has new abdominal distention and ascites with pain over DLES. CT A/P without abscess, but does have ascites. No signs of phlebitis from recent admission. He affirms he has been taking his suppressive cefadroxil with no missed doses. Has a headache and some blurry vision. No neck pain or joint pain. No indwelling hardware. He denies injection drug use.     Recommendations  - stop vancomycin and pip-tazo  - start cefazolin 2 grams q8 hours  - repeat blood cultures q48 hours until no growth x 48 hours  - would consult ophthalmology for eye exam given new onset blurry vision  - obtain TTE  - would consider paracentesis given new ascites and abdominal pain

## 2025-01-16 NOTE — PROGRESS NOTES
"[facility] - Cardiac Intensive Care  Adult Nutrition  Progress Note    SUMMARY       Recommendations    Continue cardiac diet as tolerated.  RD to monitor and follow up.    Goals: Meet % EEN/EPN by RD follow up.  Nutrition Goal Status: new  Communication of RD Recs: other (comment) (POC)    Assessment and Plan    Nutrition Problem  Increased nutrient needs (protein)    Related to (etiology):   Increased physiological needs    Signs and Symptoms (as evidenced by):   LVAD present      Interventions/Recommendations (treatment strategy):  Collab of care w other providers     Nutrition Diagnosis Status:   New      Malnutrition Assessment  NFPE pending.     Reason for Assessment    Reason For Assessment: identified at risk by screening criteria (MST 3)  Diagnosis:  (Acute on chronic systolic CHF (congestive heart failure))  General Information Comments: Pt identified at risk by screening criteria - MST 3. PMHx of stage D CHF due to NICM, polysubstance abuse, tobacco use, DM, underwent DT-HM3 implantation 6/23/2022 with early RV failure requiring RVAD with ProTek Duo after admitted with ADHF/cardiogenic shock on home milrinone requiring IABP. Pt reports adequate appetite/intake. No N/V/C/D. Pt says he has not had any recent weight loss, reports UBW of 190 lbs. Pt with 40 lb (21% BW) lost in 1 year. Pt appeared to have potential orbital and temple wasting. RD wanted to verify wasting, pt did not consent to NFPE at this time. Will re-attempt at f/u.  Nutrition Discharge Planning: Cardiac-diabetic diet    Nutrition Related Social Determinants of Health: SDOH: Adequate food in home environment      Nutrition/Diet History    Nutrition Intake History: Adequate appetite/intake  Food Preferences: None  Spiritual, Cultural Beliefs, Judaism Practices, Values that Affect Care: no  Food Allergies: NKFA  Factors Affecting Nutritional Intake: None identified at this time    Anthropometrics    Height: 6' 3" (190.5 cm)  Height " (inches): 75 in  Weight: 68.1 kg (150 lb 2.1 oz)  Weight (lb): 150.13 lb  Weight Method: Standard Scale  Ideal Body Weight (IBW), Male: 196 lb  % Ideal Body Weight, Male (lb): 83.23 %  BMI (Calculated): 18.8  BMI Grade: 18.5-24.9 - normal  Usual Body Weight (UBW), k.36 kg  % Usual Body Weight: 79.02       Lab/Procedures/Meds    Pertinent Labs Reviewed: reviewed  Pertinent Labs Comments: H/H 8.3/28.4, potassium 3.2, calcium 8.6, albumin 2.2, prealbumin 10, .3  Pertinent Medications Reviewed: reviewed  Pertinent Medications Comments: Amiodarone, aspirin, atorvastatin, jardiance, eplerenone, insulin, pantoprazole, potassium chloride, warfarin, furosemide    Estimated/Assessed Needs    Weight Used For Calorie Calculations: 68.1 kg (150 lb 2.1 oz)  Energy Calorie Requirements (kcal):   Energy Need Method: Lawrence-St Jeor (x 1.25 PAL)  Protein Requirements: 68-82 (1-1.2 g/kg ABW)  Weight Used For Protein Calculations: 68.1 kg (150 lb 2.1 oz)     Estimated Fluid Requirement Method:  (Per MD)  RDA Method (mL):   CHO Requirement: 236-262 g (45-50% EEN)      Nutrition Prescription Ordered    Current Diet Order: Cardiac diet    Evaluation of Received Nutrient/Fluid Intake    I/O: - 6,040 net I/O  Comments: LBM 1/15  % Intake of Estimated Energy Needs: 75 - 100 %  % Meal Intake: 75 - 100 %    Nutrition Risk    Level of Risk/Frequency of Follow-up:  (1x/week)     Monitor and Evaluation    Food and Nutrient Intake: food and beverage intake, energy intake  Food and Nutrient Adminstration: diet order  Anthropometric Measurements: body mass index, weight change  Biochemical Data, Medical Tests and Procedures: lipid profile, inflammatory profile, glucose/endocrine profile, gastrointestinal profile, electrolyte and renal panel  Nutrition-Focused Physical Findings: overall appearance     Nutrition Follow-Up    RD Follow-up?: Yes    Lilian Valencia, Registration Eligible, Provisional LDN

## 2025-01-16 NOTE — PROGRESS NOTES
Pharmacokinetic Assessment Follow Up: IV Vancomycin    Vancomycin serum concentration assessment(s):    The trough level was drawn correctly and can be used to guide therapy at this time. The measurement is above the desired definitive target range of 15 to 20 mcg/mL.    Vancomycin Regimen Plan:    Discontinue the scheduled vancomycin regimen and re-dose when the random level is less than 20 mcg/mL, next level to be drawn at 12:00 on 01/16/2025.    Drug levels (last 3 results):  Recent Labs   Lab Result Units 01/15/25  2052   Vancomycin-Trough ug/mL 21.8       Pharmacy will continue to follow and monitor vancomycin.    Please contact pharmacy at extension [phone] for questions regarding this assessment.    Thank you for the consult,   Griffin Story       Patient brief summary:  Kevan Queen is a 39 y.o. male initiated on antimicrobial therapy with IV Vancomycin for treatment of sepsis    The patient's current regimen is NA    Drug Allergies:   Review of patient's allergies indicates:   Allergen Reactions    Bumex [bumetanide] Hives       Actual Body Weight:   79.9 kg    Renal Function:   Estimated Creatinine Clearance: 79.9 mL/min (based on SCr of 1.3 mg/dL).,     Dialysis Method (if applicable):  N/A    CBC (last 72 hours):  Recent Labs   Lab Result Units 01/14/25  0250 01/15/25  0740   WBC K/uL 27.21  27.21* 15.46*   Hemoglobin g/dL  --  7.9*   Hgb g/dL 8.8*  --    Hematocrit %  --  26.1*   Hct % 31.0*  --    Platelets K/uL  --  217   Platelet x10(3)/mcL 262  --    Gran % %  --  88.4*   Lymph % %  --  5.2*   Mono % %  --  5.3   Monocytes % % 3  --    Eosinophil % %  --  0.3   Basophil % %  --  0.2   Differential Method   --  Automated       Metabolic Panel (last 72 hours):  Recent Labs   Lab Result Units 01/14/25  0250 01/14/25  0717 01/14/25  1003 01/15/25  0436 01/15/25  0740   Sodium mmol/L 137  --   --   --  132*   Sodium, Blood Gas mmol/L  --   --  129*  --   --    Potassium mmol/L 3.5  --   --   --   3.4*   Potassium, Blood Gas mmol/L  --   --  3.4*  --   --    Chloride mmol/L 102  --   --   --  100   CO2 mmol/L 22  --   --   --  25   Glucose mg/dL 167*  --   --   --  182*   Glucose, UA   --  4+*  --   --   --    BUN mg/dL  --   --   --   --  19   Blood Urea Nitrogen mg/dL 17.7  --   --   --   --    Creatinine mg/dL 1.61*  --   --   --  1.3   Albumin g/dL 2.6*  --   --  2.4* 2.2*   Total Bilirubin mg/dL  --   --   --  2.4* 2.3*   Bilirubin Total mg/dL 2.2*  --   --   --   --    ALP unit/L 191*  --   --   --   --    Alkaline Phosphatase U/L  --   --   --  150 141   AST U/L 32  --   --  35 31   ALT U/L 12  --   --  14 14   Magnesium mg/dL  --   --   --   --  1.7       Vancomycin Administrations:  vancomycin given in the last 96 hours                     vancomycin (VANCOCIN) 1,000 mg in 0.9% NaCl 250 mL IVPB (admixture device) (mg) 1,000 mg New Bag 01/15/25 2101     1,000 mg New Bag  0901    vancomycin (VANCOCIN) 1,000 mg in D5W 250 mL IVPB (admixture device) (mg) 1,000 mg New Bag 01/14/25 2112    vancomycin 750 mg in D5W 250 mL IVPB (admixture device) (mg) 750 mg New Bag 01/14/25 0723    vancomycin (VANCOCIN) 1,000 mg in D5W 250 mL IVPB (admixture device) (mg) 1,000 mg New Bag 01/14/25 0549                    Microbiologic Results:  Microbiology Results (last 7 days)       Procedure Component Value Units Date/Time    MRSA Screen by PCR [[id number]] Collected: 01/15/25 1630    Order Status: Completed Specimen: Nasal Swab Updated: 01/15/25 1852     MRSA SCREEN BY PCR Not Detected    Culture, Anaerobe [[id number]]     Order Status: Canceled Specimen: Body Fluid from Abdomen     Aerobic culture [[id number]]     Order Status: Canceled Specimen: Incision site

## 2025-01-16 NOTE — PROGRESS NOTES
Pharmacokinetic Assessment Follow Up: IV Vancomycin    Vancomycin serum concentration assessment(s):    Vancomycin trough resulted at 21.8, slightly above goal (15-20).   Plan to decrease dose to 750mg IV Q 12 hours.   SCr decreased to 1.3mg/dl.   Blood cultures demonstrating growth of staph.MRSA pcr negative, ID consulted.  Follow up trough ordered for 1/17 @2230.   Will continue to follow.     Yany Perkins PharmD, [facility]  Heart Transplant Clinical Specialist   Spectralink: [phone]    Drug levels (last 3 results):  Recent Labs   Lab Result Units 01/15/25  2052   Vancomycin-Trough ug/mL 21.8        Patient brief summary:  Kevan Queen is a 39 y.o. male initiated on antimicrobial therapy with IV Vancomycin for treatment of bacteremia    Drug Allergies:   Review of patient's allergies indicates:   Allergen Reactions    Bumex [bumetanide] Hives       Actual Body Weight:   68.1kg    Renal Function:   Estimated Creatinine Clearance: 79.6 mL/min (based on SCr of 1.2 mg/dL).,     Dialysis Method (if applicable):  N/A    CBC (last 72 hours):  Recent Labs   Lab Result Units 01/14/25  0250 01/15/25  0740 01/16/25  0421   WBC K/uL 27.21  27.21* 15.46* 10.98   Hemoglobin g/dL  --  7.9* 8.3*   Hgb g/dL 8.8*  --   --    Hematocrit %  --  26.1* 28.4*   Hct % 31.0*  --   --    Platelets K/uL  --  217 228   Platelet x10(3)/mcL 262  --   --    Gran % %  --  88.4* 80.3*   Lymph % %  --  5.2* 9.4*   Mono % %  --  5.3 9.1   Monocytes % % 3  --   --    Eosinophil % %  --  0.3 0.5   Basophil % %  --  0.2 0.3   Differential Method   --  Automated Automated       Metabolic Panel (last 72 hours):  Recent Labs   Lab Result Units 01/14/25  0250 01/14/25  0717 01/14/25  1003 01/15/25  0436 01/15/25  0740 01/16/25  0421   Sodium mmol/L 137  --   --   --  132* 136   Sodium, Blood Gas mmol/L  --   --  129*  --   --   --    Potassium mmol/L 3.5  --   --   --  3.4* 3.2*   Potassium, Blood Gas mmol/L  --   --  3.4*  --   --   --    Chloride  mmol/L 102  --   --   --  100 97   CO2 mmol/L 22  --   --   --  25 27   Glucose mg/dL 167*  --   --   --  182* 101   Glucose, UA   --  4+*  --   --   --   --    BUN mg/dL  --   --   --   --  19 17   Blood Urea Nitrogen mg/dL 17.7  --   --   --   --   --    Creatinine mg/dL 1.61*  --   --   --  1.3 1.2   Albumin g/dL 2.6*  --   --  2.4* 2.2*  --    Total Bilirubin mg/dL  --   --   --  2.4* 2.3*  --    Bilirubin Total mg/dL 2.2*  --   --   --   --   --    ALP unit/L 191*  --   --   --   --   --    Alkaline Phosphatase U/L  --   --   --  150 141  --    AST U/L 32  --   --  35 31  --    ALT U/L 12  --   --  14 14  --    Magnesium mg/dL  --   --   --   --  1.7 1.9   Phosphorus mg/dL  --   --   --   --   --  3.7       Vancomycin Administrations:  vancomycin given in the last 96 hours                     vancomycin (VANCOCIN) 1,000 mg in 0.9% NaCl 250 mL IVPB (admixture device) (mg) 1,000 mg New Bag 01/15/25 2101     1,000 mg New Bag  0901    vancomycin (VANCOCIN) 1,000 mg in D5W 250 mL IVPB (admixture device) (mg) 1,000 mg New Bag 01/14/25 2112    vancomycin 750 mg in D5W 250 mL IVPB (admixture device) (mg) 750 mg New Bag 01/14/25 0723    vancomycin (VANCOCIN) 1,000 mg in D5W 250 mL IVPB (admixture device) (mg) 1,000 mg New Bag 01/14/25 0549                    Microbiologic Results:  Microbiology Results (last 7 days)       Procedure Component Value Units Date/Time    Blood culture [[id number]] Collected: 01/16/25 1006    Order Status: Sent Specimen: Blood     Blood culture [[id number]] Collected: 01/16/25 1006    Order Status: Sent Specimen: Blood     MRSA Screen by PCR [[id number]] Collected: 01/15/25 1630    Order Status: Completed Specimen: Nasal Swab Updated: 01/15/25 1852     MRSA SCREEN BY PCR Not Detected    Culture, Anaerobe [[id number]]     Order Status: Canceled Specimen: Body Fluid from Abdomen     Aerobic culture [[id number]]     Order Status: Canceled Specimen: Incision site

## 2025-01-16 NOTE — PROGRESS NOTES
Pharmacokinetic Assessment Follow Up: IV Vancomycin     Antimicrobial therapy with vancomycin discontinued.   Vancomycin dosing by pharmacy consult will sign-off.   Please reconsult if necessary.   Thank you for allowing us to participate in this patient's care.      Thank you for the consult,   Yany Perkins, PharmD, [facility]  Heart Transplant Clinical Specialist   Spectralink: [phone]

## 2025-01-16 NOTE — SUBJECTIVE & OBJECTIVE
Interval History: NAOEN. Blood cultures growing staph. Still quite volume overloaded on exam. Will plan to augment diuresis with PO metolazone this am. Replace electrolytes as needed.     Continuous Infusions:   furosemide (Lasix) 500 mg in 50 mL infusion (conc: 10 mg/mL)  40 mg/hr Intravenous Continuous 4 mL/hr at 01/15/25 2219 40 mg/hr at 01/15/25 2219     Scheduled Meds:   amiodarone  200 mg Oral Daily    amLODIPine  10 mg Oral Daily    aspirin  81 mg Oral Daily    atorvastatin  40 mg Oral Daily    DULoxetine  30 mg Oral Daily    empagliflozin  10 mg Oral Daily    eplerenone  50 mg Oral Daily    gabapentin  100 mg Oral BID    And    gabapentin  400 mg Oral QHS    insulin aspart U-100  10 Units Subcutaneous TID WM    insulin glargine U-100  10 Units Subcutaneous Daily    levETIRAcetam  1,500 mg Oral BID    magnesium oxide  400 mg Oral BID    metOLazone  10 mg Oral Once    mupirocin   Nasal BID    pantoprazole  40 mg Oral Daily    piperacillin-tazobactam (Zosyn) IV (PEDS and ADULTS) (extended infusion is not appropriate)  4.5 g Intravenous Q8H    potassium chloride  20 mEq Oral BID    warfarin  1.5 mg Oral Daily     PRN Meds:  Current Facility-Administered Medications:     acetaminophen, 650 mg, Oral, Q6H PRN    dextrose 50%, 12.5 g, Intravenous, PRN    dextrose 50%, 25 g, Intravenous, PRN    glucagon (human recombinant), 1 mg, Intramuscular, PRN    glucose, 16 g, Oral, PRN    glucose, 24 g, Oral, PRN    insulin aspart U-100, 10 Units, Subcutaneous, TID WM **AND** insulin aspart U-100, 0-10 Units, Subcutaneous, PRN    ondansetron, 4 mg, Oral, Q8H PRN    senna-docusate 8.6-50 mg, 1 tablet, Oral, Daily PRN    Pharmacy to dose Vancomycin consult, , , Once **AND** vancomycin - pharmacy to dose, , Intravenous, pharmacy to manage frequency    Review of patient's allergies indicates:   Allergen Reactions    Bumex [bumetanide] Hives     Objective:     Vital Signs (Most Recent):  Temp: 98.4 °F (36.9 °C) (01/16/25  0329)  Pulse: 88 (01/16/25 0400)  Resp: 18 (01/16/25 0329)  BP: (!) 76/0 (01/16/25 0355)  SpO2: 97 % (01/16/25 0329) Vital Signs (24h Range):  Temp:  [98 °F (36.7 °C)-99 °F (37.2 °C)] 98.4 °F (36.9 °C)  Pulse:  [72-96] 88  Resp:  [18] 18  SpO2:  [96 %-98 %] 97 %  BP: (72-91)/(0-61) 76/0     Patient Vitals for the past 72 hrs (Last 3 readings):   Weight   01/16/25 0345 71.5 kg (157 lb 10.1 oz)   01/15/25 0300 74 kg (163 lb 2.3 oz)     Body mass index is 19.7 kg/m².      Intake/Output Summary (Last 24 hours) at 1/16/2025 0815  Last data filed at 1/16/2025 0600  Gross per 24 hour   Intake 2100 ml   Output 6950 ml   Net -4850 ml       Telemetry: NSR 90s       Physical Exam  Vitals and nursing note reviewed.   Constitutional:       Appearance: He is well-developed.   HENT:      Head: Normocephalic.   Eyes:      Pupils: Pupils are equal, round, and reactive to light.   Cardiovascular:      Rate and Rhythm: Normal rate and regular rhythm.      Heart sounds: Murmur heard.      Comments: VAD hum  Pulmonary:      Effort: Pulmonary effort is normal.      Breath sounds: Normal breath sounds.   Abdominal:      General: Bowel sounds are normal. There is distension.      Palpations: Abdomen is soft.   Musculoskeletal:         General: Normal range of motion.      Cervical back: Normal range of motion and neck supple.      Right lower leg: Edema present.      Left lower leg: Edema present.   Skin:     General: Skin is warm and dry.   Neurological:      Mental Status: He is alert and oriented to person, place, and time.   Psychiatric:         Behavior: Behavior normal.            Significant Labs:  CBC:  Recent Labs   Lab 01/14/25  0250 01/15/25  0740 01/16/25  0421   WBC 27.21  27.21* 15.46* 10.98   RBC 4.85 4.29* 4.58*   HGB 8.8* 7.9* 8.3*   HCT 31.0* 26.1* 28.4*    217 228   MCV 63.9* 61* 62*   MCH 18.1* 18.4* 18.1*   MCHC 28.4* 30.3* 29.2*     BNP:  Recent Labs   Lab 01/14/25  0250 01/15/25  0436   BNP 1,009.6* 1,157*      CMP:  Recent Labs   Lab 01/14/25  0250 01/15/25  0436 01/15/25  0740 01/16/25  0421   GLU  --   --  182* 101   CALCIUM 8.7  --  8.3* 8.6*   ALBUMIN 2.6* 2.4* 2.2*  --    PROT  --  8.6* 7.9  --      --  132* 136   K 3.5  --  3.4* 3.2*   CO2 22  --  25 27     --  100 97   BUN 17.7  --  19 17   CREATININE 1.61*  --  1.3 1.2   ALKPHOS 191* 150 141  --    ALT 12 14 14  --    AST 32 35 31  --    BILITOT 2.2* 2.4* 2.3*  --       Coagulation:   Recent Labs   Lab 01/14/25  2007 01/15/25  0740 01/16/25  0421   INR 2.5* 2.1*  2.1* 2.0*   APTT  --   --  33.0*     LDH:  Recent Labs   Lab 01/16/25  0421        Microbiology:  Microbiology Results (last 7 days)       Procedure Component Value Units Date/Time    Blood culture [[id number]]     Order Status: Sent Specimen: Blood     Blood culture [[id number]]     Order Status: Sent Specimen: Blood     MRSA Screen by PCR [[id number]] Collected: 01/15/25 1630    Order Status: Completed Specimen: Nasal Swab Updated: 01/15/25 1852     MRSA SCREEN BY PCR Not Detected    Culture, Anaerobe [[id number]]     Order Status: Canceled Specimen: Body Fluid from Abdomen     Aerobic culture [[id number]]     Order Status: Canceled Specimen: Incision site             I have reviewed all pertinent labs within the past 24 hours.    Estimated Creatinine Clearance: 83.6 mL/min (based on SCr of 1.2 mg/dL).    Diagnostic Results:  I have reviewed all pertinent imaging results/findings within the past 24 hours.

## 2025-01-16 NOTE — PROGRESS NOTES
Pt with headache at 2045, PRN tylenol given. At hour recheck pt headache resolved completely. MD notified.

## 2025-01-16 NOTE — PROGRESS NOTES
01/16/2025  Mirela Perez    Current provider:  Josh Ryan, *    Device interrogation:      1/16/2025    11:59 AM 1/16/2025     8:00 AM 1/16/2025     3:56 AM 1/15/2025    11:58 PM 1/15/2025     8:32 PM 1/15/2025     4:18 PM 1/15/2025     3:26 PM   TXP LVAD INTERROGATIONS   Type HeartMate3 HeartMate3 HeartMate3 HeartMate3 HeartMate3 HeartMate3 HeartMate3   Flow 4.4 4.1 3.9 4.1 4.4 4.1 4.5   Speed 5100 5100 5100 5100 5100 5100 5100   PI 3.7 4.9 5.9 5.3 3.9 4.7 3.9   Power (Serna) 3.6 3.6 3.6 3.6 3.7 3.6 3.6   LSL 4700 4700 4700 4700 4700 4700 4700   Pulsatility Intermittent pulse Intermittent pulse No Pulse No Pulse No Pulse No Pulse No Pulse          Rounded on Kevan Queen to ensure all mechanical assist device settings (IABP or VAD) were appropriate and all parameters were within limits.  I was able to ensure all back up equipment was present, the staff had no issues, and the Perfusion Department daily rounding was complete.      For implantable VADs: Interrogation of Ventricular assist device was performed with analysis of device parameters and review of device function. I have personally reviewed the interrogation findings and agree with findings as stated.     In emergency, the nursing units have been notified to contact the perfusion department either by:  Calling [id number] from 630am to 4pm Mon thru Fri, utilizing the On-Call Finder functionality of Epic and searching for Perfusion, or by contacting the hospital  from 4pm to 630am and on weekends and asking to speak with the perfusionist on call.    1:09 PM

## 2025-01-16 NOTE — PLAN OF CARE
Recommendations    Continue cardiac diet as tolerated.  RD to monitor and follow up.    Goals: Meet % EEN/EPN by RD follow up.  Nutrition Goal Status: new  Communication of RD Recs: other (comment) (POC)

## 2025-01-16 NOTE — HPI
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 He developed nausea and abdominal pain acutely after eating. States he also developed abdominal distention and pain since his recent discharge on 1/9. He states he has been consistently taking his cefadroxil and denies missed doses. He states his DLES is unchanged, has some purulent discharged and pain, but not significantly worse than usual. Has a headache and some blurry vision. No neck pain or joint pain. No indwelling hardware. He denies injection drug use. He denies having painful IV sites during/after his recent admission.

## 2025-01-16 NOTE — PROGRESS NOTES
"LVAD DLES dressing changed under sterile technique using VAD kit. DLES is a "1". Pt tolerated well, no bleeding noted, no active drainage noted. Dressing due to be changed 1/17/25.   "

## 2025-01-17 LAB
ALBUMIN SERPL BCP-MCNC: 2.8 G/DL (ref 3.5–5.2)
ALP SERPL-CCNC: 193 U/L (ref 40–150)
ALT SERPL W/O P-5'-P-CCNC: 17 U/L (ref 10–44)
ANION GAP SERPL CALC-SCNC: 12 MMOL/L (ref 8–16)
ANION GAP SERPL CALC-SCNC: 14 MMOL/L (ref 8–16)
APTT PPP: 31.9 SEC (ref 21–32)
AST SERPL-CCNC: 35 U/L (ref 10–40)
BASOPHILS # BLD AUTO: 0.05 K/UL (ref 0–0.2)
BASOPHILS NFR BLD: 0.5 % (ref 0–1.9)
BILIRUB DIRECT SERPL-MCNC: 1.3 MG/DL (ref 0.1–0.3)
BILIRUB SERPL-MCNC: 1.9 MG/DL (ref 0.1–1)
BNP SERPL-MCNC: 717 PG/ML (ref 0–99)
BUN SERPL-MCNC: 23 MG/DL (ref 6–20)
BUN SERPL-MCNC: 27 MG/DL (ref 6–20)
CALCIUM SERPL-MCNC: 9.6 MG/DL (ref 8.7–10.5)
CALCIUM SERPL-MCNC: 9.7 MG/DL (ref 8.7–10.5)
CHLORIDE SERPL-SCNC: 86 MMOL/L (ref 95–110)
CHLORIDE SERPL-SCNC: 88 MMOL/L (ref 95–110)
CO2 SERPL-SCNC: 30 MMOL/L (ref 23–29)
CO2 SERPL-SCNC: 33 MMOL/L (ref 23–29)
CREAT SERPL-MCNC: 1.7 MG/DL (ref 0.5–1.4)
CREAT SERPL-MCNC: 1.7 MG/DL (ref 0.5–1.4)
CRP SERPL-MCNC: 71.6 MG/L (ref 0–8.2)
DIFFERENTIAL METHOD BLD: ABNORMAL
EOSINOPHIL # BLD AUTO: 0 K/UL (ref 0–0.5)
EOSINOPHIL NFR BLD: 0.4 % (ref 0–8)
ERYTHROCYTE [DISTWIDTH] IN BLOOD BY AUTOMATED COUNT: 24 % (ref 11.5–14.5)
EST. GFR  (NO RACE VARIABLE): 51.9 ML/MIN/1.73 M^2
EST. GFR  (NO RACE VARIABLE): 51.9 ML/MIN/1.73 M^2
GLUCOSE SERPL-MCNC: 109 MG/DL (ref 70–110)
GLUCOSE SERPL-MCNC: 129 MG/DL (ref 70–110)
HCT VFR BLD AUTO: 32.4 % (ref 40–54)
HGB BLD-MCNC: 9.6 G/DL (ref 14–18)
IMM GRANULOCYTES # BLD AUTO: 0.03 K/UL (ref 0–0.04)
IMM GRANULOCYTES NFR BLD AUTO: 0.3 % (ref 0–0.5)
INR PPP: 2.1 (ref 0.8–1.2)
LDH SERPL L TO P-CCNC: 331 U/L (ref 110–260)
LYMPHOCYTES # BLD AUTO: 1.1 K/UL (ref 1–4.8)
LYMPHOCYTES NFR BLD: 9.9 % (ref 18–48)
MAGNESIUM SERPL-MCNC: 2.2 MG/DL (ref 1.6–2.6)
MAGNESIUM SERPL-MCNC: 2.4 MG/DL (ref 1.6–2.6)
MCH RBC QN AUTO: 17.9 PG (ref 27–31)
MCHC RBC AUTO-ENTMCNC: 29.6 G/DL (ref 32–36)
MCV RBC AUTO: 61 FL (ref 82–98)
MONOCYTES # BLD AUTO: 1.1 K/UL (ref 0.3–1)
MONOCYTES NFR BLD: 9.8 % (ref 4–15)
NEUTROPHILS # BLD AUTO: 8.6 K/UL (ref 1.8–7.7)
NEUTROPHILS NFR BLD: 79.1 % (ref 38–73)
NRBC BLD-RTO: 0 /100 WBC
PHOSPHATE SERPL-MCNC: 3.9 MG/DL (ref 2.7–4.5)
PLATELET # BLD AUTO: 272 K/UL (ref 150–450)
PMV BLD AUTO: ABNORMAL FL (ref 9.2–12.9)
POCT GLUCOSE: 149 MG/DL (ref 70–110)
POCT GLUCOSE: 167 MG/DL (ref 70–110)
POCT GLUCOSE: 179 MG/DL (ref 70–110)
POCT GLUCOSE: 181 MG/DL (ref 70–110)
POCT GLUCOSE: 184 MG/DL (ref 70–110)
POTASSIUM SERPL-SCNC: 3 MMOL/L (ref 3.5–5.1)
POTASSIUM SERPL-SCNC: 3.2 MMOL/L (ref 3.5–5.1)
PREALB SERPL-MCNC: 12 MG/DL (ref 20–43)
PROT SERPL-MCNC: 10.4 G/DL (ref 6–8.4)
PROTHROMBIN TIME: 21.4 SEC (ref 9–12.5)
RBC # BLD AUTO: 5.35 M/UL (ref 4.6–6.2)
SODIUM SERPL-SCNC: 131 MMOL/L (ref 136–145)
SODIUM SERPL-SCNC: 132 MMOL/L (ref 136–145)
WBC # BLD AUTO: 10.81 K/UL (ref 3.9–12.7)

## 2025-01-17 PROCEDURE — 83735 ASSAY OF MAGNESIUM: CPT | Mod: 91 | Performed by: PHYSICIAN ASSISTANT

## 2025-01-17 PROCEDURE — 80048 BASIC METABOLIC PNL TOTAL CA: CPT | Performed by: NURSE PRACTITIONER

## 2025-01-17 PROCEDURE — 83735 ASSAY OF MAGNESIUM: CPT | Performed by: NURSE PRACTITIONER

## 2025-01-17 PROCEDURE — 25000003 PHARM REV CODE 250: Performed by: INTERNAL MEDICINE

## 2025-01-17 PROCEDURE — 99233 SBSQ HOSP IP/OBS HIGH 50: CPT | Mod: ,,, | Performed by: PHYSICIAN ASSISTANT

## 2025-01-17 PROCEDURE — 93750 INTERROGATION VAD IN PERSON: CPT | Mod: ,,, | Performed by: INTERNAL MEDICINE

## 2025-01-17 PROCEDURE — 84100 ASSAY OF PHOSPHORUS: CPT | Performed by: STUDENT IN AN ORGANIZED HEALTH CARE EDUCATION/TRAINING PROGRAM

## 2025-01-17 PROCEDURE — 83880 ASSAY OF NATRIURETIC PEPTIDE: CPT | Performed by: STUDENT IN AN ORGANIZED HEALTH CARE EDUCATION/TRAINING PROGRAM

## 2025-01-17 PROCEDURE — 83615 LACTATE (LD) (LDH) ENZYME: CPT | Performed by: STUDENT IN AN ORGANIZED HEALTH CARE EDUCATION/TRAINING PROGRAM

## 2025-01-17 PROCEDURE — 25000003 PHARM REV CODE 250: Performed by: STUDENT IN AN ORGANIZED HEALTH CARE EDUCATION/TRAINING PROGRAM

## 2025-01-17 PROCEDURE — 25000003 PHARM REV CODE 250: Performed by: NURSE PRACTITIONER

## 2025-01-17 PROCEDURE — 80048 BASIC METABOLIC PNL TOTAL CA: CPT | Mod: 91 | Performed by: PHYSICIAN ASSISTANT

## 2025-01-17 PROCEDURE — 27000248 HC VAD-ADDITIONAL DAY

## 2025-01-17 PROCEDURE — 86140 C-REACTIVE PROTEIN: CPT | Performed by: STUDENT IN AN ORGANIZED HEALTH CARE EDUCATION/TRAINING PROGRAM

## 2025-01-17 PROCEDURE — 85730 THROMBOPLASTIN TIME PARTIAL: CPT | Performed by: STUDENT IN AN ORGANIZED HEALTH CARE EDUCATION/TRAINING PROGRAM

## 2025-01-17 PROCEDURE — 84134 ASSAY OF PREALBUMIN: CPT | Performed by: STUDENT IN AN ORGANIZED HEALTH CARE EDUCATION/TRAINING PROGRAM

## 2025-01-17 PROCEDURE — 99233 SBSQ HOSP IP/OBS HIGH 50: CPT | Mod: ,,, | Performed by: STUDENT IN AN ORGANIZED HEALTH CARE EDUCATION/TRAINING PROGRAM

## 2025-01-17 PROCEDURE — 36415 COLL VENOUS BLD VENIPUNCTURE: CPT | Performed by: PHYSICIAN ASSISTANT

## 2025-01-17 PROCEDURE — 85025 COMPLETE CBC W/AUTO DIFF WBC: CPT | Performed by: NURSE PRACTITIONER

## 2025-01-17 PROCEDURE — 63600175 PHARM REV CODE 636 W HCPCS: Performed by: INTERNAL MEDICINE

## 2025-01-17 PROCEDURE — 36415 COLL VENOUS BLD VENIPUNCTURE: CPT | Performed by: STUDENT IN AN ORGANIZED HEALTH CARE EDUCATION/TRAINING PROGRAM

## 2025-01-17 PROCEDURE — 20600001 HC STEP DOWN PRIVATE ROOM

## 2025-01-17 PROCEDURE — 25000003 PHARM REV CODE 250: Performed by: PHYSICIAN ASSISTANT

## 2025-01-17 PROCEDURE — 85610 PROTHROMBIN TIME: CPT | Performed by: NURSE PRACTITIONER

## 2025-01-17 PROCEDURE — 80076 HEPATIC FUNCTION PANEL: CPT | Performed by: NURSE PRACTITIONER

## 2025-01-17 RX ORDER — POTASSIUM CHLORIDE 20 MEQ/1
40 TABLET, EXTENDED RELEASE ORAL
Status: COMPLETED | OUTPATIENT
Start: 2025-01-17 | End: 2025-01-17

## 2025-01-17 RX ORDER — POTASSIUM CHLORIDE 750 MG/1
30 CAPSULE, EXTENDED RELEASE ORAL EVERY 4 HOURS
Status: COMPLETED | OUTPATIENT
Start: 2025-01-17 | End: 2025-01-17

## 2025-01-17 RX ADMIN — METHOCARBAMOL 500 MG: 500 TABLET ORAL at 08:01

## 2025-01-17 RX ADMIN — INSULIN ASPART 10 UNITS: 100 INJECTION, SOLUTION INTRAVENOUS; SUBCUTANEOUS at 08:01

## 2025-01-17 RX ADMIN — INSULIN ASPART 2 UNITS: 100 INJECTION, SOLUTION INTRAVENOUS; SUBCUTANEOUS at 08:01

## 2025-01-17 RX ADMIN — DULOXETINE HYDROCHLORIDE 30 MG: 30 CAPSULE, DELAYED RELEASE ORAL at 08:01

## 2025-01-17 RX ADMIN — INSULIN ASPART 10 UNITS: 100 INJECTION, SOLUTION INTRAVENOUS; SUBCUTANEOUS at 06:01

## 2025-01-17 RX ADMIN — POTASSIUM CHLORIDE 20 MEQ: 1500 TABLET, EXTENDED RELEASE ORAL at 09:01

## 2025-01-17 RX ADMIN — Medication 400 MG: at 08:01

## 2025-01-17 RX ADMIN — WARFARIN SODIUM 1.5 MG: 1 TABLET ORAL at 05:01

## 2025-01-17 RX ADMIN — GABAPENTIN 100 MG: 100 CAPSULE ORAL at 09:01

## 2025-01-17 RX ADMIN — POTASSIUM CHLORIDE 40 MEQ: 1500 TABLET, EXTENDED RELEASE ORAL at 07:01

## 2025-01-17 RX ADMIN — METHOCARBAMOL 500 MG: 500 TABLET ORAL at 05:01

## 2025-01-17 RX ADMIN — LEVETIRACETAM 1500 MG: 500 TABLET, FILM COATED ORAL at 08:01

## 2025-01-17 RX ADMIN — METHOCARBAMOL 500 MG: 500 TABLET ORAL at 12:01

## 2025-01-17 RX ADMIN — MUPIROCIN: 20 OINTMENT TOPICAL at 09:01

## 2025-01-17 RX ADMIN — Medication 400 MG: at 09:01

## 2025-01-17 RX ADMIN — INSULIN GLARGINE 10 UNITS: 100 INJECTION, SOLUTION SUBCUTANEOUS at 08:01

## 2025-01-17 RX ADMIN — DEXTROSE MONOHYDRATE 6 G: 25000 INJECTION, SOLUTION INTRAVENOUS at 04:01

## 2025-01-17 RX ADMIN — POTASSIUM CHLORIDE 30 MEQ: 750 CAPSULE, EXTENDED RELEASE ORAL at 02:01

## 2025-01-17 RX ADMIN — ACETAMINOPHEN 650 MG: 325 TABLET ORAL at 05:01

## 2025-01-17 RX ADMIN — POTASSIUM CHLORIDE 30 MEQ: 750 CAPSULE, EXTENDED RELEASE ORAL at 05:01

## 2025-01-17 RX ADMIN — INSULIN ASPART 10 UNITS: 100 INJECTION, SOLUTION INTRAVENOUS; SUBCUTANEOUS at 12:01

## 2025-01-17 RX ADMIN — GABAPENTIN 400 MG: 400 CAPSULE ORAL at 09:01

## 2025-01-17 RX ADMIN — AMLODIPINE BESYLATE 10 MG: 10 TABLET ORAL at 08:01

## 2025-01-17 RX ADMIN — GABAPENTIN 100 MG: 100 CAPSULE ORAL at 08:01

## 2025-01-17 RX ADMIN — EPLERENONE 50 MG: 25 TABLET, FILM COATED ORAL at 08:01

## 2025-01-17 RX ADMIN — POTASSIUM CHLORIDE 40 MEQ: 1500 TABLET, EXTENDED RELEASE ORAL at 08:01

## 2025-01-17 RX ADMIN — INSULIN ASPART 2 UNITS: 100 INJECTION, SOLUTION INTRAVENOUS; SUBCUTANEOUS at 06:01

## 2025-01-17 RX ADMIN — PANTOPRAZOLE SODIUM 40 MG: 40 TABLET, DELAYED RELEASE ORAL at 08:01

## 2025-01-17 RX ADMIN — ATORVASTATIN CALCIUM 40 MG: 40 TABLET, FILM COATED ORAL at 08:01

## 2025-01-17 RX ADMIN — AMIODARONE HYDROCHLORIDE 200 MG: 200 TABLET ORAL at 08:01

## 2025-01-17 RX ADMIN — EMPAGLIFLOZIN 10 MG: 10 TABLET, FILM COATED ORAL at 08:01

## 2025-01-17 RX ADMIN — ASPIRIN 81 MG: 81 TABLET, COATED ORAL at 08:01

## 2025-01-17 RX ADMIN — METHOCARBAMOL 500 MG: 500 TABLET ORAL at 09:01

## 2025-01-17 RX ADMIN — LEVETIRACETAM 1500 MG: 500 TABLET, FILM COATED ORAL at 09:01

## 2025-01-17 RX ADMIN — POTASSIUM CHLORIDE 20 MEQ: 1500 TABLET, EXTENDED RELEASE ORAL at 08:01

## 2025-01-17 NOTE — ASSESSMENT & PLAN NOTE
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 Blood cultures with GPCs, PCR with MSSA. Has new abdominal distention and ascites with pain over DLES. CT A/P without abscess, but does have ascites. No signs of phlebitis from recent admission. He affirms he has been taking his suppressive cefadroxil with no missed doses. Has a headache and some blurry vision. No neck pain or joint pain. No indwelling hardware. He denies injection drug use.     Recommendations  - continue cefazolin 2 grams q8 hours  - repeat blood cultures q48 hours until no growth x 48 hours  - would consult ophthalmology for eye exam given new onset blurry vision  - obtain TTE  - would consider paracentesis given new ascites and abdominal pain   - unlikely to be an OPAT candidate

## 2025-01-17 NOTE — PLAN OF CARE
Problem: Fall Injury Risk  Goal: Absence of Fall and Fall-Related Injury  Outcome: Progressing     Problem: Adult Inpatient Plan of Care  Goal: Plan of Care Review  Outcome: Progressing     Problem: Adult Inpatient Plan of Care  Goal: Patient-Specific Goal (Individualized)  Outcome: Progressing     Problem: Heart Failure  Goal: Fluid and Electrolyte Balance  Outcome: Progressing     Problem: Heart Failure  Goal: Effective Breathing Pattern During Sleep  Outcome: Progressing   Pt free of falls and injury. Pt denies any pain or discomfort. Pt AAOx4. Fall precautions remain in place. LVAD hum present and smooth. VAD numbers and dopplers WDL. DLES dressing CDI. Plan of care reviewed with pt.

## 2025-01-17 NOTE — PROGRESS NOTES
[facility] - Cardiac Intensive Care  Heart Transplant  Progress Note    Patient Name: Kevan Queen  MRN: [id number]  Admission Date: 1/15/2025  Hospital Length of Stay: 2 days  Attending Physician: Josh Ryan, *  Primary Care Provider: Rosio Armendariz FNP  Principal Problem:Acute on chronic systolic CHF (congestive heart failure)    Subjective:   Interval History: NAOEN. Blood cultures growing staph. ID following and changed abx from Vanc and Zosyn to Ancef.  They are recommending GUILLERMO and Ophthalmology consult (both orders placed).  Diuresing with Lasix gtt and was given metolazone yesterday.  Lasix gtt decreased to 10mg/hr due to hypokalemia and leg cramps.  His creatinine is up to 1.7 today from 1.2 yesterday and 1.3 the day before.  Will stop Lasix gtt today and monitor response.  Can consider restarting po diuretics tomorrow if renal function stable.  (His previous home dose of diuretics was Torsemide 60mg BID).     Continuous Infusions:      Scheduled Meds:   amiodarone  200 mg Oral Daily    amLODIPine  10 mg Oral Daily    aspirin  81 mg Oral Daily    atorvastatin  40 mg Oral Daily    ceFAZolin (Ancef) 6 g in D5W 500 mL CONTINUOUS INFUSION  6 g Intravenous Q24H    DULoxetine  30 mg Oral Daily    empagliflozin  10 mg Oral Daily    eplerenone  50 mg Oral Daily    gabapentin  100 mg Oral BID    And    gabapentin  400 mg Oral QHS    insulin aspart U-100  10 Units Subcutaneous TID WM    insulin glargine U-100  10 Units Subcutaneous Daily    levETIRAcetam  1,500 mg Oral BID    magnesium oxide  400 mg Oral BID    methocarbamoL  500 mg Oral QID    mupirocin   Nasal BID    pantoprazole  40 mg Oral Daily    potassium chloride  20 mEq Oral BID    warfarin  1.5 mg Oral Daily     PRN Meds:  Current Facility-Administered Medications:     acetaminophen, 650 mg, Oral, Q6H PRN    dextrose 50%, 12.5 g, Intravenous, PRN    dextrose 50%, 25 g, Intravenous, PRN    glucagon (human recombinant), 1 mg,  Intramuscular, PRN    glucose, 16 g, Oral, PRN    glucose, 24 g, Oral, PRN    insulin aspart U-100, 10 Units, Subcutaneous, TID WM **AND** insulin aspart U-100, 0-10 Units, Subcutaneous, PRN    ondansetron, 4 mg, Oral, Q8H PRN    senna-docusate 8.6-50 mg, 1 tablet, Oral, Daily PRN    Review of patient's allergies indicates:   Allergen Reactions    Bumex [bumetanide] Hives     Objective:     Vital Signs (Most Recent):  Temp: 98.3 °F (36.8 °C) (01/17/25 1216)  Pulse: 84 (01/17/25 1200)  Resp: 18 (01/17/25 1216)  BP: (!) 80/0 (01/17/25 1200)  SpO2: 99 % (01/17/25 1200) Vital Signs (24h Range):  Temp:  [97.7 °F (36.5 °C)-98.4 °F (36.9 °C)] 98.3 °F (36.8 °C)  Pulse:  [82-95] 84  Resp:  [18] 18  SpO2:  [96 %-99 %] 99 %  BP: ()/(0-72) 80/0     Patient Vitals for the past 72 hrs (Last 3 readings):   Weight   01/17/25 0746 62.7 kg (138 lb 3.7 oz)   01/17/25 0505 66.2 kg (145 lb 15.1 oz)   01/16/25 0815 68.1 kg (150 lb 2.1 oz)     Body mass index is 17.28 kg/m².      Intake/Output Summary (Last 24 hours) at 1/17/2025 1258  Last data filed at 1/17/2025 1123  Gross per 24 hour   Intake 2061.25 ml   Output 8325 ml   Net -6263.75 ml       Telemetry: NSR 90s       Physical Exam  Vitals and nursing note reviewed.   Constitutional:       Appearance: He is well-developed.   HENT:      Head: Normocephalic.   Eyes:      Pupils: Pupils are equal, round, and reactive to light.   Cardiovascular:      Rate and Rhythm: Normal rate and regular rhythm.      Heart sounds: Murmur heard.      Comments: VAD hum  Pulmonary:      Effort: Pulmonary effort is normal.      Breath sounds: Normal breath sounds.   Abdominal:      General: Bowel sounds are normal. There is distension.      Palpations: Abdomen is soft.   Musculoskeletal:         General: Normal range of motion.      Cervical back: Normal range of motion and neck supple.      Right lower leg: Edema present.      Left lower leg: Edema present.   Skin:     General: Skin is warm and dry.    Neurological:      Mental Status: He is alert and oriented to person, place, and time.   Psychiatric:         Behavior: Behavior normal.            Significant Labs:  CBC:  Recent Labs   Lab 01/15/25  0740 01/16/25  0421 01/17/25  0512   WBC 15.46* 10.98 10.81   RBC 4.29* 4.58* 5.35   HGB 7.9* 8.3* 9.6*   HCT 26.1* 28.4* 32.4*    228 272   MCV 61* 62* 61*   MCH 18.4* 18.1* 17.9*   MCHC 30.3* 29.2* 29.6*     BNP:  Recent Labs   Lab 01/14/25  0250 01/15/25  0436 01/17/25  0512   BNP 1,009.6* 1,157* 717*     CMP:  Recent Labs   Lab 01/15/25  0436 01/15/25  0740 01/15/25  0740 01/16/25  0421 01/16/25  1621 01/17/25  0512   GLU  --  182*  --  101  --  129*   CALCIUM  --  8.3*  --  8.6*  --  9.6   ALBUMIN 2.4* 2.2*  --   --   --  2.8*   PROT 8.6* 7.9  --   --   --  10.4*   NA  --  132*  --  136  --  132*   K  --  3.4*   < > 3.2* 3.4* 3.0*   CO2  --  25  --  27  --  30*   CL  --  100  --  97  --  88*   BUN  --  19  --  17  --  23*   CREATININE  --  1.3  --  1.2  --  1.7*   ALKPHOS 150 141  --   --   --  193*   ALT 14 14  --   --   --  17   AST 35 31  --   --   --  35   BILITOT 2.4* 2.3*  --   --   --  1.9*    < > = values in this interval not displayed.      Coagulation:   Recent Labs   Lab 01/15/25  0740 01/16/25  0421 01/17/25  0512   INR 2.1*  2.1* 2.0* 2.1*   APTT  --  33.0* 31.9     LDH:  Recent Labs   Lab 01/16/25 0421 01/17/25  0512    331*     Microbiology:  Microbiology Results (last 7 days)       Procedure Component Value Units Date/Time    Blood culture [[id number]] Collected: 01/16/25 1006    Order Status: Completed Specimen: Blood Updated: 01/17/25 1212     Blood Culture, Routine No Growth to date      No Growth to date    Blood culture [[id number]] Collected: 01/16/25 1006    Order Status: Completed Specimen: Blood Updated: 01/17/25 1212     Blood Culture, Routine No Growth to date      No Growth to date    MRSA Screen by PCR [[id number]] Collected: 01/15/25 1630    Order Status: Completed  Specimen: Nasal Swab Updated: 01/15/25 1852     MRSA SCREEN BY PCR Not Detected    Culture, Anaerobe [[id number]]     Order Status: Canceled Specimen: Body Fluid from Abdomen     Aerobic culture [[id number]]     Order Status: Canceled Specimen: Incision site             I have reviewed all pertinent labs within the past 24 hours.    Estimated Creatinine Clearance: 51.7 mL/min (A) (based on SCr of 1.7 mg/dL (H)).    Diagnostic Results:  I have reviewed all pertinent imaging results/findings within the past 24 hours.  Assessment and Plan:     No notes on file    * Acute on chronic systolic CHF (congestive heart failure)  -NICM  -Last 2D Echo  8/26/24 : LVEF 5-10%, LVEDD  7.2 cm  -Diuresed with Lasix gtt and was given Metolazone 1/16.  Lasix gtt decreased to 10mg/hr due to hypokalemia and leg cramping.  Looks more euvolemic today.  Will stop Lasix gtt and consider starting po diuretics tomorrow.  (Previous home diuretics dose was Torsemide 60mg BID)  -GDMT with Eplerenone  -2g Na dietary restriction, 1500 mL fluid restriction, strict I/Os      LVAD (left ventricular assist device) present  HeartMate 3 Implanted 6/23/2022 with early RV failure requiring RVAD with ProTek Duo   -Continue Coumadin,  Goal INR 2.0-3.0 . Therapeutic today.  Previous home regimen 1.5mg Qdaily  -Antiplatelets ASA 81 mg, on hold with GIB  -LDH is stable overall today. Will continue to monitor daily.  -Speed set at 5100  -Interrogation notable for no events  -Not listed for OHTx    Procedure: Device Interrogation Including analysis of device parameters  Current Settings: Ventricular Assist Device        1/17/2025    12:00 PM 1/17/2025     7:48 AM 1/17/2025     3:55 AM 1/16/2025    11:06 PM 1/16/2025     7:15 PM 1/16/2025     4:00 PM 1/16/2025    11:59 AM   TXP LVAD INTERROGATIONS   Type HeartMate3 HeartMate3 HeartMate3 HeartMate3 HeartMate3 HeartMate3 HeartMate3   Flow 3.8 4.4 3.9 3.8 3.9 4.1 4.4   Speed 5100 5100 5100 5100 5150 5100 5100   PI  6.4 5 6.5 6.6 6.2 5 3.7   Power (Serna) 3.5 3.6 3.6 3.6 3.6 3.6 3.6   LSL 4700 4700 4700 4700 4700 4700 4700   Pulsatility Intermittent pulse Intermittent pulse No Pulse Pulse No Pulse Intermittent pulse Intermittent pulse         MSSA bacteremia  -Blood Cx 1/14 growing staph.   -Repeat Blood Cx 1/16 negative thus far  -Started on Vanc/Zosyn.  -ID consult.   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

## 2025-01-17 NOTE — SUBJECTIVE & OBJECTIVE
Interval History: NAOEN. Blood cultures growing staph. ID following and changed abx from Vanc and Zosyn to Ancef.  They are recommending GUILLERMO and Ophthalmology consult (both orders placed).  Diuresing with Lasix gtt and was given metolazone yesterday.  Lasix gtt decreased to 10mg/hr due to hypokalemia and leg cramps.  His creatinine is up to 1.7 today from 1.2 yesterday and 1.3 the day before.  Will stop Lasix gtt today and monitor response.  Can consider restarting po diuretics tomorrow if renal function stable.  (His previous home dose of diuretics was Torsemide 60mg BID).     Continuous Infusions:      Scheduled Meds:   amiodarone  200 mg Oral Daily    amLODIPine  10 mg Oral Daily    aspirin  81 mg Oral Daily    atorvastatin  40 mg Oral Daily    ceFAZolin (Ancef) 6 g in D5W 500 mL CONTINUOUS INFUSION  6 g Intravenous Q24H    DULoxetine  30 mg Oral Daily    empagliflozin  10 mg Oral Daily    eplerenone  50 mg Oral Daily    gabapentin  100 mg Oral BID    And    gabapentin  400 mg Oral QHS    insulin aspart U-100  10 Units Subcutaneous TID WM    insulin glargine U-100  10 Units Subcutaneous Daily    levETIRAcetam  1,500 mg Oral BID    magnesium oxide  400 mg Oral BID    methocarbamoL  500 mg Oral QID    mupirocin   Nasal BID    pantoprazole  40 mg Oral Daily    potassium chloride  20 mEq Oral BID    warfarin  1.5 mg Oral Daily     PRN Meds:  Current Facility-Administered Medications:     acetaminophen, 650 mg, Oral, Q6H PRN    dextrose 50%, 12.5 g, Intravenous, PRN    dextrose 50%, 25 g, Intravenous, PRN    glucagon (human recombinant), 1 mg, Intramuscular, PRN    glucose, 16 g, Oral, PRN    glucose, 24 g, Oral, PRN    insulin aspart U-100, 10 Units, Subcutaneous, TID WM **AND** insulin aspart U-100, 0-10 Units, Subcutaneous, PRN    ondansetron, 4 mg, Oral, Q8H PRN    senna-docusate 8.6-50 mg, 1 tablet, Oral, Daily PRN    Review of patient's allergies indicates:   Allergen Reactions    Bumex [bumetanide] Hives      Objective:     Vital Signs (Most Recent):  Temp: 98.3 °F (36.8 °C) (01/17/25 1216)  Pulse: 84 (01/17/25 1200)  Resp: 18 (01/17/25 1216)  BP: (!) 80/0 (01/17/25 1200)  SpO2: 99 % (01/17/25 1200) Vital Signs (24h Range):  Temp:  [97.7 °F (36.5 °C)-98.4 °F (36.9 °C)] 98.3 °F (36.8 °C)  Pulse:  [82-95] 84  Resp:  [18] 18  SpO2:  [96 %-99 %] 99 %  BP: ()/(0-72) 80/0     Patient Vitals for the past 72 hrs (Last 3 readings):   Weight   01/17/25 0746 62.7 kg (138 lb 3.7 oz)   01/17/25 0505 66.2 kg (145 lb 15.1 oz)   01/16/25 0815 68.1 kg (150 lb 2.1 oz)     Body mass index is 17.28 kg/m².      Intake/Output Summary (Last 24 hours) at 1/17/2025 1258  Last data filed at 1/17/2025 1123  Gross per 24 hour   Intake 2061.25 ml   Output 8325 ml   Net -6263.75 ml       Telemetry: NSR 90s       Physical Exam  Vitals and nursing note reviewed.   Constitutional:       Appearance: He is well-developed.   HENT:      Head: Normocephalic.   Eyes:      Pupils: Pupils are equal, round, and reactive to light.   Cardiovascular:      Rate and Rhythm: Normal rate and regular rhythm.      Heart sounds: Murmur heard.      Comments: VAD hum  Pulmonary:      Effort: Pulmonary effort is normal.      Breath sounds: Normal breath sounds.   Abdominal:      General: Bowel sounds are normal. There is distension.      Palpations: Abdomen is soft.   Musculoskeletal:         General: Normal range of motion.      Cervical back: Normal range of motion and neck supple.      Right lower leg: Edema present.      Left lower leg: Edema present.   Skin:     General: Skin is warm and dry.   Neurological:      Mental Status: He is alert and oriented to person, place, and time.   Psychiatric:         Behavior: Behavior normal.            Significant Labs:  CBC:  Recent Labs   Lab 01/15/25  0740 01/16/25  0421 01/17/25  0512   WBC 15.46* 10.98 10.81   RBC 4.29* 4.58* 5.35   HGB 7.9* 8.3* 9.6*   HCT 26.1* 28.4* 32.4*    228 272   MCV 61* 62* 61*   MCH  18.4* 18.1* 17.9*   MCHC 30.3* 29.2* 29.6*     BNP:  Recent Labs   Lab 01/14/25  0250 01/15/25  0436 01/17/25  0512   BNP 1,009.6* 1,157* 717*     CMP:  Recent Labs   Lab 01/15/25  0436 01/15/25  0740 01/15/25  0740 01/16/25  0421 01/16/25  1621 01/17/25 0512   GLU  --  182*  --  101  --  129*   CALCIUM  --  8.3*  --  8.6*  --  9.6   ALBUMIN 2.4* 2.2*  --   --   --  2.8*   PROT 8.6* 7.9  --   --   --  10.4*   NA  --  132*  --  136  --  132*   K  --  3.4*   < > 3.2* 3.4* 3.0*   CO2  --  25  --  27  --  30*   CL  --  100  --  97  --  88*   BUN  --  19  --  17  --  23*   CREATININE  --  1.3  --  1.2  --  1.7*   ALKPHOS 150 141  --   --   --  193*   ALT 14 14  --   --   --  17   AST 35 31  --   --   --  35   BILITOT 2.4* 2.3*  --   --   --  1.9*    < > = values in this interval not displayed.      Coagulation:   Recent Labs   Lab 01/15/25  0740 01/16/25  0421 01/17/25 0512   INR 2.1*  2.1* 2.0* 2.1*   APTT  --  33.0* 31.9     LDH:  Recent Labs   Lab 01/16/25  0421 01/17/25  0512    331*     Microbiology:  Microbiology Results (last 7 days)       Procedure Component Value Units Date/Time    Blood culture [[id number]] Collected: 01/16/25 1006    Order Status: Completed Specimen: Blood Updated: 01/17/25 1212     Blood Culture, Routine No Growth to date      No Growth to date    Blood culture [[id number]] Collected: 01/16/25 1006    Order Status: Completed Specimen: Blood Updated: 01/17/25 1212     Blood Culture, Routine No Growth to date      No Growth to date    MRSA Screen by PCR [[id number]] Collected: 01/15/25 1630    Order Status: Completed Specimen: Nasal Swab Updated: 01/15/25 1852     MRSA SCREEN BY PCR Not Detected    Culture, Anaerobe [[id number]]     Order Status: Canceled Specimen: Body Fluid from Abdomen     Aerobic culture [[id number]]     Order Status: Canceled Specimen: Incision site             I have reviewed all pertinent labs within the past 24 hours.    Estimated Creatinine Clearance: 51.7  mL/min (A) (based on SCr of 1.7 mg/dL (H)).    Diagnostic Results:  I have reviewed all pertinent imaging results/findings within the past 24 hours.

## 2025-01-17 NOTE — NURSING
Pt nose bleeding some and team at bedside, Zenobia FALK assessed pt and applied petroleum gauze and  nose bleeding stopped after that. Gauze removed by pt. No active bleeding noted.

## 2025-01-17 NOTE — PLAN OF CARE
VSS. LVAD number and doppler WNL. LVAD dressing CDI. K and Mag replaced during the day. Continue lasix gtt at 20 mg/hr. Pt educated on fall risk and remained free from falls/trauma/injury. Denies chest pain, SOB, palpitations, dizziness, pain, or discomfort. Plan of care reviewed with pt, all questions answered. Bed locked in lowest position, call bell within reach, no acute distress noted, will continue to monitor.

## 2025-01-17 NOTE — PROGRESS NOTES
Update    SW returned to pt's room to f/u on HCPA form. Pt seated in chair aaox4 with drowsy affect. Family not present at this time. Pt states he has not yet completed the form and asks SW to return next week. Pt voices understanding of plan of care. Pt reports coping well and denies further needs, questions, concerns at this time and none indicated. Providing ongoing psychosocial and counseling support, education, resources, assistance and discharge planning as indicated. Following and available.

## 2025-01-17 NOTE — ASSESSMENT & PLAN NOTE
-Blood Cx 1/14 growing staph.   -Repeat Blood Cx 1/16 negative thus far  -Started on Vanc/Zosyn.  -ID consult.   -Vanc and Zosyn changed to Ancef  -GUILLERMO and ophthalmology consult ordered per ID recommendations

## 2025-01-17 NOTE — PROGRESS NOTES
01/17/25 0553        VAD 06/29/22 1115 Left ventricular assist device HeartMate 3   Placement Date/Time: 06/29/22 1115   Present Prior to Hospital Arrival?: No  Inserted by: MD  VAD Type: Left ventricular assist device  VAD Brand: HeartMate 3   Site Location Abdomen right   Site Assessment Clean;Dry;Intact;Pink;Other (Comment)   Driveline Exit Site 2  (scant dry purulent drainage on dressing)   Driveline Assessment Intact;Free of Kinks;Modular cable Clean and Locked   Dressing Status Old drainage;Clean;Dry;Intact   Dressing Intervention Sterile dressing change   Performed By RN   Dressing Change Schedule Daily   Dressing Change Due 01/18/25   Integrity dry;intact   Driveline Sanborn in use Tape   Condition CDI   Date changed 01/17/25     LVAD dressing changed with daily kit using sterile technique. Next dressing change due 1/18/25. Patient refused melendez askew, tape used as anchor per patient request.

## 2025-01-17 NOTE — PROGRESS NOTES
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 Blood cultures with GPCs, PCR with MSSA. Has new abdominal distention and ascites with pain over DLES. CT A/P without abscess, but does have ascites. No signs of phlebitis from recent admission. He affirms he has been taking his suppressive cefadroxil with no missed doses. Has a headache and some blurry vision. No neck pain or joint pain. No indwelling hardware. He denies injection drug use.     Recommendations  - continue cefazolin 2 grams q8 hours  - repeat blood cultures q48 hours until no growth x 48 hours  - would consult ophthalmology for eye exam given new onset blurry vision  - obtain TTE  - would consider paracentesis given new ascites and abdominal pain   - unlikely to be an OPAT candidate         Above discussed with primary team.     Time: 50 minutes   50% of time spent on face-to-face counseling and coordination of care. Counseling included review of test results, diagnosis, and treatment plan with patient and/or family.  I have reviewed hospital notes from HTS service and other specialty providers as well as outside medical records. I have also reviewed CBC, CMP/BMP,  cultures and imaging with my interpretation as documented. Patient is high risk of morbidity, on antibiotics requiring intensive monitoring for toxicity.     Anticipated Disposition: TBD    Thank you for your consult. I will follow-up with patient. Please contact  us if you have any additional questions.    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 He developed nausea and abdominal pain acutely after eating. States he also developed abdominal distention and pain since his recent discharge on 1/9. He states he has been consistently taking his cefadroxil and denies missed doses. He states his DLES is unchanged, has some purulent discharged and pain, but not significantly worse than usual. Has a headache and some blurry vision. No neck pain or joint pain. No indwelling hardware. He denies injection drug use. He denies having painful IV sites during/after his recent admission.   Interval History: reports worsening blurry vision. Abdominal pain is stable/improved. Denies any other new symptoms.     Review of Systems   Constitutional:  Positive for fatigue. Negative for fever.   Eyes:  Positive for visual disturbance.   Gastrointestinal:  Positive for abdominal pain. Negative for diarrhea.   Musculoskeletal:  Negative for gait problem, joint swelling and myalgias.   Skin: Negative.      Objective:     Vital Signs (Most Recent):  Temp: 98.3 °F (36.8 °C) (01/17/25 1216)  Pulse: 86 (01/17/25 1509)  Resp: 18 (01/17/25 1216)  BP: (!) 80/0 (01/17/25 1200)  SpO2: 99 % (01/17/25 1200) Vital Signs (24h Range):  Temp:  [97.8 °F (36.6 °C)-98.4 °F (36.9 °C)] 98.3 °F (36.8 °C)  Pulse:  [82-94] 86  Resp:  [18] 18  SpO2:  [97 %-99 %] 99 %  BP: ()/(0-72) 80/0     Weight: 62.7 kg (138 lb 3.7 oz)  Body mass index is 17.28 kg/m².    Estimated Creatinine Clearance: 51.7 mL/min (A) (based on SCr of 1.7 mg/dL (H)).     Physical  Exam  Vitals and nursing note reviewed.   Constitutional:       Appearance: He is not ill-appearing.   HENT:      Head: Normocephalic.      Mouth/Throat:      Pharynx: No oropharyngeal exudate.   Eyes:      Conjunctiva/sclera: Conjunctivae normal.      Pupils: Pupils are equal, round, and reactive to light.   Pulmonary:      Effort: Pulmonary effort is normal. No respiratory distress.   Abdominal:      General: There is distension.      Palpations: Abdomen is soft.      Tenderness: There is abdominal tenderness.      Comments: Tenderness over driveline and RUQ   Skin:     General: Skin is warm and dry.   Neurological:      General: No focal deficit present.      Mental Status: He is alert.          Significant Labs:   Microbiology Results (last 7 days)       Procedure Component Value Units Date/Time    Blood culture [[id number]] Collected: 01/16/25 1006    Order Status: Completed Specimen: Blood Updated: 01/17/25 1212     Blood Culture, Routine No Growth to date      No Growth to date    Blood culture [[id number]] Collected: 01/16/25 1006    Order Status: Completed Specimen: Blood Updated: 01/17/25 1212     Blood Culture, Routine No Growth to date      No Growth to date    MRSA Screen by PCR [[id number]] Collected: 01/15/25 1630    Order Status: Completed Specimen: Nasal Swab Updated: 01/15/25 1852     MRSA SCREEN BY PCR Not Detected    Culture, Anaerobe [[id number]]     Order Status: Canceled Specimen: Body Fluid from Abdomen     Aerobic culture [[id number]]     Order Status: Canceled Specimen: Incision site             Significant Imaging: I have reviewed all pertinent imaging results/findings within the past 24 hours.

## 2025-01-17 NOTE — SUBJECTIVE & OBJECTIVE
Interval History: reports worsening blurry vision. Abdominal pain is stable/improved. Denies any other new symptoms.     Review of Systems   Constitutional:  Positive for fatigue. Negative for fever.   Eyes:  Positive for visual disturbance.   Gastrointestinal:  Positive for abdominal pain. Negative for diarrhea.   Musculoskeletal:  Negative for gait problem, joint swelling and myalgias.   Skin: Negative.      Objective:     Vital Signs (Most Recent):  Temp: 98.3 °F (36.8 °C) (01/17/25 1216)  Pulse: 86 (01/17/25 1509)  Resp: 18 (01/17/25 1216)  BP: (!) 80/0 (01/17/25 1200)  SpO2: 99 % (01/17/25 1200) Vital Signs (24h Range):  Temp:  [97.8 °F (36.6 °C)-98.4 °F (36.9 °C)] 98.3 °F (36.8 °C)  Pulse:  [82-94] 86  Resp:  [18] 18  SpO2:  [97 %-99 %] 99 %  BP: ()/(0-72) 80/0     Weight: 62.7 kg (138 lb 3.7 oz)  Body mass index is 17.28 kg/m².    Estimated Creatinine Clearance: 51.7 mL/min (A) (based on SCr of 1.7 mg/dL (H)).     Physical Exam  Vitals and nursing note reviewed.   Constitutional:       Appearance: He is not ill-appearing.   HENT:      Head: Normocephalic.      Mouth/Throat:      Pharynx: No oropharyngeal exudate.   Eyes:      Conjunctiva/sclera: Conjunctivae normal.      Pupils: Pupils are equal, round, and reactive to light.   Pulmonary:      Effort: Pulmonary effort is normal. No respiratory distress.   Abdominal:      General: There is distension.      Palpations: Abdomen is soft.      Tenderness: There is abdominal tenderness.      Comments: Tenderness over driveline and RUQ   Skin:     General: Skin is warm and dry.   Neurological:      General: No focal deficit present.      Mental Status: He is alert.          Significant Labs:   Microbiology Results (last 7 days)       Procedure Component Value Units Date/Time    Blood culture [[id number]] Collected: 01/16/25 1006    Order Status: Completed Specimen: Blood Updated: 01/17/25 1212     Blood Culture, Routine No Growth to date      No Growth to date     Blood culture [[id number]] Collected: 01/16/25 1006    Order Status: Completed Specimen: Blood Updated: 01/17/25 1212     Blood Culture, Routine No Growth to date      No Growth to date    MRSA Screen by PCR [[id number]] Collected: 01/15/25 1630    Order Status: Completed Specimen: Nasal Swab Updated: 01/15/25 1852     MRSA SCREEN BY PCR Not Detected    Culture, Anaerobe [[id number]]     Order Status: Canceled Specimen: Body Fluid from Abdomen     Aerobic culture [[id number]]     Order Status: Canceled Specimen: Incision site             Significant Imaging: I have reviewed all pertinent imaging results/findings within the past 24 hours.

## 2025-01-17 NOTE — NURSING
Pt c/o irritated skin under LVAD dressing, and request RN to change it even though it was change around 0600. Pt's VAD dressing changed per protocol using kit and sterile technique. Pt's drive line site is clean, dry, intact with no drainage or signs of infection; DLES is + (1). No kinks or frays on drive line, secured with melendez anchor. Pt tolerated dressing change well. Next dressing change due 01/18/2025. Pt stated  feel much better after dressing changed.

## 2025-01-17 NOTE — ASSESSMENT & PLAN NOTE
-NICM  -Last 2D Echo  8/26/24 : LVEF 5-10%, LVEDD  7.2 cm  -Diuresed with Lasix gtt and was given Metolazone 1/16.  Lasix gtt decreased to 10mg/hr due to hypokalemia and leg cramping.  Looks more euvolemic today.  Will stop Lasix gtt and consider starting po diuretics tomorrow.  (Previous home diuretics dose was Torsemide 60mg BID)  -GDMT with Eplerenone  -2g Na dietary restriction, 1500 mL fluid restriction, strict I/Os

## 2025-01-17 NOTE — PROGRESS NOTES
01/17/2025  Mirela Perze    Current provider:  Josh Ryan, *    Device interrogation:      1/17/2025     7:48 AM 1/17/2025     3:55 AM 1/16/2025    11:06 PM 1/16/2025     7:15 PM 1/16/2025     4:00 PM 1/16/2025    11:59 AM 1/16/2025     8:00 AM   TXP LVAD INTERROGATIONS   Type HeartMate3 HeartMate3 HeartMate3 HeartMate3 HeartMate3 HeartMate3 HeartMate3   Flow 4.4 3.9 3.8 3.9 4.1 4.4 4.1   Speed 5100 5100 5100 5150 5100 5100 5100   PI 5 6.5 6.6 6.2 5 3.7 4.9   Power (Serna) 3.6 3.6 3.6 3.6 3.6 3.6 3.6   LSL 4700 4700 4700 4700 4700 4700 4700   Pulsatility Intermittent pulse No Pulse Pulse No Pulse Intermittent pulse Intermittent pulse Intermittent pulse          Rounded on Kevan Queen to ensure all mechanical assist device settings (IABP or VAD) were appropriate and all parameters were within limits.  I was able to ensure all back up equipment was present, the staff had no issues, and the Perfusion Department daily rounding was complete.      For implantable VADs: Interrogation of Ventricular assist device was performed with analysis of device parameters and review of device function. I have personally reviewed the interrogation findings and agree with findings as stated.     In emergency, the nursing units have been notified to contact the perfusion department either by:  Calling [id number] from 630am to 4pm Mon thru Fri, utilizing the On-Call Finder functionality of Epic and searching for Perfusion, or by contacting the hospital  from 4pm to 630am and on weekends and asking to speak with the perfusionist on call.    8:58 AM

## 2025-01-17 NOTE — CARE UPDATE
Hypokalemia overnight with k 3.0; UOP total 9 L.   Decreased lasix gtt to 10.   Giving K 40 meq q2h for 2 doses

## 2025-01-17 NOTE — NURSING
K  3.0, team aware, 40 mEq K tablet given, and 60 mEq scheduled  for later and lasix gtt decreased to 10 mg/hr.

## 2025-01-17 NOTE — ASSESSMENT & PLAN NOTE
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 Therapeutic today.  Previous home regimen 1.5mg Qdaily  -Antiplatelets ASA 81 mg, on hold with GIB  -LDH is stable overall today. Will continue to monitor daily.  -Speed set at 5100  -Interrogation notable for no events  -Not listed for OHTx    Procedure: Device Interrogation Including analysis of device parameters  Current Settings: Ventricular Assist Device        1/17/2025    12:00 PM 1/17/2025     7:48 AM 1/17/2025     3:55 AM 1/16/2025    11:06 PM 1/16/2025     7:15 PM 1/16/2025     4:00 PM 1/16/2025    11:59 AM   TXP LVAD INTERROGATIONS   Type HeartMate3 HeartMate3 HeartMate3 HeartMate3 HeartMate3 HeartMate3 HeartMate3   Flow 3.8 4.4 3.9 3.8 3.9 4.1 4.4   Speed 5100 5100 5100 5100 5150 5100 5100   PI 6.4 5 6.5 6.6 6.2 5 3.7   Power (Serna) 3.5 3.6 3.6 3.6 3.6 3.6 3.6   LSL 4700 4700 4700 4700 4700 4700 4700   Pulsatility Intermittent pulse Intermittent pulse No Pulse Pulse No Pulse Intermittent pulse Intermittent pulse

## 2025-01-17 NOTE — PLAN OF CARE
Pt c/o blurry vision for few weeks now, team aware, Opthalmology consulted. VSS. LVAD number and doppler WNL. LVAD dressing changed per protocol ( see note). K replaced during the day. Lasix gtt discontinued. Pt educated on fall risk and remained free from falls/trauma/injury. Denies chest pain, SOB, palpitations, dizziness, pain, or discomfort. Plan of care reviewed with pt, all questions answered. Bed locked in lowest position, call bell within reach, no acute distress noted, will continue to monitor.

## 2025-01-18 LAB
ALBUMIN FLD-MCNC: 1.9 G/DL
ALBUMIN SERPL BCP-MCNC: 2.5 G/DL (ref 3.5–5.2)
ALP SERPL-CCNC: 173 U/L (ref 40–150)
AMPHET+METHAMPHET UR QL: NEGATIVE
AMYLASE SERPL-CCNC: 39 U/L (ref 20–110)
ANION GAP SERPL CALC-SCNC: 12 MMOL/L (ref 8–16)
ANION GAP SERPL CALC-SCNC: 12 MMOL/L (ref 8–16)
APPEARANCE FLD: NORMAL
APTT PPP: 33.1 SEC (ref 21–32)
BARBITURATES UR QL SCN>200 NG/ML: NEGATIVE
BASOPHILS # BLD AUTO: 0.04 K/UL (ref 0–0.2)
BASOPHILS NFR BLD: 0.4 % (ref 0–1.9)
BENZODIAZ UR QL SCN>200 NG/ML: NEGATIVE
BODY FLD TYPE: NORMAL
BODY FLUID SOURCE, CREATININE: NORMAL
BODY FLUID SOURCE, LDH: NORMAL
BUN SERPL-MCNC: 30 MG/DL (ref 6–20)
BUN SERPL-MCNC: 31 MG/DL (ref 6–20)
BZE UR QL SCN: NEGATIVE
CALCIUM SERPL-MCNC: 9.3 MG/DL (ref 8.7–10.5)
CALCIUM SERPL-MCNC: 9.5 MG/DL (ref 8.7–10.5)
CANNABINOIDS UR QL SCN: ABNORMAL
CHLORIDE SERPL-SCNC: 89 MMOL/L (ref 95–110)
CHLORIDE SERPL-SCNC: 89 MMOL/L (ref 95–110)
CO2 SERPL-SCNC: 28 MMOL/L (ref 23–29)
CO2 SERPL-SCNC: 30 MMOL/L (ref 23–29)
COLOR FLD: NORMAL
CREAT FLD-MCNC: 1.4 MG/DL
CREAT SERPL-MCNC: 1.6 MG/DL (ref 0.5–1.4)
CREAT SERPL-MCNC: 1.7 MG/DL (ref 0.5–1.4)
CREAT UR-MCNC: 72 MG/DL (ref 23–375)
DIFFERENTIAL METHOD BLD: ABNORMAL
EOSINOPHIL # BLD AUTO: 0.1 K/UL (ref 0–0.5)
EOSINOPHIL NFR BLD: 0.5 % (ref 0–8)
ERYTHROCYTE [DISTWIDTH] IN BLOOD BY AUTOMATED COUNT: 23.7 % (ref 11.5–14.5)
EST. GFR  (NO RACE VARIABLE): 51.9 ML/MIN/1.73 M^2
EST. GFR  (NO RACE VARIABLE): 55.9 ML/MIN/1.73 M^2
ETHANOL UR-MCNC: <10 MG/DL
GLUCOSE FLD-MCNC: 159 MG/DL
GLUCOSE SERPL-MCNC: 106 MG/DL (ref 70–110)
GLUCOSE SERPL-MCNC: 114 MG/DL (ref 70–110)
GRAM STN SPEC: NORMAL
GRAM STN SPEC: NORMAL
HCT VFR BLD AUTO: 30.7 % (ref 40–54)
HGB BLD-MCNC: 9.1 G/DL (ref 14–18)
IMM GRANULOCYTES # BLD AUTO: 0.04 K/UL (ref 0–0.04)
IMM GRANULOCYTES NFR BLD AUTO: 0.4 % (ref 0–0.5)
INR PPP: 2.2 (ref 0.8–1.2)
LDH FLD L TO P-CCNC: 171 U/L
LDH SERPL L TO P-CCNC: 238 U/L (ref 110–260)
LDH SERPL L TO P-CCNC: 282 U/L (ref 110–260)
LYMPHOCYTES # BLD AUTO: 1 K/UL (ref 1–4.8)
LYMPHOCYTES NFR BLD: 10.1 % (ref 18–48)
LYMPHOCYTES NFR FLD MANUAL: 34 %
MAGNESIUM SERPL-MCNC: 2.2 MG/DL (ref 1.6–2.6)
MCH RBC QN AUTO: 18 PG (ref 27–31)
MCHC RBC AUTO-ENTMCNC: 29.6 G/DL (ref 32–36)
MCV RBC AUTO: 61 FL (ref 82–98)
METHADONE UR QL SCN>300 NG/ML: NEGATIVE
MONOCYTES # BLD AUTO: 0.9 K/UL (ref 0.3–1)
MONOCYTES NFR BLD: 9 % (ref 4–15)
MONOS+MACROS NFR FLD MANUAL: 26 %
NEUTROPHILS # BLD AUTO: 7.5 K/UL (ref 1.8–7.7)
NEUTROPHILS NFR BLD: 79.6 % (ref 38–73)
NEUTROPHILS NFR FLD MANUAL: 40 %
NRBC BLD-RTO: 0 /100 WBC
OPIATES UR QL SCN: NEGATIVE
PCP UR QL SCN>25 NG/ML: NEGATIVE
PHOSPHATE SERPL-MCNC: 3.9 MG/DL (ref 2.7–4.5)
PLATELET # BLD AUTO: 262 K/UL (ref 150–450)
PMV BLD AUTO: ABNORMAL FL (ref 9.2–12.9)
POCT GLUCOSE: 125 MG/DL (ref 70–110)
POCT GLUCOSE: 146 MG/DL (ref 70–110)
POCT GLUCOSE: 150 MG/DL (ref 70–110)
POCT GLUCOSE: 223 MG/DL (ref 70–110)
POTASSIUM SERPL-SCNC: 3.3 MMOL/L (ref 3.5–5.1)
POTASSIUM SERPL-SCNC: 3.5 MMOL/L (ref 3.5–5.1)
PROT FLD-MCNC: 5.6 G/DL
PROT SERPL-MCNC: 9.6 G/DL (ref 6–8.4)
PROTHROMBIN TIME: 22.7 SEC (ref 9–12.5)
RBC # BLD AUTO: 5.05 M/UL (ref 4.6–6.2)
SODIUM SERPL-SCNC: 129 MMOL/L (ref 136–145)
SODIUM SERPL-SCNC: 131 MMOL/L (ref 136–145)
SPECIMEN SOURCE: NORMAL
TOXICOLOGY INFORMATION: ABNORMAL
WBC # BLD AUTO: 9.31 K/UL (ref 3.9–12.7)
WBC # BLD AUTO: 9.4 K/UL (ref 3.9–12.7)
WBC # FLD: 287 /CU MM

## 2025-01-18 PROCEDURE — 63600175 PHARM REV CODE 636 W HCPCS: Performed by: STUDENT IN AN ORGANIZED HEALTH CARE EDUCATION/TRAINING PROGRAM

## 2025-01-18 PROCEDURE — 88112 CYTOPATH CELL ENHANCE TECH: CPT | Performed by: PATHOLOGY

## 2025-01-18 PROCEDURE — 83735 ASSAY OF MAGNESIUM: CPT | Performed by: NURSE PRACTITIONER

## 2025-01-18 PROCEDURE — 89051 BODY FLUID CELL COUNT: CPT

## 2025-01-18 PROCEDURE — 87205 SMEAR GRAM STAIN: CPT

## 2025-01-18 PROCEDURE — 80048 BASIC METABOLIC PNL TOTAL CA: CPT | Performed by: NURSE PRACTITIONER

## 2025-01-18 PROCEDURE — 87102 FUNGUS ISOLATION CULTURE: CPT

## 2025-01-18 PROCEDURE — 85610 PROTHROMBIN TIME: CPT | Performed by: NURSE PRACTITIONER

## 2025-01-18 PROCEDURE — 99233 SBSQ HOSP IP/OBS HIGH 50: CPT | Mod: ,,, | Performed by: STUDENT IN AN ORGANIZED HEALTH CARE EDUCATION/TRAINING PROGRAM

## 2025-01-18 PROCEDURE — 82042 OTHER SOURCE ALBUMIN QUAN EA: CPT

## 2025-01-18 PROCEDURE — 82150 ASSAY OF AMYLASE: CPT

## 2025-01-18 PROCEDURE — 88305 TISSUE EXAM BY PATHOLOGIST: CPT | Mod: 26,,, | Performed by: PATHOLOGY

## 2025-01-18 PROCEDURE — 87070 CULTURE OTHR SPECIMN AEROBIC: CPT

## 2025-01-18 PROCEDURE — 84100 ASSAY OF PHOSPHORUS: CPT | Performed by: STUDENT IN AN ORGANIZED HEALTH CARE EDUCATION/TRAINING PROGRAM

## 2025-01-18 PROCEDURE — 85048 AUTOMATED LEUKOCYTE COUNT: CPT

## 2025-01-18 PROCEDURE — 83615 LACTATE (LD) (LDH) ENZYME: CPT | Performed by: STUDENT IN AN ORGANIZED HEALTH CARE EDUCATION/TRAINING PROGRAM

## 2025-01-18 PROCEDURE — 84075 ASSAY ALKALINE PHOSPHATASE: CPT

## 2025-01-18 PROCEDURE — 36415 COLL VENOUS BLD VENIPUNCTURE: CPT | Performed by: STUDENT IN AN ORGANIZED HEALTH CARE EDUCATION/TRAINING PROGRAM

## 2025-01-18 PROCEDURE — 88112 CYTOPATH CELL ENHANCE TECH: CPT | Mod: 26,,, | Performed by: PATHOLOGY

## 2025-01-18 PROCEDURE — 84157 ASSAY OF PROTEIN OTHER: CPT

## 2025-01-18 PROCEDURE — 27000248 HC VAD-ADDITIONAL DAY

## 2025-01-18 PROCEDURE — 82945 GLUCOSE OTHER FLUID: CPT

## 2025-01-18 PROCEDURE — 87075 CULTR BACTERIA EXCEPT BLOOD: CPT

## 2025-01-18 PROCEDURE — 83615 LACTATE (LD) (LDH) ENZYME: CPT | Mod: 91

## 2025-01-18 PROCEDURE — 25000003 PHARM REV CODE 250: Performed by: INTERNAL MEDICINE

## 2025-01-18 PROCEDURE — 63600175 PHARM REV CODE 636 W HCPCS: Performed by: INTERNAL MEDICINE

## 2025-01-18 PROCEDURE — 82040 ASSAY OF SERUM ALBUMIN: CPT

## 2025-01-18 PROCEDURE — 84155 ASSAY OF PROTEIN SERUM: CPT

## 2025-01-18 PROCEDURE — 88305 TISSUE EXAM BY PATHOLOGIST: CPT | Performed by: PATHOLOGY

## 2025-01-18 PROCEDURE — 93750 INTERROGATION VAD IN PERSON: CPT | Mod: ,,, | Performed by: INTERNAL MEDICINE

## 2025-01-18 PROCEDURE — 99233 SBSQ HOSP IP/OBS HIGH 50: CPT | Mod: 25,,, | Performed by: INTERNAL MEDICINE

## 2025-01-18 PROCEDURE — 36415 COLL VENOUS BLD VENIPUNCTURE: CPT | Mod: XB

## 2025-01-18 PROCEDURE — 25000003 PHARM REV CODE 250

## 2025-01-18 PROCEDURE — 85025 COMPLETE CBC W/AUTO DIFF WBC: CPT | Performed by: NURSE PRACTITIONER

## 2025-01-18 PROCEDURE — 82570 ASSAY OF URINE CREATININE: CPT

## 2025-01-18 PROCEDURE — 80048 BASIC METABOLIC PNL TOTAL CA: CPT | Mod: 91

## 2025-01-18 PROCEDURE — 85730 THROMBOPLASTIN TIME PARTIAL: CPT | Performed by: STUDENT IN AN ORGANIZED HEALTH CARE EDUCATION/TRAINING PROGRAM

## 2025-01-18 PROCEDURE — 25000003 PHARM REV CODE 250: Performed by: STUDENT IN AN ORGANIZED HEALTH CARE EDUCATION/TRAINING PROGRAM

## 2025-01-18 PROCEDURE — 0W9G3ZZ DRAINAGE OF PERITONEAL CAVITY, PERCUTANEOUS APPROACH: ICD-10-PCS | Performed by: STUDENT IN AN ORGANIZED HEALTH CARE EDUCATION/TRAINING PROGRAM

## 2025-01-18 PROCEDURE — 25000003 PHARM REV CODE 250: Performed by: NURSE PRACTITIONER

## 2025-01-18 PROCEDURE — 20600001 HC STEP DOWN PRIVATE ROOM

## 2025-01-18 PROCEDURE — 80307 DRUG TEST PRSMV CHEM ANLYZR: CPT

## 2025-01-18 RX ORDER — POTASSIUM CHLORIDE 20 MEQ/1
60 TABLET, EXTENDED RELEASE ORAL ONCE
Status: COMPLETED | OUTPATIENT
Start: 2025-01-18 | End: 2025-01-18

## 2025-01-18 RX ORDER — LIDOCAINE HYDROCHLORIDE 10 MG/ML
INJECTION, SOLUTION INFILTRATION; PERINEURAL
Status: COMPLETED | OUTPATIENT
Start: 2025-01-18 | End: 2025-01-18

## 2025-01-18 RX ADMIN — METHOCARBAMOL 500 MG: 500 TABLET ORAL at 01:01

## 2025-01-18 RX ADMIN — METHOCARBAMOL 500 MG: 500 TABLET ORAL at 04:01

## 2025-01-18 RX ADMIN — INSULIN ASPART 10 UNITS: 100 INJECTION, SOLUTION INTRAVENOUS; SUBCUTANEOUS at 09:01

## 2025-01-18 RX ADMIN — METHOCARBAMOL 500 MG: 500 TABLET ORAL at 08:01

## 2025-01-18 RX ADMIN — ASPIRIN 81 MG: 81 TABLET, COATED ORAL at 08:01

## 2025-01-18 RX ADMIN — Medication 400 MG: at 08:01

## 2025-01-18 RX ADMIN — POTASSIUM CHLORIDE 60 MEQ: 1500 TABLET, EXTENDED RELEASE ORAL at 03:01

## 2025-01-18 RX ADMIN — EMPAGLIFLOZIN 10 MG: 10 TABLET, FILM COATED ORAL at 08:01

## 2025-01-18 RX ADMIN — INSULIN ASPART 10 UNITS: 100 INJECTION, SOLUTION INTRAVENOUS; SUBCUTANEOUS at 12:01

## 2025-01-18 RX ADMIN — INSULIN ASPART 10 UNITS: 100 INJECTION, SOLUTION INTRAVENOUS; SUBCUTANEOUS at 05:01

## 2025-01-18 RX ADMIN — PANTOPRAZOLE SODIUM 40 MG: 40 TABLET, DELAYED RELEASE ORAL at 08:01

## 2025-01-18 RX ADMIN — LEVETIRACETAM 1500 MG: 500 TABLET, FILM COATED ORAL at 08:01

## 2025-01-18 RX ADMIN — DULOXETINE HYDROCHLORIDE 30 MG: 30 CAPSULE, DELAYED RELEASE ORAL at 08:01

## 2025-01-18 RX ADMIN — WARFARIN SODIUM 1.5 MG: 1 TABLET ORAL at 04:01

## 2025-01-18 RX ADMIN — LIDOCAINE HYDROCHLORIDE 5 ML: 10 INJECTION, SOLUTION INFILTRATION; PERINEURAL at 02:01

## 2025-01-18 RX ADMIN — ATORVASTATIN CALCIUM 40 MG: 40 TABLET, FILM COATED ORAL at 08:01

## 2025-01-18 RX ADMIN — AMIODARONE HYDROCHLORIDE 200 MG: 200 TABLET ORAL at 08:01

## 2025-01-18 RX ADMIN — DEXTROSE MONOHYDRATE 6 G: 25000 INJECTION, SOLUTION INTRAVENOUS at 03:01

## 2025-01-18 RX ADMIN — HYPROMELLOSE 2910 1 DROP: 5 SOLUTION/ DROPS OPHTHALMIC at 08:01

## 2025-01-18 RX ADMIN — INSULIN GLARGINE 10 UNITS: 100 INJECTION, SOLUTION SUBCUTANEOUS at 09:01

## 2025-01-18 RX ADMIN — GABAPENTIN 100 MG: 100 CAPSULE ORAL at 08:01

## 2025-01-18 RX ADMIN — EPLERENONE 50 MG: 25 TABLET, FILM COATED ORAL at 08:01

## 2025-01-18 RX ADMIN — MUPIROCIN: 20 OINTMENT TOPICAL at 09:01

## 2025-01-18 RX ADMIN — AMLODIPINE BESYLATE 10 MG: 10 TABLET ORAL at 08:01

## 2025-01-18 RX ADMIN — POTASSIUM CHLORIDE 20 MEQ: 1500 TABLET, EXTENDED RELEASE ORAL at 08:01

## 2025-01-18 RX ADMIN — GABAPENTIN 400 MG: 400 CAPSULE ORAL at 08:01

## 2025-01-18 RX ADMIN — INSULIN ASPART 4 UNITS: 100 INJECTION, SOLUTION INTRAVENOUS; SUBCUTANEOUS at 12:01

## 2025-01-18 NOTE — ASSESSMENT & PLAN NOTE
-Blood Cx 1/14 growing staph.   -Repeat Blood Cx 1/16 negative thus far  -Started on Vanc/Zosyn.  -ID consult.   -Vanc and Zosyn changed to Ancef  -TTE and ophthalmology consult ordered per ID recommendations   -diagnostic paracentesis orders added.

## 2025-01-18 NOTE — PROGRESS NOTES
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 Blood cultures with GPCs, PCR with MSSA. Repeat blood cultures no growth to date. Has new abdominal distention and ascites with pain over DLES. CT A/P without abscess, but does have ascites. No signs of phlebitis from recent admission. He affirms he has been taking his suppressive cefadroxil with no missed doses, however he has a history of non-adherence. Has a headache and some blurry vision. No neck pain or joint pain. No indwelling hardware. He denies injection drug use.     Recommendations  - continue cefazolin 2 grams q8 hours  - repeat blood cultures ordered  - would consult ophthalmology for eye exam given new onset blurry vision  - obtain TTE and likely GUILLERMO   - would consider paracentesis given new ascites and abdominal pain   - unlikely to be an OPAT candidate       HIV screen:ordered  Hepatitis B screen: ordered  Hepatitis C screen: ordered    Above discussed with primary team.     Time: 50 minutes   50% of time spent on face-to-face counseling and coordination of care. Counseling included review of test results, diagnosis, and treatment plan with patient and/or family.  I have reviewed hospital notes from CCU service and other specialty providers as well as outside medical records. I have also reviewed CBC, CMP/BMP,  cultures and imaging with my interpretation as documented. Patient is high risk of morbidity, on antibiotics  requiring intensive monitoring for toxicity.       Anticipated Disposition: TBD    Thank you for your consult. I will follow-up with patient. Please contact us if you have any additional questions.    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 He developed nausea and abdominal pain acutely after eating. States he also developed abdominal distention and pain since his recent discharge on 1/9. He states he has been consistently taking his cefadroxil and denies missed doses. He states his DLES is unchanged, has some purulent discharged and pain, but not significantly worse than usual. Has a headache and some blurry vision. No neck pain or joint pain. No indwelling hardware. He denies injection drug use. He denies having painful IV sites during/after his recent admission.   Interval History: had headache overnight, slept poorly. Now resolved. Continues to have blurry vision. Abdominal pain is stable, no worse. No new joint pains.     Review of Systems   All other systems reviewed and are negative.    Objective:     Vital Signs (Most Recent):  Temp: 97.9 °F (36.6 °C) (01/18/25 1220)  Pulse: 86 (01/18/25 1220)  Resp: 18 (01/18/25 1220)  BP: (!) 78/0 (01/18/25 1122)  SpO2: (!) 91 % (01/18/25 1220) Vital Signs (24h Range):  Temp:  [97.8 °F (36.6 °C)-98.3 °F (36.8 °C)] 97.9 °F (36.6 °C)  Pulse:  [79-93] 86  Resp:  [18-19] 18  SpO2:  [91 %-99 %] 91 %  BP: (78-98)/(0-73) 78/0     Weight: 62.7 kg (138 lb 3.7 oz)  Body mass index is 17.28 kg/m².    Estimated Creatinine Clearance: 51.7 mL/min (A) (based on SCr of 1.7 mg/dL (H)).     Physical Exam  Vitals reviewed.    Constitutional:       Appearance: He is ill-appearing. He is not toxic-appearing.   HENT:      Head: Normocephalic.      Mouth/Throat:      Pharynx: No oropharyngeal exudate.   Pulmonary:      Effort: Pulmonary effort is normal. No respiratory distress.   Abdominal:      General: There is distension.      Tenderness: There is abdominal tenderness.      Comments: RUQ and tenderness over driveline   Musculoskeletal:      Cervical back: Normal range of motion. No rigidity or tenderness.   Lymphadenopathy:      Cervical: No cervical adenopathy.   Skin:     General: Skin is warm and dry.      Findings: No lesion or rash.   Neurological:      General: No focal deficit present.      Mental Status: He is alert and oriented to person, place, and time.   Psychiatric:         Mood and Affect: Mood normal.          Significant Labs:   Microbiology Results (last 7 days)       Procedure Component Value Units Date/Time    Blood culture [[id number]] Collected: 01/16/25 1006    Order Status: Completed Specimen: Blood Updated: 01/18/25 1212     Blood Culture, Routine No Growth to date      No Growth to date      No Growth to date    Blood culture [[id number]] Collected: 01/16/25 1006    Order Status: Completed Specimen: Blood Updated: 01/18/25 1212     Blood Culture, Routine No Growth to date      No Growth to date      No Growth to date    Fungus culture [[id number]]     Order Status: No result Specimen: Ascites     (rule out SBP) Gram stain [[id number]]     Order Status: No result Specimen: Ascites     (rule out SBP) Culture, Anaerobic [[id number]]     Order Status: No result Specimen: Ascites     (rule out SBP) Aerobic culture [[id number]]     Order Status: No result Specimen: Ascites     Gram stain [[id number]]     Order Status: Canceled Specimen: Body Fluid     Anaerobic culture [[id number]]     Order Status: Canceled Specimen: Body Fluid     Aerobic culture [[id number]]     Order Status: Canceled Specimen: Peritoneal Fluid      MRSA Screen by PCR [[id number]] Collected: 01/15/25 1630    Order Status: Completed Specimen: Nasal Swab Updated: 01/15/25 1852     MRSA SCREEN BY PCR Not Detected    Culture, Anaerobe [[id number]]     Order Status: Canceled Specimen: Body Fluid from Abdomen     Aerobic culture [[id number]]     Order Status: Canceled Specimen: Incision site             Significant Imaging: I have reviewed all pertinent imaging results/findings within the past 24 hours.

## 2025-01-18 NOTE — H&P
Inpatient Radiology Pre-procedure Note    History of Present Illness:  Kevan Queen is a 39 y.o. male who presents for paracentesis.    Admission H&P reviewed.  Past Medical History:   Diagnosis Date    Acute respiratory failure with hypoxia 07/22/2023    Arthritis     Awareness alteration, transient 09/01/2022    Cardiomyopathy     CHF (congestive heart failure) 10/01/2020    COVID-19 06/03/2023    Diabetes mellitus     Dilated cardiomyopathy 10/26/2020    Drug abuse 10/2020    Headache 04/19/2023    Hyperglycemia 12/16/2022    Hyperosmolar hyperglycemic state (HHS) 05/25/2022    ICD (implantable cardioverter-defibrillator) in place 10/26/2020    Infected defibrillator now s/p removal Nov 2023 07/23/2023    Left ventricular assist device (LVAD) complication, initial encounter 06/05/2023    Muscle cramping 06/15/2022    Renal disorder     SOB (shortness of breath) 06/13/2022    Tingling in extremities 07/13/2022     Past Surgical History:   Procedure Laterality Date    APPLICATION OF WOUND VACUUM-ASSISTED CLOSURE DEVICE N/A 6/30/2022    Procedure: APPLICATION, WOUND VAC;  Surgeon: Luis F Paige MD;  Location: [facility] OR [address] [place] [address];  Service: Cardiovascular;  Laterality: N/A;  50 x 5 cm    CARDIAC DEFIBRILLATOR PLACEMENT      COLONOSCOPY N/A 9/25/2024    Procedure: COLONOSCOPY;  Surgeon: Drake Barton MD;  Location: [facility] ENDO (2ND FLR);  Service: Endoscopy;  Laterality: N/A;    ECHOCARDIOGRAM,TRANSESOPHAGEAL  11/9/2023    Procedure: Transesophageal echo (GUILLERMO) intra-procedure log documentation;  Surgeon: Eron Ivy MD;  Location: [facility] EP LAB;  Service: Cardiology;;    ESOPHAGOGASTRODUODENOSCOPY N/A 9/25/2024    Procedure: EGD (ESOPHAGOGASTRODUODENOSCOPY);  Surgeon: Drake Barton MD;  Location: [facility] ENDO (2ND FLR);  Service: Endoscopy;  Laterality: N/A;    EXTRACTION, ELECTRODE LEAD Left 11/9/2023    Procedure: EXTRACTION, ELECTRODE LEAD;  Surgeon: ADALID Leon MD;  Location: [facility] EP LAB;   Service: Cardiology;  Laterality: Left;  INFECTION, LEAD EXTRACTION, GUILLERMO, BSCI, ANES, EH,   *NO CTS BACKUP*    IMPLANTATION OF RIGHT VENTRICULAR ASSIST DEVICE (RVAD) N/A 6/29/2022    Procedure: INSERTION, RVAD;  Surgeon: Luis F Paige MD;  Location: [facility] OR 2ND FLR;  Service: Cardiovascular;  Laterality: N/A;    INSERTION OF GRAFT TO PERICARDIUM Right 6/30/2022    Procedure: INSERTION-RIGHT VENTRICULAR ASSIST DEVICE;  Surgeon: Luis F Paige MD;  Location: [facility] OR [facility] FLR;  Service: Cardiovascular;  Laterality: Right;    IRRIGATION OF MEDIASTINUM  6/30/2022    Procedure: IRRIGATION, MEDIASTINUM;  Surgeon: Luis F Paige MD;  Location: [facility] OR [facility] FLR;  Service: Cardiovascular;;    LEFT VENTRICULAR ASSIST DEVICE Left 6/23/2022    Procedure: INSERTION-LEFT VENTRICULAR ASSIST DEVICE;  Surgeon: Luis F Paige MD;  Location: [facility] OR 2ND FLR;  Service: Cardiovascular;  Laterality: Left;    LEFT VENTRICULAR ASSIST DEVICE N/A 6/29/2022    Procedure: INSERTION-LEFT VENTRICULAR ASSIST DEVICE;  Surgeon: Luis F Paige MD;  Location: [facility] OR [facility] FLR;  Service: Cardiovascular;  Laterality: N/A;    REVISION OF SKIN POCKET FOR CARDIOVERTER-DEFIBRILLATOR  11/9/2023    Procedure: REVISION, SKIN POCKET, FOR CARDIOVERTER-DEFIBRILLATOR;  Surgeon: ADALID Leon MD;  Location: [facility] EP LAB;  Service: Cardiology;;    RIGHT HEART CATHETERIZATION Right 4/8/2022    Procedure: INSERTION, CATHETER, RIGHT HEART;  Surgeon: Luca Lopez Jr., MD;  Location: [facility] CATH LAB;  Service: Cardiology;  Laterality: Right;    RIGHT HEART CATHETERIZATION Right 4/19/2022    Procedure: INSERTION, CATHETER, RIGHT HEART;  Surgeon: Josh Pulido MD;  Location: [facility] CATH LAB;  Service: Cardiology;  Laterality: Right;    RIGHT HEART CATHETERIZATION Right 7/21/2022    Procedure: INSERTION, CATHETER, RIGHT HEART;  Surgeon: Dalia Crum MD;  Location: [facility] CATH LAB;  Service: Cardiology;  Laterality: Right;    RIGHT HEART CATHETERIZATION  Right 10/31/2022    Procedure: INSERTION, CATHETER, RIGHT HEART;  Surgeon: Dalia Crum MD;  Location: [facility] CATH LAB;  Service: Cardiology;  Laterality: Right;    STERNAL WOUND CLOSURE N/A 6/30/2022    Procedure: CLOSURE, WOUND, STERNUM;  Surgeon: Luis F Paige MD;  Location: [facility] OR [facility] FLR;  Service: Cardiovascular;  Laterality: N/A;       Review of Systems:   As documented in primary team H&P    Home Meds:   Prior to Admission medications    Medication Sig Start Date End Date Taking? Authorizing Provider   acetaminophen (TYLENOL) 325 MG tablet Take 2 tablets (650 mg total) by mouth every 6 (six) hours as needed for Pain. 9/11/23   Marlo Marques MD   amiodarone (PACERONE) 200 MG Tab Take 1 tablet (200 mg total) by mouth once daily. 9/17/24 9/17/25  Natalya Villatoro MD   amLODIPine (NORVASC) 10 MG tablet Take 1 tablet (10 mg total) by mouth once daily. 1/9/25 1/9/26  Dalia Crum MD   aspirin (ECOTRIN) 81 MG EC tablet Take 1 tablet (81 mg total) by mouth once daily. 8/9/23   Josh Ryan MD   atorvastatin (LIPITOR) 40 MG tablet Take 1 tablet (40 mg total) by mouth once daily. 9/17/24 9/17/25  Natalya Villatoro MD   blood sugar diagnostic Strp Use to test blood glucose 4 (four) times daily. 1/9/24   Eron Lees MD   blood-glucose meter (TRUE METRIX GLUCOSE METER) Misc Use to test blood glucose 4 (four) times daily. 11/15/23      blood-glucose sensor (FREESTYLE LUCIUS 3 SENSOR) Dee 1 Device by Misc.(Non-Drug; Combo Route) route every 14 (fourteen) days. 4/26/24 4/26/25  Susanna Doty PA-C   cefadroxil (DURICEF) 500 MG Cap Take 1 capsule (500 mg total) by mouth every 12 (twelve) hours. 10/10/24 10/10/25  Laura Baldwin MD   DULoxetine (CYMBALTA) 30 MG capsule Take 1 capsule (30 mg total) by mouth once daily. 9/17/24 9/17/25  Natalya Villatoro MD   empagliflozin (JARDIANCE) 10 mg tablet Take 1 tablet (10 mg total) by mouth once daily. 1/9/25   Dalia Crum MD   eplerenone (INSPRA)  "25 MG Tab Take 2 tablets (50 mg total) by mouth once daily. 11/21/24 11/21/25  Josh Ryan MD   gabapentin (NEURONTIN) 100 MG capsule Take 1 capsule (100 mg total) by mouth 2 (two) times daily AND 4 capsules (400 mg total) every evening. 9/17/24 9/17/25  Natalya Villatoro MD   glucagon (BAQSIMI) 3 mg/actuation Spry 1 each by Nasal route as needed (hypoglycemia). 4/26/24   Susanna Doty PA-C   insulin aspart U-100 (NOVOLOG) 100 unit/mL (3 mL) InPn pen Inject 18 Units into the skin 3 (three) times daily with meals: MAX 94 units/daily  150-200    201-250    251-300    301-350    >350  +2 units   +4 units    +6 units    +8 units    +10 units 1/8/25   Dalia Crum MD   insulin detemir U-100, Levemir, 100 unit/mL (3 mL) SubQ InPn pen Inject 10 Units into the skin 2 (two) times daily. In the morning and before bed. 1/8/25 4/8/25  Dalia Crum MD   lancets 33 gauge Misc Use to test blood glucose 4 (four) times daily. 11/15/23   Dalia Crum MD   levETIRAcetam (KEPPRA) 1000 MG tablet Take 1.5 tablets (1,500 mg total) by mouth 2 (two) times daily. 9/17/24   Natalya Villatoro MD   magnesium oxide (MAG-OX) 400 mg (241.3 mg magnesium) tablet Take 1 tablet (400 mg total) by mouth once daily. 9/17/24   Natalya Villatoro MD   ondansetron (ZOFRAN-ODT) 4 MG TbDL Take 1 tablet (4 mg total) by mouth every 8 (eight) hours as needed (nausea). 2/4/24   Prasad Banda MD   pantoprazole (PROTONIX) 40 MG tablet Take 1 tablet (40 mg total) by mouth once daily. 9/17/24 9/17/25  Natalya Villatoro MD   pen needle, diabetic 32 gauge x 5/32" Ndle 1 each by Misc.(Non-Drug; Combo Route) route 4 (four) times daily. 4/26/24   Susanna Doty PA-C   polyethylene glycol (GLYCOLAX) 17 gram PwPk Take 17 g by mouth once daily. 9/30/24   Jeff Spencer PA-C   potassium chloride SA (K-DUR,KLOR-CON M) 10 MEQ tablet Take 3 tablets (30 mEq total) by mouth once daily. 9/17/24   Natalya Villatoro MD   senna-docusate 8.6-50 mg (PERICOLACE) " 8.6-50 mg per tablet Take 1 tablet by mouth daily as needed for Constipation. 9/29/24   Jeff Spencer PA-C   torsemide (DEMADEX) 20 MG Tab Take 3 tablets (60 mg total) by mouth 2 (two) times a day. 11/21/24 11/21/25  Josh Ryan MD   warfarin (COUMADIN) 3 MG tablet Take 0.5 tablets (1.5 mg total) by mouth Daily. 9/17/24   Natalya Villatoro MD   digoxin (LANOXIN) 125 mcg tablet Take 1 tablet (0.125 mg total) by mouth once daily. 4/26/22 5/27/22  Jeff Spencer PA-C   EScitalopram oxalate (LEXAPRO) 5 MG Tab Take 1 tablet (5 mg total) by mouth once daily. 5/27/22 7/27/22  Luca Lopez Jr., MD   insulin (LANTUS SOLOSTAR U-100 INSULIN) glargine 100 units/mL (3mL) SubQ pen Inject 10 Units into the skin every evening.  Patient not taking: Reported on 5/24/2022 5/20/22 5/27/22  Bautista Lynch III, MD   metOLazone (ZAROXOLYN) 2.5 MG tablet Take 2.5 mg by mouth every other day. 11/25/21 4/25/22  Provider, Historical   metoprolol succinate (TOPROL-XL) 25 MG 24 hr tablet TAKE 1 TABLET(25 MG) BY MOUTH EVERY DAY 5/19/21 4/25/22  Luca Lopez Jr., MD     Scheduled Meds:    amiodarone  200 mg Oral Daily    amLODIPine  10 mg Oral Daily    aspirin  81 mg Oral Daily    atorvastatin  40 mg Oral Daily    ceFAZolin (Ancef) 6 g in D5W 500 mL CONTINUOUS INFUSION  6 g Intravenous Q24H    DULoxetine  30 mg Oral Daily    empagliflozin  10 mg Oral Daily    eplerenone  50 mg Oral Daily    gabapentin  100 mg Oral BID    And    gabapentin  400 mg Oral QHS    insulin aspart U-100  10 Units Subcutaneous TID WM    insulin glargine U-100  10 Units Subcutaneous Daily    levETIRAcetam  1,500 mg Oral BID    magnesium oxide  400 mg Oral BID    methocarbamoL  500 mg Oral QID    mupirocin   Nasal BID    pantoprazole  40 mg Oral Daily    potassium chloride  20 mEq Oral BID    potassium chloride  60 mEq Oral Once    warfarin  1.5 mg Oral Daily     Continuous Infusions:   PRN Meds:  Current Facility-Administered Medications:      "acetaminophen, 650 mg, Oral, Q6H PRN    dextrose 50%, 12.5 g, Intravenous, PRN    dextrose 50%, 25 g, Intravenous, PRN    glucagon (human recombinant), 1 mg, Intramuscular, PRN    glucose, 16 g, Oral, PRN    glucose, 24 g, Oral, PRN    insulin aspart U-100, 10 Units, Subcutaneous, TID WM **AND** insulin aspart U-100, 0-10 Units, Subcutaneous, PRN    ondansetron, 4 mg, Oral, Q8H PRN    senna-docusate 8.6-50 mg, 1 tablet, Oral, Daily PRN  Anticoagulants/Antiplatelets: no anticoagulation    Allergies:   Review of patient's allergies indicates:   Allergen Reactions    Bumex [bumetanide] Hives     Sedation Hx: have not been any systemic reactions    Labs:  Recent Labs   Lab 01/18/25 0348   INR 2.2*       Recent Labs   Lab 01/18/25  0348 01/18/25  1123   WBC 9.40 9.31   HGB 9.1*  --    HCT 30.7*  --    MCV 61*  --      --       Recent Labs   Lab 01/17/25  0512 01/17/25  1315 01/18/25  0348 01/18/25  1122 01/18/25  1343   *   < > 114*  --  106   *   < > 129*  --  131*   K 3.0*   < > 3.5  --  3.3*   CL 88*   < > 89*  --  89*   CO2 30*   < > 28  --  30*   BUN 23*   < > 31*  --  30*   CREATININE 1.7*   < > 1.7*  --  1.6*   CALCIUM 9.6   < > 9.3  --  9.5   MG 2.2   < > 2.2  --   --    ALT 17  --   --   --   --    AST 35  --   --   --   --    ALBUMIN 2.8*  --   --  2.5*  --    BILITOT 1.9*  --   --   --   --    BILIDIR 1.3*  --   --   --   --     < > = values in this interval not displayed.         Vitals:  Temp: 97.9 °F (36.6 °C) (01/18/25 1220)  Pulse: 84 (01/18/25 1426)  Resp: 18 (01/18/25 1220)  BP: (!) 78/0 (01/18/25 1122)  SpO2: (!) 91 % (01/18/25 1220)     Physical Exam:  ASA: 2  Mallampati: 2    General: no acute distress  Mental Status: alert and oriented to person, place and time  HEENT: normocephalic, atraumatic  Chest: unlabored breathing  Heart: regular heart rate  Abdomen: nondistended  Extremity: moves all extremities    Plan: paracentesis  Sedation Plan: local    Eduardo "Jose" Tramel, " MD  Interventional Radiology

## 2025-01-18 NOTE — PROGRESS NOTES
"LVAD dressing change completed using sterile technique with daily kit. DLES is a "2" with small tan drainage noted on the drain sponge. Tolerated without any complication.No redness, or tenderness noted. Next dressing change due 01/19/2025.         01/18/25 1553        VAD 06/29/22 1115 Left ventricular assist device HeartMate 3   Placement Date/Time: 06/29/22 1115   Present Prior to Hospital Arrival?: No  Inserted by: MD  VAD Type: Left ventricular assist device  VAD Brand: HeartMate 3   Site Location Abdomen right   Site Assessment Dry;Intact;Draining   Driveline Exit Site 2   Driveline Assessment Free of Kinks;Intact;Modular cable Clean and Locked   Dressing Status Clean;Intact;Dry   Dressing Intervention (S)  Sterile dressing change   Performed By RN   Dressing Change Schedule Daily   Dressing Change Due 01/19/25   Integrity dry;intact;no damage   Driveline Parrish in use [place] askew   Condition CDI   Date changed 01/17/25       "

## 2025-01-18 NOTE — ASSESSMENT & PLAN NOTE
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 Blood cultures with GPCs, PCR with MSSA. Repeat blood cultures no growth to date. Has new abdominal distention and ascites with pain over DLES. CT A/P without abscess, but does have ascites. No signs of phlebitis from recent admission. He affirms he has been taking his suppressive cefadroxil with no missed doses, however he has a history of non-adherence. Has a headache and some blurry vision. No neck pain or joint pain. No indwelling hardware. He denies injection drug use.     Recommendations  - continue cefazolin 2 grams q8 hours  - repeat blood cultures ordered  - would consult ophthalmology for eye exam given new onset blurry vision  - obtain TTE and likely GUILLERMO   - would consider paracentesis given new ascites and abdominal pain   - unlikely to be an OPAT candidate

## 2025-01-18 NOTE — PLAN OF CARE
Plan of care discussed with patient.  Patient ambulating independently, fall precautions in place. LVAD DP and numbers WNL, smooth LVAD hum. Patient has no complaints of pain. Discussed medications and care. Potassium replaced this shift. IV abx infusing per orders. Patient has no questions at this time.       Problem: Adult Inpatient Plan of Care  Goal: Absence of Hospital-Acquired Illness or Injury  Outcome: Progressing  Intervention: Identify and Manage Fall Risk  Flowsheets (Taken 1/18/2025 1618)  Safety Promotion/Fall Prevention:   assistive device/personal item within reach   commode/urinal/bedpan at bedside   Fall Risk reviewed with patient/family   Fall Risk signage in place   instructed to call staff for mobility   medications reviewed   nonskid shoes/socks when out of bed   patient expresses understanding of fall risk and prevention   room near unit station   side rails raised x 2  Intervention: Prevent Skin Injury  Flowsheets (Taken 1/18/2025 1618)  Body Position: position changed independently  Skin Protection:   incontinence pads utilized   transparent dressing maintained  Device Skin Pressure Protection:   absorbent pad utilized/changed   adhesive use limited   positioning supports utilized   pressure points protected   tubing/devices free from skin contact  Intervention: Prevent and Manage VTE (Venous Thromboembolism) Risk  Flowsheets (Taken 1/18/2025 1618)  VTE Prevention/Management:   ambulation promoted   bleeding precautions maintained   bleeding risk assessed   dorsiflexion/plantar flexion performed   ROM (active) performed  Intervention: Prevent Infection  Flowsheets (Taken 1/18/2025 1618)  Infection Prevention:   equipment surfaces disinfected   hand hygiene promoted   rest/sleep promoted   single patient room provided  Goal: Optimal Comfort and Wellbeing  Outcome: Progressing  Intervention: Provide Person-Centered Care  Flowsheets (Taken 1/18/2025 1618)  Trust Relationship/Rapport:   care  explained   choices provided   questions encouraged   questions answered     Problem: Ventricular Assist Device  Goal: Absence of Bleeding  Outcome: Progressing  Intervention: Monitor and Manage Bleeding  Flowsheets (Taken 1/18/2025 1618)  Bleeding Precautions:   blood pressure closely monitored   coagulation study results reviewed  Bleeding Management: dressing monitored  Goal: Absence of Embolism Signs and Symptoms  Outcome: Progressing  Intervention: Prevent or Manage Embolism  Flowsheets (Taken 1/18/2025 1618)  VTE Prevention/Management:   ambulation promoted   bleeding precautions maintained   bleeding risk assessed   dorsiflexion/plantar flexion performed   ROM (active) performed     Problem: Fall Injury Risk  Goal: Absence of Fall and Fall-Related Injury  Outcome: Progressing  Intervention: Identify and Manage Contributors  Flowsheets (Taken 1/18/2025 1618)  Self-Care Promotion:   independence encouraged   BADL personal objects within reach   adaptive equipment use encouraged  Medication Review/Management: medications reviewed     Problem: Wound  Goal: Improved Oral Intake  Outcome: Progressing  Intervention: Promote and Optimize Oral Intake  Flowsheets (Taken 1/18/2025 1618)  Oral Nutrition Promotion:   adaptive equipment use encouraged   physical activity promoted   rest periods promoted   social interaction promoted  Nutrition Interventions: food preferences provided  Goal: Skin Health and Integrity  Outcome: Progressing  Intervention: Optimize Skin Protection  Flowsheets (Taken 1/18/2025 1618)  Pressure Reduction Techniques: frequent weight shift encouraged  Pressure Reduction Devices: positioning supports utilized  Skin Protection:   incontinence pads utilized   transparent dressing maintained  Activity Management:   Ambulated -L4   Sitting at edge of bed - L2   Rolling - L1  Head of Bed (HOB) Positioning: HOB elevated

## 2025-01-18 NOTE — PROGRESS NOTES
[facility] - Cardiac Intensive Care  Heart Transplant  Progress Note    Patient Name: Kevan Queen  MRN: [id number]  Admission Date: 1/15/2025  Hospital Length of Stay: 3 days  Attending Physician: Josh Ryan, *  Primary Care Provider: Rosio Armendariz FNP  Principal Problem:Acute on chronic systolic CHF (congestive heart failure)    Subjective:   Interval History: NAOEN. Blood cultures growing staph. ID following and changed abx from Vanc and Zosyn to Ancef.  They are recommending TTE and Ophthalmology consult (both orders placed).  Lasix gtt stopped yesterday due to increase Cr and improvement in appearance of overall volume status, though JVD still pressent.  His creatinine is up to 1.7 today from 1.2 yesterday and 1.3 the day before. F/up BMP this afternoon, if worsening off diuresis, will consider restarting po diuretic and f/up response with renal function.   (His previous home dose of diuretics was Torsemide 60mg BID).     Continuous Infusions:      Scheduled Meds:   amiodarone  200 mg Oral Daily    amLODIPine  10 mg Oral Daily    aspirin  81 mg Oral Daily    atorvastatin  40 mg Oral Daily    ceFAZolin (Ancef) 6 g in D5W 500 mL CONTINUOUS INFUSION  6 g Intravenous Q24H    DULoxetine  30 mg Oral Daily    empagliflozin  10 mg Oral Daily    eplerenone  50 mg Oral Daily    gabapentin  100 mg Oral BID    And    gabapentin  400 mg Oral QHS    insulin aspart U-100  10 Units Subcutaneous TID WM    insulin glargine U-100  10 Units Subcutaneous Daily    levETIRAcetam  1,500 mg Oral BID    magnesium oxide  400 mg Oral BID    methocarbamoL  500 mg Oral QID    mupirocin   Nasal BID    pantoprazole  40 mg Oral Daily    potassium chloride  20 mEq Oral BID    warfarin  1.5 mg Oral Daily     PRN Meds:  Current Facility-Administered Medications:     acetaminophen, 650 mg, Oral, Q6H PRN    dextrose 50%, 12.5 g, Intravenous, PRN    dextrose 50%, 25 g, Intravenous, PRN    glucagon (human recombinant), 1 mg,  Intramuscular, PRN    glucose, 16 g, Oral, PRN    glucose, 24 g, Oral, PRN    insulin aspart U-100, 10 Units, Subcutaneous, TID WM **AND** insulin aspart U-100, 0-10 Units, Subcutaneous, PRN    ondansetron, 4 mg, Oral, Q8H PRN    senna-docusate 8.6-50 mg, 1 tablet, Oral, Daily PRN    Review of patient's allergies indicates:   Allergen Reactions    Bumex [bumetanide] Hives     Objective:     Vital Signs (Most Recent):  Temp: 98.3 °F (36.8 °C) (01/18/25 0730)  Pulse: 87 (01/18/25 1054)  Resp: 19 (01/18/25 0730)  BP: (!) 82/0 (01/18/25 0719)  SpO2: 99 % (01/18/25 0730) Vital Signs (24h Range):  Temp:  [97.8 °F (36.6 °C)-98.3 °F (36.8 °C)] 98.3 °F (36.8 °C)  Pulse:  [79-93] 87  Resp:  [18-19] 19  SpO2:  [96 %-99 %] 99 %  BP: (80-98)/(0-73) 82/0     Patient Vitals for the past 72 hrs (Last 3 readings):   Weight   01/17/25 0746 62.7 kg (138 lb 3.7 oz)   01/17/25 0505 66.2 kg (145 lb 15.1 oz)   01/16/25 0815 68.1 kg (150 lb 2.1 oz)     Body mass index is 17.28 kg/m².      Intake/Output Summary (Last 24 hours) at 1/18/2025 1059  Last data filed at 1/18/2025 1020  Gross per 24 hour   Intake 2243.61 ml   Output 2125 ml   Net 118.61 ml       Telemetry: NSR 90s       Physical Exam  Vitals and nursing note reviewed.   Constitutional:       Appearance: He is well-developed.   HENT:      Head: Normocephalic.   Eyes:      Pupils: Pupils are equal, round, and reactive to light.   Cardiovascular:      Rate and Rhythm: Normal rate and regular rhythm.      Heart sounds: Murmur heard.      Comments: VAD hum, elevated JVD  Pulmonary:      Effort: Pulmonary effort is normal.      Breath sounds: Normal breath sounds.   Abdominal:      General: Bowel sounds are normal. There is distension.      Palpations: Abdomen is soft.   Musculoskeletal:         General: Normal range of motion.      Cervical back: Normal range of motion and neck supple.      Right lower leg: No edema.      Left lower leg: No edema.   Skin:     General: Skin is warm and  dry.   Neurological:      Mental Status: He is alert and oriented to person, place, and time.   Psychiatric:         Behavior: Behavior normal.            Significant Labs:  CBC:  Recent Labs   Lab 01/16/25 0421 01/17/25 0512 01/18/25 0348   WBC 10.98 10.81 9.40   RBC 4.58* 5.35 5.05   HGB 8.3* 9.6* 9.1*   HCT 28.4* 32.4* 30.7*    272 262   MCV 62* 61* 61*   MCH 18.1* 17.9* 18.0*   MCHC 29.2* 29.6* 29.6*     BNP:  Recent Labs   Lab 01/14/25  0250 01/15/25  0436 01/17/25  0512   BNP 1,009.6* 1,157* 717*     CMP:  Recent Labs   Lab 01/15/25  0436 01/15/25  0740 01/16/25 0421 01/17/25 0512 01/17/25  1315 01/18/25 0348   GLU  --  182*   < > 129* 109 114*   CALCIUM  --  8.3*   < > 9.6 9.7 9.3   ALBUMIN 2.4* 2.2*  --  2.8*  --   --    PROT 8.6* 7.9  --  10.4*  --   --    NA  --  132*   < > 132* 131* 129*   K  --  3.4*   < > 3.0* 3.2* 3.5   CO2  --  25   < > 30* 33* 28   CL  --  100   < > 88* 86* 89*   BUN  --  19   < > 23* 27* 31*   CREATININE  --  1.3   < > 1.7* 1.7* 1.7*   ALKPHOS 150 141  --  193*  --   --    ALT 14 14  --  17  --   --    AST 35 31  --  35  --   --    BILITOT 2.4* 2.3*  --  1.9*  --   --     < > = values in this interval not displayed.      Coagulation:   Recent Labs   Lab 01/16/25 0421 01/17/25 0512 01/18/25 0348   INR 2.0* 2.1* 2.2*   APTT 33.0* 31.9 33.1*     LDH:  Recent Labs   Lab 01/16/25 0421 01/17/25  0512 01/18/25  0348    331* 282*     Microbiology:  Microbiology Results (last 7 days)       Procedure Component Value Units Date/Time    Gram stain [[id number]]     Order Status: No result Specimen: Body Fluid     Anaerobic culture [[id number]]     Order Status: No result Specimen: Body Fluid     Aerobic culture [[id number]]     Order Status: No result Specimen: Peritoneal Fluid     Blood culture [[id number]] Collected: 01/16/25 1006    Order Status: Completed Specimen: Blood Updated: 01/17/25 1212     Blood Culture, Routine No Growth to date      No Growth to date     Blood culture [[id number]] Collected: 01/16/25 1006    Order Status: Completed Specimen: Blood Updated: 01/17/25 1212     Blood Culture, Routine No Growth to date      No Growth to date    MRSA Screen by PCR [[id number]] Collected: 01/15/25 1630    Order Status: Completed Specimen: Nasal Swab Updated: 01/15/25 1852     MRSA SCREEN BY PCR Not Detected    Culture, Anaerobe [[id number]]     Order Status: Canceled Specimen: Body Fluid from Abdomen     Aerobic culture [[id number]]     Order Status: Canceled Specimen: Incision site             I have reviewed all pertinent labs within the past 24 hours.    Estimated Creatinine Clearance: 51.7 mL/min (A) (based on SCr of 1.7 mg/dL (H)).    Diagnostic Results:  I have reviewed all pertinent imaging results/findings within the past 24 hours.  Assessment and Plan:     No notes on file    * Acute on chronic systolic CHF (congestive heart failure)  -NICM  -Last 2D Echo  8/26/24 : LVEF 5-10%, LVEDD  7.2 cm  -Diuresed with Lasix gtt and was given Metolazone 1/16.  Lasix gtt decreased to 10mg/hr due to hypokalemia and leg cramping.  Looks more euvolemic today.  Will stop Lasix gtt and consider starting po diuretics tomorrow.  (Previous home diuretics dose was Torsemide 60mg BID)  -GDMT with Eplerenone  -2g Na dietary restriction, 1500 mL fluid restriction, strict I/Os      LVAD (left ventricular assist device) present  HeartMate 3 Implanted 6/23/2022 with early RV failure requiring RVAD with ProTek Duo   -Continue Coumadin,  Goal INR 2.0-3.0 . Therapeutic today.  Previous home regimen 1.5mg Qdaily  -Antiplatelets ASA 81 mg, on hold with GIB  -LDH is stable overall today. Will continue to monitor daily.  -Speed set at 5100  -Interrogation notable for no events  -Not listed for OHTx    Procedure: Device Interrogation Including analysis of device parameters  Current Settings: Ventricular Assist Device        1/18/2025     7:19 AM 1/18/2025     4:03 AM 1/17/2025    11:26 PM 1/17/2025      8:05 PM 1/17/2025     4:00 PM 1/17/2025    12:00 PM 1/17/2025     7:48 AM   TXP LVAD INTERROGATIONS   Type HeartMate3 HeartMate3 HeartMate3 HeartMate3 HeartMate3 HeartMate3 HeartMate3   Flow 3.7 3.9 4.1 3.7 4.4 3.8 4.4   Speed 5150 5000 5000 5000 5100 5100 5100   PI 6.6 5.8 5.1 6.7 5 6.4 5   Power (Serna) 3.6 3.6 3.8 3.6 3.6 3.5 3.6   LSL 4700 4600 4600 4600 4700 4700 4700   Pulsatility No Pulse Intermittent pulse Intermittent pulse No Pulse Intermittent pulse Intermittent pulse Intermittent pulse         Ascites  -Will monitor with diuresis     HTN (hypertension)  -On home dose of Amlodipine and Eplerenone    Chronic anticoagulation  -See above VAD    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   -Repeat Blood Cx 1/16 negative thus far  -Started on Vanc/Zosyn.  -ID consult.   -Vanc and Zosyn changed to Ancef  -TTE and ophthalmology consult ordered per ID recommendations   -diagnostic paracentesis orders added.     Right heart failure secondary to left heart failure-post LVAD surgery  -Weaned off inotropes 8/2024  -See above heart failure     Temporal lobe seizure  -On home dose of Keppra         Alice Ayala MD  Heart Transplant  [facility] - Cardiac [facility]

## 2025-01-18 NOTE — PROCEDURES
"  Pre Op Diagnosis: ascites  Post Op Diagnosis: Same    Procedure: paracentesis    Procedure performed by: Wong    Written Informed Consent Obtained: Yes  Specimen Removed: YES see imaging for total  Estimated Blood Loss: Minimal    Findings:   Successful paracentesis.    Patient tolerated procedure well.    Eduardo Back MD ([facility])  Interventional Radiology  [phone]      "

## 2025-01-18 NOTE — CONSULTS
Consultation Report  Ophthalmology Service    Date: 01/18/2025    Chief complaint/Reason for Consult: ID recommending for blurry vision in patient with bacteremia      History of Present Illness: Kevan Queen is a 39 y.o. male stage D CHF due to NICM, polysubstance abuse, tobacco use, DM, admitted for sepsis. Patient found to have MSSA bacteremia and on IV antibiotics. Ophthalmology was consulted for blurry vision.    Patient states 2 weeks of blurry vision in both eyes, constant, hasn't gotten better or worst. Denies pain, but endorses burning and itching in both eyes. Also, experiencing glare at night. Seeing about 3-5 floaters in each eye but chronic. No new flashes or floaters.    POcularHx: No history of ocular problems or past ocular surgeries.  Does not use glasses or contacts.    Current eye gtts: none      PMHx:  has a past medical history of Acute respiratory failure with hypoxia (07/22/2023), Arthritis, Awareness alteration, transient (09/01/2022), Cardiomyopathy, CHF (congestive heart failure) (10/01/2020), COVID-19 (06/03/2023), Diabetes mellitus, Dilated cardiomyopathy (10/26/2020), Drug abuse (10/2020), Headache (04/19/2023), Hyperglycemia (12/16/2022), Hyperosmolar hyperglycemic state (HHS) (05/25/2022), ICD (implantable cardioverter-defibrillator) in place (10/26/2020), Infected defibrillator now s/p removal Nov 2023 (07/23/2023), Left ventricular assist device (LVAD) complication, initial encounter (06/05/2023), Muscle cramping (06/15/2022), Renal disorder, SOB (shortness of breath) (06/13/2022), and Tingling in extremities (07/13/2022).     PSurgHx:  has a past surgical history that includes Cardiac defibrillator placement; Right heart catheterization (Right, 4/8/2022); Right heart catheterization (Right, 4/19/2022); Left ventricular assist device (Left, 6/23/2022); Left ventricular assist device (N/A, 6/29/2022); Implantation of right ventricular assist device (RVAD) (N/A, 6/29/2022);  Sternal wound closure (N/A, 6/30/2022); Insertion of graft to pericardium (Right, 6/30/2022); Application of wound vacuum-assisted closure device (N/A, 6/30/2022); Irrigation of mediastinum (6/30/2022); Right heart catheterization (Right, 7/21/2022); Right heart catheterization (Right, 10/31/2022); extraction, electrode lead (Left, 11/9/2023); Revision of skin pocket for cardioverter-defibrillator (11/9/2023); echocardiogram,transesophageal (11/9/2023); Colonoscopy (N/A, 9/25/2024); and Esophagogastroduodenoscopy (N/A, 9/25/2024).     Home Medications:   Prior to Admission medications    Medication Sig Start Date End Date Taking? Authorizing Provider   acetaminophen (TYLENOL) 325 MG tablet Take 2 tablets (650 mg total) by mouth every 6 (six) hours as needed for Pain. 9/11/23   Marlo Marques MD   amiodarone (PACERONE) 200 MG Tab Take 1 tablet (200 mg total) by mouth once daily. 9/17/24 9/17/25  Natalya Villatoro MD   amLODIPine (NORVASC) 10 MG tablet Take 1 tablet (10 mg total) by mouth once daily. 1/9/25 1/9/26  Dalia Crum MD   aspirin (ECOTRIN) 81 MG EC tablet Take 1 tablet (81 mg total) by mouth once daily. 8/9/23   Josh Ryan MD   atorvastatin (LIPITOR) 40 MG tablet Take 1 tablet (40 mg total) by mouth once daily. 9/17/24 9/17/25  Natalya Villatoro MD   blood sugar diagnostic Strp Use to test blood glucose 4 (four) times daily. 1/9/24   Eron Lees MD   blood-glucose meter (TRUE METRIX GLUCOSE METER) Misc Use to test blood glucose 4 (four) times daily. 11/15/23      blood-glucose sensor (FREESTYLE LUCIUS 3 SENSOR) Dee 1 Device by Misc.(Non-Drug; Combo Route) route every 14 (fourteen) days. 4/26/24 4/26/25  Susanna Doty PA-C   cefadroxil (DURICEF) 500 MG Cap Take 1 capsule (500 mg total) by mouth every 12 (twelve) hours. 10/10/24 10/10/25  Laura Baldwin MD   DULoxetine (CYMBALTA) 30 MG capsule Take 1 capsule (30 mg total) by mouth once daily. 9/17/24 9/17/25  Natalya Villatoro,  "MD   empagliflozin (JARDIANCE) 10 mg tablet Take 1 tablet (10 mg total) by mouth once daily. 1/9/25   Dalia Crum MD   eplerenone (INSPRA) 25 MG Tab Take 2 tablets (50 mg total) by mouth once daily. 11/21/24 11/21/25  Josh Ryan MD   gabapentin (NEURONTIN) 100 MG capsule Take 1 capsule (100 mg total) by mouth 2 (two) times daily AND 4 capsules (400 mg total) every evening. 9/17/24 9/17/25  Natalya Villatoro MD   glucagon (BAQSIMI) 3 mg/actuation Spry 1 each by Nasal route as needed (hypoglycemia). 4/26/24   Susanna Doty PA-C   insulin aspart U-100 (NOVOLOG) 100 unit/mL (3 mL) InPn pen Inject 18 Units into the skin 3 (three) times daily with meals: MAX 94 units/daily  150-200    201-250    251-300    301-350    >350  +2 units   +4 units    +6 units    +8 units    +10 units 1/8/25   Dalia Crum MD   insulin detemir U-100, Levemir, 100 unit/mL (3 mL) SubQ InPn pen Inject 10 Units into the skin 2 (two) times daily. In the morning and before bed. 1/8/25 4/8/25  Dalia Crum MD   lancets 33 gauge Misc Use to test blood glucose 4 (four) times daily. 11/15/23   Dalia Crum MD   levETIRAcetam (KEPPRA) 1000 MG tablet Take 1.5 tablets (1,500 mg total) by mouth 2 (two) times daily. 9/17/24   Natalya Villatoro MD   magnesium oxide (MAG-OX) 400 mg (241.3 mg magnesium) tablet Take 1 tablet (400 mg total) by mouth once daily. 9/17/24   Natalya Villatoro MD   ondansetron (ZOFRAN-ODT) 4 MG TbDL Take 1 tablet (4 mg total) by mouth every 8 (eight) hours as needed (nausea). 2/4/24   Prasad Banda MD   pantoprazole (PROTONIX) 40 MG tablet Take 1 tablet (40 mg total) by mouth once daily. 9/17/24 9/17/25  Natalya Villatoro MD   pen needle, diabetic 32 gauge x 5/32" Ndle 1 each by Misc.(Non-Drug; Combo Route) route 4 (four) times daily. 4/26/24   Susanna Doty PA-C   polyethylene glycol (GLYCOLAX) 17 gram PwPk Take 17 g by mouth once daily. 9/30/24   Jeff Spencer PA-C   potassium chloride SA " (K-DUR,KLOR-CON M) 10 MEQ tablet Take 3 tablets (30 mEq total) by mouth once daily. 9/17/24   Natalya Villatoro MD   senna-docusate 8.6-50 mg (PERICOLACE) 8.6-50 mg per tablet Take 1 tablet by mouth daily as needed for Constipation. 9/29/24   Jeff Spencer PA-C   torsemide (DEMADEX) 20 MG Tab Take 3 tablets (60 mg total) by mouth 2 (two) times a day. 11/21/24 11/21/25  Josh Ryan MD   warfarin (COUMADIN) 3 MG tablet Take 0.5 tablets (1.5 mg total) by mouth Daily. 9/17/24   Natalya Villatoro MD   digoxin (LANOXIN) 125 mcg tablet Take 1 tablet (0.125 mg total) by mouth once daily. 4/26/22 5/27/22  Jeff Spencer PA-C   EScitalopram oxalate (LEXAPRO) 5 MG Tab Take 1 tablet (5 mg total) by mouth once daily. 5/27/22 7/27/22  Luca Lopez Jr., MD   insulin (LANTUS SOLOSTAR U-100 INSULIN) glargine 100 units/mL (3mL) SubQ pen Inject 10 Units into the skin every evening.  Patient not taking: Reported on 5/24/2022 5/20/22 5/27/22  Bautista Lynch III, MD   metOLazone (ZAROXOLYN) 2.5 MG tablet Take 2.5 mg by mouth every other day. 11/25/21 4/25/22  Provider, Historical   metoprolol succinate (TOPROL-XL) 25 MG 24 hr tablet TAKE 1 TABLET(25 MG) BY MOUTH EVERY DAY 5/19/21 4/25/22  Luca Lopez Jr., MD        Medications this encounter:    amiodarone  200 mg Oral Daily    amLODIPine  10 mg Oral Daily    aspirin  81 mg Oral Daily    atorvastatin  40 mg Oral Daily    ceFAZolin (Ancef) 6 g in D5W 500 mL CONTINUOUS INFUSION  6 g Intravenous Q24H    DULoxetine  30 mg Oral Daily    empagliflozin  10 mg Oral Daily    eplerenone  50 mg Oral Daily    gabapentin  100 mg Oral BID    And    gabapentin  400 mg Oral QHS    insulin aspart U-100  10 Units Subcutaneous TID WM    insulin glargine U-100  10 Units Subcutaneous Daily    levETIRAcetam  1,500 mg Oral BID    magnesium oxide  400 mg Oral BID    methocarbamoL  500 mg Oral QID    mupirocin   Nasal BID    pantoprazole  40 mg Oral Daily    potassium chloride  20 mEq  Oral BID    warfarin  1.5 mg Oral Daily       Allergies: is allergic to bumex [bumetanide].     Social:  reports that he quit smoking about 3 years ago. His smoking use included cigarettes. He started smoking about 20 years ago. He has a 8.3 pack-year smoking history. He has never used smokeless tobacco. He reports that he does not currently use alcohol. He reports that he does not currently use drugs after having used the following drugs: Marijuana and MDMA (Ecstacy).     Family Hx: No family history of glaucoma, macular degeneration, or blindness. family history includes Diverticulosis in his brother; Heart attack in his maternal grandfather and maternal grandmother; Heart failure in his father.     ROS: Negative x 10 except for complaints as described in HPI; negative for fever, chills, weight loss, nausea, vomiting, diarrhea, shortness of breath, nasal discharge, cough, abdominal pain, dyspnea, difficulty moving arms and legs, confusion, dysuria, palpitations, or chest pain     Ocular examination/Dilated fundus examination:  Base Eye Exam       Visual Acuity (Charmaine Card)         Right Left    Dist sc 20/40 20/30    Dist ph sc NI NI              Tonometry (Tonopen, 4:55 PM)         Right Left    Pressure 14 15              Pupils         Dark Light Shape React APD    Right 5 3 Round Brisk None    Left 5 3 Round Brisk None              Visual Fields         Right Left     Full Full              Extraocular Movement         Right Left     Full, Ortho Full, Ortho              Dilation       Both eyes: 1% Mydriacyl, 2.5% Phenylephrine @ 4:55 PM                  Slit Lamp and Fundus Exam       External Exam         Right Left    External Normal Normal              Slit Lamp Exam         Right Left    Lids/Lashes Normal Normal    Conjunctiva/Sclera White and quiet White and quiet    Cornea central PEEs tr PEEs    Anterior Chamber Deep and quiet Deep and quiet    Iris Round and reactive, no NVI Round and reactive, no  NVI    Lens Clear Clear    Anterior Vitreous Normal Normal              Fundus Exam         Right Left    Disc Normal Normal    C/D Ratio 0.4 0.3    Macula Normal Normal    Vessels Normal Normal    Periphery Normal Chorioretinal scar inferonasal 7 o'clock                      Assessment/Plan:   1. Type 2 diabetes, with insulin use, without retinoapthy, OU  - Pt consulted for 2 weeks of constant blurry vision both eyes  - Hx of prolonged, uncontrolled DM   - Last a1c 10.6 from 12/31/2024, 10.3 (8/2025), >14 (4/2024)  - VA 20/40 // 20/30 PNHI both eyes  - last seen 5 months ago with 20/40 //20/30 PH 20/20 OU  - no retinopathy on exam today  - encouraged good BG/HTN control    2. Dry eyes OD>OS  - Likely contributing to 2 week of blurry vision     3. 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  If there are further questions or new concerns, please page the on call ophthalmology resident.    Griffin Waller MD  PGY2, Ophthalmology Resident  01/18/2025  4:49 PM

## 2025-01-18 NOTE — PLAN OF CARE
Procedure completed. Patient drained 2,550 mL.  Labs collected.  Patient tolerated well; LLQ Site CDI. Report given at bedside.

## 2025-01-18 NOTE — ASSESSMENT & PLAN NOTE
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 Therapeutic today.  Previous home regimen 1.5mg Qdaily  -Antiplatelets ASA 81 mg, on hold with GIB  -LDH is stable overall today. Will continue to monitor daily.  -Speed set at 5100  -Interrogation notable for no events  -Not listed for OHTx    Procedure: Device Interrogation Including analysis of device parameters  Current Settings: Ventricular Assist Device        1/18/2025     7:19 AM 1/18/2025     4:03 AM 1/17/2025    11:26 PM 1/17/2025     8:05 PM 1/17/2025     4:00 PM 1/17/2025    12:00 PM 1/17/2025     7:48 AM   TXP LVAD INTERROGATIONS   Type HeartMate3 HeartMate3 HeartMate3 HeartMate3 HeartMate3 HeartMate3 HeartMate3   Flow 3.7 3.9 4.1 3.7 4.4 3.8 4.4   Speed 5150 5000 5000 5000 5100 5100 5100   PI 6.6 5.8 5.1 6.7 5 6.4 5   Power (Serna) 3.6 3.6 3.8 3.6 3.6 3.5 3.6   LSL 4700 4600 4600 4600 4700 4700 4700   Pulsatility No Pulse Intermittent pulse Intermittent pulse No Pulse Intermittent pulse Intermittent pulse Intermittent pulse

## 2025-01-18 NOTE — PLAN OF CARE
Pt AAOx4, VSS, & breathing unlabored. Ancef drip maintained at 20.8 ml/hr. LVAD numbers & dopplers WDL, smooth LVAD hum present. Bed is locked, in lowest position, call light/personal belongings in reach, and side rail up x2. Pt instructed to use call light for assistance when needed. Will continue plan of care.     Problem: Adult Inpatient Plan of Care  Goal: Plan of Care Review  Outcome: Progressing  Goal: Patient-Specific Goal (Individualized)  Outcome: Progressing  Goal: Absence of Hospital-Acquired Illness or Injury  Outcome: Progressing  Goal: Optimal Comfort and Wellbeing  Outcome: Progressing  Goal: Readiness for Transition of Care  Outcome: Progressing     Problem: Diabetes Comorbidity  Goal: Blood Glucose Level Within Targeted Range  Outcome: Progressing     Problem: Sepsis/Septic Shock  Goal: Optimal Coping  Outcome: Progressing  Goal: Absence of Bleeding  Outcome: Progressing  Goal: Blood Glucose Level Within Targeted Range  Outcome: Progressing  Goal: Absence of Infection Signs and Symptoms  Outcome: Progressing  Goal: Optimal Nutrition Intake  Outcome: Progressing     Problem: Ventricular Assist Device  Goal: Optimal Adjustment to Device  Outcome: Progressing  Goal: Absence of Bleeding  Outcome: Progressing  Goal: Absence of Embolism Signs and Symptoms  Outcome: Progressing  Goal: Optimal Blood Flow  Outcome: Progressing  Goal: Absence of Infection Signs and Symptoms  Outcome: Progressing  Goal: Effective Right-Sided Heart Function  Outcome: Progressing     Problem: Fall Injury Risk  Goal: Absence of Fall and Fall-Related Injury  Outcome: Progressing     Problem: Heart Failure  Goal: Optimal Coping  Outcome: Progressing  Goal: Optimal Cardiac Output  Outcome: Progressing  Goal: Stable Heart Rate and Rhythm  Outcome: Progressing  Goal: Optimal Functional Ability  Outcome: Progressing  Goal: Fluid and Electrolyte Balance  Outcome: Progressing  Goal: Improved Oral Intake  Outcome: Progressing  Goal:  Effective Oxygenation and Ventilation  Outcome: Progressing  Goal: Effective Breathing Pattern During Sleep  Outcome: Progressing

## 2025-01-18 NOTE — SUBJECTIVE & OBJECTIVE
Interval History: NAOEN. Blood cultures growing staph. ID following and changed abx from Vanc and Zosyn to Ancef.  They are recommending TTE and Ophthalmology consult (both orders placed).  Lasix gtt stopped yesterday due to increase Cr and improvement in appearance of overall volume status, though JVD still pressent.  His creatinine is up to 1.7 today from 1.2 yesterday and 1.3 the day before. F/up BMP this afternoon, if worsening off diuresis, will consider restarting po diuretic and f/up response with renal function.   (His previous home dose of diuretics was Torsemide 60mg BID).     Continuous Infusions:      Scheduled Meds:   amiodarone  200 mg Oral Daily    amLODIPine  10 mg Oral Daily    aspirin  81 mg Oral Daily    atorvastatin  40 mg Oral Daily    ceFAZolin (Ancef) 6 g in D5W 500 mL CONTINUOUS INFUSION  6 g Intravenous Q24H    DULoxetine  30 mg Oral Daily    empagliflozin  10 mg Oral Daily    eplerenone  50 mg Oral Daily    gabapentin  100 mg Oral BID    And    gabapentin  400 mg Oral QHS    insulin aspart U-100  10 Units Subcutaneous TID WM    insulin glargine U-100  10 Units Subcutaneous Daily    levETIRAcetam  1,500 mg Oral BID    magnesium oxide  400 mg Oral BID    methocarbamoL  500 mg Oral QID    mupirocin   Nasal BID    pantoprazole  40 mg Oral Daily    potassium chloride  20 mEq Oral BID    warfarin  1.5 mg Oral Daily     PRN Meds:  Current Facility-Administered Medications:     acetaminophen, 650 mg, Oral, Q6H PRN    dextrose 50%, 12.5 g, Intravenous, PRN    dextrose 50%, 25 g, Intravenous, PRN    glucagon (human recombinant), 1 mg, Intramuscular, PRN    glucose, 16 g, Oral, PRN    glucose, 24 g, Oral, PRN    insulin aspart U-100, 10 Units, Subcutaneous, TID WM **AND** insulin aspart U-100, 0-10 Units, Subcutaneous, PRN    ondansetron, 4 mg, Oral, Q8H PRN    senna-docusate 8.6-50 mg, 1 tablet, Oral, Daily PRN    Review of patient's allergies indicates:   Allergen Reactions    Bumex [bumetanide]  Hives     Objective:     Vital Signs (Most Recent):  Temp: 98.3 °F (36.8 °C) (01/18/25 0730)  Pulse: 87 (01/18/25 1054)  Resp: 19 (01/18/25 0730)  BP: (!) 82/0 (01/18/25 0719)  SpO2: 99 % (01/18/25 0730) Vital Signs (24h Range):  Temp:  [97.8 °F (36.6 °C)-98.3 °F (36.8 °C)] 98.3 °F (36.8 °C)  Pulse:  [79-93] 87  Resp:  [18-19] 19  SpO2:  [96 %-99 %] 99 %  BP: (80-98)/(0-73) 82/0     Patient Vitals for the past 72 hrs (Last 3 readings):   Weight   01/17/25 0746 62.7 kg (138 lb 3.7 oz)   01/17/25 0505 66.2 kg (145 lb 15.1 oz)   01/16/25 0815 68.1 kg (150 lb 2.1 oz)     Body mass index is 17.28 kg/m².      Intake/Output Summary (Last 24 hours) at 1/18/2025 1059  Last data filed at 1/18/2025 1020  Gross per 24 hour   Intake 2243.61 ml   Output 2125 ml   Net 118.61 ml       Telemetry: NSR 90s       Physical Exam  Vitals and nursing note reviewed.   Constitutional:       Appearance: He is well-developed.   HENT:      Head: Normocephalic.   Eyes:      Pupils: Pupils are equal, round, and reactive to light.   Cardiovascular:      Rate and Rhythm: Normal rate and regular rhythm.      Heart sounds: Murmur heard.      Comments: VAD hum, elevated JVD  Pulmonary:      Effort: Pulmonary effort is normal.      Breath sounds: Normal breath sounds.   Abdominal:      General: Bowel sounds are normal. There is distension.      Palpations: Abdomen is soft.   Musculoskeletal:         General: Normal range of motion.      Cervical back: Normal range of motion and neck supple.      Right lower leg: No edema.      Left lower leg: No edema.   Skin:     General: Skin is warm and dry.   Neurological:      Mental Status: He is alert and oriented to person, place, and time.   Psychiatric:         Behavior: Behavior normal.            Significant Labs:  CBC:  Recent Labs   Lab 01/16/25  0421 01/17/25  0512 01/18/25  0348   WBC 10.98 10.81 9.40   RBC 4.58* 5.35 5.05   HGB 8.3* 9.6* 9.1*   HCT 28.4* 32.4* 30.7*    272 262   MCV 62* 61* 61*    MCH 18.1* 17.9* 18.0*   MCHC 29.2* 29.6* 29.6*     BNP:  Recent Labs   Lab 01/14/25  0250 01/15/25  0436 01/17/25  0512   BNP 1,009.6* 1,157* 717*     CMP:  Recent Labs   Lab 01/15/25  0436 01/15/25  0740 01/16/25  0421 01/17/25  0512 01/17/25  1315 01/18/25 0348   GLU  --  182*   < > 129* 109 114*   CALCIUM  --  8.3*   < > 9.6 9.7 9.3   ALBUMIN 2.4* 2.2*  --  2.8*  --   --    PROT 8.6* 7.9  --  10.4*  --   --    NA  --  132*   < > 132* 131* 129*   K  --  3.4*   < > 3.0* 3.2* 3.5   CO2  --  25   < > 30* 33* 28   CL  --  100   < > 88* 86* 89*   BUN  --  19   < > 23* 27* 31*   CREATININE  --  1.3   < > 1.7* 1.7* 1.7*   ALKPHOS 150 141  --  193*  --   --    ALT 14 14  --  17  --   --    AST 35 31  --  35  --   --    BILITOT 2.4* 2.3*  --  1.9*  --   --     < > = values in this interval not displayed.      Coagulation:   Recent Labs   Lab 01/16/25 0421 01/17/25 0512 01/18/25 0348   INR 2.0* 2.1* 2.2*   APTT 33.0* 31.9 33.1*     LDH:  Recent Labs   Lab 01/16/25 0421 01/17/25  0512 01/18/25  0348    331* 282*     Microbiology:  Microbiology Results (last 7 days)       Procedure Component Value Units Date/Time    Gram stain [[id number]]     Order Status: No result Specimen: Body Fluid     Anaerobic culture [[id number]]     Order Status: No result Specimen: Body Fluid     Aerobic culture [[id number]]     Order Status: No result Specimen: Peritoneal Fluid     Blood culture [[id number]] Collected: 01/16/25 1006    Order Status: Completed Specimen: Blood Updated: 01/17/25 1212     Blood Culture, Routine No Growth to date      No Growth to date    Blood culture [[id number]] Collected: 01/16/25 1006    Order Status: Completed Specimen: Blood Updated: 01/17/25 1212     Blood Culture, Routine No Growth to date      No Growth to date    MRSA Screen by PCR [[id number]] Collected: 01/15/25 1630    Order Status: Completed Specimen: Nasal Swab Updated: 01/15/25 1852     MRSA SCREEN BY PCR Not Detected    Culture, Anaerobe  [[id number]]     Order Status: Canceled Specimen: Body Fluid from Abdomen     Aerobic culture [[id number]]     Order Status: Canceled Specimen: Incision site             I have reviewed all pertinent labs within the past 24 hours.    Estimated Creatinine Clearance: 51.7 mL/min (A) (based on SCr of 1.7 mg/dL (H)).    Diagnostic Results:  I have reviewed all pertinent imaging results/findings within the past 24 hours.

## 2025-01-18 NOTE — SUBJECTIVE & OBJECTIVE
Interval History: had headache overnight, slept poorly. Now resolved. Continues to have blurry vision. Abdominal pain is stable, no worse. No new joint pains.     Review of Systems   All other systems reviewed and are negative.    Objective:     Vital Signs (Most Recent):  Temp: 97.9 °F (36.6 °C) (01/18/25 1220)  Pulse: 86 (01/18/25 1220)  Resp: 18 (01/18/25 1220)  BP: (!) 78/0 (01/18/25 1122)  SpO2: (!) 91 % (01/18/25 1220) Vital Signs (24h Range):  Temp:  [97.8 °F (36.6 °C)-98.3 °F (36.8 °C)] 97.9 °F (36.6 °C)  Pulse:  [79-93] 86  Resp:  [18-19] 18  SpO2:  [91 %-99 %] 91 %  BP: (78-98)/(0-73) 78/0     Weight: 62.7 kg (138 lb 3.7 oz)  Body mass index is 17.28 kg/m².    Estimated Creatinine Clearance: 51.7 mL/min (A) (based on SCr of 1.7 mg/dL (H)).     Physical Exam  Vitals reviewed.   Constitutional:       Appearance: He is ill-appearing. He is not toxic-appearing.   HENT:      Head: Normocephalic.      Mouth/Throat:      Pharynx: No oropharyngeal exudate.   Pulmonary:      Effort: Pulmonary effort is normal. No respiratory distress.   Abdominal:      General: There is distension.      Tenderness: There is abdominal tenderness.      Comments: RUQ and tenderness over driveline   Musculoskeletal:      Cervical back: Normal range of motion. No rigidity or tenderness.   Lymphadenopathy:      Cervical: No cervical adenopathy.   Skin:     General: Skin is warm and dry.      Findings: No lesion or rash.   Neurological:      General: No focal deficit present.      Mental Status: He is alert and oriented to person, place, and time.   Psychiatric:         Mood and Affect: Mood normal.          Significant Labs:   Microbiology Results (last 7 days)       Procedure Component Value Units Date/Time    Blood culture [[id number]] Collected: 01/16/25 1006    Order Status: Completed Specimen: Blood Updated: 01/18/25 1212     Blood Culture, Routine No Growth to date      No Growth to date      No Growth to date    Blood culture  [[id number]] Collected: 01/16/25 1006    Order Status: Completed Specimen: Blood Updated: 01/18/25 1212     Blood Culture, Routine No Growth to date      No Growth to date      No Growth to date    Fungus culture [[id number]]     Order Status: No result Specimen: Ascites     (rule out SBP) Gram stain [[id number]]     Order Status: No result Specimen: Ascites     (rule out SBP) Culture, Anaerobic [[id number]]     Order Status: No result Specimen: Ascites     (rule out SBP) Aerobic culture [[id number]]     Order Status: No result Specimen: Ascites     Gram stain [[id number]]     Order Status: Canceled Specimen: Body Fluid     Anaerobic culture [[id number]]     Order Status: Canceled Specimen: Body Fluid     Aerobic culture [[id number]]     Order Status: Canceled Specimen: Peritoneal Fluid     MRSA Screen by PCR [[id number]] Collected: 01/15/25 1630    Order Status: Completed Specimen: Nasal Swab Updated: 01/15/25 1852     MRSA SCREEN BY PCR Not Detected    Culture, Anaerobe [[id number]]     Order Status: Canceled Specimen: Body Fluid from Abdomen     Aerobic culture [[id number]]     Order Status: Canceled Specimen: Incision site             Significant Imaging: I have reviewed all pertinent imaging results/findings within the past 24 hours.

## 2025-01-19 LAB
ANION GAP SERPL CALC-SCNC: 10 MMOL/L (ref 8–16)
APTT PPP: 33.3 SEC (ref 21–32)
ASCENDING AORTA: 2.67 CM
BASOPHILS # BLD AUTO: 0.05 K/UL (ref 0–0.2)
BASOPHILS NFR BLD: 0.5 % (ref 0–1.9)
BSA FOR ECHO PROCEDURE: 1.9 M2
BUN SERPL-MCNC: 31 MG/DL (ref 6–20)
CALCIUM SERPL-MCNC: 9.1 MG/DL (ref 8.7–10.5)
CHLORIDE SERPL-SCNC: 92 MMOL/L (ref 95–110)
CO2 SERPL-SCNC: 25 MMOL/L (ref 23–29)
CREAT SERPL-MCNC: 1.4 MG/DL (ref 0.5–1.4)
CV ECHO LV RWT: 0.26 CM
DIFFERENTIAL METHOD BLD: ABNORMAL
DOP CALC LVOT AREA: 3.5 CM2
DOP CALC LVOT DIAMETER: 2.1 CM
E WAVE DECELERATION TIME: 110 MS
E/A RATIO: 1.19
E/E' RATIO: 12 M/S
ECHO EF ESTIMATED: 6 %
ECHO LV POSTERIOR WALL: 0.8 CM (ref 0.6–1.1)
EJECTION FRACTION: 10 %
EOSINOPHIL # BLD AUTO: 0 K/UL (ref 0–0.5)
EOSINOPHIL NFR BLD: 0.4 % (ref 0–8)
ERYTHROCYTE [DISTWIDTH] IN BLOOD BY AUTOMATED COUNT: 23.8 % (ref 11.5–14.5)
EST. GFR  (NO RACE VARIABLE): >60 ML/MIN/1.73 M^2
FRACTIONAL SHORTENING: 3.3 % (ref 28–44)
GLUCOSE SERPL-MCNC: 142 MG/DL (ref 70–110)
HBV CORE AB SERPL QL IA: NORMAL
HBV SURFACE AB SER-ACNC: <3 MIU/ML
HBV SURFACE AB SER-ACNC: NORMAL M[IU]/ML
HBV SURFACE AG SERPL QL IA: NORMAL
HCT VFR BLD AUTO: 30.4 % (ref 40–54)
HCV AB SERPL QL IA: NORMAL
HGB BLD-MCNC: 8.9 G/DL (ref 14–18)
HIV 1+2 AB+HIV1 P24 AG SERPL QL IA: NORMAL
IMM GRANULOCYTES # BLD AUTO: 0.05 K/UL (ref 0–0.04)
IMM GRANULOCYTES NFR BLD AUTO: 0.5 % (ref 0–0.5)
INR PPP: 2.1 (ref 0.8–1.2)
INTERVENTRICULAR SEPTUM: 0.8 CM (ref 0.6–1.1)
LA MAJOR: 5.9 CM
LA MINOR: 6.1 CM
LA WIDTH: 4.3 CM
LDH SERPL L TO P-CCNC: 269 U/L (ref 110–260)
LEFT ATRIUM SIZE: 3.1 CM
LEFT ATRIUM VOLUME INDEX MOD: 55 ML/M2
LEFT ATRIUM VOLUME INDEX: 35 ML/M2
LEFT ATRIUM VOLUME MOD: 106 ML
LEFT ATRIUM VOLUME: 68 CM3
LEFT INTERNAL DIMENSION IN SYSTOLE: 5.9 CM (ref 2.1–4)
LEFT VENTRICLE DIASTOLIC VOLUME INDEX: 95.51 ML/M2
LEFT VENTRICLE DIASTOLIC VOLUME: 185.28 ML
LEFT VENTRICLE MASS INDEX: 98.8 G/M2
LEFT VENTRICLE SYSTOLIC VOLUME INDEX: 89.6 ML/M2
LEFT VENTRICLE SYSTOLIC VOLUME: 173.77 ML
LEFT VENTRICULAR INTERNAL DIMENSION IN DIASTOLE: 6.1 CM (ref 3.5–6)
LEFT VENTRICULAR MASS: 191.6 G
LV LATERAL E/E' RATIO: 9
LV SEPTAL E/E' RATIO: 20.3
LYMPHOCYTES # BLD AUTO: 0.9 K/UL (ref 1–4.8)
LYMPHOCYTES NFR BLD: 8.8 % (ref 18–48)
MAGNESIUM SERPL-MCNC: 2.3 MG/DL (ref 1.6–2.6)
MCH RBC QN AUTO: 18.1 PG (ref 27–31)
MCHC RBC AUTO-ENTMCNC: 29.3 G/DL (ref 32–36)
MCV RBC AUTO: 62 FL (ref 82–98)
MONOCYTES # BLD AUTO: 0.8 K/UL (ref 0.3–1)
MONOCYTES NFR BLD: 7.6 % (ref 4–15)
MV PEAK A VEL: 0.68 M/S
MV PEAK E VEL: 0.81 M/S
NEUTROPHILS # BLD AUTO: 8.7 K/UL (ref 1.8–7.7)
NEUTROPHILS NFR BLD: 82.2 % (ref 38–73)
NRBC BLD-RTO: 0 /100 WBC
OHS CV RV/LV RATIO: 1.2 CM
PHOSPHATE SERPL-MCNC: 3.2 MG/DL (ref 2.7–4.5)
PISA TR MAX VEL: 2 M/S
PLATELET # BLD AUTO: 282 K/UL (ref 150–450)
PMV BLD AUTO: ABNORMAL FL (ref 9.2–12.9)
POCT GLUCOSE: 120 MG/DL (ref 70–110)
POCT GLUCOSE: 148 MG/DL (ref 70–110)
POCT GLUCOSE: 153 MG/DL (ref 70–110)
POCT GLUCOSE: 157 MG/DL (ref 70–110)
POCT GLUCOSE: 168 MG/DL (ref 70–110)
POTASSIUM SERPL-SCNC: 3.9 MMOL/L (ref 3.5–5.1)
PROTHROMBIN TIME: 22.1 SEC (ref 9–12.5)
RA MAJOR: 4.99 CM
RA PRESSURE ESTIMATED: 15 MMHG
RA WIDTH: 5.64 CM
RBC # BLD AUTO: 4.93 M/UL (ref 4.6–6.2)
RIGHT VENTRICLE DIASTOLIC BASEL DIMENSION: 7.3 CM
RV TB RVSP: 17 MMHG
RV TISSUE DOPPLER FREE WALL SYSTOLIC VELOCITY 1 (APICAL 4 CHAMBER VIEW): 6.28 CM/S
SINUS: 3.17 CM
SODIUM SERPL-SCNC: 127 MMOL/L (ref 136–145)
STJ: 2.76 CM
TDI LATERAL: 0.09 M/S
TDI SEPTAL: 0.04 M/S
TDI: 0.07 M/S
TR MAX PG: 16 MMHG
TRICUSPID ANNULAR PLANE SYSTOLIC EXCURSION: 0.65 CM
TV PEAK GRADIENT: 16 MMHG
TV REST PULMONARY ARTERY PRESSURE: 31 MMHG
WBC # BLD AUTO: 10.55 K/UL (ref 3.9–12.7)
Z-SCORE OF LEFT VENTRICULAR DIMENSION IN END DIASTOLE: 1.02
Z-SCORE OF LEFT VENTRICULAR DIMENSION IN END SYSTOLE: 4.38

## 2025-01-19 PROCEDURE — 20600001 HC STEP DOWN PRIVATE ROOM

## 2025-01-19 PROCEDURE — 99222 1ST HOSP IP/OBS MODERATE 55: CPT | Mod: ,,, | Performed by: NURSE PRACTITIONER

## 2025-01-19 PROCEDURE — 87389 HIV-1 AG W/HIV-1&-2 AB AG IA: CPT | Performed by: STUDENT IN AN ORGANIZED HEALTH CARE EDUCATION/TRAINING PROGRAM

## 2025-01-19 PROCEDURE — 87340 HEPATITIS B SURFACE AG IA: CPT | Performed by: STUDENT IN AN ORGANIZED HEALTH CARE EDUCATION/TRAINING PROGRAM

## 2025-01-19 PROCEDURE — 85610 PROTHROMBIN TIME: CPT | Performed by: NURSE PRACTITIONER

## 2025-01-19 PROCEDURE — 86706 HEP B SURFACE ANTIBODY: CPT | Performed by: STUDENT IN AN ORGANIZED HEALTH CARE EDUCATION/TRAINING PROGRAM

## 2025-01-19 PROCEDURE — 93750 INTERROGATION VAD IN PERSON: CPT | Mod: ,,, | Performed by: INTERNAL MEDICINE

## 2025-01-19 PROCEDURE — 85730 THROMBOPLASTIN TIME PARTIAL: CPT | Performed by: STUDENT IN AN ORGANIZED HEALTH CARE EDUCATION/TRAINING PROGRAM

## 2025-01-19 PROCEDURE — 86704 HEP B CORE ANTIBODY TOTAL: CPT | Performed by: STUDENT IN AN ORGANIZED HEALTH CARE EDUCATION/TRAINING PROGRAM

## 2025-01-19 PROCEDURE — 27000248 HC VAD-ADDITIONAL DAY

## 2025-01-19 PROCEDURE — 25000003 PHARM REV CODE 250

## 2025-01-19 PROCEDURE — 83615 LACTATE (LD) (LDH) ENZYME: CPT | Performed by: STUDENT IN AN ORGANIZED HEALTH CARE EDUCATION/TRAINING PROGRAM

## 2025-01-19 PROCEDURE — 63600175 PHARM REV CODE 636 W HCPCS: Performed by: NURSE PRACTITIONER

## 2025-01-19 PROCEDURE — 86803 HEPATITIS C AB TEST: CPT | Performed by: STUDENT IN AN ORGANIZED HEALTH CARE EDUCATION/TRAINING PROGRAM

## 2025-01-19 PROCEDURE — 25000003 PHARM REV CODE 250: Performed by: STUDENT IN AN ORGANIZED HEALTH CARE EDUCATION/TRAINING PROGRAM

## 2025-01-19 PROCEDURE — 85025 COMPLETE CBC W/AUTO DIFF WBC: CPT | Performed by: NURSE PRACTITIONER

## 2025-01-19 PROCEDURE — 87040 BLOOD CULTURE FOR BACTERIA: CPT | Mod: 59 | Performed by: STUDENT IN AN ORGANIZED HEALTH CARE EDUCATION/TRAINING PROGRAM

## 2025-01-19 PROCEDURE — 80048 BASIC METABOLIC PNL TOTAL CA: CPT | Performed by: NURSE PRACTITIONER

## 2025-01-19 PROCEDURE — 63600175 PHARM REV CODE 636 W HCPCS: Performed by: INTERNAL MEDICINE

## 2025-01-19 PROCEDURE — 83735 ASSAY OF MAGNESIUM: CPT | Performed by: NURSE PRACTITIONER

## 2025-01-19 PROCEDURE — 99233 SBSQ HOSP IP/OBS HIGH 50: CPT | Mod: 25,,, | Performed by: INTERNAL MEDICINE

## 2025-01-19 PROCEDURE — 25000003 PHARM REV CODE 250: Performed by: NURSE PRACTITIONER

## 2025-01-19 PROCEDURE — 25000003 PHARM REV CODE 250: Performed by: INTERNAL MEDICINE

## 2025-01-19 PROCEDURE — 84100 ASSAY OF PHOSPHORUS: CPT | Performed by: STUDENT IN AN ORGANIZED HEALTH CARE EDUCATION/TRAINING PROGRAM

## 2025-01-19 PROCEDURE — 99233 SBSQ HOSP IP/OBS HIGH 50: CPT | Mod: ,,, | Performed by: STUDENT IN AN ORGANIZED HEALTH CARE EDUCATION/TRAINING PROGRAM

## 2025-01-19 RX ORDER — INSULIN ASPART 100 [IU]/ML
10 INJECTION, SOLUTION INTRAVENOUS; SUBCUTANEOUS
Status: DISCONTINUED | OUTPATIENT
Start: 2025-01-19 | End: 2025-01-22

## 2025-01-19 RX ORDER — INSULIN ASPART 100 [IU]/ML
0-5 INJECTION, SOLUTION INTRAVENOUS; SUBCUTANEOUS
Status: DISCONTINUED | OUTPATIENT
Start: 2025-01-19 | End: 2025-01-25 | Stop reason: HOSPADM

## 2025-01-19 RX ORDER — HYDRALAZINE HYDROCHLORIDE 10 MG/1
10 TABLET, FILM COATED ORAL ONCE
Status: COMPLETED | OUTPATIENT
Start: 2025-01-19 | End: 2025-01-19

## 2025-01-19 RX ORDER — TORSEMIDE 20 MG/1
60 TABLET ORAL 2 TIMES DAILY
Status: DISCONTINUED | OUTPATIENT
Start: 2025-01-19 | End: 2025-01-25 | Stop reason: HOSPADM

## 2025-01-19 RX ORDER — POLYETHYLENE GLYCOL 3350 17 G/17G
17 POWDER, FOR SOLUTION ORAL DAILY
Status: DISCONTINUED | OUTPATIENT
Start: 2025-01-19 | End: 2025-01-25 | Stop reason: HOSPADM

## 2025-01-19 RX ADMIN — METHOCARBAMOL 500 MG: 500 TABLET ORAL at 09:01

## 2025-01-19 RX ADMIN — INSULIN ASPART 10 UNITS: 100 INJECTION, SOLUTION INTRAVENOUS; SUBCUTANEOUS at 01:01

## 2025-01-19 RX ADMIN — POLYETHYLENE GLYCOL 3350 17 G: 17 POWDER, FOR SOLUTION ORAL at 05:01

## 2025-01-19 RX ADMIN — TORSEMIDE 60 MG: 20 TABLET ORAL at 05:01

## 2025-01-19 RX ADMIN — GABAPENTIN 100 MG: 100 CAPSULE ORAL at 09:01

## 2025-01-19 RX ADMIN — ASPIRIN 81 MG: 81 TABLET, COATED ORAL at 09:01

## 2025-01-19 RX ADMIN — GABAPENTIN 100 MG: 100 CAPSULE ORAL at 08:01

## 2025-01-19 RX ADMIN — HYPROMELLOSE 2910 1 DROP: 5 SOLUTION/ DROPS OPHTHALMIC at 01:01

## 2025-01-19 RX ADMIN — METHOCARBAMOL 500 MG: 500 TABLET ORAL at 01:01

## 2025-01-19 RX ADMIN — POTASSIUM CHLORIDE 20 MEQ: 1500 TABLET, EXTENDED RELEASE ORAL at 09:01

## 2025-01-19 RX ADMIN — DULOXETINE HYDROCHLORIDE 30 MG: 30 CAPSULE, DELAYED RELEASE ORAL at 09:01

## 2025-01-19 RX ADMIN — POTASSIUM CHLORIDE 20 MEQ: 1500 TABLET, EXTENDED RELEASE ORAL at 08:01

## 2025-01-19 RX ADMIN — INSULIN GLARGINE 10 UNITS: 100 INJECTION, SOLUTION SUBCUTANEOUS at 09:01

## 2025-01-19 RX ADMIN — LEVETIRACETAM 1500 MG: 500 TABLET, FILM COATED ORAL at 09:01

## 2025-01-19 RX ADMIN — METHOCARBAMOL 500 MG: 500 TABLET ORAL at 05:01

## 2025-01-19 RX ADMIN — EMPAGLIFLOZIN 10 MG: 10 TABLET, FILM COATED ORAL at 09:01

## 2025-01-19 RX ADMIN — ATORVASTATIN CALCIUM 40 MG: 40 TABLET, FILM COATED ORAL at 09:01

## 2025-01-19 RX ADMIN — HYPROMELLOSE 2910 1 DROP: 5 SOLUTION/ DROPS OPHTHALMIC at 09:01

## 2025-01-19 RX ADMIN — HYDRALAZINE HYDROCHLORIDE 10 MG: 10 TABLET ORAL at 04:01

## 2025-01-19 RX ADMIN — HYPROMELLOSE 2910 1 DROP: 5 SOLUTION/ DROPS OPHTHALMIC at 08:01

## 2025-01-19 RX ADMIN — MUPIROCIN: 20 OINTMENT TOPICAL at 09:01

## 2025-01-19 RX ADMIN — INSULIN ASPART 10 UNITS: 100 INJECTION, SOLUTION INTRAVENOUS; SUBCUTANEOUS at 06:01

## 2025-01-19 RX ADMIN — EPLERENONE 50 MG: 25 TABLET, FILM COATED ORAL at 09:01

## 2025-01-19 RX ADMIN — METHOCARBAMOL 500 MG: 500 TABLET ORAL at 08:01

## 2025-01-19 RX ADMIN — AMIODARONE HYDROCHLORIDE 200 MG: 200 TABLET ORAL at 09:01

## 2025-01-19 RX ADMIN — PANTOPRAZOLE SODIUM 40 MG: 40 TABLET, DELAYED RELEASE ORAL at 09:01

## 2025-01-19 RX ADMIN — Medication 400 MG: at 08:01

## 2025-01-19 RX ADMIN — TORSEMIDE 60 MG: 20 TABLET ORAL at 10:01

## 2025-01-19 RX ADMIN — GABAPENTIN 400 MG: 400 CAPSULE ORAL at 08:01

## 2025-01-19 RX ADMIN — Medication 400 MG: at 09:01

## 2025-01-19 RX ADMIN — INSULIN ASPART 10 UNITS: 100 INJECTION, SOLUTION INTRAVENOUS; SUBCUTANEOUS at 09:01

## 2025-01-19 RX ADMIN — LEVETIRACETAM 1500 MG: 500 TABLET, FILM COATED ORAL at 08:01

## 2025-01-19 RX ADMIN — HYDRALAZINE HYDROCHLORIDE 10 MG: 10 TABLET ORAL at 12:01

## 2025-01-19 RX ADMIN — AMLODIPINE BESYLATE 10 MG: 10 TABLET ORAL at 09:01

## 2025-01-19 RX ADMIN — DEXTROSE MONOHYDRATE 6 G: 25000 INJECTION, SOLUTION INTRAVENOUS at 04:01

## 2025-01-19 RX ADMIN — WARFARIN SODIUM 1.5 MG: 1 TABLET ORAL at 05:01

## 2025-01-19 RX ADMIN — HYPROMELLOSE 2910 1 DROP: 5 SOLUTION/ DROPS OPHTHALMIC at 05:01

## 2025-01-19 NOTE — CONSULTS
"[facility] - Cardiac Intensive Care  Endocrinology  Diabetes Consult Note    Consult Requested by: Josh yRan, *   Reason for admit: Acute on chronic systolic CHF (congestive heart failure)    HISTORY OF PRESENT ILLNESS:  Reason for Consult: Management of T2DM, Hyperglycemia      Surgical Procedure and Date: LVAD 2022      Diabetes diagnosis year:      Home Diabetes Medications:    - Lantus 20 units daily  - Novolog SSI with meals   - Novolog SSI  150 - 200 + 2 unit    201 - 250 + 4 units    251 - 300 + 6 units    301 - 350 + 8 units       > 350   + 10 units  - Jardiance 10 mg       How often checking glucose at home - 3x daily   BG readings on regimen: " pt does not recall"  Hypoglycemia on the regimen? No  Missed doses on regimen?  No     Diabetes Complications include:   Hyperglycemia     Complicating diabetes co morbidities:   History of MI, CHF, and CKD        HPI:   Patient is a 39 y.o. 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 He underwent RVAD removal and chest closure 2022. He is off inotropes. He presents as a transfer from OSH for sepsis.  Endocrine consulted for bg management.    Interval HPI:   No acute events overnight. Patient in room ONTB1954/GJVB5349 A. Blood glucose stable. BG at goal on current insulin regimen (SSI, prandial, and basal insulin ). Steroid use- None .      Renal function- Normal   Vasopressors-  None     Diet Cardiac     Eatin%  Nausea: No  Hypoglycemia and intervention: No  Fever: No  TPN and/or TF: No    PMH, PSH, FH, SH updated and reviewed     ROS:    Review of Systems    Current Medications and/or Treatments Impacting Glycemic Control  Immunotherapy:    Immunosuppressants       None          Steroids:   Hormones (From admission, onward)      None " "         Pressors:    Autonomic Drugs (From admission, onward)      None          Hyperglycemia/Diabetes Medications:   Antihyperglycemics (From admission, onward)      Start     Stop Route Frequency Ordered    01/15/25 0900  empagliflozin (Jardiance) tablet 10 mg        Question Answer Comment   Does this patient have a diagnosis of heart failure? Yes    Does this patient have type 1 diabetes or diabetic ketoacidosis? No    Does this patient have symptomatic hypotension? No    Is the patient NPO or pending major surgery in next 3 days or less? No        -- Oral Daily 01/15/25 0312    01/15/25 0900  insulin glargine U-100 (Lantus) pen 10 Units         -- SubQ Daily 01/15/25 0312    01/15/25 0745  insulin aspart U-100 pen 10 Units        Placed in "And" Linked Group    -- SubQ 3 times daily with meals 01/15/25 0312    01/15/25 0309  insulin aspart U-100 pen 0-10 Units        Placed in "And" Linked Group    -- SubQ As needed (PRN) 01/15/25 0312             PHYSICAL EXAMINATION:  Vitals:    01/19/25 0535   BP:    Pulse: 84   Resp:    Temp:      Body mass index is 17.28 kg/m².     Physical Exam   Constitutional: Well developed, well nourished, NAD.  ENT: External ears no masses with nose patent; normal hearing.  Neck: Supple; trachea midline.  Cardiovascular: Normal rate and regular rhythm. VAD hum.   Lungs: Normal effort; lungs anterior bilaterally clear to auscultation.  Abdomen: Soft, no masses, no hernias.  MS: No clubbing or cyanosis of nails noted; unable to assess gait.  Skin: No rashes, lesions, or ulcers; no nodules. Injection sites are ok. No lipo hypertropthy or atrophy.  Psychiatric: Good judgement and insight; normal mood and affect.  Neurological: Cranial nerves are grossly intact.   Foot: Nails in good condition, no amputations noted.      Labs Reviewed and Include   Recent Labs   Lab 01/18/25  1122 01/18/25  1343 01/19/25 0318   GLU  --    < > 142*   CALCIUM  --    < > 9.1   ALBUMIN 2.5*  --   --  " "  PROT 9.6*  --   --    NA  --    < > 127*   K  --    < > 3.9   CO2  --    < > 25   CL  --    < > 92*   BUN  --    < > 31*   CREATININE  --    < > 1.4   ALKPHOS 173*  --   --     < > = values in this interval not displayed.     Lab Results   Component Value Date    WBC 10.55 01/19/2025    HGB 8.9 (L) 01/19/2025    HCT 30.4 (L) 01/19/2025    MCV 62 (L) 01/19/2025     01/19/2025     No results for input(s): "TSH", "FREET4" in the last 168 hours.  Lab Results   Component Value Date    HGBA1C 10.6 (H) 12/31/2024       Nutritional status:   Body mass index is 18.75 kg/m².  Lab Results   Component Value Date    ALBUMIN 2.5 (L) 01/18/2025    ALBUMIN 2.8 (L) 01/17/2025    ALBUMIN 2.2 (L) 01/15/2025     Lab Results   Component Value Date    PREALBUMIN 12 (L) 01/17/2025    PREALBUMIN 10 (L) 01/15/2025    PREALBUMIN 9 (L) 01/08/2025       Estimated Creatinine Clearance: 68.1 mL/min (based on SCr of 1.4 mg/dL).    Accu-Checks  Recent Labs     01/17/25  1237 01/17/25  1709 01/17/25  1806 01/17/25  1930 01/18/25  0857 01/18/25  1222 01/18/25  1702 01/18/25  2212 01/19/25  0643 01/19/25  0825   POCTGLUCOSE 149* 179* 181* 184* 146* 223* 125* 150* 168* 153*        ASSESSMENT and PLAN    Cardiac/Vascular  * Acute on chronic systolic CHF (congestive heart failure)  Managed per primary team  Optimize bg control      LVAD (left ventricular assist device) present  Managed per primary team  Optimize bg control        Endocrine  Type 2 diabetes mellitus with hyperglycemia  BG goal: 140-180  T2DM.    - Jardiance 10 mg daily (home medication; started per primary team)0  -Continue Lantus 10 units once daily  -Continue Novolog 10 units TID with meals  -Continue Low Dose Correction Scale   - POCT Glucose before meals and at bedtime  - Hypoglycemia protocol in place      ** Please notify Endocrine for any change and/or advance in diet**  ** Please call Endocrine for any BG related issues **     Discharge Planning:   TBD. Please notify " endocrinology prior to discharge.            Plan discussed with patient at bedside.     Jody Starkey NP  Endocrinology  [facility] - Cardiac Intensive [facility]

## 2025-01-19 NOTE — PLAN OF CARE
Problem: Adult Inpatient Plan of Care  Goal: Absence of Hospital-Acquired Illness or Injury  Outcome: Progressing     Problem: Diabetes Comorbidity  Goal: Blood Glucose Level Within Targeted Range  Outcome: Progressing     Problem: Sepsis/Septic Shock  Goal: Absence of Bleeding  Outcome: Progressing     Problem: Ventricular Assist Device  Goal: Optimal Adjustment to Device  Outcome: Progressing     Problem: Heart Failure  Goal: Optimal Cardiac Output  Outcome: Progressing

## 2025-01-19 NOTE — PROGRESS NOTES
01/19/25 0000   Vital Signs   BP (!) 94/0   BP Location Left arm   BP Method Doppler   Patient Position Lying     Elevated DP, unable to obtain MAP. On call \[facility]\"" aware, one time dose PO hydralazine ordered.     DP remains 94 at time of morning VS, still unable to obtain MAP. Dr. Noe aware. Second 1x dose PO hydralazine ordered. Pt remains asymptomatic, denies CHANG.

## 2025-01-19 NOTE — PROGRESS NOTES
[facility] - Cardiac [facility]ensive [facility]  Heart Transplant  Progress Note    Patient Name: Kevan Queen  MRN: [id number]  Admission Date: 1/15/2025  Hospital Length of Stay: 4 days  Attending Physician: Josh Ryan, *  Primary Care Provider: Rosio Armendariz FNP  Principal Problem:Acute on chronic systolic CHF (congestive heart failure)    Subjective:   Interval History: NAOEN. Blood cultures growing staph. ID following and changed abx from Vanc and Zosyn to Ancef. TTE pending for evaluation. Ophtho w/ no concern for infectious cause of blurry vision, more likely related to uncontrolled diabetes. Counseled pt on diabetes compliance and need for outpatient management as well/to go to his outpatient appts with pcp/endocrine.     Cr improving today, transitioned to po torsemide 60mg BID. CXR ordered due to pleuritic pain, but negative for any acute process. Will monitor.     Paracentesis with blood, protein is elevated likely because of this. Gram stain is negative for bacteria or leukocytosis. Cultures pending.     Scheduled Meds:   amiodarone  200 mg Oral Daily    amLODIPine  10 mg Oral Daily    artificial tears  1 drop Both Eyes QID WAKE    aspirin  81 mg Oral Daily    atorvastatin  40 mg Oral Daily    ceFAZolin (Ancef) 6 g in D5W 500 mL CONTINUOUS INFUSION  6 g Intravenous Q24H    DULoxetine  30 mg Oral Daily    empagliflozin  10 mg Oral Daily    eplerenone  50 mg Oral Daily    gabapentin  100 mg Oral BID    And    gabapentin  400 mg Oral QHS    insulin aspart U-100  10 Units Subcutaneous TIDWM    insulin glargine U-100  10 Units Subcutaneous Daily    levETIRAcetam  1,500 mg Oral BID    magnesium oxide  400 mg Oral BID    methocarbamoL  500 mg Oral QID    mupirocin   Nasal BID    pantoprazole  40 mg Oral Daily    potassium chloride  20 mEq Oral BID    torsemide  60 mg Oral BID loop    warfarin  1.5 mg Oral Daily     PRN Meds:  Current Facility-Administered Medications:     acetaminophen, 650 mg, Oral,  Q6H PRN    dextrose 50%, 12.5 g, Intravenous, PRN    dextrose 50%, 25 g, Intravenous, PRN    glucagon (human recombinant), 1 mg, Intramuscular, PRN    glucose, 16 g, Oral, PRN    glucose, 24 g, Oral, PRN    insulin aspart U-100, 0-5 Units, Subcutaneous, QID (AC + HS) PRN    ondansetron, 4 mg, Oral, Q8H PRN    senna-docusate 8.6-50 mg, 1 tablet, Oral, Daily PRN    Review of patient's allergies indicates:   Allergen Reactions    Bumex [bumetanide] Hives     Objective:     Vital Signs (Most Recent):  Temp: 98.2 °F (36.8 °C) (01/19/25 0742)  Pulse: 73 (01/19/25 0900)  Resp: 19 (01/19/25 0742)  BP: (!) 90/0 (01/19/25 0900)  SpO2: 98 % (01/19/25 0742) Vital Signs (24h Range):  Temp:  [97.6 °F (36.4 °C)-98.4 °F (36.9 °C)] 98.2 °F (36.8 °C)  Pulse:  [61-87] 73  Resp:  [18-19] 19  SpO2:  [91 %-100 %] 98 %  BP: ()/(0-49) 90/0     Patient Vitals for the past 72 hrs (Last 3 readings):   Weight   01/19/25 0900 68 kg (150 lb)   01/17/25 0746 62.7 kg (138 lb 3.7 oz)   01/17/25 0505 66.2 kg (145 lb 15.1 oz)     Body mass index is 18.75 kg/m².      Intake/Output Summary (Last 24 hours) at 1/19/2025 1123  Last data filed at 1/19/2025 0731  Gross per 24 hour   Intake 2198.48 ml   Output 4500 ml   Net -2301.52 ml       Telemetry: NSR 90s       Physical Exam  Vitals and nursing note reviewed.   Constitutional:       Appearance: He is well-developed.   HENT:      Head: Normocephalic.   Eyes:      Pupils: Pupils are equal, round, and reactive to light.   Cardiovascular:      Rate and Rhythm: Normal rate and regular rhythm.      Heart sounds: Murmur heard.      Comments: VAD hum, elevated JVD  Pulmonary:      Effort: Pulmonary effort is normal.      Breath sounds: Normal breath sounds.   Abdominal:      General: Bowel sounds are normal. There is distension.      Palpations: Abdomen is soft.   Musculoskeletal:         General: Normal range of motion.      Cervical back: Normal range of motion and neck supple.      Right lower leg: No  edema.      Left lower leg: No edema.   Skin:     General: Skin is warm and dry.   Neurological:      Mental Status: He is alert and oriented to person, place, and time.   Psychiatric:         Behavior: Behavior normal.            Significant Labs:  CBC:  Recent Labs   Lab 01/17/25  0512 01/18/25  0348 01/18/25  1123 01/19/25  0318   WBC 10.81 9.40 9.31 10.55   RBC 5.35 5.05  --  4.93   HGB 9.6* 9.1*  --  8.9*   HCT 32.4* 30.7*  --  30.4*    262  --  282   MCV 61* 61*  --  62*   MCH 17.9* 18.0*  --  18.1*   MCHC 29.6* 29.6*  --  29.3*     BNP:  Recent Labs   Lab 01/14/25  0250 01/15/25  0436 01/17/25  0512   BNP 1,009.6* 1,157* 717*     CMP:  Recent Labs   Lab 01/15/25  0436 01/15/25  0740 01/16/25  0421 01/17/25  0512 01/17/25  1315 01/18/25  0348 01/18/25  1122 01/18/25  1343 01/19/25  0318   GLU  --  182*   < > 129*   < > 114*  --  106 142*   CALCIUM  --  8.3*   < > 9.6   < > 9.3  --  9.5 9.1   ALBUMIN 2.4* 2.2*  --  2.8*  --   --  2.5*  --   --    PROT 8.6* 7.9  --  10.4*  --   --  9.6*  --   --    NA  --  132*   < > 132*   < > 129*  --  131* 127*   K  --  3.4*   < > 3.0*   < > 3.5  --  3.3* 3.9   CO2  --  25   < > 30*   < > 28  --  30* 25   CL  --  100   < > 88*   < > 89*  --  89* 92*   BUN  --  19   < > 23*   < > 31*  --  30* 31*   CREATININE  --  1.3   < > 1.7*   < > 1.7*  --  1.6* 1.4   ALKPHOS 150 141  --  193*  --   --  173*  --   --    ALT 14 14  --  17  --   --   --   --   --    AST 35 31  --  35  --   --   --   --   --    BILITOT 2.4* 2.3*  --  1.9*  --   --   --   --   --     < > = values in this interval not displayed.      Coagulation:   Recent Labs   Lab 01/17/25 0512 01/18/25 0348 01/19/25 0318   INR 2.1* 2.2* 2.1*   APTT 31.9 33.1* 33.3*     LDH:  Recent Labs   Lab 01/17/25  0512 01/18/25 0348 01/18/25  1123 01/19/25 0318   * 282* 238 269*     Microbiology:  Microbiology Results (last 7 days)       Procedure Component Value Units Date/Time    Blood culture [[id number]]  Collected: 01/19/25 0318    Order Status: Sent Specimen: Blood Updated: 01/19/25 0400    Blood culture [[id number]] Collected: 01/19/25 0318    Order Status: Sent Specimen: Blood Updated: 01/19/25 0400    (rule out SBP) Gram stain [[id number]] Collected: 01/18/25 1454    Order Status: Completed Specimen: Ascites Updated: 01/18/25 2124     Gram Stain Result No WBC's      No organisms seen    Narrative:      To rule out SBP order labs: Aerobic culture [LWY442],  Culture, Anaerobic [RSL451], Gram stain [SSA300], Albumin  [GDB888], Protein [QKV581], LDH [DTW757], WBC \T\ Dff  [BLW0357].    Fungus culture [[id number]] Collected: 01/18/25 1454    Order Status: Sent Specimen: Ascites Updated: 01/18/25 1531    (rule out SBP) Aerobic culture [[id number]] Collected: 01/18/25 1454    Order Status: Sent Specimen: Ascites Updated: 01/18/25 1531    (rule out SBP) Culture, Anaerobic [[id number]] Collected: 01/18/25 1454    Order Status: Sent Specimen: Ascites Updated: 01/18/25 1530    Blood culture [[id number]] Collected: 01/16/25 1006    Order Status: Completed Specimen: Blood Updated: 01/18/25 1212     Blood Culture, Routine No Growth to date      No Growth to date      No Growth to date    Blood culture [[id number]] Collected: 01/16/25 1006    Order Status: Completed Specimen: Blood Updated: 01/18/25 1212     Blood Culture, Routine No Growth to date      No Growth to date      No Growth to date    Gram stain [[id number]]     Order Status: Canceled Specimen: Body Fluid     Anaerobic culture [[id number]]     Order Status: Canceled Specimen: Body Fluid     Aerobic culture [[id number]]     Order Status: Canceled Specimen: Peritoneal Fluid     MRSA Screen by PCR [[id number]] Collected: 01/15/25 1630    Order Status: Completed Specimen: Nasal Swab Updated: 01/15/25 1852     MRSA SCREEN BY PCR Not Detected    Culture, Anaerobe [[id number]]     Order Status: Canceled Specimen: Body Fluid from Abdomen     Aerobic culture [[id number]]      Order Status: Canceled Specimen: Incision site             I have reviewed all pertinent labs within the past 24 hours.    Estimated Creatinine Clearance: 68.1 mL/min (based on SCr of 1.4 mg/dL).    Diagnostic Results:  I have reviewed all pertinent imaging results/findings within the past 24 hours.  Assessment and Plan:     No notes on file    * Acute on chronic systolic CHF (congestive heart failure)  -NICM  -Last 2D Echo  8/26/24 : LVEF 5-10%, LVEDD  7.2 cm  -Diuresed with Lasix gtt and was given Metolazone 1/16.  Lasix gtt decreased to 10mg/hr due to hypokalemia and leg cramping.  Looks more euvolemic today.  Will stop Lasix gtt and consider starting po diuretics tomorrow.  (Previous home diuretics dose was Torsemide 60mg BID)  -GDMT with Eplerenone  -2g Na dietary restriction, 1500 mL fluid restriction, strict I/Os      LVAD (left ventricular assist device) present  HeartMate 3 Implanted 6/23/2022 with early RV failure requiring RVAD with ProTek Duo   -Continue Coumadin,  Goal INR 2.0-3.0 . Therapeutic today.  Previous home regimen 1.5mg Qdaily  -Antiplatelets ASA 81 mg, on hold with GIB  -LDH is stable overall today. Will continue to monitor daily.  -Speed set at 5100  -Interrogation notable for no events  -Not listed for OHTx    Procedure: Device Interrogation Including analysis of device parameters  Current Settings: Ventricular Assist Device        1/19/2025     4:09 PM 1/19/2025     1:00 PM 1/19/2025     9:00 AM 1/19/2025     3:44 AM 1/18/2025    11:56 PM 1/18/2025     7:30 PM 1/18/2025     3:24 PM   TXP LVAD INTERROGATIONS   Type HeartMate3 HeartMate3 HeartMate3 HeartMate3 HeartMate3 HeartMate3    Flow 4.2 3.7 3.7 3.8 3.7 3.8 3.7   Speed 5100 5100 5100 5100 5100 5150 5150   PI 5.2 5.1 5.9 6.7 6.6 6.3 6.5   Power (Serna) 3.6 4.9 3.6 3.6 3.7 3.7 3.7   LSL 4700      4700   Pulsatility Intermittent pulse Intermittent pulse Intermittent pulse Intermittent pulse Intermittent pulse Intermittent pulse  Intermittent pulse         Ascites  -Will monitor with diuresis     HTN (hypertension)  -On home dose of Amlodipine and Eplerenone    Chronic anticoagulation  -See above VAD    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   -Repeat Blood Cx 1/16 negative thus far  -Started on Vanc/Zosyn.  -ID consult.   -Vanc and Zosyn changed to Ancef  -TTE and ophthalmology consult ordered per ID recommendations   -diagnostic paracentesis -negative so far, but cultures pending.     Right heart failure secondary to left heart failure-post LVAD surgery  -Weaned off inotropes 8/2024  -See above heart failure     Temporal lobe seizure  -On home dose of Keppra         Alice Ayala MD  Heart Transplant  [facility] - [facility]

## 2025-01-19 NOTE — HPI
"Reason for Consult: Management of T2DM, Hyperglycemia      Surgical Procedure and Date: LVAD 06/29/2022      Diabetes diagnosis year: 2022     Home Diabetes Medications:    - Lantus 20 units daily  - Novolog SSI with meals   - Novolog SSI  150 - 200 + 2 unit    201 - 250 + 4 units    251 - 300 + 6 units    301 - 350 + 8 units       > 350   + 10 units  - Jardiance 10 mg       How often checking glucose at home - 3x daily   BG readings on regimen: " pt does not recall"  Hypoglycemia on the regimen? No  Missed doses on regimen?  No     Diabetes Complications include:   Hyperglycemia     Complicating diabetes co morbidities:   History of MI, CHF, and CKD        HPI:   Patient is a 39 y.o. male with stage D CHF due to NICM, polysubstance abuse, tobacco use, DM, underwent DT-HM3 implantation 6/23/2022 with early RV failure requiring RVAD with ProTek Duo who admitted with ADHF/cardiogenic shock on home milrinone requiring IABP. He underwent RVAD removal and chest closure 6/30/2022. He is off inotropes. He presents as a transfer from OSH for sepsis.  Endocrine consulted for bg management.  "

## 2025-01-19 NOTE — PROGRESS NOTES
"   01/19/25 1459        VAD 06/29/22 1115 Left ventricular assist device HeartMate 3   Placement Date/Time: 06/29/22 1115   Present Prior to Hospital Arrival?: No  Inserted by: MD  VAD Type: Left ventricular assist device  VAD Brand: HeartMate 3   Site Location Abdomen right   Site Assessment Dry;Intact;Clean   Driveline Exit Site 2   Driveline Assessment Free of Kinks;Intact   Dressing Status Clean;Dry;Intact   Dressing Intervention Sterile dressing change   Performed By RN   Dressing Change Schedule Daily   Dressing Change Due 01/20/25   Integrity dry;intact   [place] in use [place] askew   Condition CDI     LVAD dressing change completed using sterile technique with kit. DLES is a "2" with minimal drainage noted on the drain sponge. Tolerated without any complication. No redness or tenderness noted.   "

## 2025-01-19 NOTE — SUBJECTIVE & OBJECTIVE
"Interval HPI:   No acute events overnight. Patient in room [id number] A. Blood glucose stable. BG at goal on current insulin regimen (SSI, prandial, and basal insulin ). Steroid use- None .      Renal function- Normal   Vasopressors-  None     Diet Cardiac     Eatin%  Nausea: No  Hypoglycemia and intervention: No  Fever: No  TPN and/or TF: No    PMH, PSH, FH, SH updated and reviewed     ROS:    Review of Systems    Current Medications and/or Treatments Impacting Glycemic Control  Immunotherapy:    Immunosuppressants       None          Steroids:   Hormones (From admission, onward)      None          Pressors:    Autonomic Drugs (From admission, onward)      None          Hyperglycemia/Diabetes Medications:   Antihyperglycemics (From admission, onward)      Start     Stop Route Frequency Ordered    01/15/25 0900  empagliflozin (Jardiance) tablet 10 mg        Question Answer Comment   Does this patient have a diagnosis of heart failure? Yes    Does this patient have type 1 diabetes or diabetic ketoacidosis? No    Does this patient have symptomatic hypotension? No    Is the patient NPO or pending major surgery in next 3 days or less? No        -- Oral Daily 01/15/25 0312    01/15/25 0900  insulin glargine U-100 (Lantus) pen 10 Units         -- SubQ Daily 01/15/25 0312    01/15/25 0745  insulin aspart U-100 pen 10 Units        Placed in "And" Linked Group    -- SubQ 3 times daily with meals 01/15/25 0312    01/15/25 0309  insulin aspart U-100 pen 0-10 Units        Placed in "And" Linked Group    -- SubQ As needed (PRN) 01/15/25 0312             PHYSICAL EXAMINATION:  Vitals:    25 0535   BP:    Pulse: 84   Resp:    Temp:      Body mass index is 17.28 kg/m².     Physical Exam   Constitutional: Well developed, well nourished, NAD.  ENT: External ears no masses with nose patent; normal hearing.  Neck: Supple; trachea midline.  Cardiovascular: Normal rate and regular rhythm. VAD hum.   Lungs: Normal " effort; lungs anterior bilaterally clear to auscultation.  Abdomen: Soft, no masses, no hernias.  MS: No clubbing or cyanosis of nails noted; unable to assess gait.  Skin: No rashes, lesions, or ulcers; no nodules. Injection sites are ok. No lipo hypertropthy or atrophy.  Psychiatric: Good judgement and insight; normal mood and affect.  Neurological: Cranial nerves are grossly intact.   Foot: Nails in good condition, no amputations noted.

## 2025-01-19 NOTE — ASSESSMENT & PLAN NOTE
BG goal: 140-180  T2DM.    - Jardiance 10 mg daily (home medication; started per primary team)0  -Continue Lantus 10 units once daily  -Continue Novolog 10 units TID with meals  -Continue Low Dose Correction Scale   - POCT Glucose before meals and at bedtime  - Hypoglycemia protocol in place      ** Please notify Endocrine for any change and/or advance in diet**  ** Please call Endocrine for any BG related issues **     Discharge Planning:   TBD. Please notify endocrinology prior to discharge.

## 2025-01-19 NOTE — SUBJECTIVE & OBJECTIVE
Interval History: NAOEN. Blood cultures growing staph. ID following and changed abx from Vanc and Zosyn to Ancef. TTE pending for evaluation. Ophtho w/ no concern for infectious cause of blurry vision, more likely related to uncontrolled diabetes. Counseled pt on diabetes compliance and need for outpatient management as well/to go to his outpatient appts with pcp/endocrine.     Cr improving today, transitioned to po torsemide 60mg BID. CXR ordered due to pleuritic pain, but negative for any acute process. Will monitor.     Paracentesis with blood, protein is elevated likely because of this. Gram stain is negative for bacteria or leukocytosis. Cultures pending.     Scheduled Meds:   amiodarone  200 mg Oral Daily    amLODIPine  10 mg Oral Daily    artificial tears  1 drop Both Eyes QID WAKE    aspirin  81 mg Oral Daily    atorvastatin  40 mg Oral Daily    ceFAZolin (Ancef) 6 g in D5W 500 mL CONTINUOUS INFUSION  6 g Intravenous Q24H    DULoxetine  30 mg Oral Daily    empagliflozin  10 mg Oral Daily    eplerenone  50 mg Oral Daily    gabapentin  100 mg Oral BID    And    gabapentin  400 mg Oral QHS    insulin aspart U-100  10 Units Subcutaneous TIDWM    insulin glargine U-100  10 Units Subcutaneous Daily    levETIRAcetam  1,500 mg Oral BID    magnesium oxide  400 mg Oral BID    methocarbamoL  500 mg Oral QID    mupirocin   Nasal BID    pantoprazole  40 mg Oral Daily    potassium chloride  20 mEq Oral BID    torsemide  60 mg Oral BID loop    warfarin  1.5 mg Oral Daily     PRN Meds:  Current Facility-Administered Medications:     acetaminophen, 650 mg, Oral, Q6H PRN    dextrose 50%, 12.5 g, Intravenous, PRN    dextrose 50%, 25 g, Intravenous, PRN    glucagon (human recombinant), 1 mg, Intramuscular, PRN    glucose, 16 g, Oral, PRN    glucose, 24 g, Oral, PRN    insulin aspart U-100, 0-5 Units, Subcutaneous, QID (AC + HS) PRN    ondansetron, 4 mg, Oral, Q8H PRN    senna-docusate 8.6-50 mg, 1 tablet, Oral, Daily  PRN    Review of patient's allergies indicates:   Allergen Reactions    Bumex [bumetanide] Hives     Objective:     Vital Signs (Most Recent):  Temp: 98.2 °F (36.8 °C) (01/19/25 0742)  Pulse: 73 (01/19/25 0900)  Resp: 19 (01/19/25 0742)  BP: (!) 90/0 (01/19/25 0900)  SpO2: 98 % (01/19/25 0742) Vital Signs (24h Range):  Temp:  [97.6 °F (36.4 °C)-98.4 °F (36.9 °C)] 98.2 °F (36.8 °C)  Pulse:  [61-87] 73  Resp:  [18-19] 19  SpO2:  [91 %-100 %] 98 %  BP: ()/(0-49) 90/0     Patient Vitals for the past 72 hrs (Last 3 readings):   Weight   01/19/25 0900 68 kg (150 lb)   01/17/25 0746 62.7 kg (138 lb 3.7 oz)   01/17/25 0505 66.2 kg (145 lb 15.1 oz)     Body mass index is 18.75 kg/m².      Intake/Output Summary (Last 24 hours) at 1/19/2025 1123  Last data filed at 1/19/2025 0731  Gross per 24 hour   Intake 2198.48 ml   Output 4500 ml   Net -2301.52 ml       Telemetry: NSR 90s       Physical Exam  Vitals and nursing note reviewed.   Constitutional:       Appearance: He is well-developed.   HENT:      Head: Normocephalic.   Eyes:      Pupils: Pupils are equal, round, and reactive to light.   Cardiovascular:      Rate and Rhythm: Normal rate and regular rhythm.      Heart sounds: Murmur heard.      Comments: VAD hum, elevated JVD  Pulmonary:      Effort: Pulmonary effort is normal.      Breath sounds: Normal breath sounds.   Abdominal:      General: Bowel sounds are normal. There is distension.      Palpations: Abdomen is soft.   Musculoskeletal:         General: Normal range of motion.      Cervical back: Normal range of motion and neck supple.      Right lower leg: No edema.      Left lower leg: No edema.   Skin:     General: Skin is warm and dry.   Neurological:      Mental Status: He is alert and oriented to person, place, and time.   Psychiatric:         Behavior: Behavior normal.            Significant Labs:  CBC:  Recent Labs   Lab 01/17/25  0512 01/18/25  0348 01/18/25  1123 01/19/25  0318   WBC 10.81 9.40 9.31  10.55   RBC 5.35 5.05  --  4.93   HGB 9.6* 9.1*  --  8.9*   HCT 32.4* 30.7*  --  30.4*    262  --  282   MCV 61* 61*  --  62*   MCH 17.9* 18.0*  --  18.1*   MCHC 29.6* 29.6*  --  29.3*     BNP:  Recent Labs   Lab 01/14/25  0250 01/15/25  0436 01/17/25  0512   BNP 1,009.6* 1,157* 717*     CMP:  Recent Labs   Lab 01/15/25  0436 01/15/25  0740 01/16/25  0421 01/17/25  0512 01/17/25  1315 01/18/25  0348 01/18/25  1122 01/18/25  1343 01/19/25  0318   GLU  --  182*   < > 129*   < > 114*  --  106 142*   CALCIUM  --  8.3*   < > 9.6   < > 9.3  --  9.5 9.1   ALBUMIN 2.4* 2.2*  --  2.8*  --   --  2.5*  --   --    PROT 8.6* 7.9  --  10.4*  --   --  9.6*  --   --    NA  --  132*   < > 132*   < > 129*  --  131* 127*   K  --  3.4*   < > 3.0*   < > 3.5  --  3.3* 3.9   CO2  --  25   < > 30*   < > 28  --  30* 25   CL  --  100   < > 88*   < > 89*  --  89* 92*   BUN  --  19   < > 23*   < > 31*  --  30* 31*   CREATININE  --  1.3   < > 1.7*   < > 1.7*  --  1.6* 1.4   ALKPHOS 150 141  --  193*  --   --  173*  --   --    ALT 14 14  --  17  --   --   --   --   --    AST 35 31  --  35  --   --   --   --   --    BILITOT 2.4* 2.3*  --  1.9*  --   --   --   --   --     < > = values in this interval not displayed.      Coagulation:   Recent Labs   Lab 01/17/25  0512 01/18/25  0348 01/19/25 0318   INR 2.1* 2.2* 2.1*   APTT 31.9 33.1* 33.3*     LDH:  Recent Labs   Lab 01/17/25  0512 01/18/25  0348 01/18/25  1123 01/19/25 0318   * 282* 238 269*     Microbiology:  Microbiology Results (last 7 days)       Procedure Component Value Units Date/Time    Blood culture [[id number]] Collected: 01/19/25 0318    Order Status: Sent Specimen: Blood Updated: 01/19/25 0400    Blood culture [[id number]] Collected: 01/19/25 0318    Order Status: Sent Specimen: Blood Updated: 01/19/25 0400    (rule out SBP) Gram stain [[id number]] Collected: 01/18/25 5746    Order Status: Completed Specimen: Ascites Updated: 01/18/25 2125     Gram Stain Result No  WBC's      No organisms seen    Narrative:      To rule out SBP order labs: Aerobic culture [WHP459],  Culture, Anaerobic [MHI741], Gram stain [BIU734], Albumin  [VKW162], Protein [SNY200], LDH [FEG404], WBC \T\ Dff  [HDI0730].    Fungus culture [[id number]] Collected: 01/18/25 1454    Order Status: Sent Specimen: Ascites Updated: 01/18/25 1531    (rule out SBP) Aerobic culture [[id number]] Collected: 01/18/25 1454    Order Status: Sent Specimen: Ascites Updated: 01/18/25 1531    (rule out SBP) Culture, Anaerobic [[id number]] Collected: 01/18/25 1454    Order Status: Sent Specimen: Ascites Updated: 01/18/25 1530    Blood culture [[id number]] Collected: 01/16/25 1006    Order Status: Completed Specimen: Blood Updated: 01/18/25 1212     Blood Culture, Routine No Growth to date      No Growth to date      No Growth to date    Blood culture [[id number]] Collected: 01/16/25 1006    Order Status: Completed Specimen: Blood Updated: 01/18/25 1212     Blood Culture, Routine No Growth to date      No Growth to date      No Growth to date    Gram stain [[id number]]     Order Status: Canceled Specimen: Body Fluid     Anaerobic culture [[id number]]     Order Status: Canceled Specimen: Body Fluid     Aerobic culture [[id number]]     Order Status: Canceled Specimen: Peritoneal Fluid     MRSA Screen by PCR [[id number]] Collected: 01/15/25 1630    Order Status: Completed Specimen: Nasal Swab Updated: 01/15/25 1852     MRSA SCREEN BY PCR Not Detected    Culture, Anaerobe [[id number]]     Order Status: Canceled Specimen: Body Fluid from Abdomen     Aerobic culture [[id number]]     Order Status: Canceled Specimen: Incision site             I have reviewed all pertinent labs within the past 24 hours.    Estimated Creatinine Clearance: 68.1 mL/min (based on SCr of 1.4 mg/dL).    Diagnostic Results:  I have reviewed all pertinent imaging results/findings within the past 24 hours.

## 2025-01-19 NOTE — SUBJECTIVE & OBJECTIVE
Interval History: seen by ophthalmology, no concern for infectious cause of blurry vision. Paracentesis not consistent with SBP. He feels overall improved. Consistently states he did take his PO suppressive antibiotics and denies any missed doses.     Review of Systems   Eyes:  Positive for visual disturbance (improving with eye drops).   Gastrointestinal:  Positive for abdominal distention (improved) and abdominal pain (improved).   Skin: Negative.      Objective:     Vital Signs (Most Recent):  Temp: 98.2 °F (36.8 °C) (01/19/25 0742)  Pulse: 77 (01/19/25 0757)  Resp: 19 (01/19/25 0742)  BP: (!) 90/0 (01/19/25 0900)  SpO2: 100 % (01/19/25 0345) Vital Signs (24h Range):  Temp:  [97.6 °F (36.4 °C)-98.4 °F (36.9 °C)] 98.2 °F (36.8 °C)  Pulse:  [61-87] 77  Resp:  [18-19] 19  SpO2:  [91 %-100 %] 100 %  BP: ()/(0-49) 90/0     Weight: 62.7 kg (138 lb 3.7 oz)  Body mass index is 17.28 kg/m².    Estimated Creatinine Clearance: 62.8 mL/min (based on SCr of 1.4 mg/dL).     Physical Exam  Vitals reviewed.   Constitutional:       Appearance: Normal appearance.   HENT:      Head: Normocephalic.      Mouth/Throat:      Mouth: Mucous membranes are moist.      Pharynx: No oropharyngeal exudate.   Pulmonary:      Effort: Pulmonary effort is normal. No respiratory distress.   Abdominal:      General: There is distension.      Tenderness: There is no abdominal tenderness.      Comments: Minimal tenderness along driveline   Skin:     General: Skin is warm and dry.   Neurological:      Mental Status: He is alert.   Psychiatric:         Mood and Affect: Mood normal.         Behavior: Behavior normal.          Significant Labs:   Microbiology Results (last 7 days)       Procedure Component Value Units Date/Time    Blood culture [[id number]] Collected: 01/19/25 0318    Order Status: Sent Specimen: Blood Updated: 01/19/25 0400    Blood culture [[id number]] Collected: 01/19/25 0318    Order Status: Sent Specimen: Blood Updated:  01/19/25 0400    (rule out SBP) Gram stain [[id number]] Collected: 01/18/25 1454    Order Status: Completed Specimen: Ascites Updated: 01/18/25 2124     Gram Stain Result No WBC's      No organisms seen    Narrative:      To rule out SBP order labs: Aerobic culture [[id number]],  Culture, Anaerobic [VTK537], Gram stain [UEJ670], Albumin  [ATX897], Protein [JQI524], LDH [IUH545], WBC \T\ Dff  [TWL0188].    Fungus culture [[id number]] Collected: 01/18/25 1454    Order Status: Sent Specimen: Ascites Updated: 01/18/25 1531    (rule out SBP) Aerobic culture [[id number]] Collected: 01/18/25 1454    Order Status: Sent Specimen: Ascites Updated: 01/18/25 1531    (rule out SBP) Culture, Anaerobic [[id number]] Collected: 01/18/25 1454    Order Status: Sent Specimen: Ascites Updated: 01/18/25 1530    Blood culture [[id number]] Collected: 01/16/25 1006    Order Status: Completed Specimen: Blood Updated: 01/18/25 1212     Blood Culture, Routine No Growth to date      No Growth to date      No Growth to date    Blood culture [[id number]] Collected: 01/16/25 1006    Order Status: Completed Specimen: Blood Updated: 01/18/25 1212     Blood Culture, Routine No Growth to date      No Growth to date      No Growth to date    Gram stain [[id number]]     Order Status: Canceled Specimen: Body Fluid     Anaerobic culture [[id number]]     Order Status: Canceled Specimen: Body Fluid     Aerobic culture [[id number]]     Order Status: Canceled Specimen: Peritoneal Fluid     MRSA Screen by PCR [[id number]] Collected: 01/15/25 1630    Order Status: Completed Specimen: Nasal Swab Updated: 01/15/25 1852     MRSA SCREEN BY PCR Not Detected    Culture, Anaerobe [[id number]]     Order Status: Canceled Specimen: Body Fluid from Abdomen     Aerobic culture [[id number]]     Order Status: Canceled Specimen: Incision site             Significant Imaging: I have reviewed all pertinent imaging results/findings within the past 24 hours.

## 2025-01-19 NOTE — PROGRESS NOTES
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 Blood cultures with staph aureus (MSSA on BCID), susceptibilities pending. Repeat blood cultures no growth to date. Has new abdominal distention and ascites with pain over DLES. CT A/P without abscess, but does have ascites. HIV, Hep B and C negative. No signs of phlebitis from recent admission. He affirms he has been taking his suppressive cefadroxil with no missed doses, however he has a history of non-adherence. Has a headache and some blurry vision. No neck pain or joint pain. No indwelling hardware. He denies injection drug use. Paracentesis without evidence of peritonitis.     Recommendations  - continue cefazolin 2 grams q8 hours  - repeat blood cultures ordered  - TTE pending   - unlikely to be an OPAT candidate         Above discussed with primary team.     Time: 50 minutes   50% of time spent on face-to-face counseling and coordination of care. Counseling included review of test results, diagnosis, and treatment plan with patient and/or family.  I have reviewed hospital notes from HM service and other specialty providers as well as outside medical records. I have also reviewed CBC, CMP/BMP,  cultures and imaging with my interpretation as documented. Patient is high risk of morbidity, on antibiotics requiring intensive monitoring for toxicity.     Anticipated Disposition: TBD    Thank you for your consult. I will follow-up with  patient. Please contact us if you have any additional questions.    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 He developed nausea and abdominal pain acutely after eating. States he also developed abdominal distention and pain since his recent discharge on 1/9. He states he has been consistently taking his cefadroxil and denies missed doses. He states his DLES is unchanged, has some purulent discharged and pain, but not significantly worse than usual. Has a headache and some blurry vision. No neck pain or joint pain. No indwelling hardware. He denies injection drug use. He denies having painful IV sites during/after his recent admission.   Interval History: seen by ophthalmology, no concern for infectious cause of blurry vision. Paracentesis not consistent with SBP. He feels overall improved. Consistently states he did take his PO suppressive antibiotics and denies any missed doses.     Review of Systems   Eyes:  Positive for visual disturbance (improving with eye drops).   Gastrointestinal:  Positive for abdominal distention (improved) and abdominal pain (improved).   Skin: Negative.      Objective:     Vital Signs (Most Recent):  Temp: 98.2 °F (36.8 °C) (01/19/25 0742)  Pulse: 77 (01/19/25 0757)  Resp: 19 (01/19/25 0742)  BP: (!) 90/0 (01/19/25 0900)  SpO2: 100 % (01/19/25 0345) Vital Signs (24h Range):  Temp:  [97.6 °F (36.4 °C)-98.4 °F (36.9 °C)] 98.2 °F (36.8 °C)  Pulse:  [61-87] 77  Resp:  [18-19] 19  SpO2:  [91 %-100 %] 100 %  BP: ()/(0-49) 90/0     Weight: 62.7 kg (138 lb 3.7 oz)  Body mass index is 17.28 kg/m².    Estimated Creatinine  Clearance: 62.8 mL/min (based on SCr of 1.4 mg/dL).     Physical Exam  Vitals reviewed.   Constitutional:       Appearance: Normal appearance.   HENT:      Head: Normocephalic.      Mouth/Throat:      Mouth: Mucous membranes are moist.      Pharynx: No oropharyngeal exudate.   Pulmonary:      Effort: Pulmonary effort is normal. No respiratory distress.   Abdominal:      General: There is distension.      Tenderness: There is no abdominal tenderness.      Comments: Minimal tenderness along driveline   Skin:     General: Skin is warm and dry.   Neurological:      Mental Status: He is alert.   Psychiatric:         Mood and Affect: Mood normal.         Behavior: Behavior normal.          Significant Labs:   Microbiology Results (last 7 days)       Procedure Component Value Units Date/Time    Blood culture [[id number]] Collected: 01/19/25 0318    Order Status: Sent Specimen: Blood Updated: 01/19/25 0400    Blood culture [[id number]] Collected: 01/19/25 0318    Order Status: Sent Specimen: Blood Updated: 01/19/25 0400    (rule out SBP) Gram stain [[id number]] Collected: 01/18/25 1454    Order Status: Completed Specimen: Ascites Updated: 01/18/25 2124     Gram Stain Result No WBC's      No organisms seen    Narrative:      To rule out SBP order labs: Aerobic culture [[id number]],  Culture, Anaerobic [BHF088], Gram stain [RRF250], Albumin  [PEE213], Protein [TBN401], LDH [VKY874], WBC \T\ Dff  [TUU1381].    Fungus culture [[id number]] Collected: 01/18/25 1454    Order Status: Sent Specimen: Ascites Updated: 01/18/25 1531    (rule out SBP) Aerobic culture [[id number]] Collected: 01/18/25 1454    Order Status: Sent Specimen: Ascites Updated: 01/18/25 1531    (rule out SBP) Culture, Anaerobic [[id number]] Collected: 01/18/25 1454    Order Status: Sent Specimen: Ascites Updated: 01/18/25 1530    Blood culture [[id number]] Collected: 01/16/25 1006    Order Status: Completed Specimen: Blood Updated: 01/18/25 1212     Blood Culture,  Routine No Growth to date      No Growth to date      No Growth to date    Blood culture [[id number]] Collected: 01/16/25 1006    Order Status: Completed Specimen: Blood Updated: 01/18/25 1212     Blood Culture, Routine No Growth to date      No Growth to date      No Growth to date    Gram stain [[id number]]     Order Status: Canceled Specimen: Body Fluid     Anaerobic culture [[id number]]     Order Status: Canceled Specimen: Body Fluid     Aerobic culture [[id number]]     Order Status: Canceled Specimen: Peritoneal Fluid     MRSA Screen by PCR [[id number]] Collected: 01/15/25 1630    Order Status: Completed Specimen: Nasal Swab Updated: 01/15/25 1852     MRSA SCREEN BY PCR Not Detected    Culture, Anaerobe [[id number]]     Order Status: Canceled Specimen: Body Fluid from Abdomen     Aerobic culture [[id number]]     Order Status: Canceled Specimen: Incision site             Significant Imaging: I have reviewed all pertinent imaging results/findings within the past 24 hours.

## 2025-01-19 NOTE — PROGRESS NOTES
01/19/2025  Ruma Li    Current provider:  Josh Ryan, *    Device interrogation:      1/19/2025     9:00 AM 1/19/2025     3:44 AM 1/18/2025    11:56 PM 1/18/2025     7:30 PM 1/18/2025     3:24 PM 1/18/2025    11:22 AM 1/18/2025     7:19 AM   TXP LVAD INTERROGATIONS   Type HeartMate3 HeartMate3 HeartMate3 HeartMate3  HeartMate3 HeartMate3   Flow 3.7 3.8 3.7 3.8 3.7 4.1 3.7   Speed 5100 5100 5100 5150 5150 5100 5150   PI 5.9 6.7 6.6 6.3 6.5 4.6 6.6   Power (Serna) 3.6 3.6 3.7 3.7 3.7 3.6 3.6   LSL     4700 4700 4700   Pulsatility Intermittent pulse Intermittent pulse Intermittent pulse Intermittent pulse Intermittent pulse Intermittent pulse No Pulse          Rounded on Kevan Queen to ensure all mechanical assist device settings (IABP or VAD) were appropriate and all parameters were within limits.  I was able to ensure all back up equipment was present, the staff had no issues, and the Perfusion Department daily rounding was complete.      For implantable VADs: Interrogation of Ventricular assist device was performed with analysis of device parameters and review of device function. I have personally reviewed the interrogation findings and agree with findings as stated.     In emergency, the nursing units have been notified to contact the perfusion department either by:  Calling [id number] from 630am to 4pm Mon thru Fri, utilizing the On-Call Finder functionality of Epic and searching for Perfusion, or by contacting the hospital  from 4pm to 630am and on weekends and asking to speak with the perfusionist on call.    11:13 AM

## 2025-01-19 NOTE — ASSESSMENT & PLAN NOTE
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 Blood cultures with staph aureus (MSSA on BCID), susceptibilities pending. Repeat blood cultures no growth to date. Has new abdominal distention and ascites with pain over DLES. CT A/P without abscess, but does have ascites. HIV, Hep B and C negative. No signs of phlebitis from recent admission. He affirms he has been taking his suppressive cefadroxil with no missed doses, however he has a history of non-adherence. Has a headache and some blurry vision. No neck pain or joint pain. No indwelling hardware. He denies injection drug use. Paracentesis without evidence of peritonitis.     Recommendations  - continue cefazolin 2 grams q8 hours  - repeat blood cultures ordered  - TTE pending   - unlikely to be an OPAT candidate

## 2025-01-19 NOTE — PROGRESS NOTES
01/18/2025  Ruma Li    Current provider:  Josh Ryan, *    Device interrogation:      1/18/2025     7:30 PM 1/18/2025     3:24 PM 1/18/2025    11:22 AM 1/18/2025     7:19 AM 1/18/2025     4:03 AM 1/17/2025    11:26 PM 1/17/2025     8:05 PM   TXP LVAD INTERROGATIONS   Type HeartMate3  HeartMate3 HeartMate3 HeartMate3 HeartMate3 HeartMate3   Flow 3.8 3.7 4.1 3.7 3.9 4.1 3.7   Speed 5150 5150 5100 5150 5000 5000 5000   PI 6.3 6.5 4.6 6.6 5.8 5.1 6.7   Power (Serna) 3.7 3.7 3.6 3.6 3.6 3.8 3.6   LSL  4700 4700 4700 4600 4600 4600   Pulsatility Intermittent pulse Intermittent pulse Intermittent pulse No Pulse Intermittent pulse Intermittent pulse No Pulse          Rounded on Kevan Queen to ensure all mechanical assist device settings (IABP or VAD) were appropriate and all parameters were within limits.  I was able to ensure all back up equipment was present, the staff had no issues, and the Perfusion Department daily rounding was complete.      For implantable VADs: Interrogation of Ventricular assist device was performed with analysis of device parameters and review of device function. I have personally reviewed the interrogation findings and agree with findings as stated.     In emergency, the nursing units have been notified to contact the perfusion department either by:  Calling [phone] from 630am to 4pm Mon thru Fri, utilizing the On-Call Finder functionality of Epic and searching for Perfusion, or by contacting the hospital  from 4pm to 630am and on weekends and asking to speak with the perfusionist on call.    9:27 PM

## 2025-01-19 NOTE — PLAN OF CARE
Plan of care discussed with patient. DP elevated throughout night, PRN hydralazine given. IV abx infusing per order. Eye drops given for blurriness/dry eyes. No abdominal distention or dependent edema noted. Paracentesis drsg CDI. BG monitored. K repleted with scheduled replacement. Patient ambulating independently, fall precautions in place. No LVAD alarms noted, smooth LVAD hum. Discussed medications and care.  Pt denies new complaints.    Problem: Adult Inpatient Plan of Care  Goal: Plan of Care Review  Outcome: Progressing  Goal: Patient-Specific Goal (Individualized)  Outcome: Progressing     Problem: Diabetes Comorbidity  Goal: Blood Glucose Level Within Targeted Range  Outcome: Progressing     Problem: Sepsis/Septic Shock  Goal: Blood Glucose Level Within Targeted Range  Outcome: Progressing  Goal: Absence of Infection Signs and Symptoms  Outcome: Progressing  Goal: Optimal Nutrition Intake  Outcome: Progressing     Problem: Ventricular Assist Device  Goal: Absence of Bleeding  Outcome: Progressing  Goal: Absence of Embolism Signs and Symptoms  Outcome: Progressing  Goal: Optimal Blood Flow  Outcome: Progressing  Goal: Absence of Infection Signs and Symptoms  Outcome: Progressing  Goal: Effective Right-Sided Heart Function  Outcome: Progressing     Problem: Fall Injury Risk  Goal: Absence of Fall and Fall-Related Injury  Outcome: Progressing     Problem: Heart Failure  Goal: Stable Heart Rate and Rhythm  Outcome: Progressing  Goal: Optimal Functional Ability  Outcome: Progressing  Goal: Fluid and Electrolyte Balance  Outcome: Progressing  Goal: Improved Oral Intake  Outcome: Progressing  Goal: Effective Oxygenation and Ventilation  Outcome: Progressing

## 2025-01-19 NOTE — ASSESSMENT & PLAN NOTE
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 Therapeutic today.  Previous home regimen 1.5mg Qdaily  -Antiplatelets ASA 81 mg, on hold with GIB  -LDH is stable overall today. Will continue to monitor daily.  -Speed set at 5100  -Interrogation notable for no events  -Not listed for OHTx    Procedure: Device Interrogation Including analysis of device parameters  Current Settings: Ventricular Assist Device        1/19/2025     4:09 PM 1/19/2025     1:00 PM 1/19/2025     9:00 AM 1/19/2025     3:44 AM 1/18/2025    11:56 PM 1/18/2025     7:30 PM 1/18/2025     3:24 PM   TXP LVAD INTERROGATIONS   Type HeartMate3 HeartMate3 HeartMate3 HeartMate3 HeartMate3 HeartMate3    Flow 4.2 3.7 3.7 3.8 3.7 3.8 3.7   Speed 5100 5100 5100 5100 5100 5150 5150   PI 5.2 5.1 5.9 6.7 6.6 6.3 6.5   Power (Serna) 3.6 4.9 3.6 3.6 3.7 3.7 3.7   LSL 4700      4700   Pulsatility Intermittent pulse Intermittent pulse Intermittent pulse Intermittent pulse Intermittent pulse Intermittent pulse Intermittent pulse

## 2025-01-20 LAB
ALBUMIN SERPL BCP-MCNC: 2.8 G/DL (ref 3.5–5.2)
ALP SERPL-CCNC: 197 U/L (ref 40–150)
ALT SERPL W/O P-5'-P-CCNC: 6 U/L (ref 10–44)
ANION GAP SERPL CALC-SCNC: 13 MMOL/L (ref 8–16)
ANION GAP SERPL CALC-SCNC: 13 MMOL/L (ref 8–16)
ANION GAP SERPL CALC-SCNC: 15 MMOL/L (ref 8–16)
APTT PPP: 33.3 SEC (ref 21–32)
AST SERPL-CCNC: 27 U/L (ref 10–40)
BACTERIA BLD CULT: ABNORMAL
BASOPHILS # BLD AUTO: 0.04 K/UL (ref 0–0.2)
BASOPHILS NFR BLD: 0.5 % (ref 0–1.9)
BILIRUB DIRECT SERPL-MCNC: 1 MG/DL (ref 0.1–0.3)
BILIRUB SERPL-MCNC: 1.4 MG/DL (ref 0.1–1)
BNP SERPL-MCNC: 526 PG/ML (ref 0–99)
BUN SERPL-MCNC: 31 MG/DL (ref 6–20)
BUN SERPL-MCNC: 33 MG/DL (ref 6–20)
BUN SERPL-MCNC: 36 MG/DL (ref 6–20)
CALCIUM SERPL-MCNC: 9.1 MG/DL (ref 8.7–10.5)
CALCIUM SERPL-MCNC: 9.1 MG/DL (ref 8.7–10.5)
CALCIUM SERPL-MCNC: 9.2 MG/DL (ref 8.7–10.5)
CHLORIDE SERPL-SCNC: 86 MMOL/L (ref 95–110)
CHLORIDE SERPL-SCNC: 88 MMOL/L (ref 95–110)
CHLORIDE SERPL-SCNC: 89 MMOL/L (ref 95–110)
CO2 SERPL-SCNC: 26 MMOL/L (ref 23–29)
CO2 SERPL-SCNC: 28 MMOL/L (ref 23–29)
CO2 SERPL-SCNC: 30 MMOL/L (ref 23–29)
CREAT SERPL-MCNC: 1.5 MG/DL (ref 0.5–1.4)
CREAT SERPL-MCNC: 1.6 MG/DL (ref 0.5–1.4)
CREAT SERPL-MCNC: 1.7 MG/DL (ref 0.5–1.4)
CRP SERPL-MCNC: 19.6 MG/L (ref 0–8.2)
DIFFERENTIAL METHOD BLD: ABNORMAL
EOSINOPHIL # BLD AUTO: 0 K/UL (ref 0–0.5)
EOSINOPHIL NFR BLD: 0.3 % (ref 0–8)
ERYTHROCYTE [DISTWIDTH] IN BLOOD BY AUTOMATED COUNT: 24.1 % (ref 11.5–14.5)
EST. GFR  (NO RACE VARIABLE): 51.9 ML/MIN/1.73 M^2
EST. GFR  (NO RACE VARIABLE): 55.9 ML/MIN/1.73 M^2
EST. GFR  (NO RACE VARIABLE): >60 ML/MIN/1.73 M^2
GLUCOSE SERPL-MCNC: 123 MG/DL (ref 70–110)
GLUCOSE SERPL-MCNC: 174 MG/DL (ref 70–110)
GLUCOSE SERPL-MCNC: 198 MG/DL (ref 70–110)
GRAM STN SPEC: ABNORMAL
HCT VFR BLD AUTO: 33.4 % (ref 40–54)
HGB BLD-MCNC: 9.9 G/DL (ref 14–18)
IMM GRANULOCYTES # BLD AUTO: 0.04 K/UL (ref 0–0.04)
IMM GRANULOCYTES NFR BLD AUTO: 0.5 % (ref 0–0.5)
INR PPP: 2.2 (ref 0.8–1.2)
LDH SERPL L TO P-CCNC: 244 U/L (ref 110–260)
LYMPHOCYTES # BLD AUTO: 1 K/UL (ref 1–4.8)
LYMPHOCYTES NFR BLD: 11.9 % (ref 18–48)
MAGNESIUM SERPL-MCNC: 1.8 MG/DL (ref 1.6–2.6)
MCH RBC QN AUTO: 18.2 PG (ref 27–31)
MCHC RBC AUTO-ENTMCNC: 29.6 G/DL (ref 32–36)
MCV RBC AUTO: 61 FL (ref 82–98)
MONOCYTES # BLD AUTO: 0.7 K/UL (ref 0.3–1)
MONOCYTES NFR BLD: 7.8 % (ref 4–15)
NEUTROPHILS # BLD AUTO: 6.8 K/UL (ref 1.8–7.7)
NEUTROPHILS NFR BLD: 79 % (ref 38–73)
NRBC BLD-RTO: 0 /100 WBC
PHOSPHATE SERPL-MCNC: 4.1 MG/DL (ref 2.7–4.5)
PLATELET # BLD AUTO: 314 K/UL (ref 150–450)
PMV BLD AUTO: ABNORMAL FL (ref 9.2–12.9)
POCT GLUCOSE: 115 MG/DL (ref 70–110)
POCT GLUCOSE: 135 MG/DL (ref 70–110)
POCT GLUCOSE: 180 MG/DL (ref 70–110)
POCT GLUCOSE: 224 MG/DL (ref 70–110)
POTASSIUM SERPL-SCNC: 2.7 MMOL/L (ref 3.5–5.1)
POTASSIUM SERPL-SCNC: 3.3 MMOL/L (ref 3.5–5.1)
POTASSIUM SERPL-SCNC: 3.9 MMOL/L (ref 3.5–5.1)
PREALB SERPL-MCNC: 15 MG/DL (ref 20–43)
PROT SERPL-MCNC: 9.9 G/DL (ref 6–8.4)
PROTHROMBIN TIME: 23 SEC (ref 9–12.5)
RBC # BLD AUTO: 5.45 M/UL (ref 4.6–6.2)
SODIUM SERPL-SCNC: 128 MMOL/L (ref 136–145)
SODIUM SERPL-SCNC: 129 MMOL/L (ref 136–145)
SODIUM SERPL-SCNC: 131 MMOL/L (ref 136–145)
WBC # BLD AUTO: 8.64 K/UL (ref 3.9–12.7)

## 2025-01-20 PROCEDURE — 93750 INTERROGATION VAD IN PERSON: CPT | Mod: ,,, | Performed by: INTERNAL MEDICINE

## 2025-01-20 PROCEDURE — 99232 SBSQ HOSP IP/OBS MODERATE 35: CPT | Mod: ,,, | Performed by: NURSE PRACTITIONER

## 2025-01-20 PROCEDURE — 27000248 HC VAD-ADDITIONAL DAY

## 2025-01-20 PROCEDURE — 80048 BASIC METABOLIC PNL TOTAL CA: CPT | Performed by: NURSE PRACTITIONER

## 2025-01-20 PROCEDURE — 20600001 HC STEP DOWN PRIVATE ROOM

## 2025-01-20 PROCEDURE — 25000003 PHARM REV CODE 250: Performed by: STUDENT IN AN ORGANIZED HEALTH CARE EDUCATION/TRAINING PROGRAM

## 2025-01-20 PROCEDURE — 80076 HEPATIC FUNCTION PANEL: CPT | Performed by: NURSE PRACTITIONER

## 2025-01-20 PROCEDURE — 85730 THROMBOPLASTIN TIME PARTIAL: CPT | Performed by: STUDENT IN AN ORGANIZED HEALTH CARE EDUCATION/TRAINING PROGRAM

## 2025-01-20 PROCEDURE — 25000003 PHARM REV CODE 250: Performed by: NURSE PRACTITIONER

## 2025-01-20 PROCEDURE — 84100 ASSAY OF PHOSPHORUS: CPT | Performed by: STUDENT IN AN ORGANIZED HEALTH CARE EDUCATION/TRAINING PROGRAM

## 2025-01-20 PROCEDURE — 80048 BASIC METABOLIC PNL TOTAL CA: CPT | Mod: 91 | Performed by: STUDENT IN AN ORGANIZED HEALTH CARE EDUCATION/TRAINING PROGRAM

## 2025-01-20 PROCEDURE — 25000003 PHARM REV CODE 250: Performed by: INTERNAL MEDICINE

## 2025-01-20 PROCEDURE — 83735 ASSAY OF MAGNESIUM: CPT | Performed by: NURSE PRACTITIONER

## 2025-01-20 PROCEDURE — 99233 SBSQ HOSP IP/OBS HIGH 50: CPT | Mod: 25,,, | Performed by: INTERNAL MEDICINE

## 2025-01-20 PROCEDURE — 36415 COLL VENOUS BLD VENIPUNCTURE: CPT | Performed by: STUDENT IN AN ORGANIZED HEALTH CARE EDUCATION/TRAINING PROGRAM

## 2025-01-20 PROCEDURE — 85610 PROTHROMBIN TIME: CPT | Performed by: NURSE PRACTITIONER

## 2025-01-20 PROCEDURE — 99233 SBSQ HOSP IP/OBS HIGH 50: CPT | Mod: ,,, | Performed by: STUDENT IN AN ORGANIZED HEALTH CARE EDUCATION/TRAINING PROGRAM

## 2025-01-20 PROCEDURE — 25000003 PHARM REV CODE 250

## 2025-01-20 PROCEDURE — 85025 COMPLETE CBC W/AUTO DIFF WBC: CPT | Performed by: NURSE PRACTITIONER

## 2025-01-20 PROCEDURE — 83615 LACTATE (LD) (LDH) ENZYME: CPT | Performed by: STUDENT IN AN ORGANIZED HEALTH CARE EDUCATION/TRAINING PROGRAM

## 2025-01-20 PROCEDURE — 86140 C-REACTIVE PROTEIN: CPT | Performed by: STUDENT IN AN ORGANIZED HEALTH CARE EDUCATION/TRAINING PROGRAM

## 2025-01-20 PROCEDURE — 63600175 PHARM REV CODE 636 W HCPCS: Performed by: INTERNAL MEDICINE

## 2025-01-20 PROCEDURE — 84134 ASSAY OF PREALBUMIN: CPT | Performed by: STUDENT IN AN ORGANIZED HEALTH CARE EDUCATION/TRAINING PROGRAM

## 2025-01-20 PROCEDURE — 83880 ASSAY OF NATRIURETIC PEPTIDE: CPT | Performed by: STUDENT IN AN ORGANIZED HEALTH CARE EDUCATION/TRAINING PROGRAM

## 2025-01-20 RX ORDER — TALC
6 POWDER (GRAM) TOPICAL NIGHTLY PRN
Status: DISCONTINUED | OUTPATIENT
Start: 2025-01-20 | End: 2025-01-25 | Stop reason: HOSPADM

## 2025-01-20 RX ORDER — POTASSIUM CHLORIDE 20 MEQ/1
20 TABLET, EXTENDED RELEASE ORAL 2 TIMES DAILY
Status: DISCONTINUED | OUTPATIENT
Start: 2025-01-20 | End: 2025-01-22

## 2025-01-20 RX ORDER — POTASSIUM CHLORIDE 20 MEQ/1
60 TABLET, EXTENDED RELEASE ORAL ONCE
Status: COMPLETED | OUTPATIENT
Start: 2025-01-20 | End: 2025-01-20

## 2025-01-20 RX ORDER — LANOLIN ALCOHOL/MO/W.PET/CERES
800 CREAM (GRAM) TOPICAL 2 TIMES DAILY
Status: DISCONTINUED | OUTPATIENT
Start: 2025-01-20 | End: 2025-01-25 | Stop reason: HOSPADM

## 2025-01-20 RX ORDER — POTASSIUM CHLORIDE 20 MEQ/1
40 TABLET, EXTENDED RELEASE ORAL ONCE
Status: COMPLETED | OUTPATIENT
Start: 2025-01-20 | End: 2025-01-20

## 2025-01-20 RX ORDER — POTASSIUM CHLORIDE 20 MEQ/1
40 TABLET, EXTENDED RELEASE ORAL
Status: COMPLETED | OUTPATIENT
Start: 2025-01-20 | End: 2025-01-20

## 2025-01-20 RX ADMIN — INSULIN ASPART 10 UNITS: 100 INJECTION, SOLUTION INTRAVENOUS; SUBCUTANEOUS at 05:01

## 2025-01-20 RX ADMIN — EPLERENONE 50 MG: 25 TABLET, FILM COATED ORAL at 08:01

## 2025-01-20 RX ADMIN — INSULIN GLARGINE 10 UNITS: 100 INJECTION, SOLUTION SUBCUTANEOUS at 09:01

## 2025-01-20 RX ADMIN — Medication 6 MG: at 12:01

## 2025-01-20 RX ADMIN — DEXTROSE MONOHYDRATE 6 G: 25000 INJECTION, SOLUTION INTRAVENOUS at 04:01

## 2025-01-20 RX ADMIN — POLYETHYLENE GLYCOL 3350 17 G: 17 POWDER, FOR SOLUTION ORAL at 08:01

## 2025-01-20 RX ADMIN — METHOCARBAMOL 500 MG: 500 TABLET ORAL at 08:01

## 2025-01-20 RX ADMIN — WARFARIN SODIUM 1.5 MG: 1 TABLET ORAL at 05:01

## 2025-01-20 RX ADMIN — POTASSIUM CHLORIDE 20 MEQ: 1500 TABLET, EXTENDED RELEASE ORAL at 08:01

## 2025-01-20 RX ADMIN — INSULIN ASPART 10 UNITS: 100 INJECTION, SOLUTION INTRAVENOUS; SUBCUTANEOUS at 01:01

## 2025-01-20 RX ADMIN — TORSEMIDE 60 MG: 20 TABLET ORAL at 08:01

## 2025-01-20 RX ADMIN — Medication 800 MG: at 08:01

## 2025-01-20 RX ADMIN — LEVETIRACETAM 1500 MG: 500 TABLET, FILM COATED ORAL at 08:01

## 2025-01-20 RX ADMIN — HYPROMELLOSE 2910 1 DROP: 5 SOLUTION/ DROPS OPHTHALMIC at 08:01

## 2025-01-20 RX ADMIN — POTASSIUM CHLORIDE 60 MEQ: 1500 TABLET, EXTENDED RELEASE ORAL at 07:01

## 2025-01-20 RX ADMIN — POTASSIUM CHLORIDE 40 MEQ: 1500 TABLET, EXTENDED RELEASE ORAL at 04:01

## 2025-01-20 RX ADMIN — INSULIN ASPART 10 UNITS: 100 INJECTION, SOLUTION INTRAVENOUS; SUBCUTANEOUS at 09:01

## 2025-01-20 RX ADMIN — METHOCARBAMOL 500 MG: 500 TABLET ORAL at 12:01

## 2025-01-20 RX ADMIN — HYPROMELLOSE 2910 1 DROP: 5 SOLUTION/ DROPS OPHTHALMIC at 11:01

## 2025-01-20 RX ADMIN — GABAPENTIN 100 MG: 100 CAPSULE ORAL at 08:01

## 2025-01-20 RX ADMIN — LEVETIRACETAM 1500 MG: 500 TABLET, FILM COATED ORAL at 09:01

## 2025-01-20 RX ADMIN — AMIODARONE HYDROCHLORIDE 200 MG: 200 TABLET ORAL at 08:01

## 2025-01-20 RX ADMIN — METHOCARBAMOL 500 MG: 500 TABLET ORAL at 04:01

## 2025-01-20 RX ADMIN — Medication 400 MG: at 08:01

## 2025-01-20 RX ADMIN — DULOXETINE HYDROCHLORIDE 30 MG: 30 CAPSULE, DELAYED RELEASE ORAL at 08:01

## 2025-01-20 RX ADMIN — POTASSIUM CHLORIDE 40 MEQ: 1500 TABLET, EXTENDED RELEASE ORAL at 09:01

## 2025-01-20 RX ADMIN — ATORVASTATIN CALCIUM 40 MG: 40 TABLET, FILM COATED ORAL at 08:01

## 2025-01-20 RX ADMIN — GABAPENTIN 400 MG: 400 CAPSULE ORAL at 08:01

## 2025-01-20 RX ADMIN — EMPAGLIFLOZIN 10 MG: 10 TABLET, FILM COATED ORAL at 08:01

## 2025-01-20 RX ADMIN — TORSEMIDE 60 MG: 20 TABLET ORAL at 04:01

## 2025-01-20 RX ADMIN — POTASSIUM CHLORIDE 40 MEQ: 1500 TABLET, EXTENDED RELEASE ORAL at 11:01

## 2025-01-20 RX ADMIN — HYPROMELLOSE 2910 1 DROP: 5 SOLUTION/ DROPS OPHTHALMIC at 04:01

## 2025-01-20 RX ADMIN — ASPIRIN 81 MG: 81 TABLET, COATED ORAL at 08:01

## 2025-01-20 RX ADMIN — PANTOPRAZOLE SODIUM 40 MG: 40 TABLET, DELAYED RELEASE ORAL at 08:01

## 2025-01-20 RX ADMIN — AMLODIPINE BESYLATE 10 MG: 10 TABLET ORAL at 08:01

## 2025-01-20 NOTE — ASSESSMENT & PLAN NOTE
-NICM  -Last 2D Echo 1/19/25 with EF 10 -15%. G1 DD. RV systolic function severely reduced. Mod to severe TR. PASP  31 mmHg. IVC/SVC: 15 mmHg.  - Previous Echo 8/26/24 : LVEF 5-10%, LVEDD  7.2 cm  -S/p diuresis with Lasix gtt.  Now on Torsemide 60mg BID - I/O: negative 3042 mL for yesterday.   -GDMT with Eplerenone  -2g Na dietary restriction, 1500 mL fluid restriction, strict I/Os

## 2025-01-20 NOTE — SUBJECTIVE & OBJECTIVE
"Interval HPI:   Overnight events: No acute events overnight. Patient in room [id number]/[id number] A. Blood glucose stable. BG at goal on current insulin regimen (SSI, prandial, and basal insulin ). Steroid use- None .    Renal function- Abnormal - Creatinine 1.5.    Vasopressors-  None       Endocrine will continue to follow and manage insulin orders inpatient.         Diet Cardiac Low Sodium,2gm, Double Portions; Fluid - 2000mL     Eatin%  Nausea: No  Hypoglycemia and intervention: No  Fever: No  TPN and/or TF: No  If yes, type of TF/TPN and rate: n/a    BP 90/71 (BP Location: Left arm, Patient Position: Lying)   Pulse 80   Temp 97.7 °F (36.5 °C) (Axillary)   Resp 18   Ht 6' 3" (1.905 m)   Wt 58.7 kg (129 lb 6.6 oz)   SpO2 97%   BMI 16.18 kg/m²     Labs Reviewed and Include    Recent Labs   Lab 25  0527   *   CALCIUM 9.1   ALBUMIN 2.8*   PROT 9.9*   *   K 2.7*   CO2 30*   CL 86*   BUN 31*   CREATININE 1.5*   ALKPHOS 197*   ALT 6*   AST 27   BILITOT 1.4*     Lab Results   Component Value Date    WBC 8.64 2025    HGB 9.9 (L) 2025    HCT 33.4 (L) 2025    MCV 61 (L) 2025     2025     No results for input(s): "TSH", "FREET4" in the last 168 hours.  Lab Results   Component Value Date    HGBA1C 10.6 (H) 2024       Nutritional status:   Body mass index is 16.18 kg/m².  Lab Results   Component Value Date    ALBUMIN 2.8 (L) 2025    ALBUMIN 2.5 (L) 2025    ALBUMIN 2.8 (L) 2025     Lab Results   Component Value Date    PREALBUMIN 15 (L) 2025    PREALBUMIN 12 (L) 2025    PREALBUMIN 10 (L) 01/15/2025       Estimated Creatinine Clearance: 54.9 mL/min (A) (based on SCr of 1.5 mg/dL (H)).    Accu-Checks  Recent Labs     25  0857 25  1222 25  1702 25  2212 25  0643 25  0825 25  1243 25  1822 25  2132 25  0907   POCTGLUCOSE 146* 223* 125* 150* 168* 153* 157* 120* 148* 180* "       Current Medications and/or Treatments Impacting Glycemic Control  Immunotherapy:    Immunosuppressants       None          Steroids:   Hormones (From admission, onward)      Start     Stop Route Frequency Ordered    01/20/25 0147  melatonin tablet 6 mg         -- Oral Nightly PRN 01/20/25 0048          Pressors:    Autonomic Drugs (From admission, onward)      None          Hyperglycemia/Diabetes Medications:   Antihyperglycemics (From admission, onward)      Start     Stop Route Frequency Ordered    01/19/25 1215  insulin aspart U-100 pen 10 Units         -- SubQ 3 times daily with meals 01/19/25 1106    01/19/25 1206  insulin aspart U-100 pen 0-5 Units         -- SubQ Before meals & nightly PRN 01/19/25 1106    01/15/25 0900  empagliflozin (Jardiance) tablet 10 mg        Question Answer Comment   Does this patient have a diagnosis of heart failure? Yes    Does this patient have type 1 diabetes or diabetic ketoacidosis? No    Does this patient have symptomatic hypotension? No    Is the patient NPO or pending major surgery in next 3 days or less? No        -- Oral Daily 01/15/25 0312    01/15/25 0900  insulin glargine U-100 (Lantus) pen 10 Units         -- SubQ Daily 01/15/25 0312

## 2025-01-20 NOTE — PROGRESS NOTES
"[facility] - Cardiac Intensive [facility]  Endocrinology  Progress Note    Admit Date: 1/15/2025     Reason for Consult: Management of T2DM, Hyperglycemia      Surgical Procedure and Date: LVAD 2022      Diabetes diagnosis year:      Home Diabetes Medications:    - Lantus 20 units daily  - Novolog SSI with meals   - Novolog SSI  150 - 200 + 2 unit    201 - 250 + 4 units    251 - 300 + 6 units    301 - 350 + 8 units       > 350   + 10 units  - Jardiance 10 mg       How often checking glucose at home - 3x daily   BG readings on regimen: " pt does not recall"  Hypoglycemia on the regimen? No  Missed doses on regimen?  No     Diabetes Complications include:   Hyperglycemia     Complicating diabetes co morbidities:   History of MI, CHF, and CKD        HPI:   Patient is a 39 y.o. 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 He underwent RVAD removal and chest closure 2022. He is off inotropes. He presents as a transfer from OSH for sepsis.  Endocrine consulted for bg management.    Interval HPI:   Overnight events: No acute events overnight. Patient in room [id number]/[id number] A. Blood glucose stable. BG at goal on current insulin regimen (SSI, prandial, and basal insulin ). Steroid use- None .    Renal function- Abnormal - Creatinine 1.5.    Vasopressors-  None       Endocrine will continue to follow and manage insulin orders inpatient.         Diet Cardiac Low Sodium,2gm, Double Portions; Fluid - 2000mL     Eatin%  Nausea: No  Hypoglycemia and intervention: No  Fever: No  TPN and/or TF: No  If yes, type of TF/TPN and rate: n/a    BP 90/71 (BP Location: Left arm, Patient Position: Lying)   Pulse 80   Temp 97.7 °F (36.5 °C) (Axillary)   Resp 18   Ht 6' 3" (1.905 m)   Wt 58.7 kg (129 lb 6.6 oz)   SpO2 97%   BMI " "16.18 kg/m²     Labs Reviewed and Include    Recent Labs   Lab 01/20/25  0527   *   CALCIUM 9.1   ALBUMIN 2.8*   PROT 9.9*   *   K 2.7*   CO2 30*   CL 86*   BUN 31*   CREATININE 1.5*   ALKPHOS 197*   ALT 6*   AST 27   BILITOT 1.4*     Lab Results   Component Value Date    WBC 8.64 01/20/2025    HGB 9.9 (L) 01/20/2025    HCT 33.4 (L) 01/20/2025    MCV 61 (L) 01/20/2025     01/20/2025     No results for input(s): "TSH", "FREET4" in the last 168 hours.  Lab Results   Component Value Date    HGBA1C 10.6 (H) 12/31/2024       Nutritional status:   Body mass index is 16.18 kg/m².  Lab Results   Component Value Date    ALBUMIN 2.8 (L) 01/20/2025    ALBUMIN 2.5 (L) 01/18/2025    ALBUMIN 2.8 (L) 01/17/2025     Lab Results   Component Value Date    PREALBUMIN 15 (L) 01/20/2025    PREALBUMIN 12 (L) 01/17/2025    PREALBUMIN 10 (L) 01/15/2025       Estimated Creatinine Clearance: 54.9 mL/min (A) (based on SCr of 1.5 mg/dL (H)).    Accu-Checks  Recent Labs     01/18/25  0857 01/18/25  1222 01/18/25  1702 01/18/25  2212 01/19/25  0643 01/19/25  0825 01/19/25  1243 01/19/25  1822 01/19/25  2132 01/20/25  0907   POCTGLUCOSE 146* 223* 125* 150* 168* 153* 157* 120* 148* 180*       Current Medications and/or Treatments Impacting Glycemic Control  Immunotherapy:    Immunosuppressants       None          Steroids:   Hormones (From admission, onward)      Start     Stop Route Frequency Ordered    01/20/25 0147  melatonin tablet 6 mg         -- Oral Nightly PRN 01/20/25 0048          Pressors:    Autonomic Drugs (From admission, onward)      None          Hyperglycemia/Diabetes Medications:   Antihyperglycemics (From admission, onward)      Start     Stop Route Frequency Ordered    01/19/25 1215  insulin aspart U-100 pen 10 Units         -- SubQ 3 times daily with meals 01/19/25 1106    01/19/25 1206  insulin aspart U-100 pen 0-5 Units         -- SubQ Before meals & nightly PRN 01/19/25 1106    01/15/25 0900  " empagliflozin (Jardiance) tablet 10 mg        Question Answer Comment   Does this patient have a diagnosis of heart failure? Yes    Does this patient have type 1 diabetes or diabetic ketoacidosis? No    Does this patient have symptomatic hypotension? No    Is the patient NPO or pending major surgery in next 3 days or less? No        -- Oral Daily 01/15/25 0312    01/15/25 0900  insulin glargine U-100 (Lantus) pen 10 Units         -- SubQ Daily 01/15/25 0312            ASSESSMENT and PLAN    Cardiac/Vascular  * Acute on chronic systolic CHF (congestive heart failure)  Managed per primary team  Optimize bg control      LVAD (left ventricular assist device) present  Managed per primary team  Optimize bg control        Endocrine  Type 2 diabetes mellitus with hyperglycemia  BG goal: 140-180  T2DM.    - Jardiance 10 mg daily (home medication; started per primary team)  -Continue Lantus 10 units once daily  -Continue Novolog 10 units TID with meals  -Continue Low Dose Correction Scale   - POCT Glucose before meals and at bedtime  - Hypoglycemia protocol in place      ** Please notify Endocrine for any change and/or advance in diet**  ** Please call Endocrine for any BG related issues **     Discharge Planning:   TBD. Please notify endocrinology prior to discharge.            Jody Starkey, NP  Endocrinology  [facility] - [facility]

## 2025-01-20 NOTE — NURSING
Pt's VAD dressing changed per protocol using kit and sterile technique. Pt's drive line site is clean, dry, intact with scant brown drainage at inner layer of old dressing; DLES is + (2). No kinks or frays on drive line, secured with melendez anchor. Pt tolerated dressing change well. Next dressing change due 01/21/2025.

## 2025-01-20 NOTE — SUBJECTIVE & OBJECTIVE
Interval History: NAOEN. Blood cultures growing staph. ID following and recommend to continue Ancef. TTE 1/19 with EF 10 -15%. G1 DD. RV systolic function severely reduced. Mod to severe TR. PASP  31 mmHg. IVC/SVC: 15 mmHg.    Cr stable - K repleted aggressively - remains on po torsemide 60mg BID.     Paracentesis without signs of SBP on labs, cultures pending. Had a Low flow alarm during rounds - Doppler 78. Exam with persistent JVD so continued diuresis.     Scheduled Meds:   amiodarone  200 mg Oral Daily    amLODIPine  10 mg Oral Daily    artificial tears  1 drop Both Eyes QID WAKE    aspirin  81 mg Oral Daily    atorvastatin  40 mg Oral Daily    ceFAZolin (Ancef) 6 g in D5W 500 mL CONTINUOUS INFUSION  6 g Intravenous Q24H    DULoxetine  30 mg Oral Daily    empagliflozin  10 mg Oral Daily    eplerenone  50 mg Oral Daily    gabapentin  100 mg Oral BID    And    gabapentin  400 mg Oral QHS    insulin aspart U-100  10 Units Subcutaneous TIDWM    insulin glargine U-100  10 Units Subcutaneous Daily    levETIRAcetam  1,500 mg Oral BID    magnesium oxide  800 mg Oral BID    methocarbamoL  500 mg Oral QID    pantoprazole  40 mg Oral Daily    polyethylene glycol  17 g Oral Daily    potassium chloride  20 mEq Oral BID    potassium chloride  40 mEq Oral 6 times per day    torsemide  60 mg Oral BID loop    warfarin  1.5 mg Oral Daily     PRN Meds:  Current Facility-Administered Medications:     acetaminophen, 650 mg, Oral, Q6H PRN    dextrose 50%, 12.5 g, Intravenous, PRN    dextrose 50%, 25 g, Intravenous, PRN    glucagon (human recombinant), 1 mg, Intramuscular, PRN    glucose, 16 g, Oral, PRN    glucose, 24 g, Oral, PRN    insulin aspart U-100, 0-5 Units, Subcutaneous, QID (AC + HS) PRN    melatonin, 6 mg, Oral, Nightly PRN    ondansetron, 4 mg, Oral, Q8H PRN    senna-docusate 8.6-50 mg, 1 tablet, Oral, Daily PRN    Review of patient's allergies indicates:   Allergen Reactions    Bumex [bumetanide] Hives     Objective:      Vital Signs (Most Recent):  Temp: 97.7 °F (36.5 °C) (01/20/25 0748)  Pulse: 82 (01/20/25 1037)  Resp: 18 (01/20/25 0843)  BP: 90/71 (01/20/25 0845)  SpO2: 97 % (01/20/25 0843) Vital Signs (24h Range):  Temp:  [97.5 °F (36.4 °C)-98.2 °F (36.8 °C)] 97.7 °F (36.5 °C)  Pulse:  [79-88] 82  Resp:  [16-19] 18  SpO2:  [97 %-100 %] 97 %  BP: ()/(0-79) 90/71     Patient Vitals for the past 72 hrs (Last 3 readings):   Weight   01/20/25 0745 58.7 kg (129 lb 6.6 oz)   01/19/25 0900 68 kg (150 lb)     Body mass index is 16.18 kg/m².      Intake/Output Summary (Last 24 hours) at 1/20/2025 1053  Last data filed at 1/20/2025 0900  Gross per 24 hour   Intake 2017.29 ml   Output 4200 ml   Net -2182.71 ml       Telemetry: NSR 90s       Physical Exam  Vitals and nursing note reviewed.   Constitutional:       Appearance: He is well-developed.   HENT:      Head: Normocephalic.   Eyes:      Pupils: Pupils are equal, round, and reactive to light.   Cardiovascular:      Rate and Rhythm: Normal rate and regular rhythm.      Heart sounds: Murmur heard.      Comments: VAD hum, elevated JVD  Pulmonary:      Effort: Pulmonary effort is normal.      Breath sounds: Normal breath sounds.   Abdominal:      General: Bowel sounds are normal. There is distension.      Palpations: Abdomen is soft.   Musculoskeletal:         General: Normal range of motion.      Cervical back: Normal range of motion and neck supple.      Right lower leg: No edema.      Left lower leg: No edema.   Skin:     General: Skin is warm and dry.   Neurological:      Mental Status: He is alert and oriented to person, place, and time.   Psychiatric:         Behavior: Behavior normal.            Significant Labs:  CBC:  Recent Labs   Lab 01/18/25  0348 01/18/25  1123 01/19/25  0318 01/20/25  0527   WBC 9.40 9.31 10.55 8.64   RBC 5.05  --  4.93 5.45   HGB 9.1*  --  8.9* 9.9*   HCT 30.7*  --  30.4* 33.4*     --  282 314   MCV 61*  --  62* 61*   MCH 18.0*  --  18.1*  18.2*   MCHC 29.6*  --  29.3* 29.6*     BNP:  Recent Labs   Lab 01/15/25  0436 01/17/25  0512 01/20/25 0527   BNP 1,157* 717* 526*     CMP:  Recent Labs   Lab 01/15/25  0740 01/16/25  0421 01/17/25  0512 01/17/25  1315 01/18/25  1122 01/18/25  1343 01/19/25 0318 01/20/25  0527 01/20/25  0955   *   < > 129*   < >  --    < > 142* 123* 198*   CALCIUM 8.3*   < > 9.6   < >  --    < > 9.1 9.1 9.2   ALBUMIN 2.2*  --  2.8*  --  2.5*  --   --  2.8*  --    PROT 7.9  --  10.4*  --  9.6*  --   --  9.9*  --    *   < > 132*   < >  --    < > 127* 129* 131*   K 3.4*   < > 3.0*   < >  --    < > 3.9 2.7* 3.3*   CO2 25   < > 30*   < >  --    < > 25 30* 28      < > 88*   < >  --    < > 92* 86* 88*   BUN 19   < > 23*   < >  --    < > 31* 31* 33*   CREATININE 1.3   < > 1.7*   < >  --    < > 1.4 1.5* 1.6*   ALKPHOS 141  --  193*  --  173*  --   --  197*  --    ALT 14  --  17  --   --   --   --  6*  --    AST 31  --  35  --   --   --   --  27  --    BILITOT 2.3*  --  1.9*  --   --   --   --  1.4*  --     < > = values in this interval not displayed.      Coagulation:   Recent Labs   Lab 01/18/25 0348 01/19/25 0318 01/20/25 0527   INR 2.2* 2.1* 2.2*   APTT 33.1* 33.3* 33.3*     LDH:  Recent Labs   Lab 01/18/25 0348 01/18/25  1123 01/19/25  0318 01/20/25  0527   * 238 269* 244     Microbiology:  Microbiology Results (last 7 days)       Procedure Component Value Units Date/Time    Blood culture [[id number]] Collected: 01/19/25 0318    Order Status: Completed Specimen: Blood Updated: 01/20/25 0812     Blood Culture, Routine No Growth to date      No Growth to date    Blood culture [[id number]] Collected: 01/19/25 0318    Order Status: Completed Specimen: Blood Updated: 01/20/25 0812     Blood Culture, Routine No Growth to date      No Growth to date    (rule out SBP) Culture, Anaerobic [[id number]] Collected: 01/18/25 1454    Order Status: Completed Specimen: Ascites Updated: 01/20/25 0651     Anaerobic Culture  Culture in progress    Narrative:      To rule out SBP order labs: Aerobic culture [AXX555],  Culture, Anaerobic [QIH967], Gram stain [OEV569], Albumin  [ITR657], Protein [EWR015], LDH [PRB618], WBC \T\ Dff  [YAB4038].    (rule out SBP) Aerobic culture [[id number]] Collected: 01/18/25 1454    Order Status: Completed Specimen: Ascites Updated: 01/20/25 0613     Aerobic Bacterial Culture No growth    Narrative:      To rule out SBP order labs: Aerobic culture [ZZW263],  Culture, Anaerobic [DKE282], Gram stain [DEV025], Albumin  [DHS384], Protein [HIE743], LDH [SBJ927], WBC \T\ Dff  [MIM7701].    Blood culture [[id number]] Collected: 01/16/25 1006    Order Status: Completed Specimen: Blood Updated: 01/19/25 1212     Blood Culture, Routine No Growth to date      No Growth to date      No Growth to date      No Growth to date    Blood culture [[id number]] Collected: 01/16/25 1006    Order Status: Completed Specimen: Blood Updated: 01/19/25 1212     Blood Culture, Routine No Growth to date      No Growth to date      No Growth to date      No Growth to date    (rule out SBP) Gram stain [[id number]] Collected: 01/18/25 1454    Order Status: Completed Specimen: Ascites Updated: 01/18/25 2124     Gram Stain Result No WBC's      No organisms seen    Narrative:      To rule out SBP order labs: Aerobic culture [LMV958],  Culture, Anaerobic [BIS014], Gram stain [SHU450], Albumin  [NHA729], Protein [AQY663], LDH [OYS237], WBC \T\ Dff  [LQT0628].    Fungus culture [[id number]] Collected: 01/18/25 1454    Order Status: Sent Specimen: Ascites Updated: 01/18/25 1531    Gram stain [[id number]]     Order Status: Canceled Specimen: Body Fluid     Anaerobic culture [[id number]]     Order Status: Canceled Specimen: Body Fluid     Aerobic culture [[id number]]     Order Status: Canceled Specimen: Peritoneal Fluid     MRSA Screen by PCR [[id number]] Collected: 01/15/25 1630    Order Status: Completed Specimen: Nasal Swab Updated: 01/15/25  1852     MRSA SCREEN BY PCR Not Detected    Culture, Anaerobe [[id number]]     Order Status: Canceled Specimen: Body Fluid from Abdomen     Aerobic culture [[id number]]     Order Status: Canceled Specimen: Incision site             I have reviewed all pertinent labs within the past 24 hours.    Estimated Creatinine Clearance: 51.5 mL/min (A) (based on SCr of 1.6 mg/dL (H)).    Diagnostic Results:  I have reviewed all pertinent imaging results/findings within the past 24 hours.

## 2025-01-20 NOTE — PROGRESS NOTES
[facility] - [facility]  Infectious Disease  Progress Note    Patient Name: Kevan Queen  MRN: [id number]  Admission Date: 1/15/2025  Length of Stay: 5 days  Attending Physician: Josh Ryan, *  Primary Care Provider: Rosio Armendariz FNP    Isolation Status: No active isolations  Assessment/Plan:      ID  MSSA bacteremia  I independently reviewed patient's lab work and images as documented.  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 He was also noted to have new abdominal distention/ascities s/p para - studies not c/w SBP. CT without evidence of abscess. Pt reports strict adherence to his cefadroxil. He does state that he fell on his R hip prior to admission (unclear if he had skin break - denies current joint or back pain or open wounds/skin tears). TTE without evidence of IE. Blood cx cleared as of 1/16.     Recommendations  -if drainage present from LVAD site, favor repeat DLES cultures  -continue cefazolin 2g q8hr IV, anticipate 6w course from cleared blood cx, maria isabel 2/26/25  -follow up repeat blood cx, so far no growth to date        Thank you for your consult. I will follow-up with patient. Please contact us if you have any additional questions. Above d/w primary team.       Laura Baldwin MD  Infectious Disease  [facility] - Cardiac Intensive Care      50 minutes of total time spent on the encounter, which includes face to face time and non-face to face time preparing to see the patient (eg, review of tests), obtaining and/or reviewing separately obtained history, documenting clinical information in the electronic or other health record, independently interpreting results (not separately reported) and communicating results to the  patient/family/caregiver, or care coordination (not separately reported).       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 He developed nausea and abdominal pain acutely after eating. States he also developed abdominal distention and pain since his recent discharge on 1/9. He states he has been consistently taking his cefadroxil and denies missed doses. He states his DLES is unchanged, has some purulent discharged and pain, but not significantly worse than usual. Has a headache and some blurry vision. No neck pain or joint pain. No indwelling hardware. He denies injection drug use. He denies having painful IV sites during/after his recent admission.   Interval History: No fevers documented overnight.   Pt reports tolerating abx without issues. Denies joint or back pain. He states that he fell on his R hip prior to admission, no pain currently.     Review of Systems   Constitutional:  Negative for chills and fever.   All other systems reviewed and are negative.    Objective:     Vital Signs (Most Recent):  Temp: 97.7 °F (36.5 °C) (01/20/25 0748)  Pulse: 82 (01/20/25 1037)  Resp: 18 (01/20/25 0843)  BP: 90/71 (01/20/25 0845)  SpO2: 97 % (01/20/25 0843) Vital Signs (24h Range):  Temp:  [97.5 °F (36.4 °C)-98.2 °F (36.8 °C)] 97.7 °F (36.5 °C)  Pulse:  [79-88] 82  Resp:  [16-19] 18  SpO2:  [97 %-100 %] 97 %  BP: ()/(0-79) 90/71     Weight: 58.7 kg (129 lb 6.6 oz)  Body mass index is 16.18 kg/m².    Estimated Creatinine Clearance: 51.5 mL/min (A) (based on SCr of 1.6 mg/dL (H)).     Physical Exam  Constitutional:       General: He is not in acute distress.     Appearance: He is not ill-appearing or toxic-appearing.   Pulmonary:       Effort: Pulmonary effort is normal. No respiratory distress.   Abdominal:      Palpations: Abdomen is soft.      Tenderness: There is no abdominal tenderness. There is no guarding.      Comments: LVAD dressed   Musculoskeletal:         General: No swelling or tenderness.      Right lower leg: No edema.      Left lower leg: No edema.   Skin:     General: Skin is warm and dry.   Neurological:      Mental Status: He is alert and oriented to person, place, and time.          Significant Labs:   Microbiology Results (last 7 days)       Procedure Component Value Units Date/Time    Blood culture [[id number]] Collected: 01/19/25 0318    Order Status: Completed Specimen: Blood Updated: 01/20/25 0812     Blood Culture, Routine No Growth to date      No Growth to date    Blood culture [[id number]] Collected: 01/19/25 0318    Order Status: Completed Specimen: Blood Updated: 01/20/25 0812     Blood Culture, Routine No Growth to date      No Growth to date    (rule out SBP) Culture, Anaerobic [[id number]] Collected: 01/18/25 1454    Order Status: Completed Specimen: Ascites Updated: 01/20/25 0651     Anaerobic Culture Culture in progress    Narrative:      To rule out SBP order labs: Aerobic culture [MIL626],  Culture, Anaerobic [YAN310], Gram stain [ZLX271], Albumin  [BPC443], Protein [KIU177], LDH [AYK975], WBC \T\ Dff  [YVR2360].    (rule out SBP) Aerobic culture [[id number]] Collected: 01/18/25 1454    Order Status: Completed Specimen: Ascites Updated: 01/20/25 0613     Aerobic Bacterial Culture No growth    Narrative:      To rule out SBP order labs: Aerobic culture [NWT226],  Culture, Anaerobic [OKV276], Gram stain [XIP239], Albumin  [PVD276], Protein [LFK061], LDH [JXY818], WBC \T\ Dff  [UKH6432].    Blood culture [[id number]] Collected: 01/16/25 1006    Order Status: Completed Specimen: Blood Updated: 01/19/25 1212     Blood Culture, Routine No Growth to date      No Growth to date      No Growth to date      No Growth to  date    Blood culture [[id number]] Collected: 01/16/25 1006    Order Status: Completed Specimen: Blood Updated: 01/19/25 1212     Blood Culture, Routine No Growth to date      No Growth to date      No Growth to date      No Growth to date    (rule out SBP) Gram stain [[id number]] Collected: 01/18/25 1454    Order Status: Completed Specimen: Ascites Updated: 01/18/25 2124     Gram Stain Result No WBC's      No organisms seen    Narrative:      To rule out SBP order labs: Aerobic culture [[id number]],  Culture, Anaerobic [FWV278], Gram stain [HRC547], Albumin  [APF885], Protein [VGP721], LDH [JVU244], WBC \T\ Dff  [VMX8698].    Fungus culture [[id number]] Collected: 01/18/25 1454    Order Status: Sent Specimen: Ascites Updated: 01/18/25 1531    Gram stain [[id number]]     Order Status: Canceled Specimen: Body Fluid     Anaerobic culture [[id number]]     Order Status: Canceled Specimen: Body Fluid     Aerobic culture [[id number]]     Order Status: Canceled Specimen: Peritoneal Fluid     MRSA Screen by PCR [[id number]] Collected: 01/15/25 1630    Order Status: Completed Specimen: Nasal Swab Updated: 01/15/25 1852     MRSA SCREEN BY PCR Not Detected    Culture, Anaerobe [[id number]]     Order Status: Canceled Specimen: Body Fluid from Abdomen     Aerobic culture [[id number]]     Order Status: Canceled Specimen: Incision site             Significant Imaging: I have reviewed all pertinent imaging results/findings within the past 24 hours.

## 2025-01-20 NOTE — ASSESSMENT & PLAN NOTE
BG goal: 140-180  T2DM.    - Jardiance 10 mg daily (home medication; started per primary team)  -Continue Lantus 10 units once daily  -Continue Novolog 10 units TID with meals  -Continue Low Dose Correction Scale   - POCT Glucose before meals and at bedtime  - Hypoglycemia protocol in place      ** Please notify Endocrine for any change and/or advance in diet**  ** Please call Endocrine for any BG related issues **     Discharge Planning:   TBD. Please notify endocrinology prior to discharge.

## 2025-01-20 NOTE — PLAN OF CARE
Plan of care discussed with patient. Diuresing well on PO torsemide. Pt reports blurry vision and abdominal tenderness improved. No BM overnight, receiving scheduled miralax. LVAD DP WNL, smooth LVAD hum, no LFAs noted. IV abx infusing per order. BG monitored, no coverage needed. Pt c/o insomnia, PRN melatonin ordered and given but this morning pt says did not help much. Fall precautions in place.     Problem: Adult Inpatient Plan of Care  Goal: Plan of Care Review  Outcome: Progressing  Goal: Patient-Specific Goal (Individualized)  Outcome: Progressing     Problem: Diabetes Comorbidity  Goal: Blood Glucose Level Within Targeted Range  Outcome: Progressing     Problem: Ventricular Assist Device  Goal: Absence of Bleeding  Outcome: Progressing  Goal: Absence of Embolism Signs and Symptoms  Outcome: Progressing  Goal: Optimal Blood Flow  Outcome: Progressing  Goal: Absence of Infection Signs and Symptoms  Outcome: Progressing  Goal: Effective Right-Sided Heart Function  Outcome: Progressing     Problem: Fall Injury Risk  Goal: Absence of Fall and Fall-Related Injury  Outcome: Progressing     Problem: Heart Failure  Goal: Optimal Cardiac Output  Outcome: Progressing  Goal: Stable Heart Rate and Rhythm  Outcome: Progressing  Goal: Optimal Functional Ability  Outcome: Progressing  Goal: Fluid and Electrolyte Balance  Outcome: Progressing  Goal: Improved Oral Intake  Outcome: Progressing  Goal: Effective Oxygenation and Ventilation  Outcome: Progressing

## 2025-01-20 NOTE — PROGRESS NOTES
[facility] - Cardiac Intensive Care  Heart Transplant  Progress Note    Patient Name: Kevan Queen  MRN: [id number]  Admission Date: 1/15/2025  Hospital Length of Stay: 5 days  Attending Physician: Josh Ryan, *  Primary Care Provider: Rosio Armendariz FNP  Principal Problem:Acute on chronic systolic CHF (congestive heart failure)    Subjective:   Interval History: NAOEN. Blood cultures growing staph. ID following and recommend to continue Ancef. TTE 1/19 with EF 10 -15%. G1 DD. RV systolic function severely reduced. Mod to severe TR. PASP  31 mmHg. IVC/SVC: 15 mmHg.    Cr stable - K repleted aggressively - remains on po torsemide 60mg BID.     Paracentesis without signs of SBP on labs, cultures pending. Had a Low flow alarm during rounds - Doppler 78. Exam with persistent JVD so continued diuresis.     Scheduled Meds:   amiodarone  200 mg Oral Daily    amLODIPine  10 mg Oral Daily    artificial tears  1 drop Both Eyes QID WAKE    aspirin  81 mg Oral Daily    atorvastatin  40 mg Oral Daily    ceFAZolin (Ancef) 6 g in D5W 500 mL CONTINUOUS INFUSION  6 g Intravenous Q24H    DULoxetine  30 mg Oral Daily    empagliflozin  10 mg Oral Daily    eplerenone  50 mg Oral Daily    gabapentin  100 mg Oral BID    And    gabapentin  400 mg Oral QHS    insulin aspart U-100  10 Units Subcutaneous TIDWM    insulin glargine U-100  10 Units Subcutaneous Daily    levETIRAcetam  1,500 mg Oral BID    magnesium oxide  800 mg Oral BID    methocarbamoL  500 mg Oral QID    pantoprazole  40 mg Oral Daily    polyethylene glycol  17 g Oral Daily    potassium chloride  20 mEq Oral BID    potassium chloride  40 mEq Oral 6 times per day    torsemide  60 mg Oral BID loop    warfarin  1.5 mg Oral Daily     PRN Meds:  Current Facility-Administered Medications:     acetaminophen, 650 mg, Oral, Q6H PRN    dextrose 50%, 12.5 g, Intravenous, PRN    dextrose 50%, 25 g, Intravenous, PRN    glucagon (human recombinant), 1 mg,  Intramuscular, PRN    glucose, 16 g, Oral, PRN    glucose, 24 g, Oral, PRN    insulin aspart U-100, 0-5 Units, Subcutaneous, QID (AC + HS) PRN    melatonin, 6 mg, Oral, Nightly PRN    ondansetron, 4 mg, Oral, Q8H PRN    senna-docusate 8.6-50 mg, 1 tablet, Oral, Daily PRN    Review of patient's allergies indicates:   Allergen Reactions    Bumex [bumetanide] Hives     Objective:     Vital Signs (Most Recent):  Temp: 97.7 °F (36.5 °C) (01/20/25 0748)  Pulse: 82 (01/20/25 1037)  Resp: 18 (01/20/25 0843)  BP: 90/71 (01/20/25 0845)  SpO2: 97 % (01/20/25 0843) Vital Signs (24h Range):  Temp:  [97.5 °F (36.4 °C)-98.2 °F (36.8 °C)] 97.7 °F (36.5 °C)  Pulse:  [79-88] 82  Resp:  [16-19] 18  SpO2:  [97 %-100 %] 97 %  BP: ()/(0-79) 90/71     Patient Vitals for the past 72 hrs (Last 3 readings):   Weight   01/20/25 0745 58.7 kg (129 lb 6.6 oz)   01/19/25 0900 68 kg (150 lb)     Body mass index is 16.18 kg/m².      Intake/Output Summary (Last 24 hours) at 1/20/2025 1053  Last data filed at 1/20/2025 0900  Gross per 24 hour   Intake 2017.29 ml   Output 4200 ml   Net -2182.71 ml       Telemetry: NSR 90s       Physical Exam  Vitals and nursing note reviewed.   Constitutional:       Appearance: He is well-developed.   HENT:      Head: Normocephalic.   Eyes:      Pupils: Pupils are equal, round, and reactive to light.   Cardiovascular:      Rate and Rhythm: Normal rate and regular rhythm.      Heart sounds: Murmur heard.      Comments: VAD hum, elevated JVD  Pulmonary:      Effort: Pulmonary effort is normal.      Breath sounds: Normal breath sounds.   Abdominal:      General: Bowel sounds are normal. There is distension.      Palpations: Abdomen is soft.   Musculoskeletal:         General: Normal range of motion.      Cervical back: Normal range of motion and neck supple.      Right lower leg: No edema.      Left lower leg: No edema.   Skin:     General: Skin is warm and dry.   Neurological:      Mental Status: He is alert and  oriented to person, place, and time.   Psychiatric:         Behavior: Behavior normal.            Significant Labs:  CBC:  Recent Labs   Lab 01/18/25  0348 01/18/25  1123 01/19/25  0318 01/20/25  0527   WBC 9.40 9.31 10.55 8.64   RBC 5.05  --  4.93 5.45   HGB 9.1*  --  8.9* 9.9*   HCT 30.7*  --  30.4* 33.4*     --  282 314   MCV 61*  --  62* 61*   MCH 18.0*  --  18.1* 18.2*   MCHC 29.6*  --  29.3* 29.6*     BNP:  Recent Labs   Lab 01/15/25  0436 01/17/25  0512 01/20/25  0527   BNP 1,157* 717* 526*     CMP:  Recent Labs   Lab 01/15/25  0740 01/16/25  0421 01/17/25  0512 01/17/25  1315 01/18/25  1122 01/18/25  1343 01/19/25  0318 01/20/25  0527 01/20/25  0955   *   < > 129*   < >  --    < > 142* 123* 198*   CALCIUM 8.3*   < > 9.6   < >  --    < > 9.1 9.1 9.2   ALBUMIN 2.2*  --  2.8*  --  2.5*  --   --  2.8*  --    PROT 7.9  --  10.4*  --  9.6*  --   --  9.9*  --    *   < > 132*   < >  --    < > 127* 129* 131*   K 3.4*   < > 3.0*   < >  --    < > 3.9 2.7* 3.3*   CO2 25   < > 30*   < >  --    < > 25 30* 28      < > 88*   < >  --    < > 92* 86* 88*   BUN 19   < > 23*   < >  --    < > 31* 31* 33*   CREATININE 1.3   < > 1.7*   < >  --    < > 1.4 1.5* 1.6*   ALKPHOS 141  --  193*  --  173*  --   --  197*  --    ALT 14  --  17  --   --   --   --  6*  --    AST 31  --  35  --   --   --   --  27  --    BILITOT 2.3*  --  1.9*  --   --   --   --  1.4*  --     < > = values in this interval not displayed.      Coagulation:   Recent Labs   Lab 01/18/25 0348 01/19/25 0318 01/20/25 0527   INR 2.2* 2.1* 2.2*   APTT 33.1* 33.3* 33.3*     LDH:  Recent Labs   Lab 01/18/25 0348 01/18/25  1123 01/19/25 0318 01/20/25 0527   * 238 269* 244     Microbiology:  Microbiology Results (last 7 days)       Procedure Component Value Units Date/Time    Blood culture [[id number]] Collected: 01/19/25 0318    Order Status: Completed Specimen: Blood Updated: 01/20/25 0812     Blood Culture, Routine No Growth to date       No Growth to date    Blood culture [[id number]] Collected: 01/19/25 0318    Order Status: Completed Specimen: Blood Updated: 01/20/25 0812     Blood Culture, Routine No Growth to date      No Growth to date    (rule out SBP) Culture, Anaerobic [[id number]] Collected: 01/18/25 1454    Order Status: Completed Specimen: Ascites Updated: 01/20/25 0651     Anaerobic Culture Culture in progress    Narrative:      To rule out SBP order labs: Aerobic culture [OST564],  Culture, Anaerobic [RRG612], Gram stain [JQB869], Albumin  [DVI046], Protein [HWR775], LDH [WJJ835], WBC \T\ Dff  [RBC0276].    (rule out SBP) Aerobic culture [[id number]] Collected: 01/18/25 1454    Order Status: Completed Specimen: Ascites Updated: 01/20/25 0613     Aerobic Bacterial Culture No growth    Narrative:      To rule out SBP order labs: Aerobic culture [ABF836],  Culture, Anaerobic [JAD584], Gram stain [QDQ741], Albumin  [HQC421], Protein [GXZ729], LDH [FBC850], WBC \T\ Dff  [GEH0445].    Blood culture [[id number]] Collected: 01/16/25 1006    Order Status: Completed Specimen: Blood Updated: 01/19/25 1212     Blood Culture, Routine No Growth to date      No Growth to date      No Growth to date      No Growth to date    Blood culture [[id number]] Collected: 01/16/25 1006    Order Status: Completed Specimen: Blood Updated: 01/19/25 1212     Blood Culture, Routine No Growth to date      No Growth to date      No Growth to date      No Growth to date    (rule out SBP) Gram stain [[id number]] Collected: 01/18/25 1454    Order Status: Completed Specimen: Ascites Updated: 01/18/25 2124     Gram Stain Result No WBC's      No organisms seen    Narrative:      To rule out SBP order labs: Aerobic culture [JCW867],  Culture, Anaerobic [UDV145], Gram stain [LIT286], Albumin  [NCM901], Protein [THF670], LDH [POK349], WBC \T\ Dff  [KML7107].    Fungus culture [[id number]] Collected: 01/18/25 1454    Order Status: Sent Specimen: Ascites Updated: 01/18/25  1531    Gram stain [[id number]]     Order Status: Canceled Specimen: Body Fluid     Anaerobic culture [[id number]]     Order Status: Canceled Specimen: Body Fluid     Aerobic culture [[id number]]     Order Status: Canceled Specimen: Peritoneal Fluid     MRSA Screen by PCR [[id number]] Collected: 01/15/25 1630    Order Status: Completed Specimen: Nasal Swab Updated: 01/15/25 1852     MRSA SCREEN BY PCR Not Detected    Culture, Anaerobe [[id number]]     Order Status: Canceled Specimen: Body Fluid from Abdomen     Aerobic culture [[id number]]     Order Status: Canceled Specimen: Incision site             I have reviewed all pertinent labs within the past 24 hours.    Estimated Creatinine Clearance: 51.5 mL/min (A) (based on SCr of 1.6 mg/dL (H)).    Diagnostic Results:  I have reviewed all pertinent imaging results/findings within the past 24 hours.  Assessment and Plan:     No notes on file    * Acute on chronic systolic CHF (congestive heart failure)  -NICM  -Last 2D Echo 1/19/25 with EF 10 -15%. G1 DD. RV systolic function severely reduced. Mod to severe TR. PASP  31 mmHg. IVC/SVC: 15 mmHg.  - Previous Echo 8/26/24 : LVEF 5-10%, LVEDD  7.2 cm  -S/p diuresis with Lasix gtt.  Now on Torsemide 60mg BID - I/O: negative 3042 mL for yesterday.   -GDMT with Eplerenone  -2g Na dietary restriction, 1500 mL fluid restriction, strict I/Os      LVAD (left ventricular assist device) present  HeartMate 3 Implanted 6/23/2022 with early RV failure requiring RVAD with ProTek Duo   -Continue Coumadin,  Goal INR 2.0-3.0 . Therapeutic today.  Previous home regimen 1.5mg Qdaily  -Antiplatelets ASA 81 mg, on hold 2/2 GIB history  -LDH is stable overall today. Will continue to monitor daily.  -Speed set at 5100  -Interrogation notable for PI events with 1 LFA on 1/20/25  -Not listed for OHTx    Procedure: Device Interrogation Including analysis of device parameters  Current Settings: Ventricular Assist Device        1/20/2025     8:42 AM  1/20/2025     7:48 AM 1/20/2025     3:57 AM 1/19/2025    11:12 PM 1/19/2025     7:36 PM 1/19/2025     4:09 PM 1/19/2025     1:00 PM   TXP LVAD INTERROGATIONS   Type HeartMate3 HeartMate3 HeartMate3 HeartMate3 HeartMate3 HeartMate3 HeartMate3   Flow 2.2 5.4 3.9 4 4.1 4.2 3.7   Speed 5150 5100 5100 5100 5100 5100 5100   PI 3.4 5.9 6.7 5.8 5.5 5.2 5.1   Power (Serna) 7.7 3.6 3.7 3.7 3.7 3.6 4.9   LSL  4700    4700    Pulsatility  Intermittent pulse Intermittent pulse Intermittent pulse Intermittent pulse Intermittent pulse Intermittent pulse         Ascites  -Will monitor with diuresis   - Labs negative for SBP - follow cultures - NGTD    Hypokalemia  Replace and monitor per ptotocol    HTN (hypertension)  -On home dose of Amlodipine and Eplerenone    Chronic anticoagulation  -See above VAD    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   -Repeat Blood Cx 1/16 negative thus far  -Started on Vanc/Zosyn.  -ID consult.   -Vanc and Zosyn changed to Ancef  -TTE and ophthalmology consult ordered per ID recommendations   -diagnostic paracentesis -negative so far, but cultures pending.     Right heart failure secondary to left heart failure-post LVAD surgery  -Weaned off inotropes 8/2024  -See above heart failure     Temporal lobe seizure  -On home dose of Shanthi Vazquez MD  Heart Transplant  [facility] - [facility]

## 2025-01-20 NOTE — ASSESSMENT & PLAN NOTE
I independently reviewed patient's lab work and images as documented.  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 He was also noted to have new abdominal distention/ascities s/p para - studies not c/w SBP. CT without evidence of abscess. Pt reports strict adherence to his cefadroxil. He does state that he fell on his R hip prior to admission (unclear if he had skin break - denies current joint or back pain or open wounds/skin tears). TTE without evidence of IE. Blood cx cleared as of 1/16.     Recommendations  -if drainage present from LVAD site, favor repeat DLES cultures  -continue cefazolin 2g q8hr IV, anticipate 6w course from cleared blood cx, maria isabel 2/26/25  -follow up repeat blood cx, so far no growth to date

## 2025-01-20 NOTE — SUBJECTIVE & OBJECTIVE
Interval History: No fevers documented overnight.   Pt reports tolerating abx without issues. Denies joint or back pain. He states that he fell on his R hip prior to admission, no pain currently.     Review of Systems   Constitutional:  Negative for chills and fever.   All other systems reviewed and are negative.    Objective:     Vital Signs (Most Recent):  Temp: 97.7 °F (36.5 °C) (01/20/25 0748)  Pulse: 82 (01/20/25 1037)  Resp: 18 (01/20/25 0843)  BP: 90/71 (01/20/25 0845)  SpO2: 97 % (01/20/25 0843) Vital Signs (24h Range):  Temp:  [97.5 °F (36.4 °C)-98.2 °F (36.8 °C)] 97.7 °F (36.5 °C)  Pulse:  [79-88] 82  Resp:  [16-19] 18  SpO2:  [97 %-100 %] 97 %  BP: ()/(0-79) 90/71     Weight: 58.7 kg (129 lb 6.6 oz)  Body mass index is 16.18 kg/m².    Estimated Creatinine Clearance: 51.5 mL/min (A) (based on SCr of 1.6 mg/dL (H)).     Physical Exam  Constitutional:       General: He is not in acute distress.     Appearance: He is not ill-appearing or toxic-appearing.   Pulmonary:      Effort: Pulmonary effort is normal. No respiratory distress.   Abdominal:      Palpations: Abdomen is soft.      Tenderness: There is no abdominal tenderness. There is no guarding.      Comments: LVAD dressed   Musculoskeletal:         General: No swelling or tenderness.      Right lower leg: No edema.      Left lower leg: No edema.   Skin:     General: Skin is warm and dry.   Neurological:      Mental Status: He is alert and oriented to person, place, and time.          Significant Labs:   Microbiology Results (last 7 days)       Procedure Component Value Units Date/Time    Blood culture [[id number]] Collected: 01/19/25 0318    Order Status: Completed Specimen: Blood Updated: 01/20/25 0812     Blood Culture, Routine No Growth to date      No Growth to date    Blood culture [[id number]] Collected: 01/19/25 0318    Order Status: Completed Specimen: Blood Updated: 01/20/25 0812     Blood Culture, Routine No Growth to date      No Growth  to date    (rule out SBP) Culture, Anaerobic [[id number]] Collected: 01/18/25 1454    Order Status: Completed Specimen: Ascites Updated: 01/20/25 0651     Anaerobic Culture Culture in progress    Narrative:      To rule out SBP order labs: Aerobic culture [GGB456],  Culture, Anaerobic [PJX173], Gram stain [TBD681], Albumin  [NZI468], Protein [UTC371], LDH [AUN397], WBC \T\ Dff  [WAP7391].    (rule out SBP) Aerobic culture [[id number]] Collected: 01/18/25 1454    Order Status: Completed Specimen: Ascites Updated: 01/20/25 0613     Aerobic Bacterial Culture No growth    Narrative:      To rule out SBP order labs: Aerobic culture [RLJ603],  Culture, Anaerobic [VXG540], Gram stain [FPB936], Albumin  [NKI503], Protein [JWL833], LDH [TYI883], WBC \T\ Dff  [IZK6837].    Blood culture [[id number]] Collected: 01/16/25 1006    Order Status: Completed Specimen: Blood Updated: 01/19/25 1212     Blood Culture, Routine No Growth to date      No Growth to date      No Growth to date      No Growth to date    Blood culture [[id number]] Collected: 01/16/25 1006    Order Status: Completed Specimen: Blood Updated: 01/19/25 1212     Blood Culture, Routine No Growth to date      No Growth to date      No Growth to date      No Growth to date    (rule out SBP) Gram stain [[id number]] Collected: 01/18/25 1454    Order Status: Completed Specimen: Ascites Updated: 01/18/25 2124     Gram Stain Result No WBC's      No organisms seen    Narrative:      To rule out SBP order labs: Aerobic culture [CNB965],  Culture, Anaerobic [LRH965], Gram stain [CVB618], Albumin  [GIM899], Protein [LXT300], LDH [BIM424], WBC \T\ Dff  [ANV8284].    Fungus culture [[id number]] Collected: 01/18/25 1454    Order Status: Sent Specimen: Ascites Updated: 01/18/25 1531    Gram stain [[id number]]     Order Status: Canceled Specimen: Body Fluid     Anaerobic culture [[id number]]     Order Status: Canceled Specimen: Body Fluid     Aerobic culture [[id number]]     Order  Status: Canceled Specimen: Peritoneal Fluid     MRSA Screen by PCR [[id number]] Collected: 01/15/25 1630    Order Status: Completed Specimen: Nasal Swab Updated: 01/15/25 1852     MRSA SCREEN BY PCR Not Detected    Culture, Anaerobe [[id number]]     Order Status: Canceled Specimen: Body Fluid from Abdomen     Aerobic culture [[id number]]     Order Status: Canceled Specimen: Incision site             Significant Imaging: I have reviewed all pertinent imaging results/findings within the past 24 hours.

## 2025-01-20 NOTE — PROGRESS NOTES
01/20/25 0842   Device   Type HeartMate3   Flow 2.2   Speed 5150   PI 3.4   Power (Serna) 7.7     Notified Robby. LVAD coordinator,  and  pt just had LFA, and both MD came at bedside assessed pt, pt c/o dizzy and ear fullness, pt stated LFA happened after he moved from bed to chair, he was sitting with legs up, doppler 78, MAP 77. 0900 Pt up in chair with legs up and denies dizziness, WCTM.

## 2025-01-20 NOTE — PROGRESS NOTES
01/20/2025  Ruma Li    Current provider:  Josh Ryan, *    Device interrogation:      1/20/2025    12:00 PM 1/20/2025     8:42 AM 1/20/2025     7:48 AM 1/20/2025     3:57 AM 1/19/2025    11:12 PM 1/19/2025     7:36 PM 1/19/2025     4:09 PM   TXP LVAD INTERROGATIONS   Type HeartMate3 HeartMate3 HeartMate3 HeartMate3 HeartMate3 HeartMate3 HeartMate3   Flow 3.9 2.2 5.4 3.9 4 4.1 4.2   Speed 5100 5150 5100 5100 5100 5100 5100   PI 5.9 3.4 5.9 6.7 5.8 5.5 5.2   Power (Serna) 3.6 7.7 3.6 3.7 3.7 3.7 3.6   LSL 4700  4700    4700   Pulsatility Pulse  Intermittent pulse Intermittent pulse Intermittent pulse Intermittent pulse Intermittent pulse          Rounded on Kevan Queen to ensure all mechanical assist device settings (IABP or VAD) were appropriate and all parameters were within limits.  I was able to ensure all back up equipment was present, the staff had no issues, and the Perfusion Department daily rounding was complete.      For implantable VADs: Interrogation of Ventricular assist device was performed with analysis of device parameters and review of device function. I have personally reviewed the interrogation findings and agree with findings as stated.     In emergency, the nursing units have been notified to contact the perfusion department either by:  Calling [phone] from 630am to 4pm Mon thru Fri, utilizing the On-Call Finder functionality of Epic and searching for Perfusion, or by contacting the hospital  from 4pm to 630am and on weekends and asking to speak with the perfusionist on call.    12:42 PM

## 2025-01-20 NOTE — ASSESSMENT & PLAN NOTE
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 Therapeutic today.  Previous home regimen 1.5mg Qdaily  -Antiplatelets ASA 81 mg, on hold 2/2 GIB history  -LDH is stable overall today. Will continue to monitor daily.  -Speed set at 5100  -Interrogation notable for PI events with 1 LFA on 1/20/25  -Not listed for OHTx    Procedure: Device Interrogation Including analysis of device parameters  Current Settings: Ventricular Assist Device        1/20/2025     8:42 AM 1/20/2025     7:48 AM 1/20/2025     3:57 AM 1/19/2025    11:12 PM 1/19/2025     7:36 PM 1/19/2025     4:09 PM 1/19/2025     1:00 PM   TXP LVAD INTERROGATIONS   Type HeartMate3 HeartMate3 HeartMate3 HeartMate3 HeartMate3 HeartMate3 HeartMate3   Flow 2.2 5.4 3.9 4 4.1 4.2 3.7   Speed 5150 5100 5100 5100 5100 5100 5100   PI 3.4 5.9 6.7 5.8 5.5 5.2 5.1   Power (Serna) 7.7 3.6 3.7 3.7 3.7 3.6 4.9   LSL  4700    4700    Pulsatility  Intermittent pulse Intermittent pulse Intermittent pulse Intermittent pulse Intermittent pulse Intermittent pulse

## 2025-01-21 LAB
ANION GAP SERPL CALC-SCNC: 12 MMOL/L (ref 8–16)
APTT PPP: 33.6 SEC (ref 21–32)
BACTERIA BLD CULT: NORMAL
BACTERIA BLD CULT: NORMAL
BASOPHILS # BLD AUTO: 0.04 K/UL (ref 0–0.2)
BASOPHILS NFR BLD: 0.4 % (ref 0–1.9)
BUN SERPL-MCNC: 35 MG/DL (ref 6–20)
CALCIUM SERPL-MCNC: 9.4 MG/DL (ref 8.7–10.5)
CHLORIDE SERPL-SCNC: 86 MMOL/L (ref 95–110)
CO2 SERPL-SCNC: 31 MMOL/L (ref 23–29)
CREAT SERPL-MCNC: 1.5 MG/DL (ref 0.5–1.4)
DIFFERENTIAL METHOD BLD: ABNORMAL
EOSINOPHIL # BLD AUTO: 0 K/UL (ref 0–0.5)
EOSINOPHIL NFR BLD: 0.3 % (ref 0–8)
ERYTHROCYTE [DISTWIDTH] IN BLOOD BY AUTOMATED COUNT: 24 % (ref 11.5–14.5)
EST. GFR  (NO RACE VARIABLE): >60 ML/MIN/1.73 M^2
GLUCOSE SERPL-MCNC: 113 MG/DL (ref 70–110)
HCT VFR BLD AUTO: 31.9 % (ref 40–54)
HGB BLD-MCNC: 9.2 G/DL (ref 14–18)
IMM GRANULOCYTES # BLD AUTO: 0.16 K/UL (ref 0–0.04)
IMM GRANULOCYTES NFR BLD AUTO: 1.7 % (ref 0–0.5)
INR PPP: 2.1 (ref 0.8–1.2)
LDH SERPL L TO P-CCNC: 272 U/L (ref 110–260)
LYMPHOCYTES # BLD AUTO: 1 K/UL (ref 1–4.8)
LYMPHOCYTES NFR BLD: 10.5 % (ref 18–48)
MAGNESIUM SERPL-MCNC: 1.9 MG/DL (ref 1.6–2.6)
MCH RBC QN AUTO: 17.8 PG (ref 27–31)
MCHC RBC AUTO-ENTMCNC: 28.8 G/DL (ref 32–36)
MCV RBC AUTO: 62 FL (ref 82–98)
MONOCYTES # BLD AUTO: 0.9 K/UL (ref 0.3–1)
MONOCYTES NFR BLD: 9.3 % (ref 4–15)
NEUTROPHILS # BLD AUTO: 7.5 K/UL (ref 1.8–7.7)
NEUTROPHILS NFR BLD: 77.8 % (ref 38–73)
NRBC BLD-RTO: 0 /100 WBC
PHOSPHATE SERPL-MCNC: 3.6 MG/DL (ref 2.7–4.5)
PLATELET # BLD AUTO: 344 K/UL (ref 150–450)
PMV BLD AUTO: ABNORMAL FL (ref 9.2–12.9)
POCT GLUCOSE: 111 MG/DL (ref 70–110)
POCT GLUCOSE: 134 MG/DL (ref 70–110)
POCT GLUCOSE: 163 MG/DL (ref 70–110)
POCT GLUCOSE: 178 MG/DL (ref 70–110)
POCT GLUCOSE: 66 MG/DL (ref 70–110)
POTASSIUM SERPL-SCNC: 3.1 MMOL/L (ref 3.5–5.1)
PROTHROMBIN TIME: 21.9 SEC (ref 9–12.5)
RBC # BLD AUTO: 5.16 M/UL (ref 4.6–6.2)
SODIUM SERPL-SCNC: 129 MMOL/L (ref 136–145)
WBC # BLD AUTO: 9.61 K/UL (ref 3.9–12.7)

## 2025-01-21 PROCEDURE — 25000003 PHARM REV CODE 250: Performed by: STUDENT IN AN ORGANIZED HEALTH CARE EDUCATION/TRAINING PROGRAM

## 2025-01-21 PROCEDURE — 83615 LACTATE (LD) (LDH) ENZYME: CPT | Performed by: STUDENT IN AN ORGANIZED HEALTH CARE EDUCATION/TRAINING PROGRAM

## 2025-01-21 PROCEDURE — 84100 ASSAY OF PHOSPHORUS: CPT | Performed by: STUDENT IN AN ORGANIZED HEALTH CARE EDUCATION/TRAINING PROGRAM

## 2025-01-21 PROCEDURE — 85730 THROMBOPLASTIN TIME PARTIAL: CPT | Performed by: STUDENT IN AN ORGANIZED HEALTH CARE EDUCATION/TRAINING PROGRAM

## 2025-01-21 PROCEDURE — 36415 COLL VENOUS BLD VENIPUNCTURE: CPT | Performed by: STUDENT IN AN ORGANIZED HEALTH CARE EDUCATION/TRAINING PROGRAM

## 2025-01-21 PROCEDURE — 80048 BASIC METABOLIC PNL TOTAL CA: CPT | Performed by: NURSE PRACTITIONER

## 2025-01-21 PROCEDURE — 99233 SBSQ HOSP IP/OBS HIGH 50: CPT | Mod: ,,, | Performed by: INTERNAL MEDICINE

## 2025-01-21 PROCEDURE — 63600175 PHARM REV CODE 636 W HCPCS: Performed by: INTERNAL MEDICINE

## 2025-01-21 PROCEDURE — 25000003 PHARM REV CODE 250

## 2025-01-21 PROCEDURE — 85610 PROTHROMBIN TIME: CPT | Performed by: NURSE PRACTITIONER

## 2025-01-21 PROCEDURE — 83735 ASSAY OF MAGNESIUM: CPT | Performed by: NURSE PRACTITIONER

## 2025-01-21 PROCEDURE — 27000248 HC VAD-ADDITIONAL DAY

## 2025-01-21 PROCEDURE — 85025 COMPLETE CBC W/AUTO DIFF WBC: CPT | Performed by: NURSE PRACTITIONER

## 2025-01-21 PROCEDURE — 93750 INTERROGATION VAD IN PERSON: CPT | Mod: ,,, | Performed by: INTERNAL MEDICINE

## 2025-01-21 PROCEDURE — 20600001 HC STEP DOWN PRIVATE ROOM

## 2025-01-21 PROCEDURE — 25000003 PHARM REV CODE 250: Performed by: INTERNAL MEDICINE

## 2025-01-21 PROCEDURE — 25000003 PHARM REV CODE 250: Performed by: NURSE PRACTITIONER

## 2025-01-21 RX ORDER — POTASSIUM CHLORIDE 20 MEQ/1
60 TABLET, EXTENDED RELEASE ORAL ONCE
Status: DISCONTINUED | OUTPATIENT
Start: 2025-01-21 | End: 2025-01-21

## 2025-01-21 RX ORDER — POTASSIUM CHLORIDE 20 MEQ/1
60 TABLET, EXTENDED RELEASE ORAL ONCE
Status: COMPLETED | OUTPATIENT
Start: 2025-01-21 | End: 2025-01-21

## 2025-01-21 RX ADMIN — HYPROMELLOSE 2910 1 DROP: 5 SOLUTION/ DROPS OPHTHALMIC at 05:01

## 2025-01-21 RX ADMIN — GABAPENTIN 100 MG: 100 CAPSULE ORAL at 08:01

## 2025-01-21 RX ADMIN — EMPAGLIFLOZIN 10 MG: 10 TABLET, FILM COATED ORAL at 11:01

## 2025-01-21 RX ADMIN — PANTOPRAZOLE SODIUM 40 MG: 40 TABLET, DELAYED RELEASE ORAL at 09:01

## 2025-01-21 RX ADMIN — TORSEMIDE 60 MG: 20 TABLET ORAL at 09:01

## 2025-01-21 RX ADMIN — DULOXETINE HYDROCHLORIDE 30 MG: 30 CAPSULE, DELAYED RELEASE ORAL at 09:01

## 2025-01-21 RX ADMIN — POTASSIUM CHLORIDE 20 MEQ: 1500 TABLET, EXTENDED RELEASE ORAL at 09:01

## 2025-01-21 RX ADMIN — INSULIN ASPART 10 UNITS: 100 INJECTION, SOLUTION INTRAVENOUS; SUBCUTANEOUS at 01:01

## 2025-01-21 RX ADMIN — GABAPENTIN 400 MG: 400 CAPSULE ORAL at 08:01

## 2025-01-21 RX ADMIN — HYPROMELLOSE 2910 1 DROP: 5 SOLUTION/ DROPS OPHTHALMIC at 11:01

## 2025-01-21 RX ADMIN — DEXTROSE MONOHYDRATE 6 G: 25000 INJECTION, SOLUTION INTRAVENOUS at 05:01

## 2025-01-21 RX ADMIN — GABAPENTIN 100 MG: 100 CAPSULE ORAL at 09:01

## 2025-01-21 RX ADMIN — Medication 6 MG: at 08:01

## 2025-01-21 RX ADMIN — METHOCARBAMOL 500 MG: 500 TABLET ORAL at 08:01

## 2025-01-21 RX ADMIN — Medication 800 MG: at 08:01

## 2025-01-21 RX ADMIN — Medication 800 MG: at 09:01

## 2025-01-21 RX ADMIN — ASPIRIN 81 MG: 81 TABLET, COATED ORAL at 09:01

## 2025-01-21 RX ADMIN — LEVETIRACETAM 1500 MG: 500 TABLET, FILM COATED ORAL at 08:01

## 2025-01-21 RX ADMIN — AMIODARONE HYDROCHLORIDE 200 MG: 200 TABLET ORAL at 09:01

## 2025-01-21 RX ADMIN — WARFARIN SODIUM 1.5 MG: 1 TABLET ORAL at 08:01

## 2025-01-21 RX ADMIN — METHOCARBAMOL 500 MG: 500 TABLET ORAL at 01:01

## 2025-01-21 RX ADMIN — POTASSIUM CHLORIDE 20 MEQ: 1500 TABLET, EXTENDED RELEASE ORAL at 08:01

## 2025-01-21 RX ADMIN — INSULIN ASPART 10 UNITS: 100 INJECTION, SOLUTION INTRAVENOUS; SUBCUTANEOUS at 09:01

## 2025-01-21 RX ADMIN — LEVETIRACETAM 1500 MG: 500 TABLET, FILM COATED ORAL at 09:01

## 2025-01-21 RX ADMIN — INSULIN GLARGINE 10 UNITS: 100 INJECTION, SOLUTION SUBCUTANEOUS at 09:01

## 2025-01-21 RX ADMIN — ATORVASTATIN CALCIUM 40 MG: 40 TABLET, FILM COATED ORAL at 09:01

## 2025-01-21 RX ADMIN — EPLERENONE 50 MG: 25 TABLET, FILM COATED ORAL at 09:01

## 2025-01-21 RX ADMIN — METHOCARBAMOL 500 MG: 500 TABLET ORAL at 05:01

## 2025-01-21 RX ADMIN — TORSEMIDE 60 MG: 20 TABLET ORAL at 05:01

## 2025-01-21 RX ADMIN — METHOCARBAMOL 500 MG: 500 TABLET ORAL at 09:01

## 2025-01-21 RX ADMIN — HYPROMELLOSE 2910 1 DROP: 5 SOLUTION/ DROPS OPHTHALMIC at 08:01

## 2025-01-21 RX ADMIN — POTASSIUM CHLORIDE 60 MEQ: 1500 TABLET, EXTENDED RELEASE ORAL at 06:01

## 2025-01-21 RX ADMIN — HYPROMELLOSE 2910 1 DROP: 5 SOLUTION/ DROPS OPHTHALMIC at 09:01

## 2025-01-21 RX ADMIN — INSULIN ASPART 10 UNITS: 100 INJECTION, SOLUTION INTRAVENOUS; SUBCUTANEOUS at 05:01

## 2025-01-21 RX ADMIN — AMLODIPINE BESYLATE 10 MG: 10 TABLET ORAL at 09:01

## 2025-01-21 NOTE — PLAN OF CARE
Chart reviewed - blood cx no growth to date as of 1/16. Continue cefazolin. ID will continue to follow.

## 2025-01-21 NOTE — CARE UPDATE
BG goal 140-180    -A1C   Lab Results   Component Value Date    HGBA1C 10.6 (H) 12/31/2024       -HOME REGIMEN:   - Lantus 20 units daily  - Novolog SSI with meals   - Novolog SSI  150 - 200 + 2 unit    201 - 250 + 4 units    251 - 300 + 6 units    301 - 350 + 8 units       > 350   + 10 units  - Jardiance 10 mg    -INPATIENT REGIMEN: see below    -GLUCOSE TREND FOR THE PAST 24HRS: 135-224    -NO HYPOGYCEMIAS NOTED     -TOLERATING 100% OF PO DIET     -Diet: Diet Cardiac Low Sodium,2gm, Double Portions; Fluid - 2000mL      -Steroids - n/a    -Tube Feeds - n/a      Remains in CICU. BG well controlled on current SQ insulin regimen.       Plan:  - Jardiance 10 mg daily (home medication; started per primary team)  -Continue Lantus 10 units once daily  -Continue Novolog 10 units TID with meals  -Continue Low Dose Correction Scale   - POCT Glucose before meals and at bedtime  - Hypoglycemia protocol in place      Discharge planning:    Endocrine to continue to follow    ** Please call Endocrine for any BG related issues **

## 2025-01-21 NOTE — PLAN OF CARE
Problem: Sepsis/Septic Shock  Goal: Absence of Infection Signs and Symptoms  Outcome: Progressing  Intervention: Initiate Sepsis Management  Flowsheets (Taken 1/21/2025 0024)  Infection Prevention: rest/sleep promoted  Infection Management: aseptic technique maintained  Isolation Precautions: precautions maintained  Intervention: Promote Stabilization  Flowsheets (Taken 1/21/2025 0024)  Fluid/Electrolyte Management:   electrolyte supplement adjusted   fluids restricted  Intervention: Promote Recovery  Flowsheets (Taken 1/21/2025 0024)  Sleep/Rest Enhancement:   awakenings minimized   regular sleep/rest pattern promoted  Activity Management: Ambulated to bathroom - L4     Problem: Fall Injury Risk  Goal: Absence of Fall and Fall-Related Injury  Outcome: Progressing  Intervention: Identify and Manage Contributors  Flowsheets (Taken 1/21/2025 0024)  Self-Care Promotion:   independence encouraged   BADL personal objects within reach  Medication Review/Management: medications reviewed  Intervention: Promote Injury-Free Environment  Flowsheets (Taken 1/21/2025 0024)  Safety Promotion/Fall Prevention:   assistive device/personal item within reach   commode/urinal/bedpan at bedside   nonskid shoes/socks when out of bed   lighting adjusted   medications reviewed   room near unit station   side rails raised x 2     Problem: Heart Failure  Goal: Optimal Cardiac Output  Outcome: Progressing  Intervention: Optimize Cardiac Output  Flowsheets (Taken 1/21/2025 0024)  Environmental Support:   calm environment promoted   rest periods encouraged  Goal: Fluid and Electrolyte Balance  Outcome: Progressing  Intervention: Monitor and Manage Fluid and Electrolyte Balance  Flowsheets (Taken 1/21/2025 0024)  Fluid/Electrolyte Management:   electrolyte supplement adjusted   fluids restricted

## 2025-01-21 NOTE — PLAN OF CARE
LVAD number and doppler WNL. LVAD dressing changed per protocol ( see note). VSS. BG monitored. Pt up in chair during the day. Pt educated on fall risk and remained free from falls/trauma/injury. Denies chest pain, SOB, palpitations, dizziness, pain, or discomfort. Plan of care reviewed with pt, all questions answered. Bed locked in lowest position, call bell within reach, no acute distress noted, will continue to monitor.

## 2025-01-21 NOTE — NURSING
Received report from RIAN Rueda last night. Patient alert and oriented. No  pain. No sign of distress. Iv line in place- Infusing with IVAbx. Tele monitor in place.On room air.HM3 in place - all numbers WDL Call bell given on his reach. Instructed to call for any concerns or assistance. Bed on lowest position. Non skid socks on. Plan of care discussed with patient, question answered.

## 2025-01-21 NOTE — PROGRESS NOTES
01/21/2025  Ruma Li    Current provider:  Josh Ryan, *    Device interrogation:      1/21/2025     9:00 AM 1/21/2025     4:00 AM 1/20/2025    11:55 PM 1/20/2025     7:54 PM 1/20/2025     4:00 PM 1/20/2025    12:00 PM 1/20/2025     8:42 AM   TXP LVAD INTERROGATIONS   Type HeartMate3 HeartMate3 HeartMate3 HeartMate3 HeartMate3 HeartMate3 HeartMate3   Flow 4.1 4.1 3.7 3.8 3.9 3.9 2.2   Speed 5150 5150 5100 5100 5100 5100 5150   PI 5.3 5.3 7 6.5 6.1 5.9 3.4   Power (Serna) 3.8 3.8 3.6 3.6 3.7 3.6 7.7   LSL  4700 4700 4700 4700 4700    Pulsatility Intermittent pulse Intermittent pulse Intermittent pulse Intermittent pulse Intermittent pulse Pulse           Rounded on Kevan Queen to ensure all mechanical assist device settings (IABP or VAD) were appropriate and all parameters were within limits.  I was able to ensure all back up equipment was present, the staff had no issues, and the Perfusion Department daily rounding was complete.      For implantable VADs: Interrogation of Ventricular assist device was performed with analysis of device parameters and review of device function. I have personally reviewed the interrogation findings and agree with findings as stated.     In emergency, the nursing units have been notified to contact the perfusion department either by:  Calling [phone] from 630am to 4pm Mon thru Fri, utilizing the On-Call Finder functionality of Epic and searching for Perfusion, or by contacting the hospital  from 4pm to 630am and on weekends and asking to speak with the perfusionist on call.    11:00 AM

## 2025-01-21 NOTE — PLAN OF CARE
Problem: Adult Inpatient Plan of Care  Goal: Absence of Hospital-Acquired Illness or Injury  Outcome: Progressing     Problem: Diabetes Comorbidity  Goal: Blood Glucose Level Within Targeted Range  Outcome: Progressing     Problem: Sepsis/Septic Shock  Goal: Absence of Bleeding  Outcome: Progressing     Problem: Ventricular Assist Device  Goal: Optimal Adjustment to Device  Outcome: Progressing     Problem: Fall Injury Risk  Goal: Absence of Fall and Fall-Related Injury  Outcome: Progressing     Problem: Heart Failure  Goal: Stable Heart Rate and Rhythm  Outcome: Progressing

## 2025-01-22 LAB
ALBUMIN SERPL BCP-MCNC: 3 G/DL (ref 3.5–5.2)
ALP SERPL-CCNC: 206 U/L (ref 40–150)
ALT SERPL W/O P-5'-P-CCNC: 5 U/L (ref 10–44)
ANION GAP SERPL CALC-SCNC: 10 MMOL/L (ref 8–16)
ANION GAP SERPL CALC-SCNC: 12 MMOL/L (ref 8–16)
APTT PPP: 33 SEC (ref 21–32)
AST SERPL-CCNC: 30 U/L (ref 10–40)
BACTERIA SPEC AEROBE CULT: NO GROWTH
BASOPHILS # BLD AUTO: 0.03 K/UL (ref 0–0.2)
BASOPHILS NFR BLD: 0.3 % (ref 0–1.9)
BILIRUB DIRECT SERPL-MCNC: 0.8 MG/DL (ref 0.1–0.3)
BILIRUB SERPL-MCNC: 1.2 MG/DL (ref 0.1–1)
BNP SERPL-MCNC: 463 PG/ML (ref 0–99)
BUN SERPL-MCNC: 35 MG/DL (ref 6–20)
BUN SERPL-MCNC: 35 MG/DL (ref 6–20)
CALCIUM SERPL-MCNC: 9.1 MG/DL (ref 8.7–10.5)
CALCIUM SERPL-MCNC: 9.5 MG/DL (ref 8.7–10.5)
CHLORIDE SERPL-SCNC: 87 MMOL/L (ref 95–110)
CHLORIDE SERPL-SCNC: 89 MMOL/L (ref 95–110)
CO2 SERPL-SCNC: 30 MMOL/L (ref 23–29)
CO2 SERPL-SCNC: 31 MMOL/L (ref 23–29)
CREAT SERPL-MCNC: 1.5 MG/DL (ref 0.5–1.4)
CREAT SERPL-MCNC: 1.7 MG/DL (ref 0.5–1.4)
CRP SERPL-MCNC: 8.9 MG/L (ref 0–8.2)
DIFFERENTIAL METHOD BLD: ABNORMAL
EOSINOPHIL # BLD AUTO: 0 K/UL (ref 0–0.5)
EOSINOPHIL NFR BLD: 0.3 % (ref 0–8)
ERYTHROCYTE [DISTWIDTH] IN BLOOD BY AUTOMATED COUNT: 23.8 % (ref 11.5–14.5)
EST. GFR  (NO RACE VARIABLE): 51.9 ML/MIN/1.73 M^2
EST. GFR  (NO RACE VARIABLE): >60 ML/MIN/1.73 M^2
GLUCOSE SERPL-MCNC: 158 MG/DL (ref 70–110)
GLUCOSE SERPL-MCNC: 166 MG/DL (ref 70–110)
HCT VFR BLD AUTO: 32.6 % (ref 40–54)
HGB BLD-MCNC: 9.3 G/DL (ref 14–18)
IMM GRANULOCYTES # BLD AUTO: 0.03 K/UL (ref 0–0.04)
IMM GRANULOCYTES NFR BLD AUTO: 0.3 % (ref 0–0.5)
INR PPP: 2.1 (ref 0.8–1.2)
LDH SERPL L TO P-CCNC: 244 U/L (ref 110–260)
LYMPHOCYTES # BLD AUTO: 1.1 K/UL (ref 1–4.8)
LYMPHOCYTES NFR BLD: 12 % (ref 18–48)
MAGNESIUM SERPL-MCNC: 2.1 MG/DL (ref 1.6–2.6)
MCH RBC QN AUTO: 17.7 PG (ref 27–31)
MCHC RBC AUTO-ENTMCNC: 28.5 G/DL (ref 32–36)
MCV RBC AUTO: 62 FL (ref 82–98)
MONOCYTES # BLD AUTO: 0.7 K/UL (ref 0.3–1)
MONOCYTES NFR BLD: 7.6 % (ref 4–15)
NEUTROPHILS # BLD AUTO: 7.3 K/UL (ref 1.8–7.7)
NEUTROPHILS NFR BLD: 79.5 % (ref 38–73)
NRBC BLD-RTO: 0 /100 WBC
PHOSPHATE SERPL-MCNC: 3.6 MG/DL (ref 2.7–4.5)
PLATELET # BLD AUTO: 403 K/UL (ref 150–450)
PMV BLD AUTO: 10 FL (ref 9.2–12.9)
POCT GLUCOSE: 141 MG/DL (ref 70–110)
POCT GLUCOSE: 150 MG/DL (ref 70–110)
POCT GLUCOSE: 176 MG/DL (ref 70–110)
POCT GLUCOSE: 189 MG/DL (ref 70–110)
POTASSIUM SERPL-SCNC: 2.9 MMOL/L (ref 3.5–5.1)
POTASSIUM SERPL-SCNC: 4.3 MMOL/L (ref 3.5–5.1)
PREALB SERPL-MCNC: 18 MG/DL (ref 20–43)
PROT SERPL-MCNC: 10.1 G/DL (ref 6–8.4)
PROTHROMBIN TIME: 21.4 SEC (ref 9–12.5)
RBC # BLD AUTO: 5.25 M/UL (ref 4.6–6.2)
SODIUM SERPL-SCNC: 129 MMOL/L (ref 136–145)
SODIUM SERPL-SCNC: 130 MMOL/L (ref 136–145)
WBC # BLD AUTO: 9.23 K/UL (ref 3.9–12.7)

## 2025-01-22 PROCEDURE — 99232 SBSQ HOSP IP/OBS MODERATE 35: CPT | Mod: ,,, | Performed by: STUDENT IN AN ORGANIZED HEALTH CARE EDUCATION/TRAINING PROGRAM

## 2025-01-22 PROCEDURE — 25000003 PHARM REV CODE 250: Performed by: STUDENT IN AN ORGANIZED HEALTH CARE EDUCATION/TRAINING PROGRAM

## 2025-01-22 PROCEDURE — 25000003 PHARM REV CODE 250

## 2025-01-22 PROCEDURE — 85730 THROMBOPLASTIN TIME PARTIAL: CPT | Performed by: STUDENT IN AN ORGANIZED HEALTH CARE EDUCATION/TRAINING PROGRAM

## 2025-01-22 PROCEDURE — 63600175 PHARM REV CODE 636 W HCPCS

## 2025-01-22 PROCEDURE — 86140 C-REACTIVE PROTEIN: CPT | Performed by: STUDENT IN AN ORGANIZED HEALTH CARE EDUCATION/TRAINING PROGRAM

## 2025-01-22 PROCEDURE — 25000003 PHARM REV CODE 250: Performed by: NURSE PRACTITIONER

## 2025-01-22 PROCEDURE — 80076 HEPATIC FUNCTION PANEL: CPT | Performed by: NURSE PRACTITIONER

## 2025-01-22 PROCEDURE — 84134 ASSAY OF PREALBUMIN: CPT | Performed by: STUDENT IN AN ORGANIZED HEALTH CARE EDUCATION/TRAINING PROGRAM

## 2025-01-22 PROCEDURE — 36415 COLL VENOUS BLD VENIPUNCTURE: CPT

## 2025-01-22 PROCEDURE — 83615 LACTATE (LD) (LDH) ENZYME: CPT | Performed by: STUDENT IN AN ORGANIZED HEALTH CARE EDUCATION/TRAINING PROGRAM

## 2025-01-22 PROCEDURE — 80048 BASIC METABOLIC PNL TOTAL CA: CPT | Mod: 91 | Performed by: NURSE PRACTITIONER

## 2025-01-22 PROCEDURE — 93750 INTERROGATION VAD IN PERSON: CPT | Mod: ,,, | Performed by: INTERNAL MEDICINE

## 2025-01-22 PROCEDURE — 85610 PROTHROMBIN TIME: CPT | Performed by: NURSE PRACTITIONER

## 2025-01-22 PROCEDURE — 20600001 HC STEP DOWN PRIVATE ROOM

## 2025-01-22 PROCEDURE — 25000003 PHARM REV CODE 250: Performed by: INTERNAL MEDICINE

## 2025-01-22 PROCEDURE — 85025 COMPLETE CBC W/AUTO DIFF WBC: CPT | Performed by: NURSE PRACTITIONER

## 2025-01-22 PROCEDURE — 36415 COLL VENOUS BLD VENIPUNCTURE: CPT | Performed by: STUDENT IN AN ORGANIZED HEALTH CARE EDUCATION/TRAINING PROGRAM

## 2025-01-22 PROCEDURE — 63600175 PHARM REV CODE 636 W HCPCS: Performed by: INTERNAL MEDICINE

## 2025-01-22 PROCEDURE — 27000248 HC VAD-ADDITIONAL DAY

## 2025-01-22 PROCEDURE — 80048 BASIC METABOLIC PNL TOTAL CA: CPT

## 2025-01-22 PROCEDURE — 83735 ASSAY OF MAGNESIUM: CPT | Performed by: NURSE PRACTITIONER

## 2025-01-22 PROCEDURE — 83880 ASSAY OF NATRIURETIC PEPTIDE: CPT | Performed by: STUDENT IN AN ORGANIZED HEALTH CARE EDUCATION/TRAINING PROGRAM

## 2025-01-22 PROCEDURE — 99233 SBSQ HOSP IP/OBS HIGH 50: CPT | Mod: ,,, | Performed by: INTERNAL MEDICINE

## 2025-01-22 PROCEDURE — 84100 ASSAY OF PHOSPHORUS: CPT | Performed by: STUDENT IN AN ORGANIZED HEALTH CARE EDUCATION/TRAINING PROGRAM

## 2025-01-22 RX ORDER — INSULIN ASPART 100 [IU]/ML
8 INJECTION, SOLUTION INTRAVENOUS; SUBCUTANEOUS
Status: DISCONTINUED | OUTPATIENT
Start: 2025-01-22 | End: 2025-01-25 | Stop reason: HOSPADM

## 2025-01-22 RX ORDER — POTASSIUM CHLORIDE 7.45 MG/ML
10 INJECTION INTRAVENOUS
Status: COMPLETED | OUTPATIENT
Start: 2025-01-22 | End: 2025-01-22

## 2025-01-22 RX ORDER — POTASSIUM CHLORIDE 750 MG/1
50 CAPSULE, EXTENDED RELEASE ORAL ONCE
Status: COMPLETED | OUTPATIENT
Start: 2025-01-22 | End: 2025-01-22

## 2025-01-22 RX ADMIN — HYPROMELLOSE 2910 1 DROP: 5 SOLUTION/ DROPS OPHTHALMIC at 08:01

## 2025-01-22 RX ADMIN — POTASSIUM CHLORIDE 10 MEQ: 7.46 INJECTION, SOLUTION INTRAVENOUS at 01:01

## 2025-01-22 RX ADMIN — PANTOPRAZOLE SODIUM 40 MG: 40 TABLET, DELAYED RELEASE ORAL at 09:01

## 2025-01-22 RX ADMIN — HYPROMELLOSE 2910 1 DROP: 5 SOLUTION/ DROPS OPHTHALMIC at 01:01

## 2025-01-22 RX ADMIN — WARFARIN SODIUM 1.5 MG: 1 TABLET ORAL at 04:01

## 2025-01-22 RX ADMIN — EMPAGLIFLOZIN 10 MG: 10 TABLET, FILM COATED ORAL at 09:01

## 2025-01-22 RX ADMIN — METHOCARBAMOL 500 MG: 500 TABLET ORAL at 01:01

## 2025-01-22 RX ADMIN — HYPROMELLOSE 2910 1 DROP: 5 SOLUTION/ DROPS OPHTHALMIC at 09:01

## 2025-01-22 RX ADMIN — POTASSIUM CHLORIDE 10 MEQ: 7.46 INJECTION, SOLUTION INTRAVENOUS at 09:01

## 2025-01-22 RX ADMIN — GABAPENTIN 100 MG: 100 CAPSULE ORAL at 08:01

## 2025-01-22 RX ADMIN — INSULIN ASPART 8 UNITS: 100 INJECTION, SOLUTION INTRAVENOUS; SUBCUTANEOUS at 02:01

## 2025-01-22 RX ADMIN — Medication 800 MG: at 08:01

## 2025-01-22 RX ADMIN — INSULIN ASPART 8 UNITS: 100 INJECTION, SOLUTION INTRAVENOUS; SUBCUTANEOUS at 07:01

## 2025-01-22 RX ADMIN — TORSEMIDE 60 MG: 20 TABLET ORAL at 04:01

## 2025-01-22 RX ADMIN — LEVETIRACETAM 1500 MG: 500 TABLET, FILM COATED ORAL at 09:01

## 2025-01-22 RX ADMIN — METHOCARBAMOL 500 MG: 500 TABLET ORAL at 09:01

## 2025-01-22 RX ADMIN — POTASSIUM CHLORIDE 10 MEQ: 7.46 INJECTION, SOLUTION INTRAVENOUS at 11:01

## 2025-01-22 RX ADMIN — DULOXETINE HYDROCHLORIDE 30 MG: 30 CAPSULE, DELAYED RELEASE ORAL at 09:01

## 2025-01-22 RX ADMIN — METHOCARBAMOL 500 MG: 500 TABLET ORAL at 04:01

## 2025-01-22 RX ADMIN — INSULIN ASPART 8 UNITS: 100 INJECTION, SOLUTION INTRAVENOUS; SUBCUTANEOUS at 09:01

## 2025-01-22 RX ADMIN — POTASSIUM CHLORIDE 10 MEQ: 7.46 INJECTION, SOLUTION INTRAVENOUS at 10:01

## 2025-01-22 RX ADMIN — POTASSIUM CHLORIDE 50 MEQ: 750 CAPSULE, EXTENDED RELEASE ORAL at 06:01

## 2025-01-22 RX ADMIN — HYPROMELLOSE 2910 1 DROP: 5 SOLUTION/ DROPS OPHTHALMIC at 04:01

## 2025-01-22 RX ADMIN — GABAPENTIN 400 MG: 400 CAPSULE ORAL at 08:01

## 2025-01-22 RX ADMIN — Medication 800 MG: at 09:01

## 2025-01-22 RX ADMIN — METHOCARBAMOL 500 MG: 500 TABLET ORAL at 08:01

## 2025-01-22 RX ADMIN — AMIODARONE HYDROCHLORIDE 200 MG: 200 TABLET ORAL at 09:01

## 2025-01-22 RX ADMIN — Medication 6 MG: at 08:01

## 2025-01-22 RX ADMIN — ATORVASTATIN CALCIUM 40 MG: 40 TABLET, FILM COATED ORAL at 09:01

## 2025-01-22 RX ADMIN — AMLODIPINE BESYLATE 10 MG: 10 TABLET ORAL at 09:01

## 2025-01-22 RX ADMIN — INSULIN GLARGINE 10 UNITS: 100 INJECTION, SOLUTION SUBCUTANEOUS at 09:01

## 2025-01-22 RX ADMIN — GABAPENTIN 100 MG: 100 CAPSULE ORAL at 09:01

## 2025-01-22 RX ADMIN — EPLERENONE 50 MG: 25 TABLET, FILM COATED ORAL at 09:01

## 2025-01-22 RX ADMIN — DEXTROSE MONOHYDRATE 6 G: 25000 INJECTION, SOLUTION INTRAVENOUS at 04:01

## 2025-01-22 RX ADMIN — LEVETIRACETAM 1500 MG: 500 TABLET, FILM COATED ORAL at 08:01

## 2025-01-22 RX ADMIN — ASPIRIN 81 MG: 81 TABLET, COATED ORAL at 09:01

## 2025-01-22 NOTE — PROGRESS NOTES
[facility] - Cardiac Intensive Care  Heart Transplant  Progress Note    Patient Name: Kevan Queen  MRN: [id number]  Admission Date: 1/15/2025  Hospital Length of Stay: 6 days  Attending Physician: Josh Ryan, *  Primary Care Provider: Rosio Armendariz FNP  Principal Problem:Acute on chronic systolic CHF (congestive heart failure)    Subjective:   Interval History: NAOEN. Blood cultures growing staph. ID following and recommend to continue Ancef. TTE 1/19 with EF 10 -15%. G1 DD. RV systolic function severely reduced. Mod to severe TR. PASP  31 mmHg. IVC/SVC: 15 mmHg. No evidence of vegetation.     Cr stable - K repleted aggressively. Cont po torsemide 60mg BID, pt responding well, no peripheral edema but JVD still pressent and Cr downtrending with current regimen.     Paracentesis without signs of SBP on labs, cultures pending. Had a Low flow alarm during rounds - Doppler 78.     Scheduled Meds:   amiodarone  200 mg Oral Daily    amLODIPine  10 mg Oral Daily    artificial tears  1 drop Both Eyes QID WAKE    aspirin  81 mg Oral Daily    atorvastatin  40 mg Oral Daily    ceFAZolin (Ancef) 6 g in D5W 500 mL CONTINUOUS INFUSION  6 g Intravenous Q24H    DULoxetine  30 mg Oral Daily    empagliflozin  10 mg Oral Daily    eplerenone  50 mg Oral Daily    gabapentin  100 mg Oral BID    And    gabapentin  400 mg Oral QHS    insulin aspart U-100  10 Units Subcutaneous TIDWM    insulin glargine U-100  10 Units Subcutaneous Daily    levETIRAcetam  1,500 mg Oral BID    magnesium oxide  800 mg Oral BID    methocarbamoL  500 mg Oral QID    pantoprazole  40 mg Oral Daily    polyethylene glycol  17 g Oral Daily    potassium chloride  20 mEq Oral BID    torsemide  60 mg Oral BID loop    warfarin  1.5 mg Oral Daily     PRN Meds:  Current Facility-Administered Medications:     acetaminophen, 650 mg, Oral, Q6H PRN    dextrose 50%, 12.5 g, Intravenous, PRN    dextrose 50%, 25 g, Intravenous, PRN    glucagon (human  recombinant), 1 mg, Intramuscular, PRN    glucose, 16 g, Oral, PRN    glucose, 24 g, Oral, PRN    insulin aspart U-100, 0-5 Units, Subcutaneous, QID (AC + HS) PRN    melatonin, 6 mg, Oral, Nightly PRN    ondansetron, 4 mg, Oral, Q8H PRN    senna-docusate 8.6-50 mg, 1 tablet, Oral, Daily PRN    Review of patient's allergies indicates:   Allergen Reactions    Bumex [bumetanide] Hives     Objective:     Vital Signs (Most Recent):  Temp: 98 °F (36.7 °C) (01/21/25 1535)  Pulse: 81 (01/21/25 1622)  Resp: 20 (01/21/25 1535)  BP: (!) 80/0 (01/21/25 1535)  SpO2: 98 % (01/21/25 1535) Vital Signs (24h Range):  Temp:  [97.5 °F (36.4 °C)-98 °F (36.7 °C)] 98 °F (36.7 °C)  Pulse:  [75-85] 81  Resp:  [17-20] 20  SpO2:  [97 %-100 %] 98 %  BP: ()/(0-76) 80/0     Patient Vitals for the past 72 hrs (Last 3 readings):   Weight   01/21/25 0410 58.9 kg (129 lb 13.6 oz)   01/20/25 0745 58.7 kg (129 lb 6.6 oz)   01/19/25 0900 68 kg (150 lb)     Body mass index is 16.23 kg/m².      Intake/Output Summary (Last 24 hours) at 1/21/2025 1820  Last data filed at 1/21/2025 1749  Gross per 24 hour   Intake 2200 ml   Output 4600 ml   Net -2400 ml       Telemetry: NSR 90s       Physical Exam  Vitals and nursing note reviewed.   Constitutional:       Appearance: He is well-developed.   HENT:      Head: Normocephalic.   Eyes:      Pupils: Pupils are equal, round, and reactive to light.   Cardiovascular:      Rate and Rhythm: Normal rate and regular rhythm.      Heart sounds: Murmur heard.      Comments: VAD hum, elevated JVD  Pulmonary:      Effort: Pulmonary effort is normal.      Breath sounds: Normal breath sounds.   Abdominal:      General: Bowel sounds are normal. There is distension.      Palpations: Abdomen is soft.   Musculoskeletal:         General: Normal range of motion.      Cervical back: Normal range of motion and neck supple.      Right lower leg: No edema.      Left lower leg: No edema.   Skin:     General: Skin is warm and dry.    Neurological:      Mental Status: He is alert and oriented to person, place, and time.   Psychiatric:         Behavior: Behavior normal.            Significant Labs:  CBC:  Recent Labs   Lab 01/19/25  0318 01/20/25  0527 01/21/25  0420   WBC 10.55 8.64 9.61   RBC 4.93 5.45 5.16   HGB 8.9* 9.9* 9.2*   HCT 30.4* 33.4* 31.9*    314 344   MCV 62* 61* 62*   MCH 18.1* 18.2* 17.8*   MCHC 29.3* 29.6* 28.8*     BNP:  Recent Labs   Lab 01/15/25  0436 01/17/25  0512 01/20/25  0527   BNP 1,157* 717* 526*     CMP:  Recent Labs   Lab 01/15/25  0740 01/16/25  0421 01/17/25  0512 01/17/25  1315 01/18/25  1122 01/18/25  1343 01/20/25  0527 01/20/25  0955 01/20/25  1934 01/21/25  0420   *   < > 129*   < >  --    < > 123* 198* 174* 113*   CALCIUM 8.3*   < > 9.6   < >  --    < > 9.1 9.2 9.1 9.4   ALBUMIN 2.2*  --  2.8*  --  2.5*  --  2.8*  --   --   --    PROT 7.9  --  10.4*  --  9.6*  --  9.9*  --   --   --    *   < > 132*   < >  --    < > 129* 131* 128* 129*   K 3.4*   < > 3.0*   < >  --    < > 2.7* 3.3* 3.9 3.1*   CO2 25   < > 30*   < >  --    < > 30* 28 26 31*      < > 88*   < >  --    < > 86* 88* 89* 86*   BUN 19   < > 23*   < >  --    < > 31* 33* 36* 35*   CREATININE 1.3   < > 1.7*   < >  --    < > 1.5* 1.6* 1.7* 1.5*   ALKPHOS 141  --  193*  --  173*  --  197*  --   --   --    ALT 14  --  17  --   --   --  6*  --   --   --    AST 31  --  35  --   --   --  27  --   --   --    BILITOT 2.3*  --  1.9*  --   --   --  1.4*  --   --   --     < > = values in this interval not displayed.      Coagulation:   Recent Labs   Lab 01/19/25 0318 01/20/25 0527 01/21/25 0420   INR 2.1* 2.2* 2.1*   APTT 33.3* 33.3* 33.6*     LDH:  Recent Labs   Lab 01/19/25 0318 01/20/25 0527 01/21/25  0420   * 244 272*     Microbiology:  Microbiology Results (last 7 days)       Procedure Component Value Units Date/Time    Blood culture [[id number]] Collected: 01/16/25 1006    Order Status: Completed Specimen: Blood Updated:  01/21/25 1212     Blood Culture, Routine No growth after 5 days.    Blood culture [[id number]] Collected: 01/16/25 1006    Order Status: Completed Specimen: Blood Updated: 01/21/25 1212     Blood Culture, Routine No growth after 5 days.    Blood culture [[id number]] Collected: 01/19/25 0318    Order Status: Completed Specimen: Blood Updated: 01/21/25 0812     Blood Culture, Routine No Growth to date      No Growth to date      No Growth to date    Blood culture [[id number]] Collected: 01/19/25 0318    Order Status: Completed Specimen: Blood Updated: 01/21/25 0812     Blood Culture, Routine No Growth to date      No Growth to date      No Growth to date    (rule out SBP) Culture, Anaerobic [[id number]] Collected: 01/18/25 1454    Order Status: Completed Specimen: Ascites Updated: 01/20/25 0651     Anaerobic Culture Culture in progress    Narrative:      To rule out SBP order labs: Aerobic culture [VQI960],  Culture, Anaerobic [XNF218], Gram stain [JEG847], Albumin  [DFI368], Protein [SNH623], LDH [XPY872], WBC \T\ Dff  [PRY8792].    (rule out SBP) Aerobic culture [[id number]] Collected: 01/18/25 1454    Order Status: Completed Specimen: Ascites Updated: 01/20/25 0613     Aerobic Bacterial Culture No growth    Narrative:      To rule out SBP order labs: Aerobic culture [VRQ392],  Culture, Anaerobic [DAU334], Gram stain [YTX495], Albumin  [JMY031], Protein [MCD793], LDH [NSB417], WBC \T\ Dff  [XNR2714].    (rule out SBP) Gram stain [[id number]] Collected: 01/18/25 1454    Order Status: Completed Specimen: Ascites Updated: 01/18/25 2124     Gram Stain Result No WBC's      No organisms seen    Narrative:      To rule out SBP order labs: Aerobic culture [VAS245],  Culture, Anaerobic [IVA648], Gram stain [LDG683], Albumin  [YMG957], Protein [FQR362], LDH [APD009], WBC \T\ Dff  [ECD3871].    Fungus culture [[id number]] Collected: 01/18/25 1454    Order Status: Sent Specimen: Ascites Updated: 01/18/25 1531    Gram stain  [[id number]]     Order Status: Canceled Specimen: Body Fluid     Anaerobic culture [[id number]]     Order Status: Canceled Specimen: Body Fluid     Aerobic culture [[id number]]     Order Status: Canceled Specimen: Peritoneal Fluid     MRSA Screen by PCR [[id number]] Collected: 01/15/25 1630    Order Status: Completed Specimen: Nasal Swab Updated: 01/15/25 1852     MRSA SCREEN BY PCR Not Detected    Culture, Anaerobe [[id number]]     Order Status: Canceled Specimen: Body Fluid from Abdomen     Aerobic culture [[id number]]     Order Status: Canceled Specimen: Incision site             I have reviewed all pertinent labs within the past 24 hours.    Estimated Creatinine Clearance: 55.1 mL/min (A) (based on SCr of 1.5 mg/dL (H)).    Diagnostic Results:  I have reviewed all pertinent imaging results/findings within the past 24 hours.  Assessment and Plan:     No notes on file    * Acute on chronic systolic CHF (congestive heart failure)  -NICM  -Last 2D Echo 1/19/25 with EF 10 -15%. G1 DD. RV systolic function severely reduced. Mod to severe TR. PASP  31 mmHg. IVC/SVC: 15 mmHg.  - Previous Echo 8/26/24 : LVEF 5-10%, LVEDD  7.2 cm  -S/p diuresis with Lasix gtt.  cont Torsemide 60mg BID - I/O: negative 2L for yesterday.   -GDMT with Eplerenone  -2g Na dietary restriction, 1500 mL fluid restriction, strict I/Os      LVAD (left ventricular assist device) present  HeartMate 3 Implanted 6/23/2022 with early RV failure requiring RVAD with ProTek Duo   -Continue Coumadin,  Goal INR 2.0-3.0 . Therapeutic today.  Previous home regimen 1.5mg Qdaily  -Antiplatelets ASA 81 mg, on hold 2/2 GIB history  -LDH is stable overall today. Will continue to monitor daily.  -Speed set at 5100  -Interrogation notable for PI events with 1 LFA on 1/20/25  -Not listed for OHTx    Procedure: Device Interrogation Including analysis of device parameters  Current Settings: Ventricular Assist Device        1/21/2025     3:35 PM 1/21/2025    11:27 AM  1/21/2025     9:00 AM 1/21/2025     4:00 AM 1/20/2025    11:55 PM 1/20/2025     7:54 PM 1/20/2025     4:00 PM   TXP LVAD INTERROGATIONS   Type HeartMate3 HeartMate3 HeartMate3 HeartMate3 HeartMate3 HeartMate3 HeartMate3   Flow 4.1 4 4.1 4.1 3.7 3.8 3.9   Speed 5100 5100 5150 5150 5100 5100 5100   PI 5.1 5.3 5.3 5.3 7 6.5 6.1   Power (Serna) 3.6 3.6 3.8 3.8 3.6 3.6 3.7   LSL    4700 4700 4700 4700   Pulsatility Intermittent pulse Intermittent pulse Intermittent pulse Intermittent pulse Intermittent pulse Intermittent pulse Intermittent pulse         Ascites  -Will monitor with diuresis   - Labs negative for SBP - follow cultures - NGTD    Hypokalemia  Replace and monitor per ptotocol    HTN (hypertension)  -On home dose of Amlodipine and Eplerenone    Chronic anticoagulation  -See above VAD    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   -Repeat Blood Cx 1/16 negative thus far  -Started on Vanc/Zosyn.  -ID consult.   -Vanc and Zosyn changed to Ancef  -TTE and ophthalmology consult ordered per ID recommendations. No vegetation on TTE and no infectious etiology of blurred vision per ophtho   -diagnostic paracentesis -negative so far, but cultures pending.     Right heart failure secondary to left heart failure-post LVAD surgery  -Weaned off inotropes 8/2024  -See above heart failure     Temporal lobe seizure  -On home dose of Shanthi Ayala MD  Heart Transplant  [facility] - Cardiac Intensive Care

## 2025-01-22 NOTE — CARE UPDATE
BG goal 140-180    -A1C   Lab Results   Component Value Date    HGBA1C 10.6 (H) 12/31/2024       -HOME REGIMEN:   - Lantus 20 units daily  - Novolog SSI with meals   - Novolog SSI  150 - 200 + 2 unit    201 - 250 + 4 units    251 - 300 + 6 units    301 - 350 + 8 units       > 350   + 10 units  - Jardiance 10 mg    -INPATIENT REGIMEN: see below    -GLUCOSE TREND FOR THE PAST 24HRS:    -Hypoglycemia noted yesterday evening     -TOLERATING 100% OF PO DIET     -Diet: Diet Cardiac Low Sodium,2gm, Double Portions; Fluid - 2000mL      -Steroids - n/a    -Tube Feeds - n/a      Remains in CICU. BG at and below goal on current SQ insulin regimen. Hypoglycemia (BG 66) noted yesterday evening. Will lower insulin dosing today.       Plan:  - Jardiance 10 mg daily (home medication; started per primary team)  -Continue Lantus 10 units once daily  - Decrease Novolog to 8 units TID with meals (20% dose decrease)   -Continue Low Dose Correction Scale   - POCT Glucose before meals and at bedtime  - Hypoglycemia protocol in place      Discharge planning: tbd    Endocrine to continue to follow    ** Please call Endocrine for any BG related issues **

## 2025-01-22 NOTE — NURSING
Blood Glucose 66 last night - asymptomatic. Patient ate sandwich and juice, repeat blood glucose 178.

## 2025-01-22 NOTE — PLAN OF CARE
Problem: Heart Failure  Goal: Optimal Coping  Outcome: Progressing  Intervention: Support Psychosocial Response  Flowsheets (Taken 1/22/2025 0124)  Supportive Measures:   active listening utilized   verbalization of feelings encouraged  Goal: Optimal Cardiac Output  Outcome: Progressing  Intervention: Optimize Cardiac Output  Flowsheets (Taken 1/22/2025 0124)  Environmental Support:   calm environment promoted   rest periods encouraged  Goal: Optimal Functional Ability  Outcome: Progressing  Intervention: Optimize Functional Ability  Flowsheets (Taken 1/22/2025 0124)  Self-Care Promotion:   independence encouraged   BADL personal objects within reach  Activity Management: Ambulated -L4  Goal: Fluid and Electrolyte Balance  Outcome: Progressing  Intervention: Monitor and Manage Fluid and Electrolyte Balance  Flowsheets (Taken 1/22/2025 0124)  Fluid/Electrolyte Management:   electrolyte supplement initiated   fluids restricted

## 2025-01-22 NOTE — ASSESSMENT & PLAN NOTE
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 Therapeutic today.  Previous home regimen 1.5mg Qdaily  -Antiplatelets ASA 81 mg, on hold 2/2 GIB history  -LDH is stable overall today. Will continue to monitor daily.  -Speed set at 5100  -Interrogation notable for PI events with 1 LFA on 1/20/25  -Not listed for OHTx    Procedure: Device Interrogation Including analysis of device parameters  Current Settings: Ventricular Assist Device        1/21/2025     3:35 PM 1/21/2025    11:27 AM 1/21/2025     9:00 AM 1/21/2025     4:00 AM 1/20/2025    11:55 PM 1/20/2025     7:54 PM 1/20/2025     4:00 PM   TXP LVAD INTERROGATIONS   Type HeartMate3 HeartMate3 HeartMate3 HeartMate3 HeartMate3 HeartMate3 HeartMate3   Flow 4.1 4 4.1 4.1 3.7 3.8 3.9   Speed 5100 5100 5150 5150 5100 5100 5100   PI 5.1 5.3 5.3 5.3 7 6.5 6.1   Power (Serna) 3.6 3.6 3.8 3.8 3.6 3.6 3.7   LSL    4700 4700 4700 4700   Pulsatility Intermittent pulse Intermittent pulse Intermittent pulse Intermittent pulse Intermittent pulse Intermittent pulse Intermittent pulse

## 2025-01-22 NOTE — PROGRESS NOTES
01/22/2025  Ruma Li    Current provider:  Marlo Marques MD    Device interrogation:      1/22/2025    11:19 AM 1/22/2025     9:00 AM 1/22/2025     4:22 AM 1/22/2025    12:00 AM 1/21/2025     8:16 PM 1/21/2025     3:35 PM 1/21/2025    11:27 AM   TXP LVAD INTERROGATIONS   Type HeartMate3 HeartMate3 HeartMate3 HeartMate3 HeartMate3 HeartMate3 HeartMate3   Flow 3.9 4.2 4.5 40 4 4.1 4   Speed 5100 5100 5150 5100 5100 5100 5100   PI 5.6 4.8 3.9 5.2 5.7 5.1 5.3   Power (Serna) 3.6 3.6 3.6 3.6 3.6 3.6 3.6   LSL   4700 4700 4700     Pulsatility Intermittent pulse Intermittent pulse Intermittent pulse Intermittent pulse Intermittent pulse Intermittent pulse Intermittent pulse          Rounded on Kevan Queen to ensure all mechanical assist device settings (IABP or VAD) were appropriate and all parameters were within limits.  I was able to ensure all back up equipment was present, the staff had no issues, and the Perfusion Department daily rounding was complete.      For implantable VADs: Interrogation of Ventricular assist device was performed with analysis of device parameters and review of device function. I have personally reviewed the interrogation findings and agree with findings as stated.     In emergency, the nursing units have been notified to contact the perfusion department either by:  Calling [phone] from 630am to 4pm Mon thru Fri, utilizing the On-Call Finder functionality of Epic and searching for Perfusion, or by contacting the hospital  from 4pm to 630am and on weekends and asking to speak with the perfusionist on call.    11:31 AM

## 2025-01-22 NOTE — SUBJECTIVE & OBJECTIVE
Interval History: NAOEN. Blood cultures growing staph. ID following and recommend to continue Ancef. TTE 1/19 with EF 10 -15%. G1 DD. RV systolic function severely reduced. Mod to severe TR. PASP  31 mmHg. IVC/SVC: 15 mmHg. No evidence of vegetation.     Cr stable - K repleted aggressively. Cont po torsemide 60mg BID, pt responding well, no peripheral edema but JVD still pressent and Cr downtrending with current regimen.     Paracentesis without signs of SBP on labs, cultures pending. Had a Low flow alarm during rounds - Doppler 78.     Scheduled Meds:   amiodarone  200 mg Oral Daily    amLODIPine  10 mg Oral Daily    artificial tears  1 drop Both Eyes QID WAKE    aspirin  81 mg Oral Daily    atorvastatin  40 mg Oral Daily    ceFAZolin (Ancef) 6 g in D5W 500 mL CONTINUOUS INFUSION  6 g Intravenous Q24H    DULoxetine  30 mg Oral Daily    empagliflozin  10 mg Oral Daily    eplerenone  50 mg Oral Daily    gabapentin  100 mg Oral BID    And    gabapentin  400 mg Oral QHS    insulin aspart U-100  10 Units Subcutaneous TIDWM    insulin glargine U-100  10 Units Subcutaneous Daily    levETIRAcetam  1,500 mg Oral BID    magnesium oxide  800 mg Oral BID    methocarbamoL  500 mg Oral QID    pantoprazole  40 mg Oral Daily    polyethylene glycol  17 g Oral Daily    potassium chloride  20 mEq Oral BID    torsemide  60 mg Oral BID loop    warfarin  1.5 mg Oral Daily     PRN Meds:  Current Facility-Administered Medications:     acetaminophen, 650 mg, Oral, Q6H PRN    dextrose 50%, 12.5 g, Intravenous, PRN    dextrose 50%, 25 g, Intravenous, PRN    glucagon (human recombinant), 1 mg, Intramuscular, PRN    glucose, 16 g, Oral, PRN    glucose, 24 g, Oral, PRN    insulin aspart U-100, 0-5 Units, Subcutaneous, QID (AC + HS) PRN    melatonin, 6 mg, Oral, Nightly PRN    ondansetron, 4 mg, Oral, Q8H PRN    senna-docusate 8.6-50 mg, 1 tablet, Oral, Daily PRN    Review of patient's allergies indicates:   Allergen Reactions    Bumex  [bumetanide] Hives     Objective:     Vital Signs (Most Recent):  Temp: 98 °F (36.7 °C) (01/21/25 1535)  Pulse: 81 (01/21/25 1622)  Resp: 20 (01/21/25 1535)  BP: (!) 80/0 (01/21/25 1535)  SpO2: 98 % (01/21/25 1535) Vital Signs (24h Range):  Temp:  [97.5 °F (36.4 °C)-98 °F (36.7 °C)] 98 °F (36.7 °C)  Pulse:  [75-85] 81  Resp:  [17-20] 20  SpO2:  [97 %-100 %] 98 %  BP: ()/(0-76) 80/0     Patient Vitals for the past 72 hrs (Last 3 readings):   Weight   01/21/25 0410 58.9 kg (129 lb 13.6 oz)   01/20/25 0745 58.7 kg (129 lb 6.6 oz)   01/19/25 0900 68 kg (150 lb)     Body mass index is 16.23 kg/m².      Intake/Output Summary (Last 24 hours) at 1/21/2025 1820  Last data filed at 1/21/2025 1749  Gross per 24 hour   Intake 2200 ml   Output 4600 ml   Net -2400 ml       Telemetry: NSR 90s       Physical Exam  Vitals and nursing note reviewed.   Constitutional:       Appearance: He is well-developed.   HENT:      Head: Normocephalic.   Eyes:      Pupils: Pupils are equal, round, and reactive to light.   Cardiovascular:      Rate and Rhythm: Normal rate and regular rhythm.      Heart sounds: Murmur heard.      Comments: VAD hum, elevated JVD  Pulmonary:      Effort: Pulmonary effort is normal.      Breath sounds: Normal breath sounds.   Abdominal:      General: Bowel sounds are normal. There is distension.      Palpations: Abdomen is soft.   Musculoskeletal:         General: Normal range of motion.      Cervical back: Normal range of motion and neck supple.      Right lower leg: No edema.      Left lower leg: No edema.   Skin:     General: Skin is warm and dry.   Neurological:      Mental Status: He is alert and oriented to person, place, and time.   Psychiatric:         Behavior: Behavior normal.            Significant Labs:  CBC:  Recent Labs   Lab 01/19/25  0318 01/20/25  0527 01/21/25  0420   WBC 10.55 8.64 9.61   RBC 4.93 5.45 5.16   HGB 8.9* 9.9* 9.2*   HCT 30.4* 33.4* 31.9*    314 344   MCV 62* 61* 62*   MCH  18.1* 18.2* 17.8*   MCHC 29.3* 29.6* 28.8*     BNP:  Recent Labs   Lab 01/15/25  0436 01/17/25 0512 01/20/25 0527   BNP 1,157* 717* 526*     CMP:  Recent Labs   Lab 01/15/25  0740 01/16/25  0421 01/17/25  0512 01/17/25  1315 01/18/25  1122 01/18/25  1343 01/20/25  0527 01/20/25  0955 01/20/25  1934 01/21/25 0420   *   < > 129*   < >  --    < > 123* 198* 174* 113*   CALCIUM 8.3*   < > 9.6   < >  --    < > 9.1 9.2 9.1 9.4   ALBUMIN 2.2*  --  2.8*  --  2.5*  --  2.8*  --   --   --    PROT 7.9  --  10.4*  --  9.6*  --  9.9*  --   --   --    *   < > 132*   < >  --    < > 129* 131* 128* 129*   K 3.4*   < > 3.0*   < >  --    < > 2.7* 3.3* 3.9 3.1*   CO2 25   < > 30*   < >  --    < > 30* 28 26 31*      < > 88*   < >  --    < > 86* 88* 89* 86*   BUN 19   < > 23*   < >  --    < > 31* 33* 36* 35*   CREATININE 1.3   < > 1.7*   < >  --    < > 1.5* 1.6* 1.7* 1.5*   ALKPHOS 141  --  193*  --  173*  --  197*  --   --   --    ALT 14  --  17  --   --   --  6*  --   --   --    AST 31  --  35  --   --   --  27  --   --   --    BILITOT 2.3*  --  1.9*  --   --   --  1.4*  --   --   --     < > = values in this interval not displayed.      Coagulation:   Recent Labs   Lab 01/19/25 0318 01/20/25 0527 01/21/25 0420   INR 2.1* 2.2* 2.1*   APTT 33.3* 33.3* 33.6*     LDH:  Recent Labs   Lab 01/19/25 0318 01/20/25 0527 01/21/25 0420   * 244 272*     Microbiology:  Microbiology Results (last 7 days)       Procedure Component Value Units Date/Time    Blood culture [[id number]] Collected: 01/16/25 1006    Order Status: Completed Specimen: Blood Updated: 01/21/25 1212     Blood Culture, Routine No growth after 5 days.    Blood culture [[id number]] Collected: 01/16/25 1006    Order Status: Completed Specimen: Blood Updated: 01/21/25 1212     Blood Culture, Routine No growth after 5 days.    Blood culture [[id number]] Collected: 01/19/25 0318    Order Status: Completed Specimen: Blood Updated: 01/21/25 0812     Blood  Culture, Routine No Growth to date      No Growth to date      No Growth to date    Blood culture [[id number]] Collected: 01/19/25 0318    Order Status: Completed Specimen: Blood Updated: 01/21/25 0812     Blood Culture, Routine No Growth to date      No Growth to date      No Growth to date    (rule out SBP) Culture, Anaerobic [[id number]] Collected: 01/18/25 1454    Order Status: Completed Specimen: Ascites Updated: 01/20/25 0651     Anaerobic Culture Culture in progress    Narrative:      To rule out SBP order labs: Aerobic culture [[id number]],  Culture, Anaerobic [DLO286], Gram stain [IWS979], Albumin  [LFW822], Protein [XVI017], LDH [PSN490], WBC \T\ Dff  [OGO1442].    (rule out SBP) Aerobic culture [[id number]] Collected: 01/18/25 1454    Order Status: Completed Specimen: Ascites Updated: 01/20/25 0613     Aerobic Bacterial Culture No growth    Narrative:      To rule out SBP order labs: Aerobic culture [[id number]],  Culture, Anaerobic [AYF029], Gram stain [FKN881], Albumin  [MLU247], Protein [JGS641], LDH [SNG151], WBC \T\ Dff  [TNT8447].    (rule out SBP) Gram stain [[id number]] Collected: 01/18/25 1454    Order Status: Completed Specimen: Ascites Updated: 01/18/25 2124     Gram Stain Result No WBC's      No organisms seen    Narrative:      To rule out SBP order labs: Aerobic culture [CRZ196],  Culture, Anaerobic [MUP912], Gram stain [BPX088], Albumin  [VSI844], Protein [GRR265], LDH [XOD797], WBC \T\ Dff  [ZUS7913].    Fungus culture [[id number]] Collected: 01/18/25 1454    Order Status: Sent Specimen: Ascites Updated: 01/18/25 1531    Gram stain [[id number]]     Order Status: Canceled Specimen: Body Fluid     Anaerobic culture [[id number]]     Order Status: Canceled Specimen: Body Fluid     Aerobic culture [[id number]]     Order Status: Canceled Specimen: Peritoneal Fluid     MRSA Screen by PCR [[id number]] Collected: 01/15/25 1630    Order Status: Completed Specimen: Nasal Swab Updated: 01/15/25 1852     MRSA  SCREEN BY PCR Not Detected    Culture, Anaerobe [[id number]]     Order Status: Canceled Specimen: Body Fluid from Abdomen     Aerobic culture [[id number]]     Order Status: Canceled Specimen: Incision site             I have reviewed all pertinent labs within the past 24 hours.    Estimated Creatinine Clearance: 55.1 mL/min (A) (based on SCr of 1.5 mg/dL (H)).    Diagnostic Results:  I have reviewed all pertinent imaging results/findings within the past 24 hours.

## 2025-01-22 NOTE — PLAN OF CARE
Problem: Adult Inpatient Plan of Care  Goal: Absence of Hospital-Acquired Illness or Injury  Outcome: Progressing     Problem: Diabetes Comorbidity  Goal: Blood Glucose Level Within Targeted Range  Outcome: Progressing     Problem: Sepsis/Septic Shock  Goal: Absence of Bleeding  Outcome: Progressing  Goal: Blood Glucose Level Within Targeted Range  Outcome: Progressing     Problem: Ventricular Assist Device  Goal: Optimal Adjustment to Device  Outcome: Progressing  Goal: Absence of Bleeding  Outcome: Progressing     Problem: Fall Injury Risk  Goal: Absence of Fall and Fall-Related Injury  Outcome: Progressing

## 2025-01-22 NOTE — PROGRESS NOTES
"   01/21/25 1535        VAD 06/29/22 1115 Left ventricular assist device HeartMate 3   Placement Date/Time: 06/29/22 1115   Present Prior to Hospital Arrival?: No  Inserted by: MD  VAD Type: Left ventricular assist device  VAD Brand: HeartMate 3   Site Location Abdomen right   Site Assessment Intact;Draining  (scant bloody/yellow drainage)   Driveline Exit Site 2   Driveline Assessment Free of Kinks;Intact   Dressing Status Clean;Dry;Intact   Dressing Intervention Sterile dressing change   Performed By RN   Dressing Change Schedule Daily   Dressing Change Due 01/22/25   Integrity dry;intact   Driveline [place] in use [place] askew   Condition CDI   Date changed 01/21/25     LVAD dressing change completed using sterile technique with kit. DLES is a "2" with scant bloody/yellow drainage noted on the drain sponge. Tolerated without any complication. No redness, or tenderness noted.   "

## 2025-01-22 NOTE — ASSESSMENT & PLAN NOTE
-Blood Cx 1/14 growing staph.   -Repeat Blood Cx 1/16 negative thus far  -Started on Vanc/Zosyn.  -ID consult.   -Vanc and Zosyn changed to Ancef  -TTE and ophthalmology consult ordered per ID recommendations. No vegetation on TTE and no infectious etiology of blurred vision per ophtho   -diagnostic paracentesis -negative so far, but cultures pending.

## 2025-01-22 NOTE — ASSESSMENT & PLAN NOTE
-NICM  -Last 2D Echo 1/19/25 with EF 10 -15%. G1 DD. RV systolic function severely reduced. Mod to severe TR. PASP  31 mmHg. IVC/SVC: 15 mmHg.  - Previous Echo 8/26/24 : LVEF 5-10%, LVEDD  7.2 cm  -S/p diuresis with Lasix gtt.  cont Torsemide 60mg BID - I/O: negative 2L for yesterday.   -GDMT with Eplerenone  -2g Na dietary restriction, 1500 mL fluid restriction, strict I/Os

## 2025-01-22 NOTE — PLAN OF CARE
ID staff plan of care, chart/lab reviewed due to inclement weather/road closures:    No fevers documented overnight. Blcx remain no growth to date.     Recommendations:  -continue cefazolin for prior isolated MSSA, anticipate 6w of therapy from negative blood cx, maria isabel 2/26      Above d/w primary team. ID will continue to follow      35 minutes of total time spent on the encounter, which includes face to face time and non-face to face time preparing to see the patient (eg, review of tests), obtaining and/or reviewing separately obtained history, documenting clinical information in the electronic or other health record, independently interpreting results (not separately reported) and communicating results to the patient/family/caregiver, or care coordination (not separately reported).

## 2025-01-22 NOTE — PROGRESS NOTES
"   01/22/25 1548        VAD 06/29/22 1115 Left ventricular assist device HeartMate 3   Placement Date/Time: 06/29/22 1115   Present Prior to Hospital Arrival?: No  Inserted by: MD  VAD Type: Left ventricular assist device  VAD Brand: HeartMate 3   Site Location Abdomen right   Site Assessment Dry;Intact   Driveline Exit Site 2   Driveline Assessment Free of Kinks;Intact   Dressing Status Clean;Dry;Intact   Dressing Intervention Integrity maintained   Performed By RN   Dressing Change Schedule Daily   Dressing Change Due 01/23/25   Integrity dry;intact   Driveline [place] in use [place] askew   Condition CDI     LVAD dressing change completed using sterile technique with kit. DLES is a "2" with minimal drainage noted on the drain sponge. Tolerated without any complication. No redness, or tenderness noted.   "

## 2025-01-23 LAB
ANION GAP SERPL CALC-SCNC: 12 MMOL/L (ref 8–16)
ANION GAP SERPL CALC-SCNC: 13 MMOL/L (ref 8–16)
APTT PPP: 33 SEC (ref 21–32)
BASOPHILS # BLD AUTO: 0.04 K/UL (ref 0–0.2)
BASOPHILS NFR BLD: 0.4 % (ref 0–1.9)
BUN SERPL-MCNC: 37 MG/DL (ref 6–20)
BUN SERPL-MCNC: 39 MG/DL (ref 6–20)
CALCIUM SERPL-MCNC: 9 MG/DL (ref 8.7–10.5)
CALCIUM SERPL-MCNC: 9.1 MG/DL (ref 8.7–10.5)
CHLORIDE SERPL-SCNC: 87 MMOL/L (ref 95–110)
CHLORIDE SERPL-SCNC: 89 MMOL/L (ref 95–110)
CO2 SERPL-SCNC: 29 MMOL/L (ref 23–29)
CO2 SERPL-SCNC: 30 MMOL/L (ref 23–29)
CREAT SERPL-MCNC: 1.3 MG/DL (ref 0.5–1.4)
CREAT SERPL-MCNC: 1.7 MG/DL (ref 0.5–1.4)
DIFFERENTIAL METHOD BLD: ABNORMAL
EOSINOPHIL # BLD AUTO: 0 K/UL (ref 0–0.5)
EOSINOPHIL NFR BLD: 0.4 % (ref 0–8)
ERYTHROCYTE [DISTWIDTH] IN BLOOD BY AUTOMATED COUNT: 23.6 % (ref 11.5–14.5)
EST. GFR  (NO RACE VARIABLE): 51.9 ML/MIN/1.73 M^2
EST. GFR  (NO RACE VARIABLE): >60 ML/MIN/1.73 M^2
GLUCOSE SERPL-MCNC: 133 MG/DL (ref 70–110)
GLUCOSE SERPL-MCNC: 69 MG/DL (ref 70–110)
HCT VFR BLD AUTO: 30.2 % (ref 40–54)
HGB BLD-MCNC: 8.7 G/DL (ref 14–18)
IMM GRANULOCYTES # BLD AUTO: 0.07 K/UL (ref 0–0.04)
IMM GRANULOCYTES NFR BLD AUTO: 0.6 % (ref 0–0.5)
INR PPP: 2.1 (ref 0.8–1.2)
LDH SERPL L TO P-CCNC: 235 U/L (ref 110–260)
LYMPHOCYTES # BLD AUTO: 1.1 K/UL (ref 1–4.8)
LYMPHOCYTES NFR BLD: 9.9 % (ref 18–48)
MAGNESIUM SERPL-MCNC: 1.9 MG/DL (ref 1.6–2.6)
MCH RBC QN AUTO: 17.8 PG (ref 27–31)
MCHC RBC AUTO-ENTMCNC: 28.8 G/DL (ref 32–36)
MCV RBC AUTO: 62 FL (ref 82–98)
MONOCYTES # BLD AUTO: 0.8 K/UL (ref 0.3–1)
MONOCYTES NFR BLD: 7.4 % (ref 4–15)
NEUTROPHILS # BLD AUTO: 9.3 K/UL (ref 1.8–7.7)
NEUTROPHILS NFR BLD: 81.3 % (ref 38–73)
NRBC BLD-RTO: 0 /100 WBC
PHOSPHATE SERPL-MCNC: 3.4 MG/DL (ref 2.7–4.5)
PLATELET # BLD AUTO: 423 K/UL (ref 150–450)
PMV BLD AUTO: 9.5 FL (ref 9.2–12.9)
POCT GLUCOSE: 103 MG/DL (ref 70–110)
POCT GLUCOSE: 112 MG/DL (ref 70–110)
POCT GLUCOSE: 181 MG/DL (ref 70–110)
POCT GLUCOSE: 301 MG/DL (ref 70–110)
POTASSIUM SERPL-SCNC: 3.2 MMOL/L (ref 3.5–5.1)
POTASSIUM SERPL-SCNC: 3.3 MMOL/L (ref 3.5–5.1)
PROTHROMBIN TIME: 21.7 SEC (ref 9–12.5)
RBC # BLD AUTO: 4.89 M/UL (ref 4.6–6.2)
SODIUM SERPL-SCNC: 130 MMOL/L (ref 136–145)
SODIUM SERPL-SCNC: 130 MMOL/L (ref 136–145)
WBC # BLD AUTO: 11.37 K/UL (ref 3.9–12.7)

## 2025-01-23 PROCEDURE — 85730 THROMBOPLASTIN TIME PARTIAL: CPT | Performed by: STUDENT IN AN ORGANIZED HEALTH CARE EDUCATION/TRAINING PROGRAM

## 2025-01-23 PROCEDURE — 83615 LACTATE (LD) (LDH) ENZYME: CPT | Performed by: STUDENT IN AN ORGANIZED HEALTH CARE EDUCATION/TRAINING PROGRAM

## 2025-01-23 PROCEDURE — 25000003 PHARM REV CODE 250

## 2025-01-23 PROCEDURE — 83735 ASSAY OF MAGNESIUM: CPT | Performed by: NURSE PRACTITIONER

## 2025-01-23 PROCEDURE — 25000003 PHARM REV CODE 250: Performed by: NURSE PRACTITIONER

## 2025-01-23 PROCEDURE — 80048 BASIC METABOLIC PNL TOTAL CA: CPT | Mod: 91

## 2025-01-23 PROCEDURE — 63600175 PHARM REV CODE 636 W HCPCS: Performed by: NURSE PRACTITIONER

## 2025-01-23 PROCEDURE — 36415 COLL VENOUS BLD VENIPUNCTURE: CPT

## 2025-01-23 PROCEDURE — 85610 PROTHROMBIN TIME: CPT | Performed by: NURSE PRACTITIONER

## 2025-01-23 PROCEDURE — 99233 SBSQ HOSP IP/OBS HIGH 50: CPT | Mod: ,,, | Performed by: INTERNAL MEDICINE

## 2025-01-23 PROCEDURE — 20600001 HC STEP DOWN PRIVATE ROOM

## 2025-01-23 PROCEDURE — 27000248 HC VAD-ADDITIONAL DAY

## 2025-01-23 PROCEDURE — 25000003 PHARM REV CODE 250: Performed by: STUDENT IN AN ORGANIZED HEALTH CARE EDUCATION/TRAINING PROGRAM

## 2025-01-23 PROCEDURE — 36415 COLL VENOUS BLD VENIPUNCTURE: CPT | Performed by: STUDENT IN AN ORGANIZED HEALTH CARE EDUCATION/TRAINING PROGRAM

## 2025-01-23 PROCEDURE — 80048 BASIC METABOLIC PNL TOTAL CA: CPT | Performed by: INTERNAL MEDICINE

## 2025-01-23 PROCEDURE — 85025 COMPLETE CBC W/AUTO DIFF WBC: CPT | Performed by: NURSE PRACTITIONER

## 2025-01-23 PROCEDURE — 63600175 PHARM REV CODE 636 W HCPCS: Performed by: INTERNAL MEDICINE

## 2025-01-23 PROCEDURE — 84100 ASSAY OF PHOSPHORUS: CPT | Performed by: STUDENT IN AN ORGANIZED HEALTH CARE EDUCATION/TRAINING PROGRAM

## 2025-01-23 PROCEDURE — 25000003 PHARM REV CODE 250: Performed by: INTERNAL MEDICINE

## 2025-01-23 PROCEDURE — 93750 INTERROGATION VAD IN PERSON: CPT | Mod: ,,, | Performed by: INTERNAL MEDICINE

## 2025-01-23 PROCEDURE — 99232 SBSQ HOSP IP/OBS MODERATE 35: CPT | Mod: ,,, | Performed by: STUDENT IN AN ORGANIZED HEALTH CARE EDUCATION/TRAINING PROGRAM

## 2025-01-23 RX ORDER — POTASSIUM CHLORIDE 750 MG/1
50 CAPSULE, EXTENDED RELEASE ORAL ONCE
Status: COMPLETED | OUTPATIENT
Start: 2025-01-23 | End: 2025-01-23

## 2025-01-23 RX ORDER — POTASSIUM CHLORIDE 20 MEQ/1
40 TABLET, EXTENDED RELEASE ORAL EVERY 4 HOURS
Status: COMPLETED | OUTPATIENT
Start: 2025-01-23 | End: 2025-01-23

## 2025-01-23 RX ADMIN — INSULIN GLARGINE 10 UNITS: 100 INJECTION, SOLUTION SUBCUTANEOUS at 09:01

## 2025-01-23 RX ADMIN — METHOCARBAMOL 500 MG: 500 TABLET ORAL at 09:01

## 2025-01-23 RX ADMIN — HYPROMELLOSE 2910 1 DROP: 5 SOLUTION/ DROPS OPHTHALMIC at 04:01

## 2025-01-23 RX ADMIN — POTASSIUM CHLORIDE 40 MEQ: 1500 TABLET, EXTENDED RELEASE ORAL at 11:01

## 2025-01-23 RX ADMIN — INSULIN ASPART 8 UNITS: 100 INJECTION, SOLUTION INTRAVENOUS; SUBCUTANEOUS at 06:01

## 2025-01-23 RX ADMIN — Medication 800 MG: at 09:01

## 2025-01-23 RX ADMIN — WARFARIN SODIUM 1.5 MG: 1 TABLET ORAL at 04:01

## 2025-01-23 RX ADMIN — POTASSIUM CHLORIDE 50 MEQ: 750 CAPSULE, EXTENDED RELEASE ORAL at 04:01

## 2025-01-23 RX ADMIN — METHOCARBAMOL 500 MG: 500 TABLET ORAL at 04:01

## 2025-01-23 RX ADMIN — AMLODIPINE BESYLATE 10 MG: 10 TABLET ORAL at 09:01

## 2025-01-23 RX ADMIN — EPLERENONE 50 MG: 25 TABLET, FILM COATED ORAL at 10:01

## 2025-01-23 RX ADMIN — LEVETIRACETAM 1500 MG: 500 TABLET, FILM COATED ORAL at 09:01

## 2025-01-23 RX ADMIN — PANTOPRAZOLE SODIUM 40 MG: 40 TABLET, DELAYED RELEASE ORAL at 09:01

## 2025-01-23 RX ADMIN — TORSEMIDE 60 MG: 20 TABLET ORAL at 04:01

## 2025-01-23 RX ADMIN — TORSEMIDE 60 MG: 20 TABLET ORAL at 09:01

## 2025-01-23 RX ADMIN — METHOCARBAMOL 500 MG: 500 TABLET ORAL at 12:01

## 2025-01-23 RX ADMIN — POTASSIUM CHLORIDE 40 MEQ: 1500 TABLET, EXTENDED RELEASE ORAL at 08:01

## 2025-01-23 RX ADMIN — GABAPENTIN 400 MG: 400 CAPSULE ORAL at 09:01

## 2025-01-23 RX ADMIN — ATORVASTATIN CALCIUM 40 MG: 40 TABLET, FILM COATED ORAL at 09:01

## 2025-01-23 RX ADMIN — INSULIN ASPART 8 UNITS: 100 INJECTION, SOLUTION INTRAVENOUS; SUBCUTANEOUS at 09:01

## 2025-01-23 RX ADMIN — EMPAGLIFLOZIN 10 MG: 10 TABLET, FILM COATED ORAL at 10:01

## 2025-01-23 RX ADMIN — HYPROMELLOSE 2910 1 DROP: 5 SOLUTION/ DROPS OPHTHALMIC at 12:01

## 2025-01-23 RX ADMIN — HYPROMELLOSE 2910 1 DROP: 5 SOLUTION/ DROPS OPHTHALMIC at 09:01

## 2025-01-23 RX ADMIN — GABAPENTIN 100 MG: 100 CAPSULE ORAL at 09:01

## 2025-01-23 RX ADMIN — DEXTROSE MONOHYDRATE 6 G: 25000 INJECTION, SOLUTION INTRAVENOUS at 04:01

## 2025-01-23 RX ADMIN — INSULIN ASPART 8 UNITS: 100 INJECTION, SOLUTION INTRAVENOUS; SUBCUTANEOUS at 02:01

## 2025-01-23 RX ADMIN — DULOXETINE HYDROCHLORIDE 30 MG: 30 CAPSULE, DELAYED RELEASE ORAL at 09:01

## 2025-01-23 RX ADMIN — AMIODARONE HYDROCHLORIDE 200 MG: 200 TABLET ORAL at 09:01

## 2025-01-23 RX ADMIN — INSULIN ASPART 4 UNITS: 100 INJECTION, SOLUTION INTRAVENOUS; SUBCUTANEOUS at 02:01

## 2025-01-23 RX ADMIN — ASPIRIN 81 MG: 81 TABLET, COATED ORAL at 09:01

## 2025-01-23 NOTE — CARE UPDATE
-Glucose Goal 140-180    -A1C:   Hemoglobin A1C   Date Value Ref Range Status   12/31/2024 10.6 (H) 4.0 - 5.6 % Final     Comment:     ADA Screening Guidelines:  5.7-6.4%  Consistent with prediabetes  >or=6.5%  Consistent with diabetes    High levels of fetal hemoglobin interfere with the HbA1C  assay. Heterozygous hemoglobin variants (HbS, HgC, etc)do  not significantly interfere with this assay.   However, presence of multiple variants may affect accuracy.           -HOME REGIMEN:  Lantus 20 units daily  - Novolog SSI with meals   - Novolog SSI  150 - 200 + 2 unit    201 - 250 + 4 units    251 - 300 + 6 units    301 - 350 + 8 units       > 350   + 10 units  - Jardiance 10 mg    -GLUCOSE TREND FOR THE PAST 24HRS:   Recent Labs   Lab 01/21/25  2338 01/22/25  0859 01/22/25  1258 01/22/25  1625 01/22/25 2003 01/23/25  0931   POCTGLUCOSE 178* 141* 150* 176* 189* 181*         -NO HYPOGYCEMIAS NOTED     - Diet  Diet Cardiac Low Sodium,2gm, Double Portions; Fluid - 2000mL      Plan:  - Jardiance 10 mg daily (home medication; started per primary team)  -Continue Lantus 10 units once daily  - Continue Novolog to 8 units TID with meals   -Continue Low Dose Correction Scale   - POCT Glucose before meals and at bedtime  - Hypoglycemia protocol in place      Discharge planning: tbd    Endocrine to continue to follow    ** Please call Endocrine for any BG related issues **

## 2025-01-23 NOTE — SUBJECTIVE & OBJECTIVE
Interval History: NAOEN. Blood cultures growing staph. ID following and recommend to continue Ancef. TTE 1/19 with EF 10 -15%. G1 DD. RV systolic function severely reduced. Mod to severe TR. PASP  31 mmHg. IVC/SVC: 15 mmHg. No evidence of vegetation.     Opat/hh orders completed.    extensive discussion with pt regarding compliance with meds/appts at home.    Cr stable - K repleted aggressively. Cont po torsemide 60mg BID, pt responding well, no peripheral edema but JVD still pressent and Cr stable with current regimen.     Paracentesis without signs of SBP on labs, cultures pending.    Scheduled Meds:   amiodarone  200 mg Oral Daily    amLODIPine  10 mg Oral Daily    artificial tears  1 drop Both Eyes QID WAKE    aspirin  81 mg Oral Daily    atorvastatin  40 mg Oral Daily    ceFAZolin (Ancef) 6 g in D5W 500 mL CONTINUOUS INFUSION  6 g Intravenous Q24H    DULoxetine  30 mg Oral Daily    empagliflozin  10 mg Oral Daily    eplerenone  50 mg Oral Daily    gabapentin  100 mg Oral BID    And    gabapentin  400 mg Oral QHS    insulin aspart U-100  8 Units Subcutaneous TIDWM    insulin glargine U-100  10 Units Subcutaneous Daily    levETIRAcetam  1,500 mg Oral BID    magnesium oxide  800 mg Oral BID    methocarbamoL  500 mg Oral QID    pantoprazole  40 mg Oral Daily    polyethylene glycol  17 g Oral Daily    torsemide  60 mg Oral BID loop    warfarin  1.5 mg Oral Daily     PRN Meds:  Current Facility-Administered Medications:     acetaminophen, 650 mg, Oral, Q6H PRN    dextrose 50%, 12.5 g, Intravenous, PRN    dextrose 50%, 25 g, Intravenous, PRN    glucagon (human recombinant), 1 mg, Intramuscular, PRN    glucose, 16 g, Oral, PRN    glucose, 24 g, Oral, PRN    insulin aspart U-100, 0-5 Units, Subcutaneous, QID (AC + HS) PRN    melatonin, 6 mg, Oral, Nightly PRN    ondansetron, 4 mg, Oral, Q8H PRN    senna-docusate 8.6-50 mg, 1 tablet, Oral, Daily PRN    Review of patient's allergies indicates:   Allergen Reactions     Bumex [bumetanide] Hives     Objective:     Vital Signs (Most Recent):  Temp: 97.8 °F (36.6 °C) (01/23/25 1205)  Pulse: 78 (01/23/25 1205)  Resp: 17 (01/23/25 1205)  BP: (!) 76/0 (01/23/25 1205)  SpO2: 97 % (01/23/25 1205) Vital Signs (24h Range):  Temp:  [97.8 °F (36.6 °C)-98.3 °F (36.8 °C)] 97.8 °F (36.6 °C)  Pulse:  [78-88] 78  Resp:  [17-19] 17  SpO2:  [96 %-98 %] 97 %  BP: (74-95)/(0-74) 76/0     Patient Vitals for the past 72 hrs (Last 3 readings):   Weight   01/23/25 0445 60.5 kg (133 lb 6.1 oz)   01/22/25 0415 59.1 kg (130 lb 4.7 oz)   01/21/25 0410 58.9 kg (129 lb 13.6 oz)     Body mass index is 16.67 kg/m².      Intake/Output Summary (Last 24 hours) at 1/23/2025 1554  Last data filed at 1/23/2025 1406  Gross per 24 hour   Intake 2462 ml   Output 4635 ml   Net -2173 ml       Telemetry: NSR 90s       Physical Exam  Vitals and nursing note reviewed.   Constitutional:       Appearance: He is well-developed.   HENT:      Head: Normocephalic.   Eyes:      Pupils: Pupils are equal, round, and reactive to light.   Cardiovascular:      Rate and Rhythm: Normal rate and regular rhythm.      Heart sounds: Murmur heard.      Comments: VAD hum, elevated JVD  Pulmonary:      Effort: Pulmonary effort is normal.      Breath sounds: Normal breath sounds.   Abdominal:      General: Bowel sounds are normal. There is distension.      Palpations: Abdomen is soft.   Musculoskeletal:         General: Normal range of motion.      Cervical back: Normal range of motion and neck supple.      Right lower leg: No edema.      Left lower leg: No edema.   Skin:     General: Skin is warm and dry.   Neurological:      Mental Status: He is alert and oriented to person, place, and time.   Psychiatric:         Behavior: Behavior normal.            Significant Labs:  CBC:  Recent Labs   Lab 01/21/25  0420 01/22/25  0503 01/23/25  0308   WBC 9.61 9.23 11.37   RBC 5.16 5.25 4.89   HGB 9.2* 9.3* 8.7*   HCT 31.9* 32.6* 30.2*    403 423   MCV  62* 62* 62*   MCH 17.8* 17.7* 17.8*   MCHC 28.8* 28.5* 28.8*     BNP:  Recent Labs   Lab 01/17/25  0512 01/20/25  0527 01/22/25  0503   * 526* 463*     CMP:  Recent Labs   Lab 01/17/25  0512 01/17/25  1315 01/18/25  1122 01/18/25  1343 01/20/25  0527 01/20/25  0955 01/22/25  0503 01/22/25  1413 01/23/25  0308   *   < >  --    < > 123*   < > 166* 158* 133*   CALCIUM 9.6   < >  --    < > 9.1   < > 9.5 9.1 9.1   ALBUMIN 2.8*  --  2.5*  --  2.8*  --  3.0*  --   --    PROT 10.4*  --  9.6*  --  9.9*  --  10.1*  --   --    *   < >  --    < > 129*   < > 130* 129* 130*   K 3.0*   < >  --    < > 2.7*   < > 2.9* 4.3 3.3*   CO2 30*   < >  --    < > 30*   < > 31* 30* 30*   CL 88*   < >  --    < > 86*   < > 87* 89* 87*   BUN 23*   < >  --    < > 31*   < > 35* 35* 39*   CREATININE 1.7*   < >  --    < > 1.5*   < > 1.7* 1.5* 1.7*   ALKPHOS 193*  --  173*  --  197*  --  206*  --   --    ALT 17  --   --   --  6*  --  5*  --   --    AST 35  --   --   --  27  --  30  --   --    BILITOT 1.9*  --   --   --  1.4*  --  1.2*  --   --     < > = values in this interval not displayed.      Coagulation:   Recent Labs   Lab 01/21/25  0420 01/22/25  0503 01/23/25  0308   INR 2.1* 2.1* 2.1*   APTT 33.6* 33.0* 33.0*     LDH:  Recent Labs   Lab 01/21/25  0420 01/22/25  0503 01/23/25  0308   * 244 235     Microbiology:  Microbiology Results (last 7 days)       Procedure Component Value Units Date/Time    Blood culture [[id number]] Collected: 01/19/25 0318    Order Status: Completed Specimen: Blood Updated: 01/23/25 0812     Blood Culture, Routine No Growth to date      No Growth to date      No Growth to date      No Growth to date      No Growth to date    Blood culture [[id number]] Collected: 01/19/25 0318    Order Status: Completed Specimen: Blood Updated: 01/23/25 0812     Blood Culture, Routine No Growth to date      No Growth to date      No Growth to date      No Growth to date      No Growth to date    (rule out SBP)  Aerobic culture [[id number]] Collected: 01/18/25 1454    Order Status: Completed Specimen: Ascites Updated: 01/22/25 0959     Aerobic Bacterial Culture No growth    Narrative:      To rule out SBP order labs: Aerobic culture [[id number]],  Culture, Anaerobic [KEN345], Gram stain [ZJK214], Albumin  [ROA424], Protein [CUS302], LDH [FHZ650], WBC \T\ Dff  [HHE3717].    Blood culture [[id number]] Collected: 01/16/25 1006    Order Status: Completed Specimen: Blood Updated: 01/21/25 1212     Blood Culture, Routine No growth after 5 days.    Blood culture [[id number]] Collected: 01/16/25 1006    Order Status: Completed Specimen: Blood Updated: 01/21/25 1212     Blood Culture, Routine No growth after 5 days.    (rule out SBP) Culture, Anaerobic [[id number]] Collected: 01/18/25 1454    Order Status: Completed Specimen: Ascites Updated: 01/20/25 0651     Anaerobic Culture Culture in progress    Narrative:      To rule out SBP order labs: Aerobic culture [HEV992],  Culture, Anaerobic [AAP258], Gram stain [KTL390], Albumin  [NVN276], Protein [LSU802], LDH [UZJ297], WBC \T\ Dff  [YZL5093].    (rule out SBP) Gram stain [[id number]] Collected: 01/18/25 1454    Order Status: Completed Specimen: Ascites Updated: 01/18/25 2124     Gram Stain Result No WBC's      No organisms seen    Narrative:      To rule out SBP order labs: Aerobic culture [[id number]],  Culture, Anaerobic [GJN065], Gram stain [GTB213], Albumin  [SHP022], Protein [ZGD461], LDH [NOE324], WBC \T\ Dff  [AAW6926].    Fungus culture [[id number]] Collected: 01/18/25 1454    Order Status: Sent Specimen: Ascites Updated: 01/18/25 1531    Gram stain [[id number]]     Order Status: Canceled Specimen: Body Fluid     Anaerobic culture [[id number]]     Order Status: Canceled Specimen: Body Fluid     Aerobic culture [[id number]]     Order Status: Canceled Specimen: Peritoneal Fluid             I have reviewed all pertinent labs within the past 24 hours.    Estimated Creatinine Clearance:  49.9 mL/min (A) (based on SCr of 1.7 mg/dL (H)).    Diagnostic Results:  I have reviewed all pertinent imaging results/findings within the past 24 hours.

## 2025-01-23 NOTE — PLAN OF CARE
Chart reviewed due to inclement weather:    No fevers documented overnight. Blood cx no growth. TTE negative. Plan for discharge home to pt's mother's house who has been helping him.       Recommendations:  -cefazolin x 6w from cleared blood cx, maria isabel 2/26 with transition back to pt's suppressive therapy with cefadroxil once pt completes iv abx       Outpatient Antibiotic Therapy Plan:    Please send referral to [facility].    1) Infection: mssa bacteremia    2) Discharge Antibiotics:    Intravenous antibiotics:  Cefazolin 6g continuous infusion iv daily or 2g q8hr     3) Therapy Duration:  6w    Estimated end date of IV antibiotics: 2/26/25    4) Outpatient Weekly Labs:    Order the following labs to be drawn on Mondays:   CBC  CMP       5) Fax Lab Results to Infectious Diseases Provider: rosa    [facility] Fax Number: [phone]    6) Outpatient Infectious Diseases Follow-up    Follow-up appointment will be arranged by the ID clinic and will be found in the patient's appointments tab.    Prior to discharge, please ensure the patient's follow-up has been scheduled.    If there is still no follow-up scheduled prior to discharge, please send an [place] message to [facility] Clinical Pool or Call Infectious Diseases Dept.      Above d/w primary team. Will sign off, please call with questions, new culture data or clinical changes.           35 minutes of total time spent on the encounter, which includes face to face time and non-face to face time preparing to see the patient (eg, review of tests), obtaining and/or reviewing separately obtained history, documenting clinical information in the electronic or other health record, independently interpreting results (not separately reported) and communicating results to the patient/family/caregiver, or care coordination (not separately reported).

## 2025-01-23 NOTE — PROGRESS NOTES
01/23/2025  Ruma Li    Current provider:  Marlo Marques MD    Device interrogation:      1/23/2025     9:00 AM 1/22/2025    11:52 PM 1/22/2025     8:17 PM 1/22/2025     3:48 PM 1/22/2025    11:19 AM 1/22/2025     9:00 AM 1/22/2025     4:22 AM   TXP LVAD INTERROGATIONS   Type HeartMate3 HeartMate3 HeartMate3 HeartMate3 HeartMate3 HeartMate3 HeartMate3   Flow 4.1 4.2 4.1 3.9 3.9 4.2 4.5   Speed 5100 5100 5150 5100 5100 5100 5150   PI 5.6 5.3 5.2 5.9 5.6 4.8 3.9   Power (Serna) 3.7 3.6 3.7 3.6 3.6 3.6 3.6   LSL 4700 4700 4700    4700   Pulsatility Intermittent pulse Intermittent pulse Intermittent pulse Intermittent pulse Intermittent pulse Intermittent pulse Intermittent pulse          Rounded on Kevan Queen to ensure all mechanical assist device settings (IABP or VAD) were appropriate and all parameters were within limits.  I was able to ensure all back up equipment was present, the staff had no issues, and the Perfusion Department daily rounding was complete.      For implantable VADs: Interrogation of Ventricular assist device was performed with analysis of device parameters and review of device function. I have personally reviewed the interrogation findings and agree with findings as stated.     In emergency, the nursing units have been notified to contact the perfusion department either by:  Calling [phone] from 630am to 4pm Mon thru Fri, utilizing the On-Call Finder functionality of Epic and searching for Perfusion, or by contacting the hospital  from 4pm to 630am and on weekends and asking to speak with the perfusionist on call.    11:00 AM

## 2025-01-23 NOTE — ASSESSMENT & PLAN NOTE
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 Therapeutic today.  Previous home regimen 1.5mg Qdaily  -Antiplatelets ASA 81 mg, on hold 2/2 GIB history  -LDH is stable overall today. Will continue to monitor daily.  -Speed set at 5100  -Interrogation notable for PI events with 1 LFA on 1/20/25  -Not listed for OHTx    Procedure: Device Interrogation Including analysis of device parameters  Current Settings: Ventricular Assist Device        1/22/2025     3:48 PM 1/22/2025    11:19 AM 1/22/2025     9:00 AM 1/22/2025     4:22 AM 1/22/2025    12:00 AM 1/21/2025     8:16 PM 1/21/2025     3:35 PM   TXP LVAD INTERROGATIONS   Type HeartMate3 HeartMate3 HeartMate3 HeartMate3 HeartMate3 HeartMate3 HeartMate3   Flow 3.9 3.9 4.2 4.5 40 4 4.1   Speed 5100 5100 5100 5150 5100 5100 5100   PI 5.9 5.6 4.8 3.9 5.2 5.7 5.1   Power (Serna) 3.6 3.6 3.6 3.6 3.6 3.6 3.6   LSL    4700 4700 4700    Pulsatility Intermittent pulse Intermittent pulse Intermittent pulse Intermittent pulse Intermittent pulse Intermittent pulse Intermittent pulse

## 2025-01-23 NOTE — SUBJECTIVE & OBJECTIVE
Interval History: NAOEN. Blood cultures growing staph. ID following and recommend to continue Ancef. TTE 1/19 with EF 10 -15%. G1 DD. RV systolic function severely reduced. Mod to severe TR. PASP  31 mmHg. IVC/SVC: 15 mmHg. No evidence of vegetation.     extensive discussion with pt regarding compliance with meds/appts at home.    Cr stable - K repleted aggressively. Cont po torsemide 60mg BID, pt responding well, no peripheral edema but JVD still pressent and Cr stable with current regimen.     Paracentesis without signs of SBP on labs, cultures pending.    Scheduled Meds:   amiodarone  200 mg Oral Daily    amLODIPine  10 mg Oral Daily    artificial tears  1 drop Both Eyes QID WAKE    aspirin  81 mg Oral Daily    atorvastatin  40 mg Oral Daily    ceFAZolin (Ancef) 6 g in D5W 500 mL CONTINUOUS INFUSION  6 g Intravenous Q24H    DULoxetine  30 mg Oral Daily    empagliflozin  10 mg Oral Daily    eplerenone  50 mg Oral Daily    gabapentin  100 mg Oral BID    And    gabapentin  400 mg Oral QHS    insulin aspart U-100  8 Units Subcutaneous TIDWM    insulin glargine U-100  10 Units Subcutaneous Daily    levETIRAcetam  1,500 mg Oral BID    magnesium oxide  800 mg Oral BID    methocarbamoL  500 mg Oral QID    pantoprazole  40 mg Oral Daily    polyethylene glycol  17 g Oral Daily    torsemide  60 mg Oral BID loop    warfarin  1.5 mg Oral Daily     PRN Meds:  Current Facility-Administered Medications:     acetaminophen, 650 mg, Oral, Q6H PRN    dextrose 50%, 12.5 g, Intravenous, PRN    dextrose 50%, 25 g, Intravenous, PRN    glucagon (human recombinant), 1 mg, Intramuscular, PRN    glucose, 16 g, Oral, PRN    glucose, 24 g, Oral, PRN    insulin aspart U-100, 0-5 Units, Subcutaneous, QID (AC + HS) PRN    melatonin, 6 mg, Oral, Nightly PRN    ondansetron, 4 mg, Oral, Q8H PRN    senna-docusate 8.6-50 mg, 1 tablet, Oral, Daily PRN    Review of patient's allergies indicates:   Allergen Reactions    Bumex [bumetanide] Hives      Objective:     Vital Signs (Most Recent):  Temp: 98 °F (36.7 °C) (01/22/25 1548)  Pulse: 81 (01/22/25 1548)  Resp: 17 (01/22/25 1548)  BP: (!) 66/0 (01/22/25 1548)  SpO2: 97 % (01/22/25 1548) Vital Signs (24h Range):  Temp:  [97.6 °F (36.4 °C)-98 °F (36.7 °C)] 98 °F (36.7 °C)  Pulse:  [58-84] 81  Resp:  [17-18] 17  SpO2:  [97 %-99 %] 97 %  BP: ()/(0-71) 66/0     Patient Vitals for the past 72 hrs (Last 3 readings):   Weight   01/22/25 0415 59.1 kg (130 lb 4.7 oz)   01/21/25 0410 58.9 kg (129 lb 13.6 oz)   01/20/25 0745 58.7 kg (129 lb 6.6 oz)     Body mass index is 16.29 kg/m².      Intake/Output Summary (Last 24 hours) at 1/22/2025 1956  Last data filed at 1/22/2025 1633  Gross per 24 hour   Intake 2640 ml   Output 2990 ml   Net -350 ml       Telemetry: NSR 90s       Physical Exam  Vitals and nursing note reviewed.   Constitutional:       Appearance: He is well-developed.   HENT:      Head: Normocephalic.   Eyes:      Pupils: Pupils are equal, round, and reactive to light.   Cardiovascular:      Rate and Rhythm: Normal rate and regular rhythm.      Heart sounds: Murmur heard.      Comments: VAD hum, elevated JVD  Pulmonary:      Effort: Pulmonary effort is normal.      Breath sounds: Normal breath sounds.   Abdominal:      General: Bowel sounds are normal. There is distension.      Palpations: Abdomen is soft.   Musculoskeletal:         General: Normal range of motion.      Cervical back: Normal range of motion and neck supple.      Right lower leg: No edema.      Left lower leg: No edema.   Skin:     General: Skin is warm and dry.   Neurological:      Mental Status: He is alert and oriented to person, place, and time.   Psychiatric:         Behavior: Behavior normal.            Significant Labs:  CBC:  Recent Labs   Lab 01/20/25  0527 01/21/25  0420 01/22/25  0503   WBC 8.64 9.61 9.23   RBC 5.45 5.16 5.25   HGB 9.9* 9.2* 9.3*   HCT 33.4* 31.9* 32.6*    344 403   MCV 61* 62* 62*   MCH 18.2* 17.8*  17.7*   MCHC 29.6* 28.8* 28.5*     BNP:  Recent Labs   Lab 01/17/25 0512 01/20/25  0527 01/22/25  0503   * 526* 463*     CMP:  Recent Labs   Lab 01/17/25 0512 01/17/25  1315 01/18/25  1122 01/18/25  1343 01/20/25  0527 01/20/25  0955 01/21/25  0420 01/22/25  0503 01/22/25  1413   *   < >  --    < > 123*   < > 113* 166* 158*   CALCIUM 9.6   < >  --    < > 9.1   < > 9.4 9.5 9.1   ALBUMIN 2.8*  --  2.5*  --  2.8*  --   --  3.0*  --    PROT 10.4*  --  9.6*  --  9.9*  --   --  10.1*  --    *   < >  --    < > 129*   < > 129* 130* 129*   K 3.0*   < >  --    < > 2.7*   < > 3.1* 2.9* 4.3   CO2 30*   < >  --    < > 30*   < > 31* 31* 30*   CL 88*   < >  --    < > 86*   < > 86* 87* 89*   BUN 23*   < >  --    < > 31*   < > 35* 35* 35*   CREATININE 1.7*   < >  --    < > 1.5*   < > 1.5* 1.7* 1.5*   ALKPHOS 193*  --  173*  --  197*  --   --  206*  --    ALT 17  --   --   --  6*  --   --  5*  --    AST 35  --   --   --  27  --   --  30  --    BILITOT 1.9*  --   --   --  1.4*  --   --  1.2*  --     < > = values in this interval not displayed.      Coagulation:   Recent Labs   Lab 01/20/25 0527 01/21/25 0420 01/22/25  0503   INR 2.2* 2.1* 2.1*   APTT 33.3* 33.6* 33.0*     LDH:  Recent Labs   Lab 01/20/25 0527 01/21/25  0420 01/22/25  0503    272* 244     Microbiology:  Microbiology Results (last 7 days)       Procedure Component Value Units Date/Time    (rule out SBP) Aerobic culture [[id number]] Collected: 01/18/25 1454    Order Status: Completed Specimen: Ascites Updated: 01/22/25 0959     Aerobic Bacterial Culture No growth    Narrative:      To rule out SBP order labs: Aerobic culture [[id number]],  Culture, Anaerobic [[id number]], Gram stain [AVT280], Albumin  [VHR047], Protein [HGY158], LDH [URG217], WBC \T\ Dff  [[id number]].    Blood culture [[id number]] Collected: 01/19/25 0318    Order Status: Completed Specimen: Blood Updated: 01/22/25 0812     Blood Culture, Routine No Growth to date      No Growth to  date      No Growth to date      No Growth to date    Blood culture [[id number]] Collected: 01/19/25 0318    Order Status: Completed Specimen: Blood Updated: 01/22/25 0812     Blood Culture, Routine No Growth to date      No Growth to date      No Growth to date      No Growth to date    Blood culture [[id number]] Collected: 01/16/25 1006    Order Status: Completed Specimen: Blood Updated: 01/21/25 1212     Blood Culture, Routine No growth after 5 days.    Blood culture [[id number]] Collected: 01/16/25 1006    Order Status: Completed Specimen: Blood Updated: 01/21/25 1212     Blood Culture, Routine No growth after 5 days.    (rule out SBP) Culture, Anaerobic [[id number]] Collected: 01/18/25 1454    Order Status: Completed Specimen: Ascites Updated: 01/20/25 0651     Anaerobic Culture Culture in progress    Narrative:      To rule out SBP order labs: Aerobic culture [[id number]],  Culture, Anaerobic [[id number]], Gram stain [SJV434], Albumin  [DAB674], Protein [GOW051], LDH [MXR675], WBC \T\ Dff  [JBF2345].    (rule out SBP) Gram stain [[id number]] Collected: 01/18/25 1454    Order Status: Completed Specimen: Ascites Updated: 01/18/25 2124     Gram Stain Result No WBC's      No organisms seen    Narrative:      To rule out SBP order labs: Aerobic culture [[id number]],  Culture, Anaerobic [[id number]], Gram stain [JER337], Albumin  [IXR589], Protein [SAL840], LDH [GEZ660], WBC \T\ Dff  [GZV8661].    Fungus culture [[id number]] Collected: 01/18/25 1454    Order Status: Sent Specimen: Ascites Updated: 01/18/25 1531    Gram stain [[id number]]     Order Status: Canceled Specimen: Body Fluid     Anaerobic culture [[id number]]     Order Status: Canceled Specimen: Body Fluid     Aerobic culture [[id number]]     Order Status: Canceled Specimen: Peritoneal Fluid             I have reviewed all pertinent labs within the past 24 hours.    Estimated Creatinine Clearance: 55.3 mL/min (A) (based on SCr of 1.5 mg/dL (H)).    Diagnostic Results:  I  have reviewed all pertinent imaging results/findings within the past 24 hours.

## 2025-01-23 NOTE — PROGRESS NOTES
Discharge Instructions  You may not be sure when your baby is sick and needs to see a doctor, especially if this is your first baby.  DO call your clinic if you are worried about your baby s health.  Most clinics have a 24-hour nurse help line. They are able to answer your questions or reach your doctor 24 hours a day. It is best to call your doctor or clinic instead of the hospital. We are here to help you.    Call 911 if your baby:  - Is limp and floppy  - Has  stiff arms or legs or repeated jerking movements  - Arches his or her back repeatedly  - Has a high-pitched cry  - Has bluish skin  or looks very pale    Call your baby s doctor or go to the emergency room right away if your baby:  - Has a high fever: Rectal temperature of 100.4 degrees F (38 degrees C) or higher or underarm temperature of 99 degree F (37.2 C) or higher.  - Has skin that looks yellow, and the baby seems very sleepy.  - Has an infection (redness, swelling, pain) around the umbilical cord or circumcised penis OR bleeding that does not stop after a few minutes.    Call your baby s clinic if you notice:  - A low rectal temperature of (97.5 degrees F or 36.4 degree C).  - Changes in behavior.  For example, a normally quiet baby is very fussy and irritable all day, or an active baby is very sleepy and limp.  - Vomiting. This is not spitting up after feedings, which is normal, but actually throwing up the contents of the stomach.  - Diarrhea (watery stools) or constipation (hard, dry stools that are difficult to pass).  stools are usually quite soft but should not be watery.  - Blood or mucus in the stools.  - Coughing or breathing changes (fast breathing, forceful breathing, or noisy breathing after you clear mucus from the nose).  - Feeding problems with a lot of spitting up.  - Your baby does not want to feed for more than 6 to 8 hours or has fewer diapers than expected in a 24 hour period.  Refer to the feeding log for expected  [facility] - [facility]  Heart Transplant  Progress Note    Patient Name: Kevan Queen  MRN: [id number]  Admission Date: 1/15/2025  Hospital Length of Stay: 7 days  Attending Physician: Marlo Marques MD  Primary Care Provider: Rosio Armendariz FNP  Principal Problem:Acute on chronic systolic CHF (congestive heart failure)    Subjective:   Interval History: NAOEN. Blood cultures growing staph. ID following and recommend to continue Ancef. TTE 1/19 with EF 10 -15%. G1 DD. RV systolic function severely reduced. Mod to severe TR. PASP  31 mmHg. IVC/SVC: 15 mmHg. No evidence of vegetation.     extensive discussion with pt regarding compliance with meds/appts at home.    Cr stable - K repleted aggressively. Cont po torsemide 60mg BID, pt responding well, no peripheral edema but JVD still pressent and Cr stable with current regimen.     Paracentesis without signs of SBP on labs, cultures pending.    Scheduled Meds:   amiodarone  200 mg Oral Daily    amLODIPine  10 mg Oral Daily    artificial tears  1 drop Both Eyes QID WAKE    aspirin  81 mg Oral Daily    atorvastatin  40 mg Oral Daily    ceFAZolin (Ancef) 6 g in D5W 500 mL CONTINUOUS INFUSION  6 g Intravenous Q24H    DULoxetine  30 mg Oral Daily    empagliflozin  10 mg Oral Daily    eplerenone  50 mg Oral Daily    gabapentin  100 mg Oral BID    And    gabapentin  400 mg Oral QHS    insulin aspart U-100  8 Units Subcutaneous TIDWM    insulin glargine U-100  10 Units Subcutaneous Daily    levETIRAcetam  1,500 mg Oral BID    magnesium oxide  800 mg Oral BID    methocarbamoL  500 mg Oral QID    pantoprazole  40 mg Oral Daily    polyethylene glycol  17 g Oral Daily    torsemide  60 mg Oral BID loop    warfarin  1.5 mg Oral Daily     PRN Meds:  Current Facility-Administered Medications:     acetaminophen, 650 mg, Oral, Q6H PRN    dextrose 50%, 12.5 g, Intravenous, PRN    dextrose 50%, 25 g, Intravenous, PRN    glucagon (human recombinant), 1 mg,  Intramuscular, PRN    glucose, 16 g, Oral, PRN    glucose, 24 g, Oral, PRN    insulin aspart U-100, 0-5 Units, Subcutaneous, QID (AC + HS) PRN    melatonin, 6 mg, Oral, Nightly PRN    ondansetron, 4 mg, Oral, Q8H PRN    senna-docusate 8.6-50 mg, 1 tablet, Oral, Daily PRN    Review of patient's allergies indicates:   Allergen Reactions    Bumex [bumetanide] Hives     Objective:     Vital Signs (Most Recent):  Temp: 98 °F (36.7 °C) (01/22/25 1548)  Pulse: 81 (01/22/25 1548)  Resp: 17 (01/22/25 1548)  BP: (!) 66/0 (01/22/25 1548)  SpO2: 97 % (01/22/25 1548) Vital Signs (24h Range):  Temp:  [97.6 °F (36.4 °C)-98 °F (36.7 °C)] 98 °F (36.7 °C)  Pulse:  [58-84] 81  Resp:  [17-18] 17  SpO2:  [97 %-99 %] 97 %  BP: ()/(0-71) 66/0     Patient Vitals for the past 72 hrs (Last 3 readings):   Weight   01/22/25 0415 59.1 kg (130 lb 4.7 oz)   01/21/25 0410 58.9 kg (129 lb 13.6 oz)   01/20/25 0745 58.7 kg (129 lb 6.6 oz)     Body mass index is 16.29 kg/m².      Intake/Output Summary (Last 24 hours) at 1/22/2025 1956  Last data filed at 1/22/2025 1633  Gross per 24 hour   Intake 2640 ml   Output 2990 ml   Net -350 ml       Telemetry: NSR 90s       Physical Exam  Vitals and nursing note reviewed.   Constitutional:       Appearance: He is well-developed.   HENT:      Head: Normocephalic.   Eyes:      Pupils: Pupils are equal, round, and reactive to light.   Cardiovascular:      Rate and Rhythm: Normal rate and regular rhythm.      Heart sounds: Murmur heard.      Comments: VAD hum, elevated JVD  Pulmonary:      Effort: Pulmonary effort is normal.      Breath sounds: Normal breath sounds.   Abdominal:      General: Bowel sounds are normal. There is distension.      Palpations: Abdomen is soft.   Musculoskeletal:         General: Normal range of motion.      Cervical back: Normal range of motion and neck supple.      Right lower leg: No edema.      Left lower leg: No edema.   Skin:     General: Skin is warm and dry.   Neurological:  number of wet diapers in the first days of life.    If you have any concerns about hurting yourself of the baby, call your doctor right away.      Baby's Birth Weight: 6 lb 12.3 oz (3070 g)  Baby's Discharge Weight: 2.727 kg (6 lb 0.2 oz)    Recent Labs   Lab Test 19  1107   TCBIL 12.3*       Immunization History   Administered Date(s) Administered     Hep B, Peds or Adolescent 2019       Hearing Screen Date: 19   Hearing Screen, Left Ear: passed  Hearing Screen, Right Ear: passed     Umbilical Cord: drying    Pulse Oximetry Screen Result: pass  (right arm): 97 %  (foot): 99 %    Car Seat Testing Results:      Date and Time of [place] Metabolic Screen: 19 1231     ID Band Number ________  I have checked to make sure that this is my baby.        Mental Status: He is alert and oriented to person, place, and time.   Psychiatric:         Behavior: Behavior normal.            Significant Labs:  CBC:  Recent Labs   Lab 01/20/25  0527 01/21/25  0420 01/22/25  0503   WBC 8.64 9.61 9.23   RBC 5.45 5.16 5.25   HGB 9.9* 9.2* 9.3*   HCT 33.4* 31.9* 32.6*    344 403   MCV 61* 62* 62*   MCH 18.2* 17.8* 17.7*   MCHC 29.6* 28.8* 28.5*     BNP:  Recent Labs   Lab 01/17/25  0512 01/20/25  0527 01/22/25  0503   * 526* 463*     CMP:  Recent Labs   Lab 01/17/25  0512 01/17/25  1315 01/18/25  1122 01/18/25  1343 01/20/25  0527 01/20/25  0955 01/21/25  0420 01/22/25  0503 01/22/25  1413   *   < >  --    < > 123*   < > 113* 166* 158*   CALCIUM 9.6   < >  --    < > 9.1   < > 9.4 9.5 9.1   ALBUMIN 2.8*  --  2.5*  --  2.8*  --   --  3.0*  --    PROT 10.4*  --  9.6*  --  9.9*  --   --  10.1*  --    *   < >  --    < > 129*   < > 129* 130* 129*   K 3.0*   < >  --    < > 2.7*   < > 3.1* 2.9* 4.3   CO2 30*   < >  --    < > 30*   < > 31* 31* 30*   CL 88*   < >  --    < > 86*   < > 86* 87* 89*   BUN 23*   < >  --    < > 31*   < > 35* 35* 35*   CREATININE 1.7*   < >  --    < > 1.5*   < > 1.5* 1.7* 1.5*   ALKPHOS 193*  --  173*  --  197*  --   --  206*  --    ALT 17  --   --   --  6*  --   --  5*  --    AST 35  --   --   --  27  --   --  30  --    BILITOT 1.9*  --   --   --  1.4*  --   --  1.2*  --     < > = values in this interval not displayed.      Coagulation:   Recent Labs   Lab 01/20/25 0527 01/21/25 0420 01/22/25  0503   INR 2.2* 2.1* 2.1*   APTT 33.3* 33.6* 33.0*     LDH:  Recent Labs   Lab 01/20/25 0527 01/21/25 0420 01/22/25  0503    272* 244     Microbiology:  Microbiology Results (last 7 days)       Procedure Component Value Units Date/Time    (rule out SBP) Aerobic culture [[id number]] Collected: 01/18/25 1454    Order Status: Completed Specimen: Ascites Updated: 01/22/25 0959     Aerobic Bacterial Culture No growth    Narrative:       To rule out SBP order labs: Aerobic culture [[id number]],  Culture, Anaerobic [ESG416], Gram stain [DDB870], Albumin  [HYF265], Protein [NZC675], LDH [TMW978], WBC \T\ Dff  [RJZ9172].    Blood culture [[id number]] Collected: 01/19/25 0318    Order Status: Completed Specimen: Blood Updated: 01/22/25 0812     Blood Culture, Routine No Growth to date      No Growth to date      No Growth to date      No Growth to date    Blood culture [[id number]] Collected: 01/19/25 0318    Order Status: Completed Specimen: Blood Updated: 01/22/25 0812     Blood Culture, Routine No Growth to date      No Growth to date      No Growth to date      No Growth to date    Blood culture [[id number]] Collected: 01/16/25 1006    Order Status: Completed Specimen: Blood Updated: 01/21/25 1212     Blood Culture, Routine No growth after 5 days.    Blood culture [[id number]] Collected: 01/16/25 1006    Order Status: Completed Specimen: Blood Updated: 01/21/25 1212     Blood Culture, Routine No growth after 5 days.    (rule out SBP) Culture, Anaerobic [[id number]] Collected: 01/18/25 1454    Order Status: Completed Specimen: Ascites Updated: 01/20/25 0651     Anaerobic Culture Culture in progress    Narrative:      To rule out SBP order labs: Aerobic culture [[id number]],  Culture, Anaerobic [KVC790], Gram stain [HHA580], Albumin  [YCU970], Protein [JWJ793], LDH [VPG493], WBC \T\ Dff  [ZWY8679].    (rule out SBP) Gram stain [[id number]] Collected: 01/18/25 1454    Order Status: Completed Specimen: Ascites Updated: 01/18/25 2124     Gram Stain Result No WBC's      No organisms seen    Narrative:      To rule out SBP order labs: Aerobic culture [GEH132],  Culture, Anaerobic [YOF264], Gram stain [SMY733], Albumin  [FQG179], Protein [TYF921], LDH [HRH549], WBC \T\ Dff  [[id number]].    Fungus culture [[id number]] Collected: 01/18/25 1454    Order Status: Sent Specimen: Ascites Updated: 01/18/25 1531    Gram stain [[id number]]     Order Status: Canceled Specimen:  Body Fluid     Anaerobic culture [[id number]]     Order Status: Canceled Specimen: Body Fluid     Aerobic culture [[id number]]     Order Status: Canceled Specimen: Peritoneal Fluid             I have reviewed all pertinent labs within the past 24 hours.    Estimated Creatinine Clearance: 55.3 mL/min (A) (based on SCr of 1.5 mg/dL (H)).    Diagnostic Results:  I have reviewed all pertinent imaging results/findings within the past 24 hours.  Assessment and Plan:     No notes on file    * Acute on chronic systolic CHF (congestive heart failure)  -NICM  -Last 2D Echo 1/19/25 with EF 10 -15%. G1 DD. RV systolic function severely reduced. Mod to severe TR. PASP  31 mmHg. IVC/SVC: 15 mmHg.  - Previous Echo 8/26/24 : LVEF 5-10%, LVEDD  7.2 cm  -S/p diuresis with Lasix gtt.  cont Torsemide 60mg BID - I/O: negative 2L for yesterday.   -GDMT with Eplerenone  -2g Na dietary restriction, 1500 mL fluid restriction, strict I/Os      LVAD (left ventricular assist device) present  HeartMate 3 Implanted 6/23/2022 with early RV failure requiring RVAD with ProTek Duo   -Continue Coumadin,  Goal INR 2.0-3.0 . Therapeutic today.  Previous home regimen 1.5mg Qdaily  -Antiplatelets ASA 81 mg, on hold 2/2 GIB history  -LDH is stable overall today. Will continue to monitor daily.  -Speed set at 5100  -Interrogation notable for PI events with 1 LFA on 1/20/25  -Not listed for OHTx    Procedure: Device Interrogation Including analysis of device parameters  Current Settings: Ventricular Assist Device        1/22/2025     3:48 PM 1/22/2025    11:19 AM 1/22/2025     9:00 AM 1/22/2025     4:22 AM 1/22/2025    12:00 AM 1/21/2025     8:16 PM 1/21/2025     3:35 PM   TXP LVAD INTERROGATIONS   Type HeartMate3 HeartMate3 HeartMate3 HeartMate3 HeartMate3 HeartMate3 HeartMate3   Flow 3.9 3.9 4.2 4.5 40 4 4.1   Speed 5100 5100 5100 5150 5100 5100 5100   PI 5.9 5.6 4.8 3.9 5.2 5.7 5.1   Power (Serna) 3.6 3.6 3.6 3.6 3.6 3.6 3.6   LSL    4700 4700 4700     Pulsatility Intermittent pulse Intermittent pulse Intermittent pulse Intermittent pulse Intermittent pulse Intermittent pulse Intermittent pulse         Ascites  -Will monitor with diuresis   - Labs negative for SBP - follow cultures - NGTD    Hypokalemia  Replace and monitor per ptotocol    HTN (hypertension)  -On home dose of Amlodipine and Eplerenone    Chronic anticoagulation  -See above VAD    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   -Repeat Blood Cx 1/16 negative thus far  -Started on Vanc/Zosyn.  -ID consult.   -Vanc and Zosyn changed to Ancef  -TTE and ophthalmology consult ordered per ID recommendations. No vegetation on TTE and no infectious etiology of blurred vision per ophtho   -diagnostic paracentesis -negative so far, but cultures pending.     Right heart failure secondary to left heart failure-post LVAD surgery  -Weaned off inotropes 8/2024  -See above heart failure     Temporal lobe seizure  -On home dose of Keppra         Alice Ayala MD  Heart Transplant  [facility]

## 2025-01-23 NOTE — ASSESSMENT & PLAN NOTE
-Hx of chronic DLES on Cefadroxil for suppression  -cont current reg of cefazolin IV, need abx x 6wk

## 2025-01-23 NOTE — PROGRESS NOTES
[facility] - Cardiac Intensive Care  Heart Transplant  Progress Note    Patient Name: Kevna Queen  MRN: [id number]  Admission Date: 1/15/2025  Hospital Length of Stay: 8 days  Attending Physician: Marlo Marques MD  Primary Care Provider: Rosio Armendariz FNP  Principal Problem:Acute on chronic systolic CHF (congestive heart failure)    Subjective:   Interval History: NAOEN. Blood cultures growing staph. ID following and recommend to continue Ancef. TTE 1/19 with EF 10 -15%. G1 DD. RV systolic function severely reduced. Mod to severe TR. PASP  31 mmHg. IVC/SVC: 15 mmHg. No evidence of vegetation.     Opat/hh orders completed.    extensive discussion with pt regarding compliance with meds/appts at home.    Cr stable - K repleted aggressively. Cont po torsemide 60mg BID, pt responding well, no peripheral edema but JVD still pressent and Cr stable with current regimen.     Paracentesis without signs of SBP on labs, cultures pending.    Scheduled Meds:   amiodarone  200 mg Oral Daily    amLODIPine  10 mg Oral Daily    artificial tears  1 drop Both Eyes QID WAKE    aspirin  81 mg Oral Daily    atorvastatin  40 mg Oral Daily    ceFAZolin (Ancef) 6 g in D5W 500 mL CONTINUOUS INFUSION  6 g Intravenous Q24H    DULoxetine  30 mg Oral Daily    empagliflozin  10 mg Oral Daily    eplerenone  50 mg Oral Daily    gabapentin  100 mg Oral BID    And    gabapentin  400 mg Oral QHS    insulin aspart U-100  8 Units Subcutaneous TIDWM    insulin glargine U-100  10 Units Subcutaneous Daily    levETIRAcetam  1,500 mg Oral BID    magnesium oxide  800 mg Oral BID    methocarbamoL  500 mg Oral QID    pantoprazole  40 mg Oral Daily    polyethylene glycol  17 g Oral Daily    torsemide  60 mg Oral BID loop    warfarin  1.5 mg Oral Daily     PRN Meds:  Current Facility-Administered Medications:     acetaminophen, 650 mg, Oral, Q6H PRN    dextrose 50%, 12.5 g, Intravenous, PRN    dextrose 50%, 25 g, Intravenous, PRN    glucagon (human  recombinant), 1 mg, Intramuscular, PRN    glucose, 16 g, Oral, PRN    glucose, 24 g, Oral, PRN    insulin aspart U-100, 0-5 Units, Subcutaneous, QID (AC + HS) PRN    melatonin, 6 mg, Oral, Nightly PRN    ondansetron, 4 mg, Oral, Q8H PRN    senna-docusate 8.6-50 mg, 1 tablet, Oral, Daily PRN    Review of patient's allergies indicates:   Allergen Reactions    Bumex [bumetanide] Hives     Objective:     Vital Signs (Most Recent):  Temp: 97.8 °F (36.6 °C) (01/23/25 1205)  Pulse: 78 (01/23/25 1205)  Resp: 17 (01/23/25 1205)  BP: (!) 76/0 (01/23/25 1205)  SpO2: 97 % (01/23/25 1205) Vital Signs (24h Range):  Temp:  [97.8 °F (36.6 °C)-98.3 °F (36.8 °C)] 97.8 °F (36.6 °C)  Pulse:  [78-88] 78  Resp:  [17-19] 17  SpO2:  [96 %-98 %] 97 %  BP: (74-95)/(0-74) 76/0     Patient Vitals for the past 72 hrs (Last 3 readings):   Weight   01/23/25 0445 60.5 kg (133 lb 6.1 oz)   01/22/25 0415 59.1 kg (130 lb 4.7 oz)   01/21/25 0410 58.9 kg (129 lb 13.6 oz)     Body mass index is 16.67 kg/m².      Intake/Output Summary (Last 24 hours) at 1/23/2025 1554  Last data filed at 1/23/2025 1406  Gross per 24 hour   Intake 2462 ml   Output 4635 ml   Net -2173 ml       Telemetry: NSR 90s       Physical Exam  Vitals and nursing note reviewed.   Constitutional:       Appearance: He is well-developed.   HENT:      Head: Normocephalic.   Eyes:      Pupils: Pupils are equal, round, and reactive to light.   Cardiovascular:      Rate and Rhythm: Normal rate and regular rhythm.      Heart sounds: Murmur heard.      Comments: VAD hum, elevated JVD  Pulmonary:      Effort: Pulmonary effort is normal.      Breath sounds: Normal breath sounds.   Abdominal:      General: Bowel sounds are normal. There is distension.      Palpations: Abdomen is soft.   Musculoskeletal:         General: Normal range of motion.      Cervical back: Normal range of motion and neck supple.      Right lower leg: No edema.      Left lower leg: No edema.   Skin:     General: Skin is  warm and dry.   Neurological:      Mental Status: He is alert and oriented to person, place, and time.   Psychiatric:         Behavior: Behavior normal.            Significant Labs:  CBC:  Recent Labs   Lab 01/21/25  0420 01/22/25  0503 01/23/25  0308   WBC 9.61 9.23 11.37   RBC 5.16 5.25 4.89   HGB 9.2* 9.3* 8.7*   HCT 31.9* 32.6* 30.2*    403 423   MCV 62* 62* 62*   MCH 17.8* 17.7* 17.8*   MCHC 28.8* 28.5* 28.8*     BNP:  Recent Labs   Lab 01/17/25  0512 01/20/25  0527 01/22/25  0503   * 526* 463*     CMP:  Recent Labs   Lab 01/17/25  0512 01/17/25  1315 01/18/25  1122 01/18/25  1343 01/20/25  0527 01/20/25  0955 01/22/25  0503 01/22/25  1413 01/23/25  0308   *   < >  --    < > 123*   < > 166* 158* 133*   CALCIUM 9.6   < >  --    < > 9.1   < > 9.5 9.1 9.1   ALBUMIN 2.8*  --  2.5*  --  2.8*  --  3.0*  --   --    PROT 10.4*  --  9.6*  --  9.9*  --  10.1*  --   --    *   < >  --    < > 129*   < > 130* 129* 130*   K 3.0*   < >  --    < > 2.7*   < > 2.9* 4.3 3.3*   CO2 30*   < >  --    < > 30*   < > 31* 30* 30*   CL 88*   < >  --    < > 86*   < > 87* 89* 87*   BUN 23*   < >  --    < > 31*   < > 35* 35* 39*   CREATININE 1.7*   < >  --    < > 1.5*   < > 1.7* 1.5* 1.7*   ALKPHOS 193*  --  173*  --  197*  --  206*  --   --    ALT 17  --   --   --  6*  --  5*  --   --    AST 35  --   --   --  27  --  30  --   --    BILITOT 1.9*  --   --   --  1.4*  --  1.2*  --   --     < > = values in this interval not displayed.      Coagulation:   Recent Labs   Lab 01/21/25 0420 01/22/25  0503 01/23/25  0308   INR 2.1* 2.1* 2.1*   APTT 33.6* 33.0* 33.0*     LDH:  Recent Labs   Lab 01/21/25 0420 01/22/25  0503 01/23/25  0308   * 244 235     Microbiology:  Microbiology Results (last 7 days)       Procedure Component Value Units Date/Time    Blood culture [[id number]] Collected: 01/19/25 0318    Order Status: Completed Specimen: Blood Updated: 01/23/25 0812     Blood Culture, Routine No Growth to date       No Growth to date      No Growth to date      No Growth to date      No Growth to date    Blood culture [[id number]] Collected: 01/19/25 0318    Order Status: Completed Specimen: Blood Updated: 01/23/25 0812     Blood Culture, Routine No Growth to date      No Growth to date      No Growth to date      No Growth to date      No Growth to date    (rule out SBP) Aerobic culture [[id number]] Collected: 01/18/25 1454    Order Status: Completed Specimen: Ascites Updated: 01/22/25 0959     Aerobic Bacterial Culture No growth    Narrative:      To rule out SBP order labs: Aerobic culture [LDO374],  Culture, Anaerobic [KUQ437], Gram stain [SSY074], Albumin  [VIR633], Protein [CUE145], LDH [EHU184], WBC \T\ Dff  [NOW2595].    Blood culture [[id number]] Collected: 01/16/25 1006    Order Status: Completed Specimen: Blood Updated: 01/21/25 1212     Blood Culture, Routine No growth after 5 days.    Blood culture [[id number]] Collected: 01/16/25 1006    Order Status: Completed Specimen: Blood Updated: 01/21/25 1212     Blood Culture, Routine No growth after 5 days.    (rule out SBP) Culture, Anaerobic [[id number]] Collected: 01/18/25 1454    Order Status: Completed Specimen: Ascites Updated: 01/20/25 0651     Anaerobic Culture Culture in progress    Narrative:      To rule out SBP order labs: Aerobic culture [QPT739],  Culture, Anaerobic [GTN679], Gram stain [CKW790], Albumin  [CQT729], Protein [HXI027], LDH [KXH859], WBC \T\ Dff  [OQO9110].    (rule out SBP) Gram stain [[id number]] Collected: 01/18/25 1454    Order Status: Completed Specimen: Ascites Updated: 01/18/25 2124     Gram Stain Result No WBC's      No organisms seen    Narrative:      To rule out SBP order labs: Aerobic culture [[id number]],  Culture, Anaerobic [SMD126], Gram stain [FGA465], Albumin  [MAU284], Protein [DQK071], LDH [ZEZ967], WBC \T\ Dff  [ENH7105].    Fungus culture [[id number]] Collected: 01/18/25 1454    Order Status: Sent Specimen: Ascites Updated:  01/18/25 1531    Gram stain [[id number]]     Order Status: Canceled Specimen: Body Fluid     Anaerobic culture [[id number]]     Order Status: Canceled Specimen: Body Fluid     Aerobic culture [[id number]]     Order Status: Canceled Specimen: Peritoneal Fluid             I have reviewed all pertinent labs within the past 24 hours.    Estimated Creatinine Clearance: 49.9 mL/min (A) (based on SCr of 1.7 mg/dL (H)).    Diagnostic Results:  I have reviewed all pertinent imaging results/findings within the past 24 hours.  Assessment and Plan:     No notes on file    * Acute on chronic systolic CHF (congestive heart failure)  -NICM  -Last 2D Echo 1/19/25 with EF 10 -15%. G1 DD. RV systolic function severely reduced. Mod to severe TR. PASP  31 mmHg. IVC/SVC: 15 mmHg.  - Previous Echo 8/26/24 : LVEF 5-10%, LVEDD  7.2 cm  -S/p diuresis with Lasix gtt.  cont Torsemide 60mg BID - I/O: negative 2L for yesterday.   -GDMT with Eplerenone  -2g Na dietary restriction, 1500 mL fluid restriction, strict I/Os      LVAD (left ventricular assist device) present  HeartMate 3 Implanted 6/23/2022 with early RV failure requiring RVAD with ProTek Duo   -Continue Coumadin,  Goal INR 2.0-3.0 . Therapeutic today.  Previous home regimen 1.5mg Qdaily  -Antiplatelets ASA 81 mg, on hold 2/2 GIB history  -LDH is stable overall today. Will continue to monitor daily.  -Speed set at 5100  -Interrogation notable for PI events with 1 LFA on 1/20/25  -Not listed for OHTx    Procedure: Device Interrogation Including analysis of device parameters  Current Settings: Ventricular Assist Device        1/23/2025    12:06 PM 1/23/2025     9:00 AM 1/22/2025    11:52 PM 1/22/2025     8:17 PM 1/22/2025     3:48 PM 1/22/2025    11:19 AM 1/22/2025     9:00 AM   TXP LVAD INTERROGATIONS   Type HeartMate3 HeartMate3 HeartMate3 HeartMate3 HeartMate3 HeartMate3 HeartMate3   Flow 4.2 4.1 4.2 4.1 3.9 3.9 4.2   Speed 5100 5100 5100 5150 5100 5100 5100   PI 4.9 5.6 5.3 5.2 5.9  5.6 4.8   Power (Serna) 3.6 3.7 3.6 3.7 3.6 3.6 3.6   LSL  4700 4700 4700      Pulsatility Intermittent pulse Intermittent pulse Intermittent pulse Intermittent pulse Intermittent pulse Intermittent pulse Intermittent pulse         Ascites  -Will monitor with diuresis   - Labs negative for SBP - follow cultures - NGTD    Hypokalemia  Replace and monitor per ptotocol    HTN (hypertension)  -On home dose of Amlodipine and Eplerenone    Chronic anticoagulation  -See above VAD    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   -Repeat Blood Cx 1/16 negative thus far  -Started on Vanc/Zosyn.  -ID consult.   -Vanc and Zosyn changed to Ancef  -TTE and ophthalmology consult ordered per ID recommendations. No vegetation on TTE and no infectious etiology of blurred vision per ophtho   -diagnostic paracentesis -negative so far, but cultures pending.     Right heart failure secondary to left heart failure-post LVAD surgery  -Weaned off inotropes 8/2024  -See above heart failure     Temporal lobe seizure  -On home dose of Shanthi Ayala MD  Heart Transplant  [facility] - Cardiac Int[facility] Care

## 2025-01-23 NOTE — NURSING
19:00 Received report from RIAN Perez. Patient alert and oriented. No  pain. No sign of distress. Iv line in place- continuous Abx infusing. Tele monitor in place.LVAD in place - all numbers WDL.On room air. Call bell given on his reach. Instructed to call for any concerns or assistance. Bed on lowest position. Non skid socks on. Plan of care discussed with patient, question answered.

## 2025-01-23 NOTE — ASSESSMENT & PLAN NOTE
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 Therapeutic today.  Previous home regimen 1.5mg Qdaily  -Antiplatelets ASA 81 mg, on hold 2/2 GIB history  -LDH is stable overall today. Will continue to monitor daily.  -Speed set at 5100  -Interrogation notable for PI events with 1 LFA on 1/20/25  -Not listed for OHTx    Procedure: Device Interrogation Including analysis of device parameters  Current Settings: Ventricular Assist Device        1/23/2025    12:06 PM 1/23/2025     9:00 AM 1/22/2025    11:52 PM 1/22/2025     8:17 PM 1/22/2025     3:48 PM 1/22/2025    11:19 AM 1/22/2025     9:00 AM   TXP LVAD INTERROGATIONS   Type HeartMate3 HeartMate3 HeartMate3 HeartMate3 HeartMate3 HeartMate3 HeartMate3   Flow 4.2 4.1 4.2 4.1 3.9 3.9 4.2   Speed 5100 5100 5100 5150 5100 5100 5100   PI 4.9 5.6 5.3 5.2 5.9 5.6 4.8   Power (Serna) 3.6 3.7 3.6 3.7 3.6 3.6 3.6   LSL  4700 4700 4700      Pulsatility Intermittent pulse Intermittent pulse Intermittent pulse Intermittent pulse Intermittent pulse Intermittent pulse Intermittent pulse

## 2025-01-23 NOTE — PLAN OF CARE
Problem: Diabetes Comorbidity  Goal: Blood Glucose Level Within Targeted Range  Outcome: Progressing  Intervention: Monitor and Manage Glycemia  Flowsheets (Taken 1/23/2025 0018)  Glycemic Management:   blood glucose monitored   supplemental insulin given     Problem: Heart Failure  Goal: Optimal Coping  Outcome: Progressing  Intervention: Support Psychosocial Response  Flowsheets (Taken 1/23/2025 0018)  Supportive Measures:   active listening utilized   verbalization of feelings encouraged  Goal: Optimal Cardiac Output  Outcome: Progressing  Intervention: Optimize Cardiac Output  Flowsheets (Taken 1/23/2025 0018)  Environmental Support: calm environment promoted  Goal: Fluid and Electrolyte Balance  Outcome: Progressing  Intervention: Monitor and Manage Fluid and Electrolyte Balance  Flowsheets (Taken 1/23/2025 0018)  Fluid/Electrolyte Management: fluids restricted

## 2025-01-23 NOTE — PLAN OF CARE
Problem: Adult Inpatient Plan of Care  Goal: Absence of Hospital-Acquired Illness or Injury  Outcome: Progressing  Goal: Optimal Comfort and Wellbeing  Outcome: Progressing     Problem: Diabetes Comorbidity  Goal: Blood Glucose Level Within Targeted Range  Outcome: Progressing     Problem: Sepsis/Septic Shock  Goal: Absence of Infection Signs and Symptoms  Outcome: Progressing     Problem: Ventricular Assist Device  Goal: Optimal Adjustment to Device  Outcome: Progressing

## 2025-01-23 NOTE — PROGRESS NOTES
DC Planning:    TREY notified that pt will need 6 weeks of IV abx at dc. SW sent referral to [employer] (NO) and notified rep Maya about referral.  Also sent referral to [facility] in [place] via [employer].    Both notified of planning for dc tomorrow.     Attempted to reach pt to discuss dc planning.  Left voicemail and requested a call back.     [employer]: [phone]  [facility]: [facility] [phone], fax: [phone]     Addendum: 2:30p    Received response from Margaret with [facility].  Pt declined for social reasons.    [facility]REY sent referral to [facility] [place].  They declined.     Pt's mom answered cell listed.  She advised pt has no cell phone with him.  TREY spoke with pt's nurse (Ana Reilly) with update. Requested that she update pt that dc planning is in process, but not finalized yet.     TREY will follow up tomorrow.

## 2025-01-23 NOTE — PLAN OF CARE
[facility]   Heart Transplant/VAD Clinic   [address] [place] [address]   [phone] after hours          [phone] fax     VAD HOME  HEALTH ORDERS      Admit to Home Health    Diagnosis:   Patient Active Problem List   Diagnosis    Acute on chronic systolic CHF (congestive heart failure)    Anxiety    Anemia of chronic disease    Ecstasy abuse    Cannabis use disorder, severe, dependence    CHF (NYHA class IV, ACC/AHA stage D)    Tobacco use    Chronic combined systolic and diastolic heart failure    Type 2 diabetes mellitus with hyperglycemia    Insomnia    Tachycardia    Palliative care encounter    Familial dilated cardiomyopathy    LVAD (left ventricular assist device) present    Hyponatremia    Seizure-like activity    Temporal lobe seizure    Right heart failure secondary to left heart failure-post LVAD surgery    History of substance abuse    MSSA bacteremia    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)    Pleural effusion    Noncompliance with medication regimen    Moderate malnutrition    Receiving inotropic medication    Chronic anticoagulation    Type 2 diabetes mellitus with hyperosmolar hyperglycemic state (HHS)    HTN (hypertension)    Acute decompensated heart failure    Type 2 diabetes mellitus with hyperglycemia, with long-term current use of insulin    Muscle cramping    Goals of care, counseling/discussion    Advance care planning    Hypokalemia    Bilateral back pain    Supratherapeutic INR    Subcutaneous nodule of breast    Polysubstance abuse    Atrial flutter    Pain    Dyspnea    Advanced care planning/counseling discussion    Pulmonary nodules    Fungemia    Anticoagulated    Ascites    Acute on chronic systolic congestive heart failure       Patient is homebound due to:  NYHA Class IV HF. S/P LVAD placement.     Diet: Low Fat, Low cholesterol, 2Gm Na, Coumadin restrictions.    Acitivities: No  Swimming, bathing, vacuuming, contact sports.    Fresh implants= Sternal Precautions    Nursing:   SN to complete comprehensive assessment including routine vital signs. Instruct on disease process and s/s of complications to report to MD. Review/verify medication list sent home with the patient at time of discharge  and instruct patient/caregiver as needed. Frequency may be adjusted depending on start of care date.    **LVAD driveline exit site dressing change is to be completed per LVAD patient/caregiver only**.    Notify MD if:  SBP > 120 or < 80;   MAP > 80 or < 65;   HR > 120 or < 60;   Temp > 101;   Weight gain >3lbs in 1 day or 5lbs in 1 week.    LABS:  SN to perform labs: PT/INR per Coumadin clinic [phone].   Follow up INR date: Per coumadin clinic  No Finger Sticks    HOME INFUSION THERAPY:  SN to perform Infusion Therapy/Central Line Care.  Review Central Line Care & Central Line Flush with patient.    Administer (drug and dose): cefazolin 6g q 24 hours, end date 2/26/25     PICC/Central line care: OK to leave Midline in place till end of therapy on 2/26/25  Scrub the Hub: Prior to accessing the line, always perform a 30 second alcohol scrub  Each lumen of the central line is to be flushed at least daily with 10 mL Normal Saline and 3 mL Heparin flush (100 units/mL)  Skilled Nurse (SN) may draw blood from IV access  Blood Draw Procedure:   - Aspirate at least 5 mL of blood   - Discard   - Obtain specimen   - Change posiflow cap   - Flush with 20 mL Normal Saline followed by a                 3-5 mL Heparin flush (100 units/mL)  :   - Sterile dressing changes are done weekly and as needed.   - Use chlor-hexadine scrub to cleanse site, apply Biopatch to insertion site,       apply securement device dressing   - Posi-flow caps are changed weekly and after EVERY lab draw.   - If sterile gauze is under dressing to control oozing,                 dressing change must be performed every 24  hours until gauze is not needed.    CONSULTS:      Physical Therapy to evaluate and treat. Evaluate for home safety and equipment needs; Establish/upgrade home exercise program. Perform / instruct on therapeutic exercises, gait training, transfer training, and Range of Motion.    Occupational Therapy to evaluate and treat. Evaluate home environment for safety and equipment needs. Perform/Instruct on transfers, ADL training, ROM, and therapeutic exercises.    Speech Therapy  to evaluate and treat for:  Language  Swallowing  Cognition                                                          to evaluate for community resources/long-range planning.     Aide to provide assistance with personal care, ADLs, and vital signs    Send initial Home Health orders to [facility] attending physician on call.  Send follow up questions to VAD clinic MD [phone] or [phone].

## 2025-01-24 ENCOUNTER — TELEPHONE (OUTPATIENT)
Dept: OPHTHALMOLOGY | Facility: CLINIC | Age: 40
End: 2025-01-24
Payer: MEDICAID

## 2025-01-24 ENCOUNTER — TELEPHONE (OUTPATIENT)
Dept: TRANSPLANT | Facility: CLINIC | Age: 40
End: 2025-01-24
Payer: MEDICAID

## 2025-01-24 LAB
ALBUMIN SERPL BCP-MCNC: 2.8 G/DL (ref 3.5–5.2)
ALP SERPL-CCNC: 209 U/L (ref 40–150)
ALT SERPL W/O P-5'-P-CCNC: 10 U/L (ref 10–44)
ANION GAP SERPL CALC-SCNC: 10 MMOL/L (ref 8–16)
APTT PPP: 30.6 SEC (ref 21–32)
AST SERPL-CCNC: 43 U/L (ref 10–40)
BACTERIA BLD CULT: NORMAL
BACTERIA BLD CULT: NORMAL
BASOPHILS # BLD AUTO: 0.05 K/UL (ref 0–0.2)
BASOPHILS NFR BLD: 0.7 % (ref 0–1.9)
BILIRUB DIRECT SERPL-MCNC: 0.7 MG/DL (ref 0.1–0.3)
BILIRUB SERPL-MCNC: 1.1 MG/DL (ref 0.1–1)
BNP SERPL-MCNC: 221 PG/ML (ref 0–99)
BUN SERPL-MCNC: 30 MG/DL (ref 6–20)
CALCIUM SERPL-MCNC: 8.9 MG/DL (ref 8.7–10.5)
CHLORIDE SERPL-SCNC: 92 MMOL/L (ref 95–110)
CO2 SERPL-SCNC: 29 MMOL/L (ref 23–29)
CREAT SERPL-MCNC: 1.3 MG/DL (ref 0.5–1.4)
CRP SERPL-MCNC: 6.7 MG/L (ref 0–8.2)
DIFFERENTIAL METHOD BLD: ABNORMAL
EOSINOPHIL # BLD AUTO: 0 K/UL (ref 0–0.5)
EOSINOPHIL NFR BLD: 0.4 % (ref 0–8)
ERYTHROCYTE [DISTWIDTH] IN BLOOD BY AUTOMATED COUNT: 26.7 % (ref 11.5–14.5)
EST. GFR  (NO RACE VARIABLE): >60 ML/MIN/1.73 M^2
GLUCOSE SERPL-MCNC: 167 MG/DL (ref 70–110)
HCT VFR BLD AUTO: 30.2 % (ref 40–54)
HGB BLD-MCNC: 8.8 G/DL (ref 14–18)
IMM GRANULOCYTES # BLD AUTO: 0.02 K/UL (ref 0–0.04)
IMM GRANULOCYTES NFR BLD AUTO: 0.3 % (ref 0–0.5)
INR PPP: 1.9 (ref 0.8–1.2)
LDH SERPL L TO P-CCNC: 695 U/L (ref 110–260)
LYMPHOCYTES # BLD AUTO: 1.1 K/UL (ref 1–4.8)
LYMPHOCYTES NFR BLD: 14.4 % (ref 18–48)
MAGNESIUM SERPL-MCNC: 2.3 MG/DL (ref 1.6–2.6)
MCH RBC QN AUTO: 18.3 PG (ref 27–31)
MCHC RBC AUTO-ENTMCNC: 29.1 G/DL (ref 32–36)
MCV RBC AUTO: 63 FL (ref 82–98)
MONOCYTES # BLD AUTO: 0.6 K/UL (ref 0.3–1)
MONOCYTES NFR BLD: 8.1 % (ref 4–15)
NEUTROPHILS # BLD AUTO: 5.8 K/UL (ref 1.8–7.7)
NEUTROPHILS NFR BLD: 76.1 % (ref 38–73)
NRBC BLD-RTO: 0 /100 WBC
PHOSPHATE SERPL-MCNC: 3.1 MG/DL (ref 2.7–4.5)
PLATELET # BLD AUTO: 324 K/UL (ref 150–450)
PMV BLD AUTO: ABNORMAL FL (ref 9.2–12.9)
POCT GLUCOSE: 133 MG/DL (ref 70–110)
POCT GLUCOSE: 189 MG/DL (ref 70–110)
POCT GLUCOSE: 194 MG/DL (ref 70–110)
POTASSIUM SERPL-SCNC: 4 MMOL/L (ref 3.5–5.1)
PREALB SERPL-MCNC: 20 MG/DL (ref 20–43)
PROT SERPL-MCNC: 9.5 G/DL (ref 6–8.4)
PROTHROMBIN TIME: 19.4 SEC (ref 9–12.5)
RBC # BLD AUTO: 4.82 M/UL (ref 4.6–6.2)
SODIUM SERPL-SCNC: 131 MMOL/L (ref 136–145)
WBC # BLD AUTO: 7.62 K/UL (ref 3.9–12.7)

## 2025-01-24 PROCEDURE — C1751 CATH, INF, PER/CENT/MIDLINE: HCPCS

## 2025-01-24 PROCEDURE — 25000003 PHARM REV CODE 250

## 2025-01-24 PROCEDURE — 83880 ASSAY OF NATRIURETIC PEPTIDE: CPT | Performed by: INTERNAL MEDICINE

## 2025-01-24 PROCEDURE — 85610 PROTHROMBIN TIME: CPT | Performed by: INTERNAL MEDICINE

## 2025-01-24 PROCEDURE — 25000003 PHARM REV CODE 250: Performed by: STUDENT IN AN ORGANIZED HEALTH CARE EDUCATION/TRAINING PROGRAM

## 2025-01-24 PROCEDURE — 85730 THROMBOPLASTIN TIME PARTIAL: CPT | Performed by: INTERNAL MEDICINE

## 2025-01-24 PROCEDURE — 84100 ASSAY OF PHOSPHORUS: CPT | Performed by: INTERNAL MEDICINE

## 2025-01-24 PROCEDURE — 25000003 PHARM REV CODE 250: Performed by: INTERNAL MEDICINE

## 2025-01-24 PROCEDURE — 83735 ASSAY OF MAGNESIUM: CPT | Performed by: INTERNAL MEDICINE

## 2025-01-24 PROCEDURE — 94761 N-INVAS EAR/PLS OXIMETRY MLT: CPT

## 2025-01-24 PROCEDURE — 20600001 HC STEP DOWN PRIVATE ROOM

## 2025-01-24 PROCEDURE — 27000248 HC VAD-ADDITIONAL DAY

## 2025-01-24 PROCEDURE — 80048 BASIC METABOLIC PNL TOTAL CA: CPT | Performed by: INTERNAL MEDICINE

## 2025-01-24 PROCEDURE — 84134 ASSAY OF PREALBUMIN: CPT | Performed by: INTERNAL MEDICINE

## 2025-01-24 PROCEDURE — 86140 C-REACTIVE PROTEIN: CPT | Performed by: INTERNAL MEDICINE

## 2025-01-24 PROCEDURE — 85025 COMPLETE CBC W/AUTO DIFF WBC: CPT | Performed by: INTERNAL MEDICINE

## 2025-01-24 PROCEDURE — 83615 LACTATE (LD) (LDH) ENZYME: CPT | Performed by: INTERNAL MEDICINE

## 2025-01-24 PROCEDURE — 99232 SBSQ HOSP IP/OBS MODERATE 35: CPT | Mod: ,,, | Performed by: PHYSICIAN ASSISTANT

## 2025-01-24 PROCEDURE — 25000003 PHARM REV CODE 250: Performed by: NURSE PRACTITIONER

## 2025-01-24 PROCEDURE — 80076 HEPATIC FUNCTION PANEL: CPT | Performed by: INTERNAL MEDICINE

## 2025-01-24 PROCEDURE — 63600175 PHARM REV CODE 636 W HCPCS: Performed by: INTERNAL MEDICINE

## 2025-01-24 PROCEDURE — 36410 VNPNXR 3YR/> PHY/QHP DX/THER: CPT

## 2025-01-24 RX ADMIN — HYPROMELLOSE 2910 1 DROP: 5 SOLUTION/ DROPS OPHTHALMIC at 03:01

## 2025-01-24 RX ADMIN — HYPROMELLOSE 2910 1 DROP: 5 SOLUTION/ DROPS OPHTHALMIC at 12:01

## 2025-01-24 RX ADMIN — METHOCARBAMOL 500 MG: 500 TABLET ORAL at 05:01

## 2025-01-24 RX ADMIN — INSULIN ASPART 8 UNITS: 100 INJECTION, SOLUTION INTRAVENOUS; SUBCUTANEOUS at 01:01

## 2025-01-24 RX ADMIN — Medication 6 MG: at 08:01

## 2025-01-24 RX ADMIN — INSULIN GLARGINE 10 UNITS: 100 INJECTION, SOLUTION SUBCUTANEOUS at 09:01

## 2025-01-24 RX ADMIN — LEVETIRACETAM 1500 MG: 500 TABLET, FILM COATED ORAL at 09:01

## 2025-01-24 RX ADMIN — Medication 800 MG: at 08:01

## 2025-01-24 RX ADMIN — ASPIRIN 81 MG: 81 TABLET, COATED ORAL at 09:01

## 2025-01-24 RX ADMIN — EMPAGLIFLOZIN 10 MG: 10 TABLET, FILM COATED ORAL at 11:01

## 2025-01-24 RX ADMIN — INSULIN ASPART 8 UNITS: 100 INJECTION, SOLUTION INTRAVENOUS; SUBCUTANEOUS at 09:01

## 2025-01-24 RX ADMIN — METHOCARBAMOL 500 MG: 500 TABLET ORAL at 08:01

## 2025-01-24 RX ADMIN — AMLODIPINE BESYLATE 10 MG: 10 TABLET ORAL at 09:01

## 2025-01-24 RX ADMIN — HYPROMELLOSE 2910 1 DROP: 5 SOLUTION/ DROPS OPHTHALMIC at 09:01

## 2025-01-24 RX ADMIN — DEXTROSE MONOHYDRATE 6 G: 25000 INJECTION, SOLUTION INTRAVENOUS at 03:01

## 2025-01-24 RX ADMIN — INSULIN ASPART 8 UNITS: 100 INJECTION, SOLUTION INTRAVENOUS; SUBCUTANEOUS at 05:01

## 2025-01-24 RX ADMIN — DULOXETINE HYDROCHLORIDE 30 MG: 30 CAPSULE, DELAYED RELEASE ORAL at 09:01

## 2025-01-24 RX ADMIN — GABAPENTIN 100 MG: 100 CAPSULE ORAL at 08:01

## 2025-01-24 RX ADMIN — PANTOPRAZOLE SODIUM 40 MG: 40 TABLET, DELAYED RELEASE ORAL at 09:01

## 2025-01-24 RX ADMIN — TORSEMIDE 60 MG: 20 TABLET ORAL at 05:01

## 2025-01-24 RX ADMIN — METHOCARBAMOL 500 MG: 500 TABLET ORAL at 09:01

## 2025-01-24 RX ADMIN — Medication 800 MG: at 09:01

## 2025-01-24 RX ADMIN — METHOCARBAMOL 500 MG: 500 TABLET ORAL at 01:01

## 2025-01-24 RX ADMIN — AMIODARONE HYDROCHLORIDE 200 MG: 200 TABLET ORAL at 09:01

## 2025-01-24 RX ADMIN — ATORVASTATIN CALCIUM 40 MG: 40 TABLET, FILM COATED ORAL at 09:01

## 2025-01-24 RX ADMIN — HYPROMELLOSE 2910 1 DROP: 5 SOLUTION/ DROPS OPHTHALMIC at 08:01

## 2025-01-24 RX ADMIN — EPLERENONE 50 MG: 25 TABLET, FILM COATED ORAL at 09:01

## 2025-01-24 RX ADMIN — LEVETIRACETAM 1500 MG: 500 TABLET, FILM COATED ORAL at 08:01

## 2025-01-24 RX ADMIN — TORSEMIDE 60 MG: 20 TABLET ORAL at 09:01

## 2025-01-24 RX ADMIN — GABAPENTIN 400 MG: 400 CAPSULE ORAL at 08:01

## 2025-01-24 RX ADMIN — WARFARIN SODIUM 1.5 MG: 1 TABLET ORAL at 05:01

## 2025-01-24 RX ADMIN — GABAPENTIN 100 MG: 100 CAPSULE ORAL at 09:01

## 2025-01-24 NOTE — ASSESSMENT & PLAN NOTE
BG goal: 140-180  T2DM. Bacteremic.  BG mostly stable.    - Jardiance 10 mg daily (home medication; started per primary team)  -Continue Lantus 10 units once daily  -Continue Novolog 8 units TID with meals  -Continue Low Dose Correction Scale   - POCT Glucose before meals and at bedtime  - Hypoglycemia protocol in place      ** Please notify Endocrine for any change and/or advance in diet**  ** Please call Endocrine for any BG related issues **     Discharge Planning:   TBD. Please notify endocrinology prior to discharge.

## 2025-01-24 NOTE — TELEPHONE ENCOUNTER
----- Message from Tech Hedy sent at 1/24/2025 10:07 AM CST -----  Regarding: FW: Inpatient follow-up  Good morning    Please call pt to schedule.     Amanda  Hedy  ----- Message -----  From: Griffin Waller MD  Sent: 1/18/2025   5:21 PM CST  To: [facility] Ophthalmology Triage  Subject: Inpatient follow-up                              Hello,    This patient was seen as an inpatient for chorioretinal scar, left eye. Can they please get follow-up in retina clinic in 2-3 months?    Best contact number: [phone]    Please let me know if unable to schedule this patient.    Thank you!    Griffin Waller MD  [facility] PGY2

## 2025-01-24 NOTE — SUBJECTIVE & OBJECTIVE
"Interval HPI:   No acute events overnight. Patient in room [id number] A. Blood glucose stable. BG at and above goal on current insulin regimen (SSI, prandial, and basal insulin ). Steroid use- None .      Renal function- Normal   Vasopressors-  None     Diet Cardiac Low Sodium,2gm, Double Portions; Fluid - 2000mL     Eatin%  Nausea: No  Hypoglycemia and intervention: No  Fever: No  TPN and/or TF: No    BP (!) 78/0 (BP Location: Right arm, Patient Position: Lying)   Pulse 79   Temp 97.8 °F (36.6 °C) (Oral)   Resp 16   Ht 6' 3" (1.905 m)   Wt 60.5 kg (133 lb 6.1 oz)   SpO2 98%   BMI 16.67 kg/m²     Labs Reviewed and Include    Recent Labs   Lab 25  1746   GLU 69*   CALCIUM 9.0   *   K 3.2*   CO2 29   CL 89*   BUN 37*   CREATININE 1.3     Lab Results   Component Value Date    WBC 11.37 2025    HGB 8.7 (L) 2025    HCT 30.2 (L) 2025    MCV 62 (L) 2025     2025     No results for input(s): "TSH", "FREET4" in the last 168 hours.  Lab Results   Component Value Date    HGBA1C 10.6 (H) 2024       Nutritional status:   Body mass index is 16.67 kg/m².  Lab Results   Component Value Date    ALBUMIN 3.0 (L) 2025    ALBUMIN 2.8 (L) 2025    ALBUMIN 2.5 (L) 2025     Lab Results   Component Value Date    PREALBUMIN 18 (L) 2025    PREALBUMIN 15 (L) 2025    PREALBUMIN 12 (L) 2025       Estimated Creatinine Clearance: 65.3 mL/min (based on SCr of 1.3 mg/dL).    Accu-Checks  Recent Labs     25  2338 25  0859 25  1258 25  1625 25  2003 25  0931 25  1340 25  1612 25  1950 25  0900   POCTGLUCOSE 178* 141* 150* 176* 189* 181* 301* 112* 103 194*       Current Medications and/or Treatments Impacting Glycemic Control  Immunotherapy:    Immunosuppressants       None          Steroids:   Hormones (From admission, onward)      Start     Stop Route Frequency Ordered    25 " 0147  melatonin tablet 6 mg         -- Oral Nightly PRN 01/20/25 0048          Pressors:    Autonomic Drugs (From admission, onward)      None          Hyperglycemia/Diabetes Medications:   Antihyperglycemics (From admission, onward)      Start     Stop Route Frequency Ordered    01/22/25 0730  insulin aspart U-100 pen 8 Units         -- SubQ 3 times daily with meals 01/22/25 0721    01/19/25 1206  insulin aspart U-100 pen 0-5 Units         -- SubQ Before meals & nightly PRN 01/19/25 1106    01/15/25 0900  empagliflozin (Jardiance) tablet 10 mg        Question Answer Comment   Does this patient have a diagnosis of heart failure? Yes    Does this patient have type 1 diabetes or diabetic ketoacidosis? No    Does this patient have symptomatic hypotension? No    Is the patient NPO or pending major surgery in next 3 days or less? No        -- Oral Daily 01/15/25 0312    01/15/25 0900  insulin glargine U-100 (Lantus) pen 10 Units         -- SubQ Daily 01/15/25 0312

## 2025-01-24 NOTE — ASSESSMENT & PLAN NOTE
-NICM  -Last 2D Echo 1/19/25 with EF 10 -15%. G1 DD. RV systolic function severely reduced. Mod to severe TR. PASP  31 mmHg. IVC/SVC: 15 mmHg.  -S/p diuresis with Lasix gtt and transitioned to po Torsemide 60mg BID    -GDMT with Eplerenone  -2g Na dietary restriction, 1500 mL fluid restriction, strict I/Os

## 2025-01-24 NOTE — ASSESSMENT & PLAN NOTE
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 Mildly subtherapeutic today.  Previous home regimen 1.5mg Qdaily  -Antiplatelets ASA 81 mg, on hold 2/2 GIB history  -LDH is stable overall today. Will continue to monitor daily.  -Speed set at 5100  -Interrogation notable for PI events with 1 LFA on 1/20/25  -Not listed for OHTx    Procedure: Device Interrogation Including analysis of device parameters  Current Settings: Ventricular Assist Device        1/24/2025    12:37 PM 1/24/2025     7:57 AM 1/24/2025     5:38 AM 1/24/2025    12:08 AM 1/23/2025     8:21 PM 1/23/2025     4:17 PM 1/23/2025    12:06 PM   TXP LVAD INTERROGATIONS   Type HeartMate3 HeartMate3 HeartMate3 HeartMate3 HeartMate3 HeartMate3 HeartMate3   Flow 4.1 4.1 42 3.9 4 3.9 4.2   Speed 5100 5100 5100 5100 5100 5100 5100   PI 5 5.5 5.3 6.4 5.9 6.1 4.9   Power (Serna) 3.6 3.6 3.5 3.6 3.6 3.7 3.6   LSL 4700 4700 4700 4700 4700     Pulsatility Intermittent pulse  Intermittent pulse Intermittent pulse Intermittent pulse Intermittent pulse Intermittent pulse

## 2025-01-24 NOTE — ASSESSMENT & PLAN NOTE
-Blood Cx 1/14 growing staph.   -Repeat Blood Cx 1/16 negative thus far  -Started on Vanc/Zosyn.  -ID consult.   -Vanc and Zosyn changed to Ancef  -TTE and ophthalmology consult ordered per ID recommendations. No vegetation on TTE and no infectious etiology of blurred vision per ophtho   -diagnostic paracentesis -negative so far, but cultures pending.   -Midline consult placed  -Planning for Cefazolin until 2/26/25

## 2025-01-24 NOTE — CONSULTS
[facility]   [facility] has documented failed attempts at PICC line placement. Pt needed to go to IR for tunneled line placement in September 2024. Would recommend IR for long-term access.   Ordering provider, Angela FALK, and bedside RN notified.

## 2025-01-24 NOTE — PROGRESS NOTES
"[facility] - Cardiac Intensive Care  Adult Nutrition  Progress Note    SUMMARY       Recommendations    Continue cardiac low sodium, 2 gm diet as tolerated.  RD to monitor and follow up.    Goals: Meet % EEN/EPN by RD follow up.  Nutrition Goal Status: goal met  Communication of RD Recs: other (comment) (POC)    Assessment and Plan    Nutrition Problem  Increased nutrient needs (protein)     Related to (etiology):   Increased physiological needs     Signs and Symptoms (as evidenced by):   LVAD present       Interventions/Recommendations (treatment strategy):  Collab of care w other providers      Nutrition Diagnosis Status:   Continues           Reason for Assessment    Reason For Assessment: RD follow-up  Diagnosis:  (Acute on chronic systolic CHF (congestive heart failure))  General Information Comments: Pt on a cardiac low sodium diet (double portions) w/ a 2000 ml fluid restriction. Consuming % of meals. No N/V or other symptoms noted.  Nutrition Discharge Planning: Cardiac-diabetic diet    Nutrition Related Social Determinants of Health: SDOH: None Identified      Nutrition/Diet History    Nutrition Intake History: Adequate appetite/intake  Food Preferences: None  Spiritual, Cultural Beliefs, Orthodox Practices, Values that Affect Care: no  Food Allergies: NKFA  Factors Affecting Nutritional Intake: None identified at this time    Anthropometrics    Height: 6' 3" (190.5 cm)  Height (inches): 75 in  Weight: 60.5 kg (133 lb 6.1 oz)  Weight (lb): 133.38 lb  Weight Method: Standard Scale  Ideal Body Weight (IBW), Male: 196 lb  % Ideal Body Weight, Male (lb): 76.53 %  BMI (Calculated): 16.7  BMI Grade: less than 18.5 - underweight  Usual Body Weight (UBW), k.36 kg  % Usual Body Weight: 79.02       Lab/Procedures/Meds    Pertinent Labs Reviewed: reviewed  Pertinent Labs Comments: H/H 8.7/30.2, potassium 3.2, glucose 69, albumin 3.0, prealbumin 18, ALT 5, CRP 8.9  Pertinent Medications Reviewed: " reviewed  Pertinent Medications Comments: Amiodarone, aspirin, atorvastatin, jardiance, eplerenone, insulin, magnesium oxide, pantoprazole, warfarin, torsemide    Estimated/Assessed Needs    Weight Used For Calorie Calculations: 68.1 kg (150 lb 2.1 oz)  Energy Calorie Requirements (kcal): 2102  Energy Need Method: Bureau-St Jeor (x 1.25 PAL)  Protein Requirements: 68-82 (1-1.2 g/kg ABW)  Weight Used For Protein Calculations: 68.1 kg (150 lb 2.1 oz)     Estimated Fluid Requirement Method:  (Per MD)  RDA Method (mL): 2102  CHO Requirement: 236-262 g (45-50% EEN)      Nutrition Prescription Ordered    Current Diet Order: Cardiac low sodium, 2 gm  Nutrition Order Comments: Double portions, 2000 ml fluid restriction    Evaluation of Received Nutrient/Fluid Intake    I/O: - 24,457.3 net I/O  Comments: LBM 1/23  % Intake of Estimated Energy Needs: 75 - 100 %  % Meal Intake: 75 - 100 %    Nutrition Risk    Level of Risk/Frequency of Follow-up:  (1x/week)     Monitor and Evaluation    Food and Nutrient Intake: food and beverage intake, energy intake  Food and Nutrient Adminstration: diet order  Anthropometric Measurements: body mass index, weight change  Biochemical Data, Medical Tests and Procedures: lipid profile, inflammatory profile, glucose/endocrine profile, gastrointestinal profile, electrolyte and renal panel  Nutrition-Focused Physical Findings: overall appearance     Nutrition Follow-Up    RD Follow-up?: Yes    Lilian Valencia, Registration Eligible, Provisional LDN

## 2025-01-24 NOTE — PROGRESS NOTES
"[facility] - Cardiac Intensive Care  Endocrinology  Progress Note    Admit Date: 1/15/2025     Reason for Consult: Management of T2DM, Hyperglycemia      Surgical Procedure and Date: LVAD 2022      Diabetes diagnosis year:      Home Diabetes Medications:    - Lantus 20 units daily  - Novolog SSI with meals   - Novolog SSI  150 - 200 + 2 unit    201 - 250 + 4 units    251 - 300 + 6 units    301 - 350 + 8 units       > 350   + 10 units  - Jardiance 10 mg       How often checking glucose at home - 3x daily   BG readings on regimen: " pt does not recall"  Hypoglycemia on the regimen? No  Missed doses on regimen?  No     Diabetes Complications include:   Hyperglycemia     Complicating diabetes co morbidities:   History of MI, CHF, and CKD        HPI:   Patient is a 39 y.o. 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 He underwent RVAD removal and chest closure 2022. He is off inotropes. He presents as a transfer from OSH for sepsis.  Endocrine consulted for bg management.    Interval HPI:   No acute events overnight. Patient in room [id number]/[id number] A. Blood glucose stable. BG at and above goal on current insulin regimen (SSI, prandial, and basal insulin ). Steroid use- None .      Renal function- Normal   Vasopressors-  None     Diet Cardiac Low Sodium,2gm, Double Portions; Fluid - 2000mL     Eatin%  Nausea: No  Hypoglycemia and intervention: No  Fever: No  TPN and/or TF: No    BP (!) 78/0 (BP Location: Right arm, Patient Position: Lying)   Pulse 79   Temp 97.8 °F (36.6 °C) (Oral)   Resp 16   Ht 6' 3" (1.905 m)   Wt 60.5 kg (133 lb 6.1 oz)   SpO2 98%   BMI 16.67 kg/m²     Labs Reviewed and Include    Recent Labs   Lab 25  1746   GLU 69*   CALCIUM 9.0   *   K 3.2*   CO2 29   CL 89*   BUN " "37*   CREATININE 1.3     Lab Results   Component Value Date    WBC 11.37 01/23/2025    HGB 8.7 (L) 01/23/2025    HCT 30.2 (L) 01/23/2025    MCV 62 (L) 01/23/2025     01/23/2025     No results for input(s): "TSH", "FREET4" in the last 168 hours.  Lab Results   Component Value Date    HGBA1C 10.6 (H) 12/31/2024       Nutritional status:   Body mass index is 16.67 kg/m².  Lab Results   Component Value Date    ALBUMIN 3.0 (L) 01/22/2025    ALBUMIN 2.8 (L) 01/20/2025    ALBUMIN 2.5 (L) 01/18/2025     Lab Results   Component Value Date    PREALBUMIN 18 (L) 01/22/2025    PREALBUMIN 15 (L) 01/20/2025    PREALBUMIN 12 (L) 01/17/2025       Estimated Creatinine Clearance: 65.3 mL/min (based on SCr of 1.3 mg/dL).    Accu-Checks  Recent Labs     01/21/25  2338 01/22/25  0859 01/22/25  1258 01/22/25  1625 01/22/25  2003 01/23/25  0931 01/23/25  1340 01/23/25  1612 01/23/25  1950 01/24/25  0900   POCTGLUCOSE 178* 141* 150* 176* 189* 181* 301* 112* 103 194*       Current Medications and/or Treatments Impacting Glycemic Control  Immunotherapy:    Immunosuppressants       None          Steroids:   Hormones (From admission, onward)      Start     Stop Route Frequency Ordered    01/20/25 0147  melatonin tablet 6 mg         -- Oral Nightly PRN 01/20/25 0048          Pressors:    Autonomic Drugs (From admission, onward)      None          Hyperglycemia/Diabetes Medications:   Antihyperglycemics (From admission, onward)      Start     Stop Route Frequency Ordered    01/22/25 0730  insulin aspart U-100 pen 8 Units         -- SubQ 3 times daily with meals 01/22/25 0721    01/19/25 1206  insulin aspart U-100 pen 0-5 Units         -- SubQ Before meals & nightly PRN 01/19/25 1106    01/15/25 0900  empagliflozin (Jardiance) tablet 10 mg        Question Answer Comment   Does this patient have a diagnosis of heart failure? Yes    Does this patient have type 1 diabetes or diabetic ketoacidosis? No    Does this patient have symptomatic " hypotension? No    Is the patient NPO or pending major surgery in next 3 days or less? No        -- Oral Daily 01/15/25 0312    01/15/25 0900  insulin glargine U-100 (Lantus) pen 10 Units         -- SubQ Daily 01/15/25 0312            ASSESSMENT and PLAN    Cardiac/Vascular  * Acute on chronic systolic CHF (congestive heart failure)  Managed per primary team  Optimize bg control      LVAD (left ventricular assist device) present  Managed per primary team  Optimize bg control        Endocrine  Type 2 diabetes mellitus with hyperglycemia  BG goal: 140-180  T2DM. Bacteremic.  BG mostly stable.    - Jardiance 10 mg daily (home medication; started per primary team)  -Continue Lantus 10 units once daily  -Continue Novolog 8 units TID with meals  -Continue Low Dose Correction Scale   - POCT Glucose before meals and at bedtime  - Hypoglycemia protocol in place      ** Please notify Endocrine for any change and/or advance in diet**  ** Please call Endocrine for any BG related issues **     Discharge Planning:   TBD. Please notify endocrinology prior to discharge.            Wilmer Quesada PA-C  Endocrinology  [facility] - [facility]

## 2025-01-24 NOTE — PLAN OF CARE
Recommendations    Continue cardiac low sodium, 2 gm diet as tolerated.  RD to monitor and follow up.    Goals: Meet % EEN/EPN by RD follow up.  Nutrition Goal Status: goal met  Communication of RD Recs: other (comment) (POC)

## 2025-01-24 NOTE — SUBJECTIVE & OBJECTIVE
Interval History: Patient with no new complaints today.  Plan is to discharge on Cefazolin until 2/26 once line is placed.  Line team consulted for midline placement; cleared with infusion company.  Hoping for discharge tomorrow     Paracentesis without signs of SBP on labs, cultures pending.    Scheduled Meds:   amiodarone  200 mg Oral Daily    amLODIPine  10 mg Oral Daily    artificial tears  1 drop Both Eyes QID WAKE    aspirin  81 mg Oral Daily    atorvastatin  40 mg Oral Daily    ceFAZolin (Ancef) 6 g in D5W 500 mL CONTINUOUS INFUSION  6 g Intravenous Q24H    DULoxetine  30 mg Oral Daily    empagliflozin  10 mg Oral Daily    eplerenone  50 mg Oral Daily    gabapentin  100 mg Oral BID    And    gabapentin  400 mg Oral QHS    insulin aspart U-100  8 Units Subcutaneous TIDWM    insulin glargine U-100  10 Units Subcutaneous Daily    levETIRAcetam  1,500 mg Oral BID    magnesium oxide  800 mg Oral BID    methocarbamoL  500 mg Oral QID    pantoprazole  40 mg Oral Daily    polyethylene glycol  17 g Oral Daily    torsemide  60 mg Oral BID loop    warfarin  1.5 mg Oral Daily     PRN Meds:  Current Facility-Administered Medications:     acetaminophen, 650 mg, Oral, Q6H PRN    dextrose 50%, 12.5 g, Intravenous, PRN    dextrose 50%, 25 g, Intravenous, PRN    glucagon (human recombinant), 1 mg, Intramuscular, PRN    glucose, 16 g, Oral, PRN    glucose, 24 g, Oral, PRN    insulin aspart U-100, 0-5 Units, Subcutaneous, QID (AC + HS) PRN    melatonin, 6 mg, Oral, Nightly PRN    ondansetron, 4 mg, Oral, Q8H PRN    senna-docusate 8.6-50 mg, 1 tablet, Oral, Daily PRN    Review of patient's allergies indicates:   Allergen Reactions    Bumex [bumetanide] Hives     Objective:     Vital Signs (Most Recent):  Temp: 98.4 °F (36.9 °C) (01/24/25 1230)  Pulse: 84 (01/24/25 1434)  Resp: 18 (01/24/25 1230)  BP: (!) 78/0 (01/24/25 1230)  SpO2: 99 % (01/24/25 1230) Vital Signs (24h Range):  Temp:  [97.7 °F (36.5 °C)-98.4 °F (36.9 °C)] 98.4 °F  (36.9 °C)  Pulse:  [78-86] 84  Resp:  [16-19] 18  SpO2:  [96 %-100 %] 99 %  BP: (78-88)/(0) 78/0     Patient Vitals for the past 72 hrs (Last 3 readings):   Weight   01/24/25 0509 60.5 kg (133 lb 6.1 oz)   01/23/25 0445 60.5 kg (133 lb 6.1 oz)   01/22/25 0415 59.1 kg (130 lb 4.7 oz)     Body mass index is 16.67 kg/m².      Intake/Output Summary (Last 24 hours) at 1/24/2025 1438  Last data filed at 1/24/2025 1251  Gross per 24 hour   Intake 1826 ml   Output 4225 ml   Net -2399 ml       Telemetry: NSR 90s       Physical Exam  Vitals and nursing note reviewed.   Constitutional:       Appearance: He is well-developed.   HENT:      Head: Normocephalic.   Eyes:      Pupils: Pupils are equal, round, and reactive to light.   Cardiovascular:      Rate and Rhythm: Normal rate and regular rhythm.      Heart sounds: Murmur heard.      Comments: VAD hum, elevated JVD  Pulmonary:      Effort: Pulmonary effort is normal.      Breath sounds: Normal breath sounds.   Abdominal:      General: Bowel sounds are normal. There is distension.      Palpations: Abdomen is soft.   Musculoskeletal:         General: Normal range of motion.      Cervical back: Normal range of motion and neck supple.      Right lower leg: No edema.      Left lower leg: No edema.   Skin:     General: Skin is warm and dry.   Neurological:      Mental Status: He is alert and oriented to person, place, and time.   Psychiatric:         Behavior: Behavior normal.            Significant Labs:  CBC:  Recent Labs   Lab 01/22/25  0503 01/23/25  0308 01/24/25  1025   WBC 9.23 11.37 7.62   RBC 5.25 4.89 4.82   HGB 9.3* 8.7* 8.8*   HCT 32.6* 30.2* 30.2*    423 324   MCV 62* 62* 63*   MCH 17.7* 17.8* 18.3*   MCHC 28.5* 28.8* 29.1*     BNP:  Recent Labs   Lab 01/20/25  0527 01/22/25  0503 01/24/25  1048   * 463* 221*     CMP:  Recent Labs   Lab 01/20/25  0527 01/20/25  0955 01/22/25  0503 01/22/25  1413 01/23/25  0308 01/23/25  1746 01/24/25  1250   *   < >  166*   < > 133* 69* 167*   CALCIUM 9.1   < > 9.5   < > 9.1 9.0 8.9   ALBUMIN 2.8*  --  3.0*  --   --   --  2.8*   PROT 9.9*  --  10.1*  --   --   --  9.5*   *   < > 130*   < > 130* 130* 131*   K 2.7*   < > 2.9*   < > 3.3* 3.2* 4.0   CO2 30*   < > 31*   < > 30* 29 29   CL 86*   < > 87*   < > 87* 89* 92*   BUN 31*   < > 35*   < > 39* 37* 30*   CREATININE 1.5*   < > 1.7*   < > 1.7* 1.3 1.3   ALKPHOS 197*  --  206*  --   --   --  209*   ALT 6*  --  5*  --   --   --  10   AST 27  --  30  --   --   --  43*   BILITOT 1.4*  --  1.2*  --   --   --  1.1*    < > = values in this interval not displayed.      Coagulation:   Recent Labs   Lab 01/22/25  0503 01/23/25  0308 01/24/25  1250   INR 2.1* 2.1* 1.9*   APTT 33.0* 33.0* 30.6     LDH:  Recent Labs   Lab 01/22/25  0503 01/23/25  0308 01/24/25  1250    235 695*     Microbiology:  Microbiology Results (last 7 days)       Procedure Component Value Units Date/Time    Fungus culture [[id number]] Collected: 01/18/25 1454    Order Status: Completed Specimen: Ascites Updated: 01/24/25 1103     Fungus (Mycology) Culture Culture in progress    Blood culture [[id number]] Collected: 01/19/25 0318    Order Status: Completed Specimen: Blood Updated: 01/24/25 0812     Blood Culture, Routine No growth after 5 days.    Blood culture [[id number]] Collected: 01/19/25 0318    Order Status: Completed Specimen: Blood Updated: 01/24/25 0812     Blood Culture, Routine No growth after 5 days.    (rule out SBP) Aerobic culture [[id number]] Collected: 01/18/25 1454    Order Status: Completed Specimen: Ascites Updated: 01/22/25 0959     Aerobic Bacterial Culture No growth    Narrative:      To rule out SBP order labs: Aerobic culture [[id number]],  Culture, Anaerobic [QKF232], Gram stain [JFR488], Albumin  [FJL708], Protein [HAP152], LDH [OYR843], WBC \T\ Dff  [FCX5410].    Blood culture [[id number]] Collected: 01/16/25 1006    Order Status: Completed Specimen: Blood Updated: 01/21/25 1212      Blood Culture, Routine No growth after 5 days.    Blood culture [[id number]] Collected: 01/16/25 1006    Order Status: Completed Specimen: Blood Updated: 01/21/25 1212     Blood Culture, Routine No growth after 5 days.    (rule out SBP) Culture, Anaerobic [[id number]] Collected: 01/18/25 1454    Order Status: Completed Specimen: Ascites Updated: 01/20/25 0651     Anaerobic Culture Culture in progress    Narrative:      To rule out SBP order labs: Aerobic culture [[id number]],  Culture, Anaerobic [RER302], Gram stain [HLK616], Albumin  [LGW163], Protein [WOJ427], LDH [IXK723], WBC \T\ Dff  [VDZ5918].    (rule out SBP) Gram stain [[id number]] Collected: 01/18/25 1454    Order Status: Completed Specimen: Ascites Updated: 01/18/25 2124     Gram Stain Result No WBC's      No organisms seen    Narrative:      To rule out SBP order labs: Aerobic culture [VHD235],  Culture, Anaerobic [XNR076], Gram stain [DIR520], Albumin  [XXC003], Protein [RNW841], LDH [YWV216], WBC \T\ Dff  [HLG7532].    Gram stain [[id number]]     Order Status: Canceled Specimen: Body Fluid     Anaerobic culture [[id number]]     Order Status: Canceled Specimen: Body Fluid     Aerobic culture [[id number]]     Order Status: Canceled Specimen: Peritoneal Fluid             I have reviewed all pertinent labs within the past 24 hours.    Estimated Creatinine Clearance: 65.3 mL/min (based on SCr of 1.3 mg/dL).    Diagnostic Results:  I have reviewed all pertinent imaging results/findings within the past 24 hours.

## 2025-01-24 NOTE — PROGRESS NOTES
[facility] - Cardiac Intensive Care  Heart Transplant  Progress Note    Patient Name: Kevan Queen  MRN: [id number]  Admission Date: 1/15/2025  Hospital Length of Stay: 9 days  Attending Physician: Marlo Marques MD  Primary Care Provider: Rosio Armendariz FNP  Principal Problem:MSSA bacteremia    Subjective:   Interval History: Patient with no new complaints today.  Plan is to discharge on Cefazolin until 2/26 once line is placed.  Line team consulted for midline placement; cleared with infusion company.  Hoping for discharge tomorrow     Paracentesis without signs of SBP on labs, cultures pending.    Scheduled Meds:   amiodarone  200 mg Oral Daily    amLODIPine  10 mg Oral Daily    artificial tears  1 drop Both Eyes QID WAKE    aspirin  81 mg Oral Daily    atorvastatin  40 mg Oral Daily    ceFAZolin (Ancef) 6 g in D5W 500 mL CONTINUOUS INFUSION  6 g Intravenous Q24H    DULoxetine  30 mg Oral Daily    empagliflozin  10 mg Oral Daily    eplerenone  50 mg Oral Daily    gabapentin  100 mg Oral BID    And    gabapentin  400 mg Oral QHS    insulin aspart U-100  8 Units Subcutaneous TIDWM    insulin glargine U-100  10 Units Subcutaneous Daily    levETIRAcetam  1,500 mg Oral BID    magnesium oxide  800 mg Oral BID    methocarbamoL  500 mg Oral QID    pantoprazole  40 mg Oral Daily    polyethylene glycol  17 g Oral Daily    torsemide  60 mg Oral BID loop    warfarin  1.5 mg Oral Daily     PRN Meds:  Current Facility-Administered Medications:     acetaminophen, 650 mg, Oral, Q6H PRN    dextrose 50%, 12.5 g, Intravenous, PRN    dextrose 50%, 25 g, Intravenous, PRN    glucagon (human recombinant), 1 mg, Intramuscular, PRN    glucose, 16 g, Oral, PRN    glucose, 24 g, Oral, PRN    insulin aspart U-100, 0-5 Units, Subcutaneous, QID (AC + HS) PRN    melatonin, 6 mg, Oral, Nightly PRN    ondansetron, 4 mg, Oral, Q8H PRN    senna-docusate 8.6-50 mg, 1 tablet, Oral, Daily PRN    Review of patient's allergies indicates:    Allergen Reactions    Bumex [bumetanide] Hives     Objective:     Vital Signs (Most Recent):  Temp: 98.4 °F (36.9 °C) (01/24/25 1230)  Pulse: 84 (01/24/25 1434)  Resp: 18 (01/24/25 1230)  BP: (!) 78/0 (01/24/25 1230)  SpO2: 99 % (01/24/25 1230) Vital Signs (24h Range):  Temp:  [97.7 °F (36.5 °C)-98.4 °F (36.9 °C)] 98.4 °F (36.9 °C)  Pulse:  [78-86] 84  Resp:  [16-19] 18  SpO2:  [96 %-100 %] 99 %  BP: (78-88)/(0) 78/0     Patient Vitals for the past 72 hrs (Last 3 readings):   Weight   01/24/25 0509 60.5 kg (133 lb 6.1 oz)   01/23/25 0445 60.5 kg (133 lb 6.1 oz)   01/22/25 0415 59.1 kg (130 lb 4.7 oz)     Body mass index is 16.67 kg/m².      Intake/Output Summary (Last 24 hours) at 1/24/2025 1438  Last data filed at 1/24/2025 1251  Gross per 24 hour   Intake 1826 ml   Output 4225 ml   Net -2399 ml       Telemetry: NSR 90s       Physical Exam  Vitals and nursing note reviewed.   Constitutional:       Appearance: He is well-developed.   HENT:      Head: Normocephalic.   Eyes:      Pupils: Pupils are equal, round, and reactive to light.   Cardiovascular:      Rate and Rhythm: Normal rate and regular rhythm.      Heart sounds: Murmur heard.      Comments: VAD hum, elevated JVD  Pulmonary:      Effort: Pulmonary effort is normal.      Breath sounds: Normal breath sounds.   Abdominal:      General: Bowel sounds are normal. There is distension.      Palpations: Abdomen is soft.   Musculoskeletal:         General: Normal range of motion.      Cervical back: Normal range of motion and neck supple.      Right lower leg: No edema.      Left lower leg: No edema.   Skin:     General: Skin is warm and dry.   Neurological:      Mental Status: He is alert and oriented to person, place, and time.   Psychiatric:         Behavior: Behavior normal.            Significant Labs:  CBC:  Recent Labs   Lab 01/22/25  0503 01/23/25  0308 01/24/25  1025   WBC 9.23 11.37 7.62   RBC 5.25 4.89 4.82   HGB 9.3* 8.7* 8.8*   HCT 32.6* 30.2* 30.2*     423 324   MCV 62* 62* 63*   MCH 17.7* 17.8* 18.3*   MCHC 28.5* 28.8* 29.1*     BNP:  Recent Labs   Lab 01/20/25  0527 01/22/25  0503 01/24/25  1048   * 463* 221*     CMP:  Recent Labs   Lab 01/20/25  0527 01/20/25  0955 01/22/25  0503 01/22/25  1413 01/23/25  0308 01/23/25  1746 01/24/25  1250   *   < > 166*   < > 133* 69* 167*   CALCIUM 9.1   < > 9.5   < > 9.1 9.0 8.9   ALBUMIN 2.8*  --  3.0*  --   --   --  2.8*   PROT 9.9*  --  10.1*  --   --   --  9.5*   *   < > 130*   < > 130* 130* 131*   K 2.7*   < > 2.9*   < > 3.3* 3.2* 4.0   CO2 30*   < > 31*   < > 30* 29 29   CL 86*   < > 87*   < > 87* 89* 92*   BUN 31*   < > 35*   < > 39* 37* 30*   CREATININE 1.5*   < > 1.7*   < > 1.7* 1.3 1.3   ALKPHOS 197*  --  206*  --   --   --  209*   ALT 6*  --  5*  --   --   --  10   AST 27  --  30  --   --   --  43*   BILITOT 1.4*  --  1.2*  --   --   --  1.1*    < > = values in this interval not displayed.      Coagulation:   Recent Labs   Lab 01/22/25  0503 01/23/25  0308 01/24/25  1250   INR 2.1* 2.1* 1.9*   APTT 33.0* 33.0* 30.6     LDH:  Recent Labs   Lab 01/22/25  0503 01/23/25  0308 01/24/25  1250    235 695*     Microbiology:  Microbiology Results (last 7 days)       Procedure Component Value Units Date/Time    Fungus culture [[id number]] Collected: 01/18/25 8984    Order Status: Completed Specimen: Ascites Updated: 01/24/25 1103     Fungus (Mycology) Culture Culture in progress    Blood culture [[id number]] Collected: 01/19/25 0318    Order Status: Completed Specimen: Blood Updated: 01/24/25 0812     Blood Culture, Routine No growth after 5 days.    Blood culture [[id number]] Collected: 01/19/25 0318    Order Status: Completed Specimen: Blood Updated: 01/24/25 0812     Blood Culture, Routine No growth after 5 days.    (rule out SBP) Aerobic culture [[id number]] Collected: 01/18/25 1454    Order Status: Completed Specimen: Ascites Updated: 01/22/25 0959     Aerobic Bacterial Culture No  growth    Narrative:      To rule out SBP order labs: Aerobic culture [[id number]],  Culture, Anaerobic [[id number]], Gram stain [XYW264], Albumin  [SLX123], Protein [MYT191], LDH [IAD296], WBC \T\ Dff  [FJA6933].    Blood culture [[id number]] Collected: 01/16/25 1006    Order Status: Completed Specimen: Blood Updated: 01/21/25 1212     Blood Culture, Routine No growth after 5 days.    Blood culture [[id number]] Collected: 01/16/25 1006    Order Status: Completed Specimen: Blood Updated: 01/21/25 1212     Blood Culture, Routine No growth after 5 days.    (rule out SBP) Culture, Anaerobic [[id number]] Collected: 01/18/25 1454    Order Status: Completed Specimen: Ascites Updated: 01/20/25 0651     Anaerobic Culture Culture in progress    Narrative:      To rule out SBP order labs: Aerobic culture [[id number]],  Culture, Anaerobic [HIO593], Gram stain [HSO688], Albumin  [XWF999], Protein [VOK114], LDH [EEM741], WBC \T\ Dff  [PVP4447].    (rule out SBP) Gram stain [[id number]] Collected: 01/18/25 1454    Order Status: Completed Specimen: Ascites Updated: 01/18/25 2124     Gram Stain Result No WBC's      No organisms seen    Narrative:      To rule out SBP order labs: Aerobic culture [[id number]],  Culture, Anaerobic [[id number]], Gram stain [XKX446], Albumin  [SZH975], Protein [SJS637], LDH [MJR972], WBC \T\ Dff  [WRV6218].    Gram stain [[id number]]     Order Status: Canceled Specimen: Body Fluid     Anaerobic culture [[id number]]     Order Status: Canceled Specimen: Body Fluid     Aerobic culture [[id number]]     Order Status: Canceled Specimen: Peritoneal Fluid             I have reviewed all pertinent labs within the past 24 hours.    Estimated Creatinine Clearance: 65.3 mL/min (based on SCr of 1.3 mg/dL).    Diagnostic Results:  I have reviewed all pertinent imaging results/findings within the past 24 hours.  Assessment and Plan:     No notes on file    * MSSA bacteremia  -Blood Cx 1/14 growing staph.   -Repeat Blood Cx 1/16 negative  thus far  -Started on Vanc/Zosyn.  -ID consult.   -Vanc and Zosyn changed to Ancef  -TTE and ophthalmology consult ordered per ID recommendations. No vegetation on TTE and no infectious etiology of blurred vision per ophtho   -diagnostic paracentesis -negative so far, but cultures pending.   -Midline consult placed  -Planning for Cefazolin until 2/26/25    LVAD (left ventricular assist device) present  HeartMate 3 Implanted 6/23/2022 with early RV failure requiring RVAD with ProTek Duo   -Continue Coumadin,  Goal INR 2.0-3.0 . Mildly subtherapeutic today.  Previous home regimen 1.5mg Qdaily  -Antiplatelets ASA 81 mg, on hold 2/2 GIB history  -LDH is stable overall today. Will continue to monitor daily.  -Speed set at 5100  -Interrogation notable for PI events with 1 LFA on 1/20/25  -Not listed for OHTx    Procedure: Device Interrogation Including analysis of device parameters  Current Settings: Ventricular Assist Device        1/24/2025    12:37 PM 1/24/2025     7:57 AM 1/24/2025     5:38 AM 1/24/2025    12:08 AM 1/23/2025     8:21 PM 1/23/2025     4:17 PM 1/23/2025    12:06 PM   TXP LVAD INTERROGATIONS   Type HeartMate3 HeartMate3 HeartMate3 HeartMate3 HeartMate3 HeartMate3 HeartMate3   Flow 4.1 4.1 42 3.9 4 3.9 4.2   Speed 5100 5100 5100 5100 5100 5100 5100   PI 5 5.5 5.3 6.4 5.9 6.1 4.9   Power (Serna) 3.6 3.6 3.5 3.6 3.6 3.7 3.6   LSL 4700 4700 4700 4700 4700     Pulsatility Intermittent pulse  Intermittent pulse Intermittent pulse Intermittent pulse Intermittent pulse Intermittent pulse         Acute on chronic systolic CHF (congestive heart failure)  -NICM  -Last 2D Echo 1/19/25 with EF 10 -15%. G1 DD. RV systolic function severely reduced. Mod to severe TR. PASP  31 mmHg. IVC/SVC: 15 mmHg.  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)  -Hx of chronic DLES on Cefadroxil for suppression  -cont current reg of cefazolin IV, need abx x 6wk  -HH orders placed    HTN (hypertension)  -On home dose of Amlodipine and Eplerenone    Temporal lobe seizure  -On home dose of Keppra     Chronic anticoagulation  -See above VAD    Right heart failure secondary to left heart failure-post LVAD surgery  -Weaned off inotropes 8/2024  -See above heart failure     Ascites  -Will monitor with diuresis   - Labs negative for SBP - follow cultures - NGTD    Hypokalemia  Replace and monitor per ptDEYA Egan  Heart Transplant  [facility] - Cardiac [facility]

## 2025-01-24 NOTE — PLAN OF CARE
Pt free of falls and injury. Pt AAOx4. Fall precautions remain in place. Sternal precautions maintained. Pt remains on room air. Sats %. NSR on telemetry with LVAD artifact. LVAD hum present and smooth. VAD numbers and dopplers WDL. DLES dressing CDI. Plan of care reviewed with pt. Cefazolin gtt continued as ordered. Intake and output monitored. Daily weight obtained. Glucose monitored. Pt resting comfortably with no complaints of pain. Pt denies chest pain, headache, and SOB. No acute distress noted,  plan of care continues.      Problem: Adult Inpatient Plan of Care  Goal: Plan of Care Review  Outcome: Progressing  Goal: Patient-Specific Goal (Individualized)  Outcome: Progressing  Goal: Absence of Hospital-Acquired Illness or Injury  Outcome: Progressing  Goal: Optimal Comfort and Wellbeing  Outcome: Progressing  Goal: Readiness for Transition of Care  Outcome: Progressing     Problem: Diabetes Comorbidity  Goal: Blood Glucose Level Within Targeted Range  Outcome: Progressing     Problem: Sepsis/Septic Shock  Goal: Optimal Coping  Outcome: Progressing  Goal: Absence of Bleeding  Outcome: Progressing  Goal: Blood Glucose Level Within Targeted Range  Outcome: Progressing  Goal: Absence of Infection Signs and Symptoms  Outcome: Progressing  Goal: Optimal Nutrition Intake  Outcome: Progressing     Problem: Ventricular Assist Device  Goal: Optimal Adjustment to Device  Outcome: Progressing  Goal: Absence of Bleeding  Outcome: Progressing  Goal: Absence of Embolism Signs and Symptoms  Outcome: Progressing  Goal: Optimal Blood Flow  Outcome: Progressing  Goal: Absence of Infection Signs and Symptoms  Outcome: Progressing  Goal: Effective Right-Sided Heart Function  Outcome: Progressing     Problem: Fall Injury Risk  Goal: Absence of Fall and Fall-Related Injury  Outcome: Progressing     Problem: Heart Failure  Goal: Optimal Coping  Outcome: Progressing  Goal: Optimal Cardiac Output  Outcome: Progressing  Goal:  Stable Heart Rate and Rhythm  Outcome: Progressing  Goal: Optimal Functional Ability  Outcome: Progressing  Goal: Fluid and Electrolyte Balance  Outcome: Progressing  Goal: Improved Oral Intake  Outcome: Progressing  Goal: Effective Oxygenation and Ventilation  Outcome: Progressing  Goal: Effective Breathing Pattern During Sleep  Outcome: Progressing     Problem: Wound  Goal: Optimal Coping  Outcome: Progressing  Goal: Optimal Functional Ability  Outcome: Progressing  Goal: Absence of Infection Signs and Symptoms  Outcome: Progressing  Goal: Improved Oral Intake  Outcome: Progressing  Goal: Optimal Pain Control and Function  Outcome: Progressing  Goal: Skin Health and Integrity  Outcome: Progressing  Goal: Optimal Wound Healing  Outcome: Progressing

## 2025-01-24 NOTE — PROGRESS NOTES
Discharge    Pt presents in room aaox4 with pleasant affect. Caregivers/family not present at time of visit. Pt voices agreement with plan to discharge to home on IV abx pending PICC line placement. Pt states he is able to go weekly to infusion office for line care and labs. Pt requests transportation assistance home. Pt reports coping well and denies further needs, questions, concerns at this time and none indicated. Providing psychosocial and counseling support, education, resources, assistance and discharge planning as indicated. SW remains available.    [facility] ph [phone], fax [phone]    Medicaid Transportation ph [phone] or [facility] transportation.
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
## 2025-01-24 NOTE — PROGRESS NOTES
01/24/2025  John Alaniz    Current provider:  Marlo Marques MD    Device interrogation:      1/24/2025     7:57 AM 1/24/2025     5:38 AM 1/24/2025    12:08 AM 1/23/2025     8:21 PM 1/23/2025     4:17 PM 1/23/2025    12:06 PM 1/23/2025     9:00 AM   TXP LVAD INTERROGATIONS   Type HeartMate3 HeartMate3 HeartMate3 HeartMate3 HeartMate3 HeartMate3 HeartMate3   Flow 4.1 42 3.9 4 3.9 4.2 4.1   Speed 5100 5100 5100 5100 5100 5100 5100   PI 5.5 5.3 6.4 5.9 6.1 4.9 5.6   Power (Serna) 3.6 3.5 3.6 3.6 3.7 3.6 3.7   LSL 4700 4700 4700 4700   4700   Pulsatility  Intermittent pulse Intermittent pulse Intermittent pulse Intermittent pulse Intermittent pulse Intermittent pulse          Rounded on Kevan Queen to ensure all mechanical assist device settings (IABP or VAD) were appropriate and all parameters were within limits.  I was able to ensure all back up equipment was present, the staff had no issues, and the Perfusion Department daily rounding was complete.      For implantable VADs: Interrogation of Ventricular assist device was performed with analysis of device parameters and review of device function. I have personally reviewed the interrogation findings and agree with findings as stated.     In emergency, the nursing units have been notified to contact the perfusion department either by:  Calling [phone] from 630am to 4pm Mon thru Fri, utilizing the On-Call Finder functionality of Epic and searching for Perfusion, or by contacting the hospital  from 4pm to 630am and on weekends and asking to speak with the perfusionist on call.    9:17 AM

## 2025-01-25 VITALS
OXYGEN SATURATION: 99 % | SYSTOLIC BLOOD PRESSURE: 80 MMHG | WEIGHT: 134.5 LBS | TEMPERATURE: 99 F | HEIGHT: 75 IN | BODY MASS INDEX: 16.72 KG/M2 | HEART RATE: 71 BPM | RESPIRATION RATE: 20 BRPM

## 2025-01-25 LAB
ANION GAP SERPL CALC-SCNC: 13 MMOL/L (ref 8–16)
APTT PPP: 30.8 SEC (ref 21–32)
BASOPHILS # BLD AUTO: 0.04 K/UL (ref 0–0.2)
BASOPHILS NFR BLD: 0.4 % (ref 0–1.9)
BUN SERPL-MCNC: 29 MG/DL (ref 6–20)
CALCIUM SERPL-MCNC: 9.1 MG/DL (ref 8.7–10.5)
CHLORIDE SERPL-SCNC: 88 MMOL/L (ref 95–110)
CO2 SERPL-SCNC: 33 MMOL/L (ref 23–29)
CREAT SERPL-MCNC: 1.3 MG/DL (ref 0.5–1.4)
DIFFERENTIAL METHOD BLD: ABNORMAL
EOSINOPHIL # BLD AUTO: 0 K/UL (ref 0–0.5)
EOSINOPHIL NFR BLD: 0.3 % (ref 0–8)
ERYTHROCYTE [DISTWIDTH] IN BLOOD BY AUTOMATED COUNT: 23.6 % (ref 11.5–14.5)
EST. GFR  (NO RACE VARIABLE): >60 ML/MIN/1.73 M^2
FINAL PATHOLOGIC DIAGNOSIS: NORMAL
GLUCOSE SERPL-MCNC: 136 MG/DL (ref 70–110)
HCT VFR BLD AUTO: 29.1 % (ref 40–54)
HGB BLD-MCNC: 8.4 G/DL (ref 14–18)
IMM GRANULOCYTES # BLD AUTO: 0.02 K/UL (ref 0–0.04)
IMM GRANULOCYTES NFR BLD AUTO: 0.2 % (ref 0–0.5)
INR PPP: 1.7 (ref 0.8–1.2)
LDH SERPL L TO P-CCNC: 237 U/L (ref 110–260)
LYMPHOCYTES # BLD AUTO: 1.2 K/UL (ref 1–4.8)
LYMPHOCYTES NFR BLD: 12.4 % (ref 18–48)
Lab: NORMAL
MAGNESIUM SERPL-MCNC: 2.1 MG/DL (ref 1.6–2.6)
MCH RBC QN AUTO: 18.2 PG (ref 27–31)
MCHC RBC AUTO-ENTMCNC: 28.9 G/DL (ref 32–36)
MCV RBC AUTO: 63 FL (ref 82–98)
MONOCYTES # BLD AUTO: 0.7 K/UL (ref 0.3–1)
MONOCYTES NFR BLD: 7.5 % (ref 4–15)
NEUTROPHILS # BLD AUTO: 7.4 K/UL (ref 1.8–7.7)
NEUTROPHILS NFR BLD: 79.2 % (ref 38–73)
NRBC BLD-RTO: 0 /100 WBC
PHOSPHATE SERPL-MCNC: 3.4 MG/DL (ref 2.7–4.5)
PLATELET # BLD AUTO: 463 K/UL (ref 150–450)
PMV BLD AUTO: 9.5 FL (ref 9.2–12.9)
POCT GLUCOSE: 156 MG/DL (ref 70–110)
POCT GLUCOSE: 161 MG/DL (ref 70–110)
POCT GLUCOSE: 228 MG/DL (ref 70–110)
POTASSIUM SERPL-SCNC: 2.7 MMOL/L (ref 3.5–5.1)
PROTHROMBIN TIME: 18.4 SEC (ref 9–12.5)
RBC # BLD AUTO: 4.61 M/UL (ref 4.6–6.2)
SODIUM SERPL-SCNC: 134 MMOL/L (ref 136–145)
WBC # BLD AUTO: 9.38 K/UL (ref 3.9–12.7)

## 2025-01-25 PROCEDURE — 83735 ASSAY OF MAGNESIUM: CPT | Performed by: INTERNAL MEDICINE

## 2025-01-25 PROCEDURE — 84100 ASSAY OF PHOSPHORUS: CPT | Performed by: INTERNAL MEDICINE

## 2025-01-25 PROCEDURE — 25000003 PHARM REV CODE 250: Performed by: NURSE PRACTITIONER

## 2025-01-25 PROCEDURE — 99232 SBSQ HOSP IP/OBS MODERATE 35: CPT | Mod: ,,, | Performed by: PHYSICIAN ASSISTANT

## 2025-01-25 PROCEDURE — 93750 INTERROGATION VAD IN PERSON: CPT | Mod: ,,, | Performed by: INTERNAL MEDICINE

## 2025-01-25 PROCEDURE — 80048 BASIC METABOLIC PNL TOTAL CA: CPT | Performed by: INTERNAL MEDICINE

## 2025-01-25 PROCEDURE — 27000248 HC VAD-ADDITIONAL DAY

## 2025-01-25 PROCEDURE — 83615 LACTATE (LD) (LDH) ENZYME: CPT | Performed by: INTERNAL MEDICINE

## 2025-01-25 PROCEDURE — 25000003 PHARM REV CODE 250: Performed by: STUDENT IN AN ORGANIZED HEALTH CARE EDUCATION/TRAINING PROGRAM

## 2025-01-25 PROCEDURE — 85025 COMPLETE CBC W/AUTO DIFF WBC: CPT | Performed by: INTERNAL MEDICINE

## 2025-01-25 PROCEDURE — 85730 THROMBOPLASTIN TIME PARTIAL: CPT | Performed by: INTERNAL MEDICINE

## 2025-01-25 PROCEDURE — 25000003 PHARM REV CODE 250

## 2025-01-25 PROCEDURE — 94761 N-INVAS EAR/PLS OXIMETRY MLT: CPT

## 2025-01-25 PROCEDURE — 25000003 PHARM REV CODE 250: Performed by: PHYSICIAN ASSISTANT

## 2025-01-25 PROCEDURE — 85610 PROTHROMBIN TIME: CPT | Performed by: INTERNAL MEDICINE

## 2025-01-25 RX ORDER — POTASSIUM CHLORIDE 20 MEQ/1
60 TABLET, EXTENDED RELEASE ORAL EVERY 4 HOURS
Status: COMPLETED | OUTPATIENT
Start: 2025-01-25 | End: 2025-01-25

## 2025-01-25 RX ADMIN — HYPROMELLOSE 2910 1 DROP: 5 SOLUTION/ DROPS OPHTHALMIC at 09:01

## 2025-01-25 RX ADMIN — INSULIN ASPART 8 UNITS: 100 INJECTION, SOLUTION INTRAVENOUS; SUBCUTANEOUS at 05:01

## 2025-01-25 RX ADMIN — EPLERENONE 50 MG: 25 TABLET, FILM COATED ORAL at 09:01

## 2025-01-25 RX ADMIN — INSULIN ASPART 8 UNITS: 100 INJECTION, SOLUTION INTRAVENOUS; SUBCUTANEOUS at 01:01

## 2025-01-25 RX ADMIN — LEVETIRACETAM 1500 MG: 500 TABLET, FILM COATED ORAL at 09:01

## 2025-01-25 RX ADMIN — METHOCARBAMOL 500 MG: 500 TABLET ORAL at 09:01

## 2025-01-25 RX ADMIN — AMLODIPINE BESYLATE 10 MG: 10 TABLET ORAL at 09:01

## 2025-01-25 RX ADMIN — POTASSIUM CHLORIDE 60 MEQ: 1500 TABLET, EXTENDED RELEASE ORAL at 09:01

## 2025-01-25 RX ADMIN — INSULIN ASPART 2 UNITS: 100 INJECTION, SOLUTION INTRAVENOUS; SUBCUTANEOUS at 01:01

## 2025-01-25 RX ADMIN — DULOXETINE HYDROCHLORIDE 30 MG: 30 CAPSULE, DELAYED RELEASE ORAL at 09:01

## 2025-01-25 RX ADMIN — METHOCARBAMOL 500 MG: 500 TABLET ORAL at 05:01

## 2025-01-25 RX ADMIN — EMPAGLIFLOZIN 10 MG: 10 TABLET, FILM COATED ORAL at 10:01

## 2025-01-25 RX ADMIN — Medication 800 MG: at 09:01

## 2025-01-25 RX ADMIN — TORSEMIDE 60 MG: 20 TABLET ORAL at 09:01

## 2025-01-25 RX ADMIN — POTASSIUM CHLORIDE 60 MEQ: 1500 TABLET, EXTENDED RELEASE ORAL at 11:01

## 2025-01-25 RX ADMIN — ASPIRIN 81 MG: 81 TABLET, COATED ORAL at 09:01

## 2025-01-25 RX ADMIN — METHOCARBAMOL 500 MG: 500 TABLET ORAL at 01:01

## 2025-01-25 RX ADMIN — TORSEMIDE 60 MG: 20 TABLET ORAL at 05:01

## 2025-01-25 RX ADMIN — PANTOPRAZOLE SODIUM 40 MG: 40 TABLET, DELAYED RELEASE ORAL at 09:01

## 2025-01-25 RX ADMIN — INSULIN GLARGINE 10 UNITS: 100 INJECTION, SOLUTION SUBCUTANEOUS at 09:01

## 2025-01-25 RX ADMIN — GABAPENTIN 100 MG: 100 CAPSULE ORAL at 09:01

## 2025-01-25 RX ADMIN — WARFARIN SODIUM 1.5 MG: 1 TABLET ORAL at 05:01

## 2025-01-25 RX ADMIN — ATORVASTATIN CALCIUM 40 MG: 40 TABLET, FILM COATED ORAL at 09:01

## 2025-01-25 RX ADMIN — AMIODARONE HYDROCHLORIDE 200 MG: 200 TABLET ORAL at 09:01

## 2025-01-25 RX ADMIN — INSULIN ASPART 8 UNITS: 100 INJECTION, SOLUTION INTRAVENOUS; SUBCUTANEOUS at 09:01

## 2025-01-25 NOTE — PROGRESS NOTES
01/25/2025  Mirela Perez    Current provider:  Marlo Marques MD    Device interrogation:      1/25/2025    12:00 AM 1/24/2025     8:25 PM 1/24/2025     3:33 PM 1/24/2025    12:37 PM 1/24/2025     7:57 AM 1/24/2025     5:38 AM 1/24/2025    12:08 AM   TXP LVAD INTERROGATIONS   Type HeartMate3 HeartMate3 HeartMate3 HeartMate3 HeartMate3 HeartMate3 HeartMate3   Flow 4 4.1 3.9 4.1 4.1 42 3.9   Speed 5100 5100 5100 5100 5100 5100 5100   PI 6.5 3.4 6.4 5 5.5 5.3 6.4   Power (Serna) 3.6 3.6 3.6 3.6 3.6 3.5 3.6   LSL 4700 4700 4700 4700 4700 4700 4700   Pulsatility Intermittent pulse Intermittent pulse Intermittent pulse Intermittent pulse  Intermittent pulse Intermittent pulse          Rounded on Kevan Queen to ensure all mechanical assist device settings (IABP or VAD) were appropriate and all parameters were within limits.  I was able to ensure all back up equipment was present, the staff had no issues, and the Perfusion Department daily rounding was complete.      For implantable VADs: Interrogation of Ventricular assist device was performed with analysis of device parameters and review of device function. I have personally reviewed the interrogation findings and agree with findings as stated.     In emergency, the nursing units have been notified to contact the perfusion department either by:  Calling [phone] from 630am to 4pm Mon thru Fri, utilizing the On-Call Finder functionality of Epic and searching for Perfusion, or by contacting the hospital  from 4pm to 630am and on weekends and asking to speak with the perfusionist on call.    4:05 AM

## 2025-01-25 NOTE — ASSESSMENT & PLAN NOTE
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 Mildly subtherapeutic today.  Previous home regimen 1.5mg Qdaily  -Antiplatelets ASA 81 mg, on hold 2/2 GIB history  -LDH is stable overall today. Will continue to monitor daily.  -Speed set at 5100  -Interrogation notable for PI events with 1 LFA on 1/20/25  -Not listed for OHTx    Procedure: Device Interrogation Including analysis of device parameters  Current Settings: Ventricular Assist Device        1/25/2025     7:20 AM 1/25/2025     4:30 AM 1/25/2025    12:00 AM 1/24/2025     8:25 PM 1/24/2025     3:33 PM 1/24/2025    12:37 PM 1/24/2025     7:57 AM   TXP LVAD INTERROGATIONS   Type HeartMate3 HeartMate3 HeartMate3 HeartMate3 HeartMate3 HeartMate3 HeartMate3   Flow 4 4.3 4 4.1 3.9 4.1 4.1   Speed 5100 5100 5100 5100 5100 5100 5100   PI 6 4.3 6.5 3.4 6.4 5 5.5   Power (Serna) 3.7 3.7 3.6 3.6 3.6 3.6 3.6   LSL 4700 4700 4700 4700 4700 4700 4700   Pulsatility Intermittent pulse Intermittent pulse Intermittent pulse Intermittent pulse Intermittent pulse Intermittent pulse

## 2025-01-25 NOTE — NURSING
RN called [facility] transportation [phone] for update on transport. Per , the  will be here after dropping another pt off at a different hospital. Pt notified of status.

## 2025-01-25 NOTE — ASSESSMENT & PLAN NOTE
-Blood Cx 1/14 growing staph.   -Repeat Blood Cx 1/16 negative thus far  -Started on Vanc/Zosyn.  -ID consult.   -Vanc and Zosyn changed to Ancef  -TTE and ophthalmology consult ordered per ID recommendations. No vegetation on TTE and no infectious etiology of blurred vision per ophtho   -diagnostic paracentesis -negative so far, but cultures pending.   -Midline placed 1/24  -Planning for Cefazolin until 2/26/25. Home health and infusion company have been arranged

## 2025-01-25 NOTE — PROGRESS NOTES
Discharge Note:  Pt to be dc today.  Communicated with pt's nurse.    TREY called [employer] to notify of dc today, and they will deliver meds to pt's home today. [phone]  TREY called [facility] [phone] and requested  for noon today.  [id number] (Trip number).    No other needs reported at this time.    Addendum 11:00a  TREY received call from [facility] with [employer]. Pt to receive pre-filled syringes to admin meds. [employer] advised that pt has been educated on the admin of the med.  Updated nursing.

## 2025-01-25 NOTE — SUBJECTIVE & OBJECTIVE
Interval History: Patient with no new complaints today.  Midline placed yesterday and patient is ready to go home.  Plan is to discharge on Cefazolin until 2/26.       Scheduled Meds:   amiodarone  200 mg Oral Daily    amLODIPine  10 mg Oral Daily    artificial tears  1 drop Both Eyes QID WAKE    aspirin  81 mg Oral Daily    atorvastatin  40 mg Oral Daily    ceFAZolin (Ancef) 6 g in D5W 500 mL CONTINUOUS INFUSION  6 g Intravenous Q24H    DULoxetine  30 mg Oral Daily    empagliflozin  10 mg Oral Daily    eplerenone  50 mg Oral Daily    gabapentin  100 mg Oral BID    And    gabapentin  400 mg Oral QHS    insulin aspart U-100  8 Units Subcutaneous TIDWM    insulin glargine U-100  10 Units Subcutaneous Daily    levETIRAcetam  1,500 mg Oral BID    magnesium oxide  800 mg Oral BID    methocarbamoL  500 mg Oral QID    pantoprazole  40 mg Oral Daily    polyethylene glycol  17 g Oral Daily    potassium chloride  60 mEq Oral Q4H    torsemide  60 mg Oral BID loop    warfarin  1.5 mg Oral Daily     PRN Meds:  Current Facility-Administered Medications:     acetaminophen, 650 mg, Oral, Q6H PRN    dextrose 50%, 12.5 g, Intravenous, PRN    dextrose 50%, 25 g, Intravenous, PRN    glucagon (human recombinant), 1 mg, Intramuscular, PRN    glucose, 16 g, Oral, PRN    glucose, 24 g, Oral, PRN    insulin aspart U-100, 0-5 Units, Subcutaneous, QID (AC + HS) PRN    melatonin, 6 mg, Oral, Nightly PRN    ondansetron, 4 mg, Oral, Q8H PRN    senna-docusate 8.6-50 mg, 1 tablet, Oral, Daily PRN    Review of patient's allergies indicates:   Allergen Reactions    Bumex [bumetanide] Hives     Objective:     Vital Signs (Most Recent):  Temp: 97.7 °F (36.5 °C) (01/25/25 0808)  Pulse: 80 (01/25/25 0600)  Resp: 16 (01/25/25 0808)  BP: (!) 84/0 (01/25/25 0430)  SpO2: (!) 94 % (01/25/25 0430) Vital Signs (24h Range):  Temp:  [97.7 °F (36.5 °C)-98.4 °F (36.9 °C)] 97.7 °F (36.5 °C)  Pulse:  [69-97] 80  Resp:  [13-18] 16  SpO2:  [92 %-99 %] 94 %  BP:  (78-84)/(0) 84/0     Patient Vitals for the past 72 hrs (Last 3 readings):   Weight   01/25/25 0530 61 kg (134 lb 7.7 oz)   01/24/25 0509 60.5 kg (133 lb 6.1 oz)   01/23/25 0445 60.5 kg (133 lb 6.1 oz)     Body mass index is 16.81 kg/m².      Intake/Output Summary (Last 24 hours) at 1/25/2025 0852  Last data filed at 1/25/2025 0719  Gross per 24 hour   Intake 2354.11 ml   Output 3525 ml   Net -1170.89 ml       Telemetry: NSR 90s       Physical Exam  Vitals and nursing note reviewed.   Constitutional:       Appearance: He is well-developed.   HENT:      Head: Normocephalic.   Eyes:      Pupils: Pupils are equal, round, and reactive to light.   Cardiovascular:      Rate and Rhythm: Normal rate and regular rhythm.      Heart sounds: Murmur heard.      Comments: VAD hum, elevated JVD  Pulmonary:      Effort: Pulmonary effort is normal.      Breath sounds: Normal breath sounds.   Abdominal:      General: Bowel sounds are normal. There is distension.      Palpations: Abdomen is soft.   Musculoskeletal:         General: Normal range of motion.      Cervical back: Normal range of motion and neck supple.      Right lower leg: No edema.      Left lower leg: No edema.   Skin:     General: Skin is warm and dry.   Neurological:      Mental Status: He is alert and oriented to person, place, and time.   Psychiatric:         Behavior: Behavior normal.            Significant Labs:  CBC:  Recent Labs   Lab 01/23/25  0308 01/24/25  1025 01/25/25  0438   WBC 11.37 7.62 9.38   RBC 4.89 4.82 4.61   HGB 8.7* 8.8* 8.4*   HCT 30.2* 30.2* 29.1*    324 463*   MCV 62* 63* 63*   MCH 17.8* 18.3* 18.2*   MCHC 28.8* 29.1* 28.9*     BNP:  Recent Labs   Lab 01/20/25  0527 01/22/25  0503 01/24/25  1048   * 463* 221*     CMP:  Recent Labs   Lab 01/20/25  0527 01/20/25  0955 01/22/25  0503 01/22/25  1413 01/23/25  1746 01/24/25  1250 01/25/25  0438   *   < > 166*   < > 69* 167* 136*   CALCIUM 9.1   < > 9.5   < > 9.0 8.9 9.1    ALBUMIN 2.8*  --  3.0*  --   --  2.8*  --    PROT 9.9*  --  10.1*  --   --  9.5*  --    *   < > 130*   < > 130* 131* 134*   K 2.7*   < > 2.9*   < > 3.2* 4.0 2.7*   CO2 30*   < > 31*   < > 29 29 33*   CL 86*   < > 87*   < > 89* 92* 88*   BUN 31*   < > 35*   < > 37* 30* 29*   CREATININE 1.5*   < > 1.7*   < > 1.3 1.3 1.3   ALKPHOS 197*  --  206*  --   --  209*  --    ALT 6*  --  5*  --   --  10  --    AST 27  --  30  --   --  43*  --    BILITOT 1.4*  --  1.2*  --   --  1.1*  --     < > = values in this interval not displayed.      Coagulation:   Recent Labs   Lab 01/23/25  0308 01/24/25  1250 01/25/25  0438   INR 2.1* 1.9* 1.7*   APTT 33.0* 30.6 30.8     LDH:  Recent Labs   Lab 01/23/25  0308 01/24/25  1250 01/25/25  0438    695* 237     Microbiology:  Microbiology Results (last 7 days)       Procedure Component Value Units Date/Time    (rule out SBP) Culture, Anaerobic [[id number]] Collected: 01/18/25 1454    Order Status: Completed Specimen: Ascites Updated: 01/24/25 1509     Anaerobic Culture Culture in progress    Narrative:      To rule out SBP order labs: Aerobic culture [PLM672],  Culture, Anaerobic [HGR861], Gram stain [ADX445], Albumin  [GTY410], Protein [AKL847], LDH [ZRB372], WBC \T\ Dff  [UBE7899].    Fungus culture [[id number]] Collected: 01/18/25 1454    Order Status: Completed Specimen: Ascites Updated: 01/24/25 1103     Fungus (Mycology) Culture Culture in progress    Blood culture [[id number]] Collected: 01/19/25 0318    Order Status: Completed Specimen: Blood Updated: 01/24/25 0812     Blood Culture, Routine No growth after 5 days.    Blood culture [[id number]] Collected: 01/19/25 0318    Order Status: Completed Specimen: Blood Updated: 01/24/25 0812     Blood Culture, Routine No growth after 5 days.    (rule out SBP) Aerobic culture [[id number]] Collected: 01/18/25 1454    Order Status: Completed Specimen: Ascites Updated: 01/22/25 0959     Aerobic Bacterial Culture No growth     Narrative:      To rule out SBP order labs: Aerobic culture [HCE770],  Culture, Anaerobic [MPI404], Gram stain [ACV333], Albumin  [KDV197], Protein [ZHY691], LDH [GRF978], WBC \T\ Dff  [NBV8922].    Blood culture [[id number]] Collected: 01/16/25 1006    Order Status: Completed Specimen: Blood Updated: 01/21/25 1212     Blood Culture, Routine No growth after 5 days.    Blood culture [[id number]] Collected: 01/16/25 1006    Order Status: Completed Specimen: Blood Updated: 01/21/25 1212     Blood Culture, Routine No growth after 5 days.    (rule out SBP) Gram stain [[id number]] Collected: 01/18/25 1454    Order Status: Completed Specimen: Ascites Updated: 01/18/25 2124     Gram Stain Result No WBC's      No organisms seen    Narrative:      To rule out SBP order labs: Aerobic culture [QCL222],  Culture, Anaerobic [NBE159], Gram stain [IXI665], Albumin  [TTJ187], Protein [ZXA854], LDH [UUT640], WBC \T\ Dff  [NBL2879].    Gram stain [[id number]]     Order Status: Canceled Specimen: Body Fluid     Anaerobic culture [[id number]]     Order Status: Canceled Specimen: Body Fluid     Aerobic culture [[id number]]     Order Status: Canceled Specimen: Peritoneal Fluid             I have reviewed all pertinent labs within the past 24 hours.    Estimated Creatinine Clearance: 65.8 mL/min (based on SCr of 1.3 mg/dL).    Diagnostic Results:  I have reviewed all pertinent imaging results/findings within the past 24 hours.

## 2025-01-25 NOTE — PLAN OF CARE
Pt free of falls and injury. Pt denies any pain or discomfort. Pt AAOx4. Fall precautions remain in place. LVAD hum present and smooth. VAD numbers and dopplers WDL. DLES dressing CDI. Plan of care reviewed with pt. Call light and belongings within reach, pt with no complaints at this time.    Problem: Adult Inpatient Plan of Care  Goal: Plan of Care Review  Outcome: Progressing  Goal: Patient-Specific Goal (Individualized)  Outcome: Progressing     Problem: Diabetes Comorbidity  Goal: Blood Glucose Level Within Targeted Range  Outcome: Progressing     Problem: Sepsis/Septic Shock  Goal: Optimal Coping  Outcome: Progressing  Goal: Absence of Bleeding  Outcome: Progressing     Problem: Ventricular Assist Device  Goal: Optimal Adjustment to Device  Outcome: Progressing  Goal: Absence of Bleeding  Outcome: Progressing     Problem: Fall Injury Risk  Goal: Absence of Fall and Fall-Related Injury  Outcome: Progressing     Problem: Heart Failure  Goal: Optimal Coping  Outcome: Progressing  Goal: Optimal Cardiac Output  Outcome: Progressing     Problem: Wound  Goal: Optimal Coping  Outcome: Progressing  Goal: Optimal Functional Ability  Outcome: Progressing     Problem: Infection  Goal: Absence of Infection Signs and Symptoms  Outcome: Progressing

## 2025-01-25 NOTE — PROGRESS NOTES
Pt with critical lab value this AM of potassium 2.7. MD Aguilar notified and stated he will order replacement for patient.

## 2025-01-25 NOTE — DISCHARGE INSTRUCTIONS
To give IV antibiotics:  Scrub the end of the midline with alcohol swab, unclamp, flush, give IV antibiotics, flush, clamp.    If IV antibiotics comes in 2 gram syringes, give every 8 hours.

## 2025-01-25 NOTE — NURSING
"LVAD dressing change completed using sterile technique with daily kit. DLES is a "2" with small brown/sanguineous drainage noted on the drain sponge. Tolerated without any complication. No redness, or tenderness noted.   "

## 2025-01-25 NOTE — DISCHARGE SUMMARY
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 He underwent RVAD removal and chest closure 6/30/2022. He is off inotropes. He presents as a transfer from OSH for sepsis.     He has difficulty providing history, but states he was in his usual state of health this evening before developing nausea/abdominal pain after eating a pizza. He does not remember much further after this as he states he was altered after this time. Denies RAMIREZ, le edema or any HF symptoms. Denies LVAD alarms.  He was recently discharged 7D ago from this facility after being admitted for HF. States he has been compliant with medications since discharge.      At OSH ED, he was found to have elevated WBC count with BNP 1k. Lactate was originally elevated at 3 before normalizing. CT c/a/p demonstrated ascites and pleural effusions, but no obvious consolidation. UA was negative for nitrite or leukocytes. He was given vanc/zosyn, and then transferred to this facility for sepsis.       * No surgery found *     Hospital Course: III 6/29/22 5100 admitted with ADHF/bacteremia. Diuresed on lasix gtt with good response and he was transitioned to po diuretics  Torsemide 60mg BID.  Throughout his hospital stay; patient is net negative 26L.  Weight on day of discharge was 134lbs down from 163lbs on admit.    Throughout his hospital stay; he was found to have MSSA  bacteremia.  ID consulted and plan is for Cefazolin until 2/26.  Midline placed for home abx as PICC team could not place PICC and abx duration appropriate for midline vs tunneled line. Ophtho- consulted for blurry visit and no concern for infectious cause. Transthoracic Echo without vegetation.     Patient discharged to home with home health.  Infusion company has been arranged as well as Medicaid transportation     Goals of Care Treatment Preferences:  Code Status: Full Code    Health care agent: Niharika Wright  [facility] care agent number: [phone]          What is most important right now is to focus on symptom/pain control, extending life as long as possible, even it it means sacrificing quality, curative/life-prolongation (regardless of treatment burdens).  Accordingly, we have decided that the best plan to meet the patient's goals includes continuing with treatment.      Consults (From admission, onward)          Status Ordering Provider     Inpatient consult to Midline team  Once        Provider:  (Not yet assigned)    Completed DANIEL BAIG     Inpatient consult to PICC team (NIAS)  Once        Provider:  (Not yet assigned)    Completed LAY ESQUIVEL     ENROLL PATIENT IN OPAT  Once        Provider:  (Not yet assigned)    Acknowledged DONNA WRIGHT     Inpatient consult to Endocrinology  Once        Provider:  (Not yet assigned)    Completed FRANKY PORTILLO     Inpatient consult to Ophthalmology  Once        Provider:  (Not yet assigned)    Completed LAY ESQUIVEL     Inpatient consult to Infectious Diseases  Once        Provider:  (Not yet assigned)    Completed SHRUTHI VILLALBA            Significant Diagnostic Studies: see chart for full details    Pending Diagnostic Studies:       None          Final Active Diagnoses:    Diagnosis Date Noted POA    PRINCIPAL PROBLEM:  MSSA bacteremia [R78.81, B95.61] 07/23/2023 Yes    LVAD (left ventricular assist device) present [Z95.811] 06/30/2022  Not Applicable    Type 2 diabetes mellitus with hyperglycemia [E11.65] 05/25/2022 Yes    Acute on chronic systolic CHF (congestive heart failure) [I50.23] 10/25/2020 Yes    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) [T82.7XXA] 07/23/2023 Yes    HTN (hypertension) [I10] 04/21/2024 Yes    Temporal lobe seizure [G40.109] 07/20/2022 Yes    Chronic anticoagulation [Z79.01] 01/03/2024 Not Applicable    Right heart failure secondary to left heart failure-post LVAD surgery [I50.814] 09/11/2022 Yes    Ascites [R18.8] 01/15/2025 Yes    Hypokalemia [E87.6] 05/21/2024 Yes      Problems Resolved During this Admission:      Discharged Condition: stable    Disposition: Home or Self Care    Follow Up:    Patient Instructions:      Diet Cardiac     Notify your health care provider if you experience any of the following:  redness, tenderness, or signs of infection (pain, swelling, redness, odor or green/yellow discharge around incision site)     Notify your health care provider if you experience any of the following:  worsening rash     Activity as tolerated     Medications:  Reconciled Home Medications:      Medication List        START taking these medications      D5W SolP 500 mL with ceFAZolin 1 gram SolR 6 g  Inject 6 g into the vein Daily.            CONTINUE taking these medications      acetaminophen 325 MG tablet  Commonly known as: TYLENOL  Take 2 tablets (650 mg total) by mouth every 6 (six) hours as needed for Pain.     amiodarone 200 MG Tab  Commonly known as: PACERONE  Take 1 tablet (200 mg total) by mouth once daily.     amLODIPine 10 MG tablet  Commonly known as: NORVASC  Take 1 tablet (10 mg total) by mouth once daily.     aspirin 81 MG EC tablet  Commonly known as: ECOTRIN  Take 1 tablet (81 mg total) by mouth once daily.     atorvastatin 40 MG tablet  Commonly known as: LIPITOR  Take 1 tablet (40 mg total) by mouth once daily.     BAQSIMI 3 mg/actuation Spry  Generic drug:  "glucagon  1 each by Nasal route as needed (hypoglycemia).     BD ULTRA-FINE GARTH PEN NEEDLE 32 gauge x 5/32" Ndle  Generic drug: pen needle, diabetic  1 each by Misc.(Non-Drug; Combo Route) route 4 (four) times daily.     blood sugar diagnostic Strp  Use to test blood glucose 4 (four) times daily.     blood-glucose meter Misc  Commonly known as: TRUE METRIX GLUCOSE METER  Use to test blood glucose 4 (four) times daily.     DULoxetine 30 MG capsule  Commonly known as: CYMBALTA  Take 1 capsule (30 mg total) by mouth once daily.     empagliflozin 10 mg tablet  Commonly known as: Jardiance  Take 1 tablet (10 mg total) by mouth once daily.     eplerenone 25 MG Tab  Commonly known as: INSPRA  Take 2 tablets (50 mg total) by mouth once daily.     FREESTYLE LUCIUS 3 SENSOR Dee  Generic drug: blood-glucose sensor  1 Device by Misc.(Non-Drug; Combo Route) route every 14 (fourteen) days.     gabapentin 100 MG capsule  Commonly known as: NEURONTIN  Take 1 capsule (100 mg total) by mouth 2 (two) times daily AND 4 capsules (400 mg total) every evening.     insulin aspart U-100 100 unit/mL (3 mL) Inpn pen  Commonly known as: NovoLOG  Inject 18 Units into the skin 3 (three) times daily with meals: MAX 94 units/daily  150-200    201-250    251-300    301-350    >350  +2 units   +4 units    +6 units    +8 units    +10 units     insulin detemir U-100 (Levemir) 100 unit/mL (3 mL) Inpn pen  Inject 10 Units into the skin 2 (two) times daily. In the morning and before bed.     lancets 33 gauge Misc  Use to test blood glucose 4 (four) times daily.     levETIRAcetam 1000 MG tablet  Commonly known as: KEPPRA  Take 1.5 tablets (1,500 mg total) by mouth 2 (two) times daily.     magnesium oxide 400 mg (241.3 mg magnesium) tablet  Commonly known as: MAG-OX  Take 1 tablet (400 mg total) by mouth once daily.     ondansetron 4 MG Tbdl  Commonly known as: ZOFRAN-ODT  Take 1 tablet (4 mg total) by mouth every 8 (eight) hours as needed (nausea).   "   pantoprazole 40 MG tablet  Commonly known as: PROTONIX  Take 1 tablet (40 mg total) by mouth once daily.     polyethylene glycol 17 gram Pwpk  Commonly known as: GLYCOLAX  Take 17 g by mouth once daily.     potassium chloride SA 10 MEQ tablet  Commonly known as: K-DUR,KLOR-CON M  Take 3 tablets (30 mEq total) by mouth once daily.     senna-docusate 8.6-50 mg 8.6-50 mg per tablet  Commonly known as: PERICOLACE  Take 1 tablet by mouth daily as needed for Constipation.     torsemide 20 MG Tab  Commonly known as: DEMADEX  Take 3 tablets (60 mg total) by mouth 2 (two) times a day.     warfarin 3 MG tablet  Commonly known as: COUMADIN  Take 0.5 tablets (1.5 mg total) by mouth Daily.            STOP taking these medications      cefadroxil 500 MG Cap  Commonly known as: DEYA Velasquez  Heart Transplant  [facility] - Cardiac Int[facility]

## 2025-01-25 NOTE — PLAN OF CARE
Plan of care discussed with patient.  Patient ambulating independently, fall precautions in place. LVAD DP and numbers WNL, smooth LVAD hum. Patient has no complaints of pain. Ancef gtt infusing per orders. Pt got midline during this shift, dressing CDI. Discussed medications and care. Patient has no questions at this time.        Problem: Adult Inpatient Plan of Care  Goal: Plan of Care Review  Outcome: Progressing  Flowsheets (Taken 1/24/2025 1758)  Plan of Care Reviewed With: patient  Goal: Optimal Comfort and Wellbeing  Outcome: Progressing  Intervention: Monitor Pain and Promote Comfort  Flowsheets (Taken 1/24/2025 1758)  Pain Management Interventions: relaxation techniques promoted  Intervention: Provide Person-Centered Care  Flowsheets (Taken 1/24/2025 1758)  Trust Relationship/Rapport:   care explained   choices provided   questions encouraged   questions answered     Problem: Diabetes Comorbidity  Goal: Blood Glucose Level Within Targeted Range  Outcome: Progressing  Intervention: Monitor and Manage Glycemia  Flowsheets (Taken 1/24/2025 1758)  Glycemic Management:   blood glucose monitored   supplemental insulin given     Problem: Sepsis/Septic Shock  Goal: Absence of Bleeding  Outcome: Progressing  Intervention: Monitor and Manage Bleeding  Flowsheets (Taken 1/24/2025 1758)  Bleeding Precautions:   blood pressure closely monitored   coagulation study results reviewed  Bleeding Management: dressing monitored     Problem: Ventricular Assist Device  Goal: Absence of Embolism Signs and Symptoms  Outcome: Progressing  Intervention: Prevent or Manage Embolism  Flowsheets (Taken 1/24/2025 1758)  VTE Prevention/Management:   ambulation promoted   bleeding precautions maintained   bleeding risk assessed   dorsiflexion/plantar flexion performed   ROM (active) performed     Problem: Fall Injury Risk  Goal: Absence of Fall and Fall-Related Injury  Outcome: Progressing  Intervention: Identify and Manage  Contributors  Flowsheets (Taken 1/24/2025 1758)  Self-Care Promotion:   independence encouraged   BADL personal objects within reach   adaptive equipment use encouraged  Medication Review/Management: medications reviewed  Intervention: Promote Injury-Free Environment  Flowsheets (Taken 1/24/2025 1758)  Safety Promotion/Fall Prevention:   assistive device/personal item within reach   commode/urinal/bedpan at bedside   Fall Risk reviewed with patient/family   Fall Risk signage in place   instructed to call staff for mobility   medications reviewed   patient expresses understanding of fall risk and prevention   nonskid shoes/socks when out of bed   room near unit station   side rails raised x 2

## 2025-01-25 NOTE — SUBJECTIVE & OBJECTIVE
"Interval HPI:   No acute events overnight. Patient in room [id number]/EVBO1652 A. Blood glucose stable. BG at and above goal on current insulin regimen (SSI, prandial, and basal insulin ). Steroid use- None .   Renal function- Normal   Vasopressors-  None      Diet Cardiac Low Sodium,2gm, Double Portions; Fluid - 2000mL      Eatin%  Nausea: No  Hypoglycemia and intervention: No  Fever: No  TPN and/or TF: No    BP (!) 84/0 (BP Location: Left arm, Patient Position: Lying)   Pulse 80   Temp 97.7 °F (36.5 °C) (Oral)   Resp 16   Ht 6' 3" (1.905 m)   Wt 61 kg (134 lb 7.7 oz)   SpO2 (!) 94%   BMI 16.81 kg/m²     Labs Reviewed and Include    Recent Labs   Lab 25  1250 25  0438   * 136*   CALCIUM 8.9 9.1   ALBUMIN 2.8*  --    PROT 9.5*  --    * 134*   K 4.0 2.7*   CO2 29 33*   CL 92* 88*   BUN 30* 29*   CREATININE 1.3 1.3   ALKPHOS 209*  --    ALT 10  --    AST 43*  --    BILITOT 1.1*  --      Lab Results   Component Value Date    WBC 9.38 2025    HGB 8.4 (L) 2025    HCT 29.1 (L) 2025    MCV 63 (L) 2025     (H) 2025     No results for input(s): "TSH", "FREET4" in the last 168 hours.  Lab Results   Component Value Date    HGBA1C 10.6 (H) 2024       Nutritional status:   Body mass index is 16.81 kg/m².  Lab Results   Component Value Date    ALBUMIN 2.8 (L) 2025    ALBUMIN 3.0 (L) 2025    ALBUMIN 2.8 (L) 2025     Lab Results   Component Value Date    PREALBUMIN 20 2025    PREALBUMIN 18 (L) 2025    PREALBUMIN 15 (L) 2025       Estimated Creatinine Clearance: 65.8 mL/min (based on SCr of 1.3 mg/dL).    Accu-Checks  Recent Labs     25  1625 25  2003 25  0931 25  1340 25  1612 25  1950 25  0900 25  1239 25  1747 25  0624   POCTGLUCOSE 176* 189* 181* 301* 112* 103 194* 189* 133* 156*       Current Medications and/or Treatments Impacting Glycemic " Control  Immunotherapy:    Immunosuppressants       None          Steroids:   Hormones (From admission, onward)      Start     Stop Route Frequency Ordered    01/20/25 0147  melatonin tablet 6 mg         -- Oral Nightly PRN 01/20/25 0048          Pressors:    Autonomic Drugs (From admission, onward)      None          Hyperglycemia/Diabetes Medications:   Antihyperglycemics (From admission, onward)      Start     Stop Route Frequency Ordered    01/22/25 0730  insulin aspart U-100 pen 8 Units         -- SubQ 3 times daily with meals 01/22/25 0721    01/19/25 1206  insulin aspart U-100 pen 0-5 Units         -- SubQ Before meals & nightly PRN 01/19/25 1106    01/15/25 0900  empagliflozin (Jardiance) tablet 10 mg        Question Answer Comment   Does this patient have a diagnosis of heart failure? Yes    Does this patient have type 1 diabetes or diabetic ketoacidosis? No    Does this patient have symptomatic hypotension? No    Is the patient NPO or pending major surgery in next 3 days or less? No        -- Oral Daily 01/15/25 0312    01/15/25 0900  insulin glargine U-100 (Lantus) pen 10 Units         -- SubQ Daily 01/15/25 0312

## 2025-01-25 NOTE — PROGRESS NOTES
"[facility]  Endocrinology  Progress Note    Admit Date: 1/15/2025     Reason for Consult: Management of T2DM, Hyperglycemia      Surgical Procedure and Date: LVAD 2022      Diabetes diagnosis year:      Home Diabetes Medications:    - Lantus 20 units daily  - Novolog SSI with meals   - Novolog SSI  150 - 200 + 2 unit    201 - 250 + 4 units    251 - 300 + 6 units    301 - 350 + 8 units       > 350   + 10 units  - Jardiance 10 mg       How often checking glucose at home - 3x daily   BG readings on regimen: " pt does not recall"  Hypoglycemia on the regimen? No  Missed doses on regimen?  No     Diabetes Complications include:   Hyperglycemia     Complicating diabetes co morbidities:   History of MI, CHF, and CKD        HPI:   Patient is a 39 y.o. 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 He underwent RVAD removal and chest closure 2022. He is off inotropes. He presents as a transfer from OSH for sepsis.  Endocrine consulted for bg management.    Interval HPI:   No acute events overnight. Patient in room [id number] A. Blood glucose stable. BG at and above goal on current insulin regimen (SSI, prandial, and basal insulin ). Steroid use- None .   Renal function- Normal   Vasopressors-  None      Diet Cardiac Low Sodium,2gm, Double Portions; Fluid - 2000mL      Eatin%  Nausea: No  Hypoglycemia and intervention: No  Fever: No  TPN and/or TF: No    BP (!) 84/0 (BP Location: Left arm, Patient Position: Lying)   Pulse 80   Temp 97.7 °F (36.5 °C) (Oral)   Resp 16   Ht 6' 3" (1.905 m)   Wt 61 kg (134 lb 7.7 oz)   SpO2 (!) 94%   BMI 16.81 kg/m²     Labs Reviewed and Include    Recent Labs   Lab 25  1250 25  0438   * 136*   CALCIUM 8.9 9.1   ALBUMIN 2.8*  --  " "  PROT 9.5*  --    * 134*   K 4.0 2.7*   CO2 29 33*   CL 92* 88*   BUN 30* 29*   CREATININE 1.3 1.3   ALKPHOS 209*  --    ALT 10  --    AST 43*  --    BILITOT 1.1*  --      Lab Results   Component Value Date    WBC 9.38 01/25/2025    HGB 8.4 (L) 01/25/2025    HCT 29.1 (L) 01/25/2025    MCV 63 (L) 01/25/2025     (H) 01/25/2025     No results for input(s): "TSH", "FREET4" in the last 168 hours.  Lab Results   Component Value Date    HGBA1C 10.6 (H) 12/31/2024       Nutritional status:   Body mass index is 16.81 kg/m².  Lab Results   Component Value Date    ALBUMIN 2.8 (L) 01/24/2025    ALBUMIN 3.0 (L) 01/22/2025    ALBUMIN 2.8 (L) 01/20/2025     Lab Results   Component Value Date    PREALBUMIN 20 01/24/2025    PREALBUMIN 18 (L) 01/22/2025    PREALBUMIN 15 (L) 01/20/2025       Estimated Creatinine Clearance: 65.8 mL/min (based on SCr of 1.3 mg/dL).    Accu-Checks  Recent Labs     01/22/25  1625 01/22/25  2003 01/23/25  0931 01/23/25  1340 01/23/25  1612 01/23/25  1950 01/24/25  0900 01/24/25  1239 01/24/25  1747 01/25/25  0624   POCTGLUCOSE 176* 189* 181* 301* 112* 103 194* 189* 133* 156*       Current Medications and/or Treatments Impacting Glycemic Control  Immunotherapy:    Immunosuppressants       None          Steroids:   Hormones (From admission, onward)      Start     Stop Route Frequency Ordered    01/20/25 0147  melatonin tablet 6 mg         -- Oral Nightly PRN 01/20/25 0048          Pressors:    Autonomic Drugs (From admission, onward)      None          Hyperglycemia/Diabetes Medications:   Antihyperglycemics (From admission, onward)      Start     Stop Route Frequency Ordered    01/22/25 0730  insulin aspart U-100 pen 8 Units         -- SubQ 3 times daily with meals 01/22/25 0721    01/19/25 1206  insulin aspart U-100 pen 0-5 Units         -- SubQ Before meals & nightly PRN 01/19/25 1106    01/15/25 0900  empagliflozin (Jardiance) tablet 10 mg        Question Answer Comment   Does this patient " have a diagnosis of heart failure? Yes    Does this patient have type 1 diabetes or diabetic ketoacidosis? No    Does this patient have symptomatic hypotension? No    Is the patient NPO or pending major surgery in next 3 days or less? No        -- Oral Daily 01/15/25 0312    01/15/25 0900  insulin glargine U-100 (Lantus) pen 10 Units         -- SubQ Daily 01/15/25 0312            ASSESSMENT and PLAN    Cardiac/Vascular  LVAD (left ventricular assist device) present  Managed per primary team  Optimize bg control        Acute on chronic systolic CHF (congestive heart failure)  Managed per primary team  Optimize bg control      Endocrine  Type 2 diabetes mellitus with hyperglycemia  BG goal: 140-180  T2DM. Bacteremic.  BG mostly stable.    - Jardiance 10 mg daily (home medication; started per primary team)  -Continue Lantus 10 units once daily  -Continue Novolog 8 units TID with meals  -Continue Low Dose Correction Scale   - POCT Glucose before meals and at bedtime  - Hypoglycemia protocol in place      ** Please notify Endocrine for any change and/or advance in diet**  ** Please call Endocrine for any BG related issues **     Discharge Planning:   TBD. Please notify endocrinology prior to discharge.            Wilmer Quesada PA-C  Endocrinology  [facility]

## 2025-01-25 NOTE — PROGRESS NOTES
[facility]  Heart Transplant  Progress Note    Patient Name: Kevan Queen  MRN: [id number]  Admission Date: 1/15/2025  Hospital Length of Stay: 10 days  Attending Physician: Marlo Marques MD  Primary Care Provider: Rosio Armendariz FNP  Principal Problem:MSSA bacteremia    Subjective:   Interval History: Patient with no new complaints today.  Midline placed yesterday and patient is ready to go home.  Plan is to discharge on Cefazolin until 2/26.       Scheduled Meds:   amiodarone  200 mg Oral Daily    amLODIPine  10 mg Oral Daily    artificial tears  1 drop Both Eyes QID WAKE    aspirin  81 mg Oral Daily    atorvastatin  40 mg Oral Daily    ceFAZolin (Ancef) 6 g in D5W 500 mL CONTINUOUS INFUSION  6 g Intravenous Q24H    DULoxetine  30 mg Oral Daily    empagliflozin  10 mg Oral Daily    eplerenone  50 mg Oral Daily    gabapentin  100 mg Oral BID    And    gabapentin  400 mg Oral QHS    insulin aspart U-100  8 Units Subcutaneous TIDWM    insulin glargine U-100  10 Units Subcutaneous Daily    levETIRAcetam  1,500 mg Oral BID    magnesium oxide  800 mg Oral BID    methocarbamoL  500 mg Oral QID    pantoprazole  40 mg Oral Daily    polyethylene glycol  17 g Oral Daily    potassium chloride  60 mEq Oral Q4H    torsemide  60 mg Oral BID loop    warfarin  1.5 mg Oral Daily     PRN Meds:  Current Facility-Administered Medications:     acetaminophen, 650 mg, Oral, Q6H PRN    dextrose 50%, 12.5 g, Intravenous, PRN    dextrose 50%, 25 g, Intravenous, PRN    glucagon (human recombinant), 1 mg, Intramuscular, PRN    glucose, 16 g, Oral, PRN    glucose, 24 g, Oral, PRN    insulin aspart U-100, 0-5 Units, Subcutaneous, QID (AC + HS) PRN    melatonin, 6 mg, Oral, Nightly PRN    ondansetron, 4 mg, Oral, Q8H PRN    senna-docusate 8.6-50 mg, 1 tablet, Oral, Daily PRN    Review of patient's allergies indicates:   Allergen Reactions    Bumex [bumetanide] Hives     Objective:     Vital Signs (Most  Recent):  Temp: 97.7 °F (36.5 °C) (01/25/25 0808)  Pulse: 80 (01/25/25 0600)  Resp: 16 (01/25/25 0808)  BP: (!) 84/0 (01/25/25 0430)  SpO2: (!) 94 % (01/25/25 0430) Vital Signs (24h Range):  Temp:  [97.7 °F (36.5 °C)-98.4 °F (36.9 °C)] 97.7 °F (36.5 °C)  Pulse:  [69-97] 80  Resp:  [13-18] 16  SpO2:  [92 %-99 %] 94 %  BP: (78-84)/(0) 84/0     Patient Vitals for the past 72 hrs (Last 3 readings):   Weight   01/25/25 0530 61 kg (134 lb 7.7 oz)   01/24/25 0509 60.5 kg (133 lb 6.1 oz)   01/23/25 0445 60.5 kg (133 lb 6.1 oz)     Body mass index is 16.81 kg/m².      Intake/Output Summary (Last 24 hours) at 1/25/2025 0852  Last data filed at 1/25/2025 0719  Gross per 24 hour   Intake 2354.11 ml   Output 3525 ml   Net -1170.89 ml       Telemetry: NSR 90s       Physical Exam  Vitals and nursing note reviewed.   Constitutional:       Appearance: He is well-developed.   HENT:      Head: Normocephalic.   Eyes:      Pupils: Pupils are equal, round, and reactive to light.   Cardiovascular:      Rate and Rhythm: Normal rate and regular rhythm.      Heart sounds: Murmur heard.      Comments: VAD hum, elevated JVD  Pulmonary:      Effort: Pulmonary effort is normal.      Breath sounds: Normal breath sounds.   Abdominal:      General: Bowel sounds are normal. There is distension.      Palpations: Abdomen is soft.   Musculoskeletal:         General: Normal range of motion.      Cervical back: Normal range of motion and neck supple.      Right lower leg: No edema.      Left lower leg: No edema.   Skin:     General: Skin is warm and dry.   Neurological:      Mental Status: He is alert and oriented to person, place, and time.   Psychiatric:         Behavior: Behavior normal.            Significant Labs:  CBC:  Recent Labs   Lab 01/23/25  0308 01/24/25  1025 01/25/25  0438   WBC 11.37 7.62 9.38   RBC 4.89 4.82 4.61   HGB 8.7* 8.8* 8.4*   HCT 30.2* 30.2* 29.1*    324 463*   MCV 62* 63* 63*   MCH 17.8* 18.3* 18.2*   MCHC 28.8* 29.1*  28.9*     BNP:  Recent Labs   Lab 01/20/25  0527 01/22/25  0503 01/24/25  1048   * 463* 221*     CMP:  Recent Labs   Lab 01/20/25  0527 01/20/25  0955 01/22/25  0503 01/22/25  1413 01/23/25  1746 01/24/25  1250 01/25/25  0438   *   < > 166*   < > 69* 167* 136*   CALCIUM 9.1   < > 9.5   < > 9.0 8.9 9.1   ALBUMIN 2.8*  --  3.0*  --   --  2.8*  --    PROT 9.9*  --  10.1*  --   --  9.5*  --    *   < > 130*   < > 130* 131* 134*   K 2.7*   < > 2.9*   < > 3.2* 4.0 2.7*   CO2 30*   < > 31*   < > 29 29 33*   CL 86*   < > 87*   < > 89* 92* 88*   BUN 31*   < > 35*   < > 37* 30* 29*   CREATININE 1.5*   < > 1.7*   < > 1.3 1.3 1.3   ALKPHOS 197*  --  206*  --   --  209*  --    ALT 6*  --  5*  --   --  10  --    AST 27  --  30  --   --  43*  --    BILITOT 1.4*  --  1.2*  --   --  1.1*  --     < > = values in this interval not displayed.      Coagulation:   Recent Labs   Lab 01/23/25  0308 01/24/25  1250 01/25/25  0438   INR 2.1* 1.9* 1.7*   APTT 33.0* 30.6 30.8     LDH:  Recent Labs   Lab 01/23/25  0308 01/24/25  1250 01/25/25  0438    695* 237     Microbiology:  Microbiology Results (last 7 days)       Procedure Component Value Units Date/Time    (rule out SBP) Culture, Anaerobic [[id number]] Collected: 01/18/25 1454    Order Status: Completed Specimen: Ascites Updated: 01/24/25 1509     Anaerobic Culture Culture in progress    Narrative:      To rule out SBP order labs: Aerobic culture [IOK840],  Culture, Anaerobic [IZK989], Gram stain [RHX651], Albumin  [QWY608], Protein [RXR564], LDH [PUY593], WBC \T\ Dff  [WER1220].    Fungus culture [[id number]] Collected: 01/18/25 1454    Order Status: Completed Specimen: Ascites Updated: 01/24/25 1103     Fungus (Mycology) Culture Culture in progress    Blood culture [[id number]] Collected: 01/19/25 0318    Order Status: Completed Specimen: Blood Updated: 01/24/25 0812     Blood Culture, Routine No growth after 5 days.    Blood culture [[id number]] Collected:  01/19/25 0318    Order Status: Completed Specimen: Blood Updated: 01/24/25 0812     Blood Culture, Routine No growth after 5 days.    (rule out SBP) Aerobic culture [[id number]] Collected: 01/18/25 1454    Order Status: Completed Specimen: Ascites Updated: 01/22/25 0959     Aerobic Bacterial Culture No growth    Narrative:      To rule out SBP order labs: Aerobic culture [PYV983],  Culture, Anaerobic [VGG611], Gram stain [CHS425], Albumin  [ZOF694], Protein [ZHC977], LDH [ZTJ907], WBC \T\ Dff  [QWF8947].    Blood culture [[id number]] Collected: 01/16/25 1006    Order Status: Completed Specimen: Blood Updated: 01/21/25 1212     Blood Culture, Routine No growth after 5 days.    Blood culture [[id number]] Collected: 01/16/25 1006    Order Status: Completed Specimen: Blood Updated: 01/21/25 1212     Blood Culture, Routine No growth after 5 days.    (rule out SBP) Gram stain [[id number]] Collected: 01/18/25 1454    Order Status: Completed Specimen: Ascites Updated: 01/18/25 2124     Gram Stain Result No WBC's      No organisms seen    Narrative:      To rule out SBP order labs: Aerobic culture [HGH780],  Culture, Anaerobic [GQN751], Gram stain [IXD110], Albumin  [LNP058], Protein [EAJ801], LDH [PSI163], WBC \T\ Dff  [DTL7242].    Gram stain [[id number]]     Order Status: Canceled Specimen: Body Fluid     Anaerobic culture [[id number]]     Order Status: Canceled Specimen: Body Fluid     Aerobic culture [[id number]]     Order Status: Canceled Specimen: Peritoneal Fluid             I have reviewed all pertinent labs within the past 24 hours.    Estimated Creatinine Clearance: 65.8 mL/min (based on SCr of 1.3 mg/dL).    Diagnostic Results:  I have reviewed all pertinent imaging results/findings within the past 24 hours.  Assessment and Plan:     No notes on file    * MSSA bacteremia  -Blood Cx 1/14 growing staph.   -Repeat Blood Cx 1/16 negative thus far  -Started on Vanc/Zosyn.  -ID consult.   -Vanc and Zosyn changed to  Ancef  -TTE and ophthalmology consult ordered per ID recommendations. No vegetation on TTE and no infectious etiology of blurred vision per ophtho   -diagnostic paracentesis -negative so far, but cultures pending.   -Midline placed 1/24  -Planning for Cefazolin until 2/26/25. Home health and infusion company have been arranged     LVAD (left ventricular assist device) present  HeartMate 3 Implanted 6/23/2022 with early RV failure requiring RVAD with ProTek Duo   -Continue Coumadin,  Goal INR 2.0-3.0 . Mildly subtherapeutic today.  Previous home regimen 1.5mg Qdaily  -Antiplatelets ASA 81 mg, on hold 2/2 GIB history  -LDH is stable overall today. Will continue to monitor daily.  -Speed set at 5100  -Interrogation notable for PI events with 1 LFA on 1/20/25  -Not listed for OHTx    Procedure: Device Interrogation Including analysis of device parameters  Current Settings: Ventricular Assist Device        1/25/2025     7:20 AM 1/25/2025     4:30 AM 1/25/2025    12:00 AM 1/24/2025     8:25 PM 1/24/2025     3:33 PM 1/24/2025    12:37 PM 1/24/2025     7:57 AM   TXP LVAD INTERROGATIONS   Type HeartMate3 HeartMate3 HeartMate3 HeartMate3 HeartMate3 HeartMate3 HeartMate3   Flow 4 4.3 4 4.1 3.9 4.1 4.1   Speed 5100 5100 5100 5100 5100 5100 5100   PI 6 4.3 6.5 3.4 6.4 5 5.5   Power (Serna) 3.7 3.7 3.6 3.6 3.6 3.6 3.6   LSL 4700 4700 4700 4700 4700 4700 4700   Pulsatility Intermittent pulse Intermittent pulse Intermittent pulse Intermittent pulse Intermittent pulse Intermittent pulse          Acute on chronic systolic CHF (congestive heart failure)  -NIC  -Last 2D Echo 1/19/25 with EF 10 -15%. G1 DD. RV systolic function severely reduced. Mod to severe TR. PASP  31 mmHg. IVC/SVC: 15 mmHg.  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  (LVAD)  -Hx of chronic DLES on Cefadroxil for suppression  -cont current reg of cefazolin IV, need abx x 6wk  -HH orders placed    HTN (hypertension)  -On home dose of Amlodipine and Eplerenone    Temporal lobe seizure  -On home dose of Keppra     Chronic anticoagulation  -See above VAD    Right heart failure secondary to left heart failure-post LVAD surgery  -Weaned off inotropes 8/2024  -See above heart failure     Ascites  -Will monitor with diuresis   - Labs negative for SBP - follow cultures - NGTD    Hypokalemia  Replace and monitor per ptotocol        DEYA Martinez  Heart Transplant  [facility]

## 2025-01-25 NOTE — HOSPITAL COURSE
[facility] 6/29/22 5100 admitted with ADHF/bacteremia. Diuresed on lasix gtt with good response and he was transitioned to po diuretics  Torsemide 60mg BID.  Throughout his hospital stay; patient is net negative 26L.  Weight on day of discharge was 134lbs down from 163lbs on admit.    Throughout his hospital stay; he was found to have MSSA bacteremia.  ID consulted and plan is for Cefazolin until 2/26.  Midline placed for home abx as PICC team could not place PICC and abx duration appropriate for midline vs tunneled line. Ophtho- consulted for blurry visit and no concern for infectious cause. Transthoracic Echo without vegetation.     Patient discharged to home with home health.  Infusion company has been arranged as well as Medicaid transportation

## 2025-01-26 NOTE — PLAN OF CARE
Patient is ready for discharge. Patient stable alert and oriented. IVs and telemetry removed. Midline remains, as patient is getting home infusions. Home infusion education completed. No complaints of pain. Discussed discharge plan. Reviewed medications and side effects, appointments, and answered questions with patient and family. IV abx delivered to pt home, pt and family aware.

## 2025-01-27 ENCOUNTER — TELEPHONE (OUTPATIENT)
Dept: TRANSPLANT | Facility: CLINIC | Age: 40
End: 2025-01-27
Payer: MEDICAID

## 2025-01-27 LAB — BACTERIA SPEC ANAEROBE CULT: NORMAL

## 2025-01-27 NOTE — TELEPHONE ENCOUNTER
Patient left his UBC over at the hospital after discharge. I was able to locate the UBC. Spoke with Erica's mother Malou, she said they were going to grab it today before 4.

## 2025-01-27 NOTE — TELEPHONE ENCOUNTER
Malou paged to report he went home yesterday and they just realized he doesn't have his UBC.  I asked if they could call the hospital so see if maybe it is at the desk.  I explained they will need to come tomorrow to pick it up or a loaner. Malou verbalized she is sorry for paging so late and ok.

## 2025-01-28 ENCOUNTER — TELEPHONE (OUTPATIENT)
Dept: TRANSPLANT | Facility: CLINIC | Age: 40
End: 2025-01-28
Payer: MEDICAID

## 2025-01-28 ENCOUNTER — LAB VISIT (OUTPATIENT)
Dept: LAB | Facility: HOSPITAL | Age: 40
End: 2025-01-28
Attending: INTERNAL MEDICINE
Payer: MEDICAID

## 2025-01-28 ENCOUNTER — ANTI-COAG VISIT (OUTPATIENT)
Dept: CARDIOLOGY | Facility: CLINIC | Age: 40
End: 2025-01-28
Payer: MEDICAID

## 2025-01-28 DIAGNOSIS — Z95.811 LVAD (LEFT VENTRICULAR ASSIST DEVICE) PRESENT: Primary | ICD-10-CM

## 2025-01-28 DIAGNOSIS — Z95.811 LVAD (LEFT VENTRICULAR ASSIST DEVICE) PRESENT: ICD-10-CM

## 2025-01-28 LAB
INR PPP: 1.6 (ref 0.8–1.2)
PROTHROMBIN TIME: 16.6 SEC (ref 9–12.5)

## 2025-01-28 PROCEDURE — 85610 PROTHROMBIN TIME: CPT | Performed by: INTERNAL MEDICINE

## 2025-01-28 PROCEDURE — 36415 COLL VENOUS BLD VENIPUNCTURE: CPT | Performed by: INTERNAL MEDICINE

## 2025-01-28 PROCEDURE — 93793 ANTICOAG MGMT PT WARFARIN: CPT | Mod: ,,,

## 2025-01-28 NOTE — TELEPHONE ENCOUNTER
Tried to call patient's mother to see when they are coming to get patients [facility]. She did not answer but I left a voicemail with my name and call back number.

## 2025-01-31 NOTE — PROGRESS NOTES
INR not at goal. Medications, chart, and patient findings reviewed. See calendar for adjustments to dose and follow up plan.    
Patient mother frantz rescheduled missed appointment from 1/30/25 to 2/3/25      
Spoke with patient  and his mother frantz regarding recent INR results .  Patient questioned and verified correct dosage . Reported appetite is well and more thirsty than usual no recent changes .    
Acute alcohol abuse

## 2025-02-03 LAB
FUNGUS SPEC CULT: NORMAL
FUNGUS SPEC CULT: NORMAL

## 2025-02-05 ENCOUNTER — TELEPHONE (OUTPATIENT)
Dept: INFECTIOUS DISEASES | Facility: CLINIC | Age: 40
End: 2025-02-05
Payer: MEDICAID

## 2025-02-05 ENCOUNTER — PATIENT MESSAGE (OUTPATIENT)
Dept: TRANSPLANT | Facility: CLINIC | Age: 40
End: 2025-02-05
Payer: MEDICAID

## 2025-02-05 NOTE — TELEPHONE ENCOUNTER
Pt missed virtual visit with me - called pt's listed phone number (mother) - spoke with Ms. Dumont - she states that pt was in court during appt and would like to reschedule. Clinic visit rescheduled to 2/12 at 1pm virtual. She states that pt is doing well on IV abx, no issues with abx or with line.

## 2025-02-07 ENCOUNTER — ANTI-COAG VISIT (OUTPATIENT)
Dept: CARDIOLOGY | Facility: CLINIC | Age: 40
End: 2025-02-07
Payer: MEDICAID

## 2025-02-07 ENCOUNTER — LAB VISIT (OUTPATIENT)
Dept: LAB | Facility: HOSPITAL | Age: 40
End: 2025-02-07
Attending: INTERNAL MEDICINE
Payer: MEDICAID

## 2025-02-07 DIAGNOSIS — Z95.811 LVAD (LEFT VENTRICULAR ASSIST DEVICE) PRESENT: ICD-10-CM

## 2025-02-07 DIAGNOSIS — Z95.811 LVAD (LEFT VENTRICULAR ASSIST DEVICE) PRESENT: Primary | ICD-10-CM

## 2025-02-07 LAB
INR PPP: 1.3
PROTHROMBIN TIME: 16.5 SECONDS (ref 12.5–14.5)

## 2025-02-07 PROCEDURE — 36415 COLL VENOUS BLD VENIPUNCTURE: CPT

## 2025-02-07 PROCEDURE — 85610 PROTHROMBIN TIME: CPT

## 2025-02-07 PROCEDURE — 93793 ANTICOAG MGMT PT WARFARIN: CPT | Mod: ,,,

## 2025-02-07 NOTE — PROGRESS NOTES
Patient mother advised of dose instructions and verbalized understanding.  Advised compliance with lab dates.

## 2025-02-07 NOTE — PROGRESS NOTES
Spoke with patient regarding recent INR results .  Patient questioned and verified correct dosage . Reported  spinach dip intake recently and patient is feeling well -no recent changes .

## 2025-02-11 ENCOUNTER — PATIENT MESSAGE (OUTPATIENT)
Dept: INFECTIOUS DISEASES | Facility: CLINIC | Age: 40
End: 2025-02-11
Payer: MEDICAID

## 2025-02-11 NOTE — PROGRESS NOTES
Patient mother frantz rescheduled missed lab  appointment from 2/10 to 2/11/25 02/12/2025 9:44 AM - Pt was a lab no show 2/11/25 , Called to r/s missed labs but no answer / unable to lvm     02/17/2025 9:24 AM - Patient mother frantz rescheduled missed appointment from 2/11/25 to 02/17/2025

## 2025-02-12 ENCOUNTER — OFFICE VISIT (OUTPATIENT)
Dept: INFECTIOUS DISEASES | Facility: CLINIC | Age: 40
End: 2025-02-12
Payer: MEDICAID

## 2025-02-12 DIAGNOSIS — T82.7XXA INFECTION ASSOCIATED WITH DRIVELINE OF LEFT VENTRICULAR ASSIST DEVICE (LVAD): Primary | ICD-10-CM

## 2025-02-12 DIAGNOSIS — R78.81 BACTEREMIA DUE TO STAPHYLOCOCCUS: ICD-10-CM

## 2025-02-12 DIAGNOSIS — B95.8 BACTEREMIA DUE TO STAPHYLOCOCCUS: ICD-10-CM

## 2025-02-12 RX ORDER — CEFADROXIL 500 MG/1
500 CAPSULE ORAL EVERY 12 HOURS
Qty: 60 CAPSULE | Refills: 11 | Status: SHIPPED | OUTPATIENT
Start: 2025-02-12 | End: 2026-02-12

## 2025-02-12 NOTE — PROGRESS NOTES
Audio Only Telehealth Visit     The patient location is: [place]  The chief complaint leading to consultation is: f/u   Visit type: Virtual visit with audio only (telephone)  Total time spent in medical discussion with patient: 30 minutes  Total time spent on date of the encounter: 60 minutes       The reason for the audio only service rather than synchronous audio and video virtual visit was related to technical difficulties or patient preference/necessity.       Each patient to whom I provide medical services by telemedicine is:  (1) informed of the relationship between the physician and patient and the respective role of any other health care provider with respect to management of the patient; and (2) notified that they may decline to receive medical services by telemedicine and may withdraw from such care at any time. Patient verbally consented to receive this service via voice-only telephone call.       HPI: 38 yo male with DT LVAD with recurrent MSSA DLESI, bacteremia c/b empyema s/p treatment, prior candida parapsilosis fungemia s/p tx with recent admission for MSSA bacteremia. He was discharged from the hospital on 1/25/25 with 6w course of cefazolin, maria isabel 2/26. Pt stated that he was unable to get blood work and has not a dressing change done for his PICC. He reports that he has been tolerating IV abx and has not had fevers or chills. Pt and mother (Malou) were on speaker phone. Pt states that he has not gotten lab work recently. He also states that he has not had his dressing changed on his line since he left the hospital. I spoke with his infusion company and pt has not been adherent to his abx nor getting lab work. I had a prolonged discussion with pt and his mother that going forward, he is not a candidate for IV abx. I discussed that not getting blood work or dressing changes is dangerous and can put him at risk for infection. He has not notifid me or my clinic know that HH has not been coming to his house for  lab work or dressing changes.       Assessment and plan:      Pull line and stop IV abx, he reportedly has completed 4w of iv abx   -orders given to bioscript to get line removed, discussed with pt that bioscript will call to schedule line removal  Continue suppressive cefadroxil 500 mg bid   Answered all questions                         This service was not originating from a related E/M service provided within the previous 7 days nor will  to an E/M service or procedure within the next 24 hours or my soonest available appointment.  Prevailing standard of care was able to be met in this audio-only visit.

## 2025-02-17 ENCOUNTER — TELEPHONE (OUTPATIENT)
Dept: TRANSPLANT | Facility: CLINIC | Age: 40
End: 2025-02-17
Payer: MEDICAID

## 2025-02-17 ENCOUNTER — LAB VISIT (OUTPATIENT)
Dept: LAB | Facility: HOSPITAL | Age: 40
End: 2025-02-17
Attending: INTERNAL MEDICINE
Payer: MEDICAID

## 2025-02-17 ENCOUNTER — ANTI-COAG VISIT (OUTPATIENT)
Dept: CARDIOLOGY | Facility: CLINIC | Age: 40
End: 2025-02-17
Payer: MEDICAID

## 2025-02-17 DIAGNOSIS — Z95.811 LVAD (LEFT VENTRICULAR ASSIST DEVICE) PRESENT: ICD-10-CM

## 2025-02-17 DIAGNOSIS — Z95.811 LVAD (LEFT VENTRICULAR ASSIST DEVICE) PRESENT: Primary | ICD-10-CM

## 2025-02-17 LAB
INR PPP: 1.5
PROTHROMBIN TIME: 17.8 SECONDS (ref 12.5–14.5)

## 2025-02-17 PROCEDURE — 36415 COLL VENOUS BLD VENIPUNCTURE: CPT

## 2025-02-17 PROCEDURE — 85610 PROTHROMBIN TIME: CPT

## 2025-02-17 NOTE — PROGRESS NOTES
[facility]ticoagulation [facility]    02/17/2025    Kevan Queen (39 y.o.) is followed by the [facility].      Assessment/Plan:    Kevan Queen presents with a subtherapeutic  INR. Goal INR: 2.0-3.0    Lab Results   Component Value Date    INR 1.5 (H) 02/17/2025    INR 1.3 02/07/2025    INR 1.6 (H) 01/28/2025       Assessment of patient findings per MA/LPN and chart review:   The following significant findings were found:   Patient's mom reports raised bumps on patient's back thought to be secondary to toresmide use  Of note, patient does have a noted allergy to bumetadine with a reaction of hives    Recommendation for patient's warfarin regimen:   Weekly warfarin dose increased due to repeated subtherapeutic INRs.    Recommended repeat INR in 3 days      Nela Morgan, PharmD, BCPS  Clinical Pharmacist - [facility]  Preferred Contact: Secure Messaging or In Basket Message

## 2025-02-17 NOTE — PROGRESS NOTES
Pt mother advised on dosing per calendar and INR test date 2/20/25, lab scheduled. Pt mother advised to contact LVAD team w/ medication questions. Advised pt is able to use benadryl. Pt mother verbalized understanding.

## 2025-02-17 NOTE — TELEPHONE ENCOUNTER
Mom paged, states patient has blisters all over his back, arms and chest. She thinks this is a side effect of torsemide. Explained that is unlikely that this is due to torsemide and should be evaluated at PCP or urgent care. Also states that she is concerned about what antibiotic he is supposed to be on. Confirmed with last ID note that patient is to remain on cefadroxil for chronic suppression. Reminded that if any new medications are prescribed to contact VAD Coordinator. Verbalized understanding.

## 2025-02-21 ENCOUNTER — OFFICE VISIT (OUTPATIENT)
Dept: INFECTIOUS DISEASES | Facility: CLINIC | Age: 40
End: 2025-02-21
Payer: MEDICAID

## 2025-02-21 DIAGNOSIS — Z95.811 LVAD (LEFT VENTRICULAR ASSIST DEVICE) PRESENT: ICD-10-CM

## 2025-02-21 DIAGNOSIS — T82.7XXA INFECTION ASSOCIATED WITH DRIVELINE OF LEFT VENTRICULAR ASSIST DEVICE (LVAD): Primary | ICD-10-CM

## 2025-02-21 DIAGNOSIS — R78.81 BACTEREMIA DUE TO STAPHYLOCOCCUS: ICD-10-CM

## 2025-02-21 DIAGNOSIS — Z79.2 ANTIBIOTIC LONG-TERM USE: ICD-10-CM

## 2025-02-21 DIAGNOSIS — B95.8 BACTEREMIA DUE TO STAPHYLOCOCCUS: ICD-10-CM

## 2025-02-21 NOTE — PROGRESS NOTES
The patient location is: [place]  The chief complaint leading to consultation is: f/u    Visit type: audiovisual    20 minutes of total time spent on the encounter, which includes face to face time and non-face to face time preparing to see the patient (eg, review of tests), Obtaining and/or reviewing separately obtained history, Documenting clinical information in the electronic or other health record, Independently interpreting results (not separately reported) and communicating results to the patient/family/caregiver, or Care coordination (not separately reported).         Each patient to whom he or she provides medical services by telemedicine is:  (1) informed of the relationship between the physician and patient and the respective role of any other health care provider with respect to management of the patient; and (2) notified that he or she may decline to receive medical services by telemedicine and may withdraw from such care at any time.    Notes:             INFECTIOUS DISEASE CLINIC  02/21/2025     Subjective:      Chief Complaint:   Chief Complaint   Patient presents with    Follow-up       History of Present Illness:    This is a 39 y.o. male with LVAD c/b prior MSSA DLESI with recent admission for COVID, DLESI and MSSA bacteremia c/b L empyema (maintained on ancef, maria isabel 9/14) who is referred to my clinic for follow up.  Patient known to ID, please see prior notes for full details. Doing well since discharge, reports tolerating ancef without issues. PICC line inadvertently was pulled a couple of weeks ago and went to ER to get it replaced with CXR done at that time with interval improvement in L sided opacity.  Denies fevers, chills, drainage from DLES or diarrhea. Reports breathing is better, still with minimal L sided pleuritic chest pain.     Interval history:  9/15/23: Pt states he is doing well. Tolerating ancef without issues, on last bulb. No fevers, chills, dlesi drainage, reports minimal L pleuritic  CP. Denies issues with PICC. Per chart review, pt was recently admitted for seizure like activity. Denies further seizures, HA or vision changes. Late to our appt - spoke with mother who got in touch with pt to get on virtual.   11/29/23: Doing well since discharged - was sent home on 2w course of dapto - completed without issues. Has PICC in for milrinone, denies problems. No fevers or chills. Reports some bloody drainage from prior ICD site. No drainage over DLES.  1/31/24: Had a seizure episode earlier this month, may have pulled DL a little. Denied issues with cefadroxil, tolerating without issues and denies missed doses. Per partner, pt had an episode a couple weeks ago of drainage from DL - [place] [facility] [place] drainage cleared up prior to being seen. No fevers or chills. Denies further issues or drainage from DL/prior ICD site.   10/4/24: Staying at his mom's - doing well since discharge. Tolerating iv abx/fluconazole and denies issues with PICC. No fevers or chills, denies worsening drainage from DLES. No fevers or chills. Pt reports feeling better.   2/21/25: Doing well since last seen, old line removed at [email or web address]. Tolerating cefadroxil without issues.  He reports hives with one of his fluid pills. No fevers or chills.     Review of Systems   Constitutional: Negative for chills and fever.   All other systems reviewed and are negative.        Past Medical History:   Diagnosis Date    Acute respiratory failure with hypoxia 07/22/2023    Arthritis     Awareness alteration, transient 09/01/2022    Cardiomyopathy     CHF (congestive heart failure) 10/01/2020    COVID-19 06/03/2023    Diabetes mellitus     Dilated cardiomyopathy 10/26/2020    Drug abuse 10/2020    Headache 04/19/2023    Hyperglycemia 12/16/2022    Hyperosmolar hyperglycemic state (HHS) 05/25/2022    ICD (implantable cardioverter-defibrillator) in place 10/26/2020    Infected defibrillator now s/p removal Nov 2023 07/23/2023    Left  ventricular assist device (LVAD) complication, initial encounter 06/05/2023    Muscle cramping 06/15/2022    Renal disorder     SOB (shortness of breath) 06/13/2022    Tingling in extremities 07/13/2022     Past Surgical History:   Procedure Laterality Date    APPLICATION OF WOUND VACUUM-ASSISTED CLOSURE DEVICE N/A 6/30/2022    Procedure: APPLICATION, WOUND VAC;  Surgeon: Luis F Paige MD;  [facility];  Service: Cardiovascular;  Laterality: N/A;  50 x 5 cm    CARDIAC DEFIBRILLATOR PLACEMENT      COLONOSCOPY N/A 9/25/2024    Procedure: COLONOSCOPY;  Surgeon: Drake Barton MD;  Location: [facility] ENDO (2ND FLR);  Service: Endoscopy;  Laterality: N/A;    ECHOCARDIOGRAM,TRANSESOPHAGEAL  11/9/2023    Procedure: Transesophageal echo (GUILLERMO) intra-procedure log documentation;  Surgeon: Eron Ivy MD;  Location: [facility] EP LAB;  Service: Cardiology;;    ESOPHAGOGASTRODUODENOSCOPY N/A 9/25/2024    Procedure: EGD (ESOPHAGOGASTRODUODENOSCOPY);  Surgeon: Drake Barton MD;  Location: [facility] ENDO (2ND FLR);  Service: Endoscopy;  Laterality: N/A;    EXTRACTION, ELECTRODE LEAD Left 11/9/2023    Procedure: EXTRACTION, ELECTRODE LEAD;  Surgeon: ADALID Leon MD;  Location: [facility] EP LAB;  Service: Cardiology;  Laterality: Left;  INFECTION, LEAD EXTRACTION, GUILLERMO, BSCI, ANES, EH,   *NO CTS BACKUP*    IMPLANTATION OF RIGHT VENTRICULAR ASSIST DEVICE (RVAD) N/A 6/29/2022    Procedure: INSERTION, RVAD;  Surgeon: Luis F Paige MD;  [facility];  Service: Cardiovascular;  Laterality: N/A;    INSERTION OF GRAFT TO PERICARDIUM Right 6/30/2022    Procedure: INSERTION-RIGHT VENTRICULAR ASSIST DEVICE;  Surgeon: Luis F Paige MD;  [facility];  Service: Cardiovascular;  Laterality: Right;    IRRIGATION OF MEDIASTINUM  6/30/2022    Procedure: IRRIGATION, MEDIASTINUM;  Surgeon: Luis F Paige MD;  [facility];  Service: Cardiovascular;;    LEFT VENTRICULAR ASSIST DEVICE Left  6/23/2022    Procedure: INSERTION-LEFT VENTRICULAR ASSIST DEVICE;  Surgeon: Luis F Paige MD;  Location: [facility] OR [facility] FLR;  Service: Cardiovascular;  Laterality: Left;    LEFT VENTRICULAR ASSIST DEVICE N/A 6/29/2022    Procedure: INSERTION-LEFT VENTRICULAR ASSIST DEVICE;  Surgeon: Luis F Paige MD;  Location: [facility] OR [facility] FLR;  Service: Cardiovascular;  Laterality: N/A;    REVISION OF SKIN POCKET FOR CARDIOVERTER-DEFIBRILLATOR  11/9/2023    Procedure: REVISION, SKIN POCKET, FOR CARDIOVERTER-DEFIBRILLATOR;  Surgeon: ADALID Leon MD;  Location: [facility] EP LAB;  Service: Cardiology;;    RIGHT HEART CATHETERIZATION Right 4/8/2022    Procedure: INSERTION, CATHETER, RIGHT HEART;  Surgeon: Luca Lopez Jr., MD;  Location: [facility] CATH LAB;  Service: Cardiology;  Laterality: Right;    RIGHT HEART CATHETERIZATION Right 4/19/2022    Procedure: INSERTION, CATHETER, RIGHT HEART;  Surgeon: Josh Pulido MD;  Location: [facility] CATH LAB;  Service: Cardiology;  Laterality: Right;    RIGHT HEART CATHETERIZATION Right 7/21/2022    Procedure: INSERTION, CATHETER, RIGHT HEART;  Surgeon: Dalia Crum MD;  Location: [facility] CATH LAB;  Service: Cardiology;  Laterality: Right;    RIGHT HEART CATHETERIZATION Right 10/31/2022    Procedure: INSERTION, CATHETER, RIGHT HEART;  Surgeon: Dalia Crum MD;  Location: [facility] CATH LAB;  Service: Cardiology;  Laterality: Right;    STERNAL WOUND CLOSURE N/A 6/30/2022    Procedure: CLOSURE, WOUND, STERNUM;  Surgeon: Luis F Paige MD;  [facility];  Service: Cardiovascular;  Laterality: N/A;     Family History   Problem Relation Name Age of Onset    Heart failure Father      Diverticulosis Brother      Heart attack Maternal Grandmother      Heart attack Maternal Grandfather       Social History     Tobacco Use    Smoking status: Former     Current packs/day: 0.00     Average packs/day: 0.5 packs/day for 16.6 years (8.3 ttl pk-yrs)     Types: Cigarettes     Start date:  10/1/2004     Quit date: 4/23/2021     Years since quitting: 3.8    Smokeless tobacco: Never   Substance Use Topics    Alcohol use: Not Currently    Drug use: Not Currently     Types: Marijuana, MDMA (Ecstacy)       Review of patient's allergies indicates:   Allergen Reactions    Bumex [bumetanide] Hives         Objective:   VS (24h): There were no vitals filed for this visit.      Physical Exam  Constitutional:       General: He is not in acute distress.     Appearance: He is not ill-appearing.   Pulmonary:      Effort: Pulmonary effort is normal.   Neurological:      Mental Status: He is alert and oriented to person, place, and time.         Micro:   1/19/25: Blood cx ngtd  1/14/25: MSSA blcx  9/4/24: blcx ngtd  8/28/24, 9/1/24: Blcx cparapsilosis (flu PARISA 1, jan 0.5, vori 0.03)  8/27/24: blcx Cparap, MSSA  8/27/24 DLES MSSA    2023 11/9 OR cx - MSSA, MRSE (superficial/deep pocket); cx from lead tip ngtd  11/8 blcx ngtd  8/5 blcx ngtd  8/3 pleural cx ngtd  7/28 blcx MSSA          Immunization History   Administered Date(s) Administered    COVID-19, MRNA, LN-S, PF (Pfizer) (Purple Cap) 12/08/2021, 12/29/2021    DTP 1985, 1985, 1985, 06/01/1987    HIB 02/05/1990    Influenza - Quadrivalent - PF *Preferred* (6 months and older) 10/16/2020, 09/24/2021, 12/16/2022    MMR 06/01/1987    OPV 1985, 1985, 06/01/1987         Assessment:     1. 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 Bacteremia due to Staphylococcus    3. LVAD (left ventricular assist device) present    4. Antibiotic long-term use          39 y.o. 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 Since last visit, line was removed due to nonadherence. He states he has been tolerating cefadroxil without issues. Denies ongoing s/s of infection.     Plan:     -continue suppressive cefadroxil 500 mg BID lifelong  -advised pt to reach out to his VAD team re: fluid pills            Follow up in 4m, virtual visit preferred per pt    Patient was given ample time for questions, all questions answered. Strict return precautions given to patient. Visit today addressed a complex issue that requires longitudinal care and follow up.        Laura Baldwin MD  Infectious Disease

## 2025-02-25 ENCOUNTER — TELEPHONE (OUTPATIENT)
Dept: TRANSPLANT | Facility: CLINIC | Age: 40
End: 2025-02-25
Payer: MEDICAID

## 2025-02-25 NOTE — TELEPHONE ENCOUNTER
VAD Contact: Left voicemail for Caregiver and patient  regarding equipment maintenance due at upcoming clinic appointment on 2/27.  Requested Patient and Caregiver    to bring Mobile Power Unit (MPU) and Emergency Bag (2 batteries, 2 clips, 1 backup controller) with them to next appointment for maintenance. It is medically necessary to ensure patient has properly functioning equipment and wearables to prevent infection, injury or death to patient.

## 2025-02-26 RX ORDER — FUROSEMIDE 80 MG/1
80 TABLET ORAL 2 TIMES DAILY
Qty: 60 TABLET | Refills: 11 | Status: SHIPPED | OUTPATIENT
Start: 2025-02-26 | End: 2026-02-26

## 2025-02-27 ENCOUNTER — LAB VISIT (OUTPATIENT)
Dept: LAB | Facility: HOSPITAL | Age: 40
End: 2025-02-27
Attending: INTERNAL MEDICINE
Payer: MEDICAID

## 2025-02-27 ENCOUNTER — OFFICE VISIT (OUTPATIENT)
Dept: TRANSPLANT | Facility: CLINIC | Age: 40
End: 2025-02-27
Payer: MEDICAID

## 2025-02-27 ENCOUNTER — CLINICAL SUPPORT (OUTPATIENT)
Dept: TRANSPLANT | Facility: CLINIC | Age: 40
End: 2025-02-27
Payer: MEDICAID

## 2025-02-27 ENCOUNTER — ANTI-COAG VISIT (OUTPATIENT)
Dept: CARDIOLOGY | Facility: CLINIC | Age: 40
End: 2025-02-27
Payer: MEDICAID

## 2025-02-27 VITALS — WEIGHT: 154 LBS | BODY MASS INDEX: 19.15 KG/M2 | HEIGHT: 75 IN | TEMPERATURE: 98 F | SYSTOLIC BLOOD PRESSURE: 80 MMHG

## 2025-02-27 DIAGNOSIS — I50.84 CHF (NYHA CLASS IV, ACC/AHA STAGE D): ICD-10-CM

## 2025-02-27 DIAGNOSIS — I50.42 CHRONIC COMBINED SYSTOLIC AND DIASTOLIC HEART FAILURE: ICD-10-CM

## 2025-02-27 DIAGNOSIS — Z95.811 LVAD (LEFT VENTRICULAR ASSIST DEVICE) PRESENT: ICD-10-CM

## 2025-02-27 DIAGNOSIS — Z95.811 LVAD (LEFT VENTRICULAR ASSIST DEVICE) PRESENT: Primary | ICD-10-CM

## 2025-02-27 DIAGNOSIS — T82.7XXA INFECTION ASSOCIATED WITH DRIVELINE OF LEFT VENTRICULAR ASSIST DEVICE (LVAD): ICD-10-CM

## 2025-02-27 DIAGNOSIS — I50.814 RIGHT HEART FAILURE SECONDARY TO LEFT HEART FAILURE: ICD-10-CM

## 2025-02-27 LAB
ALBUMIN SERPL BCP-MCNC: 2.8 G/DL (ref 3.5–5.2)
ALP SERPL-CCNC: 172 U/L (ref 40–150)
ALT SERPL W/O P-5'-P-CCNC: 9 U/L (ref 10–44)
ANION GAP SERPL CALC-SCNC: 10 MMOL/L (ref 8–16)
AST SERPL-CCNC: 32 U/L (ref 10–40)
BASOPHILS # BLD AUTO: 0.03 K/UL (ref 0–0.2)
BASOPHILS NFR BLD: 0.3 % (ref 0–1.9)
BILIRUB DIRECT SERPL-MCNC: 0.8 MG/DL (ref 0.1–0.3)
BILIRUB SERPL-MCNC: 1.2 MG/DL (ref 0.1–1)
BNP SERPL-MCNC: 1273 PG/ML (ref 0–99)
BUN SERPL-MCNC: 12 MG/DL (ref 6–20)
CALCIUM SERPL-MCNC: 8.1 MG/DL (ref 8.7–10.5)
CHLORIDE SERPL-SCNC: 100 MMOL/L (ref 95–110)
CHOLEST SERPL-MCNC: 78 MG/DL (ref 120–199)
CHOLEST/HDLC SERPL: 2.8 {RATIO} (ref 2–5)
CO2 SERPL-SCNC: 22 MMOL/L (ref 23–29)
CREAT SERPL-MCNC: 1.3 MG/DL (ref 0.5–1.4)
CRP SERPL-MCNC: 17.6 MG/L (ref 0–8.2)
DIFFERENTIAL METHOD BLD: ABNORMAL
EOSINOPHIL # BLD AUTO: 0 K/UL (ref 0–0.5)
EOSINOPHIL NFR BLD: 0.3 % (ref 0–8)
ERYTHROCYTE [DISTWIDTH] IN BLOOD BY AUTOMATED COUNT: 22.5 % (ref 11.5–14.5)
EST. GFR  (NO RACE VARIABLE): >60 ML/MIN/1.73 M^2
GLUCOSE SERPL-MCNC: 204 MG/DL (ref 70–110)
GRAM STN SPEC: NORMAL
GRAM STN SPEC: NORMAL
HCT VFR BLD AUTO: 28.8 % (ref 40–54)
HDLC SERPL-MCNC: 28 MG/DL (ref 40–75)
HDLC SERPL: 35.9 % (ref 20–50)
HGB BLD-MCNC: 7.8 G/DL (ref 14–18)
IMM GRANULOCYTES # BLD AUTO: 0.03 K/UL (ref 0–0.04)
IMM GRANULOCYTES NFR BLD AUTO: 0.3 % (ref 0–0.5)
INR PPP: 1.3 (ref 0.8–1.2)
LDH SERPL L TO P-CCNC: 233 U/L (ref 110–260)
LDLC SERPL CALC-MCNC: 36.2 MG/DL (ref 63–159)
LYMPHOCYTES # BLD AUTO: 1.2 K/UL (ref 1–4.8)
LYMPHOCYTES NFR BLD: 13.2 % (ref 18–48)
MAGNESIUM SERPL-MCNC: 1.6 MG/DL (ref 1.6–2.6)
MCH RBC QN AUTO: 17 PG (ref 27–31)
MCHC RBC AUTO-ENTMCNC: 27.1 G/DL (ref 32–36)
MCV RBC AUTO: 63 FL (ref 82–98)
MONOCYTES # BLD AUTO: 1.1 K/UL (ref 0.3–1)
MONOCYTES NFR BLD: 11.9 % (ref 4–15)
NEUTROPHILS # BLD AUTO: 6.9 K/UL (ref 1.8–7.7)
NEUTROPHILS NFR BLD: 74 % (ref 38–73)
NONHDLC SERPL-MCNC: 50 MG/DL
NRBC BLD-RTO: 0 /100 WBC
PHOSPHATE SERPL-MCNC: 2.4 MG/DL (ref 2.7–4.5)
PLATELET # BLD AUTO: 243 K/UL (ref 150–450)
PMV BLD AUTO: 9.2 FL (ref 9.2–12.9)
POTASSIUM SERPL-SCNC: 2.7 MMOL/L (ref 3.5–5.1)
PREALB SERPL-MCNC: 14 MG/DL (ref 20–43)
PROT SERPL-MCNC: 8.4 G/DL (ref 6–8.4)
PROTHROMBIN TIME: 14.1 SEC (ref 9–12.5)
RBC # BLD AUTO: 4.58 M/UL (ref 4.6–6.2)
SODIUM SERPL-SCNC: 132 MMOL/L (ref 136–145)
TRIGL SERPL-MCNC: 69 MG/DL (ref 30–150)
WBC # BLD AUTO: 9.34 K/UL (ref 3.9–12.7)

## 2025-02-27 PROCEDURE — 99999PBSHW PR PBB SHADOW TECHNICAL ONLY FILED TO HB: Mod: PBBFAC,,,

## 2025-02-27 PROCEDURE — 86140 C-REACTIVE PROTEIN: CPT | Performed by: INTERNAL MEDICINE

## 2025-02-27 PROCEDURE — 83880 ASSAY OF NATRIURETIC PEPTIDE: CPT | Performed by: INTERNAL MEDICINE

## 2025-02-27 PROCEDURE — 36415 COLL VENOUS BLD VENIPUNCTURE: CPT | Performed by: INTERNAL MEDICINE

## 2025-02-27 PROCEDURE — 80053 COMPREHEN METABOLIC PANEL: CPT | Performed by: INTERNAL MEDICINE

## 2025-02-27 PROCEDURE — 87075 CULTR BACTERIA EXCEPT BLOOD: CPT | Performed by: STUDENT IN AN ORGANIZED HEALTH CARE EDUCATION/TRAINING PROGRAM

## 2025-02-27 PROCEDURE — 99214 OFFICE O/P EST MOD 30 MIN: CPT | Mod: PBBFAC | Performed by: INTERNAL MEDICINE

## 2025-02-27 PROCEDURE — 93793 ANTICOAG MGMT PT WARFARIN: CPT | Mod: ,,,

## 2025-02-27 PROCEDURE — 87186 SC STD MICRODIL/AGAR DIL: CPT | Performed by: STUDENT IN AN ORGANIZED HEALTH CARE EDUCATION/TRAINING PROGRAM

## 2025-02-27 PROCEDURE — 87070 CULTURE OTHR SPECIMN AEROBIC: CPT | Performed by: STUDENT IN AN ORGANIZED HEALTH CARE EDUCATION/TRAINING PROGRAM

## 2025-02-27 PROCEDURE — 84100 ASSAY OF PHOSPHORUS: CPT | Performed by: INTERNAL MEDICINE

## 2025-02-27 PROCEDURE — 85025 COMPLETE CBC W/AUTO DIFF WBC: CPT | Performed by: INTERNAL MEDICINE

## 2025-02-27 PROCEDURE — 99999 PR PBB SHADOW E&M-EST. PATIENT-LVL IV: CPT | Mod: PBBFAC,,, | Performed by: INTERNAL MEDICINE

## 2025-02-27 PROCEDURE — 83735 ASSAY OF MAGNESIUM: CPT | Performed by: INTERNAL MEDICINE

## 2025-02-27 PROCEDURE — 87077 CULTURE AEROBIC IDENTIFY: CPT | Performed by: STUDENT IN AN ORGANIZED HEALTH CARE EDUCATION/TRAINING PROGRAM

## 2025-02-27 PROCEDURE — 87205 SMEAR GRAM STAIN: CPT | Performed by: STUDENT IN AN ORGANIZED HEALTH CARE EDUCATION/TRAINING PROGRAM

## 2025-02-27 PROCEDURE — 85610 PROTHROMBIN TIME: CPT | Performed by: INTERNAL MEDICINE

## 2025-02-27 PROCEDURE — 82248 BILIRUBIN DIRECT: CPT | Performed by: INTERNAL MEDICINE

## 2025-02-27 PROCEDURE — 80061 LIPID PANEL: CPT | Performed by: INTERNAL MEDICINE

## 2025-02-27 PROCEDURE — 83615 LACTATE (LD) (LDH) ENZYME: CPT | Performed by: INTERNAL MEDICINE

## 2025-02-27 PROCEDURE — 84134 ASSAY OF PREALBUMIN: CPT | Performed by: INTERNAL MEDICINE

## 2025-02-27 RX ORDER — POTASSIUM CHLORIDE 750 MG/1
30 TABLET, EXTENDED RELEASE ORAL DAILY
Qty: 90 TABLET | Refills: 11 | Status: CANCELLED | OUTPATIENT
Start: 2025-02-27

## 2025-02-27 NOTE — PROGRESS NOTES
Spoke with patient regarding recent INR results .  Patient questioned and verified incorrect dosage of 3mg sun/fri ; 1.5mg all others. Reported cabbage intake -  no other recent changes .

## 2025-02-27 NOTE — LETTER
April 6, 2025        Inderjit Hendricks  [address]  [address]  [place] [address]  Phone: [phone]  Fax: [phone]     Josh Pulido  [address] [address]  Phone: [phone]  Fax: [phone]             [id number]  [address]  [place] [address]  Phone: [phone]   Patient: Kevan Queen   MR Number: [id number]   YOB: 1985   Date of Visit: 2/27/2025       Dear Dr. Inderjit Hendricks, Josh Pulido    Thank you for referring Kevan Queen to me for evaluation. Attached you will find relevant portions of my assessment and plan of care.    If you have questions, please do not hesitate to call me. I look forward to following Kevan Queen along with you.    Sincerely,    Dalia Crum MD    Enclosure    If you would like to receive this communication electronically, please contact [email or web address] or [phone] to request [employer] access.    [employer] is a tool which provides read-only access to select patient information with whom you have a relationship. Its easy to use and provides real time access to review your patients record including encounter summaries, notes, results, and demographic information.    If you feel you have received this communication in error or would no longer like to receive these types of communications, please e-mail [email or web address]

## 2025-02-27 NOTE — PROGRESS NOTES
Date of Implant with Heartmate 3 LVAD: 6/29/22    PATIENT ARRIVED IN CLINIC:  Ambulatory   Accompanied by: Son and Family  Complaints/reason for visit today: recent discharge    Vitals  Temperature, oral:   Temp Readings from Last 1 Encounters:   01/25/25 98.5 °F (36.9 °C) (Oral)     Blood Pressure:   BP Readings from Last 3 Encounters:   01/25/25 (!) 80/0   01/08/25 (!) 80/0   11/21/24 (!) 82/0        VAD Interrogation:      2/27/2025     1:06 PM 1/25/2025     4:22 PM 1/25/2025    11:03 AM   TXP JYO INTERROGATIONS   Type HeartMate3     Flow 4.2     Speed 5100     PI 4     Power (Serna) 3.6     LSL 4700     Pulsatility  Intermittent pulse Intermittent pulse     HCT:   Lab Results   Component Value Date    HCT 29.1 (L) 01/25/2025    HCT 27 (L) 09/25/2024       VAD Assessment  Problems / Issues /Equipment Needs/ Alarms with VAD if any: None noted  VAD Sounds: HM3 Smooth  VAD Binder With Patient: No  Reviewed VAD Numbers In Binder: No  Emergency Equipment With Patient: Yes   Equipment Needs: Yes - see note by RENEE Mitchell  It is medically necessary to ensure patient has properly functioning equipment and wearables to prevent infection, injury or death to patient.     DLES Assessment and Dressing Care:  Appearance of DLES: 2    Antibiotics: YES suppressive  Velour: No  Manual & Visual Inspection Of Driveline: No kinks or tears noted  Stabilization Device In Use: yes, melendez securement device    Heartmate 3 Module Cable:  No yellow exposed and Attempted to unscrew modular cable to ensure it will be able to come lose in the event we ever need to change the modular cable while patient held the driveline in place so it would not move. Modular cable connection able to be unscrewed and re-tightened. Instructed pt to perform this weekly.  Frequency of Dressing Changes: daily & daily kit   Pt In Need Of Management Kits?:yes -   6 Box of daily kit  It is medically necessary to have VAD management kits in order to prevent infection  or to assist in the healing of an infected DLES.    Patient MyChart Questionnaire:        No data to display                 Assessment/ Quality of Life Survery:   Complaints Of Nausea / Vomiting: None noted    Appearance and Frequency Of Stools: normal and formed without blood & daily  Color Of Urine: clear/yellow  Pain: YES Description of Pain:  DLES abd. pain  Coping/Depression/Anxiety: coping okay  Sleep Habits: 6-8 hrs /night  Sleep Aids: None noted  Showering: Yes, reminded to change dressing immediately after drying off  Self- Care I have no problems with self- care  Mobility I have some problems in walking about  Usual Activities I have some problems with performing my usual activities  Activity/Exercise: moving around house   Driving: No.  Additional Comments: N/A    Labs:    Chemistry        Component Value Date/Time     (L) 01/25/2025 0438    K 2.7 (LL) 01/25/2025 0438    CL 88 (L) 01/25/2025 0438    CO2 33 (H) 01/25/2025 0438    BUN 29 (H) 01/25/2025 0438    CREATININE 1.3 01/25/2025 0438     (H) 01/25/2025 0438        Component Value Date/Time    CALCIUM 9.1 01/25/2025 0438    ALKPHOS 209 (H) 01/24/2025 1250    AST 43 (H) 01/24/2025 1250    ALT 10 01/24/2025 1250    BILITOT 1.1 (H) 01/24/2025 1250    ESTGFRAFRICA >60.0 07/27/2022 1229    EGFRNONAA 54.2 (A) 07/27/2022 1229            Magnesium   Date Value Ref Range Status   01/25/2025 2.1 1.6 - 2.6 mg/dL Final       Lab Results   Component Value Date    WBC 9.38 01/25/2025    HGB 8.4 (L) 01/25/2025    HCT 29.1 (L) 01/25/2025    MCV 63 (L) 01/25/2025     (H) 01/25/2025       Lab Results   Component Value Date    INR 1.5 (H) 02/17/2025    INR 1.3 02/07/2025    INR 1.6 (H) 01/28/2025       BNP   Date Value Ref Range Status   01/24/2025 221 (H) 0 - 99 pg/mL Final     Comment:     Values of less than 100 pg/ml are consistent with non-CHF populations.   01/22/2025 463 (H) 0 - 99 pg/mL Final     Comment:     Values of less than 100 pg/ml  are consistent with non-CHF populations.   01/20/2025 526 (H) 0 - 99 pg/mL Final     Comment:     Values of less than 100 pg/ml are consistent with non-CHF populations.       LD   Date Value Ref Range Status   01/25/2025 237 110 - 260 U/L Final     Comment:     Results are increased in hemolyzed samples.   01/24/2025 695 (H) 110 - 260 U/L Final     Comment:     Results are increased in hemolyzed samples.  *Result may be interfered by visible hemolysis     01/23/2025 235 110 - 260 U/L Final     Comment:     Results are increased in hemolyzed samples.       Labs reviewed with patient: YES post appointment when resulted    Medication Reconciliation: per MA.  New Medication Detail Provided: yes  Coumadin Managed by: [facility]    Education: Reviewed driveline care, emergency procedures, how to change the controller, alarms with patient, as well as discussed how to page the VAD coordinator in case of an emergency.   Educated patient/family that chest compressions are allowed in the event they are needed.    Reminded patient/caregiver not to touch their face and to cover their mouth when they cough or sneeze.   Vaccines: Pt informed that we are encouraging all VAD patients to receive  vaccines and boosters. Informed pt that they can take tylenol but should avoid other NSAIDs.       Plans/Needs: Patient seen in clinic for follow up post inpatient stay. Patient is in good spirits. Informed patient about lasix diuretic change. Patient is starting diuretic changes today. Repeat labs next week. Will reassess labs today once result due to prior low K. Patient educated on not sleeping on batteries as he is continuing to. Patient to RTC in 6 months with lipids     Hurricane Season: No

## 2025-02-28 ENCOUNTER — PATIENT MESSAGE (OUTPATIENT)
Dept: TRANSPLANT | Facility: CLINIC | Age: 40
End: 2025-02-28
Payer: MEDICAID

## 2025-03-01 LAB
BACTERIA SPEC AEROBE CULT: ABNORMAL
BACTERIA SPEC ANAEROBE CULT: NORMAL

## 2025-03-03 ENCOUNTER — RESULTS FOLLOW-UP (OUTPATIENT)
Dept: INFECTIOUS DISEASES | Facility: HOSPITAL | Age: 40
End: 2025-03-03

## 2025-03-03 DIAGNOSIS — A49.02 MRSA INFECTION: ICD-10-CM

## 2025-03-03 DIAGNOSIS — T82.7XXA INFECTION ASSOCIATED WITH DRIVELINE OF LEFT VENTRICULAR ASSIST DEVICE (LVAD): Primary | ICD-10-CM

## 2025-03-03 RX ORDER — DOXYCYCLINE 100 MG/1
100 CAPSULE ORAL 2 TIMES DAILY
Qty: 60 CAPSULE | Refills: 2 | Status: SHIPPED | OUTPATIENT
Start: 2025-03-03 | End: 2025-06-01

## 2025-03-05 ENCOUNTER — TELEPHONE (OUTPATIENT)
Dept: INFECTIOUS DISEASES | Facility: CLINIC | Age: 40
End: 2025-03-05
Payer: MEDICAID

## 2025-03-05 NOTE — TELEPHONE ENCOUNTER
----- Message from Med Assistant Rodríguez sent at 3/5/2025  3:05 PM CST -----    ----- Message -----  From: Laura Baldwin MD  Sent: 3/5/2025   1:35 PM CST  To: Suri Michaels MA    Hi, Can you call pt or his mother (Malou) to let them know that based on the cultures from his driveline, I recommend that he stop taking his cefadroxil and start doxycycline 100 mg every 12 hours. A prescription is sent to his pharmacy. Thank you

## 2025-03-05 NOTE — TELEPHONE ENCOUNTER
Spoke with pt and explained that his abx is being changed and his new Rx was sent to his pharmacy. Pt stated e will pick it up today.

## 2025-03-11 ENCOUNTER — LAB VISIT (OUTPATIENT)
Dept: LAB | Facility: HOSPITAL | Age: 40
End: 2025-03-11
Attending: INTERNAL MEDICINE
Payer: MEDICAID

## 2025-03-11 ENCOUNTER — PATIENT MESSAGE (OUTPATIENT)
Dept: TRANSPLANT | Facility: CLINIC | Age: 40
End: 2025-03-11
Payer: MEDICAID

## 2025-03-11 ENCOUNTER — ANTI-COAG VISIT (OUTPATIENT)
Dept: CARDIOLOGY | Facility: CLINIC | Age: 40
End: 2025-03-11
Payer: MEDICAID

## 2025-03-11 ENCOUNTER — TELEPHONE (OUTPATIENT)
Dept: TRANSPLANT | Facility: CLINIC | Age: 40
End: 2025-03-11
Payer: MEDICAID

## 2025-03-11 DIAGNOSIS — Z95.811 LVAD (LEFT VENTRICULAR ASSIST DEVICE) PRESENT: Primary | ICD-10-CM

## 2025-03-11 DIAGNOSIS — Z95.811 LVAD (LEFT VENTRICULAR ASSIST DEVICE) PRESENT: ICD-10-CM

## 2025-03-11 LAB
ALBUMIN SERPL-MCNC: 3.2 G/DL (ref 3.5–5)
ALBUMIN/GLOB SERPL: 0.6 RATIO (ref 1.1–2)
ALP SERPL-CCNC: 213 UNIT/L (ref 40–150)
ALT SERPL-CCNC: 8 UNIT/L (ref 0–55)
ANION GAP SERPL CALC-SCNC: 13 MEQ/L
AST SERPL-CCNC: 22 UNIT/L (ref 5–34)
BILIRUB SERPL-MCNC: 1.9 MG/DL
BNP BLD-MCNC: 802.6 PG/ML
BUN SERPL-MCNC: 15.7 MG/DL (ref 8.9–20.6)
CALCIUM SERPL-MCNC: 8.7 MG/DL (ref 8.4–10.2)
CHLORIDE SERPL-SCNC: 97 MMOL/L (ref 98–107)
CO2 SERPL-SCNC: 25 MMOL/L (ref 22–29)
CREAT SERPL-MCNC: 1.18 MG/DL (ref 0.72–1.25)
CREAT/UREA NIT SERPL: 13
GFR SERPLBLD CREATININE-BSD FMLA CKD-EPI: >60 ML/MIN/1.73/M2
GLOBULIN SER-MCNC: 5.8 GM/DL (ref 2.4–3.5)
GLUCOSE SERPL-MCNC: 121 MG/DL (ref 74–100)
INR PPP: 1.6
POTASSIUM SERPL-SCNC: 3.1 MMOL/L (ref 3.5–5.1)
PROT SERPL-MCNC: 9 GM/DL (ref 6.4–8.3)
PROTHROMBIN TIME: 19.6 SECONDS (ref 12.5–14.5)
SODIUM SERPL-SCNC: 135 MMOL/L (ref 136–145)

## 2025-03-11 PROCEDURE — 93793 ANTICOAG MGMT PT WARFARIN: CPT | Mod: ,,,

## 2025-03-11 PROCEDURE — 80053 COMPREHEN METABOLIC PANEL: CPT

## 2025-03-11 PROCEDURE — 85610 PROTHROMBIN TIME: CPT

## 2025-03-11 PROCEDURE — 83880 ASSAY OF NATRIURETIC PEPTIDE: CPT

## 2025-03-11 PROCEDURE — 36415 COLL VENOUS BLD VENIPUNCTURE: CPT

## 2025-03-11 NOTE — TELEPHONE ENCOUNTER
Labs received and reviewed. K 3.1, spoke with patient mother. States he is taking 10meq daily instead of 30meq. Advised to start taking prescribed dose and will repeat labs next week.

## 2025-03-17 ENCOUNTER — ANTI-COAG VISIT (OUTPATIENT)
Dept: CARDIOLOGY | Facility: CLINIC | Age: 40
End: 2025-03-17
Payer: MEDICAID

## 2025-03-17 ENCOUNTER — LAB VISIT (OUTPATIENT)
Dept: LAB | Facility: HOSPITAL | Age: 40
End: 2025-03-17
Attending: INTERNAL MEDICINE
Payer: MEDICAID

## 2025-03-17 DIAGNOSIS — Z95.811 LVAD (LEFT VENTRICULAR ASSIST DEVICE) PRESENT: ICD-10-CM

## 2025-03-17 DIAGNOSIS — Z95.811 LVAD (LEFT VENTRICULAR ASSIST DEVICE) PRESENT: Primary | ICD-10-CM

## 2025-03-17 LAB
INR PPP: 1.8
PROTHROMBIN TIME: 20.2 SECONDS (ref 12.5–14.5)

## 2025-03-17 PROCEDURE — 93793 ANTICOAG MGMT PT WARFARIN: CPT | Mod: ,,,

## 2025-03-17 PROCEDURE — 85610 PROTHROMBIN TIME: CPT

## 2025-03-17 PROCEDURE — 36415 COLL VENOUS BLD VENIPUNCTURE: CPT

## 2025-03-17 NOTE — PROGRESS NOTES
Spoke with patient mother Malou Dumont regarding recent INR results .  Patient questioned and verified correct dosage . Reported no recent changes .

## 2025-03-17 NOTE — PROGRESS NOTES
[facility]    03/17/2025    Kevan Queen (39 y.o.) is followed by the [facility].      Assessment/Plan:    Kevan Queen presents with a subtherapeutic  INR. Goal INR: 2.0-3.0    Lab Results   Component Value Date    INR 1.8 (H) 03/17/2025    INR 1.6 (H) 03/11/2025    INR 1.3 (H) 02/27/2025       Assessment of patient findings per MA/LPN and chart review:   The following significant findings were found:   none    Recommendation for patient's warfarin regimen:   Due to subtherapeutic INR, patient was instructed to take a booster dose today. Patient to also increase their weekly dose.    Recommended repeat INR in 3 days      Nela Morgan, PharmD, BCPS  Clinical Pharmacist - [facility]  Preferred Contact: Secure Messaging or In Basket Message

## 2025-03-18 NOTE — SUBJECTIVE & OBJECTIVE
Interval History/Significant Events: NAEON. Nitric weaned to 1. Remains on 0.5 of epi. Hep gtt discontinued yesterday given therapeutic on warfarin.     Follow-up For: Procedure(s) (LRB):  CLOSURE, WOUND, STERNUM (N/A)  INSERTION-RIGHT VENTRICULAR ASSIST DEVICE (Right)  APPLICATION, WOUND VAC (N/A)  IRRIGATION, MEDIASTINUM    Post-Operative Day: 8 Days Post-Op    Objective:     Vital Signs (Most Recent):  Temp: 98 °F (36.7 °C) (07/08/22 0715)  Pulse: 99 (07/08/22 0715)  Resp: (!) 61 (07/08/22 0715)  BP: (!) 80/0 (07/08/22 0715)  SpO2: 98 % (07/08/22 0500)   Vital Signs (24h Range):  Temp:  [98 °F (36.7 °C)-98.7 °F (37.1 °C)] 98 °F (36.7 °C)  Pulse:  [] 99  Resp:  [7-61] 61  SpO2:  [95 %-99 %] 98 %  BP: (78-84)/(0) 80/0  Arterial Line BP: ()/(60-90) 80/71     Weight: 97.5 kg (214 lb 15.2 oz)  Body mass index is 26.87 kg/m².      Intake/Output Summary (Last 24 hours) at 7/8/2022 0756  Last data filed at 7/8/2022 0600  Gross per 24 hour   Intake 1558.24 ml   Output 3650 ml   Net -2091.76 ml       Physical Exam  Vitals reviewed.   Constitutional:       General: He is not in acute distress.  HENT:      Head: Normocephalic and atraumatic.      Nose: Nose normal.      Mouth/Throat:      Mouth: Mucous membranes are moist.   Eyes:      Pupils: Pupils are equal, round, and reactive to light.   Neck:      Comments:     Cardiovascular:      Rate and Rhythm: Normal rate and regular rhythm.      Pulses: Normal pulses.      Comments: S/p chest closure. Incision c/d/I with iodine gauze    LVAD speed 5300  Flows ~4L    L brachial art line      Pulmonary:      Effort: Pulmonary effort is normal. No respiratory distress.      Comments: On 4L NC  Abdominal:      General: Abdomen is flat. There is no distension.      Palpations: Abdomen is soft.   Musculoskeletal:      Comments: IABP site at left fem with dressing in place.    R PICC   Skin:     General: Skin is warm and dry.      Capillary Refill: Capillary refill takes  Pt tolerated exercises see scanned report.   less than 2 seconds.   Neurological:      General: No focal deficit present.      Mental Status: He is alert and oriented to person, place, and time.     Lines/Drains/Airways       Peripherally Inserted Central Catheter Line  Duration             PICC Triple Lumen 07/05/22 1554 right brachial 2 days              Arterial Line  Duration             Arterial Line 06/29/22 0832 Left Brachial 8 days              Line  Duration                  VAD 06/29/22 1115 Left ventricular assist device HeartMate 3 8 days              Peripheral Intravenous Line  Duration                  Peripheral IV - Single Lumen 07/08/22 0300 20 G Anterior;Right Forearm <1 day                    Significant Labs:    CBC/Anemia Profile:  Recent Labs   Lab 07/07/22 0319 07/08/22  0408   WBC 21.04* 17.30*   HGB 7.1* 7.1*   HCT 22.8* 22.6*    348   MCV 88 88   RDW 16.0* 16.0*        Chemistries:  Recent Labs   Lab 07/07/22 0319 07/07/22  1607 07/07/22  2215 07/08/22  0408   *  --   --  131*   K 3.6  3.6  3.6 4.3 4.6 3.9  4.0   CL 88*  --   --  88*   CO2 28  --   --  27   BUN 34*  --   --  38*   CREATININE 1.3  --   --  1.5*   CALCIUM 8.7  --   --  9.3   ALBUMIN 3.2*  --   --  3.2*   PROT 6.8  --   --  7.0   BILITOT 1.5*  --   --  1.5*   ALKPHOS 146*  --   --  161*   *  --   --  157*   AST 71*  --   --  59*   MG 2.5  2.5 2.5 2.3 2.3   PHOS 3.3  --   --  3.4       All pertinent labs within the past 24 hours have been reviewed.    Significant Imaging:  I have reviewed all pertinent imaging results/findings within the past 24 hours.

## 2025-03-21 ENCOUNTER — ANTI-COAG VISIT (OUTPATIENT)
Dept: CARDIOLOGY | Facility: CLINIC | Age: 40
End: 2025-03-21
Payer: MEDICAID

## 2025-03-21 ENCOUNTER — LAB VISIT (OUTPATIENT)
Dept: LAB | Facility: HOSPITAL | Age: 40
End: 2025-03-21
Attending: INTERNAL MEDICINE
Payer: MEDICAID

## 2025-03-21 DIAGNOSIS — Z95.811 LVAD (LEFT VENTRICULAR ASSIST DEVICE) PRESENT: Primary | ICD-10-CM

## 2025-03-21 DIAGNOSIS — Z95.811 LVAD (LEFT VENTRICULAR ASSIST DEVICE) PRESENT: ICD-10-CM

## 2025-03-21 LAB
INR PPP: 1.8
PROTHROMBIN TIME: 20.7 SECONDS (ref 12.5–14.5)

## 2025-03-21 PROCEDURE — 85610 PROTHROMBIN TIME: CPT

## 2025-03-21 PROCEDURE — 36415 COLL VENOUS BLD VENIPUNCTURE: CPT

## 2025-03-21 NOTE — PROGRESS NOTES
[facility]    03/21/2025    Kevan Queen (39 y.o.) is followed by the [facility].      Assessment/Plan:    Kevan Queen presents with a subtherapeutic  INR. Goal INR: 2.0-3.0    Lab Results   Component Value Date    INR 1.8 (H) 03/21/2025    INR 1.8 (H) 03/17/2025    INR 1.6 (H) 03/11/2025       Assessment of patient findings per MA/LPN and chart review:   The following significant findings were found:   Patient's mom does report that he ate spinach a few times over the past week    Recommendation for patient's warfarin regimen:   Due to subtherapeutic INR, patient was instructed to take a booster dose today. Patient to also increase their weekly dose.    Recommended repeat INR in 4 days      Nela Morgan PharmD, BCPS  Clinical Pharmacist - [facility]  Preferred Contact: Secure Messaging or In Basket Message

## 2025-03-21 NOTE — PROGRESS NOTES
3/21- Pt's mom questioned and confirmed correct dose. Pt ate spinach a few times this week but his mom denies all other changes.

## 2025-04-04 ENCOUNTER — CLINICAL SUPPORT (OUTPATIENT)
Dept: OPHTHALMOLOGY | Facility: CLINIC | Age: 40
End: 2025-04-04
Payer: MEDICAID

## 2025-04-04 ENCOUNTER — OFFICE VISIT (OUTPATIENT)
Dept: OPHTHALMOLOGY | Facility: CLINIC | Age: 40
End: 2025-04-04
Payer: MEDICAID

## 2025-04-04 ENCOUNTER — PATIENT MESSAGE (OUTPATIENT)
Dept: TRANSPLANT | Facility: CLINIC | Age: 40
End: 2025-04-04
Payer: MEDICAID

## 2025-04-04 DIAGNOSIS — H43.813 VITREOUS DEGENERATION OF BOTH EYES: ICD-10-CM

## 2025-04-04 DIAGNOSIS — Q14.1 CONGENITAL HYPERTROPHY OF RETINAL PIGMENT EPITHELIUM OF LEFT EYE: ICD-10-CM

## 2025-04-04 DIAGNOSIS — H04.123 DRY EYE SYNDROME OF BOTH EYES: ICD-10-CM

## 2025-04-04 DIAGNOSIS — E11.9 DIABETES MELLITUS TYPE 2 WITHOUT RETINOPATHY: Primary | ICD-10-CM

## 2025-04-04 PROCEDURE — 2023F DILAT RTA XM W/O RTNOPTHY: CPT | Mod: CPTII,,, | Performed by: OPHTHALMOLOGY

## 2025-04-04 PROCEDURE — 92134 CPTRZ OPH DX IMG PST SGM RTA: CPT | Mod: PBBFAC | Performed by: OPHTHALMOLOGY

## 2025-04-04 PROCEDURE — 92202 OPSCPY EXTND ON/MAC DRAW: CPT | Mod: S$PBB,,, | Performed by: OPHTHALMOLOGY

## 2025-04-04 PROCEDURE — 1160F RVW MEDS BY RX/DR IN RCRD: CPT | Mod: CPTII,,, | Performed by: OPHTHALMOLOGY

## 2025-04-04 PROCEDURE — 1159F MED LIST DOCD IN RCRD: CPT | Mod: CPTII,,, | Performed by: OPHTHALMOLOGY

## 2025-04-04 PROCEDURE — 99214 OFFICE O/P EST MOD 30 MIN: CPT | Mod: S$PBB,,, | Performed by: OPHTHALMOLOGY

## 2025-04-04 PROCEDURE — 99211 OFF/OP EST MAY X REQ PHY/QHP: CPT | Mod: PBBFAC,25 | Performed by: OPHTHALMOLOGY

## 2025-04-04 PROCEDURE — 92202 OPSCPY EXTND ON/MAC DRAW: CPT | Mod: 59,PBBFAC | Performed by: OPHTHALMOLOGY

## 2025-04-04 PROCEDURE — 99999 PR PBB SHADOW E&M-EST. PATIENT-LVL I: CPT | Mod: PBBFAC,,, | Performed by: OPHTHALMOLOGY

## 2025-04-06 NOTE — PROGRESS NOTES
Subjective:   Patient ID:  Kevan Queen is a 40 y.o. male who presents for LVAD followup visit.    Implant date: 06/29/2022       VAD Interrogation:  TXP JOY INTERROGATIONS 8/4/2022 7/27/2022 7/27/2022   Type HeartMate3 - -   Flow 3.4 - -   Speed 5100 - -   PI 4.7 - -   Power (Serna) 3.5 - -   LSL 4700 - -   Pulsatility No Pulse Intermittent pulse Intermittent pulse        HPI  Patient is a 41 yo male with BIV systolic heart failure, on home milrinone .375mcg/kg/min who presented 06/13/22 for LVAD implant. Patient underwent LVAD implant with Dr. Paige 06/29/22. 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 Completed IV abx for staph epi infection. Weaned off dobutamine but restarted due to RVF. Of note, he did turn off his pump for several seconds due to accidental disconnect. Patient transitioned to milrinone 0.25mcg/kg/min, due to dobutamine shortage. Of note, patient with seizure history on keppra. More recent admission last year led to removal of picc and discontinuation of milrinone. Additionally, had ICD but believe he has not had it placed back, removed for infection. Most recently was in the hospital in January where he had admisison for ADHF and MSSA bacteremia. Diuresed 26 Liters of fluid, and midline reception of abx.     He has been doing okay overall, here with his mother, moved in with her and has been much more compliant overall. Has some volume, and has a lot of ithcing/rash that he is not sure why, but no erythema, not truly visible, do see old skin marks of where he has scratched. Additionally patient has no bleeding, no bright red blood per rectum, melena, no GI symptoms at all other than getting full a little quickly/having some bloating.  NYHA FC III.       Review of Systems   Constitutional: Positive for malaise/fatigue. Negative for chills, decreased appetite, diaphoresis, fever, weight gain and weight loss.   Eyes:  Negative for visual  "disturbance.   Cardiovascular:  Negative for chest pain (at incision site), claudication, cyanosis, dyspnea on exertion, irregular heartbeat, leg swelling, near-syncope, orthopnea, palpitations, paroxysmal nocturnal dyspnea and syncope.   Respiratory:  Positive for shortness of breath. Negative for cough, hemoptysis, sleep disturbances due to breathing, snoring, sputum production and wheezing.    Hematologic/Lymphatic: Negative for adenopathy and bleeding problem. Bruises/bleeds easily.   Skin:  Negative for color change, poor wound healing, rash, skin cancer and suspicious lesions.   Musculoskeletal:  Negative for back pain, falls, gout, joint pain and muscle weakness.   Gastrointestinal:  Negative for bloating, abdominal pain, anorexia, constipation, diarrhea, heartburn, hematemesis, hematochezia, hemorrhoids, melena, nausea and vomiting.   Genitourinary:  Negative for nocturia and urgency.   Neurological:  Negative for excessive daytime sleepiness, dizziness, focal weakness, headaches, light-headedness, paresthesias, tremors and weakness.   Psychiatric/Behavioral:  Negative for depression and memory loss. The patient does not have insomnia and is not nervous/anxious.        Objective:   Blood pressure (!) 80/0, temperature 98.4 °F (36.9 °C), temperature source Oral, height 6' 3" (1.905 m), weight 69.9 kg (154 lb).body mass index is 19.25 kg/m².      Physical Exam  Vitals and nursing note reviewed.   Constitutional:       General: He is not in acute distress.     Appearance: He is well-developed. He is not diaphoretic.   HENT:      Head: Normocephalic and atraumatic.      Nose: Nose normal.      Mouth/Throat:      Pharynx: No oropharyngeal exudate.   Eyes:      General: No scleral icterus.     Conjunctiva/sclera: Conjunctivae normal.      Pupils: Pupils are equal, round, and reactive to light.   Neck:      Thyroid: No thyromegaly.      Vascular: JVD present.      Trachea: No tracheal deviation.   Cardiovascular: "      Rate and Rhythm: Normal rate and regular rhythm.      Heart sounds: Normal heart sounds, S1 normal and S2 normal. No murmur heard.     No friction rub. No gallop.      Comments: Normal LVAD HM3 hum throughout precordium. DLES 2  Pulmonary:      Effort: Pulmonary effort is normal. No respiratory distress.      Breath sounds: Normal breath sounds. No wheezing or rales.   Chest:      Chest wall: No tenderness.   Abdominal:      General: Bowel sounds are normal. There is no distension.      Palpations: Abdomen is soft. There is no mass.      Tenderness: There is no abdominal tenderness. There is no guarding or rebound.   Musculoskeletal:         General: No tenderness. Normal range of motion.      Cervical back: Normal range of motion and neck supple.   Skin:     General: Skin is warm and dry.      Coloration: Skin is not pale.      Findings: No erythema or rash.   Neurological:      Mental Status: He is alert and oriented to person, place, and time.      Coordination: Coordination normal.   Psychiatric:         Behavior: Behavior normal.         Thought Content: Thought content normal.         Judgment: Judgment normal.       Labs:  Lab Results   Component Value Date    .6 (H) 03/11/2025    BNP 1,273 (H) 02/27/2025     (L) 03/11/2025     (L) 02/27/2025    K 3.1 (L) 03/11/2025    K 2.7 (LL) 02/27/2025    MG 1.6 02/27/2025    CL 97 (L) 03/11/2025     02/27/2025    CO2 25 03/11/2025    CO2 22 (L) 02/27/2025    BUN 15.7 03/11/2025    BUN 12 02/27/2025    CREATININE 1.18 03/11/2025    CREATININE 1.3 02/27/2025     (H) 02/27/2025    HGBA1C 10.6 (H) 12/31/2024    AST 22 03/11/2025    AST 32 02/27/2025    ALT 8 03/11/2025    ALT 9 (L) 02/27/2025    ALBUMIN 3.2 (L) 03/11/2025    ALBUMIN 2.8 (L) 02/27/2025    PROT 8.4 02/27/2025    BILITOT 1.9 (H) 03/11/2025    BILITOT 1.2 (H) 02/27/2025    CHOL 78 (L) 02/27/2025    HDL 28 (L) 02/27/2025    LDLCALC 36.2 (L) 02/27/2025    TRIG 69 02/27/2025        Magnesium   Date Value Ref Range Status   02/27/2025 1.6 1.6 - 2.6 mg/dL Final       Lab Results   Component Value Date    WBC 9.34 02/27/2025    HGB 7.8 (L) 02/27/2025    HCT 28.8 (L) 02/27/2025    MCV 63 (L) 02/27/2025     02/27/2025       Lab Results   Component Value Date    INR 1.8 (H) 03/21/2025    INR 1.8 (H) 03/17/2025    INR 1.6 (H) 03/11/2025       BNP   Date Value Ref Range Status   02/27/2025 1,273 (H) 0 - 99 pg/mL Final     Comment:     Values of less than 100 pg/ml are consistent with non-CHF populations.   01/24/2025 221 (H) 0 - 99 pg/mL Final     Comment:     Values of less than 100 pg/ml are consistent with non-CHF populations.   01/22/2025 463 (H) 0 - 99 pg/mL Final     Comment:     Values of less than 100 pg/ml are consistent with non-CHF populations.     Natriuretic Peptide   Date Value Ref Range Status   03/11/2025 802.6 (H) <=100.0 pg/mL Final       LD   Date Value Ref Range Status   02/27/2025 233 110 - 260 U/L Final     Comment:     Results are increased in hemolyzed samples.   01/25/2025 237 110 - 260 U/L Final     Comment:     Results are increased in hemolyzed samples.   01/24/2025 695 (H) 110 - 260 U/L Final     Comment:     Results are increased in hemolyzed samples.  *Result may be interfered by visible hemolysis           Assessment:      1. Chronic combined systolic and diastolic heart failure    2. LVAD (left ventricular assist device) present    3. CHF (NYHA class IV, ACC/AHA stage D)    4. Right heart failure secondary to left heart failure-post LVAD surgery    5. 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 Keeping volume and weight stable overall, per mom and patient. Increase lasix to 80mg po BID, repeat labs 1 week.  Rvf- furosemide, volume management, inspra 50mg  GDMT- MRA, SGLT2i, no ace/arb due to josette, pressure, and no bb due to  RVF  Patient is now NYHA III  Recommend 2 gram sodium restriction and 1500cc fluid restriction.  Encourage physical activity with graded exercise program.  Requested patient to weigh themselves daily, and to notify us if their weight increases by more than 3 lbs in 1 day or 5 lbs in 1 week.     Patient advised that it is recommended that all patients, and their close contacts and household members receive Covid vaccination.    Listed for transplant: No

## 2025-04-06 NOTE — PROCEDURES
2/27/2025     1:06 PM 1/25/2025     4:22 PM 1/25/2025    11:03 AM 1/25/2025     7:20 AM 1/25/2025     4:30 AM 1/25/2025    12:00 AM 1/24/2025     8:25 PM   TXP JOY INTERROGATIONS   Type HeartMate3         Flow 4.2         Speed 5100         PI 4         Power (Serna) 3.6         LSL 4700         Pulsatility  Intermittent pulse Intermittent pulse Intermittent pulse Intermittent pulse Intermittent pulse Intermittent pulse   }

## 2025-04-07 PROBLEM — H04.123 DRY EYE SYNDROME OF BOTH EYES: Status: ACTIVE | Noted: 2025-04-07

## 2025-04-07 PROBLEM — Q14.1 CONGENITAL HYPERTROPHY OF RETINAL PIGMENT EPITHELIUM OF LEFT EYE: Status: ACTIVE | Noted: 2025-04-07

## 2025-04-07 PROBLEM — E11.9 DIABETES MELLITUS TYPE 2 WITHOUT RETINOPATHY: Status: ACTIVE | Noted: 2025-04-07

## 2025-04-07 PROBLEM — H43.813 VITREOUS DEGENERATION OF BOTH EYES: Status: ACTIVE | Noted: 2025-04-07

## 2025-04-07 NOTE — PROGRESS NOTES
HPI    Er F/u     Pt states his vision is very blurry   He reports seeing double from time time to time.     +Floaters  -Flashes   -Pain     Hemoglobin A1C       Date                     Value               Ref Range             Status                12/31/2024               10.6 (H)            4.0 - 5.6 %                  08/25/2024               10.3 (H)            4.0 - 5.6 %         Final                         04/26/2024               >14.0 (H)           4.0 - 5.6 %           Final                Last edited by Liliana Jones on 4/4/2025  2:07 PM.         A/P    ICD-10-CM ICD-9-CM   1. Diabetes mellitus type 2 without retinopathy  E11.9 250.00   2. 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 Vitreous degeneration of both eyes  H43.813 379.21   4. Dry eye syndrome of both eyes  H04.123 375.15       1. Diabetes mellitus type 2 without retinopathy  Inpatient f/u for retina check - Recent notes reviewed  PCP - Rosio Armendariz FNP - Recent notes reviewed  12/31/2024  10.6  A1C     Exam notable for no DR or DME OU  Plan: Observation  Recommend good blood pressure control, tight blood glucose control, and good cholesterol control     2. 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 Vitreous degeneration of both eyes  No RT/RD  Plan: Observation      4. Dry eye syndrome of both eyes  Mild dry eye  Rec ATs prn     RTC 1 year DFE/OCTm OU        I saw and examined the patient and reviewed in detail the findings documented. The final examination findings, image interpretations which have been independently interpreted, and plan as documented in the record represent my personal judgment and conclusions.    Charly Yusuf MD  Vitreoretinal Surgery   [facility]

## 2025-04-08 ENCOUNTER — ANTI-COAG VISIT (OUTPATIENT)
Dept: CARDIOLOGY | Facility: CLINIC | Age: 40
End: 2025-04-08
Payer: MEDICAID

## 2025-04-08 ENCOUNTER — TELEPHONE (OUTPATIENT)
Dept: TRANSPLANT | Facility: CLINIC | Age: 40
End: 2025-04-08
Payer: MEDICAID

## 2025-04-08 ENCOUNTER — LAB VISIT (OUTPATIENT)
Dept: LAB | Facility: HOSPITAL | Age: 40
End: 2025-04-08
Attending: INTERNAL MEDICINE
Payer: MEDICAID

## 2025-04-08 DIAGNOSIS — Z95.811 LVAD (LEFT VENTRICULAR ASSIST DEVICE) PRESENT: Primary | ICD-10-CM

## 2025-04-08 DIAGNOSIS — Z95.811 LVAD (LEFT VENTRICULAR ASSIST DEVICE) PRESENT: ICD-10-CM

## 2025-04-08 LAB
ALBUMIN SERPL-MCNC: 3 G/DL (ref 3.5–5)
ALBUMIN/GLOB SERPL: 0.5 RATIO (ref 1.1–2)
ALP SERPL-CCNC: 238 UNIT/L (ref 40–150)
ALT SERPL-CCNC: 21 UNIT/L (ref 0–55)
ANION GAP SERPL CALC-SCNC: 10 MEQ/L
AST SERPL-CCNC: 37 UNIT/L (ref 11–45)
BASOPHILS # BLD AUTO: 0.03 X10(3)/MCL
BASOPHILS NFR BLD AUTO: 0.4 %
BILIRUB SERPL-MCNC: 1.7 MG/DL
BNP BLD-MCNC: 585.5 PG/ML
BUN SERPL-MCNC: 15.4 MG/DL (ref 8.9–20.6)
CALCIUM SERPL-MCNC: 8.9 MG/DL (ref 8.4–10.2)
CHLORIDE SERPL-SCNC: 95 MMOL/L (ref 98–107)
CO2 SERPL-SCNC: 25 MMOL/L (ref 22–29)
CREAT SERPL-MCNC: 1.16 MG/DL (ref 0.72–1.25)
CREAT/UREA NIT SERPL: 13
CRP SERPL-MCNC: 82.1 MG/L
EOSINOPHIL # BLD AUTO: 0.01 X10(3)/MCL (ref 0–0.9)
EOSINOPHIL NFR BLD AUTO: 0.1 %
ERYTHROCYTE [DISTWIDTH] IN BLOOD BY AUTOMATED COUNT: 23.5 % (ref 11.5–17)
GFR SERPLBLD CREATININE-BSD FMLA CKD-EPI: >60 ML/MIN/1.73/M2
GLOBULIN SER-MCNC: 6.3 GM/DL (ref 2.4–3.5)
GLUCOSE SERPL-MCNC: 229 MG/DL (ref 74–100)
HCT VFR BLD AUTO: 36.9 % (ref 42–52)
HGB BLD-MCNC: 9.8 G/DL (ref 14–18)
IMM GRANULOCYTES # BLD AUTO: 0.03 X10(3)/MCL (ref 0–0.04)
IMM GRANULOCYTES NFR BLD AUTO: 0.4 %
INR PPP: 1.9
LYMPHOCYTES # BLD AUTO: 1.36 X10(3)/MCL (ref 0.6–4.6)
LYMPHOCYTES NFR BLD AUTO: 16.8 %
MCH RBC QN AUTO: 16.4 PG (ref 27–31)
MCHC RBC AUTO-ENTMCNC: 26.6 G/DL (ref 33–36)
MCV RBC AUTO: 61.8 FL (ref 80–94)
MONOCYTES # BLD AUTO: 0.66 X10(3)/MCL (ref 0.1–1.3)
MONOCYTES NFR BLD AUTO: 8.2 %
NEUTROPHILS # BLD AUTO: 5.99 X10(3)/MCL (ref 2.1–9.2)
NEUTROPHILS NFR BLD AUTO: 74.1 %
NRBC BLD AUTO-RTO: 0 %
PLATELET # BLD AUTO: 277 X10(3)/MCL (ref 130–400)
PLATELETS.RETICULATED NFR BLD AUTO: 2.7 % (ref 0.9–11.2)
PMV BLD AUTO: 8.8 FL (ref 7.4–10.4)
POTASSIUM SERPL-SCNC: 3.1 MMOL/L (ref 3.5–5.1)
PROT SERPL-MCNC: 9.3 GM/DL (ref 6.4–8.3)
PROTHROMBIN TIME: 21.5 SECONDS (ref 12.5–14.5)
RBC # BLD AUTO: 5.97 X10(6)/MCL (ref 4.7–6.1)
SODIUM SERPL-SCNC: 130 MMOL/L (ref 136–145)
WBC # BLD AUTO: 8.08 X10(3)/MCL (ref 4.5–11.5)

## 2025-04-08 PROCEDURE — 83880 ASSAY OF NATRIURETIC PEPTIDE: CPT

## 2025-04-08 PROCEDURE — 86140 C-REACTIVE PROTEIN: CPT

## 2025-04-08 PROCEDURE — 85610 PROTHROMBIN TIME: CPT

## 2025-04-08 PROCEDURE — 85025 COMPLETE CBC W/AUTO DIFF WBC: CPT

## 2025-04-08 PROCEDURE — 80053 COMPREHEN METABOLIC PANEL: CPT

## 2025-04-08 PROCEDURE — 36415 COLL VENOUS BLD VENIPUNCTURE: CPT

## 2025-04-08 PROCEDURE — 93793 ANTICOAG MGMT PT WARFARIN: CPT | Mod: ,,,

## 2025-04-08 NOTE — TELEPHONE ENCOUNTER
1444: patient and mom called back.  State headaches have been coming and going for past few weeks. Vary in intensity from 2-10, occasionally with blurred vision. Reports improvement in pain with acetaminophen. Advised to speak with PCP or neurology regarding ongoing h/a's. Reports decreased appetite with intermittent nausea for past several weeks. Denies shortness of breath, swelling, chest pain. Denies new or worsening drainage from DLES. State they just left the lab. Chart reviewed, PT/INR drawn. Requested patient return to lab for further testing. State they will go after picking up his son from school. Advised that if patient develops cough, shortness of breath, dyspnea on exertion or chest pain they should page the on call VAD Coordinator immediately. Patient verbalized understanding.             1406: Attempting to return call. Left message for call back.        ----- Message from Med Assistant Broderick sent at 4/8/2025 10:56 AM CDT -----    ----- Message -----  From: Almita Gaxiola  Sent: 4/8/2025  10:30 AM CDT  To: Skyla Gómez V Staff    Who called: Mom  What is the request in detail: pt is having headaches and is losing a lot of weight but he is eating mom wants to know what to do to help him  Can the clinic reply by [facility]? No  Would the patient rather a call back or a response via [facility]? Call back  Best call back number: [phone] Additional Information:

## 2025-04-09 ENCOUNTER — RESULTS FOLLOW-UP (OUTPATIENT)
Dept: TRANSPLANT | Facility: CLINIC | Age: 40
End: 2025-04-09

## 2025-04-09 ENCOUNTER — TELEPHONE (OUTPATIENT)
Dept: TRANSPLANT | Facility: CLINIC | Age: 40
End: 2025-04-09
Payer: MEDICAID

## 2025-04-09 NOTE — TELEPHONE ENCOUNTER
Called patient's mom to discuss, no answer, no VM available.     ----- Message from Laura Baldwin MD sent at 4/9/2025  1:02 PM CDT -----  Thanks for the update, would continue doxycycline. If there's drainage from his DLES, recommend cultures at his next visit.   ----- Message -----  From: Shena Turpin RN  Sent: 4/9/2025  10:53 AM CDT  To: Dalia Crum MD; Laura Baldwin MD; #    Hi Laura and Dalia   Kevan's mom called yesterday reporting a decreased appetite with intermittent nausea for past several weeks and headaches. Denies new or worsening drainage from DLES. He went to lab for us.. His CRP   is elevated at 82, no leukocytosis. ALP at 238, he has ran high for some time but it's trended up. He is currently on doxy 100 BID.   Anything recs for him?   ----- Message -----  From: Lab, Background User  Sent: 4/8/2025   2:49 PM CDT  To: Shena Turpin, RN

## 2025-04-09 NOTE — PROGRESS NOTES
Norbert Sanchez and Dalia   Kevan's mom called yesterday reporting a decreased appetite with intermittent nausea for past several weeks and headaches. Denies new or worsening drainage from DLES. He went to lab for us.. His CRP is elevated at 82, no leukocytosis. ALP at 238, he has ran high for some time but it's trended up. He is currently on doxy 100 BID.   Anything recs for him?

## 2025-04-11 ENCOUNTER — PATIENT MESSAGE (OUTPATIENT)
Dept: TRANSPLANT | Facility: CLINIC | Age: 40
End: 2025-04-11
Payer: MEDICAID

## 2025-04-16 ENCOUNTER — LAB VISIT (OUTPATIENT)
Dept: LAB | Facility: HOSPITAL | Age: 40
End: 2025-04-16
Attending: INTERNAL MEDICINE
Payer: MEDICAID

## 2025-04-16 ENCOUNTER — ANTI-COAG VISIT (OUTPATIENT)
Dept: CARDIOLOGY | Facility: CLINIC | Age: 40
End: 2025-04-16
Payer: MEDICAID

## 2025-04-16 DIAGNOSIS — Z95.811 LVAD (LEFT VENTRICULAR ASSIST DEVICE) PRESENT: Primary | ICD-10-CM

## 2025-04-16 DIAGNOSIS — Z95.811 LVAD (LEFT VENTRICULAR ASSIST DEVICE) PRESENT: ICD-10-CM

## 2025-04-16 LAB
INR PPP: 2.1
PROTHROMBIN TIME: 23.6 SECONDS (ref 12.5–14.5)

## 2025-04-16 PROCEDURE — 85610 PROTHROMBIN TIME: CPT

## 2025-04-16 PROCEDURE — 36415 COLL VENOUS BLD VENIPUNCTURE: CPT

## 2025-04-25 ENCOUNTER — PATIENT MESSAGE (OUTPATIENT)
Dept: TRANSPLANT | Facility: CLINIC | Age: 40
End: 2025-04-25
Payer: MEDICAID

## 2025-05-07 ENCOUNTER — ANTI-COAG VISIT (OUTPATIENT)
Dept: CARDIOLOGY | Facility: CLINIC | Age: 40
End: 2025-05-07
Payer: MEDICAID

## 2025-05-07 ENCOUNTER — LAB VISIT (OUTPATIENT)
Dept: LAB | Facility: HOSPITAL | Age: 40
End: 2025-05-07
Attending: INTERNAL MEDICINE
Payer: MEDICAID

## 2025-05-07 DIAGNOSIS — Z95.811 LVAD (LEFT VENTRICULAR ASSIST DEVICE) PRESENT: ICD-10-CM

## 2025-05-07 DIAGNOSIS — Z95.811 LVAD (LEFT VENTRICULAR ASSIST DEVICE) PRESENT: Primary | ICD-10-CM

## 2025-05-07 LAB
INR PPP: 1.5
PROTHROMBIN TIME: 18.4 SECONDS (ref 12.5–14.5)

## 2025-05-07 PROCEDURE — 85610 PROTHROMBIN TIME: CPT

## 2025-05-07 PROCEDURE — 36415 COLL VENOUS BLD VENIPUNCTURE: CPT

## 2025-05-07 PROCEDURE — 93793 ANTICOAG MGMT PT WARFARIN: CPT | Mod: ,,,

## 2025-05-07 NOTE — PROGRESS NOTES
5/7- Pt questioned and confirmed correct dose. Pt had nausea on 5/6/25 and 5/7/25 and his body has been aching for the past two days. He also been having headaches and took tylenol last night for it, and he was really hot yesterday as well as if he had a fever. Pt has been drinking smoothies with a multivitamin and whey powder for the past few days as well and has been fatigued. Pt's mom denies all other changes.

## 2025-05-13 ENCOUNTER — PATIENT MESSAGE (OUTPATIENT)
Dept: TRANSPLANT | Facility: CLINIC | Age: 40
End: 2025-05-13
Payer: MEDICAID

## 2025-05-19 ENCOUNTER — PATIENT MESSAGE (OUTPATIENT)
Dept: TRANSPLANT | Facility: CLINIC | Age: 40
End: 2025-05-19
Payer: MEDICAID

## 2025-05-21 ENCOUNTER — PATIENT MESSAGE (OUTPATIENT)
Dept: TRANSPLANT | Facility: CLINIC | Age: 40
End: 2025-05-21
Payer: MEDICAID

## 2025-06-02 ENCOUNTER — LAB VISIT (OUTPATIENT)
Dept: LAB | Facility: HOSPITAL | Age: 40
End: 2025-06-02
Attending: INTERNAL MEDICINE
Payer: MEDICAID

## 2025-06-02 ENCOUNTER — ANTI-COAG VISIT (OUTPATIENT)
Dept: CARDIOLOGY | Facility: CLINIC | Age: 40
End: 2025-06-02
Payer: MEDICAID

## 2025-06-02 DIAGNOSIS — Z95.811 LVAD (LEFT VENTRICULAR ASSIST DEVICE) PRESENT: Primary | ICD-10-CM

## 2025-06-02 DIAGNOSIS — Z95.811 LVAD (LEFT VENTRICULAR ASSIST DEVICE) PRESENT: ICD-10-CM

## 2025-06-02 LAB
INR PPP: 1.5
PROTHROMBIN TIME: 17.9 SECONDS (ref 12.5–14.5)

## 2025-06-02 PROCEDURE — 36415 COLL VENOUS BLD VENIPUNCTURE: CPT

## 2025-06-02 PROCEDURE — 93793 ANTICOAG MGMT PT WARFARIN: CPT | Mod: ,,,

## 2025-06-02 PROCEDURE — 85610 PROTHROMBIN TIME: CPT

## 2025-06-09 ENCOUNTER — PATIENT MESSAGE (OUTPATIENT)
Dept: TRANSPLANT | Facility: CLINIC | Age: 40
End: 2025-06-09
Payer: MEDICAID

## 2025-06-13 ENCOUNTER — PATIENT MESSAGE (OUTPATIENT)
Dept: CARDIOTHORACIC SURGERY | Facility: CLINIC | Age: 40
End: 2025-06-13
Payer: MEDICAID

## 2025-06-13 DIAGNOSIS — Z95.811 LVAD (LEFT VENTRICULAR ASSIST DEVICE) PRESENT: Primary | ICD-10-CM

## 2025-06-20 ENCOUNTER — OFFICE VISIT (OUTPATIENT)
Dept: INFECTIOUS DISEASES | Facility: CLINIC | Age: 40
End: 2025-06-20
Payer: MEDICAID

## 2025-06-20 DIAGNOSIS — A49.02 MRSA INFECTION: Primary | ICD-10-CM

## 2025-06-20 DIAGNOSIS — T82.7XXA INFECTION ASSOCIATED WITH DRIVELINE OF LEFT VENTRICULAR ASSIST DEVICE (LVAD): ICD-10-CM

## 2025-06-20 RX ORDER — DOXYCYCLINE 100 MG/1
100 CAPSULE ORAL 2 TIMES DAILY
Qty: 60 CAPSULE | Refills: 2 | Status: SHIPPED | OUTPATIENT
Start: 2025-06-20 | End: 2025-09-18

## 2025-06-20 NOTE — PROGRESS NOTES
Audio Only Telehealth Visit     The patient location is: home  The chief complaint leading to consultation is: f/u  Visit type: Virtual visit with audio only (telephone)  Total time spent in medical discussion with patient: 11 minutes  Total time spent on date of the encounter:20 minutes       The reason for the audio only service rather than synchronous audio and video virtual visit was related to technical difficulties or patient preference/necessity.       Each patient to whom I provide medical services by telemedicine is:  (1) informed of the relationship between the physician and patient and the respective role of any other health care provider with respect to management of the patient; and (2) notified that they may decline to receive medical services by telemedicine and may withdraw from such care at any time. Patient verbally consented to receive this service via voice-only telephone call.       HPI:      40 yo male with LVAD c/b DLESI and prior MSSA bacteremia here for follow up. Since last seen, he had LVAD swabbed earlier this year that grew MRSA. He was switched to doxycycline and has been tolerating it without issues. Also spoke with his mom who states that when she has changed his LVAD dressing, no significant drainage or odor, has scant brown on his bandages. No fevers or chills. Pt reports missing a few doses of his abx. Unable to logon to skinny - he states he got a new phone and was unable to download skinny.        Assessment and plan:      MRSA LVAD DLESI  -doxycycline refilled, 90d supply  -tolerating without issues      RTC in 3 months, audio only per pt request    Visit today included increased complexity associated with the care of the episodic problem  addressed and managing the longitudinal care of the patient due to the serious and/or complex managed problem(s).                          This service was not originating from a related E/M service provided within the previous 7 days nor will it  lead to an E/M service or procedure within the next 24 hours or my soonest available appointment.  Prevailing standard of care was able to be met in this audio-only visit.

## 2025-06-26 ENCOUNTER — LAB VISIT (OUTPATIENT)
Dept: LAB | Facility: HOSPITAL | Age: 40
End: 2025-06-26
Attending: INTERNAL MEDICINE
Payer: MEDICAID

## 2025-06-26 DIAGNOSIS — Z95.811 LVAD (LEFT VENTRICULAR ASSIST DEVICE) PRESENT: ICD-10-CM

## 2025-06-26 LAB
INR PPP: 1.3
PROTHROMBIN TIME: 16.6 SECONDS (ref 12.5–14.5)

## 2025-06-26 PROCEDURE — 85610 PROTHROMBIN TIME: CPT

## 2025-06-26 PROCEDURE — 36415 COLL VENOUS BLD VENIPUNCTURE: CPT

## 2025-06-27 ENCOUNTER — ANTI-COAG VISIT (OUTPATIENT)
Dept: CARDIOLOGY | Facility: CLINIC | Age: 40
End: 2025-06-27
Payer: MEDICAID

## 2025-06-27 DIAGNOSIS — Z95.811 LVAD (LEFT VENTRICULAR ASSIST DEVICE) PRESENT: Primary | ICD-10-CM

## 2025-06-27 RX ORDER — WARFARIN 3 MG/1
3-4.5 TABLET ORAL DAILY
Qty: 45 TABLET | Refills: 11 | Status: SHIPPED | OUTPATIENT
Start: 2025-06-27 | End: 2026-06-27

## 2025-06-27 NOTE — PROGRESS NOTES
Spoke with patient mother  regarding recent INR results .  Questioned and verified correct dosage but taken 1.5mg 6/24/25 after running out of medicine . Denies any missed doses - Reported Shortness of breath and needs a prescription sent to   [id number] - [place], [place] - [address] AT [address]

## 2025-06-27 NOTE — PROGRESS NOTES
[facility]    06/27/2025    Kevan Queen (40 y.o.) is followed by the [facility].      Assessment/Plan:    Kevan Queen presents with a subtherapeutic  INR. Goal INR: 2.0-3.0    Lab Results   Component Value Date    INR 1.3 06/26/2025    INR 1.5 (H) 06/02/2025    INR 1.5 (H) 05/07/2025    PROTIME 16.6 (H) 06/26/2025    PROTIME 17.9 (H) 06/02/2025    PROTIME 18.4 (H) 05/07/2025       Assessment of patient findings per MA/LPN and chart review:   The following significant findings were found:   Patient reports only taking 1.5mg on 6/24 due to running out of meds but denies missing any days completely  Patient does complain of shortness of breath    Recommendation for patient's warfarin regimen:   Due to subtherapeutic INR, patient was instructed to take a booster dose today and tomorrow. Patient to resume their normal weekly dose.    Recommended repeat INR in 3 days      Nela Morgan, PharmD, BCPS  Clinical Pharmacist - [facility]  Preferred Contact: Secure Messaging or In Basket Message

## 2025-07-04 NOTE — ED NOTES
Patient presents to the ER complaining of severe pain on his chest radiating to his arm, his pain has been going on for a week. Patient is also complaining of severe headaches, complains of nausea or vomiting.    to go home

## 2025-07-08 ENCOUNTER — PATIENT MESSAGE (OUTPATIENT)
Dept: TRANSPLANT | Facility: CLINIC | Age: 40
End: 2025-07-08
Payer: MEDICAID

## 2025-07-09 RX ORDER — LEVETIRACETAM 1000 MG/1
TABLET ORAL
Qty: 270 TABLET | Refills: 3 | Status: SHIPPED | OUTPATIENT
Start: 2025-07-09

## 2025-07-16 ENCOUNTER — ANTI-COAG VISIT (OUTPATIENT)
Dept: CARDIOLOGY | Facility: CLINIC | Age: 40
End: 2025-07-16
Payer: MEDICAID

## 2025-07-16 ENCOUNTER — LAB VISIT (OUTPATIENT)
Dept: LAB | Facility: HOSPITAL | Age: 40
End: 2025-07-16
Attending: INTERNAL MEDICINE
Payer: MEDICAID

## 2025-07-16 DIAGNOSIS — Z95.811 LVAD (LEFT VENTRICULAR ASSIST DEVICE) PRESENT: ICD-10-CM

## 2025-07-16 DIAGNOSIS — Z95.811 LVAD (LEFT VENTRICULAR ASSIST DEVICE) PRESENT: Primary | ICD-10-CM

## 2025-07-16 LAB
INR PPP: 1.8
PROTHROMBIN TIME: 20.8 SECONDS (ref 12.5–14.5)

## 2025-07-16 PROCEDURE — 36415 COLL VENOUS BLD VENIPUNCTURE: CPT

## 2025-07-16 PROCEDURE — 85610 PROTHROMBIN TIME: CPT

## 2025-07-16 PROCEDURE — 93793 ANTICOAG MGMT PT WARFARIN: CPT | Mod: ,,,

## 2025-07-16 NOTE — ASSESSMENT & PLAN NOTE
-Admit sCr 1.8, baseline ~ 1.5-1.7  -Improved from 2.0 yesterday to 1.5 today since Lasix D/C'd Friday and given 250 cc IV NS yesterday  -Monitor    [facility]: Cancer Care Initial Note                                    Discussion with Patient:                                                      Met with pt, sister and dad following appt with Dr Varner.  Reviewed upcoming appts, pt to start C2 decitabine/priyanka tomorrow.  Appts for Friday and Monday still pending, pt is on the wait list.  PT prefers [facility] for labs/infusions when possible, preferences updated.  Request for lab/transfusions 2x week sent to them via scheduling.  Welcome packet and education sheet on when to call triage provided to pt.  Educated on when to call triage vs [facility]CC, what to expect in infusion, and brief review of treatment side effects.  Pt received C1 inpt at [place], reports he tolerated well.     Goals          General     Functional (pt-stated)      Notes - Note created  9/25/2024  9:51 AM by Theresa Guerrero RN     Goal Statement: I will use my clinic and care team resources as directed.  Date Goal set: 9/25/2024  Barriers: disease burden  Strengths: support, coping, motivation, health awareness, and involvement with care team  Date to Achieve By: ongoing  Patient expressed understanding of goal: Yes  Action steps to achieve this goal:  I will contact triage with new, worsening or uncontrolled symptoms.   I will contact triage with temperature over 100.4  I will call with difficulties of scheduling and/or transportation.   I will request needed refills when there are 7 days of medication remaining.   I will not send urgent or symptomatic messages through [employer].   I will contact scheduling to arrange or make changes in my appointments.                Assessment:                                                      Initial  Current living arrangement:: I live in a private home, I live alone (staying with sister currently)  Type of residence:: Private home - stairs  Informal Support system:: Family, Friends  Equipment Currently Used at Home: none  Bed or wheelchair  confined:: No  Mobility Status: Independent  Transportation means:: Regular car  Medication adherence problem (GOAL):: No  Knowledgeable about how to use meds:: Yes  Medication side effects suspected:: No  Referrals Placed: None    Plan of Care Education Review:   Assessment completed with:: Patient, Family    Plan of Care Education   Yearly learning assessment completed?: Yes (see Education tab)  Diagnosis:: AML  Does patient understand diagnosis?: Yes  Tx plan/regimen:: decitabine/priyanka  Does patient understand treatment plan/regimen?: Yes  Preparing for treatment:: Reviewed treatment preparation information with patient (vascular access, day of chemo, visitor policy, what to bring, etc.)  Vascular access education provided for:: PICC  Side effect education:: Diarrhea/Constipation, Fatigue, Infection, Nausea/Vomiting, Mylosuppression, Lab value monitoring (anemia, neutropenia, thrombocytopenia)  Safety/self care at home reviewed with patient:: Yes  Coping - concerns/fears reviewed with patient:: Yes  Plan of Care:: [facility] follow-up appointment, Lab appointment, MD follow-up appointment, Treatment schedule  When to call provider:: Bleeding, Uncontrolled diarrhea/constipation, Increased shortness of breath, Uncontrolled nausea/vomiting, New/worsening pain, Shaking chills, Temperature >100.4F  Reasons for deferring treatment reviewed with patient:: Yes  Additional education provided for: : Neutropenic precautions, Bleeding precautions  Procedure education provided for: : Blood product transfusion    Evaluation of Learning  Patient Education Provided: Yes  Readiness:: Acceptance  Method:: Literature, Explanation  Response:: Verbalizes understanding           Intervention/Education provided during outreach:                                                       Referral to [facility] placed per Dr Varner for line cares/flushes.  Pt has PICC line in [facility] from [place].      Patient to follow up as scheduled at next appt  Confirmed  patient has clinic and triage numbers    MICK SanchezN, RN  RN Care Coordinator  [facility]

## 2025-07-17 RX ORDER — POTASSIUM CHLORIDE 750 MG/1
TABLET, EXTENDED RELEASE ORAL
Qty: 90 TABLET | Refills: 3 | Status: SHIPPED | OUTPATIENT
Start: 2025-07-17

## 2025-07-17 NOTE — PROGRESS NOTES
[facility]    2025 9:01 AM    Assessment/Plan:    Patient presents today with subtherapeutic  INR.    Assessment of patient findings and chart review: INR not at goal, possibly to missed dose and incorrect dose taken. Medications, chart, and patient findings reviewed. See calendar for adjustments to dose and follow up plan.    Recommendation for patient's warfarin regimen: Continue current maintenance dose with a change in higher dose day.     Recommend repeat INR in 1 week  _________________________________________________________________    Kevan Queen (40 y.o.) is followed by the [facility].    Anticoagulation Summary  As of 2025      INR goal:  2.0-3.0   TTR:  40.1% (2.2 y)   INR used for dosin.8 (2025)   Warfarin maintenance plan:  4.5 mg (3 mg x 1.5) every Thu; 3 mg (3 mg x 1) all other days   Weekly warfarin total:  22.5 mg   Plan last modified:  Yolanda Lee, PharmD (2025)   Next INR check:  2025   Target end date:  --    Indications    LVAD (left ventricular assist device) present [Z95.811]                 Anticoagulation Episode Summary       INR check location:  --    Preferred lab:  --    Send INR reminders to:  [facility] COUMADIN LVAD    Comments:  LVAD // [facility] Landa Drawstation (may/may not received results same day) // [facility] (Ph) [phone] (Fx) [phone] //          Anticoagulation Care Providers       Provider Role Specialty Phone number    Josh Ryan MD [facility] Cardiology [phone]

## 2025-07-17 NOTE — PROGRESS NOTES
Spoke with patient mother Malou Dumont regarding recent INR results .  Patient questioned and verified incorrect dosage 3mg daily . Reported missed dose 7/9/25 -no recent changes .

## 2025-07-29 ENCOUNTER — TELEPHONE (OUTPATIENT)
Dept: TRANSPLANT | Facility: CLINIC | Age: 40
End: 2025-07-29
Payer: MEDICAID

## 2025-07-29 NOTE — TELEPHONE ENCOUNTER
Attempted to contact patient regarding report that patient isn't feeling well. Unable to leave message at this time.

## 2025-07-30 ENCOUNTER — LAB VISIT (OUTPATIENT)
Dept: LAB | Facility: HOSPITAL | Age: 40
End: 2025-07-30
Attending: INTERNAL MEDICINE
Payer: MEDICAID

## 2025-07-30 ENCOUNTER — ANTI-COAG VISIT (OUTPATIENT)
Dept: CARDIOLOGY | Facility: CLINIC | Age: 40
End: 2025-07-30
Payer: MEDICAID

## 2025-07-30 DIAGNOSIS — Z95.811 LVAD (LEFT VENTRICULAR ASSIST DEVICE) PRESENT: Primary | ICD-10-CM

## 2025-07-30 DIAGNOSIS — Z95.811 LVAD (LEFT VENTRICULAR ASSIST DEVICE) PRESENT: ICD-10-CM

## 2025-07-30 LAB
INR PPP: 1.7
PROTHROMBIN TIME: 19.7 SECONDS (ref 12.5–14.5)

## 2025-07-30 PROCEDURE — 36415 COLL VENOUS BLD VENIPUNCTURE: CPT

## 2025-07-30 PROCEDURE — 93793 ANTICOAG MGMT PT WARFARIN: CPT | Mod: ,,,

## 2025-07-30 PROCEDURE — 85610 PROTHROMBIN TIME: CPT

## 2025-07-30 NOTE — PROGRESS NOTES
Mother confirmed pts taking correct dose (4.5mg)-LULANELIDA  Reports eating a lot of sun flower seeds; OTC Advil 4 doses this week  NO other changes reported

## 2025-07-31 NOTE — PROGRESS NOTES
[facility]    2025 8:25 AM    Assessment/Plan:    Patient presents today with subtherapeutic  INR.    Assessment of patient findings and chart review: INR not at goal. Medications, chart, and patient findings reviewed. See calendar for adjustments to dose and follow up plan.    Recommendation for patient's warfarin regimen: Increase maintenance dose    Recommend repeat INR in 5 days  _________________________________________________________________    Kevanmohit Queen (40 y.o.) is followed by the [facility].    Anticoagulation Summary  As of 2025      INR goal:  2.0-3.0   TTR:  39.5% (2.2 y)   INR used for dosin.7 (2025)   Warfarin maintenance plan:  4.5 mg (3 mg x 1.5) every Thu; 3 mg (3 mg x 1) all other days   Weekly warfarin total:  22.5 mg   Plan last modified:  Yolanda Lee, PharmD (2025)   Next INR check:  --   Target end date:  --    Indications    LVAD (left ventricular assist device) present [Z95.811]                 Anticoagulation Episode Summary       INR check location:  --    Preferred lab:  --    Send INR reminders to:  [facility] COUMADIN LVAD    Comments:  LVAD // ANGELICA Landa Drawstation (may/may not received results same day) // [facility] (Ph) [phone] (Fx) [phone] //          Anticoagulation Care Providers       Provider Role Specialty Phone number    Josh Ryan MD [facility] Cardiology [phone]

## 2025-08-11 ENCOUNTER — LAB VISIT (OUTPATIENT)
Dept: LAB | Facility: HOSPITAL | Age: 40
End: 2025-08-11
Attending: INTERNAL MEDICINE
Payer: MEDICAID

## 2025-08-11 ENCOUNTER — ANTI-COAG VISIT (OUTPATIENT)
Dept: CARDIOLOGY | Facility: CLINIC | Age: 40
End: 2025-08-11
Payer: MEDICAID

## 2025-08-11 DIAGNOSIS — Z95.811 LVAD (LEFT VENTRICULAR ASSIST DEVICE) PRESENT: ICD-10-CM

## 2025-08-11 DIAGNOSIS — Z95.811 LVAD (LEFT VENTRICULAR ASSIST DEVICE) PRESENT: Primary | ICD-10-CM

## 2025-08-11 LAB
INR PPP: 2.4
PROTHROMBIN TIME: 25.9 SECONDS (ref 12.5–14.5)

## 2025-08-11 PROCEDURE — 93793 ANTICOAG MGMT PT WARFARIN: CPT | Mod: ,,,

## 2025-08-11 PROCEDURE — 85610 PROTHROMBIN TIME: CPT

## 2025-08-11 PROCEDURE — 36415 COLL VENOUS BLD VENIPUNCTURE: CPT

## 2025-08-25 ENCOUNTER — TELEPHONE (OUTPATIENT)
Dept: TRANSPLANT | Facility: CLINIC | Age: 40
End: 2025-08-25
Payer: MEDICAID

## 2025-08-27 ENCOUNTER — ANTI-COAG VISIT (OUTPATIENT)
Dept: CARDIOLOGY | Facility: CLINIC | Age: 40
End: 2025-08-27
Payer: MEDICAID

## 2025-08-27 ENCOUNTER — LAB VISIT (OUTPATIENT)
Dept: LAB | Facility: HOSPITAL | Age: 40
End: 2025-08-27
Attending: INTERNAL MEDICINE
Payer: MEDICAID

## 2025-08-27 DIAGNOSIS — Z95.811 LVAD (LEFT VENTRICULAR ASSIST DEVICE) PRESENT: Primary | ICD-10-CM

## 2025-08-27 DIAGNOSIS — Z95.811 LVAD (LEFT VENTRICULAR ASSIST DEVICE) PRESENT: ICD-10-CM

## 2025-08-27 LAB
INR PPP: 1.9
PROTHROMBIN TIME: 21.8 SECONDS (ref 12.5–14.5)

## 2025-08-27 PROCEDURE — 85610 PROTHROMBIN TIME: CPT

## 2025-08-27 PROCEDURE — 93793 ANTICOAG MGMT PT WARFARIN: CPT | Mod: ,,,

## 2025-08-27 PROCEDURE — 36415 COLL VENOUS BLD VENIPUNCTURE: CPT

## 2025-08-28 ENCOUNTER — ANTI-COAG VISIT (OUTPATIENT)
Dept: CARDIOLOGY | Facility: CLINIC | Age: 40
End: 2025-08-28
Payer: MEDICAID

## 2025-08-28 ENCOUNTER — DOCUMENTATION ONLY (OUTPATIENT)
Dept: CARDIOTHORACIC SURGERY | Facility: CLINIC | Age: 40
End: 2025-08-28
Payer: MEDICAID

## 2025-08-28 ENCOUNTER — HOSPITAL ENCOUNTER (OUTPATIENT)
Dept: PULMONOLOGY | Facility: CLINIC | Age: 40
Discharge: HOME | End: 2025-08-28
Payer: MEDICAID

## 2025-08-28 ENCOUNTER — CLINICAL SUPPORT (OUTPATIENT)
Dept: TRANSPLANT | Facility: CLINIC | Age: 40
End: 2025-08-28
Payer: MEDICAID

## 2025-08-28 ENCOUNTER — OFFICE VISIT (OUTPATIENT)
Dept: TRANSPLANT | Facility: CLINIC | Age: 40
End: 2025-08-28
Payer: MEDICAID

## 2025-08-28 VITALS
HEIGHT: 75 IN | TEMPERATURE: 97 F | WEIGHT: 148 LBS | WEIGHT: 175 LBS | SYSTOLIC BLOOD PRESSURE: 74 MMHG | BODY MASS INDEX: 21.76 KG/M2 | BODY MASS INDEX: 18.4 KG/M2 | HEART RATE: 54 BPM | HEIGHT: 75 IN

## 2025-08-28 DIAGNOSIS — F19.11 HISTORY OF SUBSTANCE ABUSE: ICD-10-CM

## 2025-08-28 DIAGNOSIS — I50.42 CHRONIC COMBINED SYSTOLIC AND DIASTOLIC HEART FAILURE: ICD-10-CM

## 2025-08-28 DIAGNOSIS — F41.9 ANXIETY: ICD-10-CM

## 2025-08-28 DIAGNOSIS — Z95.811 LVAD (LEFT VENTRICULAR ASSIST DEVICE) PRESENT: Primary | ICD-10-CM

## 2025-08-28 DIAGNOSIS — T82.7XXA INFECTION ASSOCIATED WITH DRIVELINE OF LEFT VENTRICULAR ASSIST DEVICE (LVAD): ICD-10-CM

## 2025-08-28 DIAGNOSIS — Z95.811 LVAD (LEFT VENTRICULAR ASSIST DEVICE) PRESENT: ICD-10-CM

## 2025-08-28 DIAGNOSIS — Z79.01 CHRONIC ANTICOAGULATION: ICD-10-CM

## 2025-08-28 DIAGNOSIS — I50.9 ACUTE DECOMPENSATED HEART FAILURE: ICD-10-CM

## 2025-08-28 DIAGNOSIS — I50.814 RIGHT HEART FAILURE SECONDARY TO LEFT HEART FAILURE: ICD-10-CM

## 2025-08-28 DIAGNOSIS — R52 PAIN: ICD-10-CM

## 2025-08-28 DIAGNOSIS — I50.84 CHF (NYHA CLASS IV, ACC/AHA STAGE D): ICD-10-CM

## 2025-08-28 PROCEDURE — 94618 PULMONARY STRESS TESTING: CPT | Mod: PBBFAC | Performed by: INTERNAL MEDICINE

## 2025-08-28 PROCEDURE — 99999PBSHW PR PBB SHADOW TECHNICAL ONLY FILED TO HB: Mod: PBBFAC,,,

## 2025-08-28 PROCEDURE — 99999 PR PBB SHADOW E&M-EST. PATIENT-LVL I: CPT | Mod: PBBFAC,,,

## 2025-08-28 PROCEDURE — 99214 OFFICE O/P EST MOD 30 MIN: CPT | Mod: PBBFAC,27 | Performed by: INTERNAL MEDICINE

## 2025-08-28 PROCEDURE — 99211 OFF/OP EST MAY X REQ PHY/QHP: CPT | Mod: PBBFAC

## 2025-08-28 PROCEDURE — 99999 PR PBB SHADOW E&M-EST. PATIENT-LVL IV: CPT | Mod: PBBFAC,,, | Performed by: INTERNAL MEDICINE

## 2025-08-28 RX ORDER — FUROSEMIDE 80 MG/1
80 TABLET ORAL 2 TIMES DAILY
Qty: 180 TABLET | Refills: 3 | Status: SHIPPED | OUTPATIENT
Start: 2025-08-28

## 2025-08-28 RX ORDER — AMLODIPINE BESYLATE 10 MG/1
10 TABLET ORAL DAILY
Qty: 90 TABLET | Refills: 3 | Status: SHIPPED | OUTPATIENT
Start: 2025-08-28

## 2025-08-28 RX ORDER — WARFARIN 3 MG/1
3-4.5 TABLET ORAL DAILY
Qty: 60 TABLET | Refills: 5 | Status: SHIPPED | OUTPATIENT
Start: 2025-08-28 | End: 2026-08-28

## 2025-08-28 RX ORDER — EPLERENONE 25 MG/1
50 TABLET ORAL DAILY
Qty: 180 TABLET | Refills: 3 | Status: SHIPPED | OUTPATIENT
Start: 2025-08-28

## 2025-08-28 RX ORDER — PANTOPRAZOLE SODIUM 40 MG/1
40 TABLET, DELAYED RELEASE ORAL DAILY
Qty: 90 TABLET | Refills: 3 | Status: SHIPPED | OUTPATIENT
Start: 2025-08-28

## 2025-08-28 RX ORDER — DULOXETIN HYDROCHLORIDE 30 MG/1
30 CAPSULE, DELAYED RELEASE ORAL DAILY
Qty: 90 CAPSULE | Refills: 3 | Status: SHIPPED | OUTPATIENT
Start: 2025-08-28

## 2025-08-28 RX ORDER — GABAPENTIN 100 MG/1
CAPSULE ORAL
Qty: 450 CAPSULE | Refills: 3 | Status: SHIPPED | OUTPATIENT
Start: 2025-08-28 | End: 2026-08-28

## 2025-08-28 RX ORDER — ATORVASTATIN CALCIUM 40 MG/1
40 TABLET, FILM COATED ORAL DAILY
Qty: 90 TABLET | Refills: 3 | Status: SHIPPED | OUTPATIENT
Start: 2025-08-28

## 2025-08-28 RX ORDER — AMIODARONE HYDROCHLORIDE 200 MG/1
200 TABLET ORAL DAILY
Qty: 90 TABLET | Refills: 3 | Status: SHIPPED | OUTPATIENT
Start: 2025-08-28

## 2025-08-28 RX ORDER — ASPIRIN 81 MG/1
81 TABLET ORAL DAILY
Qty: 90 TABLET | Refills: 3 | Status: SHIPPED | OUTPATIENT
Start: 2025-08-28

## 2025-08-28 RX ORDER — LANOLIN ALCOHOL/MO/W.PET/CERES
400 CREAM (GRAM) TOPICAL DAILY
Qty: 90 TABLET | Refills: 3 | Status: SHIPPED | OUTPATIENT
Start: 2025-08-28

## (undated) DEVICE — KIT MICROINTRODUCE MINI 5X10CM

## (undated) DEVICE — CATH SWAN GANZ STND 7FR

## (undated) DEVICE — CONNECTOR 1/4X3/16X3/16 Y

## (undated) DEVICE — KIT PROBE COVER WITH GEL

## (undated) DEVICE — TOWEL OR DISP STRL BLUE 4/PK

## (undated) DEVICE — STAPLER SKIN PROXIMATE WIDE

## (undated) DEVICE — SWABSTICK BENZOIN 4 IN

## (undated) DEVICE — NDL BOX COUNTER

## (undated) DEVICE — SUT PROLENE 5-0 36IN C-1

## (undated) DEVICE — SEE MEDLINE ITEM 156894

## (undated) DEVICE — PACK DRAPE UNIVERSAL CONVERTOR

## (undated) DEVICE — VALVE ULTRASITE MALE LUER

## (undated) DEVICE — DRAPE INCISE IOBAN 2 23X17IN

## (undated) DEVICE — DEVICE PLASMABLADE X 3.0S LT

## (undated) DEVICE — PAD DEFIB CADENCE ADULT R2

## (undated) DEVICE — CATH ANG PIGTAIL 4FR INFINITY

## (undated) DEVICE — SET MICROPUNCTURE 5FR 501NT

## (undated) DEVICE — TIP YANKAUERS BULB NO VENT

## (undated) DEVICE — ELECTRODE EXTENDED BLADE

## (undated) DEVICE — SOL NS 1000CC

## (undated) DEVICE — DRAPE IOBAN 2 STERI

## (undated) DEVICE — PACK SET UP CONVERTORS

## (undated) DEVICE — HOLDER TUBE

## (undated) DEVICE — BLADE 4 INCH EDGE UN-INS

## (undated) DEVICE — GLOVE SURG BIOGEL LATEX SZ 7.5

## (undated) DEVICE — DRAPE SLUSH WARMER WITH DISC

## (undated) DEVICE — BOOT SUTURE AID

## (undated) DEVICE — SHEATH HEMOSTASIS 8.5FR

## (undated) DEVICE — DRAPE SPLIT STERILE

## (undated) DEVICE — SUT VICRYL BR 1 GEN 27 CT-1

## (undated) DEVICE — SEE MEDLINE ITEM 154981

## (undated) DEVICE — CLOSURE SKIN STERI STRIP 1/2X4

## (undated) DEVICE — DRESSING TRANS 8X12 TEGADERM

## (undated) DEVICE — TRAY MINOR GEN SURG

## (undated) DEVICE — DRESSING TRANS 4X4 TEGADERM

## (undated) DEVICE — SUT BONE WAX 2.5 GRMS 12/BX

## (undated) DEVICE — NDL PERCUTANEOUS ENTRYBSDN 18

## (undated) DEVICE — SUT 3-0 CTD VICRYL 27IN PS

## (undated) DEVICE — SOL 9P NACL IRR PIC IL

## (undated) DEVICE — TRAY SKIN SCRUB WET 4 COMPART

## (undated) DEVICE — WIRE LUNDERQUIST 260CM

## (undated) DEVICE — GAUZE SPONGE 4X4 12PLY

## (undated) DEVICE — SUT SILK 2-0 SH 18IN BLACK

## (undated) DEVICE — KIT SAHARA DRAPE DRAW/LIFT

## (undated) DEVICE — APPLICATOR CHLORAPREP ORN 26ML

## (undated) DEVICE — SHEATH INTRODUCER 7FR 11CM

## (undated) DEVICE — DEVICE PERCLOSE SUT CLSR 6FR

## (undated) DEVICE — RETRACTOR OCTOBASE INSERT HOLD

## (undated) DEVICE — SYR IRRIGATION BULB STER 60ML

## (undated) DEVICE — DRESSING INFOVAC MED RND BLK

## (undated) DEVICE — ELECTRODE REM PLYHSV RETURN 9

## (undated) DEVICE — CATH THORACIC 32FR ST

## (undated) DEVICE — TRAY CATH LAB OMC

## (undated) DEVICE — PACK PACER PERMANENT OMC

## (undated) DEVICE — DRAIN CHEST DRY SUCTION

## (undated) DEVICE — DRESSING AQUACEL SACRAL 9 X 9

## (undated) DEVICE — KIT PREVENA PLUS

## (undated) DEVICE — REMOVER STAPLE SKIN STERILE

## (undated) DEVICE — TUBING SUC UNIV W/CONN 12FT

## (undated) DEVICE — CONTAINER SPECIMEN STRL 4OZ

## (undated) DEVICE — PAD K-THERMIA 24IN X 60IN

## (undated) DEVICE — SUT 6 18IN STEEL MONO CCS

## (undated) DEVICE — DRESSING ABSRBNT ISLAND 3.6X8

## (undated) DEVICE — GUIDEWIRE EMERALD 150CM PTFE

## (undated) DEVICE — GLOVE BIOGEL PI MICRO SZ 7.5

## (undated) DEVICE — CATH THOR STND RGHT ANG 32FR

## (undated) DEVICE — SUT PROLENE 4-0 SH BLU 36IN

## (undated) DEVICE — SUT ETHIBOND XTRA 1 CTX

## (undated) DEVICE — SUT PROLENE 4-0 RB-1 BL MO

## (undated) DEVICE — DRESSING TRANS 6X8 TEGADERM

## (undated) DEVICE — BLADE SAW STERNAL 5/BX

## (undated) DEVICE — CANISTER INFOV.A.C WOUND 500ML

## (undated) DEVICE — ADHESIVE DERMABOND ADVANCED

## (undated) DEVICE — Device

## (undated) DEVICE — BOOT AIR FLUID HEEL ADLT STD

## (undated) DEVICE — BENZOIN TINCTURE 0.66ML

## (undated) DEVICE — PLEDGET SUT SOFT 3/8X3/16X1/16

## (undated) DEVICE — SEE MEDLINE ITEM 157117

## (undated) DEVICE — BANDAGE ESMARK ELASTIC ST 4X9

## (undated) DEVICE — TAPE SURG MEDIPORE 6X72IN

## (undated) DEVICE — TRAY HEART

## (undated) DEVICE — SUT PROLENE 5-0 BL C-1 4-24

## (undated) DEVICE — LOOP VESSEL BLUE MAXI

## (undated) DEVICE — POUCH SELFSEAL GAS STM 5.25X10

## (undated) DEVICE — HEMOSTAT SURGICEL NU-KNIT 6X9

## (undated) DEVICE — DRAPE STERI INSTRUMENT 1018

## (undated) DEVICE — SCALPEL #11 BLADE STRL DISP

## (undated) DEVICE — SUT 2/0 36IN ETHIBOND EXCE

## (undated) DEVICE — SUT SILK BLK BR. 2 2-60

## (undated) DEVICE — LINE PRESSURE MONITORING 96IN

## (undated) DEVICE — SUT 2/0 36IN COATED VICRYL

## (undated) DEVICE — SPONGE LAP 18X18 PREWASHED

## (undated) DEVICE — PAD RADIOLUCENT STAT ADULT

## (undated) DEVICE — KIT URINARY CATH URINE METER

## (undated) DEVICE — SET DECANTER MEDICHOICE

## (undated) DEVICE — COVER PROBE US 5.5X58L NON LTX